# Patient Record
Sex: MALE | Race: BLACK OR AFRICAN AMERICAN | NOT HISPANIC OR LATINO | Employment: FULL TIME | URBAN - METROPOLITAN AREA
[De-identification: names, ages, dates, MRNs, and addresses within clinical notes are randomized per-mention and may not be internally consistent; named-entity substitution may affect disease eponyms.]

---

## 2017-01-05 ENCOUNTER — NURSE TRIAGE (OUTPATIENT)
Dept: ADMINISTRATIVE | Facility: CLINIC | Age: 52
End: 2017-01-05

## 2017-01-06 ENCOUNTER — TELEPHONE (OUTPATIENT)
Dept: FAMILY MEDICINE | Facility: CLINIC | Age: 52
End: 2017-01-06

## 2017-01-06 NOTE — TELEPHONE ENCOUNTER
"  Reason for Disposition   [1] Systolic BP  AND [2] taking blood pressure medications AND [3] NOT dizzy, lightheaded or weak    Answer Assessment - Initial Assessment Questions  1. BLOOD PRESSURE: "What is the blood pressure?" "Did you take at least two measurements 5 minutes apart?"      99/56 HR 64- wife reported his b/p has been low for past 3 days when he gets home and he has been feeling tired.   2. ONSET: "When did you take your blood pressure?"      About 10 min ago  3. HOW: "How did you obtain the blood pressure?" (e.g., visiting nurse, automatic home BP monitor)      Home monitor  4. HISTORY: "Do you have a history of low blood pressure?" "What is your blood pressure normally?"      Is treated for htn,   5. MEDICATIONS: "Are you taking any medications for blood pressure?" If yes: "Have they been changed recently?"      Is taking meds with no reported changes  6. PULSE RATE: "Do you know what your pulse rate is?"       As above  7. OTHER SYMPTOMS: "Have you been sick recently?" "Have you had a recent injury?"      denied  8. PREGNANCY: "Is there any chance you are pregnant?" "When was your last menstrual period?"      N/a  Pt has had 1 cup coffee today and 1 bottle of water, has just voided when he got home. Stated urine was dark.    Protocols used: ST LOW BLOOD PRESSURE-A-    Advised wife to have him elevate legs, drink at least 2 glasses of water in next her and recheck, if systolic still less than 100 and he is feeling weak/dizzy ER - f/u with  tomorrow. Advised on need to increase fluid intake.  "

## 2017-01-06 NOTE — TELEPHONE ENCOUNTER
Pt is doing very well after rehydration.     Pt did state that he has been dizzy int for the last 2 months. His bp has been dropping down to 90s/65 during these episodes. Advised him to hold lisinopril for now and monitor his BP.

## 2017-01-29 ENCOUNTER — PATIENT MESSAGE (OUTPATIENT)
Dept: FAMILY MEDICINE | Facility: CLINIC | Age: 52
End: 2017-01-29

## 2017-02-14 ENCOUNTER — OFFICE VISIT (OUTPATIENT)
Dept: CARDIOLOGY | Facility: CLINIC | Age: 52
End: 2017-02-14
Payer: MEDICAID

## 2017-02-14 VITALS
WEIGHT: 264 LBS | HEART RATE: 77 BPM | BODY MASS INDEX: 34.99 KG/M2 | HEIGHT: 73 IN | DIASTOLIC BLOOD PRESSURE: 82 MMHG | SYSTOLIC BLOOD PRESSURE: 132 MMHG | OXYGEN SATURATION: 98 %

## 2017-02-14 DIAGNOSIS — I50.41 ACUTE COMBINED SYSTOLIC AND DIASTOLIC HEART FAILURE: Primary | ICD-10-CM

## 2017-02-14 DIAGNOSIS — Z95.810 BIVENTRICULAR IMPLANTABLE CARDIOVERTER-DEFIBRILLATOR IN SITU: ICD-10-CM

## 2017-02-14 DIAGNOSIS — I10 ESSENTIAL HYPERTENSION: ICD-10-CM

## 2017-02-14 PROCEDURE — 93289 INTERROG DEVICE EVAL HEART: CPT | Mod: PBBFAC | Performed by: INTERNAL MEDICINE

## 2017-02-14 PROCEDURE — 99212 OFFICE O/P EST SF 10 MIN: CPT | Mod: S$PBB,25,, | Performed by: INTERNAL MEDICINE

## 2017-02-14 PROCEDURE — 99213 OFFICE O/P EST LOW 20 MIN: CPT | Mod: PBBFAC | Performed by: INTERNAL MEDICINE

## 2017-02-14 PROCEDURE — 99999 PR PBB SHADOW E&M-EST. PATIENT-LVL III: CPT | Mod: PBBFAC,,, | Performed by: INTERNAL MEDICINE

## 2017-02-14 PROCEDURE — 93289 INTERROG DEVICE EVAL HEART: CPT | Mod: 26,S$PBB,, | Performed by: INTERNAL MEDICINE

## 2017-02-14 RX ORDER — LISINOPRIL 5 MG/1
5 TABLET ORAL DAILY
Qty: 30 TABLET | Refills: 2 | Status: CANCELLED | OUTPATIENT
Start: 2017-02-14

## 2017-02-14 NOTE — MR AVS SNAPSHOT
[facility]  [address] [facility] [place] [address]  Phone: [phone]                  Tim Richards   2017 1:40 PM   Office Visit    Description:  Male : 1965   Provider:  Nader Chiu MD   Department:  [facility]           Reason for Visit     Follow-up     Pacemaker Check                To Do List           Future Appointments        Provider Department Dept Phone    2017 11:00 AM Ja Méndez MD [facility] [phone]      Goals (5 Years of Data)     None      [facility] On Call     [facility] On Call Nurse Care Line -  Assistance  Registered nurses in the [facility] On Call Center provide clinical advisement, health education, appointment booking, and other advisory services.  Call for this free service at [phone].             Medications           Message regarding Medications     Verify the changes and/or additions to your medication regime listed below are the same as discussed with your clinician today.  If any of these changes or additions are incorrect, please notify your healthcare provider.        STOP taking these medications     nicotine (NICODERM CQ) 21 mg/24 hr Place 1 patch onto the skin once daily. NAME BRAND ONLY           Verify that the below list of medications is an accurate representation of the medications you are currently taking.  If none reported, the list may be blank. If incorrect, please contact your healthcare provider. Carry this list with you in case of emergency.           Current Medications     allopurinol (ZYLOPRIM) 100 MG tablet Take 1 tablet (100 mg total) by mouth 3 (three) times daily.    amiodarone (PACERONE) 200 MG Tab TAKE 1 TABLET (200 MG TOTAL) BY MOUTH ONCE DAILY.    aspirin (ECOTRIN) 81 MG EC tablet Take 81 mg by mouth. 1 Tablet, Delayed Release (E.C.) Oral Every day    carvedilol (COREG) 6.25 MG tablet TAKE 1 TABLET (6.25 MG TOTAL) BY MOUTH 2 (TWO) TIMES DAILY.     "ERGOCALCIFEROL, VITAMIN D2, (VITAMIN D ORAL) Take by mouth once daily.    furosemide (LASIX) 40 MG tablet TAKE 1 TABLET BY MOUTH TWICE A DAY AS NEEDED INCREASE TO 2 TABLETS TWICE A DAY FOR ACUTE WEIGHT GAIN    hydrALAZINE (APRESOLINE) 25 MG tablet Take 1 tablet (25 mg total) by mouth 3 (three) times daily.    lisinopril (PRINIVIL,ZESTRIL) 5 MG tablet Take 1 tablet (5 mg total) by mouth once daily.    metOLazone (ZAROXOLYN) 2.5 MG tablet TAKE 1 TABLET BY MOUTH EVERY OTHER DAY    multivitamin (ONE DAILY MULTIVITAMIN) per tablet Take 1 tablet by mouth once daily.    potassium chloride SA (K-DUR,KLOR-CON) 20 MEQ tablet Take 1 tablet (20 mEq total) by mouth once daily.    VITAMIN E ACETATE (VITAMIN E ORAL) Take by mouth once daily.           Clinical Reference Information           Your Vitals Were     BP Pulse Height Weight SpO2 BMI    132/82 (BP Location: Left arm, Patient Position: Sitting, BP Method: Automatic) 77 6' 1" (1.854 m) 119.7 kg (264 lb) 98% 34.83 kg/m2      Blood Pressure          Most Recent Value    BP  132/82      Allergies as of 2/14/2017     No Known Allergies      Immunizations Administered on Date of Encounter - 2/14/2017     None      Smoking Cessation     If you would like to quit smoking:   You may be eligible for free services if you are a [place] resident and started smoking cigarettes before September 1, 1988.  Call the Smoking Cessation Trust ([facility]) toll free at ([phone] 027-8835 or ([phone] [phone].   Call 1-800-QUIT-NOW if you do not meet the above criteria.            Language Assistance Services     ATTENTION: Language assistance services are available, free of charge. Please call [phone].      ATENCIÓN: Si habla español, tiene a flowers disposición servicios gratuitos de asistencia lingüística. Llame al [phone].     CHÚ Ý: N?u b?n nói Ti?ng Vi?t, có các d?ch v? h? tr? ngôn ng? mi?n phí dành cho b?n. G?i s? [phone].         [facility] complies with applicable " Federal civil rights laws and does not discriminate on the basis of race, color, national origin, age, disability, or sex.

## 2017-02-16 ENCOUNTER — OFFICE VISIT (OUTPATIENT)
Dept: FAMILY MEDICINE | Facility: CLINIC | Age: 52
End: 2017-02-16
Payer: MEDICAID

## 2017-02-16 ENCOUNTER — LAB VISIT (OUTPATIENT)
Dept: LAB | Facility: HOSPITAL | Age: 52
End: 2017-02-16
Attending: FAMILY MEDICINE
Payer: MEDICAID

## 2017-02-16 VITALS
WEIGHT: 268.94 LBS | DIASTOLIC BLOOD PRESSURE: 70 MMHG | SYSTOLIC BLOOD PRESSURE: 104 MMHG | HEIGHT: 73 IN | BODY MASS INDEX: 35.64 KG/M2 | TEMPERATURE: 99 F | RESPIRATION RATE: 16 BRPM | HEART RATE: 89 BPM | OXYGEN SATURATION: 98 %

## 2017-02-16 DIAGNOSIS — I10 ESSENTIAL HYPERTENSION: ICD-10-CM

## 2017-02-16 DIAGNOSIS — E87.6 HYPOKALEMIA: ICD-10-CM

## 2017-02-16 DIAGNOSIS — R42 EPISODIC LIGHTHEADEDNESS: ICD-10-CM

## 2017-02-16 DIAGNOSIS — E87.6 HYPOKALEMIA: Primary | ICD-10-CM

## 2017-02-16 LAB — POTASSIUM SERPL-SCNC: 2.9 MMOL/L

## 2017-02-16 PROCEDURE — 99999 PR PBB SHADOW E&M-EST. PATIENT-LVL III: CPT | Mod: PBBFAC,,, | Performed by: FAMILY MEDICINE

## 2017-02-16 PROCEDURE — 36415 COLL VENOUS BLD VENIPUNCTURE: CPT | Mod: PO

## 2017-02-16 PROCEDURE — 99214 OFFICE O/P EST MOD 30 MIN: CPT | Mod: S$PBB,,, | Performed by: FAMILY MEDICINE

## 2017-02-16 PROCEDURE — 84132 ASSAY OF SERUM POTASSIUM: CPT

## 2017-02-16 RX ORDER — LISINOPRIL 5 MG/1
5 TABLET ORAL DAILY
Qty: 30 TABLET | Refills: 5 | Status: CANCELLED | OUTPATIENT
Start: 2017-02-16

## 2017-02-16 RX ORDER — LISINOPRIL 5 MG/1
5 TABLET ORAL DAILY
Qty: 30 TABLET | Refills: 2 | Status: CANCELLED | OUTPATIENT
Start: 2017-02-16

## 2017-02-16 RX ORDER — CARVEDILOL 3.12 MG/1
3.12 TABLET ORAL 2 TIMES DAILY WITH MEALS
Qty: 60 TABLET | Refills: 11 | Status: ON HOLD | OUTPATIENT
Start: 2017-02-16 | End: 2018-01-17 | Stop reason: HOSPADM

## 2017-02-16 RX ORDER — LISINOPRIL 5 MG/1
5 TABLET ORAL DAILY
Qty: 30 TABLET | Refills: 2 | Status: SHIPPED | OUTPATIENT
Start: 2017-02-16 | End: 2017-03-10

## 2017-02-16 NOTE — MR AVS SNAPSHOT
[facility]  [address] [place] [address]  Phone: [phone]  Fax: [phone]                  Tim Richards   2017 11:00 AM   Office Visit    Description:  Male : 1965   Provider:  Ja Méndez MD   Department:  [facility]           Reason for Visit     Hypokalemia           Diagnoses this Visit        Comments    Hypokalemia    -  Primary     Essential hypertension                To Do List           Goals (5 Years of Data)     None      Follow-Up and Disposition     Return in about 4 months (around 2017).       These Medications        Disp Refills Start End    carvedilol (COREG) 3.125 MG tablet 60 tablet 11 2017    Take 1 tablet (3.125 mg total) by mouth 2 (two) times daily with meals. - Oral    Pharmacy: [facility]/pharmacy #[id number] [facility] [place] [address] Ph #: [phone]       lisinopril (PRINIVIL,ZESTRIL) 5 MG tablet 30 tablet 2 2017     Take 1 tablet (5 mg total) by mouth once daily. - Oral    Pharmacy: [facility]/pharmacy #[id number] - [place], [place] - [address] Ph #: [phone]         [facility] On Call     [facility] On Call Nurse Care Line -  Assistance  Registered nurses in the [facility] On Call Center provide clinical advisement, health education, appointment booking, and other advisory services.  Call for this free service at [phone].             Medications           Message regarding Medications     Verify the changes and/or additions to your medication regime listed below are the same as discussed with your clinician today.  If any of these changes or additions are incorrect, please notify your healthcare provider.        START taking these NEW medications        Refills    carvedilol (COREG) 3.125 MG tablet 11    Sig: Take 1 tablet (3.125 mg total) by mouth 2 (two) times daily with meals.    Class: Normal    Route: Oral           Verify that the below list of medications is an  "accurate representation of the medications you are currently taking.  If none reported, the list may be blank. If incorrect, please contact your healthcare provider. Carry this list with you in case of emergency.           Current Medications     allopurinol (ZYLOPRIM) 100 MG tablet Take 1 tablet (100 mg total) by mouth 3 (three) times daily.    amiodarone (PACERONE) 200 MG Tab TAKE 1 TABLET (200 MG TOTAL) BY MOUTH ONCE DAILY.    aspirin (ECOTRIN) 81 MG EC tablet Take 81 mg by mouth. 1 Tablet, Delayed Release (E.C.) Oral Every day    ERGOCALCIFEROL, VITAMIN D2, (VITAMIN D ORAL) Take by mouth once daily.    furosemide (LASIX) 40 MG tablet TAKE 1 TABLET BY MOUTH TWICE A DAY AS NEEDED INCREASE TO 2 TABLETS TWICE A DAY FOR ACUTE WEIGHT GAIN    hydrALAZINE (APRESOLINE) 25 MG tablet Take 1 tablet (25 mg total) by mouth 3 (three) times daily.    lisinopril (PRINIVIL,ZESTRIL) 5 MG tablet Take 1 tablet (5 mg total) by mouth once daily.    metOLazone (ZAROXOLYN) 2.5 MG tablet TAKE 1 TABLET BY MOUTH EVERY OTHER DAY    multivitamin (ONE DAILY MULTIVITAMIN) per tablet Take 1 tablet by mouth once daily.    VITAMIN E ACETATE (VITAMIN E ORAL) Take by mouth once daily.    carvedilol (COREG) 3.125 MG tablet Take 1 tablet (3.125 mg total) by mouth 2 (two) times daily with meals.    potassium chloride SA (K-DUR,KLOR-CON) 20 MEQ tablet Take 1 tablet (20 mEq total) by mouth once daily.           Clinical Reference Information           Your Vitals Were     BP Pulse Temp Resp Height Weight    104/70 (BP Location: Left arm, Patient Position: Sitting, BP Method: Manual) 89 98.8 °F (37.1 °C) (Oral) 16 6' 1" (1.854 m) 122 kg (268 lb 15.4 oz)    SpO2 BMI             98% 35.49 kg/m2         Blood Pressure          Most Recent Value    BP  104/70      Allergies as of 2/16/2017     No Known Allergies      Immunizations Administered on Date of Encounter - 2/16/2017     None      Orders Placed During Today's Visit     Future Labs/Procedures " Expected by Expires    Potassium  2/16/2017 4/17/2018      Smoking Cessation     If you would like to quit smoking:   You may be eligible for free services if you are a [place] resident and started smoking cigarettes before September 1, 1988.  Call the Smoking Cessation Trust ([facility]) toll free at (930) 411-6604 or (952) 787-8776.   Call 8-304-QUIT-NOW if you do not meet the above criteria.            Language Assistance Services     ATTENTION: Language assistance services are available, free of charge. Please call [phone].      ATENCIÓN: Si habla español, tiene a flowers disposición servicios gratuitos de asistencia lingüística. Llame al [phone].     CHÚ Ý: N?u b?n nói Ti?ng Vi?t, có các d?ch v? h? tr? ngôn ng? mi?n phí dành cho b?n. G?i s? [phone].         [facility] complies with applicable Federal civil rights laws and does not discriminate on the basis of race, color, national origin, age, disability, or sex.

## 2017-02-16 NOTE — PROGRESS NOTES
Subjective:       Patient ID: Tim Richards is a 51 y.o. male.    Chief Complaint: Hypokalemia (follow up repeat lab)    HPI    Hypokalemia - Pt is here today for f/u hypokalemia. He has had issues with this in the past due to his lasix. No complaints of CP or palpitations.       Hypotension - pt did have some episodes of lower blood pressure a/w dizziness since he was started on lisinopril. He noted that this occurred more often when he takes the lisinopril and carvedilol together.     Overall, pt feels better and stronger than he has in a while.     Current Outpatient Prescriptions on File Prior to Visit   Medication Sig Dispense Refill    allopurinol (ZYLOPRIM) 100 MG tablet Take 1 tablet (100 mg total) by mouth 3 (three) times daily. 90 tablet 1    amiodarone (PACERONE) 200 MG Tab TAKE 1 TABLET (200 MG TOTAL) BY MOUTH ONCE DAILY. 30 tablet 7    aspirin (ECOTRIN) 81 MG EC tablet Take 81 mg by mouth. 1 Tablet, Delayed Release (E.C.) Oral Every day      ERGOCALCIFEROL, VITAMIN D2, (VITAMIN D ORAL) Take by mouth once daily.      furosemide (LASIX) 40 MG tablet TAKE 1 TABLET BY MOUTH TWICE A DAY AS NEEDED INCREASE TO 2 TABLETS TWICE A DAY FOR ACUTE WEIGHT GAIN 90 tablet 0    hydrALAZINE (APRESOLINE) 25 MG tablet Take 1 tablet (25 mg total) by mouth 3 (three) times daily. 90 tablet 11    metOLazone (ZAROXOLYN) 2.5 MG tablet TAKE 1 TABLET BY MOUTH EVERY OTHER DAY 15 tablet 3    multivitamin (ONE DAILY MULTIVITAMIN) per tablet Take 1 tablet by mouth once daily.      VITAMIN E ACETATE (VITAMIN E ORAL) Take by mouth once daily.      [DISCONTINUED] carvedilol (COREG) 6.25 MG tablet TAKE 1 TABLET (6.25 MG TOTAL) BY MOUTH 2 (TWO) TIMES DAILY. 180 tablet 1    [DISCONTINUED] lisinopril (PRINIVIL,ZESTRIL) 5 MG tablet Take 1 tablet (5 mg total) by mouth once daily. 30 tablet 2    potassium chloride SA (K-DUR,KLOR-CON) 20 MEQ tablet Take 1 tablet (20 mEq total) by mouth once daily. 90 tablet 1     No current  facility-administered medications on file prior to visit.        Review of Systems   Cardiovascular: Negative for chest pain and palpitations.   Neurological: Positive for light-headedness. Negative for seizures, syncope and weakness.       Objective:     Vitals:    02/16/17 1118   BP: 104/70   Pulse: 89   Resp: 16   Temp: 98.8 °F (37.1 °C)        Physical Exam   Constitutional: He appears well-developed. No distress.   HENT:   Head: Normocephalic and atraumatic.   Eyes: Conjunctivae and EOM are normal.   Cardiovascular: Normal rate and regular rhythm.  Exam reveals no gallop and no friction rub.    No murmur heard.  Heart sounds a bit distant, but louder than previous exam.    Pulmonary/Chest: Effort normal and breath sounds normal. No respiratory distress. He has no wheezes. He has no rales. He exhibits no tenderness.   Musculoskeletal: He exhibits no edema.   No gross extremity deformity   Neurological: He is alert.   Psychiatric: He has a normal mood and affect. His behavior is normal.   Nursing note and vitals reviewed.      Assessment:       1. Hypokalemia    2. Essential hypertension    3. Episodic lightheadedness        Plan:       Tim was seen today for hypokalemia.    Diagnoses and all orders for this visit:    Hypokalemia  -     Potassium; Future  Recurrent. Will check today. ACEi should be helping. Will supplement if still low.     Essential hypertension  -     carvedilol (COREG) 3.125 MG tablet; Take 1 tablet (3.125 mg total) by mouth 2 (two) times daily with meals.  -     lisinopril (PRINIVIL,ZESTRIL) 5 MG tablet; Take 1 tablet (5 mg total) by mouth once daily.  Will cut carvedilol dose down to 3.125 BID and move lisinopril to lunch time to hopefully avoid hypotension and lightheadedness. Pt states that overall he feel sbetter than he has in a long time, and attributes this to lisinopril.      Lightheadeness - New - as above - likely form hypotension        Return in about 4 months (around  6/16/2017).      Pt verbalized understanding and agreed with our plan.

## 2017-02-17 ENCOUNTER — TELEPHONE (OUTPATIENT)
Dept: FAMILY MEDICINE | Facility: CLINIC | Age: 52
End: 2017-02-17

## 2017-02-17 DIAGNOSIS — E87.5 HYPERKALEMIA: ICD-10-CM

## 2017-02-17 RX ORDER — POTASSIUM CHLORIDE 20 MEQ/1
20 TABLET, EXTENDED RELEASE ORAL 2 TIMES DAILY
Qty: 90 TABLET | Refills: 1 | Status: SHIPPED | OUTPATIENT
Start: 2017-02-17 | End: 2017-06-23 | Stop reason: SDUPTHER

## 2017-02-17 NOTE — TELEPHONE ENCOUNTER
----- Message from Ja Méndez MD sent at 2/17/2017  8:39 AM CST -----  Please notify patient results are abnormal. His Potassium fell to 2.9 from 3.6. I am starting (or restarting) him on a potassium supplement which has been sent to his pharm.       Thanks.

## 2017-02-20 NOTE — PROGRESS NOTES
BIVE ICD ([employer])      DDDR 60       apced <1%, V paced 100%      A: 2.9 V, 532 ohms, 1 V @ 0.4 ms  RV: 6.9 V, 475 ohms, 0.875 V @ 0.4 ms  LV: 551 ohms, 0.5 V @ 0.6 ms      No episodes  No changes      RTC 6 mos

## 2017-03-10 ENCOUNTER — HOSPITAL ENCOUNTER (EMERGENCY)
Facility: HOSPITAL | Age: 52
Discharge: HOME | End: 2017-03-10
Attending: EMERGENCY MEDICINE
Payer: MEDICAID

## 2017-03-10 VITALS
SYSTOLIC BLOOD PRESSURE: 95 MMHG | HEART RATE: 78 BPM | RESPIRATION RATE: 16 BRPM | HEIGHT: 73 IN | DIASTOLIC BLOOD PRESSURE: 62 MMHG | WEIGHT: 266 LBS | OXYGEN SATURATION: 99 % | BODY MASS INDEX: 35.25 KG/M2 | TEMPERATURE: 98 F

## 2017-03-10 DIAGNOSIS — T78.3XXA ANGIOEDEMA OF LIPS, INITIAL ENCOUNTER: Primary | ICD-10-CM

## 2017-03-10 PROCEDURE — 96374 THER/PROPH/DIAG INJ IV PUSH: CPT

## 2017-03-10 PROCEDURE — 25000003 PHARM REV CODE 250: Performed by: EMERGENCY MEDICINE

## 2017-03-10 PROCEDURE — 63600175 PHARM REV CODE 636 W HCPCS: Performed by: EMERGENCY MEDICINE

## 2017-03-10 PROCEDURE — 96372 THER/PROPH/DIAG INJ SC/IM: CPT

## 2017-03-10 PROCEDURE — 99284 EMERGENCY DEPT VISIT MOD MDM: CPT | Mod: 25

## 2017-03-10 PROCEDURE — 96375 TX/PRO/DX INJ NEW DRUG ADDON: CPT

## 2017-03-10 PROCEDURE — 96361 HYDRATE IV INFUSION ADD-ON: CPT

## 2017-03-10 PROCEDURE — 99283 EMERGENCY DEPT VISIT LOW MDM: CPT | Mod: 25

## 2017-03-10 RX ORDER — EPINEPHRINE 1 MG/ML
0.5 INJECTION, SOLUTION INTRACARDIAC; INTRAMUSCULAR; INTRAVENOUS; SUBCUTANEOUS
Status: COMPLETED | OUTPATIENT
Start: 2017-03-10 | End: 2017-03-10

## 2017-03-10 RX ORDER — FAMOTIDINE 10 MG/ML
20 INJECTION INTRAVENOUS
Status: COMPLETED | OUTPATIENT
Start: 2017-03-10 | End: 2017-03-10

## 2017-03-10 RX ORDER — DIPHENHYDRAMINE HYDROCHLORIDE 50 MG/ML
50 INJECTION INTRAMUSCULAR; INTRAVENOUS
Status: COMPLETED | OUTPATIENT
Start: 2017-03-10 | End: 2017-03-10

## 2017-03-10 RX ORDER — SODIUM CHLORIDE 9 MG/ML
1000 INJECTION, SOLUTION INTRAVENOUS
Status: COMPLETED | OUTPATIENT
Start: 2017-03-10 | End: 2017-03-10

## 2017-03-10 RX ORDER — DEXAMETHASONE SODIUM PHOSPHATE 4 MG/ML
8 INJECTION, SOLUTION INTRA-ARTICULAR; INTRALESIONAL; INTRAMUSCULAR; INTRAVENOUS; SOFT TISSUE
Status: COMPLETED | OUTPATIENT
Start: 2017-03-10 | End: 2017-03-10

## 2017-03-10 RX ADMIN — DIPHENHYDRAMINE HYDROCHLORIDE 50 MG: 50 INJECTION, SOLUTION INTRAMUSCULAR; INTRAVENOUS at 03:03

## 2017-03-10 RX ADMIN — EPINEPHRINE 0.5 MG: 1 INJECTION, SOLUTION INTRAMUSCULAR; SUBCUTANEOUS at 02:03

## 2017-03-10 RX ADMIN — DEXAMETHASONE SODIUM PHOSPHATE 8 MG: 4 INJECTION, SOLUTION INTRAMUSCULAR; INTRAVENOUS at 03:03

## 2017-03-10 RX ADMIN — SODIUM CHLORIDE 1000 ML: 0.9 INJECTION, SOLUTION INTRAVENOUS at 03:03

## 2017-03-10 RX ADMIN — FAMOTIDINE 20 MG: 10 INJECTION, SOLUTION INTRAVENOUS at 03:03

## 2017-03-10 NOTE — ED TRIAGE NOTES
"Pt arrived via personal transportation. States went to a restaurant last night and noticed swelling around mouth this morning. Right before he arrived to ED throat started to irritate him. "I feel like my tonsils are swollen".   "

## 2017-03-10 NOTE — ED PROVIDER NOTES
"Encounter Date: 3/10/2017    SCRIBE #1 NOTE: I, Kwame Pearson, am scribing for, and in the presence of, Chester Barton MD. Other sections scribed: HPI, ROS.       History     Chief Complaint   Patient presents with    Oral Swelling     " I woke up and noticed my lips were swelling." Pt reports he feels like his throat is starting to swell, and tounge feels funny. Pt is taking lisinopril.      Review of patient's allergies indicates:  No Known Allergies  HPI Comments: CC: Oral Swelling  HPI: This 51 y.o. male with HTN, CHF, renal insufficiency, AICD in place and Hx of tobacco use presents to the ED c/o swelling to lips he first noticed after he woke up this morning. Pt states swelling was initially confined to R lower lip, but it has since spread to R upper lip and then L lower lip; he states that he decided to come in after his throat started feeling scratchy this afternoon. Pt states that he was treated for a similar problem a few years ago, but he was not on any ACE inhibitors at the time. He denies SOB, chest pain, wheezing, abdominal pain, N/V.      The history is provided by the patient.     Past Medical History:   Diagnosis Date    CHF (congestive heart failure)     Elevated LFTs     HTN (hypertension)      Past Surgical History:   Procedure Laterality Date    CARDIAC CATHETERIZATION  Dec. 2012    CARDIAC DEFIBRILLATOR PLACEMENT Left      Family History   Problem Relation Age of Onset    Hypertension Father     Diabetes Father     Coronary artery disease Father     No Known Problems Mother      Social History   Substance Use Topics    Smoking status: Current Some Day Smoker     Packs/day: 1.00     Years: 31.00     Types: Cigarettes    Smokeless tobacco: Never Used      Comment: pt is quiting on his own - pt stated not qualified for program; 2/16 pt  quit on his own    Alcohol use 0.0 oz/week     0 Standard drinks or equivalent per week      Comment: 2 glasses wine daily     Review of Systems "   Constitutional: Negative for chills and fever.   HENT: Positive for facial swelling. Negative for congestion, rhinorrhea, trouble swallowing and voice change.    Eyes: Negative for pain and visual disturbance.   Respiratory: Negative for cough and shortness of breath.    Cardiovascular: Negative for chest pain.   Gastrointestinal: Negative for abdominal pain, nausea and vomiting.   Genitourinary: Negative for dysuria and frequency.   Musculoskeletal: Negative for arthralgias and myalgias.   Skin: Negative for rash and wound.   Neurological: Negative for headaches.       Physical Exam   Initial Vitals   BP Pulse Resp Temp SpO2   03/10/17 1430 03/10/17 1430 03/10/17 1430 03/10/17 1430 03/10/17 1430   116/70 90 20 99.9 °F (37.7 °C) 100 %     Physical Exam    Nursing note and vitals reviewed.  HENT:   Head: Atraumatic.   Moderate bilateral lower lip and right upper lip swelling.  Swelling of R buccal mucosa.  Posterior pharynx is visible without obvious swelling   Eyes: Conjunctivae and EOM are normal.   Neck: Normal range of motion.   Cardiovascular: Exam reveals no gallop and no friction rub.    No murmur heard.  Pulmonary/Chest: Breath sounds normal. No stridor. No respiratory distress. He has no wheezes. He has no rales.   Abdominal: Soft. Bowel sounds are normal. He exhibits no distension. There is no tenderness.   Musculoskeletal: Normal range of motion. He exhibits no edema.   Neurological: He is alert and oriented to person, place, and time.   Skin: Skin is warm and dry.   Psychiatric: He has a normal mood and affect.         ED Course   Critical Care  Date/Time: 3/10/2017 4:28 PM  Performed by: YUNI LIRA  Authorized by: YUNI LIRA   Direct patient critical care time: 28 minutes  Ordering / reviewing critical care time: 10 minutes  Documentation critical care time: 10 minutes  Total critical care time (exclusive of procedural time) : 48 minutes  Critical care was necessary to treat or prevent  imminent or life-threatening deterioration of the following conditions: circulatory failure, cardiac failure, respiratory failure and shock.  Critical care was time spent personally by me on the following activities: development of treatment plan with patient or surrogate, evaluation of patient's response to treatment, examination of patient, ordering and performing treatments and interventions, obtaining history from patient or surrogate, pulse oximetry, re-evaluation of patient's condition and review of old charts.        Labs Reviewed - No data to display          Medical Decision Making:   Initial Assessment:   51-year-old male with a history of CHF and hypertension presents with lip and throat swelling that started he woke up this morning.  Patient is on lisinopril.  Vital signs are unremarkable.  On exam he has lower lip and right upper lip swelling.  He has slightly slurred speech.  No stridor.  Posterior pharynx is easily visible.  No wheezing.  Patient emergently given Benadryl, steroids, Pepcid and IM epinephrine.  Frequently reevaluate.    4:28 PM  Lower lip swelling improving.  Voice more clear.  Will continue to monitor.    5:47 PM  Lip swelling continuing to improve.    6:48 PM  Swelling continues to improve.  No evidence of airway compromise arrest or decolorization.  I think he is safe and stable for discharge.  He is tolerating water.  Instructed patient to discontinue lisinopril use and follow-up with primary care.  Return if any symptoms recur or worsen at any time.  Patient verbalized understanding and agreement with the plan.            Scribe Attestation:   Scribe #1: I performed the above scribed service and the documentation accurately describes the services I performed. I attest to the accuracy of the note.    Attending Attestation:           Physician Attestation for Scribe:  Physician Attestation Statement for Scribe #1: I, Chester Barton MD, reviewed documentation, as scribed by Kwame  Vocke in my presence, and it is both accurate and complete.                 ED Course     Clinical Impression:   The encounter diagnosis was Angioedema of lips, initial encounter.          Chester Barton MD  03/10/17 3700

## 2017-03-10 NOTE — ED AVS SNAPSHOT
[facility]  [address] [place] [address]               Tim Richards   3/10/2017  2:31 PM   ED    Description:  Male : 1965   Department:  [facility]           Your Care was Coordinated By:     Provider Role From To    Chester Barton MD Attending Provider 03/10/17 1435 --      Reason for Visit     Oral Swelling           Diagnoses this Visit        Comments    Angioedema of lips, initial encounter    -  Primary       ED Disposition     ED Disposition Condition Comment    Discharge             To Do List           Follow-up Information     Schedule an appointment as soon as possible for a visit with Ja Méndez MD.    Specialty:  Family Medicine    Contact information:    [address]  [facility] [address]  [phone]        [facility] On [facility]     [facility] Care [facility] -  Assistance  Registered nurses in the [facility] Center provide clinical advisement, health education, appointment booking, and other advisory services.  Call for this free service at [phone].             Medications           Message regarding Medications     Verify the changes and/or additions to your medication regime listed below are the same as discussed with your clinician today.  If any of these changes or additions are incorrect, please notify your healthcare provider.        These medications were administered today        Dose Freq    dexamethasone injection 8 mg 8 mg ED 1 Time    Sig: Inject 2 mLs (8 mg total) into the vein ED 1 Time.    Class: Normal    Route: Intravenous    diphenhydrAMINE injection 50 mg 50 mg ED 1 Time    Sig: Inject 1 mL (50 mg total) into the vein ED 1 Time.    Class: Normal    Route: Intravenous    EPINEPHrine 1 mg/mL (1 mL) injection 0.5 mg 0.5 mg ED 1 Time    Sig: Inject 0.5 mLs (0.5 mg total) into the muscle ED 1 Time.    Class: Normal    Route: Intramuscular     famotidine (PF) 20 mg/2 mL injection 20 mg 20 mg ED 1 Time    Sig: Inject 2 mLs (20 mg total) into the vein ED 1 Time.    Class: Normal    Route: Intravenous    0.9%  NaCl infusion 1,000 mL ED 1 Time    Sig: Inject 1,000 mLs into the vein ED 1 Time.    Class: Normal    Route: Intravenous      STOP taking these medications     hydrALAZINE (APRESOLINE) 25 MG tablet Take 1 tablet (25 mg total) by mouth 3 (three) times daily.    multivitamin (ONE DAILY MULTIVITAMIN) per tablet Take 1 tablet by mouth once daily.    lisinopril (PRINIVIL,ZESTRIL) 5 MG tablet Take 1 tablet (5 mg total) by mouth once daily.           Verify that the below list of medications is an accurate representation of the medications you are currently taking.  If none reported, the list may be blank. If incorrect, please contact your healthcare provider. Carry this list with you in case of emergency.           Current Medications     allopurinol (ZYLOPRIM) 100 MG tablet Take 1 tablet (100 mg total) by mouth 3 (three) times daily.    amiodarone (PACERONE) 200 MG Tab TAKE 1 TABLET (200 MG TOTAL) BY MOUTH ONCE DAILY.    aspirin (ECOTRIN) 81 MG EC tablet Take 81 mg by mouth. 1 Tablet, Delayed Release (E.C.) Oral Every day    carvedilol (COREG) 3.125 MG tablet Take 1 tablet (3.125 mg total) by mouth 2 (two) times daily with meals.    cyanocobalamin, vitamin B-12, (VITAMIN B-12) 50 mcg tablet Take 50 mcg by mouth once daily.    ERGOCALCIFEROL, VITAMIN D2, (VITAMIN D ORAL) Take by mouth once daily.    furosemide (LASIX) 40 MG tablet TAKE 1 TABLET BY MOUTH TWICE A DAY AS NEEDED INCREASE TO 2 TABLETS TWICE A DAY FOR ACUTE WEIGHT GAIN    metOLazone (ZAROXOLYN) 2.5 MG tablet TAKE 1 TABLET BY MOUTH EVERY OTHER DAY    potassium chloride SA (K-DUR,KLOR-CON) 20 MEQ tablet Take 1 tablet (20 mEq total) by mouth 2 (two) times daily.    VITAMIN E ACETATE (VITAMIN E ORAL) Take by mouth once daily.           Clinical Reference Information           Your Vitals Were     BP  "Pulse Temp Resp Height Weight    112/66 78 99.9 °F (37.7 °C) (Oral) 20 6' 1" (1.854 m) 120.7 kg (266 lb)    SpO2 BMI             99% 35.09 kg/m2         Allergies as of 3/10/2017     No Known Allergies      Immunizations Administered on Date of Encounter - 3/10/2017     None      ED Micro, Lab, POCT     None      ED Imaging Orders     None        Discharge Instructions         Angioedema  Angioedema (BX-hag-pc-eh-INA-muh) is a sudden appearance of swollen patches (edema) on the skin or mucous membranes. It most often involves the face, lips, mouth, tongue, back of throat, or vocal cords. It may also occur in other places, such as the arms or legs. A rash may also appear during the first 4 days of this illness.  Symptoms  There are different types of angioedema. Your symptoms will depend on what type of angioedema you have. Swelling and redness may be the main symptoms. Like allergic reactions, angioedema may include:  · Rash, hives, redness, welts, blisters  · Itching, burning, stinging, pain  · Dry, flaky, cracking, or scaly skin  · Swelling of the face, lips, or other parts of the body  More severe symptoms may include:  · Trouble swallowing, or feeling like your throat is closing  · Trouble breathing or wheezing  · Hoarse voice or trouble speaking  · Nausea, vomiting, diarrhea, or stomach cramps  · Feeling faint or lightheaded, rapid heart rate, or low blood pressure  Causes  Angioedema can be triggered by exposure to certain substances. Medical conditions involving the immune systems and certain infections may cause it. In rare cases, angioedema can be hereditary. Sometimes the cause may be very clear. However, it is often hard to find a cause. The most common causes include:  · Foods, such as shrimp, shellfish, peanuts, milk products, gluten, and eggs; also colorings, flavorings, and additives  · Insect bites or stings, from bees, mosquitos, fleas, or ticks  · Medicines, such as ACE inhibitors, penicillin, " sulfa drugs, amoxicillin, aspirin, and ibuprofen  · Latex, which may be in gloves, clothes, toys, balloons, and some kinds of tape. People who are allergic to latex may have problems with foods such as bananas, avocados, kiwi, papaya, or chestnuts.  · Stress  · Heat, cold, or sunlight  The most common cause of angioedema is a reaction to a class of medicines called ACE inhibitors. These are used to treat high blood pressure. ACE inhibitors include captopril, enalapril, and lisinopril. Tell your doctor if you have angioedema symptoms and are taking any of these medicines.  Angioedema may recur. It is important to watch for the earliest signs of this condition (see the list below). Contact your healthcare provider right away if swelling involves the face, mouth, or throat.  Home care  Rest quietly today. Avoid vigorous physical activity.  Medicines: The healthcare provider may prescribe medicines for itching, swelling, or pain. Follow the healthcare providers instructions when taking these medicines.  · Oral diphenhydramine is an antihistamine available without a prescription. Unless a prescription antihistamine was given, diphenhydramine may be used to reduce widespread itching. It may make you sleepy, so be careful using it when going to school, working, or driving. (Note: Do not use diphenhydramine if you have glaucoma or if you are a man who has trouble urinating due to an enlarged prostate.) Loratadine is an antihistamine that may cause less drowsiness.  · Do not use diphenhydramine cream on your skin. Some people can have an allergic reaction to this.  · Calamine lotion or oatmeal baths sometimes help with itching.  · You may use acetaminophen or ibuprofen for pain, unless another pain medicine was prescribed.  · If you were told that your angioedema was caused by a medicine you are taking, you must stop taking it. Ask your healthcare provider for a different one. In the future, advise medical staff that you  are allergic to this medicine.  · If medicine was prescribed to treat angioedema (such as steroids or antihistamines), be sure you understand what the medicine is and how to take it. Then, take it as directed.  Follow-up care  Follow up with your healthcare provider, or as advised.  Call 911  Contact emergency services right away if any of these occur:  · Trouble breathing or swallowing, or wheezing  · Hoarse voice or trouble speaking  · Chest pain  · Confusion  · Extreme drowsiness or trouble awakening  · Fainting or loss of consciousness  · Rapid heart rate  · Vomiting blood, or large amounts of blood in stool  · Seizure  When to seek medical attention  Call your healthcare provider right away if any of the following occur:  · Increase in swelling of the lips, mouth, or tongue  · Severe abdominal pain  Date Last Reviewed: 7/20/2015 © 2000-2016 [employer]. [address] [place] [address]. All rights reserved. This information is not intended as a substitute for professional medical care. Always follow your healthcare professional's instructions.           [facility] complies with applicable Federal civil rights laws and does not discriminate on the basis of race, color, national origin, age, disability, or sex.        Language Assistance Services     ATTENTION: Language assistance services are available, free of charge. Please call [phone].      ATENCIÓN: Si habla tangela, tiene a flowers disposición servicios gratuitos de asistencia lingüística. Llame al [phone].     JANNIE Ý: N?u b?n nói Ti?ng Vi?t, có các d?ch v? h? tr? ngôn ng? mi?n phí dành cho b?n. G?i s? [phone].

## 2017-03-11 NOTE — DISCHARGE INSTRUCTIONS
Angioedema  Angioedema (NN-gub-mu-eh-INA-muh) is a sudden appearance of swollen patches (edema) on the skin or mucous membranes. It most often involves the face, lips, mouth, tongue, back of throat, or vocal cords. It may also occur in other places, such as the arms or legs. A rash may also appear during the first 4 days of this illness.  Symptoms  There are different types of angioedema. Your symptoms will depend on what type of angioedema you have. Swelling and redness may be the main symptoms. Like allergic reactions, angioedema may include:  · Rash, hives, redness, welts, blisters  · Itching, burning, stinging, pain  · Dry, flaky, cracking, or scaly skin  · Swelling of the face, lips, or other parts of the body  More severe symptoms may include:  · Trouble swallowing, or feeling like your throat is closing  · Trouble breathing or wheezing  · Hoarse voice or trouble speaking  · Nausea, vomiting, diarrhea, or stomach cramps  · Feeling faint or lightheaded, rapid heart rate, or low blood pressure  Causes  Angioedema can be triggered by exposure to certain substances. Medical conditions involving the immune systems and certain infections may cause it. In rare cases, angioedema can be hereditary. Sometimes the cause may be very clear. However, it is often hard to find a cause. The most common causes include:  · Foods, such as shrimp, shellfish, peanuts, milk products, gluten, and eggs; also colorings, flavorings, and additives  · Insect bites or stings, from bees, mosquitos, fleas, or ticks  · Medicines, such as ACE inhibitors, penicillin, sulfa drugs, amoxicillin, aspirin, and ibuprofen  · Latex, which may be in gloves, clothes, toys, balloons, and some kinds of tape. People who are allergic to latex may have problems with foods such as bananas, avocados, kiwi, papaya, or chestnuts.  · Stress  · Heat, cold, or sunlight  The most common cause of angioedema is a reaction to a class of medicines called ACE  inhibitors. These are used to treat high blood pressure. ACE inhibitors include captopril, enalapril, and lisinopril. Tell your doctor if you have angioedema symptoms and are taking any of these medicines.  Angioedema may recur. It is important to watch for the earliest signs of this condition (see the list below). Contact your healthcare provider right away if swelling involves the face, mouth, or throat.  Home care  Rest quietly today. Avoid vigorous physical activity.  Medicines: The healthcare provider may prescribe medicines for itching, swelling, or pain. Follow the healthcare providers instructions when taking these medicines.  · Oral diphenhydramine is an antihistamine available without a prescription. Unless a prescription antihistamine was given, diphenhydramine may be used to reduce widespread itching. It may make you sleepy, so be careful using it when going to school, working, or driving. (Note: Do not use diphenhydramine if you have glaucoma or if you are a man who has trouble urinating due to an enlarged prostate.) Loratadine is an antihistamine that may cause less drowsiness.  · Do not use diphenhydramine cream on your skin. Some people can have an allergic reaction to this.  · Calamine lotion or oatmeal baths sometimes help with itching.  · You may use acetaminophen or ibuprofen for pain, unless another pain medicine was prescribed.  · If you were told that your angioedema was caused by a medicine you are taking, you must stop taking it. Ask your healthcare provider for a different one. In the future, advise medical staff that you are allergic to this medicine.  · If medicine was prescribed to treat angioedema (such as steroids or antihistamines), be sure you understand what the medicine is and how to take it. Then, take it as directed.  Follow-up care  Follow up with your healthcare provider, or as advised.  Call 911  Contact emergency services right away if any of these occur:  · Trouble  breathing or swallowing, or wheezing  · Hoarse voice or trouble speaking  · Chest pain  · Confusion  · Extreme drowsiness or trouble awakening  · Fainting or loss of consciousness  · Rapid heart rate  · Vomiting blood, or large amounts of blood in stool  · Seizure  When to seek medical attention  Call your healthcare provider right away if any of the following occur:  · Increase in swelling of the lips, mouth, or tongue  · Severe abdominal pain  Date Last Reviewed: 7/20/2015 © 2000-2016 [employer]. [address] [place] [address]. All rights reserved. This information is not intended as a substitute for professional medical care. Always follow your healthcare professional's instructions.

## 2017-03-13 ENCOUNTER — TELEPHONE (OUTPATIENT)
Dept: FAMILY MEDICINE | Facility: CLINIC | Age: 52
End: 2017-03-13

## 2017-03-13 NOTE — TELEPHONE ENCOUNTER
Patient stated that he had an allergic reaction to the lisinopril. Was seen and treated in ER. Patient stated BP has been very good. Patient BP readings have been around the same(113 66) 78HR. Patient inquiring whether he should follow up or will an alternative medication be prescribed. Please advise

## 2017-03-13 NOTE — TELEPHONE ENCOUNTER
----- Message from Keyona Chapman sent at 3/11/2017 11:32 AM CST -----  Patient was in ER with an allergic reaction to lisinopril. They need to discuss what else he can take. Please call at [phone] or [phone] Thank you!

## 2017-03-15 RX ORDER — FUROSEMIDE 40 MG/1
TABLET ORAL
Qty: 90 TABLET | Refills: 0 | Status: SHIPPED | OUTPATIENT
Start: 2017-03-15 | End: 2017-04-09 | Stop reason: SDUPTHER

## 2017-03-17 ENCOUNTER — OFFICE VISIT (OUTPATIENT)
Dept: FAMILY MEDICINE | Facility: CLINIC | Age: 52
End: 2017-03-17
Payer: MEDICAID

## 2017-03-17 VITALS
DIASTOLIC BLOOD PRESSURE: 80 MMHG | WEIGHT: 264.56 LBS | SYSTOLIC BLOOD PRESSURE: 106 MMHG | TEMPERATURE: 99 F | BODY MASS INDEX: 35.06 KG/M2 | OXYGEN SATURATION: 97 % | RESPIRATION RATE: 16 BRPM | HEART RATE: 53 BPM | HEIGHT: 73 IN

## 2017-03-17 DIAGNOSIS — E79.0 HYPERURICEMIA: ICD-10-CM

## 2017-03-17 DIAGNOSIS — M10.272 ACUTE DRUG-INDUCED GOUT OF LEFT ANKLE: ICD-10-CM

## 2017-03-17 DIAGNOSIS — T78.3XXD ANGIOEDEMA, SUBSEQUENT ENCOUNTER: ICD-10-CM

## 2017-03-17 DIAGNOSIS — I50.42 CHRONIC COMBINED SYSTOLIC AND DIASTOLIC CONGESTIVE HEART FAILURE: Primary | ICD-10-CM

## 2017-03-17 PROCEDURE — 99999 PR PBB SHADOW E&M-EST. PATIENT-LVL III: CPT | Mod: PBBFAC,,, | Performed by: FAMILY MEDICINE

## 2017-03-17 PROCEDURE — 99213 OFFICE O/P EST LOW 20 MIN: CPT | Mod: PBBFAC,PO | Performed by: FAMILY MEDICINE

## 2017-03-17 PROCEDURE — 99215 OFFICE O/P EST HI 40 MIN: CPT | Mod: S$PBB,,, | Performed by: FAMILY MEDICINE

## 2017-03-17 RX ORDER — LOSARTAN POTASSIUM 25 MG/1
25 TABLET ORAL DAILY
Qty: 90 TABLET | Refills: 1 | Status: SHIPPED | OUTPATIENT
Start: 2017-03-17 | End: 2017-10-28 | Stop reason: SDUPTHER

## 2017-03-17 RX ORDER — HYDRALAZINE HYDROCHLORIDE 25 MG/1
25 TABLET, FILM COATED ORAL
COMMUNITY
Start: 2017-03-09 | End: 2017-03-17

## 2017-03-17 RX ORDER — ALLOPURINOL 100 MG/1
TABLET ORAL
Qty: 90 TABLET | Refills: 1 | Status: SHIPPED | OUTPATIENT
Start: 2017-03-17 | End: 2017-05-22 | Stop reason: SDUPTHER

## 2017-03-17 RX ORDER — INDOMETHACIN 50 MG/1
CAPSULE ORAL
Qty: 30 CAPSULE | Refills: 0 | Status: SHIPPED | OUTPATIENT
Start: 2017-03-17 | End: 2017-10-05

## 2017-03-17 NOTE — PROGRESS NOTES
Subjective:       Patient ID: Tim Richards is a 51 y.o. male.    Chief Complaint: Hospital Follow Up (medication reaction to lisinopril - lip swelling)    HPI    Angioedema - pt had a case of angioedema, and reported to the ED one week ago. This episode was blamed on lisinopril which was started for his severe CHF 3 months ago. Since discharge, he has been doing well, despite this side effect from therapy.     He is interested in discussing other medication options.       He is disappointed because he was feeling so much better on lisinopril.     Severe CHF - pt is adherent with the remainder of his medications without side effect. He denies CP, palpitations worsening edema or fatigue.     Hypokalemia - chronic - pt has resumed potassium supplementation and remains on lasix.     Current Outpatient Prescriptions on File Prior to Visit   Medication Sig Dispense Refill    allopurinol (ZYLOPRIM) 100 MG tablet Take 1 tablet (100 mg total) by mouth 3 (three) times daily. 90 tablet 1    amiodarone (PACERONE) 200 MG Tab TAKE 1 TABLET (200 MG TOTAL) BY MOUTH ONCE DAILY. 30 tablet 7    aspirin (ECOTRIN) 81 MG EC tablet Take 81 mg by mouth. 1 Tablet, Delayed Release (E.C.) Oral Every day      carvedilol (COREG) 3.125 MG tablet Take 1 tablet (3.125 mg total) by mouth 2 (two) times daily with meals. 60 tablet 11    cyanocobalamin, vitamin B-12, (VITAMIN B-12) 50 mcg tablet Take 50 mcg by mouth once daily.      ERGOCALCIFEROL, VITAMIN D2, (VITAMIN D ORAL) Take by mouth once daily.      furosemide (LASIX) 40 MG tablet TAKE 1 TABLET BY MOUTH TWICE A DAY AS NEEDED INCREASE TO 2 TABLETS TWICE A DAY FOR ACUTE WEIGHT GAIN 90 tablet 0    potassium chloride SA (K-DUR,KLOR-CON) 20 MEQ tablet Take 1 tablet (20 mEq total) by mouth 2 (two) times daily. 90 tablet 1    VITAMIN E ACETATE (VITAMIN E ORAL) Take by mouth once daily.      metOLazone (ZAROXOLYN) 2.5 MG tablet TAKE 1 TABLET BY MOUTH EVERY OTHER DAY 15 tablet 3     No  current facility-administered medications on file prior to visit.        Review of Systems   Respiratory: Negative for chest tightness and shortness of breath.    Cardiovascular: Negative for chest pain and palpitations.       Objective:     Vitals:    03/17/17 1105   BP: 106/80   Pulse: (!) 53   Resp: 16   Temp: 98.8 °F (37.1 °C)        Physical Exam   Constitutional: He appears well-developed. No distress.   Facial edema has resolved   HENT:   Head: Normocephalic and atraumatic.   Eyes: Conjunctivae are normal. No scleral icterus.   Pulmonary/Chest: Effort normal.   Neurological: He is alert.   Psychiatric: He has a normal mood and affect. His behavior is normal.   Nursing note and vitals reviewed.      Assessment:       1. Chronic combined systolic and diastolic congestive heart failure    2. Angioedema, subsequent encounter        Plan:       Tim was seen today for hospital follow up.    Diagnoses and all orders for this visit:    Angioedema, subsequent encounter  Side effect from therapeutic treatment of his severe systolic CHF. Resolved. Pt is asx at this time.    Chronic combined systolic and diastolic congestive heart failure  -     losartan (COZAAR) 25 MG tablet; Take 1 tablet (25 mg total) by mouth once daily.    Pt and I had a long discussion of pros and cons of trying losartan therapy. There are a significant number of potential benefits including improved cardiac functioning, normalization of his low potassium, renal protection from cardiorenal syndrome. The potential cross reactivity with ACEi angioedema ranges from 1.5-10% per uptodate article accessed today with the patient in the room.    Pt weighed pros and cons, and decided to try ARB therapy, that will be delayed for another 3 weeks incase he has a recurrence of angioedema after ACE continuation which is mentioned in the literature.      IF he has any similar sensation or side effects as he did with lisinopril, he is to stop the medication and  report to the ED immediately. Pt is aware and verbalized understanding that recurent angioedema could be fatal. Given his poor cardiac prognosis, this may be his best bet at restoring EF.                  Return in about 3 months (around 6/17/2017), or if symptoms worsen or fail to improve.        Pt verbalized understanding and agreed with our plan.

## 2017-03-17 NOTE — MR AVS SNAPSHOT
[facility]  [address]  [place] [address]  Phone: [phone]  Fax: [phone]                  Tim Richards   3/17/2017 10:40 AM   Office Visit    Description:  Male : 1965   Provider:  Ja Méndez MD   Department:  [facility]           Reason for Visit     Hospital Follow Up           Diagnoses this Visit        Comments    Chronic combined systolic and diastolic congestive heart failure    -  Primary     Angioedema, subsequent encounter                To Do List           Goals (5 Years of Data)     None      Follow-Up and Disposition     Return if symptoms worsen or fail to improve.       These Medications        Disp Refills Start End    losartan (COZAAR) 25 MG tablet 90 tablet 1 3/17/2017     Take 1 tablet (25 mg total) by mouth once daily. - Oral    Pharmacy: [facility]/pharmacy #[id number] - [place], [place] - [address]  #: [phone]         [facility] On [facility]     [facility] On Call Nurse Care Line -  Assistance  Registered nurses in the [facility] On Call Center provide clinical advisement, health education, appointment booking, and other advisory services.  Call for this free service at [phone].             Medications           Message regarding Medications     Verify the changes and/or additions to your medication regime listed below are the same as discussed with your clinician today.  If any of these changes or additions are incorrect, please notify your healthcare provider.        START taking these NEW medications        Refills    losartan (COZAAR) 25 MG tablet 1    Sig: Take 1 tablet (25 mg total) by mouth once daily.    Class: Print    Route: Oral      STOP taking these medications     hydrALAZINE (APRESOLINE) 25 MG tablet Take 25 mg by mouth.            Verify that the below list of medications is an accurate representation of the medications you are currently taking.  If none reported, the list may be blank. If  "incorrect, please contact your healthcare provider. Carry this list with you in case of emergency.           Current Medications     allopurinol (ZYLOPRIM) 100 MG tablet Take 1 tablet (100 mg total) by mouth 3 (three) times daily.    amiodarone (PACERONE) 200 MG Tab TAKE 1 TABLET (200 MG TOTAL) BY MOUTH ONCE DAILY.    aspirin (ECOTRIN) 81 MG EC tablet Take 81 mg by mouth. 1 Tablet, Delayed Release (E.C.) Oral Every day    carvedilol (COREG) 3.125 MG tablet Take 1 tablet (3.125 mg total) by mouth 2 (two) times daily with meals.    cyanocobalamin, vitamin B-12, (VITAMIN B-12) 50 mcg tablet Take 50 mcg by mouth once daily.    ERGOCALCIFEROL, VITAMIN D2, (VITAMIN D ORAL) Take by mouth once daily.    furosemide (LASIX) 40 MG tablet TAKE 1 TABLET BY MOUTH TWICE A DAY AS NEEDED INCREASE TO 2 TABLETS TWICE A DAY FOR ACUTE WEIGHT GAIN    potassium chloride SA (K-DUR,KLOR-CON) 20 MEQ tablet Take 1 tablet (20 mEq total) by mouth 2 (two) times daily.    VITAMIN E ACETATE (VITAMIN E ORAL) Take by mouth once daily.    losartan (COZAAR) 25 MG tablet Take 1 tablet (25 mg total) by mouth once daily.    metOLazone (ZAROXOLYN) 2.5 MG tablet TAKE 1 TABLET BY MOUTH EVERY OTHER DAY           Clinical Reference Information           Your Vitals Were     BP Pulse Temp Resp Height Weight    106/80 (BP Location: Left arm, Patient Position: Sitting, BP Method: Manual) 53 98.8 °F (37.1 °C) (Oral) 16 6' 1" (1.854 m) 120 kg (264 lb 8.8 oz)    SpO2 BMI             97% 34.9 kg/m2         Blood Pressure          Most Recent Value    BP  106/80      Allergies as of 3/17/2017     Lisinopril      Immunizations Administered on Date of Encounter - 3/17/2017     None      Smoking Cessation     If you would like to quit smoking:   You may be eligible for free services if you are a [place] resident and started smoking cigarettes before September 1, 1988.  Call the [employer]oking [employer]essation [employer] (S[employer]) toll free at (814) 426-4070 or (406) 753-8480.   Call " 1-800-QUIT-NOW if you do not meet the above criteria.            Language Assistance Services     ATTENTION: Language assistance services are available, free of charge. Please call [phone].      ATENCIÓN: Si habhunter honeycutt, tiene a flowers disposición servicios gratuitos de asistencia lingüística. Llame al [phone].     CHÚ Ý: N?u b?n nói Ti?ng Vi?t, có các d?ch v? h? tr? ngôn ng? mi?n phí dành cho b?n. G?i s? [phone].         [facility] complies with applicable Federal civil rights laws and does not discriminate on the basis of race, color, national origin, age, disability, or sex.

## 2017-04-09 DIAGNOSIS — E87.70 HYPERVOLEMIA, UNSPECIFIED HYPERVOLEMIA TYPE: ICD-10-CM

## 2017-04-09 RX ORDER — FUROSEMIDE 40 MG/1
TABLET ORAL
Qty: 90 TABLET | Refills: 0 | Status: SHIPPED | OUTPATIENT
Start: 2017-04-09 | End: 2017-05-08 | Stop reason: SDUPTHER

## 2017-04-10 RX ORDER — METOLAZONE 2.5 MG/1
TABLET ORAL
Qty: 15 TABLET | Refills: 3 | Status: ON HOLD | OUTPATIENT
Start: 2017-04-10 | End: 2018-01-12

## 2017-05-05 RX ORDER — HYDRALAZINE HYDROCHLORIDE 25 MG/1
TABLET, FILM COATED ORAL
Qty: 180 TABLET | Refills: 1 | Status: SHIPPED | OUTPATIENT
Start: 2017-05-05 | End: 2018-01-10

## 2017-05-08 RX ORDER — FUROSEMIDE 40 MG/1
TABLET ORAL
Qty: 90 TABLET | Refills: 0 | Status: SHIPPED | OUTPATIENT
Start: 2017-05-08 | End: 2017-06-07 | Stop reason: SDUPTHER

## 2017-05-22 DIAGNOSIS — E79.0 HYPERURICEMIA: ICD-10-CM

## 2017-05-22 RX ORDER — ALLOPURINOL 100 MG/1
TABLET ORAL
Qty: 90 TABLET | Refills: 1 | Status: SHIPPED | OUTPATIENT
Start: 2017-05-22 | End: 2017-07-23 | Stop reason: SDUPTHER

## 2017-06-07 RX ORDER — FUROSEMIDE 40 MG/1
TABLET ORAL
Qty: 90 TABLET | Refills: 2 | Status: SHIPPED | OUTPATIENT
Start: 2017-06-07 | End: 2017-09-15 | Stop reason: SDUPTHER

## 2017-06-12 RX ORDER — AMIODARONE HYDROCHLORIDE 200 MG/1
TABLET ORAL
Qty: 30 TABLET | Refills: 7 | Status: SHIPPED | OUTPATIENT
Start: 2017-06-12 | End: 2018-04-19 | Stop reason: SDUPTHER

## 2017-06-23 DIAGNOSIS — M10.272 ACUTE DRUG-INDUCED GOUT OF LEFT ANKLE: ICD-10-CM

## 2017-06-23 DIAGNOSIS — E87.5 HYPERKALEMIA: ICD-10-CM

## 2017-06-23 RX ORDER — POTASSIUM CHLORIDE 20 MEQ/1
20 TABLET, EXTENDED RELEASE ORAL 2 TIMES DAILY
Qty: 90 TABLET | Refills: 1 | Status: SHIPPED | OUTPATIENT
Start: 2017-06-23 | End: 2017-09-23 | Stop reason: SDUPTHER

## 2017-06-23 RX ORDER — INDOMETHACIN 50 MG/1
CAPSULE ORAL
Qty: 30 CAPSULE | Refills: 0 | OUTPATIENT
Start: 2017-06-23

## 2017-07-23 DIAGNOSIS — E79.0 HYPERURICEMIA: ICD-10-CM

## 2017-07-24 RX ORDER — ALLOPURINOL 100 MG/1
TABLET ORAL
Qty: 90 TABLET | Refills: 1 | Status: SHIPPED | OUTPATIENT
Start: 2017-07-24 | End: 2017-09-23 | Stop reason: SDUPTHER

## 2017-07-31 ENCOUNTER — HOSPITAL ENCOUNTER (EMERGENCY)
Facility: HOSPITAL | Age: 52
Discharge: LEFT BEFORE DISCHARGE | End: 2017-08-01
Attending: EMERGENCY MEDICINE
Payer: COMMERCIAL

## 2017-07-31 VITALS
OXYGEN SATURATION: 97 % | RESPIRATION RATE: 18 BRPM | DIASTOLIC BLOOD PRESSURE: 61 MMHG | HEART RATE: 76 BPM | TEMPERATURE: 99 F | SYSTOLIC BLOOD PRESSURE: 131 MMHG | HEIGHT: 73 IN | BODY MASS INDEX: 27.43 KG/M2 | WEIGHT: 207 LBS

## 2017-07-31 DIAGNOSIS — R79.89 ELEVATED TROPONIN: ICD-10-CM

## 2017-07-31 DIAGNOSIS — I50.9 HEART FAILURE, UNSPECIFIED HEART FAILURE CHRONICITY, UNSPECIFIED HEART FAILURE TYPE: ICD-10-CM

## 2017-07-31 DIAGNOSIS — E87.6 HYPOKALEMIA: ICD-10-CM

## 2017-07-31 DIAGNOSIS — R55 SYNCOPE: Primary | ICD-10-CM

## 2017-07-31 LAB
ALBUMIN SERPL BCP-MCNC: 4 G/DL
ALP SERPL-CCNC: 64 U/L
ALT SERPL W/O P-5'-P-CCNC: 22 U/L
AMPHET+METHAMPHET UR QL: NEGATIVE
ANION GAP SERPL CALC-SCNC: 12 MMOL/L
AST SERPL-CCNC: 29 U/L
BARBITURATES UR QL SCN>200 NG/ML: NEGATIVE
BASOPHILS # BLD AUTO: 0.01 K/UL
BASOPHILS NFR BLD: 0.2 %
BENZODIAZ UR QL SCN>200 NG/ML: NEGATIVE
BILIRUB SERPL-MCNC: 0.5 MG/DL
BILIRUB UR QL STRIP: NEGATIVE
BNP SERPL-MCNC: 691 PG/ML
BUN SERPL-MCNC: 35 MG/DL
BZE UR QL SCN: NEGATIVE
CALCIUM SERPL-MCNC: 10.3 MG/DL
CANNABINOIDS UR QL SCN: NEGATIVE
CHLORIDE SERPL-SCNC: 96 MMOL/L
CLARITY UR: CLEAR
CO2 SERPL-SCNC: 31 MMOL/L
COLOR UR: YELLOW
CREAT SERPL-MCNC: 2.3 MG/DL
CREAT UR-MCNC: 263.7 MG/DL
DIFFERENTIAL METHOD: NORMAL
EOSINOPHIL # BLD AUTO: 0.1 K/UL
EOSINOPHIL NFR BLD: 1.1 %
ERYTHROCYTE [DISTWIDTH] IN BLOOD BY AUTOMATED COUNT: 14.1 %
EST. GFR  (AFRICAN AMERICAN): 37 ML/MIN/1.73 M^2
EST. GFR  (NON AFRICAN AMERICAN): 32 ML/MIN/1.73 M^2
ETHANOL SERPL-MCNC: 31 MG/DL
GLUCOSE SERPL-MCNC: 151 MG/DL
GLUCOSE UR QL STRIP: NEGATIVE
HCT VFR BLD AUTO: 43.4 %
HGB BLD-MCNC: 14.3 G/DL
HGB UR QL STRIP: NEGATIVE
KETONES UR QL STRIP: NEGATIVE
LACTATE SERPL-SCNC: 1.9 MMOL/L
LEUKOCYTE ESTERASE UR QL STRIP: NEGATIVE
LYMPHOCYTES # BLD AUTO: 1.6 K/UL
LYMPHOCYTES NFR BLD: 30.3 %
MCH RBC QN AUTO: 29.2 PG
MCHC RBC AUTO-ENTMCNC: 32.9 G/DL
MCV RBC AUTO: 89 FL
METHADONE UR QL SCN>300 NG/ML: NEGATIVE
MONOCYTES # BLD AUTO: 0.5 K/UL
MONOCYTES NFR BLD: 9.5 %
NEUTROPHILS # BLD AUTO: 3.2 K/UL
NEUTROPHILS NFR BLD: 58.9 %
NITRITE UR QL STRIP: NEGATIVE
OPIATES UR QL SCN: NEGATIVE
PCP UR QL SCN>25 NG/ML: NEGATIVE
PH UR STRIP: 6 [PH] (ref 5–8)
PLATELET # BLD AUTO: 228 K/UL
PMV BLD AUTO: 10.5 FL
POTASSIUM SERPL-SCNC: 2.4 MMOL/L
PROT SERPL-MCNC: 8.4 G/DL
PROT UR QL STRIP: NEGATIVE
RBC # BLD AUTO: 4.9 M/UL
SODIUM SERPL-SCNC: 139 MMOL/L
SP GR UR STRIP: 1.02 (ref 1–1.03)
TOXICOLOGY INFORMATION: NORMAL
TROPONIN I SERPL DL<=0.01 NG/ML-MCNC: 0.11 NG/ML
URN SPEC COLLECT METH UR: NORMAL
UROBILINOGEN UR STRIP-ACNC: NEGATIVE EU/DL
WBC # BLD AUTO: 5.38 K/UL

## 2017-07-31 PROCEDURE — 83880 ASSAY OF NATRIURETIC PEPTIDE: CPT

## 2017-07-31 PROCEDURE — 85025 COMPLETE CBC W/AUTO DIFF WBC: CPT

## 2017-07-31 PROCEDURE — 84484 ASSAY OF TROPONIN QUANT: CPT

## 2017-07-31 PROCEDURE — 80320 DRUG SCREEN QUANTALCOHOLS: CPT

## 2017-07-31 PROCEDURE — 12000002 HC ACUTE/MED SURGE SEMI-PRIVATE ROOM

## 2017-07-31 PROCEDURE — 99285 EMERGENCY DEPT VISIT HI MDM: CPT

## 2017-07-31 PROCEDURE — 81003 URINALYSIS AUTO W/O SCOPE: CPT

## 2017-07-31 PROCEDURE — 83605 ASSAY OF LACTIC ACID: CPT

## 2017-07-31 PROCEDURE — 80053 COMPREHEN METABOLIC PANEL: CPT

## 2017-07-31 PROCEDURE — 25000003 PHARM REV CODE 250: Performed by: EMERGENCY MEDICINE

## 2017-07-31 PROCEDURE — 93010 ELECTROCARDIOGRAM REPORT: CPT | Mod: ,,, | Performed by: INTERNAL MEDICINE

## 2017-07-31 PROCEDURE — 80307 DRUG TEST PRSMV CHEM ANLYZR: CPT

## 2017-07-31 PROCEDURE — 93005 ELECTROCARDIOGRAM TRACING: CPT

## 2017-07-31 RX ORDER — ACETAMINOPHEN 325 MG/1
650 TABLET ORAL EVERY 8 HOURS PRN
Status: CANCELLED | OUTPATIENT
Start: 2017-08-01

## 2017-07-31 RX ORDER — ONDANSETRON 2 MG/ML
4 INJECTION INTRAMUSCULAR; INTRAVENOUS EVERY 4 HOURS PRN
Status: CANCELLED | OUTPATIENT
Start: 2017-08-01

## 2017-07-31 RX ORDER — SODIUM CHLORIDE 0.9 % (FLUSH) 0.9 %
3 SYRINGE (ML) INJECTION EVERY 8 HOURS
Status: CANCELLED | OUTPATIENT
Start: 2017-08-01

## 2017-07-31 RX ORDER — ASPIRIN 81 MG/1
81 TABLET ORAL ONCE
Status: CANCELLED | OUTPATIENT
Start: 2017-08-01

## 2017-07-31 RX ORDER — POTASSIUM CHLORIDE 20 MEQ/1
60 TABLET, EXTENDED RELEASE ORAL
Status: COMPLETED | OUTPATIENT
Start: 2017-07-31 | End: 2017-07-31

## 2017-07-31 RX ORDER — FAMOTIDINE 20 MG/1
20 TABLET, FILM COATED ORAL DAILY
Status: CANCELLED | OUTPATIENT
Start: 2017-08-01

## 2017-07-31 RX ORDER — POTASSIUM CHLORIDE 20 MEQ/1
60 TABLET, EXTENDED RELEASE ORAL DAILY
Status: CANCELLED | OUTPATIENT
Start: 2017-08-01

## 2017-07-31 RX ORDER — CARVEDILOL 3.12 MG/1
3.12 TABLET ORAL 2 TIMES DAILY WITH MEALS
Status: CANCELLED | OUTPATIENT
Start: 2017-08-01

## 2017-07-31 RX ORDER — AMIODARONE HYDROCHLORIDE 200 MG/1
200 TABLET ORAL DAILY
Status: CANCELLED | OUTPATIENT
Start: 2017-08-01

## 2017-07-31 RX ORDER — ENOXAPARIN SODIUM 100 MG/ML
30 INJECTION SUBCUTANEOUS EVERY 24 HOURS
Status: CANCELLED | OUTPATIENT
Start: 2017-08-01

## 2017-07-31 RX ORDER — LOSARTAN POTASSIUM 25 MG/1
25 TABLET ORAL DAILY
Status: CANCELLED | OUTPATIENT
Start: 2017-08-01

## 2017-07-31 RX ORDER — POTASSIUM CHLORIDE 20 MEQ/1
40 TABLET, EXTENDED RELEASE ORAL DAILY
Status: CANCELLED | OUTPATIENT
Start: 2017-08-01

## 2017-07-31 RX ADMIN — POTASSIUM CHLORIDE 60 MEQ: 1500 TABLET, EXTENDED RELEASE ORAL at 10:07

## 2017-08-01 NOTE — ED TRIAGE NOTES
Pts wife reports pt was sitting in the chair watching TV and talking on the phone with his mother and his eyes rolled to the back of his head and he passed out for like a minute.  Pt does not remember anything.  Does report tightness to both sides of face in his jaw area.  Happened about 8:10 pm.

## 2017-08-01 NOTE — ED NOTES
Attempted to call numbers on face sheet, no answer. Registration report they think they saw pt and wife leave ED, but can't be certain it was them.

## 2017-08-01 NOTE — ED PROVIDER NOTES
"Encounter Date: 7/31/2017    SCRIBE #1 NOTE: I, Yenny Jamel, am scribing for, and in the presence of,  Priya Cuba MD. I have scribed the following portions of the note - Other sections scribed: HPI, ROS, PE.       History     Chief Complaint   Patient presents with    Loss of Consciousness     "I was sitting in the chair watching TV and my wife said my eyes rolled to the back of my head and I passed out for like a minute. I don't remember anything." Happened approx 10 minutes ago.      CC: Syncope    HPI: 51 year old male with CHF and AICD presents to the ED for an evaluation of a syncopal episode 10 minutes PTA to the ED. Pt was sitting down leaning forward while talking to his mother on the phone. Wife reports pt then had his eyes rolled back and his extremities began to twitch and shake. Wife states pt's eyes were not closed during this episode. Pt responded after wife called out to pt multiple times. Pt states he does not remember what happened. Episode lasted for almost 1 minute. No urinary incontinence.    Pt reports he had a stressful day today. He had a court hearing this morning and was on his feet all day at work. 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 He ate normally.     Pt reports having episodes of dizziness that occur "once in a while" at work. However, pt states the dizziness has been occurring more frequently this past week. Pt states he had R hand cramping, primarily to the 4th and 5th digits that began 2 days ago. He attributes the cramping to soreness from working out.    Pt states he is "irregular" with taking his medications. He usually takes his morning pills and skips taking his evening pills.    Pt otherwise denies fever, chest pain, SOB, abdominal pain, N/V/D, changes to urinary habits, and any other associated symptoms.    PMHx: CHF; Elevated LFTs; and HTN    PSHx: Cardiac catheterization (Dec. 2012) and Cardiac defibrillator placement " "(2014)    Cardiologist: Dr. Chiu. The last time he had his defibrillator checked was approximately 5-6 months ago (February or March 2017). He has not been defibrillated. Last stress test was a "few years ago." Last angiogram was in 2013.    Medtronic: [phone]  Implant date: 10/31/2014    Serial #: [id number] // Model #: [id number]  Serial #: [id number] // Model #: [id number]  Serial #: [id number] // Model #: [id number]  Serial #: [id number] // Model #: [id number]      The history is provided by the patient. No  was used.     Review of patient's allergies indicates:   Allergen Reactions    Lisinopril Anaphylaxis     Past Medical History:   Diagnosis Date    CHF (congestive heart failure)     Elevated LFTs     HTN (hypertension)      Past Surgical History:   Procedure Laterality Date    CARDIAC CATHETERIZATION  Dec. 2012    CARDIAC DEFIBRILLATOR PLACEMENT Left      Family History   Problem Relation Age of Onset    Hypertension Father     Diabetes Father     Coronary artery disease Father     No Known Problems Mother      Social History   Substance Use Topics    Smoking status: Current Every Day Smoker     Packs/day: 1.00     Years: 31.00     Types: Cigarettes    Smokeless tobacco: Current User      Comment: pt is quiting on his own - pt stated not qualified for program; 2/16 pt  quit on his own    Alcohol use 0.0 oz/week      Comment: 2 glasses wine daily     Review of Systems   Constitutional: Negative for chills, diaphoresis and fever.   HENT: Negative for ear pain and sore throat.    Eyes: Negative for photophobia and visual disturbance.   Respiratory: Negative for cough and shortness of breath.    Cardiovascular: Negative for chest pain.   Gastrointestinal: Negative for abdominal pain, diarrhea, nausea and vomiting.   Genitourinary: Negative for dysuria and flank pain.        (-) urinary incontinence   Musculoskeletal: Positive for myalgias (R hand muscle cramping). Negative for back " pain and neck stiffness.   Skin: Negative for rash.   Neurological: Positive for syncope. Negative for headaches.       Physical Exam     Initial Vitals [07/31/17 2029]   BP Pulse Resp Temp SpO2   129/64 (!) 50 18 98.7 °F (37.1 °C) 98 %      MAP       85.67         Physical Exam    Nursing note and vitals reviewed.  Constitutional: Vital signs are normal. He appears well-developed and well-nourished. He is not diaphoretic. He is active. No distress.   Awake, alert, appears non-toxic.   HENT:   Head: Normocephalic and atraumatic.   Mouth/Throat: Oropharynx is clear and moist.   Eyes: Conjunctivae and EOM are normal. Pupils are equal, round, and reactive to light.   Neck: Trachea normal and normal range of motion. Neck supple.   Cardiovascular: Normal rate. An irregular rhythm present.   Murmur heard.  Pulmonary/Chest: Breath sounds normal. No respiratory distress. He has no wheezes. He has no rhonchi. He has no rales.   Abdominal: Soft. Normal appearance and bowel sounds are normal. He exhibits no distension. There is no tenderness.   Musculoskeletal: Normal range of motion. He exhibits edema. He exhibits no tenderness.   Trace BLE edema with venous stasis changes.   Neurological: He is alert and oriented to person, place, and time. He has normal strength.   Skin: Skin is warm, dry and intact.   Psychiatric: He has a normal mood and affect.         ED Course   Procedures  Labs Reviewed   COMPREHENSIVE METABOLIC PANEL - Abnormal; Notable for the following:        Result Value    Potassium 2.4 (*)     CO2 31 (*)     Glucose 151 (*)     BUN, Bld 35 (*)     Creatinine 2.3 (*)     eGFR if  37 (*)     eGFR if non  32 (*)     All other components within normal limits    Narrative:      Potassium  critical result(s) called and verbal readback obtained   from Luli Montanez, 07/31/2017 21:33   B-TYPE NATRIURETIC PEPTIDE - Abnormal; Notable for the following:      (*)     All other  components within normal limits   TROPONIN I - Abnormal; Notable for the following:     Troponin I 0.106 (*)     All other components within normal limits   ALCOHOL,MEDICAL (ETHANOL) - Abnormal; Notable for the following:     Alcohol, Medical, Serum 31 (*)     All other components within normal limits   CBC W/ AUTO DIFFERENTIAL   LACTIC ACID, PLASMA   DRUG SCREEN PANEL, URINE EMERGENCY   URINALYSIS     EKG Readings: (Independently Interpreted)   20:33: Paced, HR 88. Variable complexes. No STEMI.       X-Rays:   Independently Interpreted Readings:   Other Readings:  CXR cardiomegaly, mild pulmonary edema    Medical Decision Making:   History:   Old Medical Records: I decided to obtain old medical records.  Old Records Summarized: records from previous admission(s).  Initial Assessment:   This is an emergent evaluation of a 51 y.o. Male s/p syncopal episode today.  Differential Diagnosis:   Ddx includes dysrhythmia, CHF, ACS, ICH, CVA, WAGNER, symptomatic anemia, other.  Independently Interpreted Test(s):   I have ordered and independently interpreted X-rays - see prior notes.  I have ordered and independently interpreted EKG Reading(s) - see prior notes  Clinical Tests:   Lab Tests: Ordered and Reviewed  Radiological Study: Ordered and Reviewed  Medical Tests: Ordered and Reviewed  ED Management:  Labs with elevated troponin (0.106). Stable renal insufficiency (BUN 35/creatinine 2.3). CXR not significantly worse than prior. EKG shows paced rhythm, ?PVCs.     Plan had been to admit patient for syncope and new high troponin as he has significant CHF (EF 8/10/16 10-15%) and his syncope (rapid onset/recovery) sounds c/w cardiogenic and no other convincing etiology by ED workup.    Patient did not wish to stay for admission. His wife convinced him otherwise but then both patient and wife eloped from ED after admission.             Scribe Attestation:   Scribe #1: I performed the above scribed service and the documentation  accurately describes the services I performed. I attest to the accuracy of the note.    Attending Attestation:           Physician Attestation for Scribe:  Physician Attestation Statement for Scribe #1: I, Priya Cuba MD, reviewed documentation, as scribed by Yenny Mccullough in my presence, and it is both accurate and complete.                 ED Course     Clinical Impression:   The primary encounter diagnosis was Syncope. Diagnoses of Hypokalemia, Heart failure, unspecified heart failure chronicity, unspecified heart failure type, and Elevated troponin were also pertinent to this visit.                     Priya Cuba MD  08/04/17 1031

## 2017-08-01 NOTE — ED NOTES
Pt was ambulating in the hallway around the nurses station, nurse thought pt was ambulating to the restroom.

## 2017-09-15 RX ORDER — FUROSEMIDE 40 MG/1
TABLET ORAL
Qty: 90 TABLET | Refills: 2 | Status: SHIPPED | OUTPATIENT
Start: 2017-09-15 | End: 2017-11-18 | Stop reason: SDUPTHER

## 2017-09-23 DIAGNOSIS — E87.5 HYPERKALEMIA: ICD-10-CM

## 2017-09-23 DIAGNOSIS — E79.0 HYPERURICEMIA: ICD-10-CM

## 2017-09-25 RX ORDER — ALLOPURINOL 100 MG/1
TABLET ORAL
Qty: 90 TABLET | Refills: 1 | Status: SHIPPED | OUTPATIENT
Start: 2017-09-25 | End: 2017-10-05 | Stop reason: SDUPTHER

## 2017-09-25 RX ORDER — POTASSIUM CHLORIDE 20 MEQ/1
20 TABLET, EXTENDED RELEASE ORAL 2 TIMES DAILY
Qty: 90 TABLET | Refills: 1 | Status: SHIPPED | OUTPATIENT
Start: 2017-09-25 | End: 2017-12-13 | Stop reason: SDUPTHER

## 2017-10-05 ENCOUNTER — OFFICE VISIT (OUTPATIENT)
Dept: FAMILY MEDICINE | Facility: CLINIC | Age: 52
End: 2017-10-05
Payer: MEDICAID

## 2017-10-05 ENCOUNTER — TELEPHONE (OUTPATIENT)
Dept: FAMILY MEDICINE | Facility: CLINIC | Age: 52
End: 2017-10-05

## 2017-10-05 ENCOUNTER — LAB VISIT (OUTPATIENT)
Dept: LAB | Facility: HOSPITAL | Age: 52
End: 2017-10-05
Attending: FAMILY MEDICINE
Payer: MEDICAID

## 2017-10-05 ENCOUNTER — DOCUMENTATION ONLY (OUTPATIENT)
Dept: FAMILY MEDICINE | Facility: CLINIC | Age: 52
End: 2017-10-05

## 2017-10-05 VITALS
WEIGHT: 270.5 LBS | HEART RATE: 96 BPM | DIASTOLIC BLOOD PRESSURE: 80 MMHG | TEMPERATURE: 99 F | BODY MASS INDEX: 35.85 KG/M2 | HEIGHT: 73 IN | RESPIRATION RATE: 16 BRPM | OXYGEN SATURATION: 98 % | SYSTOLIC BLOOD PRESSURE: 120 MMHG

## 2017-10-05 DIAGNOSIS — Z23 FLU VACCINE NEED: ICD-10-CM

## 2017-10-05 DIAGNOSIS — I50.41 ACUTE COMBINED SYSTOLIC AND DIASTOLIC HEART FAILURE: ICD-10-CM

## 2017-10-05 DIAGNOSIS — M10.272 ACUTE DRUG-INDUCED GOUT OF LEFT ANKLE: ICD-10-CM

## 2017-10-05 DIAGNOSIS — E87.6 HYPOKALEMIA: ICD-10-CM

## 2017-10-05 DIAGNOSIS — F17.210 CIGARETTE NICOTINE DEPENDENCE WITHOUT COMPLICATION: ICD-10-CM

## 2017-10-05 DIAGNOSIS — E79.0 HYPERURICEMIA: ICD-10-CM

## 2017-10-05 DIAGNOSIS — Z12.11 COLON CANCER SCREENING: ICD-10-CM

## 2017-10-05 DIAGNOSIS — E87.6 HYPOKALEMIA: Primary | ICD-10-CM

## 2017-10-05 LAB
MAGNESIUM SERPL-MCNC: 1.7 MG/DL
POTASSIUM SERPL-SCNC: 3.2 MMOL/L

## 2017-10-05 PROCEDURE — 83735 ASSAY OF MAGNESIUM: CPT

## 2017-10-05 PROCEDURE — 90471 IMMUNIZATION ADMIN: CPT | Mod: PBBFAC,PO

## 2017-10-05 PROCEDURE — 99215 OFFICE O/P EST HI 40 MIN: CPT | Mod: PBBFAC,PO | Performed by: FAMILY MEDICINE

## 2017-10-05 PROCEDURE — 99214 OFFICE O/P EST MOD 30 MIN: CPT | Mod: S$PBB,,, | Performed by: FAMILY MEDICINE

## 2017-10-05 PROCEDURE — 84132 ASSAY OF SERUM POTASSIUM: CPT

## 2017-10-05 PROCEDURE — 99999 PR PBB SHADOW E&M-EST. PATIENT-LVL V: CPT | Mod: PBBFAC,,, | Performed by: FAMILY MEDICINE

## 2017-10-05 PROCEDURE — 36415 COLL VENOUS BLD VENIPUNCTURE: CPT | Mod: PO

## 2017-10-05 RX ORDER — PREDNISONE 20 MG/1
40 TABLET ORAL DAILY
Qty: 10 TABLET | Refills: 0 | Status: SHIPPED | OUTPATIENT
Start: 2017-10-05 | End: 2017-10-10

## 2017-10-05 RX ORDER — ALLOPURINOL 100 MG/1
100 TABLET ORAL 2 TIMES DAILY
Qty: 60 TABLET | Refills: 1 | Status: SHIPPED | OUTPATIENT
Start: 2017-10-05 | End: 2017-11-27

## 2017-10-05 RX ORDER — INDOMETHACIN 50 MG/1
CAPSULE ORAL
Qty: 30 CAPSULE | Refills: 0 | Status: CANCELLED | OUTPATIENT
Start: 2017-10-05

## 2017-10-05 NOTE — PROGRESS NOTES
Pt tolerated flu vaccine to left deltoid without difficulty; no adverse reaction noted; VIS given

## 2017-10-05 NOTE — PROGRESS NOTES
Subjective:       Patient ID: Tim Richards is a 51 y.o. male.    Chief Complaint: Hospital Follow Up (follow up )    HPI    ED f/u syncopal episode - Pt syncopized and presented to the ED and had troponin elevation and hypokalemia. He was recc'd to stay for eval during an admission, but he declined.     Hypokalemia - pt to have his potassium checked today. He has been adherent with oral K.     Nicotine dependence- Pt tried to quit 2 weeks ago, and signed up for smoking cessation class.     Gout - adherent with allopurinol BID. Pt is completely out of naproxen is requesting a refill today. His most recent Episode Was in His Left Wrist Which Occurred 6 Weeks Ago He Is Symptom Free Today.    BRBPR - patient has rare blood per rectum particularly after hard bowel movements. This last occurred 3 months ago.  Current Outpatient Prescriptions on File Prior to Visit   Medication Sig Dispense Refill    allopurinol (ZYLOPRIM) 100 MG tablet TAKE 1 TABLET (100 MG TOTAL) BY MOUTH 3 (THREE) TIMES DAILY. 90 tablet 1    amiodarone (PACERONE) 200 MG Tab TAKE 1 TABLET (200 MG TOTAL) BY MOUTH ONCE DAILY. 30 tablet 7    aspirin (ECOTRIN) 81 MG EC tablet Take 81 mg by mouth. 1 Tablet, Delayed Release (E.C.) Oral Every day      carvedilol (COREG) 3.125 MG tablet Take 1 tablet (3.125 mg total) by mouth 2 (two) times daily with meals. 60 tablet 11    ERGOCALCIFEROL, VITAMIN D2, (VITAMIN D ORAL) Take by mouth once daily.      furosemide (LASIX) 40 MG tablet TAKE 1 TABLET BY MOUTH TWICE A DAY AS NEEDED.INCREASE TO 2TABLETS TWICE A DAY FOR ACUTE WEIGHT GAIN 90 tablet 2    losartan (COZAAR) 25 MG tablet Take 1 tablet (25 mg total) by mouth once daily. 90 tablet 1    metOLazone (ZAROXOLYN) 2.5 MG tablet TAKE 1 TABLET BY MOUTH EVERY OTHER DAY 15 tablet 3    potassium chloride SA (K-DUR,KLOR-CON) 20 MEQ tablet TAKE 1 TABLET (20 MEQ TOTAL) BY MOUTH 2 (TWO) TIMES DAILY. 90 tablet 1    VITAMIN E ACETATE (VITAMIN E ORAL) Take by mouth  once daily.      cyanocobalamin, vitamin B-12, (VITAMIN B-12) 50 mcg tablet Take 50 mcg by mouth once daily.      hydrALAZINE (APRESOLINE) 25 MG tablet TAKE 1 TABLET BY MOUTH EVERY 12 HOURS. 180 tablet 1    [DISCONTINUED] indomethacin (INDOCIN) 50 MG capsule TAKE ONE CAPSULE BY MOUTH 3 TIMES A DAY WITH EACH MEAL 30 capsule 0     No current facility-administered medications on file prior to visit.        Past Medical History:   Diagnosis Date    CHF (congestive heart failure)     Elevated LFTs     HTN (hypertension)        Family History   Problem Relation Age of Onset    Hypertension Father     Diabetes Father     Coronary artery disease Father     No Known Problems Mother         reports that he has been smoking Cigarettes.  He has a 31.00 pack-year smoking history. He has never used smokeless tobacco. He reports that he drinks alcohol. He reports that he does not use drugs.    Review of Systems   Respiratory: Negative for shortness of breath and wheezing.    Cardiovascular: Positive for palpitations. Negative for chest pain.        COLEMAN       Objective:     Vitals:    10/05/17 1540   BP: 120/80   Pulse: 96   Resp: 16   Temp: 98.7 °F (37.1 °C)        Physical Exam   Constitutional: He appears well-developed. No distress.   HENT:   Head: Normocephalic and atraumatic.   Eyes: Conjunctivae are normal. No scleral icterus.   Pulmonary/Chest: Effort normal.   Neurological: He is alert.   Psychiatric: He has a normal mood and affect. His behavior is normal.   Nursing note and vitals reviewed.      Assessment:       1. Hypokalemia    2. Colon cancer screening    3. Flu vaccine need    4. Acute drug-induced gout of left ankle    5. Cigarette nicotine dependence without complication        Plan:       Tim was seen today for hospital follow up.    Diagnoses and all orders for this visit:    HFrEF 10% s/p AICD placement  Patient missed his appointment with cardiology on August 15. I asked patient to call cardiology  tomorrow and to get the next available appointment to see his cardiologist. He is due for repeat echo to establish what his ejection fraction is. His symptoms are near his baseline, but suboptimal with frequent dyspnea on exertion with light physical activity.    Hypokalemia  -     Potassium; Future  -     Magnesium; Future  Patient is adherent with ANN-MARIE twice a day. I'll check potassium and magnesium levels today.    Colon cancer screening  -     Case request GI: COLONOSCOPY  Although patient is high risk, since he has occasional bright red blood per rectum he is not a good candidate for the Thick-It. I asked him to discuss medical clearance for colonoscopy with his cardiologist.    Flu vaccine need  -     Influenza - Quadrivalent (3 years & older) (PF)    Acute drug-induced gout of left ankle  -     predniSONE (DELTASONE) 20 MG tablet; Take 2 tablets (40 mg total) by mouth once daily.  I discontinued the patient's indomethacin due to his chronic kidney disease. I gave him a course of steroids to take if he develops another flare. Her symptoms stay adherent with twice a day dosing of allopurinol    Cigarette nicotine dependence without complication  -     Ambulatory referral to Smoking Cessation Program  Strongly urged patient to quit smoking. He agreed to meet with Mrs. Childs during a smoking cessation visit. Patient and his wife are going to attempt to quit together            Return in about 3 months (around 1/5/2018) for hypokalemia.        Pt verbalized understanding and agreed with our plan.

## 2017-10-12 ENCOUNTER — HOSPITAL ENCOUNTER (OUTPATIENT)
Dept: RADIOLOGY | Facility: HOSPITAL | Age: 52
Discharge: HOME | End: 2017-10-12
Attending: INTERNAL MEDICINE
Payer: MEDICAID

## 2017-10-12 ENCOUNTER — OFFICE VISIT (OUTPATIENT)
Dept: CARDIOLOGY | Facility: CLINIC | Age: 52
End: 2017-10-12
Payer: MEDICAID

## 2017-10-12 VITALS
DIASTOLIC BLOOD PRESSURE: 74 MMHG | HEART RATE: 84 BPM | OXYGEN SATURATION: 99 % | WEIGHT: 266.75 LBS | BODY MASS INDEX: 35.19 KG/M2 | SYSTOLIC BLOOD PRESSURE: 119 MMHG

## 2017-10-12 DIAGNOSIS — Z95.810 BIVENTRICULAR IMPLANTABLE CARDIOVERTER-DEFIBRILLATOR IN SITU: ICD-10-CM

## 2017-10-12 DIAGNOSIS — I50.41 ACUTE COMBINED SYSTOLIC AND DIASTOLIC HEART FAILURE: ICD-10-CM

## 2017-10-12 DIAGNOSIS — Z72.0 TOBACCO ABUSE: ICD-10-CM

## 2017-10-12 DIAGNOSIS — I10 ESSENTIAL HYPERTENSION: ICD-10-CM

## 2017-10-12 DIAGNOSIS — F10.10 ALCOHOL ABUSE: ICD-10-CM

## 2017-10-12 DIAGNOSIS — I50.41 ACUTE COMBINED SYSTOLIC AND DIASTOLIC HEART FAILURE: Primary | ICD-10-CM

## 2017-10-12 PROCEDURE — 71020 XR CHEST PA AND LATERAL: CPT | Mod: 26,,, | Performed by: RADIOLOGY

## 2017-10-12 PROCEDURE — 99214 OFFICE O/P EST MOD 30 MIN: CPT | Mod: S$PBB,,, | Performed by: INTERNAL MEDICINE

## 2017-10-12 PROCEDURE — 71020 XR CHEST PA AND LATERAL: CPT | Mod: TC

## 2017-10-12 PROCEDURE — 99213 OFFICE O/P EST LOW 20 MIN: CPT | Mod: PBBFAC,25 | Performed by: INTERNAL MEDICINE

## 2017-10-12 PROCEDURE — 99999 PR PBB SHADOW E&M-EST. PATIENT-LVL III: CPT | Mod: PBBFAC,,, | Performed by: INTERNAL MEDICINE

## 2017-10-12 NOTE — PROGRESS NOTES
Subjective:    Patient ID:  Tim Richards is a 51 y.o. male who presents for follow-up of Hospital Follow Up      HPI   previous history:  Here for f/u of severe nonischemic cardiomyopathy.  Last seen in clinic in November 2015.  He's been doing very well.  He feels like he can do whatever he wants.  He has felt a lot better after biventricular ICD placement.  He denies any chest pain, shortness breath or palpitations.  He's expands no PND, orthopnea or lower edema.  He denies any dizziness, presyncope or syncope.  Not had any hospitalizations for volume overload.  He is not as device check in over a year.    Today:  Here for follow-up of chronic systolic heart failure.  He's not been seen in several months.  He has device last checked in February.  He was otherwise doing well until he stopped his medicines few weeks ago.  He presented the emergency room but left AMA because he didn't want to lose his job the next day.  He says his been feeling short of breath with heavier exertion but relieved with rest.  He denies any PND, orthopnea or lower edema.  He's not expressing dizziness, presyncope or syncope.     Review of Systems   Constitution: Negative.   HENT: Negative.    Eyes: Negative.    Cardiovascular: Positive for dyspnea on exertion. Negative for chest pain, irregular heartbeat, leg swelling, near-syncope, orthopnea, palpitations, paroxysmal nocturnal dyspnea and syncope.   Respiratory: Positive for shortness of breath.    Skin: Negative.    Musculoskeletal: Negative.    Gastrointestinal: Negative for abdominal pain, constipation and diarrhea.   Genitourinary: Negative for dysuria.   Neurological: Negative for dizziness.   Psychiatric/Behavioral: Negative.         Objective:    Physical Exam   Constitutional: He is oriented to person, place, and time. He appears well-developed and well-nourished. No distress.   HENT:   Head: Normocephalic and atraumatic.   Eyes: Conjunctivae and EOM are normal. Pupils are  equal, round, and reactive to light.   Neck: Normal range of motion. Neck supple. No thyromegaly present.   Cardiovascular: Normal rate, regular rhythm and normal heart sounds.    No murmur heard.  Pulmonary/Chest: Effort normal and breath sounds normal. No respiratory distress. He has no wheezes. He has no rales. He exhibits no tenderness.   Abdominal: Soft. Bowel sounds are normal.   Musculoskeletal: He exhibits no edema.   Neurological: He is alert and oriented to person, place, and time.   Skin: Skin is warm and dry.   Psychiatric: He has a normal mood and affect. His behavior is normal.           Assessment:       1. Acute combined systolic and diastolic heart failure    2. Biventricular implantable cardioverter-defibrillator in situ    3. Essential hypertension    4. Tobacco abuse    5. Alcohol abuse         Plan:       -schedule Medtronic AICD check next available  -Check labs and chest x-ray  -Check echocardiogram  -cont current med tx  -No Ace her arm secondary to renal insufficiency  -Previously refused BiDil secondary to taking Cialis    RTC 2 weeks with Medtronic check

## 2017-10-13 ENCOUNTER — HOSPITAL ENCOUNTER (OUTPATIENT)
Dept: CARDIOLOGY | Facility: HOSPITAL | Age: 52
Discharge: HOME | End: 2017-10-13
Attending: INTERNAL MEDICINE
Payer: MEDICAID

## 2017-10-13 DIAGNOSIS — I50.41 ACUTE COMBINED SYSTOLIC AND DIASTOLIC HEART FAILURE: ICD-10-CM

## 2017-10-13 LAB
DIASTOLIC DYSFUNCTION: YES
ESTIMATED PA SYSTOLIC PRESSURE: 34.89
MITRAL VALVE MOBILITY: NORMAL
MITRAL VALVE REGURGITATION: ABNORMAL
RETIRED EF AND QEF - SEE NOTES: 10 (ref 55–65)
TRICUSPID VALVE REGURGITATION: ABNORMAL

## 2017-10-13 PROCEDURE — 93306 TTE W/DOPPLER COMPLETE: CPT | Mod: 26,,, | Performed by: INTERNAL MEDICINE

## 2017-10-13 PROCEDURE — 93306 TTE W/DOPPLER COMPLETE: CPT

## 2017-10-26 ENCOUNTER — OFFICE VISIT (OUTPATIENT)
Dept: CARDIOLOGY | Facility: CLINIC | Age: 52
End: 2017-10-26
Payer: MEDICAID

## 2017-10-26 VITALS
WEIGHT: 266.75 LBS | OXYGEN SATURATION: 99 % | HEART RATE: 80 BPM | DIASTOLIC BLOOD PRESSURE: 85 MMHG | SYSTOLIC BLOOD PRESSURE: 138 MMHG | BODY MASS INDEX: 35.19 KG/M2

## 2017-10-26 DIAGNOSIS — I10 ESSENTIAL HYPERTENSION: ICD-10-CM

## 2017-10-26 DIAGNOSIS — F10.10 ALCOHOL ABUSE: ICD-10-CM

## 2017-10-26 DIAGNOSIS — Z95.810 BIVENTRICULAR IMPLANTABLE CARDIOVERTER-DEFIBRILLATOR IN SITU: ICD-10-CM

## 2017-10-26 DIAGNOSIS — I50.41 ACUTE COMBINED SYSTOLIC AND DIASTOLIC HEART FAILURE: Primary | ICD-10-CM

## 2017-10-26 DIAGNOSIS — Z72.0 TOBACCO ABUSE: ICD-10-CM

## 2017-10-26 PROCEDURE — 93289 INTERROG DEVICE EVAL HEART: CPT | Mod: PBBFAC | Performed by: INTERNAL MEDICINE

## 2017-10-26 PROCEDURE — 99214 OFFICE O/P EST MOD 30 MIN: CPT | Mod: S$PBB,,, | Performed by: INTERNAL MEDICINE

## 2017-10-26 PROCEDURE — 93289 INTERROG DEVICE EVAL HEART: CPT | Mod: 26,S$PBB,, | Performed by: INTERNAL MEDICINE

## 2017-10-26 PROCEDURE — 99213 OFFICE O/P EST LOW 20 MIN: CPT | Mod: PBBFAC | Performed by: INTERNAL MEDICINE

## 2017-10-26 PROCEDURE — 99999 PR PBB SHADOW E&M-EST. PATIENT-LVL III: CPT | Mod: PBBFAC,,, | Performed by: INTERNAL MEDICINE

## 2017-10-26 NOTE — PROGRESS NOTES
Subjective:    Patient ID:  Tim Richards is a 51 y.o. male who presents for follow-up of Follow-up (device ck )      HPI   previous history:  Here for follow-up of chronic systolic heart failure.  He's not been seen in several months.  He has device last checked in February.  He was otherwise doing well until he stopped his medicines few weeks ago.  He presented the emergency room but left AMA because he didn't want to lose his job the next day.  He says his been feeling short of breath with heavier exertion but relieved with rest.  He denies any PND, orthopnea or lower edema.  He's not expressing dizziness, presyncope or syncope.    Today:  Here for follow-up of chronic systolic heart failure.  He also had his device check today.  He is doing much better now that he's back on his medicines.  He started his new job and likes it without any issues.  He looks good and feels good when he takes his medicines.  He denies any chest pain, shortness of breath or palpitations.  He's express no PND, orthopnea or lower edema.  He denies any dizziness, presyncope or syncope.  Does get shortness of breath on heavier exertion but relieved with rest.     Review of Systems   Constitution: Negative.   HENT: Negative.    Eyes: Negative.    Cardiovascular: Positive for dyspnea on exertion. Negative for chest pain, irregular heartbeat, leg swelling, near-syncope, orthopnea, palpitations, paroxysmal nocturnal dyspnea and syncope.   Respiratory: Positive for shortness of breath.    Skin: Negative.    Musculoskeletal: Negative.    Gastrointestinal: Negative for abdominal pain, constipation and diarrhea.   Genitourinary: Negative for dysuria.   Neurological: Negative for dizziness.   Psychiatric/Behavioral: Negative.         Objective:    Physical Exam   Constitutional: He is oriented to person, place, and time. He appears well-developed and well-nourished. No distress.   HENT:   Head: Normocephalic and atraumatic.   Eyes:  Conjunctivae and EOM are normal. Pupils are equal, round, and reactive to light.   Neck: Normal range of motion. Neck supple. No thyromegaly present.   Cardiovascular: Normal rate, regular rhythm and normal heart sounds.    No murmur heard.  Pulmonary/Chest: Effort normal and breath sounds normal. No respiratory distress. He has no wheezes. He has no rales. He exhibits no tenderness.   Abdominal: Soft. Bowel sounds are normal.   Musculoskeletal: He exhibits no edema.   Neurological: He is alert and oriented to person, place, and time.   Skin: Skin is warm and dry.   Psychiatric: He has a normal mood and affect. His behavior is normal.         Echo:  CONCLUSIONS     1 - Severely depressed left ventricular systolic function (EF 10-15%).  Severe global hypokinesis, septum appears to contract best.     2 - Eccentric hypertrophy.     3 - Severe left ventricular enlargement.     4 - Restrictive LV filling pattern, indicating markedly elevated LAP (grade 3 diastolic dysfunction).     5 - Mildly depressed right ventricular systolic function .     6 - Moderate mitral regurgitation.  Eccentric jet may underestimate degree of MR.     7 - Trivial tricuspid regurgitation.     8 - The estimated PA systolic pressure is 35 mmHg.     LDL-89    Assessment:       1. Acute combined systolic and diastolic heart failure    2. Biventricular implantable cardioverter-defibrillator in situ    3. Essential hypertension    4. Tobacco abuse    5. Alcohol abuse         Plan:       -schedule Medtronic AICD check next available  -cont current med tx  -No Ace her arm secondary to renal insufficiency  -Previously refused BiDil secondary to taking Cialis    RTC 3 months with Medtronic check            Medtronic device check:  Model: Viva Quad  Mode DDD lower rate of 50 bpm    A paced 0%, ventricular paced 95%    A: 2.6 mV, 513 ohms, 1 V at 0.4 ms  RV: 4.9 mV, 418 ohms, 0.875 V at 0.4 ms  LV: 456 ohms, 0.625 V at 0.6 ms    Patient had one  defibrillation successfully converted to normal sinus rhythm on 7-30 1-17  No changes    Return to clinic in 3 months

## 2017-10-28 DIAGNOSIS — I50.42 CHRONIC COMBINED SYSTOLIC AND DIASTOLIC CONGESTIVE HEART FAILURE: ICD-10-CM

## 2017-10-31 RX ORDER — LOSARTAN POTASSIUM 25 MG/1
TABLET ORAL
Qty: 90 TABLET | Refills: 1 | Status: SHIPPED | OUTPATIENT
Start: 2017-10-31 | End: 2018-01-10

## 2017-11-20 RX ORDER — FUROSEMIDE 40 MG/1
TABLET ORAL
Qty: 90 TABLET | Refills: 2 | Status: ON HOLD | OUTPATIENT
Start: 2017-11-20 | End: 2017-12-23 | Stop reason: HOSPADM

## 2017-11-26 DIAGNOSIS — E79.0 HYPERURICEMIA: ICD-10-CM

## 2017-11-27 RX ORDER — ALLOPURINOL 100 MG/1
TABLET ORAL
Qty: 90 TABLET | Refills: 1 | Status: SHIPPED | OUTPATIENT
Start: 2017-11-27 | End: 2018-01-31 | Stop reason: SDUPTHER

## 2017-12-13 DIAGNOSIS — E87.5 HYPERKALEMIA: ICD-10-CM

## 2017-12-13 RX ORDER — POTASSIUM CHLORIDE 20 MEQ/1
20 TABLET, EXTENDED RELEASE ORAL 2 TIMES DAILY
Qty: 90 TABLET | Refills: 1 | Status: ON HOLD | OUTPATIENT
Start: 2017-12-13 | End: 2017-12-23

## 2017-12-19 ENCOUNTER — TELEPHONE (OUTPATIENT)
Dept: FAMILY MEDICINE | Facility: CLINIC | Age: 52
End: 2017-12-19

## 2017-12-19 NOTE — TELEPHONE ENCOUNTER
----- Message from Mirela robinjero sent at 12/19/2017  3:35 PM CST -----  Contact: self  Pt states he is having congestion and wheezing. He is asking for an appt tomorrow. Pt has Medicaid and no available appt through Feb. Pt call back [phone]

## 2017-12-19 NOTE — TELEPHONE ENCOUNTER
CHARLES, I scheduled pt in your urgent 11:20 spot tomorrow because he has hx of CHF and is c/o congestion and wheezing

## 2017-12-19 NOTE — TELEPHONE ENCOUNTER
----- Message from Xochitl Forde sent at 12/19/2017  3:45 PM CST -----  Contact: self  Patient was seen in urgent care a week ago for congestion, can't sleep because of congest. Patient is feeling worst patient has CHF. Need to see doctor sooner than 2/8/2018. (medicaid)     Patient can be reached at [phone].

## 2017-12-21 ENCOUNTER — HOSPITAL ENCOUNTER (OUTPATIENT)
Facility: HOSPITAL | Age: 52
LOS: 1 days | Discharge: HOME | End: 2017-12-23
Attending: EMERGENCY MEDICINE | Admitting: INTERNAL MEDICINE
Payer: MEDICAID

## 2017-12-21 DIAGNOSIS — E87.5 HYPERKALEMIA: ICD-10-CM

## 2017-12-21 DIAGNOSIS — N28.9 RENAL INSUFFICIENCY: ICD-10-CM

## 2017-12-21 DIAGNOSIS — R56.9 CONVULSIONS, UNSPECIFIED CONVULSION TYPE: ICD-10-CM

## 2017-12-21 DIAGNOSIS — R00.2 PALPITATIONS: ICD-10-CM

## 2017-12-21 DIAGNOSIS — I50.42 CHRONIC COMBINED SYSTOLIC AND DIASTOLIC CONGESTIVE HEART FAILURE: ICD-10-CM

## 2017-12-21 DIAGNOSIS — I50.41 ACUTE COMBINED SYSTOLIC AND DIASTOLIC HEART FAILURE: ICD-10-CM

## 2017-12-21 DIAGNOSIS — I95.1 ORTHOSTASIS: ICD-10-CM

## 2017-12-21 DIAGNOSIS — R55 SYNCOPE, UNSPECIFIED SYNCOPE TYPE: Primary | ICD-10-CM

## 2017-12-21 DIAGNOSIS — R53.1 WEAKNESS: ICD-10-CM

## 2017-12-21 LAB
ALBUMIN SERPL BCP-MCNC: 3.9 G/DL
ALP SERPL-CCNC: 74 U/L
ALT SERPL W/O P-5'-P-CCNC: 22 U/L
ANION GAP SERPL CALC-SCNC: 15 MMOL/L
AST SERPL-CCNC: 27 U/L
BASOPHILS # BLD AUTO: 0.01 K/UL
BASOPHILS NFR BLD: 0.2 %
BILIRUB SERPL-MCNC: 0.8 MG/DL
BILIRUB UR QL STRIP: NEGATIVE
BUN SERPL-MCNC: 41 MG/DL
CALCIUM SERPL-MCNC: 10.3 MG/DL
CHLORIDE SERPL-SCNC: 93 MMOL/L
CLARITY UR: CLEAR
CO2 SERPL-SCNC: 30 MMOL/L
COLOR UR: YELLOW
CREAT SERPL-MCNC: 2.1 MG/DL
DIFFERENTIAL METHOD: ABNORMAL
EOSINOPHIL # BLD AUTO: 0.1 K/UL
EOSINOPHIL NFR BLD: 1 %
ERYTHROCYTE [DISTWIDTH] IN BLOOD BY AUTOMATED COUNT: 15.7 %
EST. GFR  (AFRICAN AMERICAN): 41 ML/MIN/1.73 M^2
EST. GFR  (NON AFRICAN AMERICAN): 35 ML/MIN/1.73 M^2
GLUCOSE SERPL-MCNC: 89 MG/DL
GLUCOSE UR QL STRIP: NEGATIVE
HCT VFR BLD AUTO: 44.9 %
HGB BLD-MCNC: 14.5 G/DL
HGB UR QL STRIP: NEGATIVE
KETONES UR QL STRIP: NEGATIVE
LEUKOCYTE ESTERASE UR QL STRIP: NEGATIVE
LYMPHOCYTES # BLD AUTO: 1.3 K/UL
LYMPHOCYTES NFR BLD: 21.2 %
MAGNESIUM SERPL-MCNC: 2.4 MG/DL
MCH RBC QN AUTO: 28.4 PG
MCHC RBC AUTO-ENTMCNC: 32.3 G/DL
MCV RBC AUTO: 88 FL
MICROSCOPIC COMMENT: NORMAL
MONOCYTES # BLD AUTO: 0.8 K/UL
MONOCYTES NFR BLD: 12.2 %
NEUTROPHILS # BLD AUTO: 4.1 K/UL
NEUTROPHILS NFR BLD: 65.1 %
NITRITE UR QL STRIP: NEGATIVE
PH UR STRIP: 7 [PH] (ref 5–8)
PLATELET # BLD AUTO: 251 K/UL
PMV BLD AUTO: 10.9 FL
POTASSIUM SERPL-SCNC: 3.3 MMOL/L
PROT SERPL-MCNC: 7.8 G/DL
PROT UR QL STRIP: NEGATIVE
RBC # BLD AUTO: 5.1 M/UL
RBC #/AREA URNS HPF: 1 /HPF (ref 0–4)
SODIUM SERPL-SCNC: 138 MMOL/L
SP GR UR STRIP: 1.01 (ref 1–1.03)
TROPONIN I SERPL DL<=0.01 NG/ML-MCNC: 0.09 NG/ML
URN SPEC COLLECT METH UR: NORMAL
UROBILINOGEN UR STRIP-ACNC: NEGATIVE EU/DL
WBC # BLD AUTO: 6.24 K/UL

## 2017-12-21 PROCEDURE — G0378 HOSPITAL OBSERVATION PER HR: HCPCS

## 2017-12-21 PROCEDURE — 81000 URINALYSIS NONAUTO W/SCOPE: CPT

## 2017-12-21 PROCEDURE — 21400001 HC TELEMETRY ROOM

## 2017-12-21 PROCEDURE — 93010 ELECTROCARDIOGRAM REPORT: CPT | Mod: ,,, | Performed by: INTERNAL MEDICINE

## 2017-12-21 PROCEDURE — 25000003 PHARM REV CODE 250: Performed by: EMERGENCY MEDICINE

## 2017-12-21 PROCEDURE — 99285 EMERGENCY DEPT VISIT HI MDM: CPT | Mod: 25

## 2017-12-21 PROCEDURE — 80053 COMPREHEN METABOLIC PANEL: CPT

## 2017-12-21 PROCEDURE — 93005 ELECTROCARDIOGRAM TRACING: CPT

## 2017-12-21 PROCEDURE — 84484 ASSAY OF TROPONIN QUANT: CPT

## 2017-12-21 PROCEDURE — 63600175 PHARM REV CODE 636 W HCPCS: Performed by: EMERGENCY MEDICINE

## 2017-12-21 PROCEDURE — 96360 HYDRATION IV INFUSION INIT: CPT

## 2017-12-21 PROCEDURE — 85025 COMPLETE CBC W/AUTO DIFF WBC: CPT

## 2017-12-21 PROCEDURE — 83735 ASSAY OF MAGNESIUM: CPT

## 2017-12-21 RX ORDER — NAPROXEN SODIUM 220 MG/1
162 TABLET, FILM COATED ORAL
Status: COMPLETED | OUTPATIENT
Start: 2017-12-21 | End: 2017-12-21

## 2017-12-21 RX ORDER — ACETAMINOPHEN 325 MG/1
650 TABLET ORAL EVERY 8 HOURS PRN
Status: DISCONTINUED | OUTPATIENT
Start: 2017-12-21 | End: 2017-12-23 | Stop reason: HOSPADM

## 2017-12-21 RX ORDER — POTASSIUM CHLORIDE 20 MEQ/1
20 TABLET, EXTENDED RELEASE ORAL 2 TIMES DAILY
Status: DISCONTINUED | OUTPATIENT
Start: 2017-12-21 | End: 2017-12-23 | Stop reason: HOSPADM

## 2017-12-21 RX ORDER — ASPIRIN 81 MG/1
81 TABLET ORAL DAILY
Status: DISCONTINUED | OUTPATIENT
Start: 2017-12-22 | End: 2017-12-23 | Stop reason: HOSPADM

## 2017-12-21 RX ORDER — POTASSIUM CHLORIDE 20 MEQ/15ML
40 SOLUTION ORAL
Status: COMPLETED | OUTPATIENT
Start: 2017-12-21 | End: 2017-12-21

## 2017-12-21 RX ORDER — MORPHINE SULFATE 10 MG/ML
2 INJECTION INTRAMUSCULAR; INTRAVENOUS; SUBCUTANEOUS EVERY 4 HOURS PRN
Status: DISCONTINUED | OUTPATIENT
Start: 2017-12-21 | End: 2017-12-22

## 2017-12-21 RX ORDER — CARVEDILOL 3.12 MG/1
3.12 TABLET ORAL 2 TIMES DAILY WITH MEALS
Status: DISCONTINUED | OUTPATIENT
Start: 2017-12-22 | End: 2017-12-22

## 2017-12-21 RX ORDER — AMIODARONE HYDROCHLORIDE 200 MG/1
200 TABLET ORAL 2 TIMES DAILY
Status: DISCONTINUED | OUTPATIENT
Start: 2017-12-21 | End: 2017-12-23 | Stop reason: HOSPADM

## 2017-12-21 RX ADMIN — MORPHINE SULFATE 2 MG: 10 INJECTION INTRAVENOUS at 11:12

## 2017-12-21 RX ADMIN — AMIODARONE HYDROCHLORIDE 200 MG: 200 TABLET ORAL at 11:12

## 2017-12-21 RX ADMIN — POTASSIUM CHLORIDE 20 MEQ: 1500 TABLET, EXTENDED RELEASE ORAL at 11:12

## 2017-12-21 RX ADMIN — ASPIRIN 81 MG 162 MG: 81 TABLET ORAL at 07:12

## 2017-12-21 RX ADMIN — POTASSIUM CHLORIDE 40 MEQ: 20 SOLUTION ORAL at 08:12

## 2017-12-21 RX ADMIN — SODIUM CHLORIDE 500 ML: 900 INJECTION, SOLUTION INTRAVENOUS at 07:12

## 2017-12-21 NOTE — ED TRIAGE NOTES
51 yo male comes in with the cc of feeling drained. Pt states he had  sz last night witnessed by his wife. Pt states he has felt like in a fog since. No complaints of pain, dominique or n/v

## 2017-12-22 PROBLEM — R79.89 ELEVATED TROPONIN: Status: ACTIVE | Noted: 2017-12-22

## 2017-12-22 LAB
ANION GAP SERPL CALC-SCNC: 11 MMOL/L
BNP SERPL-MCNC: 1170 PG/ML
BUN SERPL-MCNC: 38 MG/DL
CALCIUM SERPL-MCNC: 9.8 MG/DL
CHLORIDE SERPL-SCNC: 96 MMOL/L
CO2 SERPL-SCNC: 29 MMOL/L
CREAT SERPL-MCNC: 2 MG/DL
EST. GFR  (AFRICAN AMERICAN): 43 ML/MIN/1.73 M^2
EST. GFR  (NON AFRICAN AMERICAN): 38 ML/MIN/1.73 M^2
GLUCOSE SERPL-MCNC: 124 MG/DL
MAGNESIUM SERPL-MCNC: 2.5 MG/DL
POTASSIUM SERPL-SCNC: 3.2 MMOL/L
SODIUM SERPL-SCNC: 136 MMOL/L
T3FREE SERPL-MCNC: 2.2 PG/ML
T4 FREE SERPL-MCNC: 1.19 NG/DL
TROPONIN I SERPL DL<=0.01 NG/ML-MCNC: 0.05 NG/ML
TROPONIN I SERPL DL<=0.01 NG/ML-MCNC: 0.06 NG/ML
TSH SERPL DL<=0.005 MIU/L-ACNC: 4.29 UIU/ML

## 2017-12-22 PROCEDURE — 36415 COLL VENOUS BLD VENIPUNCTURE: CPT

## 2017-12-22 PROCEDURE — 63600175 PHARM REV CODE 636 W HCPCS: Performed by: INTERNAL MEDICINE

## 2017-12-22 PROCEDURE — G0378 HOSPITAL OBSERVATION PER HR: HCPCS

## 2017-12-22 PROCEDURE — 83880 ASSAY OF NATRIURETIC PEPTIDE: CPT

## 2017-12-22 PROCEDURE — 84439 ASSAY OF FREE THYROXINE: CPT

## 2017-12-22 PROCEDURE — 25000003 PHARM REV CODE 250: Performed by: HOSPITALIST

## 2017-12-22 PROCEDURE — 25000003 PHARM REV CODE 250: Performed by: EMERGENCY MEDICINE

## 2017-12-22 PROCEDURE — 84443 ASSAY THYROID STIM HORMONE: CPT

## 2017-12-22 PROCEDURE — 83735 ASSAY OF MAGNESIUM: CPT

## 2017-12-22 PROCEDURE — 80048 BASIC METABOLIC PNL TOTAL CA: CPT

## 2017-12-22 PROCEDURE — 84484 ASSAY OF TROPONIN QUANT: CPT

## 2017-12-22 PROCEDURE — 99233 SBSQ HOSP IP/OBS HIGH 50: CPT | Mod: ,,, | Performed by: INTERNAL MEDICINE

## 2017-12-22 PROCEDURE — 84481 FREE ASSAY (FT-3): CPT

## 2017-12-22 RX ORDER — HYDRALAZINE HYDROCHLORIDE 20 MG/ML
10 INJECTION INTRAMUSCULAR; INTRAVENOUS EVERY 6 HOURS PRN
Status: DISCONTINUED | OUTPATIENT
Start: 2017-12-22 | End: 2017-12-23 | Stop reason: HOSPADM

## 2017-12-22 RX ORDER — FUROSEMIDE 10 MG/ML
80 INJECTION INTRAMUSCULAR; INTRAVENOUS 2 TIMES DAILY
Status: DISCONTINUED | OUTPATIENT
Start: 2017-12-22 | End: 2017-12-23

## 2017-12-22 RX ORDER — METOLAZONE 2.5 MG/1
2.5 TABLET ORAL EVERY OTHER DAY
Status: DISCONTINUED | OUTPATIENT
Start: 2017-12-22 | End: 2017-12-23

## 2017-12-22 RX ORDER — CARVEDILOL 3.12 MG/1
3.12 TABLET ORAL 2 TIMES DAILY WITH MEALS
Status: DISCONTINUED | OUTPATIENT
Start: 2017-12-22 | End: 2017-12-23 | Stop reason: HOSPADM

## 2017-12-22 RX ORDER — HYDRALAZINE HYDROCHLORIDE 25 MG/1
25 TABLET, FILM COATED ORAL EVERY 12 HOURS
Status: DISCONTINUED | OUTPATIENT
Start: 2017-12-22 | End: 2017-12-22

## 2017-12-22 RX ORDER — METOPROLOL TARTRATE 1 MG/ML
5 INJECTION, SOLUTION INTRAVENOUS EVERY 5 MIN PRN
Status: DISCONTINUED | OUTPATIENT
Start: 2017-12-22 | End: 2017-12-23 | Stop reason: HOSPADM

## 2017-12-22 RX ORDER — FUROSEMIDE 20 MG/1
20 TABLET ORAL 2 TIMES DAILY
Status: DISCONTINUED | OUTPATIENT
Start: 2017-12-22 | End: 2017-12-22

## 2017-12-22 RX ADMIN — AMIODARONE HYDROCHLORIDE 200 MG: 200 TABLET ORAL at 09:12

## 2017-12-22 RX ADMIN — FUROSEMIDE 80 MG: 10 INJECTION, SOLUTION INTRAMUSCULAR; INTRAVENOUS at 05:12

## 2017-12-22 RX ADMIN — CARVEDILOL 3.12 MG: 3.12 TABLET, FILM COATED ORAL at 09:12

## 2017-12-22 RX ADMIN — POTASSIUM CHLORIDE 20 MEQ: 1500 TABLET, EXTENDED RELEASE ORAL at 08:12

## 2017-12-22 RX ADMIN — AMIODARONE HYDROCHLORIDE 200 MG: 200 TABLET ORAL at 08:12

## 2017-12-22 RX ADMIN — CARVEDILOL 3.12 MG: 3.12 TABLET, FILM COATED ORAL at 05:12

## 2017-12-22 RX ADMIN — FUROSEMIDE 80 MG: 10 INJECTION, SOLUTION INTRAMUSCULAR; INTRAVENOUS at 09:12

## 2017-12-22 RX ADMIN — ASPIRIN 81 MG: 81 TABLET, COATED ORAL at 09:12

## 2017-12-22 RX ADMIN — POTASSIUM CHLORIDE 20 MEQ: 1500 TABLET, EXTENDED RELEASE ORAL at 09:12

## 2017-12-22 RX ADMIN — FUROSEMIDE 20 MG: 20 TABLET ORAL at 09:12

## 2017-12-22 RX ADMIN — METOLAZONE 2.5 MG: 2.5 TABLET ORAL at 09:12

## 2017-12-22 NOTE — ASSESSMENT & PLAN NOTE
· Goal while inpatient is a systolic blood pressure less than 160mmHg  · BP in acceptable range at this time  · Continue current home regimen with hold parameters  · PRN antihypertensives available

## 2017-12-22 NOTE — PROGRESS NOTES
Follow-up Information     Ja Méndez MD. Go on 1/11/2018.    Specialty:  Family Medicine  Why:  Outpatient Services, PCP Follow-up Appointment, Please arrive to clinic for 3:40PM  Contact information:  [address] [place] [facility]  [place] [address]  [phone]             Nader Chiu MD.    Specialties:  INTERVENTIONAL CARDIOLOGY, Cardiology  Why:  Outpatient Services, Cardiology Follow-up Appointment, Clinic will contact you directly with appointment date/time  Contact information:  [address]  [place] [address]  [phone]                   [facility]    WRITTEN HEALTHCARE AND DISCHARGE INFORMATION                            Help at Home           [phone]  After discharge for assistance [facility] On Call Nurse Care Line 24/7  Assistance    Things You are responsible For To Manage Your Care At Home:  1.    Getting your prescriptions filled   2.    Taking your medications as directed, DO NOT MISS ANY DOSES!  3.    Going to your follow-up doctor appointment. This is important because it  allow the doctor to monitor your progress and determine if  any changes need to made to your treatment plan.     Thank you for choosing [facility] for your care.  Please answer any calls you may receive from [facility] we want to continue to support you as you manage your healthcare needs. [facility] is happy to have the opportunity to serve you.     Sincerely,  Your [facility] Team,  Debbie Watts MARIA DEL CARMEN   II  [phone]

## 2017-12-22 NOTE — HOSPITAL COURSE
In the emergency department routine laboratory studies including EKG, chest x-ray, and cardiac enzymes were obtained.  There is evidence of mildly elevated troponin level which is consistent with previous findings.  Head CT was negative for acute findings.  There is concern the patient may be having a possible arrhythmia or seizures. He was admitted to telemetry and cardiology was consulted.   He was diuresed with IV lasix per cardiology. His AICD was found to have fired multiple times for VT. Syncope likely 2/2 low output state. Telemetry only notable for frequent PVCs. Continue BB and amiodarone. No ACEi/ARB 2/2 worsening renal function. He refuses BiDil because he continues to use Cialis. His home Lasix was increased to 80mg BID with increased potassium supplementation at discharge. He needs to follow up next week with BMP to check his renal function and potassium levels.   He needs to follow up with EP and heart failure/transplant at [facility]. He was counseled on alcohol and tobacco cessation. He is relatively young and in my opinion does not seem to fully grasp the gravity of his condition. Patient and significant other at bedside counseled.

## 2017-12-22 NOTE — SUBJECTIVE & OBJECTIVE
Past Medical History:   Diagnosis Date    CHF (congestive heart failure)     Elevated LFTs     HTN (hypertension)        Past Surgical History:   Procedure Laterality Date    CARDIAC CATHETERIZATION  Dec. 2012    CARDIAC DEFIBRILLATOR PLACEMENT Left        Review of patient's allergies indicates:   Allergen Reactions    Lisinopril Anaphylaxis       No current facility-administered medications on file prior to encounter.      Current Outpatient Prescriptions on File Prior to Encounter   Medication Sig    allopurinol (ZYLOPRIM) 100 MG tablet TAKE 1 TABLET (100 MG TOTAL) BY MOUTH 3 (THREE) TIMES DAILY.    amiodarone (PACERONE) 200 MG Tab TAKE 1 TABLET (200 MG TOTAL) BY MOUTH ONCE DAILY.    aspirin (ECOTRIN) 81 MG EC tablet Take 81 mg by mouth. 1 Tablet, Delayed Release (E.C.) Oral Every day    carvedilol (COREG) 3.125 MG tablet Take 1 tablet (3.125 mg total) by mouth 2 (two) times daily with meals.    cyanocobalamin, vitamin B-12, (VITAMIN B-12) 50 mcg tablet Take 50 mcg by mouth once daily.    ERGOCALCIFEROL, VITAMIN D2, (VITAMIN D ORAL) Take by mouth once daily.    furosemide (LASIX) 40 MG tablet TAKE 1 TABLET BY MOUTH TWICE A DAY AS NEEDED.INCREASE TO 2TABLETS TWICE A DAY FOR ACUTE WEIGHT GAIN    losartan (COZAAR) 25 MG tablet TAKE 1 TABLET BY MOUTH ONCE DAILY    metOLazone (ZAROXOLYN) 2.5 MG tablet TAKE 1 TABLET BY MOUTH EVERY OTHER DAY    potassium chloride SA (K-DUR,KLOR-CON) 20 MEQ tablet TAKE 1 TABLET (20 MEQ TOTAL) BY MOUTH 2 (TWO) TIMES DAILY.    VITAMIN E ACETATE (VITAMIN E ORAL) Take by mouth once daily.    hydrALAZINE (APRESOLINE) 25 MG tablet TAKE 1 TABLET BY MOUTH EVERY 12 HOURS.     Family History     Problem Relation (Age of Onset)    Coronary artery disease Father    Diabetes Father    Hypertension Father    No Known Problems Mother        Social History Main Topics    Smoking status: Current Every Day Smoker     Packs/day: 1.00     Years: 31.00     Types: Cigarettes    Smokeless  tobacco: Never Used      Comment: pt is quiting on his own - pt stated not qualified for program; 2/16 pt  quit on his own    Alcohol use 0.0 oz/week      Comment: 2 glasses wine daily    Drug use: No    Sexual activity: Yes     Partners: Female     Birth control/ protection: None      Comment: 10/5/17  with same partner 7 years      Review of Systems   Constitution: Negative.   HENT: Negative.    Eyes: Negative.    Cardiovascular: Positive for dyspnea on exertion and leg swelling. Negative for chest pain, irregular heartbeat, near-syncope, orthopnea, palpitations, paroxysmal nocturnal dyspnea and syncope.   Respiratory: Positive for shortness of breath.    Skin: Negative.    Musculoskeletal: Negative.    Gastrointestinal: Negative for abdominal pain, constipation and diarrhea.   Genitourinary: Negative for dysuria.   Neurological: Negative for dizziness.   Psychiatric/Behavioral: Negative.      Objective:     Vital Signs (Most Recent):  Temp: 97.3 °F (36.3 °C) (12/22/17 0521)  Pulse: 77 (12/22/17 0521)  Resp: 18 (12/22/17 0521)  BP: 110/88 (12/22/17 0911)  SpO2: 97 % (12/21/17 2226) Vital Signs (24h Range):  Temp:  [97.3 °F (36.3 °C)-98.7 °F (37.1 °C)] 97.3 °F (36.3 °C)  Pulse:  [68-83] 77  Resp:  [17-19] 18  SpO2:  [97 %-98 %] 97 %  BP: ()/(58-89) 110/88     Weight: 119.5 kg (263 lb 7.2 oz)  Body mass index is 34.76 kg/m².    SpO2: 97 %  O2 Device (Oxygen Therapy): room air      Intake/Output Summary (Last 24 hours) at 12/22/17 1026  Last data filed at 12/22/17 0446   Gross per 24 hour   Intake             1500 ml   Output                0 ml   Net             1500 ml       Lines/Drains/Airways     Peripheral Intravenous Line                 Peripheral IV - Single Lumen 12/21/17 1810 Left Antecubital less than 1 day                Physical Exam   Constitutional: He is oriented to person, place, and time. He appears well-developed and well-nourished. No distress.   HENT:   Head: Normocephalic and  atraumatic.   Eyes: Conjunctivae and EOM are normal. Pupils are equal, round, and reactive to light.   Neck: Normal range of motion. Neck supple. No thyromegaly present.   Cardiovascular: Normal rate, regular rhythm and normal heart sounds.    No murmur heard.  Pulmonary/Chest: Effort normal and breath sounds normal. No respiratory distress. He has no wheezes. He has no rales. He exhibits no tenderness.   Abdominal: Soft. Bowel sounds are normal.   Musculoskeletal: He exhibits no edema.   Neurological: He is alert and oriented to person, place, and time.   Skin: Skin is warm and dry.   Psychiatric: He has a normal mood and affect. His behavior is normal.       Significant Labs:   CMP   Recent Labs  Lab 12/21/17 1810 12/22/17  0648    136   K 3.3* 3.2*   CL 93* 96   CO2 30* 29   GLU 89 124*   BUN 41* 38*   CREATININE 2.1* 2.0*   CALCIUM 10.3 9.8   PROT 7.8  --    ALBUMIN 3.9  --    BILITOT 0.8  --    ALKPHOS 74  --    AST 27  --    ALT 22  --    ANIONGAP 15 11   ESTGFRAFRICA 41* 43*   EGFRNONAA 35* 38*   , CBC   Recent Labs  Lab 12/21/17 1810   WBC 6.24   HGB 14.5   HCT 44.9      , INR No results for input(s): INR, PROTIME in the last 48 hours., Lipid Panel No results for input(s): CHOL, HDL, LDLCALC, TRIG, CHOLHDL in the last 48 hours. and Troponin   Recent Labs  Lab 12/21/17 1810 12/22/17  0648   TROPONINI 0.090* 0.050*       Significant Imaging: Echocardiogram:   2D echo with color flow doppler:   Results for orders placed or performed during the hospital encounter of 10/13/17   2D echo with color flow doppler   Result Value Ref Range    EF 10 (A) 55 - 65    Mitral Valve Regurgitation MODERATE (A)     Diastolic Dysfunction Yes (A)     Est. PA Systolic Pressure 34.89     Mitral Valve Mobility NORMAL     Tricuspid Valve Regurgitation TRIVIAL

## 2017-12-22 NOTE — CONSULTS
[facility]  Cardiology  Consult Note    Patient Name: Tim Richards  MRN: [id number]  Admission Date: 12/21/2017  Hospital Length of Stay: 1 days  Code Status: Full Code   Attending Provider: Stephania Craig MD   Consulting Provider: Nader Vasquez MD  Primary Care Physician: Ja Méndez MD  Principal Problem:Syncope    Patient information was obtained from patient, past medical records and ER records.     Inpatient consult to Cardiology  Consult performed by: NADER VASQUEZ  Consult ordered by: MARLENA TOMAS  Reason for consult: CHF        Subjective:     Chief Complaint:  Dizzy     HPI:   52 y.o. Male with CHF, elevated LFTs and HTN presents to the ED because of concern for seizure last night, weakness, SOB, mild palpitations, and dizziness began last night. The patient reports he felt weak at work today. He states that he felt himself blacking out before his seizure occurred last night which concerns him the most. He has a cardiac disease and dysrhythmias. The patient recently switched from lisinopril to another BP medication because he has an allergy to it. He complies with BP and CHF medications. He denies chest pain. No other symptoms or alleviating factors present.    Patient is known to me from clinic.  He has a history of severe nonischemic cardiomyopathy with previous biventricular ICD placement.  He has a history of alcohol and tobacco abuse and noncompliance with medicines in the past.  He says been compliant with his medicines but not with his diet recently.  He developed some worsening symptoms over the past several days through the holidays.  He denies any current chest pain or shortness of breath.  He's had no PND, orthopnea or lower edema.  He denies any dizziness, presyncope or syncope.    Past Medical History:   Diagnosis Date    CHF (congestive heart failure)     Elevated LFTs     HTN (hypertension)        Past Surgical History:   Procedure  Laterality Date    CARDIAC CATHETERIZATION  Dec. 2012    CARDIAC DEFIBRILLATOR PLACEMENT Left        Review of patient's allergies indicates:   Allergen Reactions    Lisinopril Anaphylaxis       No current facility-administered medications on file prior to encounter.      Current Outpatient Prescriptions on File Prior to Encounter   Medication Sig    allopurinol (ZYLOPRIM) 100 MG tablet TAKE 1 TABLET (100 MG TOTAL) BY MOUTH 3 (THREE) TIMES DAILY.    amiodarone (PACERONE) 200 MG Tab TAKE 1 TABLET (200 MG TOTAL) BY MOUTH ONCE DAILY.    aspirin (ECOTRIN) 81 MG EC tablet Take 81 mg by mouth. 1 Tablet, Delayed Release (E.C.) Oral Every day    carvedilol (COREG) 3.125 MG tablet Take 1 tablet (3.125 mg total) by mouth 2 (two) times daily with meals.    cyanocobalamin, vitamin B-12, (VITAMIN B-12) 50 mcg tablet Take 50 mcg by mouth once daily.    ERGOCALCIFEROL, VITAMIN D2, (VITAMIN D ORAL) Take by mouth once daily.    furosemide (LASIX) 40 MG tablet TAKE 1 TABLET BY MOUTH TWICE A DAY AS NEEDED.INCREASE TO 2TABLETS TWICE A DAY FOR ACUTE WEIGHT GAIN    losartan (COZAAR) 25 MG tablet TAKE 1 TABLET BY MOUTH ONCE DAILY    metOLazone (ZAROXOLYN) 2.5 MG tablet TAKE 1 TABLET BY MOUTH EVERY OTHER DAY    potassium chloride SA (K-DUR,KLOR-CON) 20 MEQ tablet TAKE 1 TABLET (20 MEQ TOTAL) BY MOUTH 2 (TWO) TIMES DAILY.    VITAMIN E ACETATE (VITAMIN E ORAL) Take by mouth once daily.    hydrALAZINE (APRESOLINE) 25 MG tablet TAKE 1 TABLET BY MOUTH EVERY 12 HOURS.     Family History     Problem Relation (Age of Onset)    Coronary artery disease Father    Diabetes Father    Hypertension Father    No Known Problems Mother        Social History Main Topics    Smoking status: Current Every Day Smoker     Packs/day: 1.00     Years: 31.00     Types: Cigarettes    Smokeless tobacco: Never Used      Comment: pt is quiting on his own - pt stated not qualified for program; 2/16 pt  quit on his own    Alcohol use 0.0 oz/week       Comment: 2 glasses wine daily    Drug use: No    Sexual activity: Yes     Partners: Female     Birth control/ protection: None      Comment: 10/5/17  with same partner 7 years      Review of Systems   Constitution: Negative.   HENT: Negative.    Eyes: Negative.    Cardiovascular: Positive for dyspnea on exertion and leg swelling. Negative for chest pain, irregular heartbeat, near-syncope, orthopnea, palpitations, paroxysmal nocturnal dyspnea and syncope.   Respiratory: Positive for shortness of breath.    Skin: Negative.    Musculoskeletal: Negative.    Gastrointestinal: Negative for abdominal pain, constipation and diarrhea.   Genitourinary: Negative for dysuria.   Neurological: Negative for dizziness.   Psychiatric/Behavioral: Negative.      Objective:     Vital Signs (Most Recent):  Temp: 97.3 °F (36.3 °C) (12/22/17 0521)  Pulse: 77 (12/22/17 0521)  Resp: 18 (12/22/17 0521)  BP: 110/88 (12/22/17 0911)  SpO2: 97 % (12/21/17 2226) Vital Signs (24h Range):  Temp:  [97.3 °F (36.3 °C)-98.7 °F (37.1 °C)] 97.3 °F (36.3 °C)  Pulse:  [68-83] 77  Resp:  [17-19] 18  SpO2:  [97 %-98 %] 97 %  BP: ()/(58-89) 110/88     Weight: 119.5 kg (263 lb 7.2 oz)  Body mass index is 34.76 kg/m².    SpO2: 97 %  O2 Device (Oxygen Therapy): room air      Intake/Output Summary (Last 24 hours) at 12/22/17 1026  Last data filed at 12/22/17 0446   Gross per 24 hour   Intake             1500 ml   Output                0 ml   Net             1500 ml       Lines/Drains/Airways     Peripheral Intravenous Line                 Peripheral IV - Single Lumen 12/21/17 1810 Left Antecubital less than 1 day                Physical Exam   Constitutional: He is oriented to person, place, and time. He appears well-developed and well-nourished. No distress.   HENT:   Head: Normocephalic and atraumatic.   Eyes: Conjunctivae and EOM are normal. Pupils are equal, round, and reactive to light.   Neck: Normal range of motion. Neck supple. No  thyromegaly present.   Cardiovascular: Normal rate, regular rhythm and normal heart sounds.    No murmur heard.  Pulmonary/Chest: Effort normal and breath sounds normal. No respiratory distress. He has no wheezes. He has no rales. He exhibits no tenderness.   Abdominal: Soft. Bowel sounds are normal.   Musculoskeletal: He exhibits no edema.   Neurological: He is alert and oriented to person, place, and time.   Skin: Skin is warm and dry.   Psychiatric: He has a normal mood and affect. His behavior is normal.       Significant Labs:   CMP   Recent Labs  Lab 12/21/17 1810 12/22/17  0648    136   K 3.3* 3.2*   CL 93* 96   CO2 30* 29   GLU 89 124*   BUN 41* 38*   CREATININE 2.1* 2.0*   CALCIUM 10.3 9.8   PROT 7.8  --    ALBUMIN 3.9  --    BILITOT 0.8  --    ALKPHOS 74  --    AST 27  --    ALT 22  --    ANIONGAP 15 11   ESTGFRAFRICA 41* 43*   EGFRNONAA 35* 38*   , CBC   Recent Labs  Lab 12/21/17  1810   WBC 6.24   HGB 14.5   HCT 44.9      , INR No results for input(s): INR, PROTIME in the last 48 hours., Lipid Panel No results for input(s): CHOL, HDL, LDLCALC, TRIG, CHOLHDL in the last 48 hours. and Troponin   Recent Labs  Lab 12/21/17  1810 12/22/17  0648   TROPONINI 0.090* 0.050*       Significant Imaging: Echocardiogram:   2D echo with color flow doppler:   Results for orders placed or performed during the hospital encounter of 10/13/17   2D echo with color flow doppler   Result Value Ref Range    EF 10 (A) 55 - 65    Mitral Valve Regurgitation MODERATE (A)     Diastolic Dysfunction Yes (A)     Est. PA Systolic Pressure 34.89     Mitral Valve Mobility NORMAL     Tricuspid Valve Regurgitation TRIVIAL      Assessment and Plan:     * Syncope    Likely secondary to underlying low output state        Acute combined systolic and diastolic heart failure    Diuresis as tolerated  Check BNP  Continue beta blocker  Off ace/ARB secondary to serum creatinine  Previously refused BiDil secondary to Cialis usage  Will  need outpatient referral to heart failure/transplant        Hypertension, well controlled             Alcohol abuse    Counseled        Cigarette nicotine dependence without complication    Counseled        Biventricular implantable cardioverter-defibrillator in situ                 VTE Risk Mitigation         Ordered     Medium Risk of VTE  Once      12/21/17 2237     Place DURNA hose  Until discontinued      12/21/17 2237     Place sequential compression device  Until discontinued      12/21/17 2237          Thank you for your consult. I will follow-up with patient. Please contact us if you have any additional questions.    Nader Chiu MD  Cardiology   [facility]

## 2017-12-22 NOTE — HPI
52 y.o. Male with CHF, elevated LFTs and HTN presents to the ED because of concern for seizure last night, weakness, SOB, mild palpitations, and dizziness began last night. The patient reports he felt weak at work today. He states that he felt himself blacking out before his seizure occurred last night which concerns him the most. He has a cardiac disease and dysrhythmias. The patient recently switched from lisinopril to another BP medication because he has an allergy to it. He complies with BP and CHF medications. He denies chest pain. No other symptoms or alleviating factors present.    Patient is known to me from clinic.  He has a history of severe nonischemic cardiomyopathy with previous biventricular ICD placement.  He has a history of alcohol and tobacco abuse and noncompliance with medicines in the past.  He says been compliant with his medicines but not with his diet recently.  He developed some worsening symptoms over the past several days through the holidays.  He denies any current chest pain or shortness of breath.  He's had no PND, orthopnea or lower edema.  He denies any dizziness, presyncope or syncope.

## 2017-12-22 NOTE — NURSING
Spoke with [employer] representative and ETA is after 5pm today. EMILY Wagner charge nurse notified.

## 2017-12-22 NOTE — ASSESSMENT & PLAN NOTE
· Chronic tobacco use  · Tobacco cessation counseling  · Nicotine patch offered; consider Wellbutrin or Chantix through PCP as an outpatient (will require closer monitoring)  · I discussed with the patient regarding the hazardous effects of smoking on increasing risk of heart attack and stroke, worsening lung functions, and increasing cancer risk.   Patient was urged to stop smoking now.  I also offered nicotine taper (such as nicotine patch and gum) to help ease the craving to smoke.

## 2017-12-22 NOTE — HPI
Tim Richards is a 53 yo man with CHF (Echo 10/2017 EF 10%, G3DD), BiV [employer] AICD, essential HTN, tobacco abuse, obesity, and gout who presented for weakness and fatigue.  He reports  3 episodes in which he felt like he was going to pass out.  He thinks he may have had a seizure sometime during the night.  He has a history of cardiac disease and dysrhythmia as well as an ejection fraction of 10% with his most recent echocardiogram in October. He reports compliance with his home medication regimen and states he recently stopped lisinopril due to an allergy.  He does however admit that he has been less compliant with his diet due to the holidays and has increased his diuretic recently. Otherwise he denies chest pain. He has associated dyspnea with exertion as well as shortness of breath at rest.  He also complains of unintentional weight gain and trace peripheral edema.

## 2017-12-22 NOTE — PROVIDER TRANSFER
Tim Richards is a 53 yo man with CHF (Echo 10/2017 EF 10%, G3DD), BiV [employer] AICD, essential HTN, tobacco abuse, obesity, and gout who presented for weakness and fatigue.  He reports  3 episodes in which he felt like he was going to pass out.  He thinks he may have had a seizure sometime during the night.  He has a history of cardiac disease and dysrhythmia as well as an ejection fraction of 10% with his most recent echocardiogram in October. He reports compliance with his home medication regimen and states he recently stopped lisinopril due to an allergy.  He does however admit that he has been less compliant with his diet due to the holidays and has increased his diuretic recently. Otherwise he denies chest pain. He has associated dyspnea with exertion as well as shortness of breath at rest.  He also complains of unintentional weight gain and trace peripheral edema.     In the emergency department routine laboratory studies including EKG, chest x-ray, and cardiac enzymes were obtained.  There is evidence of mildly elevated troponin level which is consistent with previous findings.  Head CT was negative for acute findings.  There is concern the patient may be having a possible arrhythmia or seizures. He was admitted to telemetry and cardiology was consulted.

## 2017-12-22 NOTE — PLAN OF CARE
Problem: Fall Risk (Adult)  Intervention: Monitor/Assist with Self Care   17   Functional Level Current   Ambulation 0 - independent --    Transferring 0 - independent --    Toileting 0 - independent --    Bathing 0 - independent --    Dressing 0 - independent --    Eating 0 - independent --    Communication 0 - understands/communicates without difficulty --    Swallowing 0 - swallows foods/liquids without difficulty --    Daily Care Interventions   Self-Care Promotion independence encouraged;BADL personal objects within reach --    Activity   Activity Assistance Provided --  independent     Intervention: Reduce Risk/Promote Restraint Free Environment   17   Safety Interventions   Environmental Safety Modification assistive device/personal items within reach;clutter free environment maintained;lighting adjusted;room near unit station;room organization consistent   Prevent  Drop/Fall   Safety/Security Measures bed alarm set     Intervention: Review Medications/Identify Contributors to Fall Risk   17   Safety Interventions   Medication Review/Management medications reviewed;high risk medications identified     Intervention: Patient Rounds   17   Safety Interventions   Patient Rounds bed in low position;bed wheels locked;call light in reach;clutter free environment maintained;ID band on;placement of personal items at bedside;toileting offered;visualized patient     Intervention: Safety Promotion/Fall Prevention   17   Safety Interventions   Safety Promotion/Fall Prevention bed alarm set;commode/urinal/bedpan at bedside;nonskid shoes/socks when out of bed;room near unit station;side rails raised x 2;instructed to call staff for mobility       Goal: Identify Related Risk Factors and Signs and Symptoms  Related risk factors and signs and symptoms are identified upon initiation of Human Response Clinical Practice Guideline (CPG)   Outcome: Ongoing  (interventions implemented as appropriate)   12/22/17 0044   Fall Risk   Related Risk Factors (Fall Risk) history of falls;impaired vision;environment unfamiliar   Signs and Symptoms (Fall Risk) presence of risk factors     Goal: Absence of Falls  Patient will demonstrate the desired outcomes by discharge/transition of care.    12/22/17 0044   Fall Risk (Adult)   Absence of Falls making progress toward outcome

## 2017-12-22 NOTE — ASSESSMENT & PLAN NOTE
Syncope and collapse with possible seizure activity  · Unclear exact etiology of his condition  · Differential includes: Arrhythmia, carotid insufficiency, orthostatic hypotension, CVA/TIA,  Seizure, somatization disorder, anxiety, hypoglycemia, CNS disorder, medication side effect  · Monitor with telemetry  · CT of head negative. Obtain MRI of brain if cardiac workup negative  · Carotid ultrasound  · Monitor blood pressure closely including orthostatics  · Monitor Accu-Cheks as outlined below   · Neurology consult   · Consider EEG if the above is negative

## 2017-12-22 NOTE — ASSESSMENT & PLAN NOTE
Diuresis as tolerated  Check BNP  Continue beta blocker  Off ace/ARB secondary to serum creatinine  Previously refused BiDil secondary to Cialis usage  Will need outpatient referral to heart failure/transplant

## 2017-12-22 NOTE — ASSESSMENT & PLAN NOTE
· Evidenced by history,   · No evidence of pulmonary edema, peripheral edema  · BNP can be falsely low in morbidly obese  · Provide diuresis w/ by mouth medication  · Maintain w/ beta-blocker  · ALLERGIC to ACE inhibitor   · Daily Weights  · Strict I/O  · Low-sodium cardiac diet  · Chest X-ray shows evidence of pulmonary edema and cardiomegaly  · Check TSH, albumin, UA, and renal function  · Obtain 2D echo if <6 months     EF   Date Value Ref Range Status   10/13/2017 10 (A) 55 - 65    08/10/2016 10 (A) 55 - 65      · EKG and cardiac enzymes PRN  · DVT prophylaxis w/ pharmacological and/or mechanical measures  · Oxygen supplementation support PRN    Instructions given to patient/family:  Monitor daily weight.  Regular activity within patient's limitations.  Low salt, low fat and low choleterol diet and restrict fluid < 2L per day.  Call MD if SOB, chest pain, weight gain > 2-3 lbs per day and/or 5-6 lbs per week.   No smoking. Annual influenza vaccine required.

## 2017-12-22 NOTE — NURSING
Patient AAOx4 - calm, cooperative, wife at the bedside, patient placed on telemetry - no O2 required, saturation stable, Glasses on, 2G to L AC - no acute distress, no complaints or needs at this time.

## 2017-12-22 NOTE — PLAN OF CARE
"MARIA DEL CARMEN met with patient to provide and review discharge follow-up instructions. EDUCATION: Patient provided with educational information on Syncope.  Information reviewed and placed in "My Healthcare Packet" to be brought home for patient to use as resource after discharge.  Information included:  signs and symptoms to look for and when to call the doctor if experiencing any symptoms that may indicate a medical emergency: CALL 911.   Reminded patient things he will be responsible for to manage his healthcare at home: getting Rx filled, attending follow up appointments, and taking medication as prescribed were discussed.  Teach back method used.  All questions answered.  Patient verbalized understanding of all information.  MARIA DEL CARMEN informed nurse Susanne  completed all discharge planning for patient when officially discharged by attending MD.         12/22/17 2980   Final Note   Assessment Type Final Discharge Note   Discharge Disposition Home   Hospital Follow Up  Appt(s) scheduled? Yes   Discharge plans and expectations educations in teach back method with documentation complete? Yes   Right Care Referral Info   Post Acute Recommendation No Care         "

## 2017-12-22 NOTE — ASSESSMENT & PLAN NOTE
Patient appears to have an elevated troponin at baseline.  This is near his usual level and consistent with CAD and CHF.  There are no EKG findings consistent with acute ischemia.  However we will continue to trend troponin level and monitor on telemetry.

## 2017-12-22 NOTE — ED PROVIDER NOTES
"Encounter Date: 12/21/2017    SCRIBE #1 NOTE: I, Shamir Yuen, am scribing for, and in the presence of,  Lory Mcallister MD. I have scribed the following portions of the note - Other sections scribed: HPI, ROS.       History     Chief Complaint   Patient presents with    Fatigue     Weakness and 3 episodes of dizziness today.  "I think I had a seizure yesterday, and I feel like I may have one again today."     CC: Fatigue    HPI: This 52 y.o. Male with CHF, elevated LFTs and HTN presents to the ED because of concern for seizure last night, weakness, SOB, mild palpitations, and dizziness began last night. The patient reports he felt weak at work today. He states that he felt himself blacking out before his seizure occurred last night which concerns him the most. He has a cardiac disease and dysrhythmias. The patient recently switched from lisinopril to another BP medication because he has an allergy to it. He complies with BP and CHF medications. He denies chest pain. No other symptoms or alleviating factors present.    PCP: Dr. Ja Méndez      The history is provided by the patient. No  was used.     Review of patient's allergies indicates:   Allergen Reactions    Lisinopril Anaphylaxis     Past Medical History:   Diagnosis Date    CHF (congestive heart failure)     Elevated LFTs     HTN (hypertension)      Past Surgical History:   Procedure Laterality Date    CARDIAC CATHETERIZATION  Dec. 2012    CARDIAC DEFIBRILLATOR PLACEMENT Left      Family History   Problem Relation Age of Onset    Hypertension Father     Diabetes Father     Coronary artery disease Father     No Known Problems Mother      Social History   Substance Use Topics    Smoking status: Current Every Day Smoker     Packs/day: 1.00     Years: 31.00     Types: Cigarettes    Smokeless tobacco: Never Used      Comment: pt is quiting on his own - pt stated not qualified for program; 2/16 pt  quit on his own    " Alcohol use 0.0 oz/week      Comment: 2 glasses wine daily     Review of Systems   Constitutional: Negative for chills and fever.   HENT: Negative for rhinorrhea and sore throat.    Respiratory: Positive for shortness of breath and wheezing.    Cardiovascular: Positive for palpitations (mild). Negative for chest pain.   Gastrointestinal: Negative for nausea.   Genitourinary: Negative for dysuria.   Musculoskeletal: Negative for back pain.   Skin: Negative for rash.   Neurological: Positive for dizziness, seizures and weakness.   Hematological: Does not bruise/bleed easily.       Physical Exam     Initial Vitals [12/21/17 1603]   BP Pulse Resp Temp SpO2   133/89 83 17 98.6 °F (37 °C) 97 %      MAP       103.67         Physical Exam    Nursing note and vitals reviewed.  Constitutional: He appears well-developed and well-nourished.  Non-toxic appearance. He does not appear ill. No distress.   The patient is awake.   HENT:   Head: Normocephalic and atraumatic.   He has dry mucous membranes.   Eyes: Conjunctivae and EOM are normal. Pupils are equal, round, and reactive to light.   Neck: Normal range of motion. Neck supple. No JVD present.   Cardiovascular: Normal rate and normal heart sounds.   Pulmonary/Chest: Effort normal and breath sounds normal. No respiratory distress. He has no wheezes.   Abdominal: Soft. Normal appearance and bowel sounds are normal. There is no tenderness.   Musculoskeletal: Normal range of motion. He exhibits edema. He exhibits no tenderness.   Trace symmetric LE non pitting edema   Neurological: He is alert and oriented to person, place, and time. He has normal strength. No cranial nerve deficit or sensory deficit.   Skin: Skin is warm and dry.   Psychiatric: He has a normal mood and affect. His behavior is normal. Thought content normal.         ED Course   Procedures  Labs Reviewed   COMPREHENSIVE METABOLIC PANEL - Abnormal; Notable for the following:        Result Value    Potassium 3.3 (*)  "    Chloride 93 (*)     CO2 30 (*)     BUN, Bld 41 (*)     Creatinine 2.1 (*)     eGFR if  41 (*)     eGFR if non  35 (*)     All other components within normal limits   CBC W/ AUTO DIFFERENTIAL - Abnormal; Notable for the following:     RDW 15.7 (*)     All other components within normal limits   TROPONIN I - Abnormal; Notable for the following:     Troponin I 0.090 (*)     All other components within normal limits   URINALYSIS   MAGNESIUM   URINALYSIS MICROSCOPIC     EKG Readings: (Independently Interpreted)   Previous EKG: Compared with most recent EKG   Paced ventricular rhythm w PVC present       X-Rays:   Independently Interpreted Readings:   Chest X-Ray: Cardiomegaly present.  Increased vascular markings consistent with CHF are present.     Medical Decision Making:   Initial Assessment:       Urgent evaluation a 51-year-old gentleman with severe CHF last EF ~10% w AICD in place (follows fabi Chiu)  here with concern for possible syncopal v seizure episode last night.  Patient endorses recent increase in Lasix intake secondary to holiday diet and perceived fluid overload.  Patient is currently not complaining of shortness of breath at this time, the last night was seated with his wife, when he experienced tunnel vision and reportedly "passed out onto the floor with convulsions". Pt has no hx of seizure do though 1 similar event in past reportedly secondary to hypokalemia. Pt returned to his baseline last night, and went to work but has felt weak and agreed to seek care today. Pt denies recent head trauma, no other medication changes. Ddx electrolyte metabolic disturbance, cardiac syncope, dehydration. Will obtain labs and reassess.         Clinical Tests:   Lab Tests: Ordered and Reviewed  Radiological Study: Ordered and Reviewed  Medical Tests: Ordered and Reviewed  ED Management:  Labs notable for mild hypokalemia, Cr at baseline 2, BUN 21, trop 0.09 (consistent with " priors)  +Orthostatic VS    Given hx of now 2nd episode syncope w questionable seizure activity, I am concerned about cardiac etiology and plan for admission with interrogation and eval by Cards, repeat troponin and telemetry. Pt not desiring for Head CT at this time and given no hx of head trauma and non focal neuro exam, will defer at this time.             Scribe Attestation:   Scribe #1: I performed the above scribed service and the documentation accurately describes the services I performed. I attest to the accuracy of the note.    Attending Attestation:           Physician Attestation for Scribe:  Physician Attestation Statement for Scribe #1: I, Lory Mcallister MD, reviewed documentation, as scribed by Shamir Yuen in my presence, and it is both accurate and complete.                 ED Course      Clinical Impression:   The primary encounter diagnosis was Syncope, unspecified syncope type. Diagnoses of Weakness, Palpitations, Convulsions, unspecified convulsion type, Orthostasis, and Renal insufficiency were also pertinent to this visit.    Disposition:   Disposition: Admitted  Condition: Fair                        Lory Mcallister MD  12/21/17 8384

## 2017-12-22 NOTE — PLAN OF CARE
"SW met with patient at bedside to complete discharge needs assessment. SW provided and reviewed with patient "[facility] Packet", "Discharge Planning Begins on Admission" brochure and "Help At Home" handout. MARIA DEL CARMEN discussed with patient the things he will be responsible for to manage his health at home. Patient prefers afternoon appointments.         [id number] [place] - [address]  [address] [place] [address]  Phone: [phone] Fax: [phone]           12/22/17 1610   Discharge Assessment   Assessment Type Discharge Planning Assessment   Confirmed/corrected address and phone number on facesheet? Yes   Assessment information obtained from? Patient   Communicated expected length of stay with patient/caregiver no   Prior to hospitilization cognitive status: Alert/Oriented;No Deficits   Prior to hospitalization functional status: Independent   Current cognitive status: Alert/Oriented;No Deficits   Current Functional Status: Independent   Facility Arrived From: Home   Lives With significant other   Able to Return to Prior Arrangements yes   Is patient able to care for self after discharge? Yes   Who are your caregiver(s) and their phone number(s)? Kelly (spouse) [phone]   Patient's perception of discharge disposition home or selfcare   Readmission Within The Last 30 Days no previous admission in last 30 days   Patient currently being followed by outpatient case management? No   Patient currently receives any other outside agency services? No   Equipment Currently Used at Home none   Do you have any problems affording any of your prescribed medications? No   Is the patient taking medications as prescribed? yes   Does the patient have transportation home? Yes   Transportation Available car;family or friend will provide   Does the patient receive services at the [facility]? No   Discharge Plan A Home with family  (PCP F/U)   Discharge Plan B Home with family   Patient/Family " In Agreement With Plan yes

## 2017-12-22 NOTE — H&P
"[facility] Hospital Medicine  History & Physical    Patient Name: Tim Richards  MRN: [id number]  Admission Date: 12/22/2017  Attending Physician: Raz Pulliam MD, MPH      PCP:     Ja Méndez MD    CC:     Chief Complaint   Patient presents with    Fatigue     Weakness and 3 episodes of dizziness today.  "I think I had a seizure yesterday, and I feel like I may have one again today."       HISTORY OF PRESENT ILLNESS:     Tim Richards is a 52 y.o. male that (in part)  has a past medical history of CHF (congestive heart failure); Elevated LFTs; and HTN (hypertension). Presents to [facility] Emergency Department complaining of weakness and fatigue.  He reports  3 episodes in which she felt like she was going to pass out.  He thinks he may have had a seizure sometime during the night.  He has a history of cardiac disease and dysrhythmia as well as an ejection fraction of 10% with his most recent echocardiogram in October.  He has an AICD in place.  He reports compliance with his home medication regimen and states he recently switched to lisinopril due to an ALLERGY.  He does however admit that he has been less compliant with his diet due to the holidays and has increased his diuretic recently.  Otherwise he denies chest pain.  Complains of associated symptoms of dyspnea with exertion as well as shortness of breath at rest.  Also complains of unintentional weight gain and trace peripheral edema.  Constant duration.  Subacute onset with progressive worsening.  Moderate in severity.  Recurrent episodes.    In the emergency department routine laboratory studies including EKG, chest x-ray, and cardiac enzymes were obtained.  There is evidence of mildly elevated troponin level which is consistent with previous findings.  Head CT was negative for acute findings.  There is concern the patient may be having a possible arrhythmia or seizures.    Hospital " medicine has been asked to admit for further evaluation and treatment.       REVIEW OF SYSTEMS:     -- Constitutional:  weakness fatigue and collapse.  No fever or chills.  -- Eyes: No visual changes, diplopia, pain, tearing, blind spots, or discharge.   -- Ears, nose, mouth, throat, and face: No congestion, sore throat, epistaxis, d/c, bleeding gums, neck stiffness masses, or dental issues.  -- Respiratory :  Occasional cough.  Shortness of breath. No cough, hemoptysis, stridor, wheezing, or night sweats.  -- Cardiovascular: syncope.  Shortness of breath at rest and dyspnea with exertion.  Unintentional weight gain.  Edema.  No chest pain, syncope, PND, cyanosis, or palpitations.   -- Gastrointestinal: No vomiting, abdominal pain, hematemesis, melena, dyspepsia, or change in bowel habits.  -- Genitourinary: No hematuria, dysuria, frequency, urgency, nocturia, polyuria, stones, or incontinence.  -- Integument/breast: No rash, pruritis, pigmentation changes, dryness, or changes in hair  -- Hematologic/lymphatic: Positive for easy bruising.   No lymphadenopathy.   -- Musculoskeletal:  No acute arthralgias, acute myalgias, joint swelling, acute limitations of ROM, or acute muscular weakness.  -- Neurological: As above in history of present illness.  -- Behavioral/Psych: No auditory or visual hallucinations, depression, or suicidal/homicidal ideations.  -- Endocrine: Unintentional weight gain. No heat or cold intolerance, polydipsia  -- Allergy/Immunologic: No recurrent infections or adverse reaction to food, insects, or difficulty breathing.      PAST MEDICAL / SURGICAL HISTORY:     Past Medical History:   Diagnosis Date    CHF (congestive heart failure)     Elevated LFTs     HTN (hypertension)      Past Surgical History:   Procedure Laterality Date    CARDIAC CATHETERIZATION  Dec. 2012    CARDIAC DEFIBRILLATOR PLACEMENT Left          FAMILY HISTORY:     Family History   Problem Relation Age of Onset     Hypertension Father     Diabetes Father     Coronary artery disease Father     No Known Problems Mother          SOCIAL HISTORY:     Social History   Substance Use Topics    Smoking status: Current Every Day Smoker     Packs/day: 1.00     Years: 31.00     Types: Cigarettes    Smokeless tobacco: Never Used      Comment: pt is quiting on his own - pt stated not qualified for program; 2/16 pt  quit on his own    Alcohol use 0.0 oz/week      Comment: 2 glasses wine daily     Social History     Social History    Marital status:      Spouse name: N/A    Number of children: N/A    Years of education: N/A     Occupational History     [employer]      Social History Main Topics    Smoking status: Current Every Day Smoker     Packs/day: 1.00     Years: 31.00     Types: Cigarettes    Smokeless tobacco: Never Used      Comment: pt is quiting on his own - pt stated not qualified for program; 2/16 pt  quit on his own    Alcohol use 0.0 oz/week      Comment: 2 glasses wine daily    Drug use: No    Sexual activity: Yes     Partners: Female     Birth control/ protection: None      Comment: 10/5/17  with same partner 7 years      Other Topics Concern    None     Social History Narrative    None         ALLERGIES:       Review of patient's allergies indicates:   Allergen Reactions    Lisinopril Anaphylaxis         HEALTH SCREENING:     Influenza vaccine not up-to-date for this season.  Prevnar 13 pneumonia vaccine = no evidence of previous vaccination found in the medical record      HOME MEDICATIONS:     Prior to Admission medications    Medication Sig Start Date End Date Taking? Authorizing Provider   allopurinol (ZYLOPRIM) 100 MG tablet TAKE 1 TABLET (100 MG TOTAL) BY MOUTH 3 (THREE) TIMES DAILY. 11/27/17  Yes Carmencita Ng PA-C   amiodarone (PACERONE) 200 MG Tab TAKE 1 TABLET (200 MG TOTAL) BY MOUTH ONCE DAILY. 6/12/17  Yes Nader Chiu MD   aspirin (ECOTRIN) 81 MG EC tablet Take 81 mg by  mouth. 1 Tablet, Delayed Release (E.C.) Oral Every day   Yes Historical Provider, MD   carvedilol (COREG) 3.125 MG tablet Take 1 tablet (3.125 mg total) by mouth 2 (two) times daily with meals. 2/16/17 2/16/18 Yes Ja Méndez MD   cyanocobalamin, vitamin B-12, (VITAMIN B-12) 50 mcg tablet Take 50 mcg by mouth once daily.   Yes Historical Provider, MD   ERGOCALCIFEROL, VITAMIN D2, (VITAMIN D ORAL) Take by mouth once daily.   Yes Historical Provider, MD   furosemide (LASIX) 40 MG tablet TAKE 1 TABLET BY MOUTH TWICE A DAY AS NEEDED.INCREASE TO 2TABLETS TWICE A DAY FOR ACUTE WEIGHT GAIN 11/20/17  Yes Carmencita Ng PA-C   losartan (COZAAR) 25 MG tablet TAKE 1 TABLET BY MOUTH ONCE DAILY 10/31/17  Yes Carmencita Ng PA-C   metOLazone (ZAROXOLYN) 2.5 MG tablet TAKE 1 TABLET BY MOUTH EVERY OTHER DAY 4/10/17  Yes Carmencita Ng PA-C   potassium chloride SA (K-DUR,KLOR-CON) 20 MEQ tablet TAKE 1 TABLET (20 MEQ TOTAL) BY MOUTH 2 (TWO) TIMES DAILY. 12/13/17  Yes Carmencita Ng PA-C   VITAMIN E ACETATE (VITAMIN E ORAL) Take by mouth once daily.   Yes Historical Provider, MD   hydrALAZINE (APRESOLINE) 25 MG tablet TAKE 1 TABLET BY MOUTH EVERY 12 HOURS. 5/5/17   Ja Méndez MD          [facility] MEDICATIONS:     Scheduled Meds:    amiodarone  200 mg Oral BID    aspirin  81 mg Oral Daily    carvedilol  3.125 mg Oral BID WM    potassium chloride SA  20 mEq Oral BID     Continuous Infusions:   PRN Meds: acetaminophen, acetaminophen, morphine      PHYSICAL EXAM:     Wt Readings from Last 1 Encounters:   12/21/17 2226 119.5 kg (263 lb 7.2 oz)   12/21/17 1603 119.7 kg (264 lb)     Body mass index is 34.76 kg/m².  Vitals:    12/21/17 2212 12/21/17 2226 12/21/17 2239 12/21/17 2359   BP:  112/71  (!) 100/58   BP Location:       Patient Position:    Sitting   Pulse: 76 70 77 76   Resp:  19  18   Temp:  98.7 °F (37.1 °C)  98.6 °F (37 °C)   TempSrc:       SpO2:  97%     Weight:  119.5 kg (263 lb 7.2 oz)     Height:  6'  "1" (1.854 m)            -- General appearance: well developed. appears stated age   -- Head: normocephalic, atraumatic   -- Eyes: conjunctivae clear. Extraocular muscles intact  -- Nose: Nares normal. Septum midline.   -- Mouth/Throat: lips, mucosa, and tongue normal. no throat erythema.   -- Neck: supple, symmetrical, trachea midline, no JVD and thyroid not grossly enlarged, appears symmetric  -- Lungs: clear to auscultation bilaterally. normal respiratory effort. No use of accessory muscles.   -- Chest wall: AICD in anterior thorax.  no tenderness. equal bilateral chest rise   -- Heart: regular rate and regular rhythm. S1, S2 normal.  no click, rub or gallop   -- Abdomen: Obese,  soft, non-tender, non-distended, non-tympanic; bowel sounds normal; limited by body habitus  -- Extremities: no cyanosis, clubbing or edema.   -- Pulses: 2+ and symmetric   -- Skin: color normal, texture normal, turgor normal. No rashes or lesions.   -- Neurologic: Normal strength and tone. No focal numbness or weakness. CNII-XII intact. Deedee coma scale: eyes open spontaneously-4, oriented & converses-5, obeys commands-6.      LABORATORY STUDIES:     Recent Results (from the past 36 hour(s))   Urinalysis    Collection Time: 12/21/17  5:43 PM   Result Value Ref Range    Specimen UA Urine, Clean Catch     Color, UA Yellow Yellow, Straw, Carolin    Appearance, UA Clear Clear    pH, UA 7.0 5.0 - 8.0    Specific Gravity, UA 1.015 1.005 - 1.030    Protein, UA Negative Negative    Glucose, UA Negative Negative    Ketones, UA Negative Negative    Bilirubin (UA) Negative Negative    Occult Blood UA Negative Negative    Nitrite, UA Negative Negative    Urobilinogen, UA Negative <2.0 EU/dL    Leukocytes, UA Negative Negative   Urinalysis Microscopic    Collection Time: 12/21/17  5:43 PM   Result Value Ref Range    RBC, UA 1 0 - 4 /hpf    Microscopic Comment SEE COMMENT    Comprehensive metabolic panel    Collection Time: 12/21/17  6:10 PM   Result " Value Ref Range    Sodium 138 136 - 145 mmol/L    Potassium 3.3 (L) 3.5 - 5.1 mmol/L    Chloride 93 (L) 95 - 110 mmol/L    CO2 30 (H) 23 - 29 mmol/L    Glucose 89 70 - 110 mg/dL    BUN, Bld 41 (H) 6 - 20 mg/dL    Creatinine 2.1 (H) 0.5 - 1.4 mg/dL    Calcium 10.3 8.7 - 10.5 mg/dL    Total Protein 7.8 6.0 - 8.4 g/dL    Albumin 3.9 3.5 - 5.2 g/dL    Total Bilirubin 0.8 0.1 - 1.0 mg/dL    Alkaline Phosphatase 74 55 - 135 U/L    AST 27 10 - 40 U/L    ALT 22 10 - 44 U/L    Anion Gap 15 8 - 16 mmol/L    eGFR if African American 41 (A) >60 mL/min/1.73 m^2    eGFR if non African American 35 (A) >60 mL/min/1.73 m^2   CBC auto differential    Collection Time: 12/21/17  6:10 PM   Result Value Ref Range    WBC 6.24 3.90 - 12.70 K/uL    RBC 5.10 4.60 - 6.20 M/uL    Hemoglobin 14.5 14.0 - 18.0 g/dL    Hematocrit 44.9 40.0 - 54.0 %    MCV 88 82 - 98 fL    MCH 28.4 27.0 - 31.0 pg    MCHC 32.3 32.0 - 36.0 g/dL    RDW 15.7 (H) 11.5 - 14.5 %    Platelets 251 150 - 350 K/uL    MPV 10.9 9.2 - 12.9 fL    Gran # 4.1 1.8 - 7.7 K/uL    Lymph # 1.3 1.0 - 4.8 K/uL    Mono # 0.8 0.3 - 1.0 K/uL    Eos # 0.1 0.0 - 0.5 K/uL    Baso # 0.01 0.00 - 0.20 K/uL    Gran% 65.1 38.0 - 73.0 %    Lymph% 21.2 18.0 - 48.0 %    Mono% 12.2 4.0 - 15.0 %    Eosinophil% 1.0 0.0 - 8.0 %    Basophil% 0.2 0.0 - 1.9 %    Differential Method Automated    Magnesium    Collection Time: 12/21/17  6:10 PM   Result Value Ref Range    Magnesium 2.4 1.6 - 2.6 mg/dL   Troponin I    Collection Time: 12/21/17  6:10 PM   Result Value Ref Range    Troponin I 0.090 (H) 0.000 - 0.026 ng/mL       Lab Results   Component Value Date    INR 1.3 (H) 09/22/2015    INR 1.0 10/29/2014    INR 1.0 05/23/2014     Lab Results   Component Value Date    HGBA1C 5.6 08/04/2016     No results for input(s): POCTGLUCOSE in the last 72 hours.            IMAGING:     Imaging Results          X-Ray Chest 1 View (Final result)  Result time 12/21/17 19:11:35    Final result by Letty Ford MD  (12/21/17 19:11:35)                 Impression:      Cardiomegaly with central pulmonary vascular congestion and suspected mild edema.      Electronically signed by: DONATO CHOU MD  Date:     12/21/17  Time:    19:11              Narrative:    Chest AP single view.  Comparison: 10/12/17.    There is prominent stable cardiomegaly.  Left-sided pacer device is visualized.  Lungs are symmetrically expanded.  There is prominence of central pulmonary vasculature with mild perihilar opacity and increased interstitial attenuation.  Pattern is most suggestive for pulmonary vascular congestion with mild edema.  No evidence of confluent focal consolidation, pneumothorax, or significant effusion.  No acute osseous abnormality identified.                                CONSULTS:     IP CONSULT TO CARDIOLOGY       ASSESSMENT & PLAN:     Primary Diagnosis:  Syncope    Active Hospital Problems    Diagnosis  POA    *Syncope [R55]  Yes     Priority: 1 - High    Chronic combined systolic and diastolic congestive heart failure [I50.42]  Yes     Priority: 2     Elevated troponin [R74.8]  Yes    Biventricular implantable cardioverter-defibrillator in situ [Z95.810]  Yes    Cigarette nicotine dependence without complication [F17.210]  Yes    Hypertension, well controlled [I10]  Yes      Resolved Hospital Problems    Diagnosis Date Resolved POA   No resolved problems to display.         Syncope  Syncope and collapse with possible seizure activity  · Unclear exact etiology of his condition  · Differential includes: Arrhythmia, carotid insufficiency, orthostatic hypotension, CVA/TIA,  Seizure, somatization disorder, anxiety, hypoglycemia, CNS disorder, medication side effect  · Monitor with telemetry  · CT of head negative. Obtain MRI of brain if cardiac workup negative  · Carotid ultrasound  · Monitor blood pressure closely including orthostatics  · Monitor Accu-Cheks as outlined below   · Neurology consult   · Consider EEG if the  above is negative    Chronic combined systolic and diastolic congestive heart failure  · Evidenced by history,   · No evidence of pulmonary edema, peripheral edema  · BNP can be falsely low in morbidly obese  · Provide diuresis w/ by mouth medication  · Maintain w/ beta-blocker  · ALLERGIC to ACE inhibitor   · Daily Weights  · Strict I/O  · Low-sodium cardiac diet  · Chest X-ray shows evidence of pulmonary edema and cardiomegaly  · Check TSH, albumin, UA, and renal function  · Obtain 2D echo if <6 months     EF   Date Value Ref Range Status   10/13/2017 10 (A) 55 - 65    08/10/2016 10 (A) 55 - 65      · EKG and cardiac enzymes PRN  · DVT prophylaxis w/ pharmacological and/or mechanical measures  · Oxygen supplementation support PRN    Instructions given to patient/family:  Monitor daily weight.  Regular activity within patient's limitations.  Low salt, low fat and low choleterol diet and restrict fluid < 2L per day.  Call MD if SOB, chest pain, weight gain > 2-3 lbs per day and/or 5-6 lbs per week.   No smoking. Annual influenza vaccine required.      Biventricular implantable cardioverter-defibrillator in situ    Will interrogate pacemaker as arrhythmia is highly differential for etiology of syncope    Cigarette nicotine dependence without complication    · Chronic tobacco use  · Tobacco cessation counseling  · Nicotine patch offered; consider Wellbutrin or Chantix through PCP as an outpatient (will require closer monitoring)  · I discussed with the patient regarding the hazardous effects of smoking on increasing risk of heart attack and stroke, worsening lung functions, and increasing cancer risk.   Patient was urged to stop smoking now.  I also offered nicotine taper (such as nicotine patch and gum) to help ease the craving to smoke.        Hypertension, well controlled    · Goal while inpatient is a systolic blood pressure less than 160mmHg  · BP in acceptable range at this time  · Continue current home regimen  with hold parameters  · PRN antihypertensives available      Elevated troponin    Patient appears to have an elevated troponin at baseline.  This is near his usual level and consistent with CAD and CHF.  There are no EKG findings consistent with acute ischemia.  However we will continue to trend troponin level and monitor on telemetry.        VTE Risk Mitigation         Ordered     Medium Risk of VTE  Once      12/21/17 2237     Place DURAN hose  Until discontinued      12/21/17 2237     Place sequential compression device  Until discontinued      12/21/17 2237            Adult PRN medications available   DVT prophylaxis given       DISPOSITION:     Will admit to the Hospital Medicine service for further evaluation and treatment.    Chart reviewed and updated where applicable.    High Risk Conditions:  Patient has a condition that poses threat to life and bodily function: Syncope and collapse      ===============================================================    Raz Pulliam MD, MPH  Department of Hospital Medicine   [facility]  [phone]  (7pm - 6am)          This note is dictated using Dragon Medical 360 voice recognition software.  There are word recognition mistakes that are occasionally missed on review.

## 2017-12-22 NOTE — PROGRESS NOTES
MARIA DEL CARMEN contacted [facility] @ [phone] to schedule cardiology follow-up, MARIA DEL CARMEN spoke to Svetlana that reported there's no available appointments for Dr. Chiu until 1/23/18, she will send a message to nurse so they can schedule a sooner appointment for patient. Svetlana reported clinic will contact patient directly with appointment date/time.

## 2017-12-23 VITALS
OXYGEN SATURATION: 98 % | TEMPERATURE: 99 F | DIASTOLIC BLOOD PRESSURE: 84 MMHG | BODY MASS INDEX: 34.91 KG/M2 | WEIGHT: 263.44 LBS | RESPIRATION RATE: 18 BRPM | HEART RATE: 71 BPM | SYSTOLIC BLOOD PRESSURE: 110 MMHG | HEIGHT: 73 IN

## 2017-12-23 LAB
ANION GAP SERPL CALC-SCNC: 14 MMOL/L
ANION GAP SERPL CALC-SCNC: 15 MMOL/L
BASOPHILS # BLD AUTO: 0.01 K/UL
BASOPHILS NFR BLD: 0.2 %
BUN SERPL-MCNC: 40 MG/DL
BUN SERPL-MCNC: 40 MG/DL
CALCIUM SERPL-MCNC: 10 MG/DL
CALCIUM SERPL-MCNC: 10.2 MG/DL
CHLORIDE SERPL-SCNC: 94 MMOL/L
CHLORIDE SERPL-SCNC: 95 MMOL/L
CHOLEST SERPL-MCNC: 137 MG/DL
CHOLEST/HDLC SERPL: 3 {RATIO}
CO2 SERPL-SCNC: 27 MMOL/L
CO2 SERPL-SCNC: 29 MMOL/L
CREAT SERPL-MCNC: 2 MG/DL
CREAT SERPL-MCNC: 2.1 MG/DL
DIFFERENTIAL METHOD: ABNORMAL
EOSINOPHIL # BLD AUTO: 0.1 K/UL
EOSINOPHIL NFR BLD: 1.7 %
ERYTHROCYTE [DISTWIDTH] IN BLOOD BY AUTOMATED COUNT: 15.5 %
EST. GFR  (AFRICAN AMERICAN): 41 ML/MIN/1.73 M^2
EST. GFR  (AFRICAN AMERICAN): 43 ML/MIN/1.73 M^2
EST. GFR  (NON AFRICAN AMERICAN): 35 ML/MIN/1.73 M^2
EST. GFR  (NON AFRICAN AMERICAN): 38 ML/MIN/1.73 M^2
GLUCOSE SERPL-MCNC: 139 MG/DL
GLUCOSE SERPL-MCNC: 88 MG/DL
HCT VFR BLD AUTO: 45.9 %
HDLC SERPL-MCNC: 45 MG/DL
HDLC SERPL: 32.8 %
HGB BLD-MCNC: 15.1 G/DL
LDLC SERPL CALC-MCNC: 70.4 MG/DL
LYMPHOCYTES # BLD AUTO: 0.9 K/UL
LYMPHOCYTES NFR BLD: 16.6 %
MCH RBC QN AUTO: 29.2 PG
MCHC RBC AUTO-ENTMCNC: 32.9 G/DL
MCV RBC AUTO: 89 FL
MONOCYTES # BLD AUTO: 0.7 K/UL
MONOCYTES NFR BLD: 12.5 %
NEUTROPHILS # BLD AUTO: 3.7 K/UL
NEUTROPHILS NFR BLD: 69 %
NONHDLC SERPL-MCNC: 92 MG/DL
PHOSPHATE SERPL-MCNC: 3.2 MG/DL
PLATELET # BLD AUTO: 216 K/UL
PMV BLD AUTO: 11.4 FL
POTASSIUM SERPL-SCNC: 2.9 MMOL/L
POTASSIUM SERPL-SCNC: 3.6 MMOL/L
RBC # BLD AUTO: 5.17 M/UL
SODIUM SERPL-SCNC: 135 MMOL/L
SODIUM SERPL-SCNC: 139 MMOL/L
TRIGL SERPL-MCNC: 108 MG/DL
WBC # BLD AUTO: 5.42 K/UL

## 2017-12-23 PROCEDURE — 85025 COMPLETE CBC W/AUTO DIFF WBC: CPT

## 2017-12-23 PROCEDURE — 25000003 PHARM REV CODE 250: Performed by: INTERNAL MEDICINE

## 2017-12-23 PROCEDURE — 36415 COLL VENOUS BLD VENIPUNCTURE: CPT

## 2017-12-23 PROCEDURE — 80048 BASIC METABOLIC PNL TOTAL CA: CPT | Mod: 91

## 2017-12-23 PROCEDURE — 94761 N-INVAS EAR/PLS OXIMETRY MLT: CPT

## 2017-12-23 PROCEDURE — 25000003 PHARM REV CODE 250: Performed by: EMERGENCY MEDICINE

## 2017-12-23 PROCEDURE — 25000003 PHARM REV CODE 250: Performed by: HOSPITALIST

## 2017-12-23 PROCEDURE — 63600175 PHARM REV CODE 636 W HCPCS: Performed by: INTERNAL MEDICINE

## 2017-12-23 PROCEDURE — G0378 HOSPITAL OBSERVATION PER HR: HCPCS

## 2017-12-23 PROCEDURE — 80061 LIPID PANEL: CPT

## 2017-12-23 PROCEDURE — 80048 BASIC METABOLIC PNL TOTAL CA: CPT

## 2017-12-23 PROCEDURE — 99213 OFFICE O/P EST LOW 20 MIN: CPT | Mod: ,,, | Performed by: INTERNAL MEDICINE

## 2017-12-23 PROCEDURE — 84100 ASSAY OF PHOSPHORUS: CPT

## 2017-12-23 RX ORDER — FUROSEMIDE 80 MG/1
80 TABLET ORAL 2 TIMES DAILY
Qty: 60 TABLET | Refills: 11 | Status: ON HOLD | OUTPATIENT
Start: 2017-12-23 | End: 2018-01-17 | Stop reason: HOSPADM

## 2017-12-23 RX ORDER — POTASSIUM CHLORIDE 20 MEQ/15ML
40 SOLUTION ORAL ONCE
Status: COMPLETED | OUTPATIENT
Start: 2017-12-23 | End: 2017-12-23

## 2017-12-23 RX ORDER — FUROSEMIDE 40 MG/1
80 TABLET ORAL 2 TIMES DAILY
Status: DISCONTINUED | OUTPATIENT
Start: 2017-12-23 | End: 2017-12-23 | Stop reason: HOSPADM

## 2017-12-23 RX ORDER — POTASSIUM CHLORIDE 7.45 MG/ML
10 INJECTION INTRAVENOUS
Status: DISPENSED | OUTPATIENT
Start: 2017-12-23 | End: 2017-12-23

## 2017-12-23 RX ORDER — POTASSIUM CHLORIDE 20 MEQ/1
40 TABLET, EXTENDED RELEASE ORAL 2 TIMES DAILY
Qty: 90 TABLET | Refills: 1 | Status: ON HOLD | OUTPATIENT
Start: 2017-12-23 | End: 2018-01-17

## 2017-12-23 RX ADMIN — CARVEDILOL 3.12 MG: 3.12 TABLET, FILM COATED ORAL at 08:12

## 2017-12-23 RX ADMIN — ASPIRIN 81 MG: 81 TABLET, COATED ORAL at 08:12

## 2017-12-23 RX ADMIN — AMIODARONE HYDROCHLORIDE 200 MG: 200 TABLET ORAL at 08:12

## 2017-12-23 RX ADMIN — POTASSIUM CHLORIDE 10 MEQ: 10 INJECTION, SOLUTION INTRAVENOUS at 03:12

## 2017-12-23 RX ADMIN — FUROSEMIDE 80 MG: 40 TABLET ORAL at 05:12

## 2017-12-23 RX ADMIN — POTASSIUM CHLORIDE 40 MEQ: 20 SOLUTION ORAL at 03:12

## 2017-12-23 RX ADMIN — POTASSIUM CHLORIDE 20 MEQ: 1500 TABLET, EXTENDED RELEASE ORAL at 08:12

## 2017-12-23 RX ADMIN — POTASSIUM CHLORIDE 10 MEQ: 10 INJECTION, SOLUTION INTRAVENOUS at 02:12

## 2017-12-23 RX ADMIN — FUROSEMIDE 80 MG: 10 INJECTION, SOLUTION INTRAMUSCULAR; INTRAVENOUS at 08:12

## 2017-12-23 NOTE — PLAN OF CARE
Problem: Cardiac: Heart Failure (Adult)  Intervention: Promote Functional Ability   12/22/17 0900 12/22/17 1720   Daily Care Interventions   Self-Care Promotion independence encouraged --    Activity   Activity Assistance Provided --  independent     Intervention: Provide Oxygenation/Ventilation/Perfusion Support   12/22/17 1720   Activity   Activity Type activity adjusted per tolerance;bedrest   Positioning   Head of Bed (HOB) HOB at 45 degrees     Intervention: Support Psychosocial Response to Heart Failure   12/22/17 1801   Coping/Psychosocial Interventions   Supportive Measures active listening utilized;positive reinforcement provided;verbalization of feelings encouraged;self-care encouraged     Intervention: Gradually Correct Positive Fluid Balance   12/22/17 1720   Positioning   Body Position positioned/repositioned independently;supine, head elevated     Intervention: Prevent/Manage DVT/VTE Risk   12/22/17 1720   Minimize Embolism Risk   VTE Prevention/Management fluids promoted;ROM (active) performed   OTHER   VTE Required Core Measure Sequential compression device initiated/maintained

## 2017-12-23 NOTE — PROGRESS NOTES
[facility]  Cardiology  Progress Note    Patient Name: Tim Richards  MRN: [id number]  Admission Date: 12/21/2017  Hospital Length of Stay: 1 days  Code Status: Full Code   Attending Physician: Stephania Craig MD   Primary Care Physician: Ja Méndez MD  Expected Discharge Date:   Principal Problem:Syncope    Subjective:     Hospital Course:   12/22: diuresis, device interrogated showing multiple discharges secondary to VT    Interval History: No acute events.   Feels back to baseline    Tele: bive paced     Review of Systems   All other systems reviewed and are negative.    Objective:     Vital Signs (Most Recent):  Temp: 99.1 °F (37.3 °C) (12/23/17 0736)  Pulse: 70 (12/23/17 0758)  Resp: 18 (12/23/17 0736)  BP: 112/84 (12/23/17 0807)  SpO2: 95 % (12/23/17 0901) Vital Signs (24h Range):  Temp:  [98 °F (36.7 °C)-99.1 °F (37.3 °C)] 99.1 °F (37.3 °C)  Pulse:  [51-78] 70  Resp:  [16-18] 18  SpO2:  [94 %-97 %] 95 %  BP: ()/(63-85) 112/84     Weight: 119.5 kg (263 lb 7.2 oz)  Body mass index is 34.76 kg/m².     SpO2: 95 %  O2 Device (Oxygen Therapy): room air      Intake/Output Summary (Last 24 hours) at 12/23/17 1120  Last data filed at 12/23/17 0906   Gross per 24 hour   Intake             1080 ml   Output             3325 ml   Net            -2245 ml       Lines/Drains/Airways     Peripheral Intravenous Line                 Peripheral IV - Single Lumen 12/21/17 1810 Left Antecubital 1 day                Physical Exam   Constitutional: He is oriented to person, place, and time. No distress.   Cardiovascular: Normal rate and regular rhythm.    Pulmonary/Chest: Effort normal and breath sounds normal.   Musculoskeletal: He exhibits no edema.   Neurological: He is alert and oriented to person, place, and time.       Significant Labs:   BMP:   Recent Labs  Lab 12/21/17  1810 12/22/17  0648 12/22/17  1038   GLU 89 124*  --     136  --    K 3.3* 3.2*  --    CL 93* 96  --    CO2  30* 29  --    BUN 41* 38*  --    CREATININE 2.1* 2.0*  --    CALCIUM 10.3 9.8  --    MG 2.4  --  2.5    and CBC   Recent Labs  Lab 12/21/17  1810 12/23/17  0607   WBC 6.24 5.42   HGB 14.5 15.1   HCT 44.9 45.9    216       Significant Imaging: Echocardiogram:   2D echo with color flow doppler:   Results for orders placed or performed during the hospital encounter of 10/13/17   2D echo with color flow doppler   Result Value Ref Range    EF 10 (A) 55 - 65    Mitral Valve Regurgitation MODERATE (A)     Diastolic Dysfunction Yes (A)     Est. PA Systolic Pressure 34.89     Mitral Valve Mobility NORMAL     Tricuspid Valve Regurgitation TRIVIAL      Assessment and Plan:     Brief HPI:     * Syncope    Likely secondary to underlying low output state        Acute combined systolic and diastolic heart failure    Diuresis adequate  Continue beta blocker  Off ace/ARB secondary to serum creatinine  Previously refused BiDil secondary to Cialis usage  Will need outpatient referral to heart failure/transplant  Change back to maintenance Lasix 80 twice a day       Hypertension, well controlled             Alcohol abuse    Counseled        Cigarette nicotine dependence without complication    Counseled        Biventricular implantable cardioverter-defibrillator in situ     multiple AICD discharges secondary to VT  Possible outpatient EP referral  Continue amiodarone            VTE Risk Mitigation         Ordered     Medium Risk of VTE  Once      12/21/17 2237     Place DURAN hose  Until discontinued      12/21/17 2237     Place sequential compression device  Until discontinued      12/21/17 2237        Okay for DC from CV standpoint.  Follow-up with me in one week    Nader Chiu MD  Cardiology  [facility]

## 2017-12-23 NOTE — PLAN OF CARE
Problem: Cardiac: Heart Failure (Adult)  Intervention: Promote Functional Ability   12/23/17 1120 12/23/17 1337   Daily Care Interventions   Self-Care Promotion --  independence encouraged   Activity   Activity Assistance Provided independent --      Intervention: Provide Oxygenation/Ventilation/Perfusion Support   12/23/17 1120   Activity   Activity Type bedrest   Positioning   Head of Bed (HOB) HOB at 30 degrees     Intervention: Support Psychosocial Response to Heart Failure   12/23/17 1337   Coping/Psychosocial Interventions   Supportive Measures active listening utilized;verbalization of feelings encouraged;self-responsibility promoted;self-care encouraged     Intervention: Gradually Correct Positive Fluid Balance   12/23/17 1120   Positioning   Body Position positioned/repositioned independently     Intervention: Prevent/Manage DVT/VTE Risk   12/23/17 0807   Minimize Embolism Risk   VTE Prevention/Management remove, assess skin and reapply sequential compression device   OTHER   VTE Required Core Measure Sequential compression device initiated/maintained       Goal: Signs and Symptoms of Listed Potential Problems Will be Absent, Minimized or Managed (Cardiac: Heart Failure)  Signs and symptoms of listed potential problems will be absent, minimized or managed by discharge/transition of care (reference Cardiac: Heart Failure (Adult) CPG).    12/23/17 1337   Cardiac: Heart Failure   Problems Assessed (Heart Failure) all   Problems Present (Heart Failure) fluid/electrolyte imbalance

## 2017-12-23 NOTE — SUBJECTIVE & OBJECTIVE
Interval History: No acute events.   Feels back to baseline    Tele: bive paced     Review of Systems   All other systems reviewed and are negative.    Objective:     Vital Signs (Most Recent):  Temp: 99.1 °F (37.3 °C) (12/23/17 0736)  Pulse: 70 (12/23/17 0758)  Resp: 18 (12/23/17 0736)  BP: 112/84 (12/23/17 0807)  SpO2: 95 % (12/23/17 0901) Vital Signs (24h Range):  Temp:  [98 °F (36.7 °C)-99.1 °F (37.3 °C)] 99.1 °F (37.3 °C)  Pulse:  [51-78] 70  Resp:  [16-18] 18  SpO2:  [94 %-97 %] 95 %  BP: ()/(63-85) 112/84     Weight: 119.5 kg (263 lb 7.2 oz)  Body mass index is 34.76 kg/m².     SpO2: 95 %  O2 Device (Oxygen Therapy): room air      Intake/Output Summary (Last 24 hours) at 12/23/17 1120  Last data filed at 12/23/17 0906   Gross per 24 hour   Intake             1080 ml   Output             3325 ml   Net            -2245 ml       Lines/Drains/Airways     Peripheral Intravenous Line                 Peripheral IV - Single Lumen 12/21/17 1810 Left Antecubital 1 day                Physical Exam   Constitutional: He is oriented to person, place, and time. No distress.   Cardiovascular: Normal rate and regular rhythm.    Pulmonary/Chest: Effort normal and breath sounds normal.   Musculoskeletal: He exhibits no edema.   Neurological: He is alert and oriented to person, place, and time.       Significant Labs:   BMP:   Recent Labs  Lab 12/21/17 1810 12/22/17  0648 12/22/17  1038   GLU 89 124*  --     136  --    K 3.3* 3.2*  --    CL 93* 96  --    CO2 30* 29  --    BUN 41* 38*  --    CREATININE 2.1* 2.0*  --    CALCIUM 10.3 9.8  --    MG 2.4  --  2.5    and CBC   Recent Labs  Lab 12/21/17 1810 12/23/17  0607   WBC 6.24 5.42   HGB 14.5 15.1   HCT 44.9 45.9    216       Significant Imaging: Echocardiogram:   2D echo with color flow doppler:   Results for orders placed or performed during the hospital encounter of 10/13/17   2D echo with color flow doppler   Result Value Ref Range    EF 10 (A) 55 - 65     Mitral Valve Regurgitation MODERATE (A)     Diastolic Dysfunction Yes (A)     Est. PA Systolic Pressure 34.89     Mitral Valve Mobility NORMAL     Tricuspid Valve Regurgitation TRIVIAL

## 2017-12-23 NOTE — PROGRESS NOTES
Attempted to schedule [facility] Follow-up and   Cardiac Electrophysiology but Main [place] Appointment Desk was closed at 3pm. Provided patient with contact numbers to call on Tuesday to schedule appointments

## 2017-12-23 NOTE — NURSING
Resting in bed. AAOx4. Denies discomfort, SOB, or palpitations. SCD in use. Bed locked in lowest position, reinforced to call for assistance, call bell and urinal in reach.

## 2017-12-23 NOTE — NURSING
Report given to EMILY Dyer. Patient sitting up in bed with wife at bedside. Patient denies any complaints at this time. Bed in lowest position, call light within reach.

## 2017-12-23 NOTE — PLAN OF CARE
Problem: Arrhythmia/Dysrhythmia (Symptomatic) (Adult)  Intervention: Prevent/Manage Thrombi/Emboli   12/22/17 1720 12/22/17 2020   Safety Interventions   Bleeding Precautions --  monitored for signs of bleeding   Minimize Embolism Risk   VTE Prevention/Management fluids promoted;ROM (active) performed --    OTHER   VTE Required Core Measure --  Sequential compression device initiated/maintained     Intervention: Promote Hemodynamic Stability   12/23/17 0420 12/23/17 0534   Cognitive Interventions   Sensory Stimulation Regulation --  music/television provided for relaxation;visual stimulation provided   Positioning   Head of Bed (HOB) HOB elevated --        Goal: Signs and Symptoms of Listed Potential Problems Will be Absent, Minimized or Managed (Arrhythmia/Dysrhythmia)  Signs and symptoms of listed potential problems will be absent, minimized or managed by discharge/transition of care (reference Arrhythmia/Dysrhythmia (Symptomatic) (Adult) CPG).   Outcome: Ongoing (interventions implemented as appropriate)   12/23/17 0534   Arrhythmia/Dysrhythmia (Symptomatic)   Problems Assessed (Arrhythmia/Dysrhythmia) all   Problems Present (Arrhythmia/Dysrhythmia) none

## 2017-12-24 NOTE — DISCHARGE SUMMARY
[facility] Hospital Medicine  Discharge Summary      Patient Name: Tim Richards  MRN: [id number]  Admission Date: 12/21/2017  Hospital Length of Stay: 1 days  Discharge Date and Time: 12/23/2017  6:36 PM  Attending Physician: No att. providers found   Discharging Provider: Stephania Craig MD  Primary Care Provider: Ja Méndez MD      HPI:   Tim Richards is a 53 yo man with CHF (Echo 10/2017 EF 10%, G3DD), BiV Medtronic AICD, essential HTN, tobacco abuse, obesity, and gout who presented for weakness and fatigue.  He reports  3 episodes in which he felt like he was going to pass out.  He thinks he may have had a seizure sometime during the night.  He has a history of cardiac disease and dysrhythmia as well as an ejection fraction of 10% with his most recent echocardiogram in October. He reports compliance with his home medication regimen and states he recently stopped lisinopril due to an allergy.  He does however admit that he has been less compliant with his diet due to the holidays and has increased his diuretic recently. Otherwise he denies chest pain. He has associated dyspnea with exertion as well as shortness of breath at rest.  He also complains of unintentional weight gain and trace peripheral edema.     Hospital Course:   In the emergency department routine laboratory studies including EKG, chest x-ray, and cardiac enzymes were obtained.  There is evidence of mildly elevated troponin level which is consistent with previous findings.  Head CT was negative for acute findings.  There is concern the patient may be having a possible arrhythmia or seizures. He was admitted to telemetry and cardiology was consulted.   He was diuresed with IV lasix per cardiology. His AICD was found to have fired multiple times for VT. Syncope likely 2/2 low output state. Telemetry only notable for frequent PVCs. Continue BB and amiodarone. No ACEi/ARB 2/2 worsening renal function. He  refuses BiDil because he continues to use Cialis. His home Lasix was increased to 80mg BID with increased potassium supplementation at discharge. He needs to follow up next week with BMP to check his renal function and potassium levels.   He needs to follow up with EP and heart failure/transplant at [facility]. He was counseled on alcohol and tobacco cessation. He is relatively young and in my opinion does not seem to fully grasp the gravity of his condition. Patient and significant other at bedside counseled.     Consults:   Consults         Status Ordering Provider     Inpatient consult to Cardiology  Once     Provider:  Nader Chiu MD    Completed FREDDY TORRES          No new Assessment & Plan notes have been filed under this hospital service since the last note was generated.  Service: Hospital Medicine    Final Active Diagnoses:    Diagnosis Date Noted POA    PRINCIPAL PROBLEM:  Syncope [R55] 07/31/2017 Yes    Elevated troponin [R74.8] 12/22/2017 Yes    Acute combined systolic and diastolic heart failure [I50.41] 09/23/2015 Yes    Biventricular implantable cardioverter-defibrillator in situ [Z95.810] 11/20/2014 Yes    Cigarette nicotine dependence without complication [F17.210] 05/23/2014 Yes    Alcohol abuse [F10.10] 05/23/2014 Yes    Chronic combined systolic and diastolic congestive heart failure [I50.42]  Yes    Hypertension, well controlled [I10]  Yes      Problems Resolved During this Admission:    Diagnosis Date Noted Date Resolved POA       Discharged Condition: stable    Disposition: Home or Self Care    Follow Up:  Follow-up Information     Ja Méndez MD. Go on 1/11/2018.    Specialty:  Family Medicine  Why:  Outpatient Services, PCP Follow-up Appointment, Please arrive to clinic for 3:40PM  Contact information:  [address] [place] [facility] [address]  [phone]             Nader Chiu MD.    Specialties:  INTERVENTIONAL CARDIOLOGY,  Cardiology  Why:  Outpatient Services, Cardiology Follow-up Appointment, Clinic will contact you directly with appointment date/time  Contact information:  [address]  [address]  [place] [address]  [phone]             Ja Méndez MD In 3 days.    Specialty:  Family Medicine  Why:  check potassium and renal function in 3-4 days  Contact information:  [address] [place] [facility]  [place] [id number] [phone]                 Patient Instructions:     Activity as tolerated     Notify your health care provider if you experience any of the following:  increased confusion or weakness     Notify your health care provider if you experience any of the following:  persistent dizziness, light-headedness, or visual disturbances     Notify your health care provider if you experience any of the following:  worsening rash     Notify your health care provider if you experience any of the following:  severe persistent headache     Notify your health care provider if you experience any of the following:  difficulty breathing or increased cough     Notify your health care provider if you experience any of the following:  redness, tenderness, or signs of infection (pain, swelling, redness, odor or green/yellow discharge around incision site)     Notify your health care provider if you experience any of the following:  severe uncontrolled pain     Notify your health care provider if you experience any of the following:  persistent nausea and vomiting or diarrhea     Notify your health care provider if you experience any of the following:  temperature >100.4     Medications:  Reconciled Home Medications:   Discharge Medication List as of 12/23/2017  6:15 PM      CONTINUE these medications which have CHANGED    Details   furosemide (LASIX) 80 MG tablet Take 1 tablet (80 mg total) by mouth 2 (two) times daily., Starting Sat 12/23/2017, Until Sun 12/23/2018, Normal      potassium chloride SA  (K-DUR,KLOR-CON) 20 MEQ tablet Take 2 tablets (40 mEq total) by mouth 2 (two) times daily., Starting Sat 12/23/2017, Normal         CONTINUE these medications which have NOT CHANGED    Details   allopurinol (ZYLOPRIM) 100 MG tablet TAKE 1 TABLET (100 MG TOTAL) BY MOUTH 3 (THREE) TIMES DAILY., Normal      amiodarone (PACERONE) 200 MG Tab TAKE 1 TABLET (200 MG TOTAL) BY MOUTH ONCE DAILY., Normal      aspirin (ECOTRIN) 81 MG EC tablet Take 81 mg by mouth. 1 Tablet, Delayed Release (E.C.) Oral Every day, Until Discontinued, Historical Med      carvedilol (COREG) 3.125 MG tablet Take 1 tablet (3.125 mg total) by mouth 2 (two) times daily with meals., Starting 2/16/2017, Until Fri 2/16/18, Normal      cyanocobalamin, vitamin B-12, (VITAMIN B-12) 50 mcg tablet Take 50 mcg by mouth once daily., Until Discontinued, Historical Med      ERGOCALCIFEROL, VITAMIN D2, (VITAMIN D ORAL) Take by mouth once daily., Until Discontinued, Historical Med      hydrALAZINE (APRESOLINE) 25 MG tablet TAKE 1 TABLET BY MOUTH EVERY 12 HOURS., Normal      losartan (COZAAR) 25 MG tablet TAKE 1 TABLET BY MOUTH ONCE DAILY, Normal      metOLazone (ZAROXOLYN) 2.5 MG tablet TAKE 1 TABLET BY MOUTH EVERY OTHER DAY, Normal      VITAMIN E ACETATE (VITAMIN E ORAL) Take by mouth once daily., Until Discontinued, Historical Med             Indwelling Lines/Drains at time of discharge:   Lines/Drains/Airways          No matching active lines, drains, or airways          Time spent on the discharge of patient: 45 minutes  Patient was seen and examined on the date of discharge and determined to be suitable for discharge.         Stephania Craig MD  Department of Hospital Medicine  [facility]

## 2017-12-29 ENCOUNTER — TELEPHONE (OUTPATIENT)
Dept: TRANSPLANT | Facility: CLINIC | Age: 52
End: 2017-12-29

## 2017-12-29 DIAGNOSIS — I50.9 CONGESTIVE HEART FAILURE, UNSPECIFIED CONGESTIVE HEART FAILURE CHRONICITY, UNSPECIFIED CONGESTIVE HEART FAILURE TYPE: Primary | ICD-10-CM

## 2017-12-29 NOTE — TELEPHONE ENCOUNTER
REFERRAL NOTE:    Tim Richards has been referred to the pre-heart transplant office for consideration for orthotopic heart transplantation by Dr. Chiu.  Patient's appointments have been scheduled for 1/10/18.  Information was provided and questions were answered.  Appt slip was mailed to patient.  As patient was referred by an [facility] cardiologist, records are in [facility].

## 2018-01-10 ENCOUNTER — INITIAL CONSULT (OUTPATIENT)
Dept: TRANSPLANT | Facility: CLINIC | Age: 53
DRG: 286 | End: 2018-01-10
Attending: INTERNAL MEDICINE
Payer: COMMERCIAL

## 2018-01-10 VITALS
HEART RATE: 115 BPM | HEIGHT: 73 IN | DIASTOLIC BLOOD PRESSURE: 53 MMHG | WEIGHT: 268.06 LBS | SYSTOLIC BLOOD PRESSURE: 103 MMHG | BODY MASS INDEX: 35.53 KG/M2

## 2018-01-10 DIAGNOSIS — Z45.02 IMPLANTABLE CARDIOVERTER-DEFIBRILLATOR (ICD) DISCHARGE: ICD-10-CM

## 2018-01-10 DIAGNOSIS — Z79.899 HIGH RISK MEDICATIONS (NOT ANTICOAGULANTS) LONG-TERM USE: ICD-10-CM

## 2018-01-10 DIAGNOSIS — I50.42 CHRONIC COMBINED SYSTOLIC AND DIASTOLIC CONGESTIVE HEART FAILURE: Primary | ICD-10-CM

## 2018-01-10 DIAGNOSIS — I42.0 DILATED CARDIOMYOPATHY: ICD-10-CM

## 2018-01-10 DIAGNOSIS — I47.29 PVT (PAROXYSMAL VENTRICULAR TACHYCARDIA): ICD-10-CM

## 2018-01-10 PROBLEM — I47.20 PVT (PAROXYSMAL VENTRICULAR TACHYCARDIA): Status: ACTIVE | Noted: 2018-01-10

## 2018-01-10 PROCEDURE — 99213 OFFICE O/P EST LOW 20 MIN: CPT | Mod: PBBFAC,TXP | Performed by: INTERNAL MEDICINE

## 2018-01-10 PROCEDURE — 99999 PR PBB SHADOW E&M-EST. PATIENT-LVL III: CPT | Mod: PBBFAC,,, | Performed by: INTERNAL MEDICINE

## 2018-01-10 PROCEDURE — 99215 OFFICE O/P EST HI 40 MIN: CPT | Mod: S$GLB,,, | Performed by: INTERNAL MEDICINE

## 2018-01-10 RX ORDER — VALSARTAN 80 MG/1
40 TABLET ORAL 2 TIMES DAILY
Qty: 30 TABLET | Refills: 1 | Status: SHIPPED | OUTPATIENT
Start: 2018-01-10 | End: 2018-01-18

## 2018-01-10 NOTE — LETTER
January 14, 2018        Nader Chiu  [address]  [address]  [place] [address]  Phone: [phone]  Fax: [phone]             [facility] [address] [place] [address]  Phone: [phone]   Patient: Tim Richards   MR Number: [id number]   YOB: 1965   Date of Visit: 1/10/2018       Dear Dr. Nader Chiu    Thank you for referring Tim Richards to me for evaluation. Attached you will find relevant portions of my assessment and plan of care.    If you have questions, please do not hesitate to call me. I look forward to following Tim Richards along with you.    Sincerely,    Antonio Hadley Jr, MD    Enclosure    If you would like to receive this communication electronically, please contact [email or web address] or [phone] to request [employer] access.    [employer] is a tool which provides read-only access to select patient information with whom you have a relationship. Its easy to use and provides real time access to review your patients record including encounter summaries, notes, results, and demographic information.    If you feel you have received this communication in error or would no longer like to receive these types of communications, please e-mail [email or web address]

## 2018-01-10 NOTE — PROGRESS NOTES
Subjective:   Initial evaluation of heart transplant candidacy.    HPI:  Mr. Richards is a 52 y.o. year old Black or  male who has presents to be considered for advanced surgical options (LVAD/OHT).    This 53 yo BM has had CHF since 2011 at which time an angiogram did not demonstrate any CAD.  He is on amiodarone for VT.     He continues to smoke cigarettes though now down to 1/day, prior smoked 0.5 ppd since age 17.  Uses fifth of gin or rum per week.  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    Currently he reports good and bad days but on good day can walk on 2 blocks without SOB.  He is sleeping at least half way up and still experiencing PND.  No swelling since last hosp stay though just out since 12/23/17.  He has had 3 hosp since July and recurrent ICD discharges with wife saying last few nights ago and him saying 2 weeks ago.    Past Medical History:   Diagnosis Date    CHF (congestive heart failure)     Dilated cardiomyopathy--non-ischemic (cath 2011 no CAD) 1/10/2018    CKD     Gout     HTN (hypertension)     Ventricular tachycardia (paroxysmal)      Past Surgical History:   Procedure Laterality Date    CARDIAC CATHETERIZATION  Dec. 2012    CARDIAC DEFIBRILLATOR PLACEMENT Left     CRRT-D     Family History   Problem Relation Age of Onset    Hypertension Father     Diabetes Father     Coronary artery disease Father     Heart disease Father      CHF    No Known Problems Mother     Cancer Sister 54     breast CA    No Known Problems Brother      Social History    Marital status:      Occupational History     [employer]      Social History Main Topics    Smoking status: Current Every Day Smoker     Packs/day: 1.00     Years: 31.00     Types: Cigarettes    Smokeless tobacco: Never Used      Comment: pt is quiting on his own - pt stated not qualified for program; 2/16 pt  quit on his own    Alcohol use 0.0 oz/week      Comment: one fifth of gin  or rum/week    Drug use: No    Sexual activity: Yes     Partners: Female      Comment: 10/5/17  with same partner 7 years        Social History Narrative    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    amiodarone (PACERONE) 200 MG Tab TAKE 1 TABLET (200 MG TOTAL) BY MOUTH ONCE DAILY.    aspirin (ECOTRIN) 81 MG EC tablet Take 81 mg by mouth. 1 Tablet, Delayed Release (E.C.) Oral Every day    carvedilol (COREG) 3.125 MG tablet Take 1 tablet (3.125 mg total) by mouth 2 (two) times daily with meals.    ERGOCALCIFEROL, VITAMIN D2, (VITAMIN D ORAL) Take by mouth once daily.    furosemide (LASIX) 80 MG tablet Take 1 tablet (80 mg total) by mouth 2 (two) times daily.    metOLazone (ZAROXOLYN) 2.5 MG tablet TAKE 1 TABLET BY MOUTH EVERY OTHER DAY    potassium chloride SA (K-DUR,KLOR-CON) 20 MEQ tablet Take 2 tablets (40 mEq total) by mouth 2 (two) times daily.    VITAMIN E ACETATE (VITAMIN E ORAL) Take by mouth once daily.    Losartan 25 mg Take 1 tablet once daily     Review of patient's allergies indicates:   Allergen Reactions    Lisinopril Anaphylaxis       Review of Systems   Constitution: Negative for chills, decreased appetite, diaphoresis, fever, weakness, malaise/fatigue, night sweats, weight gain and weight loss.   HENT: Negative for ear discharge, ear pain, hearing loss and hoarse voice.    Eyes: Negative for photophobia and visual disturbance.   Cardiovascular: Positive for dyspnea on exertion, leg swelling (sometimes), orthopnea, paroxysmal nocturnal dyspnea and syncope. Negative for chest pain, irregular heartbeat and palpitations.   Respiratory: Negative for cough, hemoptysis, shortness of breath, sputum production and wheezing.    Endocrine: Negative for cold intolerance, heat intolerance, polydipsia and polyuria.   Hematologic/Lymphatic: Negative for bleeding problem. Does  "not bruise/bleed easily.   Musculoskeletal: Negative for arthritis, back pain and joint pain.   Gastrointestinal: Negative for abdominal pain, anorexia, change in bowel habit, dysphagia, heartburn, hematochezia and melena.   Genitourinary: Negative for dysuria, frequency and hematuria.   Neurological: Negative for brief paralysis, focal weakness, headaches and seizures.   Psychiatric/Behavioral: Positive for substance abuse (alcohol 1 fifth/week).     Objective:   Blood pressure (!) 103/53, pulse (!) 115, height 6' 1" (1.854 m), weight 121.6 kg (268 lb 1.3 oz).body mass index is 35.37 kg/m².    Physical Exam   Constitutional: He is oriented to person, place, and time. He appears well-developed and well-nourished. No distress.   BP (!) 103/53 (BP Location: Right arm, Patient Position: Sitting, BP Method: Large (Automatic))   Pulse (!) 115   Ht 6' 1" (1.854 m)   Wt 121.6 kg (268 lb 1.3 oz)   BMI 35.37 kg/m²    HENT:   Head: Normocephalic and atraumatic.   Eyes: Conjunctivae are normal. Right eye exhibits no discharge. Left eye exhibits no discharge. No scleral icterus.   Neck: JVD (18 cm sitting with HJR) present. No tracheal deviation present. No thyromegaly present.   Cardiovascular: Normal rate and regular rhythm.  Exam reveals gallop (S3).    Murmur (grade 1/6 systolic murmur LLSB and apex) heard.  Pulmonary/Chest: Effort normal and breath sounds normal.   Abdominal: Soft. Bowel sounds are normal. He exhibits no distension (liver span 16 cm). There is no tenderness. There is no rebound and no guarding.   Musculoskeletal: He exhibits edema (0.5+). He exhibits no tenderness.   Neurological: He is alert and oriented to person, place, and time.   Grossly intact   Skin: Skin is warm and dry. He is not diaphoretic.   Psychiatric: He has a normal mood and affect. His behavior is normal. Judgment and thought content normal.     Lab Results   Component Value Date    BNP 2,012 (H) 01/10/2018     01/10/2018    K " 3.7 01/10/2018    MG 2.5 12/22/2017    CL 94 (L) 01/10/2018    CO2 33 (H) 01/10/2018    BUN 28 (H) 01/10/2018    CREATININE 2.0 (H) 01/10/2018     01/10/2018    AST 23 01/10/2018    ALT 21 01/10/2018    ALBUMIN 3.6 01/10/2018    PROT 7.6 01/10/2018    BILITOT 1.5 (H) 01/10/2018    WBC 6.75 01/10/2018    HGB 13.9 (L) 01/10/2018    HCT 43.7 01/10/2018     01/10/2018    TSH 4.295 (H) 12/22/2017    CHOL 137 12/23/2017    HDL 45 12/23/2017    LDLCALC 70.4 12/23/2017    TRIG 108 12/23/2017    FREET4 1.19 12/22/2017     ECHO 10/13/2017 CONCLUSIONS     1 - Severely depressed left ventricular systolic function (EF 10-15%).  Severe global hypokinesis, septum appears to contract best.     2 - Eccentric hypertrophy.     3 - Severe left ventricular enlargement.     4 - Restrictive LV filling pattern, indicating markedly elevated LAP (grade 3 diastolic dysfunction).     5 - Mildly depressed right ventricular systolic function .     6 - Moderate mitral regurgitation.  Eccentric jet may underestimate degree of MR.     7 - Trivial tricuspid regurgitation.     8 - The estimated PA systolic pressure is 35 mmHg.     Assessment:      1. Chronic combined systolic and diastolic congestive heart failure    2. Implantable cardioverter-defibrillator (ICD) discharge    3. PVT (paroxysmal ventricular tachycardia)    4. High risk medications (amiodarone) long-term use    5. Dilated cardiomyopathy      Plan:   I feel that he should be admitted as CHF decompensated by both hx and exam.  I am also alarmed at VT which sounds uncontrolled and explained that repetitive ICD shocks can further hurt heart function.  He cannot come into the hospital today but will check with his supervisor at work to see if he can come into the hospital in the next few days.  This is acceptable to me as he really wants to keep working and nothing going on now represents an acute decompensation.  Instead sounds like CHF never resolved completely at last hosp  stay.  No medication changes today with this plan.    I offered help with alcohol abuse and that he should not stop acutely at this level of intake.  Instead I suggested reducing intake by 25% from current intake every week until off.    I have explained that he cannot be offered transplant if still smoking and has to wait 6 months  LVAD may not be a viable option with his VT though in his case, CHF may be provoking the VT so I would certainly explore this with CTS colleagues.    Best course is to admit for accelerated evaluation.  LVAD and tx would also both require cessation of alcohol and I also explained that ii is possible alcohol is contributing to or even causing his CM.  If so, cessation is critical to survival and effective treatment of his condition.    Patient is now NYHA IIIB ACC stage D    Recommend 2 gram sodium restriction and 1500cc fluid restriction.  Encourage physical activity with graded exercise program.  Requested patient to weigh themselves daily, and to notify us if their weight increases by more than 3 lbs in 1 day or 5 lbs in 1 week.     Transplant Candidacy: Patient is a 52 y.o. year old male with heart failure is being seen for possible LVAD and OHT. In my opinion, he is  unacceptable to transplant listing now due to his ongoing smoking, alcohol abuse and would like to see better renal function.  Regarding LVAD, alcohol abuse an issue though it does not interfere with his work thus perhaps can be worked through.  His VT is also a condern (see above) but in his case would ask CTS to consider as CHF is likely fueling the VT.     Patient did meet with MCS and/or pre-transplant coordinator at the end of this visit for workup. he will let us know about admitting for evaluation soon.    UNOS Patient Status  Functional Status: 70% - Cares for self: unable to carry on normal activity or active work--he is working but still 70% even with that hx    Physical Capacity: No Limitations  Working for  Income: yes  If yes, working activity level: Working Full Time    Antonio Haldey Jr, MD

## 2018-01-10 NOTE — PROGRESS NOTES
PRE-EDUCATION BOOKLET NOTE:    Met with Tim Richards and had a brief discussion regarding the advanced heart failure evaluation process including options for heart transplantation and/or left ventricular assist device (LVAD) implantation.     Heart Transplant Educational Booklet given to patient, which included the following handouts:  · Treatment options for Advanced Heart Failure: A Referral Guide for patients  · Pre Heart Transplant Coordinator Team Contact Information   · Recipient Informed Consent   · Wellness Contract  · Multiple Listing Protocol and [facility] toll free numbers   · VAD Education Packet   · Advanced Directives  · 8 Step Plan for Heart Failure Patients     Question and answer session was conducted.  Patient was advised to bring the educational packet to future appointments and/or admissions.  Patient was encouraged to contact the coordinator team with any questions or concerns.  Understanding verbalized.    Pt and wife will discuss the possibility of hospital admit overnight. Pt will check his work schedule in the AM and agrees to call coordinator tomorrow with his decision regarding admission.      Pt admits to both continued smoking and drinking alcohol on a daily basis.

## 2018-01-11 ENCOUNTER — DOCUMENTATION ONLY (OUTPATIENT)
Dept: TRANSPLANT | Facility: CLINIC | Age: 53
End: 2018-01-11

## 2018-01-12 ENCOUNTER — HOSPITAL ENCOUNTER (INPATIENT)
Facility: HOSPITAL | Age: 53
LOS: 5 days | Discharge: HOME | DRG: 286 | End: 2018-01-17
Attending: INTERNAL MEDICINE | Admitting: INTERNAL MEDICINE
Payer: COMMERCIAL

## 2018-01-12 DIAGNOSIS — E87.5 HYPERKALEMIA: ICD-10-CM

## 2018-01-12 DIAGNOSIS — I13.0 HYPERTENSIVE CARDIOVASCULAR-RENAL DISEASE, STAGE 1-4 OR UNSPECIFIED CHRONIC KIDNEY DISEASE, WITH HEART FAILURE: ICD-10-CM

## 2018-01-12 DIAGNOSIS — I50.43 ACUTE ON CHRONIC COMBINED SYSTOLIC AND DIASTOLIC HEART FAILURE: ICD-10-CM

## 2018-01-12 DIAGNOSIS — I50.9 ACUTE DECOMPENSATED HEART FAILURE: ICD-10-CM

## 2018-01-12 DIAGNOSIS — I50.42 CHRONIC COMBINED SYSTOLIC AND DIASTOLIC HEART FAILURE: ICD-10-CM

## 2018-01-12 DIAGNOSIS — Z95.810 BIVENTRICULAR IMPLANTABLE CARDIOVERTER-DEFIBRILLATOR IN SITU: ICD-10-CM

## 2018-01-12 DIAGNOSIS — I50.42 CHRONIC COMBINED SYSTOLIC AND DIASTOLIC CONGESTIVE HEART FAILURE: ICD-10-CM

## 2018-01-12 DIAGNOSIS — I42.0 DILATED CARDIOMYOPATHY: ICD-10-CM

## 2018-01-12 DIAGNOSIS — I10 HYPERTENSION, WELL CONTROLLED: ICD-10-CM

## 2018-01-12 DIAGNOSIS — I47.29 PVT (PAROXYSMAL VENTRICULAR TACHYCARDIA): ICD-10-CM

## 2018-01-12 DIAGNOSIS — N18.30 CKD (CHRONIC KIDNEY DISEASE), STAGE III: ICD-10-CM

## 2018-01-12 DIAGNOSIS — I50.9 CHF (CONGESTIVE HEART FAILURE): ICD-10-CM

## 2018-01-12 DIAGNOSIS — Z79.899 HIGH RISK MEDICATIONS (NOT ANTICOAGULANTS) LONG-TERM USE: ICD-10-CM

## 2018-01-12 DIAGNOSIS — I50.41 ACUTE COMBINED SYSTOLIC AND DIASTOLIC HEART FAILURE: Primary | ICD-10-CM

## 2018-01-12 LAB
ALBUMIN SERPL BCP-MCNC: 3.4 G/DL
ALP SERPL-CCNC: 90 U/L
ALT SERPL W/O P-5'-P-CCNC: 22 U/L
ANION GAP SERPL CALC-SCNC: 10 MMOL/L
AST SERPL-CCNC: 25 U/L
BASOPHILS # BLD AUTO: 0.01 K/UL
BASOPHILS NFR BLD: 0.2 %
BILIRUB SERPL-MCNC: 0.9 MG/DL
BNP SERPL-MCNC: 1742 PG/ML
BUN SERPL-MCNC: 30 MG/DL
CALCIUM SERPL-MCNC: 9.7 MG/DL
CHLORIDE SERPL-SCNC: 99 MMOL/L
CO2 SERPL-SCNC: 30 MMOL/L
CREAT SERPL-MCNC: 2 MG/DL
DIFFERENTIAL METHOD: ABNORMAL
EOSINOPHIL # BLD AUTO: 0.1 K/UL
EOSINOPHIL NFR BLD: 1.6 %
ERYTHROCYTE [DISTWIDTH] IN BLOOD BY AUTOMATED COUNT: 15.6 %
EST. GFR  (AFRICAN AMERICAN): 43.4 ML/MIN/1.73 M^2
EST. GFR  (NON AFRICAN AMERICAN): 37.5 ML/MIN/1.73 M^2
GLUCOSE SERPL-MCNC: 102 MG/DL
HCT VFR BLD AUTO: 42.8 %
HGB BLD-MCNC: 13.9 G/DL
IMM GRANULOCYTES # BLD AUTO: 0.02 K/UL
IMM GRANULOCYTES NFR BLD AUTO: 0.4 %
LYMPHOCYTES # BLD AUTO: 1.4 K/UL
LYMPHOCYTES NFR BLD: 25.2 %
MAGNESIUM SERPL-MCNC: 2 MG/DL
MCH RBC QN AUTO: 28.5 PG
MCHC RBC AUTO-ENTMCNC: 32.5 G/DL
MCV RBC AUTO: 88 FL
MONOCYTES # BLD AUTO: 0.6 K/UL
MONOCYTES NFR BLD: 10 %
NEUTROPHILS # BLD AUTO: 3.5 K/UL
NEUTROPHILS NFR BLD: 62.6 %
NRBC BLD-RTO: 0 /100 WBC
PHOSPHATE SERPL-MCNC: 3.4 MG/DL
PLATELET # BLD AUTO: 224 K/UL
PMV BLD AUTO: 10 FL
POTASSIUM SERPL-SCNC: 3.1 MMOL/L
PROT SERPL-MCNC: 7.3 G/DL
RBC # BLD AUTO: 4.87 M/UL
SODIUM SERPL-SCNC: 139 MMOL/L
WBC # BLD AUTO: 5.51 K/UL

## 2018-01-12 PROCEDURE — 83735 ASSAY OF MAGNESIUM: CPT | Mod: NTX

## 2018-01-12 PROCEDURE — 93010 ELECTROCARDIOGRAM REPORT: CPT | Mod: NTX,,, | Performed by: INTERNAL MEDICINE

## 2018-01-12 PROCEDURE — 80053 COMPREHEN METABOLIC PANEL: CPT | Mod: NTX

## 2018-01-12 PROCEDURE — 93005 ELECTROCARDIOGRAM TRACING: CPT | Mod: NTX

## 2018-01-12 PROCEDURE — A4216 STERILE WATER/SALINE, 10 ML: HCPCS | Mod: NTX | Performed by: INTERNAL MEDICINE

## 2018-01-12 PROCEDURE — 83880 ASSAY OF NATRIURETIC PEPTIDE: CPT | Mod: NTX

## 2018-01-12 PROCEDURE — 20600001 HC STEP DOWN PRIVATE ROOM: Mod: NTX

## 2018-01-12 PROCEDURE — 25000003 PHARM REV CODE 250: Mod: NTX | Performed by: INTERNAL MEDICINE

## 2018-01-12 PROCEDURE — 85025 COMPLETE CBC W/AUTO DIFF WBC: CPT | Mod: NTX

## 2018-01-12 PROCEDURE — 63600175 PHARM REV CODE 636 W HCPCS: Mod: NTX | Performed by: INTERNAL MEDICINE

## 2018-01-12 PROCEDURE — 84100 ASSAY OF PHOSPHORUS: CPT | Mod: NTX

## 2018-01-12 PROCEDURE — 36415 COLL VENOUS BLD VENIPUNCTURE: CPT | Mod: NTX

## 2018-01-12 RX ORDER — CARVEDILOL 3.12 MG/1
3.12 TABLET ORAL DAILY
Status: DISCONTINUED | OUTPATIENT
Start: 2018-01-12 | End: 2018-01-16

## 2018-01-12 RX ORDER — POTASSIUM CHLORIDE 20 MEQ/1
60 TABLET, EXTENDED RELEASE ORAL ONCE
Status: COMPLETED | OUTPATIENT
Start: 2018-01-12 | End: 2018-01-12

## 2018-01-12 RX ORDER — SODIUM CHLORIDE 0.9 % (FLUSH) 0.9 %
3 SYRINGE (ML) INJECTION EVERY 8 HOURS
Status: DISCONTINUED | OUTPATIENT
Start: 2018-01-12 | End: 2018-01-17 | Stop reason: HOSPADM

## 2018-01-12 RX ORDER — POTASSIUM CHLORIDE 20 MEQ/1
40 TABLET, EXTENDED RELEASE ORAL 2 TIMES DAILY
Status: DISCONTINUED | OUTPATIENT
Start: 2018-01-13 | End: 2018-01-17 | Stop reason: HOSPADM

## 2018-01-12 RX ORDER — POTASSIUM CHLORIDE 20 MEQ/1
40 TABLET, EXTENDED RELEASE ORAL 2 TIMES DAILY
Status: DISCONTINUED | OUTPATIENT
Start: 2018-01-12 | End: 2018-01-12

## 2018-01-12 RX ORDER — AMIODARONE HYDROCHLORIDE 200 MG/1
200 TABLET ORAL DAILY
Status: DISCONTINUED | OUTPATIENT
Start: 2018-01-13 | End: 2018-01-17 | Stop reason: HOSPADM

## 2018-01-12 RX ORDER — ALLOPURINOL 100 MG/1
100 TABLET ORAL 3 TIMES DAILY
Status: DISCONTINUED | OUTPATIENT
Start: 2018-01-12 | End: 2018-01-17 | Stop reason: HOSPADM

## 2018-01-12 RX ORDER — ASPIRIN 81 MG/1
81 TABLET ORAL DAILY
Status: DISCONTINUED | OUTPATIENT
Start: 2018-01-13 | End: 2018-01-17 | Stop reason: HOSPADM

## 2018-01-12 RX ORDER — FUROSEMIDE 10 MG/ML
80 INJECTION INTRAMUSCULAR; INTRAVENOUS ONCE
Status: COMPLETED | OUTPATIENT
Start: 2018-01-12 | End: 2018-01-12

## 2018-01-12 RX ORDER — ENOXAPARIN SODIUM 100 MG/ML
30 INJECTION SUBCUTANEOUS 2 TIMES DAILY
Status: DISCONTINUED | OUTPATIENT
Start: 2018-01-12 | End: 2018-01-13

## 2018-01-12 RX ORDER — FUROSEMIDE 10 MG/ML
20 INJECTION INTRAMUSCULAR; INTRAVENOUS ONCE
Status: DISCONTINUED | OUTPATIENT
Start: 2018-01-12 | End: 2018-01-12

## 2018-01-12 RX ORDER — LOSARTAN POTASSIUM 25 MG/1
25 TABLET ORAL DAILY
Status: DISCONTINUED | OUTPATIENT
Start: 2018-01-13 | End: 2018-01-16

## 2018-01-12 RX ORDER — VALSARTAN 40 MG/1
40 TABLET ORAL 2 TIMES DAILY
Status: DISCONTINUED | OUTPATIENT
Start: 2018-01-12 | End: 2018-01-12

## 2018-01-12 RX ADMIN — CARVEDILOL 3.12 MG: 3.12 TABLET, FILM COATED ORAL at 09:01

## 2018-01-12 RX ADMIN — ALLOPURINOL 100 MG: 100 TABLET ORAL at 09:01

## 2018-01-12 RX ADMIN — ENOXAPARIN SODIUM 30 MG: 100 INJECTION SUBCUTANEOUS at 09:01

## 2018-01-12 RX ADMIN — Medication 3 ML: at 10:01

## 2018-01-12 RX ADMIN — POTASSIUM CHLORIDE 60 MEQ: 1500 TABLET, EXTENDED RELEASE ORAL at 09:01

## 2018-01-12 RX ADMIN — FUROSEMIDE 80 MG: 10 INJECTION, SOLUTION INTRAMUSCULAR; INTRAVENOUS at 09:01

## 2018-01-12 RX ADMIN — FUROSEMIDE 10 MG/HR: 10 INJECTION, SOLUTION INTRAVENOUS at 09:01

## 2018-01-12 NOTE — PROGRESS NOTES
Received call from pt's wife stating pt agrees to be admitted this PM.  Admit called in.     Advised wife that prior auth determination from Medicaid has not yet been received.

## 2018-01-13 LAB
ABO + RH BLD: NORMAL
ALBUMIN SERPL BCP-MCNC: 3.6 G/DL
ALP SERPL-CCNC: 94 U/L
ALT SERPL W/O P-5'-P-CCNC: 21 U/L
ANION GAP SERPL CALC-SCNC: 11 MMOL/L
ANION GAP SERPL CALC-SCNC: 9 MMOL/L
AST SERPL-CCNC: 24 U/L
BILIRUB SERPL-MCNC: 1 MG/DL
BLD GP AB SCN CELLS X3 SERPL QL: NORMAL
BUN SERPL-MCNC: 32 MG/DL
BUN SERPL-MCNC: 33 MG/DL
CALCIUM SERPL-MCNC: 10.2 MG/DL
CALCIUM SERPL-MCNC: 9.9 MG/DL
CHLORIDE SERPL-SCNC: 97 MMOL/L
CHLORIDE SERPL-SCNC: 99 MMOL/L
CO2 SERPL-SCNC: 28 MMOL/L
CO2 SERPL-SCNC: 32 MMOL/L
CREAT SERPL-MCNC: 2 MG/DL
CREAT SERPL-MCNC: 2.1 MG/DL
EST. GFR  (AFRICAN AMERICAN): 40.9 ML/MIN/1.73 M^2
EST. GFR  (AFRICAN AMERICAN): 43.4 ML/MIN/1.73 M^2
EST. GFR  (NON AFRICAN AMERICAN): 35.4 ML/MIN/1.73 M^2
EST. GFR  (NON AFRICAN AMERICAN): 37.5 ML/MIN/1.73 M^2
GLUCOSE SERPL-MCNC: 108 MG/DL
GLUCOSE SERPL-MCNC: 87 MG/DL
POTASSIUM SERPL-SCNC: 3.5 MMOL/L
POTASSIUM SERPL-SCNC: 3.5 MMOL/L
PROT SERPL-MCNC: 7.8 G/DL
SODIUM SERPL-SCNC: 138 MMOL/L
SODIUM SERPL-SCNC: 138 MMOL/L

## 2018-01-13 PROCEDURE — 36415 COLL VENOUS BLD VENIPUNCTURE: CPT | Mod: NTX

## 2018-01-13 PROCEDURE — 20600001 HC STEP DOWN PRIVATE ROOM: Mod: NTX

## 2018-01-13 PROCEDURE — 93306 TTE W/DOPPLER COMPLETE: CPT | Mod: NTX

## 2018-01-13 PROCEDURE — 80048 BASIC METABOLIC PNL TOTAL CA: CPT | Mod: NTX

## 2018-01-13 PROCEDURE — 63600175 PHARM REV CODE 636 W HCPCS: Mod: NTX | Performed by: INTERNAL MEDICINE

## 2018-01-13 PROCEDURE — 86901 BLOOD TYPING SEROLOGIC RH(D): CPT | Mod: NTX

## 2018-01-13 PROCEDURE — 25000003 PHARM REV CODE 250: Mod: NTX | Performed by: INTERNAL MEDICINE

## 2018-01-13 PROCEDURE — 80053 COMPREHEN METABOLIC PANEL: CPT | Mod: NTX

## 2018-01-13 PROCEDURE — 99223 1ST HOSP IP/OBS HIGH 75: CPT | Mod: NTX,,, | Performed by: INTERNAL MEDICINE

## 2018-01-13 PROCEDURE — A4216 STERILE WATER/SALINE, 10 ML: HCPCS | Mod: NTX | Performed by: INTERNAL MEDICINE

## 2018-01-13 PROCEDURE — 93306 TTE W/DOPPLER COMPLETE: CPT | Mod: 26,NTX,, | Performed by: INTERNAL MEDICINE

## 2018-01-13 RX ADMIN — CARVEDILOL 3.12 MG: 3.12 TABLET, FILM COATED ORAL at 08:01

## 2018-01-13 RX ADMIN — POTASSIUM CHLORIDE 40 MEQ: 1500 TABLET, EXTENDED RELEASE ORAL at 09:01

## 2018-01-13 RX ADMIN — ALLOPURINOL 100 MG: 100 TABLET ORAL at 09:01

## 2018-01-13 RX ADMIN — POTASSIUM CHLORIDE 40 MEQ: 1500 TABLET, EXTENDED RELEASE ORAL at 08:01

## 2018-01-13 RX ADMIN — ALLOPURINOL 100 MG: 100 TABLET ORAL at 05:01

## 2018-01-13 RX ADMIN — Medication 3 ML: at 10:01

## 2018-01-13 RX ADMIN — Medication 3 ML: at 02:01

## 2018-01-13 RX ADMIN — ASPIRIN 81 MG: 81 TABLET, COATED ORAL at 08:01

## 2018-01-13 RX ADMIN — LOSARTAN POTASSIUM 25 MG: 25 TABLET, FILM COATED ORAL at 08:01

## 2018-01-13 RX ADMIN — ENOXAPARIN SODIUM 30 MG: 100 INJECTION SUBCUTANEOUS at 08:01

## 2018-01-13 RX ADMIN — AMIODARONE HYDROCHLORIDE 200 MG: 200 TABLET ORAL at 08:01

## 2018-01-13 RX ADMIN — Medication 3 ML: at 06:01

## 2018-01-13 RX ADMIN — ALLOPURINOL 100 MG: 100 TABLET ORAL at 02:01

## 2018-01-13 NOTE — ASSESSMENT & PLAN NOTE
- NICM with Stage C/D with NYHA 2-3 symptoms   - Wet / Warm on exam   - TTE repeated this AM. Await results.   - Continue lasix gtt (net negative 1.5 Ls in last 12 hours)   - Strict I/Os and daily weights.   - GDMT: Continue carvedilol and losartan   - Started pathway.   - Await CT surgery input.   - RHC once euvolemic

## 2018-01-13 NOTE — ASSESSMENT & PLAN NOTE
- ICD placed in 2014 and prior interrogation shows proper working  - continue amiodarone and BB as current  - electrolytes maintenance at range of K> 4 and Mg >2

## 2018-01-13 NOTE — H&P
[facility]  Heart Transplant  H&P    Patient Name: Tim Richards  MRN: [id number]  Admission Date: 1/12/2018  Attending Physician: Casper Gutierrez MD  Primary Care Provider: Ja Méndez MD  Principal Problem:<principal problem not specified>    Subjective:     History of Present Illness:  52 y.o. Male with CHF stage D, S/p, CRT and  ICD secondary to VT and multiple interrogation  There after showing Vt, hx of alcohol use and medical therapy noncompliance presenting from home after being seen in clinic 3 days ago for advanced option wallace. During that visit, his renal function was found to be at baseline of 2 while BNP was also significantly elevated at 2012.  His LFTs were also notable for  Mild elevation of AST and ALT which was slightly better from previous levels. He has been following with Dr. Doe at [facility] for CHF since 2011 when he was diagnosed with dialted cardiomyopathy and low EF of 25% after a cardiac cath that revealed non obstructive coronaries. He had an ICD placed on 10/31/2014 for episodic VT . Last  Echo on file was three months ago and his EF was 10% with moderate mitral valve insufficient. Patient was referred to [facility] for LVAD/OHT workup but preferred to come in today after taking care of some social issues. He report a lot of fatigue at home and at work although he is mostly seated at workplace. His appetite is also decreased without weight loss, leg edema, constipation, PND or orthopnea. He also denies ICD misfiring, chest pain or palpitation. There is a order for colonoscopy in file in 2017 but there is no evidence of it having been done.          Past Medical History:   Diagnosis Date    CHF (congestive heart failure)     Dilated cardiomyopathy 1/10/2018    Disorder of kidney and ureter     CKD    Gout     HTN (hypertension)     Ventricular tachycardia (paroxysmal)        Past Surgical History:   Procedure Laterality Date    CARDIAC CATHETERIZATION   Dec. 2012    CARDIAC DEFIBRILLATOR PLACEMENT Left     CRRT-D       Review of patient's allergies indicates:   Allergen Reactions    Lisinopril Anaphylaxis       Current Facility-Administered Medications   Medication    allopurinol tablet 100 mg    [START ON 1/13/2018] amiodarone tablet 200 mg    [START ON 1/13/2018] aspirin EC tablet 81 mg    carvedilol tablet 3.125 mg    enoxaparin injection 30 mg    furosemide (LASIX) 250 mg in sodium chloride 0.9 % 250 mL infusion (non-titrating)    [START ON 1/13/2018] losartan tablet 25 mg    [START ON 1/13/2018] potassium chloride SA CR tablet 40 mEq    sodium chloride 0.9% flush 3 mL     Family History     Problem Relation (Age of Onset)    Cancer Sister (54)    Coronary artery disease Father    Diabetes Father    Heart disease Father    Hypertension Father    No Known Problems Mother, Brother        Social History Main Topics    Smoking status: Current Every Day Smoker     Packs/day: 1.00     Years: 31.00     Types: Cigarettes    Smokeless tobacco: Never Used      Comment: pt is quiting on his own - pt stated not qualified for program; 2/16 pt  quit on his own    Alcohol use 0.0 oz/week      Comment: one fifth of gin or rum/week    Drug use: No    Sexual activity: Yes     Partners: Female     Birth control/ protection: None      Comment: 10/5/17  with same partner 7 years      Review of Systems   Constitutional: Positive for activity change and fatigue. Negative for unexpected weight change.   Respiratory: Negative.    Cardiovascular: Negative for chest pain, palpitations and leg swelling.   Gastrointestinal: Negative for abdominal distention.   Skin: Positive for color change (Discoloration to the shin bilaterally).   Neurological: Negative for dizziness, seizures and syncope.     Objective:     Vital Signs (Most Recent):  Temp: 98.5 °F (36.9 °C) (01/12/18 1932)  Pulse: 77 (01/12/18 2023)  Resp: 20 (01/12/18 1932)  BP: 125/76 (01/12/18 1932)  SpO2: 98  % (01/12/18 1932) Vital Signs (24h Range):  Temp:  [98.5 °F (36.9 °C)] 98.5 °F (36.9 °C)  Pulse:  [77-80] 77  Resp:  [20] 20  SpO2:  [98 %] 98 %  BP: (125)/(76) 125/76     Patient Vitals for the past 72 hrs (Last 3 readings):   Weight   01/12/18 1932 118.6 kg (261 lb 5.7 oz)     Body mass index is 34.48 kg/m².    No intake or output data in the 24 hours ending 01/12/18 2145    Physical Exam   Constitutional: He is oriented to person, place, and time. He appears well-developed.   Eyes: No scleral icterus.   Neck: JVD: +15mmHG.   Cardiovascular: Normal rate and regular rhythm.  Exam reveals gallop (+ s3).    Pulmonary/Chest: Effort normal and breath sounds normal. He has no rales (+ bibasilar rales).   Abdominal: Soft. Bowel sounds are normal.   + hepatojagular reflext   Musculoskeletal: He exhibits no edema or tenderness.   Neurological: He is alert and oriented to person, place, and time.   Skin: Skin is warm and dry.   Psychiatric: He has a normal mood and affect.   Nursing note and vitals reviewed.      Significant Labs:  CBC:    Recent Labs  Lab 01/10/18  1355 01/12/18 2133   WBC 6.75 5.51   RBC 4.94 4.87   HGB 13.9* 13.9*   HCT 43.7 42.8    224   MCV 89 88   MCH 28.1 28.5   MCHC 31.8* 32.5     BNP:    Recent Labs  Lab 01/10/18  1355   BNP 2,012*     CMP:    Recent Labs  Lab 01/10/18  1355      CALCIUM 9.6   ALBUMIN 3.6   PROT 7.6      K 3.7   CO2 33*   CL 94*   BUN 28*   CREATININE 2.0*   ALKPHOS 85   ALT 21   AST 23   BILITOT 1.5*      Coagulation:   No results for input(s): PT, INR, APTT in the last 168 hours.  LDH:  No results for input(s): LDH in the last 72 hours.  Microbiology:  Microbiology Results (last 7 days)     ** No results found for the last 168 hours. **          I have reviewed all pertinent labs within the past 24 hours.    Diagnostic Results:  CXR: No results found in the last 24 hours.    Assessment/Plan:     Acute combined systolic and diastolic heart failure    - Warm on  exam  - secondary to dilated cardiomyopathy. Prior cath negative for CAD  - last echo on file 10/2017, EF was 10%  - Fluid overload on exam and labs showing BNP of 2012  - Start lasix 80mg push and 10mg/hr gtt  - replace electrolytes and repeat BNP in am  - I/O, weight monitoring daily and diet  - Possible RHC during stay once more euvolemic  -Start Advanced option wallace including CT series  - Pathway to be initiated tomorrow          Hypertension, well controlled    - report being on losartan and coreg daily  - declined BIDIL earlier due to use of cialis          PVT (paroxysmal ventricular tachycardia)    - ICD placed in 2014 and prior interrogation shows proper working  - continue amiodarone and BB as current  - electrolytes maintenance at range of K> 4 and Mg >2        CKD (chronic kidney disease), stage III    - at baseline and possibly pre- renal  - monitor and avoid nephrotoxic agents unless necessary          Discussed plan of care with Dr. Gutierrez the attending on call    Fernando Tirado MD  Heart Transplant  [facility]

## 2018-01-13 NOTE — SUBJECTIVE & OBJECTIVE
Interval History: No acute issues overnight. Diuresing well.     Continuous Infusions:   furosemide (LASIX) 1 mg/mL infusion (non-titrating) 10 mg/hr (01/12/18 2145)     Scheduled Meds:   allopurinol  100 mg Oral TID    amiodarone  200 mg Oral Daily    aspirin  81 mg Oral Daily    carvedilol  3.125 mg Oral Daily    enoxaparin  30 mg Subcutaneous BID    losartan  25 mg Oral Daily    potassium chloride SA  40 mEq Oral BID    sodium chloride 0.9%  3 mL Intravenous Q8H     PRN Meds:    Review of patient's allergies indicates:   Allergen Reactions    Lisinopril Anaphylaxis     Objective:     Vital Signs (Most Recent):  Temp: 98.4 °F (36.9 °C) (01/13/18 1112)  Pulse: 74 (01/13/18 1115)  Resp: 18 (01/13/18 1112)  BP: 92/65 (01/13/18 1112)  SpO2: 98 % (01/13/18 1112) Vital Signs (24h Range):  Temp:  [96.3 °F (35.7 °C)-98.5 °F (36.9 °C)] 98.4 °F (36.9 °C)  Pulse:  [70-85] 74  Resp:  [17-20] 18  SpO2:  [90 %-98 %] 98 %  BP: ()/(58-76) 92/65     Patient Vitals for the past 72 hrs (Last 3 readings):   Weight   01/13/18 0700 117.1 kg (258 lb 2.5 oz)   01/12/18 1932 118.6 kg (261 lb 5.7 oz)     Body mass index is 34.06 kg/m².      Intake/Output Summary (Last 24 hours) at 01/13/18 1356  Last data filed at 01/13/18 1300   Gross per 24 hour   Intake              540 ml   Output             2850 ml   Net            -2310 ml     Physical Exam  Constitutional: AAOx2  Eyes: No scleral icterus. PERRL  Neck: Supple. Elevated JVD up to jaw. + hepatojagular reflex  Cardiovascular: Normal rate and regular rhythm.    Pulmonary/Chest: Effort normal. Scant bibasilar rales   Abdominal: Soft. Bowel sounds are normal.   Musculoskeletal: Equal B/L edema   Neurological: No focal deficits.   Skin: Skin is warm and dry.   Psychiatric: He has a normal mood and affect.   Nursing note and vitals reviewed.    Significant Labs:  CBC:    Recent Labs  Lab 01/10/18  1355 01/12/18  2133   WBC 6.75 5.51   RBC 4.94 4.87   HGB 13.9* 13.9*   HCT  43.7 42.8    224   MCV 89 88   MCH 28.1 28.5   MCHC 31.8* 32.5     BNP:    Recent Labs  Lab 01/10/18  1355 01/12/18 2133   BNP 2,012* 1,742*     CMP:    Recent Labs  Lab 01/10/18  1355 01/12/18  2133 01/13/18  0328 01/13/18  1237    102 87 108   CALCIUM 9.6 9.7 9.9 10.2   ALBUMIN 3.6 3.4*  --  3.6   PROT 7.6 7.3  --  7.8    139 138 138   K 3.7 3.1* 3.5 3.5   CO2 33* 30* 28 32*   CL 94* 99 99 97   BUN 28* 30* 33* 32*   CREATININE 2.0* 2.0* 2.0* 2.1*   ALKPHOS 85 90  --  94   ALT 21 22  --  21   AST 23 25  --  24   BILITOT 1.5* 0.9  --  1.0      I have reviewed all pertinent labs within the past 24 hours.    Estimated Creatinine Clearance: 55.8 mL/min (based on SCr of 2.1 mg/dL (H)).    Diagnostic Results:  I have reviewed all pertinent imaging results/findings within the past 24 hours.

## 2018-01-13 NOTE — PLAN OF CARE
Problem: Patient Care Overview  Goal: Plan of Care Review  I went over the plan of care with the patient. The patient is independent. Patient went for a cat scan today. Patient remained free of falls and skin breakdown.

## 2018-01-13 NOTE — HPI
52 y.o. Male with CHF stage D, S/p, CRT and  ICD secondary to VT and multiple interrogation  There after showing Vt, hx of alcohol use and medical therapy noncompliance presenting from home after being seen in clinic 3 days ago for advanced option wallace. During that visit, his renal function was found to be at baseline of 2 while BNP was also significantly elevated at 2012.  His LFTs were also notable for  Mild elevation of AST and ALT which was slightly better from previous levels. He has been following with Dr. Doe at [facility] for CHF since 2011 when he was diagnosed with dialted cardiomyopathy and low EF of 25% after a cardiac cath that revealed non obstructive coronaries. He had an ICD placed on 10/31/2014 for episodic VT . Last  Echo on file was three months ago and his EF was 10% with moderate mitral valve insufficient. Patient was referred to [facility] for LVAD/OHT workup but preferred to come in today after taking care of some social issues. He report a lot of fatigue at home and at work although he is mostly seated at workplace. His appetite is also decreased without weight loss, leg edema, constipation, PND or orthopnea. He also denies ICD misfiring, chest pain or palpitation. There is a order for colonoscopy in file in 2017 but there is no evidence of it having been done.

## 2018-01-13 NOTE — PROGRESS NOTES
[facility]  Heart Transplant  Progress Note    Patient Name: Tim Richards  MRN: [id number]  Admission Date: 1/12/2018  Hospital Length of Stay: 1 days  Attending Physician: Casper Gutierrez MD  Primary Care Provider: Ja Méndez MD  Principal Problem:Acute combined systolic and diastolic heart failure    Subjective:     Interval History: No acute issues overnight. Diuresing well.     Continuous Infusions:   furosemide (LASIX) 1 mg/mL infusion (non-titrating) 10 mg/hr (01/12/18 2145)     Scheduled Meds:   allopurinol  100 mg Oral TID    amiodarone  200 mg Oral Daily    aspirin  81 mg Oral Daily    carvedilol  3.125 mg Oral Daily    enoxaparin  30 mg Subcutaneous BID    losartan  25 mg Oral Daily    potassium chloride SA  40 mEq Oral BID    sodium chloride 0.9%  3 mL Intravenous Q8H     PRN Meds:    Review of patient's allergies indicates:   Allergen Reactions    Lisinopril Anaphylaxis     Objective:     Vital Signs (Most Recent):  Temp: 98.4 °F (36.9 °C) (01/13/18 1112)  Pulse: 74 (01/13/18 1115)  Resp: 18 (01/13/18 1112)  BP: 92/65 (01/13/18 1112)  SpO2: 98 % (01/13/18 1112) Vital Signs (24h Range):  Temp:  [96.3 °F (35.7 °C)-98.5 °F (36.9 °C)] 98.4 °F (36.9 °C)  Pulse:  [70-85] 74  Resp:  [17-20] 18  SpO2:  [90 %-98 %] 98 %  BP: ()/(58-76) 92/65     Patient Vitals for the past 72 hrs (Last 3 readings):   Weight   01/13/18 0700 117.1 kg (258 lb 2.5 oz)   01/12/18 1932 118.6 kg (261 lb 5.7 oz)     Body mass index is 34.06 kg/m².      Intake/Output Summary (Last 24 hours) at 01/13/18 1356  Last data filed at 01/13/18 1300   Gross per 24 hour   Intake              540 ml   Output             2850 ml   Net            -2310 ml     Physical Exam  Constitutional: AAOx2  Eyes: No scleral icterus. PERRL  Neck: Supple. Elevated JVD up to jaw. + hepatojagular reflex  Cardiovascular: Normal rate and regular rhythm.    Pulmonary/Chest: Effort normal. Scant bibasilar rales    Abdominal: Soft. Bowel sounds are normal.   Musculoskeletal: Equal B/L edema   Neurological: No focal deficits.   Skin: Skin is warm and dry.   Psychiatric: He has a normal mood and affect.   Nursing note and vitals reviewed.    Significant Labs:  CBC:    Recent Labs  Lab 01/10/18  1355 01/12/18 2133   WBC 6.75 5.51   RBC 4.94 4.87   HGB 13.9* 13.9*   HCT 43.7 42.8    224   MCV 89 88   MCH 28.1 28.5   MCHC 31.8* 32.5     BNP:    Recent Labs  Lab 01/10/18  1355 01/12/18 2133   BNP 2,012* 1,742*     CMP:    Recent Labs  Lab 01/10/18  1355 01/12/18 2133 01/13/18  0328 01/13/18  1237    102 87 108   CALCIUM 9.6 9.7 9.9 10.2   ALBUMIN 3.6 3.4*  --  3.6   PROT 7.6 7.3  --  7.8    139 138 138   K 3.7 3.1* 3.5 3.5   CO2 33* 30* 28 32*   CL 94* 99 99 97   BUN 28* 30* 33* 32*   CREATININE 2.0* 2.0* 2.0* 2.1*   ALKPHOS 85 90  --  94   ALT 21 22  --  21   AST 23 25  --  24   BILITOT 1.5* 0.9  --  1.0      I have reviewed all pertinent labs within the past 24 hours.    Estimated Creatinine Clearance: 55.8 mL/min (based on SCr of 2.1 mg/dL (H)).    Diagnostic Results:  I have reviewed all pertinent imaging results/findings within the past 24 hours.    Assessment and Plan:     52 y.o. Male with CHF stage D, S/p, CRT and  ICD secondary to VT and multiple interrogation  There after showing Vt, hx of alcohol use and medical therapy noncompliance presenting from home after being seen in clinic 3 days ago for advanced option wallace. During that visit, his renal function was found to be at baseline of 2 while BNP was also significantly elevated at 2012.  His LFTs were also notable for  Mild elevation of AST and ALT which was slightly better from previous levels. He has been following with [place]. Doe [place] [facility] [place] since 2011 when he was diagnosed with dialted cardiomyopathy and low EF of 25% after a cardiac cath that revealed non obstructive coronaries. He had an ICD placed on 10/31/2014 for episodic VT . Last   Echo on file was three months ago and his EF was 10% with moderate mitral valve insufficient. Patient was referred to [facility] for LVAD/OHT workup but preferred to come in today after taking care of some social issues. He report a lot of fatigue at home and at work although he is mostly seated at workplace. His appetite is also decreased without weight loss, leg edema, constipation, PND or orthopnea. He also denies ICD misfiring, chest pain or palpitation. There is a order for colonoscopy in file in 2017 but there is no evidence of it having been done.          * Acute combined systolic and diastolic heart failure    - NICM with Stage C/D with NYHA 2-3 symptoms   - Wet / Warm on exam   - TTE repeated this AM. Await results.   - Continue lasix gtt (net negative 1.5 Ls in last 12 hours)   - Strict I/Os and daily weights.   - GDMT: Continue carvedilol and losartan   - Started pathway.   - Await CT surgery input.   - RHC once euvolemic           CKD (chronic kidney disease), stage III    - Cr 2.0 at baseline   - Daily BMP and avoid nephrotoxic agents         PVT (paroxysmal ventricular tachycardia)    - ICD placed in 2014  - Continue amiodarone  - Goal of K> 4 and Mg >2            Hilario thomas, DO  Heart Transplant  [facility]

## 2018-01-13 NOTE — ASSESSMENT & PLAN NOTE
- Warm on exam  - secondary to dilated cardiomyopathy. Prior cath negative for CAD  - last echo on file 10/2017, EF was 10%  - Fluid overload on exam and labs showing BNP of 2012  - Start lasix 80mg push and 10mg/hr gtt  - replace electrolytes and repeat BNP in am  - I/O, weight monitoring daily and diet  - Possible RHC during stay once more euvolemic  -Start Advanced option wallace including CT series  - Pathway to be initiated tomorrow

## 2018-01-13 NOTE — PLAN OF CARE
Problem: Patient Care Overview  Goal: Plan of Care Review  Outcome: Ongoing (interventions implemented as appropriate)  Plan of care discussed with pt. Lasix gtt initated. Lasix IVP x1 given prior to gtt. Monitoring intake and output. Plan to begin pathway for advance options tomorrow and 2d echo. RHC once euvolemic.  Paced on tele. HR 70s. VSS & NADN. Pt denies CP, SOB, or pain/discomfort at this time.Pt free of injuries this shift.  All questions addressed.  Will continue to monitor.

## 2018-01-13 NOTE — SUBJECTIVE & OBJECTIVE
Past Medical History:   Diagnosis Date    CHF (congestive heart failure)     Dilated cardiomyopathy 1/10/2018    Disorder of kidney and ureter     CKD    Gout     HTN (hypertension)     Ventricular tachycardia (paroxysmal)        Past Surgical History:   Procedure Laterality Date    CARDIAC CATHETERIZATION  Dec. 2012    CARDIAC DEFIBRILLATOR PLACEMENT Left     CRRT-D       Review of patient's allergies indicates:   Allergen Reactions    Lisinopril Anaphylaxis       Current Facility-Administered Medications   Medication    allopurinol tablet 100 mg    [START ON 1/13/2018] amiodarone tablet 200 mg    [START ON 1/13/2018] aspirin EC tablet 81 mg    carvedilol tablet 3.125 mg    enoxaparin injection 30 mg    furosemide (LASIX) 250 mg in sodium chloride 0.9 % 250 mL infusion (non-titrating)    [START ON 1/13/2018] losartan tablet 25 mg    [START ON 1/13/2018] potassium chloride SA CR tablet 40 mEq    sodium chloride 0.9% flush 3 mL     Family History     Problem Relation (Age of Onset)    Cancer Sister (54)    Coronary artery disease Father    Diabetes Father    Heart disease Father    Hypertension Father    No Known Problems Mother, Brother        Social History Main Topics    Smoking status: Current Every Day Smoker     Packs/day: 1.00     Years: 31.00     Types: Cigarettes    Smokeless tobacco: Never Used      Comment: pt is quiting on his own - pt stated not qualified for program; 2/16 pt  quit on his own    Alcohol use 0.0 oz/week      Comment: one fifth of gin or rum/week    Drug use: No    Sexual activity: Yes     Partners: Female     Birth control/ protection: None      Comment: 10/5/17  with same partner 7 years      Review of Systems   Constitutional: Positive for activity change and fatigue. Negative for unexpected weight change.   Respiratory: Negative.    Cardiovascular: Negative for chest pain, palpitations and leg swelling.   Gastrointestinal: Negative for abdominal  distention.   Skin: Positive for color change (Discoloration to the shin bilaterally).   Neurological: Negative for dizziness, seizures and syncope.     Objective:     Vital Signs (Most Recent):  Temp: 98.5 °F (36.9 °C) (01/12/18 1932)  Pulse: 77 (01/12/18 2023)  Resp: 20 (01/12/18 1932)  BP: 125/76 (01/12/18 1932)  SpO2: 98 % (01/12/18 1932) Vital Signs (24h Range):  Temp:  [98.5 °F (36.9 °C)] 98.5 °F (36.9 °C)  Pulse:  [77-80] 77  Resp:  [20] 20  SpO2:  [98 %] 98 %  BP: (125)/(76) 125/76     Patient Vitals for the past 72 hrs (Last 3 readings):   Weight   01/12/18 1932 118.6 kg (261 lb 5.7 oz)     Body mass index is 34.48 kg/m².    No intake or output data in the 24 hours ending 01/12/18 2145    Physical Exam   Constitutional: He is oriented to person, place, and time. He appears well-developed.   Eyes: No scleral icterus.   Neck: JVD: +15mmHG.   Cardiovascular: Normal rate and regular rhythm.  Exam reveals gallop (+ s3).    Pulmonary/Chest: Effort normal and breath sounds normal. He has no rales (+ bibasilar rales).   Abdominal: Soft. Bowel sounds are normal.   + hepatojagular reflext   Musculoskeletal: He exhibits no edema or tenderness.   Neurological: He is alert and oriented to person, place, and time.   Skin: Skin is warm and dry.   Psychiatric: He has a normal mood and affect.   Nursing note and vitals reviewed.      Significant Labs:  CBC:    Recent Labs  Lab 01/10/18  1355 01/12/18 2133   WBC 6.75 5.51   RBC 4.94 4.87   HGB 13.9* 13.9*   HCT 43.7 42.8    224   MCV 89 88   MCH 28.1 28.5   MCHC 31.8* 32.5     BNP:    Recent Labs  Lab 01/10/18  1355   BNP 2,012*     CMP:    Recent Labs  Lab 01/10/18  1355      CALCIUM 9.6   ALBUMIN 3.6   PROT 7.6      K 3.7   CO2 33*   CL 94*   BUN 28*   CREATININE 2.0*   ALKPHOS 85   ALT 21   AST 23   BILITOT 1.5*      Coagulation:   No results for input(s): PT, INR, APTT in the last 168 hours.  LDH:  No results for input(s): LDH in the last 72  hours.  Microbiology:  Microbiology Results (last 7 days)     ** No results found for the last 168 hours. **          I have reviewed all pertinent labs within the past 24 hours.    Diagnostic Results:  CXR: No results found in the last 24 hours.

## 2018-01-13 NOTE — PROGRESS NOTES
Patient direct admit to CSU room 358. Patient arrived to floor with spouse, no evidence of distress. Patient placed on tele. Vital signs obtained. Patient voices no complaints at this time. Bed in lowest position, locked, SR up x2, call bell in reach. Will continue to monitor patient. Dr. Tirado notified of patient arrival.

## 2018-01-14 LAB
ANION GAP SERPL CALC-SCNC: 11 MMOL/L
AORTIC VALVE REGURGITATION: ABNORMAL
BUN SERPL-MCNC: 33 MG/DL
CALCIUM SERPL-MCNC: 9.7 MG/DL
CHLORIDE SERPL-SCNC: 98 MMOL/L
CO2 SERPL-SCNC: 31 MMOL/L
CREAT SERPL-MCNC: 1.8 MG/DL
EST. GFR  (AFRICAN AMERICAN): 49.3 ML/MIN/1.73 M^2
EST. GFR  (NON AFRICAN AMERICAN): 42.6 ML/MIN/1.73 M^2
ESTIMATED PA SYSTOLIC PRESSURE: 47.44
GLUCOSE SERPL-MCNC: 92 MG/DL
MAGNESIUM SERPL-MCNC: 2 MG/DL
MITRAL VALVE REGURGITATION: ABNORMAL
POTASSIUM SERPL-SCNC: 3.1 MMOL/L
RETIRED EF AND QEF - SEE NOTES: 10 (ref 55–65)
SODIUM SERPL-SCNC: 140 MMOL/L
TRICUSPID VALVE REGURGITATION: ABNORMAL

## 2018-01-14 PROCEDURE — 25000003 PHARM REV CODE 250: Mod: NTX | Performed by: INTERNAL MEDICINE

## 2018-01-14 PROCEDURE — 83735 ASSAY OF MAGNESIUM: CPT | Mod: NTX

## 2018-01-14 PROCEDURE — 80048 BASIC METABOLIC PNL TOTAL CA: CPT | Mod: NTX

## 2018-01-14 PROCEDURE — 99233 SBSQ HOSP IP/OBS HIGH 50: CPT | Mod: NTX,,, | Performed by: INTERNAL MEDICINE

## 2018-01-14 PROCEDURE — 20600001 HC STEP DOWN PRIVATE ROOM: Mod: NTX

## 2018-01-14 PROCEDURE — 63600175 PHARM REV CODE 636 W HCPCS: Mod: NTX | Performed by: INTERNAL MEDICINE

## 2018-01-14 PROCEDURE — A4216 STERILE WATER/SALINE, 10 ML: HCPCS | Mod: NTX | Performed by: INTERNAL MEDICINE

## 2018-01-14 RX ORDER — POTASSIUM CHLORIDE 20 MEQ/1
40 TABLET, EXTENDED RELEASE ORAL ONCE
Status: COMPLETED | OUTPATIENT
Start: 2018-01-14 | End: 2018-01-14

## 2018-01-14 RX ADMIN — Medication 3 ML: at 06:01

## 2018-01-14 RX ADMIN — CARVEDILOL 3.12 MG: 3.12 TABLET, FILM COATED ORAL at 08:01

## 2018-01-14 RX ADMIN — ALLOPURINOL 100 MG: 100 TABLET ORAL at 09:01

## 2018-01-14 RX ADMIN — Medication 3 ML: at 09:01

## 2018-01-14 RX ADMIN — LOSARTAN POTASSIUM 25 MG: 25 TABLET, FILM COATED ORAL at 08:01

## 2018-01-14 RX ADMIN — POTASSIUM CHLORIDE 40 MEQ: 1500 TABLET, EXTENDED RELEASE ORAL at 09:01

## 2018-01-14 RX ADMIN — AMIODARONE HYDROCHLORIDE 200 MG: 200 TABLET ORAL at 08:01

## 2018-01-14 RX ADMIN — FUROSEMIDE 10 MG/HR: 10 INJECTION, SOLUTION INTRAVENOUS at 05:01

## 2018-01-14 RX ADMIN — Medication 3 ML: at 02:01

## 2018-01-14 RX ADMIN — ALLOPURINOL 100 MG: 100 TABLET ORAL at 05:01

## 2018-01-14 RX ADMIN — ASPIRIN 81 MG: 81 TABLET, COATED ORAL at 08:01

## 2018-01-14 RX ADMIN — POTASSIUM CHLORIDE 40 MEQ: 1500 TABLET, EXTENDED RELEASE ORAL at 01:01

## 2018-01-14 RX ADMIN — ALLOPURINOL 100 MG: 100 TABLET ORAL at 01:01

## 2018-01-14 RX ADMIN — POTASSIUM CHLORIDE 40 MEQ: 1500 TABLET, EXTENDED RELEASE ORAL at 08:01

## 2018-01-14 NOTE — PROGRESS NOTES
[facility]  Heart Transplant  Progress Note    Patient Name: Tim Richards  MRN: [id number]  Admission Date: 1/12/2018  Hospital Length of Stay: 2 days  Attending Physician: Casper Gutierrez MD  Primary Care Provider: Ja Méndez MD  Principal Problem:Acute combined systolic and diastolic heart failure    Subjective:     Interval History: Continues to do well. NO acute issues. -2.4Ls in 24 hours.     Continuous Infusions:   furosemide (LASIX) 1 mg/mL infusion (non-titrating) 10 mg/hr (01/12/18 2145)     Scheduled Meds:   allopurinol  100 mg Oral TID    amiodarone  200 mg Oral Daily    aspirin  81 mg Oral Daily    carvedilol  3.125 mg Oral Daily    losartan  25 mg Oral Daily    potassium chloride SA  40 mEq Oral BID    sodium chloride 0.9%  3 mL Intravenous Q8H     PRN Meds:    Review of patient's allergies indicates:   Allergen Reactions    Lisinopril Anaphylaxis     Objective:     Vital Signs (Most Recent):  Temp: 98.5 °F (36.9 °C) (01/14/18 1145)  Pulse: 72 (01/14/18 1145)  Resp: 18 (01/14/18 1145)  BP: 101/65 (01/14/18 1145)  SpO2: 96 % (01/14/18 1145) Vital Signs (24h Range):  Temp:  [98.1 °F (36.7 °C)-98.5 °F (36.9 °C)] 98.5 °F (36.9 °C)  Pulse:  [67-77] 72  Resp:  [17-18] 18  SpO2:  [95 %-97 %] 96 %  BP: ()/(59-66) 101/65     Patient Vitals for the past 72 hrs (Last 3 readings):   Weight   01/14/18 0700 115.6 kg (254 lb 15.4 oz)   01/13/18 0700 117.1 kg (258 lb 2.5 oz)   01/12/18 1932 118.6 kg (261 lb 5.7 oz)     Body mass index is 33.64 kg/m².      Intake/Output Summary (Last 24 hours) at 01/14/18 1155  Last data filed at 01/14/18 0635   Gross per 24 hour   Intake           1627.5 ml   Output             3900 ml   Net          -2272.5 ml     Physical Exam    Constitutional: AAOx2  Eyes: No scleral icterus. PERRL  Neck: Supple. Elevated JVD up to jaw. + hepatojagular reflex  Cardiovascular: Normal rate and regular rhythm.    Pulmonary/Chest: Effort normal. Scant  bibasilar rales   Abdominal: Soft. Bowel sounds are normal.   Musculoskeletal: Equal B/L edema   Neurological: No focal deficits.   Skin: Skin is warm and dry.   Psychiatric: He has a normal mood and affect.   Nursing note and vitals reviewed.    Significant Labs:  CBC:    Recent Labs  Lab 01/10/18  1355 01/12/18 2133   WBC 6.75 5.51   RBC 4.94 4.87   HGB 13.9* 13.9*   HCT 43.7 42.8    224   MCV 89 88   MCH 28.1 28.5   MCHC 31.8* 32.5     BNP:    Recent Labs  Lab 01/10/18  1355 01/12/18  2133   BNP 2,012* 1,742*     CMP:    Recent Labs  Lab 01/10/18  1355 01/12/18  2133 01/13/18  0328 01/13/18  1237 01/14/18  0639    102 87 108 92   CALCIUM 9.6 9.7 9.9 10.2 9.7   ALBUMIN 3.6 3.4*  --  3.6  --    PROT 7.6 7.3  --  7.8  --     139 138 138 140   K 3.7 3.1* 3.5 3.5 3.1*   CO2 33* 30* 28 32* 31*   CL 94* 99 99 97 98   BUN 28* 30* 33* 32* 33*   CREATININE 2.0* 2.0* 2.0* 2.1* 1.8*   ALKPHOS 85 90  --  94  --    ALT 21 22  --  21  --    AST 23 25  --  24  --    BILITOT 1.5* 0.9  --  1.0  --       I have reviewed all pertinent labs within the past 24 hours.    Estimated Creatinine Clearance: 64.7 mL/min (based on SCr of 1.8 mg/dL (H)).    Diagnostic Results:  I have reviewed all pertinent imaging results/findings within the past 24 hours.    Assessment and Plan:     * Acute combined systolic and diastolic heart failure    - NICM with Stage C/D with NYHA 2-3 symptoms   - Wet / Warm on exam   - Continue lasix gtt at 10/hr  - Strict I/Os and daily weights.   - GDMT: Continue carvedilol and losartan   - Step 1 of pathway pending clearance by CTS   - Planning for RHC in next 24-48 hours pending euvolemia           CKD (chronic kidney disease), stage III    - Cr around 2.0 at baseline   - Cr 1.8 this AM  - Daily BMP and avoid nephrotoxic agents         PVT (paroxysmal ventricular tachycardia)    - ICD (2014)  - Continue amiodarone  - Goal of K> 4 and Mg >2            Hilarioariella thomas, DO  Heart  Transplant  [facility]

## 2018-01-14 NOTE — ASSESSMENT & PLAN NOTE
- NICM with Stage C/D with NYHA 2-3 symptoms   - Wet / Warm on exam   - Continue lasix gtt at 10/hr  - Strict I/Os and daily weights.   - GDMT: Continue carvedilol and losartan   - Step 1 of pathway pending clearance by CTS   - Planning for RHC in next 24-48 hours pending euvolemia

## 2018-01-14 NOTE — SUBJECTIVE & OBJECTIVE
Interval History: Continues to do well. NO acute issues. -2.4Ls in 24 hours.     Continuous Infusions:   furosemide (LASIX) 1 mg/mL infusion (non-titrating) 10 mg/hr (01/12/18 2145)     Scheduled Meds:   allopurinol  100 mg Oral TID    amiodarone  200 mg Oral Daily    aspirin  81 mg Oral Daily    carvedilol  3.125 mg Oral Daily    losartan  25 mg Oral Daily    potassium chloride SA  40 mEq Oral BID    sodium chloride 0.9%  3 mL Intravenous Q8H     PRN Meds:    Review of patient's allergies indicates:   Allergen Reactions    Lisinopril Anaphylaxis     Objective:     Vital Signs (Most Recent):  Temp: 98.5 °F (36.9 °C) (01/14/18 1145)  Pulse: 72 (01/14/18 1145)  Resp: 18 (01/14/18 1145)  BP: 101/65 (01/14/18 1145)  SpO2: 96 % (01/14/18 1145) Vital Signs (24h Range):  Temp:  [98.1 °F (36.7 °C)-98.5 °F (36.9 °C)] 98.5 °F (36.9 °C)  Pulse:  [67-77] 72  Resp:  [17-18] 18  SpO2:  [95 %-97 %] 96 %  BP: ()/(59-66) 101/65     Patient Vitals for the past 72 hrs (Last 3 readings):   Weight   01/14/18 0700 115.6 kg (254 lb 15.4 oz)   01/13/18 0700 117.1 kg (258 lb 2.5 oz)   01/12/18 1932 118.6 kg (261 lb 5.7 oz)     Body mass index is 33.64 kg/m².      Intake/Output Summary (Last 24 hours) at 01/14/18 1155  Last data filed at 01/14/18 0635   Gross per 24 hour   Intake           1627.5 ml   Output             3900 ml   Net          -2272.5 ml     Physical Exam    Constitutional: AAOx2  Eyes: No scleral icterus. PERRL  Neck: Supple. Elevated JVD up to jaw. + hepatojagular reflex  Cardiovascular: Normal rate and regular rhythm.    Pulmonary/Chest: Effort normal. Scant bibasilar rales   Abdominal: Soft. Bowel sounds are normal.   Musculoskeletal: Equal B/L edema   Neurological: No focal deficits.   Skin: Skin is warm and dry.   Psychiatric: He has a normal mood and affect.   Nursing note and vitals reviewed.    Significant Labs:  CBC:    Recent Labs  Lab 01/10/18  1355 01/12/18  2133   WBC 6.75 5.51   RBC 4.94 4.87    HGB 13.9* 13.9*   HCT 43.7 42.8    224   MCV 89 88   MCH 28.1 28.5   MCHC 31.8* 32.5     BNP:    Recent Labs  Lab 01/10/18  1355 01/12/18 2133   BNP 2,012* 1,742*     CMP:    Recent Labs  Lab 01/10/18  1355 01/12/18  2133 01/13/18  0328 01/13/18  1237 01/14/18  0639    102 87 108 92   CALCIUM 9.6 9.7 9.9 10.2 9.7   ALBUMIN 3.6 3.4*  --  3.6  --    PROT 7.6 7.3  --  7.8  --     139 138 138 140   K 3.7 3.1* 3.5 3.5 3.1*   CO2 33* 30* 28 32* 31*   CL 94* 99 99 97 98   BUN 28* 30* 33* 32* 33*   CREATININE 2.0* 2.0* 2.0* 2.1* 1.8*   ALKPHOS 85 90  --  94  --    ALT 21 22  --  21  --    AST 23 25  --  24  --    BILITOT 1.5* 0.9  --  1.0  --       I have reviewed all pertinent labs within the past 24 hours.    Estimated Creatinine Clearance: 64.7 mL/min (based on SCr of 1.8 mg/dL (H)).    Diagnostic Results:  I have reviewed all pertinent imaging results/findings within the past 24 hours.

## 2018-01-15 ENCOUNTER — TELEPHONE (OUTPATIENT)
Dept: TRANSPLANT | Facility: CLINIC | Age: 53
End: 2018-01-15

## 2018-01-15 DIAGNOSIS — R06.02 SHORTNESS OF BREATH: ICD-10-CM

## 2018-01-15 DIAGNOSIS — Z92.29 PERSONAL HISTORY OF LONG-TERM (CURRENT) USE OF ANTICOAGULANTS: ICD-10-CM

## 2018-01-15 DIAGNOSIS — Z72.0 NICOTINE ABUSE: ICD-10-CM

## 2018-01-15 DIAGNOSIS — I73.9 PERIPHERAL VASCULAR DISEASE: ICD-10-CM

## 2018-01-15 DIAGNOSIS — I50.22 CHRONIC SYSTOLIC HEART FAILURE: ICD-10-CM

## 2018-01-15 DIAGNOSIS — Z12.5 SCREENING FOR PROSTATE CANCER: ICD-10-CM

## 2018-01-15 DIAGNOSIS — E03.9 HYPOTHYROIDISM, UNSPECIFIED TYPE: ICD-10-CM

## 2018-01-15 DIAGNOSIS — Z76.82 ORGAN TRANSPLANT CANDIDATE: ICD-10-CM

## 2018-01-15 LAB
ALBUMIN SERPL BCP-MCNC: 3.5 G/DL
ALP SERPL-CCNC: 85 U/L
ALT SERPL W/O P-5'-P-CCNC: 22 U/L
ANION GAP SERPL CALC-SCNC: 10 MMOL/L
AST SERPL-CCNC: 38 U/L
BASOPHILS # BLD AUTO: 0.01 K/UL
BASOPHILS NFR BLD: 0.2 %
BILIRUB SERPL-MCNC: 0.8 MG/DL
BUN SERPL-MCNC: 34 MG/DL
CALCIUM SERPL-MCNC: 9.6 MG/DL
CHLORIDE SERPL-SCNC: 97 MMOL/L
CO2 SERPL-SCNC: 31 MMOL/L
CREAT SERPL-MCNC: 1.9 MG/DL
DIFFERENTIAL METHOD: ABNORMAL
EOSINOPHIL # BLD AUTO: 0.1 K/UL
EOSINOPHIL NFR BLD: 1.4 %
ERYTHROCYTE [DISTWIDTH] IN BLOOD BY AUTOMATED COUNT: 15.7 %
EST. GFR  (AFRICAN AMERICAN): 46.2 ML/MIN/1.73 M^2
EST. GFR  (NON AFRICAN AMERICAN): 39.9 ML/MIN/1.73 M^2
GLUCOSE SERPL-MCNC: 84 MG/DL
HCT VFR BLD AUTO: 43.4 %
HGB BLD-MCNC: 13.7 G/DL
IMM GRANULOCYTES # BLD AUTO: 0.02 K/UL
IMM GRANULOCYTES NFR BLD AUTO: 0.4 %
LYMPHOCYTES # BLD AUTO: 1.1 K/UL
LYMPHOCYTES NFR BLD: 18.7 %
MAGNESIUM SERPL-MCNC: 2.5 MG/DL
MCH RBC QN AUTO: 28.1 PG
MCHC RBC AUTO-ENTMCNC: 31.6 G/DL
MCV RBC AUTO: 89 FL
MONOCYTES # BLD AUTO: 0.7 K/UL
MONOCYTES NFR BLD: 12.3 %
NEUTROPHILS # BLD AUTO: 3.8 K/UL
NEUTROPHILS NFR BLD: 67 %
NRBC BLD-RTO: 0 /100 WBC
PLATELET # BLD AUTO: 216 K/UL
PMV BLD AUTO: 10.7 FL
POTASSIUM SERPL-SCNC: 4.1 MMOL/L
PROT SERPL-MCNC: 8 G/DL
RBC # BLD AUTO: 4.87 M/UL
SODIUM SERPL-SCNC: 138 MMOL/L
WBC # BLD AUTO: 5.67 K/UL

## 2018-01-15 PROCEDURE — A4216 STERILE WATER/SALINE, 10 ML: HCPCS | Mod: NTX | Performed by: INTERNAL MEDICINE

## 2018-01-15 PROCEDURE — 99233 SBSQ HOSP IP/OBS HIGH 50: CPT | Mod: NTX,,, | Performed by: NURSE PRACTITIONER

## 2018-01-15 PROCEDURE — 99233 SBSQ HOSP IP/OBS HIGH 50: CPT | Mod: NTX,,, | Performed by: INTERNAL MEDICINE

## 2018-01-15 PROCEDURE — 63600175 PHARM REV CODE 636 W HCPCS: Mod: NTX | Performed by: INTERNAL MEDICINE

## 2018-01-15 PROCEDURE — 36415 COLL VENOUS BLD VENIPUNCTURE: CPT | Mod: NTX

## 2018-01-15 PROCEDURE — 20600001 HC STEP DOWN PRIVATE ROOM: Mod: NTX

## 2018-01-15 PROCEDURE — 80053 COMPREHEN METABOLIC PANEL: CPT | Mod: NTX

## 2018-01-15 PROCEDURE — 85025 COMPLETE CBC W/AUTO DIFF WBC: CPT | Mod: NTX

## 2018-01-15 PROCEDURE — 97165 OT EVAL LOW COMPLEX 30 MIN: CPT | Mod: NTX

## 2018-01-15 PROCEDURE — 83735 ASSAY OF MAGNESIUM: CPT | Mod: NTX

## 2018-01-15 PROCEDURE — 25000003 PHARM REV CODE 250: Mod: NTX | Performed by: INTERNAL MEDICINE

## 2018-01-15 RX ADMIN — FUROSEMIDE 10 MG/HR: 10 INJECTION, SOLUTION INTRAVENOUS at 06:01

## 2018-01-15 RX ADMIN — Medication 3 ML: at 02:01

## 2018-01-15 RX ADMIN — LOSARTAN POTASSIUM 25 MG: 25 TABLET, FILM COATED ORAL at 09:01

## 2018-01-15 RX ADMIN — Medication 3 ML: at 05:01

## 2018-01-15 RX ADMIN — CARVEDILOL 3.12 MG: 3.12 TABLET, FILM COATED ORAL at 09:01

## 2018-01-15 RX ADMIN — AMIODARONE HYDROCHLORIDE 200 MG: 200 TABLET ORAL at 09:01

## 2018-01-15 RX ADMIN — ALLOPURINOL 100 MG: 100 TABLET ORAL at 08:01

## 2018-01-15 RX ADMIN — ALLOPURINOL 100 MG: 100 TABLET ORAL at 02:01

## 2018-01-15 RX ADMIN — ASPIRIN 81 MG: 81 TABLET, COATED ORAL at 09:01

## 2018-01-15 RX ADMIN — ALLOPURINOL 100 MG: 100 TABLET ORAL at 05:01

## 2018-01-15 RX ADMIN — POTASSIUM CHLORIDE 40 MEQ: 1500 TABLET, EXTENDED RELEASE ORAL at 08:01

## 2018-01-15 RX ADMIN — POTASSIUM CHLORIDE 40 MEQ: 1500 TABLET, EXTENDED RELEASE ORAL at 09:01

## 2018-01-15 NOTE — LETTER
January 15, 2018    RE:  Tim Richards           MRN  [id number]             67             To Whom It May Concern:    Mr. Tim Richards is a 52 y.o. year-old male patient at [facility] and was seen for consideration for cardiac transplantation.  He has a diagnosis of Non-Ischemic Cardiomyopathy and recurrent VT.  He is impaired in his exercise tolerance with a NYHA Functional Class III, and an ejection fraction of 10-15%.    We feel that his functional impairment and lack of advanced options make his quality of life poor.  We feel that his only option at this time, after thorough review of all of his findings, is cardiac transplantation.  Therefore, we request you review of this case and approval for a cardiac transplant evaluation.    If we can be of any further assistance, please do not hesitate to contact us at the above address.    Sincerely,        Antonio Hadley Jr, MD, [facility]  Medical Director, Cardiomyopathy and Heart Failure Program

## 2018-01-15 NOTE — PLAN OF CARE
Problem: Patient Care Overview  Goal: Plan of Care Review  Outcome: Ongoing (interventions implemented as appropriate)  Pt free of falls/traumas/injuries. Skin remains clean, dry, and intact. Pt continued on lasix gtt. Pt ambulating in room. [facility] pending today 1/15/2017.  Pt re-educated on importance of measuring accurate intake and out put; pt verbalized and demonstrates understanding. Reviewed plan of care with pt; and pt verbalized understanding.  Pt VSS in no distress will continue to monitor.

## 2018-01-15 NOTE — SUBJECTIVE & OBJECTIVE
No current facility-administered medications on file prior to encounter.      Current Outpatient Prescriptions on File Prior to Encounter   Medication Sig    allopurinol (ZYLOPRIM) 100 MG tablet TAKE 1 TABLET (100 MG TOTAL) BY MOUTH 3 (THREE) TIMES DAILY.    amiodarone (PACERONE) 200 MG Tab TAKE 1 TABLET (200 MG TOTAL) BY MOUTH ONCE DAILY.    aspirin (ECOTRIN) 81 MG EC tablet Take 81 mg by mouth. 1 Tablet, Delayed Release (E.C.) Oral Every day    carvedilol (COREG) 3.125 MG tablet Take 1 tablet (3.125 mg total) by mouth 2 (two) times daily with meals.    furosemide (LASIX) 80 MG tablet Take 1 tablet (80 mg total) by mouth 2 (two) times daily.    potassium chloride SA (K-DUR,KLOR-CON) 20 MEQ tablet Take 2 tablets (40 mEq total) by mouth 2 (two) times daily.    valsartan (DIOVAN) 80 MG tablet Take 0.5 tablets (40 mg total) by mouth 2 (two) times daily.    VITAMIN E ACETATE (VITAMIN E ORAL) Take by mouth once daily.    ERGOCALCIFEROL, VITAMIN D2, (VITAMIN D ORAL) Take by mouth once daily.       Review of patient's allergies indicates:   Allergen Reactions    Lisinopril Anaphylaxis       Past Medical History:   Diagnosis Date    CHF (congestive heart failure)     Dilated cardiomyopathy 1/10/2018    Disorder of kidney and ureter     CKD    Gout     HTN (hypertension)     Ventricular tachycardia (paroxysmal)      Past Surgical History:   Procedure Laterality Date    CARDIAC CATHETERIZATION  Dec. 2012    CARDIAC DEFIBRILLATOR PLACEMENT Left     CRRT-D     Family History     Problem Relation (Age of Onset)    Cancer Sister (54)    Coronary artery disease Father    Diabetes Father    Heart disease Father    Hypertension Father    No Known Problems Mother, Brother        Social History Main Topics    Smoking status: Current Every Day Smoker     Packs/day: 1.00     Years: 31.00     Types: Cigarettes    Smokeless tobacco: Never Used      Comment: pt is quiting on his own - pt stated not qualified for  program; 2/16 pt  quit on his own    Alcohol use 0.0 oz/week      Comment: one fifth of gin or rum/week    Drug use: No    Sexual activity: Yes     Partners: Female     Birth control/ protection: None      Comment: 10/5/17  with same partner 7 years      Review of Systems   Constitutional: Positive for activity change, fatigue and unexpected weight change.   HENT: Negative.    Eyes: Negative.    Respiratory: Positive for cough and shortness of breath.    Cardiovascular: Positive for palpitations and leg swelling.   Gastrointestinal: Positive for abdominal distention and nausea.   Endocrine: Negative.    Genitourinary: Negative.    Musculoskeletal: Negative.    Neurological: Positive for dizziness, weakness and light-headedness.   Hematological: Negative.    Psychiatric/Behavioral: Negative.      Objective:     Vital Signs (Most Recent):  Temp: 97.8 °F (36.6 °C) (01/15/18 1221)  Pulse: 75 (01/15/18 1500)  Resp: 18 (01/15/18 1221)  BP: 99/62 (01/15/18 1221)  SpO2: (!) 94 % (01/15/18 1221) Vital Signs (24h Range):  Temp:  [97 °F (36.1 °C)-98.5 °F (36.9 °C)] 97.8 °F (36.6 °C)  Pulse:  [72-82] 75  Resp:  [16-18] 18  SpO2:  [93 %-99 %] 94 %  BP: ()/(61-72) 99/62     Weight: 116.6 kg (257 lb 0.9 oz)  Body mass index is 33.91 kg/m².    SpO2: (!) 94 %  O2 Device (Oxygen Therapy): room air     Intake/Output - Last 3 Shifts       01/13 0700 - 01/14 0659 01/14 0700 - 01/15 0659 01/15 0700 - 01/16 0659    P.O. 1675 1380     I.V. (mL/kg) 312.5 (2.7) 110 (1)     Total Intake(mL/kg) 1987.5 (17) 1490 (12.9)     Urine (mL/kg/hr) 4350 (1.5) 4000 (1.4) 1125 (1.1)    Stool 0 (0) 0 (0)     Total Output 4350 4000 1125    Net -2362.5 -2510 -1125           Stool Occurrence 0 x             Lines/Drains/Airways     Peripheral Intravenous Line                 Peripheral IV - Single Lumen 12/21/17 1810 Left Antecubital 24 days         Peripheral IV - Single Lumen 01/13/18 1530 Right Hand 1 day                     Physical Exam    Constitutional: He is oriented to person, place, and time. He appears well-developed and well-nourished.   HENT:   Head: Normocephalic and atraumatic.   Eyes: EOM are normal. Pupils are equal, round, and reactive to light.   Neck: Normal range of motion. Neck supple.   Cardiovascular: Normal rate.    Pulmonary/Chest: Effort normal and breath sounds normal.   Abdominal: Soft. Bowel sounds are normal.   Musculoskeletal: Normal range of motion.   Neurological: He is alert and oriented to person, place, and time.   Skin: Skin is warm.   Nursing note and vitals reviewed.      Significant Labs:  BMP:   Recent Labs  Lab 01/15/18  0545   GLU 84      K 4.1   CL 97   CO2 31*   BUN 34*   CREATININE 1.9*   CALCIUM 9.6   MG 2.5     CBC:   Recent Labs  Lab 01/15/18  0545   WBC 5.67   RBC 4.87   HGB 13.7*   HCT 43.4      MCV 89   MCH 28.1   MCHC 31.6*     LFTs:   Recent Labs  Lab 01/15/18  0545   ALT 22   AST 38   ALKPHOS 85   BILITOT 0.8   PROT 8.0   ALBUMIN 3.5       Significant Diagnostics:  LVEDD 9.5 cm  1 - Severe left ventricular enlargement.     2 - Severely depressed left ventricular systolic function (EF 10-15%).     3 - Right ventricular enlargement with low normal systolic function.     4 - Biatrial enlargement.     5 - Severe functional mitral regurgitation.     6 - Mild tricuspid regurgitation.     7 - Pulmonary hypertension. The estimated PA systolic pressure is 47 mmHg.     8 - Intermediate central venous pressure.   TAPSE 2.2

## 2018-01-15 NOTE — ASSESSMENT & PLAN NOTE
-NICMP with Stage D with NYHA 2 symptomatology  -Warm/wet on exam.  -Continue Lasix at current dose at 10 mg/hr.   -RHC tomorrow.   -Pending CTS evaluation following which to be advanced to pathway step 2.

## 2018-01-15 NOTE — NURSING
"Met with pt and wife at bedside. Pt reports he has looked at "some" of his education packet that was given to him in clinic but has not reviewed consent form. Advisded pt that the consent form must be read and signed prior to initiation of formal evaluation. Pt will read this today while awaiting CTS consult.     Pt is aware that recurrent VT may preclude him of LVAD and that current alcohol and tobacco use may preclude OHT. He is aware that he may need to be maintained with medical management while he achieves 6 mths of alcohol and tobacco cessation.   "

## 2018-01-15 NOTE — ASSESSMENT & PLAN NOTE
CT surgery asked to evaluate this patient for advanced therapies.  He is currently admitted with a lasix infusion.  He is dilated at 9.5 cm.  His creatinine is 1.9 with currently a normal T-bili.  CT scan without findings that would preclude continuation of his work up.    Will follow with you.  Present at selection when  Work up has been completed.

## 2018-01-15 NOTE — PROGRESS NOTES
At the request of Dr. Lopez, I have been asked to meet patient and provide VAD education. Introduced self and reason for visit. Pt and Caregiver AAAO.  Provided written VAD education (red folder). Included in folder is the following: VAD education, wellness contract, acknowledgement of being evaluated for VAD, support group flier, ICU visitation hours, VAD newsletter, Intermacs information, picture of VAD  In body, [employer] pamphlet, Living with HM II DVD, There is hope pamphlet, Tomorrow depends on the decision we make today patient education pack (HM II), [employer] information, and business cards for all VAD coordinators. Explained that we use 3 different types of pumps here and information on both pumps is in the red folder. I explained the work up process as well.     Explained to look over the entire contents and read the wellness contract, caregiver agreement and acknowledgement form.  Also explained they should bring this folder with them to all clinic visits and if they are admitted to the hospital so we can continue education as needed. Should there be any questions, please write them down and bring with you or feel free to call and we can talk on the phone. All questions answered to satisfaction as evidence by verbal acknowledgement.

## 2018-01-15 NOTE — PLAN OF CARE
Problem: Occupational Therapy Goal  Goal: Occupational Therapy Goal  Outcome: Ongoing (interventions implemented as appropriate)  OT evaluation completed and pt d/c'ed 1/15/2017 as pt with no further acute OT needs at this time.

## 2018-01-15 NOTE — ASSESSMENT & PLAN NOTE
-ICD in place, without new firing.   -Continue Amiodarone at current dose  -Goal K > 4 and Mag > 2

## 2018-01-15 NOTE — PLAN OF CARE
Problem: Patient Care Overview  Goal: Plan of Care Review  Outcome: Ongoing (interventions implemented as appropriate)  No changes to the plan of care at this time. Patient appears stable without complaints. Will continue to monitor. IV Lasix continues, patient knows he needs RHC once euvolemic.

## 2018-01-15 NOTE — PROGRESS NOTES
[facility]  Cardiology  Progress Note    Patient Name: Tim Richards  MRN: [id number]  Admission Date: 1/12/2018  Hospital Length of Stay: 3 days  Code Status: Full Code   Attending Physician: Antonio Hadley Jr.,*   Primary Care Physician: Ja Méndez MD  Expected Discharge Date: 1/18/2018  Principal Problem:Acute combined systolic and diastolic heart failure    Subjective:     Hospital Course:   No notes on file    Interval History: pt remained stable overnight. Denied CP, SOB or palpitation    Review of Systems   Constitution: Negative.   Cardiovascular: Negative.    Respiratory: Negative.    Musculoskeletal: Negative.    Gastrointestinal: Negative.    Genitourinary: Negative.    Neurological: Negative.    Psychiatric/Behavioral: Negative.      Objective:     Vital Signs (Most Recent):  Temp: 97.8 °F (36.6 °C) (01/15/18 1221)  Pulse: 75 (01/15/18 1500)  Resp: 18 (01/15/18 1221)  BP: 99/62 (01/15/18 1221)  SpO2: (!) 94 % (01/15/18 1221) Vital Signs (24h Range):  Temp:  [97 °F (36.1 °C)-98.5 °F (36.9 °C)] 97.8 °F (36.6 °C)  Pulse:  [72-82] 75  Resp:  [16-18] 18  SpO2:  [93 %-99 %] 94 %  BP: ()/(61-72) 99/62     Weight: 116.6 kg (257 lb 0.9 oz)  Body mass index is 33.91 kg/m².     SpO2: (!) 94 %  O2 Device (Oxygen Therapy): room air      Intake/Output Summary (Last 24 hours) at 01/15/18 1557  Last data filed at 01/15/18 1000   Gross per 24 hour   Intake              570 ml   Output             3225 ml   Net            -2655 ml       Lines/Drains/Airways     Peripheral Intravenous Line                 Peripheral IV - Single Lumen 12/21/17 1810 Left Antecubital 24 days         Peripheral IV - Single Lumen 01/13/18 1530 Right Hand 2 days                Physical Exam   Constitutional: He is oriented to person, place, and time. He appears well-developed and well-nourished.   HENT:   Head: Normocephalic and atraumatic.   Mouth/Throat: Oropharynx is clear and moist.   Neck: JVD  (LOWER THIRD OF MIDNECK AREA) present.   Cardiovascular: Normal rate, regular rhythm and intact distal pulses.    Pulmonary/Chest: Effort normal and breath sounds normal. No respiratory distress. He has no rales. He exhibits no tenderness.   Abdominal: Soft. Bowel sounds are normal. He exhibits no distension and no mass. There is no rebound.   Musculoskeletal: Normal range of motion. He exhibits no edema, tenderness or deformity.   Neurological: He is alert and oriented to person, place, and time. He has normal reflexes. He displays normal reflexes. No cranial nerve deficit. He exhibits normal muscle tone. Coordination normal.   Skin: Skin is dry. No erythema. No pallor.   Psychiatric: He has a normal mood and affect. Judgment normal.   Nursing note and vitals reviewed.      Significant Labs:   CMP   Recent Labs  Lab 01/14/18  0639 01/15/18  0545    138   K 3.1* 4.1   CL 98 97   CO2 31* 31*   GLU 92 84   BUN 33* 34*   CREATININE 1.8* 1.9*   CALCIUM 9.7 9.6   PROT  --  8.0   ALBUMIN  --  3.5   BILITOT  --  0.8   ALKPHOS  --  85   AST  --  38   ALT  --  22   ANIONGAP 11 10   ESTGFRAFRICA 49.3* 46.2*   EGFRNONAA 42.6* 39.9*   , CBC   Recent Labs  Lab 01/15/18  0545   WBC 5.67   HGB 13.7*   HCT 43.4       and INR No results for input(s): INR, PROTIME in the last 48 hours.    Significant Imaging: X-Ray: CXR: X-Ray Chest 1 View (CXR): No results found for this visit on 01/12/18.    Assessment and Plan:     Brief HPI: 52 YOAAM with NICMP admitted for ADHF with volume overload without significant drop in blood pressure, plan to diurese and continue to work up for advance therapies.     * Acute combined systolic and diastolic heart failure    -NICMP with Stage D with NYHA 2 symptomatology  -Warm/wet on exam.  -Continue Lasix at current dose at 10 mg/hr.   -RHC tomorrow.   -Pending CTS evaluation following which to be advanced to pathway step 2.           CKD (chronic kidney disease), stage III    -Improving  creatinine (1.9 < 2.0)   -Continue to follow renal functions.         PVT (paroxysmal ventricular tachycardia)    -ICD in place, without new firing.   -Continue Amiodarone at current dose  -Goal K > 4 and Mag > 2             VTE Risk Mitigation         Ordered     Medium Risk of VTE  Once      01/12/18 2045          Vimal Bernal MD  Cardiology  [facility]

## 2018-01-15 NOTE — CONSULTS
[facility]  Cardiothoracic Surgery  Consult Note    Patient Name: Tim Richards  MRN: [id number]  Admission Date: 1/12/2018  Attending Physician: Antonio Hadley Jr.,*  Referring Provider: Antonio Hadley Jr*    Patient information was obtained from EMR/patient    Inpatient consult to Cardiothoracic Surgery  Consult performed by: JOSE F JUDGE  Consult ordered by: CLAY BAUMAN        Subjective:     Principal Problem: Acute combined systolic and diastolic heart failure    History of Present Illness: 52 y.o. Male with CHF stage D, S/p, CRT and  ICD secondary to VT and multiple interrogation  There after showing Vt, hx of alcohol use and medical therapy noncompliance presenting from home after being seen in clinic 3 days ago for advanced option wallace. During that visit, his renal function was found to be at baseline of 2 while BNP was also significantly elevated at 2012.  His LFTs were also notable for  Mild elevation of AST and ALT which was slightly better from previous levels. He has been following with Dr. Doe at [facility] for CHF since 2011 when he was diagnosed with dialted cardiomyopathy and low EF of 25% after a cardiac cath that revealed non obstructive coronaries. He had an ICD placed on 10/31/2014 for episodic VT . Last  Echo on file was three months ago and his EF was 10% with moderate mitral valve insufficient. Patient was referred to [facility] for LVAD/OHT workup but preferred to come in today after taking care of some social issues. He report a lot of fatigue at home and at work although he is mostly seated at workplace. His appetite is also decreased without weight loss, leg edema, constipation, PND or orthopnea. He also denies ICD misfiring, chest pain or palpitation. There is a order for colonoscopy in file in 2017 but there is no evidence of it having been done.     No current facility-administered medications on file prior to encounter.      Current Outpatient  Prescriptions on File Prior to Encounter   Medication Sig    allopurinol (ZYLOPRIM) 100 MG tablet TAKE 1 TABLET (100 MG TOTAL) BY MOUTH 3 (THREE) TIMES DAILY.    amiodarone (PACERONE) 200 MG Tab TAKE 1 TABLET (200 MG TOTAL) BY MOUTH ONCE DAILY.    aspirin (ECOTRIN) 81 MG EC tablet Take 81 mg by mouth. 1 Tablet, Delayed Release (E.C.) Oral Every day    carvedilol (COREG) 3.125 MG tablet Take 1 tablet (3.125 mg total) by mouth 2 (two) times daily with meals.    furosemide (LASIX) 80 MG tablet Take 1 tablet (80 mg total) by mouth 2 (two) times daily.    potassium chloride SA (K-DUR,KLOR-CON) 20 MEQ tablet Take 2 tablets (40 mEq total) by mouth 2 (two) times daily.    valsartan (DIOVAN) 80 MG tablet Take 0.5 tablets (40 mg total) by mouth 2 (two) times daily.    VITAMIN E ACETATE (VITAMIN E ORAL) Take by mouth once daily.    ERGOCALCIFEROL, VITAMIN D2, (VITAMIN D ORAL) Take by mouth once daily.       Review of patient's allergies indicates:   Allergen Reactions    Lisinopril Anaphylaxis       Past Medical History:   Diagnosis Date    CHF (congestive heart failure)     Dilated cardiomyopathy 1/10/2018    Disorder of kidney and ureter     CKD    Gout     HTN (hypertension)     Ventricular tachycardia (paroxysmal)      Past Surgical History:   Procedure Laterality Date    CARDIAC CATHETERIZATION  Dec. 2012    CARDIAC DEFIBRILLATOR PLACEMENT Left     CRRT-D     Family History     Problem Relation (Age of Onset)    Cancer Sister (54)    Coronary artery disease Father    Diabetes Father    Heart disease Father    Hypertension Father    No Known Problems Mother, Brother        Social History Main Topics    Smoking status: Current Every Day Smoker     Packs/day: 1.00     Years: 31.00     Types: Cigarettes    Smokeless tobacco: Never Used      Comment: pt is quiting on his own - pt stated not qualified for program; 2/16 pt  quit on his own    Alcohol use 0.0 oz/week      Comment: one fifth of gin or rum/week     Drug use: No    Sexual activity: Yes     Partners: Female     Birth control/ protection: None      Comment: 10/5/17  with same partner 7 years      Review of Systems   Constitutional: Positive for activity change, fatigue and unexpected weight change.   HENT: Negative.    Eyes: Negative.    Respiratory: Positive for cough and shortness of breath.    Cardiovascular: Positive for palpitations and leg swelling.   Gastrointestinal: Positive for abdominal distention and nausea.   Endocrine: Negative.    Genitourinary: Negative.    Musculoskeletal: Negative.    Neurological: Positive for dizziness, weakness and light-headedness.   Hematological: Negative.    Psychiatric/Behavioral: Negative.      Objective:     Vital Signs (Most Recent):  Temp: 97.8 °F (36.6 °C) (01/15/18 1221)  Pulse: 75 (01/15/18 1500)  Resp: 18 (01/15/18 1221)  BP: 99/62 (01/15/18 1221)  SpO2: (!) 94 % (01/15/18 1221) Vital Signs (24h Range):  Temp:  [97 °F (36.1 °C)-98.5 °F (36.9 °C)] 97.8 °F (36.6 °C)  Pulse:  [72-82] 75  Resp:  [16-18] 18  SpO2:  [93 %-99 %] 94 %  BP: ()/(61-72) 99/62     Weight: 116.6 kg (257 lb 0.9 oz)  Body mass index is 33.91 kg/m².    SpO2: (!) 94 %  O2 Device (Oxygen Therapy): room air     Intake/Output - Last 3 Shifts       01/13 0700 - 01/14 0659 01/14 0700 - 01/15 0659 01/15 0700 - 01/16 0659    P.O. 1675 1380     I.V. (mL/kg) 312.5 (2.7) 110 (1)     Total Intake(mL/kg) 1987.5 (17) 1490 (12.9)     Urine (mL/kg/hr) 4350 (1.5) 4000 (1.4) 1125 (1.1)    Stool 0 (0) 0 (0)     Total Output 4350 4000 1125    Net -2362.5 -2510 -1125           Stool Occurrence 0 x             Lines/Drains/Airways     Peripheral Intravenous Line                 Peripheral IV - Single Lumen 12/21/17 1810 Left Antecubital 24 days         Peripheral IV - Single Lumen 01/13/18 1530 Right Hand 1 day                     Physical Exam   Constitutional: He is oriented to person, place, and time. He appears well-developed and well-nourished.    HENT:   Head: Normocephalic and atraumatic.   Eyes: EOM are normal. Pupils are equal, round, and reactive to light.   Neck: Normal range of motion. Neck supple.   Cardiovascular: Normal rate.    Pulmonary/Chest: Effort normal and breath sounds normal.   Abdominal: Soft. Bowel sounds are normal.   Musculoskeletal: Normal range of motion.   Neurological: He is alert and oriented to person, place, and time.   Skin: Skin is warm.   Nursing note and vitals reviewed.      Significant Labs:  BMP:   Recent Labs  Lab 01/15/18  0545   GLU 84      K 4.1   CL 97   CO2 31*   BUN 34*   CREATININE 1.9*   CALCIUM 9.6   MG 2.5     CBC:   Recent Labs  Lab 01/15/18  0545   WBC 5.67   RBC 4.87   HGB 13.7*   HCT 43.4      MCV 89   MCH 28.1   MCHC 31.6*     LFTs:   Recent Labs  Lab 01/15/18  0545   ALT 22   AST 38   ALKPHOS 85   BILITOT 0.8   PROT 8.0   ALBUMIN 3.5       Significant Diagnostics:  LVEDD 9.5 cm  1 - Severe left ventricular enlargement.     2 - Severely depressed left ventricular systolic function (EF 10-15%).     3 - Right ventricular enlargement with low normal systolic function.     4 - Biatrial enlargement.     5 - Severe functional mitral regurgitation.     6 - Mild tricuspid regurgitation.     7 - Pulmonary hypertension. The estimated PA systolic pressure is 47 mmHg.     8 - Intermediate central venous pressure.   TAPSE 2.2    Assessment/Plan:     NYHA Score: III    * Acute combined systolic and diastolic heart failure    CT surgery asked to evaluate this patient for advanced therapies.  He is currently admitted with a lasix infusion.  He is dilated at 9.5 cm.  His creatinine is 1.9 with currently a normal T-bili.  CT scan without findings that would preclude continuation of his work up.    Will follow with you.  Present at selection when  Work up has been completed.                Michell Salazar NP  Cardiothoracic Surgery  [facility]

## 2018-01-15 NOTE — HPI
52 y.o. Male with CHF stage D, S/p, CRT and  ICD secondary to VT and multiple interrogation  There after showing Vt, hx of alcohol use and medical therapy noncompliance presenting from home after being seen in clinic 3 days ago for advanced option wallace. During that visit, his renal function was found to be at baseline of 2 while BNP was also significantly elevated at 2012.  His LFTs were also notable for  Mild elevation of AST and ALT which was slightly better from previous levels. He has been following with Dr. Doe at [facility] for CHF since 2011 when he was diagnosed with dialted cardiomyopathy and low EF of 25% after a cardiac cath that revealed non obstructive coronaries. He had an ICD placed on 10/31/2014 for episodic VT . Last  Echo on file was three months ago and his EF was 10% with moderate mitral valve insufficient. Patient was referred to [facility] for LVAD/OHT workup but preferred to come in today after taking care of some social issues. He report a lot of fatigue at home and at work although he is mostly seated at workplace. His appetite is also decreased without weight loss, leg edema, constipation, PND or orthopnea. He also denies ICD misfiring, chest pain or palpitation. There is a order for colonoscopy in file in 2017 but there is no evidence of it having been done.

## 2018-01-15 NOTE — PT/OT/SLP EVAL
Occupational Therapy   Evaluation and Discharge Note    Name: Tim Richards  MRN: [id number]  Admitting Diagnosis:  Acute combined systolic and diastolic heart failure      Recommendations:     Discharge Recommendations: home  Discharge Equipment Recommendations:  none  Barriers to discharge:  None    History:     Occupational Profile:  Living Environment: Pt lives with spouse and three grandchildren in a 2SH with 4STE (no railings) and 13 stairs to 2nd floor (with L railings going up). Bathroom has a separate tub and walk in shower. Pt's bedroom and bathroom are upstairs.   Previous level of function: PTA, pt was independent with self-care, was utilizing a SPC for functional mobility only with gout flare-ups, was working full time in purchasing, and was driving.   Roles and Routines: Pt is a  and grandfather.   Equipment Owned:  cane, straight, cane, quad, rollator (pts owns, however does not utilize any equiptment)  Assistance upon Discharge: Pt's wife will be able to assist as needed.     Past Medical History:   Diagnosis Date    CHF (congestive heart failure)     Dilated cardiomyopathy 1/10/2018    Disorder of kidney and ureter     CKD    Gout     HTN (hypertension)     Ventricular tachycardia (paroxysmal)        Past Surgical History:   Procedure Laterality Date    CARDIAC CATHETERIZATION  Dec. 2012    CARDIAC DEFIBRILLATOR PLACEMENT Left     CRRT-D       Subjective     Chief Complaint: none reported   Patient/Family stated goals: return home   Communicated with: RN (layla) prior to session.  Pain/Comfort:  · Pain Rating 1: 0/10  · Pain Rating Post-Intervention 1: 0/10    Objective:     Patient found with: peripheral IV    General Precautions: Standard, fall   Orthopedic Precautions:N/A   Braces: N/A     Occupational Performance:    Bed Mobility:    · Patient completed Scooting/Bridging with independence  · Patient completed Supine to Sit with independence    Functional  Mobility/Transfers:  · Patient completed Sit <> Stand Transfer with independence  with  no assistive device   · Patient completed Bed <> Chair Transfer using functional mobility technique with independence with no assistive device     Pt engaging in functional mobility to simulate functional household distances approx 200 ft without any AD and independently without fatigue or LOB.     Activities of Daily Living:  · Feeding:  independence (simulated)  · Grooming: activity did not occur    · Bathing: activity did not occur, however pt reporting he had bathed himself earlier in the AM    · UB Dressing: activity did not occur    · LB Dressing: independence (to don pants-pt seated EOB to thread BLE's through pant legs and standing to clear over hips in standing with good balance)  · Toileting: activity did not occur      Cognitive/Visual Perceptual:  Cognitive/Psychosocial Skills:     -       Oriented to: Person, Place, Time and Situation   -       Follows Commands/attention:Follows multistep  commands  -       Communication: clear/fluent  -       Memory: No Deficits noted  -       Safety awareness/insight to disability: intact   -       Mood/Affect/Coping skills/emotional control: Appropriate to situation  Visual/Perceptual:      -Intact    Physical Exam:  Balance:    -       pt independent with sitting and standing   Postural examination/scapula alignment:    -       No postural abnormalities identified  Skin integrity: Visible skin intact  Edema:  None noted  Sensation:    -       Intact  Upper Extremity Range of Motion:     -       Right Upper Extremity: WFL  -       Left Upper Extremity: WFL  Upper Extremity Strength:    -       Right Upper Extremity: WFL  -       Left Upper Extremity: WFL   Strength:    -       Right Upper Extremity: WFL  -       Left Upper Extremity: WFL  Fine Motor Coordination:    -       Intact  Gross motor coordination:   WFL    Patient left up in chair with all lines intact, call button  "in reach, nursing\ notified and spouse present    [facility] 6 Click:  [facility] Total Score: 24    Treatment & Education:  Pt found supine in bed at start of session with spouse in the room. Pt independent with all mobility and ADLs assessed at this time.   Education:    Assessment:     Tim Richards is a 52 y.o. male with a medical diagnosis of Acute combined systolic and diastolic heart failure. At this time, patient is functioning at their prior level of function and does not require further acute OT services.     Clinical Decision Makin.  OT Low:  "Pt evaluation falls under low complexity for evaluation coding due to performance deficits noted in 1-3 areas as stated above and 0 co-morbities affecting current functional status. Data obtained from problem focused assessments. No modifications or assistance was required for completion of evaluation. Only brief occupational profile and history review completed."     Plan:     During this hospitalization, patient does not require further acute OT services.  Please re-consult if situation changes.    · Plan of Care Reviewed with: patient, spouse    This Plan of care has been discussed with the patient who was involved in its development and understands and is in agreement with the identified goals and treatment plan    GOALS:    Occupational Therapy Goals        Problem: Occupational Therapy Goal    Goal Priority Disciplines Outcome Interventions   Occupational Therapy Goal     OT, PT/OT Ongoing (interventions implemented as appropriate)                    Time Tracking:     OT Date of Treatment: 01/15/18  OT Start Time: 838  OT Stop Time: 851  OT Total Time (min): 13 min    Billable Minutes:Evaluation 13 minutes    Codi Perez OT  1/15/2018    "

## 2018-01-15 NOTE — SUBJECTIVE & OBJECTIVE
Interval History: pt remained stable overnight. Denied CP, SOB or palpitation    Review of Systems   Constitution: Negative.   Cardiovascular: Negative.    Respiratory: Negative.    Musculoskeletal: Negative.    Gastrointestinal: Negative.    Genitourinary: Negative.    Neurological: Negative.    Psychiatric/Behavioral: Negative.      Objective:     Vital Signs (Most Recent):  Temp: 97.8 °F (36.6 °C) (01/15/18 1221)  Pulse: 75 (01/15/18 1500)  Resp: 18 (01/15/18 1221)  BP: 99/62 (01/15/18 1221)  SpO2: (!) 94 % (01/15/18 1221) Vital Signs (24h Range):  Temp:  [97 °F (36.1 °C)-98.5 °F (36.9 °C)] 97.8 °F (36.6 °C)  Pulse:  [72-82] 75  Resp:  [16-18] 18  SpO2:  [93 %-99 %] 94 %  BP: ()/(61-72) 99/62     Weight: 116.6 kg (257 lb 0.9 oz)  Body mass index is 33.91 kg/m².     SpO2: (!) 94 %  O2 Device (Oxygen Therapy): room air      Intake/Output Summary (Last 24 hours) at 01/15/18 1557  Last data filed at 01/15/18 1000   Gross per 24 hour   Intake              570 ml   Output             3225 ml   Net            -2655 ml       Lines/Drains/Airways     Peripheral Intravenous Line                 Peripheral IV - Single Lumen 12/21/17 1810 Left Antecubital 24 days         Peripheral IV - Single Lumen 01/13/18 1530 Right Hand 2 days                Physical Exam   Constitutional: He is oriented to person, place, and time. He appears well-developed and well-nourished.   HENT:   Head: Normocephalic and atraumatic.   Mouth/Throat: Oropharynx is clear and moist.   Neck: JVD (LOWER THIRD OF MIDNECK AREA) present.   Cardiovascular: Normal rate, regular rhythm and intact distal pulses.    Pulmonary/Chest: Effort normal and breath sounds normal. No respiratory distress. He has no rales. He exhibits no tenderness.   Abdominal: Soft. Bowel sounds are normal. He exhibits no distension and no mass. There is no rebound.   Musculoskeletal: Normal range of motion. He exhibits no edema, tenderness or deformity.   Neurological: He is  alert and oriented to person, place, and time. He has normal reflexes. He displays normal reflexes. No cranial nerve deficit. He exhibits normal muscle tone. Coordination normal.   Skin: Skin is dry. No erythema. No pallor.   Psychiatric: He has a normal mood and affect. Judgment normal.   Nursing note and vitals reviewed.      Significant Labs:   CMP   Recent Labs  Lab 01/14/18  0639 01/15/18  0545    138   K 3.1* 4.1   CL 98 97   CO2 31* 31*   GLU 92 84   BUN 33* 34*   CREATININE 1.8* 1.9*   CALCIUM 9.7 9.6   PROT  --  8.0   ALBUMIN  --  3.5   BILITOT  --  0.8   ALKPHOS  --  85   AST  --  38   ALT  --  22   ANIONGAP 11 10   ESTGFRAFRICA 49.3* 46.2*   EGFRNONAA 42.6* 39.9*   , CBC   Recent Labs  Lab 01/15/18  0545   WBC 5.67   HGB 13.7*   HCT 43.4       and INR No results for input(s): INR, PROTIME in the last 48 hours.    Significant Imaging: X-Ray: CXR: X-Ray Chest 1 View (CXR): No results found for this visit on 01/12/18.

## 2018-01-16 ENCOUNTER — EDUCATION (OUTPATIENT)
Dept: TRANSPLANT | Facility: CLINIC | Age: 53
End: 2018-01-16

## 2018-01-16 ENCOUNTER — ANESTHESIA EVENT (OUTPATIENT)
Dept: MEDSURG UNIT | Facility: HOSPITAL | Age: 53
End: 2018-01-16

## 2018-01-16 LAB
ALBUMIN SERPL BCP-MCNC: 3.3 G/DL
ALP SERPL-CCNC: 83 U/L
ALT SERPL W/O P-5'-P-CCNC: 22 U/L
ANION GAP SERPL CALC-SCNC: 10 MMOL/L
AST SERPL-CCNC: 23 U/L
BASOPHILS # BLD AUTO: 0 K/UL
BASOPHILS NFR BLD: 0 %
BILIRUB SERPL-MCNC: 1 MG/DL
BUN SERPL-MCNC: 34 MG/DL
CALCIUM SERPL-MCNC: 9.5 MG/DL
CHLORIDE SERPL-SCNC: 101 MMOL/L
CO2 SERPL-SCNC: 28 MMOL/L
CREAT SERPL-MCNC: 1.9 MG/DL
DIFFERENTIAL METHOD: ABNORMAL
EOSINOPHIL # BLD AUTO: 0.1 K/UL
EOSINOPHIL NFR BLD: 1.1 %
ERYTHROCYTE [DISTWIDTH] IN BLOOD BY AUTOMATED COUNT: 15.6 %
EST. GFR  (AFRICAN AMERICAN): 46.2 ML/MIN/1.73 M^2
EST. GFR  (NON AFRICAN AMERICAN): 39.9 ML/MIN/1.73 M^2
GLUCOSE SERPL-MCNC: 87 MG/DL
HCT VFR BLD AUTO: 42.9 %
HGB BLD-MCNC: 13.5 G/DL
IMM GRANULOCYTES # BLD AUTO: 0.01 K/UL
IMM GRANULOCYTES NFR BLD AUTO: 0.2 %
LYMPHOCYTES # BLD AUTO: 1 K/UL
LYMPHOCYTES NFR BLD: 19 %
MAGNESIUM SERPL-MCNC: 2 MG/DL
MCH RBC QN AUTO: 28.1 PG
MCHC RBC AUTO-ENTMCNC: 31.5 G/DL
MCV RBC AUTO: 89 FL
MONOCYTES # BLD AUTO: 0.7 K/UL
MONOCYTES NFR BLD: 13.3 %
NEUTROPHILS # BLD AUTO: 3.6 K/UL
NEUTROPHILS NFR BLD: 66.4 %
NRBC BLD-RTO: 0 /100 WBC
PLATELET # BLD AUTO: 196 K/UL
PMV BLD AUTO: 10.6 FL
POTASSIUM SERPL-SCNC: 3.5 MMOL/L
PROT SERPL-MCNC: 7 G/DL
RBC # BLD AUTO: 4.8 M/UL
SODIUM SERPL-SCNC: 139 MMOL/L
WBC # BLD AUTO: 5.43 K/UL

## 2018-01-16 PROCEDURE — 85025 COMPLETE CBC W/AUTO DIFF WBC: CPT | Mod: NTX

## 2018-01-16 PROCEDURE — 20600001 HC STEP DOWN PRIVATE ROOM: Mod: NTX

## 2018-01-16 PROCEDURE — 63600175 PHARM REV CODE 636 W HCPCS: Mod: NTX | Performed by: INTERNAL MEDICINE

## 2018-01-16 PROCEDURE — A4216 STERILE WATER/SALINE, 10 ML: HCPCS | Mod: NTX | Performed by: INTERNAL MEDICINE

## 2018-01-16 PROCEDURE — 25000003 PHARM REV CODE 250: Mod: NTX | Performed by: INTERNAL MEDICINE

## 2018-01-16 PROCEDURE — 83735 ASSAY OF MAGNESIUM: CPT | Mod: NTX

## 2018-01-16 PROCEDURE — 97161 PT EVAL LOW COMPLEX 20 MIN: CPT | Mod: NTX

## 2018-01-16 PROCEDURE — 36415 COLL VENOUS BLD VENIPUNCTURE: CPT | Mod: NTX

## 2018-01-16 PROCEDURE — 99232 SBSQ HOSP IP/OBS MODERATE 35: CPT | Mod: NTX,,, | Performed by: INTERNAL MEDICINE

## 2018-01-16 PROCEDURE — 80053 COMPREHEN METABOLIC PANEL: CPT | Mod: NTX

## 2018-01-16 RX ORDER — POTASSIUM CHLORIDE 20 MEQ/1
20 TABLET, EXTENDED RELEASE ORAL ONCE
Status: COMPLETED | OUTPATIENT
Start: 2018-01-16 | End: 2018-01-16

## 2018-01-16 RX ORDER — ENOXAPARIN SODIUM 100 MG/ML
40 INJECTION SUBCUTANEOUS EVERY 24 HOURS
Status: DISCONTINUED | OUTPATIENT
Start: 2018-01-16 | End: 2018-01-17 | Stop reason: HOSPADM

## 2018-01-16 RX ORDER — VALSARTAN 40 MG/1
40 TABLET ORAL 2 TIMES DAILY
Status: DISCONTINUED | OUTPATIENT
Start: 2018-01-16 | End: 2018-01-17 | Stop reason: HOSPADM

## 2018-01-16 RX ADMIN — ALLOPURINOL 100 MG: 100 TABLET ORAL at 05:01

## 2018-01-16 RX ADMIN — AMIODARONE HYDROCHLORIDE 200 MG: 200 TABLET ORAL at 08:01

## 2018-01-16 RX ADMIN — POTASSIUM CHLORIDE 40 MEQ: 1500 TABLET, EXTENDED RELEASE ORAL at 08:01

## 2018-01-16 RX ADMIN — CARVEDILOL 3.12 MG: 3.12 TABLET, FILM COATED ORAL at 08:01

## 2018-01-16 RX ADMIN — Medication 3 ML: at 01:01

## 2018-01-16 RX ADMIN — POTASSIUM CHLORIDE 20 MEQ: 1500 TABLET, EXTENDED RELEASE ORAL at 08:01

## 2018-01-16 RX ADMIN — LOSARTAN POTASSIUM 25 MG: 25 TABLET, FILM COATED ORAL at 08:01

## 2018-01-16 RX ADMIN — VALSARTAN 40 MG: 40 TABLET ORAL at 08:01

## 2018-01-16 RX ADMIN — FUROSEMIDE 20 MG/HR: 10 INJECTION, SOLUTION INTRAVENOUS at 04:01

## 2018-01-16 RX ADMIN — ALLOPURINOL 100 MG: 100 TABLET ORAL at 08:01

## 2018-01-16 RX ADMIN — ASPIRIN 81 MG: 81 TABLET, COATED ORAL at 09:01

## 2018-01-16 RX ADMIN — ENOXAPARIN SODIUM 40 MG: 100 INJECTION SUBCUTANEOUS at 05:01

## 2018-01-16 RX ADMIN — ALLOPURINOL 100 MG: 100 TABLET ORAL at 01:01

## 2018-01-16 NOTE — ASSESSMENT & PLAN NOTE
-Improving creatinine (1.9 < 2.0)   -Continue to follow renal functions.   -Follow up after increased the dose of Lasix drip

## 2018-01-16 NOTE — PROGRESS NOTES
[facility]  Cardiology  Progress Note    Patient Name: Tim Richards  MRN: [id number]  Admission Date: 1/12/2018  Hospital Length of Stay: 4 days  Code Status: Full Code   Attending Physician: Antonio Hadley Jr.,*   Primary Care Physician: Ja Méndez MD  Expected Discharge Date: 1/23/2018  Principal Problem:Acute combined systolic and diastolic heart failure    Subjective:     Hospital Course:   No notes on file    Interval History: Pt doing well. Denied CP, SOB or palpitation.     Review of Systems   Constitution: Negative.   HENT: Negative.    Cardiovascular: Negative.    Respiratory: Negative.      Objective:     Vital Signs (Most Recent):  Temp: 97.7 °F (36.5 °C) (01/16/18 1155)  Pulse: 80 (01/16/18 1155)  Resp: 16 (01/16/18 1155)  BP: 107/73 (01/16/18 1155)  SpO2: 97 % (01/16/18 1155) Vital Signs (24h Range):  Temp:  [97.2 °F (36.2 °C)-98.3 °F (36.8 °C)] 97.7 °F (36.5 °C)  Pulse:  [60-80] 80  Resp:  [16-18] 16  SpO2:  [93 %-97 %] 97 %  BP: ()/(56-73) 107/73     Weight: 116.4 kg (256 lb 9.9 oz)  Body mass index is 33.86 kg/m².     SpO2: 97 %  O2 Device (Oxygen Therapy): room air      Intake/Output Summary (Last 24 hours) at 01/16/18 1457  Last data filed at 01/16/18 0500   Gross per 24 hour   Intake              360 ml   Output              975 ml   Net             -615 ml       Lines/Drains/Airways     Peripheral Intravenous Line                 Peripheral IV - Single Lumen 12/21/17 1810 Left Antecubital 25 days         Peripheral IV - Single Lumen 01/13/18 1530 Right Hand 2 days         Peripheral IV - Single Lumen 01/15/18 1856 Right Forearm less than 1 day                Physical Exam   Constitutional: He is oriented to person, place, and time. He appears well-developed and well-nourished.   HENT:   Head: Normocephalic and atraumatic.   Mouth/Throat: Oropharynx is clear and moist.   Eyes: EOM are normal. Pupils are equal, round, and reactive to light.   Neck: Normal  range of motion. Neck supple. JVD (Upto mid neck ) present. No tracheal deviation present. No thyromegaly present.   Cardiovascular: Normal rate, regular rhythm and intact distal pulses.  Exam reveals no gallop and no friction rub.    No murmur heard.  Pulmonary/Chest: Effort normal and breath sounds normal. No respiratory distress. He has no wheezes. He has no rales. He exhibits no tenderness.   Abdominal: Soft. Bowel sounds are normal. He exhibits no distension and no mass. There is no rebound.   Musculoskeletal: Normal range of motion. He exhibits no edema or tenderness.   Neurological: He is alert and oriented to person, place, and time. He has normal reflexes. He displays normal reflexes. No cranial nerve deficit. He exhibits normal muscle tone. Coordination normal.   Skin: Skin is dry. No erythema. No pallor.   Psychiatric: He has a normal mood and affect. His behavior is normal. Judgment normal.   Nursing note and vitals reviewed.      Significant Labs:   CMP   Recent Labs  Lab 01/15/18  0545 01/16/18  0532    139   K 4.1 3.5   CL 97 101   CO2 31* 28   GLU 84 87   BUN 34* 34*   CREATININE 1.9* 1.9*   CALCIUM 9.6 9.5   PROT 8.0 7.0   ALBUMIN 3.5 3.3*   BILITOT 0.8 1.0   ALKPHOS 85 83   AST 38 23   ALT 22 22   ANIONGAP 10 10   ESTGFRAFRICA 46.2* 46.2*   EGFRNONAA 39.9* 39.9*   , CBC   Recent Labs  Lab 01/15/18  0545 01/16/18  0532   WBC 5.67 5.43   HGB 13.7* 13.5*   HCT 43.4 42.9    196    and INR No results for input(s): INR, PROTIME in the last 48 hours.    Significant Imaging: X-Ray: CXR: X-Ray Chest 1 View (CXR): No results found for this visit on 01/12/18.    Assessment and Plan:     Brief HPI: 52 YOAAM with NICMP admitted for ADHF and work up for advanced therapy work up.     * Acute combined systolic and diastolic heart failure    -NICMP with Stage D with NYHA 2 symptomatology  -Warm/wet on exam.  -Lasix dose increased to 20 mg/hr.   -Continue GDMT with Valsartan 40 mg BID and start on  Toprol at 12.5 mg daily  -Advanced to step-2 on advanced heart failure pathway           CKD (chronic kidney disease), stage III    -Improving creatinine (1.9 < 2.0)   -Continue to follow renal functions.   -Follow up after increased the dose of Lasix drip        PVT (paroxysmal ventricular tachycardia)    -ICD in place, without new event, will interrogate today  -Continue Amiodarone at current dose of 200 mg daily.   -Goal K > 4 and Mag > 2             VTE Risk Mitigation         Ordered     enoxaparin injection 40 mg  Daily     Route:  Subcutaneous        01/16/18 1125     Medium Risk of VTE  Once      01/12/18 2045          Vimal Bernal MD  Cardiology  [facility]

## 2018-01-16 NOTE — PROGRESS NOTES
"Admit Note     Met with patient and wife to assess needs. Patient is a 52 y.o.  male, admitted for for heart failure and advanced options workup per medical record.      Patient admitted from clinic visit on 1/12/2018.  At this time, patient presents as alert and oriented x 4, pleasant, calm, cooperative and asking and answering questions appropriately.  At this time, patients caregiver presents as alert and oriented x 4, pleasant, calm, cooperative and asking and answering questions appropriately.    Household/Family Systems     Patient resides with patient's wife, step-son (29 y/o), and three grandsons (ages 10, 11, and 12), at    [address] [place] [address] [address]  [place] [address]     Pt cell: [phone]  Kelly Richards (wife): [phone]    Support system includes wife and adult step-son.  Patient does not have dependents that are need of being cared for.  Pt reports his step-son and grandsons are staying with pt and wife temporarily.     Patients primary caregiver is self.  Patients wife Kelly will likely be caregiver for LVAD/transplant.  During admission, patient's caregiver plans to visit.  Confirmed patient and patients caregivers do have access to reliable transportation.    Cognitive Status/Learning     Patient reports reading ability as college and states patient does not have difficulty with reading, writing, hearing, comprehension and learning.  Patient reports he wears glasses.  Patient reports "small" memory issues.  Patient reports patient learns best by reading.   Needed: No.   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    Adherence     Patient reports a high level of adherence to patients health care regimen.  Adherence counseling and education provided. Patient verbalizes understanding.    Substance " Use    Patient reports the following substance usage.    Tobacco: Pt reports he started smoking at age 17.  Pt reports he used to smoke 1/2 ppd but was down to 1-2 cigarettes/day recently.  Pt reports quit smoking on 1/10/18.  Pt and wife report they think this hospitalization will aid pt in being done with smoking.  Alcohol: Patient reports social alcohol consumption.  Pt reports he usually drinks about 3x/wk.  Pt states he sometimes will have a drink with dinner.  Pt states when he drinks on weekends or socially (i.e. football game), he drinks up to 6 beers.  Illicit Drugs/Non-prescribed Medications: none, patient denies any use.  Patient states clear understanding of the potential impact of substance use.  Substance abstinence/cessation counseling, education and resources provided and reviewed.     Services Utilizing/ADLS    Infusion Service: Prior to admission, patient utilizing? no  Home Health: Prior to admission, patient utilizing? no  DME: Prior to admission, pt reports he sometimes uses a cane when he has a Gout flare  Pulmonary/Cardiac Rehab: Prior to admission, no  Dialysis:  Prior to admission, no  Transplant Specialty Pharmacy:  Prior to admission, n/a    Prior to admission, patient reports patient was independent with ADLS and was driving.  Patient reports patient is able to care for self at this time..  Patient indicates a willingness to care for self once medically cleared to do so.    Insurance/Medications    Insured by   Payor/Plan Subscr  Sex Relation Sub. Ins. ID Effective Group Num   1. AETNA - AETNA* JESUS PHELPS* 1965 Male  [id number]                                   [address]   2. MEDICAID - * JESUS PHELPS* 1965 Male  [id number] 12/1/15 [place] [facility]                                   [address] [address]      Primary Insurance (for UNOS reporting): Private Insurance  Secondary Insurance (for UNOS reporting): Public Insurance - Medicaid    Patient reports  patient is able to obtain and afford medications at this time and at time of discharge.    Living Will/Healthcare Power of     Patient states patient does not have a LW and/or HCPA.   provided education regarding LW and HCPA and the completion of forms.    Coping/Mental Health    Patient is coping adequately with the aid of  family members.  Patient denies mental health difficulties.     Discharge Planning    At time of discharge, patient plans to return to patient's home under the care of self.  Patients wife will transport patient.  Per rounds today, expected discharge date has not been medically determined at this time. Patient and patients caregiver verbalize understanding and are involved in treatment planning and discharge process.    Additional Concerns    Patient and caregiver verbalize understanding and agreement with information reviewed,  availability, and how to access available resources as needed.  Patient and caregiver deny additional needs or concerns at this time.  Patient is being followed for needs, education, resources, information, emotional support, supportive counseling, and for supportive and skilled discharge plan of care.  providing ongoing psychosocial support, education, resources and d/c planning as needed.  SW remains available.

## 2018-01-16 NOTE — PLAN OF CARE
Problem: Physical Therapy Goal  Goal: Physical Therapy Goal  Pt eval and D/C from PT due to being IND w/ all mobility.  Corrine Miller, PT  1/16/2018

## 2018-01-16 NOTE — PLAN OF CARE
Problem: Patient Care Overview  Goal: Plan of Care Review  Outcome: Ongoing (interventions implemented as appropriate)  Plan of care reviewed with pt. VS stable. No acute events at this time. No complaints. Lasix gtt maintained. Plan for RHC in AM. Pt remains free from falls or injury. Bed low and locked, call light and personal belongings within reach. Will continue to monitor. Kerri Franklin RN

## 2018-01-16 NOTE — PLAN OF CARE
Problem: Patient Care Overview  Goal: Individualization & Mutuality  Outcome: Ongoing (interventions implemented as appropriate)  Pt free of falls/traumas/injuries. Skin remains clean, dry, and intact. Pt continued on lasix gtt at 10 mg/hr. Pt continues to diurese.  Pt reminded on importance of measuring accurate intake and out put; pt verbalized and demonstrates understanding. Reviewed plan of care with pt; and pt verbalized understanding.  Pt VSS in no distress will continue to monitor.

## 2018-01-16 NOTE — SUBJECTIVE & OBJECTIVE
Interval History: Pt doing well. Denied CP, SOB or palpitation.     Review of Systems   Constitution: Negative.   HENT: Negative.    Cardiovascular: Negative.    Respiratory: Negative.      Objective:     Vital Signs (Most Recent):  Temp: 97.7 °F (36.5 °C) (01/16/18 1155)  Pulse: 80 (01/16/18 1155)  Resp: 16 (01/16/18 1155)  BP: 107/73 (01/16/18 1155)  SpO2: 97 % (01/16/18 1155) Vital Signs (24h Range):  Temp:  [97.2 °F (36.2 °C)-98.3 °F (36.8 °C)] 97.7 °F (36.5 °C)  Pulse:  [60-80] 80  Resp:  [16-18] 16  SpO2:  [93 %-97 %] 97 %  BP: ()/(56-73) 107/73     Weight: 116.4 kg (256 lb 9.9 oz)  Body mass index is 33.86 kg/m².     SpO2: 97 %  O2 Device (Oxygen Therapy): room air      Intake/Output Summary (Last 24 hours) at 01/16/18 1457  Last data filed at 01/16/18 0500   Gross per 24 hour   Intake              360 ml   Output              975 ml   Net             -615 ml       Lines/Drains/Airways     Peripheral Intravenous Line                 Peripheral IV - Single Lumen 12/21/17 1810 Left Antecubital 25 days         Peripheral IV - Single Lumen 01/13/18 1530 Right Hand 2 days         Peripheral IV - Single Lumen 01/15/18 1856 Right Forearm less than 1 day                Physical Exam   Constitutional: He is oriented to person, place, and time. He appears well-developed and well-nourished.   HENT:   Head: Normocephalic and atraumatic.   Mouth/Throat: Oropharynx is clear and moist.   Eyes: EOM are normal. Pupils are equal, round, and reactive to light.   Neck: Normal range of motion. Neck supple. JVD (Upto mid neck ) present. No tracheal deviation present. No thyromegaly present.   Cardiovascular: Normal rate, regular rhythm and intact distal pulses.  Exam reveals no gallop and no friction rub.    No murmur heard.  Pulmonary/Chest: Effort normal and breath sounds normal. No respiratory distress. He has no wheezes. He has no rales. He exhibits no tenderness.   Abdominal: Soft. Bowel sounds are normal. He exhibits  no distension and no mass. There is no rebound.   Musculoskeletal: Normal range of motion. He exhibits no edema or tenderness.   Neurological: He is alert and oriented to person, place, and time. He has normal reflexes. He displays normal reflexes. No cranial nerve deficit. He exhibits normal muscle tone. Coordination normal.   Skin: Skin is dry. No erythema. No pallor.   Psychiatric: He has a normal mood and affect. His behavior is normal. Judgment normal.   Nursing note and vitals reviewed.      Significant Labs:   CMP   Recent Labs  Lab 01/15/18  0545 01/16/18  0532    139   K 4.1 3.5   CL 97 101   CO2 31* 28   GLU 84 87   BUN 34* 34*   CREATININE 1.9* 1.9*   CALCIUM 9.6 9.5   PROT 8.0 7.0   ALBUMIN 3.5 3.3*   BILITOT 0.8 1.0   ALKPHOS 85 83   AST 38 23   ALT 22 22   ANIONGAP 10 10   ESTGFRAFRICA 46.2* 46.2*   EGFRNONAA 39.9* 39.9*   , CBC   Recent Labs  Lab 01/15/18  0545 01/16/18  0532   WBC 5.67 5.43   HGB 13.7* 13.5*   HCT 43.4 42.9    196    and INR No results for input(s): INR, PROTIME in the last 48 hours.    Significant Imaging: X-Ray: CXR: X-Ray Chest 1 View (CXR): No results found for this visit on 01/12/18.

## 2018-01-16 NOTE — PT/OT/SLP EVAL
Physical Therapy Evaluation and Discharge Note    Patient Name:  Tim Richards   MRN:  [id number]    Recommendations:     Discharge Recommendations:  home   Discharge Equipment Recommendations: none   Barriers to discharge: None    Assessment:     Tim Richards is a 52 y.o. male admitted with a medical diagnosis of Acute combined systolic and diastolic heart failure. .  At this time, patient is functioning at their prior level of function and does not require further acute PT services.     Recent Surgery: Procedure(s) (LRB):  Right heart cath (Right)      Plan:     During this hospitalization, patient does not require further acute PT services.  Please re-consult if situation changes.     Plan of Care Reviewed with: patient    Subjective     Communicated with nursing prior to session.  Patient found supine upon PT entry to room, agreeable to evaluation.      Chief Complaint: none  Patient comments/goals: to return home  Pain/Comfort:  · Pain Rating 1: 0/10    Patients cultural, spiritual, Presybeterian conflicts given the current situation: none reported    Living Environment:  Pt lives w/ his wife and 3 grandchildren in a 2SH w/ 4 SALVADOR w/o a rail. Pt's bed/bath are on the 2nd floor w/ 13 steps to get upstairs and (L) rail. Pt will use his walk-in shower upon D/C.   Prior to admission, patients level of function was IND w/o AD.  Patient has the following equipment: cane, straight (SPC only used occasionally if pt has gout flare up).  DME owned (not currently used): QC and rollator.  Upon discharge, patient will have assistance from his wife.    Objective:     Patient found with: peripheral IV     General Precautions: Standard, fall   Orthopedic Precautions:N/A   Braces: N/A     Exams:  · Cognitive Exam:  Patient is oriented to Person, Place, Time and Situation and follows 100% of verbal commands   · Fine Motor Coordination:    · -       Intact  · Gross Motor Coordination:  WFL  · Postural Exam:  Patient  presented with the following abnormalities:    · -       No postural abnormalities identified  · Sensation:    · -       Intact  · RLE ROM: WFL  · RLE Strength: WFL  · LLE ROM: WFL  · LLE Strength: WFL    Functional Mobility:  · Bed Mobility:     · Supine to Sit: independence  · Transfers:     · Sit to Stand:  independence with no AD  · Gait: 400ft IND w/o AD, no LOB    AM-PAC 6 CLICK MOBILITY  Total Score:24       Therapeutic Activities and Exercises:   Pt educated on importance of ambulating at least 4x/day while at hospital in order to maintain current level of functional mobility. Pt verb understanding.   Pt educated on PT D/C due to being IND. Pt in agreement and verb understanding.    Patient left sitting EOB with all lines intact and call button in reach.    GOALS:    Physical Therapy Goals        Problem: Physical Therapy Goal    Goal Priority Disciplines Outcome Goal Variances Interventions   Physical Therapy Goal     PT/OT, PT      Description:  Pt eval and D/C from PT due to being IND w/ all mobility.                    History:     Past Medical History:   Diagnosis Date    CHF (congestive heart failure)     Dilated cardiomyopathy 1/10/2018    Disorder of kidney and ureter     CKD    Gout     HTN (hypertension)     Ventricular tachycardia (paroxysmal)        Past Surgical History:   Procedure Laterality Date    CARDIAC CATHETERIZATION  Dec. 2012    CARDIAC DEFIBRILLATOR PLACEMENT Left     CRRT-D       Clinical Decision Making:     History  Co-morbidities and personal factors that may impact the plan of care Examination  Body Structures and Functions, activity limitations and participation restrictions that may impact the plan of care Clinical Presentation   Decision Making/ Complexity Score   Co-morbidities:   [] Time since onset of injury / illness / exacerbation  [] Status of current condition  []Patient's cognitive status and safety concerns    [] Multiple Medical Problems (see med  hx)  Personal Factors:   [] Patient's age  [] Prior Level of function   [] Patient's home situation (environment and family support)  [] Patient's level of motivation  [] Expected progression of patient      HISTORY:(criteria)    [x] 29985 - no personal factors/history    [] 22791 - has 1-2 personal factor/comorbidity     [] 22076 - has >3 personal factor/comorbidity     Body Regions:  [] Objective examination findings  [] Head     []  Neck  [] Trunk   [] Upper Extremity  [] Lower Extremity    Body Systems:  [] For communication ability, affect, cognition, language, and learning style: the assessment of the ability to make needs known, consciousness, orientation (person, place, and time), expected emotional /behavioral responses, and learning preferences (eg, learning barriers, education  needs)  [] For the neuromuscular system: a general assessment of gross coordinated movement (eg, balance, gait, locomotion, transfers, and transitions) and motor function  (motor control and motor learning)  [] For the musculoskeletal system: the assessment of gross symmetry, gross range of motion, gross strength, height, and weight  [] For the integumentary system: the assessment of pliability(texture), presence of scar formation, skin color, and skin integrity  [] For cardiovascular/pulmonary system: the assessment of heart rate, respiratory rate, blood pressure, and edema     Activity limitations:    [] Patient's cognitive status and saf ety concerns          [] Status of current condition      [] Weight bearing restriction  [] Cardiopulmunary Restriction    Participation Restrictions:   [] Goals and goal agreement with the patient     [] Rehab potential (prognosis) and probable outcome      Examination of Body System: (criteria)    [] 71083 - addressing 1-2 elements    [] 36797 - addressing a total of 3 or more elements     [] 38943 -  Addressing a total of 4 or more elements         Clinical Presentation: (criteria)  Stable -  66381     On examination of body system using standardized tests and measures patient presents with (CHOOSE ONE) elements from any of the following: body structures and functions, activity limitations, and/or participation restrictions.  Leading to a clinical presentation that is considered stable and/or uncomplicated                              Clinical Decision Making  (Eval Complexity):  Low- 04440     Time Tracking:     PT Received On: 01/16/18  PT Start Time: 1010     PT Stop Time: 1016  PT Total Time (min): 6 min     Billable Minutes: Evaluation 6 and Total Time 6      Corrine Miller, PT  01/16/2018

## 2018-01-16 NOTE — ASSESSMENT & PLAN NOTE
-NICMP with Stage D with NYHA 2 symptomatology  -Warm/wet on exam.  -Lasix dose increased to 20 mg/hr.   -Continue GDMT with Valsartan 40 mg BID and start on Toprol at 12.5 mg daily  -Advanced to step-2 on advanced heart failure pathway

## 2018-01-16 NOTE — ASSESSMENT & PLAN NOTE
-ICD in place, without new event, will interrogate today  -Continue Amiodarone at current dose of 200 mg daily.   -Goal K > 4 and Mag > 2

## 2018-01-16 NOTE — TELEPHONE ENCOUNTER
Pt reports additional insurance [employer].  Per Hanane (financial coordiantor), pt's Medicaid will back date with cancellation.  Clinical and LOMN given to Hanane to gain auth for inpatient eval from [employer]. Copy of pt's insurance card received via fax from pt's wife, copies given to Hanane

## 2018-01-16 NOTE — PROGRESS NOTES
EDUCATION NOTE:    Tim Richards was seen today for pre-heart transplant education.  Patient signed wellness contract and informed consent to undergo heart transplant evaluation work-up.  Thorough pre-transplant education conducted.      Information presented included:  · Evaluation process  · Members of the transplant team  · Selection committee members and role of the committee  · Listing process for transplant  · Different listing designations, including status 7  · 1-year graft survival statistics  · LVAD as bridge to transplant or DT  · Need to reach patient within 15 minutes of donor offer  · CDC high risk donors  · Blood transfusions  · Process for matching donor with recipient  · Need for weight loss and how it relates to the wait time  · Post-transplant immunosuppression for life with need to be able to afford post-transplant medications  · Need for a caregiver to be with them at all times beginning with discharge from ICU, through at least the first 6 weeks post-transplant  · Need to find local housing for the first 6 weeks post-transplant  · How to reach team members at any time  · UNOS website with written instructions regarding how to look up information specific to [facility]      Patient asked pertinent questions, which were answered to their satisfaction.  Patient was also given a copy of the wellness contract and informed consent to undergo evaluation work-up.    Pt reports never having a colonoscopy previously.      He is anxious to be discharged and get back to work.  Advised pt that a discharge date can not be set until after he's had RHC to determine additional medical interventions needed.  Pt voiced understanding.

## 2018-01-17 VITALS
HEART RATE: 75 BPM | WEIGHT: 256.63 LBS | BODY MASS INDEX: 34.01 KG/M2 | OXYGEN SATURATION: 97 % | HEIGHT: 73 IN | DIASTOLIC BLOOD PRESSURE: 63 MMHG | RESPIRATION RATE: 20 BRPM | SYSTOLIC BLOOD PRESSURE: 98 MMHG | TEMPERATURE: 98 F

## 2018-01-17 DIAGNOSIS — I50.9 CONGESTIVE HEART FAILURE, UNSPECIFIED CONGESTIVE HEART FAILURE CHRONICITY, UNSPECIFIED CONGESTIVE HEART FAILURE TYPE: Primary | ICD-10-CM

## 2018-01-17 LAB
ALBUMIN SERPL BCP-MCNC: 3.4 G/DL
ALP SERPL-CCNC: 88 U/L
ALT SERPL W/O P-5'-P-CCNC: 22 U/L
ANION GAP SERPL CALC-SCNC: 9 MMOL/L
AST SERPL-CCNC: 23 U/L
BASOPHILS # BLD AUTO: 0.01 K/UL
BASOPHILS NFR BLD: 0.2 %
BILIRUB SERPL-MCNC: 1 MG/DL
BUN SERPL-MCNC: 33 MG/DL
CALCIUM SERPL-MCNC: 9.5 MG/DL
CHLORIDE SERPL-SCNC: 102 MMOL/L
CO2 SERPL-SCNC: 30 MMOL/L
CREAT SERPL-MCNC: 1.9 MG/DL
DIFFERENTIAL METHOD: ABNORMAL
EOSINOPHIL # BLD AUTO: 0.1 K/UL
EOSINOPHIL NFR BLD: 1.4 %
ERYTHROCYTE [DISTWIDTH] IN BLOOD BY AUTOMATED COUNT: 15.5 %
EST. GFR  (AFRICAN AMERICAN): 46.2 ML/MIN/1.73 M^2
EST. GFR  (NON AFRICAN AMERICAN): 39.9 ML/MIN/1.73 M^2
GLUCOSE SERPL-MCNC: 71 MG/DL
HCT VFR BLD AUTO: 43.5 %
HGB BLD-MCNC: 13.4 G/DL
IMM GRANULOCYTES # BLD AUTO: 0.01 K/UL
IMM GRANULOCYTES NFR BLD AUTO: 0.2 %
LYMPHOCYTES # BLD AUTO: 1.1 K/UL
LYMPHOCYTES NFR BLD: 21.9 %
MAGNESIUM SERPL-MCNC: 2 MG/DL
MCH RBC QN AUTO: 27.8 PG
MCHC RBC AUTO-ENTMCNC: 30.8 G/DL
MCV RBC AUTO: 90 FL
MONOCYTES # BLD AUTO: 0.7 K/UL
MONOCYTES NFR BLD: 13.7 %
NEUTROPHILS # BLD AUTO: 3.1 K/UL
NEUTROPHILS NFR BLD: 62.6 %
NRBC BLD-RTO: 0 /100 WBC
PLATELET # BLD AUTO: 215 K/UL
PMV BLD AUTO: 11.4 FL
POTASSIUM SERPL-SCNC: 3.6 MMOL/L
PROT SERPL-MCNC: 7.3 G/DL
RBC # BLD AUTO: 4.82 M/UL
SODIUM SERPL-SCNC: 141 MMOL/L
WBC # BLD AUTO: 4.98 K/UL

## 2018-01-17 PROCEDURE — C1894 INTRO/SHEATH, NON-LASER: HCPCS | Mod: NTX

## 2018-01-17 PROCEDURE — 25000003 PHARM REV CODE 250: Mod: NTX | Performed by: INTERNAL MEDICINE

## 2018-01-17 PROCEDURE — 99239 HOSP IP/OBS DSCHRG MGMT >30: CPT | Mod: NTX,,, | Performed by: INTERNAL MEDICINE

## 2018-01-17 PROCEDURE — 93451 RIGHT HEART CATH: CPT | Mod: 26,NTX,, | Performed by: INTERNAL MEDICINE

## 2018-01-17 PROCEDURE — 25000003 PHARM REV CODE 250: Mod: NTX

## 2018-01-17 PROCEDURE — 80053 COMPREHEN METABOLIC PANEL: CPT | Mod: NTX

## 2018-01-17 PROCEDURE — 63600175 PHARM REV CODE 636 W HCPCS: Mod: NTX | Performed by: INTERNAL MEDICINE

## 2018-01-17 PROCEDURE — 4A023N6 MEASUREMENT OF CARDIAC SAMPLING AND PRESSURE, RIGHT HEART, PERCUTANEOUS APPROACH: ICD-10-PCS | Performed by: INTERNAL MEDICINE

## 2018-01-17 PROCEDURE — 36415 COLL VENOUS BLD VENIPUNCTURE: CPT | Mod: NTX

## 2018-01-17 PROCEDURE — 83735 ASSAY OF MAGNESIUM: CPT | Mod: NTX

## 2018-01-17 PROCEDURE — 85025 COMPLETE CBC W/AUTO DIFF WBC: CPT | Mod: NTX

## 2018-01-17 RX ORDER — SPIRONOLACTONE 25 MG/1
25 TABLET ORAL DAILY
Qty: 30 TABLET | Refills: 0 | Status: SHIPPED | OUTPATIENT
Start: 2018-01-17 | End: 2018-01-17

## 2018-01-17 RX ORDER — POTASSIUM CHLORIDE 1125 MG/1
30 TABLET, EXTENDED RELEASE ORAL 2 TIMES DAILY
Qty: 60 TABLET | Refills: 1 | Status: SHIPPED | OUTPATIENT
Start: 2018-01-17 | End: 2018-01-17

## 2018-01-17 RX ORDER — SPIRONOLACTONE 25 MG/1
25 TABLET ORAL DAILY
Qty: 30 TABLET | Refills: 0 | Status: ON HOLD | OUTPATIENT
Start: 2018-01-17 | End: 2018-03-26 | Stop reason: HOSPADM

## 2018-01-17 RX ORDER — BUMETANIDE 1 MG/1
2 TABLET ORAL 2 TIMES DAILY
Status: DISCONTINUED | OUTPATIENT
Start: 2018-01-17 | End: 2018-01-17 | Stop reason: HOSPADM

## 2018-01-17 RX ORDER — METOPROLOL SUCCINATE 25 MG/1
12.5 TABLET, EXTENDED RELEASE ORAL DAILY
Qty: 30 TABLET | Refills: 2 | Status: SHIPPED | OUTPATIENT
Start: 2018-01-18 | End: 2018-01-17

## 2018-01-17 RX ORDER — SPIRONOLACTONE 25 MG/1
25 TABLET ORAL DAILY
Status: DISCONTINUED | OUTPATIENT
Start: 2018-01-17 | End: 2018-01-17 | Stop reason: HOSPADM

## 2018-01-17 RX ORDER — METOPROLOL SUCCINATE 25 MG/1
12.5 TABLET, EXTENDED RELEASE ORAL DAILY
Qty: 30 TABLET | Refills: 2 | Status: ON HOLD | OUTPATIENT
Start: 2018-01-18 | End: 2018-03-26 | Stop reason: HOSPADM

## 2018-01-17 RX ORDER — POTASSIUM CHLORIDE 1125 MG/1
30 TABLET, EXTENDED RELEASE ORAL 2 TIMES DAILY
Qty: 60 TABLET | Refills: 1 | Status: SHIPPED | OUTPATIENT
Start: 2018-01-17 | End: 2018-01-18 | Stop reason: SDUPTHER

## 2018-01-17 RX ORDER — BUMETANIDE 2 MG/1
2 TABLET ORAL 2 TIMES DAILY
Qty: 60 TABLET | Refills: 11 | Status: SHIPPED | OUTPATIENT
Start: 2018-01-17 | End: 2018-01-19 | Stop reason: SDUPTHER

## 2018-01-17 RX ADMIN — POTASSIUM CHLORIDE 40 MEQ: 1500 TABLET, EXTENDED RELEASE ORAL at 08:01

## 2018-01-17 RX ADMIN — ALLOPURINOL 100 MG: 100 TABLET ORAL at 05:01

## 2018-01-17 RX ADMIN — METOPROLOL SUCCINATE 12.5 MG: 25 TABLET, EXTENDED RELEASE ORAL at 08:01

## 2018-01-17 RX ADMIN — AMIODARONE HYDROCHLORIDE 200 MG: 200 TABLET ORAL at 08:01

## 2018-01-17 RX ADMIN — VALSARTAN 40 MG: 40 TABLET ORAL at 08:01

## 2018-01-17 RX ADMIN — FUROSEMIDE 20 MG/HR: 10 INJECTION, SOLUTION INTRAVENOUS at 05:01

## 2018-01-17 RX ADMIN — ASPIRIN 81 MG: 81 TABLET, COATED ORAL at 08:01

## 2018-01-17 NOTE — PROGRESS NOTES
DISCHARGE    Pt discharged today unknown to SW.  Pt denied any d/c needs during SW admit assessment on 1/15, and no needs identified by medical team.  SW unable to assess pt's coping today.  Per pre-tx nurse anam Ybarra, auth will be requested for outpt eval for pt.  Once auth is obtained, SW will see pt in clinic for full psychosocial eval.  SW remains available.

## 2018-01-17 NOTE — PLAN OF CARE
Problem: Patient Care Overview  Goal: Plan of Care Review  Outcome: Ongoing (interventions implemented as appropriate)  Updated plan of care d/c pending.  Pt remains free from injury. Address all needs throughout shift.

## 2018-01-17 NOTE — NURSING
PT WAS GIVEN A COPY OF HOME CARE DISCHARGE INFORMATION- AVS SIGNED IN [facility].   PT WAS GIVEN A COPY OF HOME CARE MEDICATION SHEET WITH NEXT DOSE WRITTEN ON SHEET. IV AND TELE REMOVED. PT VERBALIZE COMPLETE UNDERSTANDING OF HOME CARE DISCHARGE INSTRUCTIONS AND IMPORTANCE OF FOLLOW UP CARE W/ HTS.  REINFORCE FLUID RESTRICITION- 1.5L IN 24 HRS. ESCORT NOTIFIED OF DISCHARGE.    PT VOICE NO COMPLAINTS UPON RECEIVING D./C HOME CARE INSTRUCTIONS.

## 2018-01-17 NOTE — INTERVAL H&P NOTE
Patient seen and examined. No interval change since last H&P. Here for a RHC to assess his hemodynamics.   Access via the right IJ  7 Fr venous sheath  Risks and benefits of the procedure were explained to the patient. All questions were answered.  Consents were signed and in the chart.    Guerda Bautista MD

## 2018-01-17 NOTE — PROGRESS NOTES
Per Dr Hadley, pt insisted on being discharged after RHC but will need labs on Friday (Kristina Esquivel per pt request) and clinic next week (430pm appt per pt request).    Spoke with pt to schedule labs Friday and clinic/labs 1/25/18.       notified to change request for eval to outpatient.

## 2018-01-17 NOTE — PLAN OF CARE
Problem: Patient Care Overview  Goal: Plan of Care Review  Outcome: Ongoing (interventions implemented as appropriate)  Plan of care reviewed with pt. VS stable. No acute events at this time. Lasix gtt maintained. Plan for possible RHC in AM. No complaints. Pt remains free from falls or injury. Bed low and locked, call light and personal belongings within reach. Will continue to monitor. Kerri Franklin RN

## 2018-01-17 NOTE — DISCHARGE SUMMARY
[facility]  Cardiology  Discharge Summary      Patient Name: Tim Richards  MRN: [id number]  Admission Date: 1/12/2018  Hospital Length of Stay: 5 days  Discharge Date and Time:  01/17/2018 11:44 AM  Attending Physician: Antonio Hadley Jr.,*  Discharging Provider: Vimal Bernal MD  Primary Care Physician: Ja Méndez MD    HPI:   Mr. Richards is a 52 YOAAM with CHF stage D, S/p, CRT and  ICD secondary to VT and multiple interrogation  There after showing Vt, hx of alcohol use and medical therapy noncompliance presenting from home after being seen in clinic 3 days prior to admission for advanced heart failure therapy work up. During that visit, his renal function was found to be at baseline of 2 while BNP was also significantly elevated at 2012.  His LFTs were also notable for  Mild elevation of AST and ALT which was slightly better from previous levels.     He has [place] [facility] [place] 2011 when he was diagnosed with dialted cardiomyopathy and low EF of 25% after a cardiac cath that revealed non obstructive coronaries. He had an ICD placed on 10/31/2014 for episodic VT . Last  Echo on file was three months ago and his EF was 10% with moderate mitral valve insufficient.    Patient was [place] to [facility] [place]AD/OHT workup . He reported a lot of fatigue at home and at work although he is mostly seated at workplace. His appetite was also decreased without weight loss, leg edema, constipation, PND or orthopnea. He also denied ICD misfiring, chest pain or palpitation. There is a order for colonoscopy in file in 2017 but there is no evidence of it having been done.         Procedure(s) (LRB):  Right heart cath (Right)     Indwelling Lines/Drains at time of discharge:  Lines/Drains/Airways          No matching active lines, drains, or airways          Hospital Course (synopsis of major diagnoses, care, treatment, and services provided during the course of the  hospital stay):   Pt was admitted on 01/12/2018 for acute decompensated heart failure with volume overload. He was started on Lasix drip at 10 mg/hr after giving a push of 80 mg.   His medication were reconciled and he was initiated on Lisinopril 25 mg daily and his Coreg was held  He started making urine and his creatinine trended down from 2.0 to 1.8 and then plateaued at 1.9 for the last three days.      He was started on [facility] for advanced therapy options. His financial clearance was still a pending issue.He was cleared by Cardiothoracic surgery for the advanced therapy related procedures.      As part of advanced therapy work up, he underwent right heart cath which showed a cardiac index of 1.5 and Wedge pressure of 35 with RA of 17. The management plan in the setting of advanced heart failure was outlined. We explained to him his renal functions needs to get better and could be improved by using an inotropic support.     During his four days stay, he was concerned about his job that he got after a long wait and he wants to join the job tomorrow to maintain his health insurance.   Although he is asymptomatic and is walking around daily, we explained the need for inotropic support and need for IV Lasix infusion.     However, due to his only job related insurance is also important. He does carry the risk of decompensation of his congestive heart failure and complications thereof, including fatal arrhythmias.However, given the nature of his social needs, we will optimize the medical therapy to mitigate the risk.   After initiation of Bumex 2 mg BID today, Aldactone 25 and KCl of 30 BID and that he will check his BNP on Firday and report to us on Saturday by calling the hospital  and to ask for Dr. Hadley.     He was also started on Valsartan 40 BID and Metoprolol succinate 12.5 daily as part of the medical therapy.         Consults:   Consults         Status Ordering Provider     Inpatient consult to  Cardiothoracic Surgery  Once     Provider:  (Not yet assigned)    Completed CLAY BAUMAN     Inpatient Consult to Pre-VAD Coordinator  Once     Provider:  (Not yet assigned)    Completed DIONICIO CANO     Inpatient Consult to Transplant Coordinator  Once     Provider:  (Not yet assigned)    Completed DIONICIO CANO          Significant Diagnostic Studies: Labs:   CMP   Recent Labs  Lab 01/16/18  0532 01/17/18  0502    141   K 3.5 3.6    102   CO2 28 30*   GLU 87 71   BUN 34* 33*   CREATININE 1.9* 1.9*   CALCIUM 9.5 9.5   PROT 7.0 7.3   ALBUMIN 3.3* 3.4*   BILITOT 1.0 1.0   ALKPHOS 83 88   AST 23 23   ALT 22 22   ANIONGAP 10 9   ESTGFRAFRICA 46.2* 46.2*   EGFRNONAA 39.9* 39.9*   , CBC   Recent Labs  Lab 01/16/18  0532 01/17/18  0502   WBC 5.43 4.98   HGB 13.5* 13.4*   HCT 42.9 43.5    215    and Lipid Panel   Lab Results   Component Value Date    CHOL 137 12/23/2017    HDL 45 12/23/2017    LDLCALC 70.4 12/23/2017    TRIG 108 12/23/2017    CHOLHDL 32.8 12/23/2017     Radiology: X-Ray: CXR: X-Ray Chest 1 View (CXR): No results found for this visit on 01/12/18.    Pending Diagnostic Studies:     None          Final Active Diagnoses:    Diagnosis Date Noted POA    PRINCIPAL PROBLEM:  Acute on chronic combined systolic and diastolic heart failure [I50.43] 09/23/2015 Yes    Hypertensive cardiovascular-renal disease, stage 1-4 or unspecified chronic kidney disease, with heart failure [I13.0]  Yes    CKD (chronic kidney disease), stage III [N18.3] 01/12/2018 Yes    PVT (paroxysmal ventricular tachycardia) [I47.2] 01/10/2018 Yes    Dilated cardiomyopathy [I42.0] 01/10/2018 Yes    High risk medications (amiodarone) long-term use [Z79.899] 01/10/2018 Not Applicable    Biventricular implantable cardioverter-defibrillator in situ [Z95.810] 11/20/2014 Yes      Problems Resolved During this Admission:    Diagnosis Date Noted Date Resolved POA    Hypertension, well controlled [I10]  01/13/2018  Yes       Discharged Condition: stable    Follow Up:  Follow-up Information     Antonio Hadley Jr, MD.    Specialties:  Transplant, Cardiology  Contact information:  [address] MOLINA  [facility] [id number] [phone]                 Patient Instructions:   No discharge procedures on file.  Medications:  Reconciled Home Medications:   Current Discharge Medication List      START taking these medications    Details   bumetanide (BUMEX) 2 MG tablet Take 1 tablet (2 mg total) by mouth 2 (two) times daily.  Qty: 60 tablet, Refills: 11      metoprolol succinate (TOPROL-XL) 25 MG 24 hr tablet Take 0.5 tablets (12.5 mg total) by mouth once daily.  Qty: 30 tablet, Refills: 2      spironolactone (ALDACTONE) 25 MG tablet Take 1 tablet (25 mg total) by mouth once daily.  Qty: 30 tablet, Refills: 0         CONTINUE these medications which have CHANGED    Details   potassium chloride SA (KLOR-CON M15) 15 MEQ tablet Take 2 tablets (30 mEq total) by mouth 2 (two) times daily.  Qty: 60 tablet, Refills: 1    Associated Diagnoses: Hyperkalemia         CONTINUE these medications which have NOT CHANGED    Details   allopurinol (ZYLOPRIM) 100 MG tablet TAKE 1 TABLET (100 MG TOTAL) BY MOUTH 3 (THREE) TIMES DAILY.  Qty: 90 tablet, Refills: 1    Associated Diagnoses: Hyperuricemia      amiodarone (PACERONE) 200 MG Tab TAKE 1 TABLET (200 MG TOTAL) BY MOUTH ONCE DAILY.  Qty: 30 tablet, Refills: 7      aspirin (ECOTRIN) 81 MG EC tablet Take 81 mg by mouth. 1 Tablet, Delayed Release (E.C.) Oral Every day      valsartan (DIOVAN) 80 MG tablet Take 0.5 tablets (40 mg total) by mouth 2 (two) times daily.  Qty: 30 tablet, Refills: 1    Comments: Eliminate refills on losartan  Associated Diagnoses: Chronic combined systolic and diastolic congestive heart failure      VITAMIN E ACETATE (VITAMIN E ORAL) Take by mouth once daily.      ERGOCALCIFEROL, VITAMIN D2, (VITAMIN D ORAL) Take by mouth once daily.         STOP taking these medications        carvedilol (COREG) 3.125 MG tablet Comments:   Reason for Stopping:         furosemide (LASIX) 80 MG tablet Comments:   Reason for Stopping:         metOLazone (ZAROXOLYN) 2.5 MG tablet Comments:   Reason for Stopping:               Time spent on the discharge of patient: 35 minutes    Vimal Bernal MD  Cardiology  [facility]

## 2018-01-17 NOTE — PHYSICIAN QUERY
PT Name: Tim Richards  MR #: [id number]     Physician Query Form - Documentation Clarification      CDS/: Ariana Jefferson RN, CCDS              Contact information:[email or web address]    This form is a permanent document in the medical record.     Query Date: January 17, 2018    By submitting this query, we are merely seeking further clarification of documentation. Please utilize your independent clinical judgment when addressing the question(s) below.    The Medical record reflects the following:    Supporting Clinical Findings Location in Medical Record     K+3.1    Potassium Chloride 40 meq po x1       1/14 lab    1/14 mar                                                                                      Doctor, Please specify diagnosis or diagnoses associated with above clinical findings.    Provider Use Only      (XXX    )  Hypokalemia    (   )  Other______________                                                                                                                         [  ] Clinically undetermined

## 2018-01-17 NOTE — BRIEF OP NOTE
[facility]  Brief Operative Note  Cardiology    SUMMARY     Surgery Date: 1/17/2018     Surgeon(s) and Role:     * Guerda Bautista MD - Primary    Assisting Surgeon: None    Pre-op Diagnosis:  CHF (congestive heart failure) [I50.9]    Post-op Diagnosis: ADHF    Procedure Performed: RHC    Description of the findings of the procedure: RHC performed via the right IJ. Patient tolerated the procedure well. Significantly elevated left and right side filling pressures (RA= 17 mm of Hg, PCWP=35 mm of Hg (wedge confirmed by fluoro). Severe pulmonary HTN  (PA=70/35 mm of Hg, PA mean=46) in the setting of elevated left sided filling pressures. Low cardiac index and output  (CI=1.55 L/min/m2, CO=3.71 L/min) not on inotropes.    Estimated Blood Loss: < 10 cc    Plan:   - Routine post-cath care  - Continue aggressive IV diuresis and consider inotrope therapy.  - VAD/Tx work-up as per HTS team    Guerda Bautista MD

## 2018-01-18 ENCOUNTER — PATIENT OUTREACH (OUTPATIENT)
Dept: ADMINISTRATIVE | Facility: CLINIC | Age: 53
End: 2018-01-18

## 2018-01-18 DIAGNOSIS — E87.5 HYPERKALEMIA: ICD-10-CM

## 2018-01-18 RX ORDER — POTASSIUM CHLORIDE 750 MG/1
30 TABLET, EXTENDED RELEASE ORAL 2 TIMES DAILY
Qty: 180 TABLET | Refills: 2 | Status: SHIPPED | OUTPATIENT
Start: 2018-01-18 | End: 2018-01-19 | Stop reason: SDUPTHER

## 2018-01-18 NOTE — PHYSICIAN QUERY
PT Name: Tim Richards  MR #: [id number]     Physician Query Form - Documentation Clarification      CDS/: Ariana Jefferson RN, CCDS             Contact information: [email or web address]    This form is a permanent document in the medical record.     Query Date: January 18, 2018    By submitting this query, we are merely seeking further clarification of documentation. Please utilize your independent clinical judgment when addressing the question(s) below.    The Medical record reflects the following:    Supporting Clinical Findings Location in Medical Record     Severe Pulmonary HTN   (PA=70/35 mm of Hg, PA mean=46) in the setting of elevated left sided filling pressures     Brief op note 1/17     Dilated cardiomyopathy  Acute on chronic combined systolic and diastolic HF       1/12 h/p                                                                            Doctor, Please specify diagnosis or diagnoses associated with above clinical findings.  Please further specify Pulm htn.    Provider Use Only        Please specify the type of Pulmonary Hypertension:      [  xxxxxxx   ] Pulmonary hypertension due to Left  heart disease (Group 2)  [     ] Pulmonary hypertension, unspecified   [     ] Other: ___________________                                                                                                                       [  ] Clinically undetermined

## 2018-01-18 NOTE — TELEPHONE ENCOUNTER
auth received for outpatient eval as pt was discharged prior to start of evaluation.    Orders entered.

## 2018-01-18 NOTE — PATIENT INSTRUCTIONS
Discharge Instructions for Heart Failure  The heart is a muscle that pumps oxygen-rich blood to all parts of the body. When you have heart failure, the heart is not able to pump as well as it should. Blood and fluid may back up into the lungs (congestive heart failure), and some parts of the body dont get enough oxygen-rich blood to work normally. These problems lead to the symptoms of heart failure. Heart failure can occur due to an injury to the heart or from natural processes.  You can control symptoms of heart failure with some lifestyle changes and by following your doctor's advice.  Home care  Activity  Ask your healthcare provider about an exercise program. You can benefit from simple activities such as walking or gardening. Exercising most days of the week can make you feel better. Don't be discouraged if your progress is slow at first. Rest as needed. Stop activity if you develop symptoms such as chest pain, lightheadedness, or significant shortness of breath. Find activities that you enjoy, such as brisk walking, dancing, swimming, or gardening. These will help you stay active and strengthen your heart.  Diet  Follow a heart healthy diet. And make sure to limit the salt (sodium) in your diet. Salt causes your body to hold water. This makes your heart work harder as there is more fluid for the heart to pump. Limit your salt by doing the following:  · Limit canned, dried, packaged, and fast foods.  · Don't add salt to your food.  · Season foods with herbs instead of salt.  · Watch how much liquids you drink. Drinking too much can make heart failure worse. Talk with your health care provider about how much you should drink each day.  · Limit the amount of alcohol you drink. It may harm your heart. Women should have no more than 1 drink a day and men should have no more than 2.  · When you eat out, request that your meals have no added salt.  Tobacco  If you smoke, it's very important to quit. Smoking  increases your chances of having a heart attack by harming the blood vessels that provide oxygen to your heart. This makes heart failure worse. Quitting smoking is the number one thing you can do to improve your health. Enroll in a stop-smoking program to improve your chances of success. Talk with your healthcare provider about medicines or nicotine replacement therapy to help you quit smoking. Ask your healthcare provider about smoking cessation support groups.  Medicine  Take your medicines exactly as prescribed. Learn the names and purpose of each of your medicines. Keep an accurate medicine list and current dosages with you at all times. Don't skip doses. If you miss a dose of your medicine, take it as soon as you remember. If you miss a dose and it's almost time for your next dose, just wait and take your next dose at the normal time. Don't take a double dose. If you are unsure, call your doctor's office. Make sure not to mix up your medicines or forget what you've taken the same day.  Weight monitoring  Weigh yourself every day. A sudden weight gain can mean your heart failure is getting worse. Weigh yourself at the same time of day and in the same kind of clothes. Ideally, weigh yourself first thing in the morning after you empty your bladder, but before you eat breakfast. Your healthcare provider will show you how to track your weight. He or she will also discuss with you when you should call if you have a sudden, unexpected increase in your weight.  In general, your healthcare provider may ask you to report if your weight goes up by more than 2 pounds in 1 day,  5 pounds in 1 week, or whatever weight gain you were told by your doctor. This is a sign that you are retaining more fluid than you should be. Clues to weight gain include checking your ankles for swelling, or noticing you are short of breath when you lie down.  Follow-up care  Make a follow-up appointment as directed. Depending on the type and  severity of heart failure you have, you may need follow-up as early as 7 days from hospital discharge. Keep appointments for checkups and lab tests that are needed to check your medicines and condition.  Recognize that your health and even survival depend on your following medical recommendations.  Symptoms  Heart failure can cause a variety of symptoms, including:  · Shortness of breath  · Trouble breathing at night, especially when you lie down  · Swelling in the legs and feet or in the belly (abdomen)  · Becoming easily fatigued  · Irregular or rapid heartbeat  · Weakness or lightheadedness  · Swelling of the neck veins  It is important to know what to do if symptoms get worse or if you develop signs of worsening heart failure.     When to see your healthcare provider  Call your doctor right away if you have any of these signs of worsening heart failure:  · Sudden weight gain (more than 2 pounds in 1 day or 5 pounds in 1 week, or whatever weight gain you were told to report by your doctor)  · Trouble breathing not related to being active  · New or increased swelling of your legs or ankles  · Swelling or pain in your abdomen  · Breathing trouble at night (waking up short of breath, needing more pillows to breathe)  · Frequent coughing that doesn't go away  · Feeling much more tired than usual  Call 911  Call 911 right away if you have:  · Severe shortness of breath, such that you can't catch your breath even while resting  · Severe chest pain that does not resolve with rest or nitroglycerin  · Pink, foamy mucus with cough and shortness of breath  · A continuous rapid or irregular heartbeat  · Passing out or fainting  · Stroke symptoms such as sudden numbness or weakness on one side of your face, arm, or leg or sudden confusion, trouble speaking or vision changes   Date Last Reviewed: 3/21/2016  © 3368-5930 [employer]. [address] [place] [address], [place] [address]. All rights reserved. This information  is not intended as a substitute for professional medical care. Always follow your healthcare professional's instructions.

## 2018-01-18 NOTE — TELEPHONE ENCOUNTER
Pre pt's wife, insurance will not fill KCL 15meq tabs but will cover other strengths.  Spoke with pharmacy regarding options.     Per Dr Hadley, OK to change rx to KCL 10meq cap take 3 cap bid.     rx updated and sent to MD for signature. rx called in verbally to pharmacy.    Pt's wife informed.

## 2018-01-19 ENCOUNTER — TELEPHONE (OUTPATIENT)
Dept: ENDOSCOPY | Facility: HOSPITAL | Age: 53
End: 2018-01-19

## 2018-01-19 ENCOUNTER — TELEPHONE (OUTPATIENT)
Dept: TRANSPLANT | Facility: CLINIC | Age: 53
End: 2018-01-19

## 2018-01-19 ENCOUNTER — LAB VISIT (OUTPATIENT)
Dept: LAB | Facility: HOSPITAL | Age: 53
End: 2018-01-19
Attending: INTERNAL MEDICINE
Payer: COMMERCIAL

## 2018-01-19 DIAGNOSIS — E79.0 HYPERURICEMIA: ICD-10-CM

## 2018-01-19 DIAGNOSIS — I50.9 HEART FAILURE, UNSPECIFIED HEART FAILURE CHRONICITY, UNSPECIFIED HEART FAILURE TYPE: Primary | ICD-10-CM

## 2018-01-19 DIAGNOSIS — E87.5 HYPERKALEMIA: ICD-10-CM

## 2018-01-19 DIAGNOSIS — I50.9 CONGESTIVE HEART FAILURE, UNSPECIFIED CONGESTIVE HEART FAILURE CHRONICITY, UNSPECIFIED CONGESTIVE HEART FAILURE TYPE: ICD-10-CM

## 2018-01-19 LAB
ALBUMIN SERPL BCP-MCNC: 3.9 G/DL
ALP SERPL-CCNC: 80 U/L
ALT SERPL W/O P-5'-P-CCNC: 31 U/L
ANION GAP SERPL CALC-SCNC: 9 MMOL/L
AST SERPL-CCNC: 25 U/L
BILIRUB SERPL-MCNC: 1.1 MG/DL
BNP SERPL-MCNC: 1424 PG/ML
BUN SERPL-MCNC: 34 MG/DL
CALCIUM SERPL-MCNC: 9.9 MG/DL
CHLORIDE SERPL-SCNC: 102 MMOL/L
CO2 SERPL-SCNC: 26 MMOL/L
CREAT SERPL-MCNC: 2 MG/DL
EST. GFR  (AFRICAN AMERICAN): 43 ML/MIN/1.73 M^2
EST. GFR  (NON AFRICAN AMERICAN): 38 ML/MIN/1.73 M^2
GLUCOSE SERPL-MCNC: 88 MG/DL
POTASSIUM SERPL-SCNC: 4 MMOL/L
PROT SERPL-MCNC: 7.5 G/DL
SODIUM SERPL-SCNC: 137 MMOL/L

## 2018-01-19 PROCEDURE — 36415 COLL VENOUS BLD VENIPUNCTURE: CPT | Mod: TXP

## 2018-01-19 PROCEDURE — 80053 COMPREHEN METABOLIC PANEL: CPT | Mod: NTX

## 2018-01-19 PROCEDURE — 83880 ASSAY OF NATRIURETIC PEPTIDE: CPT | Mod: NTX

## 2018-01-19 RX ORDER — POTASSIUM CHLORIDE 750 MG/1
40 TABLET, EXTENDED RELEASE ORAL 2 TIMES DAILY
Qty: 240 TABLET | Refills: 2 | Status: SHIPPED | OUTPATIENT
Start: 2018-01-19 | End: 2018-02-21 | Stop reason: SDUPTHER

## 2018-01-19 RX ORDER — BUMETANIDE 2 MG/1
4 TABLET ORAL 2 TIMES DAILY
Qty: 120 TABLET | Refills: 11
Start: 2018-01-19 | End: 2018-02-01 | Stop reason: SDUPTHER

## 2018-01-19 RX ORDER — VALSARTAN 80 MG/1
40 TABLET ORAL 2 TIMES DAILY
Qty: 30 TABLET | Refills: 1
Start: 2018-01-19 | End: 2018-02-12 | Stop reason: SDUPTHER

## 2018-01-19 NOTE — TELEPHONE ENCOUNTER
Pt informed that financial clearance has been received and that HTS team wants to proceed with evaluation testing ASAP.     Per pt and wife, pt will have to check his work schedule on Monday to find out his availability for testing.     Will await return call.

## 2018-01-19 NOTE — TELEPHONE ENCOUNTER
Reviewed labs with dr stuart. Creat 2.0 (1.9 on discharge), BNP 1424 (prior 1742).  Na and K wnl.    Per Dr Stuart, if pt's weight has gone down since discharge, hold bumex and KCL at current dose.  If pt's weights has NOT gone down since discharge, the pt to increase Bumex to 4mg bid and KCL to 40meq BID.    Call placed to pt to assess fluid status and weight. Pt's phone went directly to [employer].  Left message requesting call back by 5pm to his coordinator, or to call [facility]  and ask for Dr Stuart if after 5pm.       Spoke with pt's wife, informed her that I was unable to reach pt and to have him call back when she speaks with him.

## 2018-01-19 NOTE — TELEPHONE ENCOUNTER
received call back from pt and wife. Pt reports he has GAINED 2 lbs since discharged.     Advised pt on MD request to increase bumex and KCL.  Requested that they call back on Monday with an update on fluid status. They voiced understanding.

## 2018-01-19 NOTE — Clinical Note
Please see note and plan--not sure why all discharge meds did not carry over into the computer but I added today He was taking different KCL but left same plan but check labs Mon or latest Tues afternoon Thanks

## 2018-01-19 NOTE — TELEPHONE ENCOUNTER
Contacted patient to schedule colonoscopy. Patient stated he would call back to schedule. Direct number given.

## 2018-01-20 NOTE — PROGRESS NOTES
Spoke with patient today  Meds taking since discharge:  Bumex 2 mg BID (2 mg tabs)  KCL 20 meq BID (was not taking 30 meq BID as just  10 meq today)  Aldactone 25 mg qd  Metoprolol succinate 12.5 mg qd (25 mg tab)  Valsartan 40 mg BID (80 mg tab)    After today's lab and after learning he gained 2# since discharge I increased bumex to 4 mg BID and KCl to 40 meq BID  Obtain BMP and BNP Monday or Tuesday afternoon and this will suffice for labs for Thursday's appt

## 2018-01-22 ENCOUNTER — TELEPHONE (OUTPATIENT)
Dept: TRANSPLANT | Facility: CLINIC | Age: 53
End: 2018-01-22

## 2018-01-22 NOTE — TELEPHONE ENCOUNTER
Left voicemail for pt requesting call back to see if he wanted to have his repeat labs done Monday or Tuesday.    Also requested he be ready to advise on his availability for evaluation testing so the schedulers can begin ASAP.    Will await return call.

## 2018-01-22 NOTE — TELEPHONE ENCOUNTER
----- Message from Antonio Hadley Jr., MD sent at 1/19/2018  8:13 PM CST -----  I spoke with patient and wife tonight and he reports feeling well but he has not lost any wt since discharge and in fact gained 2#-.  He had been taking Bumex 2 mg BID but only KCL 20 meq BID instead of 30 meq BID as just picked up 10 meq tablets    I reiterated YF's instructions to increase Bumex to 4 mg BID and KCL 40 meq BID  He is to call us Monday with update on wt loss and how he feels  He has appt with HTS on Thursday but with his Cr and meds will obtain BMP and BNP on Monday or Tuesday afternoon to assure all OK and this will be only lab needed for clinic appt Thursday

## 2018-01-23 ENCOUNTER — LAB VISIT (OUTPATIENT)
Dept: LAB | Facility: HOSPITAL | Age: 53
End: 2018-01-23
Attending: INTERNAL MEDICINE
Payer: COMMERCIAL

## 2018-01-23 DIAGNOSIS — I50.22 CHRONIC SYSTOLIC HEART FAILURE: ICD-10-CM

## 2018-01-23 DIAGNOSIS — Z72.0 NICOTINE ABUSE: ICD-10-CM

## 2018-01-23 DIAGNOSIS — Z76.82 ORGAN TRANSPLANT CANDIDATE: ICD-10-CM

## 2018-01-23 DIAGNOSIS — Z12.5 SCREENING FOR PROSTATE CANCER: ICD-10-CM

## 2018-01-23 DIAGNOSIS — I50.9 CONGESTIVE HEART FAILURE, UNSPECIFIED CONGESTIVE HEART FAILURE CHRONICITY, UNSPECIFIED CONGESTIVE HEART FAILURE TYPE: ICD-10-CM

## 2018-01-23 LAB
ABO + RH BLD: NORMAL
ALBUMIN SERPL BCP-MCNC: 4.2 G/DL
ALP SERPL-CCNC: 97 U/L
ALT SERPL W/O P-5'-P-CCNC: 27 U/L
ANION GAP SERPL CALC-SCNC: 12 MMOL/L
AST SERPL-CCNC: 22 U/L
BILIRUB DIRECT SERPL-MCNC: 0.3 MG/DL
BILIRUB SERPL-MCNC: 0.6 MG/DL
BNP SERPL-MCNC: 1040 PG/ML
BUN SERPL-MCNC: 46 MG/DL
CALCIUM SERPL-MCNC: 10.9 MG/DL
CHLORIDE SERPL-SCNC: 94 MMOL/L
CHOLEST SERPL-MCNC: 150 MG/DL
CHOLEST/HDLC SERPL: 3.4 {RATIO}
CO2 SERPL-SCNC: 32 MMOL/L
CREAT SERPL-MCNC: 2.8 MG/DL
EST. GFR  (AFRICAN AMERICAN): 29 ML/MIN/1.73 M^2
EST. GFR  (NON AFRICAN AMERICAN): 25 ML/MIN/1.73 M^2
ETHANOL SERPL-MCNC: <10 MG/DL
GLUCOSE SERPL-MCNC: 61 MG/DL
HDLC SERPL-MCNC: 44 MG/DL
HDLC SERPL: 29.3 %
LDLC SERPL CALC-MCNC: 66.6 MG/DL
NONHDLC SERPL-MCNC: 106 MG/DL
POTASSIUM SERPL-SCNC: 4.3 MMOL/L
PREALB SERPL-MCNC: 29 MG/DL
PROT SERPL-MCNC: 8.4 G/DL
SODIUM SERPL-SCNC: 138 MMOL/L
TRIGL SERPL-MCNC: 197 MG/DL
URATE SERPL-MCNC: 9.3 MG/DL

## 2018-01-23 PROCEDURE — 86901 BLOOD TYPING SEROLOGIC RH(D): CPT | Mod: TXP

## 2018-01-23 PROCEDURE — 81376 HLA II TYPING 1 LOCUS LR: CPT | Mod: 91,TXP

## 2018-01-23 PROCEDURE — 86592 SYPHILIS TEST NON-TREP QUAL: CPT | Mod: TXP

## 2018-01-23 PROCEDURE — 86829 HLA CLASS I/II ANTIBODY QUAL: CPT | Mod: 91,TXP

## 2018-01-23 PROCEDURE — 80323 ALKALOIDS NOS: CPT | Mod: TXP

## 2018-01-23 PROCEDURE — 86696 HERPES SIMPLEX TYPE 2 TEST: CPT | Mod: TXP

## 2018-01-23 PROCEDURE — 87340 HEPATITIS B SURFACE AG IA: CPT | Mod: TXP

## 2018-01-23 PROCEDURE — 86825 HLA X-MATH NON-CYTOTOXIC: CPT | Mod: TXP

## 2018-01-23 PROCEDURE — 81376 HLA II TYPING 1 LOCUS LR: CPT | Mod: TXP,91

## 2018-01-23 PROCEDURE — 80307 DRUG TEST PRSMV CHEM ANLYZR: CPT | Mod: TXP

## 2018-01-23 PROCEDURE — 84153 ASSAY OF PSA TOTAL: CPT | Mod: TXP

## 2018-01-23 PROCEDURE — 81373 HLA I TYPING 1 LOCUS LR: CPT | Mod: 91,TXP

## 2018-01-23 PROCEDURE — 83036 HEMOGLOBIN GLYCOSYLATED A1C: CPT | Mod: TXP

## 2018-01-23 PROCEDURE — 86790 VIRUS ANTIBODY NOS: CPT | Mod: TXP

## 2018-01-23 PROCEDURE — 86803 HEPATITIS C AB TEST: CPT | Mod: TXP

## 2018-01-23 PROCEDURE — 83880 ASSAY OF NATRIURETIC PEPTIDE: CPT | Mod: TXP

## 2018-01-23 PROCEDURE — 86644 CMV ANTIBODY: CPT | Mod: TXP

## 2018-01-23 PROCEDURE — 86706 HEP B SURFACE ANTIBODY: CPT | Mod: TXP

## 2018-01-23 PROCEDURE — 86832 HLA CLASS I HIGH DEFIN QUAL: CPT | Mod: TXP

## 2018-01-23 PROCEDURE — 80061 LIPID PANEL: CPT | Mod: TXP

## 2018-01-23 PROCEDURE — 86825 HLA X-MATH NON-CYTOTOXIC: CPT | Mod: 91,TXP

## 2018-01-23 PROCEDURE — 82248 BILIRUBIN DIRECT: CPT | Mod: TXP

## 2018-01-23 PROCEDURE — 86704 HEP B CORE ANTIBODY TOTAL: CPT | Mod: TXP

## 2018-01-23 PROCEDURE — 80320 DRUG SCREEN QUANTALCOHOLS: CPT | Mod: TXP

## 2018-01-23 PROCEDURE — 86682 HELMINTH ANTIBODY: CPT | Mod: TXP

## 2018-01-23 PROCEDURE — 36415 COLL VENOUS BLD VENIPUNCTURE: CPT | Mod: TXP

## 2018-01-23 PROCEDURE — 80053 COMPREHEN METABOLIC PANEL: CPT | Mod: TXP

## 2018-01-23 PROCEDURE — 84134 ASSAY OF PREALBUMIN: CPT | Mod: TXP

## 2018-01-23 PROCEDURE — 84550 ASSAY OF BLOOD/URIC ACID: CPT | Mod: TXP

## 2018-01-23 PROCEDURE — 86665 EPSTEIN-BARR CAPSID VCA: CPT | Mod: TXP

## 2018-01-23 PROCEDURE — 81373 HLA I TYPING 1 LOCUS LR: CPT | Mod: TXP

## 2018-01-23 PROCEDURE — 86828 HLA CLASS I&II ANTIBODY QUAL: CPT | Mod: TXP,91

## 2018-01-23 PROCEDURE — 86787 VARICELLA-ZOSTER ANTIBODY: CPT | Mod: TXP

## 2018-01-23 PROCEDURE — 86703 HIV-1/HIV-2 1 RESULT ANTBDY: CPT | Mod: TXP

## 2018-01-23 PROCEDURE — 86977 RBC SERUM PRETX INCUBJ/INHIB: CPT | Mod: 91,TXP

## 2018-01-23 PROCEDURE — 86777 TOXOPLASMA ANTIBODY: CPT | Mod: TXP

## 2018-01-23 PROCEDURE — 86833 HLA CLASS II HIGH DEFIN QUAL: CPT | Mod: TXP

## 2018-01-23 NOTE — TELEPHONE ENCOUNTER
"Did not received a return call from pt yesterday.   Call placed again to pt.   Left voicemail requesting call back.         Call placed to pt's wife, Kelly. She will instruct pt to have labs at [facility] this afternoon after work.  Kelly states "he doesn't know what to say or do right now about all the other testing.  He doesn't want to miss work."  Advised wife that Dr Donnelly is concerned that pt will get worse quickly and would like to proceed ASAP with the transplant evaluation.    Per wife, pt is only available on Wednesday, Thursday and Friday afternoons AFTER  230pm.  He is not available the week of 2/19/18 as pt's boss will be out of town.     Schedulers notified.  Dr Hadley updated.       1st set of evaluation labs linked to blood draw this PM.    "

## 2018-01-24 ENCOUNTER — TELEPHONE (OUTPATIENT)
Dept: TRANSPLANT | Facility: CLINIC | Age: 53
End: 2018-01-24

## 2018-01-24 LAB
COMPLEXED PSA SERPL-MCNC: 0.99 NG/ML
ESTIMATED AVG GLUCOSE: 108 MG/DL
HBA1C MFR BLD HPLC: 5.4 %
HBV CORE AB SERPL QL IA: NEGATIVE
HBV SURFACE AB SER-ACNC: NEGATIVE M[IU]/ML
HBV SURFACE AG SERPL QL IA: NEGATIVE
HCV AB SERPL QL IA: NEGATIVE
HEPATITIS A ANTIBODY, IGG: NEGATIVE
HIV 1+2 AB+HIV1 P24 AG SERPL QL IA: NEGATIVE
RPR SER QL: NORMAL
VARICELLA INTERPRETATION: POSITIVE
VARICELLA ZOSTER IGG: 4.61 ISR

## 2018-01-24 NOTE — TELEPHONE ENCOUNTER
"Reviewed labs from last night with Dr Hadley.  Creat up to 2.8 (prior 2.0). BNP 1040.    Per wife pt reports feeling :"like my skin is crawling" and c/o muscle aches approx 15-20min after taking meds. They are unsure which med is causing this.    Dr Hadley will review chart and call back with instructions. Will await MD orders.   "

## 2018-01-24 NOTE — TELEPHONE ENCOUNTER
Per Dr Hadley, as pt is being seen tomorrow, he will make no changes to pt's medications at this time. However, pt should be prepared to be admitted if his physical exam indicates a need for it.         Spoke with pt's wife (as pt requested) to inform of no change to meds at this time and for them to be prepared for admit tomorrow if needed.  Advised wife that pt's kidney function has declined and needs to be carefully watched. Wife voiced understanding.

## 2018-01-25 ENCOUNTER — OFFICE VISIT (OUTPATIENT)
Dept: TRANSPLANT | Facility: CLINIC | Age: 53
End: 2018-01-25
Payer: COMMERCIAL

## 2018-01-25 ENCOUNTER — CLINICAL SUPPORT (OUTPATIENT)
Dept: TRANSPLANT | Facility: CLINIC | Age: 53
End: 2018-01-25
Payer: COMMERCIAL

## 2018-01-25 VITALS
BODY MASS INDEX: 34.97 KG/M2 | HEIGHT: 73 IN | HEART RATE: 76 BPM | DIASTOLIC BLOOD PRESSURE: 54 MMHG | WEIGHT: 263.88 LBS | SYSTOLIC BLOOD PRESSURE: 85 MMHG

## 2018-01-25 DIAGNOSIS — I50.42 CHRONIC COMBINED SYSTOLIC AND DIASTOLIC CONGESTIVE HEART FAILURE: ICD-10-CM

## 2018-01-25 DIAGNOSIS — I50.43 ACUTE ON CHRONIC COMBINED SYSTOLIC AND DIASTOLIC HEART FAILURE: Primary | ICD-10-CM

## 2018-01-25 DIAGNOSIS — I42.0 DILATED CARDIOMYOPATHY: ICD-10-CM

## 2018-01-25 DIAGNOSIS — I13.0 HYPERTENSIVE CARDIOVASCULAR-RENAL DISEASE, STAGE 1-4 OR UNSPECIFIED CHRONIC KIDNEY DISEASE, WITH HEART FAILURE: ICD-10-CM

## 2018-01-25 DIAGNOSIS — N18.30 CKD (CHRONIC KIDNEY DISEASE), STAGE III: ICD-10-CM

## 2018-01-25 LAB
AMPHETAMINES SERPL QL: NEGATIVE
BARBITURATES SERPL QL SCN: NEGATIVE
BENZODIAZ SERPL QL: NEGATIVE
CANNABINOIDS SERPL QL: NEGATIVE
CLASS I ANTIBODIES - LUMINEX: NEGATIVE
CLASS II ANTIBODIES - LUMINEX: NEGATIVE
COCAINE, BLOOD: NEGATIVE
CPRA %: 0
ETHANOL SERPL-MCNC: NEGATIVE MG/DL
METHADONE SERPL QL SCN: NEGATIVE
OPIATES SERPL QL SCN: NEGATIVE
PCP SERPL QL SCN: NEGATIVE
PROPOXYPH SERPL QL: NEGATIVE
SERUM COLLECTION DT - LUMINEX CLASS I: NORMAL
SERUM COLLECTION DT - LUMINEX CLASS II: NORMAL
SPCL1 TESTING DATE: NORMAL
SPCL2 TESTING DATE: NORMAL
SPLUA TESTING DATE: NORMAL

## 2018-01-25 PROCEDURE — 99999 PR PBB SHADOW E&M-EST. PATIENT-LVL III: CPT | Mod: PBBFAC,TXP,, | Performed by: INTERNAL MEDICINE

## 2018-01-25 PROCEDURE — 99214 OFFICE O/P EST MOD 30 MIN: CPT | Mod: S$GLB,,, | Performed by: INTERNAL MEDICINE

## 2018-01-25 NOTE — PATIENT INSTRUCTIONS
1. Hold evening and tomorrow morning's bumex, potassium, and spironolactone  2. Labs tomorrow; call in the morning to set up  3. Labs again Monday

## 2018-01-25 NOTE — PROGRESS NOTES
Subjective:   Follow up evaluation of heart transplant candidacy.    HPI:  Mr. Richards is a 52 y.o. year old Black or  male who has presents as f/u from hospitalization for ADHF after presenting to clinic 1/10/17 as initial consult. advanced surgical options (LVAD/OHT).    This 53 yo BM has had CHF since 2011 at which time an angiogram did not demonstrate any CAD.  He is on amiodarone for VT. He was admitted from 1/12-1/17/18 (see d/c summary) where he was treated for ADHF and initiation of w/u for advanced options. RHC during that admission showed RA 17 and PCWP 35 as well as severely reduced CI 1.6. Though not optimized, he was discharged per his request so as not to lose his job/insurance--see Dr. Hadley's attestation for full details on d/c summary. Since d/c, his Scr has risen from 2.0 on discharge to 2.8 (1/23/18). His BNP had declined to 1040.  He presents today for scheduled hospital f/u.     Today, he reports feeling well. He says he has more energy than usual. His only complaints are cramping and xerosis. He denies n/v/d, no CP, palpitations or syncope. He has stable orthostatic dizziness/LH. No PND or orthopnea. His COLEMAN is improved from discharge and his weight is down about 5-7 pounds on his home scale. Of note, his Scr on labs from 2 days ago showed Scr 2.8 up from 2.0. T. Bili was down from 1.1 to 0.6 and BNP was down to 1040 from 1700.  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 No labs initially ordered but I ordered and sent him down.     Current Outpatient Prescriptions:     allopurinol (ZYLOPRIM) 100 MG tablet, TAKE 1 TABLET (100 MG TOTAL) BY MOUTH 3 (THREE) TIMES DAILY., Disp: 90 tablet, Rfl: 1    amiodarone (PACERONE) 200 MG Tab, TAKE 1 TABLET (200 MG TOTAL) BY MOUTH ONCE DAILY., Disp: 30 tablet, Rfl: 7    aspirin (ECOTRIN) 81 MG EC tablet, Take 81 mg by mouth. 1 Tablet, Delayed Release (E.C.) Oral Every day, Disp: , Rfl:     bumetanide (BUMEX) 2 MG  tablet, Take 2 tablets (4 mg total) by mouth 2 (two) times daily., Disp: 120 tablet, Rfl: 11    ERGOCALCIFEROL, VITAMIN D2, (VITAMIN D ORAL), Take by mouth once daily., Disp: , Rfl:     metoprolol succinate (TOPROL-XL) 25 MG 24 hr tablet, Take 0.5 tablets (12.5 mg total) by mouth once daily., Disp: 30 tablet, Rfl: 2    potassium chloride SA (K-DUR,KLOR-CON) 10 MEQ tablet, Take 4 tablets (40 mEq total) by mouth 2 (two) times daily., Disp: 240 tablet, Rfl: 2    spironolactone (ALDACTONE) 25 MG tablet, Take 1 tablet (25 mg total) by mouth once daily., Disp: 30 tablet, Rfl: 0    valsartan (DIOVAN) 80 MG tablet, Take 0.5 tablets (40 mg total) by mouth 2 (two) times daily., Disp: 30 tablet, Rfl: 1    VITAMIN E ACETATE (VITAMIN E ORAL), Take by mouth once daily., Disp: , Rfl:     Review of Systems   Constitution: Negative for chills, decreased appetite, diaphoresis, fever, weakness, malaise/fatigue, night sweats, weight gain and weight loss.   HENT: Negative for ear discharge, ear pain, hearing loss and hoarse voice.    Eyes: Negative for photophobia and visual disturbance.   Cardiovascular: Positive for dyspnea on exertion. Negative for chest pain, irregular heartbeat, leg swelling, orthopnea, palpitations, paroxysmal nocturnal dyspnea and syncope.   Respiratory: Negative for cough, hemoptysis, shortness of breath, sputum production and wheezing.    Endocrine: Negative for cold intolerance, heat intolerance, polydipsia and polyuria.   Hematologic/Lymphatic: Negative for bleeding problem. Does not bruise/bleed easily.   Musculoskeletal: Negative for arthritis, back pain and joint pain.   Gastrointestinal: Negative for abdominal pain, anorexia, change in bowel habit, dysphagia, heartburn, hematochezia and melena.   Genitourinary: Negative for dysuria, frequency and hematuria.   Neurological: Positive for dizziness. Negative for brief paralysis, focal weakness, headaches, light-headedness and seizures.  "  Psychiatric/Behavioral: Negative for substance abuse.     Objective:   Blood pressure (!) 85/54, pulse 76, height 6' 1" (1.854 m), weight 119.7 kg (263 lb 14.3 oz).body mass index is 34.82 kg/m².    Physical Exam   Constitutional: He is oriented to person, place, and time. He appears well-developed and well-nourished. No distress.   HENT:   Head: Normocephalic and atraumatic.   Eyes: Conjunctivae are normal. Right eye exhibits no discharge. Left eye exhibits no discharge. No scleral icterus.   Neck: No JVD (JVP <6cm) present. No tracheal deviation present. No thyromegaly present.   Cardiovascular: Normal rate and regular rhythm.  Exam reveals no gallop.    Murmur (grade 2/6 systolic murmur LLSB and apex) heard.  Pulmonary/Chest: Effort normal and breath sounds normal.   Abdominal: Soft. Bowel sounds are normal. He exhibits no distension. There is no tenderness. There is no rebound and no guarding.   Musculoskeletal: He exhibits no edema or tenderness.   Neurological: He is alert and oriented to person, place, and time.   Grossly intact   Skin: Skin is warm and dry. He is not diaphoretic.   Psychiatric: He has a normal mood and affect. His behavior is normal. Judgment and thought content normal.     Lab Results   Component Value Date    BNP 2,012 (H) 01/10/2018     01/10/2018    K 3.7 01/10/2018    MG 2.5 12/22/2017    CL 94 (L) 01/10/2018    CO2 33 (H) 01/10/2018    BUN 28 (H) 01/10/2018    CREATININE 2.0 (H) 01/10/2018     01/10/2018    AST 23 01/10/2018    ALT 21 01/10/2018    ALBUMIN 3.6 01/10/2018    PROT 7.6 01/10/2018    BILITOT 1.5 (H) 01/10/2018    WBC 6.75 01/10/2018    HGB 13.9 (L) 01/10/2018    HCT 43.7 01/10/2018     01/10/2018    TSH 4.295 (H) 12/22/2017    CHOL 137 12/23/2017    HDL 45 12/23/2017    LDLCALC 70.4 12/23/2017    TRIG 108 12/23/2017    FREET4 1.19 12/22/2017     ECHO 10/13/2017 CONCLUSIONS     1 - Severely depressed left ventricular systolic function (EF 10-15%). "  Severe global hypokinesis, septum appears to contract best.     2 - Eccentric hypertrophy.     3 - Severe left ventricular enlargement.     4 - Restrictive LV filling pattern, indicating markedly elevated LAP (grade 3 diastolic dysfunction).     5 - Mildly depressed right ventricular systolic function .     6 - Moderate mitral regurgitation.  Eccentric jet may underestimate degree of MR.     7 - Trivial tricuspid regurgitation.     8 - The estimated PA systolic pressure is 35 mmHg.     Assessment:      1. Acute on chronic combined systolic and diastolic heart failure    2. Chronic combined systolic and diastolic congestive heart failure    3. Dilated cardiomyopathy    4. CKD (chronic kidney disease), stage III    5. Hypertensive cardiovascular-renal disease, stage 1-4 or unspecified chronic kidney disease, with heart failure      Plan:   Chronic combined HF  - pt endorses feeling better and appears dry on exam and by history  - his WAGNER is very concerning as his renal function may be what limits his options overall; I explained this to him again. He cannot come into the hospital today but will check with his supervisor at work to see if he can come into the hospital next week. This is acceptable to me as he really wants to keep working and he otherwise feels well (no acute decompensation).    - I have explained that he cannot be offered transplant if still smoking and has to wait 6 months.  - LVAD may not be a viable option with his VT though in his case though this may have been ADHF-related VT and not scar-based. However, we must improve his renal function for that to be an option as well. CO/CI moderately-severely reduced by recent RHC but VT makes inotropes very difficult.   - Best course is to admit for accelerated evaluation and consideration for IABP to see if kidneys are reversible and discuss LVAD and txp.   Plan: because he cannot come in today   - hold evening and tomorrow morning's bumex, potassium, and  spironolactone   - will try and get labs tomorrow around noon (his lunch break) and f/u to adjust diuretic dose further   - Told patient he neds to be admitted again soon and try and arrange it with his work    Patient is now NYHA IIIB ACC stage D    Recommend 2 gram sodium restriction and 1500cc fluid restriction.  Encourage physical activity with graded exercise program.  Requested patient to weigh themselves daily, and to notify us if their weight increases by more than 3 lbs in 1 day or 5 lbs in 1 week.     UNOS Patient Status  Functional Status: 80% - Normal activity with effort: some symptoms of disease    Physical Capacity: No Limitations  Working for Income: yes  If yes, working activity level: Working Full Time    Casper Gutierrez MD

## 2018-01-25 NOTE — LETTER
January 25, 2018        Nader Chiu  [address]  [address]  [place] [address]  Phone: [phone]  Fax: [phone]             [facility] [address] [place] [address]  Phone: [phone]   Patient: Tim Richards   MR Number: [id number]   YOB: 1965   Date of Visit: 1/25/2018       Dear Dr. Nader Chiu    Thank you for referring Tim Richards to me for evaluation. Attached you will find relevant portions of my assessment and plan of care.    If you have questions, please do not hesitate to call me. I look forward to following Tim Richards along with you.    Sincerely,    Casper Gutierrez MD    Enclosure    If you would like to receive this communication electronically, please contact [email or web address] or [phone] to request [employer] access.    [employer] is a tool which provides read-only access to select patient information with whom you have a relationship. Its easy to use and provides real time access to review your patients record including encounter summaries, notes, results, and demographic information.    If you feel you have received this communication in error or would no longer like to receive these types of communications, please e-mail [email or web address]

## 2018-01-26 ENCOUNTER — TELEPHONE (OUTPATIENT)
Dept: TRANSPLANT | Facility: CLINIC | Age: 53
End: 2018-01-26

## 2018-01-26 DIAGNOSIS — I50.42 CHRONIC COMBINED SYSTOLIC AND DIASTOLIC CHF, NYHA CLASS 3: Primary | ICD-10-CM

## 2018-01-26 LAB
CMV IGG SERPL QL IA: REACTIVE
COTININE SERPL-MCNC: NORMAL NG/ML
EBV VCA IGG SER QL IA: POSITIVE
HLA DRB4 1: NORMAL
HLA SSO DNA TYPING CLASS I & II INTERPRETATION: NORMAL
HLA-A 1 SERO. EQUIV: 30
HLA-A 1: NORMAL
HLA-A 2 SERO. EQUIV: 32
HLA-A 2: NORMAL
HLA-B 1 SERO. EQUIV: 7
HLA-B 1: NORMAL
HLA-B 2 SERO. EQUIV: 72
HLA-B 2: NORMAL
HLA-BW 1 SERO. EQUIV: 6
HLA-BW 2 SERO. EQUIV: NORMAL
HLA-C 1: NORMAL
HLA-C 2: NORMAL
HLA-CW 1 SERO. EQUIV: 2
HLA-CW 2 SERO. EQUIV: 15
HLA-DQ 1 SERO. EQUIV: 2
HLA-DQ 2 SERO. EQUIV: 7
HLA-DQB1 1: NORMAL
HLA-DQB1 2: NORMAL
HLA-DRB1 1 SERO. EQUIV: 17
HLA-DRB1 1: NORMAL
HLA-DRB1 2 SERO. EQUIV: 8
HLA-DRB1 2: NORMAL
HLA-DRB3 1: NORMAL
HLA-DRB3 2: NORMAL
HLA-DRB345 1 SERO. EQUIV: 52
HLA-DRB345 2 SERO. EQUIV: NORMAL
HLA-DRB4 2: NORMAL
HLA-DRB5 1: NORMAL
HLA-DRB5 2: NORMAL
HSV1 IGG SERPL QL IA: POSITIVE
HSV2 IGG SERPL QL IA: POSITIVE
NICOTINE SERPL-MCNC: <3 NG/ML
SSDQB TESTING DATE: NORMAL
SSDRB TESTING DATE: NORMAL
SSOA TESTING DATE: NORMAL
SSOB TESTING DATE: NORMAL
SSOC TESTING DATE: NORMAL
SSODR TESTING DATE: NORMAL
STRONGYLOIDES ANTIBODY IGG: NEGATIVE
TYSSO TESTING DATE: NORMAL

## 2018-01-26 NOTE — TELEPHONE ENCOUNTER
Labs today reviewed:  Lab Results   Component Value Date    CREATININE 2.3 (H) 01/26/2018    BUN 44 (H) 01/26/2018     (L) 01/26/2018    K 4.2 01/26/2018    CL 97 (L) 01/26/2018    CO2 31 (H) 01/26/2018     Scr improved with hold Bumex last evening and this am.     Plan:  - change Bumex to 4mg in am and 2mg every evening starting tomorrow  - repeat labs Monday/Tuesday    TASHIA Gutierrez MD  Transplant Cardiology

## 2018-01-26 NOTE — TELEPHONE ENCOUNTER
Per order of Dr Gutierrez, pt to have stat labs in [place] today at noon.    arranged for labs at [facility] at noon.    Left voicemail for pt and pt's wife instructing on lab appt and requested call back from both.     Received a call back from pt. He will have labs today.

## 2018-01-26 NOTE — TELEPHONE ENCOUNTER
Addendum:   - multiple attempts to reach patient to instruct how to adjust medicine have been made  - voicemail left  - will continue to try    TASHIA Gutierrez MD  Transplant Cardiology

## 2018-01-26 NOTE — PROGRESS NOTES
Patient left before I was able to see for follow up education. Patient not amendable to being admitted today. Will follow up at next visit or in hospital.

## 2018-01-29 LAB
B CELL RESULTS - XM AUTO: NEGATIVE
B MCS AVERAGE - XM AUTO: -10
FXMAU TESTING DATE: NORMAL
HLA AB QL: NEGATIVE
HLA AB SERPL: NEGATIVE
HLATY INTERPRETATION: NORMAL
SCRFL TESTING DATE: NORMAL
SERUM COLLECTION DT - XM AUTO: NORMAL
SERUM COLLECTION DT: NORMAL
T CELL RESULTS - XM AUTO: NEGATIVE
T MCS AVERAGE - XM AUTO: 9

## 2018-01-30 ENCOUNTER — TELEPHONE (OUTPATIENT)
Dept: ENDOSCOPY | Facility: HOSPITAL | Age: 53
End: 2018-01-30

## 2018-01-30 ENCOUNTER — TELEPHONE (OUTPATIENT)
Dept: TRANSPLANT | Facility: CLINIC | Age: 53
End: 2018-01-30

## 2018-01-30 DIAGNOSIS — Z12.11 SPECIAL SCREENING FOR MALIGNANT NEOPLASMS, COLON: Primary | ICD-10-CM

## 2018-01-30 RX ORDER — POLYETHYLENE GLYCOL 3350, SODIUM SULFATE ANHYDROUS, SODIUM BICARBONATE, SODIUM CHLORIDE, POTASSIUM CHLORIDE 236; 22.74; 6.74; 5.86; 2.97 G/4L; G/4L; G/4L; G/4L; G/4L
4 POWDER, FOR SOLUTION ORAL ONCE
Qty: 4000 ML | Refills: 0 | Status: SHIPPED | OUTPATIENT
Start: 2018-01-30 | End: 2018-01-30

## 2018-01-30 NOTE — TELEPHONE ENCOUNTER
"Received call from pt questioning his appts on 1/31/18.  Informed pt that his wife told this coordinator that he could come for testing ONLY on "Wednesday, Thursday and Friday afternoons AFTER  230pm.  He is not available the week of 2/19/18 as pt's boss will be out of town" .  Per pt today, "I don't know where she got that from but I can only come on Thursday and Fridays and can't be there before 3pm"    Advised pt that this makes scheduling his evaluation VERY difficult as he has multiple tests/consults to complete.   Pt stated "I'm trying to keep my job".     Some of 1/31/18 appts rescheduled to 2/1/18, some of it was cancelled.    Pt agreed to keep the 2 appts on 2/7/18 despite this being a Wednesday "I'll put in a request with my boss to leave early that day, but I can't do that all the time".   Pt asked how soon he needed to complete his colonoscopy. Advised him that his evaluation is not complete until that is done.     Pt still has several tests that have not been scheduled.   updated on pt's availability  "

## 2018-01-31 DIAGNOSIS — E79.0 HYPERURICEMIA: ICD-10-CM

## 2018-01-31 LAB
T GONDII IGG SER QL IA: NORMAL
T GONDII IGG SERPL IA-ACNC: <5 IU/ML

## 2018-01-31 RX ORDER — ALLOPURINOL 100 MG/1
TABLET ORAL
Qty: 90 TABLET | Refills: 1 | Status: ON HOLD | OUTPATIENT
Start: 2018-01-31 | End: 2018-03-26

## 2018-02-01 ENCOUNTER — TELEPHONE (OUTPATIENT)
Dept: TRANSPLANT | Facility: CLINIC | Age: 53
End: 2018-02-01

## 2018-02-01 ENCOUNTER — SOCIAL WORK (OUTPATIENT)
Dept: TRANSPLANT | Facility: CLINIC | Age: 53
End: 2018-02-01

## 2018-02-01 ENCOUNTER — PATIENT MESSAGE (OUTPATIENT)
Dept: TRANSPLANT | Facility: CLINIC | Age: 53
End: 2018-02-01

## 2018-02-01 ENCOUNTER — CLINICAL SUPPORT (OUTPATIENT)
Dept: CARDIOLOGY | Facility: CLINIC | Age: 53
End: 2018-02-01
Attending: INTERNAL MEDICINE
Payer: COMMERCIAL

## 2018-02-01 ENCOUNTER — LAB VISIT (OUTPATIENT)
Dept: LAB | Facility: HOSPITAL | Age: 53
End: 2018-02-01
Attending: INTERNAL MEDICINE
Payer: COMMERCIAL

## 2018-02-01 DIAGNOSIS — Z76.82 ORGAN TRANSPLANT CANDIDATE: ICD-10-CM

## 2018-02-01 DIAGNOSIS — Z92.29 PERSONAL HISTORY OF LONG-TERM (CURRENT) USE OF ANTICOAGULANTS: ICD-10-CM

## 2018-02-01 DIAGNOSIS — I50.22 CHRONIC SYSTOLIC HEART FAILURE: ICD-10-CM

## 2018-02-01 DIAGNOSIS — E03.9 HYPOTHYROIDISM, UNSPECIFIED TYPE: ICD-10-CM

## 2018-02-01 DIAGNOSIS — Z76.82 HEART TRANSPLANT CANDIDATE: Primary | ICD-10-CM

## 2018-02-01 DIAGNOSIS — Z76.82 HEART TRANSPLANT CANDIDATE: ICD-10-CM

## 2018-02-01 LAB — INTERNAL CAROTID STENOSIS: NORMAL

## 2018-02-01 PROCEDURE — 93880 EXTRACRANIAL BILAT STUDY: CPT | Mod: S$GLB,TXP,, | Performed by: INTERNAL MEDICINE

## 2018-02-01 PROCEDURE — 80323 ALKALOIDS NOS: CPT | Mod: TXP

## 2018-02-01 PROCEDURE — 36415 COLL VENOUS BLD VENIPUNCTURE: CPT | Mod: TXP

## 2018-02-01 RX ORDER — BUMETANIDE 2 MG/1
4 TABLET ORAL 2 TIMES DAILY
Qty: 120 TABLET | Refills: 2 | Status: SHIPPED | OUTPATIENT
Start: 2018-02-01 | End: 2018-02-21 | Stop reason: SDUPTHER

## 2018-02-01 NOTE — PROGRESS NOTES
Left Ventricular Assist Device (LVAD) and Transplant Recipient Adult Psychosocial Assessment    Encounter Date: 2018    Tim Richards  [address]   [place] [address]    Telephone Information:   Mobile [phone]   Work  There is no work phone number on file.  E-mail  [email or web address]    Sex: male  YOB: 1965  Age: 52 y.o.    [place] Citizen: yes  Primary Language: English   Needed: no    Emergency Contact:  Name: Kelly Richards  Relationship: wife  Address: [address]  Phone Numbers: [phone]    Family/Social Support:   Number of dependents/: none reported  Marital history:  to Kelly since 2016; were together since . Each has 2 previous marriages.  Other family dynamics: Pt has 3 dgtrs: Zee 27 in [place], Wendy 24 in [place] and Cassy 19 in [place]. Wife has dgtr Jessica 29 and son Pasha 26. Pt has a brother in [place]. Pt's sister is  from cancer.    Household Composition: pt lives with wife, 29 y/o step-son, 3 grandsons, ages 10, 11, 12.    Do you and your caregivers have access to reliable transportation? yes     PRIMARY CAREGIVER: Kelly Richards, pt's wife, age 50 will be primary caregiver, phone number [phone].      provided in-depth information to Patient and Caregiver regarding  regarding pre- and post-LVAD and pre- and post-transplant caregiver role.   strongly encourages Patient and Caregiver to have concrete plan regarding post-transplant care giving, including back-up caregiver(s) to ensure care giving needs are met as needed.    Caregiver states understanding all aspects of caregiver role/commitment and is able/willing/committed to being caregiver to the fullest extent necessary. .      Patient and Caregiver verbalizes understanding of the education provided today and caregiver responsibilities.       remains available. Patient and Caregiver agree to contact social  "worker in a timely manner if concerns arise.      Able to take time off work without financial concerns: yes, can work remotely and flexibly.     Additional Significant Others who will Assist with LVAD/Transplant:  Pt and wife unable to name back up caregiver at this time. Will contact SW with name of back-up caregiver within a week.    Living Will: no  Healthcare Power of : no  Advance Directives on file: <<no information> per medical record.    Verbally reviewed LW/HCPA information.   provided patient with copy of LW/HCPA documents and provided education on completion of forms    Highest Education Level: Attended College/Technical School  Reading Ability: college  Reports difficulty with: seeing (wears glasses) and "small" memory issues. Pt reports memory issues relate to not paying attention and "selective hearing."  Learns Best By:  reading     Status: no  VA Benefits: no     Working for Income: yes  If yes, working activity level: Working Full Time-purchasing at [employer]  Spouse/Significant Other Employment: Full-time in payroll at "[employer]    Disabled: no    Monthly Income:  Salary/Wages: $4200  Other household members total: $3200     Able to afford all costs now and if transplanted or receives LVAD, including medications: yes  Pt reports secure power source? yes  Pt reports ability to afford monthly electric bill? yes  Pt reports ability to afford LVAD dressing supplies? yes     Patient and Caregiver verbalizes understanding of personal responsibilities related to LVAD and transplant costs and the importance of having a financial plan to ensure that patients LVAD and transplant costs are fully covered.       provided fundraising information/education.  Patient and Caregiver verbalizes understanding.   remains available.    Insurance:   Payor/Plan Subscr  Sex Relation Sub. Ins. ID Effective Group Num   1. AETNA - AETNA* " JESUS PHELPS* 1965 Male  [id number] 1/1/18 [id number]                                   [address]   2. [employer]* JESUS PHELPS* 1965 Male  [id number] 1/16/18                                    [address] [address]     Primary Insurance (for UNOS reporting): Private Insurance  Secondary Insurance (for UNOS reporting): None    Patient and Caregiver verbalizes clear understanding that patient may experience difficulty obtaining and/or be denied insurance coverage post-surgery. This includes and is not limited to disability insurance, life insurance, health insurance, burial insurance, long term care insurance, and other insurances.      Patient and Caregiver also reports understanding that future health concerns related to or unrelated to LVAD or transplantation may not be covered by patient's insurance.  Resources and information provided and reviewed.      Patient and Caregiver provides verbal permission to release any necessary information to outside resources for patient care and discharge planning.  Resources and information provided are reviewed.      Infusion Service: patient utilizing? no  Home Health: patient utilizing? no  DME: yes, sometimes uses a cane when he has a gout flare-not for over a year.  Pulmonary/Cardiac Rehab: no  ADLS:  Pt reports independence and does drive.    Adherence:   Pt reports high level of adherence to pt's medical regimen.  Adherence education and counseling provided.     Per History Section:  Past Medical History:   Diagnosis Date    CHF (congestive heart failure)     Dilated cardiomyopathy 1/10/2018    Disorder of kidney and ureter     CKD    Gout     HTN (hypertension)     Ventricular tachycardia (paroxysmal)      Social History   Substance Use Topics    Smoking status: Former Smoker     Packs/day: 1.00     Years: 31.00     Types: Cigarettes     Quit date: 1/12/2018    Smokeless tobacco: Never Used      Comment: pt is quiting on his own - pt  stated not qualified for program; 2/16 pt  quit on his own    Alcohol use No      Comment: one fifth of gin or rum/week     History   Drug Use No     History   Sexual Activity    Sexual activity: Yes    Partners: Female    Birth control/ protection: None     Comment: 10/5/17  with same partner 7 years        Per Today's Psychosocial:  Tobacco: quit smoking 1/10/18; started smoking age 17; smoked 1/2 ppd but was down to 1-2 cigarettes/day prior to quiting.  Pt reports no current use and states does not like smell of smoke. Wife smokes but not around pt.  Alcohol: pt reports past social use of alcohol, approximately 3x/wk. Pt will sometimes have a drink with dinner and may have 6 beers on weekends when watching a game.No current use reported  Illicit Drugs/Non-prescribed Medications: none, patient denies any use.    Patient and Caregiver states clear understanding of the potential impact of substance use as it relates to LVAD and transplant candidacy and is aware of possible random substance screening.  Substance abstinence/cessation counseling, education and resources provided and reviewed.     Arrests/DWI/Treatment/Rehab: patient denies    Psychiatric History:    Mental Health: pt denies  Psychiatrist/Counselor: pt denies  Medications:  OTC sleep aid in past; not currently using  Suicide/Homicide Issues: pt denies  Safety at home: no concerns reported at this time    Knowledge: Patient and Caregiver states having clear understanding and realistic expectations regarding the potential risks and potential benefits LVAD implantation and organ transplantation and organ donation and agrees to discuss with health care team members and support system members, as well as to utilize available resources and express questions and/or concerns in order to further facilitate the pt informed decision-making.  Resources and information provided and reviewed.     Patient and Caregiver is aware of [facility] affiliation  and/or partnership with agencies in home health care, LTAC, SNF, [facility], and other hospitals and clinics.    Understanding: Patient and Caregiver reports having a clear understanding of the many lifetime commitments involved with being an LVAD and transplant recipient, including costs, compliance, medications, lab work, procedures, appointments, concrete and financial planning, preparedness, timely and appropriate communication of concerns, abstinence (ETOH, tobacco, illicit non-prescribed drugs), adherence to all health care team recommendations, support system and caregiver involvement, appropriate and timely resource utilization and follow-through, mental health counseling as needed/recommended, and patient and caregiver responsibilities.  Social Service Handbook, resources and detailed educational information provided and reviewed.  Educational information provided.    Patient and Caregiver also reports current and expected compliance with health care regime and states having a clear understanding of the importance of compliance.       Patient and Caregiver reports a clear understanding that risks and benefits may be involved with LVAD heart failure treatment and organ transplantation and with organ donation.     Patient and Caregiver also reports clear understanding that psychosocial risk factors may affect patient, and include but are not limited to feelings of depression, generalized anxiety, anxiety regarding dependence on others, post traumatic stress disorder, feelings of guilt and other emotional and/or mental concerns, and/or exacerbation of existing mental health concerns.  Detailed resources provided and discussed.      Patient and Caregiver agrees to access appropriate resources in a timely manner as needed and/or as recommended, and to communicate concerns appropriately.     Patient and Caregiver also reports a clear understanding of treatment options available.      Feelings or Concerns: Pt reports  "concerns about the LVAD: that he will not be able to swim and that he will have obvious equipment on his body. Support and education provided. Also educated pt on why some pts have to get LVAD while waiting for transplant.    Coping: Pt reports likes to decompress in a quiet environment. Pt reports he scruggs not dwell on things. Pt also watches TV and family plays "[employer] of Duty: Zombies" together.    Goals: Pt reports would like to see a reversal in the things taken away from him due to worsening health such as having more energy and change in personality due to not being able to do what he used to do.     Interview Behavior: Patient and Caregiver presents as alert and oriented x 4, pleasant, good eye contact, calm, communicative, cooperative and asking and answering questions appropriately.  Wife was present via speaker phone for caregiver education and at end of assessment. Wife states ability to attend future appts with pt.       Transplant Social Work - Candidacy  Assessment/Plan:     Psychosocial Suitability: Patient presents as a suitable candidate for LVAD or transplant at this time pending pt comes up with a back up caregiver. Based on psychosocial risk factors, patient presents as low risk, due to coping, insight, family support. Pt's job is eligible for STD, LTD and FMLA at this time.Pt does not have outstanding legal issues. .    Recommendations/Additional Comments: Pt needs to name back up caregiver and wife needs to accompany pt to clinic appointments.    Maddy Abraham LCSW       "

## 2018-02-02 DIAGNOSIS — Z92.29 PERSONAL HISTORY OF LONG-TERM (CURRENT) USE OF ANTICOAGULANTS: ICD-10-CM

## 2018-02-02 DIAGNOSIS — Z76.82 ORGAN TRANSPLANT CANDIDATE: ICD-10-CM

## 2018-02-02 DIAGNOSIS — I50.9 CONGESTIVE HEART FAILURE, UNSPECIFIED CONGESTIVE HEART FAILURE CHRONICITY, UNSPECIFIED CONGESTIVE HEART FAILURE TYPE: Primary | ICD-10-CM

## 2018-02-02 DIAGNOSIS — E03.9 HYPOTHYROIDISM, UNSPECIFIED TYPE: ICD-10-CM

## 2018-02-02 DIAGNOSIS — I50.22 CHRONIC SYSTOLIC HEART FAILURE: ICD-10-CM

## 2018-02-02 NOTE — TELEPHONE ENCOUNTER
----- Message from Magi Ybarra sent at 2/1/2018 11:52 AM CST -----  He's back tracked even more and told me he is ONLY available Thursdays and Fridays after 3pm.    Some of his testing isnt available that late in the day at all.       ----- Message -----  From: Antonio Hadley Jr., MD  Sent: 2/1/2018  11:47 AM  To: Magi Ybarra    Happy to talk with him at upcoming clinic visit  I feel that hs financial situation is the force behind this, not his compliance or personality  Misael  ----- Message -----  From: Magi Ybarra  Sent: 1/23/2018   8:32 AM  To: MD CHUCKY Ayon Jr.I    He's not making eval easy to accomplish.

## 2018-02-02 NOTE — TELEPHONE ENCOUNTER
I spoke with wife over the phone today and she is aware of his condition and our goal to complete his evaluation so that we can determine his eligibility for advanced options.  She reports that he has been feeling well but did gain 5# past few days.  I asked her to have him take bumetanide 4 mg BID until wt down and then return to 4 mg AM and 2 mg PM.  For now will ask that he obtain BMP every 1-2 weeks--can have drawn on inEarth or here when he comes for other tests--start with BMP next week.

## 2018-02-03 LAB
COTININE SERPL-MCNC: <3 NG/ML
NICOTINE SERPL-MCNC: <3 NG/ML

## 2018-02-07 ENCOUNTER — CLINICAL SUPPORT (OUTPATIENT)
Dept: CARDIOLOGY | Facility: CLINIC | Age: 53
End: 2018-02-07
Attending: INTERNAL MEDICINE
Payer: COMMERCIAL

## 2018-02-07 ENCOUNTER — HOSPITAL ENCOUNTER (OUTPATIENT)
Dept: PULMONOLOGY | Facility: CLINIC | Age: 53
Discharge: HOME | End: 2018-02-07
Attending: INTERNAL MEDICINE
Payer: COMMERCIAL

## 2018-02-07 DIAGNOSIS — R06.02 SHORTNESS OF BREATH: ICD-10-CM

## 2018-02-07 DIAGNOSIS — I73.9 PERIPHERAL VASCULAR DISEASE: ICD-10-CM

## 2018-02-07 DIAGNOSIS — Z76.82 ORGAN TRANSPLANT CANDIDATE: ICD-10-CM

## 2018-02-07 LAB — VASCULAR ANKLE BRACHIAL INDEX (ABI) LEFT: 1.05 (ref 0.9–1.2)

## 2018-02-07 PROCEDURE — 93922 UPR/L XTREMITY ART 2 LEVELS: CPT | Mod: S$GLB,TXP,, | Performed by: INTERNAL MEDICINE

## 2018-02-08 ENCOUNTER — DOCUMENTATION ONLY (OUTPATIENT)
Dept: TRANSPLANT | Facility: CLINIC | Age: 53
End: 2018-02-08

## 2018-02-08 NOTE — PROGRESS NOTES
Met with pt in the lobby after his SADAF.  He was unable to do 6mwt b/c he was late arriving for SADAF then it was too late for the the 6mwt.    Reviewed with pt that several evaluation tests are not available late in the day which is pt's ONLY availability for testing.  Discussed that he can not be considered for OHT or VAD until all of his evaluation is complete.   Pt reports he doesn't want to miss work but understands he needs to have testing done.     Advised pt to discuss with his boss about coming to work late for 1-2 days in order to have testing done in the AM, or taking 1 vacation day and have all of his testing done on the same day.   Pt will call back with dates/times once he is able to do that.

## 2018-02-12 ENCOUNTER — TELEPHONE (OUTPATIENT)
Dept: FAMILY MEDICINE | Facility: CLINIC | Age: 53
End: 2018-02-12

## 2018-02-12 DIAGNOSIS — E87.70 HYPERVOLEMIA, UNSPECIFIED HYPERVOLEMIA TYPE: ICD-10-CM

## 2018-02-12 RX ORDER — METOLAZONE 2.5 MG/1
TABLET ORAL
Qty: 15 TABLET | Refills: 3 | OUTPATIENT
Start: 2018-02-12

## 2018-02-12 RX ORDER — VALSARTAN 80 MG/1
40 TABLET ORAL 2 TIMES DAILY
Qty: 30 TABLET | Refills: 1 | Status: ON HOLD | OUTPATIENT
Start: 2018-02-12 | End: 2018-03-26 | Stop reason: HOSPADM

## 2018-02-12 NOTE — TELEPHONE ENCOUNTER
----- Message from Xochitl Forde sent at 2/12/2018  2:41 PM CST -----  Contact: self  Patient would like a refill on predniSONE (DELTASONE) 20 MG tablet . Patient can be reached at [phone].      [facility] [address]

## 2018-02-15 ENCOUNTER — TELEPHONE (OUTPATIENT)
Dept: TRANSPLANT | Facility: CLINIC | Age: 53
End: 2018-02-15

## 2018-02-15 DIAGNOSIS — Z76.82 HEART TRANSPLANT CANDIDATE: Primary | ICD-10-CM

## 2018-02-15 NOTE — TELEPHONE ENCOUNTER
Left voicemail for pt requesting call back to confirm meds per Dr Hadley's request.     When pt returns call, also need his availability for the remainder of his eval testing as the remaining items can not be done on Thurs/Fri after 230pm.  Pt was supposed to discuss with his boss and alert coordinator when he could come for testing.

## 2018-02-15 NOTE — TELEPHONE ENCOUNTER
----- Message from Kristy Levy RN sent at 2/15/2018 11:46 AM CST -----  FYI.....  ----- Message -----  From: Antonio Hadley Jr., MD  Sent: 2/14/2018  10:28 PM  To: Kristy Levy RN    I will ask coordinator to call tomorrow and confirm loop diuretic, KCL and spironolactone dosing that he is taking at this time as he will need adjustment made tomorrow--Thursday

## 2018-02-15 NOTE — TELEPHONE ENCOUNTER
"Received call back from pt's wife, Kelly, stating "pt told me you called and asked me to call you back".  Advised wife that I called pt directly because I needed to confirm his med dosages. Per wife "he doesn't know his meds"   Advised wife that if pt is to move forward with LVAD or transplant consideration that HE must know his own medications as it is a matter of life & death if he does not take them correctly.   Wife voiced understanding.       Wife reports that pt is currently taking Bumex 4mg qam, 2mg qpm; KCL 20meq TID and is holding aldactone at this time.  Pt's weight has only fluctuated up and down by 1 lb recently.       Reviewed this with Dr Donnelly.  Order received for pt to increase KCL to 40meq BID and restart aldactone 25mg daily and recheck bmp next week for further med titration.     Instructed wife on med changes and need for bmp next week.  Bmp scheduled for Wednesday 2/21/18 at [facility].  Wife will instruct pt on above.     Also informed wife that I am still awaiting pt to give his availability for remaining eval items that can not be done in the late PM.  Wife will discuss with pt.       "

## 2018-02-16 ENCOUNTER — TELEPHONE (OUTPATIENT)
Dept: TRANSPLANT | Facility: CLINIC | Age: 53
End: 2018-02-16

## 2018-02-16 NOTE — TELEPHONE ENCOUNTER
Received a call from pt requesting to have the remaining eval items scheduled on 3/1/18.   Schedulers notified.

## 2018-02-21 DIAGNOSIS — I50.9 CONGESTIVE HEART FAILURE, UNSPECIFIED CONGESTIVE HEART FAILURE CHRONICITY, UNSPECIFIED CONGESTIVE HEART FAILURE TYPE: Primary | ICD-10-CM

## 2018-02-21 DIAGNOSIS — E87.5 HYPERKALEMIA: ICD-10-CM

## 2018-02-21 NOTE — TELEPHONE ENCOUNTER
Reviewed today's labs with Dr Hadley.  Creat up to 2.6.    Per Dr Hadley, pt to hold KCL and bumex tonight then change KCL to 20meq BID and Bumex to 2mg BID. He will continue on the aldactone for now.  He needs BMP stat on Friday then labs as scheduled on 3/1/18.      Orders entered & scheduled.      Instructed wife on med changes and lab orders. Pt did not answer his phone.  Wife voiced understanding by repeating directions.

## 2018-02-23 ENCOUNTER — OFFICE VISIT (OUTPATIENT)
Dept: INFECTIOUS DISEASES | Facility: CLINIC | Age: 53
End: 2018-02-23
Payer: COMMERCIAL

## 2018-02-23 VITALS — HEIGHT: 73 IN | WEIGHT: 268.94 LBS | BODY MASS INDEX: 35.64 KG/M2 | TEMPERATURE: 98 F

## 2018-02-23 DIAGNOSIS — Z01.818 ENCOUNTER FOR PRE-TRANSPLANT EVALUATION FOR HEART TRANSPLANT: ICD-10-CM

## 2018-02-23 DIAGNOSIS — I50.9 ACUTE DECOMPENSATED HEART FAILURE: Primary | ICD-10-CM

## 2018-02-23 DIAGNOSIS — Z76.82 ORGAN TRANSPLANT CANDIDATE: ICD-10-CM

## 2018-02-23 PROCEDURE — 90472 IMMUNIZATION ADMIN EACH ADD: CPT | Mod: S$GLB,TXP,, | Performed by: INTERNAL MEDICINE

## 2018-02-23 PROCEDURE — 90471 IMMUNIZATION ADMIN: CPT | Mod: S$GLB,TXP,, | Performed by: INTERNAL MEDICINE

## 2018-02-23 PROCEDURE — 90715 TDAP VACCINE 7 YRS/> IM: CPT | Mod: S$GLB,TXP,, | Performed by: INTERNAL MEDICINE

## 2018-02-23 PROCEDURE — 3008F BODY MASS INDEX DOCD: CPT | Mod: S$GLB,,, | Performed by: PHYSICIAN ASSISTANT

## 2018-02-23 PROCEDURE — 99999 PR PBB SHADOW E&M-EST. PATIENT-LVL III: CPT | Mod: PBBFAC,TXP,, | Performed by: PHYSICIAN ASSISTANT

## 2018-02-23 PROCEDURE — 90636 HEP A/HEP B VACC ADULT IM: CPT | Mod: S$GLB,TXP,, | Performed by: INTERNAL MEDICINE

## 2018-02-23 PROCEDURE — 99204 OFFICE O/P NEW MOD 45 MIN: CPT | Mod: 25,S$GLB,, | Performed by: PHYSICIAN ASSISTANT

## 2018-02-23 RX ORDER — FUROSEMIDE 40 MG/1
TABLET ORAL
Refills: 2 | COMMUNITY
Start: 2017-11-20 | End: 2018-02-27

## 2018-02-23 RX ORDER — FUROSEMIDE 80 MG/1
80 TABLET ORAL 2 TIMES DAILY
Refills: 11 | COMMUNITY
Start: 2017-12-23 | End: 2018-02-27

## 2018-02-23 RX ORDER — LOSARTAN POTASSIUM 25 MG/1
25 TABLET ORAL DAILY
Refills: 1 | Status: ON HOLD | COMMUNITY
Start: 2017-12-07 | End: 2018-03-26 | Stop reason: HOSPADM

## 2018-02-23 RX ORDER — CARVEDILOL 3.12 MG/1
TABLET ORAL
Refills: 11 | COMMUNITY
Start: 2017-12-27 | End: 2018-03-01

## 2018-02-23 RX ORDER — METOLAZONE 2.5 MG/1
2.5 TABLET ORAL EVERY OTHER DAY
Refills: 3 | Status: ON HOLD | COMMUNITY
Start: 2018-01-15 | End: 2018-03-26 | Stop reason: HOSPADM

## 2018-02-23 NOTE — LETTER
February 23, 2018      Antonio Hadley Jr., MD  [address]  [facility] [address]           [facility]  [address]  [facility] [address]  Phone: [phone]  Fax: [phone]          Patient: Tim Richards   MR Number: [id number]   YOB: 1965   Date of Visit: 2/23/2018       Dear Dr. Antonio Hadley Jr.:    Thank you for referring Tim Richards to me for evaluation. Attached you will find relevant portions of my assessment and plan of care.    If you have questions, please do not hesitate to call me. I look forward to following Tim Richards along with you.    Sincerely,    Fitz Michel Jr., PA    Enclosure  CC:  No Recipients    If you would like to receive this communication electronically, please contact [email or web address] or [phone] to request more information on [employer] Link access.    For providers and/or their staff who would like to refer a patient to [facility], please contact us through our one-stop-shop provider referral line, [facility], at [phone].    If you feel you have received this communication in error or would no longer like to receive these types of communications, please e-mail [email or web address]

## 2018-02-23 NOTE — PROGRESS NOTES
Pt received his Tdap and Hepatitis A/B Vaccinations. Pt tolerated injections well. Pt did not want to make a return appt at this time. He hopes to gets his future vaccinations at the [place] location. Department phone number provided. Pt updated that next Hepatitis A/B due in 1 month. Pt left the unit in NAD.

## 2018-02-23 NOTE — PROGRESS NOTES
Pre Transplant Infectious Diseases Consult  Heart Transplant Recipient Evaluation    Requesting Physician: Dr. Antonio Hadley    Reason for Visit:    Chief Complaint   Patient presents with    Heart Transplant Pre-evaluation     History of Present Illness  Tim Richards is a 52 y.o. year old Black or  male with advanced Heart disease currently being evaluated forLVAD/ Heart transplant.  Patient denies any recent fever, chills, or infective illnesses.      1) Do you have a history of:   YES NO   Diabetes      [] [x]     Wound/ Foot Infection/Bone Infection   []        [x]     Surgical Removal of Spleen   []  [x]                  2) Have you had recurrent infections involving:             YES NO  Sinus infections  []         [x]   Sore Throat   []         [x]                 Prostate Infections  []         [x]              Bladder Infections  []         [x]                     Kidney Infections  []         [x]                               Intestinal Infections  []         [x]      Skin Infections   []         [x]       Reproductive Infections          []  [x]   Periodontal Disease  []         [x]        3)Have you ever had: YES     NO UNKNOWN      Chicken Pox   [x]         []  []   Shingles   []         [x]  []   Orolabial Herpes             []  [x]  []   Genital Herpes  [x]         []  []   Cytomegalovirus  []         [x]  []   Maribel-Barr Virus  []         [x]  []   Genital Warts   []         [x]  []   Hepatitis A   []         [x]  []   Hepatitis B   []         [x]  []   Hepatitis C   []         [x]  []   Syphilis   []         [x]  []   Gonorrhea   [x]         []  [] Teens treated without recurrence  Pelvic Inflammatory   Disease   []         []  []   Chlamydia Infection  []         [x]  []   Intestinal parasites   or worms   []         [x]  []   Fungal Infections  []         [x]  []   Blood Infections  []         [x]  []       Comment:       4) Have you ever been exposed   YES NO  To someone  with tuberculosis?  []   [x]   If yes, what treatment did you receive:     5) What states have you lived in? [place], [place]    6) What countries have you visited for more than 2 weeks? [place] 1-2 weeks                     YES NO  7) Did you have any associated infections?  []  [x]       8) Are you planning to travel outside the    [x]  []   [place] after your transplant?    9) Household                   YES NO  Do you have pets living in your house?    []         [x]   If yes, describe:     Do you spend time or live on a farm or     []         [x]   have livestock or other farm animals?  If yes, which ones:    Do you have a fish tank? Wife cleans        [x]  []       Do you have a litter box?      []         [x]     Do you fish or hunt?  Fish      [x]         []     Do you clean or skin fish or animals?    [x]         []     Do you consume raw or undercooked    [x]         []   meat, fish, or shellfish? All      10) What occupations have you had?     11) Patient reports hobby to be boating.          12)Do you garden or otherwise  YES NO   work in the soil?    []         [x]   13)Do you hike, camp, or spend     time in wooded areas?   []         [x]        14) The patient's immunization history was reviewed.    Have you ever received:  YES NO UNKNOWN DATES   Routine Childhood vaccines  [x]         []  []      Influenza vaccine   [x]  []  []    Pneumovax/Prevnar   [x]  []  []     Tetanus-diptheria   []         [x]  []    Hepatitis A vaccine series       []  [x]  []    Hepatitis B vaccine series         []  [x]  []     Meningitis vaccine   []         [x]  []    Varicella vaccine   []         [x]  []   Immunizations                Influenza 11/1/2014          Influenza - Quadrivalent - PF 10/5/2017, 9/21/2016, 9/23/2015          Influenza - Trivalent - PF (PED) 11/1/2014          Pneumococcal Conjugate - 13 Valent 11/1/2014          Pneumococcal Polysaccharide - 23 Valent 3/24/2016, 10/1/2015              Based on the patients immunization history and serologies, immunizations were ordered:         Ordered  Not Ordered  Influenza Vaccine     []    [x]   Hepatitis A series at 0,  6 months   [x]    []   Hepatitis B seriesat 0, 1, and 6 months  [x]    []   Hepatitis B High Dose 0,1, and 6 months  []    [x]   Prevnar      []    [x]   Pneumovax      []    [x]    TDap       [x]    []    Zoster       []    [x]   Menactra      []    [x]            The patient was encouraged to contact us about any problems that may develop after immunization and possible side effects were reviewed.      Previous Transplant: no    Etiology of Liver Disease: CHF    Allergies: Lisinopril  Immunization History   Administered Date(s) Administered    Hepatitis A / Hepatitis B 02/23/2018    Influenza 11/01/2014    Influenza - Quadrivalent - PF 09/23/2015, 09/21/2016, 10/05/2017    Influenza - Trivalent - PF (PED) 11/01/2014    Pneumococcal Conjugate - 13 Valent 11/01/2014    Pneumococcal Polysaccharide - 23 Valent 10/01/2015, 03/24/2016    Tdap 02/23/2018     Past Medical History:   Diagnosis Date    CHF (congestive heart failure)     Dilated cardiomyopathy 1/10/2018    Disorder of kidney and ureter     CKD    Gout     HTN (hypertension)     Ventricular tachycardia (paroxysmal)      Past Surgical History:   Procedure Laterality Date    CARDIAC CATHETERIZATION  Dec. 2012    CARDIAC DEFIBRILLATOR PLACEMENT Left     CRRT-D      Social History     Social History    Marital status:      Spouse name: N/A    Number of children: N/A    Years of education: N/A     Occupational History     [employer]      Social History Main Topics    Smoking status: Former Smoker     Packs/day: 1.00     Years: 31.00     Types: Cigarettes     Quit date: 1/12/2018    Smokeless tobacco: Never Used      Comment: pt is quiting on his own - pt stated not qualified for program; 2/16 pt  quit on his own    Alcohol use No      Comment: one  fifth of gin or rum/week    Drug use: No    Sexual activity: Yes     Partners: Female     Birth control/ protection: None      Comment: 10/5/17  with same partner 7 years      Other Topics Concern    Not on file     Social History Narrative    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   HENT: Negative for congestion, ear pain, hearing loss, hoarse voice, sore throat and tinnitus.    Eyes: Negative for blurred vision, redness and visual disturbance.   Cardiovascular: Negative for chest pain, leg swelling and palpitations.   Respiratory: Negative for cough, hemoptysis, shortness of breath, sputum production and wheezing.    Endocrine: Negative for cold intolerance and heat intolerance.   Hematologic/Lymphatic: Negative for adenopathy. Does not bruise/bleed easily.   Skin: Negative for dry skin, itching, rash and suspicious lesions.   Musculoskeletal: Negative for back pain, joint pain, myalgias and neck pain.   Gastrointestinal: Negative for abdominal pain, constipation, diarrhea, heartburn, nausea and vomiting.   Genitourinary: Negative for dysuria, flank pain, frequency, hematuria, hesitancy and urgency.   Neurological: Negative for dizziness, headaches, numbness and paresthesias.   Psychiatric/Behavioral: Negative for depression and memory loss. The patient does not have insomnia and is not nervous/anxious.    Allergic/Immunologic: Negative for environmental allergies, HIV exposure, hives and persistent infections.     Physical Exam   Constitutional: He is oriented to person, place, and time. He appears well-developed and well-nourished.       HENT:   Head: Normocephalic and atraumatic.   Mouth/Throat: Uvula is midline, oropharynx is clear and moist and mucous membranes are normal. He does not have dentures. No oral lesions. Abnormal dentition (some  broken teeth). Dental caries present. No dental abscesses or lacerations.       Eyes: Conjunctivae and lids are normal. Pupils are equal, round, and reactive to light. No scleral icterus.   Neck: Neck supple.   Cardiovascular: Normal rate and regular rhythm.  Exam reveals no gallop and no friction rub.    No murmur heard.  Pulmonary/Chest: Effort normal and breath sounds normal. No respiratory distress. He has no decreased breath sounds. He has no wheezes. He has no rhonchi. He has no rales.   Abdominal: Soft. Normal appearance, normal aorta and bowel sounds are normal. He exhibits no distension and no mass. There is no hepatosplenomegaly. There is no tenderness. There is no rebound and no guarding.   Musculoskeletal: He exhibits edema (mild LE).   Lymphadenopathy:        Head (right side): No submental, no submandibular, no tonsillar, no preauricular, no posterior auricular and no occipital adenopathy present.        Head (left side): No submental, no submandibular, no tonsillar, no preauricular, no posterior auricular and no occipital adenopathy present.     He has no cervical adenopathy.     He has no axillary adenopathy.        Right: No inguinal, no supraclavicular and no epitrochlear adenopathy present.        Left: No inguinal, no supraclavicular and no epitrochlear adenopathy present.   Neurological: He is alert and oriented to person, place, and time. No cranial nerve deficit.   Skin: Skin is warm, dry and intact. No lesion and no rash noted. He is not diaphoretic. No erythema. No pallor.   Psychiatric: He has a normal mood and affect. His behavior is normal.     Diagnostics:   RPR   Date Value Ref Range Status   01/23/2018 Non-reactive Non-reactive Final     No results found for: CMVANTIBODIE  No results found for: HIV1X2  No results found for: HTLVIIIANTIB  Hepatitis B Surface Ag   Date Value Ref Range Status   01/23/2018 Negative  Final     Hep B Core Total Ab   Date Value Ref Range Status   01/23/2018  Negative  Final     Hepatitis C Ab   Date Value Ref Range Status   01/23/2018 Negative  Final     Toxoplasma gondii IGG   Date Value Ref Range Status   01/23/2018 <5.0 0.0 - 6.4 IU/mL Final     No components found for: TOXOIGGINTER  HSV 1 IgG   Date Value Ref Range Status   01/23/2018 Positive (A) Negative Final     HSV 2 IgG   Date Value Ref Range Status   01/23/2018 Positive (A) Negative Final     Varicella IgG   Date Value Ref Range Status   01/23/2018 4.61 (H) 0.00 - 0.90 ISR Final     Varicella Interpretation   Date Value Ref Range Status   01/23/2018 Positive (A) Negative Final     Comment:     <or=0.90     Negative        No detectable IgG antibody to Varicella zoster  by the [facility] test. Such individuals are presumed to be   uninfected with Varicella zoster and to be susceptible to   primary infection.  0.91-1.09    Equivocal  >or=1.10     Positive        Indicates presence of detectable IgG antibody to Varicella   zoster by the [facility] test. Indicative of previous or current   infection.       Strongyloides Ab IgG   Date Value Ref Range Status   01/23/2018 Negative Negative Final     Comment:     No detectable levels of IgG antibodies to Strongyloides.  Repeat testing in 1-2 weeks if clinically indicated.  Test Performed by:  [facility]  [address] [place] [address]       Maribel-Barr Virus IgG (VCA)   Date Value Ref Range Status   01/23/2018 Positive (A) Negative Final     Hep B S Ab   Date Value Ref Range Status   01/23/2018 Negative  Final     No results found for: QUANTIFERON  No results found for: HEPAIGM  No results found for: PPD  No results found for this or any previous visit.     Ref. Range 10/7/2011 13:09 9/23/2015 08:30 1/23/2018 18:28 1/23/2018 18:29   Hep B Core Total Ab Unknown Negative   Negative   Hep B S Ab Unknown Negative   Negative   Hepatitis B Surface Ag Unknown Negative   Negative   Hepatitis C Ab Unknown Negative   Negative    Influenza A Ag, EIA Latest Ref Range: Negative   Negative     Influenza B Ag, EIA Latest Ref Range: Negative   Negative     HIV 1/2 Ag/Ab Latest Ref Range: Negative     Negative   RPR Latest Ref Range: Non-reactive     Non-reactive   CMV IgG Interpretation Unknown   Reactive (A)    Maribel-Barr Virus IgG (VCA) Latest Ref Range: Negative     Positive (A)   HSV 1 IgG Latest Ref Range: Negative     Positive (A)   HSV 2 IgG Latest Ref Range: Negative     Positive (A)   Strongyloides Ab IgG Latest Ref Range: Negative    Negative    Varicella IgG Latest Ref Range: 0.00 - 0.90 ISR   4.61 (H)    Varicella Interpretation Latest Ref Range: Negative    Positive (A)       Ref. Range 1/23/2018 18:29   Hepatitis A Antibody IgG Unknown Negative      Ref. Range 1/23/2018 18:29   Toxoplasma gondii IGG Latest Ref Range: 0.0 - 6.4 IU/mL <5.0        Ref. Range 1/23/2018 18:29   Toxoplasma IGG Interpretation Unknown Non-reactive       Radiology:  2D echo with color flow doppler   Order: [id number]   Status:  Final result   Visible to patient:  Yes (Patient Portal) Next appt:  03/01/2018 at 08:00 AM in Lab (LAB, APPOINTMENT [place]) Dx:  CHF (congestive heart failure); Acute...   Details     Narrative     Date of Procedure: 01/13/2018        TEST DESCRIPTION   Technical Quality: This is a portable study performed at the patient's bedside. This is a technically adequate study.     General: A catheter is present in the right-sided cardiac chambers.     Aorta: The aortic root is normal in size, measuring 2.5 cm at sinotubular junction and 3.0 cm at Sinuses of Valsalva. The proximal ascending aorta is normal in size, measuring 2.7 cm across.     Left Atrium: The left atrial volume index is severely enlarged, measuring 57.56 cc/m2.     Left Ventricle: The left ventricle is severely enlarged, with an end-diastolic diameter of 9.5 cm, and an end-systolic diameter of 9.4 cm. LV wall thickness is normal, with the septum measuring 0.8 cm and  the posterior wall measuring 1.1 cm across.   Relative wall thickness was normal at 0.23, and the LV mass index was increased at 268.2 g/m2 consistent with eccentric left ventricular hypertrophy. There is global hypokinesis. Left ventricular systolic function appears severely depressed. Visually   estimated ejection fraction is 10-15%. The LV Doppler derived stroke volume equals 25.0 ccs.         Right Atrium: The right atrium is enlarged, measuring 6.1 cm in length and 5.0 cm in width in the apical view.     Right Ventricle: The right ventricle is mildly enlarged measuring 4.6 cm at the base in the apical right ventricle-focused view. Global right ventricular systolic function appears low normal. Tricuspid annular plane systolic excursion (TAPSE) is 2.2 cm.   Tissue Doppler-derived tricuspid annular peak systolic velocity (S prime) is 9.4 cm/s. The estimated PA systolic pressure is 47 mmHg.     Aortic Valve:  Additionally, there is trivial aortic regurgitation. Aortic valve is normal in structure with normal leaflet mobility.     Mitral Valve:  There is severe mitral regurgitation. Mitral valve is normal in structure with normal leaflet mobility.     Tricuspid Valve:  There is mild tricuspid regurgitation. Tricuspid valve is normal in structure with normal leaflet mobility.     Pulmonary Valve:  Pulmonary valve is normal in structure with normal leaflet mobility.     IVC: IVC is enlarged but collapses > 50% with a sniff, suggesting intermediate right atrial pressure of 8 mmHg.     Intracavitary: There is no evidence of pericardial effusion, intracavity mass, thrombi, or vegetation.         CONCLUSIONS     1 - Severe left ventricular enlargement.     2 - Severely depressed left ventricular systolic function (EF 10-15%).     3 - Right ventricular enlargement with low normal systolic function.     4 - Biatrial enlargement.     5 - Severe functional mitral regurgitation.     6 - Mild tricuspid regurgitation.     7 -  Pulmonary hypertension. The estimated PA systolic pressure is 47 mmHg.     8 - Intermediate central venous pressure.              CT Chest Without Contrast   Status: Final result   MyChart Results Release     MyChart Status: Active Results Release   PACS Images     Show images for CT Chest Without Contrast   External Result Report     External Result Report   Narrative     Routine CT of the Chest, Abdomen, and Pelvis without IV contrast      Technique: Axial CT images of the chest, abdomen, and pelvis were acquired without IV contrast.  Reformatted coronal and sagittal images were acquired.         HISTORY:  52 year old M with heart failure.  Referred  for LVAD workup.     COMPARISON: Chest radiograph 12/21/2017.  CT chest 09/12/2014.     FINDINGS:    Thoracic soft tissues: Left-sided cardiac pacer device with 3 transvenous leads extending to the heart.     Aorta: Normal in course and caliber, without significant atherosclerotic plaque. There are three branching vessels at the arch.       Heart: Enlarged in size. No pericardial effusion.     Lizzie/Mediastinum: No significant lymphadenopathy     Lungs: Well aerated, without consolidation or pleural fluid. Bandlike opacity within the LEFT upper lobe and RIGHT middle lobe, likely atelectasis.  Bibasilar groundglass opacity, likely representing pulmonary edema.    Liver: Normal in size and attenuation, with no focal hepatic lesions.       Gallbladder: No calcified gallstones.     Bile Ducts: No evidence of dilated ducts.     Pancreas: No mass or peripancreatic fat stranding.      Spleen: Unremarkable.     Adrenals: Unremarkable.     Kidneys/ Ureters: Normal in size and location.  No hydronephrosis or nephrolithiasis. No ureteral dilatation.     Bladder: No evidence of wall thickening.     Reproductive organs: Unremarkable.     GI Tract/Mesentery: No evidence of bowel obstruction or inflammation.     Peritoneal Space: No ascites. No free air.     Retroperitoneum:  No  significant adenopathy.      Abdominal wall:  Unremarkable.      Vasculature: Mild dilatation of the pulmonary trunk.  No atherosclerotic calcification or aneurysm.     Bones: No acute fracture. Degenerative joint changes of the spine and hip.   Impression        Cardiomegaly. Left-sided pacer device with leads extending to the heart.    No significant abnormality in abdomen and pelvis.    Additional findings as above.    ______________________________________     Electronically signed by resident: CHAYA GUERRERO          CT Head Without Contrast   Status: Final result   MyChart Results Release     [email or web address] Status: Active Results Release   PACS Images     Show images for CT Head Without Contrast   External Result Report     External Result Report   Narrative     Head CT without contrast.    Comparison: 7/31/17    Results: The brain is normally formed.  The ventricular system is normal in size, midline.  No areas of hypoattenuation seen to suggest an              Transplant Infectious Diseases - Candidacy    Assessment/Plan:     Transplant Candidacy: Based on available information, there are no identified significant barriers to transplantation from an infectious disease standpoint pending a negative Coccidoides IgG.  Patient is HSV-2 positive and may be at risk for increased lesions post transplant and would recommend suppressive medication at that time.  Final determination of transplant candidacy will be made once evaluation is complete and reviewed by the Transplant Selection Committee.    YANET Rueda  Vaccine and Serology needs:  1. Twinrix series  2. Tdap       Counseling:  Patient is HSV-2 positive and may be at risk for increased lesions post transplant and would recommend suppressive medication at that time.  Transmission risk was discussed. I discussed with Tim the risk for increased susceptibility to infections following transplantation including increased risk for infection right after  transplant and if rejection should occur.  The patients has been counseled on the importance of vaccinations including but not limited to a yearly flu vaccine.  Specific guidance has been provided to the patient regarding the patients occupation, hobbies and activities to avoid future infectious complications including but not limited to avoiding undercooked meats and seafood, proper hygiene, and contact with animals.

## 2018-02-26 ENCOUNTER — DOCUMENTATION ONLY (OUTPATIENT)
Dept: TRANSPLANT | Facility: CLINIC | Age: 53
End: 2018-02-26

## 2018-02-26 NOTE — PROGRESS NOTES
Reviewed labs dated 2/23/18 with DR Hadley.   Creat still 2.6.   Pt is scheduled for eval testing and repeat labs on 3/1.  Per MD, no med changes at this time.   Pt notified.

## 2018-02-27 RX ORDER — BUMETANIDE 2 MG/1
2 TABLET ORAL 2 TIMES DAILY
Qty: 120 TABLET | Refills: 2 | Status: ON HOLD
Start: 2018-02-27 | End: 2018-03-26 | Stop reason: HOSPADM

## 2018-02-27 RX ORDER — POTASSIUM CHLORIDE 750 MG/1
20 TABLET, EXTENDED RELEASE ORAL 2 TIMES DAILY
Qty: 240 TABLET | Refills: 2 | Status: ON HOLD
Start: 2018-02-27 | End: 2018-03-26

## 2018-03-01 ENCOUNTER — TELEPHONE (OUTPATIENT)
Dept: ADMINISTRATIVE | Facility: HOSPITAL | Age: 53
End: 2018-03-01

## 2018-03-01 ENCOUNTER — HOSPITAL ENCOUNTER (INPATIENT)
Facility: HOSPITAL | Age: 53
LOS: 25 days | Discharge: HOME | DRG: 001 | End: 2018-03-26
Attending: INTERNAL MEDICINE | Admitting: INTERNAL MEDICINE
Payer: COMMERCIAL

## 2018-03-01 ENCOUNTER — NUTRITION (OUTPATIENT)
Dept: TRANSPLANT | Facility: CLINIC | Age: 53
DRG: 001 | End: 2018-03-01
Payer: COMMERCIAL

## 2018-03-01 ENCOUNTER — OFFICE VISIT (OUTPATIENT)
Dept: TRANSPLANT | Facility: CLINIC | Age: 53
DRG: 001 | End: 2018-03-01
Payer: COMMERCIAL

## 2018-03-01 ENCOUNTER — HOSPITAL ENCOUNTER (OUTPATIENT)
Dept: PULMONOLOGY | Facility: CLINIC | Age: 53
Discharge: HOME | DRG: 001 | End: 2018-03-01
Attending: INTERNAL MEDICINE
Payer: COMMERCIAL

## 2018-03-01 VITALS
DIASTOLIC BLOOD PRESSURE: 57 MMHG | WEIGHT: 265.88 LBS | OXYGEN SATURATION: 97 % | WEIGHT: 264.31 LBS | BODY MASS INDEX: 34.87 KG/M2 | SYSTOLIC BLOOD PRESSURE: 82 MMHG | HEART RATE: 86 BPM | BODY MASS INDEX: 35.24 KG/M2 | HEIGHT: 73 IN

## 2018-03-01 DIAGNOSIS — N18.30 CKD (CHRONIC KIDNEY DISEASE), STAGE III: ICD-10-CM

## 2018-03-01 DIAGNOSIS — I42.0 DILATED CARDIOMYOPATHY: ICD-10-CM

## 2018-03-01 DIAGNOSIS — I50.22 CHRONIC SYSTOLIC HEART FAILURE: ICD-10-CM

## 2018-03-01 DIAGNOSIS — I50.9 CONGESTIVE HEART FAILURE, UNSPECIFIED CONGESTIVE HEART FAILURE CHRONICITY, UNSPECIFIED CONGESTIVE HEART FAILURE TYPE: ICD-10-CM

## 2018-03-01 DIAGNOSIS — I13.0 HYPERTENSIVE CARDIOVASCULAR-RENAL DISEASE, STAGE 1-4 OR UNSPECIFIED CHRONIC KIDNEY DISEASE, WITH HEART FAILURE: ICD-10-CM

## 2018-03-01 DIAGNOSIS — Z95.810 BIVENTRICULAR IMPLANTABLE CARDIOVERTER-DEFIBRILLATOR IN SITU: ICD-10-CM

## 2018-03-01 DIAGNOSIS — Z71.89 GOALS OF CARE, COUNSELING/DISCUSSION: ICD-10-CM

## 2018-03-01 DIAGNOSIS — I42.9 CARDIOMYOPATHY: ICD-10-CM

## 2018-03-01 DIAGNOSIS — Z51.5 PALLIATIVE CARE ENCOUNTER: ICD-10-CM

## 2018-03-01 DIAGNOSIS — I51.7 CARDIOMEGALY: ICD-10-CM

## 2018-03-01 DIAGNOSIS — Z00.6 EXAMINATION OF PARTICIPANT IN CLINICAL TRIAL: ICD-10-CM

## 2018-03-01 DIAGNOSIS — Z71.3 NUTRITIONAL COUNSELING: ICD-10-CM

## 2018-03-01 DIAGNOSIS — I50.43 ACUTE ON CHRONIC COMBINED SYSTOLIC AND DIASTOLIC HEART FAILURE: ICD-10-CM

## 2018-03-01 DIAGNOSIS — Z95.811 LVAD (LEFT VENTRICULAR ASSIST DEVICE) PRESENT: ICD-10-CM

## 2018-03-01 DIAGNOSIS — E79.0 HYPERURICEMIA: ICD-10-CM

## 2018-03-01 DIAGNOSIS — Z01.818 PRE-OP EVALUATION: ICD-10-CM

## 2018-03-01 DIAGNOSIS — I50.9 HEART FAILURE: ICD-10-CM

## 2018-03-01 DIAGNOSIS — E87.5 HYPERKALEMIA: ICD-10-CM

## 2018-03-01 DIAGNOSIS — I50.43 ACUTE ON CHRONIC COMBINED SYSTOLIC AND DIASTOLIC HEART FAILURE: Primary | ICD-10-CM

## 2018-03-01 DIAGNOSIS — F17.210 CIGARETTE NICOTINE DEPENDENCE, UNCOMPLICATED: ICD-10-CM

## 2018-03-01 DIAGNOSIS — Z76.82 HEART TRANSPLANT CANDIDATE: Primary | ICD-10-CM

## 2018-03-01 DIAGNOSIS — R73.9 HYPERGLYCEMIA: ICD-10-CM

## 2018-03-01 DIAGNOSIS — I50.9 ACUTE DECOMPENSATED HEART FAILURE: ICD-10-CM

## 2018-03-01 DIAGNOSIS — Z76.82 ORGAN TRANSPLANT CANDIDATE: ICD-10-CM

## 2018-03-01 LAB
ALLENS TEST: ABNORMAL
BASOPHILS # BLD AUTO: 0.01 K/UL
BASOPHILS NFR BLD: 0.2 %
DELSYS: ABNORMAL
DIFFERENTIAL METHOD: ABNORMAL
EOSINOPHIL # BLD AUTO: 0.2 K/UL
EOSINOPHIL NFR BLD: 2.8 %
ERYTHROCYTE [DISTWIDTH] IN BLOOD BY AUTOMATED COUNT: 15.8 %
HCO3 UR-SCNC: 28.2 MMOL/L (ref 24–28)
HCT VFR BLD AUTO: 37.9 %
HGB BLD-MCNC: 12.2 G/DL
IMM GRANULOCYTES # BLD AUTO: 0.02 K/UL
IMM GRANULOCYTES NFR BLD AUTO: 0.3 %
INR PPP: 1.1
LYMPHOCYTES # BLD AUTO: 1.3 K/UL
LYMPHOCYTES NFR BLD: 22.9 %
MAGNESIUM SERPL-MCNC: 2.1 MG/DL
MAGNESIUM SERPL-MCNC: 2.1 MG/DL
MCH RBC QN AUTO: 27.4 PG
MCHC RBC AUTO-ENTMCNC: 32.2 G/DL
MCV RBC AUTO: 85 FL
MONOCYTES # BLD AUTO: 0.6 K/UL
MONOCYTES NFR BLD: 10.3 %
NEUTROPHILS # BLD AUTO: 3.6 K/UL
NEUTROPHILS NFR BLD: 63.5 %
NRBC BLD-RTO: 0 /100 WBC
PCO2 BLDA: 47.9 MMHG (ref 35–45)
PH SMN: 7.38 [PH] (ref 7.35–7.45)
PLATELET # BLD AUTO: 196 K/UL
PMV BLD AUTO: 11.1 FL
PO2 BLDA: 31 MMHG (ref 40–60)
POC BE: 3 MMOL/L
POC SATURATED O2: 56 % (ref 95–100)
POC TCO2: 30 MMOL/L (ref 24–29)
POTASSIUM SERPL-SCNC: 3.4 MMOL/L
PRE FEV1 FVC: 73
PRE FEV1: 2.19
PRE FVC: 3
PREDICTED FEV1 FVC: 80
PREDICTED FEV1: 4.34
PREDICTED FVC: 5.34
PROTHROMBIN TIME: 11.6 SEC
RBC # BLD AUTO: 4.45 M/UL
SAMPLE: ABNORMAL
SITE: ABNORMAL
WBC # BLD AUTO: 5.72 K/UL

## 2018-03-01 PROCEDURE — 82803 BLOOD GASES ANY COMBINATION: CPT | Mod: NTX

## 2018-03-01 PROCEDURE — 99999 PR PBB SHADOW E&M-EST. PATIENT-LVL III: CPT | Mod: PBBFAC,TXP,, | Performed by: INTERNAL MEDICINE

## 2018-03-01 PROCEDURE — 83735 ASSAY OF MAGNESIUM: CPT | Mod: NTX

## 2018-03-01 PROCEDURE — 63600175 PHARM REV CODE 636 W HCPCS: Mod: NTX | Performed by: PHYSICIAN ASSISTANT

## 2018-03-01 PROCEDURE — 93750 INTERROGATION VAD IN PERSON: CPT | Performed by: THORACIC SURGERY (CARDIOTHORACIC VASCULAR SURGERY)

## 2018-03-01 PROCEDURE — 02HV33Z INSERTION OF INFUSION DEVICE INTO SUPERIOR VENA CAVA, PERCUTANEOUS APPROACH: ICD-10-PCS | Performed by: INTERNAL MEDICINE

## 2018-03-01 PROCEDURE — 94729 DIFFUSING CAPACITY: CPT | Mod: S$GLB,,, | Performed by: INTERNAL MEDICINE

## 2018-03-01 PROCEDURE — A4216 STERILE WATER/SALINE, 10 ML: HCPCS | Mod: NTX | Performed by: PHYSICIAN ASSISTANT

## 2018-03-01 PROCEDURE — 36600 WITHDRAWAL OF ARTERIAL BLOOD: CPT | Mod: S$GLB,,, | Performed by: INTERNAL MEDICINE

## 2018-03-01 PROCEDURE — 25000003 PHARM REV CODE 250: Mod: NTX | Performed by: STUDENT IN AN ORGANIZED HEALTH CARE EDUCATION/TRAINING PROGRAM

## 2018-03-01 PROCEDURE — 93750 INTERROGATION VAD IN PERSON: CPT | Performed by: STUDENT IN AN ORGANIZED HEALTH CARE EDUCATION/TRAINING PROGRAM

## 2018-03-01 PROCEDURE — 85610 PROTHROMBIN TIME: CPT | Mod: NTX

## 2018-03-01 PROCEDURE — 25000003 PHARM REV CODE 250: Mod: NTX | Performed by: PHYSICIAN ASSISTANT

## 2018-03-01 PROCEDURE — 20000000 HC ICU ROOM: Mod: NTX

## 2018-03-01 PROCEDURE — 63600175 PHARM REV CODE 636 W HCPCS: Mod: NTX | Performed by: STUDENT IN AN ORGANIZED HEALTH CARE EDUCATION/TRAINING PROGRAM

## 2018-03-01 PROCEDURE — 94010 BREATHING CAPACITY TEST: CPT | Mod: S$GLB,,, | Performed by: INTERNAL MEDICINE

## 2018-03-01 PROCEDURE — 82803 BLOOD GASES ANY COMBINATION: CPT | Mod: S$GLB,,, | Performed by: INTERNAL MEDICINE

## 2018-03-01 PROCEDURE — 99214 OFFICE O/P EST MOD 30 MIN: CPT | Mod: S$GLB,,, | Performed by: INTERNAL MEDICINE

## 2018-03-01 PROCEDURE — 97802 MEDICAL NUTRITION INDIV IN: CPT | Mod: S$GLB,TXP,, | Performed by: DIETITIAN, REGISTERED

## 2018-03-01 PROCEDURE — 85025 COMPLETE CBC W/AUTO DIFF WBC: CPT | Mod: NTX

## 2018-03-01 PROCEDURE — 84132 ASSAY OF SERUM POTASSIUM: CPT | Mod: NTX

## 2018-03-01 PROCEDURE — 94727 GAS DIL/WSHOT DETER LNG VOL: CPT | Mod: S$GLB,,, | Performed by: INTERNAL MEDICINE

## 2018-03-01 PROCEDURE — 99999 PR PBB SHADOW E&M-EST. PATIENT-LVL I: CPT | Mod: PBBFAC,TXP,, | Performed by: DIETITIAN, REGISTERED

## 2018-03-01 PROCEDURE — 99900035 HC TECH TIME PER 15 MIN (STAT): Mod: NTX

## 2018-03-01 PROCEDURE — 83735 ASSAY OF MAGNESIUM: CPT | Mod: 91,NTX

## 2018-03-01 RX ORDER — DOBUTAMINE HYDROCHLORIDE 400 MG/100ML
5 INJECTION, SOLUTION INTRAVENOUS CONTINUOUS
Status: DISCONTINUED | OUTPATIENT
Start: 2018-03-01 | End: 2018-03-08

## 2018-03-01 RX ORDER — AMIODARONE HYDROCHLORIDE 200 MG/1
200 TABLET ORAL DAILY
Status: DISCONTINUED | OUTPATIENT
Start: 2018-03-02 | End: 2018-03-08

## 2018-03-01 RX ORDER — ACETAMINOPHEN 325 MG/1
650 TABLET ORAL ONCE
Status: COMPLETED | OUTPATIENT
Start: 2018-03-01 | End: 2018-03-01

## 2018-03-01 RX ORDER — SODIUM CHLORIDE 0.9 % (FLUSH) 0.9 %
3 SYRINGE (ML) INJECTION EVERY 8 HOURS
Status: DISCONTINUED | OUTPATIENT
Start: 2018-03-01 | End: 2018-03-08

## 2018-03-01 RX ORDER — FUROSEMIDE 10 MG/ML
80 INJECTION INTRAMUSCULAR; INTRAVENOUS ONCE
Status: DISCONTINUED | OUTPATIENT
Start: 2018-03-01 | End: 2018-03-01

## 2018-03-01 RX ORDER — FUROSEMIDE 10 MG/ML
80 INJECTION INTRAMUSCULAR; INTRAVENOUS ONCE
Status: COMPLETED | OUTPATIENT
Start: 2018-03-01 | End: 2018-03-01

## 2018-03-01 RX ORDER — ALLOPURINOL 100 MG/1
100 TABLET ORAL 3 TIMES DAILY
Status: DISCONTINUED | OUTPATIENT
Start: 2018-03-01 | End: 2018-03-08

## 2018-03-01 RX ORDER — TRAMADOL HYDROCHLORIDE 50 MG/1
50 TABLET ORAL ONCE
Status: COMPLETED | OUTPATIENT
Start: 2018-03-01 | End: 2018-03-01

## 2018-03-01 RX ORDER — ASPIRIN 81 MG/1
81 TABLET ORAL DAILY
Status: DISCONTINUED | OUTPATIENT
Start: 2018-03-02 | End: 2018-03-08

## 2018-03-01 RX ADMIN — FUROSEMIDE 80 MG: 10 INJECTION, SOLUTION INTRAMUSCULAR; INTRAVENOUS at 06:03

## 2018-03-01 RX ADMIN — Medication 3 ML: at 10:03

## 2018-03-01 RX ADMIN — ALLOPURINOL 100 MG: 100 TABLET ORAL at 08:03

## 2018-03-01 RX ADMIN — FUROSEMIDE 10 MG/HR: 10 INJECTION, SOLUTION INTRAVENOUS at 08:03

## 2018-03-01 RX ADMIN — ACETAMINOPHEN 650 MG: 325 TABLET, FILM COATED ORAL at 06:03

## 2018-03-01 RX ADMIN — TRAMADOL HYDROCHLORIDE 50 MG: 50 TABLET, FILM COATED ORAL at 10:03

## 2018-03-01 RX ADMIN — DOBUTAMINE IN DEXTROSE 2.5 MCG/KG/MIN: 200 INJECTION, SOLUTION INTRAVENOUS at 06:03

## 2018-03-01 NOTE — LETTER
March 2, 2018        Nader Chiu  [address]  [address]  [place] [address]  Phone: [phone]  Fax: [phone]             [facility] [address] [place] [address]  Phone: [phone]   Patient: Tim Richards   MR Number: [id number]   YOB: 1965   Date of Visit: 3/1/2018       Dear Dr. Nader Chiu    Thank you for referring Tim Richards to me for evaluation. Attached you will find relevant portions of my assessment and plan of care.    If you have questions, please do not hesitate to call me. I look forward to following Tim Richards along with you.    Sincerely,    Amy Rhodes MD    Enclosure    If you would like to receive this communication electronically, please contact [email or web address] or [phone] to request [employer] Link access.    [employer] Link is a tool which provides read-only access to select patient information with whom you have a relationship. Its easy to use and provides real time access to review your patients record including encounter summaries, notes, results, and demographic information.    If you feel you have received this communication in error or would no longer like to receive these types of communications, please e-mail [email or web address]

## 2018-03-01 NOTE — SUBJECTIVE & OBJECTIVE
Past Medical History:   Diagnosis Date    CHF (congestive heart failure)     Dilated cardiomyopathy 1/10/2018    Disorder of kidney and ureter     CKD    Gout     HTN (hypertension)     Ventricular tachycardia (paroxysmal)        Past Surgical History:   Procedure Laterality Date    CARDIAC CATHETERIZATION  Dec. 2012    CARDIAC DEFIBRILLATOR PLACEMENT Left     CRRT-D       Review of patient's allergies indicates:   Allergen Reactions    Lisinopril Anaphylaxis       Current Facility-Administered Medications   Medication    allopurinol tablet 100 mg    [START ON 3/2/2018] amiodarone tablet 200 mg    [START ON 3/2/2018] aspirin EC tablet 81 mg    sodium chloride 0.9% flush 3 mL     Family History     Problem Relation (Age of Onset)    Cancer Sister (54)    Coronary artery disease Father    Diabetes Father    Heart disease Father    Hypertension Father    No Known Problems Mother, Brother        Social History Main Topics    Smoking status: Former Smoker     Packs/day: 1.00     Years: 31.00     Types: Cigarettes     Quit date: 1/12/2018    Smokeless tobacco: Never Used      Comment: pt is quiting on his own - pt stated not qualified for program; 2/16 pt  quit on his own    Alcohol use No      Comment: one fifth of gin or rum/week    Drug use: No    Sexual activity: Yes     Partners: Female     Birth control/ protection: None      Comment: 10/5/17  with same partner 7 years      Review of Systems   Constitutional: Negative for activity change, appetite change, chills, diaphoresis, fever and unexpected weight change.   HENT: Negative for congestion.    Respiratory: Positive for shortness of breath. Negative for cough, choking, chest tightness, wheezing and stridor.    Cardiovascular: Negative for chest pain and leg swelling.   Gastrointestinal: Negative for abdominal distention.   Genitourinary: Negative for urgency.   Musculoskeletal: Negative for back pain.   Neurological: Negative for dizziness  and numbness.     Objective:     Vital Signs (Most Recent):  BP: 98/60 (03/01/18 1545) Vital Signs (24h Range):  Pulse:  [86] 86  SpO2:  [97 %] 97 %  BP: (82-98)/(57-60) 98/60     Patient Vitals for the past 72 hrs (Last 3 readings):   Weight   03/01/18 1545 120 kg (264 lb 8.8 oz)     Body mass index is 34.9 kg/m².    No intake or output data in the 24 hours ending 03/01/18 1556    Physical Exam   Constitutional: He appears well-developed and well-nourished. No distress.   HENT:   Head: Normocephalic.   Eyes: Pupils are equal, round, and reactive to light.   Neck: Normal range of motion. No JVD present.   Cardiovascular: Normal rate.  Exam reveals no friction rub.    No murmur heard.  Pulmonary/Chest: Effort normal. No respiratory distress. He has no wheezes.   Abdominal: Soft. He exhibits no distension.   Musculoskeletal: Normal range of motion. He exhibits no edema.   Neurological: He is alert.   Skin: Skin is warm. He is not diaphoretic.       Significant Labs:  CBC:  No results for input(s): WBC, RBC, HGB, HCT, PLT, MCV, MCH, MCHC in the last 168 hours.  BNP:    Recent Labs  Lab 03/01/18  0816   *     CMP:    Recent Labs  Lab 02/23/18  1711 03/01/18  0816   GLU 90 94   CALCIUM 9.8 9.8   ALBUMIN  --  3.7   PROT  --  7.8    137   K 4.4 4.5   CO2 26 27    100   BUN 53* 59*   CREATININE 2.6* 2.7*   ALKPHOS  --  104   ALT  --  18   AST  --  22   BILITOT  --  0.5      Coagulation:   No results for input(s): PT, INR, APTT in the last 168 hours.  LDH:  No results for input(s): LDH in the last 72 hours.  Microbiology:  Microbiology Results (last 7 days)     ** No results found for the last 168 hours. **          I have reviewed all pertinent labs within the past 24 hours.    Diagnostic Results:  I have reviewed and interpreted all pertinent imaging results/findings within the past 24 hours.

## 2018-03-01 NOTE — ASSESSMENT & PLAN NOTE
-Cr 2.8 in clinic   - will place central line now and check SVO2 and CVP  - LVAD may not be a viable option with his VT though in his case though this may have been ADHF-related VT and not scar-based. However, we must improve his renal function for that to be an option as well. CO/CI moderately-severely reduced by recent RHC but VT makes inotropes very difficult.   - Will get hemodynamics once central line in and consider IABP to see if kidneys are reversible and discuss LVAD and txp

## 2018-03-01 NOTE — ASSESSMENT & PLAN NOTE
- cr 2.8  - Best course is to admit for accelerated evaluation and consideration for IABP to see if kidneys are reversible and discuss LVAD and txp

## 2018-03-01 NOTE — PROGRESS NOTES
Per order of Dr Rhodes, CPX cancelled for today.  C-scopy on WB cancelled for tomorrow.   Per Dr Rhodes, pt to be admitted for IABP placement and completion of evaluation with likely LVAD implant this admit.  Spoke with sterling Barrera supervisor and admit office to arrange admit to CCU.

## 2018-03-01 NOTE — HPI
Mr. Richards is a 52 y.o. year old Black or  male who has presents as f/u from hospitalization for ADHF after presenting to clinic 1/10/17 as initial consult. advanced surgical options (LVAD/OHT).    This 51 yo BM has had CHF since 2011 at which time an angiogram did not demonstrate any CAD.  He is on amiodarone for VT. He was admitted from 1/12-1/17/18 (see d/c summary) where he was treated for ADHF and initiation of w/u for advanced options. RHC during that admission showed RA 17 and PCWP 35 as well as severely reduced CI 1.6. Though not optimized, he was discharged per his request so as not to lose his job/insurance--see Dr. Hadley's attestation for full details on d/c summary. Since d/c, his Scr has risen from 2.0 on discharge to 2.8 (1/23/18). His BNP had declined to 1040.  He presents today for scheduled hospital f/u.      Today, he reports feeling well. He says he has more energy than usual. His only complaints are cramping and xerosis. He denies n/v/d, no CP, palpitations or syncope. He has stable orthostatic dizziness/LH. No PND or orthopnea. His COLEMAN is improved from discharge and his weight is down about 5-7 pounds on his home scale. Of note, his Scr on labs from 2 days ago showed Scr 2.8 up from 2.0. T. Bili was down from 1.1 to 0.6 and BNP was down to 1040 from 1700.  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

## 2018-03-01 NOTE — PROGRESS NOTES
"Referring Physician:Antonio Hadley Jr*     Reason for visit:  Chief Complaint   Patient presents with    Heart Transplant Evaluation    Nutrition Counseling        :1965     Allergies Reviewed  Meds Reviewed    Anthropometrics  Weight:119.9 kg (264 lb 5.3 oz)  Height: 73"  BMI:Body mass index is 34.87 kg/m².   IBW: 184lbs    Meds:  Outpatient Medications Prior to Visit   Medication Sig Dispense Refill    allopurinol (ZYLOPRIM) 100 MG tablet TAKE 1 TABLET (100 MG TOTAL) BY MOUTH 3 (THREE) TIMES DAILY. 90 tablet 1    amiodarone (PACERONE) 200 MG Tab TAKE 1 TABLET (200 MG TOTAL) BY MOUTH ONCE DAILY. 30 tablet 7    aspirin (ECOTRIN) 81 MG EC tablet Take 81 mg by mouth. 1 Tablet, Delayed Release (E.C.) Oral Every day      bumetanide (BUMEX) 2 MG tablet Take 1 tablet (2 mg total) by mouth 2 (two) times daily. 120 tablet 2    carvedilol (COREG) 3.125 MG tablet TAKE 1 TABLET (3.125 MG TOTAL) BY MOUTH 2 (TWO) TIMES DAILY WITH MEALS.  11    ERGOCALCIFEROL, VITAMIN D2, (VITAMIN D ORAL) Take by mouth once daily.      losartan (COZAAR) 25 MG tablet Take 25 mg by mouth once daily.  1    metOLazone (ZAROXOLYN) 2.5 MG tablet Take 2.5 mg by mouth every other day.  3    metoprolol succinate (TOPROL-XL) 25 MG 24 hr tablet Take 0.5 tablets (12.5 mg total) by mouth once daily. 30 tablet 2    potassium chloride SA (K-DUR,KLOR-CON) 10 MEQ tablet Take 2 tablets (20 mEq total) by mouth 2 (two) times daily. 240 tablet 2    spironolactone (ALDACTONE) 25 MG tablet Take 1 tablet (25 mg total) by mouth once daily. 30 tablet 0    valsartan (DIOVAN) 80 MG tablet Take 0.5 tablets (40 mg total) by mouth 2 (two) times daily. 30 tablet 1    VITAMIN E ACETATE (VITAMIN E ORAL) Take by mouth once daily.       No facility-administered medications prior to visit.          Labs: 3/1/18 K 4.5, alb 3.7, gluc 94, 18 total chol 150, triglycerides 197     Estimated Nutrition Needs: -2490Kcals/day( 25-30kcal/kg IBW),  95g " "protein( 0.8g/kg)     Diet Hx: Pt reports he has been previously educated on low Na diet guidelines and was following closely in the past.  States once he stopped smoking, he began craving and consuming fast foods more.  Pt does not adhere to 1500cal fluid restriction stating "I am always thirsty".   Pt's wife typically prepares meals and grocery shops but pt states she has not been cooking much lately.  Dining out/fast food: approx 3-4 times per week typically [employer], sushi, Mexican, Chinese, or orders from a variety of restaurants via [employer].    Breakfast: banana, 2 halo oranges, 10-20 ounces coffee with sugar and cream or tea with sugar  Mid-morning snack: canned mixed fruit. 16 ounces water  Lunch: [employer] quarter pounder or big mac meal OR fast food pizza OR leftovers from the night before 12 ounces Coke  Afternoon beverages: 24 ounces [place] tea  Dinner: baked chicken OR 2 pieces Popeyes chicken, red beans and rice, biscuit, OR meal from local restaurant, 2-12 ounce Cokes  Evening Snack: my occasionally have Skittles candy or a big bowl of ice cream    Assessment: Pt not adhering to his sodium restriction-dining out often and consuming fast food regularly, daily fluid consumption approx 90 ounces which exceeds 1500ml daily fluid restriction    Nutrition Diagnosis: limited adherence to nutrition recommendations related to pt has been counseled on 2000g Na diet and 1500 fluid restriction on multiple occasions and continues to exceed restrictions as evidenced by pt report and diet recall.    Recommendations:   -Educated pt on Nutrition therapy with heart failure and provided written material on same.  Discussed low Na diet (guidelines, flavoring, label reading etc) and fluid restriction and encouraged adherence.  -Encouraged pt to limit intake of fast food and decrease consumption of liquids.      Consultation Time:30 minutes.          "

## 2018-03-01 NOTE — PROGRESS NOTES
Subjective:   Follow up evaluation of heart transplant candidacy.    HPI:  Mr. Richards is a 52 y.o. year old Black or  male who has presents as f/u from hospitalization for ADHF after presenting to clinic 1/10/17 as initial consult. advanced surgical options (LVAD/OHT).    This 51 yo BM has had CHF since 2011 at which time an angiogram did not demonstrate any CAD.  He is on amiodarone for VT. He was admitted from 1/12-1/17/18 (see d/c summary) where he was treated for ADHF and initiation of w/u for advanced options. RHC during that admission showed RA 17 and PCWP 35 as well as severely reduced CI 1.55. Though not optimized, he was discharged per his request so as not to lose his job/insurance--see Dr. Hadley's attestation for full details on d/c summary.     Patient since his last clinic visit admits to feeling worse, doing worse, less energy and additionally may or may not have had a couple of ICD shocks. He states his quality of life is very poor, gets short of breath walking extremely short distances, and feels fatigued most of the time. NYHA FC IV.     Current Outpatient Prescriptions:     allopurinol (ZYLOPRIM) 100 MG tablet, TAKE 1 TABLET (100 MG TOTAL) BY MOUTH 3 (THREE) TIMES DAILY., Disp: 90 tablet, Rfl: 1    amiodarone (PACERONE) 200 MG Tab, TAKE 1 TABLET (200 MG TOTAL) BY MOUTH ONCE DAILY., Disp: 30 tablet, Rfl: 7    aspirin (ECOTRIN) 81 MG EC tablet, Take 81 mg by mouth. 1 Tablet, Delayed Release (E.C.) Oral Every day, Disp: , Rfl:     bumetanide (BUMEX) 2 MG tablet, Take 1 tablet (2 mg total) by mouth 2 (two) times daily., Disp: 120 tablet, Rfl: 2    ERGOCALCIFEROL, VITAMIN D2, (VITAMIN D ORAL), Take by mouth once daily., Disp: , Rfl:     losartan (COZAAR) 25 MG tablet, Take 25 mg by mouth once daily., Disp: , Rfl: 1    metOLazone (ZAROXOLYN) 2.5 MG tablet, Take 2.5 mg by mouth every other day., Disp: , Rfl: 3    metoprolol succinate (TOPROL-XL) 25 MG 24 hr tablet, Take 0.5  "tablets (12.5 mg total) by mouth once daily., Disp: 30 tablet, Rfl: 2    potassium chloride SA (K-DUR,KLOR-CON) 10 MEQ tablet, Take 2 tablets (20 mEq total) by mouth 2 (two) times daily., Disp: 240 tablet, Rfl: 2    spironolactone (ALDACTONE) 25 MG tablet, Take 1 tablet (25 mg total) by mouth once daily., Disp: 30 tablet, Rfl: 0    valsartan (DIOVAN) 80 MG tablet, Take 0.5 tablets (40 mg total) by mouth 2 (two) times daily., Disp: 30 tablet, Rfl: 1    VITAMIN E ACETATE (VITAMIN E ORAL), Take by mouth once daily., Disp: , Rfl:     Review of Systems   Constitution: Negative for chills, decreased appetite, diaphoresis, fever, weakness, malaise/fatigue, night sweats, weight gain and weight loss.   HENT: Negative for ear discharge, ear pain, hearing loss and hoarse voice.    Eyes: Negative for photophobia and visual disturbance.   Cardiovascular: Positive for dyspnea on exertion. Negative for chest pain, irregular heartbeat, leg swelling, orthopnea, palpitations, paroxysmal nocturnal dyspnea and syncope.   Respiratory: Negative for cough, hemoptysis, shortness of breath, sputum production and wheezing.    Endocrine: Negative for cold intolerance, heat intolerance, polydipsia and polyuria.   Hematologic/Lymphatic: Negative for bleeding problem. Does not bruise/bleed easily.   Musculoskeletal: Negative for arthritis, back pain and joint pain.   Gastrointestinal: Negative for abdominal pain, anorexia, change in bowel habit, dysphagia, heartburn, hematochezia and melena.   Genitourinary: Negative for dysuria, frequency and hematuria.   Neurological: Positive for dizziness. Negative for brief paralysis, focal weakness, headaches, light-headedness and seizures.   Psychiatric/Behavioral: Negative for substance abuse.     Objective:   Blood pressure (!) 82/57, pulse 86, height 6' 1" (1.854 m), weight 120.6 kg (265 lb 14 oz), SpO2 97 %.body mass index is 35.08 kg/m².    Physical Exam   Constitutional: He is oriented to " person, place, and time. He appears well-developed and well-nourished. No distress.   HENT:   Head: Normocephalic and atraumatic.   Eyes: Conjunctivae are normal. Right eye exhibits no discharge. Left eye exhibits no discharge. No scleral icterus.   Neck: No JVD (JVP <6cm) present. No tracheal deviation present. No thyromegaly present.   Cardiovascular: Normal rate and regular rhythm.  Exam reveals no gallop.    Murmur (grade 2/6 systolic murmur LLSB and apex) heard.  Pulmonary/Chest: Effort normal and breath sounds normal.   Abdominal: Soft. Bowel sounds are normal. He exhibits no distension. There is no tenderness. There is no rebound and no guarding.   Musculoskeletal: He exhibits no edema or tenderness.   Neurological: He is alert and oriented to person, place, and time.   Grossly intact   Skin: Skin is warm and dry. He is not diaphoretic.   Psychiatric: He has a normal mood and affect. His behavior is normal. Judgment and thought content normal.     Lab Results   Component Value Date    BNP 2,012 (H) 01/10/2018     01/10/2018    K 3.7 01/10/2018    MG 2.5 12/22/2017    CL 94 (L) 01/10/2018    CO2 33 (H) 01/10/2018    BUN 28 (H) 01/10/2018    CREATININE 2.0 (H) 01/10/2018     01/10/2018    AST 23 01/10/2018    ALT 21 01/10/2018    ALBUMIN 3.6 01/10/2018    PROT 7.6 01/10/2018    BILITOT 1.5 (H) 01/10/2018    WBC 6.75 01/10/2018    HGB 13.9 (L) 01/10/2018    HCT 43.7 01/10/2018     01/10/2018    TSH 4.295 (H) 12/22/2017    CHOL 137 12/23/2017    HDL 45 12/23/2017    LDLCALC 70.4 12/23/2017    TRIG 108 12/23/2017    FREET4 1.19 12/22/2017     ECHO 10/13/2017 CONCLUSIONS     1 - Severely depressed left ventricular systolic function (EF 10-15%).  Severe global hypokinesis, septum appears to contract best.     2 - Eccentric hypertrophy.     3 - Severe left ventricular enlargement.     4 - Restrictive LV filling pattern, indicating markedly elevated LAP (grade 3 diastolic dysfunction).     5 -  Mildly depressed right ventricular systolic function .     6 - Moderate mitral regurgitation.  Eccentric jet may underestimate degree of MR.     7 - Trivial tricuspid regurgitation.     8 - The estimated PA systolic pressure is 35 mmHg.     Assessment:      1. Acute on chronic combined systolic and diastolic heart failure    2. Dilated cardiomyopathy    3. CKD (chronic kidney disease), stage III    4. Hypertensive cardiovascular-renal disease, stage 1-4 or unspecified chronic kidney disease, with heart failure    5. Biventricular implantable cardioverter-defibrillator in situ      Plan:   Acute on chronic systolic heart failure  Patient is finally agreeable after lots of discussion, spent time with him during his clinic visit and after his clinic visit around 1:00-1:30pm, wife came up from work. They are all in agreement it is time to be admitted. Patient will go straight to CCU, see if we can optimize his renal function, complete evaluation, and plan on likely MCS therapy this admission. Dr. Kaufman happened to be around as well, he saw the patient again, had met him in the hospital.     Patient is now NYHA IV ACC stage D    UNOS Patient Status  Functional Status: 50% - Requires considerable assistance and frequent medical care    Physical Capacity: No Limitations  Working for Income: yes  If yes, working activity level: Working Full Time    Amy Rhodes MD

## 2018-03-01 NOTE — H&P
[facility]  Heart Transplant  H&P    Patient Name: Tim Richards  MRN: [id number]  Admission Date: 3/1/2018  Attending Physician: Amparo Lopez MD  Primary Care Provider: Ja Méndez MD  Principal Problem:<principal problem not specified>    Subjective:     History of Present Illness:  Mr. Richards is a 52 y.o. year old Black or  male who has presents as f/u from hospitalization for ADHF after presenting to clinic 1/10/17 as initial consult. advanced surgical options (LVAD/OHT).    This 53 yo BM has had CHF since 2011 at which time an angiogram did not demonstrate any CAD.  He is on amiodarone for VT. He was admitted from 1/12-1/17/18 (see d/c summary) where he was treated for ADHF and initiation of w/u for advanced options. RHC during that admission showed RA 17 and PCWP 35 as well as severely reduced CI 1.6. Though not optimized, he was discharged per his request so as not to lose his job/insurance--see Dr. Hadley's attestation for full details on d/c summary. Since d/c, his Scr has risen from 2.0 on discharge to 2.8 (1/23/18). His BNP had declined to 1040.  He presents today for scheduled hospital f/u.      Today, he reports feeling well. He says he has more energy than usual. His only complaints are cramping and xerosis. He denies n/v/d, no CP, palpitations or syncope. He has stable orthostatic dizziness/LH. No PND or orthopnea. His COLEMAN is improved from discharge and his weight is down about 5-7 pounds on his home scale. Of note, his Scr on labs from 2 days ago showed Scr 2.8 up from 2.0. T. Bili was down from 1.1 to 0.6 and BNP was down to 1040 from 1700.  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 No labs initially ordered but I ordered and sent him down.     Past Medical History:   Diagnosis Date    CHF (congestive heart failure)     Dilated cardiomyopathy 1/10/2018    Disorder of kidney and ureter     CKD    Gout     HTN  (hypertension)     Ventricular tachycardia (paroxysmal)        Past Surgical History:   Procedure Laterality Date    CARDIAC CATHETERIZATION  Dec. 2012    CARDIAC DEFIBRILLATOR PLACEMENT Left     CRRT-D       Review of patient's allergies indicates:   Allergen Reactions    Lisinopril Anaphylaxis       Current Facility-Administered Medications   Medication    allopurinol tablet 100 mg    [START ON 3/2/2018] amiodarone tablet 200 mg    [START ON 3/2/2018] aspirin EC tablet 81 mg    sodium chloride 0.9% flush 3 mL     Family History     Problem Relation (Age of Onset)    Cancer Sister (54)    Coronary artery disease Father    Diabetes Father    Heart disease Father    Hypertension Father    No Known Problems Mother, Brother        Social History Main Topics    Smoking status: Former Smoker     Packs/day: 1.00     Years: 31.00     Types: Cigarettes     Quit date: 1/12/2018    Smokeless tobacco: Never Used      Comment: pt is quiting on his own - pt stated not qualified for program; 2/16 pt  quit on his own    Alcohol use No      Comment: one fifth of gin or rum/week    Drug use: No    Sexual activity: Yes     Partners: Female     Birth control/ protection: None      Comment: 10/5/17  with same partner 7 years      Review of Systems   Constitutional: Negative for activity change, appetite change, chills, diaphoresis, fever and unexpected weight change.   HENT: Negative for congestion.    Respiratory: Positive for shortness of breath. Negative for cough, choking, chest tightness, wheezing and stridor.    Cardiovascular: Negative for chest pain and leg swelling.   Gastrointestinal: Negative for abdominal distention.   Genitourinary: Negative for urgency.   Musculoskeletal: Negative for back pain.   Neurological: Negative for dizziness and numbness.     Objective:     Vital Signs (Most Recent):  BP: 98/60 (03/01/18 1545) Vital Signs (24h Range):  Pulse:  [86] 86  SpO2:  [97 %] 97 %  BP: (82-98)/(57-60)  98/60     Patient Vitals for the past 72 hrs (Last 3 readings):   Weight   03/01/18 1545 120 kg (264 lb 8.8 oz)     Body mass index is 34.9 kg/m².    No intake or output data in the 24 hours ending 03/01/18 1556    Physical Exam   Constitutional: He appears well-developed and well-nourished. No distress.   HENT:   Head: Normocephalic.   Eyes: Pupils are equal, round, and reactive to light.   Neck: Normal range of motion. No JVD present.   Cardiovascular: Normal rate.  Exam reveals no friction rub.    No murmur heard.  Pulmonary/Chest: Effort normal. No respiratory distress. He has no wheezes.   Abdominal: Soft. He exhibits no distension.   Musculoskeletal: Normal range of motion. He exhibits no edema.   Neurological: He is alert.   Skin: Skin is warm. He is not diaphoretic.       Significant Labs:  CBC:  No results for input(s): WBC, RBC, HGB, HCT, PLT, MCV, MCH, MCHC in the last 168 hours.  BNP:    Recent Labs  Lab 03/01/18  0816   *     CMP:    Recent Labs  Lab 02/23/18  1711 03/01/18  0816   GLU 90 94   CALCIUM 9.8 9.8   ALBUMIN  --  3.7   PROT  --  7.8    137   K 4.4 4.5   CO2 26 27    100   BUN 53* 59*   CREATININE 2.6* 2.7*   ALKPHOS  --  104   ALT  --  18   AST  --  22   BILITOT  --  0.5      Coagulation:   No results for input(s): PT, INR, APTT in the last 168 hours.  LDH:  No results for input(s): LDH in the last 72 hours.  Microbiology:  Microbiology Results (last 7 days)     ** No results found for the last 168 hours. **          I have reviewed all pertinent labs within the past 24 hours.    Diagnostic Results:  I have reviewed and interpreted all pertinent imaging results/findings within the past 24 hours.    Assessment/Plan:     CKD (chronic kidney disease), stage III    - cr 2.8  - Best course is to admit for accelerated evaluation and consideration for IABP to see if kidneys are reversible and discuss LVAD and txp        PVT (paroxysmal ventricular tachycardia)    -will hold beta  blocker for now given hypotension and possible need for  gtt        Acute on chronic combined systolic and diastolic heart failure    -Cr 2.8 in clinic   - will place central line now and check SVO2 and CVP  - LVAD may not be a viable option with his VT though in his case though this may have been ADHF-related VT and not scar-based. However, we must improve his renal function for that to be an option as well. CO/CI moderately-severely reduced by recent RHC but VT makes inotropes very difficult.   - Will get hemodynamics once central line in and consider IABP to see if kidneys are reversible and discuss LVAD and txp        Biventricular implantable cardioverter-defibrillator in situ    - in place            YANET Harris  Heart Transplant  [facility]

## 2018-03-01 NOTE — PROCEDURES
"Tim Richards is a 52 y.o. male patient.    Temp: 98.1 °F (36.7 °C) (03/01/18 1600)  Pulse: 71 (03/01/18 1700)  Resp: (!) 24 (03/01/18 1700)  BP: (!) 85/60 (03/01/18 1700)  SpO2: 96 % (03/01/18 1700)  Weight: 120 kg (264 lb 8.8 oz) (03/01/18 1545)  Height: 6' 1" (185.4 cm) (03/01/18 1545)    Central Line  Date/Time: 3/1/2018 5:51 PM  Location procedure was performed: [facility] CARDIAC MEDICAL ICU (CMICU)  Performed by: JAZMINE VASQUEZ  Pre-operative Diagnosis: CHF  Post-operative diagnosis: CHF  Consent Done: Yes  Time out: Immediately prior to procedure a "time out" was called to verify the correct patient, procedure, equipment, support staff and site/side marked as required.  Indications: med administration  Anesthesia: local infiltration    Anesthesia:  Local Anesthetic: lidocaine 1% without epinephrine  Anesthetic total: 5 mL  Preparation: skin prepped with ChloraPrep  Skin prep agent dried: skin prep agent completely dried prior to procedure  Sterile barriers: all five maximum sterile barriers used - cap, mask, sterile gown, sterile gloves, and large sterile sheet  Hand hygiene: hand hygiene performed prior to central venous catheter insertion  Location details: right internal jugular  Catheter type: triple lumen  Catheter size: 6 Fr  Ultrasound guidance: yes  Vessel Caliber: large, patent, compressibility normal  Needle advanced into vessel with real time Ultrasound guidance.  Guidewire confirmed in vessel.  Sterile sheath used.  Manometry: Yes  Number of attempts: 1  Assessment: placement verified by x-ray  Complications: none  Estimated blood loss (mL): 5  Specimens: No  Implants: No  Post-procedure: line sutured,  chlorhexidine patch,  sterile dressing applied and blood return through all ports  Complications: No          No flowsheet data found.    Jazmine Vasquez  3/1/2018  "

## 2018-03-02 LAB
ALBUMIN SERPL BCP-MCNC: 3.8 G/DL
ALBUMIN SERPL BCP-MCNC: 3.9 G/DL
ALLENS TEST: ABNORMAL
ALLENS TEST: ABNORMAL
ALP SERPL-CCNC: 95 U/L
ALP SERPL-CCNC: 98 U/L
ALT SERPL W/O P-5'-P-CCNC: 19 U/L
ALT SERPL W/O P-5'-P-CCNC: 19 U/L
ANION GAP SERPL CALC-SCNC: 11 MMOL/L
ANION GAP SERPL CALC-SCNC: 12 MMOL/L
AST SERPL-CCNC: 23 U/L
AST SERPL-CCNC: 24 U/L
BASOPHILS # BLD AUTO: 0.01 K/UL
BASOPHILS # BLD AUTO: 0.01 K/UL
BASOPHILS NFR BLD: 0.2 %
BASOPHILS NFR BLD: 0.2 %
BILIRUB SERPL-MCNC: 0.7 MG/DL
BILIRUB SERPL-MCNC: 0.8 MG/DL
BUN SERPL-MCNC: 48 MG/DL
BUN SERPL-MCNC: 53 MG/DL
CALCIUM SERPL-MCNC: 10 MG/DL
CALCIUM SERPL-MCNC: 9.7 MG/DL
CHLORIDE SERPL-SCNC: 98 MMOL/L
CHLORIDE SERPL-SCNC: 99 MMOL/L
CO2 SERPL-SCNC: 27 MMOL/L
CO2 SERPL-SCNC: 28 MMOL/L
CREAT SERPL-MCNC: 2.2 MG/DL
CREAT SERPL-MCNC: 2.3 MG/DL
DELSYS: ABNORMAL
DELSYS: ABNORMAL
DIFFERENTIAL METHOD: ABNORMAL
DIFFERENTIAL METHOD: ABNORMAL
EOSINOPHIL # BLD AUTO: 0.1 K/UL
EOSINOPHIL # BLD AUTO: 0.1 K/UL
EOSINOPHIL NFR BLD: 1.9 %
EOSINOPHIL NFR BLD: 1.9 %
ERYTHROCYTE [DISTWIDTH] IN BLOOD BY AUTOMATED COUNT: 15.3 %
ERYTHROCYTE [DISTWIDTH] IN BLOOD BY AUTOMATED COUNT: 15.3 %
ERYTHROCYTE [SEDIMENTATION RATE] IN BLOOD BY WESTERGREN METHOD: 14 MM/H
ERYTHROCYTE [SEDIMENTATION RATE] IN BLOOD BY WESTERGREN METHOD: 16 MM/H
EST. GFR  (AFRICAN AMERICAN): 36.6 ML/MIN/1.73 M^2
EST. GFR  (AFRICAN AMERICAN): 38.7 ML/MIN/1.73 M^2
EST. GFR  (NON AFRICAN AMERICAN): 31.7 ML/MIN/1.73 M^2
EST. GFR  (NON AFRICAN AMERICAN): 33.4 ML/MIN/1.73 M^2
FIO2: 21
FIO2: 21
GLUCOSE SERPL-MCNC: 76 MG/DL
GLUCOSE SERPL-MCNC: 90 MG/DL
HCO3 UR-SCNC: 28 MMOL/L (ref 24–28)
HCO3 UR-SCNC: 30.4 MMOL/L (ref 24–28)
HCT VFR BLD AUTO: 39.6 %
HCT VFR BLD AUTO: 39.6 %
HGB BLD-MCNC: 12.6 G/DL
HGB BLD-MCNC: 12.6 G/DL
IMM GRANULOCYTES # BLD AUTO: 0.01 K/UL
IMM GRANULOCYTES # BLD AUTO: 0.01 K/UL
IMM GRANULOCYTES NFR BLD AUTO: 0.2 %
IMM GRANULOCYTES NFR BLD AUTO: 0.2 %
LYMPHOCYTES # BLD AUTO: 1.3 K/UL
LYMPHOCYTES # BLD AUTO: 1.3 K/UL
LYMPHOCYTES NFR BLD: 24.6 %
LYMPHOCYTES NFR BLD: 24.6 %
MAGNESIUM SERPL-MCNC: 2.1 MG/DL
MCH RBC QN AUTO: 26.7 PG
MCH RBC QN AUTO: 26.7 PG
MCHC RBC AUTO-ENTMCNC: 31.8 G/DL
MCHC RBC AUTO-ENTMCNC: 31.8 G/DL
MCV RBC AUTO: 84 FL
MCV RBC AUTO: 84 FL
MODE: ABNORMAL
MODE: ABNORMAL
MONOCYTES # BLD AUTO: 0.6 K/UL
MONOCYTES # BLD AUTO: 0.6 K/UL
MONOCYTES NFR BLD: 11.3 %
MONOCYTES NFR BLD: 11.3 %
NEUTROPHILS # BLD AUTO: 3.2 K/UL
NEUTROPHILS # BLD AUTO: 3.2 K/UL
NEUTROPHILS NFR BLD: 61.8 %
NEUTROPHILS NFR BLD: 61.8 %
NRBC BLD-RTO: 0 /100 WBC
NRBC BLD-RTO: 0 /100 WBC
PCO2 BLDA: 47.7 MMHG (ref 35–45)
PCO2 BLDA: 48.2 MMHG (ref 35–45)
PH SMN: 7.38 [PH] (ref 7.35–7.45)
PH SMN: 7.41 [PH] (ref 7.35–7.45)
PLATELET # BLD AUTO: 209 K/UL
PLATELET # BLD AUTO: 209 K/UL
PMV BLD AUTO: 11.2 FL
PMV BLD AUTO: 11.2 FL
PO2 BLDA: 29 MMHG (ref 40–60)
PO2 BLDA: 31 MMHG (ref 40–60)
POC BE: 3 MMOL/L
POC BE: 6 MMOL/L
POC SATURATED O2: 52 % (ref 95–100)
POC SATURATED O2: 60 % (ref 95–100)
POC TCO2: 29 MMOL/L (ref 24–29)
POC TCO2: 32 MMOL/L (ref 24–29)
POTASSIUM SERPL-SCNC: 3.1 MMOL/L
POTASSIUM SERPL-SCNC: 3.9 MMOL/L
PROT SERPL-MCNC: 7.8 G/DL
PROT SERPL-MCNC: 7.8 G/DL
RBC # BLD AUTO: 4.72 M/UL
RBC # BLD AUTO: 4.72 M/UL
SAMPLE: ABNORMAL
SAMPLE: ABNORMAL
SITE: ABNORMAL
SITE: ABNORMAL
SODIUM SERPL-SCNC: 137 MMOL/L
SODIUM SERPL-SCNC: 138 MMOL/L
SP02: 96
SP02: 99
WBC # BLD AUTO: 5.21 K/UL
WBC # BLD AUTO: 5.21 K/UL

## 2018-03-02 PROCEDURE — 83735 ASSAY OF MAGNESIUM: CPT | Mod: NTX

## 2018-03-02 PROCEDURE — A4216 STERILE WATER/SALINE, 10 ML: HCPCS | Mod: NTX | Performed by: PHYSICIAN ASSISTANT

## 2018-03-02 PROCEDURE — 25000003 PHARM REV CODE 250: Mod: NTX | Performed by: INTERNAL MEDICINE

## 2018-03-02 PROCEDURE — 80053 COMPREHEN METABOLIC PANEL: CPT | Mod: NTX

## 2018-03-02 PROCEDURE — 20000000 HC ICU ROOM: Mod: NTX

## 2018-03-02 PROCEDURE — 25000003 PHARM REV CODE 250: Mod: NTX | Performed by: STUDENT IN AN ORGANIZED HEALTH CARE EDUCATION/TRAINING PROGRAM

## 2018-03-02 PROCEDURE — 99223 1ST HOSP IP/OBS HIGH 75: CPT | Mod: AI,NTX,, | Performed by: INTERNAL MEDICINE

## 2018-03-02 PROCEDURE — 80053 COMPREHEN METABOLIC PANEL: CPT | Mod: 91,NTX

## 2018-03-02 PROCEDURE — 63600175 PHARM REV CODE 636 W HCPCS: Mod: NTX | Performed by: STUDENT IN AN ORGANIZED HEALTH CARE EDUCATION/TRAINING PROGRAM

## 2018-03-02 PROCEDURE — 25000003 PHARM REV CODE 250: Mod: NTX | Performed by: PHYSICIAN ASSISTANT

## 2018-03-02 PROCEDURE — 63600175 PHARM REV CODE 636 W HCPCS: Mod: NTX | Performed by: PHYSICIAN ASSISTANT

## 2018-03-02 PROCEDURE — 85025 COMPLETE CBC W/AUTO DIFF WBC: CPT | Mod: NTX

## 2018-03-02 RX ORDER — TRAMADOL HYDROCHLORIDE 50 MG/1
50 TABLET ORAL EVERY 6 HOURS PRN
Status: DISCONTINUED | OUTPATIENT
Start: 2018-03-02 | End: 2018-03-08

## 2018-03-02 RX ORDER — POTASSIUM CHLORIDE 20 MEQ/1
20 TABLET, EXTENDED RELEASE ORAL ONCE
Status: COMPLETED | OUTPATIENT
Start: 2018-03-02 | End: 2018-03-02

## 2018-03-02 RX ORDER — POTASSIUM CHLORIDE 20 MEQ/1
40 TABLET, EXTENDED RELEASE ORAL ONCE
Status: COMPLETED | OUTPATIENT
Start: 2018-03-02 | End: 2018-03-02

## 2018-03-02 RX ORDER — POTASSIUM CHLORIDE 20 MEQ/1
60 TABLET, EXTENDED RELEASE ORAL ONCE
Status: COMPLETED | OUTPATIENT
Start: 2018-03-02 | End: 2018-03-02

## 2018-03-02 RX ADMIN — ALLOPURINOL 100 MG: 100 TABLET ORAL at 08:03

## 2018-03-02 RX ADMIN — ALLOPURINOL 100 MG: 100 TABLET ORAL at 02:03

## 2018-03-02 RX ADMIN — POTASSIUM CHLORIDE 60 MEQ: 1500 TABLET, EXTENDED RELEASE ORAL at 04:03

## 2018-03-02 RX ADMIN — DOBUTAMINE IN DEXTROSE 5 MCG/KG/MIN: 200 INJECTION, SOLUTION INTRAVENOUS at 06:03

## 2018-03-02 RX ADMIN — ASPIRIN 81 MG: 81 TABLET, COATED ORAL at 08:03

## 2018-03-02 RX ADMIN — POTASSIUM CHLORIDE 20 MEQ: 1500 TABLET, EXTENDED RELEASE ORAL at 05:03

## 2018-03-02 RX ADMIN — Medication 3 ML: at 05:03

## 2018-03-02 RX ADMIN — DOBUTAMINE IN DEXTROSE 5 MCG/KG/MIN: 200 INJECTION, SOLUTION INTRAVENOUS at 08:03

## 2018-03-02 RX ADMIN — FUROSEMIDE 5 MG/HR: 10 INJECTION, SOLUTION INTRAVENOUS at 09:03

## 2018-03-02 RX ADMIN — TRAMADOL HYDROCHLORIDE 50 MG: 50 TABLET, FILM COATED ORAL at 08:03

## 2018-03-02 RX ADMIN — AMIODARONE HYDROCHLORIDE 200 MG: 200 TABLET ORAL at 08:03

## 2018-03-02 RX ADMIN — Medication 3 ML: at 10:03

## 2018-03-02 RX ADMIN — POTASSIUM CHLORIDE 40 MEQ: 1500 TABLET, EXTENDED RELEASE ORAL at 06:03

## 2018-03-02 NOTE — PLAN OF CARE
Problem: Patient Care Overview  Goal: Plan of Care Review  Outcome: Ongoing (interventions implemented as appropriate)  Vitals stable, no acute issues this shift.  Paced rhythm on tele.   @ 5, lasix @ 5.  Diuresing well.  CVP 4-6.  Cr trended down today.  Monitoring lytes.  Up ad bibiana in room. Wife at bedside, plan of care reviewed.

## 2018-03-02 NOTE — PROGRESS NOTES
Fellow Dr Cuadra notified of SvO2 56, CVP 16, and SBP 80s-90s, MAP 60s.  At bedside reviewing chart now.

## 2018-03-02 NOTE — PROGRESS NOTES
"Admit Note     Met with patient to assess needs. Patient is a 52 y.o.  male, admitted for Acute decompensated heart failure [I50.9] per medical record.      Patient admitted from clinic visit on 3/1/2018.  At this time, patient presents as alert and oriented x 4, pleasant, calm, cooperative and asking and answering questions appropriately.  At this time, patients caregiver is not present.    Household/Family Systems     Patient resides with patient's wife, step-son (29 y/o), and three grandsons (ages 10, 11, and 12), at    [address]  [place] [address]     Pt cell: [phone]  Kelly Richards (wife): [phone]    Support system includes wife and adult step-son.  Patient does not have dependents that are need of being cared for.  Pt reports his step-son and grandsons are staying with pt and wife temporarily.     Patients primary caregiver is wife and self.  During admission, patient's caregiver plans to visit.  Confirmed patient and patients caregivers do have access to reliable transportation.    Cognitive Status/Learning     Patient reports reading ability as college and states patient does not have difficulty with reading, writing, hearing, comprehension and learning.  Patient reports he wears glasses.  Patient reports "small" memory issues.  Patient reports patient learns best by reading.   Needed: No.   Highest education level: Attended College/Technical School    Vocation/Disability     Working for Income: yes  If yes, working activity level: Working Full Time-Pt reports just started STD this week.  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    Adherence     Patient reports a high level of adherence to patients health care regimen.  Adherence counseling and education provided. Patient verbalizes understanding.    Substance Use    Patient reports the following substance usage.    Tobacco: Pt reports he started smoking at age 17.  Pt reports he used to smoke 1/2 " ppd but was down to 1-2 cigarettes/day recently.  Pt reports quit smoking on 1/10/18.  Pt reports has not smoked since that date.  Alcohol: Patient reports social alcohol consumption in the past. Pt reports he usually drank about 3x/wk.  Pt states he sometimes would have a drink with dinner.  Pt states when he drank on weekends or socially (i.e. football game), he drank up to 6 beers. No use reported since 2018.  Illicit Drugs/Non-prescribed Medications: none, patient denies any use.    Patient states clear understanding of the potential impact of substance use.  Substance abstinence/cessation counseling, education and resources provided and reviewed.     Services Utilizing/ADLS    Infusion Service: Prior to admission, patient utilizing? no  Home Health: Prior to admission, patient utilizing? no  DME: Prior to admission, pt reports he sometimes uses a cane when he has a Gout flare  Pulmonary/Cardiac Rehab: Prior to admission, no  Dialysis:  Prior to admission, no  Transplant Specialty Pharmacy:  Prior to admission, n/a    Prior to admission, patient reports patient was independent with ADLS and was driving.  Patient reports patient is able to care for self at this time.  Patient indicates a willingness to care for self once medically cleared to do so.    Insurance/Medications    Insured by   Payor/Plan Subscr  Sex Relation Sub. Ins. ID Effective Group Num   1. AETNA - AETNA* JESUS PHELPS* 1965 Male  [id number]                                   [address]   2. [place] - [place]* JESUS PHELPS* 1965 Male  [id number] 12/1/15 [facility] [address] [address]      Primary Insurance (for UNOS reporting): Private Insurance  Secondary Insurance (for UNOS reporting): Public Insurance - Medicaid    Patient reports patient is able to obtain and afford medications at this time and at time of discharge.    Living Will/Healthcare Power of     Patient  states patient does not have a LW and/or HCPA.   provided education regarding LW and HCPA and the completion of forms.    Coping/Mental Health    Patient is coping adequately with the aid of  family members.  Patient denies mental health difficulties.     Discharge Planning    At time of discharge, patient plans to return to patient's home under the care of self and wife. Patients wife will transport patient. Per rounds today, expected discharge date has not been medically determined at this time. Pt states he expects to be here at least one month. Patient verbalizes understanding and is involved in treatment planning and discharge process.    Additional Concerns    Patient verbalizes understanding and agreement with information reviewed,  availability, and how to access available resources as needed. Patient denies additional needs or concerns at this time. Patient is being followed for needs, education, resources, information, emotional support, supportive counseling, and for supportive and skilled discharge plan of care.  providing ongoing psychosocial support, education, resources and d/c planning as needed.  SW remains available.

## 2018-03-02 NOTE — PROGRESS NOTES
Repeat SVO2 increased from 52 to 60 and CVP decreased from 16 to 7. On Dobutamine 5mcg/kg/min and lasix gtt 10mg/hr.    Will continue current plan of care

## 2018-03-02 NOTE — PLAN OF CARE
Problem: Patient Care Overview  Goal: Plan of Care Review  Outcome: Ongoing (interventions implemented as appropriate)  VSS within the shift. Patient is conscious and coherent. On paced rhythm. No hypotension noted. Dobutamine titrated up from 2.5 to 5. Order given after SVO2 was relayed. Recheck was done and 60 was obtained. Dr Cuadra was made aware. Lasix drip established and continued at 10. CVP's were 6-7. Patient tolerates room air. No desaturations noted. No signs of aspiration observed during meals. Patient voids freely with significant amount of output per void. Electrolyte levels assessed upon verbalization of lower extremity discomfort. Replacement facilitated as ordered. POC discussed with patient. Plan to insert VAD still needs to be finalized. Will continue to monitor.

## 2018-03-02 NOTE — PROGRESS NOTES
Met with patient today. Patient given copies of VAD paperwork.  All question answered with verbalization of understanding. Will continue to monitor patients education status.

## 2018-03-03 ENCOUNTER — DOCUMENTATION ONLY (OUTPATIENT)
Dept: INFECTIOUS DISEASES | Facility: HOSPITAL | Age: 53
End: 2018-03-03

## 2018-03-03 LAB
ALBUMIN SERPL BCP-MCNC: 3.6 G/DL
ALBUMIN SERPL BCP-MCNC: 3.6 G/DL
ALLENS TEST: ABNORMAL
ALP SERPL-CCNC: 100 U/L
ALP SERPL-CCNC: 102 U/L
ALT SERPL W/O P-5'-P-CCNC: 19 U/L
ALT SERPL W/O P-5'-P-CCNC: 20 U/L
ANION GAP SERPL CALC-SCNC: 11 MMOL/L
ANION GAP SERPL CALC-SCNC: 13 MMOL/L
AST SERPL-CCNC: 23 U/L
AST SERPL-CCNC: 24 U/L
BASOPHILS # BLD AUTO: 0.02 K/UL
BASOPHILS # BLD AUTO: 0.02 K/UL
BASOPHILS NFR BLD: 0.3 %
BASOPHILS NFR BLD: 0.3 %
BILIRUB SERPL-MCNC: 0.7 MG/DL
BILIRUB SERPL-MCNC: 0.8 MG/DL
BUN SERPL-MCNC: 32 MG/DL
BUN SERPL-MCNC: 40 MG/DL
CALCIUM SERPL-MCNC: 10 MG/DL
CALCIUM SERPL-MCNC: 9.9 MG/DL
CHLORIDE SERPL-SCNC: 97 MMOL/L
CHLORIDE SERPL-SCNC: 98 MMOL/L
CO2 SERPL-SCNC: 26 MMOL/L
CO2 SERPL-SCNC: 28 MMOL/L
CREAT SERPL-MCNC: 1.9 MG/DL
CREAT SERPL-MCNC: 2 MG/DL
DIFFERENTIAL METHOD: ABNORMAL
DIFFERENTIAL METHOD: ABNORMAL
EOSINOPHIL # BLD AUTO: 0.1 K/UL
EOSINOPHIL # BLD AUTO: 0.1 K/UL
EOSINOPHIL NFR BLD: 2 %
EOSINOPHIL NFR BLD: 2 %
ERYTHROCYTE [DISTWIDTH] IN BLOOD BY AUTOMATED COUNT: 15.6 %
ERYTHROCYTE [DISTWIDTH] IN BLOOD BY AUTOMATED COUNT: 15.6 %
EST. GFR  (AFRICAN AMERICAN): 43.4 ML/MIN/1.73 M^2
EST. GFR  (AFRICAN AMERICAN): 46.2 ML/MIN/1.73 M^2
EST. GFR  (NON AFRICAN AMERICAN): 37.5 ML/MIN/1.73 M^2
EST. GFR  (NON AFRICAN AMERICAN): 39.9 ML/MIN/1.73 M^2
GLUCOSE SERPL-MCNC: 101 MG/DL
GLUCOSE SERPL-MCNC: 99 MG/DL
HCO3 UR-SCNC: 31.2 MMOL/L (ref 24–28)
HCT VFR BLD AUTO: 40.3 %
HCT VFR BLD AUTO: 40.3 %
HGB BLD-MCNC: 13.1 G/DL
HGB BLD-MCNC: 13.1 G/DL
IMM GRANULOCYTES # BLD AUTO: 0.03 K/UL
IMM GRANULOCYTES # BLD AUTO: 0.03 K/UL
IMM GRANULOCYTES NFR BLD AUTO: 0.5 %
IMM GRANULOCYTES NFR BLD AUTO: 0.5 %
LYMPHOCYTES # BLD AUTO: 1.2 K/UL
LYMPHOCYTES # BLD AUTO: 1.2 K/UL
LYMPHOCYTES NFR BLD: 20.7 %
LYMPHOCYTES NFR BLD: 20.7 %
MCH RBC QN AUTO: 27.3 PG
MCH RBC QN AUTO: 27.3 PG
MCHC RBC AUTO-ENTMCNC: 32.5 G/DL
MCHC RBC AUTO-ENTMCNC: 32.5 G/DL
MCV RBC AUTO: 84 FL
MCV RBC AUTO: 84 FL
MONOCYTES # BLD AUTO: 0.6 K/UL
MONOCYTES # BLD AUTO: 0.6 K/UL
MONOCYTES NFR BLD: 10.5 %
MONOCYTES NFR BLD: 10.5 %
NEUTROPHILS # BLD AUTO: 4 K/UL
NEUTROPHILS # BLD AUTO: 4 K/UL
NEUTROPHILS NFR BLD: 66 %
NEUTROPHILS NFR BLD: 66 %
NRBC BLD-RTO: 0 /100 WBC
NRBC BLD-RTO: 0 /100 WBC
PCO2 BLDA: 53 MMHG (ref 35–45)
PH SMN: 7.38 [PH] (ref 7.35–7.45)
PLATELET # BLD AUTO: 212 K/UL
PLATELET # BLD AUTO: 212 K/UL
PMV BLD AUTO: 11 FL
PMV BLD AUTO: 11 FL
PO2 BLDA: 28 MMHG (ref 40–60)
POC BE: 6 MMOL/L
POC SATURATED O2: 51 % (ref 95–100)
POC TCO2: 33 MMOL/L (ref 24–29)
POTASSIUM SERPL-SCNC: 3.5 MMOL/L
POTASSIUM SERPL-SCNC: 4.2 MMOL/L
PROT SERPL-MCNC: 7.8 G/DL
PROT SERPL-MCNC: 7.8 G/DL
RBC # BLD AUTO: 4.8 M/UL
RBC # BLD AUTO: 4.8 M/UL
SAMPLE: ABNORMAL
SITE: ABNORMAL
SODIUM SERPL-SCNC: 136 MMOL/L
SODIUM SERPL-SCNC: 137 MMOL/L
WBC # BLD AUTO: 6 K/UL
WBC # BLD AUTO: 6 K/UL

## 2018-03-03 PROCEDURE — 20600001 HC STEP DOWN PRIVATE ROOM: Mod: NTX

## 2018-03-03 PROCEDURE — A4216 STERILE WATER/SALINE, 10 ML: HCPCS | Mod: NTX | Performed by: PHYSICIAN ASSISTANT

## 2018-03-03 PROCEDURE — 25000003 PHARM REV CODE 250: Mod: NTX | Performed by: STUDENT IN AN ORGANIZED HEALTH CARE EDUCATION/TRAINING PROGRAM

## 2018-03-03 PROCEDURE — 25000003 PHARM REV CODE 250: Mod: NTX | Performed by: PHYSICIAN ASSISTANT

## 2018-03-03 PROCEDURE — 99900035 HC TECH TIME PER 15 MIN (STAT): Mod: NTX

## 2018-03-03 PROCEDURE — 99232 SBSQ HOSP IP/OBS MODERATE 35: CPT | Mod: NTX,,, | Performed by: INTERNAL MEDICINE

## 2018-03-03 PROCEDURE — 94761 N-INVAS EAR/PLS OXIMETRY MLT: CPT | Mod: NTX

## 2018-03-03 PROCEDURE — 80053 COMPREHEN METABOLIC PANEL: CPT | Mod: 91,NTX

## 2018-03-03 PROCEDURE — 25000003 PHARM REV CODE 250: Mod: NTX | Performed by: INTERNAL MEDICINE

## 2018-03-03 PROCEDURE — 85025 COMPLETE CBC W/AUTO DIFF WBC: CPT | Mod: NTX

## 2018-03-03 PROCEDURE — 82803 BLOOD GASES ANY COMBINATION: CPT | Mod: NTX

## 2018-03-03 RX ORDER — POTASSIUM CHLORIDE 20 MEQ/1
40 TABLET, EXTENDED RELEASE ORAL ONCE
Status: COMPLETED | OUTPATIENT
Start: 2018-03-03 | End: 2018-03-03

## 2018-03-03 RX ORDER — DOCUSATE SODIUM 100 MG/1
100 CAPSULE, LIQUID FILLED ORAL 3 TIMES DAILY
Status: DISCONTINUED | OUTPATIENT
Start: 2018-03-03 | End: 2018-03-08

## 2018-03-03 RX ADMIN — DOCUSATE SODIUM 100 MG: 100 CAPSULE, LIQUID FILLED ORAL at 03:03

## 2018-03-03 RX ADMIN — ASPIRIN 81 MG: 81 TABLET, COATED ORAL at 08:03

## 2018-03-03 RX ADMIN — Medication 3 ML: at 05:03

## 2018-03-03 RX ADMIN — AMIODARONE HYDROCHLORIDE 200 MG: 200 TABLET ORAL at 08:03

## 2018-03-03 RX ADMIN — ALLOPURINOL 100 MG: 100 TABLET ORAL at 08:03

## 2018-03-03 RX ADMIN — ALLOPURINOL 100 MG: 100 TABLET ORAL at 02:03

## 2018-03-03 RX ADMIN — DOCUSATE SODIUM 100 MG: 100 CAPSULE, LIQUID FILLED ORAL at 08:03

## 2018-03-03 RX ADMIN — POTASSIUM CHLORIDE 40 MEQ: 1500 TABLET, EXTENDED RELEASE ORAL at 08:03

## 2018-03-03 RX ADMIN — Medication 3 ML: at 02:03

## 2018-03-03 NOTE — PLAN OF CARE
Problem: Patient Care Overview  Goal: Plan of Care Review  Outcome: Ongoing (interventions implemented as appropriate)  VSS within the shift. Patient is conscious and coherent. No pain concerns. On paced rhythm. Dobutamine and lasix drip continued. CVP noted 5. Patient tolerates room air. No desaturations noted. No BM. Voiding freely with significant amount of output per void. POC discussed with patient. Will continue to monitor.

## 2018-03-03 NOTE — NURSING TRANSFER
[facility] Pt transported via w/c. Tele monitor resumed. Dobutamine and Lasix gtts noted. VSS. No acute distress noted. Pt w/o any complaints. Pt oriented to room and environment. Bed low and locked. Call light within reach. Will continue to monitor.

## 2018-03-03 NOTE — SUBJECTIVE & OBJECTIVE
Interval History: cvp 4, breathing improved    Continuous Infusions:   DOBUTamine 5 mcg/kg/min (03/03/18 1000)    furosemide (LASIX) 1 mg/mL infusion (non-titrating) 5 mg/hr (03/03/18 1000)     Scheduled Meds:   allopurinol  100 mg Oral TID    amiodarone  200 mg Oral Daily    aspirin  81 mg Oral Daily    sodium chloride 0.9%  3 mL Intravenous Q8H     PRN Meds:traMADol    Review of patient's allergies indicates:   Allergen Reactions    Lisinopril Anaphylaxis     Objective:     Vital Signs (Most Recent):  Temp: 98.1 °F (36.7 °C) (03/03/18 0700)  Pulse: 86 (03/03/18 1000)  Resp: 17 (03/03/18 1000)  BP: 106/63 (03/03/18 1000)  SpO2: 96 % (03/03/18 1000) Vital Signs (24h Range):  Temp:  [97.8 °F (36.6 °C)-98.7 °F (37.1 °C)] 98.1 °F (36.7 °C)  Pulse:  [74-88] 86  Resp:  [16-28] 17  SpO2:  [95 %-100 %] 96 %  BP: ()/(56-73) 106/63     Patient Vitals for the past 72 hrs (Last 3 readings):   Weight   03/02/18 1000 116.5 kg (256 lb 13.4 oz)   03/01/18 1545 120 kg (264 lb 8.8 oz)     Body mass index is 33.89 kg/m².      Intake/Output Summary (Last 24 hours) at 03/03/18 1234  Last data filed at 03/03/18 1000   Gross per 24 hour   Intake             1186 ml   Output             3820 ml   Net            -2634 ml       Hemodynamic Parameters:         Physical Exam   Constitutional: He appears well-developed and well-nourished. No distress.   HENT:   Head: Normocephalic.   Eyes: Pupils are equal, round, and reactive to light.   Neck: Normal range of motion. No JVD present.   Cardiovascular: Normal rate.  Exam reveals no friction rub.    No murmur heard.  Pulmonary/Chest: Effort normal. No respiratory distress. He has no wheezes.   Abdominal: Soft. He exhibits no distension.   Musculoskeletal: Normal range of motion. He exhibits no edema.   Neurological: He is alert.   Skin: Skin is warm. He is not diaphoretic.       Significant Labs:  CBC:    Recent Labs  Lab 03/01/18  1721 03/02/18  0349 03/03/18  0248   WBC 5.72 5.21   5.21 6.00  6.00   RBC 4.45* 4.72  4.72 4.80  4.80   HGB 12.2* 12.6*  12.6* 13.1*  13.1*   HCT 37.9* 39.6*  39.6* 40.3  40.3    209  209 212  212   MCV 85 84  84 84  84   MCH 27.4 26.7*  26.7* 27.3  27.3   MCHC 32.2 31.8*  31.8* 32.5  32.5     BNP:    Recent Labs  Lab 03/01/18  0816   *     CMP:    Recent Labs  Lab 03/02/18  0349 03/02/18  1537 03/03/18  0248   GLU 90 76 99   CALCIUM 9.7 10.0 10.0   ALBUMIN 3.8 3.9 3.6   PROT 7.8 7.8 7.8    137 137   K 3.1* 3.9 3.5   CO2 27 28 26   CL 99 98 98   BUN 53* 48* 40*   CREATININE 2.2* 2.3* 2.0*   ALKPHOS 95 98 102   ALT 19 19 19   AST 23 24 24   BILITOT 0.8 0.7 0.7      Coagulation:     Recent Labs  Lab 03/01/18  1721   INR 1.1     LDH:  No results for input(s): LDH in the last 72 hours.  Microbiology:  Microbiology Results (last 7 days)     ** No results found for the last 168 hours. **          I have reviewed all pertinent labs within the past 24 hours.    Estimated Creatinine Clearance: 58.4 mL/min (A) (based on SCr of 2 mg/dL (H)).    Diagnostic Results:  I have reviewed and interpreted all pertinent imaging results/findings within the past 24 hours.

## 2018-03-03 NOTE — PROGRESS NOTES
[facility]  Heart Transplant  Progress Note    Patient Name: Tim Richards  MRN: [id number]  Admission Date: 3/1/2018  Hospital Length of Stay: 2 days  Attending Physician: Amparo Lopez MD  Primary Care Provider: Ja Méndez MD  Principal Problem:Acute decompensated heart failure    Subjective:     Interval History: cvp 4, breathing improved    Continuous Infusions:   DOBUTamine 5 mcg/kg/min (03/03/18 1000)    furosemide (LASIX) 1 mg/mL infusion (non-titrating) 5 mg/hr (03/03/18 1000)     Scheduled Meds:   allopurinol  100 mg Oral TID    amiodarone  200 mg Oral Daily    aspirin  81 mg Oral Daily    sodium chloride 0.9%  3 mL Intravenous Q8H     PRN Meds:traMADol    Review of patient's allergies indicates:   Allergen Reactions    Lisinopril Anaphylaxis     Objective:     Vital Signs (Most Recent):  Temp: 98.1 °F (36.7 °C) (03/03/18 0700)  Pulse: 86 (03/03/18 1000)  Resp: 17 (03/03/18 1000)  BP: 106/63 (03/03/18 1000)  SpO2: 96 % (03/03/18 1000) Vital Signs (24h Range):  Temp:  [97.8 °F (36.6 °C)-98.7 °F (37.1 °C)] 98.1 °F (36.7 °C)  Pulse:  [74-88] 86  Resp:  [16-28] 17  SpO2:  [95 %-100 %] 96 %  BP: ()/(56-73) 106/63     Patient Vitals for the past 72 hrs (Last 3 readings):   Weight   03/02/18 1000 116.5 kg (256 lb 13.4 oz)   03/01/18 1545 120 kg (264 lb 8.8 oz)     Body mass index is 33.89 kg/m².      Intake/Output Summary (Last 24 hours) at 03/03/18 1234  Last data filed at 03/03/18 1000   Gross per 24 hour   Intake             1186 ml   Output             3820 ml   Net            -2634 ml       Hemodynamic Parameters:         Physical Exam   Constitutional: He appears well-developed and well-nourished. No distress.   HENT:   Head: Normocephalic.   Eyes: Pupils are equal, round, and reactive to light.   Neck: Normal range of motion. No JVD present.   Cardiovascular: Normal rate.  Exam reveals no friction rub.    No murmur heard.  Pulmonary/Chest: Effort normal. No  respiratory distress. He has no wheezes.   Abdominal: Soft. He exhibits no distension.   Musculoskeletal: Normal range of motion. He exhibits no edema.   Neurological: He is alert.   Skin: Skin is warm. He is not diaphoretic.       Significant Labs:  CBC:    Recent Labs  Lab 03/01/18  1721 03/02/18  0349 03/03/18  0248   WBC 5.72 5.21  5.21 6.00  6.00   RBC 4.45* 4.72  4.72 4.80  4.80   HGB 12.2* 12.6*  12.6* 13.1*  13.1*   HCT 37.9* 39.6*  39.6* 40.3  40.3    209  209 212  212   MCV 85 84  84 84  84   MCH 27.4 26.7*  26.7* 27.3  27.3   MCHC 32.2 31.8*  31.8* 32.5  32.5     BNP:    Recent Labs  Lab 03/01/18  0816   *     CMP:    Recent Labs  Lab 03/02/18  0349 03/02/18  1537 03/03/18  0248   GLU 90 76 99   CALCIUM 9.7 10.0 10.0   ALBUMIN 3.8 3.9 3.6   PROT 7.8 7.8 7.8    137 137   K 3.1* 3.9 3.5   CO2 27 28 26   CL 99 98 98   BUN 53* 48* 40*   CREATININE 2.2* 2.3* 2.0*   ALKPHOS 95 98 102   ALT 19 19 19   AST 23 24 24   BILITOT 0.8 0.7 0.7      Coagulation:     Recent Labs  Lab 03/01/18  1721   INR 1.1     LDH:  No results for input(s): LDH in the last 72 hours.  Microbiology:  Microbiology Results (last 7 days)     ** No results found for the last 168 hours. **          I have reviewed all pertinent labs within the past 24 hours.    Estimated Creatinine Clearance: 58.4 mL/min (A) (based on SCr of 2 mg/dL (H)).    Diagnostic Results:  I have reviewed and interpreted all pertinent imaging results/findings within the past 24 hours.    Assessment and Plan:     Mr. Richards is a 52 y.o. year old Black or  male who has presents as f/u from hospitalization for ADHF after presenting to clinic 1/10/17 as initial consult. advanced surgical options (LVAD/OHT).    This 53 yo BM has had CHF since 2011 at which time an angiogram did not demonstrate any CAD.  He is on amiodarone for VT. He was admitted from 1/12-1/17/18 (see d/c summary) where he was treated for ADHF and initiation  of w/u for advanced options. RHC during that admission showed RA 17 and PCWP 35 as well as severely reduced CI 1.6. Though not optimized, he was discharged per his request so as not to lose his job/insurance--see Dr. Hadley's attestation for full details on d/c summary. Since d/c, his Scr has risen from 2.0 on discharge to 2.8 (1/23/18). His BNP had declined to 1040.  He presents today for scheduled hospital f/u.      Today, he reports feeling well. He says he has more energy than usual. His only complaints are cramping and xerosis. He denies n/v/d, no CP, palpitations or syncope. He has stable orthostatic dizziness/LH. No PND or orthopnea. His COLEMAN is improved from discharge and his weight is down about 5-7 pounds on his home scale. Of note, his Scr on labs from 2 days ago showed Scr 2.8 up from 2.0. T. Bili was down from 1.1 to 0.6 and BNP was down to 1040 from 1700.  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 No labs initially ordered but I ordered and sent him down.     CKD (chronic kidney disease), stage III    - cr 2.8 now improved to 2 with diuresis and inotrope        PVT (paroxysmal ventricular tachycardia)    -will hold beta blocker for now given hypotension and       Acute on chronic combined systolic and diastolic heart failure    -Cr 2.8 in clinic, now 2, continue diuresis and inotropes   - will place central line now and check SVO2 and CVP  - finish workup for OHT/LVAD and present next week  - continue , transfer to floor        Biventricular implantable cardioverter-defibrillator in situ    - in place            YANET Harris  Heart Transplant  [facility]

## 2018-03-03 NOTE — PROGRESS NOTES
Pt requesting stool softener. MD paged. New orders noted. Will carry out and continue to monitor.

## 2018-03-03 NOTE — PROGRESS NOTES
Coccidioides Serum Ab is negative.   Please see other recs from ID Pre-LVAD/Transplant Evaluation from  2/23.    Fitz Michel PA-C  Pager [phone]

## 2018-03-04 LAB
ALBUMIN SERPL BCP-MCNC: 3.9 G/DL
ALBUMIN SERPL BCP-MCNC: 3.9 G/DL
ALP SERPL-CCNC: 104 U/L
ALP SERPL-CCNC: 96 U/L
ALT SERPL W/O P-5'-P-CCNC: 18 U/L
ALT SERPL W/O P-5'-P-CCNC: 19 U/L
ANION GAP SERPL CALC-SCNC: 10 MMOL/L
ANION GAP SERPL CALC-SCNC: 11 MMOL/L
AST SERPL-CCNC: 22 U/L
AST SERPL-CCNC: 23 U/L
BASOPHILS # BLD AUTO: 0.02 K/UL
BASOPHILS NFR BLD: 0.4 %
BILIRUB SERPL-MCNC: 0.8 MG/DL
BILIRUB SERPL-MCNC: 1 MG/DL
BUN SERPL-MCNC: 28 MG/DL
BUN SERPL-MCNC: 29 MG/DL
CALCIUM SERPL-MCNC: 10 MG/DL
CALCIUM SERPL-MCNC: 9.8 MG/DL
CHLORIDE SERPL-SCNC: 97 MMOL/L
CHLORIDE SERPL-SCNC: 98 MMOL/L
CO2 SERPL-SCNC: 27 MMOL/L
CO2 SERPL-SCNC: 28 MMOL/L
CREAT SERPL-MCNC: 1.8 MG/DL
CREAT SERPL-MCNC: 1.9 MG/DL
DIFFERENTIAL METHOD: ABNORMAL
EOSINOPHIL # BLD AUTO: 0.1 K/UL
EOSINOPHIL NFR BLD: 2.8 %
ERYTHROCYTE [DISTWIDTH] IN BLOOD BY AUTOMATED COUNT: 15.5 %
EST. GFR  (AFRICAN AMERICAN): 46.2 ML/MIN/1.73 M^2
EST. GFR  (AFRICAN AMERICAN): 49.3 ML/MIN/1.73 M^2
EST. GFR  (NON AFRICAN AMERICAN): 39.9 ML/MIN/1.73 M^2
EST. GFR  (NON AFRICAN AMERICAN): 42.6 ML/MIN/1.73 M^2
GLUCOSE SERPL-MCNC: 70 MG/DL
GLUCOSE SERPL-MCNC: 86 MG/DL
HCT VFR BLD AUTO: 41.5 %
HGB BLD-MCNC: 13.4 G/DL
IMM GRANULOCYTES # BLD AUTO: 0.02 K/UL
IMM GRANULOCYTES NFR BLD AUTO: 0.4 %
LYMPHOCYTES # BLD AUTO: 1.1 K/UL
LYMPHOCYTES NFR BLD: 21.2 %
MCH RBC QN AUTO: 27.9 PG
MCHC RBC AUTO-ENTMCNC: 32.3 G/DL
MCV RBC AUTO: 86 FL
MONOCYTES # BLD AUTO: 0.6 K/UL
MONOCYTES NFR BLD: 11.9 %
NEUTROPHILS # BLD AUTO: 3.1 K/UL
NEUTROPHILS NFR BLD: 63.3 %
NRBC BLD-RTO: 0 /100 WBC
PLATELET # BLD AUTO: 211 K/UL
PMV BLD AUTO: 11.4 FL
POTASSIUM SERPL-SCNC: 3.6 MMOL/L
POTASSIUM SERPL-SCNC: 4.2 MMOL/L
PROT SERPL-MCNC: 7.7 G/DL
PROT SERPL-MCNC: 7.9 G/DL
RBC # BLD AUTO: 4.81 M/UL
SODIUM SERPL-SCNC: 135 MMOL/L
SODIUM SERPL-SCNC: 136 MMOL/L
WBC # BLD AUTO: 4.95 K/UL

## 2018-03-04 PROCEDURE — 20600001 HC STEP DOWN PRIVATE ROOM: Mod: NTX

## 2018-03-04 PROCEDURE — 25000003 PHARM REV CODE 250: Mod: NTX | Performed by: INTERNAL MEDICINE

## 2018-03-04 PROCEDURE — 85025 COMPLETE CBC W/AUTO DIFF WBC: CPT | Mod: NTX

## 2018-03-04 PROCEDURE — A4216 STERILE WATER/SALINE, 10 ML: HCPCS | Mod: NTX | Performed by: PHYSICIAN ASSISTANT

## 2018-03-04 PROCEDURE — 63600175 PHARM REV CODE 636 W HCPCS: Mod: NTX | Performed by: STUDENT IN AN ORGANIZED HEALTH CARE EDUCATION/TRAINING PROGRAM

## 2018-03-04 PROCEDURE — 25000003 PHARM REV CODE 250: Mod: NTX | Performed by: PHYSICIAN ASSISTANT

## 2018-03-04 PROCEDURE — 99232 SBSQ HOSP IP/OBS MODERATE 35: CPT | Mod: NTX,,, | Performed by: INTERNAL MEDICINE

## 2018-03-04 PROCEDURE — 80053 COMPREHEN METABOLIC PANEL: CPT | Mod: NTX

## 2018-03-04 RX ORDER — POTASSIUM CHLORIDE 20 MEQ/1
40 TABLET, EXTENDED RELEASE ORAL ONCE
Status: COMPLETED | OUTPATIENT
Start: 2018-03-04 | End: 2018-03-04

## 2018-03-04 RX ADMIN — DOBUTAMINE IN DEXTROSE 5 MCG/KG/MIN: 200 INJECTION, SOLUTION INTRAVENOUS at 06:03

## 2018-03-04 RX ADMIN — ALLOPURINOL 100 MG: 100 TABLET ORAL at 09:03

## 2018-03-04 RX ADMIN — ASPIRIN 81 MG: 81 TABLET, COATED ORAL at 09:03

## 2018-03-04 RX ADMIN — POTASSIUM CHLORIDE 40 MEQ: 1500 TABLET, EXTENDED RELEASE ORAL at 09:03

## 2018-03-04 RX ADMIN — ALLOPURINOL 100 MG: 100 TABLET ORAL at 03:03

## 2018-03-04 RX ADMIN — DOBUTAMINE IN DEXTROSE 5 MCG/KG/MIN: 200 INJECTION, SOLUTION INTRAVENOUS at 05:03

## 2018-03-04 RX ADMIN — DOCUSATE SODIUM 100 MG: 100 CAPSULE, LIQUID FILLED ORAL at 08:03

## 2018-03-04 RX ADMIN — ALLOPURINOL 100 MG: 100 TABLET ORAL at 08:03

## 2018-03-04 RX ADMIN — AMIODARONE HYDROCHLORIDE 200 MG: 200 TABLET ORAL at 09:03

## 2018-03-04 RX ADMIN — DOCUSATE SODIUM 100 MG: 100 CAPSULE, LIQUID FILLED ORAL at 09:03

## 2018-03-04 RX ADMIN — DOCUSATE SODIUM 100 MG: 100 CAPSULE, LIQUID FILLED ORAL at 03:03

## 2018-03-04 RX ADMIN — Medication 3 ML: at 04:03

## 2018-03-04 NOTE — PLAN OF CARE
Problem: Patient Care Overview  Goal: Plan of Care Review  Outcome: Ongoing (interventions implemented as appropriate)  Patient AAOx4, VSS, afebrile, and without injury. Fall precautions maintained. Patient instructed on how to contact the nurse. No complaints of pain or nausea. Patient without distress on room air. Family at bedside. Patient on cardiac diet with good appetite. Patient up ad bibiana; voiding spontaneously clear yellow. Dobutamine and lasix continued IV. Continuous tele continued; HR 60s-70s. Afternoon CMP to be collected. Questions and concerns have been addressed; will continue to monitor.

## 2018-03-04 NOTE — PLAN OF CARE
Problem: Patient Care Overview  Goal: Plan of Care Review  Outcome: Ongoing (interventions implemented as appropriate)  Plan of care reviewed. Lasix and Dobutamine gtts continue. LVAD workup in progress. Plan for c scope on Monday.

## 2018-03-04 NOTE — PROGRESS NOTES
[facility]  Heart Transplant  Progress Note    Patient Name: Tim Richards  MRN: [id number]  Admission Date: 3/1/2018  Hospital Length of Stay: 3 days  Attending Physician: Amparo Lopez MD  Primary Care Provider: Ja Méndez MD  Principal Problem:Acute decompensated heart failure    Subjective:     Interval History: patient moved to floor yesterday, CVP 6 diuresing well     Continuous Infusions:   DOBUTamine 5 mcg/kg/min (03/04/18 0517)    furosemide (LASIX) 1 mg/mL infusion (non-titrating) 5 mg/hr (03/03/18 1200)     Scheduled Meds:   allopurinol  100 mg Oral TID    amiodarone  200 mg Oral Daily    aspirin  81 mg Oral Daily    docusate sodium  100 mg Oral TID    potassium chloride  40 mEq Oral Once    sodium chloride 0.9%  3 mL Intravenous Q8H     PRN Meds:[email or web address]    Review of patient's allergies indicates:   Allergen Reactions    Lisinopril Anaphylaxis     Objective:     Vital Signs (Most Recent):  Temp: 98.3 °F (36.8 °C) (03/04/18 0621)  Pulse: 85 (03/04/18 0621)  Resp: 16 (03/03/18 1924)  BP: 111/61 (03/04/18 0621)  SpO2: 97 % (03/04/18 0621) Vital Signs (24h Range):  Temp:  [96.6 °F (35.9 °C)-98.7 °F (37.1 °C)] 98.3 °F (36.8 °C)  Pulse:  [65-95] 85  Resp:  [16-18] 16  SpO2:  [94 %-100 %] 97 %  BP: (102-114)/(57-73) 111/61     Patient Vitals for the past 72 hrs (Last 3 readings):   Weight   03/04/18 0700 117.7 kg (259 lb 7.7 oz)   03/02/18 1000 116.5 kg (256 lb 13.4 oz)   03/01/18 1545 120 kg (264 lb 8.8 oz)     Body mass index is 34.23 kg/m².      Intake/Output Summary (Last 24 hours) at 03/04/18 0752  Last data filed at 03/04/18 0600   Gross per 24 hour   Intake              797 ml   Output             1600 ml   Net             -803 ml       Hemodynamic Parameters:         Physical Exam   Constitutional: He appears well-developed and well-nourished. No distress.   HENT:   Head: Normocephalic.   Eyes: Pupils are equal, round, and reactive to light.   Neck: Normal  range of motion. No JVD present.   Cardiovascular: Normal rate.  Exam reveals no friction rub.    No murmur heard.  Pulmonary/Chest: Effort normal. No respiratory distress. He has no wheezes.   Abdominal: Soft. He exhibits no distension.   Musculoskeletal: Normal range of motion. He exhibits no edema.   Neurological: He is alert.   Skin: Skin is warm. He is not diaphoretic.       Significant Labs:  CBC:    Recent Labs  Lab 03/02/18  0349 03/03/18  0248 03/04/18  0530   WBC 5.21  5.21 6.00  6.00 4.95   RBC 4.72  4.72 4.80  4.80 4.81   HGB 12.6*  12.6* 13.1*  13.1* 13.4*   HCT 39.6*  39.6* 40.3  40.3 41.5     209 212  212 211   MCV 84  84 84  84 86   MCH 26.7*  26.7* 27.3  27.3 27.9   MCHC 31.8*  31.8* 32.5  32.5 32.3     BNP:    Recent Labs  Lab 03/01/18  0816   *     CMP:    Recent Labs  Lab 03/03/18  0248 03/03/18  1555 03/04/18  0530   GLU 99 101 86   CALCIUM 10.0 9.9 9.8   ALBUMIN 3.6 3.6 3.9   PROT 7.8 7.8 7.7    136 135*   K 3.5 4.2 3.6   CO2 26 28 27   CL 98 97 97   BUN 40* 32* 29*   CREATININE 2.0* 1.9* 1.8*   ALKPHOS 102 100 96   ALT 19 20 18   AST 24 23 22   BILITOT 0.7 0.8 1.0      Coagulation:     Recent Labs  Lab 03/01/18  1721   INR 1.1     LDH:  No results for input(s): LDH in the last 72 hours.  Microbiology:  Microbiology Results (last 7 days)     ** No results found for the last 168 hours. **          I have reviewed all pertinent labs within the past 24 hours.    Estimated Creatinine Clearance: 65.2 mL/min (A) (based on SCr of 1.8 mg/dL (H)).    Diagnostic Results:  I have reviewed and interpreted all pertinent imaging results/findings within the past 24 hours.    Assessment and Plan:     Mr. Richards is a 52 y.o. year old Black or  male who has presents as f/u from hospitalization for ADHF after presenting to clinic 1/10/17 as initial consult. advanced surgical options (LVAD/OHT).    This 51 yo BM has had CHF since 2011 at which time an angiogram  did not demonstrate any CAD.  He is on amiodarone for VT. He was admitted from 1/12-1/17/18 (see d/c summary) where he was treated for ADHF and initiation of w/u for advanced options. RHC during that admission showed RA 17 and PCWP 35 as well as severely reduced CI 1.6. Though not optimized, he was discharged per his request so as not to lose his job/insurance--see Dr. Hadley's attestation for full details on d/c summary. Since d/c, his Scr has risen from 2.0 on discharge to 2.8 (1/23/18). His BNP had declined to 1040.  He presents today for scheduled hospital f/u.      Today, he reports feeling well. He says he has more energy than usual. His only complaints are cramping and xerosis. He denies n/v/d, no CP, palpitations or syncope. He has stable orthostatic dizziness/LH. No PND or orthopnea. His COLEMAN is improved from discharge and his weight is down about 5-7 pounds on his home scale. Of note, his Scr on labs from 2 days ago showed Scr 2.8 up from 2.0. T. Bili was down from 1.1 to 0.6 and BNP was down to 1040 from 1700.  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 No labs initially ordered but I ordered and sent him down.     CKD (chronic kidney disease), stage III    - cr improving         PVT (paroxysmal ventricular tachycardia)    -off beta blocker now due to inotrope         Acute on chronic combined systolic and diastolic heart failure    -Cr 2.8 in clinic now 1.8, continue lasix gtt   - CVP 6  - LVAD/OHT evaluation complete after colonoscopy, will speak with CRS today about timing   - off GDMT due to hypotension on admit and renal function         Biventricular implantable cardioverter-defibrillator in situ    - in place            YANET Harris  Heart Transplant  [facility]

## 2018-03-04 NOTE — SUBJECTIVE & OBJECTIVE
Interval History: patient moved to floor yesterday, CVP 6 diuresing well     Continuous Infusions:   DOBUTamine 5 mcg/kg/min (03/04/18 0517)    furosemide (LASIX) 1 mg/mL infusion (non-titrating) 5 mg/hr (03/03/18 1200)     Scheduled Meds:   allopurinol  100 mg Oral TID    amiodarone  200 mg Oral Daily    aspirin  81 mg Oral Daily    docusate sodium  100 mg Oral TID    potassium chloride  40 mEq Oral Once    sodium chloride 0.9%  3 mL Intravenous Q8H     PRN Meds:[email or web address]    Review of patient's allergies indicates:   Allergen Reactions    Lisinopril Anaphylaxis     Objective:     Vital Signs (Most Recent):  Temp: 98.3 °F (36.8 °C) (03/04/18 0621)  Pulse: 85 (03/04/18 0621)  Resp: 16 (03/03/18 1924)  BP: 111/61 (03/04/18 0621)  SpO2: 97 % (03/04/18 0621) Vital Signs (24h Range):  Temp:  [96.6 °F (35.9 °C)-98.7 °F (37.1 °C)] 98.3 °F (36.8 °C)  Pulse:  [65-95] 85  Resp:  [16-18] 16  SpO2:  [94 %-100 %] 97 %  BP: (102-114)/(57-73) 111/61     Patient Vitals for the past 72 hrs (Last 3 readings):   Weight   03/04/18 0700 117.7 kg (259 lb 7.7 oz)   03/02/18 1000 116.5 kg (256 lb 13.4 oz)   03/01/18 1545 120 kg (264 lb 8.8 oz)     Body mass index is 34.23 kg/m².      Intake/Output Summary (Last 24 hours) at 03/04/18 0752  Last data filed at 03/04/18 0600   Gross per 24 hour   Intake              797 ml   Output             1600 ml   Net             -803 ml       Hemodynamic Parameters:         Physical Exam   Constitutional: He appears well-developed and well-nourished. No distress.   HENT:   Head: Normocephalic.   Eyes: Pupils are equal, round, and reactive to light.   Neck: Normal range of motion. No JVD present.   Cardiovascular: Normal rate.  Exam reveals no friction rub.    No murmur heard.  Pulmonary/Chest: Effort normal. No respiratory distress. He has no wheezes.   Abdominal: Soft. He exhibits no distension.   Musculoskeletal: Normal range of motion. He exhibits no edema.   Neurological: He is alert.    Skin: Skin is warm. He is not diaphoretic.       Significant Labs:  CBC:    Recent Labs  Lab 03/02/18  0349 03/03/18  0248 03/04/18  0530   WBC 5.21  5.21 6.00  6.00 4.95   RBC 4.72  4.72 4.80  4.80 4.81   HGB 12.6*  12.6* 13.1*  13.1* 13.4*   HCT 39.6*  39.6* 40.3  40.3 41.5     209 212  212 211   MCV 84  84 84  84 86   MCH 26.7*  26.7* 27.3  27.3 27.9   MCHC 31.8*  31.8* 32.5  32.5 32.3     BNP:    Recent Labs  Lab 03/01/18  0816   *     CMP:    Recent Labs  Lab 03/03/18  0248 03/03/18  1555 03/04/18  0530   GLU 99 101 86   CALCIUM 10.0 9.9 9.8   ALBUMIN 3.6 3.6 3.9   PROT 7.8 7.8 7.7    136 135*   K 3.5 4.2 3.6   CO2 26 28 27   CL 98 97 97   BUN 40* 32* 29*   CREATININE 2.0* 1.9* 1.8*   ALKPHOS 102 100 96   ALT 19 20 18   AST 24 23 22   BILITOT 0.7 0.8 1.0      Coagulation:     Recent Labs  Lab 03/01/18  1721   INR 1.1     LDH:  No results for input(s): LDH in the last 72 hours.  Microbiology:  Microbiology Results (last 7 days)     ** No results found for the last 168 hours. **          I have reviewed all pertinent labs within the past 24 hours.    Estimated Creatinine Clearance: 65.2 mL/min (A) (based on SCr of 1.8 mg/dL (H)).    Diagnostic Results:  I have reviewed and interpreted all pertinent imaging results/findings within the past 24 hours.

## 2018-03-05 ENCOUNTER — ANESTHESIA EVENT (OUTPATIENT)
Dept: ENDOSCOPY | Facility: HOSPITAL | Age: 53
DRG: 001 | End: 2018-03-05
Payer: COMMERCIAL

## 2018-03-05 DIAGNOSIS — I50.9 HEART FAILURE, UNSPECIFIED HEART FAILURE CHRONICITY, UNSPECIFIED HEART FAILURE TYPE: Primary | ICD-10-CM

## 2018-03-05 PROBLEM — Z12.11 SCREENING FOR COLON CANCER: Status: ACTIVE | Noted: 2018-03-05

## 2018-03-05 LAB
ALBUMIN SERPL BCP-MCNC: 3.7 G/DL
ALBUMIN SERPL BCP-MCNC: 3.8 G/DL
ALP SERPL-CCNC: 110 U/L
ALP SERPL-CCNC: 95 U/L
ALT SERPL W/O P-5'-P-CCNC: 19 U/L
ALT SERPL W/O P-5'-P-CCNC: 19 U/L
ANION GAP SERPL CALC-SCNC: 10 MMOL/L
ANION GAP SERPL CALC-SCNC: 8 MMOL/L
AST SERPL-CCNC: 23 U/L
AST SERPL-CCNC: 24 U/L
BASOPHILS # BLD AUTO: 0.01 K/UL
BASOPHILS NFR BLD: 0.2 %
BILIRUB SERPL-MCNC: 0.7 MG/DL
BILIRUB SERPL-MCNC: 0.9 MG/DL
BNP SERPL-MCNC: 1270 PG/ML
BUN SERPL-MCNC: 24 MG/DL
BUN SERPL-MCNC: 28 MG/DL
CALCIUM SERPL-MCNC: 9.8 MG/DL
CALCIUM SERPL-MCNC: 9.9 MG/DL
CHLORIDE SERPL-SCNC: 100 MMOL/L
CHLORIDE SERPL-SCNC: 102 MMOL/L
CO2 SERPL-SCNC: 28 MMOL/L
CO2 SERPL-SCNC: 28 MMOL/L
CREAT SERPL-MCNC: 1.8 MG/DL
CREAT SERPL-MCNC: 1.9 MG/DL
DIFFERENTIAL METHOD: ABNORMAL
EOSINOPHIL # BLD AUTO: 0.2 K/UL
EOSINOPHIL NFR BLD: 3.4 %
ERYTHROCYTE [DISTWIDTH] IN BLOOD BY AUTOMATED COUNT: 15.6 %
EST. GFR  (AFRICAN AMERICAN): 46.2 ML/MIN/1.73 M^2
EST. GFR  (AFRICAN AMERICAN): 49.3 ML/MIN/1.73 M^2
EST. GFR  (NON AFRICAN AMERICAN): 39.9 ML/MIN/1.73 M^2
EST. GFR  (NON AFRICAN AMERICAN): 42.6 ML/MIN/1.73 M^2
ESTIMATED PA SYSTOLIC PRESSURE: 38.28
GLUCOSE SERPL-MCNC: 87 MG/DL
GLUCOSE SERPL-MCNC: 95 MG/DL
HCT VFR BLD AUTO: 40 %
HGB BLD-MCNC: 13.2 G/DL
IMM GRANULOCYTES # BLD AUTO: 0.01 K/UL
IMM GRANULOCYTES NFR BLD AUTO: 0.2 %
LYMPHOCYTES # BLD AUTO: 1.1 K/UL
LYMPHOCYTES NFR BLD: 20.7 %
MAGNESIUM SERPL-MCNC: 2 MG/DL
MCH RBC QN AUTO: 28.2 PG
MCHC RBC AUTO-ENTMCNC: 33 G/DL
MCV RBC AUTO: 86 FL
MITRAL VALVE REGURGITATION: ABNORMAL
MONOCYTES # BLD AUTO: 0.7 K/UL
MONOCYTES NFR BLD: 13.2 %
NEUTROPHILS # BLD AUTO: 3.2 K/UL
NEUTROPHILS NFR BLD: 62.3 %
NRBC BLD-RTO: 0 /100 WBC
PLATELET # BLD AUTO: 199 K/UL
PMV BLD AUTO: 11.4 FL
POTASSIUM SERPL-SCNC: 3.7 MMOL/L
POTASSIUM SERPL-SCNC: 4.1 MMOL/L
PROT SERPL-MCNC: 7.4 G/DL
PROT SERPL-MCNC: 7.5 G/DL
RBC # BLD AUTO: 4.68 M/UL
RETIRED EF AND QEF - SEE NOTES: 8 (ref 55–65)
SODIUM SERPL-SCNC: 138 MMOL/L
SODIUM SERPL-SCNC: 138 MMOL/L
TRICUSPID VALVE REGURGITATION: ABNORMAL
WBC # BLD AUTO: 5.07 K/UL

## 2018-03-05 PROCEDURE — 99232 SBSQ HOSP IP/OBS MODERATE 35: CPT | Mod: ,,, | Performed by: INTERNAL MEDICINE

## 2018-03-05 PROCEDURE — 20600001 HC STEP DOWN PRIVATE ROOM: Mod: NTX

## 2018-03-05 PROCEDURE — A4216 STERILE WATER/SALINE, 10 ML: HCPCS | Mod: NTX | Performed by: PHYSICIAN ASSISTANT

## 2018-03-05 PROCEDURE — 83880 ASSAY OF NATRIURETIC PEPTIDE: CPT | Mod: NTX

## 2018-03-05 PROCEDURE — 63600175 PHARM REV CODE 636 W HCPCS: Mod: NTX | Performed by: PHYSICIAN ASSISTANT

## 2018-03-05 PROCEDURE — 85025 COMPLETE CBC W/AUTO DIFF WBC: CPT | Mod: NTX

## 2018-03-05 PROCEDURE — 80053 COMPREHEN METABOLIC PANEL: CPT | Mod: NTX

## 2018-03-05 PROCEDURE — 63600175 PHARM REV CODE 636 W HCPCS: Mod: NTX | Performed by: INTERNAL MEDICINE

## 2018-03-05 PROCEDURE — 25000003 PHARM REV CODE 250: Mod: NTX | Performed by: INTERNAL MEDICINE

## 2018-03-05 PROCEDURE — 93306 TTE W/DOPPLER COMPLETE: CPT | Mod: 26,,, | Performed by: INTERNAL MEDICINE

## 2018-03-05 PROCEDURE — 25000003 PHARM REV CODE 250: Mod: NTX | Performed by: PHYSICIAN ASSISTANT

## 2018-03-05 PROCEDURE — 63600175 PHARM REV CODE 636 W HCPCS: Mod: NTX | Performed by: STUDENT IN AN ORGANIZED HEALTH CARE EDUCATION/TRAINING PROGRAM

## 2018-03-05 PROCEDURE — 83735 ASSAY OF MAGNESIUM: CPT | Mod: NTX

## 2018-03-05 PROCEDURE — 25000003 PHARM REV CODE 250: Mod: NTX | Performed by: NURSE PRACTITIONER

## 2018-03-05 PROCEDURE — 99254 IP/OBS CNSLTJ NEW/EST MOD 60: CPT | Mod: NTX,,, | Performed by: INTERNAL MEDICINE

## 2018-03-05 PROCEDURE — C8929 TTE W OR WO FOL WCON,DOPPLER: HCPCS

## 2018-03-05 PROCEDURE — 27100094 HC IABP, SET-UP: Mod: NTX

## 2018-03-05 RX ORDER — ENOXAPARIN SODIUM 100 MG/ML
40 INJECTION SUBCUTANEOUS EVERY 24 HOURS
Status: DISCONTINUED | OUTPATIENT
Start: 2018-03-05 | End: 2018-03-06

## 2018-03-05 RX ORDER — POLYETHYLENE GLYCOL 3350, SODIUM SULFATE ANHYDROUS, SODIUM BICARBONATE, SODIUM CHLORIDE, POTASSIUM CHLORIDE 236; 22.74; 6.74; 5.86; 2.97 G/4L; G/4L; G/4L; G/4L; G/4L
4000 POWDER, FOR SOLUTION ORAL ONCE
Status: COMPLETED | OUTPATIENT
Start: 2018-03-05 | End: 2018-03-05

## 2018-03-05 RX ORDER — FUROSEMIDE 10 MG/ML
40 INJECTION INTRAMUSCULAR; INTRAVENOUS 2 TIMES DAILY
Status: DISCONTINUED | OUTPATIENT
Start: 2018-03-05 | End: 2018-03-07

## 2018-03-05 RX ORDER — POTASSIUM CHLORIDE 20 MEQ/1
40 TABLET, EXTENDED RELEASE ORAL ONCE
Status: COMPLETED | OUTPATIENT
Start: 2018-03-05 | End: 2018-03-05

## 2018-03-05 RX ADMIN — ENOXAPARIN SODIUM 40 MG: 100 INJECTION SUBCUTANEOUS at 05:03

## 2018-03-05 RX ADMIN — ALLOPURINOL 100 MG: 100 TABLET ORAL at 03:03

## 2018-03-05 RX ADMIN — POLYETHYLENE GLYCOL 3350, SODIUM SULFATE ANHYDROUS, SODIUM BICARBONATE, SODIUM CHLORIDE, POTASSIUM CHLORIDE 4000 ML: 236; 22.74; 6.74; 5.86; 2.97 POWDER, FOR SOLUTION ORAL at 04:03

## 2018-03-05 RX ADMIN — ALLOPURINOL 100 MG: 100 TABLET ORAL at 08:03

## 2018-03-05 RX ADMIN — FUROSEMIDE 5 MG/HR: 10 INJECTION, SOLUTION INTRAVENOUS at 02:03

## 2018-03-05 RX ADMIN — DOBUTAMINE IN DEXTROSE 5 MCG/KG/MIN: 200 INJECTION, SOLUTION INTRAVENOUS at 12:03

## 2018-03-05 RX ADMIN — AMIODARONE HYDROCHLORIDE 200 MG: 200 TABLET ORAL at 08:03

## 2018-03-05 RX ADMIN — DOCUSATE SODIUM 100 MG: 100 CAPSULE, LIQUID FILLED ORAL at 08:03

## 2018-03-05 RX ADMIN — Medication 3 ML: at 10:03

## 2018-03-05 RX ADMIN — ASPIRIN 81 MG: 81 TABLET, COATED ORAL at 08:03

## 2018-03-05 RX ADMIN — TRAMADOL HYDROCHLORIDE 50 MG: 50 TABLET, FILM COATED ORAL at 09:03

## 2018-03-05 RX ADMIN — POTASSIUM CHLORIDE 40 MEQ: 1500 TABLET, EXTENDED RELEASE ORAL at 08:03

## 2018-03-05 RX ADMIN — FUROSEMIDE 40 MG: 10 INJECTION, SOLUTION INTRAMUSCULAR; INTRAVENOUS at 05:03

## 2018-03-05 RX ADMIN — DOCUSATE SODIUM 100 MG: 100 CAPSULE, LIQUID FILLED ORAL at 03:03

## 2018-03-05 NOTE — HPI
Mr. Richards is a 52 y.o. year old Black or  male who has presents as f/u March 1from hospitalization for ADHF after presenting to clinic 1/10/17 as initial consult. advanced surgical options (LVAD/OHT).    This 51 yo BM has had CHF since 2011 at which time an angiogram did not demonstrate any CAD.  He is on amiodarone for VT. He was admitted from 1/12-1/17/18 (see d/c summary) where he was treated for ADHF and initiation of w/u for advanced options. RHC during that admission showed RA 17 and PCWP 35 as well as severely reduced CI 1.6. Though not optimized, he was discharged per his request so as not to lose his job/insurance--see Dr. Hadley's attestation for full details on d/c summary. Since d/c, his Scr has risen from 2.0 on discharge to 2.8 (1/23/18). His BNP had declined to 1040.  He presents today for scheduled hospital f/u. He was admitted for dobitamine gtt,   CRS consulted for colonoscopy as part of workup for heart transplant, pt denies ever having colonoscopy  No hx of colon or rectal surgery  No family hx of CRC  Has bm daily, denies abd or rectal pain, no blood per rectum

## 2018-03-05 NOTE — CONSULTS
Informed EMILY Steele that [facility] unable to see patient tonight due to high consult volume.  Notify anesthesia on call for IV access over night if needed.  Please reconsult if unable to obtain PIV overnight.

## 2018-03-05 NOTE — PROGRESS NOTES
[facility]  Heart Transplant  Progress Note    Patient Name: Tim Richards  MRN: [id number]  Admission Date: 3/1/2018  Hospital Length of Stay: 4 days  Attending Physician: Amparo Lopez MD  Primary Care Provider: Ja Méndez MD  Principal Problem:Acute decompensated heart failure    Subjective:     Interval History: No complaints overnight. Plan for c-scope tomorrow, CRS consulted, will see patient today and place orders for prep.    Continuous Infusions:   DOBUTamine 5 mcg/kg/min (03/04/18 1824)     Scheduled Meds:   allopurinol  100 mg Oral TID    amiodarone  200 mg Oral Daily    aspirin  81 mg Oral Daily    docusate sodium  100 mg Oral TID    furosemide  40 mg Intravenous BID    polyethylene glycol  4,000 mL Oral Once    sodium chloride 0.9%  3 mL Intravenous Q8H     PRN Meds:[email or web address]    Review of patient's allergies indicates:   Allergen Reactions    Lisinopril Anaphylaxis     Objective:     Vital Signs (Most Recent):  Temp: 98.5 °F (36.9 °C) (03/05/18 0727)  Pulse: 89 (03/05/18 0727)  Resp: 16 (03/05/18 0727)  BP: 125/84 (03/05/18 0727)  SpO2: 97 % (03/05/18 0727) Vital Signs (24h Range):  Temp:  [97.8 °F (36.6 °C)-99.2 °F (37.3 °C)] 98.5 °F (36.9 °C)  Pulse:  [72-99] 89  Resp:  [16-18] 16  SpO2:  [94 %-98 %] 97 %  BP: (106-131)/(68-84) 125/84     Patient Vitals for the past 72 hrs (Last 3 readings):   Weight   03/05/18 0700 118.1 kg (260 lb 5.8 oz)   03/04/18 0700 117.7 kg (259 lb 7.7 oz)     Body mass index is 34.35 kg/m².      Intake/Output Summary (Last 24 hours) at 03/05/18 1009  Last data filed at 03/05/18 0600   Gross per 24 hour   Intake             1564 ml   Output             1775 ml   Net             -211 ml       Hemodynamic Parameters:         Physical Exam   Constitutional: He appears well-developed and well-nourished. No distress.   HENT:   Head: Normocephalic and atraumatic.   Eyes: Pupils are equal, round, and reactive to light.   Neck: Normal range  of motion. No JVD (at clavicle) present.   Cardiovascular: Normal rate.  Exam reveals no friction rub.    No murmur heard.  Pulmonary/Chest: Effort normal. No respiratory distress. He has no wheezes.   Abdominal: Soft. He exhibits no distension.   Musculoskeletal: Normal range of motion. He exhibits no edema.   Neurological: He is alert.   Skin: Skin is warm. Capillary refill takes 2 to 3 seconds. He is not diaphoretic. No erythema.   Psychiatric: He has a normal mood and affect. His behavior is normal. Judgment and thought content normal.     Significant Labs:  CBC:    Recent Labs  Lab 03/03/18  0248 03/04/18  0530 03/05/18  0520   WBC 6.00  6.00 4.95 5.07   RBC 4.80  4.80 4.81 4.68   HGB 13.1*  13.1* 13.4* 13.2*   HCT 40.3  40.3 41.5 40.0     212 211 199   MCV 84  84 86 86   MCH 27.3  27.3 27.9 28.2   MCHC 32.5  32.5 32.3 33.0     BNP:    Recent Labs  Lab 03/01/18  0816 03/05/18  0820   * 1,270*     CMP:    Recent Labs  Lab 03/04/18  0530 03/04/18  1644 03/05/18  0520   GLU 86 70 95   CALCIUM 9.8 10.0 9.8   ALBUMIN 3.9 3.9 3.8   PROT 7.7 7.9 7.5   * 136 138   K 3.6 4.2 3.7   CO2 27 28 28   CL 97 98 100   BUN 29* 28* 28*   CREATININE 1.8* 1.9* 1.9*   ALKPHOS 96 104 110   ALT 18 19 19   AST 22 23 23   BILITOT 1.0 0.8 0.7      Coagulation:     Recent Labs  Lab 03/01/18  1721   INR 1.1     LDH:  No results for input(s): LDH in the last 72 hours.  Microbiology:  Microbiology Results (last 7 days)     ** No results found for the last 168 hours. **          I have reviewed all pertinent labs within the past 24 hours.    Estimated Creatinine Clearance: 61.9 mL/min (A) (based on SCr of 1.9 mg/dL (H)).    Diagnostic Results:  I have reviewed and interpreted all pertinent imaging results/findings within the past 24 hours.    Assessment and Plan:     Mr. Richards is a 52 y.o. year old Black or  male who has presents as f/u from hospitalization for ADHF after presenting to clinic  1/10/17 as initial consult. advanced surgical options (LVAD/OHT).    This 51 yo BM has had CHF since 2011 at which time an angiogram did not demonstrate any CAD.  He is on amiodarone for VT. He was admitted from 1/12-1/17/18 (see d/c summary) where he was treated for ADHF and initiation of w/u for advanced options. RHC during that admission showed RA 17 and PCWP 35 as well as severely reduced CI 1.6. Though not optimized, he was discharged per his request so as not to lose his job/insurance--see Dr. Hadley's attestation for full details on d/c summary. Since d/c, his Scr has risen from 2.0 on discharge to 2.8 (1/23/18). His BNP had declined to 1040.  He presents today for scheduled hospital f/u.      Today, he reports feeling well. He says he has more energy than usual. His only complaints are cramping and xerosis. He denies n/v/d, no CP, palpitations or syncope. He has stable orthostatic dizziness/LH. No PND or orthopnea. His COLEMAN is improved from discharge and his weight is down about 5-7 pounds on his home scale. Of note, his Scr on labs from 2 days ago showed Scr 2.8 up from 2.0. T. Bili was down from 1.1 to 0.6 and BNP was down to 1040 from 1700.  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 No labs initially ordered but I ordered and sent him down.     CKD (chronic kidney disease), stage III    - cr 1.9 today  - Best course is to admit for accelerated evaluation and consideration for IABP to see if kidneys are reversible and discuss LVAD and txp (CTS to see patient today, discussed with CTS midlevel)        PVT (paroxysmal ventricular tachycardia)    -will hold beta blocker for now given hypotension, now on         Acute on chronic combined systolic and diastolic heart failure    - Cr 2.8 in clinic, down to 1.9 now s/p diuresis and initiation of inotropes  - Consider removing central line due to infection risk  - JVD at clavicle lying flat today, will d/c lasix ggt and start IV push  lasix (CVP 6 yesterday)  - LVAD may not be a viable option with his VT though in his case though this may have been ADHF-related VT and not scar-based. However, we must improve his renal function for that to be an option as well. CO/CI moderately-severely reduced by recent RHC but VT makes inotropes very difficult. CTS to see patient today to weigh in.  - will hold off on GDMT for now as patient has potential for VAD this Admit        Biventricular implantable cardioverter-defibrillator in situ    - in place  - thinks he was shocked last week sometime, will interrogate device            Ivette Gustafson PA-C  Heart Transplant  [facility]

## 2018-03-05 NOTE — SUBJECTIVE & OBJECTIVE
PTA Medications   Medication Sig    allopurinol (ZYLOPRIM) 100 MG tablet TAKE 1 TABLET (100 MG TOTAL) BY MOUTH 3 (THREE) TIMES DAILY.    amiodarone (PACERONE) 200 MG Tab TAKE 1 TABLET (200 MG TOTAL) BY MOUTH ONCE DAILY.    aspirin (ECOTRIN) 81 MG EC tablet Take 81 mg by mouth. 1 Tablet, Delayed Release (E.C.) Oral Every day    bumetanide (BUMEX) 2 MG tablet Take 1 tablet (2 mg total) by mouth 2 (two) times daily.    losartan (COZAAR) 25 MG tablet Take 25 mg by mouth once daily.    potassium chloride SA (K-DUR,KLOR-CON) 10 MEQ tablet Take 2 tablets (20 mEq total) by mouth 2 (two) times daily.    spironolactone (ALDACTONE) 25 MG tablet Take 1 tablet (25 mg total) by mouth once daily.    ERGOCALCIFEROL, VITAMIN D2, (VITAMIN D ORAL) Take by mouth once daily.    metOLazone (ZAROXOLYN) 2.5 MG tablet Take 2.5 mg by mouth every other day.    metoprolol succinate (TOPROL-XL) 25 MG 24 hr tablet Take 0.5 tablets (12.5 mg total) by mouth once daily.    valsartan (DIOVAN) 80 MG tablet Take 0.5 tablets (40 mg total) by mouth 2 (two) times daily.    VITAMIN E ACETATE (VITAMIN E ORAL) Take by mouth once daily.       Review of patient's allergies indicates:   Allergen Reactions    Lisinopril Anaphylaxis       Past Medical History:   Diagnosis Date    CHF (congestive heart failure)     Dilated cardiomyopathy 1/10/2018    Disorder of kidney and ureter     CKD    Gout     HTN (hypertension)     Ventricular tachycardia (paroxysmal)      Past Surgical History:   Procedure Laterality Date    CARDIAC CATHETERIZATION  Dec. 2012    CARDIAC DEFIBRILLATOR PLACEMENT Left     CRRT-D     Family History     Problem Relation (Age of Onset)    Cancer Sister (54)    Coronary artery disease Father    Diabetes Father    Heart disease Father    Hypertension Father    No Known Problems Mother, Brother        Social History Main Topics    Smoking status: Former Smoker     Packs/day: 1.00     Years: 31.00     Types: Cigarettes      Quit date: 1/12/2018    Smokeless tobacco: Never Used      Comment: pt is quiting on his own - pt stated not qualified for program; 2/16 pt  quit on his own    Alcohol use No      Comment: one fifth of gin or rum/week    Drug use: No    Sexual activity: Yes     Partners: Female     Birth control/ protection: None      Comment: 10/5/17  with same partner 7 years      Review of Systems   Constitutional: Negative for activity change, appetite change, chills, fatigue and fever.   Respiratory: Negative for cough, chest tightness, shortness of breath and wheezing.    Cardiovascular: Positive for leg swelling. Negative for chest pain.   Gastrointestinal: Negative for abdominal distention, abdominal pain, anal bleeding, blood in stool, constipation, diarrhea, nausea, rectal pain and vomiting.   Genitourinary: Negative for difficulty urinating, enuresis, flank pain, frequency, genital sores and hematuria.   Musculoskeletal: Negative for back pain, gait problem and joint swelling.   Skin: Negative.  Negative for color change.   Neurological: Negative for dizziness, syncope and numbness.   Psychiatric/Behavioral: Negative for agitation, confusion, decreased concentration, dysphoric mood and hallucinations. The patient is not nervous/anxious and is not hyperactive.      Objective:     Vital Signs (Most Recent):  Temp: 98.5 °F (36.9 °C) (03/05/18 0727)  Pulse: 89 (03/05/18 0727)  Resp: 16 (03/05/18 0727)  BP: 125/84 (03/05/18 0727)  SpO2: 97 % (03/05/18 0727) Vital Signs (24h Range):  Temp:  [97.8 °F (36.6 °C)-99.2 °F (37.3 °C)] 98.5 °F (36.9 °C)  Pulse:  [72-99] 89  Resp:  [16-18] 16  SpO2:  [94 %-98 %] 97 %  BP: (106-131)/(68-84) 125/84     Weight: 118.1 kg (260 lb 5.8 oz)  Body mass index is 34.35 kg/m².    Physical Exam   Constitutional: He is oriented to person, place, and time. He appears well-developed and well-nourished. No distress.   Cardiovascular: Normal rate, regular rhythm and normal heart sounds.     Pulmonary/Chest: Effort normal and breath sounds normal. No respiratory distress. He has no wheezes. He has no rales.   Abdominal: Soft. He exhibits no distension and no mass. There is no tenderness. There is no guarding.   Musculoskeletal: Normal range of motion.   Neurological: He is alert and oriented to person, place, and time.   Skin: Skin is warm and dry.   Psychiatric: He has a normal mood and affect. His behavior is normal. Judgment and thought content normal.   Nursing note and vitals reviewed.        Significant Labs:  BMP (Last 3 Results):   Recent Labs  Lab 03/01/18  2243  03/02/18  1537  03/04/18  0530 03/04/18  1644 03/05/18  0520 03/05/18  0820   GLU  --   < > 76  < > 86 70 95  --    NA  --   < > 137  < > 135* 136 138  --    K 3.4*  < > 3.9  < > 3.6 4.2 3.7  --    CL  --   < > 98  < > 97 98 100  --    CO2  --   < > 28  < > 27 28 28  --    BUN  --   < > 48*  < > 29* 28* 28*  --    CREATININE  --   < > 2.3*  < > 1.8* 1.9* 1.9*  --    CALCIUM  --   < > 10.0  < > 9.8 10.0 9.8  --    MG 2.1  --  2.1  --   --   --   --  2.0   < > = values in this interval not displayed.  CBC (Last 3 Results):   Recent Labs  Lab 03/03/18  0248 03/04/18  0530 03/05/18  0520   WBC 6.00  6.00 4.95 5.07   RBC 4.80  4.80 4.81 4.68   HGB 13.1*  13.1* 13.4* 13.2*   HCT 40.3  40.3 41.5 40.0     212 211 199   MCV 84  84 86 86   MCH 27.3  27.3 27.9 28.2   MCHC 32.5  32.5 32.3 33.0       Significant Diagnostics:  None

## 2018-03-05 NOTE — CONSULTS
[facility]  Colorectal Surgery  Consult Note    Patient Name: Tim Richards  MRN: [id number]  Admission Date: 3/1/2018  Hospital Length of Stay: 4 days  Attending Physician: Amparo Lopez MD  Primary Care Provider: Ja Méndez MD    Inpatient consult to Colorectal Surgery  Consult performed by: MCKINLEY ARRIOLA  Consult ordered by: JOSE FAN        Subjective:     Chief Complaint/Reason for Admission: CHF    History of Present Illness:  Mr. Richards is a 52 y.o. year old Black or  male who has presents as f/u March 1from hospitalization for ADHF after presenting to clinic 1/10/17 as initial consult. advanced surgical options (LVAD/OHT).    This 51 yo BM has had CHF since 2011 at which time an angiogram did not demonstrate any CAD.  He is on amiodarone for VT. He was admitted from 1/12-1/17/18 (see d/c summary) where he was treated for ADHF and initiation of w/u for advanced options. RHC during that admission showed RA 17 and PCWP 35 as well as severely reduced CI 1.6. Though not optimized, he was discharged per his request so as not to lose his job/insurance--see Dr. Hadley's attestation for full details on d/c summary. Since d/c, his Scr has risen from 2.0 on discharge to 2.8 (1/23/18). His BNP had declined to 1040.  He presents today for scheduled hospital f/u. He was admitted for dobitamine gtt,   CRS consulted for colonoscopy as part of workup for heart transplant, pt denies ever having colonoscopy  No hx of colon or rectal surgery  No family hx of CRC  Has bm daily, denies abd or rectal pain, no blood per rectum         PTA Medications   Medication Sig    allopurinol (ZYLOPRIM) 100 MG tablet TAKE 1 TABLET (100 MG TOTAL) BY MOUTH 3 (THREE) TIMES DAILY.    amiodarone (PACERONE) 200 MG Tab TAKE 1 TABLET (200 MG TOTAL) BY MOUTH ONCE DAILY.    aspirin (ECOTRIN) 81 MG EC tablet Take 81 mg by mouth. 1 Tablet, Delayed Release (E.C.) Oral Every day     bumetanide (BUMEX) 2 MG tablet Take 1 tablet (2 mg total) by mouth 2 (two) times daily.    losartan (COZAAR) 25 MG tablet Take 25 mg by mouth once daily.    potassium chloride SA (K-DUR,KLOR-CON) 10 MEQ tablet Take 2 tablets (20 mEq total) by mouth 2 (two) times daily.    spironolactone (ALDACTONE) 25 MG tablet Take 1 tablet (25 mg total) by mouth once daily.    ERGOCALCIFEROL, VITAMIN D2, (VITAMIN D ORAL) Take by mouth once daily.    metOLazone (ZAROXOLYN) 2.5 MG tablet Take 2.5 mg by mouth every other day.    metoprolol succinate (TOPROL-XL) 25 MG 24 hr tablet Take 0.5 tablets (12.5 mg total) by mouth once daily.    valsartan (DIOVAN) 80 MG tablet Take 0.5 tablets (40 mg total) by mouth 2 (two) times daily.    VITAMIN E ACETATE (VITAMIN E ORAL) Take by mouth once daily.       Review of patient's allergies indicates:   Allergen Reactions    Lisinopril Anaphylaxis       Past Medical History:   Diagnosis Date    CHF (congestive heart failure)     Dilated cardiomyopathy 1/10/2018    Disorder of kidney and ureter     CKD    Gout     HTN (hypertension)     Ventricular tachycardia (paroxysmal)      Past Surgical History:   Procedure Laterality Date    CARDIAC CATHETERIZATION  Dec. 2012    CARDIAC DEFIBRILLATOR PLACEMENT Left     CRRT-D     Family History     Problem Relation (Age of Onset)    Cancer Sister (54)    Coronary artery disease Father    Diabetes Father    Heart disease Father    Hypertension Father    No Known Problems Mother, Brother        Social History Main Topics    Smoking status: Former Smoker     Packs/day: 1.00     Years: 31.00     Types: Cigarettes     Quit date: 1/12/2018    Smokeless tobacco: Never Used      Comment: pt is quiting on his own - pt stated not qualified for program; 2/16 pt  quit on his own    Alcohol use No      Comment: one fifth of gin or rum/week    Drug use: No    Sexual activity: Yes     Partners: Female     Birth control/ protection: None      Comment:  10/5/17  with same partner 7 years      Review of Systems   Constitutional: Negative for activity change, appetite change, chills, fatigue and fever.   Respiratory: Negative for cough, chest tightness, shortness of breath and wheezing.    Cardiovascular: Positive for leg swelling. Negative for chest pain.   Gastrointestinal: Negative for abdominal distention, abdominal pain, anal bleeding, blood in stool, constipation, diarrhea, nausea, rectal pain and vomiting.   Genitourinary: Negative for difficulty urinating, enuresis, flank pain, frequency, genital sores and hematuria.   Musculoskeletal: Negative for back pain, gait problem and joint swelling.   Skin: Negative.  Negative for color change.   Neurological: Negative for dizziness, syncope and numbness.   Psychiatric/Behavioral: Negative for agitation, confusion, decreased concentration, dysphoric mood and hallucinations. The patient is not nervous/anxious and is not hyperactive.      Objective:     Vital Signs (Most Recent):  Temp: 98.5 °F (36.9 °C) (03/05/18 0727)  Pulse: 89 (03/05/18 0727)  Resp: 16 (03/05/18 0727)  BP: 125/84 (03/05/18 0727)  SpO2: 97 % (03/05/18 0727) Vital Signs (24h Range):  Temp:  [97.8 °F (36.6 °C)-99.2 °F (37.3 °C)] 98.5 °F (36.9 °C)  Pulse:  [72-99] 89  Resp:  [16-18] 16  SpO2:  [94 %-98 %] 97 %  BP: (106-131)/(68-84) 125/84     Weight: 118.1 kg (260 lb 5.8 oz)  Body mass index is 34.35 kg/m².    Physical Exam   Constitutional: He is oriented to person, place, and time. He appears well-developed and well-nourished. No distress.   Cardiovascular: Normal rate, regular rhythm and normal heart sounds.    Pulmonary/Chest: Effort normal and breath sounds normal. No respiratory distress. He has no wheezes. He has no rales.   Abdominal: Soft. He exhibits no distension and no mass. There is no tenderness. There is no guarding.   Musculoskeletal: Normal range of motion.   Neurological: He is alert and oriented to person, place, and time.    Skin: Skin is warm and dry.   Psychiatric: He has a normal mood and affect. His behavior is normal. Judgment and thought content normal.   Nursing note and vitals reviewed.        Significant Labs:  BMP (Last 3 Results):   Recent Labs  Lab 03/01/18  2243  03/02/18  1537  03/04/18  0530 03/04/18  1644 03/05/18  0520 03/05/18  0820   GLU  --   < > 76  < > 86 70 95  --    NA  --   < > 137  < > 135* 136 138  --    K 3.4*  < > 3.9  < > 3.6 4.2 3.7  --    CL  --   < > 98  < > 97 98 100  --    CO2  --   < > 28  < > 27 28 28  --    BUN  --   < > 48*  < > 29* 28* 28*  --    CREATININE  --   < > 2.3*  < > 1.8* 1.9* 1.9*  --    CALCIUM  --   < > 10.0  < > 9.8 10.0 9.8  --    MG 2.1  --  2.1  --   --   --   --  2.0   < > = values in this interval not displayed.  CBC (Last 3 Results):   Recent Labs  Lab 03/03/18  0248 03/04/18  0530 03/05/18  0520   WBC 6.00  6.00 4.95 5.07   RBC 4.80  4.80 4.81 4.68   HGB 13.1*  13.1* 13.4* 13.2*   HCT 40.3  40.3 41.5 40.0     212 211 199   MCV 84  84 86 86   MCH 27.3  27.3 27.9 28.2   MCHC 32.5  32.5 32.3 33.0       Significant Diagnostics:  None    Assessment/Plan:     Screening for colon cancer    52 year old male with CHF being worked up for a heart transplant  Schedule colonoscopy Tuesday  Prep Monday  Cld, NPO after MN on Monday nite  Above discussed with Dr. Bone             Thank you for your consult.     Patrizia Amos NP  Colorectal Surgery  [facility]

## 2018-03-05 NOTE — PROGRESS NOTES
Spoke with Jack Gustafson about Devendra timing. Ordered to give at 4pm instead of 2pm. Will continue to monitor.

## 2018-03-05 NOTE — PLAN OF CARE
Problem: Patient Care Overview  Goal: Plan of Care Review  Outcome: Ongoing (interventions implemented as appropriate)  Plan of care discussed with patient. Patient is free of fall/trauma/injury.  running at prescribed rate. Lasix gtt stopped :lasix push started. Central line to be removed once peripheral IVS started. Denies CP, SOB, or pain/discomfort. All questions addressed. Will continue to monitor

## 2018-03-05 NOTE — ASSESSMENT & PLAN NOTE
52 year old male with CHF being worked up for a heart transplant  Schedule colonoscopy Tuesday  Prep Monday  Cld, NPO after MN on Monday nite  Above discussed with Dr. Bone

## 2018-03-05 NOTE — SUBJECTIVE & OBJECTIVE
Interval History: No complaints overnight. Plan for c-scope tomorrow, CRS consulted, will see patient today and place orders for prep.    Continuous Infusions:   DOBUTamine 5 mcg/kg/min (03/04/18 1824)     Scheduled Meds:   allopurinol  100 mg Oral TID    amiodarone  200 mg Oral Daily    aspirin  81 mg Oral Daily    docusate sodium  100 mg Oral TID    furosemide  40 mg Intravenous BID    polyethylene glycol  4,000 mL Oral Once    sodium chloride 0.9%  3 mL Intravenous Q8H     PRN Meds:traMADol    Review of patient's allergies indicates:   Allergen Reactions    Lisinopril Anaphylaxis     Objective:     Vital Signs (Most Recent):  Temp: 98.5 °F (36.9 °C) (03/05/18 0727)  Pulse: 89 (03/05/18 0727)  Resp: 16 (03/05/18 0727)  BP: 125/84 (03/05/18 0727)  SpO2: 97 % (03/05/18 0727) Vital Signs (24h Range):  Temp:  [97.8 °F (36.6 °C)-99.2 °F (37.3 °C)] 98.5 °F (36.9 °C)  Pulse:  [72-99] 89  Resp:  [16-18] 16  SpO2:  [94 %-98 %] 97 %  BP: (106-131)/(68-84) 125/84     Patient Vitals for the past 72 hrs (Last 3 readings):   Weight   03/05/18 0700 118.1 kg (260 lb 5.8 oz)   03/04/18 0700 117.7 kg (259 lb 7.7 oz)     Body mass index is 34.35 kg/m².      Intake/Output Summary (Last 24 hours) at 03/05/18 1009  Last data filed at 03/05/18 0600   Gross per 24 hour   Intake             1564 ml   Output             1775 ml   Net             -211 ml       Hemodynamic Parameters:         Physical Exam   Constitutional: He appears well-developed and well-nourished. No distress.   HENT:   Head: Normocephalic and atraumatic.   Eyes: Pupils are equal, round, and reactive to light.   Neck: Normal range of motion. No JVD (at clavicle) present.   Cardiovascular: Normal rate.  Exam reveals no friction rub.    No murmur heard.  Pulmonary/Chest: Effort normal. No respiratory distress. He has no wheezes.   Abdominal: Soft. He exhibits no distension.   Musculoskeletal: Normal range of motion. He exhibits no edema.   Neurological: He is  alert.   Skin: Skin is warm. Capillary refill takes 2 to 3 seconds. He is not diaphoretic. No erythema.   Psychiatric: He has a normal mood and affect. His behavior is normal. Judgment and thought content normal.     Significant Labs:  CBC:    Recent Labs  Lab 03/03/18  0248 03/04/18  0530 03/05/18  0520   WBC 6.00  6.00 4.95 5.07   RBC 4.80  4.80 4.81 4.68   HGB 13.1*  13.1* 13.4* 13.2*   HCT 40.3  40.3 41.5 40.0     212 211 199   MCV 84  84 86 86   MCH 27.3  27.3 27.9 28.2   MCHC 32.5  32.5 32.3 33.0     BNP:    Recent Labs  Lab 03/01/18  0816 03/05/18  0820   * 1,270*     CMP:    Recent Labs  Lab 03/04/18  0530 03/04/18  1644 03/05/18  0520   GLU 86 70 95   CALCIUM 9.8 10.0 9.8   ALBUMIN 3.9 3.9 3.8   PROT 7.7 7.9 7.5   * 136 138   K 3.6 4.2 3.7   CO2 27 28 28   CL 97 98 100   BUN 29* 28* 28*   CREATININE 1.8* 1.9* 1.9*   ALKPHOS 96 104 110   ALT 18 19 19   AST 22 23 23   BILITOT 1.0 0.8 0.7      Coagulation:     Recent Labs  Lab 03/01/18  1721   INR 1.1     LDH:  No results for input(s): LDH in the last 72 hours.  Microbiology:  Microbiology Results (last 7 days)     ** No results found for the last 168 hours. **          I have reviewed all pertinent labs within the past 24 hours.    Estimated Creatinine Clearance: 61.9 mL/min (A) (based on SCr of 1.9 mg/dL (H)).    Diagnostic Results:  I have reviewed and interpreted all pertinent imaging results/findings within the past 24 hours.

## 2018-03-05 NOTE — NURSING TRANSFER
Nursing Transfer Note      3/5/2018     Transfer From: Echo    Transfer via wheelchair    Transfer with cardiac monitoring    Transported by transport    Medicines sent:     Chart send with patient: No    Notified: spouse    Patient reassessed at: 3/5/2018 1545     Upon arrival to floor: cardiac monitor applied, patient oriented to room, call bell in reach and bed in lowest position

## 2018-03-05 NOTE — PROGRESS NOTES
ICD interrogation completed, device fxn WNL.  Appropriate therapy for monomorphic VT noted 2/25/18 (ATP accelerated CL, then shock).  No changes.

## 2018-03-05 NOTE — NURSING TRANSFER
Nursing Transfer Note      3/5/2018     Transfer To: Echo    Transfer via wheelchair    Transfer with cardiac monitoring    Transported by transport    Medicines sent: Dobutamine    Chart send with patient: No

## 2018-03-05 NOTE — CONSULTS
[facility]  Hematology/Oncology  Consult Note    Patient Name: Tim Richards  MRN: [id number]  Admission Date: 3/1/2018  Hospital Length of Stay: 4 days  Code Status: Full Code   Attending Provider: Amparo Lopez MD  Consulting Provider: Jeannette Robles MD  Primary Care Physician: aJ Méndez MD  Principal Problem:Acute decompensated heart failure    Inpatient consult to Hematology/Oncology  Consult performed by: JEANNETTE ROBLES  Consult ordered by: JOSE FAN.        Subjective:     HPI: 52 year old phoebe with history of non ischemic cardiomyopathy since 2011 who has been admitted for acute on chronic renal failure and further workup for potential LVD/OHT. Patient had coagulation blood work drawn on 2/7/18 which revealed a negative DRRVT but positive hex phospholipid neutralizing test. The rest of his hypercaog studies ordered were negative.   Has no personal history of blood clots.     Oncology Treatment Plan:   [No treatment plan]    Medications:  Continuous Infusions:   DOBUTamine 5 mcg/kg/min (03/05/18 1238)     Scheduled Meds:   allopurinol  100 mg Oral TID    amiodarone  200 mg Oral Daily    aspirin  81 mg Oral Daily    docusate sodium  100 mg Oral TID    enoxaparin  40 mg Subcutaneous Daily    furosemide  40 mg Intravenous BID    polyethylene glycol  4,000 mL Oral Once    sodium chloride 0.9%  3 mL Intravenous Q8H     PRN Meds:traMADol     Review of patient's allergies indicates:   Allergen Reactions    Lisinopril Anaphylaxis        Past Medical History:   Diagnosis Date    CHF (congestive heart failure)     Dilated cardiomyopathy 1/10/2018    Disorder of kidney and ureter     CKD    Gout     HTN (hypertension)     Ventricular tachycardia (paroxysmal)      Past Surgical History:   Procedure Laterality Date    CARDIAC CATHETERIZATION  Dec. 2012    CARDIAC DEFIBRILLATOR PLACEMENT Left     CRRT-D     Family History     Problem Relation  (Age of Onset)    Cancer Sister (54)    Coronary artery disease Father    Diabetes Father    Heart disease Father    Hypertension Father    No Known Problems Mother, Brother        Social History Main Topics    Smoking status: Former Smoker     Packs/day: 1.00     Years: 31.00     Types: Cigarettes     Quit date: 1/12/2018    Smokeless tobacco: Never Used      Comment: pt is quiting on his own - pt stated not qualified for program; 2/16 pt  quit on his own    Alcohol use No      Comment: one fifth of gin or rum/week    Drug use: No    Sexual activity: Yes     Partners: Female     Birth control/ protection: None      Comment: 10/5/17  with same partner 7 years        Review of Systems   Constitutional: Negative for diaphoresis and fever.   HENT: Negative for congestion and trouble swallowing.    Eyes: Negative for visual disturbance.   Respiratory: Positive for shortness of breath. Negative for cough.    Cardiovascular: Negative for chest pain.   Gastrointestinal: Negative for abdominal distention and abdominal pain.   Genitourinary: Negative for flank pain.   Musculoskeletal: Negative for gait problem.   Skin: Negative for color change.   Neurological: Negative for light-headedness and numbness.   Hematological: Negative for adenopathy. Does not bruise/bleed easily.   Psychiatric/Behavioral: Negative for agitation and behavioral problems.     Objective:     Vital Signs (Most Recent):  Temp: 98.3 °F (36.8 °C) (03/05/18 1218)  Pulse: 95 (03/05/18 1400)  Resp: 18 (03/05/18 1218)  BP: 106/69 (03/05/18 1218)  SpO2: 97 % (03/05/18 1218) Vital Signs (24h Range):  Temp:  [97.8 °F (36.6 °C)-98.9 °F (37.2 °C)] 98.3 °F (36.8 °C)  Pulse:  [72-98] 95  Resp:  [16-18] 18  SpO2:  [94 %-98 %] 97 %  BP: (106-131)/(68-84) 106/69     Weight: 118.1 kg (260 lb 5.8 oz)  Body mass index is 34.35 kg/m².  Body surface area is 2.47 meters squared.      Intake/Output Summary (Last 24 hours) at 03/05/18 1506  Last data filed at  03/05/18 1238   Gross per 24 hour   Intake              724 ml   Output             2195 ml   Net            -1471 ml       Physical Exam   Constitutional: He is oriented to person, place, and time. He appears well-developed.   HENT:   Mouth/Throat: Oropharynx is clear and moist.   Eyes: Conjunctivae are normal. Pupils are equal, round, and reactive to light.   Cardiovascular: Normal rate.    Pulmonary/Chest: Effort normal and breath sounds normal.   Abdominal: Soft. Bowel sounds are normal.   Lymphadenopathy:     He has no cervical adenopathy.   Neurological: He is alert and oriented to person, place, and time.   Skin: Skin is warm and dry.       Significant Labs:   CBC:   Recent Labs  Lab 03/04/18  0530 03/05/18  0520   WBC 4.95 5.07   HGB 13.4* 13.2*   HCT 41.5 40.0    199   , CMP:   Recent Labs  Lab 03/04/18  0530 03/04/18  1644 03/05/18  0520   * 136 138   K 3.6 4.2 3.7   CL 97 98 100   CO2 27 28 28   GLU 86 70 95   BUN 29* 28* 28*   CREATININE 1.8* 1.9* 1.9*   CALCIUM 9.8 10.0 9.8   PROT 7.7 7.9 7.5   ALBUMIN 3.9 3.9 3.8   BILITOT 1.0 0.8 0.7   ALKPHOS 96 104 110   AST 22 23 23   ALT 18 19 19   ANIONGAP 11 10 10   EGFRNONAA 42.6* 39.9* 39.9*    and Coagulation: No results for input(s): PT, INR, APTT in the last 48 hours.    Diagnostic Results:  I have reviewed all pertinent imaging results/findings within the past 24 hours.    Assessment/Plan:     Active Diagnoses:    Diagnosis Date Noted POA    PRINCIPAL PROBLEM:  Acute decompensated heart failure [I50.9] 01/12/2018 Yes    Screening for colon cancer [Z12.11] 03/05/2018 Not Applicable    CKD (chronic kidney disease), stage III [N18.3] 01/12/2018 Yes    PVT (paroxysmal ventricular tachycardia) [I47.2] 01/10/2018 Yes    Acute on chronic combined systolic and diastolic heart failure [I50.43] 09/23/2015 Yes    Biventricular implantable cardioverter-defibrillator in situ [Z95.810] 11/20/2014 Yes    Pulmonary nodule seen on imaging study [R91.1]  05/24/2014 Yes    Chronic systolic heart failure [I50.22]  Yes      Problems Resolved During this Admission:    Diagnosis Date Noted Date Resolved POA       A/P 53 y/o with nonischemic cardiomyopathy has no history of clotting. + hex phos test with negative DRRVT in this setting is not of clinical significance.  Will check beta 2 glycoprotein for completion and if negative, no further work up warranted at this time.     Thank you for your consult.     Caitlyn Strong MD  Hematology/Oncology  [facility]

## 2018-03-05 NOTE — ASSESSMENT & PLAN NOTE
- cr 1.9 today  - Best course is to admit for accelerated evaluation and consideration for IABP to see if kidneys are reversible and discuss LVAD and txp (CTS to see patient today, discussed with CTS midlevel)

## 2018-03-05 NOTE — ASSESSMENT & PLAN NOTE
- Cr 2.8 in clinic, down to 1.9 now s/p diuresis and initiation of inotropes  - Consider removing central line due to infection risk  - JVD at clavicle lying flat today, will d/c lasix ggt and start IV push lasix (CVP 6 yesterday)  - LVAD may not be a viable option with his VT though in his case though this may have been ADHF-related VT and not scar-based. However, we must improve his renal function for that to be an option as well. CO/CI moderately-severely reduced by recent RHC but VT makes inotropes very difficult. CTS to see patient today to weigh in.  - will hold off on GDMT for now as patient has potential for VAD this Admit

## 2018-03-05 NOTE — ANESTHESIA PREPROCEDURE EVALUATION
[facility]  Anesthesia Pre-Operative Evaluation         Patient Name: Tim Richards  YOB: 1965  MRN: [id number]    SUBJECTIVE:     Pre-operative evaluation for Procedure(s) (LRB):  COLONOSCOPY (N/A)     03/05/2018    Tim Richards is a 52 y.o. male w/ a significant PMHx of difficult intubation (see prev airway documentation), CHF (EF 10%, PASP of 47), MI, HTN, CKD, s/p CRT and ICD for VT, and EtOH use. He was recently admitted for acute decompensated heart failure and paroxysmal ventricular tachycardia. Patient now presents as a follow up from his hospitalization and to for evaluation of LVAD/OHT. Patient now presents for the above procedure(s) as part of the evaluation process.         LDA:        Percutaneous Central Line Insertion/Assessment - triple lumen  03/01/18 1800 right internal jugular (Active)   Dressing biopatch in place 3/4/2018  8:00 PM   Securement catheter securement device utilized 3/4/2018  7:49 AM   Additional Site Signs no pain;no edema 3/4/2018  7:49 AM   Distal Patency/Care infusing 3/4/2018  7:49 AM   Medial Patency/Care normal saline locked 3/4/2018  7:49 AM   Proximal Patency/Care infusing 3/4/2018  7:49 AM   Dressing Change Due 03/08/18 3/4/2018  7:49 AM   Daily Line Review Performed 3/4/2018  8:00 PM   Number of days: 3       Prev airway:   Placement Date: 10/31/14; Placement Time: 1205; Method of Intubation: Direct laryngoscopy, Glidescope; Inserted by: Anesthesia MD; Airway Device: Endotracheal Tube; Mask Ventilation: Mod Diff - oral; Intubated: Postinduction; Blade: Barrios #2; Airway Device Size: 7.5; Style: Cuffed; Cuff Inflation: Minimal occlusive pressure; Inflation Amount: 5.5; Placement Verified By: Auscultation, Capnometry, Fiberoptic bronchoscopic inspection; Grade: Grade IV; Complicating Factors: Large/Floppy epiglottis, Anterior larynx;    Drips:    DOBUTamine 5 mcg/kg/min (03/04/18 3767)       Patient Active Problem List   Diagnosis     Chronic systolic heart failure    Cigarette nicotine dependence without complication    Alcohol abuse    Pulmonary nodule seen on imaging study    Biventricular implantable cardioverter-defibrillator in situ    Respiratory distress    Acute on chronic combined systolic and diastolic heart failure    Fluid overload    Syncope    Elevated troponin    PVT (paroxysmal ventricular tachycardia)    High risk medications (amiodarone) long-term use    Dilated cardiomyopathy    Acute decompensated heart failure    CKD (chronic kidney disease), stage III    Hypertensive cardiovascular-renal disease, stage 1-4 or unspecified chronic kidney disease, with heart failure    Organ transplant candidate    Screening for colon cancer       Review of patient's allergies indicates:   Allergen Reactions    Lisinopril Anaphylaxis       Current Inpatient Medications:   allopurinol  100 mg Oral TID    amiodarone  200 mg Oral Daily    aspirin  81 mg Oral Daily    docusate sodium  100 mg Oral TID    furosemide  40 mg Intravenous BID    polyethylene glycol  4,000 mL Oral Once    sodium chloride 0.9%  3 mL Intravenous Q8H       No current facility-administered medications on file prior to encounter.      Current Outpatient Prescriptions on File Prior to Encounter   Medication Sig Dispense Refill    allopurinol (ZYLOPRIM) 100 MG tablet TAKE 1 TABLET (100 MG TOTAL) BY MOUTH 3 (THREE) TIMES DAILY. 90 tablet 1    amiodarone (PACERONE) 200 MG Tab TAKE 1 TABLET (200 MG TOTAL) BY MOUTH ONCE DAILY. 30 tablet 7    aspirin (ECOTRIN) 81 MG EC tablet Take 81 mg by mouth. 1 Tablet, Delayed Release (E.C.) Oral Every day      bumetanide (BUMEX) 2 MG tablet Take 1 tablet (2 mg total) by mouth 2 (two) times daily. 120 tablet 2    losartan (COZAAR) 25 MG tablet Take 25 mg by mouth once daily.  1    potassium chloride SA (K-DUR,KLOR-CON) 10 MEQ tablet Take 2 tablets (20 mEq total) by mouth 2 (two) times daily. 240 tablet 2     spironolactone (ALDACTONE) 25 MG tablet Take 1 tablet (25 mg total) by mouth once daily. 30 tablet 0    ERGOCALCIFEROL, VITAMIN D2, (VITAMIN D ORAL) Take by mouth once daily.      metOLazone (ZAROXOLYN) 2.5 MG tablet Take 2.5 mg by mouth every other day.  3    metoprolol succinate (TOPROL-XL) 25 MG 24 hr tablet Take 0.5 tablets (12.5 mg total) by mouth once daily. 30 tablet 2    valsartan (DIOVAN) 80 MG tablet Take 0.5 tablets (40 mg total) by mouth 2 (two) times daily. 30 tablet 1    VITAMIN E ACETATE (VITAMIN E ORAL) Take by mouth once daily.         Past Surgical History:   Procedure Laterality Date    CARDIAC CATHETERIZATION  Dec. 2012    CARDIAC DEFIBRILLATOR PLACEMENT Left     CRRT-D       Social History     Social History    Marital status:      Spouse name: N/A    Number of children: N/A    Years of education: N/A     Occupational History     [employer]      Social History Main Topics    Smoking status: Former Smoker     Packs/day: 1.00     Years: 31.00     Types: Cigarettes     Quit date: 1/12/2018    Smokeless tobacco: Never Used      Comment: pt is quiting on his own - pt stated not qualified for program; 2/16 pt  quit on his own    Alcohol use No      Comment: one fifth of gin or rum/week    Drug use: No    Sexual activity: Yes     Partners: Female     Birth control/ protection: None      Comment: 10/5/17  with same partner 7 years      Other Topics Concern    Not on file     Social History Narrative    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       OBJECTIVE:     Vital Signs Range (Last 24H):  Temp:  [36.6 °C (97.8 °F)-37.3 °C (99.2 °F)]   Pulse:  [72-99]   Resp:  [16-18]   BP: (106-131)/(68-84)   SpO2:  [94 %-98 %]       CBC:   Recent Labs      03/04/18   0530  03/05/18   0520   WBC  4.95  5.07   RBC  4.81  4.68   HGB  13.4*  13.2*   HCT  41.5  40.0   PLT  211  199   MCV  86  86   MCH  27.9  28.2   MCHC  32.3  33.0       CMP:   Recent Labs      03/02/18   1537    03/04/18   1644  03/05/18   0520  03/05/18   0820   NA  137   < >  136  138   --    K  3.9   < >  4.2  3.7   --    CL  98   < >  98  100   --    CO2  28   < >  28  28   --    BUN  48*   < >  28*  28*   --    CREATININE  2.3*   < >  1.9*  1.9*   --    GLU  76   < >  70  95   --    MG  2.1   --    --    --   2.0   CALCIUM  10.0   < >  10.0  9.8   --    ALBUMIN  3.9   < >  3.9  3.8   --    PROT  7.8   < >  7.9  7.5   --    ALKPHOS  98   < >  104  110   --    ALT  19   < >  19  19   --    AST  24   < >  23  23   --    BILITOT  0.7   < >  0.8  0.7   --     < > = values in this interval not displayed.       INR:  No results for input(s): PT, INR, PROTIME, APTT in the last 72 hours.    Diagnostic Studies:     EKG (01/12/18):  Test Reason : I50.9  Vent. Rate : 075 BPM     Atrial Rate : 074 BPM  P-R Int : 176 ms          QRS Dur : 164 ms  QT Int : 480 ms       P-R-T Axes : 050 193 017 degrees  QTc Int : 536 ms    Atrial-sensed ventricular-paced rhythm with frequent Premature ventricular  complexes  Abnormal ECG  When compared with ECG of 21-DEC-2017 17:42,  Electronic ventricular pacemaker has replaced Sinus rhythm  Confirmed by Jaquelin CHAMBERLAIN, Duc Tapia ([id number]) on 1/13/2018 5:51:35 PM    2D ECHO:  Results for orders placed or performed during the hospital encounter of 01/12/18   2D echo with color flow doppler   Result Value Ref Range    EF 10 (A) 55 - 65    Mitral Valve Regurgitation SEVERE (A)     Aortic Valve Regurgitation TRIVIAL     Est. PA Systolic Pressure 47.44 (A)     Tricuspid Valve Regurgitation MILD          ASSESSMENT/PLAN:     Anesthesia Evaluation    I have reviewed the Patient Summary Reports.    I have reviewed the Nursing Notes.   I have reviewed the Medications.     Review of Systems  Anesthesia Hx:  Hx of Anesthetic complications  History of prior surgery of interest to airway management or planning: Previous anesthesia: General Airway issues documented on chart review include mask, difficult,  difficult direct laryngoscopy, required fiberoptic intubation  Denies Family Hx of Anesthesia complications.  Personal Hx of Anesthesia complications  Difficult Intubation   Social:  Smoker    Hematology/Oncology:        Denies Current/Recent Cancer   EENT/Dental:   denies chronic allergic rhinitis   Cardiovascular:   Exercise tolerance: poor Pacemaker Hypertension Valvular problems/Murmurs (mild MR), MR Past MI Denies CAD.    Denies CABG/stent.  Denies Dysrhythmias.  CHF ECG has been reviewed.    Pulmonary:   Denies Pneumonia Denies Asthma.  Denies Shortness of breath.  Denies Recent URI. Pulmonary nodule   Renal/:   Chronic Renal Disease (creatinine=1.3 (baseline)), CRI    Hepatic/GI:  Hepatic/GI Normal Denies PUD.  Denies Hiatal Hernia.  Denies GERD.    Neurological:  Neurology Normal Denies TIA.  Denies CVA. Denies Seizures.    Endocrine:  Endocrine Normal Denies Diabetes.    Psych:   Denies Psychiatric History.          Physical Exam  General:  Well nourished    Airway/Jaw/Neck:  Airway Findings: Mouth Opening: Normal Tongue: Normal  General Airway Assessment: Adult  Mallampati: I  TM Distance: Normal, at least 6 cm  Jaw/Neck Findings:  Neck ROM: Normal ROM  Neck Findings: Normal M2-3, good airway opening, no loose dentition per pt, FROM, short thick neck    Dental:  Dental Findings: In tact, Periodontal disease, Mild   Chest/Lungs:  Chest/Lungs Findings: Clear to auscultation, Normal Respiratory Rate     Heart/Vascular:  Heart Findings: Rate: Normal  Rhythm: Regular Rhythm  Sounds: Normal  Vascular Findings: Normal    Abdomen:  Abdomen Findings: Normal    Musculoskeletal:  Musculoskeletal Findings: Normal   Skin:  Skin Findings: Normal    Mental Status:  Mental Status Findings:  Cooperative, Alert and Oriented         Anesthesia Plan  Type of Anesthesia, risks & benefits discussed:  Anesthesia Type:  general, MAC  Patient's Preference:   Intra-op Monitoring Plan: standard ASA monitors and arterial  line  Intra-op Monitoring Plan Comments:   Post Op Pain Control Plan: per primary service following discharge from PACU  Post Op Pain Control Plan Comments:   Induction:   IV  Beta Blocker:  Patient is not currently on a Beta-Blocker (No further documentation required).       Informed Consent: Patient understands risks and agrees with Anesthesia plan.  Questions answered. Anesthesia consent signed with patient.  ASA Score: 4     Day of Surgery Review of History & Physical:    H&P update referred to the provider.         Ready For Surgery From Anesthesia Perspective.

## 2018-03-05 NOTE — PROGRESS NOTES
Pt AAOX3. No family at bedside. Forms signed but awaiting wife to arrive to go over further education.

## 2018-03-05 NOTE — PROGRESS NOTES
Clarified with Jack Gustafson about patient's Diet. He is NPO for potential C- scope tomorrow but it is ok to give medications. Also notified her that patient had told night shift nurse. He was having gout but the allopurinol was not working. Stated she will assess with the patient when she sees him. Will continue to monitor.

## 2018-03-06 ENCOUNTER — DOCUMENTATION ONLY (OUTPATIENT)
Dept: TRANSFUSION MEDICINE | Facility: HOSPITAL | Age: 53
End: 2018-03-06

## 2018-03-06 ENCOUNTER — ANESTHESIA (OUTPATIENT)
Dept: ENDOSCOPY | Facility: HOSPITAL | Age: 53
DRG: 001 | End: 2018-03-06
Payer: COMMERCIAL

## 2018-03-06 ENCOUNTER — SOCIAL WORK (OUTPATIENT)
Dept: TRANSPLANT | Facility: CLINIC | Age: 53
End: 2018-03-06

## 2018-03-06 LAB
ALBUMIN SERPL BCP-MCNC: 3.5 G/DL
ALBUMIN SERPL BCP-MCNC: 3.6 G/DL
ALLENS TEST: ABNORMAL
ALP SERPL-CCNC: 82 U/L
ALP SERPL-CCNC: 83 U/L
ALT SERPL W/O P-5'-P-CCNC: 18 U/L
ALT SERPL W/O P-5'-P-CCNC: 20 U/L
ANION GAP SERPL CALC-SCNC: 14 MMOL/L
ANION GAP SERPL CALC-SCNC: 7 MMOL/L
AST SERPL-CCNC: 23 U/L
AST SERPL-CCNC: 25 U/L
BASOPHILS # BLD AUTO: 0.02 K/UL
BASOPHILS NFR BLD: 0.3 %
BILIRUB SERPL-MCNC: 1 MG/DL
BILIRUB SERPL-MCNC: 1.2 MG/DL
BILIRUB UR QL STRIP: NEGATIVE
BUN SERPL-MCNC: 18 MG/DL
BUN SERPL-MCNC: 19 MG/DL
CALCIUM SERPL-MCNC: 9.6 MG/DL
CALCIUM SERPL-MCNC: 9.8 MG/DL
CHLORIDE SERPL-SCNC: 100 MMOL/L
CHLORIDE SERPL-SCNC: 101 MMOL/L
CLARITY UR REFRACT.AUTO: CLEAR
CO2 SERPL-SCNC: 26 MMOL/L
CO2 SERPL-SCNC: 28 MMOL/L
COLOR UR AUTO: YELLOW
CREAT SERPL-MCNC: 1.7 MG/DL
CREAT SERPL-MCNC: 1.7 MG/DL
DIFFERENTIAL METHOD: ABNORMAL
EOSINOPHIL # BLD AUTO: 0.1 K/UL
EOSINOPHIL NFR BLD: 1.9 %
ERYTHROCYTE [DISTWIDTH] IN BLOOD BY AUTOMATED COUNT: 15.6 %
EST. GFR  (AFRICAN AMERICAN): 52.8 ML/MIN/1.73 M^2
EST. GFR  (AFRICAN AMERICAN): 52.8 ML/MIN/1.73 M^2
EST. GFR  (NON AFRICAN AMERICAN): 45.7 ML/MIN/1.73 M^2
EST. GFR  (NON AFRICAN AMERICAN): 45.7 ML/MIN/1.73 M^2
GLUCOSE SERPL-MCNC: 82 MG/DL
GLUCOSE SERPL-MCNC: 87 MG/DL
GLUCOSE UR QL STRIP: NEGATIVE
HCO3 UR-SCNC: 28.7 MMOL/L (ref 24–28)
HCT VFR BLD AUTO: 37.2 %
HGB BLD-MCNC: 11.9 G/DL
HGB UR QL STRIP: NEGATIVE
IMM GRANULOCYTES # BLD AUTO: 0.01 K/UL
IMM GRANULOCYTES NFR BLD AUTO: 0.2 %
KETONES UR QL STRIP: NEGATIVE
LEUKOCYTE ESTERASE UR QL STRIP: NEGATIVE
LYMPHOCYTES # BLD AUTO: 1.2 K/UL
LYMPHOCYTES NFR BLD: 21.3 %
MAGNESIUM SERPL-MCNC: 1.7 MG/DL
MCH RBC QN AUTO: 27.2 PG
MCHC RBC AUTO-ENTMCNC: 32 G/DL
MCV RBC AUTO: 85 FL
MONOCYTES # BLD AUTO: 0.7 K/UL
MONOCYTES NFR BLD: 11.5 %
NEUTROPHILS # BLD AUTO: 3.7 K/UL
NEUTROPHILS NFR BLD: 64.8 %
NITRITE UR QL STRIP: NEGATIVE
NRBC BLD-RTO: 0 /100 WBC
PCO2 BLDA: 47.7 MMHG (ref 35–45)
PH SMN: 7.39 [PH] (ref 7.35–7.45)
PH UR STRIP: 8 [PH] (ref 5–8)
PLATELET # BLD AUTO: 196 K/UL
PMV BLD AUTO: 11.5 FL
PO2 BLDA: 24 MMHG (ref 40–60)
POC BE: 4 MMOL/L
POC SATURATED O2: 42 % (ref 95–100)
POC TCO2: 30 MMOL/L (ref 24–29)
POTASSIUM SERPL-SCNC: 3.6 MMOL/L
POTASSIUM SERPL-SCNC: 3.9 MMOL/L
PROT SERPL-MCNC: 6.9 G/DL
PROT SERPL-MCNC: 7 G/DL
PROT UR QL STRIP: NEGATIVE
RBC # BLD AUTO: 4.37 M/UL
SAMPLE: ABNORMAL
SITE: ABNORMAL
SODIUM SERPL-SCNC: 136 MMOL/L
SODIUM SERPL-SCNC: 140 MMOL/L
SP GR UR STRIP: 1.01 (ref 1–1.03)
URN SPEC COLLECT METH UR: NORMAL
UROBILINOGEN UR STRIP-ACNC: NEGATIVE EU/DL
WBC # BLD AUTO: 5.73 K/UL

## 2018-03-06 PROCEDURE — 80053 COMPREHEN METABOLIC PANEL: CPT | Mod: NTX

## 2018-03-06 PROCEDURE — 87040 BLOOD CULTURE FOR BACTERIA: CPT | Mod: 59,NTX

## 2018-03-06 PROCEDURE — 45385 COLONOSCOPY W/LESION REMOVAL: CPT | Performed by: COLON & RECTAL SURGERY

## 2018-03-06 PROCEDURE — 25000003 PHARM REV CODE 250: Mod: NTX | Performed by: STUDENT IN AN ORGANIZED HEALTH CARE EDUCATION/TRAINING PROGRAM

## 2018-03-06 PROCEDURE — 99152 MOD SED SAME PHYS/QHP 5/>YRS: CPT | Mod: ,,, | Performed by: INTERNAL MEDICINE

## 2018-03-06 PROCEDURE — 5A02210 ASSISTANCE WITH CARDIAC OUTPUT USING BALLOON PUMP, CONTINUOUS: ICD-10-PCS | Performed by: INTERNAL MEDICINE

## 2018-03-06 PROCEDURE — 63600175 PHARM REV CODE 636 W HCPCS: Mod: NTX

## 2018-03-06 PROCEDURE — 88305 TISSUE EXAM BY PATHOLOGIST: CPT

## 2018-03-06 PROCEDURE — 63600175 PHARM REV CODE 636 W HCPCS: Mod: NTX | Performed by: PHYSICIAN ASSISTANT

## 2018-03-06 PROCEDURE — 63600175 PHARM REV CODE 636 W HCPCS: Mod: NTX | Performed by: STUDENT IN AN ORGANIZED HEALTH CARE EDUCATION/TRAINING PROGRAM

## 2018-03-06 PROCEDURE — 37000009 HC ANESTHESIA EA ADD 15 MINS: Performed by: COLON & RECTAL SURGERY

## 2018-03-06 PROCEDURE — 36415 COLL VENOUS BLD VENIPUNCTURE: CPT | Mod: NTX

## 2018-03-06 PROCEDURE — D9220A PRA ANESTHESIA: Mod: ANES,NTX,, | Performed by: ANESTHESIOLOGY

## 2018-03-06 PROCEDURE — 99152 MOD SED SAME PHYS/QHP 5/>YRS: CPT

## 2018-03-06 PROCEDURE — 25000003 PHARM REV CODE 250: Mod: NTX | Performed by: INTERNAL MEDICINE

## 2018-03-06 PROCEDURE — 25000003 PHARM REV CODE 250: Mod: NTX

## 2018-03-06 PROCEDURE — 25000003 PHARM REV CODE 250: Mod: NTX | Performed by: PHYSICIAN ASSISTANT

## 2018-03-06 PROCEDURE — 20600001 HC STEP DOWN PRIVATE ROOM: Mod: NTX

## 2018-03-06 PROCEDURE — 37000008 HC ANESTHESIA 1ST 15 MINUTES: Performed by: COLON & RECTAL SURGERY

## 2018-03-06 PROCEDURE — 83735 ASSAY OF MAGNESIUM: CPT | Mod: NTX

## 2018-03-06 PROCEDURE — D9220A PRA ANESTHESIA: Mod: CRNA,NTX,, | Performed by: NURSE ANESTHETIST, CERTIFIED REGISTERED

## 2018-03-06 PROCEDURE — 0DBM8ZX EXCISION OF DESCENDING COLON, VIA NATURAL OR ARTIFICIAL OPENING ENDOSCOPIC, DIAGNOSTIC: ICD-10-PCS | Performed by: COLON & RECTAL SURGERY

## 2018-03-06 PROCEDURE — A4216 STERILE WATER/SALINE, 10 ML: HCPCS | Mod: NTX | Performed by: PHYSICIAN ASSISTANT

## 2018-03-06 PROCEDURE — 25000003 PHARM REV CODE 250: Mod: NTX | Performed by: NURSE ANESTHETIST, CERTIFIED REGISTERED

## 2018-03-06 PROCEDURE — 27000202 HC IABP, ADD'L DAY: Mod: NTX

## 2018-03-06 PROCEDURE — 88305 TISSUE EXAM BY PATHOLOGIST: CPT | Mod: 26,,,

## 2018-03-06 PROCEDURE — 27100094 HC IABP, SET-UP: Mod: NTX

## 2018-03-06 PROCEDURE — 45385 COLONOSCOPY W/LESION REMOVAL: CPT | Mod: 33,,, | Performed by: COLON & RECTAL SURGERY

## 2018-03-06 PROCEDURE — 33967 INSERT I-AORT PERCUT DEVICE: CPT | Mod: ,,, | Performed by: INTERNAL MEDICINE

## 2018-03-06 PROCEDURE — 27201089 HC SNARE, DISP (ANY): Performed by: COLON & RECTAL SURGERY

## 2018-03-06 PROCEDURE — 86146 BETA-2 GLYCOPROTEIN ANTIBODY: CPT | Mod: NTX

## 2018-03-06 PROCEDURE — 99233 SBSQ HOSP IP/OBS HIGH 50: CPT | Mod: ,,, | Performed by: INTERNAL MEDICINE

## 2018-03-06 PROCEDURE — 85025 COMPLETE CBC W/AUTO DIFF WBC: CPT | Mod: NTX

## 2018-03-06 PROCEDURE — C1894 INTRO/SHEATH, NON-LASER: HCPCS

## 2018-03-06 PROCEDURE — 81003 URINALYSIS AUTO W/O SCOPE: CPT | Mod: NTX

## 2018-03-06 RX ORDER — ONDANSETRON 2 MG/ML
4 INJECTION INTRAMUSCULAR; INTRAVENOUS ONCE AS NEEDED
Status: CANCELLED | OUTPATIENT
Start: 2018-03-06 | End: 2018-03-06

## 2018-03-06 RX ORDER — SODIUM CHLORIDE 0.9 % (FLUSH) 0.9 %
3 SYRINGE (ML) INJECTION
Status: CANCELLED | OUTPATIENT
Start: 2018-03-06

## 2018-03-06 RX ORDER — LANOLIN ALCOHOL/MO/W.PET/CERES
800 CREAM (GRAM) TOPICAL 2 TIMES DAILY
Status: DISCONTINUED | OUTPATIENT
Start: 2018-03-06 | End: 2018-03-08

## 2018-03-06 RX ORDER — FUROSEMIDE 10 MG/ML
40 INJECTION INTRAMUSCULAR; INTRAVENOUS ONCE
Status: COMPLETED | OUTPATIENT
Start: 2018-03-06 | End: 2018-03-06

## 2018-03-06 RX ORDER — ETOMIDATE 2 MG/ML
INJECTION INTRAVENOUS
Status: DISCONTINUED | OUTPATIENT
Start: 2018-03-06 | End: 2018-03-06

## 2018-03-06 RX ORDER — DIPHENHYDRAMINE HCL 25 MG
50 CAPSULE ORAL ONCE
Status: DISCONTINUED | OUTPATIENT
Start: 2018-03-06 | End: 2018-03-06 | Stop reason: HOSPADM

## 2018-03-06 RX ORDER — POTASSIUM CHLORIDE 20 MEQ/1
40 TABLET, EXTENDED RELEASE ORAL ONCE
Status: COMPLETED | OUTPATIENT
Start: 2018-03-06 | End: 2018-03-06

## 2018-03-06 RX ORDER — COLCHICINE 0.6 MG/1
0.6 TABLET, FILM COATED ORAL ONCE
Status: COMPLETED | OUTPATIENT
Start: 2018-03-06 | End: 2018-03-06

## 2018-03-06 RX ORDER — SODIUM CHLORIDE 9 MG/ML
INJECTION, SOLUTION INTRAVENOUS CONTINUOUS PRN
Status: DISCONTINUED | OUTPATIENT
Start: 2018-03-06 | End: 2018-03-06

## 2018-03-06 RX ORDER — VANCOMYCIN/0.9 % SOD CHLORIDE 1 G/100 ML
1000 PLASTIC BAG, INJECTION (ML) INTRAVENOUS ONCE
Status: COMPLETED | OUTPATIENT
Start: 2018-03-06 | End: 2018-03-06

## 2018-03-06 RX ORDER — COLCHICINE 0.6 MG/1
0.6 TABLET, FILM COATED ORAL
Status: COMPLETED | OUTPATIENT
Start: 2018-03-06 | End: 2018-03-06

## 2018-03-06 RX ADMIN — COLCHICINE 0.6 MG: 0.6 TABLET, FILM COATED ORAL at 07:03

## 2018-03-06 RX ADMIN — DOBUTAMINE IN DEXTROSE 5 MCG/KG/MIN: 200 INJECTION, SOLUTION INTRAVENOUS at 06:03

## 2018-03-06 RX ADMIN — ETOMIDATE 4 MG: 2 INJECTION, SOLUTION INTRAVENOUS at 02:03

## 2018-03-06 RX ADMIN — ALLOPURINOL 100 MG: 100 TABLET ORAL at 09:03

## 2018-03-06 RX ADMIN — Medication 3 ML: at 04:03

## 2018-03-06 RX ADMIN — COLCHICINE 0.6 MG: 0.6 TABLET, FILM COATED ORAL at 11:03

## 2018-03-06 RX ADMIN — DOCUSATE SODIUM 100 MG: 100 CAPSULE, LIQUID FILLED ORAL at 09:03

## 2018-03-06 RX ADMIN — Medication 800 MG: at 08:03

## 2018-03-06 RX ADMIN — FUROSEMIDE 40 MG: 10 INJECTION, SOLUTION INTRAMUSCULAR; INTRAVENOUS at 05:03

## 2018-03-06 RX ADMIN — ETOMIDATE 2 MG: 2 INJECTION, SOLUTION INTRAVENOUS at 02:03

## 2018-03-06 RX ADMIN — DOCUSATE SODIUM 100 MG: 100 CAPSULE, LIQUID FILLED ORAL at 04:03

## 2018-03-06 RX ADMIN — DOCUSATE SODIUM 100 MG: 100 CAPSULE, LIQUID FILLED ORAL at 08:03

## 2018-03-06 RX ADMIN — DOBUTAMINE IN DEXTROSE 5 MCG/KG/MIN: 200 INJECTION, SOLUTION INTRAVENOUS at 05:03

## 2018-03-06 RX ADMIN — POTASSIUM CHLORIDE 40 MEQ: 1500 TABLET, EXTENDED RELEASE ORAL at 08:03

## 2018-03-06 RX ADMIN — ETOMIDATE 4 MG: 2 INJECTION, SOLUTION INTRAVENOUS at 03:03

## 2018-03-06 RX ADMIN — TRAMADOL HYDROCHLORIDE 50 MG: 50 TABLET, FILM COATED ORAL at 05:03

## 2018-03-06 RX ADMIN — Medication 1 G: at 03:03

## 2018-03-06 RX ADMIN — Medication 800 MG: at 09:03

## 2018-03-06 RX ADMIN — SODIUM CHLORIDE: 0.9 INJECTION, SOLUTION INTRAVENOUS at 01:03

## 2018-03-06 RX ADMIN — COLCHICINE 0.6 MG: 0.6 TABLET, FILM COATED ORAL at 10:03

## 2018-03-06 RX ADMIN — AMIODARONE HYDROCHLORIDE 200 MG: 200 TABLET ORAL at 08:03

## 2018-03-06 RX ADMIN — FUROSEMIDE 40 MG: 10 INJECTION, SOLUTION INTRAMUSCULAR; INTRAVENOUS at 11:03

## 2018-03-06 RX ADMIN — ALLOPURINOL 100 MG: 100 TABLET ORAL at 08:03

## 2018-03-06 RX ADMIN — FUROSEMIDE 40 MG: 10 INJECTION, SOLUTION INTRAMUSCULAR; INTRAVENOUS at 08:03

## 2018-03-06 RX ADMIN — ETOMIDATE 6 MG: 2 INJECTION, SOLUTION INTRAVENOUS at 02:03

## 2018-03-06 RX ADMIN — TRAMADOL HYDROCHLORIDE 50 MG: 50 TABLET, FILM COATED ORAL at 04:03

## 2018-03-06 RX ADMIN — ALLOPURINOL 100 MG: 100 TABLET ORAL at 04:03

## 2018-03-06 RX ADMIN — ASPIRIN 81 MG: 81 TABLET, COATED ORAL at 08:03

## 2018-03-06 RX ADMIN — Medication 3 ML: at 10:03

## 2018-03-06 RX ADMIN — Medication 3 ML: at 06:03

## 2018-03-06 NOTE — ASSESSMENT & PLAN NOTE
Plan for IABP pre-VAD  Access via LEFT CFA  ICU bed requested per primary team    -The risks, benefits & alternatives of the procedure were explained to the patient.    -The risks of moderate sedation include hypotension, respiratory depression, arrhythmias, bronchospasm, & death.    -Informed consent was obtained & the patient is agreeable to proceed with the procedure.  -This patient was discussed with the attending interventional cardiologist who agrees with the above assessment & plan.

## 2018-03-06 NOTE — PLAN OF CARE
Problem: Patient Care Overview  Goal: Plan of Care Review  Outcome: Ongoing (interventions implemented as appropriate)  Plan of care discussed with patient. Patient is free of fall/trauma/injury. Colchicine given to combat gout. Patient went to endoscopy for c- scope. Will go to ICU after and receive balloon pump. Plan for LVAD surgery Thursday.  Denies CP, SOB, or pain/discomfort. All questions addressed. Will continue to monitor

## 2018-03-06 NOTE — ASSESSMENT & PLAN NOTE
- cr 1.7 today  - Best course is to admit for accelerated evaluation and consideration for IABP to see if kidneys are reversible and discuss LVAD (tentative date 3/8)

## 2018-03-06 NOTE — NURSING TRANSFER
Nursing Transfer Note      3/6/2018     Transfer To: Endoscopy     Transfer via stretcher    Transfer with cardiac monitoring    Transported by transport    Medicines sent: Dobutamine    Chart send with patient: Yes    Notified: spouse

## 2018-03-06 NOTE — SUBJECTIVE & OBJECTIVE
Subjective:     Interval History: Completed bowel prep overnight, having clear BMs, inpatient scope today.     Post-Op Info:  Procedure(s) (LRB):  COLONOSCOPY (N/A)          Medications:  Continuous Infusions:   DOBUTamine 5 mcg/kg/min (03/06/18 0502)     Scheduled Meds:   allopurinol  100 mg Oral TID    amiodarone  200 mg Oral Daily    aspirin  81 mg Oral Daily    docusate sodium  100 mg Oral TID    enoxaparin  40 mg Subcutaneous Daily    furosemide  40 mg Intravenous BID    magnesium oxide  800 mg Oral BID    potassium chloride  40 mEq Oral Once    sodium chloride 0.9%  3 mL Intravenous Q8H     PRN Meds:   traMADol        Objective:     Vital Signs (Most Recent):  Temp: 98.2 °F (36.8 °C) (03/06/18 0719)  Pulse: 95 (03/06/18 0719)  Resp: 16 (03/06/18 0719)  BP: 132/63 (03/06/18 0719)  SpO2: (!) 94 % (03/06/18 0719) Vital Signs (24h Range):  Temp:  [97.6 °F (36.4 °C)-98.8 °F (37.1 °C)] 98.2 °F (36.8 °C)  Pulse:  [] 95  Resp:  [16-18] 16  SpO2:  [94 %-97 %] 94 %  BP: (103-132)/(62-73) 132/63     Intake/Output - Last 3 Shifts       03/04 0700 - 03/05 0659 03/05 0700 - 03/06 0659 03/06 0700 - 03/07 0659    P.O. 1620 4720     I.V. (mL/kg) 184 (1.6)      Total Intake(mL/kg) 1804 (15.3) 4720 (40)     Urine (mL/kg/hr) 2025 (0.7) 620 (0.2)     Stool 0 (0) 0 (0)     Total Output 2025 620      Net -221 +4100             Urine Occurrence  1 x     Stool Occurrence 0 x 3 x           Physical Exam   Constitutional: He is oriented to person, place, and time. He appears well-developed and well-nourished.   HENT:   Head: Normocephalic and atraumatic.   Eyes: Conjunctivae are normal. Pupils are equal, round, and reactive to light.   Neck: Normal range of motion. Neck supple.   Cardiovascular: Intact distal pulses.    Pulmonary/Chest: Effort normal. No respiratory distress.   Abdominal: Soft. He exhibits no distension and no mass. There is no tenderness. There is no rebound and no guarding. No hernia.    Musculoskeletal: Normal range of motion. He exhibits no edema or deformity.   Neurological: He is alert and oriented to person, place, and time.   Skin: Skin is warm and dry. No erythema.   Psychiatric: He has a normal mood and affect. His behavior is normal. Judgment and thought content normal.   Nursing note and vitals reviewed.          Significant Labs:  BMP (Last 3 Results):   Recent Labs  Lab 03/02/18  1537  03/05/18  0520 03/05/18  0820 03/05/18  1659 03/06/18  0507   GLU 76  < > 95  --  87 82     < > 138  --  138 140   K 3.9  < > 3.7  --  4.1 3.6   CL 98  < > 100  --  102 100   CO2 28  < > 28  --  28 26   BUN 48*  < > 28*  --  24* 19   CREATININE 2.3*  < > 1.9*  --  1.8* 1.7*   CALCIUM 10.0  < > 9.8  --  9.9 9.8   MG 2.1  --   --  2.0  --  1.7   < > = values in this interval not displayed.  CBC (Last 3 Results):   Recent Labs  Lab 03/04/18  0530 03/05/18  0520 03/06/18  0507   WBC 4.95 5.07 5.73   RBC 4.81 4.68 4.37*   HGB 13.4* 13.2* 11.9*   HCT 41.5 40.0 37.2*    199 196   MCV 86 86 85   MCH 27.9 28.2 27.2   MCHC 32.3 33.0 32.0       Significant Diagnostics:  I have reviewed all pertinent imaging results/findings within the past 24 hours.

## 2018-03-06 NOTE — PROGRESS NOTES
Notified Tanya in Endoscopy that patient should be transferred to ICU after his procedure. Informed her that he is on a CSU telemetry box and the box should be coming back to the unit once he is transferred. Verbalized understanding.

## 2018-03-06 NOTE — ASSESSMENT & PLAN NOTE
- Cr 2.8 in clinic, down to 1.7 now s/p diuresis and initiation of inotropes  - Leave central line as IABP will be placed today, get numbers tomorrow, then D/C (culture tip).  C-scope today, IABP this afternoon (CMICU transfer orders in place). Vancomycin ordered for on call to cath lab.   - UA, Blood cultures x2 today, not on any afterload reduction  - JVD at clavicle lying flat today. Continue IV push lasix   - Will present patient tomorrow, tentative VAD date 3/8 with Dr. Kaufman  - Will hold off on GDMT for now as VAD this admit

## 2018-03-06 NOTE — H&P
Procedure : Colonoscopy    Indication(s):  asymptomatic screening exam    Review of patient's allergies indicates:   Allergen Reactions    Lisinopril Anaphylaxis       Past Medical History:   Diagnosis Date    CHF (congestive heart failure)     Dilated cardiomyopathy 1/10/2018    Disorder of kidney and ureter     CKD    Gout     HTN (hypertension)     Ventricular tachycardia (paroxysmal)        Prior to Admission medications    Medication Sig Start Date End Date Taking? Authorizing Provider   allopurinol (ZYLOPRIM) 100 MG tablet TAKE 1 TABLET (100 MG TOTAL) BY MOUTH 3 (THREE) TIMES DAILY. 1/31/18  Yes Carmencita Ng PA-C   amiodarone (PACERONE) 200 MG Tab TAKE 1 TABLET (200 MG TOTAL) BY MOUTH ONCE DAILY. 6/12/17  Yes Nader Chiu MD   aspirin (ECOTRIN) 81 MG EC tablet Take 81 mg by mouth. 1 Tablet, Delayed Release (E.C.) Oral Every day   Yes Historical Provider, MD   bumetanide (BUMEX) 2 MG tablet Take 1 tablet (2 mg total) by mouth 2 (two) times daily. 2/27/18 2/27/19 Yes Antonio Hadley Jr., MD   losartan (COZAAR) 25 MG tablet Take 25 mg by mouth once daily. 12/7/17  Yes Historical Provider, MD   potassium chloride SA (K-DUR,KLOR-CON) 10 MEQ tablet Take 2 tablets (20 mEq total) by mouth 2 (two) times daily. 2/27/18  Yes Antonio Hadley Jr., MD   spironolactone (ALDACTONE) 25 MG tablet Take 1 tablet (25 mg total) by mouth once daily. 1/17/18 1/17/19 Yes Vimal Bernal MD   ERGOCALCIFEROL, VITAMIN D2, (VITAMIN D ORAL) Take by mouth once daily.    Historical Provider, MD   metOLazone (ZAROXOLYN) 2.5 MG tablet Take 2.5 mg by mouth every other day. 1/15/18   Historical Provider, MD   metoprolol succinate (TOPROL-XL) 25 MG 24 hr tablet Take 0.5 tablets (12.5 mg total) by mouth once daily. 1/18/18 1/18/19  Vimal Bernal MD   valsartan (DIOVAN) 80 MG tablet Take 0.5 tablets (40 mg total) by mouth 2 (two) times daily. 2/12/18   Antonio Hadley Jr., MD   VITAMIN E ACETATE (VITAMIN E ORAL) Take by mouth  once daily.    Historical Provider, MD       Sedation Problems: NO    Family History   Problem Relation Age of Onset    Hypertension Father     Diabetes Father     Coronary artery disease Father     Heart disease Father      CHF    No Known Problems Mother     Cancer Sister 54     breast CA    No Known Problems Brother        Fam Hx of Sedation Problems: NO    Social History     Social History    Marital status:      Spouse name: N/A    Number of children: N/A    Years of education: N/A     Occupational History     [employer]      Social History Main Topics    Smoking status: Former Smoker     Packs/day: 1.00     Years: 31.00     Types: Cigarettes     Quit date: 1/12/2018    Smokeless tobacco: Never Used      Comment: pt is quiting on his own - pt stated not qualified for program; 2/16 pt  quit on his own    Alcohol use No      Comment: one fifth of gin or rum/week    Drug use: No    Sexual activity: Yes     Partners: Female     Birth control/ protection: None      Comment: 10/5/17  with same partner 7 years      Other Topics Concern    Not on file     Social History Narrative    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       Review of Systems -     Respiratory ROS: no cough, shortness of breath, or wheezing  Cardiovascular ROS: positive for - dyspnea on exertion  Gastrointestinal ROS: no abdominal pain, change in bowel habits, or black or bloody stools  Musculoskeletal ROS: negative  Neurological ROS: no TIA or stroke symptoms        Physical Exam:  General: no distress  Head: normocephalic  Airway:  normal oropharynx, airway normal  Neck: supple, symmetrical, trachea midline  Lungs:  normal respiratory effort  Heart: regular rate and rhythm  Abdomen: soft, non-tender non-distented; bowel sounds normal; no masses,  no organomegaly  Extremities: no cyanosis or edema, or clubbing       Deep Sedation: Mallampati Score per anesthesia     SedationPlan :Moderate     ASA : III    Patient  is medically cleared for anesthesia.    Anesthesia/Surgery risks, benefits and alternative options discussed and understood by patient/family.

## 2018-03-06 NOTE — PROGRESS NOTES
Patient's Telemetry read Vtach and heart rate sustained in the 120s. Definitive rhythm change. Pacer spikes noted on screen. Notified Endoscopy, stated patient was en route to Cath Lab. Notified Chester in Cath Lab. Stated he would assess the patient immediately when he arrived. Notified Jack Gustafson stated that patient has runs of Vtach in history.

## 2018-03-06 NOTE — PROGRESS NOTES
Pt AAOX3 with flat affect, seems more anxious than yesterday. Wife not at bedside. Pt aware of POC for IABP today and VAD Thursday, 3/8/18. Will revisit later today.

## 2018-03-06 NOTE — PROGRESS NOTES
[facility]  Heart Transplant  Progress Note    Patient Name: Tim Richards  MRN: [id number]  Admission Date: 3/1/2018  Hospital Length of Stay: 5 days  Attending Physician: Amparo Lopez MD  Primary Care Provider: Ja Méndez MD  Principal Problem:Acute decompensated heart failure    Subjective:     Interval History: No complaints today, will leave central line in today as patient is going to IABP this afternoon after c-scope with planned LVAD implantation on 3/8 w/ Dr Kaufman. Patient was in good spirits this AM, states he is here, he wants to get this over with and feel better.    Continuous Infusions:   DOBUTamine 5 mcg/kg/min (03/06/18 0502)     Scheduled Meds:   allopurinol  100 mg Oral TID    amiodarone  200 mg Oral Daily    aspirin  81 mg Oral Daily    docusate sodium  100 mg Oral TID    enoxaparin  40 mg Subcutaneous Daily    furosemide  40 mg Intravenous BID    magnesium oxide  800 mg Oral BID    sodium chloride 0.9%  3 mL Intravenous Q8H    vancomycin (VANCOCIN) IVPB  1,000 mg Intravenous Once     PRN Meds:traMADol    Review of patient's allergies indicates:   Allergen Reactions    Lisinopril Anaphylaxis     Objective:     Vital Signs (Most Recent):  Temp: 98.2 °F (36.8 °C) (03/06/18 0719)  Pulse: 95 (03/06/18 0719)  Resp: 16 (03/06/18 0719)  BP: 132/63 (03/06/18 0719)  SpO2: (!) 94 % (03/06/18 0719) Vital Signs (24h Range):  Temp:  [97.6 °F (36.4 °C)-98.8 °F (37.1 °C)] 98.2 °F (36.8 °C)  Pulse:  [] 95  Resp:  [16-18] 16  SpO2:  [94 %-97 %] 94 %  BP: (103-132)/(62-73) 132/63     Patient Vitals for the past 72 hrs (Last 3 readings):   Weight   03/06/18 0708 118.7 kg (261 lb 11 oz)   03/05/18 0700 118.1 kg (260 lb 5.8 oz)   03/04/18 0700 117.7 kg (259 lb 7.7 oz)     Body mass index is 34.53 kg/m².      Intake/Output Summary (Last 24 hours) at 03/06/18 1002  Last data filed at 03/06/18 0500   Gross per 24 hour   Intake             4720 ml   Output               620 ml   Net             4100 ml     Hemodynamic Parameters:    Physical Exam   Constitutional: He appears well-developed and well-nourished. No distress.   HENT:   Head: Normocephalic and atraumatic.   Eyes: Pupils are equal, round, and reactive to light.   Neck: Normal range of motion. No JVD (at clavicle) present.   Cardiovascular: Normal rate.  Exam reveals no friction rub.    No murmur heard.  Pulmonary/Chest: Effort normal. No respiratory distress. He has no wheezes.   Abdominal: Soft. He exhibits no distension.   Musculoskeletal: Normal range of motion. He exhibits no edema.   Neurological: He is alert.   Skin: Skin is warm. Capillary refill takes 2 to 3 seconds. He is not diaphoretic. No erythema.   Psychiatric: He has a normal mood and affect. His behavior is normal. Judgment and thought content normal.     Significant Labs:  CBC:    Recent Labs  Lab 03/04/18  0530 03/05/18  0520 03/06/18  0507   WBC 4.95 5.07 5.73   RBC 4.81 4.68 4.37*   HGB 13.4* 13.2* 11.9*   HCT 41.5 40.0 37.2*    199 196   MCV 86 86 85   MCH 27.9 28.2 27.2   MCHC 32.3 33.0 32.0     BNP:    Recent Labs  Lab 03/01/18  0816 03/05/18  0820   * 1,270*     CMP:    Recent Labs  Lab 03/05/18  0520 03/05/18  1659 03/06/18  0507   GLU 95 87 82   CALCIUM 9.8 9.9 9.8   ALBUMIN 3.8 3.7 3.5   PROT 7.5 7.4 6.9    138 140   K 3.7 4.1 3.6   CO2 28 28 26    102 100   BUN 28* 24* 19   CREATININE 1.9* 1.8* 1.7*   ALKPHOS 110 95 83   ALT 19 19 18   AST 23 24 25   BILITOT 0.7 0.9 1.0      Coagulation:     Recent Labs  Lab 03/01/18  1721   INR 1.1     LDH:  No results for input(s): LDH in the last 72 hours.  Microbiology:  Microbiology Results (last 7 days)     ** No results found for the last 168 hours. **          I have reviewed all pertinent labs within the past 24 hours.    Estimated Creatinine Clearance: 69.4 mL/min (A) (based on SCr of 1.7 mg/dL (H)).    Diagnostic Results:  I have reviewed and interpreted all pertinent  imaging results/findings within the past 24 hours.    Assessment and Plan:     Mr. Richards is a 52 y.o. year old Black or  male who has presents as f/u from hospitalization for ADHF after presenting to clinic 1/10/17 as initial consult. advanced surgical options (LVAD/OHT).    This 53 yo BM has had CHF since 2011 at which time an angiogram did not demonstrate any CAD.  He is on amiodarone for VT. He was admitted from 1/12-1/17/18 (see d/c summary) where he was treated for ADHF and initiation of w/u for advanced options. RHC during that admission showed RA 17 and PCWP 35 as well as severely reduced CI 1.6. Though not optimized, he was discharged per his request so as not to lose his job/insurance--see Dr. Hadley's attestation for full details on d/c summary. Since d/c, his Scr has risen from 2.0 on discharge to 2.8 (1/23/18). His BNP had declined to 1040.  He presents today for scheduled hospital f/u.      Today, he reports feeling well. He says he has more energy than usual. His only complaints are cramping and xerosis. He denies n/v/d, no CP, palpitations or syncope. He has stable orthostatic dizziness/LH. No PND or orthopnea. His COLEMAN is improved from discharge and his weight is down about 5-7 pounds on his home scale. Of note, his Scr on labs from 2 days ago showed Scr 2.8 up from 2.0. T. Bili was down from 1.1 to 0.6 and BNP was down to 1040 from 1700.  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 No labs initially ordered but I ordered and sent him down.     CKD (chronic kidney disease), stage III    - cr 1.7 today  - Best course is to admit for accelerated evaluation and consideration for IABP to see if kidneys are reversible and discuss LVAD (tentative date 3/8)        PVT (paroxysmal ventricular tachycardia)    -will hold beta blocker for now given hypotension, now on         Acute on chronic combined systolic and diastolic heart failure    - Cr 2.8 in clinic, down to 1.7  now s/p diuresis and initiation of inotropes  - Leave central line as IABP will be placed today, get numbers tomorrow, then D/C (culture tip).  C-scope today, IABP this afternoon (CM[facility] transfer orders in place). Vancomycin ordered for on call to cath lab.   - UA, Blood cultures x2 today, not on any afterload reduction  - JVD at clavicle lying flat today. Continue IV push lasix   - Will present patient tomorrow, tentative VAD date 3/8 with Dr. Kaufman  - Will hold off on GDMT for now as VAD this admit        Biventricular implantable cardioverter-defibrillator in situ    - in place  - ICD interrogated, event noted on 2/25/18 with appropriate shock for VT            MARILYN KitchenC  Heart Transplant  [facility]

## 2018-03-06 NOTE — NURSING TRANSFER
Nursing Transfer Note      3/6/2018     Transfer From: Endoscopy     Transfer via stretcher    Transfer with cardiac monitoring    Transported by transport    Medicines sent:     Chart send with patient: Yes    Notified: spouse    Patient reassessed at: 1200 3/6/2018    Upon arrival to floor: cardiac monitor applied, patient oriented to room, call bell in reach and bed in lowest position

## 2018-03-06 NOTE — PROGRESS NOTES
Notified Jack HOLT that patient's is requesting medication, takes prednisone at home. Also notified her that we atttempted to get 2 PIVs yesterday but PICC team was unable to see him and night shift was unable to get them either. Spoke with PICC team this morning about patient getting 2 Ivs workings. Stated that its ok because he is going to cath lab today anyway.

## 2018-03-06 NOTE — ASSESSMENT & PLAN NOTE
52 year old male with CHF being worked up for a heart transplant  Plan for inpatient C-scope today this afternoon  Prepped overnight w/ golytely  CLD this AM NPO after 9am  Above discussed with Dr. Bone

## 2018-03-06 NOTE — CONSULTS
Palliative Care Acknowledgement of Consult - .3/6/18    Consult received.  Full consult to follow for LVAD preparedness. .    Thank you for allowing us to be a part of the care of this patient.    Savanna NIETO, APRN, AGCNS

## 2018-03-06 NOTE — PROGRESS NOTES
UPDATE to Adult Psychosocial Assessment for LVAD and Transplant    At initial assessment pt did not name a back-up caregiver. SW phoned pt's wife due to pt in Cath Lab. Wife reports her son will serve as back-up.    Name: Pasha Pratt  Age: 27  Relationship: Step-son  City/State: [place], [place]; lives with pt and pt's wife  Drives: Yes  Works: Full-time but able to get time off if needed.  Ph: [phone]    Pt's wife reports she remains the primary caregiver.    Pt now presents as suitable for LVAD. Pt will be suitable for transplant pending pt remains tobacco and alcohol free until May 2018. Pt presents as low-risk due to appropriate caregiver plan, good coping, insight and family support.

## 2018-03-06 NOTE — PLAN OF CARE
Problem: Patient Care Overview  Goal: Plan of Care Review  Outcome: Ongoing (interventions implemented as appropriate)  Pt NPO after midnight for cscope.  Pt drain 4L of the golytely, tolerated well.  Lasix drip changed to ivp today.  Will cont to monitor.

## 2018-03-06 NOTE — PROVATION PATIENT INSTRUCTIONS
Discharge Summary/Instructions after an Endoscopic Procedure  Patient Name: Tim Richards  Patient MRN: [id number]  Patient YOB: 1965 Tuesday, March 06, 2018  Alonso Bone MD  RESTRICTIONS:  During your procedure today, you received medications for sedation.  These   medications may affect your judgment, balance and coordination.  Therefore,   for 24 hours, you have the following restrictions:   - DO NOT drive a car, operate machinery, make legal/financial decisions,   sign important papers or drink alcohol.    ACTIVITY:  The following day: return to full activity including work, except no heavy   lifting, straining or running for 3 days if polyps were removed.  DIET:  Eat and drink normally unless instructed otherwise.     TREATMENT FOR COMMON SIDE EFFECTS:  - Mild abdominal pain, nausea, belching, bloating or excessive gas:  rest,   eat lightly and use a heating pad.  - Sore Throat: treat with throat lozenges and/or gargle with warm salt   water.  - Because air was used during the procedure, expelling large amounts of air   from your rectum or belching is normal.  - If a bowel prep was taken, you may not have a bowel movement for 1-3 days.    This is normal.  SYMPTOMS TO WATCH FOR AND REPORT TO YOUR PHYSICIAN:  1. Abdominal pain or bloating, other than gas cramps.  2. Chest pain.  3. Back pain.  4. Signs of infection such as: chills or fever occurring within 24 hours   after the procedure.  5. Rectal bleeding, which would show as bright red, maroon, or black stools.   (A tablespoon of blood from the rectum is not serious, especially if   hemorrhoids are present.)  6. Vomiting.  7. Weakness or dizziness.  GO DIRECTLY TO THE NEAREST EMERGENCY ROOM IF YOU HAVE ANY OF THE FOLLOWING:      Difficulty breathing  Chills and/or fever over 101 F   Persistent vomiting and/or vomiting blood   Severe abdominal pain   Severe chest pain   Black, tarry stools   Bleeding- more than one tablespoon   Any other symptom  or condition that you feel may need urgent attention  Your doctor recommends these additional instructions:  If any biopsies were taken, your doctors clinic will contact you in 1 to 2   weeks with any results.  Advance your diet as tolerated.   Continue your present medications.   We are waiting for your pathology results.   Your physician has recommended a repeat colonoscopy (date to be determined   after pending pathology results are reviewed) for surveillance.  For questions, problems or results please call your physician - Alonso Bone MD at Work:  [phone], Work:  [phone].  [facility], EMERGENCY ROOM PHONE NUMBER: [phone]  IF A COMPLICATION OR EMERGENCY SITUATION ARISES AND YOU ARE UNABLE TO REACH   YOUR PHYSICIAN - GO DIRECTLY TO THE EMERGENCY ROOM.  Alonso Bone MD  3/6/2018 3:04:28 PM  This report has been verified and signed electronically.

## 2018-03-06 NOTE — HPI
52 year old male with NICM s/p CRT-D, HTN, CKD, VT on amidarone and decompensated heart failure who was admitted from clinic yesterday for accelerated LVAD/OHTx workup. He is scheduled for LVAD on 3/8 and CTS requesting IABP pre-vad.

## 2018-03-06 NOTE — NURSING TRANSFER
Nursing Transfer Note      3/6/2018     Transfer To: Endoscopy    Transfer via stretcher    Transfer with cardiac monitoring    Transported by transport and RN    Medicines sent:  and Vancomycin    Chart send with patient: Yes    Notified: spouse

## 2018-03-06 NOTE — PROGRESS NOTES
[facility]  Colorectal Surgery  Progress Note    Patient Name: Tim Richards  MRN: [id number]  Admission Date: 3/1/2018  Hospital Length of Stay: 5 days  Attending Physician: Amparo Lopez MD    Subjective:     Interval History: Completed bowel prep overnight, having clear BMs, inpatient scope today.     Post-Op Info:  Procedure(s) (LRB):  COLONOSCOPY (N/A)          Medications:  Continuous Infusions:   DOBUTamine 5 mcg/kg/min (03/06/18 0502)     Scheduled Meds:   allopurinol  100 mg Oral TID    amiodarone  200 mg Oral Daily    aspirin  81 mg Oral Daily    docusate sodium  100 mg Oral TID    enoxaparin  40 mg Subcutaneous Daily    furosemide  40 mg Intravenous BID    magnesium oxide  800 mg Oral BID    potassium chloride  40 mEq Oral Once    sodium chloride 0.9%  3 mL Intravenous Q8H     PRN Meds:   traMADol        Objective:     Vital Signs (Most Recent):  Temp: 98.2 °F (36.8 °C) (03/06/18 0719)  Pulse: 95 (03/06/18 0719)  Resp: 16 (03/06/18 0719)  BP: 132/63 (03/06/18 0719)  SpO2: (!) 94 % (03/06/18 0719) Vital Signs (24h Range):  Temp:  [97.6 °F (36.4 °C)-98.8 °F (37.1 °C)] 98.2 °F (36.8 °C)  Pulse:  [] 95  Resp:  [16-18] 16  SpO2:  [94 %-97 %] 94 %  BP: (103-132)/(62-73) 132/63     Intake/Output - Last 3 Shifts       03/04 0700 - 03/05 0659 03/05 0700 - 03/06 0659 03/06 0700 - 03/07 0659    P.O. 1620 4720     I.V. (mL/kg) 184 (1.6)      Total Intake(mL/kg) 1804 (15.3) 4720 (40)     Urine (mL/kg/hr) 2025 (0.7) 620 (0.2)     Stool 0 (0) 0 (0)     Total Output 2025 620      Net -221 +4100             Urine Occurrence  1 x     Stool Occurrence 0 x 3 x           Physical Exam   Constitutional: He is oriented to person, place, and time. He appears well-developed and well-nourished.   HENT:   Head: Normocephalic and atraumatic.   Eyes: Conjunctivae are normal. Pupils are equal, round, and reactive to light.   Neck: Normal range of motion. Neck supple.   Cardiovascular: Intact  distal pulses.    Pulmonary/Chest: Effort normal. No respiratory distress.   Abdominal: Soft. He exhibits no distension and no mass. There is no tenderness. There is no rebound and no guarding. No hernia.   Musculoskeletal: Normal range of motion. He exhibits no edema or deformity.   Neurological: He is alert and oriented to person, place, and time.   Skin: Skin is warm and dry. No erythema.   Psychiatric: He has a normal mood and affect. His behavior is normal. Judgment and thought content normal.   Nursing note and vitals reviewed.          Significant Labs:  BMP (Last 3 Results):   Recent Labs  Lab 03/02/18  1537  03/05/18  0520 03/05/18  0820 03/05/18  1659 03/06/18  0507   GLU 76  < > 95  --  87 82     < > 138  --  138 140   K 3.9  < > 3.7  --  4.1 3.6   CL 98  < > 100  --  102 100   CO2 28  < > 28  --  28 26   BUN 48*  < > 28*  --  24* 19   CREATININE 2.3*  < > 1.9*  --  1.8* 1.7*   CALCIUM 10.0  < > 9.8  --  9.9 9.8   MG 2.1  --   --  2.0  --  1.7   < > = values in this interval not displayed.  CBC (Last 3 Results):   Recent Labs  Lab 03/04/18  0530 03/05/18  0520 03/06/18  0507   WBC 4.95 5.07 5.73   RBC 4.81 4.68 4.37*   HGB 13.4* 13.2* 11.9*   HCT 41.5 40.0 37.2*    199 196   MCV 86 86 85   MCH 27.9 28.2 27.2   MCHC 32.3 33.0 32.0       Significant Diagnostics:  I have reviewed all pertinent imaging results/findings within the past 24 hours.    Assessment/Plan:     Screening for colon cancer    52 year old male with CHF being worked up for a heart transplant  Plan for inpatient C-scope today this afternoon  Prepped overnight w/ maia  CLD this AM NPO after 9am  Above discussed with Dr. Lizandro Kemp MD  Colorectal Surgery  [facility]

## 2018-03-06 NOTE — OP NOTE
"    Post Cath Note  Referring Physician: Amparo Lopez MD  Procedure: IABP       Access: Left CFA    See full report for further details    Intervention:   S/p IABP placement via left groin      Post Cath Exam:   /70 (BP Location: Left arm, Patient Position: Lying)   Pulse 96   Temp 98.2 °F (36.8 °C) (Temporal)   Resp 18   Ht 6' 1" (1.854 m)   Wt 118.7 kg (261 lb 11 oz)   SpO2 100%   BMI 34.53 kg/m²   No unusual pain, hematoma, thrill or bruit at vascular access site.  Distal pulse present without signs of ischemia.    Recommendations:   - s/p succesful IABP via LEFT CFA  - Daily CXR for position  - Routine post-cath care    Signed:  Randell Shultz DO  Cardiology Fellow  Pager [phone]          "

## 2018-03-06 NOTE — CONSULTS
[facility]  Interventional Cardiology  Consult Note    Patient Name: Tim Richards  MRN: [id number]  Admission Date: 3/1/2018  Hospital Length of Stay: 5 days  Code Status: Full Code   Attending Provider: Amparo Lopez MD  Consulting Provider: Randell Shultz MD  Primary Care Physician: Ja Méndez MD  Principal Problem:Acute decompensated heart failure    Patient information was obtained from patient and ER records.     Inpatient consult to Interventional Cardiology  Consult performed by: RANDELL SHULTZ  Consult ordered by: JOSE FAN        Subjective:     Chief Complaint:  Heart Failure     HPI:  52 year old male with NICM s/p CRT-D, HTN, CKD, VT on amidarone and decompensated heart failure who was admitted from clinic yesterday for accelerated LVAD/OHTx workup. He is scheduled for LVAD on 3/8 and CTS requesting IABP pre-vad.    Past Medical History:   Diagnosis Date    CHF (congestive heart failure)     Dilated cardiomyopathy 1/10/2018    Disorder of kidney and ureter     CKD    Gout     HTN (hypertension)     Ventricular tachycardia (paroxysmal)        Past Surgical History:   Procedure Laterality Date    CARDIAC CATHETERIZATION  Dec. 2012    CARDIAC DEFIBRILLATOR PLACEMENT Left     CRRT-D       Review of patient's allergies indicates:   Allergen Reactions    Lisinopril Anaphylaxis       PTA Medications   Medication Sig    allopurinol (ZYLOPRIM) 100 MG tablet TAKE 1 TABLET (100 MG TOTAL) BY MOUTH 3 (THREE) TIMES DAILY.    amiodarone (PACERONE) 200 MG Tab TAKE 1 TABLET (200 MG TOTAL) BY MOUTH ONCE DAILY.    aspirin (ECOTRIN) 81 MG EC tablet Take 81 mg by mouth. 1 Tablet, Delayed Release (E.C.) Oral Every day    bumetanide (BUMEX) 2 MG tablet Take 1 tablet (2 mg total) by mouth 2 (two) times daily.    losartan (COZAAR) 25 MG tablet Take 25 mg by mouth once daily.    potassium chloride SA (K-DUR,KLOR-CON) 10 MEQ tablet Take 2 tablets (20 mEq total) by  mouth 2 (two) times daily.    spironolactone (ALDACTONE) 25 MG tablet Take 1 tablet (25 mg total) by mouth once daily.    ERGOCALCIFEROL, VITAMIN D2, (VITAMIN D ORAL) Take by mouth once daily.    metOLazone (ZAROXOLYN) 2.5 MG tablet Take 2.5 mg by mouth every other day.    metoprolol succinate (TOPROL-XL) 25 MG 24 hr tablet Take 0.5 tablets (12.5 mg total) by mouth once daily.    valsartan (DIOVAN) 80 MG tablet Take 0.5 tablets (40 mg total) by mouth 2 (two) times daily.    VITAMIN E ACETATE (VITAMIN E ORAL) Take by mouth once daily.     Family History     Problem Relation (Age of Onset)    Cancer Sister (54)    Coronary artery disease Father    Diabetes Father    Heart disease Father    Hypertension Father    No Known Problems Mother, Brother        Social History Main Topics    Smoking status: Former Smoker     Packs/day: 1.00     Years: 31.00     Types: Cigarettes     Quit date: 1/12/2018    Smokeless tobacco: Never Used      Comment: pt is quiting on his own - pt stated not qualified for program; 2/16 pt  quit on his own    Alcohol use No      Comment: one fifth of gin or rum/week    Drug use: No    Sexual activity: Yes     Partners: Female     Birth control/ protection: None      Comment: 10/5/17  with same partner 7 years      Review of Systems   Constitution: Negative for chills, diaphoresis, fever and weight loss.   HENT: Negative for congestion, hoarse voice and sore throat.    Eyes: Negative for blurred vision and double vision.   Respiratory: Negative for shortness of breath.    Endocrine: Negative for cold intolerance and heat intolerance.   Hematologic/Lymphatic: Does not bruise/bleed easily.   Musculoskeletal: Positive for gout.   Gastrointestinal: Negative for abdominal pain, constipation, diarrhea, nausea and vomiting.   Genitourinary: Negative for dysuria.   Neurological: Negative for focal weakness and sensory change.     Objective:     Vital Signs (Most Recent):  Temp: 98.2 °F  (36.8 °C) (03/06/18 0719)  Pulse: 95 (03/06/18 0719)  Resp: 16 (03/06/18 0719)  BP: 132/63 (03/06/18 0719)  SpO2: (!) 94 % (03/06/18 0719) Vital Signs (24h Range):  Temp:  [97.6 °F (36.4 °C)-98.8 °F (37.1 °C)] 98.2 °F (36.8 °C)  Pulse:  [] 95  Resp:  [16-18] 16  SpO2:  [94 %-97 %] 94 %  BP: (103-132)/(62-73) 132/63     Weight: 118.7 kg (261 lb 11 oz)  Body mass index is 34.53 kg/m².    SpO2: (!) 94 %  O2 Device (Oxygen Therapy): room air      Intake/Output Summary (Last 24 hours) at 03/06/18 0951  Last data filed at 03/06/18 0500   Gross per 24 hour   Intake             4720 ml   Output              620 ml   Net             4100 ml       Lines/Drains/Airways     Central Venous Catheter Line                 Percutaneous Central Line Insertion/Assessment - triple lumen  03/01/18 1800 right internal jugular 4 days                Physical Exam   Constitutional: He is oriented to person, place, and time. He appears well-developed and well-nourished.   HENT:   Head: Normocephalic and atraumatic.   Eyes: EOM are normal. Pupils are equal, round, and reactive to light.   Neck: No JVD present.   Cardiovascular: Regular rhythm.  Tachycardia present.  Exam reveals gallop and S3. Exam reveals no friction rub.    No murmur heard.  Pulses:       Radial pulses are 2+ on the right side, and 2+ on the left side.        Femoral pulses are 2+ on the right side, and 2+ on the left side.       Popliteal pulses are 2+ on the right side, and 2+ on the left side.        Dorsalis pedis pulses are 2+ on the right side, and 2+ on the left side.        Posterior tibial pulses are 1+ on the right side, and 1+ on the left side.   Pulmonary/Chest: Effort normal and breath sounds normal. No respiratory distress. He has no wheezes.   Abdominal: Soft. Bowel sounds are normal. He exhibits no distension.   Musculoskeletal: He exhibits no edema.   Neurological: He is alert and oriented to person, place, and time.   Skin: Skin is warm and dry.    Psychiatric: He has a normal mood and affect.       Significant Labs:   Recent Lab Results       03/06/18  0507 03/05/18  1659      Immature Granulocytes 0.2      Immature Grans (Abs) 0.01  Comment:  Mild elevation in immature granulocytes is non specific and   can be seen in a variety of conditions including stress response,   acute inflammation, trauma and pregnancy. Correlation with other   laboratory and clinical findings is essential.        Albumin 3.5 3.7     Alkaline Phosphatase 83 95     ALT 18 19     Anion Gap 14 8     AST 25 24     Baso # 0.02      Basophil% 0.3      Total Bilirubin 1.0  Comment:  For infants and newborns, interpretation of results should be based  on gestational age, weight and in agreement with clinical  observations.  Premature Infant recommended reference ranges:  Up to 24 hours.............<8.0 mg/dL  Up to 48 hours............<12.0 mg/dL  3-5 days..................<15.0 mg/dL  6-29 days.................<15.0 mg/dL   0.9  Comment:  For infants and newborns, interpretation of results should be based  on gestational age, weight and in agreement with clinical  observations.  Premature Infant recommended reference ranges:  Up to 24 hours.............<8.0 mg/dL  Up to 48 hours............<12.0 mg/dL  3-5 days..................<15.0 mg/dL  6-29 days.................<15.0 mg/dL       BUN, Bld 19 24(H)     Calcium 9.8 9.9     Chloride 100 102     CO2 26 28     Creatinine 1.7(H) 1.8(H)     Differential Method Automated      eGFR if  52.8(A) 49.3(A)     eGFR if non  45.7  Comment:  Calculation used to obtain the estimated glomerular filtration  rate (eGFR) is the CKD-EPI equation.   (A) 42.6  Comment:  Calculation used to obtain the estimated glomerular filtration  rate (eGFR) is the CKD-EPI equation.   (A)     Eos # 0.1      Eosinophil% 1.9      Glucose 82 87     Gran # (ANC) 3.7      Gran% 64.8      Hematocrit 37.2(L)      Hemoglobin 11.9(L)      Lymph # 1.2       Lymph% 21.3      Magnesium 1.7      MCH 27.2      MCHC 32.0      MCV 85      Mono # 0.7      Mono% 11.5      MPV 11.5      nRBC 0      Platelets 196      Potassium 3.6 4.1     Total Protein 6.9 7.4     RBC 4.37(L)      RDW 15.6(H)      Sodium 140 138     WBC 5.73            Significant Imaging: Echocardiogram:   2D echo with color flow doppler:   Results for orders placed or performed during the hospital encounter of 03/01/18   2D echo with color flow doppler   Result Value Ref Range    EF 8 (A) 55 - 65    Mitral Valve Regurgitation MODERATE TO SEVERE (A)     Est. PA Systolic Pressure 38.28     Tricuspid Valve Regurgitation MILD      Assessment and Plan:     * Acute decompensated heart failure    Plan for IABP pre-VAD  Access via LEFT CFA  ICU bed requested per primary team    -The risks, benefits & alternatives of the procedure were explained to the patient.    -The risks of moderate sedation include hypotension, respiratory depression, arrhythmias, bronchospasm, & death.    -Informed consent was obtained & the patient is agreeable to proceed with the procedure.  -This patient was discussed with the attending interventional cardiologist who agrees with the above assessment & plan.               VTE Risk Mitigation         Ordered     enoxaparin injection 40 mg  Daily     Route:  Subcutaneous        03/05/18 1121     Low Risk of VTE  Once      03/01/18 1527          Thank you for your consult.    Randell Shultz MD  Interventional Cardiology   [facility]

## 2018-03-06 NOTE — SUBJECTIVE & OBJECTIVE
Interval History: No complaints today, will leave central line in today as patient is going to IABP this afternoon after c-scope with planned LVAD implantation on 3/8 w/ Dr Kaufman. Patient was in good spirits this AM, states he is here, he wants to get this over with and feel better.    Continuous Infusions:   DOBUTamine 5 mcg/kg/min (03/06/18 0502)     Scheduled Meds:   allopurinol  100 mg Oral TID    amiodarone  200 mg Oral Daily    aspirin  81 mg Oral Daily    docusate sodium  100 mg Oral TID    enoxaparin  40 mg Subcutaneous Daily    furosemide  40 mg Intravenous BID    magnesium oxide  800 mg Oral BID    sodium chloride 0.9%  3 mL Intravenous Q8H    vancomycin (VANCOCIN) IVPB  1,000 mg Intravenous Once     PRN Meds:traMADol    Review of patient's allergies indicates:   Allergen Reactions    Lisinopril Anaphylaxis     Objective:     Vital Signs (Most Recent):  Temp: 98.2 °F (36.8 °C) (03/06/18 0719)  Pulse: 95 (03/06/18 0719)  Resp: 16 (03/06/18 0719)  BP: 132/63 (03/06/18 0719)  SpO2: (!) 94 % (03/06/18 0719) Vital Signs (24h Range):  Temp:  [97.6 °F (36.4 °C)-98.8 °F (37.1 °C)] 98.2 °F (36.8 °C)  Pulse:  [] 95  Resp:  [16-18] 16  SpO2:  [94 %-97 %] 94 %  BP: (103-132)/(62-73) 132/63     Patient Vitals for the past 72 hrs (Last 3 readings):   Weight   03/06/18 0708 118.7 kg (261 lb 11 oz)   03/05/18 0700 118.1 kg (260 lb 5.8 oz)   03/04/18 0700 117.7 kg (259 lb 7.7 oz)     Body mass index is 34.53 kg/m².      Intake/Output Summary (Last 24 hours) at 03/06/18 1002  Last data filed at 03/06/18 0500   Gross per 24 hour   Intake             4720 ml   Output              620 ml   Net             4100 ml     Hemodynamic Parameters:    Physical Exam   Constitutional: He appears well-developed and well-nourished. No distress.   HENT:   Head: Normocephalic and atraumatic.   Eyes: Pupils are equal, round, and reactive to light.   Neck: Normal range of motion. No JVD (at clavicle) present.    Cardiovascular: Normal rate.  Exam reveals no friction rub.    No murmur heard.  Pulmonary/Chest: Effort normal. No respiratory distress. He has no wheezes.   Abdominal: Soft. He exhibits no distension.   Musculoskeletal: Normal range of motion. He exhibits no edema.   Neurological: He is alert.   Skin: Skin is warm. Capillary refill takes 2 to 3 seconds. He is not diaphoretic. No erythema.   Psychiatric: He has a normal mood and affect. His behavior is normal. Judgment and thought content normal.     Significant Labs:  CBC:    Recent Labs  Lab 03/04/18  0530 03/05/18  0520 03/06/18  0507   WBC 4.95 5.07 5.73   RBC 4.81 4.68 4.37*   HGB 13.4* 13.2* 11.9*   HCT 41.5 40.0 37.2*    199 196   MCV 86 86 85   MCH 27.9 28.2 27.2   MCHC 32.3 33.0 32.0     BNP:    Recent Labs  Lab 03/01/18  0816 03/05/18  0820   * 1,270*     CMP:    Recent Labs  Lab 03/05/18  0520 03/05/18  1659 03/06/18  0507   GLU 95 87 82   CALCIUM 9.8 9.9 9.8   ALBUMIN 3.8 3.7 3.5   PROT 7.5 7.4 6.9    138 140   K 3.7 4.1 3.6   CO2 28 28 26    102 100   BUN 28* 24* 19   CREATININE 1.9* 1.8* 1.7*   ALKPHOS 110 95 83   ALT 19 19 18   AST 23 24 25   BILITOT 0.7 0.9 1.0      Coagulation:     Recent Labs  Lab 03/01/18  1721   INR 1.1     LDH:  No results for input(s): LDH in the last 72 hours.  Microbiology:  Microbiology Results (last 7 days)     ** No results found for the last 168 hours. **          I have reviewed all pertinent labs within the past 24 hours.    Estimated Creatinine Clearance: 69.4 mL/min (A) (based on SCr of 1.7 mg/dL (H)).    Diagnostic Results:  I have reviewed and interpreted all pertinent imaging results/findings within the past 24 hours.

## 2018-03-06 NOTE — PROGRESS NOTES
Pt and caregiver AAOX3. Spent 45 minutes with pt and caregiver reviewing LVAD education.     Why MCSD is Needed  Your heart failure is defined as a condition in which your heart is unable to pump enough blood to support the basic needs of your body. This can make you feel tired, have abnormal rhythms and shortness of breath, in addition to causing your other organs to fail (e.g. liver or kidneys). You are being offered this treatment option because you have a marked increase risk of irreversible end-organ damage or death. For this reason, you are being considered for placement of a Mechanical Circulatory Support Device (MCSD) at [facility]. The heart pump is designed to take over the pumping action of your heart but before you undergo this procedure, it is important that you and your family understand the options, benefits, risks, and expectations associated with having a MCSD. It is required that you and your proposed caregiver(s) understand and agree with the treatment plan and are willing to participate in the guidelines outlined in the following pages.      ______At this time, you are being considered for a MCSD or more commonly called a Ventricular Assist Device (VAD) for Bridge to Transplantation. Bridge to transplant (BTT) is when a VAD is used to help extend the life of someone waiting for a heart transplant. This is subject to change pending the results from your evaluation and your Physicians decision. This consent pertains only to VAD therapy; you will receive information regarding heart transplantation allocation, procedures, and risks from the transplant program at a different time. Although you are being considered for MCSD implantation for Bridge to Transplantation, it is possible that you will not be a transplant candidate after you receive the MCSD if your medical condition worsens.     OR    _____At this time, you are being considered for a Ventricular Assist Device (VAD) for  Destination Therapy. Destination therapy is when a VAD is used as a long-term treatment for patients who are not candidates for transplant, such as those with end-stage congestive heart failure. In these patients, the pumps are placed permanently to help the heart work better. This is subject to change pending the results from your evaluation and your Physicians decision.      Types of Mechanical Circulatory Support Devices:  A MCSD is a pump that assists or replaces a weakened heart and carries blood to the rest of your body. There are several different types of mechanical circulatory support devices:    -Left ventricular assist devices (LVAD) help the left side of the heart pump blood to the largest artery of the body, the aorta.   -Right ventricular assist devices (RVAD) help the right side of the heart pump blood to the lungs. -Bi-ventricular assist devices (BVAD) help both sides of the heart pump.  -Heartmate II and Heartware are two types of pumps we may consider.  -Total Artificial Heart (DANIELLE) that replaces the heart and pumps blood to the body.    MCSDs can also be categorized into short and long-term therapies. Short-term devices are considered when patients are in cardiogenic shock and need help to pump blood for a few short hours to a few days/ weeks. Long-term devices are used for patients for months to years. Some patients may receive a short-term device prior to implantation of a long-term device.            When Is A VAD Used?  A VAD is used to assist the pumping action of a severely weakened heart. It works with your heart to improve and  to increase blood flow; it does not replace your own heart. When medications can no longer help, and other surgical options have been exhausted, a physician may recommend a VAD. VADs are most often used for patients experiencing New York Heart Association (NYHA) Class III-IV heart failure symptoms.    Alternative Options:  If you are not found to be a candidate for  VAD implantation or if you decide that a VAD is not the best option for you, you will continue to receive customary standard of care. You may also continue optimal medical management alone including the use of inotrope therapy. An inotrope is an IV medication that helps the strength of the hearts contraction. However, the reason that you are being considered for VAD implantation is because optimal medical management has not been adequate and without a VAD, your condition is likely to deteriorate over time. While going straight to transplant may be a possibilition, death is a possibility as well.    You May Not Be Eligible To Receive A VAD If You Are Found To Have Any Of The Following:   Any irreversible non-cardiac disease state with less than 2 year survival rate   Inadequate social support to be successful at home after surgery   Irreversible pulmonary disease or fixed pulmonary hypertension   Irreversible renal disease   Irreversible liver disease   Unresolved stroke or uncorrected cerebral vascular disease   A history of chronic noncompliance   Using illicit drugs or alcohol   Uncorrected thyroid disease   Significant right ventricular failure   Obstructive or restrictive cardiomyopathy   Amyloidosis   Active pericardial disease   Untreated aortic aneurysm   Irreversible cognitive dysfunction   History of psychiatric disease, uncontrolled affective disorder, or any cognitive dysfunction that may prevent you     from managing self-care   Diabetes with severe retinopathy or peripheral neuropathy   Obesity with BMI (body mass index) greater than 35   Severe chronic malnutrition   Uncorrected blood disorders   Active uncontrolled infection   Pregnancy (positive pregnancy test)   HIV positive or immunosuppression that could result in device infection   Muscular dystrophy, MS or similar disease states    Active systemic infection   Cancer   Insufficient funding      Possible Benefits:  The  overall goal is improved health and quality of life. In most cases, because circulation has been restored as a result of the VAD, you can expect to have more energy and also experience less heart failure symptoms. Since VADs help deliver more oxygen rich blood, you may feel well enough to resume many of the usual activities and hobbies that you enjoy. In fact, many patients return to work and are no longer disabled, depending on the type of work they do. Be aware that you may lose your disability post VAD based on review of your records and disability benefits. It is important that you speak with a  so that you may plan for this should it occur.   The improved circulation may prolong life and may improve some organ damage caused by your heart failure. This is supported by some studies that have shown that VAD patients have a longer survival than patients treated with medications alone. Although the VAD can improve your chances for survival, the type and severity of your heart disease may outweigh any benefits from the device and you may still die.    Possible Risks:  As with any surgery or procedure, there are risks and the possibility of complications. There are also risks related to the operation itself and undergoing anesthesia, and risks related to the device itself. You will discuss the risks in detail with the Cardiac Surgeon who intends to perform your surgery. Below is a list of risks related to the surgery and the VAD.                                           [facility]                                  INTERMACS   Bleeding                              13.2%             13.5%   Device Malfunction               4.3 3.7%   Infection                               11.1% 15.2%   Neurological Dysfunction     2.3% 3.7%   Rehospitalization                  49.3% 32.2%   Renal Dysfunction                2.9% 2.4%   Right Heart Failure                3.1% 3.0%       Anesthesia Risks:   During  the surgery you will be put under general anesthesia, which means you will be given medications to put you to sleep, block pain, and paralyze parts of your body. You will also be placed on a machine to help you breathe. The anesthesiologist will talk with you in more detail about the risks of anesthesia. At the time of surgery you will be required to sign a consent form for anesthesia.    Operative Procedure Risks:  There are many risks with this operation including but not limited to: death, heart attack, stroke, nerve injury, blood clots, damage to arteries needing limb amputation, bleeding and hemorrhage, hemolysis, infection, development of new antibodies in your blood, mediastinitis, arrhythmia, right heart failure, heart block, or the need for pacemaker or ICD implantation. Pump exchange due to complications with the pump is a possibility as well. In addition, the need for re-operation for any cause, renal, hepatic or pulmonary failure resulting in death or long-term need of ventilation or dialysis, and blood transfusion with its risk of HIV, and hepatitis. Studies have also shown that patients may have problems with memory, attention, and speed of processing thoughts after a cardiac surgical procedure. Any of these complications will be explained to you in more detail if you desire. In addition to these potential complications, there may be other risks that are currently unknown. The longer you are on the device, the greater the chances that complications will develop. It is also possible that you may reach a point where your quality of life is so impaired, that the decision to terminate your VAD-support will need to be addressed.     VAD Therapy Risks (After The Surgery):  Include but not limited to: death, need for re-operation, device malfunction or device infection, renal failure requiring dialysis, blood clots, stroke, pain, or bleeding. Pump exchange due to complications is a possibility. Patients may  also experience a potential decrease in their quality of life including limitations of their normal activities. In addition, 24-hour caregivers may experience increased stress in their day-to-day life as a result of caring for a loved one with a VAD.            Evaluation Process:  There will be many people involved in the evaluation process to assure that this is the best choice for you. You will receive a number of tests and consultations. Some of the people that may help evaluate you include Heart Failure Physicians, Cardiac Surgeons, Social Workers and VAD Coordinators. During this process, you will be given education about the VAD and the care that you would require. After the evaluation, the group will decide if you meet the criteria to have a VAD implanted. You may require or have already been implanted with a short-term VAD prior to surgery for a long-term VAD. Additional people you well meet include a financial counselor to discuss insurance and dressing supplies, research coordinators, anesthesiologist, psychiatrist/psychologist, physical therapist or occupational therapist to discuss rehabilitation after VAD, and dietician to improve nutrition. Some patients may be referred to other services for consultation. These may include, but are not limited to: infectious disease doctor, gastroenterologist, nephrologist (kidney doctor), pulmonologist (lung doctor), hepatologist (liver doctor), or an ophthalmologist (eye doctor). It is recommended that you see your dentist to ensure no infection is present and all needed dental work is completed before surgery. Cavities and rotten teeth can cause an infection which can lead to death.    Testing is required to determine VAD candidacy. These include but are not limited to the following:   Laboratory studies of blood and urine, chest x-ray, abdominal ultrasound, CT scan, echocardiogram, cardiopulmonary treadmill, right heart catheterization, electrocardiogram,  pulmonary function tests, colonoscopy or endoscopy, vascular studies, and pulmonary studies. Additionally, women will need to have an up-to-date mammogram and PAP test.     Device Choice:  VADs are currently approved by the Food and Drug Administration (FDA) to be used as Bridge to Transplantation (BTT) or Destination Therapy (DT). A full list will be provided for you and your family to review if desired. [facility] also participates in clinical trials with devices that are considered investigational, and are not yet FDA approved for BTT/DT. Your Surgeon and Cardiologist will discuss with you which device is the best option for you. VADs have four main parts: the implantable heart pump, a tube that passes through the skin of your abdomen (driveline), a controller (small computer) that controls the pump operation, and an external power source (batteries or power device). In addition, there are other VADs that are used temporarily when patients are in cardiogenic shock.    You have the right to refuse surgery at any time. This consent will help you to make an informed decision. If you choose that this is not the best option for you at this time, you may choose to be re-evaluated at a later time and you may choose to receive the implant at a later time if you are still a candidate.    Pre-operative Care expectations:   Informed surgical and anesthesia consents will be completed.   You will be admitted to the hospital a few days before the day of surgery for optimization before surgery (unless      already hospitalized).   Intra-aortic balloon pump or impella will be placed before surgery,    Status change if waiting for a transplant and what this means.   Your ICD will be turned off prior to surgery, and then turned back on after surgery. It will NOT be removed.   ICU (intensive care unit) visiting hours FAMILY CAN NOT STAY AT NIGHT (pamphlet given to them)    Names, addresses and phone numbers of local  MD, EMS with [place] [employer], Electrical company and pharmacy           will be required to be provided prior to the LVAD surgery.     Need for Coumadin and frequent blood work    Importance of medication compliance    Go over booklet/websites available    Learning the material for the device        Surgical Procedure:  The surgical procedure to implant the VAD will require open-heart surgery and can take on average between 6-12 hours. The surgeon will need to make an incision down the front of your chest to reach your heart. You will have a breathing tube (endotracheal or ET tube) and be under general anesthesia. The VAD is placed below the heart and the surgeon will connect the pump to your heart and secure it in place with sutures. Once the pump is in place, the VAD along with your natural heart will resume pumping blood through your body. After the surgery is completed, you will return to the ICU.    Post-Operative Care Expectations:  Upon arrival to the ICU, you will receive close monitoring and support from the following medical mechanisms:   Heart monitor (telemetry) to monitor heart rate and rhythm.   A breathing tube (endotracheal tube) to assist with breathing and maintain and open airway.   An oral-gastric tube will be utilized to keep the stomach empty when connected to suction, as well as to give the    nursing staff the capability to administer oral medications directly into the stomach.   A Lagunas catheter to measure urinary output.   A Yuma-Tito Catheter to measure pressures within the heart and lungs.   An arterial line catheter in order to measure arterial blood pressure.   Chest tubes to collect and measure drainage from surgery.   A VAD driveline that exits the skin in the abdominal area and is connected to the VAD power source.   Temporary pacemaker wires which may aid in the event of an arrhythmia associated with heart surgery.    Your chest may be left open for 24 to 72 hours  after implantation, after which you will be taken back to the operating room to close your chest.    You will receive medications for sedation and to control your pain in order to achieve a tolerable level of comfort. You will also be on IV medications until your blood pressure and fluid status are stable. Your home medications will be resumed as soon as possible if still medically relevant. In addition to your previous taken medications, patients with VADs are commonly prescribed medications for anticoagulation/anti-platelet, antibiotics, blood pressure, and vitamin/mineral supplements. Your length of stay in the ICU will depend on how fast you recover. Once you are more stable, breathing on your own with your lines and tubes out, you will be transferred to a general care unit where you can expect to stay for another 1-3 weeks. On average, your total length of stay will be about three or four weeks after your surgery. During this time, it is expected that you and your family will begin to learn to manage the device and learn how to manage your care at home. Most patients are able to return home after VAD implantation, but this cannot be guaranteed. Complications may require a prolonged period of hospitalization, long term acute care facility, or inpatient rehabilitation stay locally.  If you are unattended and the device fails, you may not be able to perform the emergency procedures yourself, which could result in death and/or blood clots in the device.    Education:  Verbal, written, and visual educational materials are provided throughout your hospitalization and are available to anyone involved in your care at home. You and your caregiver(s) will be trained by a VAD Coordinator on how to manage your care and device. Other staff such as your bedside Nurse, the Occupational and Physical Therapists will also provide training to you. You and your caregiver(s) must show ability to manage the device, understand how it  operates, troubleshoot problems, and care for your driveline exit site. It is expected that a caregiver(s) will be present and available while you are in the hospital to learn how to manage the device and how to care for you when you are at home. The education will be an ongoing process while you are here at the hospital. Near the time of your expected discharge, your family and/or caregivers will be required to show competency in the care and management of you and your VAD. Once it has been determined that you and your caregiver(s) are competent in your VAD care, you will participate in an excursion around the hospital with your caregiver for 6 hours. In addition, a VAD Coordinator will ensure that your local fire department, emergency personnel, and any other community members will be given education materials and training as necessary. Your home must have consistent electricity and phone services; the outlets must be three pronged and grounded. Any additional safety needs are arranged during this time. You will need to have your glasses and hearing aids at the hospital in order to be educated, as soon as possible.   Caregiver Requirements:  Both before and after VAD implantation, a caregiver/support person MUST be able to attend comprehensive education sessions conducted by the VAD coordinators. You MUST come at a specified time that is agreed upon by the caregiver/support person and the VAD coordinator. We respectfully request this to be between 9am and 4pm on Monday to Friday. These are not scheduled sessions, but rather constant education to the caregiver and patient. If there are multiple caregiver/support people that need to learn the device, they need to all be present at the SAME TIME for education sessions. There will be only ONE designated caregiver to learn the dressing changes. This caregiver MUST perform at least FIVE dressing changes before they are considered competent to change a dressing by  themselves. If patient unable to be checked off before discharge, they may need 24 hour care.    Discharge Process:  Your daily progress will be followed by a team of people involved in your care including your Surgeon, Cardiologist, VAD Coordinators, Staff Nurses, Nurse Practitioners, Physician Assistants, Physical/Occupational Therapy, Social Workers, and a Discharge Planner. They will monitor your recovery and help you to adjust to life with a VAD. You must have a thermometer, weight scale, flashlight, and a blood pressure cuff at your home. Soon after your surgery, it will be very important to begin preparing for your discharge. You will have to be both physically recovered and show competence in the management of your VAD to be discharged. Most patients return home after successful outings; however, some patients choose to live with a caregiver or need a rehabilitation facility for a short period before returning home. If insurance allows, a Home Health nurse will be recommended to come to your home for two weeks and assist you in your care when you return home. The length of time that the Visiting Nurse will come to your home will depend on your overall recovery. It is recommended after you return home that you enroll in a Cardiac Rehab program to continue to improve your physical health.    Follow up care:  After you are discharged, you will follow up with your Surgeon, your Heart Failure Cardiologist and your VAD coordinator. They will collaboratively care for you and make decisions about your treatment. Typically, your first visit will be 1 week after discharge. We will see you every week for 3-4 weeks, followed by every 2 weeks for 1 month, then monthly thereafter while you have the VAD. You may be required to stay close to our medical center depending on how things are going. Once you are considered a stable established patient, your Physicians may decide that you can follow-up every 2-3 months. Along  with seeing your Cardiologist and Surgeon, you will have laboratory testing, and other physiological testing done on a regular basis in order to monitor and maintain your progress and health. The types of testing that you may need and the frequency will be decided by your Physicians but can include blood tests, EKG, Echocardiogram, Right Heart Catheterization, V02 Treadmill Stress Test, and Implantable Cardioverter Defibrillator (ICD) device check. If you have received an investigational VAD, you will have other testing that will be required for the research study. Many VADs require patients to take anticoagulation medications, also known as blood thinning medications (coumadin, warfarin). You will be required to have frequent blood draws, up to 2-3 times a week, in order to monitor your blood count and blood thinning agents. You will also be in frequent contact with a VAD Coordinator who will make phone calls to assess how you are doing at home and assist you with any problems that may arise. A VAD Coordinator and a Heart Failure Cardiologist are also available 24 hours a day in the event that you have an emergency. On average, you can expect that within 12 weeks after surgery, you will be able to return to most activities, with the permission of your VAD Team.    Lifestyle Changes:  You will have few limitations and can resume most usual activities. Certain activities are hazardous or fatal after implant. Persons with implantable VADs must not allow their controller/computer and electrical equipment to submerge in water. Showering is possible with proper protective equipment. You may only resume showering once your driveline has healed and your VAD coordinator gives their permission. Swimming and baths are prohibited. You cannot sleep on your stomach or the side the driveline is exiting your abdomen. Contact sports, repetitive jumping, or impact with an airbag are examples of activities that may cause trauma to  the pump attachments and must be avoided.         You may be sexually active but must care for your driveline. Female patients cannot get pregnant. Medical care after implant includes lifetime follow up to monitor device function and health status. You may not have a magnetic resonance imaging (MRI) test because of the magnetic fields. You may not vacuum, touch television or computer screens, or take hot clothes out of the dryer due to the static electricity. VAD therapy requires significant self-care responsibility and a willingness to participate with you VAD team. Driveline exit site dressings must be performed daily using sterile technique or as directed by the VAD team. Maintenance care of the device components, batteries, and driveline is necessary to prevent pump failure, infections, or other serious complications. You will continue to take medications for your heart failure although the dose and or medication name may change. You may continue to take your diuretic such as lasix or demedex. You may also continue to be on a fluid restriction. You may drive once approved by your VAD team. You may be able to travel with your VAD, depending on the situation.      VAD Equipment:  Along with the device that is implanted inside your body, you will have a number of other external pieces of equipment that will require care and maintenance. You will have a driveline that exits your body through your abdomen that will power a controller, which is the computer component that tells the heart pump how to perform. The controller will also tell you about alarms, sounds, and words, on how your pump is operating and if there are any problems. In order to power the device and the controller, you will have batteries and a battery charger and/or power device. The batteries allow you to be mobile and move freely without being attached to outlet power. The  or power device allows you to be connected to power for long  periods of time such as when you are sleeping. Different [facility]SDs have similar pieces of equipment, but will vary depending on the device you receive. You will receive education and teaching before you leave the hospital to make sure you understand clearly how to operate the equipment and troubleshoot problems that may occur.    Confidentiality:  Hospital personnel who are involved in the course of your care may review your medical record. Without your authorization communication between [facility] and [facility] remains confidential. If you do become a candidate for transplant, data about your case, which will include your identity, will be sent to [employer] ([employer]) and may be sent to other places involved in the transplant process as permitted by law. Data about your VAD, which will include your identity, will be shared with the  of the device. Your information may also be entered into a registry for pump implants, known as INTERMACS. Your participation in this is voluntary, and will be discussed at greater length prior to implant.    End of Life:  If you are approaching the end of life, the VAD can be turned off. You may be in a hospital, home, or hospice setting. If you become very sick and do not have a chance to survive, we may talk about stopping the pump. The doctor would talk to you or your family about what is right for you. It is helpful to talk to your family about your goals before surgery so they know what your wishes are. A member of our Goals of Care team will visit you before surgery to help you learn your goals. You have the right to have the device turned off or to decline pump exchange if your pump fails or malfunctions.     Device Return:  At the time of either transplant or death, the surgeon may need to remove the device to return to the . You or a family member may need to sign a separate consent for removal of the device.

## 2018-03-06 NOTE — TRANSFER OF CARE
"Anesthesia Transfer of Care Note    Patient: Tim Richards    Procedure(s) Performed: Procedure(s) (LRB):  COLONOSCOPY (N/A)    Patient location: Cath Lab    Anesthesia Type: general    Transport from OR: Transported from OR on room air with adequate spontaneous ventilation    Post pain: adequate analgesia    Post assessment: no apparent anesthetic complications    Post vital signs: stable    Level of consciousness: awake    Nausea/Vomiting: no nausea/vomiting    Complications: none    Transfer of care protocol was followed      Last vitals:   Visit Vitals  /70 (BP Location: Left arm, Patient Position: Lying)   Pulse 96   Temp 36.8 °C (98.2 °F) (Temporal)   Resp 18   Ht 6' 1" (1.854 m)   Wt 118.7 kg (261 lb 11 oz)   SpO2 100%   BMI 34.53 kg/m²     "

## 2018-03-06 NOTE — ANESTHESIA POSTPROCEDURE EVALUATION
"Anesthesia Post Evaluation    Patient: Tim Richards    Procedure(s) Performed: Procedure(s) (LRB):  COLONOSCOPY (N/A)    Final Anesthesia Type: general  Patient location during evaluation: GI PACU  Patient participation: Yes- Able to Participate  Level of consciousness: awake and alert and oriented  Post-procedure vital signs: reviewed and stable  Pain management: adequate  Airway patency: patent  PONV status at discharge: No PONV  Anesthetic complications: no      Cardiovascular status: blood pressure returned to baseline and hemodynamically stable  Respiratory status: unassisted, spontaneous ventilation and room air  Hydration status: euvolemic  Follow-up not needed.        Visit Vitals  /70 (BP Location: Left arm, Patient Position: Lying)   Pulse 96   Temp 36.8 °C (98.2 °F) (Temporal)   Resp 18   Ht 6' 1" (1.854 m)   Wt 118.7 kg (261 lb 11 oz)   SpO2 100%   BMI 34.53 kg/m²       Pain/Iker Score: Pain Assessment Performed: Yes (3/6/2018  1:42 PM)  Presence of Pain: complains of pain/discomfort (3/6/2018  1:42 PM)  Pain Rating Prior to Med Admin: 7 (3/6/2018  5:12 AM)  Pain Rating Post Med Admin: 2 (3/5/2018 10:52 PM)      "

## 2018-03-06 NOTE — SUBJECTIVE & OBJECTIVE
Past Medical History:   Diagnosis Date    CHF (congestive heart failure)     Dilated cardiomyopathy 1/10/2018    Disorder of kidney and ureter     CKD    Gout     HTN (hypertension)     Ventricular tachycardia (paroxysmal)        Past Surgical History:   Procedure Laterality Date    CARDIAC CATHETERIZATION  Dec. 2012    CARDIAC DEFIBRILLATOR PLACEMENT Left     CRRT-D       Review of patient's allergies indicates:   Allergen Reactions    Lisinopril Anaphylaxis       PTA Medications   Medication Sig    allopurinol (ZYLOPRIM) 100 MG tablet TAKE 1 TABLET (100 MG TOTAL) BY MOUTH 3 (THREE) TIMES DAILY.    amiodarone (PACERONE) 200 MG Tab TAKE 1 TABLET (200 MG TOTAL) BY MOUTH ONCE DAILY.    aspirin (ECOTRIN) 81 MG EC tablet Take 81 mg by mouth. 1 Tablet, Delayed Release (E.C.) Oral Every day    bumetanide (BUMEX) 2 MG tablet Take 1 tablet (2 mg total) by mouth 2 (two) times daily.    losartan (COZAAR) 25 MG tablet Take 25 mg by mouth once daily.    potassium chloride SA (K-DUR,KLOR-CON) 10 MEQ tablet Take 2 tablets (20 mEq total) by mouth 2 (two) times daily.    spironolactone (ALDACTONE) 25 MG tablet Take 1 tablet (25 mg total) by mouth once daily.    ERGOCALCIFEROL, VITAMIN D2, (VITAMIN D ORAL) Take by mouth once daily.    metOLazone (ZAROXOLYN) 2.5 MG tablet Take 2.5 mg by mouth every other day.    metoprolol succinate (TOPROL-XL) 25 MG 24 hr tablet Take 0.5 tablets (12.5 mg total) by mouth once daily.    valsartan (DIOVAN) 80 MG tablet Take 0.5 tablets (40 mg total) by mouth 2 (two) times daily.    VITAMIN E ACETATE (VITAMIN E ORAL) Take by mouth once daily.     Family History     Problem Relation (Age of Onset)    Cancer Sister (54)    Coronary artery disease Father    Diabetes Father    Heart disease Father    Hypertension Father    No Known Problems Mother, Brother        Social History Main Topics    Smoking status: Former Smoker     Packs/day: 1.00     Years: 31.00     Types: Cigarettes      Quit date: 1/12/2018    Smokeless tobacco: Never Used      Comment: pt is quiting on his own - pt stated not qualified for program; 2/16 pt  quit on his own    Alcohol use No      Comment: one fifth of gin or rum/week    Drug use: No    Sexual activity: Yes     Partners: Female     Birth control/ protection: None      Comment: 10/5/17  with same partner 7 years      Review of Systems   Constitution: Negative for chills, diaphoresis, fever and weight loss.   HENT: Negative for congestion, hoarse voice and sore throat.    Eyes: Negative for blurred vision and double vision.   Respiratory: Negative for shortness of breath.    Endocrine: Negative for cold intolerance and heat intolerance.   Hematologic/Lymphatic: Does not bruise/bleed easily.   Musculoskeletal: Positive for gout.   Gastrointestinal: Negative for abdominal pain, constipation, diarrhea, nausea and vomiting.   Genitourinary: Negative for dysuria.   Neurological: Negative for focal weakness and sensory change.     Objective:     Vital Signs (Most Recent):  Temp: 98.2 °F (36.8 °C) (03/06/18 0719)  Pulse: 95 (03/06/18 0719)  Resp: 16 (03/06/18 0719)  BP: 132/63 (03/06/18 0719)  SpO2: (!) 94 % (03/06/18 0719) Vital Signs (24h Range):  Temp:  [97.6 °F (36.4 °C)-98.8 °F (37.1 °C)] 98.2 °F (36.8 °C)  Pulse:  [] 95  Resp:  [16-18] 16  SpO2:  [94 %-97 %] 94 %  BP: (103-132)/(62-73) 132/63     Weight: 118.7 kg (261 lb 11 oz)  Body mass index is 34.53 kg/m².    SpO2: (!) 94 %  O2 Device (Oxygen Therapy): room air      Intake/Output Summary (Last 24 hours) at 03/06/18 0951  Last data filed at 03/06/18 0500   Gross per 24 hour   Intake             4720 ml   Output              620 ml   Net             4100 ml       Lines/Drains/Airways     Central Venous Catheter Line                 Percutaneous Central Line Insertion/Assessment - triple lumen  03/01/18 1800 right internal jugular 4 days                Physical Exam   Constitutional: He is oriented to  person, place, and time. He appears well-developed and well-nourished.   HENT:   Head: Normocephalic and atraumatic.   Eyes: EOM are normal. Pupils are equal, round, and reactive to light.   Neck: No JVD present.   Cardiovascular: Regular rhythm.  Tachycardia present.  Exam reveals gallop and S3. Exam reveals no friction rub.    No murmur heard.  Pulses:       Radial pulses are 2+ on the right side, and 2+ on the left side.        Femoral pulses are 2+ on the right side, and 2+ on the left side.       Popliteal pulses are 2+ on the right side, and 2+ on the left side.        Dorsalis pedis pulses are 2+ on the right side, and 2+ on the left side.        Posterior tibial pulses are 1+ on the right side, and 1+ on the left side.   Pulmonary/Chest: Effort normal and breath sounds normal. No respiratory distress. He has no wheezes.   Abdominal: Soft. Bowel sounds are normal. He exhibits no distension.   Musculoskeletal: He exhibits no edema.   Neurological: He is alert and oriented to person, place, and time.   Skin: Skin is warm and dry.   Psychiatric: He has a normal mood and affect.       Significant Labs:   Recent Lab Results       03/06/18  0507 03/05/18  1659      Immature Granulocytes 0.2      Immature Grans (Abs) 0.01  Comment:  Mild elevation in immature granulocytes is non specific and   can be seen in a variety of conditions including stress response,   acute inflammation, trauma and pregnancy. Correlation with other   laboratory and clinical findings is essential.        Albumin 3.5 3.7     Alkaline Phosphatase 83 95     ALT 18 19     Anion Gap 14 8     AST 25 24     Baso # 0.02      Basophil% 0.3      Total Bilirubin 1.0  Comment:  For infants and newborns, interpretation of results should be based  on gestational age, weight and in agreement with clinical  observations.  Premature Infant recommended reference ranges:  Up to 24 hours.............<8.0 mg/dL  Up to 48 hours............<12.0 mg/dL  3-5  days..................<15.0 mg/dL  6-29 days.................<15.0 mg/dL   0.9  Comment:  For infants and newborns, interpretation of results should be based  on gestational age, weight and in agreement with clinical  observations.  Premature Infant recommended reference ranges:  Up to 24 hours.............<8.0 mg/dL  Up to 48 hours............<12.0 mg/dL  3-5 days..................<15.0 mg/dL  6-29 days.................<15.0 mg/dL       BUN, Bld 19 24(H)     Calcium 9.8 9.9     Chloride 100 102     CO2 26 28     Creatinine 1.7(H) 1.8(H)     Differential Method Automated      eGFR if  52.8(A) 49.3(A)     eGFR if non  45.7  Comment:  Calculation used to obtain the estimated glomerular filtration  rate (eGFR) is the CKD-EPI equation.   (A) 42.6  Comment:  Calculation used to obtain the estimated glomerular filtration  rate (eGFR) is the CKD-EPI equation.   (A)     Eos # 0.1      Eosinophil% 1.9      Glucose 82 87     Gran # (ANC) 3.7      Gran% 64.8      Hematocrit 37.2(L)      Hemoglobin 11.9(L)      Lymph # 1.2      Lymph% 21.3      Magnesium 1.7      MCH 27.2      MCHC 32.0      MCV 85      Mono # 0.7      Mono% 11.5      MPV 11.5      nRBC 0      Platelets 196      Potassium 3.6 4.1     Total Protein 6.9 7.4     RBC 4.37(L)      RDW 15.6(H)      Sodium 140 138     WBC 5.73            Significant Imaging: Echocardiogram:   2D echo with color flow doppler:   Results for orders placed or performed during the hospital encounter of 03/01/18   2D echo with color flow doppler   Result Value Ref Range    EF 8 (A) 55 - 65    Mitral Valve Regurgitation MODERATE TO SEVERE (A)     Est. PA Systolic Pressure 38.28     Tricuspid Valve Regurgitation MILD

## 2018-03-06 NOTE — NURSING TRANSFER
Nursing Transfer Note      3/6/2018     Transfer To: [id number]    Transfer via bed    Transfer with 2 L NC to O2, cardiac monitoring    Transported by Cath lab RNs and perfusionist    Medicines sent: n/a    Chart send with patient: Yes    Notified: family of pt's arrival to [facility]    Patient reassessed at: Allyson RN present at bedside for arrival. Assessment and handoff completed. All questions answered. VSS.     Upon arrival to floor: cardiac monitor applied, patient oriented to room, call bell in reach and bed in lowest position

## 2018-03-06 NOTE — PROGRESS NOTES
[facility] TRANSFUSION MEDICINE  Section of Transfusion Medicine and Histocompatibility  HLA Note    Case Details   Diagnosis:  No primary diagnosis found.  Blood Type: O POS  HLA Type:   Class I:  Lab Results   Component Value Date    XHYB4SP 30 01/23/2018    CBIR2ZO 32 01/23/2018    GDVJ1OM 7 01/23/2018    DVGF4EX 72 01/23/2018    QYFRH4IZ 6 01/23/2018    YGSOE5DE XX 01/23/2018    OHPAJ9EX 2 01/23/2018    AJKTZ5MV 15 01/23/2018     Class II:  Lab Results   Component Value Date    JJZFHG95JH 17 01/23/2018    QUGDNG55UT 8 01/23/2018    OOFOCE799LE 52 01/23/2018    SOZAHJ9744 XX 01/23/2018    GIKID4XZ 2 01/23/2018    VSFEO9ZX 7 01/23/2018     Recent Antibody Screen/ID Results:   Lab Results   Component Value Date    WW6YHFT Negative 01/23/2018    CIIAB Negative 01/23/2018     Auto T Cell Crossmatch Results:  Lab Results   Component Value Date    XMTCELLRES Negative 01/23/2018     Auto B Cell Crossmatch Results:  Lab Results   Component Value Date    BCELLRES Negative 01/23/2018     Assessment     Interpretation: The HLA antibody studies are negative, and, therefore, no unacceptable antigens need be listed. A virtual crossmatch is recommended.    Strongly Recommended Unacceptable Antigens: None    Crossmatch Expectations: A flow cytometric crossmatch is expected to be negative.  Please call the HLA Lab [phone] with any concerns or questions.  MELANIE Das MD, TEOFILO  Section of Transfusion Medicine & Histocompatibility  Department of Pathology and Laboratory Medicine  [facility]  03/06/2018

## 2018-03-07 ENCOUNTER — COMMITTEE REVIEW (OUTPATIENT)
Dept: TRANSPLANT | Facility: CLINIC | Age: 53
End: 2018-03-07

## 2018-03-07 ENCOUNTER — ANESTHESIA EVENT (OUTPATIENT)
Dept: SURGERY | Facility: HOSPITAL | Age: 53
DRG: 001 | End: 2018-03-07
Payer: COMMERCIAL

## 2018-03-07 ENCOUNTER — RESEARCH ENCOUNTER (OUTPATIENT)
Dept: RESEARCH | Facility: HOSPITAL | Age: 53
End: 2018-03-07

## 2018-03-07 PROBLEM — R57.0 CARDIOGENIC SHOCK: Status: ACTIVE | Noted: 2018-03-07

## 2018-03-07 PROBLEM — Z51.5 PALLIATIVE CARE ENCOUNTER: Status: ACTIVE | Noted: 2018-03-07

## 2018-03-07 LAB
ABO + RH BLD: NORMAL
ALBUMIN SERPL BCP-MCNC: 3.4 G/DL
ALBUMIN SERPL BCP-MCNC: 3.4 G/DL
ALLENS TEST: ABNORMAL
ALP SERPL-CCNC: 81 U/L
ALP SERPL-CCNC: 85 U/L
ALT SERPL W/O P-5'-P-CCNC: 18 U/L
ALT SERPL W/O P-5'-P-CCNC: 19 U/L
ANION GAP SERPL CALC-SCNC: 11 MMOL/L
ANION GAP SERPL CALC-SCNC: 11 MMOL/L
AST SERPL-CCNC: 27 U/L
AST SERPL-CCNC: 28 U/L
BASOPHILS # BLD AUTO: 0.01 K/UL
BASOPHILS NFR BLD: 0.2 %
BILIRUB SERPL-MCNC: 1.2 MG/DL
BILIRUB SERPL-MCNC: 1.4 MG/DL
BLD GP AB SCN CELLS X3 SERPL QL: NORMAL
BNP SERPL-MCNC: 1110 PG/ML
BUN SERPL-MCNC: 16 MG/DL
BUN SERPL-MCNC: 16 MG/DL
CALCIUM SERPL-MCNC: 9.5 MG/DL
CALCIUM SERPL-MCNC: 9.7 MG/DL
CHLORIDE SERPL-SCNC: 101 MMOL/L
CHLORIDE SERPL-SCNC: 99 MMOL/L
CO2 SERPL-SCNC: 24 MMOL/L
CO2 SERPL-SCNC: 26 MMOL/L
CREAT SERPL-MCNC: 1.6 MG/DL
CREAT SERPL-MCNC: 1.6 MG/DL
DELSYS: ABNORMAL
DIFFERENTIAL METHOD: ABNORMAL
EOSINOPHIL # BLD AUTO: 0.1 K/UL
EOSINOPHIL NFR BLD: 1.1 %
ERYTHROCYTE [DISTWIDTH] IN BLOOD BY AUTOMATED COUNT: 15.6 %
EST. GFR  (AFRICAN AMERICAN): 56.8 ML/MIN/1.73 M^2
EST. GFR  (AFRICAN AMERICAN): 56.8 ML/MIN/1.73 M^2
EST. GFR  (NON AFRICAN AMERICAN): 49.2 ML/MIN/1.73 M^2
EST. GFR  (NON AFRICAN AMERICAN): 49.2 ML/MIN/1.73 M^2
FIO2: 21
GLUCOSE SERPL-MCNC: 95 MG/DL
GLUCOSE SERPL-MCNC: 96 MG/DL
HCO3 UR-SCNC: 30.8 MMOL/L (ref 24–28)
HCT VFR BLD AUTO: 35.8 %
HGB BLD-MCNC: 11.2 G/DL
IMM GRANULOCYTES # BLD AUTO: 0.02 K/UL
IMM GRANULOCYTES NFR BLD AUTO: 0.3 %
LYMPHOCYTES # BLD AUTO: 0.7 K/UL
LYMPHOCYTES NFR BLD: 12 %
MAGNESIUM SERPL-MCNC: 1.8 MG/DL
MCH RBC QN AUTO: 27.3 PG
MCHC RBC AUTO-ENTMCNC: 31.3 G/DL
MCV RBC AUTO: 87 FL
MODE: ABNORMAL
MONOCYTES # BLD AUTO: 0.9 K/UL
MONOCYTES NFR BLD: 14.4 %
NEUTROPHILS # BLD AUTO: 4.5 K/UL
NEUTROPHILS NFR BLD: 72 %
NRBC BLD-RTO: 0 /100 WBC
PCO2 BLDA: 50.9 MMHG (ref 35–45)
PH SMN: 7.39 [PH] (ref 7.35–7.45)
PLATELET # BLD AUTO: 182 K/UL
PMV BLD AUTO: 11.4 FL
PO2 BLDA: 29 MMHG (ref 40–60)
POC BE: 6 MMOL/L
POC SATURATED O2: 53 % (ref 95–100)
POC TCO2: 32 MMOL/L (ref 24–29)
POTASSIUM SERPL-SCNC: 3.6 MMOL/L
POTASSIUM SERPL-SCNC: 3.8 MMOL/L
PROT SERPL-MCNC: 7 G/DL
PROT SERPL-MCNC: 7 G/DL
RBC # BLD AUTO: 4.1 M/UL
SAMPLE: ABNORMAL
SITE: ABNORMAL
SODIUM SERPL-SCNC: 136 MMOL/L
SODIUM SERPL-SCNC: 136 MMOL/L
WBC # BLD AUTO: 6.19 K/UL

## 2018-03-07 PROCEDURE — 25000003 PHARM REV CODE 250: Mod: NTX | Performed by: PHYSICIAN ASSISTANT

## 2018-03-07 PROCEDURE — 25000003 PHARM REV CODE 250: Performed by: STUDENT IN AN ORGANIZED HEALTH CARE EDUCATION/TRAINING PROGRAM

## 2018-03-07 PROCEDURE — 83880 ASSAY OF NATRIURETIC PEPTIDE: CPT

## 2018-03-07 PROCEDURE — 63600175 PHARM REV CODE 636 W HCPCS: Mod: NTX | Performed by: STUDENT IN AN ORGANIZED HEALTH CARE EDUCATION/TRAINING PROGRAM

## 2018-03-07 PROCEDURE — 86901 BLOOD TYPING SEROLOGIC RH(D): CPT

## 2018-03-07 PROCEDURE — 86920 COMPATIBILITY TEST SPIN: CPT

## 2018-03-07 PROCEDURE — A4216 STERILE WATER/SALINE, 10 ML: HCPCS | Mod: NTX | Performed by: PHYSICIAN ASSISTANT

## 2018-03-07 PROCEDURE — 87070 CULTURE OTHR SPECIMN AEROBIC: CPT

## 2018-03-07 PROCEDURE — 99233 SBSQ HOSP IP/OBS HIGH 50: CPT | Mod: ,,, | Performed by: INTERNAL MEDICINE

## 2018-03-07 PROCEDURE — 20600001 HC STEP DOWN PRIVATE ROOM

## 2018-03-07 PROCEDURE — 85025 COMPLETE CBC W/AUTO DIFF WBC: CPT

## 2018-03-07 PROCEDURE — 94761 N-INVAS EAR/PLS OXIMETRY MLT: CPT | Mod: NTX

## 2018-03-07 PROCEDURE — 63600175 PHARM REV CODE 636 W HCPCS: Performed by: INTERNAL MEDICINE

## 2018-03-07 PROCEDURE — C1751 CATH, INF, PER/CENT/MIDLINE: HCPCS

## 2018-03-07 PROCEDURE — 99222 1ST HOSP IP/OBS MODERATE 55: CPT | Mod: ,,, | Performed by: CLINICAL NURSE SPECIALIST

## 2018-03-07 PROCEDURE — 63600175 PHARM REV CODE 636 W HCPCS: Performed by: PHYSICIAN ASSISTANT

## 2018-03-07 PROCEDURE — 36569 INSJ PICC 5 YR+ W/O IMAGING: CPT

## 2018-03-07 PROCEDURE — 99900035 HC TECH TIME PER 15 MIN (STAT): Mod: NTX

## 2018-03-07 PROCEDURE — 83735 ASSAY OF MAGNESIUM: CPT

## 2018-03-07 PROCEDURE — 25000003 PHARM REV CODE 250: Performed by: INTERNAL MEDICINE

## 2018-03-07 PROCEDURE — 82803 BLOOD GASES ANY COMBINATION: CPT | Mod: NTX

## 2018-03-07 PROCEDURE — 80053 COMPREHEN METABOLIC PANEL: CPT | Mod: 91

## 2018-03-07 PROCEDURE — 76937 US GUIDE VASCULAR ACCESS: CPT

## 2018-03-07 PROCEDURE — 63600175 PHARM REV CODE 636 W HCPCS: Performed by: STUDENT IN AN ORGANIZED HEALTH CARE EDUCATION/TRAINING PROGRAM

## 2018-03-07 PROCEDURE — 27000202 HC IABP, ADD'L DAY: Mod: NTX

## 2018-03-07 RX ORDER — POTASSIUM CHLORIDE 20 MEQ/1
40 TABLET, EXTENDED RELEASE ORAL ONCE
Status: COMPLETED | OUTPATIENT
Start: 2018-03-07 | End: 2018-03-07

## 2018-03-07 RX ORDER — HEPARIN SODIUM 5000 [USP'U]/ML
5000 INJECTION, SOLUTION INTRAVENOUS; SUBCUTANEOUS EVERY 12 HOURS
Status: DISCONTINUED | OUTPATIENT
Start: 2018-03-07 | End: 2018-03-08

## 2018-03-07 RX ORDER — POTASSIUM CHLORIDE 20 MEQ/15ML
20 SOLUTION ORAL ONCE
Status: COMPLETED | OUTPATIENT
Start: 2018-03-07 | End: 2018-03-07

## 2018-03-07 RX ORDER — CEFEPIME HYDROCHLORIDE 1 G/1
1 INJECTION, POWDER, FOR SOLUTION INTRAMUSCULAR; INTRAVENOUS
Status: DISCONTINUED | OUTPATIENT
Start: 2018-03-08 | End: 2018-03-08

## 2018-03-07 RX ORDER — MUPIROCIN 20 MG/G
1 OINTMENT TOPICAL
Status: CANCELLED | OUTPATIENT
Start: 2018-03-07

## 2018-03-07 RX ORDER — RAMELTEON 8 MG/1
8 TABLET ORAL ONCE
Status: COMPLETED | OUTPATIENT
Start: 2018-03-07 | End: 2018-03-07

## 2018-03-07 RX ORDER — MUPIROCIN 20 MG/G
OINTMENT TOPICAL
Status: CANCELLED | OUTPATIENT
Start: 2018-03-07

## 2018-03-07 RX ORDER — PREDNISONE 20 MG/1
20 TABLET ORAL ONCE
Status: COMPLETED | OUTPATIENT
Start: 2018-03-07 | End: 2018-03-07

## 2018-03-07 RX ORDER — CYCLOBENZAPRINE HCL 5 MG
5 TABLET ORAL ONCE
Status: DISCONTINUED | OUTPATIENT
Start: 2018-03-07 | End: 2018-03-08

## 2018-03-07 RX ORDER — POTASSIUM CHLORIDE 20 MEQ/15ML
40 SOLUTION ORAL ONCE
Status: DISCONTINUED | OUTPATIENT
Start: 2018-03-07 | End: 2018-03-07

## 2018-03-07 RX ORDER — FLUCONAZOLE 2 MG/ML
400 INJECTION, SOLUTION INTRAVENOUS
Status: DISCONTINUED | OUTPATIENT
Start: 2018-03-08 | End: 2018-03-08

## 2018-03-07 RX ORDER — METOLAZONE 2.5 MG/1
5 TABLET ORAL ONCE
Status: COMPLETED | OUTPATIENT
Start: 2018-03-07 | End: 2018-03-07

## 2018-03-07 RX ORDER — FUROSEMIDE 10 MG/ML
80 INJECTION INTRAMUSCULAR; INTRAVENOUS ONCE
Status: COMPLETED | OUTPATIENT
Start: 2018-03-07 | End: 2018-03-07

## 2018-03-07 RX ORDER — POTASSIUM CHLORIDE 20 MEQ/15ML
20 SOLUTION ORAL ONCE
Status: DISCONTINUED | OUTPATIENT
Start: 2018-03-07 | End: 2018-03-07

## 2018-03-07 RX ADMIN — ASPIRIN 81 MG: 81 TABLET, COATED ORAL at 08:03

## 2018-03-07 RX ADMIN — PHYTONADIONE 10 MG: 10 INJECTION, EMULSION INTRAMUSCULAR; INTRAVENOUS; SUBCUTANEOUS at 05:03

## 2018-03-07 RX ADMIN — POTASSIUM CHLORIDE 20 MEQ: 20 SOLUTION ORAL at 06:03

## 2018-03-07 RX ADMIN — ALLOPURINOL 100 MG: 100 TABLET ORAL at 08:03

## 2018-03-07 RX ADMIN — DOBUTAMINE IN DEXTROSE 5 MCG/KG/MIN: 200 INJECTION, SOLUTION INTRAVENOUS at 08:03

## 2018-03-07 RX ADMIN — AMIODARONE HYDROCHLORIDE 200 MG: 200 TABLET ORAL at 08:03

## 2018-03-07 RX ADMIN — Medication 3 ML: at 05:03

## 2018-03-07 RX ADMIN — DOCUSATE SODIUM 100 MG: 100 CAPSULE, LIQUID FILLED ORAL at 08:03

## 2018-03-07 RX ADMIN — Medication 800 MG: at 08:03

## 2018-03-07 RX ADMIN — HEPARIN SODIUM 5000 UNITS: 5000 INJECTION, SOLUTION INTRAVENOUS; SUBCUTANEOUS at 09:03

## 2018-03-07 RX ADMIN — FUROSEMIDE 15 MG/HR: 10 INJECTION, SOLUTION INTRAVENOUS at 09:03

## 2018-03-07 RX ADMIN — HEPARIN SODIUM 5000 UNITS: 5000 INJECTION, SOLUTION INTRAVENOUS; SUBCUTANEOUS at 11:03

## 2018-03-07 RX ADMIN — Medication 3 ML: at 01:03

## 2018-03-07 RX ADMIN — POTASSIUM CHLORIDE 40 MEQ: 1500 TABLET, EXTENDED RELEASE ORAL at 06:03

## 2018-03-07 RX ADMIN — Medication 800 MG: at 09:03

## 2018-03-07 RX ADMIN — PREDNISONE 20 MG: 20 TABLET ORAL at 01:03

## 2018-03-07 RX ADMIN — TRAMADOL HYDROCHLORIDE 50 MG: 50 TABLET, FILM COATED ORAL at 01:03

## 2018-03-07 RX ADMIN — METOLAZONE 5 MG: 2.5 TABLET ORAL at 04:03

## 2018-03-07 RX ADMIN — TRAMADOL HYDROCHLORIDE 50 MG: 50 TABLET, FILM COATED ORAL at 08:03

## 2018-03-07 RX ADMIN — DOCUSATE SODIUM 100 MG: 100 CAPSULE, LIQUID FILLED ORAL at 09:03

## 2018-03-07 RX ADMIN — DOCUSATE SODIUM 100 MG: 100 CAPSULE, LIQUID FILLED ORAL at 02:03

## 2018-03-07 RX ADMIN — ALLOPURINOL 100 MG: 100 TABLET ORAL at 02:03

## 2018-03-07 RX ADMIN — ALLOPURINOL 100 MG: 100 TABLET ORAL at 09:03

## 2018-03-07 RX ADMIN — RAMELTEON 8 MG: 8 TABLET, FILM COATED ORAL at 09:03

## 2018-03-07 RX ADMIN — Medication 3 ML: at 09:03

## 2018-03-07 RX ADMIN — FUROSEMIDE 80 MG: 10 INJECTION, SOLUTION INTRAMUSCULAR; INTRAVENOUS at 07:03

## 2018-03-07 NOTE — SUBJECTIVE & OBJECTIVE
Past Medical History:   Diagnosis Date    CHF (congestive heart failure)     Dilated cardiomyopathy 1/10/2018    Disorder of kidney and ureter     CKD    Gout     HTN (hypertension)     Ventricular tachycardia (paroxysmal)        Past Surgical History:   Procedure Laterality Date    CARDIAC CATHETERIZATION  Dec. 2012    CARDIAC DEFIBRILLATOR PLACEMENT Left     CRRT-D       Review of patient's allergies indicates:   Allergen Reactions    Lisinopril Anaphylaxis       Medications:  Continuous Infusions:   DOBUTamine 5 mcg/kg/min (03/07/18 0811)    furosemide (LASIX) 5 mg/mL infusion (non-titrating) 15 mg/hr (03/07/18 0916)     Scheduled Meds:   allopurinol  100 mg Oral TID    amiodarone  200 mg Oral Daily    aspirin  81 mg Oral Daily    docusate sodium  100 mg Oral TID    heparin (porcine)  5,000 Units Subcutaneous Q12H    magnesium oxide  800 mg Oral BID    sodium chloride 0.9%  3 mL Intravenous Q8H     PRN Meds:traMADol    Family History     Problem Relation (Age of Onset)    Cancer Sister (54)    Coronary artery disease Father    Diabetes Father    Heart disease Father    Hypertension Father    No Known Problems Mother, Brother        Social History Main Topics    Smoking status: Former Smoker     Packs/day: 1.00     Years: 31.00     Types: Cigarettes     Quit date: 1/12/2018    Smokeless tobacco: Never Used      Comment: pt is quiting on his own - pt stated not qualified for program; 2/16 pt  quit on his own    Alcohol use No      Comment: one fifth of gin or rum/week    Drug use: No    Sexual activity: Yes     Partners: Female     Birth control/ protection: None      Comment: 10/5/17  with same partner 7 years        Review of Systems   Constitutional: Positive for activity change, appetite change and fatigue.   Neurological: Positive for weakness.     Objective:     Vital Signs (Most Recent):  Temp: 98 °F (36.7 °C) (03/07/18 0300)  Pulse: 96 (03/07/18 0800)  Resp: 17 (03/07/18  0800)  BP: 136/79 (03/07/18 0701)  SpO2: 99 % (03/07/18 0800) Vital Signs (24h Range):  Temp:  [98 °F (36.7 °C)-98.4 °F (36.9 °C)] 98 °F (36.7 °C)  Pulse:  [] 96  Resp:  [13-24] 17  SpO2:  [90 %-100 %] 99 %  BP: ()/(58-82) 136/79     Weight: 124 kg (273 lb 5.9 oz)  Body mass index is 36.07 kg/m².    Review of Symptoms  Symptom Assessment (ESAS 0-10 scale)   ESAS 0 1 2 3 4 5 6 7 8 9 10   Pain X             Dyspnea X             Anxiety X             Nausea X             Depression  X             Anorexia X             Fatigue        X      Insomnia X             Restlessness  x             Agitation X             CAM / Delirium __ --  ___+   Constipation     __ --  ___+   Diarrhea           __ --  ___+  Bowel Management Plan (BMP): Yes      Pain Assessment: No pain noted    OME in 24 hours: 0        ECOG Performance Status Grade: 2 - Ambulates, capable of self care only    Physical Exam   Constitutional: He is oriented to person, place, and time. He appears well-developed. He is cooperative.   Cardiovascular:   IABP IN place to R groin   Pulmonary/Chest: Effort normal.   Neurological: He is alert and oriented to person, place, and time.   Psychiatric: He has a normal mood and affect. His speech is normal and behavior is normal. Thought content normal.       Significant Labs: All pertinent labs within the past 24 hours have been reviewed.  CBC:     Recent Labs  Lab 03/07/18  0258   WBC 6.19   HGB 11.2*   HCT 35.8*   MCV 87        BMP:    Recent Labs  Lab 03/07/18  0258   GLU 95      K 3.8      CO2 24   BUN 16   CREATININE 1.6*   CALCIUM 9.7   MG 1.8     LFT:  Lab Results   Component Value Date    AST 27 03/07/2018    ALKPHOS 81 03/07/2018    BILITOT 1.4 (H) 03/07/2018     Albumin:   Albumin   Date Value Ref Range Status   03/07/2018 3.4 (L) 3.5 - 5.2 g/dL Final     Protein:   Total Protein   Date Value Ref Range Status   03/07/2018 7.0 6.0 - 8.4 g/dL Final     Lactic acid:   Lab Results    Component Value Date    LACTATE 1.9 07/31/2017       Significant Imaging: I have reviewed all pertinent imaging results/findings within the past 24 hours.    Advanced Directives::  Living Will: No  LaPOST: No  Do Not Resuscitate Status: No  Medical Power of : Wife next of kin.     Decision-Making Capacity: Patient answered questions, Family answered questions    Living Arrangements: Lives with spouse, Lives with family    Psychosocial/Cultural:  Pt works at [employer] in [employer].     Spiritual: yes    F- [place] and Belief: Orthodox    I - Importance: somewhat  .  C - Community:     A - Address in Care:  Will have  visit.

## 2018-03-07 NOTE — ASSESSMENT & PLAN NOTE
- cr 1.6 today  - Best course is to admit for accelerated evaluation and consideration for IABP to see if kidneys are reversible and discuss LVAD (tentative date 3/8)

## 2018-03-07 NOTE — PLAN OF CARE
See vital signs and assessments in flowsheets. See below for updates on today's progress.     Pulmonary: on room air, sats 91-92, no SOB or dyspnea reported    Cardiovascular: Paced Rhythm, no ectopies, Cuff SBP 90s-110s, IABP at Left groin, 1:1, Aug 85-100s, SBP 85s-90s, MAP>65, no chest pain reported, CVP 13-18     Neurological: AAOx4, Afebrile    Gastrointestinal: on cardiac diet, good appetite    Genitourinary: uses a urinal, good UOP    Integumentary: Intact    Infusions: DBS / KVO    Plan of care communicated and reviewed with Tim Richards and family. All concerns addressed. Will continue to monitor.

## 2018-03-07 NOTE — PROGRESS NOTES
Date Consent signed: [id number]    Sponsor: [employer]    Study Title/IRB Number: Prevent II 2016.270.A    Principle Investigator: Dr. Yg Kaufman    Present for Discussion: Yenny Alejandra, Tim Richards, Mrs. Richards     Is LAR Consenting for Subject: No    Prior to the Informed Consent (IC) being signed, or any study protocol required data collection, testing, procedure, or intervention being performed, the following was done and/or discussed:   Patient was given a copy of the IC for review    Purpose of the study and qualifications to participate    Study design, Follow up schedule, and tests or procedures done at each visit   Confidentiality and HIPAA Authorization for Release of Medical Records for the research trial/ subject's rights/research related injury   Risk, Benefits, Alternative Treatments, Compensation and Costs   Participation in the research trial is voluntary and patient may withdraw at anytime   Contact information for study related questions    Patient verbalizes understanding of the above: Yes  Contact information for CRC and PI given to patient: Yes  Patient able to adequately summarize: the purpose of the study, the risks associated with the study, and all procedures, testing, and follow-ups associated with the study: Yes    Mr. Richards signed the informed consent form for the Prevent II research study with an IRB approval date of 12/5/2017.  Each page of the consent form was reviewed with the patient (and pts family) and all questions answered satisfactorily. Mr. Richards signed the consent form and received a copy of same. The original consent was scanned into electronic medical records (EPIC) and filed into the subject's research study binder.

## 2018-03-07 NOTE — PROGRESS NOTES
[facility]  Heart Transplant  Progress Note    Patient Name: Tim Richards  MRN: [id number]  Admission Date: 3/1/2018  Hospital Length of Stay: 6 days  Attending Physician: Amparo Lopez MD  Primary Care Provider: Ja Méndez MD  Principal Problem:Acute decompensated heart failure    Subjective:     Interval History: No complaints today, approved for DT VAD. S/P IABP placement in L CFA yesterday.    Continuous Infusions:   DOBUTamine 5 mcg/kg/min (03/07/18 0811)    furosemide (LASIX) 5 mg/mL infusion (non-titrating) 15 mg/hr (03/07/18 0916)     Scheduled Meds:   allopurinol  100 mg Oral TID    amiodarone  200 mg Oral Daily    aspirin  81 mg Oral Daily    docusate sodium  100 mg Oral TID    heparin (porcine)  5,000 Units Subcutaneous Q12H    magnesium oxide  800 mg Oral BID    sodium chloride 0.9%  3 mL Intravenous Q8H     PRN Meds:traMADol    Review of patient's allergies indicates:   Allergen Reactions    Lisinopril Anaphylaxis     Objective:     Vital Signs (Most Recent):  Temp: 98 °F (36.7 °C) (03/07/18 0300)  Pulse: 96 (03/07/18 0800)  Resp: 17 (03/07/18 0800)  BP: 136/79 (03/07/18 0701)  SpO2: 99 % (03/07/18 0800) Vital Signs (24h Range):  Temp:  [98 °F (36.7 °C)-98.4 °F (36.9 °C)] 98 °F (36.7 °C)  Pulse:  [] 96  Resp:  [13-24] 17  SpO2:  [90 %-100 %] 99 %  BP: ()/(58-82) 136/79     Patient Vitals for the past 72 hrs (Last 3 readings):   Weight   03/06/18 1901 124 kg (273 lb 5.9 oz)   03/06/18 1630 124.1 kg (273 lb 9.5 oz)   03/06/18 0708 118.7 kg (261 lb 11 oz)     Body mass index is 36.07 kg/m².      Intake/Output Summary (Last 24 hours) at 03/07/18 1055  Last data filed at 03/07/18 0811   Gross per 24 hour   Intake           1523.3 ml   Output             1895 ml   Net           -371.7 ml     Hemodynamic Parameters:    Physical Exam   Constitutional: He appears well-developed and well-nourished. No distress.   HENT:   Head: Normocephalic and  atraumatic.   Eyes: Pupils are equal, round, and reactive to light.   Neck: Normal range of motion. JVD (mid neck) present.   Cardiovascular: Normal rate.  Exam reveals no friction rub.    No murmur heard.  Pulmonary/Chest: Effort normal. No respiratory distress. He has no wheezes.   Abdominal: Soft. He exhibits no distension.   Musculoskeletal: Normal range of motion. He exhibits no edema.   Neurological: He is alert.   Skin: Skin is warm. Capillary refill takes 2 to 3 seconds. He is not diaphoretic. No erythema.   L CFA site clean and dry, no hematoma noted.   Psychiatric: He has a normal mood and affect. His behavior is normal. Judgment and thought content normal.     Significant Labs:  CBC:    Recent Labs  Lab 03/05/18  0520 03/06/18  0507 03/07/18  0258   WBC 5.07 5.73 6.19   RBC 4.68 4.37* 4.10*   HGB 13.2* 11.9* 11.2*   HCT 40.0 37.2* 35.8*    196 182   MCV 86 85 87   MCH 28.2 27.2 27.3   MCHC 33.0 32.0 31.3*     BNP:    Recent Labs  Lab 03/01/18  0816 03/05/18  0820 03/07/18  0258   * 1,270* 1,110*     CMP:    Recent Labs  Lab 03/06/18  0507 03/06/18  1645 03/07/18  0258   GLU 82 87 95   CALCIUM 9.8 9.6 9.7   ALBUMIN 3.5 3.6 3.4*   PROT 6.9 7.0 7.0    136 136   K 3.6 3.9 3.8   CO2 26 28 24    101 101   BUN 19 18 16   CREATININE 1.7* 1.7* 1.6*   ALKPHOS 83 82 81   ALT 18 20 18   AST 25 23 27   BILITOT 1.0 1.2* 1.4*      Coagulation:     Recent Labs  Lab 03/01/18  1721   INR 1.1     LDH:  No results for input(s): LDH in the last 72 hours.  Microbiology:  Microbiology Results (last 7 days)     Procedure Component Value Units Date/Time    IV catheter culture [[id number]]     Order Status:  No result Specimen:  Catheter Tip from Catheter Tip, Intrajugular     Blood culture [[id number]] Collected:  03/06/18 1107    Order Status:  Completed Specimen:  Blood Updated:  03/06/18 2115     Blood Culture, Routine No Growth to date    Blood culture [[id number]] Collected:  03/06/18 1107    Order  Status:  Completed Specimen:  Blood Updated:  03/06/18 2115     Blood Culture, Routine No Growth to date    Blood culture [[id number]]     Order Status:  Canceled Specimen:  Blood     Blood culture [[id number]]     Order Status:  Canceled Specimen:  Blood           I have reviewed all pertinent labs within the past 24 hours.    Estimated Creatinine Clearance: 75.3 mL/min (A) (based on SCr of 1.6 mg/dL (H)).    Diagnostic Results:  I have reviewed and interpreted all pertinent imaging results/findings within the past 24 hours.    Assessment and Plan:     Mr. Richards is a 52 y.o. year old Black or  male who has presents as f/u from hospitalization for ADHF after presenting to clinic 1/10/17 as initial consult. advanced surgical options (LVAD/OHT).    This 53 yo BM has had CHF since 2011 at which time an angiogram did not demonstrate any CAD.  He is on amiodarone for VT. He was admitted from 1/12-1/17/18 (see d/c summary) where he was treated for ADHF and initiation of w/u for advanced options. RHC during that admission showed RA 17 and PCWP 35 as well as severely reduced CI 1.6. Though not optimized, he was discharged per his request so as not to lose his job/insurance--see Dr. Hadley's attestation for full details on d/c summary. Since d/c, his Scr has risen from 2.0 on discharge to 2.8 (1/23/18). His BNP had declined to 1040.  He presents today for scheduled hospital f/u.      Today, he reports feeling well. He says he has more energy than usual. His only complaints are cramping and xerosis. He denies n/v/d, no CP, palpitations or syncope. He has stable orthostatic dizziness/LH. No PND or orthopnea. His COLEMAN is improved from discharge and his weight is down about 5-7 pounds on his home scale. Of note, his Scr on labs from 2 days ago showed Scr 2.8 up from 2.0. T. Bili was down from 1.1 to 0.6 and BNP was down to 1040 from 1700.  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 No labs  initially ordered but I ordered and sent him down.     CKD (chronic kidney disease), stage III    - cr 1.6 today  - Best course is to admit for accelerated evaluation and consideration for IABP to see if kidneys are reversible and discuss LVAD (tentative date 3/8)        PVT (paroxysmal ventricular tachycardia)    - will hold beta blocker for now given hypotension, now on         Acute on chronic combined systolic and diastolic heart failure    - Cr 2.8 in clinic, down to 1.6 now s/p diuresis and initiation of inotropes and IABP  - D/C central line today and culture tip after midline is placed  - UA, Blood cultures x2 complete, not on any afterload reduction  - CVP elevated s/p golytely prep for c-scope, will bolus and restart IV Lasix ggt  - Approved for DT VAD today at selection, VAD date 3/8/18  - Will hold off on GDMT for now as VAD this admit  - IABP in place augmenting in 90's at 1:1, SvO2 53 this AM, CVP 13  - numerous BM's yesterday due to c-scope        Biventricular implantable cardioverter-defibrillator in situ    - in place  - ICD interrogated, event noted on 2/25/18 with appropriate shock for VT     Cardiogenic shock  - now s/p IABP on 3/6/18  - continue        Critical Care Time: 50 minutes     Critical care was time spent personally by me on the following activities: development of treatment plan with patient or surrogate and bedside caregivers, discussions with consultants, evaluation of patient's response to treatment, examination of patient, ordering and performing treatments and interventions, ordering and review of laboratory studies, ordering and review of radiographic studies, pulse oximetry, re-evaluation of patient's condition. This critical care time did not overlap with that of any other provider or involve time for any procedures.      Ivette Gustafson PA-C  Heart Transplant  [facility]

## 2018-03-07 NOTE — PHYSICIAN QUERY
PT Name: Tim Richards  MR #: [id number]    Physician Query Form -Present on Admission (POA) Diagnosis Clarification     CDS/: Ariana Jefferson RN, CCDS              Contact information: [email or web address]    This form is a permanent document in the medical record.     Query Date: March 7, 2018    By submitting this query, we are merely seeking further clarification of documentation. Please utilize your independent clinical judgment when addressing the question(s) below.       The Medical record contains the following:    Diagnosis      Supporting Clinical Information   Location in Medical Record     Cardiogenic Shock         Diuresing well with improved filling pressures and less volume on exam- s/p IABP yest with further improvement in Cr today (1.6)    Repeat SVO2 increased from 52 to 60 and CVP decreased from 16 to 7. On Dobutamine 5mcg/kg/min and lasix gtt 10mg/hr.     CVP 4-6.    3/7 prog notes          3/1 nurse note          3/2 nurse plan of care           Doctor, Please specify Present On Admission (POA) status of __Cardiogenic Shock_____.    [ x ] Present on Admission   [  ] Not Present on Admission  [  ] Clinically undetermined

## 2018-03-07 NOTE — PLAN OF CARE
Problem: Patient Care Overview  Goal: Plan of Care Review  Outcome: Ongoing (interventions implemented as appropriate)  Dobutamine and lasix gtts. CVP 13 and 15. Central line pulled and tip cultured. Midline and PIV placed. IABP 1:1, ECG, auto. Pt consented for procedure tomorrow. VS and assessment per flow sheet, patient progressing towards goals as tolerated. Plan is to prepare for LVAD procedure tomorrow. Plan of care reviewed with Tim Richards and family, all concerns addressed, will continue to monitor.      Problem: Fall Risk (Adult)  Intervention: Patient Rounds  Past Medical History:   Diagnosis Date    CHF (congestive heart failure)     Dilated cardiomyopathy 1/10/2018    Disorder of kidney and ureter     CKD    Gout     HTN (hypertension)     Ventricular tachycardia (paroxysmal)

## 2018-03-07 NOTE — COMMITTEE REVIEW
Native Organ Dx: Idiopathic CM     Denied     Pt's case presented to selection committee 3/7/18.   The committee decision was to deny the patient to transplant at this time due to elevated PA pressures and recent smoking.       He is approved for LVAD as DT.      Note was written by Magi Ybarra.    ==========================================================    I was present at the meeting and agree to the decision of the committee.

## 2018-03-07 NOTE — CONSULTS
[facility]  Palliative Medicine  Consult Note    Patient Name: Tim Richards  MRN: [id number]  Admission Date: 3/1/2018  Hospital Length of Stay: 6 days  Code Status: Full Code   Attending Provider: Amparo Lopez MD  Consulting Provider: LEATHA Hastings  Primary Care Physician: Ja Méndez MD  Principal Problem:Acute decompensated heart failure    Patient information was obtained from patient, spouse/SO and ER records.      Consults  Assessment/Plan:     Palliative care encounter    Discussion conducted with the pt who reported his goals for health care for getting an LVAD is to not feel so lethargic. Per pt, he misses being able to go on outings with wife and family. Per pt he tires so quickly and has is SOB with exertion. Pt wants to spend more time with his wife and and grandchildren.   The pts chief concern/fear reported pertaining to receiving an LVAD and the impact on his/her life was getting used to LVAD and care of LVAD. Pt reports he met an LVAD recipient yesterday and it made him feel better.     The need for preparedness planning was discussed with special attention and in reference to:  a) device failure b) suboptimal QOL post LVAD procedure, c) impact on co- morbid conditions, and d) catastrophic complications such as stroke, renal failure/hemodialysis or other chronic critical illness. In particular, the following points should be noted in the event of:  1) Device failure: Pt verbalized this is a possibility that can lead to death  2) a) Post LVAD QOL goals are to be more active in his life. Per pt, he gets very lethargic at work. Per pt, he has not been able to go on outings with family. Per pt, he is hopeful getting an LVAD will make him feel better.      b) Chronic poor QOL is defined as: being tired all the time and not being able to enjoy an active life.         3)   Catastrophic Complications:  Discussed with pt. Pt verbalized teaching from LVAD HTS  Nurse . Complications can include infection, stroke, gi bleed, MSOF, device failure.   Talked to pt about the importance of talking to his wife about his wishes if a catastophic compliction occurs and pt unable to make his own medical decisions.        During the discussion the pt/(caregiver) demonstrated __excellent _X__good   __marginal__poor insight/understanding concerning the risk vs benefits of obtaining an LVAD                Thank you for your consult. I will follow-up with patient. Please contact us if you have any additional questions.    Subjective:     HPI:   Pt is a 53 y/o Male with CHF stage D, S/p, CRT and  ICD secondary to VT and multiple interrogation   Per chart review, pt has hx of alcohol use and medical therapy noncompliance presenting from home after being seen in clinic 3 days ago for advanced option work-up  Pt has been following with Dr. Doe at  for CHF since 2011 when he was diagnosed with dialted cardiomyopathy and low EF of 25% after a cardiac cath that revealed non obstructive coronaries. He had an ICD placed on 10/31/2014 for episodic VT . Last  Echo on file was three months ago and his EF was 10% with moderate mitral valve insufficient. Pt reports a lot of fatigue at home and at work although he is mostly seated at workplace. His appetite is also decreased without weight loss, leg edema, constipation, PND or orthopnea. He also denies ICD misfiring, chest pain or palpitation.    Currently, pt has IABP in place. Pt scheduled for LVAD surgery tomorrow.     Hospital Course:  No notes on file        Past Medical History:   Diagnosis Date    CHF (congestive heart failure)     Dilated cardiomyopathy 1/10/2018    Disorder of kidney and ureter     CKD    Gout     HTN (hypertension)     Ventricular tachycardia (paroxysmal)        Past Surgical History:   Procedure Laterality Date    CARDIAC CATHETERIZATION  Dec. 2012    CARDIAC DEFIBRILLATOR PLACEMENT Left     CRRT-D       Review of  patient's allergies indicates:   Allergen Reactions    Lisinopril Anaphylaxis       Medications:  Continuous Infusions:   DOBUTamine 5 mcg/kg/min (03/07/18 0811)    furosemide (LASIX) 5 mg/mL infusion (non-titrating) 15 mg/hr (03/07/18 0916)     Scheduled Meds:   allopurinol  100 mg Oral TID    amiodarone  200 mg Oral Daily    aspirin  81 mg Oral Daily    docusate sodium  100 mg Oral TID    heparin (porcine)  5,000 Units Subcutaneous Q12H    magnesium oxide  800 mg Oral BID    sodium chloride 0.9%  3 mL Intravenous Q8H     PRN Meds:traMADol    Family History     Problem Relation (Age of Onset)    Cancer Sister (54)    Coronary artery disease Father    Diabetes Father    Heart disease Father    Hypertension Father    No Known Problems Mother, Brother        Social History Main Topics    Smoking status: Former Smoker     Packs/day: 1.00     Years: 31.00     Types: Cigarettes     Quit date: 1/12/2018    Smokeless tobacco: Never Used      Comment: pt is quiting on his own - pt stated not qualified for program; 2/16 pt  quit on his own    Alcohol use No      Comment: one fifth of gin or rum/week    Drug use: No    Sexual activity: Yes     Partners: Female     Birth control/ protection: None      Comment: 10/5/17  with same partner 7 years        Review of Systems   Constitutional: Positive for activity change, appetite change and fatigue.   Neurological: Positive for weakness.     Objective:     Vital Signs (Most Recent):  Temp: 98 °F (36.7 °C) (03/07/18 0300)  Pulse: 96 (03/07/18 0800)  Resp: 17 (03/07/18 0800)  BP: 136/79 (03/07/18 0701)  SpO2: 99 % (03/07/18 0800) Vital Signs (24h Range):  Temp:  [98 °F (36.7 °C)-98.4 °F (36.9 °C)] 98 °F (36.7 °C)  Pulse:  [] 96  Resp:  [13-24] 17  SpO2:  [90 %-100 %] 99 %  BP: ()/(58-82) 136/79     Weight: 124 kg (273 lb 5.9 oz)  Body mass index is 36.07 kg/m².    Review of Symptoms  Symptom Assessment (ESAS 0-10 scale)   ESAS 0 1 2 3 4 5 6 7 8 9 10    Pain X             Dyspnea X             Anxiety X             Nausea X             Depression  X             Anorexia X             Fatigue        X      Insomnia X             Restlessness  x             Agitation X             CAM / Delirium __ --  ___+   Constipation     __ --  ___+   Diarrhea           __ --  ___+  Bowel Management Plan (BMP): Yes      Pain Assessment: No pain noted    OME in 24 hours: 0        ECOG Performance Status Grade: 2 - Ambulates, capable of self care only    Physical Exam   Constitutional: He is oriented to person, place, and time. He appears well-developed. He is cooperative.   Cardiovascular:   IABP IN place to R groin   Pulmonary/Chest: Effort normal.   Neurological: He is alert and oriented to person, place, and time.   Psychiatric: He has a normal mood and affect. His speech is normal and behavior is normal. Thought content normal.       Significant Labs: All pertinent labs within the past 24 hours have been reviewed.  CBC:     Recent Labs  Lab 03/07/18  0258   WBC 6.19   HGB 11.2*   HCT 35.8*   MCV 87        BMP:    Recent Labs  Lab 03/07/18  0258   GLU 95      K 3.8      CO2 24   BUN 16   CREATININE 1.6*   CALCIUM 9.7   MG 1.8     LFT:  Lab Results   Component Value Date    AST 27 03/07/2018    ALKPHOS 81 03/07/2018    BILITOT 1.4 (H) 03/07/2018     Albumin:   Albumin   Date Value Ref Range Status   03/07/2018 3.4 (L) 3.5 - 5.2 g/dL Final     Protein:   Total Protein   Date Value Ref Range Status   03/07/2018 7.0 6.0 - 8.4 g/dL Final     Lactic acid:   Lab Results   Component Value Date    LACTATE 1.9 07/31/2017       Significant Imaging: I have reviewed all pertinent imaging results/findings within the past 24 hours.    Advanced Directives::  Living Will: No  LaPOST: No  Do Not Resuscitate Status: No  Medical Power of : Wife next of kin.     Decision-Making Capacity: Patient answered questions, Family answered questions    Living Arrangements:  Lives with spouse, Lives with family    Psychosocial/Cultural:  Pt works at [employer] in [place] [employer].     Spiritual: yes    F- Temitope and Belief: Hindu    I - Importance: somewhat  .  C - Community:     A - Address in Care:  Will have  visit.       > 50% of 55 min visit spent in chart review, face to face discussion of goals of care,  symptom assessment, coordination of care and emotional support.    Savanna Dowd, CNS  Palliative Medicine  [facility]

## 2018-03-07 NOTE — ASSESSMENT & PLAN NOTE
Discussion conducted with the pt who reported his goals for health care for getting an LVAD is to not feel so lethargic. Per pt, he misses being able to go on outings with wife and family. Per pt he tires so quickly and has is SOB with exertion. Pt wants to spend more time with his wife and and grandchildren.   The pts chief concern/fear reported pertaining to receiving an LVAD and the impact on his/her life was getting used to LVAD and care of LVAD. Pt reports he met an LVAD recipient yesterday and it made him feel better.     The need for preparedness planning was discussed with special attention and in reference to:  a) device failure b) suboptimal QOL post LVAD procedure, c) impact on co- morbid conditions, and d) catastrophic complications such as stroke, renal failure/hemodialysis or other chronic critical illness. In particular, the following points should be noted in the event of:  1) Device failure: Pt verbalized this is a possibility that can lead to death  2) a) Post LVAD QOL goals are to be more active in his life. Per pt, he gets very lethargic at work. Per pt, he has not been able to go on outings with family. Per pt, he is hopeful getting an LVAD will make him feel better.      b) Chronic poor QOL is defined as: being tired all the time and not being able to enjoy an active life.         3)   Catastrophic Complications:  Discussed with pt. Pt verbalized teaching from LVAD HTS Nurse . Complications can include infection, stroke, gi bleed, MSOF, device failure.   Talked to pt about the importance of talking to his wife about his wishes if a catastophic compliction occurs and pt unable to make his own medical decisions.        During the discussion the pt/(caregiver) demonstrated __excellent _X__good   __marginal__poor insight/understanding concerning the risk vs benefits of obtaining an LVAD

## 2018-03-07 NOTE — SUBJECTIVE & OBJECTIVE
Interval History: No complaints today, approved for DT VAD. S/P IABP placement in L CFA yesterday.    Continuous Infusions:   DOBUTamine 5 mcg/kg/min (03/07/18 0811)    furosemide (LASIX) 5 mg/mL infusion (non-titrating) 15 mg/hr (03/07/18 0916)     Scheduled Meds:   allopurinol  100 mg Oral TID    amiodarone  200 mg Oral Daily    aspirin  81 mg Oral Daily    docusate sodium  100 mg Oral TID    heparin (porcine)  5,000 Units Subcutaneous Q12H    magnesium oxide  800 mg Oral BID    sodium chloride 0.9%  3 mL Intravenous Q8H     PRN Meds:traMADol    Review of patient's allergies indicates:   Allergen Reactions    Lisinopril Anaphylaxis     Objective:     Vital Signs (Most Recent):  Temp: 98 °F (36.7 °C) (03/07/18 0300)  Pulse: 96 (03/07/18 0800)  Resp: 17 (03/07/18 0800)  BP: 136/79 (03/07/18 0701)  SpO2: 99 % (03/07/18 0800) Vital Signs (24h Range):  Temp:  [98 °F (36.7 °C)-98.4 °F (36.9 °C)] 98 °F (36.7 °C)  Pulse:  [] 96  Resp:  [13-24] 17  SpO2:  [90 %-100 %] 99 %  BP: ()/(58-82) 136/79     Patient Vitals for the past 72 hrs (Last 3 readings):   Weight   03/06/18 1901 124 kg (273 lb 5.9 oz)   03/06/18 1630 124.1 kg (273 lb 9.5 oz)   03/06/18 0708 118.7 kg (261 lb 11 oz)     Body mass index is 36.07 kg/m².      Intake/Output Summary (Last 24 hours) at 03/07/18 1055  Last data filed at 03/07/18 0811   Gross per 24 hour   Intake           1523.3 ml   Output             1895 ml   Net           -371.7 ml     Hemodynamic Parameters:    Physical Exam   Constitutional: He appears well-developed and well-nourished. No distress.   HENT:   Head: Normocephalic and atraumatic.   Eyes: Pupils are equal, round, and reactive to light.   Neck: Normal range of motion. JVD (mid neck) present.   Cardiovascular: Normal rate.  Exam reveals no friction rub.    No murmur heard.  Pulmonary/Chest: Effort normal. No respiratory distress. He has no wheezes.   Abdominal: Soft. He exhibits no distension.    Musculoskeletal: Normal range of motion. He exhibits no edema.   Neurological: He is alert.   Skin: Skin is warm. Capillary refill takes 2 to 3 seconds. He is not diaphoretic. No erythema.   L CFA site clean and dry, no hematoma noted.   Psychiatric: He has a normal mood and affect. His behavior is normal. Judgment and thought content normal.     Significant Labs:  CBC:    Recent Labs  Lab 03/05/18  0520 03/06/18  0507 03/07/18  0258   WBC 5.07 5.73 6.19   RBC 4.68 4.37* 4.10*   HGB 13.2* 11.9* 11.2*   HCT 40.0 37.2* 35.8*    196 182   MCV 86 85 87   MCH 28.2 27.2 27.3   MCHC 33.0 32.0 31.3*     BNP:    Recent Labs  Lab 03/01/18  0816 03/05/18  0820 03/07/18  0258   * 1,270* 1,110*     CMP:    Recent Labs  Lab 03/06/18  0507 03/06/18  1645 03/07/18  0258   GLU 82 87 95   CALCIUM 9.8 9.6 9.7   ALBUMIN 3.5 3.6 3.4*   PROT 6.9 7.0 7.0    136 136   K 3.6 3.9 3.8   CO2 26 28 24    101 101   BUN 19 18 16   CREATININE 1.7* 1.7* 1.6*   ALKPHOS 83 82 81   ALT 18 20 18   AST 25 23 27   BILITOT 1.0 1.2* 1.4*      Coagulation:     Recent Labs  Lab 03/01/18  1721   INR 1.1     LDH:  No results for input(s): LDH in the last 72 hours.  Microbiology:  Microbiology Results (last 7 days)     Procedure Component Value Units Date/Time    IV catheter culture [[id number]]     Order Status:  No result Specimen:  Catheter Tip from Catheter Tip, Intrajugular     Blood culture [[id number]] Collected:  03/06/18 1107    Order Status:  Completed Specimen:  Blood Updated:  03/06/18 2115     Blood Culture, Routine No Growth to date    Blood culture [[id number]] Collected:  03/06/18 1107    Order Status:  Completed Specimen:  Blood Updated:  03/06/18 2115     Blood Culture, Routine No Growth to date    Blood culture [[id number]]     Order Status:  Canceled Specimen:  Blood     Blood culture [[id number]]     Order Status:  Canceled Specimen:  Blood           I have reviewed all pertinent labs within the past 24  hours.    Estimated Creatinine Clearance: 75.3 mL/min (A) (based on SCr of 1.6 mg/dL (H)).    Diagnostic Results:  I have reviewed and interpreted all pertinent imaging results/findings within the past 24 hours.

## 2018-03-07 NOTE — NURSING
Met with pt and wife at bedside to discuss Selection Committee decision to decline OHT at this time due to recent smoking and elevated PA pressures.  Pt is aware that he was approved for LVAD scheduled for tomorrow AM.     Informed pt that once he has recovered from LVAD and remains tobacco and alcohol free for 6mths, he can be re-considered for transplant.  Pt voiced understanding.

## 2018-03-07 NOTE — HPI
Pt is a 53 y/o Male with CHF stage D, S/p, CRT and  ICD secondary to VT and multiple interrogation  Per chart review, pt has hx of alcohol use and medical therapy noncompliance presenting from home after being seen in clinic 3 days ago for advanced option work-up  Pt has been following with Dr. Doe at  for CHF since 2011 when he was diagnosed with dialted cardiomyopathy and low EF of 25% after a cardiac cath that revealed non obstructive coronaries. He had an ICD placed on 10/31/2014 for episodic VT . Last  Echo on file was three months ago and his EF was 10% with moderate mitral valve insufficient. Pt reports a lot of fatigue at home and at work although he is mostly seated at workplace. His appetite is also decreased without weight loss, leg edema, constipation, PND or orthopnea. He also denies ICD misfiring, chest pain or palpitation.    Currently, pt has IABP in place. Pt scheduled for LVAD surgery tomorrow.

## 2018-03-07 NOTE — ASSESSMENT & PLAN NOTE
- Cr 2.8 in clinic, down to 1.6 now s/p diuresis and initiation of inotropes and IABP  - D/C central line today and culture tip after midline is placed  - UA, Blood cultures x2 complete, not on any afterload reduction  - CVP elevated s/p golytely prep for c-scope, will bolus and restart IV Lasix ggt  - Approved for DT VAD today at [facility], VAD date 3/8/18  - Will hold off on GDMT for now as VAD this admit  - IABP in place augmenting in 90's at 1:1, SvO2 53 this AM, CVP 13  - numerous BM's yesterday due to c-scope

## 2018-03-07 NOTE — PROGRESS NOTES
"Update:     ELIAN intern and supervisor CRISTIANE Abraham to pt's room. Pt presents aaxo4 with pleasant affect. Pt's wife with patient in room. Pt states feeling "good" about getting an LVAD this week. Pt's wife will stay in [facility] night of surgery. Pt states understanding of team's plan. Pt reports coping well and denies further needs, questions, concerns at this time and none indicated. Providing ongoing psychosocial and counseling support, education, resources, assistance and discharge planning as indicated. Following and available.      "

## 2018-03-07 NOTE — PROGRESS NOTES
Met with patient and wife today. Showed the HVAD components as well as the HMII components. Will have Yenny with [employer] go speak with patient regarding the Prevent II trial. Patient seems more interested in the HM II then the HVAD. Will continue to wait for final decision from patient. All VAD questions answered with verbalization of understanding. All VAD education complete.

## 2018-03-07 NOTE — CONSULTS
"Single lumen 18G x 8 cm midline placed right cephalic vein. Max dwell date 4/5/18, Lot#[id number] . 18G 1-3/4" placed to RFA.  Needle advanced into the vessel under real time ultrasound guidance.  Image recorded and saved.  "

## 2018-03-07 NOTE — ANESTHESIA PREPROCEDURE EVALUATION
[facility]  Anesthesia Pre-Operative Evaluation         Patient Name: Tim Richards  YOB: 1965  MRN: [id number]    SUBJECTIVE:     Pre-operative evaluation for Procedure(s) (LRB):  Insertion-Left Ventricular Assist Device (N/A)     03/07/2018    Tim Richards is a 52 y.o. male w/ a significant PMHx of difficult intubation (see prev airway documentation), CHF (EF 10%, PASP of 47), MI, HTN, CKD, s/p CRT and ICD for VT, and EtOH use. He was admitted from clinic on 3/1 with decompensated heart failure with plan for accelerated workup for LVAD/ heart transplant.   He had IABP placed on 3/6/18.    Patient now presents for the above procedure(s).    Patient was also consented for sternal wound closure and RVAD.      LDA: Rt IJ TLC      Prev airway: Placement Date: 10/31/14; Placement Time: 1205; Method of Intubation: Direct laryngoscopy, Glidescope; Inserted by: Anesthesia MD; Airway Device: Endotracheal Tube; Mask Ventilation: Mod Diff - oral; Intubated: Postinduction; Blade: Barrios #2; Airway Device Size: 7.5; Style: Cuffed; Cuff Inflation: Minimal occlusive pressure; Inflation Amount: 5.5; Placement Verified By: Auscultation, Capnometry, Fiberoptic bronchoscopic inspection; Grade: Grade IV; Complicating Factors: Large/Floppy epiglottis, Anterior larynx;    Drips:    DOBUTamine 5 mcg/kg/min (03/07/18 0701)    furosemide (LASIX) 5 mg/mL infusion (non-titrating)         Patient Active Problem List   Diagnosis    Chronic systolic heart failure    Cigarette nicotine dependence without complication    Alcohol abuse    Pulmonary nodule seen on imaging study    Biventricular implantable cardioverter-defibrillator in situ    Respiratory distress    Acute on chronic combined systolic and diastolic heart failure    Fluid overload    Syncope    Elevated troponin    PVT (paroxysmal ventricular tachycardia)    High risk medications (amiodarone) long-term use    Dilated  cardiomyopathy    Acute decompensated heart failure    CKD (chronic kidney disease), stage III    Hypertensive cardiovascular-renal disease, stage 1-4 or unspecified chronic kidney disease, with heart failure    Organ transplant candidate    Screening for colon cancer       Review of patient's allergies indicates:   Allergen Reactions    Lisinopril Anaphylaxis       Current Inpatient Medications:   allopurinol  100 mg Oral TID    amiodarone  200 mg Oral Daily    aspirin  81 mg Oral Daily    docusate sodium  100 mg Oral TID    furosemide  80 mg Intravenous Once    magnesium oxide  800 mg Oral BID    sodium chloride 0.9%  3 mL Intravenous Q8H       No current facility-administered medications on file prior to encounter.      Current Outpatient Prescriptions on File Prior to Encounter   Medication Sig Dispense Refill    allopurinol (ZYLOPRIM) 100 MG tablet TAKE 1 TABLET (100 MG TOTAL) BY MOUTH 3 (THREE) TIMES DAILY. 90 tablet 1    amiodarone (PACERONE) 200 MG Tab TAKE 1 TABLET (200 MG TOTAL) BY MOUTH ONCE DAILY. 30 tablet 7    aspirin (ECOTRIN) 81 MG EC tablet Take 81 mg by mouth. 1 Tablet, Delayed Release (E.C.) Oral Every day      bumetanide (BUMEX) 2 MG tablet Take 1 tablet (2 mg total) by mouth 2 (two) times daily. 120 tablet 2    losartan (COZAAR) 25 MG tablet Take 25 mg by mouth once daily.  1    potassium chloride SA (K-DUR,KLOR-CON) 10 MEQ tablet Take 2 tablets (20 mEq total) by mouth 2 (two) times daily. 240 tablet 2    spironolactone (ALDACTONE) 25 MG tablet Take 1 tablet (25 mg total) by mouth once daily. 30 tablet 0    ERGOCALCIFEROL, VITAMIN D2, (VITAMIN D ORAL) Take by mouth once daily.      metOLazone (ZAROXOLYN) 2.5 MG tablet Take 2.5 mg by mouth every other day.  3    metoprolol succinate (TOPROL-XL) 25 MG 24 hr tablet Take 0.5 tablets (12.5 mg total) by mouth once daily. 30 tablet 2    valsartan (DIOVAN) 80 MG tablet Take 0.5 tablets (40 mg total) by mouth 2 (two) times daily.  30 tablet 1    VITAMIN E ACETATE (VITAMIN E ORAL) Take by mouth once daily.         Past Surgical History:   Procedure Laterality Date    CARDIAC CATHETERIZATION  Dec. 2012    CARDIAC DEFIBRILLATOR PLACEMENT Left     CRRT-D       Social History     Social History    Marital status:      Spouse name: N/A    Number of children: N/A    Years of education: N/A     Occupational History     [employer]      Social History Main Topics    Smoking status: Former Smoker     Packs/day: 1.00     Years: 31.00     Types: Cigarettes     Quit date: 1/12/2018    Smokeless tobacco: Never Used      Comment: pt is quiting on his own - pt stated not qualified for program; 2/16 pt  quit on his own    Alcohol use No      Comment: one fifth of gin or rum/week    Drug use: No    Sexual activity: Yes     Partners: Female     Birth control/ protection: None      Comment: 10/5/17  with same partner 7 years      Other Topics Concern    Not on file     Social History Narrative    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       OBJECTIVE:     Vital Signs Range (Last 24H):  Temp:  [36.7 °C (98 °F)-36.9 °C (98.4 °F)]   Pulse:  []   Resp:  [13-24]   BP: ()/(58-82)   SpO2:  [90 %-100 %]       CBC:   Recent Labs      03/06/18   0507  03/07/18   0258   WBC  5.73  6.19   RBC  4.37*  4.10*   HGB  11.9*  11.2*   HCT  37.2*  35.8*   PLT  196  182   MCV  85  87   MCH  27.2  27.3   MCHC  32.0  31.3*       CMP:   Recent Labs      03/06/18   0507  03/06/18   1645  03/07/18   0258   NA  140  136  136   K  3.6  3.9  3.8   CL  100  101  101   CO2  26  28  24   BUN  19  18  16   CREATININE  1.7*  1.7*  1.6*   GLU  82  87  95   MG  1.7   --   1.8   CALCIUM  9.8  9.6  9.7   ALBUMIN  3.5  3.6  3.4*   PROT  6.9  7.0  7.0   ALKPHOS  83  82  81   ALT  18  20  18   AST  25  23  27   BILITOT  1.0  1.2*  1.4*       INR:  No results for input(s): PT, INR, PROTIME, APTT in the last 72 hours.    Diagnostic Studies: No relevant  studies.    EKG: No recent studies available.    2D ECHO:  Results for orders placed or performed during the hospital encounter of 03/01/18   2D echo with color flow doppler   Result Value Ref Range    EF 8 (A) 55 - 65    Mitral Valve Regurgitation MODERATE TO SEVERE (A)     Est. PA Systolic Pressure 38.28     Tricuspid Valve Regurgitation MILD          ASSESSMENT/PLAN:         Anesthesia Evaluation    I have reviewed the Patient Summary Reports.    I have reviewed the Nursing Notes.   I have reviewed the Medications.     Review of Systems  Anesthesia Hx:  Hx of Anesthetic complications  History of prior surgery of interest to airway management or planning: Previous anesthesia: General Airway issues documented on chart review include mask, difficult, difficult direct laryngoscopy, required fiberoptic intubation  Denies Family Hx of Anesthesia complications.  Personal Hx of Anesthesia complications  Difficult Intubation   Social:  Smoker    Hematology/Oncology:        Denies Current/Recent Cancer   EENT/Dental:   denies chronic allergic rhinitis   Cardiovascular:   Exercise tolerance: poor Pacemaker Hypertension Valvular problems/Murmurs (mild MR), MR Past MI Denies CAD.    Denies CABG/stent.  Denies Dysrhythmias.  CHF ECG has been reviewed.    Pulmonary:   Denies Pneumonia Denies Asthma.  Denies Shortness of breath.  Denies Recent URI. Pulmonary nodule   Renal/:   Chronic Renal Disease (creatinine=1.3 (baseline)), CRI    Hepatic/GI:  Hepatic/GI Normal Denies PUD.  Denies Hiatal Hernia.  Denies GERD.    Neurological:  Neurology Normal Denies TIA.  Denies CVA. Denies Seizures.    Endocrine:  Endocrine Normal Denies Diabetes.    Psych:   Denies Psychiatric History.          Physical Exam  General:  Well nourished    Airway/Jaw/Neck:  Airway Findings: Mouth Opening: Normal Tongue: Normal  General Airway Assessment: Adult  Mallampati: II  Jaw/Neck Findings:  Neck ROM: Normal ROM  Neck Findings: Normal M2-3, good airway  opening, no loose dentition per pt, FROM, short thick neck    Dental:  Dental Findings: In tact, Periodontal disease, Mild   Chest/Lungs:  Chest/Lungs Findings: Clear to auscultation, Normal Respiratory Rate     Heart/Vascular:  Heart Findings: Rate: Normal  Rhythm: Regular Rhythm  Sounds: Normal  Vascular Findings: Normal    Abdomen:  Abdomen Findings: Normal    Musculoskeletal:  Musculoskeletal Findings: Normal   Skin:  Skin Findings: Normal    Mental Status:  Mental Status Findings:  Cooperative, Alert and Oriented         Anesthesia Plan  Type of Anesthesia, risks & benefits discussed:  Anesthesia Type:  general  Patient's Preference:   Intra-op Monitoring Plan: standard ASA monitors, arterial line and central line  Intra-op Monitoring Plan Comments:   Post Op Pain Control Plan: per primary service following discharge from PACU  Post Op Pain Control Plan Comments:   Induction:   IV  Beta Blocker:  Patient is not currently on a Beta-Blocker (No further documentation required).       Informed Consent: Patient understands risks and agrees with Anesthesia plan.  Questions answered. Anesthesia consent signed with patient.  ASA Score: 4     Day of Surgery Review of History & Physical:    H&P update referred to the surgeon.         Ready For Surgery From Anesthesia Perspective.

## 2018-03-08 ENCOUNTER — ANESTHESIA (OUTPATIENT)
Dept: SURGERY | Facility: HOSPITAL | Age: 53
DRG: 001 | End: 2018-03-08
Payer: COMMERCIAL

## 2018-03-08 ENCOUNTER — ANESTHESIA EVENT (OUTPATIENT)
Dept: SURGERY | Facility: HOSPITAL | Age: 53
DRG: 001 | End: 2018-03-08
Payer: COMMERCIAL

## 2018-03-08 DIAGNOSIS — I50.9 HEART FAILURE, UNSPECIFIED HEART FAILURE CHRONICITY, UNSPECIFIED HEART FAILURE TYPE: Primary | ICD-10-CM

## 2018-03-08 PROBLEM — Z95.811 LVAD (LEFT VENTRICULAR ASSIST DEVICE) PRESENT: Status: ACTIVE | Noted: 2018-03-08

## 2018-03-08 PROBLEM — R73.9 HYPERGLYCEMIA: Status: ACTIVE | Noted: 2018-03-08

## 2018-03-08 LAB
ALBUMIN SERPL BCP-MCNC: 3.6 G/DL
ALLENS TEST: ABNORMAL
ALP SERPL-CCNC: 87 U/L
ALT SERPL W/O P-5'-P-CCNC: 27 U/L
ANION GAP SERPL CALC-SCNC: 12 MMOL/L
ANION GAP SERPL CALC-SCNC: 13 MMOL/L
ANION GAP SERPL CALC-SCNC: 14 MMOL/L
ANION GAP SERPL CALC-SCNC: 14 MMOL/L
APTT BLDCRRT: 23.3 SEC
APTT BLDCRRT: 25.7 SEC
APTT BLDCRRT: 29 SEC
AST SERPL-CCNC: 33 U/L
B2 GLYCOPROT1 IGA SER QL: <9 SAU
B2 GLYCOPROT1 IGG SER QL: <9 SGU
B2 GLYCOPROT1 IGM SER QL: <9 SMU
BASOPHILS # BLD AUTO: 0.01 K/UL
BASOPHILS # BLD AUTO: 0.02 K/UL
BASOPHILS # BLD AUTO: 0.02 K/UL
BASOPHILS # BLD AUTO: 0.04 K/UL
BASOPHILS NFR BLD: 0.1 %
BASOPHILS NFR BLD: 0.1 %
BASOPHILS NFR BLD: 0.2 %
BASOPHILS NFR BLD: 0.2 %
BILIRUB SERPL-MCNC: 1.2 MG/DL
BUN SERPL-MCNC: 18 MG/DL
BUN SERPL-MCNC: 21 MG/DL
BUN SERPL-MCNC: 23 MG/DL
BUN SERPL-MCNC: 23 MG/DL
CALCIUM SERPL-MCNC: 10 MG/DL
CALCIUM SERPL-MCNC: 9 MG/DL
CALCIUM SERPL-MCNC: 9.3 MG/DL
CALCIUM SERPL-MCNC: 9.4 MG/DL
CHLORIDE SERPL-SCNC: 92 MMOL/L
CHLORIDE SERPL-SCNC: 98 MMOL/L
CO2 SERPL-SCNC: 22 MMOL/L
CO2 SERPL-SCNC: 23 MMOL/L
CO2 SERPL-SCNC: 25 MMOL/L
CO2 SERPL-SCNC: 29 MMOL/L
CREAT SERPL-MCNC: 1.8 MG/DL
CREAT SERPL-MCNC: 1.9 MG/DL
CREAT SERPL-MCNC: 2.4 MG/DL
CREAT SERPL-MCNC: 2.4 MG/DL
DELSYS: ABNORMAL
DIFFERENTIAL METHOD: ABNORMAL
EOSINOPHIL # BLD AUTO: 0 K/UL
EOSINOPHIL # BLD AUTO: 0.1 K/UL
EOSINOPHIL NFR BLD: 0 %
EOSINOPHIL NFR BLD: 0 %
EOSINOPHIL NFR BLD: 0.2 %
EOSINOPHIL NFR BLD: 0.2 %
ERYTHROCYTE [DISTWIDTH] IN BLOOD BY AUTOMATED COUNT: 15.1 %
ERYTHROCYTE [DISTWIDTH] IN BLOOD BY AUTOMATED COUNT: 15.1 %
ERYTHROCYTE [DISTWIDTH] IN BLOOD BY AUTOMATED COUNT: 15.2 %
ERYTHROCYTE [DISTWIDTH] IN BLOOD BY AUTOMATED COUNT: 15.2 %
ERYTHROCYTE [SEDIMENTATION RATE] IN BLOOD BY WESTERGREN METHOD: 16 MM/H
ERYTHROCYTE [SEDIMENTATION RATE] IN BLOOD BY WESTERGREN METHOD: 20 MM/H
EST. GFR  (AFRICAN AMERICAN): 34.8 ML/MIN/1.73 M^2
EST. GFR  (AFRICAN AMERICAN): 34.8 ML/MIN/1.73 M^2
EST. GFR  (AFRICAN AMERICAN): 46.2 ML/MIN/1.73 M^2
EST. GFR  (AFRICAN AMERICAN): 49.3 ML/MIN/1.73 M^2
EST. GFR  (NON AFRICAN AMERICAN): 30.1 ML/MIN/1.73 M^2
EST. GFR  (NON AFRICAN AMERICAN): 30.1 ML/MIN/1.73 M^2
EST. GFR  (NON AFRICAN AMERICAN): 39.9 ML/MIN/1.73 M^2
EST. GFR  (NON AFRICAN AMERICAN): 42.6 ML/MIN/1.73 M^2
ESTIMATED AVG GLUCOSE: 111 MG/DL
FIO2: 0.8
FIO2: 0.9
FIO2: 1
FIO2: 1
FIO2: 100
FIO2: 100
FIO2: 50
FIO2: 80
FLOW: 60
GLUCOSE SERPL-MCNC: 143 MG/DL
GLUCOSE SERPL-MCNC: 156 MG/DL (ref 70–110)
GLUCOSE SERPL-MCNC: 169 MG/DL (ref 70–110)
GLUCOSE SERPL-MCNC: 184 MG/DL (ref 70–110)
GLUCOSE SERPL-MCNC: 189 MG/DL (ref 70–110)
GLUCOSE SERPL-MCNC: 190 MG/DL
GLUCOSE SERPL-MCNC: 192 MG/DL (ref 70–110)
GLUCOSE SERPL-MCNC: 211 MG/DL
GLUCOSE SERPL-MCNC: 214 MG/DL
HBA1C MFR BLD HPLC: 5.5 %
HCO3 UR-SCNC: 26.9 MMOL/L (ref 24–28)
HCO3 UR-SCNC: 27 MMOL/L (ref 24–28)
HCO3 UR-SCNC: 27 MMOL/L (ref 24–28)
HCO3 UR-SCNC: 27.1 MMOL/L (ref 24–28)
HCO3 UR-SCNC: 27.7 MMOL/L (ref 24–28)
HCO3 UR-SCNC: 28 MMOL/L (ref 24–28)
HCO3 UR-SCNC: 28.1 MMOL/L (ref 24–28)
HCO3 UR-SCNC: 28.2 MMOL/L (ref 24–28)
HCO3 UR-SCNC: 29.3 MMOL/L (ref 24–28)
HCO3 UR-SCNC: 29.7 MMOL/L (ref 24–28)
HCO3 UR-SCNC: 30.2 MMOL/L (ref 24–28)
HCO3 UR-SCNC: 30.5 MMOL/L (ref 24–28)
HCO3 UR-SCNC: 30.6 MMOL/L (ref 24–28)
HCO3 UR-SCNC: 31.5 MMOL/L (ref 24–28)
HCO3 UR-SCNC: 32.5 MMOL/L (ref 24–28)
HCO3 UR-SCNC: 32.9 MMOL/L (ref 24–28)
HCO3 UR-SCNC: 33.1 MMOL/L (ref 24–28)
HCO3 UR-SCNC: 34.3 MMOL/L (ref 24–28)
HCO3 UR-SCNC: 34.6 MMOL/L (ref 24–28)
HCT VFR BLD AUTO: 31.1 %
HCT VFR BLD AUTO: 33.2 %
HCT VFR BLD AUTO: 33.7 %
HCT VFR BLD AUTO: 35.9 %
HCT VFR BLD CALC: 33 %PCV (ref 36–54)
HCT VFR BLD CALC: 33 %PCV (ref 36–54)
HCT VFR BLD CALC: 34 %PCV (ref 36–54)
HCT VFR BLD CALC: 35 %PCV (ref 36–54)
HCT VFR BLD CALC: 37 %PCV (ref 36–54)
HGB BLD-MCNC: 10.3 G/DL
HGB BLD-MCNC: 10.6 G/DL
HGB BLD-MCNC: 11 G/DL
HGB BLD-MCNC: 11.4 G/DL
IMM GRANULOCYTES # BLD AUTO: 0.01 K/UL
IMM GRANULOCYTES # BLD AUTO: 0.08 K/UL
IMM GRANULOCYTES # BLD AUTO: 0.16 K/UL
IMM GRANULOCYTES # BLD AUTO: 0.23 K/UL
IMM GRANULOCYTES NFR BLD AUTO: 0.2 %
IMM GRANULOCYTES NFR BLD AUTO: 0.5 %
IMM GRANULOCYTES NFR BLD AUTO: 0.8 %
IMM GRANULOCYTES NFR BLD AUTO: 0.9 %
INR PPP: 1
INR PPP: 1.1
INR PPP: 1.1
INR PPP: 1.2
LDH SERPL L TO P-CCNC: 228 U/L
LDH SERPL L TO P-CCNC: 3.12 MMOL/L (ref 0.36–1.25)
LDH SERPL L TO P-CCNC: 3.7 MMOL/L (ref 0.36–1.25)
LDH SERPL L TO P-CCNC: 4.18 MMOL/L (ref 0.36–1.25)
LDH SERPL L TO P-CCNC: 4.34 MMOL/L (ref 0.36–1.25)
LDH SERPL L TO P-CCNC: 4.57 MMOL/L (ref 0.36–1.25)
LDH SERPL L TO P-CCNC: 4.6 MMOL/L (ref 0.36–1.25)
LDH SERPL L TO P-CCNC: 5.15 MMOL/L (ref 0.36–1.25)
LDH SERPL L TO P-CCNC: 5.23 MMOL/L (ref 0.36–1.25)
LDH SERPL L TO P-CCNC: 6.21 MMOL/L (ref 0.36–1.25)
LDH SERPL L TO P-CCNC: 6.22 MMOL/L (ref 0.36–1.25)
LYMPHOCYTES # BLD AUTO: 0.3 K/UL
LYMPHOCYTES # BLD AUTO: 0.4 K/UL
LYMPHOCYTES # BLD AUTO: 0.6 K/UL
LYMPHOCYTES # BLD AUTO: 1.2 K/UL
LYMPHOCYTES NFR BLD: 1.3 %
LYMPHOCYTES NFR BLD: 2.6 %
LYMPHOCYTES NFR BLD: 4.6 %
LYMPHOCYTES NFR BLD: 8.9 %
MAGNESIUM SERPL-MCNC: 1.7 MG/DL
MAGNESIUM SERPL-MCNC: 2 MG/DL
MAGNESIUM SERPL-MCNC: 2.1 MG/DL
MAGNESIUM SERPL-MCNC: 2.2 MG/DL
MCH RBC QN AUTO: 27 PG
MCH RBC QN AUTO: 27.1 PG
MCH RBC QN AUTO: 27.7 PG
MCH RBC QN AUTO: 27.9 PG
MCHC RBC AUTO-ENTMCNC: 31.8 G/DL
MCHC RBC AUTO-ENTMCNC: 31.9 G/DL
MCHC RBC AUTO-ENTMCNC: 32.6 G/DL
MCHC RBC AUTO-ENTMCNC: 33.1 G/DL
MCV RBC AUTO: 84 FL
MCV RBC AUTO: 85 FL
MODE: ABNORMAL
MONOCYTES # BLD AUTO: 0.6 K/UL
MONOCYTES # BLD AUTO: 1.3 K/UL
MONOCYTES # BLD AUTO: 1.5 K/UL
MONOCYTES # BLD AUTO: 1.7 K/UL
MONOCYTES NFR BLD: 6.1 %
MONOCYTES NFR BLD: 8 %
MONOCYTES NFR BLD: 8.2 %
MONOCYTES NFR BLD: 9.2 %
NEUTROPHILS # BLD AUTO: 14.6 K/UL
NEUTROPHILS # BLD AUTO: 18.1 K/UL
NEUTROPHILS # BLD AUTO: 22.4 K/UL
NEUTROPHILS # BLD AUTO: 5.4 K/UL
NEUTROPHILS NFR BLD: 81.3 %
NEUTROPHILS NFR BLD: 88 %
NEUTROPHILS NFR BLD: 88.8 %
NEUTROPHILS NFR BLD: 89.6 %
NRBC BLD-RTO: 0 /100 WBC
PCO2 BLDA: 35.4 MMHG (ref 35–45)
PCO2 BLDA: 36.2 MMHG (ref 35–45)
PCO2 BLDA: 37.1 MMHG (ref 35–45)
PCO2 BLDA: 37.3 MMHG (ref 35–45)
PCO2 BLDA: 37.8 MMHG (ref 35–45)
PCO2 BLDA: 38.1 MMHG (ref 35–45)
PCO2 BLDA: 38.5 MMHG (ref 35–45)
PCO2 BLDA: 41.1 MMHG (ref 35–45)
PCO2 BLDA: 43.9 MMHG (ref 35–45)
PCO2 BLDA: 44.7 MMHG (ref 35–45)
PCO2 BLDA: 45 MMHG (ref 35–45)
PCO2 BLDA: 45 MMHG (ref 35–45)
PCO2 BLDA: 46.4 MMHG (ref 35–45)
PCO2 BLDA: 48 MMHG (ref 35–45)
PCO2 BLDA: 48.1 MMHG (ref 35–45)
PCO2 BLDA: 48.3 MMHG (ref 35–45)
PCO2 BLDA: 49.6 MMHG (ref 35–45)
PCO2 BLDA: 50.4 MMHG (ref 35–45)
PCO2 BLDA: 51.3 MMHG (ref 35–45)
PEEP: 5
PH SMN: 7.36 [PH] (ref 7.35–7.45)
PH SMN: 7.38 [PH] (ref 7.35–7.45)
PH SMN: 7.39 [PH] (ref 7.35–7.45)
PH SMN: 7.39 [PH] (ref 7.35–7.45)
PH SMN: 7.41 [PH] (ref 7.35–7.45)
PH SMN: 7.41 [PH] (ref 7.35–7.45)
PH SMN: 7.42 [PH] (ref 7.35–7.45)
PH SMN: 7.44 [PH] (ref 7.35–7.45)
PH SMN: 7.46 [PH] (ref 7.35–7.45)
PH SMN: 7.47 [PH] (ref 7.35–7.45)
PH SMN: 7.48 [PH] (ref 7.35–7.45)
PH SMN: 7.48 [PH] (ref 7.35–7.45)
PH SMN: 7.5 [PH] (ref 7.35–7.45)
PH SMN: 7.51 [PH] (ref 7.35–7.45)
PH SMN: 7.59 [PH] (ref 7.35–7.45)
PHOSPHATE SERPL-MCNC: 1.9 MG/DL
PHOSPHATE SERPL-MCNC: 2.1 MG/DL
PHOSPHATE SERPL-MCNC: 3.4 MG/DL
PIP: 22
PIP: 24
PIP: 27
PIP: 27
PIP: 28
PIP: 29
PIP: 29
PIP: 30
PIP: 31
PLATELET # BLD AUTO: 120 K/UL
PLATELET # BLD AUTO: 142 K/UL
PLATELET # BLD AUTO: 155 K/UL
PLATELET # BLD AUTO: 174 K/UL
PMV BLD AUTO: 10.8 FL
PMV BLD AUTO: 11.5 FL
PMV BLD AUTO: 11.6 FL
PMV BLD AUTO: 11.6 FL
PO2 BLDA: 173 MMHG (ref 80–100)
PO2 BLDA: 174 MMHG (ref 80–100)
PO2 BLDA: 177 MMHG (ref 80–100)
PO2 BLDA: 188 MMHG (ref 80–100)
PO2 BLDA: 189 MMHG (ref 80–100)
PO2 BLDA: 197 MMHG (ref 80–100)
PO2 BLDA: 205 MMHG (ref 80–100)
PO2 BLDA: 205 MMHG (ref 80–100)
PO2 BLDA: 211 MMHG (ref 80–100)
PO2 BLDA: 212 MMHG (ref 80–100)
PO2 BLDA: 214 MMHG (ref 80–100)
PO2 BLDA: 34 MMHG (ref 40–60)
PO2 BLDA: 35 MMHG (ref 40–60)
PO2 BLDA: 36 MMHG (ref 40–60)
PO2 BLDA: 36 MMHG (ref 40–60)
PO2 BLDA: 400 MMHG (ref 80–100)
PO2 BLDA: 54 MMHG (ref 40–60)
PO2 BLDA: 68 MMHG (ref 80–100)
PO2 BLDA: 72 MMHG (ref 80–100)
POC BE: 11 MMOL/L
POC BE: 13 MMOL/L
POC BE: 2 MMOL/L
POC BE: 3 MMOL/L
POC BE: 4 MMOL/L
POC BE: 4 MMOL/L
POC BE: 5 MMOL/L
POC BE: 5 MMOL/L
POC BE: 6 MMOL/L
POC BE: 6 MMOL/L
POC BE: 7 MMOL/L
POC BE: 7 MMOL/L
POC BE: 9 MMOL/L
POC IONIZED CALCIUM: 1.03 MMOL/L (ref 1.06–1.42)
POC IONIZED CALCIUM: 1.09 MMOL/L (ref 1.06–1.42)
POC IONIZED CALCIUM: 1.12 MMOL/L (ref 1.06–1.42)
POC IONIZED CALCIUM: 1.12 MMOL/L (ref 1.06–1.42)
POC IONIZED CALCIUM: 1.13 MMOL/L (ref 1.06–1.42)
POC IONIZED CALCIUM: 1.15 MMOL/L (ref 1.06–1.42)
POC IONIZED CALCIUM: 1.17 MMOL/L (ref 1.06–1.42)
POC SATURATED O2: 100 % (ref 95–100)
POC SATURATED O2: 67 % (ref 95–100)
POC SATURATED O2: 68 % (ref 95–100)
POC SATURATED O2: 71 % (ref 95–100)
POC SATURATED O2: 73 % (ref 95–100)
POC SATURATED O2: 89 % (ref 95–100)
POC SATURATED O2: 93 % (ref 95–100)
POC SATURATED O2: 94 % (ref 95–100)
POC TCO2: 28 MMOL/L (ref 23–27)
POC TCO2: 29 MMOL/L (ref 23–27)
POC TCO2: 30 MMOL/L (ref 23–27)
POC TCO2: 30 MMOL/L (ref 23–27)
POC TCO2: 31 MMOL/L (ref 23–27)
POC TCO2: 32 MMOL/L (ref 23–27)
POC TCO2: 32 MMOL/L (ref 23–27)
POC TCO2: 32 MMOL/L (ref 24–29)
POC TCO2: 33 MMOL/L (ref 23–27)
POC TCO2: 34 MMOL/L (ref 24–29)
POC TCO2: 35 MMOL/L (ref 23–27)
POC TCO2: 36 MMOL/L (ref 24–29)
POCT GLUCOSE: 163 MG/DL (ref 70–110)
POCT GLUCOSE: 172 MG/DL (ref 70–110)
POCT GLUCOSE: 173 MG/DL (ref 70–110)
POCT GLUCOSE: 178 MG/DL (ref 70–110)
POCT GLUCOSE: 186 MG/DL (ref 70–110)
POCT GLUCOSE: 188 MG/DL (ref 70–110)
POCT GLUCOSE: 192 MG/DL (ref 70–110)
POCT GLUCOSE: 195 MG/DL (ref 70–110)
POCT GLUCOSE: 202 MG/DL (ref 70–110)
POCT GLUCOSE: 209 MG/DL (ref 70–110)
POTASSIUM BLD-SCNC: 2.8 MMOL/L (ref 3.5–5.1)
POTASSIUM BLD-SCNC: 2.8 MMOL/L (ref 3.5–5.1)
POTASSIUM BLD-SCNC: 3 MMOL/L (ref 3.5–5.1)
POTASSIUM BLD-SCNC: 3 MMOL/L (ref 3.5–5.1)
POTASSIUM BLD-SCNC: 3.4 MMOL/L (ref 3.5–5.1)
POTASSIUM BLD-SCNC: 3.5 MMOL/L (ref 3.5–5.1)
POTASSIUM BLD-SCNC: 3.7 MMOL/L (ref 3.5–5.1)
POTASSIUM SERPL-SCNC: 3 MMOL/L
POTASSIUM SERPL-SCNC: 3.3 MMOL/L
POTASSIUM SERPL-SCNC: 3.3 MMOL/L
POTASSIUM SERPL-SCNC: 3.4 MMOL/L
PROT SERPL-MCNC: 7.7 G/DL
PROTHROMBIN TIME: 10.8 SEC
PROTHROMBIN TIME: 11.5 SEC
PROTHROMBIN TIME: 11.7 SEC
PROTHROMBIN TIME: 12.1 SEC
PS: 10
RBC # BLD AUTO: 3.69 M/UL
RBC # BLD AUTO: 3.91 M/UL
RBC # BLD AUTO: 3.97 M/UL
RBC # BLD AUTO: 4.23 M/UL
SAMPLE: ABNORMAL
SITE: ABNORMAL
SODIUM BLD-SCNC: 133 MMOL/L (ref 136–145)
SODIUM BLD-SCNC: 134 MMOL/L (ref 136–145)
SODIUM BLD-SCNC: 134 MMOL/L (ref 136–145)
SODIUM BLD-SCNC: 135 MMOL/L (ref 136–145)
SODIUM BLD-SCNC: 137 MMOL/L (ref 136–145)
SODIUM SERPL-SCNC: 133 MMOL/L
SODIUM SERPL-SCNC: 134 MMOL/L
SODIUM SERPL-SCNC: 134 MMOL/L
SODIUM SERPL-SCNC: 137 MMOL/L
SP02: 100
SP02: 98
SP02: 99
VT: 500
VT: 550
WBC # BLD AUTO: 16.47 K/UL
WBC # BLD AUTO: 20.16 K/UL
WBC # BLD AUTO: 25.45 K/UL
WBC # BLD AUTO: 6.66 K/UL

## 2018-03-08 PROCEDURE — P9045 ALBUMIN (HUMAN), 5%, 250 ML: HCPCS | Mod: JG | Performed by: THORACIC SURGERY (CARDIOTHORACIC VASCULAR SURGERY)

## 2018-03-08 PROCEDURE — 93503 INSERT/PLACE HEART CATHETER: CPT | Performed by: ANESTHESIOLOGY

## 2018-03-08 PROCEDURE — 25000003 PHARM REV CODE 250: Performed by: PHYSICIAN ASSISTANT

## 2018-03-08 PROCEDURE — 63600367 HC NITRIC OXIDE PER HOUR

## 2018-03-08 PROCEDURE — 85610 PROTHROMBIN TIME: CPT

## 2018-03-08 PROCEDURE — 83036 HEMOGLOBIN GLYCOSYLATED A1C: CPT

## 2018-03-08 PROCEDURE — 02HA0QZ INSERTION OF IMPLANTABLE HEART ASSIST SYSTEM INTO HEART, OPEN APPROACH: ICD-10-PCS | Performed by: THORACIC SURGERY (CARDIOTHORACIC VASCULAR SURGERY)

## 2018-03-08 PROCEDURE — 36000713 HC OR TIME LEV V EA ADD 15 MIN: Performed by: THORACIC SURGERY (CARDIOTHORACIC VASCULAR SURGERY)

## 2018-03-08 PROCEDURE — 82330 ASSAY OF CALCIUM: CPT

## 2018-03-08 PROCEDURE — 93010 ELECTROCARDIOGRAM REPORT: CPT | Mod: ,,, | Performed by: INTERNAL MEDICINE

## 2018-03-08 PROCEDURE — 99255 IP/OBS CONSLTJ NEW/EST HI 80: CPT | Mod: ,,, | Performed by: THORACIC SURGERY (CARDIOTHORACIC VASCULAR SURGERY)

## 2018-03-08 PROCEDURE — 27100025 HC TUBING, SET FLUID WARMER: Performed by: STUDENT IN AN ORGANIZED HEALTH CARE EDUCATION/TRAINING PROGRAM

## 2018-03-08 PROCEDURE — 99233 SBSQ HOSP IP/OBS HIGH 50: CPT | Mod: ,,, | Performed by: INTERNAL MEDICINE

## 2018-03-08 PROCEDURE — 27000175 HC ADULT BYPASS PUMP

## 2018-03-08 PROCEDURE — 63600175 PHARM REV CODE 636 W HCPCS: Performed by: STUDENT IN AN ORGANIZED HEALTH CARE EDUCATION/TRAINING PROGRAM

## 2018-03-08 PROCEDURE — A4216 STERILE WATER/SALINE, 10 ML: HCPCS | Performed by: THORACIC SURGERY (CARDIOTHORACIC VASCULAR SURGERY)

## 2018-03-08 PROCEDURE — 25000003 PHARM REV CODE 250: Performed by: THORACIC SURGERY (CARDIOTHORACIC VASCULAR SURGERY)

## 2018-03-08 PROCEDURE — 25000003 PHARM REV CODE 250: Performed by: STUDENT IN AN ORGANIZED HEALTH CARE EDUCATION/TRAINING PROGRAM

## 2018-03-08 PROCEDURE — 27200651 HC AIRWAY, LMA: Performed by: STUDENT IN AN ORGANIZED HEALTH CARE EDUCATION/TRAINING PROGRAM

## 2018-03-08 PROCEDURE — 37000008 HC ANESTHESIA 1ST 15 MINUTES: Performed by: THORACIC SURGERY (CARDIOTHORACIC VASCULAR SURGERY)

## 2018-03-08 PROCEDURE — 27200678 HC TRANSDUCER MONITOR KIT TRIPLE: Performed by: STUDENT IN AN ORGANIZED HEALTH CARE EDUCATION/TRAINING PROGRAM

## 2018-03-08 PROCEDURE — 36592 COLLECT BLOOD FROM PICC: CPT

## 2018-03-08 PROCEDURE — 27000221 HC OXYGEN, UP TO 24 HOURS

## 2018-03-08 PROCEDURE — 99223 1ST HOSP IP/OBS HIGH 75: CPT | Mod: ,,, | Performed by: INTERNAL MEDICINE

## 2018-03-08 PROCEDURE — 85610 PROTHROMBIN TIME: CPT | Mod: 91

## 2018-03-08 PROCEDURE — 36620 INSERTION CATHETER ARTERY: CPT | Mod: 59,,, | Performed by: ANESTHESIOLOGY

## 2018-03-08 PROCEDURE — 99900035 HC TECH TIME PER 15 MIN (STAT)

## 2018-03-08 PROCEDURE — 84134 ASSAY OF PREALBUMIN: CPT

## 2018-03-08 PROCEDURE — 85014 HEMATOCRIT: CPT

## 2018-03-08 PROCEDURE — 85002 BLEEDING TIME TEST: CPT

## 2018-03-08 PROCEDURE — 93503 INSERT/PLACE HEART CATHETER: CPT | Mod: 59,,, | Performed by: ANESTHESIOLOGY

## 2018-03-08 PROCEDURE — 37799 UNLISTED PX VASCULAR SURGERY: CPT

## 2018-03-08 PROCEDURE — C1729 CATH, DRAINAGE: HCPCS | Performed by: THORACIC SURGERY (CARDIOTHORACIC VASCULAR SURGERY)

## 2018-03-08 PROCEDURE — 93005 ELECTROCARDIOGRAM TRACING: CPT

## 2018-03-08 PROCEDURE — 80048 BASIC METABOLIC PNL TOTAL CA: CPT | Mod: 91

## 2018-03-08 PROCEDURE — A4216 STERILE WATER/SALINE, 10 ML: HCPCS | Performed by: PHYSICIAN ASSISTANT

## 2018-03-08 PROCEDURE — 84100 ASSAY OF PHOSPHORUS: CPT | Mod: 91

## 2018-03-08 PROCEDURE — 99900026 HC AIRWAY MAINTENANCE (STAT)

## 2018-03-08 PROCEDURE — 84295 ASSAY OF SERUM SODIUM: CPT

## 2018-03-08 PROCEDURE — D9220A PRA ANESTHESIA: Mod: ,,, | Performed by: ANESTHESIOLOGY

## 2018-03-08 PROCEDURE — C1751 CATH, INF, PER/CENT/MIDLINE: HCPCS | Performed by: STUDENT IN AN ORGANIZED HEALTH CARE EDUCATION/TRAINING PROGRAM

## 2018-03-08 PROCEDURE — 63600175 PHARM REV CODE 636 W HCPCS: Performed by: THORACIC SURGERY (CARDIOTHORACIC VASCULAR SURGERY)

## 2018-03-08 PROCEDURE — 27100088 HC CELL SAVER

## 2018-03-08 PROCEDURE — 25000003 PHARM REV CODE 250: Performed by: ANESTHESIOLOGY

## 2018-03-08 PROCEDURE — 33979 INSERT INTRACORPOREAL DEVICE: CPT | Mod: ,,, | Performed by: THORACIC SURGERY (CARDIOTHORACIC VASCULAR SURGERY)

## 2018-03-08 PROCEDURE — 85520 HEPARIN ASSAY: CPT

## 2018-03-08 PROCEDURE — 5A1221Z PERFORMANCE OF CARDIAC OUTPUT, CONTINUOUS: ICD-10-PCS | Performed by: THORACIC SURGERY (CARDIOTHORACIC VASCULAR SURGERY)

## 2018-03-08 PROCEDURE — 83615 LACTATE (LD) (LDH) ENZYME: CPT

## 2018-03-08 PROCEDURE — 27201015 HC HEMO-CONCENTRATOR

## 2018-03-08 PROCEDURE — 27000191 HC C-V MONITORING

## 2018-03-08 PROCEDURE — 88305 TISSUE EXAM BY PATHOLOGIST: CPT | Performed by: PATHOLOGY

## 2018-03-08 PROCEDURE — 37000009 HC ANESTHESIA EA ADD 15 MINS: Performed by: THORACIC SURGERY (CARDIOTHORACIC VASCULAR SURGERY)

## 2018-03-08 PROCEDURE — 27201037 HC PRESSURE MONITORING SET UP

## 2018-03-08 PROCEDURE — 27400018 *HC HEARTMATE II PT SUPPORT KIT

## 2018-03-08 PROCEDURE — 85025 COMPLETE CBC W/AUTO DIFF WBC: CPT | Mod: 91

## 2018-03-08 PROCEDURE — C1768 GRAFT, VASCULAR: HCPCS | Performed by: THORACIC SURGERY (CARDIOTHORACIC VASCULAR SURGERY)

## 2018-03-08 PROCEDURE — 83735 ASSAY OF MAGNESIUM: CPT | Mod: 91

## 2018-03-08 PROCEDURE — 82803 BLOOD GASES ANY COMBINATION: CPT

## 2018-03-08 PROCEDURE — 83735 ASSAY OF MAGNESIUM: CPT

## 2018-03-08 PROCEDURE — 94002 VENT MGMT INPAT INIT DAY: CPT

## 2018-03-08 PROCEDURE — 93463 DRUG ADMIN & HEMODYNMIC MEAS: CPT

## 2018-03-08 PROCEDURE — 20000000 HC ICU ROOM

## 2018-03-08 PROCEDURE — 27800596 HC HEARTMATE II - IMPLANT KIT

## 2018-03-08 PROCEDURE — 80053 COMPREHEN METABOLIC PANEL: CPT

## 2018-03-08 PROCEDURE — 85730 THROMBOPLASTIN TIME PARTIAL: CPT | Mod: 91

## 2018-03-08 PROCEDURE — 36000712 HC OR TIME LEV V 1ST 15 MIN: Performed by: THORACIC SURGERY (CARDIOTHORACIC VASCULAR SURGERY)

## 2018-03-08 PROCEDURE — 83605 ASSAY OF LACTIC ACID: CPT

## 2018-03-08 PROCEDURE — 83050 HGB METHEMOGLOBIN QUAN: CPT

## 2018-03-08 PROCEDURE — 27201423 OPTIME MED/SURG SUP & DEVICES STERILE SUPPLY: Performed by: THORACIC SURGERY (CARDIOTHORACIC VASCULAR SURGERY)

## 2018-03-08 PROCEDURE — 84132 ASSAY OF SERUM POTASSIUM: CPT

## 2018-03-08 PROCEDURE — 99223 1ST HOSP IP/OBS HIGH 75: CPT | Mod: ,,, | Performed by: ANESTHESIOLOGY

## 2018-03-08 DEVICE — FELT TEFLON 1INX6IN: Type: IMPLANTABLE DEVICE | Site: HEART | Status: FUNCTIONAL

## 2018-03-08 RX ORDER — ALBUMIN HUMAN 50 G/1000ML
25 SOLUTION INTRAVENOUS ONCE
Status: COMPLETED | OUTPATIENT
Start: 2018-03-08 | End: 2018-03-08

## 2018-03-08 RX ORDER — SUCCINYLCHOLINE CHLORIDE 20 MG/ML
INJECTION INTRAMUSCULAR; INTRAVENOUS
Status: DISCONTINUED | OUTPATIENT
Start: 2018-03-08 | End: 2018-03-08

## 2018-03-08 RX ORDER — GLYCOPYRROLATE 0.2 MG/ML
INJECTION INTRAMUSCULAR; INTRAVENOUS
Status: DISCONTINUED | OUTPATIENT
Start: 2018-03-08 | End: 2018-03-08

## 2018-03-08 RX ORDER — FLUCONAZOLE 2 MG/ML
400 INJECTION, SOLUTION INTRAVENOUS
Status: DISCONTINUED | OUTPATIENT
Start: 2018-03-08 | End: 2018-03-08 | Stop reason: HOSPADM

## 2018-03-08 RX ORDER — SODIUM CHLORIDE 0.9 % (FLUSH) 0.9 %
3 SYRINGE (ML) INJECTION EVERY 8 HOURS
Status: DISCONTINUED | OUTPATIENT
Start: 2018-03-08 | End: 2018-03-21

## 2018-03-08 RX ORDER — CEFEPIME HYDROCHLORIDE 1 G/1
1 INJECTION, POWDER, FOR SOLUTION INTRAMUSCULAR; INTRAVENOUS
Status: DISCONTINUED | OUTPATIENT
Start: 2018-03-09 | End: 2018-03-12

## 2018-03-08 RX ORDER — SODIUM CHLORIDE 9 MG/ML
INJECTION, SOLUTION INTRAVENOUS CONTINUOUS
Status: DISCONTINUED | OUTPATIENT
Start: 2018-03-08 | End: 2018-03-12

## 2018-03-08 RX ORDER — ACETAMINOPHEN 10 MG/ML
INJECTION, SOLUTION INTRAVENOUS
Status: DISCONTINUED | OUTPATIENT
Start: 2018-03-08 | End: 2018-03-08

## 2018-03-08 RX ORDER — MAGNESIUM SULFATE/D5W 2 G/50 ML
2 INTRAVENOUS SOLUTION, PIGGYBACK (ML) INTRAVENOUS ONCE
Status: COMPLETED | OUTPATIENT
Start: 2018-03-08 | End: 2018-03-08

## 2018-03-08 RX ORDER — ADHESIVE BANDAGE
30 BANDAGE TOPICAL DAILY PRN
Status: DISCONTINUED | OUTPATIENT
Start: 2018-03-08 | End: 2018-03-26 | Stop reason: HOSPADM

## 2018-03-08 RX ORDER — ALBUTEROL SULFATE 0.83 MG/ML
2.5 SOLUTION RESPIRATORY (INHALATION) EVERY 4 HOURS PRN
Status: DISCONTINUED | OUTPATIENT
Start: 2018-03-08 | End: 2018-03-08 | Stop reason: SDUPTHER

## 2018-03-08 RX ORDER — NOREPINEPHRINE BITARTRATE 1 MG/ML
INJECTION, SOLUTION INTRAVENOUS
Status: DISCONTINUED | OUTPATIENT
Start: 2018-03-08 | End: 2018-03-08

## 2018-03-08 RX ORDER — ALBUTEROL SULFATE 0.83 MG/ML
2.5 SOLUTION RESPIRATORY (INHALATION) EVERY 4 HOURS PRN
Status: DISCONTINUED | OUTPATIENT
Start: 2018-03-08 | End: 2018-03-26 | Stop reason: HOSPADM

## 2018-03-08 RX ORDER — FUROSEMIDE 10 MG/ML
10 INJECTION INTRAMUSCULAR; INTRAVENOUS ONCE
Status: COMPLETED | OUTPATIENT
Start: 2018-03-08 | End: 2018-03-08

## 2018-03-08 RX ORDER — BACITRACIN 50000 [IU]/1
INJECTION, POWDER, FOR SOLUTION INTRAMUSCULAR
Status: DISCONTINUED | OUTPATIENT
Start: 2018-03-08 | End: 2018-03-08 | Stop reason: HOSPADM

## 2018-03-08 RX ORDER — LIDOCAINE HCL/PF 100 MG/5ML
SYRINGE (ML) INTRAVENOUS
Status: DISCONTINUED | OUTPATIENT
Start: 2018-03-08 | End: 2018-03-08

## 2018-03-08 RX ORDER — DEXTROSE MONOHYDRATE, SODIUM CHLORIDE, AND POTASSIUM CHLORIDE 50; 2.98; 4.5 G/1000ML; G/1000ML; G/1000ML
INJECTION, SOLUTION INTRAVENOUS CONTINUOUS
Status: DISCONTINUED | OUTPATIENT
Start: 2018-03-08 | End: 2018-03-10

## 2018-03-08 RX ORDER — FLUCONAZOLE 2 MG/ML
INJECTION, SOLUTION INTRAVENOUS CONTINUOUS PRN
Status: DISCONTINUED | OUTPATIENT
Start: 2018-03-08 | End: 2018-03-08 | Stop reason: HOSPADM

## 2018-03-08 RX ORDER — EPINEPHRINE 1 MG/ML
INJECTION, SOLUTION INTRACARDIAC; INTRAMUSCULAR; INTRAVENOUS; SUBCUTANEOUS
Status: DISCONTINUED | OUTPATIENT
Start: 2018-03-08 | End: 2018-03-08

## 2018-03-08 RX ORDER — PROPOFOL 10 MG/ML
VIAL (ML) INTRAVENOUS CONTINUOUS PRN
Status: DISCONTINUED | OUTPATIENT
Start: 2018-03-08 | End: 2018-03-08

## 2018-03-08 RX ORDER — NOREPINEPHRINE BITARTRATE/D5W 4MG/250ML
0.02 PLASTIC BAG, INJECTION (ML) INTRAVENOUS CONTINUOUS
Status: DISCONTINUED | OUTPATIENT
Start: 2018-03-08 | End: 2018-03-11

## 2018-03-08 RX ORDER — NICARDIPINE HYDROCHLORIDE 0.2 MG/ML
INJECTION INTRAVENOUS CONTINUOUS PRN
Status: DISCONTINUED | OUTPATIENT
Start: 2018-03-08 | End: 2018-03-08

## 2018-03-08 RX ORDER — FENTANYL CITRATE 50 UG/ML
25 INJECTION, SOLUTION INTRAMUSCULAR; INTRAVENOUS
Status: DISCONTINUED | OUTPATIENT
Start: 2018-03-08 | End: 2018-03-12

## 2018-03-08 RX ORDER — MIDAZOLAM HYDROCHLORIDE 1 MG/ML
INJECTION, SOLUTION INTRAMUSCULAR; INTRAVENOUS
Status: DISCONTINUED | OUTPATIENT
Start: 2018-03-08 | End: 2018-03-08

## 2018-03-08 RX ORDER — TRANEXAMIC ACID 100 MG/ML
INJECTION, SOLUTION INTRAVENOUS
Status: DISCONTINUED | OUTPATIENT
Start: 2018-03-08 | End: 2018-03-08

## 2018-03-08 RX ORDER — OXYCODONE HYDROCHLORIDE 5 MG/1
5 TABLET ORAL EVERY 4 HOURS PRN
Status: DISCONTINUED | OUTPATIENT
Start: 2018-03-08 | End: 2018-03-26 | Stop reason: HOSPADM

## 2018-03-08 RX ORDER — MUPIROCIN 20 MG/G
OINTMENT TOPICAL 2 TIMES DAILY
Status: COMPLETED | OUTPATIENT
Start: 2018-03-08 | End: 2018-03-13

## 2018-03-08 RX ORDER — HEPARIN SODIUM 1000 [USP'U]/ML
INJECTION, SOLUTION INTRAVENOUS; SUBCUTANEOUS
Status: DISCONTINUED | OUTPATIENT
Start: 2018-03-08 | End: 2018-03-08

## 2018-03-08 RX ORDER — NEOSTIGMINE METHYLSULFATE 1 MG/ML
INJECTION, SOLUTION INTRAVENOUS
Status: DISCONTINUED | OUTPATIENT
Start: 2018-03-08 | End: 2018-03-08

## 2018-03-08 RX ORDER — POTASSIUM CHLORIDE 14.9 MG/ML
INJECTION INTRAVENOUS CONTINUOUS PRN
Status: DISCONTINUED | OUTPATIENT
Start: 2018-03-08 | End: 2018-03-08

## 2018-03-08 RX ORDER — PROTAMINE SULFATE 10 MG/ML
INJECTION, SOLUTION INTRAVENOUS
Status: DISCONTINUED | OUTPATIENT
Start: 2018-03-08 | End: 2018-03-08

## 2018-03-08 RX ORDER — FERROUS GLUCONATE 324(38)MG
324 TABLET ORAL
Status: DISCONTINUED | OUTPATIENT
Start: 2018-03-09 | End: 2018-03-26 | Stop reason: HOSPADM

## 2018-03-08 RX ORDER — DOCUSATE SODIUM 100 MG/1
200 CAPSULE, LIQUID FILLED ORAL NIGHTLY
Status: DISCONTINUED | OUTPATIENT
Start: 2018-03-08 | End: 2018-03-26 | Stop reason: HOSPADM

## 2018-03-08 RX ORDER — PROPOFOL 10 MG/ML
10 INJECTION, EMULSION INTRAVENOUS CONTINUOUS
Status: DISCONTINUED | OUTPATIENT
Start: 2018-03-08 | End: 2018-03-11

## 2018-03-08 RX ORDER — ETOMIDATE 2 MG/ML
INJECTION INTRAVENOUS
Status: DISCONTINUED | OUTPATIENT
Start: 2018-03-08 | End: 2018-03-08

## 2018-03-08 RX ORDER — DOBUTAMINE HYDROCHLORIDE 400 MG/100ML
4 INJECTION, SOLUTION INTRAVENOUS CONTINUOUS
Status: DISCONTINUED | OUTPATIENT
Start: 2018-03-08 | End: 2018-03-20

## 2018-03-08 RX ORDER — BISACODYL 10 MG
10 SUPPOSITORY, RECTAL RECTAL DAILY PRN
Status: DISCONTINUED | OUTPATIENT
Start: 2018-03-08 | End: 2018-03-26 | Stop reason: HOSPADM

## 2018-03-08 RX ORDER — ONDANSETRON 2 MG/ML
INJECTION INTRAMUSCULAR; INTRAVENOUS
Status: DISCONTINUED | OUTPATIENT
Start: 2018-03-08 | End: 2018-03-08

## 2018-03-08 RX ORDER — PANTOPRAZOLE SODIUM 40 MG/1
40 TABLET, DELAYED RELEASE ORAL DAILY
Status: DISCONTINUED | OUTPATIENT
Start: 2018-03-08 | End: 2018-03-26 | Stop reason: HOSPADM

## 2018-03-08 RX ORDER — TRANEXAMIC ACID 100 MG/ML
INJECTION, SOLUTION INTRAVENOUS CONTINUOUS PRN
Status: DISCONTINUED | OUTPATIENT
Start: 2018-03-08 | End: 2018-03-08

## 2018-03-08 RX ORDER — FENTANYL CITRATE 50 UG/ML
INJECTION, SOLUTION INTRAMUSCULAR; INTRAVENOUS
Status: DISCONTINUED | OUTPATIENT
Start: 2018-03-08 | End: 2018-03-08

## 2018-03-08 RX ORDER — POTASSIUM CHLORIDE 14.9 MG/ML
40 INJECTION INTRAVENOUS ONCE
Status: COMPLETED | OUTPATIENT
Start: 2018-03-08 | End: 2018-03-08

## 2018-03-08 RX ORDER — PANTOPRAZOLE SODIUM 40 MG/10ML
40 INJECTION, POWDER, LYOPHILIZED, FOR SOLUTION INTRAVENOUS DAILY
Status: DISCONTINUED | OUTPATIENT
Start: 2018-03-08 | End: 2018-03-12

## 2018-03-08 RX ORDER — OXYCODONE HYDROCHLORIDE 5 MG/1
10 TABLET ORAL EVERY 4 HOURS PRN
Status: DISCONTINUED | OUTPATIENT
Start: 2018-03-08 | End: 2018-03-26 | Stop reason: HOSPADM

## 2018-03-08 RX ORDER — ROCURONIUM BROMIDE 10 MG/ML
INJECTION, SOLUTION INTRAVENOUS
Status: DISCONTINUED | OUTPATIENT
Start: 2018-03-08 | End: 2018-03-08

## 2018-03-08 RX ORDER — POTASSIUM CHLORIDE 29.8 MG/ML
40 INJECTION INTRAVENOUS ONCE
Status: COMPLETED | OUTPATIENT
Start: 2018-03-08 | End: 2018-03-08

## 2018-03-08 RX ORDER — POTASSIUM CHLORIDE 14.9 MG/ML
INJECTION INTRAVENOUS
Status: DISPENSED
Start: 2018-03-08 | End: 2018-03-08

## 2018-03-08 RX ORDER — POTASSIUM CHLORIDE 14.9 MG/ML
60 INJECTION INTRAVENOUS ONCE
Status: COMPLETED | OUTPATIENT
Start: 2018-03-08 | End: 2018-03-08

## 2018-03-08 RX ORDER — FUROSEMIDE 10 MG/ML
10 INJECTION INTRAMUSCULAR; INTRAVENOUS ONCE
Status: DISCONTINUED | OUTPATIENT
Start: 2018-03-09 | End: 2018-03-08

## 2018-03-08 RX ORDER — DOXYCYCLINE 100 MG/10ML
INJECTION, POWDER, LYOPHILIZED, FOR SOLUTION INTRAVENOUS
Status: DISCONTINUED | OUTPATIENT
Start: 2018-03-08 | End: 2018-03-08 | Stop reason: HOSPADM

## 2018-03-08 RX ORDER — ALBUMIN HUMAN 50 G/1000ML
500 SOLUTION INTRAVENOUS
Status: DISCONTINUED | OUTPATIENT
Start: 2018-03-08 | End: 2018-03-17

## 2018-03-08 RX ORDER — NITROGLYCERIN 5 MG/ML
INJECTION, SOLUTION INTRAVENOUS
Status: DISCONTINUED | OUTPATIENT
Start: 2018-03-08 | End: 2018-03-08

## 2018-03-08 RX ORDER — NICARDIPINE HYDROCHLORIDE 0.2 MG/ML
1 INJECTION INTRAVENOUS CONTINUOUS
Status: DISCONTINUED | OUTPATIENT
Start: 2018-03-08 | End: 2018-03-12

## 2018-03-08 RX ORDER — FLUCONAZOLE 2 MG/ML
400 INJECTION, SOLUTION INTRAVENOUS
Status: DISCONTINUED | OUTPATIENT
Start: 2018-03-08 | End: 2018-03-11

## 2018-03-08 RX ADMIN — TRANEXAMIC ACID 1 MG/KG/HR: 100 INJECTION, SOLUTION INTRAVENOUS at 07:03

## 2018-03-08 RX ADMIN — EPINEPHRINE 0.04 MCG/KG/MIN: 1 INJECTION INTRAMUSCULAR; INTRAVENOUS; SUBCUTANEOUS at 07:03

## 2018-03-08 RX ADMIN — VANCOMYCIN HYDROCHLORIDE 750 MG: 750 INJECTION, POWDER, LYOPHILIZED, FOR SOLUTION INTRAVENOUS at 08:03

## 2018-03-08 RX ADMIN — ROCURONIUM BROMIDE 30 MG: 10 INJECTION, SOLUTION INTRAVENOUS at 11:03

## 2018-03-08 RX ADMIN — SODIUM CHLORIDE, SODIUM GLUCONATE, SODIUM ACETATE, POTASSIUM CHLORIDE, MAGNESIUM CHLORIDE, SODIUM PHOSPHATE, DIBASIC, AND POTASSIUM PHOSPHATE: .53; .5; .37; .037; .03; .012; .00082 INJECTION, SOLUTION INTRAVENOUS at 07:03

## 2018-03-08 RX ADMIN — POTASSIUM CHLORIDE 60 MEQ: 200 INJECTION, SOLUTION INTRAVENOUS at 02:03

## 2018-03-08 RX ADMIN — Medication 3 ML: at 02:03

## 2018-03-08 RX ADMIN — POTASSIUM CHLORIDE 40 MEQ: 29.8 INJECTION, SOLUTION INTRAVENOUS at 02:03

## 2018-03-08 RX ADMIN — GLYCOPYRROLATE 0.6 MG: 0.2 INJECTION, SOLUTION INTRAMUSCULAR; INTRAVENOUS at 01:03

## 2018-03-08 RX ADMIN — MIDAZOLAM HYDROCHLORIDE 2 MG: 1 INJECTION, SOLUTION INTRAMUSCULAR; INTRAVENOUS at 07:03

## 2018-03-08 RX ADMIN — CALCIUM CHLORIDE 500 MG: 100 INJECTION, SOLUTION INTRAVENOUS at 12:03

## 2018-03-08 RX ADMIN — NITROGLYCERIN 25 MCG: 5 INJECTION, SOLUTION INTRAVENOUS at 10:03

## 2018-03-08 RX ADMIN — PROTAMINE SULFATE 260 MG: 10 INJECTION, SOLUTION INTRAVENOUS at 01:03

## 2018-03-08 RX ADMIN — TRANEXAMIC ACID 1250 MG: 100 INJECTION, SOLUTION INTRAVENOUS at 07:03

## 2018-03-08 RX ADMIN — ALBUMIN (HUMAN) 25 G: 12.5 SOLUTION INTRAVENOUS at 06:03

## 2018-03-08 RX ADMIN — FLUCONAZOLE 400 MG: 2 INJECTION INTRAVENOUS at 08:03

## 2018-03-08 RX ADMIN — PROPOFOL 40 MCG/KG/MIN: 10 INJECTION, EMULSION INTRAVENOUS at 01:03

## 2018-03-08 RX ADMIN — LIDOCAINE HYDROCHLORIDE 100 MG: 20 INJECTION, SOLUTION INTRAVENOUS at 12:03

## 2018-03-08 RX ADMIN — NEOSTIGMINE METHYLSULFATE 5 MG: 1 INJECTION INTRAVENOUS at 01:03

## 2018-03-08 RX ADMIN — CEFEPIME 1 G: 1 INJECTION, POWDER, FOR SOLUTION INTRAMUSCULAR; INTRAVENOUS at 08:03

## 2018-03-08 RX ADMIN — Medication 3 ML: at 10:03

## 2018-03-08 RX ADMIN — PHYTONADIONE 10 MG: 10 INJECTION, EMULSION INTRAMUSCULAR; INTRAVENOUS; SUBCUTANEOUS at 12:03

## 2018-03-08 RX ADMIN — NICARDIPINE HYDROCHLORIDE 5 MG/HR: 0.2 INJECTION, SOLUTION INTRAVENOUS at 12:03

## 2018-03-08 RX ADMIN — FENTANYL CITRATE 150 MCG: 50 INJECTION, SOLUTION INTRAMUSCULAR; INTRAVENOUS at 08:03

## 2018-03-08 RX ADMIN — NOREPINEPHRINE BITARTRATE 0.01 MG: 1 INJECTION, SOLUTION, CONCENTRATE INTRAVENOUS at 12:03

## 2018-03-08 RX ADMIN — FUROSEMIDE 10 MG: 10 INJECTION, SOLUTION INTRAMUSCULAR; INTRAVENOUS at 11:03

## 2018-03-08 RX ADMIN — ETOMIDATE 12 MG: 2 INJECTION, SOLUTION INTRAVENOUS at 07:03

## 2018-03-08 RX ADMIN — FENTANYL CITRATE 250 MCG: 50 INJECTION, SOLUTION INTRAMUSCULAR; INTRAVENOUS at 01:03

## 2018-03-08 RX ADMIN — CEFEPIME 1 G: 1 INJECTION, POWDER, FOR SOLUTION INTRAMUSCULAR; INTRAVENOUS at 11:03

## 2018-03-08 RX ADMIN — POTASSIUM CHLORIDE 40 MEQ: 200 INJECTION, SOLUTION INTRAVENOUS at 03:03

## 2018-03-08 RX ADMIN — ALBUMIN (HUMAN) 500 ML: 12.5 SOLUTION INTRAVENOUS at 10:03

## 2018-03-08 RX ADMIN — LIDOCAINE HYDROCHLORIDE 100 MG: 20 INJECTION, SOLUTION INTRAVENOUS at 07:03

## 2018-03-08 RX ADMIN — FENTANYL CITRATE 100 MCG: 50 INJECTION, SOLUTION INTRAMUSCULAR; INTRAVENOUS at 08:03

## 2018-03-08 RX ADMIN — EPINEPHRINE 8 MCG: 1 INJECTION, SOLUTION INTRAMUSCULAR; SUBCUTANEOUS at 07:03

## 2018-03-08 RX ADMIN — SODIUM CHLORIDE 1 UNITS/HR: 9 INJECTION, SOLUTION INTRAVENOUS at 02:03

## 2018-03-08 RX ADMIN — ROCURONIUM BROMIDE 30 MG: 10 INJECTION, SOLUTION INTRAVENOUS at 09:03

## 2018-03-08 RX ADMIN — ROCURONIUM BROMIDE 20 MG: 10 INJECTION, SOLUTION INTRAVENOUS at 12:03

## 2018-03-08 RX ADMIN — NOREPINEPHRINE BITARTRATE 0.02 MG: 1 INJECTION, SOLUTION, CONCENTRATE INTRAVENOUS at 12:03

## 2018-03-08 RX ADMIN — ROCURONIUM BROMIDE 100 MG: 10 INJECTION, SOLUTION INTRAVENOUS at 07:03

## 2018-03-08 RX ADMIN — CEFEPIME 1 G: 1 INJECTION, POWDER, FOR SOLUTION INTRAMUSCULAR; INTRAVENOUS at 12:03

## 2018-03-08 RX ADMIN — SODIUM PHOSPHATE, MONOBASIC, MONOHYDRATE 39.99 MMOL: 276; 142 INJECTION, SOLUTION INTRAVENOUS at 10:03

## 2018-03-08 RX ADMIN — ALBUMIN (HUMAN) 25 G: 12.5 SOLUTION INTRAVENOUS at 10:03

## 2018-03-08 RX ADMIN — HEPARIN SODIUM 33000 UNITS: 1000 INJECTION, SOLUTION INTRAVENOUS; SUBCUTANEOUS at 09:03

## 2018-03-08 RX ADMIN — ROCURONIUM BROMIDE 20 MG: 10 INJECTION, SOLUTION INTRAVENOUS at 09:03

## 2018-03-08 RX ADMIN — ACETAMINOPHEN 1000 MG: 10 INJECTION, SOLUTION INTRAVENOUS at 07:03

## 2018-03-08 RX ADMIN — FENTANYL CITRATE 250 MCG: 50 INJECTION, SOLUTION INTRAMUSCULAR; INTRAVENOUS at 12:03

## 2018-03-08 RX ADMIN — MUPIROCIN: 20 OINTMENT TOPICAL at 08:03

## 2018-03-08 RX ADMIN — ALBUMIN (HUMAN) 25 G: 12.5 SOLUTION INTRAVENOUS at 04:03

## 2018-03-08 RX ADMIN — EPINEPHRINE 0.07 MCG/KG/MIN: 1 INJECTION PARENTERAL at 08:03

## 2018-03-08 RX ADMIN — SODIUM CHLORIDE, SODIUM GLUCONATE, SODIUM ACETATE, POTASSIUM CHLORIDE, MAGNESIUM CHLORIDE, SODIUM PHOSPHATE, DIBASIC, AND POTASSIUM PHOSPHATE: .53; .5; .37; .037; .03; .012; .00082 INJECTION, SOLUTION INTRAVENOUS at 11:03

## 2018-03-08 RX ADMIN — Medication 3 ML: at 06:03

## 2018-03-08 RX ADMIN — FUROSEMIDE 10 MG: 10 INJECTION, SOLUTION INTRAMUSCULAR; INTRAVENOUS at 05:03

## 2018-03-08 RX ADMIN — Medication 2 G: at 03:03

## 2018-03-08 RX ADMIN — PROPOFOL 30 MCG/KG/MIN: 10 INJECTION, EMULSION INTRAVENOUS at 05:03

## 2018-03-08 RX ADMIN — PROTAMINE SULFATE 20 MG: 10 INJECTION, SOLUTION INTRAVENOUS at 12:03

## 2018-03-08 RX ADMIN — NOREPINEPHRINE BITARTRATE 0.04 MCG/KG/MIN: 1 INJECTION, SOLUTION, CONCENTRATE INTRAVENOUS at 12:03

## 2018-03-08 RX ADMIN — FENTANYL CITRATE 250 MCG: 50 INJECTION, SOLUTION INTRAMUSCULAR; INTRAVENOUS at 07:03

## 2018-03-08 RX ADMIN — SUCCINYLCHOLINE CHLORIDE 120 MG: 20 INJECTION, SOLUTION INTRAMUSCULAR; INTRAVENOUS at 07:03

## 2018-03-08 NOTE — CONSULTS
[facility]  Endocrinology  Diabetes Consult Note    Consult Requested by: Yg Kaufman MD   Reason for admit: Acute decompensated heart failure    HISTORY OF PRESENT ILLNESS:  Reason for Consult: Management of Hyperglycemia     Surgical Procedure and Date: 3/8/18 - LVAD placement    Diabetes diagnosis year: none    Home Diabetes Medications:  none      HPI:   Patient is a 52 y.o. male with a diagnosis of CHF (ICD placement with multiple interrogations) s/p LVAD 3/8/18.  Patient is intubated and sedated, but per chart review he does not have history of DM nor were any antihyperglycemic medications on his MAR.       Endocrinology has been consulted to assist in post op hyperglycemia.        Interval HPI:   Overnight events:  AF - on epi, norepi, and nicardipine drip.    Remains intubated and sedated, with open chest.  On intensive insulin protocol with rate of 1u/h.  Most recent .    PMH, PSH, FH, SH updated and reviewed       Review of Systems  Unable to obtain due to: Sedation,Intubation,Critical Illness    Current Medications and/or Treatments Impacting Glycemic Control  Immunotherapy:    Immunosuppressants     None        Steroids:   Hormones     None        Pressors:    Autonomic Drugs     Start     Stop Route Frequency Ordered    03/08/18 1445  EPINEPHrine (ADRENALIN) 4 mg in sodium chloride 0.9% 250 mL infusion     Question Answer Comment   Titrate by: (in mcg/kg/min) 0.01    Titrate interval: (in minutes) 5    Titrate to maintain: (SBP or MAP or Cardiac Index) MAP    Greater than: (in mmHg) 60    Maximum dose: (in mcg/kg/min) 2        -- IV Continuous 03/08/18 1347    03/08/18 1445  norepinephrine 4 mg in dextrose 5% 250 mL infusion (premix) (titrating)     Question Answer Comment   Titrate by: (in mcg/kg/min) 0.01    Titrate interval: (in minutes) 5    Titrate to maintain: (MAP or SBP) MAP    Greater than: (in mmHg) 60    Maximum dose: (in mcg/kg/min) 3        -- IV Continuous  03/08/18 1347        Hyperglycemia/Diabetes Medications:   Antihyperglycemics     Start     Stop Route Frequency Ordered    03/08/18 1445  insulin regular (Humulin R) 100 Units in sodium chloride 0.9% 100 mL infusion      -- IV Continuous 03/08/18 1347             PHYSICAL EXAMINATION:  Vitals:    03/08/18 1505   BP:    Pulse: 107   Resp:    Temp:      Body mass index is 36.07 kg/m².    Physical Exam   Constitutional: He appears well-developed.   Critical    HENT:   Right Ear: External ear normal.   Left Ear: External ear normal.   Nose: Nose normal.   Intubated   Neck: No tracheal deviation present.   Unable to perform thyroid exam   Cardiovascular: Normal rate and intact distal pulses.    Pulmonary/Chest: Effort normal. He has no wheezes.   Ventilatory breath sounds   Abdominal: Soft. There is no tenderness. No hernia.   Musculoskeletal: He exhibits no edema.   Feet no cuts or  scratches  Shoes appropriate   Neurological: He has normal reflexes. No cranial nerve deficit.   Sedated   Skin: No rash noted.   No nodules   Psychiatric: He has a normal mood and affect. Judgment normal.   Vitals reviewed.        Labs Reviewed and Include     Recent Labs  Lab 03/08/18  0212 03/08/18  1349   * 190*   CALCIUM 10.0 9.4   ALBUMIN 3.6  --    PROT 7.7  --    * 137   K 3.3* 3.0*   CO2 29 25   CL 92* 98   BUN 18 21*   CREATININE 1.8* 1.9*   ALKPHOS 87  --    ALT 27  --    AST 33  --    BILITOT 1.2*  --      Lab Results   Component Value Date    WBC 25.45 (H) 03/08/2018    HGB 10.6 (L) 03/08/2018    HCT 35 (L) 03/08/2018    MCV 85 03/08/2018     03/08/2018     No results for input(s): TSH, FREET4 in the last 168 hours.  Lab Results   Component Value Date    HGBA1C 5.4 01/23/2018       Nutritional status:   Body mass index is 36.07 kg/m².  Lab Results   Component Value Date    ALBUMIN 3.6 03/08/2018    ALBUMIN 3.4 (L) 03/07/2018    ALBUMIN 3.4 (L) 03/07/2018     Lab Results   Component Value Date    PREALBUMIN  29 01/23/2018       Estimated Creatinine Clearance: 63.4 mL/min (A) (based on SCr of 1.9 mg/dL (H)).    Accu-Checks  Recent Labs      03/08/18   1357  03/08/18   1454   POCTGLUCOSE  178*  163*        ASSESSMENT and PLAN    Hyperglycemia    BG goal 140-180 while inpatient  Last a1c 5.4% (1/2018)    Patient critically ill.  Remains on intensive insulin at this time.  Patient chest is still open.    Will continue intensive protocol until patient more stable.    Patient does not have history of DM.  Will likely step down quickly once clinically more stable.          LVAD (left ventricular assist device) present      SP LVAD placement secondary to progressive CHF  Monitoring post op hyperglycemia.        Chronic systolic heart failure    S/p LVAD placement  Managed per primary    Avoid hypoglycemia              Plan discussed with patient, family, and RN at bedside.     Magalie Verma MD  Endocrinology  [facility], Carmencita Jimenes MD,  have personally taken the history and examined the patient and agree with the resident's note as stated above.

## 2018-03-08 NOTE — ASSESSMENT & PLAN NOTE
IABP and inotrope dependent acute decompensated heart failure.  INTERMACS 2.  HTS and CTS teams agree with LVAD placement.  Tbili is decreased today.  SCr is holding stable.  Consents signed.

## 2018-03-08 NOTE — ANESTHESIA PREPROCEDURE EVALUATION
[facility]  Anesthesia Pre-Operative Evaluation         Patient Name: Tim Richards  YOB: 1965  MRN: [id number]    SUBJECTIVE:     Pre-operative evaluation for Procedure(s) (LRB):  CLOSURE-STERNAL WOUND (N/A)  Insertion-Right Ventricular Assist Device (N/A)     03/08/2018    Tim Richards is a 52 y.o. male w/ a significant PMHx of difficult intubation (see prev airway documentation), CHF (EF 10%, PASP of 47), MI, HTN, CKD, s/p CRT and ICD for VT, and EtOH use. He was admitted from clinic on 3/1 with decompensated heart failure with plan for accelerated workup for LVAD/ heart transplant. He had IABP placed on 03/06/18 and an LVAD placed on 03/08/18.    Patient now presents for the above procedure(s).      LDA:       Pulmonary Artery Catheter Assessment  03/08/18 0726 (Active)   Number of days: 0            Peripheral IV - Single Lumen 03/07/18 1130 Right Forearm (Active)   Site Assessment Clean;Dry;Intact 3/8/2018  3:00 AM   Line Status Flushed 3/8/2018  3:00 AM   Dressing Status Clean;Dry;Intact 3/8/2018  3:00 AM   Dressing Intervention New dressing 3/7/2018 11:26 AM   Dressing Change Due 03/11/18 3/8/2018  3:00 AM   Site Change Due 03/11/18 3/7/2018  7:01 PM   Reason Not Rotated Not due 3/8/2018  3:00 AM   Number of days: 0            Peripheral IV - Single Lumen 03/08/18 0739 Left Forearm (Active)   Number of days: 0            Midline Catheter Insertion/Assessment  - Single Lumen 03/07/18 1130 Right cephalic vein (lateral side of arm) 18g x 8cm (Active)   Site Assessment Clean;Dry;Intact;No redness;No swelling 3/8/2018  3:00 AM   IV Device Securement catheter securement device 3/8/2018  3:00 AM   Line Status Flushed;Infusing 3/8/2018  3:00 AM   Dressing Status Biopatch in place 3/8/2018  3:00 AM   Dressing Intervention New dressing 3/7/2018 11:26 AM   Dressing Change Due 03/14/18 3/8/2018  3:00 AM   Site Change Due 04/05/18 3/7/2018  7:01 PM   Reason Not Rotated Not due  3/8/2018  3:00 AM   Number of days: 0            Arterial Line 03/08/18 0721 Left Radial (Active)   Number of days: 0            Urethral Catheter 03/08/18 0736 Temperature probe;Straight-tip;Non-latex 16 Fr. (Active)   Number of days: 0       Prev airway:   Present Prior to Hospital Arrival?: Yes; Placement Date: 03/08/18; Placement Time: 0824 (created via procedure documentation); Method of Intubation: Fiberoptic Intubation (Placed Airq LMA prior to fiberoptic intubation. Confirmed ventilation. Then placed 8.0 ETT w/ fiberoptic scope through AirQ without difficulty. ); Inserted by: Anesthesia Resident; Airway Device: Endotracheal Tube; Mask Ventilation: Other; Intubated: Postinduction; Airway Device Size: 8.0; Style: Cuffed; Cuff Inflation: Minimal occlusive pressure; Placement Verified By: Fiberoptic bronchoscopic inspection, Capnometry, Auscultation, ETT Condensation; Grade: Grade I; Complicating Factors: Anterior larynx; Intubation Findings: Positive EtCO2, Bilateral breath sounds, Atraumatic/Condition of teeth unchanged;  Depth of Insertion: 25.5; Securment: Lips; Complications: None; Breath Sounds: Equal Bilateral; Insertion Attempts: 1.    Placement Date: 10/31/14; Placement Time: 1205; Method of Intubation: Direct laryngoscopy, Glidescope; Inserted by: Anesthesia MD; Airway Device: Endotracheal Tube; Mask Ventilation: Mod Diff - oral; Intubated: Postinduction; Blade: Barrios #2; Airway Device Size: 7.5; Style: Cuffed; Cuff Inflation: Minimal occlusive pressure; Inflation Amount: 5.5; Placement Verified By: Auscultation, Capnometry, Fiberoptic bronchoscopic inspection; Grade: Grade IV; Complicating Factors: Large/Floppy epiglottis, Anterior larynx; Intubation Findings: Positive EtCO2, Bilateral breath sounds, Atraumatic/Condition of teeth unchanged;  Depth of Insertion: 23; Securment: Lips; Complications: Desaturation, Other (difficult intubation); Breath Sounds: Equal Bilateral; Insertion Attempts: 3 (enter  comment) (2nd 3rd attempts with glidescope poor views with 2nd attempt with glidescope); Removal Date: 10/31/14;  Removal Time: 1401    Drips: None documented.      Patient Active Problem List   Diagnosis    Chronic systolic heart failure    Cigarette nicotine dependence without complication    Alcohol abuse    Pulmonary nodule seen on imaging study    Biventricular implantable cardioverter-defibrillator in situ    Respiratory distress    Acute on chronic combined systolic and diastolic heart failure    Fluid overload    Syncope    Elevated troponin    PVT (paroxysmal ventricular tachycardia)    High risk medications (amiodarone) long-term use    Dilated cardiomyopathy    Acute decompensated heart failure    CKD (chronic kidney disease), stage III    Hypertensive cardiovascular-renal disease, stage 1-4 or unspecified chronic kidney disease, with heart failure    Organ transplant candidate    Screening for colon cancer    Palliative care encounter    Goals of care, counseling/discussion    Cardiogenic shock       Review of patient's allergies indicates:   Allergen Reactions    Lisinopril Anaphylaxis       Current Inpatient Medications:      Current Facility-Administered Medications on File Prior to Encounter   Medication Dose Route Frequency Provider Last Rate Last Dose    allopurinol tablet 100 mg  100 mg Oral TID YANET Aguilar   100 mg at 03/07/18 2127    amiodarone tablet 200 mg  200 mg Oral Daily YANET Aguilar   200 mg at 03/07/18 0800    aspirin EC tablet 81 mg  81 mg Oral Daily YANET Aguilar   81 mg at 03/07/18 0800    ceFEPIme injection 1 g  1 g Intravenous On Call Procedure Vidhi Nicolas MD   1 g at 03/08/18 0830    cyclobenzaprine tablet 5 mg  5 mg Oral Once Ivette Gustafson PA-C        DOBUTamine 500mg in D5W 250mL infusion (premix) (NON-TITRATING)  5 mcg/kg/min Intravenous Continuous Zaid Cuadra MD 18 mL/hr at 03/08/18 0708 5  mcg/kg/min at 03/08/18 0708    docusate sodium capsule 100 mg  100 mg Oral TID Amparo Lopez MD   100 mg at 03/07/18 2127    electrolyte-S (ISOLYTE)    Continuous PRN Shamir Celis MD        EPINEPHrine (ADRENALIN) 4 mg infusion   Intravenous Continuous PRN Shamir Celis MD   0.04 mcg/kg/min at 03/08/18 0745    EPINEPHrine injection   Intravenous PRN Shamir Celis MD   8 mcg at 03/08/18 0729    etomidate injection   Intravenous PRN Shamir Celis MD   12 mg at 03/08/18 0728    fentaNYL injection    PRN Shamir Celis MD   100 mcg at 03/08/18 0844    fluconazole (DIFLUCAN) IVPB 400 mg 200 mL  400 mg Intravenous On Call Procedure Vidhi Nicolas MD        fluconazole (DIFLUCAN) IVPB 400 mg 200 mL  400 mg Intravenous Q24H Yg Kaufman MD        fluconazole (DIFLUCAN) IVPB 400 mg 200 mL  400 mg Intravenous Q24H Yg Kaufman MD   400 mg at 03/08/18 0830    furosemide (LASIX) 500 mg in sodium chloride 0.9% 100 mL infusion  15 mg/hr Intravenous Continuous Ivette Gustafson PA-C 3 mL/hr at 03/08/18 0500 15 mg/hr at 03/08/18 0500    heparin (porcine) injection 5,000 Units  5,000 Units Subcutaneous Q12H Amparo Lopez MD   5,000 Units at 03/07/18 2127    lidocaine (cardiac) injection   Intravenous PRN Shamir Celis MD   100 mg at 03/08/18 0728    magnesium oxide tablet 800 mg  800 mg Oral BID Ivette Gustafson PA-C   800 mg at 03/07/18 2127    midazolam injection   Intravenous PRN Shamir Celis MD   2 mg at 03/08/18 0725    rocuronium injection    PRN Shamir Celis MD   20 mg at 03/08/18 0947    sodium chloride 0.9% flush 3 mL  3 mL Intravenous Q8H YANET Aguilar   3 mL at 03/08/18 0603    succinylcholine injection   Intravenous PRN Shamir Celis MD   120 mg at 03/08/18 0730    traMADol tablet 50 mg  50 mg Oral Q6H PRN YANET Aguilar   50 mg at 03/07/18  1354    tranexamic acid infusion   Intravenous Continuous PRN Shamir Celis MD 62 mL/hr at 03/08/18 0708 1 mg/kg/hr at 03/08/18 0708    tranexamic acid injection Soln    PRN Ed Marroquin Jr., MD   1,250 mg at 03/08/18 0755    vancomycin 750 mg in normal saline 250 mL IVPB (ready to mix system)  750 mg Intravenous On Call Procedure Vidhi Nicolas MD   750 mg at 03/08/18 0830     Current Outpatient Prescriptions on File Prior to Encounter   Medication Sig Dispense Refill    allopurinol (ZYLOPRIM) 100 MG tablet TAKE 1 TABLET (100 MG TOTAL) BY MOUTH 3 (THREE) TIMES DAILY. 90 tablet 1    amiodarone (PACERONE) 200 MG Tab TAKE 1 TABLET (200 MG TOTAL) BY MOUTH ONCE DAILY. 30 tablet 7    aspirin (ECOTRIN) 81 MG EC tablet Take 81 mg by mouth. 1 Tablet, Delayed Release (E.C.) Oral Every day      bumetanide (BUMEX) 2 MG tablet Take 1 tablet (2 mg total) by mouth 2 (two) times daily. 120 tablet 2    ERGOCALCIFEROL, VITAMIN D2, (VITAMIN D ORAL) Take by mouth once daily.      losartan (COZAAR) 25 MG tablet Take 25 mg by mouth once daily.  1    metOLazone (ZAROXOLYN) 2.5 MG tablet Take 2.5 mg by mouth every other day.  3    metoprolol succinate (TOPROL-XL) 25 MG 24 hr tablet Take 0.5 tablets (12.5 mg total) by mouth once daily. 30 tablet 2    potassium chloride SA (K-DUR,KLOR-CON) 10 MEQ tablet Take 2 tablets (20 mEq total) by mouth 2 (two) times daily. 240 tablet 2    spironolactone (ALDACTONE) 25 MG tablet Take 1 tablet (25 mg total) by mouth once daily. 30 tablet 0    valsartan (DIOVAN) 80 MG tablet Take 0.5 tablets (40 mg total) by mouth 2 (two) times daily. 30 tablet 1    VITAMIN E ACETATE (VITAMIN E ORAL) Take by mouth once daily.         Past Surgical History:   Procedure Laterality Date    CARDIAC CATHETERIZATION  Dec. 2012    CARDIAC DEFIBRILLATOR PLACEMENT Left     CRRT-D    COLONOSCOPY N/A 3/6/2018    Procedure: COLONOSCOPY;  Surgeon: Alonso Bone MD;  Location: [address]  [facility]);  Service: Endoscopy;  Laterality: N/A;       Social History     Social History    Marital status:      Spouse name: N/A    Number of children: N/A    Years of education: N/A     Occupational History     [employer]      Social History Main Topics    Smoking status: Former Smoker     Packs/day: 1.00     Years: 31.00     Types: Cigarettes     Quit date: 1/12/2018    Smokeless tobacco: Never Used      Comment: pt is quiting on his own - pt stated not qualified for program; 2/16 pt  quit on his own    Alcohol use No      Comment: one fifth of gin or rum/week    Drug use: No    Sexual activity: Yes     Partners: Female     Birth control/ protection: None      Comment: 10/5/17  with same partner 7 years      Other Topics Concern    Not on file     Social History Narrative    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8   --   2.1   CALCIUM  9.7  9.5  10.0   ALBUMIN  3.4*  3.4*  3.6   PROT  7.0  7.0  7.7   ALKPHOS  81  85  87   ALT  18  19  27   AST  27  28  33   BILITOT  1.4*  1.2*  1.2*       INR:  Recent Labs      03/08/18 0212   INR  1.0       Diagnostic Studies: No relevant studies.    EKG: No recent studies available.    2D ECHO:  Results for orders placed or performed during the hospital encounter of 03/01/18   2D echo with color flow doppler   Result Value Ref Range    EF 8 (A) 55 - 65    Mitral  Valve Regurgitation MODERATE TO SEVERE (A)     Est. PA Systolic Pressure 38.28     Tricuspid Valve Regurgitation MILD      CONCLUSIONS     1 - Eccentric LVH with severely depressed left ventricular systolic function (EF <10%).     2 - Severe left atrial enlargement.     3 - Mildly to moderately depressed right ventricular systolic function .     4 - Moderate to severe mitral regurgitation.     5 - Mild tricuspid regurgitation.     6 - The estimated PA systolic pressure is 38 mmHg.     7 - Increased LAP and increased LVEDP.     8 - There is pulsus noted in the LVOT Doppler Stroke volume .     ASSESSMENT/PLAN:     Anesthesia Evaluation    I have reviewed the Patient Summary Reports.    I have reviewed the Nursing Notes.   I have reviewed the Medications.     Review of Systems  Anesthesia Hx:  Hx of Anesthetic complications  History of prior surgery of interest to airway management or planning: Previous anesthesia: General Airway issues documented on chart review include mask, difficult, difficult direct laryngoscopy, required fiberoptic intubation  Denies Family Hx of Anesthesia complications.  Personal Hx of Anesthesia complications  Difficult Intubation   Social:  Smoker    Hematology/Oncology:        Denies Current/Recent Cancer   EENT/Dental:   denies chronic allergic rhinitis   Cardiovascular:   Exercise tolerance: poor Pacemaker Hypertension Valvular problems/Murmurs (mild MR), MR Past MI Denies CAD.    Denies CABG/stent.  Denies Dysrhythmias.  CHF ECG has been reviewed.    Pulmonary:   Denies Pneumonia Denies Asthma.  Denies Shortness of breath.  Denies Recent URI. Pulmonary nodule   Renal/:   Chronic Renal Disease (creatinine=1.3 (baseline)), CRI    Hepatic/GI:  Hepatic/GI Normal Denies PUD.  Denies Hiatal Hernia.  Denies GERD.    Neurological:  Neurology Normal Denies TIA.  Denies CVA. Denies Seizures.    Endocrine:  Endocrine Normal Denies Diabetes.    Psych:   Denies Psychiatric History.          Physical  Exam  General:  Well nourished    Airway/Jaw/Neck:  Airway Findings: Mouth Opening: Normal Tongue: Normal  Pre-Existing Airway Tube(s): Oral Endotracheal tube  General Airway Assessment: Adult  Jaw/Neck Findings:  Neck ROM: Normal ROM  Neck Findings: Normal M2-3, good airway opening, no loose dentition per pt, FROM, short thick neck    Dental:  Dental Findings: In tact, Periodontal disease, Mild   Chest/Lungs:  Chest/Lungs Findings: Clear to auscultation, Normal Respiratory Rate     Heart/Vascular:  Heart Findings: Rate: Normal  Rhythm: Regular Rhythm  Vascular Findings: Normal    Abdomen:  Abdomen Findings: Normal    Musculoskeletal:  Musculoskeletal Findings: Normal   Skin:  Skin Findings: Normal    Mental Status:  Mental Status Findings:  Unconscious         Anesthesia Plan  Type of Anesthesia, risks & benefits discussed:  Anesthesia Type:  general  Patient's Preference:   Intra-op Monitoring Plan: standard ASA monitors, arterial line and central line  Intra-op Monitoring Plan Comments:   Post Op Pain Control Plan: per primary service following discharge from PACU  Post Op Pain Control Plan Comments:   Induction:   IV  Beta Blocker:  Patient is not currently on a Beta-Blocker (No further documentation required).       Informed Consent: Patient understands risks and agrees with Anesthesia plan.  Questions answered. Anesthesia consent signed with patient.  ASA Score: 4     Day of Surgery Review of History & Physical:    H&P update referred to the surgeon.         Ready For Surgery From Anesthesia Perspective.

## 2018-03-08 NOTE — H&P
[facility]  Critical Care - Surgery  History & Physical    Patient Name: Tim Richards  MRN: [id number]  Admission Date: 3/1/2018  Code Status: Full Code  Attending Physician: Yg Kaufman MD   Primary Care Provider: Ja Méndez MD   Principal Problem: Acute decompensated heart failure    Subjective:     HPI:  This is a 52 year-old gentleman with history of systolic and diastolic heart failure, CKD 3, VT s/p ICD placement, who is now admitted for acute decompensated heart failure.  He has a balloon pump in place.  He has gone through the advanced options workup.   Patient is s/p Heartmate 2 LVAD placement on 3/8/18. Arrived to SICU post-op intubated and sedated.     Hospital/ICU Course:  No notes on file    Follow-up For: Procedure(s) (LRB):  Insertion-Left Ventricular Assist Device (N/A)    Post-Operative Day: Day of Surgery     Past Medical History:   Diagnosis Date    CHF (congestive heart failure)     Dilated cardiomyopathy 1/10/2018    Disorder of kidney and ureter     CKD    Gout     HTN (hypertension)     Ventricular tachycardia (paroxysmal)        Past Surgical History:   Procedure Laterality Date    CARDIAC CATHETERIZATION  Dec. 2012    CARDIAC DEFIBRILLATOR PLACEMENT Left     CRRT-D    COLONOSCOPY N/A 3/6/2018    Procedure: COLONOSCOPY;  Surgeon: Alonso Bone MD;  Location: [facility] ([address] [place] [address]);  Service: Endoscopy;  Laterality: N/A;       Review of patient's allergies indicates:   Allergen Reactions    Aspirin Other (See Comments)     Prevent II patient- Do NOT order aspirin. Please call Yenny [phone] if patient is admitted to hospital    Lisinopril Anaphylaxis       Family History     Problem Relation (Age of Onset)    Cancer Sister (54)    Coronary artery disease Father    Diabetes Father    Heart disease Father    Hypertension Father    No Known Problems Mother, Brother        Social History Main Topics    Smoking status: Former Smoker     Packs/day: 1.00      Years: 31.00     Types: Cigarettes     Quit date: 1/12/2018    Smokeless tobacco: Never Used      Comment: pt is quiting on his own - pt stated not qualified for program; 2/16 pt  quit on his own    Alcohol use No      Comment: one fifth of gin or rum/week    Drug use: No    Sexual activity: Yes     Partners: Female     Birth control/ protection: None      Comment: 10/5/17  with same partner 7 years       Review of Systems   Unable to perform ROS: Intubated     Objective:     Vital Signs (Most Recent):  Temp: 99 °F (37.2 °C) (03/08/18 1600)  Pulse: 98 (03/08/18 1600)  Resp: 16 (03/08/18 1600)  BP: 107/68 (03/08/18 1515)  SpO2: 100 % (03/08/18 1600) Vital Signs (24h Range):  Temp:  [97.9 °F (36.6 °C)-99.5 °F (37.5 °C)] 99 °F (37.2 °C)  Pulse:  [] 98  Resp:  [14-24] 16  SpO2:  [95 %-100 %] 100 %  BP: (107)/(68) 107/68  Arterial Line BP: ()/(33-55) 103/51     Weight: 124 kg (273 lb 5.9 oz)  Body mass index is 36.07 kg/m².      Intake/Output Summary (Last 24 hours) at 03/08/18 1644  Last data filed at 03/08/18 1610   Gross per 24 hour   Intake             4059 ml   Output             5060 ml   Net            -1001 ml       Physical Exam   Constitutional: He appears well-developed and well-nourished.   HENT:   Head: Normocephalic and atraumatic.   ETT, OGT in place   Eyes: EOM are normal. Pupils are equal, round, and reactive to light.   Neck: Neck supple.   Cardiovascular:   Mechanical hum   Pulmonary/Chest: Breath sounds normal. No respiratory distress. He has no wheezes.   Intubated. Mediastinal chest tubes x2 in place. SS output   Abdominal: Soft. He exhibits no distension. There is no tenderness.   Genitourinary:   Genitourinary Comments: Lagunas in place   Neurological:   Intubated, sedated     Vents:  Vent Mode: SIMV (03/08/18 1505)  Ventilator Initiated: Yes (03/08/18 1400)  Set Rate: 16 bmp (03/08/18 1505)  Vt Set: 500 mL (03/08/18 1505)  Pressure Support: 10 cmH20 (03/08/18  1505)  PEEP/CPAP: 5 cmH20 (03/08/18 1505)  Oxygen Concentration (%): 50 (03/08/18 1600)  Peak Airway Pressure: 29 cmH2O (03/08/18 1505)  Plateau Pressure: 0 cmH20 (03/08/18 1505)  Total Ve: 8.05 mL (03/08/18 1505)  F/VT Ratio<105 (RSBI): (!) 36.48 (03/08/18 1400)    Lines/Drains/Airways     Central Venous Catheter Line                 Percutaneous Central Line Insertion/Assessment - triple lumen  03/08/18 0726 right internal jugular less than 1 day         Pulmonary Artery Catheter Assessment  03/08/18 0726 less than 1 day          Drain                 Chest Tube 03/08/18 1125 Mediastinal 32 Fr. less than 1 day         Chest Tube 03/08/18 1125 Mediastinal 32 Fr. less than 1 day         NG/OG Tube 03/08/18 0731 [place] sump 14 Fr. Right mouth less than 1 day         Urethral Catheter 03/08/18 0736 Temperature probe;Straight-tip;Non-latex 16 Fr. less than 1 day          Airway                 Airway - Non-Surgical 03/08/18 0824 Endotracheal Tube less than 1 day          Arterial Line                 Arterial Line 03/08/18 0721 Left Radial less than 1 day          Line                 VAD 03/08/18 1124 Left ventricular assist device HeartMate II less than 1 day          Peripheral Intravenous Line                 Midline Catheter Insertion/Assessment  - Single Lumen 03/07/18 1130 Right cephalic vein (lateral side of arm) 18g x 8cm 1 day         Peripheral IV - Single Lumen 03/07/18 1130 Right Forearm 1 day         Peripheral IV - Single Lumen 03/08/18 0739 Left Forearm less than 1 day                Significant Labs:    CBC/Anemia Profile:    Recent Labs  Lab 03/07/18  0258 03/08/18  0212  03/08/18  1349 03/08/18  1359 03/08/18  1459   WBC 6.19 6.66  --  25.45*  --   --    HGB 11.2* 11.4*  --  10.6*  --   --    HCT 35.8* 35.9*  < > 33.2* 34* 35*    174  --  155  --   --    MCV 87 85  --  85  --   --    RDW 15.6* 15.2*  --  15.1*  --   --    < > = values in this interval not displayed.     Chemistries:    Recent  Labs  Lab 03/07/18  0258 03/07/18  1524 03/08/18  0212 03/08/18  1349    136 133* 137   K 3.8 3.6 3.3* 3.0*    99 92* 98   CO2 24 26 29 25   BUN 16 16 18 21*   CREATININE 1.6* 1.6* 1.8* 1.9*   CALCIUM 9.7 9.5 10.0 9.4   ALBUMIN 3.4* 3.4* 3.6  --    PROT 7.0 7.0 7.7  --    BILITOT 1.4* 1.2* 1.2*  --    ALKPHOS 81 85 87  --    ALT 18 19 27  --    AST 27 28 33  --    MG 1.8  --  2.1 1.7   PHOS  --   --   --  3.4       Recent Lab Results       03/08/18  1606 03/08/18  1602 03/08/18  1459 03/08/18  1459 03/08/18  1454      Immature Granulocytes          Immature Grans (Abs)          Albumin          Alkaline Phosphatase          Allens Test  N/A N/A N/A      ALT          Anion Gap          aPTT          AST          Baso #          Basophil%          Total Bilirubin          Site  Nataly/UAC Nataly/UAC Nataly/UAC      BUN, Bld          Calcium          Chloride          CO2          Creatinine          DelSys  Adult Vent Adult Vent Adult Vent      Differential Method          eGFR if           eGFR if non           Eos #          Eosinophil%          FiO2  50 50 50      Flow  60 60 60      Glucose          Gran # (ANC)          Gran%          Hematocrit          Hemoglobin          Coumadin Monitoring INR          LD          Lymph #          Lymph%          Magnesium          MCH          MCHC          MCV          Mode  SIMV SIMV SIMV      Mono #          Mono%          MPV          nRBC          PEEP  5 5 5      Phosphorus          PiP  27 28 30      Platelets          POC BE  5 3 2      POC Glucose          POC HCO3  30.5(H) 28.2(H) 27.0      POC Hematocrit    35(L)      POC Ionized Calcium    1.15      POC Lactate  4.60(HH) 4.57(HH)       POC PCO2  50.4(H) 45.0 44.7      POC PH  7.389 7.405 7.389      POC PO2  173(H) 72(L) 68(L)      POC Potassium    3.0(L)      POC SATURATED O2  100 94(L) 93(L)      POC Sodium    137      POC TCO2  32(H) 30(H) 28(H)      POCT Glucose 173(H)     163(H)     Potassium          Total Protein          Protime          PS  10 10 10      Rate  16 16 16      RBC          RDW          Sample  ARTERIAL ARTERIAL ARTERIAL      Sodium          Sp02  100 100 99      Vt  500 500 500      WBC                      03/08/18  1414 03/08/18  1400 03/08/18  1359 03/08/18  1357 03/08/18  1349      Immature Granulocytes     0.9(H)     Immature Grans (Abs)     0.23  Comment:  Mild elevation in immature granulocytes is non specific and   can be seen in a variety of conditions including stress response,   acute inflammation, trauma and pregnancy. Correlation with other   laboratory and clinical findings is essential.  (H)     Albumin          Alkaline Phosphatase          Allens Test N/A N/A N/A       ALT          Anion Gap     14     aPTT     23.3  Comment:  aPTT therapeutic range = 39-69 seconds     AST          Baso #     0.04     Basophil%     0.2     Total Bilirubin          Site Other Nataly/UAC Nataly/UAC       BUN, Bld     21(H)     Calcium     9.4     Chloride     98     CO2     25     Creatinine     1.9(H)     DelSys  Adult Vent Adult Vent       Differential Method     Automated     eGFR if      46.2(A)     eGFR if non      39.9  Comment:  Calculation used to obtain the estimated glomerular filtration  rate (eGFR) is the CKD-EPI equation.   (A)     Eos #     0.1     Eosinophil%     0.2     FiO2  100 100       Flow  60 60       Glucose     190(H)     Gran # (ANC)     22.4(H)     Gran%     88.0(H)     Hematocrit     33.2(L)     Hemoglobin     10.6(L)     Coumadin Monitoring INR     1.2  Comment:  Coumadin Therapy:  2.0 - 3.0 for INR for all indicators except mechanical heart valves  and antiphospholipid syndromes which should use 2.5 - 3.5.       LD          Lymph #     1.2     Lymph%     4.6(L)     Magnesium     1.7     MCH     27.1     MCHC     31.9(L)     MCV     85     Mode  SIMV SIMV       Mono #     1.5(H)     Mono%     6.1     MPV      11.5     nRBC     0     PEEP  5 5       Phosphorus     3.4     PiP  31 28       Platelets     155     POC BE 5 7 6       POC Glucose          POC HCO3 30.2(H) 31.5(H) 30.6(H)       POC Hematocrit   34(L)       POC Ionized Calcium   1.17       POC Lactate  4.18(HH)        POC PCO2 51.3(H) 48.1(H) 48.0(H)       POC PH 7.378 7.424 7.412       POC PO2 36(LL) 211(H) 205(H)       POC Potassium   2.8(LL)       POC SATURATED O2 67(L) 100 100       POC Sodium   135(L)       POC TCO2 32(H) 33(H) 32(H)       POCT Glucose    178(H)      Potassium     3.0(L)     Total Protein          Protime     12.1     PS  10 10       Rate  16 16       RBC     3.91(L)     RDW     15.1(H)     Sample VENOUS ARTERIAL ARTERIAL       Sodium     137     Sp02  100 100       Vt  500 500       WBC     25.45(H)                 03/08/18  1220 03/08/18  1156 03/08/18  1128 03/08/18  1100 03/08/18  1034      Immature Granulocytes          Immature Grans (Abs)          Albumin          Alkaline Phosphatase          Allens Test          ALT          Anion Gap          aPTT          AST          Baso #          Basophil%          Total Bilirubin          Site          BUN, Bld          Calcium          Chloride          CO2          Creatinine          DelSys          Differential Method          eGFR if           eGFR if non           Eos #          Eosinophil%          FiO2 1 1 0.8 80 0.9     Flow          Glucose          Gran # (ANC)          Gran%          Hematocrit          Hemoglobin          Coumadin Monitoring INR          LD          Lymph #          Lymph%          Magnesium          MCH          MCHC          MCV          Mode          Mono #          Mono%          MPV          nRBC          PEEP          Phosphorus          PiP          Platelets          POC BE 9 9 11 9 13     POC Glucose 156(H) 169(H) 184(H) 189(H) 192(H)     POC HCO3 33.1(H) 32.5(H) 34.6(H) 32.9(H) 34.3(H)     POC Hematocrit 33(L) 34(L) 37  34(L) 33(L)     POC Ionized Calcium 1.12 1.13 1.12 1.09 1.03(L)     POC Lactate          POC PCO2 45.0 43.9 48.3(H) 46.4(H) 35.4     POC PH 7.474(H) 7.477(H) 7.463(H) 7.458(H) 7.595(HH)     POC PO2 35(LL) 36(LL) 34(LL) 54 400(H)     POC Potassium 3.4(L) 3.5 3.7 3.0(L) 2.8(LL)     POC SATURATED O2 71(L) 73(L) 68(L) 89(L) 100     POC Sodium 134(L) 134(L) 133(L) 133(L) 133(L)     POC TCO2 34(H) 34(H) 36(H) 34(H) 35(H)     POCT Glucose          Potassium          Total Protein          Protime          PS          Rate          RBC          RDW          Sample VENOUS VENOUS VENOUS VENOUS ARTERIAL     Sodium          Sp02          Vt          WBC                      03/08/18  0212      Immature Granulocytes 0.2     Immature Grans (Abs) 0.01  Comment:  Mild elevation in immature granulocytes is non specific and   can be seen in a variety of conditions including stress response,   acute inflammation, trauma and pregnancy. Correlation with other   laboratory and clinical findings is essential.       Albumin 3.6     Alkaline Phosphatase 87     Allens Test      ALT 27     Anion Gap 12     aPTT      AST 33     Baso # 0.01     Basophil% 0.2     Total Bilirubin 1.2  Comment:  For infants and newborns, interpretation of results should be based  on gestational age, weight and in agreement with clinical  observations.  Premature Infant recommended reference ranges:  Up to 24 hours.............<8.0 mg/dL  Up to 48 hours............<12.0 mg/dL  3-5 days..................<15.0 mg/dL  6-29 days.................<15.0 mg/dL  (H)     Site      BUN, Bld 18     Calcium 10.0     Chloride 92(L)     CO2 29     Creatinine 1.8(H)     [employer]      Differential Method Automated     eGFR if  49.3(A)     eGFR if non  42.6  Comment:  Calculation used to obtain the estimated glomerular filtration  rate (eGFR) is the CKD-EPI equation.   (A)     Eos # 0.0     Eosinophil% 0.2     FiO2      Flow      Glucose 143(H)     Gran  # (ANC) 5.4     Gran% 81.3(H)     Hematocrit 35.9(L)     Hemoglobin 11.4(L)     Coumadin Monitoring INR 1.0  Comment:  Coumadin Therapy:  2.0 - 3.0 for INR for all indicators except mechanical heart valves  and antiphospholipid syndromes which should use 2.5 - 3.5.         Comment:  Results are increased in hemolyzed samples.     Lymph # 0.6(L)     Lymph% 8.9(L)     Magnesium 2.1     MCH 27.0     MCHC 31.8(L)     MCV 85     Mode      Mono # 0.6     Mono% 9.2     MPV 10.8     nRBC 0     PEEP      Phosphorus      PiP      Platelets 174     POC BE      POC Glucose      POC HCO3      POC Hematocrit      POC Ionized Calcium      POC Lactate      POC PCO2      POC PH      POC PO2      POC Potassium      POC SATURATED O2      POC Sodium      POC TCO2      POCT Glucose      Potassium 3.3(L)     Total Protein 7.7     Protime 10.8     PS      Rate      RBC 4.23(L)     RDW 15.2(H)     Sample      Sodium 133(L)     Sp02      Vt      WBC 6.66           Significant Imaging: I have reviewed all pertinent imaging results/findings within the past 24 hours.    Assessment/Plan:     Chronic systolic heart failure    Neuro:   - Intubated  - Sedated - Propofol     Pulmonary:   - Intubated  Vent Mode: SIMV  Oxygen Concentration (%):  [] 50  Resp Rate Total:  [15 br/min-24 br/min] 18 br/min  Vt Set:  [500 mL] 500 mL  PEEP/CPAP:  [5 cmH20] 5 cmH20  Pressure Support:  [10 cmH20] 10 cmH20  Mean Airway Pressure:  [9.6 ooC86-10 cmH20] 11 cmH20  - Scheduled duo-nebs  - Nitric. Wean per CTS     Cardiac:  - s/p LVAD 3/8/18  - Drips: Epi, Levo, Cardene  - Wean pressors per CTS     Renal:   - CKD; baseline Cr. 1.7  - Lagunas in place  - Monitor UOP     Infectious Disease:   - Afebrile, WBC normal  - Routine emilie-op abx  - Trend WBC     Hematology/Oncology:  - Post-op H/H stable  - Trend CBC     Endocrine:  - Insuline gtt  - Endocrine on board     Fluids/Electrolytes/Nutrition/GI:   - NPO, NGT  - Trend CMP  - Replace Lytes PRN     Pain  Management::   - PRN Fentanyl     Dispo:  Continue SICU care. CTS primary.              Critical care was time spent personally by me on the following activities: development of treatment plan with patient or surrogate and bedside caregivers, discussions with consultants, evaluation of patient's response to treatment, examination of patient, ordering and performing treatments and interventions, ordering and review of laboratory studies, ordering and review of radiographic studies, pulse oximetry, re-evaluation of patient's condition.  This critical care time did not overlap with that of any other provider or involve time for any procedures.     Rogers Tejada MD  Critical Care - Surgery  [facility]

## 2018-03-08 NOTE — BRIEF OP NOTE
[facility]  Brief Operative Note    SUMMARY     Surgery Date: 3/8/2018     Surgeon(s) and Role:     * Yg Kaufman MD - Primary     * Hossein Segal MD - Fellow    Pre-op Diagnosis:  Heart failure, unspecified heart failure chronicity, unspecified heart failure type [I50.9]    Post-op Diagnosis:  Post-Op Diagnosis Codes:     * Heart failure, unspecified heart failure chronicity, unspecified heart failure type [I50.9]    Procedure(s) (LRB):  Insertion-Left Ventricular Assist Device (N/A)    Anesthesia: General    Description of Procedure: Heartmate 2 LVAD.    Description of the findings of the procedure: Two mediastinal chest tubes.    Estimated Blood Loss: 100mL         Specimens:   Specimen (12h ago through future)    Start     Ordered    03/08/18 1035  Specimen to Pathology - Surgery  Once     Comments:  1) apex of the heart - perm              R/O non-compaction              ATTN:  Dr. Kaufman      03/08/18 1034        Hossein Segal MD  Thoracic Surgery Resident, PGY6  Cardiothoracic Surgery  [facility]

## 2018-03-08 NOTE — PROGRESS NOTES
Tim Richards was discussed at selection committee on 03/07/18. Patient presented for OHT and VAD. Declined for OHT due to:  Recent tobacco and alcohol     Patient has an anticipated survival benefit. Patient has NYHA IV symptoms which have failed to respond to optimal medical management.     Decision made to proceed with DT VAD implantation. This has been discussed with the patient and family, including the reasons for why he is not a transplant candidate at this time.     Patient has a continued need for IV inotropic therapy, and is on dobutamine at 5 mcg/kg/min. Patient has not had a CPX due to being on inotropes. Additionally patient is on IABP support.

## 2018-03-08 NOTE — ASSESSMENT & PLAN NOTE
BG goal 140-180 while inpatient  Last a1c 5.4% (1/2018)    Patient critically ill.  Remains on intensive insulin at this time.  Patient chest is still open.    Will continue intensive protocol until patient more stable.    Patient does not have history of DM.  Will likely step down quickly once clinically more stable.

## 2018-03-08 NOTE — ANESTHESIA PROCEDURE NOTES
Arterial    Diagnosis: CHF    Patient location during procedure: done in OR  Procedure start time: 3/8/2018 7:21 AM  Timeout: 3/8/2018 7:20 AM  Procedure end time: 3/8/2018 7:25 AM  Staffing  Anesthesiologist: KAITLIN ISAAC JR  Resident/CRNA: ORALIA KAPOOR  Performed: resident/CRNA   Anesthesiologist was present at the time of the procedure.  Preanesthetic Checklist  Completed: patient identified, site marked, surgical consent, pre-op evaluation, timeout performed, IV checked, risks and benefits discussed, monitors and equipment checked and anesthesia consent givenArterial  Skin Prep: chlorhexidine gluconate  Local Infiltration: none and lidocaine  Orientation: left  Location: radial  Catheter Size: 18 G  Catheter placement by Ultrasound guidance. Heme positive aspiration all ports.  Vessel Caliber: small, patent, compressibility normal  Vascular Doppler:  not done  Needle advanced into vessel with real time Ultrasound guidance.  Guidewire confirmed in vessel.  Sterile sheath used.Insertion Attempts: 2  Assessment  Dressing: secured with tape and tegaderm  Patient: Tolerated well

## 2018-03-08 NOTE — TRANSFER OF CARE
"Anesthesia Transfer of Care Note    Patient: Tim Richards    Procedure(s) Performed: Procedure(s) (LRB):  Insertion-Left Ventricular Assist Device (N/A)    Patient location: ICU    Anesthesia Type: general    Transport from OR: Upon arrival to PACU/ICU, patient attached to ventilator and auscultated to confirm bilateral breath sounds and adequate TV. Transported from OR intubated on 100% O2 by AMBU with adequate controlled ventilation. Continuous ECG monitoring in transport. Continuous SpO2 monitoring in transport. Continuos invasive BP monitoring in transport    Post pain: adequate analgesia    Post assessment: no apparent anesthetic complications    Post vital signs: stable    Level of consciousness: awake    Nausea/Vomiting: no nausea/vomiting    Complications: none    Transfer of care protocol was followed      Last vitals:   Visit Vitals  /79 (BP Location: Left arm, Patient Position: Lying)   Pulse 103   Temp 36.6 °C (97.9 °F) (Axillary)   Resp 17   Ht 6' 1" (1.854 m)   Wt 124 kg (273 lb 5.9 oz)   SpO2 100%   BMI 36.07 kg/m²     "

## 2018-03-08 NOTE — SUBJECTIVE & OBJECTIVE
Interval HPI:   Overnight events:  AF - on epi, norepi, and nicardipine drip.    Remains intubated and sedated, with open chest.  On intensive insulin protocol with rate of 1u/h.  Most recent .    PMH, PSH, FH, SH updated and reviewed       Review of Systems  Unable to obtain due to: Sedation,Intubation,Critical Illness    Current Medications and/or Treatments Impacting Glycemic Control  Immunotherapy:    Immunosuppressants     None        Steroids:   Hormones     None        Pressors:    Autonomic Drugs     Start     Stop Route Frequency Ordered    03/08/18 1445  EPINEPHrine (ADRENALIN) 4 mg in sodium chloride 0.9% 250 mL infusion     Question Answer Comment   Titrate by: (in mcg/kg/min) 0.01    Titrate interval: (in minutes) 5    Titrate to maintain: (SBP or MAP or Cardiac Index) MAP    Greater than: (in mmHg) 60    Maximum dose: (in mcg/kg/min) 2        -- IV Continuous 03/08/18 1347    03/08/18 1445  norepinephrine 4 mg in dextrose 5% 250 mL infusion (premix) (titrating)     Question Answer Comment   Titrate by: (in mcg/kg/min) 0.01    Titrate interval: (in minutes) 5    Titrate to maintain: (MAP or SBP) MAP    Greater than: (in mmHg) 60    Maximum dose: (in mcg/kg/min) 3        -- IV Continuous 03/08/18 1347        Hyperglycemia/Diabetes Medications:   Antihyperglycemics     Start     Stop Route Frequency Ordered    03/08/18 1445  insulin regular (Humulin R) 100 Units in sodium chloride 0.9% 100 mL infusion      -- IV Continuous 03/08/18 1347             PHYSICAL EXAMINATION:  Vitals:    03/08/18 1505   BP:    Pulse: 107   Resp:    Temp:      Body mass index is 36.07 kg/m².    Physical Exam   Constitutional: He appears well-developed.   Critical    HENT:   Right Ear: External ear normal.   Left Ear: External ear normal.   Nose: Nose normal.   Intubated   Neck: No tracheal deviation present.   Unable to perform thyroid exam   Cardiovascular: Normal rate and intact distal pulses.    Pulmonary/Chest: Effort  normal. He has no wheezes.   Ventilatory breath sounds   Abdominal: Soft. There is no tenderness. No hernia.   Musculoskeletal: He exhibits no edema.   Feet no cuts or  scratches  Shoes appropriate   Neurological: He has normal reflexes. No cranial nerve deficit.   Sedated   Skin: No rash noted.   No nodules   Psychiatric: He has a normal mood and affect. Judgment normal.   Vitals reviewed.

## 2018-03-08 NOTE — SUBJECTIVE & OBJECTIVE
No current facility-administered medications on file prior to encounter.      Current Outpatient Prescriptions on File Prior to Encounter   Medication Sig    allopurinol (ZYLOPRIM) 100 MG tablet TAKE 1 TABLET (100 MG TOTAL) BY MOUTH 3 (THREE) TIMES DAILY.    amiodarone (PACERONE) 200 MG Tab TAKE 1 TABLET (200 MG TOTAL) BY MOUTH ONCE DAILY.    aspirin (ECOTRIN) 81 MG EC tablet Take 81 mg by mouth. 1 Tablet, Delayed Release (E.C.) Oral Every day    bumetanide (BUMEX) 2 MG tablet Take 1 tablet (2 mg total) by mouth 2 (two) times daily.    losartan (COZAAR) 25 MG tablet Take 25 mg by mouth once daily.    potassium chloride SA (K-DUR,KLOR-CON) 10 MEQ tablet Take 2 tablets (20 mEq total) by mouth 2 (two) times daily.    spironolactone (ALDACTONE) 25 MG tablet Take 1 tablet (25 mg total) by mouth once daily.    ERGOCALCIFEROL, VITAMIN D2, (VITAMIN D ORAL) Take by mouth once daily.    metOLazone (ZAROXOLYN) 2.5 MG tablet Take 2.5 mg by mouth every other day.    metoprolol succinate (TOPROL-XL) 25 MG 24 hr tablet Take 0.5 tablets (12.5 mg total) by mouth once daily.    valsartan (DIOVAN) 80 MG tablet Take 0.5 tablets (40 mg total) by mouth 2 (two) times daily.    VITAMIN E ACETATE (VITAMIN E ORAL) Take by mouth once daily.       Review of patient's allergies indicates:   Allergen Reactions    Lisinopril Anaphylaxis       Past Medical History:   Diagnosis Date    CHF (congestive heart failure)     Dilated cardiomyopathy 1/10/2018    Disorder of kidney and ureter     CKD    Gout     HTN (hypertension)     Ventricular tachycardia (paroxysmal)      Past Surgical History:   Procedure Laterality Date    CARDIAC CATHETERIZATION  Dec. 2012    CARDIAC DEFIBRILLATOR PLACEMENT Left     CRRT-D    COLONOSCOPY N/A 3/6/2018    Procedure: COLONOSCOPY;  Surgeon: Alonso Bone MD;  Location: [facility] ([address] [place] [address]);  Service: Endoscopy;  Laterality: N/A;     Family History     Problem Relation (Age of Onset)    Cancer  Sister (54)    Coronary artery disease Father    Diabetes Father    Heart disease Father    Hypertension Father    No Known Problems Mother, Brother        Social History Main Topics    Smoking status: Former Smoker     Packs/day: 1.00     Years: 31.00     Types: Cigarettes     Quit date: 1/12/2018    Smokeless tobacco: Never Used      Comment: pt is quiting on his own - pt stated not qualified for program; 2/16 pt  quit on his own    Alcohol use No      Comment: one fifth of gin or rum/week    Drug use: No    Sexual activity: Yes     Partners: Female     Birth control/ protection: None      Comment: 10/5/17  with same partner 7 years      Review of Systems   Constitutional: Negative.    HENT: Negative.    Eyes: Negative.    Respiratory: Negative.    Cardiovascular: Negative.    Gastrointestinal: Negative.    Genitourinary: Negative.    Musculoskeletal: Negative.    Neurological: Negative.    Hematological: Negative.    Psychiatric/Behavioral: Negative.      Objective:     Vital Signs (Most Recent):  Temp: 97.9 °F (36.6 °C) (03/08/18 0300)  Pulse: 86 (03/08/18 0600)  Resp: 16 (03/08/18 0600)  BP: 136/79 (03/07/18 0701)  SpO2: 100 % (03/08/18 0600) Vital Signs (24h Range):  Temp:  [97.9 °F (36.6 °C)-98.5 °F (36.9 °C)] 97.9 °F (36.6 °C)  Pulse:  [] 86  Resp:  [14-24] 16  SpO2:  [94 %-100 %] 100 %     Weight: 124 kg (273 lb 5.9 oz)  Body mass index is 36.07 kg/m².    SpO2: 100 %  O2 Device (Oxygen Therapy): room air     Intake/Output - Last 3 Shifts       03/06 0700 - 03/07 0659 03/07 0700 - 03/08 0659 03/08 0700 - 03/09 0659    P.O. 425 250     I.V. (mL/kg) 674 (5.4) 483.2 (3.9)     IV Piggyback 250 300     Total Intake(mL/kg) 1349 (10.9) 1033.2 (8.3)     Urine (mL/kg/hr) 1680 (0.6) 4855 (1.6)     Stool 0 (0) 0 (0)     Total Output 1680 4855      Net -331 -0630.8             Stool Occurrence 0 x 0 x            Lines/Drains/Airways     Airway                 Airway - Non-Surgical 03/08/18 0708  Endotracheal Tube less than 1 day          Peripheral Intravenous Line                 Midline Catheter Insertion/Assessment  - Single Lumen 03/07/18 1130 Right cephalic vein (lateral side of arm) 18g x 8cm less than 1 day         Peripheral IV - Single Lumen 03/07/18 1130 Right Forearm less than 1 day                 INTERMACS Score: 2    Physical Exam   Constitutional: He is oriented to person, place, and time. He appears well-developed and well-nourished. No distress.   HENT:   Head: Normocephalic and atraumatic.   Neck: Normal range of motion.   Cardiovascular: Normal rate.    Pulmonary/Chest: Effort normal. No respiratory distress.   Abdominal: He exhibits no distension.   Neurological: He is alert and oriented to person, place, and time.   Skin: He is not diaphoretic.   Psychiatric: He has a normal mood and affect. His behavior is normal. Thought content normal.       Significant Labs:  CBC:   Recent Labs  Lab 03/08/18 0212   WBC 6.66   RBC 4.23*   HGB 11.4*   HCT 35.9*      MCV 85   MCH 27.0   MCHC 31.8*     CMP:   Recent Labs  Lab 03/08/18 0212   *   CALCIUM 10.0   ALBUMIN 3.6   PROT 7.7   *   K 3.3*   CO2 29   CL 92*   BUN 18   CREATININE 1.8*   ALKPHOS 87   ALT 27   AST 33   BILITOT 1.2*     Coagulation:   Recent Labs  Lab 03/08/18 0212   INR 1.0     LFTs:   Recent Labs  Lab 03/08/18 0212   ALT 27   AST 33   ALKPHOS 87   BILITOT 1.2*   PROT 7.7   ALBUMIN 3.6       Significant Diagnostics:  Echo with LV with 9cm BENTON.  Moderate to severe MR with normal appearing leaflets.   ECHO CONCLUSIONS     1 - Eccentric LVH with severely depressed left ventricular systolic function (EF <10%).     2 - Severe left atrial enlargement.     3 - Mildly to moderately depressed right ventricular systolic function .     4 - Moderate to severe mitral regurgitation.     5 - Mild tricuspid regurgitation.     6 - The estimated PA systolic pressure is 38 mmHg.     7 - Increased LAP and increased LVEDP.

## 2018-03-08 NOTE — PROGRESS NOTES
[facility]  Heart Transplant  Progress Note    Patient Name: Tim Richards  MRN: [id number]  Admission Date: 3/1/2018  Hospital Length of Stay: 7 days  Attending Physician: Yg Kaufman MD  Primary Care Provider: Ja Méndez MD  Principal Problem:Acute decompensated heart failure    Subjective:     Interval History: Intubated, sedated, chest open. S/P HM II today @ 9200.     Continuous Infusions:   sodium chloride 0.9%      dextrose 5 % and 0.45 % NaCl with KCl 40 mEq      epinephrine infusion 0.08 mcg/kg/min (03/08/18 1500)    insulin (HUMAN R) infusion (adults) 1 Units/hr (03/08/18 1500)    nicardipine      nitric oxide gas      norepinephrine bitartrate-D5W 0.04 mcg/kg/min (03/08/18 1500)     Scheduled Meds:   [START ON 3/9/2018] ceFEPime (MAXIPIME) IVPB  1 g Intravenous Q12H    docusate sodium  200 mg Oral QHS    [START ON 3/9/2018] ferrous gluconate  324 mg Oral Daily with breakfast    fluconazole (DIFLUCAN) IVPB  400 mg Intravenous Q24H    magnesium sulfate IVPB  2 g Intravenous Once    mupirocin   Nasal BID    pantoprazole  40 mg Oral Daily    pantoprazole  40 mg Intravenous Daily    potassium chloride  60 mEq Intravenous Once    potassium chloride  40 mEq Intravenous Once    sodium chloride 0.9%  3 mL Intravenous Q8H    [START ON 3/9/2018] vancomycin 750 mg in normal saline 250 mL IVPB (ready to mix system)  750 mg Intravenous Q24H     PRN Meds:albumin human 5%, albuterol sulfate, bisacodyl, dextrose 50%, dextrose 50%, fentaNYL, magnesium hydroxide 400 mg/5 ml, oxyCODONE, oxyCODONE    Review of patient's allergies indicates:   Allergen Reactions    Lisinopril Anaphylaxis     Objective:     Vital Signs (Most Recent):  Temp: 99.5 °F (37.5 °C) (03/08/18 1500)  Pulse: 107 (03/08/18 1505)  Resp: 16 (03/08/18 1500)  BP: 136/79 (03/07/18 0701)  SpO2: 100 % (03/08/18 1505) Vital Signs (24h Range):  Temp:  [97.9 °F (36.6 °C)-99.5 °F (37.5 °C)] 99.5 °F (37.5  °C)  Pulse:  [] 107  Resp:  [14-24] 16  SpO2:  [95 %-100 %] 100 %  Arterial Line BP: (46-99)/(33-54) 93/47     Patient Vitals for the past 72 hrs (Last 3 readings):   Weight   03/07/18 0701 124 kg (273 lb 5.9 oz)   03/06/18 1901 124 kg (273 lb 5.9 oz)   03/06/18 1630 124.1 kg (273 lb 9.5 oz)     Body mass index is 36.07 kg/m².      Intake/Output Summary (Last 24 hours) at 03/08/18 1545  Last data filed at 03/08/18 1500   Gross per 24 hour   Intake             3580 ml   Output             4935 ml   Net            -1355 ml     Hemodynamic Parameters:    Physical Exam   Constitutional: He appears well-developed and well-nourished. No distress.   Intubated and sedated   HENT:   Head: Normocephalic and atraumatic.   Neck: Normal range of motion. No JVD present.   Cardiovascular: Normal rate.  Exam reveals no friction rub.    No murmur heard.  Chest open   Pulmonary/Chest: Effort normal. No respiratory distress. He has no wheezes.   Abdominal: Soft. He exhibits no distension.   Musculoskeletal: Normal range of motion. He exhibits no edema.   Neurological: He is alert.   Skin: Skin is warm. Capillary refill takes 2 to 3 seconds. He is not diaphoretic. No erythema.   L CFA site clean and dry, no hematoma noted.   Psychiatric: He has a normal mood and affect. His behavior is normal. Judgment and thought content normal.     Significant Labs:  CBC:    Recent Labs  Lab 03/07/18  0258 03/08/18  0212  03/08/18  1349 03/08/18  1359 03/08/18  1459   WBC 6.19 6.66  --  25.45*  --   --    RBC 4.10* 4.23*  --  3.91*  --   --    HGB 11.2* 11.4*  --  10.6*  --   --    HCT 35.8* 35.9*  < > 33.2* 34* 35*    174  --  155  --   --    MCV 87 85  --  85  --   --    MCH 27.3 27.0  --  27.1  --   --    MCHC 31.3* 31.8*  --  31.9*  --   --    < > = values in this interval not displayed.  BNP:    Recent Labs  Lab 03/05/18  0820 03/07/18  0258   BNP 1,270* 1,110*     CMP:    Recent Labs  Lab 03/07/18  0258 03/07/18  9894  03/08/18  0212 03/08/18  1349   GLU 95 96 143* 190*   CALCIUM 9.7 9.5 10.0 9.4   ALBUMIN 3.4* 3.4* 3.6  --    PROT 7.0 7.0 7.7  --     136 133* 137   K 3.8 3.6 3.3* 3.0*   CO2 24 26 29 25    99 92* 98   BUN 16 16 18 21*   CREATININE 1.6* 1.6* 1.8* 1.9*   ALKPHOS 81 85 87  --    ALT 18 19 27  --    AST 27 28 33  --    BILITOT 1.4* 1.2* 1.2*  --       Coagulation:     Recent Labs  Lab 03/01/18  1721 03/08/18  0212 03/08/18  1349   INR 1.1 1.0 1.2   APTT  --   --  23.3     LDH:    Recent Labs  Lab 03/08/18  0212        Microbiology:  Microbiology Results (last 7 days)     Procedure Component Value Units Date/Time    IV catheter culture [[id number]] Collected:  03/07/18 1151    Order Status:  Sent Specimen:  Catheter Tip from Catheter Tip, Intrajugular Updated:  03/08/18 0727    Blood culture [[id number]] Collected:  03/06/18 1107    Order Status:  Completed Specimen:  Blood Updated:  03/07/18 1612     Blood Culture, Routine No Growth to date     Blood Culture, Routine No Growth to date    Blood culture [[id number]] Collected:  03/06/18 1107    Order Status:  Completed Specimen:  Blood Updated:  03/07/18 1612     Blood Culture, Routine No Growth to date     Blood Culture, Routine No Growth to date    Blood culture [[id number]]     Order Status:  Canceled Specimen:  Blood     Blood culture [[id number]]     Order Status:  Canceled Specimen:  Blood           I have reviewed all pertinent labs within the past 24 hours.    Estimated Creatinine Clearance: 63.4 mL/min (A) (based on SCr of 1.9 mg/dL (H)).    Diagnostic Results:  I have reviewed and interpreted all pertinent imaging results/findings within the past 24 hours.    Assessment and Plan:     Mr. Richards is a 52 y.o. year old Black or  male who has presents as f/u from hospitalization for ADHF after presenting to clinic 1/10/17 as initial consult. advanced surgical options (LVAD/OHT).    This 53 yo BM has had CHF since 2011 at which time an  angiogram did not demonstrate any CAD.  He is on amiodarone for VT. He was admitted from 1/12-1/17/18 (see d/c summary) where he was treated for ADHF and initiation of w/u for advanced options. RHC during that admission showed RA 17 and PCWP 35 as well as severely reduced CI 1.6. Though not optimized, he was discharged per his request so as not to lose his job/insurance--see Dr. Hadley's attestation for full details on d/c summary. Since d/c, his Scr has risen from 2.0 on discharge to 2.8 (1/23/18). His BNP had declined to 1040.  He presents today for scheduled hospital f/u.      Today, he reports feeling well. He says he has more energy than usual. His only complaints are cramping and xerosis. He denies n/v/d, no CP, palpitations or syncope. He has stable orthostatic dizziness/LH. No PND or orthopnea. His COLEMAN is improved from discharge and his weight is down about 5-7 pounds on his home scale. Of note, his Scr on labs from 2 days ago showed Scr 2.8 up from 2.0. T. Bili was down from 1.1 to 0.6 and BNP was down to 1040 from 1700.  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 No labs initially ordered but I ordered and sent him down.     LVAD (left ventricular assist device) present    -HeartMate II Implanted 3/8/18 as DT  -CTS Primary  -Implanted by Dr. Kaufman  -Anticoagulation per Primary Coumadin  -Speed set at 9200, LSL 8800 rpm  -Interrogation notable for no events  -Not listed for OHTx  - On Levo, Epi, Cardene and NO         Ivette Gustafson PA-C  Heart Transplant  [facility]

## 2018-03-08 NOTE — PLAN OF CARE
No acute events throughout day, VS and assessment per flow sheet, Patient is on Lasix and Dobutamine drip, with IABP @ 1:1/ECG/Auto/ Aug , SBP 80s-90s, MAP> 65. 2x CHD bath was given, Patient is scheduled for an LVAD @ 0700. Patient progressing towards goals as tolerated, plan of care reviewed with Tim Richards and family, all concerns addressed, will continue to monitor.

## 2018-03-08 NOTE — ASSESSMENT & PLAN NOTE
-HeartMate II Implanted 3/8/18 as DT  -CTS Primary  -Implanted by Dr. Kaufman  -Anticoagulation per Primary Coumadin  -Speed set at 9200, LSL 8800 rpm  -Interrogation notable for no events  -Not listed for OHTx  - On Levo, Epi, Cardene and NO

## 2018-03-08 NOTE — PROGRESS NOTES
Patient intubated and sedated with chest open and IABP intact. No family at bedside, spoke to wife walking in the hallway. VAD interrogation completed in the event changes needed to be made. Will continue to monitor for further issues.     Pulsatile: Yes, intermittent   VAD Sounds: Smooth  Problems / Issues / Alarms with VAD if any: None noted      VAD Interrogation:  TXP LVAD INTERROGATIONS 3/8/2018 3/8/2018 3/8/2018   Type HeartMate II HeartMate II HeartMate3;HeartMate II   Flow 4.4 4.5 4.3   Speed 9200 9200 9200   PI 5.4 6.1 6.2   Power ([place]) 5.1 5.1 5.1   LSL 8800 8800 9000   Pulsatility - Pulse -   }

## 2018-03-08 NOTE — HPI
This is a 52 year-old gentleman with history of systolic and diastolic heart failure, CKD 3, VT s/p ICD placement, who is now admitted for acute decompensated heart failure.  He has a balloon pump in place.  He has gone through the advanced options workup.   Patient is s/p Heartmate 2 LVAD placement on 3/8/18. Arrived to SICU post-op intubated and sedated.

## 2018-03-08 NOTE — CONSULTS
[facility]  Cardiothoracic Surgery  Consult Note    Patient Name: Tim Richards  MRN: [id number]  Admission Date: 3/1/2018  Attending Physician: Amparo Lopez MD  Referring Provider: Amy Rhodes MD    Patient information was obtained from patient, spouse/SO and past medical records.     Consults  Subjective:     Principal Problem: Acute decompensated heart failure    History of Present Illness: This is a 52 year-old gentleman with history of systolic and diastolic heart failure, CKD 3, VT s/p ICD placement, who is now admitted for acute decompensated heart failure.  He has a balloon pump in place.  He has gone through the advanced options workup.  Plan for LVAD today.    No current facility-administered medications on file prior to encounter.      Current Outpatient Prescriptions on File Prior to Encounter   Medication Sig    allopurinol (ZYLOPRIM) 100 MG tablet TAKE 1 TABLET (100 MG TOTAL) BY MOUTH 3 (THREE) TIMES DAILY.    amiodarone (PACERONE) 200 MG Tab TAKE 1 TABLET (200 MG TOTAL) BY MOUTH ONCE DAILY.    aspirin (ECOTRIN) 81 MG EC tablet Take 81 mg by mouth. 1 Tablet, Delayed Release (E.C.) Oral Every day    bumetanide (BUMEX) 2 MG tablet Take 1 tablet (2 mg total) by mouth 2 (two) times daily.    losartan (COZAAR) 25 MG tablet Take 25 mg by mouth once daily.    potassium chloride SA (K-DUR,KLOR-CON) 10 MEQ tablet Take 2 tablets (20 mEq total) by mouth 2 (two) times daily.    spironolactone (ALDACTONE) 25 MG tablet Take 1 tablet (25 mg total) by mouth once daily.    ERGOCALCIFEROL, VITAMIN D2, (VITAMIN D ORAL) Take by mouth once daily.    metOLazone (ZAROXOLYN) 2.5 MG tablet Take 2.5 mg by mouth every other day.    metoprolol succinate (TOPROL-XL) 25 MG 24 hr tablet Take 0.5 tablets (12.5 mg total) by mouth once daily.    valsartan (DIOVAN) 80 MG tablet Take 0.5 tablets (40 mg total) by mouth 2 (two) times daily.    VITAMIN E ACETATE (VITAMIN E ORAL) Take by mouth once  daily.       Review of patient's allergies indicates:   Allergen Reactions    Lisinopril Anaphylaxis       Past Medical History:   Diagnosis Date    CHF (congestive heart failure)     Dilated cardiomyopathy 1/10/2018    Disorder of kidney and ureter     CKD    Gout     HTN (hypertension)     Ventricular tachycardia (paroxysmal)      Past Surgical History:   Procedure Laterality Date    CARDIAC CATHETERIZATION  Dec. 2012    CARDIAC DEFIBRILLATOR PLACEMENT Left     CRRT-D    COLONOSCOPY N/A 3/6/2018    Procedure: COLONOSCOPY;  Surgeon: Alonso Bone MD;  Location: [address]);  Service: Endoscopy;  Laterality: N/A;     Family History     Problem Relation (Age of Onset)    Cancer Sister (54)    Coronary artery disease Father    Diabetes Father    Heart disease Father    Hypertension Father    No Known Problems Mother, Brother        Social History Main Topics    Smoking status: Former Smoker     Packs/day: 1.00     Years: 31.00     Types: Cigarettes     Quit date: 1/12/2018    Smokeless tobacco: Never Used      Comment: pt is quiting on his own - pt stated not qualified for program; 2/16 pt  quit on his own    Alcohol use No      Comment: one fifth of gin or rum/week    Drug use: No    Sexual activity: Yes     Partners: Female     Birth control/ protection: None      Comment: 10/5/17  with same partner 7 years      Review of Systems   Constitutional: Negative.    HENT: Negative.    Eyes: Negative.    Respiratory: Negative.    Cardiovascular: Negative.    Gastrointestinal: Negative.    Genitourinary: Negative.    Musculoskeletal: Negative.    Neurological: Negative.    Hematological: Negative.    Psychiatric/Behavioral: Negative.      Objective:     Vital Signs (Most Recent):  Temp: 97.9 °F (36.6 °C) (03/08/18 0300)  Pulse: 86 (03/08/18 0600)  Resp: 16 (03/08/18 0600)  BP: 136/79 (03/07/18 0701)  SpO2: 100 % (03/08/18 0600) Vital Signs (24h Range):  Temp:  [97.9 °F (36.6 °C)-98.5 °F (36.9  °C)] 97.9 °F (36.6 °C)  Pulse:  [] 86  Resp:  [14-24] 16  SpO2:  [94 %-100 %] 100 %     Weight: 124 kg (273 lb 5.9 oz)  Body mass index is 36.07 kg/m².    SpO2: 100 %  O2 Device (Oxygen Therapy): room air     Intake/Output - Last 3 Shifts       03/06 0700 - 03/07 0659 03/07 0700 - 03/08 0659 03/08 0700 - 03/09 0659    P.O. 425 250     I.V. (mL/kg) 674 (5.4) 483.2 (3.9)     IV Piggyback 250 300     Total Intake(mL/kg) 1349 (10.9) 1033.2 (8.3)     Urine (mL/kg/hr) 1680 (0.6) 4855 (1.6)     Stool 0 (0) 0 (0)     Total Output 1680 4855      Net -331 -3821.8             Stool Occurrence 0 x 0 x            Lines/Drains/Airways     Airway                 Airway - Non-Surgical 03/08/18 0708 Endotracheal Tube less than 1 day          Peripheral Intravenous Line                 Midline Catheter Insertion/Assessment  - Single Lumen 03/07/18 1130 Right cephalic vein (lateral side of arm) 18g x 8cm less than 1 day         Peripheral IV - Single Lumen 03/07/18 1130 Right Forearm less than 1 day                 INTERMACS Score: 2    Physical Exam   Constitutional: He is oriented to person, place, and time. He appears well-developed and well-nourished. No distress.   HENT:   Head: Normocephalic and atraumatic.   Neck: Normal range of motion.   Cardiovascular: Normal rate.    Pulmonary/Chest: Effort normal. No respiratory distress.   Abdominal: He exhibits no distension.   Neurological: He is alert and oriented to person, place, and time.   Skin: He is not diaphoretic.   Psychiatric: He has a normal mood and affect. His behavior is normal. Thought content normal.       Significant Labs:  CBC:   Recent Labs  Lab 03/08/18 0212   WBC 6.66   RBC 4.23*   HGB 11.4*   HCT 35.9*      MCV 85   MCH 27.0   MCHC 31.8*     CMP:   Recent Labs  Lab 03/08/18 0212   *   CALCIUM 10.0   ALBUMIN 3.6   PROT 7.7   *   K 3.3*   CO2 29   CL 92*   BUN 18   CREATININE 1.8*   ALKPHOS 87   ALT 27   AST 33   BILITOT 1.2*      Coagulation:   Recent Labs  Lab 03/08/18  0212   INR 1.0     LFTs:   Recent Labs  Lab 03/08/18 0212   ALT 27   AST 33   ALKPHOS 87   BILITOT 1.2*   PROT 7.7   ALBUMIN 3.6       Significant Diagnostics:  Echo with LV with 9cm BENTON.  Moderate to severe MR with normal appearing leaflets.   ECHO CONCLUSIONS     1 - Eccentric LVH with severely depressed left ventricular systolic function (EF <10%).     2 - Severe left atrial enlargement.     3 - Mildly to moderately depressed right ventricular systolic function .     4 - Moderate to severe mitral regurgitation.     5 - Mild tricuspid regurgitation.     6 - The estimated PA systolic pressure is 38 mmHg.     7 - Increased LAP and increased LVEDP.    Assessment/Plan:     NYHA Score: NYHA IV: inability to carry on any physical activity without discomfort    * Acute decompensated heart failure    IABP and inotrope dependent acute decompensated heart failure.  INTERMACS 2.  HTS and CTS teams agree with LVAD placement.  Tbili is decreased today.  SCr is holding stable.  Consents signed.          Hossein Segal MD  Thoracic Surgery Resident, PGY6  Cardiothoracic Surgery  [facility]    Agree with the above plan.

## 2018-03-08 NOTE — ANESTHESIA POSTPROCEDURE EVALUATION
"Anesthesia Post Evaluation    Patient: Tim Richards    Procedure(s) Performed: Procedure(s) (LRB):  Insertion-Left Ventricular Assist Device (N/A)    Final Anesthesia Type: general  Patient location during evaluation: ICU  Patient participation: No - Unable to Participate, Intubation  Level of consciousness: responds to stimulation  Post-procedure vital signs: reviewed and stable  Pain management: adequate  Airway patency: patent  PONV status at discharge: No PONV  Anesthetic complications: no      Cardiovascular status: hemodynamically stable  Respiratory status: ventilator and ETT  Hydration status: euvolemic  Follow-up not needed.        Visit Vitals  /68   Pulse 105   Temp 37.5 °C (99.5 °F) (Core ([place]))   Resp 18   Ht 6' 1" (1.854 m)   Wt 124 kg (273 lb 5.9 oz)   SpO2 100%   BMI 36.07 kg/m²       Pain/Iker Score: Pain Assessment Performed: Yes (3/8/2018  2:00 PM)  Presence of Pain: non-verbal indicators absent (3/8/2018  2:00 PM)  Pain Rating Prior to Med Admin: 10 (3/7/2018  1:54 PM)  Pain Rating Post Med Admin: 2 (3/7/2018  1:54 PM)      "

## 2018-03-08 NOTE — CARE UPDATE
Result of beta 2 microglobulin was negative. Essentially unremarkable hypercoagulable work up. Will sign off. Please call with any questions.    Caitlyn Esparza MD   Pager [phone]

## 2018-03-08 NOTE — TRANSFER OF CARE
"Anesthesia Transfer of Care Note    Patient: Tim Richards    Procedure(s) Performed: Procedure(s) (LRB):  Insertion-Left Ventricular Assist Device (N/A)    Patient location: ICU    Anesthesia Type: general    Transport from OR: Upon arrival to PACU/ICU, patient attached to ventilator and auscultated to confirm bilateral breath sounds and adequate TV. Continuous ECG monitoring in transport. Continuous SpO2 monitoring in transport. Continuos invasive BP monitoring in transport. Continuous CVP monitoring in transport. Transported from OR intubated on 100% O2 by AMBU with adequate controlled ventilation    Post pain: adequate analgesia    Post assessment: no apparent anesthetic complications    Post vital signs: stable    Level of consciousness: sedated    Nausea/Vomiting: no nausea/vomiting    Complications: none    Transfer of care protocol was followed      Last vitals:   Visit Vitals  /79 (BP Location: Left arm, Patient Position: Lying)   Pulse 86   Temp 36.6 °C (97.9 °F) (Axillary)   Resp 16   Ht 6' 1" (1.854 m)   Wt 124 kg (273 lb 5.9 oz)   SpO2 100%   BMI 36.07 kg/m²     "

## 2018-03-08 NOTE — HPI
Reason for Consult: Management of Hyperglycemia     Surgical Procedure and Date: 3/8/18 - LVAD placement; 3/9/18 s/p exp lap  Lab Results   Component Value Date    HGBA1C 5.5 03/08/2018       HPI: Patient is a 52 y.o. male with a diagnosis of CHF since 2011 (ICD placement with multiple interrogations) admitted in January for advanced heart failure options. Admitted with decompensated acute heart failure, elevated creatinine. Underwent LVAD 3/8/18.    Endocrinology has been consulted to assist in post op hyperglycemia.

## 2018-03-08 NOTE — ANESTHESIA PROCEDURE NOTES
[facility]    Diagnosis: CHF  Patient location during procedure: done in OR  Procedure start time: 3/8/2018 7:26 AM  Timeout: 3/8/2018 7:25 AM  Procedure end time: 3/8/2018 7:50 AM  Staffing  Anesthesiologist: KAITLIN ISAAC JR  Resident/CRNA: ORALIA KAPOOR  Performed: resident/CRNA   Anesthesiologist was present at the time of the procedure.  Preanesthetic Checklist  Completed: patient identified, site marked, surgical consent, pre-op evaluation, timeout performed, IV checked, risks and benefits discussed, monitors and equipment checked and anesthesia consent given  [facility]  Skin Prep: chlorhexidine gluconate  Local Infiltration: none  Location: right,  internal jugular vein  Vessel Caliber: large, patent, compressibility normal  Vascular Doppler:  not done  Introducer: 9 Fr single lumen, manometry used.  Device: CCO/Oximetric Catheter  Catheter Size: 9 Fr  Catheter placement by yes. Heme positive aspiration all ports. PAC floated with balloon up not wedgedSterile sheath usedInsertion Attempts: 1  Indication: hemodynamic monitoring, intravenous therapy  Ultrasound Guidance  Needle advanced into vessel with real time Ultrasound guidance.  Guidewire confirmed in vessel.  Sterile sheath used.  Assessment  Central Line Bundle Protocol followed. Hand hygiene before procedure, surgical cap worn, surgical mask worn, sterile surgical gloves worn, large sterile drape used.  Verification: blood return  Dressing: sutured in place and taped  Patient: Tolerated Well

## 2018-03-08 NOTE — HPI
This is a 52 year-old gentleman with history of systolic and diastolic heart failure, CKD 3, VT s/p ICD placement, who is now admitted for acute decompensated heart failure.  He has a balloon pump in place.  He has gone through the advanced options workup.  Plan for LVAD today.

## 2018-03-08 NOTE — ASSESSMENT & PLAN NOTE
Neuro:   - Intubated  - Sedated - Propofol     Pulmonary:   - Intubated  Vent Mode: SIMV  Oxygen Concentration (%):  [] 50  Resp Rate Total:  [15 br/min-24 br/min] 18 br/min  Vt Set:  [500 mL] 500 mL  PEEP/CPAP:  [5 cmH20] 5 cmH20  Pressure Support:  [10 cmH20] 10 cmH20  Mean Airway Pressure:  [9.6 roB60-31 cmH20] 11 cmH20  - Scheduled duo-nebs  - Nitric. Wean per CTS     Cardiac:  - s/p LVAD 3/8/18  - Drips: Epi, Levo, Cardene  - Wean pressors per CTS     Renal:   - CKD; baseline Cr. 1.7  - Lagunas in place  - Monitor UOP     Infectious Disease:   - Afebrile, WBC normal  - Routine emilie-op abx  - Trend WBC     Hematology/Oncology:  - Post-op H/H stable  - Trend CBC     Endocrine:  - Insuline gtt  - Endocrine on board     Fluids/Electrolytes/Nutrition/GI:   - NPO, NGT  - Trend CMP  - Replace Lytes PRN     Pain Management::   - PRN Fentanyl     Dispo:  Continue SICU care. CTS primary.

## 2018-03-08 NOTE — PROCEDURES
"Tim Richards is a 52 y.o. male patient.      [employer] CRT-P  Request to turn off Tachytherapies prior to LVAD placement today.  Underlying rhythm is sinus rhythm at 80bpm, not pacemaker dependent.    Tachy-therapy deactivated.          Temp: 97.9 °F (36.6 °C) (03/08/18 0300)  Pulse: 86 (03/08/18 0600)  Resp: 16 (03/08/18 0600)  BP: 136/79 (03/07/18 0701)  SpO2: 100 % (03/08/18 0600)  Weight: 124 kg (273 lb 5.9 oz) (03/07/18 0701)  Height: 6' 1" (185.4 cm) (03/07/18 0701)    ICD programming  Date/Time: 3/8/2018 6:51 AM  Performed by: ZAID SORTO  Authorized by: ZAID SORTO           No flowsheet data found.    Zaid Sorto  3/8/2018    "

## 2018-03-08 NOTE — PROGRESS NOTES
Pt admitted to to 6081 from the OR. Pt transported by anesthesia team connected to portable monitor, monitoring HR, BP, and SpO2. Pt receiving 100% FiO2 via ambu bag by anestheologist. with 20ppm of NO. Levo, epi, propofol, dobutamine. IABP and LVAD in place. LVAD connected to battery. Upon arrival to unit, ICU RN and RT at bedside to receive pt. CTS fellow, Dr. Kaufman at bedside. Pt connected to ICU monitor, all VSS. Pt connected to Ventilator NO still infusing at 20ppm. Levo, epi, dobutamine, and propofol remain infusing. IABP in place. VAD connected to monitor. See flowsheet for numbers. Orders received. Labs sent. See flowsheets     Skin note:    Unable to assess at this time. Pt unable to be turned s/t to chest open.

## 2018-03-08 NOTE — SUBJECTIVE & OBJECTIVE
Follow-up For: Procedure(s) (LRB):  Insertion-Left Ventricular Assist Device (N/A)    Post-Operative Day: Day of Surgery     Past Medical History:   Diagnosis Date    CHF (congestive heart failure)     Dilated cardiomyopathy 1/10/2018    Disorder of kidney and ureter     CKD    Gout     HTN (hypertension)     Ventricular tachycardia (paroxysmal)        Past Surgical History:   Procedure Laterality Date    CARDIAC CATHETERIZATION  Dec. 2012    CARDIAC DEFIBRILLATOR PLACEMENT Left     CRRT-D    COLONOSCOPY N/A 3/6/2018    Procedure: COLONOSCOPY;  Surgeon: Alonso Bone MD;  Location: [facility] ([address] [place] [address]);  Service: Endoscopy;  Laterality: N/A;       Review of patient's allergies indicates:   Allergen Reactions    Aspirin Other (See Comments)     Prevent II patient- Do NOT order aspirin. Please call Yenny [phone] if patient is admitted to hospital    Lisinopril Anaphylaxis       Family History     Problem Relation (Age of Onset)    Cancer Sister (54)    Coronary artery disease Father    Diabetes Father    Heart disease Father    Hypertension Father    No Known Problems Mother, Brother        Social History Main Topics    Smoking status: Former Smoker     Packs/day: 1.00     Years: 31.00     Types: Cigarettes     Quit date: 1/12/2018    Smokeless tobacco: Never Used      Comment: pt is quiting on his own - pt stated not qualified for program; 2/16 pt  quit on his own    Alcohol use No      Comment: one fifth of gin or rum/week    Drug use: No    Sexual activity: Yes     Partners: Female     Birth control/ protection: None      Comment: 10/5/17  with same partner 7 years       Review of Systems   Unable to perform ROS: Intubated     Objective:     Vital Signs (Most Recent):  Temp: 99 °F (37.2 °C) (03/08/18 1600)  Pulse: 98 (03/08/18 1600)  Resp: 16 (03/08/18 1600)  BP: 107/68 (03/08/18 1515)  SpO2: 100 % (03/08/18 1600) Vital Signs (24h Range):  Temp:  [97.9 °F (36.6 °C)-99.5 °F (37.5 °C)] 99 °F  (37.2 °C)  Pulse:  [] 98  Resp:  [14-24] 16  SpO2:  [95 %-100 %] 100 %  BP: (107)/(68) 107/68  Arterial Line BP: ()/(33-55) 103/51     Weight: 124 kg (273 lb 5.9 oz)  Body mass index is 36.07 kg/m².      Intake/Output Summary (Last 24 hours) at 03/08/18 1644  Last data filed at 03/08/18 1610   Gross per 24 hour   Intake             4059 ml   Output             5060 ml   Net            -1001 ml       Physical Exam   Constitutional: He appears well-developed and well-nourished.   HENT:   Head: Normocephalic and atraumatic.   ETT, OGT in place   Eyes: EOM are normal. Pupils are equal, round, and reactive to light.   Neck: Neck supple.   Cardiovascular:   Mechanical hum   Pulmonary/Chest: Breath sounds normal. No respiratory distress. He has no wheezes.   Intubated. Mediastinal chest tubes x2 in place. SS output   Abdominal: Soft. He exhibits no distension. There is no tenderness.   Genitourinary:   Genitourinary Comments: Lagunas in place   Neurological:   Intubated, sedated     Vents:  Vent Mode: SIMV (03/08/18 1505)  Ventilator Initiated: Yes (03/08/18 1400)  Set Rate: 16 bmp (03/08/18 1505)  Vt Set: 500 mL (03/08/18 1505)  Pressure Support: 10 cmH20 (03/08/18 1505)  PEEP/CPAP: 5 cmH20 (03/08/18 1505)  Oxygen Concentration (%): 50 (03/08/18 1600)  Peak Airway Pressure: 29 cmH2O (03/08/18 1505)  Plateau Pressure: 0 cmH20 (03/08/18 1505)  Total Ve: 8.05 mL (03/08/18 1505)  F/VT Ratio<105 (RSBI): (!) 36.48 (03/08/18 1400)    Lines/Drains/Airways     Central Venous Catheter Line                 Percutaneous Central Line Insertion/Assessment - triple lumen  03/08/18 0726 right internal jugular less than 1 day         Pulmonary Artery Catheter Assessment  03/08/18 0726 less than 1 day          Drain                 Chest Tube 03/08/18 1125 Mediastinal 32 Fr. less than 1 day         Chest Tube 03/08/18 1125 Mediastinal 32 Fr. less than 1 day         NG/OG Tube 03/08/18 0731 Casey palacio 14 Fr. Right mouth less than  1 day         Urethral Catheter 03/08/18 0736 Temperature probe;Straight-tip;Non-latex 16 Fr. less than 1 day          Airway                 Airway - Non-Surgical 03/08/18 0824 Endotracheal Tube less than 1 day          Arterial Line                 Arterial Line 03/08/18 0721 Left Radial less than 1 day          Line                 VAD 03/08/18 1124 Left ventricular assist device HeartMate II less than 1 day          Peripheral Intravenous Line                 Midline Catheter Insertion/Assessment  - Single Lumen 03/07/18 1130 Right cephalic vein (lateral side of arm) 18g x 8cm 1 day         Peripheral IV - Single Lumen 03/07/18 1130 Right Forearm 1 day         Peripheral IV - Single Lumen 03/08/18 0739 Left Forearm less than 1 day                Significant Labs:    CBC/Anemia Profile:    Recent Labs  Lab 03/07/18  0258 03/08/18  0212 03/08/18  1349 03/08/18  1359 03/08/18  1459   WBC 6.19 6.66  --  25.45*  --   --    HGB 11.2* 11.4*  --  10.6*  --   --    HCT 35.8* 35.9*  < > 33.2* 34* 35*    174  --  155  --   --    MCV 87 85  --  85  --   --    RDW 15.6* 15.2*  --  15.1*  --   --    < > = values in this interval not displayed.     Chemistries:    Recent Labs  Lab 03/07/18  0258 03/07/18  1524 03/08/18  0212 03/08/18  1349    136 133* 137   K 3.8 3.6 3.3* 3.0*    99 92* 98   CO2 24 26 29 25   BUN 16 16 18 21*   CREATININE 1.6* 1.6* 1.8* 1.9*   CALCIUM 9.7 9.5 10.0 9.4   ALBUMIN 3.4* 3.4* 3.6  --    PROT 7.0 7.0 7.7  --    BILITOT 1.4* 1.2* 1.2*  --    ALKPHOS 81 85 87  --    ALT 18 19 27  --    AST 27 28 33  --    MG 1.8  --  2.1 1.7   PHOS  --   --   --  3.4       Recent Lab Results       03/08/18  1606 03/08/18  1602 03/08/18  1459 03/08/18  1459 03/08/18  1454      Immature Granulocytes          Immature Grans (Abs)          Albumin          Alkaline Phosphatase          Allens Test  N/A N/A N/A      ALT          Anion Gap          aPTT          AST          Baso #          Basophil%           Total Bilirubin          Site  Nataly/UAC Nataly/UAC Nataly/UAC      BUN, Bld          Calcium          Chloride          CO2          Creatinine          DelSys  Adult Vent Adult Vent Adult Vent      Differential Method          eGFR if           eGFR if non           Eos #          Eosinophil%          FiO2  50 50 50      Flow  60 60 60      Glucose          Gran # (ANC)          Gran%          Hematocrit          Hemoglobin          Coumadin Monitoring INR          LD          Lymph #          Lymph%          Magnesium          MCH          MCHC          MCV          Mode  SIMV SIMV SIMV      Mono #          Mono%          MPV          nRBC          PEEP  5 5 5      Phosphorus          PiP  27 28 30      Platelets          POC BE  5 3 2      POC Glucose          POC HCO3  30.5(H) 28.2(H) 27.0      POC Hematocrit    35(L)      POC Ionized Calcium    1.15      POC Lactate  4.60(HH) 4.57(HH)       POC PCO2  50.4(H) 45.0 44.7      POC PH  7.389 7.405 7.389      POC PO2  173(H) 72(L) 68(L)      POC Potassium    3.0(L)      POC SATURATED O2  100 94(L) 93(L)      POC Sodium    137      POC TCO2  32(H) 30(H) 28(H)      POCT Glucose 173(H)    163(H)     Potassium          Total Protein          Protime          PS  10 10 10      Rate  16 16 16      RBC          RDW          Sample  ARTERIAL ARTERIAL ARTERIAL      Sodium          Sp02  100 100 99      Vt  500 500 500      WBC                      03/08/18  1414 03/08/18  1400 03/08/18  1359 03/08/18  1357 03/08/18  1349      Immature Granulocytes     0.9(H)     Immature Grans (Abs)     0.23  Comment:  Mild elevation in immature granulocytes is non specific and   can be seen in a variety of conditions including stress response,   acute inflammation, trauma and pregnancy. Correlation with other   laboratory and clinical findings is essential.  (H)     Albumin          Alkaline Phosphatase          Allens Test N/A N/A N/A       ALT           Anion Gap     14     aPTT     23.3  Comment:  aPTT therapeutic range = 39-69 seconds     AST          Baso #     0.04     Basophil%     0.2     Total Bilirubin          Site Other Nataly/UAC Nataly/UAC       BUN, Bld     21(H)     Calcium     9.4     Chloride     98     CO2     25     Creatinine     1.9(H)     DelSys  Adult Vent Adult Vent       Differential Method     Automated     eGFR if      46.2(A)     eGFR if non      39.9  Comment:  Calculation used to obtain the estimated glomerular filtration  rate (eGFR) is the CKD-EPI equation.   (A)     Eos #     0.1     Eosinophil%     0.2     FiO2  100 100       Flow  60 60       Glucose     190(H)     Gran # (ANC)     22.4(H)     Gran%     88.0(H)     Hematocrit     33.2(L)     Hemoglobin     10.6(L)     Coumadin Monitoring INR     1.2  Comment:  Coumadin Therapy:  2.0 - 3.0 for INR for all indicators except mechanical heart valves  and antiphospholipid syndromes which should use 2.5 - 3.5.       LD          Lymph #     1.2     Lymph%     4.6(L)     Magnesium     1.7     MCH     27.1     MCHC     31.9(L)     MCV     85     Mode  SIMV SIMV       Mono #     1.5(H)     Mono%     6.1     MPV     11.5     nRBC     0     PEEP  5 5       Phosphorus     3.4     PiP  31 28       Platelets     155     POC BE 5 7 6       POC Glucose          POC HCO3 30.2(H) 31.5(H) 30.6(H)       POC Hematocrit   34(L)       POC Ionized Calcium   1.17       POC Lactate  4.18(HH)        POC PCO2 51.3(H) 48.1(H) 48.0(H)       POC PH 7.378 7.424 7.412       POC PO2 36(LL) 211(H) 205(H)       POC Potassium   2.8(LL)       POC SATURATED O2 67(L) 100 100       POC Sodium   135(L)       POC TCO2 32(H) 33(H) 32(H)       POCT Glucose    178(H)      Potassium     3.0(L)     Total Protein          Protime     12.1     PS  10 10       Rate  16 16       RBC     3.91(L)     RDW     15.1(H)     Sample VENOUS ARTERIAL ARTERIAL       Sodium     137     Sp02  100 100       Vt  500  500       WBC     25.45(H)                 03/08/18  1220 03/08/18  1156 03/08/18  1128 03/08/18  1100 03/08/18  1034      Immature Granulocytes          Immature Grans (Abs)          Albumin          Alkaline Phosphatase          Allens Test          ALT          Anion Gap          aPTT          AST          Baso #          Basophil%          Total Bilirubin          Site          BUN, Bld          Calcium          Chloride          CO2          Creatinine          DelSys          Differential Method          eGFR if           eGFR if non           Eos #          Eosinophil%          FiO2 1 1 0.8 80 0.9     Flow          Glucose          Gran # (ANC)          Gran%          Hematocrit          Hemoglobin          Coumadin Monitoring INR          LD          Lymph #          Lymph%          Magnesium          MCH          MCHC          MCV          Mode          Mono #          Mono%          MPV          nRBC          PEEP          Phosphorus          PiP          Platelets          POC BE 9 9 11 9 13     POC Glucose 156(H) 169(H) 184(H) 189(H) 192(H)     POC HCO3 33.1(H) 32.5(H) 34.6(H) 32.9(H) 34.3(H)     POC Hematocrit 33(L) 34(L) 37 34(L) 33(L)     POC Ionized Calcium 1.12 1.13 1.12 1.09 1.03(L)     POC Lactate          POC PCO2 45.0 43.9 48.3(H) 46.4(H) 35.4     POC PH 7.474(H) 7.477(H) 7.463(H) 7.458(H) 7.595(HH)     POC PO2 35(LL) 36(LL) 34(LL) 54 400(H)     POC Potassium 3.4(L) 3.5 3.7 3.0(L) 2.8(LL)     POC SATURATED O2 71(L) 73(L) 68(L) 89(L) 100     POC Sodium 134(L) 134(L) 133(L) 133(L) 133(L)     POC TCO2 34(H) 34(H) 36(H) 34(H) 35(H)     POCT Glucose          Potassium          Total Protein          Protime          PS          Rate          RBC          RDW          Sample VENOUS VENOUS VENOUS VENOUS ARTERIAL     Sodium          Sp02          Vt          WBC                      03/08/18  0212      Immature Granulocytes 0.2     Immature Grans (Abs)  0.01  Comment:  Mild elevation in immature granulocytes is non specific and   can be seen in a variety of conditions including stress response,   acute inflammation, trauma and pregnancy. Correlation with other   laboratory and clinical findings is essential.       Albumin 3.6     Alkaline Phosphatase 87     Allens Test      ALT 27     Anion Gap 12     aPTT      AST 33     Baso # 0.01     Basophil% 0.2     Total Bilirubin 1.2  Comment:  For infants and newborns, interpretation of results should be based  on gestational age, weight and in agreement with clinical  observations.  Premature Infant recommended reference ranges:  Up to 24 hours.............<8.0 mg/dL  Up to 48 hours............<12.0 mg/dL  3-5 days..................<15.0 mg/dL  6-29 days.................<15.0 mg/dL  (H)     Site      BUN, Bld 18     Calcium 10.0     Chloride 92(L)     CO2 29     Creatinine 1.8(H)     DelSys      Differential Method Automated     eGFR if  49.3(A)     eGFR if non  42.6  Comment:  Calculation used to obtain the estimated glomerular filtration  rate (eGFR) is the CKD-EPI equation.   (A)     Eos # 0.0     Eosinophil% 0.2     FiO2      Flow      Glucose 143(H)     Gran # (ANC) 5.4     Gran% 81.3(H)     Hematocrit 35.9(L)     Hemoglobin 11.4(L)     Coumadin Monitoring INR 1.0  Comment:  Coumadin Therapy:  2.0 - 3.0 for INR for all indicators except mechanical heart valves  and antiphospholipid syndromes which should use 2.5 - 3.5.         Comment:  Results are increased in hemolyzed samples.     Lymph # 0.6(L)     Lymph% 8.9(L)     Magnesium 2.1     MCH 27.0     MCHC 31.8(L)     MCV 85     Mode      Mono # 0.6     Mono% 9.2     MPV 10.8     nRBC 0     PEEP      Phosphorus      PiP      Platelets 174     POC BE      POC Glucose      POC HCO3      POC Hematocrit      POC Ionized Calcium      POC Lactate      POC PCO2      POC PH      POC PO2      POC Potassium      POC SATURATED O2      POC  Sodium      POC TCO2      POCT Glucose      Potassium 3.3(L)     Total Protein 7.7     Protime 10.8     PS      Rate      RBC 4.23(L)     RDW 15.2(H)     Sample      Sodium 133(L)     Sp02      Vt      WBC 6.66           Significant Imaging: I have reviewed all pertinent imaging results/findings within the past 24 hours.

## 2018-03-08 NOTE — SUBJECTIVE & OBJECTIVE
Interval History: Intubated, sedated, chest open. S/P HM II today @ 9200.     Continuous Infusions:   sodium chloride 0.9%      dextrose 5 % and 0.45 % NaCl with KCl 40 mEq      epinephrine infusion 0.08 mcg/kg/min (03/08/18 1500)    insulin (HUMAN R) infusion (adults) 1 Units/hr (03/08/18 1500)    nicardipine      nitric oxide gas      norepinephrine bitartrate-D5W 0.04 mcg/kg/min (03/08/18 1500)     Scheduled Meds:   [START ON 3/9/2018] ceFEPime (MAXIPIME) IVPB  1 g Intravenous Q12H    docusate sodium  200 mg Oral QHS    [START ON 3/9/2018] ferrous gluconate  324 mg Oral Daily with breakfast    fluconazole (DIFLUCAN) IVPB  400 mg Intravenous Q24H    magnesium sulfate IVPB  2 g Intravenous Once    mupirocin   Nasal BID    pantoprazole  40 mg Oral Daily    pantoprazole  40 mg Intravenous Daily    potassium chloride  60 mEq Intravenous Once    potassium chloride  40 mEq Intravenous Once    sodium chloride 0.9%  3 mL Intravenous Q8H    [START ON 3/9/2018] vancomycin 750 mg in normal saline 250 mL IVPB (ready to mix system)  750 mg Intravenous Q24H     PRN Meds:albumin human 5%, albuterol sulfate, bisacodyl, dextrose 50%, dextrose 50%, fentaNYL, magnesium hydroxide 400 mg/5 ml, oxyCODONE, oxyCODONE    Review of patient's allergies indicates:   Allergen Reactions    Lisinopril Anaphylaxis     Objective:     Vital Signs (Most Recent):  Temp: 99.5 °F (37.5 °C) (03/08/18 1500)  Pulse: 107 (03/08/18 1505)  Resp: 16 (03/08/18 1500)  BP: 136/79 (03/07/18 0701)  SpO2: 100 % (03/08/18 1505) Vital Signs (24h Range):  Temp:  [97.9 °F (36.6 °C)-99.5 °F (37.5 °C)] 99.5 °F (37.5 °C)  Pulse:  [] 107  Resp:  [14-24] 16  SpO2:  [95 %-100 %] 100 %  Arterial Line BP: (46-99)/(33-54) 93/47     Patient Vitals for the past 72 hrs (Last 3 readings):   Weight   03/07/18 0701 124 kg (273 lb 5.9 oz)   03/06/18 1901 124 kg (273 lb 5.9 oz)   03/06/18 1630 124.1 kg (273 lb 9.5 oz)     Body mass index is 36.07  kg/m².      Intake/Output Summary (Last 24 hours) at 03/08/18 1545  Last data filed at 03/08/18 1500   Gross per 24 hour   Intake             3580 ml   Output             4935 ml   Net            -1355 ml     Hemodynamic Parameters:    Physical Exam   Constitutional: He appears well-developed and well-nourished. No distress.   Intubated and sedated   HENT:   Head: Normocephalic and atraumatic.   Neck: Normal range of motion. No JVD present.   Cardiovascular: Normal rate.  Exam reveals no friction rub.    No murmur heard.  Chest open   Pulmonary/Chest: Effort normal. No respiratory distress. He has no wheezes.   Abdominal: Soft. He exhibits no distension.   Musculoskeletal: Normal range of motion. He exhibits no edema.   Neurological: He is alert.   Skin: Skin is warm. Capillary refill takes 2 to 3 seconds. He is not diaphoretic. No erythema.   L CFA site clean and dry, no hematoma noted.   Psychiatric: He has a normal mood and affect. His behavior is normal. Judgment and thought content normal.     Significant Labs:  CBC:    Recent Labs  Lab 03/07/18 0258 03/08/18 0212 03/08/18  1349 03/08/18  1359 03/08/18  1459   WBC 6.19 6.66  --  25.45*  --   --    RBC 4.10* 4.23*  --  3.91*  --   --    HGB 11.2* 11.4*  --  10.6*  --   --    HCT 35.8* 35.9*  < > 33.2* 34* 35*    174  --  155  --   --    MCV 87 85  --  85  --   --    MCH 27.3 27.0  --  27.1  --   --    MCHC 31.3* 31.8*  --  31.9*  --   --    < > = values in this interval not displayed.  BNP:    Recent Labs  Lab 03/05/18  0820 03/07/18 0258   BNP 1,270* 1,110*     CMP:    Recent Labs  Lab 03/07/18  0258 03/07/18  1524 03/08/18  0212 03/08/18  1349   GLU 95 96 143* 190*   CALCIUM 9.7 9.5 10.0 9.4   ALBUMIN 3.4* 3.4* 3.6  --    PROT 7.0 7.0 7.7  --     136 133* 137   K 3.8 3.6 3.3* 3.0*   CO2 24 26 29 25    99 92* 98   BUN 16 16 18 21*   CREATININE 1.6* 1.6* 1.8* 1.9*   ALKPHOS 81 85 87  --    ALT 18 19 27  --    AST 27 28 33  --    BILITOT  1.4* 1.2* 1.2*  --       Coagulation:     Recent Labs  Lab 03/01/18  1721 03/08/18  0212 03/08/18  1349   INR 1.1 1.0 1.2   APTT  --   --  23.3     LDH:    Recent Labs  Lab 03/08/18  0212        Microbiology:  Microbiology Results (last 7 days)     Procedure Component Value Units Date/Time    IV catheter culture [[id number]] Collected:  03/07/18 1151    Order Status:  Sent Specimen:  Catheter Tip from Catheter Tip, Intrajugular Updated:  03/08/18 0727    Blood culture [[id number]] Collected:  03/06/18 1107    Order Status:  Completed Specimen:  Blood Updated:  03/07/18 1612     Blood Culture, Routine No Growth to date     Blood Culture, Routine No Growth to date    Blood culture [[id number]] Collected:  03/06/18 1107    Order Status:  Completed Specimen:  Blood Updated:  03/07/18 1612     Blood Culture, Routine No Growth to date     Blood Culture, Routine No Growth to date    Blood culture [[id number]]     Order Status:  Canceled Specimen:  Blood     Blood culture [[id number]]     Order Status:  Canceled Specimen:  Blood           I have reviewed all pertinent labs within the past 24 hours.    Estimated Creatinine Clearance: 63.4 mL/min (A) (based on SCr of 1.9 mg/dL (H)).    Diagnostic Results:  I have reviewed and interpreted all pertinent imaging results/findings within the past 24 hours.

## 2018-03-08 NOTE — ANESTHESIA PROCEDURE NOTES
Intubation    Diagnosis: CHF  Patient location during procedure: done in OR  Staffing  Anesthesiologist: KAITLIN ISAAC JR  Resident/CRNA: ORALIA KAPOOR  Performed: resident/CRNA   Anesthesiologist was present at the time of the procedure.  Preanesthetic Checklist  Completed: patient identified, site marked, surgical consent, pre-op evaluation, timeout performed, IV checked, risks and benefits discussed, monitors and equipment checked and anesthesia consent given  Intubation  Indication: surgery  Position Confirmation: fiberoptic bronchoscopic inspection  Eye Care: lubricated and taped closed after induction / before airway management  Additional Notes  4.5 Air-Q LMA placed. Easy Ventilation. Fiberoptic placed through LMA. Grade I view with Air-Q. Easily passed scope through cords to level above the tyson. 8.0 ETT threaded off scope through LMA. Position confirmed with FOB. LMA removed. ETT left in place. No complications noted.

## 2018-03-08 NOTE — PROGRESS NOTES
Received pt from OR and placed on  and Inovent on documented settings. ETT is patent and secured. BBS noted. ABG and EKG done.  Ambu bag set up to inovent.  Will continue to monitor.

## 2018-03-09 ENCOUNTER — ANESTHESIA EVENT (OUTPATIENT)
Dept: SURGERY | Facility: HOSPITAL | Age: 53
DRG: 001 | End: 2018-03-09
Payer: COMMERCIAL

## 2018-03-09 ENCOUNTER — DOCUMENTATION ONLY (OUTPATIENT)
Dept: ELECTROPHYSIOLOGY | Facility: CLINIC | Age: 53
End: 2018-03-09

## 2018-03-09 ENCOUNTER — ANESTHESIA (OUTPATIENT)
Dept: SURGERY | Facility: HOSPITAL | Age: 53
DRG: 001 | End: 2018-03-09
Payer: COMMERCIAL

## 2018-03-09 PROBLEM — R79.89 ELEVATED TROPONIN: Status: RESOLVED | Noted: 2017-12-22 | Resolved: 2018-03-09

## 2018-03-09 PROBLEM — R55 SYNCOPE: Status: RESOLVED | Noted: 2017-07-31 | Resolved: 2018-03-09

## 2018-03-09 PROBLEM — I50.9 HEART FAILURE: Status: ACTIVE | Noted: 2018-03-01

## 2018-03-09 PROBLEM — Z12.11 SCREENING FOR COLON CANCER: Status: RESOLVED | Noted: 2018-03-05 | Resolved: 2018-03-09

## 2018-03-09 PROBLEM — Z51.5 PALLIATIVE CARE ENCOUNTER: Status: RESOLVED | Noted: 2018-03-07 | Resolved: 2018-03-09

## 2018-03-09 LAB
ALBUMIN SERPL BCP-MCNC: 3.6 G/DL
ALLENS TEST: ABNORMAL
ALP SERPL-CCNC: 55 U/L
ALT SERPL W/O P-5'-P-CCNC: 27 U/L
ANION GAP SERPL CALC-SCNC: 10 MMOL/L
ANION GAP SERPL CALC-SCNC: 11 MMOL/L
ANION GAP SERPL CALC-SCNC: 11 MMOL/L
ANION GAP SERPL CALC-SCNC: 12 MMOL/L
ANION GAP SERPL CALC-SCNC: 12 MMOL/L
ANION GAP SERPL CALC-SCNC: 13 MMOL/L
APTT BLDCRRT: 24.3 SEC
APTT BLDCRRT: 25 SEC
APTT BLDCRRT: 25.9 SEC
APTT BLDCRRT: 26.7 SEC
APTT BLDCRRT: 36.3 SEC
AST SERPL-CCNC: 89 U/L
BASOPHILS # BLD AUTO: 0.01 K/UL
BASOPHILS # BLD AUTO: 0.01 K/UL
BASOPHILS # BLD AUTO: 0.02 K/UL
BASOPHILS # BLD AUTO: 0.04 K/UL
BASOPHILS NFR BLD: 0.1 %
BASOPHILS NFR BLD: 0.3 %
BILIRUB DIRECT SERPL-MCNC: 1 MG/DL
BILIRUB SERPL-MCNC: 1.4 MG/DL
BLD PROD TYP BPU: NORMAL
BLOOD UNIT EXPIRATION DATE: NORMAL
BLOOD UNIT TYPE CODE: 5100
BLOOD UNIT TYPE: NORMAL
BNP SERPL-MCNC: 1114 PG/ML
BUN SERPL-MCNC: 23 MG/DL
BUN SERPL-MCNC: 23 MG/DL
BUN SERPL-MCNC: 24 MG/DL
BUN SERPL-MCNC: 24 MG/DL
BUN SERPL-MCNC: 25 MG/DL
BUN SERPL-MCNC: 25 MG/DL
CALCIUM SERPL-MCNC: 8.7 MG/DL
CALCIUM SERPL-MCNC: 9 MG/DL
CALCIUM SERPL-MCNC: 9.1 MG/DL
CALCIUM SERPL-MCNC: 9.1 MG/DL
CALCIUM SERPL-MCNC: 9.2 MG/DL
CALCIUM SERPL-MCNC: 9.5 MG/DL
CHLORIDE SERPL-SCNC: 100 MMOL/L
CHLORIDE SERPL-SCNC: 100 MMOL/L
CHLORIDE SERPL-SCNC: 99 MMOL/L
CO2 SERPL-SCNC: 23 MMOL/L
CO2 SERPL-SCNC: 24 MMOL/L
CO2 SERPL-SCNC: 25 MMOL/L
CO2 SERPL-SCNC: 25 MMOL/L
CO2 SERPL-SCNC: 26 MMOL/L
CO2 SERPL-SCNC: 26 MMOL/L
CODING SYSTEM: NORMAL
CREAT SERPL-MCNC: 2.1 MG/DL
CREAT SERPL-MCNC: 2.1 MG/DL
CREAT SERPL-MCNC: 2.2 MG/DL
CREAT SERPL-MCNC: 2.3 MG/DL
CRP SERPL-MCNC: 193.1 MG/L
DELSYS: ABNORMAL
DIFFERENTIAL METHOD: ABNORMAL
DISPENSE STATUS: NORMAL
EOSINOPHIL # BLD AUTO: 0 K/UL
EOSINOPHIL NFR BLD: 0.1 %
EOSINOPHIL NFR BLD: 0.2 %
ERYTHROCYTE [DISTWIDTH] IN BLOOD BY AUTOMATED COUNT: 15.2 %
ERYTHROCYTE [DISTWIDTH] IN BLOOD BY AUTOMATED COUNT: 15.2 %
ERYTHROCYTE [DISTWIDTH] IN BLOOD BY AUTOMATED COUNT: 15.3 %
ERYTHROCYTE [SEDIMENTATION RATE] IN BLOOD BY WESTERGREN METHOD: 18 MM/H
ERYTHROCYTE [SEDIMENTATION RATE] IN BLOOD BY WESTERGREN METHOD: 19 MM/H
ERYTHROCYTE [SEDIMENTATION RATE] IN BLOOD BY WESTERGREN METHOD: 20 MM/H
EST. GFR  (AFRICAN AMERICAN): 36.6 ML/MIN/1.73 M^2
EST. GFR  (AFRICAN AMERICAN): 38.7 ML/MIN/1.73 M^2
EST. GFR  (AFRICAN AMERICAN): 40.9 ML/MIN/1.73 M^2
EST. GFR  (AFRICAN AMERICAN): 40.9 ML/MIN/1.73 M^2
EST. GFR  (NON AFRICAN AMERICAN): 31.7 ML/MIN/1.73 M^2
EST. GFR  (NON AFRICAN AMERICAN): 33.4 ML/MIN/1.73 M^2
EST. GFR  (NON AFRICAN AMERICAN): 35.4 ML/MIN/1.73 M^2
EST. GFR  (NON AFRICAN AMERICAN): 35.4 ML/MIN/1.73 M^2
FIO2: 50
FLOW: 60
GLUCOSE SERPL-MCNC: 126 MG/DL
GLUCOSE SERPL-MCNC: 130 MG/DL (ref 70–110)
GLUCOSE SERPL-MCNC: 149 MG/DL
GLUCOSE SERPL-MCNC: 150 MG/DL
GLUCOSE SERPL-MCNC: 153 MG/DL
GLUCOSE SERPL-MCNC: 160 MG/DL
GLUCOSE SERPL-MCNC: 173 MG/DL (ref 70–110)
GLUCOSE SERPL-MCNC: 180 MG/DL (ref 70–110)
GLUCOSE SERPL-MCNC: 188 MG/DL
HCO3 UR-SCNC: 27 MMOL/L (ref 24–28)
HCO3 UR-SCNC: 27.8 MMOL/L (ref 24–28)
HCO3 UR-SCNC: 28 MMOL/L (ref 24–28)
HCO3 UR-SCNC: 28 MMOL/L (ref 24–28)
HCO3 UR-SCNC: 28.1 MMOL/L (ref 24–28)
HCO3 UR-SCNC: 29.2 MMOL/L (ref 24–28)
HCO3 UR-SCNC: 29.7 MMOL/L (ref 24–28)
HCO3 UR-SCNC: 30.2 MMOL/L (ref 24–28)
HCO3 UR-SCNC: 30.2 MMOL/L (ref 24–28)
HCO3 UR-SCNC: 30.6 MMOL/L (ref 24–28)
HCO3 UR-SCNC: 30.8 MMOL/L (ref 24–28)
HCO3 UR-SCNC: 31.2 MMOL/L (ref 24–28)
HCO3 UR-SCNC: 32.3 MMOL/L (ref 24–28)
HCO3 UR-SCNC: 33.6 MMOL/L (ref 24–28)
HCO3 UR-SCNC: 36.9 MMOL/L (ref 24–28)
HCT VFR BLD AUTO: 25.6 %
HCT VFR BLD AUTO: 26.3 %
HCT VFR BLD AUTO: 28 %
HCT VFR BLD AUTO: 28.1 %
HCT VFR BLD AUTO: 30.4 %
HCT VFR BLD AUTO: 30.9 %
HCT VFR BLD CALC: 31 %PCV (ref 36–54)
HCT VFR BLD CALC: 38 %PCV (ref 36–54)
HCT VFR BLD CALC: 39 %PCV (ref 36–54)
HGB BLD-MCNC: 10 G/DL
HGB BLD-MCNC: 10.3 G/DL
HGB BLD-MCNC: 8.6 G/DL
HGB BLD-MCNC: 8.7 G/DL
HGB BLD-MCNC: 9.2 G/DL
HGB BLD-MCNC: 9.3 G/DL
IMM GRANULOCYTES # BLD AUTO: 0.06 K/UL
IMM GRANULOCYTES # BLD AUTO: 0.07 K/UL
IMM GRANULOCYTES # BLD AUTO: 0.07 K/UL
IMM GRANULOCYTES # BLD AUTO: 0.08 K/UL
IMM GRANULOCYTES # BLD AUTO: 0.09 K/UL
IMM GRANULOCYTES # BLD AUTO: 0.11 K/UL
IMM GRANULOCYTES NFR BLD AUTO: 0.4 %
IMM GRANULOCYTES NFR BLD AUTO: 0.5 %
IMM GRANULOCYTES NFR BLD AUTO: 0.6 %
IMM GRANULOCYTES NFR BLD AUTO: 0.6 %
IMM GRANULOCYTES NFR BLD AUTO: 0.7 %
IMM GRANULOCYTES NFR BLD AUTO: 0.8 %
INR PPP: 1.1
INR PPP: 1.1
INR PPP: 1.2
LDH SERPL L TO P-CCNC: 1.06 MMOL/L (ref 0.36–1.25)
LDH SERPL L TO P-CCNC: 1.22 MMOL/L (ref 0.36–1.25)
LDH SERPL L TO P-CCNC: 1.3 MMOL/L (ref 0.36–1.25)
LDH SERPL L TO P-CCNC: 1.45 MMOL/L (ref 0.36–1.25)
LDH SERPL L TO P-CCNC: 1.46 MMOL/L (ref 0.5–2.2)
LDH SERPL L TO P-CCNC: 1.53 MMOL/L (ref 0.36–1.25)
LDH SERPL L TO P-CCNC: 1.66 MMOL/L (ref 0.36–1.25)
LDH SERPL L TO P-CCNC: 2.07 MMOL/L (ref 0.36–1.25)
LDH SERPL L TO P-CCNC: 2.08 MMOL/L (ref 0.36–1.25)
LDH SERPL L TO P-CCNC: 2.43 MMOL/L (ref 0.36–1.25)
LDH SERPL L TO P-CCNC: 2.88 MMOL/L (ref 0.36–1.25)
LDH SERPL L TO P-CCNC: 3.07 MMOL/L (ref 0.36–1.25)
LYMPHOCYTES # BLD AUTO: 0.4 K/UL
LYMPHOCYTES # BLD AUTO: 0.6 K/UL
LYMPHOCYTES # BLD AUTO: 0.7 K/UL
LYMPHOCYTES # BLD AUTO: 0.8 K/UL
LYMPHOCYTES # BLD AUTO: 0.8 K/UL
LYMPHOCYTES # BLD AUTO: 1 K/UL
LYMPHOCYTES NFR BLD: 3.2 %
LYMPHOCYTES NFR BLD: 4.5 %
LYMPHOCYTES NFR BLD: 4.9 %
LYMPHOCYTES NFR BLD: 5.4 %
LYMPHOCYTES NFR BLD: 5.9 %
LYMPHOCYTES NFR BLD: 7 %
MAGNESIUM SERPL-MCNC: 2 MG/DL
MAGNESIUM SERPL-MCNC: 2.1 MG/DL
MAGNESIUM SERPL-MCNC: 2.1 MG/DL
MAGNESIUM SERPL-MCNC: 2.2 MG/DL
MAGNESIUM SERPL-MCNC: 2.4 MG/DL
MAGNESIUM SERPL-MCNC: 2.4 MG/DL
MCH RBC QN AUTO: 27.2 PG
MCH RBC QN AUTO: 27.8 PG
MCH RBC QN AUTO: 27.8 PG
MCH RBC QN AUTO: 28.2 PG
MCH RBC QN AUTO: 28.3 PG
MCH RBC QN AUTO: 28.5 PG
MCHC RBC AUTO-ENTMCNC: 32.7 G/DL
MCHC RBC AUTO-ENTMCNC: 32.7 G/DL
MCHC RBC AUTO-ENTMCNC: 32.9 G/DL
MCHC RBC AUTO-ENTMCNC: 33.2 G/DL
MCHC RBC AUTO-ENTMCNC: 33.3 G/DL
MCHC RBC AUTO-ENTMCNC: 34 G/DL
MCV RBC AUTO: 83 FL
MCV RBC AUTO: 84 FL
MCV RBC AUTO: 84 FL
MCV RBC AUTO: 85 FL
METHEMOGLOBIN: 0.8 % (ref 0–3)
MODE: ABNORMAL
MONOCYTES # BLD AUTO: 1.5 K/UL
MONOCYTES # BLD AUTO: 1.6 K/UL
MONOCYTES # BLD AUTO: 1.7 K/UL
MONOCYTES # BLD AUTO: 1.7 K/UL
MONOCYTES # BLD AUTO: 1.8 K/UL
MONOCYTES # BLD AUTO: 2 K/UL
MONOCYTES NFR BLD: 10.5 %
MONOCYTES NFR BLD: 11.7 %
MONOCYTES NFR BLD: 12.5 %
MONOCYTES NFR BLD: 13.4 %
MONOCYTES NFR BLD: 13.5 %
MONOCYTES NFR BLD: 14 %
NEUTROPHILS # BLD AUTO: 10.2 K/UL
NEUTROPHILS # BLD AUTO: 10.5 K/UL
NEUTROPHILS # BLD AUTO: 11.1 K/UL
NEUTROPHILS # BLD AUTO: 11.2 K/UL
NEUTROPHILS # BLD AUTO: 11.4 K/UL
NEUTROPHILS # BLD AUTO: 12 K/UL
NEUTROPHILS NFR BLD: 78.1 %
NEUTROPHILS NFR BLD: 79.9 %
NEUTROPHILS NFR BLD: 81.2 %
NEUTROPHILS NFR BLD: 81.7 %
NEUTROPHILS NFR BLD: 83 %
NEUTROPHILS NFR BLD: 84.5 %
NRBC BLD-RTO: 0 /100 WBC
NUM UNITS TRANS FFP: NORMAL
PCO2 BLDA: 34.8 MMHG (ref 35–45)
PCO2 BLDA: 35.1 MMHG (ref 35–45)
PCO2 BLDA: 35.3 MMHG (ref 35–45)
PCO2 BLDA: 35.4 MMHG (ref 35–45)
PCO2 BLDA: 36.2 MMHG (ref 35–45)
PCO2 BLDA: 36.8 MMHG (ref 35–45)
PCO2 BLDA: 37.2 MMHG (ref 35–45)
PCO2 BLDA: 37.3 MMHG (ref 35–45)
PCO2 BLDA: 37.5 MMHG (ref 35–45)
PCO2 BLDA: 37.7 MMHG (ref 35–45)
PCO2 BLDA: 37.8 MMHG (ref 35–45)
PCO2 BLDA: 37.9 MMHG (ref 35–45)
PCO2 BLDA: 40.1 MMHG (ref 35–45)
PCO2 BLDA: 42.1 MMHG (ref 35–45)
PCO2 BLDA: 43.3 MMHG (ref 35–45)
PEEP: 5
PH SMN: 7.46 [PH] (ref 7.35–7.45)
PH SMN: 7.48 [PH] (ref 7.35–7.45)
PH SMN: 7.49 [PH] (ref 7.35–7.45)
PH SMN: 7.5 [PH] (ref 7.35–7.45)
PH SMN: 7.5 [PH] (ref 7.35–7.45)
PH SMN: 7.51 [PH] (ref 7.35–7.45)
PH SMN: 7.52 [PH] (ref 7.35–7.45)
PH SMN: 7.54 [PH] (ref 7.35–7.45)
PH SMN: 7.54 [PH] (ref 7.35–7.45)
PH SMN: 7.56 [PH] (ref 7.35–7.45)
PH SMN: 7.56 [PH] (ref 7.35–7.45)
PH SMN: 7.57 [PH] (ref 7.35–7.45)
PHOSPHATE SERPL-MCNC: 3.2 MG/DL
PHOSPHATE SERPL-MCNC: 3.6 MG/DL
PHOSPHATE SERPL-MCNC: 4.6 MG/DL
PHOSPHATE SERPL-MCNC: 4.9 MG/DL
PHOSPHATE SERPL-MCNC: 5 MG/DL
PHOSPHATE SERPL-MCNC: 5.3 MG/DL
PIP: 16
PIP: 22
PIP: 23
PIP: 23
PIP: 24
PIP: 25
PLATELET # BLD AUTO: 107 K/UL
PLATELET # BLD AUTO: 109 K/UL
PLATELET # BLD AUTO: 111 K/UL
PLATELET # BLD AUTO: 120 K/UL
PLATELET # BLD AUTO: 122 K/UL
PLATELET # BLD AUTO: 151 K/UL
PMV BLD AUTO: 10.6 FL
PMV BLD AUTO: 11 FL
PMV BLD AUTO: 11.1 FL
PMV BLD AUTO: 11.7 FL
PMV BLD AUTO: 11.9 FL
PMV BLD AUTO: 12.2 FL
PO2 BLDA: 102 MMHG (ref 80–100)
PO2 BLDA: 120 MMHG (ref 80–100)
PO2 BLDA: 136 MMHG (ref 80–100)
PO2 BLDA: 156 MMHG (ref 80–100)
PO2 BLDA: 158 MMHG (ref 80–100)
PO2 BLDA: 159 MMHG (ref 80–100)
PO2 BLDA: 171 MMHG (ref 80–100)
PO2 BLDA: 176 MMHG (ref 80–100)
PO2 BLDA: 181 MMHG (ref 80–100)
PO2 BLDA: 181 MMHG (ref 80–100)
PO2 BLDA: 186 MMHG (ref 80–100)
PO2 BLDA: 189 MMHG (ref 80–100)
PO2 BLDA: 29 MMHG (ref 40–60)
PO2 BLDA: 325 MMHG (ref 80–100)
PO2 BLDA: 89 MMHG (ref 80–100)
POC BE: 10 MMOL/L
POC BE: 11 MMOL/L
POC BE: 14 MMOL/L
POC BE: 4 MMOL/L
POC BE: 4 MMOL/L
POC BE: 5 MMOL/L
POC BE: 6 MMOL/L
POC BE: 6 MMOL/L
POC BE: 7 MMOL/L
POC BE: 7 MMOL/L
POC BE: 8 MMOL/L
POC BE: 8 MMOL/L
POC BE: 9 MMOL/L
POC IONIZED CALCIUM: 1.09 MMOL/L (ref 1.06–1.42)
POC IONIZED CALCIUM: 1.12 MMOL/L (ref 1.06–1.42)
POC IONIZED CALCIUM: 1.2 MMOL/L (ref 1.06–1.42)
POC SATURATED O2: 100 % (ref 95–100)
POC SATURATED O2: 60 % (ref 95–100)
POC SATURATED O2: 97 % (ref 95–100)
POC SATURATED O2: 98 % (ref 95–100)
POC SATURATED O2: 99 % (ref 95–100)
POC SATURATED O2: 99 % (ref 95–100)
POC TCO2: 28 MMOL/L (ref 23–27)
POC TCO2: 29 MMOL/L (ref 23–27)
POC TCO2: 29 MMOL/L (ref 24–29)
POC TCO2: 30 MMOL/L (ref 23–27)
POC TCO2: 31 MMOL/L (ref 23–27)
POC TCO2: 32 MMOL/L (ref 23–27)
POC TCO2: 33 MMOL/L (ref 23–27)
POC TCO2: 35 MMOL/L (ref 23–27)
POC TCO2: 38 MMOL/L (ref 23–27)
POCT GLUCOSE: 116 MG/DL (ref 70–110)
POCT GLUCOSE: 120 MG/DL (ref 70–110)
POCT GLUCOSE: 126 MG/DL (ref 70–110)
POCT GLUCOSE: 126 MG/DL (ref 70–110)
POCT GLUCOSE: 131 MG/DL (ref 70–110)
POCT GLUCOSE: 137 MG/DL (ref 70–110)
POCT GLUCOSE: 138 MG/DL (ref 70–110)
POCT GLUCOSE: 138 MG/DL (ref 70–110)
POCT GLUCOSE: 140 MG/DL (ref 70–110)
POCT GLUCOSE: 142 MG/DL (ref 70–110)
POCT GLUCOSE: 142 MG/DL (ref 70–110)
POCT GLUCOSE: 143 MG/DL (ref 70–110)
POCT GLUCOSE: 144 MG/DL (ref 70–110)
POCT GLUCOSE: 150 MG/DL (ref 70–110)
POCT GLUCOSE: 150 MG/DL (ref 70–110)
POCT GLUCOSE: 153 MG/DL (ref 70–110)
POCT GLUCOSE: 155 MG/DL (ref 70–110)
POCT GLUCOSE: 158 MG/DL (ref 70–110)
POCT GLUCOSE: 166 MG/DL (ref 70–110)
POCT GLUCOSE: 178 MG/DL (ref 70–110)
POCT GLUCOSE: 181 MG/DL (ref 70–110)
POTASSIUM BLD-SCNC: 2.9 MMOL/L (ref 3.5–5.1)
POTASSIUM BLD-SCNC: 2.9 MMOL/L (ref 3.5–5.1)
POTASSIUM BLD-SCNC: 3.2 MMOL/L (ref 3.5–5.1)
POTASSIUM SERPL-SCNC: 3.4 MMOL/L
POTASSIUM SERPL-SCNC: 3.4 MMOL/L
POTASSIUM SERPL-SCNC: 3.5 MMOL/L
POTASSIUM SERPL-SCNC: 3.9 MMOL/L
POTASSIUM SERPL-SCNC: 3.9 MMOL/L
POTASSIUM SERPL-SCNC: 4.1 MMOL/L
PREALB SERPL-MCNC: 14 MG/DL
PROT SERPL-MCNC: 6.2 G/DL
PROTHROMBIN TIME: 11.5 SEC
PROTHROMBIN TIME: 11.6 SEC
PROTHROMBIN TIME: 11.8 SEC
PROTHROMBIN TIME: 12.3 SEC
PROTHROMBIN TIME: 12.5 SEC
PS: 10
RBC # BLD AUTO: 3.05 M/UL
RBC # BLD AUTO: 3.16 M/UL
RBC # BLD AUTO: 3.3 M/UL
RBC # BLD AUTO: 3.31 M/UL
RBC # BLD AUTO: 3.6 M/UL
RBC # BLD AUTO: 3.64 M/UL
SAMPLE: ABNORMAL
SITE: ABNORMAL
SODIUM BLD-SCNC: 134 MMOL/L (ref 136–145)
SODIUM BLD-SCNC: 134 MMOL/L (ref 136–145)
SODIUM BLD-SCNC: 135 MMOL/L (ref 136–145)
SODIUM SERPL-SCNC: 134 MMOL/L
SODIUM SERPL-SCNC: 136 MMOL/L
SP02: 100
VT: 550
WBC # BLD AUTO: 12.6 K/UL
WBC # BLD AUTO: 13.09 K/UL
WBC # BLD AUTO: 13.44 K/UL
WBC # BLD AUTO: 13.71 K/UL
WBC # BLD AUTO: 14.22 K/UL
WBC # BLD AUTO: 14.49 K/UL

## 2018-03-09 PROCEDURE — 25000003 PHARM REV CODE 250: Performed by: STUDENT IN AN ORGANIZED HEALTH CARE EDUCATION/TRAINING PROGRAM

## 2018-03-09 PROCEDURE — 80048 BASIC METABOLIC PNL TOTAL CA: CPT | Mod: 91

## 2018-03-09 PROCEDURE — 83880 ASSAY OF NATRIURETIC PEPTIDE: CPT

## 2018-03-09 PROCEDURE — 36000709 HC OR TIME LEV III EA ADD 15 MIN: Performed by: THORACIC SURGERY (CARDIOTHORACIC VASCULAR SURGERY)

## 2018-03-09 PROCEDURE — 63600175 PHARM REV CODE 636 W HCPCS: Performed by: THORACIC SURGERY (CARDIOTHORACIC VASCULAR SURGERY)

## 2018-03-09 PROCEDURE — 63600175 PHARM REV CODE 636 W HCPCS: Performed by: STUDENT IN AN ORGANIZED HEALTH CARE EDUCATION/TRAINING PROGRAM

## 2018-03-09 PROCEDURE — D9220A PRA ANESTHESIA: Mod: ,,, | Performed by: ANESTHESIOLOGY

## 2018-03-09 PROCEDURE — 27000248 HC VAD-ADDITIONAL DAY

## 2018-03-09 PROCEDURE — 3E1Y38Z IRRIGATION OF PERICARDIAL CAVITY USING IRRIGATING SUBSTANCE, PERCUTANEOUS APPROACH: ICD-10-PCS | Performed by: THORACIC SURGERY (CARDIOTHORACIC VASCULAR SURGERY)

## 2018-03-09 PROCEDURE — 83735 ASSAY OF MAGNESIUM: CPT

## 2018-03-09 PROCEDURE — 27000202 HC IABP, ADD'L DAY

## 2018-03-09 PROCEDURE — 85520 HEPARIN ASSAY: CPT

## 2018-03-09 PROCEDURE — 63600367 HC NITRIC OXIDE PER HOUR

## 2018-03-09 PROCEDURE — 86140 C-REACTIVE PROTEIN: CPT

## 2018-03-09 PROCEDURE — 84100 ASSAY OF PHOSPHORUS: CPT | Mod: 91

## 2018-03-09 PROCEDURE — 80076 HEPATIC FUNCTION PANEL: CPT

## 2018-03-09 PROCEDURE — 99233 SBSQ HOSP IP/OBS HIGH 50: CPT | Mod: ,,, | Performed by: INTERNAL MEDICINE

## 2018-03-09 PROCEDURE — 37799 UNLISTED PX VASCULAR SURGERY: CPT

## 2018-03-09 PROCEDURE — 93750 INTERROGATION VAD IN PERSON: CPT | Mod: ,,, | Performed by: INTERNAL MEDICINE

## 2018-03-09 PROCEDURE — 99233 SBSQ HOSP IP/OBS HIGH 50: CPT | Mod: ,,, | Performed by: ANESTHESIOLOGY

## 2018-03-09 PROCEDURE — 85027 COMPLETE CBC AUTOMATED: CPT

## 2018-03-09 PROCEDURE — A4216 STERILE WATER/SALINE, 10 ML: HCPCS | Performed by: THORACIC SURGERY (CARDIOTHORACIC VASCULAR SURGERY)

## 2018-03-09 PROCEDURE — C1729 CATH, DRAINAGE: HCPCS | Performed by: THORACIC SURGERY (CARDIOTHORACIC VASCULAR SURGERY)

## 2018-03-09 PROCEDURE — 20000000 HC ICU ROOM

## 2018-03-09 PROCEDURE — 37000008 HC ANESTHESIA 1ST 15 MINUTES: Performed by: THORACIC SURGERY (CARDIOTHORACIC VASCULAR SURGERY)

## 2018-03-09 PROCEDURE — 27201423 OPTIME MED/SURG SUP & DEVICES STERILE SUPPLY: Performed by: THORACIC SURGERY (CARDIOTHORACIC VASCULAR SURGERY)

## 2018-03-09 PROCEDURE — 33999 UNLISTED PX CARDIAC SURGERY: CPT | Mod: ,,, | Performed by: THORACIC SURGERY (CARDIOTHORACIC VASCULAR SURGERY)

## 2018-03-09 PROCEDURE — 93284 PRGRMG EVAL IMPLANTABLE DFB: CPT

## 2018-03-09 PROCEDURE — 93005 ELECTROCARDIOGRAM TRACING: CPT

## 2018-03-09 PROCEDURE — 25000003 PHARM REV CODE 250: Performed by: THORACIC SURGERY (CARDIOTHORACIC VASCULAR SURGERY)

## 2018-03-09 PROCEDURE — 82803 BLOOD GASES ANY COMBINATION: CPT

## 2018-03-09 PROCEDURE — 94003 VENT MGMT INPAT SUBQ DAY: CPT

## 2018-03-09 PROCEDURE — 93284 PRGRMG EVAL IMPLANTABLE DFB: CPT | Mod: 26,,, | Performed by: INTERNAL MEDICINE

## 2018-03-09 PROCEDURE — 83605 ASSAY OF LACTIC ACID: CPT

## 2018-03-09 PROCEDURE — 27000239 HC STAND-BY BYPASS PUMP

## 2018-03-09 PROCEDURE — P9045 ALBUMIN (HUMAN), 5%, 250 ML: HCPCS | Mod: JG | Performed by: STUDENT IN AN ORGANIZED HEALTH CARE EDUCATION/TRAINING PROGRAM

## 2018-03-09 PROCEDURE — 99233 SBSQ HOSP IP/OBS HIGH 50: CPT | Mod: ,,, | Performed by: NURSE PRACTITIONER

## 2018-03-09 PROCEDURE — 36000708 HC OR TIME LEV III 1ST 15 MIN: Performed by: THORACIC SURGERY (CARDIOTHORACIC VASCULAR SURGERY)

## 2018-03-09 PROCEDURE — 02WA0QZ REVISION OF IMPLANTABLE HEART ASSIST SYSTEM IN HEART, OPEN APPROACH: ICD-10-PCS | Performed by: THORACIC SURGERY (CARDIOTHORACIC VASCULAR SURGERY)

## 2018-03-09 PROCEDURE — 88300 SURGICAL PATH GROSS: CPT | Mod: 26,,, | Performed by: PATHOLOGY

## 2018-03-09 PROCEDURE — 97802 MEDICAL NUTRITION INDIV IN: CPT

## 2018-03-09 PROCEDURE — C9113 INJ PANTOPRAZOLE SODIUM, VIA: HCPCS | Performed by: THORACIC SURGERY (CARDIOTHORACIC VASCULAR SURGERY)

## 2018-03-09 PROCEDURE — 27000221 HC OXYGEN, UP TO 24 HOURS

## 2018-03-09 PROCEDURE — 99900026 HC AIRWAY MAINTENANCE (STAT)

## 2018-03-09 PROCEDURE — 94761 N-INVAS EAR/PLS OXIMETRY MLT: CPT

## 2018-03-09 PROCEDURE — 99900035 HC TECH TIME PER 15 MIN (STAT)

## 2018-03-09 PROCEDURE — 63600175 PHARM REV CODE 636 W HCPCS: Performed by: ANESTHESIOLOGY

## 2018-03-09 PROCEDURE — 85007 BL SMEAR W/DIFF WBC COUNT: CPT

## 2018-03-09 PROCEDURE — 85730 THROMBOPLASTIN TIME PARTIAL: CPT | Mod: 91

## 2018-03-09 PROCEDURE — 88300 SURGICAL PATH GROSS: CPT | Performed by: PATHOLOGY

## 2018-03-09 PROCEDURE — 37000009 HC ANESTHESIA EA ADD 15 MINS: Performed by: THORACIC SURGERY (CARDIOTHORACIC VASCULAR SURGERY)

## 2018-03-09 PROCEDURE — 85025 COMPLETE CBC W/AUTO DIFF WBC: CPT | Mod: 91

## 2018-03-09 PROCEDURE — 85610 PROTHROMBIN TIME: CPT

## 2018-03-09 RX ORDER — ALBUMIN HUMAN 50 G/1000ML
25 SOLUTION INTRAVENOUS ONCE
Status: COMPLETED | OUTPATIENT
Start: 2018-03-09 | End: 2018-03-09

## 2018-03-09 RX ORDER — FENTANYL CITRATE 50 UG/ML
INJECTION, SOLUTION INTRAMUSCULAR; INTRAVENOUS
Status: DISCONTINUED | OUTPATIENT
Start: 2018-03-09 | End: 2018-03-09

## 2018-03-09 RX ORDER — ACETAMINOPHEN 10 MG/ML
1000 INJECTION, SOLUTION INTRAVENOUS ONCE
Status: COMPLETED | OUTPATIENT
Start: 2018-03-09 | End: 2018-03-09

## 2018-03-09 RX ORDER — CEFAZOLIN SODIUM 1 G/3ML
INJECTION, POWDER, FOR SOLUTION INTRAMUSCULAR; INTRAVENOUS
Status: DISCONTINUED | OUTPATIENT
Start: 2018-03-09 | End: 2018-03-09

## 2018-03-09 RX ORDER — PROTAMINE SULFATE 10 MG/ML
INJECTION, SOLUTION INTRAVENOUS
Status: DISCONTINUED | OUTPATIENT
Start: 2018-03-09 | End: 2018-03-09

## 2018-03-09 RX ORDER — HYDROCODONE BITARTRATE AND ACETAMINOPHEN 500; 5 MG/1; MG/1
TABLET ORAL
Status: DISCONTINUED | OUTPATIENT
Start: 2018-03-09 | End: 2018-03-12

## 2018-03-09 RX ORDER — FUROSEMIDE 10 MG/ML
10 INJECTION INTRAMUSCULAR; INTRAVENOUS ONCE
Status: COMPLETED | OUTPATIENT
Start: 2018-03-09 | End: 2018-03-09

## 2018-03-09 RX ORDER — MEPERIDINE HYDROCHLORIDE 50 MG/ML
INJECTION INTRAMUSCULAR; INTRAVENOUS; SUBCUTANEOUS
Status: DISPENSED
Start: 2018-03-09 | End: 2018-03-10

## 2018-03-09 RX ORDER — POTASSIUM CHLORIDE 29.8 MG/ML
40 INJECTION INTRAVENOUS ONCE
Status: COMPLETED | OUTPATIENT
Start: 2018-03-09 | End: 2018-03-09

## 2018-03-09 RX ORDER — ROCURONIUM BROMIDE 10 MG/ML
INJECTION, SOLUTION INTRAVENOUS
Status: DISCONTINUED | OUTPATIENT
Start: 2018-03-09 | End: 2018-03-09

## 2018-03-09 RX ORDER — BACITRACIN 50000 [IU]/1
INJECTION, POWDER, FOR SOLUTION INTRAMUSCULAR
Status: DISCONTINUED | OUTPATIENT
Start: 2018-03-09 | End: 2018-03-09 | Stop reason: HOSPADM

## 2018-03-09 RX ORDER — POTASSIUM CHLORIDE 14.9 MG/ML
20 INJECTION INTRAVENOUS ONCE
Status: COMPLETED | OUTPATIENT
Start: 2018-03-09 | End: 2018-03-09

## 2018-03-09 RX ORDER — MEPERIDINE HYDROCHLORIDE 50 MG/ML
50 INJECTION INTRAMUSCULAR; INTRAVENOUS; SUBCUTANEOUS ONCE
Status: COMPLETED | OUTPATIENT
Start: 2018-03-09 | End: 2018-03-09

## 2018-03-09 RX ORDER — MIDAZOLAM HYDROCHLORIDE 1 MG/ML
INJECTION, SOLUTION INTRAMUSCULAR; INTRAVENOUS
Status: DISCONTINUED | OUTPATIENT
Start: 2018-03-09 | End: 2018-03-09

## 2018-03-09 RX ADMIN — ROCURONIUM BROMIDE 50 MG: 10 INJECTION, SOLUTION INTRAVENOUS at 08:03

## 2018-03-09 RX ADMIN — Medication 3 ML: at 02:03

## 2018-03-09 RX ADMIN — PANTOPRAZOLE SODIUM 40 MG: 40 INJECTION, POWDER, FOR SOLUTION INTRAVENOUS at 10:03

## 2018-03-09 RX ADMIN — PROPOFOL 20 MCG/KG/MIN: 10 INJECTION, EMULSION INTRAVENOUS at 04:03

## 2018-03-09 RX ADMIN — NICARDIPINE HYDROCHLORIDE 1 MG/HR: 0.2 INJECTION, SOLUTION INTRAVENOUS at 03:03

## 2018-03-09 RX ADMIN — SUGAMMADEX 500 MG: 100 INJECTION, SOLUTION INTRAVENOUS at 09:03

## 2018-03-09 RX ADMIN — POTASSIUM CHLORIDE 20 MEQ: 200 INJECTION, SOLUTION INTRAVENOUS at 09:03

## 2018-03-09 RX ADMIN — FENTANYL CITRATE 25 MCG: 50 INJECTION, SOLUTION INTRAMUSCULAR; INTRAVENOUS at 10:03

## 2018-03-09 RX ADMIN — CEFEPIME 1 G: 1 INJECTION, POWDER, FOR SOLUTION INTRAMUSCULAR; INTRAVENOUS at 12:03

## 2018-03-09 RX ADMIN — MAGNESIUM SULFATE HEPTAHYDRATE 1 G: 500 INJECTION, SOLUTION INTRAMUSCULAR; INTRAVENOUS at 03:03

## 2018-03-09 RX ADMIN — PROTAMINE SULFATE 55 MG: 10 INJECTION, SOLUTION INTRAVENOUS at 08:03

## 2018-03-09 RX ADMIN — PROPOFOL 20 MCG/KG/MIN: 10 INJECTION, EMULSION INTRAVENOUS at 08:03

## 2018-03-09 RX ADMIN — CEFEPIME 1 G: 1 INJECTION, POWDER, FOR SOLUTION INTRAMUSCULAR; INTRAVENOUS at 11:03

## 2018-03-09 RX ADMIN — FENTANYL CITRATE 25 MCG: 50 INJECTION, SOLUTION INTRAMUSCULAR; INTRAVENOUS at 08:03

## 2018-03-09 RX ADMIN — EPINEPHRINE 0.08 MCG/KG/MIN: 1 INJECTION INTRAMUSCULAR; INTRAVENOUS; SUBCUTANEOUS at 06:03

## 2018-03-09 RX ADMIN — MAGNESIUM SULFATE HEPTAHYDRATE 1 G: 500 INJECTION, SOLUTION INTRAMUSCULAR; INTRAVENOUS at 12:03

## 2018-03-09 RX ADMIN — FUROSEMIDE 5 MG/HR: 10 INJECTION, SOLUTION INTRAMUSCULAR; INTRAVENOUS at 01:03

## 2018-03-09 RX ADMIN — NICARDIPINE HYDROCHLORIDE 1 MG/HR: 0.2 INJECTION, SOLUTION INTRAVENOUS at 02:03

## 2018-03-09 RX ADMIN — FENTANYL CITRATE 300 MCG: 50 INJECTION, SOLUTION INTRAMUSCULAR; INTRAVENOUS at 07:03

## 2018-03-09 RX ADMIN — MUPIROCIN: 20 OINTMENT TOPICAL at 08:03

## 2018-03-09 RX ADMIN — PROPOFOL 10 MCG/KG/MIN: 10 INJECTION, EMULSION INTRAVENOUS at 12:03

## 2018-03-09 RX ADMIN — DOBUTAMINE IN DEXTROSE 4 MCG/KG/MIN: 200 INJECTION, SOLUTION INTRAVENOUS at 08:03

## 2018-03-09 RX ADMIN — CEFAZOLIN 2 G: 330 INJECTION, POWDER, FOR SOLUTION INTRAMUSCULAR; INTRAVENOUS at 08:03

## 2018-03-09 RX ADMIN — FUROSEMIDE 10 MG: 10 INJECTION, SOLUTION INTRAMUSCULAR; INTRAVENOUS at 01:03

## 2018-03-09 RX ADMIN — NICARDIPINE HYDROCHLORIDE 2.5 MG/HR: 0.2 INJECTION, SOLUTION INTRAVENOUS at 01:03

## 2018-03-09 RX ADMIN — MIDAZOLAM HYDROCHLORIDE 2 MG: 1 INJECTION, SOLUTION INTRAMUSCULAR; INTRAVENOUS at 07:03

## 2018-03-09 RX ADMIN — FENTANYL CITRATE 200 MCG: 50 INJECTION, SOLUTION INTRAMUSCULAR; INTRAVENOUS at 09:03

## 2018-03-09 RX ADMIN — PROPOFOL 35 MCG/KG/MIN: 10 INJECTION, EMULSION INTRAVENOUS at 11:03

## 2018-03-09 RX ADMIN — PROTAMINE SULFATE 5 MG: 10 INJECTION, SOLUTION INTRAVENOUS at 08:03

## 2018-03-09 RX ADMIN — EPINEPHRINE 0.06 MCG/KG/MIN: 1 INJECTION PARENTERAL at 07:03

## 2018-03-09 RX ADMIN — AMIODARONE HYDROCHLORIDE 1 MG/MIN: 1.8 INJECTION, SOLUTION INTRAVENOUS at 02:03

## 2018-03-09 RX ADMIN — AMIODARONE HYDROCHLORIDE 1 MG/MIN: 1.8 INJECTION, SOLUTION INTRAVENOUS at 04:03

## 2018-03-09 RX ADMIN — POTASSIUM CHLORIDE 40 MEQ: 400 INJECTION, SOLUTION INTRAVENOUS at 04:03

## 2018-03-09 RX ADMIN — ALBUMIN (HUMAN) 25 G: 12.5 SOLUTION INTRAVENOUS at 02:03

## 2018-03-09 RX ADMIN — ACETAZOLAMIDE 500 MG: 500 INJECTION, POWDER, LYOPHILIZED, FOR SOLUTION INTRAVENOUS at 04:03

## 2018-03-09 RX ADMIN — MEPERIDINE HYDROCHLORIDE 50 MG: 50 INJECTION, SOLUTION INTRAMUSCULAR; INTRAVENOUS; SUBCUTANEOUS at 02:03

## 2018-03-09 RX ADMIN — Medication 0.02 MCG/KG/MIN: at 12:03

## 2018-03-09 RX ADMIN — MUPIROCIN: 20 OINTMENT TOPICAL at 09:03

## 2018-03-09 RX ADMIN — Medication 3 ML: at 10:03

## 2018-03-09 RX ADMIN — ACETAMINOPHEN 1000 MG: 10 INJECTION, SOLUTION INTRAVENOUS at 04:03

## 2018-03-09 RX ADMIN — SODIUM CHLORIDE, SODIUM GLUCONATE, SODIUM ACETATE, POTASSIUM CHLORIDE, MAGNESIUM CHLORIDE, SODIUM PHOSPHATE, DIBASIC, AND POTASSIUM PHOSPHATE: .53; .5; .37; .037; .03; .012; .00082 INJECTION, SOLUTION INTRAVENOUS at 06:03

## 2018-03-09 RX ADMIN — Medication 3 ML: at 05:03

## 2018-03-09 RX ADMIN — AMIODARONE HYDROCHLORIDE 0.5 MG/MIN: 1.8 INJECTION, SOLUTION INTRAVENOUS at 08:03

## 2018-03-09 RX ADMIN — AMIODARONE HYDROCHLORIDE 0.5 MG/MIN: 1.8 INJECTION, SOLUTION INTRAVENOUS at 11:03

## 2018-03-09 RX ADMIN — ROCURONIUM BROMIDE 50 MG: 10 INJECTION, SOLUTION INTRAVENOUS at 07:03

## 2018-03-09 RX ADMIN — PROPOFOL 20 MCG/KG/MIN: 10 INJECTION, EMULSION INTRAVENOUS at 01:03

## 2018-03-09 RX ADMIN — EPINEPHRINE 0.08 MCG/KG/MIN: 1 INJECTION PARENTERAL at 03:03

## 2018-03-09 RX ADMIN — FLUCONAZOLE 400 MG: 2 INJECTION INTRAVENOUS at 09:03

## 2018-03-09 RX ADMIN — NOREPINEPHRINE BITARTRATE 0.06 MCG/KG/MIN: 1 INJECTION, SOLUTION, CONCENTRATE INTRAVENOUS at 06:03

## 2018-03-09 RX ADMIN — FENTANYL CITRATE 25 MCG: 50 INJECTION, SOLUTION INTRAMUSCULAR; INTRAVENOUS at 04:03

## 2018-03-09 NOTE — PROGRESS NOTES
Dr. Kaufman notified of uop, cvp, gtts, rate, lab results, vad profile, chest tube output, changed vad dsg due dsg saturated with blood.

## 2018-03-09 NOTE — PROGRESS NOTES
Cardene turned off and levo restarted. Pt. MAP's with cardene we 74-75 but CI and CO were lower. Levo restarted at .02 and then increased to .04 which was his original dose. CI and CO increased back to original numbers about 2.7/6.7. MAPs ranging from 77-79. Will continue to monitor

## 2018-03-09 NOTE — PROGRESS NOTES
Patient intubated and sedated with no family at bedside. VAD interrogation completed this afternoon in the event changes needed to be made. Will continue to monitor for further issues.     IABP d/c'd today. Chest open, hopeful for closure tomorrow. Levo OFF. Epi weaned to 0.06. Cardene and Amio started today.   Hopeful to see wife on Monday to review documenting in binder.    Pulsatile: Yes   VAD Sounds: Smooth  Problems / Issues / Alarms with VAD if any: None noted      VAD Interrogation:  TXP LVAD INTERROGATIONS 3/9/2018 3/9/2018 3/9/2018 3/9/2018 3/9/2018 3/9/2018 3/9/2018   Type HeartMate II HeartMate II HeartMate II HeartMate II HeartMate II HeartMate II HeartMate II   Flow 5.2 5.1 5.0 5.5 5.0 5.0 5.5   Speed 9000 9000 9000 9400 9400 9400 9400   PI 5.4 5.6 5.6 5.1 5.5 5.8 5.3   Power ([place]) 5.3 5.3 5.1 5.1 5.1 5.7 6.0   LSL 8800 8800 8800 9000 9000 9000 9000   Pulsatility Pulse Pulse Pulse Pulse Pulse Pulse -   }

## 2018-03-09 NOTE — PROGRESS NOTES
"[facility]  Endocrinology  Progress Note    Admit Date: 3/1/2018     Reason for Consult: Management of Hyperglycemia     Surgical Procedure and Date: 3/8/18 - LVAD placement; 3/9/18 s/p exp lap  Lab Results   Component Value Date    HGBA1C 5.5 03/08/2018       HPI: Patient is a 52 y.o. male with a diagnosis of CHF since 2011 (ICD placement with multiple interrogations) admitted in January for advanced heart failure options. Admitted with decompensated acute heart failure, elevated creatinine. Underwent LVAD 3/8/18.    Endocrinology has been consulted to assist in post op hyperglycemia.        Interval HPI:   Overnight events: remains in ICU, intubated, IABP in place, to OR this AM for chest closure but underwent exp lap, possible chest closure tomorrow. BG well cotnrolled overnight on IV insulin infusion protocol.   Eating:   NPO  Hypoglycemia and intervention: No  Fever: No  TPN and/or TF: No    BP (!) 84/0 (BP Location: Left arm, Patient Position: Lying)   Pulse (!) 112   Temp 100 °F (37.8 °C) (Core ([place]))   Resp 20   Ht 6' 1" (1.854 m)   Wt 115.9 kg (255 lb 8.2 oz)   SpO2 100%   BMI 33.71 kg/m²       Labs Reviewed and Include      Recent Labs  Lab 03/09/18  0308   *   CALCIUM 9.5   ALBUMIN 3.6   PROT 6.2      K 3.4*   CO2 25   CL 99   BUN 23*   CREATININE 2.2*   ALKPHOS 55   ALT 27   AST 89*   BILITOT 1.4*     Lab Results   Component Value Date    WBC 12.60 03/09/2018    HGB 10.0 (L) 03/09/2018    HCT 30.4 (L) 03/09/2018    MCV 84 03/09/2018     (L) 03/09/2018     No results for input(s): TSH, FREET4 in the last 168 hours.  Lab Results   Component Value Date    HGBA1C 5.5 03/08/2018     Nutritional status:   Body mass index is 33.71 kg/m².  Lab Results   Component Value Date    ALBUMIN 3.6 03/09/2018    ALBUMIN 3.6 03/08/2018    ALBUMIN 3.4 (L) 03/07/2018     Lab Results   Component Value Date    PREALBUMIN 14 (L) 03/08/2018    PREALBUMIN 29 01/23/2018 "       Estimated Creatinine Clearance: 53 mL/min (A) (based on SCr of 2.2 mg/dL (H)).    Accu-Checks  Recent Labs      03/08/18   2102  03/08/18   2205  03/08/18   2304  03/09/18   0003  03/09/18   0103  03/09/18   0203  03/09/18   0312  03/09/18   0400  03/09/18   0502  03/09/18   0613   POCTGLUCOSE  192*  188*  172*  178*  181*  166*  138*  143*  155*  143*       Current Medications and/or Treatments Impacting Glycemic Control  Pressors:    Autonomic Drugs     Start     Stop Route Frequency Ordered    03/08/18 1445  EPINEPHrine (ADRENALIN) 4 mg in sodium chloride 0.9% 250 mL infusion     Question Answer Comment   Titrate by: (in mcg/kg/min) 0.01    Titrate interval: (in minutes) 5    Titrate to maintain: (SBP or MAP or Cardiac Index) MAP    Greater than: (in mmHg) 60    Maximum dose: (in mcg/kg/min) 2        -- IV Continuous 03/08/18 1347    03/08/18 1445  norepinephrine 4 mg in dextrose 5% 250 mL infusion (premix) (titrating)     Question Answer Comment   Titrate by: (in mcg/kg/min) 0.01    Titrate interval: (in minutes) 5    Titrate to maintain: (MAP or SBP) MAP    Greater than: (in mmHg) 60    Maximum dose: (in mcg/kg/min) 3        -- IV Continuous 03/08/18 1347        Hyperglycemia/Diabetes Medications: Antihyperglycemics     Start     Stop Route Frequency Ordered    03/08/18 1445  insulin regular (Humulin R) 100 Units in sodium chloride 0.9% 100 mL infusion      -- IV Continuous 03/08/18 1347          ASSESSMENT and PLAN    * Acute decompensated heart failure    S/p LVAD this admission  Per HTS; avoid hypoglycemia          Hyperglycemia    BG goal 140-180 while inpatient    Will continue IV intensive protocol requiring intensive BG monitoring q1hr.   Patient does not have history of DM.  Will likely step down quickly once clinically more stable.          Acute on chronic combined systolic and diastolic heart failure    S/p LVAD this admission  Per HTS; avoid hypoglycemia          CKD (chronic kidney disease),  stage III    Estimated Creatinine Clearance: 53 mL/min (A) (based on SCr of 2.2 mg/dL (H)).  avoid hypoglycemia  Caution with insulin stacking        LVAD (left ventricular assist device) present    Per HTS; avoid hypoglycemia            JEM Mcgovern,ANP-C  Endocrinology  [facility]

## 2018-03-09 NOTE — ASSESSMENT & PLAN NOTE
-NICM  -Last 2D Echo 3/5/18: LVEF 5-10%, LVEDD 9.7 cm   - Inotropes and IABP pre VAD   -Heart failure pathway has been completed He was approved for VAD as DT on 3/8/18. Denied  the patient to transplant at this time due to elevated PA pressures and recent smoking.     -2g Na dietary restriction, 1500 mL fluid restriction, strict I/Os

## 2018-03-09 NOTE — ASSESSMENT & PLAN NOTE
-HeartMate II Implanted 3/8/18 as DT with repositioning of outflow graft 3/9/18.  Anticipate chest closure tomorrow  -CTS Primary  -Implanted by Dr. Kaufman  -Anticoagulation per Primary Coumadin  -Speed set at 9200, LSL 8800 rpm  -Interrogation notable for no events  -Not listed for OHTx  - On Levo, Epi,  and NO

## 2018-03-09 NOTE — ASSESSMENT & PLAN NOTE
Contributing Nutrition Diagnosis  Increased nutrient needs    Related to (etiology):   Physiological causes    Signs and Symptoms (as evidenced by):   S/p LVAD    Nutrition Diagnosis Status:   New

## 2018-03-09 NOTE — SUBJECTIVE & OBJECTIVE
Interval History: Intubated, sedated, chest open. S/P HM II; went back to OR this morning for Repositioning of outflow graft.     Continuous Infusions:   sodium chloride 0.9%      dextrose 5 % and 0.45 % NaCl with KCl 40 mEq      DOBUTamine 4 mcg/kg/min (03/09/18 1100)    epinephrine infusion 0.07 mcg/kg/min (03/09/18 1100)    furosemide (LASIX) 1 mg/mL infusion (non-titrating)      insulin (HUMAN R) infusion (adults) 1.7 Units/hr (03/09/18 1100)    nicardipine Stopped (03/09/18 0240)    nitric oxide gas      norepinephrine bitartrate-D5W 0.04 mcg/kg/min (03/09/18 1100)    propofol 20 mcg/kg/min (03/09/18 1100)     Scheduled Meds:   ceFEPime (MAXIPIME) IVPB  1 g Intravenous Q12H    docusate sodium  200 mg Oral QHS    ferrous gluconate  324 mg Oral Daily with breakfast    fluconazole (DIFLUCAN) IVPB  400 mg Intravenous Q24H    furosemide  10 mg Intravenous Once    magnesium sulfate IVPB  1 g Intravenous Once    mupirocin   Nasal BID    pantoprazole  40 mg Oral Daily    pantoprazole  40 mg Intravenous Daily    potassium chloride  40 mEq Intravenous Once    sodium chloride 0.9%  3 mL Intravenous Q8H    sugammadex         PRN Meds:sodium chloride, albumin human 5%, albuterol sulfate, bisacodyl, dextrose 50%, dextrose 50%, fentaNYL, magnesium hydroxide 400 mg/5 ml, oxyCODONE, oxyCODONE    Review of patient's allergies indicates:   Allergen Reactions    Lisinopril Anaphylaxis     Objective:     Vital Signs (Most Recent):  Temp: 99.4 °F (37.4 °C) (03/09/18 1100)  Pulse: 103 (03/09/18 1145)  Resp: 18 (03/09/18 1145)  BP: 108/65 (03/09/18 1015)  SpO2: 100 % (03/09/18 0613) Vital Signs (24h Range):  Temp:  [98.2 °F (36.8 °C)-100.5 °F (38.1 °C)] 99.4 °F (37.4 °C)  Pulse:  [] 103  Resp:  [6-21] 18  SpO2:  [95 %-100 %] 100 %  BP: ()/(0-73) 108/65  Arterial Line BP: ()/(33-70) 112/54     Patient Vitals for the past 72 hrs (Last 3 readings):   Weight   03/09/18 0333 115.9 kg (255 lb 8.2 oz)    03/07/18 0701 124 kg (273 lb 5.9 oz)   03/06/18 1901 124 kg (273 lb 5.9 oz)     Body mass index is 33.71 kg/m².      Intake/Output Summary (Last 24 hours) at 03/09/18 1246  Last data filed at 03/09/18 1100   Gross per 24 hour   Intake             5958 ml   Output             4106 ml   Net             1852 ml     Hemodynamic Parameters:    Physical Exam   Constitutional: He appears well-developed and well-nourished. No distress.   Intubated and sedated   HENT:   Head: Normocephalic and atraumatic.   Neck: Normal range of motion. No JVD present.   Cardiovascular: Normal rate.  Exam reveals no friction rub.    No murmur heard.  Chest open   Pulmonary/Chest: Effort normal. No respiratory distress. He has no wheezes.   Abdominal: Soft. He exhibits no distension.   Musculoskeletal: Normal range of motion. He exhibits no edema.   Neurological: He is alert.   Skin: Skin is warm. Capillary refill takes 2 to 3 seconds. He is not diaphoretic. No erythema.   L CFA site clean and dry, no hematoma noted.   Psychiatric: He has a normal mood and affect. His behavior is normal. Judgment and thought content normal.     Significant Labs:  CBC:    Recent Labs  Lab 03/08/18 2359 03/09/18  0308 03/09/18  0947   WBC 13.44* 12.60 14.49*   RBC 3.64* 3.60* 3.31*   HGB 10.3* 10.0* 9.2*   HCT 30.9* 30.4* 28.1*   * 120* 107*   MCV 85 84 85   MCH 28.3 27.8 27.8   MCHC 33.3 32.9 32.7     BNP:    Recent Labs  Lab 03/05/18  0820 03/07/18  0258 03/09/18  0308   BNP 1,270* 1,110* 1,114*     CMP:    Recent Labs  Lab 03/07/18  1524 03/08/18  0212  03/08/18  2359 03/09/18  0308 03/09/18  0947   GLU 96 143*  < > 188* 149* 153*   CALCIUM 9.5 10.0  < > 9.2 9.5 8.7   ALBUMIN 3.4* 3.6  --   --  3.6  --    PROT 7.0 7.7  --   --  6.2  --     133*  < > 134* 136 136   K 3.6 3.3*  < > 3.4* 3.4* 3.5   CO2 26 29  < > 23 25 24   CL 99 92*  < > 99 99 99   BUN 16 18  < > 25* 23* 23*   CREATININE 1.6* 1.8*  < > 2.3* 2.2* 2.2*   ALKPHOS 85 87  --   --   55  --    ALT 19 27  --   --  27  --    AST 28 33  --   --  89*  --    BILITOT 1.2* 1.2*  --   --  1.4*  --    < > = values in this interval not displayed.   Coagulation:     Recent Labs  Lab 03/08/18  2359 03/09/18  0308 03/09/18  0947   INR 1.2 1.2 1.2   APTT 24.3 25.0 36.3*     LDH:    Recent Labs  Lab 03/08/18  0212        Microbiology:  Microbiology Results (last 7 days)     Procedure Component Value Units Date/Time    IV catheter culture [[id number]] Collected:  03/07/18 1151    Order Status:  Completed Specimen:  Catheter Tip from Catheter Tip, Intrajugular Updated:  03/09/18 0723     Aerobic Culture - Cath tip No growth    Blood culture [[id number]] Collected:  03/06/18 1107    Order Status:  Completed Specimen:  Blood Updated:  03/08/18 1612     Blood Culture, Routine No Growth to date     Blood Culture, Routine No Growth to date     Blood Culture, Routine No Growth to date    Blood culture [[id number]] Collected:  03/06/18 1107    Order Status:  Completed Specimen:  Blood Updated:  03/08/18 1612     Blood Culture, Routine No Growth to date     Blood Culture, Routine No Growth to date     Blood Culture, Routine No Growth to date    Blood culture [[id number]]     Order Status:  Canceled Specimen:  Blood     Blood culture [[id number]]     Order Status:  Canceled Specimen:  Blood           I have reviewed all pertinent labs within the past 24 hours.    Estimated Creatinine Clearance: 53 mL/min (A) (based on SCr of 2.2 mg/dL (H)).    Diagnostic Results:  I have reviewed and interpreted all pertinent imaging results/findings within the past 24 hours.

## 2018-03-09 NOTE — PLAN OF CARE
Problem: Patient Care Overview  Goal: Plan of Care Review  Recommendations  1. When able to extubate, ADAT to Cardiac with texture per SLP.   2. If unable to extubate, RD to provide TF recommendations.   3. CSU RD to follow-up with cardiac and coumadin diet education when appropriate.   RD to monitor.    Goals: Patient to receive nutrition by RD follow-up  Nutrition Goal Status: new    Full assessment completed, see RD Consult Note 3/9/18.

## 2018-03-09 NOTE — PROCEDURES
Patient intubated and sedated on vent.  VAD interrogation completed this AM in the event changes needed to be made. Will continue to monitor for further issues.     Pulsatile: Yes   VAD Sounds: Smooth  Problems / Issues / Alarms with VAD if any: PI events      VAD Interrogation:  TXP LVAD INTERROGATIONS 3/9/2018 3/9/2018 3/9/2018 3/9/2018 3/9/2018 3/9/2018 3/9/2018   Type HeartMate II HeartMate II HeartMate II (No Data) HeartMate II HeartMate II HeartMate II   Flow 5.0 5.5 5.5 - 5.0 4.8 4.9   Speed 9400 9400 9400 - 9400 9400 9400   PI 5.8 5.3 5.3 - 6.5 6.8 6.7   Power ([place]) 5.7 6.0 6.0 - 5.7 5.6 5.6   LSL 9000 9000 9000 - 9000 9000 9000   Pulsatility Pulse - - - Pulse Pulse Pulse   }

## 2018-03-09 NOTE — HOSPITAL COURSE
3/8 - LVAD placement  3/9 - revision of outflow graft, chest remains open  3/10 - chest closure  3/11 - pt extubated, PTX noted  3/12 - 1U PRBC transfused for hgb 6.7. Pt went to IR for chest tube for PTX, canceled due to improving PTX.  3/13 - epi down to 0.02  3/14 - epi off

## 2018-03-09 NOTE — BRIEF OP NOTE
[facility]  Brief Operative Note    SUMMARY     Surgery Date: 3/9/2018     Surgeon(s) and Role:     * Yg Kaufman MD - Primary     * Hossein Segal MD - Resident - Assisting    Pre-op Diagnosis:  Heart failure, unspecified heart failure chronicity, unspecified heart failure type [I50.9]    Post-op Diagnosis:  Post-Op Diagnosis Codes:     * Heart failure, unspecified heart failure chronicity, unspecified heart failure type [I50.9]    Procedure(s):  REVISION    Anesthesia: General    Description of Procedure: Replacement of outflow graft.  This allowed Heartmate 2 pump to sit better in pocket and for outflow graft to no longer be routed over RV.    Description of the findings of the procedure: Temporary chest closure again.  Two chest tubes remain.    Estimated Blood Loss: 200mL         Specimens:   Specimen (12h ago through future)    None        Hossein Segal MD  Thoracic Surgery Resident, PGY6  Cardiothoracic Surgery  [facility]

## 2018-03-09 NOTE — PLAN OF CARE
Problem: Patient Care Overview  Goal: Plan of Care Review  Outcome: Ongoing (interventions implemented as appropriate)  Pt. Remains intubated and sedated. Pt. Awakens and follows commands appropriately with all 4 extremities. Pt. Vitals stable during the night. Tmax was 100.5 for which he was given 1g of tylenol IV. 500 of 5% albumin given during the night for low u/o and elevated lactic acid. 10mg of lasix also given during the night. Lactic acid down to 1.53 this morning. Pt. LVAD numbers consistent and stable within desired range. Minimal sanguinous chest tube output during the night. U/0 ranged from 75 cc/hr to 250 cc/hr. No problems during the night with IABP. Plan is to close pt.  Chest this morning.

## 2018-03-09 NOTE — SUBJECTIVE & OBJECTIVE
Subjective:     Post-Op Info:  Procedure(s):  REVISION   Day of Surgery       Interval History:   Taken to OR today for revision of outflow graft. Still with open chest. Balloon pump pulled. Multiple episodes of vtach, amiodarone started and ICD interrogated.     Medications:  Continuous Infusions:   sodium chloride 0.9%      amiodarone in dextrose 5% 1 mg/min (03/09/18 1410)    amiodarone in dextrose 5%      dextrose 5 % and 0.45 % NaCl with KCl 40 mEq      DOBUTamine 4 mcg/kg/min (03/09/18 1300)    epinephrine infusion 0.07 mcg/kg/min (03/09/18 1300)    furosemide (LASIX) 1 mg/mL infusion (non-titrating) 5 mg/hr (03/09/18 1330)    insulin (HUMAN R) infusion (adults) 1.1 Units/hr (03/09/18 1200)    nicardipine 2.5 mg/hr (03/09/18 1300)    nitric oxide gas      norepinephrine bitartrate-D5W Stopped (03/09/18 1230)    propofol 20 mcg/kg/min (03/09/18 1300)     Scheduled Meds:   amiodarone in dextrose  150 mg Intravenous Once    ceFEPime (MAXIPIME) IVPB  1 g Intravenous Q12H    docusate sodium  200 mg Oral QHS    ferrous gluconate  324 mg Oral Daily with breakfast    fluconazole (DIFLUCAN) IVPB  400 mg Intravenous Q24H    magnesium sulfate IVPB  1 g Intravenous Once    meperidine  50 mg Intravenous Once    mupirocin   Nasal BID    pantoprazole  40 mg Oral Daily    pantoprazole  40 mg Intravenous Daily    potassium chloride  40 mEq Intravenous Once    sodium chloride 0.9%  3 mL Intravenous Q8H    sugammadex         PRN Meds:sodium chloride, albumin human 5%, albuterol sulfate, bisacodyl, dextrose 50%, dextrose 50%, fentaNYL, magnesium hydroxide 400 mg/5 ml, oxyCODONE, oxyCODONE     Objective:     Vital Signs (Most Recent):  Temp: 99.4 °F (37.4 °C) (03/09/18 1100)  Pulse: (!) 119 (03/09/18 1430)  Resp: (!) 8 (03/09/18 1400)  BP: 108/65 (03/09/18 1015)  SpO2: 100 % (03/09/18 0613) Vital Signs (24h Range):  Temp:  [98.2 °F (36.8 °C)-100.5 °F (38.1 °C)] 99.4 °F (37.4 °C)  Pulse:  []  119  Resp:  [6-21] 8  SpO2:  [96 %-100 %] 100 %  BP: ()/(0-73) 108/65  Arterial Line BP: ()/(40-86) 81/74       Intake/Output Summary (Last 24 hours) at 03/09/18 1439  Last data filed at 03/09/18 1300   Gross per 24 hour   Intake             4072 ml   Output             3881 ml   Net              191 ml       Physical Exam   Constitutional: He appears well-developed and well-nourished.   Cardiovascular:   Tachycardic, mechanical hum   Pulmonary/Chest: Effort normal.   ETT in place  Nitric   Chest tubes with serosang output   Abdominal: Soft. He exhibits no distension.   Neurological:   Follows commands when off sedation       Significant Labs:  CBC:   Recent Labs  Lab 03/09/18  1339   WBC 14.22*   RBC 3.30*   HGB 9.3*   HCT 28.0*   *   MCV 85   MCH 28.2   MCHC 33.2     CMP:   Recent Labs  Lab 03/09/18  0308  03/09/18  1339   *  < > 126*   CALCIUM 9.5  < > 9.0   ALBUMIN 3.6  --   --    PROT 6.2  --   --      < > 136   K 3.4*  < > 3.9   CO2 25  < > 25   CL 99  < > 100   BUN 23*  < > 25*   CREATININE 2.2*  < > 2.2*   ALKPHOS 55  --   --    ALT 27  --   --    AST 89*  --   --    BILITOT 1.4*  --   --    < > = values in this interval not displayed.    Significant Diagnostics:  CXR: I have reviewed all pertinent results/findings within the past 24 hours    Procedure: Device Interrogation Including analysis of device parameters  Current Settings: Ventricular Assist Device  Review of device function is stable  TXP LVAD INTERROGATIONS 3/9/2018 3/9/2018 3/9/2018 3/9/2018 3/9/2018 3/9/2018 3/9/2018   Type HeartMate II HeartMate II HeartMate II (No Data) HeartMate II HeartMate II HeartMate II   Flow 5.0 5.5 5.5 - 5.0 4.8 4.9   Speed 9400 9400 9400 - 9400 9400 9400   PI 5.8 5.3 5.3 - 6.5 6.8 6.7   Power ([place]) 5.7 6.0 6.0 - 5.7 5.6 5.6   LSL 9000 9000 9000 - 9000 9000 9000   Pulsatility Pulse - - - Pulse Pulse Pulse

## 2018-03-09 NOTE — TRANSFER OF CARE
"Anesthesia Transfer of Care Note    Patient: Tim Richards    Procedure(s) Performed: Procedure(s):  REVISION    Patient location: ICU    Anesthesia Type: general    Transport from OR: Transported from OR intubated on 100% O2 by AMBU with adequate controlled ventilation. Continuous ECG monitoring in transport. Continuous SpO2 monitoring in transport. Continuos invasive BP monitoring in transport    Post pain: adequate analgesia    Post assessment: no apparent anesthetic complications and tolerated procedure well    Post vital signs: stable    Level of consciousness: sedated    Nausea/Vomiting: no nausea/vomiting    Complications: none    Transfer of care protocol was followed      Last vitals:   Visit Vitals  BP (!) 84/0 (BP Location: Left arm, Patient Position: Lying)   Pulse 110   Temp 37.8 °C (100.1 °F) (Core ([place]))   Resp 20   Ht 6' 1" (1.854 m)   Wt 115.9 kg (255 lb 8.2 oz)   SpO2 100%   BMI 33.71 kg/m²     "

## 2018-03-09 NOTE — PROGRESS NOTES
[facility]  Heart Transplant  Progress Note    Patient Name: Tim Richards  MRN: [id number]  Admission Date: 3/1/2018  Hospital Length of Stay: 8 days  Attending Physician: Yg Kaufman MD  Primary Care Provider: Ja Méndez MD  Principal Problem:Acute decompensated heart failure    Subjective:     Interval History: Intubated, sedated, chest open. S/P HM II; went back to OR this morning for Repositioning of outflow graft.     Continuous Infusions:   sodium chloride 0.9%      dextrose 5 % and 0.45 % NaCl with KCl 40 mEq      DOBUTamine 4 mcg/kg/min (03/09/18 1100)    epinephrine infusion 0.07 mcg/kg/min (03/09/18 1100)    furosemide (LASIX) 1 mg/mL infusion (non-titrating)      insulin (HUMAN R) infusion (adults) 1.7 Units/hr (03/09/18 1100)    nicardipine Stopped (03/09/18 0240)    nitric oxide gas      norepinephrine bitartrate-D5W 0.04 mcg/kg/min (03/09/18 1100)    propofol 20 mcg/kg/min (03/09/18 1100)     Scheduled Meds:   ceFEPime (MAXIPIME) IVPB  1 g Intravenous Q12H    docusate sodium  200 mg Oral QHS    ferrous gluconate  324 mg Oral Daily with breakfast    fluconazole (DIFLUCAN) IVPB  400 mg Intravenous Q24H    furosemide  10 mg Intravenous Once    magnesium sulfate IVPB  1 g Intravenous Once    mupirocin   Nasal BID    pantoprazole  40 mg Oral Daily    pantoprazole  40 mg Intravenous Daily    potassium chloride  40 mEq Intravenous Once    sodium chloride 0.9%  3 mL Intravenous Q8H    sugammadex         PRN Meds:sodium chloride, albumin human 5%, albuterol sulfate, bisacodyl, dextrose 50%, dextrose 50%, fentaNYL, magnesium hydroxide 400 mg/5 ml, oxyCODONE, oxyCODONE    Review of patient's allergies indicates:   Allergen Reactions    Lisinopril Anaphylaxis     Objective:     Vital Signs (Most Recent):  Temp: 99.4 °F (37.4 °C) (03/09/18 1100)  Pulse: 103 (03/09/18 1145)  Resp: 18 (03/09/18 1145)  BP: 108/65 (03/09/18 1015)  SpO2: 100 % (03/09/18 0613)  Vital Signs (24h Range):  Temp:  [98.2 °F (36.8 °C)-100.5 °F (38.1 °C)] 99.4 °F (37.4 °C)  Pulse:  [] 103  Resp:  [6-21] 18  SpO2:  [95 %-100 %] 100 %  BP: ()/(0-73) 108/65  Arterial Line BP: ()/(33-70) 112/54     Patient Vitals for the past 72 hrs (Last 3 readings):   Weight   03/09/18 0333 115.9 kg (255 lb 8.2 oz)   03/07/18 0701 124 kg (273 lb 5.9 oz)   03/06/18 1901 124 kg (273 lb 5.9 oz)     Body mass index is 33.71 kg/m².      Intake/Output Summary (Last 24 hours) at 03/09/18 1246  Last data filed at 03/09/18 1100   Gross per 24 hour   Intake             5958 ml   Output             4106 ml   Net             1852 ml     Hemodynamic Parameters:    Physical Exam   Constitutional: He appears well-developed and well-nourished. No distress.   Intubated and sedated   HENT:   Head: Normocephalic and atraumatic.   Neck: Normal range of motion. No JVD present.   Cardiovascular: Normal rate.  Exam reveals no friction rub.    No murmur heard.  Chest open   Pulmonary/Chest: Effort normal. No respiratory distress. He has no wheezes.   Abdominal: Soft. He exhibits no distension.   Musculoskeletal: Normal range of motion. He exhibits no edema.   Neurological: He is alert.   Skin: Skin is warm. Capillary refill takes 2 to 3 seconds. He is not diaphoretic. No erythema.   L CFA site clean and dry, no hematoma noted.   Psychiatric: He has a normal mood and affect. His behavior is normal. Judgment and thought content normal.     Significant Labs:  CBC:    Recent Labs  Lab 03/08/18  2359 03/09/18  0308 03/09/18  0947   WBC 13.44* 12.60 14.49*   RBC 3.64* 3.60* 3.31*   HGB 10.3* 10.0* 9.2*   HCT 30.9* 30.4* 28.1*   * 120* 107*   MCV 85 84 85   MCH 28.3 27.8 27.8   MCHC 33.3 32.9 32.7     BNP:    Recent Labs  Lab 03/05/18  0820 03/07/18  0258 03/09/18  0308   BNP 1,270* 1,110* 1,114*     CMP:    Recent Labs  Lab 03/07/18  1524 03/08/18  0212  03/08/18  2359 03/09/18  0308 03/09/18  0947   GLU 96 143*  < >  188* 149* 153*   CALCIUM 9.5 10.0  < > 9.2 9.5 8.7   ALBUMIN 3.4* 3.6  --   --  3.6  --    PROT 7.0 7.7  --   --  6.2  --     133*  < > 134* 136 136   K 3.6 3.3*  < > 3.4* 3.4* 3.5   CO2 26 29  < > 23 25 24   CL 99 92*  < > 99 99 99   BUN 16 18  < > 25* 23* 23*   CREATININE 1.6* 1.8*  < > 2.3* 2.2* 2.2*   ALKPHOS 85 87  --   --  55  --    ALT 19 27  --   --  27  --    AST 28 33  --   --  89*  --    BILITOT 1.2* 1.2*  --   --  1.4*  --    < > = values in this interval not displayed.   Coagulation:     Recent Labs  Lab 03/08/18  2359 03/09/18  0308 03/09/18  0947   INR 1.2 1.2 1.2   APTT 24.3 25.0 36.3*     LDH:    Recent Labs  Lab 03/08/18  0212        Microbiology:  Microbiology Results (last 7 days)     Procedure Component Value Units Date/Time    IV catheter culture [[id number]] Collected:  03/07/18 1151    Order Status:  Completed Specimen:  Catheter Tip from Catheter Tip, Intrajugular Updated:  03/09/18 0723     Aerobic Culture - Cath tip No growth    Blood culture [[id number]] Collected:  03/06/18 1107    Order Status:  Completed Specimen:  Blood Updated:  03/08/18 1612     Blood Culture, Routine No Growth to date     Blood Culture, Routine No Growth to date     Blood Culture, Routine No Growth to date    Blood culture [[id number]] Collected:  03/06/18 1107    Order Status:  Completed Specimen:  Blood Updated:  03/08/18 1612     Blood Culture, Routine No Growth to date     Blood Culture, Routine No Growth to date     Blood Culture, Routine No Growth to date    Blood culture [[id number]]     Order Status:  Canceled Specimen:  Blood     Blood culture [[id number]]     Order Status:  Canceled Specimen:  Blood           I have reviewed all pertinent labs within the past 24 hours.    Estimated Creatinine Clearance: 53 mL/min (A) (based on SCr of 2.2 mg/dL (H)).    Diagnostic Results:  I have reviewed and interpreted all pertinent imaging results/findings within the past 24 hours.    Assessment and Plan:      Mr. Richards is a 52 y.o. year old Black or  male who has presents as f/u from hospitalization for ADHF after presenting to clinic 1/10/17 as initial consult. advanced surgical options (LVAD/OHT).    This 51 yo BM has had CHF since 2011 at which time an angiogram did not demonstrate any CAD.  He is on amiodarone for VT. He was admitted from 1/12-1/17/18 (see d/c summary) where he was treated for ADHF and initiation of w/u for advanced options. RHC during that admission showed RA 17 and PCWP 35 as well as severely reduced CI 1.6. Though not optimized, he was discharged per his request so as not to lose his job/insurance--see Dr. Hadley's attestation for full details on d/c summary. Since d/c, his Scr has risen from 2.0 on discharge to 2.8 (1/23/18). His BNP had declined to 1040.  He presents today for scheduled hospital f/u.      Today, he reports feeling well. He says he has more energy than usual. His only complaints are cramping and xerosis. He denies n/v/d, no CP, palpitations or syncope. He has stable orthostatic dizziness/LH. No PND or orthopnea. His COLEMAN is improved from discharge and his weight is down about 5-7 pounds on his home scale. Of note, his Scr on labs from 2 days ago showed Scr 2.8 up from 2.0. T. Bili was down from 1.1 to 0.6 and BNP was down to 1040 from 1700.  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 No labs initially ordered but I ordered and sent him down.     LVAD (left ventricular assist device) present    -HeartMate II Implanted 3/8/18 as DT with repositioning of outflow graft 3/9/18.  Anticipate chest closure tomorrow  -CTS Primary  -Implanted by Dr. Kaufman  -Anticoagulation per Primary Coumadin  -Speed set at 9200, LSL 8800 rpm  -Interrogation notable for no events  -Not listed for OHTx  - On Levo, Epi,  and NO        Acute on chronic combined systolic and diastolic heart failure    -NICM  -Last 2D Echo 3/5/18: LVEF 5-10%, LVEDD 9.7 cm   -  Inotropes and IABP pre VAD   -Heart failure pathway has been completed He was approved for VAD as DT on 3/8/18. Denied  the patient to transplant at this time due to elevated PA pressures and recent smoking.     -2g Na dietary restriction, 1500 mL fluid restriction, strict I/Os                CKD (chronic kidney disease), stage III    - cr 2.2 today  -Baseline creat 1.6-2.0        PVT (paroxysmal ventricular tachycardia)    - held beta blocker for hypotension, and on         Biventricular implantable cardioverter-defibrillator in situ    - in place  - ICD interrogated, event noted on 2/25/18 with appropriate shock for VT        Cardiogenic shock    - now S/P IABP on 3/6/18  - VAD tomorrow            YANET Coleman  Heart Transplant  [facility]

## 2018-03-09 NOTE — PROGRESS NOTES
Dr. Kaufman notified of uop, cvp, gtts, lab results, vad numbers and CO/CI. Orders given to administer 500 cc of 5% albumin over 5 hours. Orders also given to administer 20 mEq of Potassium and to replace phos. Dr. Kaufman also gave orders to administer 10mg of Lasix if u/o dropped below 100 cc/hr. Will continue to monitor

## 2018-03-09 NOTE — PROGRESS NOTES
Arrhythmia clinic  Called to bedside. Dr. Kaufman states pt had sudden onset atrial arrhythmia, synchronized cardioverted pt. Rates slowed to 120s.  Underlying rhythm assessed, appears to be ST or AT 1:1 conduction at 122bpm.  Dr. Kaufman wanted to consult EP to discuss over drive pacing termination.  Spoke with Dr. Bell and reviewed DCCV strip and underlying rhythm.  Dr. Bell agrees rhythm is prob AT/ST and does not recommend any pacing. Dr. Kaufman aware.

## 2018-03-09 NOTE — PROGRESS NOTES
Arrhythmia clinic  Pt back from OR.  ICD tachy detection and therapies reprogrammed to ON at bedside.

## 2018-03-09 NOTE — SUBJECTIVE & OBJECTIVE
"Interval HPI:   Overnight events: remains in ICU, intubated, IABP in place, to OR this AM for chest closure but underwent exp lap, possible chest closure tomorrow. BG well cotnrolled overnight on IV insulin infusion protocol.   Eating:   NPO  Hypoglycemia and intervention: No  Fever: No  TPN and/or TF: No    BP (!) 84/0 (BP Location: Left arm, Patient Position: Lying)   Pulse (!) 112   Temp 100 °F (37.8 °C) (Core ([place]))   Resp 20   Ht 6' 1" (1.854 m)   Wt 115.9 kg (255 lb 8.2 oz)   SpO2 100%   BMI 33.71 kg/m²     Labs Reviewed and Include      Recent Labs  Lab 03/09/18  0308   *   CALCIUM 9.5   ALBUMIN 3.6   PROT 6.2      K 3.4*   CO2 25   CL 99   BUN 23*   CREATININE 2.2*   ALKPHOS 55   ALT 27   AST 89*   BILITOT 1.4*     Lab Results   Component Value Date    WBC 12.60 03/09/2018    HGB 10.0 (L) 03/09/2018    HCT 30.4 (L) 03/09/2018    MCV 84 03/09/2018     (L) 03/09/2018     No results for input(s): TSH, FREET4 in the last 168 hours.  Lab Results   Component Value Date    HGBA1C 5.5 03/08/2018     Nutritional status:   Body mass index is 33.71 kg/m².  Lab Results   Component Value Date    ALBUMIN 3.6 03/09/2018    ALBUMIN 3.6 03/08/2018    ALBUMIN 3.4 (L) 03/07/2018     Lab Results   Component Value Date    PREALBUMIN 14 (L) 03/08/2018    PREALBUMIN 29 01/23/2018       Estimated Creatinine Clearance: 53 mL/min (A) (based on SCr of 2.2 mg/dL (H)).    Accu-Checks  Recent Labs      03/08/18   2102  03/08/18   2205  03/08/18   2304  03/09/18   0003  03/09/18   0103  03/09/18   0203  03/09/18   0312  03/09/18   0400  03/09/18   0502  03/09/18   0613   POCTGLUCOSE  192*  188*  172*  178*  181*  166*  138*  143*  155*  143*       Current Medications and/or Treatments Impacting Glycemic Control  Pressors:    Autonomic Drugs     Start     Stop Route Frequency Ordered    03/08/18 1445  EPINEPHrine (ADRENALIN) 4 mg in sodium chloride 0.9% 250 mL infusion     Question Answer Comment   Titrate " by: (in mcg/kg/min) 0.01    Titrate interval: (in minutes) 5    Titrate to maintain: (SBP or MAP or Cardiac Index) MAP    Greater than: (in mmHg) 60    Maximum dose: (in mcg/kg/min) 2        -- IV Continuous 03/08/18 1347    03/08/18 1445  norepinephrine 4 mg in dextrose 5% 250 mL infusion (premix) (titrating)     Question Answer Comment   Titrate by: (in mcg/kg/min) 0.01    Titrate interval: (in minutes) 5    Titrate to maintain: (MAP or SBP) MAP    Greater than: (in mmHg) 60    Maximum dose: (in mcg/kg/min) 3        -- IV Continuous 03/08/18 1347        Hyperglycemia/Diabetes Medications: Antihyperglycemics     Start     Stop Route Frequency Ordered    03/08/18 1445  insulin regular (Humulin R) 100 Units in sodium chloride 0.9% 100 mL infusion      -- IV Continuous 03/08/18 1340

## 2018-03-09 NOTE — ASSESSMENT & PLAN NOTE
Estimated Creatinine Clearance: 53 mL/min (A) (based on SCr of 2.2 mg/dL (H)).  avoid hypoglycemia  Caution with insulin stacking

## 2018-03-09 NOTE — PROGRESS NOTES
Medtronic CRT-D  Request to turn off Tachytherapies prior to chest closure today.  Underlying rhythm is sinus rhythm at 105 bpm, not pacemaker dependent.     Tachy-therapy deactivated. Please call device clinic after procedure to turn tachy therapy on or on call cardiology if after hours.    Yohan Rizvi MD  Cardiology Fellow PGY-4  Pager: [phone]

## 2018-03-09 NOTE — PROGRESS NOTES
Pt arrived from surgery with dr. Segal, anesthesia and perfusion team at bedside.   Pt connected to monitor, vent.  Labs and abg done.  Propofol gtt turned off for assessment.  Pt not responding to any stimuli, not following commands.

## 2018-03-09 NOTE — PROGRESS NOTES
Pt returned from OR to room 6081.  Pt returned without incident.  Pt placed back on vent at documented settings.  Will continue to monitor.

## 2018-03-09 NOTE — PROGRESS NOTES
[facility]  Cardiothoracic Surgery  Progress Note    Patient Name: Tim Richards  MRN: [id number]  Admission Date: 3/1/2018  Hospital Length of Stay: 8 days  Code Status: Full Code   Attending Physician: Yg Kaufman MD   Referring Provider: Amy Rhodes MD  Principal Problem:Acute decompensated heart failure    Subjective:     Post-Op Info:  Procedure(s):  REVISION   Day of Surgery     Subjective:     Post-Op Info:  Procedure(s):  REVISION   Day of Surgery       Interval History:   Taken to OR today for revision of outflow graft. Still with open chest. Balloon pump pulled. Multiple episodes of vtach, amiodarone started and ICD interrogated.     Medications:  Continuous Infusions:   sodium chloride 0.9%      amiodarone in dextrose 5% 1 mg/min (03/09/18 1410)    amiodarone in dextrose 5%      dextrose 5 % and 0.45 % NaCl with KCl 40 mEq      DOBUTamine 4 mcg/kg/min (03/09/18 1300)    epinephrine infusion 0.07 mcg/kg/min (03/09/18 1300)    furosemide (LASIX) 1 mg/mL infusion (non-titrating) 5 mg/hr (03/09/18 1330)    insulin (HUMAN R) infusion (adults) 1.1 Units/hr (03/09/18 1200)    nicardipine 2.5 mg/hr (03/09/18 1300)    nitric oxide gas      norepinephrine bitartrate-D5W Stopped (03/09/18 1230)    propofol 20 mcg/kg/min (03/09/18 1300)     Scheduled Meds:   amiodarone in dextrose  150 mg Intravenous Once    ceFEPime (MAXIPIME) IVPB  1 g Intravenous Q12H    docusate sodium  200 mg Oral QHS    ferrous gluconate  324 mg Oral Daily with breakfast    fluconazole (DIFLUCAN) IVPB  400 mg Intravenous Q24H    magnesium sulfate IVPB  1 g Intravenous Once    meperidine  50 mg Intravenous Once    mupirocin   Nasal BID    pantoprazole  40 mg Oral Daily    pantoprazole  40 mg Intravenous Daily    potassium chloride  40 mEq Intravenous Once    sodium chloride 0.9%  3 mL Intravenous Q8H    sugammadex         PRN Meds:sodium chloride, albumin human 5%, albuterol sulfate,  bisacodyl, dextrose 50%, dextrose 50%, fentaNYL, magnesium hydroxide 400 mg/5 ml, oxyCODONE, oxyCODONE     Objective:     Vital Signs (Most Recent):  Temp: 99.4 °F (37.4 °C) (03/09/18 1100)  Pulse: (!) 119 (03/09/18 1430)  Resp: (!) 8 (03/09/18 1400)  BP: 108/65 (03/09/18 1015)  SpO2: 100 % (03/09/18 0613) Vital Signs (24h Range):  Temp:  [98.2 °F (36.8 °C)-100.5 °F (38.1 °C)] 99.4 °F (37.4 °C)  Pulse:  [] 119  Resp:  [6-21] 8  SpO2:  [96 %-100 %] 100 %  BP: ()/(0-73) 108/65  Arterial Line BP: ()/(40-86) 81/74       Intake/Output Summary (Last 24 hours) at 03/09/18 1439  Last data filed at 03/09/18 1300   Gross per 24 hour   Intake             4072 ml   Output             3881 ml   Net              191 ml       Physical Exam   Constitutional: He appears well-developed and well-nourished.   Cardiovascular:   Tachycardic, mechanical hum   Pulmonary/Chest: Effort normal.   ETT in place  Nitric   Chest tubes with serosang output   Abdominal: Soft. He exhibits no distension.   Neurological:   Follows commands when off sedation       Significant Labs:  CBC:   Recent Labs  Lab 03/09/18  1339   WBC 14.22*   RBC 3.30*   HGB 9.3*   HCT 28.0*   *   MCV 85   MCH 28.2   MCHC 33.2     CMP:   Recent Labs  Lab 03/09/18  0308  03/09/18  1339   *  < > 126*   CALCIUM 9.5  < > 9.0   ALBUMIN 3.6  --   --    PROT 6.2  --   --      < > 136   K 3.4*  < > 3.9   CO2 25  < > 25   CL 99  < > 100   BUN 23*  < > 25*   CREATININE 2.2*  < > 2.2*   ALKPHOS 55  --   --    ALT 27  --   --    AST 89*  --   --    BILITOT 1.4*  --   --    < > = values in this interval not displayed.    Significant Diagnostics:  CXR: I have reviewed all pertinent results/findings within the past 24 hours    Procedure: Device Interrogation Including analysis of device parameters  Current Settings: Ventricular Assist Device  Review of device function is stable  TXP LVAD INTERROGATIONS 3/9/2018 3/9/2018 3/9/2018 3/9/2018 3/9/2018  3/9/2018 3/9/2018   Type HeartMate II HeartMate II HeartMate II (No Data) HeartMate II HeartMate II HeartMate II   Flow 5.0 5.5 5.5 - 5.0 4.8 4.9   Speed 9400 9400 9400 - 9400 9400 9400   PI 5.8 5.3 5.3 - 6.5 6.8 6.7   Power ([place]) 5.7 6.0 6.0 - 5.7 5.6 5.6   LSL 9000 9000 9000 - 9000 9000 9000   Pulsatility Pulse - - - Pulse Pulse Pulse     Assessment/Plan:     * Acute decompensated heart failure    S/p LVAD         LVAD (left ventricular assist device) present    Neuro:   - Pain mgmt: fentanyl PRN  - Sedation: propofol     Pulmonary:   - Intubated  - On nitric  - Maintain chest tubes     Cardiac:   - Drips: dobutamine 5, epi 0.08, levo 0.04  - No aspirin- Prevent II trial  - Plan for chest closure tomorrow     Renal:    - /hr  - BUN/Cr stable  - Given 10 lasix, 5/hr lasix gtt     Hepatic:  LFTs MWF     ID:   - cefepime, fluconazole while chest open     Hem/Onc:   - Monitor hgb; stable     Endocrine:    - Insulin gtt  - Endocrine following      Fluids/Electrolytes/Nutrition/GI:   - Replace electrolytes PRN per protocol     DISPO:  - Continue SICU care              Vidhi Nicolas MD  Cardiothoracic Surgery  [facility]

## 2018-03-09 NOTE — CONSULTS
"  [facility]  Adult Nutrition  Consult Note    SUMMARY     Recommendations  Recommendation/Intervention:   1. When able to extubate, ADAT to Cardiac with texture per SLP.   2. If unable to extubate, RD to provide TF recommendations.   3. CSU RD to follow-up with cardiac and coumadin diet education when appropriate.   RD to monitor.    Goals: Patient to receive nutrition by RD follow-up  Nutrition Goal Status: new  Communication of RD Recs:  (POC)    Reason for Assessment  Reason for Assessment: consult  Diagnosis: cardiac disease (s/p LVAD)  Relevant Medical History: CHF, CKD3, HTN  General Information Comments: Patient intubated, sedated. S/p LVAD 3/8. OR this morning for repositioning of outflow graft. Chest open, possible closure tomorrow.  Nutrition Discharge Planning: Unable to determine at this time.    Nutrition Risk Screen  Nutrition Risk Screen: no indicators present    Nutrition/Diet History  Do you have any cultural, spiritual, Roman Catholic conflicts, given your current situation?: none  Factors Affecting Nutritional Intake: NPO, on mechanical ventilation    Anthropometrics  Temp: 99.4 °F (37.4 °C)  Height Method: Stated  Height: 6' 1" (185.4 cm)  Height (inches): 73 in  Weight Method: Bed Scale  Weight: 115.9 kg (255 lb 8.2 oz)  Weight (lb): 255.52 lb  Ideal Body Weight (IBW), Male: 184 lb  % Ideal Body Weight, Male (lb): 148.57 lb  BMI (Calculated): 36.1  BMI Grade: 35 - 39.9 - obesity - grade II    Lab/Procedures/Meds  Pertinent Labs Reviewed: reviewed  Pertinent Labs Comments: BUN 23, Creat 2.2, Glu 149, T. bili 1.4, AST 89,   Pertinent Medications Reviewed: reviewed  Pertinent Medications Comments: docusate, pantoprazole, dobutamine, epinephrine, insulin, cardene, levophed, propofol    Physical Findings/Assessment  Overall Physical Appearance: on ventilator support  Tubes: orogastric tube, other (see comments) (chest tubes x2)  Oral/Mouth Cavity: WDL  Skin: edema, " "incision(s)    Estimated/Assessed Needs  Weight Used For Calorie Calculations: 115.9 kg (255 lb 8.2 oz)  Height: 6' 1" (185.4 cm)  Energy Calorie Requirements (kcal): 2445 kcal/day  Energy Need Method: [place]  Vtot (L/Min) for [place] Equation Calculation: 10.2  RMR ([place]-St[place] Jeor Equation): 2067.88  RMR ([place] Equation): 2444.64  RMR (Modified [place] Equation): 2270.71  Protein Requirements: 126-168 g/day (1.5-2.0 g/kg IBW)  Weight Used For Protein Calculations: 83.6 kg (184 lb 4.9 oz) (IBW)  Fluid Requirements (mL): per MD  RDA Method (mL): 2445     Nutrition Prescription Ordered  Current Diet Order: NPO    Evaluation of Received Nutrient/Fluid Intake  Other Calories (kcal): 393 (propofol)  I/O: -4.9L since admit, good UOP  Comments: LBM 3/6  % Intake of Estimated Energy Needs: 0 - 25 %  % Meal Intake: NPO    Nutrition Risk  Level of Risk/Frequency of Follow-up: high (2x/week)     Assessment and Plan  LVAD (left ventricular assist device) present    Contributing Nutrition Diagnosis  Increased nutrient needs    Related to (etiology):   Physiological causes    Signs and Symptoms (as evidenced by):   S/p LVAD    Nutrition Diagnosis Status:   New          Monitor and Evaluation  Food and Nutrient Intake: energy intake  Food and Nutrient Adminstration: diet order  Knowledge/Beliefs/Attitudes: food and nutrition knowledge/skill  Physical Activity and Function: nutrition-related ADLs and IADLs  Anthropometric Measurements: weight, weight change  Biochemical Data, Medical Tests and Procedures: electrolyte and renal panel, gastrointestinal profile, inflammatory profile  Nutrition-Focused Physical Findings: overall appearance     Nutrition Follow-Up  RD Follow-up?: Yes  "

## 2018-03-09 NOTE — ANESTHESIA POSTPROCEDURE EVALUATION
"Anesthesia Post Evaluation    Patient: Tim Richards    Procedure(s) Performed: Procedure(s):  REVISION    Final Anesthesia Type: general  Patient location during evaluation: ICU  Patient participation: No - Unable to Participate, Intubation  Level of consciousness: responds to stimulation  Post-procedure vital signs: reviewed and stable  Pain management: adequate  Airway patency: patent  PONV status at discharge: No PONV  Anesthetic complications: no      Cardiovascular status: hemodynamically stable  Respiratory status: ventilator and ETT  Hydration status: euvolemic  Follow-up not needed.        Visit Vitals  BP (!) 84/0 (BP Location: Left arm, Patient Position: Lying)   Pulse 109   Temp 37.7 °C (99.8 °F) (Core ([place]))   Resp 18   Ht 6' 1" (1.854 m)   Wt 115.9 kg (255 lb 8.2 oz)   SpO2 100%   BMI 33.71 kg/m²       Pain/Iker Score: Pain Assessment Performed: Yes (3/9/2018  3:00 AM)  Presence of Pain: denies (3/9/2018  3:00 AM)  Pain Rating Prior to Med Admin: 0 (3/9/2018  4:28 AM)      "

## 2018-03-09 NOTE — PROGRESS NOTES
Notified Dr. Kaufman of franchesca profile, uop, cvp, chest tube output, increased vad speed to 9400, admin 500cc of 5% albumin, abg and lactic results.

## 2018-03-09 NOTE — PROGRESS NOTES
10 mg of lasix given at 2315 due to u/o only being 75 cc at 2300 per order of Dr. Kaufman.     Pt. At 0045 became hypertensive with systolics in 110's and  MAPs ranging from 86-89. Dr. Iyer notified. Orders given to wean down levo. Levo weaned down and eventually turned off. Pts. BP remained elevated so Epi was weaned to 0.07. Pt. CI, CO, and LVAD flows remain unchanged. MAP's remain in low to mid 80's with systolic's in 100's. Cardene started. Will continue to monitor

## 2018-03-09 NOTE — ASSESSMENT & PLAN NOTE
Neuro:   - Intubated  - Sedated - Propofol     Pulmonary:   - Intubated  Vent Mode: SIMV  Oxygen Concentration (%):  [] 50  Resp Rate Total:  [15 br/min-24 br/min] 20 br/min  Vt Set:  [500 mL-550 mL] 550 mL  PEEP/CPAP:  [5 cmH20] 5 cmH20  Pressure Support:  [10 cmH20] 10 cmH20  Mean Airway Pressure:  [9.6 giS10-31 cmH20] 12 cmH20  - Scheduled duo-nebs  - Nitric. Wean per CTS     Cardiac:  - s/p LVAD 3/8/18  - sternal wound closure on 3/9  - Drips: Epi, Levo, Cardene  - Wean pressors per CTS     Renal:   - CKD; baseline Cr. 1.7  - Lagunas in place  - Monitor UOP     Infectious Disease:   - Afebrile, WBC normal  - Routine emilie-op abx  - Trend WBC     Hematology/Oncology:  - Post-op H/H stable  - Trend CBC     Endocrine:  - Insuline gtt  - Endocrine on board     Fluids/Electrolytes/Nutrition/GI:   - NPO, NGT  - Trend CMP  - Replace Lytes PRN     Pain Management::   - PRN Fentanyl     Dispo:  Continue SICU care. CTS primary.

## 2018-03-09 NOTE — SUBJECTIVE & OBJECTIVE
Follow-up For: Procedure(s) (LRB):  CLOSURE-STERNAL WOUND (N/A)  Insertion-Right Ventricular Assist Device (N/A)    Post-Operative Day: Day of Surgery    Objective:     Vital Signs (Most Recent):  Temp: 100 °F (37.8 °C) (03/09/18 0600)  Pulse: (!) 112 (03/09/18 0613)  Resp: 20 (03/09/18 0613)  BP: (!) 84/0 (03/08/18 2300)  SpO2: 100 % (03/09/18 0613) Vital Signs (24h Range):  Temp:  [98.2 °F (36.8 °C)-100.5 °F (38.1 °C)] 100 °F (37.8 °C)  Pulse:  [] 112  Resp:  [6-21] 20  SpO2:  [95 %-100 %] 100 %  BP: ()/(0-73) 84/0  Arterial Line BP: ()/(33-70) 105/62     Weight: 115.9 kg (255 lb 8.2 oz)  Body mass index is 33.71 kg/m².      Intake/Output Summary (Last 24 hours) at 03/09/18 0738  Last data filed at 03/09/18 0600   Gross per 24 hour   Intake             6258 ml   Output             4391 ml   Net             1867 ml       Physical Exam   Constitutional: He appears well-developed and well-nourished.   HENT:   Head: Normocephalic and atraumatic.   ETT, OGT in place   Eyes: EOM are normal. Pupils are equal, round, and reactive to light.   Neck: Neck supple.   Cardiovascular:   Mechanical hum   Pulmonary/Chest: Breath sounds normal. No respiratory distress. He has no wheezes.   Intubated. Mediastinal chest tubes x2 in place. SS output   Abdominal: Soft. He exhibits no distension. There is no tenderness.   Genitourinary:   Genitourinary Comments: Lagunas in place   Neurological:   Intubated, sedated       Vents:  Vent Mode: SIMV (03/09/18 0613)  Ventilator Initiated: Yes (03/08/18 1400)  Set Rate: 20 bmp (03/09/18 0613)  Vt Set: 550 mL (03/09/18 0613)  Pressure Support: 10 cmH20 (03/09/18 0613)  PEEP/CPAP: 5 cmH20 (03/09/18 0613)  Oxygen Concentration (%): 50 (03/09/18 0613)  Peak Airway Pressure: 23 cmH2O (03/09/18 0613)  Plateau Pressure: 0 cmH20 (03/09/18 0613)  Total Ve: 11.2 mL (03/09/18 0613)  F/VT Ratio<105 (RSBI): (!) 35.52 (03/09/18 0613)    Lines/Drains/Airways     Central Venous Catheter  Line                 Percutaneous Central Line Insertion/Assessment - triple lumen  03/08/18 0726 right internal jugular 1 day         Pulmonary Artery Catheter Assessment  03/08/18 0726 1 day          Drain                 NG/OG Tube 03/08/18 0731 [place] sump 14 Fr. Right mouth 1 day         Urethral Catheter 03/08/18 0736 Temperature probe;Straight-tip;Non-latex 16 Fr. 1 day         Chest Tube 03/08/18 1125 Mediastinal 32 Fr. less than 1 day         Chest Tube 03/08/18 1125 Mediastinal 32 Fr. less than 1 day          Airway                 Airway - Non-Surgical 03/08/18 0824 Endotracheal Tube less than 1 day          Arterial Line                 Arterial Line 03/08/18 0721 Left Radial 1 day          Line                 VAD 03/08/18 1124 Left ventricular assist device HeartMate II less than 1 day          Peripheral Intravenous Line                 Midline Catheter Insertion/Assessment  - Single Lumen 03/07/18 1130 Right cephalic vein (lateral side of arm) 18g x 8cm 1 day         Peripheral IV - Single Lumen 03/07/18 1130 Right Forearm 1 day         Peripheral IV - Single Lumen 03/08/18 0739 Left Forearm less than 1 day                Significant Labs:    CBC/Anemia Profile:    Recent Labs  Lab 03/08/18 1959 03/08/18 2359 03/09/18  0308   WBC 16.47* 13.44* 12.60   HGB 10.3* 10.3* 10.0*   HCT 31.1* 30.9* 30.4*   * 122* 120*   MCV 84 85 84   RDW 15.2* 15.2* 15.3*        Chemistries:    Recent Labs  Lab 03/07/18  1524 03/08/18 0212  03/08/18 1959 03/08/18  2359 03/09/18  0308    133*  < > 134* 134* 136   K 3.6 3.3*  < > 3.3* 3.4* 3.4*   CL 99 92*  < > 98 99 99   CO2 26 29  < > 23 23 25   BUN 16 18  < > 23* 25* 23*   CREATININE 1.6* 1.8*  < > 2.4* 2.3* 2.2*   CALCIUM 9.5 10.0  < > 9.3 9.2 9.5   ALBUMIN 3.4* 3.6  --   --   --  3.6   PROT 7.0 7.7  --   --   --  6.2   BILITOT 1.2* 1.2*  --   --   --  1.4*   ALKPHOS 85 87  --   --   --  55   ALT 19 27  --   --   --  27   AST 28 33  --   --   --  89*   MG   --  2.1  < > 2.0 2.2 2.1   PHOS  --   --   < > 1.9* 3.2 3.6   < > = values in this interval not displayed.    All pertinent labs within the past 24 hours have been reviewed.    Significant Imaging:  I have reviewed and interpreted all pertinent imaging results/findings within the past 24 hours.

## 2018-03-09 NOTE — PROGRESS NOTES
UPDATE    Attempted to see wife for update due to pt intubated and sedated following LVAD placement Thursday 3/8/18. Wife not in room at time of SW visit. Will follow-up next week. Providing ongoing psychosocial and counseling support, education, resources, assistance and discharge planning as indicated. Following and available.

## 2018-03-09 NOTE — PLAN OF CARE
Problem: Patient Care Overview  Goal: Plan of Care Review  Outcome: Ongoing (interventions implemented as appropriate)  S/p heartmate II, open chest  waking up following commands   iapb 1:2 ekg  Lasix 10mg iv x1, albumin 1L iv   OR tomorrow for chest closure    Goal: Individualization & Mutuality  Outcome: Ongoing (interventions implemented as appropriate)  Pt juves dwight joyner

## 2018-03-09 NOTE — PROGRESS NOTES
[facility]  Critical Care - Surgery  Progress Note    Patient Name: Tim Richards  MRN: [id number]  Admission Date: 3/1/2018  Hospital Length of Stay: 8 days  Code Status: Full Code  Attending Provider: Yg Kaufman MD  Primary Care Provider: Ja Méndez MD   Principal Problem: Acute decompensated heart failure    Subjective:     Hospital/ICU Course:  3/8 - LVAD placement  3/9 - sternal wound closure        Follow-up For: Procedure(s) (LRB):  CLOSURE-STERNAL WOUND (N/A)  Insertion-Right Ventricular Assist Device (N/A)    Post-Operative Day: Day of Surgery    Objective:     Vital Signs (Most Recent):  Temp: 100 °F (37.8 °C) (03/09/18 0600)  Pulse: (!) 112 (03/09/18 0613)  Resp: 20 (03/09/18 0613)  BP: (!) 84/0 (03/08/18 2300)  SpO2: 100 % (03/09/18 0613) Vital Signs (24h Range):  Temp:  [98.2 °F (36.8 °C)-100.5 °F (38.1 °C)] 100 °F (37.8 °C)  Pulse:  [] 112  Resp:  [6-21] 20  SpO2:  [95 %-100 %] 100 %  BP: ()/(0-73) 84/0  Arterial Line BP: ()/(33-70) 105/62     Weight: 115.9 kg (255 lb 8.2 oz)  Body mass index is 33.71 kg/m².      Intake/Output Summary (Last 24 hours) at 03/09/18 0738  Last data filed at 03/09/18 0600   Gross per 24 hour   Intake             6258 ml   Output             4391 ml   Net             1867 ml       Physical Exam   Constitutional: He appears well-developed and well-nourished.   HENT:   Head: Normocephalic and atraumatic.   ETT, OGT in place   Eyes: EOM are normal. Pupils are equal, round, and reactive to light.   Neck: Neck supple.   Cardiovascular:   Mechanical hum   Pulmonary/Chest: Breath sounds normal. No respiratory distress. He has no wheezes.   Intubated. Mediastinal chest tubes x2 in place. SS output   Abdominal: Soft. He exhibits no distension. There is no tenderness.   Genitourinary:   Genitourinary Comments: Lagunas in place   Neurological:   Intubated, sedated       Vents:  Vent Mode: SIMV (03/09/18 0613)  Ventilator Initiated:  Yes (03/08/18 1400)  Set Rate: 20 bmp (03/09/18 0613)  Vt Set: 550 mL (03/09/18 0613)  Pressure Support: 10 cmH20 (03/09/18 0613)  PEEP/CPAP: 5 cmH20 (03/09/18 0613)  Oxygen Concentration (%): 50 (03/09/18 0613)  Peak Airway Pressure: 23 cmH2O (03/09/18 0613)  Plateau Pressure: 0 cmH20 (03/09/18 0613)  Total Ve: 11.2 mL (03/09/18 0613)  F/VT Ratio<105 (RSBI): (!) 35.52 (03/09/18 0613)    Lines/Drains/Airways     Central Venous Catheter Line                 Percutaneous Central Line Insertion/Assessment - triple lumen  03/08/18 0726 right internal jugular 1 day         Pulmonary Artery Catheter Assessment  03/08/18 0726 1 day          Drain                 NG/OG Tube 03/08/18 0731 [place] sump 14 Fr. Right mouth 1 day         Urethral Catheter 03/08/18 0736 Temperature probe;Straight-tip;Non-latex 16 Fr. 1 day         Chest Tube 03/08/18 1125 Mediastinal 32 Fr. less than 1 day         Chest Tube 03/08/18 1125 Mediastinal 32 Fr. less than 1 day          Airway                 Airway - Non-Surgical 03/08/18 0824 Endotracheal Tube less than 1 day          Arterial Line                 Arterial Line 03/08/18 0721 Left Radial 1 day          Line                 VAD 03/08/18 1124 Left ventricular assist device HeartMate II less than 1 day          Peripheral Intravenous Line                 Midline Catheter Insertion/Assessment  - Single Lumen 03/07/18 1130 Right cephalic vein (lateral side of arm) 18g x 8cm 1 day         Peripheral IV - Single Lumen 03/07/18 1130 Right Forearm 1 day         Peripheral IV - Single Lumen 03/08/18 0739 Left Forearm less than 1 day                Significant Labs:    CBC/Anemia Profile:    Recent Labs  Lab 03/08/18  1959 03/08/18  2359 03/09/18  0308   WBC 16.47* 13.44* 12.60   HGB 10.3* 10.3* 10.0*   HCT 31.1* 30.9* 30.4*   * 122* 120*   MCV 84 85 84   RDW 15.2* 15.2* 15.3*        Chemistries:    Recent Labs  Lab 03/07/18  1524 03/08/18  0212  03/08/18  1959 03/08/18  4865  03/09/18  0308    133*  < > 134* 134* 136   K 3.6 3.3*  < > 3.3* 3.4* 3.4*   CL 99 92*  < > 98 99 99   CO2 26 29  < > 23 23 25   BUN 16 18  < > 23* 25* 23*   CREATININE 1.6* 1.8*  < > 2.4* 2.3* 2.2*   CALCIUM 9.5 10.0  < > 9.3 9.2 9.5   ALBUMIN 3.4* 3.6  --   --   --  3.6   PROT 7.0 7.7  --   --   --  6.2   BILITOT 1.2* 1.2*  --   --   --  1.4*   ALKPHOS 85 87  --   --   --  55   ALT 19 27  --   --   --  27   AST 28 33  --   --   --  89*   MG  --  2.1  < > 2.0 2.2 2.1   PHOS  --   --   < > 1.9* 3.2 3.6   < > = values in this interval not displayed.    All pertinent labs within the past 24 hours have been reviewed.    Significant Imaging:  I have reviewed and interpreted all pertinent imaging results/findings within the past 24 hours.    Assessment/Plan:     Chronic systolic heart failure    Neuro:   - Intubated  - Sedated - Propofol     Pulmonary:   - Intubated  Vent Mode: SIMV  Oxygen Concentration (%):  [] 50  Resp Rate Total:  [15 br/min-24 br/min] 20 br/min  Vt Set:  [500 mL-550 mL] 550 mL  PEEP/CPAP:  [5 cmH20] 5 cmH20  Pressure Support:  [10 cmH20] 10 cmH20  Mean Airway Pressure:  [9.6 jiD20-24 cmH20] 12 cmH20  - Scheduled duo-nebs  - Nitric. Wean per CTS     Cardiac:  - s/p LVAD 3/8/18  - sternal wound closure on 3/9  - Drips: Epi, Levo, Cardene  - Wean pressors per CTS     Renal:   - CKD; baseline Cr. 1.7  - Lagunas in place  - Monitor UOP     Infectious Disease:   - Afebrile, WBC normal  - Routine emilie-op abx  - Trend WBC     Hematology/Oncology:  - Post-op H/H stable  - Trend CBC     Endocrine:  - Insuline gtt  - Endocrine on board     Fluids/Electrolytes/Nutrition/GI:   - NPO, NGT  - Trend CMP  - Replace Lytes PRN     Pain Management::   - PRN Fentanyl     Dispo:  Continue SICU care. CTS primary.                    Critical care was time spent personally by me on the following activities: development of treatment plan with patient or surrogate and bedside caregivers, discussions with  consultants, evaluation of patient's response to treatment, examination of patient, ordering and performing treatments and interventions, ordering and review of laboratory studies, ordering and review of radiographic studies, pulse oximetry, re-evaluation of patient's condition.  This critical care time did not overlap with that of any other provider or involve time for any procedures.     Ross Cunningham MD  Critical Care - Surgery  [facility]

## 2018-03-09 NOTE — ASSESSMENT & PLAN NOTE
Neuro:   - Pain mgmt: fentanyl PRN  - Sedation: propofol     Pulmonary:   - Intubated  - On nitric  - Maintain chest tubes     Cardiac:   - Drips: dobutamine 5, epi 0.08, levo 0.04  - No aspirin- Prevent II trial  - Plan for chest closure tomorrow     Renal:    - /hr  - BUN/Cr stable  - Given 10 lasix, 5/hr lasix gtt     Hepatic:  LFTs MWF     ID:   - cefepime, fluconazole while chest open     Hem/Onc:   - Monitor hgb; stable     Endocrine:    - Insulin gtt  - Endocrine following      Fluids/Electrolytes/Nutrition/GI:   - Replace electrolytes PRN per protocol     DISPO:  - Continue SICU care

## 2018-03-10 ENCOUNTER — ANESTHESIA (OUTPATIENT)
Dept: SURGERY | Facility: HOSPITAL | Age: 53
DRG: 001 | End: 2018-03-10
Payer: COMMERCIAL

## 2018-03-10 PROBLEM — R57.0 CARDIOGENIC SHOCK: Status: RESOLVED | Noted: 2018-03-07 | Resolved: 2018-03-10

## 2018-03-10 LAB
ABO + RH BLD: NORMAL
ALBUMIN SERPL BCP-MCNC: 3.1 G/DL
ALLENS TEST: ABNORMAL
ALP SERPL-CCNC: 60 U/L
ALT SERPL W/O P-5'-P-CCNC: 21 U/L
ANION GAP SERPL CALC-SCNC: 11 MMOL/L
ANION GAP SERPL CALC-SCNC: 12 MMOL/L
ANION GAP SERPL CALC-SCNC: 13 MMOL/L
ANION GAP SERPL CALC-SCNC: 13 MMOL/L
ANISOCYTOSIS BLD QL SMEAR: ABNORMAL
ANISOCYTOSIS BLD QL SMEAR: SLIGHT
APTT BLDCRRT: 23.5 SEC
APTT BLDCRRT: 23.7 SEC
APTT BLDCRRT: 24.9 SEC
APTT BLDCRRT: 26.2 SEC
APTT BLDCRRT: 26.3 SEC
APTT BLDCRRT: 26.7 SEC
AST SERPL-CCNC: 56 U/L
BACTERIA CATH TIP CULT: NO GROWTH
BASO STIPL BLD QL SMEAR: ABNORMAL
BASOPHILS # BLD AUTO: 0.01 K/UL
BASOPHILS # BLD AUTO: 0.02 K/UL
BASOPHILS # BLD AUTO: 0.03 K/UL
BASOPHILS # BLD AUTO: ABNORMAL K/UL
BASOPHILS NFR BLD: 0 %
BASOPHILS NFR BLD: 0.1 %
BASOPHILS NFR BLD: 0.2 %
BILIRUB SERPL-MCNC: 2 MG/DL
BLD GP AB SCN CELLS X3 SERPL QL: NORMAL
BUN SERPL-MCNC: 25 MG/DL
BUN SERPL-MCNC: 25 MG/DL
BUN SERPL-MCNC: 27 MG/DL
BUN SERPL-MCNC: 27 MG/DL
BUN SERPL-MCNC: 29 MG/DL
BUN SERPL-MCNC: 31 MG/DL
CALCIUM SERPL-MCNC: 9.2 MG/DL
CALCIUM SERPL-MCNC: 9.2 MG/DL
CALCIUM SERPL-MCNC: 9.3 MG/DL
CALCIUM SERPL-MCNC: 9.4 MG/DL
CALCIUM SERPL-MCNC: 9.4 MG/DL
CALCIUM SERPL-MCNC: 9.5 MG/DL
CHLORIDE SERPL-SCNC: 97 MMOL/L
CHLORIDE SERPL-SCNC: 98 MMOL/L
CHLORIDE SERPL-SCNC: 99 MMOL/L
CO2 SERPL-SCNC: 25 MMOL/L
CO2 SERPL-SCNC: 26 MMOL/L
CO2 SERPL-SCNC: 27 MMOL/L
CO2 SERPL-SCNC: 27 MMOL/L
CREAT SERPL-MCNC: 2.1 MG/DL
CREAT SERPL-MCNC: 2.1 MG/DL
CREAT SERPL-MCNC: 2.2 MG/DL
CREAT SERPL-MCNC: 2.3 MG/DL
DACRYOCYTES BLD QL SMEAR: ABNORMAL
DELSYS: ABNORMAL
DIFFERENTIAL METHOD: ABNORMAL
EOSINOPHIL # BLD AUTO: 0 K/UL
EOSINOPHIL # BLD AUTO: ABNORMAL K/UL
EOSINOPHIL NFR BLD: 0 %
EOSINOPHIL NFR BLD: 0.1 %
EOSINOPHIL NFR BLD: 1 %
ERYTHROCYTE [DISTWIDTH] IN BLOOD BY AUTOMATED COUNT: 15.1 %
ERYTHROCYTE [DISTWIDTH] IN BLOOD BY AUTOMATED COUNT: 15.3 %
ERYTHROCYTE [DISTWIDTH] IN BLOOD BY AUTOMATED COUNT: 15.4 %
ERYTHROCYTE [DISTWIDTH] IN BLOOD BY AUTOMATED COUNT: 15.6 %
ERYTHROCYTE [SEDIMENTATION RATE] IN BLOOD BY WESTERGREN METHOD: 18 MM/H
EST. GFR  (AFRICAN AMERICAN): 36.6 ML/MIN/1.73 M^2
EST. GFR  (AFRICAN AMERICAN): 38.7 ML/MIN/1.73 M^2
EST. GFR  (AFRICAN AMERICAN): 40.9 ML/MIN/1.73 M^2
EST. GFR  (AFRICAN AMERICAN): 40.9 ML/MIN/1.73 M^2
EST. GFR  (NON AFRICAN AMERICAN): 31.7 ML/MIN/1.73 M^2
EST. GFR  (NON AFRICAN AMERICAN): 33.4 ML/MIN/1.73 M^2
EST. GFR  (NON AFRICAN AMERICAN): 35.4 ML/MIN/1.73 M^2
EST. GFR  (NON AFRICAN AMERICAN): 35.4 ML/MIN/1.73 M^2
FIO2: 50
FLOW: 60
FLOW: 60
GIANT PLATELETS BLD QL SMEAR: PRESENT
GLUCOSE SERPL-MCNC: 144 MG/DL (ref 70–110)
GLUCOSE SERPL-MCNC: 147 MG/DL
GLUCOSE SERPL-MCNC: 149 MG/DL
GLUCOSE SERPL-MCNC: 149 MG/DL
GLUCOSE SERPL-MCNC: 154 MG/DL
GLUCOSE SERPL-MCNC: 157 MG/DL
GLUCOSE SERPL-MCNC: 161 MG/DL
HCO3 UR-SCNC: 27.7 MMOL/L (ref 24–28)
HCO3 UR-SCNC: 27.9 MMOL/L (ref 24–28)
HCO3 UR-SCNC: 27.9 MMOL/L (ref 24–28)
HCO3 UR-SCNC: 28.4 MMOL/L (ref 24–28)
HCO3 UR-SCNC: 28.7 MMOL/L (ref 24–28)
HCO3 UR-SCNC: 29.1 MMOL/L (ref 24–28)
HCO3 UR-SCNC: 29.1 MMOL/L (ref 24–28)
HCO3 UR-SCNC: 29.4 MMOL/L (ref 24–28)
HCO3 UR-SCNC: 31.2 MMOL/L (ref 24–28)
HCT VFR BLD AUTO: 24.4 %
HCT VFR BLD AUTO: 24.7 %
HCT VFR BLD AUTO: 25.5 %
HCT VFR BLD AUTO: 25.8 %
HCT VFR BLD AUTO: 25.9 %
HCT VFR BLD AUTO: 26.1 %
HCT VFR BLD CALC: 26 %PCV (ref 36–54)
HGB BLD-MCNC: 8.1 G/DL
HGB BLD-MCNC: 8.2 G/DL
HGB BLD-MCNC: 8.6 G/DL
HGB BLD-MCNC: 8.6 G/DL
HGB BLD-MCNC: 8.7 G/DL
HGB BLD-MCNC: 8.8 G/DL
HYPOCHROMIA BLD QL SMEAR: ABNORMAL
HYPOCHROMIA BLD QL SMEAR: ABNORMAL
IMM GRANULOCYTES # BLD AUTO: 0.08 K/UL
IMM GRANULOCYTES # BLD AUTO: 0.09 K/UL
IMM GRANULOCYTES # BLD AUTO: 0.09 K/UL
IMM GRANULOCYTES # BLD AUTO: 0.1 K/UL
IMM GRANULOCYTES # BLD AUTO: 0.16 K/UL
IMM GRANULOCYTES # BLD AUTO: ABNORMAL K/UL
IMM GRANULOCYTES NFR BLD AUTO: 0.6 %
IMM GRANULOCYTES NFR BLD AUTO: 0.8 %
IMM GRANULOCYTES NFR BLD AUTO: 1.2 %
IMM GRANULOCYTES NFR BLD AUTO: ABNORMAL %
INR PPP: 1
INR PPP: 1.1
LDH SERPL L TO P-CCNC: 1.02 MMOL/L (ref 0.36–1.25)
LDH SERPL L TO P-CCNC: 1.04 MMOL/L (ref 0.36–1.25)
LDH SERPL L TO P-CCNC: 1.14 MMOL/L (ref 0.36–1.25)
LDH SERPL L TO P-CCNC: 1.14 MMOL/L (ref 0.36–1.25)
LDH SERPL L TO P-CCNC: 1.17 MMOL/L (ref 0.36–1.25)
LDH SERPL L TO P-CCNC: 1.2 MMOL/L (ref 0.36–1.25)
LDH SERPL L TO P-CCNC: 1.46 MMOL/L (ref 0.36–1.25)
LYMPHOCYTES # BLD AUTO: 0.5 K/UL
LYMPHOCYTES # BLD AUTO: 0.6 K/UL
LYMPHOCYTES # BLD AUTO: 0.6 K/UL
LYMPHOCYTES # BLD AUTO: 0.7 K/UL
LYMPHOCYTES # BLD AUTO: 0.7 K/UL
LYMPHOCYTES # BLD AUTO: ABNORMAL K/UL
LYMPHOCYTES NFR BLD: 3.5 %
LYMPHOCYTES NFR BLD: 4.3 %
LYMPHOCYTES NFR BLD: 4.5 %
LYMPHOCYTES NFR BLD: 4.7 %
LYMPHOCYTES NFR BLD: 5 %
LYMPHOCYTES NFR BLD: 5.3 %
MAGNESIUM SERPL-MCNC: 2.1 MG/DL
MAGNESIUM SERPL-MCNC: 2.1 MG/DL
MAGNESIUM SERPL-MCNC: 2.2 MG/DL
MAGNESIUM SERPL-MCNC: 2.2 MG/DL
MAGNESIUM SERPL-MCNC: 2.3 MG/DL
MAGNESIUM SERPL-MCNC: 2.6 MG/DL
MCH RBC QN AUTO: 27.3 PG
MCH RBC QN AUTO: 27.7 PG
MCH RBC QN AUTO: 28.1 PG
MCH RBC QN AUTO: 28.2 PG
MCHC RBC AUTO-ENTMCNC: 33.2 G/DL
MCHC RBC AUTO-ENTMCNC: 33.2 G/DL
MCHC RBC AUTO-ENTMCNC: 33.3 G/DL
MCHC RBC AUTO-ENTMCNC: 33.6 G/DL
MCHC RBC AUTO-ENTMCNC: 33.7 G/DL
MCHC RBC AUTO-ENTMCNC: 33.7 G/DL
MCV RBC AUTO: 82 FL
MCV RBC AUTO: 84 FL
MCV RBC AUTO: 85 FL
METHEMOGLOBIN: 0.7 % (ref 0–3)
MODE: ABNORMAL
MONOCYTES # BLD AUTO: 1.6 K/UL
MONOCYTES # BLD AUTO: 1.8 K/UL
MONOCYTES # BLD AUTO: 1.9 K/UL
MONOCYTES # BLD AUTO: 2 K/UL
MONOCYTES # BLD AUTO: 2.2 K/UL
MONOCYTES # BLD AUTO: ABNORMAL K/UL
MONOCYTES NFR BLD: 10 %
MONOCYTES NFR BLD: 12.1 %
MONOCYTES NFR BLD: 12.6 %
MONOCYTES NFR BLD: 13.5 %
MONOCYTES NFR BLD: 14.1 %
MONOCYTES NFR BLD: 15.9 %
NEUTROPHILS # BLD AUTO: 10.7 K/UL
NEUTROPHILS # BLD AUTO: 10.9 K/UL
NEUTROPHILS # BLD AUTO: 11 K/UL
NEUTROPHILS # BLD AUTO: 11.6 K/UL
NEUTROPHILS # BLD AUTO: 11.7 K/UL
NEUTROPHILS NFR BLD: 77.9 %
NEUTROPHILS NFR BLD: 80 %
NEUTROPHILS NFR BLD: 81.1 %
NEUTROPHILS NFR BLD: 82 %
NEUTROPHILS NFR BLD: 82.6 %
NEUTROPHILS NFR BLD: 83.1 %
NEUTS BAND NFR BLD MANUAL: 2 %
NRBC BLD-RTO: 0 /100 WBC
OVALOCYTES BLD QL SMEAR: ABNORMAL
OVALOCYTES BLD QL SMEAR: ABNORMAL
PCO2 BLDA: 36.9 MMHG (ref 35–45)
PCO2 BLDA: 37 MMHG (ref 35–45)
PCO2 BLDA: 38 MMHG (ref 35–45)
PCO2 BLDA: 39.2 MMHG (ref 35–45)
PCO2 BLDA: 39.5 MMHG (ref 35–45)
PCO2 BLDA: 39.9 MMHG (ref 35–45)
PCO2 BLDA: 39.9 MMHG (ref 35–45)
PCO2 BLDA: 40.4 MMHG (ref 35–45)
PCO2 BLDA: 44.4 MMHG (ref 35–45)
PEEP: 5
PH SMN: 7.42 [PH] (ref 7.35–7.45)
PH SMN: 7.45 [PH] (ref 7.35–7.45)
PH SMN: 7.46 [PH] (ref 7.35–7.45)
PH SMN: 7.47 [PH] (ref 7.35–7.45)
PH SMN: 7.48 [PH] (ref 7.35–7.45)
PH SMN: 7.49 [PH] (ref 7.35–7.45)
PH SMN: 7.53 [PH] (ref 7.35–7.45)
PHOSPHATE SERPL-MCNC: 4.5 MG/DL
PHOSPHATE SERPL-MCNC: 4.8 MG/DL
PHOSPHATE SERPL-MCNC: 5 MG/DL
PHOSPHATE SERPL-MCNC: 5.2 MG/DL
PHOSPHATE SERPL-MCNC: 5.5 MG/DL
PHOSPHATE SERPL-MCNC: 6.1 MG/DL
PIP: 24
PIP: 31
PLATELET # BLD AUTO: 117 K/UL
PLATELET # BLD AUTO: 117 K/UL
PLATELET # BLD AUTO: 119 K/UL
PLATELET # BLD AUTO: 120 K/UL
PLATELET # BLD AUTO: 121 K/UL
PLATELET # BLD AUTO: 163 K/UL
PLATELET BLD QL SMEAR: ABNORMAL
PLATELET BLD QL SMEAR: ABNORMAL
PMV BLD AUTO: 11.3 FL
PMV BLD AUTO: 11.4 FL
PMV BLD AUTO: 11.4 FL
PMV BLD AUTO: 11.6 FL
PMV BLD AUTO: 11.7 FL
PMV BLD AUTO: 11.8 FL
PO2 BLDA: 138 MMHG (ref 80–100)
PO2 BLDA: 138 MMHG (ref 80–100)
PO2 BLDA: 141 MMHG (ref 80–100)
PO2 BLDA: 148 MMHG (ref 80–100)
PO2 BLDA: 156 MMHG (ref 80–100)
PO2 BLDA: 211 MMHG (ref 80–100)
PO2 BLDA: 37 MMHG (ref 40–60)
PO2 BLDA: 62 MMHG (ref 80–100)
PO2 BLDA: 94 MMHG (ref 80–100)
POC BE: 4 MMOL/L
POC BE: 5 MMOL/L
POC BE: 6 MMOL/L
POC BE: 9 MMOL/L
POC IONIZED CALCIUM: 1.11 MMOL/L (ref 1.06–1.42)
POC SATURATED O2: 100 % (ref 95–100)
POC SATURATED O2: 100 % (ref 95–100)
POC SATURATED O2: 73 % (ref 95–100)
POC SATURATED O2: 92 % (ref 95–100)
POC SATURATED O2: 98 % (ref 95–100)
POC SATURATED O2: 99 % (ref 95–100)
POC TCO2: 29 MMOL/L (ref 23–27)
POC TCO2: 30 MMOL/L (ref 23–27)
POC TCO2: 30 MMOL/L (ref 24–29)
POC TCO2: 31 MMOL/L (ref 23–27)
POC TCO2: 32 MMOL/L (ref 23–27)
POCT GLUCOSE: 124 MG/DL (ref 70–110)
POCT GLUCOSE: 126 MG/DL (ref 70–110)
POCT GLUCOSE: 130 MG/DL (ref 70–110)
POCT GLUCOSE: 130 MG/DL (ref 70–110)
POCT GLUCOSE: 139 MG/DL (ref 70–110)
POCT GLUCOSE: 140 MG/DL (ref 70–110)
POCT GLUCOSE: 140 MG/DL (ref 70–110)
POCT GLUCOSE: 141 MG/DL (ref 70–110)
POCT GLUCOSE: 142 MG/DL (ref 70–110)
POCT GLUCOSE: 144 MG/DL (ref 70–110)
POCT GLUCOSE: 147 MG/DL (ref 70–110)
POCT GLUCOSE: 149 MG/DL (ref 70–110)
POCT GLUCOSE: 154 MG/DL (ref 70–110)
POCT GLUCOSE: 163 MG/DL (ref 70–110)
POCT GLUCOSE: 166 MG/DL (ref 70–110)
POCT GLUCOSE: 169 MG/DL (ref 70–110)
POCT GLUCOSE: 170 MG/DL (ref 70–110)
POCT GLUCOSE: 179 MG/DL (ref 70–110)
POIKILOCYTOSIS BLD QL SMEAR: SLIGHT
POIKILOCYTOSIS BLD QL SMEAR: SLIGHT
POLYCHROMASIA BLD QL SMEAR: ABNORMAL
POLYCHROMASIA BLD QL SMEAR: ABNORMAL
POTASSIUM BLD-SCNC: 4.1 MMOL/L (ref 3.5–5.1)
POTASSIUM SERPL-SCNC: 3.6 MMOL/L
POTASSIUM SERPL-SCNC: 3.7 MMOL/L
POTASSIUM SERPL-SCNC: 3.8 MMOL/L
POTASSIUM SERPL-SCNC: 4 MMOL/L
POTASSIUM SERPL-SCNC: 4.1 MMOL/L
POTASSIUM SERPL-SCNC: 4.2 MMOL/L
PROT SERPL-MCNC: 6.1 G/DL
PROTHROMBIN TIME: 10.3 SEC
PROTHROMBIN TIME: 10.4 SEC
PROTHROMBIN TIME: 10.5 SEC
PROTHROMBIN TIME: 10.6 SEC
PROTHROMBIN TIME: 10.8 SEC
PROTHROMBIN TIME: 11 SEC
PS: 10
RBC # BLD AUTO: 2.92 M/UL
RBC # BLD AUTO: 3 M/UL
RBC # BLD AUTO: 3.05 M/UL
RBC # BLD AUTO: 3.05 M/UL
RBC # BLD AUTO: 3.1 M/UL
RBC # BLD AUTO: 3.12 M/UL
SAMPLE: ABNORMAL
SITE: ABNORMAL
SODIUM BLD-SCNC: 136 MMOL/L (ref 136–145)
SODIUM SERPL-SCNC: 134 MMOL/L
SODIUM SERPL-SCNC: 136 MMOL/L
SODIUM SERPL-SCNC: 137 MMOL/L
SODIUM SERPL-SCNC: 137 MMOL/L
SP02: 100
SP02: 100
TARGETS BLD QL SMEAR: ABNORMAL
TOXIC GRANULES BLD QL SMEAR: PRESENT
VT: 550
WBC # BLD AUTO: 13.27 K/UL
WBC # BLD AUTO: 13.58 K/UL
WBC # BLD AUTO: 13.77 K/UL
WBC # BLD AUTO: 13.94 K/UL
WBC # BLD AUTO: 14.41 K/UL
WBC # BLD AUTO: 14.48 K/UL

## 2018-03-10 PROCEDURE — 85025 COMPLETE CBC W/AUTO DIFF WBC: CPT

## 2018-03-10 PROCEDURE — 83735 ASSAY OF MAGNESIUM: CPT | Mod: 91

## 2018-03-10 PROCEDURE — 25000003 PHARM REV CODE 250: Performed by: ANESTHESIOLOGY

## 2018-03-10 PROCEDURE — 99900035 HC TECH TIME PER 15 MIN (STAT)

## 2018-03-10 PROCEDURE — 3E1Y38Z IRRIGATION OF PERICARDIAL CAVITY USING IRRIGATING SUBSTANCE, PERCUTANEOUS APPROACH: ICD-10-PCS | Performed by: THORACIC SURGERY (CARDIOTHORACIC VASCULAR SURGERY)

## 2018-03-10 PROCEDURE — 86920 COMPATIBILITY TEST SPIN: CPT

## 2018-03-10 PROCEDURE — 83050 HGB METHEMOGLOBIN QUAN: CPT

## 2018-03-10 PROCEDURE — 36000713 HC OR TIME LEV V EA ADD 15 MIN: Performed by: THORACIC SURGERY (CARDIOTHORACIC VASCULAR SURGERY)

## 2018-03-10 PROCEDURE — 37000009 HC ANESTHESIA EA ADD 15 MINS: Performed by: THORACIC SURGERY (CARDIOTHORACIC VASCULAR SURGERY)

## 2018-03-10 PROCEDURE — 27000248 HC VAD-ADDITIONAL DAY

## 2018-03-10 PROCEDURE — A4216 STERILE WATER/SALINE, 10 ML: HCPCS | Performed by: THORACIC SURGERY (CARDIOTHORACIC VASCULAR SURGERY)

## 2018-03-10 PROCEDURE — 85610 PROTHROMBIN TIME: CPT | Mod: 91

## 2018-03-10 PROCEDURE — 63600175 PHARM REV CODE 636 W HCPCS: Performed by: STUDENT IN AN ORGANIZED HEALTH CARE EDUCATION/TRAINING PROGRAM

## 2018-03-10 PROCEDURE — 0PQ00ZZ REPAIR STERNUM, OPEN APPROACH: ICD-10-PCS | Performed by: THORACIC SURGERY (CARDIOTHORACIC VASCULAR SURGERY)

## 2018-03-10 PROCEDURE — 63600175 PHARM REV CODE 636 W HCPCS: Performed by: THORACIC SURGERY (CARDIOTHORACIC VASCULAR SURGERY)

## 2018-03-10 PROCEDURE — 63600175 PHARM REV CODE 636 W HCPCS

## 2018-03-10 PROCEDURE — 94003 VENT MGMT INPAT SUBQ DAY: CPT

## 2018-03-10 PROCEDURE — 20000000 HC ICU ROOM

## 2018-03-10 PROCEDURE — 99233 SBSQ HOSP IP/OBS HIGH 50: CPT | Mod: ,,, | Performed by: ANESTHESIOLOGY

## 2018-03-10 PROCEDURE — 86901 BLOOD TYPING SEROLOGIC RH(D): CPT

## 2018-03-10 PROCEDURE — 25000003 PHARM REV CODE 250: Performed by: THORACIC SURGERY (CARDIOTHORACIC VASCULAR SURGERY)

## 2018-03-10 PROCEDURE — 99232 SBSQ HOSP IP/OBS MODERATE 35: CPT | Mod: ,,, | Performed by: INTERNAL MEDICINE

## 2018-03-10 PROCEDURE — 25000003 PHARM REV CODE 250: Performed by: STUDENT IN AN ORGANIZED HEALTH CARE EDUCATION/TRAINING PROGRAM

## 2018-03-10 PROCEDURE — 02UP0JZ SUPPLEMENT PULMONARY TRUNK WITH SYNTHETIC SUBSTITUTE, OPEN APPROACH: ICD-10-PCS | Performed by: THORACIC SURGERY (CARDIOTHORACIC VASCULAR SURGERY)

## 2018-03-10 PROCEDURE — 37799 UNLISTED PX VASCULAR SURGERY: CPT

## 2018-03-10 PROCEDURE — 27800903 OPTIME MED/SURG SUP & DEVICES OTHER IMPLANTS: Performed by: THORACIC SURGERY (CARDIOTHORACIC VASCULAR SURGERY)

## 2018-03-10 PROCEDURE — P9045 ALBUMIN (HUMAN), 5%, 250 ML: HCPCS | Mod: JG | Performed by: STUDENT IN AN ORGANIZED HEALTH CARE EDUCATION/TRAINING PROGRAM

## 2018-03-10 PROCEDURE — 63600175 PHARM REV CODE 636 W HCPCS: Performed by: ANESTHESIOLOGY

## 2018-03-10 PROCEDURE — 21750 REPAIR OF STERNUM SEPARATION: CPT | Mod: ,,, | Performed by: THORACIC SURGERY (CARDIOTHORACIC VASCULAR SURGERY)

## 2018-03-10 PROCEDURE — C1729 CATH, DRAINAGE: HCPCS | Performed by: THORACIC SURGERY (CARDIOTHORACIC VASCULAR SURGERY)

## 2018-03-10 PROCEDURE — 93312 ECHO TRANSESOPHAGEAL: CPT | Mod: 26,59,, | Performed by: ANESTHESIOLOGY

## 2018-03-10 PROCEDURE — 80053 COMPREHEN METABOLIC PANEL: CPT

## 2018-03-10 PROCEDURE — 37000008 HC ANESTHESIA 1ST 15 MINUTES: Performed by: THORACIC SURGERY (CARDIOTHORACIC VASCULAR SURGERY)

## 2018-03-10 PROCEDURE — 85730 THROMBOPLASTIN TIME PARTIAL: CPT

## 2018-03-10 PROCEDURE — 36600 WITHDRAWAL OF ARTERIAL BLOOD: CPT

## 2018-03-10 PROCEDURE — 27100025 HC TUBING, SET FLUID WARMER: Performed by: ANESTHESIOLOGY

## 2018-03-10 PROCEDURE — 27000221 HC OXYGEN, UP TO 24 HOURS

## 2018-03-10 PROCEDURE — C9113 INJ PANTOPRAZOLE SODIUM, VIA: HCPCS | Performed by: THORACIC SURGERY (CARDIOTHORACIC VASCULAR SURGERY)

## 2018-03-10 PROCEDURE — 80048 BASIC METABOLIC PNL TOTAL CA: CPT

## 2018-03-10 PROCEDURE — 83605 ASSAY OF LACTIC ACID: CPT

## 2018-03-10 PROCEDURE — 99233 SBSQ HOSP IP/OBS HIGH 50: CPT | Mod: ,,, | Performed by: INTERNAL MEDICINE

## 2018-03-10 PROCEDURE — 63600367 HC NITRIC OXIDE PER HOUR

## 2018-03-10 PROCEDURE — 84100 ASSAY OF PHOSPHORUS: CPT | Mod: 91

## 2018-03-10 PROCEDURE — 36000712 HC OR TIME LEV V 1ST 15 MIN: Performed by: THORACIC SURGERY (CARDIOTHORACIC VASCULAR SURGERY)

## 2018-03-10 PROCEDURE — D9220A PRA ANESTHESIA: Mod: ,,, | Performed by: ANESTHESIOLOGY

## 2018-03-10 PROCEDURE — 82803 BLOOD GASES ANY COMBINATION: CPT

## 2018-03-10 PROCEDURE — 99900026 HC AIRWAY MAINTENANCE (STAT)

## 2018-03-10 DEVICE — MEMBRANE PERICARDIAL 15X20CM: Type: IMPLANTABLE DEVICE | Site: CHEST | Status: FUNCTIONAL

## 2018-03-10 RX ORDER — MIDAZOLAM HYDROCHLORIDE 5 MG/ML
INJECTION INTRAMUSCULAR; INTRAVENOUS
Status: DISCONTINUED | OUTPATIENT
Start: 2018-03-10 | End: 2018-03-10

## 2018-03-10 RX ORDER — FENTANYL CITRATE 50 UG/ML
INJECTION, SOLUTION INTRAMUSCULAR; INTRAVENOUS
Status: DISCONTINUED | OUTPATIENT
Start: 2018-03-10 | End: 2018-03-10

## 2018-03-10 RX ORDER — ACETAMINOPHEN 650 MG/20.3ML
650 LIQUID ORAL EVERY 6 HOURS PRN
Status: DISCONTINUED | OUTPATIENT
Start: 2018-03-10 | End: 2018-03-22

## 2018-03-10 RX ORDER — CEFAZOLIN SODIUM 1 G/3ML
INJECTION, POWDER, FOR SOLUTION INTRAMUSCULAR; INTRAVENOUS
Status: DISCONTINUED | OUTPATIENT
Start: 2018-03-10 | End: 2018-03-10

## 2018-03-10 RX ORDER — ALBUMIN HUMAN 50 G/1000ML
12.5 SOLUTION INTRAVENOUS ONCE
Status: COMPLETED | OUTPATIENT
Start: 2018-03-10 | End: 2018-03-10

## 2018-03-10 RX ORDER — ROCURONIUM BROMIDE 10 MG/ML
INJECTION, SOLUTION INTRAVENOUS
Status: DISCONTINUED | OUTPATIENT
Start: 2018-03-10 | End: 2018-03-10

## 2018-03-10 RX ORDER — POTASSIUM CHLORIDE 29.8 MG/ML
40 INJECTION INTRAVENOUS ONCE
Status: COMPLETED | OUTPATIENT
Start: 2018-03-10 | End: 2018-03-10

## 2018-03-10 RX ORDER — ONDANSETRON 2 MG/ML
INJECTION INTRAMUSCULAR; INTRAVENOUS
Status: DISCONTINUED | OUTPATIENT
Start: 2018-03-10 | End: 2018-03-10

## 2018-03-10 RX ORDER — BACITRACIN 50000 [IU]/1
INJECTION, POWDER, FOR SOLUTION INTRAMUSCULAR
Status: DISCONTINUED | OUTPATIENT
Start: 2018-03-10 | End: 2018-03-10 | Stop reason: HOSPADM

## 2018-03-10 RX ORDER — LIDOCAINE HCL/PF 100 MG/5ML
SYRINGE (ML) INTRAVENOUS
Status: DISCONTINUED | OUTPATIENT
Start: 2018-03-10 | End: 2018-03-10

## 2018-03-10 RX ORDER — DOBUTAMINE HYDROCHLORIDE 200 MG/100ML
INJECTION INTRAVENOUS CONTINUOUS PRN
Status: DISCONTINUED | OUTPATIENT
Start: 2018-03-10 | End: 2018-03-10

## 2018-03-10 RX ORDER — POTASSIUM CHLORIDE 29.8 MG/ML
40 INJECTION INTRAVENOUS ONCE
Status: DISCONTINUED | OUTPATIENT
Start: 2018-03-10 | End: 2018-03-11

## 2018-03-10 RX ORDER — POTASSIUM CHLORIDE 29.8 MG/ML
40 INJECTION INTRAVENOUS ONCE
Status: DISCONTINUED | OUTPATIENT
Start: 2018-03-10 | End: 2018-03-10

## 2018-03-10 RX ORDER — HEPARIN SODIUM 10000 [USP'U]/100ML
1800 INJECTION, SOLUTION INTRAVENOUS CONTINUOUS
Status: DISCONTINUED | OUTPATIENT
Start: 2018-03-10 | End: 2018-03-21

## 2018-03-10 RX ADMIN — POTASSIUM CHLORIDE 40 MEQ: 400 INJECTION, SOLUTION INTRAVENOUS at 01:03

## 2018-03-10 RX ADMIN — PROPOFOL 30 MCG/KG/MIN: 10 INJECTION, EMULSION INTRAVENOUS at 04:03

## 2018-03-10 RX ADMIN — FENTANYL CITRATE 25 MCG: 50 INJECTION, SOLUTION INTRAMUSCULAR; INTRAVENOUS at 11:03

## 2018-03-10 RX ADMIN — Medication 3 ML: at 06:03

## 2018-03-10 RX ADMIN — DOBUTAMINE IN DEXTROSE 7.5 MCG/KG/MIN: 200 INJECTION, SOLUTION INTRAVENOUS at 07:03

## 2018-03-10 RX ADMIN — LIDOCAINE HYDROCHLORIDE 100 MG: 20 INJECTION, SOLUTION INTRAVENOUS at 09:03

## 2018-03-10 RX ADMIN — ROCURONIUM BROMIDE 50 MG: 10 INJECTION, SOLUTION INTRAVENOUS at 09:03

## 2018-03-10 RX ADMIN — PANTOPRAZOLE SODIUM 40 MG: 40 INJECTION, POWDER, FOR SOLUTION INTRAVENOUS at 08:03

## 2018-03-10 RX ADMIN — NICARDIPINE HYDROCHLORIDE 2.5 MG/HR: 0.2 INJECTION, SOLUTION INTRAVENOUS at 11:03

## 2018-03-10 RX ADMIN — HEPARIN SODIUM 400 UNITS/HR: 10000 INJECTION, SOLUTION INTRAVENOUS at 08:03

## 2018-03-10 RX ADMIN — ROCURONIUM BROMIDE 20 MG: 10 INJECTION, SOLUTION INTRAVENOUS at 10:03

## 2018-03-10 RX ADMIN — AMIODARONE HYDROCHLORIDE 0.5 MG/MIN: 1.8 INJECTION, SOLUTION INTRAVENOUS at 09:03

## 2018-03-10 RX ADMIN — ALBUMIN (HUMAN) 12.5 G: 12.5 SOLUTION INTRAVENOUS at 03:03

## 2018-03-10 RX ADMIN — CEFEPIME 1 G: 1 INJECTION, POWDER, FOR SOLUTION INTRAMUSCULAR; INTRAVENOUS at 11:03

## 2018-03-10 RX ADMIN — CALCIUM CHLORIDE 300 MG: 100 INJECTION, SOLUTION INTRAVENOUS at 09:03

## 2018-03-10 RX ADMIN — FLUCONAZOLE 400 MG: 2 INJECTION INTRAVENOUS at 11:03

## 2018-03-10 RX ADMIN — EPINEPHRINE 0.06 MCG/KG/MIN: 1 INJECTION PARENTERAL at 08:03

## 2018-03-10 RX ADMIN — POTASSIUM CHLORIDE 40 MEQ: 400 INJECTION, SOLUTION INTRAVENOUS at 05:03

## 2018-03-10 RX ADMIN — MUPIROCIN: 20 OINTMENT TOPICAL at 09:03

## 2018-03-10 RX ADMIN — EPINEPHRINE 0.06 MCG/KG/MIN: 1 INJECTION PARENTERAL at 04:03

## 2018-03-10 RX ADMIN — PROPOFOL 30 MCG/KG/MIN: 10 INJECTION, EMULSION INTRAVENOUS at 10:03

## 2018-03-10 RX ADMIN — AMIODARONE HYDROCHLORIDE 0.5 MG/MIN: 1.8 INJECTION, SOLUTION INTRAVENOUS at 11:03

## 2018-03-10 RX ADMIN — SODIUM CHLORIDE, SODIUM GLUCONATE, SODIUM ACETATE, POTASSIUM CHLORIDE, MAGNESIUM CHLORIDE, SODIUM PHOSPHATE, DIBASIC, AND POTASSIUM PHOSPHATE: .53; .5; .37; .037; .03; .012; .00082 INJECTION, SOLUTION INTRAVENOUS at 09:03

## 2018-03-10 RX ADMIN — PROPOFOL 30 MCG/KG/MIN: 10 INJECTION, EMULSION INTRAVENOUS at 07:03

## 2018-03-10 RX ADMIN — PROPOFOL 30 MCG/KG/MIN: 10 INJECTION, EMULSION INTRAVENOUS at 05:03

## 2018-03-10 RX ADMIN — AMIODARONE HYDROCHLORIDE 0.5 MG/MIN: 50 INJECTION, SOLUTION INTRAVENOUS at 09:03

## 2018-03-10 RX ADMIN — ACETAMINOPHEN 650 MG: 650 SOLUTION ORAL at 09:03

## 2018-03-10 RX ADMIN — FENTANYL CITRATE 100 MCG: 50 INJECTION, SOLUTION INTRAMUSCULAR; INTRAVENOUS at 09:03

## 2018-03-10 RX ADMIN — CEFAZOLIN 2 G: 330 INJECTION, POWDER, FOR SOLUTION INTRAMUSCULAR; INTRAVENOUS at 09:03

## 2018-03-10 RX ADMIN — DOBUTAMINE HYDROCHLORIDE 4 MCG/KG/MIN: 200 INJECTION INTRAVENOUS at 09:03

## 2018-03-10 RX ADMIN — AMIODARONE HYDROCHLORIDE 0.5 MG/MIN: 1.8 INJECTION, SOLUTION INTRAVENOUS at 03:03

## 2018-03-10 RX ADMIN — MUPIROCIN: 20 OINTMENT TOPICAL at 08:03

## 2018-03-10 RX ADMIN — MIDAZOLAM 2 MG: 5 INJECTION INTRAMUSCULAR; INTRAVENOUS at 09:03

## 2018-03-10 RX ADMIN — FENTANYL CITRATE 50 MCG: 50 INJECTION, SOLUTION INTRAMUSCULAR; INTRAVENOUS at 10:03

## 2018-03-10 NOTE — TRANSFER OF CARE
"Anesthesia Transfer of Care Note    Patient: Tim Richards    Procedure(s) Performed: Procedure(s) (LRB):  CLOSURE-STERNAL WOUND (N/A)  PLACEMENT-NEOPERICARDIUM    Patient location: ICU    Anesthesia Type: general    Transport from OR: Transported from OR intubated on 100% O2 by AMBU with assisted ventilation. Continuous ECG monitoring in transport. Continuous SpO2 monitoring in transport. Continuos invasive BP monitoring in transport    Post pain: adequate analgesia    Post assessment: no apparent anesthetic complications and tolerated procedure well    Post vital signs: stable    Level of consciousness: sedated    Nausea/Vomiting: no nausea/vomiting    Complications: none    Transfer of care protocol was followed      Last vitals:   Visit Vitals  BP 92/63 (BP Location: Left arm, Patient Position: Lying)   Pulse 101   Temp 37.8 °C (100.1 °F) (Core ([place]))   Resp 19   Ht 6' 1" (1.854 m)   Wt 119 kg (262 lb 5.6 oz)   SpO2 100%   BMI 34.61 kg/m²     "

## 2018-03-10 NOTE — ANESTHESIA PREPROCEDURE EVALUATION
[facility]  Anesthesia Pre-Operative Evaluation         Patient Name: Tim Richards  YOB: 1965  MRN: [id number]    SUBJECTIVE:     Pre-operative evaluation for Procedure(s) (LRB):  CLOSURE-STERNAL WOUND (N/A)  Insertion-Right Ventricular Assist Device (N/A)     03/09/2018    Tim Richards is a 52 y.o. male w/ a significant PMHx of difficult intubation (see prev airway documentation), CHF (EF 10%, PASP of 47), MI, HTN, CKD, s/p CRT and ICD for VT, and EtOH use. He was admitted from clinic on 3/1 with decompensated heart failure with plan for accelerated workup for LVAD/ heart transplant. He had IABP placed on 03/06/18 and an LVAD placed on 03/08/18.    Patient now presents for the above procedure(s).      LDA:       Pulmonary Artery Catheter Assessment  03/08/18 0726 (Active)   Number of days: 0            Peripheral IV - Single Lumen 03/07/18 1130 Right Forearm (Active)   Site Assessment Clean;Dry;Intact 3/8/2018  3:00 AM   Line Status Flushed 3/8/2018  3:00 AM   Dressing Status Clean;Dry;Intact 3/8/2018  3:00 AM   Dressing Intervention New dressing 3/7/2018 11:26 AM   Dressing Change Due 03/11/18 3/8/2018  3:00 AM   Site Change Due 03/11/18 3/7/2018  7:01 PM   Reason Not Rotated Not due 3/8/2018  3:00 AM   Number of days: 0            Peripheral IV - Single Lumen 03/08/18 0739 Left Forearm (Active)   Number of days: 0            Midline Catheter Insertion/Assessment  - Single Lumen 03/07/18 1130 Right cephalic vein (lateral side of arm) 18g x 8cm (Active)   Site Assessment Clean;Dry;Intact;No redness;No swelling 3/8/2018  3:00 AM   IV Device Securement catheter securement device 3/8/2018  3:00 AM   Line Status Flushed;Infusing 3/8/2018  3:00 AM   Dressing Status Biopatch in place 3/8/2018  3:00 AM   Dressing Intervention New dressing 3/7/2018 11:26 AM   Dressing Change Due 03/14/18 3/8/2018  3:00 AM   Site Change Due 04/05/18 3/7/2018  7:01 PM   Reason Not Rotated Not due  3/8/2018  3:00 AM   Number of days: 0            Arterial Line 03/08/18 0721 Left Radial (Active)   Number of days: 0            Urethral Catheter 03/08/18 0736 Temperature probe;Straight-tip;Non-latex 16 Fr. (Active)   Number of days: 0       Prev airway:   Present Prior to Hospital Arrival?: Yes; Placement Date: 03/08/18; Placement Time: 0824 (created via procedure documentation); Method of Intubation: Fiberoptic Intubation (Placed Airq LMA prior to fiberoptic intubation. Confirmed ventilation. Then placed 8.0 ETT w/ fiberoptic scope through AirQ without difficulty. ); Inserted by: Anesthesia Resident; Airway Device: Endotracheal Tube; Mask Ventilation: Other; Intubated: Postinduction; Airway Device Size: 8.0; Style: Cuffed; Cuff Inflation: Minimal occlusive pressure; Placement Verified By: Fiberoptic bronchoscopic inspection, Capnometry, Auscultation, ETT Condensation; Grade: Grade I; Complicating Factors: Anterior larynx; Intubation Findings: Positive EtCO2, Bilateral breath sounds, Atraumatic/Condition of teeth unchanged;  Depth of Insertion: 25.5; Securment: Lips; Complications: None; Breath Sounds: Equal Bilateral; Insertion Attempts: 1.    Placement Date: 10/31/14; Placement Time: 1205; Method of Intubation: Direct laryngoscopy, Glidescope; Inserted by: Anesthesia MD; Airway Device: Endotracheal Tube; Mask Ventilation: Mod Diff - oral; Intubated: Postinduction; Blade: Barrios #2; Airway Device Size: 7.5; Style: Cuffed; Cuff Inflation: Minimal occlusive pressure; Inflation Amount: 5.5; Placement Verified By: Auscultation, Capnometry, Fiberoptic bronchoscopic inspection; Grade: Grade IV; Complicating Factors: Large/Floppy epiglottis, Anterior larynx; Intubation Findings: Positive EtCO2, Bilateral breath sounds, Atraumatic/Condition of teeth unchanged;  Depth of Insertion: 23; Securment: Lips; Complications: Desaturation, Other (difficult intubation); Breath Sounds: Equal Bilateral; Insertion Attempts: 3 (enter  comment) (2nd 3rd attempts with glidescope poor views with 2nd attempt with glidescope); Removal Date: 10/31/14;  Removal Time: 1401    Drips:   Amiodarone  Epinephrine  Cardene   Lasix  Dobutamine    Patient Active Problem List   Diagnosis    Chronic systolic heart failure    Cigarette nicotine dependence without complication    Alcohol abuse    Pulmonary nodule seen on imaging study    Biventricular implantable cardioverter-defibrillator in situ    Acute on chronic combined systolic and diastolic heart failure    PVT (paroxysmal ventricular tachycardia)    High risk medications (amiodarone) long-term use    Dilated cardiomyopathy    Acute decompensated heart failure    CKD (chronic kidney disease), stage III    Hypertensive cardiovascular-renal disease, stage 1-4 or unspecified chronic kidney disease, with heart failure    Organ transplant candidate    Cardiogenic shock    LVAD (left ventricular assist device) present    Hyperglycemia       Review of patient's allergies indicates:   Allergen Reactions    Lisinopril Anaphylaxis       Current Inpatient Medications:      Current Facility-Administered Medications on File Prior to Visit   Medication Dose Route Frequency Provider Last Rate Last Dose    0.9%  NaCl infusion (for blood administration)   Intravenous Q24H PRN Ed Marroquin Jr., MD        0.9%  NaCl infusion   Intravenous Continuous Hossein Segal MD        albumin human 5% bottle 500 mL  500 mL Intravenous PRN Hossein Segal MD   500 mL at 03/08/18 2213    albuterol nebulizer solution 2.5 mg  2.5 mg Nebulization Q4H PRN Hossein Segal MD        amiodarone (CORDARONE) 360 mg/200 mL (1.8 mg/mL) infusion             amiodarone 360 mg/200 mL (1.8 mg/mL) infusion  0.5 mg/min Intravenous Continuous Vidhi Nicolas MD 16.7 mL/hr at 03/09/18 2100 0.5 mg/min at 03/09/18 2100    amiodarone in dextrose 150 mg/100 mL (1.5 mg/mL) loading dose 150 mg  150 mg Intravenous  Once Vidhi Nicolas MD        bisacodyl suppository 10 mg  10 mg Rectal Daily PRN Hossein Segal MD        ceFEPIme injection 1 g  1 g Intravenous Q12H Hossein Segal MD   1 g at 03/09/18 1230    dextrose 5 % and 0.45 % NaCl with KCl 40 mEq infusion   Intravenous Continuous Hossein Segal MD        dextrose 50% injection 12.5 g  12.5 g Intravenous PRN Hossein Segal MD        dextrose 50% injection 25 g  25 g Intravenous PRN Hosesin Segal MD        DOBUTamine 500mg in D5W 250mL infusion (premix) (NON-TITRATING)  5 mcg/kg/min Intravenous Continuous Vidhi Nicolas MD 14.9 mL/hr at 03/09/18 2100 4 mcg/kg/min at 03/09/18 2100    docusate sodium capsule 200 mg  200 mg Oral QHS Hossein Segal MD   Stopped at 03/09/18 2100    EPINEPHrine (ADRENALIN) 4 mg in sodium chloride 0.9% 250 mL infusion  0.08 mcg/kg/min Intravenous Continuous Hossein Segal MD 27.9 mL/hr at 03/09/18 2100 0.06 mcg/kg/min at 03/09/18 2100    fentaNYL injection 25 mcg  25 mcg Intravenous Q1H PRN Hossein Segal MD   25 mcg at 03/09/18 2020    ferrous gluconate tablet 324 mg  324 mg Oral Daily with breakfast Hossein Segal MD        fluconazole (DIFLUCAN) IVPB 400 mg 200 mL  400 mg Intravenous Q24H Hossein Segal  mL/hr at 03/09/18 0959 400 mg at 03/09/18 0959    furosemide (LASIX) 250 mg in sodium chloride 0.9 % 250 mL infusion (non-titrating)  5 mg/hr Intravenous Continuous Vidhi Nicolas MD 5 mL/hr at 03/09/18 2100 5 mg/hr at 03/09/18 2100    insulin regular (Humulin R) 100 Units in sodium chloride 0.9% 100 mL infusion  1 Units/hr Intravenous Continuous Hossein Segal MD 0.3 mL/hr at 03/09/18 2100 0.3 Units/hr at 03/09/18 2100    magnesium hydroxide 400 mg/5 ml suspension 2,400 mg  30 mL Oral Daily PRN Hossein Segal MD        meperidine (DEMEROL) 50 mg/mL injection             mupirocin 2 % ointment   Nasal BID Hossein Segal MD         niCARdipine 40 mg/200 mL infusion  1 mg/hr Intravenous Continuous Hossein Segal MD 12.5 mL/hr at 03/09/18 2100 2.5 mg/hr at 03/09/18 2100    nitric oxide gas Gas 10 ppm  10 ppm Inhalation Continuous Yg Kaufman MD   10 ppm at 03/09/18 1622    norepinephrine 4 mg in dextrose 5% 250 mL infusion (premix) (titrating)  0.02 mcg/kg/min Intravenous Continuous Yg Kaufman MD   Stopped at 03/09/18 1230    oxyCODONE immediate release tablet 10 mg  10 mg Oral Q4H PRN Hossein Segal MD        oxyCODONE immediate release tablet 5 mg  5 mg Oral Q4H PRN Hossein Segal MD        pantoprazole EC tablet 40 mg  40 mg Oral Daily Hossein Segal MD        pantoprazole injection 40 mg  40 mg Intravenous Daily Hossein Segal MD   40 mg at 03/09/18 1014    potassium chloride 20 mEq in 100 mL IVPB (FOR CENTRAL LINE ADMINISTRATION ONLY)  20 mEq Intravenous Once Fernando Bray MD        propofol (DIPRIVAN) 10 mg/mL infusion  10 mcg/kg/min Intravenous Continuous Vidhi Nicolas MD 18.6 mL/hr at 03/09/18 2100 25 mcg/kg/min at 03/09/18 2100    sodium chloride 0.9% flush 3 mL  3 mL Intravenous Q8H Hossein Segal MD   3 mL at 03/09/18 1400     Current Outpatient Prescriptions on File Prior to Visit   Medication Sig Dispense Refill    allopurinol (ZYLOPRIM) 100 MG tablet TAKE 1 TABLET (100 MG TOTAL) BY MOUTH 3 (THREE) TIMES DAILY. 90 tablet 1    amiodarone (PACERONE) 200 MG Tab TAKE 1 TABLET (200 MG TOTAL) BY MOUTH ONCE DAILY. 30 tablet 7    aspirin (ECOTRIN) 81 MG EC tablet Take 81 mg by mouth. 1 Tablet, Delayed Release (E.C.) Oral Every day      bumetanide (BUMEX) 2 MG tablet Take 1 tablet (2 mg total) by mouth 2 (two) times daily. 120 tablet 2    ERGOCALCIFEROL, VITAMIN D2, (VITAMIN D ORAL) Take by mouth once daily.      losartan (COZAAR) 25 MG tablet Take 25 mg by mouth once daily.  1    metOLazone (ZAROXOLYN) 2.5 MG tablet Take 2.5 mg by mouth every other day.  3     metoprolol succinate (TOPROL-XL) 25 MG 24 hr tablet Take 0.5 tablets (12.5 mg total) by mouth once daily. 30 tablet 2    potassium chloride SA (K-DUR,KLOR-CON) 10 MEQ tablet Take 2 tablets (20 mEq total) by mouth 2 (two) times daily. 240 tablet 2    spironolactone (ALDACTONE) 25 MG tablet Take 1 tablet (25 mg total) by mouth once daily. 30 tablet 0    valsartan (DIOVAN) 80 MG tablet Take 0.5 tablets (40 mg total) by mouth 2 (two) times daily. 30 tablet 1    VITAMIN E ACETATE (VITAMIN E ORAL) Take by mouth once daily.         Past Surgical History:   Procedure Laterality Date    CARDIAC CATHETERIZATION  Dec. 2012    CARDIAC DEFIBRILLATOR PLACEMENT Left     CRRT-D    COLONOSCOPY N/A 3/6/2018    Procedure: COLONOSCOPY;  Surgeon: Alonso Bone MD;  Location: [facility] ([address] [place] [address]);  Service: Endoscopy;  Laterality: N/A;       Social History     Social History    Marital status:      Spouse name: N/A    Number of children: N/A    Years of education: N/A     Occupational History     [employer]      Social History Main Topics    Smoking status: Former Smoker     Packs/day: 1.00     Years: 31.00     Types: Cigarettes     Quit date: 1/12/2018    Smokeless tobacco: Never Used      Comment: pt is quiting on his own - pt stated not qualified for program; 2/16 pt  quit on his own    Alcohol use No      Comment: one fifth of gin or rum/week    Drug use: No    Sexual activity: Yes     Partners: Female     Birth control/ protection: None      Comment: 10/5/17  with same partner 7 years      Other Topics Concern    Not on file     Social History Narrative    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       OBJECTIVE:     Vital Signs Range (Last 24H):  Temp:  [37.4 °C (99.4 °F)-38.1 °C (100.5 °F)]   Pulse:  []   Resp:  [6-21]   BP: ()/(0-65)   SpO2:  [100 %]   Arterial Line BP: ()/(40-86)       CBC:   Recent Labs      03/09/18   1724  03/09/18 2000   WBC  13.71*  13.09*   RBC  3.16*   3.05*   HGB  8.6*  8.7*   HCT  26.3*  25.6*   PLT  109*  151   MCV  83  84   MCH  27.2  28.5   MCHC  32.7  34.0       CMP:   Recent Labs      03/08/18   0212   03/09/18   0308   03/09/18   1724  03/09/18 2000   NA  133*   < >  136   < >  136  136   K  3.3*   < >  3.4*   < >  3.9  4.1   CL  92*   < >  99   < >  100  99   CO2  29   < >  25   < >  26  26   BUN  18   < >  23*   < >  24*  24*   CREATININE  1.8*   < >  2.2*   < >  2.1*  2.1*   GLU  143*   < >  149*   < >  160*  150*   MG  2.1   < >  2.1   < >  2.4  2.4   PHOS   --    < >  3.6   < >  4.9*  5.3*   CALCIUM  10.0   < >  9.5   < >  9.1  9.1   ALBUMIN  3.6   --   3.6   --    --    --    PROT  7.7   --   6.2   --    --    --    ALKPHOS  87   --   55   --    --    --    ALT  27   --   27   --    --    --    AST  33   --   89*   --    --    --    BILITOT  1.2*   --   1.4*   --    --    --     < > = values in this interval not displayed.       INR:  Recent Labs      03/09/18   0947  03/09/18   1339  03/09/18   1724   INR  1.2  1.1  1.1   APTT  36.3*  25.9  26.7       Diagnostic Studies: No relevant studies.    EKG: No recent studies available.    2D ECHO:  Results for orders placed or performed during the hospital encounter of 03/01/18   2D echo with color flow doppler   Result Value Ref Range    EF 8 (A) 55 - 65    Mitral Valve Regurgitation MODERATE TO SEVERE (A)     Est. PA Systolic Pressure 38.28     Tricuspid Valve Regurgitation MILD      CONCLUSIONS     1 - Eccentric LVH with severely depressed left ventricular systolic function (EF <10%).     2 - Severe left atrial enlargement.     3 - Mildly to moderately depressed right ventricular systolic function .     4 - Moderate to severe mitral regurgitation.     5 - Mild tricuspid regurgitation.     6 - The estimated PA systolic pressure is 38 mmHg.     7 - Increased LAP and increased LVEDP.     8 - There is pulsus noted in the LVOT Doppler Stroke volume .     ASSESSMENT/PLAN:     Pre-op Assessment    I have  reviewed the Patient Summary Reports.     I have reviewed the Nursing Notes.   I have reviewed the Medications.     Review of Systems  Anesthesia Hx:  Hx of Anesthetic complications  History of prior surgery of interest to airway management or planning: Previous anesthesia: General Airway issues documented on chart review include mask, difficult, difficult direct laryngoscopy, required fiberoptic intubation  Denies Family Hx of Anesthesia complications.  Personal Hx of Anesthesia complications  Difficult Intubation   Social:  Smoker    Hematology/Oncology:        Denies Current/Recent Cancer   EENT/Dental:   denies chronic allergic rhinitis   Cardiovascular:   Exercise tolerance: poor Pacemaker Hypertension Valvular problems/Murmurs (mild MR), MR Past MI Denies CAD.    Denies CABG/stent.  Denies Dysrhythmias.  CHF ECG has been reviewed.    Pulmonary:   Denies Pneumonia Denies Asthma.  Denies Shortness of breath.  Denies Recent URI. Pulmonary nodule   Renal/:   Chronic Renal Disease (creatinine=1.3 (baseline)), CRI    Hepatic/GI:  Hepatic/GI Normal Denies PUD.  Denies Hiatal Hernia.  Denies GERD.    Neurological:  Neurology Normal Denies TIA.  Denies CVA. Denies Seizures.    Endocrine:  Endocrine Normal Denies Diabetes.    Psych:   Denies Psychiatric History.        Physical Exam  General:  Well nourished    Airway/Jaw/Neck:  Airway Findings: Mouth Opening: Normal Tongue: Normal  Pre-Existing Airway Tube(s): Oral Endotracheal tube  General Airway Assessment: Adult  Jaw/Neck Findings:  Neck ROM: Normal ROM  Neck Findings: Normal M2-3, good airway opening, no loose dentition per pt, FROM, short thick neck    Dental:  Dental Findings: In tact, Periodontal disease, Mild   Chest/Lungs:  Chest/Lungs Findings: Clear to auscultation, Normal Respiratory Rate     Heart/Vascular:  Heart Findings: Rate: Normal  Rhythm: Regular Rhythm  Vascular Findings: Normal    Abdomen:  Abdomen Findings: Normal     Musculoskeletal:  Musculoskeletal Findings: Normal   Skin:  Skin Findings: Normal    Mental Status:  Mental Status Findings:  Unconscious         Anesthesia Plan  Type of Anesthesia, risks & benefits discussed:  Anesthesia Type:  general  Patient's Preference:   Intra-op Monitoring Plan: standard ASA monitors, arterial line and central line  Intra-op Monitoring Plan Comments:   Post Op Pain Control Plan: per primary service following discharge from PACU  Post Op Pain Control Plan Comments:   Induction:   IV  Beta Blocker:  Patient is not currently on a Beta-Blocker (No further documentation required).       Informed Consent: Patient understands risks and agrees with Anesthesia plan.  Questions answered. Anesthesia consent signed with patient.  ASA Score: 4     Day of Surgery Review of History & Physical:    H&P update referred to the surgeon.         Ready For Surgery From Anesthesia Perspective.

## 2018-03-10 NOTE — ANESTHESIA PROCEDURE NOTES
BRITTANY    Diagnosis: CHF  Patient location during procedure: OR  Procedure start time: 3/10/2018 9:50 AM  Timeout: 3/10/2018 9:30 AM  Procedure end time: 3/10/2018 11:00 AM  Surgery related to: chest closure  Exam type: Baseline  Staffing  Anesthesiologist: SHANI BAEZ  Resident/CRNA: ROOSEVELT PARR  Performed: resident/CRNA   Preanesthetic Checklist  Completed: patient identified, surgical consent, pre-op evaluation, timeout performed, risks and benefits discussed, monitors and equipment checked, anesthesia consent given, oxygen available, suction available, hand hygiene performed and patient being monitored  Setup & Induction  Patient preparation: bite block inserted  Probe Insertion: easy  Exam: complete  Exam         LVAD:yes  Estimated Ejection Fraction: < 25% severe            Right Heart  Right Ventricle Function: moderately decreased    Intra Atrial Septum          Right Ventricle    Aortic Valve:  Morphology: trileaflet  Regurgitation: no aortic valve regurgitation         Tricuspid Valve:  Morphology: normal  Regurgitation: mild              Effusions    Summary  Findings discussed with surgeon.    Other Findings

## 2018-03-10 NOTE — SIGNIFICANT EVENT
Patient for chest closure today  tachytherapies turned off at bedside (medtronic)  Underlying rhythm is sinus, not PPM dependant  Will turn on post-surgery

## 2018-03-10 NOTE — ASSESSMENT & PLAN NOTE
Neuro:   - Pain mgmt: fentanyl PRN  - Sedation: propofol     Pulmonary:   - Intubated  - On nitric  - Maintain chest tubes  - Wean to extubate tomorrow AM with anesthesia staff available     Cardiac:   - Drips: dobutamine 4, epi 0.06  - No aspirin- Prevent II trial     Renal:    - /hr  - BUN/Cr stable  - Lasix gtt at 5     Hepatic:  LFTs MWF     ID:   - cefepime, fluconazole for 48 hours post chest closure     Hem/Onc:   - Monitor hgb; stable  - May start heparin gtt this PM     Endocrine:    - Insulin gtt  - Endocrine following      Fluids/Electrolytes/Nutrition/GI:   - Replace electrolytes PRN per protocol     DISPO:  - Continue SICU care

## 2018-03-10 NOTE — PLAN OF CARE
Problem: Patient Care Overview  Goal: Plan of Care Review  Outcome: Ongoing (interventions implemented as appropriate)  S/p replacement of outflow graft  Chest remains open, following commands  Moving all extremities equally  aicd on  iabp removed today  Possible chest closure today

## 2018-03-10 NOTE — ASSESSMENT & PLAN NOTE
BG goal 140-180 while inpatient    Patient is still in critical condition.  Will continue IV intensive protocol requiring intensive BG monitoring q1hr.   Patient does not have history of DM.  Will likely step down quickly once clinically more stable.

## 2018-03-10 NOTE — PROGRESS NOTES
[facility]  Heart Transplant  Progress Note    Patient Name: Tim Richards  MRN: [id number]  Admission Date: 3/1/2018  Hospital Length of Stay: 9 days  Attending Physician: Yg Kaufman MD  Primary Care Provider: Ja Méndez MD  Principal Problem:Acute decompensated heart failure    Subjective:     Interval History: Intubated, sedated    Continuous Infusions:   sodium chloride 0.9%      amiodarone in dextrose 5% 0.5 mg/min (03/10/18 1300)    DOBUTamine 10 mcg/kg/min (03/10/18 1300)    epinephrine infusion 0.6 mcg/kg/min (03/10/18 1300)    furosemide (LASIX) 1 mg/mL infusion (non-titrating) 5 mg/hr (03/10/18 1300)    insulin (HUMAN R) infusion (adults) Stopped (03/09/18 2200)    nicardipine Stopped (03/10/18 1132)    nitric oxide gas      norepinephrine bitartrate-D5W Stopped (03/09/18 1230)    propofol 30 mcg/kg/min (03/10/18 1257)     Scheduled Meds:   amiodarone in dextrose  150 mg Intravenous Once    ceFEPime (MAXIPIME) IVPB  1 g Intravenous Q12H    docusate sodium  200 mg Oral QHS    ferrous gluconate  324 mg Oral Daily with breakfast    fluconazole (DIFLUCAN) IVPB  400 mg Intravenous Q24H    mupirocin   Nasal BID    pantoprazole  40 mg Oral Daily    pantoprazole  40 mg Intravenous Daily    potassium chloride  40 mEq Intravenous Once    sodium chloride 0.9%  3 mL Intravenous Q8H     PRN Meds:sodium chloride, albumin human 5%, albuterol sulfate, bisacodyl, dextrose 50%, dextrose 50%, fentaNYL, magnesium hydroxide 400 mg/5 ml, oxyCODONE, oxyCODONE    Review of patient's allergies indicates:   Allergen Reactions    Lisinopril Anaphylaxis     Objective:     Vital Signs (Most Recent):  Temp: 99.8 °F (37.7 °C) (03/10/18 1115)  Pulse: 106 (03/10/18 1345)  Resp: 19 (03/10/18 0315)  BP: (!) 84/0 (03/10/18 1115)  SpO2: 99 % (03/10/18 1345) Vital Signs (24h Range):  Temp:  [99.7 °F (37.6 °C)-100.2 °F (37.9 °C)] 99.8 °F (37.7 °C)  Pulse:  [100-122] 106  Resp:  [8-19]  19  SpO2:  [94 %-100 %] 99 %  BP: (80-95)/(0-71) 84/0  Arterial Line BP: (75-88)/(49-79) 76/68     Patient Vitals for the past 72 hrs (Last 3 readings):   Weight   03/10/18 0318 119 kg (262 lb 5.6 oz)   03/09/18 0333 115.9 kg (255 lb 8.2 oz)     Body mass index is 34.61 kg/m².      Intake/Output Summary (Last 24 hours) at 03/10/18 1354  Last data filed at 03/10/18 1300   Gross per 24 hour   Intake             3973 ml   Output             5742 ml   Net            -1769 ml     Hemodynamic Parameters:    Physical Exam   Constitutional: He appears well-developed and well-nourished. No distress.   Intubated and sedated   HENT:   Head: Normocephalic and atraumatic.   Neck: Normal range of motion. No JVD present.   Cardiovascular: Normal rate.  Exam reveals no friction rub.    No murmur heard.  Chest open   Pulmonary/Chest: Effort normal. No respiratory distress. He has no wheezes.   Abdominal: Soft. He exhibits no distension.   Musculoskeletal: Normal range of motion. He exhibits no edema.   Neurological: He is alert.   Skin: Skin is warm. Capillary refill takes 2 to 3 seconds. He is not diaphoretic. No erythema.   L CFA site clean and dry, no hematoma noted.   Psychiatric: He has a normal mood and affect. His behavior is normal. Judgment and thought content normal.     Significant Labs:  CBC:    Recent Labs  Lab 03/10/18  0400 03/10/18  0739 03/10/18  1059   WBC 13.77* 13.58* 13.27*   RBC 3.12* 3.05* 3.05*   HGB 8.8* 8.6* 8.6*   HCT 26.1* 25.5* 25.8*   * 163 117*   MCV 84 84 85   MCH 28.2 28.2 28.2   MCHC 33.7 33.7 33.3     BNP:    Recent Labs  Lab 03/05/18  0820 03/07/18  0258 03/09/18  0308   BNP 1,270* 1,110* 1,114*     CMP:    Recent Labs  Lab 03/08/18  0212  03/09/18  0308  03/10/18  0400 03/10/18  0739 03/10/18  1059   *  < > 149*  < > 149* 149* 161*   CALCIUM 10.0  < > 9.5  < > 9.3 9.4 9.5   ALBUMIN 3.6  --  3.6  --  3.1*  --   --    PROT 7.7  --  6.2  --  6.1  --   --    *  < > 136  < > 137  136 136   K 3.3*  < > 3.4*  < > 3.8 4.1 4.2   CO2 29  < > 25  < > 27 25 25   CL 92*  < > 99  < > 98 98 98   BUN 18  < > 23*  < > 25* 27* 27*   CREATININE 1.8*  < > 2.2*  < > 2.2* 2.2* 2.2*   ALKPHOS 87  --  55  --  60  --   --    ALT 27  --  27  --  21  --   --    AST 33  --  89*  --  56*  --   --    BILITOT 1.2*  --  1.4*  --  2.0*  --   --    < > = values in this interval not displayed.   Coagulation:     Recent Labs  Lab 03/10/18  0400 03/10/18  0739 03/10/18  1059   INR 1.0 1.0 1.0   APTT 26.7 23.5 24.9     LDH:    Recent Labs  Lab 03/08/18  0212        Microbiology:  Microbiology Results (last 7 days)     Procedure Component Value Units Date/Time    IV catheter culture [[id number]] Collected:  03/07/18 1151    Order Status:  Completed Specimen:  Catheter Tip from Catheter Tip, Intrajugular Updated:  03/10/18 0659     Aerobic Culture - Cath tip No growth    Blood culture [[id number]] Collected:  03/06/18 1107    Order Status:  Completed Specimen:  Blood Updated:  03/09/18 1612     Blood Culture, Routine No Growth to date     Blood Culture, Routine No Growth to date     Blood Culture, Routine No Growth to date     Blood Culture, Routine No Growth to date    Blood culture [[id number]] Collected:  03/06/18 1107    Order Status:  Completed Specimen:  Blood Updated:  03/09/18 1612     Blood Culture, Routine No Growth to date     Blood Culture, Routine No Growth to date     Blood Culture, Routine No Growth to date     Blood Culture, Routine No Growth to date    Blood culture [[id number]]     Order Status:  Canceled Specimen:  Blood     Blood culture [[id number]]     Order Status:  Canceled Specimen:  Blood           I have reviewed all pertinent labs within the past 24 hours.    Estimated Creatinine Clearance: 53.7 mL/min (A) (based on SCr of 2.2 mg/dL (H)).    Diagnostic Results:  I have reviewed and interpreted all pertinent imaging results/findings within the past 24 hours.    Assessment and Plan:     Mr. Richards  is a 52 y.o. year old Black or  male who has presents as f/u from hospitalization for ADHF after presenting to clinic 1/10/17 as initial consult. advanced surgical options (LVAD/OHT).    This 51 yo BM has had CHF since 2011 at which time an angiogram did not demonstrate any CAD.  He is on amiodarone for VT. He was admitted from 1/12-1/17/18 (see d/c summary) where he was treated for ADHF and initiation of w/u for advanced options. RHC during that admission showed RA 17 and PCWP 35 as well as severely reduced CI 1.6. Though not optimized, he was discharged per his request so as not to lose his job/insurance--see Dr. Hadley's attestation for full details on d/c summary. Since d/c, his Scr has risen from 2.0 on discharge to 2.8 (1/23/18). His BNP had declined to 1040.  He presents today for scheduled hospital f/u.      Today, he reports feeling well. He says he has more energy than usual. His only complaints are cramping and xerosis. He denies n/v/d, no CP, palpitations or syncope. He has stable orthostatic dizziness/LH. No PND or orthopnea. His COLEMAN is improved from discharge and his weight is down about 5-7 pounds on his home scale. Of note, his Scr on labs from 2 days ago showed Scr 2.8 up from 2.0. T. Bili was down from 1.1 to 0.6 and BNP was down to 1040 from 1700.  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 No labs initially ordered but I ordered and sent him down.     LVAD (left ventricular assist device) present    -HeartMate II Implanted 3/8/18 as DT with repositioning of outflow graft 3/9/18.  Anticipate chest closure today  -CTS Primary  -Implanted by Dr. Kaufman  -Anticoagulation per Primary Coumadin  -Speed set at 9200, LSL 8800 rpm  -Interrogation notable for no events  -Not listed for OHTx  - On Epi,  and NO  -Amio drip for A Fibi per CTS        Acute on chronic combined systolic and diastolic heart failure    -NICM  -now s/p HM II LVAD                CKD (chronic  kidney disease), stage III    - cr 2.2 today  -Baseline creat 1.6-2.0        PVT (paroxysmal ventricular tachycardia)    -has ICD        Biventricular implantable cardioverter-defibrillator in situ    - in place  - ICD interrogated, event noted on 2/25/18 with appropriate shock for VT            Nely Wilkinson PA-C  Heart Transplant  [facility]

## 2018-03-10 NOTE — SUBJECTIVE & OBJECTIVE
Interval History: Intubated, sedated    Continuous Infusions:   sodium chloride 0.9%      amiodarone in dextrose 5% 0.5 mg/min (03/10/18 1300)    DOBUTamine 10 mcg/kg/min (03/10/18 1300)    epinephrine infusion 0.6 mcg/kg/min (03/10/18 1300)    furosemide (LASIX) 1 mg/mL infusion (non-titrating) 5 mg/hr (03/10/18 1300)    insulin (HUMAN R) infusion (adults) Stopped (03/09/18 2200)    nicardipine Stopped (03/10/18 1132)    nitric oxide gas      norepinephrine bitartrate-D5W Stopped (03/09/18 1230)    propofol 30 mcg/kg/min (03/10/18 1257)     Scheduled Meds:   amiodarone in dextrose  150 mg Intravenous Once    ceFEPime (MAXIPIME) IVPB  1 g Intravenous Q12H    docusate sodium  200 mg Oral QHS    ferrous gluconate  324 mg Oral Daily with breakfast    fluconazole (DIFLUCAN) IVPB  400 mg Intravenous Q24H    mupirocin   Nasal BID    pantoprazole  40 mg Oral Daily    pantoprazole  40 mg Intravenous Daily    potassium chloride  40 mEq Intravenous Once    sodium chloride 0.9%  3 mL Intravenous Q8H     PRN Meds:sodium chloride, albumin human 5%, albuterol sulfate, bisacodyl, dextrose 50%, dextrose 50%, fentaNYL, magnesium hydroxide 400 mg/5 ml, oxyCODONE, oxyCODONE    Review of patient's allergies indicates:   Allergen Reactions    Lisinopril Anaphylaxis     Objective:     Vital Signs (Most Recent):  Temp: 99.8 °F (37.7 °C) (03/10/18 1115)  Pulse: 106 (03/10/18 1345)  Resp: 19 (03/10/18 0315)  BP: (!) 84/0 (03/10/18 1115)  SpO2: 99 % (03/10/18 1345) Vital Signs (24h Range):  Temp:  [99.7 °F (37.6 °C)-100.2 °F (37.9 °C)] 99.8 °F (37.7 °C)  Pulse:  [100-122] 106  Resp:  [8-19] 19  SpO2:  [94 %-100 %] 99 %  BP: (80-95)/(0-71) 84/0  Arterial Line BP: (75-88)/(49-79) 76/68     Patient Vitals for the past 72 hrs (Last 3 readings):   Weight   03/10/18 0318 119 kg (262 lb 5.6 oz)   03/09/18 0333 115.9 kg (255 lb 8.2 oz)     Body mass index is 34.61 kg/m².      Intake/Output Summary (Last 24 hours) at 03/10/18  1354  Last data filed at 03/10/18 1300   Gross per 24 hour   Intake             3973 ml   Output             5742 ml   Net            -1769 ml     Hemodynamic Parameters:    Physical Exam   Constitutional: He appears well-developed and well-nourished. No distress.   Intubated and sedated   HENT:   Head: Normocephalic and atraumatic.   Neck: Normal range of motion. No JVD present.   Cardiovascular: Normal rate.  Exam reveals no friction rub.    No murmur heard.  Chest open   Pulmonary/Chest: Effort normal. No respiratory distress. He has no wheezes.   Abdominal: Soft. He exhibits no distension.   Musculoskeletal: Normal range of motion. He exhibits no edema.   Neurological: He is alert.   Skin: Skin is warm. Capillary refill takes 2 to 3 seconds. He is not diaphoretic. No erythema.   L CFA site clean and dry, no hematoma noted.   Psychiatric: He has a normal mood and affect. His behavior is normal. Judgment and thought content normal.     Significant Labs:  CBC:    Recent Labs  Lab 03/10/18  0400 03/10/18  0739 03/10/18  1059   WBC 13.77* 13.58* 13.27*   RBC 3.12* 3.05* 3.05*   HGB 8.8* 8.6* 8.6*   HCT 26.1* 25.5* 25.8*   * 163 117*   MCV 84 84 85   MCH 28.2 28.2 28.2   MCHC 33.7 33.7 33.3     BNP:    Recent Labs  Lab 03/05/18  0820 03/07/18  0258 03/09/18  0308   BNP 1,270* 1,110* 1,114*     CMP:    Recent Labs  Lab 03/08/18  0212  03/09/18  0308  03/10/18  0400 03/10/18  0739 03/10/18  1059   *  < > 149*  < > 149* 149* 161*   CALCIUM 10.0  < > 9.5  < > 9.3 9.4 9.5   ALBUMIN 3.6  --  3.6  --  3.1*  --   --    PROT 7.7  --  6.2  --  6.1  --   --    *  < > 136  < > 137 136 136   K 3.3*  < > 3.4*  < > 3.8 4.1 4.2   CO2 29  < > 25  < > 27 25 25   CL 92*  < > 99  < > 98 98 98   BUN 18  < > 23*  < > 25* 27* 27*   CREATININE 1.8*  < > 2.2*  < > 2.2* 2.2* 2.2*   ALKPHOS 87  --  55  --  60  --   --    ALT 27  --  27  --  21  --   --    AST 33  --  89*  --  56*  --   --    BILITOT 1.2*  --  1.4*  --  2.0*   --   --    < > = values in this interval not displayed.   Coagulation:     Recent Labs  Lab 03/10/18  0400 03/10/18  0739 03/10/18  1059   INR 1.0 1.0 1.0   APTT 26.7 23.5 24.9     LDH:    Recent Labs  Lab 03/08/18  0212        Microbiology:  Microbiology Results (last 7 days)     Procedure Component Value Units Date/Time    IV catheter culture [[id number]] Collected:  03/07/18 1151    Order Status:  Completed Specimen:  Catheter Tip from Catheter Tip, Intrajugular Updated:  03/10/18 0659     Aerobic Culture - Cath tip No growth    Blood culture [[id number]] Collected:  03/06/18 1107    Order Status:  Completed Specimen:  Blood Updated:  03/09/18 1612     Blood Culture, Routine No Growth to date     Blood Culture, Routine No Growth to date     Blood Culture, Routine No Growth to date     Blood Culture, Routine No Growth to date    Blood culture [[id number]] Collected:  03/06/18 1107    Order Status:  Completed Specimen:  Blood Updated:  03/09/18 1612     Blood Culture, Routine No Growth to date     Blood Culture, Routine No Growth to date     Blood Culture, Routine No Growth to date     Blood Culture, Routine No Growth to date    Blood culture [[id number]]     Order Status:  Canceled Specimen:  Blood     Blood culture [[id number]]     Order Status:  Canceled Specimen:  Blood           I have reviewed all pertinent labs within the past 24 hours.    Estimated Creatinine Clearance: 53.7 mL/min (A) (based on SCr of 2.2 mg/dL (H)).    Diagnostic Results:  I have reviewed and interpreted all pertinent imaging results/findings within the past 24 hours.

## 2018-03-10 NOTE — PLAN OF CARE
Problem: Patient Care Overview  Goal: Plan of Care Review  Outcome: Ongoing (interventions implemented as appropriate)  Pt remains free from falls/injuries this shift and skin remains intact to back of heels/elbows.  Intubated SIMV 50%5 peep with NO at 5 ppm.  Chest still open; using propofol for sedation and pt following commands.  Epi, Amio, , Lasix, and Propofol infusing.  Vital signs stable throughout shift, VAD numbers WNL.  Plan to close chest this morning; consents obtained and in chart.  Wife updated on plan of care, will continue to monitor closely.  See flow sheets for VS/assessment details.

## 2018-03-10 NOTE — ASSESSMENT & PLAN NOTE
Neuro:   - Intubated  - Sedated - Propofol     Pulmonary:   - Intubated  Vent Mode: SIMV  Oxygen Concentration (%):  [50] 50  Resp Rate Total:  [17 br/min-21 br/min] 19 br/min  Vt Set:  [550 mL] 550 mL  PEEP/CPAP:  [5 cmH20] 5 cmH20  Pressure Support:  [10 cmH20] 10 cmH20  Mean Airway Pressure:  [11 znH86-47 cmH20] 11 cmH20  - Scheduled duo-nebs  - Nitric. Wean per CTS     Cardiac:  - s/p LVAD 3/8/18  - outflow tract revision on 3/9, chest remains open  - Drips: Epi, Levo, Cardene  - Wean pressors per CTS     Renal:   - CKD; baseline Cr. 1.7  - Lagunas in place  - Monitor UOP     Infectious Disease:   - Afebrile, WBC normal  - Routine emilie-op abx  - Trend WBC     Hematology/Oncology:  - Post-op H/H stable  - Trend CBC     Endocrine:  - Insuline gtt  - Endocrine on board     Fluids/Electrolytes/Nutrition/GI:   - NPO, NGT  - Trend CMP  - Replace Lytes PRN     Pain Management::   - PRN Fentanyl     Dispo:  Continue SICU care. CTS primary.

## 2018-03-10 NOTE — SUBJECTIVE & OBJECTIVE
"Interval HPI:   Overnight events:  Afebrile  Critical patient.  Chest remains open.  To close today?    Patietn remains on IIP with q1h POC  Still NPO, no hypoglycemia.    Supported by pressors and ionotropes.    BP 92/63 (BP Location: Left arm, Patient Position: Lying)   Pulse 101   Temp 100.1 °F (37.8 °C) (Core ([place]))   Resp 19   Ht 6' 1" (1.854 m)   Wt 119 kg (262 lb 5.6 oz)   SpO2 100%   BMI 34.61 kg/m²     Labs Reviewed and Include      Recent Labs  Lab 03/10/18  0400 03/10/18  0739   * 149*   CALCIUM 9.3 9.4   ALBUMIN 3.1*  --    PROT 6.1  --     136   K 3.8 4.1   CO2 27 25   CL 98 98   BUN 25* 27*   CREATININE 2.2* 2.2*   ALKPHOS 60  --    ALT 21  --    AST 56*  --    BILITOT 2.0*  --      Lab Results   Component Value Date    WBC 13.58 (H) 03/10/2018    HGB 8.6 (L) 03/10/2018    HCT 25.5 (L) 03/10/2018    MCV 84 03/10/2018     03/10/2018     No results for input(s): TSH, FREET4 in the last 168 hours.  Lab Results   Component Value Date    HGBA1C 5.5 03/08/2018       Nutritional status:   Body mass index is 34.61 kg/m².  Lab Results   Component Value Date    ALBUMIN 3.1 (L) 03/10/2018    ALBUMIN 3.6 03/09/2018    ALBUMIN 3.6 03/08/2018     Lab Results   Component Value Date    PREALBUMIN 14 (L) 03/08/2018    PREALBUMIN 29 01/23/2018       Estimated Creatinine Clearance: 53.7 mL/min (A) (based on SCr of 2.2 mg/dL (H)).    Accu-Checks  Recent Labs      03/09/18   2109  03/09/18   2206  03/09/18   2308  03/09/18   2353  03/10/18   0058  03/10/18   0202  03/10/18   0313  03/10/18   0356  03/10/18   0508  03/10/18   0601   POCTGLUCOSE  144*  138*  142*  131*  144*  141*  139*  140*  147*  140*       Current Medications and/or Treatments Impacting Glycemic Control  Immunotherapy:  Immunosuppressants     None        Steroids:   Hormones     None        Pressors:    Autonomic Drugs     Start     Stop Route Frequency Ordered    03/08/18 1445  EPINEPHrine (ADRENALIN) 4 mg in sodium " chloride 0.9% 250 mL infusion     Question Answer Comment   Titrate by: (in mcg/kg/min) 0.01    Titrate interval: (in minutes) 5    Titrate to maintain: (SBP or MAP or Cardiac Index) MAP    Greater than: (in mmHg) 60    Maximum dose: (in mcg/kg/min) 2        -- IV Continuous 03/08/18 1347    03/08/18 1445  norepinephrine 4 mg in dextrose 5% 250 mL infusion (premix) (titrating)     Question Answer Comment   Titrate by: (in mcg/kg/min) 0.01    Titrate interval: (in minutes) 5    Titrate to maintain: (MAP or SBP) MAP    Greater than: (in mmHg) 60    Maximum dose: (in mcg/kg/min) 3        -- IV Continuous 03/08/18 1347        Hyperglycemia/Diabetes Medications: Antihyperglycemics     Start     Stop Route Frequency Ordered    03/08/18 1445  insulin regular (Humulin R) 100 Units in sodium chloride 0.9% 100 mL infusion      -- IV Continuous 03/08/18 134

## 2018-03-10 NOTE — PROGRESS NOTES
[facility]  Critical Care - Surgery  Progress Note    Patient Name: Tim Richards  MRN: [id number]  Admission Date: 3/1/2018  Hospital Length of Stay: 9 days  Code Status: Full Code  Attending Provider: Yg Kaufman MD  Primary Care Provider: Ja Méndez MD   Principal Problem: Acute decompensated heart failure    Subjective:     Hospital/ICU Course:  3/8 - LVAD placement  3/9 - revision of outflow graft, chest remains open        Follow-up For: Procedure(s) (LRB):  CLOSURE-STERNAL WOUND (N/A)  Insertion-Right Ventricular Assist Device (N/A)    Post-Operative Day: Day of Surgery    Objective:     Vital Signs (Most Recent):  Temp: 100.2 °F (37.9 °C) (03/10/18 0500)  Pulse: 102 (03/10/18 0615)  Resp: 19 (03/10/18 0315)  BP: 95/64 (03/10/18 0315)  SpO2: 100 % (03/10/18 0615) Vital Signs (24h Range):  Temp:  [99.4 °F (37.4 °C)-100.2 °F (37.9 °C)] 100.2 °F (37.9 °C)  Pulse:  [101-128] 102  Resp:  [8-20] 19  SpO2:  [100 %] 100 %  BP: ()/(0-71) 95/64  Arterial Line BP: ()/(40-86) 82/73     Weight: 119 kg (262 lb 5.6 oz)  Body mass index is 34.61 kg/m².      Intake/Output Summary (Last 24 hours) at 03/10/18 0624  Last data filed at 03/10/18 0600   Gross per 24 hour   Intake             4773 ml   Output             5205 ml   Net             -432 ml       Physical Exam   Constitutional: He appears well-developed and well-nourished.   HENT:   Head: Normocephalic and atraumatic.   ETT, OGT in place   Eyes: EOM are normal. Pupils are equal, round, and reactive to light.   Neck: Neck supple.   Cardiovascular:   Mechanical hum   Pulmonary/Chest: Breath sounds normal. No respiratory distress. He has no wheezes.   Intubated. Mediastinal chest tubes x2 in place. SS output   Abdominal: Soft. He exhibits no distension. There is no tenderness.   Genitourinary:   Genitourinary Comments: Lagunas in place   Neurological:   Intubated, sedated       Vents:  Vent Mode: SIMV (03/10/18  0545)  Ventilator Initiated: Yes (03/08/18 1400)  Set Rate: 18 bmp (03/10/18 0545)  Vt Set: 550 mL (03/10/18 0545)  Pressure Support: 10 cmH20 (03/10/18 0545)  PEEP/CPAP: 5 cmH20 (03/10/18 0545)  Oxygen Concentration (%): 50 (03/10/18 0615)  Peak Airway Pressure: 22 cmH2O (03/10/18 0545)  Plateau Pressure: 20 cmH20 (03/10/18 0545)  Total Ve: 10.3 mL (03/10/18 0545)  F/VT Ratio<105 (RSBI): (!) 34.11 (03/10/18 0315)    Lines/Drains/Airways     Central Venous Catheter Line                 Introducer 03/08/18 0800 1 day         Percutaneous Central Line Insertion/Assessment - triple lumen  03/08/18 0726 right internal jugular 1 day         Pulmonary Artery Catheter Assessment  03/08/18 0726 1 day          Drain                 Chest Tube 03/08/18 1125 Mediastinal 32 Fr. 1 day         Chest Tube 03/08/18 1125 Mediastinal 32 Fr. 1 day         NG/OG Tube 03/08/18 0731 [place] sump 14 Fr. Right mouth 1 day         Urethral Catheter 03/08/18 0736 Temperature probe;Straight-tip;Non-latex 16 Fr. 1 day          Airway                 Airway - Non-Surgical 03/08/18 0824 Endotracheal Tube 1 day          Arterial Line                 Arterial Line 03/08/18 0721 Left Radial 1 day          Line                 VAD 03/08/18 1124 Left ventricular assist device HeartMate II 1 day          Peripheral Intravenous Line                 Midline Catheter Insertion/Assessment  - Single Lumen 03/07/18 1130 Right cephalic vein (lateral side of arm) 18g x 8cm 2 days         Peripheral IV - Single Lumen 03/07/18 1130 Right Forearm 2 days         Peripheral IV - Single Lumen 03/08/18 0739 Left Forearm 1 day                Significant Labs:    CBC/Anemia Profile:    Recent Labs  Lab 03/09/18 2000 03/09/18  2330 03/10/18  0400   WBC 13.09* 14.48* 13.77*   HGB 8.7* 8.7* 8.8*   HCT 25.6* 25.9* 26.1*    117* 121*   MCV 84 84 84   RDW 15.3* 15.4* 15.3*        Chemistries:    Recent Labs  Lab 03/09/18  0308  03/09/18 2000 03/09/18  4285  03/10/18  0400     < > 136 134* 137   K 3.4*  < > 4.1 3.7 3.8   CL 99  < > 99 97 98   CO2 25  < > 26 25 27   BUN 23*  < > 24* 25* 25*   CREATININE 2.2*  < > 2.1* 2.1* 2.2*   CALCIUM 9.5  < > 9.1 9.2 9.3   ALBUMIN 3.6  --   --   --  3.1*   PROT 6.2  --   --   --  6.1   BILITOT 1.4*  --   --   --  2.0*   ALKPHOS 55  --   --   --  60   ALT 27  --   --   --  21   AST 89*  --   --   --  56*   MG 2.1  < > 2.4 2.3 2.1   PHOS 3.6  < > 5.3* 5.5* 5.0*   < > = values in this interval not displayed.    All pertinent labs within the past 24 hours have been reviewed.    Significant Imaging:  I have reviewed and interpreted all pertinent imaging results/findings within the past 24 hours.    Assessment/Plan:     Chronic systolic heart failure    Neuro:   - Intubated  - Sedated - Propofol     Pulmonary:   - Intubated  Vent Mode: SIMV  Oxygen Concentration (%):  [50] 50  Resp Rate Total:  [17 br/min-21 br/min] 19 br/min  Vt Set:  [550 mL] 550 mL  PEEP/CPAP:  [5 cmH20] 5 cmH20  Pressure Support:  [10 cmH20] 10 cmH20  Mean Airway Pressure:  [11 ssF08-29 cmH20] 11 cmH20  - Scheduled duo-nebs  - Nitric. Wean per CTS     Cardiac:  - s/p LVAD 3/8/18  - outflow tract revision on 3/9, chest remains open  - Drips: Epi, Levo, Cardene  - Wean pressors per CTS     Renal:   - CKD; baseline Cr. 1.7  - Lagunas in place  - Monitor UOP     Infectious Disease:   - Afebrile, WBC normal  - Routine emilie-op abx  - Trend WBC     Hematology/Oncology:  - Post-op H/H stable  - Trend CBC     Endocrine:  - Insuline gtt  - Endocrine on board     Fluids/Electrolytes/Nutrition/GI:   - NPO, NGT  - Trend CMP  - Replace Lytes PRN     Pain Management::   - PRN Fentanyl     Dispo:  Continue SICU care. CTS primary.                 Critical care was time spent personally by me on the following activities: development of treatment plan with patient or surrogate and bedside caregivers, discussions with consultants, evaluation of patient's response to treatment,  examination of patient, ordering and performing treatments and interventions, ordering and review of laboratory studies, ordering and review of radiographic studies, pulse oximetry, re-evaluation of patient's condition.  This critical care time did not overlap with that of any other provider or involve time for any procedures.     Ross Cunningham MD  Critical Care - Surgery  [facility]

## 2018-03-10 NOTE — PROGRESS NOTES
Report given to anesthesia. Pt escorted to OR by anesthesia team. Pt connected to portable cardiac monitor. Receiving 100% FIO2 via ambu bag anesthesia MD. All VS stable. Continuous medications being infused as ordered. Family directed to the waiting room.

## 2018-03-10 NOTE — OP NOTE
DATE OF PROCEDURE:  03/09/2018    PREOPERATIVE DIAGNOSES:  1.  Status post implantation of left ventricular assist device.  2.  Diffuse coagulopathy.  3.  Temporary closure of the chest.  4.  RV dysfunction.    POSTOPERATIVE DIAGNOSES:  1.  Status post implantation of left ventricular assist device.  2.  Diffuse coagulopathy.  3.  Temporary closure of the chest.  4.  RV dysfunction.    OPERATIONS:  1.  Washout of the mediastinum.  2.  Re-revision of the outflow graft of the HeartMate II.  3.  Temporary closure of the chest.    STAFF SURGEON:  Yg Kaufman M.D.    FIRST ASSISTANT:  Hossein Segal.     SECOND ASSISTANT:  Celia Morales.    ANESTHESIA:  GETA.    ESTIMATED BLOOD LOSS:  50 mL.    KEY FINDINGS OF THE OPERATION:  1.  The outflow graft was seen to be compressing on to the RV due to being   stretched onto the right ventricle with every respiratory movement.  2.  Revision of the outflow graft was done and that resulted in significant   improvement in RV function.    INDICATION OF OPERATION:  This is a 52-year-old gentleman who underwent left   ventricular assist device placement.  Postoperatively, the chest was left open   because of RV dysfunction and diffuse coagulopathy.  Overnight, the patient did   very well and was deemed to be a good candidate for possible closure.  An   informed consent was obtained for possible closure.    DESCRIPTION OF OPERATION:  The patient was brought to the Operating Room and   placed in a supine position.  After induction of anesthesia, the area was   prepped and draped in the usual sterile fashion.  Previously placed temporary   dressing for chest closure was removed.  A chest retractor was noted.  It was   noted on BRITTANY examination that the RV function appeared significantly depressed   and on visualization of the chest, it appeared that the outflow graft was   completely going over the RV and compressing it.  Each ventilatory support was   pushing the outflow graft on  to the RV.  As a result, we decided to do the   division of the outflow graft ensuring it had more length to it thereby   preventing it to be pushed on towards the left side.  Once that was decided, we   gave 15,000 units of heparin with an ACT between 300 and 350.  Side-biting clamp   on the ascending aorta was done, which was tolerated very well.  Clamp on the   outflow graft was placed and pump was stopped.  The previously done anastomosis   was excised.  A new outflow graft was brought to the field.  It was measured to   appropriate length, beveled at 45 degrees angle and a continuous running   anastomosis was performed using 5-0 Prolene stitch, which was done in a   hemostatic manner.  Side-biting clamp was removed from the aorta.  De-airing of   the outflow graft was done.  The old graft was then disengaged from the outflow   portion of the LVAD.  Wet-to-wet connection of the outflow graft and the outflow   portion of the LVAD was carried out.  The nuts were tightened and collar was   placed to further aid in prevention of the graft flow protectors to be   dislodged.  The patient tolerated it very well.  The pump was restarted at 6000   RPMs and gradually increased.  Throughout the case, the patient was extremely   stable hemodynamically.  A test dose of protamine was given followed by half   dose of protamine to reverse the effect of systemic heparin.  Once that was   achieved, a decision was made to leave the chest open for another day and a   temporary closure of the chest was obtained using an Esmarch and Ioban to obtain   an airtight seal terminal.  Count of needles, instruments and sponges were   found to be correct.    MEDICARE ATTESTATION:  I performed all parts of the operation.  Dr. Hossein Segal acted as my first assistant.      LAUREL  dd: 03/09/2018 15:22:23 (CST)  td: 03/09/2018 22:13:35 (CST)  Doc ID   #[id number]  Job ID #[id number]    CC:

## 2018-03-10 NOTE — OP NOTE
DATE OF PROCEDURE:  03/08/2018     PREOPERATIVE DIAGNOSES:  1.  Acute-on-chronic systolic and diastolic heart failure, NYHA class IV,   INTERMACS 2.   2.  Status post intraaortic balloon pump.    3.  Paroxysmal ventricular tachyarrhythmias.  4.  Acute decompensated heart failure.    5.  Chronic kidney disease, stage III.  6.  Biventricular implantable cardioverter defibrillator in situ.  7.  Chronic systolic and diastolic heart failure.  8.  Moderate mitral regurgitation.      POSTOPERATIVE DIAGNOSES:  1.  Acute-on-chronic systolic and diastolic heart failure, NYHA class IV,   INTERMACS 2.   2.  Status post intraaortic balloon pump.    3.  Paroxysmal ventricular tachyarrhythmias.  4.  Acute decompensated heart failure.    5.  Chronic kidney disease, stage III.  6.  Biventricular implantable cardioverter defibrillator in situ.  7.  Chronic systolic and diastolic heart failure.  8.  Moderate mitral regurgitation.      OPERATION:  Implantation of intracorporeal left ventricular assist device,   HeartMate II under prevent II protocol.    STAFF SURGEON:  Yg Kaufman MD      FIRST ASSISTANT:  Hossein Segal.     SECOND ASSISTANT:  Celia Morales.    ANESTHESIA:  GETA.     ESTIMATED BLOOD LOSS:  200 mL.    KEY FINDINGS OF THE OPERATION:  1.  Extremely dilated 2.5 cm LVEDD.  2.  Moderate mitral regurgitation prior to implantation and mild after the   implantation of the left ventricular assist device.  3.  Diffuse coagulopathy.   4.  RV dysfunction.    INDICATION OF OPERATION:  This is a 52-year-old gentleman who presented with   acute decompensated heart failure.  The patient was admitted and diuresis was   initiated.  The patient was placed on intraaortic balloon pump and an emergent   selection meeting was carried out.  The patient was found to be a reasonable   candidate for destination therapy LVAD under the prevent II protocol.  Risks and   benefits were discussed at length and the patient wanted to proceed with    implantation of HeartMate II.  ____ protocol results were discussed and the   patient understood the risks, benefits and alternatives and consented to be a   participant of prevent II protocol.    DESCRIPTION OF OPERATION:  The patient was brought to the Operating Room and   placed in a supine position.  After induction of anesthesia, area was prepped   and draped in the usual sterile fashion.  An upper midline incision was made,   which was carried all the way down to the sternum.  A median sternotomy was then   performed.  The sternal edges were cauterized.  Chest retractor was placed.    Pericardium was opened up.  A pericardial well was created.  The left   hemidiaphragm was then taken down.  Preperitoneal space was dissected out for   creating an LVAD pocket.  This was tested for adequacy of size with the help of   a dummy pump.  Once that was done, systemic heparinization was carried out.  ACT   greater than 450 was obtained.  Cannulation stitches were placed.  Arterial   cannula was placed in the ascending aorta.  Venous cannula placed in the right   atrial appendage.  CO2 was used to flood the operative field to decrease the   chances of air embolism.  Once that was done, the patient was placed on full   cardiopulmonary bypass.  The heart was brought to the field by placing multiple   lap sponges behind the heart.  LV apex was identified under multiple views of   BRITTANY.  The true LV apex was cored out and submitted for pathology.  Multiple 2-0   Ethibond pledgeted stitches were placed circumferentially.  Once all the   pledgeted stitches were placed circumferentially, we ensured that there were no   major trabeculae were left inside the left ventricle.  The sewing ring was   brought to the field and the needles were passed through the sewing ring and cut   off.  The sutures were then tied down.  The high LVAD was then brought to the   field and lowered into the sewing ring and secured with 2 zip ties  followed by a   heavy tie in between.  The driveline exit site was marked at the level of the   umbilicus in the right mid clavicular line.  Using that exit site, the driveline   was brought out.  The heart was then lowered into the chest cavity and the LVAD   was placed into the LVAD pocket.  All the lap sponges behind the heart was then   removed.  The outflow graft was then brought to the field and was measured to   appropriate length.  It was beveled at 45 degrees.  Once that was achieved, a   side-biting clamp was placed on the ascending aorta and an aortotomy was made.    A continuous running anastomosis of a 5-0 Prolene was used to obtain a good   anastomosis.  Good hemostasis was ensured.  Clamp on the ascending aorta was   removed and the graft was de-aired.  Clamp was placed on the outflow graft.  In   the meantime, we had connected the outflow portion of the LVAD to   cardiopulmonary bypass machine at 150 mmHg for de-airing purposes.  Full   ventilation with nitric oxide was resumed during this time to help in further   aiding in de-airing of the heart and pulmonary circuit.  Once this was done, a   root vent was placed in the ascending aorta for further aiding in de-airing of   the heart.  Wet-to-wet connection of the outflow graft to the outflow portion of   the LVAD was carried out with Valsalva maneuver was being done to further   ensure flushing out of any intracardiac air.  Once all of that was achieved, the   connection was obtained with the still clamp present on the outflow graft.    De-airing hole was made into the outflow graft to further ensure removal of all   intracardiac air.  The driveline was connected to the console and started at   6000 RPMs to further aid in de-airing purposes.  Once we were satisfied with the   de-airing as visualized on BRITTANY, the patient was weaned off from cardiopulmonary   bypass, on inotropic support.  The patient  without any concerns.  The   de-airing  hole was then suture ligated with a pledgeted 5-0 Prolene stitch.    Once that was done, the clamp on the outflow graft was removed.  No air was   noted on the BRITTANY.  The patient was then transitioned to the LVAD with volume   loading.  The speed of the LVAD was increased all the way up to 9200 RPMs.  At   that point, the septum appeared to be midline.  Excellent filling of the pump   was noted with excellent hemodynamics.  Test dose of protamine was given   followed by full dose of protamine to reverse the effects of systemic heparin.    Midway dose, all the various catheters and cannulas were removed.  BRITTANY   examination also showed trivial to mild mitral regurgitation.  Due to diffuse   coagulopathy and RV dysfunction, decision was made to leave the chest open as a   standard operating procedure for a practice.  Two mediastinal drains were placed   and brought through separate skin incision and connected to Pleur-evac.  The   temporary closure of the chest was achieved by Esmarch and Ioban to obtain an   airtight seal.  Terminal count of needles, sponges, and instrument was found to   be correct.  The driveline exit site was sutured with the help of a 2-0 Ethibond   stitch circumferentially.  Once that was achieved, the patient was taken back   to the Intensive Care Unit in a stable condition.    MEDICARE ATTESTATION:  I performed all parts of the operation.  Dr. Hossein Segal acted as my first assistant.      AB/IN  dd: 03/09/2018 15:17:29 (CST)  td: 03/09/2018 21:10:23 (CST)  Doc ID   #[id number]  Job ID #[id number]    CC:

## 2018-03-10 NOTE — PROGRESS NOTES
Dr. Nicolas at bedside holding pressure to Lt groin, pt having irregular heart rhythm.  Labs, abg done.  Dr. Kaufman at bedside.  Pt shocked x1 synchronized 120j with external defibrillator .  EP nurse at bedside interrogating internal AICD.  Amiodarone bolus and gtt started, adjusted LVAD speed.  See chart for orders.

## 2018-03-10 NOTE — PROGRESS NOTES
Dr. Kaufman at bedside speaking with pts wife.  Md notified of uop, swan profile, vad profile, gtts, rate, and Lt calf muscle continues to twitch.

## 2018-03-10 NOTE — PROGRESS NOTES
Dr. Nicolas notified that CVP 6. MD aware of all gtts, UOP, and VS. Orders received and carried out. Will continue to monitor pt closely

## 2018-03-10 NOTE — PROGRESS NOTES
Dr. Kaufman remains at bedside.  Notified Md pts. Lt calf muscle twitching.  50mg demerol iv admin per order.

## 2018-03-10 NOTE — ASSESSMENT & PLAN NOTE
Estimated Creatinine Clearance: 53.7 mL/min (A) (based on SCr of 2.2 mg/dL (H)).  avoid hypoglycemia  Caution with insulin stacking

## 2018-03-10 NOTE — SUBJECTIVE & OBJECTIVE
Follow-up For: Procedure(s) (LRB):  CLOSURE-STERNAL WOUND (N/A)  Insertion-Right Ventricular Assist Device (N/A)    Post-Operative Day: Day of Surgery    Objective:     Vital Signs (Most Recent):  Temp: 100.2 °F (37.9 °C) (03/10/18 0500)  Pulse: 102 (03/10/18 0615)  Resp: 19 (03/10/18 0315)  BP: 95/64 (03/10/18 0315)  SpO2: 100 % (03/10/18 0615) Vital Signs (24h Range):  Temp:  [99.4 °F (37.4 °C)-100.2 °F (37.9 °C)] 100.2 °F (37.9 °C)  Pulse:  [101-128] 102  Resp:  [8-20] 19  SpO2:  [100 %] 100 %  BP: ()/(0-71) 95/64  Arterial Line BP: ()/(40-86) 82/73     Weight: 119 kg (262 lb 5.6 oz)  Body mass index is 34.61 kg/m².      Intake/Output Summary (Last 24 hours) at 03/10/18 0624  Last data filed at 03/10/18 0600   Gross per 24 hour   Intake             4773 ml   Output             5205 ml   Net             -432 ml       Physical Exam   Constitutional: He appears well-developed and well-nourished.   HENT:   Head: Normocephalic and atraumatic.   ETT, OGT in place   Eyes: EOM are normal. Pupils are equal, round, and reactive to light.   Neck: Neck supple.   Cardiovascular:   Mechanical hum   Pulmonary/Chest: Breath sounds normal. No respiratory distress. He has no wheezes.   Intubated. Mediastinal chest tubes x2 in place. SS output   Abdominal: Soft. He exhibits no distension. There is no tenderness.   Genitourinary:   Genitourinary Comments: Lagunas in place   Neurological:   Intubated, sedated       Vents:  Vent Mode: SIMV (03/10/18 0545)  Ventilator Initiated: Yes (03/08/18 1400)  Set Rate: 18 bmp (03/10/18 0545)  Vt Set: 550 mL (03/10/18 0545)  Pressure Support: 10 cmH20 (03/10/18 0545)  PEEP/CPAP: 5 cmH20 (03/10/18 0545)  Oxygen Concentration (%): 50 (03/10/18 0615)  Peak Airway Pressure: 22 cmH2O (03/10/18 0545)  Plateau Pressure: 20 cmH20 (03/10/18 0545)  Total Ve: 10.3 mL (03/10/18 0545)  F/VT Ratio<105 (RSBI): (!) 34.11 (03/10/18 0315)    Lines/Drains/Airways     Central Venous Catheter Line                  Introducer 03/08/18 0800 1 day         Percutaneous Central Line Insertion/Assessment - triple lumen  03/08/18 0726 right internal jugular 1 day         Pulmonary Artery Catheter Assessment  03/08/18 0726 1 day          Drain                 Chest Tube 03/08/18 1125 Mediastinal 32 Fr. 1 day         Chest Tube 03/08/18 1125 Mediastinal 32 Fr. 1 day         NG/OG Tube 03/08/18 0731 [place] sump 14 Fr. Right mouth 1 day         Urethral Catheter 03/08/18 0736 Temperature probe;Straight-tip;Non-latex 16 Fr. 1 day          Airway                 Airway - Non-Surgical 03/08/18 0824 Endotracheal Tube 1 day          Arterial Line                 Arterial Line 03/08/18 0721 Left Radial 1 day          Line                 VAD 03/08/18 1124 Left ventricular assist device HeartMate II 1 day          Peripheral Intravenous Line                 Midline Catheter Insertion/Assessment  - Single Lumen 03/07/18 1130 Right cephalic vein (lateral side of arm) 18g x 8cm 2 days         Peripheral IV - Single Lumen 03/07/18 1130 Right Forearm 2 days         Peripheral IV - Single Lumen 03/08/18 0739 Left Forearm 1 day                Significant Labs:    CBC/Anemia Profile:    Recent Labs  Lab 03/09/18 2000 03/09/18  2330 03/10/18  0400   WBC 13.09* 14.48* 13.77*   HGB 8.7* 8.7* 8.8*   HCT 25.6* 25.9* 26.1*    117* 121*   MCV 84 84 84   RDW 15.3* 15.4* 15.3*        Chemistries:    Recent Labs  Lab 03/09/18  0308  03/09/18 2000 03/09/18  2330 03/10/18  0400     < > 136 134* 137   K 3.4*  < > 4.1 3.7 3.8   CL 99  < > 99 97 98   CO2 25  < > 26 25 27   BUN 23*  < > 24* 25* 25*   CREATININE 2.2*  < > 2.1* 2.1* 2.2*   CALCIUM 9.5  < > 9.1 9.2 9.3   ALBUMIN 3.6  --   --   --  3.1*   PROT 6.2  --   --   --  6.1   BILITOT 1.4*  --   --   --  2.0*   ALKPHOS 55  --   --   --  60   ALT 27  --   --   --  21   AST 89*  --   --   --  56*   MG 2.1  < > 2.4 2.3 2.1   PHOS 3.6  < > 5.3* 5.5* 5.0*   < > = values in this interval not  displayed.    All pertinent labs within the past 24 hours have been reviewed.    Significant Imaging:  I have reviewed and interpreted all pertinent imaging results/findings within the past 24 hours.

## 2018-03-10 NOTE — SUBJECTIVE & OBJECTIVE
Subjective:     Post-Op Info:  Procedure(s) (LRB):  CLOSURE-STERNAL WOUND (N/A)  PLACEMENT-NEOPERICARDIUM   Day of Surgery       Interval History:     Chest closed this AM. Arrhythmias controlled on amiodarone.     Medications:  Continuous Infusions:   sodium chloride 0.9%      amiodarone in dextrose 5% 0.5 mg/min (03/10/18 1300)    DOBUTamine 10 mcg/kg/min (03/10/18 1300)    epinephrine infusion 0.6 mcg/kg/min (03/10/18 1300)    furosemide (LASIX) 1 mg/mL infusion (non-titrating) 5 mg/hr (03/10/18 1300)    insulin (HUMAN R) infusion (adults) Stopped (03/09/18 2200)    nicardipine Stopped (03/10/18 1132)    nitric oxide gas      norepinephrine bitartrate-D5W Stopped (03/09/18 1230)    propofol 30 mcg/kg/min (03/10/18 1257)     Scheduled Meds:   amiodarone in dextrose  150 mg Intravenous Once    ceFEPime (MAXIPIME) IVPB  1 g Intravenous Q12H    docusate sodium  200 mg Oral QHS    ferrous gluconate  324 mg Oral Daily with breakfast    fluconazole (DIFLUCAN) IVPB  400 mg Intravenous Q24H    mupirocin   Nasal BID    pantoprazole  40 mg Oral Daily    pantoprazole  40 mg Intravenous Daily    potassium chloride  40 mEq Intravenous Once    sodium chloride 0.9%  3 mL Intravenous Q8H     PRN Meds:sodium chloride, albumin human 5%, albuterol sulfate, bisacodyl, dextrose 50%, dextrose 50%, fentaNYL, magnesium hydroxide 400 mg/5 ml, oxyCODONE, oxyCODONE     Objective:     Vital Signs (Most Recent):  Temp: 99.8 °F (37.7 °C) (03/10/18 1115)  Pulse: 107 (03/10/18 1315)  Resp: 19 (03/10/18 0315)  BP: (!) 84/0 (03/10/18 1115)  SpO2: 99 % (03/10/18 1315) Vital Signs (24h Range):  Temp:  [99.7 °F (37.6 °C)-100.2 °F (37.9 °C)] 99.8 °F (37.7 °C)  Pulse:  [100-128] 107  Resp:  [8-19] 19  SpO2:  [94 %-100 %] 99 %  BP: (80-95)/(0-71) 84/0  Arterial Line BP: (75-88)/(49-79) 78/69       Intake/Output Summary (Last 24 hours) at 03/10/18 1341  Last data filed at 03/10/18 1300   Gross per 24 hour   Intake             3973 ml    Output             5742 ml   Net            -1769 ml       Physical Exam   Constitutional: He appears well-developed and well-nourished.   Cardiovascular: Normal rate.    Mechanical hum  Chest closed   Pulmonary/Chest: Effort normal. No respiratory distress.   ETT in place  Chest tubes with serosang output   Abdominal: Soft. He exhibits no distension.       Significant Labs:  BMP:   Recent Labs  Lab 03/10/18  1059   *      K 4.2   CL 98   CO2 25   BUN 27*   CREATININE 2.2*   CALCIUM 9.5   MG 2.2     CBC:   Recent Labs  Lab 03/10/18  1059   WBC 13.27*   RBC 3.05*   HGB 8.6*   HCT 25.8*   *   MCV 85   MCH 28.2   MCHC 33.3     Coagulation:   Recent Labs  Lab 03/10/18  1059   INR 1.0   APTT 24.9       Significant Diagnostics:  CXR: I have reviewed all pertinent results/findings within the past 24 hours    Procedure: Device Interrogation Including analysis of device parameters  Current Settings: Ventricular Assist Device  Review of device function is stable  TXP LVAD INTERROGATIONS 3/10/2018 3/10/2018 3/10/2018 3/10/2018 3/10/2018 3/10/2018 3/10/2018   Type HeartMate II HeartMate II HeartMate II HeartMate II HeartMate II HeartMate II HeartMate II   Flow 5.2 5.5 5.6 5.5 5.0 5.1 5.5   Speed 9000 9000 9000 9000 9000 9000 8990   PI 5.6 5 4.8 5.8 5.6 5.4 5.6   Power ([place]) 5.2 5.4 5.5 5.5 5.4 5.2 5.5   LSL - - 8600 8600 8600 8600 8600   Pulsatility Pulse Pulse - Pulse Pulse Pulse Pulse

## 2018-03-10 NOTE — ANESTHESIA POSTPROCEDURE EVALUATION
"Anesthesia Post Evaluation    Patient: Tim Richards    Procedure(s) Performed: Procedure(s) (LRB):  CLOSURE-STERNAL WOUND (N/A)  PLACEMENT-NEOPERICARDIUM    Final Anesthesia Type: general  Patient location during evaluation: ICU  Patient participation: No - Unable to Participate, Sedation  Level of consciousness: sedated  Post-procedure vital signs: reviewed and stable  Pain management: adequate  Airway patency: patent  PONV status at discharge: No PONV  Anesthetic complications: no      Cardiovascular status: hemodynamically stable  Respiratory status: ETT  Hydration status: euvolemic  Follow-up not needed.        Visit Vitals  BP 92/63 (BP Location: Left arm, Patient Position: Lying)   Pulse 101   Temp 37.8 °C (100.1 °F) (Core ([place]))   Resp 19   Ht 6' 1" (1.854 m)   Wt 119 kg (262 lb 5.6 oz)   SpO2 100%   BMI 34.61 kg/m²       Pain/Iker Score: Pain Assessment Performed: Yes (3/10/2018  7:00 AM)  Presence of Pain: denies (3/10/2018  7:00 AM)  Pain Rating Prior to Med Admin: 10 (3/9/2018  4:00 PM)  Pain Rating Post Med Admin: 0 (3/9/2018  4:30 PM)      "

## 2018-03-10 NOTE — ASSESSMENT & PLAN NOTE
-HeartMate II Implanted 3/8/18 as DT with repositioning of outflow graft 3/9/18.  Anticipate chest closure today  -CTS Primary  -Implanted by Dr. Kaufman  -Anticoagulation per Primary Coumadin  -Speed set at 9200, LSL 8800 rpm  -Interrogation notable for no events  -Not listed for OHTx  - On Epi,  and NO  -Amio drip for A Fibi per CTS

## 2018-03-10 NOTE — PROGRESS NOTES
Pt admitted to 6081 from OR. Pt trabnsported by anesthesia team to room. Pt connected to portable monitor for cardiac rhythm, BP, and SpO2. All VSS. Pt reciving 100% FiO2 via ambu bag by anesthesia. %PPM of NO also infusing via ETT. Upon arrival to unit, ICU RN and Rt at bedside to receive pt. Dr Nicolas notified of pts return. Pt connected to ICU monitor, all VSS Pt connected to ventilator using previous settings. Epi,  dobutamine, lasix, propofol, amio, and cardene infuising. EP paged and return call made to turn on AICD. Labs sent. See flowsheets

## 2018-03-10 NOTE — PLAN OF CARE
Problem: Patient Care Overview  Goal: Plan of Care Review  Outcome: Ongoing (interventions implemented as appropriate)  Pt is progressing with plan of care. Frequent rounds made to assess pain and safety. Pt's pain controlled with pain medication ordered at this time. Pt intubated on SMIV 5 peep, 50% FIO2, rate 18 O2 sats above 95% this shift. Continuous fluids infusing lasix @ 5, amiodarone @ 0.5, Dobutamine @ 10, propofol @ 20, Epi @ 0.06. Pt received 250cc of albumin x1. Map remains > 70. Bed locked and at lowest position. Side rails up x 2. Call light within reach. Will continue to monitor.

## 2018-03-10 NOTE — PROGRESS NOTES
[facility]  Cardiothoracic Surgery  Progress Note    Patient Name: Tim Richards  MRN: [id number]  Admission Date: 3/1/2018  Hospital Length of Stay: 9 days  Code Status: Full Code   Attending Physician: Yg Kaufman MD   Referring Provider: Amy Rhodes MD  Principal Problem:Acute decompensated heart failure    Subjective:     Post-Op Info:  Procedure(s) (LRB):  CLOSURE-STERNAL WOUND (N/A)  PLACEMENT-NEOPERICARDIUM   Day of Surgery     Subjective:     Post-Op Info:  Procedure(s) (LRB):  CLOSURE-STERNAL WOUND (N/A)  PLACEMENT-NEOPERICARDIUM   Day of Surgery       Interval History:     Chest closed this AM. Arrhythmias controlled on amiodarone.     Medications:  Continuous Infusions:   sodium chloride 0.9%      amiodarone in dextrose 5% 0.5 mg/min (03/10/18 1300)    DOBUTamine 10 mcg/kg/min (03/10/18 1300)    epinephrine infusion 0.6 mcg/kg/min (03/10/18 1300)    furosemide (LASIX) 1 mg/mL infusion (non-titrating) 5 mg/hr (03/10/18 1300)    insulin (HUMAN R) infusion (adults) Stopped (03/09/18 2200)    nicardipine Stopped (03/10/18 1132)    nitric oxide gas      norepinephrine bitartrate-D5W Stopped (03/09/18 1230)    propofol 30 mcg/kg/min (03/10/18 1257)     Scheduled Meds:   amiodarone in dextrose  150 mg Intravenous Once    ceFEPime (MAXIPIME) IVPB  1 g Intravenous Q12H    docusate sodium  200 mg Oral QHS    ferrous gluconate  324 mg Oral Daily with breakfast    fluconazole (DIFLUCAN) IVPB  400 mg Intravenous Q24H    mupirocin   Nasal BID    pantoprazole  40 mg Oral Daily    pantoprazole  40 mg Intravenous Daily    potassium chloride  40 mEq Intravenous Once    sodium chloride 0.9%  3 mL Intravenous Q8H     PRN Meds:sodium chloride, albumin human 5%, albuterol sulfate, bisacodyl, dextrose 50%, dextrose 50%, fentaNYL, magnesium hydroxide 400 mg/5 ml, oxyCODONE, oxyCODONE     Objective:     Vital Signs (Most Recent):  Temp: 99.8 °F (37.7 °C) (03/10/18 1115)  Pulse:  107 (03/10/18 1315)  Resp: 19 (03/10/18 0315)  BP: (!) 84/0 (03/10/18 1115)  SpO2: 99 % (03/10/18 1315) Vital Signs (24h Range):  Temp:  [99.7 °F (37.6 °C)-100.2 °F (37.9 °C)] 99.8 °F (37.7 °C)  Pulse:  [100-128] 107  Resp:  [8-19] 19  SpO2:  [94 %-100 %] 99 %  BP: (80-95)/(0-71) 84/0  Arterial Line BP: (75-88)/(49-79) 78/69       Intake/Output Summary (Last 24 hours) at 03/10/18 1341  Last data filed at 03/10/18 1300   Gross per 24 hour   Intake             3973 ml   Output             5742 ml   Net            -1769 ml       Physical Exam   Constitutional: He appears well-developed and well-nourished.   Cardiovascular: Normal rate.    Mechanical hum  Chest closed   Pulmonary/Chest: Effort normal. No respiratory distress.   ETT in place  Chest tubes with serosang output   Abdominal: Soft. He exhibits no distension.       Significant Labs:  BMP:   Recent Labs  Lab 03/10/18  1059   *      K 4.2   CL 98   CO2 25   BUN 27*   CREATININE 2.2*   CALCIUM 9.5   MG 2.2     CBC:   Recent Labs  Lab 03/10/18  1059   WBC 13.27*   RBC 3.05*   HGB 8.6*   HCT 25.8*   *   MCV 85   MCH 28.2   MCHC 33.3     Coagulation:   Recent Labs  Lab 03/10/18  1059   INR 1.0   APTT 24.9       Significant Diagnostics:  CXR: I have reviewed all pertinent results/findings within the past 24 hours    Procedure: Device Interrogation Including analysis of device parameters  Current Settings: Ventricular Assist Device  Review of device function is stable  TXP LVAD INTERROGATIONS 3/10/2018 3/10/2018 3/10/2018 3/10/2018 3/10/2018 3/10/2018 3/10/2018   Type HeartMate II HeartMate II HeartMate II HeartMate II HeartMate II HeartMate II HeartMate II   Flow 5.2 5.5 5.6 5.5 5.0 5.1 5.5   Speed 9000 9000 9000 9000 9000 9000 8990   PI 5.6 5 4.8 5.8 5.6 5.4 5.6   Power ([place]) 5.2 5.4 5.5 5.5 5.4 5.2 5.5   LSL - - 8600 8600 8600 8600 8600   Pulsatility Pulse Pulse - Pulse Pulse Pulse Pulse     Assessment/Plan:     * Acute decompensated heart  failure    S/p LVAD         LVAD (left ventricular assist device) present    Neuro:   - Pain mgmt: fentanyl PRN  - Sedation: propofol     Pulmonary:   - Intubated  - On nitric  - Maintain chest tubes  - Wean to extubate tomorrow AM with anesthesia staff available     Cardiac:   - Drips: dobutamine 4, epi 0.06  - No aspirin- Prevent II trial     Renal:    - /hr  - BUN/Cr stable  - Lasix gtt at 5     Hepatic:  LFTs MWF     ID:   - cefepime, fluconazole for 48 hours post chest closure     Hem/Onc:   - Monitor hgb; stable  - May start heparin gtt this PM     Endocrine:    - Insulin gtt  - Endocrine following      Fluids/Electrolytes/Nutrition/GI:   - Replace electrolytes PRN per protocol     DISPO:  - Continue SICU care              Vidhi Nicolas MD  Cardiothoracic Surgery  [facility]

## 2018-03-11 LAB
ALBUMIN SERPL BCP-MCNC: 3 G/DL
ALLENS TEST: ABNORMAL
ALP SERPL-CCNC: 63 U/L
ALT SERPL W/O P-5'-P-CCNC: 16 U/L
ANION GAP SERPL CALC-SCNC: 12 MMOL/L
ANION GAP SERPL CALC-SCNC: 13 MMOL/L
APTT BLDCRRT: 23.9 SEC
APTT BLDCRRT: 24.1 SEC
APTT BLDCRRT: 25.5 SEC
APTT BLDCRRT: 27.1 SEC
APTT BLDCRRT: 27.4 SEC
APTT BLDCRRT: 27.4 SEC
AST SERPL-CCNC: 44 U/L
BACTERIA #/AREA URNS AUTO: NORMAL /HPF
BACTERIA BLD CULT: NORMAL
BACTERIA BLD CULT: NORMAL
BASOPHILS # BLD AUTO: 0.01 K/UL
BASOPHILS # BLD AUTO: 0.01 K/UL
BASOPHILS # BLD AUTO: 0.02 K/UL
BASOPHILS # BLD AUTO: 0.02 K/UL
BASOPHILS NFR BLD: 0.1 %
BILIRUB SERPL-MCNC: 2.4 MG/DL
BILIRUB UR QL STRIP: NEGATIVE
BLD PROD TYP BPU: NORMAL
BLOOD UNIT EXPIRATION DATE: NORMAL
BLOOD UNIT TYPE CODE: 5100
BLOOD UNIT TYPE: NORMAL
BUN SERPL-MCNC: 34 MG/DL
BUN SERPL-MCNC: 37 MG/DL
BUN SERPL-MCNC: 39 MG/DL
CALCIUM SERPL-MCNC: 9.4 MG/DL
CALCIUM SERPL-MCNC: 9.5 MG/DL
CHLORIDE SERPL-SCNC: 99 MMOL/L
CLARITY UR REFRACT.AUTO: CLEAR
CO2 SERPL-SCNC: 26 MMOL/L
CO2 SERPL-SCNC: 26 MMOL/L
CO2 SERPL-SCNC: 27 MMOL/L
CODING SYSTEM: NORMAL
COLOR UR AUTO: YELLOW
CREAT SERPL-MCNC: 2.5 MG/DL
CREAT SERPL-MCNC: 2.6 MG/DL
CREAT SERPL-MCNC: 2.7 MG/DL
DELSYS: ABNORMAL
DIFFERENTIAL METHOD: ABNORMAL
DISPENSE STATUS: NORMAL
EOSINOPHIL # BLD AUTO: 0 K/UL
EOSINOPHIL NFR BLD: 0 %
EOSINOPHIL NFR BLD: 0.1 %
ERYTHROCYTE [DISTWIDTH] IN BLOOD BY AUTOMATED COUNT: 15.2 %
ERYTHROCYTE [DISTWIDTH] IN BLOOD BY AUTOMATED COUNT: 15.3 %
ERYTHROCYTE [SEDIMENTATION RATE] IN BLOOD BY WESTERGREN METHOD: 18 MM/H
EST. GFR  (AFRICAN AMERICAN): 30.2 ML/MIN/1.73 M^2
EST. GFR  (AFRICAN AMERICAN): 31.6 ML/MIN/1.73 M^2
EST. GFR  (AFRICAN AMERICAN): 33.1 ML/MIN/1.73 M^2
EST. GFR  (NON AFRICAN AMERICAN): 26.1 ML/MIN/1.73 M^2
EST. GFR  (NON AFRICAN AMERICAN): 27.3 ML/MIN/1.73 M^2
EST. GFR  (NON AFRICAN AMERICAN): 28.7 ML/MIN/1.73 M^2
FIO2: 50
FLOW: 60
FLOW: 60
GLUCOSE SERPL-MCNC: 137 MG/DL
GLUCOSE SERPL-MCNC: 145 MG/DL
GLUCOSE SERPL-MCNC: 148 MG/DL
GLUCOSE UR QL STRIP: NEGATIVE
HCO3 UR-SCNC: 26.6 MMOL/L (ref 24–28)
HCO3 UR-SCNC: 28 MMOL/L (ref 24–28)
HCO3 UR-SCNC: 28.5 MMOL/L (ref 24–28)
HCO3 UR-SCNC: 28.6 MMOL/L (ref 24–28)
HCO3 UR-SCNC: 30.5 MMOL/L (ref 24–28)
HCO3 UR-SCNC: 31.9 MMOL/L (ref 24–28)
HCT VFR BLD AUTO: 22.5 %
HCT VFR BLD AUTO: 22.5 %
HCT VFR BLD AUTO: 22.6 %
HCT VFR BLD AUTO: 23.8 %
HGB BLD-MCNC: 7.3 G/DL
HGB BLD-MCNC: 7.7 G/DL
HGB BLD-MCNC: 7.7 G/DL
HGB BLD-MCNC: 7.9 G/DL
HGB UR QL STRIP: ABNORMAL
HYALINE CASTS UR QL AUTO: 1 /LPF
IMM GRANULOCYTES # BLD AUTO: 0.1 K/UL
IMM GRANULOCYTES # BLD AUTO: 0.1 K/UL
IMM GRANULOCYTES # BLD AUTO: 0.11 K/UL
IMM GRANULOCYTES # BLD AUTO: 0.11 K/UL
IMM GRANULOCYTES NFR BLD AUTO: 0.7 %
IMM GRANULOCYTES NFR BLD AUTO: 0.8 %
INR PPP: 0.9
INR PPP: 1
KETONES UR QL STRIP: NEGATIVE
LDH SERPL L TO P-CCNC: 0.71 MMOL/L (ref 0.36–1.25)
LDH SERPL L TO P-CCNC: 0.77 MMOL/L (ref 0.36–1.25)
LDH SERPL L TO P-CCNC: 0.81 MMOL/L (ref 0.36–1.25)
LDH SERPL L TO P-CCNC: 0.94 MMOL/L (ref 0.36–1.25)
LDH SERPL L TO P-CCNC: 0.94 MMOL/L (ref 0.36–1.25)
LDH SERPL L TO P-CCNC: 380 U/L
LEUKOCYTE ESTERASE UR QL STRIP: NEGATIVE
LYMPHOCYTES # BLD AUTO: 0.5 K/UL
LYMPHOCYTES # BLD AUTO: 0.7 K/UL
LYMPHOCYTES NFR BLD: 3.6 %
LYMPHOCYTES NFR BLD: 4.8 %
LYMPHOCYTES NFR BLD: 4.8 %
LYMPHOCYTES NFR BLD: 5.1 %
MAGNESIUM SERPL-MCNC: 2.2 MG/DL
MAGNESIUM SERPL-MCNC: 2.2 MG/DL
MAGNESIUM SERPL-MCNC: 2.3 MG/DL
MAGNESIUM SERPL-MCNC: 2.4 MG/DL
MCH RBC QN AUTO: 26.4 PG
MCH RBC QN AUTO: 27.4 PG
MCH RBC QN AUTO: 28.3 PG
MCH RBC QN AUTO: 28.3 PG
MCHC RBC AUTO-ENTMCNC: 32.3 G/DL
MCHC RBC AUTO-ENTMCNC: 33.2 G/DL
MCHC RBC AUTO-ENTMCNC: 34.2 G/DL
MCHC RBC AUTO-ENTMCNC: 34.2 G/DL
MCV RBC AUTO: 82 FL
MCV RBC AUTO: 83 FL
METHEMOGLOBIN: 0.7 % (ref 0–3)
MICROSCOPIC COMMENT: NORMAL
MODE: ABNORMAL
MONOCYTES # BLD AUTO: 1.9 K/UL
MONOCYTES # BLD AUTO: 1.9 K/UL
MONOCYTES # BLD AUTO: 2 K/UL
MONOCYTES # BLD AUTO: 2 K/UL
MONOCYTES NFR BLD: 13.1 %
MONOCYTES NFR BLD: 13.2 %
NEUTROPHILS # BLD AUTO: 11.4 K/UL
NEUTROPHILS # BLD AUTO: 12 K/UL
NEUTROPHILS # BLD AUTO: 12 K/UL
NEUTROPHILS # BLD AUTO: 12.3 K/UL
NEUTROPHILS NFR BLD: 80.8 %
NEUTROPHILS NFR BLD: 81.1 %
NEUTROPHILS NFR BLD: 81.1 %
NEUTROPHILS NFR BLD: 82.4 %
NITRITE UR QL STRIP: NEGATIVE
NRBC BLD-RTO: 0 /100 WBC
NUM UNITS TRANS PACKED RBC: NORMAL
PCO2 BLDA: 35.7 MMHG (ref 35–45)
PCO2 BLDA: 38.1 MMHG (ref 35–45)
PCO2 BLDA: 39.2 MMHG (ref 35–45)
PCO2 BLDA: 40.7 MMHG (ref 35–45)
PCO2 BLDA: 41.4 MMHG (ref 35–45)
PCO2 BLDA: 45.4 MMHG (ref 35–45)
PEEP: 5
PH SMN: 7.44 [PH] (ref 7.35–7.45)
PH SMN: 7.45 [PH] (ref 7.35–7.45)
PH SMN: 7.46 [PH] (ref 7.35–7.45)
PH SMN: 7.46 [PH] (ref 7.35–7.45)
PH SMN: 7.5 [PH] (ref 7.35–7.45)
PH SMN: 7.51 [PH] (ref 7.35–7.45)
PH UR STRIP: 5 [PH] (ref 5–8)
PHOSPHATE SERPL-MCNC: 4.3 MG/DL
PHOSPHATE SERPL-MCNC: 4.5 MG/DL
PHOSPHATE SERPL-MCNC: 4.7 MG/DL
PHOSPHATE SERPL-MCNC: 4.7 MG/DL
PIP: 25
PIP: ABNORMAL
PLATELET # BLD AUTO: 127 K/UL
PLATELET # BLD AUTO: 130 K/UL
PLATELET # BLD AUTO: 133 K/UL
PLATELET # BLD AUTO: 133 K/UL
PMV BLD AUTO: 11.3 FL
PMV BLD AUTO: 11.6 FL
PMV BLD AUTO: 11.7 FL
PMV BLD AUTO: 11.7 FL
PO2 BLDA: 103 MMHG (ref 80–100)
PO2 BLDA: 114 MMHG (ref 80–100)
PO2 BLDA: 149 MMHG (ref 80–100)
PO2 BLDA: 161 MMHG (ref 80–100)
PO2 BLDA: 27 MMHG (ref 40–60)
PO2 BLDA: 96 MMHG (ref 80–100)
POC BE: 3 MMOL/L
POC BE: 4 MMOL/L
POC BE: 5 MMOL/L
POC BE: 6 MMOL/L
POC BE: 6 MMOL/L
POC BE: 9 MMOL/L
POC SATURATED O2: 100 % (ref 95–100)
POC SATURATED O2: 53 % (ref 95–100)
POC SATURATED O2: 98 % (ref 95–100)
POC SATURATED O2: 98 % (ref 95–100)
POC SATURATED O2: 99 % (ref 95–100)
POC SATURATED O2: 99 % (ref 95–100)
POC TCO2: 28 MMOL/L (ref 24–29)
POC TCO2: 29 MMOL/L (ref 23–27)
POC TCO2: 30 MMOL/L (ref 23–27)
POC TCO2: 30 MMOL/L (ref 23–27)
POC TCO2: 32 MMOL/L (ref 23–27)
POC TCO2: 33 MMOL/L (ref 23–27)
POCT GLUCOSE: 124 MG/DL (ref 70–110)
POCT GLUCOSE: 128 MG/DL (ref 70–110)
POCT GLUCOSE: 132 MG/DL (ref 70–110)
POCT GLUCOSE: 132 MG/DL (ref 70–110)
POCT GLUCOSE: 142 MG/DL (ref 70–110)
POCT GLUCOSE: 144 MG/DL (ref 70–110)
POCT GLUCOSE: 97 MG/DL (ref 70–110)
POTASSIUM SERPL-SCNC: 3.4 MMOL/L
POTASSIUM SERPL-SCNC: 3.6 MMOL/L
POTASSIUM SERPL-SCNC: 3.8 MMOL/L
PROT SERPL-MCNC: 6.4 G/DL
PROT UR QL STRIP: NEGATIVE
PROTHROMBIN TIME: 10 SEC
PROTHROMBIN TIME: 10.1 SEC
PROTHROMBIN TIME: 10.1 SEC
PROTHROMBIN TIME: 10.4 SEC
PS: 10
RBC # BLD AUTO: 2.72 M/UL
RBC # BLD AUTO: 2.72 M/UL
RBC # BLD AUTO: 2.76 M/UL
RBC # BLD AUTO: 2.88 M/UL
RBC #/AREA URNS AUTO: 1 /HPF (ref 0–4)
SAMPLE: ABNORMAL
SITE: ABNORMAL
SODIUM SERPL-SCNC: 137 MMOL/L
SODIUM SERPL-SCNC: 138 MMOL/L
SP GR UR STRIP: 1.01 (ref 1–1.03)
SP02: 100
SP02: 100
URN SPEC COLLECT METH UR: ABNORMAL
UROBILINOGEN UR STRIP-ACNC: NEGATIVE EU/DL
VT: 550
WBC # BLD AUTO: 14.09 K/UL
WBC # BLD AUTO: 14.82 K/UL
WBC # BLD AUTO: 14.82 K/UL
WBC # BLD AUTO: 14.85 K/UL
WBC #/AREA URNS AUTO: 1 /HPF (ref 0–5)

## 2018-03-11 PROCEDURE — 80048 BASIC METABOLIC PNL TOTAL CA: CPT | Mod: 91

## 2018-03-11 PROCEDURE — 85610 PROTHROMBIN TIME: CPT | Mod: 91

## 2018-03-11 PROCEDURE — 87205 SMEAR GRAM STAIN: CPT

## 2018-03-11 PROCEDURE — 99232 SBSQ HOSP IP/OBS MODERATE 35: CPT | Mod: ,,, | Performed by: INTERNAL MEDICINE

## 2018-03-11 PROCEDURE — 85610 PROTHROMBIN TIME: CPT

## 2018-03-11 PROCEDURE — 25000003 PHARM REV CODE 250: Performed by: THORACIC SURGERY (CARDIOTHORACIC VASCULAR SURGERY)

## 2018-03-11 PROCEDURE — 63600175 PHARM REV CODE 636 W HCPCS: Performed by: GENERAL PRACTICE

## 2018-03-11 PROCEDURE — 63600175 PHARM REV CODE 636 W HCPCS: Performed by: STUDENT IN AN ORGANIZED HEALTH CARE EDUCATION/TRAINING PROGRAM

## 2018-03-11 PROCEDURE — 99233 SBSQ HOSP IP/OBS HIGH 50: CPT | Mod: ,,, | Performed by: ANESTHESIOLOGY

## 2018-03-11 PROCEDURE — 92610 EVALUATE SWALLOWING FUNCTION: CPT

## 2018-03-11 PROCEDURE — 63600175 PHARM REV CODE 636 W HCPCS: Performed by: THORACIC SURGERY (CARDIOTHORACIC VASCULAR SURGERY)

## 2018-03-11 PROCEDURE — 85730 THROMBOPLASTIN TIME PARTIAL: CPT | Mod: 91

## 2018-03-11 PROCEDURE — 99231 SBSQ HOSP IP/OBS SF/LOW 25: CPT | Mod: ,,, | Performed by: INTERNAL MEDICINE

## 2018-03-11 PROCEDURE — A4216 STERILE WATER/SALINE, 10 ML: HCPCS | Performed by: THORACIC SURGERY (CARDIOTHORACIC VASCULAR SURGERY)

## 2018-03-11 PROCEDURE — 83735 ASSAY OF MAGNESIUM: CPT

## 2018-03-11 PROCEDURE — 81001 URINALYSIS AUTO W/SCOPE: CPT

## 2018-03-11 PROCEDURE — 85025 COMPLETE CBC W/AUTO DIFF WBC: CPT | Mod: 91

## 2018-03-11 PROCEDURE — 20000000 HC ICU ROOM

## 2018-03-11 PROCEDURE — 87070 CULTURE OTHR SPECIMN AEROBIC: CPT

## 2018-03-11 PROCEDURE — C9113 INJ PANTOPRAZOLE SODIUM, VIA: HCPCS | Performed by: THORACIC SURGERY (CARDIOTHORACIC VASCULAR SURGERY)

## 2018-03-11 PROCEDURE — 27000221 HC OXYGEN, UP TO 24 HOURS

## 2018-03-11 PROCEDURE — 83605 ASSAY OF LACTIC ACID: CPT

## 2018-03-11 PROCEDURE — 25000003 PHARM REV CODE 250: Performed by: STUDENT IN AN ORGANIZED HEALTH CARE EDUCATION/TRAINING PROGRAM

## 2018-03-11 PROCEDURE — 37799 UNLISTED PX VASCULAR SURGERY: CPT

## 2018-03-11 PROCEDURE — 80053 COMPREHEN METABOLIC PANEL: CPT

## 2018-03-11 PROCEDURE — 25000003 PHARM REV CODE 250: Performed by: GENERAL PRACTICE

## 2018-03-11 PROCEDURE — 84100 ASSAY OF PHOSPHORUS: CPT | Mod: 91

## 2018-03-11 PROCEDURE — 63600367 HC NITRIC OXIDE PER HOUR

## 2018-03-11 PROCEDURE — 84132 ASSAY OF SERUM POTASSIUM: CPT

## 2018-03-11 PROCEDURE — 83735 ASSAY OF MAGNESIUM: CPT | Mod: 91

## 2018-03-11 PROCEDURE — 84100 ASSAY OF PHOSPHORUS: CPT

## 2018-03-11 PROCEDURE — 87040 BLOOD CULTURE FOR BACTERIA: CPT

## 2018-03-11 PROCEDURE — 82803 BLOOD GASES ANY COMBINATION: CPT

## 2018-03-11 PROCEDURE — 83615 LACTATE (LD) (LDH) ENZYME: CPT

## 2018-03-11 PROCEDURE — 99900035 HC TECH TIME PER 15 MIN (STAT)

## 2018-03-11 PROCEDURE — 27000248 HC VAD-ADDITIONAL DAY

## 2018-03-11 PROCEDURE — 80048 BASIC METABOLIC PNL TOTAL CA: CPT

## 2018-03-11 PROCEDURE — 99900026 HC AIRWAY MAINTENANCE (STAT)

## 2018-03-11 RX ORDER — POTASSIUM CHLORIDE 20 MEQ/15ML
20 SOLUTION ORAL ONCE
Status: COMPLETED | OUTPATIENT
Start: 2018-03-11 | End: 2018-03-11

## 2018-03-11 RX ORDER — ETOMIDATE 2 MG/ML
INJECTION INTRAVENOUS
Status: COMPLETED
Start: 2018-03-11 | End: 2018-03-11

## 2018-03-11 RX ORDER — WARFARIN 1 MG/1
1 TABLET ORAL ONCE
Status: DISCONTINUED | OUTPATIENT
Start: 2018-03-11 | End: 2018-03-11

## 2018-03-11 RX ORDER — WARFARIN 1 MG/1
1 TABLET ORAL ONCE
Status: COMPLETED | OUTPATIENT
Start: 2018-03-11 | End: 2018-03-11

## 2018-03-11 RX ORDER — POTASSIUM CHLORIDE 29.8 MG/ML
40 INJECTION INTRAVENOUS ONCE
Status: COMPLETED | OUTPATIENT
Start: 2018-03-11 | End: 2018-03-11

## 2018-03-11 RX ORDER — POTASSIUM CHLORIDE 20 MEQ/1
60 TABLET, EXTENDED RELEASE ORAL ONCE
Status: COMPLETED | OUTPATIENT
Start: 2018-03-11 | End: 2018-03-11

## 2018-03-11 RX ORDER — ACETAMINOPHEN 10 MG/ML
1000 INJECTION, SOLUTION INTRAVENOUS ONCE
Status: COMPLETED | OUTPATIENT
Start: 2018-03-11 | End: 2018-03-11

## 2018-03-11 RX ORDER — SUCCINYLCHOLINE CHLORIDE 20 MG/ML
INJECTION INTRAMUSCULAR; INTRAVENOUS
Status: COMPLETED
Start: 2018-03-11 | End: 2018-03-11

## 2018-03-11 RX ADMIN — POTASSIUM CHLORIDE 60 MEQ: 1500 TABLET, EXTENDED RELEASE ORAL at 08:03

## 2018-03-11 RX ADMIN — DOCUSATE SODIUM 200 MG: 100 CAPSULE, LIQUID FILLED ORAL at 08:03

## 2018-03-11 RX ADMIN — EPINEPHRINE 0.06 MCG/KG/MIN: 1 INJECTION PARENTERAL at 11:03

## 2018-03-11 RX ADMIN — EPINEPHRINE 0.06 MCG/KG/MIN: 1 INJECTION PARENTERAL at 05:03

## 2018-03-11 RX ADMIN — PROPOFOL 30 MCG/KG/MIN: 10 INJECTION, EMULSION INTRAVENOUS at 03:03

## 2018-03-11 RX ADMIN — CEFEPIME 1 G: 1 INJECTION, POWDER, FOR SOLUTION INTRAMUSCULAR; INTRAVENOUS at 12:03

## 2018-03-11 RX ADMIN — EPINEPHRINE 0.06 MCG/KG/MIN: 1 INJECTION PARENTERAL at 02:03

## 2018-03-11 RX ADMIN — FENTANYL CITRATE 25 MCG: 50 INJECTION, SOLUTION INTRAMUSCULAR; INTRAVENOUS at 01:03

## 2018-03-11 RX ADMIN — ACETAMINOPHEN 650 MG: 650 SOLUTION ORAL at 08:03

## 2018-03-11 RX ADMIN — MUPIROCIN: 20 OINTMENT TOPICAL at 09:03

## 2018-03-11 RX ADMIN — DOBUTAMINE IN DEXTROSE 5 MCG/KG/MIN: 200 INJECTION, SOLUTION INTRAVENOUS at 05:03

## 2018-03-11 RX ADMIN — FENTANYL CITRATE 25 MCG: 50 INJECTION, SOLUTION INTRAMUSCULAR; INTRAVENOUS at 04:03

## 2018-03-11 RX ADMIN — MUPIROCIN: 20 OINTMENT TOPICAL at 08:03

## 2018-03-11 RX ADMIN — FENTANYL CITRATE 25 MCG: 50 INJECTION, SOLUTION INTRAMUSCULAR; INTRAVENOUS at 07:03

## 2018-03-11 RX ADMIN — Medication 3 ML: at 02:03

## 2018-03-11 RX ADMIN — POTASSIUM CHLORIDE 20 MEQ: 20 SOLUTION ORAL at 05:03

## 2018-03-11 RX ADMIN — OXYCODONE HYDROCHLORIDE 10 MG: 5 TABLET ORAL at 07:03

## 2018-03-11 RX ADMIN — OXYCODONE HYDROCHLORIDE 10 MG: 5 TABLET ORAL at 09:03

## 2018-03-11 RX ADMIN — ACETAMINOPHEN 1000 MG: 10 INJECTION, SOLUTION INTRAVENOUS at 11:03

## 2018-03-11 RX ADMIN — Medication 3 ML: at 06:03

## 2018-03-11 RX ADMIN — AMIODARONE HYDROCHLORIDE 0.5 MG/MIN: 1.8 INJECTION, SOLUTION INTRAVENOUS at 08:03

## 2018-03-11 RX ADMIN — PANTOPRAZOLE SODIUM 40 MG: 40 INJECTION, POWDER, FOR SOLUTION INTRAVENOUS at 08:03

## 2018-03-11 RX ADMIN — FUROSEMIDE 5 MG/HR: 10 INJECTION, SOLUTION INTRAMUSCULAR; INTRAVENOUS at 05:03

## 2018-03-11 RX ADMIN — POTASSIUM CHLORIDE 40 MEQ: 400 INJECTION, SOLUTION INTRAVENOUS at 09:03

## 2018-03-11 RX ADMIN — WARFARIN SODIUM 1 MG: 1 TABLET ORAL at 05:03

## 2018-03-11 RX ADMIN — AMIODARONE HYDROCHLORIDE 0.5 MG/MIN: 1.8 INJECTION, SOLUTION INTRAVENOUS at 10:03

## 2018-03-11 RX ADMIN — POTASSIUM CHLORIDE 40 MEQ: 400 INJECTION, SOLUTION INTRAVENOUS at 02:03

## 2018-03-11 NOTE — PROGRESS NOTES
[facility]  Cardiothoracic Surgery  Progress Note    Patient Name: Tim Richards  MRN: [id number]  Admission Date: 3/1/2018  Hospital Length of Stay: 10 days  Code Status: Full Code   Attending Physician: Yg Kaufman MD   Referring Provider: Amy Rhodes MD  Principal Problem:Acute decompensated heart failure    Subjective:     Post-Op Info:  Procedure(s) (LRB):  CLOSURE-STERNAL WOUND (N/A)  PLACEMENT-NEOPERICARDIUM   1 Day Post-Op     Subjective:     Post-Op Info:  Procedure(s) (LRB):  CLOSURE-STERNAL WOUND (N/A)  PLACEMENT-NEOPERICARDIUM   1 Day Post-Op        Interval History:   Extubated this AM. Given fluids overnight for low CVP and low UOP. Febrile overnight.       Medications:  Continuous Infusions:   sodium chloride 0.9%      amiodarone in dextrose 5% 0.5 mg/min (03/11/18 1007)    DOBUTamine 5 mcg/kg/min (03/11/18 1000)    epinephrine infusion 0.06 mcg/kg/min (03/11/18 1000)    furosemide (LASIX) 1 mg/mL infusion (non-titrating) 5 mg/hr (03/11/18 1000)    heparin (porcine) in 5 % dex 400 Units/hr (03/11/18 1000)    insulin (HUMAN R) infusion (adults) Stopped (03/09/18 2200)    nicardipine Stopped (03/10/18 1132)    nitric oxide gas       Scheduled Meds:   acetaminophen  1,000 mg Intravenous Once    ceFEPime (MAXIPIME) IVPB  1 g Intravenous Q12H    docusate sodium  200 mg Oral QHS    etomidate        ferrous gluconate  324 mg Oral Daily with breakfast    mupirocin   Nasal BID    pantoprazole  40 mg Oral Daily    pantoprazole  40 mg Intravenous Daily    potassium chloride  40 mEq Intravenous Once    sodium chloride 0.9%  3 mL Intravenous Q8H    succinylcholine        warfarin  1 mg Oral Once     PRN Meds:sodium chloride, acetaminophen, albumin human 5%, albuterol sulfate, bisacodyl, dextrose 50%, dextrose 50%, fentaNYL, magnesium hydroxide 400 mg/5 ml, oxyCODONE, oxyCODONE     Objective:     Vital Signs (Most Recent):  Temp: (!) 101.3 °F (38.5 °C) (03/11/18  0900)  Pulse: 99 (03/11/18 1000)  Resp: (!) 40 (03/11/18 0615)  BP: (!) 82/0 (03/11/18 0700)  SpO2: 99 % (03/11/18 1000) Vital Signs (24h Range):  Temp:  [99.4 °F (37.4 °C)-101.3 °F (38.5 °C)] 101.3 °F (38.5 °C)  Pulse:  [] 99  Resp:  [21-40] 40  SpO2:  [94 %-100 %] 99 %  BP: (82-91)/(0-66) 82/0  Arterial Line BP: (66-86)/(49-72) 75/61       Intake/Output Summary (Last 24 hours) at 03/11/18 1058  Last data filed at 03/11/18 1000   Gross per 24 hour   Intake          2386.32 ml   Output             3639 ml   Net         -1252.68 ml       Physical Exam   Constitutional: He appears well-developed and well-nourished. No distress.   Cardiovascular: Normal rate.    Mechanical hum   Pulmonary/Chest: Effort normal. No respiratory distress.   Chest tubes with serosang output   Abdominal: Soft. He exhibits no distension.   Neurological: He is alert.   Skin: Skin is warm and dry.       Significant Labs:  BMP:   Recent Labs  Lab 03/11/18  0733   *      K 3.4*   CL 99   CO2 26   BUN 39*   CREATININE 2.7*   CALCIUM 9.4   MG 2.3     CBC:   Recent Labs  Lab 03/11/18  0733   WBC 14.09*   RBC 2.76*   HGB 7.3*   HCT 22.6*   *   MCV 82   MCH 26.4*   MCHC 32.3     Coagulation:   Recent Labs  Lab 03/11/18  0733   INR 0.9   APTT 27.1       Significant Diagnostics:  CXR: I have reviewed all pertinent results/findings within the past 24 hours    Procedure: Device Interrogation Including analysis of device parameters  Current Settings: Ventricular Assist Device  Review of device function is stable  TXP LVAD INTERROGATIONS 3/11/2018 3/11/2018 3/11/2018 3/11/2018 3/11/2018 3/11/2018 3/11/2018   Type HeartMate II HeartMate II HeartMate II HeartMate II - - -   Flow 6.8 5.8 6.3 6.4 6.4 6.4 6.3   Speed 9000 9000 9000 9000 9000 9000 9000   PI 4.7 5.3 4.9 4.9 5.0 4.9 5.1   Power ([place]) 5.9 5.5 5.7 5.7 5.9 5.9 6.0   LSL 8600 8600 8600 8600 8600 8600 -   Pulsatility Pulse Pulse Pulse Pulse Pulse Pulse -     Assessment/Plan:      * Acute decompensated heart failure    S/p LVAD         LVAD (left ventricular assist device) present    Neuro:   - Pain mgmt: oxycodone PRN     Pulmonary:   - Extubated   - Wean supplemental oxygen and nitric as tolerated  - Difficult airway, anesthesia aware  - Frequent IS     Cardiac:   - Drips: dobutamine 4, epi 0.06  - No aspirin- Prevent II trial      Renal:    - /hr  - BUN/Cr up to 2.6  - Lasix gtt at 5  - Maintain sun     Hepatic:  LFTs MWF     ID:   - Discontinue fluconazole  - Cultures pending     Hem/Onc:   - Monitor hgb; stable  - Heparin gtt 600, PTT goal 35-45     Endocrine:    - Insulin gtt  - Endocrine following      Fluids/Electrolytes/Nutrition/GI:   - Replace electrolytes PRN per protocol  - SLP eval prior to advancing diet     DISPO:  - Continue SICU care              Vidhi Nicolas MD  Cardiothoracic Surgery  [facility]

## 2018-03-11 NOTE — PROGRESS NOTES
"[facility]  Endocrinology  Progress Note    Admit Date: 3/1/2018     Reason for Consult: Management of Hyperglycemia     Surgical Procedure and Date: 3/8/18 - LVAD placement; 3/9/18 s/p exp lap  Lab Results   Component Value Date    HGBA1C 5.5 03/08/2018       HPI: Patient is a 52 y.o. male with a diagnosis of CHF since 2011 (ICD placement with multiple interrogations) admitted in January for advanced heart failure options. Admitted with decompensated acute heart failure, elevated creatinine. Underwent LVAD 3/8/18.    Endocrinology has been consulted to assist in post op hyperglycemia.        Interval HPI:   Overnight events:  AFVSS.  Chest closed yesterday.  Patient laying in bed. Extubated today.  Notes that he feels relatively, well.  Appropriate chest discomfort.    IIP discontinued last night. BG at goal.  Has not required any insulin since gtt stopped.    Patient confirmed no history of DM.    BP (!) 82/0 (BP Location: Left arm, Patient Position: Lying)   Pulse 97   Temp (!) 100.8 °F (38.2 °C) (Core ([place]-Tito))   Resp (!) 40   Ht 6' 1" (1.854 m)   Wt 119.7 kg (263 lb 14.3 oz)   SpO2 99%   BMI 34.82 kg/m²       Labs Reviewed and Include      Recent Labs  Lab 03/11/18  0422 03/11/18  0733   *  145*  145* 137*   CALCIUM 9.5  9.5  9.5 9.4   ALBUMIN 3.0*  --    PROT 6.4  --      138  138 138   K 3.6  3.6  3.6 3.4*   CO2 27  27  27 26   CL 99  99  99 99   BUN 37*  37*  37* 39*   CREATININE 2.6*  2.6*  2.6* 2.7*   ALKPHOS 63  --    ALT 16  --    AST 44*  --    BILITOT 2.4*  --      Lab Results   Component Value Date    WBC 14.09 (H) 03/11/2018    HGB 7.3 (L) 03/11/2018    HCT 22.6 (L) 03/11/2018    MCV 82 03/11/2018     (L) 03/11/2018     No results for input(s): TSH, FREET4 in the last 168 hours.  Lab Results   Component Value Date    HGBA1C 5.5 03/08/2018       Nutritional status:   Body mass index is 34.82 kg/m².  Lab Results   Component Value Date    " ALBUMIN 3.0 (L) 03/11/2018    ALBUMIN 3.1 (L) 03/10/2018    ALBUMIN 3.6 03/09/2018     Lab Results   Component Value Date    PREALBUMIN 14 (L) 03/08/2018    PREALBUMIN 29 01/23/2018       Estimated Creatinine Clearance: 43.9 mL/min (A) (based on SCr of 2.7 mg/dL (H)).    Accu-Checks  Recent Labs      03/10/18   1617  03/10/18   1713  03/10/18   1803  03/10/18   1918  03/10/18   2025  03/11/18   0018  03/11/18   0425  03/11/18   0739  03/11/18   0913  03/11/18   1119   POCTGLUCOSE  124*  142*  149*  130*  130*  128*  144*  142*  132*  124*       Current Medications and/or Treatments Impacting Glycemic Control  Immunotherapy:  Immunosuppressants     None        Steroids:   Hormones     None        Pressors:    Autonomic Drugs     Start     Stop Route Frequency Ordered    03/11/18 0932  succinylcholine (ANECTINE) 20 mg/mL injection     Comments:  Created by cabinet override    03/11 2144   03/11/18 0932    03/08/18 1445  EPINEPHrine (ADRENALIN) 4 mg in sodium chloride 0.9% 250 mL infusion     Question Answer Comment   Titrate by: (in mcg/kg/min) 0.01    Titrate interval: (in minutes) 5    Titrate to maintain: (SBP or MAP or Cardiac Index) MAP    Greater than: (in mmHg) 60    Maximum dose: (in mcg/kg/min) 2        -- IV Continuous 03/08/18 1347        Hyperglycemia/Diabetes Medications: Antihyperglycemics     Start     Stop Route Frequency Ordered    03/08/18 1445  insulin regular (Humulin R) 100 Units in sodium chloride 0.9% 100 mL infusion      -- IV Continuous 03/08/18 1347          ASSESSMENT and PLAN    * Acute decompensated heart failure    S/p LVAD this admission  Per HTS; avoid hypoglycemia          Hyperglycemia    BG goal 140-180 while inpatient    Patient improving.  Will discontinue IV intensive protocol   BG monitoring q4hr with low correction  Patient does not have history of DM.          Acute on chronic combined systolic and diastolic heart failure    S/p LVAD this admission  Per HTS; avoid  hypoglycemia          Chronic systolic heart failure    S/p LVAD placement  Managed per primary    Avoid hypoglycemia              Magalie Verma MD  Endocrinology  [facility] BENDER, Carmencita Jimenes MD,  have personally taken the history and examined the patient and agree with the resident's note as stated above.

## 2018-03-11 NOTE — ASSESSMENT & PLAN NOTE
-HeartMate II Implanted 3/8/18 as DT with repositioning of outflow graft 3/9/18.  S/p chest closure 3/10/18  -CTS Primary  -Implanted by Dr. Kaufman  -Anticoagulation per Primary Coumadin  -Speed set at 9000, LSL 8600 rpm  -Interrogation notable for no events  -Not listed for OHTx  - On Epi,  and NO  -Amio drip for A Fib per CTS  -Remains intubated but CTS plans to extubate today

## 2018-03-11 NOTE — ASSESSMENT & PLAN NOTE
Neuro:   - Pain mgmt: oxycodone PRN     Pulmonary:   - Extubated   - Wean supplemental oxygen and nitric as tolerated  - Difficult airway, anesthesia aware  - Frequent IS     Cardiac:   - Drips: dobutamine 4, epi 0.06  - No aspirin- Prevent II trial      Renal:    - /hr  - BUN/Cr up to 2.6  - Lasix gtt at 5  - Maintain sun     Hepatic:  LFTs MWF     ID:   - Discontinue fluconazole  - Cultures pending     Hem/Onc:   - Monitor hgb; stable  - Heparin gtt 600, PTT goal 35-45     Endocrine:    - Insulin gtt  - Endocrine following      Fluids/Electrolytes/Nutrition/GI:   - Replace electrolytes PRN per protocol  - SLP eval prior to advancing diet     DISPO:  - Continue SICU care

## 2018-03-11 NOTE — SUBJECTIVE & OBJECTIVE
Interval History/Significant Events: Patient spiked temps to 101.9 overnight.  Blood cultures taken.  Decreasing dobutamine.    Follow-up For: Procedure(s) (LRB):  CLOSURE-STERNAL WOUND (N/A)  PLACEMENT-NEOPERICARDIUM    Post-Operative Day: 1 Day Post-Op    Objective:     Vital Signs (Most Recent):  Temp: (!) 101.1 °F (38.4 °C) (03/11/18 0700)  Pulse: 96 (03/11/18 0740)  Resp: (!) 40 (03/11/18 0615)  BP: (!) 82/0 (03/11/18 0700)  SpO2: 100 % (03/11/18 0740) Vital Signs (24h Range):  Temp:  [99.4 °F (37.4 °C)-101.2 °F (38.4 °C)] 101.1 °F (38.4 °C)  Pulse:  [] 96  Resp:  [21-40] 40  SpO2:  [94 %-100 %] 100 %  BP: (82-91)/(0-66) 82/0  Arterial Line BP: (66-86)/(49-74) 83/70     Weight: 119.7 kg (263 lb 14.3 oz)  Body mass index is 34.82 kg/m².      Intake/Output Summary (Last 24 hours) at 03/11/18 0801  Last data filed at 03/11/18 0700   Gross per 24 hour   Intake          2386.32 ml   Output             3159 ml   Net          -772.68 ml       Physical Exam    Constitutional: He appears well-developed and well-nourished.   HENT:   Head: Normocephalic and atraumatic.   ETT, OGT in place   Eyes: EOM are normal. Pupils are equal, round, and reactive to light.   Neck: Neck supple.   Cardiovascular:   Mechanical hum   Pulmonary/Chest: Breath sounds normal. No respiratory distress. He has no wheezes.   Intubated. Mediastinal chest tubes x2 in place. SS output   Abdominal: Soft. He exhibits no distension. There is no tenderness.   Genitourinary:   Genitourinary Comments: Lagunas in place   Neurological:   Intubated, sedated     Vents:  Vent Mode: SIMV (03/11/18 0740)  Ventilator Initiated: Yes (03/08/18 1400)  Set Rate: 18 bmp (03/11/18 0740)  Vt Set: 550 mL (03/11/18 0740)  Pressure Support: 10 cmH20 (03/11/18 0740)  PEEP/CPAP: 5 cmH20 (03/11/18 0740)  Oxygen Concentration (%): 50 (03/11/18 0740)  Peak Airway Pressure: 26 cmH2O (03/11/18 0740)  Plateau Pressure: 0 cmH20 (03/11/18 0740)  Total Ve: 10.6 mL (03/11/18  0740)  F/VT Ratio<105 (RSBI): (!) 36.52 (03/11/18 0523)    Lines/Drains/Airways     Central Venous Catheter Line                 Introducer 03/08/18 0800 2 days         Percutaneous Central Line Insertion/Assessment - triple lumen  03/08/18 0726 right internal jugular 2 days         Pulmonary Artery Catheter Assessment  03/08/18 0726 2 days          Drain                 Chest Tube 03/08/18 1125 1 Right Mediastinal 32 Fr. 2 days         Chest Tube 03/08/18 1125 2 Left Mediastinal 32 Fr. 2 days         NG/OG Tube 03/08/18 0731 Riley sump 14 Fr. Right mouth 2 days         Urethral Catheter 03/08/18 0736 Temperature probe;Straight-tip;Non-latex 16 Fr. 2 days          Airway                 Airway - Non-Surgical 03/08/18 0824 Endotracheal Tube 2 days          Arterial Line                 Arterial Line 03/08/18 0721 Left Radial 2 days          Line                 VAD 03/08/18 1124 Left ventricular assist device HeartMate II 2 days          Peripheral Intravenous Line                 Midline Catheter Insertion/Assessment  - Single Lumen 03/07/18 1130 Right cephalic vein (lateral side of arm) 18g x 8cm 3 days         Peripheral IV - Single Lumen 03/08/18 0739 Left Forearm 2 days                Significant Labs:    CBC/Anemia Profile:    Recent Labs  Lab 03/11/18  0014 03/11/18  0422 03/11/18  0733   WBC 14.85* 14.82*  14.82* 14.09*   HGB 7.9* 7.7*  7.7* 7.3*   HCT 23.8* 22.5*  22.5* 22.6*   * 133*  133* 127*   MCV 83 83  83 82   RDW 15.3* 15.2*  15.2* 15.2*        Chemistries:    Recent Labs  Lab 03/10/18  0400  03/10/18  2012 03/11/18  0014 03/11/18  0422     < > 137 137 138  138  138   K 3.8  < > 4.0 3.8 3.6  3.6  3.6   CL 98  < > 99 99 99  99  99   CO2 27  < > 27 26 27  27  27   BUN 25*  < > 31* 34* 37*  37*  37*   CREATININE 2.2*  < > 2.3* 2.5* 2.6*  2.6*  2.6*   CALCIUM 9.3  < > 9.4 9.5 9.5  9.5  9.5   ALBUMIN 3.1*  --   --   --  3.0*   PROT 6.1  --   --   --  6.4   BILITOT 2.0*  --    --   --  2.4*   ALKPHOS 60  --   --   --  63   ALT 21  --   --   --  16   AST 56*  --   --   --  44*   MG 2.1  < > 2.1 2.2  2.2 2.3   PHOS 5.0*  < > 4.5 4.7*  4.7* 4.5   < > = values in this interval not displayed.    Blood Culture: No results for input(s): LABBLOO in the last 48 hours.  CMP:   Recent Labs  Lab 03/10/18  0400  03/10/18  2012 03/11/18  0014 03/11/18  0422     < > 137 137 138  138  138   K 3.8  < > 4.0 3.8 3.6  3.6  3.6   CL 98  < > 99 99 99  99  99   CO2 27  < > 27 26 27  27  27   *  < > 147* 148* 145*  145*  145*   BUN 25*  < > 31* 34* 37*  37*  37*   CREATININE 2.2*  < > 2.3* 2.5* 2.6*  2.6*  2.6*   CALCIUM 9.3  < > 9.4 9.5 9.5  9.5  9.5   PROT 6.1  --   --   --  6.4   ALBUMIN 3.1*  --   --   --  3.0*   BILITOT 2.0*  --   --   --  2.4*   ALKPHOS 60  --   --   --  63   AST 56*  --   --   --  44*   ALT 21  --   --   --  16   ANIONGAP 12  < > 11 12 12  12  12   EGFRNONAA 33.4*  < > 31.7* 28.7* 27.3*  27.3*  27.3*   < > = values in this interval not displayed.  Coagulation:   Recent Labs  Lab 03/11/18  0733   INR 0.9   APTT 27.1       Significant Imaging:  I have reviewed and interpreted all pertinent imaging results/findings within the past 24 hours.

## 2018-03-11 NOTE — PLAN OF CARE
Problem: Patient Care Overview  Goal: Plan of Care Review  Outcome: Ongoing (interventions implemented as appropriate)  Pt is progressing with plan of care. Frequent rounds made to assess pain and safety. Pt's pain controlled with pain medication ordered at this time.pt extubated today, NAD noted. O2 sats remain above 95% on 5L NC with 10 of nitric. Labs drawn and orders carried out as ordered. Family is at the bedside. Bed locked and at lowest position. Side rails up x 2. Call light within reach. Will continue to monitor.

## 2018-03-11 NOTE — PROGRESS NOTES
[facility]  Critical Care - Surgery  Progress Note    Patient Name: Tim Richards  MRN: [id number]  Admission Date: 3/1/2018  Hospital Length of Stay: 10 days  Code Status: Full Code  Attending Provider: Yg Kaufman MD  Primary Care Provider: Ja Méndez MD   Principal Problem: Acute decompensated heart failure    Subjective:     Hospital/ICU Course:  3/8 - LVAD placement  3/9 - revision of outflow graft, chest remains open    Interval History/Significant Events: Patient spiked temps to 101.9 overnight.  Blood cultures taken.  Decreasing dobutamine.    Follow-up For: Procedure(s) (LRB):  CLOSURE-STERNAL WOUND (N/A)  PLACEMENT-NEOPERICARDIUM    Post-Operative Day: 1 Day Post-Op    Objective:     Vital Signs (Most Recent):  Temp: (!) 101.1 °F (38.4 °C) (03/11/18 0700)  Pulse: 96 (03/11/18 0740)  Resp: (!) 40 (03/11/18 0615)  BP: (!) 82/0 (03/11/18 0700)  SpO2: 100 % (03/11/18 0740) Vital Signs (24h Range):  Temp:  [99.4 °F (37.4 °C)-101.2 °F (38.4 °C)] 101.1 °F (38.4 °C)  Pulse:  [] 96  Resp:  [21-40] 40  SpO2:  [94 %-100 %] 100 %  BP: (82-91)/(0-66) 82/0  Arterial Line BP: (66-86)/(49-74) 83/70     Weight: 119.7 kg (263 lb 14.3 oz)  Body mass index is 34.82 kg/m².      Intake/Output Summary (Last 24 hours) at 03/11/18 0801  Last data filed at 03/11/18 0700   Gross per 24 hour   Intake          2386.32 ml   Output             3159 ml   Net          -772.68 ml       Physical Exam    Constitutional: He appears well-developed and well-nourished.   HENT:   Head: Normocephalic and atraumatic.   ETT, OGT in place   Eyes: EOM are normal. Pupils are equal, round, and reactive to light.   Neck: Neck supple.   Cardiovascular:   Mechanical hum   Pulmonary/Chest: Breath sounds normal. No respiratory distress. He has no wheezes.   Intubated. Mediastinal chest tubes x2 in place. SS output   Abdominal: Soft. He exhibits no distension. There is no tenderness.   Genitourinary:   Genitourinary  Comments: Lagunas in place   Neurological:   Intubated, sedated     Vents:  Vent Mode: SIMV (03/11/18 0740)  Ventilator Initiated: Yes (03/08/18 1400)  Set Rate: 18 bmp (03/11/18 0740)  Vt Set: 550 mL (03/11/18 0740)  Pressure Support: 10 cmH20 (03/11/18 0740)  PEEP/CPAP: 5 cmH20 (03/11/18 0740)  Oxygen Concentration (%): 50 (03/11/18 0740)  Peak Airway Pressure: 26 cmH2O (03/11/18 0740)  Plateau Pressure: 0 cmH20 (03/11/18 0740)  Total Ve: 10.6 mL (03/11/18 0740)  F/VT Ratio<105 (RSBI): (!) 36.52 (03/11/18 0523)    Lines/Drains/Airways     Central Venous Catheter Line                 Introducer 03/08/18 0800 2 days         Percutaneous Central Line Insertion/Assessment - triple lumen  03/08/18 0726 right internal jugular 2 days         Pulmonary Artery Catheter Assessment  03/08/18 0726 2 days          Drain                 Chest Tube 03/08/18 1125 1 Right Mediastinal 32 Fr. 2 days         Chest Tube 03/08/18 1125 2 Left Mediastinal 32 Fr. 2 days         NG/OG Tube 03/08/18 0731 [place] sump 14 Fr. Right mouth 2 days         Urethral Catheter 03/08/18 0736 Temperature probe;Straight-tip;Non-latex 16 Fr. 2 days          Airway                 Airway - Non-Surgical 03/08/18 0824 Endotracheal Tube 2 days          Arterial Line                 Arterial Line 03/08/18 0721 Left Radial 2 days          Line                 VAD 03/08/18 1124 Left ventricular assist device HeartMate II 2 days          Peripheral Intravenous Line                 Midline Catheter Insertion/Assessment  - Single Lumen 03/07/18 1130 Right cephalic vein (lateral side of arm) 18g x 8cm 3 days         Peripheral IV - Single Lumen 03/08/18 0739 Left Forearm 2 days                Significant Labs:    CBC/Anemia Profile:    Recent Labs  Lab 03/11/18  0014 03/11/18  0422 03/11/18  0733   WBC 14.85* 14.82*  14.82* 14.09*   HGB 7.9* 7.7*  7.7* 7.3*   HCT 23.8* 22.5*  22.5* 22.6*   * 133*  133* 127*   MCV 83 83  83 82   RDW 15.3* 15.2*  15.2*  15.2*        Chemistries:    Recent Labs  Lab 03/10/18  0400  03/10/18  2012 03/11/18  0014 03/11/18  0422     < > 137 137 138  138  138   K 3.8  < > 4.0 3.8 3.6  3.6  3.6   CL 98  < > 99 99 99  99  99   CO2 27  < > 27 26 27  27  27   BUN 25*  < > 31* 34* 37*  37*  37*   CREATININE 2.2*  < > 2.3* 2.5* 2.6*  2.6*  2.6*   CALCIUM 9.3  < > 9.4 9.5 9.5  9.5  9.5   ALBUMIN 3.1*  --   --   --  3.0*   PROT 6.1  --   --   --  6.4   BILITOT 2.0*  --   --   --  2.4*   ALKPHOS 60  --   --   --  63   ALT 21  --   --   --  16   AST 56*  --   --   --  44*   MG 2.1  < > 2.1 2.2  2.2 2.3   PHOS 5.0*  < > 4.5 4.7*  4.7* 4.5   < > = values in this interval not displayed.    Blood Culture: No results for input(s): LABBLOO in the last 48 hours.  CMP:   Recent Labs  Lab 03/10/18  0400  03/10/18  2012 03/11/18  0014 03/11/18  0422     < > 137 137 138  138  138   K 3.8  < > 4.0 3.8 3.6  3.6  3.6   CL 98  < > 99 99 99  99  99   CO2 27  < > 27 26 27  27  27   *  < > 147* 148* 145*  145*  145*   BUN 25*  < > 31* 34* 37*  37*  37*   CREATININE 2.2*  < > 2.3* 2.5* 2.6*  2.6*  2.6*   CALCIUM 9.3  < > 9.4 9.5 9.5  9.5  9.5   PROT 6.1  --   --   --  6.4   ALBUMIN 3.1*  --   --   --  3.0*   BILITOT 2.0*  --   --   --  2.4*   ALKPHOS 60  --   --   --  63   AST 56*  --   --   --  44*   ALT 21  --   --   --  16   ANIONGAP 12  < > 11 12 12  12  12   EGFRNONAA 33.4*  < > 31.7* 28.7* 27.3*  27.3*  27.3*   < > = values in this interval not displayed.  Coagulation:   Recent Labs  Lab 03/11/18  0733   INR 0.9   APTT 27.1       Significant Imaging:  I have reviewed and interpreted all pertinent imaging results/findings within the past 24 hours.    Assessment/Plan:     Chronic systolic heart failure    Neuro:   - Intubated  - Sedated - Propofol     Pulmonary:   - Intubated  Vent Mode: SIMV  Oxygen Concentration (%):  [50] 50  Resp Rate Total:  [17 br/min-30 br/min] 18 br/min  Vt Set:  [550 mL] 550  mL  PEEP/CPAP:  [5 cmH20] 5 cmH20  Pressure Support:  [10 cmH20] 10 cmH20  Mean Airway Pressure:  [11 knH90-89 cmH20] 12 cmH20  - Scheduled duo-nebs  - Nitric. Wean per CTS     Cardiac:  - s/p LVAD 3/8/18  - outflow tract revision on 3/9, chest remains open  - Drips: Epi, Levo, dobutamine  - Wean pressors per CTS     Renal:   - CKD; baseline Cr. 1.7   - Lagunas in place  - Monitor UOP     Infectious Disease:   - Fevers overnight , WBC elevated to 14   -Blood cultures taken   - Routine emilie-op abx  - Trend WBC     Hematology/Oncology:  - Post-op H/H stable  - Trend CBC     Endocrine:  - Insuline gtt  - Endocrine on board     Fluids/Electrolytes/Nutrition/GI:   - NPO, NGT  - Trend CMP  - Replace Lytes PRN     Pain Management::   - PRN Fentanyl     Dispo:  Continue SICU care. CTS primary.                   Critical care was time spent personally by me on the following activities: development of treatment plan with patient or surrogate and bedside caregivers, discussions with consultants, evaluation of patient's response to treatment, examination of patient, ordering and performing treatments and interventions, ordering and review of laboratory studies, ordering and review of radiographic studies, pulse oximetry, re-evaluation of patient's condition.  This critical care time did not overlap with that of any other provider or involve time for any procedures.     Raz Omer MD  Critical Care - Surgery  [facility]

## 2018-03-11 NOTE — ASSESSMENT & PLAN NOTE
Neuro:   - Intubated  - Sedated - Propofol     Pulmonary:   - Intubated  Vent Mode: SIMV  Oxygen Concentration (%):  [50] 50  Resp Rate Total:  [17 br/min-30 br/min] 18 br/min  Vt Set:  [550 mL] 550 mL  PEEP/CPAP:  [5 cmH20] 5 cmH20  Pressure Support:  [10 cmH20] 10 cmH20  Mean Airway Pressure:  [11 mpD61-27 cmH20] 12 cmH20  - Scheduled duo-nebs  - Nitric. Wean per CTS     Cardiac:  - s/p LVAD 3/8/18  - outflow tract revision on 3/9, chest remains open  - Drips: Epi, Levo, dobutamine  - Wean pressors per CTS     Renal:   - CKD; baseline Cr. 1.7   - Lagunas in place  - Monitor UOP     Infectious Disease:   - Fevers overnight , WBC elevated to 14   -Blood cultures taken   - Routine emilie-op abx  - Trend WBC     Hematology/Oncology:  - Post-op H/H stable  - Trend CBC     Endocrine:  - Insuline gtt  - Endocrine on board     Fluids/Electrolytes/Nutrition/GI:   - NPO, NGT  - Trend CMP  - Replace Lytes PRN     Pain Management::   - PRN Fentanyl     Dispo:  Continue SICU care. CTS primary.

## 2018-03-11 NOTE — PT/OT/SLP EVAL
"Speech Language Pathology Evaluation  Bedside Swallow    Patient Name:  Tim Richards   MRN:  [id number]  Admitting Diagnosis: Acute decompensated heart failure s/p LVAD     Recommendations:                 General Recommendations:  Dysphagia therapy  Diet recommendations:  Dental Soft, Thin   Aspiration Precautions: 1 bite/sip at a time, Eliminate distractions, HOB to 90 degrees, Meds whole 1 at a time, Small bites/sips and Standard aspiration precautions   General Precautions: Standard, fall, aspiration  Communication strategies:  none    History:     Past Medical History:   Diagnosis Date    CHF (congestive heart failure)     Dilated cardiomyopathy 1/10/2018    Disorder of kidney and ureter     CKD    Gout     HTN (hypertension)     Ventricular tachycardia (paroxysmal)        Past Surgical History:   Procedure Laterality Date    CARDIAC CATHETERIZATION  Dec. 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    Subjective     "Oh I could  again if you let me eat"   Spouse at bedside     Pain/Comfort:  · Pain Rating 1: 0/10  · Pain Rating Post-Intervention 1: 0/10    Objective:     Oral Musculature Evaluation  · Oral Musculature: WFL, general weakness  · Dentition: present and adequate  · Secretion Management: adequate  · Mucosal Quality: good  · Oral Labial Strength and Mobility: WFL  · Lingual Strength and Mobility: WFL  · Volitional " "Cough: strong and productive   · Volitional Swallow: prompt; adeqaute ROM to SLP digital palpation   · Voice Prior to PO Intake: strong and clear s/p extubation 4 hours prior     Bedside Swallow Eval:   Consistencies Assessed:  · Thin liquids 7oz via small single sips from a straw initially and then consumed consecutive sips  · Puree 2oz via full tsp   · Solids x2- Pt reporting texture "very dry" and that a liquid was needed to "wash it all down"      Oral Phase:   · WFL    Pharyngeal Phase:   · no overt clinical signs/symptoms of aspiration  · no overt clinical signs/symptoms of pharyngeal dysphagia   · Pt remains high risk for aspiration within first 24 hours of extubation and should be closely monitored     Compensatory Strategies  · None    Treatment: education provided to Pt rE: role of SLP within acute care, diet recommendations, swallow anatomy and function and potential aspiration risk s/p extubation. Both spouse, Pt and RN demonstrated understanding and agreement with plan    Assessment:     Tim Richards is a 52 y.o. male with an SLP diagnosis of  Essentially normal oral and pharyngeal swallow function. However, Pt s/p extubation 4 hours prior to assessment yet with strong clear vocal quality. Pt appearing safe to cautiously advance diet to thin liquids small single sips at a time and soft solids. Meds whole 1 at a time.  Standard aspiration precautions to be in place. Speech to follow .           Goals:    SLP Goals        Problem: SLP Goal    Goal Priority Disciplines Outcome   SLP Goal     SLP    Description:  Speech Language Pathology Goals  Goals expected to be met by 3/18    1. Pt will tolerate diet of thin liquids and soft solids without overt clinical signs of aspiration   2. Pt will tolerate trials of regular solids within speech therapy sessions to help determine least restrictive diet                       Plan:     · Patient to be seen:      · Plan of Care expires:  04/09/18  · Plan of " Care reviewed with:      · SLP Follow-Up:  Yes       Discharge recommendations:   (TBD)   Barriers to Discharge:  None per ST discipline     Time Tracking:     SLP Treatment Date:   03/11/18  Speech Start Time:  1411  Speech Stop Time:  1423     Speech Total Time (min):  12 min    Billable Minutes: Eval Swallow and Oral Function 12    Ayesha Vora CCC-SLP  03/11/2018

## 2018-03-11 NOTE — SUBJECTIVE & OBJECTIVE
"Interval HPI:   Overnight events:  AFVSS.  Chest closed yesterday.  Patient laying in bed. Extubated today.  Notes that he feels relatively, well.  Appropriate chest discomfort.    IIP discontinued last night. BG at goal.  Has not required any insulin since gtt stopped.    Patient confirmed no history of DM.    BP (!) 82/0 (BP Location: Left arm, Patient Position: Lying)   Pulse 97   Temp (!) 100.8 °F (38.2 °C) (Core ([place]))   Resp (!) 40   Ht 6' 1" (1.854 m)   Wt 119.7 kg (263 lb 14.3 oz)   SpO2 99%   BMI 34.82 kg/m²     Labs Reviewed and Include      Recent Labs  Lab 03/11/18  0422 03/11/18  0733   *  145*  145* 137*   CALCIUM 9.5  9.5  9.5 9.4   ALBUMIN 3.0*  --    PROT 6.4  --      138  138 138   K 3.6  3.6  3.6 3.4*   CO2 27  27  27 26   CL 99  99  99 99   BUN 37*  37*  37* 39*   CREATININE 2.6*  2.6*  2.6* 2.7*   ALKPHOS 63  --    ALT 16  --    AST 44*  --    BILITOT 2.4*  --      Lab Results   Component Value Date    WBC 14.09 (H) 03/11/2018    HGB 7.3 (L) 03/11/2018    HCT 22.6 (L) 03/11/2018    MCV 82 03/11/2018     (L) 03/11/2018     No results for input(s): TSH, FREET4 in the last 168 hours.  Lab Results   Component Value Date    HGBA1C 5.5 03/08/2018       Nutritional status:   Body mass index is 34.82 kg/m².  Lab Results   Component Value Date    ALBUMIN 3.0 (L) 03/11/2018    ALBUMIN 3.1 (L) 03/10/2018    ALBUMIN 3.6 03/09/2018     Lab Results   Component Value Date    PREALBUMIN 14 (L) 03/08/2018    PREALBUMIN 29 01/23/2018       Estimated Creatinine Clearance: 43.9 mL/min (A) (based on SCr of 2.7 mg/dL (H)).    Accu-Checks  Recent Labs      03/10/18   1617  03/10/18   1713  03/10/18   1803  03/10/18   1918  03/10/18   2025  03/11/18   0018  03/11/18   0425  03/11/18   0739  03/11/18   0913  03/11/18   1119   POCTGLUCOSE  124*  142*  149*  130*  130*  128*  144*  142*  132*  124*       Current Medications and/or Treatments Impacting Glycemic " Control  Immunotherapy:  Immunosuppressants     None        Steroids:   Hormones     None        Pressors:    Autonomic Drugs     Start     Stop Route Frequency Ordered    03/11/18 0932  succinylcholine (ANECTINE) 20 mg/mL injection     Comments:  Created by cabinet override    03/11 2144 03/11/18 0932 03/08/18 1445  EPINEPHrine (ADRENALIN) 4 mg in sodium chloride 0.9% 250 mL infusion     Question Answer Comment   Titrate by: (in mcg/kg/min) 0.01    Titrate interval: (in minutes) 5    Titrate to maintain: (SBP or MAP or Cardiac Index) MAP    Greater than: (in mmHg) 60    Maximum dose: (in mcg/kg/min) 2        -- IV Continuous 03/08/18 1347        Hyperglycemia/Diabetes Medications: Antihyperglycemics     Start     Stop Route Frequency Ordered    03/08/18 1445  insulin regular (Humulin R) 100 Units in sodium chloride 0.9% 100 mL infusion      -- IV Continuous 03/08/18 134

## 2018-03-11 NOTE — SUBJECTIVE & OBJECTIVE
Interval History: Intubated. Off sedation- following commands    Continuous Infusions:   sodium chloride 0.9%      amiodarone in dextrose 5% 0.5 mg/min (03/11/18 1100)    DOBUTamine 5 mcg/kg/min (03/11/18 1100)    epinephrine infusion 0.06 mcg/kg/min (03/11/18 1100)    furosemide (LASIX) 1 mg/mL infusion (non-titrating) 5 mg/hr (03/11/18 1100)    heparin (porcine) in 5 % dex 600 Units/hr (03/11/18 1114)    insulin (HUMAN R) infusion (adults) Stopped (03/09/18 2200)    nicardipine Stopped (03/10/18 1132)    nitric oxide gas       Scheduled Meds:   ceFEPime (MAXIPIME) IVPB  1 g Intravenous Q12H    docusate sodium  200 mg Oral QHS    etomidate        ferrous gluconate  324 mg Oral Daily with breakfast    mupirocin   Nasal BID    pantoprazole  40 mg Oral Daily    pantoprazole  40 mg Intravenous Daily    potassium chloride  40 mEq Intravenous Once    sodium chloride 0.9%  3 mL Intravenous Q8H    succinylcholine        warfarin  1 mg Oral Once     PRN Meds:sodium chloride, acetaminophen, albumin human 5%, albuterol sulfate, bisacodyl, dextrose 50%, dextrose 50%, fentaNYL, magnesium hydroxide 400 mg/5 ml, oxyCODONE, oxyCODONE    Review of patient's allergies indicates:   Allergen Reactions    Lisinopril Anaphylaxis     Objective:     Vital Signs (Most Recent):  Temp: (!) 100.8 °F (38.2 °C) (03/11/18 1100)  Pulse: 97 (03/11/18 1100)  Resp: (!) 40 (03/11/18 0615)  BP: (!) 82/0 (03/11/18 0700)  SpO2: 99 % (03/11/18 1100) Vital Signs (24h Range):  Temp:  [99.4 °F (37.4 °C)-101.3 °F (38.5 °C)] 100.8 °F (38.2 °C)  Pulse:  [] 97  Resp:  [21-40] 40  SpO2:  [97 %-100 %] 99 %  BP: (82-91)/(0-66) 82/0  Arterial Line BP: (66-86)/(58-72) 80/64     Patient Vitals for the past 72 hrs (Last 3 readings):   Weight   03/11/18 0500 119.7 kg (263 lb 14.3 oz)   03/10/18 0318 119 kg (262 lb 5.6 oz)   03/09/18 0333 115.9 kg (255 lb 8.2 oz)     Body mass index is 34.82 kg/m².      Intake/Output Summary (Last 24 hours) at  03/11/18 1207  Last data filed at 03/11/18 1100   Gross per 24 hour   Intake          2386.32 ml   Output             3267 ml   Net          -880.68 ml     Hemodynamic Parameters:    Physical Exam   Constitutional: He appears well-developed and well-nourished. No distress.   Intubated    HENT:   Head: Normocephalic and atraumatic.   Neck: Normal range of motion. No JVD present.   Cardiovascular: Normal rate.  Exam reveals no friction rub.    No murmur heard.  Normal VAD hum   Pulmonary/Chest: Effort normal. No respiratory distress. He has no wheezes.   Abdominal: Soft. He exhibits no distension.   Musculoskeletal: Normal range of motion. He exhibits no edema.   Neurological: He is alert.   Following commands this am, off sedation   Skin: Skin is warm. Capillary refill takes 2 to 3 seconds. He is not diaphoretic. No erythema.     Significant Labs:  CBC:    Recent Labs  Lab 03/11/18  0014 03/11/18  0422 03/11/18  0733   WBC 14.85* 14.82*  14.82* 14.09*   RBC 2.88* 2.72*  2.72* 2.76*   HGB 7.9* 7.7*  7.7* 7.3*   HCT 23.8* 22.5*  22.5* 22.6*   * 133*  133* 127*   MCV 83 83  83 82   MCH 27.4 28.3  28.3 26.4*   MCHC 33.2 34.2  34.2 32.3     BNP:    Recent Labs  Lab 03/05/18  0820 03/07/18  0258 03/09/18  0308   BNP 1,270* 1,110* 1,114*     CMP:    Recent Labs  Lab 03/09/18  0308  03/10/18  0400  03/11/18  0014 03/11/18  0422 03/11/18  0733   *  < > 149*  < > 148* 145*  145*  145* 137*   CALCIUM 9.5  < > 9.3  < > 9.5 9.5  9.5  9.5 9.4   ALBUMIN 3.6  --  3.1*  --   --  3.0*  --    PROT 6.2  --  6.1  --   --  6.4  --      < > 137  < > 137 138  138  138 138   K 3.4*  < > 3.8  < > 3.8 3.6  3.6  3.6 3.4*   CO2 25  < > 27  < > 26 27  27  27 26   CL 99  < > 98  < > 99 99  99  99 99   BUN 23*  < > 25*  < > 34* 37*  37*  37* 39*   CREATININE 2.2*  < > 2.2*  < > 2.5* 2.6*  2.6*  2.6* 2.7*   ALKPHOS 55  --  60  --   --  63  --    ALT 27  --  21  --   --  16  --    AST 89*  --  56*  --   --   44*  --    BILITOT 1.4*  --  2.0*  --   --  2.4*  --    < > = values in this interval not displayed.   Coagulation:     Recent Labs  Lab 03/11/18  0014 03/11/18  0422 03/11/18  0733   INR 1.0 1.0  1.0 0.9   APTT 27.4  27.4 25.5 27.1     LDH:    Recent Labs  Lab 03/11/18  0422   *     Microbiology:  Microbiology Results (last 7 days)     Procedure Component Value Units Date/Time    Culture, Respiratory with Gram Stain [[id number]] Collected:  03/11/18 0800    Order Status:  Sent Specimen:  Respiratory from Endotracheal Aspirate Updated:  03/11/18 0900    Blood culture [[id number]] Collected:  03/11/18 0651    Order Status:  Sent Specimen:  Blood from Peripheral, Wrist, Right Updated:  03/11/18 0800    Blood culture [[id number]] Collected:  03/11/18 0650    Order Status:  Sent Specimen:  Blood from Peripheral, Hand, Right Updated:  03/11/18 0757    Blood culture [[id number]] Collected:  03/06/18 1107    Order Status:  Completed Specimen:  Blood Updated:  03/10/18 1628     Blood Culture, Routine No Growth to date     Blood Culture, Routine No Growth to date     Blood Culture, Routine No Growth to date     Blood Culture, Routine No Growth to date     Blood Culture, Routine No Growth to date    Blood culture [[id number]] Collected:  03/06/18 1107    Order Status:  Completed Specimen:  Blood Updated:  03/10/18 1628     Blood Culture, Routine No Growth to date     Blood Culture, Routine No Growth to date     Blood Culture, Routine No Growth to date     Blood Culture, Routine No Growth to date     Blood Culture, Routine No Growth to date    IV catheter culture [[id number]] Collected:  03/07/18 1151    Order Status:  Completed Specimen:  Catheter Tip from Catheter Tip, Intrajugular Updated:  03/10/18 0659     Aerobic Culture - Cath tip No growth    Blood culture [[id number]]     Order Status:  Canceled Specimen:  Blood     Blood culture [[id number]]     Order Status:  Canceled Specimen:  Blood           I have reviewed all  pertinent labs within the past 24 hours.    Estimated Creatinine Clearance: 43.9 mL/min (A) (based on SCr of 2.7 mg/dL (H)).    Diagnostic Results:  I have reviewed and interpreted all pertinent imaging results/findings within the past 24 hours.

## 2018-03-11 NOTE — PLAN OF CARE
Problem: SLP Goal  Goal: SLP Goal    Pt seen for clinical swallow evaluation at the bedside. Pt s/p extubation 4 hours prior to assessment. Pt with strong clear vocal quality. Pt appearing safe to cautiously advance diet to thin liquids small single sips at a time and soft solids. Meds whole 1 at a time.  Standard aspiration precautions to be in place. Speech to follow .    Ayesha Vora MS, CCC-SLP  Speech Language Pathologist  Pager: [phone]  Date 3/11/2018

## 2018-03-11 NOTE — ASSESSMENT & PLAN NOTE
BG goal 140-180 while inpatient    Patient improving.  Will discontinue IV intensive protocol   BG monitoring q4hr with low correction  Patient does not have history of DM.

## 2018-03-11 NOTE — PROGRESS NOTES
Pt extubated with MDs and RNs at the bedside. Pt tolerated extubation well with no distress noted. Pt placed on 5L NC and 5ppm NO. Will continue to monitor.

## 2018-03-11 NOTE — SUBJECTIVE & OBJECTIVE
Subjective:     Post-Op Info:  Procedure(s) (LRB):  CLOSURE-STERNAL WOUND (N/A)  PLACEMENT-NEOPERICARDIUM   1 Day Post-Op        Interval History:   Extubated this AM. Given fluids overnight for low CVP and low UOP. Febrile overnight.       Medications:  Continuous Infusions:   sodium chloride 0.9%      amiodarone in dextrose 5% 0.5 mg/min (03/11/18 1007)    DOBUTamine 5 mcg/kg/min (03/11/18 1000)    epinephrine infusion 0.06 mcg/kg/min (03/11/18 1000)    furosemide (LASIX) 1 mg/mL infusion (non-titrating) 5 mg/hr (03/11/18 1000)    heparin (porcine) in 5 % dex 400 Units/hr (03/11/18 1000)    insulin (HUMAN R) infusion (adults) Stopped (03/09/18 2200)    nicardipine Stopped (03/10/18 1132)    nitric oxide gas       Scheduled Meds:   acetaminophen  1,000 mg Intravenous Once    ceFEPime (MAXIPIME) IVPB  1 g Intravenous Q12H    docusate sodium  200 mg Oral QHS    etomidate        ferrous gluconate  324 mg Oral Daily with breakfast    mupirocin   Nasal BID    pantoprazole  40 mg Oral Daily    pantoprazole  40 mg Intravenous Daily    potassium chloride  40 mEq Intravenous Once    sodium chloride 0.9%  3 mL Intravenous Q8H    succinylcholine        warfarin  1 mg Oral Once     PRN Meds:sodium chloride, acetaminophen, albumin human 5%, albuterol sulfate, bisacodyl, dextrose 50%, dextrose 50%, fentaNYL, magnesium hydroxide 400 mg/5 ml, oxyCODONE, oxyCODONE     Objective:     Vital Signs (Most Recent):  Temp: (!) 101.3 °F (38.5 °C) (03/11/18 0900)  Pulse: 99 (03/11/18 1000)  Resp: (!) 40 (03/11/18 0615)  BP: (!) 82/0 (03/11/18 0700)  SpO2: 99 % (03/11/18 1000) Vital Signs (24h Range):  Temp:  [99.4 °F (37.4 °C)-101.3 °F (38.5 °C)] 101.3 °F (38.5 °C)  Pulse:  [] 99  Resp:  [21-40] 40  SpO2:  [94 %-100 %] 99 %  BP: (82-91)/(0-66) 82/0  Arterial Line BP: (66-86)/(49-72) 75/61       Intake/Output Summary (Last 24 hours) at 03/11/18 1058  Last data filed at 03/11/18 1000   Gross per 24 hour   Intake           2386.32 ml   Output             3639 ml   Net         -1252.68 ml       Physical Exam   Constitutional: He appears well-developed and well-nourished. No distress.   Cardiovascular: Normal rate.    Mechanical hum   Pulmonary/Chest: Effort normal. No respiratory distress.   Chest tubes with serosang output   Abdominal: Soft. He exhibits no distension.   Neurological: He is alert.   Skin: Skin is warm and dry.       Significant Labs:  BMP:   Recent Labs  Lab 03/11/18  0733   *      K 3.4*   CL 99   CO2 26   BUN 39*   CREATININE 2.7*   CALCIUM 9.4   MG 2.3     CBC:   Recent Labs  Lab 03/11/18  0733   WBC 14.09*   RBC 2.76*   HGB 7.3*   HCT 22.6*   *   MCV 82   MCH 26.4*   MCHC 32.3     Coagulation:   Recent Labs  Lab 03/11/18  0733   INR 0.9   APTT 27.1       Significant Diagnostics:  CXR: I have reviewed all pertinent results/findings within the past 24 hours    Procedure: Device Interrogation Including analysis of device parameters  Current Settings: Ventricular Assist Device  Review of device function is stable  TXP LVAD INTERROGATIONS 3/11/2018 3/11/2018 3/11/2018 3/11/2018 3/11/2018 3/11/2018 3/11/2018   Type HeartMate II HeartMate II HeartMate II HeartMate II - - -   Flow 6.8 5.8 6.3 6.4 6.4 6.4 6.3   Speed 9000 9000 9000 9000 9000 9000 9000   PI 4.7 5.3 4.9 4.9 5.0 4.9 5.1   Power ([place]) 5.9 5.5 5.7 5.7 5.9 5.9 6.0   LSL 8600 8600 8600 8600 8600 8600 -   Pulsatility Pulse Pulse Pulse Pulse Pulse Pulse -

## 2018-03-12 ENCOUNTER — ANESTHESIA (OUTPATIENT)
Dept: INTENSIVE CARE | Facility: HOSPITAL | Age: 53
End: 2018-03-12

## 2018-03-12 ENCOUNTER — ANESTHESIA EVENT (OUTPATIENT)
Dept: INTENSIVE CARE | Facility: HOSPITAL | Age: 53
End: 2018-03-12

## 2018-03-12 DIAGNOSIS — Z95.811 LVAD (LEFT VENTRICULAR ASSIST DEVICE) PRESENT: Primary | ICD-10-CM

## 2018-03-12 DIAGNOSIS — Z00.6 EXAMINATION OF PARTICIPANT IN CLINICAL TRIAL: ICD-10-CM

## 2018-03-12 PROBLEM — D64.9 ANEMIA: Status: ACTIVE | Noted: 2018-03-12

## 2018-03-12 PROBLEM — J93.9 PNEUMOTHORAX: Status: ACTIVE | Noted: 2018-03-12

## 2018-03-12 PROBLEM — E80.6 HYPERBILIRUBINEMIA: Status: ACTIVE | Noted: 2018-03-12

## 2018-03-12 LAB
ALBUMIN SERPL BCP-MCNC: 2.7 G/DL
ALLENS TEST: ABNORMAL
ALP SERPL-CCNC: 79 U/L
ALT SERPL W/O P-5'-P-CCNC: 21 U/L
ANION GAP SERPL CALC-SCNC: 12 MMOL/L
ANISOCYTOSIS BLD QL SMEAR: SLIGHT
APTT BLDCRRT: 25 SEC
APTT BLDCRRT: 25.6 SEC
APTT BLDCRRT: 28.2 SEC
APTT BLDCRRT: <21 SEC
AST SERPL-CCNC: 55 U/L
BASOPHILS # BLD AUTO: 0.01 K/UL
BASOPHILS # BLD AUTO: 0.03 K/UL
BASOPHILS NFR BLD: 0.1 %
BASOPHILS NFR BLD: 0.2 %
BILIRUB DIRECT SERPL-MCNC: 2.8 MG/DL
BILIRUB SERPL-MCNC: 3.3 MG/DL
BLD PROD TYP BPU: NORMAL
BLOOD UNIT EXPIRATION DATE: NORMAL
BLOOD UNIT TYPE CODE: 5100
BLOOD UNIT TYPE: NORMAL
BNP SERPL-MCNC: 1419 PG/ML
BUN SERPL-MCNC: 42 MG/DL
CALCIUM SERPL-MCNC: 9.4 MG/DL
CHLORIDE SERPL-SCNC: 100 MMOL/L
CO2 SERPL-SCNC: 27 MMOL/L
CODING SYSTEM: NORMAL
CREAT SERPL-MCNC: 2.2 MG/DL
CRP SERPL-MCNC: 410.1 MG/L
DELSYS: ABNORMAL
DIFFERENTIAL METHOD: ABNORMAL
DIFFERENTIAL METHOD: ABNORMAL
DISPENSE STATUS: NORMAL
EOSINOPHIL # BLD AUTO: 0 K/UL
EOSINOPHIL # BLD AUTO: 0.1 K/UL
EOSINOPHIL NFR BLD: 0.2 %
EOSINOPHIL NFR BLD: 0.6 %
ERYTHROCYTE [DISTWIDTH] IN BLOOD BY AUTOMATED COUNT: 15.4 %
ERYTHROCYTE [DISTWIDTH] IN BLOOD BY AUTOMATED COUNT: 15.6 %
ERYTHROCYTE [SEDIMENTATION RATE] IN BLOOD BY WESTERGREN METHOD: 20 MM/H
ERYTHROCYTE [SEDIMENTATION RATE] IN BLOOD BY WESTERGREN METHOD: 22 MM/H
EST. GFR  (AFRICAN AMERICAN): 38.7 ML/MIN/1.73 M^2
EST. GFR  (NON AFRICAN AMERICAN): 33.4 ML/MIN/1.73 M^2
FLOW: 5
FLOW: 5
GLUCOSE SERPL-MCNC: 131 MG/DL
HCO3 UR-SCNC: 26.9 MMOL/L (ref 24–28)
HCO3 UR-SCNC: 29.5 MMOL/L (ref 24–28)
HCO3 UR-SCNC: 30 MMOL/L (ref 24–28)
HCO3 UR-SCNC: 30.9 MMOL/L (ref 24–28)
HCT VFR BLD AUTO: 20.5 %
HCT VFR BLD AUTO: 23.1 %
HGB BLD-MCNC: 6.7 G/DL
HGB BLD-MCNC: 7.4 G/DL
HYPOCHROMIA BLD QL SMEAR: ABNORMAL
IMM GRANULOCYTES # BLD AUTO: 0.06 K/UL
IMM GRANULOCYTES # BLD AUTO: 0.14 K/UL
IMM GRANULOCYTES NFR BLD AUTO: 0.4 %
IMM GRANULOCYTES NFR BLD AUTO: 1 %
INR PPP: 1
LDH SERPL L TO P-CCNC: 0.56 MMOL/L (ref 0.36–1.25)
LDH SERPL L TO P-CCNC: 0.62 MMOL/L (ref 0.36–1.25)
LDH SERPL L TO P-CCNC: 373 U/L
LYMPHOCYTES # BLD AUTO: 0.7 K/UL
LYMPHOCYTES # BLD AUTO: 1.1 K/UL
LYMPHOCYTES NFR BLD: 5.2 %
LYMPHOCYTES NFR BLD: 8.3 %
MAGNESIUM SERPL-MCNC: 2 MG/DL
MAGNESIUM SERPL-MCNC: 2.2 MG/DL
MAGNESIUM SERPL-MCNC: 2.3 MG/DL
MAGNESIUM SERPL-MCNC: 2.4 MG/DL
MAGNESIUM SERPL-MCNC: 2.5 MG/DL
MCH RBC QN AUTO: 26.9 PG
MCH RBC QN AUTO: 27.7 PG
MCHC RBC AUTO-ENTMCNC: 32 G/DL
MCHC RBC AUTO-ENTMCNC: 32.7 G/DL
MCV RBC AUTO: 82 FL
MCV RBC AUTO: 87 FL
METHEMOGLOBIN: 0.6 % (ref 0–3)
MODE: ABNORMAL
MODE: ABNORMAL
MONOCYTES # BLD AUTO: 1.7 K/UL
MONOCYTES # BLD AUTO: 2.1 K/UL
MONOCYTES NFR BLD: 12.8 %
MONOCYTES NFR BLD: 15.7 %
NEUTROPHILS # BLD AUTO: 10 K/UL
NEUTROPHILS # BLD AUTO: 11 K/UL
NEUTROPHILS NFR BLD: 74.2 %
NEUTROPHILS NFR BLD: 81.3 %
NRBC BLD-RTO: 0 /100 WBC
NRBC BLD-RTO: 0 /100 WBC
NUM UNITS TRANS PACKED RBC: NORMAL
OVALOCYTES BLD QL SMEAR: ABNORMAL
PCO2 BLDA: 41.1 MMHG (ref 35–45)
PCO2 BLDA: 42.9 MMHG (ref 35–45)
PCO2 BLDA: 46.4 MMHG (ref 35–45)
PCO2 BLDA: 48.2 MMHG (ref 35–45)
PH SMN: 7.4 [PH] (ref 7.35–7.45)
PH SMN: 7.42 [PH] (ref 7.35–7.45)
PH SMN: 7.43 [PH] (ref 7.35–7.45)
PH SMN: 7.45 [PH] (ref 7.35–7.45)
PHOSPHATE SERPL-MCNC: 3.2 MG/DL
PLATELET # BLD AUTO: 140 K/UL
PLATELET # BLD AUTO: 151 K/UL
PLATELET BLD QL SMEAR: ABNORMAL
PMV BLD AUTO: 10.8 FL
PMV BLD AUTO: 10.9 FL
PO2 BLDA: 106 MMHG (ref 80–100)
PO2 BLDA: 111 MMHG (ref 80–100)
PO2 BLDA: 127 MMHG (ref 80–100)
PO2 BLDA: 25 MMHG (ref 40–60)
POC BE: 3 MMOL/L
POC BE: 5 MMOL/L
POC BE: 5 MMOL/L
POC BE: 7 MMOL/L
POC SATURATED O2: 45 % (ref 95–100)
POC SATURATED O2: 98 % (ref 95–100)
POC SATURATED O2: 99 % (ref 95–100)
POC SATURATED O2: 99 % (ref 95–100)
POC TCO2: 28 MMOL/L (ref 23–27)
POC TCO2: 31 MMOL/L (ref 23–27)
POC TCO2: 31 MMOL/L (ref 24–29)
POC TCO2: 32 MMOL/L (ref 23–27)
POCT GLUCOSE: 108 MG/DL (ref 70–110)
POCT GLUCOSE: 121 MG/DL (ref 70–110)
POCT GLUCOSE: 133 MG/DL (ref 70–110)
POCT GLUCOSE: 202 MG/DL (ref 70–110)
POIKILOCYTOSIS BLD QL SMEAR: SLIGHT
POTASSIUM SERPL-SCNC: 3.4 MMOL/L
POTASSIUM SERPL-SCNC: 3.7 MMOL/L
POTASSIUM SERPL-SCNC: 3.7 MMOL/L
POTASSIUM SERPL-SCNC: 3.9 MMOL/L
POTASSIUM SERPL-SCNC: 3.9 MMOL/L
PROT SERPL-MCNC: 6.3 G/DL
PROTHROMBIN TIME: 10.3 SEC
RBC # BLD AUTO: 2.49 M/UL
RBC # BLD AUTO: 2.67 M/UL
SAMPLE: ABNORMAL
SITE: ABNORMAL
SODIUM SERPL-SCNC: 139 MMOL/L
SP02: 100
SP02: 100
TARGETS BLD QL SMEAR: ABNORMAL
VANCOMYCIN TROUGH SERPL-MCNC: <1.1 UG/ML
WBC # BLD AUTO: 13.53 K/UL
WBC # BLD AUTO: 13.55 K/UL

## 2018-03-12 PROCEDURE — 63600367 HC NITRIC OXIDE PER HOUR

## 2018-03-12 PROCEDURE — 25000003 PHARM REV CODE 250: Performed by: STUDENT IN AN ORGANIZED HEALTH CARE EDUCATION/TRAINING PROGRAM

## 2018-03-12 PROCEDURE — 63600175 PHARM REV CODE 636 W HCPCS: Performed by: THORACIC SURGERY (CARDIOTHORACIC VASCULAR SURGERY)

## 2018-03-12 PROCEDURE — 99900035 HC TECH TIME PER 15 MIN (STAT)

## 2018-03-12 PROCEDURE — 27000248 HC VAD-ADDITIONAL DAY

## 2018-03-12 PROCEDURE — 99232 SBSQ HOSP IP/OBS MODERATE 35: CPT | Mod: ,,, | Performed by: INTERNAL MEDICINE

## 2018-03-12 PROCEDURE — 80076 HEPATIC FUNCTION PANEL: CPT

## 2018-03-12 PROCEDURE — 99291 CRITICAL CARE FIRST HOUR: CPT | Mod: ,,, | Performed by: PHYSICIAN ASSISTANT

## 2018-03-12 PROCEDURE — 37799 UNLISTED PX VASCULAR SURGERY: CPT

## 2018-03-12 PROCEDURE — 63600175 PHARM REV CODE 636 W HCPCS: Mod: JG | Performed by: STUDENT IN AN ORGANIZED HEALTH CARE EDUCATION/TRAINING PROGRAM

## 2018-03-12 PROCEDURE — 84100 ASSAY OF PHOSPHORUS: CPT

## 2018-03-12 PROCEDURE — 25000003 PHARM REV CODE 250: Performed by: THORACIC SURGERY (CARDIOTHORACIC VASCULAR SURGERY)

## 2018-03-12 PROCEDURE — 86140 C-REACTIVE PROTEIN: CPT

## 2018-03-12 PROCEDURE — A4216 STERILE WATER/SALINE, 10 ML: HCPCS | Performed by: THORACIC SURGERY (CARDIOTHORACIC VASCULAR SURGERY)

## 2018-03-12 PROCEDURE — 83735 ASSAY OF MAGNESIUM: CPT | Mod: 91

## 2018-03-12 PROCEDURE — 80048 BASIC METABOLIC PNL TOTAL CA: CPT

## 2018-03-12 PROCEDURE — 84132 ASSAY OF SERUM POTASSIUM: CPT

## 2018-03-12 PROCEDURE — 63600175 PHARM REV CODE 636 W HCPCS: Performed by: STUDENT IN AN ORGANIZED HEALTH CARE EDUCATION/TRAINING PROGRAM

## 2018-03-12 PROCEDURE — 92526 ORAL FUNCTION THERAPY: CPT

## 2018-03-12 PROCEDURE — 85730 THROMBOPLASTIN TIME PARTIAL: CPT

## 2018-03-12 PROCEDURE — 93750 INTERROGATION VAD IN PERSON: CPT | Mod: ,,, | Performed by: INTERNAL MEDICINE

## 2018-03-12 PROCEDURE — 27000221 HC OXYGEN, UP TO 24 HOURS

## 2018-03-12 PROCEDURE — 63600175 PHARM REV CODE 636 W HCPCS: Performed by: GENERAL PRACTICE

## 2018-03-12 PROCEDURE — 83880 ASSAY OF NATRIURETIC PEPTIDE: CPT

## 2018-03-12 PROCEDURE — 83735 ASSAY OF MAGNESIUM: CPT

## 2018-03-12 PROCEDURE — 85610 PROTHROMBIN TIME: CPT

## 2018-03-12 PROCEDURE — 85025 COMPLETE CBC W/AUTO DIFF WBC: CPT | Mod: 91

## 2018-03-12 PROCEDURE — 84132 ASSAY OF SERUM POTASSIUM: CPT | Mod: 91

## 2018-03-12 PROCEDURE — 99232 SBSQ HOSP IP/OBS MODERATE 35: CPT | Mod: ,,, | Performed by: NURSE PRACTITIONER

## 2018-03-12 PROCEDURE — 97161 PT EVAL LOW COMPLEX 20 MIN: CPT

## 2018-03-12 PROCEDURE — 83615 LACTATE (LD) (LDH) ENZYME: CPT

## 2018-03-12 PROCEDURE — P9016 RBC LEUKOCYTES REDUCED: HCPCS

## 2018-03-12 PROCEDURE — 80202 ASSAY OF VANCOMYCIN: CPT

## 2018-03-12 PROCEDURE — 82803 BLOOD GASES ANY COMBINATION: CPT

## 2018-03-12 PROCEDURE — 83605 ASSAY OF LACTIC ACID: CPT

## 2018-03-12 PROCEDURE — 85730 THROMBOPLASTIN TIME PARTIAL: CPT | Mod: 91

## 2018-03-12 PROCEDURE — 36430 TRANSFUSION BLD/BLD COMPNT: CPT

## 2018-03-12 PROCEDURE — 20000000 HC ICU ROOM

## 2018-03-12 PROCEDURE — 99233 SBSQ HOSP IP/OBS HIGH 50: CPT | Mod: ,,, | Performed by: SURGERY

## 2018-03-12 RX ORDER — WARFARIN 2 MG/1
2 TABLET ORAL ONCE
Status: DISCONTINUED | OUTPATIENT
Start: 2018-03-12 | End: 2018-03-12

## 2018-03-12 RX ORDER — FUROSEMIDE 10 MG/ML
10 INJECTION INTRAMUSCULAR; INTRAVENOUS ONCE
Status: DISCONTINUED | OUTPATIENT
Start: 2018-03-12 | End: 2018-03-12

## 2018-03-12 RX ORDER — WARFARIN 2 MG/1
2 TABLET ORAL ONCE
Status: COMPLETED | OUTPATIENT
Start: 2018-03-12 | End: 2018-03-12

## 2018-03-12 RX ORDER — POTASSIUM CHLORIDE 14.9 MG/ML
20 INJECTION INTRAVENOUS ONCE
Status: COMPLETED | OUTPATIENT
Start: 2018-03-12 | End: 2018-03-12

## 2018-03-12 RX ORDER — IBUPROFEN 200 MG
16 TABLET ORAL
Status: DISCONTINUED | OUTPATIENT
Start: 2018-03-12 | End: 2018-03-26 | Stop reason: HOSPADM

## 2018-03-12 RX ORDER — POTASSIUM CHLORIDE 20 MEQ/1
40 TABLET, EXTENDED RELEASE ORAL ONCE
Status: COMPLETED | OUTPATIENT
Start: 2018-03-12 | End: 2018-03-12

## 2018-03-12 RX ORDER — FUROSEMIDE 10 MG/ML
10 INJECTION INTRAMUSCULAR; INTRAVENOUS ONCE
Status: COMPLETED | OUTPATIENT
Start: 2018-03-12 | End: 2018-03-12

## 2018-03-12 RX ORDER — GLUCAGON 1 MG
1 KIT INJECTION
Status: DISCONTINUED | OUTPATIENT
Start: 2018-03-12 | End: 2018-03-26 | Stop reason: HOSPADM

## 2018-03-12 RX ORDER — IBUPROFEN 200 MG
24 TABLET ORAL
Status: DISCONTINUED | OUTPATIENT
Start: 2018-03-12 | End: 2018-03-26 | Stop reason: HOSPADM

## 2018-03-12 RX ORDER — INSULIN ASPART 100 [IU]/ML
0-5 INJECTION, SOLUTION INTRAVENOUS; SUBCUTANEOUS
Status: DISCONTINUED | OUTPATIENT
Start: 2018-03-12 | End: 2018-03-26 | Stop reason: HOSPADM

## 2018-03-12 RX ORDER — AMIODARONE HYDROCHLORIDE 200 MG/1
400 TABLET ORAL 2 TIMES DAILY
Status: DISCONTINUED | OUTPATIENT
Start: 2018-03-12 | End: 2018-03-15

## 2018-03-12 RX ORDER — POLYETHYLENE GLYCOL 3350 17 G/17G
17 POWDER, FOR SOLUTION ORAL DAILY
Status: DISCONTINUED | OUTPATIENT
Start: 2018-03-12 | End: 2018-03-26 | Stop reason: HOSPADM

## 2018-03-12 RX ORDER — POTASSIUM CHLORIDE 29.8 MG/ML
40 INJECTION INTRAVENOUS ONCE
Status: COMPLETED | OUTPATIENT
Start: 2018-03-12 | End: 2018-03-12

## 2018-03-12 RX ADMIN — Medication 3 ML: at 02:03

## 2018-03-12 RX ADMIN — Medication 81 MG: at 10:03

## 2018-03-12 RX ADMIN — POLYETHYLENE GLYCOL 3350 17 G: 17 POWDER, FOR SOLUTION ORAL at 09:03

## 2018-03-12 RX ADMIN — ALTEPLASE 2 MG: 2.2 INJECTION, POWDER, LYOPHILIZED, FOR SOLUTION INTRAVENOUS at 10:03

## 2018-03-12 RX ADMIN — POTASSIUM CHLORIDE 20 MEQ: 200 INJECTION, SOLUTION INTRAVENOUS at 03:03

## 2018-03-12 RX ADMIN — POTASSIUM CHLORIDE 20 MEQ: 200 INJECTION, SOLUTION INTRAVENOUS at 06:03

## 2018-03-12 RX ADMIN — AMIODARONE HYDROCHLORIDE 400 MG: 200 TABLET ORAL at 09:03

## 2018-03-12 RX ADMIN — OXYCODONE HYDROCHLORIDE 10 MG: 5 TABLET ORAL at 04:03

## 2018-03-12 RX ADMIN — DOBUTAMINE IN DEXTROSE 5 MCG/KG/MIN: 200 INJECTION, SOLUTION INTRAVENOUS at 11:03

## 2018-03-12 RX ADMIN — MUPIROCIN: 20 OINTMENT TOPICAL at 09:03

## 2018-03-12 RX ADMIN — FUROSEMIDE 10 MG/HR: 10 INJECTION, SOLUTION INTRAMUSCULAR; INTRAVENOUS at 07:03

## 2018-03-12 RX ADMIN — POTASSIUM CHLORIDE 40 MEQ: 400 INJECTION, SOLUTION INTRAVENOUS at 01:03

## 2018-03-12 RX ADMIN — OXYCODONE HYDROCHLORIDE 10 MG: 5 TABLET ORAL at 12:03

## 2018-03-12 RX ADMIN — DOBUTAMINE IN DEXTROSE 5 MCG/KG/MIN: 200 INJECTION, SOLUTION INTRAVENOUS at 09:03

## 2018-03-12 RX ADMIN — EPINEPHRINE 0.06 MCG/KG/MIN: 1 INJECTION PARENTERAL at 06:03

## 2018-03-12 RX ADMIN — OXYCODONE HYDROCHLORIDE 10 MG: 5 TABLET ORAL at 10:03

## 2018-03-12 RX ADMIN — CEFEPIME 1 G: 1 INJECTION, POWDER, FOR SOLUTION INTRAMUSCULAR; INTRAVENOUS at 12:03

## 2018-03-12 RX ADMIN — WARFARIN SODIUM 2 MG: 2 TABLET ORAL at 06:03

## 2018-03-12 RX ADMIN — DOCUSATE SODIUM 200 MG: 100 CAPSULE, LIQUID FILLED ORAL at 09:03

## 2018-03-12 RX ADMIN — Medication 3 ML: at 09:03

## 2018-03-12 RX ADMIN — PANTOPRAZOLE SODIUM 40 MG: 40 TABLET, DELAYED RELEASE ORAL at 09:03

## 2018-03-12 RX ADMIN — POTASSIUM CHLORIDE 40 MEQ: 1500 TABLET, EXTENDED RELEASE ORAL at 07:03

## 2018-03-12 RX ADMIN — FUROSEMIDE 10 MG: 10 INJECTION, SOLUTION INTRAMUSCULAR; INTRAVENOUS at 07:03

## 2018-03-12 NOTE — SUBJECTIVE & OBJECTIVE
Interval History/Significant Events: Overnight 18 beat run VT.  Remains extubated.  Pain controlled with PO oxycodone.  On 5L NC satting well.  Nitric oxide set on 10ppm.  Lasix @5mg/hr and diuresing, amiodarone @ 0.5mg/min.  Dobutamine @5.  Epi @0.06.  Heparin @1100U/hr.  Chest tube output 50mL overnight.    Follow-up For: Procedure(s) (LRB):  CLOSURE-STERNAL WOUND (N/A)  PLACEMENT-NEOPERICARDIUM    Post-Operative Day: 2 Days Post-Op    Objective:     Vital Signs (Most Recent):  Temp: 100 °F (37.8 °C) (03/12/18 0300)  Pulse: 86 (03/12/18 0500)  Resp: (!) 25 (03/12/18 0500)  BP: (!) 90/0 (03/12/18 0300)  SpO2: 100 % (03/12/18 0500) Vital Signs (24h Range):  Temp:  [99.8 °F (37.7 °C)-101.3 °F (38.5 °C)] 100 °F (37.8 °C)  Pulse:  [] 86  Resp:  [3-40] 25  SpO2:  [94 %-100 %] 100 %  BP: (82-90)/(0-54) 90/0  Arterial Line BP: (75-93)/(60-72) 90/69     Weight: 119.7 kg (263 lb 14.3 oz)  Body mass index is 34.82 kg/m².      Intake/Output Summary (Last 24 hours) at 03/12/18 0612  Last data filed at 03/12/18 0530   Gross per 24 hour   Intake           3352.1 ml   Output             4307 ml   Net           -954.9 ml       Physical Exam   Constitutional: He is oriented to person, place, and time. He appears well-developed and well-nourished.   HENT:   Head: Normocephalic and atraumatic.   Eyes: EOM are normal. Pupils are equal, round, and reactive to light.   Neck: Neck supple.   Cardiovascular:   Mechanical hum   Pulmonary/Chest: Breath sounds normal. No respiratory distress. He has no wheezes.   Mediastinal chest tubes x2 in place. SS output   Abdominal: Soft. He exhibits no distension. There is no tenderness.   Genitourinary:   Genitourinary Comments: [place] in place   Neurological: He is alert and oriented to person, place, and time.   Skin: Skin is warm and dry.       Vents:  Vent Mode: SIMV (03/11/18 0740)  Ventilator Initiated: Yes (03/08/18 1400)  Set Rate: 18 bmp (03/11/18 0740)  Vt Set: 550 mL (03/11/18  0740)  Pressure Support: 10 cmH20 (03/11/18 0740)  PEEP/CPAP: 5 cmH20 (03/11/18 0740)  Oxygen Concentration (%): 50 (03/11/18 0930)  Peak Airway Pressure: 26 cmH2O (03/11/18 0740)  Plateau Pressure: 0 cmH20 (03/11/18 0740)  Total Ve: 10.6 mL (03/11/18 0740)  F/VT Ratio<105 (RSBI): (!) 36.52 (03/11/18 0523)    Lines/Drains/Airways     Central Venous Catheter Line                 Introducer 03/08/18 0800 3 days         Percutaneous Central Line Insertion/Assessment - triple lumen  03/08/18 0726 right internal jugular 3 days         Pulmonary Artery Catheter Assessment  03/08/18 0726 3 days          Drain                 Chest Tube 03/08/18 1125 1 Right Mediastinal 32 Fr. 3 days         Chest Tube 03/08/18 1125 2 Left Mediastinal 32 Fr. 3 days         Urethral Catheter 03/08/18 0736 Temperature probe;Straight-tip;Non-latex 16 Fr. 3 days          Arterial Line                 Arterial Line 03/08/18 0721 Left Radial 3 days          Line                 VAD 03/08/18 1124 Left ventricular assist device HeartMate II 3 days          Peripheral Intravenous Line                 Midline Catheter Insertion/Assessment  - Single Lumen 03/07/18 1130 Right cephalic vein (lateral side of arm) 18g x 8cm 4 days         Peripheral IV - Single Lumen 03/08/18 0739 Left Forearm 3 days                Significant Labs:    CBC/Anemia Profile:    Recent Labs  Lab 03/11/18  0422 03/11/18  0733 03/12/18  0506   WBC 14.82*  14.82* 14.09* 13.55*   HGB 7.7*  7.7* 7.3* 6.7*   HCT 22.5*  22.5* 22.6* 20.5*   *  133* 127* 140*   MCV 83  83 82 82   RDW 15.2*  15.2* 15.2* 15.4*        Chemistries:    Recent Labs  Lab 03/11/18  0422 03/11/18  0733  03/11/18  1720 03/12/18  0001 03/12/18  0506     138  138 138  --   --   --  139   K 3.6  3.6  3.6 3.4*  < > 3.6 3.4* 3.9  3.9   CL 99  99  99 99  --   --   --  100   CO2 27  27  27 26  --   --   --  27   BUN 37*  37*  37* 39*  --   --   --  42*   CREATININE 2.6*  2.6*  2.6* 2.7*   --   --   --  2.2*   CALCIUM 9.5  9.5  9.5 9.4  --   --   --  9.4   ALBUMIN 3.0*  --   --   --   --  2.7*   PROT 6.4  --   --   --   --  6.3   BILITOT 2.4*  --   --   --   --  3.3*   ALKPHOS 63  --   --   --   --  79   ALT 16  --   --   --   --  21   AST 44*  --   --   --   --  55*   MG 2.3 2.3  < > 2.4 2.4 2.3   PHOS 4.5 4.3  --   --   --  3.2   < > = values in this interval not displayed.    All pertinent labs within the past 24 hours have been reviewed.    Significant Imaging:  I have reviewed and interpreted all pertinent imaging results/findings within the past 24 hours.

## 2018-03-12 NOTE — PLAN OF CARE
Problem: Occupational Therapy Goal  Goal: Occupational Therapy Goal  Goals to be met by: 3/26/18     Patient will increase functional independence with ADLs by performing:    Feeding with [place].  UE Dressing with Supervision.  LE Dressing with Supervision.  Grooming while standing at sink with Supervision.  Toileting from toilet with Supervision for hygiene and clothing management.   Toilet transfer to toilet with Supervision.  Pt will be (I) with VAD management during ADL.    Outcome: Ongoing (interventions implemented as appropriate)  OT eval completed, and above goals established. CLIFF Garcia;  3/12/2018

## 2018-03-12 NOTE — PROGRESS NOTES
[facility]  Cardiothoracic Surgery  Progress Note    Patient Name: Tim Richards  MRN: [id number]  Admission Date: 3/1/2018  Hospital Length of Stay: 11 days  Code Status: Full Code   Attending Physician: Yg Kaufman MD   Referring Provider: Amy Rhodes MD  Principal Problem:Acute decompensated heart failure    Subjective:     Post-Op Info:  Procedure(s) (LRB):  CLOSURE-STERNAL WOUND (N/A)  PLACEMENT-NEOPERICARDIUM   2 Days Post-Op     Subjective:     Post-Op Info:  Procedure(s) (LRB):  CLOSURE-STERNAL WOUND (N/A)  PLACEMENT-NEOPERICARDIUM   2 Days Post-Op        Interval History:     NAEON. Interval enlargement of right apical pneumo. Extubated yesterday. Creatinine improving.    Medications:  Continuous Infusions:   DOBUTamine 5 mcg/kg/min (03/12/18 1000)    epinephrine infusion 0.05 mcg/kg/min (03/12/18 1000)    furosemide (LASIX) 1 mg/mL infusion (non-titrating) 5 mg/hr (03/12/18 1000)    heparin (porcine) in 5 % dex 1,100 Units/hr (03/12/18 1000)    nitric oxide gas       Scheduled Meds:   amiodarone  400 mg Oral BID    docusate sodium  200 mg Oral QHS    ferrous gluconate  324 mg Oral Daily with breakfast    INV aspirin/placebo  81 mg Oral Daily    mupirocin   Nasal BID    pantoprazole  40 mg Oral Daily    polyethylene glycol  17 g Oral Daily    sodium chloride 0.9%  3 mL Intravenous Q8H    warfarin  2 mg Oral Once     PRN Meds:acetaminophen, albumin human 5%, albuterol sulfate, bisacodyl, dextrose 50%, dextrose 50%, fentaNYL, magnesium hydroxide 400 mg/5 ml, oxyCODONE, oxyCODONE     Objective:     Vital Signs (Most Recent):  Temp: 100 °F (37.8 °C) (03/12/18 1015)  Pulse: 98 (03/12/18 1015)  Resp: (!) 31 (03/12/18 1015)  BP: (!) 90/0 (03/12/18 0300)  SpO2: 97 % (03/12/18 1015) Vital Signs (24h Range):  Temp:  [99.8 °F (37.7 °C)-100.8 °F (38.2 °C)] 100 °F (37.8 °C)  Pulse:  [85-98] 98  Resp:  [3-32] 31  SpO2:  [94 %-100 %] 97 %  BP: (82-90)/(0-54) 90/0  Arterial Line  BP: (75-93)/(53-81) 87/81       Intake/Output Summary (Last 24 hours) at 03/12/18 1036  Last data filed at 03/12/18 1000   Gross per 24 hour   Intake          3525.55 ml   Output             4655 ml   Net         -1129.45 ml       Physical Exam   Constitutional: He is oriented to person, place, and time. He appears well-developed and well-nourished. No distress.   Cardiovascular: Normal rate and regular rhythm.    Mechanical hum   Pulmonary/Chest: Effort normal. No respiratory distress.   Chest tube with serosang output   Abdominal: Soft. He exhibits no distension.   Neurological: He is alert and oriented to person, place, and time.   Skin: Skin is warm and dry.   Psychiatric: He has a normal mood and affect. His behavior is normal.       Significant Labs:  BMP:   Recent Labs  Lab 03/12/18  0506 03/12/18  0809   *  --      --    K 3.9  3.9  --      --    CO2 27  --    BUN 42*  --    CREATININE 2.2*  --    CALCIUM 9.4  --    MG 2.3 2.0     CBC:   Recent Labs  Lab 03/12/18  0506   WBC 13.55*   RBC 2.49*   HGB 6.7*   HCT 20.5*   *   MCV 82   MCH 26.9*   MCHC 32.7     Coagulation:   Recent Labs  Lab 03/12/18  0506   INR 1.0   APTT 25.0       Significant Diagnostics:  CXR: I have reviewed all pertinent results/findings within the past 24 hours    Procedure: Device Interrogation Including analysis of device parameters  Current Settings: Ventricular Assist Device  Review of device function is stable  TXP LVAD INTERROGATIONS 3/12/2018 3/12/2018 3/12/2018 3/12/2018 3/12/2018 3/12/2018 3/12/2018   Type HeartMate II HeartMate II HeartMate II HeartMate II HeartMate II HeartMate II HeartMate II   Flow 5.5 5.6 5.0 5.4 5.6 5.7 5.9   Speed 9000 9000 9000 9000 9000 9000 9000   PI 5.0 5.7 6.5 6.3 5.5 5.9 5.9   Power ([place]) 5.7 5.5 5.3 5.3 5.6 5.6 5.1   LSL 8600 8600 8600 8600 8600 8600 8600   Pulsatility Pulse Pulse Pulse Pulse Pulse Pulse Pulse     Assessment/Plan:     * Acute decompensated heart failure     S/p LVAD         LVAD (left ventricular assist device) present    Neuro:   - Pain mgmt: oxycodone PRN     Pulmonary:   - Wean supplemental oxygen and nitric   - Frequent IS  - Interventional Radiology to place pigtail in pneumo     Cardiac:   - Drips: dobutamine 4, epi 0.06  - Wean epi 0.01 q12h  - No aspirin- Prevent II trial   - PO amiodarone, d/c IV amiodarone     Renal:    - -200/hr  - BUN/Cr down to 2.2  - Lasix gtt at 5  - Maintain sun     Hepatic:  LFTs MWF     ID:   - Discontinue cefepime  - Cultures NGTD     Hem/Onc:   - Monitor hgb; stable  - Heparin gtt 600, PTT goal 40-50     Endocrine:    - Insulin gtt  - Endocrine following      Fluids/Electrolytes/Nutrition/GI:   - Replace electrolytes PRN per protocol  - Clear liquids until return of bowel function, then advance    DISPO:  - Continue SICU care              Vidhi Nicolas MD  Cardiothoracic Surgery  [facility]

## 2018-03-12 NOTE — PROGRESS NOTES
IR Note    Preliminary CT showed small amount of right pneumothorax. Seems improved compared to CXR. Procedure aborted. Recommend continued CXR f/u. If pneumo becomes large enough for intervention, tube can be placed.    Isaiah Mohan MD  Department of Radiology  Pager: [phone]

## 2018-03-12 NOTE — ASSESSMENT & PLAN NOTE
Neuro:   - Pain mgmt: oxycodone PRN     Pulmonary:   - Wean supplemental oxygen and nitric   - Frequent IS  - Interventional Radiology to place pigtail in pneumo     Cardiac:   - Drips: dobutamine 4, epi 0.06  - Wean epi 0.01 q12h  - No aspirin- Prevent II trial   - PO amiodarone, d/c IV amiodarone     Renal:    - -200/hr  - BUN/Cr down to 2.2  - Lasix gtt at 5  - Maintain sun     Hepatic:  LFTs MWF     ID:   - Discontinue cefepime  - Cultures NGTD     Hem/Onc:   - Monitor hgb; stable  - Heparin gtt 600, PTT goal 40-50     Endocrine:    - Insulin gtt  - Endocrine following      Fluids/Electrolytes/Nutrition/GI:   - Replace electrolytes PRN per protocol  - Clear liquids until return of bowel function, then advance    DISPO:  - Continue SICU care

## 2018-03-12 NOTE — PT/OT/SLP EVAL
"Occupational Therapy   Evaluation    Name: Tim Richards  MRN: [id number]  Admitting Diagnosis:  Acute decompensated heart failure s/p LVAD 2 Days Post-Op    Recommendations:     Discharge Recommendations: home with home health  Discharge Equipment Recommendations:  none  Barriers to discharge:  None    History:     Occupational Profile:  Living Environment: lives with spouse 2 SH with 4 SALVADOR; bed/bath and living area upstairs  Previous level of function: (I) with ADL and ambulation  Roles and Routines: retired; drives  Equipment Owned:  cane, straight (used as needed for gout flare-ups)  Assistance upon Discharge: wife can assist    Past Medical History:   Diagnosis Date    CHF (congestive heart failure)     Dilated cardiomyopathy 1/10/2018    Disorder of kidney and ureter     CKD    Gout     HTN (hypertension)     Ventricular tachycardia (paroxysmal)        Past Surgical History:   Procedure Laterality Date    CARDIAC CATHETERIZATION  Dec. 2012    CARDIAC DEFIBRILLATOR PLACEMENT Left     CRRT-D    COLONOSCOPY N/A 3/6/2018    Procedure: COLONOSCOPY;  Surgeon: Alonso Bone MD;  Location: [facility] ([address] [place] [address]);  Service: Endoscopy;  Laterality: N/A;       Subjective     Chief Complaint: dizziness; "I feel like I'm going to pass out."  Patient/Family stated goals: to get better  Communicated with: RN prior to session.  Pain/Comfort:  · Pain Rating 1: 3/10  · Location - Side 1: Bilateral  · Location - Orientation 1: midline  · Location 1: sternal  · Pain Addressed 1: Reposition, Distraction  · Pain Rating Post-Intervention 1: 3/10    Patients cultural, spiritual, Confucianist conflicts given the current situation: none    Objective:     Patient found with: blood pressure cuff, arterial line, pulse ox (continuous), telemetry, LVAD, central line, sun catheter, chest tube ([place] catheter, NO)    General Precautions: Standard, fall, [place] Tito catheter (PA cordis), LVAD, sternal   Orthopedic Precautions:  "   Braces:       Occupational Performance:    Bed Mobility:    · NT    Functional Mobility/Transfers:  · Patient completed Sit <> Stand Transfer with minimum assistance  with  no assistive device   · Functional Mobility: minimal A x 3 steps forward/backwards x 2 trials    Activities of Daily Living:  · Feeding:  contact guard assistance    · Grooming: moderate assistance    · UB Dressing: maximal assistance     · LB Dressing: total assistance    · Toileting: total assistance      Cognitive/Visual Perceptual:  Oriented to: Person, Place, Time and Situation  Follows Commands/attention: Follows multistep  commands  Communication: clear/fluent  Memory:  No Deficits noted  Safety awareness/insight to disability: intact  Coping skills/emotional control: Appropriate to situation    Physical Exam:  Postural examination/scapula alignment:    -       No postural abnormalities identified  Sensation:    -       Intact  Upper Extremity Range of Motion:     -       Right Upper Extremity: WFL elbow and below; AAROM WFL proximally  -       Left Upper Extremity: WFL elbow and below; AAROM WFL proximally  Upper Extremity Strength:    -       Right Upper Extremity: WFL  -       Left Upper Extremity: WFL   Strength:    -       Right Upper Extremity: WFL  -       Left Upper Extremity: WFL  Fine Motor Coordination:    -       Intact  Gross motor coordination:   WFL      Patient left up in chair with all lines intact, call button in reach, rn notified and spouse present    AMPA 6 Click:  [facility] Total Score: 10    Treatment & Education:  Pt ed on OT POC  Pt ed on VAD management including LVAD sternal precautions; proper anchor placement and driveline securement for DLES integrity; reason for and performance of Self Test;   Education:    Assessment:     Tim Richards is a 52 y.o. male with a medical diagnosis of Acute decompensated heart failure.  He presents s/p LVAD.  Performance deficits affecting function are impaired balance,  "weakness, impaired self care skills, impaired endurance, impaired functional mobilty, gait instability, decreased upper extremity function, pain, impaired cardiopulmonary response to activity.      Rehab Prognosis:  Good; patient would benefit from acute skilled OT services to address these deficits and reach maximum level of function.         Clinical Decision Makin.  OT Mod:  "Pt evaluation falls under moderate complexity for evaluation coding due to identification of 3-5 performance deficits noted as stated above. Eval required Min/Mod assistance to complete on this date and detailed assessment(s) were utilized. Moreover, an expanded review of history and occupational profile obtained with additional review of cognitive, physical and psychosocial hx."     Plan:     Patient to be seen 6 x/week to address the above listed problems via self-care/home management, therapeutic activities, therapeutic exercises  · Plan of Care Expires: 18  · Plan of Care Reviewed with: patient, spouse    This Plan of care has been discussed with the patient who was involved in its development and understands and is in agreement with the identified goals and treatment plan    GOALS:    Occupational Therapy Goals        Problem: Occupational Therapy Goal    Goal Priority Disciplines Outcome Interventions   Occupational Therapy Goal     OT, PT/OT Ongoing (interventions implemented as appropriate)    Description:  Goals to be met by: 3/26/18     Patient will increase functional independence with ADLs by performing:    Feeding with [place].  UE Dressing with Supervision.  LE Dressing with Supervision.  Grooming while standing at sink with Supervision.  Toileting from toilet with Supervision for hygiene and clothing management.   Toilet transfer to toilet with Supervision.  Pt will be (I) with VAD management during ADL.                      Time Tracking:     OT Date of Treatment: 18  OT Start Time: 954  OT Stop " Time: 1017  OT Total Time (min): 23 min    Billable Minutes:Evaluation 10  Therapeutic Activity 13    CLIFF Garcia  3/12/2018

## 2018-03-12 NOTE — PROCEDURES
Patient sitting in chair with family at [facility]. VAD interrogation completed this AM in the event changes needed to be made. Will continue to monitor for further issues.     Pulsatile: Yes   VAD Sounds: Smooth  Problems / Issues / Alarms with VAD if any: PI event      VAD Interrogation:  TXP LVAD INTERROGATIONS 3/12/2018 3/12/2018 3/12/2018 3/12/2018 3/12/2018 3/12/2018 3/12/2018   Type HeartMate II HeartMate II HeartMate II HeartMate II HeartMate II HeartMate II HeartMate II   Flow 5.5 5.6 5.0 5.4 5.6 5.7 5.9   Speed 9000 9000 9000 9000 9000 9000 9000   PI 5.0 5.7 6.5 6.3 5.5 5.9 5.9   Power ([place]) 5.7 5.5 5.3 5.3 5.6 5.6 5.1   LSL 8600 8600 8600 8600 8600 8600 8600   Pulsatility Pulse Pulse Pulse Pulse Pulse Pulse Pulse   }

## 2018-03-12 NOTE — PT/OT/SLP PROGRESS
Speech Language Pathology Treatment    Patient Name:  Tim Richards   MRN:  [id number]  Admitting Diagnosis: Acute decompensated heart failure    Recommendations:                 General Recommendations:  Dysphagia therapy  Diet recommendations:  Dental Soft, Thin   Aspiration Precautions: 1 bite/sip at a time, Eliminate distractions, HOB to 90 degrees, Meds whole 1 at a time, Small bites/sips and Standard aspiration precautions   General Precautions: Standard, fall, aspiration  Communication strategies:  none    Subjective     Pt awake and upright in chair upon arrival     Pain/Comfort:  · Pain Rating 1: 0/10  · Pain Rating Post-Intervention 1: 0/10    Objective:     Has the patient been evaluated by SLP for swallowing?   Yes  Keep patient NPO? No   Current Respiratory Status:        Pt more lethargic this date and with mildly delayed responses compared to initial assessment.  Pt diet not yet advanced by medical team and remains on clears.  SLP observed Pt consume thin liquids  Via single and consecutive sips from a straw. Pt remained with strong clear vocal quality post trials. SLP observed Pt consume meds whole one at a time without difficulty. Speech to continue to follow to monitor Pt progress with complete meals.     Assessment:     Tim Richards is a 52 y.o. male with an SLP diagnosis of Dysphagia gradually improving.     Goals:    SLP Goals        Problem: SLP Goal    Goal Priority Disciplines Outcome   SLP Goal     SLP    Description:  Speech Language Pathology Goals  Goals expected to be met by 3/18    1. Pt will tolerate diet of thin liquids and soft solids without overt clinical signs of aspiration   2. Pt will tolerate trials of regular solids within speech therapy sessions to help determine least restrictive diet                       Plan:     · Patient to be seen:  3 x/week   · Plan of Care expires:  04/09/18  · SLP Follow-Up:  Yes       Discharge recommendations:  home   Barriers to  Discharge:  None per ST discipline     Time Tracking:     SLP Treatment Date:   03/12/18  Speech Start Time:  1030  Speech Stop Time:  1042     Speech Total Time (min):  12 min    Billable Minutes: Treatment Swallowing Dysfunction 12    Ayesha Vora CCC-SLP  03/12/2018

## 2018-03-12 NOTE — PROGRESS NOTES
"[facility]  Endocrinology  Progress Note    Admit Date: 3/1/2018     Reason for Consult: Management of Hyperglycemia     Surgical Procedure and Date: 3/8/18 - LVAD placement; 3/9/18 s/p exp lap  Lab Results   Component Value Date    HGBA1C 5.5 03/08/2018       HPI: Patient is a 52 y.o. male with a diagnosis of CHF since 2011 (ICD placement with multiple interrogations) admitted in January for advanced heart failure options. Admitted with decompensated acute heart failure, elevated creatinine. Underwent LVAD 3/8/18.    Endocrinology has been consulted to assist in post op hyperglycemia.        Interval HPI:   Prandial excursion at lunch time, bg 202.  OOB, low grade temp.  No hypoglycemia.  FBG at goal.  BP (!) 86/0 (BP Location: Left arm, Patient Position: Sitting)   Pulse 85   Temp 99.9 °F (37.7 °C) (Core ([place]))   Resp 20   Ht 6' 1" (1.854 m)   Wt 119.7 kg (263 lb 14.3 oz)   SpO2 100%   BMI 34.82 kg/m²       Labs Reviewed and Include      Recent Labs  Lab 03/12/18  0506 03/12/18  1253   *  --    CALCIUM 9.4  --    ALBUMIN 2.7*  --    PROT 6.3  --      --    K 3.9  3.9 3.7   CO2 27  --      --    BUN 42*  --    CREATININE 2.2*  --    ALKPHOS 79  --    ALT 21  --    AST 55*  --    BILITOT 3.3*  --      Lab Results   Component Value Date    WBC 13.55 (H) 03/12/2018    HGB 6.7 (L) 03/12/2018    HCT 20.5 (L) 03/12/2018    MCV 82 03/12/2018     (L) 03/12/2018     No results for input(s): TSH, FREET4 in the last 168 hours.  Lab Results   Component Value Date    HGBA1C 5.5 03/08/2018       Nutritional status:   Body mass index is 34.82 kg/m².  Lab Results   Component Value Date    ALBUMIN 2.7 (L) 03/12/2018    ALBUMIN 3.0 (L) 03/11/2018    ALBUMIN 3.1 (L) 03/10/2018     Lab Results   Component Value Date    PREALBUMIN 14 (L) 03/08/2018    PREALBUMIN 29 01/23/2018       Estimated Creatinine Clearance: 53.8 mL/min (A) (based on SCr of 2.2 mg/dL " (H)).    Accu-Checks  Recent Labs      03/10/18   2025  03/11/18   0018  03/11/18   0425  03/11/18   0739  03/11/18   0913  03/11/18   1119  03/11/18   1638  03/11/18   2251  03/12/18   0820  03/12/18   1257   POCTGLUCOSE  130*  128*  144*  142*  132*  124*  97  132*  121*  202*       Current Medications and/or Treatments Impacting Glycemic Control  Immunotherapy:  Immunosuppressants     None        Steroids:   Hormones     None        Pressors:    Autonomic Drugs     Start     Stop Route Frequency Ordered    03/12/18 0815  EPINEPHrine (ADRENALIN) 4 mg in sodium chloride 0.9% 250 mL infusion     Question Answer Comment   Titrate by: (in mcg/kg/min) 0.01    Titrate interval: (in minutes) 5    Titrate to maintain: (SBP or MAP or Cardiac Index) MAP    Greater than: (in mmHg) 60    Maximum dose: (in mcg/kg/min) 2        -- IV Continuous 03/12/18 0807        Hyperglycemia/Diabetes Medications: Antihyperglycemics     Start     Stop Route Frequency Ordered    03/12/18 1302  insulin aspart U-100 pen 0-5 Units      -- SubQ Before meals & nightly PRN 03/12/18 1302          ASSESSMENT and PLAN    Hyperglycemia    BG goal 140-180 while inpatient    BG monitoring ac/hs with low correction scale  Patient does not have history of DM.  Will continue to monitor for one more day.  Diet advanced.          LVAD (left ventricular assist device) present    Per HTS; avoid hypoglycemia        CKD (chronic kidney disease), stage III    Estimated Creatinine Clearance: 53.8 mL/min (A) (based on SCr of 2.2 mg/dL (H)).  avoid hypoglycemia  Caution with insulin stacking        Acute on chronic combined systolic and diastolic heart failure    S/p LVAD this admission  Per HTS; avoid hypoglycemia              Roberta Loyola, APRN, FNP  Endocrinology  [facility]

## 2018-03-12 NOTE — ASSESSMENT & PLAN NOTE
BG goal 140-180 while inpatient    BG monitoring ac/hs with low correction scale  Patient does not have history of DM.  Will continue to monitor for one more day.  Diet advanced.

## 2018-03-12 NOTE — SUBJECTIVE & OBJECTIVE
Subjective:     Post-Op Info:  Procedure(s) (LRB):  CLOSURE-STERNAL WOUND (N/A)  PLACEMENT-NEOPERICARDIUM   2 Days Post-Op        Interval History:     NAEON. Interval enlargement of right apical pneumo. Extubated yesterday. Creatinine improving.    Medications:  Continuous Infusions:   DOBUTamine 5 mcg/kg/min (03/12/18 1000)    epinephrine infusion 0.05 mcg/kg/min (03/12/18 1000)    furosemide (LASIX) 1 mg/mL infusion (non-titrating) 5 mg/hr (03/12/18 1000)    heparin (porcine) in 5 % dex 1,100 Units/hr (03/12/18 1000)    nitric oxide gas       Scheduled Meds:   amiodarone  400 mg Oral BID    docusate sodium  200 mg Oral QHS    ferrous gluconate  324 mg Oral Daily with breakfast    INV aspirin/placebo  81 mg Oral Daily    mupirocin   Nasal BID    pantoprazole  40 mg Oral Daily    polyethylene glycol  17 g Oral Daily    sodium chloride 0.9%  3 mL Intravenous Q8H    warfarin  2 mg Oral Once     PRN Meds:acetaminophen, albumin human 5%, albuterol sulfate, bisacodyl, dextrose 50%, dextrose 50%, fentaNYL, magnesium hydroxide 400 mg/5 ml, oxyCODONE, oxyCODONE     Objective:     Vital Signs (Most Recent):  Temp: 100 °F (37.8 °C) (03/12/18 1015)  Pulse: 98 (03/12/18 1015)  Resp: (!) 31 (03/12/18 1015)  BP: (!) 90/0 (03/12/18 0300)  SpO2: 97 % (03/12/18 1015) Vital Signs (24h Range):  Temp:  [99.8 °F (37.7 °C)-100.8 °F (38.2 °C)] 100 °F (37.8 °C)  Pulse:  [85-98] 98  Resp:  [3-32] 31  SpO2:  [94 %-100 %] 97 %  BP: (82-90)/(0-54) 90/0  Arterial Line BP: (75-93)/(53-81) 87/81       Intake/Output Summary (Last 24 hours) at 03/12/18 1036  Last data filed at 03/12/18 1000   Gross per 24 hour   Intake          3525.55 ml   Output             4655 ml   Net         -1129.45 ml       Physical Exam   Constitutional: He is oriented to person, place, and time. He appears well-developed and well-nourished. No distress.   Cardiovascular: Normal rate and regular rhythm.    Mechanical hum   Pulmonary/Chest: Effort normal. No  respiratory distress.   Chest tube with serosang output   Abdominal: Soft. He exhibits no distension.   Neurological: He is alert and oriented to person, place, and time.   Skin: Skin is warm and dry.   Psychiatric: He has a normal mood and affect. His behavior is normal.       Significant Labs:  BMP:   Recent Labs  Lab 03/12/18  0506 03/12/18  0809   *  --      --    K 3.9  3.9  --      --    CO2 27  --    BUN 42*  --    CREATININE 2.2*  --    CALCIUM 9.4  --    MG 2.3 2.0     CBC:   Recent Labs  Lab 03/12/18  0506   WBC 13.55*   RBC 2.49*   HGB 6.7*   HCT 20.5*   *   MCV 82   MCH 26.9*   MCHC 32.7     Coagulation:   Recent Labs  Lab 03/12/18  0506   INR 1.0   APTT 25.0       Significant Diagnostics:  CXR: I have reviewed all pertinent results/findings within the past 24 hours    Procedure: Device Interrogation Including analysis of device parameters  Current Settings: Ventricular Assist Device  Review of device function is stable  TXP LVAD INTERROGATIONS 3/12/2018 3/12/2018 3/12/2018 3/12/2018 3/12/2018 3/12/2018 3/12/2018   Type HeartMate II HeartMate II HeartMate II HeartMate II HeartMate II HeartMate II HeartMate II   Flow 5.5 5.6 5.0 5.4 5.6 5.7 5.9   Speed 9000 9000 9000 9000 9000 9000 9000   PI 5.0 5.7 6.5 6.3 5.5 5.9 5.9   Power ([place]) 5.7 5.5 5.3 5.3 5.6 5.6 5.1   LSL 8600 8600 8600 8600 8600 8600 8600   Pulsatility Pulse Pulse Pulse Pulse Pulse Pulse Pulse

## 2018-03-12 NOTE — PROGRESS NOTES
POC reviewed with pt and family at 1400. Pt's pain controlled with Oxycodone 10 mg q4 hr. Pt on 5L NC with 100% O2 sat.  Nitric around 10. Continuous fluids infusing lasix @ 5 mg/hr, amiodarone @ 0.5mg/min, Dobutamine @ 5, Epi @ 0.06. Heparin @ 1100 u/hr.  UO increased requiring 60 mEq of K+.  Spouse and pt. verbalized understanding. Questions and concerns addressed. No acute events today. Pt progressing toward goals. Will continue to monitor. See flowsheets for full assessment and VS info.

## 2018-03-12 NOTE — PHYSICIAN QUERY
"PT Name: Tim Richards  MR #: [id number]    Physician Query Form - Hematology Clarification      CDS/: Ariana Jefferson RN, CCDS             Contact information: [email or web address]    This form is a permanent document in the medical record.      Query Date: March 12, 2018    By submitting this query, we are merely seeking further clarification of documentation. Please utilize your independent clinical judgment when addressing the question(s) below.    The Medical record contains the following:   Indicators  Supporting Clinical Findings Location in Medical Record   X "Anemia" documented anemia 3/12 prog note   X H & H = 12.2/37.9->13.2/40->11/33.7->8.6/25.5-->  7.3/22.6->6.7/20.5 3/1, 3/5, 3/8, 3/10, 3/11, 3/12 lab    BP =                     HR=      "GI bleeding" documented      Acute bleeding (Non GI site)     X Transfusion(s) getting a unit PRBC's today pre primary 3/12 prog note    Treatment:     X Other:  Implantation of intracorporeal left ventricular assist device,    ebl 100 ml    1.  Washout of the mediastinum.  2.  Re-revision of the outflow graft of the HeartMate II.  3.  Temporary closure of the chest.   ml    Diffuse coagulopathy. 3/8 op note      3/9 op note     Provider, please specify diagnosis or diagnoses associated with above clinical findings.    [x  ] Acute blood loss anemia expected post-operatively  [  ] Acute blood loss anemia  [  ] Other Hematological Diagnosis (please specify): _________________________________    [  ] Clinically Undetermined       Please document in your progress notes daily for the duration of treatment, until resolved, and include in your discharge summary.                                                                                                      "

## 2018-03-12 NOTE — ASSESSMENT & PLAN NOTE
-HeartMate II Implanted 3/8/18 as DT with repositioning of outflow graft 3/9/18.  S/p chest closure 3/10/18  -CTS Primary  -Implanted by Dr. Kaufman  -Anticoagulation per Primary Coumadin  -Antiplatelets; Patient is enrolled in PREVENT 2  -Speed set at 9000, LSL 8600 rpm  -Interrogation notable for no events  -Not listed for OHTx  - On Epi,  and NO  -Amio drip for A Fib per CTS

## 2018-03-12 NOTE — PHYSICIAN QUERY
PT Name: Tim Richards  MR #: [id number]  Physician Query Form - Respiratory Condition Clarification      CDS/: Ariana Jefferson RN, CCDS             Contact information: [email or web address]  This form is a permanent document in the medical record.     Query Date: March 12, 2018    By submitting this query, we are merely seeking further clarification of documentation. Please utilize your independent clinical judgment when addressing the question(s) below.    The medical record contains the following:  Indicators Supporting Clinical Findings Location in Medical Record    Transfer from outside facility:      X Surgery or Procedure performed: Implantation of intracorporeal left ventricular assist device    1.  Washout of the mediastinum.  2.  Re-revision of the outflow graft of the HeartMate II.  3.  Temporary closure of the chest. 3/8 op note    3/9 op note       X Pneumothorax documented R side PTX first noted after extubation 3/11, increasing in size, plan for chest tube 3/12    Interval enlargement of right apical pneumo.   Pneumothorax- planning CT today 3/12 cc note        3/12 cts prog note    3/12 hts note   X Chest Tube placement Will proceed with CT-guided placement of a right pigtail chest tube for an apical pneumothorax.     Chest tube not placed due IR seeing improved PTX.  CXR today appears improved in size compared to yesterday. 3/12 IR note        3/13 cc note            Sudden onset of dyspnea      Complication or Complicated by documented      Difficult intubation documented     X Radiology findings: Status post LVAD. Continued left lung base consolidation and pleural effusion. Small pneumothorax on the right. 3/11 cxr    Treatments:      Other:       Please clarify if the ___Pneumothorax____ is                 [   ]   Present on Admission               [   ]   Iatrogenic or Complication of procedure during this admission               [ X  ]   Inherent to the procedure during this  admission               [   ]   Routinely expected                [   ]   Other: ___________________               [   ]   Clinically insignificant                                          Please document in your progress notes daily for the duration of treatment, until resolved, and include in your discharge summary.

## 2018-03-12 NOTE — PT/OT/SLP EVAL
Physical Therapy Evaluation    Patient Name:  Tim Richards   MRN:  [id number]    Recommendations:     Discharge Recommendations:  home   Discharge Equipment Recommendations: none   Barriers to discharge: None    Assessment:     Tim Richards is a 52 y.o. male admitted with a medical diagnosis of Acute decompensated heart failure.  He presents with the following impairments/functional limitations:  impaired endurance, impaired self care skills, impaired balance, gait instability, impaired functional mobilty .    Rehab Prognosis:  good; patient would benefit from acute skilled PT services to address these deficits and reach maximum level of function.      Recent Surgery: Procedure(s) (LRB):  CLOSURE-STERNAL WOUND (N/A)  PLACEMENT-NEOPERICARDIUM 2 Days Post-Op    Plan:     During this hospitalization, patient to be seen 6 x/week to address the above listed problems via gait training, therapeutic activities, therapeutic exercises  · Plan of Care Expires:      Plan of Care Reviewed with: patient    Subjective     Communicated with RN prior to session.  Patient found seated upon PT entry to room, agreeable to evaluation.      Chief Complaint: Pt agreeable to mobility.  C/o dizziness after ambulation.  Pain/Comfort:  · Pain Rating 1: 0/10    Patients cultural, spiritual, Scientologist conflicts given the current situation:      Living Environment:  Lives with wife in home with 5 SALVADOR.  Prior to admission, patients level of function was independent.  Patient has the following equipment: none.  DME owned (not currently used): none.  Upon discharge, patient will have assistance from wife.    Objective:     Patient found with:  (seated with [place], LVAD, tele, pulse ox, Bp cuff, Lagunas, CT intact)     General Precautions: Standard,  (fall, sternal, LVAD)   Orthopedic Precautions:N/A   Braces: N/A     Functional Mobility:  · Sit>stand with min assist and HHA  · Pt ambulated 5 steps forward/back x 2 trials with HHA and min  assist.  Distance limited due to [place].  Pt c/o dizziness and returned to sitting.  Legs elevated and pt's complaints subsided.    AM-PAC 6 CLICK MOBILITY  Total Score:15   Began LVAD education (driveline anchor location, self-test, etc).    Patient left up in chair with all lines intact and call button in reach.    GOALS:    Physical Therapy Goals        Problem: Physical Therapy Goal    Goal Priority Disciplines Outcome Goal Variances Interventions   Physical Therapy Goal     PT/OT, PT Ongoing (interventions implemented as appropriate)     Description:  Goals to be met by: 3/26     Patient will increase functional independence with mobility by performin. Supine to sit with Stand-by Assistance  2. Sit to supine with Stand-by Assistance  3. Sit to stand transfer with Stand-by Assistance  4. Gait  x 100 feet with Stand-by Assistance                     History:     Past Medical History:   Diagnosis Date    CHF (congestive heart failure)     Dilated cardiomyopathy 1/10/2018    Disorder of kidney and ureter     CKD    Gout     HTN (hypertension)     Ventricular tachycardia (paroxysmal)        Past Surgical History:   Procedure Laterality Date    CARDIAC CATHETERIZATION  Dec. 2012    CARDIAC DEFIBRILLATOR PLACEMENT Left     CRRT-D    COLONOSCOPY N/A 3/6/2018    Procedure: COLONOSCOPY;  Surgeon: Alonso Bone MD;  Location: [facility] ([address] [place] [address]);  Service: Endoscopy;  Laterality: N/A;       Clinical Decision Making:     History  Co-morbidities and personal factors that may impact the plan of care Examination  Body Structures and Functions, activity limitations and participation restrictions that may impact the plan of care Clinical Presentation   Decision Making/ Complexity Score   Co-morbidities:   [] Time since onset of injury / illness / exacerbation  [] Status of current condition  []Patient's cognitive status and safety concerns    [] Multiple Medical Problems (see med hx)  Personal Factors:   []  Patient's age  [] Prior Level of function   [] Patient's home situation (environment and family support)  [] Patient's level of motivation  [] Expected progression of patient      HISTORY:(criteria)    [] 49663 - no personal factors/history    [] 56217 - has 1-2 personal factor/comorbidity     [] 07838 - has >3 personal factor/comorbidity     Body Regions:  [] Objective examination findings  [] Head     []  Neck  [] Trunk   [] Upper Extremity  [] Lower Extremity    Body Systems:  [] For communication ability, affect, cognition, language, and learning style: the assessment of the ability to make needs known, consciousness, orientation (person, place, and time), expected emotional /behavioral responses, and learning preferences (eg, learning barriers, education  needs)  [] For the neuromuscular system: a general assessment of gross coordinated movement (eg, balance, gait, locomotion, transfers, and transitions) and motor function  (motor control and motor learning)  [] For the musculoskeletal system: the assessment of gross symmetry, gross range of motion, gross strength, height, and weight  [] For the integumentary system: the assessment of pliability(texture), presence of scar formation, skin color, and skin integrity  [] For cardiovascular/pulmonary system: the assessment of heart rate, respiratory rate, blood pressure, and edema     Activity limitations:    [] Patient's cognitive status and saf ety concerns          [] Status of current condition      [] Weight bearing restriction  [] Cardiopulmunary Restriction    Participation Restrictions:   [] Goals and goal agreement with the patient     [] Rehab potential (prognosis) and probable outcome      Examination of Body System: (criteria)    [] 38735 - addressing 1-2 elements    [] 92650 - addressing a total of 3 or more elements     [] 64142 -  Addressing a total of 4 or more elements         Clinical Presentation: (criteria)  Choose one     On examination of body  system using standardized tests and measures patient presents with (CHOOSE ONE) elements from any of the following: body structures and functions, activity limitations, and/or participation restrictions.  Leading to a clinical presentation that is considered (CHOOSE ONE)                              Clinical Decision Making  (Eval Complexity):  Choose One     Time Tracking:     PT Received On: 03/12/18  PT Start Time: 0915     PT Stop Time: 0935  PT Total Time (min): 20 min     Billable Minutes: Evaluation 15      Teresa Barton, PT  03/12/2018

## 2018-03-12 NOTE — PROGRESS NOTES
[facility]  Heart Transplant  Progress Note    Patient Name: Tim Richards  MRN: [id number]  Admission Date: 3/1/2018  Hospital Length of Stay: 11 days  Attending Physician: Yg Kaufman MD  Primary Care Provider: Ja Méndez MD  Principal Problem:Acute decompensated heart failure    Subjective:     Interval History: Patient sitting in chair with family at bedside.  He reports feeling well today.  Has some chest soreness.  CXR with pneumo; planning for chest tube placement.  Has orders for 1 unit of blood today.  He is not sure when his last bowel movement was.  He is anticipating his diet being advanced to soft foods.    Continuous Infusions:   DOBUTamine 5 mcg/kg/min (03/12/18 0900)    epinephrine infusion 0.05 mcg/kg/min (03/12/18 0900)    furosemide (LASIX) 1 mg/mL infusion (non-titrating) 5 mg/hr (03/12/18 0900)    heparin (porcine) in 5 % dex 1,100 Units/hr (03/12/18 0900)    nitric oxide gas       Scheduled Meds:   amiodarone  400 mg Oral BID    docusate sodium  200 mg Oral QHS    ferrous gluconate  324 mg Oral Daily with breakfast    INV aspirin/placebo  81 mg Oral Daily    mupirocin   Nasal BID    pantoprazole  40 mg Oral Daily    polyethylene glycol  17 g Oral Daily    sodium chloride 0.9%  3 mL Intravenous Q8H    warfarin  2 mg Oral Once     PRN Meds:acetaminophen, albumin human 5%, albuterol sulfate, bisacodyl, dextrose 50%, dextrose 50%, fentaNYL, magnesium hydroxide 400 mg/5 ml, oxyCODONE, oxyCODONE    Review of patient's allergies indicates:   Allergen Reactions    Lisinopril Anaphylaxis     Objective:     Vital Signs (Most Recent):  Temp: 99.8 °F (37.7 °C) (03/12/18 0700)  Pulse: 86 (03/12/18 0945)  Resp: (!) 22 (03/12/18 0945)  BP: (!) 90/0 (03/12/18 0300)  SpO2: 100 % (03/12/18 0945) Vital Signs (24h Range):  Temp:  [99.8 °F (37.7 °C)-100.8 °F (38.2 °C)] 99.8 °F (37.7 °C)  Pulse:  [85-99] 86  Resp:  [3-32] 22  SpO2:  [94 %-100 %] 100 %  BP:  (82-90)/(0-54) 90/0  Arterial Line BP: (75-93)/(53-72) 80/60     Patient Vitals for the past 72 hrs (Last 3 readings):   Weight   03/11/18 0500 119.7 kg (263 lb 14.3 oz)   03/10/18 0318 119 kg (262 lb 5.6 oz)     Body mass index is 34.82 kg/m².      Intake/Output Summary (Last 24 hours) at 03/12/18 0958  Last data filed at 03/12/18 0900   Gross per 24 hour   Intake          3499.04 ml   Output             4655 ml   Net         -1155.96 ml     Hemodynamic Parameters:    Physical Exam   Constitutional: He appears well-developed and well-nourished. No distress.   HENT:   Head: Normocephalic and atraumatic.   Neck: Normal range of motion. No JVD present.   Cardiovascular: Normal rate.  Exam reveals no friction rub.    No murmur heard.  Normal VAD hum   Pulmonary/Chest: Effort normal. No respiratory distress. He has no wheezes. He has rales.   Faint left lower lobe   Abdominal: Soft. He exhibits no distension.   Musculoskeletal: Normal range of motion. He exhibits no edema.   Neurological: He is alert.   Skin: Skin is warm. Capillary refill takes 2 to 3 seconds. He is not diaphoretic. No erythema.     Significant Labs:  CBC:    Recent Labs  Lab 03/11/18  0422 03/11/18  0733 03/12/18  0506   WBC 14.82*  14.82* 14.09* 13.55*   RBC 2.72*  2.72* 2.76* 2.49*   HGB 7.7*  7.7* 7.3* 6.7*   HCT 22.5*  22.5* 22.6* 20.5*   *  133* 127* 140*   MCV 83  83 82 82   MCH 28.3  28.3 26.4* 26.9*   MCHC 34.2  34.2 32.3 32.7     BNP:    Recent Labs  Lab 03/07/18  0258 03/09/18  0308 03/12/18  0506   BNP 1,110* 1,114* 1,419*     CMP:    Recent Labs  Lab 03/10/18  0400  03/11/18  0422 03/11/18  0733  03/11/18  1720 03/12/18  0001 03/12/18  0506   *  < > 145*  145*  145* 137*  --   --   --  131*   CALCIUM 9.3  < > 9.5  9.5  9.5 9.4  --   --   --  9.4   ALBUMIN 3.1*  --  3.0*  --   --   --   --  2.7*   PROT 6.1  --  6.4  --   --   --   --  6.3     < > 138  138  138 138  --   --   --  139   K 3.8  < > 3.6  3.6   3.6 3.4*  < > 3.6 3.4* 3.9  3.9   CO2 27  < > 27  27  27 26  --   --   --  27   CL 98  < > 99  99  99 99  --   --   --  100   BUN 25*  < > 37*  37*  37* 39*  --   --   --  42*   CREATININE 2.2*  < > 2.6*  2.6*  2.6* 2.7*  --   --   --  2.2*   ALKPHOS 60  --  63  --   --   --   --  79   ALT 21  --  16  --   --   --   --  21   AST 56*  --  44*  --   --   --   --  55*   BILITOT 2.0*  --  2.4*  --   --   --   --  3.3*   < > = values in this interval not displayed.   Coagulation:     Recent Labs  Lab 03/11/18  0422 03/11/18  0733  03/11/18  1720 03/12/18  0001 03/12/18  0506   INR 1.0  1.0 0.9  --   --   --  1.0   APTT 25.5 27.1  < > 23.9 25.6 25.0   < > = values in this interval not displayed.  LDH:    Recent Labs  Lab 03/11/18  0422 03/12/18  0506   * 373*     Microbiology:  Microbiology Results (last 7 days)     Procedure Component Value Units Date/Time    Culture, Respiratory with Gram Stain [[id number]] Collected:  03/11/18 0800    Order Status:  Completed Specimen:  Respiratory from Endotracheal Aspirate Updated:  03/12/18 0931     Respiratory Culture No Growth     Gram Stain (Respiratory) <10 epithelial cells per low power field.     Gram Stain (Respiratory) Few WBC's     Gram Stain (Respiratory) Rare Gram positive cocci     Gram Stain (Respiratory) Rare Gram positive rods    Blood culture [[id number]] Collected:  03/06/18 1107    Order Status:  Completed Specimen:  Blood Updated:  03/11/18 1612     Blood Culture, Routine No growth after 5 days.    Blood culture [[id number]] Collected:  03/06/18 1107    Order Status:  Completed Specimen:  Blood Updated:  03/11/18 1612     Blood Culture, Routine No growth after 5 days.    Blood culture [[id number]] Collected:  03/11/18 0650    Order Status:  Completed Specimen:  Blood from Peripheral, Hand, Right Updated:  03/11/18 1515     Blood Culture, Routine No Growth to date    Blood culture [[id number]] Collected:  03/11/18 0651    Order Status:  Completed  Specimen:  Blood from Peripheral, Wrist, Right Updated:  03/11/18 1515     Blood Culture, Routine No Growth to date    IV catheter culture [[id number]] Collected:  03/07/18 1151    Order Status:  Completed Specimen:  Catheter Tip from Catheter Tip, Intrajugular Updated:  03/10/18 0659     Aerobic Culture - Cath tip No growth    Blood culture [[id number]]     Order Status:  Canceled Specimen:  Blood     Blood culture [[id number]]     Order Status:  Canceled Specimen:  Blood           I have reviewed all pertinent labs within the past 24 hours.    Estimated Creatinine Clearance: 53.8 mL/min (A) (based on SCr of 2.2 mg/dL (H)).    Diagnostic Results:  I have reviewed and interpreted all pertinent imaging results/findings within the past 24 hours.    Assessment and Plan:     Mr. Richards is a 52 y.o. year old Black or  male who has presents as f/u from hospitalization for ADHF after presenting to clinic 1/10/17 as initial consult. advanced surgical options (LVAD/OHT).    This 51 yo BM has had CHF since 2011 at which time an angiogram did not demonstrate any CAD.  He is on amiodarone for VT. He was admitted from 1/12-1/17/18 (see d/c summary) where he was treated for ADHF and initiation of w/u for advanced options. RHC during that admission showed RA 17 and PCWP 35 as well as severely reduced CI 1.6. Though not optimized, he was discharged per his request so as not to lose his job/insurance--see Dr. Hadley's attestation for full details on d/c summary. Since d/c, his Scr has risen from 2.0 on discharge to 2.8 (1/23/18). His BNP had declined to 1040.  He presents today for scheduled hospital f/u.      Today, he reports feeling well. He says he has more energy than usual. His only complaints are cramping and xerosis. He denies n/v/d, no CP, palpitations or syncope. He has stable orthostatic dizziness/LH. No PND or orthopnea. His COLEMAN is improved from discharge and his weight is down about 5-7 pounds on his home  scale. Of note, his Scr on labs from 2 days ago showed Scr 2.8 up from 2.0. T. Bili was down from 1.1 to 0.6 and BNP was down to 1040 from 1700.  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 No labs initially ordered but I ordered and sent him down.     LVAD (left ventricular assist device) present    -HeartMate II Implanted 3/8/18 as DT with repositioning of outflow graft 3/9/18.  S/p chest closure 3/10/18  -CTS Primary  -Implanted by Dr. Kaufman  -Anticoagulation per Primary Coumadin  -Antiplatelets; Patient is enrolled in PREVENT 2  -Speed set at 9000, LSL 8600 rpm  -Interrogation notable for no events  -Not listed for OHTx  - On Epi,  and NO  -Amio drip for A Fib per CTS          Acute on chronic combined systolic and diastolic heart failure    -NICM   -EF: <10%; LVEDD: 9.7 cm  -now s/p HM II LVAD  -diuresing with Lasix drip              CKD (chronic kidney disease), stage III    - cr 2.2 today  -Baseline creat 1.6-2.0.        PVT (paroxysmal ventricular tachycardia)    -has ICD  -On IV amiodarone- transitioning to po today        Hyperbilirubinemia    likely secondary to above  -however, up 3.3 from 2.4 yesterday  -given low grade temperature over the weekend; and has not had bowel movement in over a week; may consider RUQ ultrasound        Biventricular implantable cardioverter-defibrillator in situ    - in place  - ICD interrogated, event noted on 2/25/18 with appropriate shock for VT  -Continue Amiodarone         Anemia    -getting a unit PRBC's today pre primary        Pneumothorax    -Planning chest tube today          Uninterrupted Critical Care/Counseling Time (not including procedures): 60 minutes      YANET Coleman  Heart Transplant  [facility]

## 2018-03-12 NOTE — SUBJECTIVE & OBJECTIVE
Interval History: Patient sitting in chair with family at bedside.  He reports feeling well today.  Has some chest soreness.  CXR with pneumo; planning for chest tube placement.  Has orders for 1 unit of blood today.  He is not sure when his last bowel movement was.  He is anticipating his diet being advanced to soft foods.    Continuous Infusions:   DOBUTamine 5 mcg/kg/min (03/12/18 0900)    epinephrine infusion 0.05 mcg/kg/min (03/12/18 0900)    furosemide (LASIX) 1 mg/mL infusion (non-titrating) 5 mg/hr (03/12/18 0900)    heparin (porcine) in 5 % dex 1,100 Units/hr (03/12/18 0900)    nitric oxide gas       Scheduled Meds:   amiodarone  400 mg Oral BID    docusate sodium  200 mg Oral QHS    ferrous gluconate  324 mg Oral Daily with breakfast    INV aspirin/placebo  81 mg Oral Daily    mupirocin   Nasal BID    pantoprazole  40 mg Oral Daily    polyethylene glycol  17 g Oral Daily    sodium chloride 0.9%  3 mL Intravenous Q8H    warfarin  2 mg Oral Once     PRN Meds:acetaminophen, albumin human 5%, albuterol sulfate, bisacodyl, dextrose 50%, dextrose 50%, fentaNYL, magnesium hydroxide 400 mg/5 ml, oxyCODONE, oxyCODONE    Review of patient's allergies indicates:   Allergen Reactions    Lisinopril Anaphylaxis     Objective:     Vital Signs (Most Recent):  Temp: 99.8 °F (37.7 °C) (03/12/18 0700)  Pulse: 86 (03/12/18 0945)  Resp: (!) 22 (03/12/18 0945)  BP: (!) 90/0 (03/12/18 0300)  SpO2: 100 % (03/12/18 0945) Vital Signs (24h Range):  Temp:  [99.8 °F (37.7 °C)-100.8 °F (38.2 °C)] 99.8 °F (37.7 °C)  Pulse:  [85-99] 86  Resp:  [3-32] 22  SpO2:  [94 %-100 %] 100 %  BP: (82-90)/(0-54) 90/0  Arterial Line BP: (75-93)/(53-72) 80/60     Patient Vitals for the past 72 hrs (Last 3 readings):   Weight   03/11/18 0500 119.7 kg (263 lb 14.3 oz)   03/10/18 0318 119 kg (262 lb 5.6 oz)     Body mass index is 34.82 kg/m².      Intake/Output Summary (Last 24 hours) at 03/12/18 0958  Last data filed at 03/12/18 0900    Gross per 24 hour   Intake          3499.04 ml   Output             4655 ml   Net         -1155.96 ml     Hemodynamic Parameters:    Physical Exam   Constitutional: He appears well-developed and well-nourished. No distress.   HENT:   Head: Normocephalic and atraumatic.   Neck: Normal range of motion. No JVD present.   Cardiovascular: Normal rate.  Exam reveals no friction rub.    No murmur heard.  Normal VAD hum   Pulmonary/Chest: Effort normal. No respiratory distress. He has no wheezes. He has rales.   Faint left lower lobe   Abdominal: Soft. He exhibits no distension.   Musculoskeletal: Normal range of motion. He exhibits no edema.   Neurological: He is alert.   Skin: Skin is warm. Capillary refill takes 2 to 3 seconds. He is not diaphoretic. No erythema.     Significant Labs:  CBC:    Recent Labs  Lab 03/11/18  0422 03/11/18  0733 03/12/18  0506   WBC 14.82*  14.82* 14.09* 13.55*   RBC 2.72*  2.72* 2.76* 2.49*   HGB 7.7*  7.7* 7.3* 6.7*   HCT 22.5*  22.5* 22.6* 20.5*   *  133* 127* 140*   MCV 83  83 82 82   MCH 28.3  28.3 26.4* 26.9*   MCHC 34.2  34.2 32.3 32.7     BNP:    Recent Labs  Lab 03/07/18  0258 03/09/18  0308 03/12/18  0506   BNP 1,110* 1,114* 1,419*     CMP:    Recent Labs  Lab 03/10/18  0400  03/11/18  0422 03/11/18  0733  03/11/18  1720 03/12/18  0001 03/12/18  0506   *  < > 145*  145*  145* 137*  --   --   --  131*   CALCIUM 9.3  < > 9.5  9.5  9.5 9.4  --   --   --  9.4   ALBUMIN 3.1*  --  3.0*  --   --   --   --  2.7*   PROT 6.1  --  6.4  --   --   --   --  6.3     < > 138  138  138 138  --   --   --  139   K 3.8  < > 3.6  3.6  3.6 3.4*  < > 3.6 3.4* 3.9  3.9   CO2 27  < > 27  27  27 26  --   --   --  27   CL 98  < > 99  99  99 99  --   --   --  100   BUN 25*  < > 37*  37*  37* 39*  --   --   --  42*   CREATININE 2.2*  < > 2.6*  2.6*  2.6* 2.7*  --   --   --  2.2*   ALKPHOS 60  --  63  --   --   --   --  79   ALT 21  --  16  --   --   --   --  21   AST  56*  --  44*  --   --   --   --  55*   BILITOT 2.0*  --  2.4*  --   --   --   --  3.3*   < > = values in this interval not displayed.   Coagulation:     Recent Labs  Lab 03/11/18  0422 03/11/18  0733  03/11/18  1720 03/12/18  0001 03/12/18  0506   INR 1.0  1.0 0.9  --   --   --  1.0   APTT 25.5 27.1  < > 23.9 25.6 25.0   < > = values in this interval not displayed.  LDH:    Recent Labs  Lab 03/11/18  0422 03/12/18  0506   * 373*     Microbiology:  Microbiology Results (last 7 days)     Procedure Component Value Units Date/Time    Culture, Respiratory with Gram Stain [[id number]] Collected:  03/11/18 0800    Order Status:  Completed Specimen:  Respiratory from Endotracheal Aspirate Updated:  03/12/18 0931     Respiratory Culture No Growth     Gram Stain (Respiratory) <10 epithelial cells per low power field.     Gram Stain (Respiratory) Few WBC's     Gram Stain (Respiratory) Rare Gram positive cocci     Gram Stain (Respiratory) Rare Gram positive rods    Blood culture [[id number]] Collected:  03/06/18 1107    Order Status:  Completed Specimen:  Blood Updated:  03/11/18 1612     Blood Culture, Routine No growth after 5 days.    Blood culture [[id number]] Collected:  03/06/18 1107    Order Status:  Completed Specimen:  Blood Updated:  03/11/18 1612     Blood Culture, Routine No growth after 5 days.    Blood culture [[id number]] Collected:  03/11/18 0650    Order Status:  Completed Specimen:  Blood from Peripheral, Hand, Right Updated:  03/11/18 1515     Blood Culture, Routine No Growth to date    Blood culture [[id number]] Collected:  03/11/18 0651    Order Status:  Completed Specimen:  Blood from Peripheral, Wrist, Right Updated:  03/11/18 1515     Blood Culture, Routine No Growth to date    IV catheter culture [[id number]] Collected:  03/07/18 1151    Order Status:  Completed Specimen:  Catheter Tip from Catheter Tip, Intrajugular Updated:  03/10/18 0659     Aerobic Culture - Cath tip No growth    Blood culture  [[id number]]     Order Status:  Canceled Specimen:  Blood     Blood culture [[id number]]     Order Status:  Canceled Specimen:  Blood           I have reviewed all pertinent labs within the past 24 hours.    Estimated Creatinine Clearance: 53.8 mL/min (A) (based on SCr of 2.2 mg/dL (H)).    Diagnostic Results:  I have reviewed and interpreted all pertinent imaging results/findings within the past 24 hours.

## 2018-03-12 NOTE — ASSESSMENT & PLAN NOTE
Neuro:   -off sedation  -pain controlled with oxycodone     Pulmonary:   - extubated 3/11  - Scheduled duo-nebs  - Nitric. Wean per CTS  - R side PTX first noted after extubation 3/11, increasing in size, plan for chest tube 3/12     Cardiac:  - s/p LVAD 3/8/18, outflow tract revision on 3/9, and chest closure 3/10  - Pressors: Epi, dobutamine  - Wean pressors per CTS  - Amiodarone drip for arrhythmias     Renal:   - CKD; baseline Cr. 1.7  - WAGNER on 3/8 and 3/11, improving  - Lagunas in place  - Lasix drip  - Monitor UOP     Infectious Disease:   -Leukocytosis on 3/8 after initial surgery, improved, stable at 14, trend WBC  -Low grade fevers on 3/8 after initial surgery, torsten to >38C on 3/10  -Blood cultures 3/6 NG, 3/11 NGTD  -vancomycin and doxycycline on 3/8  -cefazolin for surgery 3/9 and 3/10  -fluconazole, cefepime, bacitracin IM started 3/8, continuing     Hematology/Oncology:  - Trend CBC, H/H slowly trending down, transfuse per CTS  - iron supplmentation  - On heparin drip, started warfarin 3/12     Endocrine:  - Insuline gtt  - Endocrine on board     Fluids/Electrolytes/Nutrition/GI:   - tolerating PO intake, advance as tolerated  - Trend CMP  - Replace Lytes PRN      Dispo:  Continue SICU care. CTS primary.

## 2018-03-12 NOTE — PLAN OF CARE
Problem: SLP Goal  Goal: SLP Goal  Speech Language Pathology Goals  Goals expected to be met by 3/18    1. Pt will tolerate diet of thin liquids and soft solids without overt clinical signs of aspiration   2. Pt will tolerate trials of regular solids within speech therapy sessions to help determine least restrictive diet         Pt with slow progress towards goals     Ayesha Vora MS, CCC-SLP  Speech Language Pathologist  Pager: [phone]  Date 3/12/2018

## 2018-03-12 NOTE — PROGRESS NOTES
UPDATE    Pt presents in room with wife. Pt aaox4, seated in chair. Pt is s/p LVAD this admission. Pt reports coping well and denies further needs, questions, concerns at this time and none indicated. Wife reports coping well and denies needs, questions, concerns at this time. Providing ongoing psychosocial and counseling support, education, resources, assistance and discharge planning as indicated. Following and available.

## 2018-03-12 NOTE — ASSESSMENT & PLAN NOTE
likely secondary to above  -however, up 3.3 from 2.4 yesterday  -given low grade temperature over the weekend; and has not had bowel movement in over a week; may consider RUQ ultrasound

## 2018-03-12 NOTE — PLAN OF CARE
Problem: Patient Care Overview  Goal: Plan of Care Review  Outcome: Ongoing (interventions implemented as appropriate)  Dx: POD 4 s/p LVAD placement (HM2)    Shift Events: 1 unit PRBC given, NO weaned off, Amio gtt D/C    Neuro: AAO x4, follows commands, moves all extremities    Vital Signs: MAP goal 60-80. CVP 18 while flat. Pt on 3L NC, sats 96%.    Intake: Pt tolerated clear liquid diet. Gtts: Epi, Dobutamine, Lasix, Heparin    Output: Chest tube x2 to wall suction, total output 40-60 cc/4hr    VAD: HM 2 - speed 9000 (lsl 8600), flows > 5, PI > 5-5.5, [place] > 5.3-5.6     Labs: Accuchecks q6hr, pt not requiring SSI throughout shift. APTT q6hr, heparin gtt titrated for goal PTT of 40-50. Mag & Potassium drawn q6hr, lytes replaced as ordered.    Skin: CDI - heels, elbows, and sacrum w/o redness or breakdown. Sternotomy covered with foam dsg - CDI. Chest tube insertion sites covered with gauze - dsg CDI. VAD dsg changed per hospital policy - driveline site CDI. Wife present for change.

## 2018-03-12 NOTE — ASSESSMENT & PLAN NOTE
- in place  - ICD interrogated, event noted on 2/25/18 with appropriate shock for VT  -Continue Amiodarone

## 2018-03-12 NOTE — PROGRESS NOTES
Notified MD Julienne of pt. 18 beat run VT.  Assessments/ vitals per flowsheet. Will continue to monitor.

## 2018-03-12 NOTE — H&P
Inpatient Radiology Pre-procedure Note    History of Present Illness:  Tim Richards is a 52 y.o. male who presents for ct guided chest tube placement. Has right apical pneumothorax.    Admission H&P reviewed.  Past Medical History:   Diagnosis Date    CHF (congestive heart failure)     Dilated cardiomyopathy 1/10/2018    Disorder of kidney and ureter     CKD    Gout     HTN (hypertension)     Ventricular tachycardia (paroxysmal)      Past Surgical History:   Procedure Laterality Date    CARDIAC CATHETERIZATION  Dec. 2012    CARDIAC DEFIBRILLATOR PLACEMENT Left     CRRT-D    COLONOSCOPY N/A 3/6/2018    Procedure: COLONOSCOPY;  Surgeon: Alonso Bone MD;  Location: [facility] ([address] [place] [address]);  Service: Endoscopy;  Laterality: N/A;       Review of Systems:   As documented in primary team H&P    Home Meds:   Prior to Admission medications    Medication Sig Start Date End Date Taking? Authorizing Provider   allopurinol (ZYLOPRIM) 100 MG tablet TAKE 1 TABLET (100 MG TOTAL) BY MOUTH 3 (THREE) TIMES DAILY. 1/31/18  Yes Carmencita Ng PA-C   amiodarone (PACERONE) 200 MG Tab TAKE 1 TABLET (200 MG TOTAL) BY MOUTH ONCE DAILY. 6/12/17  Yes Nader Chiu MD   bumetanide (BUMEX) 2 MG tablet Take 1 tablet (2 mg total) by mouth 2 (two) times daily. 2/27/18 2/27/19 Yes Antonio Hadley Jr., MD   losartan (COZAAR) 25 MG tablet Take 25 mg by mouth once daily. 12/7/17  Yes Historical Provider, MD   potassium chloride SA (K-DUR,KLOR-CON) 10 MEQ tablet Take 2 tablets (20 mEq total) by mouth 2 (two) times daily. 2/27/18  Yes Antonio Hadley Jr., MD   spironolactone (ALDACTONE) 25 MG tablet Take 1 tablet (25 mg total) by mouth once daily. 1/17/18 1/17/19 Yes Vimal Bernal MD   aspirin (ECOTRIN) 81 MG EC tablet Take 81 mg by mouth. 1 Tablet, Delayed Release (E.C.) Oral Every day  3/12/18 Yes Historical Provider, MD   ERGOCALCIFEROL, VITAMIN D2, (VITAMIN D ORAL) Take by mouth once daily.    Historical Provider, MD   INV  aspirin/placebo tablet Take 1 tablet (81 mg total) by mouth once daily. Investigational Medication. Patient Study #: [id number]. Take one tablet by mouth once daily. 3/12/18   Amy Rhodes MD   metOLazone (ZAROXOLYN) 2.5 MG tablet Take 2.5 mg by mouth every other day. 1/15/18   Historical Provider, MD   metoprolol succinate (TOPROL-XL) 25 MG 24 hr tablet Take 0.5 tablets (12.5 mg total) by mouth once daily. 1/18/18 1/18/19  Vimal Bernal MD   valsartan (DIOVAN) 80 MG tablet Take 0.5 tablets (40 mg total) by mouth 2 (two) times daily. 2/12/18   Antonio Hadley Jr., MD   VITAMIN E ACETATE (VITAMIN E ORAL) Take by mouth once daily.    Historical Provider, MD     Scheduled Meds:    amiodarone  400 mg Oral BID    docusate sodium  200 mg Oral QHS    ferrous gluconate  324 mg Oral Daily with breakfast    INV aspirin/placebo  81 mg Oral Daily    mupirocin   Nasal BID    pantoprazole  40 mg Oral Daily    polyethylene glycol  17 g Oral Daily    potassium chloride SA  40 mEq Oral Once    sodium chloride 0.9%  3 mL Intravenous Q8H    warfarin  2 mg Oral Once     Continuous Infusions:    DOBUTamine 5 mcg/kg/min (03/12/18 1700)    epinephrine infusion 0.05 mcg/kg/min (03/12/18 1700)    furosemide (LASIX) 1 mg/mL infusion (non-titrating) 5 mg/hr (03/12/18 1700)    heparin (porcine) in 5 % dex 1,100 Units/hr (03/12/18 1700)    nitric oxide gas       PRN Meds:acetaminophen, albumin human 5%, albuterol sulfate, bisacodyl, dextrose 50%, dextrose 50%, glucagon (human recombinant), glucose, glucose, insulin aspart U-100, magnesium hydroxide 400 mg/5 ml, oxyCODONE, oxyCODONE  Anticoagulants/Antiplatelets: Heparin    Allergies:   Review of patient's allergies indicates:   Allergen Reactions    Aspirin Other (See Comments)     Prevent II patient- Do NOT order aspirin. Please call Yenny [phone] if patient is admitted to hospital    Lisinopril Anaphylaxis     Sedation Hx: have not been any systemic  reactions    Labs:    Recent Labs  Lab 03/12/18  0506   INR 1.0       Recent Labs  Lab 03/12/18  0506   WBC 13.55*   HGB 6.7*   HCT 20.5*   MCV 82   *      Recent Labs  Lab 03/12/18  0506  03/12/18  1253   *  --   --      --   --    K 3.9  3.9  --  3.7     --   --    CO2 27  --   --    BUN 42*  --   --    CREATININE 2.2*  --   --    CALCIUM 9.4  --   --    MG 2.3  < > 2.2   ALT 21  --   --    AST 55*  --   --    ALBUMIN 2.7*  --   --    BILITOT 3.3*  --   --    BILIDIR 2.8*  --   --    < > = values in this interval not displayed.      Vitals:  Temp: (!) 100.4 °F (38 °C) (03/12/18 1500)  Pulse: 91 (03/12/18 1715)  Resp: (!) 21 (03/12/18 1715)  BP: (!) 86/0 (03/12/18 1100)  SpO2: 99 % (03/12/18 1715)     Physical Exam:  ASA: 3  Mallampati: 1         Plan: ct guided right chest tube placement  Sedation Plan: moderate    Isaiah Mohan MD  Department of Radiology  Pager: [phone]

## 2018-03-12 NOTE — ASSESSMENT & PLAN NOTE
Estimated Creatinine Clearance: 53.8 mL/min (A) (based on SCr of 2.2 mg/dL (H)).  avoid hypoglycemia  Caution with insulin stacking

## 2018-03-12 NOTE — CONSULTS
Spoke to team about PICC placement, will need 48 hours negative cultures and clearance from nephrology with GFR <45. Please re consult when appropriate.

## 2018-03-12 NOTE — CONSULTS
Case and imaging reviewed with staff. Will proceed with CT-guided placement of a right pigtail chest tube for an apical pneumothorax.     1. Please hold anticoagulation.     Ross Nolasco M.D.  Radiology, PGY-V  [phone]

## 2018-03-12 NOTE — SUBJECTIVE & OBJECTIVE
"Interval HPI:   Prandial excursion at lunch time, bg 202.  OOB, low grade temp.  No hypoglycemia.  FBG at goal.  BP (!) 86/0 (BP Location: Left arm, Patient Position: Sitting)   Pulse 85   Temp 99.9 °F (37.7 °C) (Core ([place]))   Resp 20   Ht 6' 1" (1.854 m)   Wt 119.7 kg (263 lb 14.3 oz)   SpO2 100%   BMI 34.82 kg/m²     Labs Reviewed and Include      Recent Labs  Lab 03/12/18  0506 03/12/18  1253   *  --    CALCIUM 9.4  --    ALBUMIN 2.7*  --    PROT 6.3  --      --    K 3.9  3.9 3.7   CO2 27  --      --    BUN 42*  --    CREATININE 2.2*  --    ALKPHOS 79  --    ALT 21  --    AST 55*  --    BILITOT 3.3*  --      Lab Results   Component Value Date    WBC 13.55 (H) 03/12/2018    HGB 6.7 (L) 03/12/2018    HCT 20.5 (L) 03/12/2018    MCV 82 03/12/2018     (L) 03/12/2018     No results for input(s): TSH, FREET4 in the last 168 hours.  Lab Results   Component Value Date    HGBA1C 5.5 03/08/2018       Nutritional status:   Body mass index is 34.82 kg/m².  Lab Results   Component Value Date    ALBUMIN 2.7 (L) 03/12/2018    ALBUMIN 3.0 (L) 03/11/2018    ALBUMIN 3.1 (L) 03/10/2018     Lab Results   Component Value Date    PREALBUMIN 14 (L) 03/08/2018    PREALBUMIN 29 01/23/2018       Estimated Creatinine Clearance: 53.8 mL/min (A) (based on SCr of 2.2 mg/dL (H)).    Accu-Checks  Recent Labs      03/10/18   2025  03/11/18   0018  03/11/18   0425  03/11/18   0739  03/11/18   0913  03/11/18   1119  03/11/18   1638  03/11/18   2251  03/12/18   0820  03/12/18   1257   POCTGLUCOSE  130*  128*  144*  142*  132*  124*  97  132*  121*  202*       Current Medications and/or Treatments Impacting Glycemic Control  Immunotherapy:  Immunosuppressants     None        Steroids:   Hormones     None        Pressors:    Autonomic Drugs     Start     Stop Route Frequency Ordered    03/12/18 0815  EPINEPHrine (ADRENALIN) 4 mg in sodium chloride 0.9% 250 mL infusion     Question Answer Comment   Titrate by: " (in mcg/kg/min) 0.01    Titrate interval: (in minutes) 5    Titrate to maintain: (SBP or MAP or Cardiac Index) MAP    Greater than: (in mmHg) 60    Maximum dose: (in mcg/kg/min) 2        -- IV Continuous 03/12/18 0807        Hyperglycemia/Diabetes Medications: Antihyperglycemics     Start     Stop Route Frequency Ordered    03/12/18 1302  insulin aspart U-100 pen 0-5 Units      -- SubQ Before meals & nightly PRN 03/12/18 1302

## 2018-03-12 NOTE — PROGRESS NOTES
Patient AAOX3, sitting up in chair with wife at bedside. VAD interrogation completed this AM in the event changes needed to be made. Will continue to monitor for further issues.    Reviewed VAD manual, workbook and binder with pt and wife. Wife has already begun watching the DVDs included in packet.     Pulsatile: Yes   VAD Sounds: Smooth  Problems / Issues / Alarms with VAD if any: None noted      VAD Interrogation:  TXP LVAD INTERROGATIONS 3/12/2018 3/12/2018 3/12/2018 3/12/2018 3/12/2018 3/12/2018 3/12/2018   Type HeartMate II HeartMate II HeartMate II HeartMate II HeartMate II HeartMate II HeartMate II   Flow 5.6 5.5 5.6 5.0 5.4 5.6 5.7   Speed 9000 9000 9000 9000 9000 9000 9000   PI 5.8 5.0 5.7 6.5 6.3 5.5 5.9   Power ([place]) 5.5 5.7 5.5 5.3 5.3 5.6 5.6   LSL 8600 8600 8600 8600 8600 8600 8600   Pulsatility Pulse Pulse Pulse Pulse Pulse Pulse Pulse   }

## 2018-03-13 LAB
ANION GAP SERPL CALC-SCNC: 9 MMOL/L
APTT BLDCRRT: 24.6 SEC
APTT BLDCRRT: 27.4 SEC
APTT BLDCRRT: 27.4 SEC
APTT BLDCRRT: <21 SEC
BASOPHILS # BLD AUTO: 0.03 K/UL
BASOPHILS NFR BLD: 0.2 %
BUN SERPL-MCNC: 39 MG/DL
CALCIUM SERPL-MCNC: 9.5 MG/DL
CHLORIDE SERPL-SCNC: 97 MMOL/L
CO2 SERPL-SCNC: 30 MMOL/L
CREAT SERPL-MCNC: 1.9 MG/DL
DIFFERENTIAL METHOD: ABNORMAL
EOSINOPHIL # BLD AUTO: 0.1 K/UL
EOSINOPHIL NFR BLD: 0.8 %
ERYTHROCYTE [DISTWIDTH] IN BLOOD BY AUTOMATED COUNT: 15.5 %
EST. GFR  (AFRICAN AMERICAN): 46.2 ML/MIN/1.73 M^2
EST. GFR  (NON AFRICAN AMERICAN): 39.9 ML/MIN/1.73 M^2
FACT X PPP CHRO-ACNC: 0.1 IU/ML
FACT X PPP CHRO-ACNC: <0.1 IU/ML
GLUCOSE SERPL-MCNC: 124 MG/DL
HCT VFR BLD AUTO: 22.5 %
HGB BLD-MCNC: 7.2 G/DL
IMM GRANULOCYTES # BLD AUTO: 0.16 K/UL
IMM GRANULOCYTES NFR BLD AUTO: 1.2 %
INR PPP: 1
LDH SERPL L TO P-CCNC: 393 U/L
LYMPHOCYTES # BLD AUTO: 1 K/UL
LYMPHOCYTES NFR BLD: 7.5 %
MAGNESIUM SERPL-MCNC: 2.1 MG/DL
MAGNESIUM SERPL-MCNC: 2.1 MG/DL
MAGNESIUM SERPL-MCNC: 2.2 MG/DL
MAGNESIUM SERPL-MCNC: 2.2 MG/DL
MCH RBC QN AUTO: 27.6 PG
MCHC RBC AUTO-ENTMCNC: 32 G/DL
MCV RBC AUTO: 86 FL
MONOCYTES # BLD AUTO: 1.9 K/UL
MONOCYTES NFR BLD: 14.3 %
NEUTROPHILS # BLD AUTO: 10.3 K/UL
NEUTROPHILS NFR BLD: 76 %
NRBC BLD-RTO: 1 /100 WBC
PHOSPHATE SERPL-MCNC: 2.7 MG/DL
PLATELET # BLD AUTO: 159 K/UL
PMV BLD AUTO: 10.6 FL
POCT GLUCOSE: 120 MG/DL (ref 70–110)
POCT GLUCOSE: 145 MG/DL (ref 70–110)
POTASSIUM SERPL-SCNC: 3.2 MMOL/L
POTASSIUM SERPL-SCNC: 3.4 MMOL/L
POTASSIUM SERPL-SCNC: 3.5 MMOL/L
POTASSIUM SERPL-SCNC: 3.6 MMOL/L
POTASSIUM SERPL-SCNC: 4.5 MMOL/L
PROTHROMBIN TIME: 10.1 SEC
RBC # BLD AUTO: 2.61 M/UL
SODIUM SERPL-SCNC: 136 MMOL/L
WBC # BLD AUTO: 13.49 K/UL

## 2018-03-13 PROCEDURE — 84100 ASSAY OF PHOSPHORUS: CPT

## 2018-03-13 PROCEDURE — 63600175 PHARM REV CODE 636 W HCPCS

## 2018-03-13 PROCEDURE — 84132 ASSAY OF SERUM POTASSIUM: CPT | Mod: 91

## 2018-03-13 PROCEDURE — 76937 US GUIDE VASCULAR ACCESS: CPT

## 2018-03-13 PROCEDURE — 99232 SBSQ HOSP IP/OBS MODERATE 35: CPT | Mod: ,,, | Performed by: INTERNAL MEDICINE

## 2018-03-13 PROCEDURE — 20000000 HC ICU ROOM

## 2018-03-13 PROCEDURE — 83735 ASSAY OF MAGNESIUM: CPT | Mod: 91

## 2018-03-13 PROCEDURE — A4216 STERILE WATER/SALINE, 10 ML: HCPCS | Performed by: THORACIC SURGERY (CARDIOTHORACIC VASCULAR SURGERY)

## 2018-03-13 PROCEDURE — 02HV33Z INSERTION OF INFUSION DEVICE INTO SUPERIOR VENA CAVA, PERCUTANEOUS APPROACH: ICD-10-PCS | Performed by: INTERNAL MEDICINE

## 2018-03-13 PROCEDURE — 94761 N-INVAS EAR/PLS OXIMETRY MLT: CPT

## 2018-03-13 PROCEDURE — 85610 PROTHROMBIN TIME: CPT

## 2018-03-13 PROCEDURE — 97535 SELF CARE MNGMENT TRAINING: CPT

## 2018-03-13 PROCEDURE — 83615 LACTATE (LD) (LDH) ENZYME: CPT

## 2018-03-13 PROCEDURE — 93750 INTERROGATION VAD IN PERSON: CPT | Mod: ,,, | Performed by: INTERNAL MEDICINE

## 2018-03-13 PROCEDURE — 85730 THROMBOPLASTIN TIME PARTIAL: CPT

## 2018-03-13 PROCEDURE — 97116 GAIT TRAINING THERAPY: CPT

## 2018-03-13 PROCEDURE — 99233 SBSQ HOSP IP/OBS HIGH 50: CPT | Mod: ,,, | Performed by: SURGERY

## 2018-03-13 PROCEDURE — 25000003 PHARM REV CODE 250: Performed by: STUDENT IN AN ORGANIZED HEALTH CARE EDUCATION/TRAINING PROGRAM

## 2018-03-13 PROCEDURE — 97110 THERAPEUTIC EXERCISES: CPT

## 2018-03-13 PROCEDURE — 84132 ASSAY OF SERUM POTASSIUM: CPT

## 2018-03-13 PROCEDURE — 36569 INSJ PICC 5 YR+ W/O IMAGING: CPT

## 2018-03-13 PROCEDURE — 85730 THROMBOPLASTIN TIME PARTIAL: CPT | Mod: 91

## 2018-03-13 PROCEDURE — 25000003 PHARM REV CODE 250

## 2018-03-13 PROCEDURE — 85025 COMPLETE CBC W/AUTO DIFF WBC: CPT

## 2018-03-13 PROCEDURE — 25000003 PHARM REV CODE 250: Performed by: THORACIC SURGERY (CARDIOTHORACIC VASCULAR SURGERY)

## 2018-03-13 PROCEDURE — 27000248 HC VAD-ADDITIONAL DAY

## 2018-03-13 PROCEDURE — 85520 HEPARIN ASSAY: CPT | Mod: 91

## 2018-03-13 PROCEDURE — 97803 MED NUTRITION INDIV SUBSEQ: CPT

## 2018-03-13 PROCEDURE — 83735 ASSAY OF MAGNESIUM: CPT

## 2018-03-13 PROCEDURE — 63600175 PHARM REV CODE 636 W HCPCS: Performed by: STUDENT IN AN ORGANIZED HEALTH CARE EDUCATION/TRAINING PROGRAM

## 2018-03-13 PROCEDURE — 80048 BASIC METABOLIC PNL TOTAL CA: CPT

## 2018-03-13 PROCEDURE — C1751 CATH, INF, PER/CENT/MIDLINE: HCPCS

## 2018-03-13 PROCEDURE — 63600175 PHARM REV CODE 636 W HCPCS: Performed by: THORACIC SURGERY (CARDIOTHORACIC VASCULAR SURGERY)

## 2018-03-13 RX ORDER — POTASSIUM CHLORIDE 29.8 MG/ML
40 INJECTION INTRAVENOUS ONCE
Status: COMPLETED | OUTPATIENT
Start: 2018-03-13 | End: 2018-03-13

## 2018-03-13 RX ORDER — SODIUM CHLORIDE 0.9 % (FLUSH) 0.9 %
10 SYRINGE (ML) INJECTION
Status: DISCONTINUED | OUTPATIENT
Start: 2018-03-13 | End: 2018-03-21

## 2018-03-13 RX ORDER — POTASSIUM CHLORIDE 20 MEQ/1
40 TABLET, EXTENDED RELEASE ORAL ONCE
Status: COMPLETED | OUTPATIENT
Start: 2018-03-13 | End: 2018-03-13

## 2018-03-13 RX ORDER — WARFARIN 2 MG/1
4 TABLET ORAL DAILY
Status: DISCONTINUED | OUTPATIENT
Start: 2018-03-13 | End: 2018-03-14

## 2018-03-13 RX ORDER — SODIUM CHLORIDE 0.9 % (FLUSH) 0.9 %
10 SYRINGE (ML) INJECTION EVERY 6 HOURS
Status: DISCONTINUED | OUTPATIENT
Start: 2018-03-13 | End: 2018-03-21

## 2018-03-13 RX ORDER — POTASSIUM CHLORIDE 20 MEQ/1
40 TABLET, EXTENDED RELEASE ORAL 2 TIMES DAILY
Status: DISCONTINUED | OUTPATIENT
Start: 2018-03-13 | End: 2018-03-21

## 2018-03-13 RX ORDER — POTASSIUM CHLORIDE 20 MEQ/1
60 TABLET, EXTENDED RELEASE ORAL ONCE
Status: COMPLETED | OUTPATIENT
Start: 2018-03-13 | End: 2018-03-13

## 2018-03-13 RX ADMIN — Medication 3 ML: at 10:03

## 2018-03-13 RX ADMIN — EPINEPHRINE 0.02 MCG/KG/MIN: 1 INJECTION INTRAMUSCULAR; INTRAVENOUS; SUBCUTANEOUS at 11:03

## 2018-03-13 RX ADMIN — PANTOPRAZOLE SODIUM 40 MG: 40 TABLET, DELAYED RELEASE ORAL at 08:03

## 2018-03-13 RX ADMIN — EPINEPHRINE 0.04 MCG/KG/MIN: 1 INJECTION INTRAMUSCULAR; INTRAVENOUS; SUBCUTANEOUS at 04:03

## 2018-03-13 RX ADMIN — OXYCODONE HYDROCHLORIDE 10 MG: 5 TABLET ORAL at 05:03

## 2018-03-13 RX ADMIN — OXYCODONE HYDROCHLORIDE 10 MG: 5 TABLET ORAL at 03:03

## 2018-03-13 RX ADMIN — AMIODARONE HYDROCHLORIDE 400 MG: 200 TABLET ORAL at 08:03

## 2018-03-13 RX ADMIN — Medication 3 ML: at 02:03

## 2018-03-13 RX ADMIN — POTASSIUM CHLORIDE 40 MEQ: 1500 TABLET, EXTENDED RELEASE ORAL at 08:03

## 2018-03-13 RX ADMIN — POTASSIUM CHLORIDE 40 MEQ: 1500 TABLET, EXTENDED RELEASE ORAL at 07:03

## 2018-03-13 RX ADMIN — POLYETHYLENE GLYCOL 3350 17 G: 17 POWDER, FOR SOLUTION ORAL at 08:03

## 2018-03-13 RX ADMIN — DOBUTAMINE IN DEXTROSE 4 MCG/KG/MIN: 200 INJECTION, SOLUTION INTRAVENOUS at 04:03

## 2018-03-13 RX ADMIN — Medication 3 ML: at 05:03

## 2018-03-13 RX ADMIN — Medication 81 MG: at 08:03

## 2018-03-13 RX ADMIN — POTASSIUM CHLORIDE 60 MEQ: 1500 TABLET, EXTENDED RELEASE ORAL at 02:03

## 2018-03-13 RX ADMIN — MUPIROCIN: 20 OINTMENT TOPICAL at 08:03

## 2018-03-13 RX ADMIN — DOCUSATE SODIUM 200 MG: 100 CAPSULE, LIQUID FILLED ORAL at 08:03

## 2018-03-13 RX ADMIN — OXYCODONE HYDROCHLORIDE 10 MG: 5 TABLET ORAL at 10:03

## 2018-03-13 RX ADMIN — POTASSIUM CHLORIDE 40 MEQ: 1500 TABLET, EXTENDED RELEASE ORAL at 10:03

## 2018-03-13 RX ADMIN — HEPARIN SODIUM 1400 UNITS/HR: 10000 INJECTION, SOLUTION INTRAVENOUS at 01:03

## 2018-03-13 RX ADMIN — POTASSIUM CHLORIDE 40 MEQ: 400 INJECTION, SOLUTION INTRAVENOUS at 01:03

## 2018-03-13 RX ADMIN — WARFARIN SODIUM 4 MG: 2 TABLET ORAL at 06:03

## 2018-03-13 RX ADMIN — OXYCODONE HYDROCHLORIDE 5 MG: 5 TABLET ORAL at 07:03

## 2018-03-13 RX ADMIN — Medication 10 ML: at 06:03

## 2018-03-13 NOTE — ASSESSMENT & PLAN NOTE
-NICM   -EF: <10%; LVEDD: 9.7 cm  -now s/p HM II LVAD  -diuresing with Lasix drip: net negative 2.2 L over last 24 hours and CVP is 8

## 2018-03-13 NOTE — PROGRESS NOTES
" [facility]  Adult Nutrition  Progress Note    SUMMARY       Recommendations  Recommendation/Intervention:   1. When medically able, ADAT to Cardiac with texture per SLP.   2. CSU RD to follow-up with cardiac and coumadin diet education when appropriate.   RD to monitor.    Goals: Patient to receive nutrition by RD follow-up  Nutrition Goal Status: goal not met  Communication of RD Recs:  (POC)    Reason for Assessment  Reason for Assessment: consult  Diagnosis: cardiac disease (s/p LVAD)  Relevant Medical History: CHF, CKD3, HTN  General Information Comments: S/p LVAD 3/8. Chest closure 3/10. Extubated 3/11. On CL diet.  Nutrition Discharge Planning: Adequate nutrition via PO intake.    Nutrition Risk Screen  Nutrition Risk Screen: no indicators present    Nutrition/Diet History  Do you have any cultural, spiritual, Hoahaoism conflicts, given your current situation?: none  Factors Affecting Nutritional Intake: clear liquid diet    Anthropometrics  Temp: 96.7 °F (35.9 °C)  Height Method: Stated  Height: 6' 1" (185.4 cm)  Height (inches): 73 in  Weight Method: Bed Scale  Weight: 124.6 kg (274 lb 11.1 oz)  Weight (lb): 274.7 lb  Ideal Body Weight (IBW), Male: 184 lb  % Ideal Body Weight, Male (lb): 148.57 lb  BMI (Calculated): 36.1  BMI Grade: 35 - 39.9 - obesity - grade II  Usual Body Weight (UBW), k kg  % Usual Body Weight: 95.99    Lab/Procedures/Meds  Pertinent Labs Reviewed: reviewed  Pertinent Labs Comments: BUN 39, Creat 1.9, Glu 124, POCT Glu 108-202  Pertinent Medications Reviewed: reviewed  Pertinent Medications Comments: docusate, pantoprazole, coumadin, dobutamine, epinephrine, lasix    Physical Findings/Assessment  Overall Physical Appearance: on oxygen therapy  Tubes:  (chest tubes x2)  Oral/Mouth Cavity: WDL  Skin: edema, incision(s)    Estimated/Assessed Needs  Weight Used For Calorie Calculations: 124.6 kg (274 lb 11.1 oz)  Height: 6' 1" (185.4 cm)  Energy Calorie " Requirements (kcal): 2371 kcal/day  Energy Need Method: Kings-St Jeor (x 1.1)  Vtot (L/Min) for [place] Equation Calculation: 10.2  RMR (Kings-St. Jeor Equation): 2154.88  RMR ([place] State Equation): 2444.64  RMR (Modified [place] Equation): 2270.71  Protein Requirements: 150 g/day (1.2 g/kg)  Weight Used For Protein Calculations: 124.6 kg (274 lb 11.1 oz)  Fluid Requirements (mL): per MD  RDA Method (mL): 2371     Nutrition Prescription Ordered  Current Diet Order: CL    Evaluation of Received Nutrient/Fluid Intake  I/O: -2.2L x 24hrs, -10.2L since admit, good UOP  Comments: LBM 3/6  % Intake of Estimated Energy Needs: 0 - 25 %  % Meal Intake: Other: CL diet    Nutrition Risk  Level of Risk/Frequency of Follow-up: high (2x/week)     Assessment and Plan  LVAD (left ventricular assist device) present    Contributing Nutrition Diagnosis  Increased nutrient needs    Related to (etiology):   Physiological causes    Signs and Symptoms (as evidenced by):   S/p LVAD    Nutrition Diagnosis Status:   Continues          Monitor and Evaluation  Food and Nutrient Intake: energy intake  Food and Nutrient Adminstration: diet order  Knowledge/Beliefs/Attitudes: food and nutrition knowledge/skill  Physical Activity and Function: nutrition-related ADLs and IADLs  Anthropometric Measurements: weight, weight change  Biochemical Data, Medical Tests and Procedures: electrolyte and renal panel, gastrointestinal profile, inflammatory profile  Nutrition-Focused Physical Findings: overall appearance     Nutrition Follow-Up  RD Follow-up?: Yes

## 2018-03-13 NOTE — SUBJECTIVE & OBJECTIVE
Interval History: IR unable to place chest tube yesterday.  CXR looks stable.  Weaning Epi.  Was given 1 unit PRBC's yesterday; NO weaned off and Amiodarone infusion stopped.  He reports passing a lot of gas but no Bowel movements despite receiving Miralax.  He has some SOB but other wise no complaints.     Continuous Infusions:   DOBUTamine 4 mcg/kg/min (03/13/18 1000)    epinephrine infusion 0.03 mcg/kg/min (03/13/18 1000)    furosemide (LASIX) 1 mg/mL infusion (non-titrating) 10 mg/hr (03/13/18 1000)    heparin (porcine) in 5 % dex 1,400 Units/hr (03/13/18 1000)     Scheduled Meds:   amiodarone  400 mg Oral BID    docusate sodium  200 mg Oral QHS    ferrous gluconate  324 mg Oral Daily with breakfast    INV aspirin/placebo  81 mg Oral Daily    pantoprazole  40 mg Oral Daily    polyethylene glycol  17 g Oral Daily    potassium chloride SA  40 mEq Oral BID    sodium chloride 0.9%  3 mL Intravenous Q8H    warfarin  4 mg Oral Daily     PRN Meds:acetaminophen, albumin human 5%, albuterol sulfate, bisacodyl, dextrose 50%, dextrose 50%, glucagon (human recombinant), glucose, glucose, insulin aspart U-100, magnesium hydroxide 400 mg/5 ml, oxyCODONE, oxyCODONE    Review of patient's allergies indicates:   Allergen Reactions    Lisinopril Anaphylaxis     Objective:     Vital Signs (Most Recent):  Temp: 96.7 °F (35.9 °C) (03/13/18 0700)  Pulse: 83 (03/13/18 1030)  Resp: 20 (03/13/18 1030)  BP: (!) 92/0 (03/13/18 0415)  SpO2: 100 % (03/13/18 1030) Vital Signs (24h Range):  Temp:  [96.7 °F (35.9 °C)-100.4 °F (38 °C)] 96.7 °F (35.9 °C)  Pulse:  [] 83  Resp:  [11-27] 20  SpO2:  [86 %-100 %] 100 %  BP: (84-92)/(0-70) 92/0  Arterial Line BP: ()/() 88/84     Patient Vitals for the past 72 hrs (Last 3 readings):   Weight   03/12/18 1721 124.6 kg (274 lb 11.1 oz)   03/11/18 0500 119.7 kg (263 lb 14.3 oz)     Body mass index is 36.24 kg/m².      Intake/Output Summary (Last 24 hours) at 03/13/18  1106  Last data filed at 03/13/18 1000   Gross per 24 hour   Intake          1988.73 ml   Output             3855 ml   Net         -1866.27 ml     Hemodynamic Parameters:    Physical Exam   Constitutional: He appears well-developed and well-nourished. No distress.   HENT:   Head: Normocephalic and atraumatic.   Neck: Normal range of motion. No JVD present.   Cardiovascular: Normal rate.  Exam reveals no friction rub.    No murmur heard.  Normal VAD hum   Pulmonary/Chest: Effort normal. No respiratory distress. He has no wheezes. He has rales.   Faint left lower lobe   Abdominal: Soft. He exhibits no distension.   Musculoskeletal: Normal range of motion. He exhibits no edema.   Neurological: He is alert.   Skin: Skin is warm. Capillary refill takes 2 to 3 seconds. He is not diaphoretic. No erythema.     Significant Labs:  CBC:    Recent Labs  Lab 03/12/18  0506 03/12/18  1919 03/13/18  0253   WBC 13.55* 13.53* 13.49*   RBC 2.49* 2.67* 2.61*   HGB 6.7* 7.4* 7.2*   HCT 20.5* 23.1* 22.5*   * 151 159   MCV 82 87 86   MCH 26.9* 27.7 27.6   MCHC 32.7 32.0 32.0     BNP:    Recent Labs  Lab 03/07/18  0258 03/09/18  0308 03/12/18  0506   BNP 1,110* 1,114* 1,419*     CMP:    Recent Labs  Lab 03/10/18  0400  03/11/18  0422 03/11/18  0733  03/12/18  0506  03/13/18  0011 03/13/18  0253 03/13/18  0604   *  < > 145*  145*  145* 137*  --  131*  --   --  124*  --    CALCIUM 9.3  < > 9.5  9.5  9.5 9.4  --  9.4  --   --  9.5  --    ALBUMIN 3.1*  --  3.0*  --   --  2.7*  --   --   --   --    PROT 6.1  --  6.4  --   --  6.3  --   --   --   --      < > 138  138  138 138  --  139  --   --  136  --    K 3.8  < > 3.6  3.6  3.6 3.4*  < > 3.9  3.9  < > 3.2* 4.5 3.6   CO2 27  < > 27  27  27 26  --  27  --   --  30*  --    CL 98  < > 99  99  99 99  --  100  --   --  97  --    BUN 25*  < > 37*  37*  37* 39*  --  42*  --   --  39*  --    CREATININE 2.2*  < > 2.6*  2.6*  2.6* 2.7*  --  2.2*  --   --  1.9*  --     ALKPHOS 60  --  63  --   --  79  --   --   --   --    ALT 21  --  16  --   --  21  --   --   --   --    AST 56*  --  44*  --   --  55*  --   --   --   --    BILITOT 2.0*  --  2.4*  --   --  3.3*  --   --   --   --    < > = values in this interval not displayed.   Coagulation:     Recent Labs  Lab 03/11/18  0733  03/12/18  0506 03/12/18  1253 03/12/18  1918 03/13/18  0253   INR 0.9  --  1.0  --   --  1.0   APTT 27.1  < > 25.0 28.2 <21.0 <21.0   < > = values in this interval not displayed.  LDH:    Recent Labs  Lab 03/11/18  0422 03/12/18  0506 03/13/18  0253   * 373* 393*     Microbiology:  Microbiology Results (last 7 days)     Procedure Component Value Units Date/Time    Blood culture [[id number]] Collected:  03/11/18 0650    Order Status:  Completed Specimen:  Blood from Peripheral, Hand, Right Updated:  03/13/18 1012     Blood Culture, Routine No Growth to date     Blood Culture, Routine No Growth to date     Blood Culture, Routine No Growth to date    Blood culture [[id number]] Collected:  03/11/18 0651    Order Status:  Completed Specimen:  Blood from Peripheral, Wrist, Right Updated:  03/13/18 1012     Blood Culture, Routine No Growth to date     Blood Culture, Routine No Growth to date     Blood Culture, Routine No Growth to date    Culture, Respiratory with Gram Stain [[id number]] Collected:  03/11/18 0800    Order Status:  Completed Specimen:  Respiratory from Endotracheal Aspirate Updated:  03/13/18 0856     Respiratory Culture Further report to follow     Gram Stain (Respiratory) <10 epithelial cells per low power field.     Gram Stain (Respiratory) Few WBC's     Gram Stain (Respiratory) Rare Gram positive cocci     Gram Stain (Respiratory) Rare Gram positive rods    Blood culture [[id number]] Collected:  03/06/18 1107    Order Status:  Completed Specimen:  Blood Updated:  03/11/18 1612     Blood Culture, Routine No growth after 5 days.    Blood culture [[id number]] Collected:  03/06/18 1107    Order  Status:  Completed Specimen:  Blood Updated:  03/11/18 1612     Blood Culture, Routine No growth after 5 days.    IV catheter culture [[id number]] Collected:  03/07/18 1151    Order Status:  Completed Specimen:  Catheter Tip from Catheter Tip, Intrajugular Updated:  03/10/18 0659     Aerobic Culture - Cath tip No growth    Blood culture [[id number]]     Order Status:  Canceled Specimen:  Blood     Blood culture [[id number]]     Order Status:  Canceled Specimen:  Blood           I have reviewed all pertinent labs within the past 24 hours.    Estimated Creatinine Clearance: 63.6 mL/min (A) (based on SCr of 1.9 mg/dL (H)).    Diagnostic Results:  I have reviewed and interpreted all pertinent imaging results/findings within the past 24 hours.

## 2018-03-13 NOTE — PT/OT/SLP PROGRESS
Physical Therapy Co-Treatment with OT    Patient Name:  Tim Richards   MRN:  [id number]    Recommendations:     Discharge Recommendations:  home health PT   Discharge Equipment Recommendations: none   Barriers to discharge: None    Assessment:     Tim Richards is a 52 y.o. male admitted with a medical diagnosis of Acute decompensated heart failure.  He presents with the following impairments/functional limitations:  weakness, impaired endurance, impaired functional mobilty, gait instability, impaired balance, decreased lower extremity function pt meredith treatment better being able to gait train farther. Pt will benefit from skilled PT 6x/wk to progress physically and return pt to Independent status. Pt will be able to discharge home when medically stable and may need HHPT. Pt will not need DME. Pt is s/p LVAD HM2 placement 3/8, revision of out flow graft 3/9 chest closure 3/10/18..    Rehab Prognosis:  good; patient would benefit from acute skilled PT services to address these deficits and reach maximum level of function.      Recent Surgery: Procedure(s) (LRB):  CLOSURE-STERNAL WOUND (N/A)  PLACEMENT-NEOPERICARDIUM 3 Days Post-Op    Plan:     During this hospitalization, patient to be seen 6 x/week to address the above listed problems via gait training, therapeutic activities, therapeutic exercises  · Plan of Care Expires:  04/11/18   Plan of Care Reviewed with: patient, spouse    Subjective     Communicated with nurse prior to session.  Patient found sitting in chair  upon PT entry to room, agreeable to treatment.      Chief Complaint: pt c/o pain during treatment and dizziness with standing.   Patient comments/goals:  To get better and go home.   Pain/Comfort:  · Pain Rating 1: 7/10  · Location 1:  (chest and stomach)  · Pain Rating Post-Intervention 1: 7/10    Patients cultural, spiritual, Yazidi conflicts given the current situation: none    Objective:     Patient found with: telemetry, arterial  line, pulse ox (continuous), central line, oxygen, chest tube, PICC line (CVP)     General Precautions: Standard, fall, LVAD, sternal   Orthopedic Precautions:N/A   Braces:       Functional Mobility:  Pt needed verbal cues for functional mobility with sternal precautions.   · Bed Mobility:  Not tested. Pt was sitting in chair for treatment.   ·   · Transfers:  Sit to Stand:  minimum assistance with no AD   ·   · Gait: 28 ft with min assist. Pt stood at sink with CGA and performed ADLS with OT.       AM-PAC 6 CLICK MOBILITY  Turning over in bed (including adjusting bedclothes, sheets and blankets)?: 3  Sitting down on and standing up from a chair with arms (e.g., wheelchair, bedside commode, etc.): 3  Moving from lying on back to sitting on the side of the bed?: 3  Moving to and from a bed to a chair (including a wheelchair)?: 3  Need to walk in hospital room?: 3  Climbing 3-5 steps with a railing?: 2  Total Score: 17         Patient left up in chair with all lines intact, call button in reach and wife present..    GOALS:    Physical Therapy Goals        Problem: Physical Therapy Goal    Goal Priority Disciplines Outcome Goal Variances Interventions   Physical Therapy Goal     PT/OT, PT Ongoing (interventions implemented as appropriate)     Description:  Goals to be met by: 3/26     Patient will increase functional independence with mobility by performin. Supine to sit with Stand-by Assistance-not met  2. Sit to supine with Stand-by Assistance-not met  3. Sit to stand transfer with Stand-by Assistance -not met  4. Gait  x 100 feet with Stand-by Assistance -not met                      Time Tracking:     PT Received On: 18  PT Start Time: 956     PT Stop Time:   PT Total Time (min): 16 min     Billable Minutes: Gait Training 16 min    Treatment Type: Other (see comments) (co-treatment with OT)  PT/PTA: PT     PTA Visit Number: 0     Nayana Alvarez, PT  2018

## 2018-03-13 NOTE — PROGRESS NOTES
Pt disconnected from wall monitor and wall O2, and connected to portable monitor. Pt transferred to IR - portable telemetry and ambu bag present. Unable to place chest tube drain, please see MD note. Pt transferred back to SICU [id number] and reconnected to wall monitor and wall O2. Pt reassessed, findings concur with prior assessments. Vital signs remained appropriate for this particular patient during transport and during of procedure.

## 2018-03-13 NOTE — PT/OT/SLP PROGRESS
Occupational Therapy   Treatment    Name: Tim Richards  MRN: [id number]  Admitting Diagnosis:  Acute decompensated heart failure s/p LVAD  3 Days Post-Op    Recommendations:     Discharge Recommendations: home  Discharge Equipment Recommendations:  none  Barriers to discharge:  None    Subjective     Communicated with: RN prior to session.  Pain/Comfort:  · Pain Rating 1: 0/10  · Pain Rating Post-Intervention 1: 0/10    Patients cultural, spiritual, Confucianism conflicts given the current situation: none reported    Objective:     Patient found with: telemetry, pulse ox (continuous), PICC line, central line, chest tube, sun catheter, blood pressure cuff    General Precautions: Standard, fall, sternal, LVAD   Orthopedic Precautions:    Braces:       Occupational Performance:    Bed Mobility:    · NT     Functional Mobility/Transfers:  · Patient completed Sit <> Stand Transfer with minimum assistance  with  no assistive device   · Functional Mobility: Minimal A with B HHA to/from sink    Activities of Daily Living:  · Grooming: contact guard assistance standing at sink  · UB Dressing: maximal assistance    · LB Dressing: maximal assistance      Patient left up in chair with all lines intact, call button in reach, RN notified and spouse present    [facility] 6 Click:  [facility] Total Score: 13    Treatment & Education:  Pt ed on OT POC  Pt re-ed on sternal precautions during ADL  [place] in proper place and DL secured  Pt required cueing to secure controller to self prior to sit-to-stand transfer  Pt instructed in ROM ex's to be performed 10x 3-4x daily; pt required assistance to complete.   Education:    Assessment:     Tim Richards is a 52 y.o. male with a medical diagnosis of Acute decompensated heart failure.  He presents s/p LVAD.  Performance deficits affecting function are impaired self care skills, impaired balance, weakness, impaired endurance, impaired functional mobilty, gait instability, decreased upper  extremity function, impaired cardiopulmonary response to activity.      Rehab Prognosis:  Good; patient would benefit from acute skilled OT services to address these deficits and reach maximum level of function.       Plan:     Patient to be seen 6 x/week to address the above listed problems via self-care/home management, therapeutic activities, therapeutic exercises  · Plan of Care Expires: 04/11/18  · Plan of Care Reviewed with: spouse, patient    This Plan of care has been discussed with the patient who was involved in its development and understands and is in agreement with the identified goals and treatment plan    GOALS:    Occupational Therapy Goals        Problem: Occupational Therapy Goal    Goal Priority Disciplines Outcome Interventions   Occupational Therapy Goal     OT, PT/OT Ongoing (interventions implemented as appropriate)    Description:  Goals to be met by: 3/26/18     Patient will increase functional independence with ADLs by performing:    Feeding with [place].  UE Dressing with Supervision.  LE Dressing with Supervision.  Grooming while standing at sink with Supervision.  Toileting from toilet with Supervision for hygiene and clothing management.   Toilet transfer to toilet with Supervision.  Pt will be (I) with VAD management during ADL.                      Time Tracking:     OT Date of Treatment: 03/13/18  OT Start Time: 0946  OT Stop Time: 1009  OT Total Time (min): 23 min    Billable Minutes:Self Care/Home Management 13  Therapeutic Exercise 10    CLIFF Garcia  3/13/2018

## 2018-03-13 NOTE — CARE UPDATE
Computer glance, bg below goal. Will sign off today.  Re-consult if needed.   ** Please call Endocrine for any BG related issues **  Roberta Loyola, NATANAEL  [phone]

## 2018-03-13 NOTE — PLAN OF CARE
Problem: Physical Therapy Goal  Goal: Physical Therapy Goal  Goals to be met by: 3/26     Patient will increase functional independence with mobility by performin. Supine to sit with Stand-by Assistance-not met  2. Sit to supine with Stand-by Assistance-not met  3. Sit to stand transfer with Stand-by Assistance -not met  4. Gait  x 100 feet with Stand-by Assistance -not met    Goals remain appropriate. Nayana Alvarez, PT 3/13/2018

## 2018-03-13 NOTE — SUBJECTIVE & OBJECTIVE
Subjective:     Post-Op Info:  Procedure(s) (LRB):  CLOSURE-STERNAL WOUND (N/A)  PLACEMENT-NEOPERICARDIUM   3 Days Post-Op        Interval History:   NAEON. No chest tube placed by IR, pneumo too small for safe placement. Up in chair.       Medications:  Continuous Infusions:   DOBUTamine 4 mcg/kg/min (03/13/18 1400)    epinephrine infusion 0.03 mcg/kg/min (03/13/18 1400)    furosemide (LASIX) 1 mg/mL infusion (non-titrating) 10 mg/hr (03/13/18 1400)    heparin (porcine) in 5 % dex Stopped (03/13/18 1407)     Scheduled Meds:   amiodarone  400 mg Oral BID    docusate sodium  200 mg Oral QHS    ferrous gluconate  324 mg Oral Daily with breakfast    INV aspirin/placebo  81 mg Oral Daily    pantoprazole  40 mg Oral Daily    polyethylene glycol  17 g Oral Daily    potassium chloride SA  40 mEq Oral BID    sodium chloride 0.9%  3 mL Intravenous Q8H    warfarin  4 mg Oral Daily     PRN Meds:acetaminophen, albumin human 5%, albuterol sulfate, bisacodyl, dextrose 50%, dextrose 50%, glucagon (human recombinant), glucose, glucose, insulin aspart U-100, magnesium hydroxide 400 mg/5 ml, oxyCODONE, oxyCODONE     Objective:     Vital Signs (Most Recent):  Temp: 97.6 °F (36.4 °C) (03/13/18 1100)  Pulse: 89 (03/13/18 1400)  Resp: 20 (03/13/18 1400)  BP: 100/73 (03/13/18 1400)  SpO2: 96 % (03/13/18 1400) Vital Signs (24h Range):  Temp:  [96.7 °F (35.9 °C)-100.4 °F (38 °C)] 97.6 °F (36.4 °C)  Pulse:  [] 89  Resp:  [10-28] 20  SpO2:  [86 %-100 %] 96 %  BP: ()/(0-77) 100/73  Arterial Line BP: ()/() 78/71       Intake/Output Summary (Last 24 hours) at 03/13/18 1445  Last data filed at 03/13/18 1300   Gross per 24 hour   Intake          1538.73 ml   Output             3930 ml   Net         -2391.27 ml       Physical Exam   Constitutional: He is oriented to person, place, and time. He appears well-developed and well-nourished. No distress.   Cardiovascular: Normal rate.    Mechanical hum    Pulmonary/Chest: Effort normal. No respiratory distress.   Chest tube with serosang output   Abdominal: Soft. He exhibits no distension.   Neurological: He is alert and oriented to person, place, and time.   Skin: Skin is warm and dry.   Psychiatric: He has a normal mood and affect. His behavior is normal.       Significant Labs:  BMP:   Recent Labs  Lab 03/13/18 0253 03/13/18  1322   *  --   --      --   --    K 4.5  < > 3.4*   CL 97  --   --    CO2 30*  --   --    BUN 39*  --   --    CREATININE 1.9*  --   --    CALCIUM 9.5  --   --    MG  --   < > 2.1   < > = values in this interval not displayed.  CBC:   Recent Labs  Lab 03/13/18 0253   WBC 13.49*   RBC 2.61*   HGB 7.2*   HCT 22.5*      MCV 86   MCH 27.6   MCHC 32.0     Coagulation:   Recent Labs  Lab 03/13/18 0253 03/13/18  1322   INR 1.0  --    APTT <21.0 27.4  27.4       Significant Diagnostics:  CXR: I have reviewed all pertinent results/findings within the past 24 hours    Procedure: Device Interrogation Including analysis of device parameters  Current Settings: Ventricular Assist Device  Review of device function is stable  TXP LVAD INTERROGATIONS 3/13/2018 3/13/2018 3/13/2018 3/13/2018 3/13/2018 3/13/2018 3/13/2018   Type HeartMate II HeartMate II HeartMate II HeartMate II HeartMate II HeartMate II HeartMate II   Flow 5.7 5.1 5.4 5.1 5.3 5.2 5.3   Speed 9000 9000 9000 9000 9000 9000 9000   PI 5.4 4.7 5.9 6.7 6.6 6.6 6.2   Power ([place]) 5.8 5.7 5.5 5.2 5.3 5.3 5.3   LSL 8600 8600 8600 8600 8600 8600 8600   Pulsatility Pulse Pulse Pulse Pulse Pulse Pulse Pulse

## 2018-03-13 NOTE — ASSESSMENT & PLAN NOTE
-HeartMate II Implanted 3/8/18 as DT with repositioning of outflow graft 3/9/18.  S/p chest closure 3/10/18  -CTS Primary  -Implanted by Dr. Kaufman  -Anticoagulation per Primary Coumadin  -Antiplatelets; Patient is enrolled in PREVENT II  -Speed set at 9000, LSL 8600 rpm  -Interrogation notable for no events  -Not listed for OHTx  - On Epi, and ; wean as tolerated.  NO off

## 2018-03-13 NOTE — PLAN OF CARE
Problem: Patient Care Overview  Goal: Plan of Care Review  Outcome: Ongoing (interventions implemented as appropriate)  Patient is A/A/Ox4, MAXWELL and FC.  VSS, patient is afebrile. O2 sats 100% on 2 L NC.  Midline dressing intact.  2 CT remain in place with serosanguinous output.  Abdomen is rounded, BS hypoactive, no BM this shift. Appetite fair. Denies nausea.  LVAD dressing intact, speeds and flows and charted. No acute events.  Lasix infusing @ 10 mg/hr. Urine output 125-350 ml/hr.  Dobutamine is @ 5 mcg/kg/min. Epinephrine currentlt @ 0.04 mcg/kg/min.  Heparin gtt infusing @ 1200 u/hr, titrating to goal aPTT of 40-50.  Monitor vital signs. Monitor drains and dressings. Protect skin and prevent breakdown. Monitor labs and renal function. Replace as appropriate. Wean O2 as tolerated. Titrate drips per MD. Control pain. Increase activity as tolerated. Up to chair this AM.   Plan of care reviewed with patient and spouse.

## 2018-03-13 NOTE — ASSESSMENT & PLAN NOTE
Neuro:   -off sedation  -pain controlled with oxycodone     Pulmonary:   - extubated 3/11  - Scheduled duo-nebs  - Nitric. Wean per CTS  - R side PTX first noted after extubation 3/11, CXR 3/12 showed increased size, went to IR, IR saw smaller PTX, chest tube canceled, 3/13 PTX smaller in size on CXR.      Cardiac:  - s/p LVAD 3/8/18, outflow tract revision on 3/9, and chest closure 3/10  - Pressors: Epi, dobutamine  - Wean pressors per CTS  - Amiodarone drip for arrhythmias     Renal:   - CKD; baseline Cr. 1.7  - WAGNER on 3/8 and 3/11, improving  - [place] in place  - Lasix drip  - Monitor UOP     Infectious Disease:   -Leukocytosis on 3/8 after initial surgery, improved, stable at 14, trend WBC  -Low grade fevers on 3/8 after initial surgery, torsten to >38C on 3/10  -Blood cultures 3/6 NG, 3/11 NGTD  -vancomycin and doxycycline on 3/8  -cefazolin for surgery 3/9 and 3/10  -fluconazole, cefepime, bacitracin IM started 3/8, continuing     Hematology/Oncology:  - Trend CBC, H/H slowly trending down, transfuse per CTS  - iron supplmentation  - On heparin drip, started warfarin 3/12     Endocrine:  - Insuline gtt  - Endocrine on board     Fluids/Electrolytes/Nutrition/GI:   - tolerating PO intake, advance as tolerated  - Trend CMP  - Replace Lytes PRN      Dispo:  Continue SICU care. CTS primary.

## 2018-03-13 NOTE — ASSESSMENT & PLAN NOTE
- in place  - ICD interrogated, event noted on 2/25/18 with appropriate shock for VT  -Continue Amiodarone po

## 2018-03-13 NOTE — ASSESSMENT & PLAN NOTE
Neuro:   - Pain mgmt: oxycodone PRN     Pulmonary:   - Wean supplemental oxygen   - Frequent IS  - Daily chest xray, pneumo stable     Cardiac:   - Drips: dobutamine 4, epi 0.03  - Wean epi 0.01 q12h  - No aspirin- Prevent II trial   - PO amiodarone     Renal:    - -200/hr  - BUN/Cr down to 1.9  - Lasix gtt at 10  - Discontinue sun     Hepatic:  LFTs MWF     ID:   - Cultures NGTD     Hem/Onc:   - Monitor hgb; stable  - Heparin gtt, PTT goal 40-50     Endocrine:    - Insulin gtt  - Endocrine following      Fluids/Electrolytes/Nutrition/GI:   - Replace electrolytes PRN per protocol  - Clear liquids until return of bowel function, then advance    DISPO:  - Continue SICU care

## 2018-03-13 NOTE — PROCEDURES
Patient sitting in chair; NAD.  Family is at bedside  VAD interrogation completed this AM in the event changes needed to be made. Will continue to monitor for further issues.     Pulsatile: Yes   VAD Sounds: Smooth  Problems / Issues / Alarms with VAD if any: None noted      VAD Interrogation:  TXP LVAD INTERROGATIONS 3/13/2018 3/13/2018 3/13/2018 3/13/2018 3/13/2018 3/13/2018 3/13/2018   Type HeartMate II HeartMate II HeartMate II HeartMate II HeartMate II HeartMate II HeartMate II   Flow 5.1 5.3 5.2 5.3 5.3 5.4 5.4   Speed 9000 9000 9000 9000 9000 9000 9000   PI 6.7 6.6 6.6 6.2 6.2 6.1 5.8   Power ([place]) 5.2 5.3 5.3 5.3 5.4 5.3 5.4   LSL 8600 8600 8600 8600 - - -   Pulsatility Pulse Pulse Pulse Pulse - - -   }

## 2018-03-13 NOTE — PROGRESS NOTES
[facility]  Cardiothoracic Surgery  Progress Note    Patient Name: Tim Richards  MRN: [id number]  Admission Date: 3/1/2018  Hospital Length of Stay: 12 days  Code Status: Full Code   Attending Physician: Yg Kaufman MD   Referring Provider: Amy Rhodes MD  Principal Problem:Acute decompensated heart failure    Subjective:     Post-Op Info:  Procedure(s) (LRB):  CLOSURE-STERNAL WOUND (N/A)  PLACEMENT-NEOPERICARDIUM   3 Days Post-Op     Subjective:     Post-Op Info:  Procedure(s) (LRB):  CLOSURE-STERNAL WOUND (N/A)  PLACEMENT-NEOPERICARDIUM   3 Days Post-Op        Interval History:   NAEON. No chest tube placed by IR, pneumo too small for safe placement. Up in chair.       Medications:  Continuous Infusions:   DOBUTamine 4 mcg/kg/min (03/13/18 1400)    epinephrine infusion 0.03 mcg/kg/min (03/13/18 1400)    furosemide (LASIX) 1 mg/mL infusion (non-titrating) 10 mg/hr (03/13/18 1400)    heparin (porcine) in 5 % dex Stopped (03/13/18 1407)     Scheduled Meds:   amiodarone  400 mg Oral BID    docusate sodium  200 mg Oral QHS    ferrous gluconate  324 mg Oral Daily with breakfast    INV aspirin/placebo  81 mg Oral Daily    pantoprazole  40 mg Oral Daily    polyethylene glycol  17 g Oral Daily    potassium chloride SA  40 mEq Oral BID    sodium chloride 0.9%  3 mL Intravenous Q8H    warfarin  4 mg Oral Daily     PRN Meds:acetaminophen, albumin human 5%, albuterol sulfate, bisacodyl, dextrose 50%, dextrose 50%, glucagon (human recombinant), glucose, glucose, insulin aspart U-100, magnesium hydroxide 400 mg/5 ml, oxyCODONE, oxyCODONE     Objective:     Vital Signs (Most Recent):  Temp: 97.6 °F (36.4 °C) (03/13/18 1100)  Pulse: 89 (03/13/18 1400)  Resp: 20 (03/13/18 1400)  BP: 100/73 (03/13/18 1400)  SpO2: 96 % (03/13/18 1400) Vital Signs (24h Range):  Temp:  [96.7 °F (35.9 °C)-100.4 °F (38 °C)] 97.6 °F (36.4 °C)  Pulse:  [] 89  Resp:  [10-28] 20  SpO2:  [86 %-100 %] 96 %  BP:  ()/(0-77) 100/73  Arterial Line BP: ()/() 78/71       Intake/Output Summary (Last 24 hours) at 03/13/18 1445  Last data filed at 03/13/18 1300   Gross per 24 hour   Intake          1538.73 ml   Output             3930 ml   Net         -2391.27 ml       Physical Exam   Constitutional: He is oriented to person, place, and time. He appears well-developed and well-nourished. No distress.   Cardiovascular: Normal rate.    Mechanical hum   Pulmonary/Chest: Effort normal. No respiratory distress.   Chest tube with serosang output   Abdominal: Soft. He exhibits no distension.   Neurological: He is alert and oriented to person, place, and time.   Skin: Skin is warm and dry.   Psychiatric: He has a normal mood and affect. His behavior is normal.       Significant Labs:  BMP:   Recent Labs  Lab 03/13/18 0253 03/13/18  1322   *  --   --      --   --    K 4.5  < > 3.4*   CL 97  --   --    CO2 30*  --   --    BUN 39*  --   --    CREATININE 1.9*  --   --    CALCIUM 9.5  --   --    MG  --   < > 2.1   < > = values in this interval not displayed.  CBC:   Recent Labs  Lab 03/13/18 0253   WBC 13.49*   RBC 2.61*   HGB 7.2*   HCT 22.5*      MCV 86   MCH 27.6   MCHC 32.0     Coagulation:   Recent Labs  Lab 03/13/18 0253 03/13/18  1322   INR 1.0  --    APTT <21.0 27.4  27.4       Significant Diagnostics:  CXR: I have reviewed all pertinent results/findings within the past 24 hours    Procedure: Device Interrogation Including analysis of device parameters  Current Settings: Ventricular Assist Device  Review of device function is stable  TXP LVAD INTERROGATIONS 3/13/2018 3/13/2018 3/13/2018 3/13/2018 3/13/2018 3/13/2018 3/13/2018   Type HeartMate II HeartMate II HeartMate II HeartMate II HeartMate II HeartMate II HeartMate II   Flow 5.7 5.1 5.4 5.1 5.3 5.2 5.3   Speed 9000 9000 9000 9000 9000 9000 9000   PI 5.4 4.7 5.9 6.7 6.6 6.6 6.2   Power ([place]) 5.8 5.7 5.5 5.2 5.3 5.3 5.3   [facility] 8600 8600 8600  8600 8600 8600 8600   Pulsatility Pulse Pulse Pulse Pulse Pulse Pulse Pulse     Assessment/Plan:     * Acute decompensated heart failure    S/p LVAD         LVAD (left ventricular assist device) present    Neuro:   - Pain mgmt: oxycodone PRN     Pulmonary:   - Wean supplemental oxygen   - Frequent IS  - Daily chest xray, pneumo stable     Cardiac:   - Drips: dobutamine 4, epi 0.03  - Wean epi 0.01 q12h  - No aspirin- Prevent II trial   - PO amiodarone     Renal:    - -200/hr  - BUN/Cr down to 1.9  - Lasix gtt at 10  - Discontinue sun     Hepatic:  LFTs MWF     ID:   - Cultures NGTD     Hem/Onc:   - Monitor hgb; stable  - Heparin gtt, PTT goal 40-50     Endocrine:    - Insulin gtt  - Endocrine following      Fluids/Electrolytes/Nutrition/GI:   - Replace electrolytes PRN per protocol  - Clear liquids until return of bowel function, then advance    DISPO:  - Continue SICU care              Vidhi Nicolas MD  Cardiothoracic Surgery  [facility]

## 2018-03-13 NOTE — PROGRESS NOTES
Met with patient and wife today. Wife working with RN on watching dressing change as well as writing in binder. Patient will begin on workbook soon. Voiced some concerns about not being able to sleep. Explained that will get better with time and also when he moves out of the ICU.  All question answered with verbalization of understanding. Will continue to monitor patients education status.

## 2018-03-13 NOTE — PLAN OF CARE
Problem: Occupational Therapy Goal  Goal: Occupational Therapy Goal  Goals to be met by: 3/26/18     Patient will increase functional independence with ADLs by performing:    Feeding with [place].  UE Dressing with Supervision.  LE Dressing with Supervision.  Grooming while standing at sink with Supervision.  Toileting from toilet with Supervision for hygiene and clothing management.   Toilet transfer to toilet with Supervision.  Pt will be (I) with VAD management during ADL.     Outcome: Ongoing (interventions implemented as appropriate)  The above goals remain appropriate. CLIFF Garcia  3/13/2018

## 2018-03-13 NOTE — PROCEDURES
"Tim Richards is a 52 y.o. male patient.    Temp: 97.6 °F (36.4 °C) (03/13/18 1100)  Pulse: 91 (03/13/18 1500)  Resp: (!) 25 (03/13/18 1500)  BP: 97/71 (03/13/18 1430)  SpO2: (!) 90 % (03/13/18 1500)  Weight: 124.6 kg (274 lb 11.1 oz) (03/12/18 1721)  Height: 6' 1" (185.4 cm) (03/07/18 0701)    PICC  Date/Time: 3/13/2018 3:20 PM  Performed by: KARINA MEAD  Consent Done: Yes  Time out: Immediately prior to procedure a time out was called to verify the correct patient, procedure, equipment, support staff and site/side marked as required  Indications: med administration and vascular access  Anesthesia: local infiltration  Local anesthetic: lidocaine 1% without epinephrine  Anesthetic Total (mL): 3  Preparation: skin prepped with ChloraPrep  Skin prep agent dried: skin prep agent completely dried prior to procedure  Sterile barriers: all five maximum sterile barriers used - cap, mask, sterile gown, sterile gloves, and large sterile sheet  Hand hygiene: hand hygiene performed prior to central venous catheter insertion  Location details: right basilic  Catheter type: double lumen  Catheter size: 5 Fr  Catheter Length: 43cm    Ultrasound guidance: yes  Vessel Caliber: medium and patent, compressibility normal  Needle advanced into vessel with real time Ultrasound guidance.  Guidewire confirmed in vessel.  Image recorded and saved.  Sterile sheath used.  Number of attempts: 1  Post-procedure: blood return through all ports  Specimens: No  Implants: No  Assessment: placement verified by x-ray  Complications: none        Eliseo Holt  3/13/2018  "

## 2018-03-13 NOTE — PLAN OF CARE
Problem: Patient Care Overview  Goal: Plan of Care Review  Outcome: Ongoing (interventions implemented as appropriate)  Dx: POD 5 s/p LVAD placement (HM2)     Shift Events: Up in chair x5 hours, PICC line placed, 1 chest tube removed     Neuro: AAO x4, follows commands, moves all extremities     Vital Signs: MAP goal 60-80. CVP 9 while flat. Sats 97%, pt not requiring supplemental O2.     Intake: Pt tolerated clear liquid diet. Gtts: Epi, Dobutamine, Lasix, Heparin     Output: Chest tube to wall suction, total output 5-25 cc/4hr     VAD: HM 2 - speed 9000 (lsl 8600), flows > 5, PI > 5-5.5, Beasley > 5.3-5.6     Labs: Accuchecks q6hr, pt not requiring SSI throughout shift. APTT q6hr, heparin gtt titrated for goal PTT of 50-60. Mag & Potassium drawn q6hr, lytes replaced as ordered.     Skin: CDI - heels, elbows, and sacrum w/o redness or breakdown. Sternotomy covered with foam dsg - CDI. Chest tube insertion site CDI - gauze dsg changed. VAD dsg changed per hospital policy - driveline site CDI. Wife and step-son present for change.

## 2018-03-13 NOTE — PROGRESS NOTES
[facility]  Critical Care - Surgery  Progress Note    Patient Name: Tim Richards  MRN: [id number]  Admission Date: 3/1/2018  Hospital Length of Stay: 12 days  Code Status: Full Code  Attending Provider: Yg Kaufman MD  Primary Care Provider: Ja Méndez MD   Principal Problem: Acute decompensated heart failure    Subjective:     Hospital/ICU Course:  3/8 - LVAD placement  3/9 - revision of outflow graft, chest remains open  3/10 - chest closure  3/11 - pt extubated, PTX noted  3/12 - 1U PRBC transfused for hgb 6.7. Pt went to IR for chest tube for PTX, canceled due to improving PTX.    Interval History/Significant Events: 1U PRBC transfused yesterday for Hgb 6.7.  Chest tube not placed due IR seeing improved PTX.  CXR today appears improved in size compared to yesterday.    Follow-up For: Procedure(s) (LRB):  CLOSURE-STERNAL WOUND (N/A)  PLACEMENT-NEOPERICARDIUM    Post-Operative Day: 3 Days Post-Op    Objective:     Vital Signs (Most Recent):  Temp: 98.5 °F (36.9 °C) (03/13/18 0300)  Pulse: 86 (03/13/18 0415)  Resp: 20 (03/13/18 0415)  BP: (!) 92/0 (03/13/18 0415)  SpO2: 96 % (03/13/18 0415) Vital Signs (24h Range):  Temp:  [98.5 °F (36.9 °C)-100.4 °F (38 °C)] 98.5 °F (36.9 °C)  Pulse:  [] 86  Resp:  [11-31] 20  SpO2:  [93 %-100 %] 96 %  BP: (82-92)/(0-70) 92/0  Arterial Line BP: ()/(57-85) 87/70     Weight: 124.6 kg (274 lb 11.1 oz)  Body mass index is 36.24 kg/m².      Intake/Output Summary (Last 24 hours) at 03/13/18 0629  Last data filed at 03/13/18 0500   Gross per 24 hour   Intake          2162.18 ml   Output             4140 ml   Net         -1977.82 ml       Physical Exam   Constitutional: He is oriented to person, place, and time. He appears well-developed and well-nourished.   HENT:   Head: Normocephalic and atraumatic.   Eyes: EOM are normal. Pupils are equal, round, and reactive to light.   Neck: Neck supple.   Cardiovascular:   Mechanical hum    Pulmonary/Chest: Breath sounds normal. No respiratory distress. He has no wheezes.   Mediastinal chest tubes x2 in place. SS output   Abdominal: Soft. He exhibits no distension. There is no tenderness.   Genitourinary:   Genitourinary Comments: Lagunas in place   Neurological: He is alert and oriented to person, place, and time.   Skin: Skin is warm and dry.       Vents:  Vent Mode: SIMV (03/11/18 0740)  Ventilator Initiated: Yes (03/08/18 1400)  Set Rate: 18 bmp (03/11/18 0740)  Vt Set: 550 mL (03/11/18 0740)  Pressure Support: 10 cmH20 (03/11/18 0740)  PEEP/CPAP: 5 cmH20 (03/11/18 0740)  Oxygen Concentration (%): 50 (03/11/18 0930)  Peak Airway Pressure: 26 cmH2O (03/11/18 0740)  Plateau Pressure: 0 cmH20 (03/11/18 0740)  Total Ve: 10.6 mL (03/11/18 0740)  F/VT Ratio<105 (RSBI): (!) 36.52 (03/11/18 0523)    Lines/Drains/Airways     Central Venous Catheter Line                 Introducer 03/08/18 0800 4 days         Percutaneous Central Line Insertion/Assessment - double lumen  03/08/18 0700 right internal jugular 4 days          Drain                 Chest Tube 03/08/18 1125 1 Right Mediastinal 32 Fr. 4 days         Chest Tube 03/08/18 1125 2 Left Mediastinal 32 Fr. 4 days         Urethral Catheter 03/08/18 0736 Temperature probe;Straight-tip;Non-latex 16 Fr. 4 days          Arterial Line                 Arterial Line 03/08/18 0721 Left Radial 4 days          Line                 VAD 03/08/18 1124 Left ventricular assist device HeartMate II 4 days          Peripheral Intravenous Line                 Midline Catheter Insertion/Assessment  - Single Lumen 03/07/18 1130 Right cephalic vein (lateral side of arm) 18g x 8cm 5 days         Peripheral IV - Single Lumen 03/08/18 0739 Left Forearm 4 days                Significant Labs:    CBC/Anemia Profile:    Recent Labs  Lab 03/12/18  0506 03/12/18  1919 03/13/18  0253   WBC 13.55* 13.53* 13.49*   HGB 6.7* 7.4* 7.2*   HCT 20.5* 23.1* 22.5*   * 151 159   MCV 82 87  86   RDW 15.4* 15.6* 15.5*        Chemistries:    Recent Labs  Lab 03/11/18  0733  03/12/18  0506  03/12/18  1253 03/12/18  1918 03/13/18  0011 03/13/18  0253     --  139  --   --   --   --  136   K 3.4*  < > 3.9  3.9  --  3.7 3.7 3.2* 4.5   CL 99  --  100  --   --   --   --  97   CO2 26  --  27  --   --   --   --  30*   BUN 39*  --  42*  --   --   --   --  39*   CREATININE 2.7*  --  2.2*  --   --   --   --  1.9*   CALCIUM 9.4  --  9.4  --   --   --   --  9.5   ALBUMIN  --   --  2.7*  --   --   --   --   --    PROT  --   --  6.3  --   --   --   --   --    BILITOT  --   --  3.3*  --   --   --   --   --    ALKPHOS  --   --  79  --   --   --   --   --    ALT  --   --  21  --   --   --   --   --    AST  --   --  55*  --   --   --   --   --    MG 2.3  < > 2.3  < > 2.2 2.5 2.2  --    PHOS 4.3  --  3.2  --   --   --   --  2.7   < > = values in this interval not displayed.    All pertinent labs within the past 24 hours have been reviewed.    Significant Imaging:  I have reviewed and interpreted all pertinent imaging results/findings within the past 24 hours.    Assessment/Plan:     Chronic systolic heart failure    Neuro:   -off sedation  -pain controlled with oxycodone     Pulmonary:   - extubated 3/11  - Scheduled duo-nebs  - Nitric. Wean per CTS  - R side PTX first noted after extubation 3/11, CXR 3/12 showed increased size, went to IR, IR saw smaller PTX, chest tube canceled, 3/13 PTX smaller in size on CXR.      Cardiac:  - s/p LVAD 3/8/18, outflow tract revision on 3/9, and chest closure 3/10  - Pressors: Epi, dobutamine  - Wean pressors per CTS  - Amiodarone drip for arrhythmias     Renal:   - CKD; baseline Cr. 1.7  - WAGNER on 3/8 and 3/11, improving  - Lagunas in place  - Lasix drip  - Monitor UOP     Infectious Disease:   -Leukocytosis on 3/8 after initial surgery, improved, stable at 14, trend WBC  -Low grade fevers on 3/8 after initial surgery, torsten to >38C on 3/10  -Blood cultures 3/6 NG, 3/11  NGTD  -vancomycin and doxycycline on 3/8  -cefazolin for surgery 3/9 and 3/10  -fluconazole, cefepime, bacitracin IM started 3/8, continuing     Hematology/Oncology:  - Trend CBC, H/H slowly trending down, transfuse per CTS  - iron supplmentation  - On heparin drip, started warfarin 3/12     Endocrine:  - Insuline gtt  - Endocrine on board     Fluids/Electrolytes/Nutrition/GI:   - tolerating PO intake, advance as tolerated  - Trend CMP  - Replace Lytes PRN      Dispo:  Continue SICU care. CTS primary.            Critical care was time spent personally by me on the following activities: development of treatment plan with patient or surrogate and bedside caregivers, discussions with consultants, evaluation of patient's response to treatment, examination of patient, ordering and performing treatments and interventions, ordering and review of laboratory studies, ordering and review of radiographic studies, pulse oximetry, re-evaluation of patient's condition.  This critical care time did not overlap with that of any other provider or involve time for any procedures.     Fam Patel MD  Critical Care - Surgery  [facility]

## 2018-03-13 NOTE — CONSULTS
Triple lumen PICC place to right basilic vein.  43 cm in length, 0cm exposed. Initial arm circumference 37cm. Lot # [id number].

## 2018-03-13 NOTE — SUBJECTIVE & OBJECTIVE
Interval History/Significant Events: 1U PRBC transfused yesterday for Hgb 6.7.  Chest tube not placed due IR seeing improved PTX.  CXR today appears improved in size compared to yesterday.    Follow-up For: Procedure(s) (LRB):  CLOSURE-STERNAL WOUND (N/A)  PLACEMENT-NEOPERICARDIUM    Post-Operative Day: 3 Days Post-Op    Objective:     Vital Signs (Most Recent):  Temp: 98.5 °F (36.9 °C) (03/13/18 0300)  Pulse: 86 (03/13/18 0415)  Resp: 20 (03/13/18 0415)  BP: (!) 92/0 (03/13/18 0415)  SpO2: 96 % (03/13/18 0415) Vital Signs (24h Range):  Temp:  [98.5 °F (36.9 °C)-100.4 °F (38 °C)] 98.5 °F (36.9 °C)  Pulse:  [] 86  Resp:  [11-31] 20  SpO2:  [93 %-100 %] 96 %  BP: (82-92)/(0-70) 92/0  Arterial Line BP: ()/(57-85) 87/70     Weight: 124.6 kg (274 lb 11.1 oz)  Body mass index is 36.24 kg/m².      Intake/Output Summary (Last 24 hours) at 03/13/18 0629  Last data filed at 03/13/18 0500   Gross per 24 hour   Intake          2162.18 ml   Output             4140 ml   Net         -1977.82 ml       Physical Exam   Constitutional: He is oriented to person, place, and time. He appears well-developed and well-nourished.   HENT:   Head: Normocephalic and atraumatic.   Eyes: EOM are normal. Pupils are equal, round, and reactive to light.   Neck: Neck supple.   Cardiovascular:   Mechanical hum   Pulmonary/Chest: Breath sounds normal. No respiratory distress. He has no wheezes.   Mediastinal chest tubes x2 in place. SS output   Abdominal: Soft. He exhibits no distension. There is no tenderness.   Genitourinary:   Genitourinary Comments: Lagunas in place   Neurological: He is alert and oriented to person, place, and time.   Skin: Skin is warm and dry.       Vents:  Vent Mode: SIMV (03/11/18 0740)  Ventilator Initiated: Yes (03/08/18 1400)  Set Rate: 18 bmp (03/11/18 0740)  Vt Set: 550 mL (03/11/18 0740)  Pressure Support: 10 cmH20 (03/11/18 0740)  PEEP/CPAP: 5 cmH20 (03/11/18 0740)  Oxygen Concentration (%): 50 (03/11/18  0930)  Peak Airway Pressure: 26 cmH2O (03/11/18 0740)  Plateau Pressure: 0 cmH20 (03/11/18 0740)  Total Ve: 10.6 mL (03/11/18 0740)  F/VT Ratio<105 (RSBI): (!) 36.52 (03/11/18 0523)    Lines/Drains/Airways     Central Venous Catheter Line                 Introducer 03/08/18 0800 4 days         Percutaneous Central Line Insertion/Assessment - double lumen  03/08/18 0700 right internal jugular 4 days          Drain                 Chest Tube 03/08/18 1125 1 Right Mediastinal 32 Fr. 4 days         Chest Tube 03/08/18 1125 2 Left Mediastinal 32 Fr. 4 days         Urethral Catheter 03/08/18 0736 Temperature probe;Straight-tip;Non-latex 16 Fr. 4 days          Arterial Line                 Arterial Line 03/08/18 0721 Left Radial 4 days          Line                 VAD 03/08/18 1124 Left ventricular assist device HeartMate II 4 days          Peripheral Intravenous Line                 Midline Catheter Insertion/Assessment  - Single Lumen 03/07/18 1130 Right cephalic vein (lateral side of arm) 18g x 8cm 5 days         Peripheral IV - Single Lumen 03/08/18 0739 Left Forearm 4 days                Significant Labs:    CBC/Anemia Profile:    Recent Labs  Lab 03/12/18  0506 03/12/18 1919 03/13/18  0253   WBC 13.55* 13.53* 13.49*   HGB 6.7* 7.4* 7.2*   HCT 20.5* 23.1* 22.5*   * 151 159   MCV 82 87 86   RDW 15.4* 15.6* 15.5*        Chemistries:    Recent Labs  Lab 03/11/18  0733  03/12/18  0506  03/12/18  1253 03/12/18 1918 03/13/18  0011 03/13/18  0253     --  139  --   --   --   --  136   K 3.4*  < > 3.9  3.9  --  3.7 3.7 3.2* 4.5   CL 99  --  100  --   --   --   --  97   CO2 26  --  27  --   --   --   --  30*   BUN 39*  --  42*  --   --   --   --  39*   CREATININE 2.7*  --  2.2*  --   --   --   --  1.9*   CALCIUM 9.4  --  9.4  --   --   --   --  9.5   ALBUMIN  --   --  2.7*  --   --   --   --   --    PROT  --   --  6.3  --   --   --   --   --    BILITOT  --   --  3.3*  --   --   --   --   --    ALKPHOS  --   --   79  --   --   --   --   --    ALT  --   --  21  --   --   --   --   --    AST  --   --  55*  --   --   --   --   --    MG 2.3  < > 2.3  < > 2.2 2.5 2.2  --    PHOS 4.3  --  3.2  --   --   --   --  2.7   < > = values in this interval not displayed.    All pertinent labs within the past 24 hours have been reviewed.    Significant Imaging:  I have reviewed and interpreted all pertinent imaging results/findings within the past 24 hours.

## 2018-03-13 NOTE — PROGRESS NOTES
[facility]  Heart Transplant  Progress Note    Patient Name: Tim Richards  MRN: [id number]  Admission Date: 3/1/2018  Hospital Length of Stay: 12 days  Attending Physician: Yg Kaufman MD  Primary Care Provider: Ja Méndez MD  Principal Problem:Acute decompensated heart failure    Subjective:     Interval History: IR unable to place chest tube yesterday.  CXR looks stable.  Weaning Epi.  Was given 1 unit PRBC's yesterday; NO weaned off and Amiodarone infusion stopped.  He reports passing a lot of gas but no Bowel movements despite receiving Miralax.  He has some SOB but other wise no complaints.     Continuous Infusions:   DOBUTamine 4 mcg/kg/min (03/13/18 1000)    epinephrine infusion 0.03 mcg/kg/min (03/13/18 1000)    furosemide (LASIX) 1 mg/mL infusion (non-titrating) 10 mg/hr (03/13/18 1000)    heparin (porcine) in 5 % dex 1,400 Units/hr (03/13/18 1000)     Scheduled Meds:   amiodarone  400 mg Oral BID    docusate sodium  200 mg Oral QHS    ferrous gluconate  324 mg Oral Daily with breakfast    INV aspirin/placebo  81 mg Oral Daily    pantoprazole  40 mg Oral Daily    polyethylene glycol  17 g Oral Daily    potassium chloride SA  40 mEq Oral BID    sodium chloride 0.9%  3 mL Intravenous Q8H    warfarin  4 mg Oral Daily     PRN Meds:acetaminophen, albumin human 5%, albuterol sulfate, bisacodyl, dextrose 50%, dextrose 50%, glucagon (human recombinant), glucose, glucose, insulin aspart U-100, magnesium hydroxide 400 mg/5 ml, oxyCODONE, oxyCODONE    Review of patient's allergies indicates:   Allergen Reactions    Lisinopril Anaphylaxis     Objective:     Vital Signs (Most Recent):  Temp: 96.7 °F (35.9 °C) (03/13/18 0700)  Pulse: 83 (03/13/18 1030)  Resp: 20 (03/13/18 1030)  BP: (!) 92/0 (03/13/18 0415)  SpO2: 100 % (03/13/18 1030) Vital Signs (24h Range):  Temp:  [96.7 °F (35.9 °C)-100.4 °F (38 °C)] 96.7 °F (35.9 °C)  Pulse:  [] 83  Resp:  [11-27] 20  SpO2:   [86 %-100 %] 100 %  BP: (84-92)/(0-70) 92/0  Arterial Line BP: ()/() 88/84     Patient Vitals for the past 72 hrs (Last 3 readings):   Weight   03/12/18 1721 124.6 kg (274 lb 11.1 oz)   03/11/18 0500 119.7 kg (263 lb 14.3 oz)     Body mass index is 36.24 kg/m².      Intake/Output Summary (Last 24 hours) at 03/13/18 1106  Last data filed at 03/13/18 1000   Gross per 24 hour   Intake          1988.73 ml   Output             3855 ml   Net         -1866.27 ml     Hemodynamic Parameters:    Physical Exam   Constitutional: He appears well-developed and well-nourished. No distress.   HENT:   Head: Normocephalic and atraumatic.   Neck: Normal range of motion. No JVD present.   Cardiovascular: Normal rate.  Exam reveals no friction rub.    No murmur heard.  Normal VAD hum   Pulmonary/Chest: Effort normal. No respiratory distress. He has no wheezes. He has rales.   Faint left lower lobe   Abdominal: Soft. He exhibits no distension.   Musculoskeletal: Normal range of motion. He exhibits no edema.   Neurological: He is alert.   Skin: Skin is warm. Capillary refill takes 2 to 3 seconds. He is not diaphoretic. No erythema.     Significant Labs:  CBC:    Recent Labs  Lab 03/12/18  0506 03/12/18  1919 03/13/18  0253   WBC 13.55* 13.53* 13.49*   RBC 2.49* 2.67* 2.61*   HGB 6.7* 7.4* 7.2*   HCT 20.5* 23.1* 22.5*   * 151 159   MCV 82 87 86   MCH 26.9* 27.7 27.6   MCHC 32.7 32.0 32.0     BNP:    Recent Labs  Lab 03/07/18  0258 03/09/18  0308 03/12/18  0506   BNP 1,110* 1,114* 1,419*     CMP:    Recent Labs  Lab 03/10/18  0400  03/11/18  0422 03/11/18  0733  03/12/18  0506  03/13/18  0011 03/13/18  0253 03/13/18  0604   *  < > 145*  145*  145* 137*  --  131*  --   --  124*  --    CALCIUM 9.3  < > 9.5  9.5  9.5 9.4  --  9.4  --   --  9.5  --    ALBUMIN 3.1*  --  3.0*  --   --  2.7*  --   --   --   --    PROT 6.1  --  6.4  --   --  6.3  --   --   --   --      < > 138  138  138 138  --  139  --   --   136  --    K 3.8  < > 3.6  3.6  3.6 3.4*  < > 3.9  3.9  < > 3.2* 4.5 3.6   CO2 27  < > 27  27  27 26  --  27  --   --  30*  --    CL 98  < > 99  99  99 99  --  100  --   --  97  --    BUN 25*  < > 37*  37*  37* 39*  --  42*  --   --  39*  --    CREATININE 2.2*  < > 2.6*  2.6*  2.6* 2.7*  --  2.2*  --   --  1.9*  --    ALKPHOS 60  --  63  --   --  79  --   --   --   --    ALT 21  --  16  --   --  21  --   --   --   --    AST 56*  --  44*  --   --  55*  --   --   --   --    BILITOT 2.0*  --  2.4*  --   --  3.3*  --   --   --   --    < > = values in this interval not displayed.   Coagulation:     Recent Labs  Lab 03/11/18  0733  03/12/18  0506 03/12/18  1253 03/12/18  1918 03/13/18  0253   INR 0.9  --  1.0  --   --  1.0   APTT 27.1  < > 25.0 28.2 <21.0 <21.0   < > = values in this interval not displayed.  LDH:    Recent Labs  Lab 03/11/18  0422 03/12/18  0506 03/13/18  0253   * 373* 393*     Microbiology:  Microbiology Results (last 7 days)     Procedure Component Value Units Date/Time    Blood culture [[id number]] Collected:  03/11/18 0650    Order Status:  Completed Specimen:  Blood from Peripheral, Hand, Right Updated:  03/13/18 1012     Blood Culture, Routine No Growth to date     Blood Culture, Routine No Growth to date     Blood Culture, Routine No Growth to date    Blood culture [[id number]] Collected:  03/11/18 0651    Order Status:  Completed Specimen:  Blood from Peripheral, Wrist, Right Updated:  03/13/18 1012     Blood Culture, Routine No Growth to date     Blood Culture, Routine No Growth to date     Blood Culture, Routine No Growth to date    Culture, Respiratory with Gram Stain [[id number]] Collected:  03/11/18 0800    Order Status:  Completed Specimen:  Respiratory from Endotracheal Aspirate Updated:  03/13/18 0856     Respiratory Culture Further report to follow     Gram Stain (Respiratory) <10 epithelial cells per low power field.     Gram Stain (Respiratory) Few WBC's     Gram Stain  (Respiratory) Rare Gram positive cocci     Gram Stain (Respiratory) Rare Gram positive rods    Blood culture [[id number]] Collected:  03/06/18 1107    Order Status:  Completed Specimen:  Blood Updated:  03/11/18 1612     Blood Culture, Routine No growth after 5 days.    Blood culture [[id number]] Collected:  03/06/18 1107    Order Status:  Completed Specimen:  Blood Updated:  03/11/18 1612     Blood Culture, Routine No growth after 5 days.    IV catheter culture [[id number]] Collected:  03/07/18 1151    Order Status:  Completed Specimen:  Catheter Tip from Catheter Tip, Intrajugular Updated:  03/10/18 0659     Aerobic Culture - Cath tip No growth    Blood culture [[id number]]     Order Status:  Canceled Specimen:  Blood     Blood culture [[id number]]     Order Status:  Canceled Specimen:  Blood           I have reviewed all pertinent labs within the past 24 hours.    Estimated Creatinine Clearance: 63.6 mL/min (A) (based on SCr of 1.9 mg/dL (H)).    Diagnostic Results:  I have reviewed and interpreted all pertinent imaging results/findings within the past 24 hours.    Assessment and Plan:     Mr. Richards is a 52 y.o. year old Black or  male who has presents as f/u from hospitalization for ADHF after presenting to clinic 1/10/17 as initial consult. advanced surgical options (LVAD/OHT).    This 53 yo BM has had CHF since 2011 at which time an angiogram did not demonstrate any CAD.  He is on amiodarone for VT. He was admitted from 1/12-1/17/18 (see d/c summary) where he was treated for ADHF and initiation of w/u for advanced options. RHC during that admission showed RA 17 and PCWP 35 as well as severely reduced CI 1.6. Though not optimized, he was discharged per his request so as not to lose his job/insurance--see Dr. Hadley's attestation for full details on d/c summary. Since d/c, his Scr has risen from 2.0 on discharge to 2.8 (1/23/18). His BNP had declined to 1040.  He presents today for scheduled  hospital f/u.      Today, he reports feeling well. He says he has more energy than usual. His only complaints are cramping and xerosis. He denies n/v/d, no CP, palpitations or syncope. He has stable orthostatic dizziness/LH. No PND or orthopnea. His COLEMAN is improved from discharge and his weight is down about 5-7 pounds on his home scale. Of note, his Scr on labs from 2 days ago showed Scr 2.8 up from 2.0. T. Bili was down from 1.1 to 0.6 and BNP was down to 1040 from 1700.  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 No labs initially ordered but I ordered and sent him down.     LVAD (left ventricular assist device) present    -HeartMate II Implanted 3/8/18 as DT with repositioning of outflow graft 3/9/18.  S/p chest closure 3/10/18  -CTS Primary  -Implanted by Dr. Kaufman  -Anticoagulation per Primary Coumadin  -Antiplatelets; Patient is enrolled in PREVENT II  -Speed set at 9000, LSL 8600 rpm  -Interrogation notable for no events  -Not listed for OHTx  - On Epi, and ; wean as tolerated.  NO off            Acute on chronic combined systolic and diastolic heart failure    -NICM   -EF: <10%; LVEDD: 9.7 cm  -now s/p HM II LVAD  -diuresing with Lasix drip: net negative 2.2 L over last 24 hours and CVP is 8            CKD (chronic kidney disease), stage III    - cr 1.9 today  -Baseline creat 1.6-2.0.        PVT (paroxysmal ventricular tachycardia)    -has ICD  -Transitioned from IV Amiodarone infusion to po         Hyperbilirubinemia    -likely secondary to above  -however, up to 3.3 yesterday from 2.4   -given low grade temperature over the weekend; and has not had bowel movement in over a week; may consider RUQ ultrasound        Biventricular implantable cardioverter-defibrillator in situ    - in place  - ICD interrogated, event noted on 2/25/18 with appropriate shock for VT  -Continue Amiodarone po        Anemia    Transfused a unit PRBC's 3/12        Pneumothorax    -IR unable to place chest  tube or pig tail; monitor daily CXR            YANET Coleman  Heart Transplant  [facility]

## 2018-03-14 LAB
ABO + RH BLD: NORMAL
ALBUMIN SERPL BCP-MCNC: 2.6 G/DL
ALP SERPL-CCNC: 120 U/L
ALT SERPL W/O P-5'-P-CCNC: 29 U/L
ANION GAP SERPL CALC-SCNC: 10 MMOL/L
APTT BLDCRRT: 28.2 SEC
APTT BLDCRRT: 28.4 SEC
APTT BLDCRRT: 30.6 SEC
APTT BLDCRRT: 43.1 SEC
APTT BLDCRRT: 59.3 SEC
AST SERPL-CCNC: 53 U/L
BASOPHILS # BLD AUTO: 0.02 K/UL
BASOPHILS NFR BLD: 0.1 %
BILIRUB DIRECT SERPL-MCNC: 3 MG/DL
BILIRUB SERPL-MCNC: 3.7 MG/DL
BLD GP AB SCN CELLS X3 SERPL QL: NORMAL
BNP SERPL-MCNC: 1324 PG/ML
BUN SERPL-MCNC: 37 MG/DL
CALCIUM SERPL-MCNC: 9.7 MG/DL
CHLORIDE SERPL-SCNC: 96 MMOL/L
CO2 SERPL-SCNC: 31 MMOL/L
CREAT SERPL-MCNC: 1.8 MG/DL
CRP SERPL-MCNC: 277 MG/L
DIASTOLIC DYSFUNCTION: YES
DIFFERENTIAL METHOD: ABNORMAL
EOSINOPHIL # BLD AUTO: 0.2 K/UL
EOSINOPHIL NFR BLD: 1.4 %
ERYTHROCYTE [DISTWIDTH] IN BLOOD BY AUTOMATED COUNT: 15.6 %
EST. GFR  (AFRICAN AMERICAN): 49.3 ML/MIN/1.73 M^2
EST. GFR  (NON AFRICAN AMERICAN): 42.6 ML/MIN/1.73 M^2
ESTIMATED PA SYSTOLIC PRESSURE: 18.66
FACT X PPP CHRO-ACNC: 0.12 IU/ML
FACT X PPP CHRO-ACNC: 0.14 IU/ML
FACT X PPP CHRO-ACNC: 0.44 IU/ML
FACT X PPP CHRO-ACNC: 0.78 IU/ML
GLUCOSE SERPL-MCNC: 104 MG/DL
HCT VFR BLD AUTO: 21.8 %
HGB BLD-MCNC: 7 G/DL
IMM GRANULOCYTES # BLD AUTO: 0.53 K/UL
IMM GRANULOCYTES NFR BLD AUTO: 3.5 %
INR PPP: 0.9
LDH SERPL L TO P-CCNC: 411 U/L
LYMPHOCYTES # BLD AUTO: 1.3 K/UL
LYMPHOCYTES NFR BLD: 8.6 %
MAGNESIUM SERPL-MCNC: 1.9 MG/DL
MAGNESIUM SERPL-MCNC: 2.1 MG/DL
MAGNESIUM SERPL-MCNC: 2.1 MG/DL
MAGNESIUM SERPL-MCNC: 2.2 MG/DL
MCH RBC QN AUTO: 26.8 PG
MCHC RBC AUTO-ENTMCNC: 32.1 G/DL
MCV RBC AUTO: 84 FL
MITRAL VALVE REGURGITATION: ABNORMAL
MONOCYTES # BLD AUTO: 1.8 K/UL
MONOCYTES NFR BLD: 11.9 %
NEUTROPHILS # BLD AUTO: 11.4 K/UL
NEUTROPHILS NFR BLD: 74.5 %
NRBC BLD-RTO: 1 /100 WBC
PHOSPHATE SERPL-MCNC: 2.2 MG/DL
PLATELET # BLD AUTO: 219 K/UL
PMV BLD AUTO: 11 FL
POCT GLUCOSE: 110 MG/DL (ref 70–110)
POTASSIUM SERPL-SCNC: 3.6 MMOL/L
POTASSIUM SERPL-SCNC: 3.8 MMOL/L
POTASSIUM SERPL-SCNC: 4.2 MMOL/L
POTASSIUM SERPL-SCNC: 4.4 MMOL/L
PROT SERPL-MCNC: 6.5 G/DL
PROTHROMBIN TIME: 9.9 SEC
RBC # BLD AUTO: 2.61 M/UL
RETIRED EF AND QEF - SEE NOTES: 8 (ref 55–65)
SODIUM SERPL-SCNC: 137 MMOL/L
T4 FREE SERPL-MCNC: 1.09 NG/DL
T4 SERPL-MCNC: 6.5 UG/DL
TRICUSPID VALVE REGURGITATION: ABNORMAL
TSH SERPL DL<=0.005 MIU/L-ACNC: 4.91 UIU/ML
WBC # BLD AUTO: 15.32 K/UL

## 2018-03-14 PROCEDURE — 93306 TTE W/DOPPLER COMPLETE: CPT | Mod: 26,,, | Performed by: INTERNAL MEDICINE

## 2018-03-14 PROCEDURE — 85610 PROTHROMBIN TIME: CPT

## 2018-03-14 PROCEDURE — 86140 C-REACTIVE PROTEIN: CPT

## 2018-03-14 PROCEDURE — 63600175 PHARM REV CODE 636 W HCPCS: Performed by: THORACIC SURGERY (CARDIOTHORACIC VASCULAR SURGERY)

## 2018-03-14 PROCEDURE — 83615 LACTATE (LD) (LDH) ENZYME: CPT

## 2018-03-14 PROCEDURE — 99232 SBSQ HOSP IP/OBS MODERATE 35: CPT | Mod: ,,, | Performed by: INTERNAL MEDICINE

## 2018-03-14 PROCEDURE — 83735 ASSAY OF MAGNESIUM: CPT

## 2018-03-14 PROCEDURE — 85025 COMPLETE CBC W/AUTO DIFF WBC: CPT

## 2018-03-14 PROCEDURE — 84436 ASSAY OF TOTAL THYROXINE: CPT

## 2018-03-14 PROCEDURE — 84132 ASSAY OF SERUM POTASSIUM: CPT

## 2018-03-14 PROCEDURE — 85520 HEPARIN ASSAY: CPT | Mod: 91

## 2018-03-14 PROCEDURE — 99233 SBSQ HOSP IP/OBS HIGH 50: CPT | Mod: ,,, | Performed by: SURGERY

## 2018-03-14 PROCEDURE — A4216 STERILE WATER/SALINE, 10 ML: HCPCS | Performed by: THORACIC SURGERY (CARDIOTHORACIC VASCULAR SURGERY)

## 2018-03-14 PROCEDURE — 85520 HEPARIN ASSAY: CPT

## 2018-03-14 PROCEDURE — 97530 THERAPEUTIC ACTIVITIES: CPT

## 2018-03-14 PROCEDURE — 25000003 PHARM REV CODE 250: Performed by: THORACIC SURGERY (CARDIOTHORACIC VASCULAR SURGERY)

## 2018-03-14 PROCEDURE — 80076 HEPATIC FUNCTION PANEL: CPT

## 2018-03-14 PROCEDURE — 86850 RBC ANTIBODY SCREEN: CPT

## 2018-03-14 PROCEDURE — 84439 ASSAY OF FREE THYROXINE: CPT

## 2018-03-14 PROCEDURE — 97535 SELF CARE MNGMENT TRAINING: CPT

## 2018-03-14 PROCEDURE — 83880 ASSAY OF NATRIURETIC PEPTIDE: CPT

## 2018-03-14 PROCEDURE — 20000000 HC ICU ROOM

## 2018-03-14 PROCEDURE — 93750 INTERROGATION VAD IN PERSON: CPT | Mod: ,,, | Performed by: INTERNAL MEDICINE

## 2018-03-14 PROCEDURE — 25000003 PHARM REV CODE 250: Performed by: STUDENT IN AN ORGANIZED HEALTH CARE EDUCATION/TRAINING PROGRAM

## 2018-03-14 PROCEDURE — 85730 THROMBOPLASTIN TIME PARTIAL: CPT

## 2018-03-14 PROCEDURE — 97116 GAIT TRAINING THERAPY: CPT

## 2018-03-14 PROCEDURE — 63600175 PHARM REV CODE 636 W HCPCS: Performed by: STUDENT IN AN ORGANIZED HEALTH CARE EDUCATION/TRAINING PROGRAM

## 2018-03-14 PROCEDURE — 63600175 PHARM REV CODE 636 W HCPCS

## 2018-03-14 PROCEDURE — 84443 ASSAY THYROID STIM HORMONE: CPT

## 2018-03-14 PROCEDURE — 85730 THROMBOPLASTIN TIME PARTIAL: CPT | Mod: 91

## 2018-03-14 PROCEDURE — 80048 BASIC METABOLIC PNL TOTAL CA: CPT

## 2018-03-14 PROCEDURE — 27000248 HC VAD-ADDITIONAL DAY

## 2018-03-14 PROCEDURE — 84100 ASSAY OF PHOSPHORUS: CPT

## 2018-03-14 PROCEDURE — 93306 TTE W/DOPPLER COMPLETE: CPT

## 2018-03-14 PROCEDURE — 83735 ASSAY OF MAGNESIUM: CPT | Mod: 91

## 2018-03-14 RX ORDER — POTASSIUM CHLORIDE 29.8 MG/ML
40 INJECTION INTRAVENOUS ONCE
Status: COMPLETED | OUTPATIENT
Start: 2018-03-14 | End: 2018-03-14

## 2018-03-14 RX ORDER — LANOLIN ALCOHOL/MO/W.PET/CERES
400 CREAM (GRAM) TOPICAL ONCE
Status: COMPLETED | OUTPATIENT
Start: 2018-03-14 | End: 2018-03-14

## 2018-03-14 RX ORDER — POTASSIUM CHLORIDE 20 MEQ/1
40 TABLET, EXTENDED RELEASE ORAL ONCE
Status: COMPLETED | OUTPATIENT
Start: 2018-03-14 | End: 2018-03-14

## 2018-03-14 RX ADMIN — POTASSIUM CHLORIDE 40 MEQ: 1500 TABLET, EXTENDED RELEASE ORAL at 08:03

## 2018-03-14 RX ADMIN — DIBASIC SODIUM PHOSPHATE, MONOBASIC POTASSIUM PHOSPHATE AND MONOBASIC SODIUM PHOSPHATE 2 TABLET: 852; 155; 130 TABLET ORAL at 11:03

## 2018-03-14 RX ADMIN — WARFARIN SODIUM 6 MG: 1 TABLET ORAL at 04:03

## 2018-03-14 RX ADMIN — POTASSIUM CHLORIDE 40 MEQ: 400 INJECTION, SOLUTION INTRAVENOUS at 02:03

## 2018-03-14 RX ADMIN — FUROSEMIDE 10 MG/HR: 10 INJECTION, SOLUTION INTRAMUSCULAR; INTRAVENOUS at 12:03

## 2018-03-14 RX ADMIN — FUROSEMIDE: 10 INJECTION, SOLUTION INTRAMUSCULAR; INTRAVENOUS at 11:03

## 2018-03-14 RX ADMIN — DOBUTAMINE IN DEXTROSE 4 MCG/KG/MIN: 200 INJECTION, SOLUTION INTRAVENOUS at 09:03

## 2018-03-14 RX ADMIN — Medication 10 ML: at 06:03

## 2018-03-14 RX ADMIN — Medication 81 MG: at 09:03

## 2018-03-14 RX ADMIN — POTASSIUM CHLORIDE 40 MEQ: 1500 TABLET, EXTENDED RELEASE ORAL at 07:03

## 2018-03-14 RX ADMIN — POLYETHYLENE GLYCOL 3350 17 G: 17 POWDER, FOR SOLUTION ORAL at 09:03

## 2018-03-14 RX ADMIN — Medication 10 ML: at 12:03

## 2018-03-14 RX ADMIN — AMIODARONE HYDROCHLORIDE 400 MG: 200 TABLET ORAL at 08:03

## 2018-03-14 RX ADMIN — DOBUTAMINE IN DEXTROSE 4 MCG/KG/MIN: 200 INJECTION, SOLUTION INTRAVENOUS at 08:03

## 2018-03-14 RX ADMIN — Medication 3 ML: at 06:03

## 2018-03-14 RX ADMIN — POTASSIUM CHLORIDE 40 MEQ: 1500 TABLET, EXTENDED RELEASE ORAL at 09:03

## 2018-03-14 RX ADMIN — OXYCODONE HYDROCHLORIDE 10 MG: 5 TABLET ORAL at 07:03

## 2018-03-14 RX ADMIN — MAGNESIUM OXIDE TAB 400 MG (241.3 MG ELEMENTAL MG) 400 MG: 400 (241.3 MG) TAB at 07:03

## 2018-03-14 RX ADMIN — AMIODARONE HYDROCHLORIDE 400 MG: 200 TABLET ORAL at 09:03

## 2018-03-14 RX ADMIN — FERROUS GLUCONATE TAB 324 MG (37.5 MG ELEMENTAL IRON) 324 MG: 324 (37.5 FE) TAB at 07:03

## 2018-03-14 RX ADMIN — DIBASIC SODIUM PHOSPHATE, MONOBASIC POTASSIUM PHOSPHATE AND MONOBASIC SODIUM PHOSPHATE 2 TABLET: 852; 155; 130 TABLET ORAL at 04:03

## 2018-03-14 RX ADMIN — PANTOPRAZOLE SODIUM 40 MG: 40 TABLET, DELAYED RELEASE ORAL at 09:03

## 2018-03-14 RX ADMIN — Medication 10 ML: at 11:03

## 2018-03-14 RX ADMIN — HEPARIN SODIUM 2000 UNITS/HR: 10000 INJECTION, SOLUTION INTRAVENOUS at 08:03

## 2018-03-14 RX ADMIN — DIBASIC SODIUM PHOSPHATE, MONOBASIC POTASSIUM PHOSPHATE AND MONOBASIC SODIUM PHOSPHATE 2 TABLET: 852; 155; 130 TABLET ORAL at 07:03

## 2018-03-14 NOTE — SUBJECTIVE & OBJECTIVE
Subjective:     Post-Op Info:  Procedure(s) (LRB):  CLOSURE-STERNAL WOUND (N/A)  PLACEMENT-NEOPERICARDIUM   4 Days Post-Op        Interval History:   NAEON. Tbili increased to 3.7 from 3.3. Tolerating diet.    Medications:  Continuous Infusions:   DOBUTamine 4 mcg/kg/min (03/14/18 1000)    epinephrine infusion 0.01 mcg/kg/min (03/14/18 1000)    custom IV infusion builder (for pharmacist use only)      heparin (porcine) in 5 % dex 2,000 Units/hr (03/14/18 1000)     Scheduled Meds:   amiodarone  400 mg Oral BID    docusate sodium  200 mg Oral QHS    ferrous gluconate  324 mg Oral Daily with breakfast    INV aspirin/placebo  81 mg Oral Daily    k phos di & mono-sod phos mono  2 tablet Oral Q4H    pantoprazole  40 mg Oral Daily    polyethylene glycol  17 g Oral Daily    potassium chloride SA  40 mEq Oral BID    sodium chloride 0.9%  10 mL Intravenous Q6H    sodium chloride 0.9%  3 mL Intravenous Q8H    warfarin  6 mg Oral Daily     PRN Meds:acetaminophen, albumin human 5%, albuterol sulfate, bisacodyl, dextrose 50%, dextrose 50%, glucagon (human recombinant), glucose, glucose, insulin aspart U-100, magnesium hydroxide 400 mg/5 ml, oxyCODONE, oxyCODONE, Flushing PICC Protocol **AND** sodium chloride 0.9% **AND** sodium chloride 0.9%     Objective:     Vital Signs (Most Recent):  Temp: 98.5 °F (36.9 °C) (03/14/18 0700)  Pulse: 86 (03/14/18 1045)  Resp: (!) 23 (03/14/18 1045)  BP: 94/61 (03/14/18 0830)  SpO2: 99 % (03/14/18 0900) Vital Signs (24h Range):  Temp:  [97.6 °F (36.4 °C)-98.6 °F (37 °C)] 98.5 °F (36.9 °C)  Pulse:  [] 86  Resp:  [10-32] 23  SpO2:  [88 %-100 %] 99 %  BP: ()/(0-77) 94/61  Arterial Line BP: ()/() 92/69       Intake/Output Summary (Last 24 hours) at 03/14/18 1056  Last data filed at 03/14/18 0800   Gross per 24 hour   Intake          2637.94 ml   Output             2995 ml   Net          -357.06 ml       Physical Exam   Constitutional: He is oriented to person,  place, and time. He appears well-developed and well-nourished. No distress.   Cardiovascular: Normal rate.    Mechanical hum   Pulmonary/Chest: Effort normal. No respiratory distress.   Chest tube with serosang output   Abdominal: Soft.   Neurological: He is alert and oriented to person, place, and time.   Skin: Skin is warm and dry.   Psychiatric: He has a normal mood and affect. His behavior is normal.       Significant Labs:  CBC:   Recent Labs  Lab 03/14/18  0440   WBC 15.32*   RBC 2.61*   HGB 7.0*   HCT 21.8*      MCV 84   MCH 26.8*   MCHC 32.1     CMP:   Recent Labs  Lab 03/14/18  0440      CALCIUM 9.7   ALBUMIN 2.6*   PROT 6.5      K 4.2   CO2 31*   CL 96   BUN 37*   CREATININE 1.8*   ALKPHOS 120   ALT 29   AST 53*   BILITOT 3.7*     Coagulation:   Recent Labs  Lab 03/14/18  0604   INR 0.9   APTT 30.6       Significant Diagnostics:  CXR: I have reviewed all pertinent results/findings within the past 24 hours    Procedure: Device Interrogation Including analysis of device parameters  Current Settings: Ventricular Assist Device  Review of device function is stable  TXP LVAD INTERROGATIONS 3/14/2018 3/14/2018 3/14/2018 3/14/2018 3/14/2018 3/14/2018 3/14/2018   Type HeartMate II HeartMate II HeartMate II HeartMate II HeartMate II HeartMate II HeartMate II   Flow 6 5.7 5.2 5.6 6.0 5.7 6.0   Speed 9000 9000 9000 9000 9000 9000 9000   PI 5.7 5.8 6.5 5.4 5.4 4.8 4.8   Power ([place]) 5.4 5.6 5.2 5.7 5.7 5.6 5.6   LSL - - 8600 8600 8600 8600 8600   Pulsatility - - - Pulse Pulse Pulse Pulse

## 2018-03-14 NOTE — PLAN OF CARE
Problem: Patient Care Overview  Goal: Plan of Care Review  Outcome: Ongoing (interventions implemented as appropriate)  Pt remains AAOx4.  States he slept well last night.  Arterial line positional.  MAP 65-85.  O2 sats 92-96% on RA.  2L NC supplemental initiated for a couple hours while sleeping.  No LVAD alarms overnight.  Speed 9000.  Epi titrated to 0.02 @ 2100.  Dobutamine @ 4 and Lasix @ 10.  Heparin currently at 1800 U/hr. APTT drawn q6h and gtt titrated per MD orders.  Chest tube remains to wall suction with about 60 cc serosanguinous output during shift.  Voids spontaneously per bedside urinal.  Output adequate.  Accuchecks ACHS.  Supplemental insulin not required. Electrolytes replaced as ordered.  Right IJ central line pulled overnight.  Petroleum dressing with gauze currently in place with small amount of sanguinous drainage noted.  Plan of care reviewed with patient and spouse.  Questions and concerns addressed.

## 2018-03-14 NOTE — ASSESSMENT & PLAN NOTE
Neuro:   - Pain mgmt: oxycodone PRN     Pulmonary:   - Wean supplemental oxygen   - Frequent IS  - Daily chest xray, pneumo resolved     Cardiac:   - Drips: dobutamine 4, epi 0.02  - Wean epi 0.01 q12h  - No aspirin- Prevent II trial   - PO amiodarone  - TTE today     Renal:    - UOP 2.1L  - BUN/Cr down to 1.8  - Lasix gtt at 10     Hepatic:  LFTs MWF     ID:   - Cultures NGTD     Hem/Onc:   - Monitor hgb; stable  - Heparin gtt, PTT goal 40-50  - Coumadin 6mg tonight     Endocrine:    - Insulin subq  - Endocrine following      Fluids/Electrolytes/Nutrition/GI:   - Replace electrolytes PRN per protocol  - Clear liquids until return of bowel function, then advance    DISPO:  - Continue SICU care

## 2018-03-14 NOTE — SUBJECTIVE & OBJECTIVE
Interval History/Significant Events: NAEON, AFVSS.  1 chest tube removed, PICC line placed.  On 0.02 of epi.    Follow-up For: Procedure(s) (LRB):  CLOSURE-STERNAL WOUND (N/A)  PLACEMENT-NEOPERICARDIUM    Post-Operative Day: 4 Days Post-Op    Objective:     Vital Signs (Most Recent):  Temp: 98.5 °F (36.9 °C) (03/14/18 0300)  Pulse: 83 (03/14/18 0315)  Resp: 18 (03/14/18 0315)  BP: (!) 86/0 (03/14/18 0315)  SpO2: 98 % (03/14/18 0315) Vital Signs (24h Range):  Temp:  [96.7 °F (35.9 °C)-98.6 °F (37 °C)] 98.5 °F (36.9 °C)  Pulse:  [83-98] 83  Resp:  [10-30] 18  SpO2:  [86 %-100 %] 98 %  BP: ()/(0-77) 86/0  Arterial Line BP: ()/() 91/72     Weight: 124.6 kg (274 lb 11.1 oz)  Body mass index is 36.24 kg/m².      Intake/Output Summary (Last 24 hours) at 03/14/18 0619  Last data filed at 03/14/18 0500   Gross per 24 hour   Intake          2637.94 ml   Output             2940 ml   Net          -302.06 ml       Physical Exam   Constitutional: He is oriented to person, place, and time. He appears well-developed and well-nourished.   HENT:   Head: Normocephalic and atraumatic.   Eyes: EOM are normal. Pupils are equal, round, and reactive to light.   Neck: Neck supple.   Cardiovascular:   Mechanical hum   Pulmonary/Chest: Breath sounds normal. No respiratory distress. He has no wheezes.   Mediastinal chest tube x1 in place. SS output   Abdominal: Soft. He exhibits no distension. There is no tenderness.   Neurological: He is alert and oriented to person, place, and time.   Skin: Skin is warm and dry.       Vents:  Vent Mode: SIMV (03/11/18 0740)  Ventilator Initiated: Yes (03/08/18 1400)  Set Rate: 18 bmp (03/11/18 0740)  Vt Set: 550 mL (03/11/18 0740)  Pressure Support: 10 cmH20 (03/11/18 0740)  PEEP/CPAP: 5 cmH20 (03/11/18 0740)  Oxygen Concentration (%): 50 (03/11/18 0930)  Peak Airway Pressure: 26 cmH2O (03/11/18 0740)  Plateau Pressure: 0 cmH20 (03/11/18 0740)  Total Ve: 10.6 mL (03/11/18 0740)  F/VT  Ratio<105 (RSBI): (!) 36.52 (03/11/18 0523)    Lines/Drains/Airways     Peripherally Inserted Central Catheter Line                 PICC Triple Lumen 03/13/18 1521 right basilic less than 1 day          Drain                 Chest Tube 03/08/18 1125 1 Right Mediastinal 32 Fr. 5 days          Arterial Line                 Arterial Line 03/08/18 0721 Left Radial 5 days          Line                 VAD 03/08/18 1124 Left ventricular assist device HeartMate II 5 days          Peripheral Intravenous Line                 Midline Catheter Insertion/Assessment  - Single Lumen 03/07/18 1130 Right cephalic vein (lateral side of arm) 18g x 8cm 6 days                Significant Labs:    CBC/Anemia Profile:    Recent Labs  Lab 03/12/18  1919 03/13/18  0253 03/14/18  0440   WBC 13.53* 13.49* 15.32*   HGB 7.4* 7.2* 7.0*   HCT 23.1* 22.5* 21.8*    159 219   MCV 87 86 84   RDW 15.6* 15.5* 15.6*        Chemistries:    Recent Labs  Lab 03/13/18  0253  03/13/18  1806 03/13/18  2340 03/14/18  0440     --   --   --  137   K 4.5  < > 3.5 3.6 4.2   CL 97  --   --   --  96   CO2 30*  --   --   --  31*   BUN 39*  --   --   --  37*   CREATININE 1.9*  --   --   --  1.8*   CALCIUM 9.5  --   --   --  9.7   ALBUMIN  --   --   --   --  2.6*   PROT  --   --   --   --  6.5   BILITOT  --   --   --   --  3.7*   ALKPHOS  --   --   --   --  120   ALT  --   --   --   --  29   AST  --   --   --   --  53*   MG  --   < > 2.1 2.1 2.1   PHOS 2.7  --   --   --  2.2*   < > = values in this interval not displayed.    All pertinent labs within the past 24 hours have been reviewed.    Significant Imaging:  I have reviewed and interpreted all pertinent imaging results/findings within the past 24 hours.

## 2018-03-14 NOTE — PROGRESS NOTES
UPDATE    Attempted to see pt for update. Pt asleep and no family present. SW will make further attempts. Providing ongoing psychosocial and counseling support, education, resources, assistance and discharge planning as indicated. Following and available.

## 2018-03-14 NOTE — PROGRESS NOTES
[facility]  Critical Care - Surgery  Progress Note    Patient Name: Tim Richards  MRN: [id number]  Admission Date: 3/1/2018  Hospital Length of Stay: 13 days  Code Status: Full Code  Attending Provider: Yg Kaufman MD  Primary Care Provider: Ja Méndez MD   Principal Problem: Acute decompensated heart failure    Subjective:     Hospital/ICU Course:  3/8 - LVAD placement  3/9 - revision of outflow graft, chest remains open  3/10 - chest closure  3/11 - pt extubated, PTX noted  3/12 - 1U PRBC transfused for hgb 6.7. Pt went to IR for chest tube for PTX, canceled due to improving PTX.  3/13 - epi down to 0.02    Interval History/Significant Events: NAEON, AFVSS.  1 chest tube removed, PICC line placed.  On 0.02 of epi.    Follow-up For: Procedure(s) (LRB):  CLOSURE-STERNAL WOUND (N/A)  PLACEMENT-NEOPERICARDIUM    Post-Operative Day: 4 Days Post-Op    Objective:     Vital Signs (Most Recent):  Temp: 98.5 °F (36.9 °C) (03/14/18 0300)  Pulse: 83 (03/14/18 0315)  Resp: 18 (03/14/18 0315)  BP: (!) 86/0 (03/14/18 0315)  SpO2: 98 % (03/14/18 0315) Vital Signs (24h Range):  Temp:  [96.7 °F (35.9 °C)-98.6 °F (37 °C)] 98.5 °F (36.9 °C)  Pulse:  [83-98] 83  Resp:  [10-30] 18  SpO2:  [86 %-100 %] 98 %  BP: ()/(0-77) 86/0  Arterial Line BP: ()/() 91/72     Weight: 124.6 kg (274 lb 11.1 oz)  Body mass index is 36.24 kg/m².      Intake/Output Summary (Last 24 hours) at 03/14/18 0619  Last data filed at 03/14/18 0500   Gross per 24 hour   Intake          2637.94 ml   Output             2940 ml   Net          -302.06 ml       Physical Exam   Constitutional: He is oriented to person, place, and time. He appears well-developed and well-nourished.   HENT:   Head: Normocephalic and atraumatic.   Eyes: EOM are normal. Pupils are equal, round, and reactive to light.   Neck: Neck supple.   Cardiovascular:   Mechanical hum   Pulmonary/Chest: Breath sounds normal. No respiratory distress. He  has no wheezes.   Mediastinal chest tube x1 in place. SS output   Abdominal: Soft. He exhibits no distension. There is no tenderness.   Neurological: He is alert and oriented to person, place, and time.   Skin: Skin is warm and dry.       Vents:  Vent Mode: SIMV (03/11/18 0740)  Ventilator Initiated: Yes (03/08/18 1400)  Set Rate: 18 bmp (03/11/18 0740)  Vt Set: 550 mL (03/11/18 0740)  Pressure Support: 10 cmH20 (03/11/18 0740)  PEEP/CPAP: 5 cmH20 (03/11/18 0740)  Oxygen Concentration (%): 50 (03/11/18 0930)  Peak Airway Pressure: 26 cmH2O (03/11/18 0740)  Plateau Pressure: 0 cmH20 (03/11/18 0740)  Total Ve: 10.6 mL (03/11/18 0740)  F/VT Ratio<105 (RSBI): (!) 36.52 (03/11/18 0523)    Lines/Drains/Airways     Peripherally Inserted Central Catheter Line                 PICC Triple Lumen 03/13/18 1521 right basilic less than 1 day          Drain                 Chest Tube 03/08/18 1125 1 Right Mediastinal 32 Fr. 5 days          Arterial Line                 Arterial Line 03/08/18 0721 Left Radial 5 days          Line                 VAD 03/08/18 1124 Left ventricular assist device HeartMate II 5 days          Peripheral Intravenous Line                 Midline Catheter Insertion/Assessment  - Single Lumen 03/07/18 1130 Right cephalic vein (lateral side of arm) 18g x 8cm 6 days                Significant Labs:    CBC/Anemia Profile:    Recent Labs  Lab 03/12/18  1919 03/13/18  0253 03/14/18  0440   WBC 13.53* 13.49* 15.32*   HGB 7.4* 7.2* 7.0*   HCT 23.1* 22.5* 21.8*    159 219   MCV 87 86 84   RDW 15.6* 15.5* 15.6*        Chemistries:    Recent Labs  Lab 03/13/18  0253  03/13/18  1806 03/13/18  2340 03/14/18  0440     --   --   --  137   K 4.5  < > 3.5 3.6 4.2   CL 97  --   --   --  96   CO2 30*  --   --   --  31*   BUN 39*  --   --   --  37*   CREATININE 1.9*  --   --   --  1.8*   CALCIUM 9.5  --   --   --  9.7   ALBUMIN  --   --   --   --  2.6*   PROT  --   --   --   --  6.5   BILITOT  --   --   --   --   3.7*   ALKPHOS  --   --   --   --  120   ALT  --   --   --   --  29   AST  --   --   --   --  53*   MG  --   < > 2.1 2.1 2.1   PHOS 2.7  --   --   --  2.2*   < > = values in this interval not displayed.    All pertinent labs within the past 24 hours have been reviewed.    Significant Imaging:  I have reviewed and interpreted all pertinent imaging results/findings within the past 24 hours.    Assessment/Plan:     Chronic systolic heart failure    Neuro:   -off sedation  -pain controlled with oxycodone     Pulmonary:   - extubated 3/11  - Scheduled duo-nebs  - Nitric. Wean per CTS  - R side PTX first noted after extubation 3/11, CXR 3/12 showed increased size, went to IR, IR saw smaller PTX, chest tube canceled, 3/13 PTX smaller in size on CXR.      Cardiac:  - s/p LVAD 3/8/18, outflow tract revision on 3/9, and chest closure 3/10  - Pressors: Epi, dobutamine  - Wean pressors per CTS  - Amiodarone transitioned to PO     Renal:   - CKD; baseline Cr. 1.7  - WAGNER on 3/8 and 3/11, improving  - Lasix drip  - Monitor UOP     Infectious Disease:   -Leukocytosis on 3/8 after initial surgery, improved, stable at 14, trend WBC  -Low grade fevers on 3/8 after initial surgery, torsten to >38C on 3/10  -Blood cultures 3/6 NG, 3/11 NGTD  -vancomycin and doxycycline on 3/8  -cefazolin for surgery 3/9 and 3/10  -fluconazole, cefepime, bacitracin IM started 3/8, continuing     Hematology/Oncology:  - Trend CBC, H/H slowly trending down, transfuse per CTS  - iron supplmentation  - On heparin drip, started warfarin 3/12     Endocrine:  - Insuline gtt  - Endocrine on board     Fluids/Electrolytes/Nutrition/GI:   - tolerating PO intake, advance as tolerated  - Trend CMP  - Replace Lytes PRN      Dispo:  Continue SICU care. CTS primary.           Critical care was time spent personally by me on the following activities: development of treatment plan with patient or surrogate and bedside caregivers, discussions with consultants, evaluation of  patient's response to treatment, examination of patient, ordering and performing treatments and interventions, ordering and review of laboratory studies, ordering and review of radiographic studies, pulse oximetry, re-evaluation of patient's condition.  This critical care time did not overlap with that of any other provider or involve time for any procedures.     Fam Patel MD  Critical Care - Surgery  [facility]

## 2018-03-14 NOTE — PLAN OF CARE
Problem: Occupational Therapy Goal  Goal: Occupational Therapy Goal  Goals to be met by: 3/26/18     Patient will increase functional independence with ADLs by performing:    Feeding with [place].  UE Dressing with Supervision.  LE Dressing with Supervision.  Grooming while standing at sink with Supervision.  Toileting from toilet with Supervision for hygiene and clothing management.   Toilet transfer to toilet with Supervision.  Pt will be (I) with VAD management during ADL.     Goals remain appropriate.

## 2018-03-14 NOTE — PT/OT/SLP PROGRESS
Occupational Therapy   Treatment    Name: Tim Richards  MRN: [id number]  Admitting Diagnosis:  Acute decompensated heart failure  4 Days Post-Op    Recommendations:     Discharge Recommendations: home with home health      Subjective     Communicated with: sterling prior to session.  Pain/Comfort:  · Pain Rating 1: 3/10  · Location 1: abdomen  · Pain Addressed 1: Distraction, Reposition, Nurse notified, Pre-medicate for activity    Patients cultural, spiritual, Mandaeism conflicts given the current situation: none reported    Objective:     Patient found seated in b/s chair with LVAD to wall power.       General Precautions: Standard, fall, LVAD, sternal     Occupational Performance:    Functional Mobility/Transfers:  · Patient completed Sit <> Stand Transfer with minimum assistance  with  no assistive device   · Patient completed Toilet Transfer Stand Pivot technique with minimum assistance with  bedside commode      Activities of Daily Living:  · Feeding:  independence    · UB Dressing: maximal assistance    · Toileting: maximal assistance      Patient left up in chair with all lines intact, call button in reach, LVAD to battery power and PT arriving to room to assist with ambulation.     [facility] 6 Click:  [facility] Total Score: 13    Treatment & Education:  Pt able to identify power cables and drive line upon OT arrival. Further education provided re: LVAD item identification with good recall as session continued. With cues for sequencing and increased time, pt able to transition LVAD to battery power. Assistance provided to place items in shoulder consolidation bag.  Pt needing cues to adhere to sternal precautions and recquired cues to recal these precautions. Further education provided re: sternal precautions and implications on functional activity.  Education also provided re: OT POC and safety with functional mobility/ADL skills.   Education:    Assessment:     Tim Richards is a 52 y.o. male with a medical  diagnosis of Acute decompensated heart failure.    Performance deficits affecting function are gait instability, impaired balance, impaired endurance, weakness, impaired sensation, impaired self care skills, impaired functional mobilty, pain.  Pt tolerated session fairly well. VSS throughout session.     Rehab Prognosis:  Good ; patient would benefit from acute skilled OT services to address these deficits and reach maximum level of function.       Plan:     Patient to be seen 6 x/week to address the above listed problems via self-care/home management, therapeutic exercises, therapeutic activities  · Plan of Care Expires: 04/11/18  · Plan of Care Reviewed with: patient, spouse    This Plan of care has been discussed with the patient who was involved in its development and understands and is in agreement with the identified goals and treatment plan    GOALS:    Occupational Therapy Goals        Problem: Occupational Therapy Goal    Goal Priority Disciplines Outcome Interventions   Occupational Therapy Goal     OT, PT/OT Ongoing (interventions implemented as appropriate)    Description:  Goals to be met by: 3/26/18     Patient will increase functional independence with ADLs by performing:    Feeding with [place].  UE Dressing with Supervision.  LE Dressing with Supervision.  Grooming while standing at sink with Supervision.  Toileting from toilet with Supervision for hygiene and clothing management.   Toilet transfer to toilet with Supervision.  Pt will be (I) with VAD management during ADL.                      Time Tracking:     OT Date of Treatment: 03/14/18  OT Start Time: 0930  OT Stop Time: 1000  OT Total Time (min): 30 min    Billable Minutes:all lines intact, call button in reach, PT arriving to room for ambulation present and      CLIFF Ritchie  3/14/2018

## 2018-03-14 NOTE — ADDENDUM NOTE
Addendum  created 03/14/18 1241 by Shamir Celis MD    Anesthesia Intra Blocks edited, Sign clinical note

## 2018-03-14 NOTE — PROCEDURES
Patient sitting in chair NAD; family at bedside VAD interrogation completed this AM in the event changes needed to be made. Will continue to monitor for further issues.     Pulsatile: Yes   VAD Sounds: Smooth  Problems / Issues / Alarms with VAD if any: None noted      VAD Interrogation:  TXP LVAD INTERROGATIONS 3/14/2018 3/14/2018 3/14/2018 3/14/2018 3/14/2018 3/14/2018 3/14/2018   Type HeartMate II HeartMate II HeartMate II HeartMate II HeartMate II HeartMate II HeartMate II   Flow 6 5.7 5.2 5.6 6.0 5.7 6.0   Speed 9000 9000 9000 9000 9000 9000 9000   PI 5.7 5.8 6.5 5.4 5.4 4.8 4.8   Power ([place]) 5.4 5.6 5.2 5.7 5.7 5.6 5.6   LSL - - 8600 8600 8600 8600 8600   Pulsatility - - - Pulse Pulse Pulse Pulse   }

## 2018-03-14 NOTE — PROGRESS NOTES
[facility]  Cardiothoracic Surgery  Progress Note    Patient Name: Tim Richards  MRN: [id number]  Admission Date: 3/1/2018  Hospital Length of Stay: 13 days  Code Status: Full Code   Attending Physician: Yg Kaufman MD   Referring Provider: Amy Rhodes MD  Principal Problem:Acute decompensated heart failure    Subjective:     Post-Op Info:  Procedure(s) (LRB):  CLOSURE-STERNAL WOUND (N/A)  PLACEMENT-NEOPERICARDIUM   4 Days Post-Op     Subjective:     Post-Op Info:  Procedure(s) (LRB):  CLOSURE-STERNAL WOUND (N/A)  PLACEMENT-NEOPERICARDIUM   4 Days Post-Op        Interval History:   NAEON. Tbili increased to 3.7 from 3.3. Tolerating diet.    Medications:  Continuous Infusions:   DOBUTamine 4 mcg/kg/min (03/14/18 1000)    epinephrine infusion 0.01 mcg/kg/min (03/14/18 1000)    custom IV infusion builder (for pharmacist use only)      heparin (porcine) in 5 % dex 2,000 Units/hr (03/14/18 1000)     Scheduled Meds:   amiodarone  400 mg Oral BID    docusate sodium  200 mg Oral QHS    ferrous gluconate  324 mg Oral Daily with breakfast    INV aspirin/placebo  81 mg Oral Daily    k phos di & mono-sod phos mono  2 tablet Oral Q4H    pantoprazole  40 mg Oral Daily    polyethylene glycol  17 g Oral Daily    potassium chloride SA  40 mEq Oral BID    sodium chloride 0.9%  10 mL Intravenous Q6H    sodium chloride 0.9%  3 mL Intravenous Q8H    warfarin  6 mg Oral Daily     PRN Meds:acetaminophen, albumin human 5%, albuterol sulfate, bisacodyl, dextrose 50%, dextrose 50%, glucagon (human recombinant), glucose, glucose, insulin aspart U-100, magnesium hydroxide 400 mg/5 ml, oxyCODONE, oxyCODONE, Flushing PICC Protocol **AND** sodium chloride 0.9% **AND** sodium chloride 0.9%     Objective:     Vital Signs (Most Recent):  Temp: 98.5 °F (36.9 °C) (03/14/18 0700)  Pulse: 86 (03/14/18 1045)  Resp: (!) 23 (03/14/18 1045)  BP: 94/61 (03/14/18 0830)  SpO2: 99 % (03/14/18 0900) Vital Signs (24h  Range):  Temp:  [97.6 °F (36.4 °C)-98.6 °F (37 °C)] 98.5 °F (36.9 °C)  Pulse:  [] 86  Resp:  [10-32] 23  SpO2:  [88 %-100 %] 99 %  BP: ()/(0-77) 94/61  Arterial Line BP: ()/() 92/69       Intake/Output Summary (Last 24 hours) at 03/14/18 1056  Last data filed at 03/14/18 0800   Gross per 24 hour   Intake          2637.94 ml   Output             2995 ml   Net          -357.06 ml       Physical Exam   Constitutional: He is oriented to person, place, and time. He appears well-developed and well-nourished. No distress.   Cardiovascular: Normal rate.    Mechanical hum   Pulmonary/Chest: Effort normal. No respiratory distress.   Chest tube with serosang output   Abdominal: Soft.   Neurological: He is alert and oriented to person, place, and time.   Skin: Skin is warm and dry.   Psychiatric: He has a normal mood and affect. His behavior is normal.       Significant Labs:  CBC:   Recent Labs  Lab 03/14/18  0440   WBC 15.32*   RBC 2.61*   HGB 7.0*   HCT 21.8*      MCV 84   MCH 26.8*   MCHC 32.1     CMP:   Recent Labs  Lab 03/14/18  0440      CALCIUM 9.7   ALBUMIN 2.6*   PROT 6.5      K 4.2   CO2 31*   CL 96   BUN 37*   CREATININE 1.8*   ALKPHOS 120   ALT 29   AST 53*   BILITOT 3.7*     Coagulation:   Recent Labs  Lab 03/14/18  0604   INR 0.9   APTT 30.6       Significant Diagnostics:  CXR: I have reviewed all pertinent results/findings within the past 24 hours    Procedure: Device Interrogation Including analysis of device parameters  Current Settings: Ventricular Assist Device  Review of device function is stable  TXP LVAD INTERROGATIONS 3/14/2018 3/14/2018 3/14/2018 3/14/2018 3/14/2018 3/14/2018 3/14/2018   Type HeartMate II HeartMate II HeartMate II HeartMate II HeartMate II HeartMate II HeartMate II   Flow 6 5.7 5.2 5.6 6.0 5.7 6.0   Speed 9000 9000 9000 9000 9000 9000 9000   PI 5.7 5.8 6.5 5.4 5.4 4.8 4.8   Power ([place]) 5.4 5.6 5.2 5.7 5.7 5.6 5.6   [facility] - - 8600 8600 8600  8600 8600   Pulsatility - - - Pulse Pulse Pulse Pulse     Assessment/Plan:     * Acute decompensated heart failure    S/p LVAD         LVAD (left ventricular assist device) present    Neuro:   - Pain mgmt: oxycodone PRN     Pulmonary:   - Wean supplemental oxygen   - Frequent IS  - Daily chest xray, pneumo resolved     Cardiac:   - Drips: dobutamine 4, epi 0.02  - Wean epi 0.01 q12h  - No aspirin- Prevent II trial   - PO amiodarone  - TTE today     Renal:    - UOP 2.1L  - BUN/Cr down to 1.8  - Lasix gtt at 10     Hepatic:  LFTs MWF     ID:   - Cultures NGTD     Hem/Onc:   - Monitor hgb; stable  - Heparin gtt, PTT goal 40-50  - Coumadin 6mg tonight     Endocrine:    - Insulin subq  - Endocrine following      Fluids/Electrolytes/Nutrition/GI:   - Replace electrolytes PRN per protocol  - Clear liquids until return of bowel function, then advance    DISPO:  - Continue SICU care              Vidhi Nicolas MD  Cardiothoracic Surgery  [facility]

## 2018-03-14 NOTE — PLAN OF CARE
Problem: SLP Goal  Goal: SLP Goal  Speech Language Pathology Goals  Goals expected to be met by 3/18    1. Pt will tolerate diet of thin liquids and soft solids without overt clinical signs of aspiration   2. Pt will tolerate trials of regular solids within speech therapy sessions to help determine least restrictive diet        Pt with good progress towards goals     Aeysha Vora MS, CCC-SLP  Speech Language Pathologist  Pager: [phone]  Date 3/14/2018

## 2018-03-14 NOTE — ADDENDUM NOTE
Addendum  created 03/14/18 1253 by Shamir Celis MD    Anesthesia Intra Blocks edited, Sign clinical note

## 2018-03-14 NOTE — PLAN OF CARE
Problem: Physical Therapy Goal  Goal: Physical Therapy Goal  Goals to be met by: 3/26     Patient will increase functional independence with mobility by performin. Supine to sit with Stand-by Assistance-not met  2. Sit to supine with Stand-by Assistance-not met  3. Sit to stand transfer with Stand-by Assistance -not met  4. Gait  x 100 feet with Stand-by Assistance -not met     Goals remain appropriate. Nayana Alvarez, PT 3/14/2018

## 2018-03-14 NOTE — SUBJECTIVE & OBJECTIVE
Interval History: PICC line placed yesterday.  He has some upper abdominal discomfort that feels musculoskeletal in nature.  He still has not had a bowel movement.  He is passing gas.  CVP is 9 this morning     Continuous Infusions:   DOBUTamine 4 mcg/kg/min (03/14/18 1000)    epinephrine infusion 0.01 mcg/kg/min (03/14/18 1000)    custom IV infusion builder (for pharmacist use only)      heparin (porcine) in 5 % dex 2,000 Units/hr (03/14/18 1000)     Scheduled Meds:   amiodarone  400 mg Oral BID    docusate sodium  200 mg Oral QHS    ferrous gluconate  324 mg Oral Daily with breakfast    INV aspirin/placebo  81 mg Oral Daily    k phos di & mono-sod phos mono  2 tablet Oral Q4H    pantoprazole  40 mg Oral Daily    polyethylene glycol  17 g Oral Daily    potassium chloride SA  40 mEq Oral BID    sodium chloride 0.9%  10 mL Intravenous Q6H    sodium chloride 0.9%  3 mL Intravenous Q8H    warfarin  6 mg Oral Daily     PRN Meds:acetaminophen, albumin human 5%, albuterol sulfate, bisacodyl, dextrose 50%, dextrose 50%, glucagon (human recombinant), glucose, glucose, insulin aspart U-100, magnesium hydroxide 400 mg/5 ml, oxyCODONE, oxyCODONE, Flushing PICC Protocol **AND** sodium chloride 0.9% **AND** sodium chloride 0.9%    Review of patient's allergies indicates:   Allergen Reactions    Lisinopril Anaphylaxis     Objective:     Vital Signs (Most Recent):  Temp: 98.5 °F (36.9 °C) (03/14/18 0700)  Pulse: 91 (03/14/18 0915)  Resp: 17 (03/14/18 0915)  BP: 94/61 (03/14/18 0830)  SpO2: 99 % (03/14/18 0900) Vital Signs (24h Range):  Temp:  [97.6 °F (36.4 °C)-98.6 °F (37 °C)] 98.5 °F (36.9 °C)  Pulse:  [] 91  Resp:  [10-31] 17  SpO2:  [88 %-100 %] 99 %  BP: ()/(0-77) 94/61  Arterial Line BP: ()/() 88/70     Patient Vitals for the past 72 hrs (Last 3 readings):   Weight   03/14/18 0500 124.6 kg (274 lb 11.1 oz)   03/12/18 1721 124.6 kg (274 lb 11.1 oz)     Body mass index is 36.24  kg/m².      Intake/Output Summary (Last 24 hours) at 03/14/18 1046  Last data filed at 03/14/18 0800   Gross per 24 hour   Intake          2637.94 ml   Output             2995 ml   Net          -357.06 ml     Hemodynamic Parameters:    Physical Exam   Constitutional: He appears well-developed and well-nourished. No distress.   HENT:   Head: Normocephalic and atraumatic.   Neck: Normal range of motion. No JVD present.   Cardiovascular: Normal rate.  Exam reveals no friction rub.    No murmur heard.  Normal VAD hum   Pulmonary/Chest: Effort normal. No respiratory distress. He has no wheezes. He has rales.   Faint left lower lobe   Abdominal: Soft. He exhibits no distension.   Musculoskeletal: Normal range of motion. He exhibits no edema.   Neurological: He is alert.   Skin: Skin is warm. Capillary refill takes 2 to 3 seconds. He is not diaphoretic. No erythema.     Significant Labs:  CBC:    Recent Labs  Lab 03/12/18  1919 03/13/18  0253 03/14/18  0440   WBC 13.53* 13.49* 15.32*   RBC 2.67* 2.61* 2.61*   HGB 7.4* 7.2* 7.0*   HCT 23.1* 22.5* 21.8*    159 219   MCV 87 86 84   MCH 27.7 27.6 26.8*   MCHC 32.0 32.0 32.1     BNP:    Recent Labs  Lab 03/09/18  0308 03/12/18  0506 03/14/18  0440   BNP 1,114* 1,419* 1,324*     CMP:    Recent Labs  Lab 03/11/18  0422  03/12/18  0506  03/13/18  0253  03/13/18  1806 03/13/18  2340 03/14/18  0440   *  145*  145*  < > 131*  --  124*  --   --   --  104   CALCIUM 9.5  9.5  9.5  < > 9.4  --  9.5  --   --   --  9.7   ALBUMIN 3.0*  --  2.7*  --   --   --   --   --  2.6*   PROT 6.4  --  6.3  --   --   --   --   --  6.5     138  138  < > 139  --  136  --   --   --  137   K 3.6  3.6  3.6  < > 3.9  3.9  < > 4.5  < > 3.5 3.6 4.2   CO2 27  27  27  < > 27  --  30*  --   --   --  31*   CL 99  99  99  < > 100  --  97  --   --   --  96   BUN 37*  37*  37*  < > 42*  --  39*  --   --   --  37*   CREATININE 2.6*  2.6*  2.6*  < > 2.2*  --  1.9*  --   --   --  1.8*    ALKPHOS 63  --  79  --   --   --   --   --  120   ALT 16  --  21  --   --   --   --   --  29   AST 44*  --  55*  --   --   --   --   --  53*   BILITOT 2.4*  --  3.3*  --   --   --   --   --  3.7*   < > = values in this interval not displayed.   Coagulation:     Recent Labs  Lab 03/12/18  0506  03/13/18  0253  03/13/18  1806 03/13/18  2340 03/14/18  0604   INR 1.0  --  1.0  --   --   --  0.9   APTT 25.0  < > <21.0  < > 24.6 28.4 30.6   < > = values in this interval not displayed.  LDH:    Recent Labs  Lab 03/12/18  0506 03/13/18  0253 03/14/18  0440   * 393* 411*     Microbiology:  Microbiology Results (last 7 days)     Procedure Component Value Units Date/Time    Blood culture [[id number]] Collected:  03/11/18 0651    Order Status:  Completed Specimen:  Blood from Peripheral, Wrist, Right Updated:  03/14/18 1012     Blood Culture, Routine No Growth to date     Blood Culture, Routine No Growth to date     Blood Culture, Routine No Growth to date     Blood Culture, Routine No Growth to date    Blood culture [[id number]] Collected:  03/11/18 0650    Order Status:  Completed Specimen:  Blood from Peripheral, Hand, Right Updated:  03/14/18 1012     Blood Culture, Routine No Growth to date     Blood Culture, Routine No Growth to date     Blood Culture, Routine No Growth to date     Blood Culture, Routine No Growth to date    Culture, Respiratory with Gram Stain [[id number]] Collected:  03/11/18 0800    Order Status:  Completed Specimen:  Respiratory from Endotracheal Aspirate Updated:  03/13/18 0856     Respiratory Culture Further report to follow     Gram Stain (Respiratory) <10 epithelial cells per low power field.     Gram Stain (Respiratory) Few WBC's     Gram Stain (Respiratory) Rare Gram positive cocci     Gram Stain (Respiratory) Rare Gram positive rods    Blood culture [[id number]] Collected:  03/06/18 1107    Order Status:  Completed Specimen:  Blood Updated:  03/11/18 1612     Blood Culture, Routine No  growth after 5 days.    Blood culture [[id number]] Collected:  03/06/18 1107    Order Status:  Completed Specimen:  Blood Updated:  03/11/18 1612     Blood Culture, Routine No growth after 5 days.    IV catheter culture [[id number]] Collected:  03/07/18 1151    Order Status:  Completed Specimen:  Catheter Tip from Catheter Tip, Intrajugular Updated:  03/10/18 0659     Aerobic Culture - Cath tip No growth          I have reviewed all pertinent labs within the past 24 hours.    Estimated Creatinine Clearance: 67.2 mL/min (A) (based on SCr of 1.8 mg/dL (H)).    Diagnostic Results:  I have reviewed and interpreted all pertinent imaging results/findings within the past 24 hours.

## 2018-03-14 NOTE — ASSESSMENT & PLAN NOTE
-HeartMate II Implanted 3/8/18 as DT with repositioning of outflow graft 3/9/18.  S/p chest closure 3/10/18  -CTS Primary  -Implanted by Dr. Kaufman  -Anticoagulation per Primary Coumadin  -Antiplatelets; Patient is enrolled in PREVENT II  -Speed set at 9000, LSL 8600 rpm  -Interrogation notable for no events  -Not listed for OHTx  - On Epi (with plans to have it weaned off by this evening), and ; NO off

## 2018-03-14 NOTE — PT/OT/SLP PROGRESS
"Speech Language Pathology Treatment    Patient Name:  Tim Richards   MRN:  [id number]  Admitting Diagnosis: Acute decompensated heart failure    Recommendations:                 General Recommendations:  Dysphagia therapy  Diet recommendations:  Dental Soft, Liquid Diet Level: Thin   Aspiration Precautions: Standard aspiration precautions   General Precautions: Standard, LVAD, fall, sternal  Communication strategies:  none    Subjective     "I'm just doing what they tell me"    Pain/Comfort:  · Pain Rating 1: 0/10  · Pain Rating Post-Intervention 1: 0/10    Objective:     Has the patient been evaluated by SLP for swallowing?   Yes  Keep patient NPO? No (diet to be advanced per medical team )   Current Respiratory Status:        Pt with improved alertness this date. Remains on clears only per medical team and awaiting abdominal x-ray prior to being advanced by medical team. Pt strictly NPO this date but was tolerating clears this AM. Pt continues with strong clear vocal quality.  Speech to continue to follow to monitor Pt progress with complete meals.  Spouse at the bedside and demonstrated understanding and agreement with plan.     Assessment:     Tim Richards is a 52 y.o. male with an SLP diagnosis of gradually improving dysphagia .      Goals:    SLP Goals        Problem: SLP Goal    Goal Priority Disciplines Outcome   SLP Goal     SLP    Description:  Speech Language Pathology Goals  Goals expected to be met by 3/18    1. Pt will tolerate diet of thin liquids and soft solids without overt clinical signs of aspiration   2. Pt will tolerate trials of regular solids within speech therapy sessions to help determine least restrictive diet                       Plan:     · Patient to be seen:  3 x/week   · Plan of Care expires:  04/09/18  · Plan of Care reviewed with:      · SLP Follow-Up:  Yes       Discharge recommendations:  home (likely no ST follow up )   Barriers to Discharge:  None per ST discipline "     Time Tracking:     SLP Treatment Date:   03/14/18  Speech Start Time:  1120  Speech Stop Time:  1129     Speech Total Time (min):  9 min    Billable Minutes: [facility]/Home Management Training 9    Ayesha Vora CCC-ISIS  03/14/2018

## 2018-03-14 NOTE — ASSESSMENT & PLAN NOTE
-NICM   -EF: <10%; LVEDD: 9.7 cm  -now s/p HM II LVAD  -diuresing with Lasix drip: net negative over last 24 hours and CVP is 9

## 2018-03-14 NOTE — PROGRESS NOTES
[facility]  Heart Transplant  Progress Note    Patient Name: Tim Richards  MRN: [id number]  Admission Date: 3/1/2018  Hospital Length of Stay: 13 days  Attending Physician: Yg Kaufman MD  Primary Care Provider: Ja Méndez MD  Principal Problem:Acute decompensated heart failure    Subjective:     Interval History: PICC line placed yesterday.  He has some upper abdominal discomfort that feels musculoskeletal in nature.  He still has not had a bowel movement.  He is passing gas.  CVP is 9 this morning     Continuous Infusions:   DOBUTamine 4 mcg/kg/min (03/14/18 1000)    epinephrine infusion 0.01 mcg/kg/min (03/14/18 1000)    custom IV infusion builder (for pharmacist use only)      heparin (porcine) in 5 % dex 2,000 Units/hr (03/14/18 1000)     Scheduled Meds:   amiodarone  400 mg Oral BID    docusate sodium  200 mg Oral QHS    ferrous gluconate  324 mg Oral Daily with breakfast    INV aspirin/placebo  81 mg Oral Daily    k phos di & mono-sod phos mono  2 tablet Oral Q4H    pantoprazole  40 mg Oral Daily    polyethylene glycol  17 g Oral Daily    potassium chloride SA  40 mEq Oral BID    sodium chloride 0.9%  10 mL Intravenous Q6H    sodium chloride 0.9%  3 mL Intravenous Q8H    warfarin  6 mg Oral Daily     PRN Meds:acetaminophen, albumin human 5%, albuterol sulfate, bisacodyl, dextrose 50%, dextrose 50%, glucagon (human recombinant), glucose, glucose, insulin aspart U-100, magnesium hydroxide 400 mg/5 ml, oxyCODONE, oxyCODONE, Flushing PICC Protocol **AND** sodium chloride 0.9% **AND** sodium chloride 0.9%    Review of patient's allergies indicates:   Allergen Reactions    Lisinopril Anaphylaxis     Objective:     Vital Signs (Most Recent):  Temp: 98.5 °F (36.9 °C) (03/14/18 0700)  Pulse: 91 (03/14/18 0915)  Resp: 17 (03/14/18 0915)  BP: 94/61 (03/14/18 0830)  SpO2: 99 % (03/14/18 0900) Vital Signs (24h Range):  Temp:  [97.6 °F (36.4 °C)-98.6 °F (37 °C)] 98.5 °F  (36.9 °C)  Pulse:  [] 91  Resp:  [10-31] 17  SpO2:  [88 %-100 %] 99 %  BP: ()/(0-77) 94/61  Arterial Line BP: ()/() 88/70     Patient Vitals for the past 72 hrs (Last 3 readings):   Weight   03/14/18 0500 124.6 kg (274 lb 11.1 oz)   03/12/18 1721 124.6 kg (274 lb 11.1 oz)     Body mass index is 36.24 kg/m².      Intake/Output Summary (Last 24 hours) at 03/14/18 1046  Last data filed at 03/14/18 0800   Gross per 24 hour   Intake          2637.94 ml   Output             2995 ml   Net          -357.06 ml     Hemodynamic Parameters:    Physical Exam   Constitutional: He appears well-developed and well-nourished. No distress.   HENT:   Head: Normocephalic and atraumatic.   Neck: Normal range of motion. No JVD present.   Cardiovascular: Normal rate.  Exam reveals no friction rub.    No murmur heard.  Normal VAD hum   Pulmonary/Chest: Effort normal. No respiratory distress. He has no wheezes. He has rales.   Faint left lower lobe   Abdominal: Soft. He exhibits no distension.   Musculoskeletal: Normal range of motion. He exhibits no edema.   Neurological: He is alert.   Skin: Skin is warm. Capillary refill takes 2 to 3 seconds. He is not diaphoretic. No erythema.     Significant Labs:  CBC:    Recent Labs  Lab 03/12/18  1919 03/13/18  0253 03/14/18  0440   WBC 13.53* 13.49* 15.32*   RBC 2.67* 2.61* 2.61*   HGB 7.4* 7.2* 7.0*   HCT 23.1* 22.5* 21.8*    159 219   MCV 87 86 84   MCH 27.7 27.6 26.8*   MCHC 32.0 32.0 32.1     BNP:    Recent Labs  Lab 03/09/18  0308 03/12/18  0506 03/14/18  0440   BNP 1,114* 1,419* 1,324*     CMP:    Recent Labs  Lab 03/11/18  0422  03/12/18  0506  03/13/18  0253  03/13/18  1806 03/13/18  2340 03/14/18  0440   *  145*  145*  < > 131*  --  124*  --   --   --  104   CALCIUM 9.5  9.5  9.5  < > 9.4  --  9.5  --   --   --  9.7   ALBUMIN 3.0*  --  2.7*  --   --   --   --   --  2.6*   PROT 6.4  --  6.3  --   --   --   --   --  6.5     138  138  < > 139   --  136  --   --   --  137   K 3.6  3.6  3.6  < > 3.9  3.9  < > 4.5  < > 3.5 3.6 4.2   CO2 27  27  27  < > 27  --  30*  --   --   --  31*   CL 99  99  99  < > 100  --  97  --   --   --  96   BUN 37*  37*  37*  < > 42*  --  39*  --   --   --  37*   CREATININE 2.6*  2.6*  2.6*  < > 2.2*  --  1.9*  --   --   --  1.8*   ALKPHOS 63  --  79  --   --   --   --   --  120   ALT 16  --  21  --   --   --   --   --  29   AST 44*  --  55*  --   --   --   --   --  53*   BILITOT 2.4*  --  3.3*  --   --   --   --   --  3.7*   < > = values in this interval not displayed.   Coagulation:     Recent Labs  Lab 03/12/18  0506  03/13/18  0253  03/13/18  1806 03/13/18  2340 03/14/18  0604   INR 1.0  --  1.0  --   --   --  0.9   APTT 25.0  < > <21.0  < > 24.6 28.4 30.6   < > = values in this interval not displayed.  LDH:    Recent Labs  Lab 03/12/18  0506 03/13/18  0253 03/14/18  0440   * 393* 411*     Microbiology:  Microbiology Results (last 7 days)     Procedure Component Value Units Date/Time    Blood culture [[id number]] Collected:  03/11/18 0651    Order Status:  Completed Specimen:  Blood from Peripheral, Wrist, Right Updated:  03/14/18 1012     Blood Culture, Routine No Growth to date     Blood Culture, Routine No Growth to date     Blood Culture, Routine No Growth to date     Blood Culture, Routine No Growth to date    Blood culture [[id number]] Collected:  03/11/18 0650    Order Status:  Completed Specimen:  Blood from Peripheral, Hand, Right Updated:  03/14/18 1012     Blood Culture, Routine No Growth to date     Blood Culture, Routine No Growth to date     Blood Culture, Routine No Growth to date     Blood Culture, Routine No Growth to date    Culture, Respiratory with Gram Stain [[id number]] Collected:  03/11/18 0800    Order Status:  Completed Specimen:  Respiratory from Endotracheal Aspirate Updated:  03/13/18 0856     Respiratory Culture Further report to follow     Gram Stain (Respiratory) <10 epithelial  cells per low power field.     Gram Stain (Respiratory) Few WBC's     Gram Stain (Respiratory) Rare Gram positive cocci     Gram Stain (Respiratory) Rare Gram positive rods    Blood culture [[id number]] Collected:  03/06/18 1107    Order Status:  Completed Specimen:  Blood Updated:  03/11/18 1612     Blood Culture, Routine No growth after 5 days.    Blood culture [[id number]] Collected:  03/06/18 1107    Order Status:  Completed Specimen:  Blood Updated:  03/11/18 1612     Blood Culture, Routine No growth after 5 days.    IV catheter culture [[id number]] Collected:  03/07/18 1151    Order Status:  Completed Specimen:  Catheter Tip from Catheter Tip, Intrajugular Updated:  03/10/18 0659     Aerobic Culture - Cath tip No growth          I have reviewed all pertinent labs within the past 24 hours.    Estimated Creatinine Clearance: 67.2 mL/min (A) (based on SCr of 1.8 mg/dL (H)).    Diagnostic Results:  I have reviewed and interpreted all pertinent imaging results/findings within the past 24 hours.    Assessment and Plan:     LVAD (left ventricular assist device) present    -HeartMate II Implanted 3/8/18 as DT with repositioning of outflow graft 3/9/18.  S/p chest closure 3/10/18  -CTS Primary  -Implanted by Dr. Kaufman  -Anticoagulation per Primary Coumadin  -Antiplatelets; Patient is enrolled in PREVENT II  -Speed set at 9000, LSL 8600 rpm  -Interrogation notable for no events  -Not listed for OHTx  - On Epi (with plans to have it weaned off by this evening), and ; NO off            Acute on chronic combined systolic and diastolic heart failure    -NICM   -EF: <10%; LVEDD: 9.7 cm  -now s/p HM II LVAD  -diuresing with Lasix drip: net negative over last 24 hours and CVP is 9            CKD (chronic kidney disease), stage III    - cr 1.8 today  -Baseline creat 1.6-2.0.        PVT (paroxysmal ventricular tachycardia)    -has ICD  -Transitioned from IV Amiodarone infusion to po         Hyperbilirubinemia    -likely  secondary to above  -however, up to 3.7  -RUQ ultrasound ordered for today.         Biventricular implantable cardioverter-defibrillator in situ    - in place  - ICD interrogated, event noted on 2/25/18 with appropriate shock for VT  -Continue Amiodarone po        Anemia    -Transfused a unit PRBC's 3/12  -H/H stable        Pneumothorax    -IR unable to place chest tube or pig tail; monitor daily CXR            YANET Coleman  Heart Transplant  [facility]

## 2018-03-14 NOTE — PT/OT/SLP PROGRESS
Physical Therapy Treatment    Patient Name:  Tim Richards   MRN:  [id number]    Recommendations:     Discharge Recommendations:  home health PT   Discharge Equipment Recommendations: none   Barriers to discharge: None    Assessment:     Tim Richards is a 52 y.o. male admitted with a medical diagnosis of Acute decompensated heart failure.  He presents with the following impairments/functional limitations:  weakness, impaired endurance, impaired functional mobilty, gait instability, impaired balance, decreased lower extremity function pt meredith treatment better being able to gait train farther. Pt will benefit from cont skilled PT to address deficits and will be able to discharge home when medically stable. Pt may need HHPT but no DME with discharge. Pt is s/p LVAD HM3 3/8, revision of outflow graft 3/9 chest closure 3/10/18.    Rehab Prognosis:  good; patient would benefit from acute skilled PT services to address these deficits and reach maximum level of function.      Recent Surgery: Procedure(s) (LRB):  CLOSURE-STERNAL WOUND (N/A)  PLACEMENT-NEOPERICARDIUM 4 Days Post-Op    Plan:     During this hospitalization, patient to be seen 6 x/week to address the above listed problems via gait training, therapeutic activities, therapeutic exercises  · Plan of Care Expires:  04/11/18   Plan of Care Reviewed with: patient, spouse    Subjective     Communicated with nurse prior to session.  Patient found sitting in bedside chair upon PT entry to room, agreeable to treatment.      Chief Complaint: pt c/o pain in abdomen and of being tired.   Patient comments/goals:  To get better and go home.   Pain/Comfort:  · Pain Rating 1: 3/10  · Location 1:  (abdomen)  · Pain Rating Post-Intervention 1: 3/10    Patients cultural, spiritual, Lutheran conflicts given the current situation: none    Objective:     Patient found with: telemetry, arterial line, blood pressure cuff, chest tube, PICC line, LVAD (CVP)     General  Precautions: Standard, fall, LVAD, sternal   Orthopedic Precautions:N/A   Braces:       Functional Mobility:  Bed mobility: pt was min assist and verbal cues for sit to supine in bed.     · Transfers:     · Sit to Stand:  contact guard assistance with no AD pt stood x 3 reps.   ·   · Gait: 32 ft x 2 reps with CGA. pt had O2 intact and RN was present with portable monitor. Emergency bag was present. Pt had sitting rest period between distance ambulated and was additional assist of 1 to follow with chair. Pt had B shuffling steps with gait training.     Pt was min verbal cues and SBA switching from LVAD battery to power base.       AM-PAC 6 CLICK MOBILITY  Turning over in bed (including adjusting bedclothes, sheets and blankets)?: 3  Sitting down on and standing up from a chair with arms (e.g., wheelchair, bedside commode, etc.): 3  Moving from lying on back to sitting on the side of the bed?: 3  Moving to and from a bed to a chair (including a wheelchair)?: 3  Need to walk in hospital room?: 2  Climbing 3-5 steps with a railing?: 1  Total Score: 15         Patient left supine with all lines intact, call button in reach and wife present..    GOALS:    Physical Therapy Goals        Problem: Physical Therapy Goal    Goal Priority Disciplines Outcome Goal Variances Interventions   Physical Therapy Goal     PT/OT, PT Ongoing (interventions implemented as appropriate)     Description:  Goals to be met by: 3/26     Patient will increase functional independence with mobility by performin. Supine to sit with Stand-by Assistance-not met  2. Sit to supine with Stand-by Assistance-not met  3. Sit to stand transfer with Stand-by Assistance -not met  4. Gait  x 100 feet with Stand-by Assistance -not met                      Time Tracking:     PT Received On: 18  PT Start Time: 1008     PT Stop Time: 1034  PT Total Time (min): 26 min     Billable Minutes: Gait Training 15 min and Therapeutic Activity 11  min    Treatment Type: Treatment  PT/PTA: PT     PTA Visit Number: 0     Nayana Alvarez, PT  03/14/2018

## 2018-03-14 NOTE — ASSESSMENT & PLAN NOTE
Neuro:   -off sedation  -pain controlled with oxycodone     Pulmonary:   - extubated 3/11  - Scheduled duo-nebs  - Nitric. Wean per CTS  - R side PTX first noted after extubation 3/11, CXR 3/12 showed increased size, went to IR, IR saw smaller PTX, chest tube canceled, 3/13 PTX smaller in size on CXR.      Cardiac:  - s/p LVAD 3/8/18, outflow tract revision on 3/9, and chest closure 3/10  - Pressors: Epi, dobutamine  - Wean pressors per CTS  - Amiodarone transitioned to PO     Renal:   - CKD; baseline Cr. 1.7  - WAGNER on 3/8 and 3/11, improving  - Lasix drip  - Monitor UOP     Infectious Disease:   -Leukocytosis on 3/8 after initial surgery, improved, stable at 14, trend WBC  -Low grade fevers on 3/8 after initial surgery, torsten to >38C on 3/10  -Blood cultures 3/6 NG, 3/11 NGTD  -vancomycin and doxycycline on 3/8  -cefazolin for surgery 3/9 and 3/10  -fluconazole, cefepime, bacitracin IM started 3/8, continuing     Hematology/Oncology:  - Trend CBC, H/H slowly trending down, transfuse per CTS  - iron supplmentation  - On heparin drip, started warfarin 3/12     Endocrine:  - Insuline gtt  - Endocrine on board     Fluids/Electrolytes/Nutrition/GI:   - tolerating PO intake, advance as tolerated  - Trend CMP  - Replace Lytes PRN      Dispo:  Continue SICU care. CTS primary.

## 2018-03-15 LAB
ALBUMIN SERPL BCP-MCNC: 2.9 G/DL
ALP SERPL-CCNC: 174 U/L
ALT SERPL W/O P-5'-P-CCNC: 38 U/L
ANION GAP SERPL CALC-SCNC: 10 MMOL/L
ANION GAP SERPL CALC-SCNC: 12 MMOL/L
APTT BLDCRRT: 23.2 SEC
APTT BLDCRRT: 31.4 SEC
APTT BLDCRRT: 54.8 SEC
AST SERPL-CCNC: 58 U/L
BASOPHILS # BLD AUTO: 0.03 K/UL
BASOPHILS NFR BLD: 0.2 %
BILIRUB SERPL-MCNC: 3.8 MG/DL
BUN SERPL-MCNC: 37 MG/DL
BUN SERPL-MCNC: 37 MG/DL
CALCIUM SERPL-MCNC: 10.2 MG/DL
CALCIUM SERPL-MCNC: 9.9 MG/DL
CHLORIDE SERPL-SCNC: 91 MMOL/L
CHLORIDE SERPL-SCNC: 93 MMOL/L
CO2 SERPL-SCNC: 32 MMOL/L
CO2 SERPL-SCNC: 34 MMOL/L
CREAT SERPL-MCNC: 1.8 MG/DL
CREAT SERPL-MCNC: 1.9 MG/DL
DIFFERENTIAL METHOD: ABNORMAL
EOSINOPHIL # BLD AUTO: 0.2 K/UL
EOSINOPHIL NFR BLD: 1 %
ERYTHROCYTE [DISTWIDTH] IN BLOOD BY AUTOMATED COUNT: 16.2 %
EST. GFR  (AFRICAN AMERICAN): 46.2 ML/MIN/1.73 M^2
EST. GFR  (AFRICAN AMERICAN): 49.3 ML/MIN/1.73 M^2
EST. GFR  (NON AFRICAN AMERICAN): 39.9 ML/MIN/1.73 M^2
EST. GFR  (NON AFRICAN AMERICAN): 42.6 ML/MIN/1.73 M^2
FACT X PPP CHRO-ACNC: 0.19 IU/ML
FACT X PPP CHRO-ACNC: 0.2 IU/ML
FACT X PPP CHRO-ACNC: 0.5 IU/ML
FACT X PPP CHRO-ACNC: <0.1 IU/ML
GLUCOSE SERPL-MCNC: 166 MG/DL
GLUCOSE SERPL-MCNC: 97 MG/DL
HCT VFR BLD AUTO: 23.3 %
HGB BLD-MCNC: 7.6 G/DL
IMM GRANULOCYTES # BLD AUTO: 0.76 K/UL
IMM GRANULOCYTES NFR BLD AUTO: 4.4 %
INR PPP: 1.1
LDH SERPL L TO P-CCNC: 401 U/L
LYMPHOCYTES # BLD AUTO: 1.2 K/UL
LYMPHOCYTES NFR BLD: 6.7 %
MAGNESIUM SERPL-MCNC: 2.1 MG/DL
MAGNESIUM SERPL-MCNC: 2.2 MG/DL
MCH RBC QN AUTO: 27.7 PG
MCHC RBC AUTO-ENTMCNC: 32.6 G/DL
MCV RBC AUTO: 85 FL
MONOCYTES # BLD AUTO: 1.6 K/UL
MONOCYTES NFR BLD: 9.3 %
NEUTROPHILS # BLD AUTO: 13.6 K/UL
NEUTROPHILS NFR BLD: 78.4 %
NRBC BLD-RTO: 1 /100 WBC
PHOSPHATE SERPL-MCNC: 3.3 MG/DL
PLATELET # BLD AUTO: 278 K/UL
PMV BLD AUTO: 11.2 FL
POCT GLUCOSE: 107 MG/DL (ref 70–110)
POCT GLUCOSE: 113 MG/DL (ref 70–110)
POCT GLUCOSE: 120 MG/DL (ref 70–110)
POCT GLUCOSE: 127 MG/DL (ref 70–110)
POTASSIUM SERPL-SCNC: 3.7 MMOL/L
POTASSIUM SERPL-SCNC: 3.8 MMOL/L
POTASSIUM SERPL-SCNC: 4 MMOL/L
POTASSIUM SERPL-SCNC: 4.2 MMOL/L
PROT SERPL-MCNC: 7.3 G/DL
PROTHROMBIN TIME: 11 SEC
RBC # BLD AUTO: 2.74 M/UL
SODIUM SERPL-SCNC: 135 MMOL/L
SODIUM SERPL-SCNC: 137 MMOL/L
WBC # BLD AUTO: 17.28 K/UL

## 2018-03-15 PROCEDURE — 63600175 PHARM REV CODE 636 W HCPCS: Performed by: THORACIC SURGERY (CARDIOTHORACIC VASCULAR SURGERY)

## 2018-03-15 PROCEDURE — 83735 ASSAY OF MAGNESIUM: CPT | Mod: 91

## 2018-03-15 PROCEDURE — 97116 GAIT TRAINING THERAPY: CPT

## 2018-03-15 PROCEDURE — 83615 LACTATE (LD) (LDH) ENZYME: CPT

## 2018-03-15 PROCEDURE — A4216 STERILE WATER/SALINE, 10 ML: HCPCS | Performed by: THORACIC SURGERY (CARDIOTHORACIC VASCULAR SURGERY)

## 2018-03-15 PROCEDURE — 94761 N-INVAS EAR/PLS OXIMETRY MLT: CPT

## 2018-03-15 PROCEDURE — 85520 HEPARIN ASSAY: CPT | Mod: 91

## 2018-03-15 PROCEDURE — 20000000 HC ICU ROOM

## 2018-03-15 PROCEDURE — 97530 THERAPEUTIC ACTIVITIES: CPT

## 2018-03-15 PROCEDURE — 99233 SBSQ HOSP IP/OBS HIGH 50: CPT | Mod: ,,, | Performed by: SURGERY

## 2018-03-15 PROCEDURE — 99232 SBSQ HOSP IP/OBS MODERATE 35: CPT | Mod: ,,, | Performed by: INTERNAL MEDICINE

## 2018-03-15 PROCEDURE — 87040 BLOOD CULTURE FOR BACTERIA: CPT

## 2018-03-15 PROCEDURE — 84132 ASSAY OF SERUM POTASSIUM: CPT | Mod: 91

## 2018-03-15 PROCEDURE — 63600175 PHARM REV CODE 636 W HCPCS: Performed by: STUDENT IN AN ORGANIZED HEALTH CARE EDUCATION/TRAINING PROGRAM

## 2018-03-15 PROCEDURE — 85730 THROMBOPLASTIN TIME PARTIAL: CPT | Mod: 91

## 2018-03-15 PROCEDURE — 85730 THROMBOPLASTIN TIME PARTIAL: CPT

## 2018-03-15 PROCEDURE — 80053 COMPREHEN METABOLIC PANEL: CPT

## 2018-03-15 PROCEDURE — 80048 BASIC METABOLIC PNL TOTAL CA: CPT

## 2018-03-15 PROCEDURE — 27000248 HC VAD-ADDITIONAL DAY

## 2018-03-15 PROCEDURE — 93750 INTERROGATION VAD IN PERSON: CPT | Mod: ,,, | Performed by: INTERNAL MEDICINE

## 2018-03-15 PROCEDURE — 25000003 PHARM REV CODE 250: Performed by: THORACIC SURGERY (CARDIOTHORACIC VASCULAR SURGERY)

## 2018-03-15 PROCEDURE — 84100 ASSAY OF PHOSPHORUS: CPT

## 2018-03-15 PROCEDURE — 84132 ASSAY OF SERUM POTASSIUM: CPT

## 2018-03-15 PROCEDURE — 87086 URINE CULTURE/COLONY COUNT: CPT

## 2018-03-15 PROCEDURE — 84134 ASSAY OF PREALBUMIN: CPT

## 2018-03-15 PROCEDURE — 97535 SELF CARE MNGMENT TRAINING: CPT

## 2018-03-15 PROCEDURE — 85025 COMPLETE CBC W/AUTO DIFF WBC: CPT

## 2018-03-15 PROCEDURE — 85610 PROTHROMBIN TIME: CPT

## 2018-03-15 PROCEDURE — 25000003 PHARM REV CODE 250: Performed by: STUDENT IN AN ORGANIZED HEALTH CARE EDUCATION/TRAINING PROGRAM

## 2018-03-15 RX ORDER — AMIODARONE HYDROCHLORIDE 200 MG/1
200 TABLET ORAL DAILY
Status: DISCONTINUED | OUTPATIENT
Start: 2018-03-16 | End: 2018-03-26 | Stop reason: HOSPADM

## 2018-03-15 RX ORDER — POTASSIUM CHLORIDE 20 MEQ/1
40 TABLET, EXTENDED RELEASE ORAL ONCE
Status: COMPLETED | OUTPATIENT
Start: 2018-03-15 | End: 2018-03-15

## 2018-03-15 RX ORDER — WARFARIN 7.5 MG/1
7.5 TABLET ORAL DAILY
Status: DISCONTINUED | OUTPATIENT
Start: 2018-03-15 | End: 2018-03-23

## 2018-03-15 RX ADMIN — OXYCODONE HYDROCHLORIDE 10 MG: 5 TABLET ORAL at 10:03

## 2018-03-15 RX ADMIN — Medication 3 ML: at 02:03

## 2018-03-15 RX ADMIN — OXYCODONE HYDROCHLORIDE 10 MG: 5 TABLET ORAL at 09:03

## 2018-03-15 RX ADMIN — Medication 10 ML: at 12:03

## 2018-03-15 RX ADMIN — POTASSIUM CHLORIDE 40 MEQ: 1500 TABLET, EXTENDED RELEASE ORAL at 08:03

## 2018-03-15 RX ADMIN — FUROSEMIDE: 10 INJECTION, SOLUTION INTRAMUSCULAR; INTRAVENOUS at 10:03

## 2018-03-15 RX ADMIN — PANTOPRAZOLE SODIUM 40 MG: 40 TABLET, DELAYED RELEASE ORAL at 08:03

## 2018-03-15 RX ADMIN — Medication 10 ML: at 06:03

## 2018-03-15 RX ADMIN — DOCUSATE SODIUM 200 MG: 100 CAPSULE, LIQUID FILLED ORAL at 09:03

## 2018-03-15 RX ADMIN — AMIODARONE HYDROCHLORIDE 400 MG: 200 TABLET ORAL at 08:03

## 2018-03-15 RX ADMIN — Medication 3 ML: at 06:03

## 2018-03-15 RX ADMIN — HEPARIN SODIUM 2100 UNITS/HR: 10000 INJECTION, SOLUTION INTRAVENOUS at 08:03

## 2018-03-15 RX ADMIN — WARFARIN SODIUM 7.5 MG: 2.5 TABLET ORAL at 05:03

## 2018-03-15 RX ADMIN — DOBUTAMINE IN DEXTROSE 4 MCG/KG/MIN: 200 INJECTION, SOLUTION INTRAVENOUS at 01:03

## 2018-03-15 RX ADMIN — Medication 81 MG: at 08:03

## 2018-03-15 RX ADMIN — POTASSIUM CHLORIDE 40 MEQ: 1500 TABLET, EXTENDED RELEASE ORAL at 02:03

## 2018-03-15 RX ADMIN — FERROUS GLUCONATE TAB 324 MG (37.5 MG ELEMENTAL IRON) 324 MG: 324 (37.5 FE) TAB at 08:03

## 2018-03-15 RX ADMIN — POTASSIUM CHLORIDE 40 MEQ: 1500 TABLET, EXTENDED RELEASE ORAL at 09:03

## 2018-03-15 RX ADMIN — HEPARIN SODIUM 2300 UNITS/HR: 10000 INJECTION, SOLUTION INTRAVENOUS at 06:03

## 2018-03-15 NOTE — PROGRESS NOTES
[facility]  Cardiothoracic Surgery  Progress Note    Patient Name: Tim Richards  MRN: [id number]  Admission Date: 3/1/2018  Hospital Length of Stay: 14 days  Code Status: Full Code   Attending Physician: Yg Kaufman MD   Referring Provider: Amy Rhodes MD  Principal Problem:LVAD (left ventricular assist device) present    Subjective:     Post-Op Info:  Procedure(s) (LRB):  CLOSURE-STERNAL WOUND (N/A)  PLACEMENT-NEOPERICARDIUM   5 Days Post-Op     Subjective:     Post-Op Info:  Procedure(s) (LRB):  CLOSURE-STERNAL WOUND (N/A)  PLACEMENT-NEOPERICARDIUM   5 Days Post-Op       Interval History:   Echo and liver US for elevated tbili. Weaned off epi.     Medications:  Continuous Infusions:   DOBUTamine 4 mcg/kg/min (03/15/18 0600)    epinephrine infusion Stopped (03/14/18 2100)    custom IV infusion builder (for pharmacist use only) 15 mL/hr at 03/15/18 0600    heparin (porcine) in 5 % dex 2,300 Units/hr (03/15/18 0649)     Scheduled Meds:   amiodarone  400 mg Oral BID    docusate sodium  200 mg Oral QHS    ferrous gluconate  324 mg Oral Daily with breakfast    INV aspirin/placebo  81 mg Oral Daily    pantoprazole  40 mg Oral Daily    polyethylene glycol  17 g Oral Daily    potassium chloride SA  40 mEq Oral BID    sodium chloride 0.9%  10 mL Intravenous Q6H    sodium chloride 0.9%  3 mL Intravenous Q8H    warfarin  6 mg Oral Daily     PRN Meds:acetaminophen, albumin human 5%, albuterol sulfate, bisacodyl, dextrose 50%, dextrose 50%, glucagon (human recombinant), glucose, glucose, insulin aspart U-100, magnesium hydroxide 400 mg/5 ml, oxyCODONE, oxyCODONE, Flushing PICC Protocol **AND** sodium chloride 0.9% **AND** sodium chloride 0.9%     Objective:     Vital Signs (Most Recent):  Temp: 99.1 °F (37.3 °C) (03/15/18 0300)  Pulse: 92 (03/15/18 0800)  Resp: (!) 22 (03/15/18 0800)  BP: 108/76 (03/15/18 0400)  SpO2: 100 % (03/15/18 0800) Vital Signs (24h Range):  Temp:  [98.5 °F  (36.9 °C)-99.1 °F (37.3 °C)] 99.1 °F (37.3 °C)  Pulse:  [80-96] 92  Resp:  [16-32] 22  SpO2:  [92 %-100 %] 100 %  BP: ()/(56-76) 108/76  Arterial Line BP: ()/(55-92) 80/55       Intake/Output Summary (Last 24 hours) at 03/15/18 0823  Last data filed at 03/15/18 0500   Gross per 24 hour   Intake             2022 ml   Output             3415 ml   Net            -1393 ml       Physical Exam   Constitutional: He is oriented to person, place, and time. He appears well-developed and well-nourished. No distress.   Cardiovascular: Normal rate.    Mechanical hum   Pulmonary/Chest: Effort normal. No respiratory distress.   Abdominal: Soft. He exhibits no distension.   Neurological: He is alert and oriented to person, place, and time.   Skin: Skin is warm and dry.   Psychiatric: He has a normal mood and affect. His behavior is normal.       Significant Labs:  CBC:   Recent Labs  Lab 03/15/18  0430   WBC 17.28*   RBC 2.74*   HGB 7.6*   HCT 23.3*      MCV 85   MCH 27.7   MCHC 32.6     CMP:   Recent Labs  Lab 03/14/18  0440  03/15/18  0430     --  97   CALCIUM 9.7  --  9.9   ALBUMIN 2.6*  --   --    PROT 6.5  --   --      --  137   K 4.2  < > 4.0  4.0   CO2 31*  --  34*   CL 96  --  93*   BUN 37*  --  37*   CREATININE 1.8*  --  1.8*   ALKPHOS 120  --   --    ALT 29  --   --    AST 53*  --   --    BILITOT 3.7*  --   --    < > = values in this interval not displayed.  Coagulation:   Recent Labs  Lab 03/15/18  0210 03/15/18  0430   INR  --  1.1   APTT 31.4  --        Significant Diagnostics:  CXR: I have reviewed all pertinent results/findings within the past 24 hours    Procedure: Device Interrogation Including analysis of device parameters  Current Settings: Ventricular Assist Device  Review of device function is stable  TXP LVAD INTERROGATIONS 3/15/2018 3/15/2018 3/15/2018 3/15/2018 3/15/2018 3/15/2018 3/15/2018   Type HeartMate II HeartMate II HeartMate II HeartMate II HeartMate II HeartMate II  HeartMate II   Flow 6.2 6 6.2 6.3 6.3 6.3 6.3   Speed 9000 9000 9000 9000 9000 9000 9000   PI 5.5 5.2 5 5.2 4.9 5.1 5   Power (Jackson) 5.8 5.8 5.8 5.8 5.8 5.9 5.8   LSL - - - 8600 - - -   Pulsatility - - - Pulse - - -     Assessment/Plan:     * LVAD (left ventricular assist device) present    Neuro:   - Pain mgmt: oxycodone PRN     Pulmonary:   - Wean supplemental oxygen   - Frequent IS  - Daily chest xray     Cardiac:   - Drips: dobutamine 4, epi off  - No aspirin- Prevent II trial   - PO amiodarone  - TTE showed moderately decreased RV function     Renal:    - UOP 3.4L  - BUN/Cr stable  - Lasix gtt at 7.5     Hepatic:  Liver US showed hepatomegaly but no acute process to explain rise in tbili     ID:   - Cultures NGTD     Hem/Onc:   - Monitor hgb; stable  - Heparin gtt, PTT goal 40-50  - Coumadin 7mg tonight     Endocrine:    - Insulin subq  - Endocrine following      Fluids/Electrolytes/Nutrition/GI:   - Replace electrolytes PRN per protocol  - Cardiac diet, 1500ml fluid restriction    DISPO:  - Continue SICU care          Acute decompensated heart failure    S/p LVAD             Vidhi Nicolas MD  Cardiothoracic Surgery  [facility]

## 2018-03-15 NOTE — PROCEDURES
Patient awake with family at bedside. VAD interrogation completed this AM in the event changes needed to be made. Will continue to monitor for further issues.     Pulsatile: Yes, intermittent   VAD Sounds: Smooth  Problems / Issues / Alarms with VAD if any: None noted      VAD Interrogation:  TXP LVAD INTERROGATIONS 3/15/2018 3/15/2018 3/15/2018 3/15/2018 3/15/2018 3/15/2018 3/15/2018   Type HeartMate II HeartMate II HeartMate II HeartMate II HeartMate II HeartMate II HeartMate II   Flow 6.2 6.1 6.1 6.1 5.8 6.2 6.3   Speed 9000 9000 9000 9000 9000 9000 9000   PI 5.1 5.4 5.7 5.6 5.8 5.4 5.4   Power (Jackson) 6 5.8 5.7 5.8 5.6 5.7 5.9   LSL 8600 8600 8600 8600 8600 8600 8600   Pulsatility Pulse Pulse Pulse Pulse Pulse Pulse Pulse   }

## 2018-03-15 NOTE — PLAN OF CARE
Problem: Occupational Therapy Goal  Goal: Occupational Therapy Goal  Goals to be met by: 3/26/18     Patient will increase functional independence with ADLs by performing:    Feeding with [place].  UE Dressing with Supervision.  LE Dressing with Supervision.  Grooming while standing at sink with Supervision.  Toileting from toilet with Supervision for hygiene and clothing management.   Toilet transfer to toilet with Supervision.  Pt will be (I) with VAD management during ADL.     Outcome: Ongoing (interventions implemented as appropriate)  The above goals remain appropriate. CLIFF Garcia  3/15/2018

## 2018-03-15 NOTE — PLAN OF CARE
Problem: Patient Care Overview  Goal: Plan of Care Review  Outcome: Ongoing (interventions implemented as appropriate)  Pt AAOx4, on 2L NC, O2 sats 100%. Epi turned off @ 21:00. Dobutamine @ 4, lasix @ 15, heparin @ 2300. Tmax 99.1F, VSS. Ptts drawn Q6H, heparin titrated according to MD order. Electrolytes monitored and replaced. Pt and wife updated on plan of care throughout shift. See flow sheet for assessment and details.

## 2018-03-15 NOTE — PROGRESS NOTES
Patient AAOX3, sitting up in chair with wife at bedside. VAD interrogation completed this AM in the event changes needed to be made. Will continue to monitor for further issues.   Wife is documenting numbers in binder daily, planning on trying dressing change for the first time today. Pt is reading VAD book. Reinforced to begin answering questions in workbook and practice changing power sources.     Pulsatile: Yes   VAD Sounds: Smooth  Problems / Issues / Alarms with VAD if any: None noted      VAD Interrogation:  TXP LVAD INTERROGATIONS 3/15/2018 3/15/2018 3/15/2018 3/15/2018 3/15/2018 3/15/2018 3/15/2018   Type HeartMate II HeartMate II HeartMate II HeartMate II HeartMate II HeartMate II HeartMate II   Flow 5.8 6.2 6.3 6.3 6.4 6.2 6   Speed 9000 9000 9000 9000 9000 9000 9000   PI 5.8 5.4 5.4 5.2 4.7 5.5 5.2   Power (Jackson) 5.6 5.7 5.9 5.8 6 5.8 5.8   LSL 8600 8600 8600 8600 8600 - -   Pulsatility Pulse Pulse Pulse Pulse Pulse - -   }

## 2018-03-15 NOTE — PROGRESS NOTES
RN performed sterile VAD dressing change with spouse at bedside. Spouse practiced donning sterile gloves and verbally told RN how to change dressing correctly. Incision site CDI with no drainage or other s/s of infection.

## 2018-03-15 NOTE — ASSESSMENT & PLAN NOTE
Neuro:   - Pain mgmt: oxycodone PRN     Pulmonary:   - Wean supplemental oxygen   - Frequent IS  - Daily chest xray     Cardiac:   - Drips: dobutamine 4, epi off  - No aspirin- Prevent II trial   - PO amiodarone  - TTE showed moderately decreased RV function     Renal:    - UOP 3.4L  - BUN/Cr stable  - Lasix gtt at 7.5     Hepatic:  Liver US showed hepatomegaly but no acute process to explain rise in tbili     ID:   - Cultures NGTD     Hem/Onc:   - Monitor hgb; stable  - Heparin gtt, PTT goal 40-50  - Coumadin 7mg tonight     Endocrine:    - Insulin subq  - Endocrine following      Fluids/Electrolytes/Nutrition/GI:   - Replace electrolytes PRN per protocol  - Cardiac diet, 1500ml fluid restriction    DISPO:  - Continue SICU care

## 2018-03-15 NOTE — PT/OT/SLP PROGRESS
"Physical Therapy Treatment    Patient Name:  Tim Richards   MRN:  [id number]    Recommendations:     Discharge Recommendations:  home   Discharge Equipment Recommendations: none   Barriers to discharge: None    Assessment:     Tim Richards is a 52 y.o. male admitted with a medical diagnosis of LVAD (left ventricular assist device) present.  He presents with the following impairments/functional limitations:  weakness, impaired endurance, gait instability, impaired functional mobilty, impaired balance .    Rehab Prognosis:  good; patient would benefit from acute skilled PT services to address these deficits and reach maximum level of function.      Recent Surgery: Procedure(s) (LRB):  CLOSURE-STERNAL WOUND (N/A)  PLACEMENT-NEOPERICARDIUM 5 Days Post-Op    Plan:     During this hospitalization, patient to be seen 6 x/week to address the above listed problems via gait training, therapeutic exercises  · Plan of Care Expires:  04/11/18   Plan of Care Reviewed with: patient    Subjective     Communicated with RN prior to session.  Patient found seated upon PT entry to room, agreeable to treatment.      Chief Complaint: "Give me a minute."  Pain/Comfort:  · Pain Rating 1: 0/10    Patients cultural, spiritual, Rastafarian conflicts given the current situation: none    Objective:     Patient found with:  (tele, pulse ox, Bp cuff, LVAD, PICC line)     General Precautions: Standard,  (fall, sternal, LVAD)   Orthopedic Precautions:N/A   Braces: N/A     Functional Mobility:  · Transfers:     · Sit to Stand:  minimum assistance with no AD  · Gait: 110 feet x 2 trials with CGA and HHA with seated rest break in between trials; monitor and chair in tow  · Balance: good      AM-PAC 6 CLICK MOBILITY  Turning over in bed (including adjusting bedclothes, sheets and blankets)?: 3  Sitting down on and standing up from a chair with arms (e.g., wheelchair, bedside commode, etc.): 3  Moving from lying on back to sitting on the side " of the bed?: 3  Moving to and from a bed to a chair (including a wheelchair)?: 3  Need to walk in hospital room?: 2  Climbing 3-5 steps with a railing?: 1  Total Score: 15     Therapeutic Activities and Exercises:  Pt transferred LVAD from PBU to [facility] with SBA and min cueing for identification of parts and equipment.    Patient left up in chair with all lines intact and call button in reach..    GOALS:    Physical Therapy Goals        Problem: Physical Therapy Goal    Goal Priority Disciplines Outcome Goal Variances Interventions   Physical Therapy Goal     PT/OT, PT Ongoing (interventions implemented as appropriate)     Description:  Goals to be met by: 3/26     Patient will increase functional independence with mobility by performin. Supine to sit with Stand-by Assistance-not met  2. Sit to supine with Stand-by Assistance-not met  3. Sit to stand transfer with Stand-by Assistance -not met  4. Gait  x 100 feet with Stand-by Assistance -not met                      Time Tracking:     PT Received On: 03/15/18  PT Start Time: 0930     PT Stop Time: 1000  PT Total Time (min): 30 min     Billable Minutes: Gait Training 15 and Therapeutic Activity 10    Treatment Type: Treatment  PT/PTA: PT     PTA Visit Number: 0     Teresa Barton, PT  03/15/2018

## 2018-03-15 NOTE — SUBJECTIVE & OBJECTIVE
Interval History/Significant Events: NAEON, AFVSS, epi off since 3/14 2100.  Cr stable at 1.8.    Follow-up For: Procedure(s) (LRB):  CLOSURE-STERNAL WOUND (N/A)  PLACEMENT-NEOPERICARDIUM    Post-Operative Day: 5 Days Post-Op    Objective:     Vital Signs (Most Recent):  Temp: 99.1 °F (37.3 °C) (03/15/18 0300)  Pulse: 82 (03/15/18 0500)  Resp: (!) 22 (03/15/18 0500)  BP: 108/76 (03/15/18 0400)  SpO2: 100 % (03/15/18 0500) Vital Signs (24h Range):  Temp:  [98.5 °F (36.9 °C)-99.1 °F (37.3 °C)] 99.1 °F (37.3 °C)  Pulse:  [] 82  Resp:  [11-32] 22  SpO2:  [92 %-100 %] 100 %  BP: ()/(56-76) 108/76  Arterial Line BP: ()/(57-92) 79/60     Weight: 124.6 kg (274 lb 11.1 oz)  Body mass index is 36.24 kg/m².      Intake/Output Summary (Last 24 hours) at 03/15/18 0533  Last data filed at 03/15/18 0300   Gross per 24 hour   Intake             1454 ml   Output             3455 ml   Net            -2001 ml       Physical Exam   Constitutional: He is oriented to person, place, and time. He appears well-developed and well-nourished.   HENT:   Head: Normocephalic and atraumatic.   Eyes: EOM are normal. Pupils are equal, round, and reactive to light.   Neck: Neck supple.   Cardiovascular:   Mechanical hum   Pulmonary/Chest: Breath sounds normal. No respiratory distress. He has no wheezes.   Mediastinal chest tube x1 in place. SS output   Abdominal: Soft. He exhibits no distension. There is no tenderness.   Neurological: He is alert and oriented to person, place, and time.   Skin: Skin is warm and dry.       Vents:  Vent Mode: SIMV (03/11/18 0740)  Ventilator Initiated: Yes (03/08/18 1400)  Set Rate: 18 bmp (03/11/18 0740)  Vt Set: 550 mL (03/11/18 0740)  Pressure Support: 10 cmH20 (03/11/18 0740)  PEEP/CPAP: 5 cmH20 (03/11/18 0740)  Oxygen Concentration (%): 28 (03/14/18 2059)  Peak Airway Pressure: 26 cmH2O (03/11/18 0740)  Plateau Pressure: 0 cmH20 (03/11/18 0740)  Total Ve: 10.6 mL (03/11/18 0740)  F/VT Ratio<105  (RSBI): (!) 36.52 (03/11/18 0523)    Lines/Drains/Airways     Peripherally Inserted Central Catheter Line                 PICC Triple Lumen 03/13/18 1521 right basilic 1 day          Drain                 Chest Tube 03/08/18 1125 1 Right Mediastinal 32 Fr. 6 days          Arterial Line                 Arterial Line 03/08/18 0721 Left Radial 6 days          Line                 VAD 03/08/18 1124 Left ventricular assist device HeartMate II 6 days          Peripheral Intravenous Line                 Midline Catheter Insertion/Assessment  - Single Lumen 03/07/18 1130 Right cephalic vein (lateral side of arm) 18g x 8cm 7 days                Significant Labs:    CBC/Anemia Profile:    Recent Labs  Lab 03/14/18  0440 03/15/18  0430   WBC 15.32* 17.28*   HGB 7.0* 7.6*   HCT 21.8* 23.3*    278   MCV 84 85   RDW 15.6* 16.2*        Chemistries:    Recent Labs  Lab 03/14/18  0440  03/14/18  1800 03/15/18  0012 03/15/18  0430     --   --   --  137   K 4.2  < > 3.8 3.7 4.0  4.0   CL 96  --   --   --  93*   CO2 31*  --   --   --  34*   BUN 37*  --   --   --  37*   CREATININE 1.8*  --   --   --  1.8*   CALCIUM 9.7  --   --   --  9.9   ALBUMIN 2.6*  --   --   --   --    PROT 6.5  --   --   --   --    BILITOT 3.7*  --   --   --   --    ALKPHOS 120  --   --   --   --    ALT 29  --   --   --   --    AST 53*  --   --   --   --    MG 2.1  < > 1.9 2.2 2.1   PHOS 2.2*  --   --   --  3.3   < > = values in this interval not displayed.    All pertinent labs within the past 24 hours have been reviewed.    Significant Imaging:  I have reviewed and interpreted all pertinent imaging results/findings within the past 24 hours.

## 2018-03-15 NOTE — PLAN OF CARE
Problem: Physical Therapy Goal  Goal: Physical Therapy Goal  Goals to be met by: 3/26     Patient will increase functional independence with mobility by performin. Supine to sit with Stand-by Assistance-not met  2. Sit to supine with Stand-by Assistance-not met  3. Sit to stand transfer with Stand-by Assistance -not met  4. Gait  x 100 feet with Stand-by Assistance -not met     Outcome: Ongoing (interventions implemented as appropriate)  Goals remain appropriate.  Teresa Barton, PT  3/15/2018

## 2018-03-15 NOTE — OP NOTE
DATE OF PROCEDURE:  03/10/2018.    PREOPERATIVE DIAGNOSES:  1.  Status post left ventricular assist device placement.  2.  Open chest.    POSTOPERATIVE DIAGNOSES:  1.  Status post left ventricular assist device placement.  2.  Open chest.    OPERATIONS PERFORMED:  1.  Washout of the mediastinum.  2.  Creation of moises-pericardium using [place] membrane.  3.  Sternal closure.    STAFF SURGEON:  Yg Kaufman M.D.    FIRST ASSISTANT:  Celia Morales.    ANESTHESIA:  GETA.    ESTIMATED BLOOD LOSS:  50 mL.    KEY FINDINGS OF THE OPERATION:  1.  Significant improvement in RV function.  2.  Significant improvement in diffuse coagulopathy.    INDICATIONS OF OPERATION:  This is a 52-year-old gentleman who underwent left   ventricular assist device placement on 03/08/2018.  Postoperatively, the   patient's chest was left open because of diffuse coagulopathy and RV dysfunction   and a decision was made to close the patient today.  Risks and benefits were   discussed with the family and the patient and an informed consent was obtained.    DESCRIPTION OF OPERATION:  The patient was brought to the Operating Room and   placed in a supine position.  After induction of anesthesia, area was prepped   and draped in the usual sterile fashion.  The previously placed temporary   sternal closure dressing was removed.  A chest retractor was placed.  Copious   amount of irrigation was used to irrigate the chest cavity.  Chest tubes were   irrigated and re-positioned.  The remnant portion of the outflow graft was split   open and was placed on the outflow graft as another layer of protection for the   time of transplant.  [place] membrane was used to create a moises-pericardium and   was attached to the cut edges of the pericardium to help in re-entry at the   time of transplant.  The sternum was then approximated by #6 stainless steel   wires.  Skin and subcutaneous tissues were closed in multiple layers.  Sterile   dressing was applied.   Terminal count of needles, sponges, and instrument was   found to be correct.    MEDICARE ATTESTATION:  Due to unavailability of an adequately trained   Cardiothoracic Surgery resident, I performed all parts of the operation.      LAUREL  dd: 03/15/2018 12:08:04 (CDT)  td: 03/15/2018 12:29:22 (CDT)  Doc ID   #[id number]  Job ID #[id number]    CC:

## 2018-03-15 NOTE — PLAN OF CARE
Problem: Patient Care Overview  Goal: Individualization & Mutuality  PMHx:CHF, DCM, CKD, gout, HTN, V. Tach, AICD, colonoscopy   1/12-1/17: txtmt for ADHF. RHC      3/1: Admitted for LVAD placement   3/8: LVAD placement  3/9: washout chest closure  3/12: 1 u PRBC  314: abdominal ultrasound, echo, oob to chair    APTT goal 40-50       Outcome: Ongoing (interventions implemented as appropriate)  Pt remained free from falls and injuries this shift. OOB to chair. Abdominal ultrasound and echo completed. VAD dressing changed. Room air, VSS. VAD profile stable. No acute events. First bowel movement since surgery. Plan to turn Epi off at 2100 tonight.     Skin note: skin remains intact to sacrum and heels.

## 2018-03-15 NOTE — PROGRESS NOTES
[facility]  Critical Care - Surgery  Progress Note    Patient Name: Tim Richards  MRN: [id number]  Admission Date: 3/1/2018  Hospital Length of Stay: 14 days  Code Status: Full Code  Attending Provider: Yg Kaufman MD  Primary Care Provider: Ja Méndez MD   Principal Problem: LVAD (left ventricular assist device) present    Subjective:     Hospital/ICU Course:  3/8 - LVAD placement  3/9 - revision of outflow graft, chest remains open  3/10 - chest closure  3/11 - pt extubated, PTX noted  3/12 - 1U PRBC transfused for hgb 6.7. Pt went to IR for chest tube for PTX, canceled due to improving PTX.  3/13 - epi down to 0.02    Interval History/Significant Events: NAEON, AFVSS, epi off since 3/14 2100.  Cr stable at 1.8.    Follow-up For: Procedure(s) (LRB):  CLOSURE-STERNAL WOUND (N/A)  PLACEMENT-NEOPERICARDIUM    Post-Operative Day: 5 Days Post-Op    Objective:     Vital Signs (Most Recent):  Temp: 99.1 °F (37.3 °C) (03/15/18 0300)  Pulse: 82 (03/15/18 0500)  Resp: (!) 22 (03/15/18 0500)  BP: 108/76 (03/15/18 0400)  SpO2: 100 % (03/15/18 0500) Vital Signs (24h Range):  Temp:  [98.5 °F (36.9 °C)-99.1 °F (37.3 °C)] 99.1 °F (37.3 °C)  Pulse:  [] 82  Resp:  [11-32] 22  SpO2:  [92 %-100 %] 100 %  BP: ()/(56-76) 108/76  Arterial Line BP: ()/(57-92) 79/60     Weight: 124.6 kg (274 lb 11.1 oz)  Body mass index is 36.24 kg/m².      Intake/Output Summary (Last 24 hours) at 03/15/18 0533  Last data filed at 03/15/18 0300   Gross per 24 hour   Intake             1454 ml   Output             3455 ml   Net            -2001 ml       Physical Exam   Constitutional: He is oriented to person, place, and time. He appears well-developed and well-nourished.   HENT:   Head: Normocephalic and atraumatic.   Eyes: EOM are normal. Pupils are equal, round, and reactive to light.   Neck: Neck supple.   Cardiovascular:   Mechanical hum   Pulmonary/Chest: Breath sounds normal. No respiratory  distress. He has no wheezes.   Mediastinal chest tube x1 in place. SS output   Abdominal: Soft. He exhibits no distension. There is no tenderness.   Neurological: He is alert and oriented to person, place, and time.   Skin: Skin is warm and dry.       Vents:  Vent Mode: SIMV (03/11/18 0740)  Ventilator Initiated: Yes (03/08/18 1400)  Set Rate: 18 bmp (03/11/18 0740)  Vt Set: 550 mL (03/11/18 0740)  Pressure Support: 10 cmH20 (03/11/18 0740)  PEEP/CPAP: 5 cmH20 (03/11/18 0740)  Oxygen Concentration (%): 28 (03/14/18 2059)  Peak Airway Pressure: 26 cmH2O (03/11/18 0740)  Plateau Pressure: 0 cmH20 (03/11/18 0740)  Total Ve: 10.6 mL (03/11/18 0740)  F/VT Ratio<105 (RSBI): (!) 36.52 (03/11/18 0523)    Lines/Drains/Airways     Peripherally Inserted Central Catheter Line                 PICC Triple Lumen 03/13/18 1521 right basilic 1 day          Drain                 Chest Tube 03/08/18 1125 1 Right Mediastinal 32 Fr. 6 days          Arterial Line                 Arterial Line 03/08/18 0721 Left Radial 6 days          Line                 VAD 03/08/18 1124 Left ventricular assist device HeartMate II 6 days          Peripheral Intravenous Line                 Midline Catheter Insertion/Assessment  - Single Lumen 03/07/18 1130 Right cephalic vein (lateral side of arm) 18g x 8cm 7 days                Significant Labs:    CBC/Anemia Profile:    Recent Labs  Lab 03/14/18  0440 03/15/18  0430   WBC 15.32* 17.28*   HGB 7.0* 7.6*   HCT 21.8* 23.3*    278   MCV 84 85   RDW 15.6* 16.2*        Chemistries:    Recent Labs  Lab 03/14/18  0440  03/14/18  1800 03/15/18  0012 03/15/18  0430     --   --   --  137   K 4.2  < > 3.8 3.7 4.0  4.0   CL 96  --   --   --  93*   CO2 31*  --   --   --  34*   BUN 37*  --   --   --  37*   CREATININE 1.8*  --   --   --  1.8*   CALCIUM 9.7  --   --   --  9.9   ALBUMIN 2.6*  --   --   --   --    PROT 6.5  --   --   --   --    BILITOT 3.7*  --   --   --   --    ALKPHOS 120  --   --   --    --    ALT 29  --   --   --   --    AST 53*  --   --   --   --    MG 2.1  < > 1.9 2.2 2.1   PHOS 2.2*  --   --   --  3.3   < > = values in this interval not displayed.    All pertinent labs within the past 24 hours have been reviewed.    Significant Imaging:  I have reviewed and interpreted all pertinent imaging results/findings within the past 24 hours.    Assessment/Plan:     Chronic systolic heart failure    Neuro:   -off sedation  -pain controlled with oxycodone     Pulmonary:   - extubated 3/11  - Scheduled duo-nebs  - Nitric weaned  - R side PTX first noted after extubation 3/11, CXR 3/12 showed increased size, went to IR, IR saw smaller PTX, chest tube canceled, 3/13 PTX smaller in size on CXR.      Cardiac:  - s/p LVAD 3/8/18, outflow tract revision on 3/9, and chest closure 3/10  - Pressors: dobutamine  - Epi weaned off, wean pressors per CTS  - Amiodarone transitioned to PO     Renal:   - CKD; baseline Cr. 1.7  - WAGNER on 3/8 and 3/11, improving  - Lasix drip  - Monitor UOP     Infectious Disease:   -Leukocytosis on 3/8 after initial surgery, improved, stable at 14, trend WBC  -Low grade fevers on 3/8 after initial surgery, torsten to >38C on 3/10  -Blood cultures 3/6 NG, 3/11 NGTD  -vancomycin and doxycycline on 3/8  -cefazolin for surgery 3/9 and 3/10  -fluconazole, cefepime, bacitracin IM started 3/8, continuing     Hematology/Oncology:  - Trend CBC, H/H slowly trending down, transfuse per CTS  - iron supplmentation  - On heparin drip, started warfarin 3/12     Endocrine:  - Insuline gtt  - Endocrine on board     Fluids/Electrolytes/Nutrition/GI:   - tolerating PO intake, advance as tolerated  - Trend CMP  - Replace Lytes PRN      Dispo:  Possible stepdown today. CTS primary.            Critical care was time spent personally by me on the following activities: development of treatment plan with patient or surrogate and bedside caregivers, discussions with consultants, evaluation of patient's response to  treatment, examination of patient, ordering and performing treatments and interventions, ordering and review of laboratory studies, ordering and review of radiographic studies, pulse oximetry, re-evaluation of patient's condition.  This critical care time did not overlap with that of any other provider or involve time for any procedures.     Fam Patel MD  Critical Care - Surgery  [facility]

## 2018-03-15 NOTE — PROGRESS NOTES
UPDATE    Pt presents in room with wife. Pt seated in chair, aaox4 with pleasant affect. Pt reports has been walking with PT and will move to floor soon. Pt and wife report coping well and deny further needs, questions, concerns at this time and none indicated. Providing ongoing psychosocial and counseling support, education, resources, assistance and discharge planning as indicated. Following and available.

## 2018-03-15 NOTE — SUBJECTIVE & OBJECTIVE
Interval History: patient with multiple bowel movements yesterday, now with resolution in abdominal pain     Continuous Infusions:   DOBUTamine 4 mcg/kg/min (03/15/18 0900)    epinephrine infusion Stopped (03/14/18 2100)    custom IV infusion builder (for pharmacist use only) 15 mL/hr at 03/15/18 1045    heparin (porcine) in 5 % dex 2,300 Units/hr (03/15/18 0900)     Scheduled Meds:   [START ON 3/16/2018] amiodarone  200 mg Oral Daily    docusate sodium  200 mg Oral QHS    ferrous gluconate  324 mg Oral Daily with breakfast    INV aspirin/placebo  81 mg Oral Daily    pantoprazole  40 mg Oral Daily    polyethylene glycol  17 g Oral Daily    potassium chloride SA  40 mEq Oral BID    sodium chloride 0.9%  10 mL Intravenous Q6H    sodium chloride 0.9%  3 mL Intravenous Q8H    warfarin  7.5 mg Oral Daily     PRN Meds:acetaminophen, albumin human 5%, albuterol sulfate, bisacodyl, dextrose 50%, dextrose 50%, glucagon (human recombinant), glucose, glucose, insulin aspart U-100, magnesium hydroxide 400 mg/5 ml, oxyCODONE, oxyCODONE, Flushing PICC Protocol **AND** sodium chloride 0.9% **AND** sodium chloride 0.9%    Review of patient's allergies indicates:   Allergen Reactions    Aspirin Other (See Comments)     Prevent II patient- Do NOT order aspirin. Please call Yenny [phone] if patient is admitted to hospital    Lisinopril Anaphylaxis     Objective:     Vital Signs (Most Recent):  Temp: 98.4 °F (36.9 °C) (03/15/18 1100)  Pulse: 93 (03/15/18 1100)  Resp: (!) 26 (03/15/18 1100)  BP: (!) 98/0 (03/15/18 0800)  SpO2: (!) 93 % (03/15/18 1100) Vital Signs (24h Range):  Temp:  [98.2 °F (36.8 °C)-99.1 °F (37.3 °C)] 98.4 °F (36.9 °C)  Pulse:  [80-93] 93  Resp:  [16-27] 26  SpO2:  [92 %-100 %] 93 %  BP: ()/(0-76) 98/0  Arterial Line BP: (69-94)/(48-75) 85/48     Patient Vitals for the past 72 hrs (Last 3 readings):   Weight   03/14/18 0500 124.6 kg (274 lb 11.1 oz)   03/12/18 1721 124.6 kg (274 lb 11.1 oz)      Body mass index is 36.24 kg/m².      Intake/Output Summary (Last 24 hours) at 03/15/18 1126  Last data filed at 03/15/18 1045   Gross per 24 hour   Intake             2142 ml   Output             3745 ml   Net            -1603 ml       Hemodynamic Parameters:         Physical Exam   Constitutional: He is oriented to person, place, and time. He appears well-developed and well-nourished. No distress.   Cardiovascular: Normal rate.    Mechanical hum   Pulmonary/Chest: Effort normal. No respiratory distress.   Abdominal: Soft. He exhibits no distension.   Neurological: He is alert and oriented to person, place, and time.   Skin: Skin is warm and dry.   Psychiatric: He has a normal mood and affect. His behavior is normal.       Significant Labs:  CBC:    Recent Labs  Lab 03/13/18  0253 03/14/18  0440 03/15/18  0430   WBC 13.49* 15.32* 17.28*   RBC 2.61* 2.61* 2.74*   HGB 7.2* 7.0* 7.6*   HCT 22.5* 21.8* 23.3*    219 278   MCV 86 84 85   MCH 27.6 26.8* 27.7   MCHC 32.0 32.1 32.6     BNP:    Recent Labs  Lab 03/09/18  0308 03/12/18  0506 03/14/18  0440   BNP 1,114* 1,419* 1,324*     CMP:    Recent Labs  Lab 03/12/18  0506  03/14/18  0440  03/15/18  0012 03/15/18  0430 03/15/18  0852   *  < > 104  --   --  97 166*   CALCIUM 9.4  < > 9.7  --   --  9.9 10.2   ALBUMIN 2.7*  --  2.6*  --   --   --  2.9*   PROT 6.3  --  6.5  --   --   --  7.3     < > 137  --   --  137 135*   K 3.9  3.9  < > 4.2  < > 3.7 4.0  4.0 3.8   CO2 27  < > 31*  --   --  34* 32*     < > 96  --   --  93* 91*   BUN 42*  < > 37*  --   --  37* 37*   CREATININE 2.2*  < > 1.8*  --   --  1.8* 1.9*   ALKPHOS 79  --  120  --   --   --  174*   ALT 21  --  29  --   --   --  38   AST 55*  --  53*  --   --   --  58*   BILITOT 3.3*  --  3.7*  --   --   --  3.8*   < > = values in this interval not displayed.   Coagulation:     Recent Labs  Lab 03/13/18  0253  03/14/18  0604  03/14/18  1400 03/14/18  1958 03/15/18  0210 03/15/18  0430   INR  1.0  --  0.9  --   --   --   --  1.1   APTT <21.0  < > 30.6  < > 43.1* 28.2 31.4  --    < > = values in this interval not displayed.  LDH:    Recent Labs  Lab 03/13/18  0253 03/14/18  0440 03/15/18  0430   * 411* 401*     Microbiology:  Microbiology Results (last 7 days)     Procedure Component Value Units Date/Time    Blood culture [[id number]] Collected:  03/11/18 0650    Order Status:  Completed Specimen:  Blood from Peripheral, Hand, Right Updated:  03/15/18 1012     Blood Culture, Routine No Growth to date     Blood Culture, Routine No Growth to date     Blood Culture, Routine No Growth to date     Blood Culture, Routine No Growth to date     Blood Culture, Routine No Growth to date    Blood culture [[id number]] Collected:  03/11/18 0651    Order Status:  Completed Specimen:  Blood from Peripheral, Wrist, Right Updated:  03/15/18 1012     Blood Culture, Routine No Growth to date     Blood Culture, Routine No Growth to date     Blood Culture, Routine No Growth to date     Blood Culture, Routine No Growth to date     Blood Culture, Routine No Growth to date    Culture, Respiratory with Gram Stain [[id number]] Collected:  03/11/18 0800    Order Status:  Completed Specimen:  Respiratory from Endotracheal Aspirate Updated:  03/14/18 1058     Respiratory Culture Further report to follow     Gram Stain (Respiratory) <10 epithelial cells per low power field.     Gram Stain (Respiratory) Few WBC's     Gram Stain (Respiratory) Rare Gram positive cocci     Gram Stain (Respiratory) Rare Gram positive rods    Blood culture [[id number]] Collected:  03/06/18 1107    Order Status:  Completed Specimen:  Blood Updated:  03/11/18 1612     Blood Culture, Routine No growth after 5 days.    Blood culture [[id number]] Collected:  03/06/18 1107    Order Status:  Completed Specimen:  Blood Updated:  03/11/18 1612     Blood Culture, Routine No growth after 5 days.    IV catheter culture [[id number]] Collected:  03/07/18 1151    Order  Status:  Completed Specimen:  Catheter Tip from Catheter Tip, Intrajugular Updated:  03/10/18 0659     Aerobic Culture - Cath tip No growth          I have reviewed all pertinent labs within the past 24 hours.    Estimated Creatinine Clearance: 63.6 mL/min (A) (based on SCr of 1.9 mg/dL (H)).    Diagnostic Results:  I have reviewed and interpreted all pertinent imaging results/findings within the past 24 hours.

## 2018-03-15 NOTE — SUBJECTIVE & OBJECTIVE
Subjective:     Post-Op Info:  Procedure(s) (LRB):  CLOSURE-STERNAL WOUND (N/A)  PLACEMENT-NEOPERICARDIUM   5 Days Post-Op       Interval History:       Medications:  Continuous Infusions:   DOBUTamine 4 mcg/kg/min (03/15/18 0600)    epinephrine infusion Stopped (03/14/18 2100)    custom IV infusion builder (for pharmacist use only) 15 mL/hr at 03/15/18 0600    heparin (porcine) in 5 % dex 2,300 Units/hr (03/15/18 0649)     Scheduled Meds:   amiodarone  400 mg Oral BID    docusate sodium  200 mg Oral QHS    ferrous gluconate  324 mg Oral Daily with breakfast    INV aspirin/placebo  81 mg Oral Daily    pantoprazole  40 mg Oral Daily    polyethylene glycol  17 g Oral Daily    potassium chloride SA  40 mEq Oral BID    sodium chloride 0.9%  10 mL Intravenous Q6H    sodium chloride 0.9%  3 mL Intravenous Q8H    warfarin  6 mg Oral Daily     PRN Meds:acetaminophen, albumin human 5%, albuterol sulfate, bisacodyl, dextrose 50%, dextrose 50%, glucagon (human recombinant), glucose, glucose, insulin aspart U-100, magnesium hydroxide 400 mg/5 ml, oxyCODONE, oxyCODONE, Flushing PICC Protocol **AND** sodium chloride 0.9% **AND** sodium chloride 0.9%     Objective:     Vital Signs (Most Recent):  Temp: 99.1 °F (37.3 °C) (03/15/18 0300)  Pulse: 92 (03/15/18 0800)  Resp: (!) 22 (03/15/18 0800)  BP: 108/76 (03/15/18 0400)  SpO2: 100 % (03/15/18 0800) Vital Signs (24h Range):  Temp:  [98.5 °F (36.9 °C)-99.1 °F (37.3 °C)] 99.1 °F (37.3 °C)  Pulse:  [80-96] 92  Resp:  [16-32] 22  SpO2:  [92 %-100 %] 100 %  BP: ()/(56-76) 108/76  Arterial Line BP: ()/(55-92) 80/55       Intake/Output Summary (Last 24 hours) at 03/15/18 0823  Last data filed at 03/15/18 0500   Gross per 24 hour   Intake             2022 ml   Output             3415 ml   Net            -1393 ml       Physical Exam   Constitutional: He is oriented to person, place, and time. He appears well-developed and well-nourished. No distress.    Cardiovascular: Normal rate.    Mechanical hum   Pulmonary/Chest: Effort normal. No respiratory distress.   Abdominal: Soft. He exhibits no distension.   Neurological: He is alert and oriented to person, place, and time.   Skin: Skin is warm and dry.   Psychiatric: He has a normal mood and affect. His behavior is normal.       Significant Labs:  CBC:   Recent Labs  Lab 03/15/18  0430   WBC 17.28*   RBC 2.74*   HGB 7.6*   HCT 23.3*      MCV 85   MCH 27.7   MCHC 32.6     CMP:   Recent Labs  Lab 03/14/18  0440  03/15/18  0430     --  97   CALCIUM 9.7  --  9.9   ALBUMIN 2.6*  --   --    PROT 6.5  --   --      --  137   K 4.2  < > 4.0  4.0   CO2 31*  --  34*   CL 96  --  93*   BUN 37*  --  37*   CREATININE 1.8*  --  1.8*   ALKPHOS 120  --   --    ALT 29  --   --    AST 53*  --   --    BILITOT 3.7*  --   --    < > = values in this interval not displayed.  Coagulation:   Recent Labs  Lab 03/15/18  0210 03/15/18  0430   INR  --  1.1   APTT 31.4  --        Significant Diagnostics:  CXR: I have reviewed all pertinent results/findings within the past 24 hours    Procedure: Device Interrogation Including analysis of device parameters  Current Settings: Ventricular Assist Device  Review of device function is stable  TXP LVAD INTERROGATIONS 3/15/2018 3/15/2018 3/15/2018 3/15/2018 3/15/2018 3/15/2018 3/15/2018   Type HeartMate II HeartMate II HeartMate II HeartMate II HeartMate II HeartMate II HeartMate II   Flow 6.2 6 6.2 6.3 6.3 6.3 6.3   Speed 9000 9000 9000 9000 9000 9000 9000   PI 5.5 5.2 5 5.2 4.9 5.1 5   Power ([place]) 5.8 5.8 5.8 5.8 5.8 5.9 5.8   LSL - - - 8600 - - -   Pulsatility - - - Pulse - - -

## 2018-03-15 NOTE — ASSESSMENT & PLAN NOTE
Neuro:   -off sedation  -pain controlled with oxycodone     Pulmonary:   - extubated 3/11  - Scheduled duo-nebs  - Nitric weaned  - R side PTX first noted after extubation 3/11, CXR 3/12 showed increased size, went to IR, IR saw smaller PTX, chest tube canceled, 3/13 PTX smaller in size on CXR.      Cardiac:  - s/p LVAD 3/8/18, outflow tract revision on 3/9, and chest closure 3/10  - Pressors: dobutamine  - Epi weaned off, wean pressors per CTS  - Amiodarone transitioned to PO     Renal:   - CKD; baseline Cr. 1.7  - WAGNER on 3/8 and 3/11, improving  - Lasix drip  - Monitor UOP     Infectious Disease:   -Leukocytosis on 3/8 after initial surgery, improved, stable at 14, trend WBC  -Low grade fevers on 3/8 after initial surgery, torsten to >38C on 3/10  -Blood cultures 3/6 NG, 3/11 NGTD  -vancomycin and doxycycline on 3/8  -cefazolin for surgery 3/9 and 3/10  -fluconazole, cefepime, bacitracin IM started 3/8, continuing     Hematology/Oncology:  - Trend CBC, H/H slowly trending down, transfuse per CTS  - iron supplmentation  - On heparin drip, started warfarin 3/12     Endocrine:  - Insuline gtt  - Endocrine on board     Fluids/Electrolytes/Nutrition/GI:   - tolerating PO intake, advance as tolerated  - Trend CMP  - Replace Lytes PRN      Dispo:  Possible stepdown today. CTS primary.

## 2018-03-15 NOTE — PROGRESS NOTES
[facility]  Heart Transplant  Progress Note    Patient Name: Tim Richards  MRN: [id number]  Admission Date: 3/1/2018  Hospital Length of Stay: 14 days  Attending Physician: Yg Kaufman MD  Primary Care Provider: Ja Méndez MD  Principal Problem:LVAD (left ventricular assist device) present    Subjective:     Interval History: patient with multiple bowel movements yesterday, now with resolution in abdominal pain     Continuous Infusions:   DOBUTamine 4 mcg/kg/min (03/15/18 0900)    epinephrine infusion Stopped (03/14/18 2100)    custom IV infusion builder (for pharmacist use only) 15 mL/hr at 03/15/18 1045    heparin (porcine) in 5 % dex 2,300 Units/hr (03/15/18 0900)     Scheduled Meds:   [START ON 3/16/2018] amiodarone  200 mg Oral Daily    docusate sodium  200 mg Oral QHS    ferrous gluconate  324 mg Oral Daily with breakfast    INV aspirin/placebo  81 mg Oral Daily    pantoprazole  40 mg Oral Daily    polyethylene glycol  17 g Oral Daily    potassium chloride SA  40 mEq Oral BID    sodium chloride 0.9%  10 mL Intravenous Q6H    sodium chloride 0.9%  3 mL Intravenous Q8H    warfarin  7.5 mg Oral Daily     PRN Meds:acetaminophen, albumin human 5%, albuterol sulfate, bisacodyl, dextrose 50%, dextrose 50%, glucagon (human recombinant), glucose, glucose, insulin aspart U-100, magnesium hydroxide 400 mg/5 ml, oxyCODONE, oxyCODONE, Flushing PICC Protocol **AND** sodium chloride 0.9% **AND** sodium chloride 0.9%    Review of patient's allergies indicates:   Allergen Reactions    Aspirin Other (See Comments)     Prevent II patient- Do NOT order aspirin. Please call Yenny [phone] if patient is admitted to hospital    Lisinopril Anaphylaxis     Objective:     Vital Signs (Most Recent):  Temp: 98.4 °F (36.9 °C) (03/15/18 1100)  Pulse: 93 (03/15/18 1100)  Resp: (!) 26 (03/15/18 1100)  BP: (!) 98/0 (03/15/18 0800)  SpO2: (!) 93 % (03/15/18 1100) Vital Signs (24h  Range):  Temp:  [98.2 °F (36.8 °C)-99.1 °F (37.3 °C)] 98.4 °F (36.9 °C)  Pulse:  [80-93] 93  Resp:  [16-27] 26  SpO2:  [92 %-100 %] 93 %  BP: ()/(0-76) 98/0  Arterial Line BP: (69-94)/(48-75) 85/48     Patient Vitals for the past 72 hrs (Last 3 readings):   Weight   03/14/18 0500 124.6 kg (274 lb 11.1 oz)   03/12/18 1721 124.6 kg (274 lb 11.1 oz)     Body mass index is 36.24 kg/m².      Intake/Output Summary (Last 24 hours) at 03/15/18 1126  Last data filed at 03/15/18 1045   Gross per 24 hour   Intake             2142 ml   Output             3745 ml   Net            -1603 ml       Hemodynamic Parameters:         Physical Exam   Constitutional: He is oriented to person, place, and time. He appears well-developed and well-nourished. No distress.   Cardiovascular: Normal rate.    Mechanical hum   Pulmonary/Chest: Effort normal. No respiratory distress.   Abdominal: Soft. He exhibits no distension.   Neurological: He is alert and oriented to person, place, and time.   Skin: Skin is warm and dry.   Psychiatric: He has a normal mood and affect. His behavior is normal.       Significant Labs:  CBC:    Recent Labs  Lab 03/13/18  0253 03/14/18  0440 03/15/18  0430   WBC 13.49* 15.32* 17.28*   RBC 2.61* 2.61* 2.74*   HGB 7.2* 7.0* 7.6*   HCT 22.5* 21.8* 23.3*    219 278   MCV 86 84 85   MCH 27.6 26.8* 27.7   MCHC 32.0 32.1 32.6     BNP:    Recent Labs  Lab 03/09/18  0308 03/12/18  0506 03/14/18  0440   BNP 1,114* 1,419* 1,324*     CMP:    Recent Labs  Lab 03/12/18  0506  03/14/18  0440  03/15/18  0012 03/15/18  0430 03/15/18  0852   *  < > 104  --   --  97 166*   CALCIUM 9.4  < > 9.7  --   --  9.9 10.2   ALBUMIN 2.7*  --  2.6*  --   --   --  2.9*   PROT 6.3  --  6.5  --   --   --  7.3     < > 137  --   --  137 135*   K 3.9  3.9  < > 4.2  < > 3.7 4.0  4.0 3.8   CO2 27  < > 31*  --   --  34* 32*     < > 96  --   --  93* 91*   BUN 42*  < > 37*  --   --  37* 37*   CREATININE 2.2*  < > 1.8*  --    --  1.8* 1.9*   ALKPHOS 79  --  120  --   --   --  174*   ALT 21  --  29  --   --   --  38   AST 55*  --  53*  --   --   --  58*   BILITOT 3.3*  --  3.7*  --   --   --  3.8*   < > = values in this interval not displayed.   Coagulation:     Recent Labs  Lab 03/13/18  0253  03/14/18  0604  03/14/18  1400 03/14/18  1958 03/15/18  0210 03/15/18  0430   INR 1.0  --  0.9  --   --   --   --  1.1   APTT <21.0  < > 30.6  < > 43.1* 28.2 31.4  --    < > = values in this interval not displayed.  LDH:    Recent Labs  Lab 03/13/18  0253 03/14/18  0440 03/15/18  0430   * 411* 401*     Microbiology:  Microbiology Results (last 7 days)     Procedure Component Value Units Date/Time    Blood culture [[id number]] Collected:  03/11/18 0650    Order Status:  Completed Specimen:  Blood from Peripheral, Hand, Right Updated:  03/15/18 1012     Blood Culture, Routine No Growth to date     Blood Culture, Routine No Growth to date     Blood Culture, Routine No Growth to date     Blood Culture, Routine No Growth to date     Blood Culture, Routine No Growth to date    Blood culture [[id number]] Collected:  03/11/18 0651    Order Status:  Completed Specimen:  Blood from Peripheral, Wrist, Right Updated:  03/15/18 1012     Blood Culture, Routine No Growth to date     Blood Culture, Routine No Growth to date     Blood Culture, Routine No Growth to date     Blood Culture, Routine No Growth to date     Blood Culture, Routine No Growth to date    Culture, Respiratory with Gram Stain [[id number]] Collected:  03/11/18 0800    Order Status:  Completed Specimen:  Respiratory from Endotracheal Aspirate Updated:  03/14/18 1058     Respiratory Culture Further report to follow     Gram Stain (Respiratory) <10 epithelial cells per low power field.     Gram Stain (Respiratory) Few WBC's     Gram Stain (Respiratory) Rare Gram positive cocci     Gram Stain (Respiratory) Rare Gram positive rods    Blood culture [[id number]] Collected:  03/06/18 1107    Order  Status:  Completed Specimen:  Blood Updated:  03/11/18 1612     Blood Culture, Routine No growth after 5 days.    Blood culture [[id number]] Collected:  03/06/18 1107    Order Status:  Completed Specimen:  Blood Updated:  03/11/18 1612     Blood Culture, Routine No growth after 5 days.    IV catheter culture [[id number]] Collected:  03/07/18 1151    Order Status:  Completed Specimen:  Catheter Tip from Catheter Tip, Intrajugular Updated:  03/10/18 0659     Aerobic Culture - Cath tip No growth          I have reviewed all pertinent labs within the past 24 hours.    Estimated Creatinine Clearance: 63.6 mL/min (A) (based on SCr of 1.9 mg/dL (H)).    Diagnostic Results:  I have reviewed and interpreted all pertinent imaging results/findings within the past 24 hours.    Assessment and Plan:     Mr. Richadrs is a 52 y.o. year old Black or  male who has presents as f/u from hospitalization for ADHF after presenting to clinic 1/10/17 as initial consult. advanced surgical options (LVAD/OHT).    This 53 yo BM has had CHF since 2011 at which time an angiogram did not demonstrate any CAD.  He is on amiodarone for VT. He was admitted from 1/12-1/17/18 (see d/c summary) where he was treated for ADHF and initiation of w/u for advanced options. RHC during that admission showed RA 17 and PCWP 35 as well as severely reduced CI 1.6. Though not optimized, he was discharged per his request so as not to lose his job/insurance--see Dr. Hadley's attestation for full details on d/c summary. Since d/c, his Scr has risen from 2.0 on discharge to 2.8 (1/23/18). His BNP had declined to 1040.  He presents today for scheduled hospital f/u.      Today, he reports feeling well. He says he has more energy than usual. His only complaints are cramping and xerosis. He denies n/v/d, no CP, palpitations or syncope. He has stable orthostatic dizziness/LH. No PND or orthopnea. His COLEMAN is improved from discharge and his weight is down about 5-7  pounds on his home scale. Of note, his Scr on labs from 2 days ago showed Scr 2.8 up from 2.0. T. Bili was down from 1.1 to 0.6 and BNP was down to 1040 from 1700.  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 No labs initially ordered but I ordered and sent him down.     * LVAD (left ventricular assist device) present    -HeartMate II Implanted 3/8/18 as DT with repositioning of outflow graft 3/9/18.  S/p chest closure 3/10/18  -CTS Primary  -Implanted by Dr. Kaufman  -Anticoagulation per Primary Coumadin  -Antiplatelets; Patient is enrolled in PREVENT II  -Speed set at 9000, LSL 8600 rpm  -Interrogation notable for no events  -Not listed for OHTx  - On           Pneumothorax    -IR unable to place chest tube or pig tail; monitor daily CXR        Anemia    -Transfused a unit PRBC's 3/12  -H/H stable        Hyperbilirubinemia    -likely secondary to above  -RUQ ultrasound done        CKD (chronic kidney disease), stage III    - cr stable  -Baseline creat 1.6-2.0.        PVT (paroxysmal ventricular tachycardia)    -has ICD  -Transitioned from IV Amiodarone infusion to po         Acute on chronic combined systolic and diastolic heart failure    -NICM   -EF: <10%; LVEDD: 9.7 cm  -now s/p HM II LVAD  -diuresing with Lasix drip: net negative over last 24 hours             Biventricular implantable cardioverter-defibrillator in situ    - in place  - ICD interrogated, event noted on 2/25/18 with appropriate shock for VT  -Continue Amiodarone po            YANET Harris  Heart Transplant  [facility]

## 2018-03-16 LAB
ALBUMIN SERPL BCP-MCNC: 2.7 G/DL
ALP SERPL-CCNC: 167 U/L
ALT SERPL W/O P-5'-P-CCNC: 40 U/L
ANION GAP SERPL CALC-SCNC: 12 MMOL/L
APTT BLDCRRT: 34.4 SEC
APTT BLDCRRT: 38.4 SEC
APTT BLDCRRT: 38.9 SEC
APTT BLDCRRT: 45.7 SEC
APTT BLDCRRT: 92.7 SEC
AST SERPL-CCNC: 54 U/L
BACTERIA BLD CULT: NORMAL
BACTERIA BLD CULT: NORMAL
BACTERIA UR CULT: NO GROWTH
BASOPHILS # BLD AUTO: 0.02 K/UL
BASOPHILS NFR BLD: 0.1 %
BILIRUB DIRECT SERPL-MCNC: 2.3 MG/DL
BILIRUB SERPL-MCNC: 2.9 MG/DL
BNP SERPL-MCNC: 531 PG/ML
BUN SERPL-MCNC: 36 MG/DL
CALCIUM SERPL-MCNC: 10.1 MG/DL
CHLORIDE SERPL-SCNC: 90 MMOL/L
CO2 SERPL-SCNC: 32 MMOL/L
CREAT SERPL-MCNC: 1.8 MG/DL
CRP SERPL-MCNC: 205 MG/L
DIFFERENTIAL METHOD: ABNORMAL
EOSINOPHIL # BLD AUTO: 0.1 K/UL
EOSINOPHIL NFR BLD: 0.5 %
ERYTHROCYTE [DISTWIDTH] IN BLOOD BY AUTOMATED COUNT: 16.6 %
EST. GFR  (AFRICAN AMERICAN): 49.3 ML/MIN/1.73 M^2
EST. GFR  (NON AFRICAN AMERICAN): 42.6 ML/MIN/1.73 M^2
FACT X PPP CHRO-ACNC: 0.26 IU/ML
FACT X PPP CHRO-ACNC: 0.27 IU/ML
FACT X PPP CHRO-ACNC: 0.31 IU/ML
FACT X PPP CHRO-ACNC: 0.36 IU/ML
FACT X PPP CHRO-ACNC: 0.68 IU/ML
FACT X PPP CHRO-ACNC: 0.7 IU/ML
GLUCOSE SERPL-MCNC: 135 MG/DL
HCT VFR BLD AUTO: 24 %
HGB BLD-MCNC: 7.6 G/DL
IMM GRANULOCYTES # BLD AUTO: 0.51 K/UL
IMM GRANULOCYTES NFR BLD AUTO: 2.9 %
INR PPP: 1.3
LDH SERPL L TO P-CCNC: 418 U/L
LYMPHOCYTES # BLD AUTO: 1.3 K/UL
LYMPHOCYTES NFR BLD: 7.4 %
MAGNESIUM SERPL-MCNC: 2 MG/DL
MAGNESIUM SERPL-MCNC: 2.2 MG/DL
MCH RBC QN AUTO: 27.1 PG
MCHC RBC AUTO-ENTMCNC: 31.7 G/DL
MCV RBC AUTO: 86 FL
MONOCYTES # BLD AUTO: 1.8 K/UL
MONOCYTES NFR BLD: 10 %
NEUTROPHILS # BLD AUTO: 14.1 K/UL
NEUTROPHILS NFR BLD: 79.1 %
NRBC BLD-RTO: 1 /100 WBC
PHOSPHATE SERPL-MCNC: 3.7 MG/DL
PLATELET # BLD AUTO: 300 K/UL
PMV BLD AUTO: 11.7 FL
POCT GLUCOSE: 108 MG/DL (ref 70–110)
POCT GLUCOSE: 117 MG/DL (ref 70–110)
POCT GLUCOSE: 124 MG/DL (ref 70–110)
POTASSIUM SERPL-SCNC: 3.6 MMOL/L
POTASSIUM SERPL-SCNC: 3.8 MMOL/L
POTASSIUM SERPL-SCNC: 3.8 MMOL/L
POTASSIUM SERPL-SCNC: 3.9 MMOL/L
POTASSIUM SERPL-SCNC: 3.9 MMOL/L
POTASSIUM SERPL-SCNC: 4.1 MMOL/L
PREALB SERPL-MCNC: 13 MG/DL
PROT SERPL-MCNC: 6.9 G/DL
PROTHROMBIN TIME: 12.8 SEC
RBC # BLD AUTO: 2.8 M/UL
SODIUM SERPL-SCNC: 134 MMOL/L
WBC # BLD AUTO: 17.76 K/UL

## 2018-03-16 PROCEDURE — 85025 COMPLETE CBC W/AUTO DIFF WBC: CPT

## 2018-03-16 PROCEDURE — 80076 HEPATIC FUNCTION PANEL: CPT

## 2018-03-16 PROCEDURE — 83615 LACTATE (LD) (LDH) ENZYME: CPT

## 2018-03-16 PROCEDURE — 97530 THERAPEUTIC ACTIVITIES: CPT

## 2018-03-16 PROCEDURE — 85520 HEPARIN ASSAY: CPT | Mod: 91

## 2018-03-16 PROCEDURE — A4216 STERILE WATER/SALINE, 10 ML: HCPCS | Performed by: THORACIC SURGERY (CARDIOTHORACIC VASCULAR SURGERY)

## 2018-03-16 PROCEDURE — 84100 ASSAY OF PHOSPHORUS: CPT

## 2018-03-16 PROCEDURE — 86140 C-REACTIVE PROTEIN: CPT

## 2018-03-16 PROCEDURE — 85610 PROTHROMBIN TIME: CPT

## 2018-03-16 PROCEDURE — 63600175 PHARM REV CODE 636 W HCPCS: Performed by: THORACIC SURGERY (CARDIOTHORACIC VASCULAR SURGERY)

## 2018-03-16 PROCEDURE — 80048 BASIC METABOLIC PNL TOTAL CA: CPT

## 2018-03-16 PROCEDURE — 99233 SBSQ HOSP IP/OBS HIGH 50: CPT | Mod: ,,, | Performed by: SURGERY

## 2018-03-16 PROCEDURE — 99232 SBSQ HOSP IP/OBS MODERATE 35: CPT | Mod: ,,, | Performed by: INTERNAL MEDICINE

## 2018-03-16 PROCEDURE — 92526 ORAL FUNCTION THERAPY: CPT

## 2018-03-16 PROCEDURE — 93750 INTERROGATION VAD IN PERSON: CPT | Mod: ,,, | Performed by: INTERNAL MEDICINE

## 2018-03-16 PROCEDURE — 20000000 HC ICU ROOM

## 2018-03-16 PROCEDURE — 83735 ASSAY OF MAGNESIUM: CPT

## 2018-03-16 PROCEDURE — 25000003 PHARM REV CODE 250: Performed by: THORACIC SURGERY (CARDIOTHORACIC VASCULAR SURGERY)

## 2018-03-16 PROCEDURE — 25000003 PHARM REV CODE 250: Performed by: STUDENT IN AN ORGANIZED HEALTH CARE EDUCATION/TRAINING PROGRAM

## 2018-03-16 PROCEDURE — 85730 THROMBOPLASTIN TIME PARTIAL: CPT | Mod: 91

## 2018-03-16 PROCEDURE — 94761 N-INVAS EAR/PLS OXIMETRY MLT: CPT

## 2018-03-16 PROCEDURE — 27000248 HC VAD-ADDITIONAL DAY

## 2018-03-16 PROCEDURE — 97803 MED NUTRITION INDIV SUBSEQ: CPT

## 2018-03-16 PROCEDURE — 97535 SELF CARE MNGMENT TRAINING: CPT

## 2018-03-16 PROCEDURE — 97116 GAIT TRAINING THERAPY: CPT

## 2018-03-16 PROCEDURE — 83880 ASSAY OF NATRIURETIC PEPTIDE: CPT

## 2018-03-16 PROCEDURE — 84132 ASSAY OF SERUM POTASSIUM: CPT | Mod: 91

## 2018-03-16 PROCEDURE — 63600175 PHARM REV CODE 636 W HCPCS: Performed by: STUDENT IN AN ORGANIZED HEALTH CARE EDUCATION/TRAINING PROGRAM

## 2018-03-16 RX ORDER — MUPIROCIN 20 MG/G
1 OINTMENT TOPICAL 2 TIMES DAILY
Status: CANCELLED | OUTPATIENT
Start: 2018-03-16 | End: 2018-03-21

## 2018-03-16 RX ADMIN — OXYCODONE HYDROCHLORIDE 10 MG: 5 TABLET ORAL at 03:03

## 2018-03-16 RX ADMIN — OXYCODONE HYDROCHLORIDE 10 MG: 5 TABLET ORAL at 09:03

## 2018-03-16 RX ADMIN — POTASSIUM CHLORIDE 40 MEQ: 1500 TABLET, EXTENDED RELEASE ORAL at 08:03

## 2018-03-16 RX ADMIN — HEPARIN SODIUM 2200 UNITS/HR: 10000 INJECTION, SOLUTION INTRAVENOUS at 08:03

## 2018-03-16 RX ADMIN — Medication 3 ML: at 02:03

## 2018-03-16 RX ADMIN — FUROSEMIDE: 10 INJECTION, SOLUTION INTRAMUSCULAR; INTRAVENOUS at 04:03

## 2018-03-16 RX ADMIN — FERROUS GLUCONATE TAB 324 MG (37.5 MG ELEMENTAL IRON) 324 MG: 324 (37.5 FE) TAB at 08:03

## 2018-03-16 RX ADMIN — Medication 10 ML: at 05:03

## 2018-03-16 RX ADMIN — Medication 10 ML: at 12:03

## 2018-03-16 RX ADMIN — DOBUTAMINE IN DEXTROSE 4 MCG/KG/MIN: 200 INJECTION, SOLUTION INTRAVENOUS at 07:03

## 2018-03-16 RX ADMIN — FUROSEMIDE: 10 INJECTION, SOLUTION INTRAMUSCULAR; INTRAVENOUS at 09:03

## 2018-03-16 RX ADMIN — HEPARIN SODIUM 2300 UNITS/HR: 10000 INJECTION, SOLUTION INTRAVENOUS at 06:03

## 2018-03-16 RX ADMIN — Medication 81 MG: at 08:03

## 2018-03-16 RX ADMIN — PANTOPRAZOLE SODIUM 40 MG: 40 TABLET, DELAYED RELEASE ORAL at 08:03

## 2018-03-16 RX ADMIN — DOBUTAMINE IN DEXTROSE 4 MCG/KG/MIN: 200 INJECTION, SOLUTION INTRAVENOUS at 11:03

## 2018-03-16 RX ADMIN — AMIODARONE HYDROCHLORIDE 200 MG: 200 TABLET ORAL at 08:03

## 2018-03-16 RX ADMIN — WARFARIN SODIUM 7.5 MG: 2.5 TABLET ORAL at 05:03

## 2018-03-16 NOTE — ASSESSMENT & PLAN NOTE
Neuro:   -off sedation  -pain controlled with oxycodone     Pulmonary:   - extubated 3/11  - Scheduled duo-nebs  - Nitric weaned  - R side PTX first noted after extubation 3/11, CXR 3/12 showed increased size, went to IR, IR saw smaller PTX, chest tube canceled, 3/13 PTX smaller in size on CXR.      Cardiac:  - s/p LVAD 3/8/18, outflow tract revision on 3/9, and chest closure 3/10  - Pressors: dobutamine  - Epi weaned off, wean pressors per CTS  - Amiodarone transitioned to PO     Renal:   - CKD; baseline Cr. 1.7  - WAGNER on 3/8 and 3/11, improving  - Lasix drip  - Monitor UOP     Infectious Disease:   -Leukocytosis trending up but with all cell lines, likely hemoconcentration from lasix.  -Low grade fevers on 3/8 after initial surgery, torsten to >38C on 3/10, afebrile since  -Blood cultures 3/6 NG, 3/11 NGTD  -vancomycin and doxycycline on 3/8   -cefazolin for surgery 3/9 and 3/10  -fluconazole, cefepime, bacitracin IM started 3/8, continuing     Hematology/Oncology:  - Trend CBC, transfuse per CTS  - iron supplementation  - On heparin drip, started warfarin 3/12     Endocrine:  - Insuline gtt  - Endocrine on board     Fluids/Electrolytes/Nutrition/GI:   - tolerating PO intake, advance as tolerated  - Trend CMP  - Replace Lytes PRN      Dispo:  Possible stepdown today. CTS primary.

## 2018-03-16 NOTE — PROGRESS NOTES
[facility]  Heart Transplant  Progress Note    Patient Name: Tim Richards  MRN: [id number]  Admission Date: 3/1/2018  Hospital Length of Stay: 15 days  Attending Physician: Yg Kaufman MD  Primary Care Provider: Ja Méndez MD  Principal Problem:LVAD (left ventricular assist device) present    Subjective:     Interval History: Up in the chair this am. No complaints    Continuous Infusions:   DOBUTamine 4 mcg/kg/min (03/16/18 1400)    custom IV infusion builder (for pharmacist use only) 15 mL/hr at 03/16/18 1400    heparin (porcine) in 5 % dex 2,300 Units/hr (03/16/18 1400)     Scheduled Meds:   amiodarone  200 mg Oral Daily    docusate sodium  200 mg Oral QHS    ferrous gluconate  324 mg Oral Daily with breakfast    INV aspirin/placebo  81 mg Oral Daily    pantoprazole  40 mg Oral Daily    polyethylene glycol  17 g Oral Daily    potassium chloride SA  40 mEq Oral BID    sodium chloride 0.9%  10 mL Intravenous Q6H    sodium chloride 0.9%  3 mL Intravenous Q8H    warfarin  7.5 mg Oral Daily     PRN Meds:acetaminophen, albumin human 5%, albuterol sulfate, bisacodyl, dextrose 50%, dextrose 50%, glucagon (human recombinant), glucose, glucose, insulin aspart U-100, magnesium hydroxide 400 mg/5 ml, oxyCODONE, oxyCODONE, Flushing PICC Protocol **AND** sodium chloride 0.9% **AND** sodium chloride 0.9%    Review of patient's allergies indicates:   Allergen Reactions    Aspirin Other (See Comments)     Prevent II patient- Do NOT order aspirin. Please call Yenny [phone] if patient is admitted to hospital    Lisinopril Anaphylaxis     Objective:     Vital Signs (Most Recent):  Temp: 98.5 °F (36.9 °C) (03/16/18 1100)  Pulse: 88 (03/16/18 1400)  Resp: 19 (03/16/18 1400)  BP: (!) 82/0 (03/16/18 1100)  SpO2: 99 % (03/16/18 0300) Vital Signs (24h Range):  Temp:  [97.7 °F (36.5 °C)-98.9 °F (37.2 °C)] 98.5 °F (36.9 °C)  Pulse:  [81-95] 88  Resp:  [8-28] 19  SpO2:  [88 %-99 %] 99 %  BP:  (80-86)/(0) 82/0     Patient Vitals for the past 72 hrs (Last 3 readings):   Weight   03/14/18 0500 124.6 kg (274 lb 11.1 oz)     Body mass index is 36.24 kg/m².      Intake/Output Summary (Last 24 hours) at 03/16/18 1425  Last data filed at 03/16/18 1400   Gross per 24 hour   Intake             2300 ml   Output             1950 ml   Net              350 ml       Hemodynamic Parameters:         Physical Exam   Constitutional: He is oriented to person, place, and time. He appears well-developed and well-nourished. No distress.   Neck: No JVD present.   Cardiovascular: Normal rate.    Normal VAD hum   Pulmonary/Chest: Effort normal. No respiratory distress.   Abdominal: Soft. He exhibits no distension.   Neurological: He is alert and oriented to person, place, and time.   Skin: Skin is warm and dry.   Psychiatric: He has a normal mood and affect. His behavior is normal.       Significant Labs:  CBC:    Recent Labs  Lab 03/14/18  0440 03/15/18  0430 03/16/18  0455   WBC 15.32* 17.28* 17.76*   RBC 2.61* 2.74* 2.80*   HGB 7.0* 7.6* 7.6*   HCT 21.8* 23.3* 24.0*    278 300   MCV 84 85 86   MCH 26.8* 27.7 27.1   MCHC 32.1 32.6 31.7*     BNP:    Recent Labs  Lab 03/12/18  0506 03/14/18  0440 03/16/18  0455   BNP 1,419* 1,324* 531*     CMP:    Recent Labs  Lab 03/14/18  0440  03/15/18  0430 03/15/18  0852  03/15/18  2356 03/16/18  0455 03/16/18  1204     --  97 166*  --   --  135*  --    CALCIUM 9.7  --  9.9 10.2  --   --  10.1  --    ALBUMIN 2.6*  --   --  2.9*  --   --  2.7*  --    PROT 6.5  --   --  7.3  --   --  6.9  --      --  137 135*  --   --  134*  --    K 4.2  < > 4.0  4.0 3.8  < > 3.9 3.8  3.8 4.1   CO2 31*  --  34* 32*  --   --  32*  --    CL 96  --  93* 91*  --   --  90*  --    BUN 37*  --  37* 37*  --   --  36*  --    CREATININE 1.8*  --  1.8* 1.9*  --   --  1.8*  --    ALKPHOS 120  --   --  174*  --   --  167*  --    ALT 29  --   --  38  --   --  40  --    AST 53*  --   --  58*  --   --   54*  --    BILITOT 3.7*  --   --  3.8*  --   --  2.9*  --    < > = values in this interval not displayed.   Coagulation:     Recent Labs  Lab 03/14/18  0604  03/15/18  0430  03/15/18  2356 03/16/18  0455 03/16/18  0732 03/16/18  1204   INR 0.9  --  1.1  --   --  1.3*  --   --    APTT 30.6  < >  --   < > 34.4*  --  38.4* 38.9*   < > = values in this interval not displayed.  LDH:    Recent Labs  Lab 03/14/18  0440 03/15/18  0430 03/16/18  0455   * 401* 418*     Microbiology:  Microbiology Results (last 7 days)     Procedure Component Value Units Date/Time    Culture, Respiratory with Gram Stain [[id number]] Collected:  03/11/18 0800    Order Status:  Completed Specimen:  Respiratory from Endotracheal Aspirate Updated:  03/16/18 1108     Respiratory Culture Further report to follow     Gram Stain (Respiratory) <10 epithelial cells per low power field.     Gram Stain (Respiratory) Few WBC's     Gram Stain (Respiratory) Rare Gram positive cocci     Gram Stain (Respiratory) Rare Gram positive rods    Blood culture [[id number]] Collected:  03/11/18 0650    Order Status:  Completed Specimen:  Blood from Peripheral, Hand, Right Updated:  03/16/18 1012     Blood Culture, Routine No growth after 5 days.    Blood culture [[id number]] Collected:  03/11/18 0651    Order Status:  Completed Specimen:  Blood from Peripheral, Wrist, Right Updated:  03/16/18 1012     Blood Culture, Routine No growth after 5 days.    Blood culture [[id number]] Collected:  03/15/18 1407    Order Status:  Completed Specimen:  Blood from Peripheral, Antecubital, Left Updated:  03/16/18 0115     Blood Culture, Routine No Growth to date    Blood culture [[id number]] Collected:  03/15/18 1358    Order Status:  Completed Specimen:  Blood from Peripheral, Hand, Left Updated:  03/16/18 0115     Blood Culture, Routine No Growth to date    Urine culture [[id number]] Collected:  03/15/18 1431    Order Status:  Sent Specimen:  Urine from Urine, Clean Catch  Updated:  03/15/18 1601    Blood culture [[id number]] Collected:  03/06/18 1107    Order Status:  Completed Specimen:  Blood Updated:  03/11/18 1612     Blood Culture, Routine No growth after 5 days.    Blood culture [[id number]] Collected:  03/06/18 1107    Order Status:  Completed Specimen:  Blood Updated:  03/11/18 1612     Blood Culture, Routine No growth after 5 days.    IV catheter culture [[id number]] Collected:  03/07/18 1151    Order Status:  Completed Specimen:  Catheter Tip from Catheter Tip, Intrajugular Updated:  03/10/18 0659     Aerobic Culture - Cath tip No growth          I have reviewed all pertinent labs within the past 24 hours.    Estimated Creatinine Clearance: 67.2 mL/min (A) (based on SCr of 1.8 mg/dL (H)).    Diagnostic Results:  I have reviewed and interpreted all pertinent imaging results/findings within the past 24 hours.    Assessment and Plan:     Mr. Richards is a 52 y.o. year old Black or  male who has presents as f/u from hospitalization for ADHF after presenting to clinic 1/10/17 as initial consult. advanced surgical options (LVAD/OHT).    This 51 yo BM has had CHF since 2011 at which time an angiogram did not demonstrate any CAD.  He is on amiodarone for VT. He was admitted from 1/12-1/17/18 (see d/c summary) where he was treated for ADHF and initiation of w/u for advanced options. RHC during that admission showed RA 17 and PCWP 35 as well as severely reduced CI 1.6. Though not optimized, he was discharged per his request so as not to lose his job/insurance--see Dr. Hadley's attestation for full details on d/c summary. Since d/c, his Scr has risen from 2.0 on discharge to 2.8 (1/23/18). His BNP had declined to 1040.  He presents today for scheduled hospital f/u.      Today, he reports feeling well. He says he has more energy than usual. His only complaints are cramping and xerosis. He denies n/v/d, no CP, palpitations or syncope. He has stable orthostatic dizziness/LH.  No PND or orthopnea. His COLEMAN is improved from discharge and his weight is down about 5-7 pounds on his home scale. Of note, his Scr on labs from 2 days ago showed Scr 2.8 up from 2.0. T. Bili was down from 1.1 to 0.6 and BNP was down to 1040 from 1700.  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 No labs initially ordered but I ordered and sent him down.     * LVAD (left ventricular assist device) present    -HeartMate II Implanted 3/8/18 as DT with repositioning of outflow graft 3/9/18.  S/p chest closure 3/10/18  -CTS Primary  -Implanted by Dr. Kaufman  -Anticoagulation per Primary Coumadin  -Antiplatelets; Patient is enrolled in PREVENT II  -Speed set at 9000, LSL 8600 rpm  -Not listed for OHTx  - On ; Now off Marilia and EPi  -On Lasix drip. CVP 7 this am. Recommend decreasing Lasix drip            Acute on chronic combined systolic and diastolic heart failure    -NICM   -Echo prior to VAD- EF: <10%; LVEDD: 9.7 cm  -now s/p HM II LVAD- most recent Echo 3/14/18- LVEDD 8.5  -diuresing with Lasix drip, CVP 7. Recommend decreasing Lasix drip          Pneumothorax    -IR unable to place chest tube or pig tail; monitor daily CXR        Anemia    -Transfused a unit PRBC's 3/12  -H/H stable        Hyperbilirubinemia    -likely secondary to above, trending down  -RUQ ultrasound neg        CKD (chronic kidney disease), stage III    - cr 1.8 today  -Baseline creat 1.6-2.0.        PVT (paroxysmal ventricular tachycardia)    -has ICD  -Transitioned from IV Amiodarone infusion to po         Biventricular implantable cardioverter-defibrillator in situ    - in place  - ICD interrogated, event noted on 2/25/18 with appropriate shock for VT  -Continue Amiodarone po            Nely Wilkinson PA-C  Heart Transplant  [facility]

## 2018-03-16 NOTE — PT/OT/SLP PROGRESS
Occupational Therapy   Treatment    Name: Tim Richards  MRN: [id number]  Admitting Diagnosis:  LVAD (left ventricular assist device) present  6 Days Post-Op    Recommendations:   Discharge Recommendations: home  Discharge Equipment Recommendations:  none  Barriers to discharge:  None     Subjective      Communicated with: RN prior to session.  Pain/Comfort:  · Pain Rating 1: 5/10  · Midline at CT site  Pain Rating Post-Intervention 1: 5/10   Patients cultural, spiritual, Anabaptist conflicts given the current situation: none reported     Objective:      Patient found with: telemetry, pulse ox (continuous), PICC line, arterial line, chest tube,  blood pressure cuff     General Precautions: Standard, fall, sternal, LVAD   Orthopedic Precautions:    Braces:        Occupational Performance:     Bed Mobility:    · NT      Functional Mobility/Transfers:  · Patient completed Sit <> Stand Transfer with minimum assistance  with  no assistive device from b/s chair  · Functional Mobility: CGA with B HHA to/from sink     Activities of Daily Living:  · Grooming: contact guard assistance standing at sink  · UB Dressing: maximal assistance    · LB Dressing: maximal assistance       Patient left up in chair with all lines intact, call button in reach, RN notified and spouse present     [facility] 6 Click:  [facility] Total Score: 13     Treatment & Education:  Pt ed on OT POC  Pt re-ed on sternal precautions during ADL  [place] in proper place and DL secured  Pt t/f'd to/from battery with minimal A; pt needed cueing to recall items needed, cueing to perform Self Test and cueing to bring emergency bag as well as recalling contents of emergency bag  Assessment:      Tim Richards is a 52 y.o. male with a medical diagnosis of Acute decompensated heart failure.  He presents s/p LVAD.  Performance deficits affecting function are impaired self care skills, impaired balance, weakness, impaired endurance, impaired functional mobilty, gait  instability, decreased upper extremity function, impaired cardiopulmonary response to activity.       Rehab Prognosis:  Good; patient would benefit from acute skilled OT services to address these deficits and reach maximum level of function.        Plan:      Patient to be seen 6 x/week to address the above listed problems via self-care/home management, therapeutic activities, therapeutic exercises  ? Plan of Care Expires: 04/11/18  ? Plan of Care Reviewed with: spouse, patient     This Plan of care has been discussed with the patient who was involved in its development and understands and is in agreement with the identified goals and treatment plan     GOALS:          Occupational Therapy Goals                 Problem: Occupational Therapy Goal      Goal Priority Disciplines Outcome Interventions   Occupational Therapy Goal      OT, PT/OT Ongoing (interventions implemented as appropriate)     Description:  Goals to be met by: 3/26/18      Patient will increase functional independence with ADLs by performing:     Feeding with [place].  UE Dressing with Supervision.  LE Dressing with Supervision.  Grooming while standing at sink with Supervision.  Toileting from toilet with Supervision for hygiene and clothing management.   Toilet transfer to toilet with Supervision.  Pt will be (I) with VAD management during ADL.                  Time Tracking:     OT Date of Treatment: 03/15/18  OT Start Time: 0957  OT Stop Time: 1027  OT Total Time (min): 30min    Billable Minutes:Self Care/Home Management 10  Therapeutic Activity 15    CLIFF Garcia  3/16/2018

## 2018-03-16 NOTE — PROGRESS NOTES
Pt found walking in the egan with PT, RN and wife. Pt states that he hopes to transfer to the floor today. Plan for patient to complete workbook over the weekend with checkoff on Monday. Wife states that she has completed the workbook and did the dressing change yesterday (hopeful to be checked off over the weekend).

## 2018-03-16 NOTE — PLAN OF CARE
Patient resting comfortably on room air. VSS - see flowsheets for specific values. VAD numbers stable - speed: 9000, flows: 5.8-6.5, PI  4.7-5.3, Power: 5.2-6. Lasix, heparin and dobutamine infusing. Urine output > 1L overnight. Patient refused to get OOB to chair this AM and reports that he wants to be 'left alone and sleep' and doesn't understand 'why we keep bothering him.' Patient educated regarding the importance of tracking VS and VAD numbers throughout the shift and early mobility. Plan to encourage patient to work with PT/OT and continue to track labs/VS. Patient and wife updated regarding plan of care - questions answered. Will continue to monitor.

## 2018-03-16 NOTE — PROGRESS NOTES
Patient refused to get out of bed to chair numerous times this AM - had float nurse, EMILY Suh, come and try to encourage patient to sit in chair and patient again refused. Says he will 'get up during the next shift' and wants to be 'left alone.' Patient reminded of the importance of obtaining vital signs and early ambulation.

## 2018-03-16 NOTE — PLAN OF CARE
Problem: Patient Care Overview  Goal: Plan of Care Review  Outcome: Ongoing (interventions implemented as appropriate)  All VSS, afebrile, SpO2 >96 on RA  VAD numbers stable- Speed: 9000, Flows: 5.9-6.3, PI: 4.9-5.8 , Power: 5.5-6.   Lasix gtt, heparin gtt, and dobutamine gtt continued. PTT monitored and Heparin gtt titrated per MD orders  UO monitored (see flowsheet)  Pt walked with PT and RN around unit; was up in chair for 8hrs.  VAD dressing change done via pt's wife while RN monitored.   Pt studying VAD booklet throughout shift  Patient and wife updated on plan of care, all questions/concerns answered/addressed. Transfer pending. Will continue to monitor.

## 2018-03-16 NOTE — ASSESSMENT & PLAN NOTE
Contributing Nutrition Diagnosis  Increased nutrient needs    Related to (etiology):   Physiological causes    Signs and Symptoms (as evidenced by):   S/p LVAD    Nutrition Diagnosis Status:   Continues

## 2018-03-16 NOTE — PLAN OF CARE
Problem: Occupational Therapy Goal  Goal: Occupational Therapy Goal  Goals to be met by: 3/26/18     Patient will increase functional independence with ADLs by performing:    Feeding with [place].  UE Dressing with Supervision.  LE Dressing with Supervision.  Grooming while standing at sink with Supervision.  Toileting from toilet with Supervision for hygiene and clothing management.   Toilet transfer to toilet with Supervision.  Pt will be (I) with VAD management during ADL.     Outcome: Ongoing (interventions implemented as appropriate)  The above goals remain appropriate. CLIFF Garcia  3/16/2018

## 2018-03-16 NOTE — SUBJECTIVE & OBJECTIVE
Interval History: Up in the chair this am. No complaints    Continuous Infusions:   DOBUTamine 4 mcg/kg/min (03/16/18 1400)    custom IV infusion builder (for pharmacist use only) 15 mL/hr at 03/16/18 1400    heparin (porcine) in 5 % dex 2,300 Units/hr (03/16/18 1400)     Scheduled Meds:   amiodarone  200 mg Oral Daily    docusate sodium  200 mg Oral QHS    ferrous gluconate  324 mg Oral Daily with breakfast    INV aspirin/placebo  81 mg Oral Daily    pantoprazole  40 mg Oral Daily    polyethylene glycol  17 g Oral Daily    potassium chloride SA  40 mEq Oral BID    sodium chloride 0.9%  10 mL Intravenous Q6H    sodium chloride 0.9%  3 mL Intravenous Q8H    warfarin  7.5 mg Oral Daily     PRN Meds:acetaminophen, albumin human 5%, albuterol sulfate, bisacodyl, dextrose 50%, dextrose 50%, glucagon (human recombinant), glucose, glucose, insulin aspart U-100, magnesium hydroxide 400 mg/5 ml, oxyCODONE, oxyCODONE, Flushing PICC Protocol **AND** sodium chloride 0.9% **AND** sodium chloride 0.9%    Review of patient's allergies indicates:   Allergen Reactions    Aspirin Other (See Comments)     Prevent II patient- Do NOT order aspirin. Please call Yenny [phone] if patient is admitted to hospital    Lisinopril Anaphylaxis     Objective:     Vital Signs (Most Recent):  Temp: 98.5 °F (36.9 °C) (03/16/18 1100)  Pulse: 88 (03/16/18 1400)  Resp: 19 (03/16/18 1400)  BP: (!) 82/0 (03/16/18 1100)  SpO2: 99 % (03/16/18 0300) Vital Signs (24h Range):  Temp:  [97.7 °F (36.5 °C)-98.9 °F (37.2 °C)] 98.5 °F (36.9 °C)  Pulse:  [81-95] 88  Resp:  [8-28] 19  SpO2:  [88 %-99 %] 99 %  BP: (80-86)/(0) 82/0     Patient Vitals for the past 72 hrs (Last 3 readings):   Weight   03/14/18 0500 124.6 kg (274 lb 11.1 oz)     Body mass index is 36.24 kg/m².      Intake/Output Summary (Last 24 hours) at 03/16/18 1425  Last data filed at 03/16/18 1400   Gross per 24 hour   Intake             2300 ml   Output             1950 ml   Net               350 ml       Hemodynamic Parameters:         Physical Exam   Constitutional: He is oriented to person, place, and time. He appears well-developed and well-nourished. No distress.   Neck: No JVD present.   Cardiovascular: Normal rate.    Normal VAD hum   Pulmonary/Chest: Effort normal. No respiratory distress.   Abdominal: Soft. He exhibits no distension.   Neurological: He is alert and oriented to person, place, and time.   Skin: Skin is warm and dry.   Psychiatric: He has a normal mood and affect. His behavior is normal.       Significant Labs:  CBC:    Recent Labs  Lab 03/14/18  0440 03/15/18  0430 03/16/18  0455   WBC 15.32* 17.28* 17.76*   RBC 2.61* 2.74* 2.80*   HGB 7.0* 7.6* 7.6*   HCT 21.8* 23.3* 24.0*    278 300   MCV 84 85 86   MCH 26.8* 27.7 27.1   MCHC 32.1 32.6 31.7*     BNP:    Recent Labs  Lab 03/12/18  0506 03/14/18  0440 03/16/18  0455   BNP 1,419* 1,324* 531*     CMP:    Recent Labs  Lab 03/14/18  0440  03/15/18  0430 03/15/18  0852  03/15/18  2356 03/16/18  0455 03/16/18  1204     --  97 166*  --   --  135*  --    CALCIUM 9.7  --  9.9 10.2  --   --  10.1  --    ALBUMIN 2.6*  --   --  2.9*  --   --  2.7*  --    PROT 6.5  --   --  7.3  --   --  6.9  --      --  137 135*  --   --  134*  --    K 4.2  < > 4.0  4.0 3.8  < > 3.9 3.8  3.8 4.1   CO2 31*  --  34* 32*  --   --  32*  --    CL 96  --  93* 91*  --   --  90*  --    BUN 37*  --  37* 37*  --   --  36*  --    CREATININE 1.8*  --  1.8* 1.9*  --   --  1.8*  --    ALKPHOS 120  --   --  174*  --   --  167*  --    ALT 29  --   --  38  --   --  40  --    AST 53*  --   --  58*  --   --  54*  --    BILITOT 3.7*  --   --  3.8*  --   --  2.9*  --    < > = values in this interval not displayed.   Coagulation:     Recent Labs  Lab 03/14/18  0604  03/15/18  0430  03/15/18  2356 03/16/18  0455 03/16/18  0732 03/16/18  1204   INR 0.9  --  1.1  --   --  1.3*  --   --    APTT 30.6  < >  --   < > 34.4*  --  38.4* 38.9*   < > = values in  this interval not displayed.  LDH:    Recent Labs  Lab 03/14/18  0440 03/15/18  0430 03/16/18  0455   * 401* 418*     Microbiology:  Microbiology Results (last 7 days)     Procedure Component Value Units Date/Time    Culture, Respiratory with Gram Stain [[id number]] Collected:  03/11/18 0800    Order Status:  Completed Specimen:  Respiratory from Endotracheal Aspirate Updated:  03/16/18 1108     Respiratory Culture Further report to follow     Gram Stain (Respiratory) <10 epithelial cells per low power field.     Gram Stain (Respiratory) Few WBC's     Gram Stain (Respiratory) Rare Gram positive cocci     Gram Stain (Respiratory) Rare Gram positive rods    Blood culture [[id number]] Collected:  03/11/18 0650    Order Status:  Completed Specimen:  Blood from Peripheral, Hand, Right Updated:  03/16/18 1012     Blood Culture, Routine No growth after 5 days.    Blood culture [[id number]] Collected:  03/11/18 0651    Order Status:  Completed Specimen:  Blood from Peripheral, Wrist, Right Updated:  03/16/18 1012     Blood Culture, Routine No growth after 5 days.    Blood culture [[id number]] Collected:  03/15/18 1407    Order Status:  Completed Specimen:  Blood from Peripheral, Antecubital, Left Updated:  03/16/18 0115     Blood Culture, Routine No Growth to date    Blood culture [[id number]] Collected:  03/15/18 1358    Order Status:  Completed Specimen:  Blood from Peripheral, Hand, Left Updated:  03/16/18 0115     Blood Culture, Routine No Growth to date    Urine culture [[id number]] Collected:  03/15/18 1431    Order Status:  Sent Specimen:  Urine from Urine, Clean Catch Updated:  03/15/18 1601    Blood culture [[id number]] Collected:  03/06/18 1107    Order Status:  Completed Specimen:  Blood Updated:  03/11/18 1612     Blood Culture, Routine No growth after 5 days.    Blood culture [[id number]] Collected:  03/06/18 1107    Order Status:  Completed Specimen:  Blood Updated:  03/11/18 1612     Blood Culture, Routine No  growth after 5 days.    IV catheter culture [[id number]] Collected:  03/07/18 1151    Order Status:  Completed Specimen:  Catheter Tip from Catheter Tip, Intrajugular Updated:  03/10/18 0659     Aerobic Culture - Cath tip No growth          I have reviewed all pertinent labs within the past 24 hours.    Estimated Creatinine Clearance: 67.2 mL/min (A) (based on SCr of 1.8 mg/dL (H)).    Diagnostic Results:  I have reviewed and interpreted all pertinent imaging results/findings within the past 24 hours.

## 2018-03-16 NOTE — PLAN OF CARE
Problem: Physical Therapy Goal  Goal: Physical Therapy Goal  Goals to be met by: 3/26     Patient will increase functional independence with mobility by performin. Supine to sit with Stand-by Assistance-not met  2. Sit to supine with Stand-by Assistance-not met  3. Sit to stand transfer with Stand-by Assistance - met 3/16  4. Gait  x 100 feet with Stand-by Assistance -not met     Goals remain appropriate. Nayana Alvarez, PT 3/16/2018

## 2018-03-16 NOTE — PT/OT/SLP PROGRESS
Physical Therapy Treatment    Patient Name:  Tim Richards   MRN:  [id number]    Recommendations:     Discharge Recommendations:   (home no needs.)   Discharge Equipment Recommendations: none   Barriers to discharge: None    Assessment:     Tim Richards is a 52 y.o. male admitted with a medical diagnosis of LVAD (left ventricular assist device) present.  He presents with the following impairments/functional limitations:  impaired functional mobilty, gait instability, impaired balance, decreased lower extremity function pt meredith treatment better being able to gait train farther. Pt will benefit from skilled PT 6x/wk to progress physically and return pt to Independent status. Pt is s/p LVAD HM 3 placement 3/8, revision of outflow graft 3/9, chest closure 3/10/18. .    Rehab Prognosis:  good; patient would benefit from acute skilled PT services to address these deficits and reach maximum level of function.      Recent Surgery: Procedure(s) (LRB):  CLOSURE-STERNAL WOUND (N/A)  PLACEMENT-NEOPERICARDIUM 6 Days Post-Op    Plan:     During this hospitalization, patient to be seen 6 x/week to address the above listed problems via gait training, therapeutic activities, therapeutic exercises  · Plan of Care Expires:  04/11/18   Plan of Care Reviewed with: patient, spouse    Subjective     Communicated with nurse prior to session.  Patient found sitting in chair  upon PT entry to room, agreeable to treatment.      Chief Complaint: pt c/o pain during treatment.   Patient comments/goals: to get better and go home.   Pain/Comfort:  · Pain Rating 1: 6/10  · Location 1:  (chest)  · Pain Rating Post-Intervention 1: 6/10    Patients cultural, spiritual, Yarsanism conflicts given the current situation: none    Objective:     Patient found with: telemetry, blood pressure cuff, pulse ox (continuous), LVAD, PICC line     General Precautions: Standard, LVAD, fall, sternal   Orthopedic Precautions:N/A   Braces:       Functional  Mobility:  · Transfers:     · Sit to Stand:  Stand by assistance with no AD  ·   · Gait: 134 ft x 2 reps. pt was SBA with gait training and had emergency bag present and RN with portable monitor. pt had sitting rest period between distance ambulated. Pt had B shuffling steps with gait training.       AM-PAC 6 CLICK MOBILITY  Turning over in bed (including adjusting bedclothes, sheets and blankets)?: 3  Sitting down on and standing up from a chair with arms (e.g., wheelchair, bedside commode, etc.): 4  Moving from lying on back to sitting on the side of the bed?: 3  Moving to and from a bed to a chair (including a wheelchair)?: 4  Need to walk in hospital room?: 3  Climbing 3-5 steps with a railing?: 3  Total Score: 20       Therapeutic Activities and Exercises:   pt SBA supervision for transfer from LVAD battery to power base.     Patient left up in chair with all lines intact, call button in reach and wife. present..    GOALS:    Physical Therapy Goals        Problem: Physical Therapy Goal    Goal Priority Disciplines Outcome Goal Variances Interventions   Physical Therapy Goal     PT/OT, PT Ongoing (interventions implemented as appropriate)     Description:  Goals to be met by: 3/26     Patient will increase functional independence with mobility by performin. Supine to sit with Stand-by Assistance-not met  2. Sit to supine with Stand-by Assistance-not met  3. Sit to stand transfer with Stand-by Assistance - met 3/16  4. Gait  x 100 feet with Stand-by Assistance -not met                       Time Tracking:     PT Received On: 18  PT Start Time: 928     PT Stop Time: 951  PT Total Time (min): 23 min     Billable Minutes: Gait Training 23 min    Treatment Type: Treatment  PT/PTA: PT     PTA Visit Number: 0     Nayana Alvarez, PT  2018

## 2018-03-16 NOTE — PROGRESS NOTES
[facility]  Cardiothoracic Surgery  Progress Note    Patient Name: Tim Richards  MRN: [id number]  Admission Date: 3/1/2018  Hospital Length of Stay: 15 days  Code Status: Full Code   Attending Physician: Yg Kaufman MD   Referring Provider: Amy Rhodes MD  Principal Problem:LVAD (left ventricular assist device) present    Subjective:     Post-Op Info:  Procedure(s) (LRB):  CLOSURE-STERNAL WOUND (N/A)  PLACEMENT-NEOPERICARDIUM   6 Days Post-Op     Subjective:     Post-Op Info:  Procedure(s) (LRB):  CLOSURE-STERNAL WOUND (N/A)  PLACEMENT-NEOPERICARDIUM   6 Days Post-Op        Interval History:   NAEON. Tbili decreasing. WBC high but stable, cultures negative.    Medications:  Continuous Infusions:   DOBUTamine 4 mcg/kg/min (03/16/18 0900)    epinephrine infusion Stopped (03/14/18 2100)    custom IV infusion builder (for pharmacist use only) 15 mL/hr at 03/16/18 0900    heparin (porcine) in 5 % dex 2,300 Units/hr (03/16/18 0913)     Scheduled Meds:   amiodarone  200 mg Oral Daily    docusate sodium  200 mg Oral QHS    ferrous gluconate  324 mg Oral Daily with breakfast    INV aspirin/placebo  81 mg Oral Daily    pantoprazole  40 mg Oral Daily    polyethylene glycol  17 g Oral Daily    potassium chloride SA  40 mEq Oral BID    sodium chloride 0.9%  10 mL Intravenous Q6H    sodium chloride 0.9%  3 mL Intravenous Q8H    warfarin  7.5 mg Oral Daily     PRN Meds:acetaminophen, albumin human 5%, albuterol sulfate, bisacodyl, dextrose 50%, dextrose 50%, glucagon (human recombinant), glucose, glucose, insulin aspart U-100, magnesium hydroxide 400 mg/5 ml, oxyCODONE, oxyCODONE, Flushing PICC Protocol **AND** sodium chloride 0.9% **AND** sodium chloride 0.9%     Objective:     Vital Signs (Most Recent):  Temp: 98.3 °F (36.8 °C) (03/16/18 0700)  Pulse: 95 (03/16/18 0945)  Resp: (!) 24 (03/16/18 0945)  BP: (!) 86/0 (03/16/18 0700)  SpO2: 99 % (03/16/18 0300) Vital Signs (24h  Range):  Temp:  [97.7 °F (36.5 °C)-98.9 °F (37.2 °C)] 98.3 °F (36.8 °C)  Pulse:  [81-95] 95  Resp:  [8-27] 24  SpO2:  [88 %-99 %] 99 %  BP: ()/(0-72) 86/0       Intake/Output Summary (Last 24 hours) at 03/16/18 1003  Last data filed at 03/16/18 0700   Gross per 24 hour   Intake             2000 ml   Output             2375 ml   Net             -375 ml       Physical Exam   Constitutional: He is oriented to person, place, and time. He appears well-developed and well-nourished. No distress.   Cardiovascular: Normal rate.    Mechanical hum   Pulmonary/Chest: Effort normal. No respiratory distress.   Abdominal: Soft. He exhibits no distension. There is no tenderness.   Neurological: He is alert and oriented to person, place, and time.   Skin: Skin is warm and dry.   Psychiatric: He has a normal mood and affect. His behavior is normal.       Significant Labs:  CBC:   Recent Labs  Lab 03/16/18 0455   WBC 17.76*   RBC 2.80*   HGB 7.6*   HCT 24.0*      MCV 86   MCH 27.1   MCHC 31.7*     CMP:   Recent Labs  Lab 03/16/18 0455   *   CALCIUM 10.1   ALBUMIN 2.7*   PROT 6.9   *   K 3.8  3.8   CO2 32*   CL 90*   BUN 36*   CREATININE 1.8*   ALKPHOS 167*   ALT 40   AST 54*   BILITOT 2.9*     Coagulation:   Recent Labs  Lab 03/16/18 0455 03/16/18  0732   INR 1.3*  --    APTT  --  38.4*       Significant Diagnostics:  CXR: I have reviewed all pertinent results/findings within the past 24 hours    Procedure: Device Interrogation Including analysis of device parameters  Current Settings: Ventricular Assist Device  Review of device function is stable  TXP LVAD INTERROGATIONS 3/16/2018 3/16/2018 3/16/2018 3/16/2018 3/16/2018 3/16/2018 3/16/2018   Type HeartMate II HeartMate II HeartMate II HeartMate II HeartMate II HeartMate II HeartMate II   Flow 5.9 5.9 5.9 5.9 5.8 5.8 6.0   Speed 9000 9000 9000 9000 9000 9000 9000   PI 5.4 5.8 4.9 5.3 5.1 5.2 5.2   Power ([place]) 5.6 5.6 5.5 5.6 5.2 5.8 5.5   [facility] [id number]  8600 8600 8600 8600 8600   Pulsatility Intermittent pulse Intermittent pulse Intermittent pulse Pulse Pulse Pulse Pulse     Assessment/Plan:     * LVAD (left ventricular assist device) present    Neuro:   - Pain mgmt: oxycodone PRN     Pulmonary:   - Frequent IS  - Daily chest xray     Cardiac:   - Drips: dobutamine 4  - No aspirin- Prevent II trial   - PO amiodarone     Renal:    - UOP 2L  - BUN/Cr stable  - Lasix gtt at 15     Hepatic:  Liver US showed hepatomegaly but no acute process to explain rise in tbili     ID:   - Cultures NGTD     Hem/Onc:   - Monitor hgb; stable  - Heparin gtt, PTT goal 40-50  - Coumadin 7.5mg tonight     Endocrine:    - Insulin subq  - Endocrine following      Fluids/Electrolytes/Nutrition/GI:   - Replace electrolytes PRN per protocol  - Cardiac diet, 1500ml fluid restriction    DISPO:  - Transfer to CTSU          Acute decompensated heart failure    S/p LVAD             Vidhi Nicolas MD  Cardiothoracic Surgery  [facility]

## 2018-03-16 NOTE — PT/OT/SLP PROGRESS
"Occupational Therapy   Treatment    Name: Tim Richards  MRN: [id number]  Admitting Diagnosis:  LVAD (left ventricular assist device) present  6 Days Post-Op    Recommendations:     Discharge Recommendations: home  Discharge Equipment Recommendations:  none  Barriers to discharge:  None    Subjective     Communicated with: RN prior to session.  Pain/Comfort:  · Pain Rating 1: 5/10  · Location - Side 1: Bilateral  · Location - Orientation 1: midline  · Location 1: sternal  · Pain Addressed 1: Distraction  · Pain Rating Post-Intervention 1: 5/10    Patients cultural, spiritual, Zoroastrian conflicts given the current situation: none reported    Objective:     Patient found with: telemetry, PICC line, LVAD    General Precautions: Standard, fall, LVAD, sternal   Orthopedic Precautions:    Braces:       Occupational Performance:    Bed Mobility:    · NT     Functional Mobility/Transfers:  · Patient completed Sit <> Stand Transfer with contact guard assistance  with  no assistive device from b/s chair  · Functional Mobility: CGA to/from sink; pt with no LOB and good pace noted this date    Activities of Daily Living:  · Feeding:  independence    · Grooming: stand by assistance standing at sink  · UB Dressing: minimum assistance    · LB Dressing: moderate assistance    · Toileting: minimum assistance using urinal    Patient left up in chair with all lines intact, call button in reach, rn notified and spouse present    [facility] 6 Click:  [facility] Total Score: 16    Treatment & Education:  Pt ed on OT POC  Pt independently performed Self Test and recorded numbers in binder  Pt able to switch to battery without A and verbal cueing only needed for set-up of consolidation bag  Pt did require cueing for identification of "clips"  Pt able to recall contents of emergency bag, but cueing needed to bring bag for mobility out of room  Education:    Assessment:     Tim Richards is a 52 y.o. male with a medical diagnosis of LVAD (left " ventricular assist device) present.    Performance deficits affecting function are gait instability, impaired endurance, weakness, impaired functional mobilty, decreased safety awareness, impaired balance, impaired self care skills, decreased upper extremity function, impaired cardiopulmonary response to activity, pain.      Rehab Prognosis:  Good; patient would benefit from acute skilled OT services to address these deficits and reach maximum level of function.       Plan:     Patient to be seen 6 x/week to address the above listed problems via self-care/home management, therapeutic activities, therapeutic exercises  · Plan of Care Expires: 04/11/18  · Plan of Care Reviewed with: spouse    This Plan of care has been discussed with the patient who was involved in its development and understands and is in agreement with the identified goals and treatment plan    GOALS:    Occupational Therapy Goals        Problem: Occupational Therapy Goal    Goal Priority Disciplines Outcome Interventions   Occupational Therapy Goal     OT, PT/OT Ongoing (interventions implemented as appropriate)    Description:  Goals to be met by: 3/26/18     Patient will increase functional independence with ADLs by performing:    Feeding with [place].  UE Dressing with Supervision.  LE Dressing with Supervision.  Grooming while standing at sink with Supervision.  Toileting from toilet with Supervision for hygiene and clothing management.   Toilet transfer to toilet with Supervision.  Pt will be (I) with VAD management during ADL.                      Time Tracking:     OT Date of Treatment: 03/16/18  OT Start Time: 0857  OT Stop Time: 0920  OT Total Time (min): 23 min    Billable Minutes:Self Care/Home Management 10  Therapeutic Activity 13    CLIFF Garcia  3/16/2018

## 2018-03-16 NOTE — ASSESSMENT & PLAN NOTE
Neuro:   - Pain mgmt: oxycodone PRN     Pulmonary:   - Frequent IS  - Daily chest xray     Cardiac:   - Drips: dobutamine 4  - No aspirin- Prevent II trial   - PO amiodarone     Renal:    - UOP 2L  - BUN/Cr stable  - Lasix gtt at 15     Hepatic:  Liver US showed hepatomegaly but no acute process to explain rise in tbili     ID:   - Cultures NGTD     Hem/Onc:   - Monitor hgb; stable  - Heparin gtt, PTT goal 40-50  - Coumadin 7.5mg tonight     Endocrine:    - Insulin subq  - Endocrine following      Fluids/Electrolytes/Nutrition/GI:   - Replace electrolytes PRN per protocol  - Cardiac diet, 1500ml fluid restriction    DISPO:  - Transfer to CTSU

## 2018-03-16 NOTE — PROGRESS NOTES
" [facility]  Adult Nutrition  Progress Note    SUMMARY       Recommendations  Recommendation/Intervention:   1. Encourage increased PO intake as tolerated.  2. Recommend adding Boost Glucose Control OS (patient would like strawberry) to all meals until appetite increases.    -If vitamin K content a concern, recommend Optisource VHP OS instead.  3. CSU RD to follow-up with cardiac and coumadin diet education when appropriate.   RD to monitor.    Goals: Patient to receive nutrition by RD follow-up  Nutrition Goal Status: goal met  Communication of RD Recs:  (POC)    Reason for Assessment  Reason for Assessment: RD follow-up  Diagnosis: cardiac disease (s/p LVAD)  Relevant Medical History: CHF, CKD3, HTN  General Information Comments: S/p LVAD 3/8. Chest closure 3/10. Extubated 3/11. Patient reports fair appetite.  Nutrition Discharge Planning: Adequate nutrition via PO intake.    Nutrition Risk Screen  Nutrition Risk Screen: no indicators present    Nutrition/Diet History  Do you have any cultural, spiritual, Faith conflicts, given your current situation?: none  Factors Affecting Nutritional Intake: None identified at this time    Anthropometrics  Temp: 98.5 °F (36.9 °C)  Height Method: Stated  Height: 6' 1" (185.4 cm)  Height (inches): 73 in  Weight Method: Bed Scale  Weight: 124.6 kg (274 lb 11.1 oz)  Weight (lb): 274.7 lb  Ideal Body Weight (IBW), Male: 184 lb  % Ideal Body Weight, Male (lb): 148.57 lb  BMI (Calculated): 36.1  BMI Grade: 35 - 39.9 - obesity - grade II  Usual Body Weight (UBW), k kg  % Usual Body Weight: 95.99    Lab/Procedures/Meds  Pertinent Labs Reviewed: reviewed  Pertinent Labs Comments: Na 134, Cl 90, BUN 36, Creat 1.8, Glu 135, Alk Phos 167, Alb 2.7, T. bili 2.9, AST 54,   Pertinent Medications Reviewed: reviewed  Pertinent Medications Comments: docusate, pantoprazole, coumadin, dobutamine, epinephrine, lasix    Physical Findings/Assessment  Overall " "Physical Appearance: nourished  Tubes:  (none)  Oral/Mouth Cavity: WDL  Skin: edema, incision(s)    Estimated/Assessed Needs  Weight Used For Calorie Calculations: 124.6 kg (274 lb 11.1 oz)  Height: 6' 1" (185.4 cm)  Energy Calorie Requirements (kcal): 2371 kcal/day  Energy Need Method: [place]-St Jeor (x 1.1)  Vtot (L/Min) for [place] Equation Calculation: 10.2  RMR ([place]-St. Jeor Equation): 2154.88  RMR ([place] Equation): 2444.64  RMR (Modified [place] Equation): 2270.71  Protein Requirements: 150 g/day (1.2 g/kg)  Weight Used For Protein Calculations: 124.6 kg (274 lb 11.1 oz)  Fluid Requirements (mL): per MD  RDA Method (mL): 2371     Nutrition Prescription Ordered  Current Diet Order: Mechanical Soft    Evaluation of Received Nutrient/Fluid Intake  I/O: -8.5L since admit, good UOP  Comments: LBM 3/15  % Intake of Estimated Energy Needs: 50 - 75 %  % Meal Intake: 50 - 75 %    Nutrition Risk  Level of Risk/Frequency of Follow-up: moderate (1x/week)     Assessment and Plan  * LVAD (left ventricular assist device) present    Contributing Nutrition Diagnosis  Increased nutrient needs    Related to (etiology):   Physiological causes    Signs and Symptoms (as evidenced by):   S/p LVAD    Nutrition Diagnosis Status:   Continues          Monitor and Evaluation  Food and Nutrient Intake: energy intake, food and beverage intake  Food and Nutrient Adminstration: diet order  Knowledge/Beliefs/Attitudes: food and nutrition knowledge/skill  Physical Activity and Function: nutrition-related ADLs and IADLs  Anthropometric Measurements: weight, weight change  Biochemical Data, Medical Tests and Procedures: electrolyte and renal panel, gastrointestinal profile, inflammatory profile  Nutrition-Focused Physical Findings: overall appearance     Nutrition Follow-Up  RD Follow-up?: Yes    "

## 2018-03-16 NOTE — ASSESSMENT & PLAN NOTE
-NICM   -Echo prior to VAD- EF: <10%; LVEDD: 9.7 cm  -now s/p HM II LVAD- most recent Echo 3/14/18- LVEDD 8.5  -diuresing with Lasix drip, CVP 7. Recommend decreasing Lasix drip

## 2018-03-16 NOTE — ASSESSMENT & PLAN NOTE
-HeartMate II Implanted 3/8/18 as DT with repositioning of outflow graft 3/9/18.  S/p chest closure 3/10/18  -CTS Primary  -Implanted by Dr. Kaufman  -Anticoagulation per Primary Coumadin  -Antiplatelets; Patient is enrolled in PREVENT II  -Speed set at 9000, LSL 8600 rpm  -Not listed for OHTx  - On ; Now off Marilia and EPi  -On Lasix drip. CVP 7 this am. Recommend decreasing Lasix drip

## 2018-03-16 NOTE — PROGRESS NOTES
[facility]  Critical Care - Surgery  Progress Note    Patient Name: Tim Richards  MRN: [id number]  Admission Date: 3/1/2018  Hospital Length of Stay: 15 days  Code Status: Full Code  Attending Provider: Yg Kaufman MD  Primary Care Provider: Ja Méndez MD   Principal Problem: LVAD (left ventricular assist device) present    Subjective:     Hospital/ICU Course:  3/8 - LVAD placement  3/9 - revision of outflow graft, chest remains open  3/10 - chest closure  3/11 - pt extubated, PTX noted  3/12 - 1U PRBC transfused for hgb 6.7. Pt went to IR for chest tube for PTX, canceled due to improving PTX.  3/13 - epi down to 0.02  3/14 - epi off    Interval History/Significant Events: Epi off since 3/14.  Cr elevated at 1.9 on lasix 15mL/hr.  On dobutamine and heparin drip.    Follow-up For: Procedure(s) (LRB):  CLOSURE-STERNAL WOUND (N/A)  PLACEMENT-NEOPERICARDIUM    Post-Operative Day: 6 Days Post-Op    Objective:     Vital Signs (Most Recent):  Temp: 97.7 °F (36.5 °C) (03/16/18 0300)  Pulse: 82 (03/16/18 0515)  Resp: 17 (03/16/18 0515)  BP: (!) 80/0 (03/16/18 0300)  SpO2: 99 % (03/16/18 0300) Vital Signs (24h Range):  Temp:  [97.7 °F (36.5 °C)-98.9 °F (37.2 °C)] 97.7 °F (36.5 °C)  Pulse:  [81-93] 82  Resp:  [8-27] 17  SpO2:  [88 %-100 %] 99 %  BP: ()/(0-72) 80/0  Arterial Line BP: (75-85)/(48-71) 85/48     Weight: 124.6 kg (274 lb 11.1 oz)  Body mass index is 36.24 kg/m².      Intake/Output Summary (Last 24 hours) at 03/16/18 0554  Last data filed at 03/16/18 0400   Gross per 24 hour   Intake             1414 ml   Output             2055 ml   Net             -641 ml       Physical Exam   Constitutional: He is oriented to person, place, and time. He appears well-developed and well-nourished.   HENT:   Head: Normocephalic and atraumatic.   Eyes: EOM are normal. Pupils are equal, round, and reactive to light.   Neck: Neck supple.   Cardiovascular:   Mechanical hum   Pulmonary/Chest:  Breath sounds normal. No respiratory distress. He has no wheezes.   Abdominal: Soft. He exhibits no distension. There is no tenderness.   Neurological: He is alert and oriented to person, place, and time.   Skin: Skin is warm and dry.       Vents:  Vent Mode: SIMV (03/11/18 0740)  Ventilator Initiated: Yes (03/08/18 1400)  Set Rate: 18 bmp (03/11/18 0740)  Vt Set: 550 mL (03/11/18 0740)  Pressure Support: 10 cmH20 (03/11/18 0740)  PEEP/CPAP: 5 cmH20 (03/11/18 0740)  Oxygen Concentration (%): 28 (03/14/18 2059)  Peak Airway Pressure: 26 cmH2O (03/11/18 0740)  Plateau Pressure: 0 cmH20 (03/11/18 0740)  Total Ve: 10.6 mL (03/11/18 0740)  F/VT Ratio<105 (RSBI): (!) 36.52 (03/11/18 0523)    Lines/Drains/Airways     Peripherally Inserted Central Catheter Line                 PICC Triple Lumen 03/13/18 1521 right basilic 2 days          Line                 VAD 03/08/18 1124 Left ventricular assist device HeartMate II 7 days          Peripheral Intravenous Line                 Midline Catheter Insertion/Assessment  - Single Lumen 03/07/18 1130 Right cephalic vein (lateral side of arm) 18g x 8cm 8 days                Significant Labs:    CBC/Anemia Profile:    Recent Labs  Lab 03/15/18  0430   WBC 17.28*   HGB 7.6*   HCT 23.3*      MCV 85   RDW 16.2*        Chemistries:    Recent Labs  Lab 03/15/18  0430 03/15/18  0852 03/15/18  1140 03/15/18  1733 03/15/18  2356    135*  --   --   --    K 4.0  4.0 3.8 4.2 4.0 3.9   CL 93* 91*  --   --   --    CO2 34* 32*  --   --   --    BUN 37* 37*  --   --   --    CREATININE 1.8* 1.9*  --   --   --    CALCIUM 9.9 10.2  --   --   --    ALBUMIN  --  2.9*  --   --   --    PROT  --  7.3  --   --   --    BILITOT  --  3.8*  --   --   --    ALKPHOS  --  174*  --   --   --    ALT  --  38  --   --   --    AST  --  58*  --   --   --    MG 2.1  --  2.1 2.1 2.0   PHOS 3.3  --   --   --   --        All pertinent labs within the past 24 hours have been reviewed.    Significant  Imaging:  I have reviewed and interpreted all pertinent imaging results/findings within the past 24 hours.    Assessment/Plan:     Chronic systolic heart failure    Neuro:   -off sedation  -pain controlled with oxycodone     Pulmonary:   - extubated 3/11  - Scheduled duo-nebs  - Nitric weaned  - R side PTX first noted after extubation 3/11, CXR 3/12 showed increased size, went to IR, IR saw smaller PTX, chest tube canceled, 3/13 PTX smaller in size on CXR.      Cardiac:  - s/p LVAD 3/8/18, outflow tract revision on 3/9, and chest closure 3/10  - Pressors: dobutamine  - Epi weaned off, wean pressors per CTS  - Amiodarone transitioned to PO     Renal:   - CKD; baseline Cr. 1.7  - WAGNER on 3/8 and 3/11, improving  - Lasix drip  - Monitor UOP     Infectious Disease:   -Leukocytosis trending up but with all cell lines, likely hemoconcentration from lasix.  -Low grade fevers on 3/8 after initial surgery, torsten to >38C on 3/10, afebrile since  -Blood cultures 3/6 NG, 3/11 NGTD  -vancomycin and doxycycline on 3/8   -cefazolin for surgery 3/9 and 3/10  -fluconazole, cefepime, bacitracin IM started 3/8, continuing     Hematology/Oncology:  - Trend CBC, transfuse per CTS  - iron supplementation  - On heparin drip, started warfarin 3/12     Endocrine:  - Insuline gtt  - Endocrine on board     Fluids/Electrolytes/Nutrition/GI:   - tolerating PO intake, advance as tolerated  - Trend CMP  - Replace Lytes PRN      Dispo:  Possible stepdown today. CTS primary.            Critical care was time spent personally by me on the following activities: development of treatment plan with patient or surrogate and bedside caregivers, discussions with consultants, evaluation of patient's response to treatment, examination of patient, ordering and performing treatments and interventions, ordering and review of laboratory studies, ordering and review of radiographic studies, pulse oximetry, re-evaluation of patient's condition.  This critical care  time did not overlap with that of any other provider or involve time for any procedures.     Fam Patel MD  Critical Care - Surgery  [facility]     I have personally performed a detailed history and physical examination on this patient. My findings are summarized in the resident's note included in the record.

## 2018-03-16 NOTE — PROCEDURES
Patient in chair. VAD interrogation completed this AM in the event changes needed to be made. Will continue to monitor for further issues.     Pulsatile: Yes, intermittent   VAD Sounds: Smooth  Problems / Issues / Alarms with VAD if any: PI event x 1      VAD Interrogation:  TXP LVAD INTERROGATIONS 3/16/2018 3/16/2018 3/16/2018 3/16/2018 3/16/2018 3/16/2018 3/16/2018   Type HeartMate II HeartMate II HeartMate II HeartMate II HeartMate II HeartMate II HeartMate II   Flow 6.1 5.8 6.1 - 5.8 6.2 5.9   Speed 9000 9000 9000 9000 9000 9000 9000   PI 5.1 5.8 5.4 5.6 5.7 5.5 5.4   Power ([place]) 6 5.7 5.8 5.7 5.6 5.7 5.6   LSL 8600 8600 8600 8600 8600 8600 8600   Pulsatility Pulse Pulse Intermittent pulse Intermittent pulse Intermittent pulse Intermittent pulse Intermittent pulse   }

## 2018-03-16 NOTE — SUBJECTIVE & OBJECTIVE
Subjective:     Post-Op Info:  Procedure(s) (LRB):  CLOSURE-STERNAL WOUND (N/A)  PLACEMENT-NEOPERICARDIUM   6 Days Post-Op        Interval History:   NAEON. Tbili decreasing. WBC high but stable, cultures negative.    Medications:  Continuous Infusions:   DOBUTamine 4 mcg/kg/min (03/16/18 0900)    epinephrine infusion Stopped (03/14/18 2100)    custom IV infusion builder (for pharmacist use only) 15 mL/hr at 03/16/18 0900    heparin (porcine) in 5 % dex 2,300 Units/hr (03/16/18 0913)     Scheduled Meds:   amiodarone  200 mg Oral Daily    docusate sodium  200 mg Oral QHS    ferrous gluconate  324 mg Oral Daily with breakfast    INV aspirin/placebo  81 mg Oral Daily    pantoprazole  40 mg Oral Daily    polyethylene glycol  17 g Oral Daily    potassium chloride SA  40 mEq Oral BID    sodium chloride 0.9%  10 mL Intravenous Q6H    sodium chloride 0.9%  3 mL Intravenous Q8H    warfarin  7.5 mg Oral Daily     PRN Meds:acetaminophen, albumin human 5%, albuterol sulfate, bisacodyl, dextrose 50%, dextrose 50%, glucagon (human recombinant), glucose, glucose, insulin aspart U-100, magnesium hydroxide 400 mg/5 ml, oxyCODONE, oxyCODONE, Flushing PICC Protocol **AND** sodium chloride 0.9% **AND** sodium chloride 0.9%     Objective:     Vital Signs (Most Recent):  Temp: 98.3 °F (36.8 °C) (03/16/18 0700)  Pulse: 95 (03/16/18 0945)  Resp: (!) 24 (03/16/18 0945)  BP: (!) 86/0 (03/16/18 0700)  SpO2: 99 % (03/16/18 0300) Vital Signs (24h Range):  Temp:  [97.7 °F (36.5 °C)-98.9 °F (37.2 °C)] 98.3 °F (36.8 °C)  Pulse:  [81-95] 95  Resp:  [8-27] 24  SpO2:  [88 %-99 %] 99 %  BP: ()/(0-72) 86/0       Intake/Output Summary (Last 24 hours) at 03/16/18 1003  Last data filed at 03/16/18 0700   Gross per 24 hour   Intake             2000 ml   Output             2375 ml   Net             -375 ml       Physical Exam   Constitutional: He is oriented to person, place, and time. He appears well-developed and well-nourished. No  distress.   Cardiovascular: Normal rate.    Mechanical hum   Pulmonary/Chest: Effort normal. No respiratory distress.   Abdominal: Soft. He exhibits no distension. There is no tenderness.   Neurological: He is alert and oriented to person, place, and time.   Skin: Skin is warm and dry.   Psychiatric: He has a normal mood and affect. His behavior is normal.       Significant Labs:  CBC:   Recent Labs  Lab 03/16/18  0455   WBC 17.76*   RBC 2.80*   HGB 7.6*   HCT 24.0*      MCV 86   MCH 27.1   MCHC 31.7*     CMP:   Recent Labs  Lab 03/16/18  0455   *   CALCIUM 10.1   ALBUMIN 2.7*   PROT 6.9   *   K 3.8  3.8   CO2 32*   CL 90*   BUN 36*   CREATININE 1.8*   ALKPHOS 167*   ALT 40   AST 54*   BILITOT 2.9*     Coagulation:   Recent Labs  Lab 03/16/18 0455 03/16/18  0732   INR 1.3*  --    APTT  --  38.4*       Significant Diagnostics:  CXR: I have reviewed all pertinent results/findings within the past 24 hours    Procedure: Device Interrogation Including analysis of device parameters  Current Settings: Ventricular Assist Device  Review of device function is stable  TXP LVAD INTERROGATIONS 3/16/2018 3/16/2018 3/16/2018 3/16/2018 3/16/2018 3/16/2018 3/16/2018   Type HeartMate II HeartMate II HeartMate II HeartMate II HeartMate II HeartMate II HeartMate II   Flow 5.9 5.9 5.9 5.9 5.8 5.8 6.0   Speed 9000 9000 9000 9000 9000 9000 9000   PI 5.4 5.8 4.9 5.3 5.1 5.2 5.2   Power (Jackson) 5.6 5.6 5.5 5.6 5.2 5.8 5.5   LSL 8600 8600 8600 8600 8600 8600 8600   Pulsatility Intermittent pulse Intermittent pulse Intermittent pulse Pulse Pulse Pulse Pulse

## 2018-03-16 NOTE — PT/OT/SLP PROGRESS
Speech Language Pathology Treatment  discharge    Patient Name:  Tim Richards   MRN:  [id number]  Admitting Diagnosis: LVAD (left ventricular assist device) present    Recommendations:                 General Recommendations:  Follow-up not indicated  Diet recommendations:  Regular, Liquid Diet Level: Thin   Aspiration Precautions: Feed only when awake/alert, HOB to 90 degrees and Standard aspiration precautions   General Precautions: Standard, fall, sternal, LVAD  Communication strategies:  none    Subjective     Awake/alert  Pain/Comfort:  · Pain Rating 1: 0/10  · Pain Rating Post-Intervention 1: 0/10    Objective:     Has the patient been evaluated by SLP for swallowing?   Yes  Keep patient NPO? No   Current Respiratory Status:    Room air    Pt upright in bedside chair upon entry. Pt tolerated suhail cracker x5 and thin liquids via straw x8 oz with timely swallow initiation and adequate oral clearance. No overt s/s of aspiration noted across PO trials. Pt denies any difficulty with PO intake and states he has been drinking lots of liquids and eating a little. SLP reviewed swallow precautions and diet recs with pt. Recommend regular diet/thin liquids at this time. No further ST recommended.     Assessment:     Tim Richards is a 52 y.o. male with an SLP diagnosis of Dysphagia.      Goals:    SLP Goals        Problem: SLP Goal    Goal Priority Disciplines Outcome   SLP Goal     SLP    Description:  Speech Language Pathology Goals  Goals expected to be met by 3/18    1. Pt will tolerate diet of thin liquids and soft solids without overt clinical signs of aspiration MET  2. Pt will tolerate trials of regular solids within speech therapy sessions to help determine least restrictive diet  MET                     Plan:     · Plan of Care reviewed with:  patient   · SLP Follow-Up:  No       Discharge recommendations:  home (likely no ST follow up )   Barriers to Discharge:  None    Time Tracking:     SLP  Treatment Date:   03/16/18  Speech Start Time:  1012  Speech Stop Time:  1020     Speech Total Time (min):  8 min    Billable Minutes: Treatment Swallowing Dysfunction 8    María Delgado CCC-SLP  03/16/2018

## 2018-03-17 LAB
ANION GAP SERPL CALC-SCNC: 13 MMOL/L
APTT BLDCRRT: 47.2 SEC
APTT BLDCRRT: 48 SEC
APTT BLDCRRT: 50 SEC
APTT BLDCRRT: 50 SEC
APTT BLDCRRT: 51.8 SEC
BACTERIA SPEC AEROBE CULT: NO GROWTH
BASOPHILS # BLD AUTO: 0.02 K/UL
BASOPHILS NFR BLD: 0.1 %
BUN SERPL-MCNC: 40 MG/DL
CALCIUM SERPL-MCNC: 9.9 MG/DL
CHLORIDE SERPL-SCNC: 89 MMOL/L
CO2 SERPL-SCNC: 31 MMOL/L
CREAT SERPL-MCNC: 1.9 MG/DL
DIFFERENTIAL METHOD: ABNORMAL
EOSINOPHIL # BLD AUTO: 0.1 K/UL
EOSINOPHIL NFR BLD: 0.6 %
ERYTHROCYTE [DISTWIDTH] IN BLOOD BY AUTOMATED COUNT: 16.3 %
EST. GFR  (AFRICAN AMERICAN): 46.2 ML/MIN/1.73 M^2
EST. GFR  (NON AFRICAN AMERICAN): 39.9 ML/MIN/1.73 M^2
FACT X PPP CHRO-ACNC: 0.34 IU/ML
FACT X PPP CHRO-ACNC: 0.34 IU/ML
FACT X PPP CHRO-ACNC: 0.35 IU/ML
GLUCOSE SERPL-MCNC: 120 MG/DL
GRAM STN SPEC: NORMAL
HCT VFR BLD AUTO: 23.4 %
HGB BLD-MCNC: 7.5 G/DL
IMM GRANULOCYTES # BLD AUTO: 0.38 K/UL
IMM GRANULOCYTES NFR BLD AUTO: 2.3 %
INR PPP: 1.4
LDH SERPL L TO P-CCNC: 400 U/L
LYMPHOCYTES # BLD AUTO: 1.4 K/UL
LYMPHOCYTES NFR BLD: 8 %
MAGNESIUM SERPL-MCNC: 1.9 MG/DL
MAGNESIUM SERPL-MCNC: 1.9 MG/DL
MAGNESIUM SERPL-MCNC: 2.1 MG/DL
MAGNESIUM SERPL-MCNC: 2.1 MG/DL
MCH RBC QN AUTO: 27.2 PG
MCHC RBC AUTO-ENTMCNC: 32.1 G/DL
MCV RBC AUTO: 85 FL
MONOCYTES # BLD AUTO: 1.5 K/UL
MONOCYTES NFR BLD: 8.6 %
NEUTROPHILS # BLD AUTO: 13.6 K/UL
NEUTROPHILS NFR BLD: 80.4 %
NRBC BLD-RTO: 0 /100 WBC
PHOSPHATE SERPL-MCNC: 4.1 MG/DL
PLATELET # BLD AUTO: 279 K/UL
PMV BLD AUTO: 10.8 FL
POCT GLUCOSE: 100 MG/DL (ref 70–110)
POCT GLUCOSE: 104 MG/DL (ref 70–110)
POCT GLUCOSE: 109 MG/DL (ref 70–110)
POCT GLUCOSE: 143 MG/DL (ref 70–110)
POCT GLUCOSE: 146 MG/DL (ref 70–110)
POTASSIUM SERPL-SCNC: 3.5 MMOL/L
POTASSIUM SERPL-SCNC: 3.6 MMOL/L
POTASSIUM SERPL-SCNC: 3.9 MMOL/L
PROTHROMBIN TIME: 14.3 SEC
RBC # BLD AUTO: 2.76 M/UL
SODIUM SERPL-SCNC: 133 MMOL/L
WBC # BLD AUTO: 16.88 K/UL

## 2018-03-17 PROCEDURE — 99232 SBSQ HOSP IP/OBS MODERATE 35: CPT | Mod: ,,, | Performed by: INTERNAL MEDICINE

## 2018-03-17 PROCEDURE — 63600175 PHARM REV CODE 636 W HCPCS: Performed by: THORACIC SURGERY (CARDIOTHORACIC VASCULAR SURGERY)

## 2018-03-17 PROCEDURE — 20600001 HC STEP DOWN PRIVATE ROOM

## 2018-03-17 PROCEDURE — 63600175 PHARM REV CODE 636 W HCPCS: Performed by: STUDENT IN AN ORGANIZED HEALTH CARE EDUCATION/TRAINING PROGRAM

## 2018-03-17 PROCEDURE — 84132 ASSAY OF SERUM POTASSIUM: CPT

## 2018-03-17 PROCEDURE — 27000248 HC VAD-ADDITIONAL DAY

## 2018-03-17 PROCEDURE — A4216 STERILE WATER/SALINE, 10 ML: HCPCS | Performed by: THORACIC SURGERY (CARDIOTHORACIC VASCULAR SURGERY)

## 2018-03-17 PROCEDURE — 25000003 PHARM REV CODE 250: Performed by: THORACIC SURGERY (CARDIOTHORACIC VASCULAR SURGERY)

## 2018-03-17 PROCEDURE — 80048 BASIC METABOLIC PNL TOTAL CA: CPT

## 2018-03-17 PROCEDURE — 97116 GAIT TRAINING THERAPY: CPT

## 2018-03-17 PROCEDURE — 97530 THERAPEUTIC ACTIVITIES: CPT

## 2018-03-17 PROCEDURE — 83615 LACTATE (LD) (LDH) ENZYME: CPT

## 2018-03-17 PROCEDURE — 84100 ASSAY OF PHOSPHORUS: CPT

## 2018-03-17 PROCEDURE — 85730 THROMBOPLASTIN TIME PARTIAL: CPT | Mod: 91

## 2018-03-17 PROCEDURE — 85025 COMPLETE CBC W/AUTO DIFF WBC: CPT

## 2018-03-17 PROCEDURE — 83735 ASSAY OF MAGNESIUM: CPT

## 2018-03-17 PROCEDURE — 25000003 PHARM REV CODE 250: Performed by: STUDENT IN AN ORGANIZED HEALTH CARE EDUCATION/TRAINING PROGRAM

## 2018-03-17 PROCEDURE — 85520 HEPARIN ASSAY: CPT | Mod: 91

## 2018-03-17 PROCEDURE — 85610 PROTHROMBIN TIME: CPT

## 2018-03-17 RX ADMIN — Medication 10 ML: at 12:03

## 2018-03-17 RX ADMIN — HEPARIN SODIUM 2300 UNITS/HR: 10000 INJECTION, SOLUTION INTRAVENOUS at 05:03

## 2018-03-17 RX ADMIN — Medication 10 ML: at 05:03

## 2018-03-17 RX ADMIN — HEPARIN SODIUM 2300 UNITS/HR: 10000 INJECTION, SOLUTION INTRAVENOUS at 06:03

## 2018-03-17 RX ADMIN — FERROUS GLUCONATE TAB 324 MG (37.5 MG ELEMENTAL IRON) 324 MG: 324 (37.5 FE) TAB at 08:03

## 2018-03-17 RX ADMIN — POTASSIUM CHLORIDE 40 MEQ: 1500 TABLET, EXTENDED RELEASE ORAL at 08:03

## 2018-03-17 RX ADMIN — Medication 3 ML: at 02:03

## 2018-03-17 RX ADMIN — DOCUSATE SODIUM 200 MG: 100 CAPSULE, LIQUID FILLED ORAL at 09:03

## 2018-03-17 RX ADMIN — DOBUTAMINE IN DEXTROSE 4 MCG/KG/MIN: 200 INJECTION, SOLUTION INTRAVENOUS at 05:03

## 2018-03-17 RX ADMIN — Medication 81 MG: at 08:03

## 2018-03-17 RX ADMIN — FUROSEMIDE: 10 INJECTION, SOLUTION INTRAMUSCULAR; INTRAVENOUS at 12:03

## 2018-03-17 RX ADMIN — PANTOPRAZOLE SODIUM 40 MG: 40 TABLET, DELAYED RELEASE ORAL at 08:03

## 2018-03-17 RX ADMIN — AMIODARONE HYDROCHLORIDE 200 MG: 200 TABLET ORAL at 08:03

## 2018-03-17 RX ADMIN — OXYCODONE HYDROCHLORIDE 5 MG: 5 TABLET ORAL at 08:03

## 2018-03-17 RX ADMIN — WARFARIN SODIUM 7.5 MG: 2.5 TABLET ORAL at 05:03

## 2018-03-17 RX ADMIN — POTASSIUM CHLORIDE 40 MEQ: 1500 TABLET, EXTENDED RELEASE ORAL at 09:03

## 2018-03-17 NOTE — PROGRESS NOTES
Pt transferred from ICU via wheelchair per ICU nurse. Pt placed on telemetry, oriented to room, VSS, NAD noted, bed in lowest locked position, side rails up x2, call system in reach.

## 2018-03-17 NOTE — PROGRESS NOTES
[facility]  Cardiothoracic Surgery  Progress Note    Patient Name: Tim Richards  MRN: [id number]  Admission Date: 3/1/2018  Hospital Length of Stay: 16 days  Code Status: Full Code   Attending Physician: Yg Kaufman MD   Referring Provider: Amy Rhodes MD  Principal Problem:LVAD (left ventricular assist device) present    Subjective:     Post-Op Info:  Procedure(s) (LRB):  CLOSURE-STERNAL WOUND (N/A)  PLACEMENT-NEOPERICARDIUM   7 Days Post-Op     Subjective:     Post-Op Info:  Procedure(s) (LRB):  CLOSURE-STERNAL WOUND (N/A)  PLACEMENT-NEOPERICARDIUM   7 Days Post-Op     Interval History: No acute events. Patient tolerating diet. +BM, ambulating.    Medications:  Continuous Infusions:   DOBUTamine 4 mcg/kg/min (03/17/18 0000)    custom IV infusion builder (for pharmacist use only) 15 mL/hr at 03/17/18 1223    heparin (porcine) in 5 % dex 2,300 Units/hr (03/17/18 0614)     Scheduled Meds:   amiodarone  200 mg Oral Daily    docusate sodium  200 mg Oral QHS    ferrous gluconate  324 mg Oral Daily with breakfast    INV aspirin/placebo  81 mg Oral Daily    pantoprazole  40 mg Oral Daily    polyethylene glycol  17 g Oral Daily    potassium chloride SA  40 mEq Oral BID    sodium chloride 0.9%  10 mL Intravenous Q6H    sodium chloride 0.9%  3 mL Intravenous Q8H    warfarin  7.5 mg Oral Daily     PRN Meds:acetaminophen, albuterol sulfate, bisacodyl, dextrose 50%, dextrose 50%, glucagon (human recombinant), glucose, glucose, insulin aspart U-100, magnesium hydroxide 400 mg/5 ml, oxyCODONE, oxyCODONE, Flushing PICC Protocol **AND** sodium chloride 0.9% **AND** sodium chloride 0.9%     Objective:     Vital Signs (Most Recent):  Temp: 98.7 °F (37.1 °C) (03/17/18 1209)  Pulse: 84 (03/17/18 1410)  Resp: 17 (03/17/18 1209)  BP: (!) 80/0 (03/17/18 1218)  SpO2: (!) 93 % (03/17/18 1209) Vital Signs (24h Range):  Temp:  [98.1 °F (36.7 °C)-99.3 °F (37.4 °C)] 98.7 °F (37.1 °C)  Pulse:  [80-91]  84  Resp:  [10-24] 17  SpO2:  [93 %-96 %] 93 %  BP: ()/(0-76) 80/0       Intake/Output Summary (Last 24 hours) at 03/17/18 1528  Last data filed at 03/17/18 1223   Gross per 24 hour   Intake             1339 ml   Output             3525 ml   Net            -2186 ml       Physical Exam   Constitutional: He is oriented to person, place, and time. He appears well-developed and well-nourished. No distress.   Cardiovascular: Normal rate.    Mechanical hum   Pulmonary/Chest: Effort normal. No respiratory distress.   Abdominal: Soft. He exhibits no distension. There is no tenderness.   Neurological: He is alert and oriented to person, place, and time.   Skin: Skin is warm and dry.   Psychiatric: He has a normal mood and affect. His behavior is normal.       Significant Labs:  BMP:   Recent Labs  Lab 03/17/18  0432 03/17/18  1237   *  --    *  --    K 3.5  3.5  3.5 3.9   CL 89*  --    CO2 31*  --    BUN 40*  --    CREATININE 1.9*  --    CALCIUM 9.9  --    MG 1.9  1.9 2.1     CBC:   Recent Labs  Lab 03/17/18  0432   WBC 16.88*   RBC 2.76*   HGB 7.5*   HCT 23.4*      MCV 85   MCH 27.2   MCHC 32.1     Coagulation:   Recent Labs  Lab 03/17/18  0432 03/17/18  1053   INR 1.4*  --    APTT 50.0*  50.0* 47.2*       Significant Diagnostics:  I have reviewed all pertinent imaging results/findings within the past 24 hours.    Procedure: Device Interrogation Including analysis of device parameters  Current Settings: Ventricular Assist Device  Review of device function is stable  TXP LVAD INTERROGATIONS 3/17/2018 3/17/2018 3/17/2018 3/17/2018 3/17/2018 3/17/2018 3/16/2018   Type HeartMate II HeartMate II HeartMate II HeartMate II HeartMate II HeartMate II HeartMate II   Flow 6.4 - - 5.7 6.5 7.3 6.   Speed 9000 - - 9000 9000 9000 9000   PI 5.1 - - `5.0 4.9 4.5 4.5   Power (Jackson) 5.9 - - 5.6 5.9 6.2 6.8   LSL - - - - 8600 8600 8600   Pulsatility - - - - No Pulse No Pulse Pulse     Assessment/Plan:     * LVAD  (left ventricular assist device) present    - Oxycodone PRN pain  - Cardiac diet  - Encourage IS, deep breathing, cough  - Daily chest xray  - Dobutamine at 4, po amio, no ASA (Prevent II trial)  - Heparin gtt, PTT goal 40-50 (currently at 2300 units/h); stop when INR is therapeutic  - Coumadin 7.5mg tonight, INR 1.4        Acute decompensated heart failure    S/p LVAD             Homa Bradley MD  Cardiothoracic Surgery  [facility]

## 2018-03-17 NOTE — ASSESSMENT & PLAN NOTE
-NICM   -Echo prior to VAD- EF: <10%; LVEDD: 9.7 cm  -now s/p HM II LVAD- most recent Echo 3/14/18- LVEDD 8.5  -diuresing with Lasix drip, evamines euvolemic, can probably transition to IVP

## 2018-03-17 NOTE — PLAN OF CARE
Problem: Physical Therapy Goal  Goal: Physical Therapy Goal  Goals to be met by: 3/26     Patient will increase functional independence with mobility by performin. Supine to sit with Stand-by Assistance-not met  2. Sit to supine with Stand-by Assistance-not met  3. Sit to stand transfer with Stand-by Assistance - met 3/16  4. Gait  x 100 feet with Stand-by Assistance - met    3/17  5. Pt sit to/from stand with supervision- not met  6. Pt receive gait training ~ 500 ft with supervision and no rest periods- not met  Goals updated for content and are appropriate. Nayana Alvarez, PT 3/17/2018

## 2018-03-17 NOTE — PROGRESS NOTES
[facility]  Heart Transplant  Progress Note    Patient Name: Tim Richards  MRN: [id number]  Admission Date: 3/1/2018  Hospital Length of Stay: 16 days  Attending Physician: Yg Kaufman MD  Primary Care Provider: Ja Méndez MD  Principal Problem:LVAD (left ventricular assist device) present    Subjective:     Interval History:    Seen and examined in bed  Ambulating egan   Feels well       Continuous Infusions:   DOBUTamine 4 mcg/kg/min (03/17/18 0000)    custom IV infusion builder (for pharmacist use only) 15 mL/hr at 03/17/18 1223    heparin (porcine) in 5 % dex 2,300 Units/hr (03/17/18 0614)     Scheduled Meds:   amiodarone  200 mg Oral Daily    docusate sodium  200 mg Oral QHS    ferrous gluconate  324 mg Oral Daily with breakfast    INV aspirin/placebo  81 mg Oral Daily    pantoprazole  40 mg Oral Daily    polyethylene glycol  17 g Oral Daily    potassium chloride SA  40 mEq Oral BID    sodium chloride 0.9%  10 mL Intravenous Q6H    sodium chloride 0.9%  3 mL Intravenous Q8H    warfarin  7.5 mg Oral Daily     PRN Meds:acetaminophen, albuterol sulfate, bisacodyl, dextrose 50%, dextrose 50%, glucagon (human recombinant), glucose, glucose, insulin aspart U-100, magnesium hydroxide 400 mg/5 ml, oxyCODONE, oxyCODONE, Flushing PICC Protocol **AND** sodium chloride 0.9% **AND** sodium chloride 0.9%    Review of patient's allergies indicates:   Allergen Reactions    Aspirin Other (See Comments)     Prevent II patient- Do NOT order aspirin. Please call Yenny [phone] if patient is admitted to hospital    Lisinopril Anaphylaxis     Objective:     Vital Signs (Most Recent):  Temp: 98.7 °F (37.1 °C) (03/17/18 1209)  Pulse: 84 (03/17/18 1500)  Resp: 17 (03/17/18 1209)  BP: (!) 80/0 (03/17/18 1218)  SpO2: (!) 93 % (03/17/18 1209) Vital Signs (24h Range):  Temp:  [98.1 °F (36.7 °C)-99.3 °F (37.4 °C)] 98.7 °F (37.1 °C)  Pulse:  [80-91] 84  Resp:  [13-24] 17  SpO2:  [93 %-96 %]  93 %  BP: ()/(0-76) 80/0     Patient Vitals for the past 72 hrs (Last 3 readings):   Weight   03/17/18 0700 110.3 kg (243 lb 2.7 oz)     Body mass index is 32.08 kg/m².      Intake/Output Summary (Last 24 hours) at 03/17/18 1657  Last data filed at 03/17/18 1600   Gross per 24 hour   Intake             1339 ml   Output             4225 ml   Net            -2886 ml       Hemodynamic Parameters:       Telemetry:     Physical Exam   Constitutional: He is oriented to person, place, and time. He appears well-developed and well-nourished.   HENT:   Head: Normocephalic and atraumatic.   Eyes: EOM are normal.   Neck: Normal range of motion.   Cardiovascular: Normal rate, regular rhythm and normal heart sounds.  Exam reveals no gallop and no friction rub.    No murmur heard.  Smooth vad hum    Pulmonary/Chest: Effort normal and breath sounds normal. No respiratory distress. He has no wheezes. He has no rales.   Abdominal: Soft. Bowel sounds are normal.   Musculoskeletal: Normal range of motion. He exhibits no edema.   Neurological: He is alert and oriented to person, place, and time. He has normal reflexes.       Significant Labs:  CBC:    Recent Labs  Lab 03/15/18  0430 03/16/18  0455 03/17/18  0432   WBC 17.28* 17.76* 16.88*   RBC 2.74* 2.80* 2.76*   HGB 7.6* 7.6* 7.5*   HCT 23.3* 24.0* 23.4*    300 279   MCV 85 86 85   MCH 27.7 27.1 27.2   MCHC 32.6 31.7* 32.1     BNP:    Recent Labs  Lab 03/12/18  0506 03/14/18  0440 03/16/18  0455   BNP 1,419* 1,324* 531*     CMP:    Recent Labs  Lab 03/14/18  0440  03/15/18  0852  03/16/18  0455  03/16/18  1756 03/17/18  0432 03/17/18  1237     < > 166*  --  135*  --   --  120*  --    CALCIUM 9.7  < > 10.2  --  10.1  --   --  9.9  --    ALBUMIN 2.6*  --  2.9*  --  2.7*  --   --   --   --    PROT 6.5  --  7.3  --  6.9  --   --   --   --      < > 135*  --  134*  --   --  133*  --    K 4.2  < > 3.8  < > 3.8  3.8  < > 3.9 3.5  3.5  3.5 3.9   CO2 31*  < > 32*   --  32*  --   --  31*  --    CL 96  < > 91*  --  90*  --   --  89*  --    BUN 37*  < > 37*  --  36*  --   --  40*  --    CREATININE 1.8*  < > 1.9*  --  1.8*  --   --  1.9*  --    ALKPHOS 120  --  174*  --  167*  --   --   --   --    ALT 29  --  38  --  40  --   --   --   --    AST 53*  --  58*  --  54*  --   --   --   --    BILITOT 3.7*  --  3.8*  --  2.9*  --   --   --   --    < > = values in this interval not displayed.   Coagulation:     Recent Labs  Lab 03/15/18  0430  03/16/18  0455  03/17/18  0051 03/17/18  0432 03/17/18  1053   INR 1.1  --  1.3*  --   --  1.4*  --    APTT  --   < >  --   < > 48.0* 50.0*  50.0* 47.2*   < > = values in this interval not displayed.  LDH:    Recent Labs  Lab 03/15/18  0430 03/16/18  0455 03/17/18  0432   * 418* 400*     Microbiology:  Microbiology Results (last 7 days)     Procedure Component Value Units Date/Time    Culture, Respiratory with Gram Stain [[id number]] Collected:  03/11/18 0800    Order Status:  Completed Specimen:  Respiratory from Endotracheal Aspirate Updated:  03/17/18 0931     Respiratory Culture No growth     Gram Stain (Respiratory) <10 epithelial cells per low power field.     Gram Stain (Respiratory) Few WBC's     Gram Stain (Respiratory) Rare Gram positive cocci     Gram Stain (Respiratory) Rare Gram positive rods    Urine culture [[id number]] Collected:  03/15/18 1431    Order Status:  Completed Specimen:  Urine from Urine, Clean Catch Updated:  03/16/18 2056     Urine Culture, Routine No growth    Blood culture [[id number]] Collected:  03/15/18 1358    Order Status:  Completed Specimen:  Blood from Peripheral, Hand, Left Updated:  03/16/18 1812     Blood Culture, Routine No Growth to date     Blood Culture, Routine No Growth to date    Blood culture [[id number]] Collected:  03/15/18 1407    Order Status:  Completed Specimen:  Blood from Peripheral, Antecubital, Left Updated:  03/16/18 1812     Blood Culture, Routine No Growth to date     Blood  Culture, Routine No Growth to date    Blood culture [[id number]] Collected:  03/11/18 0650    Order Status:  Completed Specimen:  Blood from Peripheral, Hand, Right Updated:  03/16/18 1012     Blood Culture, Routine No growth after 5 days.    Blood culture [[id number]] Collected:  03/11/18 0651    Order Status:  Completed Specimen:  Blood from Peripheral, Wrist, Right Updated:  03/16/18 1012     Blood Culture, Routine No growth after 5 days.    Blood culture [[id number]] Collected:  03/06/18 1107    Order Status:  Completed Specimen:  Blood Updated:  03/11/18 1612     Blood Culture, Routine No growth after 5 days.    Blood culture [[id number]] Collected:  03/06/18 1107    Order Status:  Completed Specimen:  Blood Updated:  03/11/18 1612     Blood Culture, Routine No growth after 5 days.          I have reviewed all pertinent labs within the past 24 hours.    Estimated Creatinine Clearance: 59.9 mL/min (A) (based on SCr of 1.9 mg/dL (H)).    Diagnostic Results:  I have reviewed and interpreted all pertinent imaging results/findings within the past 24 hours.    Assessment and Plan:     Mr. Richards is a 52 y.o. year old Black or  male who has presents as f/u from hospitalization for ADHF after presenting to clinic 1/10/17 as initial consult. advanced surgical options (LVAD/OHT).    This 51 yo BM has had CHF since 2011 at which time an angiogram did not demonstrate any CAD.  He is on amiodarone for VT. He was admitted from 1/12-1/17/18 (see d/c summary) where he was treated for ADHF and initiation of w/u for advanced options. RHC during that admission showed RA 17 and PCWP 35 as well as severely reduced CI 1.6. Though not optimized, he was discharged per his request so as not to lose his job/insurance--see Dr. Hadley's attestation for full details on d/c summary. Since d/c, his Scr has risen from 2.0 on discharge to 2.8 (1/23/18). His BNP had declined to 1040.  He presents today for scheduled hospital f/u.       Today, he reports feeling well. He says he has more energy than usual. His only complaints are cramping and xerosis. He denies n/v/d, no CP, palpitations or syncope. He has stable orthostatic dizziness/LH. No PND or orthopnea. His COLEMAN is improved from discharge and his weight is down about 5-7 pounds on his home scale. Of note, his Scr on labs from 2 days ago showed Scr 2.8 up from 2.0. T. Bili was down from 1.1 to 0.6 and BNP was down to 1040 from 1700.  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 No labs initially ordered but I ordered and sent him down.     * LVAD (left ventricular assist device) present    -HeartMate II Implanted 3/8/18 as DT with repositioning of outflow graft 3/9/18.  S/p chest closure 3/10/18  -CTS Primary  -Implanted by Dr. Kaufman  -Anticoagulation per Primary Coumadin  -Antiplatelets; Patient is enrolled in PREVENT II  -Speed set at 9000, LSL 8600 rpm  -Not listed for OHTx  - On ; Now off Marilia and EPi  -On Lasix drip. CVP 7 this am. Recommend decreasing Lasix drip            Pneumothorax    -IR unable to place chest tube or pig tail; monitor daily CXR        Anemia    -Transfused a unit PRBC's 3/12  -H/H stable        Hyperbilirubinemia    -likely secondary to above, trending down  -RUQ ultrasound neg        CKD (chronic kidney disease), stage III    - cr 1.8 today  -Baseline creat 1.6-2.0.        PVT (paroxysmal ventricular tachycardia)    -has ICD  -Transitioned from IV Amiodarone infusion to po         Acute on chronic combined systolic and diastolic heart failure    -NICM   -Echo prior to VAD- EF: <10%; LVEDD: 9.7 cm  -now s/p HM II LVAD- most recent Echo 3/14/18- LVEDD 8.5  -diuresing with Lasix drip, evamines euvolemic, can probably transition to IVP           Biventricular implantable cardioverter-defibrillator in situ    - in place  - ICD interrogated, event noted on 2/25/18 with appropriate shock for VT  -Continue Amiodarone po            Mike Douglas  DO  Heart Transplant  [facility]

## 2018-03-17 NOTE — NURSING TRANSFER
Nursing Transfer Note      3/17/2018     Transfer To: 302    Transfer via wheelchair    Transfer with cardiac monitor.     Transported by EMILY Best and LAKEISHA moran    Medicines sent: Lasix, heparin and dobutamine infusing. PO medications sent in bag with patient.   Chart send with patient: yes    Notified: patient notified wife    Patient reassessed at: full assessment performed 3/16 and 2305 (date, time)    Upon arrival to floor: cardiac monitor applied, patient oriented to room, call bell in reach and bed in lowest position. Report called - updates given at bedside.

## 2018-03-17 NOTE — ASSESSMENT & PLAN NOTE
- Oxycodone PRN pain  - Cardiac diet  - Encourage IS, deep breathing, cough  - Daily chest xray  - Dobutamine at 4, po amio, no ASA (Prevent II trial)  - Heparin gtt, PTT goal 40-50 (currently at 2300 units/h); stop when INR is therapeutic  - Coumadin 7.5mg tonight, INR 1.4

## 2018-03-17 NOTE — PLAN OF CARE
Problem: Patient Care Overview  Goal: Plan of Care Review  Outcome: Ongoing (interventions implemented as appropriate)  Patient remains free of falls or injury. Patient denies pain. Patient stepped down from SICU this shift. MS incision with aquacel dressing CDI. Continued on heparin, lasix, and dobutamine drips. Wife being checked on LVAD DLES dressing change. Patient and wife to be checked off on alarms. Patient working with PT/OT. CVPs daily; current 8. Plan of care reviewed with patient.

## 2018-03-17 NOTE — PROGRESS NOTES
LVAD dressing change done by patient's wife with RN observing. Moderate amount of drainage noted to old drain sponge. No active bleeding. Sutures intact. Patient wife performed supply setup correctly, maintained sterile technique and performed steps in correct order. Wife not yet checked off. Will continue to monitor.

## 2018-03-17 NOTE — PT/OT/SLP PROGRESS
Physical Therapy Treatment    Patient Name:  Tim Richards   MRN:  [id number]    Recommendations:     Discharge Recommendations:   (no further PT will be needed.)   Discharge Equipment Recommendations: none   Barriers to discharge: None    Assessment:     Tim Richards is a 52 y.o. male admitted with a medical diagnosis of LVAD (left ventricular assist device) present.  He presents with the following impairments/functional limitations:  impaired functional mobilty, gait instability, impaired endurance pt meredith treatment better being able to gait train farther. Pt will benefit from cont skilled PT 6x/wk to progress physically and return pt to Independent status. Pt will be able to discharge home when medically stable and will not need further PT or DME. Pt is s/p HM 3 placement 3/8, revision of outflow graft 3/9, chest closure 3/10/18. .    Rehab Prognosis:  good; patient would benefit from acute skilled PT services to address these deficits and reach maximum level of function.      Recent Surgery: Procedure(s) (LRB):  CLOSURE-STERNAL WOUND (N/A)  PLACEMENT-NEOPERICARDIUM 7 Days Post-Op    Plan:     During this hospitalization, patient to be seen 6 x/week to address the above listed problems via gait training, therapeutic activities, therapeutic exercises  · Plan of Care Expires:  04/11/18   Plan of Care Reviewed with: patient    Subjective     Communicated with nurse prior to session.  Patient found  Supine in bed upon PT entry to room, agreeable to treatment.      Chief Complaint: pt c/o pain in chest during treatment.   Patient comments/goals: to get better and go home.   Pain/Comfort:  · Pain Rating 1: 4/10  · Location 1:  (chest)  · Pain Rating Post-Intervention 1: 4/10    Patients cultural, spiritual, Religion conflicts given the current situation: none    Objective:     Patient found with: telemetry, PICC line, LVAD     General Precautions: Standard, fall, LVAD, sternal   Orthopedic Precautions:N/A    Braces:       Functional Mobility:  · Bed Mobility:     · Rolling Left:  supervision  · Supine to Sit: supervision  ·   · Transfers:     · Sit to Stand:  supervision with no AD pt stood x 2 reps.   ·   · Gait: 500 ft with 1 sitting rest period. pt was SBA with gait training and had emergency bag present.       AM-PAC 6 CLICK MOBILITY  Turning over in bed (including adjusting bedclothes, sheets and blankets)?: 4  Sitting down on and standing up from a chair with arms (e.g., wheelchair, bedside commode, etc.): 3  Moving from lying on back to sitting on the side of the bed?: 4  Moving to and from a bed to a chair (including a wheelchair)?: 4  Need to walk in hospital room?: 3  Climbing 3-5 steps with a railing?: 3  Total Score: 21       Therapeutic Activities and Exercises:   pt was min assist verbal cues and min assist for switching from LVAD power base to battery.     Patient left up in chair with all lines intact and call button in reach..    GOALS:    Physical Therapy Goals        Problem: Physical Therapy Goal    Goal Priority Disciplines Outcome Goal Variances Interventions   Physical Therapy Goal     PT/OT, PT Ongoing (interventions implemented as appropriate)     Description:  Goals to be met by: 3/26     Patient will increase functional independence with mobility by performin. Supine to sit with Stand-by Assistance-not met  2. Sit to supine with Stand-by Assistance-not met  3. Sit to stand transfer with Stand-by Assistance - met 3/16  4. Gait  x 100 feet with Stand-by Assistance - met    3/17  5. Pt sit to/from stand with supervision- not met  6. Pt receive gait training ~ 500 ft with supervision and no rest periods- not met                    Time Tracking:     PT Received On: 18  PT Start Time: 1053     PT Stop Time: 1119  PT Total Time (min): 26 min     Billable Minutes: Gait Training 16 min and Therapeutic Activity 10 min    Treatment Type: Treatment  PT/PTA: PT     PTA Visit Number: 0      Nayana Alvarez, PT  03/17/2018

## 2018-03-17 NOTE — CONSULTS
Consult received re: nutritional assessment. RD is following pt, last assessed 3/16. Will post recs below. Will continue to follow-up as previously scheduled.     Recommendations  Recommendation/Intervention:   1. Encourage increased PO intake as tolerated.  2. Recommend adding Boost Glucose Control OS (patient would like strawberry) to all meals until appetite increases.               -If vitamin K content a concern, recommend Optisource VHP OS instead.  3. CSU RD to follow-up with cardiac and coumadin diet education when appropriate.   RD to monitor.

## 2018-03-17 NOTE — SUBJECTIVE & OBJECTIVE
Interval History:    Seen and examined in bed  Ambulating egan   Feels well       Continuous Infusions:   DOBUTamine 4 mcg/kg/min (03/17/18 0000)    custom IV infusion builder (for pharmacist use only) 15 mL/hr at 03/17/18 1223    heparin (porcine) in 5 % dex 2,300 Units/hr (03/17/18 0614)     Scheduled Meds:   amiodarone  200 mg Oral Daily    docusate sodium  200 mg Oral QHS    ferrous gluconate  324 mg Oral Daily with breakfast    INV aspirin/placebo  81 mg Oral Daily    pantoprazole  40 mg Oral Daily    polyethylene glycol  17 g Oral Daily    potassium chloride SA  40 mEq Oral BID    sodium chloride 0.9%  10 mL Intravenous Q6H    sodium chloride 0.9%  3 mL Intravenous Q8H    warfarin  7.5 mg Oral Daily     PRN Meds:acetaminophen, albuterol sulfate, bisacodyl, dextrose 50%, dextrose 50%, glucagon (human recombinant), glucose, glucose, insulin aspart U-100, magnesium hydroxide 400 mg/5 ml, oxyCODONE, oxyCODONE, Flushing PICC Protocol **AND** sodium chloride 0.9% **AND** sodium chloride 0.9%    Review of patient's allergies indicates:   Allergen Reactions    Aspirin Other (See Comments)     Prevent II patient- Do NOT order aspirin. Please call Yenny [phone] if patient is admitted to hospital    Lisinopril Anaphylaxis     Objective:     Vital Signs (Most Recent):  Temp: 98.7 °F (37.1 °C) (03/17/18 1209)  Pulse: 84 (03/17/18 1500)  Resp: 17 (03/17/18 1209)  BP: (!) 80/0 (03/17/18 1218)  SpO2: (!) 93 % (03/17/18 1209) Vital Signs (24h Range):  Temp:  [98.1 °F (36.7 °C)-99.3 °F (37.4 °C)] 98.7 °F (37.1 °C)  Pulse:  [80-91] 84  Resp:  [13-24] 17  SpO2:  [93 %-96 %] 93 %  BP: ()/(0-76) 80/0     Patient Vitals for the past 72 hrs (Last 3 readings):   Weight   03/17/18 0700 110.3 kg (243 lb 2.7 oz)     Body mass index is 32.08 kg/m².      Intake/Output Summary (Last 24 hours) at 03/17/18 1657  Last data filed at 03/17/18 1600   Gross per 24 hour   Intake             1339 ml   Output             4225 ml    Net            -2886 ml       Hemodynamic Parameters:       Telemetry:     Physical Exam   Constitutional: He is oriented to person, place, and time. He appears well-developed and well-nourished.   HENT:   Head: Normocephalic and atraumatic.   Eyes: EOM are normal.   Neck: Normal range of motion.   Cardiovascular: Normal rate, regular rhythm and normal heart sounds.  Exam reveals no gallop and no friction rub.    No murmur heard.  Smooth vad hum    Pulmonary/Chest: Effort normal and breath sounds normal. No respiratory distress. He has no wheezes. He has no rales.   Abdominal: Soft. Bowel sounds are normal.   Musculoskeletal: Normal range of motion. He exhibits no edema.   Neurological: He is alert and oriented to person, place, and time. He has normal reflexes.       Significant Labs:  CBC:    Recent Labs  Lab 03/15/18  0430 03/16/18  0455 03/17/18  0432   WBC 17.28* 17.76* 16.88*   RBC 2.74* 2.80* 2.76*   HGB 7.6* 7.6* 7.5*   HCT 23.3* 24.0* 23.4*    300 279   MCV 85 86 85   MCH 27.7 27.1 27.2   MCHC 32.6 31.7* 32.1     BNP:    Recent Labs  Lab 03/12/18  0506 03/14/18  0440 03/16/18  0455   BNP 1,419* 1,324* 531*     CMP:    Recent Labs  Lab 03/14/18  0440  03/15/18  0852  03/16/18  0455  03/16/18  1756 03/17/18  0432 03/17/18  1237     < > 166*  --  135*  --   --  120*  --    CALCIUM 9.7  < > 10.2  --  10.1  --   --  9.9  --    ALBUMIN 2.6*  --  2.9*  --  2.7*  --   --   --   --    PROT 6.5  --  7.3  --  6.9  --   --   --   --      < > 135*  --  134*  --   --  133*  --    K 4.2  < > 3.8  < > 3.8  3.8  < > 3.9 3.5  3.5  3.5 3.9   CO2 31*  < > 32*  --  32*  --   --  31*  --    CL 96  < > 91*  --  90*  --   --  89*  --    BUN 37*  < > 37*  --  36*  --   --  40*  --    CREATININE 1.8*  < > 1.9*  --  1.8*  --   --  1.9*  --    ALKPHOS 120  --  174*  --  167*  --   --   --   --    ALT 29  --  38  --  40  --   --   --   --    AST 53*  --  58*  --  54*  --   --   --   --    BILITOT 3.7*  --  3.8*   --  2.9*  --   --   --   --    < > = values in this interval not displayed.   Coagulation:     Recent Labs  Lab 03/15/18  0430  03/16/18  0455  03/17/18  0051 03/17/18  0432 03/17/18  1053   INR 1.1  --  1.3*  --   --  1.4*  --    APTT  --   < >  --   < > 48.0* 50.0*  50.0* 47.2*   < > = values in this interval not displayed.  LDH:    Recent Labs  Lab 03/15/18  0430 03/16/18  0455 03/17/18  0432   * 418* 400*     Microbiology:  Microbiology Results (last 7 days)     Procedure Component Value Units Date/Time    Culture, Respiratory with Gram Stain [[id number]] Collected:  03/11/18 0800    Order Status:  Completed Specimen:  Respiratory from Endotracheal Aspirate Updated:  03/17/18 0931     Respiratory Culture No growth     Gram Stain (Respiratory) <10 epithelial cells per low power field.     Gram Stain (Respiratory) Few WBC's     Gram Stain (Respiratory) Rare Gram positive cocci     Gram Stain (Respiratory) Rare Gram positive rods    Urine culture [[id number]] Collected:  03/15/18 1431    Order Status:  Completed Specimen:  Urine from Urine, Clean Catch Updated:  03/16/18 2056     Urine Culture, Routine No growth    Blood culture [[id number]] Collected:  03/15/18 1358    Order Status:  Completed Specimen:  Blood from Peripheral, Hand, Left Updated:  03/16/18 1812     Blood Culture, Routine No Growth to date     Blood Culture, Routine No Growth to date    Blood culture [[id number]] Collected:  03/15/18 1407    Order Status:  Completed Specimen:  Blood from Peripheral, Antecubital, Left Updated:  03/16/18 1812     Blood Culture, Routine No Growth to date     Blood Culture, Routine No Growth to date    Blood culture [[id number]] Collected:  03/11/18 0650    Order Status:  Completed Specimen:  Blood from Peripheral, Hand, Right Updated:  03/16/18 1012     Blood Culture, Routine No growth after 5 days.    Blood culture [[id number]] Collected:  03/11/18 0651    Order Status:  Completed Specimen:  Blood from Peripheral,  Wrist, Right Updated:  03/16/18 1012     Blood Culture, Routine No growth after 5 days.    Blood culture [[id number]] Collected:  03/06/18 1107    Order Status:  Completed Specimen:  Blood Updated:  03/11/18 1612     Blood Culture, Routine No growth after 5 days.    Blood culture [[id number]] Collected:  03/06/18 1107    Order Status:  Completed Specimen:  Blood Updated:  03/11/18 1612     Blood Culture, Routine No growth after 5 days.          I have reviewed all pertinent labs within the past 24 hours.    Estimated Creatinine Clearance: 59.9 mL/min (A) (based on SCr of 1.9 mg/dL (H)).    Diagnostic Results:  I have reviewed and interpreted all pertinent imaging results/findings within the past 24 hours.

## 2018-03-18 LAB
ALLENS TEST: ABNORMAL
ANION GAP SERPL CALC-SCNC: 11 MMOL/L
APTT BLDCRRT: 49.2 SEC
APTT BLDCRRT: 50.3 SEC
APTT BLDCRRT: 51.7 SEC
APTT BLDCRRT: 51.7 SEC
APTT BLDCRRT: >150 SEC
BASOPHILS # BLD AUTO: 0.02 K/UL
BASOPHILS NFR BLD: 0.1 %
BUN SERPL-MCNC: 40 MG/DL
CALCIUM SERPL-MCNC: 9.6 MG/DL
CHLORIDE SERPL-SCNC: 91 MMOL/L
CO2 SERPL-SCNC: 31 MMOL/L
CREAT SERPL-MCNC: 2.1 MG/DL
DIFFERENTIAL METHOD: ABNORMAL
EOSINOPHIL # BLD AUTO: 0.1 K/UL
EOSINOPHIL NFR BLD: 0.6 %
ERYTHROCYTE [DISTWIDTH] IN BLOOD BY AUTOMATED COUNT: 16.1 %
EST. GFR  (AFRICAN AMERICAN): 40.9 ML/MIN/1.73 M^2
EST. GFR  (NON AFRICAN AMERICAN): 35.4 ML/MIN/1.73 M^2
FACT X PPP CHRO-ACNC: 0.27 IU/ML
FACT X PPP CHRO-ACNC: 0.31 IU/ML
FACT X PPP CHRO-ACNC: 0.78 IU/ML
GLUCOSE SERPL-MCNC: 105 MG/DL
HCO3 UR-SCNC: 31.1 MMOL/L (ref 24–28)
HCT VFR BLD AUTO: 25.1 %
HGB BLD-MCNC: 7.9 G/DL
IMM GRANULOCYTES # BLD AUTO: 0.31 K/UL
IMM GRANULOCYTES NFR BLD AUTO: 1.7 %
INR PPP: 1.6
LDH SERPL L TO P-CCNC: 443 U/L
LYMPHOCYTES # BLD AUTO: 1.5 K/UL
LYMPHOCYTES NFR BLD: 8.5 %
MAGNESIUM SERPL-MCNC: 2 MG/DL
MAGNESIUM SERPL-MCNC: 2.1 MG/DL
MAGNESIUM SERPL-MCNC: 2.2 MG/DL
MAGNESIUM SERPL-MCNC: 2.2 MG/DL
MCH RBC QN AUTO: 26.9 PG
MCHC RBC AUTO-ENTMCNC: 31.5 G/DL
MCV RBC AUTO: 85 FL
MONOCYTES # BLD AUTO: 1.8 K/UL
MONOCYTES NFR BLD: 9.9 %
NEUTROPHILS # BLD AUTO: 14.1 K/UL
NEUTROPHILS NFR BLD: 79.2 %
NRBC BLD-RTO: 0 /100 WBC
PCO2 BLDA: 45.4 MMHG (ref 35–45)
PH SMN: 7.44 [PH] (ref 7.35–7.45)
PHOSPHATE SERPL-MCNC: 4.1 MG/DL
PLATELET # BLD AUTO: 291 K/UL
PMV BLD AUTO: 11.2 FL
PO2 BLDA: 27 MMHG (ref 40–60)
POC BE: 7 MMOL/L
POC SATURATED O2: 52 % (ref 95–100)
POC TCO2: 32 MMOL/L (ref 24–29)
POCT GLUCOSE: 100 MG/DL (ref 70–110)
POCT GLUCOSE: 117 MG/DL (ref 70–110)
POCT GLUCOSE: 122 MG/DL (ref 70–110)
POCT GLUCOSE: 142 MG/DL (ref 70–110)
POTASSIUM SERPL-SCNC: 3.7 MMOL/L
POTASSIUM SERPL-SCNC: 3.8 MMOL/L
PROTHROMBIN TIME: 15.6 SEC
RBC # BLD AUTO: 2.94 M/UL
SAMPLE: ABNORMAL
SITE: ABNORMAL
SODIUM SERPL-SCNC: 133 MMOL/L
WBC # BLD AUTO: 17.76 K/UL

## 2018-03-18 PROCEDURE — 85025 COMPLETE CBC W/AUTO DIFF WBC: CPT

## 2018-03-18 PROCEDURE — 85730 THROMBOPLASTIN TIME PARTIAL: CPT | Mod: 91

## 2018-03-18 PROCEDURE — 27000248 HC VAD-ADDITIONAL DAY

## 2018-03-18 PROCEDURE — 82803 BLOOD GASES ANY COMBINATION: CPT

## 2018-03-18 PROCEDURE — 84100 ASSAY OF PHOSPHORUS: CPT

## 2018-03-18 PROCEDURE — 20600001 HC STEP DOWN PRIVATE ROOM

## 2018-03-18 PROCEDURE — 25000003 PHARM REV CODE 250: Performed by: STUDENT IN AN ORGANIZED HEALTH CARE EDUCATION/TRAINING PROGRAM

## 2018-03-18 PROCEDURE — 85730 THROMBOPLASTIN TIME PARTIAL: CPT

## 2018-03-18 PROCEDURE — 83735 ASSAY OF MAGNESIUM: CPT | Mod: 91

## 2018-03-18 PROCEDURE — 85610 PROTHROMBIN TIME: CPT

## 2018-03-18 PROCEDURE — 97535 SELF CARE MNGMENT TRAINING: CPT

## 2018-03-18 PROCEDURE — 63600175 PHARM REV CODE 636 W HCPCS: Performed by: THORACIC SURGERY (CARDIOTHORACIC VASCULAR SURGERY)

## 2018-03-18 PROCEDURE — 99900035 HC TECH TIME PER 15 MIN (STAT)

## 2018-03-18 PROCEDURE — 83735 ASSAY OF MAGNESIUM: CPT

## 2018-03-18 PROCEDURE — 25000003 PHARM REV CODE 250: Performed by: THORACIC SURGERY (CARDIOTHORACIC VASCULAR SURGERY)

## 2018-03-18 PROCEDURE — 63600175 PHARM REV CODE 636 W HCPCS: Performed by: STUDENT IN AN ORGANIZED HEALTH CARE EDUCATION/TRAINING PROGRAM

## 2018-03-18 PROCEDURE — 85520 HEPARIN ASSAY: CPT | Mod: 91

## 2018-03-18 PROCEDURE — A4216 STERILE WATER/SALINE, 10 ML: HCPCS | Performed by: THORACIC SURGERY (CARDIOTHORACIC VASCULAR SURGERY)

## 2018-03-18 PROCEDURE — 99232 SBSQ HOSP IP/OBS MODERATE 35: CPT | Mod: ,,, | Performed by: INTERNAL MEDICINE

## 2018-03-18 PROCEDURE — 83615 LACTATE (LD) (LDH) ENZYME: CPT

## 2018-03-18 PROCEDURE — 84132 ASSAY OF SERUM POTASSIUM: CPT | Mod: 91

## 2018-03-18 PROCEDURE — 80048 BASIC METABOLIC PNL TOTAL CA: CPT

## 2018-03-18 RX ADMIN — Medication 81 MG: at 09:03

## 2018-03-18 RX ADMIN — HEPARIN SODIUM 2000 UNITS/HR: 10000 INJECTION, SOLUTION INTRAVENOUS at 07:03

## 2018-03-18 RX ADMIN — FUROSEMIDE: 10 INJECTION, SOLUTION INTRAMUSCULAR; INTRAVENOUS at 09:03

## 2018-03-18 RX ADMIN — FUROSEMIDE: 10 INJECTION, SOLUTION INTRAMUSCULAR; INTRAVENOUS at 05:03

## 2018-03-18 RX ADMIN — POTASSIUM CHLORIDE 40 MEQ: 1500 TABLET, EXTENDED RELEASE ORAL at 09:03

## 2018-03-18 RX ADMIN — WARFARIN SODIUM 7.5 MG: 2.5 TABLET ORAL at 05:03

## 2018-03-18 RX ADMIN — HEPARIN SODIUM 2200 UNITS/HR: 10000 INJECTION, SOLUTION INTRAVENOUS at 05:03

## 2018-03-18 RX ADMIN — POTASSIUM CHLORIDE 40 MEQ: 1500 TABLET, EXTENDED RELEASE ORAL at 08:03

## 2018-03-18 RX ADMIN — Medication 10 ML: at 06:03

## 2018-03-18 RX ADMIN — Medication 10 ML: at 11:03

## 2018-03-18 RX ADMIN — DOCUSATE SODIUM 200 MG: 100 CAPSULE, LIQUID FILLED ORAL at 09:03

## 2018-03-18 RX ADMIN — DOBUTAMINE IN DEXTROSE 4 MCG/KG/MIN: 200 INJECTION, SOLUTION INTRAVENOUS at 11:03

## 2018-03-18 RX ADMIN — Medication 3 ML: at 02:03

## 2018-03-18 RX ADMIN — PANTOPRAZOLE SODIUM 40 MG: 40 TABLET, DELAYED RELEASE ORAL at 08:03

## 2018-03-18 RX ADMIN — AMIODARONE HYDROCHLORIDE 200 MG: 200 TABLET ORAL at 08:03

## 2018-03-18 RX ADMIN — FERROUS GLUCONATE TAB 324 MG (37.5 MG ELEMENTAL IRON) 324 MG: 324 (37.5 FE) TAB at 08:03

## 2018-03-18 NOTE — PROGRESS NOTES
Dr. Bradley notified of patient's aPTT 51.7, goal 40-50. Orders to decrease heparin drip to 2,000 units/hr. No further orders given. Will continue to monitor.

## 2018-03-18 NOTE — PLAN OF CARE
Problem: Patient Care Overview  Goal: Individualization & Mutuality  PMHx:CHF, DCM, CKD, gout, HTN, V. Tach, AICD, colonoscopy   1/12-1/17: txtmt for ADHF. RHC      3/1: Admitted for LVAD placement   3/8: LVAD placement  3/9: washout chest closure  3/12: 1 u PRBC  314: abdominal ultrasound, echo, oob to chair    APTT goal 40-50        Outcome: Ongoing (interventions implemented as appropriate)  Pt remains on Heprin reduced to  2200 22mL/hr due to aPTT 51.8. Watched wife do dressing change.  Pt remains free of falls and injury.

## 2018-03-18 NOTE — SUBJECTIVE & OBJECTIVE
Interval History:   Seen and examined in bed   No complaints   No events overnight     Continuous Infusions:   DOBUTamine 4 mcg/kg/min (03/17/18 1716)    custom IV infusion builder (for pharmacist use only) 15 mL/hr at 03/18/18 0542    heparin (porcine) in 5 % dex 2,000 Units/hr (03/18/18 0659)     Scheduled Meds:   amiodarone  200 mg Oral Daily    docusate sodium  200 mg Oral QHS    ferrous gluconate  324 mg Oral Daily with breakfast    INV aspirin/placebo  81 mg Oral Daily    pantoprazole  40 mg Oral Daily    polyethylene glycol  17 g Oral Daily    potassium chloride SA  40 mEq Oral BID    sodium chloride 0.9%  10 mL Intravenous Q6H    sodium chloride 0.9%  3 mL Intravenous Q8H    warfarin  7.5 mg Oral Daily     PRN Meds:acetaminophen, albuterol sulfate, bisacodyl, dextrose 50%, dextrose 50%, glucagon (human recombinant), glucose, glucose, insulin aspart U-100, magnesium hydroxide 400 mg/5 ml, oxyCODONE, oxyCODONE, Flushing PICC Protocol **AND** sodium chloride 0.9% **AND** sodium chloride 0.9%    Review of patient's allergies indicates:   Allergen Reactions    Aspirin Other (See Comments)     Prevent II patient- Do NOT order aspirin. Please call Yenny [phone] if patient is admitted to hospital    Lisinopril Anaphylaxis     Objective:     Vital Signs (Most Recent):  Temp: 98.6 °F (37 °C) (03/18/18 0810)  Pulse: 86 (03/18/18 1000)  Resp: 18 (03/18/18 0810)  BP: (!) 72/0 (03/18/18 0811)  SpO2: (!) 93 % (03/18/18 0810) Vital Signs (24h Range):  Temp:  [98.6 °F (37 °C)-98.9 °F (37.2 °C)] 98.6 °F (37 °C)  Pulse:  [73-96] 86  Resp:  [16-18] 18  SpO2:  [93 %-98 %] 93 %  BP: (72-93)/(0-65) 72/0     Patient Vitals for the past 72 hrs (Last 3 readings):   Weight   03/18/18 0700 109.6 kg (241 lb 10 oz)   03/17/18 0700 110.3 kg (243 lb 2.7 oz)     Body mass index is 31.88 kg/m².      Intake/Output Summary (Last 24 hours) at 03/18/18 1054  Last data filed at 03/18/18 0800   Gross per 24 hour   Intake               240 ml   Output             3125 ml   Net            -2885 ml       Hemodynamic Parameters:       Telemetry:     Physical Exam   Constitutional: He is oriented to person, place, and time. He appears well-developed and well-nourished.   HENT:   Head: Normocephalic and atraumatic.   Eyes: EOM are normal. Pupils are equal, round, and reactive to light.   Neck: Normal range of motion.   Cardiovascular: Normal rate, regular rhythm and normal heart sounds.  Exam reveals no gallop and no friction rub.    No murmur heard.  Pulmonary/Chest: Effort normal and breath sounds normal. No respiratory distress. He has no wheezes. He has no rales.   Abdominal: Soft. Bowel sounds are normal.   Musculoskeletal: Normal range of motion. He exhibits no edema.   Neurological: He is alert and oriented to person, place, and time. He has normal reflexes.   Nursing note and vitals reviewed.      Significant Labs:  CBC:    Recent Labs  Lab 03/16/18  0455 03/17/18  0432 03/18/18  0326   WBC 17.76* 16.88* 17.76*   RBC 2.80* 2.76* 2.94*   HGB 7.6* 7.5* 7.9*   HCT 24.0* 23.4* 25.1*    279 291   MCV 86 85 85   MCH 27.1 27.2 26.9*   MCHC 31.7* 32.1 31.5*     BNP:    Recent Labs  Lab 03/12/18  0506 03/14/18  0440 03/16/18  0455   BNP 1,419* 1,324* 531*     CMP:    Recent Labs  Lab 03/14/18  0440  03/15/18  0852  03/16/18  0455  03/17/18  0432 03/17/18  1237 03/17/18  1727 03/18/18  0326     < > 166*  --  135*  --  120*  --   --  105   CALCIUM 9.7  < > 10.2  --  10.1  --  9.9  --   --  9.6   ALBUMIN 2.6*  --  2.9*  --  2.7*  --   --   --   --   --    PROT 6.5  --  7.3  --  6.9  --   --   --   --   --      < > 135*  --  134*  --  133*  --   --  133*   K 4.2  < > 3.8  < > 3.8  3.8  < > 3.5  3.5  3.5 3.9 3.6 3.7  3.7   CO2 31*  < > 32*  --  32*  --  31*  --   --  31*   CL 96  < > 91*  --  90*  --  89*  --   --  91*   BUN 37*  < > 37*  --  36*  --  40*  --   --  40*   CREATININE 1.8*  < > 1.9*  --  1.8*  --  1.9*  --   --  2.1*    ALKPHOS 120  --  174*  --  167*  --   --   --   --   --    ALT 29  --  38  --  40  --   --   --   --   --    AST 53*  --  58*  --  54*  --   --   --   --   --    BILITOT 3.7*  --  3.8*  --  2.9*  --   --   --   --   --    < > = values in this interval not displayed.   Coagulation:     Recent Labs  Lab 03/16/18  0455  03/17/18  0432  03/17/18  1727 03/17/18  2300 03/18/18  0326   INR 1.3*  --  1.4*  --   --   --  1.6*   APTT  --   < > 50.0*  50.0*  < > 51.8* >150.0* 51.7*  51.7*   < > = values in this interval not displayed.  LDH:    Recent Labs  Lab 03/16/18  0455 03/17/18  0432 03/18/18  0326   * 400* 443*     Microbiology:  Microbiology Results (last 7 days)     Procedure Component Value Units Date/Time    Blood culture [[id number]] Collected:  03/15/18 1358    Order Status:  Completed Specimen:  Blood from Peripheral, Hand, Left Updated:  03/17/18 1812     Blood Culture, Routine No Growth to date     Blood Culture, Routine No Growth to date     Blood Culture, Routine No Growth to date    Blood culture [[id number]] Collected:  03/15/18 1407    Order Status:  Completed Specimen:  Blood from Peripheral, Antecubital, Left Updated:  03/17/18 1812     Blood Culture, Routine No Growth to date     Blood Culture, Routine No Growth to date     Blood Culture, Routine No Growth to date    Culture, Respiratory with Gram Stain [[id number]] Collected:  03/11/18 0800    Order Status:  Completed Specimen:  Respiratory from Endotracheal Aspirate Updated:  03/17/18 0931     Respiratory Culture No growth     Gram Stain (Respiratory) <10 epithelial cells per low power field.     Gram Stain (Respiratory) Few WBC's     Gram Stain (Respiratory) Rare Gram positive cocci     Gram Stain (Respiratory) Rare Gram positive rods    Urine culture [[id number]] Collected:  03/15/18 1431    Order Status:  Completed Specimen:  Urine from Urine, Clean Catch Updated:  03/16/18 2056     Urine Culture, Routine No growth    Blood culture  [[id number]] Collected:  03/11/18 0650    Order Status:  Completed Specimen:  Blood from Peripheral, Hand, Right Updated:  03/16/18 1012     Blood Culture, Routine No growth after 5 days.    Blood culture [[id number]] Collected:  03/11/18 0651    Order Status:  Completed Specimen:  Blood from Peripheral, Wrist, Right Updated:  03/16/18 1012     Blood Culture, Routine No growth after 5 days.    Blood culture [[id number]] Collected:  03/06/18 1107    Order Status:  Completed Specimen:  Blood Updated:  03/11/18 1612     Blood Culture, Routine No growth after 5 days.    Blood culture [[id number]] Collected:  03/06/18 1107    Order Status:  Completed Specimen:  Blood Updated:  03/11/18 1612     Blood Culture, Routine No growth after 5 days.          I have reviewed all pertinent labs within the past 24 hours.    Estimated Creatinine Clearance: 54 mL/min (A) (based on SCr of 2.1 mg/dL (H)).    Diagnostic Results:  I have reviewed and interpreted all pertinent imaging results/findings within the past 24 hours.

## 2018-03-18 NOTE — PROGRESS NOTES
[facility]  Heart Transplant  Progress Note    Patient Name: Tim Richards  MRN: [id number]  Admission Date: 3/1/2018  Hospital Length of Stay: 17 days  Attending Physician: Yg Kaufman MD  Primary Care Provider: Ja Méndez MD  Principal Problem:LVAD (left ventricular assist device) present    Subjective:     Interval History:   Seen and examined in bed   No complaints   No events overnight     Continuous Infusions:   DOBUTamine 4 mcg/kg/min (03/17/18 1716)    custom IV infusion builder (for pharmacist use only) 15 mL/hr at 03/18/18 0542    heparin (porcine) in 5 % dex 2,000 Units/hr (03/18/18 0659)     Scheduled Meds:   amiodarone  200 mg Oral Daily    docusate sodium  200 mg Oral QHS    ferrous gluconate  324 mg Oral Daily with breakfast    INV aspirin/placebo  81 mg Oral Daily    pantoprazole  40 mg Oral Daily    polyethylene glycol  17 g Oral Daily    potassium chloride SA  40 mEq Oral BID    sodium chloride 0.9%  10 mL Intravenous Q6H    sodium chloride 0.9%  3 mL Intravenous Q8H    warfarin  7.5 mg Oral Daily     PRN Meds:acetaminophen, albuterol sulfate, bisacodyl, dextrose 50%, dextrose 50%, glucagon (human recombinant), glucose, glucose, insulin aspart U-100, magnesium hydroxide 400 mg/5 ml, oxyCODONE, oxyCODONE, Flushing PICC Protocol **AND** sodium chloride 0.9% **AND** sodium chloride 0.9%    Review of patient's allergies indicates:   Allergen Reactions    Aspirin Other (See Comments)     Prevent II patient- Do NOT order aspirin. Please call Yenny [phone] if patient is admitted to hospital    Lisinopril Anaphylaxis     Objective:     Vital Signs (Most Recent):  Temp: 98.6 °F (37 °C) (03/18/18 0810)  Pulse: 86 (03/18/18 1000)  Resp: 18 (03/18/18 0810)  BP: (!) 72/0 (03/18/18 0811)  SpO2: (!) 93 % (03/18/18 0810) Vital Signs (24h Range):  Temp:  [98.6 °F (37 °C)-98.9 °F (37.2 °C)] 98.6 °F (37 °C)  Pulse:  [73-96] 86  Resp:  [16-18] 18  SpO2:  [93 %-98 %]  93 %  BP: (72-93)/(0-65) 72/0     Patient Vitals for the past 72 hrs (Last 3 readings):   Weight   03/18/18 0700 109.6 kg (241 lb 10 oz)   03/17/18 0700 110.3 kg (243 lb 2.7 oz)     Body mass index is 31.88 kg/m².      Intake/Output Summary (Last 24 hours) at 03/18/18 1054  Last data filed at 03/18/18 0800   Gross per 24 hour   Intake              240 ml   Output             3125 ml   Net            -2885 ml       Hemodynamic Parameters:       Telemetry:     Physical Exam   Constitutional: He is oriented to person, place, and time. He appears well-developed and well-nourished.   HENT:   Head: Normocephalic and atraumatic.   Eyes: EOM are normal. Pupils are equal, round, and reactive to light.   Neck: Normal range of motion.   Cardiovascular: Normal rate, regular rhythm and normal heart sounds.  Exam reveals no gallop and no friction rub.    No murmur heard.  Pulmonary/Chest: Effort normal and breath sounds normal. No respiratory distress. He has no wheezes. He has no rales.   Abdominal: Soft. Bowel sounds are normal.   Musculoskeletal: Normal range of motion. He exhibits no edema.   Neurological: He is alert and oriented to person, place, and time. He has normal reflexes.   Nursing note and vitals reviewed.      Significant Labs:  CBC:    Recent Labs  Lab 03/16/18  0455 03/17/18  0432 03/18/18  0326   WBC 17.76* 16.88* 17.76*   RBC 2.80* 2.76* 2.94*   HGB 7.6* 7.5* 7.9*   HCT 24.0* 23.4* 25.1*    279 291   MCV 86 85 85   MCH 27.1 27.2 26.9*   MCHC 31.7* 32.1 31.5*     BNP:    Recent Labs  Lab 03/12/18  0506 03/14/18  0440 03/16/18  0455   BNP 1,419* 1,324* 531*     CMP:    Recent Labs  Lab 03/14/18  0440  03/15/18  0852  03/16/18  0455  03/17/18  0432 03/17/18  1237 03/17/18  1727 03/18/18  0326     < > 166*  --  135*  --  120*  --   --  105   CALCIUM 9.7  < > 10.2  --  10.1  --  9.9  --   --  9.6   ALBUMIN 2.6*  --  2.9*  --  2.7*  --   --   --   --   --    PROT 6.5  --  7.3  --  6.9  --   --   --    --   --      < > 135*  --  134*  --  133*  --   --  133*   K 4.2  < > 3.8  < > 3.8  3.8  < > 3.5  3.5  3.5 3.9 3.6 3.7  3.7   CO2 31*  < > 32*  --  32*  --  31*  --   --  31*   CL 96  < > 91*  --  90*  --  89*  --   --  91*   BUN 37*  < > 37*  --  36*  --  40*  --   --  40*   CREATININE 1.8*  < > 1.9*  --  1.8*  --  1.9*  --   --  2.1*   ALKPHOS 120  --  174*  --  167*  --   --   --   --   --    ALT 29  --  38  --  40  --   --   --   --   --    AST 53*  --  58*  --  54*  --   --   --   --   --    BILITOT 3.7*  --  3.8*  --  2.9*  --   --   --   --   --    < > = values in this interval not displayed.   Coagulation:     Recent Labs  Lab 03/16/18  0455  03/17/18  0432  03/17/18  1727 03/17/18  2300 03/18/18  0326   INR 1.3*  --  1.4*  --   --   --  1.6*   APTT  --   < > 50.0*  50.0*  < > 51.8* >150.0* 51.7*  51.7*   < > = values in this interval not displayed.  LDH:    Recent Labs  Lab 03/16/18 0455 03/17/18  0432 03/18/18  0326   * 400* 443*     Microbiology:  Microbiology Results (last 7 days)     Procedure Component Value Units Date/Time    Blood culture [[id number]] Collected:  03/15/18 1358    Order Status:  Completed Specimen:  Blood from Peripheral, Hand, Left Updated:  03/17/18 1812     Blood Culture, Routine No Growth to date     Blood Culture, Routine No Growth to date     Blood Culture, Routine No Growth to date    Blood culture [[id number]] Collected:  03/15/18 1407    Order Status:  Completed Specimen:  Blood from Peripheral, Antecubital, Left Updated:  03/17/18 1812     Blood Culture, Routine No Growth to date     Blood Culture, Routine No Growth to date     Blood Culture, Routine No Growth to date    Culture, Respiratory with Gram Stain [[id number]] Collected:  03/11/18 0800    Order Status:  Completed Specimen:  Respiratory from Endotracheal Aspirate Updated:  03/17/18 0931     Respiratory Culture No growth     Gram Stain (Respiratory) <10 epithelial cells per low power field.     Gram  Stain (Respiratory) Few WBC's     Gram Stain (Respiratory) Rare Gram positive cocci     Gram Stain (Respiratory) Rare Gram positive rods    Urine culture [[id number]] Collected:  03/15/18 1431    Order Status:  Completed Specimen:  Urine from Urine, Clean Catch Updated:  03/16/18 2056     Urine Culture, Routine No growth    Blood culture [[id number]] Collected:  03/11/18 0650    Order Status:  Completed Specimen:  Blood from Peripheral, Hand, Right Updated:  03/16/18 1012     Blood Culture, Routine No growth after 5 days.    Blood culture [[id number]] Collected:  03/11/18 0651    Order Status:  Completed Specimen:  Blood from Peripheral, Wrist, Right Updated:  03/16/18 1012     Blood Culture, Routine No growth after 5 days.    Blood culture [[id number]] Collected:  03/06/18 1107    Order Status:  Completed Specimen:  Blood Updated:  03/11/18 1612     Blood Culture, Routine No growth after 5 days.    Blood culture [[id number]] Collected:  03/06/18 1107    Order Status:  Completed Specimen:  Blood Updated:  03/11/18 1612     Blood Culture, Routine No growth after 5 days.          I have reviewed all pertinent labs within the past 24 hours.    Estimated Creatinine Clearance: 54 mL/min (A) (based on SCr of 2.1 mg/dL (H)).    Diagnostic Results:  I have reviewed and interpreted all pertinent imaging results/findings within the past 24 hours.    Assessment and Plan:     Mr. Richards is a 52 y.o. year old Black or  male who has presents as f/u from hospitalization for ADHF after presenting to clinic 1/10/17 as initial consult. advanced surgical options (LVAD/OHT).    This 53 yo BM has had CHF since 2011 at which time an angiogram did not demonstrate any CAD.  He is on amiodarone for VT. He was admitted from 1/12-1/17/18 (see d/c summary) where he was treated for ADHF and initiation of w/u for advanced options. RHC during that admission showed RA 17 and PCWP 35 as well as severely reduced CI 1.6. Though not  optimized, he was discharged per his request so as not to lose his job/insurance--see Dr. Hadley's attestation for full details on d/c summary. Since d/c, his Scr has risen from 2.0 on discharge to 2.8 (1/23/18). His BNP had declined to 1040.  He presents today for scheduled hospital f/u.      Today, he reports feeling well. He says he has more energy than usual. His only complaints are cramping and xerosis. He denies n/v/d, no CP, palpitations or syncope. He has stable orthostatic dizziness/LH. No PND or orthopnea. His COLEMAN is improved from discharge and his weight is down about 5-7 pounds on his home scale. Of note, his Scr on labs from 2 days ago showed Scr 2.8 up from 2.0. T. Bili was down from 1.1 to 0.6 and BNP was down to 1040 from 1700.  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 No labs initially ordered but I ordered and sent him down.     * LVAD (left ventricular assist device) present    -HeartMate II Implanted 3/8/18 as DT with repositioning of outflow graft 3/9/18.  S/p chest closure 3/10/18  -CTS Primary  -Implanted by Dr. Kaufman  -Anticoagulation per Primary Coumadin  -Antiplatelets; Patient is enrolled in PREVENT II  -Speed set at 9000, LSL 8600 rpm  -Not listed for OHTx  - On ; Now off Marilia and EPi  -On Lasix drip. CVP 7 this am. Recommend decreasing Lasix drip            Pneumothorax    -IR unable to place chest tube or pig tail; monitor daily CXR        Anemia    -Transfused a unit PRBC's 3/12  -H/H stable        Hyperbilirubinemia    -likely secondary to above, trending down  -RUQ ultrasound neg        CKD (chronic kidney disease), stage III    -creatinine has increased slightly to above baseline   -Baseline creat 1.6-2.0.  -appears euvolemic , would consider cutting back diuretic dose         PVT (paroxysmal ventricular tachycardia)    -has ICD  -Transitioned from IV Amiodarone infusion to po         Acute on chronic combined systolic and diastolic heart failure    -NICM    -Echo prior to VAD- EF: <10%; LVEDD: 9.7 cm  -now s/p HM II LVAD- most recent Echo 3/14/18- LVEDD 8.5  -diuresing with Lasix drip, evamines euvolemic, can probably transition to IVP           Biventricular implantable cardioverter-defibrillator in situ    - in place  - ICD interrogated, event noted on 2/25/18 with appropriate shock for VT  -Continue Amiodarone po            Mike Douglas DO  Heart Transplant  [facility]

## 2018-03-18 NOTE — PROGRESS NOTES
[facility]  Cardiothoracic Surgery  Progress Note    Patient Name: Tim Richards  MRN: [id number]  Admission Date: 3/1/2018  Hospital Length of Stay: 17 days  Code Status: Full Code   Attending Physician: Yg Kaufman MD   Referring Provider: Amy Rhodes MD  Principal Problem:LVAD (left ventricular assist device) present    Subjective:     Post-Op Info:  Procedure(s) (LRB):  CLOSURE-STERNAL WOUND (N/A)  PLACEMENT-NEOPERICARDIUM   8 Days Post-Op     Subjective:     Post-Op Info:  Procedure(s) (LRB):  CLOSURE-STERNAL WOUND (N/A)  PLACEMENT-NEOPERICARDIUM   8 Days Post-Op      Interval History: No acute events    Medications:  Continuous Infusions:   DOBUTamine 4 mcg/kg/min (03/18/18 1138)    custom IV infusion builder (for pharmacist use only) 15 mL/hr at 03/18/18 0542    heparin (porcine) in 5 % dex 2,000 Units/hr (03/18/18 0659)     Scheduled Meds:   amiodarone  200 mg Oral Daily    docusate sodium  200 mg Oral QHS    ferrous gluconate  324 mg Oral Daily with breakfast    INV aspirin/placebo  81 mg Oral Daily    pantoprazole  40 mg Oral Daily    polyethylene glycol  17 g Oral Daily    potassium chloride SA  40 mEq Oral BID    sodium chloride 0.9%  10 mL Intravenous Q6H    sodium chloride 0.9%  3 mL Intravenous Q8H    warfarin  7.5 mg Oral Daily     PRN Meds:acetaminophen, albuterol sulfate, bisacodyl, dextrose 50%, dextrose 50%, glucagon (human recombinant), glucose, glucose, insulin aspart U-100, magnesium hydroxide 400 mg/5 ml, oxyCODONE, oxyCODONE, Flushing PICC Protocol **AND** sodium chloride 0.9% **AND** sodium chloride 0.9%     Objective:     Vital Signs (Most Recent):  Temp: 98.6 °F (37 °C) (03/18/18 0810)  Pulse: 86 (03/18/18 1000)  Resp: 18 (03/18/18 0810)  BP: (!) 72/0 (03/18/18 0811)  SpO2: (!) 93 % (03/18/18 0810) Vital Signs (24h Range):  Temp:  [98.6 °F (37 °C)-98.9 °F (37.2 °C)] 98.6 °F (37 °C)  Pulse:  [73-96] 86  Resp:  [16-18] 18  SpO2:  [93 %-98 %] 93  %  BP: (72-93)/(0-65) 72/0       Intake/Output Summary (Last 24 hours) at 03/18/18 1145  Last data filed at 03/18/18 0800   Gross per 24 hour   Intake              240 ml   Output             3125 ml   Net            -2885 ml       Physical Exam   Constitutional: He is oriented to person, place, and time. He appears well-developed and well-nourished. No distress.   Cardiovascular: Normal rate.    Mechanical hum   Pulmonary/Chest: Effort normal. No respiratory distress.   Abdominal: Soft. He exhibits no distension. There is no tenderness.   Neurological: He is alert and oriented to person, place, and time.   Skin: Skin is warm and dry.   Psychiatric: He has a normal mood and affect. His behavior is normal.       Significant Labs:  BMP:   Recent Labs  Lab 03/18/18  0326      *   K 3.7  3.7   CL 91*   CO2 31*   BUN 40*   CREATININE 2.1*   CALCIUM 9.6   MG 2.2  2.2     CBC:   Recent Labs  Lab 03/18/18  0326   WBC 17.76*   RBC 2.94*   HGB 7.9*   HCT 25.1*      MCV 85   MCH 26.9*   MCHC 31.5*     Coagulation:   Recent Labs  Lab 03/18/18  0326   INR 1.6*   APTT 51.7*  51.7*       Significant Diagnostics:  I have reviewed all pertinent imaging results/findings within the past 24 hours.    Procedure: Device Interrogation Including analysis of device parameters  Current Settings: Ventricular Assist Device  Review of device function is stable  TXP LVAD INTERROGATIONS 3/18/2018 3/18/2018 3/17/2018 3/17/2018 3/17/2018 3/17/2018 3/17/2018   Type HeartMate II HeartMate II HeartMate II HeartMate II HeartMate II HeartMate II HeartMate II   Flow 6.5 6.1 6.6 6.3 7.3 6.4 -   Speed 9000 9000 9000 9000 9000 9000 -   PI 5.4 5.6 4.9 4.9 4.3 5.1 -   Power ([place]) 5.9 5.8 5.9 5.7 6.5 5.9 -   LSL - 8600 8600 8600 - - -   Pulsatility - No Pulse No Pulse No Pulse - - -     Assessment/Plan:     * LVAD (left ventricular assist device) present    - Oxycodone PRN pain  - Cardiac diet  - Encourage IS, deep breathing, cough  -  Daily chest xray  - Dobutamine at 4, po amio, no ASA (Prevent II trial)  - Heparin gtt, PTT goal 40-50; stop when INR is therapeutic  - Keep Coumadin dose at 7.5mg tonight, INR 1.6 today        Acute decompensated heart failure    S/p LVAD             Homa Bradley MD  Cardiothoracic Surgery  [facility]

## 2018-03-18 NOTE — PLAN OF CARE
Problem: Occupational Therapy Goal  Goal: Occupational Therapy Goal  Goals to be met by: 3/26/18     Patient will increase functional independence with ADLs by performing:    Feeding with [place]. MET  UE Dressing with Supervision.   LE Dressing with Supervision.   Grooming while standing at sink with Supervision. MET  Toileting from toilet with Supervision for hygiene and clothing management.   Toilet transfer to toilet with Supervision.  Pt will be (I) with VAD management during ADL.     Outcome: Ongoing (interventions implemented as appropriate)  Goals reviewed and continue to remain appropriate.

## 2018-03-18 NOTE — PROGRESS NOTES
Patient wife performed LVAD dressing change with no issues. Wife checked off on dressing change. Drive line site with large amount of drainage. No active drainage noted. Sutures intact. Will continue to monitor.

## 2018-03-18 NOTE — PLAN OF CARE
Problem: Patient Care Overview  Goal: Plan of Care Review  Outcome: Ongoing (interventions implemented as appropriate)  Patient remains free of falls or injury. Patient denies pain. MS incision with aquacel dressing CDI. Continued on heparin, lasix, and dobutamine drips. Wife being checked off on LVAD DLES dressing change. Patient and wife to be checked off on alarms. Patient working with PT/OT. CVPs daily. Plan of care reviewed with patient.

## 2018-03-18 NOTE — ASSESSMENT & PLAN NOTE
-creatinine has increased slightly to above baseline   -Baseline creat 1.6-2.0.  -appears euvolemic , would consider cutting back diuretic dose

## 2018-03-18 NOTE — PROGRESS NOTES
Dr. Douglas notified of CVP of 9 done this morning and SVO2 just drawn of 52. Will continue to monitor.

## 2018-03-18 NOTE — SUBJECTIVE & OBJECTIVE
Subjective:     Post-Op Info:  Procedure(s) (LRB):  CLOSURE-STERNAL WOUND (N/A)  PLACEMENT-NEOPERICARDIUM   8 Days Post-Op      Interval History: No acute events    Medications:  Continuous Infusions:   DOBUTamine 4 mcg/kg/min (03/18/18 1138)    custom IV infusion builder (for pharmacist use only) 15 mL/hr at 03/18/18 0542    heparin (porcine) in 5 % dex 2,000 Units/hr (03/18/18 0659)     Scheduled Meds:   amiodarone  200 mg Oral Daily    docusate sodium  200 mg Oral QHS    ferrous gluconate  324 mg Oral Daily with breakfast    INV aspirin/placebo  81 mg Oral Daily    pantoprazole  40 mg Oral Daily    polyethylene glycol  17 g Oral Daily    potassium chloride SA  40 mEq Oral BID    sodium chloride 0.9%  10 mL Intravenous Q6H    sodium chloride 0.9%  3 mL Intravenous Q8H    warfarin  7.5 mg Oral Daily     PRN Meds:acetaminophen, albuterol sulfate, bisacodyl, dextrose 50%, dextrose 50%, glucagon (human recombinant), glucose, glucose, insulin aspart U-100, magnesium hydroxide 400 mg/5 ml, oxyCODONE, oxyCODONE, Flushing PICC Protocol **AND** sodium chloride 0.9% **AND** sodium chloride 0.9%     Objective:     Vital Signs (Most Recent):  Temp: 98.6 °F (37 °C) (03/18/18 0810)  Pulse: 86 (03/18/18 1000)  Resp: 18 (03/18/18 0810)  BP: (!) 72/0 (03/18/18 0811)  SpO2: (!) 93 % (03/18/18 0810) Vital Signs (24h Range):  Temp:  [98.6 °F (37 °C)-98.9 °F (37.2 °C)] 98.6 °F (37 °C)  Pulse:  [73-96] 86  Resp:  [16-18] 18  SpO2:  [93 %-98 %] 93 %  BP: (72-93)/(0-65) 72/0       Intake/Output Summary (Last 24 hours) at 03/18/18 1145  Last data filed at 03/18/18 0800   Gross per 24 hour   Intake              240 ml   Output             3125 ml   Net            -2885 ml       Physical Exam   Constitutional: He is oriented to person, place, and time. He appears well-developed and well-nourished. No distress.   Cardiovascular: Normal rate.    Mechanical hum   Pulmonary/Chest: Effort normal. No respiratory distress.    Abdominal: Soft. He exhibits no distension. There is no tenderness.   Neurological: He is alert and oriented to person, place, and time.   Skin: Skin is warm and dry.   Psychiatric: He has a normal mood and affect. His behavior is normal.       Significant Labs:  BMP:   Recent Labs  Lab 03/18/18  0326      *   K 3.7  3.7   CL 91*   CO2 31*   BUN 40*   CREATININE 2.1*   CALCIUM 9.6   MG 2.2  2.2     CBC:   Recent Labs  Lab 03/18/18 0326   WBC 17.76*   RBC 2.94*   HGB 7.9*   HCT 25.1*      MCV 85   MCH 26.9*   MCHC 31.5*     Coagulation:   Recent Labs  Lab 03/18/18 0326   INR 1.6*   APTT 51.7*  51.7*       Significant Diagnostics:  I have reviewed all pertinent imaging results/findings within the past 24 hours.    Procedure: Device Interrogation Including analysis of device parameters  Current Settings: Ventricular Assist Device  Review of device function is stable  TXP LVAD INTERROGATIONS 3/18/2018 3/18/2018 3/17/2018 3/17/2018 3/17/2018 3/17/2018 3/17/2018   Type HeartMate II HeartMate II HeartMate II HeartMate II HeartMate II HeartMate II HeartMate II   Flow 6.5 6.1 6.6 6.3 7.3 6.4 -   Speed 9000 9000 9000 9000 9000 9000 -   PI 5.4 5.6 4.9 4.9 4.3 5.1 -   Power ([place]) 5.9 5.8 5.9 5.7 6.5 5.9 -   LSL - 8600 8600 8600 - - -   Pulsatility - No Pulse No Pulse No Pulse - - -

## 2018-03-18 NOTE — PROGRESS NOTES
Dr. Dowd notified of patient's aPTT 51.7, goal 40-50. No change to heparin drip at this time. Will continue to monitor.

## 2018-03-18 NOTE — ASSESSMENT & PLAN NOTE
- Oxycodone PRN pain  - Cardiac diet  - Encourage IS, deep breathing, cough  - Daily chest xray  - Dobutamine at 4, po amio, no ASA (Prevent II trial)  - Heparin gtt, PTT goal 40-50; stop when INR is therapeutic  - Keep Coumadin dose at 7.5mg tonight, INR 1.6 today

## 2018-03-18 NOTE — PT/OT/SLP PROGRESS
Occupational Therapy   Treatment    Name: Tim Richards  MRN: [id number]  Admitting Diagnosis:  LVAD (left ventricular assist device) present  8 Days Post-Op    Recommendations:     Discharge Recommendations: home  Discharge Equipment Recommendations:  none  Barriers to discharge:  None    Subjective     Communicated with: RN (marlon) prior to session. Pt agreeable to OT treatment session.  Pain/Comfort:  · Pain Rating 1: 0/10  · Pain Rating Post-Intervention 1: 0/10    Patients cultural, spiritual, Rastafari conflicts given the current situation: none reported     Objective:     Patient found with: telemetry, PICC line, LVAD (on wall power)    General Precautions: Standard, fall, LVAD, sternal   Orthopedic Precautions:N/A   Braces: N/A     Occupational Performance:    Bed Mobility:    · Patient completed Scooting/Bridging with supervision  · Patient completed Supine to Sit with supervision     Functional Mobility/Transfers:  · Patient completed Sit <> Stand Transfer with supervision  with  no assistive device   · From EOB with bed in lowest position  · Patient completed Bed <> Chair Transfer using Step Transfer technique with stand by assistance with no assistive device  · Functional Mobility: Pt engaging in functional mobility to simulate household distances from bed to bathroom and back approx 20 ft with SBA, increased time required, and without any noted LOB. Pt politely declining further mobility 2/2 increased fatigue.     Activities of Daily Living:  · Toileting: supervision  · Standing to urinate in the toilet for approx 30 seconds without support   · Grooming: supervision (to stand at sink to wash face)  · Pt in standing unsupported for approx 1 minute and declining brushing teeth at that time as he reported increased fatigue and need to take a seated rest break, with return to bedside chair  · UB Dressing: moderate assistance (to don LVAD holster)  · Seated EOB to barbara with assist in placing batteries in  properly and standing to adjust waist strap     Patient left up in chair with all lines intact, call button in reach, RN notified and LVAD on battery power with waist strap housing controller and margater donned    [facility] 6 Click:  [facility] Total Score: 22    Treatment & Education:  -Pt found supine in bed with LVAD on wall power reporting he was exceptionally fatigued after his walk yesterday with Physical Therapy  -Pt seated at EOB with to switch from wall to battery power with supervision and min vc's for completion of transfer safely  -Trial use of holster this session with mod A to place items in correctly, barbara, and apply waist strap for controller; pt reporting he prefers use over consolidation bag; will coordinate ordering a vest for use  -Recorded numbers in binder and completed self-test   -Pt requiring increased time and rest breaks throughout session d/t impaired endurance and reports of SOB and fatigue with RN notified  -PCT in room to obtain standing weight with pt able to take a step onto the scale and off with SBA and while maintaining safety   -Emergency bag checked by OT and contents included 2 batteries and additional controller.  -LVAD education provided including battery rotation, equipment, emergency bag, importance of driveline protection, and keeping controller secured to self  -Pt requesting to shave with standard razor-education provided within scope on inability to shave at this time without an electric razor as pt is on blood thinners    -Education provided on importance of maintaining sternal precautions with transfers and bed mobility     Additional:  Communicated role of OT/POC  Updated communication board  RN notified post-OT session    Assessment:     Tim Richards is a 52 y.o. male with a medical diagnosis of LVAD (left ventricular assist device) present. LVAD implanted on 3/8/2018, revision of outflow graft 3/9, chest closure on 3/10, and t/f from ICU to floor on 3/17.  Performance  deficits affecting function are weakness, impaired endurance, impaired functional mobilty, impaired self care skills, impaired balance, impaired cardiopulmonary response to activity.  Pt with good motivation to engage in therapy session and with good insight into condition. Pt with increased fatigue and impaired activity tolerance in comparison to say prior with inability to stand for more than 4 minutes without requiring a seated rest break and declining further functional mobility outside the room 2/2 feeling fatigued. Pt donning LVAD holster and stating he prefers it over the shoulder consolidation bag. Pt with good and safe management of LVAD during session. Pt continuing to have difficulty maintaining sternal precautions with mod vc's required to maintain with transfers.     Rehab Prognosis:  good; patient would benefit from acute skilled OT services to address these deficits and reach maximum level of function.       Plan:     Patient to be seen 6 x/week to address the above listed problems via self-care/home management, therapeutic activities, therapeutic exercises  · Plan of Care Expires: 04/17/18  · Plan of Care Reviewed with: patient    This Plan of care has been discussed with the patient who was involved in its development and understands and is in agreement with the identified goals and treatment plan    GOALS:    Occupational Therapy Goals        Problem: Occupational Therapy Goal    Goal Priority Disciplines Outcome Interventions   Occupational Therapy Goal     OT, PT/OT Ongoing (interventions implemented as appropriate)    Description:  Goals to be met by: 3/26/18     Patient will increase functional independence with ADLs by performing:    Feeding with [place]. MET  UE Dressing with Supervision.   LE Dressing with Supervision.   Grooming while standing at sink with Supervision. MET  Toileting from toilet with Supervision for hygiene and clothing management.   Toilet transfer to toilet with  Supervision.  Pt will be (I) with VAD management during ADL.                       Time Tracking:     OT Date of Treatment: 03/18/18  OT Start Time: 0830  OT Stop Time: 0914  OT Total Time (min): 44 min    Billable Minutes:Self Care/Home Management 44 minutes    Codi Perez, OT  3/18/2018

## 2018-03-19 ENCOUNTER — ANESTHESIA EVENT (OUTPATIENT)
Dept: CARDIOLOGY | Facility: HOSPITAL | Age: 53
End: 2018-03-19

## 2018-03-19 ENCOUNTER — ANESTHESIA (OUTPATIENT)
Dept: CARDIOLOGY | Facility: HOSPITAL | Age: 53
End: 2018-03-19

## 2018-03-19 LAB
ALBUMIN SERPL BCP-MCNC: 2.9 G/DL
ALP SERPL-CCNC: 176 U/L
ALT SERPL W/O P-5'-P-CCNC: 57 U/L
ANION GAP SERPL CALC-SCNC: 12 MMOL/L
APTT BLDCRRT: 49.2 SEC
APTT BLDCRRT: 54.6 SEC
APTT BLDCRRT: 54.6 SEC
APTT BLDCRRT: 72.3 SEC
AST SERPL-CCNC: 63 U/L
BASOPHILS # BLD AUTO: 0.03 K/UL
BASOPHILS NFR BLD: 0.2 %
BILIRUB DIRECT SERPL-MCNC: 1.7 MG/DL
BILIRUB SERPL-MCNC: 2.1 MG/DL
BNP SERPL-MCNC: 398 PG/ML
BUN SERPL-MCNC: 38 MG/DL
CALCIUM SERPL-MCNC: 10 MG/DL
CHLORIDE SERPL-SCNC: 92 MMOL/L
CO2 SERPL-SCNC: 29 MMOL/L
CREAT SERPL-MCNC: 2 MG/DL
CRP SERPL-MCNC: 132.4 MG/L
DIFFERENTIAL METHOD: ABNORMAL
EOSINOPHIL # BLD AUTO: 0.1 K/UL
EOSINOPHIL NFR BLD: 0.4 %
ERYTHROCYTE [DISTWIDTH] IN BLOOD BY AUTOMATED COUNT: 16.1 %
EST. GFR  (AFRICAN AMERICAN): 43.4 ML/MIN/1.73 M^2
EST. GFR  (NON AFRICAN AMERICAN): 37.5 ML/MIN/1.73 M^2
FACT X PPP CHRO-ACNC: 0.22 IU/ML
FACT X PPP CHRO-ACNC: 0.29 IU/ML
FACT X PPP CHRO-ACNC: 0.35 IU/ML
GLUCOSE SERPL-MCNC: 101 MG/DL
HCT VFR BLD AUTO: 25.7 %
HGB BLD-MCNC: 8.1 G/DL
IMM GRANULOCYTES # BLD AUTO: 0.24 K/UL
IMM GRANULOCYTES NFR BLD AUTO: 1.5 %
INR PPP: 1.7
LDH SERPL L TO P-CCNC: 396 U/L
LYMPHOCYTES # BLD AUTO: 1.2 K/UL
LYMPHOCYTES NFR BLD: 7.7 %
MAGNESIUM SERPL-MCNC: 1.9 MG/DL
MAGNESIUM SERPL-MCNC: 2 MG/DL
MAGNESIUM SERPL-MCNC: 2.1 MG/DL
MAGNESIUM SERPL-MCNC: 2.1 MG/DL
MCH RBC QN AUTO: 26.6 PG
MCHC RBC AUTO-ENTMCNC: 31.5 G/DL
MCV RBC AUTO: 84 FL
MONOCYTES # BLD AUTO: 1.4 K/UL
MONOCYTES NFR BLD: 9.1 %
NEUTROPHILS # BLD AUTO: 12.8 K/UL
NEUTROPHILS NFR BLD: 81.1 %
NRBC BLD-RTO: 0 /100 WBC
PHOSPHATE SERPL-MCNC: 3.7 MG/DL
PLATELET # BLD AUTO: 299 K/UL
PMV BLD AUTO: 10.8 FL
POTASSIUM SERPL-SCNC: 3.7 MMOL/L
POTASSIUM SERPL-SCNC: 3.7 MMOL/L
POTASSIUM SERPL-SCNC: 3.8 MMOL/L
POTASSIUM SERPL-SCNC: 3.8 MMOL/L
POTASSIUM SERPL-SCNC: 3.9 MMOL/L
POTASSIUM SERPL-SCNC: 3.9 MMOL/L
PREALB SERPL-MCNC: 18 MG/DL
PROT SERPL-MCNC: 7.4 G/DL
PROTHROMBIN TIME: 17 SEC
RBC # BLD AUTO: 3.05 M/UL
SODIUM SERPL-SCNC: 133 MMOL/L
WBC # BLD AUTO: 15.75 K/UL

## 2018-03-19 PROCEDURE — 83615 LACTATE (LD) (LDH) ENZYME: CPT

## 2018-03-19 PROCEDURE — 99232 SBSQ HOSP IP/OBS MODERATE 35: CPT | Mod: ,,, | Performed by: INTERNAL MEDICINE

## 2018-03-19 PROCEDURE — 63600175 PHARM REV CODE 636 W HCPCS: Performed by: THORACIC SURGERY (CARDIOTHORACIC VASCULAR SURGERY)

## 2018-03-19 PROCEDURE — 84100 ASSAY OF PHOSPHORUS: CPT

## 2018-03-19 PROCEDURE — 80076 HEPATIC FUNCTION PANEL: CPT

## 2018-03-19 PROCEDURE — 85520 HEPARIN ASSAY: CPT | Mod: 91

## 2018-03-19 PROCEDURE — A4216 STERILE WATER/SALINE, 10 ML: HCPCS | Performed by: THORACIC SURGERY (CARDIOTHORACIC VASCULAR SURGERY)

## 2018-03-19 PROCEDURE — 85025 COMPLETE CBC W/AUTO DIFF WBC: CPT

## 2018-03-19 PROCEDURE — 27000248 HC VAD-ADDITIONAL DAY

## 2018-03-19 PROCEDURE — 25000003 PHARM REV CODE 250: Performed by: THORACIC SURGERY (CARDIOTHORACIC VASCULAR SURGERY)

## 2018-03-19 PROCEDURE — 25000003 PHARM REV CODE 250: Performed by: STUDENT IN AN ORGANIZED HEALTH CARE EDUCATION/TRAINING PROGRAM

## 2018-03-19 PROCEDURE — 94761 N-INVAS EAR/PLS OXIMETRY MLT: CPT

## 2018-03-19 PROCEDURE — 83880 ASSAY OF NATRIURETIC PEPTIDE: CPT

## 2018-03-19 PROCEDURE — 80048 BASIC METABOLIC PNL TOTAL CA: CPT

## 2018-03-19 PROCEDURE — 63600175 PHARM REV CODE 636 W HCPCS: Performed by: NURSE PRACTITIONER

## 2018-03-19 PROCEDURE — 83735 ASSAY OF MAGNESIUM: CPT | Mod: 91

## 2018-03-19 PROCEDURE — 84132 ASSAY OF SERUM POTASSIUM: CPT | Mod: 91

## 2018-03-19 PROCEDURE — 85730 THROMBOPLASTIN TIME PARTIAL: CPT

## 2018-03-19 PROCEDURE — 86140 C-REACTIVE PROTEIN: CPT

## 2018-03-19 PROCEDURE — 85730 THROMBOPLASTIN TIME PARTIAL: CPT | Mod: 91

## 2018-03-19 PROCEDURE — 84134 ASSAY OF PREALBUMIN: CPT

## 2018-03-19 PROCEDURE — 85610 PROTHROMBIN TIME: CPT

## 2018-03-19 PROCEDURE — 93750 INTERROGATION VAD IN PERSON: CPT | Mod: ,,, | Performed by: INTERNAL MEDICINE

## 2018-03-19 PROCEDURE — 20600001 HC STEP DOWN PRIVATE ROOM

## 2018-03-19 RX ADMIN — Medication 10 ML: at 12:03

## 2018-03-19 RX ADMIN — WARFARIN SODIUM 7.5 MG: 2.5 TABLET ORAL at 04:03

## 2018-03-19 RX ADMIN — FERROUS GLUCONATE TAB 324 MG (37.5 MG ELEMENTAL IRON) 324 MG: 324 (37.5 FE) TAB at 09:03

## 2018-03-19 RX ADMIN — HEPARIN SODIUM 2000 UNITS/HR: 10000 INJECTION, SOLUTION INTRAVENOUS at 06:03

## 2018-03-19 RX ADMIN — POTASSIUM CHLORIDE 40 MEQ: 1500 TABLET, EXTENDED RELEASE ORAL at 08:03

## 2018-03-19 RX ADMIN — AMIODARONE HYDROCHLORIDE 200 MG: 200 TABLET ORAL at 09:03

## 2018-03-19 RX ADMIN — HEPARIN SODIUM 1700 UNITS/HR: 10000 INJECTION, SOLUTION INTRAVENOUS at 08:03

## 2018-03-19 RX ADMIN — DOBUTAMINE IN DEXTROSE 4 MCG/KG/MIN: 200 INJECTION, SOLUTION INTRAVENOUS at 06:03

## 2018-03-19 RX ADMIN — FUROSEMIDE: 10 INJECTION, SOLUTION INTRAVENOUS at 05:03

## 2018-03-19 RX ADMIN — POTASSIUM CHLORIDE 40 MEQ: 1500 TABLET, EXTENDED RELEASE ORAL at 09:03

## 2018-03-19 RX ADMIN — FUROSEMIDE 10 MG/HR: 10 INJECTION, SOLUTION INTRAVENOUS at 12:03

## 2018-03-19 RX ADMIN — Medication 81 MG: at 09:03

## 2018-03-19 RX ADMIN — PANTOPRAZOLE SODIUM 40 MG: 40 TABLET, DELAYED RELEASE ORAL at 09:03

## 2018-03-19 RX ADMIN — Medication 10 ML: at 06:03

## 2018-03-19 NOTE — ASSESSMENT & PLAN NOTE
-creatinine has increased slightly to above baseline   -Baseline creat 1.6-2.0.  -appears euvolemic , will monitor creatinine trends off Lasix

## 2018-03-19 NOTE — PROGRESS NOTES
[facility]  Heart Transplant  Progress Note    Patient Name: Tim Richards  MRN: [id number]  Admission Date: 3/1/2018  Hospital Length of Stay: 18 days  Attending Physician: Yg Kaufman MD  Primary Care Provider: Ja Méndez MD  Principal Problem:LVAD (left ventricular assist device) present    Subjective:     Interval History: Patient report working on [employer] and journal. He does not like the food on his dietary tray and reports he is not really eating well.  Otherwise; states weakness has improved    Continuous Infusions:   DOBUTamine 4 mcg/kg/min (03/19/18 0619)    furosemide (LASIX) 10 mg/mL infusion (non-titrating)      heparin (porcine) in 5 % dex 1,900 Units/hr (03/19/18 0922)     Scheduled Meds:   amiodarone  200 mg Oral Daily    docusate sodium  200 mg Oral QHS    ferrous gluconate  324 mg Oral Daily with breakfast    INV aspirin/placebo  81 mg Oral Daily    pantoprazole  40 mg Oral Daily    polyethylene glycol  17 g Oral Daily    potassium chloride SA  40 mEq Oral BID    sodium chloride 0.9%  10 mL Intravenous Q6H    sodium chloride 0.9%  3 mL Intravenous Q8H    warfarin  7.5 mg Oral Daily     PRN Meds:acetaminophen, albuterol sulfate, bisacodyl, dextrose 50%, dextrose 50%, glucagon (human recombinant), glucose, glucose, insulin aspart U-100, magnesium hydroxide 400 mg/5 ml, oxyCODONE, oxyCODONE, Flushing PICC Protocol **AND** sodium chloride 0.9% **AND** sodium chloride 0.9%    Review of patient's allergies indicates:   Allergen Reactions    Lisinopril Anaphylaxis     Objective:     Vital Signs (Most Recent):  Temp: 98.3 °F (36.8 °C) (03/19/18 0400)  Pulse: 79 (03/19/18 0700)  Resp: 14 (03/19/18 0400)  BP: (!) 80/0 (03/19/18 0405)  SpO2: 96 % (03/19/18 0400) Vital Signs (24h Range):  Temp:  [98.3 °F (36.8 °C)-99.1 °F (37.3 °C)] 98.3 °F (36.8 °C)  Pulse:  [69-87] 79  Resp:  [14-18] 14  SpO2:  [93 %-97 %] 96 %  BP: ()/(0-67) 80/0     Patient Vitals  for the past 72 hrs (Last 3 readings):   Weight   03/19/18 0700 108.8 kg (239 lb 13.8 oz)   03/18/18 0700 109.6 kg (241 lb 10 oz)   03/17/18 0700 110.3 kg (243 lb 2.7 oz)     Body mass index is 31.65 kg/m².      Intake/Output Summary (Last 24 hours) at 03/19/18 1055  Last data filed at 03/19/18 0700   Gross per 24 hour   Intake              450 ml   Output             2725 ml   Net            -2275 ml     Hemodynamic Parameters:    Physical Exam   Constitutional: He appears well-developed and well-nourished. No distress.   HENT:   Head: Normocephalic and atraumatic.   Neck: Normal range of motion. No JVD present.   Cardiovascular: Normal rate.  Exam reveals no friction rub.    No murmur heard.  Normal VAD hum   Pulmonary/Chest: Effort normal. No respiratory distress. He has no wheezes. He has rales.   Faint left lower lobe   Abdominal: Soft. He exhibits no distension.   Musculoskeletal: Normal range of motion. He exhibits no edema.   Neurological: He is alert.   Skin: Skin is warm. Capillary refill takes 2 to 3 seconds. He is not diaphoretic. No erythema.     Significant Labs:  CBC:    Recent Labs  Lab 03/17/18  0432 03/18/18  0326 03/19/18  0634   WBC 16.88* 17.76* 15.75*   RBC 2.76* 2.94* 3.05*   HGB 7.5* 7.9* 8.1*   HCT 23.4* 25.1* 25.7*    291 299   MCV 85 85 84   MCH 27.2 26.9* 26.6*   MCHC 32.1 31.5* 31.5*     BNP:    Recent Labs  Lab 03/14/18  0440 03/16/18  0455 03/19/18  0633   BNP 1,324* 531* 398*     CMP:    Recent Labs  Lab 03/15/18  0852  03/16/18  0455  03/17/18  0432  03/18/18  0326  03/18/18  1836 03/18/18  2333 03/19/18  0634   *  --  135*  --  120*  --  105  --   --   --  101   CALCIUM 10.2  --  10.1  --  9.9  --  9.6  --   --   --  10.0   ALBUMIN 2.9*  --  2.7*  --   --   --   --   --   --   --  2.9*   PROT 7.3  --  6.9  --   --   --   --   --   --   --  7.4   *  --  134*  --  133*  --  133*  --   --   --  133*   K 3.8  < > 3.8  3.8  < > 3.5  3.5  3.5  < > 3.7  3.7  < > 3.8  3.8  3.8 3.7  3.7   CO2 32*  --  32*  --  31*  --  31*  --   --   --  29   CL 91*  --  90*  --  89*  --  91*  --   --   --  92*   BUN 37*  --  36*  --  40*  --  40*  --   --   --  38*   CREATININE 1.9*  --  1.8*  --  1.9*  --  2.1*  --   --   --  2.0*   ALKPHOS 174*  --  167*  --   --   --   --   --   --   --  176*   ALT 38  --  40  --   --   --   --   --   --   --  57*   AST 58*  --  54*  --   --   --   --   --   --   --  63*   BILITOT 3.8*  --  2.9*  --   --   --   --   --   --   --  2.1*   < > = values in this interval not displayed.   Coagulation:     Recent Labs  Lab 03/17/18  0432  03/18/18  0326  03/18/18  1836 03/18/18  2333 03/19/18  0634   INR 1.4*  --  1.6*  --   --   --  1.7*   APTT 50.0*  50.0*  < > 51.7*  51.7*  < > 50.3* 49.2* 54.6*  54.6*   < > = values in this interval not displayed.  LDH:    Recent Labs  Lab 03/17/18  0432 03/18/18  0326 03/19/18  0634   * 443* 396*     Microbiology:  Microbiology Results (last 7 days)     Procedure Component Value Units Date/Time    Blood culture [[id number]] Collected:  03/15/18 1358    Order Status:  Completed Specimen:  Blood from Peripheral, Hand, Left Updated:  03/18/18 1812     Blood Culture, Routine No Growth to date     Blood Culture, Routine No Growth to date     Blood Culture, Routine No Growth to date     Blood Culture, Routine No Growth to date    Blood culture [[id number]] Collected:  03/15/18 1407    Order Status:  Completed Specimen:  Blood from Peripheral, Antecubital, Left Updated:  03/18/18 1812     Blood Culture, Routine No Growth to date     Blood Culture, Routine No Growth to date     Blood Culture, Routine No Growth to date     Blood Culture, Routine No Growth to date    Culture, Respiratory with Gram Stain [[id number]] Collected:  03/11/18 0800    Order Status:  Completed Specimen:  Respiratory from Endotracheal Aspirate Updated:  03/17/18 0931     Respiratory Culture No growth     Gram Stain (Respiratory) <10 epithelial cells per  low power field.     Gram Stain (Respiratory) Few WBC's     Gram Stain (Respiratory) Rare Gram positive cocci     Gram Stain (Respiratory) Rare Gram positive rods    Urine culture [[id number]] Collected:  03/15/18 1431    Order Status:  Completed Specimen:  Urine from Urine, Clean Catch Updated:  03/16/18 2056     Urine Culture, Routine No growth    Blood culture [[id number]] Collected:  03/11/18 0650    Order Status:  Completed Specimen:  Blood from Peripheral, Hand, Right Updated:  03/16/18 1012     Blood Culture, Routine No growth after 5 days.    Blood culture [[id number]] Collected:  03/11/18 0651    Order Status:  Completed Specimen:  Blood from Peripheral, Wrist, Right Updated:  03/16/18 1012     Blood Culture, Routine No growth after 5 days.          I have reviewed all pertinent labs within the past 24 hours.    Estimated Creatinine Clearance: 56.6 mL/min (A) (based on SCr of 2 mg/dL (H)).    Diagnostic Results:  I have reviewed and interpreted all pertinent imaging results/findings within the past 24 hours.    Assessment and Plan:         * LVAD (left ventricular assist device) present    -HeartMate II Implanted 3/8/18 as DT with repositioning of outflow graft 3/9/18.  S/p chest closure 3/10/18  -CTS Primary  -Implanted by Dr. Kaufman  -Anticoagulation per Primary Coumadin  -Antiplatelets; Patient is enrolled in PREVENT II  -Speed set at 9000, LSL 8600 rpm  -Not listed for OHTx  - On ; Now off Marilia and EPi  - Lasix now off: CVP was 6 this morning          Acute on chronic combined systolic and diastolic heart failure    -NICM   -Echo prior to VAD- EF: <10%; LVEDD: 9.7 cm  -now s/p HM II LVAD- most recent Echo 3/14/18- LVEDD 8.5             CKD (chronic kidney disease), stage III    -creatinine has increased slightly to above baseline   -Baseline creat 1.6-2.0.  -appears euvolemic , will monitor creatinine trends off Lasix        PVT (paroxysmal ventricular tachycardia)    -has ICD  -Transitioned from IV  Amiodarone infusion to po         Hyperbilirubinemia    -likely secondary to above, trending down  -RUQ ultrasound neg        Biventricular implantable cardioverter-defibrillator in situ    - in place  - ICD interrogated, event noted on 2/25/18 with appropriate shock for VT  -Continue Amiodarone po        Anemia    -Transfused a unit PRBC's 3/12  -H/H stable        Pneumothorax    -IR unable to place chest tube or pig tail; monitor daily CXR            YANET Coleman  Heart Transplant  [facility]

## 2018-03-19 NOTE — ASSESSMENT & PLAN NOTE
-NICM   -Echo prior to VAD- EF: <10%; LVEDD: 9.7 cm  -now s/p HM II LVAD- most recent Echo 3/14/18- LVEDD 8.5

## 2018-03-19 NOTE — ASSESSMENT & PLAN NOTE
- Sternal precautions   - PT/OT  - Ambulate x4   - Oxycodone PRN pain  - Cardiac diet  - Encourage IS, deep breathing, cough  - Daily chest xray  - Dobutamine at 4, po amio, no ASA (Prevent II trial)  - Heparin gtt, PTT goal 40-50; stop when INR is therapeutic  - Keep Coumadin dose at 7.5mg tonight 7.5 yesterday, INR 1.7 today, 1.6 today   - Lasix gtt decreased to 10mg/hr   - pt continues VAD education daily   - plan to transition to hospitals soon for medical management for anticipated DC

## 2018-03-19 NOTE — SUBJECTIVE & OBJECTIVE
Interval History: Patient report working on [employer] and journal. He does not like the food on his dietary tray and reports he is not really eating well.  Otherwise; states weakness has improved    Continuous Infusions:   DOBUTamine 4 mcg/kg/min (03/19/18 0619)    furosemide (LASIX) 10 mg/mL infusion (non-titrating)      heparin (porcine) in 5 % dex 1,900 Units/hr (03/19/18 0922)     Scheduled Meds:   amiodarone  200 mg Oral Daily    docusate sodium  200 mg Oral QHS    ferrous gluconate  324 mg Oral Daily with breakfast    INV aspirin/placebo  81 mg Oral Daily    pantoprazole  40 mg Oral Daily    polyethylene glycol  17 g Oral Daily    potassium chloride SA  40 mEq Oral BID    sodium chloride 0.9%  10 mL Intravenous Q6H    sodium chloride 0.9%  3 mL Intravenous Q8H    warfarin  7.5 mg Oral Daily     PRN Meds:acetaminophen, albuterol sulfate, bisacodyl, dextrose 50%, dextrose 50%, glucagon (human recombinant), glucose, glucose, insulin aspart U-100, magnesium hydroxide 400 mg/5 ml, oxyCODONE, oxyCODONE, Flushing PICC Protocol **AND** sodium chloride 0.9% **AND** sodium chloride 0.9%    Review of patient's allergies indicates:   Allergen Reactions    Lisinopril Anaphylaxis     Objective:     Vital Signs (Most Recent):  Temp: 98.3 °F (36.8 °C) (03/19/18 0400)  Pulse: 79 (03/19/18 0700)  Resp: 14 (03/19/18 0400)  BP: (!) 80/0 (03/19/18 0405)  SpO2: 96 % (03/19/18 0400) Vital Signs (24h Range):  Temp:  [98.3 °F (36.8 °C)-99.1 °F (37.3 °C)] 98.3 °F (36.8 °C)  Pulse:  [69-87] 79  Resp:  [14-18] 14  SpO2:  [93 %-97 %] 96 %  BP: ()/(0-67) 80/0     Patient Vitals for the past 72 hrs (Last 3 readings):   Weight   03/19/18 0700 108.8 kg (239 lb 13.8 oz)   03/18/18 0700 109.6 kg (241 lb 10 oz)   03/17/18 0700 110.3 kg (243 lb 2.7 oz)     Body mass index is 31.65 kg/m².      Intake/Output Summary (Last 24 hours) at 03/19/18 1055  Last data filed at 03/19/18 0700   Gross per 24 hour   Intake              450  ml   Output             2725 ml   Net            -2275 ml     Hemodynamic Parameters:    Physical Exam   Constitutional: He appears well-developed and well-nourished. No distress.   HENT:   Head: Normocephalic and atraumatic.   Neck: Normal range of motion. No JVD present.   Cardiovascular: Normal rate.  Exam reveals no friction rub.    No murmur heard.  Normal VAD hum   Pulmonary/Chest: Effort normal. No respiratory distress. He has no wheezes. He has rales.   Faint left lower lobe   Abdominal: Soft. He exhibits no distension.   Musculoskeletal: Normal range of motion. He exhibits no edema.   Neurological: He is alert.   Skin: Skin is warm. Capillary refill takes 2 to 3 seconds. He is not diaphoretic. No erythema.     Significant Labs:  CBC:    Recent Labs  Lab 03/17/18  0432 03/18/18  0326 03/19/18  0634   WBC 16.88* 17.76* 15.75*   RBC 2.76* 2.94* 3.05*   HGB 7.5* 7.9* 8.1*   HCT 23.4* 25.1* 25.7*    291 299   MCV 85 85 84   MCH 27.2 26.9* 26.6*   MCHC 32.1 31.5* 31.5*     BNP:    Recent Labs  Lab 03/14/18  0440 03/16/18  0455 03/19/18  0633   BNP 1,324* 531* 398*     CMP:    Recent Labs  Lab 03/15/18  0852  03/16/18  0455  03/17/18  0432  03/18/18  0326  03/18/18  1836 03/18/18  2333 03/19/18  0634   *  --  135*  --  120*  --  105  --   --   --  101   CALCIUM 10.2  --  10.1  --  9.9  --  9.6  --   --   --  10.0   ALBUMIN 2.9*  --  2.7*  --   --   --   --   --   --   --  2.9*   PROT 7.3  --  6.9  --   --   --   --   --   --   --  7.4   *  --  134*  --  133*  --  133*  --   --   --  133*   K 3.8  < > 3.8  3.8  < > 3.5  3.5  3.5  < > 3.7  3.7  < > 3.8 3.8  3.8 3.7  3.7   CO2 32*  --  32*  --  31*  --  31*  --   --   --  29   CL 91*  --  90*  --  89*  --  91*  --   --   --  92*   BUN 37*  --  36*  --  40*  --  40*  --   --   --  38*   CREATININE 1.9*  --  1.8*  --  1.9*  --  2.1*  --   --   --  2.0*   ALKPHOS 174*  --  167*  --   --   --   --   --   --   --  176*   ALT 38  --  40  --   --    --   --   --   --   --  57*   AST 58*  --  54*  --   --   --   --   --   --   --  63*   BILITOT 3.8*  --  2.9*  --   --   --   --   --   --   --  2.1*   < > = values in this interval not displayed.   Coagulation:     Recent Labs  Lab 03/17/18  0432  03/18/18  0326  03/18/18  1836 03/18/18  2333 03/19/18  0634   INR 1.4*  --  1.6*  --   --   --  1.7*   APTT 50.0*  50.0*  < > 51.7*  51.7*  < > 50.3* 49.2* 54.6*  54.6*   < > = values in this interval not displayed.  LDH:    Recent Labs  Lab 03/17/18  0432 03/18/18  0326 03/19/18  0634   * 443* 396*     Microbiology:  Microbiology Results (last 7 days)     Procedure Component Value Units Date/Time    Blood culture [[id number]] Collected:  03/15/18 1358    Order Status:  Completed Specimen:  Blood from Peripheral, Hand, Left Updated:  03/18/18 1812     Blood Culture, Routine No Growth to date     Blood Culture, Routine No Growth to date     Blood Culture, Routine No Growth to date     Blood Culture, Routine No Growth to date    Blood culture [[id number]] Collected:  03/15/18 1407    Order Status:  Completed Specimen:  Blood from Peripheral, Antecubital, Left Updated:  03/18/18 1812     Blood Culture, Routine No Growth to date     Blood Culture, Routine No Growth to date     Blood Culture, Routine No Growth to date     Blood Culture, Routine No Growth to date    Culture, Respiratory with Gram Stain [[id number]] Collected:  03/11/18 0800    Order Status:  Completed Specimen:  Respiratory from Endotracheal Aspirate Updated:  03/17/18 0931     Respiratory Culture No growth     Gram Stain (Respiratory) <10 epithelial cells per low power field.     Gram Stain (Respiratory) Few WBC's     Gram Stain (Respiratory) Rare Gram positive cocci     Gram Stain (Respiratory) Rare Gram positive rods    Urine culture [[id number]] Collected:  03/15/18 1431    Order Status:  Completed Specimen:  Urine from Urine, Clean Catch Updated:  03/16/18 2056     Urine Culture, Routine No growth     Blood culture [[id number]] Collected:  03/11/18 0650    Order Status:  Completed Specimen:  Blood from Peripheral, Hand, Right Updated:  03/16/18 1012     Blood Culture, Routine No growth after 5 days.    Blood culture [[id number]] Collected:  03/11/18 0651    Order Status:  Completed Specimen:  Blood from Peripheral, Wrist, Right Updated:  03/16/18 1012     Blood Culture, Routine No growth after 5 days.          I have reviewed all pertinent labs within the past 24 hours.    Estimated Creatinine Clearance: 56.6 mL/min (A) (based on SCr of 2 mg/dL (H)).    Diagnostic Results:  I have reviewed and interpreted all pertinent imaging results/findings within the past 24 hours.

## 2018-03-19 NOTE — PLAN OF CARE
Problem: Patient Care Overview  Goal: Individualization & Mutuality  PMHx:CHF, DCM, CKD, gout, HTN, V. Tach, AICD, colonoscopy   1/12-1/17: txtmt for ADHF. RHC      3/1: Admitted for LVAD placement   3/8: LVAD placement  3/9: washout chest closure  3/12: 1 u PRBC  314: abdominal ultrasound, echo, oob to chair    APTT goal 40-50        Plan of care discussed with patient.  Patient ambulating with stand-by assistance, fall precautions in place.Continuing to encourage sternal precautions, and ambulation. LVAD DP and numbers WNL, smooth LVAD hum. Patient has no complaints of pain. Discussed medications and care. Encouraged workbook, reviewed education, filled in binder.VSS. AAOx4. Heparin, lasix, and dobutamine gtts continued. Patient has no questions at this time. Will continue to monitor.

## 2018-03-19 NOTE — PROGRESS NOTES
Notified NATANAEL Teran of pt's PTT=72.3, Goal 40-50. NP ordered to go down to 1700 U/hr on heparin gtt. Will continue to monitor pt.

## 2018-03-19 NOTE — SUBJECTIVE & OBJECTIVE
Subjective:     Post-Op Info:  Procedure(s) (LRB):  CLOSURE-STERNAL WOUND (N/A)  PLACEMENT-NEOPERICARDIUM   9 Days Post-Op      Reason for Visit:  Patient is seen in follow up management of HMII     Interval History: No acute events overnight. Paced in the 80-90s on monitor.    Implant 3/8/18 closed 3/10/18    Medications:  Continuous Infusions:   DOBUTamine 4 mcg/kg/min (03/19/18 0619)    furosemide (LASIX) 10 mg/mL infusion (non-titrating) 10 mg/hr (03/19/18 1220)    heparin (porcine) in 5 % dex 1,900 Units/hr (03/19/18 0922)     Scheduled Meds:   amiodarone  200 mg Oral Daily    docusate sodium  200 mg Oral QHS    ferrous gluconate  324 mg Oral Daily with breakfast    INV aspirin/placebo  81 mg Oral Daily    pantoprazole  40 mg Oral Daily    polyethylene glycol  17 g Oral Daily    potassium chloride SA  40 mEq Oral BID    sodium chloride 0.9%  10 mL Intravenous Q6H    sodium chloride 0.9%  3 mL Intravenous Q8H    warfarin  7.5 mg Oral Daily     PRN Meds:acetaminophen, albuterol sulfate, bisacodyl, dextrose 50%, dextrose 50%, glucagon (human recombinant), glucose, glucose, insulin aspart U-100, magnesium hydroxide 400 mg/5 ml, oxyCODONE, oxyCODONE, Flushing PICC Protocol **AND** sodium chloride 0.9% **AND** sodium chloride 0.9%     Objective:     Vital Signs (Most Recent):  Temp: 98.5 °F (36.9 °C) (03/19/18 1215)  Pulse: 82 (03/19/18 1215)  Resp: 16 (03/19/18 1215)  BP: (!) 80/0 (03/19/18 1215)  SpO2: 97 % (03/19/18 1215) Vital Signs (24h Range):  Temp:  [98.3 °F (36.8 °C)-99.1 °F (37.3 °C)] 98.5 °F (36.9 °C)  Pulse:  [69-89] 82  Resp:  [14-18] 16  SpO2:  [95 %-97 %] 97 %  BP: ()/(0-67) 80/0       Intake/Output Summary (Last 24 hours) at 03/19/18 1251  Last data filed at 03/19/18 0700   Gross per 24 hour   Intake              450 ml   Output             2725 ml   Net            -2275 ml       Physical Exam    Significant Labs:  CBC:   Recent Labs  Lab 03/19/18  0634   WBC 15.75*   RBC 3.05*    HGB 8.1*   HCT 25.7*      MCV 84   MCH 26.6*   MCHC 31.5*     CMP:   Recent Labs  Lab 03/19/18  0634      CALCIUM 10.0   ALBUMIN 2.9*   PROT 7.4   *   K 3.7  3.7   CO2 29   CL 92*   BUN 38*   CREATININE 2.0*   ALKPHOS 176*   ALT 57*   AST 63*   BILITOT 2.1*     Coagulation:   Recent Labs  Lab 03/19/18  0634   INR 1.7*   APTT 54.6*  54.6*       Significant Diagnostics:  CXR: I have reviewed all pertinent results/findings within the past 24 hours    Procedure: Device Interrogation Including analysis of device parameters  Current Settings: Ventricular Assist Device  Review of device function is stable  TXP LVAD INTERROGATIONS 3/19/2018 3/19/2018 3/19/2018 3/18/2018 3/18/2018 3/18/2018 3/18/2018   Type HeartMate II HeartMate II HeartMate II HeartMate II HeartMate II HeartMate II HeartMate II   Flow 6.4 5.6 6.2 5.8 5.9 6.7 6.1   Speed 9000 9000 9000 9000 9000 9000 9000   PI 5.5 5.6 5.8 5.4 5.3 5.4 5.4   Power ([place]) 5.7 5.6 5.7 5.7 5.6 5.9 5.7   [facility] - 8600 8600 8600 8600 - -   Pulsatility - Intermittent pulse Intermittent pulse Pulse No Pulse - -

## 2018-03-19 NOTE — PROGRESS NOTES
Spent 60 mins educating patient. Educational HM 2 DVD provided by company has been reviewed by patient.  VAD binder reviewed with patient including what to document and the meanings of all the parameters. Additionally, pointed out to patient where to find the proper phone numbers to call each individual VAD coordinator. We spoke about when to page the coordinator vs when to call during normal business hours. Possible medication the patient may encounter with a VAD were discussed including not changing prescriptions without talking to their VAD team. Explained that the pharmacist will further discuss before discharge and create them a purple card. Patient was not able to successfully page the VAD coordinator and point out the proper places to find the Coordinator on calls phone number/emergency number. Patient was able to explain what the VAD is and how it works. Patient was able to successfully explained that if putnam are greater then 10 they will page the VAD coordinator. Proper severe weather plans were discussed including proper generator use and his evacuation plan. Patient explains the  is not to use the module power unit if it is plugged into a generator. Patient was able to successfully change controller and verbalize to page VAD coordinator prior to initiating controller change. Patient understands the locking mechanism for the driveline, sleep mode, and how to jump start the controller as needed. Patient was a successfully explained that items he will have at his bedside at all times including flashlight, batteries, and clips. Patient was able to verbalize understanding that he must sleep on the module power unit. Patient was able to verbalize the alarms and all their meanings for the power module. Patient verbalized backup battery length in the power module. Patient explained that they will not leave the power module unplugged for greater then 18 hours. We discussed the battery charger. Patient was able  to explain that battery lifespan, charge length, and how to rotate batteries. Patient was able to explain how to calibrate batteries and when the proper time to do so.Patient was able to explain how to tell when the pump is running and what to do if it is not. Patient was not able to verbalize  alarms hazard vs advisory alarms.    Patient is not checked off on alarms per VAD coordinator but feel he will be able to be checked off Tomorrow.

## 2018-03-19 NOTE — PROGRESS NOTES
Received call about PA and Lat CXR ordered this AM because portable CXR had already been completed. Asked NATANAEL Teran. NP stated they didn't need the PA and Lat CXR.  Also, asked NP if accuchecks were still needed since pt hasn't needed any insulin coverage in many days. NP ordered to D/C order.   Will continue to monitor pt.

## 2018-03-19 NOTE — PLAN OF CARE
Problem: Patient Care Overview  Goal: Individualization & Mutuality  PMHx:CHF, DCM, CKD, gout, HTN, V. Tach, AICD, colonoscopy   1/12-1/17: txtmt for ADHF. RHC      3/1: Admitted for LVAD placement   3/8: LVAD placement  3/9: washout chest closure  3/12: 1 u PRBC  314: abdominal ultrasound, echo, oob to chair    APTT goal 40-50        Outcome: Ongoing (interventions implemented as appropriate)  Pt aPTT 49.2 Heprin 2000 units/hr, Lasix 15mL/hr, Dobutamine 4mcg/kg,hr. Pt remains free of falls and injury.

## 2018-03-19 NOTE — PROGRESS NOTES
[facility]  Cardiothoracic Surgery  Progress Note    Patient Name: Tim Richards  MRN: [id number]  Admission Date: 3/1/2018  Hospital Length of Stay: 18 days  Code Status: Full Code   Attending Physician: Yg Kaufman MD   Referring Provider: Amy Rhodes MD  Principal Problem:LVAD (left ventricular assist device) present    Subjective:     Post-Op Info:  Procedure(s) (LRB):  CLOSURE-STERNAL WOUND (N/A)  PLACEMENT-NEOPERICARDIUM   9 Days Post-Op     Subjective:     Post-Op Info:  Procedure(s) (LRB):  CLOSURE-STERNAL WOUND (N/A)  PLACEMENT-NEOPERICARDIUM   9 Days Post-Op      Reason for Visit:  Patient is seen in follow up management of II     Interval History: No acute events overnight. Paced in the 80-90s on monitor.    Implant 3/8/18 closed 3/10/18    Medications:  Continuous Infusions:   DOBUTamine 4 mcg/kg/min (03/19/18 0619)    furosemide (LASIX) 10 mg/mL infusion (non-titrating) 10 mg/hr (03/19/18 1220)    heparin (porcine) in 5 % dex 1,900 Units/hr (03/19/18 0922)     Scheduled Meds:   amiodarone  200 mg Oral Daily    docusate sodium  200 mg Oral QHS    ferrous gluconate  324 mg Oral Daily with breakfast    INV aspirin/placebo  81 mg Oral Daily    pantoprazole  40 mg Oral Daily    polyethylene glycol  17 g Oral Daily    potassium chloride SA  40 mEq Oral BID    sodium chloride 0.9%  10 mL Intravenous Q6H    sodium chloride 0.9%  3 mL Intravenous Q8H    warfarin  7.5 mg Oral Daily     PRN Meds:acetaminophen, albuterol sulfate, bisacodyl, dextrose 50%, dextrose 50%, glucagon (human recombinant), glucose, glucose, insulin aspart U-100, magnesium hydroxide 400 mg/5 ml, oxyCODONE, oxyCODONE, Flushing PICC Protocol **AND** sodium chloride 0.9% **AND** sodium chloride 0.9%     Objective:     Vital Signs (Most Recent):  Temp: 98.5 °F (36.9 °C) (03/19/18 1215)  Pulse: 82 (03/19/18 1215)  Resp: 16 (03/19/18 1215)  BP: (!) 80/0 (03/19/18 1215)  SpO2: 97 % (03/19/18 1215) Vital  Signs (24h Range):  Temp:  [98.3 °F (36.8 °C)-99.1 °F (37.3 °C)] 98.5 °F (36.9 °C)  Pulse:  [69-89] 82  Resp:  [14-18] 16  SpO2:  [95 %-97 %] 97 %  BP: ()/(0-67) 80/0       Intake/Output Summary (Last 24 hours) at 03/19/18 1251  Last data filed at 03/19/18 0700   Gross per 24 hour   Intake              450 ml   Output             2725 ml   Net            -2275 ml       Physical Exam    Significant Labs:  CBC:   Recent Labs  Lab 03/19/18  0634   WBC 15.75*   RBC 3.05*   HGB 8.1*   HCT 25.7*      MCV 84   MCH 26.6*   MCHC 31.5*     CMP:   Recent Labs  Lab 03/19/18  0634      CALCIUM 10.0   ALBUMIN 2.9*   PROT 7.4   *   K 3.7  3.7   CO2 29   CL 92*   BUN 38*   CREATININE 2.0*   ALKPHOS 176*   ALT 57*   AST 63*   BILITOT 2.1*     Coagulation:   Recent Labs  Lab 03/19/18  0634   INR 1.7*   APTT 54.6*  54.6*       Significant Diagnostics:  CXR: I have reviewed all pertinent results/findings within the past 24 hours    Procedure: Device Interrogation Including analysis of device parameters  Current Settings: Ventricular Assist Device  Review of device function is stable  TXP LVAD INTERROGATIONS 3/19/2018 3/19/2018 3/19/2018 3/18/2018 3/18/2018 3/18/2018 3/18/2018   Type HeartMate II HeartMate II HeartMate II HeartMate II HeartMate II HeartMate II HeartMate II   Flow 6.4 5.6 6.2 5.8 5.9 6.7 6.1   Speed 9000 9000 9000 9000 9000 9000 9000   PI 5.5 5.6 5.8 5.4 5.3 5.4 5.4   Power ([place]) 5.7 5.6 5.7 5.7 5.6 5.9 5.7   LSL - 8600 8600 8600 8600 - -   Pulsatility - Intermittent pulse Intermittent pulse Pulse No Pulse - -     Assessment/Plan:     * LVAD (left ventricular assist device) present    - Sternal precautions   - PT/OT  - Ambulate x4   - Oxycodone PRN pain  - Cardiac diet  - Encourage IS, deep breathing, cough  - Daily chest xray  - Dobutamine at 4, po amio, no ASA (Prevent II trial)  - Heparin gtt, PTT goal 40-50; stop when INR is therapeutic  - Keep Coumadin dose at 7.5mg tonight 7.5  yesterday, INR 1.7 today, 1.6 today   - Lasix gtt decreased to 10mg/hr   - pt continues VAD education daily   - plan to transition to [facility] soon for medical management for anticipated DC         CKD (chronic kidney disease), stage III    --Cr is 2.0 today, 2.1 yesterday         Acute decompensated heart failure    S/p LVAD         Biventricular implantable cardioverter-defibrillator in situ    --rhythm paced at 80-90s             Cassy Teran NP  Cardiothoracic Surgery  [facility]

## 2018-03-19 NOTE — PT/OT/SLP PROGRESS
"Physical Therapy      Patient Name:  Tim Richards   MRN:  [id number]    Patient not seen today secondary to unwilling to participate. PT entered room at 3:00PM which was the time pt had discussed w/ OT that he would work w/ therapy. At 3:00PM pt declined PT due to fatigue stating "not today, I need to rest." Pt educated on the importance of daily ambulation trials in order to improve strength and endurance. He verb understanding but continued to decline  . Will follow-up per PT POC.    Corrine Miller, PT   3/19/2018      "

## 2018-03-19 NOTE — PROCEDURES
Patient laying in bed NAD. VAD interrogation completed this AM in the event changes needed to be made. Will continue to monitor for further issues.     Pulsatile: Yes, intermittent   VAD Sounds: Smooth  Problems / Issues / Alarms with VAD if any: None noted      VAD Interrogation:  TXP LVAD INTERROGATIONS 3/19/2018 3/18/2018 3/18/2018 3/18/2018 3/18/2018 3/18/2018 3/18/2018   Type HeartMate II HeartMate II HeartMate II HeartMate II HeartMate II HeartMate II HeartMate II   Flow 6.2 5.8 5.9 6.7 6.1 6.5 6.1   Speed 9000 9000 9000 9000 9000 9000 9000   PI 5.8 5.4 5.3 5.4 5.4 5.4 5.6   Power ([place]) 5.7 5.7 5.6 5.9 5.7 5.9 5.8   LSL 8600 8600 8600 - - - 8600   Pulsatility Intermittent pulse Pulse No Pulse - - - No Pulse   }

## 2018-03-19 NOTE — PT/OT/SLP PROGRESS
Occupational Therapy      Patient Name:  Tim Richards   MRN:  [id number]    Patient not seen today secondary to Other (in AM attempt, pt with LVAD coordinator for education; in PM, pt requested OT/PT return at 3pm; unable to return for third attempt but PT planned to see pt). Will follow-up tomorrow.    CLIFF Salgado  3/19/2018

## 2018-03-19 NOTE — PLAN OF CARE
Problem: Patient Care Overview  Goal: Plan of Care Review  Outcome: Ongoing (interventions implemented as appropriate)  Patient remains free of falls or injury. Patient denies pain. MS incision with aquacel dressing CDI. Continued on heparin, lasix, and dobutamine drips. Wife checked off on LVAD DLES dressing change per day shift RN. Patient and wife to be checked off on alarms. Patient working with PT/OT. CVPs daily. Plan of care reviewed with patient.

## 2018-03-19 NOTE — ASSESSMENT & PLAN NOTE
-HeartMate II Implanted 3/8/18 as DT with repositioning of outflow graft 3/9/18.  S/p chest closure 3/10/18  -CTS Primary  -Implanted by Dr. Kaufman  -Anticoagulation per Primary Coumadin  -Antiplatelets; Patient is enrolled in PREVENT II  -Speed set at 9000, LSL 8600 rpm  -Not listed for OHTx  - On ; Now off Marilia and EPi  - Lasix now off: CVP was 6 this morning

## 2018-03-20 DIAGNOSIS — Z95.811 HEART REPLACED BY HEART ASSIST DEVICE: Primary | ICD-10-CM

## 2018-03-20 DIAGNOSIS — I50.9 HEART FAILURE, UNSPECIFIED HEART FAILURE CHRONICITY, UNSPECIFIED HEART FAILURE TYPE: ICD-10-CM

## 2018-03-20 LAB
ANION GAP SERPL CALC-SCNC: 13 MMOL/L
APTT BLDCRRT: 38.2 SEC
APTT BLDCRRT: 41.2 SEC
APTT BLDCRRT: 42 SEC
APTT BLDCRRT: 42.6 SEC
APTT BLDCRRT: 44.5 SEC
APTT BLDCRRT: 44.5 SEC
BACTERIA BLD CULT: NORMAL
BACTERIA BLD CULT: NORMAL
BASOPHILS # BLD AUTO: 0.01 K/UL
BASOPHILS NFR BLD: 0.1 %
BUN SERPL-MCNC: 37 MG/DL
CALCIUM SERPL-MCNC: 9.8 MG/DL
CHLORIDE SERPL-SCNC: 92 MMOL/L
CO2 SERPL-SCNC: 27 MMOL/L
CREAT SERPL-MCNC: 1.8 MG/DL
DIFFERENTIAL METHOD: ABNORMAL
EOSINOPHIL # BLD AUTO: 0.1 K/UL
EOSINOPHIL NFR BLD: 0.8 %
ERYTHROCYTE [DISTWIDTH] IN BLOOD BY AUTOMATED COUNT: 16.2 %
EST. GFR  (AFRICAN AMERICAN): 49.3 ML/MIN/1.73 M^2
EST. GFR  (NON AFRICAN AMERICAN): 42.6 ML/MIN/1.73 M^2
FACT X PPP CHRO-ACNC: 0.16 IU/ML
FACT X PPP CHRO-ACNC: 0.16 IU/ML
FACT X PPP CHRO-ACNC: 0.17 IU/ML
FACT X PPP CHRO-ACNC: <0.1 IU/ML
GLUCOSE SERPL-MCNC: 95 MG/DL
HCT VFR BLD AUTO: 24.8 %
HGB BLD-MCNC: 7.7 G/DL
IMM GRANULOCYTES # BLD AUTO: 0.19 K/UL
IMM GRANULOCYTES NFR BLD AUTO: 1.3 %
INR PPP: 1.8
LDH SERPL L TO P-CCNC: 354 U/L
LYMPHOCYTES # BLD AUTO: 1.2 K/UL
LYMPHOCYTES NFR BLD: 8.3 %
MAGNESIUM SERPL-MCNC: 1.8 MG/DL
MAGNESIUM SERPL-MCNC: 1.8 MG/DL
MAGNESIUM SERPL-MCNC: 2 MG/DL
MAGNESIUM SERPL-MCNC: 2.1 MG/DL
MCH RBC QN AUTO: 25.8 PG
MCHC RBC AUTO-ENTMCNC: 31 G/DL
MCV RBC AUTO: 83 FL
MONOCYTES # BLD AUTO: 1.4 K/UL
MONOCYTES NFR BLD: 9.4 %
NEUTROPHILS # BLD AUTO: 11.5 K/UL
NEUTROPHILS NFR BLD: 80.1 %
NRBC BLD-RTO: 0 /100 WBC
PHOSPHATE SERPL-MCNC: 3.9 MG/DL
PLATELET # BLD AUTO: 323 K/UL
PMV BLD AUTO: 10.8 FL
POTASSIUM SERPL-SCNC: 3.8 MMOL/L
POTASSIUM SERPL-SCNC: 3.8 MMOL/L
POTASSIUM SERPL-SCNC: 4 MMOL/L
POTASSIUM SERPL-SCNC: 4 MMOL/L
POTASSIUM SERPL-SCNC: 4.1 MMOL/L
POTASSIUM SERPL-SCNC: 4.1 MMOL/L
PROTHROMBIN TIME: 17.5 SEC
RBC # BLD AUTO: 2.98 M/UL
SODIUM SERPL-SCNC: 132 MMOL/L
WBC # BLD AUTO: 14.32 K/UL

## 2018-03-20 PROCEDURE — 84132 ASSAY OF SERUM POTASSIUM: CPT | Mod: 91

## 2018-03-20 PROCEDURE — 80048 BASIC METABOLIC PNL TOTAL CA: CPT

## 2018-03-20 PROCEDURE — 63600175 PHARM REV CODE 636 W HCPCS: Performed by: PHYSICIAN ASSISTANT

## 2018-03-20 PROCEDURE — 27000248 HC VAD-ADDITIONAL DAY

## 2018-03-20 PROCEDURE — 63600175 PHARM REV CODE 636 W HCPCS: Performed by: THORACIC SURGERY (CARDIOTHORACIC VASCULAR SURGERY)

## 2018-03-20 PROCEDURE — 85730 THROMBOPLASTIN TIME PARTIAL: CPT | Mod: 91

## 2018-03-20 PROCEDURE — 85025 COMPLETE CBC W/AUTO DIFF WBC: CPT

## 2018-03-20 PROCEDURE — 97530 THERAPEUTIC ACTIVITIES: CPT

## 2018-03-20 PROCEDURE — 83735 ASSAY OF MAGNESIUM: CPT | Mod: 91

## 2018-03-20 PROCEDURE — A4216 STERILE WATER/SALINE, 10 ML: HCPCS | Performed by: THORACIC SURGERY (CARDIOTHORACIC VASCULAR SURGERY)

## 2018-03-20 PROCEDURE — 20600001 HC STEP DOWN PRIVATE ROOM

## 2018-03-20 PROCEDURE — 99232 SBSQ HOSP IP/OBS MODERATE 35: CPT | Mod: ,,, | Performed by: INTERNAL MEDICINE

## 2018-03-20 PROCEDURE — 83615 LACTATE (LD) (LDH) ENZYME: CPT

## 2018-03-20 PROCEDURE — 83735 ASSAY OF MAGNESIUM: CPT

## 2018-03-20 PROCEDURE — 25000003 PHARM REV CODE 250: Performed by: STUDENT IN AN ORGANIZED HEALTH CARE EDUCATION/TRAINING PROGRAM

## 2018-03-20 PROCEDURE — 97116 GAIT TRAINING THERAPY: CPT

## 2018-03-20 PROCEDURE — 84132 ASSAY OF SERUM POTASSIUM: CPT

## 2018-03-20 PROCEDURE — 85610 PROTHROMBIN TIME: CPT

## 2018-03-20 PROCEDURE — 93750 INTERROGATION VAD IN PERSON: CPT | Mod: ,,, | Performed by: INTERNAL MEDICINE

## 2018-03-20 PROCEDURE — 25000003 PHARM REV CODE 250: Performed by: NURSE PRACTITIONER

## 2018-03-20 PROCEDURE — 25000003 PHARM REV CODE 250: Performed by: THORACIC SURGERY (CARDIOTHORACIC VASCULAR SURGERY)

## 2018-03-20 PROCEDURE — 85520 HEPARIN ASSAY: CPT | Mod: 91

## 2018-03-20 PROCEDURE — 84100 ASSAY OF PHOSPHORUS: CPT

## 2018-03-20 RX ORDER — DOBUTAMINE HYDROCHLORIDE 400 MG/100ML
2.5 INJECTION, SOLUTION INTRAVENOUS CONTINUOUS
Status: DISCONTINUED | OUTPATIENT
Start: 2018-03-20 | End: 2018-03-21

## 2018-03-20 RX ORDER — LANOLIN ALCOHOL/MO/W.PET/CERES
400 CREAM (GRAM) TOPICAL 3 TIMES DAILY
Status: DISCONTINUED | OUTPATIENT
Start: 2018-03-20 | End: 2018-03-26 | Stop reason: HOSPADM

## 2018-03-20 RX ORDER — OXYCODONE HYDROCHLORIDE 10 MG/1
10 TABLET ORAL EVERY 6 HOURS PRN
Qty: 28 TABLET | Refills: 0 | Status: SHIPPED | OUTPATIENT
Start: 2018-03-20 | End: 2018-05-17

## 2018-03-20 RX ADMIN — MAGNESIUM OXIDE TAB 400 MG (241.3 MG ELEMENTAL MG) 400 MG: 400 (241.3 MG) TAB at 09:03

## 2018-03-20 RX ADMIN — OXYCODONE HYDROCHLORIDE 10 MG: 5 TABLET ORAL at 06:03

## 2018-03-20 RX ADMIN — DOBUTAMINE HYDROCHLORIDE 2.5 MCG/KG/MIN: 200 INJECTION INTRAVENOUS at 09:03

## 2018-03-20 RX ADMIN — DOBUTAMINE HYDROCHLORIDE 2.5 MCG/KG/MIN: 200 INJECTION INTRAVENOUS at 12:03

## 2018-03-20 RX ADMIN — Medication 3 ML: at 02:03

## 2018-03-20 RX ADMIN — Medication 81 MG: at 09:03

## 2018-03-20 RX ADMIN — MAGNESIUM OXIDE TAB 400 MG (241.3 MG ELEMENTAL MG) 400 MG: 400 (241.3 MG) TAB at 04:03

## 2018-03-20 RX ADMIN — FERROUS GLUCONATE TAB 324 MG (37.5 MG ELEMENTAL IRON) 324 MG: 324 (37.5 FE) TAB at 09:03

## 2018-03-20 RX ADMIN — OXYCODONE HYDROCHLORIDE 10 MG: 5 TABLET ORAL at 12:03

## 2018-03-20 RX ADMIN — AMIODARONE HYDROCHLORIDE 200 MG: 200 TABLET ORAL at 09:03

## 2018-03-20 RX ADMIN — POTASSIUM CHLORIDE 40 MEQ: 1500 TABLET, EXTENDED RELEASE ORAL at 09:03

## 2018-03-20 RX ADMIN — PANTOPRAZOLE SODIUM 40 MG: 40 TABLET, DELAYED RELEASE ORAL at 09:03

## 2018-03-20 RX ADMIN — WARFARIN SODIUM 7.5 MG: 2.5 TABLET ORAL at 04:03

## 2018-03-20 RX ADMIN — DOBUTAMINE IN DEXTROSE 4 MCG/KG/MIN: 200 INJECTION, SOLUTION INTRAVENOUS at 01:03

## 2018-03-20 RX ADMIN — FUROSEMIDE: 10 INJECTION, SOLUTION INTRAVENOUS at 04:03

## 2018-03-20 NOTE — SUBJECTIVE & OBJECTIVE
Interval History: Patient reports appetite has improved from yesterday.  He ate all his meals and he was hungry this morning.  He is hoping to be checked off on alarms today.  Otherwise he has no complaints     Continuous Infusions:   DOBUTamine 4 mcg/kg/min (03/20/18 0105)    custom IV infusion builder (for pharmacist use only) 10 mL/hr at 03/19/18 1703    heparin (porcine) in 5 % dex 1,700 Units/hr (03/19/18 2048)     Scheduled Meds:   amiodarone  200 mg Oral Daily    docusate sodium  200 mg Oral QHS    ferrous gluconate  324 mg Oral Daily with breakfast    INV aspirin/placebo  81 mg Oral Daily    magnesium oxide  400 mg Oral TID    pantoprazole  40 mg Oral Daily    polyethylene glycol  17 g Oral Daily    potassium chloride SA  40 mEq Oral BID    sodium chloride 0.9%  10 mL Intravenous Q6H    sodium chloride 0.9%  3 mL Intravenous Q8H    warfarin  7.5 mg Oral Daily     PRN Meds:acetaminophen, albuterol sulfate, bisacodyl, dextrose 50%, dextrose 50%, glucagon (human recombinant), glucose, glucose, insulin aspart U-100, magnesium hydroxide 400 mg/5 ml, oxyCODONE, oxyCODONE, Flushing PICC Protocol **AND** sodium chloride 0.9% **AND** sodium chloride 0.9%    Review of patient's allergies indicates:   Allergen Reactions    Lisinopril Anaphylaxis     Objective:     Vital Signs (Most Recent):  Temp: 98.3 °F (36.8 °C) (03/20/18 0745)  Pulse: 98 (03/20/18 0900)  Resp: 17 (03/20/18 0745)  BP: (!) 82/0 (03/20/18 0745)  SpO2: 97 % (03/20/18 0745) Vital Signs (24h Range):  Temp:  [98.3 °F (36.8 °C)-99.3 °F (37.4 °C)] 98.3 °F (36.8 °C)  Pulse:  [78-98] 98  Resp:  [16-18] 17  SpO2:  [95 %-98 %] 97 %  BP: (70-98)/(0-74) 82/0     Patient Vitals for the past 72 hrs (Last 3 readings):   Weight   03/20/18 0700 108 kg (238 lb 1.6 oz)   03/19/18 0700 108.8 kg (239 lb 13.8 oz)   03/18/18 0700 109.6 kg (241 lb 10 oz)     Body mass index is 31.41 kg/m².      Intake/Output Summary (Last 24 hours) at 03/20/18 0956  Last data  filed at 03/20/18 0700   Gross per 24 hour   Intake              900 ml   Output             2401 ml   Net            -1501 ml     Hemodynamic Parameters:    Physical Exam   Constitutional: He appears well-developed and well-nourished. No distress.   HENT:   Head: Normocephalic and atraumatic.   Neck: Normal range of motion. No JVD present.   Cardiovascular: Normal rate.  Exam reveals no friction rub.    No murmur heard.  Normal VAD hum   Pulmonary/Chest: Effort normal. No respiratory distress. He has no wheezes. He has no rales.   Abdominal: Soft. He exhibits no distension.   Musculoskeletal: Normal range of motion. He exhibits no edema.   Neurological: He is alert.   Skin: Skin is warm. Capillary refill takes 2 to 3 seconds. He is not diaphoretic. No erythema.     Significant Labs:  CBC:    Recent Labs  Lab 03/18/18  0326 03/19/18  0634 03/20/18  0617   WBC 17.76* 15.75* 14.32*   RBC 2.94* 3.05* 2.98*   HGB 7.9* 8.1* 7.7*   HCT 25.1* 25.7* 24.8*    299 323   MCV 85 84 83   MCH 26.9* 26.6* 25.8*   MCHC 31.5* 31.5* 31.0*     BNP:    Recent Labs  Lab 03/14/18  0440 03/16/18  0455 03/19/18  0633   BNP 1,324* 531* 398*     CMP:    Recent Labs  Lab 03/15/18  0852  03/16/18  0455  03/18/18  0326  03/19/18  0634  03/19/18  1820 03/20/18  0106 03/20/18  0617   *  --  135*  < > 105  --  101  --   --   --  95   CALCIUM 10.2  --  10.1  < > 9.6  --  10.0  --   --   --  9.8   ALBUMIN 2.9*  --  2.7*  --   --   --  2.9*  --   --   --   --    PROT 7.3  --  6.9  --   --   --  7.4  --   --   --   --    *  --  134*  < > 133*  --  133*  --   --   --  132*   K 3.8  < > 3.8  3.8  < > 3.7  3.7  < > 3.7  3.7  < > 3.9 4.0 3.8  3.8   CO2 32*  --  32*  < > 31*  --  29  --   --   --  27   CL 91*  --  90*  < > 91*  --  92*  --   --   --  92*   BUN 37*  --  36*  < > 40*  --  38*  --   --   --  37*   CREATININE 1.9*  --  1.8*  < > 2.1*  --  2.0*  --   --   --  1.8*   ALKPHOS 174*  --  167*  --   --   --  176*  --   --    --   --    ALT 38  --  40  --   --   --  57*  --   --   --   --    AST 58*  --  54*  --   --   --  63*  --   --   --   --    BILITOT 3.8*  --  2.9*  --   --   --  2.1*  --   --   --   --    < > = values in this interval not displayed.   Coagulation:     Recent Labs  Lab 03/18/18  0326  03/19/18  0634  03/19/18  2312 03/20/18  0106 03/20/18  0617   INR 1.6*  --  1.7*  --   --   --  1.8*   APTT 51.7*  51.7*  < > 54.6*  54.6*  < > 42.6* 42.0* 44.5*  44.5*   < > = values in this interval not displayed.  LDH:    Recent Labs  Lab 03/18/18  0326 03/19/18  0634 03/20/18  0617   * 396* 354*     Microbiology:  Microbiology Results (last 7 days)     Procedure Component Value Units Date/Time    Blood culture [[id number]] Collected:  03/15/18 1358    Order Status:  Completed Specimen:  Blood from Peripheral, Hand, Left Updated:  03/19/18 1812     Blood Culture, Routine No Growth to date     Blood Culture, Routine No Growth to date     Blood Culture, Routine No Growth to date     Blood Culture, Routine No Growth to date     Blood Culture, Routine No Growth to date    Blood culture [[id number]] Collected:  03/15/18 1407    Order Status:  Completed Specimen:  Blood from Peripheral, Antecubital, Left Updated:  03/19/18 1812     Blood Culture, Routine No Growth to date     Blood Culture, Routine No Growth to date     Blood Culture, Routine No Growth to date     Blood Culture, Routine No Growth to date     Blood Culture, Routine No Growth to date    Culture, Respiratory with Gram Stain [[id number]] Collected:  03/11/18 0800    Order Status:  Completed Specimen:  Respiratory from Endotracheal Aspirate Updated:  03/17/18 0931     Respiratory Culture No growth     Gram Stain (Respiratory) <10 epithelial cells per low power field.     Gram Stain (Respiratory) Few WBC's     Gram Stain (Respiratory) Rare Gram positive cocci     Gram Stain (Respiratory) Rare Gram positive rods    Urine culture [[id number]] Collected:  03/15/18 1431     Order Status:  Completed Specimen:  Urine from Urine, Clean Catch Updated:  03/16/18 2056     Urine Culture, Routine No growth    Blood culture [[id number]] Collected:  03/11/18 0650    Order Status:  Completed Specimen:  Blood from Peripheral, Hand, Right Updated:  03/16/18 1012     Blood Culture, Routine No growth after 5 days.    Blood culture [[id number]] Collected:  03/11/18 0651    Order Status:  Completed Specimen:  Blood from Peripheral, Wrist, Right Updated:  03/16/18 1012     Blood Culture, Routine No growth after 5 days.          I have reviewed all pertinent labs within the past 24 hours.    Estimated Creatinine Clearance: 62.6 mL/min (A) (based on SCr of 1.8 mg/dL (H)).    Diagnostic Results:  I have reviewed and interpreted all pertinent imaging results/findings within the past 24 hours.

## 2018-03-20 NOTE — PLAN OF CARE
[facility]   Heart Transplant/VAD Clinic   [address] [place] [address]   [phone] after hours          [phone] fax     VAD HOME  HEALTH ORDERS      Admit to Home Health    Diagnosis:   Patient Active Problem List   Diagnosis    Chronic systolic heart failure    Cigarette nicotine dependence without complication    Alcohol abuse    Pulmonary nodule seen on imaging study    Biventricular implantable cardioverter-defibrillator in situ    Acute on chronic combined systolic and diastolic heart failure    PVT (paroxysmal ventricular tachycardia)    High risk medications (amiodarone) long-term use    Dilated cardiomyopathy    Acute decompensated heart failure    CKD (chronic kidney disease), stage III    Hypertensive cardiovascular-renal disease, stage 1-4 or unspecified chronic kidney disease, with heart failure    Organ transplant candidate    LVAD (left ventricular assist device) present    Hyperglycemia    Hyperbilirubinemia    Anemia    Pneumothorax       Patient is homebound due to:      Diet: Low Fat, Low cholesterol, 2Gm Na, Coumadin restrictions.    Acitivities: No Swimming, bathing, vacuuming, contact sports.    Fresh implants= Sternal Precautions    Nursing:   SN to complete comprehensive assessment including routine vital signs. Instruct on disease process and s/s of complications to report to MD. Review/verify medication list sent home with the patient at time of discharge  and instruct patient/caregiver as needed. Frequency may be adjusted depending on start of care date.    **LVAD driveline exit site dressing change is to be completed per LVAD patient/caregiver only**.    Notify MD if SBP > 160 or < 90; DBP > 90 or < 50; HR > 120 or < 50; Temp > 101; Weight gain >3lbs in 1 day or 5lbs in 1 week.  Other:         LABS:  SN to perform labs: PT/INR per Coumadin clinic [phone].   Follow up INR date: Monday, March 26  No  Finger Sticks        CONSULTS:    Physical Therapy to evaluate and treat. Evaluate for home safety and equipment needs; Establish/upgrade home exercise program. Perform / instruct on therapeutic exercises, gait training, transfer training, and Range of Motion.    Occupational Therapy to evaluate and treat. Evaluate home environment for safety and equipment needs. Perform/Instruct on transfers, ADL training, ROM, and therapeutic exercises.      Aide to provide assistance with personal care, ADLs, and vital signs    Send initial Home Health orders to [facility] attending physician on call.  Send follow up questions to VAD clinic MD [phone] or [phone].

## 2018-03-20 NOTE — PLAN OF CARE
Problem: Occupational Therapy Goal  Goal: Occupational Therapy Goal  Goals to be met by: 3/26/18     Patient will increase functional independence with ADLs by performing:    Feeding with [place]. MET  UE Dressing with Supervision.   LE Dressing with Supervision.   Grooming while standing at sink with Supervision. MET  Toileting from toilet with Supervision for hygiene and clothing management.   Toilet transfer to toilet with Supervision.  Pt will be (I) with VAD management during ADL.      Outcome: Ongoing (interventions implemented as appropriate)  Continue OT POC; frequency decreased

## 2018-03-20 NOTE — PT/OT/SLP PROGRESS
Occupational Therapy   Treatment    Name: Tim Richards  MRN: [id number]  Admitting Diagnosis:  LVAD (left ventricular assist device) present  10 Days Post-Op    Recommendations:     Discharge Recommendations: home  Discharge Equipment Recommendations:  none  Barriers to discharge:  None    Subjective     Communicated with: RN prior to session. Pt agreeable on third attempt this date.  Pain/Comfort:  · Pain Rating 1:  (Did not rate)  · Location - Side 1: Left  · Location 1:  (pump site)  · Pain Addressed 1: Pre-medicate for activity, Reposition, Distraction    Patients cultural, spiritual, Advent conflicts given the current situation: None    Objective:     Patient found with: telemetry, PICC line, LVAD    General Precautions: Standard, fall, LVAD, sternal   Orthopedic Precautions:N/A   Braces: N/A     Occupational Performance:    Bed Mobility:     NT as pt sitting up in chair    Functional Mobility/Transfers:  · Patient completed Sit <> Stand Transfer with supervision with no assistive device from bedside chair   · Functional Mobility: Within room and hallway ~450 ft with supervision no AD; one seated rest break    Activities of Daily Living:  · Grooming: supervision to wash hands at sink  · Toileting: supervision for standing toileting    Patient left up in chair with all lines intact, call button in reach and wife present    [facility] 6 Click:  [facility] Total Score: 22    Treatment & Education:  Pt participated in functional mobility and ADLs as described above; progressing well with LVAD education (checked off) and functional mobility and POC frequency decreased accordingly  Education:    Assessment:     Tmi Richards is a 52 y.o. male with a medical diagnosis of LVAD (left ventricular assist device) present.  He presents with performance deficits including impaired endurance, impaired functional mobilty, impaired self care skills, impaired cardiopulmonary response to activity. Pt would continue to benefit  from OT to maximize independence before discharging home.     Rehab Prognosis:  Good; patient would benefit from acute skilled OT services to address these deficits and reach maximum level of function.       Plan:     Patient to be seen 3 x/week to address the above listed problems via self-care/home management, therapeutic activities, therapeutic exercises  · Plan of Care Expires: 04/17/18  · Plan of Care Reviewed with: patient, spouse    This Plan of care has been discussed with the patient who was involved in its development and understands and is in agreement with the identified goals and treatment plan    GOALS:    Occupational Therapy Goals        Problem: Occupational Therapy Goal    Goal Priority Disciplines Outcome Interventions   Occupational Therapy Goal     OT, PT/OT Ongoing (interventions implemented as appropriate)    Description:  Goals to be met by: 3/26/18     Patient will increase functional independence with ADLs by performing:    Feeding with [place]. MET  UE Dressing with Supervision.   LE Dressing with Supervision.   Grooming while standing at sink with Supervision. MET  Toileting from toilet with Supervision for hygiene and clothing management.   Toilet transfer to toilet with Supervision.  Pt will be (I) with VAD management during ADL.                       Time Tracking:     OT Date of Treatment: 03/20/18  OT Start Time: 1352  OT Stop Time: 1403  OT Total Time (min): 11 min    Billable Minutes:Therapeutic Activity 11 minutes    CLIFF Salgado  3/20/2018

## 2018-03-20 NOTE — PROGRESS NOTES
Spent 30 mins educating patient. Educational HM 2 DVD provided by company has been reviewed by patient.  VAD binder reviewed with patient including what to document and the meanings of all the parameters. Additionally, pointed out to patient where to find the proper phone numbers to call each individual VAD coordinator. We spoke about when to page the coordinator vs when to call during normal business hours. Possible medication the patient may encounter with a VAD were discussed including not changing prescriptions without talking to their VAD team. Explained that the pharmacist will further discuss before discharge and create them a purple card. Patient was able to successfully page the VAD coordinator and point out the proper places to find the Coordinator on calls phone number/emergency number. Patient was able to explain what the VAD is and how it works. Patient was able to successfully explained that if putnam are greater then 10 they will page the VAD coordinator. Proper severe weather plans were discussed including proper generator use and his evacuation plan. Patient explains they are not to use power module if it is plugged into a generator. Patient was able to successfully change controller and verbalize to page VAD coordinator prior to initiating controller change. Patient understands the locking mechanism for the driveline, sleep mode, and how to jump start the controller as needed. Patient was  Successfully able to explain the items he will have at his bedside at all times including flashlight, batteries, and clips. Patient was able to verbalize understanding that he must sleep on the power module. Patient was able to verbalize the alarms and all their meanings for the power module. Patient verbalized backup battery length in the power module. Patient explained that they will not leave the power module unplugged for greater then 18 hours. We discussed the battery charger. Patient was able to explain  that battery lifespan, charge length, and how to rotate batteries. Patient was able to explain how to calibrate batteries and when the proper time to do so.Patient was able to explain how to tell when the pump is running and what to do if it is not. Patient was able to verbalize  alarms hazard vs advisory alarms.    Patient is checked off on alarms per VAD coordinator.

## 2018-03-20 NOTE — ASSESSMENT & PLAN NOTE
-HeartMate II Implanted 3/8/18 as DT with repositioning of outflow graft 3/9/18.  S/p chest closure 3/10/18  -HTS Primary as of today  -Implanted by Dr. Kaufman  -Anticoagulation per Primary Coumadin  -Antiplatelets; Patient is enrolled in PREVENT II  -Speed set at 9000, LSL 8600 rpm  -Not listed for OHTx  - On ; repeat echo ordered for today.  Will go town to 2.5 today and try to wean prior to discharge  - Lasix now off: CVP was 6 this morning

## 2018-03-20 NOTE — PROGRESS NOTES
[facility]  Heart Transplant  Progress Note    Patient Name: Tim Richards  MRN: [id number]  Admission Date: 3/1/2018  Hospital Length of Stay: 19 days  Attending Physician: Yg Kaufman MD  Primary Care Provider: Ja Méndez MD  Principal Problem:LVAD (left ventricular assist device) present    Subjective:     Interval History: Patient reports appetite has improved from yesterday.  He ate all his meals and he was hungry this morning.  He is hoping to be checked off on alarms today.  Otherwise he has no complaints     Continuous Infusions:   DOBUTamine 4 mcg/kg/min (03/20/18 0105)    custom IV infusion builder (for pharmacist use only) 10 mL/hr at 03/19/18 1703    heparin (porcine) in 5 % dex 1,700 Units/hr (03/19/18 2048)     Scheduled Meds:   amiodarone  200 mg Oral Daily    docusate sodium  200 mg Oral QHS    ferrous gluconate  324 mg Oral Daily with breakfast    INV aspirin/placebo  81 mg Oral Daily    magnesium oxide  400 mg Oral TID    pantoprazole  40 mg Oral Daily    polyethylene glycol  17 g Oral Daily    potassium chloride SA  40 mEq Oral BID    sodium chloride 0.9%  10 mL Intravenous Q6H    sodium chloride 0.9%  3 mL Intravenous Q8H    warfarin  7.5 mg Oral Daily     PRN Meds:acetaminophen, albuterol sulfate, bisacodyl, dextrose 50%, dextrose 50%, glucagon (human recombinant), glucose, glucose, insulin aspart U-100, magnesium hydroxide 400 mg/5 ml, oxyCODONE, oxyCODONE, [place] PICC Protocol **AND** sodium chloride 0.9% **AND** sodium chloride 0.9%    Review of patient's allergies indicates:   Allergen Reactions    Lisinopril Anaphylaxis     Objective:     Vital Signs (Most Recent):  Temp: 98.3 °F (36.8 °C) (03/20/18 0745)  Pulse: 98 (03/20/18 0900)  Resp: 17 (03/20/18 0745)  BP: (!) 82/0 (03/20/18 0745)  SpO2: 97 % (03/20/18 0745) Vital Signs (24h Range):  Temp:  [98.3 °F (36.8 °C)-99.3 °F (37.4 °C)] 98.3 °F (36.8 °C)  Pulse:  [78-98] 98  Resp:  [16-18]  17  SpO2:  [95 %-98 %] 97 %  BP: (70-98)/(0-74) 82/0     Patient Vitals for the past 72 hrs (Last 3 readings):   Weight   03/20/18 0700 108 kg (238 lb 1.6 oz)   03/19/18 0700 108.8 kg (239 lb 13.8 oz)   03/18/18 0700 109.6 kg (241 lb 10 oz)     Body mass index is 31.41 kg/m².      Intake/Output Summary (Last 24 hours) at 03/20/18 0956  Last data filed at 03/20/18 0700   Gross per 24 hour   Intake              900 ml   Output             2401 ml   Net            -1501 ml     Hemodynamic Parameters:    Physical Exam   Constitutional: He appears well-developed and well-nourished. No distress.   HENT:   Head: Normocephalic and atraumatic.   Neck: Normal range of motion. No JVD present.   Cardiovascular: Normal rate.  Exam reveals no friction rub.    No murmur heard.  Normal VAD hum   Pulmonary/Chest: Effort normal. No respiratory distress. He has no wheezes. He has no rales.   Abdominal: Soft. He exhibits no distension.   Musculoskeletal: Normal range of motion. He exhibits no edema.   Neurological: He is alert.   Skin: Skin is warm. Capillary refill takes 2 to 3 seconds. He is not diaphoretic. No erythema.     Significant Labs:  CBC:    Recent Labs  Lab 03/18/18  0326 03/19/18  0634 03/20/18  0617   WBC 17.76* 15.75* 14.32*   RBC 2.94* 3.05* 2.98*   HGB 7.9* 8.1* 7.7*   HCT 25.1* 25.7* 24.8*    299 323   MCV 85 84 83   MCH 26.9* 26.6* 25.8*   MCHC 31.5* 31.5* 31.0*     BNP:    Recent Labs  Lab 03/14/18  0440 03/16/18  0455 03/19/18  0633   BNP 1,324* 531* 398*     CMP:    Recent Labs  Lab 03/15/18  0852  03/16/18  0455  03/18/18  0326  03/19/18  0634  03/19/18  1820 03/20/18  0106 03/20/18  0617   *  --  135*  < > 105  --  101  --   --   --  95   CALCIUM 10.2  --  10.1  < > 9.6  --  10.0  --   --   --  9.8   ALBUMIN 2.9*  --  2.7*  --   --   --  2.9*  --   --   --   --    PROT 7.3  --  6.9  --   --   --  7.4  --   --   --   --    *  --  134*  < > 133*  --  133*  --   --   --  132*   K 3.8  < > 3.8   3.8  < > 3.7  3.7  < > 3.7  3.7  < > 3.9 4.0 3.8  3.8   CO2 32*  --  32*  < > 31*  --  29  --   --   --  27   CL 91*  --  90*  < > 91*  --  92*  --   --   --  92*   BUN 37*  --  36*  < > 40*  --  38*  --   --   --  37*   CREATININE 1.9*  --  1.8*  < > 2.1*  --  2.0*  --   --   --  1.8*   ALKPHOS 174*  --  167*  --   --   --  176*  --   --   --   --    ALT 38  --  40  --   --   --  57*  --   --   --   --    AST 58*  --  54*  --   --   --  63*  --   --   --   --    BILITOT 3.8*  --  2.9*  --   --   --  2.1*  --   --   --   --    < > = values in this interval not displayed.   Coagulation:     Recent Labs  Lab 03/18/18  0326  03/19/18  0634  03/19/18  2312 03/20/18  0106 03/20/18  0617   INR 1.6*  --  1.7*  --   --   --  1.8*   APTT 51.7*  51.7*  < > 54.6*  54.6*  < > 42.6* 42.0* 44.5*  44.5*   < > = values in this interval not displayed.  LDH:    Recent Labs  Lab 03/18/18  0326 03/19/18  0634 03/20/18  0617   * 396* 354*     Microbiology:  Microbiology Results (last 7 days)     Procedure Component Value Units Date/Time    Blood culture [[id number]] Collected:  03/15/18 1358    Order Status:  Completed Specimen:  Blood from Peripheral, Hand, Left Updated:  03/19/18 1812     Blood Culture, Routine No Growth to date     Blood Culture, Routine No Growth to date     Blood Culture, Routine No Growth to date     Blood Culture, Routine No Growth to date     Blood Culture, Routine No Growth to date    Blood culture [[id number]] Collected:  03/15/18 1407    Order Status:  Completed Specimen:  Blood from Peripheral, Antecubital, Left Updated:  03/19/18 1812     Blood Culture, Routine No Growth to date     Blood Culture, Routine No Growth to date     Blood Culture, Routine No Growth to date     Blood Culture, Routine No Growth to date     Blood Culture, Routine No Growth to date    Culture, Respiratory with Gram Stain [[id number]] Collected:  03/11/18 0800    Order Status:  Completed Specimen:  Respiratory from  Endotracheal Aspirate Updated:  03/17/18 0931     Respiratory Culture No growth     Gram Stain (Respiratory) <10 epithelial cells per low power field.     Gram Stain (Respiratory) Few WBC's     Gram Stain (Respiratory) Rare Gram positive cocci     Gram Stain (Respiratory) Rare Gram positive rods    Urine culture [[id number]] Collected:  03/15/18 1431    Order Status:  Completed Specimen:  Urine from Urine, Clean Catch Updated:  03/16/18 2056     Urine Culture, Routine No growth    Blood culture [[id number]] Collected:  03/11/18 0650    Order Status:  Completed Specimen:  Blood from Peripheral, Hand, Right Updated:  03/16/18 1012     Blood Culture, Routine No growth after 5 days.    Blood culture [[id number]] Collected:  03/11/18 0651    Order Status:  Completed Specimen:  Blood from Peripheral, Wrist, Right Updated:  03/16/18 1012     Blood Culture, Routine No growth after 5 days.          I have reviewed all pertinent labs within the past 24 hours.    Estimated Creatinine Clearance: 62.6 mL/min (A) (based on SCr of 1.8 mg/dL (H)).    Diagnostic Results:  I have reviewed and interpreted all pertinent imaging results/findings within the past 24 hours.    Assessment and Plan:     * LVAD (left ventricular assist device) present    -HeartMate II Implanted 3/8/18 as DT with repositioning of outflow graft 3/9/18.  S/p chest closure 3/10/18  -HTS Primary as of today  -Implanted by Dr. Kaufman  -Anticoagulation per Primary Coumadin  -Antiplatelets; Patient is enrolled in PREVENT II  -Speed set at 9000, LSL 8600 rpm  -Not listed for OHTx  - On ; repeat echo ordered for today.  Will go town to 2.5 today and try to wean prior to discharge  - Lasix now off: CVP was 6 this morning          Acute on chronic combined systolic and diastolic heart failure    -NICM   -Echo prior to VAD- EF: <10%; LVEDD: 9.7 cm  -now s/p HM II LVAD- most recent Echo 3/14/18- LVEDD 8.5             CKD (chronic kidney disease), stage III    -creatinine  stable and at baseline  -Baseline creat 1.6-2.0.  -appears euvolemic , will monitor creatinine trends off Lasix        PVT (paroxysmal ventricular tachycardia)    -has ICD  -Transitioned from IV Amiodarone infusion to po         Hyperbilirubinemia    -likely secondary to above, trending down  -RUQ ultrasound neg        Biventricular implantable cardioverter-defibrillator in situ    - in place  - ICD interrogated, event noted on 2/25/18 with appropriate shock for VT  -Continue Amiodarone po        Anemia    -Transfused a unit PRBC's 3/12  -H/H stable  - Will get iron studies for am tomorrow         Pneumothorax    -IR unable to place chest tube or pig tail; Pneumothorax has resolved on CXR            YANET Coleman  Heart Transplant  [facility]

## 2018-03-20 NOTE — PLAN OF CARE
Problem: Physical Therapy Goal  Goal: Physical Therapy Goal  Goals to be met by: 3/26     Patient will increase functional independence with mobility by performin. Supine to sit with Stand-by Assistance-not met  2. Sit to supine with Stand-by Assistance-not met  3. Sit to stand transfer with Stand-by Assistance - MET 3/16  4. Gait  x 100 feet with Stand-by Assistance - MET 3/17  5. Pt sit to/from stand with supervision- MET 3/20/2018  6. Pt receive gait training ~ 500 ft with supervision and no rest periods- not met   7. Pt will ascend/descend 4 steps w/ (L) rail for balance, CGA for safety- MET 3/20/2018  Outcome: Ongoing (interventions implemented as appropriate)  LTGs remain appropriate. Pt will continue PT POC.    Corrine Miller, PT  3/20/2018

## 2018-03-20 NOTE — PT/OT/SLP PROGRESS
Physical Therapy Treatment    Patient Name:  Tim Richards   MRN:  [id number]    Recommendations:     Discharge Recommendations:  home   Discharge Equipment Recommendations: none   Barriers to discharge: Inaccessible home (4 steps to enter home)    Assessment:     Tim Richards is a 52 y.o. male admitted with a medical diagnosis of LVAD (left ventricular assist device) present.  He presents with the following impairments/functional limitations:  weakness, impaired endurance, impaired functional mobilty, gait instability, impaired balance . Pt meredith session well w/ good participation. He completed stair training today for the first time and was successful. Pt is progressing well but remains limited by decreased endurance and continues to require seated rest breaks during gait trials. Pt will continue PT POC.    Rehab Prognosis:  Good; patient would benefit from acute skilled PT services to address these deficits and reach maximum level of function.      Recent Surgery: Procedure(s) (LRB):  CLOSURE-STERNAL WOUND (N/A)  PLACEMENT-NEOPERICARDIUM 10 Days Post-Op    Plan:     During this hospitalization, patient to be seen 3 x/week to address the above listed problems via gait training, therapeutic activities  · Plan of Care Expires:  04/11/18   Plan of Care Reviewed with: patient    Subjective     Communicated with nursing prior to session.  Patient found UIC upon PT entry to room, agreeable to treatment.      Chief Complaint: decreased endurance  Patient comments/goals: to get stronger and return home  Pain/Comfort:  · Pain Rating 1: 0/10    Patients cultural, spiritual, Mosque conflicts given the current situation: none reported    Objective:     Patient found with: telemetry, peripheral IV, LVAD (LVAD to battery power and in \[facility]\"")     General Precautions: Standard, fall, LVAD, sternal   Orthopedic Precautions:N/A   Braces: N/A     Functional Mobility:  · Transfers:     · Sit to Stand:  supervision with no  AD, from bedside chair  · Pt maintains sternal precautions  · Gait:   · 400ft (w/ 2 seated rest breaks due to fatigue), w/o AD w/ SPV for safety  · Balance:   · Static stand w/o AD w/ SPV  · Stairs:    · Pt ascended/descended 4 stair(s) with No Assistive Device with left handrail with Contact Guard Assistance.   · Hand rail used for balance only      AM-PAC 6 CLICK MOBILITY  Turning over in bed (including adjusting bedclothes, sheets and blankets)?: 4  Sitting down on and standing up from a chair with arms (e.g., wheelchair, bedside commode, etc.): 4  Moving from lying on back to sitting on the side of the bed?: 4  Moving to and from a bed to a chair (including a wheelchair)?: 4  Need to walk in hospital room?: 3  Climbing 3-5 steps with a railing?: 3  Total Score: 22       Therapeutic Activities and Exercises:   Pt educated on the importance of walking 3-4X/day in order to improve endurance. Pt verb understanding. White board was updated.    Patient left up in chair with all lines intact and call button in reach..    GOALS:    Physical Therapy Goals        Problem: Physical Therapy Goal    Goal Priority Disciplines Outcome Goal Variances Interventions   Physical Therapy Goal     PT/OT, PT Ongoing (interventions implemented as appropriate)     Description:  Goals to be met by: 3/26     Patient will increase functional independence with mobility by performin. Supine to sit with Stand-by Assistance-not met  2. Sit to supine with Stand-by Assistance-not met  3. Sit to stand transfer with Stand-by Assistance - MET 3/16  4. Gait  x 100 feet with Stand-by Assistance - MET 3/17  5. Pt sit to/from stand with supervision- MET 3/20/2018  6. Pt receive gait training ~ 500 ft with supervision and no rest periods- not met   7. Pt will ascend/descend 4 steps w/ (L) rail for balance, CGA for safety- MET 3/20/2018                    Time Tracking:     PT Received On: 18  PT Start Time: 929     PT Stop Time: 956  PT  Total Time (min): 27 min     Billable Minutes: Gait Training 13, Therapeutic Activity 14 and Total Time 27    Treatment Type: Treatment  PT/PTA: PT     PTA Visit Number: 0     Corrine Miller, PT  03/20/2018

## 2018-03-20 NOTE — PLAN OF CARE
Problem: Patient Care Overview  Goal: Plan of Care Review  Outcome: Ongoing (interventions implemented as appropriate)  Patient remains free of falls or injury. Patient denies pain. Continued on heparin, lasix, and dobutamine drips. Wife checked off on LVAD DLES dressing change per day shift RN. Patient and wife to be checked off on alarms. Patient working with PT/OT. CVPs daily. Plan of care reviewed with patient.

## 2018-03-20 NOTE — PLAN OF CARE
Problem: Patient Care Overview  Goal: Individualization & Mutuality  PMHx:CHF, DCM, CKD, gout, HTN, V. Tach, AICD, colonoscopy   1/12-1/17: txtmt for ADHF. RHC      3/1: Admitted for LVAD placement   3/8: LVAD placement  3/9: washout chest closure  3/12: 1 u PRBC  314: abdominal ultrasound, echo, oob to chair    APTT goal 40-50        Plan of care discussed with patient.  Patient ambulating with stand-by assistance, fall precautions in place.Continuing to encourage sternal precautions, and ambulation. LVAD DP and numbers WNL, smooth LVAD hum. Patient has no complaints of pain. Discussed medications and care. Encouraged workbook, reviewed education, filled in binder. VSS. AAOx4. Heparin, lasix, and dobutamine gtts continued. Patient has no questions at this time. Will continue to monitor.

## 2018-03-20 NOTE — PROGRESS NOTES
Notified Cassy,NP that pt's PTT=38.2, Goal 40-50. Heparin currently at 1700U. NP ordered to go up to 1800U. Will continue to monitor pt.

## 2018-03-20 NOTE — PROCEDURES
Patient sitting in chair NAD. VAD interrogation completed this AM in the event changes needed to be made. Will continue to monitor for further issues.     Pulsatile: Yes, intermittent   VAD Sounds: Smooth  Problems / Issues / Alarms with VAD if any: None noted      VAD Interrogation:  TXP LVAD INTERROGATIONS 3/20/2018 3/20/2018 3/20/2018 3/19/2018 3/19/2018 3/19/2018 3/19/2018   Type - (No Data) HeartMate II HeartMate II HeartMate II - HeartMate II   Flow 5.5 - 6.4 5.6 6.1 5.9 6.4   Speed 9000 - 9000 9000 9000 9000 9000   PI 6.0 - 5.5 5.9 5.3 5.5 5.5   Power ([place]) 5.4 - 6.1 5.5 5.7 5.6 5.7   LSL 8600 - 8600 8600 8600 - -   Pulsatility Intermittent pulse - Intermittent pulse Intermittent pulse Intermittent pulse - -   }

## 2018-03-20 NOTE — PROGRESS NOTES
UPDATE    Pt presents in room with LVAD coordinators receiving education. Pt aaox4 with pleasant affect. Pt denies preference for HH and gives SW permission for release of information for discharge planning and pt care needs. Pt reports coping well and denies further needs, questions, concerns at this time and none indicated. Providing ongoing psychosocial and counseling support, education, resources, assistance and discharge planning as indicated. Following and available.    SW made verbal referral to [facility].    Addendum: [facility] unable to accept pt due to pt's insurance. MARIA DEL CARMEN faxed referral and orders\ along with demographic sheet, orders, H&P, progress notes and MAR to [facility] ph [phone]. Fax [phone].

## 2018-03-20 NOTE — ASSESSMENT & PLAN NOTE
-creatinine stable and at baseline  -Baseline creat 1.6-2.0.  -appears euvolemic , will monitor creatinine trends off Lasix

## 2018-03-21 LAB
ALBUMIN SERPL BCP-MCNC: 2.8 G/DL
ALP SERPL-CCNC: 157 U/L
ALT SERPL W/O P-5'-P-CCNC: 50 U/L
ANION GAP SERPL CALC-SCNC: 10 MMOL/L
APTT BLDCRRT: 43.1 SEC
APTT BLDCRRT: 43.9 SEC
APTT BLDCRRT: 43.9 SEC
AST SERPL-CCNC: 47 U/L
BASOPHILS # BLD AUTO: 0.01 K/UL
BASOPHILS NFR BLD: 0.1 %
BILIRUB DIRECT SERPL-MCNC: 1.2 MG/DL
BILIRUB SERPL-MCNC: 1.5 MG/DL
BNP SERPL-MCNC: 565 PG/ML
BUN SERPL-MCNC: 39 MG/DL
CALCIUM SERPL-MCNC: 9.8 MG/DL
CHLORIDE SERPL-SCNC: 92 MMOL/L
CO2 SERPL-SCNC: 28 MMOL/L
CREAT SERPL-MCNC: 2.2 MG/DL
CRP SERPL-MCNC: 82.9 MG/L
DIFFERENTIAL METHOD: ABNORMAL
EOSINOPHIL # BLD AUTO: 0.1 K/UL
EOSINOPHIL NFR BLD: 0.7 %
ERYTHROCYTE [DISTWIDTH] IN BLOOD BY AUTOMATED COUNT: 16.2 %
EST. GFR  (AFRICAN AMERICAN): 38.7 ML/MIN/1.73 M^2
EST. GFR  (NON AFRICAN AMERICAN): 33.4 ML/MIN/1.73 M^2
FACT X PPP CHRO-ACNC: 0.18 IU/ML
FACT X PPP CHRO-ACNC: 0.19 IU/ML
FERRITIN SERPL-MCNC: 466 NG/ML
GLUCOSE SERPL-MCNC: 103 MG/DL
HCT VFR BLD AUTO: 25.5 %
HGB BLD-MCNC: 7.7 G/DL
IMM GRANULOCYTES # BLD AUTO: 0.2 K/UL
IMM GRANULOCYTES NFR BLD AUTO: 1.4 %
INR PPP: 2
IRON SERPL-MCNC: 21 UG/DL
LDH SERPL L TO P-CCNC: 340 U/L
LYMPHOCYTES # BLD AUTO: 1.2 K/UL
LYMPHOCYTES NFR BLD: 8.4 %
MAGNESIUM SERPL-MCNC: 2.1 MG/DL
MAGNESIUM SERPL-MCNC: 2.1 MG/DL
MCH RBC QN AUTO: 25.7 PG
MCHC RBC AUTO-ENTMCNC: 30.2 G/DL
MCV RBC AUTO: 85 FL
MONOCYTES # BLD AUTO: 1.6 K/UL
MONOCYTES NFR BLD: 10.8 %
NEUTROPHILS # BLD AUTO: 11.5 K/UL
NEUTROPHILS NFR BLD: 78.6 %
NRBC BLD-RTO: 0 /100 WBC
PHOSPHATE SERPL-MCNC: 4 MG/DL
PLATELET # BLD AUTO: 369 K/UL
PMV BLD AUTO: 10.4 FL
POTASSIUM SERPL-SCNC: 4.3 MMOL/L
POTASSIUM SERPL-SCNC: 4.3 MMOL/L
PREALB SERPL-MCNC: 19 MG/DL
PROT SERPL-MCNC: 7.2 G/DL
PROTHROMBIN TIME: 19.2 SEC
RBC # BLD AUTO: 3 M/UL
SATURATED IRON: 7 %
SODIUM SERPL-SCNC: 130 MMOL/L
TOTAL IRON BINDING CAPACITY: 317 UG/DL
TRANSFERRIN SERPL-MCNC: 214 MG/DL
WBC # BLD AUTO: 14.56 K/UL

## 2018-03-21 PROCEDURE — 93750 INTERROGATION VAD IN PERSON: CPT | Mod: ,,, | Performed by: INTERNAL MEDICINE

## 2018-03-21 PROCEDURE — 97803 MED NUTRITION INDIV SUBSEQ: CPT | Performed by: DIETITIAN, REGISTERED

## 2018-03-21 PROCEDURE — 84100 ASSAY OF PHOSPHORUS: CPT

## 2018-03-21 PROCEDURE — 85025 COMPLETE CBC W/AUTO DIFF WBC: CPT

## 2018-03-21 PROCEDURE — 25000003 PHARM REV CODE 250: Performed by: THORACIC SURGERY (CARDIOTHORACIC VASCULAR SURGERY)

## 2018-03-21 PROCEDURE — 63600175 PHARM REV CODE 636 W HCPCS: Performed by: PHYSICIAN ASSISTANT

## 2018-03-21 PROCEDURE — 83540 ASSAY OF IRON: CPT

## 2018-03-21 PROCEDURE — 99232 SBSQ HOSP IP/OBS MODERATE 35: CPT | Mod: ,,, | Performed by: INTERNAL MEDICINE

## 2018-03-21 PROCEDURE — 82728 ASSAY OF FERRITIN: CPT

## 2018-03-21 PROCEDURE — 25000003 PHARM REV CODE 250: Performed by: NURSE PRACTITIONER

## 2018-03-21 PROCEDURE — 86140 C-REACTIVE PROTEIN: CPT

## 2018-03-21 PROCEDURE — 83735 ASSAY OF MAGNESIUM: CPT

## 2018-03-21 PROCEDURE — 27000248 HC VAD-ADDITIONAL DAY

## 2018-03-21 PROCEDURE — 83615 LACTATE (LD) (LDH) ENZYME: CPT

## 2018-03-21 PROCEDURE — 94761 N-INVAS EAR/PLS OXIMETRY MLT: CPT

## 2018-03-21 PROCEDURE — 20600001 HC STEP DOWN PRIVATE ROOM

## 2018-03-21 PROCEDURE — 85520 HEPARIN ASSAY: CPT

## 2018-03-21 PROCEDURE — 84134 ASSAY OF PREALBUMIN: CPT

## 2018-03-21 PROCEDURE — 25000003 PHARM REV CODE 250: Performed by: STUDENT IN AN ORGANIZED HEALTH CARE EDUCATION/TRAINING PROGRAM

## 2018-03-21 PROCEDURE — 85610 PROTHROMBIN TIME: CPT

## 2018-03-21 PROCEDURE — 83880 ASSAY OF NATRIURETIC PEPTIDE: CPT

## 2018-03-21 PROCEDURE — 80048 BASIC METABOLIC PNL TOTAL CA: CPT

## 2018-03-21 PROCEDURE — 85730 THROMBOPLASTIN TIME PARTIAL: CPT

## 2018-03-21 PROCEDURE — 63600175 PHARM REV CODE 636 W HCPCS: Performed by: NURSE PRACTITIONER

## 2018-03-21 PROCEDURE — 97530 THERAPEUTIC ACTIVITIES: CPT

## 2018-03-21 PROCEDURE — 80076 HEPATIC FUNCTION PANEL: CPT

## 2018-03-21 RX ORDER — FUROSEMIDE 10 MG/ML
80 INJECTION INTRAMUSCULAR; INTRAVENOUS 2 TIMES DAILY
Status: DISCONTINUED | OUTPATIENT
Start: 2018-03-21 | End: 2018-03-22

## 2018-03-21 RX ADMIN — Medication 81 MG: at 08:03

## 2018-03-21 RX ADMIN — WARFARIN SODIUM 7.5 MG: 2.5 TABLET ORAL at 04:03

## 2018-03-21 RX ADMIN — MAGNESIUM OXIDE TAB 400 MG (241.3 MG ELEMENTAL MG) 400 MG: 400 (241.3 MG) TAB at 04:03

## 2018-03-21 RX ADMIN — OXYCODONE HYDROCHLORIDE 10 MG: 5 TABLET ORAL at 12:03

## 2018-03-21 RX ADMIN — PANTOPRAZOLE SODIUM 40 MG: 40 TABLET, DELAYED RELEASE ORAL at 08:03

## 2018-03-21 RX ADMIN — DOCUSATE SODIUM 200 MG: 100 CAPSULE, LIQUID FILLED ORAL at 09:03

## 2018-03-21 RX ADMIN — OXYCODONE HYDROCHLORIDE 10 MG: 5 TABLET ORAL at 08:03

## 2018-03-21 RX ADMIN — FUROSEMIDE 80 MG: 10 INJECTION, SOLUTION INTRAMUSCULAR; INTRAVENOUS at 05:03

## 2018-03-21 RX ADMIN — OXYCODONE HYDROCHLORIDE 10 MG: 5 TABLET ORAL at 04:03

## 2018-03-21 RX ADMIN — AMIODARONE HYDROCHLORIDE 200 MG: 200 TABLET ORAL at 08:03

## 2018-03-21 RX ADMIN — MAGNESIUM OXIDE TAB 400 MG (241.3 MG ELEMENTAL MG) 400 MG: 400 (241.3 MG) TAB at 08:03

## 2018-03-21 RX ADMIN — MAGNESIUM OXIDE TAB 400 MG (241.3 MG ELEMENTAL MG) 400 MG: 400 (241.3 MG) TAB at 09:03

## 2018-03-21 RX ADMIN — FERROUS GLUCONATE TAB 324 MG (37.5 MG ELEMENTAL IRON) 324 MG: 324 (37.5 FE) TAB at 08:03

## 2018-03-21 RX ADMIN — HEPARIN SODIUM 1800 UNITS/HR: 10000 INJECTION, SOLUTION INTRAVENOUS at 06:03

## 2018-03-21 NOTE — PLAN OF CARE
Problem: Patient Care Overview  Goal: Plan of Care Review    Recommendations     Recommendation/Intervention:   1. Continue current cardiac diet. Pt with good appetite/intake at this time.   2. Coumadin education provided with emphasis on low Na, generally healthy diet.   3. RD following.     Goals: Intake >/=85% EEN/EPN  Nutrition Goal Status: goal met

## 2018-03-21 NOTE — PT/OT/SLP PROGRESS
Occupational Therapy   Treatment    Name: Tim Richards  MRN: [id number]  Admitting Diagnosis:  LVAD (left ventricular assist device) present  11 Days Post-Op    Recommendations:     Discharge Recommendations: home  Discharge Equipment Recommendations:  none  Barriers to discharge:  None     Subjective     Communicated with: RN prior to session.  Pain/Comfort:  · Pain Rating 1: 0/10  · Pain Rating Post-Intervention 1: 0/10    Patients cultural, spiritual, Oriental orthodox conflicts given the current situation: None    Objective:     Patient found with: telemetry, LVAD    General Precautions: Standard, fall, LVAD, sternal   Orthopedic Precautions:N/A   Braces: N/A     Occupational Performance:    Bed Mobility:    · Patient completed Supine to Sit with modified independence     Functional Mobility/Transfers:  · Patient completed Sit <> Stand Transfer with modified independence with no assistive device from EOB  · Functional Mobility: around unit ~450 ft with supervision no AD, one seated rest break needed; pt managing emergency bag    Activities of Daily Living:  · UB Dressing: modified independence to don LVAD holster; assist to tighten waist strap  · LB Dressing: supervision to don shoes   · Pt able to get to and from bathroom for toileting and grooming without assistance    Patient left seated EOB with all lines intact and call button in reach    AMPA 6 Click:  AMPA Total Score: 22    Treatment & Education:  Pt rotated batteries and switched to LVAD to battery power (I); completed functional mobility around unit with supervision and one seated rest break;   Education:    Assessment:     Tim Richards is a 52 y.o. male with a medical diagnosis of LVAD (left ventricular assist device) present.  He presents with performance deficits incluidng impaired endurance, impaired functional mobilty, impaired cardiopulmonary response to activity. Pt would benefit from 1-2 more OT sessions to maximize independence before  returning home.    Rehab Prognosis:  Good; patient would benefit from acute skilled OT services to address these deficits and reach maximum level of function.       Plan:     Patient to be seen 3 x/week to address the above listed problems via self-care/home management, therapeutic activities, therapeutic exercises  · Plan of Care Expires: 04/17/18  · Plan of Care Reviewed with: patient    This Plan of care has been discussed with the patient who was involved in its development and understands and is in agreement with the identified goals and treatment plan    GOALS:    Occupational Therapy Goals        Problem: Occupational Therapy Goal    Goal Priority Disciplines Outcome Interventions   Occupational Therapy Goal     OT, PT/OT Ongoing (interventions implemented as appropriate)    Description:  Goals to be met by: 3/26/18     Patient will increase functional independence with ADLs by performing:    Feeding with [place]. MET  UE Dressing with Supervision.   LE Dressing with Supervision.   Grooming while standing at sink with Supervision. MET  Toileting from toilet with Supervision for hygiene and clothing management.   Toilet transfer to toilet with Supervision.  Pt will be (I) with VAD management during ADL.                       Time Tracking:     OT Date of Treatment: 03/21/18  OT Start Time: 1047  OT Stop Time: 1104  OT Total Time (min): 17 min    Billable Minutes:Therapeutic Activity 17 minutes    CLIFF Salgado  3/21/2018

## 2018-03-21 NOTE — PROGRESS NOTES
2 week dressing supplies given to pt; counts verified by wife Kelly; Supply list initial by myself and Kelly; copy place in chart and also given to patient

## 2018-03-21 NOTE — PROGRESS NOTES
Spoke with patient today. No family at bedside. Educated on Vitamin K; asked dietician for coumadin education. Jose F to see for equipment education.  All question answered with verbalization of understanding. Will continue to monitor patients status.

## 2018-03-21 NOTE — PROCEDURES
Patient awake with no family at bedside. VAD interrogation completed this AM in the event changes needed to be made. Will continue to monitor for further issues.     Pulsatile: Yes, intermittent   VAD Sounds: HM3  Smooth  Problems / Issues / Alarms with VAD if any: None noted      VAD Interrogation:  TXP LVAD INTERROGATIONS 3/21/2018 3/21/2018 3/21/2018 3/20/2018 3/20/2018 3/20/2018 3/20/2018   Type (No Data) HeartMate II HeartMate II HeartMate II - - HeartMate II   Flow - 6.1 7.0 6.0 5.7 5.6 5.5   Speed - 9000 9000 9000 9000 9000 9000   PI - 5.6 4.8 6.1 6.0 5.9 6.0   Power ([place]) - 5.7 6.3 5.7 5.6 5.5 5.4   LSL - 8600 8600 8600 - - 8600   Pulsatility - Intermittent pulse Intermittent pulse Intermittent pulse - - Intermittent pulse   }

## 2018-03-21 NOTE — PLAN OF CARE
Problem: Patient Care Overview  Goal: Plan of Care Review  Outcome: Ongoing (interventions implemented as appropriate)  Patient remains free of falls or injury. Patient denies pain. Continued on heparin, lasix, and dobutamine drips. Wife checked off on LVAD DLES dressing change per day shift RN. Patient checked off on alarms per VAD coordinator. LVAD dressing change supplies ordered and need to be checked off with patient. Patient working with PT/OT. CVPs daily. Plan of care reviewed with patient.

## 2018-03-21 NOTE — PLAN OF CARE
Problem: Occupational Therapy Goal  Goal: Occupational Therapy Goal  Goals to be met by: 3/26/18     Patient will increase functional independence with ADLs by performing:    Feeding with [place]. MET  UE Dressing with Supervision.   LE Dressing with Supervision.   Grooming while standing at sink with Supervision. MET  Toileting from toilet with Supervision for hygiene and clothing management.   Toilet transfer to toilet with Supervision.  Pt will be (I) with VAD management during ADL.      Outcome: Ongoing (interventions implemented as appropriate)  Continue OT plan of care.

## 2018-03-21 NOTE — PROGRESS NOTES
" [facility]  Adult Nutrition  Progress Note    SUMMARY       Recommendations    Recommendation/Intervention:   1. Continue current cardiac diet. Pt with good appetite/intake at this time.   2. Coumadin education provided with emphasis on low Na, generally healthy diet.   3. RD following.    Goals: Intake >/=85% EEN/EPN  Nutrition Goal Status: goal met  Communication of RD Recs:  (POC)    Reason for Assessment    Reason for Assessment: RD follow-up  Diagnosis: cardiac disease (s/p LVAD)  Relevant Medical History: CHF, CKD3, HTN  Interdisciplinary Rounds: attended    General Information Comments: Pt reports good appetite at this time. Reports eating ~75% of breakfast this am and dinner last night. Denies N/V/D/C. Provided coumadin education with emphasis on low Na, generally healthy diet for discharge. Pt voiced understanding. Will provide pt more extensive list of vit K foods per pt request.    Nutrition Discharge Planning: Adequate nutrition via PO intake.    Nutrition Risk Screen    Nutrition Risk Screen: no indicators present    Nutrition/Diet History    Patient Reported Diet/Restrictions/Preferences: low salt  Do you have any cultural, spiritual, Jainism conflicts, given your current situation?: None  Factors Affecting Nutritional Intake: None identified at this time    Anthropometrics    Temp: 98.9 °F (37.2 °C)  Height Method: Stated  Height: 6' 1" (185.4 cm)  Height (inches): 73 in  Weight Method: Standard Scale  Weight: 108.5 kg (239 lb 3.2 oz)  Weight (lb): 239.2 lb  Ideal Body Weight (IBW), Male: 184 lb  % Ideal Body Weight, Male (lb): 130 lb  BMI (Calculated): 31.6  BMI Grade: 35 - 39.9 - obesity - grade II  Usual Body Weight (UBW), k kg  % Usual Body Weight: 95.99    Lab/Procedures/Meds    Pertinent Labs Reviewed: reviewed  Pertinent Labs Comments: Na 130, BUN 39, Crt 2.2, GFR 38.7, Alb 2.8, PAB 19, T.Bili 1.5, CRP 82.9  Pertinent Medications Reviewed: reviewed  Pertinent " "Medications Comments: docusate, Fe, Mg, pantoprazole, polyethylene glycol, warfarin, dobutamine, lasix    Physical Findings/Assessment    Overall Physical Appearance: nourished  Tubes:  (none)  Oral/Mouth Cavity: WDL  Skin: edema, incision(s)    Estimated/Assessed Needs    Weight Used For Calorie Calculations: 108.5 kg (239 lb 3.2 oz)  Height: 6' 1" (185.4 cm)  Energy Calorie Requirements (kcal): 2393  Energy Need Method: [place]-St Jeor (x 1.2 (PAL))  RMR ([place]-St. Jeor Equation): 1993.88  Protein Requirements: 131-152 gm (1.2-1.4 gm/kg)  Weight Used For Protein Calculations: 108.5 kg (239 lb 3.2 oz)  Fluid Requirements (mL): per MD  RDA Method (mL): 2393     Nutrition Prescription Ordered    Current Diet Order: Cardiac  Nutrition Order Comments: 1500ml FR    Evaluation of Received Nutrient/Fluid Intake    I/O: -965ml x 24 hrs (-17.5L since admit)  Comments: LBM 3/19  % Intake of Estimated Energy Needs: 75 - 100 %  % Meal Intake: 75 - 100 %    Nutrition Risk    Level of Risk/Frequency of Follow-up: moderate (1x/week)     Assessment and Plan    * LVAD (left ventricular assist device) present    Contributing Nutrition Diagnosis  Increased nutrient needs    Related to (etiology):   Physiological causes    Signs and Symptoms (as evidenced by):   S/p LVAD    Nutrition Diagnosis Status:   Continues             Monitor and Evaluation    Food and Nutrient Intake: energy intake, food and beverage intake  Food and Nutrient Adminstration: diet order  Knowledge/Beliefs/Attitudes: food and nutrition knowledge/skill  Physical Activity and Function: nutrition-related ADLs and IADLs  Anthropometric Measurements: weight, weight change  Biochemical Data, Medical Tests and Procedures: electrolyte and renal panel, gastrointestinal profile, inflammatory profile  Nutrition-Focused Physical Findings: overall appearance     Nutrition Follow-Up    RD Follow-up?: Yes    "

## 2018-03-21 NOTE — PLAN OF CARE
Problem: Patient Care Overview  Goal: Plan of Care Review  Outcome: Ongoing (interventions implemented as appropriate)  Patient progressing toward all goals. Plan of care discussed with patient, no questions at this time. Patient ambulating independently, fall precautions in place. Heparin, Lasix, and Dobutamine d/c; Home supplies given;  VS & LVAD numbers WNL. Will monitor.

## 2018-03-22 LAB
ANION GAP SERPL CALC-SCNC: 10 MMOL/L
BASOPHILS # BLD AUTO: 0.03 K/UL
BASOPHILS NFR BLD: 0.3 %
BUN SERPL-MCNC: 35 MG/DL
CALCIUM SERPL-MCNC: 9.4 MG/DL
CHLORIDE SERPL-SCNC: 95 MMOL/L
CO2 SERPL-SCNC: 27 MMOL/L
CREAT SERPL-MCNC: 2 MG/DL
DIASTOLIC DYSFUNCTION: YES
DIFFERENTIAL METHOD: ABNORMAL
EOSINOPHIL # BLD AUTO: 0.1 K/UL
EOSINOPHIL NFR BLD: 1.1 %
ERYTHROCYTE [DISTWIDTH] IN BLOOD BY AUTOMATED COUNT: 16.3 %
EST. GFR  (AFRICAN AMERICAN): 43.4 ML/MIN/1.73 M^2
EST. GFR  (NON AFRICAN AMERICAN): 37.5 ML/MIN/1.73 M^2
GLUCOSE SERPL-MCNC: 96 MG/DL
HCT VFR BLD AUTO: 26.1 %
HGB BLD-MCNC: 8 G/DL
IMM GRANULOCYTES # BLD AUTO: 0.13 K/UL
IMM GRANULOCYTES NFR BLD AUTO: 1.2 %
INR PPP: 2
LDH SERPL L TO P-CCNC: 330 U/L
LYMPHOCYTES # BLD AUTO: 1.1 K/UL
LYMPHOCYTES NFR BLD: 9.9 %
MAGNESIUM SERPL-MCNC: 2.3 MG/DL
MCH RBC QN AUTO: 26.3 PG
MCHC RBC AUTO-ENTMCNC: 30.7 G/DL
MCV RBC AUTO: 86 FL
MITRAL VALVE REGURGITATION: ABNORMAL
MONOCYTES # BLD AUTO: 1.3 K/UL
MONOCYTES NFR BLD: 11.4 %
NEUTROPHILS # BLD AUTO: 8.5 K/UL
NEUTROPHILS NFR BLD: 76.1 %
NRBC BLD-RTO: 0 /100 WBC
PHOSPHATE SERPL-MCNC: 3.8 MG/DL
PLATELET # BLD AUTO: 405 K/UL
PMV BLD AUTO: 11.2 FL
POTASSIUM SERPL-SCNC: 3.8 MMOL/L
PROTHROMBIN TIME: 19.6 SEC
RBC # BLD AUTO: 3.04 M/UL
RETIRED EF AND QEF - SEE NOTES: 10 (ref 55–65)
SODIUM SERPL-SCNC: 132 MMOL/L
WBC # BLD AUTO: 11.11 K/UL

## 2018-03-22 PROCEDURE — 83735 ASSAY OF MAGNESIUM: CPT

## 2018-03-22 PROCEDURE — 25000003 PHARM REV CODE 250: Performed by: THORACIC SURGERY (CARDIOTHORACIC VASCULAR SURGERY)

## 2018-03-22 PROCEDURE — 83615 LACTATE (LD) (LDH) ENZYME: CPT

## 2018-03-22 PROCEDURE — 25000003 PHARM REV CODE 250: Performed by: NURSE PRACTITIONER

## 2018-03-22 PROCEDURE — 99232 SBSQ HOSP IP/OBS MODERATE 35: CPT | Mod: ,,, | Performed by: INTERNAL MEDICINE

## 2018-03-22 PROCEDURE — 84100 ASSAY OF PHOSPHORUS: CPT

## 2018-03-22 PROCEDURE — 85025 COMPLETE CBC W/AUTO DIFF WBC: CPT

## 2018-03-22 PROCEDURE — 97110 THERAPEUTIC EXERCISES: CPT

## 2018-03-22 PROCEDURE — 25000003 PHARM REV CODE 250: Performed by: PHYSICIAN ASSISTANT

## 2018-03-22 PROCEDURE — 25000003 PHARM REV CODE 250: Performed by: INTERNAL MEDICINE

## 2018-03-22 PROCEDURE — 27000248 HC VAD-ADDITIONAL DAY

## 2018-03-22 PROCEDURE — 63600175 PHARM REV CODE 636 W HCPCS: Performed by: PHYSICIAN ASSISTANT

## 2018-03-22 PROCEDURE — 80048 BASIC METABOLIC PNL TOTAL CA: CPT

## 2018-03-22 PROCEDURE — 85610 PROTHROMBIN TIME: CPT

## 2018-03-22 PROCEDURE — 25000003 PHARM REV CODE 250: Performed by: STUDENT IN AN ORGANIZED HEALTH CARE EDUCATION/TRAINING PROGRAM

## 2018-03-22 PROCEDURE — 20600001 HC STEP DOWN PRIVATE ROOM

## 2018-03-22 PROCEDURE — 93306 TTE W/DOPPLER COMPLETE: CPT

## 2018-03-22 PROCEDURE — 93306 TTE W/DOPPLER COMPLETE: CPT | Mod: 26,,, | Performed by: INTERNAL MEDICINE

## 2018-03-22 RX ORDER — ACETAMINOPHEN 325 MG/1
650 TABLET ORAL EVERY 6 HOURS PRN
Status: DISCONTINUED | OUTPATIENT
Start: 2018-03-22 | End: 2018-03-26 | Stop reason: HOSPADM

## 2018-03-22 RX ORDER — FUROSEMIDE 40 MG/1
40 TABLET ORAL 2 TIMES DAILY
Status: DISCONTINUED | OUTPATIENT
Start: 2018-03-22 | End: 2018-03-26 | Stop reason: HOSPADM

## 2018-03-22 RX ORDER — POTASSIUM CHLORIDE 20 MEQ/1
40 TABLET, EXTENDED RELEASE ORAL DAILY
Status: DISCONTINUED | OUTPATIENT
Start: 2018-03-22 | End: 2018-03-26 | Stop reason: HOSPADM

## 2018-03-22 RX ADMIN — MAGNESIUM OXIDE TAB 400 MG (241.3 MG ELEMENTAL MG) 400 MG: 400 (241.3 MG) TAB at 09:03

## 2018-03-22 RX ADMIN — FUROSEMIDE 80 MG: 10 INJECTION, SOLUTION INTRAMUSCULAR; INTRAVENOUS at 09:03

## 2018-03-22 RX ADMIN — OXYCODONE HYDROCHLORIDE 10 MG: 5 TABLET ORAL at 02:03

## 2018-03-22 RX ADMIN — MAGNESIUM OXIDE TAB 400 MG (241.3 MG ELEMENTAL MG) 400 MG: 400 (241.3 MG) TAB at 02:03

## 2018-03-22 RX ADMIN — ACETAMINOPHEN 650 MG: 325 TABLET ORAL at 09:03

## 2018-03-22 RX ADMIN — FERROUS GLUCONATE TAB 324 MG (37.5 MG ELEMENTAL IRON) 324 MG: 324 (37.5 FE) TAB at 09:03

## 2018-03-22 RX ADMIN — WARFARIN SODIUM 7.5 MG: 2.5 TABLET ORAL at 04:03

## 2018-03-22 RX ADMIN — DOCUSATE SODIUM 200 MG: 100 CAPSULE, LIQUID FILLED ORAL at 09:03

## 2018-03-22 RX ADMIN — AMIODARONE HYDROCHLORIDE 200 MG: 200 TABLET ORAL at 09:03

## 2018-03-22 RX ADMIN — PANTOPRAZOLE SODIUM 40 MG: 40 TABLET, DELAYED RELEASE ORAL at 09:03

## 2018-03-22 RX ADMIN — OXYCODONE HYDROCHLORIDE 10 MG: 5 TABLET ORAL at 09:03

## 2018-03-22 RX ADMIN — FUROSEMIDE 40 MG: 40 TABLET ORAL at 05:03

## 2018-03-22 RX ADMIN — Medication 81 MG: at 09:03

## 2018-03-22 RX ADMIN — POTASSIUM CHLORIDE 40 MEQ: 1500 TABLET, EXTENDED RELEASE ORAL at 02:03

## 2018-03-22 NOTE — SUBJECTIVE & OBJECTIVE
Interval History: Patient reports he feels great this AM, states he actually has more energy OFF . Denies chest pain, SOB, NVD.    Continuous Infusions:    Scheduled Meds:   amiodarone  200 mg Oral Daily    docusate sodium  200 mg Oral QHS    ferrous gluconate  324 mg Oral Daily with breakfast    furosemide  80 mg Intravenous BID    INV aspirin/placebo  81 mg Oral Daily    magnesium oxide  400 mg Oral TID    pantoprazole  40 mg Oral Daily    polyethylene glycol  17 g Oral Daily    warfarin  7.5 mg Oral Daily     PRN Meds:acetaminophen, albuterol sulfate, bisacodyl, dextrose 50%, dextrose 50%, glucagon (human recombinant), glucose, glucose, insulin aspart U-100, magnesium hydroxide 400 mg/5 ml, oxyCODONE, oxyCODONE    Review of patient's allergies indicates:   Allergen Reactions    Lisinopril Anaphylaxis     Objective:     Vital Signs (Most Recent):  Temp: 98.8 °F (37.1 °C) (03/22/18 1134)  Pulse: 89 (03/22/18 1134)  Resp: 18 (03/22/18 1134)  BP: 91/65 (03/22/18 1134)  SpO2: (!) 94 % (03/22/18 1134) Vital Signs (24h Range):  Temp:  [98.6 °F (37 °C)-99.1 °F (37.3 °C)] 98.8 °F (37.1 °C)  Pulse:  [77-94] 89  Resp:  [16-18] 18  SpO2:  [94 %-99 %] 94 %  BP: ()/(0-65) 91/65     Patient Vitals for the past 72 hrs (Last 3 readings):   Weight   03/22/18 0700 108.1 kg (238 lb 5.1 oz)   03/21/18 0700 108.5 kg (239 lb 3.2 oz)   03/20/18 0700 108 kg (238 lb 1.6 oz)     Body mass index is 31.44 kg/m².      Intake/Output Summary (Last 24 hours) at 03/22/18 1152  Last data filed at 03/22/18 0600   Gross per 24 hour   Intake              360 ml   Output             2350 ml   Net            -1990 ml     Hemodynamic Parameters:    Physical Exam   Constitutional: He appears well-developed and well-nourished. No distress.   HENT:   Head: Normocephalic and atraumatic.   Neck: Normal range of motion. JVD: barely above clavicle sitting up.   Cardiovascular: Normal rate.  Exam reveals no friction rub.    No murmur  heard.  Normal VAD hum   Pulmonary/Chest: Effort normal. No respiratory distress. He has no wheezes. He has no rales.   Abdominal: Soft. He exhibits no distension.   Musculoskeletal: Normal range of motion. He exhibits no edema.   Neurological: He is alert.   Skin: Skin is warm. Capillary refill takes 2 to 3 seconds. He is not diaphoretic. No erythema.     Significant Labs:  CBC:    Recent Labs  Lab 03/20/18  0617 03/21/18  0624 03/22/18  0625   WBC 14.32* 14.56* 11.11   RBC 2.98* 3.00* 3.04*   HGB 7.7* 7.7* 8.0*   HCT 24.8* 25.5* 26.1*    369* 405*   MCV 83 85 86   MCH 25.8* 25.7* 26.3*   MCHC 31.0* 30.2* 30.7*     BNP:    Recent Labs  Lab 03/16/18  0455 03/19/18  0633 03/21/18  0624   * 398* 565*     CMP:    Recent Labs  Lab 03/16/18  0455  03/19/18  0634  03/20/18  0617  03/20/18  2249 03/21/18  0624 03/22/18  0625   *  < > 101  --  95  --   --  103 96   CALCIUM 10.1  < > 10.0  --  9.8  --   --  9.8 9.4   ALBUMIN 2.7*  --  2.9*  --   --   --   --  2.8*  --    PROT 6.9  --  7.4  --   --   --   --  7.2  --    *  < > 133*  --  132*  --   --  130* 132*   K 3.8  3.8  < > 3.7  3.7  < > 3.8  3.8  < > 4.1 4.3  4.3 3.8   CO2 32*  < > 29  --  27  --   --  28 27   CL 90*  < > 92*  --  92*  --   --  92* 95   BUN 36*  < > 38*  --  37*  --   --  39* 35*   CREATININE 1.8*  < > 2.0*  --  1.8*  --   --  2.2* 2.0*   ALKPHOS 167*  --  176*  --   --   --   --  157*  --    ALT 40  --  57*  --   --   --   --  50*  --    AST 54*  --  63*  --   --   --   --  47*  --    BILITOT 2.9*  --  2.1*  --   --   --   --  1.5*  --    < > = values in this interval not displayed.   Coagulation:     Recent Labs  Lab 03/20/18  0617 03/20/18  1756 03/20/18 2249 03/21/18 0624 03/22/18 0625   INR 1.8*  --   --   --  2.0* 2.0*   APTT 44.5*  44.5*  < > 41.2* 43.1* 43.9*  43.9*  --    < > = values in this interval not displayed.  LDH:    Recent Labs  Lab 03/20/18 0617 03/21/18  0624 03/22/18  0625   * 340* 330*      Microbiology:  Microbiology Results (last 7 days)     Procedure Component Value Units Date/Time    Blood culture [[id number]] Collected:  03/15/18 1358    Order Status:  Completed Specimen:  Blood from Peripheral, Hand, Left Updated:  03/20/18 1812     Blood Culture, Routine No growth after 5 days.    Blood culture [[id number]] Collected:  03/15/18 1407    Order Status:  Completed Specimen:  Blood from Peripheral, Antecubital, Left Updated:  03/20/18 1812     Blood Culture, Routine No growth after 5 days.    Culture, Respiratory with Gram Stain [[id number]] Collected:  03/11/18 0800    Order Status:  Completed Specimen:  Respiratory from Endotracheal Aspirate Updated:  03/17/18 0931     Respiratory Culture No growth     Gram Stain (Respiratory) <10 epithelial cells per low power field.     Gram Stain (Respiratory) Few WBC's     Gram Stain (Respiratory) Rare Gram positive cocci     Gram Stain (Respiratory) Rare Gram positive rods    Urine culture [[id number]] Collected:  03/15/18 1431    Order Status:  Completed Specimen:  Urine from Urine, Clean Catch Updated:  03/16/18 2056     Urine Culture, Routine No growth    Blood culture [[id number]] Collected:  03/11/18 0650    Order Status:  Completed Specimen:  Blood from Peripheral, Hand, Right Updated:  03/16/18 1012     Blood Culture, Routine No growth after 5 days.    Blood culture [[id number]] Collected:  03/11/18 0651    Order Status:  Completed Specimen:  Blood from Peripheral, Wrist, Right Updated:  03/16/18 1012     Blood Culture, Routine No growth after 5 days.          I have reviewed all pertinent labs within the past 24 hours.    Estimated Creatinine Clearance: 56.4 mL/min (A) (based on SCr of 2 mg/dL (H)).    Diagnostic Results:  I have reviewed and interpreted all pertinent imaging results/findings within the past 24 hours.

## 2018-03-22 NOTE — ASSESSMENT & PLAN NOTE
-NICM   -Echo prior to VAD- EF: <10%; LVEDD: 9.7 cm  -now s/p HM II LVAD- most recent Echo 3/14/18- LVEDD 8.5.  -Echo 3/22 pending (off )  -Euvolemic on exam, will transition to PO lasix, maybe discharge tomorrow?

## 2018-03-22 NOTE — ASSESSMENT & PLAN NOTE
-creatinine stable and at baseline  -Baseline creat 1.6-2.0. (2 today)  -appears euvolemic, will monitor creatinine trends. Switching to PO lasix today and will monitor response

## 2018-03-22 NOTE — PROGRESS NOTES
[facility]  Heart Transplant  Progress Note    Patient Name: Tim Richards  MRN: [id number]  Admission Date: 3/1/2018  Hospital Length of Stay: 21 days  Attending Physician: Antonio Hadley Jr.,*  Primary Care Provider: Ja Méndez MD  Principal Problem:LVAD (left ventricular assist device) present    Subjective:     Interval History: Patient reports he feels great this AM, states he actually has more energy OFF . Denies chest pain, SOB, NVD.    Continuous Infusions:    Scheduled Meds:   amiodarone  200 mg Oral Daily    docusate sodium  200 mg Oral QHS    ferrous gluconate  324 mg Oral Daily with breakfast    furosemide  80 mg Intravenous BID    INV aspirin/placebo  81 mg Oral Daily    magnesium oxide  400 mg Oral TID    pantoprazole  40 mg Oral Daily    polyethylene glycol  17 g Oral Daily    warfarin  7.5 mg Oral Daily     PRN Meds:acetaminophen, albuterol sulfate, bisacodyl, dextrose 50%, dextrose 50%, glucagon (human recombinant), glucose, glucose, insulin aspart U-100, magnesium hydroxide 400 mg/5 ml, oxyCODONE, oxyCODONE    Review of patient's allergies indicates:   Allergen Reactions    Lisinopril Anaphylaxis     Objective:     Vital Signs (Most Recent):  Temp: 98.8 °F (37.1 °C) (03/22/18 1134)  Pulse: 89 (03/22/18 1134)  Resp: 18 (03/22/18 1134)  BP: 91/65 (03/22/18 1134)  SpO2: (!) 94 % (03/22/18 1134) Vital Signs (24h Range):  Temp:  [98.6 °F (37 °C)-99.1 °F (37.3 °C)] 98.8 °F (37.1 °C)  Pulse:  [77-94] 89  Resp:  [16-18] 18  SpO2:  [94 %-99 %] 94 %  BP: ()/(0-65) 91/65     Patient Vitals for the past 72 hrs (Last 3 readings):   Weight   03/22/18 0700 108.1 kg (238 lb 5.1 oz)   03/21/18 0700 108.5 kg (239 lb 3.2 oz)   03/20/18 0700 108 kg (238 lb 1.6 oz)     Body mass index is 31.44 kg/m².      Intake/Output Summary (Last 24 hours) at 03/22/18 1152  Last data filed at 03/22/18 0600   Gross per 24 hour   Intake              360 ml   Output              2350 ml   Net            -1990 ml     Hemodynamic Parameters:    Physical Exam   Constitutional: He appears well-developed and well-nourished. No distress.   HENT:   Head: Normocephalic and atraumatic.   Neck: Normal range of motion. JVD: barely above clavicle sitting up.   Cardiovascular: Normal rate.  Exam reveals no friction rub.    No murmur heard.  Normal VAD hum   Pulmonary/Chest: Effort normal. No respiratory distress. He has no wheezes. He has no rales.   Abdominal: Soft. He exhibits no distension.   Musculoskeletal: Normal range of motion. He exhibits no edema.   Neurological: He is alert.   Skin: Skin is warm. Capillary refill takes 2 to 3 seconds. He is not diaphoretic. No erythema.     Significant Labs:  CBC:    Recent Labs  Lab 03/20/18  0617 03/21/18  0624 03/22/18  0625   WBC 14.32* 14.56* 11.11   RBC 2.98* 3.00* 3.04*   HGB 7.7* 7.7* 8.0*   HCT 24.8* 25.5* 26.1*    369* 405*   MCV 83 85 86   MCH 25.8* 25.7* 26.3*   MCHC 31.0* 30.2* 30.7*     BNP:    Recent Labs  Lab 03/16/18  0455 03/19/18  0633 03/21/18  0624   * 398* 565*     CMP:    Recent Labs  Lab 03/16/18  0455  03/19/18  0634  03/20/18  0617  03/20/18  2249 03/21/18  0624 03/22/18  0625   *  < > 101  --  95  --   --  103 96   CALCIUM 10.1  < > 10.0  --  9.8  --   --  9.8 9.4   ALBUMIN 2.7*  --  2.9*  --   --   --   --  2.8*  --    PROT 6.9  --  7.4  --   --   --   --  7.2  --    *  < > 133*  --  132*  --   --  130* 132*   K 3.8  3.8  < > 3.7  3.7  < > 3.8  3.8  < > 4.1 4.3  4.3 3.8   CO2 32*  < > 29  --  27  --   --  28 27   CL 90*  < > 92*  --  92*  --   --  92* 95   BUN 36*  < > 38*  --  37*  --   --  39* 35*   CREATININE 1.8*  < > 2.0*  --  1.8*  --   --  2.2* 2.0*   ALKPHOS 167*  --  176*  --   --   --   --  157*  --    ALT 40  --  57*  --   --   --   --  50*  --    AST 54*  --  63*  --   --   --   --  47*  --    BILITOT 2.9*  --  2.1*  --   --   --   --  1.5*  --    < > = values in this interval not  displayed.   Coagulation:     Recent Labs  Lab 03/20/18  0617  03/20/18  1756 03/20/18  2249 03/21/18  0624 03/22/18  0625   INR 1.8*  --   --   --  2.0* 2.0*   APTT 44.5*  44.5*  < > 41.2* 43.1* 43.9*  43.9*  --    < > = values in this interval not displayed.  LDH:    Recent Labs  Lab 03/20/18  0617 03/21/18  0624 03/22/18  0625   * 340* 330*     Microbiology:  Microbiology Results (last 7 days)     Procedure Component Value Units Date/Time    Blood culture [[id number]] Collected:  03/15/18 1358    Order Status:  Completed Specimen:  Blood from Peripheral, Hand, Left Updated:  03/20/18 1812     Blood Culture, Routine No growth after 5 days.    Blood culture [[id number]] Collected:  03/15/18 1407    Order Status:  Completed Specimen:  Blood from Peripheral, Antecubital, Left Updated:  03/20/18 1812     Blood Culture, Routine No growth after 5 days.    Culture, Respiratory with Gram Stain [[id number]] Collected:  03/11/18 0800    Order Status:  Completed Specimen:  Respiratory from Endotracheal Aspirate Updated:  03/17/18 0931     Respiratory Culture No growth     Gram Stain (Respiratory) <10 epithelial cells per low power field.     Gram Stain (Respiratory) Few WBC's     Gram Stain (Respiratory) Rare Gram positive cocci     Gram Stain (Respiratory) Rare Gram positive rods    Urine culture [[id number]] Collected:  03/15/18 1431    Order Status:  Completed Specimen:  Urine from Urine, Clean Catch Updated:  03/16/18 2056     Urine Culture, Routine No growth    Blood culture [[id number]] Collected:  03/11/18 0650    Order Status:  Completed Specimen:  Blood from Peripheral, Hand, Right Updated:  03/16/18 1012     Blood Culture, Routine No growth after 5 days.    Blood culture [[id number]] Collected:  03/11/18 0651    Order Status:  Completed Specimen:  Blood from Peripheral, Wrist, Right Updated:  03/16/18 1012     Blood Culture, Routine No growth after 5 days.          I have reviewed all pertinent labs within the  past 24 hours.    Estimated Creatinine Clearance: 56.4 mL/min (A) (based on SCr of 2 mg/dL (H)).    Diagnostic Results:  I have reviewed and interpreted all pertinent imaging results/findings within the past 24 hours.    Assessment and Plan:     Mr. Richards is a 52 y.o. year old Black or  male who has presents as f/u from hospitalization for ADHF after presenting to clinic 1/10/17 as initial consult. advanced surgical options (LVAD/OHT).    This 51 yo BM has had CHF since 2011 at which time an angiogram did not demonstrate any CAD.  He is on amiodarone for VT. He was admitted from 1/12-1/17/18 (see d/c summary) where he was treated for ADHF and initiation of w/u for advanced options. RHC during that admission showed RA 17 and PCWP 35 as well as severely reduced CI 1.6. Though not optimized, he was discharged per his request so as not to lose his job/insurance--see Dr. Hadley's attestation for full details on d/c summary. Since d/c, his Scr has risen from 2.0 on discharge to 2.8 (1/23/18). His BNP had declined to 1040.  He presents today for scheduled hospital f/u.      Today, he reports feeling well. He says he has more energy than usual. His only complaints are cramping and xerosis. He denies n/v/d, no CP, palpitations or syncope. He has stable orthostatic dizziness/LH. No PND or orthopnea. His COLEMAN is improved from discharge and his weight is down about 5-7 pounds on his home scale. Of note, his Scr on labs from 2 days ago showed Scr 2.8 up from 2.0. T. Bili was down from 1.1 to 0.6 and BNP was down to 1040 from 1700.  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 No labs initially ordered but I ordered and sent him down.     * LVAD (left ventricular assist device) present    -HeartMate II Implanted 3/8/18 as DT with repositioning of outflow graft 3/9/18.  S/p chest closure 3/10/18  -HTS Primary as of today  -Implanted by Dr. Kaufman  -Anticoagulation per Primary  Coumadin  -Antiplatelets; Patient is enrolled in PREVENT II  -Speed set at 9000, LSL 8600 rpm  -Not listed for OHTx  - On ; repeat echo ordered for today.  off.  - Lasix now off: CVP was 6 yesterday via PICC line, will remove PICC line if RV looks OK on echo.          Pneumothorax    -IR unable to place chest tube or pig tail; Pneumothorax has resolved on CXR        Anemia    -Transfused a unit PRBC's 3/12  -H/H stable  -Iron studies reviewed, in EPIC        Hyperbilirubinemia    -likely secondary to above, trending down  -RUQ ultrasound neg        CKD (chronic kidney disease), stage III    -creatinine stable and at baseline  -Baseline creat 1.6-2.0. (2 today)  -appears euvolemic, will monitor creatinine trends. Switching to PO lasix today and will monitor response        PVT (paroxysmal ventricular tachycardia)    -has ICD  -Transitioned from IV Amiodarone infusion to po         Acute on chronic combined systolic and diastolic heart failure    -NICM   -Echo prior to VAD- EF: <10%; LVEDD: 9.7 cm  -now s/p HM II LVAD- most recent Echo 3/14/18- LVEDD 8.5.  -Echo 3/22 pending (off )  -Euvolemic on exam, will transition to PO lasix, maybe discharge tomorrow?             Biventricular implantable cardioverter-defibrillator in situ    - in place  - ICD interrogated, event noted on 2/25/18 with appropriate shock for VT  - Continue Amiodarone PO            Ivette Gustafson PA-C  Heart Transplant  [facility]

## 2018-03-22 NOTE — ASSESSMENT & PLAN NOTE
-HeartMate II Implanted 3/8/18 as DT with repositioning of outflow graft 3/9/18.  S/p chest closure 3/10/18  -HTS Primary as of today  -Implanted by Dr. Kaufman  -Anticoagulation per Primary Coumadin  -Antiplatelets; Patient is enrolled in PREVENT II  -Speed set at 9000, LSL 8600 rpm  -Not listed for OHTx  - On ; repeat echo ordered for today.  off.  - Lasix now off: CVP was 6 yesterday via PICC line, will remove PICC line if RV looks OK on echo.

## 2018-03-22 NOTE — PLAN OF CARE
Problem: Physical Therapy Goal  Goal: Physical Therapy Goal  Goals to be met by: 3/26     Patient will increase functional independence with mobility by performin. Supine to sit with Stand-by Assistance-not met  2. Sit to supine with Stand-by Assistance-not met  3. Sit to stand transfer with Stand-by Assistance - MET 3/16  4. Gait  x 100 feet with Stand-by Assistance - MET 3/17  5. Pt sit to/from stand with supervision- MET 3/20/2018  6. Pt receive gait training ~ 500 ft with supervision and no rest periods- not met   7. Pt will ascend/descend 4 steps w/ (L) rail for balance, CGA for safety- MET 3/20/2018   Outcome: Ongoing (interventions implemented as appropriate)  LTGs remain appropriate. Pt will continue PT POC.    Corrine Miller, PT  3/22/2018

## 2018-03-22 NOTE — PLAN OF CARE
Problem: Patient Care Overview  Goal: Plan of Care Review  Outcome: Ongoing (interventions implemented as appropriate)  Patient progressing toward all goals. Plan of care discussed with patient, no questions at this time. Patient ambulating independently, fall precautions in place. Echo complete; Pt changed ot oral Lasix; He is anticipating going home tomorrow; VS & LVAD numbers WNL. Will monitor.

## 2018-03-22 NOTE — PLAN OF CARE
Problem: Patient Care Overview  Goal: Plan of Care Review  Outcome: Ongoing (interventions implemented as appropriate)  Patient remains free from falls and injuries through out shift. Patient AAO and VSS. Patient denies chest pain and SOB. Patient LVAD hum is WNL. Transitioning to Lasix IVP. Patient's wife has been checked off on dressing changes and pt is checked off on alarms. Pt has supplies ready for home. Plan of care reviewed with patient. Patient verbalizes understanding of plan.  Will continue to monitor.

## 2018-03-22 NOTE — ASSESSMENT & PLAN NOTE
- in place  - ICD interrogated, event noted on 2/25/18 with appropriate shock for VT  - Continue Amiodarone PO

## 2018-03-22 NOTE — PROGRESS NOTES
Rounds with HTS today   VSS  JVP not elevated today   BNP up however off   Observe closely renal function and volume status off

## 2018-03-22 NOTE — PT/OT/SLP PROGRESS
Physical Therapy Treatment     Patient Name:  Tim Richards   MRN:  [id number]    Recommendations:     Discharge Recommendations:  home   Discharge Equipment Recommendations: none   Barriers to discharge: Inaccessible home (4 steps to enter home)    Assessment:     Tim Richards is a 52 y.o. male admitted with a medical diagnosis of LVAD (left ventricular assist device) present.  He presents with the following impairments/functional limitations:  impaired endurance, impaired cardiopulmonary response to activity . Pt meredith session well w/ good participation. He continues to be most limited by decreased endurance and requires seated rest breaks during amb trials. Pt will continue PT POC.    Rehab Prognosis:  Good; patient would benefit from acute skilled PT services to address these deficits and reach maximum level of function.      Recent Surgery: Procedure(s) (LRB):  CLOSURE-STERNAL WOUND (N/A)  PLACEMENT-NEOPERICARDIUM 12 Days Post-Op    Plan:     During this hospitalization, patient to be seen 3 x/week to address the above listed problems via gait training, therapeutic activities  · Plan of Care Expires:  04/11/18   Plan of Care Reviewed with: patient    Subjective     Communicated with nursing prior to session.  Patient found supine upon PT entry to room, agreeable to treatment.      Chief Complaint: weakness/fatigue  Patient comments/goals: to return home  Pain/Comfort:  · Pain Rating 1: 0/10    Patients cultural, spiritual, Jewish conflicts given the current situation: none reported    Objective:     Patient found with: telemetry, LVAD (LVAD to battery power in [facility])     General Precautions: Standard, fall, LVAD, sternal   Orthopedic Precautions:N/A   Braces: N/A     Functional Mobility:  · Bed Mobility:     · Supine to Sit: independence  · Sit to Supine: independence  · Transfers:     · Sit to Stand:  independence with no AD  · Gait:   · 450ft w/o AD w/ SPV for safety, 1 seated rest break required  due to fatigue/weakness  · Balance:   · Static stand w/o AD w/ SPV      AM-PAC 6 CLICK MOBILITY  Turning over in bed (including adjusting bedclothes, sheets and blankets)?: 4  Sitting down on and standing up from a chair with arms (e.g., wheelchair, bedside commode, etc.): 4  Moving from lying on back to sitting on the side of the bed?: 4  Moving to and from a bed to a chair (including a wheelchair)?: 4  Need to walk in hospital room?: 4  Climbing 3-5 steps with a railing?: 3  Total Score: 23       Therapeutic Activities and Exercises:   Pt educated on the importance of ambulating 3-4X/day in order to improve endurance and mobility. Pt verb understanding.    Patient left supine in bed with all lines intact and call button in reach..    GOALS:    Physical Therapy Goals        Problem: Physical Therapy Goal    Goal Priority Disciplines Outcome Goal Variances Interventions   Physical Therapy Goal     PT/OT, PT Ongoing (interventions implemented as appropriate)     Description:  Goals to be met by: 3/26     Patient will increase functional independence with mobility by performin. Supine to sit with Stand-by Assistance-MET 3/22/2018  2. Sit to supine with Stand-by Assistance- MET 3/22/2018  3. Sit to stand transfer with Stand-by Assistance - MET 3/16  4. Gait  x 100 feet with Stand-by Assistance - MET 3/17  5. Pt sit to/from stand with supervision- MET 3/20/2018  6. Pt receive gait training ~ 500 ft with supervision and no rest periods- not met   7. Pt will ascend/descend 4 steps w/ (L) rail for balance, CGA for safety- MET 3/20/2018                    Time Tracking:     PT Received On: 18  PT Start Time: 1359     PT Stop Time: 1411  PT Total Time (min): 12 min     Billable Minutes: Therapeutic Exercise 12 and Total Time 12    Treatment Type: Treatment  PT/PTA: PT     PTA Visit Number: 0     Corrine Miller, TERE  2018

## 2018-03-23 LAB
ALBUMIN SERPL BCP-MCNC: 2.9 G/DL
ALP SERPL-CCNC: 153 U/L
ALT SERPL W/O P-5'-P-CCNC: 46 U/L
ANION GAP SERPL CALC-SCNC: 8 MMOL/L
AST SERPL-CCNC: 44 U/L
BASOPHILS # BLD AUTO: 0.01 K/UL
BASOPHILS NFR BLD: 0.1 %
BILIRUB DIRECT SERPL-MCNC: 1 MG/DL
BILIRUB SERPL-MCNC: 1.3 MG/DL
BNP SERPL-MCNC: 744 PG/ML
BUN SERPL-MCNC: 33 MG/DL
CALCIUM SERPL-MCNC: 9.6 MG/DL
CHLORIDE SERPL-SCNC: 95 MMOL/L
CO2 SERPL-SCNC: 28 MMOL/L
CREAT SERPL-MCNC: 1.8 MG/DL
CRP SERPL-MCNC: 64.1 MG/L
DIFFERENTIAL METHOD: ABNORMAL
EOSINOPHIL # BLD AUTO: 0.1 K/UL
EOSINOPHIL NFR BLD: 1.1 %
ERYTHROCYTE [DISTWIDTH] IN BLOOD BY AUTOMATED COUNT: 16.2 %
EST. GFR  (AFRICAN AMERICAN): 49.3 ML/MIN/1.73 M^2
EST. GFR  (NON AFRICAN AMERICAN): 42.6 ML/MIN/1.73 M^2
GLUCOSE SERPL-MCNC: 109 MG/DL
HCT VFR BLD AUTO: 26.1 %
HGB BLD-MCNC: 8.1 G/DL
IMM GRANULOCYTES # BLD AUTO: 0.11 K/UL
IMM GRANULOCYTES NFR BLD AUTO: 1.1 %
INR PPP: 2.3
LDH SERPL L TO P-CCNC: 341 U/L
LYMPHOCYTES # BLD AUTO: 1 K/UL
LYMPHOCYTES NFR BLD: 10.4 %
MAGNESIUM SERPL-MCNC: 2.4 MG/DL
MCH RBC QN AUTO: 26.5 PG
MCHC RBC AUTO-ENTMCNC: 31 G/DL
MCV RBC AUTO: 85 FL
MONOCYTES # BLD AUTO: 0.9 K/UL
MONOCYTES NFR BLD: 9.3 %
NEUTROPHILS # BLD AUTO: 7.8 K/UL
NEUTROPHILS NFR BLD: 78 %
NRBC BLD-RTO: 0 /100 WBC
PHOSPHATE SERPL-MCNC: 3.3 MG/DL
PLATELET # BLD AUTO: 407 K/UL
PMV BLD AUTO: 9.7 FL
POTASSIUM SERPL-SCNC: 4 MMOL/L
PREALB SERPL-MCNC: 21 MG/DL
PROT SERPL-MCNC: 7.3 G/DL
PROTHROMBIN TIME: 22 SEC
RBC # BLD AUTO: 3.06 M/UL
SODIUM SERPL-SCNC: 131 MMOL/L
WBC # BLD AUTO: 10.03 K/UL

## 2018-03-23 PROCEDURE — 84100 ASSAY OF PHOSPHORUS: CPT

## 2018-03-23 PROCEDURE — 25000003 PHARM REV CODE 250: Performed by: NURSE PRACTITIONER

## 2018-03-23 PROCEDURE — 25000003 PHARM REV CODE 250: Performed by: INTERNAL MEDICINE

## 2018-03-23 PROCEDURE — 27000248 HC VAD-ADDITIONAL DAY

## 2018-03-23 PROCEDURE — 87086 URINE CULTURE/COLONY COUNT: CPT

## 2018-03-23 PROCEDURE — 83735 ASSAY OF MAGNESIUM: CPT

## 2018-03-23 PROCEDURE — 80076 HEPATIC FUNCTION PANEL: CPT

## 2018-03-23 PROCEDURE — 84134 ASSAY OF PREALBUMIN: CPT

## 2018-03-23 PROCEDURE — 83880 ASSAY OF NATRIURETIC PEPTIDE: CPT

## 2018-03-23 PROCEDURE — 20600001 HC STEP DOWN PRIVATE ROOM

## 2018-03-23 PROCEDURE — 99232 SBSQ HOSP IP/OBS MODERATE 35: CPT | Mod: ,,, | Performed by: INTERNAL MEDICINE

## 2018-03-23 PROCEDURE — 36415 COLL VENOUS BLD VENIPUNCTURE: CPT

## 2018-03-23 PROCEDURE — 85025 COMPLETE CBC W/AUTO DIFF WBC: CPT

## 2018-03-23 PROCEDURE — 83615 LACTATE (LD) (LDH) ENZYME: CPT

## 2018-03-23 PROCEDURE — 87040 BLOOD CULTURE FOR BACTERIA: CPT

## 2018-03-23 PROCEDURE — 86140 C-REACTIVE PROTEIN: CPT

## 2018-03-23 PROCEDURE — 85610 PROTHROMBIN TIME: CPT

## 2018-03-23 PROCEDURE — 25000003 PHARM REV CODE 250: Performed by: PHYSICIAN ASSISTANT

## 2018-03-23 PROCEDURE — 25000003 PHARM REV CODE 250: Performed by: THORACIC SURGERY (CARDIOTHORACIC VASCULAR SURGERY)

## 2018-03-23 PROCEDURE — 80048 BASIC METABOLIC PNL TOTAL CA: CPT

## 2018-03-23 RX ORDER — WARFARIN SODIUM 5 MG/1
5 TABLET ORAL ONCE
Status: COMPLETED | OUTPATIENT
Start: 2018-03-23 | End: 2018-03-23

## 2018-03-23 RX ORDER — WARFARIN SODIUM 5 MG/1
TABLET ORAL
Qty: 90 TABLET | Refills: 5 | Status: CANCELLED | OUTPATIENT
Start: 2018-03-23

## 2018-03-23 RX ORDER — WARFARIN 7.5 MG/1
7.5 TABLET ORAL DAILY
Status: DISCONTINUED | OUTPATIENT
Start: 2018-03-24 | End: 2018-03-26 | Stop reason: HOSPADM

## 2018-03-23 RX ADMIN — PANTOPRAZOLE SODIUM 40 MG: 40 TABLET, DELAYED RELEASE ORAL at 09:03

## 2018-03-23 RX ADMIN — DOCUSATE SODIUM 200 MG: 100 CAPSULE, LIQUID FILLED ORAL at 09:03

## 2018-03-23 RX ADMIN — FUROSEMIDE 40 MG: 40 TABLET ORAL at 09:03

## 2018-03-23 RX ADMIN — Medication 81 MG: at 09:03

## 2018-03-23 RX ADMIN — AMIODARONE HYDROCHLORIDE 200 MG: 200 TABLET ORAL at 09:03

## 2018-03-23 RX ADMIN — MAGNESIUM OXIDE TAB 400 MG (241.3 MG ELEMENTAL MG) 400 MG: 400 (241.3 MG) TAB at 09:03

## 2018-03-23 RX ADMIN — POTASSIUM CHLORIDE 40 MEQ: 1500 TABLET, EXTENDED RELEASE ORAL at 09:03

## 2018-03-23 RX ADMIN — OXYCODONE HYDROCHLORIDE 10 MG: 5 TABLET ORAL at 03:03

## 2018-03-23 RX ADMIN — MAGNESIUM OXIDE TAB 400 MG (241.3 MG ELEMENTAL MG) 400 MG: 400 (241.3 MG) TAB at 03:03

## 2018-03-23 RX ADMIN — WARFARIN SODIUM 5 MG: 5 TABLET ORAL at 05:03

## 2018-03-23 RX ADMIN — FERROUS GLUCONATE TAB 324 MG (37.5 MG ELEMENTAL IRON) 324 MG: 324 (37.5 FE) TAB at 09:03

## 2018-03-23 RX ADMIN — FUROSEMIDE 40 MG: 40 TABLET ORAL at 05:03

## 2018-03-23 NOTE — ASSESSMENT & PLAN NOTE
-creatinine stable and at baseline  -Baseline creat 1.6-2.0. (1.8 today)  -appears euvolemic, will monitor creatinine trends.

## 2018-03-23 NOTE — PROGRESS NOTES
Pt aaox3 with wife at bedside. Presented pt with first 3 follow-up appts in clinic. Pt and wife verbalized understanding of f/u appts.

## 2018-03-23 NOTE — PROGRESS NOTES
Seen pt in hospital. Conducted inventory of equipment, all equipment is accounted for. Pt and caregiver verbalized understanding of LVAD system, will follow up with pt in clinic.

## 2018-03-23 NOTE — SUBJECTIVE & OBJECTIVE
Interval History: Patient reports he is ready to go home.  Had a documented temperature of 100.4 last night.  Repeat temp 2 hours later 99. WBC is normal and no signs of infection.  He denies chills, nausea, dysuria or any other complaints.     Continuous Infusions:    Scheduled Meds:   amiodarone  200 mg Oral Daily    docusate sodium  200 mg Oral QHS    ferrous gluconate  324 mg Oral Daily with breakfast    furosemide  40 mg Oral BID    INV aspirin/placebo  81 mg Oral Daily    magnesium oxide  400 mg Oral TID    pantoprazole  40 mg Oral Daily    polyethylene glycol  17 g Oral Daily    potassium chloride  40 mEq Oral Daily    warfarin  5 mg Oral Once    [START ON 3/24/2018] warfarin  7.5 mg Oral Daily     PRN Meds:acetaminophen, albuterol sulfate, bisacodyl, dextrose 50%, dextrose 50%, glucagon (human recombinant), glucose, glucose, insulin aspart U-100, magnesium hydroxide 400 mg/5 ml, oxyCODONE, oxyCODONE    Review of patient's allergies indicates:   Allergen Reactions    Lisinopril Anaphylaxis     Objective:     Vital Signs (Most Recent):  Temp: 99.5 °F (37.5 °C) (03/23/18 1145)  Pulse: 84 (03/23/18 1145)  Resp: 16 (03/23/18 1145)  BP: (!) 80/0 (03/23/18 1145)  SpO2: 95 % (03/23/18 1145) Vital Signs (24h Range):  Temp:  [99.2 °F (37.3 °C)-100.4 °F (38 °C)] 99.5 °F (37.5 °C)  Pulse:  [74-91] 84  Resp:  [1-18] 16  SpO2:  [92 %-98 %] 95 %  BP: ()/(0-75) 80/0     Patient Vitals for the past 72 hrs (Last 3 readings):   Weight   03/22/18 0700 108.1 kg (238 lb 5.1 oz)   03/21/18 0700 108.5 kg (239 lb 3.2 oz)     Body mass index is 31.44 kg/m².      Intake/Output Summary (Last 24 hours) at 03/23/18 1158  Last data filed at 03/23/18 0800   Gross per 24 hour   Intake             1495 ml   Output             1550 ml   Net              -55 ml     Hemodynamic Parameters:    Physical Exam   Constitutional: He appears well-developed and well-nourished. No distress.   HENT:   Head: Normocephalic and atraumatic.    Neck: Normal range of motion. No JVD present.   Cardiovascular: Normal rate.  Exam reveals no friction rub.    No murmur heard.  Normal VAD hum   Pulmonary/Chest: Effort normal. No respiratory distress. He has no wheezes. He has no rales.   Abdominal: Soft. He exhibits no distension.   Musculoskeletal: Normal range of motion. He exhibits no edema.   Neurological: He is alert.   Skin: Skin is warm. Capillary refill takes 2 to 3 seconds. He is not diaphoretic. No erythema.     Significant Labs:  CBC:    Recent Labs  Lab 03/21/18  0624 03/22/18  0625 03/23/18  0828   WBC 14.56* 11.11 10.03   RBC 3.00* 3.04* 3.06*   HGB 7.7* 8.0* 8.1*   HCT 25.5* 26.1* 26.1*   * 405* 407*   MCV 85 86 85   MCH 25.7* 26.3* 26.5*   MCHC 30.2* 30.7* 31.0*     BNP:    Recent Labs  Lab 03/19/18  0633 03/21/18  0624 03/23/18  0633   * 565* 744*     CMP:    Recent Labs  Lab 03/19/18  0634  03/21/18  0624 03/22/18  0625 03/23/18  0633     < > 103 96 109   CALCIUM 10.0  < > 9.8 9.4 9.6   ALBUMIN 2.9*  --  2.8*  --  2.9*   PROT 7.4  --  7.2  --  7.3   *  < > 130* 132* 131*   K 3.7  3.7  < > 4.3  4.3 3.8 4.0   CO2 29  < > 28 27 28   CL 92*  < > 92* 95 95   BUN 38*  < > 39* 35* 33*   CREATININE 2.0*  < > 2.2* 2.0* 1.8*   ALKPHOS 176*  --  157*  --  153*   ALT 57*  --  50*  --  46*   AST 63*  --  47*  --  44*   BILITOT 2.1*  --  1.5*  --  1.3*   < > = values in this interval not displayed.   Coagulation:     Recent Labs  Lab 03/20/18  1756 03/20/18  2249 03/21/18 0624 03/22/18  0625 03/23/18  0633   INR  --   --  2.0* 2.0* 2.3*   APTT 41.2* 43.1* 43.9*  43.9*  --   --      LDH:    Recent Labs  Lab 03/21/18 0624 03/22/18  0625 03/23/18  0633   * 330* 341*     Microbiology:  Microbiology Results (last 7 days)     Procedure Component Value Units Date/Time    Blood culture [[id number]]     Order Status:  Canceled Specimen:  Blood     Blood culture [[id number]] Collected:  03/15/18 1358    Order Status:  Completed  Specimen:  Blood from Peripheral, Hand, Left Updated:  03/20/18 1812     Blood Culture, Routine No growth after 5 days.    Blood culture [[id number]] Collected:  03/15/18 1407    Order Status:  Completed Specimen:  Blood from Peripheral, Antecubital, Left Updated:  03/20/18 1812     Blood Culture, Routine No growth after 5 days.    Culture, Respiratory with Gram Stain [[id number]] Collected:  03/11/18 0800    Order Status:  Completed Specimen:  Respiratory from Endotracheal Aspirate Updated:  03/17/18 0931     Respiratory Culture No growth     Gram Stain (Respiratory) <10 epithelial cells per low power field.     Gram Stain (Respiratory) Few WBC's     Gram Stain (Respiratory) Rare Gram positive cocci     Gram Stain (Respiratory) Rare Gram positive rods    Urine culture [[id number]] Collected:  03/15/18 1431    Order Status:  Completed Specimen:  Urine from Urine, Clean Catch Updated:  03/16/18 2056     Urine Culture, Routine No growth          I have reviewed all pertinent labs within the past 24 hours.    Estimated Creatinine Clearance: 62.6 mL/min (A) (based on SCr of 1.8 mg/dL (H)).    Diagnostic Results:  I have reviewed and interpreted all pertinent imaging results/findings within the past 24 hours.

## 2018-03-23 NOTE — PROGRESS NOTES
[facility]  Heart Transplant  Progress Note    Patient Name: Tim Richards  MRN: [id number]  Admission Date: 3/1/2018  Hospital Length of Stay: 22 days  Attending Physician: Antonio Hadley Jr.,*  Primary Care Provider: Ja Méndez MD  Principal Problem:LVAD (left ventricular assist device) present    Subjective:     Interval History: Patient reports he is ready to go home.  Had a documented temperature of 100.4 last night.  Repeat temp 2 hours later 99. WBC is normal and no signs of infection.  He denies chills, nausea, dysuria or any other complaints.     Continuous Infusions:    Scheduled Meds:   amiodarone  200 mg Oral Daily    docusate sodium  200 mg Oral QHS    ferrous gluconate  324 mg Oral Daily with breakfast    furosemide  40 mg Oral BID    INV aspirin/placebo  81 mg Oral Daily    magnesium oxide  400 mg Oral TID    pantoprazole  40 mg Oral Daily    polyethylene glycol  17 g Oral Daily    potassium chloride  40 mEq Oral Daily    warfarin  5 mg Oral Once    [START ON 3/24/2018] warfarin  7.5 mg Oral Daily     PRN Meds:acetaminophen, albuterol sulfate, bisacodyl, dextrose 50%, dextrose 50%, glucagon (human recombinant), glucose, glucose, insulin aspart U-100, magnesium hydroxide 400 mg/5 ml, oxyCODONE, oxyCODONE    Review of patient's allergies indicates:   Allergen Reactions    Lisinopril Anaphylaxis     Objective:     Vital Signs (Most Recent):  Temp: 99.5 °F (37.5 °C) (03/23/18 1145)  Pulse: 84 (03/23/18 1145)  Resp: 16 (03/23/18 1145)  BP: (!) 80/0 (03/23/18 1145)  SpO2: 95 % (03/23/18 1145) Vital Signs (24h Range):  Temp:  [99.2 °F (37.3 °C)-100.4 °F (38 °C)] 99.5 °F (37.5 °C)  Pulse:  [74-91] 84  Resp:  [1-18] 16  SpO2:  [92 %-98 %] 95 %  BP: ()/(0-75) 80/0     Patient Vitals for the past 72 hrs (Last 3 readings):   Weight   03/22/18 0700 108.1 kg (238 lb 5.1 oz)   03/21/18 0700 108.5 kg (239 lb 3.2 oz)     Body mass index is 31.44  kg/m².      Intake/Output Summary (Last 24 hours) at 03/23/18 1158  Last data filed at 03/23/18 0800   Gross per 24 hour   Intake             1495 ml   Output             1550 ml   Net              -55 ml     Hemodynamic Parameters:    Physical Exam   Constitutional: He appears well-developed and well-nourished. No distress.   HENT:   Head: Normocephalic and atraumatic.   Neck: Normal range of motion. No JVD present.   Cardiovascular: Normal rate.  Exam reveals no friction rub.    No murmur heard.  Normal VAD hum   Pulmonary/Chest: Effort normal. No respiratory distress. He has no wheezes. He has no rales.   Abdominal: Soft. He exhibits no distension.   Musculoskeletal: Normal range of motion. He exhibits no edema.   Neurological: He is alert.   Skin: Skin is warm. Capillary refill takes 2 to 3 seconds. He is not diaphoretic. No erythema.     Significant Labs:  CBC:    Recent Labs  Lab 03/21/18  0624 03/22/18  0625 03/23/18  0828   WBC 14.56* 11.11 10.03   RBC 3.00* 3.04* 3.06*   HGB 7.7* 8.0* 8.1*   HCT 25.5* 26.1* 26.1*   * 405* 407*   MCV 85 86 85   MCH 25.7* 26.3* 26.5*   MCHC 30.2* 30.7* 31.0*     BNP:    Recent Labs  Lab 03/19/18  0633 03/21/18  0624 03/23/18  0633   * 565* 744*     CMP:    Recent Labs  Lab 03/19/18  0634  03/21/18  0624 03/22/18  0625 03/23/18  0633     < > 103 96 109   CALCIUM 10.0  < > 9.8 9.4 9.6   ALBUMIN 2.9*  --  2.8*  --  2.9*   PROT 7.4  --  7.2  --  7.3   *  < > 130* 132* 131*   K 3.7  3.7  < > 4.3  4.3 3.8 4.0   CO2 29  < > 28 27 28   CL 92*  < > 92* 95 95   BUN 38*  < > 39* 35* 33*   CREATININE 2.0*  < > 2.2* 2.0* 1.8*   ALKPHOS 176*  --  157*  --  153*   ALT 57*  --  50*  --  46*   AST 63*  --  47*  --  44*   BILITOT 2.1*  --  1.5*  --  1.3*   < > = values in this interval not displayed.   Coagulation:     Recent Labs  Lab 03/20/18  1756 03/20/18  2249 03/21/18  0624 03/22/18  0625 03/23/18  0633   INR  --   --  2.0* 2.0* 2.3*   APTT 41.2* 43.1* 43.9*   43.9*  --   --      LDH:    Recent Labs  Lab 03/21/18  0624 03/22/18  0625 03/23/18  0633   * 330* 341*     Microbiology:  Microbiology Results (last 7 days)     Procedure Component Value Units Date/Time    Blood culture [[id number]]     Order Status:  Canceled Specimen:  Blood     Blood culture [[id number]] Collected:  03/15/18 1358    Order Status:  Completed Specimen:  Blood from Peripheral, Hand, Left Updated:  03/20/18 1812     Blood Culture, Routine No growth after 5 days.    Blood culture [[id number]] Collected:  03/15/18 1407    Order Status:  Completed Specimen:  Blood from Peripheral, Antecubital, Left Updated:  03/20/18 1812     Blood Culture, Routine No growth after 5 days.    Culture, Respiratory with Gram Stain [[id number]] Collected:  03/11/18 0800    Order Status:  Completed Specimen:  Respiratory from Endotracheal Aspirate Updated:  03/17/18 0931     Respiratory Culture No growth     Gram Stain (Respiratory) <10 epithelial cells per low power field.     Gram Stain (Respiratory) Few WBC's     Gram Stain (Respiratory) Rare Gram positive cocci     Gram Stain (Respiratory) Rare Gram positive rods    Urine culture [[id number]] Collected:  03/15/18 1431    Order Status:  Completed Specimen:  Urine from Urine, Clean Catch Updated:  03/16/18 2056     Urine Culture, Routine No growth          I have reviewed all pertinent labs within the past 24 hours.    Estimated Creatinine Clearance: 62.6 mL/min (A) (based on SCr of 1.8 mg/dL (H)).    Diagnostic Results:  I have reviewed and interpreted all pertinent imaging results/findings within the past 24 hours.    Assessment and Plan:     Mr. Richards is a 52 y.o. year old Black or  male who has presents as f/u from hospitalization for ADHF after presenting to clinic 1/10/17 as initial consult. advanced surgical options (LVAD/OHT).    This 51 yo BM has had CHF since 2011 at which time an angiogram did not demonstrate any CAD.  He is on amiodarone  for VT. He was admitted from 1/12-1/17/18 (see d/c summary) where he was treated for ADHF and initiation of w/u for advanced options. RHC during that admission showed RA 17 and PCWP 35 as well as severely reduced CI 1.6. Though not optimized, he was discharged per his request so as not to lose his job/insurance--see Dr. Hadley's attestation for full details on d/c summary. Since d/c, his Scr has risen from 2.0 on discharge to 2.8 (1/23/18). His BNP had declined to 1040.  He presents today for scheduled hospital f/u.      Today, he reports feeling well. He says he has more energy than usual. His only complaints are cramping and xerosis. He denies n/v/d, no CP, palpitations or syncope. He has stable orthostatic dizziness/LH. No PND or orthopnea. His COLEMAN is improved from discharge and his weight is down about 5-7 pounds on his home scale. Of note, his Scr on labs from 2 days ago showed Scr 2.8 up from 2.0. T. Bili was down from 1.1 to 0.6 and BNP was down to 1040 from 1700.  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 No labs initially ordered but I ordered and sent him down.     * LVAD (left ventricular assist device) present    -HeartMate II Implanted 3/8/18 as DT with repositioning of outflow graft 3/9/18.  S/p chest closure 3/10/18  -HTS Primary as of today  -Implanted by Dr. Kaufman  -Anticoagulation per Primary Coumadin  -Antiplatelets; Patient is enrolled in PREVENT II  -Speed set at 9000, LSL 8600 rpm  -Not listed for OHTx  - Weaned off Dobutamine and patient looks clinically compensated   -Patient doing well on Lasix po BID         Acute on chronic combined systolic and diastolic heart failure    -NICM   -Echo prior to VAD- EF: <10%; LVEDD: 9.7 cm  -now s/p HM II LVAD- most recent Echo 3/14/18- LVEDD 8.5.  -Echo stable off Dobutamine   -Euvolemic on exam, will continue po Lasix at current dose BID              CKD (chronic kidney disease), stage III    -creatinine stable and at  baseline  -Baseline creat 1.6-2.0. (1.8 today)  -appears euvolemic, will monitor creatinine trends.         PVT (paroxysmal ventricular tachycardia)    -has ICD  -Transitioned from IV Amiodarone infusion to po         Hyperbilirubinemia    -likely secondary to above, trending down  -RUQ ultrasound neg        Biventricular implantable cardioverter-defibrillator in situ    - in place  - ICD interrogated, event noted on 2/25/18 with appropriate shock for VT  - Continue Amiodarone PO        Anemia    -Transfused a unit PRBC's 3/12  -H/H stable  -Iron studies reviewed, in EPIC        Pneumothorax    -IR unable to place chest tube or pig tail; Pneumothorax has resolved on CXR            YANET Coleman  Heart Transplant  [facility]

## 2018-03-23 NOTE — ASSESSMENT & PLAN NOTE
-HeartMate II Implanted 3/8/18 as DT with repositioning of outflow graft 3/9/18.  S/p chest closure 3/10/18  -HTS Primary as of today  -Implanted by Dr. Kaufman  -Anticoagulation per Primary Coumadin  -Antiplatelets; Patient is enrolled in PREVENT II  -Speed set at 9000, LSL 8600 rpm  -Not listed for OHTx  - Weaned off Dobutamine and patient looks clinically compensated   -Patient doing well on Lasix po BID

## 2018-03-23 NOTE — PLAN OF CARE
Problem: Patient Care Overview  Goal: Individualization & Mutuality          Plan of care discussed with patient.  Patient ambulating, fall precautions in place.Continuing to encourage sternal precautions and ambulation. LVAD DP and numbers WNL, smooth LVAD hum. Patient has no complaints of pain. Discussed medications and care. Encouraged workbook, reviewed education, filled in binder. VSS. AAOx4. Patient has no questions at this time. Will continue to monitor.

## 2018-03-23 NOTE — PROGRESS NOTES
"Patient had a 7 beat run of V Tach at 0439.  On assessment patient is alert with no complaints of palpations. Patient doppler 86/0. Pedro CHAMBERLAIN notified orders to draw labs. Patient refused labs stating "arrhythmia caused by telemetry  battery change."    "

## 2018-03-23 NOTE — PT/OT/SLP PROGRESS
Occupational Therapy      Patient Name:  Tim Richards   MRN:  [id number]    Attempted to see patient for OT treatment, however pt politely refusing engagement in therapy at this time. OT educated patient on importance of continued OOB Mobility to prevent blood clots and maintain strength with pt verbalizing understanding, however still refusing engagement in therapy. Will follow tomorrow if patient still inpatient.     Codi Perez, OT  3/23/2018

## 2018-03-23 NOTE — PROGRESS NOTES
"DISCHARGE    Pt presents in room alone, aaox4, seated on couch. Pt reports feels "very " about going home today with [facility]  ph [phone]. Fax [phone]. Pt's wife will transport. Pt reports coping well and denies further needs, questions, concerns at this time and none indicated. Providing psychosocial and counseling support, education, resources, assistance and discharge planning as indicated. SW remains available.    "

## 2018-03-23 NOTE — ASSESSMENT & PLAN NOTE
-NICM   -Echo prior to VAD- EF: <10%; LVEDD: 9.7 cm  -now s/p HM II LVAD- most recent Echo 3/14/18- LVEDD 8.5.  -Echo stable off Dobutamine   -Euvolemic on exam, will continue po Lasix at current dose BID

## 2018-03-23 NOTE — PLAN OF CARE
Problem: Patient Care Overview  Goal: Plan of Care Review  Outcome: Ongoing (interventions implemented as appropriate)  Patient remains free from falls and injuries through out shift. Patient AAO and VSS. Patient denies chest pain and SOB.  VAD functioning WNL. Transition to Lasix 40mg PO BID. Administered Tylenol PO to patient for temp of 100.4F Notified Pedro CHAMBERLAIN about patient temp. Blood cultures times 1 were ordered. Patient refused blood cultures(see note). Temp 99.3F on reassessment at 2232. Pt planned discharge on 3/23/2018. Plan of care reviewed with patient. Patient verbalizes understanding of plan.  Will continue to monitor.

## 2018-03-23 NOTE — PROGRESS NOTES
Patient temp 100.4; temp steadily increasing throughout day. Pedro CHAMBERLAIN notified orders for blood cultures and tylenol 650mg PO prn q6hr. Patient took tylenol and refused blood cultures. Pedro CHAMBERLAIN notified of refusal. Will continue to monitor.

## 2018-03-23 NOTE — PROGRESS NOTES
Notified YANET Rosario that lab called with H/H 5.7/18.9, lab stated that they weren't sure if maybe it was a bad sample or not. YANET Rosario ordered a STAT CBC redraw. Pt reports no blood loss in stool or urine, VSS and pt denies any weakness or dizziness. Will continue to monitor pt.

## 2018-03-24 LAB
ANION GAP SERPL CALC-SCNC: 9 MMOL/L
BASOPHILS # BLD AUTO: 0.02 K/UL
BASOPHILS NFR BLD: 0.2 %
BUN SERPL-MCNC: 26 MG/DL
CALCIUM SERPL-MCNC: 9.1 MG/DL
CHLORIDE SERPL-SCNC: 98 MMOL/L
CO2 SERPL-SCNC: 26 MMOL/L
CREAT SERPL-MCNC: 1.7 MG/DL
DIFFERENTIAL METHOD: ABNORMAL
EOSINOPHIL # BLD AUTO: 0.1 K/UL
EOSINOPHIL NFR BLD: 0.8 %
ERYTHROCYTE [DISTWIDTH] IN BLOOD BY AUTOMATED COUNT: 16.2 %
EST. GFR  (AFRICAN AMERICAN): 52.8 ML/MIN/1.73 M^2
EST. GFR  (NON AFRICAN AMERICAN): 45.7 ML/MIN/1.73 M^2
GLUCOSE SERPL-MCNC: 84 MG/DL
HCT VFR BLD AUTO: 25.4 %
HGB BLD-MCNC: 7.6 G/DL
IMM GRANULOCYTES # BLD AUTO: 0.08 K/UL
IMM GRANULOCYTES NFR BLD AUTO: 0.7 %
INR PPP: 2.6
LDH SERPL L TO P-CCNC: 316 U/L
LYMPHOCYTES # BLD AUTO: 1.2 K/UL
LYMPHOCYTES NFR BLD: 10.9 %
MAGNESIUM SERPL-MCNC: 2 MG/DL
MCH RBC QN AUTO: 25.4 PG
MCHC RBC AUTO-ENTMCNC: 29.9 G/DL
MCV RBC AUTO: 85 FL
MONOCYTES # BLD AUTO: 1.1 K/UL
MONOCYTES NFR BLD: 10.3 %
NEUTROPHILS # BLD AUTO: 8.5 K/UL
NEUTROPHILS NFR BLD: 77.1 %
NRBC BLD-RTO: 0 /100 WBC
PHOSPHATE SERPL-MCNC: 3.3 MG/DL
PLATELET # BLD AUTO: 464 K/UL
PMV BLD AUTO: 10.1 FL
POTASSIUM SERPL-SCNC: 4.3 MMOL/L
PROTHROMBIN TIME: 24.7 SEC
RBC # BLD AUTO: 2.99 M/UL
SODIUM SERPL-SCNC: 133 MMOL/L
WBC # BLD AUTO: 11.02 K/UL

## 2018-03-24 PROCEDURE — 25000003 PHARM REV CODE 250: Performed by: INTERNAL MEDICINE

## 2018-03-24 PROCEDURE — 83735 ASSAY OF MAGNESIUM: CPT

## 2018-03-24 PROCEDURE — 25000003 PHARM REV CODE 250: Performed by: PHYSICIAN ASSISTANT

## 2018-03-24 PROCEDURE — 84100 ASSAY OF PHOSPHORUS: CPT

## 2018-03-24 PROCEDURE — 27000248 HC VAD-ADDITIONAL DAY

## 2018-03-24 PROCEDURE — 85025 COMPLETE CBC W/AUTO DIFF WBC: CPT

## 2018-03-24 PROCEDURE — 85610 PROTHROMBIN TIME: CPT

## 2018-03-24 PROCEDURE — 25000003 PHARM REV CODE 250: Performed by: THORACIC SURGERY (CARDIOTHORACIC VASCULAR SURGERY)

## 2018-03-24 PROCEDURE — 87070 CULTURE OTHR SPECIMN AEROBIC: CPT

## 2018-03-24 PROCEDURE — 25000003 PHARM REV CODE 250: Performed by: NURSE PRACTITIONER

## 2018-03-24 PROCEDURE — 83615 LACTATE (LD) (LDH) ENZYME: CPT

## 2018-03-24 PROCEDURE — 80048 BASIC METABOLIC PNL TOTAL CA: CPT

## 2018-03-24 PROCEDURE — 99232 SBSQ HOSP IP/OBS MODERATE 35: CPT | Mod: ,,, | Performed by: INTERNAL MEDICINE

## 2018-03-24 PROCEDURE — 20600001 HC STEP DOWN PRIVATE ROOM

## 2018-03-24 RX ORDER — AMLODIPINE BESYLATE 5 MG/1
5 TABLET ORAL DAILY
Status: DISCONTINUED | OUTPATIENT
Start: 2018-03-24 | End: 2018-03-25

## 2018-03-24 RX ORDER — HYDRALAZINE HYDROCHLORIDE 10 MG/1
10 TABLET, FILM COATED ORAL ONCE
Status: DISCONTINUED | OUTPATIENT
Start: 2018-03-24 | End: 2018-03-26 | Stop reason: HOSPADM

## 2018-03-24 RX ADMIN — Medication 81 MG: at 10:03

## 2018-03-24 RX ADMIN — FUROSEMIDE 40 MG: 40 TABLET ORAL at 05:03

## 2018-03-24 RX ADMIN — FERROUS GLUCONATE TAB 324 MG (37.5 MG ELEMENTAL IRON) 324 MG: 324 (37.5 FE) TAB at 10:03

## 2018-03-24 RX ADMIN — DOCUSATE SODIUM 200 MG: 100 CAPSULE, LIQUID FILLED ORAL at 09:03

## 2018-03-24 RX ADMIN — FUROSEMIDE 40 MG: 40 TABLET ORAL at 10:03

## 2018-03-24 RX ADMIN — AMIODARONE HYDROCHLORIDE 200 MG: 200 TABLET ORAL at 10:03

## 2018-03-24 RX ADMIN — OXYCODONE HYDROCHLORIDE 10 MG: 5 TABLET ORAL at 02:03

## 2018-03-24 RX ADMIN — OXYCODONE HYDROCHLORIDE 10 MG: 5 TABLET ORAL at 09:03

## 2018-03-24 RX ADMIN — MAGNESIUM OXIDE TAB 400 MG (241.3 MG ELEMENTAL MG) 400 MG: 400 (241.3 MG) TAB at 02:03

## 2018-03-24 RX ADMIN — MAGNESIUM OXIDE TAB 400 MG (241.3 MG ELEMENTAL MG) 400 MG: 400 (241.3 MG) TAB at 10:03

## 2018-03-24 RX ADMIN — WARFARIN SODIUM 7.5 MG: 7.5 TABLET ORAL at 05:03

## 2018-03-24 RX ADMIN — MAGNESIUM OXIDE TAB 400 MG (241.3 MG ELEMENTAL MG) 400 MG: 400 (241.3 MG) TAB at 09:03

## 2018-03-24 RX ADMIN — PANTOPRAZOLE SODIUM 40 MG: 40 TABLET, DELAYED RELEASE ORAL at 10:03

## 2018-03-24 RX ADMIN — AMLODIPINE BESYLATE 5 MG: 5 TABLET ORAL at 10:03

## 2018-03-24 NOTE — PROGRESS NOTES
[facility]  Heart Transplant  Progress Note    Patient Name: Tim Richards  MRN: [id number]  Admission Date: 3/1/2018  Hospital Length of Stay: 23 days  Attending Physician: Antonio Hadley Jr.,*  Primary Care Provider: Ja Méndez MD  Principal Problem:LVAD (left ventricular assist device) present    Subjective:     Interval History: Patient had some serosanguinous drainage from bottom of sternal incicion.  It was sent for culture.  Blood and urine cultures done yesterday and negative thus far.  He has been afebrile over the last 24 hours.  Dopplers are elevated.     Continuous Infusions:    Scheduled Meds:   amiodarone  200 mg Oral Daily    amLODIPine  5 mg Oral Daily    docusate sodium  200 mg Oral QHS    ferrous gluconate  324 mg Oral Daily with breakfast    furosemide  40 mg Oral BID    INV aspirin/placebo  81 mg Oral Daily    magnesium oxide  400 mg Oral TID    pantoprazole  40 mg Oral Daily    polyethylene glycol  17 g Oral Daily    potassium chloride  40 mEq Oral Daily    warfarin  7.5 mg Oral Daily     PRN Meds:acetaminophen, albuterol sulfate, bisacodyl, dextrose 50%, dextrose 50%, glucagon (human recombinant), glucose, glucose, insulin aspart U-100, magnesium hydroxide 400 mg/5 ml, oxyCODONE, oxyCODONE    Review of patient's allergies indicates:   Allergen Reactions    Lisinopril Anaphylaxis     Objective:     Vital Signs (Most Recent):  Temp: 98.4 °F (36.9 °C) (03/24/18 0815)  Pulse: 93 (03/24/18 0819)  Resp: 16 (03/24/18 0815)  BP: (!) 86/0 (03/24/18 0815)  SpO2: 98 % (03/24/18 0815) Vital Signs (24h Range):  Temp:  [98.3 °F (36.8 °C)-99.5 °F (37.5 °C)] 98.4 °F (36.9 °C)  Pulse:  [73-93] 93  Resp:  [16-18] 16  SpO2:  [94 %-99 %] 98 %  BP: ()/(0-74) 86/0     Patient Vitals for the past 72 hrs (Last 3 readings):   Weight   03/24/18 0700 109.9 kg (242 lb 4.6 oz)   03/22/18 0700 108.1 kg (238 lb 5.1 oz)     Body mass index is 31.97  kg/m².      Intake/Output Summary (Last 24 hours) at 03/24/18 1038  Last data filed at 03/24/18 0100   Gross per 24 hour   Intake             1020 ml   Output                0 ml   Net             1020 ml     Hemodynamic Parameters:    Physical Exam   Constitutional: He appears well-developed and well-nourished. No distress.   HENT:   Head: Normocephalic and atraumatic.   Neck: Normal range of motion. No JVD present.   Cardiovascular: Normal rate.  Exam reveals no friction rub.    No murmur heard.  Normal VAD hum   Pulmonary/Chest: Effort normal. No respiratory distress. He has no wheezes. He has no rales.   Abdominal: Soft. He exhibits no distension.   Musculoskeletal: Normal range of motion. He exhibits no edema.   Neurological: He is alert.   Skin: Skin is warm. Capillary refill takes 2 to 3 seconds. He is not diaphoretic. No erythema.     Significant Labs:  CBC:    Recent Labs  Lab 03/22/18  0625 03/23/18  0828 03/24/18  0701   WBC 11.11 10.03 11.02   RBC 3.04* 3.06* 2.99*   HGB 8.0* 8.1* 7.6*   HCT 26.1* 26.1* 25.4*   * 407* 464*   MCV 86 85 85   MCH 26.3* 26.5* 25.4*   MCHC 30.7* 31.0* 29.9*     BNP:    Recent Labs  Lab 03/19/18  0633 03/21/18  0624 03/23/18  0633   * 565* 744*     CMP:    Recent Labs  Lab 03/19/18  0634  03/21/18  0624 03/22/18  0625 03/23/18  0633 03/24/18  0701     < > 103 96 109 84   CALCIUM 10.0  < > 9.8 9.4 9.6 9.1   ALBUMIN 2.9*  --  2.8*  --  2.9*  --    PROT 7.4  --  7.2  --  7.3  --    *  < > 130* 132* 131* 133*   K 3.7  3.7  < > 4.3  4.3 3.8 4.0 4.3   CO2 29  < > 28 27 28 26   CL 92*  < > 92* 95 95 98   BUN 38*  < > 39* 35* 33* 26*   CREATININE 2.0*  < > 2.2* 2.0* 1.8* 1.7*   ALKPHOS 176*  --  157*  --  153*  --    ALT 57*  --  50*  --  46*  --    AST 63*  --  47*  --  44*  --    BILITOT 2.1*  --  1.5*  --  1.3*  --    < > = values in this interval not displayed.   Coagulation:     Recent Labs  Lab 03/20/18  1756 03/20/18  2249 03/21/18  0624  03/22/18  0625 03/23/18  0633 03/24/18  0701   INR  --   --  2.0* 2.0* 2.3* 2.6*   APTT 41.2* 43.1* 43.9*  43.9*  --   --   --      LDH:    Recent Labs  Lab 03/22/18  0625 03/23/18  0633 03/24/18  0701   * 341* 316*     Microbiology:  Microbiology Results (last 7 days)     Procedure Component Value Units Date/Time    Blood culture [[id number]] Collected:  03/23/18 2220    Order Status:  Completed Specimen:  Blood Updated:  03/24/18 0515     Blood Culture, Routine No Growth to date    Narrative:       Collection has been rescheduled by CAB2 at 3/23/2018 21:47 Reason:   Nurse Shasha is going rescheduled lab for  am collected  Collection has been rescheduled by CAB2 at 3/23/2018 21:47 Reason:   Nurse Shasha is going rescheduled lab for  am collected    Blood culture [[id number]] Collected:  03/23/18 2220    Order Status:  Completed Specimen:  Blood Updated:  03/24/18 0515     Blood Culture, Routine No Growth to date    Narrative:       Collection has been rescheduled by CAB2 at 3/23/2018 21:47 Reason:   Nurse Shasha is going rescheduled lab for  am collected  Collection has been rescheduled by CAB2 at 3/23/2018 21:47 Reason:   Nurse Shasha is going rescheduled lab for  am collected    Aerobic culture [[id number]] Collected:  03/24/18 0226    Order Status:  No result Specimen:  Chest Updated:  03/24/18 0330    Urine culture [[id number]] Collected:  03/23/18 2233    Order Status:  Sent Specimen:  Urine from Urine, Clean Catch Updated:  03/23/18 2332    Blood culture [[id number]]     Order Status:  Canceled Specimen:  Blood     Blood culture [[id number]]     Order Status:  Canceled Specimen:  Blood     Blood culture [[id number]]     Order Status:  Canceled Specimen:  Blood     Blood culture [[id number]] Collected:  03/15/18 1358    Order Status:  Completed Specimen:  Blood from Peripheral, Hand, Left Updated:  03/20/18 1812     Blood Culture, Routine No growth after 5 days.    Blood culture [[id number]] Collected:   03/15/18 1407    Order Status:  Completed Specimen:  Blood from Peripheral, Antecubital, Left Updated:  03/20/18 1812     Blood Culture, Routine No growth after 5 days.          I have reviewed all pertinent labs within the past 24 hours.    Estimated Creatinine Clearance: 66.8 mL/min (A) (based on SCr of 1.7 mg/dL (H)).    Diagnostic Results:  I have reviewed and interpreted all pertinent imaging results/findings within the past 24 hours.    Assessment and Plan:       * LVAD (left ventricular assist device) present    -HeartMate II Implanted 3/8/18 as DT with repositioning of outflow graft 3/9/18.  S/p chest closure 3/10/18  -HTS Primary as of today  -Implanted by Dr. Kaufman  -Anticoagulation per Primary Coumadin  -Antiplatelets; Patient is enrolled in PREVENT II  -Speed set at 9000, LSL 8600 rpm  -Not listed for OHTx  - Weaned off Dobutamine and patient looks clinically compensated   -Patient doing well on Lasix po BID         Acute on chronic combined systolic and diastolic heart failure    -NICM   -Echo prior to VAD- EF: <10%; LVEDD: 9.7 cm  -now s/p HM II LVAD- most recent Echo 3/14/18- LVEDD 8.5.  -Echo stable off Dobutamine   -Euvolemic on exam, will continue po Lasix at current dose BID              CKD (chronic kidney disease), stage III    -creatinine stable and at baseline  -Baseline creat 1.6-2.0. (1.8 today)  -appears euvolemic, will monitor creatinine trends.         PVT (paroxysmal ventricular tachycardia)    -has ICD  -Transitioned from IV Amiodarone infusion to po         Hyperbilirubinemia    -likely secondary to above, trending down  -RUQ ultrasound neg        Biventricular implantable cardioverter-defibrillator in situ    - in place  - ICD interrogated, event noted on 2/25/18 with appropriate shock for VT  - Continue Amiodarone PO        Anemia    -Transfused a unit PRBC's 3/12  -H/H stable  -Iron studies reviewed, in EPIC        Pneumothorax    -IR unable to place chest tube or pig tail;  Pneumothorax has resolved on CXR            YANET Coleman  Heart Transplant  [facility]

## 2018-03-24 NOTE — PLAN OF CARE
Problem: Patient Care Overview  Goal: Individualization & Mutuality  PMHx:CHF, DCM, CKD, gout, HTN, V. Tach, AICD, colonoscopy   1/12-1/17: txtmt for ADHF. RHC      3/1: Admitted for LVAD placement   3/8: LVAD placement  3/9: washout chest closure  3/12: 1 u PRBC  314: abdominal ultrasound, echo, oob to chair    APTT goal 40-50        Plan of care discussed with patient.  Patient ambulating independently, fall precautions in place.Continuing to encourage sternal precautions, IS, and ambulation. LVAD DP and numbers WNL, smooth LVAD hum. Patient has no complaints of pain. Discussed medications and care. VSS. AAOx4. Encouraged workbook, reviewed education, filled in binder. Patient has no questions at this time. Will continue to monitor.

## 2018-03-24 NOTE — PROGRESS NOTES
Notified YANET Rosario that K=4.3 this AM. Pt has scheduled 40mEq K. PA ordered to hold this AM's dose. Will continue to monitor pt.

## 2018-03-24 NOTE — SUBJECTIVE & OBJECTIVE
Interval History: Patient had some serosanguinous drainage from bottom of sternal incicion.  It was sent for culture.  Blood and urine cultures done yesterday and negative thus far.  He has been afebrile over the last 24 hours.  Dopplers are elevated.     Continuous Infusions:    Scheduled Meds:   amiodarone  200 mg Oral Daily    amLODIPine  5 mg Oral Daily    docusate sodium  200 mg Oral QHS    ferrous gluconate  324 mg Oral Daily with breakfast    furosemide  40 mg Oral BID    INV aspirin/placebo  81 mg Oral Daily    magnesium oxide  400 mg Oral TID    pantoprazole  40 mg Oral Daily    polyethylene glycol  17 g Oral Daily    potassium chloride  40 mEq Oral Daily    warfarin  7.5 mg Oral Daily     PRN Meds:acetaminophen, albuterol sulfate, bisacodyl, dextrose 50%, dextrose 50%, glucagon (human recombinant), glucose, glucose, insulin aspart U-100, magnesium hydroxide 400 mg/5 ml, oxyCODONE, oxyCODONE    Review of patient's allergies indicates:   Allergen Reactions    Lisinopril Anaphylaxis     Objective:     Vital Signs (Most Recent):  Temp: 98.4 °F (36.9 °C) (03/24/18 0815)  Pulse: 93 (03/24/18 0819)  Resp: 16 (03/24/18 0815)  BP: (!) 86/0 (03/24/18 0815)  SpO2: 98 % (03/24/18 0815) Vital Signs (24h Range):  Temp:  [98.3 °F (36.8 °C)-99.5 °F (37.5 °C)] 98.4 °F (36.9 °C)  Pulse:  [73-93] 93  Resp:  [16-18] 16  SpO2:  [94 %-99 %] 98 %  BP: ()/(0-74) 86/0     Patient Vitals for the past 72 hrs (Last 3 readings):   Weight   03/24/18 0700 109.9 kg (242 lb 4.6 oz)   03/22/18 0700 108.1 kg (238 lb 5.1 oz)     Body mass index is 31.97 kg/m².      Intake/Output Summary (Last 24 hours) at 03/24/18 1038  Last data filed at 03/24/18 0100   Gross per 24 hour   Intake             1020 ml   Output                0 ml   Net             1020 ml     Hemodynamic Parameters:    Physical Exam   Constitutional: He appears well-developed and well-nourished. No distress.   HENT:   Head: Normocephalic and atraumatic.    Neck: Normal range of motion. No JVD present.   Cardiovascular: Normal rate.  Exam reveals no friction rub.    No murmur heard.  Normal VAD hum   Pulmonary/Chest: Effort normal. No respiratory distress. He has no wheezes. He has no rales.   Abdominal: Soft. He exhibits no distension.   Musculoskeletal: Normal range of motion. He exhibits no edema.   Neurological: He is alert.   Skin: Skin is warm. Capillary refill takes 2 to 3 seconds. He is not diaphoretic. No erythema.     Significant Labs:  CBC:    Recent Labs  Lab 03/22/18  0625 03/23/18  0828 03/24/18  0701   WBC 11.11 10.03 11.02   RBC 3.04* 3.06* 2.99*   HGB 8.0* 8.1* 7.6*   HCT 26.1* 26.1* 25.4*   * 407* 464*   MCV 86 85 85   MCH 26.3* 26.5* 25.4*   MCHC 30.7* 31.0* 29.9*     BNP:    Recent Labs  Lab 03/19/18  0633 03/21/18  0624 03/23/18  0633   * 565* 744*     CMP:    Recent Labs  Lab 03/19/18  0634  03/21/18  0624 03/22/18  0625 03/23/18  0633 03/24/18  0701     < > 103 96 109 84   CALCIUM 10.0  < > 9.8 9.4 9.6 9.1   ALBUMIN 2.9*  --  2.8*  --  2.9*  --    PROT 7.4  --  7.2  --  7.3  --    *  < > 130* 132* 131* 133*   K 3.7  3.7  < > 4.3  4.3 3.8 4.0 4.3   CO2 29  < > 28 27 28 26   CL 92*  < > 92* 95 95 98   BUN 38*  < > 39* 35* 33* 26*   CREATININE 2.0*  < > 2.2* 2.0* 1.8* 1.7*   ALKPHOS 176*  --  157*  --  153*  --    ALT 57*  --  50*  --  46*  --    AST 63*  --  47*  --  44*  --    BILITOT 2.1*  --  1.5*  --  1.3*  --    < > = values in this interval not displayed.   Coagulation:     Recent Labs  Lab 03/20/18  1756 03/20/18  2249 03/21/18 0624 03/22/18  0625 03/23/18  0633 03/24/18  0701   INR  --   --  2.0* 2.0* 2.3* 2.6*   APTT 41.2* 43.1* 43.9*  43.9*  --   --   --      LDH:    Recent Labs  Lab 03/22/18  0625 03/23/18  0633 03/24/18  0701   * 341* 316*     Microbiology:  Microbiology Results (last 7 days)     Procedure Component Value Units Date/Time    Blood culture [[id number]] Collected:  03/23/18 2220     Order Status:  Completed Specimen:  Blood Updated:  03/24/18 0515     Blood Culture, Routine No Growth to date    Narrative:       Collection has been rescheduled by CAB2 at 3/23/2018 21:47 Reason:   Nurse Shasha is going rescheduled lab for  am collected  Collection has been rescheduled by CAB2 at 3/23/2018 21:47 Reason:   Nurse Shasha is going rescheduled lab for  am collected    Blood culture [[id number]] Collected:  03/23/18 2220    Order Status:  Completed Specimen:  Blood Updated:  03/24/18 0515     Blood Culture, Routine No Growth to date    Narrative:       Collection has been rescheduled by CAB2 at 3/23/2018 21:47 Reason:   Nurse Shasha is going rescheduled lab for  am collected  Collection has been rescheduled by CAB2 at 3/23/2018 21:47 Reason:   Nurse Shasha is going rescheduled lab for  am collected    Aerobic culture [[id number]] Collected:  03/24/18 0226    Order Status:  No result Specimen:  Chest Updated:  03/24/18 0330    Urine culture [[id number]] Collected:  03/23/18 2233    Order Status:  Sent Specimen:  Urine from Urine, Clean Catch Updated:  03/23/18 2332    Blood culture [[id number]]     Order Status:  Canceled Specimen:  Blood     Blood culture [[id number]]     Order Status:  Canceled Specimen:  Blood     Blood culture [[id number]]     Order Status:  Canceled Specimen:  Blood     Blood culture [[id number]] Collected:  03/15/18 1358    Order Status:  Completed Specimen:  Blood from Peripheral, Hand, Left Updated:  03/20/18 1812     Blood Culture, Routine No growth after 5 days.    Blood culture [[id number]] Collected:  03/15/18 1407    Order Status:  Completed Specimen:  Blood from Peripheral, Antecubital, Left Updated:  03/20/18 1812     Blood Culture, Routine No growth after 5 days.          I have reviewed all pertinent labs within the past 24 hours.    Estimated Creatinine Clearance: 66.8 mL/min (A) (based on SCr of 1.7 mg/dL (H)).    Diagnostic Results:  I have reviewed and interpreted all  pertinent imaging results/findings within the past 24 hours.

## 2018-03-24 NOTE — PROGRESS NOTES
Pt midsternal incision draining serosanguinous fluid. Cleaned incision with chlorhexidine swab. Dressed with guaze and tape. Pt requesting MD at bedside. Notified MD Pedro. No further orders were given. Will continue to monitor.

## 2018-03-24 NOTE — PLAN OF CARE
Problem: Patient Care Overview  Goal: Plan of Care Review  Plan of care discussed with patient.  Patient ambulating independently, fall precautions in place. Patient has no complaints of pain. LVAD numbers and dopplers WNL. Discussed medications and care. Patient has no questions at this time.White board updated. Bed locked in lowest position. Side rails up x2. Will continue to monitor.

## 2018-03-24 NOTE — PT/OT/SLP PROGRESS
Occupational Therapy      Patient Name:  Tim Richards   MRN:  [id number]    Attempted to see patient for OT treatment session. Upon entry to room pt reporting frustration about not being d/c'ed on Friday. Pt refusing therapy at this time stating he will engage in ADLs and functional mobility with his wife on this date. Spoke with RN regarding patient's concerns. Will follow up according to POC and as pt is willing.     Codi Perez, OT  3/24/2018

## 2018-03-25 PROBLEM — I10 HYPERTENSION: Status: ACTIVE | Noted: 2018-03-25

## 2018-03-25 PROBLEM — R50.9 ELEVATED TEMPERATURE: Status: ACTIVE | Noted: 2018-03-25

## 2018-03-25 LAB
ANION GAP SERPL CALC-SCNC: 10 MMOL/L
BACTERIA UR CULT: NO GROWTH
BASOPHILS # BLD AUTO: 0.03 K/UL
BASOPHILS NFR BLD: 0.3 %
BUN SERPL-MCNC: 28 MG/DL
CALCIUM SERPL-MCNC: 9.1 MG/DL
CHLORIDE SERPL-SCNC: 100 MMOL/L
CO2 SERPL-SCNC: 24 MMOL/L
CREAT SERPL-MCNC: 1.8 MG/DL
DIFFERENTIAL METHOD: ABNORMAL
EOSINOPHIL # BLD AUTO: 0.1 K/UL
EOSINOPHIL NFR BLD: 1.6 %
ERYTHROCYTE [DISTWIDTH] IN BLOOD BY AUTOMATED COUNT: 16.2 %
EST. GFR  (AFRICAN AMERICAN): 49.3 ML/MIN/1.73 M^2
EST. GFR  (NON AFRICAN AMERICAN): 42.6 ML/MIN/1.73 M^2
GLUCOSE SERPL-MCNC: 127 MG/DL
HCT VFR BLD AUTO: 25.4 %
HGB BLD-MCNC: 7.6 G/DL
IMM GRANULOCYTES # BLD AUTO: 0.05 K/UL
IMM GRANULOCYTES NFR BLD AUTO: 0.6 %
INR PPP: 2.3
LDH SERPL L TO P-CCNC: 343 U/L
LYMPHOCYTES # BLD AUTO: 1.3 K/UL
LYMPHOCYTES NFR BLD: 14.5 %
MAGNESIUM SERPL-MCNC: 2.2 MG/DL
MCH RBC QN AUTO: 26 PG
MCHC RBC AUTO-ENTMCNC: 29.9 G/DL
MCV RBC AUTO: 87 FL
MONOCYTES # BLD AUTO: 0.9 K/UL
MONOCYTES NFR BLD: 10.4 %
NEUTROPHILS # BLD AUTO: 6.3 K/UL
NEUTROPHILS NFR BLD: 72.6 %
NRBC BLD-RTO: 0 /100 WBC
PHOSPHATE SERPL-MCNC: 3.6 MG/DL
PLATELET # BLD AUTO: 414 K/UL
PMV BLD AUTO: 9.5 FL
POTASSIUM SERPL-SCNC: 4 MMOL/L
PROTHROMBIN TIME: 22.6 SEC
RBC # BLD AUTO: 2.92 M/UL
SODIUM SERPL-SCNC: 134 MMOL/L
WBC # BLD AUTO: 8.71 K/UL

## 2018-03-25 PROCEDURE — 25000003 PHARM REV CODE 250: Performed by: PHYSICIAN ASSISTANT

## 2018-03-25 PROCEDURE — 83615 LACTATE (LD) (LDH) ENZYME: CPT

## 2018-03-25 PROCEDURE — 80048 BASIC METABOLIC PNL TOTAL CA: CPT

## 2018-03-25 PROCEDURE — 85025 COMPLETE CBC W/AUTO DIFF WBC: CPT

## 2018-03-25 PROCEDURE — 20600001 HC STEP DOWN PRIVATE ROOM

## 2018-03-25 PROCEDURE — 25000003 PHARM REV CODE 250: Performed by: THORACIC SURGERY (CARDIOTHORACIC VASCULAR SURGERY)

## 2018-03-25 PROCEDURE — 25000003 PHARM REV CODE 250: Performed by: NURSE PRACTITIONER

## 2018-03-25 PROCEDURE — 83735 ASSAY OF MAGNESIUM: CPT

## 2018-03-25 PROCEDURE — 84100 ASSAY OF PHOSPHORUS: CPT

## 2018-03-25 PROCEDURE — 99232 SBSQ HOSP IP/OBS MODERATE 35: CPT | Mod: ,,, | Performed by: INTERNAL MEDICINE

## 2018-03-25 PROCEDURE — 27000248 HC VAD-ADDITIONAL DAY

## 2018-03-25 PROCEDURE — 85610 PROTHROMBIN TIME: CPT

## 2018-03-25 PROCEDURE — 25000003 PHARM REV CODE 250: Performed by: INTERNAL MEDICINE

## 2018-03-25 RX ORDER — AMLODIPINE BESYLATE 5 MG/1
5 TABLET ORAL DAILY
Status: DISCONTINUED | OUTPATIENT
Start: 2018-03-25 | End: 2018-03-26 | Stop reason: HOSPADM

## 2018-03-25 RX ORDER — AMLODIPINE BESYLATE 10 MG/1
10 TABLET ORAL DAILY
Status: DISCONTINUED | OUTPATIENT
Start: 2018-03-25 | End: 2018-03-25

## 2018-03-25 RX ADMIN — AMIODARONE HYDROCHLORIDE 200 MG: 200 TABLET ORAL at 09:03

## 2018-03-25 RX ADMIN — OXYCODONE HYDROCHLORIDE 10 MG: 5 TABLET ORAL at 05:03

## 2018-03-25 RX ADMIN — OXYCODONE HYDROCHLORIDE 10 MG: 5 TABLET ORAL at 09:03

## 2018-03-25 RX ADMIN — DOCUSATE SODIUM 200 MG: 100 CAPSULE, LIQUID FILLED ORAL at 09:03

## 2018-03-25 RX ADMIN — PANTOPRAZOLE SODIUM 40 MG: 40 TABLET, DELAYED RELEASE ORAL at 09:03

## 2018-03-25 RX ADMIN — Medication 81 MG: at 09:03

## 2018-03-25 RX ADMIN — FUROSEMIDE 40 MG: 40 TABLET ORAL at 05:03

## 2018-03-25 RX ADMIN — AMLODIPINE BESYLATE 5 MG: 5 TABLET ORAL at 09:03

## 2018-03-25 RX ADMIN — FUROSEMIDE 40 MG: 40 TABLET ORAL at 09:03

## 2018-03-25 RX ADMIN — MAGNESIUM OXIDE TAB 400 MG (241.3 MG ELEMENTAL MG) 400 MG: 400 (241.3 MG) TAB at 09:03

## 2018-03-25 RX ADMIN — MAGNESIUM OXIDE TAB 400 MG (241.3 MG ELEMENTAL MG) 400 MG: 400 (241.3 MG) TAB at 03:03

## 2018-03-25 RX ADMIN — FERROUS GLUCONATE TAB 324 MG (37.5 MG ELEMENTAL IRON) 324 MG: 324 (37.5 FE) TAB at 09:03

## 2018-03-25 RX ADMIN — WARFARIN SODIUM 7.5 MG: 7.5 TABLET ORAL at 05:03

## 2018-03-25 NOTE — PLAN OF CARE
Problem: Patient Care Overview  Goal: Plan of Care Review  Outcome: Ongoing (interventions implemented as appropriate)  Patient remained free from falls/injury/trauma throughout shift. No complaints of chest pain or SOB. PRN pain medication provided for incisional pain. Dopplers, LVAD numbers, and MAP's WDL. LVAD dressing change completed by wife. Dressing is CDI with no signs of active bleeding or drainage. Midsternal incision is CDI with no signs of active bleeding or drainage. CVP this morning was 10. Still pending BC results. Plan of care reviewed with patient. Patient verbalized understanding. All questions encouraged and answered. Will continue to monitor.

## 2018-03-25 NOTE — PROGRESS NOTES
"Ordered was placed this AM to D/C PICC and place PIV. I have spoke with pt 2-3 times about importance of removing PICC because of infection risk, etc. Pt states "why take out what's working when I'm going home tomorrow? That just doesn't make sense". Pt keeps saying "we'll revisit the idea later" Notified Dr Tirado. Will continue to monitor pt.  "

## 2018-03-25 NOTE — PROGRESS NOTES
Notified YANET Rosario that MAP this AM was 66. PA ordered to decrease amlodipine back to 5mg for this AM. Will continue to monitor pt.

## 2018-03-25 NOTE — SUBJECTIVE & OBJECTIVE
Interval History: drainage from sternal incision has resolved.  Cultures taken yesterday no growth.  Started Amlodipine yesterday for BP control.  He has no complaints today.  Still has PICC line; will remove today     Continuous Infusions:    Scheduled Meds:   amiodarone  200 mg Oral Daily    amLODIPine  5 mg Oral Daily    docusate sodium  200 mg Oral QHS    ferrous gluconate  324 mg Oral Daily with breakfast    furosemide  40 mg Oral BID    hydrALAZINE  10 mg Oral Once    INV aspirin/placebo  81 mg Oral Daily    magnesium oxide  400 mg Oral TID    pantoprazole  40 mg Oral Daily    polyethylene glycol  17 g Oral Daily    potassium chloride  40 mEq Oral Daily    warfarin  7.5 mg Oral Daily     PRN Meds:acetaminophen, albuterol sulfate, bisacodyl, dextrose 50%, dextrose 50%, glucagon (human recombinant), glucose, glucose, insulin aspart U-100, magnesium hydroxide 400 mg/5 ml, oxyCODONE, oxyCODONE    Review of patient's allergies indicates:   Allergen Reactions    Lisinopril Anaphylaxis     Objective:     Vital Signs (Most Recent):  Temp: 98 °F (36.7 °C) (03/25/18 0500)  Pulse: 81 (03/25/18 0718)  Resp: 18 (03/24/18 2356)  BP: (!) 80/0 (03/25/18 0500)  SpO2: 98 % (03/24/18 2356) Vital Signs (24h Range):  Temp:  [98 °F (36.7 °C)-99 °F (37.2 °C)] 98 °F (36.7 °C)  Pulse:  [80-90] 81  Resp:  [18] 18  SpO2:  [93 %-98 %] 98 %  BP: ()/(0-62) 80/0     Patient Vitals for the past 72 hrs (Last 3 readings):   Weight   03/25/18 0718 110.8 kg (244 lb 4.3 oz)   03/24/18 0700 109.9 kg (242 lb 4.6 oz)     Body mass index is 32.23 kg/m².      Intake/Output Summary (Last 24 hours) at 03/25/18 1135  Last data filed at 03/25/18 0100   Gross per 24 hour   Intake              180 ml   Output                0 ml   Net              180 ml     Hemodynamic Parameters:    Physical Exam   Constitutional: He appears well-developed and well-nourished. No distress.   HENT:   Head: Normocephalic and atraumatic.   Neck: Normal  range of motion. No JVD present.   Cardiovascular: Normal rate.  Exam reveals no friction rub.    No murmur heard.  Normal VAD hum   Pulmonary/Chest: Effort normal. No respiratory distress. He has no wheezes. He has no rales.   Abdominal: Soft. He exhibits no distension.   Musculoskeletal: Normal range of motion. He exhibits no edema.   Neurological: He is alert.   Skin: Skin is warm. Capillary refill takes 2 to 3 seconds. He is not diaphoretic. No erythema.     Significant Labs:  CBC:    Recent Labs  Lab 03/23/18  0828 03/24/18  0701 03/25/18  0413   WBC 10.03 11.02 8.71   RBC 3.06* 2.99* 2.92*   HGB 8.1* 7.6* 7.6*   HCT 26.1* 25.4* 25.4*   * 464* 414*   MCV 85 85 87   MCH 26.5* 25.4* 26.0*   MCHC 31.0* 29.9* 29.9*     BNP:    Recent Labs  Lab 03/19/18  0633 03/21/18  0624 03/23/18  0633   * 565* 744*     CMP:    Recent Labs  Lab 03/19/18  0634  03/21/18  0624  03/23/18  0633 03/24/18  0701 03/25/18  0413     < > 103  < > 109 84 127*   CALCIUM 10.0  < > 9.8  < > 9.6 9.1 9.1   ALBUMIN 2.9*  --  2.8*  --  2.9*  --   --    PROT 7.4  --  7.2  --  7.3  --   --    *  < > 130*  < > 131* 133* 134*   K 3.7  3.7  < > 4.3  4.3  < > 4.0 4.3 4.0   CO2 29  < > 28  < > 28 26 24   CL 92*  < > 92*  < > 95 98 100   BUN 38*  < > 39*  < > 33* 26* 28*   CREATININE 2.0*  < > 2.2*  < > 1.8* 1.7* 1.8*   ALKPHOS 176*  --  157*  --  153*  --   --    ALT 57*  --  50*  --  46*  --   --    AST 63*  --  47*  --  44*  --   --    BILITOT 2.1*  --  1.5*  --  1.3*  --   --    < > = values in this interval not displayed.   Coagulation:     Recent Labs  Lab 03/20/18  1756 03/20/18  2249 03/21/18  0624  03/23/18  0633 03/24/18  0701 03/25/18  0413   INR  --   --  2.0*  < > 2.3* 2.6* 2.3*   APTT 41.2* 43.1* 43.9*  43.9*  --   --   --   --    < > = values in this interval not displayed.  LDH:    Recent Labs  Lab 03/23/18  0633 03/24/18  0701 03/25/18  0413   * 316* 343*     Microbiology:  Microbiology Results (last 7  days)     Procedure Component Value Units Date/Time    Aerobic culture [[id number]] Collected:  03/24/18 0226    Order Status:  Completed Specimen:  Chest Updated:  03/25/18 0738     Aerobic Bacterial Culture No growth    Urine culture [[id number]] Collected:  03/23/18 2233    Order Status:  Completed Specimen:  Urine from Urine, Clean Catch Updated:  03/25/18 0347     Urine Culture, Routine No growth    Blood culture [[id number]] Collected:  03/23/18 2220    Order Status:  Completed Specimen:  Blood Updated:  03/24/18 2312     Blood Culture, Routine No Growth to date     Blood Culture, Routine No Growth to date    Narrative:       Collection has been rescheduled by CAB2 at 3/23/2018 21:47 Reason:   Nurse Shasha is going rescheduled lab for  am collected  Collection has been rescheduled by CAB2 at 3/23/2018 21:47 Reason:   Nurse Shasha is going rescheduled lab for  am collected    Blood culture [[id number]] Collected:  03/23/18 2220    Order Status:  Completed Specimen:  Blood Updated:  03/24/18 2312     Blood Culture, Routine No Growth to date     Blood Culture, Routine No Growth to date    Narrative:       Collection has been rescheduled by CAB2 at 3/23/2018 21:47 Reason:   Nurse Shasha is going rescheduled lab for  am collected  Collection has been rescheduled by CAB2 at 3/23/2018 21:47 Reason:   Nurse Shasah is going rescheduled lab for  am collected    Blood culture [[id number]]     Order Status:  Canceled Specimen:  Blood     Blood culture [[id number]]     Order Status:  Canceled Specimen:  Blood     Blood culture [[id number]]     Order Status:  Canceled Specimen:  Blood     Blood culture [[id number]] Collected:  03/15/18 1358    Order Status:  Completed Specimen:  Blood from Peripheral, Hand, Left Updated:  03/20/18 1812     Blood Culture, Routine No growth after 5 days.    Blood culture [[id number]] Collected:  03/15/18 1407    Order Status:  Completed Specimen:  Blood from Peripheral, Antecubital, Left Updated:   03/20/18 1812     Blood Culture, Routine No growth after 5 days.          I have reviewed all pertinent labs within the past 24 hours.    Estimated Creatinine Clearance: 63.4 mL/min (A) (based on SCr of 1.8 mg/dL (H)).    Diagnostic Results:  I have reviewed and interpreted all pertinent imaging results/findings within the past 24 hours.

## 2018-03-25 NOTE — PROGRESS NOTES
03/24/18 2034 03/24/18 2035   Vital Signs   /60 (!) 82/0   MAP (mmHg) 83 --    BP Location Left arm Left arm   BP Method --  Doppler   Patient Position Lying Lying     Notified Dr. RODOLFO Aguirre MD of patient's above vitals. Ordered to give 10 mg hydralazine Po. Will administer and continue to monitor.

## 2018-03-25 NOTE — PROGRESS NOTES
"Pt finally agreed to allow an "experienced RN" to try for PIV in order to remove PICC. Charge Rn was unsuccessful. Will pass onto night RN  "

## 2018-03-25 NOTE — PROGRESS NOTES
[facility]  Heart Transplant  Progress Note    Patient Name: Tim Richards  MRN: [id number]  Admission Date: 3/1/2018  Hospital Length of Stay: 24 days  Attending Physician: Antonio Hadley Jr.,*  Primary Care Provider: Ja Méndez MD  Principal Problem:LVAD (left ventricular assist device) present    Subjective:     Interval History: drainage from sternal incision has resolved.  Cultures taken yesterday no growth.  Started Amlodipine yesterday for BP control.  He has no complaints today.  Still has PICC line; will remove today     Continuous Infusions:    Scheduled Meds:   amiodarone  200 mg Oral Daily    amLODIPine  5 mg Oral Daily    docusate sodium  200 mg Oral QHS    ferrous gluconate  324 mg Oral Daily with breakfast    furosemide  40 mg Oral BID    hydrALAZINE  10 mg Oral Once    INV aspirin/placebo  81 mg Oral Daily    magnesium oxide  400 mg Oral TID    pantoprazole  40 mg Oral Daily    polyethylene glycol  17 g Oral Daily    potassium chloride  40 mEq Oral Daily    warfarin  7.5 mg Oral Daily     PRN Meds:acetaminophen, albuterol sulfate, bisacodyl, dextrose 50%, dextrose 50%, glucagon (human recombinant), glucose, glucose, insulin aspart U-100, magnesium hydroxide 400 mg/5 ml, oxyCODONE, oxyCODONE    Review of patient's allergies indicates:   Allergen Reactions    Lisinopril Anaphylaxis     Objective:     Vital Signs (Most Recent):  Temp: 98 °F (36.7 °C) (03/25/18 0500)  Pulse: 81 (03/25/18 0718)  Resp: 18 (03/24/18 2356)  BP: (!) 80/0 (03/25/18 0500)  SpO2: 98 % (03/24/18 2356) Vital Signs (24h Range):  Temp:  [98 °F (36.7 °C)-99 °F (37.2 °C)] 98 °F (36.7 °C)  Pulse:  [80-90] 81  Resp:  [18] 18  SpO2:  [93 %-98 %] 98 %  BP: ()/(0-62) 80/0     Patient Vitals for the past 72 hrs (Last 3 readings):   Weight   03/25/18 0718 110.8 kg (244 lb 4.3 oz)   03/24/18 0700 109.9 kg (242 lb 4.6 oz)     Body mass index is 32.23 kg/m².      Intake/Output Summary  (Last 24 hours) at 03/25/18 1135  Last data filed at 03/25/18 0100   Gross per 24 hour   Intake              180 ml   Output                0 ml   Net              180 ml     Hemodynamic Parameters:    Physical Exam   Constitutional: He appears well-developed and well-nourished. No distress.   HENT:   Head: Normocephalic and atraumatic.   Neck: Normal range of motion. No JVD present.   Cardiovascular: Normal rate.  Exam reveals no friction rub.    No murmur heard.  Normal VAD hum   Pulmonary/Chest: Effort normal. No respiratory distress. He has no wheezes. He has no rales.   Abdominal: Soft. He exhibits no distension.   Musculoskeletal: Normal range of motion. He exhibits no edema.   Neurological: He is alert.   Skin: Skin is warm. Capillary refill takes 2 to 3 seconds. He is not diaphoretic. No erythema.     Significant Labs:  CBC:    Recent Labs  Lab 03/23/18  0828 03/24/18  0701 03/25/18  0413   WBC 10.03 11.02 8.71   RBC 3.06* 2.99* 2.92*   HGB 8.1* 7.6* 7.6*   HCT 26.1* 25.4* 25.4*   * 464* 414*   MCV 85 85 87   MCH 26.5* 25.4* 26.0*   MCHC 31.0* 29.9* 29.9*     BNP:    Recent Labs  Lab 03/19/18  0633 03/21/18  0624 03/23/18  0633   * 565* 744*     CMP:    Recent Labs  Lab 03/19/18  0634  03/21/18  0624  03/23/18  0633 03/24/18  0701 03/25/18  0413     < > 103  < > 109 84 127*   CALCIUM 10.0  < > 9.8  < > 9.6 9.1 9.1   ALBUMIN 2.9*  --  2.8*  --  2.9*  --   --    PROT 7.4  --  7.2  --  7.3  --   --    *  < > 130*  < > 131* 133* 134*   K 3.7  3.7  < > 4.3  4.3  < > 4.0 4.3 4.0   CO2 29  < > 28  < > 28 26 24   CL 92*  < > 92*  < > 95 98 100   BUN 38*  < > 39*  < > 33* 26* 28*   CREATININE 2.0*  < > 2.2*  < > 1.8* 1.7* 1.8*   ALKPHOS 176*  --  157*  --  153*  --   --    ALT 57*  --  50*  --  46*  --   --    AST 63*  --  47*  --  44*  --   --    BILITOT 2.1*  --  1.5*  --  1.3*  --   --    < > = values in this interval not displayed.   Coagulation:     Recent Labs  Lab 03/20/18  1874  03/20/18  2249 03/21/18  0624  03/23/18  0633 03/24/18  0701 03/25/18  0413   INR  --   --  2.0*  < > 2.3* 2.6* 2.3*   APTT 41.2* 43.1* 43.9*  43.9*  --   --   --   --    < > = values in this interval not displayed.  LDH:    Recent Labs  Lab 03/23/18  0633 03/24/18  0701 03/25/18  0413   * 316* 343*     Microbiology:  Microbiology Results (last 7 days)     Procedure Component Value Units Date/Time    Aerobic culture [[id number]] Collected:  03/24/18 0226    Order Status:  Completed Specimen:  Chest Updated:  03/25/18 0738     Aerobic Bacterial Culture No growth    Urine culture [[id number]] Collected:  03/23/18 2233    Order Status:  Completed Specimen:  Urine from Urine, Clean Catch Updated:  03/25/18 0347     Urine Culture, Routine No growth    Blood culture [[id number]] Collected:  03/23/18 2220    Order Status:  Completed Specimen:  Blood Updated:  03/24/18 2312     Blood Culture, Routine No Growth to date     Blood Culture, Routine No Growth to date    Narrative:       Collection has been rescheduled by CAB2 at 3/23/2018 21:47 Reason:   Nurse Shasha is going rescheduled lab for  am collected  Collection has been rescheduled by CAB2 at 3/23/2018 21:47 Reason:   Nurse Shasha is going rescheduled lab for  am collected    Blood culture [[id number]] Collected:  03/23/18 2220    Order Status:  Completed Specimen:  Blood Updated:  03/24/18 2312     Blood Culture, Routine No Growth to date     Blood Culture, Routine No Growth to date    Narrative:       Collection has been rescheduled by CAB2 at 3/23/2018 21:47 Reason:   Nurse Shasha is going rescheduled lab for  am collected  Collection has been rescheduled by CAB2 at 3/23/2018 21:47 Reason:   Nurse Shasha is going rescheduled lab for  am collected    Blood culture [[id number]]     Order Status:  Canceled Specimen:  Blood     Blood culture [[id number]]     Order Status:  Canceled Specimen:  Blood     Blood culture [[id number]]     Order Status:  Canceled Specimen:   Blood     Blood culture [[id number]] Collected:  03/15/18 1358    Order Status:  Completed Specimen:  Blood from Peripheral, Hand, Left Updated:  03/20/18 1812     Blood Culture, Routine No growth after 5 days.    Blood culture [[id number]] Collected:  03/15/18 1407    Order Status:  Completed Specimen:  Blood from Peripheral, Antecubital, Left Updated:  03/20/18 1812     Blood Culture, Routine No growth after 5 days.          I have reviewed all pertinent labs within the past 24 hours.    Estimated Creatinine Clearance: 63.4 mL/min (A) (based on SCr of 1.8 mg/dL (H)).    Diagnostic Results:  I have reviewed and interpreted all pertinent imaging results/findings within the past 24 hours.    Assessment and Plan:     Mr. Richards is a 52 y.o. year old Black or  male who has presents as f/u from hospitalization for ADHF after presenting to clinic 1/10/17 as initial consult. advanced surgical options (LVAD/OHT).    This 51 yo BM has had CHF since 2011 at which time an angiogram did not demonstrate any CAD.  He is on amiodarone for VT. He was admitted from 1/12-1/17/18 (see d/c summary) where he was treated for ADHF and initiation of w/u for advanced options. RHC during that admission showed RA 17 and PCWP 35 as well as severely reduced CI 1.6. Though not optimized, he was discharged per his request so as not to lose his job/insurance--see Dr. Hadley's attestation for full details on d/c summary. Since d/c, his Scr has risen from 2.0 on discharge to 2.8 (1/23/18). His BNP had declined to 1040.  He presents today for scheduled hospital f/u.      Today, he reports feeling well. He says he has more energy than usual. His only complaints are cramping and xerosis. He denies n/v/d, no CP, palpitations or syncope. He has stable orthostatic dizziness/LH. No PND or orthopnea. His COLEMAN is improved from discharge and his weight is down about 5-7 pounds on his home scale. Of note, his Scr on labs from 2 days ago showed  Scr 2.8 up from 2.0. T. Bili was down from 1.1 to 0.6 and BNP was down to 1040 from 1700.  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 No labs initially ordered but I ordered and sent him down.     * LVAD (left ventricular assist device) present    -HeartMate II Implanted 3/8/18 as DT with repositioning of outflow graft 3/9/18.  S/p chest closure 3/10/18  -HTS Primary as of today  -Implanted by Dr. Kaufman  -Anticoagulation per Primary Coumadin  -Antiplatelets; Patient is enrolled in PREVENT II  -Speed set at 9000, LSL 8600 rpm  -Not listed for OHTx  - Weaned off Dobutamine and patient looks clinically compensated   -Patient doing well on Lasix po BID         Acute on chronic combined systolic and diastolic heart failure    -NICM   -Echo prior to VAD- EF: <10%; LVEDD: 9.7 cm  -now s/p HM II LVAD- most recent Echo 3/14/18- LVEDD 8.5.  -Echo stable off Dobutamine   -Euvolemic on exam, will continue po Lasix at current dose BID              Hypertension    -started Norvasc yesterday  -BP better but still with some elevated readings.  Will give Norvasc another day and if not improved tomorrow increase to 10mg         Elevated temperature    -cultures negative  -patient has been afebrile  -no signs of infection        CKD (chronic kidney disease), stage III    -creatinine stable and at baseline  -Baseline creat 1.6-2.0. (1.8 today)  -appears euvolemic, will monitor creatinine trends.         PVT (paroxysmal ventricular tachycardia)    -has ICD  -Transitioned from IV Amiodarone infusion to po         Hyperbilirubinemia    -likely secondary to above, trending down  -RUQ ultrasound neg        Biventricular implantable cardioverter-defibrillator in situ    - in place  - ICD interrogated, event noted on 2/25/18 with appropriate shock for VT  - Continue Amiodarone PO        Anemia    -Transfused a unit PRBC's 3/12  -H/H stable  -Iron studies reviewed, in EPIC        Pneumothorax    -IR unable to place chest  tube or pig tail; Pneumothorax has resolved on CXR            YANET Coleman  Heart Transplant  [facility]

## 2018-03-25 NOTE — ASSESSMENT & PLAN NOTE
-started Norvasc yesterday  -BP better but still with some elevated readings.  Will give Norvasc another day and if not improved tomorrow increase to 10mg

## 2018-03-26 VITALS
HEIGHT: 73 IN | TEMPERATURE: 98 F | OXYGEN SATURATION: 96 % | BODY MASS INDEX: 32.37 KG/M2 | RESPIRATION RATE: 18 BRPM | HEART RATE: 86 BPM | SYSTOLIC BLOOD PRESSURE: 82 MMHG | WEIGHT: 244.25 LBS

## 2018-03-26 LAB
ALBUMIN SERPL BCP-MCNC: 2.6 G/DL
ALP SERPL-CCNC: 129 U/L
ALT SERPL W/O P-5'-P-CCNC: 33 U/L
ANION GAP SERPL CALC-SCNC: 11 MMOL/L
AST SERPL-CCNC: 32 U/L
BASOPHILS # BLD AUTO: 0.01 K/UL
BASOPHILS NFR BLD: 0.1 %
BILIRUB DIRECT SERPL-MCNC: 0.7 MG/DL
BILIRUB SERPL-MCNC: 1 MG/DL
BNP SERPL-MCNC: 817 PG/ML
BUN SERPL-MCNC: 23 MG/DL
CALCIUM SERPL-MCNC: 8.9 MG/DL
CHLORIDE SERPL-SCNC: 99 MMOL/L
CO2 SERPL-SCNC: 26 MMOL/L
CREAT SERPL-MCNC: 1.6 MG/DL
CRP SERPL-MCNC: 54.4 MG/L
DIFFERENTIAL METHOD: ABNORMAL
EOSINOPHIL # BLD AUTO: 0.2 K/UL
EOSINOPHIL NFR BLD: 2.1 %
ERYTHROCYTE [DISTWIDTH] IN BLOOD BY AUTOMATED COUNT: 16.6 %
EST. GFR  (AFRICAN AMERICAN): 56.8 ML/MIN/1.73 M^2
EST. GFR  (NON AFRICAN AMERICAN): 49.2 ML/MIN/1.73 M^2
GLUCOSE SERPL-MCNC: 165 MG/DL
HCT VFR BLD AUTO: 25.3 %
HGB BLD-MCNC: 7.5 G/DL
IMM GRANULOCYTES # BLD AUTO: 0.04 K/UL
IMM GRANULOCYTES NFR BLD AUTO: 0.5 %
INR PPP: 2.4
LDH SERPL L TO P-CCNC: 303 U/L
LYMPHOCYTES # BLD AUTO: 1.1 K/UL
LYMPHOCYTES NFR BLD: 14.9 %
MAGNESIUM SERPL-MCNC: 2.1 MG/DL
MCH RBC QN AUTO: 25.5 PG
MCHC RBC AUTO-ENTMCNC: 29.6 G/DL
MCV RBC AUTO: 86 FL
MONOCYTES # BLD AUTO: 0.6 K/UL
MONOCYTES NFR BLD: 8.4 %
NEUTROPHILS # BLD AUTO: 5.6 K/UL
NEUTROPHILS NFR BLD: 74 %
NRBC BLD-RTO: 0 /100 WBC
PHOSPHATE SERPL-MCNC: 3.6 MG/DL
PLATELET # BLD AUTO: 412 K/UL
PMV BLD AUTO: 9.2 FL
POTASSIUM SERPL-SCNC: 3.9 MMOL/L
PREALB SERPL-MCNC: 21 MG/DL
PROT SERPL-MCNC: 6.6 G/DL
PROTHROMBIN TIME: 22.9 SEC
RBC # BLD AUTO: 2.94 M/UL
SODIUM SERPL-SCNC: 136 MMOL/L
WBC # BLD AUTO: 7.63 K/UL

## 2018-03-26 PROCEDURE — 83735 ASSAY OF MAGNESIUM: CPT

## 2018-03-26 PROCEDURE — 84100 ASSAY OF PHOSPHORUS: CPT

## 2018-03-26 PROCEDURE — 97110 THERAPEUTIC EXERCISES: CPT

## 2018-03-26 PROCEDURE — 25000003 PHARM REV CODE 250: Performed by: PHYSICIAN ASSISTANT

## 2018-03-26 PROCEDURE — 93750 INTERROGATION VAD IN PERSON: CPT | Mod: ,,, | Performed by: INTERNAL MEDICINE

## 2018-03-26 PROCEDURE — 80048 BASIC METABOLIC PNL TOTAL CA: CPT

## 2018-03-26 PROCEDURE — 84134 ASSAY OF PREALBUMIN: CPT

## 2018-03-26 PROCEDURE — 83615 LACTATE (LD) (LDH) ENZYME: CPT

## 2018-03-26 PROCEDURE — 85610 PROTHROMBIN TIME: CPT

## 2018-03-26 PROCEDURE — 36415 COLL VENOUS BLD VENIPUNCTURE: CPT

## 2018-03-26 PROCEDURE — 25000003 PHARM REV CODE 250: Performed by: NURSE PRACTITIONER

## 2018-03-26 PROCEDURE — 86140 C-REACTIVE PROTEIN: CPT

## 2018-03-26 PROCEDURE — 27000248 HC VAD-ADDITIONAL DAY

## 2018-03-26 PROCEDURE — 25000003 PHARM REV CODE 250: Performed by: THORACIC SURGERY (CARDIOTHORACIC VASCULAR SURGERY)

## 2018-03-26 PROCEDURE — 99233 SBSQ HOSP IP/OBS HIGH 50: CPT | Mod: ,,, | Performed by: INTERNAL MEDICINE

## 2018-03-26 PROCEDURE — 85025 COMPLETE CBC W/AUTO DIFF WBC: CPT

## 2018-03-26 PROCEDURE — 80076 HEPATIC FUNCTION PANEL: CPT

## 2018-03-26 PROCEDURE — 83880 ASSAY OF NATRIURETIC PEPTIDE: CPT

## 2018-03-26 RX ORDER — PANTOPRAZOLE SODIUM 40 MG/1
40 TABLET, DELAYED RELEASE ORAL DAILY
Qty: 30 TABLET | Refills: 11 | Status: SHIPPED | OUTPATIENT
Start: 2018-03-26 | End: 2019-03-20 | Stop reason: SDUPTHER

## 2018-03-26 RX ORDER — DOCUSATE SODIUM 100 MG/1
200 CAPSULE, LIQUID FILLED ORAL NIGHTLY
Refills: 0 | COMMUNITY
Start: 2018-03-26 | End: 2018-05-17

## 2018-03-26 RX ORDER — ALLOPURINOL 100 MG/1
100 TABLET ORAL DAILY
Qty: 30 TABLET | Refills: 2 | Status: SHIPPED | OUTPATIENT
Start: 2018-03-26 | End: 2018-06-16 | Stop reason: SDUPTHER

## 2018-03-26 RX ORDER — FUROSEMIDE 40 MG/1
40 TABLET ORAL 2 TIMES DAILY
Qty: 60 TABLET | Refills: 11 | Status: SHIPPED | OUTPATIENT
Start: 2018-03-26 | End: 2018-04-19 | Stop reason: SDUPTHER

## 2018-03-26 RX ORDER — LANOLIN ALCOHOL/MO/W.PET/CERES
400 CREAM (GRAM) TOPICAL 3 TIMES DAILY
Qty: 90 TABLET | Refills: 5 | Status: SHIPPED | OUTPATIENT
Start: 2018-03-26 | End: 2018-10-03 | Stop reason: SDUPTHER

## 2018-03-26 RX ORDER — AMLODIPINE BESYLATE 5 MG/1
5 TABLET ORAL DAILY
Qty: 30 TABLET | Refills: 11 | Status: SHIPPED | OUTPATIENT
Start: 2018-03-27 | End: 2018-03-29 | Stop reason: SDUPTHER

## 2018-03-26 RX ORDER — POTASSIUM CHLORIDE 750 MG/1
20 TABLET, EXTENDED RELEASE ORAL DAILY
Qty: 60 TABLET | Refills: 3 | Status: SHIPPED | OUTPATIENT
Start: 2018-03-26 | End: 2018-04-19

## 2018-03-26 RX ORDER — FERROUS GLUCONATE 324(38)MG
324 TABLET ORAL
Qty: 30 TABLET | Refills: 11 | Status: SHIPPED | OUTPATIENT
Start: 2018-03-26 | End: 2019-02-21 | Stop reason: SDUPTHER

## 2018-03-26 RX ORDER — WARFARIN SODIUM 5 MG/1
TABLET ORAL
Qty: 45 TABLET | Refills: 11 | Status: SHIPPED | OUTPATIENT
Start: 2018-03-26 | End: 2019-03-21 | Stop reason: SDUPTHER

## 2018-03-26 RX ADMIN — AMLODIPINE BESYLATE 5 MG: 5 TABLET ORAL at 08:03

## 2018-03-26 RX ADMIN — OXYCODONE HYDROCHLORIDE 10 MG: 5 TABLET ORAL at 05:03

## 2018-03-26 RX ADMIN — FUROSEMIDE 40 MG: 40 TABLET ORAL at 08:03

## 2018-03-26 RX ADMIN — PANTOPRAZOLE SODIUM 40 MG: 40 TABLET, DELAYED RELEASE ORAL at 08:03

## 2018-03-26 RX ADMIN — Medication 81 MG: at 08:03

## 2018-03-26 RX ADMIN — POTASSIUM CHLORIDE 40 MEQ: 1500 TABLET, EXTENDED RELEASE ORAL at 08:03

## 2018-03-26 RX ADMIN — FERROUS GLUCONATE TAB 324 MG (37.5 MG ELEMENTAL IRON) 324 MG: 324 (37.5 FE) TAB at 08:03

## 2018-03-26 RX ADMIN — MAGNESIUM OXIDE TAB 400 MG (241.3 MG ELEMENTAL MG) 400 MG: 400 (241.3 MG) TAB at 08:03

## 2018-03-26 RX ADMIN — AMIODARONE HYDROCHLORIDE 200 MG: 200 TABLET ORAL at 08:03

## 2018-03-26 NOTE — PLAN OF CARE
Problem: Patient Care Overview  Goal: Plan of Care Review  Outcome: Ongoing (interventions implemented as appropriate)  Patient remained free from falls/injury/trauma throughout shift. No complaints of chest pain, SOB, or discomfort. VSS. Midsternal incision CDI with no signs of active bleeding or drainage. LVAD dressing CDI and completed by wife. Possible discharge today. Plan of care reviewed with patient. Patient verbalized understanding. All questions encouraged and answered. Will continue to monitor.

## 2018-03-26 NOTE — PLAN OF CARE
Problem: Patient Care Overview  Goal: Plan of Care Review  Outcome: Ongoing (interventions implemented as appropriate)  Discussed Plan of Care with patient. VS stable. Ambulates on own without difficulty. Fall precautions in place. Sternal and bleeding precautions reviewed and maintained. Pain denied. AVS reviewed with patient and spouse. Follow-up appointments discussed, as well as important phone numbers. Home medication dose, frequency, indication, and side effects discussed. Pain prescription given to patient by pharmacst. LVAD equipment inventoried, LVAD supplies brought to vehicle and Med card given to patient by pharmacist. Noted which medications were given and which still need to be taken this evening.  All questions and concerns were addressed. Will continue to monitor patient.

## 2018-03-26 NOTE — HOSPITAL COURSE
Patient admitted to [facility]. He was placed on  gtt and had decrease in Cr. Workup was finished and plan was made to move to LVAD. HeartMate II Implanted 3/8/18 as DT with repositioning of outflow graft 3/9/18.  S/p chest closure 3/10/18.  Patient is enrolled in PREVENT II and his speed was set at 9000 rpm. He was weaned off  once on the floor and lasix was transitioned to po BID. He was started on norvasc 5 mg daily with BP controlled between 60-80 Doppler. Cr trended down to 1.6 on discharge and hgb remained stable on oral iron. He will follow up in 3 days in VAD clinic with labs

## 2018-03-26 NOTE — PLAN OF CARE
Problem: Patient Care Overview  Goal: Individualization & Mutuality  PMHx:CHF, DCM, CKD, gout, HTN, V. Tach, AICD, colonoscopy   1/12-1/17: txtmt for ADHF. RHC      3/1: Admitted for LVAD placement   3/8: LVAD placement  3/9: washout chest closure  3/12: 1 u PRBC  314: abdominal ultrasound, echo, oob to chair    APTT goal 40-50        Outcome: Ongoing (interventions implemented as appropriate)  Plan of care discussed with patient.  Patient ambulating independently, fall precautions in place.Continuing to encourage sternal precautions, IS, and ambulation. LVAD DP and numbers WNL, smooth LVAD hum. Patient has no complaints of pain. Discussed medications and care. VSS. AAOx4. Encouraged workbook, reviewed education, filled in binder. Patient has no questions at this time. Will continue to monitor.

## 2018-03-26 NOTE — PROCEDURES
Patient awake with no family at bedside. VAD interrogation completed this AM in the event changes needed to be made. Will continue to monitor for further issues.     Pulsatile: Yes, intermittent   VAD Sounds: Smooth  Problems / Issues / Alarms with VAD if any: None noted      VAD Interrogation:  TXP LVAD INTERROGATIONS 3/26/2018 3/26/2018 3/26/2018 3/26/2018 3/26/2018 3/25/2018 3/25/2018   Type HeartMate II (No Data) HeartMate II HeartMate3 HeartMate3 HeartMate3 -   Flow 7.3 - 7.9 6.4 6.6 7.5 5.8   Speed 9000 - 9000 9000 9000 9000 9000   PI 4.5 - 4.9 4.7 4.7 4.0. 5.5   Power ([place]) 6.4 - 5.5 6.0 6.1 6.5 5.8   LSL - - - - - - -   Pulsatility - - - - - - -   }

## 2018-03-26 NOTE — PROGRESS NOTES
[facility]  Heart Transplant  Progress Note    Patient Name: Tim Richards  MRN: [id number]  Admission Date: 3/1/2018  Hospital Length of Stay: 25 days  Attending Physician: Antonio Hadley Jr.,*  Primary Care Provider: Ja Méndez MD  Principal Problem:LVAD (left ventricular assist device) present    Subjective:     Interval History: patient feels great today, ready to discharge     Continuous Infusions:  Scheduled Meds:   amiodarone  200 mg Oral Daily    amLODIPine  5 mg Oral Daily    docusate sodium  200 mg Oral QHS    ferrous gluconate  324 mg Oral Daily with breakfast    furosemide  40 mg Oral BID    hydrALAZINE  10 mg Oral Once    INV aspirin/placebo  81 mg Oral Daily    magnesium oxide  400 mg Oral TID    pantoprazole  40 mg Oral Daily    polyethylene glycol  17 g Oral Daily    potassium chloride  40 mEq Oral Daily    warfarin  7.5 mg Oral Daily     PRN Meds:acetaminophen, albuterol sulfate, bisacodyl, dextrose 50%, dextrose 50%, glucagon (human recombinant), glucose, glucose, insulin aspart U-100, magnesium hydroxide 400 mg/5 ml, oxyCODONE, oxyCODONE    Review of patient's allergies indicates:   Allergen Reactions    Aspirin Other (See Comments)     Prevent II patient- Do NOT order aspirin. Please call Yenny [phone] if patient is admitted to hospital    Lisinopril Anaphylaxis     Objective:     Vital Signs (Most Recent):  Temp: 98.6 °F (37 °C) (03/26/18 0815)  Pulse: 60 (03/26/18 0815)  Resp: 18 (03/26/18 0815)  BP: (!) 82/0 (03/26/18 0815)  SpO2: (!) 94 % (03/26/18 0815) Vital Signs (24h Range):  Temp:  [97.9 °F (36.6 °C)-98.7 °F (37.1 °C)] 98.6 °F (37 °C)  Pulse:  [60-94] 60  Resp:  [16-18] 18  SpO2:  [94 %-95 %] 94 %  BP: ()/(0-73) 82/0     Patient Vitals for the past 72 hrs (Last 3 readings):   Weight   03/26/18 0700 110.8 kg (244 lb 4.3 oz)   03/25/18 0718 110.8 kg (244 lb 4.3 oz)   03/24/18 0700 109.9 kg (242 lb 4.6 oz)     Body mass index is  32.23 kg/m².      Intake/Output Summary (Last 24 hours) at 03/26/18 0958  Last data filed at 03/26/18 0600   Gross per 24 hour   Intake              240 ml   Output              675 ml   Net             -435 ml       Hemodynamic Parameters:           Physical Exam   Constitutional: He appears well-developed and well-nourished. No distress.   HENT:   Head: Normocephalic and atraumatic.   Neck: Normal range of motion. No JVD present.   Cardiovascular: Normal rate.  Exam reveals no friction rub.    No murmur heard.  Normal VAD hum   Pulmonary/Chest: Effort normal. No respiratory distress. He has no wheezes. He has no rales.   Abdominal: Soft. He exhibits no distension.   Musculoskeletal: Normal range of motion. He exhibits no edema.   Neurological: He is alert.   Skin: Skin is warm. Capillary refill takes 2 to 3 seconds. He is not diaphoretic. No erythema.       Significant Labs:  CBC:    Recent Labs  Lab 03/24/18  0701 03/25/18 0413 03/26/18 0459   WBC 11.02 8.71 7.63   RBC 2.99* 2.92* 2.94*   HGB 7.6* 7.6* 7.5*   HCT 25.4* 25.4* 25.3*   * 414* 412*   MCV 85 87 86   MCH 25.4* 26.0* 25.5*   MCHC 29.9* 29.9* 29.6*     BNP:    Recent Labs  Lab 03/21/18  0624 03/23/18  0633 03/26/18  0459   * 744* 817*     CMP:    Recent Labs  Lab 03/21/18  0624  03/23/18  0633 03/24/18  0701 03/25/18 0413 03/26/18  0459     < > 109 84 127* 165*   CALCIUM 9.8  < > 9.6 9.1 9.1 8.9   ALBUMIN 2.8*  --  2.9*  --   --  2.6*   PROT 7.2  --  7.3  --   --  6.6   *  < > 131* 133* 134* 136   K 4.3  4.3  < > 4.0 4.3 4.0 3.9   CO2 28  < > 28 26 24 26   CL 92*  < > 95 98 100 99   BUN 39*  < > 33* 26* 28* 23*   CREATININE 2.2*  < > 1.8* 1.7* 1.8* 1.6*   ALKPHOS 157*  --  153*  --   --  129   ALT 50*  --  46*  --   --  33   AST 47*  --  44*  --   --  32   BILITOT 1.5*  --  1.3*  --   --  1.0   < > = values in this interval not displayed.   Coagulation:     Recent Labs  Lab 03/20/18  1756 03/20/18  2249 03/21/18  0624   03/24/18  0701 03/25/18  0413 03/26/18  0459   INR  --   --  2.0*  < > 2.6* 2.3* 2.4*   APTT 41.2* 43.1* 43.9*  43.9*  --   --   --   --    < > = values in this interval not displayed.  LDH:    Recent Labs  Lab 03/24/18  0701 03/25/18 0413 03/26/18  0459   * 343* 303*     Microbiology:  Microbiology Results (last 7 days)     Procedure Component Value Units Date/Time    Blood culture [[id number]] Collected:  03/23/18 2220    Order Status:  Completed Specimen:  Blood Updated:  03/25/18 2312     Blood Culture, Routine No Growth to date     Blood Culture, Routine No Growth to date     Blood Culture, Routine No Growth to date    Narrative:       Collection has been rescheduled by CAB2 at 3/23/2018 21:47 Reason:   Nurse Shasha is going rescheduled lab for  am collected  Collection has been rescheduled by CAB2 at 3/23/2018 21:47 Reason:   Nurse Shasha is going rescheduled lab for  am collected    Blood culture [[id number]] Collected:  03/23/18 2220    Order Status:  Completed Specimen:  Blood Updated:  03/25/18 2312     Blood Culture, Routine No Growth to date     Blood Culture, Routine No Growth to date     Blood Culture, Routine No Growth to date    Narrative:       Collection has been rescheduled by CAB2 at 3/23/2018 21:47 Reason:   Nurse Shasha is going rescheduled lab for  am collected  Collection has been rescheduled by CAB2 at 3/23/2018 21:47 Reason:   Nurse Shasha is going rescheduled lab for  am collected    Aerobic culture [[id number]] Collected:  03/24/18 0226    Order Status:  Completed Specimen:  Chest Updated:  03/25/18 0738     Aerobic Bacterial Culture No growth    Urine culture [[id number]] Collected:  03/23/18 2233    Order Status:  Completed Specimen:  Urine from Urine, Clean Catch Updated:  03/25/18 0347     Urine Culture, Routine No growth    Blood culture [[id number]]     Order Status:  Canceled Specimen:  Blood     Blood culture [[id number]]     Order Status:  Canceled Specimen:  Blood     Blood  culture [[id number]]     Order Status:  Canceled Specimen:  Blood     Blood culture [[id number]] Collected:  03/15/18 1358    Order Status:  Completed Specimen:  Blood from Peripheral, Hand, Left Updated:  03/20/18 1812     Blood Culture, Routine No growth after 5 days.    Blood culture [[id number]] Collected:  03/15/18 1407    Order Status:  Completed Specimen:  Blood from Peripheral, Antecubital, Left Updated:  03/20/18 1812     Blood Culture, Routine No growth after 5 days.          I have reviewed all pertinent labs within the past 24 hours.    Estimated Creatinine Clearance: 71.3 mL/min (A) (based on SCr of 1.6 mg/dL (H)).    Diagnostic Results:  I have reviewed and interpreted all pertinent imaging results/findings within the past 24 hours.    Assessment and Plan:     Mr. Richards is a 52 y.o. year old Black or  male who has presents as f/u from hospitalization for ADHF after presenting to clinic 1/10/17 as initial consult. advanced surgical options (LVAD/OHT).    This 53 yo BM has had CHF since 2011 at which time an angiogram did not demonstrate any CAD.  He is on amiodarone for VT. He was admitted from 1/12-1/17/18 (see d/c summary) where he was treated for ADHF and initiation of w/u for advanced options. RHC during that admission showed RA 17 and PCWP 35 as well as severely reduced CI 1.6. Though not optimized, he was discharged per his request so as not to lose his job/insurance--see Dr. Hadley's attestation for full details on d/c summary. Since d/c, his Scr has risen from 2.0 on discharge to 2.8 (1/23/18). His BNP had declined to 1040.  He presents today for scheduled hospital f/u.      Today, he reports feeling well. He says he has more energy than usual. His only complaints are cramping and xerosis. He denies n/v/d, no CP, palpitations or syncope. He has stable orthostatic dizziness/LH. No PND or orthopnea. His COLEMAN is improved from discharge and his weight is down about 5-7 pounds on his  home scale. Of note, his Scr on labs from 2 days ago showed Scr 2.8 up from 2.0. T. Bili was down from 1.1 to 0.6 and BNP was down to 1040 from 1700.  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 No labs initially ordered but I ordered and sent him down.     * LVAD (left ventricular assist device) present    -HeartMate II Implanted 3/8/18 as DT with repositioning of outflow graft 3/9/18.  S/p chest closure 3/10/18  -HTS Primary as of today  -Implanted by Dr. Kaufman  -Anticoagulation per Primary Coumadin  -Antiplatelets; Patient is enrolled in PREVENT II  -Speed set at 9000, LSL 8600 rpm  -Not listed for OHTx  - Weaned off Dobutamine and patient looks clinically compensated   -Patient doing well on Lasix po BID         Elevated temperature    -cultures negative  -patient has been afebrile  -no signs of infection        Hypertension    -started Norvasc yesterday  -BP better but still with some elevated readings.  Will give Norvasc another day and if not improved tomorrow increase to 10mg         Pneumothorax    -IR unable to place chest tube or pig tail; Pneumothorax has resolved on CXR        Anemia    -Transfused a unit PRBC's 3/12  -H/H stable  -Iron studies reviewed, in EPIC        Hyperbilirubinemia    -likely secondary to above, trending down  -RUQ ultrasound neg        CKD (chronic kidney disease), stage III    -creatinine stable and at baseline  -Baseline creat 1.6-2.0. (1.6 today)  -appears euvolemic, will monitor creatinine trends.         PVT (paroxysmal ventricular tachycardia)    -has ICD  -Transitioned from IV Amiodarone infusion to po         Acute on chronic combined systolic and diastolic heart failure    -NICM   -Echo prior to VAD- EF: <10%; LVEDD: 9.7 cm  -now s/p HM II LVAD- most recent Echo 3/14/18- LVEDD 8.5.  -Echo stable off Dobutamine   -Euvolemic on exam, will continue po Lasix at current dose BID              Biventricular implantable cardioverter-defibrillator in situ     - in place  - ICD interrogated, event noted on 2/25/18 with appropriate shock for VT  - Continue Amiodarone PO            YANET Harris  Heart Transplant  [facility]

## 2018-03-26 NOTE — PT/OT/SLP PROGRESS
Physical Therapy Treatment and Discharge Summary    Patient Name:  Tim Richards   MRN:  [id number]    Recommendations:     Discharge Recommendations:  home   Discharge Equipment Recommendations: none   Barriers to discharge: Inaccessible home (4 steps to enter house)    Assessment:     Tim Richards is a 52 y.o. male admitted with a medical diagnosis of LVAD (left ventricular assist device) present.  He presents with the following impairments/functional limitations:  impaired endurance, impaired cardiopulmonary response to activity. Pt has met all LTGs and is IND w/ mobility. He will be D/C'd from acute PT at this time..    Rehab Prognosis:  Good; patient would benefit from acute skilled PT services to address these deficits and reach maximum level of function.      Recent Surgery: Procedure(s) (LRB):  CLOSURE-STERNAL WOUND (N/A)  PLACEMENT-NEOPERICARDIUM 16 Days Post-Op    Plan:     During this hospitalization, patient to be seen  (none- D/C) to address the above listed problems via gait training, therapeutic activities  · Plan of Care Expires:  04/11/18   Plan of Care Reviewed with: patient    Subjective     Communicated with nursing prior to session.  Patient found supine upon PT entry to room, agreeable to treatment.      Chief Complaint: none  Patient comments/goals: to return home  Pain/Comfort:  · Pain Rating 1: 0/10    Patients cultural, spiritual, Scientologist conflicts given the current situation: none reported    Objective:     Patient found with: telemetry, LVAD (LVAD to wall power)     General Precautions: Standard, LVAD, fall, sternal   Orthopedic Precautions:N/A   Braces: N/A     Functional Mobility:  · Bed Mobility:     · Supine to Sit: independence  · Transfers:     · Sit to Stand:  independence with no AD  · Gait:   · 500ft w/o AD IND, no LOB  · Balance:   · Static and dynamic standing balance IND  · Stairs:    · Pt declined attempting stairs on this date      AM-PAC 6 CLICK  MOBILITY  Turning over in bed (including adjusting bedclothes, sheets and blankets)?: 4  Sitting down on and standing up from a chair with arms (e.g., wheelchair, bedside commode, etc.): 4  Moving from lying on back to sitting on the side of the bed?: 4  Moving to and from a bed to a chair (including a wheelchair)?: 4  Need to walk in hospital room?: 4  Climbing 3-5 steps with a railing?: 3  Total Score: 23       Therapeutic Activities and Exercises:  Pt managed LVAD w/ overall IND'ce. Pt switched LVAD from wall to battery power w/ IND'ce. Pt donned holster w/ IND'ce. Emergency bag present t/o gait trial. Pt educated on completing self-test and binder paper work at start of session.     Pt educated on the importance of safety and frequent ambulation trials at home. Pt verb understanding.    Patient left walking in egan w/ wife present with all lines intact..    GOALS:    Physical Therapy Goals     Not on file          Multidisciplinary Problems (Resolved)        Problem: Physical Therapy Goal    Goal Priority Disciplines Outcome Goal Variances Interventions   Physical Therapy Goal   (Resolved)     PT/OT, PT Outcome(s) achieved     Description:  Goals to be met by: 3/26     Patient will increase functional independence with mobility by performin. Supine to sit with Stand-by Assistance-MET 3/26/2018  2. Sit to supine with Stand-by Assistance-MET 3/26/2018  3. Sit to stand transfer with Stand-by Assistance - MET 3/16  4. Gait  x 100 feet with Stand-by Assistance - MET 3/17  5. Pt sit to/from stand with supervision- MET 3/20/2018  6. Pt receive gait training ~ 500 ft with supervision and no rest periods- MET 3/26/2018  7. Pt will ascend/descend 4 steps w/ (L) rail for balance, CGA for safety- MET 3/20/2018                     Time Tracking:     PT Received On: 18  PT Start Time: 1000     PT Stop Time: 1008  PT Total Time (min): 8 min     Billable Minutes: Therapeutic Exercise 8 and Total Time 8    Treatment  Type:  (Treatment and D/C)  PT/PTA: PT     PTA Visit Number: 0     Corrine Miller, PT  03/26/2018

## 2018-03-26 NOTE — PLAN OF CARE
Problem: Physical Therapy Goal  Goal: Physical Therapy Goal  Goals to be met by: 3/26     Patient will increase functional independence with mobility by performin. Supine to sit with Stand-by Assistance-MET 3/26/2018  2. Sit to supine with Stand-by Assistance-MET 3/26/2018  3. Sit to stand transfer with Stand-by Assistance - MET 3/16  4. Gait  x 100 feet with Stand-by Assistance - MET 3/17  5. Pt sit to/from stand with supervision- MET 3/20/2018  6. Pt receive gait training ~ 500 ft with supervision and no rest periods- MET 3/26/2018  7. Pt will ascend/descend 4 steps w/ (L) rail for balance, CGA for safety- MET 3/20/2018   Outcome: Outcome(s) achieved Date Met: 18  Pt has met all LTGs. He will be D/C'd from PT.

## 2018-03-26 NOTE — PROGRESS NOTES
Discharge:     SWI and supervisor Spencer to pt room. Pt's wife in pt's room with pt. Pt presents aoox4 with pleasant affect. Pt states understanding of discharge with [facility] Ph: [phone] , fax: [phone]. Pt's wife will transport pt home. Pt reports coping well and denies further needs, questions, concerns at this time and none indicated. Providing ongoing psychosocial and counseling support, education, resources, assistance and discharge planning as indicated. Following and available.

## 2018-03-26 NOTE — SUBJECTIVE & OBJECTIVE
Interval History: patient feels great today, ready to discharge     Continuous Infusions:  Scheduled Meds:   amiodarone  200 mg Oral Daily    amLODIPine  5 mg Oral Daily    docusate sodium  200 mg Oral QHS    ferrous gluconate  324 mg Oral Daily with breakfast    furosemide  40 mg Oral BID    hydrALAZINE  10 mg Oral Once    INV aspirin/placebo  81 mg Oral Daily    magnesium oxide  400 mg Oral TID    pantoprazole  40 mg Oral Daily    polyethylene glycol  17 g Oral Daily    potassium chloride  40 mEq Oral Daily    warfarin  7.5 mg Oral Daily     PRN Meds:acetaminophen, albuterol sulfate, bisacodyl, dextrose 50%, dextrose 50%, glucagon (human recombinant), glucose, glucose, insulin aspart U-100, magnesium hydroxide 400 mg/5 ml, oxyCODONE, oxyCODONE    Review of patient's allergies indicates:   Allergen Reactions    Aspirin Other (See Comments)     Prevent II patient- Do NOT order aspirin. Please call Yenny [phone] if patient is admitted to hospital    Lisinopril Anaphylaxis     Objective:     Vital Signs (Most Recent):  Temp: 98.6 °F (37 °C) (03/26/18 0815)  Pulse: 60 (03/26/18 0815)  Resp: 18 (03/26/18 0815)  BP: (!) 82/0 (03/26/18 0815)  SpO2: (!) 94 % (03/26/18 0815) Vital Signs (24h Range):  Temp:  [97.9 °F (36.6 °C)-98.7 °F (37.1 °C)] 98.6 °F (37 °C)  Pulse:  [60-94] 60  Resp:  [16-18] 18  SpO2:  [94 %-95 %] 94 %  BP: ()/(0-73) 82/0     Patient Vitals for the past 72 hrs (Last 3 readings):   Weight   03/26/18 0700 110.8 kg (244 lb 4.3 oz)   03/25/18 0718 110.8 kg (244 lb 4.3 oz)   03/24/18 0700 109.9 kg (242 lb 4.6 oz)     Body mass index is 32.23 kg/m².      Intake/Output Summary (Last 24 hours) at 03/26/18 0958  Last data filed at 03/26/18 0600   Gross per 24 hour   Intake              240 ml   Output              675 ml   Net             -435 ml       Hemodynamic Parameters:           Physical Exam   Constitutional: He appears well-developed and well-nourished. No distress.   HENT:   Head:  Normocephalic and atraumatic.   Neck: Normal range of motion. No JVD present.   Cardiovascular: Normal rate.  Exam reveals no friction rub.    No murmur heard.  Normal VAD hum   Pulmonary/Chest: Effort normal. No respiratory distress. He has no wheezes. He has no rales.   Abdominal: Soft. He exhibits no distension.   Musculoskeletal: Normal range of motion. He exhibits no edema.   Neurological: He is alert.   Skin: Skin is warm. Capillary refill takes 2 to 3 seconds. He is not diaphoretic. No erythema.       Significant Labs:  CBC:    Recent Labs  Lab 03/24/18  0701 03/25/18 0413 03/26/18  0459   WBC 11.02 8.71 7.63   RBC 2.99* 2.92* 2.94*   HGB 7.6* 7.6* 7.5*   HCT 25.4* 25.4* 25.3*   * 414* 412*   MCV 85 87 86   MCH 25.4* 26.0* 25.5*   MCHC 29.9* 29.9* 29.6*     BNP:    Recent Labs  Lab 03/21/18  0624 03/23/18  0633 03/26/18  0459   * 744* 817*     CMP:    Recent Labs  Lab 03/21/18  0624 03/23/18  0633 03/24/18 0701 03/25/18 0413 03/26/18  0459     < > 109 84 127* 165*   CALCIUM 9.8  < > 9.6 9.1 9.1 8.9   ALBUMIN 2.8*  --  2.9*  --   --  2.6*   PROT 7.2  --  7.3  --   --  6.6   *  < > 131* 133* 134* 136   K 4.3  4.3  < > 4.0 4.3 4.0 3.9   CO2 28  < > 28 26 24 26   CL 92*  < > 95 98 100 99   BUN 39*  < > 33* 26* 28* 23*   CREATININE 2.2*  < > 1.8* 1.7* 1.8* 1.6*   ALKPHOS 157*  --  153*  --   --  129   ALT 50*  --  46*  --   --  33   AST 47*  --  44*  --   --  32   BILITOT 1.5*  --  1.3*  --   --  1.0   < > = values in this interval not displayed.   Coagulation:     Recent Labs  Lab 03/20/18  1756 03/20/18  2249 03/21/18  0624  03/24/18  0701 03/25/18 0413 03/26/18 0459   INR  --   --  2.0*  < > 2.6* 2.3* 2.4*   APTT 41.2* 43.1* 43.9*  43.9*  --   --   --   --    < > = values in this interval not displayed.  LDH:    Recent Labs  Lab 03/24/18  0701 03/25/18 0413 03/26/18 0459   * 343* 303*     Microbiology:  Microbiology Results (last 7 days)     Procedure Component Value  Units Date/Time    Blood culture [[id number]] Collected:  03/23/18 2220    Order Status:  Completed Specimen:  Blood Updated:  03/25/18 2312     Blood Culture, Routine No Growth to date     Blood Culture, Routine No Growth to date     Blood Culture, Routine No Growth to date    Narrative:       Collection has been rescheduled by CAB2 at 3/23/2018 21:47 Reason:   Nurse Shasha is going rescheduled lab for  am collected  Collection has been rescheduled by CAB2 at 3/23/2018 21:47 Reason:   Nurse Shasha is going rescheduled lab for  am collected    Blood culture [[id number]] Collected:  03/23/18 2220    Order Status:  Completed Specimen:  Blood Updated:  03/25/18 2312     Blood Culture, Routine No Growth to date     Blood Culture, Routine No Growth to date     Blood Culture, Routine No Growth to date    Narrative:       Collection has been rescheduled by CAB2 at 3/23/2018 21:47 Reason:   Nurse Shahsa is going rescheduled lab for  am collected  Collection has been rescheduled by CAB2 at 3/23/2018 21:47 Reason:   Nurse Shasha is going rescheduled lab for  am collected    Aerobic culture [[id number]] Collected:  03/24/18 0226    Order Status:  Completed Specimen:  Chest Updated:  03/25/18 0738     Aerobic Bacterial Culture No growth    Urine culture [[id number]] Collected:  03/23/18 2233    Order Status:  Completed Specimen:  Urine from Urine, Clean Catch Updated:  03/25/18 0347     Urine Culture, Routine No growth    Blood culture [[id number]]     Order Status:  Canceled Specimen:  Blood     Blood culture [[id number]]     Order Status:  Canceled Specimen:  Blood     Blood culture [[id number]]     Order Status:  Canceled Specimen:  Blood     Blood culture [[id number]] Collected:  03/15/18 1358    Order Status:  Completed Specimen:  Blood from Peripheral, Hand, Left Updated:  03/20/18 1812     Blood Culture, Routine No growth after 5 days.    Blood culture [[id number]] Collected:  03/15/18 1407    Order Status:  Completed Specimen:   Blood from Peripheral, Antecubital, Left Updated:  03/20/18 1812     Blood Culture, Routine No growth after 5 days.          I have reviewed all pertinent labs within the past 24 hours.    Estimated Creatinine Clearance: 71.3 mL/min (A) (based on SCr of 1.6 mg/dL (H)).    Diagnostic Results:  I have reviewed and interpreted all pertinent imaging results/findings within the past 24 hours.

## 2018-03-26 NOTE — DISCHARGE SUMMARY
[facility]  Heart Transplant  Discharge Summary      Patient Name: Tim Richards  MRN: [id number]  Admission Date: 3/1/2018  Hospital Length of Stay: 25 days  Discharge Date and Time: 03/26/2018 3:07 PM  Attending Physician: Antonio Hadley Jr.,*   Discharging Provider: YANET Harris  Primary Care Provider: Ja Méndez MD     HPI: Mr. Richards is a 52 y.o. year old Black or  male who has presents as f/u from hospitalization for ADHF after presenting to clinic 1/10/17 as initial consult. advanced surgical options (LVAD/OHT).    This 53 yo BM has had CHF since 2011 at which time an angiogram did not demonstrate any CAD.  He is on amiodarone for VT. He was admitted from 1/12-1/17/18 (see d/c summary) where he was treated for ADHF and initiation of w/u for advanced options. RHC during that admission showed RA 17 and PCWP 35 as well as severely reduced CI 1.6. Though not optimized, he was discharged per his request so as not to lose his job/insurance--see Dr. Hadley's attestation for full details on d/c summary. Since d/c, his Scr has risen from 2.0 on discharge to 2.8 (1/23/18). His BNP had declined to 1040.  He presents today for scheduled hospital f/u.      Today, he reports feeling well. He says he has more energy than usual. His only complaints are cramping and xerosis. He denies n/v/d, no CP, palpitations or syncope. He has stable orthostatic dizziness/LH. No PND or orthopnea. His COLEMAN is improved from discharge and his weight is down about 5-7 pounds on his home scale. Of note, his Scr on labs from 2 days ago showed Scr 2.8 up from 2.0. T. Bili was down from 1.1 to 0.6 and BNP was down to 1040 from 1700.  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     Procedure(s) (LRB):  CLOSURE-STERNAL WOUND (N/A)  PLACEMENT-NEOPERICARDIUM     Hospital Course: [facility] [place]. He was placed on  gtt and had decrease in Cr. Workup was finished  and plan was made to move to LVAD. HeartMate II Implanted 3/8/18 as DT with repositioning of outflow graft 3/9/18.  S/p chest closure 3/10/18.  Patient is enrolled in PREVENT II and his speed was set at 9000 rpm. He was weaned off  once on the floor and lasix was transitioned to po BID. He was started on norvasc 5 mg daily with BP controlled between 60-80 Doppler. Cr trended down to 1.6 on discharge and hgb remained stable on oral iron. He will follow up in 3 days in VAD clinic with labs                                                    Consults         Status Ordering Provider     Inpatient consult to Colorectal Surgery  Once     Provider:  (Not yet assigned)    Completed JOSE FAN     Inpatient consult to Endocrinology  Once     Provider:  (Not yet assigned)    Completed ARELY HUGHES     Inpatient consult to Endocrinology  Once     Provider:  (Not yet assigned)    Completed HAJA GREENE     Inpatient consult to Endocrinology  Once     Provider:  (Not yet assigned)    Completed HAJA GREENE     Inpatient consult to Hematology/Oncology  Once     Provider:  (Not yet assigned)    Completed JOSE FAN     Inpatient consult to Interventional Cardiology  Once     Provider:  (Not yet assigned)    Completed JOSE FAN     Inpatient consult to Interventional Radiology  Once     Provider:  (Not yet assigned)    Completed HAJA GREENE     Inpatient consult to Midline team  Once     Provider:  (Not yet assigned)    Completed SHABBIR OJEDA     Inpatient consult to Palliative Care  Once     Provider:  (Not yet assigned)    Completed SHABBIR OJEDA AOrestes     Inpatient consult [facility] team [facility]  Once     Provider:  (Not yet assigned)    Completed MANDCORNELIUS SHABBIR A.     Inpatient consult to PICC team ([facility])  Once     Provider:  (Not yet assigned)    Completed NICO WESTON     Inpatient consult to PICC team ([facility])  Once     Provider:  (Not yet assigned)     Completed HAJA GREENE     Inpatient consult to Registered Dietitian/Nutritionist  Once     Provider:  (Not yet assigned)    Completed ARELY HUGHES     Inpatient consult to Registered Dietitian/Nutritionist  Once     Provider:  (Not yet assigned)    Completed HAJA GREENE          Significant Diagnostic Studies: Labs:   BMP:   Recent Labs  Lab 03/25/18  0413 03/26/18  0459   * 165*   * 136   K 4.0 3.9    99   CO2 24 26   BUN 28* 23*   CREATININE 1.8* 1.6*   CALCIUM 9.1 8.9   MG 2.2 2.1       Pending Diagnostic Studies:     Procedure Component Value Units Date/Time    Basic metabolic panel [[id number]] Collected:  03/07/18 1654    Order Status:  Sent Lab Status:  In process Updated:  03/07/18 1655    Specimen:  Blood from Blood         Final Active Diagnoses:    Diagnosis Date Noted POA    PRINCIPAL PROBLEM:  LVAD (left ventricular assist device) present [Z95.811] 03/08/2018 Not Applicable    Hypertension [I10] 03/25/2018 Unknown    Elevated temperature [R50.9] 03/25/2018 Unknown    Hyperbilirubinemia [E80.6] 03/12/2018 Unknown    Anemia [D64.9] 03/12/2018 Unknown    Pneumothorax [J93.9] 03/12/2018 Unknown    Hyperglycemia [R73.9] 03/08/2018 Clinically Undetermined    CKD (chronic kidney disease), stage III [N18.3] 01/12/2018 Yes    Acute decompensated heart failure [I50.9] 01/12/2018 Yes    PVT (paroxysmal ventricular tachycardia) [I47.2] 01/10/2018 Yes    Acute on chronic combined systolic and diastolic heart failure [I50.43] 09/23/2015 Yes    Biventricular implantable cardioverter-defibrillator in situ [Z95.810] 11/20/2014 Yes    Pulmonary nodule seen on imaging study [R91.1] 05/24/2014 Yes    Chronic systolic heart failure [I50.22]  Yes      Problems Resolved During this Admission:    Diagnosis Date Noted Date Resolved POA    Palliative care encounter [Z51.5] 03/07/2018 03/09/2018 Not Applicable    Goals of care, counseling/discussion [Z71.89]   03/09/2018 Not Applicable    Screening for colon cancer [Z12.11] 03/05/2018 03/09/2018 Not Applicable      Discharged Condition: good    Disposition:   [facility] clinic   Follow Up:  Thursday 3/28/2018  diet normal activity normal     Patient Instructions:   No discharge procedures on file.  Medications:  Reconciled Home Medications:   Current Discharge Medication List      START taking these medications    Details   amLODIPine (NORVASC) 5 MG tablet Take 1 tablet (5 mg total) by mouth once daily.  Qty: 30 tablet, Refills: 11      docusate sodium (COLACE) 100 MG capsule Take 2 capsules (200 mg total) by mouth every evening.  Refills: 0      ferrous gluconate (FERGON) 324 MG tablet Take 1 tablet (324 mg total) by mouth daily with breakfast.  Qty: 30 tablet, Refills: 11      furosemide (LASIX) 40 MG tablet Take 1 tablet (40 mg total) by mouth 2 (two) times daily.  Qty: 60 tablet, Refills: 11      magnesium oxide (MAG-OX) 400 mg tablet Take 1 tablet (400 mg total) by mouth 3 (three) times daily.  Qty: 90 tablet, Refills: 5      oxyCODONE (ROXICODONE) 10 mg Tab immediate release tablet Take 1 tablet (10 mg total) by mouth every 6 (six) hours as needed for Pain.  Qty: 28 tablet, Refills: 0      pantoprazole (PROTONIX) 40 MG tablet Take 1 tablet (40 mg total) by mouth once daily.  Qty: 30 tablet, Refills: 11      warfarin (COUMADIN) 5 MG tablet Take 1 tablets (5mg) by mouth on Tues, Thurs, Sat. Take 1.5 tablets (7.5mg) all other days.  Qty: 45 tablet, Refills: 11         CONTINUE these medications which have CHANGED    Details   allopurinol (ZYLOPRIM) 100 MG tablet Take 1 tablet (100 mg total) by mouth once daily.  Qty: 30 tablet, Refills: 2    Associated Diagnoses: Hyperuricemia      potassium chloride SA (K-DUR,KLOR-CON) 10 MEQ tablet Take 2 tablets (20 mEq total) by mouth once daily.  Qty: 60 tablet, Refills: 3    Associated Diagnoses: Hyperkalemia         CONTINUE these medications which have NOT CHANGED    Details    amiodarone (PACERONE) 200 MG Tab TAKE 1 TABLET (200 MG TOTAL) BY MOUTH ONCE DAILY.  Qty: 30 tablet, Refills: 7      INV aspirin/placebo tablet Take 1 tablet (81 mg total) by mouth once daily. Investigational Medication. Patient Study #: [id number]. Take one tablet by mouth once daily.  Qty: 110 tablet, Refills: 0    Associated Diagnoses: LVAD (left ventricular assist device) present; Examination of participant in clinical trial         STOP taking these medications       bumetanide (BUMEX) 2 MG tablet Comments:   Reason for Stopping:         losartan (COZAAR) 25 MG tablet Comments:   Reason for Stopping:         spironolactone (ALDACTONE) 25 MG tablet Comments:   Reason for Stopping:         ERGOCALCIFEROL, VITAMIN D2, (VITAMIN D ORAL) Comments:   Reason for Stopping:         metOLazone (ZAROXOLYN) 2.5 MG tablet Comments:   Reason for Stopping:         metoprolol succinate (TOPROL-XL) 25 MG 24 hr tablet Comments:   Reason for Stopping:         valsartan (DIOVAN) 80 MG tablet Comments:   Reason for Stopping:         VITAMIN E ACETATE (VITAMIN E ORAL) Comments:   Reason for Stopping:               YANET Harris  Heart Transplant  [facility]

## 2018-03-26 NOTE — PROGRESS NOTES
Pt aaox3 with wife at bed. Plan for discharge today. Pt has no questions at this time. VAD f/u appts and VAD equipment reconciliation, per Jose F Stark, completed on Friday, 3/23.

## 2018-03-27 ENCOUNTER — ANTI-COAG VISIT (OUTPATIENT)
Dept: CARDIOLOGY | Facility: CLINIC | Age: 53
End: 2018-03-27

## 2018-03-27 DIAGNOSIS — Z79.01 LONG TERM (CURRENT) USE OF ANTICOAGULANTS: ICD-10-CM

## 2018-03-27 DIAGNOSIS — Z95.811 LVAD (LEFT VENTRICULAR ASSIST DEVICE) PRESENT: ICD-10-CM

## 2018-03-27 PROBLEM — I10 HYPERTENSION: Status: ACTIVE | Noted: 2018-03-27

## 2018-03-27 PROBLEM — R50.9 ELEVATED TEMPERATURE: Status: ACTIVE | Noted: 2018-03-27

## 2018-03-27 LAB — BACTERIA SPEC AEROBE CULT: NO GROWTH

## 2018-03-27 NOTE — PROGRESS NOTES
Mr. Richards 51 yo male admitted 3/1 and discharged 3/26 after admission for HM II implant on 3/8/18. His PMH is significant for CHF, CKD3, VT s/p ICD placement (on amiodarone prior to vad), gout, HTN. Of note, the patient is on PREVENT II trial - Do NOT order aspirin.       Warfarin management: INR goal 2-3, bridging required. He is on INV aspirin/placebo tablet per PREVENT II trial. Discharged on 5mg Tu/Th/Sa, 7.5mg all other days    S/w wife who is managing medications for patient. She confirmed dose plan. We discussed diet and patient will be avoiding high vit K foods. Calendar updated with recent doses in [facility] for the past week. We will follow up with INR on Thursday with vad clinic

## 2018-03-28 LAB
BACTERIA BLD CULT: NORMAL
BACTERIA BLD CULT: NORMAL

## 2018-03-29 ENCOUNTER — CLINICAL SUPPORT (OUTPATIENT)
Dept: TRANSPLANT | Facility: CLINIC | Age: 53
End: 2018-03-29
Payer: COMMERCIAL

## 2018-03-29 ENCOUNTER — OFFICE VISIT (OUTPATIENT)
Dept: TRANSPLANT | Facility: CLINIC | Age: 53
End: 2018-03-29
Attending: INTERNAL MEDICINE
Payer: COMMERCIAL

## 2018-03-29 ENCOUNTER — ANTI-COAG VISIT (OUTPATIENT)
Dept: CARDIOLOGY | Facility: CLINIC | Age: 53
End: 2018-03-29

## 2018-03-29 ENCOUNTER — LAB VISIT (OUTPATIENT)
Dept: LAB | Facility: HOSPITAL | Age: 53
End: 2018-03-29
Attending: INTERNAL MEDICINE
Payer: COMMERCIAL

## 2018-03-29 VITALS
HEIGHT: 73 IN | TEMPERATURE: 99 F | HEART RATE: 80 BPM | BODY MASS INDEX: 33.13 KG/M2 | WEIGHT: 250 LBS | SYSTOLIC BLOOD PRESSURE: 86 MMHG

## 2018-03-29 DIAGNOSIS — I50.9 HEART FAILURE, UNSPECIFIED HEART FAILURE CHRONICITY, UNSPECIFIED HEART FAILURE TYPE: ICD-10-CM

## 2018-03-29 DIAGNOSIS — Z95.811 HEART REPLACED BY HEART ASSIST DEVICE: ICD-10-CM

## 2018-03-29 DIAGNOSIS — Z95.811 LVAD (LEFT VENTRICULAR ASSIST DEVICE) PRESENT: ICD-10-CM

## 2018-03-29 DIAGNOSIS — Z79.01 LONG TERM (CURRENT) USE OF ANTICOAGULANTS: ICD-10-CM

## 2018-03-29 LAB
ALBUMIN SERPL BCP-MCNC: 3.2 G/DL
ALP SERPL-CCNC: 131 U/L
ALT SERPL W/O P-5'-P-CCNC: 33 U/L
ANION GAP SERPL CALC-SCNC: 8 MMOL/L
AST SERPL-CCNC: 34 U/L
BASOPHILS # BLD AUTO: 0.01 K/UL
BASOPHILS NFR BLD: 0.1 %
BILIRUB DIRECT SERPL-MCNC: 0.8 MG/DL
BILIRUB SERPL-MCNC: 1.1 MG/DL
BNP SERPL-MCNC: 1018 PG/ML
BUN SERPL-MCNC: 21 MG/DL
CALCIUM SERPL-MCNC: 9.5 MG/DL
CHLORIDE SERPL-SCNC: 102 MMOL/L
CO2 SERPL-SCNC: 26 MMOL/L
CREAT SERPL-MCNC: 1.9 MG/DL
CRP SERPL-MCNC: 47.1 MG/L
DIFFERENTIAL METHOD: ABNORMAL
EOSINOPHIL # BLD AUTO: 0.2 K/UL
EOSINOPHIL NFR BLD: 2 %
ERYTHROCYTE [DISTWIDTH] IN BLOOD BY AUTOMATED COUNT: 16.3 %
EST. GFR  (AFRICAN AMERICAN): 46.2 ML/MIN/1.73 M^2
EST. GFR  (NON AFRICAN AMERICAN): 39.9 ML/MIN/1.73 M^2
GLUCOSE SERPL-MCNC: 90 MG/DL
HCT VFR BLD AUTO: 28.9 %
HGB BLD-MCNC: 8.3 G/DL
IMM GRANULOCYTES # BLD AUTO: 0.03 K/UL
IMM GRANULOCYTES NFR BLD AUTO: 0.4 %
INR PPP: 2.1
LDH SERPL L TO P-CCNC: 367 U/L
LYMPHOCYTES # BLD AUTO: 1 K/UL
LYMPHOCYTES NFR BLD: 13.6 %
MAGNESIUM SERPL-MCNC: 2.4 MG/DL
MCH RBC QN AUTO: 25.2 PG
MCHC RBC AUTO-ENTMCNC: 28.7 G/DL
MCV RBC AUTO: 88 FL
MONOCYTES # BLD AUTO: 0.7 K/UL
MONOCYTES NFR BLD: 9.3 %
NEUTROPHILS # BLD AUTO: 5.6 K/UL
NEUTROPHILS NFR BLD: 74.6 %
NRBC BLD-RTO: 0 /100 WBC
PHOSPHATE SERPL-MCNC: 3.1 MG/DL
PLATELET # BLD AUTO: 450 K/UL
PMV BLD AUTO: 9.1 FL
POTASSIUM SERPL-SCNC: 4.1 MMOL/L
PREALB SERPL-MCNC: 22 MG/DL
PROT SERPL-MCNC: 7.6 G/DL
PROTHROMBIN TIME: 20.2 SEC
RBC # BLD AUTO: 3.29 M/UL
SODIUM SERPL-SCNC: 136 MMOL/L
WBC # BLD AUTO: 7.49 K/UL

## 2018-03-29 PROCEDURE — 36415 COLL VENOUS BLD VENIPUNCTURE: CPT

## 2018-03-29 PROCEDURE — 84134 ASSAY OF PREALBUMIN: CPT

## 2018-03-29 PROCEDURE — 83615 LACTATE (LD) (LDH) ENZYME: CPT

## 2018-03-29 PROCEDURE — 83880 ASSAY OF NATRIURETIC PEPTIDE: CPT

## 2018-03-29 PROCEDURE — 99214 OFFICE O/P EST MOD 30 MIN: CPT | Mod: S$GLB,,, | Performed by: INTERNAL MEDICINE

## 2018-03-29 PROCEDURE — 82248 BILIRUBIN DIRECT: CPT

## 2018-03-29 PROCEDURE — 99999 PR PBB SHADOW E&M-EST. PATIENT-LVL III: CPT | Mod: PBBFAC,,, | Performed by: INTERNAL MEDICINE

## 2018-03-29 PROCEDURE — 80053 COMPREHEN METABOLIC PANEL: CPT

## 2018-03-29 PROCEDURE — 85025 COMPLETE CBC W/AUTO DIFF WBC: CPT

## 2018-03-29 PROCEDURE — 83735 ASSAY OF MAGNESIUM: CPT

## 2018-03-29 PROCEDURE — 93750 INTERROGATION VAD IN PERSON: CPT | Mod: S$GLB,,, | Performed by: INTERNAL MEDICINE

## 2018-03-29 PROCEDURE — 85610 PROTHROMBIN TIME: CPT

## 2018-03-29 PROCEDURE — 84100 ASSAY OF PHOSPHORUS: CPT

## 2018-03-29 PROCEDURE — 86140 C-REACTIVE PROTEIN: CPT

## 2018-03-29 RX ORDER — AMLODIPINE BESYLATE 10 MG/1
10 TABLET ORAL DAILY
Qty: 30 TABLET | Refills: 1 | Status: SHIPPED | OUTPATIENT
Start: 2018-03-29 | End: 2018-06-05 | Stop reason: SDUPTHER

## 2018-03-29 NOTE — PROGRESS NOTES
Subjective:   Patient ID:  Tim Richards is a 52 y.o. male who presents for LVAD followup visit.    Implant date:  3/8/18     TXP SACHI INTERROGATIONS 3/29/2018   Type HeartMate II   Flow 6.4   Speed 9000   PI 6.1   Power (Jackson) 6.0   LSL 8600   Pulsatility Intermittent pulse       HPI  Probably nonischemic CHF s/p HeartMate II Implanted 3/8/18 as DT with repositioning of outflow graft 3/9/18.  S/p chest closure 3/10/18.  Patient is enrolled in PREVENT II and his speed was set at 9000 rpm. He was weaned off  once on the floor and lasix was transitioned to po BID . He was started on norvasc 5 mg daily with BP controlled between 60-80 Doppler. Cr trended down to 1.6 on discharge and hgb remained stable on oral iron.   He comes for his first VAD clinic three days after discharge  He is doing well with LVAD. DLES number one with scab / sutures.  Has chest tube sites with sutures.       Echo 3/22/18    1 - HeartMate II LVAD; speed 9000 RPM - the aortic valve opens intermittently.     2 - Severe left ventricular enlargement.     3 - Severely depressed left ventricular systolic function (EF 10-15%).     4 - Right ventricular enlargement with moderately depressed systolic function.     5 - Impaired LV relaxation, normal LAP (grade 1 diastolic dysfunction).     6 - Mild left atrial enlargement.     7 - Mild mitral regurgitation.     Review of Systems   Constitution: Negative for decreased appetite, weight gain and weight loss.   Cardiovascular: Negative for chest pain, dyspnea on exertion, leg swelling, near-syncope, orthopnea and palpitations.   Respiratory: Negative for cough and shortness of breath.    Musculoskeletal: Negative for myalgias.   Gastrointestinal: Negative for jaundice.       Objective:     Doppler: 86 intermittent pulse     Physical Exam   Constitutional: He is oriented to person, place, and time. He appears well-developed and well-nourished. He is active. He is not intubated.   BP (!) 86/0 (BP  "Location: Left arm, Patient Position: Sitting)   Pulse 80   Temp 98.9 °F (37.2 °C) (Oral)   Ht 6' 1" (1.854 m)   Wt 113.4 kg (250 lb)   BMI 32.98 kg/m²      HENT:   Head: Normocephalic and atraumatic. Hair is normal.   Right Ear: External ear normal.   Left Ear: External ear normal.   Nose: Nose normal. No nasal deformity. No epistaxis.  No foreign bodies.   Mouth/Throat: Mucous membranes are normal. Mucous membranes are not cyanotic. No oropharyngeal exudate.   Eyes: Conjunctivae and EOM are normal. Pupils are equal, round, and reactive to light.   Neck: Neck supple. No hepatojugular reflux and no JVD (JVP 8 ) present.   Cardiovascular: Normal rate, regular rhythm, normal heart sounds and normal pulses.  Exam reveals no gallop.    Pulmonary/Chest: Effort normal and breath sounds normal. No apnea and no tachypnea. He is not intubated. No respiratory distress. He exhibits no tenderness.   Abdominal: Soft. Normal appearance and bowel sounds are normal. There is no tenderness. No hernia.   Musculoskeletal: Normal range of motion.   Neurological: He is alert and oriented to person, place, and time. He displays no seizure activity.   Skin: Skin is warm, dry and intact. No rash noted. No pallor.   Psychiatric: He has a normal mood and affect. His speech is normal and behavior is normal. Thought content normal. Cognition and memory are normal.       Lab Results   Component Value Date    WBC 7.49 03/29/2018    HGB 8.3 (L) 03/29/2018    HCT 28.9 (L) 03/29/2018    MCV 88 03/29/2018     (H) 03/29/2018    CO2 26 03/29/2018    CREATININE 1.9 (H) 03/29/2018    CALCIUM 9.5 03/29/2018    ALBUMIN 3.2 (L) 03/29/2018    AST 34 03/29/2018    BNP 1,018 (H) 03/29/2018    ALT 33 03/29/2018     (H) 03/29/2018       Lab Results   Component Value Date    INR 2.1 (H) 03/29/2018    INR 2.4 (H) 03/26/2018    INR 2.3 (H) 03/25/2018       BNP   Date Value Ref Range Status   03/29/2018 1,018 (H) 0 - 99 pg/mL Final     Comment: "     Values of less than 100 pg/ml are consistent with non-CHF populations.   03/26/2018 817 (H) 0 - 99 pg/mL Final     Comment:     Values of less than 100 pg/ml are consistent with non-CHF populations.   03/23/2018 744 (H) 0 - 99 pg/mL Final     Comment:     Values of less than 100 pg/ml are consistent with non-CHF populations.       LD   Date Value Ref Range Status   03/29/2018 367 (H) 110 - 260 U/L Final     Comment:     Results are increased in hemolyzed samples.   03/26/2018 303 (H) 110 - 260 U/L Final     Comment:     Results are increased in hemolyzed samples.   03/25/2018 343 (H) 110 - 260 U/L Final     Comment:     Results are increased in hemolyzed samples.       Labs were reviewed with the patient.    Assessment:      1. Heart replaced by heart assist device    2. Heart failure, unspecified heart failure chronicity, unspecified heart failure type        Plan:   Creatinine a little elevated but similar to discharge This will keep me from starting ACE  Elevated doppler will increase norvasc to 10 qd  Patient is now NYHA class II. BP is controlled.  INR is therapeutic.  VAD interrogation was performed in clinic  Most recent echo report noted in HPI  UPDATED plan /followup noted in patient instruction / followup section in addition to being note below  Any VAD management kits dispensed today medically necessary  Fasting lipid profile, HGB A1C and 2D echo with CFD ordered  Recommend 2 gram sodium restriction and 1500cc fluid restriction.  Encourage physical activity with graded exercise program.  Requested patient to weigh themselves daily, and to notify us if their weight increases by more than 3 lbs in 1 day or 5 lbs in 1 week.       Listed for transplant: No  Needs to be smoke free for six months

## 2018-03-29 NOTE — PROCEDURES
TXP SACHI INTERROGATIONS 3/29/2018 3/25/2018 3/24/2018 3/24/2018 3/23/2018 3/21/2018 3/21/2018   Type HeartMate II - - - - - -   Flow 6.4 - - - - - -   Speed 9000 - - - - - -   PI 6.1 - - - - - -   Power (Jackson) 6.0 - - - - - -   LSL 8600 - - - - - -   Pulsatility Intermittent pulse Intermittent pulse Intermittent pulse Intermittent pulse Intermittent pulse Intermittent pulse Intermittent pulse   }

## 2018-03-29 NOTE — PROGRESS NOTES
Date of Implant with Heartmate II LVAD: 3/8/18      PATIENT ARRIVED IN CLINIC:  Ambulatory   Accompanied by: wife    Vitals  Doppler: 86  PAIN: 4 on 0-10 pain scale, location of pain: ribs, description of pain: soreness  Is patient currently on medications for pain? yes What kind? Oxycodone, relieves pain    VAD Interrogation:  TXP [facility] INTERROGATIONS 3/29/2018   Type HeartMate II   Flow 6.4   Speed 9000   PI 6.1   Power (Jackson) 6.0   LSL 8600   Pulsatility Intermittent pulse       Flow in history: 6-8s  Waveforms: [id number].log    Problems / Issues / Alarms with VAD if any: None noted  Any Equipment Issues: None noted (Refer to  note for complete details)   Emergency Equipment With Patient: Yes    VAD Binder With Patient: yes   Reviewed VAD Numbers In Binder: yes  VAD Sounds: Smooth  Manual & Visual Inspection Of Driveline: No kinks or tears noted     LVAD Dressing/DLES:  Appearance Of Driveline: 1 with scab and sutures  Antibiotics: NO  Frequency of Dressing Changes: daily & soap and water dressing  Stabilization Device In Use: yes, sun securement device     Pt In Need Of Management Kits? Yes - 2 months  It is medically necessary to have VAD management kits in order to prevent infection or to assist in the healing of an infected DLES.    Assessment:   Complaints/reason for visit today:routine  Complaints Of Nausea / Vomiting: None noted  Appearance and Frequency Of Stools: normal and formed without blood & every other day  Color Of Urine: clear/yellow, concentrated  Coping/Depression/Anxiety: coping okay  Sleep Habits: 7 hrs /night  Sleep Aids: None noted  Showering: No     Activity/Exercise: walking 3 times daily   Driving: No.    Labs:    Chemistry        Component Value Date/Time     03/29/2018 1111    K 4.1 03/29/2018 1111     03/29/2018 1111    CO2 26 03/29/2018 1111    BUN 21 (H) 03/29/2018 1111    CREATININE 1.9 (H) 03/29/2018 1111    GLU 90 03/29/2018 1111        Component  Value Date/Time    CALCIUM 9.5 03/29/2018 1111    ALKPHOS 131 03/29/2018 1111    AST 34 03/29/2018 1111    ALT 33 03/29/2018 1111    BILITOT 1.1 (H) 03/29/2018 1111    ESTGFRAFRICA 46.2 (A) 03/29/2018 1111    EGFRNONAA 39.9 (A) 03/29/2018 1111            Magnesium   Date Value Ref Range Status   03/29/2018 2.4 1.6 - 2.6 mg/dL Final       Lab Results   Component Value Date    WBC 7.49 03/29/2018    HGB 8.3 (L) 03/29/2018    HCT 28.9 (L) 03/29/2018    MCV 88 03/29/2018     (H) 03/29/2018       Lab Results   Component Value Date    INR 2.1 (H) 03/29/2018    INR 2.4 (H) 03/26/2018    INR 2.3 (H) 03/25/2018       BNP   Date Value Ref Range Status   03/29/2018 1,018 (H) 0 - 99 pg/mL Final     Comment:     Values of less than 100 pg/ml are consistent with non-CHF populations.   03/26/2018 817 (H) 0 - 99 pg/mL Final     Comment:     Values of less than 100 pg/ml are consistent with non-CHF populations.   03/23/2018 744 (H) 0 - 99 pg/mL Final     Comment:     Values of less than 100 pg/ml are consistent with non-CHF populations.       LD   Date Value Ref Range Status   03/29/2018 367 (H) 110 - 260 U/L Final     Comment:     Results are increased in hemolyzed samples.   03/26/2018 303 (H) 110 - 260 U/L Final     Comment:     Results are increased in hemolyzed samples.   03/25/2018 343 (H) 110 - 260 U/L Final     Comment:     Results are increased in hemolyzed samples.       Labs reviewed with patient: YES      Patient Satisfaction Survey Completed by Patient: no  (explained about signature and box to check)  Medication reconciliation: per MA.  Purple card updated today: yes  Coumadin Managed by: [facility]    Education: Reviewed driveline care, emergency procedures, how to change the controller, alarms with patient, as well as discussed how to page the VAD coordinator in case of an emergency.      Plans/Needs: Pt doing well since discharge on Monday. Pt reports that urine is concentrated, BNP 1018, Cr 1.9.  Pt reports not taking in much water- will monitor intake better. Kelly, RN with [facility], saw pt to eval for chest tube suture removal. Will leave in until f/u next week, 4/5/18. Doppler 86 with MAPs in the 80s at home. Plan to increase Norvasc to 10mg daily. See MD note.    Hurricane Season: No

## 2018-03-29 NOTE — LETTER
March 29, 2018        Nader Chiu  [address]  [address]  [place] [address]  Phone: [phone]  Fax: [phone]             [facility] [address] [place] [address]  Phone: [phone]   Patient: Tim Richards   MR Number: [id number]   YOB: 1965   Date of Visit: 3/29/2018       Dear Dr. Nader Chiu    Thank you for referring Tim Richards to me for evaluation. Attached you will find relevant portions of my assessment and plan of care.    If you have questions, please do not hesitate to call me. I look forward to following Tim Richards along with you.    Sincerely,    Bettye Connors MD    Enclosure    If you would like to receive this communication electronically, please contact [email or web address] or [phone] to request [employer] access.    [employer] is a tool which provides read-only access to select patient information with whom you have a relationship. Its easy to use and provides real time access to review your patients record including encounter summaries, notes, results, and demographic information.    If you feel you have received this communication in error or would no longer like to receive these types of communications, please e-mail [email or web address]

## 2018-03-29 NOTE — PROGRESS NOTES
SW followed up with pt and wife today in LVAD clinic. Both were alert/oriented x4 and pleasant.    This is pt's first clinic visit following admission for LVAD placement.     Pt has HH, but will be discontinuing it as he no longer needs it. Pt states he already gets his labs outpatient.    Pt states it is a big adjustment, and that he now has to think about everything before he does it, but feels he is getting more used to it.    No concerns voiced, pt coping adequately and is feeling well physically and emotionally.    SW provided support, resources and education. SW continues to follow and remains available.

## 2018-04-03 ENCOUNTER — TELEPHONE (OUTPATIENT)
Dept: TRANSPLANT | Facility: CLINIC | Age: 53
End: 2018-04-03

## 2018-04-03 NOTE — TELEPHONE ENCOUNTER
"Received call from pt requesting to know if he can be given a release to work from home "just computer stuff, no lifting".   Informed Robyn VAD coordinator, of pt request.   This will be addressed at clinic visit this week.     Pt informed.   "

## 2018-04-03 NOTE — PROGRESS NOTES
Pt missed labs 4/2 stated his wife was at work and unable to bring him. Pt r/s to 4/5 with other vad appts.  Pt stated Mon & Tues his wife is unable to bring him

## 2018-04-05 ENCOUNTER — LAB VISIT (OUTPATIENT)
Dept: LAB | Facility: HOSPITAL | Age: 53
End: 2018-04-05
Attending: INTERNAL MEDICINE
Payer: COMMERCIAL

## 2018-04-05 ENCOUNTER — ANTI-COAG VISIT (OUTPATIENT)
Dept: CARDIOLOGY | Facility: CLINIC | Age: 53
End: 2018-04-05

## 2018-04-05 ENCOUNTER — CLINICAL SUPPORT (OUTPATIENT)
Dept: TRANSPLANT | Facility: CLINIC | Age: 53
End: 2018-04-05
Payer: COMMERCIAL

## 2018-04-05 ENCOUNTER — OFFICE VISIT (OUTPATIENT)
Dept: TRANSPLANT | Facility: CLINIC | Age: 53
End: 2018-04-05
Payer: COMMERCIAL

## 2018-04-05 ENCOUNTER — RESEARCH ENCOUNTER (OUTPATIENT)
Dept: RESEARCH | Facility: HOSPITAL | Age: 53
End: 2018-04-05

## 2018-04-05 VITALS — BODY MASS INDEX: 33.78 KG/M2 | HEART RATE: 88 BPM | SYSTOLIC BLOOD PRESSURE: 82 MMHG | WEIGHT: 256 LBS

## 2018-04-05 DIAGNOSIS — F17.210 CIGARETTE NICOTINE DEPENDENCE WITHOUT COMPLICATION: ICD-10-CM

## 2018-04-05 DIAGNOSIS — N18.30 CKD (CHRONIC KIDNEY DISEASE), STAGE III: ICD-10-CM

## 2018-04-05 DIAGNOSIS — Z95.811 LVAD (LEFT VENTRICULAR ASSIST DEVICE) PRESENT: Primary | ICD-10-CM

## 2018-04-05 DIAGNOSIS — Z95.811 LVAD (LEFT VENTRICULAR ASSIST DEVICE) PRESENT: ICD-10-CM

## 2018-04-05 DIAGNOSIS — Z79.01 LONG TERM (CURRENT) USE OF ANTICOAGULANTS: ICD-10-CM

## 2018-04-05 DIAGNOSIS — D50.8 IRON DEFICIENCY ANEMIA SECONDARY TO INADEQUATE DIETARY IRON INTAKE: ICD-10-CM

## 2018-04-05 DIAGNOSIS — I42.0 DILATED CARDIOMYOPATHY: ICD-10-CM

## 2018-04-05 DIAGNOSIS — I10 HYPERTENSION, UNSPECIFIED TYPE: ICD-10-CM

## 2018-04-05 DIAGNOSIS — I50.9 HEART FAILURE, UNSPECIFIED HEART FAILURE CHRONICITY, UNSPECIFIED HEART FAILURE TYPE: ICD-10-CM

## 2018-04-05 DIAGNOSIS — Z95.811 HEART REPLACED BY HEART ASSIST DEVICE: ICD-10-CM

## 2018-04-05 LAB
ALBUMIN SERPL BCP-MCNC: 3.1 G/DL
ALP SERPL-CCNC: 130 U/L
ALT SERPL W/O P-5'-P-CCNC: 24 U/L
ANION GAP SERPL CALC-SCNC: 8 MMOL/L
AST SERPL-CCNC: 27 U/L
BASOPHILS # BLD AUTO: 0.02 K/UL
BASOPHILS NFR BLD: 0.4 %
BILIRUB DIRECT SERPL-MCNC: 0.7 MG/DL
BILIRUB SERPL-MCNC: 1 MG/DL
BNP SERPL-MCNC: 1310 PG/ML
BUN SERPL-MCNC: 15 MG/DL
CALCIUM SERPL-MCNC: 9.4 MG/DL
CHLORIDE SERPL-SCNC: 105 MMOL/L
CO2 SERPL-SCNC: 28 MMOL/L
CREAT SERPL-MCNC: 1.6 MG/DL
CRP SERPL-MCNC: 35 MG/L
DIFFERENTIAL METHOD: ABNORMAL
EOSINOPHIL # BLD AUTO: 0.2 K/UL
EOSINOPHIL NFR BLD: 3.1 %
ERYTHROCYTE [DISTWIDTH] IN BLOOD BY AUTOMATED COUNT: 16.9 %
EST. GFR  (AFRICAN AMERICAN): 56.8 ML/MIN/1.73 M^2
EST. GFR  (NON AFRICAN AMERICAN): 49.2 ML/MIN/1.73 M^2
GLUCOSE SERPL-MCNC: 100 MG/DL
HCT VFR BLD AUTO: 29.2 %
HGB BLD-MCNC: 8.2 G/DL
IMM GRANULOCYTES # BLD AUTO: 0.02 K/UL
IMM GRANULOCYTES NFR BLD AUTO: 0.4 %
INR PPP: 1.9
LDH SERPL L TO P-CCNC: 314 U/L
LYMPHOCYTES # BLD AUTO: 1 K/UL
LYMPHOCYTES NFR BLD: 18.7 %
MAGNESIUM SERPL-MCNC: 2.3 MG/DL
MCH RBC QN AUTO: 25.1 PG
MCHC RBC AUTO-ENTMCNC: 28.1 G/DL
MCV RBC AUTO: 89 FL
MONOCYTES # BLD AUTO: 0.6 K/UL
MONOCYTES NFR BLD: 10.3 %
NEUTROPHILS # BLD AUTO: 3.7 K/UL
NEUTROPHILS NFR BLD: 67.1 %
NRBC BLD-RTO: 0 /100 WBC
PHOSPHATE SERPL-MCNC: 3.7 MG/DL
PLATELET # BLD AUTO: 308 K/UL
PMV BLD AUTO: 9.3 FL
POTASSIUM SERPL-SCNC: 4 MMOL/L
PREALB SERPL-MCNC: 21 MG/DL
PROT SERPL-MCNC: 7.2 G/DL
PROTHROMBIN TIME: 18.9 SEC
RBC # BLD AUTO: 3.27 M/UL
SODIUM SERPL-SCNC: 141 MMOL/L
WBC # BLD AUTO: 5.55 K/UL

## 2018-04-05 PROCEDURE — 93750 INTERROGATION VAD IN PERSON: CPT | Mod: S$GLB,,, | Performed by: INTERNAL MEDICINE

## 2018-04-05 PROCEDURE — 83735 ASSAY OF MAGNESIUM: CPT

## 2018-04-05 PROCEDURE — 83880 ASSAY OF NATRIURETIC PEPTIDE: CPT

## 2018-04-05 PROCEDURE — 80053 COMPREHEN METABOLIC PANEL: CPT

## 2018-04-05 PROCEDURE — 85025 COMPLETE CBC W/AUTO DIFF WBC: CPT

## 2018-04-05 PROCEDURE — 85610 PROTHROMBIN TIME: CPT

## 2018-04-05 PROCEDURE — 36415 COLL VENOUS BLD VENIPUNCTURE: CPT

## 2018-04-05 PROCEDURE — 84100 ASSAY OF PHOSPHORUS: CPT

## 2018-04-05 PROCEDURE — 86140 C-REACTIVE PROTEIN: CPT

## 2018-04-05 PROCEDURE — 99999 PR PBB SHADOW E&M-EST. PATIENT-LVL III: CPT | Mod: PBBFAC,,, | Performed by: INTERNAL MEDICINE

## 2018-04-05 PROCEDURE — 83615 LACTATE (LD) (LDH) ENZYME: CPT

## 2018-04-05 PROCEDURE — 82248 BILIRUBIN DIRECT: CPT

## 2018-04-05 PROCEDURE — 99214 OFFICE O/P EST MOD 30 MIN: CPT | Mod: S$GLB,,, | Performed by: INTERNAL MEDICINE

## 2018-04-05 PROCEDURE — 84134 ASSAY OF PREALBUMIN: CPT

## 2018-04-05 NOTE — LETTER
April 5, 2018        Nader Chiu  [address]  [address]  [place] [address]  Phone: [phone]  Fax: [phone]             [facility] [address] [place] [address]  Phone: [phone]   Patient: Tim Richards   MR Number: [id number]   YOB: 1965   Date of Visit: 4/5/2018       Dear Dr. Nader Chiu    Thank you for referring Tim Richards to me for evaluation. Attached you will find relevant portions of my assessment and plan of care.    If you have questions, please do not hesitate to call me. I look forward to following Tim Richards along with you.    Sincerely,    Amy Rhodes MD    Enclosure    If you would like to receive this communication electronically, please contact [email or web address] or [phone] to request [employer] access.    [employer] Link is a tool which provides read-only access to select patient information with whom you have a relationship. Its easy to use and provides real time access to review your patients record including encounter summaries, notes, results, and demographic information.    If you feel you have received this communication in error or would no longer like to receive these types of communications, please e-mail [email or web address]

## 2018-04-05 NOTE — PROCEDURES
TXP SACHI INTERROGATIONS 4/5/2018 3/29/2018 3/25/2018 3/24/2018 3/24/2018 3/23/2018 3/21/2018   Type HeartMate II HeartMate II - - - - -   Flow 6.85 6.4 - - - - -   Speed 9000 9000 - - - - -   PI 5.4 6.1 - - - - -   Power (Jackson) 6.0 6.0 - - - - -   LSL 8600 8600 - - - - -   Pulsatility Pulse Intermittent pulse Intermittent pulse Intermittent pulse Intermittent pulse Intermittent pulse Intermittent pulse   }

## 2018-04-05 NOTE — PROGRESS NOTES
Patient and wife questioned. Confirmed incorrect dose 5 mg sun,tue, and 7.5 mg all other days.  Pt missed 4/4 dose.  Denies any greens intake , changes in medication or etc

## 2018-04-05 NOTE — PROGRESS NOTES
"Date of Implant with Heartmate II LVAD: 3/8/16    PATIENT ARRIVED IN CLINIC:  Ambulatory  Accompanied by: Wife    Vitals  Doppler: 82/0  PAIN: 0 on 0-10 pain scale, location of pain: 0, description of pain: 0  Is patient currently on medications for pain? no What kind?    VAD Interrogation:  TXP [facility] INTERROGATIONS 4/5/2018   Type HeartMate II   Flow 6.85   Speed 9000   PI 5.4   Power (Jackson) 6.0   LSL 8600   Pulsatility Pulse       Flow in history:  Waveforms: [id number].log    Problems / Issues / Alarms with VAD if any: None noted  Any Equipment Issues: None noted (Refer to  note for complete details)   Emergency Equipment With Patient: Yes    VAD Binder With Patient: yes   Reviewed VAD Numbers In Binder: yes  VAD Sounds: Smooth  Manual & Visual Inspection Of Driveline: No kinks or tears noted     LVAD Dressing/DLES:  Appearance Of Driveline:  "1" sutures removed and chest tube sutures removed  Antibiotics: NO  Frequency of Dressing Changes: daily & soap and water dressing  Stabilization Device In Use: yes, sun securement device     Pt In Need Of Management Kits? Yes- 7.5 gloves fall off wifes hands. Will order smaller sterile gloves.   It is medically necessary to have VAD management kits in order to prevent infection or to assist in the healing of an infected DLES.    Assessment:   Complaints/reason for visit today:routine  Complaints Of Nausea / Vomiting: None noted  Appearance and Frequency Of Stools: normal and formed without blood & daily  Color Of Urine: clear/yellow  Coping/Depression/Anxiety: coping okay  Sleep Habits: 8 hrs /night  Sleep Aids: None noted  Showering: No     Activity/Exercise: Walking   Driving: No.    Labs:    Chemistry        Component Value Date/Time     04/05/2018 0727    K 4.0 04/05/2018 0727     04/05/2018 0727    CO2 28 04/05/2018 0727    BUN 15 04/05/2018 0727    CREATININE 1.6 (H) 04/05/2018 0727     04/05/2018 0727        Component Value " Date/Time    CALCIUM 9.4 04/05/2018 0727    ALKPHOS 130 04/05/2018 0727    AST 27 04/05/2018 0727    ALT 24 04/05/2018 0727    BILITOT 1.0 04/05/2018 0727    ESTGFRAFRICA 56.8 (A) 04/05/2018 0727    EGFRNONAA 49.2 (A) 04/05/2018 0727            Magnesium   Date Value Ref Range Status   04/05/2018 2.3 1.6 - 2.6 mg/dL Final       Lab Results   Component Value Date    WBC 5.55 04/05/2018    HGB 8.2 (L) 04/05/2018    HCT 29.2 (L) 04/05/2018    MCV 89 04/05/2018     04/05/2018       Lab Results   Component Value Date    INR 1.9 (H) 04/05/2018    INR 2.1 (H) 03/29/2018    INR 2.4 (H) 03/26/2018       BNP   Date Value Ref Range Status   04/05/2018 1,310 (H) 0 - 99 pg/mL Final     Comment:     Values of less than 100 pg/ml are consistent with non-CHF populations.   03/29/2018 1,018 (H) 0 - 99 pg/mL Final     Comment:     Values of less than 100 pg/ml are consistent with non-CHF populations.   03/26/2018 817 (H) 0 - 99 pg/mL Final     Comment:     Values of less than 100 pg/ml are consistent with non-CHF populations.       LD   Date Value Ref Range Status   04/05/2018 314 (H) 110 - 260 U/L Final     Comment:     Results are increased in hemolyzed samples.   03/29/2018 367 (H) 110 - 260 U/L Final     Comment:     Results are increased in hemolyzed samples.   03/26/2018 303 (H) 110 - 260 U/L Final     Comment:     Results are increased in hemolyzed samples.       Labs reviewed with patient: YES     Patient Satisfaction Survey Completed by Patient: no  (explained about signature and box to check)  Medication reconciliation: per MA.  Purple card updated today: yes  [facility]    Education: Reviewed driveline care, emergency procedures, how to change the controller, alarms with patient, as well as discussed how to page the VAD coordinator in case of an emergency.      Plans/Needs:  RTC in one week with echo. Will increase Lasix 80mg in AM and 40mg for three days. Will do repeat labs Monday  morning.     Hurricane Season: No

## 2018-04-09 ENCOUNTER — TELEPHONE (OUTPATIENT)
Dept: TRANSPLANT | Facility: CLINIC | Age: 53
End: 2018-04-09

## 2018-04-11 ENCOUNTER — NURSE TRIAGE (OUTPATIENT)
Dept: ADMINISTRATIVE | Facility: CLINIC | Age: 53
End: 2018-04-11

## 2018-04-11 NOTE — TELEPHONE ENCOUNTER
Scheduled for an echo tomorrow. Has gout really bad and is unable to get out of bed. Wanting to send a message to Dr. Connors to order a new medication for the gout so that he can make the appt. Please contact wife about the medication.    Reason for Disposition   Caller requesting a NON-URGENT new prescription or refill and triager unable to refill per unit policy    Protocols used: ST MEDICATION QUESTION CALL-A-AH

## 2018-04-12 ENCOUNTER — LAB VISIT (OUTPATIENT)
Dept: LAB | Facility: HOSPITAL | Age: 53
End: 2018-04-12
Attending: INTERNAL MEDICINE
Payer: COMMERCIAL

## 2018-04-12 ENCOUNTER — OFFICE VISIT (OUTPATIENT)
Dept: TRANSPLANT | Facility: CLINIC | Age: 53
End: 2018-04-12
Attending: INTERNAL MEDICINE
Payer: COMMERCIAL

## 2018-04-12 ENCOUNTER — ANTI-COAG VISIT (OUTPATIENT)
Dept: CARDIOLOGY | Facility: CLINIC | Age: 53
End: 2018-04-12

## 2018-04-12 ENCOUNTER — HOSPITAL ENCOUNTER (OUTPATIENT)
Dept: CARDIOLOGY | Facility: CLINIC | Age: 53
Discharge: HOME | End: 2018-04-12
Attending: INTERNAL MEDICINE
Payer: COMMERCIAL

## 2018-04-12 VITALS — BODY MASS INDEX: 32.74 KG/M2 | WEIGHT: 247 LBS | HEIGHT: 73 IN | SYSTOLIC BLOOD PRESSURE: 86 MMHG | TEMPERATURE: 98 F

## 2018-04-12 DIAGNOSIS — I50.9 HEART FAILURE, UNSPECIFIED HEART FAILURE CHRONICITY, UNSPECIFIED HEART FAILURE TYPE: ICD-10-CM

## 2018-04-12 DIAGNOSIS — Z95.811 LVAD (LEFT VENTRICULAR ASSIST DEVICE) PRESENT: ICD-10-CM

## 2018-04-12 DIAGNOSIS — Z95.811 HEART REPLACED BY HEART ASSIST DEVICE: ICD-10-CM

## 2018-04-12 DIAGNOSIS — Z79.01 LONG TERM (CURRENT) USE OF ANTICOAGULANTS: ICD-10-CM

## 2018-04-12 LAB
ALBUMIN SERPL BCP-MCNC: 3.1 G/DL
ALP SERPL-CCNC: 123 U/L
ALT SERPL W/O P-5'-P-CCNC: 15 U/L
ANION GAP SERPL CALC-SCNC: 12 MMOL/L
AST SERPL-CCNC: 20 U/L
BILIRUB SERPL-MCNC: 0.8 MG/DL
BNP SERPL-MCNC: 1454 PG/ML
BUN SERPL-MCNC: 15 MG/DL
CALCIUM SERPL-MCNC: 9.9 MG/DL
CHLORIDE SERPL-SCNC: 100 MMOL/L
CO2 SERPL-SCNC: 28 MMOL/L
CREAT SERPL-MCNC: 1.8 MG/DL
EST. GFR  (AFRICAN AMERICAN): 49.3 ML/MIN/1.73 M^2
EST. GFR  (NON AFRICAN AMERICAN): 42.6 ML/MIN/1.73 M^2
ESTIMATED PA SYSTOLIC PRESSURE: 46.58
GLUCOSE SERPL-MCNC: 121 MG/DL
INR PPP: 4
MAGNESIUM SERPL-MCNC: 2.3 MG/DL
MITRAL VALVE MOBILITY: ABNORMAL
MITRAL VALVE REGURGITATION: ABNORMAL
POTASSIUM SERPL-SCNC: 4.3 MMOL/L
PROT SERPL-MCNC: 7.9 G/DL
PROTHROMBIN TIME: 39.3 SEC
RETIRED EF AND QEF - SEE NOTES: 8 (ref 55–65)
SODIUM SERPL-SCNC: 140 MMOL/L
TRICUSPID VALVE REGURGITATION: ABNORMAL

## 2018-04-12 PROCEDURE — 99214 OFFICE O/P EST MOD 30 MIN: CPT | Mod: S$GLB,,, | Performed by: INTERNAL MEDICINE

## 2018-04-12 PROCEDURE — 93306 TTE W/DOPPLER COMPLETE: CPT | Mod: S$GLB,,, | Performed by: INTERNAL MEDICINE

## 2018-04-12 PROCEDURE — 85610 PROTHROMBIN TIME: CPT

## 2018-04-12 PROCEDURE — 83735 ASSAY OF MAGNESIUM: CPT

## 2018-04-12 PROCEDURE — 80053 COMPREHEN METABOLIC PANEL: CPT

## 2018-04-12 PROCEDURE — 99999 PR PBB SHADOW E&M-EST. PATIENT-LVL III: CPT | Mod: PBBFAC,,, | Performed by: INTERNAL MEDICINE

## 2018-04-12 PROCEDURE — 93750 INTERROGATION VAD IN PERSON: CPT | Mod: S$GLB,,, | Performed by: INTERNAL MEDICINE

## 2018-04-12 PROCEDURE — 83880 ASSAY OF NATRIURETIC PEPTIDE: CPT

## 2018-04-12 PROCEDURE — 36415 COLL VENOUS BLD VENIPUNCTURE: CPT

## 2018-04-12 NOTE — LETTER
April 12, 2018        Nader Chiu  [address]  [address]  [place] [address]  Phone: [phone]  Fax: [phone]             [facility] [address] [place] [address]  Phone: [phone]   Patient: Tim Richards   MR Number: [id number]   YOB: 1965   Date of Visit: 4/12/2018       Dear Dr. Nader Chiu    Thank you for referring Tim Richards to me for evaluation. Attached you will find relevant portions of my assessment and plan of care.    If you have questions, please do not hesitate to call me. I look forward to following Tim Richards along with you.    Sincerely,    Bettye Connors MD    Enclosure    If you would like to receive this communication electronically, please contact [email or web address] or [phone] to request [employer] Link access.    [employer] Link is a tool which provides read-only access to select patient information with whom you have a relationship. Its easy to use and provides real time access to review your patients record including encounter summaries, notes, results, and demographic information.    If you feel you have received this communication in error or would no longer like to receive these types of communications, please e-mail [email or web address]

## 2018-04-12 NOTE — PROCEDURES
TXP SACHI INTERROGATIONS 4/12/2018 4/5/2018 3/29/2018 3/25/2018 3/24/2018 3/24/2018 3/23/2018   Type HeartMate II HeartMate II HeartMate II - - - -   Flow 6.0 6.85 6.4 - - - -   Speed 9000 9000 9000 - - - -   PI 5.9 5.4 6.1 - - - -   Power (Jackson) 5.7 6.0 6.0 - - - -   LSL 8600 8600 8600 - - - -   Pulsatility Intermittent pulse Pulse Intermittent pulse Intermittent pulse Intermittent pulse Intermittent pulse Intermittent pulse   }

## 2018-04-12 NOTE — PROGRESS NOTES
Subjective:   Patient ID:  Tim Richards is a 52 y.o. male who presents for LVAD followup visit.    Implant Date: 3/8/2018  VAD Type : HM II   VAD speed is at  9000 rpm.   Implanted for : DT , Declined for OHT due to:  Recent tobacco and alcohol      No VAD alarms despite occasion highish flows of 7 to 9 (power was in normal range)  Doppler 86 did not take amolidipine 10 this am prior to visit  Map is 68   DLES is a 2    TXP SACHI INTERROGATIONS 4/12/2018   Type HeartMate II   Flow 6.0   Speed 9000   PI 5.9   Power (Jackson) 5.7   LSL 8600   Pulsatility Intermittent pulse       HPI Probably nonischemic CHF s/p HeartMate II Implanted 3/8/18 as DT with repositioning of outflow graft 3/9/18.  S/p chest closure 3/10/18.  Patient is enrolled in PREVENT II and his speed was set at 9000 rpm.  At last visit, he was volumed overloaded so his lasix was increased to 80 / 40.  Few days later he got gout in his knee for which he started prednisone  Since that time his gout is better  Today his edema has resolved and his weight is four kg lower.  Comes in wheelchair to clinic due to gout    Echo 4/12/18    1 - Moderate left ventricular enlargement.     2 - Severely depressed left ventricular systolic function (EF <10%). LVEDD 8.6    3 - Biatrial enlargement.     4 - Eccentric hypertrophy.     5 - Moderately depressed right ventricular systolic function .     6 - Moderate to severe mitral regurgitation.     7 - Mild tricuspid regurgitation.     8 - Increased central venous pressure.     9 - Pulmonary hypertension. The estimated PA systolic pressure is 47 mmHg.     10 - HeartMate II LVAD; speed 9000.     Review of Systems   Constitution: Negative for decreased appetite, weight gain and weight loss.   Cardiovascular: Negative for chest pain, dyspnea on exertion, leg swelling, near-syncope, orthopnea and palpitations.   Respiratory: Negative for cough and shortness of breath.    Musculoskeletal: Negative for myalgias.  "  Gastrointestinal: Negative for jaundice.       Objective:       Physical Exam   Constitutional: He is oriented to person, place, and time. He appears well-developed and well-nourished. He is active. He is not intubated.   BP (!) 86/0 (BP Location: Right arm, Patient Position: Sitting, BP Method: Medium (Manual)) Comment: doppler  Temp 98.4 °F (36.9 °C) (Oral)   Ht 6' 1" (1.854 m)   Wt 112 kg (247 lb)   BMI 32.59 kg/m²      HENT:   Head: Normocephalic and atraumatic. Hair is normal.   Right Ear: External ear normal.   Left Ear: External ear normal.   Nose: Nose normal. No nasal deformity. No epistaxis.  No foreign bodies.   Mouth/Throat: Mucous membranes are normal. Mucous membranes are not cyanotic. No oropharyngeal exudate.   Eyes: Conjunctivae and EOM are normal. Pupils are equal, round, and reactive to light.   Neck: Neck supple. No hepatojugular reflux and no JVD present.   Cardiovascular: Normal rate, regular rhythm, normal heart sounds and normal pulses.  Exam reveals no gallop.    Pulmonary/Chest: Effort normal and breath sounds normal. No apnea and no tachypnea. He is not intubated. No respiratory distress. He exhibits no tenderness.   Abdominal: Soft. Normal appearance and bowel sounds are normal. There is no tenderness. No hernia.   Musculoskeletal: Normal range of motion.   Neurological: He is alert and oriented to person, place, and time. He displays no seizure activity.   Skin: Skin is warm, dry and intact. No rash noted. No pallor.   Psychiatric: He has a normal mood and affect. His speech is normal and behavior is normal. Thought content normal. Cognition and memory are normal.       Lab Results   Component Value Date    WBC 5.55 04/05/2018    HGB 8.2 (L) 04/05/2018    HCT 29.2 (L) 04/05/2018    MCV 89 04/05/2018     04/05/2018    CO2 28 04/12/2018    CREATININE 1.8 (H) 04/12/2018    CALCIUM 9.9 04/12/2018    ALBUMIN 3.1 (L) 04/12/2018    AST 20 04/12/2018    BNP 1,454 (H) 04/12/2018    " ALT 15 04/12/2018     (H) 04/05/2018       Lab Results   Component Value Date    INR 4.0 (H) 04/12/2018    INR 1.9 (H) 04/05/2018    INR 2.1 (H) 03/29/2018       BNP   Date Value Ref Range Status   04/12/2018 1,454 (H) 0 - 99 pg/mL Final     Comment:     Values of less than 100 pg/ml are consistent with non-CHF populations.   04/05/2018 1,310 (H) 0 - 99 pg/mL Final     Comment:     Values of less than 100 pg/ml are consistent with non-CHF populations.   03/29/2018 1,018 (H) 0 - 99 pg/mL Final     Comment:     Values of less than 100 pg/ml are consistent with non-CHF populations.       LD   Date Value Ref Range Status   04/05/2018 314 (H) 110 - 260 U/L Final     Comment:     Results are increased in hemolyzed samples.   03/29/2018 367 (H) 110 - 260 U/L Final     Comment:     Results are increased in hemolyzed samples.   03/26/2018 303 (H) 110 - 260 U/L Final     Comment:     Results are increased in hemolyzed samples.         Assessment:      1. Heart replaced by heart assist device    2. Heart failure, unspecified heart failure chronicity, unspecified heart failure type        Plan:   With edema improving will decrease lasix to 40 BID   . BP is controlled.  INR is upper range - coumadin to adjust.  Will return next week  after he takes BP meds / off prednisone by them.  Suspect we will be able to increase speed to 9200   VAD interrogation was performed in clinic  Most recent echo report noted in HPI  UPDATED plan /followup noted in patient instruction / followup section in addition to being note below  Any VAD management kits dispensed today medically necessary  Recommend 2 gram sodium restriction and 1500cc fluid restriction.  Encourage physical activity with graded exercise program.  Requested patient to weigh themselves daily, and to notify us if their weight increases by more than 3 lbs in 1 day or 5 lbs in 1 week.     Listed for transplant: No

## 2018-04-12 NOTE — PROGRESS NOTES
Wife questioned and confirmed correct dose.   Mild bruising on right side, per wife stated lvad was informed . Gout in left knee and ankle. Started  Prednisone 20mg  Take 1.5 tablets (30 mg total) by mouth once daily 4/11 - 4/21.

## 2018-04-19 ENCOUNTER — CLINICAL SUPPORT (OUTPATIENT)
Dept: TRANSPLANT | Facility: CLINIC | Age: 53
End: 2018-04-19
Payer: COMMERCIAL

## 2018-04-19 ENCOUNTER — OFFICE VISIT (OUTPATIENT)
Dept: TRANSPLANT | Facility: CLINIC | Age: 53
End: 2018-04-19
Payer: COMMERCIAL

## 2018-04-19 ENCOUNTER — LAB VISIT (OUTPATIENT)
Dept: LAB | Facility: HOSPITAL | Age: 53
End: 2018-04-19
Attending: INTERNAL MEDICINE
Payer: COMMERCIAL

## 2018-04-19 ENCOUNTER — ANTI-COAG VISIT (OUTPATIENT)
Dept: CARDIOLOGY | Facility: CLINIC | Age: 53
End: 2018-04-19

## 2018-04-19 VITALS — HEIGHT: 73 IN | TEMPERATURE: 99 F | WEIGHT: 257 LBS | SYSTOLIC BLOOD PRESSURE: 88 MMHG | BODY MASS INDEX: 34.06 KG/M2

## 2018-04-19 DIAGNOSIS — I50.9 HEART FAILURE, UNSPECIFIED HEART FAILURE CHRONICITY, UNSPECIFIED HEART FAILURE TYPE: ICD-10-CM

## 2018-04-19 DIAGNOSIS — Z95.811 HEART REPLACED BY HEART ASSIST DEVICE: ICD-10-CM

## 2018-04-19 DIAGNOSIS — Z95.811 LVAD (LEFT VENTRICULAR ASSIST DEVICE) PRESENT: ICD-10-CM

## 2018-04-19 DIAGNOSIS — Z79.01 LONG TERM (CURRENT) USE OF ANTICOAGULANTS: ICD-10-CM

## 2018-04-19 LAB
ALBUMIN SERPL BCP-MCNC: 3.5 G/DL
ALP SERPL-CCNC: 116 U/L
ALT SERPL W/O P-5'-P-CCNC: 23 U/L
ANION GAP SERPL CALC-SCNC: 11 MMOL/L
AST SERPL-CCNC: 27 U/L
BASOPHILS # BLD AUTO: 0.01 K/UL
BASOPHILS NFR BLD: 0.1 %
BILIRUB DIRECT SERPL-MCNC: 0.6 MG/DL
BILIRUB SERPL-MCNC: 0.8 MG/DL
BNP SERPL-MCNC: 950 PG/ML
BUN SERPL-MCNC: 20 MG/DL
CALCIUM SERPL-MCNC: 9.7 MG/DL
CHLORIDE SERPL-SCNC: 102 MMOL/L
CHOLEST SERPL-MCNC: 147 MG/DL
CHOLEST/HDLC SERPL: 2 {RATIO}
CO2 SERPL-SCNC: 26 MMOL/L
CREAT SERPL-MCNC: 1.6 MG/DL
CRP SERPL-MCNC: 64.6 MG/L
DIFFERENTIAL METHOD: ABNORMAL
EOSINOPHIL # BLD AUTO: 0.1 K/UL
EOSINOPHIL NFR BLD: 1.2 %
ERYTHROCYTE [DISTWIDTH] IN BLOOD BY AUTOMATED COUNT: 16.9 %
EST. GFR  (AFRICAN AMERICAN): 56.8 ML/MIN/1.73 M^2
EST. GFR  (NON AFRICAN AMERICAN): 49.2 ML/MIN/1.73 M^2
GLUCOSE SERPL-MCNC: 127 MG/DL
HCT VFR BLD AUTO: 34 %
HDLC SERPL-MCNC: 73 MG/DL
HDLC SERPL: 49.7 %
HGB BLD-MCNC: 9.6 G/DL
IMM GRANULOCYTES # BLD AUTO: 0.02 K/UL
IMM GRANULOCYTES NFR BLD AUTO: 0.3 %
INR PPP: 2.4
LDH SERPL L TO P-CCNC: 345 U/L
LDLC SERPL CALC-MCNC: 60.8 MG/DL
LYMPHOCYTES # BLD AUTO: 0.5 K/UL
LYMPHOCYTES NFR BLD: 6.8 %
MAGNESIUM SERPL-MCNC: 2.3 MG/DL
MCH RBC QN AUTO: 24.7 PG
MCHC RBC AUTO-ENTMCNC: 28.2 G/DL
MCV RBC AUTO: 88 FL
MONOCYTES # BLD AUTO: 0.4 K/UL
MONOCYTES NFR BLD: 6 %
NEUTROPHILS # BLD AUTO: 6.3 K/UL
NEUTROPHILS NFR BLD: 85.6 %
NONHDLC SERPL-MCNC: 74 MG/DL
NRBC BLD-RTO: 0 /100 WBC
PHOSPHATE SERPL-MCNC: 3.5 MG/DL
PLATELET # BLD AUTO: 355 K/UL
PMV BLD AUTO: 10.4 FL
POTASSIUM SERPL-SCNC: 3.8 MMOL/L
PREALB SERPL-MCNC: 27 MG/DL
PROT SERPL-MCNC: 8.1 G/DL
PROTHROMBIN TIME: 23.3 SEC
RBC # BLD AUTO: 3.88 M/UL
SODIUM SERPL-SCNC: 139 MMOL/L
T4 FREE SERPL-MCNC: 1.38 NG/DL
T4 SERPL-MCNC: 8 UG/DL
TRIGL SERPL-MCNC: 66 MG/DL
TSH SERPL DL<=0.005 MIU/L-ACNC: 4.48 UIU/ML
WBC # BLD AUTO: 7.31 K/UL

## 2018-04-19 PROCEDURE — 36415 COLL VENOUS BLD VENIPUNCTURE: CPT

## 2018-04-19 PROCEDURE — 99999 PR PBB SHADOW E&M-EST. PATIENT-LVL III: CPT | Mod: PBBFAC,,, | Performed by: INTERNAL MEDICINE

## 2018-04-19 PROCEDURE — 82248 BILIRUBIN DIRECT: CPT

## 2018-04-19 PROCEDURE — 84439 ASSAY OF FREE THYROXINE: CPT

## 2018-04-19 PROCEDURE — 84436 ASSAY OF TOTAL THYROXINE: CPT

## 2018-04-19 PROCEDURE — 80053 COMPREHEN METABOLIC PANEL: CPT

## 2018-04-19 PROCEDURE — 80061 LIPID PANEL: CPT

## 2018-04-19 PROCEDURE — 83615 LACTATE (LD) (LDH) ENZYME: CPT

## 2018-04-19 PROCEDURE — 85025 COMPLETE CBC W/AUTO DIFF WBC: CPT

## 2018-04-19 PROCEDURE — 99214 OFFICE O/P EST MOD 30 MIN: CPT | Mod: S$GLB,,, | Performed by: INTERNAL MEDICINE

## 2018-04-19 PROCEDURE — 84134 ASSAY OF PREALBUMIN: CPT

## 2018-04-19 PROCEDURE — 83735 ASSAY OF MAGNESIUM: CPT

## 2018-04-19 PROCEDURE — 86140 C-REACTIVE PROTEIN: CPT

## 2018-04-19 PROCEDURE — 84443 ASSAY THYROID STIM HORMONE: CPT

## 2018-04-19 PROCEDURE — 80323 ALKALOIDS NOS: CPT

## 2018-04-19 PROCEDURE — 85610 PROTHROMBIN TIME: CPT

## 2018-04-19 PROCEDURE — 83880 ASSAY OF NATRIURETIC PEPTIDE: CPT

## 2018-04-19 PROCEDURE — 84100 ASSAY OF PHOSPHORUS: CPT

## 2018-04-19 RX ORDER — SPIRONOLACTONE 25 MG/1
25 TABLET ORAL DAILY
Qty: 30 TABLET | Refills: 11 | Status: SHIPPED | OUTPATIENT
Start: 2018-04-19 | End: 2018-06-06 | Stop reason: SDUPTHER

## 2018-04-19 RX ORDER — FUROSEMIDE 40 MG/1
40 TABLET ORAL 2 TIMES DAILY
Qty: 60 TABLET | Refills: 11 | Status: SHIPPED | OUTPATIENT
Start: 2018-04-19 | End: 2018-05-17 | Stop reason: SDUPTHER

## 2018-04-19 NOTE — LETTER
April 19, 2018        Nader Chiu  [address]  [address]  [place] [address]  Phone: [phone]  Fax: [phone]             [facility] [address] [place] [address]  Phone: [phone]   Patient: Tim Richards   MR Number: [id number]   YOB: 1965   Date of Visit: 4/19/2018       Dear Dr. Nader Chiu    Thank you for referring Tim Richards to me for evaluation. Attached you will find relevant portions of my assessment and plan of care.    If you have questions, please do not hesitate to call me. I look forward to following Tim Richards along with you.    Sincerely,    Bettye Connors MD    Enclosure    If you would like to receive this communication electronically, please contact [email or web address] or [phone] to request [employer] access.    [employer] is a tool which provides read-only access to select patient information with whom you have a relationship. Its easy to use and provides real time access to review your patients record including encounter summaries, notes, results, and demographic information.    If you feel you have received this communication in error or would no longer like to receive these types of communications, please e-mail [email or web address]

## 2018-04-19 NOTE — PROGRESS NOTES
Subjective:   Patient ID:  Tim Richards is a 52 y.o. male who presents for LVAD followup visit.    Implant Date: 3/8/2018  VAD Type : HM II   VAD speed is at  9000 rpm.   Implanted for : DT , Declined for OHT due to:  Recent tobacco and alcohol      No VAD alarms  Lower flows today 5.5 to 7.6 compared to last week (7 to 9 then - gout causing this?)  Doppler 88 despite taking amolidipine 10 this am prior to visit  Map is 68   DLES is a 2    TXP SACHI INTERROGATIONS 4/12/2018   Type HeartMate II   Flow 6.0   Speed 9000   PI 5.9   Power ([place]) 5.7   LSL 8600   Pulsatility Intermittent pulse       HPI Probably nonischemic CHF s/p HeartMate II Implanted 3/8/18 as DT with repositioning of outflow graft 3/9/18.  S/p chest closure 3/10/18.  Patient is enrolled in PREVENT II and his speed was set at 9000 rpm.  At last visit, he was mildly volumed overloaded  so I kept him on 40 / 40 lasix.     Since that time his gout is better as he is able to walk and is off prednisone. His edema is mild (unchanged compared to prior)  Despite the fact that clinic weight is four kg increase in weight.  Believes weight gain is related to increased appetite post LVAD    Echo 4/12/18    1 - Moderate left ventricular enlargement.     2 - Severely depressed left ventricular systolic function (EF <10%). LVEDD 8.6    3 - Biatrial enlargement.     4 - Eccentric hypertrophy.     5 - Moderately depressed right ventricular systolic function .     6 - Moderate to severe mitral regurgitation.     7 - Mild tricuspid regurgitation.     8 - Increased central venous pressure.     9 - Pulmonary hypertension. The estimated PA systolic pressure is 47 mmHg.     10 - HeartMate II LVAD; speed 9000.     Review of Systems   Constitution: Negative for decreased appetite, weight gain and weight loss.   Cardiovascular: Negative for chest pain, dyspnea on exertion, leg swelling, near-syncope, orthopnea and palpitations.   Respiratory: Negative for cough and  "shortness of breath.    Musculoskeletal: Negative for myalgias.   Gastrointestinal: Negative for jaundice.       Objective:       Physical Exam   Constitutional: He is oriented to person, place, and time. He appears well-developed and well-nourished. He is active. He is not intubated.   BP (!) 88/0 (BP Location: Right arm, Patient Position: Sitting, BP Method: Large (Manual)) Comment: doppler  Temp 99.3 °F (37.4 °C) (Oral)   Ht 6' 1" (1.854 m)   Wt 116.6 kg (257 lb)   BMI 33.91 kg/m²      HENT:   Head: Normocephalic and atraumatic. Hair is normal.   Right Ear: External ear normal.   Left Ear: External ear normal.   Nose: Nose normal. No nasal deformity. No epistaxis.  No foreign bodies.   Mouth/Throat: Mucous membranes are normal. Mucous membranes are not cyanotic. No oropharyngeal exudate.   Eyes: Conjunctivae and EOM are normal. Pupils are equal, round, and reactive to light.   Neck: Neck supple. No hepatojugular reflux and no JVD present.   Cardiovascular: Normal rate, regular rhythm, normal heart sounds and normal pulses.  Exam reveals no gallop.    Pulmonary/Chest: Effort normal and breath sounds normal. No apnea and no tachypnea. He is not intubated. No respiratory distress. He exhibits no tenderness.   Abdominal: Soft. Normal appearance and bowel sounds are normal. There is no tenderness. No hernia.   Musculoskeletal: Normal range of motion.   Neurological: He is alert and oriented to person, place, and time. He displays no seizure activity.   Skin: Skin is warm, dry and intact. No rash noted. No pallor.   Psychiatric: He has a normal mood and affect. His speech is normal and behavior is normal. Thought content normal. Cognition and memory are normal.       Lab Results   Component Value Date    WBC 7.31 04/19/2018    HGB 9.6 (L) 04/19/2018    HCT 34.0 (L) 04/19/2018    MCV 88 04/19/2018     (H) 04/19/2018    CO2 26 04/19/2018    CREATININE 1.6 (H) 04/19/2018    CALCIUM 9.7 04/19/2018    ALBUMIN 3.5 " 04/19/2018    AST 27 04/19/2018     (H) 04/19/2018    ALT 23 04/19/2018     (H) 04/19/2018       Lab Results   Component Value Date    INR 2.4 (H) 04/19/2018    INR 4.0 (H) 04/12/2018    INR 1.9 (H) 04/05/2018       BNP   Date Value Ref Range Status   04/19/2018 950 (H) 0 - 99 pg/mL Final     Comment:     Values of less than 100 pg/ml are consistent with non-CHF populations.   04/12/2018 1,454 (H) 0 - 99 pg/mL Final     Comment:     Values of less than 100 pg/ml are consistent with non-CHF populations.   04/05/2018 1,310 (H) 0 - 99 pg/mL Final     Comment:     Values of less than 100 pg/ml are consistent with non-CHF populations.       LD   Date Value Ref Range Status   04/19/2018 345 (H) 110 - 260 U/L Final     Comment:     Results are increased in hemolyzed samples.  *Result may be interfered by visible hemolysis     04/12/2018 290 (H) 110 - 260 U/L Final     Comment:     Results are increased in hemolyzed samples.   04/05/2018 314 (H) 110 - 260 U/L Final     Comment:     Results are increased in hemolyzed samples.         Assessment:      1. Heart replaced by heart assist device    2. Heart failure, unspecified heart failure chronicity, unspecified heart failure type        Plan:   Six weeks post LVAD with intact sternum ok to drive   Despite mild edema - edema improving will decrease lasix to 40 BID   BP could be better controlled Will add aldactone today / stop potassium with BMP next week with INR   Will wait for better BP and volume control prior to increase speed to 9200   VAD interrogation was performed in clinic  Most recent echo report noted in HPI  UPDATED plan /followup noted in patient instruction / followup section in addition to being note below  Any VAD management kits dispensed today medically necessary  Recommend 2 gram sodium restriction and 1500cc fluid restriction.  Encourage physical activity with graded exercise program.  Requested patient to weigh themselves daily, and to  notify us if their weight increases by more than 3 lbs in 1 day or 5 lbs in 1 week.     Listed for transplant: No

## 2018-04-19 NOTE — PROGRESS NOTES
"Date of Implant with Heartmate II LVAD: 3/18/18    PATIENT ARRIVED IN CLINIC:  Ambulatory  Accompanied by:Wife    Vitals  Doppler:88  Pulsatile:no   PAIN:7 on 0-10 pain scale,  location of pain: knee,   description of pain:back of knee  Is patient currently on medications for pain?yes What kind? tylenol    VAD Interrogation:  TXP SACHI INTERROGATIONS 2018   Type HeartMate II   Flow 6.3   Speed 9000   PI 5.1   Power ([place]) 5.9   LSL 8600   Pulsatility No Pulse       Flow in history: 5.5-7.6  History Lo.log    Problems / Issues / Alarms with VAD if any:none   Any Equipment Issues? (Refer to  note for details):none  Emergency Equipment With Patient:yes   VAD Binder With Patient: yes   Reviewed VAD Numbers In Binder:yes  VAD Sounds: SMOOTH   Manual & Visual Inspection Of Driveline:  NO KINKS OR TEARS NOTED     LVAD Dressing/DLES:  Appearance Of Driveline:"1"  Antibiotics:NO  Frequency of Dressing Changes:Daily with soap and water   Stabilization Device In Use: YES [place]    Pt In Need Of Management Kits?: no  It is medically necessary to have VAD management kits in order to prevent infection or to assist in the healing of an infected DLES.    Assessment:   Complaints/reason for visit today: Routine follow up    Complaints Of Nausea / Vomiting:none   Appearance and Frequency Of Stools:Normal and formed without blood every other day   Patient reports clear, yellow urine:YES   Coping/Depression/Anxiety:patient coping ok  Sleep Habits:8-10 hrs /night  Sleep Aids:none  Showering :NO, Patient reminded to change dressing immediately after showering and drying off.    Activity/Exercise:yes, 2 hours daily   Driving:no, Reminded to pull over should there be an alarm before looking down at controller.     Labs:    Chemistry        Component Value Date/Time     2018 0940    K 3.8 2018 0940     2018 0940    CO2 26 2018 0940    BUN 20 2018 0940    " CREATININE 1.6 (H) 04/19/2018 0940     (H) 04/19/2018 0940        Component Value Date/Time    CALCIUM 9.7 04/19/2018 0940    ALKPHOS 116 04/19/2018 0940    AST 27 04/19/2018 0940    ALT 23 04/19/2018 0940    BILITOT 0.8 04/19/2018 0940    ESTGFRAFRICA 56.8 (A) 04/19/2018 0940    EGFRNONAA 49.2 (A) 04/19/2018 0940            Magnesium   Date Value Ref Range Status   04/19/2018 2.3 1.6 - 2.6 mg/dL Final       Lab Results   Component Value Date    WBC 7.31 04/19/2018    HGB 9.6 (L) 04/19/2018    HCT 34.0 (L) 04/19/2018    MCV 88 04/19/2018     (H) 04/19/2018       Lab Results   Component Value Date    INR 2.4 (H) 04/19/2018    INR 4.0 (H) 04/12/2018    INR 1.9 (H) 04/05/2018       BNP   Date Value Ref Range Status   04/19/2018 950 (H) 0 - 99 pg/mL Final     Comment:     Values of less than 100 pg/ml are consistent with non-CHF populations.   04/12/2018 1,454 (H) 0 - 99 pg/mL Final     Comment:     Values of less than 100 pg/ml are consistent with non-CHF populations.   04/05/2018 1,310 (H) 0 - 99 pg/mL Final     Comment:     Values of less than 100 pg/ml are consistent with non-CHF populations.       LD   Date Value Ref Range Status   04/19/2018 345 (H) 110 - 260 U/L Final     Comment:     Results are increased in hemolyzed samples.  *Result may be interfered by visible hemolysis     04/12/2018 290 (H) 110 - 260 U/L Final     Comment:     Results are increased in hemolyzed samples.   04/05/2018 314 (H) 110 - 260 U/L Final     Comment:     Results are increased in hemolyzed samples.       Labs reviewed with patient: YES      Patient Satisfaction Survey Completed by Patient: no  (explained about signature and box to check)  Medication reconciliation: per MA.  Purple card updated today:YES   [facility]     Education: Reviewed driveline care, emergency procedures, alarms with patient.  Reminded patient never to change the controller without paging a VAD coordinator first.   Also reviewed how to get in touch with a VAD coordinator in the event of an emergency.       Plans/Needs:Patient presents today for BP followup. Patient states he is doing well. Doppler 88 today. . Creatinine 1.6. Per Dr. Connors, no speed change today. Patient to discontinue potassium and start aldactone 25 mg daily with BMP in one week. Patient to RTC 2 weeks. Refer to MD note.

## 2018-04-19 NOTE — PROGRESS NOTES
Wife is confirming incorrect dose reports that patient has been taking 7.5 mg M,W,F and 5 mg all other days . Wife stated she is following a dose on '' purple card '' she received from 3/29 when patient was discharged. Wife has not been following instructions that was  giving over the phone .  Prednisone completed 4/15

## 2018-04-20 RX ORDER — AMIODARONE HYDROCHLORIDE 200 MG/1
TABLET ORAL
Qty: 30 TABLET | Refills: 7 | Status: SHIPPED | OUTPATIENT
Start: 2018-04-20 | End: 2018-06-06 | Stop reason: SDUPTHER

## 2018-04-21 LAB
COTININE SERPL-MCNC: <3 NG/ML
NICOTINE SERPL-MCNC: <3 NG/ML

## 2018-04-23 ENCOUNTER — LAB VISIT (OUTPATIENT)
Dept: LAB | Facility: HOSPITAL | Age: 53
End: 2018-04-23
Attending: INTERNAL MEDICINE
Payer: COMMERCIAL

## 2018-04-23 ENCOUNTER — ANTI-COAG VISIT (OUTPATIENT)
Dept: CARDIOLOGY | Facility: CLINIC | Age: 53
End: 2018-04-23

## 2018-04-23 DIAGNOSIS — Z95.811 HEART REPLACED BY HEART ASSIST DEVICE: ICD-10-CM

## 2018-04-23 DIAGNOSIS — Z79.01 LONG TERM (CURRENT) USE OF ANTICOAGULANTS: ICD-10-CM

## 2018-04-23 DIAGNOSIS — Z95.811 LVAD (LEFT VENTRICULAR ASSIST DEVICE) PRESENT: ICD-10-CM

## 2018-04-23 DIAGNOSIS — I50.9 HEART FAILURE: ICD-10-CM

## 2018-04-23 LAB
INR PPP: 3.9
PROTHROMBIN TIME: 41 SEC

## 2018-04-23 PROCEDURE — 36415 COLL VENOUS BLD VENIPUNCTURE: CPT

## 2018-04-23 PROCEDURE — 85610 PROTHROMBIN TIME: CPT

## 2018-04-25 ENCOUNTER — TELEPHONE (OUTPATIENT)
Dept: TRANSPLANT | Facility: CLINIC | Age: 53
End: 2018-04-25

## 2018-04-26 ENCOUNTER — ANTI-COAG VISIT (OUTPATIENT)
Dept: CARDIOLOGY | Facility: CLINIC | Age: 53
End: 2018-04-26

## 2018-04-26 ENCOUNTER — NURSE TRIAGE (OUTPATIENT)
Dept: ADMINISTRATIVE | Facility: CLINIC | Age: 53
End: 2018-04-26

## 2018-04-26 ENCOUNTER — LAB VISIT (OUTPATIENT)
Dept: LAB | Facility: HOSPITAL | Age: 53
End: 2018-04-26
Attending: NURSE PRACTITIONER
Payer: COMMERCIAL

## 2018-04-26 DIAGNOSIS — Z79.01 LONG TERM (CURRENT) USE OF ANTICOAGULANTS: ICD-10-CM

## 2018-04-26 DIAGNOSIS — I50.9 HEART FAILURE: ICD-10-CM

## 2018-04-26 DIAGNOSIS — Z95.811 HEART REPLACED BY HEART ASSIST DEVICE: ICD-10-CM

## 2018-04-26 DIAGNOSIS — I50.9 CONGESTIVE HEART FAILURE: ICD-10-CM

## 2018-04-26 DIAGNOSIS — Z95.811 LVAD (LEFT VENTRICULAR ASSIST DEVICE) PRESENT: ICD-10-CM

## 2018-04-26 LAB
ANION GAP SERPL CALC-SCNC: 10 MMOL/L
BUN SERPL-MCNC: 17 MG/DL
CALCIUM SERPL-MCNC: 9.7 MG/DL
CHLORIDE SERPL-SCNC: 104 MMOL/L
CO2 SERPL-SCNC: 23 MMOL/L
CREAT SERPL-MCNC: 1.7 MG/DL
EST. GFR  (AFRICAN AMERICAN): 53 ML/MIN/1.73 M^2
EST. GFR  (NON AFRICAN AMERICAN): 46 ML/MIN/1.73 M^2
GLUCOSE SERPL-MCNC: 102 MG/DL
INR PPP: 2.1
POTASSIUM SERPL-SCNC: 3.8 MMOL/L
PROTHROMBIN TIME: 22.3 SEC
SODIUM SERPL-SCNC: 137 MMOL/L

## 2018-04-26 PROCEDURE — 85610 PROTHROMBIN TIME: CPT

## 2018-04-26 PROCEDURE — 80048 BASIC METABOLIC PNL TOTAL CA: CPT

## 2018-04-26 PROCEDURE — 36415 COLL VENOUS BLD VENIPUNCTURE: CPT

## 2018-04-26 NOTE — TELEPHONE ENCOUNTER
Reason for Disposition   [1] Follow-up call from patient regarding patient's clinical status AND [2] information urgent     Kelly, Tim's wife, called OOC triage line in error.  She forgot to identify her  as LVAD patient when she called for INR results and new orders.  I called hospital , who will assist Kelly in reaching the appropriate on-call team for LVAD.  Message to LVAD team.  Please contact caller directly with any additional care advice.    Protocols used: ST PCP CALL - NO TRIAGE-A-

## 2018-05-03 ENCOUNTER — OFFICE VISIT (OUTPATIENT)
Dept: TRANSPLANT | Facility: CLINIC | Age: 53
End: 2018-05-03
Attending: INTERNAL MEDICINE
Payer: COMMERCIAL

## 2018-05-03 ENCOUNTER — ANTI-COAG VISIT (OUTPATIENT)
Dept: CARDIOLOGY | Facility: CLINIC | Age: 53
End: 2018-05-03

## 2018-05-03 ENCOUNTER — LAB VISIT (OUTPATIENT)
Dept: LAB | Facility: HOSPITAL | Age: 53
End: 2018-05-03
Attending: INTERNAL MEDICINE
Payer: COMMERCIAL

## 2018-05-03 ENCOUNTER — CLINICAL SUPPORT (OUTPATIENT)
Dept: TRANSPLANT | Facility: CLINIC | Age: 53
End: 2018-05-03
Payer: COMMERCIAL

## 2018-05-03 VITALS — TEMPERATURE: 99 F | SYSTOLIC BLOOD PRESSURE: 88 MMHG | HEIGHT: 73 IN | BODY MASS INDEX: 33.27 KG/M2 | WEIGHT: 251 LBS

## 2018-05-03 DIAGNOSIS — I50.9 HEART FAILURE: ICD-10-CM

## 2018-05-03 DIAGNOSIS — I50.22 CHRONIC SYSTOLIC HEART FAILURE: ICD-10-CM

## 2018-05-03 DIAGNOSIS — Z95.811 HEART REPLACED BY HEART ASSIST DEVICE: ICD-10-CM

## 2018-05-03 DIAGNOSIS — N18.30 CKD (CHRONIC KIDNEY DISEASE), STAGE III: ICD-10-CM

## 2018-05-03 DIAGNOSIS — Z79.01 LONG TERM (CURRENT) USE OF ANTICOAGULANTS: ICD-10-CM

## 2018-05-03 DIAGNOSIS — I47.29 PVT (PAROXYSMAL VENTRICULAR TACHYCARDIA): ICD-10-CM

## 2018-05-03 DIAGNOSIS — F17.210 CIGARETTE NICOTINE DEPENDENCE WITHOUT COMPLICATION: ICD-10-CM

## 2018-05-03 DIAGNOSIS — Z95.811 LVAD (LEFT VENTRICULAR ASSIST DEVICE) PRESENT: Primary | ICD-10-CM

## 2018-05-03 DIAGNOSIS — Z76.82 ORGAN TRANSPLANT CANDIDATE: ICD-10-CM

## 2018-05-03 DIAGNOSIS — Z95.810 BIVENTRICULAR IMPLANTABLE CARDIOVERTER-DEFIBRILLATOR IN SITU: ICD-10-CM

## 2018-05-03 DIAGNOSIS — I10 HYPERTENSION, UNSPECIFIED TYPE: ICD-10-CM

## 2018-05-03 DIAGNOSIS — Z95.811 LVAD (LEFT VENTRICULAR ASSIST DEVICE) PRESENT: ICD-10-CM

## 2018-05-03 DIAGNOSIS — F10.10 ALCOHOL ABUSE: ICD-10-CM

## 2018-05-03 DIAGNOSIS — I42.0 DILATED CARDIOMYOPATHY: ICD-10-CM

## 2018-05-03 PROBLEM — J93.9 PNEUMOTHORAX: Status: RESOLVED | Noted: 2018-03-12 | Resolved: 2018-05-03

## 2018-05-03 PROBLEM — R50.9 ELEVATED TEMPERATURE: Status: RESOLVED | Noted: 2018-03-27 | Resolved: 2018-05-03

## 2018-05-03 LAB
ALBUMIN SERPL BCP-MCNC: 3.7 G/DL
ALP SERPL-CCNC: 112 U/L
ALT SERPL W/O P-5'-P-CCNC: 14 U/L
ANION GAP SERPL CALC-SCNC: 12 MMOL/L
AST SERPL-CCNC: 19 U/L
BASOPHILS # BLD AUTO: 0.01 K/UL
BASOPHILS NFR BLD: 0.2 %
BILIRUB DIRECT SERPL-MCNC: 0.5 MG/DL
BILIRUB SERPL-MCNC: 0.7 MG/DL
BNP SERPL-MCNC: 1079 PG/ML
BUN SERPL-MCNC: 15 MG/DL
CALCIUM SERPL-MCNC: 9.9 MG/DL
CHLORIDE SERPL-SCNC: 102 MMOL/L
CO2 SERPL-SCNC: 23 MMOL/L
CREAT SERPL-MCNC: 1.7 MG/DL
CRP SERPL-MCNC: 15.3 MG/L
DIFFERENTIAL METHOD: ABNORMAL
EOSINOPHIL # BLD AUTO: 0.1 K/UL
EOSINOPHIL NFR BLD: 2.2 %
ERYTHROCYTE [DISTWIDTH] IN BLOOD BY AUTOMATED COUNT: 16.2 %
EST. GFR  (AFRICAN AMERICAN): 52.8 ML/MIN/1.73 M^2
EST. GFR  (NON AFRICAN AMERICAN): 45.7 ML/MIN/1.73 M^2
GLUCOSE SERPL-MCNC: 105 MG/DL
HCT VFR BLD AUTO: 34.7 %
HGB BLD-MCNC: 9.5 G/DL
IMM GRANULOCYTES # BLD AUTO: 0.03 K/UL
IMM GRANULOCYTES NFR BLD AUTO: 0.7 %
INR PPP: 2.5
LDH SERPL L TO P-CCNC: 298 U/L
LYMPHOCYTES # BLD AUTO: 0.9 K/UL
LYMPHOCYTES NFR BLD: 19.1 %
MAGNESIUM SERPL-MCNC: 2.3 MG/DL
MCH RBC QN AUTO: 23.8 PG
MCHC RBC AUTO-ENTMCNC: 27.4 G/DL
MCV RBC AUTO: 87 FL
MONOCYTES # BLD AUTO: 0.4 K/UL
MONOCYTES NFR BLD: 9.3 %
NEUTROPHILS # BLD AUTO: 3.2 K/UL
NEUTROPHILS NFR BLD: 68.5 %
NRBC BLD-RTO: 0 /100 WBC
PHOSPHATE SERPL-MCNC: 3 MG/DL
PLATELET # BLD AUTO: 237 K/UL
PMV BLD AUTO: 10 FL
POTASSIUM SERPL-SCNC: 3.3 MMOL/L
PREALB SERPL-MCNC: 21 MG/DL
PROT SERPL-MCNC: 8 G/DL
PROTHROMBIN TIME: 23.8 SEC
RBC # BLD AUTO: 4 M/UL
SODIUM SERPL-SCNC: 137 MMOL/L
WBC # BLD AUTO: 4.6 K/UL

## 2018-05-03 PROCEDURE — 36415 COLL VENOUS BLD VENIPUNCTURE: CPT

## 2018-05-03 PROCEDURE — 83880 ASSAY OF NATRIURETIC PEPTIDE: CPT

## 2018-05-03 PROCEDURE — 99214 OFFICE O/P EST MOD 30 MIN: CPT | Mod: S$GLB,,, | Performed by: INTERNAL MEDICINE

## 2018-05-03 PROCEDURE — 99999 PR PBB SHADOW E&M-EST. PATIENT-LVL III: CPT | Mod: PBBFAC,,, | Performed by: INTERNAL MEDICINE

## 2018-05-03 PROCEDURE — 83615 LACTATE (LD) (LDH) ENZYME: CPT

## 2018-05-03 PROCEDURE — 84100 ASSAY OF PHOSPHORUS: CPT

## 2018-05-03 PROCEDURE — 85025 COMPLETE CBC W/AUTO DIFF WBC: CPT

## 2018-05-03 PROCEDURE — 82248 BILIRUBIN DIRECT: CPT

## 2018-05-03 PROCEDURE — 86140 C-REACTIVE PROTEIN: CPT

## 2018-05-03 PROCEDURE — 85610 PROTHROMBIN TIME: CPT

## 2018-05-03 PROCEDURE — 80053 COMPREHEN METABOLIC PANEL: CPT

## 2018-05-03 PROCEDURE — 83735 ASSAY OF MAGNESIUM: CPT

## 2018-05-03 PROCEDURE — 93750 INTERROGATION VAD IN PERSON: CPT | Mod: S$GLB,,, | Performed by: INTERNAL MEDICINE

## 2018-05-03 PROCEDURE — 84134 ASSAY OF PREALBUMIN: CPT

## 2018-05-03 RX ORDER — TRAMADOL HYDROCHLORIDE 50 MG/1
50 TABLET ORAL EVERY 6 HOURS PRN
Qty: 30 TABLET | Refills: 1 | Status: SHIPPED | OUTPATIENT
Start: 2018-05-03 | End: 2018-05-13

## 2018-05-03 RX ORDER — HYDRALAZINE HYDROCHLORIDE 25 MG/1
25 TABLET, FILM COATED ORAL EVERY 8 HOURS
Qty: 90 TABLET | Refills: 11 | Status: SHIPPED | OUTPATIENT
Start: 2018-05-03 | End: 2018-05-17 | Stop reason: SDUPTHER

## 2018-05-03 NOTE — PROGRESS NOTES
Date of Implant with Heartmate II LVAD: 3/8/18    PATIENT ARRIVED IN CLINIC:  Ambulatory   Accompanied by: wife    Vitals  Doppler: 88  PAIN: 8 on 0-10 pain scale, location of pain: right shoulder/clavicle, description of pain: stabbing  Is patient currently on medications for pain? yes What kind? Oxy, 3x weekly    VAD Interrogation:  TXP SACHI INTERROGATIONS 5/3/2018   Type HeartMate II   Flow 6.3   Speed 9000   PI 5.6   Power ([place]) 5.6   LSL 8600   Pulsatility Intermittent pulse       Flow in history: 6s  Waveforms: [id number]  History Log:     Problems / Issues / Alarms with VAD if any: None noted  Any Equipment Issues: None noted (Refer to  note for complete details)   Emergency Equipment With Patient: Yes    VAD Binder With Patient: yes   Reviewed VAD Numbers In Binder: yes  VAD Sounds: Smooth  Manual & Visual Inspection Of Driveline: No kinks or tears noted     LVAD Dressing/DLES:  Appearance Of Driveline: 1 with large scab  Antibiotics: NO  Frequency of Dressing Changes: daily & soap and water dressing  Stabilization Device In Use: yes, sun securement device     Pt In Need Of Management Kits? No  It is medically necessary to have VAD management kits in order to prevent infection or to assist in the healing of an infected DLES.    Assessment:   Complaints/reason for visit today:routine  Complaints Of Nausea / Vomiting: None noted  Appearance and Frequency Of Stools: normal and formed without blood & every other day  Color Of Urine: clear/yellow  Coping/Depression/Anxiety: coping okay  Sleep Habits: 8 hrs /night  Sleep Aids: None noted  Showering: No; informed pt that we will revisit showering at next visit     Activity/Exercise: 30 minutes daily; housework   Driving: Yes. Reminded to pull over should there be an alarm before looking down at controller.    Labs:    Chemistry        Component Value Date/Time     05/03/2018 0906    K 3.3 (L) 05/03/2018 0906     05/03/2018 0906     CO2 23 05/03/2018 0906    BUN 15 05/03/2018 0906    CREATININE 1.7 (H) 05/03/2018 0906     05/03/2018 0906        Component Value Date/Time    CALCIUM 9.9 05/03/2018 0906    ALKPHOS 112 05/03/2018 0906    AST 19 05/03/2018 0906    ALT 14 05/03/2018 0906    BILITOT 0.7 05/03/2018 0906    ESTGFRAFRICA 52.8 (A) 05/03/2018 0906    EGFRNONAA 45.7 (A) 05/03/2018 0906            Magnesium   Date Value Ref Range Status   05/03/2018 2.3 1.6 - 2.6 mg/dL Final       Lab Results   Component Value Date    WBC 4.60 05/03/2018    HGB 9.5 (L) 05/03/2018    HCT 34.7 (L) 05/03/2018    MCV 87 05/03/2018     05/03/2018       Lab Results   Component Value Date    INR 2.5 (H) 05/03/2018    INR 2.1 (H) 04/26/2018    INR 3.9 (H) 04/23/2018       BNP   Date Value Ref Range Status   05/03/2018 1,079 (H) 0 - 99 pg/mL Final     Comment:     Values of less than 100 pg/ml are consistent with non-CHF populations.   04/19/2018 950 (H) 0 - 99 pg/mL Final     Comment:     Values of less than 100 pg/ml are consistent with non-CHF populations.   04/12/2018 1,454 (H) 0 - 99 pg/mL Final     Comment:     Values of less than 100 pg/ml are consistent with non-CHF populations.       LD   Date Value Ref Range Status   05/03/2018 298 (H) 110 - 260 U/L Final     Comment:     Results are increased in hemolyzed samples.   04/19/2018 345 (H) 110 - 260 U/L Final     Comment:     Results are increased in hemolyzed samples.  *Result may be interfered by visible hemolysis     04/12/2018 290 (H) 110 - 260 U/L Final     Comment:     Results are increased in hemolyzed samples.       Labs reviewed with patient: YES, BNP 1079, K 3.3     Patient Satisfaction Survey Completed by Patient: no  (explained about signature and box to check)  Medication reconciliation: per MA.  Purple card updated today: yes  Coumadin Managed by: [facility]    Education: Reviewed driveline care, emergency procedures, how to change the controller, alarms with patient,  as well as discussed how to page the VAD coordinator in case of an emergency.      Plans/Needs: Pt doing well. BLE edema, 2+. Pt admits to eating out 5x last week, not adhering to low sodium diet. Pt does not like taking Oxycodone for pain due to bad dreams and constipation. Doppler 88- add Hydralazine 25mg TID. Pt asking to be released to return to work- will discuss with Dr. Lopez. Refer to MD note.      Hurricane Season: No

## 2018-05-03 NOTE — PATIENT INSTRUCTIONS
Restart hydralazine 25mg three times a day    Keep salt intake to under 2000 mg sodium, fluids to under 2 L (64 oz)    Try tramadol at night if you need it for the clavicle pain    Call us if you find yourself getting more short of breath, have more swelling or unexpected weight changes, fever, chills, alarms, bloody or black bowel movements, or drainage from your driveline

## 2018-05-03 NOTE — PROCEDURES
TXP SACHI INTERROGATIONS 5/3/2018 4/19/2018 4/12/2018 4/5/2018 3/29/2018 3/25/2018 3/24/2018   Type HeartMate II HeartMate II HeartMate II HeartMate II HeartMate II - -   Flow 6.3 6.3 6.0 6.85 6.4 - -   Speed 9000 9000 9000 9000 9000 - -   PI 5.6 5.1 5.9 5.4 6.1 - -   Power ([place]) 5.6 5.9 5.7 6.0 6.0 - -   LSL 8600 8600 8600 8600 8600 - -   Pulsatility Intermittent pulse No Pulse Intermittent pulse Pulse Intermittent pulse Intermittent pulse Intermittent pulse   }

## 2018-05-03 NOTE — LETTER
May 3, 2018        Nader Chiu  [address]  [address]  [place] [address]  Phone: [phone]  Fax: [phone]             [facility] [address] [place] [address]  Phone: [phone]   Patient: Tim Richards   MR Number: [id number]   YOB: 1965   Date of Visit: 5/3/2018       Dear Dr. Nader Chiu    Thank you for referring Tim Richards to me for evaluation. Attached you will find relevant portions of my assessment and plan of care.    If you have questions, please do not hesitate to call me. I look forward to following Tim Richards along with you.    Sincerely,    Amparo Lopez MD    Enclosure    If you would like to receive this communication electronically, please contact [email or web address] or [phone] to request [employer] access.    [employer] is a tool which provides read-only access to select patient information with whom you have a relationship. Its easy to use and provides real time access to review your patients record including encounter summaries, notes, results, and demographic information.    If you feel you have received this communication in error or would no longer like to receive these types of communications, please e-mail [email or web address]

## 2018-05-03 NOTE — PROGRESS NOTES
Subjective:   Patient ID:  Tim Richards is a 52 y.o. male who presents for LVAD followup visit.    Implant Date: 3/8/2018  VAD Type : HM II   VAD speed is at  9000 rpm.   Implanted for : DT , Declined for OHT due to:  Recent tobacco and alcohol        TXP SACHI INTERROGATIONS 5/3/2018   Type HeartMate II   Flow 6.3   Speed 9000   PI 5.6   Power (Jackson) 5.6   LSL 8600   Pulsatility Intermittent pulse       HPI 53 yo man with nonischemic CHF s/p HeartMate II Implanted 3/8/18 as DT with repositioning of outflow graft 3/9/18.  S/p chest closure 3/10/18.  Patient is enrolled in PREVENT II and his speed was set at 9000 rpm. Here for routine VAD f/u    Since last visit, pt says he's been feling pretty good, esxcept for stabbing pain in his right shoulder- he attributes it to the RHC- occurs in the evening- says its throbbing- took one of the pain pills he had when he left the hospital does not like taking the oxycodone because it makes him constipated and gives him bad dreams- does not think its arthritis- feels it below his collarbone    Pt ate out 5 times last week, has some swelling in his legs. Does not think he has full capacity in his lungs- if he eats, with a full stomach, can't take a deep enough breath, and says when he walks feels like he cant get enough oxygen to continue. He does try to go out and cut grass, and walks all day long. Would like to go back to work- works in purchasing in a warehouse- is not exposed to heavy equipt or chemicals.    Lasix is 40mg bid with aldactone    DLES is grade 1 w scab    Interrogation of device data reveals no alarms, normal flows and power (see VAD interrogation report for full details.)              Echo 4/12/18    1 - Moderate left ventricular enlargement.     2 - Severely depressed left ventricular systolic function (EF <10%). LVEDD 8.6    3 - Biatrial enlargement.     4 - Eccentric hypertrophy.     5 - Moderately depressed right ventricular systolic function .     6 -  "Moderate to severe mitral regurgitation.     7 - Mild tricuspid regurgitation.     8 - Increased central venous pressure.     9 - Pulmonary hypertension. The estimated PA systolic pressure is 47 mmHg.     10 - HeartMate II LVAD; speed 9000.     Review of Systems   Constitution: Negative for decreased appetite, weight gain and weight loss.   Cardiovascular: Negative for chest pain, dyspnea on exertion, leg swelling, near-syncope, orthopnea and palpitations.   Respiratory: Negative for cough and shortness of breath.    Musculoskeletal: Negative for myalgias.   Gastrointestinal: Negative for jaundice.       Objective: BP (!) 88/0 (BP Location: Left arm)   Temp 98.9 °F (37.2 °C)   Ht 6' 1" (1.854 m)   Wt 113.9 kg (251 lb)   BMI 33.12 kg/m²      Doppler 88 I do not feel a pulse    Physical Exam   Constitutional: He is oriented to person, place, and time. He appears well-developed and well-nourished. He is active. He is not intubated.   HENT:   Head: Normocephalic and atraumatic. Hair is normal.   Right Ear: External ear normal.   Left Ear: External ear normal.   Nose: Nose normal. No nasal deformity. No epistaxis.  No foreign bodies.   Mouth/Throat: Mucous membranes are normal. Mucous membranes are not cyanotic. No oropharyngeal exudate.   Eyes: Conjunctivae and EOM are normal. Pupils are equal, round, and reactive to light.   Neck: Neck supple. No hepatojugular reflux and no JVD present.   Cardiovascular: Normal rate, regular rhythm, normal heart sounds and normal pulses.  Exam reveals no gallop.    Normal VAD hum   Pulmonary/Chest: Effort normal and breath sounds normal. No apnea and no tachypnea. He is not intubated. No respiratory distress. He exhibits no tenderness.   Abdominal: Soft. Normal appearance and bowel sounds are normal. There is no tenderness. No hernia.   Musculoskeletal: Normal range of motion. He exhibits edema (1+ to ankles).   Neurological: He is alert and oriented to person, place, and time. He " displays no seizure activity.   Skin: Skin is warm, dry and intact. No rash noted. No pallor.   Psychiatric: He has a normal mood and affect. His speech is normal and behavior is normal. Thought content normal. Cognition and memory are normal.       Lab Results   Component Value Date    WBC 4.60 05/03/2018    HGB 9.5 (L) 05/03/2018    HCT 34.7 (L) 05/03/2018    MCV 87 05/03/2018     05/03/2018    CO2 23 05/03/2018    CREATININE 1.7 (H) 05/03/2018    CALCIUM 9.9 05/03/2018    ALBUMIN 3.7 05/03/2018    AST 19 05/03/2018    BNP 1,079 (H) 05/03/2018    ALT 14 05/03/2018     (H) 05/03/2018       Lab Results   Component Value Date    INR 2.5 (H) 05/03/2018    INR 2.1 (H) 04/26/2018    INR 3.9 (H) 04/23/2018       BNP   Date Value Ref Range Status   05/03/2018 1,079 (H) 0 - 99 pg/mL Final     Comment:     Values of less than 100 pg/ml are consistent with non-CHF populations.   04/19/2018 950 (H) 0 - 99 pg/mL Final     Comment:     Values of less than 100 pg/ml are consistent with non-CHF populations.   04/12/2018 1,454 (H) 0 - 99 pg/mL Final     Comment:     Values of less than 100 pg/ml are consistent with non-CHF populations.       LD   Date Value Ref Range Status   05/03/2018 298 (H) 110 - 260 U/L Final     Comment:     Results are increased in hemolyzed samples.   04/19/2018 345 (H) 110 - 260 U/L Final     Comment:     Results are increased in hemolyzed samples.  *Result may be interfered by visible hemolysis     04/12/2018 290 (H) 110 - 260 U/L Final     Comment:     Results are increased in hemolyzed samples.         Assessment:      1. LVAD (left ventricular assist device) present- now 2 months post op- not yet fully unloaded with BENTON 8.6 cm, BNP>1000-  That says has also been noncompliant with low Na diet and BP is not yet optimized- INR therapeutic   2. Heart replaced by heart assist device    3. Heart failure    4. Chronic systolic heart failure    5. Biventricular implantable  cardioverter-defibrillator in situ    6. CKD (chronic kidney disease), stage III    7. Dilated cardiomyopathy    8. Hypertension, unspecified type    9. Long term (current) use of anticoagulants    10. Organ transplant candidate    11. PVT (paroxysmal ventricular tachycardia)    12. Cigarette nicotine dependence without complication    13. Alcohol abuse        Plan:     Increase speed 9200 rpm today for better unloading, will also start hydralazine 25mg tid for uncontrolled HTN  May ultimately need to increase speed again but will need to exercise caution as his RV is still mod depressed based on last echo    Ok to go back to work- given paperwork for this today    Will use K based salt substitute  Instead of salt daily, we will see next visit if he needs additional K supplementation- reminded to keep Na<2000 mg and ask for no salt in his food when he eats out    Will Try tramadol at night if needed for the clavicle pain    VAD interrogation was performed in clinic    Any VAD management kits dispensed today medically necessary  Recommend 2 gram sodium restriction and 1500cc fluid restriction.  Encourage physical activity with graded exercise program.  Requested patient to weigh themselves daily, and to notify us if their weight increases by more than 3 lbs in 1 day or 5 lbs in 1 week.     Listed for transplant: No    F/u 2 wk with labs, repeat echo post speed change,  visit, and interrogation, will then go to monthly visits,

## 2018-05-17 ENCOUNTER — HOSPITAL ENCOUNTER (OUTPATIENT)
Dept: CARDIOLOGY | Facility: CLINIC | Age: 53
Discharge: HOME | End: 2018-05-17
Attending: INTERNAL MEDICINE
Payer: COMMERCIAL

## 2018-05-17 ENCOUNTER — LAB VISIT (OUTPATIENT)
Dept: LAB | Facility: HOSPITAL | Age: 53
End: 2018-05-17
Attending: INTERNAL MEDICINE
Payer: COMMERCIAL

## 2018-05-17 ENCOUNTER — OFFICE VISIT (OUTPATIENT)
Dept: TRANSPLANT | Facility: CLINIC | Age: 53
End: 2018-05-17
Payer: COMMERCIAL

## 2018-05-17 ENCOUNTER — CLINICAL SUPPORT (OUTPATIENT)
Dept: TRANSPLANT | Facility: CLINIC | Age: 53
End: 2018-05-17
Payer: COMMERCIAL

## 2018-05-17 VITALS — TEMPERATURE: 99 F | HEIGHT: 73 IN | BODY MASS INDEX: 32.87 KG/M2 | SYSTOLIC BLOOD PRESSURE: 86 MMHG | WEIGHT: 248 LBS

## 2018-05-17 DIAGNOSIS — Z95.811 HEART REPLACED BY HEART ASSIST DEVICE: ICD-10-CM

## 2018-05-17 DIAGNOSIS — Z95.811 LVAD (LEFT VENTRICULAR ASSIST DEVICE) PRESENT: Primary | ICD-10-CM

## 2018-05-17 DIAGNOSIS — Z79.01 LONG TERM (CURRENT) USE OF ANTICOAGULANTS: ICD-10-CM

## 2018-05-17 DIAGNOSIS — I13.0 HYPERTENSIVE CARDIOVASCULAR-RENAL DISEASE, STAGE 1-4 OR UNSPECIFIED CHRONIC KIDNEY DISEASE, WITH HEART FAILURE: ICD-10-CM

## 2018-05-17 DIAGNOSIS — I42.0 DILATED CARDIOMYOPATHY: ICD-10-CM

## 2018-05-17 DIAGNOSIS — I10 HYPERTENSION, UNSPECIFIED TYPE: ICD-10-CM

## 2018-05-17 DIAGNOSIS — I50.9 HEART FAILURE: ICD-10-CM

## 2018-05-17 DIAGNOSIS — Z95.811 LVAD (LEFT VENTRICULAR ASSIST DEVICE) PRESENT: ICD-10-CM

## 2018-05-17 DIAGNOSIS — I42.9 CARDIOMYOPATHY: ICD-10-CM

## 2018-05-17 DIAGNOSIS — Z95.810 BIVENTRICULAR IMPLANTABLE CARDIOVERTER-DEFIBRILLATOR IN SITU: ICD-10-CM

## 2018-05-17 LAB
ALBUMIN SERPL BCP-MCNC: 3.8 G/DL
ALP SERPL-CCNC: 106 U/L
ALT SERPL W/O P-5'-P-CCNC: 12 U/L
ANION GAP SERPL CALC-SCNC: 14 MMOL/L
AST SERPL-CCNC: 22 U/L
BASOPHILS # BLD AUTO: 0.01 K/UL
BASOPHILS NFR BLD: 0.2 %
BILIRUB DIRECT SERPL-MCNC: 0.5 MG/DL
BILIRUB SERPL-MCNC: 0.8 MG/DL
BNP SERPL-MCNC: 1082 PG/ML
BUN SERPL-MCNC: 15 MG/DL
CALCIUM SERPL-MCNC: 10 MG/DL
CHLORIDE SERPL-SCNC: 101 MMOL/L
CO2 SERPL-SCNC: 24 MMOL/L
CREAT SERPL-MCNC: 1.8 MG/DL
CRP SERPL-MCNC: 32.3 MG/L
DIFFERENTIAL METHOD: ABNORMAL
EOSINOPHIL # BLD AUTO: 0.2 K/UL
EOSINOPHIL NFR BLD: 4 %
ERYTHROCYTE [DISTWIDTH] IN BLOOD BY AUTOMATED COUNT: 16.8 %
EST. GFR  (AFRICAN AMERICAN): 49.3 ML/MIN/1.73 M^2
EST. GFR  (NON AFRICAN AMERICAN): 42.6 ML/MIN/1.73 M^2
ESTIMATED PA SYSTOLIC PRESSURE: 55.45
GLOBAL PERICARDIAL EFFUSION: ABNORMAL
GLUCOSE SERPL-MCNC: 87 MG/DL
HCT VFR BLD AUTO: 36.6 %
HGB BLD-MCNC: 10.5 G/DL
IMM GRANULOCYTES # BLD AUTO: 0.02 K/UL
IMM GRANULOCYTES NFR BLD AUTO: 0.4 %
INR PPP: 2.5
LDH SERPL L TO P-CCNC: 393 U/L
LYMPHOCYTES # BLD AUTO: 1.1 K/UL
LYMPHOCYTES NFR BLD: 22.2 %
MAGNESIUM SERPL-MCNC: 2.3 MG/DL
MCH RBC QN AUTO: 24 PG
MCHC RBC AUTO-ENTMCNC: 28.7 G/DL
MCV RBC AUTO: 84 FL
MITRAL VALVE REGURGITATION: ABNORMAL
MONOCYTES # BLD AUTO: 0.6 K/UL
MONOCYTES NFR BLD: 11.7 %
NEUTROPHILS # BLD AUTO: 3 K/UL
NEUTROPHILS NFR BLD: 61.5 %
NRBC BLD-RTO: 0 /100 WBC
PHOSPHATE SERPL-MCNC: 3 MG/DL
PLATELET # BLD AUTO: 288 K/UL
PMV BLD AUTO: 10 FL
POTASSIUM SERPL-SCNC: 3.4 MMOL/L
PREALB SERPL-MCNC: 19 MG/DL
PROT SERPL-MCNC: 8.2 G/DL
PROTHROMBIN TIME: 23.9 SEC
RBC # BLD AUTO: 4.38 M/UL
RETIRED EF AND QEF - SEE NOTES: 8 (ref 55–65)
SODIUM SERPL-SCNC: 139 MMOL/L
TRICUSPID VALVE REGURGITATION: ABNORMAL
WBC # BLD AUTO: 4.95 K/UL

## 2018-05-17 PROCEDURE — 83880 ASSAY OF NATRIURETIC PEPTIDE: CPT

## 2018-05-17 PROCEDURE — 99214 OFFICE O/P EST MOD 30 MIN: CPT | Mod: S$GLB,,, | Performed by: INTERNAL MEDICINE

## 2018-05-17 PROCEDURE — 36415 COLL VENOUS BLD VENIPUNCTURE: CPT

## 2018-05-17 PROCEDURE — 84100 ASSAY OF PHOSPHORUS: CPT

## 2018-05-17 PROCEDURE — 85610 PROTHROMBIN TIME: CPT

## 2018-05-17 PROCEDURE — 83615 LACTATE (LD) (LDH) ENZYME: CPT

## 2018-05-17 PROCEDURE — 83735 ASSAY OF MAGNESIUM: CPT

## 2018-05-17 PROCEDURE — 86140 C-REACTIVE PROTEIN: CPT

## 2018-05-17 PROCEDURE — 82248 BILIRUBIN DIRECT: CPT

## 2018-05-17 PROCEDURE — 84134 ASSAY OF PREALBUMIN: CPT

## 2018-05-17 PROCEDURE — 93306 TTE W/DOPPLER COMPLETE: CPT | Mod: S$GLB,,, | Performed by: INTERNAL MEDICINE

## 2018-05-17 PROCEDURE — 80053 COMPREHEN METABOLIC PANEL: CPT

## 2018-05-17 PROCEDURE — 99999 PR PBB SHADOW E&M-EST. PATIENT-LVL III: CPT | Mod: PBBFAC,,, | Performed by: INTERNAL MEDICINE

## 2018-05-17 PROCEDURE — 85025 COMPLETE CBC W/AUTO DIFF WBC: CPT

## 2018-05-17 RX ORDER — POTASSIUM CHLORIDE 20 MEQ/1
20 TABLET, EXTENDED RELEASE ORAL DAILY
Qty: 30 TABLET | Refills: 6 | Status: ON HOLD | OUTPATIENT
Start: 2018-05-17 | End: 2018-06-16

## 2018-05-17 RX ORDER — FUROSEMIDE 40 MG/1
80 TABLET ORAL 2 TIMES DAILY
Qty: 120 TABLET | Refills: 11 | Status: ON HOLD | OUTPATIENT
Start: 2018-05-17 | End: 2018-06-16 | Stop reason: HOSPADM

## 2018-05-17 RX ORDER — HYDRALAZINE HYDROCHLORIDE 25 MG/1
50 TABLET, FILM COATED ORAL EVERY 8 HOURS
Qty: 180 TABLET | Refills: 11 | Status: SHIPPED | OUTPATIENT
Start: 2018-05-17 | End: 2018-06-06 | Stop reason: SDUPTHER

## 2018-05-17 NOTE — PATIENT INSTRUCTIONS
Today:     Take extra 60 mEq potassium (use your extra potassium at home).    Starting tomorrow:    Start potassium 20 MeQ daily- I sent a new script for this.    Increase hydralazine to 2 tablets three times a day (50mg 3xday).    Increase lasix to 80mg in AM and 40mg in PM.

## 2018-05-17 NOTE — LETTER
May 25, 2018        Nader Chiu  [address] [facility]  [address]  [place] [address]  Phone: [phone]  Fax: [phone]             [facility] [address] [place] [address]  Phone: [phone]   Patient: Tim Richards   MR Number: [id number]   YOB: 1965   Date of Visit: 5/17/2018       Dear Dr. Nader Chiu    Thank you for referring Tim Richards to me for evaluation. Attached you will find relevant portions of my assessment and plan of care.    If you have questions, please do not hesitate to call me. I look forward to following Tim Richards along with you.    Sincerely,    Amy Rhodes MD    Enclosure    If you would like to receive this communication electronically, please contact [email or web address] or [phone] to request [employer] access.    [employer] is a tool which provides read-only access to select patient information with whom you have a relationship. Its easy to use and provides real time access to review your patients record including encounter summaries, notes, results, and demographic information.    If you feel you have received this communication in error or would no longer like to receive these types of communications, please e-mail [email or web address]

## 2018-05-18 ENCOUNTER — ANTI-COAG VISIT (OUTPATIENT)
Dept: CARDIOLOGY | Facility: CLINIC | Age: 53
End: 2018-05-18

## 2018-05-18 DIAGNOSIS — Z95.811 LVAD (LEFT VENTRICULAR ASSIST DEVICE) PRESENT: ICD-10-CM

## 2018-05-18 DIAGNOSIS — Z79.01 LONG TERM (CURRENT) USE OF ANTICOAGULANTS: ICD-10-CM

## 2018-05-18 NOTE — PROGRESS NOTES
"Date of Implant with Heartmate II LVAD: 3/8/18    PATIENT ARRIVED IN CLINIC:  Ambulatory   Accompanied by: Self    Vitals  Doppler: 86/0  PAIN: 7 on 0-10 pain scale, location of pain: Neck and Right Shoulder, description of pain:   Is patient currently on medications for pain? yes What kind?    VAD Interrogation:  TXP SACHI INTERROGATIONS 5/17/2018   Type HeartMate II   Flow 6.1   Speed 9200   PI 5.2   Power (Jackson) 6.3   LSL 8800   Pulsatility No Pulse       Flow in history:   Waveforms: [id number]  History Log:     Problems / Issues / Alarms with VAD if any: None noted- increased putnam noted  Any Equipment Issues: None noted (Refer to  note for complete details)   Emergency Equipment With Patient: Yes    VAD Binder With Patient: yes   Reviewed VAD Numbers In Binder: yes  VAD Sounds: Smooth  Manual & Visual Inspection Of Driveline: No kinks or tears noted     LVAD Dressing/DLES:  Appearance Of Driveline: "1"   Antibiotics: NO  Frequency of Dressing Changes: daily & soap and water dressing  Stabilization Device In Use: yes, sun securement device     Pt In Need Of Management Kits? Yes - 1 box soap and water  It is medically necessary to have VAD management kits in order to prevent infection or to assist in the healing of an infected DLES.    Assessment:   Complaints/reason for visit today:routine  Complaints Of Nausea / Vomiting: None noted  Appearance and Frequency Of Stools: normal and formed without blood & daily  Color Of Urine: clear/yellow  Coping/Depression/Anxiety: coping okay  Sleep Habits: 7 hrs /night  Sleep Aids: None noted  Showering: No     Activity/Exercise: Yes- Walking and working   Driving: Yes. Reminded to pull over should there be an alarm before looking down at controller.    Labs:    Chemistry        Component Value Date/Time     05/17/2018 1230    K 3.4 (L) 05/17/2018 1230     05/17/2018 1230    CO2 24 05/17/2018 1230    BUN 15 05/17/2018 1230    CREATININE " 1.8 (H) 05/17/2018 1230    GLU 87 05/17/2018 1230        Component Value Date/Time    CALCIUM 10.0 05/17/2018 1230    ALKPHOS 106 05/17/2018 1230    AST 22 05/17/2018 1230    ALT 12 05/17/2018 1230    BILITOT 0.8 05/17/2018 1230    ESTGFRAFRICA 49.3 (A) 05/17/2018 1230    EGFRNONAA 42.6 (A) 05/17/2018 1230            Magnesium   Date Value Ref Range Status   05/17/2018 2.3 1.6 - 2.6 mg/dL Final       Lab Results   Component Value Date    WBC 4.95 05/17/2018    HGB 10.5 (L) 05/17/2018    HCT 36.6 (L) 05/17/2018    MCV 84 05/17/2018     05/17/2018       Lab Results   Component Value Date    INR 2.5 (H) 05/17/2018    INR 2.5 (H) 05/03/2018    INR 2.1 (H) 04/26/2018       BNP   Date Value Ref Range Status   05/17/2018 1,082 (H) 0 - 99 pg/mL Final     Comment:     Values of less than 100 pg/ml are consistent with non-CHF populations.   05/03/2018 1,079 (H) 0 - 99 pg/mL Final     Comment:     Values of less than 100 pg/ml are consistent with non-CHF populations.   04/19/2018 950 (H) 0 - 99 pg/mL Final     Comment:     Values of less than 100 pg/ml are consistent with non-CHF populations.       LD   Date Value Ref Range Status   05/17/2018 393 (H) 110 - 260 U/L Final     Comment:     Results are increased in hemolyzed samples.   05/03/2018 298 (H) 110 - 260 U/L Final     Comment:     Results are increased in hemolyzed samples.   04/19/2018 345 (H) 110 - 260 U/L Final     Comment:     Results are increased in hemolyzed samples.  *Result may be interfered by visible hemolysis         Labs reviewed with patient: YES     Patient Satisfaction Survey Completed by Patient: no  (explained about signature and box to check)  Medication reconciliation: per MA.  Purple card updated today: yes  Coumadin Managed by: [facility]    Education: Reviewed driveline care, emergency procedures, how to change the controller, alarms with patient, as well as discussed how to page the VAD coordinator in case of an emergency.       Plans/Needs: Pt noted to have some fluid overload symptoms. Patient's backup controller was set this clinic visit.     Per MD note: Today:Take extra 60 mEq potassium.  Tomorrow: Start potassium 20 MeQ daily.  Increase hydralazine to 50mg po TID.   Increase lasix to 80mg in AM and 40mg in PM.      Hurricane Season: Yes, discussed with patient: With hurricane season approaching, we want to make sure you are fully prepared for any emergency.  Should the National Weather Service or your local authorities recommend a voluntary or mandatory evacuation of your area, The VAD team requires you to evacuate to a safe place.  Remember, when it is a mandatory evacuation, traffic will become an issue for your limited battery power.  Therefore, we strongly urge you to evacuate early.    The VAD team advises you to have the following in place before hurricane season:  Have an evacuation plan in place including places to evacuate, names and phone numbers.  This information is required to be given to the VAD coordinator.   1. Have your VAD emergency contact numbers with you.   2. Make sure your prescriptions will not run out by the end of September.   3. Make sure you have enough medications, including pills, inhalers, patches, etc. to take, should you be gone for more than 2 weeks.   4. Make sure ALL of your batteries are fully charged.   5. Bring enough dressing change supplies to last for at least 2 weeks.   6. Bring your VAD binder with you.  Make sure your binder is updated and complete with alarms reference card, patient hand book, emergency contact numbers, daily log sheets, etc.  If you do not have family or friends as an evacuation destination, we recommend evacuating to a safe area.   Do NOT evacuate to [facility].    The VAD team wants to stress the importance of planning for your evacuation in the event of a hurricane.  If you have any LVAD questions or issues, please contact the LVAD coordinator.

## 2018-05-22 ENCOUNTER — ANTI-COAG VISIT (OUTPATIENT)
Dept: CARDIOLOGY | Facility: CLINIC | Age: 53
End: 2018-05-22

## 2018-05-22 DIAGNOSIS — Z95.811 LVAD (LEFT VENTRICULAR ASSIST DEVICE) PRESENT: ICD-10-CM

## 2018-05-22 DIAGNOSIS — Z79.01 LONG TERM (CURRENT) USE OF ANTICOAGULANTS: ICD-10-CM

## 2018-05-26 NOTE — PROGRESS NOTES
Subjective:   Patient ID:  Tim Richards is a 52 y.o. male who presents for LVAD followup visit.    Implant Date: 3/8/2018  VAD Type : HM II   VAD speed is at  9000 rpm.   Implanted for : DT , Declined for OHT due to:  Recent tobacco and alcohol        TXP SACHI INTERROGATIONS 5/17/2018   Type HeartMate II   Flow 6.1   Speed 9200   PI 5.2   Power (Jackson) 6.3   LSL 8800   Pulsatility No Pulse       HPI 51 yo man with nonischemic CHF s/p HeartMate II Implanted 3/8/18 as DT with repositioning of outflow graft 3/9/18.  S/p chest closure 3/10/18.  Patient is enrolled in PREVENT II and his speed was set at 9000 rpm. Here for routine VAD f/u.     Since his last visit, patient was seen 05/03 at which time he was having some swelling in his legs, speed was increased to 9200rpm, but there was consideration of further increasing his speed on his pump, therefore told to return today with repeat echocardiogram. Patient since last time seen continues to have increased fluid on board, he admits to not being compliant with diet and exercise regimen. Additionally blood pressure remains elevated, patient had been started on hydralazine last visit, some improvement but not significant in his blood pressure. That being said states he feels good, gout has been a complicating factor for him however. He is not interested in staying today, but admits his fluid level is still quite up. Patient did not have diuretic dose adjusted or increased last visit. Feels same amount of fluid as then, slightly increased even.    Echo today demonstrates similar findings to echo 3 weeks ago, patient with quite enlarged LV, with mod-severe Mr and elevated PA pressures however patient's volume status is nearly the same as last time.     Interrogation of device data reveals no alarms, normal flows and power (see VAD interrogation report for full details.)    Echo 05/17/18:    1 - HeartMate II LVAD; speed 9500 RPM - the aortic valve opens every 3rd to 4th  "beat.     2 - Severe left ventricular enlargement. LVEDD 8.8cm    3 - Severely depressed left ventricular systolic function (EF <10%).     4 - Right ventricular enlargement with moderately depressed systolic function.     5 - Biatrial enlargement.     6 - Moderate to severe mitral regurgitation.     7 - Pulmonary hypertension. The estimated PA systolic pressure is 55 mmHg.     8 - Small pericardial effusion.     9 - Increased central venous pressure.    Echo 4/12/18    1 - Moderate left ventricular enlargement.     2 - Severely depressed left ventricular systolic function (EF <10%). LVEDD 8.6    3 - Biatrial enlargement.     4 - Eccentric hypertrophy.     5 - Moderately depressed right ventricular systolic function .     6 - Moderate to severe mitral regurgitation.     7 - Mild tricuspid regurgitation.     8 - Increased central venous pressure.     9 - Pulmonary hypertension. The estimated PA systolic pressure is 47 mmHg.     10 - HeartMate II LVAD; speed 9000.     Review of Systems   Constitution: Negative for decreased appetite, weight gain and weight loss.   Cardiovascular: Negative for chest pain, dyspnea on exertion, leg swelling, near-syncope, orthopnea and palpitations.   Respiratory: Negative for cough and shortness of breath.    Musculoskeletal: Negative for myalgias.   Gastrointestinal: Negative for jaundice.       Objective: BP (!) 86/0 (BP Location: Left arm, Patient Position: Sitting, BP Method: Large (Manual))   Temp 98.6 °F (37 °C)   Ht 6' 1" (1.854 m)   Wt 112.5 kg (248 lb)   BMI 32.72 kg/m²          Physical Exam   Constitutional: He is oriented to person, place, and time. He appears well-developed and well-nourished. He is active. He is not intubated.   HENT:   Head: Normocephalic and atraumatic. Hair is normal.   Right Ear: External ear normal.   Left Ear: External ear normal.   Nose: Nose normal. No nasal deformity. No epistaxis.  No foreign bodies.   Mouth/Throat: Mucous membranes are " normal. Mucous membranes are not cyanotic. No oropharyngeal exudate.   Eyes: Conjunctivae and EOM are normal. Pupils are equal, round, and reactive to light.   Neck: Neck supple. JVD (12 cm H2O) present. No hepatojugular reflux present.   Cardiovascular: Normal rate, regular rhythm, normal heart sounds and normal pulses.  Exam reveals no gallop.    Normal VAD hum   Pulmonary/Chest: Effort normal and breath sounds normal. No apnea and no tachypnea. He is not intubated. No respiratory distress. He exhibits no tenderness.   Abdominal: Soft. Normal appearance and bowel sounds are normal. There is no tenderness. No hernia.   Musculoskeletal: Normal range of motion. He exhibits edema (1+ to ankles).   Neurological: He is alert and oriented to person, place, and time. He displays no seizure activity.   Skin: Skin is warm, dry and intact. No rash noted. No pallor.   Psychiatric: He has a normal mood and affect. His speech is normal and behavior is normal. Thought content normal. Cognition and memory are normal.   Nursing note and vitals reviewed.      Lab Results   Component Value Date    WBC 4.70 05/22/2018    HGB 10.6 (L) 05/22/2018    HCT 34.4 (L) 05/22/2018    MCV 78 (L) 05/22/2018     05/22/2018    CO2 28 05/22/2018    CO2 28 05/22/2018    CREATININE 1.7 (H) 05/22/2018    CREATININE 1.7 (H) 05/22/2018    CALCIUM 9.4 05/22/2018    CALCIUM 9.4 05/22/2018    ALBUMIN 4.2 05/22/2018    AST 25 05/22/2018    BNP 1,082 (H) 05/17/2018    ALT 16 (L) 05/22/2018     (H) 05/17/2018       Lab Results   Component Value Date    INR 2.9 (H) 05/22/2018    INR 2.5 (H) 05/17/2018    INR 2.5 (H) 05/03/2018       BNP   Date Value Ref Range Status   05/17/2018 1,082 (H) 0 - 99 pg/mL Final     Comment:     Values of less than 100 pg/ml are consistent with non-CHF populations.   05/03/2018 1,079 (H) 0 - 99 pg/mL Final     Comment:     Values of less than 100 pg/ml are consistent with non-CHF populations.   04/19/2018 950 (H) 0 -  99 pg/mL Final     Comment:     Values of less than 100 pg/ml are consistent with non-CHF populations.       LD   Date Value Ref Range Status   05/17/2018 393 (H) 110 - 260 U/L Final     Comment:     Results are increased in hemolyzed samples.   05/03/2018 298 (H) 110 - 260 U/L Final     Comment:     Results are increased in hemolyzed samples.   04/19/2018 345 (H) 110 - 260 U/L Final     Comment:     Results are increased in hemolyzed samples.  *Result may be interfered by visible hemolysis           Assessment:      1. LVAD (left ventricular assist device) present- hypervolemic on exam today   2. Heart replaced by heart assist device    3. Heart failure    4. Chronic systolic heart failure    5. Biventricular implantable cardioverter-defibrillator in situ    6. CKD (chronic kidney disease), stage III    7. Dilated cardiomyopathy    8. Hypertension, unspecified type    9. Long term (current) use of anticoagulants    10. Organ transplant candidate    11. PVT (paroxysmal ventricular tachycardia)    12. Cigarette nicotine dependence without complication    13. Alcohol abuse        Plan:   Complicating factor that his potassium is so low today and he needs diuresis. Therefore will do the following:  Today:Take extra 60 mEq potassium.  Tomorrow: Start potassium 20 MeQ daily.  Increase hydralazine to 50mg po TID.   Increase lasix to 80mg in AM and 40mg in PM.   Will get repeat labs at [facility] near his work on Monday, including repeat INR.  RTC 2 weeks with labs, would like to see BP and volume status significant improved before making any further changes in speed.   Any VAD management kits dispensed today medically necessary  Recommend 2 gram sodium restriction and 1500cc fluid restriction.  Encourage physical activity with graded exercise program.  Requested patient to weigh themselves daily, and to notify us if their weight increases by more than 3 lbs in 1 day or 5 lbs in 1 week.     Listed for transplant:  No    F/u 2 wk with labs, repeat echo post speed change,  visit, and interrogation, will then go to monthly visits,

## 2018-05-26 NOTE — PROCEDURES
TXP SACHI INTERROGATIONS 5/17/2018 5/3/2018 4/19/2018 4/12/2018 4/5/2018 3/29/2018 3/25/2018   Type HeartMate II HeartMate II HeartMate II HeartMate II HeartMate II HeartMate II -   Flow 6.1 6.3 6.3 6.0 6.85 6.4 -   Speed 9200 9000 9000 9000 9000 9000 -   PI 5.2 5.6 5.1 5.9 5.4 6.1 -   Power (Jackson) 6.3 5.6 5.9 5.7 6.0 6.0 -   LSL 8800 8600 8600 8600 8600 8600 -   Pulsatility No Pulse Intermittent pulse No Pulse Intermittent pulse Pulse Intermittent pulse Intermittent pulse   }

## 2018-05-30 ENCOUNTER — TELEPHONE (OUTPATIENT)
Dept: TRANSPLANT | Facility: CLINIC | Age: 53
End: 2018-05-30

## 2018-05-30 NOTE — TELEPHONE ENCOUNTER
Pt was to have labs drawn 5/28/18 per pt's wife.  Pt did not go, called pt to inquire about the appt.  No answer, left a message.      Called again 5/30/18, no answer, left another message for pt to call me back.

## 2018-06-01 NOTE — PROGRESS NOTES
LVAD also trying to reach patient. Unable to get INR week of 5/28. Will continue to try to reach patient

## 2018-06-05 ENCOUNTER — TELEPHONE (OUTPATIENT)
Dept: RESEARCH | Facility: HOSPITAL | Age: 53
End: 2018-06-05

## 2018-06-05 NOTE — TELEPHONE ENCOUNTER
Called patient to remind him of Prevent II 3 month follow up tomorrow 6/5/18. Patient did not answer. Left message to bring pill bottle with study drug to visit and will replace with new bottle at the visit.

## 2018-06-06 ENCOUNTER — ANTI-COAG VISIT (OUTPATIENT)
Dept: CARDIOLOGY | Facility: CLINIC | Age: 53
End: 2018-06-06

## 2018-06-06 ENCOUNTER — RESEARCH ENCOUNTER (OUTPATIENT)
Dept: RESEARCH | Facility: HOSPITAL | Age: 53
End: 2018-06-06

## 2018-06-06 ENCOUNTER — LAB VISIT (OUTPATIENT)
Dept: LAB | Facility: HOSPITAL | Age: 53
End: 2018-06-06
Attending: INTERNAL MEDICINE
Payer: COMMERCIAL

## 2018-06-06 ENCOUNTER — CLINICAL SUPPORT (OUTPATIENT)
Dept: TRANSPLANT | Facility: CLINIC | Age: 53
End: 2018-06-06
Payer: COMMERCIAL

## 2018-06-06 ENCOUNTER — OFFICE VISIT (OUTPATIENT)
Dept: TRANSPLANT | Facility: CLINIC | Age: 53
End: 2018-06-06
Payer: COMMERCIAL

## 2018-06-06 VITALS — TEMPERATURE: 99 F | SYSTOLIC BLOOD PRESSURE: 89 MMHG | HEIGHT: 73 IN | WEIGHT: 243 LBS | BODY MASS INDEX: 32.2 KG/M2

## 2018-06-06 DIAGNOSIS — Z95.811 LVAD (LEFT VENTRICULAR ASSIST DEVICE) PRESENT: ICD-10-CM

## 2018-06-06 DIAGNOSIS — I50.43 ACUTE ON CHRONIC COMBINED SYSTOLIC AND DIASTOLIC HEART FAILURE: ICD-10-CM

## 2018-06-06 DIAGNOSIS — I50.9 HEART FAILURE: ICD-10-CM

## 2018-06-06 DIAGNOSIS — Z95.811 HEART REPLACED BY HEART ASSIST DEVICE: ICD-10-CM

## 2018-06-06 DIAGNOSIS — Z95.811 HEART REPLACED BY HEART ASSIST DEVICE: Primary | ICD-10-CM

## 2018-06-06 DIAGNOSIS — Z79.01 LONG TERM (CURRENT) USE OF ANTICOAGULANTS: ICD-10-CM

## 2018-06-06 DIAGNOSIS — I13.0 HYPERTENSIVE CARDIOVASCULAR-RENAL DISEASE, STAGE 1-4 OR UNSPECIFIED CHRONIC KIDNEY DISEASE, WITH HEART FAILURE: ICD-10-CM

## 2018-06-06 DIAGNOSIS — I10 HYPERTENSION, UNSPECIFIED TYPE: ICD-10-CM

## 2018-06-06 DIAGNOSIS — I47.29 PVT (PAROXYSMAL VENTRICULAR TACHYCARDIA): ICD-10-CM

## 2018-06-06 DIAGNOSIS — Z00.6 EXAMINATION OF PARTICIPANT IN CLINICAL TRIAL: ICD-10-CM

## 2018-06-06 LAB
ALBUMIN SERPL BCP-MCNC: 4 G/DL
ALP SERPL-CCNC: 102 U/L
ALT SERPL W/O P-5'-P-CCNC: 21 U/L
ANION GAP SERPL CALC-SCNC: 12 MMOL/L
AST SERPL-CCNC: 23 U/L
BASOPHILS # BLD AUTO: 0.02 K/UL
BASOPHILS NFR BLD: 0.4 %
BILIRUB DIRECT SERPL-MCNC: 0.4 MG/DL
BILIRUB SERPL-MCNC: 0.6 MG/DL
BNP SERPL-MCNC: 987 PG/ML
BUN SERPL-MCNC: 19 MG/DL
CALCIUM SERPL-MCNC: 9.8 MG/DL
CHLORIDE SERPL-SCNC: 101 MMOL/L
CO2 SERPL-SCNC: 25 MMOL/L
CREAT SERPL-MCNC: 2 MG/DL
CRP SERPL-MCNC: 8.7 MG/L
DIFFERENTIAL METHOD: ABNORMAL
EOSINOPHIL # BLD AUTO: 0.1 K/UL
EOSINOPHIL NFR BLD: 1 %
ERYTHROCYTE [DISTWIDTH] IN BLOOD BY AUTOMATED COUNT: 16.3 %
EST. GFR  (AFRICAN AMERICAN): 43.4 ML/MIN/1.73 M^2
EST. GFR  (NON AFRICAN AMERICAN): 37.5 ML/MIN/1.73 M^2
GLUCOSE SERPL-MCNC: 97 MG/DL
HCT VFR BLD AUTO: 37.6 %
HGB BLD-MCNC: 10.4 G/DL
IMM GRANULOCYTES # BLD AUTO: 0.02 K/UL
IMM GRANULOCYTES NFR BLD AUTO: 0.4 %
INR PPP: 1.9
LDH SERPL L TO P-CCNC: 264 U/L
LYMPHOCYTES # BLD AUTO: 1 K/UL
LYMPHOCYTES NFR BLD: 19.7 %
MAGNESIUM SERPL-MCNC: 2.1 MG/DL
MCH RBC QN AUTO: 23.2 PG
MCHC RBC AUTO-ENTMCNC: 27.7 G/DL
MCV RBC AUTO: 84 FL
MONOCYTES # BLD AUTO: 0.6 K/UL
MONOCYTES NFR BLD: 12.4 %
NEUTROPHILS # BLD AUTO: 3.3 K/UL
NEUTROPHILS NFR BLD: 66.1 %
NRBC BLD-RTO: 0 /100 WBC
PHOSPHATE SERPL-MCNC: 3.8 MG/DL
PLATELET # BLD AUTO: 295 K/UL
PMV BLD AUTO: 9.6 FL
POTASSIUM SERPL-SCNC: 4 MMOL/L
PREALB SERPL-MCNC: 25 MG/DL
PROT SERPL-MCNC: 8.3 G/DL
PROTHROMBIN TIME: 18.4 SEC
RBC # BLD AUTO: 4.49 M/UL
SODIUM SERPL-SCNC: 138 MMOL/L
WBC # BLD AUTO: 4.93 K/UL

## 2018-06-06 PROCEDURE — 84134 ASSAY OF PREALBUMIN: CPT

## 2018-06-06 PROCEDURE — 84100 ASSAY OF PHOSPHORUS: CPT

## 2018-06-06 PROCEDURE — 93750 INTERROGATION VAD IN PERSON: CPT | Mod: S$GLB,,, | Performed by: INTERNAL MEDICINE

## 2018-06-06 PROCEDURE — 83880 ASSAY OF NATRIURETIC PEPTIDE: CPT

## 2018-06-06 PROCEDURE — 85025 COMPLETE CBC W/AUTO DIFF WBC: CPT

## 2018-06-06 PROCEDURE — 36415 COLL VENOUS BLD VENIPUNCTURE: CPT

## 2018-06-06 PROCEDURE — 99999 PR PBB SHADOW E&M-EST. PATIENT-LVL I: CPT | Mod: PBBFAC,,,

## 2018-06-06 PROCEDURE — 86140 C-REACTIVE PROTEIN: CPT

## 2018-06-06 PROCEDURE — 80053 COMPREHEN METABOLIC PANEL: CPT

## 2018-06-06 PROCEDURE — 83735 ASSAY OF MAGNESIUM: CPT

## 2018-06-06 PROCEDURE — 82248 BILIRUBIN DIRECT: CPT

## 2018-06-06 PROCEDURE — 99214 OFFICE O/P EST MOD 30 MIN: CPT | Mod: S$GLB,,, | Performed by: INTERNAL MEDICINE

## 2018-06-06 PROCEDURE — 99999 PR PBB SHADOW E&M-EST. PATIENT-LVL III: CPT | Mod: PBBFAC,,, | Performed by: INTERNAL MEDICINE

## 2018-06-06 PROCEDURE — 85610 PROTHROMBIN TIME: CPT

## 2018-06-06 PROCEDURE — 83615 LACTATE (LD) (LDH) ENZYME: CPT

## 2018-06-06 RX ORDER — SPIRONOLACTONE 25 MG/1
25 TABLET ORAL DAILY
Qty: 90 TABLET | Refills: 3 | Status: SHIPPED | OUTPATIENT
Start: 2018-06-06 | End: 2018-08-09 | Stop reason: SDUPTHER

## 2018-06-06 RX ORDER — AMIODARONE HYDROCHLORIDE 200 MG/1
TABLET ORAL
Qty: 30 TABLET | Refills: 7 | Status: SHIPPED | OUTPATIENT
Start: 2018-06-06 | End: 2019-06-20 | Stop reason: SDUPTHER

## 2018-06-06 RX ORDER — SPIRONOLACTONE 25 MG/1
25 TABLET ORAL DAILY
Qty: 30 TABLET | Refills: 11 | Status: SHIPPED | OUTPATIENT
Start: 2018-06-06 | End: 2018-06-06 | Stop reason: SDUPTHER

## 2018-06-06 RX ORDER — HYDRALAZINE HYDROCHLORIDE 25 MG/1
50 TABLET, FILM COATED ORAL EVERY 8 HOURS
Qty: 180 TABLET | Refills: 11 | Status: SHIPPED | OUTPATIENT
Start: 2018-06-06 | End: 2018-06-08 | Stop reason: SDUPTHER

## 2018-06-06 RX ORDER — AMLODIPINE BESYLATE 10 MG/1
10 TABLET ORAL DAILY
Qty: 90 TABLET | Refills: 3 | Status: SHIPPED | OUTPATIENT
Start: 2018-06-06 | End: 2018-11-07 | Stop reason: SDUPTHER

## 2018-06-06 NOTE — PROCEDURES
TXP SACHI INTERROGATIONS 6/6/2018 5/17/2018 5/3/2018 4/19/2018 4/12/2018 4/5/2018 3/29/2018   Type HeartMate II HeartMate II HeartMate II HeartMate II HeartMate II HeartMate II HeartMate II   Flow 7.0 6.1 6.3 6.3 6.0 6.85 6.4   Speed 9200 9200 9000 9000 9000 9000 9000   PI 3.7 5.2 5.6 5.1 5.9 5.4 6.1   Power (Jackson) 6.6 6.3 5.6 5.9 5.7 6.0 6.0   LSL 8800 8800 8600 8600 8600 8600 8600   Pulsatility Pulse No Pulse Intermittent pulse No Pulse Intermittent pulse Pulse Intermittent pulse   }

## 2018-06-06 NOTE — PROGRESS NOTES
Kelly questioned and confirmed dose.  Reports patient missed 6/1 and 6/4 doses.  Wife stated she is unsure if patient had any greens. No other changes reported

## 2018-06-06 NOTE — PROGRESS NOTES
"Subjective:   Patient ID:  Tim Richards is a 52 y.o. male who presents for LVAD followup visit.      Implant Date:  3/8/2018  VAD Type : HM II   VAD speed is at 9200  rpm. (GHad been increased 5/3)   Implanted for : DT , declined transplant for alcohol and tobacco   Low voltage , has 1 battery that's bad, replacing battery next visit , knows not to use     No VAD alarms noted on interrogation occasional PI events .   BP is .  Doppler: 89   Map is   DLES is a "1" small scab .      INR is therapeutic at 1.9.   LDh is at baseline.264     TXP SACHI INTERROGATIONS 6/6/2018   Type HeartMate II   Flow 7.0   Speed 9200   PI 3.7   Power (Jackson) 6.6   LSL 8800   Pulsatility Pulse       HPI   l       HPI 51 yo man with nonischemic CHF s/p HeartMate II Implanted 3/8/18 as DT with repositioning of outflow graft 3/9/18.  S/p chest closure 3/10/18.  Patient is enrolled in PREVENT II and his speed was set at 9000 rpm. Here for routine VAD f/u.          He had been seen 5/3 /18 and wa volume overloaded, speed was increased to 9200 rpm. At that time there was consideration for further speed increase but he was overloaded and hypertensive at te time and was thus deferred for speed increase at that time. On last visit hydralazine and lasix were increased.     Today he returns for follow up . He states that he feels ok , some mild shortness of breath , can walk about 1/4 a mile before becoming dyspnic. He states that this is stable. Concerningly , he states that he has missed coumadin ojn Friday and Sunday night , he states that he has been out of his medications for 2 days. Echo today demonstrates similar findings to echo 3 weeks ago, patient with quite enlarged LV, with mod-severe Mr and elevated PA pressures however patient's volume status is nearly the same as last time.      Interrogation of device data reveals no alarms, normal flows and power (see VAD interrogation report for full details.)     Echo 05/17/18:    1 - HeartMate " "II LVAD; speed 9500 RPM - the aortic valve opens every 3rd to 4th beat.     2 - Severe left ventricular enlargement. LVEDD 8.8cm    3 - Severely depressed left ventricular systolic function (EF <10%).     4 - Right ventricular enlargement with moderately depressed systolic function.     5 - Biatrial enlargement.     6 - Moderate to severe mitral regurgitation.     7 - Pulmonary hypertension. The estimated PA systolic pressure is 55 mmHg.     8 - Small pericardial effusion.     9 - Increased central venous pressure.     Echo 4/12/18    1 - Moderate left ventricular enlargement.     2 - Severely depressed left ventricular systolic function (EF <10%). LVEDD 8.6    3 - Biatrial enlargement.     4 - Eccentric hypertrophy.     5 - Moderately depressed right ventricular systolic function .     6 - Moderate to severe mitral regurgitation.     7 - Mild tricuspid regurgitation.     8 - Increased central venous pressure.     9 - Pulmonary hypertension. The estimated PA systolic pressure is 47 mmHg.     10 - HeartMate II LVAD; speed 9000.       Review of Systems   Constitution: Negative for chills, decreased appetite, fever and weakness.   Cardiovascular: Positive for dyspnea on exertion. Negative for chest pain, irregular heartbeat, leg swelling, orthopnea, palpitations, paroxysmal nocturnal dyspnea and syncope.   Respiratory: Positive for shortness of breath. Negative for cough, sputum production and wheezing.    Skin: Negative for poor wound healing.   Gastrointestinal: Negative for bloating, abdominal pain, constipation, diarrhea, nausea and vomiting.   Psychiatric/Behavioral: Negative for altered mental status.       Objective:   Blood pressure (!) 89/0, temperature 98.8 °F (37.1 °C), temperature source Oral, height 6' 1" (1.854 m), weight 110.2 kg (243 lb).body mass index is 32.06 kg/m².    Doppler: see abobve    Physical Exam   Constitutional: He is oriented to person, place, and time. He appears well-developed and " well-nourished.   HENT:   Head: Normocephalic and atraumatic.   Eyes: Pupils are equal, round, and reactive to light.   Neck: Neck supple. No JVD present. No tracheal deviation present.   Cardiovascular: Normal rate and regular rhythm.  Exam reveals no gallop and no friction rub.    No murmur heard.  LVAD hum    Pulmonary/Chest: Effort normal and breath sounds normal. No respiratory distress. He has no wheezes. He exhibits no tenderness.   Abdominal: Soft. Bowel sounds are normal. He exhibits no distension. There is no tenderness. There is no rebound and no guarding.   Musculoskeletal: He exhibits no edema.   Neurological: He is alert and oriented to person, place, and time.   Skin: Skin is warm and dry. No erythema.   Psychiatric: He has a normal mood and affect.       Lab Results   Component Value Date    WBC 4.93 06/06/2018    HGB 10.4 (L) 06/06/2018    HCT 37.6 (L) 06/06/2018    MCV 84 06/06/2018     06/06/2018    CO2 25 06/06/2018    CREATININE 2.0 (H) 06/06/2018    CALCIUM 9.8 06/06/2018    ALBUMIN 4.0 06/06/2018    AST 23 06/06/2018     (H) 06/06/2018    ALT 21 06/06/2018     (H) 06/06/2018       Lab Results   Component Value Date    INR 1.9 (H) 06/06/2018    INR 2.9 (H) 05/22/2018    INR 2.5 (H) 05/17/2018       BNP   Date Value Ref Range Status   06/06/2018 987 (H) 0 - 99 pg/mL Final     Comment:     Values of less than 100 pg/ml are consistent with non-CHF populations.   05/17/2018 1,082 (H) 0 - 99 pg/mL Final     Comment:     Values of less than 100 pg/ml are consistent with non-CHF populations.   05/03/2018 1,079 (H) 0 - 99 pg/mL Final     Comment:     Values of less than 100 pg/ml are consistent with non-CHF populations.       LD   Date Value Ref Range Status   06/06/2018 264 (H) 110 - 260 U/L Final     Comment:     Results are increased in hemolyzed samples.   05/17/2018 393 (H) 110 - 260 U/L Final     Comment:     Results are increased in hemolyzed samples.   05/03/2018 298 (H)  110 - 260 U/L Final     Comment:     Results are increased in hemolyzed samples.       Labs were reviewed with the patient.          Assessment:      1. Heart replaced by heart assist device    2. Acute on chronic combined systolic and diastolic heart failure    3. LVAD (left ventricular assist device) present    4. Examination of participant in clinical trial    5. Hypertension, unspecified type    6. Hypertensive cardiovascular-renal disease, stage 1-4 or unspecified chronic kidney disease, with heart failure    7. Long term (current) use of anticoagulants    8. PVT (paroxysmal ventricular tachycardia)        Plan:     Missed antihypertensives as well as coumadin. I had a very simin discussion with him regarding his compliance. I explained that the risk of pump thrombosis/stroke is highest in patients with subtherapeutic INR and hypertension , and that he risks stroke, pump thrombosis , emboli, etc. Explained this again to him with wife on the phone    Will defer speed change at this time giving volume overload and hypertension. His volume is up, although down compared to last time. Will continue current medication regiment , if plan for RN visit and labs on Friday. If he is euvolemic and normotensive will consider at that time going up on speed.     Patient is now NYHA II  Recommend 2 gram sodium restriction and 1500cc fluid restriction.  Encourage physical activity with graded exercise program.  Requested patient to weigh themselves daily, and to notify us if their weight increases by more than 3 lbs in 1 day or 5 lbs in 1 week.     Listed for transplant: No    UNOS Patient Status  Functional Status: 80% - Normal activity with effort: some symptoms of disease  Physical Capacity: No Limitations  Working for Income: yes  If yes, working activity level: Working Full Time    I have personally taken the history and examined this patient and agree with the fellow's note as stated above. Additionally patient's  creatinine is slightly up further, with Cr at 2.0, baseline around 1.7/1.8.     Amy Rhodes MD

## 2018-06-06 NOTE — LETTER
June 6, 2018        Nader Chiu  [address]  [address]  [place] [address]  Phone: [phone]  Fax: [phone]             [facility] [address] [place] [address]  Phone: [phone]   Patient: Tim Richards   MR Number: [id number]   YOB: 1965   Date of Visit: 6/6/2018       Dear Dr. Nader Chiu    Thank you for referring Tim Richards to me for evaluation. Attached you will find relevant portions of my assessment and plan of care.    If you have questions, please do not hesitate to call me. I look forward to following Tim Richards along with you.    Sincerely,    Amy Rhodes MD    Enclosure    If you would like to receive this communication electronically, please contact [email or web address] or [phone] to request [employer] access.    [employer] is a tool which provides read-only access to select patient information with whom you have a relationship. Its easy to use and provides real time access to review your patients record including encounter summaries, notes, results, and demographic information.    If you feel you have received this communication in error or would no longer like to receive these types of communications, please e-mail [email or web address]

## 2018-06-06 NOTE — PROGRESS NOTES
Seen pt in clinic. Pt states he has a battery that is showing red on UBC. Pt did not bring in battery today, will bring it in at next visit. No other equipment issues at this time, will follow up at next visit.

## 2018-06-06 NOTE — PROGRESS NOTES
Study: Prevent II  Sponsor: [employer]  Follow-up Visit: 3 month  Date of Visit: [id number]    Patient wishes to continue in study: Yes  All study protocol required CRFs completed: Yes    Mr. Richards is here for the 3 month follow up visit. Current list of medications obtained and verified; he denies any hospitalizations, ED visits, or any other adverse events since previous study follow-up. All questions answered to his satisfaction and he will contact research staff if he has any questions or concerns. Next follow up visit is in 3 months. Patient returned study drug bottle and was dispensed a new bottle.     The following tests and procedures are related to research study:  VAD clinic visit, labs

## 2018-06-07 NOTE — PROGRESS NOTES
"Date of Implant with Heartmate II LVAD: 3/8/18    PATIENT ARRIVED IN CLINIC:  Ambulatory  Accompanied by: Self    Vitals  Doppler: 89/0  PAIN: 0 on 0-10 pain scale, location of pain: 0, description of pain: 0  Is patient currently on medications for pain? no What kind?    VAD Interrogation:  TXP SACHI INTERROGATIONS 6/6/2018   Type HeartMate II   Flow 7.0   Speed 9200   PI 3.7   Power (Jackson) 6.6   LSL 8800   Pulsatility Pulse       Flow in history:  Waveforms: [id number]  History Log:     Problems / Issues / Alarms with VAD if any: None noted  Any Equipment Issues: None noted (Refer to  note for complete details)   Emergency Equipment With Patient: Yes    VAD Binder With Patient: yes   Reviewed VAD Numbers In Binder: yes  VAD Sounds: Smooth  Manual & Visual Inspection Of Driveline: No kinks or tears noted     LVAD Dressing/DLES:  Appearance Of Driveline: "1" with Scab  Antibiotics: NO  Frequency of Dressing Changes: daily & soap and water dressing  Stabilization Device In Use: yes, sun securement device     Pt In Need Of Management Kits? Yes - 1 box soap and water  It is medically necessary to have VAD management kits in order to prevent infection or to assist in the healing of an infected DLES.    Assessment:   Complaints/reason for visit today:routine  Complaints Of Nausea / Vomiting: None noted  Appearance and Frequency Of Stools: normal and formed without blood & daily  Color Of Urine: clear/yellow  Coping/Depression/Anxiety: coping okay  Sleep Habits: 8 hrs /night  Sleep Aids: None noted  Showering: No     Activity/Exercise: walking 1 mile   Driving: Yes. Reminded to pull over should there be an alarm before looking down at controller.    Labs:    Chemistry        Component Value Date/Time     06/06/2018 0658    K 4.0 06/06/2018 0658     06/06/2018 0658    CO2 25 06/06/2018 0658    BUN 19 06/06/2018 0658    CREATININE 2.0 (H) 06/06/2018 0658    GLU 97 06/06/2018 0658      "   Component Value Date/Time    CALCIUM 9.8 06/06/2018 0658    ALKPHOS 102 06/06/2018 0658    AST 23 06/06/2018 0658    ALT 21 06/06/2018 0658    BILITOT 0.6 06/06/2018 0658    ESTGFRAFRICA 43.4 (A) 06/06/2018 0658    EGFRNONAA 37.5 (A) 06/06/2018 0658            Magnesium   Date Value Ref Range Status   06/06/2018 2.1 1.6 - 2.6 mg/dL Final       Lab Results   Component Value Date    WBC 4.93 06/06/2018    HGB 10.4 (L) 06/06/2018    HCT 37.6 (L) 06/06/2018    MCV 84 06/06/2018     06/06/2018       Lab Results   Component Value Date    INR 1.9 (H) 06/06/2018    INR 2.9 (H) 05/22/2018    INR 2.5 (H) 05/17/2018       BNP   Date Value Ref Range Status   06/06/2018 987 (H) 0 - 99 pg/mL Final     Comment:     Values of less than 100 pg/ml are consistent with non-CHF populations.   05/17/2018 1,082 (H) 0 - 99 pg/mL Final     Comment:     Values of less than 100 pg/ml are consistent with non-CHF populations.   05/03/2018 1,079 (H) 0 - 99 pg/mL Final     Comment:     Values of less than 100 pg/ml are consistent with non-CHF populations.       LD   Date Value Ref Range Status   06/06/2018 264 (H) 110 - 260 U/L Final     Comment:     Results are increased in hemolyzed samples.   05/17/2018 393 (H) 110 - 260 U/L Final     Comment:     Results are increased in hemolyzed samples.   05/03/2018 298 (H) 110 - 260 U/L Final     Comment:     Results are increased in hemolyzed samples.       Labs reviewed with patient: YES     Patient Satisfaction Survey Completed by Patient: no  (explained about signature and box to check)  Medication reconciliation: per MA.  Purple card updated today: yes  Coumadin Managed by: [facility]    Education: Reviewed driveline care, emergency procedures, how to change the controller, alarms with patient, as well as discussed how to page the VAD coordinator in case of an emergency.      Plans/Needs: RTC on Friday. Patient was out of medication therefore could not accurately assess fluid  "and hypertensive status. Per MD note, "Will defer speed change at this time giving volume overload and hypertension. His volume is up, although down compared to last time. Will continue current medication regiment , if plan for RN visit and labs on Friday. If he is euvolemic and normotensive will consider at that time going up on speed."         Hurricane Season: Yes, discussed with patient: With hurricane season approaching, we want to make sure you are fully prepared for any emergency.  Should the National Weather Service or your local authorities recommend a voluntary or mandatory evacuation of your area, The VAD team requires you to evacuate to a safe place.  Remember, when it is a mandatory evacuation, traffic will become an issue for your limited battery power.  Therefore, we strongly urge you to evacuate early.    The VAD team advises you to have the following in place before hurricane season:  Have an evacuation plan in place including places to evacuate, names and phone numbers.  This information is required to be given to the VAD coordinator.   1. Have your VAD emergency contact numbers with you.   2. Make sure your prescriptions will not run out by the end of September.   3. Make sure you have enough medications, including pills, inhalers, patches,   etc. to take, should you be gone for more than 2 weeks.   4. Make sure ALL of your batteries are fully charged.   5. Bring enough dressing change supplies to last for at least 2 weeks.   6. Bring your VAD binder with you.  Make sure your binder is updated and complete with alarms reference card, patient hand book, emergency contact numbers, daily log sheets, etc.  If you do not have family or friends as an evacuation destination, we recommend evacuating to a safe area.   Do NOT evacuate to [facility].    The VAD team wants to stress the importance of planning for your evacuation in the event of a hurricane.  If you have any LVAD questions or issues, " please contact the LVAD coordinator.

## 2018-06-08 ENCOUNTER — CLINICAL SUPPORT (OUTPATIENT)
Dept: TRANSPLANT | Facility: CLINIC | Age: 53
End: 2018-06-08
Payer: COMMERCIAL

## 2018-06-08 ENCOUNTER — TELEPHONE (OUTPATIENT)
Dept: TRANSPLANT | Facility: CLINIC | Age: 53
End: 2018-06-08

## 2018-06-08 ENCOUNTER — ANTI-COAG VISIT (OUTPATIENT)
Dept: CARDIOLOGY | Facility: CLINIC | Age: 53
End: 2018-06-08

## 2018-06-08 VITALS — SYSTOLIC BLOOD PRESSURE: 90 MMHG

## 2018-06-08 DIAGNOSIS — Z95.811 HEART REPLACED BY HEART ASSIST DEVICE: ICD-10-CM

## 2018-06-08 DIAGNOSIS — Z95.811 LVAD (LEFT VENTRICULAR ASSIST DEVICE) PRESENT: Primary | ICD-10-CM

## 2018-06-08 DIAGNOSIS — Z79.01 LONG TERM (CURRENT) USE OF ANTICOAGULANTS: ICD-10-CM

## 2018-06-08 DIAGNOSIS — I50.9 HEART FAILURE: ICD-10-CM

## 2018-06-08 DIAGNOSIS — Z95.811 LVAD (LEFT VENTRICULAR ASSIST DEVICE) PRESENT: ICD-10-CM

## 2018-06-08 PROCEDURE — 99999 PR PBB SHADOW E&M-EST. PATIENT-LVL I: CPT | Mod: PBBFAC,,,

## 2018-06-08 RX ORDER — HYDRALAZINE HYDROCHLORIDE 100 MG/1
100 TABLET, FILM COATED ORAL EVERY 8 HOURS
Qty: 90 TABLET | Refills: 6 | Status: SHIPPED | OUTPATIENT
Start: 2018-06-08 | End: 2019-01-04 | Stop reason: SDUPTHER

## 2018-06-08 NOTE — PROGRESS NOTES
Second low INR. INR low due to missed doses last week. S/w Dr. Bautista who advised increased dose and Repeat INR Monday. He advised 10mg today and tomorrow, then 7.5mg Sunday. INR stat on Monday

## 2018-06-08 NOTE — TELEPHONE ENCOUNTER
Blood pressure elevated to 90 doppler again today in clinic. We will increase hydralazine to 100mg three times a day and see how things go. He will return to see us in clinic next week for repeat doppler check. Will get labs on Monday to follow up on renal function as well.

## 2018-06-08 NOTE — PROGRESS NOTES
Pt had 2 low INRs.  Reviewed with Dr. Bautista, pt in need of IV heparin and admission.  Pt instructed and refuses admission.  PT verbalizes he has something important over the weekend he cannot miss.  Pt verbalizes understanding of risks.  Coumadin clinic to boost.

## 2018-06-08 NOTE — PROGRESS NOTES
Wife ricardo questioned an confirmed  correct dose,  Reports pt is eating less than usual. Bilateral leg swelling.  Potassium and lasix increased .  Pt has been eating less than usual and when he does eat he will have watermelon or honey dew melon .

## 2018-06-11 ENCOUNTER — ANTI-COAG VISIT (OUTPATIENT)
Dept: CARDIOLOGY | Facility: CLINIC | Age: 53
End: 2018-06-11

## 2018-06-11 DIAGNOSIS — Z79.01 LONG TERM (CURRENT) USE OF ANTICOAGULANTS: ICD-10-CM

## 2018-06-11 DIAGNOSIS — Z95.811 LVAD (LEFT VENTRICULAR ASSIST DEVICE) PRESENT: ICD-10-CM

## 2018-06-15 ENCOUNTER — HOSPITAL ENCOUNTER (OUTPATIENT)
Facility: HOSPITAL | Age: 53
Discharge: HOME | End: 2018-06-16
Attending: INTERNAL MEDICINE | Admitting: INTERNAL MEDICINE
Payer: COMMERCIAL

## 2018-06-15 DIAGNOSIS — T82.9XXA LEFT VENTRICULAR ASSIST DEVICE (LVAD) COMPLICATION: ICD-10-CM

## 2018-06-15 LAB
ANION GAP SERPL CALC-SCNC: 10 MMOL/L
BNP SERPL-MCNC: 762 PG/ML
BUN SERPL-MCNC: 21 MG/DL
CALCIUM SERPL-MCNC: 9.6 MG/DL
CHLORIDE SERPL-SCNC: 103 MMOL/L
CO2 SERPL-SCNC: 22 MMOL/L
CREAT SERPL-MCNC: 2 MG/DL
EST. GFR  (AFRICAN AMERICAN): 43.4 ML/MIN/1.73 M^2
EST. GFR  (NON AFRICAN AMERICAN): 37.5 ML/MIN/1.73 M^2
GLUCOSE SERPL-MCNC: 74 MG/DL
INR PPP: 2.6
POTASSIUM SERPL-SCNC: 3.8 MMOL/L
PROTHROMBIN TIME: 25.1 SEC
SODIUM SERPL-SCNC: 135 MMOL/L

## 2018-06-15 PROCEDURE — 83880 ASSAY OF NATRIURETIC PEPTIDE: CPT

## 2018-06-15 PROCEDURE — G0379 DIRECT REFER HOSPITAL OBSERV: HCPCS

## 2018-06-15 PROCEDURE — 80048 BASIC METABOLIC PNL TOTAL CA: CPT

## 2018-06-15 PROCEDURE — 85610 PROTHROMBIN TIME: CPT

## 2018-06-15 PROCEDURE — G0378 HOSPITAL OBSERVATION PER HR: HCPCS

## 2018-06-15 PROCEDURE — 36415 COLL VENOUS BLD VENIPUNCTURE: CPT

## 2018-06-15 PROCEDURE — 25000003 PHARM REV CODE 250: Performed by: STUDENT IN AN ORGANIZED HEALTH CARE EDUCATION/TRAINING PROGRAM

## 2018-06-15 RX ORDER — PANTOPRAZOLE SODIUM 40 MG/1
40 TABLET, DELAYED RELEASE ORAL DAILY
Status: DISCONTINUED | OUTPATIENT
Start: 2018-06-16 | End: 2018-06-16 | Stop reason: HOSPADM

## 2018-06-15 RX ORDER — FERROUS GLUCONATE 324(38)MG
324 TABLET ORAL
Status: DISCONTINUED | OUTPATIENT
Start: 2018-06-16 | End: 2018-06-16 | Stop reason: HOSPADM

## 2018-06-15 RX ORDER — BUMETANIDE 1 MG/1
2 TABLET ORAL ONCE
Status: COMPLETED | OUTPATIENT
Start: 2018-06-15 | End: 2018-06-15

## 2018-06-15 RX ORDER — LANOLIN ALCOHOL/MO/W.PET/CERES
400 CREAM (GRAM) TOPICAL 2 TIMES DAILY
Status: DISCONTINUED | OUTPATIENT
Start: 2018-06-15 | End: 2018-06-16 | Stop reason: HOSPADM

## 2018-06-15 RX ORDER — BUMETANIDE 1 MG/1
1 TABLET ORAL 2 TIMES DAILY
Status: DISCONTINUED | OUTPATIENT
Start: 2018-06-16 | End: 2018-06-16

## 2018-06-15 RX ORDER — ALLOPURINOL 100 MG/1
100 TABLET ORAL DAILY
Status: DISCONTINUED | OUTPATIENT
Start: 2018-06-16 | End: 2018-06-16 | Stop reason: HOSPADM

## 2018-06-15 RX ORDER — AMLODIPINE BESYLATE 10 MG/1
10 TABLET ORAL DAILY
Status: DISCONTINUED | OUTPATIENT
Start: 2018-06-16 | End: 2018-06-16 | Stop reason: HOSPADM

## 2018-06-15 RX ORDER — FUROSEMIDE 80 MG/1
80 TABLET ORAL 2 TIMES DAILY
Status: DISCONTINUED | OUTPATIENT
Start: 2018-06-15 | End: 2018-06-15

## 2018-06-15 RX ORDER — WARFARIN SODIUM 5 MG/1
5 TABLET ORAL DAILY
Status: DISCONTINUED | OUTPATIENT
Start: 2018-06-16 | End: 2018-06-16 | Stop reason: HOSPADM

## 2018-06-15 RX ORDER — AMIODARONE HYDROCHLORIDE 200 MG/1
200 TABLET ORAL DAILY
Status: DISCONTINUED | OUTPATIENT
Start: 2018-06-16 | End: 2018-06-16 | Stop reason: HOSPADM

## 2018-06-15 RX ORDER — SPIRONOLACTONE 25 MG/1
25 TABLET ORAL DAILY
Status: DISCONTINUED | OUTPATIENT
Start: 2018-06-16 | End: 2018-06-16 | Stop reason: HOSPADM

## 2018-06-15 RX ORDER — POTASSIUM CHLORIDE 20 MEQ/1
20 TABLET, EXTENDED RELEASE ORAL DAILY
Status: DISCONTINUED | OUTPATIENT
Start: 2018-06-16 | End: 2018-06-16

## 2018-06-15 RX ORDER — HYDRALAZINE HYDROCHLORIDE 50 MG/1
100 TABLET, FILM COATED ORAL EVERY 8 HOURS
Status: DISCONTINUED | OUTPATIENT
Start: 2018-06-15 | End: 2018-06-16 | Stop reason: HOSPADM

## 2018-06-15 RX ADMIN — Medication 400 MG: at 10:06

## 2018-06-15 RX ADMIN — HYDRALAZINE HYDROCHLORIDE 100 MG: 50 TABLET ORAL at 10:06

## 2018-06-15 RX ADMIN — BUMETANIDE 2 MG: 1 TABLET ORAL at 10:06

## 2018-06-16 VITALS
BODY MASS INDEX: 31.53 KG/M2 | TEMPERATURE: 99 F | WEIGHT: 237.88 LBS | RESPIRATION RATE: 16 BRPM | HEIGHT: 73 IN | HEART RATE: 87 BPM | SYSTOLIC BLOOD PRESSURE: 78 MMHG | OXYGEN SATURATION: 95 %

## 2018-06-16 DIAGNOSIS — E79.0 HYPERURICEMIA: ICD-10-CM

## 2018-06-16 LAB
ANION GAP SERPL CALC-SCNC: 9 MMOL/L
APTT BLDCRRT: 35.5 SEC
BASOPHILS # BLD AUTO: 0.01 K/UL
BASOPHILS NFR BLD: 0.2 %
BUN SERPL-MCNC: 21 MG/DL
CALCIUM SERPL-MCNC: 9.3 MG/DL
CHLORIDE SERPL-SCNC: 102 MMOL/L
CO2 SERPL-SCNC: 26 MMOL/L
CREAT SERPL-MCNC: 1.7 MG/DL
DIFFERENTIAL METHOD: ABNORMAL
EOSINOPHIL # BLD AUTO: 0 K/UL
EOSINOPHIL NFR BLD: 0.7 %
ERYTHROCYTE [DISTWIDTH] IN BLOOD BY AUTOMATED COUNT: 16.1 %
EST. GFR  (AFRICAN AMERICAN): 52.8 ML/MIN/1.73 M^2
EST. GFR  (NON AFRICAN AMERICAN): 45.7 ML/MIN/1.73 M^2
GLUCOSE SERPL-MCNC: 96 MG/DL
HCT VFR BLD AUTO: 35.6 %
HGB BLD-MCNC: 10.5 G/DL
IMM GRANULOCYTES # BLD AUTO: 0.01 K/UL
IMM GRANULOCYTES NFR BLD AUTO: 0.2 %
INR PPP: 2.4
LDH SERPL L TO P-CCNC: 224 U/L
LYMPHOCYTES # BLD AUTO: 0.9 K/UL
LYMPHOCYTES NFR BLD: 17 %
MAGNESIUM SERPL-MCNC: 2.6 MG/DL
MCH RBC QN AUTO: 23.9 PG
MCHC RBC AUTO-ENTMCNC: 29.5 G/DL
MCV RBC AUTO: 81 FL
MONOCYTES # BLD AUTO: 0.8 K/UL
MONOCYTES NFR BLD: 14.3 %
NEUTROPHILS # BLD AUTO: 3.8 K/UL
NEUTROPHILS NFR BLD: 67.6 %
NRBC BLD-RTO: 0 /100 WBC
PHOSPHATE SERPL-MCNC: 3.6 MG/DL
PLATELET # BLD AUTO: 252 K/UL
PMV BLD AUTO: 10.2 FL
POTASSIUM SERPL-SCNC: 3.6 MMOL/L
PROTHROMBIN TIME: 22.9 SEC
RBC # BLD AUTO: 4.39 M/UL
SODIUM SERPL-SCNC: 137 MMOL/L
WBC # BLD AUTO: 5.54 K/UL

## 2018-06-16 PROCEDURE — G0378 HOSPITAL OBSERVATION PER HR: HCPCS

## 2018-06-16 PROCEDURE — 36415 COLL VENOUS BLD VENIPUNCTURE: CPT

## 2018-06-16 PROCEDURE — 83615 LACTATE (LD) (LDH) ENZYME: CPT

## 2018-06-16 PROCEDURE — 80048 BASIC METABOLIC PNL TOTAL CA: CPT

## 2018-06-16 PROCEDURE — 83735 ASSAY OF MAGNESIUM: CPT

## 2018-06-16 PROCEDURE — 85610 PROTHROMBIN TIME: CPT

## 2018-06-16 PROCEDURE — 84100 ASSAY OF PHOSPHORUS: CPT

## 2018-06-16 PROCEDURE — 25000003 PHARM REV CODE 250: Performed by: STUDENT IN AN ORGANIZED HEALTH CARE EDUCATION/TRAINING PROGRAM

## 2018-06-16 PROCEDURE — 99217 PR OBSERVATION CARE DISCHARGE: CPT | Mod: ,,, | Performed by: INTERNAL MEDICINE

## 2018-06-16 PROCEDURE — 85730 THROMBOPLASTIN TIME PARTIAL: CPT

## 2018-06-16 PROCEDURE — 85025 COMPLETE CBC W/AUTO DIFF WBC: CPT

## 2018-06-16 RX ORDER — BUMETANIDE 1 MG/1
2 TABLET ORAL DAILY
Status: DISCONTINUED | OUTPATIENT
Start: 2018-06-16 | End: 2018-06-16

## 2018-06-16 RX ORDER — BUMETANIDE 1 MG/1
2 TABLET ORAL 2 TIMES DAILY
Status: DISCONTINUED | OUTPATIENT
Start: 2018-06-16 | End: 2018-06-16

## 2018-06-16 RX ORDER — POTASSIUM CHLORIDE 20 MEQ/1
20 TABLET, EXTENDED RELEASE ORAL 2 TIMES DAILY
Qty: 30 TABLET | Refills: 6 | Status: SHIPPED | OUTPATIENT
Start: 2018-06-16 | End: 2019-05-23 | Stop reason: SDUPTHER

## 2018-06-16 RX ORDER — POTASSIUM CHLORIDE 750 MG/1
TABLET, FILM COATED, EXTENDED RELEASE ORAL
Qty: 180 TABLET | Refills: 3 | Status: SHIPPED | OUTPATIENT
Start: 2018-06-16 | End: 2018-07-13

## 2018-06-16 RX ORDER — BUMETANIDE 1 MG/1
2 TABLET ORAL DAILY
Status: DISCONTINUED | OUTPATIENT
Start: 2018-06-17 | End: 2018-06-16 | Stop reason: HOSPADM

## 2018-06-16 RX ORDER — POTASSIUM CHLORIDE 20 MEQ/1
20 TABLET, EXTENDED RELEASE ORAL 2 TIMES DAILY
Status: DISCONTINUED | OUTPATIENT
Start: 2018-06-16 | End: 2018-06-16 | Stop reason: HOSPADM

## 2018-06-16 RX ORDER — BUMETANIDE 1 MG/1
1 TABLET ORAL ONCE
Status: COMPLETED | OUTPATIENT
Start: 2018-06-16 | End: 2018-06-16

## 2018-06-16 RX ORDER — BUMETANIDE 2 MG/1
2 TABLET ORAL DAILY
Qty: 30 TABLET | Refills: 11 | Status: SHIPPED | OUTPATIENT
Start: 2018-06-17 | End: 2019-05-23 | Stop reason: SDUPTHER

## 2018-06-16 RX ADMIN — AMLODIPINE BESYLATE 10 MG: 10 TABLET ORAL at 09:06

## 2018-06-16 RX ADMIN — AMIODARONE HYDROCHLORIDE 200 MG: 200 TABLET ORAL at 09:06

## 2018-06-16 RX ADMIN — SPIRONOLACTONE 25 MG: 25 TABLET, FILM COATED ORAL at 09:06

## 2018-06-16 RX ADMIN — BUMETANIDE 1 MG: 1 TABLET ORAL at 09:06

## 2018-06-16 RX ADMIN — HYDRALAZINE HYDROCHLORIDE 100 MG: 50 TABLET ORAL at 05:06

## 2018-06-16 RX ADMIN — PANTOPRAZOLE SODIUM 40 MG: 40 TABLET, DELAYED RELEASE ORAL at 09:06

## 2018-06-16 RX ADMIN — BUMETANIDE 1 MG: 1 TABLET ORAL at 10:06

## 2018-06-16 RX ADMIN — POTASSIUM CHLORIDE 20 MEQ: 1500 TABLET, EXTENDED RELEASE ORAL at 09:06

## 2018-06-16 RX ADMIN — FERROUS GLUCONATE TAB 324 MG (37.5 MG ELEMENTAL IRON) 324 MG: 324 (37.5 FE) TAB at 09:06

## 2018-06-16 RX ADMIN — ALLOPURINOL 100 MG: 100 TABLET ORAL at 09:06

## 2018-06-16 NOTE — PLAN OF CARE
Problem: Patient Care Overview  Goal: Individualization & Mutuality  Plan of care discussed with patient.  Patient ambulating independently, fall precautions in place.LVAD DP and numbers WNL, smooth LVAD hum. Patient has no complaints of pain. Discussed medications and care. VSS. AAOx4. Pt transitioned to 2md Bumex daily. Patient has no questions at this time. Will continue to monitor.

## 2018-06-16 NOTE — H&P
[facility]  Heart Transplant  H&P    Patient Name: Tim Richards  MRN: [id number]  Admission Date: 6/15/2018  Attending Physician: Antonio Hadley Jr.,*  Primary Care Provider: Ja Méndez MD  Principal Problem:<principal problem not specified>    Subjective:     History of Present Illness:  Mr. Richards is a 52 year old with NICM/chronic systolic heart failure s/p HMII (3/8/18, DT) presenting as direct admit with uncontrolled BP. He was recently seen in clinic on 6/6 and BP noted to be elevated at that time (doppler 89), also volume overloaded on his echo at that time. A repeat doppler on 6/8 was 90 and his hydralazine was increased to 100 tid. He returned for doppler check today and reportedly 98 and he was admitted for further management.   He reports feeling slightly run down but otherwise doing well. Denies headaches, vision changes, CP, palpitations, SOB/COLEMAN, PND, orthopnea, PILI. Denies any VAD alarms.    Initial doppler here is 82.    Past Medical History:   Diagnosis Date    CHF (congestive heart failure)     Dilated cardiomyopathy 1/10/2018    Disorder of kidney and ureter     CKD    Gout     HTN (hypertension)     Ventricular tachycardia (paroxysmal)        Past Surgical History:   Procedure Laterality Date    CARDIAC CATHETERIZATION  Dec. 2012    CARDIAC DEFIBRILLATOR PLACEMENT Left     CRRT-D    COLONOSCOPY N/A 3/6/2018    Procedure: COLONOSCOPY;  Surgeon: Alonso Bone MD;  Location: [address];  Service: Endoscopy;  Laterality: N/A;       Review of patient's allergies indicates:   Allergen Reactions    Aspirin Other (See Comments)     Prevent II patient- Do NOT order aspirin. Please call Yenny [phone] if patient is admitted to hospital    Lisinopril Anaphylaxis       Current Facility-Administered Medications   Medication    [START ON 6/16/2018] allopurinol tablet 100 mg    [START ON 6/16/2018] amiodarone tablet 200 mg    [START ON 6/16/2018]  amLODIPine tablet 10 mg    [START ON 6/16/2018] bumetanide tablet 1 mg    bumetanide tablet 2 mg    [START ON 6/16/2018] ferrous gluconate tablet 324 mg    hydrALAZINE tablet 100 mg    magnesium oxide tablet 400 mg    [START ON 6/16/2018] pantoprazole EC tablet 40 mg    [START ON 6/16/2018] potassium chloride SA CR tablet 20 mEq    [START ON 6/16/2018] spironolactone tablet 25 mg    [START ON 6/16/2018] warfarin (COUMADIN) tablet 5 mg     Family History     Problem Relation (Age of Onset)    Cancer Sister (54)    Coronary artery disease Father    Diabetes Father    Heart disease Father    Hypertension Father    No Known Problems Mother, Brother        Social History Main Topics    Smoking status: Former Smoker     Packs/day: 1.00     Years: 31.00     Types: Cigarettes     Quit date: 1/12/2018    Smokeless tobacco: Never Used      Comment: pt is quiting on his own - pt stated not qualified for program; 2/16 pt  quit on his own    Alcohol use No      Comment: one fifth of gin or rum/week    Drug use: No    Sexual activity: Yes     Partners: Female     Birth control/ protection: None      Comment: 10/5/17  with same partner 7 years      Review of Systems   All other systems reviewed and are negative.    Objective:     Vital Signs (Most Recent):  Temp: 98.2 °F (36.8 °C) (06/15/18 1900)  Pulse: 88 (06/15/18 2000)  Resp: 18 (06/15/18 1900)  BP: (!) 84/0 (06/15/18 2043)  SpO2: 97 % (06/15/18 1900) Vital Signs (24h Range):  Temp:  [98.2 °F (36.8 °C)] 98.2 °F (36.8 °C)  Pulse:  [71-88] 88  Resp:  [18] 18  SpO2:  [97 %] 97 %  BP: (84-98)/(0-74) 84/0     No data found.    There is no height or weight on file to calculate BMI.    No intake or output data in the 24 hours ending 06/15/18 2146    Physical Exam   Constitutional: He is oriented to person, place, and time. He appears well-nourished. No distress.   HENT:   Head: Atraumatic.   Eyes: EOM are normal.   Neck: Neck supple. JVD (midneck) present.    Cardiovascular:   VAD hum   Pulmonary/Chest: Effort normal and breath sounds normal. No respiratory distress. He has no wheezes. He has no rales.   Abdominal: Soft. Bowel sounds are normal. He exhibits no distension. There is no tenderness.   Musculoskeletal: He exhibits no edema.   Neurological: He is alert and oriented to person, place, and time. No cranial nerve deficit.   Skin: Skin is warm. Capillary refill takes less than 2 seconds. No erythema.   Psychiatric: He has a normal mood and affect.       Significant Labs:  CBC:  No results for input(s): WBC, RBC, HGB, HCT, PLT, MCV, MCH, MCHC in the last 168 hours.  BNP:    Recent Labs  Lab 06/11/18  1206   *     CMP:    Recent Labs  Lab 06/11/18  1206      CALCIUM 9.7   ALBUMIN 4.6   PROT 8.8*   *   K 3.4*   CO2 28   CL 96*   BUN 23*   CREATININE 2.1*   ALKPHOS 88   ALT 26   AST 32   BILITOT 0.8      Coagulation:     Recent Labs  Lab 06/11/18  1206   INR 2.5*     LDH:  No results for input(s): LDH in the last 72 hours.  Microbiology:  Microbiology Results (last 7 days)     ** No results found for the last 168 hours. **          I have reviewed all pertinent labs within the past 24 hours.    Assessment/Plan:     Left ventricular assist device (LVAD) complication    Procedure: Device Interrogation Including analysis of device parameters  Current Settings: Ventricular Assist Device  Review of device function is stable/unstable stable    TXP LVAD INTERROGATIONS 6/15/2018 6/6/2018 5/17/2018 5/3/2018 4/19/2018 4/12/2018 4/5/2018   Type HeartMate II - - - - - -   Flow 5.8 - - - - - -   Speed 9200 - - - - - -   PI 4.2 - - - - - -   Power (Jackson) 5.8 - - - - - -   LSL 8800 - - - - - -   Pulsatility - Pulse No Pulse Intermittent pulse No Pulse Intermittent pulse Pulse     HMII 3/8/18 as DT  INR goal 2-3, last therapeutic, continue coumadin  Off antiplatelets  MAP goal 60-80, uncontrolled, mildly elevated currently, will trend   Currently amlodipine 10,  hydralazine 100 tid, spirono 25  GDMT: no ACE/ARB due to CKD; on spironolactone  Appears mildly volume overloaded on exam; also overloaded last clinic visit  Will switch to bumex 2 tonight, monitor for response. Plan for discharge on 1-2 mg bid.              Yohan Rizvi MD  Heart Transplant  [facility]

## 2018-06-16 NOTE — PROGRESS NOTES
Patient is ready for discharge. Patient stable alert and oriented. VSS. IV removed. No complaints of pain or signs of distress. Discussed discharge plan. Reviewed medications and side effects, appointments, and answered questions with patient. Left per walking by himself.

## 2018-06-16 NOTE — PLAN OF CARE
Problem: Patient Care Overview  Goal: Individualization & Mutuality  Outcome: Ongoing (interventions implemented as appropriate)  POC reviewed with pt. VS and VAD numbers stable. Pt direct admit for uncontrolled BP and diuresis. Pt started on bumex tonight. Pt oriented to room and resting in bed with no complaints. Pt remains free from falls, trauma, injury; pt tolerating POC well. Will continue to monitor.

## 2018-06-16 NOTE — ASSESSMENT & PLAN NOTE
Procedure: Device Interrogation Including analysis of device parameters  Current Settings: Ventricular Assist Device  Review of device function is stable/unstable stable    TXP LVAD INTERROGATIONS 6/15/2018 6/6/2018 5/17/2018 5/3/2018 4/19/2018 4/12/2018 4/5/2018   Type HeartMate II - - - - - -   Flow 5.8 - - - - - -   Speed 9200 - - - - - -   PI 4.2 - - - - - -   Power (Jackson) 5.8 - - - - - -   LSL 8800 - - - - - -   Pulsatility - Pulse No Pulse Intermittent pulse No Pulse Intermittent pulse Pulse     HMII 3/8/18 as DT  INR goal 2-3, last therapeutic, continue coumadin  Off antiplatelets  MAP goal 60-80, uncontrolled, mildly elevated currently, will trend   Currently amlodipine 10, hydralazine 100 tid, spirono 25  GDMT: no ACE/ARB due to CKD; on spironolactone  Appears mildly volume overloaded on exam; also overloaded last clinic visit  Will switch to bumex 2 tonight, monitor for response. Plan for discharge on 1-2 mg bid.

## 2018-06-16 NOTE — HPI
Mr. Richards is a 52 year old with NICM/chronic systolic heart failure s/p HMII (3/8/18, DT) presenting as direct admit with uncontrolled BP. He was recently seen in clinic on 6/6 and BP noted to be elevated at that time (doppler 89), also volume overloaded on his echo at that time. A repeat doppler on 6/8 was 90 and his hydralazine was increased to 100 tid. He returned for doppler check today and reportedly 98 and he was admitted for further management.   He reports feeling slightly run down but otherwise doing well. Denies headaches, vision changes, CP, palpitations, SOB/COLEMAN, PND, orthopnea, PILI. Denies any VAD alarms.    Initial doppler here is 82.

## 2018-06-16 NOTE — PROGRESS NOTES
Pt direct admit with family at side.  Telemetry placed. LVAD inventory complete upon arrival. MD Belkys notified of arrival to 304. Pt on RA; VSS.  Pt and family oriented to room.  Pt resting in bed.  Bed in lowest position; siderails up x2; call bell in reach.  Admit orders implemented as ordered.  Plan of care reviewed.  Will continue to monitor.

## 2018-06-16 NOTE — ASSESSMENT & PLAN NOTE
Procedure: Device Interrogation Including analysis of device parameters  Current Settings: Ventricular Assist Device  Review of device function is stable/unstable stable    TXP LVAD INTERROGATIONS 6/16/2018 6/16/2018 6/16/2018 6/15/2018 6/6/2018 5/17/2018 5/3/2018   Type HeartMate II HeartMate II HeartMate II HeartMate II - - -   Flow 5.8 6 5.8 5.8 - - -   Speed 9200 9200 9200 9200 - - -   PI 3.8 3.8 4.1 4.2 - - -   Power (Jackson) 5.8 5.9 6.0 5.8 - - -   LSL 8800 - - 8800 - - -   Pulsatility Intermittent pulse - - - Pulse No Pulse Intermittent pulse     HMII 3/8/18 as DT  INR goal 2-3, last therapeutic, continue coumadin  Off antiplatelets  MAP goal 60-80, uncontrolled, mildly elevated currently, will trend   Currently amlodipine 10, hydralazine 100 tid, spirono 25  GDMT: no ACE/ARB due to CKD; on spironolactone  Appears mildly volume overloaded on exam; also overloaded last clinic visit  Will switch to bumex 2 tonight, responded well. Plan for discharge on 2 mg daily (from lasix 80 bid)  Encouraged compliance with home medications

## 2018-06-16 NOTE — HOSPITAL COURSE
He was monitored overnight. MAP 70-82, doing well. Noted mildly elevated JVP, given bumex 2 mg (swiched from lasix 80 bid) with good UOP overnight. Plan to discharge home on current BP regimen and bumex 2 mg daily. Continue K 20 bid.

## 2018-06-16 NOTE — DISCHARGE SUMMARY
"[facility]  Heart Transplant  Discharge Summary      Patient Name: Tim Richards  MRN: [id number]  Admission Date: 6/15/2018  Hospital Length of Stay: 0 days  Discharge Date and Time: 06/16/2018 11:07 AM  Attending Physician: Antonio Hadley Jr.,*   Discharging Provider: Yohan Rizvi MD  Primary Care Provider: Ja Méndez MD     HPI: Mr. Richards is a 52 year old with NICM/chronic systolic heart failure s/p HMII (3/8/18, DT) presenting as direct admit with uncontrolled BP. He was recently seen in clinic on 6/6 and BP noted to be elevated at that time (doppler 89), also volume overloaded on his echo at that time. A repeat doppler on 6/8 was 90 and his hydralazine was increased to 100 tid. He returned for doppler check today and reportedly 98 and he was admitted for further management.   He reports feeling slightly run down but otherwise doing well. Denies headaches, vision changes, CP, palpitations, SOB/COLEMAN, PND, orthopnea, PILI. Denies any VAD alarms.    Initial doppler here is 82.    * No surgery found *     Hospital Course: He was monitored overnight. MAP 70-82, doing well. Noted mildly elevated JVP, given bumex 2 mg (swiched from lasix 80 bid) with good UOP overnight. Plan to discharge home on current BP regimen and bumex 2 mg daily. Continue K 20 bid.     Physical Exam:  BP (!) 82/0 (BP Location: Left arm, Patient Position: Lying)   Pulse 85   Temp 98.9 °F (37.2 °C) (Oral)   Resp 16   Ht 6' 1" (1.854 m)   Wt 107.9 kg (237 lb 14 oz)   SpO2 95%   BMI 31.38 kg/m²   Constitutional: He is oriented to person, place, and time. He appears well-nourished. No distress.   HENT:   Head: Atraumatic.   Eyes: EOM are normal.   Neck: Neck supple. Mildly elevated JVP.   Cardiovascular:   VAD hum   Pulmonary/Chest: Effort normal and breath sounds normal. No respiratory distress. He has no wheezes. He has no rales.   Abdominal: Soft. Bowel sounds are normal. He exhibits no distension. " There is no tenderness.   Musculoskeletal: He exhibits no edema.   Neurological: He is alert and oriented to person, place, and time. No cranial nerve deficit.   Skin: Skin is warm. Capillary refill takes less than 2 seconds. No erythema.   Psychiatric: He has a normal mood and affect.     Significant Diagnostic Studies: Labs:   BMP:   Recent Labs  Lab 06/15/18  2201 06/16/18  0408   GLU 74 96   * 137   K 3.8 3.6    102   CO2 22* 26   BUN 21* 21*   CREATININE 2.0* 1.7*   CALCIUM 9.6 9.3   MG  --  2.6   , CMP   Recent Labs  Lab 06/15/18  2201 06/16/18  0408   * 137   K 3.8 3.6    102   CO2 22* 26   GLU 74 96   BUN 21* 21*   CREATININE 2.0* 1.7*   CALCIUM 9.6 9.3   ANIONGAP 10 9   ESTGFRAFRICA 43.4* 52.8*   EGFRNONAA 37.5* 45.7*   , CBC   Recent Labs  Lab 06/16/18  0409   WBC 5.54   HGB 10.5*   HCT 35.6*       and INR   Lab Results   Component Value Date    INR 2.4 (H) 06/16/2018    INR 2.6 (H) 06/15/2018    INR 2.5 (H) 06/11/2018       Pending Diagnostic Studies:     None        Final Active Diagnoses:    Diagnosis Date Noted POA    PRINCIPAL PROBLEM:  Left ventricular assist device (LVAD) complication [T82.9XXA] 06/15/2018 Yes      Problems Resolved During this Admission:    Diagnosis Date Noted Date Resolved POA      Discharged Condition: good    Disposition: Home or Self Care    Follow Up:    Patient Instructions:     Activity as tolerated       Medications:  Reconciled Home Medications:      Medication List      START taking these medications    bumetanide 2 MG tablet  Commonly known as:  BUMEX  Take 1 tablet (2 mg total) by mouth once daily.  Start taking on:  6/17/2018        CHANGE how you take these medications    potassium chloride SA 20 MEQ tablet  Commonly known as:  K-DUR,KLOR-CON  Take 1 tablet (20 mEq total) by mouth 2 (two) times daily.  What changed:  when to take this        CONTINUE taking these medications    allopurinol 100 MG tablet  Commonly known as:   ZYLOPRIM  Take 1 tablet (100 mg total) by mouth once daily.     amiodarone 200 MG Tab  Commonly known as:  PACERONE  TAKE 1 TABLET (200 MG TOTAL) BY MOUTH ONCE DAILY.     amLODIPine 10 MG tablet  Commonly known as:  NORVASC  TAKE 1 TABLET (10 MG TOTAL) BY MOUTH ONCE DAILY.     ferrous gluconate 324 MG tablet  Commonly known as:  FERGON  Take 1 tablet (324 mg total) by mouth daily with breakfast.     hydrALAZINE 100 MG tablet  Commonly known as:  APRESOLINE  Take 1 tablet (100 mg total) by mouth every 8 (eight) hours.     INV aspirin/placebo tablet  Take 1 tablet (81 mg total) by mouth once daily.  For Investigational Use Only. Protocol # Prevent II     magnesium oxide 400 mg tablet  Commonly known as:  MAG-OX  Take 1 tablet (400 mg total) by mouth 3 (three) times daily.     pantoprazole 40 MG tablet  Commonly known as:  PROTONIX  Take 1 tablet (40 mg total) by mouth once daily.     spironolactone 25 MG tablet  Commonly known as:  ALDACTONE  Take 1 tablet (25 mg total) by mouth once daily.     warfarin 5 MG tablet  Commonly known as:  COUMADIN  Take 1 tablets (5mg) by mouth on Tues, Thurs, Sat. Take 1.5 tablets (7.5mg) all other days.        STOP taking these medications    furosemide 40 MG tablet  Commonly known as:  LASIX            Yohan Rizvi MD  Heart Transplant  [facility]

## 2018-06-16 NOTE — PROGRESS NOTES
Verified with Dr Khan that it was OK for pt to be D/C since Dr Hadley had seen pt. MD stated it was OK to D/C pt.

## 2018-06-18 RX ORDER — ALLOPURINOL 100 MG/1
100 TABLET ORAL DAILY
Qty: 30 TABLET | Refills: 2 | Status: SHIPPED | OUTPATIENT
Start: 2018-06-18 | End: 2018-09-19 | Stop reason: SDUPTHER

## 2018-07-07 ENCOUNTER — HOSPITAL ENCOUNTER (EMERGENCY)
Facility: HOSPITAL | Age: 53
Discharge: HOME | End: 2018-07-07
Attending: EMERGENCY MEDICINE
Payer: COMMERCIAL

## 2018-07-07 VITALS
HEIGHT: 73 IN | RESPIRATION RATE: 18 BRPM | BODY MASS INDEX: 32.07 KG/M2 | OXYGEN SATURATION: 100 % | TEMPERATURE: 98 F | WEIGHT: 242 LBS

## 2018-07-07 DIAGNOSIS — R05.9 COUGH: ICD-10-CM

## 2018-07-07 DIAGNOSIS — B34.9 VIRAL INFECTION: Primary | ICD-10-CM

## 2018-07-07 PROCEDURE — 96372 THER/PROPH/DIAG INJ SC/IM: CPT

## 2018-07-07 PROCEDURE — 99283 EMERGENCY DEPT VISIT LOW MDM: CPT | Mod: 25

## 2018-07-07 PROCEDURE — 63600175 PHARM REV CODE 636 W HCPCS: Performed by: EMERGENCY MEDICINE

## 2018-07-07 RX ORDER — BETAMETHASONE SODIUM PHOSPHATE AND BETAMETHASONE ACETATE 3; 3 MG/ML; MG/ML
12 INJECTION, SUSPENSION INTRA-ARTICULAR; INTRALESIONAL; INTRAMUSCULAR; SOFT TISSUE
Status: COMPLETED | OUTPATIENT
Start: 2018-07-07 | End: 2018-07-07

## 2018-07-07 RX ADMIN — BETAMETHASONE SODIUM PHOSPHATE AND BETAMETHASONE ACETATE 12 MG: 3; 3 INJECTION, SUSPENSION INTRA-ARTICULAR; INTRALESIONAL; INTRAMUSCULAR at 12:07

## 2018-07-07 NOTE — ED PROVIDER NOTES
Encounter Date: 7/7/2018     This is a 52 y.o. male complaining of productive cough that began about three weeks ago. He states that symptoms began as a  Patient with history of heart failure. LVAD present - in use since 3/8/18.    I have evaluated and conducted a medical screening exam with initial orders entered, if indicated, to expedite care. The patient will be placed in a treatment area when one is available. Care will be transferred to an alternate provider for a full assessment including but not limited to: history, physical exam, additional orders, and final disposition.    Hubert Barrera NP      SCRIBE #1 NOTE: I, Lebron Montanez, am scribing for, and in the presence of,  Kelly Bauer MD. I have scribed the following portions of the note - Other sections scribed: HPI, ROS.       History     Chief Complaint   Patient presents with    Cough     pt says he had a sore throat about 3 weeks ago and now hes coughing up phlegm, denies fever or chest pain     CC: Cough    HPI: This 52 y.o male with PMHx dilated cardiomyopathy now with LVAD, paroxysmal ventricular tachycardia,  in place presents to the ED c/o acute onset, constant productive cough that began x3 weeks ago. Pt reports sx are worse at night, and denies previous hx. He notes taking Mucinex as cough medication with no relief of sx. No modifying factors. Otherwise, pt denies fever, chills, SOB, CP and any other associated sx.       The history is provided by the patient and the spouse. No  was used.     Review of patient's allergies indicates:   Allergen Reactions    Aspirin Other (See Comments)     Prevent II patient- Do NOT order aspirin. Please call Yenny [phone] if patient is admitted to hospital    Lisinopril Anaphylaxis     Past Medical History:   Diagnosis Date    CHF (congestive heart failure)     Dilated cardiomyopathy 1/10/2018    Disorder of kidney and ureter     CKD    Gout     HTN (hypertension)      Ventricular tachycardia (paroxysmal)      Past Surgical History:   Procedure Laterality Date    CARDIAC CATHETERIZATION  Dec. 2012    CARDIAC DEFIBRILLATOR PLACEMENT Left     CRRT-D     Family History   Problem Relation Age of Onset    Hypertension Father     Diabetes Father     Coronary artery disease Father     Heart disease Father         CHF    No Known Problems Mother     Cancer Sister 54        breast CA    No Known Problems Brother      Social History     Tobacco Use    Smoking status: Former Smoker     Packs/day: 1.00     Years: 31.00     Pack years: 31.00     Types: Cigarettes     Last attempt to quit: 2018     Years since quittin.6    Smokeless tobacco: Never Used    Tobacco comment: pt is quiting on his own - pt stated not qualified for program;  pt  quit on his own   Substance Use Topics    Alcohol use: No     Alcohol/week: 0.0 oz     Comment: one fifth of gin or rum/week    Drug use: No     Review of Systems   Constitutional: Negative for chills and fever.   HENT: Negative for congestion, ear pain, rhinorrhea and sore throat.    Eyes: Negative for pain and visual disturbance.   Respiratory: Positive for cough (+) productive. Negative for shortness of breath.    Cardiovascular: Negative for chest pain.   Gastrointestinal: Negative for abdominal pain, diarrhea, nausea and vomiting.   Genitourinary: Negative for dysuria.   Musculoskeletal: Negative for back pain and neck pain.   Skin: Negative for rash.   Neurological: Negative for headaches.   All other systems reviewed and are negative.      Physical Exam     Initial Vitals [18 1016]   BP Pulse Resp Temp SpO2   -- -- 18 98.5 °F (36.9 °C) 98 %      MAP       --         Physical Exam  Constitutional: Well-developed, Well-nourished, No acute distressed, Alert  HENT: Normocephalic, Atraumatic, Moist mucous membranes, + congested, no sinus tenderness, Bilateral TM WNL  Eyes: Conjunctiva normal, PERRL, EOMI  Neck: Supple, ROM  normal, no meningismus  Cardiac: Loud LVAD whir  Pulmonary/Chest wall: Difficult exam given LVAD sound, no distress, no retractions, + cough, no obvious adventitial sounds  Lymph: No lower extremity edema  Neuro: oriented x 3, no focal neurologic deficit  Skin: Pink, warm, dry.  No rashes  Psych: Behavior normal, Mood and affect normal    Previous medical record and nursing documentation reviewed where available.            ED Course   Procedures  Labs Reviewed - No data to display       Imaging Results          X-Ray Chest PA And Lateral (Final result)  Result time 07/07/18 11:20:16    Final result by Krystina Oleary MD (07/07/18 11:20:16)                 Impression:      As above.      Electronically signed by: Krystina Oleary MD  Date:    07/07/2018  Time:    11:20             Narrative:    EXAMINATION:  XR CHEST PA AND LATERAL    CLINICAL HISTORY:  Cough    TECHNIQUE:  PA and lateral views of the chest were performed.    COMPARISON:  03/20/2018    FINDINGS:  Left-sided pacemaker device remains in place.  Left ventricular assist device is also seen.  There is cardiomegaly with pulmonary vascular congestion and mild interstitial edema.  No consolidation to suggest a pneumonia.  Probable small left-sided pleural effusion.                              X-Rays:   Independently Interpreted Readings:   Chest X-Ray: No infiltrates.  No acute abnormalities. LVAD device noted     Medical Decision Making:   History:   Old Medical Records: I decided to obtain old medical records.  Independently Interpreted Test(s):   I have ordered and independently interpreted X-rays - see prior notes.  Clinical Tests:   Radiological Study: Ordered and Reviewed  ED Management:  52 year old male with history of dilated cardiomyopathy and resultant LVAD presents to the ED with cough and congestion x 3 weeks.  No fevers.  Eating well.  No SOB.  Normal UOP.  No chest pain.  No improvement with OTC mucinex.  Lungs demonstrate no clear signs of  pneumonia but difficult to ascertain over LVAD sounds.  XRay of the chest demonstrates no consolidation.  I suspect viral illnes.  Given that patient is very limited to which OTC medications are used, I will provide a dose of steroids here to accelerate symptomatic improvement and recommend discharge home with supportive care.  I have encouraged patient to follow up closely with LVAD team and to return to the ED at any time if symptoms return.             Scribe Attestation:   Scribe #1: I performed the above scribed service and the documentation accurately describes the services I performed. I attest to the accuracy of the note.    Attending Attestation:           Physician Attestation for Scribe:  Physician Attestation Statement for Scribe #1: I, Kelly Bauer MD, reviewed documentation, as scribed by Lebron Montanez in my presence, and it is both accurate and complete.                    Clinical Impression:   The primary encounter diagnosis was Viral infection. A diagnosis of Cough was also pertinent to this visit.                             Kelly Bauer MD  08/24/18 [id number]

## 2018-07-07 NOTE — ED TRIAGE NOTES
52 y.o. Male presents to the ED with productive cough. Patient states cough and cold x3 weeks and has been persistent since. Patient has an LVAD placed in March. Patient states sputum is greenish-yellow. Patient denies any other symptoms such as CP, NVD, or recent injury. Patient resting in bed in [facility].

## 2018-07-09 ENCOUNTER — ANTI-COAG VISIT (OUTPATIENT)
Dept: CARDIOLOGY | Facility: CLINIC | Age: 53
End: 2018-07-09

## 2018-07-09 DIAGNOSIS — Z79.01 LONG TERM (CURRENT) USE OF ANTICOAGULANTS: ICD-10-CM

## 2018-07-09 DIAGNOSIS — Z95.811 LVAD (LEFT VENTRICULAR ASSIST DEVICE) PRESENT: ICD-10-CM

## 2018-07-13 ENCOUNTER — OFFICE VISIT (OUTPATIENT)
Dept: TRANSPLANT | Facility: CLINIC | Age: 53
End: 2018-07-13
Payer: COMMERCIAL

## 2018-07-13 ENCOUNTER — ANTI-COAG VISIT (OUTPATIENT)
Dept: CARDIOLOGY | Facility: CLINIC | Age: 53
End: 2018-07-13

## 2018-07-13 ENCOUNTER — LAB VISIT (OUTPATIENT)
Dept: LAB | Facility: HOSPITAL | Age: 53
End: 2018-07-13
Attending: INTERNAL MEDICINE
Payer: COMMERCIAL

## 2018-07-13 ENCOUNTER — CLINICAL SUPPORT (OUTPATIENT)
Dept: TRANSPLANT | Facility: CLINIC | Age: 53
End: 2018-07-13
Payer: COMMERCIAL

## 2018-07-13 VITALS
HEIGHT: 73 IN | TEMPERATURE: 99 F | BODY MASS INDEX: 31.94 KG/M2 | SYSTOLIC BLOOD PRESSURE: 88 MMHG | WEIGHT: 241 LBS | HEART RATE: 43 BPM

## 2018-07-13 DIAGNOSIS — Z95.811 HEART REPLACED BY HEART ASSIST DEVICE: ICD-10-CM

## 2018-07-13 DIAGNOSIS — I10 HYPERTENSION, UNSPECIFIED TYPE: ICD-10-CM

## 2018-07-13 DIAGNOSIS — I50.9 HEART FAILURE: ICD-10-CM

## 2018-07-13 DIAGNOSIS — Z79.01 LONG TERM (CURRENT) USE OF ANTICOAGULANTS: ICD-10-CM

## 2018-07-13 DIAGNOSIS — N18.30 CKD (CHRONIC KIDNEY DISEASE), STAGE III: ICD-10-CM

## 2018-07-13 DIAGNOSIS — I42.0 DILATED CARDIOMYOPATHY: ICD-10-CM

## 2018-07-13 DIAGNOSIS — Z95.811 LVAD (LEFT VENTRICULAR ASSIST DEVICE) PRESENT: ICD-10-CM

## 2018-07-13 DIAGNOSIS — I13.0 HYPERTENSIVE CARDIOVASCULAR-RENAL DISEASE, STAGE 1-4 OR UNSPECIFIED CHRONIC KIDNEY DISEASE, WITH HEART FAILURE: ICD-10-CM

## 2018-07-13 DIAGNOSIS — Z95.811 LVAD (LEFT VENTRICULAR ASSIST DEVICE) PRESENT: Primary | ICD-10-CM

## 2018-07-13 DIAGNOSIS — Z95.810 ICD (IMPLANTABLE CARDIOVERTER-DEFIBRILLATOR) IN PLACE: ICD-10-CM

## 2018-07-13 LAB
ALBUMIN SERPL BCP-MCNC: 4.2 G/DL
ALP SERPL-CCNC: 114 U/L
ALT SERPL W/O P-5'-P-CCNC: 31 U/L
ANION GAP SERPL CALC-SCNC: 10 MMOL/L
AST SERPL-CCNC: 29 U/L
BASOPHILS # BLD AUTO: 0.01 K/UL
BASOPHILS NFR BLD: 0.2 %
BILIRUB DIRECT SERPL-MCNC: 0.4 MG/DL
BILIRUB SERPL-MCNC: 0.6 MG/DL
BNP SERPL-MCNC: 790 PG/ML
BUN SERPL-MCNC: 29 MG/DL
CALCIUM SERPL-MCNC: 10 MG/DL
CHLORIDE SERPL-SCNC: 97 MMOL/L
CO2 SERPL-SCNC: 30 MMOL/L
CREAT SERPL-MCNC: 1.8 MG/DL
CRP SERPL-MCNC: 9.6 MG/L
DIFFERENTIAL METHOD: ABNORMAL
EOSINOPHIL # BLD AUTO: 0.1 K/UL
EOSINOPHIL NFR BLD: 1.1 %
ERYTHROCYTE [DISTWIDTH] IN BLOOD BY AUTOMATED COUNT: 16.9 %
EST. GFR  (AFRICAN AMERICAN): 49.3 ML/MIN/1.73 M^2
EST. GFR  (NON AFRICAN AMERICAN): 42.6 ML/MIN/1.73 M^2
GLUCOSE SERPL-MCNC: 88 MG/DL
HCT VFR BLD AUTO: 37.2 %
HGB BLD-MCNC: 10.8 G/DL
IMM GRANULOCYTES # BLD AUTO: 0.03 K/UL
IMM GRANULOCYTES NFR BLD AUTO: 0.5 %
INR PPP: 2.6
LDH SERPL L TO P-CCNC: 276 U/L
LYMPHOCYTES # BLD AUTO: 1.1 K/UL
LYMPHOCYTES NFR BLD: 17.2 %
MAGNESIUM SERPL-MCNC: 2.5 MG/DL
MCH RBC QN AUTO: 23.6 PG
MCHC RBC AUTO-ENTMCNC: 29 G/DL
MCV RBC AUTO: 81 FL
MONOCYTES # BLD AUTO: 0.6 K/UL
MONOCYTES NFR BLD: 9.4 %
NEUTROPHILS # BLD AUTO: 4.7 K/UL
NEUTROPHILS NFR BLD: 71.6 %
NRBC BLD-RTO: 0 /100 WBC
PHOSPHATE SERPL-MCNC: 2.8 MG/DL
PLATELET # BLD AUTO: 300 K/UL
PMV BLD AUTO: 9.9 FL
POTASSIUM SERPL-SCNC: 3.5 MMOL/L
PREALB SERPL-MCNC: 32 MG/DL
PROT SERPL-MCNC: 8.5 G/DL
PROTHROMBIN TIME: 25.6 SEC
RBC # BLD AUTO: 4.58 M/UL
SODIUM SERPL-SCNC: 137 MMOL/L
WBC # BLD AUTO: 6.61 K/UL

## 2018-07-13 PROCEDURE — 83735 ASSAY OF MAGNESIUM: CPT

## 2018-07-13 PROCEDURE — 83615 LACTATE (LD) (LDH) ENZYME: CPT

## 2018-07-13 PROCEDURE — 85025 COMPLETE CBC W/AUTO DIFF WBC: CPT

## 2018-07-13 PROCEDURE — 99214 OFFICE O/P EST MOD 30 MIN: CPT | Mod: S$GLB,,, | Performed by: INTERNAL MEDICINE

## 2018-07-13 PROCEDURE — 80053 COMPREHEN METABOLIC PANEL: CPT

## 2018-07-13 PROCEDURE — 93750 INTERROGATION VAD IN PERSON: CPT | Mod: S$GLB,,, | Performed by: INTERNAL MEDICINE

## 2018-07-13 PROCEDURE — 36415 COLL VENOUS BLD VENIPUNCTURE: CPT

## 2018-07-13 PROCEDURE — 82248 BILIRUBIN DIRECT: CPT

## 2018-07-13 PROCEDURE — 83880 ASSAY OF NATRIURETIC PEPTIDE: CPT

## 2018-07-13 PROCEDURE — 85610 PROTHROMBIN TIME: CPT

## 2018-07-13 PROCEDURE — 84100 ASSAY OF PHOSPHORUS: CPT

## 2018-07-13 PROCEDURE — 84134 ASSAY OF PREALBUMIN: CPT

## 2018-07-13 PROCEDURE — 86140 C-REACTIVE PROTEIN: CPT

## 2018-07-13 PROCEDURE — 99999 PR PBB SHADOW E&M-EST. PATIENT-LVL III: CPT | Mod: PBBFAC,,, | Performed by: INTERNAL MEDICINE
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
## 2018-07-13 NOTE — LETTER
July 23, 2018        Nader Chiu  [address]  [address]  [place] [address]  Phone: [phone]  Fax: [phone]             [facility] [address] [place] [address]  Phone: [phone]   Patient: Tim Richards   MR Number: [id number]   YOB: 1965   Date of Visit: 7/13/2018       Dear Dr. Nader Chiu    Thank you for referring Tim Richards to me for evaluation. Attached you will find relevant portions of my assessment and plan of care.    If you have questions, please do not hesitate to call me. I look forward to following Tim Richards along with you.    Sincerely,    Amy Rhodes MD    Enclosure    If you would like to receive this communication electronically, please contact [email or web address] or [phone] to request [employer] Link access.    [employer] Link is a tool which provides read-only access to select patient information with whom you have a relationship. Its easy to use and provides real time access to review your patients record including encounter summaries, notes, results, and demographic information.    If you feel you have received this communication in error or would no longer like to receive these types of communications, please e-mail [email or web address]

## 2018-07-18 ENCOUNTER — HOSPITAL ENCOUNTER (OUTPATIENT)
Dept: CARDIOLOGY | Facility: CLINIC | Age: 53
Discharge: HOME | End: 2018-07-18
Attending: INTERNAL MEDICINE
Payer: COMMERCIAL

## 2018-07-18 ENCOUNTER — CLINICAL SUPPORT (OUTPATIENT)
Dept: TRANSPLANT | Facility: CLINIC | Age: 53
End: 2018-07-18
Payer: COMMERCIAL

## 2018-07-18 ENCOUNTER — LAB VISIT (OUTPATIENT)
Dept: LAB | Facility: HOSPITAL | Age: 53
End: 2018-07-18
Attending: FAMILY MEDICINE
Payer: COMMERCIAL

## 2018-07-18 VITALS — SYSTOLIC BLOOD PRESSURE: 89 MMHG

## 2018-07-18 DIAGNOSIS — Z95.811 HEART REPLACED BY HEART ASSIST DEVICE: ICD-10-CM

## 2018-07-18 DIAGNOSIS — I50.9 HEART FAILURE: ICD-10-CM

## 2018-07-18 DIAGNOSIS — Z95.811 LVAD (LEFT VENTRICULAR ASSIST DEVICE) PRESENT: ICD-10-CM

## 2018-07-18 DIAGNOSIS — I42.9 CARDIOMYOPATHY: ICD-10-CM

## 2018-07-18 LAB
ALBUMIN SERPL BCP-MCNC: 3.7 G/DL
ALP SERPL-CCNC: 109 U/L
ALT SERPL W/O P-5'-P-CCNC: 23 U/L
ANION GAP SERPL CALC-SCNC: 11 MMOL/L
AST SERPL-CCNC: 26 U/L
BASOPHILS # BLD AUTO: 0.02 K/UL
BASOPHILS NFR BLD: 0.3 %
BILIRUB DIRECT SERPL-MCNC: 0.3 MG/DL
BILIRUB SERPL-MCNC: 0.8 MG/DL
BNP SERPL-MCNC: 1009 PG/ML
BUN SERPL-MCNC: 26 MG/DL
CALCIUM SERPL-MCNC: 9.7 MG/DL
CHLORIDE SERPL-SCNC: 101 MMOL/L
CO2 SERPL-SCNC: 27 MMOL/L
CREAT SERPL-MCNC: 1.7 MG/DL
CRP SERPL-MCNC: 11.4 MG/L
DIFFERENTIAL METHOD: ABNORMAL
EOSINOPHIL # BLD AUTO: 0 K/UL
EOSINOPHIL NFR BLD: 0.5 %
ERYTHROCYTE [DISTWIDTH] IN BLOOD BY AUTOMATED COUNT: 17.6 %
EST. GFR  (AFRICAN AMERICAN): 52.8 ML/MIN/1.73 M^2
EST. GFR  (NON AFRICAN AMERICAN): 45.7 ML/MIN/1.73 M^2
ESTIMATED PA SYSTOLIC PRESSURE: 33.6
GLUCOSE SERPL-MCNC: 87 MG/DL
HCT VFR BLD AUTO: 36.9 %
HGB BLD-MCNC: 10.9 G/DL
IMM GRANULOCYTES # BLD AUTO: 0.02 K/UL
IMM GRANULOCYTES NFR BLD AUTO: 0.3 %
INR PPP: 2.1
LDH SERPL L TO P-CCNC: 271 U/L
LYMPHOCYTES # BLD AUTO: 0.9 K/UL
LYMPHOCYTES NFR BLD: 14.7 %
MAGNESIUM SERPL-MCNC: 2.5 MG/DL
MCH RBC QN AUTO: 23.7 PG
MCHC RBC AUTO-ENTMCNC: 29.5 G/DL
MCV RBC AUTO: 80 FL
MITRAL VALVE MOBILITY: NORMAL
MITRAL VALVE REGURGITATION: ABNORMAL
MONOCYTES # BLD AUTO: 0.8 K/UL
MONOCYTES NFR BLD: 12.6 %
NEUTROPHILS # BLD AUTO: 4.4 K/UL
NEUTROPHILS NFR BLD: 71.6 %
NRBC BLD-RTO: 0 /100 WBC
PHOSPHATE SERPL-MCNC: 3.2 MG/DL
PLATELET # BLD AUTO: 266 K/UL
PMV BLD AUTO: 9.5 FL
POTASSIUM SERPL-SCNC: 3.5 MMOL/L
PREALB SERPL-MCNC: 29 MG/DL
PROT SERPL-MCNC: 7.7 G/DL
PROTHROMBIN TIME: 20.7 SEC
RBC # BLD AUTO: 4.59 M/UL
RETIRED EF AND QEF - SEE NOTES: 15 (ref 55–65)
SODIUM SERPL-SCNC: 139 MMOL/L
TRICUSPID VALVE REGURGITATION: ABNORMAL
WBC # BLD AUTO: 6.11 K/UL

## 2018-07-18 PROCEDURE — 93306 TTE W/DOPPLER COMPLETE: CPT | Mod: S$GLB,,, | Performed by: INTERNAL MEDICINE

## 2018-07-18 PROCEDURE — 99999 PR PBB SHADOW E&M-EST. PATIENT-LVL II: CPT | Mod: PBBFAC,,,

## 2018-07-18 PROCEDURE — 83735 ASSAY OF MAGNESIUM: CPT

## 2018-07-18 PROCEDURE — 84134 ASSAY OF PREALBUMIN: CPT

## 2018-07-18 PROCEDURE — 83615 LACTATE (LD) (LDH) ENZYME: CPT

## 2018-07-18 PROCEDURE — 36415 COLL VENOUS BLD VENIPUNCTURE: CPT

## 2018-07-18 PROCEDURE — 83880 ASSAY OF NATRIURETIC PEPTIDE: CPT

## 2018-07-18 PROCEDURE — 82248 BILIRUBIN DIRECT: CPT

## 2018-07-18 PROCEDURE — 85025 COMPLETE CBC W/AUTO DIFF WBC: CPT

## 2018-07-18 PROCEDURE — 80053 COMPREHEN METABOLIC PANEL: CPT

## 2018-07-18 PROCEDURE — 84100 ASSAY OF PHOSPHORUS: CPT

## 2018-07-18 PROCEDURE — 86140 C-REACTIVE PROTEIN: CPT

## 2018-07-18 PROCEDURE — 85610 PROTHROMBIN TIME: CPT

## 2018-07-19 ENCOUNTER — ANTI-COAG VISIT (OUTPATIENT)
Dept: CARDIOLOGY | Facility: CLINIC | Age: 53
End: 2018-07-19

## 2018-07-19 DIAGNOSIS — Z79.01 LONG TERM (CURRENT) USE OF ANTICOAGULANTS: ICD-10-CM

## 2018-07-19 DIAGNOSIS — Z95.811 LVAD (LEFT VENTRICULAR ASSIST DEVICE) PRESENT: ICD-10-CM

## 2018-07-20 ENCOUNTER — TELEPHONE (OUTPATIENT)
Dept: TRANSPLANT | Facility: CLINIC | Age: 53
End: 2018-07-20

## 2018-07-20 RX ORDER — DOXAZOSIN 2 MG/1
2 TABLET ORAL NIGHTLY
Qty: 30 TABLET | Refills: 11 | Status: SHIPPED | OUTPATIENT
Start: 2018-07-20 | End: 2018-08-09 | Stop reason: SDUPTHER

## 2018-07-20 NOTE — TELEPHONE ENCOUNTER
The patient's labs was reviewed by . I spoke with 's wife and told her of the patients' medication change. Doxazosin 2mg @ night was added to the patients' medication list. already called prescription in to the patients' preferred pharmacy to pick it up. Patient wife also was advised of appointment for a blood pressure check along with more lab work before hand. Patients' wife made his BP check for next Thursday at 3PM with labs at 2PM.

## 2018-07-23 NOTE — PROGRESS NOTES
Pt showed up for clinic visit for BP at 1630.  He states he didn't know he needed labs, but we corrected him and reminded him of conversation from last week.  Sent him to his echo and labs after.  Explained will have MD look at echo tomorrow and get back with him.    7/19/18: Sent message to Dr. Rhodes regarding BP and to review echo from today.  Not answered by the end of the day.  Wendy Collazo will follow up with Dr. Rhodes tomorrow as I am off.

## 2018-07-24 NOTE — PROGRESS NOTES
"Subjective:   Patient ID:  Tim Richards is a 52 y.o. male who presents for LVAD followup visit.      Implant Date:  3/8/2018  VAD Type : HM II   VAD speed is at 9200  rpm. (GHad been increased 5/3)   Implanted for : DT , declined transplant for alcohol and tobacco   Low voltage , has 1 battery that's bad, replacing battery next visit , knows not to use     No VAD alarms noted on interrogation occasional PI events .   BP is .  Doppler: 89   Map is   DLES is a "1" small scab .      INR is therapeutic at 1.9.   LDh is at baseline.264     TXP SACHI INTERROGATIONS 7/13/2018   Type HeartMate II   Flow 6.9   Speed 9200   PI 4.4   Power (Jackson) 6.5   LSL 8800   Pulsatility Pulse       HPI   l       HPI 53 yo man with nonischemic CHF s/p HeartMate II Implanted 3/8/18 as DT with repositioning of outflow graft 3/9/18.  S/p chest closure 3/10/18.  Patient is enrolled in PREVENT II and his speed was set at 9000 rpm. Here for routine VAD f/u.          He had been seen 5/3 /18 and wa volume overloaded, speed was increased to 9200 rpm. At that time there was consideration for further speed increase but he was overloaded and hypertensive at te time and was thus deferred for speed increase at that time. Today patient presents after admission for hypertension and some volume overload. We had tried to increase his blood pressure medications however were unsuccessful in getting it down, and he was admitted 06/15 for this. Stayed one day, had meds adjusted, diuresed slightly and discharged the next day.      Today he is is here for follow up. Having some shortness of breath, which is unchanged. Does feel some fatigue. Echo reviewed from before hospital stay, LVEDD quite enlarged, and we had discussed increasing speed in the past.     Interrogation of device data reveals no alarms, normal flows and power (see VAD interrogation report for full details.)     Echo 05/17/18:    1 - HeartMate II LVAD; speed 9500 RPM - the aortic valve " opens every 3rd to 4th beat.     2 - Severe left ventricular enlargement. LVEDD 8.8cm    3 - Severely depressed left ventricular systolic function (EF <10%).     4 - Right ventricular enlargement with moderately depressed systolic function.     5 - Biatrial enlargement.     6 - Moderate to severe mitral regurgitation.     7 - Pulmonary hypertension. The estimated PA systolic pressure is 55 mmHg.     8 - Small pericardial effusion.     9 - Increased central venous pressure.     Echo 4/12/18    1 - Moderate left ventricular enlargement.     2 - Severely depressed left ventricular systolic function (EF <10%). LVEDD 8.6    3 - Biatrial enlargement.     4 - Eccentric hypertrophy.     5 - Moderately depressed right ventricular systolic function .     6 - Moderate to severe mitral regurgitation.     7 - Mild tricuspid regurgitation.     8 - Increased central venous pressure.     9 - Pulmonary hypertension. The estimated PA systolic pressure is 47 mmHg.     10 - HeartMate II LVAD; speed 9000.       Review of Systems   Constitution: Positive for weight gain. Negative for chills, decreased appetite, diaphoresis, fever, weakness, malaise/fatigue and weight loss.   HENT: Negative for congestion.    Eyes: Negative for blurred vision and visual disturbance.   Cardiovascular: Positive for dyspnea on exertion. Negative for chest pain, irregular heartbeat, leg swelling, near-syncope, orthopnea, palpitations, paroxysmal nocturnal dyspnea and syncope.   Respiratory: Positive for shortness of breath. Negative for cough, sleep disturbances due to breathing, snoring, sputum production and wheezing.    Hematologic/Lymphatic: Negative for bleeding problem. Does not bruise/bleed easily.   Skin: Negative for poor wound healing and rash.   Musculoskeletal: Negative for arthritis, joint pain and muscle weakness.   Gastrointestinal: Negative for bloating, abdominal pain, anorexia, constipation, diarrhea, hematemesis, hematochezia, melena,  "nausea and vomiting.   Genitourinary: Negative for frequency and urgency.   Neurological: Negative for difficulty with concentration, excessive daytime sleepiness, dizziness, headaches and light-headedness.   Psychiatric/Behavioral: Negative for altered mental status and depression. The patient does not have insomnia and is not nervous/anxious.        Objective:   Blood pressure (!) 88/0, pulse (!) 43, temperature 98.6 °F (37 °C), temperature source Oral, height 6' 1" (1.854 m), weight 109.3 kg (241 lb).body mass index is 31.8 kg/m².    Doppler: see abobve    Physical Exam   Constitutional: He is oriented to person, place, and time. He appears well-developed and well-nourished. No distress.   HENT:   Head: Normocephalic and atraumatic.   Nose: Nose normal.   Mouth/Throat: Oropharynx is clear and moist. No oropharyngeal exudate.   Eyes: Conjunctivae and EOM are normal. Pupils are equal, round, and reactive to light. No scleral icterus.   Neck: Normal range of motion. Neck supple. JVD (10 cm h2O) present. No tracheal deviation present. No thyromegaly present.   Cardiovascular: Normal rate, regular rhythm, S1 normal, S2 normal and normal heart sounds.  Exam reveals no gallop and no friction rub.    No murmur heard.  LVAD hum    Pulmonary/Chest: Effort normal and breath sounds normal. No respiratory distress. He has no wheezes. He has no rales. He exhibits no tenderness.   Abdominal: Soft. Bowel sounds are normal. He exhibits no distension and no mass. There is no tenderness. There is no rebound and no guarding.   Musculoskeletal: Normal range of motion. He exhibits no edema or tenderness.   Neurological: He is alert and oriented to person, place, and time. Coordination normal.   Skin: Skin is warm and dry. No rash noted. He is not diaphoretic. No erythema. No pallor.   Psychiatric: He has a normal mood and affect. His behavior is normal. Judgment and thought content normal.   Nursing note and vitals reviewed.      Lab " Results   Component Value Date    WBC 6.11 07/18/2018    HGB 10.9 (L) 07/18/2018    HCT 36.9 (L) 07/18/2018    MCV 80 (L) 07/18/2018     07/18/2018    CO2 27 07/18/2018    CREATININE 1.7 (H) 07/18/2018    CALCIUM 9.7 07/18/2018    ALBUMIN 3.7 07/18/2018    AST 26 07/18/2018    BNP 1,009 (H) 07/18/2018    ALT 23 07/18/2018     (H) 07/18/2018       Lab Results   Component Value Date    INR 2.1 (H) 07/18/2018    INR 2.6 (H) 07/13/2018    INR 2.4 (H) 07/09/2018       BNP   Date Value Ref Range Status   07/18/2018 1,009 (H) 0 - 99 pg/mL Final     Comment:     Values of less than 100 pg/ml are consistent with non-CHF populations.   07/13/2018 790 (H) 0 - 99 pg/mL Final     Comment:     Values of less than 100 pg/ml are consistent with non-CHF populations.   06/15/2018 762 (H) 0 - 99 pg/mL Final     Comment:     Values of less than 100 pg/ml are consistent with non-CHF populations.       LD   Date Value Ref Range Status   07/18/2018 271 (H) 110 - 260 U/L Final     Comment:     Results are increased in hemolyzed samples.   07/13/2018 276 (H) 110 - 260 U/L Final     Comment:     Results are increased in hemolyzed samples.   06/16/2018 224 110 - 260 U/L Final     Comment:     Results are increased in hemolyzed samples.       Labs were reviewed with the patient.    Assessment:      1. LVAD (left ventricular assist device) present    2. Heart replaced by heart assist device    3. Heart failure    4. Hypertensive cardiovascular-renal disease, stage 1-4 or unspecified chronic kidney disease, with heart failure    5. Dilated cardiomyopathy    6. Hypertension, unspecified type    7. CKD (chronic kidney disease), stage III    8. Long term (current) use of anticoagulants    9. ICD (implantable cardioverter-defibrillator) in place        Plan:     Decided after discussion with him and looking at his fatigue, will go ahead and increase speed to 9400 rpm. Repeat echo and labs, clinic, blood pressure check in one week.  Patient will see how he does for 30 minutes and walk around and repeat blood pressure.   Blood pressure on repeat was stable, patient returned stating he felt great, and noticed an improvement in how he did walking around the hospital.   Will return to see us next week with echo, labs, blood pressure, clinic.     Patient is now NYHA II  Recommend 2 gram sodium restriction and 1500cc fluid restriction.  Encourage physical activity with graded exercise program.  Requested patient to weigh themselves daily, and to notify us if their weight increases by more than 3 lbs in 1 day or 5 lbs in 1 week.     Listed for transplant: No    UNOS Patient Status  Functional Status: 80% - Normal activity with effort: some symptoms of disease  Physical Capacity: No Limitations  Working for Income: yes  If yes, working activity level: Working Full Time

## 2018-07-24 NOTE — PROCEDURES
TXP SACHI INTERROGATIONS 7/13/2018 6/16/2018 6/6/2018 5/17/2018 5/3/2018 4/19/2018 4/12/2018   Type HeartMate II - HeartMate II HeartMate II HeartMate II HeartMate II HeartMate II   Flow 6.9 - 7.0 6.1 6.3 6.3 6.0   Speed 9200 - 9200 9200 9000 9000 9000   PI 4.4 - 3.7 5.2 5.6 5.1 5.9   Power (Jackson) 6.5 - 6.6 6.3 5.6 5.9 5.7   LSL 8800 - 8800 8800 8600 8600 8600   Pulsatility Pulse Intermittent pulse Pulse No Pulse Intermittent pulse No Pulse Intermittent pulse   }

## 2018-07-26 ENCOUNTER — CLINICAL SUPPORT (OUTPATIENT)
Dept: TRANSPLANT | Facility: CLINIC | Age: 53
End: 2018-07-26
Payer: COMMERCIAL

## 2018-07-26 ENCOUNTER — ANTI-COAG VISIT (OUTPATIENT)
Dept: CARDIOLOGY | Facility: CLINIC | Age: 53
End: 2018-07-26

## 2018-07-26 VITALS — SYSTOLIC BLOOD PRESSURE: 88 MMHG

## 2018-07-26 DIAGNOSIS — Z95.811 LVAD (LEFT VENTRICULAR ASSIST DEVICE) PRESENT: ICD-10-CM

## 2018-07-26 DIAGNOSIS — Z79.01 LONG TERM (CURRENT) USE OF ANTICOAGULANTS: ICD-10-CM

## 2018-07-26 PROCEDURE — 99999 PR PBB SHADOW E&M-EST. PATIENT-LVL I: CPT | Mod: PBBFAC,,,

## 2018-07-26 RX ORDER — DOXAZOSIN 2 MG/1
2 TABLET ORAL EVERY 12 HOURS
Qty: 60 TABLET | Refills: 11 | Status: CANCELLED | OUTPATIENT
Start: 2018-07-26 | End: 2019-07-26

## 2018-07-26 NOTE — PROGRESS NOTES
Wife confirmed dose. Reports no missed doses. But has been eating green leafy salads.  Started doxazosin (CARDURA) 2 MG tablet.  No other changes

## 2018-07-26 NOTE — PROGRESS NOTES
Pt seen for b/p check and labs. Doppler 88. Per Dr. Rhodes: increase Cardura to 2mg BID. 1 wk nurse visit with b/p check and labs (pt has appt 8/9). K 3.2- pt only taking KCl daily and sometimes misses doses. Instructed to take KCl twice daily. Educated on the importance of potassium. Pt verbalizes understanding.

## 2018-08-03 NOTE — PROGRESS NOTES
Patient reports he did not know about appointment on 8/2 and will not be able to go sooner than 8/9.

## 2018-08-09 ENCOUNTER — OFFICE VISIT (OUTPATIENT)
Dept: TRANSPLANT | Facility: CLINIC | Age: 53
End: 2018-08-09
Payer: COMMERCIAL

## 2018-08-09 ENCOUNTER — ANTI-COAG VISIT (OUTPATIENT)
Dept: CARDIOLOGY | Facility: CLINIC | Age: 53
End: 2018-08-09

## 2018-08-09 ENCOUNTER — CLINICAL SUPPORT (OUTPATIENT)
Dept: TRANSPLANT | Facility: CLINIC | Age: 53
End: 2018-08-09
Payer: COMMERCIAL

## 2018-08-09 VITALS — TEMPERATURE: 99 F | HEIGHT: 73 IN | SYSTOLIC BLOOD PRESSURE: 86 MMHG | BODY MASS INDEX: 31.81 KG/M2 | WEIGHT: 240 LBS

## 2018-08-09 DIAGNOSIS — Z79.01 LONG TERM (CURRENT) USE OF ANTICOAGULANTS: ICD-10-CM

## 2018-08-09 DIAGNOSIS — Z95.811 LVAD (LEFT VENTRICULAR ASSIST DEVICE) PRESENT: ICD-10-CM

## 2018-08-09 DIAGNOSIS — I50.9 HEART FAILURE: ICD-10-CM

## 2018-08-09 DIAGNOSIS — I13.0 HYPERTENSIVE CARDIOVASCULAR-RENAL DISEASE, STAGE 1-4 OR UNSPECIFIED CHRONIC KIDNEY DISEASE, WITH HEART FAILURE: Primary | ICD-10-CM

## 2018-08-09 DIAGNOSIS — Z95.811 HEART REPLACED BY HEART ASSIST DEVICE: ICD-10-CM

## 2018-08-09 DIAGNOSIS — N18.30 CKD (CHRONIC KIDNEY DISEASE), STAGE III: ICD-10-CM

## 2018-08-09 PROCEDURE — 99999 PR PBB SHADOW E&M-EST. PATIENT-LVL III: CPT | Mod: PBBFAC,,, | Performed by: INTERNAL MEDICINE

## 2018-08-09 PROCEDURE — 99214 OFFICE O/P EST MOD 30 MIN: CPT | Mod: S$GLB,,, | Performed by: INTERNAL MEDICINE

## 2018-08-09 RX ORDER — SPIRONOLACTONE 50 MG/1
50 TABLET, FILM COATED ORAL DAILY
Qty: 90 TABLET | Refills: 3 | Status: ON HOLD | OUTPATIENT
Start: 2018-08-09 | End: 2019-07-23 | Stop reason: HOSPADM

## 2018-08-09 RX ORDER — DOXAZOSIN 4 MG/1
4 TABLET ORAL NIGHTLY
Qty: 30 TABLET | Refills: 11 | Status: SHIPPED | OUTPATIENT
Start: 2018-08-09 | End: 2018-08-23

## 2018-08-09 NOTE — PATIENT INSTRUCTIONS
Take extra 20 meq of potassium today and tomorrow    Take aldactone 25 mg twice a day til you run out

## 2018-08-09 NOTE — LETTER
August 9, 2018        Nader Chiu  [address]  [address]  [place] [address]  Phone: [phone]  Fax: [phone]             [facility] [address] [place] [address]  Phone: [phone]   Patient: Tim Richards   MR Number: [id number]   YOB: 1965   Date of Visit: 8/9/2018       Dear Dr. Nader Chiu    Thank you for referring Tim Richards to me for evaluation. Attached you will find relevant portions of my assessment and plan of care.    If you have questions, please do not hesitate to call me. I look forward to following Tim Richards along with you.    Sincerely,    Bettye Connors MD    Enclosure    If you would like to receive this communication electronically, please contact [email or web address] or [phone] to request [employer] access.    [employer] Link is a tool which provides read-only access to select patient information with whom you have a relationship. Its easy to use and provides real time access to review your patients record including encounter summaries, notes, results, and demographic information.    If you feel you have received this communication in error or would no longer like to receive these types of communications, please e-mail [email or web address]

## 2018-08-09 NOTE — PROGRESS NOTES
"Subjective:   Patient ID:  Tim Richards is a 52 y.o. male who presents for LVAD followup visit.    Implant Date: 3/8/2018  Initials:RBB     Heartmate II RPM 9400  3/9/18 outflow graft changed     INR goal: 2-3   Bridge with Heparin   Antiplatelets: , prevent trial    TXP [facility] INTERROGATIONS 8/9/2018   Type HeartMate II   Flow 6.3   Speed 9400   PI 3.5   Power (Jackson) 6.4   LSL 9000   Pulsatility No Pulse       HPI  51 yo man with nonischemic CHF s/p HeartMate II Implanted 3/8/18 as DT with repositioning of outflow graft 3/9/18.  S/p chest closure 3/10/18.  Here for two week BP check after doppler was 88 in addition to taking his potassium consistently.    Today no alarms except for one external power source alarm when he discontinued from wall unit transiently. Otherwise he is doing very well as he works full time at  for a [employer].  No edema    Review of Systems   Constitution: Negative for decreased appetite, weight gain and weight loss.   Cardiovascular: Negative for chest pain, dyspnea on exertion, leg swelling, near-syncope, orthopnea and palpitations.   Respiratory: Negative for cough and shortness of breath.    Musculoskeletal: Negative for myalgias.   Gastrointestinal: Negative for jaundice.       Objective:     Doppler: 86     Physical Exam   Constitutional: He is oriented to person, place, and time. He appears well-developed and well-nourished. He is active. He is not intubated.   BP (!) 86/0 (BP Location: Right arm, Patient Position: Sitting, BP Method: Medium (Manual)) Comment: doppler  Temp 98.9 °F (37.2 °C) (Oral)   Ht 6' 1" (1.854 m)   Wt 108.9 kg (240 lb)   BMI 31.66 kg/m²      HENT:   Head: Normocephalic and atraumatic. Hair is normal.   Right Ear: External ear normal.   Left Ear: External ear normal.   Nose: Nose normal. No nasal deformity. No epistaxis.  No foreign bodies.   Mouth/Throat: Mucous membranes are normal. Mucous membranes are not cyanotic. No " oropharyngeal exudate.   Eyes: Conjunctivae and EOM are normal. Pupils are equal, round, and reactive to light.   Neck: Neck supple. No hepatojugular reflux and no JVD present.   Cardiovascular: Normal rate, regular rhythm, normal heart sounds and normal pulses.  Exam reveals no gallop.    Pulmonary/Chest: Effort normal and breath sounds normal. No apnea and no tachypnea. He is not intubated. No respiratory distress. He exhibits no tenderness.   Abdominal: Soft. Normal appearance and bowel sounds are normal. There is no tenderness. No hernia.   Musculoskeletal: Normal range of motion.   Neurological: He is alert and oriented to person, place, and time. He displays no seizure activity.   Skin: Skin is warm, dry and intact. No rash noted. No pallor.   Psychiatric: He has a normal mood and affect. His speech is normal and behavior is normal. Thought content normal. Cognition and memory are normal.       Lab Results   Component Value Date    WBC 4.20 08/09/2018    HGB 11.5 (L) 08/09/2018    HCT 36.9 (L) 08/09/2018    MCV 78 (L) 08/09/2018     08/09/2018    CO2 26 08/09/2018    CREATININE 1.8 (H) 08/09/2018    CALCIUM 8.9 08/09/2018    ALBUMIN 4.2 08/09/2018    AST 28 08/09/2018     (H) 08/09/2018    ALT 21 08/09/2018     (H) 08/09/2018       Lab Results   Component Value Date    INR 2.2 (H) 08/09/2018    INR 1.8 (H) 07/26/2018    INR 2.1 (H) 07/18/2018       BNP   Date Value Ref Range Status   08/09/2018 409 (H) 0 - 99 pg/mL Final     Comment:     Values of less than 100 pg/ml are consistent with non-CHF populations.   07/26/2018 411 (H) 0 - 99 pg/mL Final     Comment:     Values of less than 100 pg/ml are consistent with non-CHF populations.   07/18/2018 1,009 (H) 0 - 99 pg/mL Final     Comment:     Values of less than 100 pg/ml are consistent with non-CHF populations.       LD   Date Value Ref Range Status   08/09/2018 204 (H) 84 - 195 U/L Final     Comment:     Results are increased in hemolyzed  samples.   07/26/2018 202 (H) 84 - 195 U/L Final     Comment:     Results are increased in hemolyzed samples.   07/18/2018 271 (H) 110 - 260 U/L Final     Comment:     Results are increased in hemolyzed samples.       Labs were reviewed with the patient.          Assessment:      1. Hypertensive cardiovascular-renal disease, stage 1-4 or unspecified chronic kidney disease, with heart failure    2. Heart replaced by heart assist device    3. Heart failure    4. CKD (chronic kidney disease), stage III        Plan:   1. Hypokalemia will increase aldactone to 50 with hope of getting better doppler pressure     2. Will check BMP weekly to see if decrease supplemental k Will give extra 20 meq for KCL today and tomorrow for K of 3.3   3.  Pt reports that doppler pressure is always over 80 as he has a history of HTN   Would like to control BP before we increase speed any further  4.  Nurse visit in two weeks for doppler BP check   Patient is now NYHA II  Recommend 2 gram sodium restriction and 1500cc fluid restriction.  Encourage physical activity with graded exercise program.  Requested patient to weigh themselves daily, and to notify us if their weight increases by more than 3 lbs in 1 day or 5 lbs in 1 week.     Listed for transplant: No needs to be smoke free for six months starting in March 2018 Will check at next visit in two week

## 2018-08-09 NOTE — PROGRESS NOTES
"Date of Implant with Heartmate II LVAD: 3/8/18    PATIENT ARRIVED IN CLINIC:  Ambulatory  Accompanied by:Self    Vitals  Doppler:86  Pulsatile:no   PAIN:0 on 0-10 pain scale,  location of pain: na,   description of pain:na  Is patient currently on medications for pain?no What kind? na    VAD Interrogation:  TXP [facility] INTERROGATIONS 8/9/2018   Type HeartMate II   Flow 6.3   Speed 9400   PI 3.5   Power (Jackson) 6.4   LSL 9000   Pulsatility No Pulse       Flow in history: 6.0-7.5  Waveforms:[id number]    Problems / Issues / Alarms with VAD if any:one no external power alarm, patient states wife accidentally unplugged power module and immediately plugged it back in    Any Equipment Issues? (Refer to  note for details):none  Emergency Equipment With Patient:yes   VAD Binder With Patient: yes   Reviewed VAD Numbers In Binder:no  VAD Sounds: SMOOTH   Manual & Visual Inspection Of Driveline:  NO KINKS OR TEARS NOTED     LVAD Dressing/DLES:  Appearance Of Driveline:"1"  Antibiotics:NO  Frequency of Dressing Changes:twice per week with soap and water   Stabilization Device In Use: YES [place]    Pt In Need Of Management Kits?:yes, 1 box of soap and water  It is medically necessary to have VAD management kits in order to prevent infection or to assist in the healing of an infected DLES.    Assessment:   Complaints/reason for visit today: Routine follow up    Complaints Of Nausea / Vomiting:none   Appearance and Frequency Of Stools:normal and formed without blood every other day    Patient reports clear, yellow urine:YES   Coping/Depression/Anxiety:patient coping ok  Sleep Habits:7 hrs /night  Sleep Aids:na  Showering :YES , Patient reminded to change dressing immediately after showering and drying off.    Activity/Exercise:yes 1 hour 6 days per week    Driving:yes, Reminded to pull over should there be an alarm before looking down at controller.     Labs:    Chemistry        Component Value Date/Time "     (L) 08/09/2018 1255    K 3.3 (L) 08/09/2018 1255     (L) 08/09/2018 1255    CO2 26 08/09/2018 1255    BUN 16 08/09/2018 1255    CREATININE 1.8 (H) 08/09/2018 1255     (H) 08/09/2018 1255        Component Value Date/Time    CALCIUM 8.9 08/09/2018 1255    ALKPHOS 85 08/09/2018 1255    AST 28 08/09/2018 1255    ALT 21 08/09/2018 1255    BILITOT 0.8 08/09/2018 1255    ESTGFRAFRICA 49.3 (A) 08/09/2018 1255    EGFRNONAA 42.6 (A) 08/09/2018 1255            Magnesium   Date Value Ref Range Status   08/09/2018 2.2 1.6 - 2.6 mg/dL Final       Lab Results   Component Value Date    WBC 4.20 08/09/2018    HGB 11.5 (L) 08/09/2018    HCT 36.9 (L) 08/09/2018    MCV 78 (L) 08/09/2018     08/09/2018       Lab Results   Component Value Date    INR 2.2 (H) 08/09/2018    INR 1.8 (H) 07/26/2018    INR 2.1 (H) 07/18/2018       BNP   Date Value Ref Range Status   08/09/2018 409 (H) 0 - 99 pg/mL Final     Comment:     Values of less than 100 pg/ml are consistent with non-CHF populations.   07/26/2018 411 (H) 0 - 99 pg/mL Final     Comment:     Values of less than 100 pg/ml are consistent with non-CHF populations.   07/18/2018 1,009 (H) 0 - 99 pg/mL Final     Comment:     Values of less than 100 pg/ml are consistent with non-CHF populations.       LD   Date Value Ref Range Status   08/09/2018 204 (H) 84 - 195 U/L Final     Comment:     Results are increased in hemolyzed samples.   07/26/2018 202 (H) 84 - 195 U/L Final     Comment:     Results are increased in hemolyzed samples.   07/18/2018 271 (H) 110 - 260 U/L Final     Comment:     Results are increased in hemolyzed samples.       Labs reviewed with patient: YES      Patient Satisfaction Survey Completed by Patient: no  (explained about signature and box to check)  Medication reconciliation: per MA.  Purple card updated today:YES   Coumadin Managed by: [facility],     Education: Reviewed driveline care, emergency procedures, alarms with patient.   Reminded patient never to change the controller without paging a VAD coordinator first.  Also reviewed how to get in touch with a VAD coordinator in the event of an emergency.       Plans/Needs:Patient presents today for routine followup. Patient states he is doing well. Doppler 86. Patient confirms that he is taking cardura 2 mg BID and potassium 20 mEq BID as instructed at last office visit. Potassium 3.3 today. Per Dr. Connors, patient to increase aldactone to 50 mg with BMP weekly (x 2 weeks) and come for BP check and labs in two weeks with  Visit in one month. No other changes today. Refer to MD note.

## 2018-08-16 ENCOUNTER — PATIENT MESSAGE (OUTPATIENT)
Dept: TRANSPLANT | Facility: CLINIC | Age: 53
End: 2018-08-16

## 2018-08-16 ENCOUNTER — DOCUMENTATION ONLY (OUTPATIENT)
Dept: TRANSPLANT | Facility: CLINIC | Age: 53
End: 2018-08-16

## 2018-08-16 ENCOUNTER — TELEPHONE (OUTPATIENT)
Dept: TRANSPLANT | Facility: CLINIC | Age: 53
End: 2018-08-16

## 2018-08-16 ENCOUNTER — ANTI-COAG VISIT (OUTPATIENT)
Dept: CARDIOLOGY | Facility: CLINIC | Age: 53
End: 2018-08-16

## 2018-08-16 DIAGNOSIS — Z79.01 LONG TERM (CURRENT) USE OF ANTICOAGULANTS: ICD-10-CM

## 2018-08-16 DIAGNOSIS — Z95.811 LVAD (LEFT VENTRICULAR ASSIST DEVICE) PRESENT: ICD-10-CM

## 2018-08-16 NOTE — TELEPHONE ENCOUNTER
Called pt to review labs with him.  No answer, left a message to call me back.    1650:  Pt called back, results reviewed with him. Also reviewed result note from Dr. Connors with him.   He asked if he could take vitamin D, I said yes.

## 2018-08-16 NOTE — PROGRESS NOTES
Received inbasket message from pt asking about timing of getting on the OHT list.   Pt has an appt with LVAD for 8/23/18.  Added an appt for nurse visit with preht coordinator to discuss.     Pt will need repeat RHC showing improvement in PA pressures, negative nicotine screen and updated labs/CTs.   Will review all items with pt at clinic visit.

## 2018-08-16 NOTE — PROGRESS NOTES
Questioned and confirmed dose.  Reports having Chinese food ( egg rolls , Broccoli w/ beer, and cabbage intake ) .

## 2018-08-23 ENCOUNTER — CLINICAL SUPPORT (OUTPATIENT)
Dept: TRANSPLANT | Facility: CLINIC | Age: 53
End: 2018-08-23
Payer: COMMERCIAL

## 2018-08-23 ENCOUNTER — DOCUMENTATION ONLY (OUTPATIENT)
Dept: TRANSPLANT | Facility: CLINIC | Age: 53
End: 2018-08-23

## 2018-08-23 ENCOUNTER — ANTI-COAG VISIT (OUTPATIENT)
Dept: CARDIOLOGY | Facility: CLINIC | Age: 53
End: 2018-08-23

## 2018-08-23 VITALS — SYSTOLIC BLOOD PRESSURE: 86 MMHG

## 2018-08-23 DIAGNOSIS — Z79.01 LONG TERM (CURRENT) USE OF ANTICOAGULANTS: ICD-10-CM

## 2018-08-23 DIAGNOSIS — Z01.818 PRE-TRANSPLANT EVALUATION FOR HEART TRANSPLANT: Primary | ICD-10-CM

## 2018-08-23 DIAGNOSIS — Z95.811 LVAD (LEFT VENTRICULAR ASSIST DEVICE) PRESENT: ICD-10-CM

## 2018-08-23 PROCEDURE — 99999 PR PBB SHADOW E&M-EST. PATIENT-LVL I: CPT | Mod: PBBFAC,,,

## 2018-08-23 RX ORDER — DOXAZOSIN 4 MG/1
TABLET ORAL
Qty: 60 TABLET | Refills: 11 | Status: SHIPPED | OUTPATIENT
Start: 2018-08-23 | End: 2018-09-27 | Stop reason: SDUPTHER

## 2018-08-23 NOTE — PROGRESS NOTES
Met with pt in clinic to discuss the possibility of re-opening his OHT eval.   Discussed with Dr Rhodes as well.   Pt was previously declined for OHT due to elevated PA pressures and recent smoking.     Pt reports he has not smoked since January, and he currently consumes no alcohol/recreational drugs.     Due to concerns with inconsistent compliance, Dr Rhodes requests that pt have RHC to check PA pressures and update with SW prior to initiating evaluation.  Pt informed that RHC and SW appt will be added to his next VAD clinic date.

## 2018-08-23 NOTE — LETTER
September 17, 2018    Tim Somers Maurice  [address]  [place] [address]             [facility]  [address] [place] [address]  Phone: [phone]  Fax: [phone] Dear Mr Orestes Richards:    Our records indicate an appointment was missed to have a PT/INR checked.  We have made multiple attempts to contact you by phone and were unsuccessful.  It is extremely important that our clinic is contacted immediately in order to reschedule the missed appointment, as it is important that you are monitored regularly while taking Coumadin (warfarin).  If monitoring by our clinic is no longer required, please call and inform our clinic so that we may update our records and discontinue further attempts to reach you.          Contact information for the Coumadin Clinic:    Phone: [phone]    Fax: [phone]      Thank You,    [facility] Staff

## 2018-08-23 NOTE — PROGRESS NOTES
Patient seen in clinic for blood pressure check with Robyn Phillips RN VAD coordinator.     Patient had blood pressure of 86 doppler, nonpulsatile.     Labs reviewed.    We will increase cardura to 2mg in AM and 4mg in PM.    Lab Results   Component Value Date     (H) 08/23/2018     08/23/2018    K 3.9 08/23/2018    MG 2.3 08/23/2018    CL 99 (L) 08/23/2018    CO2 28 08/23/2018    BUN 20 08/23/2018    CREATININE 1.7 (H) 08/23/2018    GLU 98 08/23/2018    HGBA1C 5.5 03/08/2018    AST 29 08/23/2018    ALT 20 08/23/2018    ALBUMIN 4.6 08/23/2018    PROT 8.3 08/23/2018    BILITOT 0.6 08/23/2018    CHOL 147 04/19/2018    HDL 73 04/19/2018    LDLCALC 60.8 (L) 04/19/2018    TRIG 66 04/19/2018       Magnesium   Date Value Ref Range Status   08/23/2018 2.3 1.6 - 2.6 mg/dL Final       Lab Results   Component Value Date    WBC 4.00 08/23/2018    HGB 12.2 (L) 08/23/2018    HCT 38.8 (L) 08/23/2018    MCV 79 (L) 08/23/2018     08/23/2018       Lab Results   Component Value Date    INR 2.1 (H) 08/23/2018    INR 1.8 (H) 08/16/2018    INR 2.2 (H) 08/09/2018       BNP   Date Value Ref Range Status   08/23/2018 409 (H) 0 - 99 pg/mL Final     Comment:     Values of less than 100 pg/ml are consistent with non-CHF populations.   08/09/2018 409 (H) 0 - 99 pg/mL Final     Comment:     Values of less than 100 pg/ml are consistent with non-CHF populations.   07/26/2018 411 (H) 0 - 99 pg/mL Final     Comment:     Values of less than 100 pg/ml are consistent with non-CHF populations.       LD   Date Value Ref Range Status   08/23/2018 219 (H) 84 - 195 U/L Final     Comment:     Results are increased in hemolyzed samples.   08/09/2018 204 (H) 84 - 195 U/L Final     Comment:     Results are increased in hemolyzed samples.   07/26/2018 202 (H) 84 - 195 U/L Final     Comment:     Results are increased in hemolyzed samples.

## 2018-09-15 DIAGNOSIS — E79.0 HYPERURICEMIA: ICD-10-CM

## 2018-09-15 RX ORDER — ALLOPURINOL 100 MG/1
100 TABLET ORAL DAILY
Qty: 30 TABLET | Refills: 2 | Status: CANCELLED | OUTPATIENT
Start: 2018-09-15

## 2018-09-19 DIAGNOSIS — E79.0 HYPERURICEMIA: ICD-10-CM

## 2018-09-19 RX ORDER — ALLOPURINOL 100 MG/1
100 TABLET ORAL DAILY
Qty: 30 TABLET | Refills: 5 | Status: SHIPPED | OUTPATIENT
Start: 2018-09-19 | End: 2019-03-20 | Stop reason: SDUPTHER

## 2018-09-19 NOTE — TELEPHONE ENCOUNTER
----- Message from Rekha Hoang sent at 9/19/2018 11:48 AM CDT -----  Contact: [facility]  Pharmacist doing a f/u on the refill for the pt Allopurinol 100 mg    Thanks

## 2018-09-27 ENCOUNTER — RESEARCH ENCOUNTER (OUTPATIENT)
Dept: RESEARCH | Facility: HOSPITAL | Age: 53
End: 2018-09-27

## 2018-09-27 ENCOUNTER — CLINICAL SUPPORT (OUTPATIENT)
Dept: TRANSPLANT | Facility: CLINIC | Age: 53
End: 2018-09-27
Payer: COMMERCIAL

## 2018-09-27 ENCOUNTER — OFFICE VISIT (OUTPATIENT)
Dept: TRANSPLANT | Facility: CLINIC | Age: 53
End: 2018-09-27
Attending: INTERNAL MEDICINE
Payer: COMMERCIAL

## 2018-09-27 ENCOUNTER — ANTI-COAG VISIT (OUTPATIENT)
Dept: CARDIOLOGY | Facility: CLINIC | Age: 53
End: 2018-09-27

## 2018-09-27 ENCOUNTER — HOSPITAL ENCOUNTER (OUTPATIENT)
Facility: HOSPITAL | Age: 53
Discharge: HOME | End: 2018-09-27
Attending: INTERNAL MEDICINE | Admitting: INTERNAL MEDICINE
Payer: COMMERCIAL

## 2018-09-27 ENCOUNTER — SOCIAL WORK (OUTPATIENT)
Dept: TRANSPLANT | Facility: CLINIC | Age: 53
End: 2018-09-27
Payer: COMMERCIAL

## 2018-09-27 VITALS — WEIGHT: 236 LBS | TEMPERATURE: 98 F | SYSTOLIC BLOOD PRESSURE: 89 MMHG | HEIGHT: 73 IN | BODY MASS INDEX: 31.28 KG/M2

## 2018-09-27 DIAGNOSIS — I47.29 PVT (PAROXYSMAL VENTRICULAR TACHYCARDIA): ICD-10-CM

## 2018-09-27 DIAGNOSIS — I42.0 DILATED CARDIOMYOPATHY: ICD-10-CM

## 2018-09-27 DIAGNOSIS — Z01.818 PRE-TRANSPLANT EVALUATION FOR HEART TRANSPLANT: ICD-10-CM

## 2018-09-27 DIAGNOSIS — N18.30 CKD (CHRONIC KIDNEY DISEASE), STAGE III: ICD-10-CM

## 2018-09-27 DIAGNOSIS — Z00.6 EXAMINATION OF PARTICIPANT IN CLINICAL TRIAL: ICD-10-CM

## 2018-09-27 DIAGNOSIS — Z95.810 BIVENTRICULAR IMPLANTABLE CARDIOVERTER-DEFIBRILLATOR IN SITU: ICD-10-CM

## 2018-09-27 DIAGNOSIS — Z79.01 LONG TERM (CURRENT) USE OF ANTICOAGULANTS: ICD-10-CM

## 2018-09-27 DIAGNOSIS — I50.22 CHRONIC SYSTOLIC CONGESTIVE HEART FAILURE: ICD-10-CM

## 2018-09-27 DIAGNOSIS — I13.0 HYPERTENSIVE CARDIOVASCULAR-RENAL DISEASE, STAGE 1-4 OR UNSPECIFIED CHRONIC KIDNEY DISEASE, WITH HEART FAILURE: ICD-10-CM

## 2018-09-27 DIAGNOSIS — Z95.811 LVAD (LEFT VENTRICULAR ASSIST DEVICE) PRESENT: Primary | ICD-10-CM

## 2018-09-27 DIAGNOSIS — Z79.899 HIGH RISK MEDICATIONS (NOT ANTICOAGULANTS) LONG-TERM USE: ICD-10-CM

## 2018-09-27 DIAGNOSIS — Z95.811 LVAD (LEFT VENTRICULAR ASSIST DEVICE) PRESENT: ICD-10-CM

## 2018-09-27 DIAGNOSIS — I50.42 CHRONIC COMBINED SYSTOLIC AND DIASTOLIC HEART FAILURE: ICD-10-CM

## 2018-09-27 PROCEDURE — 99214 OFFICE O/P EST MOD 30 MIN: CPT | Mod: 25,S$GLB,TXP, | Performed by: INTERNAL MEDICINE

## 2018-09-27 PROCEDURE — 99999 PR PBB SHADOW E&M-EST. PATIENT-LVL III: CPT | Mod: PBBFAC,TXP,, | Performed by: INTERNAL MEDICINE

## 2018-09-27 PROCEDURE — 93750 INTERROGATION VAD IN PERSON: CPT | Mod: S$GLB,TXP,, | Performed by: INTERNAL MEDICINE

## 2018-09-27 PROCEDURE — 25000003 PHARM REV CODE 250: Mod: TXP

## 2018-09-27 PROCEDURE — C1894 INTRO/SHEATH, NON-LASER: HCPCS

## 2018-09-27 PROCEDURE — 93451 RIGHT HEART CATH: CPT | Mod: 26,TXP,, | Performed by: INTERNAL MEDICINE

## 2018-09-27 RX ORDER — DOXAZOSIN 4 MG/1
4 TABLET ORAL EVERY 12 HOURS
Qty: 60 TABLET | Refills: 1 | Status: SHIPPED | OUTPATIENT
Start: 2018-09-27 | End: 2018-09-28

## 2018-09-27 NOTE — PROGRESS NOTES
Study: Prevent II  Sponsor: [employer]  Follow-up Visit: 6 month  Date of Visit: [id number]    Patient wishes to continue in study: Yes  All study protocol required CRFs completed: Yes    Mr. Richards is here for the 6 month follow up visit. Current list of medications obtained and verified; he reports that he has been admitted to the hospital once since previous study follow-up. All questions answered to his satisfaction and he will contact research staff if he has any questions or concerns. Next follow up visit is in 3 months. Patient returned bottle #[id number] and was dispensed a new bottle #[id number].     The following tests and procedures are related to research study:  Labs, VAD clinic visit

## 2018-09-27 NOTE — DISCHARGE INSTRUCTIONS
RESUME coumadin (dosing per coumadin clinic)    AFTER THE PROCEDURE:  -You may remove the bandage in 24 hours and wash with soap and water.  -You may shower, but do not soak in a tub for three days.   PRECAUTIONS FOR THE NEXT 24 HOURS:  -If you need to cough, sneeze, have a bowel movement, or bear down, hold pressure over your bandage.  -Do not  anything heavier than a gallon of milk(about 5 pounds)  -Avoid excessive bending over.  SYMPTOMS TO WATCH FOR AND REPORT TO YOUR DOCTOR:  -BLEEDING: hold pressure over the site until bleeding stops. Proceed to Emergency Room by ambulance (do not drive yourself) if unable to stop bleeding. Notify your doctor.  -HEMATOMA(hard bruise under the skin): [place] around the bruise if one develops. Call your doctor if it increases in size or if you have difficulty talking, swallowing, breathing or anything unusual.  SIGNS OF INFECTION:Fever (temperature over 100.5 F), pus or redness  -RASH  -CHEST PAIN OR SHORTNESS OF BREATH    You may call you coordinator in the Heart Failure/Heart Transplant/Pulmonary Hypertension Clinic at [phone] during normal business hours(Monday through Friday from 8 A.M. to 5 P.M.) After hours, call the Heart Transplant Service doctor on call at [phone].

## 2018-09-27 NOTE — PROCEDURES
TXP [facility] INTERROGATIONS 9/27/2018 8/9/2018 7/13/2018 6/16/2018 6/6/2018 5/17/2018 5/3/2018   Type HeartMate II HeartMate II HeartMate II - HeartMate II HeartMate II HeartMate II   Flow 6.9 6.3 6.9 - 7.0 6.1 6.3   Speed 9400 9400 9200 - 9200 9200 9000   PI 4.0 3.5 4.4 - 3.7 5.2 5.6   Power (Jackson) 6.7 6.4 6.5 - 6.6 6.3 5.6   LSL 9000 9000 8800 - 8800 8800 8600   Pulsatility No Pulse No Pulse Pulse Intermittent pulse Pulse No Pulse Intermittent pulse   }Interrogation of Ventricular assist device was performed with physician analysis of device parameters and review of device function. I have personally reviewed the interrogation findings and agree with findings as stated.

## 2018-09-27 NOTE — LETTER
October 24, 2018    Tim Somers Richards  [address]  [place] [address]             [facility]  [address] [place] [address]  Phone: [phone]  Fax: [phone] Dear Mr. Richards:    Our records indicate an appointment was missed to have a PT/INR checked.  We have made multiple attempts to contact you by phone and were unsuccessful.  It is extremely important that our clinic is contacted immediately in order to reschedule the missed appointment, as it is important that you are monitored regularly while taking Coumadin (warfarin).  If monitoring by our clinic is no longer required, please call and inform our clinic so that we may update our records and discontinue further attempts to reach you.      Please call the Coumadin Clinic immediately upon receiving this letter, failure to do so may result in discharge from the clinic.  No response to this letter within 7 days will result in discharge from our clinic.      Contact information for the Coumadin Clinic:    Phone: [phone]    Fax: [phone]      Thank You,    [facility] Staff

## 2018-09-27 NOTE — PROGRESS NOTES
Date of Implant with Heartmate II LVAD: 3/8/18    PATIENT ARRIVED IN CLINIC:  Ambulatory   Accompanied by: wife    Vitals  Doppler: 89- did not take mid-day meds  PAIN: 0 on 0-10 pain scale, location of pain: n/a, description of pain: n/a  Is patient currently on medications for pain? no What kind?     VAD Interrogation:  TXP [facility] INTERROGATIONS 9/27/2018   Type HeartMate II   Flow 6.9   Speed 9400   PI 4.0   Power (Jackson) 6.7   LSL 9000   Pulsatility No Pulse       Flow in history: 6-7s  Waveforms: [id number]  History Log:     Problems / Issues / Alarms with VAD if any: None noted  Any Equipment Issues: None noted (Refer to  note for complete details)   Emergency Equipment With Patient: Yes    VAD Binder With Patient: no   Reviewed VAD Numbers In Binder: no  VAD Sounds: Smooth  Manual & Visual Inspection Of Driveline: No kinks or tears noted     LVAD Dressing/DLES:  Appearance Of Driveline: 1  Antibiotics: NO  Frequency of Dressing Changes: every other day & soap and water dressing  Stabilization Device In Use: yes, sun securement device     Pt In Need Of Management Kits? No  It is medically necessary to have VAD management kits in order to prevent infection or to assist in the healing of an infected DLES.    Assessment:   Complaints/reason for visit today:routine  Complaints Of Nausea / Vomiting: None noted  Appearance and Frequency Of Stools: normal and formed without blood & every other day  Color Of Urine: clear/yellow  Coping/Depression/Anxiety: coping okay  Sleep Habits: 7 hrs /night  Sleep Aids: None noted  Showering: Yes, reminded to change dressing immediately after drying off     Activity/Exercise: working daily   Driving: Yes. Reminded to pull over should there be an alarm before looking down at controller.    Labs:    Chemistry        Component Value Date/Time     09/27/2018 1225    K 3.8 09/27/2018 1225    CL 99 09/27/2018 1225    CO2 28 09/27/2018 1225    BUN 27 (H)  09/27/2018 1225    CREATININE 2.1 (H) 09/27/2018 1225    GLU 76 09/27/2018 1225        Component Value Date/Time    CALCIUM 10.4 09/27/2018 1225    ALKPHOS 112 09/27/2018 1225    AST 25 09/27/2018 1225    ALT 22 09/27/2018 1225    BILITOT 0.7 09/27/2018 1225    ESTGFRAFRICA 40.9 (A) 09/27/2018 1225    EGFRNONAA 35.4 (A) 09/27/2018 1225            Magnesium   Date Value Ref Range Status   09/27/2018 2.6 1.6 - 2.6 mg/dL Final       Lab Results   Component Value Date    WBC 5.35 09/27/2018    HGB 12.3 (L) 09/27/2018    HCT 41.4 09/27/2018    MCV 84 09/27/2018     09/27/2018       Lab Results   Component Value Date    INR 1.9 (H) 09/27/2018    INR 2.1 (H) 08/23/2018    INR 1.8 (H) 08/16/2018       BNP   Date Value Ref Range Status   09/27/2018 424 (H) 0 - 99 pg/mL Final     Comment:     Values of less than 100 pg/ml are consistent with non-CHF populations.   08/23/2018 409 (H) 0 - 99 pg/mL Final     Comment:     Values of less than 100 pg/ml are consistent with non-CHF populations.   08/09/2018 409 (H) 0 - 99 pg/mL Final     Comment:     Values of less than 100 pg/ml are consistent with non-CHF populations.       LD   Date Value Ref Range Status   09/27/2018 275 (H) 110 - 260 U/L Final     Comment:     Results are increased in hemolyzed samples.   08/23/2018 219 (H) 84 - 195 U/L Final     Comment:     Results are increased in hemolyzed samples.   08/09/2018 204 (H) 84 - 195 U/L Final     Comment:     Results are increased in hemolyzed samples.       Labs reviewed with patient: YES Creatinine up to 2.1     Patient Satisfaction Survey Completed by Patient: no  (explained about signature and box to check)  Medication reconciliation: per MA.  Purple card updated today: yes  Coumadin Managed by: [facility]    Education: Reviewed driveline care, emergency procedures, how to change the controller, alarms with patient, as well as discussed how to page the VAD coordinator in case of an emergency.       Plans/Needs: Routine f/u. Cr 2.1-[facility] today. Doppler 89; pt has not taken his morning aldactone or mid-day hydralazine- pt took while in clinic. Increase Cardura to 4mg BID. Will also get 2DE today to assess if speed can be increased. Refer to MD note.    Addendum: Pt refused echo today. Pt will return next Friday for echo, b/p check and possible increase in speed. Refer to MD note.     Hurricane Season: No

## 2018-09-27 NOTE — DISCHARGE SUMMARY
Admit 09/27/2018  Discharge  09/27/2018  Discharge / Principle diagnosis CHF  Discharge attending Bettye Connors MD  Hospital course.  Came in for RHC . Tolerated procedure well (see full results in cardiac procedure section).  Results discussed with patient / caregiver.   Discharge condition / disposition Good / home   discharge activity activity as tolerated    Discharge diet diet as tolerated / unchanged from admission  Discharge medication   No current facility-administered medications on file prior to encounter.      Current Outpatient Medications on File Prior to Encounter   Medication Sig    amiodarone (PACERONE) 200 MG Tab TAKE 1 TABLET (200 MG TOTAL) BY MOUTH ONCE DAILY.    amLODIPine (NORVASC) 10 MG tablet TAKE 1 TABLET (10 MG TOTAL) BY MOUTH ONCE DAILY.    bumetanide (BUMEX) 2 MG tablet Take 1 tablet (2 mg total) by mouth once daily.    doxazosin (CARDURA) 4 MG tablet 2mg in AM and 4mg at night    ferrous gluconate (FERGON) 324 MG tablet Take 1 tablet (324 mg total) by mouth daily with breakfast.    hydrALAZINE (APRESOLINE) 100 MG tablet Take 1 tablet (100 mg total) by mouth every 8 (eight) hours.    magnesium oxide (MAG-OX) 400 mg tablet Take 1 tablet (400 mg total) by mouth 3 (three) times daily.    pantoprazole (PROTONIX) 40 MG tablet Take 1 tablet (40 mg total) by mouth once daily.    potassium chloride SA (K-DUR,KLOR-CON) 20 MEQ tablet Take 1 tablet (20 mEq total) by mouth 2 (two) times daily. (Patient taking differently: Take 20 mEq by mouth once daily. )    spironolactone (ALDACTONE) 50 MG tablet Take 1 tablet (50 mg total) by mouth once daily.    warfarin (COUMADIN) 5 MG tablet Take 1 tablets (5mg) by mouth on Tues, Thurs, Sat. Take 1.5 tablets (7.5mg) all other days.    [DISCONTINUED] INV aspirin/placebo tablet Take 1 tablet (81 mg total) by mouth once daily.  For Investigational Use Only. Protocol # Prevent II     Followup in clinic as scheduled

## 2018-09-27 NOTE — LETTER
September 27, 2018        Nader Chiu  [address]  [address]  [facility], [place] [address]  Phone: [phone]  Fax: [phone]             [facility] [address] [place] [address]  Phone: [phone]   Patient: Tim Richards   MR Number: [id number]   YOB: 1965   Date of Visit: 9/27/2018       Dear Dr. Nader Chiu    Thank you for referring Tim Richards to me for evaluation. Attached you will find relevant portions of my assessment and plan of care.    If you have questions, please do not hesitate to call me. I look forward to following Tim Richards along with you.    Sincerely,    Antonio Hadley Jr, MD    Enclosure    If you would like to receive this communication electronically, please contact [email or web address] or [phone] to request [employer] access.    [employer] is a tool which provides read-only access to select patient information with whom you have a relationship. Its easy to use and provides real time access to review your patients record including encounter summaries, notes, results, and demographic information.    If you feel you have received this communication in error or would no longer like to receive these types of communications, please e-mail [email or web address]

## 2018-09-27 NOTE — PROGRESS NOTES
Date of Implant with Heartmate II LVAD: 3/8/18    PATIENT ARRIVED IN CLINIC:  Ambulatory   Accompanied by: wife    Vitals  Doppler: 89- did not take mid-day meds  PAIN: 0 on 0-10 pain scale, location of pain: n/a, description of pain: n/a  Is patient currently on medications for pain? no What kind?     VAD Interrogation:  TXP [facility] INTERROGATIONS 9/27/2018   Type HeartMate II   Flow 6.9   Speed 9400   PI 4.0   Power (Jackson) 6.7   LSL 9000   Pulsatility No Pulse       Flow in history: 6-7s  Waveforms: [id number]  History Log:     Problems / Issues / Alarms with VAD if any: None noted  Any Equipment Issues: None noted (Refer to  note for complete details)   Emergency Equipment With Patient: Yes    VAD Binder With Patient: no   Reviewed VAD Numbers In Binder: no  VAD Sounds: Smooth  Manual & Visual Inspection Of Driveline: No kinks or tears noted     LVAD Dressing/DLES:  Appearance Of Driveline: 1  Antibiotics: NO  Frequency of Dressing Changes: every other day & soap and water dressing  Stabilization Device In Use: yes, sun securement device     Pt In Need Of Management Kits? No  It is medically necessary to have VAD management kits in order to prevent infection or to assist in the healing of an infected DLES.    Assessment:   Complaints/reason for visit today:routine  Complaints Of Nausea / Vomiting: None noted  Appearance and Frequency Of Stools: normal and formed without blood & every other day  Color Of Urine: clear/yellow  Coping/Depression/Anxiety: coping okay  Sleep Habits: 7 hrs /night  Sleep Aids: None noted  Showering: Yes, reminded to change dressing immediately after drying off     Activity/Exercise: working daily   Driving: Yes. Reminded to pull over should there be an alarm before looking down at controller.    Labs:    Chemistry        Component Value Date/Time     09/27/2018 1225    K 3.8 09/27/2018 1225    CL 99 09/27/2018 1225    CO2 28 09/27/2018 1225    BUN 27 (H)  09/27/2018 1225    CREATININE 2.1 (H) 09/27/2018 1225    GLU 76 09/27/2018 1225        Component Value Date/Time    CALCIUM 10.4 09/27/2018 1225    ALKPHOS 112 09/27/2018 1225    AST 25 09/27/2018 1225    ALT 22 09/27/2018 1225    BILITOT 0.7 09/27/2018 1225    ESTGFRAFRICA 40.9 (A) 09/27/2018 1225    EGFRNONAA 35.4 (A) 09/27/2018 1225            Magnesium   Date Value Ref Range Status   09/27/2018 2.6 1.6 - 2.6 mg/dL Final       Lab Results   Component Value Date    WBC 5.35 09/27/2018    HGB 12.3 (L) 09/27/2018    HCT 41.4 09/27/2018    MCV 84 09/27/2018     09/27/2018       Lab Results   Component Value Date    INR 1.9 (H) 09/27/2018    INR 2.1 (H) 08/23/2018    INR 1.8 (H) 08/16/2018       BNP   Date Value Ref Range Status   09/27/2018 424 (H) 0 - 99 pg/mL Final     Comment:     Values of less than 100 pg/ml are consistent with non-CHF populations.   08/23/2018 409 (H) 0 - 99 pg/mL Final     Comment:     Values of less than 100 pg/ml are consistent with non-CHF populations.   08/09/2018 409 (H) 0 - 99 pg/mL Final     Comment:     Values of less than 100 pg/ml are consistent with non-CHF populations.       LD   Date Value Ref Range Status   09/27/2018 275 (H) 110 - 260 U/L Final     Comment:     Results are increased in hemolyzed samples.   08/23/2018 219 (H) 84 - 195 U/L Final     Comment:     Results are increased in hemolyzed samples.   08/09/2018 204 (H) 84 - 195 U/L Final     Comment:     Results are increased in hemolyzed samples.       Labs reviewed with patient: YES Creatinine up to 2.1     Patient Satisfaction Survey Completed by Patient: no  (explained about signature and box to check)  Medication reconciliation: per MA.  Purple card updated today: yes  Coumadin Managed by: [facility]    Education: Reviewed driveline care, emergency procedures, how to change the controller, alarms with patient, as well as discussed how to page the VAD coordinator in case of an emergency.       Plans/Needs: Routine f/u. RHC today. Doppler 89; pt has not taken his daily aldactone or mid-day hydralazine- pt took while in clinic.      Hurricane Season: No

## 2018-09-27 NOTE — PROGRESS NOTES
Subjective:   Patient ID:  Tim Richards is a 52 y.o. male who presents for LVAD followup visit.    Implant Date: 3/8/2018  Initials:RBB     Heartmate II RPM 9400  3/9/18 outflow graft changed     INR goal: 2-3   Bridge with Heparin   Antiplatelets: , prevent trial    TXP [facility] INTERROGATIONS 9/27/2018   Type HeartMate II   Flow 6.9   Speed 9400   PI 4.0   Power (Jackson) 6.7   LSL 9000   Pulsatility No Pulse   Interrogation of Ventricular assist device was performed with physician analysis of device parameters and review of device function. I have personally reviewed the interrogation findings and agree with findings as stated.     HPI  51 yo BM with DCM, HBP, CHF stage D s/p HM 2 comes for routine visit.  At visit in May 2018 LVAD speed increased from 9200 to 9400.  He did not have f/u echo.  At visit 8/9/18 aldactone increased to 50 mg qd for low K and BP control.  Speed not increased further with plan to control BP first.  He comes today for f/u visit.  He is active with mild COLEMAN. No orthopnea or PND.      Review of Systems   Constitution: Positive for malaise/fatigue (some fatigue) and weight loss (couple of pounds). Negative for chills, fever, weakness, night sweats and weight gain.   HENT: Negative for congestion, hearing loss, hoarse voice, nosebleeds and sore throat.    Eyes: Negative for double vision, pain, vision loss in left eye and vision loss in right eye.   Cardiovascular: Positive for dyspnea on exertion (mild). Negative for chest pain, claudication, irregular heartbeat, leg swelling, near-syncope, orthopnea, palpitations, paroxysmal nocturnal dyspnea and syncope.   Respiratory: Negative for cough, hemoptysis, shortness of breath, sleep disturbances due to breathing, snoring, sputum production and wheezing.    Endocrine: Negative for cold intolerance, heat intolerance, polydipsia and polyuria.   Hematologic/Lymphatic: Negative for bleeding problem. Does not bruise/bleed easily.  "  Musculoskeletal: Negative for falls, muscle cramps, myalgias and neck pain.   Gastrointestinal: Negative for bloating, abdominal pain, change in bowel habit, constipation, diarrhea, hematochezia, jaundice, melena and nausea.   Genitourinary: Negative for bladder incontinence, dysuria, frequency, hematuria, hesitancy, incomplete emptying and urgency.   Neurological: Negative for brief paralysis, dizziness, focal weakness, headaches, numbness, paresthesias and seizures.   Psychiatric/Behavioral: Negative for depression and memory loss. The patient is not nervous/anxious.      Objective:   Blood pressure (!) 89/0, temperature 97.9 °F (36.6 °C), temperature source Oral, height 6' 1" (1.854 m), weight 107 kg (236 lb).body mass index is 31.14 kg/m².  HE HAD NOT TAKEN MID-DAY HYDRALAZINE OR MORNING ALDACTONE SO TOOK BOTH IN CLINIC  Doppler: 89 mm Hg  Physical Exam   Constitutional: He appears well-developed and well-nourished. No distress.   BP (!) 89/0 (BP Location: Left arm, Patient Position: Sitting, BP Method: Medium (Manual)) Comment: doppler  Temp 97.9 °F (36.6 °C) (Oral)   Ht 6' 1" (1.854 m)   Wt 107 kg (236 lb)   BMI 31.14 kg/m²   BP doppler 89 upon arrival but had not yet taken mid day dose of hydralazine   HENT:   Head: Normocephalic and atraumatic.   Eyes: Conjunctivae are normal. No scleral icterus.   Neck: No JVD (large neck felt around 10 cm water ) present. No thyromegaly present.   Cardiovascular:   Normal VAD sounds; DL 1   Pulmonary/Chest: Effort normal and breath sounds normal. No respiratory distress. He has no wheezes. He has no rales.   Abdominal: Soft. Bowel sounds are normal. He exhibits no distension and no mass. There is no tenderness. There is no rebound and no guarding.   Musculoskeletal: He exhibits no edema or tenderness.   Neurological: He is alert.   Skin: Skin is warm and dry. He is not diaphoretic.   Psychiatric: He has a normal mood and affect. His behavior is normal. Judgment and " thought content normal.     Lab Results   Component Value Date     (H) 09/27/2018     09/27/2018    K 3.8 09/27/2018    MG 2.6 09/27/2018    CL 99 09/27/2018    CO2 28 09/27/2018    BUN 27 (H) 09/27/2018    CREATININE 2.1 (H) 09/27/2018    GLU 76 09/27/2018    HGBA1C 5.5 03/08/2018    AST 25 09/27/2018    ALT 22 09/27/2018    ALBUMIN 4.2 09/27/2018    PROT 8.6 (H) 09/27/2018    BILITOT 0.7 09/27/2018    WBC 5.35 09/27/2018    HGB 12.3 (L) 09/27/2018    HCT 41.4 09/27/2018    HCT 26 (L) 03/10/2018     09/27/2018    INR 1.9 (H) 09/27/2018     (H) 09/27/2018    TSH 4.477 (H) 04/19/2018    CHOL 147 04/19/2018    HDL 73 04/19/2018    LDLCALC 60.8 (L) 04/19/2018    TRIG 66 04/19/2018    I0RHXKE 8.0 04/19/2018    FREET4 1.38 04/19/2018     POST RHC TODAY PER REPORT DR. WELLS:  RA 10  PCWP 20  CI 2.2  Assessment:      1. LVAD (left ventricular assist device) present    2. Examination of participant in clinical trial    3. Chronic systolic heart failure    4. High risk medications (amiodarone) long-term use    5. PVT (paroxysmal ventricular tachycardia)    6. Biventricular implantable cardioverter-defibrillator in situ    7. Dilated cardiomyopathy    8. CKD (chronic kidney disease), stage III    9. Hypertensive cardiovascular-renal disease, stage 1-4 or unspecified chronic kidney disease, with heart failure      Plan:   Diuresis, wt loss, low sodium diet and fluid restriction reviewed along with daily weights and how to respond to 3# weight gain with prn diuretics.  If this fails to restore dry weight call us within 2-3 days.     Increase doxazosin from 4 mg hs to 4 mg BID--script in computer said he was on 2 mg AM and 4 mg PM but he reiterated only taking 4 mg hs    I want to increase speed but first need to confirm control of BP, obtain ECHO since not yet done after last speed change    Nurse check of BP next Friday   If BP mean > 80 mm Hg increase doxazosin to 8 mg BID   If BP mean < 80 mm Hg  and ECHO allows then increase speed to 9600 and have him walk around and observe for 30 min after speed change and then recheck BP; ECHO should be repeated 2 weeks later at which time he would be returning for LVAD check

## 2018-09-27 NOTE — H&P
Subjective:      Tim Richards is a 52 y.o. male with a who needs RHC for cardiac hemodynamics with LVAD at 9400 (no inotropes)   Currently able to lay flat.    Past Medical History:   Diagnosis Date    CHF (congestive heart failure)     Dilated cardiomyopathy 1/10/2018    Disorder of kidney and ureter     CKD    Gout     HTN (hypertension)     Ventricular tachycardia (paroxysmal)      Past Surgical History:   Procedure Laterality Date    CARDIAC CATHETERIZATION  Dec. 2012    CARDIAC DEFIBRILLATOR PLACEMENT Left     CRRT-D    CLOSURE-STERNAL WOUND N/A 3/10/2018    Performed by Yg Kaufman MD at [facility] OR 2ND FLR    COLONOSCOPY N/A 3/6/2018    Procedure: COLONOSCOPY;  Surgeon: Alonso Bone MD;  Location: [facility] ENDO (2ND FLR);  Service: Endoscopy;  Laterality: N/A;    COLONOSCOPY N/A 3/6/2018    Performed by Alonso Bone MD at [facility] ENDO (2ND FLR)    INSERTION-ICD-BIVENTRICULAR N/A 10/31/2014    [facility] LAB    Insertion-Left Ventricular Assist Device N/A 3/8/2018    Performed by Yg [facility]man MD at [facility] OR 2ND FLR    PLACEMENT-NEOPERICARDIUM  3/10/2018    Performed by Yg Kaufman MD at [facility] OR 2ND FLR    REVISION  3/9/2018    Performed by [facility]man MD at [facility] OR 2ND FLR    WASHOUT- MEDIASTINUM  3/9/2018    Performed by Yg [facility]aufman MD at [facility] OR 2ND FLR     Social History     Socioeconomic History    Marital status:      Spouse name: Not on file    Number of children: Not on file    Years of education: Not on file    Highest education level: Not on file   Social Needs    Financial resource strain: Not on file    Food insecurity - worry: Not on file    Food insecurity - inability: Not on file    Transportation needs - medical: Not on file    Transportation needs - non-medical: Not on file   Occupational History     Employer: [employer]    Tobacco Use    Smoking status: Former Smoker     Packs/day: 1.00     Years: 31.00      Pack years: 31.00     Types: Cigarettes     Last attempt to quit: 2018     Years since quittin.7    Smokeless tobacco: Never Used    Tobacco comment: pt is quiting on his own - pt stated not qualified for program;  pt  quit on his own   Substance and Sexual Activity    Alcohol use: No     Alcohol/week: 0.0 oz     Comment: one fifth of gin or rum/week    Drug use: No    Sexual activity: Yes     Partners: Female     Birth control/protection: None     Comment: 10/5/17  with same partner 7 years    Other Topics Concern    Not on file   Social History Narrative    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 Please call Yenny [phone] if patient is admitted to hospital    Lisinopril Anaphylaxis     No current facility-administered medications on file prior to encounter.      Current Outpatient Medications on File Prior to Encounter   Medication Sig    amiodarone (PACERONE) 200 MG Tab TAKE 1 TABLET (200 MG TOTAL) BY MOUTH ONCE DAILY.    amLODIPine (NORVASC) 10 MG tablet TAKE 1 TABLET (10 MG TOTAL) BY MOUTH ONCE DAILY.    bumetanide (BUMEX) 2 MG tablet Take 1 tablet (2 mg total) by mouth once daily.    doxazosin (CARDURA) 4 MG tablet 2mg in AM and 4mg at night    ferrous gluconate (FERGON) 324 MG tablet Take 1 tablet (324 mg total) by mouth daily with breakfast.    hydrALAZINE (APRESOLINE) 100 MG tablet Take 1 tablet (100 mg total) by mouth every 8 (eight) hours.    magnesium oxide (MAG-OX) 400 mg tablet Take 1 tablet (400 mg total) by mouth 3  (three) times daily.    pantoprazole (PROTONIX) 40 MG tablet Take 1 tablet (40 mg total) by mouth once daily.    potassium chloride SA (K-DUR,KLOR-CON) 20 MEQ tablet Take 1 tablet (20 mEq total) by mouth 2 (two) times daily. (Patient taking differently: Take 20 mEq by mouth once daily. )    spironolactone (ALDACTONE) 50 MG tablet Take 1 tablet (50 mg total) by mouth once daily.    warfarin (COUMADIN) 5 MG tablet Take 1 tablets (5mg) by mouth on Tues, Thurs, Sat. Take 1.5 tablets (7.5mg) all other days.    [DISCONTINUED] INV aspirin/placebo tablet Take 1 tablet (81 mg total) by mouth once daily.  For Investigational Use Only. Protocol # Prevent II            Objective:      Physical Exam  Doppler 89    General Appearance:    Alert, cooperative, no distress, appears stated age   Head:    Normocephalic, without obvious abnormality, atraumatic   Eyes:    PERRL, conjunctiva/corneas clear, EOM's intact, fundi     benign, both eyes        Ears:    Normal TM's and external ear canals, both ears   Nose:   Nares normal, septum midline, mucosa normal, no drainage    or sinus tenderness   Throat:   Lips, mucosa, and tongue normal; teeth and gums normal   Neck:   Supple, symmetrical, trachea midline, no adenopathy;        thyroid:  No enlargement/tenderness/nodules; no carotid    bruit or JVD   Back:     Symmetric, no curvature, ROM normal, no CVA tenderness   Lungs:     Clear to auscultation bilaterally, respirations unlabored   Chest wall:    No tenderness or deformity   Heart:    Regular rate and rhythm, S1 and S2 normal, no murmur, rub   or gallop   Abdomen:     Soft, non-tender, bowel sounds active all four quadrants,     no masses, no organomegaly   Genitalia:    Normal male without lesion, discharge or tenderness   Rectal:    Normal tone, normal prostate, no masses or tenderness;    guaiac negative stool   Extremities:   Extremities normal, atraumatic, no cyanosis or edema   Pulses:   2+ and symmetric all extremities    Skin:   Skin color, texture, turgor normal, no rashes or lesions   Lymph nodes:   Cervical, supraclavicular, and axillary nodes normal   Neurologic:   CNII-XII intact. Normal strength, sensation and reflexes       throughout         Lab Review   Lab Results   Component Value Date    WBC 5.35 09/27/2018    HGB 12.3 (L) 09/27/2018    HCT 41.4 09/27/2018    MCV 84 09/27/2018     09/27/2018     Lab Results   Component Value Date    INR 1.9 (H) 09/27/2018    INR 2.1 (H) 08/23/2018    INR 1.8 (H) 08/16/2018           Assessment:       Plan:     I have explained the risks, benefits, and alternatives of the procedure in detail.  The patient expresses understanding and all questions have been answered.  The patient agrees to the proceed as planned.  RHC  via RIJ   Micropuncture access needle will be used to minimize bleeding risk.

## 2018-09-28 NOTE — PROGRESS NOTES
Left Ventricular Assist Device (LVAD) and Transplant Recipient Adult Psychosocial Assessment Update    Pt and wife present for psychosocial update, SW requesting pt bring step son to next clinic appointment.     Encounter Date: 2018    Tim Richards  [address]  [place] [address]    Telephone Information:   Mobile [phone]   Work  There is no work phone number on file.  E-mail  [email or web address]    Sex: male  YOB: 1965  Age: 52 y.o.    [place] Citizen: yes  Primary Language: English   Needed: no    Emergency Contact:  Name: Kelly Richards  Relationship: wife  Address: same as pt  Phone Numbers: [phone]    Family/Social Support:   Number of dependents/: none reported  Marital history:  to Kelly since 2016; were together since . Each has 2 previous marriages.  Other family dynamics: Pt has 3 dgtrs: Zee 27 in [place], Wendy 24 in [place] and Cassy 19 in [place]. Wife has dgtr Jessica 29 and son Pasha 28. Pt has a brother in [place]. Pt's sister is  from cancer.    Household Composition: pt lives with wife, and 27 y/o step-son.    Do you and your caregivers have access to reliable transportation? yes     PRIMARY CAREGIVER: Kelly Richards, pt's wife, age 50 will be primary caregiver, phone number [phone].      provided in-depth information to Patient and Caregiver regarding  regarding pre- and post-LVAD and pre- and post-transplant caregiver role.   strongly encourages Patient and Caregiver to have concrete plan regarding post-transplant care giving, including back-up caregiver(s) to ensure care giving needs are met as needed.    Caregiver states understanding all aspects of caregiver role/commitment and is able/willing/committed to being caregiver to the fullest extent necessary.     Patient and Caregiver verbalizes understanding of the education provided today and caregiver responsibilities.   "     remains available. Patient and Caregiver agree to contact  in a timely manner if concerns arise.      Able to take time off work without financial concerns: yes, Pt's wife reports she can work remotely.     Additional Significant Others who will Assist with LVAD/Transplant:    Name: Pasha Pratt  Relationship: step son  Address: same as pt  Phone Numbers: [phone]  Age: 29  Does [address]: yes    Pt's wife reports her son Pasha can be back up caregiver. Pt's wife reports Pasha does work, and can take time off of work if necessary. SW requesting pt bring Pasha to next clinic visit for in person caregiver education.     Living Will: no  Healthcare Power of : no  Advance Directives on file: <<no information> per medical record.    Verbally reviewed LW/HCPA information.   provided patient with copy of LW/HCPA documents and provided education on completion of forms    Highest Education Level: Attended College/Technical School  Reading Ability: college  Reports difficulty with: seeing (wears glasses) and "small" memory issues. Pt reports memory issues relate to not paying attention and "selective hearing."  Learns Best By:  reading     Status: no  VA Benefits: no     Working for Income: yes  If yes, working activity level: Working Full Time-purchasing at [employer]  Spouse/Significant Other Employment: Full-time in payroll at [employer]    Pt returned to work shortly after having LVAD surgery, and reports desire to return to work as quickly as possible after transplant. Pt reports he does have Long and Short Term Disability through his employer, which will pay %65 of pt's salary. Pt's wife reports their house if paid off, and they will be able to live off of a decreased salary if necessary.      Disabled: no    Monthly Income:  Salary/Wages: $4200  Other household members total: $3200     Able to afford all costs now and if " transplanted or receives LVAD, including medications: yes  Pt reports secure power source? yes  Pt reports ability to afford monthly electric bill? yes  Pt reports ability to afford LVAD dressing supplies? yes     Patient and Caregiver verbalizes understanding of personal responsibilities related to transplant costs and the importance of having a financial plan to ensure that patients transplant costs are fully covered.       provided fundraising information/education.  Patient and Caregiver verbalizes understanding.   remains available.    Insurance:   Payor/Plan Subscr  Sex Relation Sub. Ins. ID Effective Group Num   1. AETNA - AETNA* JESUS PHELPS* 1965 Male  [id number]                                   [address]   2. [place] - [place]* JESUS PHELPS* 1965 Male  [id number] 12/1/15 [facility]                                   [address] [address]       Primary Insurance (for UNOS reporting): Private Insurance  Secondary Insurance (for UNOS reporting): None    Patient and Caregiver verbalizes clear understanding that patient may experience difficulty obtaining and/or be denied insurance coverage post-surgery. This includes and is not limited to disability insurance, life insurance, health insurance, burial insurance, long term care insurance, and other insurances.      Patient and Caregiver also reports understanding that future health concerns related to or unrelated to LVAD or transplantation may not be covered by patient's insurance.  Resources and information provided and reviewed.      Patient and Caregiver provides verbal permission to release any necessary information to outside resources for patient care and discharge planning.  Resources and information provided are reviewed.      Infusion Service: patient utilizing? no  Home Health: patient utilizing? no  DME: yes, sometimes uses a cane when he has a gout flare-not for over a  year.  Pulmonary/Cardiac Rehab: no  ADLS:  Pt reports independence and does drive.    Adherence:   Pt reports high level of adherence to pt's medical regimen. However, upon reviewing pt's chart, there were multiple instances of missed lab appointments documented. SW to address at next clinic visit.  Adherence education and counseling provided.     Per History Section:  Past Medical History:   Diagnosis Date    CHF (congestive heart failure)     Dilated cardiomyopathy 1/10/2018    Disorder of kidney and ureter     CKD    Gout     HTN (hypertension)     Ventricular tachycardia (paroxysmal)      Social History     Tobacco Use    Smoking status: Former Smoker     Packs/day: 1.00     Years: 31.00     Pack years: 31.00     Types: Cigarettes     Last attempt to quit: 2018     Years since quittin.7    Smokeless tobacco: Never Used    Tobacco comment: pt is quiting on his own - pt stated not qualified for program;  pt  quit on his own   Substance Use Topics    Alcohol use: No     Alcohol/week: 0.0 oz     Comment: one fifth of gin or rum/week     Social History     Substance and Sexual Activity   Drug Use No     Social History     Substance and Sexual Activity   Sexual Activity Yes    Partners: Female    Birth control/protection: None    Comment: 10/5/17  with same partner 7 years        Per Today's Psychosocial:  Tobacco: quit smoking 1/10/18; started smoking age 17; smoked 1/2 ppd but was down to 1-2 cigarettes/day prior to quiting.  Pt reports no current use and states does not like smell of smoke. Wife smokes but not around pt.  Alcohol: No current use reported, pt reports he last drank alcohol prior to LVAD surgery.  pt reports past social use of alcohol, approximately 3x/wk. Pt would sometimes have a drink with dinner and may have had 6 beers on weekends when watching a game.   Illicit Drugs/Non-prescribed Medications: none, patient denies any use.    Patient and Caregiver states clear  understanding of the potential impact of substance use as it relates to LVAD and transplant candidacy and is aware of possible random substance screening.  Substance abstinence/cessation counseling, education and resources provided and reviewed.     Arrests/DWI/Treatment/Rehab: patient denies    Psychiatric History:    Mental Health: pt denies  Psychiatrist/Counselor: pt denies  Medications:  OTC sleep aid in past; not currently using  Suicide/Homicide Issues: pt denies  Safety at home: no concerns reported at this time    Knowledge: Patient and Caregiver states having clear understanding and realistic expectations regarding the potential risks and potential benefits LVAD implantation and organ transplantation and organ donation and agrees to discuss with health care team members and support system members, as well as to utilize available resources and express questions and/or concerns in order to further facilitate the pt informed decision-making.  Resources and information provided and reviewed.     Patient and Caregiver is aware of [facility]'s affiliation and/or partnership with agencies in home health care, LTAC, SNF, [facility], and other hospitals and clinics.    Understanding: Patient and Caregiver reports having a clear understanding of the many lifetime commitments involved with being an LVAD and transplant recipient, including costs, compliance, medications, lab work, procedures, appointments, concrete and financial planning, preparedness, timely and appropriate communication of concerns, abstinence (ETOH, tobacco, illicit non-prescribed drugs), adherence to all health care team recommendations, support system and caregiver involvement, appropriate and timely resource utilization and follow-through, mental health counseling as needed/recommended, and patient and caregiver responsibilities.  Social Service Handbook, resources and detailed educational information provided and reviewed.  Educational information  "provided.    Patient and Caregiver also reports current and expected compliance with health care regime and states having a clear understanding of the importance of compliance.       Patient and Caregiver reports a clear understanding that risks and benefits may be involved with LVAD heart failure treatment and organ transplantation and with organ donation.     Patient and Caregiver also reports clear understanding that psychosocial risk factors may affect patient, and include but are not limited to feelings of depression, generalized anxiety, anxiety regarding dependence on others, post traumatic stress disorder, feelings of guilt and other emotional and/or mental concerns, and/or exacerbation of existing mental health concerns.  Detailed resources provided and discussed.      Patient and Caregiver agrees to access appropriate resources in a timely manner as needed and/or as recommended, and to communicate concerns appropriately.     Patient and Caregiver also reports a clear understanding of treatment options available.      Feelings or Concerns: Pt reports concerns about the LVAD: that he will not be able to swim and that he will have obvious equipment on his body. Support and education provided. Also educated pt on why some pts have to get LVAD while waiting for transplant.    Coping: Pt reports likes to decompress in a quiet environment. Pt reports he scruggs not dwell on things. Pt also watches TV and family plays "Call of Duty: Zombies" together.    Goals: Pt reports would like to see a reversal in the things taken away from him due to worsening health such as having more energy and change in personality due to not being able to do what he used to do.     Interview Behavior: Patient and Caregiver presents as alert and oriented x 4, pleasant, good eye contact, calm, communicative, cooperative and asking and answering questions appropriately.  Wife was present via speaker phone for caregiver education and at end of " assessment. Wife states ability to attend future appts with pt.       Transplant Social Work - Candidacy  Assessment/Plan:     Psychosocial Suitability: Patient does not present as a suitable candidate for transplant at this time. Pt will need to brings back up caregiver to clinic for in person caregiver education and pt will need to demonstrate adherence to all aspects of medical plan in order to be deemed psychosocially suitable for transplant. Pt does have a supportive caregiver, stable financial plan, and adequate insurance coverage.    Recommendations/Additional Comments: Pt needs to bring back up caregiver to clinic for in person education and demonstrate adherence to all aspects of medical plan.    SW requested pt have SW appointment added to pt's next clinic visit on 10/5.     Rani Dillard LCSW

## 2018-09-28 NOTE — TELEPHONE ENCOUNTER
Received notice from the Pharmacy, that Pt insurance would not cover 4 mg tablets bid and asked for new Rx for 8 mg QD

## 2018-10-01 ENCOUNTER — PATIENT MESSAGE (OUTPATIENT)
Dept: TRANSPLANT | Facility: CLINIC | Age: 53
End: 2018-10-01

## 2018-10-03 RX ORDER — LANOLIN ALCOHOL/MO/W.PET/CERES
400 CREAM (GRAM) TOPICAL 3 TIMES DAILY
Qty: 90 TABLET | Refills: 5 | Status: SHIPPED | OUTPATIENT
Start: 2018-10-03 | End: 2019-01-03 | Stop reason: SDUPTHER

## 2018-10-05 ENCOUNTER — TELEPHONE (OUTPATIENT)
Dept: TRANSPLANT | Facility: CLINIC | Age: 53
End: 2018-10-05

## 2018-10-05 ENCOUNTER — DOCUMENTATION ONLY (OUTPATIENT)
Dept: TRANSPLANT | Facility: CLINIC | Age: 53
End: 2018-10-05

## 2018-10-05 NOTE — TELEPHONE ENCOUNTER
I spoke with patient after he called trying to reschedule his appointment to find out what was going on and why was he rescheduling.Patient stated that he could not make it here until 3:15 which was well over the time his echo was scheduled for. Patient had had this appointment since last week and I had spoken to patients' wife as well to make sure he could make it here. I asked Mr. Richards what was the next day he was available to come in for his appointments and he stated that he was unsure at this time and would have to call me back after he looked at his work schedule because he had already used all  his vacation time for the year already. I told Mr. Richards when he was ready to schedule to give me a call.

## 2018-11-01 ENCOUNTER — ANTI-COAG VISIT (OUTPATIENT)
Dept: CARDIOLOGY | Facility: CLINIC | Age: 53
End: 2018-11-01

## 2018-11-01 DIAGNOSIS — Z95.811 LVAD (LEFT VENTRICULAR ASSIST DEVICE) PRESENT: ICD-10-CM

## 2018-11-01 DIAGNOSIS — Z79.01 LONG TERM (CURRENT) USE OF ANTICOAGULANTS: ICD-10-CM

## 2018-11-01 NOTE — PROGRESS NOTES
Wife ricardo advised 7.5mg We/Fr & 5mg all other days -- 7.5mg 11/1,  .  Wife rescheduled from 11/5 to 11/7

## 2018-11-01 NOTE — PROGRESS NOTES
Wife called to report that Patient showed up at [facility] but no order was in the computer, appointment for today 11/01 was booked by another department, I scheduled an appointment and linked an INR order

## 2018-11-01 NOTE — PROGRESS NOTES
Spoke with patient and wife Kelly Alegria  Who confirmed correct dose. Reports missed 10/30 dose, john salad intake, and z pack 10/28-10/29.

## 2018-11-07 ENCOUNTER — SOCIAL WORK (OUTPATIENT)
Dept: TRANSPLANT | Facility: CLINIC | Age: 53
End: 2018-11-07

## 2018-11-07 ENCOUNTER — HOSPITAL ENCOUNTER (OUTPATIENT)
Dept: CARDIOLOGY | Facility: CLINIC | Age: 53
Discharge: HOME | End: 2018-11-07
Attending: INTERNAL MEDICINE
Payer: COMMERCIAL

## 2018-11-07 ENCOUNTER — CLINICAL SUPPORT (OUTPATIENT)
Dept: TRANSPLANT | Facility: CLINIC | Age: 53
End: 2018-11-07
Payer: COMMERCIAL

## 2018-11-07 ENCOUNTER — ANTI-COAG VISIT (OUTPATIENT)
Dept: CARDIOLOGY | Facility: CLINIC | Age: 53
End: 2018-11-07

## 2018-11-07 ENCOUNTER — OFFICE VISIT (OUTPATIENT)
Dept: TRANSPLANT | Facility: CLINIC | Age: 53
End: 2018-11-07
Attending: INTERNAL MEDICINE
Payer: COMMERCIAL

## 2018-11-07 VITALS — TEMPERATURE: 99 F | WEIGHT: 240 LBS | HEIGHT: 73 IN | SYSTOLIC BLOOD PRESSURE: 78 MMHG | BODY MASS INDEX: 31.81 KG/M2

## 2018-11-07 VITALS — HEIGHT: 73 IN | WEIGHT: 236 LBS | BODY MASS INDEX: 31.28 KG/M2 | HEART RATE: 86 BPM

## 2018-11-07 DIAGNOSIS — I50.9 HEART FAILURE: ICD-10-CM

## 2018-11-07 DIAGNOSIS — I50.42 CHRONIC COMBINED SYSTOLIC AND DIASTOLIC HEART FAILURE: ICD-10-CM

## 2018-11-07 DIAGNOSIS — Z95.811 LVAD (LEFT VENTRICULAR ASSIST DEVICE) PRESENT: ICD-10-CM

## 2018-11-07 DIAGNOSIS — Z95.811 HEART REPLACED BY HEART ASSIST DEVICE: ICD-10-CM

## 2018-11-07 DIAGNOSIS — Z79.01 LONG TERM (CURRENT) USE OF ANTICOAGULANTS: ICD-10-CM

## 2018-11-07 LAB
ASCENDING AORTA: 3.06 CM
BSA FOR ECHO PROCEDURE: 2.35 M2
CV ECHO LV RWT: 0.18 CM
DOP CALC LVOT AREA: 2.92 CM2
DOP CALC LVOT DIAMETER: 1.93 CM
E WAVE DECELERATION TIME: 134.65 MSEC
E/A RATIO: 1.44
E/E' RATIO: 19.56
ECHO LV POSTERIOR WALL: 0.86 CM (ref 0.6–1.1)
FRACTIONAL SHORTENING: 1 % (ref 28–44)
INTERVENTRICULAR SEPTUM: 0.9 CM (ref 0.6–1.1)
IVRT: 0.08 MSEC
LA MAJOR: 7.69 CM
LA MINOR: 6.96 CM
LA WIDTH: 6.04 CM
LEFT ATRIUM SIZE: 5.42 CM
LEFT ATRIUM VOLUME INDEX: 86.5 ML/M2
LEFT ATRIUM VOLUME: 203.32 CM3
LEFT INTERNAL DIMENSION IN SYSTOLE: 9.2 CM (ref 2.1–4)
LEFT VENTRICLE DIASTOLIC VOLUME INDEX: 281.7 ML/M2
LEFT VENTRICLE DIASTOLIC VOLUME: 662 ML
LEFT VENTRICLE MASS INDEX: 194.5 G/M2
LEFT VENTRICLE SYSTOLIC VOLUME INDEX: 265.5 ML/M2
LEFT VENTRICLE SYSTOLIC VOLUME: 623.86 ML
LEFT VENTRICULAR INTERNAL DIMENSION IN DIASTOLE: 9.3 CM (ref 3.5–6)
LEFT VENTRICULAR MASS: 456.99 G
LV LATERAL E/E' RATIO: 22
LV SEPTAL E/E' RATIO: 17.6
MV PEAK A VEL: 0.61 M/S
MV PEAK E VEL: 0.88 M/S
MV STENOSIS PRESSURE HALF TIME: 39.05 MS
MV VALVE AREA P 1/2 METHOD: 5.63 CM2
PISA TR MAX VEL: 2.25 M/S
PULM VEIN S/D RATIO: 0.84
PV PEAK D VEL: 0.58 M/S
PV PEAK S VEL: 0.49 M/S
RA MAJOR: 6.56 CM
RA WIDTH: 4.14 CM
RIGHT VENTRICULAR END-DIASTOLIC DIMENSION: 4.29 CM
SINUS: 3.11 CM
STJ: 2.62 CM
TDI LATERAL: 0.04
TDI SEPTAL: 0.05
TDI: 0.05
TR MAX PG: 20.25 MMHG
TRICUSPID ANNULAR PLANE SYSTOLIC EXCURSION: 1.64 CM

## 2018-11-07 PROCEDURE — 93306 TTE W/DOPPLER COMPLETE: CPT | Mod: S$GLB,,, | Performed by: INTERNAL MEDICINE

## 2018-11-07 PROCEDURE — 93750 INTERROGATION VAD IN PERSON: CPT | Mod: S$GLB,,, | Performed by: INTERNAL MEDICINE

## 2018-11-07 PROCEDURE — 99214 OFFICE O/P EST MOD 30 MIN: CPT | Mod: 25,S$GLB,, | Performed by: INTERNAL MEDICINE

## 2018-11-07 PROCEDURE — 99999 PR PBB SHADOW E&M-EST. PATIENT-LVL III: CPT | Mod: PBBFAC,,, | Performed by: INTERNAL MEDICINE

## 2018-11-07 RX ORDER — AMLODIPINE BESYLATE 10 MG/1
10 TABLET ORAL DAILY
Qty: 90 TABLET | Refills: 6 | Status: ON HOLD | OUTPATIENT
Start: 2018-11-07 | End: 2019-07-23 | Stop reason: HOSPADM

## 2018-11-07 NOTE — PROGRESS NOTES
SW followed up with pt regarding psychosocial suitability for transplant.    Pt did not bring back-up caregiver as instructed. Pt was alone today. Pt has also continued to miss coumadin clinic lab draws.    SW provided education to pt regarding the need for SW to meet with pt primary and back-up caregivers. Pt stated his stepson is his going to be his back-up, but is offshore working, and that is why he didn't bring him.    Pt also educated that he needs to show compliance with all aspects of his plan of care to be considered for transplant.    Pt educated to call his coordinator when he is ready to meet with SW again with primary and back-up caregivers and stated he will exhibit compliance.    Pt also educated that this is just the psychosocial portion of his eval for transplant, and does not include any additional medical concerns.    Pt verbalized understanding of the above. Pt aware it is up to him at this point.    SW provided much support, education and resources. SW continues to follow and remains available.    Pt remains unsuitable for transplant at this time.

## 2018-11-07 NOTE — PROGRESS NOTES
"Date of Implant with Heartmate II LVAD: 3/8/18    PATIENT ARRIVED IN CLINIC:  Ambulatory  Accompanied by: Self    Vitals  Doppler: 78/0  PAIN: 0 on 0-10 pain scale, location of pain: 0, description of pain: 0  Is patient currently on medications for pain? no What kind?    VAD Interrogation:  TXP SACHI INTERROGATIONS 11/7/2018   Type HeartMate II   Flow .6.5   Speed 9400   PI 3.5   Power (Jackson) 6.5   LSL 9000   Pulsatility No Pulse       Flow in history:   Waveforms: [id number]  History Log:     Problems / Issues / Alarms with VAD if any: None noted  Any Equipment Issues: None noted (Refer to  note for complete details)   Emergency Equipment With Patient: Yes    VAD Binder With Patient: yes   Reviewed VAD Numbers In Binder: yes  VAD Sounds: Smooth  Manual & Visual Inspection Of Driveline: No kinks or tears noted     LVAD Dressing/DLES:  Appearance Of Driveline: "1"  Antibiotics: NO  Frequency of Dressing Changes: daily & soap and water dressing  Stabilization Device In Use: yes, sun securement device     Pt In Need Of Management Kits? Yes - 1 box soap and water  It is medically necessary to have VAD management kits in order to prevent infection or to assist in the healing of an infected DLES.    Assessment:   Complaints/reason for visit today:routine  Complaints Of Nausea / Vomiting: None noted  Appearance and Frequency Of Stools: normal and formed without blood & daily  Color Of Urine: clear/yellow  Coping/Depression/Anxiety: coping okay  Sleep Habits: 8 hrs /night  Sleep Aids: None noted  Showering: Yes, reminded to change dressing immediately after drying off     Activity/Exercise: Walking   Driving: Yes. Reminded to pull over should there be an alarm before looking down at controller.    Labs:    Chemistry        Component Value Date/Time     (L) 11/07/2018 1208    K 4.1 11/07/2018 1208     11/07/2018 1208    CO2 24 11/07/2018 1208    BUN 17 11/07/2018 1208    CREATININE 1.8 " (H) 11/07/2018 1208    GLU 82 11/07/2018 1208        Component Value Date/Time    CALCIUM 10.0 11/07/2018 1208    ALKPHOS 94 11/07/2018 1208    AST 22 11/07/2018 1208    ALT 15 11/07/2018 1208    BILITOT 0.8 11/07/2018 1208    ESTGFRAFRICA 49.3 (A) 11/07/2018 1208    EGFRNONAA 42.6 (A) 11/07/2018 1208            Magnesium   Date Value Ref Range Status   11/07/2018 2.3 1.6 - 2.6 mg/dL Final       Lab Results   Component Value Date    WBC 7.55 11/07/2018    HGB 12.5 (L) 11/07/2018    HCT 41.2 11/07/2018    MCV 84 11/07/2018     11/07/2018       Lab Results   Component Value Date    INR 2.3 (H) 11/07/2018    INR 1.6 (H) 11/01/2018    INR 1.9 (H) 09/27/2018       BNP   Date Value Ref Range Status   11/07/2018 389 (H) 0 - 99 pg/mL Final     Comment:     Values of less than 100 pg/ml are consistent with non-CHF populations.   09/27/2018 424 (H) 0 - 99 pg/mL Final     Comment:     Values of less than 100 pg/ml are consistent with non-CHF populations.   08/23/2018 409 (H) 0 - 99 pg/mL Final     Comment:     Values of less than 100 pg/ml are consistent with non-CHF populations.       LD   Date Value Ref Range Status   11/07/2018 243 110 - 260 U/L Final     Comment:     Results are increased in hemolyzed samples.   09/27/2018 275 (H) 110 - 260 U/L Final     Comment:     Results are increased in hemolyzed samples.   08/23/2018 219 (H) 84 - 195 U/L Final     Comment:     Results are increased in hemolyzed samples.       Labs reviewed with patient: YES     Patient Satisfaction Survey Completed by Patient: yes  (explained about signature and box to check)  Medication reconciliation: per MA.  Purple card updated today: yes  Coumadin Managed by: [facility]    Education: Reviewed driveline care, emergency procedures, how to change the controller, alarms with patient, as well as discussed how to page the VAD coordinator in case of an emergency.      Plans/Needs: RTC in one month with Echo. Increased speed to 9600  today after Dr. Connors reviewed Echo. Patient overall doing well, met with Social work and pre OHT coordinators.     Hurricane Season: No

## 2018-11-07 NOTE — LETTER
November 7, 2018        Nader Chiu  [address]  [address]  [place] [address]  Phone: [phone]  Fax: [phone]             [facility] [address] [place] [address]  Phone: [phone]   Patient: Tim Richards   MR Number: [id number]   YOB: 1965   Date of Visit: 11/7/2018       Dear Dr. Nader Cihu    Thank you for referring Tim Richards to me for evaluation. Attached you will find relevant portions of my assessment and plan of care.    If you have questions, please do not hesitate to call me. I look forward to following Tim Richards along with you.    Sincerely,    Bettye Connors MD    Enclosure    If you would like to receive this communication electronically, please contact [email or web address] or [phone] to request Let's Gift It Link access.    Let's Gift It Link is a tool which provides read-only access to select patient information with whom you have a relationship. Its easy to use and provides real time access to review your patients record including encounter summaries, notes, results, and demographic information.    If you feel you have received this communication in error or would no longer like to receive these types of communications, please e-mail [email or web address]

## 2018-11-07 NOTE — PROGRESS NOTES
Subjective:   Patient ID:  Tim Richards is a 52 y.o. male who presents for LVAD followup visit.    Implant Date: 3/8/2018  Initials:RBB     Heartmate II RPM 9400  3/9/18 outflow graft changed     INR goal: 2-3   Bridge with Heparin   Antiplatelets: , prevent trial    TXP SACHI INTERROGATIONS 11/7/2018   Type HeartMate II   Flow .6.5   Speed 9400   PI 3.5   Power (Jackson) 6.5   LSL 9000   Pulsatility No Pulse       HPI  51 yo BM with DCM, HBP, CHF stage D s/p HM 2 comes for routine visit.  At visit in May 2018 LVAD speed increased from 9200 to 9400. At his last visit Dr Hadley increased his cardura to 4 BID with plans to increase speed today if BP controlled to help with fatigue.     Today feels that his mild fatigue is unchanged  DLES one with no alarms No edema with no real change in clinic weight since last visit  Has lost close to 20 lbs since April 2018 through diet / exercise     Echo Nov 2018  · LVAD present. Base speed is 9400. The pump type is a Heartmate II.  · The left ventricle cavity is severely dilated (LVEDD 9.6)   · Left ventricle ejection fraction is severely decreased at 15%  · RV systolic function is mildly to moderately reduced.  · Moderate-to-severe mitral regurgitation.  ·   Review of Systems   Constitution: Positive for malaise/fatigue (some fatigue) and weight loss. Negative for chills, fever, weakness, night sweats and weight gain.   HENT: Negative for congestion, hearing loss, hoarse voice, nosebleeds and sore throat.    Eyes: Negative for double vision, pain, vision loss in left eye and vision loss in right eye.   Cardiovascular: Positive for dyspnea on exertion (not significant per pt). Negative for chest pain, claudication, irregular heartbeat, leg swelling, near-syncope, orthopnea, palpitations, paroxysmal nocturnal dyspnea and syncope.   Respiratory: Negative for cough, hemoptysis, shortness of breath, sleep disturbances due to breathing, snoring, sputum production and  "wheezing.    Endocrine: Negative for cold intolerance, heat intolerance, polydipsia and polyuria.   Hematologic/Lymphatic: Negative for bleeding problem. Does not bruise/bleed easily.   Musculoskeletal: Negative for falls, muscle cramps, myalgias and neck pain.   Gastrointestinal: Negative for bloating, abdominal pain, change in bowel habit, constipation, diarrhea, hematochezia, jaundice, melena and nausea.   Genitourinary: Negative for bladder incontinence, dysuria, frequency, hematuria, hesitancy, incomplete emptying and urgency.   Neurological: Negative for brief paralysis, dizziness, focal weakness, headaches, numbness, paresthesias and seizures.   Psychiatric/Behavioral: Negative for depression and memory loss. The patient is not nervous/anxious.      Objective:     Doppler: 78 mm Hg  Physical Exam   Constitutional: He appears well-developed and well-nourished. No distress.   BP (!) 78/0 (BP Location: Left arm, Patient Position: Sitting, BP Method: Medium (Manual)) Comment: doppler  Temp 99 °F (37.2 °C) (Oral)   Ht 6' 1" (1.854 m)   Wt 108.9 kg (240 lb)   BMI 31.66 kg/m²      HENT:   Head: Normocephalic and atraumatic.   Eyes: Conjunctivae are normal. No scleral icterus.   Neck: No JVD (better than last visit 8 but has large neck ) present. No thyromegaly present.   Cardiovascular:   Normal VAD sounds; DL 1   Pulmonary/Chest: Effort normal and breath sounds normal. No respiratory distress. He has no wheezes. He has no rales.   Abdominal: Soft. Bowel sounds are normal. He exhibits no distension and no mass. There is no tenderness. There is no rebound and no guarding.   Musculoskeletal: He exhibits no edema or tenderness.   Neurological: He is alert.   Skin: Skin is warm and dry. He is not diaphoretic.   Psychiatric: He has a normal mood and affect. His behavior is normal. Judgment and thought content normal.     Lab Results   Component Value Date     (H) 11/07/2018     (L) 11/07/2018    K 4.1 " 11/07/2018    MG 2.3 11/07/2018     11/07/2018    CO2 24 11/07/2018    BUN 17 11/07/2018    CREATININE 1.8 (H) 11/07/2018    GLU 82 11/07/2018    HGBA1C 5.5 03/08/2018    AST 22 11/07/2018    ALT 15 11/07/2018    ALBUMIN 3.7 11/07/2018    PROT 8.0 11/07/2018    BILITOT 0.8 11/07/2018    CHOL 150 11/07/2018    HDL 85 (H) 11/07/2018    LDLCALC 53.0 (L) 11/07/2018    TRIG 60 11/07/2018       Magnesium   Date Value Ref Range Status   11/07/2018 2.3 1.6 - 2.6 mg/dL Final       Lab Results   Component Value Date    WBC 7.55 11/07/2018    HGB 12.5 (L) 11/07/2018    HCT 41.2 11/07/2018    MCV 84 11/07/2018     11/07/2018       Lab Results   Component Value Date    INR 2.3 (H) 11/07/2018    INR 1.6 (H) 11/01/2018    INR 1.9 (H) 09/27/2018       BNP   Date Value Ref Range Status   11/07/2018 389 (H) 0 - 99 pg/mL Final     Comment:     Values of less than 100 pg/ml are consistent with non-CHF populations.   09/27/2018 424 (H) 0 - 99 pg/mL Final     Comment:     Values of less than 100 pg/ml are consistent with non-CHF populations.   08/23/2018 409 (H) 0 - 99 pg/mL Final     Comment:     Values of less than 100 pg/ml are consistent with non-CHF populations.       LD   Date Value Ref Range Status   11/07/2018 243 110 - 260 U/L Final     Comment:     Results are increased in hemolyzed samples.   09/27/2018 275 (H) 110 - 260 U/L Final     Comment:     Results are increased in hemolyzed samples.   08/23/2018 219 (H) 84 - 195 U/L Final     Comment:     Results are increased in hemolyzed samples.           Assessment:      1. LVAD (left ventricular assist device) present    2. Examination of participant in clinical trial    3. Chronic systolic heart failure    4. High risk medications (amiodarone) long-term use    5. PVT (paroxysmal ventricular tachycardia)    6. Biventricular implantable cardioverter-defibrillator in situ    7. Dilated cardiomyopathy    8. CKD (chronic kidney disease), stage III    9. Hypertensive  cardiovascular-renal disease, stage 1-4 or unspecified chronic kidney disease, with heart failure      Plan:     HTN - controlled for the first time in a while   LVAD will increase speed to 9600 based on fact that he reports he felt much more energy after increase from 9200 to 9400 last time Suspect that any increase this time could be due to better BP control but time will tell  Repeat echo next time to see if we can see any improvement in mitral regurg  We may not see much change due to size of LV  (I reviewed prior echo with dr Cortez  As Far back as May 2018 (speed then 9200) his echo have had severe MR)    UNOS Patient Status  Functional Status: 80% - Normal activity with effort: some symptoms of disease  Physical Capacity: No Limitations  Working for Income: yes  If yes, working activity level: Working Full Time    Listed for transplant: No needs to be smoke free for six months starting in March 2018 Will check at next visit in two week   Meet with peter from pre today to gather info / answer questions

## 2018-11-07 NOTE — PROGRESS NOTES
Patient wife ricardo  advised of dose instructions 7.5mg We/Fr & 5mg all other days --   resume and verbalized understanding.  Will recheck  11 / 14 / 18 wife stated she will give clinic a call if unable to make appointment   .

## 2018-11-07 NOTE — PROCEDURES
TXP SACHI INTERROGATIONS 11/7/2018 9/27/2018 8/9/2018 7/13/2018 6/16/2018 6/6/2018 5/17/2018   Type HeartMate II HeartMate II HeartMate II HeartMate II - HeartMate II HeartMate II   Flow .6.5 6.9 6.3 6.9 - 7.0 6.1   Speed 9400 9400 9400 9200 - 9200 9200   PI 3.5 4.0 3.5 4.4 - 3.7 5.2   Power (Jackson) 6.5 6.7 6.4 6.5 - 6.6 6.3   LSL 9000 9000 9000 8800 - 8800 8800   Pulsatility No Pulse No Pulse No Pulse Pulse Intermittent pulse Pulse No Pulse   }

## 2018-11-14 DIAGNOSIS — F17.210 CIGARETTE NICOTINE DEPENDENCE WITHOUT COMPLICATION: Primary | ICD-10-CM

## 2018-11-14 NOTE — PROGRESS NOTES
Prior nicotine order that was linked to lab appt for today no longer showing as available.  Unable to relink prior order.      Nicotine order re-entered and linked to appt.

## 2018-11-15 ENCOUNTER — DOCUMENTATION ONLY (OUTPATIENT)
Dept: TRANSPLANT | Facility: CLINIC | Age: 53
End: 2018-11-15

## 2018-11-15 ENCOUNTER — ANTI-COAG VISIT (OUTPATIENT)
Dept: CARDIOLOGY | Facility: CLINIC | Age: 53
End: 2018-11-15

## 2018-11-15 DIAGNOSIS — Z95.811 LVAD (LEFT VENTRICULAR ASSIST DEVICE) PRESENT: ICD-10-CM

## 2018-11-15 DIAGNOSIS — Z79.01 LONG TERM (CURRENT) USE OF ANTICOAGULANTS: ICD-10-CM

## 2018-11-15 NOTE — PROGRESS NOTES
"Noted that pt's nicotine level that was re-linked to pt's lab draw yesterday was not drawn.   Spoke with  Pankaj Wilhelm who reports pt asked which labs were being drawn and then refused to let the nicotine level be drawn stating,  "I'm only here for my INR to be drawn, the other is ONLY supposed to be drawn at the beginning of the month"   Pankaj reports pt was " a bit smart-mouthed about it".     "

## 2018-11-15 NOTE — PROGRESS NOTES
Wife ricardo questioned and confirmed dose .  She reports no missed doses, and then stated she doesn't know if patient ate any greens or etc.  Stated pt test once a week   Reported no other changes .

## 2018-11-21 ENCOUNTER — ANTI-COAG VISIT (OUTPATIENT)
Dept: CARDIOLOGY | Facility: CLINIC | Age: 53
End: 2018-11-21

## 2018-11-21 ENCOUNTER — DOCUMENTATION ONLY (OUTPATIENT)
Dept: TRANSPLANT | Facility: CLINIC | Age: 53
End: 2018-11-21

## 2018-11-21 DIAGNOSIS — Z95.811 LVAD (LEFT VENTRICULAR ASSIST DEVICE) PRESENT: ICD-10-CM

## 2018-11-21 DIAGNOSIS — Z79.01 LONG TERM (CURRENT) USE OF ANTICOAGULANTS: ICD-10-CM

## 2018-11-21 NOTE — PROGRESS NOTES
Received call from  Pankaj at [facility] stating pt was there today for INR check and again VERY rude and yelled at her.     She reports the extra tube for nicotine check was drawn today.

## 2018-12-03 ENCOUNTER — EPISODE CHANGES (OUTPATIENT)
Dept: TRANSPLANT | Facility: CLINIC | Age: 53
End: 2018-12-03

## 2018-12-05 ENCOUNTER — TELEPHONE (OUTPATIENT)
Dept: RESEARCH | Facility: HOSPITAL | Age: 53
End: 2018-12-05

## 2018-12-05 NOTE — TELEPHONE ENCOUNTER
Spoke with patient's wife and reminded her that the patient is scheduled for his 9 month visit for the Prevent II trial tomorrow 12/6/18. Instructed wife to have patient bring in all study drug medications and he will receive a refill tomorrow. Wife verbalized understanding and confirmed appts.

## 2018-12-06 ENCOUNTER — RESEARCH ENCOUNTER (OUTPATIENT)
Dept: RESEARCH | Facility: HOSPITAL | Age: 53
End: 2018-12-06

## 2018-12-06 ENCOUNTER — ANTI-COAG VISIT (OUTPATIENT)
Dept: CARDIOLOGY | Facility: CLINIC | Age: 53
End: 2018-12-06

## 2018-12-06 ENCOUNTER — HOSPITAL ENCOUNTER (OUTPATIENT)
Dept: CARDIOLOGY | Facility: CLINIC | Age: 53
Discharge: HOME | End: 2018-12-06
Attending: INTERNAL MEDICINE
Payer: COMMERCIAL

## 2018-12-06 ENCOUNTER — CLINICAL SUPPORT (OUTPATIENT)
Dept: TRANSPLANT | Facility: CLINIC | Age: 53
End: 2018-12-06
Payer: COMMERCIAL

## 2018-12-06 ENCOUNTER — OFFICE VISIT (OUTPATIENT)
Dept: TRANSPLANT | Facility: CLINIC | Age: 53
End: 2018-12-06
Payer: COMMERCIAL

## 2018-12-06 VITALS
BODY MASS INDEX: 31.81 KG/M2 | HEART RATE: 80 BPM | BODY MASS INDEX: 32.34 KG/M2 | HEART RATE: 68 BPM | TEMPERATURE: 98 F | HEIGHT: 73 IN | WEIGHT: 244 LBS | SYSTOLIC BLOOD PRESSURE: 90 MMHG | HEIGHT: 73 IN | WEIGHT: 240 LBS | SYSTOLIC BLOOD PRESSURE: 90 MMHG

## 2018-12-06 DIAGNOSIS — Z95.811 LVAD (LEFT VENTRICULAR ASSIST DEVICE) PRESENT: ICD-10-CM

## 2018-12-06 DIAGNOSIS — N18.30 CKD (CHRONIC KIDNEY DISEASE), STAGE III: ICD-10-CM

## 2018-12-06 DIAGNOSIS — Z00.6 EXAMINATION OF PARTICIPANT IN CLINICAL TRIAL: ICD-10-CM

## 2018-12-06 DIAGNOSIS — I10 HYPERTENSION, UNSPECIFIED TYPE: ICD-10-CM

## 2018-12-06 DIAGNOSIS — Z79.01 LONG TERM (CURRENT) USE OF ANTICOAGULANTS: ICD-10-CM

## 2018-12-06 DIAGNOSIS — I50.42 CHRONIC COMBINED SYSTOLIC AND DIASTOLIC HEART FAILURE: ICD-10-CM

## 2018-12-06 DIAGNOSIS — I42.0 DILATED CARDIOMYOPATHY: Primary | ICD-10-CM

## 2018-12-06 LAB
ASCENDING AORTA: 3.08 CM
BSA FOR ECHO PROCEDURE: 2.37 M2
CV ECHO LV RWT: 0.23 CM
DOP CALC LVOT AREA: 3.8 CM2
DOP CALC LVOT DIAMETER: 2.2 CM
E WAVE DECELERATION TIME: 165.9 MSEC
E/A RATIO: 0.96
E/E' RATIO: 15.64
ECHO LV POSTERIOR WALL: 0.94 CM (ref 0.6–1.1)
FRACTIONAL SHORTENING: 4 % (ref 28–44)
INTERVENTRICULAR SEPTUM: 0.93 CM (ref 0.6–1.1)
LA MAJOR: 6.75 CM
LA MINOR: 6.53 CM
LA WIDTH: 5.1 CM
LEFT ATRIUM SIZE: 4.68 CM
LEFT ATRIUM VOLUME INDEX: 57.9 ML/M2
LEFT ATRIUM VOLUME: 134.67 CM3
LEFT INTERNAL DIMENSION IN SYSTOLE: 7.76 CM (ref 2.1–4)
LEFT VENTRICLE DIASTOLIC VOLUME INDEX: 150.67 ML/M2
LEFT VENTRICLE DIASTOLIC VOLUME: 350.32 ML
LEFT VENTRICLE MASS INDEX: 163.3 G/M2
LEFT VENTRICLE SYSTOLIC VOLUME INDEX: 138.4 ML/M2
LEFT VENTRICLE SYSTOLIC VOLUME: 321.7 ML
LEFT VENTRICULAR INTERNAL DIMENSION IN DIASTOLE: 8.06 CM (ref 3.5–6)
LEFT VENTRICULAR MASS: 379.61 G
LV LATERAL E/E' RATIO: 17.2
LV SEPTAL E/E' RATIO: 14.33
MV PEAK A VEL: 0.9 M/S
MV PEAK E VEL: 0.86 M/S
PISA TR MAX VEL: 2.52 M/S
RA MAJOR: 5.31 CM
RA PRESSURE: 8 MMHG
RA WIDTH: 4.1 CM
RIGHT VENTRICULAR END-DIASTOLIC DIMENSION: 4.85 CM
SINUS: 3.1 CM
STJ: 2.78 CM
TDI LATERAL: 0.05
TDI SEPTAL: 0.06
TDI: 0.06
TR MAX PG: 25.4 MMHG
TRICUSPID ANNULAR PLANE SYSTOLIC EXCURSION: 1.6 CM
TV REST PULMONARY ARTERY PRESSURE: 33.4 MMHG

## 2018-12-06 PROCEDURE — 93306 TTE W/DOPPLER COMPLETE: CPT | Mod: S$GLB,,, | Performed by: INTERNAL MEDICINE

## 2018-12-06 PROCEDURE — 99214 OFFICE O/P EST MOD 30 MIN: CPT | Mod: S$GLB,,, | Performed by: INTERNAL MEDICINE

## 2018-12-06 PROCEDURE — 99999 PR PBB SHADOW E&M-EST. PATIENT-LVL III: CPT | Mod: PBBFAC,,, | Performed by: INTERNAL MEDICINE

## 2018-12-06 RX ORDER — ENOXAPARIN SODIUM 100 MG/ML
60 INJECTION SUBCUTANEOUS 2 TIMES DAILY
Qty: 10 SYRINGE | Refills: 1 | Status: SHIPPED | OUTPATIENT
Start: 2018-12-06 | End: 2018-12-06 | Stop reason: SDUPTHER

## 2018-12-06 RX ORDER — ENOXAPARIN SODIUM 100 MG/ML
60 INJECTION SUBCUTANEOUS 2 TIMES DAILY
Qty: 6 ML | Refills: 1 | Status: SHIPPED | OUTPATIENT
Start: 2018-12-06 | End: 2019-03-21

## 2018-12-06 NOTE — PROGRESS NOTES
Consent obtained. 22 g sl started in right forearm for optison use. optison given ivp via sl for imaging. Denies transfusion rxn. Tolerated well. Sl d/haseeb after. Pressure applied.

## 2018-12-06 NOTE — PROGRESS NOTES
"Date of Implant with Heartmate II LVAD: 3/8/18    PATIENT ARRIVED IN CLINIC:  Ambulatory   Accompanied by:     Vitals  Doppler: 90/0  PAIN: 0 on 0-10 pain scale, location of pain: 0, description of pain: 0  Is patient currently on medications for pain? no What kind?    VAD Interrogation:  TXP SACHI INTERROGATIONS 12/6/2018   Type HeartMate II   Flow 6.5   Speed 9600   PI 4.5   Power (Jackson) 6.7   LSL 9200   Pulsatility Pulse       Flow in history:   Waveforms: [id number]  History Log:     Problems / Issues / Alarms with VAD if any: None noted  Any Equipment Issues: None noted (Refer to  note for complete details)   Emergency Equipment With Patient: Yes    VAD Binder With Patient: yes   Reviewed VAD Numbers In Binder: yes  VAD Sounds: Smooth  Manual & Visual Inspection Of Driveline: No kinks or tears noted     LVAD Dressing/DLES:  Appearance Of Driveline: "1"  Antibiotics: NO  Frequency of Dressing Changes: twice per week & soap and water dressing  Stabilization Device In Use: yes, sun securement device     Pt In Need Of Management Kits? Yes - 1 soap and water  It is medically necessary to have VAD management kits in order to prevent infection or to assist in the healing of an infected DLES.    Assessment:   Complaints/reason for visit today:routine  Complaints Of Nausea / Vomiting: None noted  Appearance and Frequency Of Stools: normal and formed without blood & daily  Color Of Urine: clear/yellow  Coping/Depression/Anxiety: coping okay  Sleep Habits: 8 hrs /night  Sleep Aids: None noted  Showering: Yes, reminded to change dressing immediately after drying off     Activity/Exercise: walking   Driving: Yes. Reminded to pull over should there be an alarm before looking down at controller.    Labs:    Chemistry        Component Value Date/Time     12/06/2018 1234    K 4.0 12/06/2018 1234     12/06/2018 1234    CO2 24 12/06/2018 1234    BUN 27 (H) 12/06/2018 1234    CREATININE 1.9 (H) " 12/06/2018 1234     12/06/2018 1234        Component Value Date/Time    CALCIUM 9.7 12/06/2018 1234    ALKPHOS 98 12/06/2018 1234    AST 26 12/06/2018 1234    ALT 19 12/06/2018 1234    BILITOT 0.9 12/06/2018 1234    ESTGFRAFRICA 45.8 (A) 12/06/2018 1234    EGFRNONAA 39.7 (A) 12/06/2018 1234            Magnesium   Date Value Ref Range Status   12/06/2018 2.5 1.6 - 2.6 mg/dL Final       Lab Results   Component Value Date    WBC 4.76 12/06/2018    HGB 13.3 (L) 12/06/2018    HCT 42.7 12/06/2018    MCV 85 12/06/2018     12/06/2018       Lab Results   Component Value Date    INR 1.5 (H) 12/06/2018    INR 2.6 (H) 11/21/2018    INR 1.6 (H) 11/14/2018       BNP   Date Value Ref Range Status   12/06/2018 292 (H) 0 - 99 pg/mL Final     Comment:     Values of less than 100 pg/ml are consistent with non-CHF populations.   11/21/2018 335 (H) 0 - 99 pg/mL Final     Comment:     Values of less than 100 pg/ml are consistent with non-CHF populations.   11/07/2018 389 (H) 0 - 99 pg/mL Final     Comment:     Values of less than 100 pg/ml are consistent with non-CHF populations.       LD   Date Value Ref Range Status   12/06/2018 279 (H) 110 - 260 U/L Final     Comment:     Results are increased in hemolyzed samples.   11/07/2018 243 110 - 260 U/L Final     Comment:     Results are increased in hemolyzed samples.   09/27/2018 275 (H) 110 - 260 U/L Final     Comment:     Results are increased in hemolyzed samples.       Labs reviewed with patient: YES     Patient Satisfaction Survey Completed by Patient: yes  (explained about signature and box to check)  Medication reconciliation: per MA.  Purple card updated today: no  Coumadin Managed by: [facility]    Education: Reviewed driveline care, emergency procedures, how to change the controller, alarms with patient, as well as discussed how to page the VAD coordinator in case of an emergency.      Plans/Needs: Patient seen by Kerri Chapman RN in clinic. RTC in 1  month. Patient to start lovenox today.  Magi and Dr. Rhodes spoke about nicotine screening.     Hurricane Season: No

## 2018-12-06 NOTE — PROGRESS NOTES
Subjective:   Patient ID:  Tim Richards is a 53 y.o. male who presents for LVAD followup visit.    Implant Date: 3/8/2018  Initials:RBB     Heartmate II RPM 9400  3/9/18 outflow graft changed     INR goal: 2-3   Bridge with Heparin   Antiplatelets: , prevent trial    TXP SACHI INTERROGATIONS 12/6/2018   Type HeartMate II   Flow 6.5   Speed 9600   PI 4.5   Power (Jackson) 6.7   LSL 9200   Pulsatility Pulse       HPI  51 yo BM with DCM, HBP, CHF stage D s/p HM 2 comes for routine visit.  Patient has not been compliant with diet, states he does not remember what is high K and what is low K, also has cancelled his nicotine test twice, states his wife still smokes and that he himself is not smoking, seems a little strange that he has cancelled so often. Discussed his compliance with diet and management, he states he is going to improve on this.     12/06/18 TTE:  Speed 9600rpm  AV not opening, septum midline, LVEF 10%, mod to severe RV systolic reduced function, PASP 33.4mm Hg, mild MR, trace TR, intermediate CVP.    Review of Systems   Constitution: Positive for weight loss (intentional per patient). Negative for chills, decreased appetite, diaphoresis, fever, weakness, malaise/fatigue (improved with increased speed), night sweats and weight gain.   HENT: Negative for congestion, hearing loss, hoarse voice, nosebleeds and sore throat.    Eyes: Negative for blurred vision, double vision, pain, vision loss in left eye, vision loss in right eye and visual disturbance.   Cardiovascular: Positive for dyspnea on exertion (improved/stable). Negative for chest pain, claudication, irregular heartbeat, leg swelling, near-syncope, orthopnea, palpitations, paroxysmal nocturnal dyspnea and syncope.   Respiratory: Negative for cough, hemoptysis, shortness of breath, sleep disturbances due to breathing, snoring, sputum production and wheezing.    Endocrine: Negative for cold intolerance, heat intolerance, polydipsia and  "polyuria.   Hematologic/Lymphatic: Negative for bleeding problem. Does not bruise/bleed easily.   Skin: Negative for poor wound healing and rash.   Musculoskeletal: Negative for arthritis, falls, joint pain, muscle cramps, muscle weakness, myalgias and neck pain.   Gastrointestinal: Negative for bloating, abdominal pain, anorexia, change in bowel habit, constipation, diarrhea, hematemesis, hematochezia, jaundice, melena, nausea and vomiting.   Genitourinary: Negative for bladder incontinence, dysuria, frequency, hematuria, hesitancy, incomplete emptying and urgency.   Neurological: Negative for brief paralysis, difficulty with concentration, excessive daytime sleepiness, dizziness, focal weakness, headaches, light-headedness, numbness, paresthesias and seizures.   Psychiatric/Behavioral: Negative for depression and memory loss. The patient does not have insomnia and is not nervous/anxious.      Objective:   Cuff 95/65 MAP 74, pulsatile  Physical Exam   Constitutional: He appears well-developed and well-nourished. No distress.   BP (!) 90/0 (BP Location: Right arm, Patient Position: Sitting, BP Method: Medium (Manual)) Comment: doppler  Pulse 80   Temp 97.9 °F (36.6 °C) (Oral)   Ht 6' 1" (1.854 m)   Wt 110.7 kg (244 lb)   BMI 32.19 kg/m²      HENT:   Head: Normocephalic and atraumatic.   Eyes: Conjunctivae are normal. No scleral icterus.   Neck: No JVD (at clavicle at 90 degrees) present. No thyromegaly present.   Cardiovascular:   Normal VAD sounds; DL 1   Pulmonary/Chest: Effort normal and breath sounds normal. No respiratory distress. He has no wheezes. He has no rales.   Abdominal: Soft. Bowel sounds are normal. He exhibits no distension and no mass. There is no tenderness. There is no rebound and no guarding.   Musculoskeletal: He exhibits no edema or tenderness.   Neurological: He is alert.   Skin: Skin is warm and dry. He is not diaphoretic.   Psychiatric: He has a normal mood and affect. His behavior is " normal. Judgment and thought content normal.   Nursing note and vitals reviewed.    Lab Results   Component Value Date     (H) 12/06/2018     12/06/2018    K 4.0 12/06/2018    MG 2.5 12/06/2018     12/06/2018    CO2 24 12/06/2018    BUN 27 (H) 12/06/2018    CREATININE 1.9 (H) 12/06/2018     12/06/2018    HGBA1C 5.5 03/08/2018    AST 26 12/06/2018    ALT 19 12/06/2018    ALBUMIN 4.0 12/06/2018    PROT 8.2 12/06/2018    BILITOT 0.9 12/06/2018    CHOL 150 11/07/2018    HDL 85 (H) 11/07/2018    LDLCALC 53.0 (L) 11/07/2018    TRIG 60 11/07/2018       Magnesium   Date Value Ref Range Status   12/06/2018 2.5 1.6 - 2.6 mg/dL Final       Lab Results   Component Value Date    WBC 4.76 12/06/2018    HGB 13.3 (L) 12/06/2018    HCT 42.7 12/06/2018    MCV 85 12/06/2018     12/06/2018       Lab Results   Component Value Date    INR 1.5 (H) 12/06/2018    INR 2.6 (H) 11/21/2018    INR 1.6 (H) 11/14/2018       BNP   Date Value Ref Range Status   12/06/2018 292 (H) 0 - 99 pg/mL Final     Comment:     Values of less than 100 pg/ml are consistent with non-CHF populations.   11/21/2018 335 (H) 0 - 99 pg/mL Final     Comment:     Values of less than 100 pg/ml are consistent with non-CHF populations.   11/07/2018 389 (H) 0 - 99 pg/mL Final     Comment:     Values of less than 100 pg/ml are consistent with non-CHF populations.       LD   Date Value Ref Range Status   12/06/2018 279 (H) 110 - 260 U/L Final     Comment:     Results are increased in hemolyzed samples.   11/07/2018 243 110 - 260 U/L Final     Comment:     Results are increased in hemolyzed samples.   09/27/2018 275 (H) 110 - 260 U/L Final     Comment:     Results are increased in hemolyzed samples.       Assessment:      1. LVAD (left ventricular assist device) present    2. Examination of participant in clinical trial    3. Chronic systolic heart failure    4. High risk medications (amiodarone) long-term use    5. PVT (paroxysmal ventricular  tachycardia)    6. Biventricular implantable cardioverter-defibrillator in situ    7. Dilated cardiomyopathy    8. CKD (chronic kidney disease), stage III    9. Hypertensive cardiovascular-renal disease, stage 1-4 or unspecified chronic kidney disease, with heart failure      Plan:     MR does appear improved on repeat echo today, and patient is feeling well, although LV size remains increased. Patient seems to be doing well from heart failure standpoint.  Recommend 2 gram sodium restriction and 1500cc fluid restriction.  Encourage physical activity with graded exercise program.  Requested patient to weigh themselves daily, and to notify us if their weight increases by more than 3 lbs in 1 day or 5 lbs in 1 week. Patient is not ready to be listed again as he did not follow nicotine testing request and most recent one is positive for cotenine. Will retest again, discussed at length with patient the need to be smoke free.   NYHA FC II.   Additionally creatinine has really been running elevated consistently, this will need to be evaluated further with nephrology.     UNOS Patient Status  Functional Status: 80% - Normal activity with effort: some symptoms of disease  Physical Capacity: No Limitations  Working for Income: yes  If yes, working activity level: Working Full Time    Listed for transplant: No needs to be smoke free for six months starting in March 2018 Will check at next visit in two week   Meet with peter from pre today to gather info / answer questions

## 2018-12-06 NOTE — PROGRESS NOTES
Patient wife ricardo questioned and confirmed dose .  She  denies any changes too his diet , medications , missed doses  or etc . Informed wife to continue regular dose if she doesn't hear from coumadin clinic. 12/6/18

## 2018-12-07 NOTE — PROGRESS NOTES
Study: Prevent II  Sponsor: [employer]  Follow-up Visit: 9 month  Date of Visit: 6DEC2018    Patient wishes to continue in study: Yes  All study protocol required CRFs completed: Yes    Mr. Richards is here for the 9 month follow up visit. Current list of medications obtained and verified; he denies any hospitalizations, ED visits, or any other adverse events since previous study follow-up. All questions answered to his satisfaction and he will contact research staff if he has any questions or concerns. Next follow up visit is in 3 months.     The following tests and procedures are related to research study:  Physician exam, labs, Echo

## 2018-12-10 ENCOUNTER — ANTI-COAG VISIT (OUTPATIENT)
Dept: CARDIOLOGY | Facility: CLINIC | Age: 53
End: 2018-12-10

## 2018-12-10 DIAGNOSIS — Z79.01 LONG TERM (CURRENT) USE OF ANTICOAGULANTS: ICD-10-CM

## 2018-12-10 DIAGNOSIS — Z95.811 LVAD (LEFT VENTRICULAR ASSIST DEVICE) PRESENT: ICD-10-CM

## 2018-12-13 ENCOUNTER — ANTI-COAG VISIT (OUTPATIENT)
Dept: CARDIOLOGY | Facility: CLINIC | Age: 53
End: 2018-12-13

## 2018-12-13 DIAGNOSIS — Z95.811 LVAD (LEFT VENTRICULAR ASSIST DEVICE) PRESENT: ICD-10-CM

## 2018-12-13 DIAGNOSIS — Z79.01 LONG TERM (CURRENT) USE OF ANTICOAGULANTS: ICD-10-CM

## 2018-12-13 NOTE — PROGRESS NOTES
Patient wife ricardo advised of dose instructions stop lovenox 12/13 ,7.5mg We/Fr & 5mg all other days --   resume -  and verbalized understanding.  Will recheck  12 /17 /18   .

## 2018-12-17 ENCOUNTER — ANTI-COAG VISIT (OUTPATIENT)
Dept: CARDIOLOGY | Facility: CLINIC | Age: 53
End: 2018-12-17

## 2018-12-17 DIAGNOSIS — Z79.01 LONG TERM (CURRENT) USE OF ANTICOAGULANTS: ICD-10-CM

## 2018-12-17 DIAGNOSIS — Z95.811 LVAD (LEFT VENTRICULAR ASSIST DEVICE) PRESENT: ICD-10-CM

## 2018-12-17 NOTE — PROGRESS NOTES
Patient wife ricardo advised of dose instructions and verbalized understanding.  Will recheck  12/ 20 / 18 .

## 2018-12-19 NOTE — ASSESSMENT & PLAN NOTE
S/p LVAD    Bilobed Transposition Flap Text: The defect edges were debeveled with a #15 scalpel blade.  Given the location of the defect and the proximity to free margins a bilobed transposition flap was deemed most appropriate.  Using a sterile surgical marker, an appropriate bilobe flap drawn around the defect.    The area thus outlined was incised deep to adipose tissue with a #15 scalpel blade.  The skin margins were undermined to an appropriate distance in all directions utilizing iris scissors.

## 2018-12-19 NOTE — PROCEDURES
TXP SACHI INTERROGATIONS 12/6/2018 11/7/2018 9/27/2018 8/9/2018 7/13/2018 6/16/2018 6/6/2018   Type HeartMate II HeartMate II HeartMate II HeartMate II HeartMate II - HeartMate II   Flow 6.5 .6.5 6.9 6.3 6.9 - 7.0   Speed 9600 9400 9400 9400 9200 - 9200   PI 4.5 3.5 4.0 3.5 4.4 - 3.7   Power (Jackson) 6.7 6.5 6.7 6.4 6.5 - 6.6   LSL 9200 9000 9000 9000 8800 - 8800   Pulsatility Pulse No Pulse No Pulse No Pulse Pulse Intermittent pulse Pulse   }

## 2018-12-20 NOTE — PROGRESS NOTES
"[facility]  Endocrinology  Progress Note    Admit Date: 3/1/2018     Reason for Consult: Management of Hyperglycemia     Surgical Procedure and Date: 3/8/18 - LVAD placement; 3/9/18 s/p exp lap  Lab Results   Component Value Date    HGBA1C 5.5 03/08/2018       HPI: Patient is a 52 y.o. male with a diagnosis of CHF since 2011 (ICD placement with multiple interrogations) admitted in January for advanced heart failure options. Admitted with decompensated acute heart failure, elevated creatinine. Underwent LVAD 3/8/18.    Endocrinology has been consulted to assist in post op hyperglycemia.        Interval HPI:   Overnight events:  Afebrile  Critical patient.  Chest remains open.  To close today?    Patietn remains on IIP with q1h POC  Still NPO, no hypoglycemia.    Supported by pressors and ionotropes.    BP 92/63 (BP Location: Left arm, Patient Position: Lying)   Pulse 101   Temp 100.1 °F (37.8 °C) (Core (Rosie-Tito))   Resp 19   Ht 6' 1" (1.854 m)   Wt 119 kg (262 lb 5.6 oz)   SpO2 100%   BMI 34.61 kg/m²       Labs Reviewed and Include      Recent Labs  Lab 03/10/18  0400 03/10/18  0739   * 149*   CALCIUM 9.3 9.4   ALBUMIN 3.1*  --    PROT 6.1  --     136   K 3.8 4.1   CO2 27 25   CL 98 98   BUN 25* 27*   CREATININE 2.2* 2.2*   ALKPHOS 60  --    ALT 21  --    AST 56*  --    BILITOT 2.0*  --      Lab Results   Component Value Date    WBC 13.58 (H) 03/10/2018    HGB 8.6 (L) 03/10/2018    HCT 25.5 (L) 03/10/2018    MCV 84 03/10/2018     03/10/2018     No results for input(s): TSH, FREET4 in the last 168 hours.  Lab Results   Component Value Date    HGBA1C 5.5 03/08/2018       Nutritional status:   Body mass index is 34.61 kg/m².  Lab Results   Component Value Date    ALBUMIN 3.1 (L) 03/10/2018    ALBUMIN 3.6 03/09/2018    ALBUMIN 3.6 03/08/2018     Lab Results   Component Value Date    PREALBUMIN 14 (L) 03/08/2018    PREALBUMIN 29 01/23/2018       Estimated Creatinine " Clearance: 53.7 mL/min (A) (based on SCr of 2.2 mg/dL (H)).    Accu-Checks  Recent Labs      03/09/18   2109  03/09/18   2206  03/09/18   2308  03/09/18   2353  03/10/18   0058  03/10/18   0202  03/10/18   0313  03/10/18   0356  03/10/18   0508  03/10/18   0601   POCTGLUCOSE  144*  138*  142*  131*  144*  141*  139*  140*  147*  140*       Current Medications and/or Treatments Impacting Glycemic Control  Immunotherapy:  Immunosuppressants     None        Steroids:   Hormones     None        Pressors:    Autonomic Drugs     Start     Stop Route Frequency Ordered    03/08/18 1445  EPINEPHrine (ADRENALIN) 4 mg in sodium chloride 0.9% 250 mL infusion     Question Answer Comment   Titrate by: (in mcg/kg/min) 0.01    Titrate interval: (in minutes) 5    Titrate to maintain: (SBP or MAP or Cardiac Index) MAP    Greater than: (in mmHg) 60    Maximum dose: (in mcg/kg/min) 2        -- IV Continuous 03/08/18 1347    03/08/18 1445  norepinephrine 4 mg in dextrose 5% 250 mL infusion (premix) (titrating)     Question Answer Comment   Titrate by: (in mcg/kg/min) 0.01    Titrate interval: (in minutes) 5    Titrate to maintain: (MAP or SBP) MAP    Greater than: (in mmHg) 60    Maximum dose: (in mcg/kg/min) 3        -- IV Continuous 03/08/18 1347        Hyperglycemia/Diabetes Medications: Antihyperglycemics     Start     Stop Route Frequency Ordered    03/08/18 1445  insulin regular (Humulin R) 100 Units in sodium chloride 0.9% 100 mL infusion      -- IV Continuous 03/08/18 1347          ASSESSMENT and PLAN    * Acute decompensated heart failure    S/p LVAD this admission  Per HTS; avoid hypoglycemia          Hyperglycemia    BG goal 140-180 while inpatient    Patient is still in critical condition.  Will continue IV intensive protocol requiring intensive BG monitoring q1hr.   Patient does not have history of DM.  Will likely step down quickly once clinically more stable.          LVAD (left ventricular assist device) present    Per  HTS; avoid hypoglycemia        CKD (chronic kidney disease), stage III    Estimated Creatinine Clearance: 53.7 mL/min (A) (based on SCr of 2.2 mg/dL (H)).  avoid hypoglycemia  Caution with insulin stacking        Acute on chronic combined systolic and diastolic heart failure    S/p LVAD this admission  Per HTS; avoid hypoglycemia              Magalie Verma MD  Endocrinology  [facility] BENDER, Carmencita Jimenes MD,  have personally taken the history and examined the patient and agree with the resident's note as stated above.       The patient is a 28y Female complaining of dizziness.

## 2019-01-03 ENCOUNTER — HOSPITAL ENCOUNTER (OUTPATIENT)
Dept: CARDIOLOGY | Facility: CLINIC | Age: 54
Discharge: HOME | End: 2019-01-03
Attending: INTERNAL MEDICINE
Payer: COMMERCIAL

## 2019-01-03 ENCOUNTER — OFFICE VISIT (OUTPATIENT)
Dept: TRANSPLANT | Facility: CLINIC | Age: 54
End: 2019-01-03
Payer: COMMERCIAL

## 2019-01-03 ENCOUNTER — CLINICAL SUPPORT (OUTPATIENT)
Dept: TRANSPLANT | Facility: CLINIC | Age: 54
End: 2019-01-03
Payer: COMMERCIAL

## 2019-01-03 VITALS — SYSTOLIC BLOOD PRESSURE: 102 MMHG | TEMPERATURE: 99 F | BODY MASS INDEX: 32.87 KG/M2 | HEIGHT: 73 IN | WEIGHT: 248 LBS

## 2019-01-03 VITALS — BODY MASS INDEX: 31.81 KG/M2 | HEART RATE: 86 BPM | WEIGHT: 240 LBS | HEIGHT: 73 IN

## 2019-01-03 DIAGNOSIS — I50.42 CHRONIC COMBINED SYSTOLIC AND DIASTOLIC HEART FAILURE: ICD-10-CM

## 2019-01-03 DIAGNOSIS — I42.0 DILATED CARDIOMYOPATHY: ICD-10-CM

## 2019-01-03 DIAGNOSIS — I10 HYPERTENSION, UNSPECIFIED TYPE: ICD-10-CM

## 2019-01-03 DIAGNOSIS — Z95.811 LVAD (LEFT VENTRICULAR ASSIST DEVICE) PRESENT: ICD-10-CM

## 2019-01-03 DIAGNOSIS — Z95.811 LVAD (LEFT VENTRICULAR ASSIST DEVICE) PRESENT: Primary | ICD-10-CM

## 2019-01-03 LAB
ASCENDING AORTA: 3.2 CM
BSA FOR ECHO PROCEDURE: 2.37 M2
CV ECHO LV RWT: 0.18 CM
DOP CALC LVOT AREA: 4.01 CM2
DOP CALC LVOT DIAMETER: 2.26 CM
E WAVE DECELERATION TIME: 168.11 MSEC
E/A RATIO: 1.25
E/E' RATIO: 29
ECHO LV POSTERIOR WALL: 0.84 CM (ref 0.6–1.1)
FRACTIONAL SHORTENING: 5 % (ref 28–44)
INTERVENTRICULAR SEPTUM: 0.71 CM (ref 0.6–1.1)
LA MAJOR: 8.28 CM
LA MINOR: 7.62 CM
LA WIDTH: 4.45 CM
LEFT ATRIUM SIZE: 5.27 CM
LEFT ATRIUM VOLUME INDEX: 68 ML/M2
LEFT ATRIUM VOLUME: 158.2 CM3
LEFT INTERNAL DIMENSION IN SYSTOLE: 8.77 CM (ref 2.1–4)
LEFT VENTRICLE DIASTOLIC VOLUME INDEX: 201.88 ML/M2
LEFT VENTRICLE DIASTOLIC VOLUME: 469.41 ML
LEFT VENTRICLE MASS INDEX: 166.1 G/M2
LEFT VENTRICLE SYSTOLIC VOLUME INDEX: 181.8 ML/M2
LEFT VENTRICLE SYSTOLIC VOLUME: 422.74 ML
LEFT VENTRICULAR INTERNAL DIMENSION IN DIASTOLE: 9.2 CM (ref 3.5–6)
LEFT VENTRICULAR MASS: 386.32 G
LV LATERAL E/E' RATIO: 29
LV SEPTAL E/E' RATIO: 29
MV PEAK A VEL: 0.93 M/S
MV PEAK E VEL: 1.16 M/S
RA MAJOR: 5.93 CM
RA PRESSURE: 15 MMHG
RA WIDTH: 4.63 CM
RIGHT VENTRICULAR END-DIASTOLIC DIMENSION: 3.82 CM
SINUS: 3.26 CM
STJ: 2.9 CM
TDI LATERAL: 0.04
TDI SEPTAL: 0.04
TDI: 0.04
TRICUSPID ANNULAR PLANE SYSTOLIC EXCURSION: 1.1 CM

## 2019-01-03 PROCEDURE — 93306 TTE W/DOPPLER COMPLETE: CPT | Mod: S$GLB,,, | Performed by: INTERNAL MEDICINE

## 2019-01-03 PROCEDURE — 93306 TRANSTHORACIC ECHO (TTE) COMPLETE (CUPID ONLY): ICD-10-PCS | Mod: S$GLB,,, | Performed by: INTERNAL MEDICINE

## 2019-01-03 PROCEDURE — 99214 OFFICE O/P EST MOD 30 MIN: CPT | Mod: S$GLB,,, | Performed by: INTERNAL MEDICINE

## 2019-01-03 PROCEDURE — 99999 PR PBB SHADOW E&M-EST. PATIENT-LVL III: ICD-10-PCS | Mod: PBBFAC,,, | Performed by: INTERNAL MEDICINE

## 2019-01-03 PROCEDURE — 99999 PR PBB SHADOW E&M-EST. PATIENT-LVL III: CPT | Mod: PBBFAC,,, | Performed by: INTERNAL MEDICINE

## 2019-01-03 PROCEDURE — 99214 PR OFFICE/OUTPT VISIT, EST, LEVL IV, 30-39 MIN: ICD-10-PCS | Mod: S$GLB,,, | Performed by: INTERNAL MEDICINE

## 2019-01-03 RX ORDER — LANOLIN ALCOHOL/MO/W.PET/CERES
400 CREAM (GRAM) TOPICAL 3 TIMES DAILY
Qty: 90 TABLET | Refills: 5 | Status: SHIPPED | OUTPATIENT
Start: 2019-01-03 | End: 2019-02-21 | Stop reason: SDUPTHER

## 2019-01-03 NOTE — LETTER
January 3, 2019        Nader Chiu  [address]  [address]  [place] [address]  Phone: [phone]  Fax: [phone]             [facility] [address] [place] [address]  Phone: [phone]   Patient: Tim Richards   MR Number: [id number]   YOB: 1965   Date of Visit: 1/3/2019       Dear Dr. Nader Chiu    Thank you for referring Tim Richards to me for evaluation. Attached you will find relevant portions of my assessment and plan of care.    If you have questions, please do not hesitate to call me. I look forward to following Tim Richards along with you.    Sincerely,    Amparo Lopez MD    Enclosure    If you would like to receive this communication electronically, please contact [email or web address] or [phone] to request [employer] access.    [employer] is a tool which provides read-only access to select patient information with whom you have a relationship. Its easy to use and provides real time access to review your patients record including encounter summaries, notes, results, and demographic information.    If you feel you have received this communication in error or would no longer like to receive these types of communications, please e-mail [email or web address]

## 2019-01-03 NOTE — PROGRESS NOTES
I have personally taken the history and examined this patient and agree with Dr Marcial's note as stated above.    Reviewed echo today and LV remains severely enlarged, RV is small. Would like to increase speed again however BP uncontrolled today after pt ran out of cardura.  Asked him to pick it up today from our pharmacy and come back for doppler check next week- if better increase to 9800 rpm    Interrogation of Ventricular assist device was performed with physician analysis of device parameters and review of device function. I have personally reviewed the interrogation findings and agree with findings as stated.

## 2019-01-03 NOTE — PROGRESS NOTES
"Date of Implant with Heartmate II LVAD: 3/8/18    PATIENT ARRIVED IN CLINIC:  Ambulatory   Accompanied by:     Vitals  Doppler: 90/0  PAIN: 0 on 0-10 pain scale, location of pain: 0, description of pain: 0  Is patient currently on medications for pain? no What kind?     VAD Interrogation:  TXP SACHI INTERROGATIONS 1/3/2019   Type HeartMate3   Flow 6.7   Speed 9600   PI 4.2   Power (Jackson) 6.9   LSL 9200   Pulsatility -       Flow in history:   Waveforms: [id number].log  History Log:     Problems / Issues / Alarms with VAD if any: None noted  Any Equipment Issues: None noted (Refer to  note for complete details)   Emergency Equipment With Patient: Yes    VAD Binder With Patient: yes   Reviewed VAD Numbers In Binder: yes  VAD Sounds: Smooth  Manual & Visual Inspection Of Driveline: No kinks or tears noted     LVAD Dressing/DLES:  Appearance Of Driveline: "1"  Antibiotics: NO  Frequency of Dressing Changes: daily & soap and water dressing  Stabilization Device In Use: yes, sun securement device     Pt In Need Of Management Kits? No  It is medically necessary to have VAD management kits in order to prevent infection or to assist in the healing of an infected DLES.    Assessment:   Complaints/reason for visit today:routine  Complaints Of Nausea / Vomiting: None noted  Appearance and Frequency Of Stools: normal and formed without blood & daily  Color Of Urine: clear/yellow  Coping/Depression/Anxiety: coping okay  Sleep Habits: 5 hrs /night  Sleep Aids: None noted  Showering: No     Activity/Exercise: Walking   Driving: Yes. Reminded to pull over should there be an alarm before looking down at controller.    Labs:    Chemistry        Component Value Date/Time     01/03/2019 1205    K 3.9 01/03/2019 1205    CL 97 (L) 01/03/2019 1205    CO2 29 01/03/2019 1205    BUN 24 (H) 01/03/2019 1205    CREATININE 1.9 (H) 01/03/2019 1205    GLU 92 01/03/2019 1205        Component Value Date/Time    CALCIUM 9.6 " "01/03/2019 1205    ALKPHOS 89 01/03/2019 1205    AST 31 01/03/2019 1205    ALT 18 01/03/2019 1205    BILITOT 0.6 01/03/2019 1205    ESTGFRAFRICA 45.8 (A) 01/03/2019 1205    EGFRNONAA 39.7 (A) 01/03/2019 1205            Magnesium   Date Value Ref Range Status   01/03/2019 2.4 1.6 - 2.6 mg/dL Final       Lab Results   Component Value Date    WBC 4.80 01/03/2019    HGB 14.0 01/03/2019    HCT 43.8 01/03/2019    MCV 85 01/03/2019     01/03/2019       Lab Results   Component Value Date    INR 2.3 (H) 01/03/2019    INR 2.5 (H) 12/17/2018    INR 2.2 (H) 12/13/2018       BNP   Date Value Ref Range Status   01/03/2019 334 (H) 0 - 99 pg/mL Final     Comment:     Values of less than 100 pg/ml are consistent with non-CHF populations.   12/06/2018 292 (H) 0 - 99 pg/mL Final     Comment:     Values of less than 100 pg/ml are consistent with non-CHF populations.   11/21/2018 335 (H) 0 - 99 pg/mL Final     Comment:     Values of less than 100 pg/ml are consistent with non-CHF populations.       LD   Date Value Ref Range Status   01/03/2019 234 (H) 84 - 195 U/L Final     Comment:     Results are increased in hemolyzed samples.   12/06/2018 279 (H) 110 - 260 U/L Final     Comment:     Results are increased in hemolyzed samples.   11/07/2018 243 110 - 260 U/L Final     Comment:     Results are increased in hemolyzed samples.       Labs reviewed with patient: YES     Patient Satisfaction Survey Completed by Patient: no  (explained about signature and box to check)  Medication reconciliation: per MA.  Purple card updated today: yes  Coumadin Managed by: [facility]    Education: Reviewed driveline care, emergency procedures, how to change the controller, alarms with patient, as well as discussed how to page the VAD coordinator in case of an emergency.      Plans/Needs:  RTC 1 month; patient doing well. Cardura is out of stock, sent a message to pharm assistance program. Per Dr. Lopez note, " Reviewed echo today and " "LV remains severely enlarged, RV is small. Would like to increase speed again however BP uncontrolled today after pt ran out of cardura.  Asked him to pick it up today from our pharmacy and come back for doppler check next week- if better increase to 9800 rpm"    Hurricane Season: No    "

## 2019-01-03 NOTE — PATIENT INSTRUCTIONS
Go directly to the pharmacy in the lobby and  your bp medicine- start doxazosin tonight!!!      Blood pressure check next week with you taking all your medicines and if your pressure is better we will increase your speed that day    Keep salt intake to under 2000 mg sodium, fluids to under 2 L (64 oz)    Call us if you find yourself getting more short of breath, have more swelling or unexpected weight changes, fever, chills, alarms, bloody or black bowel movements, or drainage from your driveline

## 2019-01-03 NOTE — PROCEDURES
TXP SACHI INTERROGATIONS 1/3/2019 12/6/2018 11/7/2018 9/27/2018 8/9/2018 7/13/2018 6/16/2018   Type HeartMate3 HeartMate II HeartMate II HeartMate II HeartMate II HeartMate II -   Flow 6.7 6.5 .6.5 6.9 6.3 6.9 -   Speed 9600 9600 9400 9400 9400 9200 -   PI 4.2 4.5 3.5 4.0 3.5 4.4 -   Power (Jackson) 6.9 6.7 6.5 6.7 6.4 6.5 -   LSL 9200 9200 9000 9000 9000 8800 -   Pulsatility - Pulse No Pulse No Pulse No Pulse Pulse Intermittent pulse     Interrogation of Ventricular assist device was performed with physician analysis of device parameters and review of device function. I have personally reviewed the interrogation findings and agree with findings as stated.

## 2019-01-03 NOTE — PROGRESS NOTES
"Subjective:   Patient ID:  Tim Richards is a 53 y.o. male who presents for LVAD followup visit.    Implant Date: 3/8/2018  Initials:RBB     Heartmate II RPM 9400  3/9/18 outflow graft changed     INR goal: 2-3   Bridge with Heparin   Antiplatelets: , prevent trial      TXP SACHI INTERROGATIONS 12/6/2018   Type HeartMate II   Flow 6.5   Speed 9600   PI 4.5   Power (Jackson) 6.7   LSL 9200   Pulsatility Pulse       HPI  52 yo BM with DCM, HBP, CHF stage D s/p HM 2 comes for routine visit.  Patient denied any major symptoms. Patient with stable exertional symptoms meeting NYHA class II qualities. Patient is been compliant with medical therapy but his Cardura prescription was not filled by his pharmacy because of not availability on the pharmacy for which the patient was not using the med. DLES is a "1".No major VAD. Alarms. INR is therapeutic and cell count, LDH and renal function are within patient baseline. Patient is nonpulsatile today, Doppler: 102 mmHg, rechecked later at 90 mmHg. Patient was not taking Cardura, prescription sent to [facility] for immediate  today.    Echocardiogram (01/03/2018):    · LVAD present. Base speed is 9600. The pump type is a Heartmate II.  · The interventricular septum appears midline. The aortic valve does not open.  · Decreased left ventricular systolic function. The estimated ejection fraction is 10%  · Eccentric left ventricular hypertrophy.  · Biatrial enlargement.  · Grade II (moderate) left ventricular diastolic dysfunction consistent with pseudonormalization.  · Elevated left atrial pressure.  · Moderate mitral regurgitation.  · Elevated central venous pressure (15 mm Hg).      Review of Systems   Constitution: Negative for chills, decreased appetite, diaphoresis, fever and weakness.   HENT: Negative for congestion.    Cardiovascular: Positive for dyspnea on exertion (Stable NYHA class II). Negative for chest pain, claudication, cyanosis, irregular " "heartbeat, leg swelling and orthopnea.   Respiratory: Negative for hemoptysis and shortness of breath.    Gastrointestinal: Negative for bloating, abdominal pain, hematemesis and hematochezia.       Objective:   Blood pressure (!) 102/0, temperature 98.6 °F (37 °C), temperature source Oral, height 6' 1" (1.854 m), weight 112.5 kg (248 lb).body mass index is 32.72 kg/m².    Doppler: 102 mmHg, rechecked later at 90 mmHg.    Physical Exam   Constitutional: He is oriented to person, place, and time. He appears well-developed.   HENT:   Head: Normocephalic.   Eyes: Pupils are equal, round, and reactive to light.   Neck: Normal range of motion. No JVD present.   Cardiovascular:   Adequate VAD smooth sound.   Pulmonary/Chest: Effort normal. No respiratory distress. He has no wheezes. He has no rales.   Abdominal: Soft. Bowel sounds are normal. He exhibits no distension. There is no tenderness. There is no rebound and no guarding.   Musculoskeletal: Normal range of motion. He exhibits no edema.   Neurological: He is alert and oriented to person, place, and time. He has normal reflexes. No cranial nerve deficit.   Skin: Skin is warm and dry. No erythema.       Lab Results   Component Value Date    WBC 4.80 01/03/2019    HGB 14.0 01/03/2019    HCT 43.8 01/03/2019    MCV 85 01/03/2019     01/03/2019    CO2 29 01/03/2019    CREATININE 1.9 (H) 01/03/2019    CALCIUM 9.6 01/03/2019    ALBUMIN 4.8 01/03/2019    AST 31 01/03/2019     (H) 01/03/2019    ALT 18 01/03/2019     (H) 01/03/2019       Lab Results   Component Value Date    INR 2.3 (H) 01/03/2019    INR 2.5 (H) 12/17/2018    INR 2.2 (H) 12/13/2018       BNP   Date Value Ref Range Status   01/03/2019 334 (H) 0 - 99 pg/mL Final     Comment:     Values of less than 100 pg/ml are consistent with non-CHF populations.   12/06/2018 292 (H) 0 - 99 pg/mL Final     Comment:     Values of less than 100 pg/ml are consistent with non-CHF populations.   11/21/2018 335 " (H) 0 - 99 pg/mL Final     Comment:     Values of less than 100 pg/ml are consistent with non-CHF populations.       LD   Date Value Ref Range Status   01/03/2019 234 (H) 84 - 195 U/L Final     Comment:     Results are increased in hemolyzed samples.   12/06/2018 279 (H) 110 - 260 U/L Final     Comment:     Results are increased in hemolyzed samples.   11/07/2018 243 110 - 260 U/L Final     Comment:     Results are increased in hemolyzed samples.       Labs were reviewed with the patient.    No results found for this or any previous visit.  No results found for this or any previous visit.    Assessment:      1. LVAD (left ventricular assist device) present    2. Hypertension, unspecified type    3. Dilated cardiomyopathy        Plan:   -Elevated BP noted today but patient was not using Cardura, new prescription ordered for today and to be picked up by patient at [facility].  -Will schedule for BP recheck in 1 week, if adequate will increase VAD speed.  -Recommend 2 gram sodium restriction and 1500cc fluid restriction.  -Encourage physical activity with graded exercise program.  -Requested patient to weigh themselves daily, and to notify us if their weight increases by more than 3 lbs in 1 day or 5 lbs in 1 week.  -Patient currently with stable exertional symptoms falling on NYHA class II.  -Case discussed with attending physician Dr.Mandras. MOYA Patient Status  Functional Status: 80% - Normal activity with effort: some symptoms of disease  Physical Capacity: No Limitations  Working for Income: yes  If yes, working activity level: Working Full Time    Listed for transplant: No needs to be smoke free for six months starting in March 2018 Will check at next visit in two week   Meet with peter from pre today to gather info / answer questions

## 2019-01-04 ENCOUNTER — TELEPHONE (OUTPATIENT)
Dept: PHARMACY | Facility: CLINIC | Age: 54
End: 2019-01-04

## 2019-01-04 ENCOUNTER — ANTI-COAG VISIT (OUTPATIENT)
Dept: CARDIOLOGY | Facility: CLINIC | Age: 54
End: 2019-01-04

## 2019-01-04 DIAGNOSIS — Z79.01 LONG TERM (CURRENT) USE OF ANTICOAGULANTS: ICD-10-CM

## 2019-01-04 DIAGNOSIS — Z95.811 LVAD (LEFT VENTRICULAR ASSIST DEVICE) PRESENT: ICD-10-CM

## 2019-01-04 RX ORDER — HYDRALAZINE HYDROCHLORIDE 100 MG/1
100 TABLET, FILM COATED ORAL EVERY 8 HOURS
Qty: 90 TABLET | Refills: 6 | Status: SHIPPED | OUTPATIENT
Start: 2019-01-04 | End: 2019-07-13 | Stop reason: SDUPTHER

## 2019-01-04 NOTE — PROGRESS NOTES
Patient wife ricardo advised of dose instructions and verbalized understanding.  Will recheck  1/  10/19   .

## 2019-01-10 ENCOUNTER — TELEPHONE (OUTPATIENT)
Dept: TRANSPLANT | Facility: CLINIC | Age: 54
End: 2019-01-10

## 2019-01-10 RX ORDER — DOXAZOSIN 4 MG/1
4 TABLET ORAL 2 TIMES DAILY
Qty: 60 TABLET | Refills: 11 | Status: SHIPPED | OUTPATIENT
Start: 2019-01-10 | End: 2019-01-18 | Stop reason: DRUGHIGH

## 2019-01-10 NOTE — TELEPHONE ENCOUNTER
Received phone call from caregiver, Kelly. Pt was prescribed Cardura 8mg XL on 1/3/19. Pt did not  due to cost ($60) and did not call anyone in clinic. Pt scheduled for a BP check today and is not coming due to the fact that he never picked up his Cardura. Wife is asking what is the alternative?    Reviewed with Dr. Rhodes- change to Cardura 4mg BID. Pt to return for BP check, labs and SW visit in 1 week.   Reviewed with Kelly via p/c.

## 2019-01-18 ENCOUNTER — ANTI-COAG VISIT (OUTPATIENT)
Dept: CARDIOLOGY | Facility: CLINIC | Age: 54
End: 2019-01-18

## 2019-01-18 ENCOUNTER — CLINICAL SUPPORT (OUTPATIENT)
Dept: TRANSPLANT | Facility: CLINIC | Age: 54
End: 2019-01-18
Payer: COMMERCIAL

## 2019-01-18 VITALS — SYSTOLIC BLOOD PRESSURE: 88 MMHG | HEART RATE: 80 BPM

## 2019-01-18 DIAGNOSIS — Z79.01 LONG TERM (CURRENT) USE OF ANTICOAGULANTS: ICD-10-CM

## 2019-01-18 DIAGNOSIS — Z95.811 LVAD (LEFT VENTRICULAR ASSIST DEVICE) PRESENT: ICD-10-CM

## 2019-01-18 PROCEDURE — 99999 PR PBB SHADOW E&M-EST. PATIENT-LVL II: ICD-10-PCS | Mod: PBBFAC,,,

## 2019-01-18 PROCEDURE — 99999 PR PBB SHADOW E&M-EST. PATIENT-LVL II: CPT | Mod: PBBFAC,,,

## 2019-01-18 NOTE — PROGRESS NOTES
Will need to start lovenox 60mg every 12hr. Spoke with wife, she reports lovenox at home.  Possibly missed more than 1 dose. Will boost dose. INR 1/21.

## 2019-01-18 NOTE — PROGRESS NOTES
1/17/18 1500:  Pt has not shown up for his 1430 lab appt yet.  Called his phone, no answer.  Called his wife.  She said she will try to get in touch with him.    Pt called back to change his appt to tomorrow.

## 2019-01-18 NOTE — PROGRESS NOTES
Pt reports eating broccoli stir merino last night 1/17/19..  Spoke with patient wife who reports he has missed one dose she is currently not home to verify which day .  Also confirmed correct dose and denies any changes

## 2019-01-18 NOTE — PROGRESS NOTES
Reviewed B/P with Dr. Rhodes: increase Cardura to 8mg BID. F/U 1 month with full clinic visit. Educated pt to take B/P at home. Call/page with symptoms of dizziness, lightheadedness, etc in the event of HoTN. Pt verbalizes understanding.

## 2019-01-19 RX ORDER — DOXAZOSIN 8 MG/1
8 TABLET ORAL EVERY 12 HOURS
Qty: 60 TABLET | Refills: 11 | Status: ON HOLD | OUTPATIENT
Start: 2019-01-19 | End: 2019-07-23 | Stop reason: HOSPADM

## 2019-01-21 ENCOUNTER — ANTI-COAG VISIT (OUTPATIENT)
Dept: CARDIOLOGY | Facility: CLINIC | Age: 54
End: 2019-01-21

## 2019-01-21 ENCOUNTER — LAB VISIT (OUTPATIENT)
Dept: LAB | Facility: HOSPITAL | Age: 54
End: 2019-01-21
Attending: INTERNAL MEDICINE
Payer: COMMERCIAL

## 2019-01-21 DIAGNOSIS — Z95.811 HEART REPLACED BY HEART ASSIST DEVICE: ICD-10-CM

## 2019-01-21 DIAGNOSIS — Z95.811 LVAD (LEFT VENTRICULAR ASSIST DEVICE) PRESENT: ICD-10-CM

## 2019-01-21 DIAGNOSIS — Z79.01 LONG TERM (CURRENT) USE OF ANTICOAGULANTS: ICD-10-CM

## 2019-01-21 DIAGNOSIS — I50.9 HEART FAILURE: ICD-10-CM

## 2019-01-21 LAB
INR PPP: 2.1
PROTHROMBIN TIME: 22.4 SEC

## 2019-01-21 PROCEDURE — 85610 PROTHROMBIN TIME: CPT

## 2019-01-21 PROCEDURE — 36415 COLL VENOUS BLD VENIPUNCTURE: CPT

## 2019-01-21 NOTE — PROGRESS NOTES
Patient wife advised of dose instructions d/c lovenox and verbalized understanding. Recheck 1/24/19

## 2019-01-25 ENCOUNTER — PATIENT MESSAGE (OUTPATIENT)
Dept: CARDIOLOGY | Facility: CLINIC | Age: 54
End: 2019-01-25

## 2019-02-19 ENCOUNTER — TELEPHONE (OUTPATIENT)
Dept: RESEARCH | Facility: HOSPITAL | Age: 54
End: 2019-02-19

## 2019-02-19 NOTE — TELEPHONE ENCOUNTER
Called patient to remind him of appts tomorrow 2/20/19. Spoke with patient's wife, Kelly, and informed her that this would be the last visit for the Prevent II trial. Asked to have patient bring in study medication to appt tomorrow. Wife verbalized understanding.

## 2019-02-20 ENCOUNTER — PATIENT MESSAGE (OUTPATIENT)
Dept: TRANSPLANT | Facility: CLINIC | Age: 54
End: 2019-02-20

## 2019-02-20 ENCOUNTER — ANTI-COAG VISIT (OUTPATIENT)
Dept: CARDIOLOGY | Facility: CLINIC | Age: 54
End: 2019-02-20

## 2019-02-20 DIAGNOSIS — Z79.01 LONG TERM (CURRENT) USE OF ANTICOAGULANTS: ICD-10-CM

## 2019-02-20 DIAGNOSIS — Z95.811 LVAD (LEFT VENTRICULAR ASSIST DEVICE) PRESENT: ICD-10-CM

## 2019-02-20 NOTE — PROGRESS NOTES
Patient wife ricardo advised of dose instructions 7.5mg Mo/We/Fr & 5mg all other days -- continue and verbalized understanding. Reported no changes   Will recheck   2/ 27 /19   .

## 2019-02-21 ENCOUNTER — RESEARCH ENCOUNTER (OUTPATIENT)
Dept: RESEARCH | Facility: HOSPITAL | Age: 54
End: 2019-02-21

## 2019-02-21 ENCOUNTER — CLINICAL SUPPORT (OUTPATIENT)
Dept: TRANSPLANT | Facility: CLINIC | Age: 54
End: 2019-02-21
Payer: COMMERCIAL

## 2019-02-21 ENCOUNTER — OFFICE VISIT (OUTPATIENT)
Dept: TRANSPLANT | Facility: CLINIC | Age: 54
End: 2019-02-21
Attending: INTERNAL MEDICINE
Payer: COMMERCIAL

## 2019-02-21 VITALS — SYSTOLIC BLOOD PRESSURE: 80 MMHG | TEMPERATURE: 99 F | BODY MASS INDEX: 34.59 KG/M2 | WEIGHT: 261 LBS | HEIGHT: 73 IN

## 2019-02-21 DIAGNOSIS — I50.42 CHRONIC COMBINED SYSTOLIC AND DIASTOLIC HEART FAILURE: ICD-10-CM

## 2019-02-21 PROCEDURE — 99214 PR OFFICE/OUTPT VISIT, EST, LEVL IV, 30-39 MIN: ICD-10-PCS | Mod: S$GLB,,, | Performed by: INTERNAL MEDICINE

## 2019-02-21 PROCEDURE — 99214 OFFICE O/P EST MOD 30 MIN: CPT | Mod: S$GLB,,, | Performed by: INTERNAL MEDICINE

## 2019-02-21 PROCEDURE — 99999 PR PBB SHADOW E&M-EST. PATIENT-LVL III: CPT | Mod: PBBFAC,,, | Performed by: INTERNAL MEDICINE

## 2019-02-21 PROCEDURE — 99999 PR PBB SHADOW E&M-EST. PATIENT-LVL III: ICD-10-PCS | Mod: PBBFAC,,, | Performed by: INTERNAL MEDICINE

## 2019-02-21 RX ORDER — ASPIRIN 81 MG/1
81 TABLET ORAL DAILY
Refills: 0 | Status: ON HOLD | COMMUNITY
Start: 2019-02-21 | End: 2019-07-23 | Stop reason: HOSPADM

## 2019-02-21 RX ORDER — SILDENAFIL 50 MG/1
50 TABLET, FILM COATED ORAL DAILY PRN
Qty: 10 TABLET | Refills: 1 | Status: SHIPPED | OUTPATIENT
Start: 2019-02-21 | End: 2019-03-21

## 2019-02-21 NOTE — PROGRESS NOTES
Study: Prevent II 2016.270.A  Sponsor: [employer]  Follow-up Visit: 1 year  Date of Visit: [id number]    Patient wishes to continue in study: Yes  All study protocol required CRFs completed: Yes    Mr. Richards is here for the 1 year follow up visit. Current list of medications obtained and verified; he denies any hospitalizations, ED visits, or any other adverse events since previous study follow-up. All questions answered to his satisfaction and he will contact research staff if he has any questions or concerns. Study completed. Patient returned study bottle #[id number] with 35 pills remaining. Study drug will be returned under [employer] tracking # [id number].     The following tests and procedures are related to research study:  Labs, VAD clinic visit

## 2019-02-21 NOTE — LETTER
March 4, 2019        Nader Chiu  [address]  [address]  [place] [address]  Phone: [phone]  Fax: [phone]             [facility] [address] [place] [address]  Phone: [phone]   Patient: Tim Richards   MR Number: [id number]   YOB: 1965   Date of Visit: 2/21/2019       Dear Dr. Nader Chiu    Thank you for referring Tim Richards to me for evaluation. Attached you will find relevant portions of my assessment and plan of care.    If you have questions, please do not hesitate to call me. I look forward to following Tim Richards along with you.    Sincerely,    Amy Rhodes MD    Enclosure    If you would like to receive this communication electronically, please contact [email or web address] or [phone] to request [employer] access.    [employer] is a tool which provides read-only access to select patient information with whom you have a relationship. Its easy to use and provides real time access to review your patients record including encounter summaries, notes, results, and demographic information.    If you feel you have received this communication in error or would no longer like to receive these types of communications, please e-mail [email or web address]

## 2019-02-22 RX ORDER — FERROUS GLUCONATE 324(38)MG
324 TABLET ORAL
Qty: 90 TABLET | Refills: 6 | Status: SHIPPED | OUTPATIENT
Start: 2019-02-22 | End: 2020-04-22

## 2019-02-22 RX ORDER — LANOLIN ALCOHOL/MO/W.PET/CERES
400 CREAM (GRAM) TOPICAL 3 TIMES DAILY
Qty: 90 TABLET | Refills: 6 | Status: SHIPPED | OUTPATIENT
Start: 2019-02-22 | End: 2019-07-13 | Stop reason: SDUPTHER

## 2019-02-22 NOTE — PROGRESS NOTES
"Date of Implant with Heartmate II LVAD: 3/8/18    PATIENT ARRIVED IN CLINIC:  Ambulatory   Accompanied by:     Vitals  Doppler: 80/0  PAIN: 0 on 0-10 pain scale, location of pain: 0, description of pain:  Is patient currently on medications for pain? no What kind?    VAD Interrogation:  TXP [facility] INTERROGATIONS 2/21/2019   Type HeartMate II   Flow 6.8   Speed 9600   PI 3.6   Power ([place]) 7.0   LSL 9200   Pulsatility No Pulse       Flow in history:   Waveforms: [id number]  History Log:     Problems / Issues / Alarms with VAD if any: None noted  Any Equipment Issues: None noted (Refer to  note for complete details)   Emergency Equipment With Patient: Yes    VAD Binder With Patient: yes   Reviewed VAD Numbers In Binder: yes  VAD Sounds: Smooth  Manual & Visual Inspection Of Driveline: No kinks or tears noted     LVAD Dressing/DLES:  Appearance Of Driveline: "1"  Antibiotics: NO  Frequency of Dressing Changes: every other day & soap and water dressing  Stabilization Device In Use: yes, sun securement device     Pt In Need Of Management Kits? Yes - 1 box of soap and water  It is medically necessary to have VAD management kits in order to prevent infection or to assist in the healing of an infected DLES.    Assessment:   Complaints/reason for visit today:routine  Complaints Of Nausea / Vomiting: None noted  Appearance and Frequency Of Stools: normal and formed without blood & daily  Color Of Urine: clear/yellow  Coping/Depression/Anxiety: coping okay  Sleep Habits: 8 hrs /night  Sleep Aids: None noted  Showering: Yes, reminded to change dressing immediately after drying off     Activity/Exercise: Walking   Driving: Yes. Reminded to pull over should there be an alarm before looking down at controller.    Labs:    Chemistry        Component Value Date/Time     (L) 02/20/2019 1207    K 3.9 02/20/2019 1207    CL 97 (L) 02/20/2019 1207    CO2 26 02/20/2019 1207    BUN 25 (H) 02/20/2019 1207    " CREATININE 2.0 (H) 02/20/2019 1207    GLU 84 02/20/2019 1207        Component Value Date/Time    CALCIUM 9.5 02/20/2019 1207    ALKPHOS 83 02/20/2019 1207    AST 31 02/20/2019 1207    ALT 22 02/20/2019 1207    BILITOT 1.0 02/20/2019 1207    ESTGFRAFRICA 43.1 (A) 02/20/2019 1207    EGFRNONAA 37.3 (A) 02/20/2019 1207            Magnesium   Date Value Ref Range Status   02/20/2019 2.3 1.6 - 2.6 mg/dL Final       Lab Results   Component Value Date    WBC 4.60 02/20/2019    HGB 14.2 02/20/2019    HCT 44.0 02/20/2019    MCV 86 02/20/2019     02/20/2019       Lab Results   Component Value Date    INR 2.6 (H) 02/20/2019    INR 2.1 (H) 01/21/2019    INR 1.4 (H) 01/18/2019       BNP   Date Value Ref Range Status   02/20/2019 433 (H) 0 - 99 pg/mL Final     Comment:     Values of less than 100 pg/ml are consistent with non-CHF populations.   01/18/2019 430 (H) 0 - 99 pg/mL Final     Comment:     Values of less than 100 pg/ml are consistent with non-CHF populations.   01/18/2019 430 (H) 0 - 99 pg/mL Final     Comment:     Values of less than 100 pg/ml are consistent with non-CHF populations.       LD   Date Value Ref Range Status   02/20/2019 249 (H) 84 - 195 U/L Final     Comment:     Results are increased in hemolyzed samples.   01/18/2019 209 (H) 84 - 195 U/L Final     Comment:     Results are increased in hemolyzed samples.   01/03/2019 234 (H) 84 - 195 U/L Final     Comment:     Results are increased in hemolyzed samples.       Labs reviewed with patient: YES     Patient Satisfaction Survey Completed by Patient: yes  (explained about signature and box to check)  Medication reconciliation: per MA.  Purple card updated today: yes  Coumadin Managed by: [facility]    Education: Reviewed driveline care, emergency procedures, how to change the controller, alarms with patient, as well as discussed how to page the VAD coordinator in case of an emergency.      Plans/Needs: RTC in 1 month. Speed increased per   Meagan silva. Started on Viagra.      Hurricane Season: No

## 2019-02-27 ENCOUNTER — ANTI-COAG VISIT (OUTPATIENT)
Dept: CARDIOLOGY | Facility: CLINIC | Age: 54
End: 2019-02-27

## 2019-02-27 DIAGNOSIS — Z95.811 LVAD (LEFT VENTRICULAR ASSIST DEVICE) PRESENT: ICD-10-CM

## 2019-02-27 DIAGNOSIS — Z79.01 LONG TERM (CURRENT) USE OF ANTICOAGULANTS: ICD-10-CM

## 2019-03-01 ENCOUNTER — ANTI-COAG VISIT (OUTPATIENT)
Dept: CARDIOLOGY | Facility: CLINIC | Age: 54
End: 2019-03-01

## 2019-03-01 DIAGNOSIS — Z79.01 LONG TERM (CURRENT) USE OF ANTICOAGULANTS: ICD-10-CM

## 2019-03-01 DIAGNOSIS — Z95.811 LVAD (LEFT VENTRICULAR ASSIST DEVICE) PRESENT: ICD-10-CM

## 2019-03-05 NOTE — PROGRESS NOTES
Subjective:   Patient ID:  Tim Richards is a 53 y.o. male who presents for LVAD followup visit.    Implant Date: 3/8/2018  Initials:RBB     Heartmate II RPM 9400  3/9/18 outflow graft changed     INR goal: 2-3   Bridge with Heparin   Antiplatelets: , prevent trial    TXP [facility] INTERROGATIONS 2/21/2019   Type HeartMate II   Flow 6.8   Speed 9600   PI 3.6   Power (Jackson) 7.0   LSL 9200   Pulsatility No Pulse       HPI  52 yo BM with DCM, HBP, CHF stage D s/p HM 2 comes for routine visit.  Patient is here for routine clinic visit including blood presure management. He has gained signifciant weight, additionally is looking for medication for erectile dysfunction. Feels well overall, denies cardiopulmonary complaints, is working quite a bit. Was late yesterday and we could not see him, counseled him on coming to appointments on time and following instructions. Patient denies N/V/F/C, lightheadedness, dizziness, PND, orthopnea, LE edema, abdominal pain, abdominal pressure, chest pain, chest pressure, syncope, pre-syncope. Additionally patient has no bleeding, no bright red blood per rectum, melena, no GI symptoms at all. NYHA FC II.     12/06/18 TTE:  Speed 9600rpm  AV not opening, septum midline, LVEF 10%, mod to severe RV systolic reduced function, PASP 33.4mm Hg, mild MR, trace TR, intermediate CVP.    Review of Systems   Constitution: Positive for weight gain (gained weight from last time again). Negative for chills, decreased appetite, diaphoresis, fever, weakness, malaise/fatigue (improved with increased speed), night sweats and weight loss.   HENT: Negative for congestion, hearing loss, hoarse voice, nosebleeds and sore throat.    Eyes: Negative for blurred vision, double vision, pain, vision loss in left eye, vision loss in right eye and visual disturbance.   Cardiovascular: Positive for dyspnea on exertion (improved/stable). Negative for chest pain, claudication, irregular heartbeat, leg swelling,  "near-syncope, orthopnea, palpitations, paroxysmal nocturnal dyspnea and syncope.   Respiratory: Negative for cough, hemoptysis, shortness of breath, sleep disturbances due to breathing, snoring, sputum production and wheezing.    Endocrine: Negative for cold intolerance, heat intolerance, polydipsia and polyuria.   Hematologic/Lymphatic: Negative for bleeding problem. Does not bruise/bleed easily.   Skin: Negative for poor wound healing and rash.   Musculoskeletal: Negative for arthritis, falls, joint pain, muscle cramps, muscle weakness, myalgias and neck pain.   Gastrointestinal: Negative for bloating, abdominal pain, anorexia, change in bowel habit, constipation, diarrhea, hematemesis, hematochezia, jaundice, melena, nausea and vomiting.   Genitourinary: Negative for bladder incontinence, dysuria, frequency, hematuria, hesitancy, incomplete emptying and urgency.   Neurological: Negative for brief paralysis, difficulty with concentration, excessive daytime sleepiness, dizziness, focal weakness, headaches, light-headedness, numbness, paresthesias and seizures.   Psychiatric/Behavioral: Negative for depression and memory loss. The patient does not have insomnia and is not nervous/anxious.      Objective:     Physical Exam   Constitutional: He appears well-developed and well-nourished. No distress.   BP (!) 80/0 (BP Location: Left arm, Patient Position: Sitting, BP Method: Large (Manual)) Comment: doppler  Temp 99 °F (37.2 °C) (Oral)   Ht 6' 1" (1.854 m)   Wt 118.4 kg (261 lb)   BMI 34.43 kg/m²      HENT:   Head: Normocephalic and atraumatic.   Eyes: Conjunctivae are normal. No scleral icterus.   Neck: No JVD (at clavicle at 90 degrees) present. No thyromegaly present.   Cardiovascular:   Normal VAD sounds; DL 1   Pulmonary/Chest: Effort normal and breath sounds normal. No respiratory distress. He has no wheezes. He has no rales.   Abdominal: Soft. Bowel sounds are normal. He exhibits no distension and no mass. " There is no tenderness. There is no rebound and no guarding.   Musculoskeletal: He exhibits no edema or tenderness.   Neurological: He is alert.   Skin: Skin is warm and dry. He is not diaphoretic.   Psychiatric: He has a normal mood and affect. His behavior is normal. Judgment and thought content normal.   Nursing note and vitals reviewed.    Lab Results   Component Value Date     (H) 02/27/2019     (L) 02/27/2019    K 4.0 02/27/2019    MG 2.2 02/27/2019     (L) 02/27/2019    CO2 26 02/27/2019    BUN 25 (H) 02/27/2019    CREATININE 1.9 (H) 02/27/2019    GLU 89 02/27/2019    HGBA1C 5.5 03/08/2018    AST 31 02/27/2019    ALT 19 02/27/2019    ALBUMIN 4.7 02/27/2019    PROT 8.5 (H) 02/27/2019    BILITOT 0.6 02/27/2019    CHOL 150 11/07/2018    HDL 85 (H) 11/07/2018    LDLCALC 53.0 (L) 11/07/2018    TRIG 60 11/07/2018       Magnesium   Date Value Ref Range Status   02/27/2019 2.2 1.6 - 2.6 mg/dL Final       Lab Results   Component Value Date    WBC 4.60 02/27/2019    HGB 13.4 (L) 02/27/2019    HCT 41.5 02/27/2019    MCV 85 02/27/2019     02/27/2019       Lab Results   Component Value Date    INR 3.0 (H) 03/01/2019    INR 3.1 (H) 02/27/2019    INR 2.6 (H) 02/20/2019       BNP   Date Value Ref Range Status   02/27/2019 344 (H) 0 - 99 pg/mL Final     Comment:     Values of less than 100 pg/ml are consistent with non-CHF populations.   02/20/2019 433 (H) 0 - 99 pg/mL Final     Comment:     Values of less than 100 pg/ml are consistent with non-CHF populations.   01/18/2019 430 (H) 0 - 99 pg/mL Final     Comment:     Values of less than 100 pg/ml are consistent with non-CHF populations.   01/18/2019 430 (H) 0 - 99 pg/mL Final     Comment:     Values of less than 100 pg/ml are consistent with non-CHF populations.       LD   Date Value Ref Range Status   02/27/2019 252 (H) 84 - 195 U/L Final     Comment:     Results are increased in hemolyzed samples.   02/20/2019 249 (H) 84 - 195 U/L Final      Comment:     Results are increased in hemolyzed samples.   01/18/2019 209 (H) 84 - 195 U/L Final     Comment:     Results are increased in hemolyzed samples.       Assessment:      1. LVAD (left ventricular assist device) present    2. Examination of participant in clinical trial    3. Chronic systolic heart failure    4. High risk medications (amiodarone) long-term use    5. PVT (paroxysmal ventricular tachycardia)    6. Biventricular implantable cardioverter-defibrillator in situ    7. Dilated cardiomyopathy    8. CKD (chronic kidney disease), stage III    9. Hypertensive cardiovascular-renal disease, stage 1-4 or unspecified chronic kidney disease, with heart failure      Plan:     counselled heavily on weight loss, patient gained weight again. Not exercising as much. Recommend against discussion of OHT again at this time,not sure if we checked nicotine/coteniene again since last visit a couple of months ago. Went ahead and gave prescription for sildenafil.   Additionally patient is no longer getting study drug for aspirin, will have him start taking aspirine 81mg po daily. Blood pressure is stable today, which is a good change to see.      Recommend 2 gram sodium restriction and 1500cc fluid restriction.  Encourage physical activity with graded exercise program.  Requested patient to weigh themselves daily, and to notify us if their weight increases by more than 3 lbs in 1 day or 5 lbs in 1 week.   NYHA FC II.       UNOS Patient Status  Functional Status: 90% - Normal activity with effort minimal symptoms of disease  Physical Capacity: No Limitations  Working for Income: yes  If yes, working activity level: Working Full Time    Listed for transplant: RUPALI

## 2019-03-05 NOTE — PROCEDURES
TXP [facility] INTERROGATIONS 2/21/2019 1/3/2019 12/6/2018 11/7/2018 9/27/2018 8/9/2018 7/13/2018   Type HeartMate II HeartMate3 HeartMate II HeartMate II HeartMate II HeartMate II HeartMate II   Flow 6.8 6.7 6.5 .6.5 6.9 6.3 6.9   Speed 9600 9600 9600 9400 9400 9400 9200   PI 3.6 4.2 4.5 3.5 4.0 3.5 4.4   Power (Jackson) 7.0 6.9 6.7 6.5 6.7 6.4 6.5   LSL 9200 9200 9200 9000 9000 9000 8800   Pulsatility No Pulse - Pulse No Pulse No Pulse No Pulse Pulse   }

## 2019-03-13 NOTE — PROGRESS NOTES
Study: Prevent II  Sponsor: [employer]  Follow-up Visit: 1 month  Date of Visit: [id number]    Patient wishes to continue in study: Yes  All study protocol required CRFs completed: Yes    Mr. Richards is here for the 1 month follow up visit. Current list of medications obtained and verified; he denies any hospitalizations, ED visits, or any other adverse events since previous study follow-up. All questions answered to his satisfaction and he will contact research staff if he has any questions or concerns. Next follow up visit is in 2 months.    The following tests and procedures are related to research study:  VAD clinic visit, labs    [FreeTextEntry1] : Doing well overall.  each day stronger.   Saw urology recently who is pleased with progress - feels that it will take months to fully recover but slowly tapering meds and not cath.  denies other active s/s of lupus. on prednisone 55 mg and does not enlarge face and weight gain.  adherent to cellcept at 3 pills BID.  doesn’t feel the gabapentin makes a difference in symptoms and was wondering if could taper\par \par \par

## 2019-03-20 DIAGNOSIS — E79.0 HYPERURICEMIA: ICD-10-CM

## 2019-03-20 RX ORDER — ALLOPURINOL 100 MG/1
TABLET ORAL
Qty: 30 TABLET | Refills: 5 | Status: SHIPPED | OUTPATIENT
Start: 2019-03-20 | End: 2019-07-13 | Stop reason: SDUPTHER

## 2019-03-20 RX ORDER — PANTOPRAZOLE SODIUM 40 MG/1
40 TABLET, DELAYED RELEASE ORAL DAILY
Qty: 30 TABLET | Refills: 11 | Status: SHIPPED | OUTPATIENT
Start: 2019-03-20 | End: 2020-01-20

## 2019-03-21 ENCOUNTER — CLINICAL SUPPORT (OUTPATIENT)
Dept: TRANSPLANT | Facility: CLINIC | Age: 54
End: 2019-03-21
Payer: COMMERCIAL

## 2019-03-21 ENCOUNTER — ANTI-COAG VISIT (OUTPATIENT)
Dept: CARDIOLOGY | Facility: CLINIC | Age: 54
End: 2019-03-21

## 2019-03-21 ENCOUNTER — OFFICE VISIT (OUTPATIENT)
Dept: TRANSPLANT | Facility: CLINIC | Age: 54
End: 2019-03-21
Attending: INTERNAL MEDICINE
Payer: COMMERCIAL

## 2019-03-21 VITALS — HEIGHT: 73 IN | TEMPERATURE: 99 F | BODY MASS INDEX: 34.85 KG/M2 | SYSTOLIC BLOOD PRESSURE: 90 MMHG | WEIGHT: 263 LBS

## 2019-03-21 DIAGNOSIS — I50.42 CHRONIC COMBINED SYSTOLIC AND DIASTOLIC HEART FAILURE: ICD-10-CM

## 2019-03-21 DIAGNOSIS — Z01.818 PRE-TRANSPLANT EVALUATION FOR HEART TRANSPLANT: ICD-10-CM

## 2019-03-21 DIAGNOSIS — F17.210 CIGARETTE NICOTINE DEPENDENCE WITHOUT COMPLICATION: Primary | ICD-10-CM

## 2019-03-21 DIAGNOSIS — N18.30 CKD (CHRONIC KIDNEY DISEASE), STAGE III: ICD-10-CM

## 2019-03-21 DIAGNOSIS — Z79.01 LONG TERM (CURRENT) USE OF ANTICOAGULANTS: ICD-10-CM

## 2019-03-21 DIAGNOSIS — Z95.811 LVAD (LEFT VENTRICULAR ASSIST DEVICE) PRESENT: ICD-10-CM

## 2019-03-21 PROCEDURE — 99215 PR OFFICE/OUTPT VISIT, EST, LEVL V, 40-54 MIN: ICD-10-PCS | Mod: S$GLB,,, | Performed by: INTERNAL MEDICINE

## 2019-03-21 PROCEDURE — 99999 PR PBB SHADOW E&M-EST. PATIENT-LVL III: CPT | Mod: PBBFAC,,, | Performed by: INTERNAL MEDICINE

## 2019-03-21 PROCEDURE — 99215 OFFICE O/P EST HI 40 MIN: CPT | Mod: S$GLB,,, | Performed by: INTERNAL MEDICINE

## 2019-03-21 PROCEDURE — 99999 PR PBB SHADOW E&M-EST. PATIENT-LVL III: ICD-10-PCS | Mod: PBBFAC,,, | Performed by: INTERNAL MEDICINE

## 2019-03-21 RX ORDER — SILDENAFIL 100 MG/1
100 TABLET, FILM COATED ORAL DAILY PRN
Qty: 10 TABLET | Refills: 1 | Status: SHIPPED | OUTPATIENT
Start: 2019-03-21 | End: 2020-04-03 | Stop reason: SDUPTHER

## 2019-03-21 RX ORDER — WARFARIN SODIUM 5 MG/1
TABLET ORAL
Qty: 45 TABLET | Refills: 11 | Status: ON HOLD | OUTPATIENT
Start: 2019-03-21 | End: 2019-07-23 | Stop reason: SDUPTHER

## 2019-03-21 NOTE — PROGRESS NOTES
Pt questioned reports he missed Sunday dose and had coleslaw intake on Monday . No other changes reported .

## 2019-03-21 NOTE — PROGRESS NOTES
Subjective:   Patient ID:  Tim Richards is a 53 y.o. male who presents for LVAD followup visit.    Implant Date: 3/8/2018  Initials:RBB     Heartmate II RPM 9400  3/9/18 outflow graft changed     INR goal: 2-3   Bridge with Heparin   Antiplatelets: , prevent trial    TXP [facility] INTERROGATIONS 3/21/2019   Type HeartMate II   Flow 7.1   Speed 9800   PI 3.3   Power (Jackson) 7.2   LSL 9400   Pulsatility No Pulse       HPI  52 yo BM with DCM, HBP, CHF stage D s/p HM 2 comes for routine visit.  Patient is here for routine clinic visit including blood presure management. At his last visit we increase his speed to 9800 to help make his LV smaller as his BP finally controlled.        Feels well overall, denies cardiopulmonary complaints, is working quite a bit.  Meet a friend recently whom he started working out with.  No alarms / DLES is number one.  NYHA FC II.    12/06/18 TTE:  Speed 9600rpm  AV not opening, septum midline, LVEF 10%, mod to severe RV systolic reduced function, PASP 33.4mm Hg, mild MR, trace TR, intermediate CVP.    Sept 2018 [facility]  FICKCI: 2.2100  FICKCO: 5.1700  RA: 14/12 (10)  RV: 46/1  RVEDP: 16     PW: 25/29 (19)  PA: 47/24 (33)  PW_SAT: 94    Review of Systems   Constitution: Positive for weight gain (gained weight from last time again). Negative for chills, decreased appetite, diaphoresis, fever, weakness, malaise/fatigue (did not improved with increased speed this time ), night sweats and weight loss.   HENT: Negative for congestion, hearing loss, hoarse voice, nosebleeds and sore throat.    Eyes: Negative for blurred vision, double vision, pain, vision loss in left eye, vision loss in right eye and visual disturbance.   Cardiovascular: Positive for dyspnea on exertion (improved/stable). Negative for chest pain, claudication, irregular heartbeat, leg swelling, near-syncope, orthopnea, palpitations, paroxysmal nocturnal dyspnea and syncope.   Respiratory: Negative for cough, hemoptysis,  "shortness of breath, sleep disturbances due to breathing, snoring, sputum production and wheezing.    Endocrine: Negative for cold intolerance, heat intolerance, polydipsia and polyuria.   Hematologic/Lymphatic: Negative for bleeding problem. Does not bruise/bleed easily.   Skin: Negative for poor wound healing and rash.   Musculoskeletal: Negative for arthritis, falls, joint pain, muscle cramps, muscle weakness, myalgias and neck pain.   Gastrointestinal: Negative for bloating, abdominal pain, anorexia, change in bowel habit, constipation, diarrhea, hematemesis, hematochezia, jaundice, melena, nausea and vomiting.   Genitourinary: Negative for bladder incontinence, dysuria, frequency, hematuria, hesitancy, incomplete emptying and urgency.   Neurological: Negative for brief paralysis, difficulty with concentration, excessive daytime sleepiness, dizziness, focal weakness, headaches, light-headedness, numbness, paresthesias and seizures.   Psychiatric/Behavioral: Negative for depression and memory loss. The patient does not have insomnia and is not nervous/anxious.      Objective:     Doppler 90 with no pulse initially -  82 on repeat   Physical Exam   Constitutional: He appears well-developed and well-nourished. No distress.   BP (!) 90/0 (BP Location: Left arm, Patient Position: Sitting, BP Method: Medium (Manual)) Comment: doppler  Temp 98.8 °F (37.1 °C) (Oral)   Ht 6' 1" (1.854 m)   Wt 119.3 kg (263 lb)   BMI 34.70 kg/m²          HENT:   Head: Normocephalic and atraumatic.   Eyes: Conjunctivae are normal. No scleral icterus.   Neck: No JVD (at clavicle at 90 degrees) present. No thyromegaly present.   Cardiovascular:   Normal VAD sounds; DL 1   Pulmonary/Chest: Effort normal and breath sounds normal. No respiratory distress. He has no wheezes. He has no rales.   Abdominal: Soft. Bowel sounds are normal. He exhibits no distension and no mass. There is no tenderness. There is no rebound and no guarding. "   Musculoskeletal: He exhibits no edema or tenderness.   Neurological: He is alert.   Skin: Skin is warm and dry. He is not diaphoretic.   Psychiatric: He has a normal mood and affect. His behavior is normal. Judgment and thought content normal.   Nursing note and vitals reviewed.    Lab Results   Component Value Date     (H) 03/21/2019     (L) 03/21/2019    K 4.6 03/21/2019    MG 2.5 03/21/2019    CL 97 (L) 03/21/2019    CO2 24 03/21/2019    BUN 23 (H) 03/21/2019    CREATININE 2.1 (H) 03/21/2019    GLU 97 03/21/2019    HGBA1C 5.5 03/08/2018    AST 47 (H) 03/21/2019    ALT 25 03/21/2019    ALBUMIN 4.6 03/21/2019    PROT 8.3 03/21/2019    BILITOT 0.7 03/21/2019    CHOL 150 11/07/2018    HDL 85 (H) 11/07/2018    LDLCALC 53.0 (L) 11/07/2018    TRIG 60 11/07/2018       Magnesium   Date Value Ref Range Status   03/21/2019 2.5 1.6 - 2.6 mg/dL Final       Lab Results   Component Value Date    WBC 4.50 03/21/2019    HGB 13.6 (L) 03/21/2019    HCT 42.5 03/21/2019    MCV 86 03/21/2019     03/21/2019       Lab Results   Component Value Date    INR 1.7 (H) 03/21/2019    INR 3.0 (H) 03/01/2019    INR 3.1 (H) 02/27/2019       BNP   Date Value Ref Range Status   03/21/2019 369 (H) 0 - 99 pg/mL Final     Comment:     Values of less than 100 pg/ml are consistent with non-CHF populations.   02/27/2019 344 (H) 0 - 99 pg/mL Final     Comment:     Values of less than 100 pg/ml are consistent with non-CHF populations.   02/20/2019 433 (H) 0 - 99 pg/mL Final     Comment:     Values of less than 100 pg/ml are consistent with non-CHF populations.       LD   Date Value Ref Range Status   03/21/2019 257 (H) 84 - 195 U/L Final     Comment:     Results are increased in hemolyzed samples.   02/27/2019 252 (H) 84 - 195 U/L Final     Comment:     Results are increased in hemolyzed samples.   02/20/2019 249 (H) 84 - 195 U/L Final     Comment:     Results are increased in hemolyzed samples.       Assessment:      1. LVAD (left  ventricular assist device) present    2. Examination of participant in clinical trial    3. Chronic systolic heart failure    4. High risk medications (amiodarone) long-term use    5. PVT (paroxysmal ventricular tachycardia)    6. Biventricular implantable cardioverter-defibrillator in situ    7. Dilated cardiomyopathy    8. CKD (chronic kidney disease), stage III    9. 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 .   Recommend against discussion of OHT again at this time,not sure if we checked nicotine/coteniene again since last visit a couple of months ago.   Went ahead increased sildenafil to 100 daily.   Continue aspirin 81mg po daily.   Blood pressure is stable today at higher speed Would not increase further    creatinine remains elevated with CKD 3 RHC in sept 2018 showed acceptable hemo dynamics with CRT of 2.1 so I suspect this is his baseline  (had CRT of 2.3 more than six month prior to LVAD)  Recommend 2 gram sodium restriction and 1500cc fluid restriction.  Encourage physical activity with graded exercise program.  Requested patient to weigh themselves daily, and to notify us if their weight increases by more than 3 lbs in 1 day or 5 lbs in 1 week.   NYHA FC II.       UNOS Patient Status  Functional Status: 90% - Normal activity with effort minimal symptoms of disease  Physical Capacity: No Limitations  Working for Income: yes  If yes, working activity level: Working Full Time    Listed for transplant: RUPALI

## 2019-03-21 NOTE — PROGRESS NOTES
"Date of Implant with Heartmate II LVAD: 3/8/2018    PATIENT ARRIVED IN CLINIC:  Ambulatory  Accompanied by:Self     Vitals  Doppler:90  Pulsatile:no   PAIN:0 on 0-10 pain scale,  location of pain: na,   description of pain:na  Is patient currently on medications for pain?no What kind? na    VAD Interrogation:  TXP SACHI INTERROGATIONS 3/21/2019   Type HeartMate II   Flow 7.1   Speed 9800   PI 3.3   Power ([place]) 7.2   LSL 9400   Pulsatility No Pulse       History Lo.log     Problems / Issues / Alarms with VAD if any:no external power alarm, patient states he accidentally unplugged power module from wall   Any Equipment Issues? (Refer to  note for details):none  Emergency Equipment With Patient:yes   VAD Binder With Patient: no   Reviewed VAD Numbers In Binder:no  VAD Sounds: SMOOTH   Manual & Visual Inspection Of Driveline:  No kinks or tears noted     LVAD Dressing/DLES:  Appearance Of Driveline:"1"  Antibiotics:NO  Frequency of Dressing Changes:Every other day with soap and water   Stabilization Device In Use: YES [place] anchor    Pt In Need Of Management Kits?:no,   It is medically necessary to have VAD management kits in order to prevent infection or to assist in the healing of an infected DLES.    Assessment:   Complaints/reason for visit today: Routine follow up    Complaints Of Nausea / Vomiting:none   Appearance and Frequency Of Stools:Normal and formed without blood every other day   Patient reports clear, yellow urine:YES   Coping/Depression/Anxiety:Patient coping well   Sleep Habits:8 hrs /night  Sleep Aids:no  Showering :YES , Patient reminded to change dressing immediately after showering and drying off.    Activity/Exercise:gym 1 hr every other day   Driving:yes, Reminded to pull over should there be an alarm before looking down at controller.     Labs:    Chemistry        Component Value Date/Time     (L) 2019 1204    K 4.6 2019 1204    CL 97 (L) " 03/21/2019 1204    CO2 24 03/21/2019 1204    BUN 23 (H) 03/21/2019 1204    CREATININE 2.1 (H) 03/21/2019 1204    GLU 97 03/21/2019 1204        Component Value Date/Time    CALCIUM 8.9 03/21/2019 1204    ALKPHOS 76 03/21/2019 1204    AST 47 (H) 03/21/2019 1204    ALT 25 03/21/2019 1204    BILITOT 0.7 03/21/2019 1204    ESTGFRAFRICA 40.6 (A) 03/21/2019 1204    EGFRNONAA 35.1 (A) 03/21/2019 1204            Magnesium   Date Value Ref Range Status   03/21/2019 2.5 1.6 - 2.6 mg/dL Final       Lab Results   Component Value Date    WBC 4.50 03/21/2019    HGB 13.6 (L) 03/21/2019    HCT 42.5 03/21/2019    MCV 86 03/21/2019     03/21/2019       Lab Results   Component Value Date    INR 1.7 (H) 03/21/2019    INR 3.0 (H) 03/01/2019    INR 3.1 (H) 02/27/2019       BNP   Date Value Ref Range Status   03/21/2019 369 (H) 0 - 99 pg/mL Final     Comment:     Values of less than 100 pg/ml are consistent with non-CHF populations.   02/27/2019 344 (H) 0 - 99 pg/mL Final     Comment:     Values of less than 100 pg/ml are consistent with non-CHF populations.   02/20/2019 433 (H) 0 - 99 pg/mL Final     Comment:     Values of less than 100 pg/ml are consistent with non-CHF populations.       LD   Date Value Ref Range Status   03/21/2019 257 (H) 84 - 195 U/L Final     Comment:     Results are increased in hemolyzed samples.   02/27/2019 252 (H) 84 - 195 U/L Final     Comment:     Results are increased in hemolyzed samples.   02/20/2019 249 (H) 84 - 195 U/L Final     Comment:     Results are increased in hemolyzed samples.       Labs reviewed with patient: YES      Patient Satisfaction Survey Completed by Patient: no  (explained about signature and box to check)  Medication reconciliation: per MA.  Purple card updated today:YES   Coumadin Managed by: [facility].    Education: Reviewed driveline care, emergency procedures, alarms with patient.  Reminded patient never to change the controller without paging a VAD coordinator  first.  Also reviewed how to get in touch with a VAD coordinator in the event of an emergency.       Plans/Needs:Patient presents for routine followup. Patient states he is doing well overall with no complaints. He is requesting higher dose of sildenafil stating that 100 mg works for him and he is only taking 50 mg at this time. Doppler elevated at 90 today, repeat is 82. Dr. Connors ok with increased dose of sildenafil. Repeat doppler was 82. No other changes made. Patient to RTC 2 months. Refer to Md note.

## 2019-03-21 NOTE — PROGRESS NOTES
Patient wife ricardo  advised of dose instructions 7.5mg 3/21 and verbalized understanding.  Recheck Monday 3/25/19

## 2019-03-21 NOTE — LETTER
March 21, 2019        Nader Chiu  [address]  [address]  [place] [address]  Phone: [phone]  Fax: [phone]             [facility] [address] [place] [address]  Phone: [phone]   Patient: Tim Richards   MR Number: [id number]   YOB: 1965   Date of Visit: 3/21/2019       Dear Dr. Nader Chiu    Thank you for referring Tim Richards to me for evaluation. Attached you will find relevant portions of my assessment and plan of care.    If you have questions, please do not hesitate to call me. I look forward to following Tim Richards along with you.    Sincerely,    Bettye Connors MD    Enclosure    If you would like to receive this communication electronically, please contact [email or web address] or [phone] to request [employer] Link access.    [employer] Link is a tool which provides read-only access to select patient information with whom you have a relationship. Its easy to use and provides real time access to review your patients record including encounter summaries, notes, results, and demographic information.    If you feel you have received this communication in error or would no longer like to receive these types of communications, please e-mail [email or web address]

## 2019-03-26 ENCOUNTER — PATIENT MESSAGE (OUTPATIENT)
Dept: TRANSPLANT | Facility: CLINIC | Age: 54
End: 2019-03-26

## 2019-03-29 ENCOUNTER — PATIENT MESSAGE (OUTPATIENT)
Dept: TRANSPLANT | Facility: CLINIC | Age: 54
End: 2019-03-29

## 2019-04-04 RX ORDER — POTASSIUM CHLORIDE 20 MEQ/1
20 TABLET, EXTENDED RELEASE ORAL 2 TIMES DAILY
Qty: 30 TABLET | Refills: 6 | OUTPATIENT
Start: 2019-04-04

## 2019-04-09 ENCOUNTER — PATIENT MESSAGE (OUTPATIENT)
Dept: TRANSPLANT | Facility: CLINIC | Age: 54
End: 2019-04-09

## 2019-04-18 RX ORDER — POTASSIUM CHLORIDE 20 MEQ/1
20 TABLET, EXTENDED RELEASE ORAL 2 TIMES DAILY
Qty: 30 TABLET | Refills: 6 | OUTPATIENT
Start: 2019-04-18

## 2019-04-30 ENCOUNTER — ANTI-COAG VISIT (OUTPATIENT)
Dept: CARDIOLOGY | Facility: CLINIC | Age: 54
End: 2019-04-30
Payer: COMMERCIAL

## 2019-04-30 DIAGNOSIS — Z95.811 LVAD (LEFT VENTRICULAR ASSIST DEVICE) PRESENT: ICD-10-CM

## 2019-04-30 DIAGNOSIS — Z79.01 LONG TERM (CURRENT) USE OF ANTICOAGULANTS: ICD-10-CM

## 2019-04-30 PROCEDURE — 93793 PR ANTICOAGULANT MGMT FOR PT TAKING WARFARIN: ICD-10-PCS | Mod: S$GLB,,,

## 2019-04-30 PROCEDURE — 93793 ANTICOAG MGMT PT WARFARIN: CPT | Mod: S$GLB,,,

## 2019-05-08 ENCOUNTER — TELEPHONE (OUTPATIENT)
Dept: FAMILY MEDICINE | Facility: CLINIC | Age: 54
End: 2019-05-08

## 2019-05-08 NOTE — TELEPHONE ENCOUNTER
Spoke to patient. Offered appointment with a different provider. Agreed. Appointment scheduled at this time.

## 2019-05-08 NOTE — TELEPHONE ENCOUNTER
----- Message from Mirela Jacobo sent at 5/8/2019  4:24 PM CDT -----  ..Type:  Patient Returning Call    Who Called: pt     Who Left Message for Patient: Dana    Does the patient know what this is regarding?: Sooner appt.     Would the patient rather a call back or a response via [facility]? Call back     Best Call Back Number:[phone]    Additional Information:  I checked the scheduled. First available appt is 05/14.

## 2019-05-08 NOTE — TELEPHONE ENCOUNTER
----- Message from María Roberson sent at 5/8/2019 12:12 PM CDT -----  Contact: PT Portal Request  Appointment Request From: Tim Richards    With Provider: [id number]    Preferred Date Range: 5/10/2019 - 5/10/2019    Preferred Times: Any time    Reason for visit: Existing Patient    Comments:  small nodule growing in my upper thigh area under the skin./

## 2019-05-10 ENCOUNTER — OFFICE VISIT (OUTPATIENT)
Dept: FAMILY MEDICINE | Facility: CLINIC | Age: 54
End: 2019-05-10
Payer: COMMERCIAL

## 2019-05-10 VITALS — WEIGHT: 265.19 LBS | HEIGHT: 73 IN | BODY MASS INDEX: 35.15 KG/M2 | RESPIRATION RATE: 16 BRPM | TEMPERATURE: 99 F

## 2019-05-10 DIAGNOSIS — R22.42 MASS OF LEFT THIGH: Primary | ICD-10-CM

## 2019-05-10 PROCEDURE — 99213 PR OFFICE/OUTPT VISIT, EST, LEVL III, 20-29 MIN: ICD-10-PCS | Mod: S$GLB,,, | Performed by: PHYSICIAN ASSISTANT

## 2019-05-10 PROCEDURE — 99213 OFFICE O/P EST LOW 20 MIN: CPT | Mod: S$GLB,,, | Performed by: PHYSICIAN ASSISTANT

## 2019-05-10 PROCEDURE — 99999 PR PBB SHADOW E&M-EST. PATIENT-LVL IV: ICD-10-PCS | Mod: PBBFAC,,, | Performed by: PHYSICIAN ASSISTANT

## 2019-05-10 PROCEDURE — 99999 PR PBB SHADOW E&M-EST. PATIENT-LVL IV: CPT | Mod: PBBFAC,,, | Performed by: PHYSICIAN ASSISTANT

## 2019-05-10 RX ORDER — POTASSIUM CHLORIDE 1500 MG/1
20 TABLET, EXTENDED RELEASE ORAL 2 TIMES DAILY
Refills: 6 | COMMUNITY
Start: 2019-04-16 | End: 2019-05-23 | Stop reason: SDUPTHER

## 2019-05-10 NOTE — PROGRESS NOTES
Subjective:       Patient ID: Tim Richards is a 53 y.o. male.    Chief Complaint: Lump (on inner left leg thigh)    HPI: 52 yo male presents for mass of thigh. Left inner thigh. Noticed about 1 month ago. No change. Non tender. Denies fever, dysuria, testicular pain.    Review of Systems   Constitutional: Negative for fever.   Genitourinary: Negative for discharge, dysuria, penile pain, scrotal swelling and testicular pain.       Objective:      Physical Exam   Constitutional: He is oriented to person, place, and time. He appears well-developed and well-nourished.   HENT:   Head: Normocephalic and atraumatic.   Genitourinary:         Neurological: He is alert and oriented to person, place, and time.   Psychiatric: He has a normal mood and affect. His behavior is normal.   Vitals reviewed.      Assessment:       1. Mass of left thigh        Plan:         Tim was seen today for lump.    Diagnoses and all orders for this visit:    Mass of left thigh  -     US Soft Tissue Misc; Future  -     Will obtain US and refer to gen surg as needed

## 2019-05-14 RX ORDER — POTASSIUM CHLORIDE 1500 MG/1
TABLET, EXTENDED RELEASE ORAL
Qty: 30 TABLET | Refills: 6 | OUTPATIENT
Start: 2019-05-14

## 2019-05-23 ENCOUNTER — ANTI-COAG VISIT (OUTPATIENT)
Dept: CARDIOLOGY | Facility: CLINIC | Age: 54
End: 2019-05-23
Payer: COMMERCIAL

## 2019-05-23 ENCOUNTER — OFFICE VISIT (OUTPATIENT)
Dept: TRANSPLANT | Facility: CLINIC | Age: 54
End: 2019-05-23
Attending: INTERNAL MEDICINE
Payer: COMMERCIAL

## 2019-05-23 ENCOUNTER — CLINICAL SUPPORT (OUTPATIENT)
Dept: TRANSPLANT | Facility: CLINIC | Age: 54
End: 2019-05-23
Payer: COMMERCIAL

## 2019-05-23 VITALS
BODY MASS INDEX: 34.72 KG/M2 | WEIGHT: 262 LBS | HEIGHT: 73 IN | SYSTOLIC BLOOD PRESSURE: 80 MMHG | HEART RATE: 83 BPM | TEMPERATURE: 99 F

## 2019-05-23 DIAGNOSIS — N18.30 CKD (CHRONIC KIDNEY DISEASE), STAGE III: ICD-10-CM

## 2019-05-23 DIAGNOSIS — Z95.810 BIVENTRICULAR IMPLANTABLE CARDIOVERTER-DEFIBRILLATOR IN SITU: ICD-10-CM

## 2019-05-23 DIAGNOSIS — I50.42 CHRONIC COMBINED SYSTOLIC AND DIASTOLIC HEART FAILURE: ICD-10-CM

## 2019-05-23 DIAGNOSIS — T82.9XXD COMPLICATION INVOLVING LEFT VENTRICULAR ASSIST DEVICE (LVAD), SUBSEQUENT ENCOUNTER: Primary | ICD-10-CM

## 2019-05-23 DIAGNOSIS — Z79.899 HIGH RISK MEDICATIONS (NOT ANTICOAGULANTS) LONG-TERM USE: ICD-10-CM

## 2019-05-23 DIAGNOSIS — Z79.01 LONG TERM (CURRENT) USE OF ANTICOAGULANTS: ICD-10-CM

## 2019-05-23 DIAGNOSIS — I47.29 PVT (PAROXYSMAL VENTRICULAR TACHYCARDIA): ICD-10-CM

## 2019-05-23 DIAGNOSIS — Z95.811 LVAD (LEFT VENTRICULAR ASSIST DEVICE) PRESENT: ICD-10-CM

## 2019-05-23 DIAGNOSIS — I42.0 DILATED CARDIOMYOPATHY: ICD-10-CM

## 2019-05-23 DIAGNOSIS — I10 ESSENTIAL HYPERTENSION: ICD-10-CM

## 2019-05-23 DIAGNOSIS — F17.210 CIGARETTE NICOTINE DEPENDENCE WITHOUT COMPLICATION: ICD-10-CM

## 2019-05-23 DIAGNOSIS — F10.10 ALCOHOL ABUSE: ICD-10-CM

## 2019-05-23 PROCEDURE — 99214 OFFICE O/P EST MOD 30 MIN: CPT | Mod: S$GLB,,, | Performed by: INTERNAL MEDICINE

## 2019-05-23 PROCEDURE — 99214 PR OFFICE/OUTPT VISIT, EST, LEVL IV, 30-39 MIN: ICD-10-PCS | Mod: S$GLB,,, | Performed by: INTERNAL MEDICINE

## 2019-05-23 PROCEDURE — 99999 PR PBB SHADOW E&M-EST. PATIENT-LVL IV: ICD-10-PCS | Mod: PBBFAC,,, | Performed by: INTERNAL MEDICINE

## 2019-05-23 PROCEDURE — 99999 PR PBB SHADOW E&M-EST. PATIENT-LVL IV: CPT | Mod: PBBFAC,,, | Performed by: INTERNAL MEDICINE

## 2019-05-23 RX ORDER — POTASSIUM CHLORIDE 1500 MG/1
20 TABLET, EXTENDED RELEASE ORAL 2 TIMES DAILY
Qty: 60 TABLET | Refills: 11 | Status: SHIPPED | OUTPATIENT
Start: 2019-05-23 | End: 2019-08-28 | Stop reason: SDUPTHER

## 2019-05-23 RX ORDER — BUMETANIDE 2 MG/1
2 TABLET ORAL DAILY
Qty: 90 TABLET | Refills: 6 | Status: SHIPPED | OUTPATIENT
Start: 2019-05-23 | End: 2019-10-10 | Stop reason: ALTCHOICE

## 2019-05-23 NOTE — LETTER
May 23, 2019        Nader Chiu  [address]  [address]  [place] [address]  Phone: [phone]  Fax: [phone]             [facility] [address] [place] [address]  Phone: [phone]   Patient: Tim Richards   MR Number: [id number]   YOB: 1965   Date of Visit: 5/23/2019       Dear Dr. Nader Chiu    Thank you for referring Tim Richards to me for evaluation. Attached you will find relevant portions of my assessment and plan of care.    If you have questions, please do not hesitate to call me. I look forward to following Tim Richards along with you.    Sincerely,    Amparo Lopez MD    Enclosure    If you would like to receive this communication electronically, please contact [email or web address] or [phone] to request [employer] access.    [employer] is a tool which provides read-only access to select patient information with whom you have a relationship. Its easy to use and provides real time access to review your patients record including encounter summaries, notes, results, and demographic information.    If you feel you have received this communication in error or would no longer like to receive these types of communications, please e-mail [email or web address]

## 2019-05-23 NOTE — PROCEDURES
TXP SACHI INTERROGATIONS 5/23/2019 3/21/2019 2/21/2019 1/3/2019 12/6/2018 11/7/2018 9/27/2018   Type HeartMate II HeartMate II HeartMate II HeartMate3 HeartMate II HeartMate II HeartMate II   Flow 6.1 7.1 6.8 6.7 6.5 .6.5 6.9   Speed 9800 9800 9600 9600 9600 9400 9400   PI 3.3 3.3 3.6 4.2 4.5 3.5 4.0   Power ([place]) 6.9 7.2 7.0 6.9 6.7 6.5 6.7   LSL 9400 9400 9200 9200 9200 9000 9000   Pulsatility Pulse No Pulse No Pulse - Pulse No Pulse No Pulse   }Interrogation of Ventricular assist device was performed with physician analysis of device parameters and review of device function. I have personally reviewed the interrogation findings and agree with findings as stated.
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
## 2019-05-23 NOTE — PATIENT INSTRUCTIONS
No changes today    Think about trying to at least walk every day after work and maybe go the gym with your santos the other days    Keep salt intake to under 2000 mg sodium, fluids to under 2 L (64 oz)    Call us if you find yourself getting more short of breath, have more swelling or unexpected weight changes, fever, chills, alarms, bloody or black bowel movements, or drainage from your driveline

## 2019-05-23 NOTE — PROGRESS NOTES
Subjective:   Patient ID:  Tim Richards is a 53 y.o. male who presents for LVAD followup visit.    Implant Date: 3/8/2018  Initials:RBB     Heartmate II RPM 9400  3/9/18 outflow graft changed     INR goal: 2-3   Bridge with Heparin   Antiplatelets: , prevent trial    TXP SACHI INTERROGATIONS 5/23/2019   Type HeartMate II   Flow 6.1   Speed 9800   PI 3.3   Power ([place]) 6.9   LSL 9400   Pulsatility Pulse       HPI  52 yo BM with DCM, HBP, CHF stage D s/p HM 2 comes for routine visit.  Patient is here for routine clinic visit including blood presure management. He has gained signifciant weight, additionally is looking for medication for erectile dysfunction. Feels well overall, denies cardiopulmonary complaints, is working quite a bit. Was late last visit and we could not see him, counseled him on coming to appointments on time and following instructions. Late again today.    Since last visit, pt has been doing well. Says he has gained about 20 # since xmas. Has swelling in his legs today but says he went to [place] this weekend and they ate badly. Has some COLEMAN but blames being out of shape. No other HF sx. BM normal, no blood, no alarms    DLES is grade 1    Interrogation of device data reveals no alarms, normal flows and power (see VAD interrogation report for full details.)          TTE 1/3/19  · LVAD present. Base speed is 9600. The pump type is a Heartmate II.  · The interventricular septum appears midline. The aortic valve does not open.  · Decreased left ventricular systolic function. The estimated ejection fraction is 10%  · Eccentric left ventricular hypertrophy.  · Biatrial enlargement.  · Grade II (moderate) left ventricular diastolic dysfunction consistent with pseudonormalization.  · Elevated left atrial pressure.  · Moderate mitral regurgitation.  · Elevated central venous pressure (15 mm Hg).      12/06/18 TTE:  Speed 9600rpm  AV not opening, septum midline, LVEF 10%, mod to severe RV systolic  reduced function, PASP 33.4mm Hg, mild MR, trace TR, intermediate CVP.    Review of Systems   Constitution: Positive for weight gain (gained weight from last time again). Negative for chills, decreased appetite, diaphoresis, fever, malaise/fatigue (improved with increased speed), night sweats and weight loss.   HENT: Negative for congestion, hearing loss, hoarse voice, nosebleeds and sore throat.    Eyes: Negative for blurred vision, double vision, pain, vision loss in left eye, vision loss in right eye and visual disturbance.   Cardiovascular: Positive for dyspnea on exertion (improved/stable). Negative for chest pain, claudication, irregular heartbeat, leg swelling, near-syncope, orthopnea, palpitations, paroxysmal nocturnal dyspnea and syncope.   Respiratory: Negative for cough, hemoptysis, shortness of breath, sleep disturbances due to breathing, snoring, sputum production and wheezing.    Endocrine: Negative for cold intolerance, heat intolerance, polydipsia and polyuria.   Hematologic/Lymphatic: Negative for bleeding problem. Does not bruise/bleed easily.   Skin: Negative for poor wound healing and rash.   Musculoskeletal: Negative for arthritis, falls, joint pain, muscle cramps, muscle weakness, myalgias and neck pain.   Gastrointestinal: Negative for bloating, abdominal pain, anorexia, change in bowel habit, constipation, diarrhea, hematemesis, hematochezia, jaundice, melena, nausea and vomiting.   Genitourinary: Negative for bladder incontinence, dysuria, frequency, hematuria, hesitancy, incomplete emptying and urgency.   Neurological: Negative for brief paralysis, difficulty with concentration, excessive daytime sleepiness, dizziness, focal weakness, headaches, light-headedness, numbness, paresthesias, seizures and weakness.   Psychiatric/Behavioral: Negative for depression and memory loss. The patient does not have insomnia and is not nervous/anxious.      Objective:BP (!) 80/0 (BP Location: Right arm,  "Patient Position: Sitting, BP Method: Large (Manual)) Comment: doppler  Pulse 83   Temp 99.2 °F (37.3 °C) (Oral)   Ht 6' 1" (1.854 m)   Wt 118.8 kg (262 lb)   BMI 34.57 kg/m²      Doppler 80  MAP 76 pulsatile    Physical Exam   Constitutional: He appears well-developed and well-nourished. No distress.        HENT:   Head: Normocephalic and atraumatic.   Eyes: Conjunctivae are normal. No scleral icterus.   Neck: No JVD (at clavicle at 90 degrees) present. No thyromegaly present.   Cardiovascular:   Normal VAD sounds; DL 1   Pulmonary/Chest: Effort normal and breath sounds normal. No respiratory distress. He has no wheezes. He has no rales.   Abdominal: Soft. Bowel sounds are normal. He exhibits no distension and no mass. There is no tenderness. There is no rebound and no guarding.   Musculoskeletal: He exhibits no edema or tenderness.   Neurological: He is alert.   Skin: Skin is warm and dry. He is not diaphoretic.   Psychiatric: He has a normal mood and affect. His behavior is normal. Judgment and thought content normal.   Nursing note and vitals reviewed.    Lab Results   Component Value Date     (H) 05/23/2019     (L) 05/23/2019    K 4.0 05/23/2019    MG 2.4 05/23/2019    CL 97 (L) 05/23/2019    CO2 27 05/23/2019    BUN 20 05/23/2019    CREATININE 1.8 (H) 05/23/2019    GLU 90 05/23/2019    HGBA1C 5.5 03/08/2018    AST 34 05/23/2019    ALT 25 05/23/2019    ALBUMIN 4.7 05/23/2019    PROT 8.6 (H) 05/23/2019    BILITOT 0.8 05/23/2019    CHOL 150 11/07/2018    HDL 85 (H) 11/07/2018    LDLCALC 53.0 (L) 11/07/2018    TRIG 60 11/07/2018       Magnesium   Date Value Ref Range Status   05/23/2019 2.4 1.6 - 2.6 mg/dL Final       Lab Results   Component Value Date    WBC 4.40 05/23/2019    HGB 14.0 05/23/2019    HCT 43.3 05/23/2019    MCV 86 05/23/2019     05/23/2019       Lab Results   Component Value Date    INR 2.0 (H) 05/23/2019    INR 2.2 (H) 04/30/2019    INR 1.7 (H) 03/21/2019       BNP   Date " Value Ref Range Status   05/23/2019 238 (H) 0 - 99 pg/mL Final     Comment:     Values of less than 100 pg/ml are consistent with non-CHF populations.   03/21/2019 369 (H) 0 - 99 pg/mL Final     Comment:     Values of less than 100 pg/ml are consistent with non-CHF populations.   02/27/2019 344 (H) 0 - 99 pg/mL Final     Comment:     Values of less than 100 pg/ml are consistent with non-CHF populations.       LD   Date Value Ref Range Status   05/23/2019 229 (H) 84 - 195 U/L Final     Comment:     Results are increased in hemolyzed samples.   03/21/2019 257 (H) 84 - 195 U/L Final     Comment:     Results are increased in hemolyzed samples.   02/27/2019 252 (H) 84 - 195 U/L Final     Comment:     Results are increased in hemolyzed samples.       Assessment:      1. LVAD (left ventricular assist device) present    2. Examination of participant in clinical trial    3. Chronic systolic heart failure    4. High risk medications (amiodarone) long-term use    5. PVT (paroxysmal ventricular tachycardia)    6. Biventricular implantable cardioverter-defibrillator in situ    7. Dilated cardiomyopathy    8. CKD (chronic kidney disease), stage III    9. Hypertensive cardiovascular-renal disease, stage 1-4 or unspecified chronic kidney disease, with heart failure      Plan:     counselled heavily on weight loss, patient gained weight again. Not exercising as much.   Asked him  to at least walk every day after work and maybe go the gym with his santos the other days    Recommend against discussion of OHT again at this time,not sure if we checked nicotine/coteniene again since last visit a couple of months ago.     Bp controlled, INR therapeutic, LD low- no changes today     Recommend 2 gram sodium restriction and 1500cc fluid restriction.  Encourage physical activity with graded exercise program.  Requested patient to weigh themselves daily, and to notify us if their weight increases by more than 3 lbs in 1 day or 5 lbs in 1 week.    NYHA FC II.     programatic change in which pts all going back to using daily kits- pt re-educated today.             UNOS Patient Status  Functional Status: 90% - Normal activity with effort minimal symptoms of disease  Physical Capacity: No Limitations  Working for Income: yes  If yes, working activity level: Working Full Time    Listed for transplant: DT    F/u 2 mo with lipids pfts and tsh

## 2019-05-28 ENCOUNTER — HOSPITAL ENCOUNTER (OUTPATIENT)
Dept: RADIOLOGY | Facility: HOSPITAL | Age: 54
Discharge: HOME | End: 2019-05-28
Attending: PHYSICIAN ASSISTANT
Payer: COMMERCIAL

## 2019-05-28 DIAGNOSIS — R22.42 MASS OF LEFT THIGH: ICD-10-CM

## 2019-05-28 PROCEDURE — 76882 US LMTD JT/FCL EVL NVASC XTR: CPT | Mod: 26,LT,, | Performed by: RADIOLOGY

## 2019-05-28 PROCEDURE — 76882 US LMTD JT/FCL EVL NVASC XTR: CPT | Mod: TC,LT

## 2019-05-28 PROCEDURE — 76882 US EXTREMITY NON VASCULAR LIMITED LEFT: ICD-10-PCS | Mod: 26,LT,, | Performed by: RADIOLOGY

## 2019-05-31 ENCOUNTER — HOSPITAL ENCOUNTER (EMERGENCY)
Facility: HOSPITAL | Age: 54
Discharge: HOME | End: 2019-05-31
Attending: EMERGENCY MEDICINE
Payer: COMMERCIAL

## 2019-05-31 VITALS
TEMPERATURE: 98 F | HEIGHT: 73 IN | HEART RATE: 88 BPM | RESPIRATION RATE: 16 BRPM | WEIGHT: 260 LBS | OXYGEN SATURATION: 97 % | SYSTOLIC BLOOD PRESSURE: 94 MMHG | BODY MASS INDEX: 34.46 KG/M2

## 2019-05-31 DIAGNOSIS — T82.9XXA COMPLICATION INVOLVING LEFT VENTRICULAR ASSIST DEVICE (LVAD), INITIAL ENCOUNTER: Primary | ICD-10-CM

## 2019-05-31 PROCEDURE — 99282 PR EMERGENCY DEPT VISIT,LEVEL II: ICD-10-PCS | Mod: ,,, | Performed by: EMERGENCY MEDICINE

## 2019-05-31 PROCEDURE — 99282 EMERGENCY DEPT VISIT SF MDM: CPT | Mod: ,,, | Performed by: EMERGENCY MEDICINE

## 2019-05-31 PROCEDURE — 99282 EMERGENCY DEPT VISIT SF MDM: CPT

## 2019-05-31 NOTE — NURSING
Paged by TASHIA York MD re: dispo. If patient being d/c'd from ED, family member MUST bring own emergency bag from home to ED prior to going home.     MD acknowledged.

## 2019-05-31 NOTE — ED NOTES
"LVAD patient checked in to ER with LVAD "chirping"-- alarming "low battery"-- patient states that he left his extra batteries and  at home, states has been alarming x 34 minutes, Bonnie/Charge aware-- she states take patient directly to room 10, patient in no distress, alert and oriented/anxious regarding batteries- made an SANJAY 1  "

## 2019-05-31 NOTE — ED NOTES
"Pt refusing IV access at this time. Pt placed on hospital supplied LVAD monitor and home batteries placed on charging dock. Pt states "I feel fine now".  "

## 2019-05-31 NOTE — ED NOTES
Pt only has two LVAD batteries charging. Says wife is bringing other two batteries from home to be charged.

## 2019-05-31 NOTE — ED PROVIDER NOTES
Encounter Date: 5/31/2019       History     Chief Complaint   Patient presents with    LVAD Battery Low     53 M w/ PMH of CHF, HTN, CKD3, gout, HTN and LVAD placement presents w/ low LVAD batteries. Pt reports that his device began dying around 30 minutes prior to presentation. He was recently seen by his cardiologist and had his device interrogated (5/23). He reports no additional complaints. Denies chest pain, shortness of breath, abdominal pain, urinary or bowel symptoms. No recent illnesses, fevers, chills or night sweats. Denies syncope, dizziness, headaches or visual disturbances.         Review of patient's allergies indicates:   Allergen Reactions    Lisinopril Anaphylaxis     Past Medical History:   Diagnosis Date    CHF (congestive heart failure)     Dilated cardiomyopathy 1/10/2018    Disorder of kidney and ureter     CKD    Gout     HTN (hypertension)     Ventricular tachycardia (paroxysmal)      Past Surgical History:   Procedure Laterality Date    CARDIAC CATHETERIZATION  Dec. 2012    CARDIAC DEFIBRILLATOR PLACEMENT Left     CRRT-D    CLOSURE-STERNAL WOUND N/A 3/10/2018    Performed by Yg Kaufman [facility] at [facility] OR 2ND FLR    COLONOSCOPY N/A 3/6/2018    Performed by Alonso Bone MD at [facility] ENDO (2ND FLR)    INSERTION-ICD-BIVENTRICULAR N/A 10/31/2014    Per[facility]iu [facility] at [facility] CATH [facility]    Insertion-Left Ventricular Assist Device N/A 3/8/2018    Per[facility] at [facility] OR 2ND FLR    PLACEMENT-NEOPERICARDIUM  3/10/2018    Per[facility] at [facility] OR 2ND FLR    REVISION  3/9/2018    Per[facility] at [facility] OR 2ND FLR    WASHOUT- MEDIASTINUM  3/9/2018    Performed by [facility]aufman MD at [facility] OR 2ND FLR     Family History   Problem Relation Age of Onset    Hypertension Father     Diabetes Father     Coronary artery disease Father     Heart disease Father         CHF    No Known Problems Mother     Cancer Sister 54         breast CA    No Known Problems Brother      Social History     Tobacco Use    Smoking status: Former Smoker     Packs/day: 1.00     Years: 31.00     Pack years: 31.00     Types: Cigarettes     Last attempt to quit: 2018     Years since quittin.4    Smokeless tobacco: Never Used    Tobacco comment: pt is quiting on his own - pt stated not qualified for program;  pt  quit on his own   Substance Use Topics    Alcohol use: No     Alcohol/week: 0.0 oz     Comment: one fifth of gin or rum/week    Drug use: No     Review of Systems   Constitutional: Negative for chills, fatigue and fever.   HENT: Negative for trouble swallowing.    Eyes: Negative for photophobia and visual disturbance.   Respiratory: Negative for cough, chest tightness and shortness of breath.    Cardiovascular: Negative for chest pain, palpitations and leg swelling.   Gastrointestinal: Negative for abdominal distention, abdominal pain, constipation, diarrhea, nausea and vomiting.   Genitourinary: Negative for dysuria and hematuria.   Musculoskeletal: Negative for arthralgias and gait problem.   Skin: Negative for color change.   Neurological: Negative for dizziness, syncope, weakness, light-headedness and headaches.   Psychiatric/Behavioral: Negative.        Physical Exam     Initial Vitals   BP Pulse Resp Temp SpO2   19 1658 19 1642 19 1642 19 1642 19 1642   (!) 94/0 88 16 98.3 °F (36.8 °C) 97 %      MAP       --                Physical Exam    Nursing note and vitals reviewed.  Constitutional: He appears well-developed and well-nourished.   HENT:   Head: Normocephalic and atraumatic.   Mouth/Throat: No oropharyngeal exudate.   Eyes: Conjunctivae and EOM are normal. Pupils are equal, round, and reactive to light. No scleral icterus.   Neck: Normal range of motion.   Cardiovascular: Intact distal pulses.   Pulmonary/Chest: Breath sounds normal. No respiratory distress. He has no wheezes. He exhibits no  tenderness.   Abdominal: Soft. Bowel sounds are normal.   Musculoskeletal: Normal range of motion. He exhibits edema (1+ pitting).   Neurological: He is alert and oriented to person, place, and time. GCS score is 15. GCS eye subscore is 4. GCS verbal subscore is 5. GCS motor subscore is 6.   Skin: Skin is warm.   Psychiatric: He has a normal mood and affect. His behavior is normal. Judgment and thought content normal.         ED Course   Procedures  Labs Reviewed - No data to display       Imaging Results    None          Medical Decision Making:   Initial Assessment:   On presentation pt is resting comfortably in bed in no acute distress. He is breathing room air and speaking in full sentences. Pt is alert and oriented to person, place and time. CN's grossly intact. His LVAD device is alarming and reporting low battery. Breath sounds clear to auscultation. He denies any chest pain, SOB, abdominal pain, headaches or dizziness. Pt is afebrile w/ stable vital signs.   Differential Diagnosis:   Low LVAD battery   ED Management:  LVAD was hooked up to   Cardiology consulted        APC / Resident Notes:   53 M w/ pmh of CHF, HTN, CKD 3 and LVAD placement presents w/ low batteries in his LVAD. Pt presents 30 minutes after his device indicated low battery. He denies any current symptoms. Reports no chest pain or shortness of breath. Pt's device was attached to a  upon presentation. Cardiology was consulted for further interrogation of device. While being evaluated pt's wife returned home to collect extra batteries. Pt's device remained hooked to  while she was gone. He was discharged home following her return w/ fully charged LVAD batteries, in stable condition.           Attending Attestation:   Physician Attestation Statement for Resident:  As the supervising MD   Physician Attestation Statement: I have personally seen and examined this patient.   I agree with the above history. -: 53-year-old male  with past medical history of LVAD (Heartmate II), CHF, HTN, CKD3, gout, presenting for failing LVAD batteries.  Per review of records patient had LVAD interrogated 5/23 with no abnormalities, however today he began hearing a signal that his batteries were dying.  He denies any chest pain, shortness of breath, weight gain, fatigue, lower extremity edema.   As the supervising MD I agree with the above PE.   -: NAD, NCAT, A&Ox3, PERRL and EOMI, neck supple/normal ROM, CTAB, LVAD hum with BP 80, normal extremity ROM/m/s, abd s/nt/nd, no LE edema, skin dry and warm     As the supervising MD I agree with the above treatment, course, plan, and disposition.   -: Plan consult with heart transplant team for LVAD evaluation.  I have reviewed the following: old records at this facility.                       Clinical Impression:       ICD-10-CM ICD-9-CM   1. Complication involving left ventricular assist device (LVAD), initial encounter T82.9XXA 996.72         Disposition:   Disposition: Discharged  Condition: Stable         Hardeep Diamond MD  Resident  05/31/19 1900       Naz Slaughter MD  06/10/19 0803       Naz Slaughter MD  06/10/19 0804

## 2019-05-31 NOTE — SIGNIFICANT EVENT
"Called to evaluate patient in the ED. [place] his LVAD battery alarm started alarming. He knew he would not have enough time to make it back home on his battery life and therefore presented to the ED. His battery was in the "yellow" range but never lost power. He denies feeling lightheaded, short of breath, or dizzy.     LVAD coordinator was contacted. He is currently connected to [employer]. His batteries are being charged. Wife is going home now to get his emergency bag. Patient declined admission to the hospital. Patient to be discharged home from the ED once his emergency bag arrives. Notified ED physician Dr. Slaughter of recommendations.    The above plan has been discussed with Dr. Asael Marcial.  "

## 2019-05-31 NOTE — HPI
" Mr. Richards is a 53 year old male with a PMH significant for DCM, HLD, HTN, CKD, CHF stage D s/p HM 2. Last seen in clinic 5/23 and was noted to have weight gain and dyspnea that was associated to poor dietary compliance and deconditioning. He presents to the ED today after he states his LVAD "chirping"-- alarming "low battery". He left his extra batteries and  at home, states had been alarming approximately 30 minutes prior to presentation. LVAD coordinator Paz, provided  from CSU.     "

## 2019-05-31 NOTE — ED NOTES
"Pt identifiers checked and accurate with Tim Richards    Pt reports to ED with complaints of LVAD alarming low battery and no back up batteries available. Pt reports "being out and about without batteries", pt reports "feeling flushed, like anxiety because of the alarm". Pt denies CP, SOB, N/V/D, fever, chills.     LOC: The patient is awake, alert and aware of environment with an appropriate affect, the patient is oriented x 3 and speaking appropriately.  APPEARANCE: Patient resting comfortably and in no acute distress, patient is clean and well groomed.  SKIN: The skin is warm and dry, color consistent with ethnicity, patient has normal skin turgor and moist mucus membranes, skin intact.  MUSCULOSKELETAL: Patient moving all extremities well, no obvious swelling or deformities noted. Pt ambulates unassisted with steady gait.  RESPIRATORY: Airway is open and patent; respirations are spontaneous, patient has a normal effort and rate, no accessory muscle use noted.   CARDIAC: No complaints of chest pain at this time.   NEUROLOGIC: Eyes open spontaneously, behavior appropriate to situation, follows commands, purposeful motor response noted    "

## 2019-06-01 NOTE — ED NOTES
Physician gave pt discharge instructions. Pt denies any medical concerns upon discharge. Pt wife at bedside and will leave with patient and lvad equipment

## 2019-06-14 NOTE — PROGRESS NOTES
Spoke with pt wife who r/s missed inr to 6/14. She stated today is the only day the pt is off - he will not be able to get any labs 6/17.

## 2019-06-21 RX ORDER — AMIODARONE HYDROCHLORIDE 200 MG/1
TABLET ORAL
Qty: 30 TABLET | Refills: 4 | Status: ON HOLD | OUTPATIENT
Start: 2019-06-21 | End: 2019-10-18 | Stop reason: SDUPTHER

## 2019-06-26 ENCOUNTER — ANTI-COAG VISIT (OUTPATIENT)
Dept: CARDIOLOGY | Facility: CLINIC | Age: 54
End: 2019-06-26
Payer: COMMERCIAL

## 2019-06-26 DIAGNOSIS — Z79.01 LONG TERM (CURRENT) USE OF ANTICOAGULANTS: ICD-10-CM

## 2019-06-26 DIAGNOSIS — Z95.811 LVAD (LEFT VENTRICULAR ASSIST DEVICE) PRESENT: ICD-10-CM

## 2019-06-26 PROCEDURE — 93793 ANTICOAG MGMT PT WARFARIN: CPT | Mod: S$GLB,,,

## 2019-06-26 PROCEDURE — 93793 PR ANTICOAGULANT MGMT FOR PT TAKING WARFARIN: ICD-10-PCS | Mod: S$GLB,,,

## 2019-06-26 NOTE — PROGRESS NOTES
Reports appetite  is off and on - eating one meal per day . May have missed a dose last week no other changes reported.

## 2019-06-26 NOTE — PROGRESS NOTES
INR not at goal. Medications, chart, and patient findings reviewed. See calendar for adjustments to dose and follow up plan.

## 2019-07-02 ENCOUNTER — ANTI-COAG VISIT (OUTPATIENT)
Dept: CARDIOLOGY | Facility: CLINIC | Age: 54
End: 2019-07-02
Payer: COMMERCIAL

## 2019-07-02 DIAGNOSIS — Z95.811 LVAD (LEFT VENTRICULAR ASSIST DEVICE) PRESENT: ICD-10-CM

## 2019-07-02 DIAGNOSIS — Z79.01 LONG TERM (CURRENT) USE OF ANTICOAGULANTS: ICD-10-CM

## 2019-07-02 PROCEDURE — 93793 ANTICOAG MGMT PT WARFARIN: CPT | Mod: S$GLB,,,

## 2019-07-02 PROCEDURE — 93793 PR ANTICOAGULANT MGMT FOR PT TAKING WARFARIN: ICD-10-PCS | Mod: S$GLB,,,

## 2019-07-13 DIAGNOSIS — E79.0 HYPERURICEMIA: ICD-10-CM

## 2019-07-13 RX ORDER — ALLOPURINOL 100 MG/1
TABLET ORAL
Qty: 30 TABLET | Refills: 5 | Status: SHIPPED | OUTPATIENT
Start: 2019-07-13 | End: 2021-07-23 | Stop reason: SDUPTHER

## 2019-07-14 RX ORDER — LANOLIN ALCOHOL/MO/W.PET/CERES
400 CREAM (GRAM) TOPICAL 3 TIMES DAILY
Qty: 90 TABLET | Refills: 6 | Status: SHIPPED | OUTPATIENT
Start: 2019-07-14 | End: 2020-01-21

## 2019-07-14 RX ORDER — HYDRALAZINE HYDROCHLORIDE 100 MG/1
100 TABLET, FILM COATED ORAL EVERY 8 HOURS
Qty: 90 TABLET | Refills: 6 | Status: ON HOLD | OUTPATIENT
Start: 2019-07-14 | End: 2019-07-23 | Stop reason: HOSPADM

## 2019-07-16 ENCOUNTER — HOSPITAL ENCOUNTER (INPATIENT)
Facility: HOSPITAL | Age: 54
LOS: 7 days | Discharge: HOME | DRG: 394 | End: 2019-07-23
Attending: EMERGENCY MEDICINE | Admitting: INTERNAL MEDICINE
Payer: COMMERCIAL

## 2019-07-16 DIAGNOSIS — I47.29 PVT (PAROXYSMAL VENTRICULAR TACHYCARDIA): ICD-10-CM

## 2019-07-16 DIAGNOSIS — D50.0 IRON DEFICIENCY ANEMIA DUE TO CHRONIC BLOOD LOSS: Primary | ICD-10-CM

## 2019-07-16 DIAGNOSIS — K92.2 GASTROINTESTINAL HEMORRHAGE, UNSPECIFIED GASTROINTESTINAL HEMORRHAGE TYPE: ICD-10-CM

## 2019-07-16 DIAGNOSIS — D69.6 THROMBOCYTOPENIA, UNSPECIFIED: ICD-10-CM

## 2019-07-16 DIAGNOSIS — Z95.810 BIVENTRICULAR IMPLANTABLE CARDIOVERTER-DEFIBRILLATOR IN SITU: ICD-10-CM

## 2019-07-16 DIAGNOSIS — Z95.811 LVAD (LEFT VENTRICULAR ASSIST DEVICE) PRESENT: ICD-10-CM

## 2019-07-16 DIAGNOSIS — R19.7 BLOODY DIARRHEA: ICD-10-CM

## 2019-07-16 DIAGNOSIS — I42.0 DILATED CARDIOMYOPATHY: ICD-10-CM

## 2019-07-16 DIAGNOSIS — K92.2 GI BLEEDING: ICD-10-CM

## 2019-07-16 DIAGNOSIS — I10 ESSENTIAL HYPERTENSION: ICD-10-CM

## 2019-07-16 DIAGNOSIS — R06.02 SOB (SHORTNESS OF BREATH): ICD-10-CM

## 2019-07-16 DIAGNOSIS — I13.0 HYPERTENSIVE CARDIOVASCULAR-RENAL DISEASE, STAGE 1-4 OR UNSPECIFIED CHRONIC KIDNEY DISEASE, WITH HEART FAILURE: ICD-10-CM

## 2019-07-16 DIAGNOSIS — K92.2 GIB (GASTROINTESTINAL BLEEDING): ICD-10-CM

## 2019-07-16 DIAGNOSIS — K92.1 GASTROINTESTINAL HEMORRHAGE WITH MELENA: ICD-10-CM

## 2019-07-16 DIAGNOSIS — R73.9 HYPERGLYCEMIA: ICD-10-CM

## 2019-07-16 DIAGNOSIS — Z87.11 HISTORY OF BLEEDING ULCERS: ICD-10-CM

## 2019-07-16 DIAGNOSIS — Z79.899 HIGH RISK MEDICATIONS (NOT ANTICOAGULANTS) LONG-TERM USE: ICD-10-CM

## 2019-07-16 DIAGNOSIS — F10.10 ALCOHOL ABUSE: ICD-10-CM

## 2019-07-16 DIAGNOSIS — R91.1 PULMONARY NODULE SEEN ON IMAGING STUDY: ICD-10-CM

## 2019-07-16 DIAGNOSIS — R79.89 ELEVATED TROPONIN: ICD-10-CM

## 2019-07-16 DIAGNOSIS — T82.9XXA COMPLICATION INVOLVING LEFT VENTRICULAR ASSIST DEVICE (LVAD), INITIAL ENCOUNTER: ICD-10-CM

## 2019-07-16 DIAGNOSIS — N18.30 CKD (CHRONIC KIDNEY DISEASE), STAGE III: ICD-10-CM

## 2019-07-16 DIAGNOSIS — F17.210 CIGARETTE NICOTINE DEPENDENCE WITHOUT COMPLICATION: ICD-10-CM

## 2019-07-16 DIAGNOSIS — D64.9 ANEMIA, UNSPECIFIED TYPE: ICD-10-CM

## 2019-07-16 DIAGNOSIS — D72.829 LEUKOCYTOSIS, UNSPECIFIED TYPE: ICD-10-CM

## 2019-07-16 DIAGNOSIS — F17.200 SMOKER: ICD-10-CM

## 2019-07-16 DIAGNOSIS — D50.8 IRON DEFICIENCY ANEMIA SECONDARY TO INADEQUATE DIETARY IRON INTAKE: ICD-10-CM

## 2019-07-16 DIAGNOSIS — I50.42 CHRONIC COMBINED SYSTOLIC AND DIASTOLIC HEART FAILURE: ICD-10-CM

## 2019-07-16 DIAGNOSIS — Z79.01 LONG TERM (CURRENT) USE OF ANTICOAGULANTS: ICD-10-CM

## 2019-07-16 DIAGNOSIS — E80.6 HYPERBILIRUBINEMIA: ICD-10-CM

## 2019-07-16 DIAGNOSIS — Z01.818 PRE-TRANSPLANT EVALUATION FOR HEART TRANSPLANT: ICD-10-CM

## 2019-07-16 DIAGNOSIS — R06.02 SHORTNESS OF BREATH: ICD-10-CM

## 2019-07-16 LAB
ABO + RH BLD: NORMAL
ALBUMIN SERPL BCP-MCNC: 3.2 G/DL (ref 3.5–5.2)
ALLENS TEST: ABNORMAL
ALP SERPL-CCNC: 60 U/L (ref 55–135)
ALT SERPL W/O P-5'-P-CCNC: 15 U/L (ref 10–44)
ANION GAP SERPL CALC-SCNC: 8 MMOL/L (ref 8–16)
AST SERPL-CCNC: 22 U/L (ref 10–40)
BASOPHILS # BLD AUTO: 0.03 K/UL (ref 0–0.2)
BASOPHILS # BLD AUTO: 0.03 K/UL (ref 0–0.2)
BASOPHILS NFR BLD: 0.2 % (ref 0–1.9)
BASOPHILS NFR BLD: 0.5 % (ref 0–1.9)
BILIRUB SERPL-MCNC: 0.5 MG/DL (ref 0.1–1)
BLD GP AB SCN CELLS X3 SERPL QL: NORMAL
BLD PROD TYP BPU: NORMAL
BLD PROD TYP BPU: NORMAL
BLOOD UNIT EXPIRATION DATE: NORMAL
BLOOD UNIT EXPIRATION DATE: NORMAL
BLOOD UNIT TYPE CODE: 5100
BLOOD UNIT TYPE CODE: 5100
BLOOD UNIT TYPE: NORMAL
BLOOD UNIT TYPE: NORMAL
BNP SERPL-MCNC: 47 PG/ML (ref 0–99)
BUN SERPL-MCNC: 28 MG/DL (ref 6–20)
CALCIUM SERPL-MCNC: 8.9 MG/DL (ref 8.7–10.5)
CHLORIDE SERPL-SCNC: 104 MMOL/L (ref 95–110)
CK SERPL-CCNC: 82 U/L (ref 20–200)
CO2 SERPL-SCNC: 24 MMOL/L (ref 23–29)
CODING SYSTEM: NORMAL
CODING SYSTEM: NORMAL
CREAT SERPL-MCNC: 2 MG/DL (ref 0.5–1.4)
DIFFERENTIAL METHOD: ABNORMAL
DIFFERENTIAL METHOD: ABNORMAL
DISPENSE STATUS: NORMAL
DISPENSE STATUS: NORMAL
EOSINOPHIL # BLD AUTO: 0 K/UL (ref 0–0.5)
EOSINOPHIL # BLD AUTO: 0 K/UL (ref 0–0.5)
EOSINOPHIL NFR BLD: 0.1 % (ref 0–8)
EOSINOPHIL NFR BLD: 0.6 % (ref 0–8)
ERYTHROCYTE [DISTWIDTH] IN BLOOD BY AUTOMATED COUNT: 15 % (ref 11.5–14.5)
ERYTHROCYTE [DISTWIDTH] IN BLOOD BY AUTOMATED COUNT: 15.2 % (ref 11.5–14.5)
EST. GFR  (AFRICAN AMERICAN): 43.1 ML/MIN/1.73 M^2
EST. GFR  (NON AFRICAN AMERICAN): 37.3 ML/MIN/1.73 M^2
GLUCOSE SERPL-MCNC: 112 MG/DL (ref 70–110)
HCO3 UR-SCNC: 22.3 MMOL/L (ref 24–28)
HCT VFR BLD AUTO: 25 % (ref 40–54)
HCT VFR BLD AUTO: 25.4 % (ref 40–54)
HGB BLD-MCNC: 7.9 G/DL (ref 14–18)
HGB BLD-MCNC: 7.9 G/DL (ref 14–18)
IMM GRANULOCYTES # BLD AUTO: 0.02 K/UL (ref 0–0.04)
IMM GRANULOCYTES # BLD AUTO: 0.1 K/UL (ref 0–0.04)
IMM GRANULOCYTES NFR BLD AUTO: 0.3 % (ref 0–0.5)
IMM GRANULOCYTES NFR BLD AUTO: 0.8 % (ref 0–0.5)
INR PPP: 2.7 (ref 0.8–1.2)
LDH SERPL L TO P-CCNC: 190 U/L (ref 110–260)
LYMPHOCYTES # BLD AUTO: 0.9 K/UL (ref 1–4.8)
LYMPHOCYTES # BLD AUTO: 1 K/UL (ref 1–4.8)
LYMPHOCYTES NFR BLD: 13.5 % (ref 18–48)
LYMPHOCYTES NFR BLD: 8.3 % (ref 18–48)
MCH RBC QN AUTO: 28.3 PG (ref 27–31)
MCH RBC QN AUTO: 28.6 PG (ref 27–31)
MCHC RBC AUTO-ENTMCNC: 31.1 G/DL (ref 32–36)
MCHC RBC AUTO-ENTMCNC: 31.6 G/DL (ref 32–36)
MCV RBC AUTO: 90 FL (ref 82–98)
MCV RBC AUTO: 92 FL (ref 82–98)
MONOCYTES # BLD AUTO: 0.7 K/UL (ref 0.3–1)
MONOCYTES # BLD AUTO: 0.9 K/UL (ref 0.3–1)
MONOCYTES NFR BLD: 11.2 % (ref 4–15)
MONOCYTES NFR BLD: 7.4 % (ref 4–15)
NEUTROPHILS # BLD AUTO: 10.1 K/UL (ref 1.8–7.7)
NEUTROPHILS # BLD AUTO: 4.9 K/UL (ref 1.8–7.7)
NEUTROPHILS NFR BLD: 73.9 % (ref 38–73)
NEUTROPHILS NFR BLD: 83.2 % (ref 38–73)
NRBC BLD-RTO: 0 /100 WBC
NRBC BLD-RTO: 0 /100 WBC
PCO2 BLDA: 37.2 MMHG (ref 35–45)
PH SMN: 7.39 [PH] (ref 7.35–7.45)
PLATELET # BLD AUTO: 169 K/UL (ref 150–350)
PLATELET # BLD AUTO: 202 K/UL (ref 150–350)
PMV BLD AUTO: 10.4 FL (ref 9.2–12.9)
PMV BLD AUTO: 10.4 FL (ref 9.2–12.9)
PO2 BLDA: 34 MMHG (ref 40–60)
POC BE: -3 MMOL/L
POC SATURATED O2: 65 % (ref 95–100)
POC TCO2: 23 MMOL/L (ref 24–29)
POTASSIUM SERPL-SCNC: 4.5 MMOL/L (ref 3.5–5.1)
PROT SERPL-MCNC: 6 G/DL (ref 6–8.4)
PROTHROMBIN TIME: 26.1 SEC (ref 9–12.5)
RBC # BLD AUTO: 2.76 M/UL (ref 4.6–6.2)
RBC # BLD AUTO: 2.79 M/UL (ref 4.6–6.2)
SAMPLE: ABNORMAL
SITE: ABNORMAL
SODIUM SERPL-SCNC: 136 MMOL/L (ref 136–145)
TRANS ERYTHROCYTES VOL PATIENT: NORMAL ML
TRANS ERYTHROCYTES VOL PATIENT: NORMAL ML
TROPONIN I SERPL DL<=0.01 NG/ML-MCNC: 0.17 NG/ML (ref 0–0.03)
WBC # BLD AUTO: 12.09 K/UL (ref 3.9–12.7)
WBC # BLD AUTO: 6.61 K/UL (ref 3.9–12.7)

## 2019-07-16 PROCEDURE — 93010 ELECTROCARDIOGRAM REPORT: CPT | Mod: ,,, | Performed by: INTERNAL MEDICINE

## 2019-07-16 PROCEDURE — 83615 LACTATE (LD) (LDH) ENZYME: CPT

## 2019-07-16 PROCEDURE — 25000003 PHARM REV CODE 250: Performed by: PHYSICIAN ASSISTANT

## 2019-07-16 PROCEDURE — C9113 INJ PANTOPRAZOLE SODIUM, VIA: HCPCS | Performed by: PHYSICIAN ASSISTANT

## 2019-07-16 PROCEDURE — 93750 PR INTERROGATE VENT ASSIST DEV, IN PERSON, W PHYSICIAN ANALYSIS: ICD-10-PCS | Mod: ,,, | Performed by: INTERNAL MEDICINE

## 2019-07-16 PROCEDURE — 93750 INTERROGATION VAD IN PERSON: CPT | Mod: ,,, | Performed by: INTERNAL MEDICINE

## 2019-07-16 PROCEDURE — 63600175 PHARM REV CODE 636 W HCPCS: Performed by: PHYSICIAN ASSISTANT

## 2019-07-16 PROCEDURE — P9021 RED BLOOD CELLS UNIT: HCPCS

## 2019-07-16 PROCEDURE — 25000003 PHARM REV CODE 250: Performed by: STUDENT IN AN ORGANIZED HEALTH CARE EDUCATION/TRAINING PROGRAM

## 2019-07-16 PROCEDURE — 99291 PR CRITICAL CARE, E/M 30-74 MINUTES: ICD-10-PCS | Mod: ,,, | Performed by: EMERGENCY MEDICINE

## 2019-07-16 PROCEDURE — 82803 BLOOD GASES ANY COMBINATION: CPT

## 2019-07-16 PROCEDURE — 99291 CRITICAL CARE FIRST HOUR: CPT | Mod: 25

## 2019-07-16 PROCEDURE — 25000003 PHARM REV CODE 250

## 2019-07-16 PROCEDURE — 99900035 HC TECH TIME PER 15 MIN (STAT)

## 2019-07-16 PROCEDURE — 87209 SMEAR COMPLEX STAIN: CPT

## 2019-07-16 PROCEDURE — 96360 HYDRATION IV INFUSION INIT: CPT

## 2019-07-16 PROCEDURE — 82550 ASSAY OF CK (CPK): CPT

## 2019-07-16 PROCEDURE — 87427 SHIGA-LIKE TOXIN AG IA: CPT | Mod: 59

## 2019-07-16 PROCEDURE — 93005 ELECTROCARDIOGRAM TRACING: CPT

## 2019-07-16 PROCEDURE — 93010 EKG 12-LEAD: ICD-10-PCS | Mod: ,,, | Performed by: INTERNAL MEDICINE

## 2019-07-16 PROCEDURE — 86920 COMPATIBILITY TEST SPIN: CPT

## 2019-07-16 PROCEDURE — 36430 TRANSFUSION BLD/BLD COMPNT: CPT

## 2019-07-16 PROCEDURE — 82272 OCCULT BLD FECES 1-3 TESTS: CPT

## 2019-07-16 PROCEDURE — 85610 PROTHROMBIN TIME: CPT

## 2019-07-16 PROCEDURE — 27000221 HC OXYGEN, UP TO 24 HOURS

## 2019-07-16 PROCEDURE — 99291 CRITICAL CARE FIRST HOUR: CPT | Mod: ,,, | Performed by: EMERGENCY MEDICINE

## 2019-07-16 PROCEDURE — 87045 FECES CULTURE AEROBIC BACT: CPT

## 2019-07-16 PROCEDURE — 84484 ASSAY OF TROPONIN QUANT: CPT

## 2019-07-16 PROCEDURE — 20000000 HC ICU ROOM

## 2019-07-16 PROCEDURE — 27201040 HC RC 50 FILTER: Mod: 91

## 2019-07-16 PROCEDURE — 86850 RBC ANTIBODY SCREEN: CPT

## 2019-07-16 PROCEDURE — 89055 LEUKOCYTE ASSESSMENT FECAL: CPT

## 2019-07-16 PROCEDURE — 85025 COMPLETE CBC W/AUTO DIFF WBC: CPT

## 2019-07-16 PROCEDURE — 83880 ASSAY OF NATRIURETIC PEPTIDE: CPT

## 2019-07-16 PROCEDURE — 27000248 HC VAD-ADDITIONAL DAY

## 2019-07-16 PROCEDURE — 80053 COMPREHEN METABOLIC PANEL: CPT

## 2019-07-16 PROCEDURE — 87046 STOOL CULTR AEROBIC BACT EA: CPT

## 2019-07-16 PROCEDURE — 94761 N-INVAS EAR/PLS OXIMETRY MLT: CPT

## 2019-07-16 RX ORDER — SPIRONOLACTONE 25 MG/1
50 TABLET ORAL DAILY
Status: DISCONTINUED | OUTPATIENT
Start: 2019-07-17 | End: 2019-07-16

## 2019-07-16 RX ORDER — HYDROCODONE BITARTRATE AND ACETAMINOPHEN 500; 5 MG/1; MG/1
TABLET ORAL
Status: DISCONTINUED | OUTPATIENT
Start: 2019-07-16 | End: 2019-07-22

## 2019-07-16 RX ORDER — POLYETHYLENE GLYCOL 3350, SODIUM SULFATE ANHYDROUS, SODIUM BICARBONATE, SODIUM CHLORIDE, POTASSIUM CHLORIDE 236; 22.74; 6.74; 5.86; 2.97 G/4L; G/4L; G/4L; G/4L; G/4L
4000 POWDER, FOR SOLUTION ORAL ONCE
Status: COMPLETED | OUTPATIENT
Start: 2019-07-16 | End: 2019-07-16

## 2019-07-16 RX ORDER — PANTOPRAZOLE SODIUM 40 MG/10ML
80 INJECTION, POWDER, LYOPHILIZED, FOR SOLUTION INTRAVENOUS ONCE
Status: COMPLETED | OUTPATIENT
Start: 2019-07-16 | End: 2019-07-16

## 2019-07-16 RX ORDER — FERROUS GLUCONATE 324(37.5)
324 TABLET ORAL
Status: DISCONTINUED | OUTPATIENT
Start: 2019-07-17 | End: 2019-07-23 | Stop reason: HOSPADM

## 2019-07-16 RX ORDER — POTASSIUM CHLORIDE 20 MEQ/1
20 TABLET, EXTENDED RELEASE ORAL 2 TIMES DAILY
Status: DISCONTINUED | OUTPATIENT
Start: 2019-07-16 | End: 2019-07-17

## 2019-07-16 RX ORDER — LANOLIN ALCOHOL/MO/W.PET/CERES
400 CREAM (GRAM) TOPICAL 3 TIMES DAILY
Status: DISCONTINUED | OUTPATIENT
Start: 2019-07-16 | End: 2019-07-23 | Stop reason: HOSPADM

## 2019-07-16 RX ORDER — PANTOPRAZOLE SODIUM 40 MG/10ML
40 INJECTION, POWDER, LYOPHILIZED, FOR SOLUTION INTRAVENOUS 2 TIMES DAILY
Status: DISCONTINUED | OUTPATIENT
Start: 2019-07-17 | End: 2019-07-22

## 2019-07-16 RX ORDER — PANTOPRAZOLE SODIUM 40 MG/10ML
40 INJECTION, POWDER, LYOPHILIZED, FOR SOLUTION INTRAVENOUS 2 TIMES DAILY
Status: DISCONTINUED | OUTPATIENT
Start: 2019-07-16 | End: 2019-07-16

## 2019-07-16 RX ORDER — ALLOPURINOL 100 MG/1
100 TABLET ORAL DAILY
Status: DISCONTINUED | OUTPATIENT
Start: 2019-07-16 | End: 2019-07-23 | Stop reason: HOSPADM

## 2019-07-16 RX ORDER — AMIODARONE HYDROCHLORIDE 200 MG/1
200 TABLET ORAL DAILY
Status: DISCONTINUED | OUTPATIENT
Start: 2019-07-16 | End: 2019-07-23 | Stop reason: HOSPADM

## 2019-07-16 RX ORDER — HYDROCODONE BITARTRATE AND ACETAMINOPHEN 500; 5 MG/1; MG/1
TABLET ORAL
Status: DISCONTINUED | OUTPATIENT
Start: 2019-07-17 | End: 2019-07-22

## 2019-07-16 RX ADMIN — ALLOPURINOL 100 MG: 100 TABLET ORAL at 03:07

## 2019-07-16 RX ADMIN — MAGNESIUM OXIDE TAB 400 MG (241.3 MG ELEMENTAL MG) 400 MG: 400 (241.3 MG) TAB at 03:07

## 2019-07-16 RX ADMIN — POTASSIUM CHLORIDE 20 MEQ: 1500 TABLET, EXTENDED RELEASE ORAL at 08:07

## 2019-07-16 RX ADMIN — PANTOPRAZOLE SODIUM 80 MG: 40 INJECTION, POWDER, LYOPHILIZED, FOR SOLUTION INTRAVENOUS at 05:07

## 2019-07-16 RX ADMIN — SODIUM CHLORIDE 500 ML: 0.9 INJECTION, SOLUTION INTRAVENOUS at 01:07

## 2019-07-16 RX ADMIN — POLYETHYLENE GLYCOL 3350, SODIUM SULFATE ANHYDROUS, SODIUM BICARBONATE, SODIUM CHLORIDE, POTASSIUM CHLORIDE 4000 ML: 236; 22.74; 6.74; 5.86; 2.97 POWDER, FOR SOLUTION ORAL at 09:07

## 2019-07-16 RX ADMIN — MAGNESIUM OXIDE TAB 400 MG (241.3 MG ELEMENTAL MG) 400 MG: 400 (241.3 MG) TAB at 08:07

## 2019-07-16 RX ADMIN — AMIODARONE HYDROCHLORIDE 200 MG: 200 TABLET ORAL at 03:07

## 2019-07-16 NOTE — HPI
53M with hx of DCM, HBG, CHF stage D s/p HM2 presenting with bloody bowel movements since yesterday evening. He had been doing well at home until yesterday. About 2 hours after eating some leftover beef, he began having abdominal cramping/throbbing/gurgling and subsequently had several large bowel movements. Reports dark brown stool mixed with BRB. No prior hx of GIB. Wife reports he noted some abdominal burning about 1 week ago but pt does not recall. Denies N/V. Reports abdominal cramping pain all over abdomen, worse in lower quadrants L>R.     Here Hgb 14.5 two weeks ago to 7.9 today. Normotensive. Feeling general malaise and mildly short of breath. INR 2.7.

## 2019-07-16 NOTE — H&P (VIEW-ONLY)
"[facility]  Gastroenterology  Consult Note    Patient Name: Tim Richards  MRN: [id number]  Admission Date: 7/16/2019  Hospital Length of Stay: 0 days  Code Status: Full Code   Attending Provider: Antonio Hadley Jr.,*   Consulting Provider: Kirit Saldaña MD  Primary Care Physician: Ja Méndez MD  Principal Problem:<principal problem not specified>    Inpatient consult to Gastroenterology  Consult performed by: Kirit Saldaña MD  Consult ordered by: Yohan Rizvi MD        Subjective:     HPI: Tim Richards is a 53 y.o. male with history of HTN, gout, CHF, dilated CMP, has LVAD in place comes in with symptoms of BRBPR.     Yesterday afternoon he ate, around 6pm he felt "grumbling" in lower abdomen, and felt dizzy, sat on the toilet, had bloody bright red diarrhea mixed with stool, had numerous amounts, ~14 since yesterday 6pm, also felt dizzy when standing up from seated position. He did not loose consciousness. He takes coumadin, INR is 2.7, hb is 7.9 down from 14 2 weeks ago, BUN 28, cr 2, denies epigastric pain. Last BM was at 12 PM still bloody, last time he ate was around 7:30 PM yesterday. He denies NSAID use, uses asa 81 daily, smokes 31 pack years, no etoh use, no liver disease or history of varicies, denies severe retching, hx of GI bleed, past colonoscopy was done on 3/6/19 one 15 mm polyp in descending colon removed with hot snare, never had EGD done.         Past Medical History:   Diagnosis Date    CHF (congestive heart failure)     Dilated cardiomyopathy 1/10/2018    Disorder of kidney and ureter     CKD    Gout     HTN (hypertension)     Ventricular tachycardia (paroxysmal)        Past Surgical History:   Procedure Laterality Date    CARDIAC CATHETERIZATION  Dec. 2012    CARDIAC DEFIBRILLATOR PLACEMENT Left     CRRT-D    CLOSURE-STERNAL WOUND N/A 3/10/2018    Performed [facility] at [facility] OR [address] [place] [address]    COLONOSCOPY N/A " 3/6/2018    Performed by Alonso Bone MD at [facility] ENDO (2ND FLR)    INSERTION-ICD-BIVENTRICULAR N/A 10/31/2014    Performed by [facility] at [facility] CATH LAB    Insertion-Left Ventricular Assist Device N/A 3/8/2018    Performed by [facility] at [facility] OR 2ND FLR    PLACEMENT-NEOPERICARDIUM  3/10/2018    Performed by [facility] MD at [facility] OR 2ND FLR    REVISION  3/9/2018    Performed by [facility] MD at [facility] OR 2ND FLR    WASHOUT- MEDIASTINUM  3/9/2018    Performed by Yg Kaufman MD at [facility] OR 2ND FLR       Review of patient's allergies indicates:   Allergen Reactions    Lisinopril Anaphylaxis     Family History       Problem Relation (Age of Onset)    Brain Hemorrhage Father    No Known Problems Mother, Brother    Ovarian cancer Sister          Tobacco Use    Smoking status: Former Smoker     Packs/day: 1.00     Years: 31.00     Pack years: 31.00     Types: Cigarettes     Last attempt to quit: 2018     Years since quittin.5    Smokeless tobacco: Never Used    Tobacco comment: pt is quiting on his own - pt stated not qualified for program;  pt  quit on his own   Substance and Sexual Activity    Alcohol use: No     Alcohol/week: 0.0 oz     Comment: one fifth of gin or rum/week    Drug use: No    Sexual activity: Yes     Partners: Female     Birth control/protection: None     Comment: 10/5/17  with same partner 7 years      Review of Systems   Constitutional: Positive for appetite change.   HENT: Negative for congestion and trouble swallowing.    Eyes: Negative for visual disturbance.   Respiratory: Negative for chest tightness.    Cardiovascular: Negative for chest pain.   Gastrointestinal: Positive for abdominal pain, blood in stool and diarrhea. Negative for abdominal distention, nausea, rectal pain and vomiting.   Genitourinary: Negative for dysuria.   Neurological: Positive for light-headedness.     Objective:     Vital Signs (Most Recent):  Temp: 99.3 °F  (37.4 °C) (07/16/19 1324)  Pulse: 90 (07/16/19 1449)  Resp: (!) 26 (07/16/19 1449)  BP: (!) 84/0(LVAD patient  84/ palp) (07/16/19 1324)  SpO2: 96 % (07/16/19 1449) Vital Signs (24h Range):  Temp:  [98.6 °F (37 °C)-99.3 °F (37.4 °C)] 99.3 °F (37.4 °C)  Pulse:  [90-93] 90  Resp:  [18-26] 26  SpO2:  [90 %-100 %] 96 %  BP: (84)/(0) 84/0     Weight: 117.9 kg (260 lb) (07/16/19 1219)  Body mass index is 35.26 kg/m².      Physical Exam   Constitutional: No distress.   HENT:   Head: Normocephalic and atraumatic.   Eyes: Conjunctivae are normal. No scleral icterus.   Neck: Neck supple.   Cardiovascular: Normal rate and regular rhythm.   Pulmonary/Chest: Effort normal and breath sounds normal.   Abdominal: Soft. Normal appearance and bowel sounds are normal. He exhibits no distension and no mass. There is no tenderness. There is no rebound and no guarding.   LVAD in left abdominal area   Neurological: He is alert.   Skin: Skin is warm and dry. He is not diaphoretic.   Vitals reviewed.      Significant Labs:  All pertinent lab results from the last 24 hours have been reviewed.    Significant Imaging:  Imaging results within the past 24 hours have been reviewed.    Assessment/Plan:       Elevated INR, on coumadin for LVAD  Hematochezia   CMP on asa 81 daily  Orthostatic symptoms  Acute anemia  LLQ abdominal pain  -NPO from midnight  -volume resuscitation per primary  -transfuse to keep at least above 7, per primary  -PPI 80 once, then PPI ggt, or PPI 40 BID  -Goal hb atleast >7, plt >50, INR <1.5  -avoid NSAIDS, ASA if possible  -try to bring INR closer to 2 if possible  -CT abdomen w/o due to kidney function  -recommend stool studies  -go lytely tonight  -EGD/colon planned from 7/17  -discussed with primary team      Thank you for your consult.     Kirit Saldaña MD  Gastroenterology Fellow PGY IV   [facility]

## 2019-07-16 NOTE — SUBJECTIVE & OBJECTIVE
Past Medical History:   Diagnosis Date    CHF (congestive heart failure)     Dilated cardiomyopathy 1/10/2018    Disorder of kidney and ureter     CKD    Gout     HTN (hypertension)     Ventricular tachycardia (paroxysmal)        Past Surgical History:   Procedure Laterality Date    CARDIAC CATHETERIZATION  Dec. 2012    CARDIAC DEFIBRILLATOR PLACEMENT Left     CRRT-D    CLOSURE-STERNAL WOUND N/A 3/10/2018    Performed by [facility] at [facility] OR 2ND FLR    COLONOSCOPY N/A 3/6/2018    Performed by Alonso Bone MD at [facility] ENDO (2ND FLR)    INSERTION-ICD-BIVENTRICULAR N/A 10/31/2014    [facility] CATH LAB    Insertion-Left Ventricular Assist Device N/A 3/8/2018    Performed by Yg [facility]man MD at [facility] OR 2ND FLR    PLACEMENT-NEOPERICARDIUM  3/10/2018    Performed by [facility] MD at [facility] OR 2ND FLR    REVISION  3/9/2018    Performed by Yg [facility]man MD at [facility] OR 2ND FLR    WASHOUT- MEDIASTINUM  3/9/2018    Performed by Yg Kaufman MD at [facility] OR 2ND FLR       Review of patient's allergies indicates:   Allergen Reactions    Lisinopril Anaphylaxis       Current Facility-Administered Medications   Medication    0.9%  NaCl infusion (for blood administration)    allopurinol tablet 100 mg    amiodarone tablet 200 mg    [START ON 7/17/2019] ferrous gluconate tablet 324 mg    magnesium oxide tablet 400 mg    [START ON 7/17/2019] pantoprazole injection 40 mg    pantoprazole injection 80 mg    polyethylene glycol (GoLYTELY) solution    potassium chloride SA CR tablet 20 mEq     Current Outpatient Medications   Medication Sig    allopurinol (ZYLOPRIM) 100 MG tablet TAKE 1 TABLET BY MOUTH EVERY DAY    amiodarone (PACERONE) 200 MG Tab TAKE 1 TABLET (200 MG TOTAL) BY MOUTH ONCE DAILY.    amLODIPine (NORVASC) 10 MG tablet Take 1 tablet (10 mg total) by mouth once daily.    aspirin (ECOTRIN) 81 MG EC tablet Take 1 tablet (81 mg total) by mouth once daily.     bumetanide (BUMEX) 2 MG tablet Take 1 tablet (2 mg total) by mouth once daily.    doxazosin (CARDURA) 8 MG Tab Take 1 tablet (8 mg total) by mouth every 12 (twelve) hours.    ferrous gluconate (FERGON) 324 MG tablet Take 1 tablet (324 mg total) by mouth daily with breakfast.    hydrALAZINE (APRESOLINE) 100 MG tablet TAKE 1 TABLET (100 MG TOTAL) BY MOUTH EVERY 8 (EIGHT) HOURS.    magnesium oxide (MAG-OX) 400 mg (241.3 mg magnesium) tablet TAKE 1 TABLET (400 MG TOTAL) BY MOUTH 3 (THREE) TIMES DAILY.    pantoprazole (PROTONIX) 40 MG tablet TAKE 1 TABLET (40 MG TOTAL) BY MOUTH ONCE DAILY.    potassium chloride (K-TAB) 20 mEq Take 1 tablet (20 mEq total) by mouth 2 (two) times daily.    spironolactone (ALDACTONE) 50 MG tablet Take 1 tablet (50 mg total) by mouth once daily.    warfarin (COUMADIN) 5 MG tablet TAKE 1 TABLETS (5MG) BY MOUTH ON TUES, THURS, SAT. TAKE 1.5 TABLETS (7.5MG) ALL OTHER DAYS.    sildenafil (VIAGRA) 100 MG tablet Take 1 tablet (100 mg total) by mouth daily as needed for Erectile Dysfunction.     Family History     Problem Relation (Age of Onset)    Cancer Sister (54)    Coronary artery disease Father    Diabetes Father    Heart disease Father    Hypertension Father    No Known Problems Mother, Brother        Tobacco Use    Smoking status: Former Smoker     Packs/day: 1.00     Years: 31.00     Pack years: 31.00     Types: Cigarettes     Last attempt to quit: 2018     Years since quittin.5    Smokeless tobacco: Never Used    Tobacco comment: pt is quiting on his own - pt stated not qualified for program;  pt  quit on his own   Substance and Sexual Activity    Alcohol use: No     Alcohol/week: 0.0 oz     Comment: one fifth of gin or rum/week    Drug use: No    Sexual activity: Yes     Partners: Female     Birth control/protection: None     Comment: 10/5/17  with same partner 7 years      Review of Systems   Constitutional: Positive for fatigue. Negative for chills and  fever.   Respiratory: Positive for shortness of breath.    Cardiovascular: Negative for leg swelling.   Gastrointestinal: Positive for abdominal pain and blood in stool. Negative for nausea and vomiting.   Neurological: Positive for dizziness and light-headedness. Negative for syncope.     Objective:     Vital Signs (Most Recent):  Temp: 99.3 °F (37.4 °C) (07/16/19 1324)  Pulse: 90 (07/16/19 1449)  Resp: (!) 26 (07/16/19 1449)  BP: (!) 84/0(LVAD patient  84/ palp) (07/16/19 1324)  SpO2: 96 % (07/16/19 1449) Vital Signs (24h Range):  Temp:  [98.6 °F (37 °C)-99.3 °F (37.4 °C)] 99.3 °F (37.4 °C)  Pulse:  [90-93] 90  Resp:  [18-26] 26  SpO2:  [90 %-100 %] 96 %  BP: (84)/(0) 84/0     Patient Vitals for the past 72 hrs (Last 3 readings):   Weight   07/16/19 1219 117.9 kg (260 lb)     Body mass index is 35.26 kg/m².      Intake/Output Summary (Last 24 hours) at 7/16/2019 1553  Last data filed at 7/16/2019 1415  Gross per 24 hour   Intake 500 ml   Output --   Net 500 ml       Physical Exam   Constitutional: He is oriented to person, place, and time. He appears well-developed and well-nourished.   Neck: Normal range of motion. Neck supple. JVD (right above clavicle) present.   Cardiovascular: Normal rate and regular rhythm.   Normal VAD hum   Pulmonary/Chest: Effort normal and breath sounds normal. He has no wheezes. He has no rales.   Abdominal: Soft. Bowel sounds are normal. There is no tenderness.   Musculoskeletal: He exhibits no edema.   Neurological: He is alert and oriented to person, place, and time.   Skin: Skin is warm and dry.       Significant Labs:  CBC:  Recent Labs   Lab 07/16/19  1305   WBC 6.61   RBC 2.79*   HGB 7.9*   HCT 25.0*      MCV 90   MCH 28.3   MCHC 31.6*     BNP:  Recent Labs   Lab 07/16/19  1305   BNP 47     CMP:  Recent Labs   Lab 07/16/19  1305   *   CALCIUM 8.9   ALBUMIN 3.2*   PROT 6.0      K 4.5   CO2 24      BUN 28*   CREATININE 2.0*   ALKPHOS 60   ALT 15   AST 22    BILITOT 0.5      Coagulation:   Recent Labs   Lab 07/16/19  1305   INR 2.7*     LDH:  Recent Labs   Lab 07/16/19  1305        Microbiology:  Microbiology Results (last 7 days)     Procedure Component Value Units Date/Time    Stool culture [[id number]]     Order Status:  No result Specimen:  Stool           I have reviewed all pertinent labs within the past 24 hours.    Diagnostic Results:  I have reviewed all pertinent imaging results/findings within the past 24 hours.

## 2019-07-16 NOTE — H&P
[facility]  Heart Transplant  H&P    Patient Name: Tim Richards  MRN: [id number]  Admission Date: 7/16/2019  Attending Physician: Antonio Hadley Jr.,*  Primary Care Provider: Ja Méndez MD  Principal Problem:GIB (gastrointestinal bleeding)    Subjective:     History of Present Illness:  53 M with hx of DCM, s/p HM2 3/8/18 presents to ER with BRBPR and LH/dizziness. Pt says he was feeling fine until yesterday afternoon after lunch. He says his stomach started grumbling a lot and then he started feeling LH/dizzy with standing. He started having bloody diarrhea around 6 pm last night. Last episode 11:30 this am. Having numbness in lips and hands at times. C/o LLQ abd pain. No N/V.     In ER, Hgb was found to be 7.9, down from 14.5 2 weeks ago      Past Medical History:   Diagnosis Date    CHF (congestive heart failure)     Dilated cardiomyopathy 1/10/2018    Disorder of kidney and ureter     CKD    Gout     HTN (hypertension)     Ventricular tachycardia (paroxysmal)        Past Surgical History:   Procedure Laterality Date    CARDIAC CATHETERIZATION  Dec. 2012    CARDIAC DEFIBRILLATOR PLACEMENT Left     CRRT-D    CLOSURE-STERNAL WOUND N/A 3/10/2018    Performed by [facility] at [facility] OR 2ND FLR    COLONOSCOPY N/A 3/6/2018    Performed by Alonso Bone MD at [facility] ENDO (2ND FLR)    INSERTION-ICD-BIVENTRICULAR N/A 10/31/2014    [facility] CATH LAB    Insertion-Left Ventricular Assist Device N/A 3/8/2018    Performed by Yg [facility]aufman MD at [facility] OR 2ND FLR    PLACEMENT-NEOPERICARDIUM  3/10/2018    Performed by Yg [facility]man MD at [facility] OR 2ND FLR    REVISION  3/9/2018    Performed by Yg [facility]man MD at [facility] OR 2ND FLR    WASHOUT- MEDIASTINUM  3/9/2018    Performed by Yg Kaufman MD at [facility] OR 2ND FLR       Review of patient's allergies indicates:   Allergen Reactions    Lisinopril Anaphylaxis       Current Facility-Administered  Medications   Medication    0.9%  NaCl infusion (for blood administration)    allopurinol tablet 100 mg    amiodarone tablet 200 mg    [START ON 7/17/2019] ferrous gluconate tablet 324 mg    magnesium oxide tablet 400 mg    [START ON 7/17/2019] pantoprazole injection 40 mg    pantoprazole injection 80 mg    polyethylene glycol (GoLYTELY) solution    potassium chloride SA CR tablet 20 mEq     Current Outpatient Medications   Medication Sig    allopurinol (ZYLOPRIM) 100 MG tablet TAKE 1 TABLET BY MOUTH EVERY DAY    amiodarone (PACERONE) 200 MG Tab TAKE 1 TABLET (200 MG TOTAL) BY MOUTH ONCE DAILY.    amLODIPine (NORVASC) 10 MG tablet Take 1 tablet (10 mg total) by mouth once daily.    aspirin (ECOTRIN) 81 MG EC tablet Take 1 tablet (81 mg total) by mouth once daily.    bumetanide (BUMEX) 2 MG tablet Take 1 tablet (2 mg total) by mouth once daily.    doxazosin (CARDURA) 8 MG Tab Take 1 tablet (8 mg total) by mouth every 12 (twelve) hours.    ferrous gluconate (FERGON) 324 MG tablet Take 1 tablet (324 mg total) by mouth daily with breakfast.    hydrALAZINE (APRESOLINE) 100 MG tablet TAKE 1 TABLET (100 MG TOTAL) BY MOUTH EVERY 8 (EIGHT) HOURS.    magnesium oxide (MAG-OX) 400 mg (241.3 mg magnesium) tablet TAKE 1 TABLET (400 MG TOTAL) BY MOUTH 3 (THREE) TIMES DAILY.    pantoprazole (PROTONIX) 40 MG tablet TAKE 1 TABLET (40 MG TOTAL) BY MOUTH ONCE DAILY.    potassium chloride (K-TAB) 20 mEq Take 1 tablet (20 mEq total) by mouth 2 (two) times daily.    spironolactone (ALDACTONE) 50 MG tablet Take 1 tablet (50 mg total) by mouth once daily.    warfarin (COUMADIN) 5 MG tablet TAKE 1 TABLETS (5MG) BY MOUTH ON TUES, THURS, SAT. TAKE 1.5 TABLETS (7.5MG) ALL OTHER DAYS.    sildenafil (VIAGRA) 100 MG tablet Take 1 tablet (100 mg total) by mouth daily as needed for Erectile Dysfunction.     Family History     Problem Relation (Age of Onset)    Cancer Sister (54)    Coronary artery disease Father    Diabetes  Father    Heart disease Father    Hypertension Father    No Known Problems Mother, Brother        Tobacco Use    Smoking status: Former Smoker     Packs/day: 1.00     Years: 31.00     Pack years: 31.00     Types: Cigarettes     Last attempt to quit: 2018     Years since quittin.5    Smokeless tobacco: Never Used    Tobacco comment: pt is quiting on his own - pt stated not qualified for program;  pt  quit on his own   Substance and Sexual Activity    Alcohol use: No     Alcohol/week: 0.0 oz     Comment: one fifth of gin or rum/week    Drug use: No    Sexual activity: Yes     Partners: Female     Birth control/protection: None     Comment: 10/5/17  with same partner 7 years      Review of Systems   Constitutional: Positive for fatigue. Negative for chills and fever.   Respiratory: Positive for shortness of breath.    Cardiovascular: Negative for leg swelling.   Gastrointestinal: Positive for abdominal pain and blood in stool. Negative for nausea and vomiting.   Neurological: Positive for dizziness and light-headedness. Negative for syncope.     Objective:     Vital Signs (Most Recent):  Temp: 99.3 °F (37.4 °C) (19 1324)  Pulse: 90 (19 1449)  Resp: (!) 26 (19 1449)  BP: (!) 84/0(LVAD patient  84/ palp) (19 1324)  SpO2: 96 % (19 1449) Vital Signs (24h Range):  Temp:  [98.6 °F (37 °C)-99.3 °F (37.4 °C)] 99.3 °F (37.4 °C)  Pulse:  [90-93] 90  Resp:  [18-26] 26  SpO2:  [90 %-100 %] 96 %  BP: (84)/(0) 84/0     Patient Vitals for the past 72 hrs (Last 3 readings):   Weight   19 1219 117.9 kg (260 lb)     Body mass index is 35.26 kg/m².      Intake/Output Summary (Last 24 hours) at 2019 1553  Last data filed at 2019 1415  Gross per 24 hour   Intake 500 ml   Output --   Net 500 ml       Physical Exam   Constitutional: He is oriented to person, place, and time. He appears well-developed and well-nourished.   Neck: Normal range of motion. Neck supple. JVD  (right above clavicle) present.   Cardiovascular: Normal rate and regular rhythm.   Normal VAD hum   Pulmonary/Chest: Effort normal and breath sounds normal. He has no wheezes. He has no rales.   Abdominal: Soft. Bowel sounds are normal. There is no tenderness.   Musculoskeletal: He exhibits no edema.   Neurological: He is alert and oriented to person, place, and time.   Skin: Skin is warm and dry.       Significant Labs:  CBC:  Recent Labs   Lab 07/16/19  1305   WBC 6.61   RBC 2.79*   HGB 7.9*   HCT 25.0*      MCV 90   MCH 28.3   MCHC 31.6*     BNP:  Recent Labs   Lab 07/16/19  1305   BNP 47     CMP:  Recent Labs   Lab 07/16/19  1305   *   CALCIUM 8.9   ALBUMIN 3.2*   PROT 6.0      K 4.5   CO2 24      BUN 28*   CREATININE 2.0*   ALKPHOS 60   ALT 15   AST 22   BILITOT 0.5      Coagulation:   Recent Labs   Lab 07/16/19  1305   INR 2.7*     LDH:  Recent Labs   Lab 07/16/19  1305        Microbiology:  Microbiology Results (last 7 days)     Procedure Component Value Units Date/Time    Stool culture [[id number]]     Order Status:  No result Specimen:  Stool           I have reviewed all pertinent labs within the past 24 hours.    Diagnostic Results:  I have reviewed all pertinent imaging results/findings within the past 24 hours.    Assessment/Plan:     * GIB (gastrointestinal bleeding)  -Acute GIB with hematochezia x 1 day   -Hgb 7.9 on admit, down from 7.5 2 weeks ago  -Type and screen, transfuse 2 units  -Hold home coumadin and ASA for now; will not reverse at this time  -GI consulted- plan for EGD/Cscope tomorrow. Also recommned CT Abd and stool studies  -Protonix 80 mg IV x 1 now then Protonix 40 IV BID  -Hold antiHTNs and Bumex for now in setting of bleed        Anemia  -Acute anemia, due to GIB.  -Hgb 7.9 on admit, down from 14.5 2 weeks ago  -See GIB    LVAD (left ventricular assist device) present  -Heartmate 2, implanted 3/8/18 as DT  -INR therapeutic today, 2.7 with goal 2-3.  Hold coumadin due to GIB  -Hold ASA for now  -MAP goal 60-80,  will hold anti-HTNs for now given active GIB (spironolactone, doxazosin, amlodipine, hydralazine)  -Appears euvolemic, monitor with blood transfusion. On Bumex 4 at home  -Last Echo 1/2019: LVEDD 9.1, LVEF 10%     Procedure: Device Interrogation Including analysis of device parameters  Current Settings: Ventricular Assist Device  Review of device function is stable/unstable stable    TXP LVAD INTERROGATIONS 5/31/2019 5/23/2019 3/21/2019 2/21/2019 12/6/2018 11/7/2018 9/27/2018   Type HeartMate II;HeartMate3 - - - - - -   Flow 6.4 - - - - - -   Speed 9800 - - - - - -   PI 3.7 - - - - - -   Power ([place]) 6.9 - - - - - -   LSL - - - - - - -   Pulsatility No Pulse Pulse No Pulse No Pulse Pulse No Pulse No Pulse         Nely Wilkinson PA-C  Heart Transplant  [facility]

## 2019-07-16 NOTE — CONSULTS
[facility]  Heart Transplant  Consult Note    Patient Name: Tim Richards  MRN: [id number]  Admission Date: 7/16/2019  Hospital Length of Stay: 0 days  Attending Physician: Antonio Hadley Jr.,*  Primary Care Provider: Ja Méndez MD   Principal Problem:<principal problem not specified>    Inpatient consult to Cardiology  Consult performed by: Yohan Rizvi MD  Consult ordered by: Baljeet Barr PA-C        Subjective:     History of Present Illness:  53M with hx of DCM, HBG, CHF stage D s/p HM2 presenting with bloody bowel movements since yesterday evening. He had been doing well at home until yesterday. About 2 hours after eating some leftover beef, he began having abdominal cramping/throbbing/gurgling and subsequently had several large bowel movements. Reports dark brown stool mixed with BRB. No prior hx of GIB. Wife reports he noted some abdominal burning about 1 week ago but pt does not recall. Denies N/V. Reports abdominal cramping pain all over abdomen, worse in lower quadrants L>R.     Here Hgb 14.5 two weeks ago to 7.9 today. Normotensive. Feeling general malaise and mildly short of breath. INR 2.7.      Past Medical History:   Diagnosis Date    CHF (congestive heart failure)     Dilated cardiomyopathy 1/10/2018    Disorder of kidney and ureter     CKD    Gout     HTN (hypertension)     Ventricular tachycardia (paroxysmal)        Past Surgical History:   Procedure Laterality Date    CARDIAC CATHETERIZATION  Dec. 2012    CARDIAC DEFIBRILLATOR PLACEMENT Left     CRRT-D    CLOSURE-STERNAL WOUND N/A 3/10/2018    Performed by Yg Kaufman MD [facility] OR 2ND FLR    COLONOSCOPY N/A 3/6/2018    Performed by Alonso Bone MD at [facility] ENDO (2ND FLR)    INSERTION-ICD-BIVENTRICULAR N/A 10/31/2014    Performed by [facility]iu [facility] at [facility] CATH LAB    Insertion-Left Ventricular Assist Device N/A 3/8/2018    Performed by [facility] at [facility] OR 2ND FLR     PLACEMENT-NEOPERICARDIUM  3/10/2018    [facility] OR 2ND FLR    REVISION  3/9/2018    [facility]formed by [facility] OR 2ND FLR    WASHOUT- MEDIASTINUM  3/9/2018    Performed by [facility] at [facility] OR 2ND FLR       Review of patient's allergies indicates:   Allergen Reactions    Lisinopril Anaphylaxis       Current Facility-Administered Medications   Medication    0.9%  NaCl infusion (for blood administration)    allopurinol tablet 100 mg    amiodarone tablet 200 mg    [START ON 7/17/2019] ferrous gluconate tablet 324 mg    magnesium oxide tablet 400 mg    pantoprazole injection 40 mg    polyethylene glycol (GoLYTELY) solution    potassium chloride SA CR tablet 20 mEq    [START ON 7/17/2019] spironolactone tablet 50 mg     Current Outpatient Medications   Medication Sig    allopurinol (ZYLOPRIM) 100 MG tablet TAKE 1 TABLET BY MOUTH EVERY DAY    amiodarone (PACERONE) 200 MG Tab TAKE 1 TABLET (200 MG TOTAL) BY MOUTH ONCE DAILY.    amLODIPine (NORVASC) 10 MG tablet Take 1 tablet (10 mg total) by mouth once daily.    aspirin (ECOTRIN) 81 MG EC tablet Take 1 tablet (81 mg total) by mouth once daily.    bumetanide (BUMEX) 2 MG tablet Take 1 tablet (2 mg total) by mouth once daily.    doxazosin (CARDURA) 8 MG Tab Take 1 tablet (8 mg total) by mouth every 12 (twelve) hours.    ferrous gluconate (FERGON) 324 MG tablet Take 1 tablet (324 mg total) by mouth daily with breakfast.    hydrALAZINE (APRESOLINE) 100 MG tablet TAKE 1 TABLET (100 MG TOTAL) BY MOUTH EVERY 8 (EIGHT) HOURS.    magnesium oxide (MAG-OX) 400 mg (241.3 mg magnesium) tablet TAKE 1 TABLET (400 MG TOTAL) BY MOUTH 3 (THREE) TIMES DAILY.    pantoprazole (PROTONIX) 40 MG tablet TAKE 1 TABLET (40 MG TOTAL) BY MOUTH ONCE DAILY.    potassium chloride (K-TAB) 20 mEq Take 1 tablet (20 mEq total) by mouth 2 (two) times daily.    spironolactone (ALDACTONE) 50 MG tablet Take 1 tablet (50 mg total) by mouth  once daily.    warfarin (COUMADIN) 5 MG tablet TAKE 1 TABLETS (5MG) BY MOUTH ON TUES, TH, SAT. TAKE 1.5 TABLETS (7.5MG) ALL OTHER DAYS.    sildenafil (VIAGRA) 100 MG tablet Take 1 tablet (100 mg total) by mouth daily as needed for Erectile Dysfunction.     Family History     Problem Relation (Age of Onset)    Cancer Sister (54)    Coronary artery disease Father    Diabetes Father    Heart disease Father    Hypertension Father    No Known Problems Mother, Brother        Tobacco Use    Smoking status: Former Smoker     Packs/day: 1.00     Years: 31.00     Pack years: 31.00     Types: Cigarettes     Last attempt to quit: 2018     Years since quittin.5    Smokeless tobacco: Never Used    Tobacco comment: pt is quiting on his own - pt stated not qualified for program;  pt  quit on his own   Substance and Sexual Activity    Alcohol use: No     Alcohol/week: 0.0 oz     Comment: one fifth of gin or rum/week    Drug use: No    Sexual activity: Yes     Partners: Female     Birth control/protection: None     Comment: 10/5/17  with same partner 7 years      Review of Systems   All other systems reviewed and are negative.    Objective:     Vital Signs (Most Recent):  Temp: 99.3 °F (37.4 °C) (19 1324)  Pulse: 90 (19 1449)  Resp: (!) 26 (19 1449)  BP: (!) 84/0(LVAD patient  84/ palp) (19 1324)  SpO2: 96 % (19 1449) Vital Signs (24h Range):  Temp:  [98.6 °F (37 °C)-99.3 °F (37.4 °C)] 99.3 °F (37.4 °C)  Pulse:  [90-93] 90  Resp:  [18-26] 26  SpO2:  [90 %-100 %] 96 %  BP: (84)/(0) 84/0     Patient Vitals for the past 72 hrs (Last 3 readings):   Weight   19 1219 117.9 kg (260 lb)     Body mass index is 35.26 kg/m².      Intake/Output Summary (Last 24 hours) at 2019 1521  Last data filed at 2019 1415  Gross per 24 hour   Intake 500 ml   Output --   Net 500 ml       Physical Exam   Constitutional: He is oriented to person, place, and time. He appears  well-nourished. No distress.   HENT:   Head: Atraumatic.   Mouth/Throat: No oropharyngeal exudate.   Eyes: EOM are normal.   Neck: Neck supple. No JVD present.   Cardiovascular:   VAD hum   Pulmonary/Chest: Effort normal and breath sounds normal. No respiratory distress.   Abdominal: Soft. Bowel sounds are normal. He exhibits no distension. There is no tenderness. There is guarding. There is no rebound.   Musculoskeletal: He exhibits no edema.   Neurological: He is alert and oriented to person, place, and time. No cranial nerve deficit.   Skin: Skin is warm and dry. Capillary refill takes less than 2 seconds. No erythema.   Psychiatric: He has a normal mood and affect.       Significant Labs:  CBC:  Recent Labs   Lab 07/16/19  1305   WBC 6.61   RBC 2.79*   HGB 7.9*   HCT 25.0*      MCV 90   MCH 28.3   MCHC 31.6*     BNP:  Recent Labs   Lab 07/16/19  1305   BNP 47     CMP:  Recent Labs   Lab 07/16/19  1305   *   CALCIUM 8.9   ALBUMIN 3.2*   PROT 6.0      K 4.5   CO2 24      BUN 28*   CREATININE 2.0*   ALKPHOS 60   ALT 15   AST 22   BILITOT 0.5      Coagulation:   Recent Labs   Lab 07/16/19  1305   INR 2.7*     LDH:  Recent Labs   Lab 07/16/19  1305        Microbiology:  Microbiology Results (last 7 days)     ** No results found for the last 168 hours. **          I have reviewed all pertinent labs within the past 24 hours.      Assessment/Plan:   53M with hx of NICM, stage D CHF s/p HM II LVAD as DT presenting with acute GIB. Hgb drop from 14.5 to 7.9 over two weeks.     GIB (gastrointestinal bleeding)  -Acute GIB with hematochezia x 1 day, Hgb 14.5 to 7.9 over 2 weeks  -Type and screen, transfuse 2 units  -GI consulted, NPO  -Hold home coumadin, ASA for now; will not reverse at this time  -Admit to ICU with HTS service      Thank you for your consult. Admitted to ICU under HTS care. Handoff provided to provider assuming care. Discussed with staff Dr. Leonie Rizvi,  MD  Heart Transplant  [facility]

## 2019-07-16 NOTE — ED NOTES
Spoke with Robyn, VAD Coordinator about patient's wife's concerns - Robyn states she will send someone to evaluate shortly

## 2019-07-16 NOTE — SIGNIFICANT EVENT
Notified that patient was pale and diaphoretic on the toilet, having a large, bloody BM. Upon my arrival the patient is lying in bed, comfortable, reports no change in symptoms from admission. Vitals are stable, first unit of blood is nearly finished transfusing.    Vitals:    07/16/19 1625   BP: (!) 74/0   Pulse: 92   Resp: 20   Temp: 99.6 °F (37.6 °C)     Continue transfusion  Follow-up hemoglobin 2 hours after final unit    Hardeep Wallace MD  PGY-V

## 2019-07-16 NOTE — ED TRIAGE NOTES
Pt states at 02:00 am today he went to bathroom and became dizzy with numbness and tingling in lips and fingers  Pt complains of shortness of breath when he walked  Pt complains of pain over lower left ribs rating pain a 7/10

## 2019-07-16 NOTE — ED NOTES
Pt identifiers Tim Richards were checked and are  correct  LOC: The patient is awake, alert, aware of environment with an appropriate affect. Oriented x4, speaking appropriately  APPEARANCE: Pt rates left lower rib pain a 7/10 , in no acute distress, pt is clean and well groomed, clothing properly fastened  SKIN: Skin warm, dry and intact, normal skin turgor, moist mucus membranes  Right side abd dressing around drive line site intact   RESPIRATORY: Airway is open and patent, respirations are spontaneous, even and unlabored, normal effort and rate Breath sounds clear jaleel to all lung fields on auscultation  CARDIAC: Normal rate and rhythm, no peripheral edema noted,  ABDOMEN: Soft, nontender, nondistended. Bowel sounds present to all four quad on auscultation  NEUROLOGIC: PERRL, facial expression is symmetrical, patient moving all extremities spontaneously, normal sensation in all extremities when touched with a finger.  Follows all commands appropriately  MUSCULOSKELETAL: No obvious deformities.

## 2019-07-16 NOTE — ED PROVIDER NOTES
"Encounter Date: 7/16/2019       History     Chief Complaint   Patient presents with    Shortness of Breath     yesterday possibly ate bad meat. Pt has been having diahhrea and dizzines with bright red blood in the stool. Pt denies vomiting. LVAD pt.      The patient, who has a past medical history significant for cardiomyopathy and LVAD, presents to the ER for an emergent evaluation due to bloody diarrhea, generalized weakness, dizziness, and SOB. He states that shortly after eating "bad meat" yesterday, his stomach began "gurgling" and then he started having frequent bouts of bloody diarrhea. He states that the diarrhea was red blood streaked. He states that he has felt generally weak and lightheaded ever since. He denies any chest pain. He denies any nausea or vomiting. He denies any syncope.  The patient's wife states that she feels that his upper abdomen looks and feels more prominent that usual, and that she is worried that the LVAD may have shifted or migrated.         Review of patient's allergies indicates:   Allergen Reactions    Lisinopril Anaphylaxis     Past Medical History:   Diagnosis Date    CHF (congestive heart failure)     Dilated cardiomyopathy 1/10/2018    Disorder of kidney and ureter     CKD    Gout     HTN (hypertension)     Ventricular tachycardia (paroxysmal)      Past Surgical History:   Procedure Laterality Date    CARDIAC CATHETERIZATION  Dec. 2012    CARDIAC DEFIBRILLATOR PLACEMENT Left     CRRT-D    CLOSURE-STERNAL WOUND N/A 3/10/2018    Performed by [facility] at [facility] OR 2ND FLR    COLONOSCOPY N/A 3/6/2018    Performed by Alonso Bone MD at [facility] ENDO (2ND FLR)    INSERTION-ICD-BIVENTRICULAR N/A 10/31/2014    Performed by [facility]ader Chiu [facility] at [facility] CATH LAB    Insertion-Left Ventricular Assist Device N/A 3/8/2018    Performed by [facility] at [facility] OR 2ND FLR    PLACEMENT-NEOPERICARDIUM  3/10/2018    Performed by [facility] at [facility] OR 2ND FLR    " REVISION  3/9/2018    [facility] at [facility] 2ND FLR    WASHOUT- MEDIASTINUM  3/9/2018    Performed by [facility] at [facility] OR 2ND FLR     Family History   Problem Relation Age of Onset    Hypertension Father     Diabetes Father     Coronary artery disease Father     Heart disease Father         CHF    No Known Problems Mother     Cancer Sister 54        breast CA    No Known Problems Brother      Social History     Tobacco Use    Smoking status: Former Smoker     Packs/day: 1.00     Years: 31.00     Pack years: 31.00     Types: Cigarettes     Last attempt to quit: 2018     Years since quittin.5    Smokeless tobacco: Never Used    Tobacco comment: pt is quiting on his own - pt stated not qualified for program;  pt  quit on his own   Substance Use Topics    Alcohol use: No     Alcohol/week: 0.0 oz     Comment: one fifth of gin or rum/week    Drug use: No     Review of Systems   Constitutional: Negative for chills and fever.   HENT: Negative for nosebleeds.    Eyes: Negative for visual disturbance.   Respiratory: Positive for shortness of breath.    Cardiovascular: Negative for chest pain.   Gastrointestinal: Positive for blood in stool and diarrhea. Negative for abdominal distention, abdominal pain, nausea and vomiting.   Genitourinary: Negative for difficulty urinating, dysuria and hematuria.   Musculoskeletal: Negative for arthralgias, gait problem and neck pain.   Skin: Negative for color change and rash.   Neurological: Positive for dizziness and light-headedness. Negative for seizures, syncope, facial asymmetry, speech difficulty, numbness and headaches.   Psychiatric/Behavioral: Negative for confusion.       Physical Exam     Initial Vitals   BP Pulse Resp Temp SpO2   19 1324 19 1324 19 1219 19 1219 19 1219   (!) 84/0 93 18 98.6 °F (37 °C) (!) 90 %      MAP       --                Physical Exam    Nursing note and vitals  reviewed.  Constitutional: He appears well-developed and well-nourished. He is not diaphoretic.  Non-toxic appearance. He appears distressed.   HENT:   Head: Normocephalic and atraumatic.   Mouth/Throat: Oropharynx is clear and moist.   Eyes: Pupils are equal, round, and reactive to light.   Neck: Neck supple.   Cardiovascular:   LVAD   Pulmonary/Chest: Effort normal and breath sounds normal. No accessory muscle usage. No tachypnea. No respiratory distress. He exhibits no tenderness.   Abdominal: Soft. There is no tenderness. There is no rebound and no guarding.   Genitourinary: Rectal exam shows guaiac positive stool. Guaiac positive stool.   Genitourinary Comments: Hematochezia present.   Musculoskeletal:        Right lower leg: Normal. He exhibits no tenderness.        Left lower leg: Normal. He exhibits no tenderness.   Neurological: He is alert and oriented to person, place, and time.   Skin: Skin is warm and dry. No rash noted.   Psychiatric: He has a normal mood and affect.         ED Course   Critical Care  Date/Time: 7/16/2019 2:42 PM  Performed by: Liang Eaton DO  Authorized by: Liang Eaton DO   Direct patient critical care time: 10 minutes  Additional history critical care time: 5 minutes  Ordering / reviewing critical care time: 10 minutes  Documentation critical care time: 5 minutes  Consulting other physicians critical care time: 5 minutes  Consult with family critical care time: 5 minutes  Other critical care time: 0 minutes  Total critical care time (exclusive of procedural time) : 40 minutes  Critical care time was exclusive of separately billable procedures and treating other patients and teaching time.  Critical care was necessary to treat or prevent imminent or life-threatening deterioration of the following conditions: cardiac failure and circulatory failure.  Critical care was time spent personally by me on the following activities: blood draw for specimens, development of treatment  plan with patient or surrogate, discussions with consultants, interpretation of cardiac output measurements, obtaining history from patient or surrogate, ordering and review of radiographic studies, review of old charts, re-evaluation of patient's condition, ordering and review of laboratory studies, examination of patient, evaluation of patient's response to treatment and ordering and performing treatments and interventions.  Subsequent provider of critical care: I assumed direction of critical care for this patient from another provider of my specialty.  Comments: Critical care was performed directly on this patient by the ER attending physician, Dr Eaton.         Labs Reviewed   CBC W/ AUTO DIFFERENTIAL - Abnormal; Notable for the following components:       Result Value    RBC 2.79 (*)     Hemoglobin 7.9 (*)     Hematocrit 25.0 (*)     Mean Corpuscular Hemoglobin Conc 31.6 (*)     RDW 15.2 (*)     Lymph # 0.9 (*)     Gran% 73.9 (*)     Lymph% 13.5 (*)     All other components within normal limits   COMPREHENSIVE METABOLIC PANEL - Abnormal; Notable for the following components:    Glucose 112 (*)     BUN, Bld 28 (*)     Creatinine 2.0 (*)     Albumin 3.2 (*)     eGFR if  43.1 (*)     eGFR if non  37.3 (*)     All other components within normal limits   PROTIME-INR - Abnormal; Notable for the following components:    Prothrombin Time 26.1 (*)     INR 2.7 (*)     All other components within normal limits   TROPONIN I - Abnormal; Notable for the following components:    Troponin I 0.173 (*)     All other components within normal limits   ISTAT PROCEDURE - Abnormal; Notable for the following components:    POC PO2 34 (*)     POC HCO3 22.3 (*)     POC SATURATED O2 65 (*)     POC TCO2 23 (*)     All other components within normal limits   CK   LACTATE DEHYDROGENASE   B-TYPE NATRIURETIC PEPTIDE   TYPE & SCREEN   PREPARE RBC SOFT     Results for orders placed or performed during the  hospital encounter of 07/16/19   CBC auto differential   Result Value Ref Range    WBC 6.61 3.90 - 12.70 K/uL    RBC 2.79 (L) 4.60 - 6.20 M/uL    Hemoglobin 7.9 (L) 14.0 - 18.0 g/dL    Hematocrit 25.0 (L) 40.0 - 54.0 %    Mean Corpuscular Volume 90 82 - 98 fL    Mean Corpuscular Hemoglobin 28.3 27.0 - 31.0 pg    Mean Corpuscular Hemoglobin Conc 31.6 (L) 32.0 - 36.0 g/dL    RDW 15.2 (H) 11.5 - 14.5 %    Platelets 202 150 - 350 K/uL    MPV 10.4 9.2 - 12.9 fL    Immature Granulocytes 0.3 0.0 - 0.5 %    Gran # (ANC) 4.9 1.8 - 7.7 K/uL    Immature Grans (Abs) 0.02 0.00 - 0.04 K/uL    Lymph # 0.9 (L) 1.0 - 4.8 K/uL    Mono # 0.7 0.3 - 1.0 K/uL    Eos # 0.0 0.0 - 0.5 K/uL    Baso # 0.03 0.00 - 0.20 K/uL    nRBC 0 0 /100 WBC    Gran% 73.9 (H) 38.0 - 73.0 %    Lymph% 13.5 (L) 18.0 - 48.0 %    Mono% 11.2 4.0 - 15.0 %    Eosinophil% 0.6 0.0 - 8.0 %    Basophil% 0.5 0.0 - 1.9 %    Differential Method Automated    Comprehensive metabolic panel   Result Value Ref Range    Sodium 136 136 - 145 mmol/L    Potassium 4.5 3.5 - 5.1 mmol/L    Chloride 104 95 - 110 mmol/L    CO2 24 23 - 29 mmol/L    Glucose 112 (H) 70 - 110 mg/dL    BUN, Bld 28 (H) 6 - 20 mg/dL    Creatinine 2.0 (H) 0.5 - 1.4 mg/dL    Calcium 8.9 8.7 - 10.5 mg/dL    Total Protein 6.0 6.0 - 8.4 g/dL    Albumin 3.2 (L) 3.5 - 5.2 g/dL    Total Bilirubin 0.5 0.1 - 1.0 mg/dL    Alkaline Phosphatase 60 55 - 135 U/L    AST 22 10 - 40 U/L    ALT 15 10 - 44 U/L    Anion Gap 8 8 - 16 mmol/L    eGFR if African American 43.1 (A) >60 mL/min/1.73 m^2    eGFR if non  37.3 (A) >60 mL/min/1.73 m^2   Protime-INR   Result Value Ref Range    Prothrombin Time 26.1 (H) 9.0 - 12.5 sec    INR 2.7 (H) 0.8 - 1.2   CPK   Result Value Ref Range    CPK 82 20 - 200 U/L   Lactate dehydrogenase   Result Value Ref Range     110 - 260 U/L   Troponin I   Result Value Ref Range    Troponin I 0.173 (H) 0.000 - 0.026 ng/mL   Brain natriuretic peptide   Result Value Ref Range    BNP 47 0  - 99 pg/mL   ISTAT PROCEDURE   Result Value Ref Range    POC PH 7.386 7.35 - 7.45    POC PCO2 37.2 35 - 45 mmHg    POC PO2 34 (LL) 40 - 60 mmHg    POC HCO3 22.3 (L) 24 - 28 mmol/L    POC BE -3 -2 to 2 mmol/L    POC SATURATED O2 65 (L) 95 - 100 %    POC TCO2 23 (L) 24 - 29 mmol/L    Sample VENOUS     Site Other     Allens Test N/A        EKG Readings: (Independently Interpreted)   Initial Reading: No STEMI. Rhythm: Normal Sinus Rhythm. Heart Rate: 95.   ER physician reviewed        Imaging Results          X-Ray Chest PA And Lateral (Final result)  Result time 07/16/19 14:07:41    Final result by Casper Randle MD (07/16/19 14:07:41)                 Impression:      Continued demonstration of significant cardiomegaly.  No significant detrimental interval change in the appearance of the chest since 07/07/2018, with some improvement since that time as noted above.      Electronically signed by: Casper Randle MD  Date:    07/16/2019  Time:    14:07             Narrative:    EXAMINATION:  XR CHEST PA AND LATERAL    TECHNIQUE:  Two views of the chest were obtained, with PA and lateral projections submitted.    COMPARISON:  Comparison is made to 07/07/2018.    FINDINGS:  Postoperative changes are again noted in the thorax, as are a cardiac pacing device and a left ventricular assist device.  Cardiomediastinal silhouette is again noted to be markedly prominent, representing cardiomegaly and/or pericardial fluid, but the degree of cardiomegaly and the appearance of the cardiomediastinal silhouette have not changed appreciably since the examination referenced above.  Pulmonary vascularity is normal, and the lung zones appear essentially clear, free of significant airspace consolidation or volume loss.  The accentuated interstitial markings seen bilaterally on 07/07/2018 are not observed on the current exam.  No pleural fluid of any substantial volume is noted on either side.  No pneumothorax.                               X-Ray  Abdomen Flat And Erect (Final result)  Result time 07/16/19 14:07:40    Final result by Nando Osorio MD (07/16/19 14:07:40)                 Impression:      Normal bowel gas pattern.      Electronically signed by: Nando Osorio  Date:    07/16/2019  Time:    14:07             Narrative:    EXAMINATION:  XR ABDOMEN FLAT AND ERECT    CLINICAL HISTORY:  Gastrointestinal hemorrhage, unspecified    TECHNIQUE:  Flat and erect AP views of the abdomen were performed.    COMPARISON:  07/16/2019 at 13:37 chest film.    FINDINGS:  Four images presented with upright and supine positioning.    There is a left ventricular assist device.  There is cardiac pacing leads.  There is overlying leads.  There is electronic appliance overlying the right low abdominal margin.  Visualized heart appears enlarged unchanged.    Bowel gas pattern appears normal.  No pneumatosis or free air.  No calculus or fracture.                                 Medical Decision Making:   History:   Old Medical Records: I decided to obtain old medical records.  Initial Assessment:   LVAD pt here c/o bloody diarrhea after suspected food poisoning yesterday. C/o increasing generalized weakness and SOB.    Differential Diagnosis:   Anemia, GI Bleeding, Infection, Dysrhythmia, Electrolyte abnormality, Colitis, Coagulopathy, etc   Clinical Tests:   Lab Tests: Ordered and Reviewed  Radiological Study: Ordered and Reviewed  Medical Tests: Ordered and Reviewed  ED Management:  I discussed the case in detail with the ER attending physician, who also examined the patient.   Substantial drop in H & H noted. Type and screen ordered   Case discussed in detail with cardiologist, who examined the patient in the ER and recommended ICU admission.   GI consulted and case discussed in the ER   Pt aware of results, diagnosis, and plan        Additional MDM:   EKG: I have independently interpreted EKG(s) - see notes.   X-Rays: I have independently interpreted X-Ray(s) - see  notes.            Attending Attestation:     Physician Attestation Statement for NP/PA:   I have conducted a face to face encounter with this patient in addition to the NP/PA, due to Medical Complexity          I have reviewed and concur with the advanced practice clinician's history, physical, assessment, and plan.  I have personally interviewed and examined the patient at bedside.  See below addendum for my evaluation and additional findings.    Briefly,  this is a 53 y.o. male with a history of cardiomyopathy and LVAD presenting with bloody diarrhea, weakness, dizziness and shortness of breath. He is afebrile, hypoxemic with hypotension.  Physical exam findings with positive hematochezia and grossly positives stool.  He has a positive LVAD hum, with clear lung sounds and normal breathing.  He appears distressed.  Laboratory findings with significant anemia.  ECG with no ischemia, no STEMI on my read.  Chest x-ray with significant cardiomegaly, but no acute pneumothorax or acute findings on my read.  Hemoglobin dropped approximately 7-8 points, and patient is currently anticoagulated.  Discussed case with ICU cardiology team, who will admit to the CCU for ongoing treatment and management.  Discussed case with Gastroenterology, who will also follow along and discuss intervention.  Please see critical care note for critical care time above.  Patient agreeable with admission plan.    Complex:  Critical      Liang Eaton DO    Dept of Emergency Medicine   [facility]  Spectralink: [id number]             Clinical Impression:       ICD-10-CM ICD-9-CM   1. Anemia, unspecified type D64.9 285.9   2. Shortness of breath R06.02 786.05   3. SOB (shortness of breath) R06.02 786.05   4. GI bleeding K92.2 578.9   5. LVAD (left ventricular assist device) present Z95.811 V43.21   6. Elevated troponin R74.8 790.6   7. Bloody diarrhea R19.7 787.91   8. Complication involving left ventricular assist device  (LVAD), initial encounter T82.9XXA 996.72         Disposition:   Disposition: Admitted  Condition: Serious                        Baljeet Barr PA-C  07/16/19 [id number]       Liang Eaton DO  07/22/19 [id number]

## 2019-07-16 NOTE — TREATMENT PLAN
GI Treatment Plan  07/16/2019  6:01 PM    Called by HTS fellow that while 1st unit was infusing patient had an episode of hematochezia. VS remained stable and patient felt OK. Plan is to infuse 2nd unit and then get a repeat H/H. If ongoing/overt bleeding can notify GI fellow on-call.    Bhupendra Montague M.D.  Gastroenterology Fellow, PGY-VI  Pager: [phone]  [facility]

## 2019-07-16 NOTE — ASSESSMENT & PLAN NOTE
-Heartmate 2, implanted 3/8/18 as DT  -INR goal 2-3, hold coumadin due to GIB  -Hold ASA for now  -MAP goal 60-80, 84 currently, will hold for now given active GIB, spirono 25 (also on amlodipine 10, hydralazine 100 tid)  -GDMT: no ACE/ARB due to CKD; on spironolactone  -Appears euvolemic, monitor with blood transfusion     Procedure: Device Interrogation Including analysis of device parameters  Current Settings: Ventricular Assist Device  Review of device function is stable/unstable stable    TXP LVAD INTERROGATIONS 5/31/2019 5/23/2019 3/21/2019 2/21/2019 12/6/2018 11/7/2018 9/27/2018   Type HeartMate II;HeartMate3 - - - - - -   Flow 6.4 - - - - - -   Speed 9800 - - - - - -   PI 3.7 - - - - - -   Power (Jackson) 6.9 - - - - - -   LSL - - - - - - -   Pulsatility No Pulse Pulse No Pulse No Pulse Pulse No Pulse No Pulse

## 2019-07-16 NOTE — ASSESSMENT & PLAN NOTE
-Acute GIB with hematochezia x 1 day   -Hgb 7.9 on admit, down from 7.5 2 weeks ago  -Type and screen, transfuse 2 units  -Hold home coumadin and ASA for now; will not reverse at this time  -GI consulted- plan for EGD/Cscope tomorrow. Also recommned CT Abd and stool studies  -Protonix 80 mg IV x 1 now then Protonix 40 IV BID  -Hold antiHTNs and Bumex for now in setting of bleed

## 2019-07-16 NOTE — ASSESSMENT & PLAN NOTE
-Heartmate 2, implanted 3/8/18 as DT  -INR therapeutic today, 2.7 with goal 2-3. Hold coumadin due to GIB  -Hold ASA for now  -MAP goal 60-80,  will hold anti-HTNs for now given active GIB (spironolactone, doxazosin, amlodipine, hydralazine)  -Appears euvolemic, monitor with blood transfusion. On Bumex 4 at home  -Last Echo 1/2019: LVEDD 9.1, LVEF 10%     Procedure: Device Interrogation Including analysis of device parameters  Current Settings: Ventricular Assist Device  Review of device function is stable/unstable stable    TXP LVAD INTERROGATIONS 5/31/2019 5/23/2019 3/21/2019 2/21/2019 12/6/2018 11/7/2018 9/27/2018   Type HeartMate II;HeartMate3 - - - - - -   Flow 6.4 - - - - - -   Speed 9800 - - - - - -   PI 3.7 - - - - - -   Power (Jackson) 6.9 - - - - - -   LSL - - - - - - -   Pulsatility No Pulse Pulse No Pulse No Pulse Pulse No Pulse No Pulse

## 2019-07-16 NOTE — SUBJECTIVE & OBJECTIVE
Past Medical History:   Diagnosis Date    CHF (congestive heart failure)     Dilated cardiomyopathy 1/10/2018    Disorder of kidney and ureter     CKD    Gout     HTN (hypertension)     Ventricular tachycardia (paroxysmal)        Past Surgical History:   Procedure Laterality Date    CARDIAC CATHETERIZATION  Dec. 2012    CARDIAC DEFIBRILLATOR PLACEMENT Left     CRRT-D    CLOSURE-STERNAL WOUND N/A 3/10/2018    [facility] at [facility] OR 2ND FLR    COLONOSCOPY N/A 3/6/2018    Performed by Alonso Bone MD at [facility] ENDO (2ND FLR)    INSERTION-ICD-BIVENTRICULAR N/A 10/31/2014    [facility] at [facility] CATH LAB    Insertion-Left Ventricular Assist Device N/A 3/8/2018    [facility] at [facility] OR 2ND FLR    PLACEMENT-NEOPERICARDIUM  3/10/2018    [facility] at [facility] OR 2ND FLR    REVISION  3/9/2018    [facility] at [facility] OR 2ND FLR    WASHOUT- MEDIASTINUM  3/9/2018    Performed by [facility] at [facility] OR 2ND FLR       Review of patient's allergies indicates:   Allergen Reactions    Lisinopril Anaphylaxis       Current Facility-Administered Medications   Medication    0.9%  NaCl infusion (for blood administration)    allopurinol tablet 100 mg    amiodarone tablet 200 mg    [START ON 7/17/2019] ferrous gluconate tablet 324 mg    magnesium oxide tablet 400 mg    pantoprazole injection 40 mg    polyethylene glycol (GoLYTELY) solution    potassium chloride SA CR tablet 20 mEq    [START ON 7/17/2019] spironolactone tablet 50 mg     Current Outpatient Medications   Medication Sig    allopurinol (ZYLOPRIM) 100 MG tablet TAKE 1 TABLET BY MOUTH EVERY DAY    amiodarone (PACERONE) 200 MG Tab TAKE 1 TABLET (200 MG TOTAL) BY MOUTH ONCE DAILY.    amLODIPine (NORVASC) 10 MG tablet Take 1 tablet (10 mg total) by mouth once daily.    aspirin (ECOTRIN) 81 MG EC tablet Take 1 tablet (81 mg total) by mouth once daily.     bumetanide (BUMEX) 2 MG tablet Take 1 tablet (2 mg total) by mouth once daily.    doxazosin (CARDURA) 8 MG Tab Take 1 tablet (8 mg total) by mouth every 12 (twelve) hours.    ferrous gluconate (FERGON) 324 MG tablet Take 1 tablet (324 mg total) by mouth daily with breakfast.    hydrALAZINE (APRESOLINE) 100 MG tablet TAKE 1 TABLET (100 MG TOTAL) BY MOUTH EVERY 8 (EIGHT) HOURS.    magnesium oxide (MAG-OX) 400 mg (241.3 mg magnesium) tablet TAKE 1 TABLET (400 MG TOTAL) BY MOUTH 3 (THREE) TIMES DAILY.    pantoprazole (PROTONIX) 40 MG tablet TAKE 1 TABLET (40 MG TOTAL) BY MOUTH ONCE DAILY.    potassium chloride (K-TAB) 20 mEq Take 1 tablet (20 mEq total) by mouth 2 (two) times daily.    spironolactone (ALDACTONE) 50 MG tablet Take 1 tablet (50 mg total) by mouth once daily.    warfarin (COUMADIN) 5 MG tablet TAKE 1 TABLETS (5MG) BY MOUTH ON TUES, THURS, SAT. TAKE 1.5 TABLETS (7.5MG) ALL OTHER DAYS.    sildenafil (VIAGRA) 100 MG tablet Take 1 tablet (100 mg total) by mouth daily as needed for Erectile Dysfunction.     Family History     Problem Relation (Age of Onset)    Cancer Sister (54)    Coronary artery disease Father    Diabetes Father    Heart disease Father    Hypertension Father    No Known Problems Mother, Brother        Tobacco Use    Smoking status: Former Smoker     Packs/day: 1.00     Years: 31.00     Pack years: 31.00     Types: Cigarettes     Last attempt to quit: 2018     Years since quittin.5    Smokeless tobacco: Never Used    Tobacco comment: pt is quiting on his own - pt stated not qualified for program;  pt  quit on his own   Substance and Sexual Activity    Alcohol use: No     Alcohol/week: 0.0 oz     Comment: one fifth of gin or rum/week    Drug use: No    Sexual activity: Yes     Partners: Female     Birth control/protection: None     Comment: 10/5/17  with same partner 7 years      Review of Systems   All other systems reviewed and are negative.    Objective:      Vital Signs (Most Recent):  Temp: 99.3 °F (37.4 °C) (07/16/19 1324)  Pulse: 90 (07/16/19 1449)  Resp: (!) 26 (07/16/19 1449)  BP: (!) 84/0(LVAD patient  84/ palp) (07/16/19 1324)  SpO2: 96 % (07/16/19 1449) Vital Signs (24h Range):  Temp:  [98.6 °F (37 °C)-99.3 °F (37.4 °C)] 99.3 °F (37.4 °C)  Pulse:  [90-93] 90  Resp:  [18-26] 26  SpO2:  [90 %-100 %] 96 %  BP: (84)/(0) 84/0     Patient Vitals for the past 72 hrs (Last 3 readings):   Weight   07/16/19 1219 117.9 kg (260 lb)     Body mass index is 35.26 kg/m².      Intake/Output Summary (Last 24 hours) at 7/16/2019 1521  Last data filed at 7/16/2019 1415  Gross per 24 hour   Intake 500 ml   Output --   Net 500 ml       Physical Exam   Constitutional: He is oriented to person, place, and time. He appears well-nourished. No distress.   HENT:   Head: Atraumatic.   Mouth/Throat: No oropharyngeal exudate.   Eyes: EOM are normal.   Neck: Neck supple. No JVD present.   Cardiovascular:   VAD hum   Pulmonary/Chest: Effort normal and breath sounds normal. No respiratory distress.   Abdominal: Soft. Bowel sounds are normal. He exhibits no distension. There is no tenderness. There is guarding. There is no rebound.   Musculoskeletal: He exhibits no edema.   Neurological: He is alert and oriented to person, place, and time. No cranial nerve deficit.   Skin: Skin is warm and dry. Capillary refill takes less than 2 seconds. No erythema.   Psychiatric: He has a normal mood and affect.       Significant Labs:  CBC:  Recent Labs   Lab 07/16/19  1305   WBC 6.61   RBC 2.79*   HGB 7.9*   HCT 25.0*      MCV 90   MCH 28.3   MCHC 31.6*     BNP:  Recent Labs   Lab 07/16/19  1305   BNP 47     CMP:  Recent Labs   Lab 07/16/19  1305   *   CALCIUM 8.9   ALBUMIN 3.2*   PROT 6.0      K 4.5   CO2 24      BUN 28*   CREATININE 2.0*   ALKPHOS 60   ALT 15   AST 22   BILITOT 0.5      Coagulation:   Recent Labs   Lab 07/16/19  1305   INR 2.7*     LDH:  Recent Labs   Lab  07/16/19  1305        Microbiology:  Microbiology Results (last 7 days)     ** No results found for the last 168 hours. **          I have reviewed all pertinent labs within the past 24 hours.

## 2019-07-16 NOTE — SUBJECTIVE & OBJECTIVE
Past Medical History:   Diagnosis Date    CHF (congestive heart failure)     Dilated cardiomyopathy 1/10/2018    Disorder of kidney and ureter     CKD    Gout     HTN (hypertension)     Ventricular tachycardia (paroxysmal)        Past Surgical History:   Procedure Laterality Date    CARDIAC CATHETERIZATION  Dec. 2012    CARDIAC DEFIBRILLATOR PLACEMENT Left     CRRT-D    CLOSURE-STERNAL WOUND N/A 3/10/2018    [facility] OR 2ND FLR    COLONOSCOPY N/A 3/6/2018    Performed by Alonso Bone MD at [facility] ENDO (2ND FLR)    INSERTION-ICD-BIVENTRICULAR N/A 10/31/2014    Per[facility] by [facility] at [facility] CATH LAB    Insertion-Left Ventricular Assist Device N/A 3/8/2018    Performed by [facility] at [facility] OR 2ND FLR    PLACEMENT-NEOPERICARDIUM  3/10/2018    Performed by [facility] at [facility] OR 2ND FLR    REVISION  3/9/2018    Performed by [facility] at [facility] OR 2ND FLR    WASHOUT- MEDIASTINUM  3/9/2018    Performed by Yg [facility]man MD at [facility] OR 2ND FLR       Review of patient's allergies indicates:   Allergen Reactions    Lisinopril Anaphylaxis       No current facility-administered medications on file prior to encounter.      Current Outpatient Medications on File Prior to Encounter   Medication Sig    allopurinol (ZYLOPRIM) 100 MG tablet TAKE 1 TABLET BY MOUTH EVERY DAY    amiodarone (PACERONE) 200 MG Tab TAKE 1 TABLET (200 MG TOTAL) BY MOUTH ONCE DAILY.    amLODIPine (NORVASC) 10 MG tablet Take 1 tablet (10 mg total) by mouth once daily.    aspirin (ECOTRIN) 81 MG EC tablet Take 1 tablet (81 mg total) by mouth once daily.    bumetanide (BUMEX) 2 MG tablet Take 1 tablet (2 mg total) by mouth once daily.    doxazosin (CARDURA) 8 MG Tab Take 1 tablet (8 mg total) by mouth every 12 (twelve) hours.    ferrous gluconate (FERGON) 324 MG tablet Take 1 tablet (324 mg total) by mouth daily with breakfast.    hydrALAZINE (APRESOLINE) 100 MG tablet TAKE 1  TABLET (100 MG TOTAL) BY MOUTH EVERY 8 (EIGHT) HOURS.    magnesium oxide (MAG-OX) 400 mg (241.3 mg magnesium) tablet TAKE 1 TABLET (400 MG TOTAL) BY MOUTH 3 (THREE) TIMES DAILY.    pantoprazole (PROTONIX) 40 MG tablet TAKE 1 TABLET (40 MG TOTAL) BY MOUTH ONCE DAILY.    potassium chloride (K-TAB) 20 mEq Take 1 tablet (20 mEq total) by mouth 2 (two) times daily.    spironolactone (ALDACTONE) 50 MG tablet Take 1 tablet (50 mg total) by mouth once daily.    warfarin (COUMADIN) 5 MG tablet TAKE 1 TABLETS (5MG) BY MOUTH ON TUES, THURS, SAT. TAKE 1.5 TABLETS (7.5MG) ALL OTHER DAYS.    sildenafil (VIAGRA) 100 MG tablet Take 1 tablet (100 mg total) by mouth daily as needed for Erectile Dysfunction.     Family History     Problem Relation (Age of Onset)    Cancer Sister (54)    Coronary artery disease Father    Diabetes Father    Heart disease Father    Hypertension Father    No Known Problems Mother, Brother        Tobacco Use    Smoking status: Former Smoker     Packs/day: 1.00     Years: 31.00     Pack years: 31.00     Types: Cigarettes     Last attempt to quit: 2018     Years since quittin.5    Smokeless tobacco: Never Used    Tobacco comment: pt is quiting on his own - pt stated not qualified for program;  pt  quit on his own   Substance and Sexual Activity    Alcohol use: No     Alcohol/week: 0.0 oz     Comment: one fifth of gin or rum/week    Drug use: No    Sexual activity: Yes     Partners: Female     Birth control/protection: None     Comment: 10/5/17  with same partner 7 years      ROS  Objective:     Vital Signs (Most Recent):  Temp: 99.3 °F (37.4 °C) (19 1324)  Pulse: 90 (19 1449)  Resp: (!) 26 (19 1449)  BP: (!) 84/0(LVAD patient  84/ palp) (19 1324)  SpO2: 96 % (19 1449) Vital Signs (24h Range):  Temp:  [98.6 °F (37 °C)-99.3 °F (37.4 °C)] 99.3 °F (37.4 °C)  Pulse:  [90-93] 90  Resp:  [18-26] 26  SpO2:  [90 %-100 %] 96 %  BP: (84)/(0) 84/0     Weight:  117.9 kg (260 lb)  Body mass index is 35.26 kg/m².    SpO2: 96 %  O2 Device (Oxygen Therapy): room air      Intake/Output Summary (Last 24 hours) at 2019 1520  Last data filed at 2019 1415  Gross per 24 hour   Intake 500 ml   Output --   Net 500 ml       Lines/Drains/Airways     Line                 VAD 18 1124 Left ventricular assist device HeartMate  days          Peripheral Intravenous Line                 Peripheral IV - Single Lumen 19 1305 18 G;1  in Right Forearm less than 1 day                Physical Exam    Significant Labs: {Labs:80302}    Significant Imaging: {Imagin}

## 2019-07-16 NOTE — ASSESSMENT & PLAN NOTE
-Acute GIB with hematochezia x 1 day, Hgb 14.5 to 7.9 over 2 weeks  -Type and screen, transfuse 2 units  -GI consulted, NPO  -Hold home coumadin, ASA for now; will not reverse at this time  -Admit to ICU with HTS service
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
## 2019-07-16 NOTE — CONSULTS
"[facility]  Gastroenterology  Consult Note    Patient Name: Tim Richards  MRN: [id number]  Admission Date: 7/16/2019  Hospital Length of Stay: 0 days  Code Status: Full Code   Attending Provider: Antonio Hadley Jr.,*   Consulting Provider: Kirit Saldaña MD  Primary Care Physician: Ja Méndez MD  Principal Problem:<principal problem not specified>    Inpatient consult to Gastroenterology  Consult performed by: Kirit Saldaña MD  Consult ordered by: Yohan Rizvi MD        Subjective:     HPI: Tim Richards is a 53 y.o. male with history of HTN, gout, CHF, dilated CMP, has LVAD in place comes in with symptoms of BRBPR.     Yesterday afternoon he ate, around 6pm he felt "grumbling" in lower abdomen, and felt dizzy, sat on the toilet, had bloody bright red diarrhea mixed with stool, had numerous amounts, ~14 since yesterday 6pm, also felt dizzy when standing up from seated position. He did not loose consciousness. He takes coumadin, INR is 2.7, hb is 7.9 down from 14 2 weeks ago, BUN 28, cr 2, denies epigastric pain. Last BM was at 12 PM still bloody, last time he ate was around 7:30 PM yesterday. He denies NSAID use, uses asa 81 daily, smokes 31 pack years, no etoh use, no liver disease or history of varicies, denies severe retching, hx of GI bleed, past colonoscopy was done on 3/6/19 one 15 mm polyp in descending colon removed with hot snare, never had EGD done.         Past Medical History:   Diagnosis Date    CHF (congestive heart failure)     Dilated cardiomyopathy 1/10/2018    Disorder of kidney and ureter     CKD    Gout     HTN (hypertension)     Ventricular tachycardia (paroxysmal)        Past Surgical History:   Procedure Laterality Date    CARDIAC CATHETERIZATION  Dec. 2012    CARDIAC DEFIBRILLATOR PLACEMENT Left     CRRT-D    CLOSURE-STERNAL WOUND N/A 3/10/2018    Performed by Yg Kaufman MD at [facility] OR [address] [place] [address]    COLONOSCOPY N/A " 3/6/2018    Performed by Alonso [facility] at [facility] ENDO (2ND FLR)    INSERTION-ICD-BIVENTRICULAR N/A 10/31/2014    [facility] CATH LAB    Insertion-Left Ventricular Assist Device N/A 3/8/2018    Performed by [facility] at [facility] OR 2ND FLR    PLACEMENT-NEOPERICARDIUM  3/10/2018    Performed by [facility] at [facility] OR 2ND FLR    REVISION  3/9/2018    Performed by [facility] MD at [facility] OR 2ND FLR    WASHOUT- MEDIASTINUM  3/9/2018    Performed by Yg [facility]man MD at [facility] OR 2ND FLR       Review of patient's allergies indicates:   Allergen Reactions    Lisinopril Anaphylaxis     Family History       Problem Relation (Age of Onset)    Brain Hemorrhage Father    No Known Problems Mother, Brother    Ovarian cancer Sister          Tobacco Use    Smoking status: Former Smoker     Packs/day: 1.00     Years: 31.00     Pack years: 31.00     Types: Cigarettes     Last attempt to quit: 2018     Years since quittin.5    Smokeless tobacco: Never Used    Tobacco comment: pt is quiting on his own - pt stated not qualified for program;  pt  quit on his own   Substance and Sexual Activity    Alcohol use: No     Alcohol/week: 0.0 oz     Comment: one fifth of gin or rum/week    Drug use: No    Sexual activity: Yes     Partners: Female     Birth control/protection: None     Comment: 10/5/17  with same partner 7 years      Review of Systems   Constitutional: Positive for appetite change.   HENT: Negative for congestion and trouble swallowing.    Eyes: Negative for visual disturbance.   Respiratory: Negative for chest tightness.    Cardiovascular: Negative for chest pain.   Gastrointestinal: Positive for abdominal pain, blood in stool and diarrhea. Negative for abdominal distention, nausea, rectal pain and vomiting.   Genitourinary: Negative for dysuria.   Neurological: Positive for light-headedness.     Objective:     Vital Signs (Most Recent):  Temp: 99.3 °F  (37.4 °C) (07/16/19 1324)  Pulse: 90 (07/16/19 1449)  Resp: (!) 26 (07/16/19 1449)  BP: (!) 84/0(LVAD patient  84/ palp) (07/16/19 1324)  SpO2: 96 % (07/16/19 1449) Vital Signs (24h Range):  Temp:  [98.6 °F (37 °C)-99.3 °F (37.4 °C)] 99.3 °F (37.4 °C)  Pulse:  [90-93] 90  Resp:  [18-26] 26  SpO2:  [90 %-100 %] 96 %  BP: (84)/(0) 84/0     Weight: 117.9 kg (260 lb) (07/16/19 1219)  Body mass index is 35.26 kg/m².      Physical Exam   Constitutional: No distress.   HENT:   Head: Normocephalic and atraumatic.   Eyes: Conjunctivae are normal. No scleral icterus.   Neck: Neck supple.   Cardiovascular: Normal rate and regular rhythm.   Pulmonary/Chest: Effort normal and breath sounds normal.   Abdominal: Soft. Normal appearance and bowel sounds are normal. He exhibits no distension and no mass. There is no tenderness. There is no rebound and no guarding.   LVAD in left abdominal area   Neurological: He is alert.   Skin: Skin is warm and dry. He is not diaphoretic.   Vitals reviewed.      Significant Labs:  All pertinent lab results from the last 24 hours have been reviewed.    Significant Imaging:  Imaging results within the past 24 hours have been reviewed.    Assessment/Plan:       Elevated INR, on coumadin for LVAD  Hematochezia   CMP on asa 81 daily  Orthostatic symptoms  Acute anemia  LLQ abdominal pain  -NPO from midnight  -volume resuscitation per primary  -transfuse to keep at least above 7, per primary  -PPI 80 once, then PPI ggt, or PPI 40 BID  -Goal hb atleast >7, plt >50, INR <1.5  -avoid NSAIDS, ASA if possible  -try to bring INR closer to 2 if possible  -CT abdomen w/o due to kidney function  -recommend stool studies  -go lytely tonight  -EGD/colon planned from 7/17  -discussed with primary team      Thank you for your consult.     Kirit Saldaña MD  Gastroenterology Fellow PGY IV   [facility]

## 2019-07-16 NOTE — HPI
53 M with hx of DCM, s/p HM2 3/8/18 presents to ER with BRBPR and LH/dizziness. Pt says he was feeling fine until yesterday afternoon after lunch. He says his stomach started grumbling a lot and then he started feeling LH/dizzy with standing. He started having bloody diarrhea around 6 pm last night. Last episode 11:30 this am. Having numbness in lips and hands at times. C/o LLQ abd pain. No N/V.     In ER, Hgb was found to be 7.9, down from 14.5 2 weeks ago

## 2019-07-16 NOTE — ED NOTES
Pt transported to room 6096 on telemetry monitor with nurse  Pt condition stable on leaving the ED, pt belongings are with pt and pt's wife notified of room number  Pt has # 1 unit PRBC's infusing at 100 ml per hr per hour per right forearm saline lock

## 2019-07-17 ENCOUNTER — ANESTHESIA EVENT (OUTPATIENT)
Dept: ENDOSCOPY | Facility: HOSPITAL | Age: 54
DRG: 394 | End: 2019-07-17
Payer: COMMERCIAL

## 2019-07-17 PROBLEM — D72.829 LEUKOCYTOSIS: Status: ACTIVE | Noted: 2019-07-17

## 2019-07-17 LAB
ALBUMIN SERPL BCP-MCNC: 2.9 G/DL (ref 3.5–5.2)
ALP SERPL-CCNC: 52 U/L (ref 55–135)
ALT SERPL W/O P-5'-P-CCNC: 12 U/L (ref 10–44)
ANION GAP SERPL CALC-SCNC: 9 MMOL/L (ref 8–16)
ANISOCYTOSIS BLD QL SMEAR: SLIGHT
ANISOCYTOSIS BLD QL SMEAR: SLIGHT
APTT BLDCRRT: 25.9 SEC (ref 21–32)
ASCENDING AORTA: 3.36 CM
AST SERPL-CCNC: 24 U/L (ref 10–40)
BASOPHILS # BLD AUTO: 0.04 K/UL (ref 0–0.2)
BASOPHILS # BLD AUTO: 0.05 K/UL (ref 0–0.2)
BASOPHILS NFR BLD: 0.2 % (ref 0–1.9)
BASOPHILS NFR BLD: 0.2 % (ref 0–1.9)
BASOPHILS NFR BLD: 0.3 % (ref 0–1.9)
BASOPHILS NFR BLD: 0.3 % (ref 0–1.9)
BILIRUB DIRECT SERPL-MCNC: 0.6 MG/DL (ref 0.1–0.3)
BILIRUB SERPL-MCNC: 1.6 MG/DL (ref 0.1–1)
BLD PROD TYP BPU: NORMAL
BLOOD UNIT EXPIRATION DATE: NORMAL
BLOOD UNIT TYPE CODE: 9500
BLOOD UNIT TYPE: NORMAL
BNP SERPL-MCNC: 153 PG/ML (ref 0–99)
BSA FOR ECHO PROCEDURE: 2.45 M2
BUN SERPL-MCNC: 31 MG/DL (ref 6–20)
C DIFF GDH STL QL: NEGATIVE
C DIFF TOX A+B STL QL IA: NEGATIVE
CALCIUM SERPL-MCNC: 8.2 MG/DL (ref 8.7–10.5)
CHLORIDE SERPL-SCNC: 104 MMOL/L (ref 95–110)
CO2 SERPL-SCNC: 24 MMOL/L (ref 23–29)
CODING SYSTEM: NORMAL
CREAT SERPL-MCNC: 2.3 MG/DL (ref 0.5–1.4)
CRP SERPL-MCNC: 12.2 MG/L (ref 0–8.2)
CV ECHO LV RWT: 0.32 CM
DIFFERENTIAL METHOD: ABNORMAL
DISPENSE STATUS: NORMAL
DOP CALC LVOT AREA: 3.3 CM2
DOP CALC LVOT DIAMETER: 2.06 CM
E COLI SXT1 STL QL IA: NEGATIVE
E COLI SXT2 STL QL IA: NEGATIVE
ECHO LV POSTERIOR WALL: 1.14 CM (ref 0.6–1.1)
EOSINOPHIL # BLD AUTO: 0 K/UL (ref 0–0.5)
EOSINOPHIL # BLD AUTO: 0 K/UL (ref 0–0.5)
EOSINOPHIL # BLD AUTO: 0.1 K/UL (ref 0–0.5)
EOSINOPHIL # BLD AUTO: 0.2 K/UL (ref 0–0.5)
EOSINOPHIL NFR BLD: 0 % (ref 0–8)
EOSINOPHIL NFR BLD: 0 % (ref 0–8)
EOSINOPHIL NFR BLD: 0.9 % (ref 0–8)
EOSINOPHIL NFR BLD: 1.3 % (ref 0–8)
ERYTHROCYTE [DISTWIDTH] IN BLOOD BY AUTOMATED COUNT: 14.6 % (ref 11.5–14.5)
ERYTHROCYTE [DISTWIDTH] IN BLOOD BY AUTOMATED COUNT: 14.6 % (ref 11.5–14.5)
ERYTHROCYTE [DISTWIDTH] IN BLOOD BY AUTOMATED COUNT: 15.6 % (ref 11.5–14.5)
ERYTHROCYTE [DISTWIDTH] IN BLOOD BY AUTOMATED COUNT: 15.7 % (ref 11.5–14.5)
EST. GFR  (AFRICAN AMERICAN): 36.4 ML/MIN/1.73 M^2
EST. GFR  (NON AFRICAN AMERICAN): 31.5 ML/MIN/1.73 M^2
FRACTIONAL SHORTENING: 7 % (ref 28–44)
GLUCOSE SERPL-MCNC: 117 MG/DL (ref 70–110)
HCT VFR BLD AUTO: 23.7 % (ref 40–54)
HCT VFR BLD AUTO: 24.6 % (ref 40–54)
HCT VFR BLD AUTO: 25.9 % (ref 40–54)
HCT VFR BLD AUTO: 26 % (ref 40–54)
HGB BLD-MCNC: 7.8 G/DL (ref 14–18)
HGB BLD-MCNC: 7.9 G/DL (ref 14–18)
HGB BLD-MCNC: 8.3 G/DL (ref 14–18)
HGB BLD-MCNC: 8.6 G/DL (ref 14–18)
HYPOCHROMIA BLD QL SMEAR: ABNORMAL
IMM GRANULOCYTES # BLD AUTO: 0.13 K/UL (ref 0–0.04)
IMM GRANULOCYTES # BLD AUTO: 0.17 K/UL (ref 0–0.04)
IMM GRANULOCYTES # BLD AUTO: 0.24 K/UL (ref 0–0.04)
IMM GRANULOCYTES # BLD AUTO: 0.26 K/UL (ref 0–0.04)
IMM GRANULOCYTES NFR BLD AUTO: 0.8 % (ref 0–0.5)
IMM GRANULOCYTES NFR BLD AUTO: 0.9 % (ref 0–0.5)
IMM GRANULOCYTES NFR BLD AUTO: 1.1 % (ref 0–0.5)
IMM GRANULOCYTES NFR BLD AUTO: 1.1 % (ref 0–0.5)
INR PPP: 2.1 (ref 0.8–1.2)
INTERVENTRICULAR SEPTUM: 0.86 CM (ref 0.6–1.1)
LA MAJOR: 5.77 CM
LA MINOR: 5.89 CM
LA WIDTH: 3.92 CM
LACTATE SERPL-SCNC: 1 MMOL/L (ref 0.5–2.2)
LDH SERPL L TO P-CCNC: 288 U/L (ref 110–260)
LEFT ATRIUM SIZE: 4.05 CM
LEFT ATRIUM VOLUME INDEX: 33 ML/M2
LEFT ATRIUM VOLUME: 78.67 CM3
LEFT INTERNAL DIMENSION IN SYSTOLE: 6.7 CM (ref 2.1–4)
LEFT VENTRICLE DIASTOLIC VOLUME INDEX: 115.2 ML/M2
LEFT VENTRICLE DIASTOLIC VOLUME: 274.38 ML
LEFT VENTRICLE MASS INDEX: 143 G/M2
LEFT VENTRICLE SYSTOLIC VOLUME INDEX: 97.2 ML/M2
LEFT VENTRICLE SYSTOLIC VOLUME: 231.48 ML
LEFT VENTRICULAR INTERNAL DIMENSION IN DIASTOLE: 7.23 CM (ref 3.5–6)
LEFT VENTRICULAR MASS: 340.39 G
LYMPHOCYTES # BLD AUTO: 1 K/UL (ref 1–4.8)
LYMPHOCYTES # BLD AUTO: 1.3 K/UL (ref 1–4.8)
LYMPHOCYTES # BLD AUTO: 1.4 K/UL (ref 1–4.8)
LYMPHOCYTES # BLD AUTO: 1.4 K/UL (ref 1–4.8)
LYMPHOCYTES NFR BLD: 10.1 % (ref 18–48)
LYMPHOCYTES NFR BLD: 3.4 % (ref 18–48)
LYMPHOCYTES NFR BLD: 6.2 % (ref 18–48)
LYMPHOCYTES NFR BLD: 9 % (ref 18–48)
MAGNESIUM SERPL-MCNC: 2.2 MG/DL (ref 1.6–2.6)
MCH RBC QN AUTO: 29.2 PG (ref 27–31)
MCH RBC QN AUTO: 29.3 PG (ref 27–31)
MCH RBC QN AUTO: 29.4 PG (ref 27–31)
MCH RBC QN AUTO: 29.8 PG (ref 27–31)
MCHC RBC AUTO-ENTMCNC: 31.9 G/DL (ref 32–36)
MCHC RBC AUTO-ENTMCNC: 32.1 G/DL (ref 32–36)
MCHC RBC AUTO-ENTMCNC: 32.9 G/DL (ref 32–36)
MCHC RBC AUTO-ENTMCNC: 33.2 G/DL (ref 32–36)
MCV RBC AUTO: 89 FL (ref 82–98)
MCV RBC AUTO: 90 FL (ref 82–98)
MCV RBC AUTO: 91 FL (ref 82–98)
MCV RBC AUTO: 92 FL (ref 82–98)
MONOCYTES # BLD AUTO: 1.6 K/UL (ref 0.3–1)
MONOCYTES # BLD AUTO: 1.7 K/UL (ref 0.3–1)
MONOCYTES # BLD AUTO: 1.7 K/UL (ref 0.3–1)
MONOCYTES # BLD AUTO: 2.4 K/UL (ref 0.3–1)
MONOCYTES NFR BLD: 11.7 % (ref 4–15)
MONOCYTES NFR BLD: 12.7 % (ref 4–15)
MONOCYTES NFR BLD: 6.9 % (ref 4–15)
MONOCYTES NFR BLD: 8.4 % (ref 4–15)
NEUTROPHILS # BLD AUTO: 10.2 K/UL (ref 1.8–7.7)
NEUTROPHILS # BLD AUTO: 11.4 K/UL (ref 1.8–7.7)
NEUTROPHILS # BLD AUTO: 20 K/UL (ref 1.8–7.7)
NEUTROPHILS # BLD AUTO: 24.7 K/UL (ref 1.8–7.7)
NEUTROPHILS NFR BLD: 74.7 % (ref 38–73)
NEUTROPHILS NFR BLD: 77 % (ref 38–73)
NEUTROPHILS NFR BLD: 85.6 % (ref 38–73)
NEUTROPHILS NFR BLD: 87.2 % (ref 38–73)
NRBC BLD-RTO: 0 /100 WBC
O+P STL TRI STN: NORMAL
OVALOCYTES BLD QL SMEAR: ABNORMAL
PHOSPHATE SERPL-MCNC: 3.1 MG/DL (ref 2.7–4.5)
PLATELET # BLD AUTO: 112 K/UL (ref 150–350)
PLATELET # BLD AUTO: 115 K/UL (ref 150–350)
PLATELET # BLD AUTO: 140 K/UL (ref 150–350)
PLATELET # BLD AUTO: 145 K/UL (ref 150–350)
PLATELET BLD QL SMEAR: ABNORMAL
PLATELET BLD QL SMEAR: ABNORMAL
PMV BLD AUTO: 10.4 FL (ref 9.2–12.9)
PMV BLD AUTO: 10.4 FL (ref 9.2–12.9)
PMV BLD AUTO: 10.6 FL (ref 9.2–12.9)
PMV BLD AUTO: 10.9 FL (ref 9.2–12.9)
POIKILOCYTOSIS BLD QL SMEAR: SLIGHT
POLYCHROMASIA BLD QL SMEAR: ABNORMAL
POTASSIUM SERPL-SCNC: 5.1 MMOL/L (ref 3.5–5.1)
PREALB SERPL-MCNC: 19 MG/DL (ref 20–43)
PROT SERPL-MCNC: 5.3 G/DL (ref 6–8.4)
PROTHROMBIN TIME: 20.5 SEC (ref 9–12.5)
RA MAJOR: 4.53 CM
RA PRESSURE: 3 MMHG
RA WIDTH: 3.41 CM
RBC # BLD AUTO: 2.66 M/UL (ref 4.6–6.2)
RBC # BLD AUTO: 2.69 M/UL (ref 4.6–6.2)
RBC # BLD AUTO: 2.84 M/UL (ref 4.6–6.2)
RBC # BLD AUTO: 2.89 M/UL (ref 4.6–6.2)
RIGHT VENTRICULAR END-DIASTOLIC DIMENSION: 4.36 CM
SINUS: 3.4 CM
SODIUM SERPL-SCNC: 137 MMOL/L (ref 136–145)
STJ: 2.91 CM
TDI LATERAL: 0.04 M/S
TRANS ERYTHROCYTES VOL PATIENT: NORMAL ML
TRICUSPID ANNULAR PLANE SYSTOLIC EXCURSION: 2.26 CM
WBC # BLD AUTO: 13.69 K/UL (ref 3.9–12.7)
WBC # BLD AUTO: 14.83 K/UL (ref 3.9–12.7)
WBC # BLD AUTO: 23.38 K/UL (ref 3.9–12.7)
WBC # BLD AUTO: 28.36 K/UL (ref 3.9–12.7)
WBC #/AREA STL HPF: ABNORMAL /[HPF]

## 2019-07-17 PROCEDURE — 99291 CRITICAL CARE FIRST HOUR: CPT | Mod: ,,, | Performed by: PHYSICIAN ASSISTANT

## 2019-07-17 PROCEDURE — P9021 RED BLOOD CELLS UNIT: HCPCS

## 2019-07-17 PROCEDURE — 27000248 HC VAD-ADDITIONAL DAY

## 2019-07-17 PROCEDURE — 25000003 PHARM REV CODE 250: Performed by: INTERNAL MEDICINE

## 2019-07-17 PROCEDURE — 63600175 PHARM REV CODE 636 W HCPCS: Performed by: INTERNAL MEDICINE

## 2019-07-17 PROCEDURE — 86140 C-REACTIVE PROTEIN: CPT

## 2019-07-17 PROCEDURE — 94761 N-INVAS EAR/PLS OXIMETRY MLT: CPT

## 2019-07-17 PROCEDURE — 83615 LACTATE (LD) (LDH) ENZYME: CPT

## 2019-07-17 PROCEDURE — 85730 THROMBOPLASTIN TIME PARTIAL: CPT

## 2019-07-17 PROCEDURE — 99291 PR CRITICAL CARE, E/M 30-74 MINUTES: ICD-10-PCS | Mod: ,,, | Performed by: PHYSICIAN ASSISTANT

## 2019-07-17 PROCEDURE — 93750 PR INTERROGATE VENT ASSIST DEV, IN PERSON, W PHYSICIAN ANALYSIS: ICD-10-PCS | Mod: ,,, | Performed by: INTERNAL MEDICINE

## 2019-07-17 PROCEDURE — C9113 INJ PANTOPRAZOLE SODIUM, VIA: HCPCS | Performed by: PHYSICIAN ASSISTANT

## 2019-07-17 PROCEDURE — 83880 ASSAY OF NATRIURETIC PEPTIDE: CPT

## 2019-07-17 PROCEDURE — 87040 BLOOD CULTURE FOR BACTERIA: CPT | Mod: 59

## 2019-07-17 PROCEDURE — 99292 PR CRITICAL CARE, ADDL 30 MIN: ICD-10-PCS | Mod: ,,, | Performed by: INTERNAL MEDICINE

## 2019-07-17 PROCEDURE — 27201040 HC RC 50 FILTER: Mod: 91

## 2019-07-17 PROCEDURE — 84100 ASSAY OF PHOSPHORUS: CPT

## 2019-07-17 PROCEDURE — 85025 COMPLETE CBC W/AUTO DIFF WBC: CPT | Mod: 91

## 2019-07-17 PROCEDURE — 25000003 PHARM REV CODE 250: Performed by: STUDENT IN AN ORGANIZED HEALTH CARE EDUCATION/TRAINING PROGRAM

## 2019-07-17 PROCEDURE — 83735 ASSAY OF MAGNESIUM: CPT

## 2019-07-17 PROCEDURE — 27000221 HC OXYGEN, UP TO 24 HOURS

## 2019-07-17 PROCEDURE — 87449 NOS EACH ORGANISM AG IA: CPT

## 2019-07-17 PROCEDURE — 20000000 HC ICU ROOM

## 2019-07-17 PROCEDURE — 93750 INTERROGATION VAD IN PERSON: CPT | Mod: ,,, | Performed by: INTERNAL MEDICINE

## 2019-07-17 PROCEDURE — 84134 ASSAY OF PREALBUMIN: CPT

## 2019-07-17 PROCEDURE — 63600175 PHARM REV CODE 636 W HCPCS: Performed by: PHYSICIAN ASSISTANT

## 2019-07-17 PROCEDURE — 85610 PROTHROMBIN TIME: CPT

## 2019-07-17 PROCEDURE — 85060 BLOOD SMEAR INTERPRETATION: CPT | Mod: ,,, | Performed by: PATHOLOGY

## 2019-07-17 PROCEDURE — 85060 PATHOLOGIST REVIEW: ICD-10-PCS | Mod: ,,, | Performed by: PATHOLOGY

## 2019-07-17 PROCEDURE — 80048 BASIC METABOLIC PNL TOTAL CA: CPT

## 2019-07-17 PROCEDURE — 36430 TRANSFUSION BLD/BLD COMPNT: CPT

## 2019-07-17 PROCEDURE — 86644 CMV ANTIBODY: CPT

## 2019-07-17 PROCEDURE — 99292 CRITICAL CARE ADDL 30 MIN: CPT | Mod: ,,, | Performed by: INTERNAL MEDICINE

## 2019-07-17 PROCEDURE — 83605 ASSAY OF LACTIC ACID: CPT

## 2019-07-17 PROCEDURE — 80076 HEPATIC FUNCTION PANEL: CPT

## 2019-07-17 RX ORDER — FUROSEMIDE 10 MG/ML
40 INJECTION INTRAMUSCULAR; INTRAVENOUS ONCE
Status: COMPLETED | OUTPATIENT
Start: 2019-07-17 | End: 2019-07-17

## 2019-07-17 RX ORDER — HYDROCODONE BITARTRATE AND ACETAMINOPHEN 500; 5 MG/1; MG/1
TABLET ORAL
Status: DISCONTINUED | OUTPATIENT
Start: 2019-07-17 | End: 2019-07-22

## 2019-07-17 RX ORDER — ONDANSETRON 2 MG/ML
4 INJECTION INTRAMUSCULAR; INTRAVENOUS EVERY 6 HOURS PRN
Status: DISCONTINUED | OUTPATIENT
Start: 2019-07-17 | End: 2019-07-17

## 2019-07-17 RX ORDER — POLYETHYLENE GLYCOL 3350, SODIUM SULFATE ANHYDROUS, SODIUM BICARBONATE, SODIUM CHLORIDE, POTASSIUM CHLORIDE 236; 22.74; 6.74; 5.86; 2.97 G/4L; G/4L; G/4L; G/4L; G/4L
4000 POWDER, FOR SOLUTION ORAL ONCE
Status: COMPLETED | OUTPATIENT
Start: 2019-07-17 | End: 2019-07-17

## 2019-07-17 RX ADMIN — PROMETHAZINE HYDROCHLORIDE 6.25 MG: 25 INJECTION INTRAMUSCULAR; INTRAVENOUS at 01:07

## 2019-07-17 RX ADMIN — ALLOPURINOL 100 MG: 100 TABLET ORAL at 08:07

## 2019-07-17 RX ADMIN — AMIODARONE HYDROCHLORIDE 200 MG: 200 TABLET ORAL at 08:07

## 2019-07-17 RX ADMIN — PANTOPRAZOLE SODIUM 40 MG: 40 INJECTION, POWDER, FOR SOLUTION INTRAVENOUS at 09:07

## 2019-07-17 RX ADMIN — FUROSEMIDE 40 MG: 10 INJECTION, SOLUTION INTRAMUSCULAR; INTRAVENOUS at 01:07

## 2019-07-17 RX ADMIN — MAGNESIUM OXIDE TAB 400 MG (241.3 MG ELEMENTAL MG) 400 MG: 400 (241.3 MG) TAB at 08:07

## 2019-07-17 RX ADMIN — MAGNESIUM OXIDE TAB 400 MG (241.3 MG ELEMENTAL MG) 400 MG: 400 (241.3 MG) TAB at 09:07

## 2019-07-17 RX ADMIN — PANTOPRAZOLE SODIUM 40 MG: 40 INJECTION, POWDER, FOR SOLUTION INTRAVENOUS at 10:07

## 2019-07-17 RX ADMIN — MAGNESIUM OXIDE TAB 400 MG (241.3 MG ELEMENTAL MG) 400 MG: 400 (241.3 MG) TAB at 03:07

## 2019-07-17 RX ADMIN — POLYETHYLENE GLYCOL 3350, SODIUM SULFATE ANHYDROUS, SODIUM BICARBONATE, SODIUM CHLORIDE, POTASSIUM CHLORIDE 4000 ML: 236; 22.74; 6.74; 5.86; 2.97 POWDER, FOR SOLUTION ORAL at 06:07

## 2019-07-17 RX ADMIN — FERROUS GLUCONATE TAB 324 MG (37.5 MG ELEMENTAL IRON) 324 MG: 324 (37.5 FE) TAB at 08:07

## 2019-07-17 RX ADMIN — PROMETHAZINE HYDROCHLORIDE 6.25 MG: 25 INJECTION INTRAMUSCULAR; INTRAVENOUS at 06:07

## 2019-07-17 NOTE — PROGRESS NOTES
[facility]  Heart Transplant  Progress Note    Patient Name: Tim Richards  MRN: [id number]  Admission Date: 7/16/2019  Hospital Length of Stay: 1 days  Attending Physician: Antonio Hadley Jr.,*  Primary Care Provider: Ja Méndez MD  Principal Problem:GIB (gastrointestinal bleeding)    Subjective:     Interval History: Sitting up on side of bed this am. No longer getting LH or dizzy but is very fatigued. Last BM was 0130 this am, bloody diarrhea. Had difficulty drinking Golytley last night due to N/V. Tried to drink more after getting Phenergan last night but still caused nausea    Continuous Infusions:  Scheduled Meds:   allopurinol  100 mg Oral Daily    amiodarone  200 mg Oral Daily    ferrous gluconate  324 mg Oral Daily with breakfast    magnesium oxide  400 mg Oral TID    pantoprazole  40 mg Intravenous BID     PRN Meds:sodium chloride, sodium chloride, sodium chloride, sodium chloride, promethazine (PHENERGAN) IVPB    Review of patient's allergies indicates:   Allergen Reactions    Lisinopril Anaphylaxis     Objective:     Vital Signs (Most Recent):  Temp: 98.7 °F (37.1 °C) (07/17/19 0730)  Pulse: 75 (07/17/19 1200)  Resp: 20 (07/17/19 1200)  BP: 98/68 (07/17/19 1200)  SpO2: 100 % (07/17/19 1100) Vital Signs (24h Range):  Temp:  [98 °F (36.7 °C)-99.8 °F (37.7 °C)] 98.7 °F (37.1 °C)  Pulse:  [] 75  Resp:  [] 20  SpO2:  [92 %-100 %] 100 %  BP: ()/(0-72) 98/68     Patient Vitals for the past 72 hrs (Last 3 readings):   Weight   07/16/19 1915 117.9 kg (260 lb)   07/16/19 1857 117.9 kg (260 lb)   07/16/19 1700 117.9 kg (260 lb)     Body mass index is 34.3 kg/m².      Intake/Output Summary (Last 24 hours) at 7/17/2019 1235  Last data filed at 7/17/2019 1100  Gross per 24 hour   Intake 4065 ml   Output 2000 ml   Net 2065 ml       Hemodynamic Parameters:       Telemetry: Reviewed    Physical Exam   Constitutional: He is oriented to person, place, and time.  He appears well-developed and well-nourished.   Neck: Normal range of motion. Neck supple. No JVD present.   Cardiovascular: Normal rate and regular rhythm.   Normal VAD hum   Pulmonary/Chest: Effort normal and breath sounds normal. He has no wheezes. He has no rales.   Abdominal: Soft. Bowel sounds are normal. There is no tenderness.   Musculoskeletal: He exhibits no edema.   Neurological: He is alert and oriented to person, place, and time.   Skin: Skin is warm and dry.       Significant Labs:  CBC:  Recent Labs   Lab 07/16/19  2045 07/17/19  0414 07/17/19  0816   WBC 12.09 28.36* 23.38*   RBC 2.76* 2.84* 2.89*   HGB 7.9* 8.3* 8.6*   HCT 25.4* 26.0* 25.9*    145* 140*   MCV 92 92 90   MCH 28.6 29.2 29.8   MCHC 31.1* 31.9* 33.2     BNP:  Recent Labs   Lab 07/16/19  1305 07/17/19  0414   BNP 47 153*     CMP:  Recent Labs   Lab 07/16/19  1305 07/17/19  0414   * 117*   CALCIUM 8.9 8.2*   ALBUMIN 3.2* 2.9*   PROT 6.0 5.3*    137   K 4.5 5.1   CO2 24 24    104   BUN 28* 31*   CREATININE 2.0* 2.3*   ALKPHOS 60 52*   ALT 15 12   AST 22 24   BILITOT 0.5 1.6*      Coagulation:   Recent Labs   Lab 07/16/19  1305 07/17/19  0414   INR 2.7* 2.1*   APTT  --  25.9     LDH:  Recent Labs   Lab 07/16/19  1305 07/17/19  0414    288*     Microbiology:  Microbiology Results (last 7 days)     Procedure Component Value Units Date/Time    E. coli 0157 antigen [[id number]] Collected:  07/16/19 1731    Order Status:  Completed Specimen:  Stool Updated:  07/17/19 1046     Shiga Toxin 1 E.coli Negative     Shiga Toxin 2 E.coli Negative    Clostridium difficile EIA [[id number]] Collected:  07/17/19 0904    Order Status:  Sent Specimen:  Stool Updated:  07/17/19 0943    Stool culture [[id number]] Collected:  07/16/19 1731    Order Status:  Sent Specimen:  Stool Updated:  07/16/19 1751          I have reviewed all pertinent labs within the past 24 hours.    Estimated Creatinine Clearance: 50.5 mL/min (A) (based on  SCr of 2.3 mg/dL (H)).    Diagnostic Results:  I have reviewed all pertinent imaging results/findings within the past 24 hours.    Assessment and Plan:     53 M with hx of DCM, s/p HM2 3/8/18 presents to ER with BRBPR and LH/dizziness. Pt says he was feeling fine until yesterday afternoon after lunch. He says his stomach started grumbling a lot and then he started feeling LH/dizzy with standing. He started having bloody diarrhea around 6 pm last night. Last episode 11:30 this am. Having numbness in lips and hands at times. C/o LLQ abd pain. No N/V.     In ER, Hgb was found to be 7.9, down from 14.5 2 weeks ago      * GIB (gastrointestinal bleeding)  -Acute GIB with hematochezia  -Hgb 7.9 on admit, down from 14.5 ~ 2 weeks ago  -Transfused 5 units PRBCs total since admission without appropriate response in H&H. Hgb 8.6 today from 7.9. Will transfuse another 2 units PRBCs today  -Hold home coumadin and ASA for now; will not reverse at this time. INR 2.1 today  -GI consulted- planned for EGD/Cscope today but now want to r/o C Diff which is pending.   -CT abdomen 7/16/19- no colitis  -Protonix 80 mg IV x 1 on admit then Protonix 40 IV BID  -Hold antiHTNs and Bumex for now in setting of bleed        Anemia  -Acute anemia, due to GIB.  -Hgb 7.9 on admit, down from 14.5 ~ 2 weeks ago  -See GIB    Leukocytosis  -Check C diff, as above  -Stool culture pending  -Will also check blood cultures    LVAD (left ventricular assist device) present  -Heartmate 2, implanted 3/8/18 as DT  -INR therapeutic today, 2.1 with goal 2-3. Hold coumadin due to GIB  -Hold ASA for now  -MAP goal 60-80,  will hold anti-HTNs for now given active GIB (spironolactone, doxazosin, amlodipine, hydralazine)  -Appears euvolemic, monitor with blood transfusions. Lasix 40 IV x 1 given overnight between transfusions. On Bumex 4 at home  -Last Echo 1/2019: LVEDD 9.1, LVEF 10%. Will get Echo     Procedure: Device Interrogation Including analysis of device  parameters  Current Settings: Ventricular Assist Device  Review of device function is stable/unstable stable    TXP LVAD INTERROGATIONS 7/17/2019 7/17/2019 7/17/2019 7/17/2019 7/17/2019 7/17/2019 7/17/2019   Type HeartMate II HeartMate II HeartMate II HeartMate II HeartMate II HeartMate II HeartMate II   Flow 5.1 5.5 5.2 5 4.7 5 5   Speed 9800 9800 9800 9800 9800 9800 9800   PI 3.6 3.4 3.1 4 2.7 4.9 4.5   Power (Jackson) 6.2 6.4 6.4 6.2 5.9 6.1 6.2   LSL 9400 9400 9400 9400 9400 9400 9400   Pulsatility Intermittent pulse Intermittent pulse Intermittent pulse Intermittent pulse Intermittent pulse Intermittent pulse Intermittent pulse       PVT (paroxysmal ventricular tachycardia)  -Cont Amio  -ICD in place    Uninterrupted Critical Care/Counseling Time (not including procedures): 45 minutes    Nely Wilkinson PA-C  Heart Transplant  [facility]

## 2019-07-17 NOTE — TREATMENT PLAN
GI Treatment Plan    Tim Richards is a 53 y.o. male admitted to hospital 7/16/2019 (Hospital Day: 2) due to GIB (gastrointestinal bleeding).     Interval History  Had another episode of hematochezia overnight, VS stable, received 2 units PRBC on 7/16, and 3 units pRBC on 7/17, hb 8.6 this morning, INR 2.1, , HR wnl. Given sudden leukocytosis, C diff may be in differential. CT abdomen negative for colitis.  Plan    -order c diff stool studies, once result comes, further plans to be determined  -keep NPO from midnight  - Plan of care was discussed with primary team.  - We will continue to follow.    Thank you for involving us in the care of Tim Richards. Please call with any additional questions, concerns or changes in the patient's clinical status.    Kirit Saldaña MD  Gastroenterology Fellow  Spectralink: [id number]

## 2019-07-17 NOTE — ANESTHESIA PREPROCEDURE EVALUATION
[facility]  Anesthesia Pre-Operative Evaluation         Patient Name: Tim Richards  YOB: 1965  MRN: [id number]    SUBJECTIVE:     Pre-operative evaluation for Procedure(s) (LRB):  EGD (ESOPHAGOGASTRODUODENOSCOPY) (N/A)  COLONOSCOPY (N/A)     07/17/2019    Tim Richards is a 53 y.o. male w/ a significant PMHx of difficult intubation (see prev airway documentation DCM s/p HM2 on 3/8/18, HTN, and CKD who presented to the ER with BRBPR and LH/dizziness. He received 2 units PRBC on 7/16 and 3 units pRBC on 7/17. His hgb was 8.6 this morning, INR 2.1, and PLTs 140. There was concern for C diff 2/2 a sudden leukocytosis on labs. CT abdomen however was negative for colitis. In addition, cdiff stool studies were negative. Patient now presents for the above procedure(s).      LDA:        Peripheral IV - Single Lumen 07/16/19 1305 18 G;1 1/4 in Right Forearm (Active)   Site Assessment Clean;No redness;Intact;Dry 7/17/2019  3:00 PM   Line Status Saline locked 7/17/2019  3:00 PM   Dressing Status Clean;Dry;Intact 7/17/2019  3:00 PM   Dressing Intervention Dressing changed 7/17/2019  2:00 AM   Dressing Change Due 07/21/19 7/17/2019  3:00 PM   Site Change Due 07/20/19 7/17/2019  7:01 AM   Reason Not Rotated Not due 7/17/2019  3:00 PM   Number of days: 1            Peripheral IV - Single Lumen 07/16/19 1551 18 G;1 1/4 in Left Hand (Active)   Site Assessment Clean;Dry;Intact 7/17/2019  3:00 PM   Line Status Infusing 7/17/2019  3:00 PM   Dressing Status Clean;Intact;Dry 7/17/2019  3:00 PM   Dressing Intervention New dressing 7/16/2019  3:51 PM   Dressing Change Due 07/20/19 7/17/2019  3:00 PM   Site Change Due 07/20/19 7/17/2019  7:01 AM   Reason Not Rotated Not due 7/17/2019  3:00 PM   Number of days: 1            VAD 03/08/18 1124 Left ventricular assist device HeartMate II (Active)   Site Location Abdomen right 7/17/2019  3:00 PM   Site Assessment Clean;Dry;Intact 7/17/2019  3:00 PM    Driveline Exit Site 1 7/16/2019  5:00 PM   Dressing Status Clean;Dry;Intact 7/17/2019  3:00 PM   Dressing Intervention New dressing 7/17/2019  3:00 PM   Performed By Family 7/16/2019  5:00 PM   Dressing Change Schedule Every other day 7/17/2019  3:00 PM   Dressing Change Due 07/18/19 7/17/2019  3:00 PM   Driveline [place] in use [place] 7/17/2019  7:01 AM   Condition CDI 7/17/2019  7:01 AM   Date changed 07/16/19 7/17/2019  7:01 AM   Power Source External DC 7/17/2019  3:00 PM   Equipment inventory at  Admission 7/17/2019  7:01 AM   Pump type HeartMate II 7/17/2019  7:01 AM   Primary Controller [id number] 7/16/2019  5:00 PM   Extra Controller [id number] 7/16/2019  5:00 PM   Battery 1 [id number] 7/16/2019  5:00 PM   Battery 2 [id number] 7/16/2019  5:00 PM   Battery 3 [id number] 7/16/2019  5:00 PM   Battery 4 [id number] 7/16/2019  5:00 PM   Battery Clips 4 7/16/2019  5:00 PM   Number of days: 496       Prev airway:    Prev airway: Placement Date: 10/31/14; Placement Time: 1205; Method of Intubation: Direct laryngoscopy, Glidescope; Inserted by: Anesthesia MD; Airway Device: Endotracheal Tube; Mask Ventilation: Mod Diff - oral; Intubated: Postinduction; Blade: Barrios #2; Airway Device Size: 7.5; Style: Cuffed; Cuff Inflation: Minimal occlusive pressure; Inflation Amount: 5.5; Placement Verified By: Auscultation, Capnometry, Fiberoptic bronchoscopic inspection; Grade: Grade IV; Complicating Factors: Large/Floppy epiglottis, Anterior larynx;    Since then he was successfully intubated by initial placement of an Airq LMA followed by 8.0 ETT w/ fiberoptic scope guidance on the first attempt.       Drips: None documented.      Patient Active Problem List   Diagnosis    Chronic combined systolic and diastolic heart failure    Cigarette nicotine dependence without complication    Alcohol abuse    Pulmonary nodule seen on imaging study    Biventricular implantable cardioverter-defibrillator in situ    PVT (paroxysmal  ventricular tachycardia)    High risk medications (amiodarone) long-term use    Dilated cardiomyopathy    CKD (chronic kidney disease), stage III    Hypertensive cardiovascular-renal disease, stage 1-4 or unspecified chronic kidney disease, with heart failure    LVAD (left ventricular assist device) present    Hyperglycemia    Hyperbilirubinemia    Anemia    Hypertension    Long term (current) use of anticoagulants    Left ventricular assist device (LVAD) complication    Pre-transplant evaluation for heart transplant    SOB (shortness of breath)    GIB (gastrointestinal bleeding)    Leukocytosis    Bloody diarrhea       Review of patient's allergies indicates:   Allergen Reactions    Lisinopril Anaphylaxis       Current Inpatient Medications:   allopurinol  100 mg Oral Daily    amiodarone  200 mg Oral Daily    ferrous gluconate  324 mg Oral Daily with breakfast    magnesium oxide  400 mg Oral TID    pantoprazole  40 mg Intravenous BID    polyethylene glycol  4,000 mL Oral Once       No current facility-administered medications on file prior to encounter.      Current Outpatient Medications on File Prior to Encounter   Medication Sig Dispense Refill    allopurinol (ZYLOPRIM) 100 MG tablet TAKE 1 TABLET BY MOUTH EVERY DAY 30 tablet 5    amiodarone (PACERONE) 200 MG Tab TAKE 1 TABLET (200 MG TOTAL) BY MOUTH ONCE DAILY. 30 tablet 4    amLODIPine (NORVASC) 10 MG tablet Take 1 tablet (10 mg total) by mouth once daily. 90 tablet 6    aspirin (ECOTRIN) 81 MG EC tablet Take 1 tablet (81 mg total) by mouth once daily.  0    bumetanide (BUMEX) 2 MG tablet Take 1 tablet (2 mg total) by mouth once daily. 90 tablet 6    doxazosin (CARDURA) 8 MG Tab Take 1 tablet (8 mg total) by mouth every 12 (twelve) hours. 60 tablet 11    ferrous gluconate (FERGON) 324 MG tablet Take 1 tablet (324 mg total) by mouth daily with breakfast. 90 tablet 6    hydrALAZINE (APRESOLINE) 100 MG tablet TAKE 1 TABLET (100 MG  TOTAL) BY MOUTH EVERY 8 (EIGHT) HOURS. 90 tablet 6    magnesium oxide (MAG-OX) 400 mg (241.3 mg magnesium) tablet TAKE 1 TABLET (400 MG TOTAL) BY MOUTH 3 (THREE) TIMES DAILY. 90 tablet 6    pantoprazole (PROTONIX) 40 MG tablet TAKE 1 TABLET (40 MG TOTAL) BY MOUTH ONCE DAILY. 30 tablet 11    potassium chloride (K-TAB) 20 mEq Take 1 tablet (20 mEq total) by mouth 2 (two) times daily. 60 tablet 11    spironolactone (ALDACTONE) 50 MG tablet Take 1 tablet (50 mg total) by mouth once daily. 90 tablet 3    warfarin (COUMADIN) 5 MG tablet TAKE 1 TABLETS (5MG) BY MOUTH ON TUES, THURS, SAT. TAKE 1.5 TABLETS (7.5MG) ALL OTHER DAYS. 45 tablet 11    sildenafil (VIAGRA) 100 MG tablet Take 1 tablet (100 mg total) by mouth daily as needed for Erectile Dysfunction. 10 tablet 1       Past Surgical History:   Procedure Laterality Date    CARDIAC CATHETERIZATION  Dec. 2012    CARDIAC DEFIBRILLATOR PLACEMENT Left     CRRT-D    CLOSURE-STERNAL WOUND N/A 3/10/2018    Performed [facility] at [facility] OR 2ND FLR    COLONOSCOPY N/A 3/6/2018    Performed by Alonso Bone MD at [facility] ENDO (2ND FLR)    INSERTION-ICD-BIVENTRICULAR N/A 10/31/2014    Performed [facility] at [facility] CATH LAB    Insertion-Left Ventricular Assist Device N/A 3/8/2018    Performed by [facility] at [facility] OR 2ND FLR    PLACEMENT-NEOPERICARDIUM  3/10/2018    Performed by [facility] at [facility] OR 2ND FLR    REVISION  3/9/2018    Performed by [facility] at [facility] OR 2ND FLR    WASHOUT- MEDIASTINUM  3/9/2018    Performed by [facility] at [facility] OR 2ND FLR       Social History     Socioeconomic History    Marital status:      Spouse name: Not on file    Number of children: Not on file    Years of education: Not on file    Highest education level: Not on file   Occupational History     Employer: [employer]    Social Needs    Financial resource strain: Not on file    Food insecurity:     Worry: Not on file      Inability: Not on file    Transportation needs:     Medical: Not on file     Non-medical: Not on file   Tobacco Use    Smoking status: Former Smoker     Packs/day: 1.00     Years: 31.00     Pack years: 31.00     Types: Cigarettes     Last attempt to quit: 2018     Years since quittin.5    Smokeless tobacco: Never Used    Tobacco comment: pt is quiting on his own - pt stated not qualified for program;  pt  quit on his own   Substance and Sexual Activity    Alcohol use: No     Alcohol/week: 0.0 oz     Comment: one fifth of gin or rum/week    Drug use: No    Sexual activity: Yes     Partners: Female     Birth control/protection: None     Comment: 10/5/17  with same partner 7 years    Lifestyle    Physical activity:     Days per week: Not on file     Minutes per session: Not on file    Stress: Not on file   Relationships    Social connections:     Talks on phone: Not on file     Gets together: Not on file     Attends Sikhism service: Not on file     Active member of club or organization: Not on file     Attends meetings of clubs or organizations: Not on file     Relationship status: Not on file   Other Topics Concern    Not on file   Social History Narrative    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       OBJECTIVE:     Vital Signs Range (Last 24H):  Temp:  [36.7 °C (98 °F)-37.3 °C (99.2 °F)]   Pulse:  []   Resp:  []   BP: ()/(0-72)   SpO2:  [92 %-100 %]       Significant Labs:  Lab Results   Component Value Date    WBC 23.38 (H) 2019    HGB 8.6 (L) 2019    HCT 25.9 (L) 2019     (L) 2019    CHOL 150 2018    TRIG 60 2018    HDL 85 (H) 2018    ALT 12 2019    AST 24 2019     2019    K 5.1 2019     2019    CREATININE 2.3 (H) 2019    BUN 31 (H) 2019    CO2 24 2019    TSH 4.039 (H) 2018    PSA 0.99 2018    INR 2.1 (H) 2019    HGBA1C 5.5 2018        Diagnostic Studies:     EKG: No recent studies available.    2D ECHO:  Results for orders placed or performed during the hospital encounter of 07/18/18   2D Echo w/ Color Flow Doppler   Result Value Ref Range    QEF 15 (A) 55 - 65    Mitral Valve Regurgitation SEVERE (A)     Est. PA Systolic Pressure 33.6     Mitral Valve Mobility NORMAL     Tricuspid Valve Regurgitation MILD          ASSESSMENT/PLAN:       Anesthesia Evaluation    I have reviewed the Patient Summary Reports.    I have reviewed the Nursing Notes.   I have reviewed the Medications.     Review of Systems  Anesthesia Hx:  Hx of Anesthetic complications  History of prior surgery of interest to airway management or planning: Previous anesthesia: General Airway issues documented on chart review include mask, difficult, difficult direct laryngoscopy, required fiberoptic intubation  Denies Family Hx of Anesthesia complications.  Personal Hx of Anesthesia complications  Difficult Intubation   Social:  Smoker    Hematology/Oncology:         -- Anemia: Denies Current/Recent Cancer   EENT/Dental:   denies chronic allergic rhinitis   Cardiovascular:   Exercise tolerance: poor Pacemaker Hypertension Valvular problems/Murmurs (mild MR), MR Past MI Denies CAD.    Denies CABG/stent.  Denies Dysrhythmias.  CHF ECG has been reviewed.    Pulmonary:   Denies Pneumonia Denies Asthma.  Denies Shortness of breath.  Denies Recent URI. Pulmonary nodule   Renal/:   Chronic Renal Disease (creatinine=1.3 (baseline)), CRI    Hepatic/GI:  Hepatic/GI Normal Denies PUD.  Denies Hiatal Hernia.  Denies GERD.    Neurological:  Neurology Normal Denies TIA.  Denies CVA. Denies Seizures.    Endocrine:  Endocrine Normal Denies Diabetes.    Psych:   Denies Psychiatric History.          Physical Exam  General:  Well nourished    Airway/Jaw/Neck:  Airway Findings: Mouth Opening: Normal Tongue: Normal  General Airway Assessment: Adult, Possible difficult intubation, Possible difficult  mask airway  Mallampati: III  Improves to II with phonation.  TM Distance: Normal, at least 6 cm  Jaw/Neck Findings:  Neck ROM: Normal ROM  Neck Findings: Normal M2-3, good airway opening, no loose dentition per pt, FROM, short thick neck    Dental:  Dental Findings: In tact, Periodontal disease, Mild   Chest/Lungs:  Chest/Lungs Findings: Clear to auscultation, Normal Respiratory Rate     Heart/Vascular:  Heart Findings: Rate: Normal  Rhythm: Regular Rhythm  Sounds: Normal  Vascular Findings: Normal    Abdomen:  Abdomen Findings: Normal    Musculoskeletal:  Musculoskeletal Findings: Normal   Skin:  Skin Findings: Normal    Mental Status:  Mental Status Findings:  Cooperative, Alert and Oriented         Anesthesia Plan  Type of Anesthesia, risks & benefits discussed:  Anesthesia Type:  general, MAC  Patient's Preference:   Intra-op Monitoring Plan: standard ASA monitors and arterial line  Intra-op Monitoring Plan Comments:   Post Op Pain Control Plan: per primary service following discharge from PACU and multimodal analgesia  Post Op Pain Control Plan Comments:   Induction:   IV  Beta Blocker:  Patient is not currently on a Beta-Blocker (No further documentation required).       Informed Consent: Patient understands risks and agrees with Anesthesia plan.  Questions answered. Anesthesia consent signed with patient.  ASA Score: 4     Day of Surgery Review of History & Physical:    H&P update referred to the provider.         Ready For Surgery From Anesthesia Perspective.

## 2019-07-17 NOTE — PROGRESS NOTES
Pt aaox3 while sitting up in bed.  LVAD history interrogated with no abnormal events noted.  LVAD numbers WNL. Pt denies any needs at this time.  Encouraged pt to notify nurse if they have any questions, problems or concerns for LVAD coordinator.  PT re-educated on importance of paging VAD coordinator in the event of bleeding.  PT also re-educated to page VAD coordinator on call when going to an ER.  Pt verbalized understanding and in agreement of plan. Will follow up with pt soon.

## 2019-07-17 NOTE — NURSING
Hgb unchanged from 7.9 two hours after receiving two units RBC. No active signs of bleeding and pt hasn't had a BM since last reported large/bloody BM this afternoon. Reported this to Dr. Wallace. MD to ordering blood transfusion and ordered CBC to be drawn two hours after pt transfusions are completed. WCBRENDA.

## 2019-07-17 NOTE — ASSESSMENT & PLAN NOTE
-Heartmate 2, implanted 3/8/18 as DT  -INR therapeutic today, 2.1 with goal 2-3. Hold coumadin due to GIB  -Hold ASA for now  -MAP goal 60-80,  will hold anti-HTNs for now given active GIB (spironolactone, doxazosin, amlodipine, hydralazine)  -Appears euvolemic, monitor with blood transfusions. Lasix 40 IV x 1 given overnight between transfusions. On Bumex 4 at home  -Last Echo 1/2019: LVEDD 9.1, LVEF 10%. Will get Echo     Procedure: Device Interrogation Including analysis of device parameters  Current Settings: Ventricular Assist Device  Review of device function is stable/unstable stable    TXP LVAD INTERROGATIONS 7/17/2019 7/17/2019 7/17/2019 7/17/2019 7/17/2019 7/17/2019 7/17/2019   Type HeartMate II HeartMate II HeartMate II HeartMate II HeartMate II HeartMate II HeartMate II   Flow 5.1 5.5 5.2 5 4.7 5 5   Speed 9800 9800 9800 9800 9800 9800 9800   PI 3.6 3.4 3.1 4 2.7 4.9 4.5   Power (Jackson) 6.2 6.4 6.4 6.2 5.9 6.1 6.2   LSL 9400 9400 9400 9400 9400 9400 9400   Pulsatility Intermittent pulse Intermittent pulse Intermittent pulse Intermittent pulse Intermittent pulse Intermittent pulse Intermittent pulse

## 2019-07-17 NOTE — SUBJECTIVE & OBJECTIVE
Interval History: Sitting up on side of bed this am. No longer getting LH or dizzy but is very fatigued. Last BM was 0130 this am, bloody diarrhea. Had difficulty drinking Golytley last night due to N/V. Tried to drink more after getting Phenergan last night but still caused nausea    Continuous Infusions:  Scheduled Meds:   allopurinol  100 mg Oral Daily    amiodarone  200 mg Oral Daily    ferrous gluconate  324 mg Oral Daily with breakfast    magnesium oxide  400 mg Oral TID    pantoprazole  40 mg Intravenous BID     PRN Meds:sodium chloride, sodium chloride, sodium chloride, sodium chloride, promethazine (PHENERGAN) IVPB    Review of patient's allergies indicates:   Allergen Reactions    Lisinopril Anaphylaxis     Objective:     Vital Signs (Most Recent):  Temp: 98.7 °F (37.1 °C) (07/17/19 0730)  Pulse: 75 (07/17/19 1200)  Resp: 20 (07/17/19 1200)  BP: 98/68 (07/17/19 1200)  SpO2: 100 % (07/17/19 1100) Vital Signs (24h Range):  Temp:  [98 °F (36.7 °C)-99.8 °F (37.7 °C)] 98.7 °F (37.1 °C)  Pulse:  [] 75  Resp:  [] 20  SpO2:  [92 %-100 %] 100 %  BP: ()/(0-72) 98/68     Patient Vitals for the past 72 hrs (Last 3 readings):   Weight   07/16/19 1915 117.9 kg (260 lb)   07/16/19 1857 117.9 kg (260 lb)   07/16/19 1700 117.9 kg (260 lb)     Body mass index is 34.3 kg/m².      Intake/Output Summary (Last 24 hours) at 7/17/2019 1235  Last data filed at 7/17/2019 1100  Gross per 24 hour   Intake 4065 ml   Output 2000 ml   Net 2065 ml       Hemodynamic Parameters:       Telemetry: Reviewed    Physical Exam   Constitutional: He is oriented to person, place, and time. He appears well-developed and well-nourished.   Neck: Normal range of motion. Neck supple. No JVD present.   Cardiovascular: Normal rate and regular rhythm.   Normal VAD hum   Pulmonary/Chest: Effort normal and breath sounds normal. He has no wheezes. He has no rales.   Abdominal: Soft. Bowel sounds are normal. There is no tenderness.    Musculoskeletal: He exhibits no edema.   Neurological: He is alert and oriented to person, place, and time.   Skin: Skin is warm and dry.       Significant Labs:  CBC:  Recent Labs   Lab 07/16/19  2045 07/17/19  0414 07/17/19  0816   WBC 12.09 28.36* 23.38*   RBC 2.76* 2.84* 2.89*   HGB 7.9* 8.3* 8.6*   HCT 25.4* 26.0* 25.9*    145* 140*   MCV 92 92 90   MCH 28.6 29.2 29.8   MCHC 31.1* 31.9* 33.2     BNP:  Recent Labs   Lab 07/16/19  1305 07/17/19  0414   BNP 47 153*     CMP:  Recent Labs   Lab 07/16/19  1305 07/17/19  0414   * 117*   CALCIUM 8.9 8.2*   ALBUMIN 3.2* 2.9*   PROT 6.0 5.3*    137   K 4.5 5.1   CO2 24 24    104   BUN 28* 31*   CREATININE 2.0* 2.3*   ALKPHOS 60 52*   ALT 15 12   AST 22 24   BILITOT 0.5 1.6*      Coagulation:   Recent Labs   Lab 07/16/19  1305 07/17/19  0414   INR 2.7* 2.1*   APTT  --  25.9     LDH:  Recent Labs   Lab 07/16/19  1305 07/17/19  0414    288*     Microbiology:  Microbiology Results (last 7 days)     Procedure Component Value Units Date/Time    E. coli 0157 antigen [[id number]] Collected:  07/16/19 1731    Order Status:  Completed Specimen:  Stool Updated:  07/17/19 1046     Shiga Toxin 1 E.coli Negative     Shiga Toxin 2 E.coli Negative    Clostridium difficile EIA [[id number]] Collected:  07/17/19 0904    Order Status:  Sent Specimen:  Stool Updated:  07/17/19 0943    Stool culture [[id number]] Collected:  07/16/19 1731    Order Status:  Sent Specimen:  Stool Updated:  07/16/19 1751          I have reviewed all pertinent labs within the past 24 hours.    Estimated Creatinine Clearance: 50.5 mL/min (A) (based on SCr of 2.3 mg/dL (H)).    Diagnostic Results:  I have reviewed all pertinent imaging results/findings within the past 24 hours.

## 2019-07-17 NOTE — SIGNIFICANT EVENT
Pt had dark red/liquid BM x3. Estimating ~500 mL total. 200 mL emesis x1, but it was all PO go-lytely. First blood tx started at 2345. BP stable, but pt c/o SOB. Reported to Dr. Wallace. MD ordered 40 mg lasix IVP x1 to be given after 1/3 blood tx completed. MD also ordered zofran 4mg Q6 PRN for n/v. Orders placed. Will notifiy MD if pt has any more BMs per request. WCTM closely.

## 2019-07-17 NOTE — PLAN OF CARE
Problem: Adult Inpatient Plan of Care  Goal: Plan of Care Review  Outcome: Ongoing (interventions implemented as appropriate)  SEE PREVIOUS NOTE.  Pt transfused 3 units PRBCs (5 total since admit). CT abd done. Pt drank first half of bowel prep. States he can't tolerate drinking second half this AM. Giving PRN phenergan prior to pt attempting to drink AM portion of go-lytely. See flowsheets for vital signs and assessments. See below for updates on progress made.       Neuro: AAOx4, slept 3-4 hours    Cardiovascular: HR 80s-90s; dopplers 78-86; VAD: flows 4s, speed 9800, PI 3s; putnam ~6      Pulmonary: 2L NC    Gastrointestinal: NPO besides bowel prep; bloody/dark red/liquid BM x3 via bedside commode; 200 mL emesis (not bloody)    Genitourinary: voided 1600 mL via urinal, given 40 mg lasix x1 after 1/3 blood tx    Endocrine: AM labs drawn after 2/3 PRBC tx; CBC TO BE COLLECTED TWO HOURS AFTER 3/3 BLOOD TX @ 0800    Integumentary/other: HAPI prevention bundle in place; VAD dressing changed on day shift    Infusions: 3 PRBCs    POC: EGD and colonoscopy     Patient progressing towards goals as tolerated, plan of care communicated and reviewed with Tim Richards. All concerns addressed. Will continue to monitor.

## 2019-07-17 NOTE — TREATMENT PLAN
GI Treatment Plan    Patient's c. Diff negative. Will proceed with EGD and colonoscopy tomorrow.    - Clear liquid diet today  - Golytely split dose bowel prep this evening  - Zofran or Phenergan PRN  - NPO after MN  - EGD and colonoscopy tomorrow    Patel Cruz MD PGY-VI  Gastroenterology Fellow  [facility]  P [phone]

## 2019-07-17 NOTE — PLAN OF CARE
Problem: Adult Inpatient Plan of Care  Goal: Plan of Care Review  Outcome: Ongoing (interventions implemented as appropriate)  No acute events throughout shift. Pt AAOx4 and afebrile. All VSS this shift. LVAD without alarms, numbers WNL. Dopplers 80-72 Pt did not get EGD done today, scheduled for tomorrow. 2 dark red BMs today. As well as 2 units RBCs given. Pt been on clear liquid diet and will be NPO again tonight after midnight for scope tomorrow. Pt UO 700cc. Echo done this shift. POC reviewed with patient, all questions and concerns addressed. Will continue to monitor.

## 2019-07-17 NOTE — PROGRESS NOTES
07/17/2019  Casper Harris    Current provider:  Antonio Hadley Jr.,*      I, Casper Harris, rounded on Tim Richards to ensure all mechanical assist device settings (IABP or VAD) were appropriate and all parameters were within limits.  I was able to ensure all back up equipment was present, the staff had no issues, and the Perfusion Department daily rounding was complete.    6:56 AM

## 2019-07-17 NOTE — ASSESSMENT & PLAN NOTE
-Acute GIB with hematochezia  -Hgb 7.9 on admit, down from 14.5 ~ 2 weeks ago  -Transfused 5 units PRBCs total since admission without appropriate response in H&H. Hgb 8.6 today from 7.9. Will transfuse another 2 units PRBCs today  -Hold home coumadin and ASA for now; will not reverse at this time. INR 2.1 today  -GI consulted- planned for EGD/Cscope today but now want to r/o C Diff which is pending.   -CT abdomen 7/16/19- no colitis  -Protonix 80 mg IV x 1 on admit then Protonix 40 IV BID  -Hold antiHTNs and Bumex for now in setting of bleed

## 2019-07-17 NOTE — PROGRESS NOTES
Admit Note     Met with patient to assess needs. Patient is a 53 y.o.  male, admitted for gastrointestinal bleeding and anemia.  Pt received an LVAD on 3/8/18.  Pt does his own dressing changes.       Patient admitted from emergency on 7/16/2019 .  At this time, patient presents as alert and oriented x 4, good eye contact, calm and communicative.  At this time, patients caregiver is not currently in attendance.     Household/Family Systems     Patient resides with patient's wife, step son (age 28) and three grandsons every other weekend at     [address]  [place] [address].        Support system includes spouse and adult step son.    Patient does not have dependents that are need of being cared for.   When grandkids are visiting they are cared for by pt's spouse.      Patients primary caregiver is self with support from spouse as needed.    Pt's cell:  [phone]  Kelly Richards (spouse) [phone]  Pt did not want to list any additional emergency contacts.       During admission, patient's caregiver plans to stay at home.  Confirmed patient and patients caregivers do have access to reliable transportation.    Cognitive Status/Learning     Patient reports reading ability as college and states patient does not have difficulty with reading, writing, seeing, hearing, comprehension, learning and memory. Pt wears glasses.    Patient reports patient learns best by reading.     Needed: No.   Highest education level: Attended College/Technical School    Vocation/Disability   .  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   Pt reports he may need a work excuse.     Adherence     Patient reports a high level of adherence to patients health care regimen.  Adherence counseling and education provided. Patient verbalizes understanding.    Substance Use    Patient reports the following  substance usage.    Tobacco: none.  Pt started smoking at age 17 until 1/10/18.  Pt used to smoke 1/2 ppd.  Alcohol: none.  Pt reports no alcohol since 2018.   Illicit Drugs/Non-prescribed Medications: none, patient denies any use.  Patient states clear understanding of the potential impact of substance use.  Substance abstinence/cessation counseling, education and resources provided and reviewed.     Services Utilizing/ADLS    Infusion Service: Prior to admission, patient utilizing? no  Home Health: Prior to admission, patient utilizing? no  DME: Prior to admission, yes cane  Pulmonary/Cardiac Rehab: Prior to admission, no  Dialysis:  Prior to admission, no  Transplant Specialty Pharmacy:  Prior to admission, no.    Prior to admission, patient reports patient was independent with ADLS and was driving.Pt's spouse also drives.  Patient reports patient is not able to care for self at this time due to compromised medical condition (as documented in medical record) and physical weakness..  Patient indicates a willingness to care for self once medically cleared to do so.    Insurance/Medications    Insured by   Payor/Plan Subscr  Sex Relation Sub. Ins. ID Effective Group Num   1. [employer] - AET[employer]* JESUS PHELPS* 1965 Male  [id number]                                   [address]   2. [employer] - [employer]* JESUS PHELPS* 1965 Male  [id number]                                    [address]      Primary Insurance (for UNOS reporting): Private Insurance  Secondary Insurance (for UNOS reporting): None    Patient reports patient is able to obtain and afford medications at this time and at time of discharge.    Living Will/Healthcare Power of     Patient states patient does not have a LW and/or HCPA.   provided education regarding LW and HCPA and the completion of forms.    Coping/Mental Health    Patient is coping adequately with the aid of  family  members.   Patient denies mental health difficulties.  Pt reports he and his wife will fly to [place] on July 23 rd to spend the week with their new grandchild.  Pt reports once he returns from his trip he plans to talk to team about transplant listing.  Pt reports his spouse and his dion Pak will be his caregivers.    Worker provided general support.     Discharge Planning    At time of discharge, patient plans to return to patient's home under the care of self and spouse.  Patients spouse will transport patient.  Per rounds today, expected discharge date has not been medically determined at this time. Patient and patients caregiver  verbalize understanding and are involved in treatment planning and discharge process.    Additional Concerns    Patient is being followed for needs, education, resources, information, emotional support, supportive counseling, and for supportive and skilled discharge plan of care.  providing ongoing psychosocial support, education, resources and d/c planning as needed.  SW remains available. Patient denies additional needs and/or concerns at this time. Patient verbalizes understanding and agreement with information reviewed, social work availability, and how to access available resources as needed.

## 2019-07-18 ENCOUNTER — CLINICAL SUPPORT (OUTPATIENT)
Dept: CARDIOLOGY | Facility: CLINIC | Age: 54
DRG: 394 | End: 2019-07-18
Payer: COMMERCIAL

## 2019-07-18 ENCOUNTER — ANESTHESIA (OUTPATIENT)
Dept: ENDOSCOPY | Facility: HOSPITAL | Age: 54
DRG: 394 | End: 2019-07-18
Payer: COMMERCIAL

## 2019-07-18 PROBLEM — F17.200 SMOKER: Status: ACTIVE | Noted: 2019-07-18

## 2019-07-18 PROBLEM — D69.6 THROMBOCYTOPENIA, UNSPECIFIED: Status: ACTIVE | Noted: 2019-07-18

## 2019-07-18 LAB
ANION GAP SERPL CALC-SCNC: 7 MMOL/L (ref 8–16)
APTT BLDCRRT: 32.7 SEC (ref 21–32)
BASOPHILS # BLD AUTO: 0.01 K/UL (ref 0–0.2)
BASOPHILS # BLD AUTO: 0.02 K/UL (ref 0–0.2)
BASOPHILS NFR BLD: 0.1 % (ref 0–1.9)
BASOPHILS NFR BLD: 0.2 % (ref 0–1.9)
BLD PROD TYP BPU: NORMAL
BLOOD UNIT EXPIRATION DATE: NORMAL
BLOOD UNIT TYPE CODE: 5100
BLOOD UNIT TYPE: NORMAL
BUN SERPL-MCNC: 25 MG/DL (ref 6–20)
CALCIUM SERPL-MCNC: 8.2 MG/DL (ref 8.7–10.5)
CHLORIDE SERPL-SCNC: 104 MMOL/L (ref 95–110)
CO2 SERPL-SCNC: 24 MMOL/L (ref 23–29)
CODING SYSTEM: NORMAL
CREAT SERPL-MCNC: 1.7 MG/DL (ref 0.5–1.4)
DIFFERENTIAL METHOD: ABNORMAL
DIFFERENTIAL METHOD: ABNORMAL
DISPENSE STATUS: NORMAL
EOSINOPHIL # BLD AUTO: 0 K/UL (ref 0–0.5)
EOSINOPHIL # BLD AUTO: 0.1 K/UL (ref 0–0.5)
EOSINOPHIL NFR BLD: 0.1 % (ref 0–8)
EOSINOPHIL NFR BLD: 1.2 % (ref 0–8)
ERYTHROCYTE [DISTWIDTH] IN BLOOD BY AUTOMATED COUNT: 15.2 % (ref 11.5–14.5)
ERYTHROCYTE [DISTWIDTH] IN BLOOD BY AUTOMATED COUNT: 15.5 % (ref 11.5–14.5)
EST. GFR  (AFRICAN AMERICAN): 52.4 ML/MIN/1.73 M^2
EST. GFR  (NON AFRICAN AMERICAN): 45.4 ML/MIN/1.73 M^2
FERRITIN SERPL-MCNC: 231 NG/ML (ref 20–300)
FOLATE SERPL-MCNC: 5.4 NG/ML (ref 4–24)
GLUCOSE SERPL-MCNC: 84 MG/DL (ref 70–110)
HAPTOGLOB SERPL-MCNC: <10 MG/DL (ref 30–250)
HCT VFR BLD AUTO: 27.7 % (ref 40–54)
HCT VFR BLD AUTO: 29.6 % (ref 40–54)
HGB BLD-MCNC: 8.8 G/DL (ref 14–18)
HGB BLD-MCNC: 9.8 G/DL (ref 14–18)
HIV1+2 IGG SERPL QL IA.RAPID: NEGATIVE
IMM GRANULOCYTES # BLD AUTO: 0.1 K/UL (ref 0–0.04)
IMM GRANULOCYTES # BLD AUTO: 0.11 K/UL (ref 0–0.04)
IMM GRANULOCYTES NFR BLD AUTO: 0.9 % (ref 0–0.5)
IMM GRANULOCYTES NFR BLD AUTO: 1.2 % (ref 0–0.5)
INR PPP: 2.1 (ref 0.8–1.2)
LDH SERPL L TO P-CCNC: 256 U/L (ref 110–260)
LYMPHOCYTES # BLD AUTO: 0.6 K/UL (ref 1–4.8)
LYMPHOCYTES # BLD AUTO: 1 K/UL (ref 1–4.8)
LYMPHOCYTES NFR BLD: 6 % (ref 18–48)
LYMPHOCYTES NFR BLD: 8.4 % (ref 18–48)
MAGNESIUM SERPL-MCNC: 2.1 MG/DL (ref 1.6–2.6)
MCH RBC QN AUTO: 29.1 PG (ref 27–31)
MCH RBC QN AUTO: 29.5 PG (ref 27–31)
MCHC RBC AUTO-ENTMCNC: 31.8 G/DL (ref 32–36)
MCHC RBC AUTO-ENTMCNC: 33.1 G/DL (ref 32–36)
MCV RBC AUTO: 89 FL (ref 82–98)
MCV RBC AUTO: 92 FL (ref 82–98)
MONOCYTES # BLD AUTO: 0.8 K/UL (ref 0.3–1)
MONOCYTES # BLD AUTO: 1.4 K/UL (ref 0.3–1)
MONOCYTES NFR BLD: 11.8 % (ref 4–15)
MONOCYTES NFR BLD: 8.4 % (ref 4–15)
NEUTROPHILS # BLD AUTO: 7.8 K/UL (ref 1.8–7.7)
NEUTROPHILS # BLD AUTO: 8.9 K/UL (ref 1.8–7.7)
NEUTROPHILS NFR BLD: 77.6 % (ref 38–73)
NEUTROPHILS NFR BLD: 84.1 % (ref 38–73)
NRBC BLD-RTO: 0 /100 WBC
NRBC BLD-RTO: 0 /100 WBC
PATH REV BLD -IMP: NORMAL
PHOSPHATE SERPL-MCNC: 2.6 MG/DL (ref 2.7–4.5)
PLATELET # BLD AUTO: 104 K/UL (ref 150–350)
PLATELET # BLD AUTO: 97 K/UL (ref 150–350)
PMV BLD AUTO: 10.5 FL (ref 9.2–12.9)
PMV BLD AUTO: 10.6 FL (ref 9.2–12.9)
POTASSIUM SERPL-SCNC: 4.4 MMOL/L (ref 3.5–5.1)
PROTHROMBIN TIME: 19.9 SEC (ref 9–12.5)
RBC # BLD AUTO: 3.02 M/UL (ref 4.6–6.2)
RBC # BLD AUTO: 3.32 M/UL (ref 4.6–6.2)
RETICS/RBC NFR AUTO: 5.1 % (ref 0.4–2)
SODIUM SERPL-SCNC: 135 MMOL/L (ref 136–145)
T4 FREE SERPL-MCNC: 1.03 NG/DL (ref 0.71–1.51)
TRANS ERYTHROCYTES VOL PATIENT: NORMAL ML
TSH SERPL DL<=0.005 MIU/L-ACNC: 0.07 UIU/ML (ref 0.4–4)
VIT B12 SERPL-MCNC: 431 PG/ML (ref 210–950)
WBC # BLD AUTO: 11.47 K/UL (ref 3.9–12.7)
WBC # BLD AUTO: 9.21 K/UL (ref 3.9–12.7)

## 2019-07-18 PROCEDURE — 80048 BASIC METABOLIC PNL TOTAL CA: CPT

## 2019-07-18 PROCEDURE — 25000003 PHARM REV CODE 250: Performed by: STUDENT IN AN ORGANIZED HEALTH CARE EDUCATION/TRAINING PROGRAM

## 2019-07-18 PROCEDURE — 63600175 PHARM REV CODE 636 W HCPCS: Performed by: PHYSICIAN ASSISTANT

## 2019-07-18 PROCEDURE — 99291 CRITICAL CARE FIRST HOUR: CPT | Mod: ,,, | Performed by: NURSE PRACTITIONER

## 2019-07-18 PROCEDURE — 82728 ASSAY OF FERRITIN: CPT

## 2019-07-18 PROCEDURE — 84439 ASSAY OF FREE THYROXINE: CPT

## 2019-07-18 PROCEDURE — 93750 INTERROGATION VAD IN PERSON: CPT | Mod: ,,, | Performed by: INTERNAL MEDICINE

## 2019-07-18 PROCEDURE — 82746 ASSAY OF FOLIC ACID SERUM: CPT

## 2019-07-18 PROCEDURE — 84100 ASSAY OF PHOSPHORUS: CPT

## 2019-07-18 PROCEDURE — 85610 PROTHROMBIN TIME: CPT

## 2019-07-18 PROCEDURE — 83010 ASSAY OF HAPTOGLOBIN QUANT: CPT

## 2019-07-18 PROCEDURE — 93975 VASCULAR STUDY: CPT | Mod: 26,,, | Performed by: INTERNAL MEDICINE

## 2019-07-18 PROCEDURE — 86803 HEPATITIS C AB TEST: CPT

## 2019-07-18 PROCEDURE — C9113 INJ PANTOPRAZOLE SODIUM, VIA: HCPCS | Performed by: PHYSICIAN ASSISTANT

## 2019-07-18 PROCEDURE — 37000009 HC ANESTHESIA EA ADD 15 MINS: Performed by: INTERNAL MEDICINE

## 2019-07-18 PROCEDURE — 63600175 PHARM REV CODE 636 W HCPCS: Performed by: NURSE ANESTHETIST, CERTIFIED REGISTERED

## 2019-07-18 PROCEDURE — 85025 COMPLETE CBC W/AUTO DIFF WBC: CPT | Mod: 91

## 2019-07-18 PROCEDURE — 25000003 PHARM REV CODE 250: Performed by: NURSE PRACTITIONER

## 2019-07-18 PROCEDURE — 45378 PR COLONOSCOPY,DIAGNOSTIC: ICD-10-PCS | Mod: ,,, | Performed by: INTERNAL MEDICINE

## 2019-07-18 PROCEDURE — 25000003 PHARM REV CODE 250: Performed by: NURSE ANESTHETIST, CERTIFIED REGISTERED

## 2019-07-18 PROCEDURE — 83735 ASSAY OF MAGNESIUM: CPT

## 2019-07-18 PROCEDURE — 87340 HEPATITIS B SURFACE AG IA: CPT

## 2019-07-18 PROCEDURE — 27000248 HC VAD-ADDITIONAL DAY

## 2019-07-18 PROCEDURE — 86703 HIV-1/HIV-2 1 RESULT ANTBDY: CPT

## 2019-07-18 PROCEDURE — 45378 DIAGNOSTIC COLONOSCOPY: CPT | Performed by: INTERNAL MEDICINE

## 2019-07-18 PROCEDURE — D9220A PRA ANESTHESIA: Mod: ANES,,, | Performed by: ANESTHESIOLOGY

## 2019-07-18 PROCEDURE — D9220A PRA ANESTHESIA: ICD-10-PCS | Mod: CRNA,,, | Performed by: NURSE ANESTHETIST, CERTIFIED REGISTERED

## 2019-07-18 PROCEDURE — 43235 PR EGD, FLEX, DIAGNOSTIC: ICD-10-PCS | Mod: 51,,, | Performed by: INTERNAL MEDICINE

## 2019-07-18 PROCEDURE — 36430 TRANSFUSION BLD/BLD COMPNT: CPT

## 2019-07-18 PROCEDURE — 93975 CV US MESENTERIC ARTERIAL: ICD-10-PCS | Mod: 26,,, | Performed by: INTERNAL MEDICINE

## 2019-07-18 PROCEDURE — 82607 VITAMIN B-12: CPT

## 2019-07-18 PROCEDURE — 84443 ASSAY THYROID STIM HORMONE: CPT

## 2019-07-18 PROCEDURE — 85730 THROMBOPLASTIN TIME PARTIAL: CPT

## 2019-07-18 PROCEDURE — D9220A PRA ANESTHESIA: ICD-10-PCS | Mod: ANES,,, | Performed by: ANESTHESIOLOGY

## 2019-07-18 PROCEDURE — P9021 RED BLOOD CELLS UNIT: HCPCS

## 2019-07-18 PROCEDURE — 37000008 HC ANESTHESIA 1ST 15 MINUTES: Performed by: INTERNAL MEDICINE

## 2019-07-18 PROCEDURE — 43235 EGD DIAGNOSTIC BRUSH WASH: CPT | Performed by: INTERNAL MEDICINE

## 2019-07-18 PROCEDURE — 43246 EGD PLACE GASTROSTOMY TUBE: CPT

## 2019-07-18 PROCEDURE — 99291 PR CRITICAL CARE, E/M 30-74 MINUTES: ICD-10-PCS | Mod: ,,, | Performed by: NURSE PRACTITIONER

## 2019-07-18 PROCEDURE — 43235 EGD DIAGNOSTIC BRUSH WASH: CPT | Mod: 51,,, | Performed by: INTERNAL MEDICINE

## 2019-07-18 PROCEDURE — D9220A PRA ANESTHESIA: Mod: CRNA,,, | Performed by: NURSE ANESTHETIST, CERTIFIED REGISTERED

## 2019-07-18 PROCEDURE — 93308 TTE F-UP OR LMTD: CPT

## 2019-07-18 PROCEDURE — 94761 N-INVAS EAR/PLS OXIMETRY MLT: CPT

## 2019-07-18 PROCEDURE — 45378 DIAGNOSTIC COLONOSCOPY: CPT | Mod: ,,, | Performed by: INTERNAL MEDICINE

## 2019-07-18 PROCEDURE — 20000000 HC ICU ROOM

## 2019-07-18 PROCEDURE — 83615 LACTATE (LD) (LDH) ENZYME: CPT

## 2019-07-18 PROCEDURE — 93750 PR INTERROGATE VENT ASSIST DEV, IN PERSON, W PHYSICIAN ANALYSIS: ICD-10-PCS | Mod: ,,, | Performed by: INTERNAL MEDICINE

## 2019-07-18 PROCEDURE — 86704 HEP B CORE ANTIBODY TOTAL: CPT

## 2019-07-18 PROCEDURE — 85045 AUTOMATED RETICULOCYTE COUNT: CPT

## 2019-07-18 RX ORDER — KETAMINE HYDROCHLORIDE 10 MG/ML
INJECTION, SOLUTION INTRAMUSCULAR; INTRAVENOUS
Status: DISCONTINUED | OUTPATIENT
Start: 2019-07-18 | End: 2019-07-18

## 2019-07-18 RX ORDER — ETOMIDATE 2 MG/ML
INJECTION INTRAVENOUS
Status: DISCONTINUED | OUTPATIENT
Start: 2019-07-18 | End: 2019-07-18

## 2019-07-18 RX ORDER — PROPOFOL 10 MG/ML
VIAL (ML) INTRAVENOUS
Status: DISCONTINUED | OUTPATIENT
Start: 2019-07-18 | End: 2019-07-18

## 2019-07-18 RX ORDER — MIDAZOLAM HYDROCHLORIDE 1 MG/ML
INJECTION, SOLUTION INTRAMUSCULAR; INTRAVENOUS
Status: DISCONTINUED | OUTPATIENT
Start: 2019-07-18 | End: 2019-07-18

## 2019-07-18 RX ORDER — LIDOCAINE HYDROCHLORIDE 20 MG/ML
SOLUTION OROPHARYNGEAL
Status: DISCONTINUED | OUTPATIENT
Start: 2019-07-18 | End: 2019-07-18

## 2019-07-18 RX ORDER — SODIUM CHLORIDE 9 MG/ML
INJECTION, SOLUTION INTRAVENOUS CONTINUOUS PRN
Status: DISCONTINUED | OUTPATIENT
Start: 2019-07-18 | End: 2019-07-18

## 2019-07-18 RX ORDER — LIDOCAINE HCL/PF 100 MG/5ML
SYRINGE (ML) INTRAVENOUS
Status: DISCONTINUED | OUTPATIENT
Start: 2019-07-18 | End: 2019-07-18

## 2019-07-18 RX ORDER — PROPOFOL 10 MG/ML
INJECTION, EMULSION INTRAVENOUS
Status: COMPLETED
Start: 2019-07-18 | End: 2019-07-18

## 2019-07-18 RX ORDER — GLYCOPYRROLATE 0.2 MG/ML
INJECTION INTRAMUSCULAR; INTRAVENOUS
Status: DISCONTINUED | OUTPATIENT
Start: 2019-07-18 | End: 2019-07-18

## 2019-07-18 RX ORDER — WARFARIN 2 MG/1
2 TABLET ORAL DAILY
Status: DISCONTINUED | OUTPATIENT
Start: 2019-07-18 | End: 2019-07-19

## 2019-07-18 RX ORDER — PROPOFOL 10 MG/ML
VIAL (ML) INTRAVENOUS CONTINUOUS PRN
Status: DISCONTINUED | OUTPATIENT
Start: 2019-07-18 | End: 2019-07-18

## 2019-07-18 RX ADMIN — MAGNESIUM OXIDE TAB 400 MG (241.3 MG ELEMENTAL MG) 400 MG: 400 (241.3 MG) TAB at 08:07

## 2019-07-18 RX ADMIN — AMIODARONE HYDROCHLORIDE 200 MG: 200 TABLET ORAL at 08:07

## 2019-07-18 RX ADMIN — FERROUS GLUCONATE TAB 324 MG (37.5 MG ELEMENTAL IRON) 324 MG: 324 (37.5 FE) TAB at 08:07

## 2019-07-18 RX ADMIN — KETAMINE HYDROCHLORIDE 10 MG: 10 INJECTION, SOLUTION INTRAMUSCULAR; INTRAVENOUS at 12:07

## 2019-07-18 RX ADMIN — PANTOPRAZOLE SODIUM 40 MG: 40 INJECTION, POWDER, FOR SOLUTION INTRAVENOUS at 09:07

## 2019-07-18 RX ADMIN — LIDOCAINE HYDROCHLORIDE 15 ML: 20 SOLUTION ORAL; TOPICAL at 11:07

## 2019-07-18 RX ADMIN — MESALAMINE 1000 MG: 250 CAPSULE ORAL at 10:07

## 2019-07-18 RX ADMIN — GLYCOPYRROLATE 0.1 MG: 0.2 INJECTION, SOLUTION INTRAMUSCULAR; INTRAVENOUS at 11:07

## 2019-07-18 RX ADMIN — MAGNESIUM OXIDE TAB 400 MG (241.3 MG ELEMENTAL MG) 400 MG: 400 (241.3 MG) TAB at 03:07

## 2019-07-18 RX ADMIN — WARFARIN SODIUM 2 MG: 2 TABLET ORAL at 04:07

## 2019-07-18 RX ADMIN — PROPOFOL 40 MG: 10 INJECTION, EMULSION INTRAVENOUS at 12:07

## 2019-07-18 RX ADMIN — KETAMINE HYDROCHLORIDE 20 MG: 10 INJECTION, SOLUTION INTRAMUSCULAR; INTRAVENOUS at 11:07

## 2019-07-18 RX ADMIN — SODIUM CHLORIDE: 0.9 INJECTION, SOLUTION INTRAVENOUS at 11:07

## 2019-07-18 RX ADMIN — LIDOCAINE HYDROCHLORIDE 60 MG: 20 INJECTION, SOLUTION INTRAVENOUS at 12:07

## 2019-07-18 RX ADMIN — PROPOFOL 100 MCG/KG/MIN: 10 INJECTION, EMULSION INTRAVENOUS at 12:07

## 2019-07-18 RX ADMIN — ETOMIDATE 8 MG: 2 INJECTION, SOLUTION INTRAVENOUS at 12:07

## 2019-07-18 RX ADMIN — ALLOPURINOL 100 MG: 100 TABLET ORAL at 08:07

## 2019-07-18 RX ADMIN — PANTOPRAZOLE SODIUM 40 MG: 40 INJECTION, POWDER, FOR SOLUTION INTRAVENOUS at 08:07

## 2019-07-18 RX ADMIN — MIDAZOLAM HYDROCHLORIDE 2 MG: 1 INJECTION, SOLUTION INTRAMUSCULAR; INTRAVENOUS at 11:07

## 2019-07-18 RX ADMIN — MAGNESIUM OXIDE TAB 400 MG (241.3 MG ELEMENTAL MG) 400 MG: 400 (241.3 MG) TAB at 09:07

## 2019-07-18 RX ADMIN — MESALAMINE 1000 MG: 250 CAPSULE ORAL at 04:07

## 2019-07-18 NOTE — TRANSFER OF CARE
Anesthesia Transfer of Care Note    Patient: Tim Richards    Procedure(s) Performed: Procedure(s) (LRB):  EGD (ESOPHAGOGASTRODUODENOSCOPY) (N/A)  COLONOSCOPY (N/A)    Patient location: ICU    Anesthesia Type: general    Transport from OR: Transported from OR on 2-3 L/min O2 by NC with adequate spontaneous ventilation    Post pain: adequate analgesia    Post assessment: no apparent anesthetic complications and tolerated procedure well    Post vital signs: stable    Level of consciousness: sedated    Nausea/Vomiting: no nausea/vomiting    Complications: none    Transfer of care protocol was followed      Last vitals:   Visit Vitals  BP (!) (P) 109/58 (BP Location: Left arm, Patient Position: Lying)   Pulse 68   Temp 36.8 °C (98.2 °F) (Oral)   Resp (!) 23   Ht 6' (1.829 m)   Wt 119.9 kg (264 lb 5.3 oz)   SpO2 100%   BMI 35.85 kg/m²

## 2019-07-18 NOTE — SIGNIFICANT EVENT
Patient was feeling very unwell (weak, fatigued), also was becoming hypotensive. H/H had decreased from this morning 8.3 to 7.8 despite another 2 units PRBC transfusion. Has had another bloody BM during night shift. Transfusing 3 more units, so far 2 units transfused, BP has normalized, symptoms improving.    Hardeep Wallace MD  PGY-V

## 2019-07-18 NOTE — NURSING
Notified Dr Wallace that patient refuses to drink anymore bowel prep. Instructed to educate and retry. WCTM.

## 2019-07-18 NOTE — PROGRESS NOTES
Pt asleep with family at bedside. Pt family member denies any needs at this time. Reports upper endoscopy revealed an ulcer. PT family encouraged pt to notify nurse if they have any questions, problems or concerns for LVAD coordinator.  Pt family verbalized understanding and in agreement of plan. Will follow up with pt soon.

## 2019-07-18 NOTE — [employer]OVATION PATIENT INSTRUCTIONS
Discharge Summary/Instructions after an Endoscopic Procedure  Patient Name: Tim Richards  Patient MRN: [id number]  Patient YOB: 1965 Thursday, July 18, 2019  Blane Valdez MD  RESTRICTIONS:  During your procedure today, you received medications for sedation.  These   medications may affect your judgment, balance and coordination.  Therefore,   for 24 hours, you have the following restrictions:   - DO NOT drive a car, operate machinery, make legal/financial decisions,   sign important papers or drink alcohol.    ACTIVITY:  Today: no heavy lifting, straining or running due to procedural   sedation/anesthesia.  The following day: return to full activity including work.  DIET:  Eat and drink normally unless instructed otherwise.     TREATMENT FOR COMMON SIDE EFFECTS:  - Mild abdominal pain, nausea, belching, bloating or excessive gas:  rest,   eat lightly and use a heating pad.  - Sore Throat: treat with throat lozenges and/or gargle with warm salt   water.  - Because air was used during the procedure, expelling large amounts of air   from your rectum or belching is normal.  - If a bowel prep was taken, you may not have a bowel movement for 1-3 days.    This is normal.  SYMPTOMS TO WATCH FOR AND REPORT TO YOUR PHYSICIAN:  1. Abdominal pain or bloating, other than gas cramps.  2. Chest pain.  3. Back pain.  4. Signs of infection such as: chills or fever occurring within 24 hours   after the procedure.  5. Rectal bleeding, which would show as bright red, maroon, or black stools.   (A tablespoon of blood from the rectum is not serious, especially if   hemorrhoids are present.)  6. Vomiting.  7. Weakness or dizziness.  GO DIRECTLY TO THE NEAREST EMERGENCY ROOM IF YOU HAVE ANY OF THE FOLLOWING:      Difficulty breathing              Chills and/or fever over 101 F   Persistent vomiting and/or vomiting blood   Severe abdominal pain   Severe chest pain   Black, tarry stools   Bleeding- more than one tablespoon   Any  other symptom or condition that you feel may need urgent attention  Your doctor recommends these additional instructions:  If any biopsies were taken, your doctors clinic will contact you in 1 to 2   weeks with any results.  - Return patient to ICU for ongoing care.   - Repeat colonoscopy in 3 years for surveillance.   -Supportive care for now. Avoid hypotension  - Consider mesenteric doppler to rule out embolic event (impaired renal   function)  - Consider adding mesalamine product (Asacol 800mg) for ulcer healing  For questions, problems or results please call your physician - Blane Valdez MD at Work:  [phone].  [facility], EMERGENCY ROOM PHONE NUMBER: [phone]  IF A COMPLICATION OR EMERGENCY SITUATION ARISES AND YOU ARE UNABLE TO REACH   YOUR PHYSICIAN - GO DIRECTLY TO THE EMERGENCY ROOM.  Blane Valdez MD  7/18/2019 2:29:32 PM  This report has been verified and signed electronically.  [employer]

## 2019-07-18 NOTE — TREATMENT PLAN
GI Treatment Plan    Tim Richards is a 53 y.o. male admitted to hospital 7/16/2019 (Hospital Day: 3) due to GIB (gastrointestinal bleeding).     Interval History    EGD 7/18:  Impression:    - Normal esophagus.                        - Normal stomach.                        - Normal examined duodenum.                        - The examined portion of the jejunum was normal.                        - No specimens collected.  Recommendation:        Colonoscopy 7/18  Impression:    - Preparation of the colon was poor.                        - Blood in the entire examined colon.                        - A single (solitary) ulcer in the transverse colon.                        - A single (solitary) ulcer in the cecum.                        - The examination was otherwise normal on direct                         and retroflexion views.                        - No specimens collected.  Recommendation:                          - Repeat colonoscopy in 3 years for surveillance.                        -Supportive care for now. Avoid hypotension                        - Consider mesenteric doppler to rule out embolic                         event (impaired renal function)                        - Consider adding mesalamine product (Asacol 800mg)                         for ulcer healing  -trend hb      - Plan of care was discussed with primary team.  - We are following. Call if rebleed    Thank you for involving us in the care of Tim Richards. Please call with any additional questions, concerns or changes in the patient's clinical status.    Kirit Saldaña MD  Gastroenterology Fellow  Spectralink: [id number]

## 2019-07-18 NOTE — CONSULTS
Consult Note    Inpatient consult to Hematology  Consult performed by: Kristin Bowers MD  Consult ordered by: Maira Crum NP        SUBJECTIVE:     History of Present Illness:  Tim Richards is a 53-year-old male with dilated CM, CHF stage D s/p LVAD (March 2018) on Coumadin, presenting with bloody bowel movements since yesterday evening. Hematology consulted for thrombocytopenia.    Patient presented on 7/16/19 with bright red blood per rectum, and abdominal cramping. Hb 7.9 on admission, was 14.5 when last checked 2 weeks ago. INR 2.7. The patient is on Warfarin at home. Patient to have EGD and colonoscopy today. Plt count was 202 on admission, today it is 97.    Patient seen today, with wife at bedside, last episode of BRBPR was 1 hour ago. Denies any cramping. No fevers/chills/night sweats, no weight loss. No previous hematologic issues. No family history of malignancy of hematologic issues. Denies any other bleeding or bruising. No hematuria.    Review of patient's allergies indicates:   Allergen Reactions    Lisinopril Anaphylaxis     Past Medical History:   Diagnosis Date    CHF (congestive heart failure)     Dilated cardiomyopathy 1/10/2018    Disorder of kidney and ureter     CKD    Gout     HTN (hypertension)     Ventricular tachycardia (paroxysmal)      Past Surgical History:   Procedure Laterality Date    CARDIAC CATHETERIZATION  Dec. 2012    CARDIAC DEFIBRILLATOR PLACEMENT Left     CRRT-D    CLOSURE-STERNAL WOUND N/A 3/10/2018    Performed by Yg [facility]aufman MD at [facility] OR 2ND FLR    COLONOSCOPY N/A 7/17/2019    Performed by Blane Valdez MD at [facility] ENDO (2ND FLR)    COLONOSCOPY N/A 3/6/2018    Performed by Alonso Bone MD at [facility] ENDO (2ND FLR)    EGD (ESOPHAGOGASTRODUODENOSCOPY) N/A 7/17/2019    Performed by Blane Valdez MD at [facility] ENDO (2ND FLR)    INSERTION-ICD-BIVENTRICULAR N/A 10/31/2014    Per[facility] at [facility] CATH LAB    Insertion-Left Ventricular Assist Device  N/A 3/8/2018    [facility] at [facility] OR 2ND FLR    PLACEMENT-NEOPERICARDIUM  3/10/2018    Performed by [facility] MD at [facility] OR 2ND FLR    REVISION  3/9/2018    Performed by [facility] at [facility] OR 2ND FLR    WASHOUT- MEDIASTINUM  3/9/2018    Performed by [facility] MD at [facility] OR 2ND FLR     Family History   Problem Relation Age of Onset    Hypertension Father     Diabetes Father     Coronary artery disease Father     Heart disease Father         CHF    No Known Problems Mother     Cancer Sister 54        breast CA    No Known Problems Brother      Social History     Tobacco Use    Smoking status: Former Smoker     Packs/day: 1.00     Years: 31.00     Pack years: 31.00     Types: Cigarettes     Last attempt to quit: 2018     Years since quittin.5    Smokeless tobacco: Never Used    Tobacco comment: pt is quiting on his own - pt stated not qualified for program;  pt  quit on his own   Substance Use Topics    Alcohol use: No     Alcohol/week: 0.0 oz     Comment: one fifth of gin or rum/week    Drug use: No     Review of Systems   Constitutional: Negative for chills and fever.   HENT: Negative for nosebleeds and sore throat.    Eyes: Negative for blurred vision and double vision.   Respiratory: Negative for cough, hemoptysis and shortness of breath.    Cardiovascular: Negative for chest pain and leg swelling.   Gastrointestinal: Positive for blood in stool. Negative for abdominal pain, melena, nausea and vomiting.   Genitourinary: Negative for hematuria.   Musculoskeletal: Negative for joint pain and myalgias.   Skin: Negative for rash.   Neurological: Negative for dizziness, sensory change and headaches.   Endo/Heme/Allergies: Does not bruise/bleed easily.     OBJECTIVE:     Vital Signs:  Temp:  [98.2 °F (36.8 °C)-98.5 °F (36.9 °C)]   Pulse:  [65-75]   Resp:  [11-37]   BP: ()/(0-72)   SpO2:  [98 %-100 %]     Physical Exam   Constitutional: He appears  well-developed and well-nourished.   HENT:   Head: Normocephalic and atraumatic.   Eyes: Pupils are equal, round, and reactive to light. EOM are normal. No scleral icterus.   Neck: Neck supple.   Cardiovascular: Normal rate and regular rhythm.   Pulmonary/Chest: Effort normal and breath sounds normal. No respiratory distress.   Healed sternotomy scar   Abdominal: Soft. He exhibits no distension. There is no tenderness.   Musculoskeletal: Normal range of motion. He exhibits no edema.   Lymphadenopathy:     He has no cervical adenopathy.   Neurological: He is alert.   Skin: Skin is warm and dry.   Psychiatric: He has a normal mood and affect. His behavior is normal.     Laboratory:  CBC:   Recent Labs   Lab 07/18/19  0400   WBC 11.47   RBC 3.02*   HGB 8.8*   HCT 27.7*   PLT 97*   MCV 92   MCH 29.1   MCHC 31.8*     CMP:   Recent Labs   Lab 07/17/19  0414 07/18/19  0400   * 84   CALCIUM 8.2* 8.2*   ALBUMIN 2.9*  --    PROT 5.3*  --     135*   K 5.1 4.4   CO2 24 24    104   BUN 31* 25*   CREATININE 2.3* 1.7*   ALKPHOS 52*  --    ALT 12  --    AST 24  --    BILITOT 1.6*  --      Coagulation:   Recent Labs   Lab 07/18/19  0400   LABPROT 19.9*   INR 2.1*   APTT 32.7*       Diagnostic Results:  CLINICAL HISTORY:  Abd pain, gastroenteritis or colitis suspected;LVAD, BRBPR;    TECHNIQUE:  The patient was surveyed from the lung bases through the pelvis without the administration of contrast.  The data was reconstructed for coronal, sagittal, and axial images.    COMPARISON:  CT 01/13/2018; ultrasound 03/14/2018; radiograph 07/16/2019.    FINDINGS:  The lung bases demonstrate pulmonary micronodules within the right middle and right lower lobes, unchanged when compared to CT 01/13/2018.  No new nodules identified.  No pleural effusion is seen.    The heart is enlarged.  Cardiac pacing wires are partially visualized terminating within the heart.  There is a small pericardial effusion.  Left ventricular assist  device is in place.  No evidence of fluid collection or inflammation along the driveline.    The liver is normal in size and attenuation.  No focal hepatic abnormality is seen. The gallbladder contains high density material likely representing sludge in the absence of recent intravenous contrast administration.  No intrahepatic or extrahepatic biliary ductal dilatation is identified. The spleen is unremarkable.  A small splenule is present.    The stomach and pancreas are within normal limits.  Nonspecific nodular thickening of the left adrenal gland.  Right adrenal gland is within normal limits..    The kidneys are normal in size and location. No hydronephrosis is seen.  The proximal ureters appear normal in course and caliber.  No radiodense calculus seen within the kidneys or proximal ureters on either side.    The visualized loops of small and large bowel show no evidence of obstruction or inflammation.  The appendix is partially visualized and appears unremarkable.    No ascites, free fluid, or intraperitoneal free air is identified.  Few normal sized mesenteric and retroperitoneal lymph nodes without evidence of pathologic jose enlargement.  The abdominal aorta is normal in course and caliber without significant atherosclerotic calcifications.    The osseous structures demonstrate degenerative changes.  No acute fracture or osseous destructive process.  Transitional lumbosacral vertebral body incidentally noted.    There is a tiny fat containing umbilical hernia.      Impression       1. No CT findings to suggest gastroenteritis or colitis as clinically questioned.  2. Cardiomegaly.  Cardiac pacing wires and left ventricular assist device in place without evidence of complication.  Small pericardial effusion.  3. High-density material within the gallbladder likely representing sludge in the absence of recent IV contrast administration.  4. Additional findings as above.       ASSESSMENT/PLAN:   1)  Thrombocytopenia, likely consumptive coagulopathy related to acute GIB  -Plt count was normal on admission 7/16/19 at 202  -Today (7/18/19) plt count of 97  -Low probability HIT, has not received any heparin products since 2019 according to MAR  -LDH was 256  -Have added on other studies to evaluate additional causes: haptoglobin, retic, B12, folate, ferritin, HCV, HBV, HIV, TSH    2) Anemia likely d/t blood loss from GIB  -To have EGD/colonoscopy  -Hb today 8.4    3) CHF patient with LVAD  -On Coumadin at home    The following was staffed and discussed with supervising physician Dr. Bermudez. Please contact Hematology Fellow with any additional questions or concerns.    Kristin Bowers MD  Hematology/Oncology fellow

## 2019-07-18 NOTE — PLAN OF CARE
Problem: Adult Inpatient Plan of Care  Goal: Plan of Care Review   See vital signs and assessments in flowsheets. See below for updates on today's progress.     Pulmonary: 2L NC    Cardiovascular: NS paced 70s. Intermittent pulsatility with Heartmate 2. No alarms overnight. Numbers WNL. Dopplers 80s. Afebrile.    Neurological: aao x 4    Gastrointestinal: 1 bloody bm overnight. Bowel prep administered as ordered. NPO since midnight except bowel prep per md.    Genitourinary: void per urinal.    Endocrine: Labs per order.    Integumentary/Other: Skin intact. LVAD dressing site intact.    Infusions: 3 units prbc overnight.    Patient progressing towards goals as tolerated, plan of care communicated and reviewed with Tim Richards and family. All concerns addressed. Will continue to monitor.

## 2019-07-18 NOTE — [employer] PATIENT INSTRUCTIONS
Discharge Summary/Instructions after an Endoscopic Procedure  Patient Name: Tim Richards  Patient MRN: [id number]  Patient YOB: 1965 Thursday, July 18, 2019  Blane Valdez MD  RESTRICTIONS:  During your procedure today, you received medications for sedation.  These   medications may affect your judgment, balance and coordination.  Therefore,   for 24 hours, you have the following restrictions:   - DO NOT drive a car, operate machinery, make legal/financial decisions,   sign important papers or drink alcohol.    ACTIVITY:  Today: no heavy lifting, straining or running due to procedural   sedation/anesthesia.  The following day: return to full activity including work.  DIET:  Eat and drink normally unless instructed otherwise.     TREATMENT FOR COMMON SIDE EFFECTS:  - Mild abdominal pain, nausea, belching, bloating or excessive gas:  rest,   eat lightly and use a heating pad.  - Sore Throat: treat with throat lozenges and/or gargle with warm salt   water.  - Because air was used during the procedure, expelling large amounts of air   from your rectum or belching is normal.  - If a bowel prep was taken, you may not have a bowel movement for 1-3 days.    This is normal.  SYMPTOMS TO WATCH FOR AND REPORT TO YOUR PHYSICIAN:  1. Abdominal pain or bloating, other than gas cramps.  2. Chest pain.  3. Back pain.  4. Signs of infection such as: chills or fever occurring within 24 hours   after the procedure.  5. Rectal bleeding, which would show as bright red, maroon, or black stools.   (A tablespoon of blood from the rectum is not serious, especially if   hemorrhoids are present.)  6. Vomiting.  7. Weakness or dizziness.  GO DIRECTLY TO THE NEAREST EMERGENCY ROOM IF YOU HAVE ANY OF THE FOLLOWING:      Difficulty breathing              Chills and/or fever over 101 F   Persistent vomiting and/or vomiting blood   Severe abdominal pain   Severe chest pain   Black, tarry stools   Bleeding- more than one tablespoon   Any  other symptom or condition that you feel may need urgent attention  Your doctor recommends these additional instructions:  If any biopsies were taken, your doctors clinic will contact you in 1 to 2   weeks with any results.  - Return patient to ICU for ongoing care.   - Perform a colonoscopy today.   - The findings and recommendations were discussed with the designated   responsible adult.  For questions, problems or results please call your physician - Blane Valdez MD at Work:  [phone].  [facility], EMERGENCY ROOM PHONE NUMBER: [phone]  IF A COMPLICATION OR EMERGENCY SITUATION ARISES AND YOU ARE UNABLE TO REACH   YOUR PHYSICIAN - GO DIRECTLY TO THE EMERGENCY ROOM.  Blane Valdez MD  7/18/2019 2:20:50 PM  This report has been verified and signed electronically.  [employer]

## 2019-07-18 NOTE — ASSESSMENT & PLAN NOTE
-Heartmate 2, implanted 3/8/18 as DT (Dr. Kaufman)  -INR therapeutic today, 2.1 with goal 2-3. Hold coumadin due to GIB  -Hold ASA for now  -MAP goal 60-80,  will hold anti-HTNs for now given active GIB (spironolactone, doxazosin, amlodipine, hydralazine)  -Appears euvolemic, monitor with blood transfusions. On Bumex 4 at home  -TTE 7/17 at 9800 rpm: LVEDD 7.23, LVEF 13%, diastolic dysfunction, RV mildly dilated, normal RV function, mild MR, trace TR, IVC 3, small pericardial effusion, AV does not open, septum bows right    Procedure: Device Interrogation Including analysis of device parameters  Current Settings: Ventricular Assist Device  Review of device function is stable/unstable stable    TXP LVAD INTERROGATIONS 7/18/2019 7/18/2019 7/18/2019 7/18/2019 7/18/2019 7/18/2019 7/18/2019   Type HeartMate II HeartMate II HeartMate II HeartMate II HeartMate II HeartMate II HeartMate II   Flow 5.7 5.9 5.6 5.9 5.5 5.6 5.8   Speed 9800 9800 9800 9800 9800 9800 9800   PI 3.3 3.6 3.6 3.3 3.2 3.1 3.1   Power (Jackson) 6.6 6.7 6.5 6.8 6.4 6.5 6.6   LSL 9400 9400 9400 9400 9400 9400 9400   Pulsatility Intermittent pulse Intermittent pulse Intermittent pulse Intermittent pulse Intermittent pulse Intermittent pulse Intermittent pulse

## 2019-07-18 NOTE — INTERVAL H&P NOTE
The patient has been examined and the H&P has been reviewed:    I concur with the findings and no changes have occurred since H&P was written.     History and Exam unchanged from visit. Delayed procedure yesterday due to elevated wbc and r/o C diff (now negative)    Procedure - EGD/Colon  Neck - supple  Plan of anesthesia - General  ASA - per anesthesia  Mallampati - per anesthesia  Anesthesia problems - no  Family history of anesthesia problems - no      Anesthesia/Surgery risks, benefits and alternative options discussed and understood by patient/family.          Active Hospital Problems    Diagnosis  POA    *GIB (gastrointestinal bleeding) [K92.2]  Unknown    Thrombocytopenia, unspecified [D69.6]  Unknown    Smoker [F17.200]  Unknown    Leukocytosis [D72.829]  No    Bloody diarrhea [R19.7]  Yes    SOB (shortness of breath) [R06.02]  Yes    Hypertension [I10]  Yes    Anemia [D64.9]  Yes    LVAD (left ventricular assist device) present [Z95.811]  Not Applicable    CKD (chronic kidney disease), stage III [N18.3]  Yes    Dilated cardiomyopathy [I42.0]  Yes    PVT (paroxysmal ventricular tachycardia) [I47.2]  Yes      Resolved Hospital Problems   No resolved problems to display.

## 2019-07-18 NOTE — ANESTHESIA POSTPROCEDURE EVALUATION
Anesthesia Post Evaluation    Patient: Tim Richards    Procedure(s) Performed: Procedure(s) (LRB):  EGD (ESOPHAGOGASTRODUODENOSCOPY) (N/A)  COLONOSCOPY (N/A)    Final Anesthesia Type: general  Patient location during evaluation: ICU  Patient participation: Yes- Able to Participate  Level of consciousness: awake and alert  Post-procedure vital signs: reviewed and stable  Pain management: adequate  Airway patency: patent  PONV status at discharge: No PONV  Anesthetic complications: no      Cardiovascular status: blood pressure returned to baseline  Respiratory status: unassisted  Hydration status: euvolemic  Follow-up not needed.          Vitals Value Taken Time   BP 88/57 7/18/2019  2:23 PM   Temp 36.8 °C (98.2 °F) 7/18/2019  7:40 AM   Pulse 63 7/18/2019  3:42 PM   Resp 124 7/18/2019  3:42 PM   SpO2 93 % 7/18/2019  1:28 PM   Vitals shown include unvalidated device data.      No case tracking events are documented in the log.      Pain/Iker Score: No data recorded

## 2019-07-18 NOTE — ASSESSMENT & PLAN NOTE
-Acute GIB with hematochezia  -Hgb 7.9 on admit, down from 14.5 ~ 2 weeks ago  -Transfused 10 units PRBCs total since admission without appropriate response in H&H. Hgb 8.8 today from 7.9  -Hold home coumadin and ASA for now; will not reverse at this time. INR 2.1 today  -GI consulted - C diff -ve, so plan for EGD/C-scope today  -CT abdomen 7/16/19- no colitis  -Protonix 80 mg IV x 1 on admit then Protonix 40 IV BID  -Hold antiHTNs and Bumex for now in setting of bleed

## 2019-07-18 NOTE — SUBJECTIVE & OBJECTIVE
**Interval History: Completing prep, and stools are still bloody. Got 3 more units PC's overnight 2/2 Hgb still 7.8 along with weakness and fatigue (has had a total of 10 units since admit). Hgb this morning is 8.8. WCC has normalized, blood and stool cxs with NGTD. Platelets remain low at 92K (count was 202 on admit), peripheral smear has not been done, so will consult Hematology. Feeling much better since last night after transfusions    Continuous Infusions:  Scheduled Meds:   allopurinol  100 mg Oral Daily    amiodarone  200 mg Oral Daily    ferrous gluconate  324 mg Oral Daily with breakfast    magnesium oxide  400 mg Oral TID    pantoprazole  40 mg Intravenous BID     PRN Meds:sodium chloride, sodium chloride, sodium chloride, sodium chloride, sodium chloride, promethazine (PHENERGAN) IVPB    Review of patient's allergies indicates:   Allergen Reactions    Lisinopril Anaphylaxis     Objective:     Vital Signs (Most Recent):  Temp: 98.2 °F (36.8 °C) (07/18/19 0740)  Pulse: 65 (07/18/19 0900)  Resp: 11 (07/18/19 0900)  BP: (!) 91/52 (07/18/19 0800)  SpO2: 100 % (07/18/19 0857) Vital Signs (24h Range):  Temp:  [98.1 °F (36.7 °C)-98.6 °F (37 °C)] 98.2 °F (36.8 °C)  Pulse:  [65-85] 65  Resp:  [11-47] 11  SpO2:  [98 %-100 %] 100 %  BP: ()/(0-73) 91/52     Patient Vitals for the past 72 hrs (Last 3 readings):   Weight   07/18/19 0333 119.9 kg (264 lb 5.3 oz)   07/17/19 1415 117.9 kg (260 lb)   07/16/19 1915 117.9 kg (260 lb)     Body mass index is 35.85 kg/m².      Intake/Output Summary (Last 24 hours) at 7/18/2019 1022  Last data filed at 7/18/2019 0800  Gross per 24 hour   Intake 5118.75 ml   Output 1200 ml   Net 3918.75 ml       Hemodynamic Parameters:       Telemetry: BiV paced with PVC's    Physical Exam   Constitutional: He is oriented to person, place, and time. He appears well-developed and well-nourished.   HENT:   Head: Normocephalic and atraumatic.   Eyes: Pupils are equal, round, and reactive  to light. Conjunctivae and EOM are normal.   Neck: Normal range of motion. Neck supple. No JVD present. No thyromegaly present.   Cardiovascular: Normal rate and regular rhythm.   Smooth VAD hum, intermittently pulsatile   Pulmonary/Chest: Effort normal and breath sounds normal.   Abdominal: Soft. Bowel sounds are normal.   Musculoskeletal: Normal range of motion. He exhibits no edema.   Neurological: He is alert and oriented to person, place, and time.   Skin: Skin is warm and dry. Capillary refill takes 2 to 3 seconds.   Psychiatric: He has a normal mood and affect. His behavior is normal. Judgment and thought content normal.       Significant Labs:  CBC:  Recent Labs   Lab 07/17/19  1949 07/17/19 2258 07/18/19  0400   WBC 14.83* 13.69* 11.47   RBC 2.69* 2.66* 3.02*   HGB 7.9* 7.8* 8.8*   HCT 24.6* 23.7* 27.7*   * 115* 97*   MCV 91 89 92   MCH 29.4 29.3 29.1   MCHC 32.1 32.9 31.8*     BNP:  Recent Labs   Lab 07/16/19  1305 07/17/19  0414   BNP 47 153*     CMP:  Recent Labs   Lab 07/16/19  1305 07/17/19  0414 07/18/19  0400   * 117* 84   CALCIUM 8.9 8.2* 8.2*   ALBUMIN 3.2* 2.9*  --    PROT 6.0 5.3*  --     137 135*   K 4.5 5.1 4.4   CO2 24 24 24    104 104   BUN 28* 31* 25*   CREATININE 2.0* 2.3* 1.7*   ALKPHOS 60 52*  --    ALT 15 12  --    AST 22 24  --    BILITOT 0.5 1.6*  --       Coagulation:   Recent Labs   Lab 07/16/19  1305 07/17/19  0414 07/18/19  0400   INR 2.7* 2.1* 2.1*   APTT  --  25.9 32.7*     LDH:  Recent Labs   Lab 07/16/19  1305 07/17/19  0414 07/18/19  0400    288* 256     Microbiology:  Microbiology Results (last 7 days)     Procedure Component Value Units Date/Time    Blood culture [[id number]] Collected:  07/17/19 1420    Order Status:  Completed Specimen:  Blood from Peripheral, Antecubital, Left Updated:  07/17/19 2145     Blood Culture, Routine No Growth to date    Blood culture [[id number]] Collected:  07/17/19 1452    Order Status:  Completed Specimen:   Blood from Peripheral, Hand, Right Updated:  07/17/19 2145     Blood Culture, Routine No Growth to date    Clostridium difficile EIA [[id number]] Collected:  07/17/19 0904    Order Status:  Completed Specimen:  Stool Updated:  07/17/19 1409     C. diff Antigen Negative     C difficile Toxins A+B, EIA Negative     Comment: Testing not recommended for children <24 months old.       E. coli 0157 antigen [[id number]] Collected:  07/16/19 1731    Order Status:  Completed Specimen:  Stool Updated:  07/17/19 1046     Shiga Toxin 1 E.coli Negative     Shiga Toxin 2 E.coli Negative    Stool culture [[id number]] Collected:  07/16/19 1731    Order Status:  Sent Specimen:  Stool Updated:  07/16/19 4451          I have reviewed all pertinent labs within the past 24 hours.    Estimated Creatinine Clearance: 67.9 mL/min (A) (based on SCr of 1.7 mg/dL (H)).    Diagnostic Results:  I have reviewed all pertinent imaging results/findings within the past 24 hours.

## 2019-07-18 NOTE — PROGRESS NOTES
[facility]  Heart Transplant  Progress Note    Patient Name: Tim Richards  MRN: [id number]  Admission Date: 7/16/2019  Hospital Length of Stay: 2 days  Attending Physician: Antonio Hadley Jr.,*  Primary Care Provider: Ja Méndez MD  Principal Problem:GIB (gastrointestinal bleeding)    Subjective:     **Interval History: Completing prep, and stools are still bloody. Got 3 more units PC's overnight 2/2 Hgb still 7.8 along with weakness and fatigue (has had a total of 10 units since admit). Hgb this morning is 8.8. WCC has normalized, blood and stool cxs with NGTD. Platelets remain low at 92K (count was 202 on admit), peripheral smear has not been done, so will consult Hematology. Feeling much better since last night after transfusions    Continuous Infusions:  Scheduled Meds:   allopurinol  100 mg Oral Daily    amiodarone  200 mg Oral Daily    ferrous gluconate  324 mg Oral Daily with breakfast    magnesium oxide  400 mg Oral TID    pantoprazole  40 mg Intravenous BID     PRN Meds:sodium chloride, sodium chloride, sodium chloride, sodium chloride, sodium chloride, promethazine (PHENERGAN) IVPB    Review of patient's allergies indicates:   Allergen Reactions    Lisinopril Anaphylaxis     Objective:     Vital Signs (Most Recent):  Temp: 98.2 °F (36.8 °C) (07/18/19 0740)  Pulse: 65 (07/18/19 0900)  Resp: 11 (07/18/19 0900)  BP: (!) 91/52 (07/18/19 0800)  SpO2: 100 % (07/18/19 0857) Vital Signs (24h Range):  Temp:  [98.1 °F (36.7 °C)-98.6 °F (37 °C)] 98.2 °F (36.8 °C)  Pulse:  [65-85] 65  Resp:  [11-47] 11  SpO2:  [98 %-100 %] 100 %  BP: ()/(0-73) 91/52     Patient Vitals for the past 72 hrs (Last 3 readings):   Weight   07/18/19 0333 119.9 kg (264 lb 5.3 oz)   07/17/19 1415 117.9 kg (260 lb)   07/16/19 1915 117.9 kg (260 lb)     Body mass index is 35.85 kg/m².      Intake/Output Summary (Last 24 hours) at 7/18/2019 1022  Last data filed at 7/18/2019 0800  Gross per 24  hour   Intake 5118.75 ml   Output 1200 ml   Net 3918.75 ml       Hemodynamic Parameters:       Telemetry: BiV paced with PVC's    Physical Exam   Constitutional: He is oriented to person, place, and time. He appears well-developed and well-nourished.   HENT:   Head: Normocephalic and atraumatic.   Eyes: Pupils are equal, round, and reactive to light. Conjunctivae and EOM are normal.   Neck: Normal range of motion. Neck supple. No JVD present. No thyromegaly present.   Cardiovascular: Normal rate and regular rhythm.   Smooth VAD hum, intermittently pulsatile   Pulmonary/Chest: Effort normal and breath sounds normal.   Abdominal: Soft. Bowel sounds are normal.   Musculoskeletal: Normal range of motion. He exhibits no edema.   Neurological: He is alert and oriented to person, place, and time.   Skin: Skin is warm and dry. Capillary refill takes 2 to 3 seconds.   Psychiatric: He has a normal mood and affect. His behavior is normal. Judgment and thought content normal.       Significant Labs:  CBC:  Recent Labs   Lab 07/17/19  1949 07/17/19  2258 07/18/19  0400   WBC 14.83* 13.69* 11.47   RBC 2.69* 2.66* 3.02*   HGB 7.9* 7.8* 8.8*   HCT 24.6* 23.7* 27.7*   * 115* 97*   MCV 91 89 92   MCH 29.4 29.3 29.1   MCHC 32.1 32.9 31.8*     BNP:  Recent Labs   Lab 07/16/19  1305 07/17/19  0414   BNP 47 153*     CMP:  Recent Labs   Lab 07/16/19  1305 07/17/19  0414 07/18/19  0400   * 117* 84   CALCIUM 8.9 8.2* 8.2*   ALBUMIN 3.2* 2.9*  --    PROT 6.0 5.3*  --     137 135*   K 4.5 5.1 4.4   CO2 24 24 24    104 104   BUN 28* 31* 25*   CREATININE 2.0* 2.3* 1.7*   ALKPHOS 60 52*  --    ALT 15 12  --    AST 22 24  --    BILITOT 0.5 1.6*  --       Coagulation:   Recent Labs   Lab 07/16/19  1305 07/17/19  0414 07/18/19  0400   INR 2.7* 2.1* 2.1*   APTT  --  25.9 32.7*     LDH:  Recent Labs   Lab 07/16/19  1305 07/17/19  0414 07/18/19  0400    288* 256     Microbiology:  Microbiology Results (last 7 days)      Procedure Component Value Units Date/Time    Blood culture [[id number]] Collected:  07/17/19 1420    Order Status:  Completed Specimen:  Blood from Peripheral, Antecubital, Left Updated:  07/17/19 2145     Blood Culture, Routine No Growth to date    Blood culture [[id number]] Collected:  07/17/19 1452    Order Status:  Completed Specimen:  Blood from Peripheral, Hand, Right Updated:  07/17/19 2145     Blood Culture, Routine No Growth to date    Clostridium difficile EIA [[id number]] Collected:  07/17/19 0904    Order Status:  Completed Specimen:  Stool Updated:  07/17/19 1409     C. diff Antigen Negative     C difficile Toxins A+B, EIA Negative     Comment: Testing not recommended for children <24 months old.       E. coli 0157 antigen [[id number]] Collected:  07/16/19 1731    Order Status:  Completed Specimen:  Stool Updated:  07/17/19 1046     Shiga Toxin 1 E.coli Negative     Shiga Toxin 2 E.coli Negative    Stool culture [[id number]] Collected:  07/16/19 1731    Order Status:  Sent Specimen:  Stool Updated:  07/16/19 1751          I have reviewed all pertinent labs within the past 24 hours.    Estimated Creatinine Clearance: 67.9 mL/min (A) (based on SCr of 1.7 mg/dL (H)).    Diagnostic Results:  I have reviewed all pertinent imaging results/findings within the past 24 hours.    Assessment and Plan:     53 M with hx of DCM, s/p HM2 3/8/18 presents to ER with BRBPR and LH/dizziness. Pt says he was feeling fine until yesterday afternoon after lunch. He says his stomach started grumbling a lot and then he started feeling LH/dizzy with standing. He started having bloody diarrhea around 6 pm last night. Last episode 11:30 this am. Having numbness in lips and hands at times. C/o LLQ abd pain. No N/V.     In ER, Hgb was found to be 7.9, down from 14.5 2 weeks ago      * GIB (gastrointestinal bleeding)  -Acute GIB with hematochezia  -Hgb 7.9 on admit, down from 14.5 ~ 2 weeks ago  -Transfused 10 units PRBCs total since  admission without appropriate response in H&H. Hgb 8.8 today from 7.9  -Hold home coumadin and ASA for now; will not reverse at this time. INR 2.1 today  -GI consulted - C diff -ve, so plan for EGD/C-scope today  -CT abdomen 7/16/19- no colitis  -Protonix 80 mg IV x 1 on admit then Protonix 40 IV BID  -Hold antiHTNs and Bumex for now in setting of bleed        Smoker  - Continues to smoke occasionally    Thrombocytopenia, unspecified  - Platelet count on admit WNL, and has trended down to 80's - 90's. Will consult Hematology    Leukocytosis  -C diff -ve  -Stool culture in progress  -Blood cultures with NGTD  - WCC has now normalized. Leukocytosis likely 2/2 active GI bleed    Anemia  -Acute anemia, due to GIB.  -Hgb 7.9 on admit, down from 14.5 ~ 2 weeks ago  -See GIB    LVAD (left ventricular assist device) present  -Heartmate 2, implanted 3/8/18 as DT (Dr. Kaufman)  -INR therapeutic today, 2.1 with goal 2-3. Hold coumadin due to GIB  -Hold ASA for now  -MAP goal 60-80,  will hold anti-HTNs for now given active GIB (spironolactone, doxazosin, amlodipine, hydralazine)  -Appears euvolemic, monitor with blood transfusions. On Bumex 4 at home  -TTE 7/17 at 9800 rpm: LVEDD 7.23, LVEF 13%, diastolic dysfunction, RV mildly dilated, normal RV function, mild MR, trace TR, IVC 3, small pericardial effusion, AV does not open, septum bows right    Procedure: Device Interrogation Including analysis of device parameters  Current Settings: Ventricular Assist Device  Review of device function is stable/unstable stable    TXP LVAD INTERROGATIONS 7/18/2019 7/18/2019 7/18/2019 7/18/2019 7/18/2019 7/18/2019 7/18/2019   Type HeartMate II HeartMate II HeartMate II HeartMate II HeartMate II HeartMate II HeartMate II   Flow 5.7 5.9 5.6 5.9 5.5 5.6 5.8   Speed 9800 9800 9800 9800 9800 9800 9800   PI 3.3 3.6 3.6 3.3 3.2 3.1 3.1   Power (Jackson) 6.6 6.7 6.5 6.8 6.4 6.5 6.6   LSL 9400 9400 9400 9400 9400 9400 9400   Pulsatility Intermittent  pulse Intermittent pulse Intermittent pulse Intermittent pulse Intermittent pulse Intermittent pulse Intermittent pulse       CKD (chronic kidney disease), stage III  - Baseline creatinine ~ 1.7-2.1, 1.7 today    PVT (paroxysmal ventricular tachycardia)  -Cont Amio  -ICD in place      Uninterrupted Critical Care/Counseling Time (not including procedures): 30 minutes      Maira Crum NP [id number]  Heart Transplant  [facility]

## 2019-07-18 NOTE — PROGRESS NOTES
07/18/2019  Ayesha Haas    Current provider:  Antonio Hadley Jr.,*      I, Ayesha Haas, rounded on Tim Richards to ensure all mechanical assist device settings (IABP or VAD) were appropriate and all parameters were within limits.  I was able to ensure all back up equipment was present, the staff had no issues, and the Perfusion Department daily rounding was complete.    12:57 PM

## 2019-07-18 NOTE — ASSESSMENT & PLAN NOTE
-C diff -ve  -Stool culture in progress  -Blood cultures with NGTD  - WCC has now normalized. Leukocytosis likely 2/2 active GI bleed

## 2019-07-18 NOTE — NURSING
"Per Dr. Hahn, I re-educated patient on importance of completing the entire bowel prep and patient stated "I will finish it". Poured 500 cc into cups for patient.    WCTM.  "

## 2019-07-19 PROBLEM — K92.2 GI BLEED: Status: ACTIVE | Noted: 2019-07-19

## 2019-07-19 LAB
ALBUMIN SERPL BCP-MCNC: 2.6 G/DL (ref 3.5–5.2)
ALP SERPL-CCNC: 52 U/L (ref 55–135)
ALT SERPL W/O P-5'-P-CCNC: 10 U/L (ref 10–44)
ANION GAP SERPL CALC-SCNC: 6 MMOL/L (ref 8–16)
APTT BLDCRRT: 29.2 SEC (ref 21–32)
AST SERPL-CCNC: 24 U/L (ref 10–40)
BACTERIA STL CULT: NORMAL
BASOPHILS # BLD AUTO: 0.02 K/UL (ref 0–0.2)
BASOPHILS NFR BLD: 0.3 % (ref 0–1.9)
BILIRUB DIRECT SERPL-MCNC: 0.2 MG/DL (ref 0.1–0.3)
BILIRUB SERPL-MCNC: 0.4 MG/DL (ref 0.1–1)
BNP SERPL-MCNC: 1016 PG/ML (ref 0–99)
BUN SERPL-MCNC: 13 MG/DL (ref 6–20)
CALCIUM SERPL-MCNC: 8.3 MG/DL (ref 8.7–10.5)
CHLORIDE SERPL-SCNC: 103 MMOL/L (ref 95–110)
CO2 SERPL-SCNC: 25 MMOL/L (ref 23–29)
CREAT SERPL-MCNC: 1.5 MG/DL (ref 0.5–1.4)
CRP SERPL-MCNC: 9.4 MG/L (ref 0–8.2)
DIFFERENTIAL METHOD: ABNORMAL
EOSINOPHIL # BLD AUTO: 0 K/UL (ref 0–0.5)
EOSINOPHIL NFR BLD: 0 % (ref 0–8)
ERYTHROCYTE [DISTWIDTH] IN BLOOD BY AUTOMATED COUNT: 15.3 % (ref 11.5–14.5)
EST. GFR  (AFRICAN AMERICAN): >60 ML/MIN/1.73 M^2
EST. GFR  (NON AFRICAN AMERICAN): 52.8 ML/MIN/1.73 M^2
GLUCOSE SERPL-MCNC: 86 MG/DL (ref 70–110)
HBV CORE AB SERPL QL IA: NEGATIVE
HBV SURFACE AG SERPL QL IA: NEGATIVE
HCT VFR BLD AUTO: 28.3 % (ref 40–54)
HCV AB SERPL QL IA: NEGATIVE
HGB BLD-MCNC: 9.2 G/DL (ref 14–18)
IMM GRANULOCYTES # BLD AUTO: 0.05 K/UL (ref 0–0.04)
IMM GRANULOCYTES NFR BLD AUTO: 0.7 % (ref 0–0.5)
INR PPP: 1.7 (ref 0.8–1.2)
LDH SERPL L TO P-CCNC: 227 U/L (ref 110–260)
LYMPHOCYTES # BLD AUTO: 0.9 K/UL (ref 1–4.8)
LYMPHOCYTES NFR BLD: 12.6 % (ref 18–48)
MAGNESIUM SERPL-MCNC: 2 MG/DL (ref 1.6–2.6)
MCH RBC QN AUTO: 29.8 PG (ref 27–31)
MCHC RBC AUTO-ENTMCNC: 32.5 G/DL (ref 32–36)
MCV RBC AUTO: 92 FL (ref 82–98)
MONOCYTES # BLD AUTO: 0.9 K/UL (ref 0.3–1)
MONOCYTES NFR BLD: 13 % (ref 4–15)
NEUTROPHILS # BLD AUTO: 5.2 K/UL (ref 1.8–7.7)
NEUTROPHILS NFR BLD: 73.4 % (ref 38–73)
NRBC BLD-RTO: 0 /100 WBC
PHOSPHATE SERPL-MCNC: 3.1 MG/DL (ref 2.7–4.5)
PLATELET # BLD AUTO: 109 K/UL (ref 150–350)
PMV BLD AUTO: 10.1 FL (ref 9.2–12.9)
POTASSIUM SERPL-SCNC: 4.1 MMOL/L (ref 3.5–5.1)
PREALB SERPL-MCNC: 16 MG/DL (ref 20–43)
PROT SERPL-MCNC: 5 G/DL (ref 6–8.4)
PROTHROMBIN TIME: 16.5 SEC (ref 9–12.5)
RBC # BLD AUTO: 3.09 M/UL (ref 4.6–6.2)
SODIUM SERPL-SCNC: 134 MMOL/L (ref 136–145)
WBC # BLD AUTO: 7.05 K/UL (ref 3.9–12.7)

## 2019-07-19 PROCEDURE — 99233 PR SUBSEQUENT HOSPITAL CARE,LEVL III: ICD-10-PCS | Mod: ,,, | Performed by: INTERNAL MEDICINE

## 2019-07-19 PROCEDURE — 20600001 HC STEP DOWN PRIVATE ROOM

## 2019-07-19 PROCEDURE — 85730 THROMBOPLASTIN TIME PARTIAL: CPT

## 2019-07-19 PROCEDURE — 93750 PR INTERROGATE VENT ASSIST DEV, IN PERSON, W PHYSICIAN ANALYSIS: ICD-10-PCS | Mod: ,,, | Performed by: INTERNAL MEDICINE

## 2019-07-19 PROCEDURE — C9113 INJ PANTOPRAZOLE SODIUM, VIA: HCPCS | Performed by: PHYSICIAN ASSISTANT

## 2019-07-19 PROCEDURE — 80076 HEPATIC FUNCTION PANEL: CPT

## 2019-07-19 PROCEDURE — 63600175 PHARM REV CODE 636 W HCPCS: Performed by: PHYSICIAN ASSISTANT

## 2019-07-19 PROCEDURE — 83615 LACTATE (LD) (LDH) ENZYME: CPT

## 2019-07-19 PROCEDURE — 85025 COMPLETE CBC W/AUTO DIFF WBC: CPT

## 2019-07-19 PROCEDURE — 83735 ASSAY OF MAGNESIUM: CPT

## 2019-07-19 PROCEDURE — 86140 C-REACTIVE PROTEIN: CPT

## 2019-07-19 PROCEDURE — 80048 BASIC METABOLIC PNL TOTAL CA: CPT

## 2019-07-19 PROCEDURE — 25000003 PHARM REV CODE 250: Performed by: STUDENT IN AN ORGANIZED HEALTH CARE EDUCATION/TRAINING PROGRAM

## 2019-07-19 PROCEDURE — 25000003 PHARM REV CODE 250: Performed by: NURSE PRACTITIONER

## 2019-07-19 PROCEDURE — 84100 ASSAY OF PHOSPHORUS: CPT

## 2019-07-19 PROCEDURE — 93750 INTERROGATION VAD IN PERSON: CPT | Mod: ,,, | Performed by: INTERNAL MEDICINE

## 2019-07-19 PROCEDURE — 99233 SBSQ HOSP IP/OBS HIGH 50: CPT | Mod: ,,, | Performed by: INTERNAL MEDICINE

## 2019-07-19 PROCEDURE — 84134 ASSAY OF PREALBUMIN: CPT

## 2019-07-19 PROCEDURE — 94761 N-INVAS EAR/PLS OXIMETRY MLT: CPT

## 2019-07-19 PROCEDURE — 85610 PROTHROMBIN TIME: CPT

## 2019-07-19 PROCEDURE — 27000248 HC VAD-ADDITIONAL DAY

## 2019-07-19 PROCEDURE — 25000003 PHARM REV CODE 250: Performed by: INTERNAL MEDICINE

## 2019-07-19 PROCEDURE — 83880 ASSAY OF NATRIURETIC PEPTIDE: CPT

## 2019-07-19 RX ORDER — FERROUS GLUCONATE 324(37.5)
324 TABLET ORAL
Status: CANCELLED | OUTPATIENT
Start: 2019-07-19

## 2019-07-19 RX ORDER — WARFARIN SODIUM 5 MG/1
5 TABLET ORAL DAILY
Status: CANCELLED | OUTPATIENT
Start: 2019-07-19

## 2019-07-19 RX ORDER — LANOLIN ALCOHOL/MO/W.PET/CERES
400 CREAM (GRAM) TOPICAL 3 TIMES DAILY
Status: CANCELLED | OUTPATIENT
Start: 2019-07-19

## 2019-07-19 RX ORDER — BUMETANIDE 1 MG/1
2 TABLET ORAL DAILY
Status: DISCONTINUED | OUTPATIENT
Start: 2019-07-19 | End: 2019-07-23 | Stop reason: HOSPADM

## 2019-07-19 RX ORDER — AMIODARONE HYDROCHLORIDE 200 MG/1
200 TABLET ORAL DAILY
Status: CANCELLED | OUTPATIENT
Start: 2019-07-19

## 2019-07-19 RX ORDER — POTASSIUM CHLORIDE 20 MEQ/1
20 TABLET, EXTENDED RELEASE ORAL DAILY
Status: DISCONTINUED | OUTPATIENT
Start: 2019-07-19 | End: 2019-07-23 | Stop reason: HOSPADM

## 2019-07-19 RX ORDER — WARFARIN 7.5 MG/1
7.5 TABLET ORAL
Status: DISCONTINUED | OUTPATIENT
Start: 2019-07-20 | End: 2019-07-21

## 2019-07-19 RX ORDER — WARFARIN SODIUM 5 MG/1
5 TABLET ORAL
Status: DISCONTINUED | OUTPATIENT
Start: 2019-07-19 | End: 2019-07-21

## 2019-07-19 RX ORDER — ALLOPURINOL 100 MG/1
100 TABLET ORAL DAILY
Status: CANCELLED | OUTPATIENT
Start: 2019-07-19

## 2019-07-19 RX ADMIN — MESALAMINE 1000 MG: 250 CAPSULE ORAL at 09:07

## 2019-07-19 RX ADMIN — PANTOPRAZOLE SODIUM 40 MG: 40 INJECTION, POWDER, FOR SOLUTION INTRAVENOUS at 09:07

## 2019-07-19 RX ADMIN — MESALAMINE 1000 MG: 250 CAPSULE ORAL at 02:07

## 2019-07-19 RX ADMIN — MESALAMINE 1000 MG: 250 CAPSULE ORAL at 07:07

## 2019-07-19 RX ADMIN — POTASSIUM CHLORIDE 20 MEQ: 1500 TABLET, EXTENDED RELEASE ORAL at 08:07

## 2019-07-19 RX ADMIN — WARFARIN SODIUM 5 MG: 5 TABLET ORAL at 04:07

## 2019-07-19 RX ADMIN — BUMETANIDE 2 MG: 1 TABLET ORAL at 08:07

## 2019-07-19 RX ADMIN — PANTOPRAZOLE SODIUM 40 MG: 40 INJECTION, POWDER, FOR SOLUTION INTRAVENOUS at 08:07

## 2019-07-19 RX ADMIN — AMIODARONE HYDROCHLORIDE 200 MG: 200 TABLET ORAL at 08:07

## 2019-07-19 RX ADMIN — MAGNESIUM OXIDE TAB 400 MG (241.3 MG ELEMENTAL MG) 400 MG: 400 (241.3 MG) TAB at 08:07

## 2019-07-19 RX ADMIN — MAGNESIUM OXIDE TAB 400 MG (241.3 MG ELEMENTAL MG) 400 MG: 400 (241.3 MG) TAB at 02:07

## 2019-07-19 RX ADMIN — MESALAMINE 1000 MG: 250 CAPSULE ORAL at 08:07

## 2019-07-19 RX ADMIN — FERROUS GLUCONATE TAB 324 MG (37.5 MG ELEMENTAL IRON) 324 MG: 324 (37.5 FE) TAB at 07:07

## 2019-07-19 RX ADMIN — ALLOPURINOL 100 MG: 100 TABLET ORAL at 08:07

## 2019-07-19 RX ADMIN — MAGNESIUM OXIDE TAB 400 MG (241.3 MG ELEMENTAL MG) 400 MG: 400 (241.3 MG) TAB at 09:07

## 2019-07-19 NOTE — PLAN OF CARE
Problem: Adult Inpatient Plan of Care  Goal: Plan of Care Review  Outcome: Ongoing (interventions implemented as appropriate)  Patient free of falls/traumas/injuries. Patient diuresing with Bumex PO daily. K+ 4.1; Mg 2.1 today. H/H 9.2/28.3. LVAD dressing change due 7/20/2019. INR 1.7. Skin clean, dry, and intact. Patient educated on plan of care and verbalized understanding. Patients VS stable and no distress. Will continue to monitor.

## 2019-07-19 NOTE — PROGRESS NOTES
07/19/2019  Ayesha Haas    Current provider:  Amy Rhodes MD      I, Ayesha Haas, rounded on Tim Richards to ensure all mechanical assist device settings (IABP or VAD) were appropriate and all parameters were within limits.  I was able to ensure all back up equipment was present, the staff had no issues, and the Perfusion Department daily rounding was complete.    12:21 PM

## 2019-07-19 NOTE — NURSING TRANSFER
Nursing Transfer Note      7/19/2019     Transfer To:  A    Transfer via wheelchair    Transfer with cardiac monitoring    Transported by RN, PCT    Medicines sent: yes    Chart send with patient: Yes    Notified: spouse    Patient reassessed at: 7/19 1130     Upon arrival to floor: cardiac monitor applied, patient oriented to room, call bell in reach and bed in lowest position

## 2019-07-19 NOTE — NURSING TRANSFER
Nursing Transfer Note      7/19/2019     Transfer To: 303    Transfer via wheelchair    Transfer with cardiac monitoring    Transported by nurse    Medicines sent: no    Chart send with patient: Yes    Notified: nurse    Patient reassessed at:7/19/19 @ 1130    Upon arrival to floor: cardiac monitor applied, patient oriented to room, call bell in reach and bed in lowest position

## 2019-07-19 NOTE — PLAN OF CARE
Hematology Brief Follow-Up Note    ASSESSMENT/PLAN:   1) Thrombocytopenia, likely consumptive coagulopathy related to acute GIB  -Plt count was normal on admission 7/16/19 at 202  -Today (7/19/19) plt count of 109  -Low probability HIT, has not received any heparin products since 2019 according to MAR  -LDH was 256, haptoglobin <10, retic 5.1  -Folate/B12 wnl  -Hepatitis studies pending, HIV negative  -TSH low, T4 wnl  -CT abdomen with unremarkable spleen  -Transfuse for plt<10 or active bleeding     2) Anemia likely d/t blood loss from GIB  -S/p EGD/colonoscopy 7/18/19 with colon and cecum ulcers. Will need repeat colonoscopy in 3 years.  -Hb today 9.2     3) CHF patient with LVAD  -On Coumadin at home     To be discussed with Dr Nielsen. We will sign off. Please contact Hematology Fellow with any additional questions or concerns, or if worsening of counts.     Kristin Bowers MD  Hematology/Oncology fellow

## 2019-07-19 NOTE — ASSESSMENT & PLAN NOTE
-C diff -ve  -Stool culture in progress  -Blood cultures with NGTD  - WCC has now normalized. Leukocytosis likely 2/2 active GI bleed (see thrombocytopenia)

## 2019-07-19 NOTE — PROGRESS NOTES
[facility]  Heart Transplant  Progress Note    Patient Name: Tim Richards  MRN: [id number]  Admission Date: 7/16/2019  Hospital Length of Stay: 3 days  Attending Physician: Antonio Hadley Jr.,*  Primary Care Provider: Ja Méndez MD  Principal Problem:GIB (gastrointestinal bleeding)    Subjective:     **Interval History: No bowel movements since EGD/C-scope yesterday, and Hgb is stable at 9.2. Feels well. BNP has climbed to 1016 from 153, and weight is up 4#, so will resume home dose of Bumex 2 mg qd    Continuous Infusions:  Scheduled Meds:   allopurinol  100 mg Oral Daily    amiodarone  200 mg Oral Daily    bumetanide  2 mg Oral Daily    ferrous gluconate  324 mg Oral Daily with breakfast    magnesium oxide  400 mg Oral TID    mesalamine  1,000 mg Oral QID    pantoprazole  40 mg Intravenous BID    potassium chloride SA  20 mEq Oral Daily    warfarin  2 mg Oral Daily     PRN Meds:sodium chloride, sodium chloride, sodium chloride, sodium chloride, sodium chloride, promethazine (PHENERGAN) IVPB    Review of patient's allergies indicates:   Allergen Reactions    Lisinopril Anaphylaxis     Objective:     Vital Signs (Most Recent):  Temp: 98.2 °F (36.8 °C) (07/19/19 0705)  Pulse: 65 (07/19/19 1000)  Resp: (!) 25 (07/19/19 1000)  BP: 93/69 (07/19/19 1000)  SpO2: 99 % (07/19/19 0800) Vital Signs (24h Range):  Temp:  [98.2 °F (36.8 °C)-98.5 °F (36.9 °C)] 98.2 °F (36.8 °C)  Pulse:  [62-80] 65  Resp:  [] 25  SpO2:  [98 %-100 %] 99 %  BP: ()/(0-80) 93/69     Patient Vitals for the past 72 hrs (Last 3 readings):   Weight   07/18/19 0333 119.9 kg (264 lb 5.3 oz)   07/17/19 1415 117.9 kg (260 lb)   07/16/19 1915 117.9 kg (260 lb)     Body mass index is 35.85 kg/m².      Intake/Output Summary (Last 24 hours) at 7/19/2019 1045  Last data filed at 7/19/2019 1000  Gross per 24 hour   Intake 1220 ml   Output 2125 ml   Net -905 ml       Hemodynamic Parameters:       Telemetry:  BiV paced with PVC's    Physical Exam   Constitutional: He is oriented to person, place, and time. He appears well-developed and well-nourished.   HENT:   Head: Normocephalic and atraumatic.   Eyes: Pupils are equal, round, and reactive to light. Conjunctivae and EOM are normal.   Neck: Normal range of motion. Neck supple. No JVD present. No thyromegaly present.   Cardiovascular: Normal rate and regular rhythm.   Smooth VAD hum, intermittently pulsatile   Pulmonary/Chest: Effort normal and breath sounds normal.   Abdominal: Soft. Bowel sounds are normal.   Musculoskeletal: Normal range of motion. He exhibits no edema.   Neurological: He is alert and oriented to person, place, and time.   Skin: Skin is warm and dry. Capillary refill takes 2 to 3 seconds.   Psychiatric: He has a normal mood and affect. His behavior is normal. Judgment and thought content normal.       Significant Labs:  CBC:  Recent Labs   Lab 07/18/19  0400 07/18/19  1456 07/19/19  0400   WBC 11.47 9.21 7.05   RBC 3.02* 3.32* 3.09*   HGB 8.8* 9.8* 9.2*   HCT 27.7* 29.6* 28.3*   PLT 97* 104* 109*   MCV 92 89 92   MCH 29.1 29.5 29.8   MCHC 31.8* 33.1 32.5     BNP:  Recent Labs   Lab 07/16/19  1305 07/17/19  0414 07/19/19  0400   BNP 47 153* 1,016*     CMP:  Recent Labs   Lab 07/16/19  1305 07/17/19  0414 07/18/19  0400 07/19/19  0400   * 117* 84 86   CALCIUM 8.9 8.2* 8.2* 8.3*   ALBUMIN 3.2* 2.9*  --  2.6*   PROT 6.0 5.3*  --  5.0*    137 135* 134*   K 4.5 5.1 4.4 4.1   CO2 24 24 24 25    104 104 103   BUN 28* 31* 25* 13   CREATININE 2.0* 2.3* 1.7* 1.5*   ALKPHOS 60 52*  --  52*   ALT 15 12  --  10   AST 22 24  --  24   BILITOT 0.5 1.6*  --  0.4      Coagulation:   Recent Labs   Lab 07/17/19  0414 07/18/19  0400 07/19/19  0400   INR 2.1* 2.1* 1.7*   APTT 25.9 32.7* 29.2     LDH:  Recent Labs   Lab 07/16/19  1305 07/17/19  0414 07/18/19  0400 07/19/19  0400    288* 256 227     Microbiology:  Microbiology Results (last 7 days)      Procedure Component Value Units Date/Time    Stool culture [[id number]] Collected:  07/16/19 1731    Order Status:  Completed Specimen:  Stool Updated:  07/19/19 1038     Stool Culture Nothing significant to date    Blood culture [[id number]] Collected:  07/17/19 1420    Order Status:  Completed Specimen:  Blood from Peripheral, Antecubital, Left Updated:  07/18/19 1612     Blood Culture, Routine No Growth to date      No Growth to date    Blood culture [[id number]] Collected:  07/17/19 1452    Order Status:  Completed Specimen:  Blood from Peripheral, Hand, Right Updated:  07/18/19 1612     Blood Culture, Routine No Growth to date      No Growth to date    Clostridium difficile EIA [[id number]] Collected:  07/17/19 0904    Order Status:  Completed Specimen:  Stool Updated:  07/17/19 1409     C. diff Antigen Negative     C difficile Toxins A+B, EIA Negative     Comment: Testing not recommended for children <24 months old.       E. coli 0157 antigen [[id number]] Collected:  07/16/19 1731    Order Status:  Completed Specimen:  Stool Updated:  07/17/19 1046     Shiga Toxin 1 E.coli Negative     Shiga Toxin 2 E.coli Negative          I have reviewed all pertinent labs within the past 24 hours.    Estimated Creatinine Clearance: 77 mL/min (A) (based on SCr of 1.5 mg/dL (H)).    Diagnostic Results:  I have reviewed all pertinent imaging results/findings within the past 24 hours.    Assessment and Plan:     53 M with hx of DCM, s/p HM2 3/8/18 presents to ER with BRBPR and LH/dizziness. Pt says he was feeling fine until yesterday afternoon after lunch. He says his stomach started grumbling a lot and then he started feeling LH/dizzy with standing. He started having bloody diarrhea around 6 pm last night. Last episode 11:30 this am. Having numbness in lips and hands at times. C/o LLQ abd pain. No N/V.     In ER, Hgb was found to be 7.9, down from 14.5 2 weeks ago      * GIB (gastrointestinal bleeding)  -Acute GIB with  hematochezia  -Hgb 7.9 on admit, down from 14.5 ~ 2 weeks ago  -Transfused 10 units PRBCs total since admission without appropriate response in H&H. Hgb 9.2  -EGD yesterday unremarkable. C-scope showed non bleeding ulcer transverse colon and cecal ulcer with large clot on it  -Mesalamine started yesterday at GI's request  -GI OK'd to restart coumadin. It was resumed last night at lower dose of 2 mg qd. INR has dropped to 1.7 - will not bridge given recent massive GI bleed, but will boost Coumadin  -Mesenteric US attempted yesterday to R/O ischemia as cause for ulcers, but it was technically impossible d/t body habitus and presence of LVAD  -CT abdomen 7/16/19- no colitis  -C diff -ve  -Protonix 80 mg IV x 1 on admit then Protonix 40 IV BID  -Hold antiHTNs in the setting of recent massive GI bleed  -Transfer to step down        Smoker  - Continues to smoke occasionally    Thrombocytopenia, unspecified  - Platelet count on admit WNL, and was trending down, but is coming back up to 109K today  - Appreciate Hematology's help - anemia and thrombocytopenia due to GI bleed from colonic ulcer confirmed on endoscopy    Leukocytosis  -C diff -ve  -Stool culture in progress  -Blood cultures with NGTD  - WCC has now normalized. Leukocytosis likely 2/2 active GI bleed (see thrombocytopenia)    Anemia  -Acute anemia, due to GIB.  -Hgb 7.9 on admit, down from 14.5 ~ 2 weeks ago  -See GIB    LVAD (left ventricular assist device) present  -Heartmate 2, implanted 3/8/18 as DT (Dr. Kaufman)  -INR sub therapeutic today, 1.7 with goal 2-3. Coumadin resumed at lower dose of 2 mg after scopes yesterday, will boost  -Hold ASA for now  -MAP goal 60-80,  will hold anti-HTNs for now given recent massive GIB (spironolactone, doxazosin, amlodipine, hydralazine)  -Resume Bumex at 2 mg qd  -TTE 7/17 at 9800 rpm: LVEDD 7.23, LVEF 13%, diastolic dysfunction, RV mildly dilated, normal RV function, mild MR, trace TR, IVC 3, small pericardial effusion,  AV does not open, septum bows right    Procedure: Device Interrogation Including analysis of device parameters  Current Settings: Ventricular Assist Device  Review of device function is stable/unstable stable    TXP LVAD INTERROGATIONS 7/19/2019 7/19/2019 7/19/2019 7/19/2019 7/19/2019 7/19/2019 7/19/2019   Type HeartMate II HeartMate II HeartMate II HeartMate II HeartMate II HeartMate II HeartMate II   Flow 6.0 6.1 6.3 5.8 5.7 6.3 6.1   Speed 9800 9800 9800 9800 9800 9800 9800   PI 3.0 2.5 2.9 3.7 3.3 2.8 3.1   Power (Jackson) 6.8 6.8 7.1 6.7 6.6 6.9 6.8   LSL - - - - 9400 9400 9400   Pulsatility Intermittent pulse Intermittent pulse Intermittent pulse Intermittent pulse Intermittent pulse Intermittent pulse Intermittent pulse       CKD (chronic kidney disease), stage III  - Baseline creatinine ~ 1.7-2.1, 1.5 today    PVT (paroxysmal ventricular tachycardia)  -Cont Amio  -ICD in place        Maira Crum NP [id number]  Heart Transplant  [facility]

## 2019-07-19 NOTE — PHYSICIAN QUERY
"PT Name: Tim Richards  MR #: [id number]    Physician Query Form - Heart  Condition Clarification     CDS/: Ariana [facility] RN, CCDS             Contact information: [email or web address]  This form is a permanent document in the medical record.     Query Date: July 19, 2019    By submitting this query, we are merely seeking further clarification of documentation. Please utilize your independent clinical judgment when addressing the question(s) below.    The medical record contains the following   Indicators     Supporting Clinical Findings Location in Medical Record   X BNP 47-->153-->1,016 7/16, 7/17, 7/19 lab   X EF 13% 7/17 tte    Radiology findings     X Echo Results · LVAD present. Base speed is 9800. The pump type is a Heartmate II.  · Severe left ventricular enlargement.  · Severely decreased left ventricular systolic function. The estimated ejection fraction is 13%  · Eccentric left ventricular hypertrophy.  · The interventricular septum appears to bow into the right ventricle. The aortic valve does not open.  · Left ventricular diastolic dysfunction.  · Mild right ventricular enlargement.  · Normal right ventricular systolic function.  · Mild mitral regurgitation.  · Normal central venous pressure (3 mm Hg).  · Small pericardial effusion 7/17 tte    "Ascites" documented      "SOB" or "COLEMAN" documented      "Hypoxia" documented     X Heart Failure documented DCM, s/p HM2 3/8/18   CHF    CHF pt with LVAD 7/16 h/p      7/18 hem/onc note    "Edema" documented     X Diuretics/Meds Furosemide 40 mg IV x1    Bumetanide 2 mg  Po daily   7/17 mar    Mar-Start: 07/19/19 0645   X Treatment: Appears euvolemic, monitor with blood transfusions. Lasix 40 IV x 1 given overnight between transfusions.  7/17 prog note    Other:      Heart failure (HF) can be acute, chronic or both. It is generally further specificed as systolic, diastolic, or combined. Lastly, it is important to identify an underlying etiology if known " or suspected.     Common clues to acute exacerbation:  Rapidly progressive symptoms (w/in 2 weeks of presentation), using IV diuretics to treat, using supplemental O2, pulmonary edema on Xray, MI w/in 4 weeks, and/or BNP >500    Systolic Heart Failure: is defined as chart documentation of a left ventricular ejection fraction (LVEF) less than 40%     Diastolic Heart Failure: is defined as a left ventricular ejection fraction (LVEF) greater than 40%   +      Evidence of diastolic dysfunction on echocardiography OR    Right heart catheterization wedge pressure above 12 mm Hg OR    Left heart catheterization left ventricular end diastolic pressure 18 mm Hg or above.    References: *American Heart Association    The clinical guidelines noted below are only system guidelines, and do not replace the providers clinical judgment.     Provider, please specify the type and acuity of CHF.                  [  x ] Chronic Combined Systolic and Diastolic Heart Failure    [   ] Other Type of Heart Failure (please specify type): _________________________    [   ] Other (please specify): ___________________________________  [   ] Clinically Undetermined                          Please document in your progress notes daily for the duration of treatment until resolved and include in your discharge summary.

## 2019-07-19 NOTE — PHYSICIAN QUERY
"PT Name: Tim Richards  MR #: [id number]    Physician Query Form - Hematology Clarification      CDS/: Ariana Jefferson RN, CCDS             Contact information: [email or web address]    This form is a permanent document in the medical record.      Query Date: July 19, 2019    By submitting this query, we are merely seeking further clarification of documentation. Please utilize your independent clinical judgment when addressing the question(s) below.    The Medical record contains the following:   Indicators  Supporting Clinical Findings Location in Medical Record   x "Anemia" documented Acute anemia due to GIB 7/16 h/p, 7/17-7/19 prog notes   x H & H = 7.9/25.0-->7.8/23.7-->8.8/27.7 7/16- 7/18 lab    BP =                     HR=     x "GI bleeding" documented presents to the ER for an emergent evaluation due to bloody diarrhea    Acute GIB with hematochezia x 1 day  7/16 ed note      7/16 h/p    Acute bleeding (Non GI site)     x Transfusion(s) Got 3 more units PC's overnight 2/2 Hgb still 7.8 along with weakness and fatigue (has had a total of 10 units since admit 7/18 prog note    Treatment:     x Other:  Notified that patient was pale and diaphoretic on the toilet, having a large, bloody BM. 7/16 significant event     Provider, please specify type of anemia.    [x] Acute blood loss anemia       [  ] Other Hematological Diagnosis (please specify):     [  ] Clinically Undetermined       Please document in your progress notes daily for the duration of treatment, until resolved, and include in your discharge summary.                                                                                                      "

## 2019-07-19 NOTE — ASSESSMENT & PLAN NOTE
-Heartmate 2, implanted 3/8/18 as DT (Dr. Kaufman)  -INR sub therapeutic today, 1.7 with goal 2-3. Coumadin resumed at lower dose of 2 mg after scopes yesterday, will boost  -Hold ASA for now  -MAP goal 60-80,  will hold anti-HTNs for now given recent massive GIB (spironolactone, doxazosin, amlodipine, hydralazine)  -Resume Bumex at 2 mg qd  -TTE 7/17 at 9800 rpm: LVEDD 7.23, LVEF 13%, diastolic dysfunction, RV mildly dilated, normal RV function, mild MR, trace TR, IVC 3, small pericardial effusion, AV does not open, septum bows right    Procedure: Device Interrogation Including analysis of device parameters  Current Settings: Ventricular Assist Device  Review of device function is stable/unstable stable    TXP LVAD INTERROGATIONS 7/19/2019 7/19/2019 7/19/2019 7/19/2019 7/19/2019 7/19/2019 7/19/2019   Type HeartMate II HeartMate II HeartMate II HeartMate II HeartMate II HeartMate II HeartMate II   Flow 6.0 6.1 6.3 5.8 5.7 6.3 6.1   Speed 9800 9800 9800 9800 9800 9800 9800   PI 3.0 2.5 2.9 3.7 3.3 2.8 3.1   Power (Jackson) 6.8 6.8 7.1 6.7 6.6 6.9 6.8   LSL - - - - 9400 9400 9400   Pulsatility Intermittent pulse Intermittent pulse Intermittent pulse Intermittent pulse Intermittent pulse Intermittent pulse Intermittent pulse

## 2019-07-19 NOTE — SUBJECTIVE & OBJECTIVE
**Interval History: No bowel movements since EGD/C-scope yesterday, and Hgb is stable at 9.2. Feels well. BNP has climbed to 1016 from 153, and weight is up 4#, so will resume home dose of Bumex 2 mg qd    Continuous Infusions:  Scheduled Meds:   allopurinol  100 mg Oral Daily    amiodarone  200 mg Oral Daily    bumetanide  2 mg Oral Daily    ferrous gluconate  324 mg Oral Daily with breakfast    magnesium oxide  400 mg Oral TID    mesalamine  1,000 mg Oral QID    pantoprazole  40 mg Intravenous BID    potassium chloride SA  20 mEq Oral Daily    warfarin  2 mg Oral Daily     PRN Meds:sodium chloride, sodium chloride, sodium chloride, sodium chloride, sodium chloride, promethazine (PHENERGAN) IVPB    Review of patient's allergies indicates:   Allergen Reactions    Lisinopril Anaphylaxis     Objective:     Vital Signs (Most Recent):  Temp: 98.2 °F (36.8 °C) (07/19/19 0705)  Pulse: 65 (07/19/19 1000)  Resp: (!) 25 (07/19/19 1000)  BP: 93/69 (07/19/19 1000)  SpO2: 99 % (07/19/19 0800) Vital Signs (24h Range):  Temp:  [98.2 °F (36.8 °C)-98.5 °F (36.9 °C)] 98.2 °F (36.8 °C)  Pulse:  [62-80] 65  Resp:  [] 25  SpO2:  [98 %-100 %] 99 %  BP: ()/(0-80) 93/69     Patient Vitals for the past 72 hrs (Last 3 readings):   Weight   07/18/19 0333 119.9 kg (264 lb 5.3 oz)   07/17/19 1415 117.9 kg (260 lb)   07/16/19 1915 117.9 kg (260 lb)     Body mass index is 35.85 kg/m².      Intake/Output Summary (Last 24 hours) at 7/19/2019 1045  Last data filed at 7/19/2019 1000  Gross per 24 hour   Intake 1220 ml   Output 2125 ml   Net -905 ml       Hemodynamic Parameters:       Telemetry: BiV paced with PVC's    Physical Exam   Constitutional: He is oriented to person, place, and time. He appears well-developed and well-nourished.   HENT:   Head: Normocephalic and atraumatic.   Eyes: Pupils are equal, round, and reactive to light. Conjunctivae and EOM are normal.   Neck: Normal range of motion. Neck supple. No JVD  present. No thyromegaly present.   Cardiovascular: Normal rate and regular rhythm.   Smooth VAD hum, intermittently pulsatile   Pulmonary/Chest: Effort normal and breath sounds normal.   Abdominal: Soft. Bowel sounds are normal.   Musculoskeletal: Normal range of motion. He exhibits no edema.   Neurological: He is alert and oriented to person, place, and time.   Skin: Skin is warm and dry. Capillary refill takes 2 to 3 seconds.   Psychiatric: He has a normal mood and affect. His behavior is normal. Judgment and thought content normal.       Significant Labs:  CBC:  Recent Labs   Lab 07/18/19  0400 07/18/19  1456 07/19/19  0400   WBC 11.47 9.21 7.05   RBC 3.02* 3.32* 3.09*   HGB 8.8* 9.8* 9.2*   HCT 27.7* 29.6* 28.3*   PLT 97* 104* 109*   MCV 92 89 92   MCH 29.1 29.5 29.8   MCHC 31.8* 33.1 32.5     BNP:  Recent Labs   Lab 07/16/19  1305 07/17/19  0414 07/19/19  0400   BNP 47 153* 1,016*     CMP:  Recent Labs   Lab 07/16/19  1305 07/17/19  0414 07/18/19  0400 07/19/19  0400   * 117* 84 86   CALCIUM 8.9 8.2* 8.2* 8.3*   ALBUMIN 3.2* 2.9*  --  2.6*   PROT 6.0 5.3*  --  5.0*    137 135* 134*   K 4.5 5.1 4.4 4.1   CO2 24 24 24 25    104 104 103   BUN 28* 31* 25* 13   CREATININE 2.0* 2.3* 1.7* 1.5*   ALKPHOS 60 52*  --  52*   ALT 15 12  --  10   AST 22 24  --  24   BILITOT 0.5 1.6*  --  0.4      Coagulation:   Recent Labs   Lab 07/17/19  0414 07/18/19  0400 07/19/19  0400   INR 2.1* 2.1* 1.7*   APTT 25.9 32.7* 29.2     LDH:  Recent Labs   Lab 07/16/19  1305 07/17/19  0414 07/18/19  0400 07/19/19  0400    288* 256 227     Microbiology:  Microbiology Results (last 7 days)     Procedure Component Value Units Date/Time    Stool culture [[id number]] Collected:  07/16/19 1731    Order Status:  Completed Specimen:  Stool Updated:  07/19/19 1038     Stool Culture Nothing significant to date    Blood culture [[id number]] Collected:  07/17/19 1420    Order Status:  Completed Specimen:  Blood from Peripheral,  Antecubital, Left Updated:  07/18/19 1612     Blood Culture, Routine No Growth to date      No Growth to date    Blood culture [[id number]] Collected:  07/17/19 1452    Order Status:  Completed Specimen:  Blood from Peripheral, Hand, Right Updated:  07/18/19 1612     Blood Culture, Routine No Growth to date      No Growth to date    Clostridium difficile EIA [[id number]] Collected:  07/17/19 0904    Order Status:  Completed Specimen:  Stool Updated:  07/17/19 1409     C. diff Antigen Negative     C difficile Toxins A+B, EIA Negative     Comment: Testing not recommended for children <24 months old.       E. coli 0157 antigen [[id number]] Collected:  07/16/19 1731    Order Status:  Completed Specimen:  Stool Updated:  07/17/19 1046     Shiga Toxin 1 E.coli Negative     Shiga Toxin 2 E.coli Negative          I have reviewed all pertinent labs within the past 24 hours.    Estimated Creatinine Clearance: 77 mL/min (A) (based on SCr of 1.5 mg/dL (H)).    Diagnostic Results:  I have reviewed all pertinent imaging results/findings within the past 24 hours.

## 2019-07-19 NOTE — ASSESSMENT & PLAN NOTE
-Acute GIB with hematochezia  -Hgb 7.9 on admit, down from 14.5 ~ 2 weeks ago  -Transfused 10 units PRBCs total since admission without appropriate response in H&H. Hgb 9.2  -EGD yesterday unremarkable. C-scope showed non bleeding ulcer transverse colon and cecal ulcer with large clot on it  -Mesalamine started yesterday at GI's request  -GI OK'd to restart coumadin. It was resumed last night at lower dose of 2 mg qd. INR has dropped to 1.7 - will not bridge given recent massive GI bleed, but will boost Coumadin  -Mesenteric US attempted yesterday to R/O ischemia as cause for ulcers, but it was technically impossible d/t body habitus and presence of LVAD  -CT abdomen 7/16/19- no colitis  -C diff -ve  -Protonix 80 mg IV x 1 on admit then Protonix 40 IV BID  -Hold antiHTNs in the setting of recent massive GI bleed  -Transfer to step down

## 2019-07-19 NOTE — PROGRESS NOTES
DISCHARGE    SW to pt's room today for possible weekend d/c.  Pt presents as sitting up in bed, aao x4, calm, cooperative, and asking and answering questions appropriately.  Pt verbalizes understanding and agreement with plan for possible d/c home this weekend.  Pt reports family will transport him home on day of d/c.  Pt denies any d/c needs, and none identified by medical team. SW notified LVAD coords. in multidisciplinary rounds this week of pt's report to SW during admit assessment to travel to [place] soon to visit his new grandchild.  LVAD coords. will f/u with pt re: travel plans.  Pt reports coping adequately at this time, and denies any needs or concerns to SW.  SW providing ongoing psychosocial and counseling support, education, resources, assistance, and discharge planning as indicated.  SW remains available.

## 2019-07-19 NOTE — PLAN OF CARE
Problem: Adult Inpatient Plan of Care  Goal: Optimal Comfort and Wellbeing  Outcome: Ongoing (interventions implemented as appropriate)  Problem: Adult Inpatient Plan of Care  Goal: Plan of Care Review   See vital signs and assessments in flowsheets. See below for updates on today's progress.      Pulmonary: % on RA     Cardiovascular: NS paced 70s. Intermittent pulsatility with Heartmate 2. No alarms overnight. Numbers WNL. Dopplers 70s-80s. Afebrile.     Neurological: aao x 4     Gastrointestinal: No BM overnight.     Genitourinary: void per urinal.     Endocrine: Labs per order.     Integumentary/Other: Skin intact. LVAD dressing site intact.     Infusions: none       Patient progressing towards goals as tolerated, plan of care communicated and reviewed with Tim Richards and family. All concerns addressed. Will continue to monitor.

## 2019-07-19 NOTE — ASSESSMENT & PLAN NOTE
- Platelet count on admit WNL, and was trending down, but is coming back up to 109K today  - Appreciate Hematology's help - anemia and thrombocytopenia due to GI bleed from colonic ulcer confirmed on endoscopy

## 2019-07-20 LAB
ANION GAP SERPL CALC-SCNC: 7 MMOL/L (ref 8–16)
APTT BLDCRRT: 25.7 SEC (ref 21–32)
BASOPHILS # BLD AUTO: 0.02 K/UL (ref 0–0.2)
BASOPHILS NFR BLD: 0.3 % (ref 0–1.9)
BUN SERPL-MCNC: 12 MG/DL (ref 6–20)
CALCIUM SERPL-MCNC: 8.5 MG/DL (ref 8.7–10.5)
CHLORIDE SERPL-SCNC: 102 MMOL/L (ref 95–110)
CO2 SERPL-SCNC: 27 MMOL/L (ref 23–29)
CREAT SERPL-MCNC: 1.6 MG/DL (ref 0.5–1.4)
DIFFERENTIAL METHOD: ABNORMAL
EOSINOPHIL # BLD AUTO: 0 K/UL (ref 0–0.5)
EOSINOPHIL NFR BLD: 0 % (ref 0–8)
ERYTHROCYTE [DISTWIDTH] IN BLOOD BY AUTOMATED COUNT: 15.9 % (ref 11.5–14.5)
EST. GFR  (AFRICAN AMERICAN): 56.4 ML/MIN/1.73 M^2
EST. GFR  (NON AFRICAN AMERICAN): 48.8 ML/MIN/1.73 M^2
GLUCOSE SERPL-MCNC: 106 MG/DL (ref 70–110)
HCT VFR BLD AUTO: 30 % (ref 40–54)
HGB BLD-MCNC: 9.7 G/DL (ref 14–18)
IMM GRANULOCYTES # BLD AUTO: 0.03 K/UL (ref 0–0.04)
IMM GRANULOCYTES NFR BLD AUTO: 0.4 % (ref 0–0.5)
INR PPP: 1.2 (ref 0.8–1.2)
LDH SERPL L TO P-CCNC: 238 U/L (ref 110–260)
LYMPHOCYTES # BLD AUTO: 1.2 K/UL (ref 1–4.8)
LYMPHOCYTES NFR BLD: 17.6 % (ref 18–48)
MAGNESIUM SERPL-MCNC: 2.1 MG/DL (ref 1.6–2.6)
MCH RBC QN AUTO: 29.5 PG (ref 27–31)
MCHC RBC AUTO-ENTMCNC: 32.3 G/DL (ref 32–36)
MCV RBC AUTO: 91 FL (ref 82–98)
MONOCYTES # BLD AUTO: 1.1 K/UL (ref 0.3–1)
MONOCYTES NFR BLD: 15.8 % (ref 4–15)
NEUTROPHILS # BLD AUTO: 4.6 K/UL (ref 1.8–7.7)
NEUTROPHILS NFR BLD: 65.9 % (ref 38–73)
NRBC BLD-RTO: 0 /100 WBC
PHOSPHATE SERPL-MCNC: 3.3 MG/DL (ref 2.7–4.5)
PLATELET # BLD AUTO: 133 K/UL (ref 150–350)
PMV BLD AUTO: 10.7 FL (ref 9.2–12.9)
POTASSIUM SERPL-SCNC: 3.9 MMOL/L (ref 3.5–5.1)
PROTHROMBIN TIME: 11.9 SEC (ref 9–12.5)
RBC # BLD AUTO: 3.29 M/UL (ref 4.6–6.2)
SODIUM SERPL-SCNC: 136 MMOL/L (ref 136–145)
WBC # BLD AUTO: 6.92 K/UL (ref 3.9–12.7)

## 2019-07-20 PROCEDURE — 93750 INTERROGATION VAD IN PERSON: CPT | Mod: ,,, | Performed by: INTERNAL MEDICINE

## 2019-07-20 PROCEDURE — 84100 ASSAY OF PHOSPHORUS: CPT

## 2019-07-20 PROCEDURE — 99232 PR SUBSEQUENT HOSPITAL CARE,LEVL II: ICD-10-PCS | Mod: 25,,, | Performed by: INTERNAL MEDICINE

## 2019-07-20 PROCEDURE — 83615 LACTATE (LD) (LDH) ENZYME: CPT

## 2019-07-20 PROCEDURE — C9113 INJ PANTOPRAZOLE SODIUM, VIA: HCPCS | Performed by: PHYSICIAN ASSISTANT

## 2019-07-20 PROCEDURE — 25000003 PHARM REV CODE 250: Performed by: STUDENT IN AN ORGANIZED HEALTH CARE EDUCATION/TRAINING PROGRAM

## 2019-07-20 PROCEDURE — 20600001 HC STEP DOWN PRIVATE ROOM

## 2019-07-20 PROCEDURE — 99232 SBSQ HOSP IP/OBS MODERATE 35: CPT | Mod: 25,,, | Performed by: INTERNAL MEDICINE

## 2019-07-20 PROCEDURE — 85730 THROMBOPLASTIN TIME PARTIAL: CPT

## 2019-07-20 PROCEDURE — 85025 COMPLETE CBC W/AUTO DIFF WBC: CPT

## 2019-07-20 PROCEDURE — 25000003 PHARM REV CODE 250: Performed by: INTERNAL MEDICINE

## 2019-07-20 PROCEDURE — 63600175 PHARM REV CODE 636 W HCPCS: Performed by: PHYSICIAN ASSISTANT

## 2019-07-20 PROCEDURE — 25000003 PHARM REV CODE 250: Performed by: NURSE PRACTITIONER

## 2019-07-20 PROCEDURE — 85610 PROTHROMBIN TIME: CPT

## 2019-07-20 PROCEDURE — 93750 PR INTERROGATE VENT ASSIST DEV, IN PERSON, W PHYSICIAN ANALYSIS: ICD-10-PCS | Mod: ,,, | Performed by: INTERNAL MEDICINE

## 2019-07-20 PROCEDURE — 80048 BASIC METABOLIC PNL TOTAL CA: CPT

## 2019-07-20 PROCEDURE — 83735 ASSAY OF MAGNESIUM: CPT

## 2019-07-20 PROCEDURE — 27000248 HC VAD-ADDITIONAL DAY

## 2019-07-20 PROCEDURE — 94761 N-INVAS EAR/PLS OXIMETRY MLT: CPT

## 2019-07-20 RX ADMIN — MESALAMINE 1000 MG: 250 CAPSULE ORAL at 08:07

## 2019-07-20 RX ADMIN — MAGNESIUM OXIDE TAB 400 MG (241.3 MG ELEMENTAL MG) 400 MG: 400 (241.3 MG) TAB at 08:07

## 2019-07-20 RX ADMIN — ALLOPURINOL 100 MG: 100 TABLET ORAL at 08:07

## 2019-07-20 RX ADMIN — AMIODARONE HYDROCHLORIDE 200 MG: 200 TABLET ORAL at 08:07

## 2019-07-20 RX ADMIN — BUMETANIDE 2 MG: 1 TABLET ORAL at 08:07

## 2019-07-20 RX ADMIN — PANTOPRAZOLE SODIUM 40 MG: 40 INJECTION, POWDER, FOR SOLUTION INTRAVENOUS at 08:07

## 2019-07-20 RX ADMIN — MESALAMINE 1000 MG: 250 CAPSULE ORAL at 04:07

## 2019-07-20 RX ADMIN — MAGNESIUM OXIDE TAB 400 MG (241.3 MG ELEMENTAL MG) 400 MG: 400 (241.3 MG) TAB at 04:07

## 2019-07-20 RX ADMIN — WARFARIN SODIUM 7.5 MG: 7.5 TABLET ORAL at 04:07

## 2019-07-20 RX ADMIN — POTASSIUM CHLORIDE 20 MEQ: 1500 TABLET, EXTENDED RELEASE ORAL at 08:07

## 2019-07-20 RX ADMIN — MESALAMINE 1000 MG: 250 CAPSULE ORAL at 12:07

## 2019-07-20 RX ADMIN — FERROUS GLUCONATE TAB 324 MG (37.5 MG ELEMENTAL IRON) 324 MG: 324 (37.5 FE) TAB at 08:07

## 2019-07-20 NOTE — PLAN OF CARE
Problem: Adult Inpatient Plan of Care  Goal: Plan of Care Review  Outcome: Ongoing (interventions implemented as appropriate)  Plan of care discussed with patient.  Patient ambulating independently, fall precautions in place. Patient has no complaints of pain. Discussed medications and care. INR resulted at 1.2 this am. Warfarin adjusted per pharmacy. Patient has no questions at this time. Will continue to monitor.

## 2019-07-20 NOTE — PROGRESS NOTES
07/20/2019  Fitz Christianson    Current provider:  Amy Rhodes MD      I, Fitz Christianson, rounded on Tim Richards to ensure all mechanical assist device settings (IABP or VAD) were appropriate and all parameters were within limits.  I was able to ensure all back up equipment was present, the staff had no issues, and the Perfusion Department daily rounding was complete.    11:28 AM

## 2019-07-20 NOTE — TREATMENT PLAN
GI Treatment Plan    Tim Richards is a 53 y.o. male admitted to hospital 7/16/2019 (Hospital Day: 5) due to GIB (gastrointestinal bleeding).     Interval History  -hb stable, no further bleeding since procedure    EGD 7/18:  Impression:    - Normal esophagus.                        - Normal stomach.                        - Normal examined duodenum.                        - The examined portion of the jejunum was normal.                        - No specimens collected.  Recommendation:        Colonoscopy 7/18  Impression:    - Preparation of the colon was poor.                        - Blood in the entire examined colon.                        - A single (solitary) ulcer in the transverse colon.                        - A single (solitary) ulcer in the cecum.                        - The examination was otherwise normal on direct                         and retroflexion views.                        - No specimens collected.  Recommendation:                          - Repeat colonoscopy in 3 years for surveillance.                        -Supportive care for now. Avoid hypotension                        - Consider mesenteric doppler to rule out embolic                         event (impaired renal function)                        - Consider adding mesalamine product (Asacol 800mg)                         for ulcer healing  -trend hb      -will sign off    Thank you for involving us in the care of Tim Richards. Please call with any additional questions, concerns or changes in the patient's clinical status.    Kirit Saldaña MD  Gastroenterology Fellow  Spectralink: [id number]

## 2019-07-20 NOTE — PLAN OF CARE
Problem: Adult Inpatient Plan of Care  Goal: Plan of Care Review  Outcome: Ongoing (interventions implemented as appropriate)  Pt free from fall, trauma, and injury throughout shift. No c/o chest pain, SOB, or discomfort. VSS. LVAD numbers and dopplers within defined limits. No alarms noted in hx. Pt has had no bloody bowel movements since procedure. Fall precautions reviewed and maintained. Pt verbalized understanding. All questions encouraged and answered. Will continue to monitor.

## 2019-07-20 NOTE — SUBJECTIVE & OBJECTIVE
Interval History: Pt seen and examined today at bedside. BM in am with no blood in stools. INR 1.2, c/w coumadin 5 mg MWFSun, 7.5 mg TTS. Mesentric US TDS. GI recommended asacol. Hb stable at 9.7.    Continuous Infusions:  Scheduled Meds:   allopurinol  100 mg Oral Daily    amiodarone  200 mg Oral Daily    bumetanide  2 mg Oral Daily    ferrous gluconate  324 mg Oral Daily with breakfast    magnesium oxide  400 mg Oral TID    mesalamine  1,000 mg Oral QID    pantoprazole  40 mg Intravenous BID    potassium chloride SA  20 mEq Oral Daily    warfarin  5 mg Oral Every Mon, Wed, Fri, Sun    warfarin  7.5 mg Oral Every Tues, Thurs, Sat     PRN Meds:sodium chloride, sodium chloride, sodium chloride, sodium chloride, sodium chloride, promethazine (PHENERGAN) IVPB    Review of patient's allergies indicates:   Allergen Reactions    Lisinopril Anaphylaxis     Objective:     Vital Signs (Most Recent):  Temp: 97.8 °F (36.6 °C) (07/20/19 1210)  Pulse: 62 (07/20/19 1100)  Resp: 18 (07/20/19 1142)  BP: (!) 78/0 (07/20/19 1210)  SpO2: 97 % (07/20/19 0650) Vital Signs (24h Range):  Temp:  [97.3 °F (36.3 °C)-99.8 °F (37.7 °C)] 97.8 °F (36.6 °C)  Pulse:  [60-88] 62  Resp:  [14-18] 18  SpO2:  [97 %-99 %] 97 %  BP: (78-92)/(0-68) 78/0     Patient Vitals for the past 72 hrs (Last 3 readings):   Weight   07/20/19 0511 116.9 kg (257 lb 11.5 oz)   07/18/19 0333 119.9 kg (264 lb 5.3 oz)     Body mass index is 34.95 kg/m².      Intake/Output Summary (Last 24 hours) at 7/20/2019 1433  Last data filed at 7/20/2019 0511  Gross per 24 hour   Intake 270 ml   Output 775 ml   Net -505 ml       Hemodynamic Parameters:       Telemetry: Reviewed    Physical Exam   Constitutional: He appears well-developed and well-nourished.   HENT:   Mouth/Throat: Oropharynx is clear and moist.   Eyes: EOM are normal.   Neck: No JVD present.   Cardiovascular: Normal rate and regular rhythm.   Smooth VAD hum   Pulmonary/Chest: Effort normal and breath sounds  normal. No respiratory distress. He has no rales.   Abdominal: Soft. Bowel sounds are normal. He exhibits no distension. There is no tenderness. There is no guarding.   Musculoskeletal: He exhibits no edema.   Neurological: He is alert.   Skin: Skin is warm.   Psychiatric: He has a normal mood and affect.   Nursing note and vitals reviewed.      Significant Labs:  CBC:  Recent Labs   Lab 07/18/19  1456 07/19/19  0400 07/20/19  0442   WBC 9.21 7.05 6.92   RBC 3.32* 3.09* 3.29*   HGB 9.8* 9.2* 9.7*   HCT 29.6* 28.3* 30.0*   * 109* 133*   MCV 89 92 91   MCH 29.5 29.8 29.5   MCHC 33.1 32.5 32.3     BNP:  Recent Labs   Lab 07/16/19  1305 07/17/19  0414 07/19/19  0400   BNP 47 153* 1,016*     CMP:  Recent Labs   Lab 07/16/19  1305 07/17/19  0414 07/18/19  0400 07/19/19  0400 07/20/19  0442   * 117* 84 86 106   CALCIUM 8.9 8.2* 8.2* 8.3* 8.5*   ALBUMIN 3.2* 2.9*  --  2.6*  --    PROT 6.0 5.3*  --  5.0*  --     137 135* 134* 136   K 4.5 5.1 4.4 4.1 3.9   CO2 24 24 24 25 27    104 104 103 102   BUN 28* 31* 25* 13 12   CREATININE 2.0* 2.3* 1.7* 1.5* 1.6*   ALKPHOS 60 52*  --  52*  --    ALT 15 12  --  10  --    AST 22 24  --  24  --    BILITOT 0.5 1.6*  --  0.4  --       Coagulation:   Recent Labs   Lab 07/18/19  0400 07/19/19  0400 07/20/19  0442   INR 2.1* 1.7* 1.2   APTT 32.7* 29.2 25.7     LDH:  Recent Labs   Lab 07/18/19  0400 07/19/19  0400 07/20/19  0442    227 238     Microbiology:  Microbiology Results (last 7 days)     Procedure Component Value Units Date/Time    Blood culture [[id number]] Collected:  07/17/19 1420    Order Status:  Completed Specimen:  Blood from Peripheral, Antecubital, Left Updated:  07/19/19 1612     Blood Culture, Routine No Growth to date      No Growth to date      No Growth to date    Blood culture [[id number]] Collected:  07/17/19 1452    Order Status:  Completed Specimen:  Blood from Peripheral, Hand, Right Updated:  07/19/19 1612     Blood Culture, Routine  No Growth to date      No Growth to date      No Growth to date    Stool culture [[id number]] Collected:  07/16/19 1731    Order Status:  Completed Specimen:  Stool Updated:  07/19/19 1326     Stool Culture No Salmonella,Shigella,Vibrio,Campylobacter,Yersinia isolated.    Clostridium difficile EIA [[id number]] Collected:  07/17/19 0904    Order Status:  Completed Specimen:  Stool Updated:  07/17/19 1409     C. diff Antigen Negative     C difficile Toxins A+B, EIA Negative     Comment: Testing not recommended for children <24 months old.       E. coli 0157 antigen [[id number]] Collected:  07/16/19 1731    Order Status:  Completed Specimen:  Stool Updated:  07/17/19 1046     Shiga Toxin 1 E.coli Negative     Shiga Toxin 2 E.coli Negative          BMP:   Recent Labs   Lab 07/20/19  0442         K 3.9      CO2 27   BUN 12   CREATININE 1.6*   CALCIUM 8.5*   MG 2.1     Cardiac Markers: No results for input(s): CKMB, TROPONINT, MYOGLOBIN in the last 72 hours.  Coagulation:   Recent Labs   Lab 07/20/19  0442   INR 1.2   APTT 25.7     I have reviewed all pertinent labs within the past 24 hours.    Estimated Creatinine Clearance: 71.3 mL/min (A) (based on SCr of 1.6 mg/dL (H)).    Diagnostic Results:  I have reviewed all pertinent imaging results/findings within the past 24 hours.

## 2019-07-20 NOTE — ASSESSMENT & PLAN NOTE
-Acute GIB with hematochezia  -Hgb 7.9 on admit, down from 14.5 ~ 2 weeks ago  -Transfused 10 units PRBCs total since admission without appropriate response in H&H. Hgb 9.2 -> 9.3. INR 1.2, c/w coumadin 5 mg MWFSun, 7.5 mg TTS.  -EGD yesterday unremarkable. C-scope showed non bleeding ulcer transverse colon and cecal ulcer with large clot on it  -Mesalamine started yesterday at GI's request  -GI OK'd to restart coumadin. It was resumed at lower dose of 2 mg qd. INR has dropped to 1.7  - Will not bridge given recent massive GI bleed, but will boost Coumadin  -Mesenteric US attempted 7/18 to R/O ischemia as cause for ulcers, but it was technically impossible d/t body habitus and presence of LVAD  -CT abdomen 7/16/19- no colitis  -C diff -ve  -Protonix 80 mg IV x 1 on admit then Protonix 40 IV BID  -Hold antiHTNs in the setting of recent massive GI bleed  -Transfer to step down

## 2019-07-20 NOTE — ASSESSMENT & PLAN NOTE
-Heartmate 2, implanted 3/8/18 as DT (Dr. Kaufman)  -INR sub therapeutic today,with goal 2-3. INR 1.2, c/w coumadin 5 mg MWFSun, 7.5 mg TTS.  -Hold ASA for now  -MAP goal 60-80,  will hold anti-HTNs for now given recent massive GIB (spironolactone, doxazosin, amlodipine, hydralazine)  -Resume Bumex at 2 mg qd  -TTE 7/17 at 9800 rpm: LVEDD 7.23, LVEF 13%, diastolic dysfunction, RV mildly dilated, normal RV function, mild MR, trace TR, IVC 3, small pericardial effusion, AV does not open, septum bows right    Procedure: Device Interrogation Including analysis of device parameters  Current Settings: Ventricular Assist Device  Review of device function is stable/unstable stable    TXP LVAD INTERROGATIONS 7/20/2019 7/20/2019 7/20/2019 7/20/2019 7/19/2019 7/19/2019 7/19/2019   Type HeartMate II HeartMate II HeartMate II HeartMate II HeartMate II HeartMate II HeartMate II   Flow 5.5 5.2 6.2 6.4 5.9 5.5 5.4   Speed 9800 9800 9800 9790 9800 9800 9800   PI 3.2 4.4 2.6 2.9 2.8 3.2 3.0   Power ([place]) 6.6 6.3 6.9 6.4 6.8 6.5 6.5   [facility] - 9400 - - - - 9400   Pulsatility Intermittent pulse Intermittent pulse Intermittent pulse Intermittent pulse Intermittent pulse Intermittent pulse -

## 2019-07-20 NOTE — PROGRESS NOTES
[facility]  Heart Transplant  Progress Note    Patient Name: Tim Richards  MRN: [id number]  Admission Date: 7/16/2019  Hospital Length of Stay: 4 days  Attending Physician: Amy Rhodes MD  Primary Care Provider: Ja Méndez MD  Principal Problem:GIB (gastrointestinal bleeding)    Subjective:     Interval History: Pt seen and examined today at bedside. BM in am with no blood in stools. INR 1.2, c/w coumadin 5 mg MWFSun, 7.5 mg TTS. Mesentric US TDS. GI recommended asacol. Hb stable at 9.7.    Continuous Infusions:  Scheduled Meds:   allopurinol  100 mg Oral Daily    amiodarone  200 mg Oral Daily    bumetanide  2 mg Oral Daily    ferrous gluconate  324 mg Oral Daily with breakfast    magnesium oxide  400 mg Oral TID    mesalamine  1,000 mg Oral QID    pantoprazole  40 mg Intravenous BID    potassium chloride SA  20 mEq Oral Daily    warfarin  5 mg Oral Every Mon, Wed, Fri, Sun    warfarin  7.5 mg Oral Every Tues, Thurs, Sat     PRN Meds:sodium chloride, sodium chloride, sodium chloride, sodium chloride, sodium chloride, promethazine (PHENERGAN) IVPB    Review of patient's allergies indicates:   Allergen Reactions    Lisinopril Anaphylaxis     Objective:     Vital Signs (Most Recent):  Temp: 97.8 °F (36.6 °C) (07/20/19 1210)  Pulse: 62 (07/20/19 1100)  Resp: 18 (07/20/19 1142)  BP: (!) 78/0 (07/20/19 1210)  SpO2: 97 % (07/20/19 0650) Vital Signs (24h Range):  Temp:  [97.3 °F (36.3 °C)-99.8 °F (37.7 °C)] 97.8 °F (36.6 °C)  Pulse:  [60-88] 62  Resp:  [14-18] 18  SpO2:  [97 %-99 %] 97 %  BP: (78-92)/(0-68) 78/0     Patient Vitals for the past 72 hrs (Last 3 readings):   Weight   07/20/19 0511 116.9 kg (257 lb 11.5 oz)   07/18/19 0333 119.9 kg (264 lb 5.3 oz)     Body mass index is 34.95 kg/m².      Intake/Output Summary (Last 24 hours) at 7/20/2019 1433  Last data filed at 7/20/2019 0511  Gross per 24 hour   Intake 270 ml   Output 775 ml   Net -505 ml       Hemodynamic  Parameters:       Telemetry: Paced    Physical Exam   Constitutional: He appears well-developed and well-nourished.   HENT:   Mouth/Throat: Oropharynx is clear and moist.   Eyes: EOM are normal.   Neck: No JVD present.   Cardiovascular: Normal rate and regular rhythm.   Smooth VAD hum   Pulmonary/Chest: Effort normal and breath sounds normal. No respiratory distress. He has no rales.   Abdominal: Soft. Bowel sounds are normal. He exhibits no distension. There is no tenderness. There is no guarding.   Musculoskeletal: He exhibits no edema.   Neurological: He is alert.   Skin: Skin is warm.   Psychiatric: He has a normal mood and affect.   Nursing note and vitals reviewed.      Significant Labs:  CBC:  Recent Labs   Lab 07/18/19  1456 07/19/19  0400 07/20/19  0442   WBC 9.21 7.05 6.92   RBC 3.32* 3.09* 3.29*   HGB 9.8* 9.2* 9.7*   HCT 29.6* 28.3* 30.0*   * 109* 133*   MCV 89 92 91   MCH 29.5 29.8 29.5   MCHC 33.1 32.5 32.3     BNP:  Recent Labs   Lab 07/16/19  1305 07/17/19  0414 07/19/19  0400   BNP 47 153* 1,016*     CMP:  Recent Labs   Lab 07/16/19  1305 07/17/19  0414 07/18/19  0400 07/19/19  0400 07/20/19  0442   * 117* 84 86 106   CALCIUM 8.9 8.2* 8.2* 8.3* 8.5*   ALBUMIN 3.2* 2.9*  --  2.6*  --    PROT 6.0 5.3*  --  5.0*  --     137 135* 134* 136   K 4.5 5.1 4.4 4.1 3.9   CO2 24 24 24 25 27    104 104 103 102   BUN 28* 31* 25* 13 12   CREATININE 2.0* 2.3* 1.7* 1.5* 1.6*   ALKPHOS 60 52*  --  52*  --    ALT 15 12  --  10  --    AST 22 24  --  24  --    BILITOT 0.5 1.6*  --  0.4  --       Coagulation:   Recent Labs   Lab 07/18/19  0400 07/19/19  0400 07/20/19  0442   INR 2.1* 1.7* 1.2   APTT 32.7* 29.2 25.7     LDH:  Recent Labs   Lab 07/18/19  0400 07/19/19  0400 07/20/19  0442    227 238     Microbiology:  Microbiology Results (last 7 days)     Procedure Component Value Units Date/Time    Blood culture [[id number]] Collected:  07/17/19 1420    Order Status:  Completed Specimen:   Blood from Peripheral, Antecubital, Left Updated:  07/19/19 1612     Blood Culture, Routine No Growth to date      No Growth to date      No Growth to date    Blood culture [[id number]] Collected:  07/17/19 1452    Order Status:  Completed Specimen:  Blood from Peripheral, Hand, Right Updated:  07/19/19 1612     Blood Culture, Routine No Growth to date      No Growth to date      No Growth to date    Stool culture [[id number]] Collected:  07/16/19 1731    Order Status:  Completed Specimen:  Stool Updated:  07/19/19 1326     Stool Culture No Salmonella,Shigella,Vibrio,Campylobacter,Yersinia isolated.    Clostridium difficile EIA [[id number]] Collected:  07/17/19 0904    Order Status:  Completed Specimen:  Stool Updated:  07/17/19 1409     C. diff Antigen Negative     C difficile Toxins A+B, EIA Negative     Comment: Testing not recommended for children <24 months old.       E. coli 0157 antigen [[id number]] Collected:  07/16/19 1731    Order Status:  Completed Specimen:  Stool Updated:  07/17/19 1046     Shiga Toxin 1 E.coli Negative     Shiga Toxin 2 E.coli Negative          BMP:   Recent Labs   Lab 07/20/19  0442         K 3.9      CO2 27   BUN 12   CREATININE 1.6*   CALCIUM 8.5*   MG 2.1     Cardiac Markers: No results for input(s): CKMB, TROPONINT, MYOGLOBIN in the last 72 hours.  Coagulation:   Recent Labs   Lab 07/20/19  0442   INR 1.2   APTT 25.7     I have reviewed all pertinent labs within the past 24 hours.    Estimated Creatinine Clearance: 71.3 mL/min (A) (based on SCr of 1.6 mg/dL (H)).    Diagnostic Results:  I have reviewed all pertinent imaging results/findings within the past 24 hours.    Assessment and Plan:     53 M with hx of DCM, s/p HM2 3/8/18 presents to ER with BRBPR and LH/dizziness. Pt says he was feeling fine until yesterday afternoon after lunch. He says his stomach started grumbling a lot and then he started feeling LH/dizzy with standing. He started having bloody diarrhea  around 6 pm last night. Last episode 11:30 this am. Having numbness in lips and hands at times. C/o LLQ abd pain. No N/V.     In ER, Hgb was found to be 7.9, down from 14.5 2 weeks ago      * GIB (gastrointestinal bleeding)  -Acute GIB with hematochezia  -Hgb 7.9 on admit, down from 14.5 ~ 2 weeks ago  -Transfused 10 units PRBCs total since admission without appropriate response in H&H. Hgb 9.2 -> 9.3. INR 1.2, c/w coumadin 5 mg MWFSun, 7.5 mg TTS.  -EGD yesterday unremarkable. C-scope showed non bleeding ulcer transverse colon and cecal ulcer with large clot on it  -Mesalamine started yesterday at GI's request  -GI OK'd to restart coumadin. It was resumed at lower dose of 2 mg qd. INR has dropped to 1.7  - Will not bridge given recent massive GI bleed, but will boost Coumadin  -Mesenteric US attempted 7/18 to R/O ischemia as cause for ulcers, but it was technically impossible d/t body habitus and presence of LVAD  -CT abdomen 7/16/19- no colitis  -C diff -ve  -Protonix 80 mg IV x 1 on admit then Protonix 40 IV BID  -Hold antiHTNs in the setting of recent massive GI bleed  -Transfer to step down        Smoker  - Continues to smoke occasionally    Thrombocytopenia, unspecified  - Platelet count on admit WNL, and was trending down, but is coming back up to 109K today  - Appreciate Hematology's help - anemia and thrombocytopenia due to GI bleed from colonic ulcer confirmed on endoscopy    Leukocytosis  -C diff -ve  -Stool culture in progress  -Blood cultures with NGTD  - WCC has now normalized. Leukocytosis likely 2/2 active GI bleed (see thrombocytopenia)    Anemia  -Acute anemia, due to GIB.  -Hgb 7.9 on admit, down from 14.5 ~ 2 weeks ago  -See GIB    LVAD (left ventricular assist device) present  -Heartmate 2, implanted 3/8/18 as DT (Dr. Kaufman)  -INR sub therapeutic today,with goal 2-3. INR 1.2, c/w coumadin 5 mg MWFSun, 7.5 mg TTS.  -Hold ASA for now  -MAP goal 60-80,  will hold anti-HTNs for now given recent  massive GIB (spironolactone, doxazosin, amlodipine, hydralazine)  -Resume Bumex at 2 mg qd  -TTE 7/17 at 9800 rpm: LVEDD 7.23, LVEF 13%, diastolic dysfunction, RV mildly dilated, normal RV function, mild MR, trace TR, IVC 3, small pericardial effusion, AV does not open, septum bows right    Procedure: Device Interrogation Including analysis of device parameters  Current Settings: Ventricular Assist Device  Review of device function is stable/unstable stable    TXP LVAD INTERROGATIONS 7/20/2019 7/20/2019 7/20/2019 7/20/2019 7/19/2019 7/19/2019 7/19/2019   Type HeartMate II HeartMate II HeartMate II HeartMate II HeartMate II HeartMate II HeartMate II   Flow 5.5 5.2 6.2 6.4 5.9 5.5 5.4   Speed 9800 9800 9800 9790 9800 9800 9800   PI 3.2 4.4 2.6 2.9 2.8 3.2 3.0   Power (Jackson) 6.6 6.3 6.9 6.4 6.8 6.5 6.5   LSL - 9400 - - - - 9400   Pulsatility Intermittent pulse Intermittent pulse Intermittent pulse Intermittent pulse Intermittent pulse Intermittent pulse -       CKD (chronic kidney disease), stage III  - Baseline creatinine ~ 1.7-2.1, 1.5 today    PVT (paroxysmal ventricular tachycardia)  -Cont Amio  -ICD in place    D/W Dr. Leonie Hopkins MD  Heart Transplant  [facility]

## 2019-07-21 ENCOUNTER — PATIENT MESSAGE (OUTPATIENT)
Dept: TRANSPLANT | Facility: CLINIC | Age: 54
End: 2019-07-21

## 2019-07-21 LAB
ANION GAP SERPL CALC-SCNC: 8 MMOL/L (ref 8–16)
APTT BLDCRRT: 25.4 SEC (ref 21–32)
BASOPHILS # BLD AUTO: 0.03 K/UL (ref 0–0.2)
BASOPHILS NFR BLD: 0.5 % (ref 0–1.9)
BNP SERPL-MCNC: 362 PG/ML (ref 0–99)
BUN SERPL-MCNC: 10 MG/DL (ref 6–20)
CALCIUM SERPL-MCNC: 9.1 MG/DL (ref 8.7–10.5)
CHLORIDE SERPL-SCNC: 101 MMOL/L (ref 95–110)
CO2 SERPL-SCNC: 26 MMOL/L (ref 23–29)
CREAT SERPL-MCNC: 1.5 MG/DL (ref 0.5–1.4)
DIFFERENTIAL METHOD: ABNORMAL
EOSINOPHIL # BLD AUTO: 0 K/UL (ref 0–0.5)
EOSINOPHIL NFR BLD: 0 % (ref 0–8)
ERYTHROCYTE [DISTWIDTH] IN BLOOD BY AUTOMATED COUNT: 15.9 % (ref 11.5–14.5)
EST. GFR  (AFRICAN AMERICAN): >60 ML/MIN/1.73 M^2
EST. GFR  (NON AFRICAN AMERICAN): 52.8 ML/MIN/1.73 M^2
GLUCOSE SERPL-MCNC: 104 MG/DL (ref 70–110)
HCT VFR BLD AUTO: 31.4 % (ref 40–54)
HGB BLD-MCNC: 10.1 G/DL (ref 14–18)
IMM GRANULOCYTES # BLD AUTO: 0.01 K/UL (ref 0–0.04)
IMM GRANULOCYTES NFR BLD AUTO: 0.2 % (ref 0–0.5)
INR PPP: 1.1 (ref 0.8–1.2)
LDH SERPL L TO P-CCNC: 254 U/L (ref 110–260)
LYMPHOCYTES # BLD AUTO: 1 K/UL (ref 1–4.8)
LYMPHOCYTES NFR BLD: 15.3 % (ref 18–48)
MAGNESIUM SERPL-MCNC: 2 MG/DL (ref 1.6–2.6)
MCH RBC QN AUTO: 29.1 PG (ref 27–31)
MCHC RBC AUTO-ENTMCNC: 32.2 G/DL (ref 32–36)
MCV RBC AUTO: 91 FL (ref 82–98)
MONOCYTES # BLD AUTO: 0.8 K/UL (ref 0.3–1)
MONOCYTES NFR BLD: 12.9 % (ref 4–15)
NEUTROPHILS # BLD AUTO: 4.7 K/UL (ref 1.8–7.7)
NEUTROPHILS NFR BLD: 71.1 % (ref 38–73)
NRBC BLD-RTO: 0 /100 WBC
PHOSPHATE SERPL-MCNC: 3.6 MG/DL (ref 2.7–4.5)
PLATELET # BLD AUTO: 156 K/UL (ref 150–350)
PMV BLD AUTO: 10.5 FL (ref 9.2–12.9)
POTASSIUM SERPL-SCNC: 3.9 MMOL/L (ref 3.5–5.1)
PROTHROMBIN TIME: 10.9 SEC (ref 9–12.5)
RBC # BLD AUTO: 3.47 M/UL (ref 4.6–6.2)
SODIUM SERPL-SCNC: 135 MMOL/L (ref 136–145)
WBC # BLD AUTO: 6.53 K/UL (ref 3.9–12.7)

## 2019-07-21 PROCEDURE — 85730 THROMBOPLASTIN TIME PARTIAL: CPT

## 2019-07-21 PROCEDURE — 85025 COMPLETE CBC W/AUTO DIFF WBC: CPT

## 2019-07-21 PROCEDURE — 83880 ASSAY OF NATRIURETIC PEPTIDE: CPT

## 2019-07-21 PROCEDURE — 25000003 PHARM REV CODE 250: Performed by: INTERNAL MEDICINE

## 2019-07-21 PROCEDURE — 80048 BASIC METABOLIC PNL TOTAL CA: CPT

## 2019-07-21 PROCEDURE — 20600001 HC STEP DOWN PRIVATE ROOM

## 2019-07-21 PROCEDURE — 93750 PR INTERROGATE VENT ASSIST DEV, IN PERSON, W PHYSICIAN ANALYSIS: ICD-10-PCS | Mod: ,,, | Performed by: INTERNAL MEDICINE

## 2019-07-21 PROCEDURE — 99232 PR SUBSEQUENT HOSPITAL CARE,LEVL II: ICD-10-PCS | Mod: 25,,, | Performed by: INTERNAL MEDICINE

## 2019-07-21 PROCEDURE — 84100 ASSAY OF PHOSPHORUS: CPT

## 2019-07-21 PROCEDURE — 85610 PROTHROMBIN TIME: CPT

## 2019-07-21 PROCEDURE — 25000003 PHARM REV CODE 250: Performed by: NURSE PRACTITIONER

## 2019-07-21 PROCEDURE — 63600175 PHARM REV CODE 636 W HCPCS: Performed by: PHYSICIAN ASSISTANT

## 2019-07-21 PROCEDURE — 27000248 HC VAD-ADDITIONAL DAY

## 2019-07-21 PROCEDURE — 83735 ASSAY OF MAGNESIUM: CPT

## 2019-07-21 PROCEDURE — 83615 LACTATE (LD) (LDH) ENZYME: CPT

## 2019-07-21 PROCEDURE — 99232 SBSQ HOSP IP/OBS MODERATE 35: CPT | Mod: 25,,, | Performed by: INTERNAL MEDICINE

## 2019-07-21 PROCEDURE — 93750 INTERROGATION VAD IN PERSON: CPT | Mod: ,,, | Performed by: INTERNAL MEDICINE

## 2019-07-21 PROCEDURE — 36415 COLL VENOUS BLD VENIPUNCTURE: CPT

## 2019-07-21 PROCEDURE — 25000003 PHARM REV CODE 250: Performed by: STUDENT IN AN ORGANIZED HEALTH CARE EDUCATION/TRAINING PROGRAM

## 2019-07-21 PROCEDURE — C9113 INJ PANTOPRAZOLE SODIUM, VIA: HCPCS | Performed by: PHYSICIAN ASSISTANT

## 2019-07-21 RX ORDER — WARFARIN SODIUM 5 MG/1
10 TABLET ORAL DAILY
Status: DISCONTINUED | OUTPATIENT
Start: 2019-07-21 | End: 2019-07-23 | Stop reason: HOSPADM

## 2019-07-21 RX ADMIN — MESALAMINE 1000 MG: 250 CAPSULE ORAL at 12:07

## 2019-07-21 RX ADMIN — MAGNESIUM OXIDE TAB 400 MG (241.3 MG ELEMENTAL MG) 400 MG: 400 (241.3 MG) TAB at 04:07

## 2019-07-21 RX ADMIN — ALLOPURINOL 100 MG: 100 TABLET ORAL at 09:07

## 2019-07-21 RX ADMIN — PANTOPRAZOLE SODIUM 40 MG: 40 INJECTION, POWDER, FOR SOLUTION INTRAVENOUS at 08:07

## 2019-07-21 RX ADMIN — BUMETANIDE 2 MG: 1 TABLET ORAL at 08:07

## 2019-07-21 RX ADMIN — POTASSIUM CHLORIDE 20 MEQ: 1500 TABLET, EXTENDED RELEASE ORAL at 08:07

## 2019-07-21 RX ADMIN — FERROUS GLUCONATE TAB 324 MG (37.5 MG ELEMENTAL IRON) 324 MG: 324 (37.5 FE) TAB at 08:07

## 2019-07-21 RX ADMIN — MAGNESIUM OXIDE TAB 400 MG (241.3 MG ELEMENTAL MG) 400 MG: 400 (241.3 MG) TAB at 08:07

## 2019-07-21 RX ADMIN — AMIODARONE HYDROCHLORIDE 200 MG: 200 TABLET ORAL at 08:07

## 2019-07-21 RX ADMIN — WARFARIN SODIUM 10 MG: 5 TABLET ORAL at 04:07

## 2019-07-21 RX ADMIN — MESALAMINE 1000 MG: 250 CAPSULE ORAL at 08:07

## 2019-07-21 RX ADMIN — MESALAMINE 1000 MG: 250 CAPSULE ORAL at 04:07

## 2019-07-21 NOTE — PROGRESS NOTES
07/21/2019  Fitz Christianson    Current provider:  Amy Rhodes MD      I, Fitz Christianson, rounded on Tim Richards to ensure all mechanical assist device settings (IABP or VAD) were appropriate and all parameters were within limits.  I was able to ensure all back up equipment was present, the staff had no issues, and the Perfusion Department daily rounding was complete.    9:00 AM

## 2019-07-21 NOTE — ASSESSMENT & PLAN NOTE
-Heartmate 2, implanted 3/8/18 as DT (Dr. Kaufman)  -INR sub therapeutic today,with goal 2-3. INR 1.2, coumadin 10 mg today  -Hold ASA for now  -MAP goal 60-80,  will hold anti-HTNs for now given recent massive GIB (spironolactone, doxazosin, amlodipine, hydralazine)  -Resumed Bumex last week 2 mg qd, BNP 1000 -> 300, not much neck veins. Consider holding   -TTE 7/17 at 9800 rpm: LVEDD 7.23, LVEF 13%, diastolic dysfunction, RV mildly dilated, normal RV function, mild MR, trace TR, IVC 3, small pericardial effusion, AV does not open, septum bows right    Procedure: Device Interrogation Including analysis of device parameters  Current Settings: Ventricular Assist Device  Review of device function is stable/unstable stable    TXP LVAD INTERROGATIONS 7/21/2019 7/21/2019 7/20/2019 7/20/2019 7/20/2019 7/20/2019 7/20/2019   Type HeartMate II HeartMate II HeartMate II HeartMate II HeartMate II HeartMate II HeartMate II   Flow 4.8 5.3 5.2 5.2 4.5 5.5 5.2   Speed 9800 9800 9800 9800 9800 9800 9800   PI 3.3 3.5 3.8 3.2 2.5 3.2 4.4   Power ([place]) 6.1 6.4 6.3 6.3 5.8 6.6 6.3   LSL 9400 - - - - - 9400   Pulsatility Intermittent pulse - - Intermittent pulse Intermittent pulse Intermittent pulse Intermittent pulse

## 2019-07-21 NOTE — ASSESSMENT & PLAN NOTE
-Acute GIB with hematochezia  -Hgb 7.9 on admit, down from 14.5 ~ 2 weeks ago  -Transfused 10 units PRBCs total since admission without appropriate response in H&H. Hgb 9.2 -> 9.3. INR 1.2, coumadin 10 mg today.  -EGD yesterday unremarkable. C-scope showed non bleeding ulcer transverse colon and cecal ulcer with large clot on it  -Mesalamine started yesterday at GI's request  -GI OK'd to restart coumadin. It was resumed at lower dose of 2 mg qd.   - Will not bridge given recent massive GI bleed, but will boost Coumadin  -Mesenteric US attempted 7/18 to R/O ischemia as cause for ulcers, but it was technically impossible d/t body habitus and presence of LVAD  -CT abdomen 7/16/19- no colitis  -C diff -ve  -Protonix 80 mg IV x 1 on admit then Protonix 40 IV BID  -Hold antiHTNs in the setting of recent massive GI bleed  -Transfer to step down

## 2019-07-21 NOTE — PLAN OF CARE
Problem: Adult Inpatient Plan of Care  Goal: Plan of Care Review  Outcome: Ongoing (interventions implemented as appropriate)  Pt free from fall, injury, and trauma throughout shift. VSS. No c/o chest pain or SOB. LVAD dopplers and numbers WDL. Fall risk precautions reviewed and maintained. Pt verbalized understanding. All questions encouraged and answered. Will continue to monitor.

## 2019-07-21 NOTE — PROGRESS NOTES
[facility]  Heart Transplant  Progress Note    Patient Name: Tim Richards  MRN: [id number]  Admission Date: 7/16/2019  Hospital Length of Stay: 5 days  Attending Physician: Amy Rhodes MD  Primary Care Provider: Ja Méndez MD  Principal Problem:GIB (gastrointestinal bleeding)    Subjective:     Interval History: Pt seen and examined today at bedside. BM yesterday morning with no blood in stools. None today.  INR 1.2, Will give 10 mg coumadin today. Mesentric US TDS. GI recommended asacol. Hb stable at 9.7. No elevated neck veins    Continuous Infusions:  Scheduled Meds:   allopurinol  100 mg Oral Daily    amiodarone  200 mg Oral Daily    bumetanide  2 mg Oral Daily    ferrous gluconate  324 mg Oral Daily with breakfast    magnesium oxide  400 mg Oral TID    mesalamine  1,000 mg Oral QID    pantoprazole  40 mg Intravenous BID    potassium chloride SA  20 mEq Oral Daily    warfarin  5 mg Oral Every Mon, Wed, Fri, Sun    warfarin  7.5 mg Oral Every Tues, Thurs, Sat     PRN Meds:sodium chloride, sodium chloride, sodium chloride, sodium chloride, sodium chloride, promethazine (PHENERGAN) IVPB    Review of patient's allergies indicates:   Allergen Reactions    Lisinopril Anaphylaxis     Objective:     Vital Signs (Most Recent):  Temp: 98.1 °F (36.7 °C) (07/21/19 0715)  Pulse: 64 (07/21/19 0715)  Resp: 16 (07/21/19 0715)  BP: (!) 78/0 (07/21/19 0715)  SpO2: 95 % (07/21/19 0715) Vital Signs (24h Range):  Temp:  [97.8 °F (36.6 °C)-98.7 °F (37.1 °C)] 98.1 °F (36.7 °C)  Pulse:  [61-76] 64  Resp:  [15-18] 16  SpO2:  [95 %-99 %] 95 %  BP: (78-84)/(0) 78/0     Patient Vitals for the past 72 hrs (Last 3 readings):   Weight   07/21/19 0500 117.6 kg (259 lb 4.2 oz)   07/20/19 0511 116.9 kg (257 lb 11.5 oz)     Body mass index is 35.16 kg/m².      Intake/Output Summary (Last 24 hours) at 7/21/2019 1110  Last data filed at 7/21/2019 0500  Gross per 24 hour   Intake 420 ml   Output 1700  ml   Net -1280 ml       Hemodynamic Parameters:       Telemetry: Paced    Physical Exam   Constitutional: He is oriented to person, place, and time. He appears well-developed and well-nourished.   HENT:   Mouth/Throat: Oropharynx is clear and moist.   Neck: No JVD present.   Cardiovascular: Normal rate and regular rhythm.   Smooth VAD hum   Pulmonary/Chest: Effort normal and breath sounds normal. No respiratory distress. He has no rales.   Abdominal: Soft. Bowel sounds are normal. He exhibits no distension. There is no tenderness. There is no guarding.   Musculoskeletal: He exhibits edema (Trace).   Neurological: He is alert and oriented to person, place, and time.   Skin: Skin is warm.   Psychiatric: He has a normal mood and affect.   Nursing note and vitals reviewed.      Significant Labs:  CBC:  Recent Labs   Lab 07/19/19  0400 07/20/19  0442 07/21/19  0430   WBC 7.05 6.92 6.53   RBC 3.09* 3.29* 3.47*   HGB 9.2* 9.7* 10.1*   HCT 28.3* 30.0* 31.4*   * 133* 156   MCV 92 91 91   MCH 29.8 29.5 29.1   MCHC 32.5 32.3 32.2     BNP:  Recent Labs   Lab 07/17/19  0414 07/19/19  0400 07/21/19  0430   * 1,016* 362*     CMP:  Recent Labs   Lab 07/16/19  1305 07/17/19  0414  07/19/19  0400 07/20/19  0442 07/21/19  0430   * 117*   < > 86 106 104   CALCIUM 8.9 8.2*   < > 8.3* 8.5* 9.1   ALBUMIN 3.2* 2.9*  --  2.6*  --   --    PROT 6.0 5.3*  --  5.0*  --   --     137   < > 134* 136 135*   K 4.5 5.1   < > 4.1 3.9 3.9   CO2 24 24   < > 25 27 26    104   < > 103 102 101   BUN 28* 31*   < > 13 12 10   CREATININE 2.0* 2.3*   < > 1.5* 1.6* 1.5*   ALKPHOS 60 52*  --  52*  --   --    ALT 15 12  --  10  --   --    AST 22 24  --  24  --   --    BILITOT 0.5 1.6*  --  0.4  --   --     < > = values in this interval not displayed.      Coagulation:   Recent Labs   Lab 07/19/19 0400 07/20/19 0442 07/21/19  0430   INR 1.7* 1.2 1.1   APTT 29.2 25.7 25.4     LDH:  Recent Labs   Lab 07/19/19 0400 07/20/19 0442  07/21/19  0430    238 254     Microbiology:  Microbiology Results (last 7 days)     Procedure Component Value Units Date/Time    Blood culture [[id number]] Collected:  07/17/19 1452    Order Status:  Completed Specimen:  Blood from Peripheral, Hand, Right Updated:  07/20/19 1612     Blood Culture, Routine No Growth to date      No Growth to date      No Growth to date      No Growth to date    Blood culture [[id number]] Collected:  07/17/19 1420    Order Status:  Completed Specimen:  Blood from Peripheral, Antecubital, Left Updated:  07/20/19 1612     Blood Culture, Routine No Growth to date      No Growth to date      No Growth to date      No Growth to date    Stool culture [[id number]] Collected:  07/16/19 1731    Order Status:  Completed Specimen:  Stool Updated:  07/19/19 1326     Stool Culture No Salmonella,Shigella,Vibrio,Campylobacter,Yersinia isolated.    Clostridium difficile EIA [[id number]] Collected:  07/17/19 0904    Order Status:  Completed Specimen:  Stool Updated:  07/17/19 1409     C. diff Antigen Negative     C difficile Toxins A+B, EIA Negative     Comment: Testing not recommended for children <24 months old.       E. coli 0157 antigen [[id number]] Collected:  07/16/19 1731    Order Status:  Completed Specimen:  Stool Updated:  07/17/19 1046     Shiga Toxin 1 E.coli Negative     Shiga Toxin 2 E.coli Negative          BMP:   Recent Labs   Lab 07/21/19  0430      *   K 3.9      CO2 26   BUN 10   CREATININE 1.5*   CALCIUM 9.1   MG 2.0     Cardiac Markers: No results for input(s): CKMB, TROPONINT, MYOGLOBIN in the last 72 hours.  Coagulation:   Recent Labs   Lab 07/21/19  0430   INR 1.1   APTT 25.4     I have reviewed all pertinent labs within the past 24 hours.    Estimated Creatinine Clearance: 76.3 mL/min (A) (based on SCr of 1.5 mg/dL (H)).    Diagnostic Results:  I have reviewed all pertinent imaging results/findings within the past 24 hours.    Assessment and Plan:     53 M  with hx of DCM, s/p HM2 3/8/18 presents to ER with BRBPR and LH/dizziness. Pt says he was feeling fine until yesterday afternoon after lunch. He says his stomach started grumbling a lot and then he started feeling LH/dizzy with standing. He started having bloody diarrhea around 6 pm last night. Last episode 11:30 this am. Having numbness in lips and hands at times. C/o LLQ abd pain. No N/V.     In ER, Hgb was found to be 7.9, down from 14.5 2 weeks ago      * GIB (gastrointestinal bleeding)  -Acute GIB with hematochezia  -Hgb 7.9 on admit, down from 14.5 ~ 2 weeks ago  -Transfused 10 units PRBCs total since admission without appropriate response in H&H. Hgb 9.2 -> 9.3. INR 1.2, coumadin 10 mg today.  -EGD yesterday unremarkable. C-scope showed non bleeding ulcer transverse colon and cecal ulcer with large clot on it  -Mesalamine started yesterday at GI's request  -GI OK'd to restart coumadin. It was resumed at lower dose of 2 mg qd.   - Will not bridge given recent massive GI bleed, but will boost Coumadin  -Mesenteric US attempted 7/18 to R/O ischemia as cause for ulcers, but it was technically impossible d/t body habitus and presence of LVAD  -CT abdomen 7/16/19- no colitis  -C diff -ve  -Protonix 80 mg IV x 1 on admit then Protonix 40 IV BID  -Hold antiHTNs in the setting of recent massive GI bleed  -Transfer to step down        Smoker  - Continues to smoke occasionally    Thrombocytopenia, unspecified  - Platelet count on admit WNL, and was trending down, but is coming back up to 109K today  - Appreciate Hematology's help - anemia and thrombocytopenia due to GI bleed from colonic ulcer confirmed on endoscopy    Leukocytosis  -C diff -ve  -Stool culture in progress  -Blood cultures with NGTD  - WCC has now normalized. Leukocytosis likely 2/2 active GI bleed (see thrombocytopenia)    Anemia  -Acute anemia, due to GIB. 10.2 now  -Hgb 7.9 on admit, down from 14.5 ~ 2 weeks ago  -See GIB    LVAD (left ventricular  assist device) present  -Heartmate 2, implanted 3/8/18 as DT (Dr. Kaufman)  -INR sub therapeutic today,with goal 2-3. INR 1.2, coumadin 10 mg today  -Hold ASA for now  -MAP goal 60-80,  will hold anti-HTNs for now given recent massive GIB (spironolactone, doxazosin, amlodipine, hydralazine)  -Resumed Bumex last week 2 mg qd, BNP 1000 -> 300, not much neck veins. Consider holding   -TTE 7/17 at 9800 rpm: LVEDD 7.23, LVEF 13%, diastolic dysfunction, RV mildly dilated, normal RV function, mild MR, trace TR, IVC 3, small pericardial effusion, AV does not open, septum bows right    Procedure: Device Interrogation Including analysis of device parameters  Current Settings: Ventricular Assist Device  Review of device function is stable/unstable stable    TXP LVAD INTERROGATIONS 7/21/2019 7/21/2019 7/20/2019 7/20/2019 7/20/2019 7/20/2019 7/20/2019   Type HeartMate II HeartMate II HeartMate II HeartMate II HeartMate II HeartMate II HeartMate II   Flow 4.8 5.3 5.2 5.2 4.5 5.5 5.2   Speed 9800 9800 9800 9800 9800 9800 9800   PI 3.3 3.5 3.8 3.2 2.5 3.2 4.4   Power ([place]) 6.1 6.4 6.3 6.3 5.8 6.6 6.3   LSL 9400 - - - - - 9400   Pulsatility Intermittent pulse - - Intermittent pulse Intermittent pulse Intermittent pulse Intermittent pulse       CKD (chronic kidney disease), stage III  - Baseline creatinine ~ 1.7-2.1, 1.5 today    PVT (paroxysmal ventricular tachycardia)  -Cont Amio  -ICD in place      D/W Dr. Leonie Hopkins MD  Heart Transplant  [facility]

## 2019-07-21 NOTE — NURSING
"Pt LVAD dressing change due yesterday. Due every other day and done by pt. RN placed Kit it room along with additional supplies and pt stated he would do it later that night. Information passed on in report. Overnight, night RN continuously encouraged pt to do dressing change and offered to do it. Pt refused, stating he would get to it. Today, writer started shift by encouraging pt to do dressing change and offering to do it for him. Pt refused, stating he would do it after he got cleaned up. A few hours later pt still had not done dressing change, RN asked pt to change dressing and offered to do it herself again. Pt again refused stating "I sometimes go a week without changing my dressing, I don't know why you all are so worried about this." RN educated pt on risks of driveline infection if dressing not changed. Pt still refusing to change dressing at this time, stating "I'll get to it'. Will continue to monitor.   "

## 2019-07-21 NOTE — PLAN OF CARE
Problem: Adult Inpatient Plan of Care  Goal: Plan of Care Review  Outcome: Ongoing (interventions implemented as appropriate)  Plan of care discussed with patient.  Patient ambulating independently, fall precautions in place. LVAD DP and numbers WNL, smooth LVAD hum. Patient has no complaints of pain. Discussed medications and care. INR resulted at 1.1 this am. Warfarin to be adjusted accordingly. Patient has no questions at this time. Will continue to monitor.

## 2019-07-21 NOTE — SUBJECTIVE & OBJECTIVE
Interval History: Pt seen and examined today at bedside. BM yesterday morning with no blood in stools. None today.  INR 1.2, Will give 10 mg coumadin today. Mesentric US TDS. GI recommended asacol. Hb stable at 9.7. No elevated neck veins    Continuous Infusions:  Scheduled Meds:   allopurinol  100 mg Oral Daily    amiodarone  200 mg Oral Daily    bumetanide  2 mg Oral Daily    ferrous gluconate  324 mg Oral Daily with breakfast    magnesium oxide  400 mg Oral TID    mesalamine  1,000 mg Oral QID    pantoprazole  40 mg Intravenous BID    potassium chloride SA  20 mEq Oral Daily    warfarin  5 mg Oral Every Mon, Wed, Fri, Sun    warfarin  7.5 mg Oral Every Tues, Thurs, Sat     PRN Meds:sodium chloride, sodium chloride, sodium chloride, sodium chloride, sodium chloride, promethazine (PHENERGAN) IVPB    Review of patient's allergies indicates:   Allergen Reactions    Lisinopril Anaphylaxis     Objective:     Vital Signs (Most Recent):  Temp: 98.1 °F (36.7 °C) (07/21/19 0715)  Pulse: 64 (07/21/19 0715)  Resp: 16 (07/21/19 0715)  BP: (!) 78/0 (07/21/19 0715)  SpO2: 95 % (07/21/19 0715) Vital Signs (24h Range):  Temp:  [97.8 °F (36.6 °C)-98.7 °F (37.1 °C)] 98.1 °F (36.7 °C)  Pulse:  [61-76] 64  Resp:  [15-18] 16  SpO2:  [95 %-99 %] 95 %  BP: (78-84)/(0) 78/0     Patient Vitals for the past 72 hrs (Last 3 readings):   Weight   07/21/19 0500 117.6 kg (259 lb 4.2 oz)   07/20/19 0511 116.9 kg (257 lb 11.5 oz)     Body mass index is 35.16 kg/m².      Intake/Output Summary (Last 24 hours) at 7/21/2019 1110  Last data filed at 7/21/2019 0500  Gross per 24 hour   Intake 420 ml   Output 1700 ml   Net -1280 ml       Hemodynamic Parameters:       Telemetry: Paced    Physical Exam   Constitutional: He is oriented to person, place, and time. He appears well-developed and well-nourished.   HENT:   Mouth/Throat: Oropharynx is clear and moist.   Neck: No JVD present.   Cardiovascular: Normal rate and regular rhythm.   Smooth  VAD hum   Pulmonary/Chest: Effort normal and breath sounds normal. No respiratory distress. He has no rales.   Abdominal: Soft. Bowel sounds are normal. He exhibits no distension. There is no tenderness. There is no guarding.   Musculoskeletal: He exhibits edema (Trace).   Neurological: He is alert and oriented to person, place, and time.   Skin: Skin is warm.   Psychiatric: He has a normal mood and affect.   Nursing note and vitals reviewed.      Significant Labs:  CBC:  Recent Labs   Lab 07/19/19  0400 07/20/19 0442 07/21/19  0430   WBC 7.05 6.92 6.53   RBC 3.09* 3.29* 3.47*   HGB 9.2* 9.7* 10.1*   HCT 28.3* 30.0* 31.4*   * 133* 156   MCV 92 91 91   MCH 29.8 29.5 29.1   MCHC 32.5 32.3 32.2     BNP:  Recent Labs   Lab 07/17/19  0414 07/19/19  0400 07/21/19  0430   * 1,016* 362*     CMP:  Recent Labs   Lab 07/16/19  1305 07/17/19  0414  07/19/19  0400 07/20/19  0442 07/21/19  0430   * 117*   < > 86 106 104   CALCIUM 8.9 8.2*   < > 8.3* 8.5* 9.1   ALBUMIN 3.2* 2.9*  --  2.6*  --   --    PROT 6.0 5.3*  --  5.0*  --   --     137   < > 134* 136 135*   K 4.5 5.1   < > 4.1 3.9 3.9   CO2 24 24   < > 25 27 26    104   < > 103 102 101   BUN 28* 31*   < > 13 12 10   CREATININE 2.0* 2.3*   < > 1.5* 1.6* 1.5*   ALKPHOS 60 52*  --  52*  --   --    ALT 15 12  --  10  --   --    AST 22 24  --  24  --   --    BILITOT 0.5 1.6*  --  0.4  --   --     < > = values in this interval not displayed.      Coagulation:   Recent Labs   Lab 07/19/19  0400 07/20/19  0442 07/21/19  0430   INR 1.7* 1.2 1.1   APTT 29.2 25.7 25.4     LDH:  Recent Labs   Lab 07/19/19  0400 07/20/19  0442 07/21/19  0430    238 254     Microbiology:  Microbiology Results (last 7 days)     Procedure Component Value Units Date/Time    Blood culture [[id number]] Collected:  07/17/19 1452    Order Status:  Completed Specimen:  Blood from Peripheral, Hand, Right Updated:  07/20/19 1612     Blood Culture, Routine No Growth to date       No Growth to date      No Growth to date      No Growth to date    Blood culture [[id number]] Collected:  07/17/19 1420    Order Status:  Completed Specimen:  Blood from Peripheral, Antecubital, Left Updated:  07/20/19 1612     Blood Culture, Routine No Growth to date      No Growth to date      No Growth to date      No Growth to date    Stool culture [[id number]] Collected:  07/16/19 1731    Order Status:  Completed Specimen:  Stool Updated:  07/19/19 1326     Stool Culture No Salmonella,Shigella,Vibrio,Campylobacter,Yersinia isolated.    Clostridium difficile EIA [[id number]] Collected:  07/17/19 0904    Order Status:  Completed Specimen:  Stool Updated:  07/17/19 1409     C. diff Antigen Negative     C difficile Toxins A+B, EIA Negative     Comment: Testing not recommended for children <24 months old.       E. coli 0157 antigen [[id number]] Collected:  07/16/19 1731    Order Status:  Completed Specimen:  Stool Updated:  07/17/19 1046     Shiga Toxin 1 E.coli Negative     Shiga Toxin 2 E.coli Negative          BMP:   Recent Labs   Lab 07/21/19  0430      *   K 3.9      CO2 26   BUN 10   CREATININE 1.5*   CALCIUM 9.1   MG 2.0     Cardiac Markers: No results for input(s): CKMB, TROPONINT, MYOGLOBIN in the last 72 hours.  Coagulation:   Recent Labs   Lab 07/21/19  0430   INR 1.1   APTT 25.4     I have reviewed all pertinent labs within the past 24 hours.    Estimated Creatinine Clearance: 76.3 mL/min (A) (based on SCr of 1.5 mg/dL (H)).    Diagnostic Results:  I have reviewed all pertinent imaging results/findings within the past 24 hours.

## 2019-07-22 ENCOUNTER — PATIENT MESSAGE (OUTPATIENT)
Dept: TRANSPLANT | Facility: CLINIC | Age: 54
End: 2019-07-22

## 2019-07-22 LAB
ALBUMIN SERPL BCP-MCNC: 3.3 G/DL (ref 3.5–5.2)
ALP SERPL-CCNC: 84 U/L (ref 55–135)
ALT SERPL W/O P-5'-P-CCNC: 27 U/L (ref 10–44)
ANION GAP SERPL CALC-SCNC: 10 MMOL/L (ref 8–16)
APTT BLDCRRT: 26.7 SEC (ref 21–32)
APTT BLDCRRT: 26.9 SEC (ref 21–32)
APTT BLDCRRT: 31 SEC (ref 21–32)
AST SERPL-CCNC: 36 U/L (ref 10–40)
BACTERIA BLD CULT: NORMAL
BACTERIA BLD CULT: NORMAL
BASOPHILS # BLD AUTO: 0.02 K/UL (ref 0–0.2)
BASOPHILS NFR BLD: 0.3 % (ref 0–1.9)
BILIRUB DIRECT SERPL-MCNC: 0.1 MG/DL (ref 0.1–0.3)
BILIRUB SERPL-MCNC: 0.3 MG/DL (ref 0.1–1)
BNP SERPL-MCNC: 312 PG/ML (ref 0–99)
BUN SERPL-MCNC: 14 MG/DL (ref 6–20)
CALCIUM SERPL-MCNC: 9.4 MG/DL (ref 8.7–10.5)
CHLORIDE SERPL-SCNC: 103 MMOL/L (ref 95–110)
CO2 SERPL-SCNC: 24 MMOL/L (ref 23–29)
CREAT SERPL-MCNC: 1.6 MG/DL (ref 0.5–1.4)
CRP SERPL-MCNC: 3.51 MG/L (ref 0–3.19)
DIFFERENTIAL METHOD: ABNORMAL
EOSINOPHIL # BLD AUTO: 0 K/UL (ref 0–0.5)
EOSINOPHIL NFR BLD: 0 % (ref 0–8)
ERYTHROCYTE [DISTWIDTH] IN BLOOD BY AUTOMATED COUNT: 15.9 % (ref 11.5–14.5)
EST. GFR  (AFRICAN AMERICAN): 56.4 ML/MIN/1.73 M^2
EST. GFR  (NON AFRICAN AMERICAN): 48.8 ML/MIN/1.73 M^2
GLUCOSE SERPL-MCNC: 103 MG/DL (ref 70–110)
HCT VFR BLD AUTO: 31.9 % (ref 40–54)
HGB BLD-MCNC: 10.2 G/DL (ref 14–18)
IMM GRANULOCYTES # BLD AUTO: 0.02 K/UL (ref 0–0.04)
IMM GRANULOCYTES NFR BLD AUTO: 0.3 % (ref 0–0.5)
INR PPP: 1.1 (ref 0.8–1.2)
INR PPP: 1.2 (ref 0.8–1.2)
INR PPP: 1.3 (ref 0.8–1.2)
LDH SERPL L TO P-CCNC: 260 U/L (ref 110–260)
LYMPHOCYTES # BLD AUTO: 1 K/UL (ref 1–4.8)
LYMPHOCYTES NFR BLD: 15.2 % (ref 18–48)
MAGNESIUM SERPL-MCNC: 2.2 MG/DL (ref 1.6–2.6)
MCH RBC QN AUTO: 29.3 PG (ref 27–31)
MCHC RBC AUTO-ENTMCNC: 32 G/DL (ref 32–36)
MCV RBC AUTO: 92 FL (ref 82–98)
MONOCYTES # BLD AUTO: 0.8 K/UL (ref 0.3–1)
MONOCYTES NFR BLD: 12.6 % (ref 4–15)
NEUTROPHILS # BLD AUTO: 4.8 K/UL (ref 1.8–7.7)
NEUTROPHILS NFR BLD: 71.6 % (ref 38–73)
NRBC BLD-RTO: 0 /100 WBC
PHOSPHATE SERPL-MCNC: 3.1 MG/DL (ref 2.7–4.5)
PLATELET # BLD AUTO: 183 K/UL (ref 150–350)
PMV BLD AUTO: 10.8 FL (ref 9.2–12.9)
POTASSIUM SERPL-SCNC: 4.5 MMOL/L (ref 3.5–5.1)
PREALB SERPL-MCNC: 21 MG/DL (ref 20–43)
PROT SERPL-MCNC: 6.8 G/DL (ref 6–8.4)
PROTHROMBIN TIME: 11.6 SEC (ref 9–12.5)
PROTHROMBIN TIME: 12 SEC (ref 9–12.5)
PROTHROMBIN TIME: 12.9 SEC (ref 9–12.5)
RBC # BLD AUTO: 3.48 M/UL (ref 4.6–6.2)
SODIUM SERPL-SCNC: 137 MMOL/L (ref 136–145)
WBC # BLD AUTO: 6.65 K/UL (ref 3.9–12.7)

## 2019-07-22 PROCEDURE — 27000248 HC VAD-ADDITIONAL DAY

## 2019-07-22 PROCEDURE — 25000003 PHARM REV CODE 250: Performed by: STUDENT IN AN ORGANIZED HEALTH CARE EDUCATION/TRAINING PROGRAM

## 2019-07-22 PROCEDURE — 25000003 PHARM REV CODE 250: Performed by: INTERNAL MEDICINE

## 2019-07-22 PROCEDURE — 83735 ASSAY OF MAGNESIUM: CPT

## 2019-07-22 PROCEDURE — 36415 COLL VENOUS BLD VENIPUNCTURE: CPT

## 2019-07-22 PROCEDURE — 20600001 HC STEP DOWN PRIVATE ROOM

## 2019-07-22 PROCEDURE — 63600175 PHARM REV CODE 636 W HCPCS: Performed by: PHYSICIAN ASSISTANT

## 2019-07-22 PROCEDURE — 85610 PROTHROMBIN TIME: CPT | Mod: 91

## 2019-07-22 PROCEDURE — 86141 C-REACTIVE PROTEIN HS: CPT

## 2019-07-22 PROCEDURE — 85730 THROMBOPLASTIN TIME PARTIAL: CPT | Mod: 91

## 2019-07-22 PROCEDURE — 84134 ASSAY OF PREALBUMIN: CPT

## 2019-07-22 PROCEDURE — 85610 PROTHROMBIN TIME: CPT

## 2019-07-22 PROCEDURE — 99233 SBSQ HOSP IP/OBS HIGH 50: CPT | Mod: ,,, | Performed by: INTERNAL MEDICINE

## 2019-07-22 PROCEDURE — 85730 THROMBOPLASTIN TIME PARTIAL: CPT

## 2019-07-22 PROCEDURE — C9113 INJ PANTOPRAZOLE SODIUM, VIA: HCPCS | Performed by: PHYSICIAN ASSISTANT

## 2019-07-22 PROCEDURE — 80076 HEPATIC FUNCTION PANEL: CPT

## 2019-07-22 PROCEDURE — 83880 ASSAY OF NATRIURETIC PEPTIDE: CPT

## 2019-07-22 PROCEDURE — 93750 INTERROGATION VAD IN PERSON: CPT | Mod: ,,, | Performed by: INTERNAL MEDICINE

## 2019-07-22 PROCEDURE — 25000003 PHARM REV CODE 250: Performed by: NURSE PRACTITIONER

## 2019-07-22 PROCEDURE — 84100 ASSAY OF PHOSPHORUS: CPT

## 2019-07-22 PROCEDURE — 93750 PR INTERROGATE VENT ASSIST DEV, IN PERSON, W PHYSICIAN ANALYSIS: ICD-10-PCS | Mod: ,,, | Performed by: INTERNAL MEDICINE

## 2019-07-22 PROCEDURE — 25000003 PHARM REV CODE 250: Performed by: PHYSICIAN ASSISTANT

## 2019-07-22 PROCEDURE — 80048 BASIC METABOLIC PNL TOTAL CA: CPT

## 2019-07-22 PROCEDURE — 99233 PR SUBSEQUENT HOSPITAL CARE,LEVL III: ICD-10-PCS | Mod: ,,, | Performed by: INTERNAL MEDICINE

## 2019-07-22 PROCEDURE — 85025 COMPLETE CBC W/AUTO DIFF WBC: CPT

## 2019-07-22 PROCEDURE — 83615 LACTATE (LD) (LDH) ENZYME: CPT

## 2019-07-22 RX ORDER — PANTOPRAZOLE SODIUM 40 MG/1
40 TABLET, DELAYED RELEASE ORAL
Status: DISCONTINUED | OUTPATIENT
Start: 2019-07-22 | End: 2019-07-23 | Stop reason: HOSPADM

## 2019-07-22 RX ORDER — PANTOPRAZOLE SODIUM 40 MG/1
40 TABLET, DELAYED RELEASE ORAL DAILY
Status: DISCONTINUED | OUTPATIENT
Start: 2019-07-23 | End: 2019-07-22

## 2019-07-22 RX ORDER — HEPARIN SODIUM,PORCINE/D5W 25000/250
8 INTRAVENOUS SOLUTION INTRAVENOUS CONTINUOUS
Status: DISCONTINUED | OUTPATIENT
Start: 2019-07-22 | End: 2019-07-23 | Stop reason: HOSPADM

## 2019-07-22 RX ADMIN — PANTOPRAZOLE SODIUM 40 MG: 40 TABLET, DELAYED RELEASE ORAL at 05:07

## 2019-07-22 RX ADMIN — ALLOPURINOL 100 MG: 100 TABLET ORAL at 09:07

## 2019-07-22 RX ADMIN — MESALAMINE 1000 MG: 250 CAPSULE ORAL at 09:07

## 2019-07-22 RX ADMIN — BUMETANIDE 2 MG: 1 TABLET ORAL at 09:07

## 2019-07-22 RX ADMIN — POTASSIUM CHLORIDE 20 MEQ: 1500 TABLET, EXTENDED RELEASE ORAL at 09:07

## 2019-07-22 RX ADMIN — MESALAMINE 1000 MG: 250 CAPSULE ORAL at 08:07

## 2019-07-22 RX ADMIN — PANTOPRAZOLE SODIUM 40 MG: 40 INJECTION, POWDER, FOR SOLUTION INTRAVENOUS at 09:07

## 2019-07-22 RX ADMIN — MAGNESIUM OXIDE TAB 400 MG (241.3 MG ELEMENTAL MG) 400 MG: 400 (241.3 MG) TAB at 08:07

## 2019-07-22 RX ADMIN — MESALAMINE 1000 MG: 250 CAPSULE ORAL at 05:07

## 2019-07-22 RX ADMIN — MESALAMINE 1000 MG: 250 CAPSULE ORAL at 02:07

## 2019-07-22 RX ADMIN — AMIODARONE HYDROCHLORIDE 200 MG: 200 TABLET ORAL at 09:07

## 2019-07-22 RX ADMIN — WARFARIN SODIUM 10 MG: 5 TABLET ORAL at 05:07

## 2019-07-22 RX ADMIN — MAGNESIUM OXIDE TAB 400 MG (241.3 MG ELEMENTAL MG) 400 MG: 400 (241.3 MG) TAB at 02:07

## 2019-07-22 RX ADMIN — MAGNESIUM OXIDE TAB 400 MG (241.3 MG ELEMENTAL MG) 400 MG: 400 (241.3 MG) TAB at 09:07

## 2019-07-22 RX ADMIN — FERROUS GLUCONATE TAB 324 MG (37.5 MG ELEMENTAL IRON) 324 MG: 324 (37.5 FE) TAB at 09:07

## 2019-07-22 RX ADMIN — HEPARIN SODIUM AND DEXTROSE 8 UNITS/KG/HR: 10000; 5 INJECTION INTRAVENOUS at 03:07

## 2019-07-22 NOTE — PLAN OF CARE
Problem: Adult Inpatient Plan of Care  Goal: Plan of Care Review  Outcome: Ongoing (interventions implemented as appropriate)  Plan of care discussed with patient.  Patient ambulating independently, fall precautions in place. LVAD DP and numbers WNL, smooth LVAD hum. Patient has no complaints of pain. Discussed medications and care. INR resulted at 1.2 this am. Warfarin to be adjusted accordingly. Heparin drip started, currently running at 8 mg/kg/hr (7.5 mL/hr). Next PTT to be drawn per protocol. Patient has no questions at this time. Will continue to monitor.

## 2019-07-22 NOTE — ASSESSMENT & PLAN NOTE
-Heartmate 2, implanted 3/8/18 as DT (Dr. Kaufman)  -INR sub therapeutic today,with goal 2-3. INR 1.2, coumadin 10 mg today  -Hold ASA for now  -MAP goal 60-80,  will hold anti-HTNs for now given recent massive GIB (spironolactone, doxazosin, amlodipine, hydralazine)  -Resumed Bumex last week 2 mg qd,   -TTE 7/17 at 9800 rpm: LVEDD 7.23, LVEF 13%, diastolic dysfunction, RV mildly dilated, normal RV function, mild MR, trace TR, IVC 3, small pericardial effusion, AV does not open, septum bows right    Procedure: Device Interrogation Including analysis of device parameters  Current Settings: Ventricular Assist Device  Review of device function is stable    TXP LVAD INTERROGATIONS 7/22/2019 7/22/2019 7/22/2019 7/22/2019 7/21/2019 7/21/2019 7/21/2019   Type HeartMate II HeartMate II HeartMate II HeartMate II HeartMate II HeartMate II HeartMate II   Flow 4.8 5.5 6.1 5.1 5.4 5.5 5.4   Speed 9800 9800 9800 9800 9790 9800 9800   PI 2.9 3.8 3.4 4.1 3.3 3.1 4.8   Power ([place]) 6.0 6.4 6.7 6.2 6.4 6.5 6.2   [facility] - 9400 - - - - -   Pulsatility Intermittent pulse Intermittent pulse - - - Intermittent pulse Intermittent pulse

## 2019-07-22 NOTE — PROGRESS NOTES
07/22/2019  Casper Harris    Current provider:  Amy Rhodes MD      I, Casper Harris, rounded on Tim Richards to ensure all mechanical assist device settings (IABP or VAD) were appropriate and all parameters were within limits.  I was able to ensure all back up equipment was present, the staff had no issues, and the Perfusion Department daily rounding was complete.    9:07 AM

## 2019-07-22 NOTE — PROGRESS NOTES
[facility]  Heart Transplant  Progress Note    Patient Name: Tim Richards  MRN: [id number]  Admission Date: 7/16/2019  Hospital Length of Stay: 6 days  Attending Physician: Amy Rhodes MD  Primary Care Provider: Ja Méndez MD  Principal Problem:GIB (gastrointestinal bleeding)    Subjective:     Interval History: Patient reports he has not had BM since Saturday.  He is eager to go home.  Plan is to discharge once INR is therapeutic.  Plan to start low dose heparin today     Continuous Infusions:   heparin (porcine) in D5W       Scheduled Meds:   allopurinol  100 mg Oral Daily    amiodarone  200 mg Oral Daily    bumetanide  2 mg Oral Daily    ferrous gluconate  324 mg Oral Daily with breakfast    magnesium oxide  400 mg Oral TID    mesalamine  1,000 mg Oral QID    [START ON 7/23/2019] pantoprazole  40 mg Oral Daily    potassium chloride SA  20 mEq Oral Daily    warfarin  10 mg Oral Daily     PRN Meds:promethazine (PHENERGAN) IVPB    Review of patient's allergies indicates:   Allergen Reactions    Lisinopril Anaphylaxis     Objective:     Vital Signs (Most Recent):  Temp: 97.6 °F (36.4 °C) (07/22/19 1147)  Pulse: 72 (07/22/19 1147)  Resp: 16 (07/22/19 1147)  BP: (!) 84/69 (07/22/19 1347)  SpO2: 97 % (07/22/19 1147) Vital Signs (24h Range):  Temp:  [97.6 °F (36.4 °C)-98.5 °F (36.9 °C)] 97.6 °F (36.4 °C)  Pulse:  [] 72  Resp:  [15-18] 16  SpO2:  [97 %-99 %] 97 %  BP: ()/(0-80) 84/69     Patient Vitals for the past 72 hrs (Last 3 readings):   Weight   07/22/19 0500 116.9 kg (257 lb 11.5 oz)   07/21/19 0500 117.6 kg (259 lb 4.2 oz)   07/20/19 0511 116.9 kg (257 lb 11.5 oz)     Body mass index is 34.95 kg/m².      Intake/Output Summary (Last 24 hours) at 7/22/2019 1432  Last data filed at 7/22/2019 0800  Gross per 24 hour   Intake 330 ml   Output 800 ml   Net -470 ml       Hemodynamic Parameters:       Telemetry: Paced    Physical Exam   Constitutional: He is  oriented to person, place, and time. He appears well-developed and well-nourished.   HENT:   Mouth/Throat: Oropharynx is clear and moist.   Neck: No JVD present.   Cardiovascular: Normal rate and regular rhythm.   Smooth VAD hum   Pulmonary/Chest: Effort normal and breath sounds normal. No respiratory distress. He has no rales.   Abdominal: Soft. Bowel sounds are normal. He exhibits no distension. There is no tenderness. There is no guarding.   Musculoskeletal: He exhibits edema (Trace).   Neurological: He is alert and oriented to person, place, and time.   Skin: Skin is warm.   Psychiatric: He has a normal mood and affect.   Nursing note and vitals reviewed.      Significant Labs:  CBC:  Recent Labs   Lab 07/20/19  0442 07/21/19  0430 07/22/19  0512   WBC 6.92 6.53 6.65   RBC 3.29* 3.47* 3.48*   HGB 9.7* 10.1* 10.2*   HCT 30.0* 31.4* 31.9*   * 156 183   MCV 91 91 92   MCH 29.5 29.1 29.3   MCHC 32.3 32.2 32.0     BNP:  Recent Labs   Lab 07/19/19  0400 07/21/19  0430 07/22/19  0512   BNP 1,016* 362* 312*     CMP:  Recent Labs   Lab 07/17/19  0414  07/19/19  0400 07/20/19  0442 07/21/19  0430 07/22/19  0817   *   < > 86 106 104 103   CALCIUM 8.2*   < > 8.3* 8.5* 9.1 9.4   ALBUMIN 2.9*  --  2.6*  --   --  3.3*   PROT 5.3*  --  5.0*  --   --  6.8      < > 134* 136 135* 137   K 5.1   < > 4.1 3.9 3.9 4.5   CO2 24   < > 25 27 26 24      < > 103 102 101 103   BUN 31*   < > 13 12 10 14   CREATININE 2.3*   < > 1.5* 1.6* 1.5* 1.6*   ALKPHOS 52*  --  52*  --   --  84   ALT 12  --  10  --   --  27   AST 24  --  24  --   --  36   BILITOT 1.6*  --  0.4  --   --  0.3    < > = values in this interval not displayed.      Coagulation:   Recent Labs   Lab 07/20/19 0442 07/21/19  0430 07/22/19  0512 07/22/19  0817   INR 1.2 1.1 1.1 1.2   APTT 25.7 25.4 26.7  --      LDH:  Recent Labs   Lab 07/20/19 0442 07/21/19  0430 07/22/19  0817    254 260     Microbiology:  Microbiology Results (last 7 days)      Procedure Component Value Units Date/Time    Blood culture [[id number]] Collected:  07/17/19 1420    Order Status:  Completed Specimen:  Blood from Peripheral, Antecubital, Left Updated:  07/21/19 1612     Blood Culture, Routine No Growth to date      No Growth to date      No Growth to date      No Growth to date      No Growth to date    Blood culture [[id number]] Collected:  07/17/19 1452    Order Status:  Completed Specimen:  Blood from Peripheral, Hand, Right Updated:  07/21/19 1612     Blood Culture, Routine No Growth to date      No Growth to date      No Growth to date      No Growth to date      No Growth to date    Stool culture [[id number]] Collected:  07/16/19 1731    Order Status:  Completed Specimen:  Stool Updated:  07/19/19 1326     Stool Culture No Salmonella,Shigella,Vibrio,Campylobacter,Yersinia isolated.    Clostridium difficile EIA [[id number]] Collected:  07/17/19 0904    Order Status:  Completed Specimen:  Stool Updated:  07/17/19 1409     C. diff Antigen Negative     C difficile Toxins A+B, EIA Negative     Comment: Testing not recommended for children <24 months old.       E. coli 0157 antigen [[id number]] Collected:  07/16/19 1731    Order Status:  Completed Specimen:  Stool Updated:  07/17/19 1046     Shiga Toxin 1 E.coli Negative     Shiga Toxin 2 E.coli Negative          BMP:   Recent Labs   Lab 07/22/19  0817         K 4.5      CO2 24   BUN 14   CREATININE 1.6*   CALCIUM 9.4   MG 2.2     Cardiac Markers: No results for input(s): CKMB, TROPONINT, MYOGLOBIN in the last 72 hours.  Coagulation:   Recent Labs   Lab 07/22/19  0512 07/22/19  0817   INR 1.1 1.2   APTT 26.7  --      I have reviewed all pertinent labs within the past 24 hours.    Estimated Creatinine Clearance: 71.3 mL/min (A) (based on SCr of 1.6 mg/dL (H)).    Diagnostic Results:  I have reviewed all pertinent imaging results/findings within the past 24 hours.    Assessment and Plan:     53 M with hx of DCM, s/p  HM2 3/8/18 presents to ER with BRBPR and LH/dizziness. Pt says he was feeling fine until yesterday afternoon after lunch. He says his stomach started grumbling a lot and then he started feeling LH/dizzy with standing. He started having bloody diarrhea around 6 pm last night. Last episode 11:30 this am. Having numbness in lips and hands at times. C/o LLQ abd pain. No N/V.     In ER, Hgb was found to be 7.9, down from 14.5 2 weeks ago      * GIB (gastrointestinal bleeding)  -Acute GIB with hematochezia  -Hgb 7.9 on admit, down from 14.5 ~ 2 weeks ago  -Transfused 10 units PRBCs total since admission without appropriate response in H&H. Hgb 9.2 -> 9.3. INR 1.2, coumadin 10 mg today.  -EGD unremarkable. C-scope showed non bleeding ulcer transverse colon and cecal ulcer with large clot on it  -Mesalamine started  at GI's request  -GI OK'd to restart coumadin.  -Did not bridge secondary to GI bleed but will start low dose heparin today and monitor response.  -Mesenteric US attempted 7/18 to R/O ischemia as cause for ulcers, but it was technically impossible d/t body habitus and presence of LVAD  -CT abdomen 7/16/19- no colitis  -C diff -ve  -Was on Protonix 80 IV BID, changed to po today  -Hold antiHTNs in the setting of recent massive GI bleed  -Transfer to step down        LVAD (left ventricular assist device) present  -Heartmate 2, implanted 3/8/18 as DT (Dr. Kaufman)  -INR sub therapeutic today,with goal 2-3. INR 1.2, coumadin 10 mg today  -Hold ASA for now  -MAP goal 60-80,  will hold anti-HTNs for now given recent massive GIB (spironolactone, doxazosin, amlodipine, hydralazine)  -Resumed Bumex last week 2 mg qd,   -TTE 7/17 at 9800 rpm: LVEDD 7.23, LVEF 13%, diastolic dysfunction, RV mildly dilated, normal RV function, mild MR, trace TR, IVC 3, small pericardial effusion, AV does not open, septum bows right    Procedure: Device Interrogation Including analysis of device parameters  Current Settings: Ventricular  Assist Device  Review of device function is stable    TXP LVAD INTERROGATIONS 7/22/2019 7/22/2019 7/22/2019 7/22/2019 7/21/2019 7/21/2019 7/21/2019   Type HeartMate II HeartMate II HeartMate II HeartMate II HeartMate II HeartMate II HeartMate II   Flow 4.8 5.5 6.1 5.1 5.4 5.5 5.4   Speed 9800 9800 9800 9800 9790 9800 9800   PI 2.9 3.8 3.4 4.1 3.3 3.1 4.8   Power ([place]) 6.0 6.4 6.7 6.2 6.4 6.5 6.2   LSL - 9400 - - - - -   Pulsatility Intermittent pulse Intermittent pulse - - - Intermittent pulse Intermittent pulse       CKD (chronic kidney disease), stage III  - Baseline creatinine ~ 1.7-2.1,    PVT (paroxysmal ventricular tachycardia)  -Cont Amio  -ICD in place    Anemia  -Acute anemia, due to GIB. 10.2 now  -Hgb 7.9 on admit, down from 14.5 ~ 2 weeks ago  -See GIB    Smoker  - Continues to smoke occasionally    Thrombocytopenia, unspecified  - Platelet count on admit WNL, and was trending down, but is coming back up to 109K today  - Appreciate Hematology's help - anemia and thrombocytopenia due to GI bleed from colonic ulcer confirmed on endoscopy    Leukocytosis  -C diff -ve  -Stool culture in progress  -Blood cultures with NGTD  - WCC has now normalized. Leukocytosis likely 2/2 active GI bleed (see thrombocytopenia)      YANET Coleman  Heart Transplant  [facility]

## 2019-07-22 NOTE — PLAN OF CARE
Problem: Adult Inpatient Plan of Care  Goal: Plan of Care Review  Outcome: Ongoing (interventions implemented as appropriate)  Pt A&O x4. Pt free from fall, injury, and trauma throughout shift. VSS. No c/o chest pain or SOB. LVAD dopplers and numbers WDL. Fall risk precautions reviewed and maintained. Pt verbalized understanding. All questions encouraged and answered. Will continue to monitor.

## 2019-07-22 NOTE — ASSESSMENT & PLAN NOTE
-Acute GIB with hematochezia  -Hgb 7.9 on admit, down from 14.5 ~ 2 weeks ago  -Transfused 10 units PRBCs total since admission without appropriate response in H&H. Hgb 9.2 -> 9.3. INR 1.2, coumadin 10 mg today.  -EGD unremarkable. C-scope showed non bleeding ulcer transverse colon and cecal ulcer with large clot on it  -Mesalamine started  at GI's request  -GI OK'd to restart coumadin.  -Did not bridge secondary to GI bleed but will start low dose heparin today and monitor response.  -Mesenteric US attempted 7/18 to R/O ischemia as cause for ulcers, but it was technically impossible d/t body habitus and presence of LVAD  -CT abdomen 7/16/19- no colitis  -C diff -ve  -Was on Protonix 80 IV BID, changed to po today  -Hold antiHTNs in the setting of recent massive GI bleed  -Transfer to step down

## 2019-07-22 NOTE — SUBJECTIVE & OBJECTIVE
Interval History: Patient reports he has not had BM since Saturday.  He is eager to go home.  Plan is to discharge once INR is therapeutic.  Plan to start low dose heparin today     Continuous Infusions:   heparin (porcine) in D5W       Scheduled Meds:   allopurinol  100 mg Oral Daily    amiodarone  200 mg Oral Daily    bumetanide  2 mg Oral Daily    ferrous gluconate  324 mg Oral Daily with breakfast    magnesium oxide  400 mg Oral TID    mesalamine  1,000 mg Oral QID    [START ON 7/23/2019] pantoprazole  40 mg Oral Daily    potassium chloride SA  20 mEq Oral Daily    warfarin  10 mg Oral Daily     PRN Meds:promethazine (PHENERGAN) IVPB    Review of patient's allergies indicates:   Allergen Reactions    Lisinopril Anaphylaxis     Objective:     Vital Signs (Most Recent):  Temp: 97.6 °F (36.4 °C) (07/22/19 1147)  Pulse: 72 (07/22/19 1147)  Resp: 16 (07/22/19 1147)  BP: (!) 84/69 (07/22/19 1347)  SpO2: 97 % (07/22/19 1147) Vital Signs (24h Range):  Temp:  [97.6 °F (36.4 °C)-98.5 °F (36.9 °C)] 97.6 °F (36.4 °C)  Pulse:  [] 72  Resp:  [15-18] 16  SpO2:  [97 %-99 %] 97 %  BP: ()/(0-80) 84/69     Patient Vitals for the past 72 hrs (Last 3 readings):   Weight   07/22/19 0500 116.9 kg (257 lb 11.5 oz)   07/21/19 0500 117.6 kg (259 lb 4.2 oz)   07/20/19 0511 116.9 kg (257 lb 11.5 oz)     Body mass index is 34.95 kg/m².      Intake/Output Summary (Last 24 hours) at 7/22/2019 1432  Last data filed at 7/22/2019 0800  Gross per 24 hour   Intake 330 ml   Output 800 ml   Net -470 ml       Hemodynamic Parameters:       Telemetry: Paced    Physical Exam   Constitutional: He is oriented to person, place, and time. He appears well-developed and well-nourished.   HENT:   Mouth/Throat: Oropharynx is clear and moist.   Neck: No JVD present.   Cardiovascular: Normal rate and regular rhythm.   Smooth VAD hum   Pulmonary/Chest: Effort normal and breath sounds normal. No respiratory distress. He has no rales.    Abdominal: Soft. Bowel sounds are normal. He exhibits no distension. There is no tenderness. There is no guarding.   Musculoskeletal: He exhibits edema (Trace).   Neurological: He is alert and oriented to person, place, and time.   Skin: Skin is warm.   Psychiatric: He has a normal mood and affect.   Nursing note and vitals reviewed.      Significant Labs:  CBC:  Recent Labs   Lab 07/20/19 0442 07/21/19 0430 07/22/19  0512   WBC 6.92 6.53 6.65   RBC 3.29* 3.47* 3.48*   HGB 9.7* 10.1* 10.2*   HCT 30.0* 31.4* 31.9*   * 156 183   MCV 91 91 92   MCH 29.5 29.1 29.3   MCHC 32.3 32.2 32.0     BNP:  Recent Labs   Lab 07/19/19 0400 07/21/19 0430 07/22/19  0512   BNP 1,016* 362* 312*     CMP:  Recent Labs   Lab 07/17/19  0414  07/19/19 0400 07/20/19 0442 07/21/19 0430 07/22/19  0817   *   < > 86 106 104 103   CALCIUM 8.2*   < > 8.3* 8.5* 9.1 9.4   ALBUMIN 2.9*  --  2.6*  --   --  3.3*   PROT 5.3*  --  5.0*  --   --  6.8      < > 134* 136 135* 137   K 5.1   < > 4.1 3.9 3.9 4.5   CO2 24   < > 25 27 26 24      < > 103 102 101 103   BUN 31*   < > 13 12 10 14   CREATININE 2.3*   < > 1.5* 1.6* 1.5* 1.6*   ALKPHOS 52*  --  52*  --   --  84   ALT 12  --  10  --   --  27   AST 24  --  24  --   --  36   BILITOT 1.6*  --  0.4  --   --  0.3    < > = values in this interval not displayed.      Coagulation:   Recent Labs   Lab 07/20/19 0442 07/21/19  0430 07/22/19  0512 07/22/19  0817   INR 1.2 1.1 1.1 1.2   APTT 25.7 25.4 26.7  --      LDH:  Recent Labs   Lab 07/20/19  0442 07/21/19  0430 07/22/19  0817    254 260     Microbiology:  Microbiology Results (last 7 days)     Procedure Component Value Units Date/Time    Blood culture [[id number]] Collected:  07/17/19 1420    Order Status:  Completed Specimen:  Blood from Peripheral, Antecubital, Left Updated:  07/21/19 1612     Blood Culture, Routine No Growth to date      No Growth to date      No Growth to date      No Growth to date      No Growth  to date    Blood culture [[id number]] Collected:  07/17/19 1452    Order Status:  Completed Specimen:  Blood from Peripheral, Hand, Right Updated:  07/21/19 1612     Blood Culture, Routine No Growth to date      No Growth to date      No Growth to date      No Growth to date      No Growth to date    Stool culture [[id number]] Collected:  07/16/19 1731    Order Status:  Completed Specimen:  Stool Updated:  07/19/19 1326     Stool Culture No Salmonella,Shigella,Vibrio,Campylobacter,Yersinia isolated.    Clostridium difficile EIA [[id number]] Collected:  07/17/19 0904    Order Status:  Completed Specimen:  Stool Updated:  07/17/19 1409     C. diff Antigen Negative     C difficile Toxins A+B, EIA Negative     Comment: Testing not recommended for children <24 months old.       E. coli 0157 antigen [[id number]] Collected:  07/16/19 1731    Order Status:  Completed Specimen:  Stool Updated:  07/17/19 1046     Shiga Toxin 1 E.coli Negative     Shiga Toxin 2 E.coli Negative          BMP:   Recent Labs   Lab 07/22/19  0817         K 4.5      CO2 24   BUN 14   CREATININE 1.6*   CALCIUM 9.4   MG 2.2     Cardiac Markers: No results for input(s): CKMB, TROPONINT, MYOGLOBIN in the last 72 hours.  Coagulation:   Recent Labs   Lab 07/22/19  0512 07/22/19  0817   INR 1.1 1.2   APTT 26.7  --      I have reviewed all pertinent labs within the past 24 hours.    Estimated Creatinine Clearance: 71.3 mL/min (A) (based on SCr of 1.6 mg/dL (H)).    Diagnostic Results:  I have reviewed all pertinent imaging results/findings within the past 24 hours.

## 2019-07-23 ENCOUNTER — PATIENT MESSAGE (OUTPATIENT)
Dept: TRANSPLANT | Facility: CLINIC | Age: 54
End: 2019-07-23

## 2019-07-23 VITALS
HEART RATE: 72 BPM | OXYGEN SATURATION: 99 % | RESPIRATION RATE: 16 BRPM | HEIGHT: 72 IN | BODY MASS INDEX: 34.98 KG/M2 | TEMPERATURE: 97 F | SYSTOLIC BLOOD PRESSURE: 76 MMHG | WEIGHT: 258.25 LBS

## 2019-07-23 LAB
ANION GAP SERPL CALC-SCNC: 8 MMOL/L (ref 8–16)
APTT BLDCRRT: 33.3 SEC (ref 21–32)
APTT BLDCRRT: 33.3 SEC (ref 21–32)
APTT BLDCRRT: 35 SEC (ref 21–32)
BASOPHILS # BLD AUTO: 0.02 K/UL (ref 0–0.2)
BASOPHILS NFR BLD: 0.3 % (ref 0–1.9)
BUN SERPL-MCNC: 14 MG/DL (ref 6–20)
CALCIUM SERPL-MCNC: 9.2 MG/DL (ref 8.7–10.5)
CHLORIDE SERPL-SCNC: 103 MMOL/L (ref 95–110)
CO2 SERPL-SCNC: 27 MMOL/L (ref 23–29)
CREAT SERPL-MCNC: 1.4 MG/DL (ref 0.5–1.4)
DIFFERENTIAL METHOD: ABNORMAL
EOSINOPHIL # BLD AUTO: 0 K/UL (ref 0–0.5)
EOSINOPHIL NFR BLD: 0 % (ref 0–8)
ERYTHROCYTE [DISTWIDTH] IN BLOOD BY AUTOMATED COUNT: 15.8 % (ref 11.5–14.5)
EST. GFR  (AFRICAN AMERICAN): >60 ML/MIN/1.73 M^2
EST. GFR  (NON AFRICAN AMERICAN): 57.4 ML/MIN/1.73 M^2
GLUCOSE SERPL-MCNC: 89 MG/DL (ref 70–110)
HCT VFR BLD AUTO: 31.3 % (ref 40–54)
HGB BLD-MCNC: 9.9 G/DL (ref 14–18)
IMM GRANULOCYTES # BLD AUTO: 0.03 K/UL (ref 0–0.04)
IMM GRANULOCYTES NFR BLD AUTO: 0.5 % (ref 0–0.5)
INR PPP: 1.5 (ref 0.8–1.2)
LDH SERPL L TO P-CCNC: 246 U/L (ref 110–260)
LYMPHOCYTES # BLD AUTO: 1 K/UL (ref 1–4.8)
LYMPHOCYTES NFR BLD: 16.9 % (ref 18–48)
MAGNESIUM SERPL-MCNC: 2.2 MG/DL (ref 1.6–2.6)
MCH RBC QN AUTO: 29.6 PG (ref 27–31)
MCHC RBC AUTO-ENTMCNC: 31.6 G/DL (ref 32–36)
MCV RBC AUTO: 93 FL (ref 82–98)
MONOCYTES # BLD AUTO: 0.8 K/UL (ref 0.3–1)
MONOCYTES NFR BLD: 13.8 % (ref 4–15)
NEUTROPHILS # BLD AUTO: 3.9 K/UL (ref 1.8–7.7)
NEUTROPHILS NFR BLD: 68.5 % (ref 38–73)
NRBC BLD-RTO: 0 /100 WBC
PHOSPHATE SERPL-MCNC: 3.6 MG/DL (ref 2.7–4.5)
PLATELET # BLD AUTO: 184 K/UL (ref 150–350)
PMV BLD AUTO: 10.4 FL (ref 9.2–12.9)
POTASSIUM SERPL-SCNC: 4.2 MMOL/L (ref 3.5–5.1)
PROTHROMBIN TIME: 14.8 SEC (ref 9–12.5)
RBC # BLD AUTO: 3.35 M/UL (ref 4.6–6.2)
SODIUM SERPL-SCNC: 138 MMOL/L (ref 136–145)
WBC # BLD AUTO: 5.73 K/UL (ref 3.9–12.7)

## 2019-07-23 PROCEDURE — 99238 HOSP IP/OBS DSCHRG MGMT 30/<: CPT | Mod: ,,, | Performed by: INTERNAL MEDICINE

## 2019-07-23 PROCEDURE — 63600175 PHARM REV CODE 636 W HCPCS: Performed by: PHYSICIAN ASSISTANT

## 2019-07-23 PROCEDURE — 85610 PROTHROMBIN TIME: CPT

## 2019-07-23 PROCEDURE — 25000003 PHARM REV CODE 250: Performed by: STUDENT IN AN ORGANIZED HEALTH CARE EDUCATION/TRAINING PROGRAM

## 2019-07-23 PROCEDURE — 93750 PR INTERROGATE VENT ASSIST DEV, IN PERSON, W PHYSICIAN ANALYSIS: ICD-10-PCS | Mod: ,,, | Performed by: INTERNAL MEDICINE

## 2019-07-23 PROCEDURE — 36415 COLL VENOUS BLD VENIPUNCTURE: CPT

## 2019-07-23 PROCEDURE — 83735 ASSAY OF MAGNESIUM: CPT

## 2019-07-23 PROCEDURE — 25000003 PHARM REV CODE 250: Performed by: NURSE PRACTITIONER

## 2019-07-23 PROCEDURE — 85025 COMPLETE CBC W/AUTO DIFF WBC: CPT

## 2019-07-23 PROCEDURE — 25000003 PHARM REV CODE 250: Performed by: PHYSICIAN ASSISTANT

## 2019-07-23 PROCEDURE — 80048 BASIC METABOLIC PNL TOTAL CA: CPT

## 2019-07-23 PROCEDURE — 84100 ASSAY OF PHOSPHORUS: CPT

## 2019-07-23 PROCEDURE — 25000003 PHARM REV CODE 250: Performed by: INTERNAL MEDICINE

## 2019-07-23 PROCEDURE — 85730 THROMBOPLASTIN TIME PARTIAL: CPT

## 2019-07-23 PROCEDURE — 93750 INTERROGATION VAD IN PERSON: CPT | Mod: ,,, | Performed by: INTERNAL MEDICINE

## 2019-07-23 PROCEDURE — 99233 PR SUBSEQUENT HOSPITAL CARE,LEVL III: ICD-10-PCS | Mod: ,,, | Performed by: INTERNAL MEDICINE

## 2019-07-23 PROCEDURE — 99238 PR HOSPITAL DISCHARGE DAY,<30 MIN: ICD-10-PCS | Mod: ,,, | Performed by: INTERNAL MEDICINE

## 2019-07-23 PROCEDURE — 85730 THROMBOPLASTIN TIME PARTIAL: CPT | Mod: 91

## 2019-07-23 PROCEDURE — 27000248 HC VAD-ADDITIONAL DAY

## 2019-07-23 PROCEDURE — 83615 LACTATE (LD) (LDH) ENZYME: CPT

## 2019-07-23 PROCEDURE — 99233 SBSQ HOSP IP/OBS HIGH 50: CPT | Mod: ,,, | Performed by: INTERNAL MEDICINE

## 2019-07-23 RX ORDER — AMLODIPINE BESYLATE 5 MG/1
5 TABLET ORAL DAILY
Qty: 30 TABLET | Refills: 11 | Status: SHIPPED | OUTPATIENT
Start: 2019-07-23 | End: 2019-08-28 | Stop reason: SDUPTHER

## 2019-07-23 RX ORDER — WARFARIN SODIUM 5 MG/1
TABLET ORAL
Qty: 45 TABLET | Refills: 11 | Status: ON HOLD
Start: 2019-07-23 | End: 2020-01-20 | Stop reason: HOSPADM

## 2019-07-23 RX ORDER — AMLODIPINE BESYLATE 5 MG/1
5 TABLET ORAL DAILY
Status: DISCONTINUED | OUTPATIENT
Start: 2019-07-23 | End: 2019-07-23 | Stop reason: HOSPADM

## 2019-07-23 RX ADMIN — AMIODARONE HYDROCHLORIDE 200 MG: 200 TABLET ORAL at 08:07

## 2019-07-23 RX ADMIN — HEPARIN SODIUM AND DEXTROSE 11 UNITS/KG/HR: 10000; 5 INJECTION INTRAVENOUS at 05:07

## 2019-07-23 RX ADMIN — AMLODIPINE BESYLATE 5 MG: 5 TABLET ORAL at 08:07

## 2019-07-23 RX ADMIN — POTASSIUM CHLORIDE 20 MEQ: 1500 TABLET, EXTENDED RELEASE ORAL at 08:07

## 2019-07-23 RX ADMIN — PANTOPRAZOLE SODIUM 40 MG: 40 TABLET, DELAYED RELEASE ORAL at 05:07

## 2019-07-23 RX ADMIN — MAGNESIUM OXIDE TAB 400 MG (241.3 MG ELEMENTAL MG) 400 MG: 400 (241.3 MG) TAB at 08:07

## 2019-07-23 RX ADMIN — BUMETANIDE 2 MG: 1 TABLET ORAL at 08:07

## 2019-07-23 RX ADMIN — MESALAMINE 1000 MG: 250 CAPSULE ORAL at 12:07

## 2019-07-23 RX ADMIN — MESALAMINE 1000 MG: 250 CAPSULE ORAL at 08:07

## 2019-07-23 RX ADMIN — ALLOPURINOL 100 MG: 100 TABLET ORAL at 08:07

## 2019-07-23 RX ADMIN — FERROUS GLUCONATE TAB 324 MG (37.5 MG ELEMENTAL IRON) 324 MG: 324 (37.5 FE) TAB at 08:07

## 2019-07-23 NOTE — SUBJECTIVE & OBJECTIVE
**Interval History: INR is now 1.5, and Dr. Rhodes has OK'd patient to be discharged today with no Lovenox and with repeat labs this Thursday in [place]. He feels well, and LDH is stable at 246.    Continuous Infusions:   heparin (porcine) in D5W 11 Units/kg/hr (07/23/19 0554)     Scheduled Meds:   allopurinol  100 mg Oral Daily    amiodarone  200 mg Oral Daily    amLODIPine  5 mg Oral Daily    bumetanide  2 mg Oral Daily    ferrous gluconate  324 mg Oral Daily with breakfast    magnesium oxide  400 mg Oral TID    mesalamine  1,000 mg Oral QID    pantoprazole  40 mg Oral BID AC    potassium chloride SA  20 mEq Oral Daily    warfarin  10 mg Oral Daily     PRN Meds:promethazine (PHENERGAN) IVPB    Review of patient's allergies indicates:   Allergen Reactions    Lisinopril Anaphylaxis     Objective:     Vital Signs (Most Recent):  Temp: 98.7 °F (37.1 °C) (07/23/19 0720)  Pulse: 72 (07/23/19 1109)  Resp: 16 (07/23/19 0720)  BP: (!) 76/0 (07/23/19 0720)  SpO2: 97 % (07/23/19 0720) Vital Signs (24h Range):  Temp:  [97.8 °F (36.6 °C)-99 °F (37.2 °C)] 98.7 °F (37.1 °C)  Pulse:  [62-76] 72  Resp:  [16-17] 16  SpO2:  [94 %-99 %] 97 %  BP: (76-84)/(0-69) 76/0     Patient Vitals for the past 72 hrs (Last 3 readings):   Weight   07/23/19 0400 117.1 kg (258 lb 4.3 oz)   07/22/19 0500 116.9 kg (257 lb 11.5 oz)   07/21/19 0500 117.6 kg (259 lb 4.2 oz)     Body mass index is 35.03 kg/m².      Intake/Output Summary (Last 24 hours) at 7/23/2019 1249  Last data filed at 7/23/2019 0800  Gross per 24 hour   Intake 720 ml   Output 1580 ml   Net -860 ml       Hemodynamic Parameters:       Telemetry: BiV paced    Physical Exam   Constitutional: He is oriented to person, place, and time. He appears well-developed and well-nourished.   HENT:   Head: Normocephalic and atraumatic.   Eyes: Pupils are equal, round, and reactive to light. Conjunctivae and EOM are normal.   Neck: Normal range of motion. Neck supple. No JVD present. No  thyromegaly present.   Cardiovascular: Normal rate and regular rhythm.   Smooth VAD hum, intermittently pulsatile   Pulmonary/Chest: Effort normal and breath sounds normal.   Abdominal: Soft. Bowel sounds are normal.   Musculoskeletal: Normal range of motion. He exhibits no edema.   Neurological: He is alert and oriented to person, place, and time.   Skin: Skin is warm and dry. Capillary refill takes 2 to 3 seconds.   Psychiatric: He has a normal mood and affect. His behavior is normal. Judgment and thought content normal.       Significant Labs:  CBC:  Recent Labs   Lab 07/21/19  0430 07/22/19  0512 07/23/19  0429   WBC 6.53 6.65 5.73   RBC 3.47* 3.48* 3.35*   HGB 10.1* 10.2* 9.9*   HCT 31.4* 31.9* 31.3*    183 184   MCV 91 92 93   MCH 29.1 29.3 29.6   MCHC 32.2 32.0 31.6*     BNP:  Recent Labs   Lab 07/19/19  0400 07/21/19  0430 07/22/19  0512   BNP 1,016* 362* 312*     CMP:  Recent Labs   Lab 07/17/19  0414  07/19/19  0400  07/21/19  0430 07/22/19  0817 07/23/19  0429   *   < > 86   < > 104 103 89   CALCIUM 8.2*   < > 8.3*   < > 9.1 9.4 9.2   ALBUMIN 2.9*  --  2.6*  --   --  3.3*  --    PROT 5.3*  --  5.0*  --   --  6.8  --       < > 134*   < > 135* 137 138   K 5.1   < > 4.1   < > 3.9 4.5 4.2   CO2 24   < > 25   < > 26 24 27      < > 103   < > 101 103 103   BUN 31*   < > 13   < > 10 14 14   CREATININE 2.3*   < > 1.5*   < > 1.5* 1.6* 1.4   ALKPHOS 52*  --  52*  --   --  84  --    ALT 12  --  10  --   --  27  --    AST 24  --  24  --   --  36  --    BILITOT 1.6*  --  0.4  --   --  0.3  --     < > = values in this interval not displayed.      Coagulation:   Recent Labs   Lab 07/22/19  0817 07/22/19  1501 07/22/19  2120 07/23/19  0430 07/23/19  1157   INR 1.2 1.3*  --  1.5*  --    APTT  --  26.9 31.0 33.3*  33.3* 35.0*     LDH:  Recent Labs   Lab 07/21/19  0430 07/22/19  0817 07/23/19  0430    260 246     Microbiology:  Microbiology Results (last 7 days)     Procedure Component Value  Units Date/Time    Blood culture [[id number]] Collected:  07/17/19 1420    Order Status:  Completed Specimen:  Blood from Peripheral, Antecubital, Left Updated:  07/22/19 1612     Blood Culture, Routine No growth after 5 days.    Blood culture [[id number]] Collected:  07/17/19 1452    Order Status:  Completed Specimen:  Blood from Peripheral, Hand, Right Updated:  07/22/19 1612     Blood Culture, Routine No growth after 5 days.    Stool culture [[id number]] Collected:  07/16/19 1731    Order Status:  Completed Specimen:  Stool Updated:  07/19/19 1326     Stool Culture No Salmonella,Shigella,Vibrio,Campylobacter,Yersinia isolated.    Clostridium difficile EIA [[id number]] Collected:  07/17/19 0904    Order Status:  Completed Specimen:  Stool Updated:  07/17/19 1409     C. diff Antigen Negative     C difficile Toxins A+B, EIA Negative     Comment: Testing not recommended for children <24 months old.       E. coli 0157 antigen [[id number]] Collected:  07/16/19 1731    Order Status:  Completed Specimen:  Stool Updated:  07/17/19 1046     Shiga Toxin 1 E.coli Negative     Shiga Toxin 2 E.coli Negative          I have reviewed all pertinent labs within the past 24 hours.    Estimated Creatinine Clearance: 81.5 mL/min (based on SCr of 1.4 mg/dL).    Diagnostic Results:  I have reviewed all pertinent imaging results/findings within the past 24 hours.

## 2019-07-23 NOTE — NURSING
Patient is ready for discharge. Patient stable alert and oriented. IV removed. No complaints of pain. Discussed discharge plan. Reviewed medications and side effects, appointments, and answered questions with patient and family. RX given to patient. Two weeks of dressing kit supplies inventoried and given to pt. All VAD equipment inventoried and sent home with pt. AVS reviewed and given to pt.

## 2019-07-23 NOTE — PLAN OF CARE
Problem: Adult Inpatient Plan of Care  Goal: Plan of Care Review  Outcome: Ongoing (interventions implemented as appropriate)  Pt AAO and VSS. Pt LVAD smooth hum heard and numbers WNL. Pt on heparin gtt increased to 10units/kg/hr as heparin bridge for low INR. Will continue to monitor for signs of GI bleeding recurrence now that heparin gtt started. Pt educated on fall risk overnight,pt remained free from falls/trauma/injury. Denies chest pain, SOB, palpitations, dizziness, pain, or discomfort. Plan of care reviewed with pt, all questions answered. Bed locked in lowest position, call bell within reach, no acute distress noted, will continue to monitor.

## 2019-07-23 NOTE — ASSESSMENT & PLAN NOTE
-C diff -ve  -Stool cultures -ve  -Blood cultures with NGTD  - WCC has now normalized. Leukocytosis likely 2/2 active GI bleed (see thrombocytopenia)

## 2019-07-23 NOTE — PROGRESS NOTES
Pt aaox3 while standing up in bathroom.  LVAD history interrogated with no abnormal events noted.  LVAD numbers WNL. Pt denies any needs at this time. Pt to d/c today to go to [place] to see his new grandchild.  PT instructed to have labs done at local hospital in [place].  Also reminded pt it must be a hospital lab as [facility] take several days to result.  Pt given printed Lab Rx form to take with him for labs 7/25 with instructions on form to send labs to VAD office and coumadin clinic.  Pt also given letter of travel for flying and reminded he must elect a Pat down and avoid security scanners. Pt reminded to always page VAD coordinator on call for emergencies.  Pt also in need of soap and water dressing supplies.  Instructed nurse to please order for him.  She verbalized understanding and in agreement of plan.  Encouraged pt to notify nurse if they have any questions, problems or concerns for LVAD coordinator.  Pt verbalized understanding and in agreement of plan. Will follow up with pt soon.

## 2019-07-23 NOTE — PROGRESS NOTES
DISCHARGE NOTE:    Tim Richards is a 53 y.o. male s/p HM2 LVAD 3/2018 who will be discharged today following admission for anemia and hematochezia.     Past Medical History:   Diagnosis Date    CHF (congestive heart failure)     Dilated cardiomyopathy 1/10/2018    Disorder of kidney and ureter     CKD    Gout     HTN (hypertension)     Ventricular tachycardia (paroxysmal)        Hospital Course: C scope revealed ulcers in the transverse colon and cecum. Mesalamine was initiated and aspirin was discontinued. The patient was transfused 10 units of pRBCs.     Allergies:   Review of patient's allergies indicates:   Allergen Reactions    Lisinopril Anaphylaxis       Pharmacy Interventions/Recommendations:  1) INR Goal: 2-3    2) Antiplatelet Agents: none - stopped    3) Heparin Bridging:  Yes, but physician discretion given recent bleed.     4) Patient Counseling/Education:  Provided at bedside    5) INR Follow-Up/Discharge Needs:  Will gently boost patient with 7.5mg today, followed by his usual home dose. Repeat INR Thursday.     See list of discharge medication for dosing instructions.     Tim Richards and his caregiver verbalized their understanding and had the opportunity to ask questions.      Discharge Medications:   Tim Richards   Home Medication Instructions TRE:[id number]    Printed on:07/23/19 1054   Medication Information                      allopurinol (ZYLOPRIM) 100 MG tablet  TAKE 1 TABLET BY MOUTH EVERY DAY             amiodarone (PACERONE) 200 MG Tab  TAKE 1 TABLET (200 MG TOTAL) BY MOUTH ONCE DAILY.             amLODIPine (NORVASC) 5 MG tablet  Take 1 tablet (5 mg total) by mouth once daily.             bumetanide (BUMEX) 2 MG tablet  Take 1 tablet (2 mg total) by mouth once daily.             ferrous gluconate (FERGON) 324 MG tablet  Take 1 tablet (324 mg total) by mouth daily with breakfast.             magnesium oxide (MAG-OX) 400 mg (241.3 mg magnesium) tablet  TAKE 1  TABLET (400 MG TOTAL) BY MOUTH 3 (THREE) TIMES DAILY.             mesalamine (PENTASA) 250 mg CpSR  Take 4 capsules (1,000 mg total) by mouth 4 (four) times daily.             pantoprazole (PROTONIX) 40 MG tablet  TAKE 1 TABLET (40 MG TOTAL) BY MOUTH ONCE DAILY.             potassium chloride (K-TAB) 20 mEq  Take 1 tablet (20 mEq total) by mouth 2 (two) times daily.             sildenafil (VIAGRA) 100 MG tablet  Take 1 tablet (100 mg total) by mouth daily as needed for Erectile Dysfunction.             warfarin (COUMADIN) 5 MG tablet  Take 1.5 tabs by mouth on 7/23 only, followed by weekly dose of 1 tablet daily, except 1.5 tabs on Mon, Wed, Fri

## 2019-07-23 NOTE — ASSESSMENT & PLAN NOTE
- Platelet count on admit WNL, and was trending down, but is coming back up to 184K today  - Appreciate Hematology's help - anemia and thrombocytopenia due to GI bleed from colonic ulcer confirmed on endoscopy

## 2019-07-23 NOTE — ASSESSMENT & PLAN NOTE
-Acute GIB with hematochezia  -Hgb 7.9 on admit, down from 14.5 ~ 2 weeks ago  -Transfused 10 units PRBCs total since admission without appropriate response in H&H. Hgb stable at 9.9. INR 1.5, boost Coumadin  -EGD unremarkable. C-scope showed non bleeding ulcer transverse colon and cecal ulcer with large clot on it  -Mesalamine started  at GI's request  -GI OK'd to restart coumadin.  -Continue minimal intensity Heparin bridge until D/C'd today.   -Mesenteric US attempted 7/18 to R/O ischemia as cause for ulcers, but it was technically impossible d/t body habitus and presence of LVAD  -CT abdomen 7/16/19- no colitis  -C diff -ve  -Was on Protonix 80 IV BID, changed to po   -Held antiHTNs in the setting of recent massive GI bleed. Resumed Norvasc 5 mg qd today  -D/C home today on no Lovenox bridge, but will have full labs checked Thursday in [place], and have him f/u in VAD clinic in 3-4 weeks.

## 2019-07-23 NOTE — PROGRESS NOTES
Tim Richards is a 53 y.o. male s/p HM2 LVAD 3/2018 who will be discharged today following admission for anemia and hematochezia.C scope revealed ulcers in the transverse colon and cecum. Mesalamine was initiated and aspirin was discontinued. The patient was transfused 10 units of pRBCs. Will gently boost patient with 7.5mg today, followed by his usual home dose. Repeat INR Thursday.

## 2019-07-23 NOTE — PROGRESS NOTES
[facility]  Heart Transplant  Progress Note    Patient Name: Tim Richards  MRN: [id number]  Admission Date: 7/16/2019  Hospital Length of Stay: 7 days  Attending Physician: Amy Rhodes MD  Primary Care Provider: Ja Méndez MD  Principal Problem:GIB (gastrointestinal bleeding)    Subjective:     **Interval History: INR is now 1.5, and Dr. Rhodes has OK'd patient to be discharged today with no Lovenox and with repeat labs this Thursday in [place]. He feels well, and LDH is stable at 246.    Continuous Infusions:   heparin (porcine) in D5W 11 Units/kg/hr (07/23/19 0554)     Scheduled Meds:   allopurinol  100 mg Oral Daily    amiodarone  200 mg Oral Daily    amLODIPine  5 mg Oral Daily    bumetanide  2 mg Oral Daily    ferrous gluconate  324 mg Oral Daily with breakfast    magnesium oxide  400 mg Oral TID    mesalamine  1,000 mg Oral QID    pantoprazole  40 mg Oral BID AC    potassium chloride SA  20 mEq Oral Daily    warfarin  10 mg Oral Daily     PRN Meds:promethazine (PHENERGAN) IVPB    Review of patient's allergies indicates:   Allergen Reactions    Lisinopril Anaphylaxis     Objective:     Vital Signs (Most Recent):  Temp: 98.7 °F (37.1 °C) (07/23/19 0720)  Pulse: 72 (07/23/19 1109)  Resp: 16 (07/23/19 0720)  BP: (!) 76/0 (07/23/19 0720)  SpO2: 97 % (07/23/19 0720) Vital Signs (24h Range):  Temp:  [97.8 °F (36.6 °C)-99 °F (37.2 °C)] 98.7 °F (37.1 °C)  Pulse:  [62-76] 72  Resp:  [16-17] 16  SpO2:  [94 %-99 %] 97 %  BP: (76-84)/(0-69) 76/0     Patient Vitals for the past 72 hrs (Last 3 readings):   Weight   07/23/19 0400 117.1 kg (258 lb 4.3 oz)   07/22/19 0500 116.9 kg (257 lb 11.5 oz)   07/21/19 0500 117.6 kg (259 lb 4.2 oz)     Body mass index is 35.03 kg/m².      Intake/Output Summary (Last 24 hours) at 7/23/2019 1249  Last data filed at 7/23/2019 0800  Gross per 24 hour   Intake 720 ml   Output 1580 ml   Net -860 ml       Hemodynamic Parameters:       Telemetry:  BiV paced    Physical Exam   Constitutional: He is oriented to person, place, and time. He appears well-developed and well-nourished.   HENT:   Head: Normocephalic and atraumatic.   Eyes: Pupils are equal, round, and reactive to light. Conjunctivae and EOM are normal.   Neck: Normal range of motion. Neck supple. No JVD present. No thyromegaly present.   Cardiovascular: Normal rate and regular rhythm.   Smooth VAD hum, intermittently pulsatile   Pulmonary/Chest: Effort normal and breath sounds normal.   Abdominal: Soft. Bowel sounds are normal.   Musculoskeletal: Normal range of motion. He exhibits no edema.   Neurological: He is alert and oriented to person, place, and time.   Skin: Skin is warm and dry. Capillary refill takes 2 to 3 seconds.   Psychiatric: He has a normal mood and affect. His behavior is normal. Judgment and thought content normal.       Significant Labs:  CBC:  Recent Labs   Lab 07/21/19  0430 07/22/19  0512 07/23/19  0429   WBC 6.53 6.65 5.73   RBC 3.47* 3.48* 3.35*   HGB 10.1* 10.2* 9.9*   HCT 31.4* 31.9* 31.3*    183 184   MCV 91 92 93   MCH 29.1 29.3 29.6   MCHC 32.2 32.0 31.6*     BNP:  Recent Labs   Lab 07/19/19  0400 07/21/19  0430 07/22/19  0512   BNP 1,016* 362* 312*     CMP:  Recent Labs   Lab 07/17/19  0414  07/19/19  0400  07/21/19  0430 07/22/19  0817 07/23/19  0429   *   < > 86   < > 104 103 89   CALCIUM 8.2*   < > 8.3*   < > 9.1 9.4 9.2   ALBUMIN 2.9*  --  2.6*  --   --  3.3*  --    PROT 5.3*  --  5.0*  --   --  6.8  --       < > 134*   < > 135* 137 138   K 5.1   < > 4.1   < > 3.9 4.5 4.2   CO2 24   < > 25   < > 26 24 27      < > 103   < > 101 103 103   BUN 31*   < > 13   < > 10 14 14   CREATININE 2.3*   < > 1.5*   < > 1.5* 1.6* 1.4   ALKPHOS 52*  --  52*  --   --  84  --    ALT 12  --  10  --   --  27  --    AST 24  --  24  --   --  36  --    BILITOT 1.6*  --  0.4  --   --  0.3  --     < > = values in this interval not displayed.      Coagulation:   Recent  Labs   Lab 07/22/19  0817 07/22/19  1501 07/22/19  2120 07/23/19  0430 07/23/19  1157   INR 1.2 1.3*  --  1.5*  --    APTT  --  26.9 31.0 33.3*  33.3* 35.0*     LDH:  Recent Labs   Lab 07/21/19  0430 07/22/19  0817 07/23/19  0430    260 246     Microbiology:  Microbiology Results (last 7 days)     Procedure Component Value Units Date/Time    Blood culture [[id number]] Collected:  07/17/19 1420    Order Status:  Completed Specimen:  Blood from Peripheral, Antecubital, Left Updated:  07/22/19 1612     Blood Culture, Routine No growth after 5 days.    Blood culture [[id number]] Collected:  07/17/19 1452    Order Status:  Completed Specimen:  Blood from Peripheral, Hand, Right Updated:  07/22/19 1612     Blood Culture, Routine No growth after 5 days.    Stool culture [[id number]] Collected:  07/16/19 1731    Order Status:  Completed Specimen:  Stool Updated:  07/19/19 1326     Stool Culture No Salmonella,Shigella,Vibrio,Campylobacter,Yersinia isolated.    Clostridium difficile EIA [[id number]] Collected:  07/17/19 0904    Order Status:  Completed Specimen:  Stool Updated:  07/17/19 1409     C. diff Antigen Negative     C difficile Toxins A+B, EIA Negative     Comment: Testing not recommended for children <24 months old.       E. coli 0157 antigen [[id number]] Collected:  07/16/19 1731    Order Status:  Completed Specimen:  Stool Updated:  07/17/19 1046     Shiga Toxin 1 E.coli Negative     Shiga Toxin 2 E.coli Negative          I have reviewed all pertinent labs within the past 24 hours.    Estimated Creatinine Clearance: 81.5 mL/min (based on SCr of 1.4 mg/dL).    Diagnostic Results:  I have reviewed all pertinent imaging results/findings within the past 24 hours.    Assessment and Plan:     53 M with hx of DCM, s/p HM2 3/8/18 presents to ER with BRBPR and LH/dizziness. Pt says he was feeling fine until yesterday afternoon after lunch. He says his stomach started grumbling a lot and then he started feeling LH/dizzy  with standing. He started having bloody diarrhea around 6 pm last night. Last episode 11:30 this am. Having numbness in lips and hands at times. C/o LLQ abd pain. No N/V.     In ER, Hgb was found to be 7.9, down from 14.5 2 weeks ago      * GIB (gastrointestinal bleeding)  -Acute GIB with hematochezia  -Hgb 7.9 on admit, down from 14.5 ~ 2 weeks ago  -Transfused 10 units PRBCs total since admission without appropriate response in H&H. Hgb stable at 9.9. INR 1.5, boost Coumadin  -EGD unremarkable. C-scope showed non bleeding ulcer transverse colon and cecal ulcer with large clot on it  -Mesalamine started  at GI's request  -GI OK'd to restart coumadin.  -Continue minimal intensity Heparin bridge until D/C'd today.   -Mesenteric US attempted 7/18 to R/O ischemia as cause for ulcers, but it was technically impossible d/t body habitus and presence of LVAD  -CT abdomen 7/16/19- no colitis  -C diff -ve  -Was on Protonix 80 IV BID, changed to po   -Held antiHTNs in the setting of recent massive GI bleed. Resumed Norvasc 5 mg qd today  -D/C home today on no Lovenox bridge, but will have full labs checked Thursday in [place], and have him f/u in VAD clinic in 3-4 weeks.         Smoker  - Continues to smoke occasionally    Thrombocytopenia, unspecified  - Platelet count on admit WNL, and was trending down, but is coming back up to 184K today  - Appreciate Hematology's help - anemia and thrombocytopenia due to GI bleed from colonic ulcer confirmed on endoscopy    Leukocytosis  -C diff -ve  -Stool cultures -ve  -Blood cultures with NGTD  - WCC has now normalized. Leukocytosis likely 2/2 active GI bleed (see thrombocytopenia)    Anemia  -Acute anemia, due to GIB. Hgb 9.9 now  -Hgb 7.9 on admit, down from 14.5 ~ 2 weeks ago  -See GIB    LVAD (left ventricular assist device) present  -Heartmate 2, implanted 3/8/18 as DT (Dr. Kaufman)  -INR sub therapeutic today,with goal 2-3. INR 1.5, boost Coumadin today  -Hold ASA for now  -MAP  goal 60-80,  Have been holding anti-HTNs given recent massive GIB (spironolactone, doxazosin, amlodipine, hydralazine). Resume Norvasc 5 mg qd this morning  -Resumed Bumex last week 2 mg qd,   -TTE 7/17 at 9800 rpm: LVEDD 7.23, LVEF 13%, diastolic dysfunction, RV mildly dilated, normal RV function, mild MR, trace TR, IVC 3, small pericardial effusion, AV does not open, septum bows right    Procedure: Device Interrogation Including analysis of device parameters  Current Settings: Ventricular Assist Device  Review of device function is stable    TXP LVAD INTERROGATIONS 7/23/2019 7/23/2019 7/23/2019 7/22/2019 7/22/2019 7/22/2019 7/22/2019   Type HeartMate II HeartMate II HeartMate II HeartMate II HeartMate II HeartMate II HeartMate II   Flow 5.2 4.6 5.5 5.6 6 5.1 4.8   Speed 9800 9800 9800 9800 9800 9800 9800   PI 3.0 5.4 4.2 3.6 2.5 2.8 2.9   Power ([place]) 6.4 6.0 6.2 6.6 7 6.3 6.0   LSL - 9400 - - - - -   Pulsatility Intermittent pulse Intermittent pulse Intermittent pulse - Intermittent pulse Intermittent pulse Intermittent pulse       CKD (chronic kidney disease), stage III  - Baseline creatinine ~ 1.7-2.1, 1.4 today    PVT (paroxysmal ventricular tachycardia)  -Cont Amio  -ICD in [place] [id number]  Heart Transplant  [facility]

## 2019-07-23 NOTE — DISCHARGE SUMMARY
[facility]  Heart Transplant  Discharge Summary      Patient Name: Tim Richards  MRN: [id number]  Admission Date: 7/16/2019  Hospital Length of Stay: 7 days  Discharge Date and Time: 07/23/2019 3:21 PM  Attending Physician: No att. providers found   Discharging Provider: Maira Crum NP  Primary Care Provider: Ja Méndez MD     HPI: 53 M with hx of DCM, s/p HM2 3/8/18 presents to ER with BRBPR and LH/dizziness. Pt says he was feeling fine until yesterday afternoon after lunch. He says his stomach started grumbling a lot and then he started feeling LH/dizzy with standing. He started having bloody diarrhea around 6 pm last night. Last episode 11:30 this am. Having numbness in lips and hands at times. C/o LLQ abd pain. No N/V.      In ER, Hgb was found to be 7.9, down from 14.5 2 weeks ago     Procedure(s) (LRB):  EGD (ESOPHAGOGASTRODUODENOSCOPY) (N/A)  COLONOSCOPY (N/A)     Hospital Course: INR on admit was 2.7. Coumadin and ASA were held to allow INR to drift down. GI was consulted, and given presentation that sounded more like acute gastroenteritis/food poisoning (including an elevated WCC), a CT of abdomen was done that showed no colitis. C diff was -ve, stool cxs were -ve and blood cxs show NGTD. Hem/Onc was consulted for elevated WCC and new thrombocytopenia, both of which they felt were 2/2 GI bleeding. WCC and platelet counts normalized. He continued to have hematochezia, and ultimately required 10 units of PRBC's. An EGD done 7/18 was unremarkable, but C-scope on same day showed non bleeding ulcer in transverse colon and a cecal ulcer with an enormous clot firmly adhered to base of ulcer. Attempted to do a mesenteric US to evaluate for ischemia as cause of the ulcers, but the study was technically impossible. Low dose Coumadin was resumed after the scopes, and minimal dose Heparin bridge was started on 7/22. His INR was slow to climb, and was only 1.5 on the day of  discharge. LDH and Hgb were stable, however, and he was insistent on leaving to go to [place] to see his 1 year old granddaughter. Decision was made to allow patient to be discharged without a Lovenox bridge, and he will have full labs drawn on Thursday in [place]. All home antihypertensives were held on admit as was his Bumex. Bumex was eventually resumed, and Norvasc was resumed at 5 mg qd on the day of discharge. He will f/u in the VAD clinic in 3-4 weeks. Please see PharmD's plan regarding Coumadin dosing.    Consults (From admission, onward)        Status Ordering Provider     Inpatient consult to Cardiology  Once     Provider:  (Not yet assigned)    Completed SMILEY HARLEY     Inpatient consult to Gastroenterology  Once     Provider:  (Not yet assigned)    Completed CHAUNCEY HARPER     Inpatient consult to Hematology  Once     Provider:  (Not yet assigned)    Completed ROSS GRANGER     Inpatient consult to Social Work/Case Management  Once     Provider:  (Not yet assigned)    Acknowledged DEEPA WYATT JR     Nutrition Services Referral  Once     Provider:  (Not yet assigned)    Completed DEEPA WYATT JR          Significant Diagnostic Studies: See above    Pending Diagnostic Studies:     Procedure Component Value Units Date/Time    CBC auto differential [[id number]] Collected:  07/23/19 0430    Order Status:  Sent Lab Status:  In process Updated:  07/23/19 0430    Specimen:  Blood         Final Active Diagnoses:    Diagnosis Date Noted POA    PRINCIPAL PROBLEM:  GIB (gastrointestinal bleeding) [K92.2] 07/16/2019 Unknown    GI bleed [K92.2] 07/19/2019 Yes    History of bleeding ulcers [Z87.11]  Not Applicable    Thrombocytopenia, unspecified [D69.6] 07/18/2019 Unknown    Smoker [F17.200] 07/18/2019 Unknown    Leukocytosis [D72.829] 07/17/2019 No    Bloody diarrhea [R19.7]  Yes    SOB (shortness of breath) [R06.02] 07/16/2019 Yes    Hypertension [I10] 03/27/2018 Yes     Anemia [D64.9] 03/12/2018 Yes    LVAD (left ventricular assist device) present [Z95.811] 03/08/2018 Not Applicable    CKD (chronic kidney disease), stage III [N18.3] 01/12/2018 Yes    Dilated cardiomyopathy [I42.0] 01/10/2018 Yes    PVT (paroxysmal ventricular tachycardia) [I47.2] 01/10/2018 Yes      Problems Resolved During this Admission:      Discharged Condition: stable    Disposition: Home or Self Care    Follow Up:  Follow-up Information     [facility] CLINIC, CARDIOVASCULAR SURGERY In 1 month.               Patient Instructions:      Diet Cardiac     Notify your health care provider if you experience any of the following:  temperature >100.4     Notify your health care provider if you experience any of the following:  persistent nausea and vomiting or diarrhea     Notify your health care provider if you experience any of the following:  redness, tenderness, or signs of infection (pain, swelling, redness, odor or green/yellow discharge around incision site)     Notify your health care provider if you experience any of the following:  severe uncontrolled pain     Notify your health care provider if you experience any of the following:  difficulty breathing or increased cough     Notify your health care provider if you experience any of the following:  severe persistent headache     Notify your health care provider if you experience any of the following:  worsening rash     Notify your health care provider if you experience any of the following:  persistent dizziness, light-headedness, or visual disturbances     Notify your health care provider if you experience any of the following:  increased confusion or weakness     Activity as tolerated     Medications:  Reconciled Home Medications:      Medication List      START taking these medications    mesalamine 250 mg Cpsr  Commonly known as:  PENTASA  Take 4 capsules (1,000 mg total) by mouth 4 (four) times daily.        CHANGE how you take these medications     amLODIPine 5 MG tablet  Commonly known as:  NORVASC  Take 1 tablet (5 mg total) by mouth once daily.  What changed:    · medication strength  · how much to take     warfarin 5 MG tablet  Commonly known as:  COUMADIN  Take 1.5 tabs by mouth on 7/23 only, followed by weekly dose of 1 tablet daily, except 1.5 tabs on Mon, Wed, Fri  What changed:  See the new instructions.        CONTINUE taking these medications    allopurinol 100 MG tablet  Commonly known as:  ZYLOPRIM  TAKE 1 TABLET BY MOUTH EVERY DAY     amiodarone 200 MG Tab  Commonly known as:  PACERONE  TAKE 1 TABLET (200 MG TOTAL) BY MOUTH ONCE DAILY.     bumetanide 2 MG tablet  Commonly known as:  BUMEX  Take 1 tablet (2 mg total) by mouth once daily.     ferrous gluconate 324 MG tablet  Commonly known as:  FERGON  Take 1 tablet (324 mg total) by mouth daily with breakfast.     magnesium oxide 400 mg (241.3 mg magnesium) tablet  Commonly known as:  MAG-OX  TAKE 1 TABLET (400 MG TOTAL) BY MOUTH 3 (THREE) TIMES DAILY.     pantoprazole 40 MG tablet  Commonly known as:  PROTONIX  TAKE 1 TABLET (40 MG TOTAL) BY MOUTH ONCE DAILY.     potassium chloride 20 mEq  Commonly known as:  K-TAB  Take 1 tablet (20 mEq total) by mouth 2 (two) times daily.     sildenafil 100 MG tablet  Commonly known as:  VIAGRA  Take 1 tablet (100 mg total) by mouth daily as needed for Erectile Dysfunction.        STOP taking these medications    aspirin 81 MG EC tablet  Commonly known as:  ECOTRIN     doxazosin 8 MG Tab  Commonly known as:  CARDURA     hydrALAZINE 100 MG tablet  Commonly known as:  APRESOLINE     spironolactone 50 MG tablet  Commonly known as:  [place] [id number]  Heart Transplant  [facility]

## 2019-07-23 NOTE — PROGRESS NOTES
07/23/2019  Casper Harris    Current provider:  Amy Rhodes MD      I, Casper Harris, rounded on Tim Richards to ensure all mechanical assist device settings (IABP or VAD) were appropriate and all parameters were within limits.  I was able to ensure all back up equipment was present, the staff had no issues, and the Perfusion Department daily rounding was complete.    9:46 AM

## 2019-07-23 NOTE — ASSESSMENT & PLAN NOTE
-Heartmate 2, implanted 3/8/18 as DT (Dr. Kaufman)  -INR sub therapeutic today,with goal 2-3. INR 1.5, boost Coumadin today  -Hold ASA for now  -MAP goal 60-80,  Have been holding anti-HTNs given recent massive GIB (spironolactone, doxazosin, amlodipine, hydralazine). Resume Norvasc 5 mg qd this morning  -Resumed Bumex last week 2 mg qd,   -TTE 7/17 at 9800 rpm: LVEDD 7.23, LVEF 13%, diastolic dysfunction, RV mildly dilated, normal RV function, mild MR, trace TR, IVC 3, small pericardial effusion, AV does not open, septum bows right    Procedure: Device Interrogation Including analysis of device parameters  Current Settings: Ventricular Assist Device  Review of device function is stable    TXP LVAD INTERROGATIONS 7/23/2019 7/23/2019 7/23/2019 7/22/2019 7/22/2019 7/22/2019 7/22/2019   Type HeartMate II HeartMate II HeartMate II HeartMate II HeartMate II HeartMate II HeartMate II   Flow 5.2 4.6 5.5 5.6 6 5.1 4.8   Speed 9800 9800 9800 9800 9800 9800 9800   PI 3.0 5.4 4.2 3.6 2.5 2.8 2.9   Power (Jackson) 6.4 6.0 6.2 6.6 7 6.3 6.0   [facility] - 9400 - - - - -   Pulsatility Intermittent pulse Intermittent pulse Intermittent pulse - Intermittent pulse Intermittent pulse Intermittent pulse

## 2019-07-24 ENCOUNTER — TELEPHONE (OUTPATIENT)
Dept: TRANSPLANT | Facility: CLINIC | Age: 54
End: 2019-07-24

## 2019-07-24 NOTE — PROGRESS NOTES
D/C note:    SW to pt's room for D/C. Pt aaox4, calm, and pleasant. Pt reports in agreement with plan to D/C today with no home service needs. Pt requesting letter from MD clearing him to return to work on August 5. SW to speak with MD about letter, and then email it to pt. Pt reports coping well at this time. Pt reports wife to provide transport. SW providing psychosocial and counseling support, education, assistance, resources, and D/C planning as indicated. SW remains available.

## 2019-07-24 NOTE — TELEPHONE ENCOUNTER
"Called to inform pt and pt wife of travel claim letter completion and fax. On speakerphone with pt and pt wife.   Asked pt how trip to [place] was going, he reports they ended up "cancelling because we were discharged too late to make the flight".  Pt reports he will be traveling to [place] 7/30-8/5/19 instead.  Pt also reports he would like to do labs in [facility] tomorrow.  Labs rescheduled for local .   Pt encouraged to call VAD coordinator with any questions problems or concerns. Pt reminded to page VAD coordinator on call for emergencies.  Pt verbalized understanding and in agreement of plan.    Sigrid Hackett of coumadin notified.    Dr. Rhodes notified and in agreement of plan.   "

## 2019-07-24 NOTE — PROGRESS NOTES
Pt wife reports pt was informed to take 7.5 mg wed,thu and 5 mg Friday them resume is regular dose of 7.5 mg m,w,f and 5 all other days.  Reports pt has vad appts 7/25.

## 2019-07-25 ENCOUNTER — ANTI-COAG VISIT (OUTPATIENT)
Dept: CARDIOLOGY | Facility: CLINIC | Age: 54
End: 2019-07-25
Payer: COMMERCIAL

## 2019-07-25 ENCOUNTER — PATIENT OUTREACH (OUTPATIENT)
Dept: ADMINISTRATIVE | Facility: CLINIC | Age: 54
End: 2019-07-25

## 2019-07-25 ENCOUNTER — LAB VISIT (OUTPATIENT)
Dept: LAB | Facility: HOSPITAL | Age: 54
End: 2019-07-25
Attending: INTERNAL MEDICINE
Payer: COMMERCIAL

## 2019-07-25 DIAGNOSIS — I50.9 HEART FAILURE: ICD-10-CM

## 2019-07-25 DIAGNOSIS — Z79.01 LONG TERM (CURRENT) USE OF ANTICOAGULANTS: ICD-10-CM

## 2019-07-25 DIAGNOSIS — Z95.811 LVAD (LEFT VENTRICULAR ASSIST DEVICE) PRESENT: ICD-10-CM

## 2019-07-25 DIAGNOSIS — Z95.811 HEART REPLACED BY HEART ASSIST DEVICE: ICD-10-CM

## 2019-07-25 LAB
ALBUMIN SERPL BCP-MCNC: 3.5 G/DL (ref 3.5–5.2)
ALP SERPL-CCNC: 89 U/L (ref 55–135)
ALT SERPL W/O P-5'-P-CCNC: 45 U/L (ref 10–44)
ANION GAP SERPL CALC-SCNC: 9 MMOL/L (ref 8–16)
AST SERPL-CCNC: 51 U/L (ref 10–40)
BASOPHILS # BLD AUTO: 0.01 K/UL (ref 0–0.2)
BASOPHILS NFR BLD: 0.2 % (ref 0–1.9)
BILIRUB SERPL-MCNC: 0.3 MG/DL (ref 0.1–1)
BNP SERPL-MCNC: 627 PG/ML (ref 0–99)
BUN SERPL-MCNC: 12 MG/DL (ref 6–20)
CALCIUM SERPL-MCNC: 8.9 MG/DL (ref 8.7–10.5)
CHLORIDE SERPL-SCNC: 105 MMOL/L (ref 95–110)
CO2 SERPL-SCNC: 25 MMOL/L (ref 23–29)
CREAT SERPL-MCNC: 1.7 MG/DL (ref 0.5–1.4)
DIFFERENTIAL METHOD: ABNORMAL
EOSINOPHIL # BLD AUTO: 0 K/UL (ref 0–0.5)
EOSINOPHIL NFR BLD: 0 % (ref 0–8)
ERYTHROCYTE [DISTWIDTH] IN BLOOD BY AUTOMATED COUNT: 15.5 % (ref 11.5–14.5)
EST. GFR  (AFRICAN AMERICAN): 52 ML/MIN/1.73 M^2
EST. GFR  (NON AFRICAN AMERICAN): 45 ML/MIN/1.73 M^2
GLUCOSE SERPL-MCNC: 99 MG/DL (ref 70–110)
HCT VFR BLD AUTO: 32.6 % (ref 40–54)
HGB BLD-MCNC: 10.4 G/DL (ref 14–18)
INR PPP: 1.9 (ref 0.8–1.2)
LYMPHOCYTES # BLD AUTO: 1 K/UL (ref 1–4.8)
LYMPHOCYTES NFR BLD: 25.3 % (ref 18–48)
MCH RBC QN AUTO: 29.1 PG (ref 27–31)
MCHC RBC AUTO-ENTMCNC: 31.9 G/DL (ref 32–36)
MCV RBC AUTO: 91 FL (ref 82–98)
MONOCYTES # BLD AUTO: 0.4 K/UL (ref 0.3–1)
MONOCYTES NFR BLD: 9.4 % (ref 4–15)
NEUTROPHILS # BLD AUTO: 2.6 K/UL (ref 1.8–7.7)
NEUTROPHILS NFR BLD: 65.3 % (ref 38–73)
PLATELET # BLD AUTO: 271 K/UL (ref 150–350)
PMV BLD AUTO: 9.6 FL (ref 9.2–12.9)
POTASSIUM SERPL-SCNC: 3.9 MMOL/L (ref 3.5–5.1)
PROT SERPL-MCNC: 6.8 G/DL (ref 6–8.4)
PROTHROMBIN TIME: 19.4 SEC (ref 9–12.5)
RBC # BLD AUTO: 3.58 M/UL (ref 4.6–6.2)
SODIUM SERPL-SCNC: 139 MMOL/L (ref 136–145)
WBC # BLD AUTO: 4.03 K/UL (ref 3.9–12.7)

## 2019-07-25 PROCEDURE — 85025 COMPLETE CBC W/AUTO DIFF WBC: CPT

## 2019-07-25 PROCEDURE — 85610 PROTHROMBIN TIME: CPT

## 2019-07-25 PROCEDURE — 36415 COLL VENOUS BLD VENIPUNCTURE: CPT

## 2019-07-25 PROCEDURE — 93793 PR ANTICOAGULANT MGMT FOR PT TAKING WARFARIN: ICD-10-PCS | Mod: S$GLB,,,

## 2019-07-25 PROCEDURE — 83880 ASSAY OF NATRIURETIC PEPTIDE: CPT

## 2019-07-25 PROCEDURE — 80053 COMPREHEN METABOLIC PANEL: CPT

## 2019-07-25 PROCEDURE — 93793 ANTICOAG MGMT PT WARFARIN: CPT | Mod: S$GLB,,,

## 2019-07-25 NOTE — PROGRESS NOTES
Patient wife advised of dose instructions and verbalized understanding.  Will fax standing order to wife [phone] while pt will be in [place] next week .

## 2019-07-25 NOTE — PATIENT INSTRUCTIONS
Dizziness (Vertigo) and Balance Problems: Ensuring Your Safety  Falls or accidents can lead to pain, broken bones, and fear of future falls. Protect yourself and others by preparing for episodes. Simple steps can help increase your safety at home and wherever you go.  Lighting  Keep all areas well lit. This helps your eyes send the right signals to the brain. It also makes you less likely to trip and fall. If bright lights make symptoms worse, dim the lights or lie in a dark room until the dizziness passes. Then turn the lights back to their normal level. Tips:  Keep a flashlight by the bed.  Place nightlights in bathrooms and hallways.  Replace burned-out bulbs, or have someone replace them for you.  Fall Prevention  To reduce your risk of falling:  Rise out of a bed or chair slowly.  Wear low-heeled shoes that fit properly and have slip-resistant soles.  Remove throw rugs. Clear clutter from walkways.  Use handrails on stairs. Have handrails installed or adjusted if needed.  Install grab bars in the bathroom. Don't use towel racks for balance.  Use a shower stool. Also, apply adhesive strips to the shower or tub floor.  Going Out  With a little time and preparation, you can get around safely. Tips:  Bring a cane or walking aid if needed.  Give yourself plenty of time in case a dizziness episode begins.  Be patient. If an activity like walking through a crowded shop causes you stress, you may not be ready for it yet.  Driving  If you become dizzy or disoriented while driving, you could hurt yourself and others. That's why it's best to avoid driving until symptoms subside. In some cases, your license may be temporarily held until it's safe for you to drive again. For safety:  Ask a friend to drive for you.  Take public transportation.  Walk to stores and other places when you can.  Asking for Help  Don't be afraid to ask for help running errands, cooking meals, and doing exercise. Whether it's a friend, loved one,  neighbor, or stranger on the street, a little help can make a world of difference.                   © 0235-6402 [facility], [address] [place] [address]. All rights reserved. This information is not intended as a substitute for professional medical care. Always follow your healthcare professional's instructions.    Sepsis   Sepsis occurs when your body responds to bacteremia - the presence of bacteria in your bloodstream. Sepsis can be deadly. Blood pressure may drop and the lungs and kidneys may start to fail. Emergency care for sepsis is crucial   When to Go to the Emergency Department (ED)   Sepsis is a medical emergency. Go to the nearest emergency department if a fever is present with any of these symptoms:   Chills and shaking   Fever or low body temperature (hypothermia)   Rapid heartbeat and breathing; shortness of breath   Nausea or vomiting   Confusion, dizziness   Skin rash   Decreased urination  © 5602-5099 [employer], [address] [place] [address]. All rights reserved. This information is not intended as a substitute for professional medical care. Always follow your healthcare professional's instructions

## 2019-07-26 NOTE — PROGRESS NOTES
Spoke to pts spouse; she states they are going out of town and are unable to come in for hosp f/u; she also states he has f/u with specialist and does not need to see PCP; she is aware Dr christianson is leaving and does not want to schedule F/U; she states they will probably change PCP to Dr Bruner in [place]

## 2019-07-30 ENCOUNTER — TELEPHONE (OUTPATIENT)
Dept: TRANSPLANT | Facility: CLINIC | Age: 54
End: 2019-07-30

## 2019-07-30 ENCOUNTER — PATIENT MESSAGE (OUTPATIENT)
Dept: TRANSPLANT | Facility: CLINIC | Age: 54
End: 2019-07-30

## 2019-07-31 ENCOUNTER — PATIENT MESSAGE (OUTPATIENT)
Dept: TRANSPLANT | Facility: CLINIC | Age: 54
End: 2019-07-31

## 2019-07-31 ENCOUNTER — DOCUMENTATION ONLY (OUTPATIENT)
Dept: TRANSPLANT | Facility: CLINIC | Age: 54
End: 2019-07-31

## 2019-07-31 ENCOUNTER — ANTI-COAG VISIT (OUTPATIENT)
Dept: CARDIOLOGY | Facility: CLINIC | Age: 54
End: 2019-07-31
Payer: COMMERCIAL

## 2019-07-31 DIAGNOSIS — Z95.811 LVAD (LEFT VENTRICULAR ASSIST DEVICE) PRESENT: ICD-10-CM

## 2019-07-31 DIAGNOSIS — Z79.01 LONG TERM (CURRENT) USE OF ANTICOAGULANTS: ICD-10-CM

## 2019-07-31 LAB
BNP: 377
EXT ALBUMIN: 4.1
EXT ALT: 24
EXT AST: 31
EXT BUN: 18
EXT CALCIUM: 9.2
EXT CHLORIDE: 96
EXT CREATININE: 1.89 MG/DL
EXT GLUCOSE: 87
EXT HEMATOCRIT: 36.6
EXT HEMOGLOBIN: 11.7
EXT MAGNESIUM: 2.11
EXT PLATELETS: 283
EXT POTASSIUM: 4.1
EXT PROTEIN TOTAL: 6.8
EXT SODIUM: 136 MMOL/L
EXT WBC: 7.13
INR PPP: 2.1
LDH SERPL L TO P-CCNC: 300 U/L (ref 80–250)

## 2019-07-31 PROCEDURE — 93793 PR ANTICOAGULANT MGMT FOR PT TAKING WARFARIN: ICD-10-PCS | Mod: S$GLB,,,

## 2019-07-31 PROCEDURE — 93793 ANTICOAG MGMT PT WARFARIN: CPT | Mod: S$GLB,,,

## 2019-08-05 ENCOUNTER — TELEPHONE (OUTPATIENT)
Dept: TRANSPLANT | Facility: CLINIC | Age: 54
End: 2019-08-05

## 2019-08-05 NOTE — TELEPHONE ENCOUNTER
Patient's wife paged VAD coordinator on call stating that battery charger not working. They have tried 4 different plugs, turned  off and on, and unplugged cord from battery charger and confirmed no dust/debris.     Patient has 4 fully charged batteries and plans to stay in tonight and stay plugged into wall power overnight.     Wife states they can come tomorrow to  loaner battery charger.     VAD coordinator remains available.

## 2019-08-28 ENCOUNTER — OFFICE VISIT (OUTPATIENT)
Dept: TRANSPLANT | Facility: CLINIC | Age: 54
End: 2019-08-28
Payer: COMMERCIAL

## 2019-08-28 ENCOUNTER — ANTI-COAG VISIT (OUTPATIENT)
Dept: CARDIOLOGY | Facility: CLINIC | Age: 54
End: 2019-08-28
Payer: COMMERCIAL

## 2019-08-28 ENCOUNTER — CLINICAL SUPPORT (OUTPATIENT)
Dept: TRANSPLANT | Facility: CLINIC | Age: 54
End: 2019-08-28
Payer: COMMERCIAL

## 2019-08-28 ENCOUNTER — HOSPITAL ENCOUNTER (OUTPATIENT)
Dept: PULMONOLOGY | Facility: CLINIC | Age: 54
Discharge: HOME | End: 2019-08-28
Payer: COMMERCIAL

## 2019-08-28 VITALS
SYSTOLIC BLOOD PRESSURE: 110 MMHG | WEIGHT: 271 LBS | BODY MASS INDEX: 35.92 KG/M2 | HEIGHT: 73 IN | HEART RATE: 82 BPM | TEMPERATURE: 99 F

## 2019-08-28 DIAGNOSIS — Z79.01 LONG TERM (CURRENT) USE OF ANTICOAGULANTS: ICD-10-CM

## 2019-08-28 DIAGNOSIS — I10 ESSENTIAL HYPERTENSION: ICD-10-CM

## 2019-08-28 DIAGNOSIS — Z95.811 LVAD (LEFT VENTRICULAR ASSIST DEVICE) PRESENT: ICD-10-CM

## 2019-08-28 DIAGNOSIS — N18.30 CKD (CHRONIC KIDNEY DISEASE), STAGE III: ICD-10-CM

## 2019-08-28 DIAGNOSIS — I13.0 HYPERTENSIVE CARDIOVASCULAR-RENAL DISEASE, STAGE 1-4 OR UNSPECIFIED CHRONIC KIDNEY DISEASE, WITH HEART FAILURE: ICD-10-CM

## 2019-08-28 DIAGNOSIS — Z79.899 HIGH RISK MEDICATIONS (NOT ANTICOAGULANTS) LONG-TERM USE: ICD-10-CM

## 2019-08-28 DIAGNOSIS — I50.42 CHRONIC COMBINED SYSTOLIC AND DIASTOLIC HEART FAILURE: ICD-10-CM

## 2019-08-28 DIAGNOSIS — T82.9XXD COMPLICATION INVOLVING LEFT VENTRICULAR ASSIST DEVICE (LVAD), SUBSEQUENT ENCOUNTER: Primary | ICD-10-CM

## 2019-08-28 PROCEDURE — 99214 PR OFFICE/OUTPT VISIT, EST, LEVL IV, 30-39 MIN: ICD-10-PCS | Mod: S$GLB,,, | Performed by: INTERNAL MEDICINE

## 2019-08-28 PROCEDURE — 94010 BREATHING CAPACITY TEST: ICD-10-PCS | Mod: S$GLB,,, | Performed by: INTERNAL MEDICINE

## 2019-08-28 PROCEDURE — 99214 OFFICE O/P EST MOD 30 MIN: CPT | Mod: S$GLB,,, | Performed by: INTERNAL MEDICINE

## 2019-08-28 PROCEDURE — 93750 INTERROGATION VAD IN PERSON: CPT | Mod: S$GLB,,, | Performed by: INTERNAL MEDICINE

## 2019-08-28 PROCEDURE — 93750 OP LVAD INTERROGATION: ICD-10-PCS | Mod: S$GLB,,, | Performed by: INTERNAL MEDICINE

## 2019-08-28 PROCEDURE — 94727 PR PULM FUNCTION TEST BY GAS: ICD-10-PCS | Mod: S$GLB,,, | Performed by: INTERNAL MEDICINE

## 2019-08-28 PROCEDURE — 99999 PR PBB SHADOW E&M-EST. PATIENT-LVL IV: CPT | Mod: PBBFAC,,, | Performed by: INTERNAL MEDICINE

## 2019-08-28 PROCEDURE — 94010 BREATHING CAPACITY TEST: CPT | Mod: S$GLB,,, | Performed by: INTERNAL MEDICINE

## 2019-08-28 PROCEDURE — 93793 PR ANTICOAGULANT MGMT FOR PT TAKING WARFARIN: ICD-10-PCS | Mod: S$GLB,,,

## 2019-08-28 PROCEDURE — 99999 PR PBB SHADOW E&M-EST. PATIENT-LVL IV: ICD-10-PCS | Mod: PBBFAC,,, | Performed by: INTERNAL MEDICINE

## 2019-08-28 PROCEDURE — 94727 GAS DIL/WSHOT DETER LNG VOL: CPT | Mod: S$GLB,,, | Performed by: INTERNAL MEDICINE

## 2019-08-28 PROCEDURE — 94729 PR C02/MEMBANE DIFFUSE CAPACITY: ICD-10-PCS | Mod: S$GLB,,, | Performed by: INTERNAL MEDICINE

## 2019-08-28 PROCEDURE — 93793 ANTICOAG MGMT PT WARFARIN: CPT | Mod: S$GLB,,,

## 2019-08-28 PROCEDURE — 94729 DIFFUSING CAPACITY: CPT | Mod: S$GLB,,, | Performed by: INTERNAL MEDICINE

## 2019-08-28 RX ORDER — POTASSIUM CHLORIDE 1500 MG/1
20 TABLET, EXTENDED RELEASE ORAL 2 TIMES DAILY
Qty: 60 TABLET | Refills: 11 | Status: SHIPPED | OUTPATIENT
Start: 2019-08-28 | End: 2019-12-05 | Stop reason: SDUPTHER

## 2019-08-28 RX ORDER — HYDRALAZINE HYDROCHLORIDE 50 MG/1
100 TABLET, FILM COATED ORAL 3 TIMES DAILY
Qty: 180 TABLET | Refills: 11 | Status: SHIPPED | OUTPATIENT
Start: 2019-08-28 | End: 2019-09-06

## 2019-08-28 RX ORDER — AMLODIPINE BESYLATE 5 MG/1
10 TABLET ORAL DAILY
Qty: 60 TABLET | Refills: 11 | Status: SHIPPED | OUTPATIENT
Start: 2019-08-28 | End: 2019-08-30 | Stop reason: SDUPTHER

## 2019-08-28 NOTE — LETTER
September 3, 2019        Nader Chiu  [address]  [address]  [place] [address]  Phone: [phone]  Fax: [phone]             [facility] [address] [place] [address]  Phone: [phone]   Patient: Tim Richards   MR Number: [id number]   YOB: 1965   Date of Visit: 8/28/2019       Dear Dr. Nader Chiu    Thank you for referring Tim Richards to me for evaluation. Attached you will find relevant portions of my assessment and plan of care.    If you have questions, please do not hesitate to call me. I look forward to following Tim Richards along with you.    Sincerely,    Amy Rhodes MD    Enclosure    If you would like to receive this communication electronically, please contact [email or web address] or [phone] to request [employer] Link access.    [employer] Link is a tool which provides read-only access to select patient information with whom you have a relationship. Its easy to use and provides real time access to review your patients record including encounter summaries, notes, results, and demographic information.    If you feel you have received this communication in error or would no longer like to receive these types of communications, please e-mail [email or web address]

## 2019-08-28 NOTE — PROGRESS NOTES
Date of Implant with Heartmate II LVAD: 3/8/18    PATIENT ARRIVED IN CLINIC:  Ambulatory   Accompanied by: self    Vitals  Temperature, oral:   Temp Readings from Last 1 Encounters:   19 99.2 °F (37.3 °C) (Oral)     Blood Pressure:   BP Readings from Last 3 Encounters:   19 (!) 110/0   19 (!) 76/0   19 (!) 94/0        VAD Interrogation:  TXP SACHI INTERROGATIONS 2019   Type HeartMate II - -   Flow 5 - -   Speed 9800 - -   PI 5.1 - -   Power (Jackson) 6.2 - -   LSL 9400 - -   Pulsatility No Pulse Intermittent pulse Intermittent pulse       Flow in history: 6-7s  History Lo.log  Problems / Issues / Alarms with VAD if any: None noted  VAD Sounds: HM2 sound Smooth      HCT:   Lab Results   Component Value Date    HCT 42.9 2019    HCT 26 (L) 03/10/2018       Complaints/reason for visit today: routine  Emergency Equipment With Patient: yes   VAD Binder With Patient: no   Reviewed VAD Numbers In Binder: no    Any Equipment Issues: no (Refer to  note for complete details)    DLES Assessment:  Appearance Of Driveline: 1  Antibiotics: NO  Velour: no  Manual & Visual Inspection Of Driveline: Tear, rescue tape applied-  Stabilization Device In Use: yes, sun securement device      Patient MyChart Questionnaire: No flowsheet data found.     Assessment:   PAIN: NO  Complaints Of Nausea / Vomiting: None noted    Appearance and Frequency Of Stools: normal and formed without blood & every other day  Color Of Urine: clear/yellow  Coping/Depression/Anxiety: coping okay  Sleep Habits: 7 hrs /night  Sleep Aids: None noted  Showering: Yes, reminded to change dressing immediately after drying off  Activity/Exercise: walking and working full time   Driving: Yes. Reminded to pull over should there be an alarm before looking down at controller.    DLES Dressing Care:   Frequency of Dressing Changes: every other day & daily kit  Pt In Need Of Management  Kits?:yes -   2 Box of daily kit    Labs:    Chemistry        Component Value Date/Time     08/28/2019 1124    K 4.2 08/28/2019 1124     08/28/2019 1124    CO2 27 08/28/2019 1124    BUN 16 08/28/2019 1124    CREATININE 1.9 (H) 08/28/2019 1124    GLU 92 08/28/2019 1124        Component Value Date/Time    CALCIUM 9.2 08/28/2019 1124    ALKPHOS 94 08/28/2019 1124    AST 35 08/28/2019 1124    ALT 19 08/28/2019 1124    BILITOT 0.4 08/28/2019 1124    ESTGFRAFRICA 45.8 (A) 08/28/2019 1124    EGFRNONAA 39.7 (A) 08/28/2019 1124            Magnesium   Date Value Ref Range Status   08/28/2019 2.4 1.6 - 2.6 mg/dL Final       Lab Results   Component Value Date    WBC 6.40 08/28/2019    HGB 13.5 (L) 08/28/2019    HCT 42.9 08/28/2019    MCV 87 08/28/2019     08/28/2019       Lab Results   Component Value Date    INR 2.7 (H) 08/28/2019    INR 2.1 07/31/2019    INR 1.9 (H) 07/25/2019       BNP   Date Value Ref Range Status   08/28/2019 433 (H) 0 - 99 pg/mL Final     Comment:     Values of less than 100 pg/ml are consistent with non-CHF populations.   07/25/2019 627 (H) 0 - 99 pg/mL Final     Comment:     Values of less than 100 pg/ml are consistent with non-CHF populations.   07/22/2019 312 (H) 0 - 99 pg/mL Final     Comment:     Values of less than 100 pg/ml are consistent with non-CHF populations.       LD   Date Value Ref Range Status   08/28/2019 249 (H) 84 - 195 U/L Final     Comment:     Results are increased in hemolyzed samples.   07/31/2019 300 (A) 80 - 250 U/L Final   07/23/2019 246 110 - 260 U/L Final     Comment:     Results are increased in hemolyzed samples.       Labs reviewed with patient: YES      Patient Satisfaction Survey completed per patient: No  (explained about signature and box to check)  Medication reconciliation: per MA.  Medication Detail updated today: yes  Coumadin Managed by: [facility]    Education: Reviewed driveline care, emergency procedures, how to change the  controller, alarms with patient, as well as discussed how to page the VAD coordinator in case of an emergency. It is medically necessary to have VAD management kits in order to prevent infection or to assist in the healing of an infected DLES.     Plans/Needs: routine f/u, first f/u since hospital discharge. BP elevated, , doppler 110. Rechecked doppler, 110. Pt's BP meds were stopped while hospitalized. Pt to increase Norvasc to 10mg once daily and restart Hydralazine at 100mg TID. Plan for labs and BP Friday AM. RTC 6 weeks.      Hurricane Season: No

## 2019-08-29 ENCOUNTER — PATIENT MESSAGE (OUTPATIENT)
Dept: TRANSPLANT | Facility: CLINIC | Age: 54
End: 2019-08-29

## 2019-08-29 LAB
DLCO ADJ PRE: 21.45 ML/(MIN*MMHG) (ref 26.19–40.05)
DLCO SINGLE BREATH LLN: 26.19
DLCO SINGLE BREATH PRE REF: 62.7 %
DLCO SINGLE BREATH REF: 33.12
DLCOC SBVA LLN: 3.19
DLCOC SBVA PRE REF: 104.3 %
DLCOC SBVA REF: 4.3
DLCOC SINGLE BREATH LLN: 26.19
DLCOC SINGLE BREATH PRE REF: 64.8 %
DLCOC SINGLE BREATH REF: 33.12
DLCOCSBVAULN: 5.41
DLCOCSINGLEBREATHULN: 40.05
DLCOSINGLEBREATHULN: 40.05
DLCOVA LLN: 3.19
DLCOVA PRE REF: 100.9 %
DLCOVA PRE: 4.34 ML/(MIN*MMHG*L) (ref 3.19–5.41)
DLCOVA REF: 4.3
DLCOVAULN: 5.41
DLVAADJ PRE: 4.48 ML/(MIN*MMHG*L) (ref 3.19–5.41)
ERVN2 LLN: 1.37
ERVN2 PRE REF: 33.6 %
ERVN2 PRE: 0.46 L (ref 1.37–1.37)
ERVN2 REF: 1.37
ERVN2ULN: 1.37
FEF 25 75 LLN: 1.47
FEF 25 75 PRE REF: 38.6 %
FEF 25 75 REF: 3.19
FEV05 LLN: 2.08
FEV05 REF: 3.21
FEV1 FVC LLN: 68
FEV1 FVC PRE REF: 87.9 %
FEV1 FVC REF: 79
FEV1 LLN: 2.68
FEV1 PRE REF: 54 %
FEV1 REF: 3.58
FRCN2 LLN: 2.72
FRCN2 PRE REF: 61 %
FRCN2 REF: 3.71
FRCN2ULN: 4.69
FVC LLN: 3.46
FVC PRE REF: 61.3 %
FVC REF: 4.56
IVC PRE: 2.96 L (ref 3.46–5.66)
IVC SINGLE BREATH LLN: 3.46
IVC SINGLE BREATH PRE REF: 65 %
IVC SINGLE BREATH REF: 4.56
IVCSINGLEBREATHULN: 5.66
PEF LLN: 6.58
PEF PRE REF: 72.9 %
PEF REF: 9.46
PRE DLCO: 20.75 ML/(MIN*MMHG) (ref 26.19–40.05)
PRE FEF 25 75: 1.23 L/S (ref 1.47–4.91)
PRE FET 100: 7.09 SEC
PRE FEV05 REF: 43.5 %
PRE FEV1 FVC: 69.26 % (ref 68.06–89.51)
PRE FEV1: 1.94 L (ref 2.68–4.49)
PRE FEV5: 1.4 L (ref 2.08–4.35)
PRE FRC N2: 2.26 L
PRE FVC: 2.79 L (ref 3.46–5.66)
PRE PEF: 6.9 L/S (ref 6.58–12.33)
RVN2 LLN: 1.66
RVN2 PRE REF: 77 %
RVN2 PRE: 1.8 L (ref 1.66–3.01)
RVN2 REF: 2.34
RVN2TLCN2 LLN: 25.26
RVN2TLCN2 PRE REF: 111.6 %
RVN2TLCN2 PRE: 38.22 % (ref 25.26–43.22)
RVN2TLCN2 REF: 34.24
RVN2TLCN2ULN: 43.22
RVN2ULN: 3.01
TLCN2 LLN: 6.55
TLCN2 PRE REF: 61.2 %
TLCN2 PRE: 4.71 L (ref 6.55–8.85)
TLCN2 REF: 7.7
TLCN2ULN: 8.85
VA PRE: 4.79 L (ref 7.55–7.55)
VA SINGLE BREATH LLN: 7.55
VA SINGLE BREATH PRE REF: 63.4 %
VA SINGLE BREATH REF: 7.55
VASINGLEBREATHULN: 7.55
VCMAXN2 LLN: 3.46
VCMAXN2 PRE REF: 63.8 %
VCMAXN2 PRE: 2.91 L (ref 3.46–5.66)
VCMAXN2 REF: 4.56
VCMAXN2ULN: 5.66

## 2019-08-30 ENCOUNTER — CLINICAL SUPPORT (OUTPATIENT)
Dept: TRANSPLANT | Facility: CLINIC | Age: 54
End: 2019-08-30
Payer: COMMERCIAL

## 2019-08-30 VITALS — SYSTOLIC BLOOD PRESSURE: 100 MMHG

## 2019-08-30 PROCEDURE — 99999 PR PBB SHADOW E&M-EST. PATIENT-LVL II: ICD-10-PCS | Mod: PBBFAC,,,

## 2019-08-30 PROCEDURE — 99999 PR PBB SHADOW E&M-EST. PATIENT-LVL II: CPT | Mod: PBBFAC,,,

## 2019-08-30 RX ORDER — DOXAZOSIN 8 MG/1
8 TABLET ORAL DAILY
Qty: 30 TABLET | Refills: 11 | Status: CANCELLED | OUTPATIENT
Start: 2019-08-30 | End: 2020-08-29

## 2019-08-30 RX ORDER — AMLODIPINE BESYLATE 10 MG/1
10 TABLET ORAL DAILY
Qty: 30 TABLET | Refills: 11 | Status: SHIPPED | OUTPATIENT
Start: 2019-08-30 | End: 2020-09-09

## 2019-08-30 NOTE — PROGRESS NOTES
POC reviewed with pt and family at 1400. Pt's pain and agitation controlled with 25 mcg Fentanyl. Pt intubated on SMIV 5 peep, 50% FIO2, rate 18.  Nitric around 3 parts per million. O2 sats above 95%. Continuous fluids infusing lasix @ 5 mg/hr, amiodarone @ 0.5mg/min, Dobutamine @ 5, propofol @ 30, Epi @ 0.06. Heparin started @ 400 u/hr. Pt received 250cc of albumin x1. Map remains > 70. Pump flow has elevated to average 6.4. Cultures drawn and urinalysis collected. Spouse verbalized understanding. Questions and concerns addressed. No acute events today. Pt progressing toward goals. Will continue to monitor. See flowsheets for full assessment and VS info.      Tayler is calling in regards to the pt asking if Dr. Chang has received the certificate of medical necessity forms. Tayler can be reached at [phone].

## 2019-08-30 NOTE — PROGRESS NOTES
Patient seen today by MATEO MACK. Spoke with Dr. Rhodes. Patient started back on Cardura 8mg daily. Medication detail given to patient.

## 2019-09-04 NOTE — PROCEDURES
TXP SACHI INTERROGATIONS 8/28/2019 7/23/2019 7/23/2019 7/23/2019 7/22/2019 7/22/2019 7/22/2019   Type HeartMate II - - - - - -   Flow 5 - - - - - -   Speed 9800 - - - - - -   PI 5.1 - - - - - -   Power (Jackson) 6.2 - - - - - -   LSL 9400 - - - - - -   Pulsatility No Pulse Intermittent pulse Intermittent pulse Intermittent pulse Intermittent pulse Intermittent pulse Intermittent pulse   }

## 2019-09-04 NOTE — PROGRESS NOTES
"Subjective:   Patient ID:  Tim Richards is a 53 y.o. male who presents for LVAD followup visit.    Implant Date: 3/8/2018  Initials:RBB     Heartmate II RPM 9400  3/9/18 outflow graft changed     INR goal: 2-3   Bridge with Heparin   Antiplatelets: , prevent trial    TXP SACHI INTERROGATIONS 8/28/2019   Type HeartMate II   Flow 5   Speed 9800   PI 5.1   Power (Jackson) 6.2   LSL 9400   Pulsatility No Pulse       HPI  54 yo AAM with DCM, HBP, CHF stage D s/p HM 2 comes for routine visit. Patient was admitted with major GI bleed 07/16/19 through 07/23/19. Arrived with BRBPR to ED, hgb down 7 units prbcs, from 14 to 7, was admitted to ICU.  Patient had major hematochezia, underwent 10 units transfusion, with thrombocytopenia, etc. As well, got heme consult, etc. Had EGD and cscope, demonstrating non bleeding ulcer in tranverse colon and cecal ulcer with clot adhered to the base of the ulcer. There was concern raised for ischemic colitis, we attempted mesenteric u/s however was a poor study. Patient subsequently discharged so he could see his granddaughter in [place]. Discharged home, this is now his first visit back since his admission. He is quite hypertensive today, we had decreased/cut a lot of his medications due to his bleed and have not seen him since then, therefore believe likely his pressure has been running high for some time. Patient states he feels great, denies any IG issues, no other issues in general, kept saying he felt "fantastic." Patient denies N/V/F/C, lightheadedness, dizziness, PND, orthopnea, LE edema, abdominal pain, abdominal pressure, chest pain, chest pressure, syncope, pre-syncope. Additionally patient has no bleeding, no bright red blood per rectum, melena, no GI symptoms at all. NYHA FC I.     07/17/19 TTE:  · LVAD present. Base speed is 9800. The pump type is a Heartmate II.  · Severe left ventricular enlargement.  · Severely decreased left ventricular systolic function. The " estimated ejection fraction is 13%  · Eccentric left ventricular hypertrophy.  · The interventricular septum appears to bow into the right ventricle. The aortic valve does not open.  · Left ventricular diastolic dysfunction.  · Mild right ventricular enlargement.  · Normal right ventricular systolic function.  · Mild mitral regurgitation.  · Normal central venous pressure (3 mm Hg).  · Small pericardial effusion.    Review of Systems   Constitution: Positive for weight gain (gained weight from last time again). Negative for chills, decreased appetite, diaphoresis, fever, malaise/fatigue (improved with increased speed), night sweats and weight loss.   HENT: Negative for congestion, hearing loss, hoarse voice, nosebleeds and sore throat.    Eyes: Negative for blurred vision, double vision, pain, vision loss in left eye, vision loss in right eye and visual disturbance.   Cardiovascular: Negative for chest pain, claudication, dyspnea on exertion, irregular heartbeat, leg swelling, near-syncope, orthopnea, palpitations, paroxysmal nocturnal dyspnea and syncope.   Respiratory: Negative for cough, hemoptysis, shortness of breath, sleep disturbances due to breathing, snoring, sputum production and wheezing.    Endocrine: Negative for cold intolerance, heat intolerance, polydipsia and polyuria.   Hematologic/Lymphatic: Negative for bleeding problem. Does not bruise/bleed easily.   Skin: Negative for poor wound healing and rash.   Musculoskeletal: Negative for arthritis, falls, joint pain, muscle cramps, muscle weakness, myalgias and neck pain.   Gastrointestinal: Negative for bloating, abdominal pain, anorexia, change in bowel habit, constipation, diarrhea, hematemesis, hematochezia, jaundice, melena, nausea and vomiting.   Genitourinary: Negative for bladder incontinence, dysuria, frequency, hematuria, hesitancy, incomplete emptying and urgency.   Neurological: Negative for brief paralysis, difficulty with concentration,  "excessive daytime sleepiness, dizziness, focal weakness, headaches, light-headedness, numbness, paresthesias, seizures and weakness.   Psychiatric/Behavioral: Negative for depression and memory loss. The patient does not have insomnia and is not nervous/anxious.      Objective:BP (!) 110/0 (BP Location: Left arm, Patient Position: Sitting, BP Method: Large (Manual)) Comment: doppler  Pulse 82   Temp 99.2 °F (37.3 °C) (Oral)   Ht 6' 1" (1.854 m)   Wt 122.9 kg (271 lb)   BMI 35.75 kg/m²      Physical Exam   Constitutional: He is oriented to person, place, and time. He appears well-developed and well-nourished. No distress.        HENT:   Head: Normocephalic and atraumatic.   Nose: Nose normal.   Mouth/Throat: Oropharynx is clear and moist. No oropharyngeal exudate.   Eyes: Pupils are equal, round, and reactive to light. Conjunctivae and EOM are normal. No scleral icterus.   Neck: Normal range of motion. Neck supple. No JVD (at clavicle at 90 degrees) present. No tracheal deviation present. No thyromegaly present.   Cardiovascular: Normal rate, regular rhythm, S1 normal, S2 normal and normal heart sounds. Exam reveals no gallop and no friction rub.   No murmur heard.  Normal VAD sounds; DL 1   Pulmonary/Chest: Effort normal and breath sounds normal. No respiratory distress. He has no wheezes. He has no rales. He exhibits no tenderness.   Abdominal: Soft. Bowel sounds are normal. He exhibits no distension and no mass. There is no tenderness. There is no rebound and no guarding.   Musculoskeletal: Normal range of motion. He exhibits no edema or tenderness.   Neurological: He is alert and oriented to person, place, and time. Coordination normal.   Skin: Skin is warm and dry. No rash noted. He is not diaphoretic. No erythema. No pallor.   Psychiatric: He has a normal mood and affect. His behavior is normal. Judgment and thought content normal.   Nursing note and vitals reviewed.    Lab Results   Component Value Date "     (H) 08/28/2019     08/28/2019    K 4.2 08/28/2019    MG 2.4 08/28/2019     08/28/2019    CO2 27 08/28/2019    BUN 16 08/28/2019    CREATININE 1.9 (H) 08/28/2019    GLU 92 08/28/2019    HGBA1C 5.5 03/08/2018    AST 35 08/28/2019    ALT 19 08/28/2019    ALBUMIN 4.5 08/28/2019    PROT 8.1 08/28/2019    BILITOT 0.4 08/28/2019    CHOL 150 11/07/2018    HDL 85 (H) 11/07/2018    LDLCALC 53.0 (L) 11/07/2018    TRIG 60 11/07/2018       Magnesium   Date Value Ref Range Status   08/28/2019 2.4 1.6 - 2.6 mg/dL Final       Lab Results   Component Value Date    WBC 6.40 08/28/2019    HGB 13.5 (L) 08/28/2019    HCT 42.9 08/28/2019    MCV 87 08/28/2019     08/28/2019       Lab Results   Component Value Date    INR 2.7 (H) 08/28/2019    INR 2.1 07/31/2019    INR 1.9 (H) 07/25/2019       BNP   Date Value Ref Range Status   08/28/2019 433 (H) 0 - 99 pg/mL Final     Comment:     Values of less than 100 pg/ml are consistent with non-CHF populations.   07/25/2019 627 (H) 0 - 99 pg/mL Final     Comment:     Values of less than 100 pg/ml are consistent with non-CHF populations.   07/22/2019 312 (H) 0 - 99 pg/mL Final     Comment:     Values of less than 100 pg/ml are consistent with non-CHF populations.       LD   Date Value Ref Range Status   08/28/2019 249 (H) 84 - 195 U/L Final     Comment:     Results are increased in hemolyzed samples.   07/31/2019 300 (A) 80 - 250 U/L Final   07/23/2019 246 110 - 260 U/L Final     Comment:     Results are increased in hemolyzed samples.       Assessment:      1. LVAD (left ventricular assist device) present    2. Examination of participant in clinical trial    3. Chronic systolic heart failure    4. High risk medications (amiodarone) long-term use    5. PVT (paroxysmal ventricular tachycardia)    6. Biventricular implantable cardioverter-defibrillator in situ    7. Dilated cardiomyopathy    8. CKD (chronic kidney disease), stage III    9. Hypertensive  cardiovascular-renal disease, stage 1-4 or unspecified chronic kidney disease, with heart failure      Plan:   Patient doing very well however quite elevated BP today.   Will increase norvasc to 10mg daily from 5mg.  Restart hydralazine at 100mg po TID.   Return to see us for BP check again on Friday after taking his meds in AM.   Recommend 2 gram sodium restriction and 1500cc fluid restriction.  Encourage physical activity with graded exercise program.  Requested patient to weigh themselves daily, and to notify us if their weight increases by more than 3 lbs in 1 day or 5 lbs in 1 week.  Counseled on weight loss, exercise, etc, however not sure he took us seriously.

## 2019-09-05 ENCOUNTER — ANTI-COAG VISIT (OUTPATIENT)
Dept: CARDIOLOGY | Facility: CLINIC | Age: 54
End: 2019-09-05
Payer: COMMERCIAL

## 2019-09-05 DIAGNOSIS — Z79.01 LONG TERM (CURRENT) USE OF ANTICOAGULANTS: ICD-10-CM

## 2019-09-05 DIAGNOSIS — Z95.811 LVAD (LEFT VENTRICULAR ASSIST DEVICE) PRESENT: ICD-10-CM

## 2019-09-05 PROCEDURE — 93793 ANTICOAG MGMT PT WARFARIN: CPT | Mod: S$GLB,,,

## 2019-09-05 PROCEDURE — 93793 PR ANTICOAGULANT MGMT FOR PT TAKING WARFARIN: ICD-10-PCS | Mod: S$GLB,,,

## 2019-09-05 NOTE — PROGRESS NOTES
Pt wife questioned and reports no greens - she stated pt may have missed a dose but she is unsure when . Denies any other changes

## 2019-09-06 ENCOUNTER — CLINICAL SUPPORT (OUTPATIENT)
Dept: TRANSPLANT | Facility: CLINIC | Age: 54
End: 2019-09-06
Payer: COMMERCIAL

## 2019-09-06 VITALS — SYSTOLIC BLOOD PRESSURE: 100 MMHG

## 2019-09-06 PROCEDURE — 99999 PR PBB SHADOW E&M-EST. PATIENT-LVL II: CPT | Mod: PBBFAC,,,

## 2019-09-06 PROCEDURE — 99999 PR PBB SHADOW E&M-EST. PATIENT-LVL II: ICD-10-PCS | Mod: PBBFAC,,,

## 2019-09-06 RX ORDER — DOXAZOSIN 4 MG/1
8 TABLET ORAL DAILY
COMMUNITY
End: 2019-09-06 | Stop reason: DRUGHIGH

## 2019-09-06 RX ORDER — DOXAZOSIN 8 MG/1
8 TABLET ORAL EVERY 12 HOURS
Qty: 60 TABLET | Refills: 11 | Status: ON HOLD | OUTPATIENT
Start: 2019-09-06 | End: 2020-03-13 | Stop reason: SDUPTHER

## 2019-09-06 NOTE — PROGRESS NOTES
Pt presented for B/P check. Doppler 100, no cuff pressure, pt is not pulsatile. Pt explains that his wife took him off of Hydralazine due to a decrease in pt's sexual activity.   Pt confirms that he is taking Cardura 8mg once daily and Norvasc 10mg once daily.    Reviewed with Dr. Rhodes: order to increase Cardura to 8mg BID.

## 2019-09-09 ENCOUNTER — ANTI-COAG VISIT (OUTPATIENT)
Dept: CARDIOLOGY | Facility: CLINIC | Age: 54
End: 2019-09-09
Payer: COMMERCIAL

## 2019-09-09 DIAGNOSIS — Z95.811 LVAD (LEFT VENTRICULAR ASSIST DEVICE) PRESENT: ICD-10-CM

## 2019-09-09 DIAGNOSIS — Z79.01 LONG TERM (CURRENT) USE OF ANTICOAGULANTS: ICD-10-CM

## 2019-09-09 PROCEDURE — 93793 PR ANTICOAGULANT MGMT FOR PT TAKING WARFARIN: ICD-10-PCS | Mod: S$GLB,,,

## 2019-09-09 PROCEDURE — 93793 ANTICOAG MGMT PT WARFARIN: CPT | Mod: S$GLB,,,

## 2019-10-10 ENCOUNTER — ANTI-COAG VISIT (OUTPATIENT)
Dept: CARDIOLOGY | Facility: CLINIC | Age: 54
End: 2019-10-10
Payer: COMMERCIAL

## 2019-10-10 ENCOUNTER — CLINICAL SUPPORT (OUTPATIENT)
Dept: TRANSPLANT | Facility: CLINIC | Age: 54
End: 2019-10-10
Payer: COMMERCIAL

## 2019-10-10 ENCOUNTER — OFFICE VISIT (OUTPATIENT)
Dept: TRANSPLANT | Facility: CLINIC | Age: 54
End: 2019-10-10
Attending: INTERNAL MEDICINE
Payer: COMMERCIAL

## 2019-10-10 ENCOUNTER — DOCUMENTATION ONLY (OUTPATIENT)
Dept: CARDIOTHORACIC SURGERY | Facility: CLINIC | Age: 54
End: 2019-10-10

## 2019-10-10 VITALS — TEMPERATURE: 98 F | SYSTOLIC BLOOD PRESSURE: 86 MMHG | HEIGHT: 73 IN | WEIGHT: 279.88 LBS | BODY MASS INDEX: 37.09 KG/M2

## 2019-10-10 DIAGNOSIS — Z95.811 LVAD (LEFT VENTRICULAR ASSIST DEVICE) PRESENT: ICD-10-CM

## 2019-10-10 DIAGNOSIS — I42.0 DILATED CARDIOMYOPATHY: ICD-10-CM

## 2019-10-10 DIAGNOSIS — I50.42 CHRONIC COMBINED SYSTOLIC AND DIASTOLIC HEART FAILURE: ICD-10-CM

## 2019-10-10 DIAGNOSIS — Z79.01 LONG TERM (CURRENT) USE OF ANTICOAGULANTS: ICD-10-CM

## 2019-10-10 DIAGNOSIS — Z95.811 LVAD (LEFT VENTRICULAR ASSIST DEVICE) PRESENT: Primary | ICD-10-CM

## 2019-10-10 PROCEDURE — 99999 PR PBB SHADOW E&M-EST. PATIENT-LVL I: CPT | Mod: PBBFAC,,,

## 2019-10-10 PROCEDURE — 99214 OFFICE O/P EST MOD 30 MIN: CPT | Mod: S$GLB,,, | Performed by: INTERNAL MEDICINE

## 2019-10-10 PROCEDURE — 93750 INTERROGATION VAD IN PERSON: CPT | Mod: S$GLB,,, | Performed by: INTERNAL MEDICINE

## 2019-10-10 PROCEDURE — 99999 PR PBB SHADOW E&M-EST. PATIENT-LVL III: CPT | Mod: PBBFAC,,, | Performed by: INTERNAL MEDICINE

## 2019-10-10 PROCEDURE — 99999 PR PBB SHADOW E&M-EST. PATIENT-LVL I: ICD-10-PCS | Mod: PBBFAC,,,

## 2019-10-10 PROCEDURE — 93750 OP LVAD INTERROGATION: ICD-10-PCS | Mod: S$GLB,,, | Performed by: INTERNAL MEDICINE

## 2019-10-10 PROCEDURE — 99999 PR PBB SHADOW E&M-EST. PATIENT-LVL III: ICD-10-PCS | Mod: PBBFAC,,, | Performed by: INTERNAL MEDICINE

## 2019-10-10 PROCEDURE — 99214 PR OFFICE/OUTPT VISIT, EST, LEVL IV, 30-39 MIN: ICD-10-PCS | Mod: S$GLB,,, | Performed by: INTERNAL MEDICINE

## 2019-10-10 RX ORDER — TORSEMIDE 20 MG/1
TABLET ORAL
Qty: 30 TABLET | Refills: 11 | Status: SHIPPED | OUTPATIENT
Start: 2019-10-10 | End: 2019-12-05

## 2019-10-10 NOTE — PROGRESS NOTES
"Subjective:   Patient ID:  Tim Richards is a 53 y.o. male who presents for LVAD followup visit.       Implant Date: 3/8/2018     Heartmate II RPM 9800  3/9/18 outflow graft changed     INR goal: 2-3   NO Bridge with Heparin    TXP SACHI INTERROGATIONS 10/10/2019   Type HeartMate II   Flow 7.4   Speed 9800   PI 2.8   Power (Jackson) 7.6   LSL 9400   Pulsatility No Pulse       HPI   Mr. Richards is a very pleasant 54 yo AAM with DCM, HBP, CHF stage D s/p HM 2 comes for routine visit. Patient was admitted with major GI bleed 07/16/19 through 07/23/19. Arrived with BRBPR to ED, hgb down 7 units prbcs, from 14 to 7, was admitted to ICU.  Patient had major hematochezia, underwent 10 units transfusion, with thrombocytopenia, etc. As well, got heme consult, etc. Had EGD and cscope, demonstrating non bleeding ulcer in tranverse colon and cecal ulcer with clot adhered to the base of the ulcer. There was concern raised for ischemic colitis, we attempted mesenteric u/s however was a poor study. Today comes for his regular visit. Reports more COLEMAN and leg swelling. Has been taking extra doses of lasix in addition to his bumex without consulting with us. VAD speed is at 9800 rpm. No VAD alarms noted on interrogation occasional PI events. BP is 86 (Doppler) . DLES is a "1". INR is therapeutic at 2.6. LDH is 228 at baseline. Labs reviewed Creatinine up to 2.1.     Review of Systems   Constitution: Negative. Negative for chills, decreased appetite, diaphoresis, fever, malaise/fatigue, night sweats, weight gain and weight loss.   Eyes: Negative.    Cardiovascular: Negative for chest pain, claudication, cyanosis, dyspnea on exertion, irregular heartbeat, leg swelling, near-syncope, orthopnea, palpitations, paroxysmal nocturnal dyspnea and syncope.   Respiratory: Negative for cough, hemoptysis and shortness of breath.    Endocrine: Negative.    Hematologic/Lymphatic: Negative.    Skin: Negative for color change, dry skin and nail " "changes.   Musculoskeletal: Negative.    Gastrointestinal: Negative.    Genitourinary: Negative.    Neurological: Negative for weakness.       Objective:   Blood pressure (!) 86/0, temperature 98.4 °F (36.9 °C), temperature source Oral, height 6' 1" (1.854 m), weight 127 kg (279 lb 14.1 oz).body mass index is 36.93 kg/m².    Doppler: 86    Physical Exam   Constitutional: He appears well-developed.   BP (!) 86/0   Temp 98.4 °F (36.9 °C) (Oral)   Ht 6' 1" (1.854 m)   Wt 127 kg (279 lb 14.1 oz)   BMI 36.93 kg/m²      HENT:   Head: Normocephalic.   Neck: No JVD present. Carotid bruit is not present.   Cardiovascular: Regular rhythm and normal heart sounds.   No murmur heard.  Smooth VAD hum. DLES is "1"   Pulmonary/Chest: Effort normal and breath sounds normal. No respiratory distress. He has no wheezes. He has no rales.   Abdominal: Soft. Bowel sounds are normal. He exhibits no distension. There is no tenderness. There is no rebound.   Musculoskeletal: He exhibits no edema.   Neurological: He is alert.   Skin: Skin is warm.   Vitals reviewed.      Lab Results   Component Value Date    WBC 5.30 10/10/2019    HGB 12.5 (L) 10/10/2019    HCT 39.2 (L) 10/10/2019    MCV 85 10/10/2019     10/10/2019    CO2 28 10/10/2019    CREATININE 2.1 (H) 10/10/2019    CALCIUM 9.1 10/10/2019    ALBUMIN 4.6 10/10/2019    AST 27 10/10/2019     (H) 10/10/2019    ALT 23 10/10/2019     (H) 10/10/2019       Lab Results   Component Value Date    INR 2.6 (H) 10/10/2019    INR 2.6 (H) 09/09/2019    INR 1.6 (H) 09/05/2019       BNP   Date Value Ref Range Status   10/10/2019 578 (H) 0 - 99 pg/mL Final     Comment:     Values of less than 100 pg/ml are consistent with non-CHF populations.   08/28/2019 433 (H) 0 - 99 pg/mL Final     Comment:     Values of less than 100 pg/ml are consistent with non-CHF populations.   07/25/2019 627 (H) 0 - 99 pg/mL Final     Comment:     Values of less than 100 pg/ml are consistent with non-CHF " populations.       LD   Date Value Ref Range Status   10/10/2019 228 (H) 84 - 195 U/L Final     Comment:     Results are increased in hemolyzed samples.   08/28/2019 249 (H) 84 - 195 U/L Final     Comment:     Results are increased in hemolyzed samples.   07/31/2019 300 (A) 80 - 250 U/L Final         Assessment:      1. LVAD (left ventricular assist device) present    2. Chronic combined systolic and diastolic heart failure    3. Dilated cardiomyopathy        Plan:   Patient is now NYHA class III with evidence of volume of overload.   DC Bumex and start Torsemide 40 mg qam and 10 mg qpm x 3 days then Torsemide 40 mg daily  Echo and Labs next week with nurse visit.   BP is controlled.  INR is therapeutic.  VAD interrogation was performed in clinic  Any VAD management kits dispensed today medically necessary  Recommend 2 gram sodium restriction and 1500cc fluid restriction.  Encourage physical activity with graded exercise program.  Requested patient to weigh themselves daily, and to notify us if their weight increases by more than 3 lbs in 1 day or 5 lbs in 1 week.     Listed for transplant: VEENA MOYA Patient Status  Functional Status: 90% - Able to carry on normal activity: minor symptoms of disease  Physical Capacity: No Limitations  Working for Income: No  If no, reason not working: Demands of Treatment    Guerda Bautista MD

## 2019-10-10 NOTE — PROGRESS NOTES
Pt presented for 18 month follow-up and verbalized consent and willingness to continue to participate with the INTERMACS registry.      Patient completed the EQ-5D quality of life questionnaire, KCCQ, and the Trail Making neurocognitive test in 1 minute and 19 seconds.

## 2019-10-10 NOTE — PROCEDURES
TXP SACHI INTERROGATIONS 10/10/2019 8/28/2019 7/23/2019 7/23/2019 7/23/2019 7/22/2019 7/22/2019   Type HeartMate II HeartMate II - - - - -   Flow 7.4 5 - - - - -   Speed 9800 9800 - - - - -   PI 2.8 5.1 - - - - -   Power (Jackson) 7.6 6.2 - - - - -   LSL 9400 9400 - - - - -   Pulsatility No Pulse No Pulse Intermittent pulse Intermittent pulse Intermittent pulse Intermittent pulse Intermittent pulse   }

## 2019-10-10 NOTE — Clinical Note
October 10, 2019        Nader Chiu  [address]  [address]  [place] [address]  Phone: [phone]  Fax: [phone]             [facility] [address] [place] [address]  Phone: [phone]   Patient: Tim Richards   MR Number: [id number]   YOB: 1965   Date of Visit: 10/10/2019       Dear Dr. Nader Chiu    Thank you for referring Tim Richards to me for evaluation. Attached you will find relevant portions of my assessment and plan of care.    If you have questions, please do not hesitate to call me. I look forward to following Tim Richards along with you.    Sincerely,    Guerda Bautista MD    Enclosure    If you would like to receive this communication electronically, please contact [email or web address] or [phone] to request [employer] Link access.    [employer] Link is a tool which provides read-only access to select patient information with whom you have a relationship. Its easy to use and provides real time access to review your patients record including encounter summaries, notes, results, and demographic information.    If you feel you have received this communication in error or would no longer like to receive these types of communications, please e-mail [email or web address]

## 2019-10-11 NOTE — PROGRESS NOTES
Date of Implant with Heartmate 3 LVAD: 3/8/18    PATIENT ARRIVED IN CLINIC:  Ambulatory   Accompanied by: self    Vitals  Temperature, oral:   Temp Readings from Last 1 Encounters:   10/10/19 98.4 °F (36.9 °C) (Oral)     Blood Pressure:   BP Readings from Last 3 Encounters:   10/10/19 (!) 86/0   19 (!) 100/0   19 (!) 100/0        VAD Interrogation:  TXP SACHI INTERROGATIONS 10/10/2019 2019 2019   Type HeartMate II HeartMate II -   Flow 7.4 5 -   Speed 9800 9800 -   PI 2.8 5.1 -   Power (Jackson) 7.6 6.2 -   LSL 9400 9400 -   Pulsatility No Pulse No Pulse Intermittent pulse       Flow in history: 7s  History [id number]  Problems / Issues / Alarms with VAD if any: None noted  VAD Sounds: HM2 sound Smooth    HCT:   Lab Results   Component Value Date    HCT 39.2 (L) 10/10/2019    HCT 26 (L) 03/10/2018       Complaints/reason for visit today: routine  Emergency Equipment With Patient: yes   VAD Binder With Patient: no   Reviewed VAD Numbers In Binder: no    Any Equipment Issues: None noted (Refer to  note for complete details)    DLES Assessment:  Appearance Of Driveline: 1  Antibiotics: NO  Velour: no  Manual & Visual Inspection Of Driveline: No kinks or tears noted  Stabilization Device In Use: yes, sun securement device      Patient MyChart Questionnaire:   VAD QUESTIONNAIRE ANSWERS 10/8/2019   Have you had any LVAD related issues or alarms? No   Have you had any LVAD Equipment issues? No   How often do you do your LVAD dressing change? Twice per week   What type of dressing change do you do?  Biweekly kit   Do you need dressing change supplies? No   Do you need any new LVAD Accessories? (i.e Battery Holster Vest, Holster Vest, RT/LT Consolidation Bag) No   Have you been using your Monthly Equipment Checklist? Yes   Description of Stools: Normal and formed without blood   How Often: Every other day   Color Of Urine: Clear/Yellow   Are you experiencing: Coping OK?   How  many hours per night are you sleeping? 7   Do you take any medications to help you fall asleep? No   Are you Showering? Yes   Do you change your dressing immediately after you dry off?  Yes   Are you exercising? Yes   If so, what do you do and for how long per day? WALK   How often are you exercising? 6 DAYS A WEEK   Are you working or what do you do to stay active? WORK   Are you driving? Yes   Do you know that if you have an alarm while driving you need to pull over first before looking at your alarm to avoid an accident? Yes   Are you in pain? No   Do you take any medications for pain?  No   What can we do for you? BUMEX NOT WORKING AS WELL / is there another besides Furosemide?   Is your appointment today? No          Labs:    Chemistry        Component Value Date/Time     (L) 10/10/2019 1146    K 3.6 10/10/2019 1146    CL 98 (L) 10/10/2019 1146    CO2 28 10/10/2019 1146    BUN 22 (H) 10/10/2019 1146    CREATININE 2.1 (H) 10/10/2019 1146    GLU 96 10/10/2019 1146        Component Value Date/Time    CALCIUM 9.1 10/10/2019 1146    ALKPHOS 80 10/10/2019 1146    AST 27 10/10/2019 1146    ALT 23 10/10/2019 1146    BILITOT 0.6 10/10/2019 1146    ESTGFRAFRICA 40.6 (A) 10/10/2019 1146    EGFRNONAA 35.1 (A) 10/10/2019 1146            Magnesium   Date Value Ref Range Status   10/10/2019 2.3 1.6 - 2.6 mg/dL Final       Lab Results   Component Value Date    WBC 5.30 10/10/2019    HGB 12.5 (L) 10/10/2019    HCT 39.2 (L) 10/10/2019    MCV 85 10/10/2019     10/10/2019       Lab Results   Component Value Date    INR 2.6 (H) 10/10/2019    INR 2.6 (H) 09/09/2019    INR 1.6 (H) 09/05/2019       BNP   Date Value Ref Range Status   10/10/2019 578 (H) 0 - 99 pg/mL Final     Comment:     Values of less than 100 pg/ml are consistent with non-CHF populations.   08/28/2019 433 (H) 0 - 99 pg/mL Final     Comment:     Values of less than 100 pg/ml are consistent with non-CHF populations.   07/25/2019 627 (H) 0 - 99 pg/mL Final  "    Comment:     Values of less than 100 pg/ml are consistent with non-CHF populations.       LD   Date Value Ref Range Status   10/10/2019 228 (H) 84 - 195 U/L Final     Comment:     Results are increased in hemolyzed samples.   08/28/2019 249 (H) 84 - 195 U/L Final     Comment:     Results are increased in hemolyzed samples.   07/31/2019 300 (A) 80 - 250 U/L Final       Labs reviewed with patient: YES      Patient Satisfaction Survey completed per patient: No  (explained about signature and box to check)  Medication reconciliation: per MA.  Medication Detail updated today: yes  Coumadin Managed by: [facility]    Education: Reviewed driveline care, emergency procedures, how to change the controller, alarms with patient, as well as discussed how to page the VAD coordinator in case of an emergency. It is medically necessary to have VAD management kits in order to prevent infection or to assist in the healing of an infected DLES.     Plans/Needs: Routine f/u. Pt verbalizes that he has been retaining fluid over the past week, along with decreased urination. I reviewed medication with pt and wife (via phone). Pt is taking prescribed Bumex 2mg once daily. Wife admits to giving pt an extra tablet daily over the past few days. Pt admits to also taking "a few extra tablets on my own and taking some Lasix that I had lying around the house." Educated pt on NEVER taking extra medication without first consulting his VAD Coordinator and HTS provider. Creatinine is elevated at 2.1 today.  Reviewed with Dr. Bautista: d/c Bumex. Start Torsemide 40mg daily except for the next 3 days, take 40mg in AM and 20mg in PM. Pt to have labs, echo and nurse visit next week with a 1 month routine f/u.    Hurricane Season: No        "

## 2019-10-12 ENCOUNTER — HOSPITAL ENCOUNTER (INPATIENT)
Facility: HOSPITAL | Age: 54
LOS: 6 days | Discharge: HOME | DRG: 308 | End: 2019-10-18
Attending: EMERGENCY MEDICINE | Admitting: INTERNAL MEDICINE
Payer: COMMERCIAL

## 2019-10-12 DIAGNOSIS — I13.0 HYPERTENSIVE CARDIOVASCULAR-RENAL DISEASE, STAGE 1-4 OR UNSPECIFIED CHRONIC KIDNEY DISEASE, WITH HEART FAILURE: ICD-10-CM

## 2019-10-12 DIAGNOSIS — E66.01 SEVERE OBESITY (BMI 35.0-39.9) WITH COMORBIDITY: ICD-10-CM

## 2019-10-12 DIAGNOSIS — I47.20 VT (VENTRICULAR TACHYCARDIA): Primary | ICD-10-CM

## 2019-10-12 DIAGNOSIS — I50.9 HEART FAILURE: ICD-10-CM

## 2019-10-12 DIAGNOSIS — Z95.810 BIVENTRICULAR IMPLANTABLE CARDIOVERTER-DEFIBRILLATOR IN SITU: ICD-10-CM

## 2019-10-12 DIAGNOSIS — I10 ESSENTIAL HYPERTENSION: ICD-10-CM

## 2019-10-12 DIAGNOSIS — I50.43 ACUTE ON CHRONIC COMBINED SYSTOLIC AND DIASTOLIC CONGESTIVE HEART FAILURE: ICD-10-CM

## 2019-10-12 DIAGNOSIS — Z95.811 LVAD (LEFT VENTRICULAR ASSIST DEVICE) PRESENT: ICD-10-CM

## 2019-10-12 DIAGNOSIS — I42.0 DCM (DILATED CARDIOMYOPATHY): ICD-10-CM

## 2019-10-12 DIAGNOSIS — N18.30 CKD (CHRONIC KIDNEY DISEASE), STAGE III: ICD-10-CM

## 2019-10-12 DIAGNOSIS — Z45.02 DEFIBRILLATOR DISCHARGE: ICD-10-CM

## 2019-10-12 DIAGNOSIS — I49.01 VENTRICULAR FIBRILLATION: ICD-10-CM

## 2019-10-12 DIAGNOSIS — R06.02 SHORTNESS OF BREATH: ICD-10-CM

## 2019-10-12 DIAGNOSIS — I47.20 VENTRICULAR TACHYCARDIA: ICD-10-CM

## 2019-10-12 LAB
ALBUMIN SERPL BCP-MCNC: 4.2 G/DL (ref 3.5–5.2)
ALP SERPL-CCNC: 92 U/L (ref 55–135)
ALT SERPL W/O P-5'-P-CCNC: 21 U/L (ref 10–44)
ANION GAP SERPL CALC-SCNC: 13 MMOL/L (ref 8–16)
ANION GAP SERPL CALC-SCNC: 9 MMOL/L (ref 8–16)
ANION GAP SERPL CALC-SCNC: 9 MMOL/L (ref 8–16)
APTT BLDCRRT: 42.8 SEC (ref 21–32)
AST SERPL-CCNC: 26 U/L (ref 10–40)
BASOPHILS # BLD AUTO: 0.01 K/UL (ref 0–0.2)
BASOPHILS # BLD AUTO: 0.02 K/UL (ref 0–0.2)
BASOPHILS NFR BLD: 0.2 % (ref 0–1.9)
BASOPHILS NFR BLD: 0.2 % (ref 0–1.9)
BILIRUB SERPL-MCNC: 0.5 MG/DL (ref 0.1–1)
BNP SERPL-MCNC: 809 PG/ML (ref 0–99)
BUN SERPL-MCNC: 21 MG/DL (ref 6–20)
BUN SERPL-MCNC: 22 MG/DL (ref 6–20)
BUN SERPL-MCNC: 22 MG/DL (ref 6–20)
CALCIUM SERPL-MCNC: 9.3 MG/DL (ref 8.7–10.5)
CALCIUM SERPL-MCNC: 9.4 MG/DL (ref 8.7–10.5)
CALCIUM SERPL-MCNC: 9.7 MG/DL (ref 8.7–10.5)
CHLORIDE SERPL-SCNC: 100 MMOL/L (ref 95–110)
CHLORIDE SERPL-SCNC: 99 MMOL/L (ref 95–110)
CHLORIDE SERPL-SCNC: 99 MMOL/L (ref 95–110)
CO2 SERPL-SCNC: 24 MMOL/L (ref 23–29)
CO2 SERPL-SCNC: 25 MMOL/L (ref 23–29)
CO2 SERPL-SCNC: 28 MMOL/L (ref 23–29)
CREAT SERPL-MCNC: 2.4 MG/DL (ref 0.5–1.4)
CREAT SERPL-MCNC: 2.5 MG/DL (ref 0.5–1.4)
CREAT SERPL-MCNC: 2.5 MG/DL (ref 0.5–1.4)
DIFFERENTIAL METHOD: ABNORMAL
DIFFERENTIAL METHOD: ABNORMAL
EOSINOPHIL # BLD AUTO: 0 K/UL (ref 0–0.5)
EOSINOPHIL # BLD AUTO: 0 K/UL (ref 0–0.5)
EOSINOPHIL NFR BLD: 0 % (ref 0–8)
EOSINOPHIL NFR BLD: 0.2 % (ref 0–8)
ERYTHROCYTE [DISTWIDTH] IN BLOOD BY AUTOMATED COUNT: 15.1 % (ref 11.5–14.5)
ERYTHROCYTE [DISTWIDTH] IN BLOOD BY AUTOMATED COUNT: 15.2 % (ref 11.5–14.5)
EST. GFR  (AFRICAN AMERICAN): 32.9 ML/MIN/1.73 M^2
EST. GFR  (AFRICAN AMERICAN): 32.9 ML/MIN/1.73 M^2
EST. GFR  (AFRICAN AMERICAN): 34.6 ML/MIN/1.73 M^2
EST. GFR  (NON AFRICAN AMERICAN): 28.5 ML/MIN/1.73 M^2
EST. GFR  (NON AFRICAN AMERICAN): 28.5 ML/MIN/1.73 M^2
EST. GFR  (NON AFRICAN AMERICAN): 29.9 ML/MIN/1.73 M^2
ETHANOL SERPL-MCNC: <10 MG/DL
GLUCOSE SERPL-MCNC: 119 MG/DL (ref 70–110)
GLUCOSE SERPL-MCNC: 142 MG/DL (ref 70–110)
GLUCOSE SERPL-MCNC: 147 MG/DL (ref 70–110)
HCT VFR BLD AUTO: 40.1 % (ref 40–54)
HCT VFR BLD AUTO: 41.5 % (ref 40–54)
HGB BLD-MCNC: 12.4 G/DL (ref 14–18)
HGB BLD-MCNC: 12.7 G/DL (ref 14–18)
IMM GRANULOCYTES # BLD AUTO: 0.02 K/UL (ref 0–0.04)
IMM GRANULOCYTES # BLD AUTO: 0.03 K/UL (ref 0–0.04)
IMM GRANULOCYTES NFR BLD AUTO: 0.2 % (ref 0–0.5)
IMM GRANULOCYTES NFR BLD AUTO: 0.5 % (ref 0–0.5)
INR PPP: 3.7 (ref 0.8–1.2)
INR PPP: 3.8 (ref 0.8–1.2)
LACTATE SERPL-SCNC: 2.3 MMOL/L (ref 0.5–2.2)
LDH SERPL L TO P-CCNC: 302 U/L (ref 110–260)
LYMPHOCYTES # BLD AUTO: 0.3 K/UL (ref 1–4.8)
LYMPHOCYTES # BLD AUTO: 0.6 K/UL (ref 1–4.8)
LYMPHOCYTES NFR BLD: 3.4 % (ref 18–48)
LYMPHOCYTES NFR BLD: 9.3 % (ref 18–48)
MAGNESIUM SERPL-MCNC: 2.6 MG/DL (ref 1.6–2.6)
MAGNESIUM SERPL-MCNC: 3.4 MG/DL (ref 1.6–2.6)
MCH RBC QN AUTO: 26.2 PG (ref 27–31)
MCH RBC QN AUTO: 27.3 PG (ref 27–31)
MCHC RBC AUTO-ENTMCNC: 30.6 G/DL (ref 32–36)
MCHC RBC AUTO-ENTMCNC: 30.9 G/DL (ref 32–36)
MCV RBC AUTO: 86 FL (ref 82–98)
MCV RBC AUTO: 88 FL (ref 82–98)
MONOCYTES # BLD AUTO: 0.6 K/UL (ref 0.3–1)
MONOCYTES # BLD AUTO: 1 K/UL (ref 0.3–1)
MONOCYTES NFR BLD: 10.1 % (ref 4–15)
MONOCYTES NFR BLD: 12.5 % (ref 4–15)
NEUTROPHILS # BLD AUTO: 4.9 K/UL (ref 1.8–7.7)
NEUTROPHILS # BLD AUTO: 6.8 K/UL (ref 1.8–7.7)
NEUTROPHILS NFR BLD: 79.7 % (ref 38–73)
NEUTROPHILS NFR BLD: 83.7 % (ref 38–73)
NRBC BLD-RTO: 0 /100 WBC
NRBC BLD-RTO: 0 /100 WBC
PHOSPHATE SERPL-MCNC: 2.9 MG/DL (ref 2.7–4.5)
PLATELET # BLD AUTO: 200 K/UL (ref 150–350)
PLATELET # BLD AUTO: 211 K/UL (ref 150–350)
PMV BLD AUTO: 10.8 FL (ref 9.2–12.9)
PMV BLD AUTO: 10.9 FL (ref 9.2–12.9)
POTASSIUM SERPL-SCNC: 3.9 MMOL/L (ref 3.5–5.1)
POTASSIUM SERPL-SCNC: 4 MMOL/L (ref 3.5–5.1)
POTASSIUM SERPL-SCNC: 4.4 MMOL/L (ref 3.5–5.1)
PROT SERPL-MCNC: 7.9 G/DL (ref 6–8.4)
PROTHROMBIN TIME: 36.3 SEC (ref 9–12.5)
PROTHROMBIN TIME: 37.4 SEC (ref 9–12.5)
RBC # BLD AUTO: 4.54 M/UL (ref 4.6–6.2)
RBC # BLD AUTO: 4.85 M/UL (ref 4.6–6.2)
SODIUM SERPL-SCNC: 134 MMOL/L (ref 136–145)
SODIUM SERPL-SCNC: 136 MMOL/L (ref 136–145)
SODIUM SERPL-SCNC: 136 MMOL/L (ref 136–145)
TROPONIN I SERPL DL<=0.01 NG/ML-MCNC: 0.08 NG/ML (ref 0–0.03)
WBC # BLD AUTO: 6.14 K/UL (ref 3.9–12.7)
WBC # BLD AUTO: 8.15 K/UL (ref 3.9–12.7)

## 2019-10-12 PROCEDURE — 93640: ICD-10-PCS | Mod: 26,,, | Performed by: INTERNAL MEDICINE

## 2019-10-12 PROCEDURE — 93640 EP EVAL 1/2CHMBR PACG CVDFB: CPT | Mod: 26,,, | Performed by: INTERNAL MEDICINE

## 2019-10-12 PROCEDURE — 99223 PR INITIAL HOSPITAL CARE,LEVL III: ICD-10-PCS | Mod: 25,AI,, | Performed by: INTERNAL MEDICINE

## 2019-10-12 PROCEDURE — 93005 ELECTROCARDIOGRAM TRACING: CPT

## 2019-10-12 PROCEDURE — 25000003 PHARM REV CODE 250: Performed by: STUDENT IN AN ORGANIZED HEALTH CARE EDUCATION/TRAINING PROGRAM

## 2019-10-12 PROCEDURE — 99291 PR CRITICAL CARE, E/M 30-74 MINUTES: ICD-10-PCS | Mod: ,,, | Performed by: EMERGENCY MEDICINE

## 2019-10-12 PROCEDURE — 85025 COMPLETE CBC W/AUTO DIFF WBC: CPT | Mod: 91

## 2019-10-12 PROCEDURE — 83615 LACTATE (LD) (LDH) ENZYME: CPT

## 2019-10-12 PROCEDURE — 84484 ASSAY OF TROPONIN QUANT: CPT

## 2019-10-12 PROCEDURE — 80048 BASIC METABOLIC PNL TOTAL CA: CPT

## 2019-10-12 PROCEDURE — 63600175 PHARM REV CODE 636 W HCPCS: Performed by: EMERGENCY MEDICINE

## 2019-10-12 PROCEDURE — 85610 PROTHROMBIN TIME: CPT

## 2019-10-12 PROCEDURE — 83735 ASSAY OF MAGNESIUM: CPT

## 2019-10-12 PROCEDURE — 93010 ELECTROCARDIOGRAM REPORT: CPT | Mod: ,,, | Performed by: INTERNAL MEDICINE

## 2019-10-12 PROCEDURE — 83735 ASSAY OF MAGNESIUM: CPT | Mod: 91

## 2019-10-12 PROCEDURE — 99223 1ST HOSP IP/OBS HIGH 75: CPT | Mod: 25,AI,, | Performed by: INTERNAL MEDICINE

## 2019-10-12 PROCEDURE — 99291 CRITICAL CARE FIRST HOUR: CPT | Mod: 25

## 2019-10-12 PROCEDURE — 80053 COMPREHEN METABOLIC PANEL: CPT

## 2019-10-12 PROCEDURE — 99223 PR INITIAL HOSPITAL CARE,LEVL III: ICD-10-PCS | Mod: ,,, | Performed by: INTERNAL MEDICINE

## 2019-10-12 PROCEDURE — 27000248 HC VAD-ADDITIONAL DAY

## 2019-10-12 PROCEDURE — 85610 PROTHROMBIN TIME: CPT | Mod: 91

## 2019-10-12 PROCEDURE — 85730 THROMBOPLASTIN TIME PARTIAL: CPT

## 2019-10-12 PROCEDURE — 20000000 HC ICU ROOM

## 2019-10-12 PROCEDURE — 83605 ASSAY OF LACTIC ACID: CPT

## 2019-10-12 PROCEDURE — 84100 ASSAY OF PHOSPHORUS: CPT

## 2019-10-12 PROCEDURE — 96375 TX/PRO/DX INJ NEW DRUG ADDON: CPT | Mod: 59

## 2019-10-12 PROCEDURE — 63600175 PHARM REV CODE 636 W HCPCS

## 2019-10-12 PROCEDURE — 99223 1ST HOSP IP/OBS HIGH 75: CPT | Mod: ,,, | Performed by: INTERNAL MEDICINE

## 2019-10-12 PROCEDURE — 80048 BASIC METABOLIC PNL TOTAL CA: CPT | Mod: 91

## 2019-10-12 PROCEDURE — 99291 CRITICAL CARE FIRST HOUR: CPT | Mod: ,,, | Performed by: EMERGENCY MEDICINE

## 2019-10-12 PROCEDURE — 83880 ASSAY OF NATRIURETIC PEPTIDE: CPT

## 2019-10-12 PROCEDURE — 96365 THER/PROPH/DIAG IV INF INIT: CPT | Mod: 59

## 2019-10-12 PROCEDURE — 63600175 PHARM REV CODE 636 W HCPCS: Performed by: STUDENT IN AN ORGANIZED HEALTH CARE EDUCATION/TRAINING PROGRAM

## 2019-10-12 PROCEDURE — 93010 EKG 12-LEAD: ICD-10-PCS | Mod: ,,, | Performed by: INTERNAL MEDICINE

## 2019-10-12 PROCEDURE — 80320 DRUG SCREEN QUANTALCOHOLS: CPT

## 2019-10-12 RX ORDER — MIDAZOLAM HYDROCHLORIDE 1 MG/ML
2 INJECTION INTRAMUSCULAR; INTRAVENOUS
Status: COMPLETED | OUTPATIENT
Start: 2019-10-12 | End: 2019-10-12

## 2019-10-12 RX ORDER — SODIUM CHLORIDE 0.9 % (FLUSH) 0.9 %
10 SYRINGE (ML) INJECTION
Status: DISCONTINUED | OUTPATIENT
Start: 2019-10-12 | End: 2019-10-18 | Stop reason: HOSPADM

## 2019-10-12 RX ORDER — LANOLIN ALCOHOL/MO/W.PET/CERES
400 CREAM (GRAM) TOPICAL 3 TIMES DAILY
Status: DISCONTINUED | OUTPATIENT
Start: 2019-10-12 | End: 2019-10-18 | Stop reason: HOSPADM

## 2019-10-12 RX ORDER — PANTOPRAZOLE SODIUM 40 MG/1
40 TABLET, DELAYED RELEASE ORAL DAILY
Status: DISCONTINUED | OUTPATIENT
Start: 2019-10-13 | End: 2019-10-18 | Stop reason: HOSPADM

## 2019-10-12 RX ORDER — FUROSEMIDE 10 MG/ML
80 INJECTION INTRAMUSCULAR; INTRAVENOUS
Status: COMPLETED | OUTPATIENT
Start: 2019-10-12 | End: 2019-10-12

## 2019-10-12 RX ORDER — AMLODIPINE BESYLATE 10 MG/1
10 TABLET ORAL DAILY
Status: DISCONTINUED | OUTPATIENT
Start: 2019-10-13 | End: 2019-10-18 | Stop reason: HOSPADM

## 2019-10-12 RX ORDER — AMIODARONE HYDROCHLORIDE 150 MG/3ML
150 INJECTION, SOLUTION INTRAVENOUS
Status: COMPLETED | OUTPATIENT
Start: 2019-10-12 | End: 2019-10-12

## 2019-10-12 RX ORDER — FENTANYL CITRATE 50 UG/ML
50 INJECTION, SOLUTION INTRAMUSCULAR; INTRAVENOUS
Status: COMPLETED | OUTPATIENT
Start: 2019-10-12 | End: 2019-10-12

## 2019-10-12 RX ORDER — POTASSIUM CHLORIDE 20 MEQ/1
20 TABLET, EXTENDED RELEASE ORAL 2 TIMES DAILY
Status: DISCONTINUED | OUTPATIENT
Start: 2019-10-12 | End: 2019-10-14

## 2019-10-12 RX ORDER — LIDOCAINE HCL/PF 100 MG/5ML
100 SYRINGE (ML) INTRAVENOUS
Status: COMPLETED | OUTPATIENT
Start: 2019-10-12 | End: 2019-10-12

## 2019-10-12 RX ORDER — FENTANYL CITRATE 50 UG/ML
INJECTION, SOLUTION INTRAMUSCULAR; INTRAVENOUS
Status: COMPLETED
Start: 2019-10-12 | End: 2019-10-12

## 2019-10-12 RX ORDER — MIDAZOLAM HYDROCHLORIDE 1 MG/ML
INJECTION INTRAMUSCULAR; INTRAVENOUS
Status: COMPLETED
Start: 2019-10-12 | End: 2019-10-12

## 2019-10-12 RX ORDER — MAGNESIUM SULFATE HEPTAHYDRATE 40 MG/ML
2 INJECTION, SOLUTION INTRAVENOUS
Status: COMPLETED | OUTPATIENT
Start: 2019-10-12 | End: 2019-10-12

## 2019-10-12 RX ORDER — AMIODARONE HYDROCHLORIDE 150 MG/3ML
INJECTION, SOLUTION INTRAVENOUS
Status: COMPLETED
Start: 2019-10-12 | End: 2019-10-12

## 2019-10-12 RX ORDER — FENTANYL CITRATE 50 UG/ML
50 INJECTION, SOLUTION INTRAMUSCULAR; INTRAVENOUS
Status: DISCONTINUED | OUTPATIENT
Start: 2019-10-12 | End: 2019-10-12

## 2019-10-12 RX ORDER — FERROUS GLUCONATE 324(37.5)
324 TABLET ORAL
Status: DISCONTINUED | OUTPATIENT
Start: 2019-10-13 | End: 2019-10-18 | Stop reason: HOSPADM

## 2019-10-12 RX ORDER — FENTANYL CITRATE 50 UG/ML
INJECTION, SOLUTION INTRAMUSCULAR; INTRAVENOUS
Status: DISPENSED
Start: 2019-10-12 | End: 2019-10-13

## 2019-10-12 RX ORDER — MIDAZOLAM HYDROCHLORIDE 1 MG/ML
INJECTION INTRAMUSCULAR; INTRAVENOUS
Status: DISPENSED
Start: 2019-10-12 | End: 2019-10-13

## 2019-10-12 RX ADMIN — FENTANYL CITRATE 100 MCG: 50 INJECTION INTRAMUSCULAR; INTRAVENOUS at 02:10

## 2019-10-12 RX ADMIN — LIDOCAINE HYDROCHLORIDE 100 MG: 20 INJECTION INTRAVENOUS at 02:10

## 2019-10-12 RX ADMIN — MIDAZOLAM HYDROCHLORIDE 2 MG: 1 INJECTION, SOLUTION INTRAMUSCULAR; INTRAVENOUS at 02:10

## 2019-10-12 RX ADMIN — AMIODARONE HYDROCHLORIDE 360 MG: 1.8 INJECTION, SOLUTION INTRAVENOUS at 02:10

## 2019-10-12 RX ADMIN — AMIODARONE HYDROCHLORIDE 1 MG/MIN: 1.8 INJECTION, SOLUTION INTRAVENOUS at 08:10

## 2019-10-12 RX ADMIN — POTASSIUM CHLORIDE 20 MEQ: 20 TABLET, EXTENDED RELEASE ORAL at 10:10

## 2019-10-12 RX ADMIN — AMIODARONE HYDROCHLORIDE 150 MG: 50 INJECTION, SOLUTION INTRAVENOUS at 02:10

## 2019-10-12 RX ADMIN — MESALAMINE 1000 MG: 250 CAPSULE ORAL at 06:10

## 2019-10-12 RX ADMIN — MAGNESIUM OXIDE TAB 400 MG (241.3 MG ELEMENTAL MG) 400 MG: 400 (241.3 MG) TAB at 10:10

## 2019-10-12 RX ADMIN — FUROSEMIDE 40 MG/HR: 10 INJECTION, SOLUTION INTRAMUSCULAR; INTRAVENOUS at 08:10

## 2019-10-12 RX ADMIN — FUROSEMIDE 80 MG: 10 INJECTION, SOLUTION INTRAMUSCULAR; INTRAVENOUS at 03:10

## 2019-10-12 RX ADMIN — MIDAZOLAM HYDROCHLORIDE 2 MG: 1 INJECTION INTRAMUSCULAR; INTRAVENOUS at 02:10

## 2019-10-12 RX ADMIN — FUROSEMIDE 40 MG/HR: 10 INJECTION, SOLUTION INTRAMUSCULAR; INTRAVENOUS at 04:10

## 2019-10-12 RX ADMIN — AMIODARONE HYDROCHLORIDE 150 MG: 150 INJECTION, SOLUTION INTRAVENOUS at 02:10

## 2019-10-12 RX ADMIN — MAGNESIUM SULFATE IN WATER 2 G: 40 INJECTION, SOLUTION INTRAVENOUS at 02:10

## 2019-10-12 RX ADMIN — FENTANYL CITRATE 50 MCG: 50 INJECTION INTRAMUSCULAR; INTRAVENOUS at 02:10
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
## 2019-10-12 NOTE — ED NOTES
Echo complete; patient resting in bed. Wife sitting at the bedside. Food tray set up for patient. Continuous pulse oximetry, BP, and cardiac monitoring in place. Call bell within reach. Will continue to monitor.

## 2019-10-12 NOTE — H&P
"[facility]  Interventional Cardiology  H&P    Patient Name: Tim Richards  MRN: [id number]  Admission Date: 10/12/2019  Code Status: Full Code   Attending Provider: Brittny Mendieta, *   Primary Care Physician: Ja Méndez MD  Principal Problem:<principal problem not specified>    Patient information was obtained from patient and ER records.     Subjective:     Chief Complaint:       HPI:  Tim Richards is a 53 y.o. male who presents with ICD fire x5. Cardiology consulted for further evaluation. This is a 52 yo AAM with DCM/ combined HF stage D (EF 10%, grade III DD) s/p HM II (9800) with Outflow graft changed (03/9/18), BiV-IcD Medtronic, HtN, Tobacco use, obesity, Gout who was recently discharge (07/16/19 - 07/23/19) with acute blood loss anemia in the setting of GI bleed s/p 10u transfused, non bleeding ulcer Colon/Cecal ulcer. Presents with ICD fire x 5. Patient seen and examined spouse at bedside, state over the past couple of weeks had fluid built up, now with SOB / COLEMAN / Le edema NYHA class III symptoms due to dietary noncompliance. He started to take Lasix 20mg PRN in addition to Bumex 2mg qday and Aldacontone without much improvement. See by Dr. Bautista in clinic (see below), started on Torsemide which did not help with UOP and continued to have worsening SOB / le edema. This morning presented with ICD fire x 5 otherwise having right sided shoulder pain. Bedside TTE demonstrated EF >10%, AV does not open, IVF dilated 2.5cm with respiratory variation >50%, Dilated RV / LV. Medtronic ICD interrogated noted underlying rhythm VF, not able to interrogate most recent therapy as "episode still recording". Case discussed with EP team, recommended external defib. Dr. Hadley updated and seen patient at bedside. Code chart brought to ED, pads placed, given amiodarone 150mg x2, Lidocaine 100mg, Magnesium 2mg, Versed 2mg x2 and Fentanyl 50mg x2 s/p unsynchronized DCCV 120J, which " "successfully converted him into AS- rhythm. End tidal CO2 placed. Spouse updated.     Vitals stable.   Cr worsening 2.5 (BL 1.7)   (Bl <500)    Lactic 2.3   LFTs wnl  CBC wnl  INR 3.8    Seen by Dr. Bautista in clinic on 10/10/19, reported pre-syncopal symptoms, felt weak, lightheaded. Along with increasing COLEMAN, LE swelling, noncompliant with dietary intake due to holidays with 15 lbs weigh gain, consistent with NYHA class III symptoms, needing to increase diuretics Taking Lasix in addition to his Bumex. In clinic, Cr elevated 2.1 (BL ~1.7), Bumex dc'd started on Torsemide 40mg qday with 10mg qpm x 3 days followed by Torsemide 40mg qday, DLES is a "1".     Implant Date: 3/8/2018  Heartmate II RPM 9800  3/9/18 outflow graft changed  INR goal: 2-3   NO Bridge with Heparin             Past Medical History:   Diagnosis Date    CHF (congestive heart failure)     Dilated cardiomyopathy 1/10/2018    Disorder of kidney and ureter     CKD    Gout     HTN (hypertension)     Ventricular tachycardia (paroxysmal)        Past Surgical History:   Procedure Laterality Date    CARDIAC CATHETERIZATION  Dec. 2012    CARDIAC DEFIBRILLATOR PLACEMENT Left     CRRT-D    COLONOSCOPY N/A 3/6/2018    Procedure: COLONOSCOPY;  Surgeon: Alonso Bone MD;  Location: [address]);  Service: Endoscopy;  Laterality: N/A;    COLONOSCOPY N/A 7/17/2019    Procedure: COLONOSCOPY;  Surgeon: Blane Valdez MD;  Location: [address]);  Service: Endoscopy;  Laterality: N/A;    COLONOSCOPY N/A 7/18/2019    Procedure: COLONOSCOPY;  Surgeon: Blane Valdez MD;  Location: [address]);  Service: Endoscopy;  Laterality: N/A;    ESOPHAGOGASTRODUODENOSCOPY N/A 7/17/2019    Procedure: EGD (ESOPHAGOGASTRODUODENOSCOPY);  Surgeon: Blane Valdez MD;  Location: [address]);  Service: Endoscopy;  Laterality: N/A;    ESOPHAGOGASTRODUODENOSCOPY N/A 7/18/2019    Procedure: EGD (ESOPHAGOGASTRODUODENOSCOPY);  Surgeon: Blane Valdez" MD;  Location: [address]);  Service: Endoscopy;  Laterality: N/A;       Review of patient's allergies indicates:   Allergen Reactions    Lisinopril Anaphylaxis         (Not in a hospital admission)  Family History     Problem Relation (Age of Onset)    Cancer Sister (54)    Coronary artery disease Father    Diabetes Father    Heart disease Father    Hypertension Father    No Known Problems Mother, Brother        Tobacco Use    Smoking status: Former Smoker     Packs/day: 1.00     Years: 31.00     Pack years: 31.00     Types: Cigarettes     Last attempt to quit: 2018     Years since quittin.7    Smokeless tobacco: Never Used    Tobacco comment: pt is quiting on his own - pt stated not qualified for program;  pt  quit on his own   Substance and Sexual Activity    Alcohol use: No     Alcohol/week: 0.0 standard drinks     Comment: one fifth of gin or rum/week    Drug use: No    Sexual activity: Yes     Partners: Female     Birth control/protection: None     Comment: 10/5/17  with same partner 7 years      Review of Systems   Constitution: Positive for malaise/fatigue and weight gain. Negative for chills, decreased appetite, diaphoresis, fever and weight loss.   Eyes: Negative for blurred vision.   Cardiovascular: Positive for chest pain, leg swelling and palpitations. Negative for dyspnea on exertion, irregular heartbeat, near-syncope and orthopnea.   Respiratory: Positive for shortness of breath. Negative for cough, snoring and wheezing.    Gastrointestinal: Negative for abdominal pain, nausea and vomiting.   Genitourinary: Negative for bladder incontinence and urgency.     Objective:     Vital Signs (Most Recent):  Temp: 98.8 °F (37.1 °C) (10/12/19 1237)  Pulse: 76 (10/12/19 1609)  Resp: 18 (10/12/19 1511)  BP: (!) 76/0 (10/12/19 1346)  SpO2: 100 % (10/12/19 1609) Vital Signs (24h Range):  Temp:  [98.8 °F (37.1 °C)] 98.8 °F (37.1 °C)  Pulse:  [76-96] 76  Resp:  [16-20] 18  SpO2:   [91 %-100 %] 100 %  BP: (76)/(0) 76/0     Weight: 126.8 kg (279 lb 8.7 oz)  Body mass index is 36.88 kg/m².    SpO2: 100 %  O2 Device (Oxygen Therapy): room air      Intake/Output Summary (Last 24 hours) at 10/12/2019 1635  Last data filed at 10/12/2019 1615  Gross per 24 hour   Intake 50 ml   Output --   Net 50 ml       Lines/Drains/Airways     Line                 VAD 03/08/18 1124 Left ventricular assist device HeartMate  days          Peripheral Intravenous Line                 Peripheral IV - Single Lumen 10/12/19 1255 18 G Right Forearm less than 1 day         Peripheral IV - Single Lumen 10/12/19 1441 22 G Right Hand less than 1 day                Physical Exam   Constitutional: He is oriented to person, place, and time. He appears well-developed and well-nourished. He appears distressed.       Neck: JVD present.   Cardiovascular: Normal rate, regular rhythm, normal heart sounds and intact distal pulses. Exam reveals no gallop and no friction rub.   No murmur heard.  Pulmonary/Chest: He is in respiratory distress. He has no wheezes. He has rales. He exhibits no tenderness.   Abdominal: Soft. Bowel sounds are normal. He exhibits no distension and no mass. There is no tenderness. There is no rebound and no guarding.   Musculoskeletal: Normal range of motion. He exhibits edema.   Neurological: He is alert and oriented to person, place, and time.   Skin: Skin is warm. He is diaphoretic. No erythema.   Nursing note and vitals reviewed.      Significant Labs:   CMP   Recent Labs   Lab 10/12/19  1254 10/12/19  1539    134*   K 4.0 4.4   CL 99 100   CO2 24 25   * 119*   BUN 21* 22*   CREATININE 2.5* 2.4*   CALCIUM 9.7 9.3   PROT 7.9  --    ALBUMIN 4.2  --    BILITOT 0.5  --    ALKPHOS 92  --    AST 26  --    ALT 21  --    ANIONGAP 13 9   ESTGFRAFRICA 32.9* 34.6*   EGFRNONAA 28.5* 29.9*   , CBC   Recent Labs   Lab 10/12/19  1254 10/12/19  1539   WBC 6.14 8.15   HGB 12.7* 12.4*   HCT 41.5 40.1   PLT  200 211   , INR   Recent Labs   Lab 10/12/19  1254 10/12/19  1539   INR 3.7* 3.8*    and Lipid Panel No results for input(s): CHOL, HDL, LDLCALC, TRIG, CHOLHDL in the last 48 hours.    Significant Imaging: Echocardiogram:   Transthoracic echo (TTE) complete (Cupid Only):   Results for orders placed or performed during the hospital encounter of 07/16/19   Transthoracic echo (TTE) 2D with Color Flow   Result Value Ref Range    Ascending aorta 3.36 cm    STJ 2.91 cm    IVS 0.86 0.6 - 1.1 cm    LA size 4.05 cm    Left Atrium Major Axis 5.77 cm    Left Atrium Minor Axis 5.89 cm    LVIDD 7.23 (A) 3.5 - 6.0 cm    LVIDS 6.70 (A) 2.1 - 4.0 cm    LVOT diameter 2.06 cm    PW 1.14 (A) 0.6 - 1.1 cm    RA Major Axis 4.53 cm    RA Width 3.41 cm    RVDD 4.36 cm    Sinus 3.40 cm    TAPSE 2.26 cm    TDI LATERAL 0.04 m/s    LA WIDTH 3.92 cm    LV Diastolic Volume 274.38 mL    LV Systolic Volume 231.48 mL    FS 7 %    LA volume 78.67 cm3    LV mass 340.39 g    Left Ventricle Relative Wall Thickness 0.32 cm    LVOT area 3.3 cm2    LV Systolic Volume Index 97.2 mL/m2    LV Diastolic Volume Index 115.20 mL/m2    LA Volume Index 33.0 mL/m2    LV Mass Index 143 g/m2    BSA 2.45 m2    Right Atrial Pressure (from IVC) 3 mmHg    Narrative    · LVAD present. Base speed is 9800. The pump type is a Heartmate II.  · Severe left ventricular enlargement.  · Severely decreased left ventricular systolic function. The estimated   ejection fraction is 13%  · Eccentric left ventricular hypertrophy.  · The interventricular septum appears to bow into the right ventricle. The   aortic valve does not open.  · Left ventricular diastolic dysfunction.  · Mild right ventricular enlargement.  · Normal right ventricular systolic function.  · Mild mitral regurgitation.  · Normal central venous pressure (3 mm Hg).  · Small pericardial effusion.        Assessment and Plan:     LVAD (left ventricular assist device) present  Tim Richards is a 53 y.o. male who  presented with MMVT s/p ATP which degenerated into VF s/p ICD fire x6 all of them unsuccessful in the setting of ADHF. Hx of DCM/ combined HF stage D (EF 10%, grade III DD) s/p HM II (9800) with Outflow graft changed (03/9/18) as DT, BiV-IcD Medtronic, HtN, Tobacco use, obesity, Gout who was recently discharge (07/16/19 - 07/23/19) with acute blood loss anemia in the setting of GI bleed s/p 10u transfused, non bleeding ulcer Colon/Cecal ulcer.            - s/p unsynchronized DCCV 120J in ED           - Cr worsening 2.5 (BL 1.7),  (Bl <500), Lactic 2.3      VT / VF storm s/p unsuccessful ICD therapy   ADHF in the setting of dietary noncompliance  WAGNER due to cardiorenal syndrome  Lactic acidosis due to ADHF / Shock      - Admit to [facility], Discussed with Dr. Hadley, evaluated patient at bedside  - Electrophysiology consultation, discussed with EP fellow  - ICU admission   - VAD orderset  - Given amiodarone 150mg x2, Lidocaine 100mg, Start Amiodarone 1mg/min           - Low threshold for Lidocaine if having further VT or Ectopy since this is PMVT/VF  - Lasix 80mg IV bolus followed by 40mg/hr ggt  - BMP / Mag / CBC last this evening, replete electrolytes   - INR 3.8, holding Warfarin in case for procedure (NOT BRIDGE WITH HEPARIN)  - formal TTE to follow   - Fluid restriction at 1500 cc with strict I/Os and daily STANDING weights  - Check Electrolytes, keep Mag >2 & K+ >4  - SCDs, TEDs, Nursing communication to elevated LE   - Ambulate as tolerated        LVAD in place:   TXP SACHI INTERROGATIONS 10/10/2019 8/28/2019 7/23/2019 7/23/2019 7/23/2019 7/22/2019 7/22/2019   Type HeartMate II HeartMate II - - - - -   Flow 7.4 5 - - - - -   Speed 9800 9800 - - - - -   PI 2.8 5.1 - - - - -   Power (Jackson) 7.6 6.2 - - - - -   LSL 9400 9400 - - - - -   Pulsatility No Pulse No Pulse Intermittent pulse Intermittent pulse Intermittent pulse Intermittent pulse Intermittent pulse           VT (ventricular tachycardia)  Hx of VT,  BiV-ICD Medtronic:            - PTA Amiodarone 200mg qday            - Last VT 10/2018 due to ADHF      - EP consulted, appreciate assistance  - Continue Amiodarone IV 1mg/min  - Start Lidocaine if needed  - Keep Pads and Medtronic  at bedside     Shortness of breath  In the setting of ADHF with pulmonary edema;   - goal stats > 90  - aggressive IV Diuresis   - IS / flutter valve         CKD (chronic kidney disease), stage III  Carol Ann on CKD , likely d/t Cardiorenal   - Hold Nephrotoxic medications, no Contrast, Keep normotensive and euvolemic    - Closely monitor Cr, BUN  - Consider nephrology consultation in AM    - If does not improve please obtain Urinelytes, UACC, Renal U/s             VTE Risk Mitigation (From admission, onward)    None          Howie Kiser MD  Interventional Cardiology   [facility]

## 2019-10-12 NOTE — CONSULTS
"[facility]  Cardiac Electrophysiology  Consult Note    Admission Date: 10/12/2019  Code Status: Full Code   Attending Provider: Brittny Mendieta, *  Consulting Provider: Howie Kiser MD  Principal Problem:<principal problem not specified>    Inpatient consult to Electrophysiology  Consult performed by: Howie Lizama MD  Consult ordered by: Howie Lizama MD        Subjective:     Chief Complaint:  VT / VF     HPI:   Tim Richards is a 53 y.o. male who presents with ICD fire x5. EP consulted for VF/VT dilan s/p ICD shock. This is a 52 yo AAM with DCM/ combined HF stage D (EF 10%, grade III DD) s/p HM II (9800) with Outflow graft changed (03/9/18) as DT, BiV-IcD Medtronic (2014), HtN, Tobacco use, obesity, Gout who was recently discharge (07/16/19 - 07/23/19) who presented today with spouse s/p ICD fire x 6. Patient was seen by Dr. Bautista in clinic for ADHF on 10/10/19 made diuretic adjustments without any improvement in weight or UOP. Today feeling more SOB / COLEMAN / worsening LE edema received ICD therapy x 6. Upon my arrival patient was not on telemetry, Medtronic ICD interrogated, Mode DDD, underlying rhythm VF, not able to interrogate most recent therapy as "episode still recording". Case discussed with EP team / Dr. Hadley, Code chart brought to ED, pads placed, given amiodarone 150mg x2, Lidocaine 100mg, Magnesium 2mg, Versed 2mg x2 and Fentanyl 50mg x2 s/p unsynchronized DCCV 120J, which successfully converted him into AS- rhythm. ICD re-interrogated and demonstrated MMVT () on10/12/2019 for total of 11:52 ATP x 5 / DCCV x 6 S/p unsuccessful ATP x3 followed by unsuccessful DCCV x 1followed unsuccessful ATP burst x2 which degenerated into VF s/p unsuccessful DCCV x 5. Prior to this last event was 10/2018 received DCCV x1. Patient has been on Amiodarone for 1 year 200mg qday, complaint with medications, no recent changes.     TTE demonstrated EF >10%, AV " does not open, IVF dilated 2.5cm with respiratory variation >50%, Dilated RV / LV.   Cr worsening 2.5 (BL 1.7)   (Bl <500)  Lactic 2.3   LFTs wnl  CBC wnl  INR 3.8      Mode: DDD  Rate 50 - 120 BPM, Upper track 150BPM  V. Pacing LV -> RV  Total AS- 96.8%  AT/AF: Monitor >171 BPM, therapies off  VF: >231BPM ATP during charging, 35j x6    - 231 BPM, Burst (3), burst (3), 35J x 4  Elevated Optivol Fluid index and thoracic impedance  Battery: estimated  9 - 25 months.       Past Medical History:   Diagnosis Date    CHF (congestive heart failure)     Dilated cardiomyopathy 1/10/2018    Disorder of kidney and ureter     CKD    Gout     HTN (hypertension)     Ventricular tachycardia (paroxysmal)        Past Surgical History:   Procedure Laterality Date    CARDIAC CATHETERIZATION  Dec. 2012    CARDIAC DEFIBRILLATOR PLACEMENT Left     CRRT-D    COLONOSCOPY N/A 3/6/2018    Procedure: COLONOSCOPY;  Surgeon: Alonso Bone MD;  Location: [facility] ([address] [place] [address]);  Service: Endoscopy;  Laterality: N/A;    COLONOSCOPY N/A 7/17/2019    Procedure: COLONOSCOPY;  Surgeon: Blane Valdez MD;  Location: [facility] ([address] [place] [address]);  Service: Endoscopy;  Laterality: N/A;    COLONOSCOPY N/A 7/18/2019    Procedure: COLONOSCOPY;  Surgeon: Blane Valdez MD;  Location: [facility] ([address] [place] [address]);  Service: Endoscopy;  Laterality: N/A;    ESOPHAGOGASTRODUODENOSCOPY N/A 7/17/2019    Procedure: EGD (ESOPHAGOGASTRODUODENOSCOPY);  Surgeon: Blane Valdez MD;  Location: [facility] ([address] [place] [address]);  Service: Endoscopy;  Laterality: N/A;    ESOPHAGOGASTRODUODENOSCOPY N/A 7/18/2019    Procedure: EGD (ESOPHAGOGASTRODUODENOSCOPY);  Surgeon: Blane Valdez MD;  Location: [facility] ([address] [place] [address]);  Service: Endoscopy;  Laterality: N/A;       Review of patient's allergies indicates:   Allergen Reactions    Lisinopril Anaphylaxis       No current facility-administered medications on file prior to encounter.      Current Outpatient Medications on File Prior to Encounter    Medication Sig    allopurinol (ZYLOPRIM) 100 MG tablet TAKE 1 TABLET BY MOUTH EVERY DAY    amiodarone (PACERONE) 200 MG Tab TAKE 1 TABLET (200 MG TOTAL) BY MOUTH ONCE DAILY.    amLODIPine (NORVASC) 10 MG tablet Take 1 tablet (10 mg total) by mouth once daily.    doxazosin (CARDURA) 8 MG Tab Take 1 tablet (8 mg total) by mouth every 12 (twelve) hours.    ferrous gluconate (FERGON) 324 MG tablet Take 1 tablet (324 mg total) by mouth daily with breakfast.    magnesium oxide (MAG-OX) 400 mg (241.3 mg magnesium) tablet TAKE 1 TABLET (400 MG TOTAL) BY MOUTH 3 (THREE) TIMES DAILY.    mesalamine (PENTASA) 250 mg CpSR Take 4 capsules (1,000 mg total) by mouth 4 (four) times daily.    pantoprazole (PROTONIX) 40 MG tablet TAKE 1 TABLET (40 MG TOTAL) BY MOUTH ONCE DAILY.    potassium chloride (K-TAB) 20 mEq Take 1 tablet (20 mEq total) by mouth 2 (two) times daily.    torsemide (DEMADEX) 20 MG Tab Take 40 mg (2 tabs) qam and 20 mg (1 tab) qpm for 3 days then 40 mg (2 tabs) daily.    warfarin (COUMADIN) 5 MG tablet Take 1.5 tabs by mouth on  only, followed by weekly dose of 1 tablet daily, except 1.5 tabs on Mon, Wed, Fri    sildenafil (VIAGRA) 100 MG tablet Take 1 tablet (100 mg total) by mouth daily as needed for Erectile Dysfunction.     Family History     Problem Relation (Age of Onset)    Cancer Sister (54)    Coronary artery disease Father    Diabetes Father    Heart disease Father    Hypertension Father    No Known Problems Mother, Brother        Tobacco Use    Smoking status: Former Smoker     Packs/day: 1.00     Years: 31.00     Pack years: 31.00     Types: Cigarettes     Last attempt to quit: 2018     Years since quittin.7    Smokeless tobacco: Never Used    Tobacco comment: pt is quiting on his own - pt stated not qualified for program;  pt  quit on his own   Substance and Sexual Activity    Alcohol use: No     Alcohol/week: 0.0 standard drinks     Comment: one fifth of gin or  rum/week    Drug use: No    Sexual activity: Yes     Partners: Female     Birth control/protection: None     Comment: 10/5/17  with same partner 7 years      Review of Systems   Constitution: Positive for malaise/fatigue and weight gain. Negative for chills, decreased appetite, diaphoresis, fever and weight loss.   Eyes: Negative for blurred vision.   Cardiovascular: Positive for chest pain, leg swelling and palpitations. Negative for dyspnea on exertion, irregular heartbeat, near-syncope and orthopnea.   Respiratory: Positive for shortness of breath. Negative for cough, snoring and wheezing.    Gastrointestinal: Negative for abdominal pain, nausea and vomiting.   Genitourinary: Negative for bladder incontinence and urgency.     Objective:     Vital Signs (Most Recent):  Temp: 98.8 °F (37.1 °C) (10/12/19 1237)  Pulse: 79 (10/12/19 1658)  Resp: 18 (10/12/19 1511)  BP: (!) 76/0 (10/12/19 1346)  SpO2: 100 % (10/12/19 1658) Vital Signs (24h Range):  Temp:  [98.8 °F (37.1 °C)] 98.8 °F (37.1 °C)  Pulse:  [76-96] 79  Resp:  [16-20] 18  SpO2:  [91 %-100 %] 100 %  BP: (76)/(0) 76/0       Weight: 126.8 kg (279 lb 8.7 oz)  Body mass index is 36.88 kg/m².    SpO2: 100 %  O2 Device (Oxygen Therapy): room air    Physical Exam   Constitutional: He is oriented to person, place, and time. He appears well-developed and well-nourished. He appears distressed.       Neck: JVD present.   Cardiovascular: Normal rate, regular rhythm, normal heart sounds and intact distal pulses. Exam reveals no gallop and no friction rub.   No murmur heard.  Pulmonary/Chest: He is in respiratory distress. He has no wheezes. He has rales. He exhibits no tenderness.   Abdominal: Soft. Bowel sounds are normal. He exhibits no distension and no mass. There is no tenderness. There is no rebound and no guarding.   Musculoskeletal: Normal range of motion. He exhibits edema.   Neurological: He is alert and oriented to person, place, and time.   Skin: Skin  is warm. He is diaphoretic. No erythema.   Nursing note and vitals reviewed.      Significant Labs:   BMP:   Recent Labs   Lab 10/12/19  1254 10/12/19  1539   * 119*    134*   K 4.0 4.4   CL 99 100   CO2 24 25   BUN 21* 22*   CREATININE 2.5* 2.4*   CALCIUM 9.7 9.3   MG  --  3.4*   , CMP:   Recent Labs   Lab 10/12/19  1254 10/12/19  1539    134*   K 4.0 4.4   CL 99 100   CO2 24 25   * 119*   BUN 21* 22*   CREATININE 2.5* 2.4*   CALCIUM 9.7 9.3   PROT 7.9  --    ALBUMIN 4.2  --    BILITOT 0.5  --    ALKPHOS 92  --    AST 26  --    ALT 21  --    ANIONGAP 13 9   ESTGFRAFRICA 32.9* 34.6*   EGFRNONAA 28.5* 29.9*   , CBC:   Recent Labs   Lab 10/12/19  1254 10/12/19  1539   WBC 6.14 8.15   HGB 12.7* 12.4*   HCT 41.5 40.1    211    and INR:   Recent Labs   Lab 10/12/19  1254 10/12/19  1539   INR 3.7* 3.8*       Significant Imaging: Echocardiogram:   Transthoracic echo (TTE) complete (Cupid Only):   Results for orders placed or performed during the hospital encounter of 07/16/19   Transthoracic echo (TTE) 2D with Color Flow   Result Value Ref Range    Ascending aorta 3.36 cm    STJ 2.91 cm    IVS 0.86 0.6 - 1.1 cm    LA size 4.05 cm    Left Atrium Major Axis 5.77 cm    Left Atrium Minor Axis 5.89 cm    LVIDD 7.23 (A) 3.5 - 6.0 cm    LVIDS 6.70 (A) 2.1 - 4.0 cm    LVOT diameter 2.06 cm    PW 1.14 (A) 0.6 - 1.1 cm    RA Major Axis 4.53 cm    RA Width 3.41 cm    RVDD 4.36 cm    Sinus 3.40 cm    TAPSE 2.26 cm    TDI LATERAL 0.04 m/s    LA WIDTH 3.92 cm    LV Diastolic Volume 274.38 mL    LV Systolic Volume 231.48 mL    FS 7 %    LA volume 78.67 cm3    LV mass 340.39 g    Left Ventricle Relative Wall Thickness 0.32 cm    LVOT area 3.3 cm2    LV Systolic Volume Index 97.2 mL/m2    LV Diastolic Volume Index 115.20 mL/m2    LA Volume Index 33.0 mL/m2    LV Mass Index 143 g/m2    BSA 2.45 m2    Right Atrial Pressure (from IVC) 3 mmHg    Narrative    · LVAD present. Base speed is 9800. The pump type is  "a Heartmate II.  · Severe left ventricular enlargement.  · Severely decreased left ventricular systolic function. The estimated   ejection fraction is 13%  · Eccentric left ventricular hypertrophy.  · The interventricular septum appears to bow into the right ventricle. The   aortic valve does not open.  · Left ventricular diastolic dysfunction.  · Mild right ventricular enlargement.  · Normal right ventricular systolic function.  · Mild mitral regurgitation.  · Normal central venous pressure (3 mm Hg).  · Small pericardial effusion.                               Assessment and Plan:     VT (ventricular tachycardia)  Tim Richards is a 53 y.o. male who presents with ICD fire x5. EP consulted for VF/VT dilan s/p ICD shock.    - Hx of DCM/ combined HF stage D (EF 10%, grade III DD) s/p HM II (9800) with Outflow graft changed (03/9/18) as DT, BiV-IcD Medtronic (2014), HtN,    - Medtronic ICD interrogated, Mode DDD, underlying rhythm VF, not able to interrogate most recent therapy as "episode still recording". Elevated Optivol Fluid index and thoracic impedance   - ICD re-interrogated, event initiated with MMVT () on 10/12/2019 for total of 11:52 ATP x 5 / DCCV x 6 S/p unsuccessful ATP x3 followed by unsuccessful DCCV x 1followed unsuccessful ATP burst x2 which degenerated into VF s/p unsuccessful DCCV x 5.    - Prior to this last event was 10/2018 received DCCV x1. Patient has been on Amiodarone for 1 year 200mg qday,     VT / VF storm in the setting of ADHF s/p amiodarone 150mg x2, Lidocaine 100mg, Magnesium 2mg, Versed 2mg x2 and Fentanyl 50mg x2 s/p unsynchronized DCCV 120J, which successfully converted him into AS- rhythm.     - Case discussed with EP team Dr. Torres / Dr. Fay  - Am team to discuss with Medtronic rep investigate why patient did not receive therapy for sustained VF?   - Amiodarone 1mg/min, hold PTA PO Amiodarone   - Low threshold for Lidocaine ggt   - Diuresis per HTS   - Monitor " electrolytes Mag >2, K >4  - Consider Transplantation evaluation, Clinic notes HM II with DT   - Keep  and defib pads on board   - DFT on discharge with Amiodarone reload on board            Thank you for your consult. I will follow-up with patient. Please contact us if you have any additional questions.    Howie Kiser MD  Cardiac Electrophysiology  [facility]

## 2019-10-12 NOTE — SUBJECTIVE & OBJECTIVE
Past Medical History:   Diagnosis Date    CHF (congestive heart failure)     Dilated cardiomyopathy 1/10/2018    Disorder of kidney and ureter     CKD    Gout     HTN (hypertension)     Ventricular tachycardia (paroxysmal)        Past Surgical History:   Procedure Laterality Date    CARDIAC CATHETERIZATION  Dec. 2012    CARDIAC DEFIBRILLATOR PLACEMENT Left     CRRT-D    COLONOSCOPY N/A 3/6/2018    Procedure: COLONOSCOPY;  Surgeon: Alonso Bone MD;  Location: [facility] ENDO (2ND FLR);  Service: Endoscopy;  Laterality: N/A;    COLONOSCOPY N/A 7/17/2019    Procedure: COLONOSCOPY;  Surgeon: Blane Valdez MD;  Location: [facility] ENDO (2ND FLR);  Service: Endoscopy;  Laterality: N/A;    COLONOSCOPY N/A 7/18/2019    Procedure: COLONOSCOPY;  Surgeon: Blane Valdez MD;  Location: [facility] ENDO (2ND FLR);  Service: Endoscopy;  Laterality: N/A;    ESOPHAGOGASTRODUODENOSCOPY N/A 7/17/2019    Procedure: EGD (ESOPHAGOGASTRODUODENOSCOPY);  Surgeon: Blane Valdez MD;  Location: [facility] ENDO (2ND FLR);  Service: Endoscopy;  Laterality: N/A;    ESOPHAGOGASTRODUODENOSCOPY N/A 7/18/2019    Procedure: EGD (ESOPHAGOGASTRODUODENOSCOPY);  Surgeon: Blane Valdez MD;  Location: [facility] (2ND FLR);  Service: Endoscopy;  Laterality: N/A;       Review of patient's allergies indicates:   Allergen Reactions    Lisinopril Anaphylaxis       No current facility-administered medications on file prior to encounter.      Current Outpatient Medications on File Prior to Encounter   Medication Sig    allopurinol (ZYLOPRIM) 100 MG tablet TAKE 1 TABLET BY MOUTH EVERY DAY    amiodarone (PACERONE) 200 MG Tab TAKE 1 TABLET (200 MG TOTAL) BY MOUTH ONCE DAILY.    amLODIPine (NORVASC) 10 MG tablet Take 1 tablet (10 mg total) by mouth once daily.    doxazosin (CARDURA) 8 MG Tab Take 1 tablet (8 mg total) by mouth every 12 (twelve) hours.    ferrous gluconate (FERGON) 324 MG tablet Take 1 tablet (324 mg total) by mouth daily with breakfast.    magnesium oxide (MAG-OX)  400 mg (241.3 mg magnesium) tablet TAKE 1 TABLET (400 MG TOTAL) BY MOUTH 3 (THREE) TIMES DAILY.    mesalamine (PENTASA) 250 mg CpSR Take 4 capsules (1,000 mg total) by mouth 4 (four) times daily.    pantoprazole (PROTONIX) 40 MG tablet TAKE 1 TABLET (40 MG TOTAL) BY MOUTH ONCE DAILY.    potassium chloride (K-TAB) 20 mEq Take 1 tablet (20 mEq total) by mouth 2 (two) times daily.    torsemide (DEMADEX) 20 MG Tab Take 40 mg (2 tabs) qam and 20 mg (1 tab) qpm for 3 days then 40 mg (2 tabs) daily.    warfarin (COUMADIN) 5 MG tablet Take 1.5 tabs by mouth on  only, followed by weekly dose of 1 tablet daily, except 1.5 tabs on Mon, Wed, Fri    sildenafil (VIAGRA) 100 MG tablet Take 1 tablet (100 mg total) by mouth daily as needed for Erectile Dysfunction.     Family History     Problem Relation (Age of Onset)    Cancer Sister (54)    Coronary artery disease Father    Diabetes Father    Heart disease Father    Hypertension Father    No Known Problems Mother, Brother        Tobacco Use    Smoking status: Former Smoker     Packs/day: 1.00     Years: 31.00     Pack years: 31.00     Types: Cigarettes     Last attempt to quit: 2018     Years since quittin.7    Smokeless tobacco: Never Used    Tobacco comment: pt is quiting on his own - pt stated not qualified for program;  pt  quit on his own   Substance and Sexual Activity    Alcohol use: No     Alcohol/week: 0.0 standard drinks     Comment: one fifth of gin or rum/week    Drug use: No    Sexual activity: Yes     Partners: Female     Birth control/protection: None     Comment: 10/5/17  with same partner 7 years      Review of Systems   Constitution: Positive for malaise/fatigue and weight gain. Negative for chills, decreased appetite, diaphoresis, fever and weight loss.   Eyes: Negative for blurred vision.   Cardiovascular: Positive for chest pain, leg swelling and palpitations. Negative for dyspnea on exertion, irregular heartbeat,  near-syncope and orthopnea.   Respiratory: Positive for shortness of breath. Negative for cough, snoring and wheezing.    Gastrointestinal: Negative for abdominal pain, nausea and vomiting.   Genitourinary: Negative for bladder incontinence and urgency.     Objective:     Vital Signs (Most Recent):  Temp: 98.8 °F (37.1 °C) (10/12/19 1237)  Pulse: 79 (10/12/19 1658)  Resp: 18 (10/12/19 1511)  BP: (!) 76/0 (10/12/19 1346)  SpO2: 100 % (10/12/19 1658) Vital Signs (24h Range):  Temp:  [98.8 °F (37.1 °C)] 98.8 °F (37.1 °C)  Pulse:  [76-96] 79  Resp:  [16-20] 18  SpO2:  [91 %-100 %] 100 %  BP: (76)/(0) 76/0       Weight: 126.8 kg (279 lb 8.7 oz)  Body mass index is 36.88 kg/m².    SpO2: 100 %  O2 Device (Oxygen Therapy): room air    Physical Exam   Constitutional: He is oriented to person, place, and time. He appears well-developed and well-nourished. He appears distressed.       Neck: JVD present.   Cardiovascular: Normal rate, regular rhythm, normal heart sounds and intact distal pulses. Exam reveals no gallop and no friction rub.   No murmur heard.  Pulmonary/Chest: He is in respiratory distress. He has no wheezes. He has rales. He exhibits no tenderness.   Abdominal: Soft. Bowel sounds are normal. He exhibits no distension and no mass. There is no tenderness. There is no rebound and no guarding.   Musculoskeletal: Normal range of motion. He exhibits edema.   Neurological: He is alert and oriented to person, place, and time.   Skin: Skin is warm. He is diaphoretic. No erythema.   Nursing note and vitals reviewed.      Significant Labs:   BMP:   Recent Labs   Lab 10/12/19  1254 10/12/19  1539   * 119*    134*   K 4.0 4.4   CL 99 100   CO2 24 25   BUN 21* 22*   CREATININE 2.5* 2.4*   CALCIUM 9.7 9.3   MG  --  3.4*   , CMP:   Recent Labs   Lab 10/12/19  1254 10/12/19  1539    134*   K 4.0 4.4   CL 99 100   CO2 24 25   * 119*   BUN 21* 22*   CREATININE 2.5* 2.4*   CALCIUM 9.7 9.3   PROT 7.9  --     ALBUMIN 4.2  --    BILITOT 0.5  --    ALKPHOS 92  --    AST 26  --    ALT 21  --    ANIONGAP 13 9   ESTGFRAFRICA 32.9* 34.6*   EGFRNONAA 28.5* 29.9*   , CBC:   Recent Labs   Lab 10/12/19  1254 10/12/19  1539   WBC 6.14 8.15   HGB 12.7* 12.4*   HCT 41.5 40.1    211    and INR:   Recent Labs   Lab 10/12/19  1254 10/12/19  1539   INR 3.7* 3.8*       Significant Imaging: Echocardiogram:   Transthoracic echo (TTE) complete (Cupid Only):   Results for orders placed or performed during the hospital encounter of 07/16/19   Transthoracic echo (TTE) 2D with Color Flow   Result Value Ref Range    Ascending aorta 3.36 cm    STJ 2.91 cm    IVS 0.86 0.6 - 1.1 cm    LA size 4.05 cm    Left Atrium Major Axis 5.77 cm    Left Atrium Minor Axis 5.89 cm    LVIDD 7.23 (A) 3.5 - 6.0 cm    LVIDS 6.70 (A) 2.1 - 4.0 cm    LVOT diameter 2.06 cm    PW 1.14 (A) 0.6 - 1.1 cm    RA Major Axis 4.53 cm    RA Width 3.41 cm    RVDD 4.36 cm    Sinus 3.40 cm    TAPSE 2.26 cm    TDI LATERAL 0.04 m/s    LA WIDTH 3.92 cm    LV Diastolic Volume 274.38 mL    LV Systolic Volume 231.48 mL    FS 7 %    LA volume 78.67 cm3    LV mass 340.39 g    Left Ventricle Relative Wall Thickness 0.32 cm    LVOT area 3.3 cm2    LV Systolic Volume Index 97.2 mL/m2    LV Diastolic Volume Index 115.20 mL/m2    LA Volume Index 33.0 mL/m2    LV Mass Index 143 g/m2    BSA 2.45 m2    Right Atrial Pressure (from IVC) 3 mmHg    Narrative    · LVAD present. Base speed is 9800. The pump type is a Heartmate II.  · Severe left ventricular enlargement.  · Severely decreased left ventricular systolic function. The estimated   ejection fraction is 13%  · Eccentric left ventricular hypertrophy.  · The interventricular septum appears to bow into the right ventricle. The   aortic valve does not open.  · Left ventricular diastolic dysfunction.  · Mild right ventricular enlargement.  · Normal right ventricular systolic function.  · Mild mitral regurgitation.  · Normal central venous  pressure (3 mm Hg).  · Small pericardial effusion.

## 2019-10-12 NOTE — ASSESSMENT & PLAN NOTE
In the setting of ADHF with pulmonary edema;     - goal stats > 90  - aggressive IV Diuresis   - IS / flutter valve

## 2019-10-12 NOTE — ASSESSMENT & PLAN NOTE
"Tim Richards is a 53 y.o. male who presents with ICD fire x5. EP consulted for VF/VT dilan s/p ICD shock.    - Hx of DCM/ combined HF stage D (EF 10%, grade III DD) s/p HM II (9800) with Outflow graft changed (03/9/18) as DT, BiV-IcD Medtronic (2014), HtN,    - Medtronic ICD interrogated, Mode DDD, underlying rhythm VF, not able to interrogate most recent therapy as "episode still recording". Elevated Optivol Fluid index and thoracic impedance   - ICD re-interrogated, event initiated with MMVT () on 10/12/2019 for total of 11:52 ATP x 5 / DCCV x 6 S/p unsuccessful ATP x3 followed by unsuccessful DCCV x 1followed unsuccessful ATP burst x2 which degenerated into VF s/p unsuccessful DCCV x 5.    - Prior to this last event was 10/2018 received DCCV x1. Patient has been on Amiodarone for 1 year 200mg qday,     VT / VF storm in the setting of ADHF s/p amiodarone 150mg x2, Lidocaine 100mg, Magnesium 2mg, Versed 2mg x2 and Fentanyl 50mg x2 s/p unsynchronized DCCV 120J, which successfully converted him into AS- rhythm.     - Case discussed with EP team Dr. Torres / Dr. Valenzuela  - Amiodarone 1mg/min, hold PTA PO Amiodarone   - Low threshold for Lidocaine ggt   - Diuresis per HTS   - Monitor electrolytes Mag >2, K >4  - Consider Transplantation evaluation, Clinic notes HM II with DT   - Keep  and defib pads on board   - DFT on discharge with Amiodarone reload on board      "

## 2019-10-12 NOTE — PROCEDURES
BIV ICD Medtronic:   Implanted 10/31/14   Indication: cardiomyopathy EF 25%    Mode: DDD  Rate 50 - 120 BPM, Upper track 150BPM  V. Pacing LV -> RV  Total AS- 96.8%    AT/AF: Monitor >171 BPM, therapies off  VF: >231BPM ATP during charging, 35j x6    - 231 BPM, Burst (3), burst (3), 35J x 4    Elevated Optivol Fluid index and thoracic impedance    Battery: estimated  9 - 25 months.     10/12/2019: time 11:52: ATP x 5 / DCCV x 6    - MMVT ()     - S/p unsuccessful ATP x3     - followed by unsuccessful DCCV x 1    - followed unsuccessful ATP burst x2 which degenerated into VF s/p unsuccessful DCCV x 5    Underlying rhythm noted to be VF, defib pads placed, loaded Amiodarone 150mg x2, Lidocaine 100mg, Magnesium 2mg, Versed 4mg / Fentanyl 100mcg. Given Unsynchronized 120V DCCV, which was successful, current rhythm AS- 70.     Hwoie Lizama M.D.  Page # [phone]  Cardiovascular Fellow PGY-V  [facility]

## 2019-10-12 NOTE — CONSULTS
"[facility]  Interventional Cardiology  Consult Note    Patient Name: Tim Richards  MRN: [id number]  Admission Date: 10/12/2019  Hospital Length of Stay: 0 days  Code Status: Full Code   Attending Provider: Brittny Mendieta, *  Consulting Provider: Howie Kiser MD  Primary Care Physician: Ja Méndez MD  Principal Problem:<principal problem not specified>    Patient information was obtained from patient and ER records.     Inpatient consult to Cardiology  Consult performed by: Howie Lizama MD  Consult ordered by: Brittny Mendieta MD        Subjective:     Chief Complaint:  ICD fire     HPI:  Tim Richards is a 53 y.o. male who presents with ICD fire x5. Cardiology consulted for further evaluation. This is a 54 yo AAM with DCM/ combined HF stage D (EF 10%, grade III DD) s/p HM II (9800) with Outflow graft changed (03/9/18), BiV-IcD Medtronic, HtN, Tobacco use, obesity, Gout who was recently discharge (07/16/19 - 07/23/19) with acute blood loss anemia in the setting of GI bleed s/p 10u transfused, non bleeding ulcer Colon/Cecal ulcer. Presents with ICD fire x 5. Patient seen and examined spouse at bedside, state over the past couple of weeks had fluid built up, now with SOB / COLEMAN / Le edema NYHA class III symptoms due to dietary noncompliance. He started to take Lasix 20mg PRN in addition to Bumex 2mg qday and Aldacontone without much improvement. See by Dr. Bautista in clinic (see below), started on Torsemide which did not help with UOP and continued to have worsening SOB / le edema. This morning presented with ICD fire x 5 otherwise having right sided shoulder pain. Bedside TTE demonstrated EF >10%, AV does not open, IVF dilated 2.5cm with respiratory variation >50%, Dilated RV / LV. Medtronic ICD interrogated noted underlying rhythm VF, not able to interrogate most recent therapy as "episode still recording". Case discussed with EP team, recommended external " "defib. Dr. Hadley updated and seen patient at bedside. Code chart brought to ED, pads placed, given amiodarone 150mg x2, Lidocaine 100mg, Magnesium 2mg, Versed 2mg x2 and Fentanyl 50mg x2 s/p unsynchronized DCCV 120J, which successfully converted him into AS- rhythm. End tidal CO2 placed. Spouse updated.     Vitals stable.   Cr worsening 2.5 (BL 1.7)   (Bl <500)  Lactic 2.3   LFTs wnl  CBC wnl  INR 3.8    Seen by Dr. Bautista in clinic on 10/10/19, reported pre-syncopal symptoms, felt weak, lightheaded. Along with increasing COLEMAN, LE swelling, noncompliant with dietary intake due to holidays with 15 lbs weigh gain, consistent with NYHA class III symptoms, needing to increase diuretics Taking Lasix in addition to his Bumex. In clinic, Cr elevated 2.1 (BL ~1.7), Bumex dc'd started on Torsemide 40mg qday with 10mg qpm x 3 days followed by Torsemide 40mg qday,     DLES is a "1"  INR is therapeutic at 2.6.   LDH is 228 at baseline.     Implant Date: 3/8/2018  Heartmate II RPM 9800  3/9/18 outflow graft changed  INR goal: 2-3   NO Bridge with Heparin             Past Medical History:   Diagnosis Date    CHF (congestive heart failure)     Dilated cardiomyopathy 1/10/2018    Disorder of kidney and ureter     CKD    Gout     HTN (hypertension)     Ventricular tachycardia (paroxysmal)        Past Surgical History:   Procedure Laterality Date    CARDIAC CATHETERIZATION  Dec. 2012    CARDIAC DEFIBRILLATOR PLACEMENT Left     CRRT-D    COLONOSCOPY N/A 3/6/2018    Procedure: COLONOSCOPY;  Surgeon: Alonso Bone MD;  Location: [facility] ([address] [place] [address]);  Service: Endoscopy;  Laterality: N/A;    COLONOSCOPY N/A 7/17/2019    Procedure: COLONOSCOPY;  Surgeon: Blane Valdez MD;  Location: [facility] ([address] [place] [address]);  Service: Endoscopy;  Laterality: N/A;    COLONOSCOPY N/A 7/18/2019    Procedure: COLONOSCOPY;  Surgeon: Blane Valdez MD;  Location: [facility] ([address] [place] [address]);  Service: Endoscopy;  Laterality: N/A;    ESOPHAGOGASTRODUODENOSCOPY " N/A 2019    Procedure: EGD (ESOPHAGOGASTRODUODENOSCOPY);  Surgeon: Blane Valdez MD;  [facility]  Service: Endoscopy;  Laterality: N/A;    ESOPHAGOGASTRODUODENOSCOPY N/A 2019    Procedure: EGD (ESOPHAGOGASTRODUODENOSCOPY);  Surgeon: Blane Valdez MD;  [facility]  Service: Endoscopy;  Laterality: N/A;       Review of patient's allergies indicates:   Allergen Reactions    Lisinopril Anaphylaxis         (Not in a hospital admission)  Family History     Problem Relation (Age of Onset)    Cancer Sister (54)    Coronary artery disease Father    Diabetes Father    Heart disease Father    Hypertension Father    No Known Problems Mother, Brother        Tobacco Use    Smoking status: Former Smoker     Packs/day: 1.00     Years: 31.00     Pack years: 31.00     Types: Cigarettes     Last attempt to quit: 2018     Years since quittin.7    Smokeless tobacco: Never Used    Tobacco comment: pt is quiting on his own - pt stated not qualified for program;  pt  quit on his own   Substance and Sexual Activity    Alcohol use: No     Alcohol/week: 0.0 standard drinks     Comment: one fifth of gin or rum/week    Drug use: No    Sexual activity: Yes     Partners: Female     Birth control/protection: None     Comment: 10/5/17  with same partner 7 years      Review of Systems   Constitution: Positive for malaise/fatigue and weight gain. Negative for chills, decreased appetite, diaphoresis, fever and weight loss.   Eyes: Negative for blurred vision.   Cardiovascular: Positive for chest pain and palpitations. Negative for dyspnea on exertion, irregular heartbeat, leg swelling, near-syncope and orthopnea.   Respiratory: Positive for shortness of breath. Negative for cough, snoring and wheezing.    Gastrointestinal: Negative for abdominal pain, nausea and vomiting.   Genitourinary: Negative for bladder incontinence and urgency.     Objective:     Vital Signs (Most  Recent):  Temp: 98.8 °F (37.1 °C) (10/12/19 1237)  Pulse: 82 (10/12/19 1511)  Resp: 18 (10/12/19 1511)  BP: (!) 76/0 (10/12/19 1346)  SpO2: 99 % (10/12/19 1511) Vital Signs (24h Range):  Temp:  [98.8 °F (37.1 °C)] 98.8 °F (37.1 °C)  Pulse:  [82-96] 82  Resp:  [16-20] 18  SpO2:  [91 %-99 %] 99 %  BP: (76)/(0) 76/0     Weight: 126.8 kg (279 lb 8.7 oz)  Body mass index is 36.88 kg/m².    SpO2: 99 %  O2 Device (Oxygen Therapy): room air    No intake or output data in the 24 hours ending 10/12/19 1606    Lines/Drains/Airways     Line                 VAD 03/08/18 1124 Left ventricular assist device HeartMate  days          Peripheral Intravenous Line                 Peripheral IV - Single Lumen 10/12/19 1255 18 G Right Forearm less than 1 day         Peripheral IV - Single Lumen 10/12/19 1441 22 G Right Hand less than 1 day                Physical Exam    Significant Labs:   BMP:   Recent Labs   Lab 10/12/19  1254   *      K 4.0   CL 99   CO2 24   BUN 21*   CREATININE 2.5*   CALCIUM 9.7   , CMP   Recent Labs   Lab 10/12/19  1254      K 4.0   CL 99   CO2 24   *   BUN 21*   CREATININE 2.5*   CALCIUM 9.7   PROT 7.9   ALBUMIN 4.2   BILITOT 0.5   ALKPHOS 92   AST 26   ALT 21   ANIONGAP 13   ESTGFRAFRICA 32.9*   EGFRNONAA 28.5*   , CBC   Recent Labs   Lab 10/12/19  1254 10/12/19  1539   WBC 6.14 8.15   HGB 12.7* 12.4*   HCT 41.5 40.1    211   , INR   Recent Labs   Lab 10/12/19  1254 10/12/19  1539   INR 3.7* 3.8*    and Lipid Panel No results for input(s): CHOL, HDL, LDLCALC, TRIG, CHOLHDL in the last 48 hours.    Significant Imaging: Echocardiogram:   Transthoracic echo (TTE) complete (Cupid Only):   Results for orders placed or performed during the hospital encounter of 07/16/19   Transthoracic echo (TTE) 2D with Color Flow   Result Value Ref Range    Ascending aorta 3.36 cm    STJ 2.91 cm    IVS 0.86 0.6 - 1.1 cm    LA size 4.05 cm    Left Atrium Major Axis 5.77 cm    Left Atrium Minor  Axis 5.89 cm    LVIDD 7.23 (A) 3.5 - 6.0 cm    LVIDS 6.70 (A) 2.1 - 4.0 cm    LVOT diameter 2.06 cm    PW 1.14 (A) 0.6 - 1.1 cm    RA Major Axis 4.53 cm    RA Width 3.41 cm    RVDD 4.36 cm    Sinus 3.40 cm    TAPSE 2.26 cm    TDI LATERAL 0.04 m/s    LA WIDTH 3.92 cm    LV Diastolic Volume 274.38 mL    LV Systolic Volume 231.48 mL    FS 7 %    LA volume 78.67 cm3    LV mass 340.39 g    Left Ventricle Relative Wall Thickness 0.32 cm    LVOT area 3.3 cm2    LV Systolic Volume Index 97.2 mL/m2    LV Diastolic Volume Index 115.20 mL/m2    LA Volume Index 33.0 mL/m2    LV Mass Index 143 g/m2    BSA 2.45 m2    Right Atrial Pressure (from IVC) 3 mmHg    Narrative    · LVAD present. Base speed is 9800. The pump type is a Heartmate II.  · Severe left ventricular enlargement.  · Severely decreased left ventricular systolic function. The estimated   ejection fraction is 13%  · Eccentric left ventricular hypertrophy.  · The interventricular septum appears to bow into the right ventricle. The   aortic valve does not open.  · Left ventricular diastolic dysfunction.  · Mild right ventricular enlargement.  · Normal right ventricular systolic function.  · Mild mitral regurgitation.  · Normal central venous pressure (3 mm Hg).  · Small pericardial effusion.        Assessment and Plan:     LVAD (left ventricular assist device) present  Tim Richards is a 53 y.o. male who presented with MMVT s/p ATP which degenerated into VF s/p ICD fire x6 all of them unsuccessful in the setting of ADHF. Hx of DCM/ combined HF stage D (EF 10%, grade III DD) s/p HM II (9800) with Outflow graft changed (03/9/18) as DT, BiV-IcD Medtronic, HtN, Tobacco use, obesity, Gout who was recently discharge (07/16/19 - 07/23/19) with acute blood loss anemia in the setting of GI bleed s/p 10u transfused, non bleeding ulcer Colon/Cecal ulcer.    - s/p unsynchronized DCCV 120J in ED   - Cr worsening 2.5 (BL 1.7),  (Bl <500), Lactic 2.3     VT / VF storm s/p  unsuccessful ICD therapy   ADHF in the setting of dietary noncompliance  CAROL ANN due to cardiorenal syndrome  Lactic acidosis due to ADHF / Shock        - Admit to [facility], Discussed with Dr. Hadley, evaluated patient at bedside  - Electrophysiology consultation, discussed with EP fellow  - ICU admission   - VAD orderset  - Given amiodarone 150mg x2, Lidocaine 100mg, Start Amiodarone 1mg/min   - Low threshold for Lidocaine if having further VT or Ectopy since this is PMVT/VF  - Lasix 80mg IV bolus followed by 40mg/hr ggt  - BMP / Mag / CBC last this evening, replete electrolytes   - INR 3.8, holding Warfarin in case for procedure (NOT BRIDGE WITH HEPARIN)  - formal TTE to follow   - Fluid restriction at 1500 cc with strict I/Os and daily STANDING weights  - Check Electrolytes, keep Mag >2 & K+ >4  - SCDs, TEDs, Nursing communication to elevated LE   - Ambulate as tolerated               VT (ventricular tachycardia)  Hx of VT, BiV-ICD Medtronic:    - PTA Amiodarone 200mg qday    - Last VT 10/2018 due to ADHF     - EP consulted, appreciate assistance  - Continue Amiodarone IV 1mg/min  - Start Lidocaine if needed  - Keep Pads and Medtronic  at bedside    Shortness of breath  In the setting of ADHF with pulmonary edema;     - goal stats > 90  - aggressive IV Diuresis   - IS / flutter valve       CKD (chronic kidney disease), stage III  Carol Ann on CKD , likely d/t Cardiorenal   - Hold Nephrotoxic medications, no Contrast, Keep normotensive and euvolemic    - Closely monitor Cr, BUN  - Consider nephrology consultation in AM    - If does not improve please obtain Urinelytes, UACC, Renal U/s            VTE Risk Mitigation (From admission, onward)    None          Thank you for your consult. I will sign off. Please contact us if you have any additional questions.    Howie Kiser MD  Interventional Cardiology   [facility]

## 2019-10-12 NOTE — HPI
"Tim Richards is a 53 y.o. male who presents with ICD fire x5. Cardiology consulted for further evaluation. This is a 54 yo AAM with DCM/ combined HF stage D (EF 10%, grade III DD) s/p HM II (9800) with Outflow graft changed (03/9/18), BiV-IcD Medtronic, HtN, Tobacco use, obesity, Gout who was recently discharge (07/16/19 - 07/23/19) with acute blood loss anemia in the setting of GI bleed s/p 10u transfused, non bleeding ulcer Colon/Cecal ulcer. Presents with ICD fire x 5. Patient seen and examined spouse at bedside, state over the past couple of weeks had fluid built up, now with SOB / COLEMAN / Le edema NYHA class III symptoms due to dietary noncompliance. He started to take Lasix 20mg PRN in addition to Bumex 2mg qday and Aldacontone without much improvement. See by Dr. Bautista in clinic (see below), started on Torsemide which did not help with UOP and continued to have worsening SOB / le edema. This morning presented with ICD fire x 5 otherwise having right sided shoulder pain. Bedside TTE demonstrated EF >10%, AV does not open, IVF dilated 2.5cm with respiratory variation >50%, Dilated RV / LV. Medtronic ICD interrogated noted underlying rhythm VF, not able to interrogate most recent therapy as "episode still recording". Case discussed with EP team, recommended external defib. Dr. Hadley updated and seen patient at bedside. Code chart brought to ED, pads placed, given amiodarone 150mg x2, Lidocaine 100mg, Magnesium 2mg, Versed 2mg x2 and Fentanyl 50mg x2 s/p unsynchronized DCCV 120J, which successfully converted him into AS- rhythm. End tidal CO2 placed. Spouse updated.     Vitals stable.   Cr worsening 2.5 (BL 1.7)   (Bl <500)  Lactic 2.3   LFTs wnl  CBC wnl  INR 3.8    Seen by Dr. Bautista in clinic on 10/10/19, reported pre-syncopal symptoms, felt weak, lightheaded. Along with increasing COLEMAN, LE swelling, noncompliant with dietary intake due to holidays with 15 lbs weigh gain, consistent with NYHA class " "III symptoms, needing to increase diuretics Taking Lasix in addition to his Bumex. In clinic, Cr elevated 2.1 (BL ~1.7), Bumex dc'd started on Torsemide 40mg qday with 10mg qpm x 3 days followed by Torsemide 40mg qday,     DLES is a "1"  INR is therapeutic at 2.6.   LDH is 228 at baseline.     Implant Date: 3/8/2018  Heartmate II RPM 9800  3/9/18 outflow graft changed  INR goal: 2-3   NO Bridge with Heparin           "

## 2019-10-12 NOTE — ASSESSMENT & PLAN NOTE
52 yo AAM with s/p HM 2 at 9800, 3/8/18 as DT with repositioning of outflow graft 3/9/18, BiV Medtronic AICD, H/O VT shocks prior to LVAD, alcohol use,  CHF stage D, DCM, GI bleed (7/2019), gout, HTN, CKD ( BL - 1.5 - 1.6),came to ED after AICD firing and ADHF  - LDH of 302  - INR of 3.7-> 4.3 , took 7.5 mg on 10/11. Hold coumadin( Home dose 7.5 mg MWF, 5 mg other days)  -  ( Bl- 300- 400)    Procedure: Device Interrogation Including analysis of device parameters  Current Settings: Ventricular Assist Device  Review of device function is stable/unstable stable    TXP LVAD INTERROGATIONS 10/12/2019 10/10/2019 8/28/2019 7/23/2019 7/23/2019 7/23/2019 7/22/2019   Type - - - HeartMate II HeartMate II HeartMate II HeartMate II   Flow 5.7 - - 5.2 4.6 5.5 5.6   Speed 9800 - - 9800 9800 9800 9800   PI 1.8 - - 3.0 5.4 4.2 3.6   Power ([place]) 6.4 - - 6.4 6.0 6.2 6.6   LSL - - - - 9400 - -   Pulsatility - No Pulse No Pulse Intermittent pulse Intermittent pulse Intermittent pulse -

## 2019-10-12 NOTE — HPI
"Tim Richards is a 53 y.o. male who presents with ICD fire x5. EP consulted for VF/VT dilan s/p ICD shock. This is a 52 yo AAM with DCM/ combined HF stage D (EF 10%, grade III DD) s/p HM II (9800) with Outflow graft changed (03/9/18) as DT, BiV-IcD Medtronic (2014), HtN, Tobacco use, obesity, Gout who was recently discharge (07/16/19 - 07/23/19) who presented today with spouse s/p ICD fire x 6. Patient was seen by Dr. Bautista in clinic for ADHF on 10/10/19 made diuretic adjustments without any improvement in weight or UOP. Today feeling more SOB / COLEMAN / worsening LE edema received ICD therapy x 6. Upon my arrival patient was not on telemetry, Medtronic ICD interrogated, Mode DDD, underlying rhythm VF, not able to interrogate most recent therapy as "episode still recording". Case discussed with EP team / Dr. Hadley, Code chart brought to ED, pads placed, given amiodarone 150mg x2, Lidocaine 100mg, Magnesium 2mg, Versed 2mg x2 and Fentanyl 50mg x2 s/p unsynchronized DCCV 120J, which successfully converted him into AS- rhythm. ICD re-interrogated and demonstrated MMVT () on10/12/2019 for total of 11:52 ATP x 5 / DCCV x 6 S/p unsuccessful ATP x3 followed by unsuccessful DCCV x 1followed unsuccessful ATP burst x2 which degenerated into VF s/p unsuccessful DCCV x 5. Prior to this last event was 10/2018 received DCCV x1. Patient has been on Amiodarone for 1 year 200mg qday, complaint with medications, no recent changes.     TTE demonstrated EF >10%, AV does not open, IVF dilated 2.5cm with respiratory variation >50%, Dilated RV / LV.   Cr worsening 2.5 (BL 1.7)   (Bl <500)  Lactic 2.3   LFTs wnl  CBC wnl  INR 3.8      Mode: DDD  Rate 50 - 120 BPM, Upper track 150BPM  V. Pacing LV -> RV  Total AS- 96.8%  AT/AF: Monitor >171 BPM, therapies off  VF: >231BPM ATP during charging, 35j x6    - 231 BPM, Burst (3), burst (3), 35J x 4  Elevated Optivol Fluid index and thoracic impedance  Battery: estimated "  9 - 25 months.

## 2019-10-12 NOTE — ASSESSMENT & PLAN NOTE
Tim Richards is a 53 y.o. male who presented with MMVT s/p ATP which degenerated into VF s/p ICD fire x6 all of them unsuccessful in the setting of ADHF. Hx of DCM/ combined HF stage D (EF 10%, grade III DD) s/p HM II (9800) with Outflow graft changed (03/9/18) as DT, BiV-IcD Medtronic, HtN, Tobacco use, obesity, Gout who was recently discharge (07/16/19 - 07/23/19) with acute blood loss anemia in the setting of GI bleed s/p 10u transfused, non bleeding ulcer Colon/Cecal ulcer.    - s/p unsynchronized DCCV 120J in ED   - Cr worsening 2.5 (BL 1.7),  (Bl <500), Lactic 2.3     VT / VF storm s/p unsuccessful ICD therapy   ADHF in the setting of dietary noncompliance  WAGNER due to cardiorenal syndrome  Lactic acidosis due to ADHF / Shock        - Admit to [facility], Discussed with Dr. Hadley, evaluated patient at bedside  - Electrophysiology consultation, discussed with EP fellow  - ICU admission   - VAD orderset  - Given amiodarone 150mg x2, Lidocaine 100mg, Start Amiodarone 1mg/min   - Low threshold for Lidocaine if having further VT or Ectopy since this is PMVT/VF  - Lasix 80mg IV bolus followed by 40mg/hr ggt  - BMP / Mag / CBC last this evening, replete electrolytes   - INR 3.8, holding Warfarin in case for procedure (NOT BRIDGE WITH HEPARIN)  - formal TTE to follow   - Fluid restriction at 1500 cc with strict I/Os and daily STANDING weights  - Check Electrolytes, keep Mag >2 & K+ >4  - SCDs, TEDs, Nursing communication to elevated LE   - Ambulate as tolerated

## 2019-10-12 NOTE — ED PROVIDER NOTES
Encounter Date: 10/12/2019       History     Chief Complaint   Patient presents with    AICD Problem     defib shock 5 times on way here. LVAD patient. States could feel himself passing out just prior to shocks.      Pt is a 54 yo M with PMH of CHF, LVAD, AICD, paroxysmal V-tach, hypertension who presents with generalized weakness and his AICD firing 5 times prior to arrival.  Patient reports last time it fire was about 45 min prior to arrival.  Patient states he was driving at the time that it initially fired.  There were only a few seconds between each defibrillation.  He states this has not occurred since he had his LVAD placed 1.5 years ago.  He reports he has already informed his LVAD coordinator that he was coming to the ER.  Patient reports he felt weak today and noticed that the vein in his right neck was bulging.  He reports he has had increased leg swelling, and he does note that he has gained about 12-13 lb over the last month.  He was seen by his cardiologist several days ago who increased his Lasix dose to 40 mg in the morning and 20 mg at night.  Patient states however he has not noticed any increase in urine output and his swelling has not improved.  He states he has not had any LVAD alarms.        Review of patient's allergies indicates:   Allergen Reactions    Lisinopril Anaphylaxis     Past Medical History:   Diagnosis Date    CHF (congestive heart failure)     Dilated cardiomyopathy 1/10/2018    Disorder of kidney and ureter     CKD    Gout     HTN (hypertension)     Ventricular tachycardia (paroxysmal)      Past Surgical History:   Procedure Laterality Date    CARDIAC CATHETERIZATION  Dec. 2012    CARDIAC DEFIBRILLATOR PLACEMENT Left     CRRT-D    COLONOSCOPY N/A 3/6/2018    Procedure: COLONOSCOPY;  Surgeon: Alonso Bone MD;  Location: [facility] ([address] [place] [address];  Service: Endoscopy;  Laterality: N/A;    COLONOSCOPY N/A 7/17/2019    Procedure: COLONOSCOPY;  Surgeon: Blane Valdez MD;   [address]  Service: Endoscopy;  Laterality: N/A;    COLONOSCOPY N/A 2019    Procedure: COLONOSCOPY;  Surgeon: Blane Valdez MD;  Location: [facility] (2ND FLR);  Service: Endoscopy;  Laterality: N/A;    ESOPHAGOGASTRODUODENOSCOPY N/A 2019    Procedure: EGD (ESOPHAGOGASTRODUODENOSCOPY);  Surgeon: Blane Valdez MD;  [facility]  Service: Endoscopy;  Laterality: N/A;    ESOPHAGOGASTRODUODENOSCOPY N/A 2019    Procedure: EGD (ESOPHAGOGASTRODUODENOSCOPY);  Surgeon: Blane Valdez MD;  [facility]  Service: Endoscopy;  Laterality: N/A;     Family History   Problem Relation Age of Onset    Hypertension Father     Diabetes Father     Coronary artery disease Father     Heart disease Father         CHF    No Known Problems Mother     Cancer Sister 54        breast CA    No Known Problems Brother      Social History     Tobacco Use    Smoking status: Former Smoker     Packs/day: 1.00     Years: 31.00     Pack years: 31.00     Types: Cigarettes     Last attempt to quit: 2018     Years since quittin.7    Smokeless tobacco: Never Used    Tobacco comment: pt is quiting on his own - pt stated not qualified for program;  pt  quit on his own   Substance Use Topics    Alcohol use: No     Alcohol/week: 0.0 standard drinks     Comment: one fifth of gin or rum/week    Drug use: No     Review of Systems   Constitutional: Positive for fatigue. Negative for chills and fever.   HENT: Negative.    Eyes: Negative for visual disturbance.   Respiratory: Negative for shortness of breath.    Cardiovascular: Positive for leg swelling. Negative for chest pain and palpitations.   Gastrointestinal: Negative for abdominal pain and diarrhea.   Endocrine: Negative for polydipsia and polyuria.   Genitourinary: Negative for dysuria, frequency and urgency.   Musculoskeletal: Negative for back pain.   Skin: Negative for rash.   Allergic/Immunologic: Negative for  immunocompromised state.   Neurological: Positive for weakness. Negative for headaches.   Hematological: Bruises/bleeds easily.   Psychiatric/Behavioral: Positive for behavioral problems.   All other systems reviewed and are negative.      Physical Exam     Initial Vitals   BP Pulse Resp Temp SpO2   10/12/19 1346 10/12/19 1346 10/12/19 1237 10/12/19 1237 10/12/19 1346   (!) 76/0 96 16 98.8 °F (37.1 °C) 97 %      MAP       --                Physical Exam    Nursing note and vitals reviewed.  Constitutional: He appears well-developed and well-nourished. No distress.   HENT:   Head: Normocephalic and atraumatic.   Eyes: EOM are normal.   Neck: JVD present.   Cardiovascular:   Appears well perfused  LVAD in place without alarms   Pulmonary/Chest: No respiratory distress.   Abdominal: Soft. There is no tenderness.   Musculoskeletal: He exhibits edema (3+ Pitting edema to lower extremities bilaterally).   Neurological: He is alert and oriented to person, place, and time. GCS score is 15. GCS eye subscore is 4. GCS verbal subscore is 5. GCS motor subscore is 6.   Skin: Skin is warm and dry. Capillary refill takes less than 2 seconds.   Psychiatric: He has a normal mood and affect. Thought content normal.         ED Course   Procedures  Labs Reviewed   CBC W/ AUTO DIFFERENTIAL - Abnormal; Notable for the following components:       Result Value    Hemoglobin 12.7 (*)     Mean Corpuscular Hemoglobin 26.2 (*)     Mean Corpuscular Hemoglobin Conc 30.6 (*)     RDW 15.1 (*)     Lymph # 0.6 (*)     Gran% 79.7 (*)     Lymph% 9.3 (*)     All other components within normal limits    Narrative:     ADD ON LDH [id number] PER MD VAMSI  10/12/2019  13:25    COMPREHENSIVE METABOLIC PANEL - Abnormal; Notable for the following components:    Glucose 142 (*)     BUN, Bld 21 (*)     Creatinine 2.5 (*)     eGFR if  32.9 (*)     eGFR if non  28.5 (*)     All other components within normal limits    Narrative:      ADD ON LDH [id number] PER MD VAMSI  10/12/2019  13:25    TROPONIN I - Abnormal; Notable for the following components:    Troponin I 0.081 (*)     All other components within normal limits    Narrative:     ADD ON LDH [id number] PER MD VAMSI  10/12/2019  13:25    B-TYPE NATRIURETIC PEPTIDE - Abnormal; Notable for the following components:     (*)     All other components within normal limits    Narrative:     ADD ON LDH [id number] PER MD VAMSI  10/12/2019  13:25    PROTIME-INR - Abnormal; Notable for the following components:    Prothrombin Time 36.3 (*)     INR 3.7 (*)     All other components within normal limits    Narrative:     ADD ON LDH [id number] PER MD VAMSI  10/12/2019  13:25    LACTIC ACID, PLASMA - Abnormal; Notable for the following components:    Lactate (Lactic Acid) 2.3 (*)     All other components within normal limits   LACTATE DEHYDROGENASE - Abnormal; Notable for the following components:     (*)     All other components within normal limits    Narrative:     ADD ON LDH [id number] PER MD VAMSI  10/12/2019  13:25    LACTATE DEHYDROGENASE     EKG Readings: (Independently Interpreted)   Initial Reading: No STEMI. Previous EKG Date: 7/16/19.   14:21 Appears to be a fine ventricular fibrillation  Compared to ekg from 7/16/19  Pt was previously paced       Imaging Results          X-Ray Chest AP Portable (Final result)  Result time 10/12/19 13:34:44    Final result by Alonso Gruber MD (10/12/19 13:34:44)                 Impression:      Cardiac device and LVAD with slight perihilar interstitial coarsening that may represent pulmonary edema/CHF pattern.  Interstitial pneumonia not excluded.  No large consolidation.      Electronically signed by: Alonso Gruber MD  Date:    10/12/2019  Time:    13:34             Narrative:    EXAMINATION:  XR CHEST AP PORTABLE    CLINICAL HISTORY:  CHF;    TECHNIQUE:  Single frontal view of the chest was performed.    COMPARISON:  Chest  radiograph 07/16/2019    FINDINGS:  Sternotomy wires, left upper chest multi lead cardiac device and partially imaged LVAD appear grossly stable.  Cardiac silhouette is moderately enlarged similar to prior.  Upper mediastinal contours are within normal limits.  Bilateral perihilar slight interstitial coarsening.  Bibasilar platelike scarring versus atelectasis, left greater than right.  Chronic blunting of the left costophrenic angle similar to prior suggesting pleural thickening/scarring.  Chronic nonspecific elevation of the right hemidiaphragm.  No focal consolidation, large pleural effusion or pneumothorax.  No acute osseous process seen.                                 Medical Decision Making:   History:   Old Medical Records: I decided to obtain old medical records.  Initial Assessment:   Defibrillator firing prior to arrival  Pt has not had it fire in 45 minutes, feeling weak but mentating well denies cp or sob  Differential Diagnosis:   Vifb, Vtach, ACS, LVAD malfunction, electrolyte abnormality  Clinical Tests:   Lab Tests: Ordered and Reviewed  Radiological Study: Ordered and Reviewed  ED Management:  I spoke with and consulted Cardiology consulted patient's arrival to the ER and Bridget LVAD coordinator also notified of patient's arrival  Pt appears fluid overloaded  ekg appears to be vfib but defibrillator no longer firing and pt feeling well, takes amiodarone at home, another critically ill patient presented to the ER almost simultaneously to this patient  Cardiology evaluating patient in the room and pt appears to be in sustained vtach which then appeared to possibly be torsades, magnesium and lasix ordered  This coordinated with change of shift and Dr. Marks, oncoming physician placed order for Magnesium and code cart defibrillator placed at bedside as pt may need cardioversion  bp has been stable for LVAD patient  Cardiology to manage and pt will require admission to the CCU               Attending Attestation:         Attending Critical Care:   Critical Care Times:   ==============================================================  · Total Critical Care Time - exclusive of procedural time: 30 minutes.  ==============================================================  Critical care was time spent personally by me on the following activities: discussion with consultants, development of treatment plan with patient or relative, ordering lab, x-rays, and/or EKG, review of old charts, examination of patient and re-evaluation of patient's conition.   Critical Care Condition: life-threatening                  Clinical Impression:       ICD-10-CM ICD-9-CM   1. VT (ventricular tachycardia) I47.2 427.1   2. Shortness of breath R06.02 786.05   3. Heart failure I50.9 428.9   4. Defibrillator discharge Z45.02 V71.89   5. LVAD (left ventricular assist device) present Z95.811 V43.21                                Brittny Mendieta MD  10/12/19 3498

## 2019-10-12 NOTE — ED NOTES
Patient awake and aware of environment appropriately. Speaking with wife who is at the bedside. Continuous pulse oximetry, BP, and cardiac monitoring in place. [employer] monitoring also in place. Code cart at bedside with pads on patient. Call bell within reach. Will continue to monitor.

## 2019-10-12 NOTE — SUBJECTIVE & OBJECTIVE
Past Medical History:   Diagnosis Date    CHF (congestive heart failure)     Dilated cardiomyopathy 1/10/2018    Disorder of kidney and ureter     CKD    Gout     HTN (hypertension)     Ventricular tachycardia (paroxysmal)        Past Surgical History:   Procedure Laterality Date    CARDIAC CATHETERIZATION  Dec. 2012    CARDIAC DEFIBRILLATOR PLACEMENT Left     CRRT-D    COLONOSCOPY N/A 3/6/2018    Procedure: COLONOSCOPY;  Surgeon: Alonso Bone MD;  Location: [facility] (2ND FLR);  Service: Endoscopy;  Laterality: N/A;    COLONOSCOPY N/A 2019    Procedure: COLONOSCOPY;  Surgeon: Blane Valdez MD;  Location: [facility] (2ND FLR);  Service: Endoscopy;  Laterality: N/A;    COLONOSCOPY N/A 2019    Procedure: COLONOSCOPY;  Surgeon: Blane Valdez MD;  Location: [facility] (2ND FLR);  Service: Endoscopy;  Laterality: N/A;    ESOPHAGOGASTRODUODENOSCOPY N/A 2019    Procedure: EGD (ESOPHAGOGASTRODUODENOSCOPY);  Surgeon: Blane Valdez MD;  Location: [facility] (2ND FLR);  Service: Endoscopy;  Laterality: N/A;    ESOPHAGOGASTRODUODENOSCOPY N/A 2019    Procedure: EGD (ESOPHAGOGASTRODUODENOSCOPY);  Surgeon: Blane Valdez MD;  Location: [facility] (2ND FLR);  Service: Endoscopy;  Laterality: N/A;       Review of patient's allergies indicates:   Allergen Reactions    Lisinopril Anaphylaxis         (Not in a hospital admission)  Family History     Problem Relation (Age of Onset)    Cancer Sister (54)    Coronary artery disease Father    Diabetes Father    Heart disease Father    Hypertension Father    No Known Problems Mother, Brother        Tobacco Use    Smoking status: Former Smoker     Packs/day: 1.00     Years: 31.00     Pack years: 31.00     Types: Cigarettes     Last attempt to quit: 2018     Years since quittin.7    Smokeless tobacco: Never Used    Tobacco comment: pt is quiting on his own - pt stated not qualified for program;  pt  quit on his own   Substance and Sexual Activity    Alcohol  use: No     Alcohol/week: 0.0 standard drinks     Comment: one fifth of gin or rum/week    Drug use: No    Sexual activity: Yes     Partners: Female     Birth control/protection: None     Comment: 10/5/17  with same partner 7 years      Review of Systems   Constitution: Positive for malaise/fatigue and weight gain. Negative for chills, decreased appetite, diaphoresis, fever and weight loss.   Eyes: Negative for blurred vision.   Cardiovascular: Positive for chest pain, leg swelling and palpitations. Negative for dyspnea on exertion, irregular heartbeat, near-syncope and orthopnea.   Respiratory: Positive for shortness of breath. Negative for cough, snoring and wheezing.    Gastrointestinal: Negative for abdominal pain, nausea and vomiting.   Genitourinary: Negative for bladder incontinence and urgency.     Objective:     Vital Signs (Most Recent):  Temp: 98.8 °F (37.1 °C) (10/12/19 1237)  Pulse: 76 (10/12/19 1609)  Resp: 18 (10/12/19 1511)  BP: (!) 76/0 (10/12/19 1346)  SpO2: 100 % (10/12/19 1609) Vital Signs (24h Range):  Temp:  [98.8 °F (37.1 °C)] 98.8 °F (37.1 °C)  Pulse:  [76-96] 76  Resp:  [16-20] 18  SpO2:  [91 %-100 %] 100 %  BP: (76)/(0) 76/0     Weight: 126.8 kg (279 lb 8.7 oz)  Body mass index is 36.88 kg/m².    SpO2: 100 %  O2 Device (Oxygen Therapy): room air      Intake/Output Summary (Last 24 hours) at 10/12/2019 1635  Last data filed at 10/12/2019 1615  Gross per 24 hour   Intake 50 ml   Output --   Net 50 ml       Lines/Drains/Airways     Line                 VAD 03/08/18 1124 Left ventricular assist device HeartMate  days          Peripheral Intravenous Line                 Peripheral IV - Single Lumen 10/12/19 1255 18 G Right Forearm less than 1 day         Peripheral IV - Single Lumen 10/12/19 1441 22 G Right Hand less than 1 day                Physical Exam   Constitutional: He is oriented to person, place, and time. He appears well-developed and well-nourished. He appears  distressed.       Neck: JVD present.   Cardiovascular: Normal rate, regular rhythm, normal heart sounds and intact distal pulses. Exam reveals no gallop and no friction rub.   No murmur heard.  Pulmonary/Chest: He is in respiratory distress. He has no wheezes. He has rales. He exhibits no tenderness.   Abdominal: Soft. Bowel sounds are normal. He exhibits no distension and no mass. There is no tenderness. There is no rebound and no guarding.   Musculoskeletal: Normal range of motion. He exhibits edema.   Neurological: He is alert and oriented to person, place, and time.   Skin: Skin is warm. He is diaphoretic. No erythema.   Nursing note and vitals reviewed.      Significant Labs:   CMP   Recent Labs   Lab 10/12/19  1254 10/12/19  1539    134*   K 4.0 4.4   CL 99 100   CO2 24 25   * 119*   BUN 21* 22*   CREATININE 2.5* 2.4*   CALCIUM 9.7 9.3   PROT 7.9  --    ALBUMIN 4.2  --    BILITOT 0.5  --    ALKPHOS 92  --    AST 26  --    ALT 21  --    ANIONGAP 13 9   ESTGFRAFRICA 32.9* 34.6*   EGFRNONAA 28.5* 29.9*   , CBC   Recent Labs   Lab 10/12/19  1254 10/12/19  1539   WBC 6.14 8.15   HGB 12.7* 12.4*   HCT 41.5 40.1    211   , INR   Recent Labs   Lab 10/12/19  1254 10/12/19  1539   INR 3.7* 3.8*    and Lipid Panel No results for input(s): CHOL, HDL, LDLCALC, TRIG, CHOLHDL in the last 48 hours.    Significant Imaging: Echocardiogram:   Transthoracic echo (TTE) complete (Cupid Only):   Results for orders placed or performed during the hospital encounter of 07/16/19   Transthoracic echo (TTE) 2D with Color Flow   Result Value Ref Range    Ascending aorta 3.36 cm    STJ 2.91 cm    IVS 0.86 0.6 - 1.1 cm    LA size 4.05 cm    Left Atrium Major Axis 5.77 cm    Left Atrium Minor Axis 5.89 cm    LVIDD 7.23 (A) 3.5 - 6.0 cm    LVIDS 6.70 (A) 2.1 - 4.0 cm    LVOT diameter 2.06 cm    PW 1.14 (A) 0.6 - 1.1 cm    RA Major Axis 4.53 cm    RA Width 3.41 cm    RVDD 4.36 cm    Sinus 3.40 cm    TAPSE 2.26 cm    TDI  LATERAL 0.04 m/s    LA WIDTH 3.92 cm    LV Diastolic Volume 274.38 mL    LV Systolic Volume 231.48 mL    FS 7 %    LA volume 78.67 cm3    LV mass 340.39 g    Left Ventricle Relative Wall Thickness 0.32 cm    LVOT area 3.3 cm2    LV Systolic Volume Index 97.2 mL/m2    LV Diastolic Volume Index 115.20 mL/m2    LA Volume Index 33.0 mL/m2    LV Mass Index 143 g/m2    BSA 2.45 m2    Right Atrial Pressure (from IVC) 3 mmHg    Narrative    · LVAD present. Base speed is 9800. The pump type is a Heartmate II.  · Severe left ventricular enlargement.  · Severely decreased left ventricular systolic function. The estimated   ejection fraction is 13%  · Eccentric left ventricular hypertrophy.  · The interventricular septum appears to bow into the right ventricle. The   aortic valve does not open.  · Left ventricular diastolic dysfunction.  · Mild right ventricular enlargement.  · Normal right ventricular systolic function.  · Mild mitral regurgitation.  · Normal central venous pressure (3 mm Hg).  · Small pericardial effusion.

## 2019-10-12 NOTE — SUBJECTIVE & OBJECTIVE
Past Medical History:   Diagnosis Date    CHF (congestive heart failure)     Dilated cardiomyopathy 1/10/2018    Disorder of kidney and ureter     CKD    Gout     HTN (hypertension)     Ventricular tachycardia (paroxysmal)        Past Surgical History:   Procedure Laterality Date    CARDIAC CATHETERIZATION  Dec. 2012    CARDIAC DEFIBRILLATOR PLACEMENT Left     CRRT-D    COLONOSCOPY N/A 3/6/2018    Procedure: COLONOSCOPY;  Surgeon: Alonso Bone MD;  Location: [facility] ENDO (2ND FLR);  Service: Endoscopy;  Laterality: N/A;    COLONOSCOPY N/A 2019    Procedure: COLONOSCOPY;  Surgeon: Blane Valdez MD;  Location: [facility] ENDO (2ND FLR);  Service: Endoscopy;  Laterality: N/A;    COLONOSCOPY N/A 2019    Procedure: COLONOSCOPY;  Surgeon: Blane Valdez MD;  Location: [facility] ENDO (2ND FLR);  Service: Endoscopy;  Laterality: N/A;    ESOPHAGOGASTRODUODENOSCOPY N/A 2019    Procedure: EGD (ESOPHAGOGASTRODUODENOSCOPY);  Surgeon: Blane Valdez MD;  Location: [facility] ENDO (2ND FLR);  Service: Endoscopy;  Laterality: N/A;    ESOPHAGOGASTRODUODENOSCOPY N/A 2019    Procedure: EGD (ESOPHAGOGASTRODUODENOSCOPY);  Surgeon: Blane Valdez MD;  Location: [facility] ENDO (2ND FLR);  Service: Endoscopy;  Laterality: N/A;       Review of patient's allergies indicates:   Allergen Reactions    Lisinopril Anaphylaxis         (Not in a hospital admission)  Family History     Problem Relation (Age of Onset)    Cancer Sister (54)    Coronary artery disease Father    Diabetes Father    Heart disease Father    Hypertension Father    No Known Problems Mother, Brother        Tobacco Use    Smoking status: Former Smoker     Packs/day: 1.00     Years: 31.00     Pack years: 31.00     Types: Cigarettes     Last attempt to quit: 2018     Years since quittin.7    Smokeless tobacco: Never Used    Tobacco comment: pt is quiting on his own - pt stated not qualified for program;  pt  quit on his own   Substance and Sexual Activity    Alcohol  use: No     Alcohol/week: 0.0 standard drinks     Comment: one fifth of gin or rum/week    Drug use: No    Sexual activity: Yes     Partners: Female     Birth control/protection: None     Comment: 10/5/17  with same partner 7 years      Review of Systems   Constitution: Positive for malaise/fatigue and weight gain. Negative for chills, decreased appetite, diaphoresis, fever and weight loss.   Eyes: Negative for blurred vision.   Cardiovascular: Positive for chest pain and palpitations. Negative for dyspnea on exertion, irregular heartbeat, leg swelling, near-syncope and orthopnea.   Respiratory: Positive for shortness of breath. Negative for cough, snoring and wheezing.    Gastrointestinal: Negative for abdominal pain, nausea and vomiting.   Genitourinary: Negative for bladder incontinence and urgency.     Objective:     Vital Signs (Most Recent):  Temp: 98.8 °F (37.1 °C) (10/12/19 1237)  Pulse: 82 (10/12/19 1511)  Resp: 18 (10/12/19 1511)  BP: (!) 76/0 (10/12/19 1346)  SpO2: 99 % (10/12/19 1511) Vital Signs (24h Range):  Temp:  [98.8 °F (37.1 °C)] 98.8 °F (37.1 °C)  Pulse:  [82-96] 82  Resp:  [16-20] 18  SpO2:  [91 %-99 %] 99 %  BP: (76)/(0) 76/0     Weight: 126.8 kg (279 lb 8.7 oz)  Body mass index is 36.88 kg/m².    SpO2: 99 %  O2 Device (Oxygen Therapy): room air    No intake or output data in the 24 hours ending 10/12/19 1606    Lines/Drains/Airways     Line                 VAD 03/08/18 1124 Left ventricular assist device HeartMate  days          Peripheral Intravenous Line                 Peripheral IV - Single Lumen 10/12/19 1255 18 G Right Forearm less than 1 day         Peripheral IV - Single Lumen 10/12/19 1441 22 G Right Hand less than 1 day                Physical Exam    Significant Labs:   BMP:   Recent Labs   Lab 10/12/19  1254   *      K 4.0   CL 99   CO2 24   BUN 21*   CREATININE 2.5*   CALCIUM 9.7   , CMP   Recent Labs   Lab 10/12/19  1254      K 4.0   CL 99   CO2  24   *   BUN 21*   CREATININE 2.5*   CALCIUM 9.7   PROT 7.9   ALBUMIN 4.2   BILITOT 0.5   ALKPHOS 92   AST 26   ALT 21   ANIONGAP 13   ESTGFRAFRICA 32.9*   EGFRNONAA 28.5*   , CBC   Recent Labs   Lab 10/12/19  1254 10/12/19  1539   WBC 6.14 8.15   HGB 12.7* 12.4*   HCT 41.5 40.1    211   , INR   Recent Labs   Lab 10/12/19  1254 10/12/19  1539   INR 3.7* 3.8*    and Lipid Panel No results for input(s): CHOL, HDL, LDLCALC, TRIG, CHOLHDL in the last 48 hours.    Significant Imaging: Echocardiogram:   Transthoracic echo (TTE) complete (Cupid Only):   Results for orders placed or performed during the hospital encounter of 07/16/19   Transthoracic echo (TTE) 2D with Color Flow   Result Value Ref Range    Ascending aorta 3.36 cm    STJ 2.91 cm    IVS 0.86 0.6 - 1.1 cm    LA size 4.05 cm    Left Atrium Major Axis 5.77 cm    Left Atrium Minor Axis 5.89 cm    LVIDD 7.23 (A) 3.5 - 6.0 cm    LVIDS 6.70 (A) 2.1 - 4.0 cm    LVOT diameter 2.06 cm    PW 1.14 (A) 0.6 - 1.1 cm    RA Major Axis 4.53 cm    RA Width 3.41 cm    RVDD 4.36 cm    Sinus 3.40 cm    TAPSE 2.26 cm    TDI LATERAL 0.04 m/s    LA WIDTH 3.92 cm    LV Diastolic Volume 274.38 mL    LV Systolic Volume 231.48 mL    FS 7 %    LA volume 78.67 cm3    LV mass 340.39 g    Left Ventricle Relative Wall Thickness 0.32 cm    LVOT area 3.3 cm2    LV Systolic Volume Index 97.2 mL/m2    LV Diastolic Volume Index 115.20 mL/m2    LA Volume Index 33.0 mL/m2    LV Mass Index 143 g/m2    BSA 2.45 m2    Right Atrial Pressure (from IVC) 3 mmHg    Narrative    · LVAD present. Base speed is 9800. The pump type is a Heartmate II.  · Severe left ventricular enlargement.  · Severely decreased left ventricular systolic function. The estimated   ejection fraction is 13%  · Eccentric left ventricular hypertrophy.  · The interventricular septum appears to bow into the right ventricle. The   aortic valve does not open.  · Left ventricular diastolic dysfunction.  · Mild right  ventricular enlargement.  · Normal right ventricular systolic function.  · Mild mitral regurgitation.  · Normal central venous pressure (3 mm Hg).  · Small pericardial effusion.

## 2019-10-12 NOTE — ASSESSMENT & PLAN NOTE
Carol Ann on CKD , likely d/t Cardiorenal   - Hold Nephrotoxic medications, no Contrast, Keep normotensive and euvolemic    - Closely monitor Cr, BUN  - Consider nephrology consultation in AM    - If does not improve please obtain Urinelytes, UACC, Renal U/s

## 2019-10-12 NOTE — ED NOTES
Patient identifiers for Tim Richards 53 y.o. male checked and correct.  Chief Complaint   Patient presents with    AICD Problem     defib shock 5 times on way here. LVAD patient. States could feel himself passing out just prior to shocks.      Past Medical History:   Diagnosis Date    CHF (congestive heart failure)     Dilated cardiomyopathy 1/10/2018    Disorder of kidney and ureter     CKD    Gout     HTN (hypertension)     Ventricular tachycardia (paroxysmal)      Allergies reported:   Review of patient's allergies indicates:   Allergen Reactions    Lisinopril Anaphylaxis         LOC: Patient is awake, alert, and aware of environment with an appropriate affect. Patient is oriented x 4 and speaking appropriately.  APPEARANCE: Patient resting comfortably and in no acute distress. Patient is clean and well groomed, patient's clothing is properly fastened.  HEENT: Patient wears glasses. States feeling neck is bulging; tender.  SKIN: The skin is warm and dry. Patient has normal skin turgor and moist mucus membranes.   MUSKULOSKELETAL: Patient is moving all extremities well, no obvious deformities noted. Pulses intact. Reports feeling weak. Ambulatory by self.  RESPIRATORY: Airway is open and patent. Respirations are spontaneous and non-labored with normal effort and rate. Patient reports feeling SOB. 97% oxygen saturation on room air.  CARDIAC: Patient has a normal rate and rhythm. 94 bpm on monitor. Defibrillator went off 5 times; last time 45 minutes ago. Patient has a LVAD - placed a year and half ago (denies any problems with the LVAD). Bilateral peripheral edema noted to bilateral lower extremities; pitting, +2. Reports gained weight of 14lbs. Reports no change since physician changed dosage of diuretic. Reports chest pain; 2/10, believes residual pain from shocks.  ABDOMEN: No distention noted. Soft and non-tender upon palpation. Denies nausea and vomiting.  NEUROLOGICAL: PERRL. Facial expression  is symmetrical. Hand grasps are equal bilaterally. Normal sensation in all extremities when touched with finger. Patient denies headache. Reports feeling light-headed.

## 2019-10-12 NOTE — ASSESSMENT & PLAN NOTE
-53 yr old male with HM 2 LVAD DT at 9800, H/o VT shocks, AICD admitted for AICD firing  - AICD firing 5 times prior to arrival  - In ED AICD interrogation: 10/12/2019: time 11:52: ATP x 5 / DCCV x 6   MMVT ()  S/p unsuccessful ATP x3 , followed by unsuccessful DCCV x 1, followed unsuccessful ATP burst x2 which degenerated into VF s/p unsuccessful DCCV x 5  Underlying rhythm noted to be VF, defib pads placed, loaded Amiodarone 150mg x2, Lidocaine 100mg, Magnesium 2mg, Versed 4mg / Fentanyl 100mcg. Given Unsynchronized 120V DCCV, which was successful, current rhythm AS- 70.   - EP consult appreciated, amio gtt held. C/w lower dose amiodarone and will do testing with lower dose.  - DC amio gtt  - Replace electrolytes,   - Admit to ICU  - TTE

## 2019-10-12 NOTE — ED NOTES
Patient resting in bed with wife sitting at the bedside. Deny any requests. Continuous pulse oximetry, BP, and cardiac monitoring in place. Call bell within reach. Will continue to monitor.

## 2019-10-12 NOTE — ED NOTES
Dr. Denise at bedside; patient in V-fibrillation. Continuous pulse oximetry and cardiac monitoring in place. Code cart in room and pads placed on patient. Charge nurse and 2 RNs at bedside providing care.

## 2019-10-12 NOTE — SUBJECTIVE & OBJECTIVE
Past Medical History:   Diagnosis Date    CHF (congestive heart failure)     Dilated cardiomyopathy 1/10/2018    Disorder of kidney and ureter     CKD    Gout     HTN (hypertension)     Ventricular tachycardia (paroxysmal)        Past Surgical History:   Procedure Laterality Date    CARDIAC CATHETERIZATION  Dec. 2012    CARDIAC DEFIBRILLATOR PLACEMENT Left     CRRT-D    COLONOSCOPY N/A 3/6/2018    Procedure: COLONOSCOPY;  Surgeon: Alonso Bone MD;  Location: [facility] ENDO (2ND FLR);  Service: Endoscopy;  Laterality: N/A;    COLONOSCOPY N/A 7/17/2019    Procedure: COLONOSCOPY;  Surgeon: Blane Valdez MD;  Location: [facility] ENDO (2ND FLR);  Service: Endoscopy;  Laterality: N/A;    COLONOSCOPY N/A 7/18/2019    Procedure: COLONOSCOPY;  Surgeon: Blane Valdez MD;  Location: [facility] ENDO (2ND FLR);  Service: Endoscopy;  Laterality: N/A;    ESOPHAGOGASTRODUODENOSCOPY N/A 7/17/2019    Procedure: EGD (ESOPHAGOGASTRODUODENOSCOPY);  Surgeon: Blane Valdez MD;  Location: [facility] ENDO (2ND FLR);  Service: Endoscopy;  Laterality: N/A;    ESOPHAGOGASTRODUODENOSCOPY N/A 7/18/2019    Procedure: EGD (ESOPHAGOGASTRODUODENOSCOPY);  Surgeon: Blane Valdez MD;  Location: [facility] (2ND FLR);  Service: Endoscopy;  Laterality: N/A;       Review of patient's allergies indicates:   Allergen Reactions    Lisinopril Anaphylaxis       Current Facility-Administered Medications   Medication    amiodarone 360 mg/200 mL (1.8 mg/mL) infusion    [START ON 10/13/2019] amLODIPine tablet 10 mg    [START ON 10/13/2019] ferrous gluconate tablet 324 mg    furosemide (LASIX) 2 mg/mL in sodium chloride 0.9% 100 mL infusion (conc: 2 mg/mL)    magnesium oxide tablet 400 mg    mesalamine CR capsule 1,000 mg    [START ON 10/13/2019] pantoprazole EC tablet 40 mg    potassium chloride SA CR tablet 20 mEq    sodium chloride 0.9% flush 10 mL     Current Outpatient Medications   Medication Sig    allopurinol (ZYLOPRIM) 100 MG tablet TAKE 1 TABLET BY MOUTH EVERY DAY     amiodarone (PACERONE) 200 MG Tab TAKE 1 TABLET (200 MG TOTAL) BY MOUTH ONCE DAILY.    amLODIPine (NORVASC) 10 MG tablet Take 1 tablet (10 mg total) by mouth once daily.    doxazosin (CARDURA) 8 MG Tab Take 1 tablet (8 mg total) by mouth every 12 (twelve) hours.    ferrous gluconate (FERGON) 324 MG tablet Take 1 tablet (324 mg total) by mouth daily with breakfast.    magnesium oxide (MAG-OX) 400 mg (241.3 mg magnesium) tablet TAKE 1 TABLET (400 MG TOTAL) BY MOUTH 3 (THREE) TIMES DAILY.    mesalamine (PENTASA) 250 mg CpSR Take 4 capsules (1,000 mg total) by mouth 4 (four) times daily.    pantoprazole (PROTONIX) 40 MG tablet TAKE 1 TABLET (40 MG TOTAL) BY MOUTH ONCE DAILY.    potassium chloride (K-TAB) 20 mEq Take 1 tablet (20 mEq total) by mouth 2 (two) times daily.    torsemide (DEMADEX) 20 MG Tab Take 40 mg (2 tabs) qam and 20 mg (1 tab) qpm for 3 days then 40 mg (2 tabs) daily.    warfarin (COUMADIN) 5 MG tablet Take 1.5 tabs by mouth on  only, followed by weekly dose of 1 tablet daily, except 1.5 tabs on Mon, Wed, Fri    sildenafil (VIAGRA) 100 MG tablet Take 1 tablet (100 mg total) by mouth daily as needed for Erectile Dysfunction.     Family History     Problem Relation (Age of Onset)    Cancer Sister (54)    Coronary artery disease Father    Diabetes Father    Heart disease Father    Hypertension Father    No Known Problems Mother, Brother        Tobacco Use    Smoking status: Former Smoker     Packs/day: 1.00     Years: 31.00     Pack years: 31.00     Types: Cigarettes     Last attempt to quit: 2018     Years since quittin.7    Smokeless tobacco: Never Used    Tobacco comment: pt is quiting on his own - pt stated not qualified for program;  pt  quit on his own   Substance and Sexual Activity    Alcohol use: No     Alcohol/week: 0.0 standard drinks     Comment: one fifth of gin or rum/week    Drug use: No    Sexual activity: Yes     Partners: Female     Birth  control/protection: None     Comment: 10/5/17  with same partner 7 years      Review of Systems   Constitutional: Positive for activity change, fatigue and unexpected weight change. Negative for fever.   HENT: Negative for voice change.    Eyes: Negative for visual disturbance.   Respiratory: Positive for shortness of breath.    Cardiovascular: Positive for palpitations and leg swelling. Negative for chest pain.   Gastrointestinal: Positive for blood in stool. Negative for abdominal distention, abdominal pain, nausea and vomiting.   Endocrine: Negative for polyuria.   Genitourinary: Positive for decreased urine volume.   Neurological: Positive for dizziness and light-headedness. Negative for syncope.   Psychiatric/Behavioral: Negative for agitation.     Objective:     Vital Signs (Most Recent):  Temp: 98.8 °F (37.1 °C) (10/12/19 1237)  Pulse: 79 (10/12/19 1658)  Resp: 18 (10/12/19 1511)  BP: (!) 76/0 (10/12/19 1346)  SpO2: 100 % (10/12/19 1658) Vital Signs (24h Range):  Temp:  [98.8 °F (37.1 °C)] 98.8 °F (37.1 °C)  Pulse:  [76-96] 79  Resp:  [16-20] 18  SpO2:  [91 %-100 %] 100 %  BP: (76)/(0) 76/0     Patient Vitals for the past 72 hrs (Last 3 readings):   Weight   10/12/19 1339 126.8 kg (279 lb 8.7 oz)   10/12/19 1237 126.6 kg (279 lb)     Body mass index is 36.88 kg/m².      Intake/Output Summary (Last 24 hours) at 10/12/2019 1714  Last data filed at 10/12/2019 1650  Gross per 24 hour   Intake 50 ml   Output 350 ml   Net -300 ml       Physical Exam   Constitutional: He is oriented to person, place, and time. He appears well-developed.   HENT:   Mouth/Throat: Oropharynx is clear and moist.   Neck: JVD (Mid neck) present.   Cardiovascular: Normal rate, regular rhythm and intact distal pulses.   Smooth VAD hum   Pulmonary/Chest: Effort normal and breath sounds normal. No respiratory distress. He has no rales.   Abdominal: Soft. Bowel sounds are normal. He exhibits no distension. There is no tenderness. There  is no guarding.   Musculoskeletal: He exhibits no edema (3+).   Neurological: He is alert and oriented to person, place, and time.   Skin: Skin is warm.   Psychiatric: He has a normal mood and affect.   Nursing note and vitals reviewed.      Significant Labs:  CBC:  Recent Labs   Lab 10/10/19  1146 10/12/19  1254 10/12/19  1539   WBC 5.30 6.14 8.15   RBC 4.61 4.85 4.54*   HGB 12.5* 12.7* 12.4*   HCT 39.2* 41.5 40.1    200 211   MCV 85 86 88   MCH 27.1 26.2* 27.3   MCHC 31.9* 30.6* 30.9*     BNP:  Recent Labs   Lab 10/10/19  1146 10/12/19  1254   * 809*     CMP:  Recent Labs   Lab 10/10/19  1146 10/12/19  1254 10/12/19  1539   GLU 96 142* 119*   CALCIUM 9.1 9.7 9.3   ALBUMIN 4.6 4.2  --    PROT 8.4 7.9  --    * 136 134*   K 3.6 4.0 4.4   CO2 28 24 25   CL 98* 99 100   BUN 22* 21* 22*   CREATININE 2.1* 2.5* 2.4*   ALKPHOS 80 92  --    ALT 23 21  --    AST 27 26  --    BILITOT 0.6 0.5  --       Coagulation:   Recent Labs   Lab 10/10/19  1146 10/12/19  1254 10/12/19  1539   INR 2.6* 3.7* 3.8*   APTT  --   --  42.8*     LDH:  Recent Labs   Lab 10/10/19  1146 10/12/19  1325   * 302*     Microbiology:  Microbiology Results (last 7 days)     ** No results found for the last 168 hours. **          BMP:   Recent Labs   Lab 10/12/19  1539   *   *   K 4.4      CO2 25   BUN 22*   CREATININE 2.4*   CALCIUM 9.3   MG 3.4*     Cardiac Markers: No results for input(s): CKMB, TROPONINT, MYOGLOBIN in the last 72 hours.  Coagulation:   Recent Labs   Lab 10/12/19  1539   INR 3.8*   APTT 42.8*     I have reviewed all pertinent labs within the past 24 hours.    Diagnostic Results:  I have reviewed all pertinent imaging results/findings within the past 24 hours.

## 2019-10-12 NOTE — Clinical Note
VF induced by pt's device, 1 shock delivered via pt's ICD, unsuccessful conversion to NSR, 2nd shock delivered by pt's ICD, successful restoration of NSR with second shock

## 2019-10-12 NOTE — ASSESSMENT & PLAN NOTE
Hx of VT, BiV-ICD Medtronic:    - PTA Amiodarone 200mg qday    - Last VT 10/2018 due to ADHF     - EP consulted, appreciate assistance  - Continue Amiodarone IV 1mg/min  - Start Lidocaine if needed  - Keep Pads and Medtronic  at bedside

## 2019-10-13 LAB
ANION GAP SERPL CALC-SCNC: 10 MMOL/L (ref 8–16)
ANION GAP SERPL CALC-SCNC: 11 MMOL/L (ref 8–16)
ANION GAP SERPL CALC-SCNC: 11 MMOL/L (ref 8–16)
ANION GAP SERPL CALC-SCNC: 9 MMOL/L (ref 8–16)
APTT BLDCRRT: 47.8 SEC (ref 21–32)
ASCENDING AORTA: 3.02 CM
BASOPHILS # BLD AUTO: 0.01 K/UL (ref 0–0.2)
BASOPHILS NFR BLD: 0.1 % (ref 0–1.9)
BSA FOR ECHO PROCEDURE: 2.55 M2
BUN SERPL-MCNC: 18 MG/DL (ref 6–20)
BUN SERPL-MCNC: 18 MG/DL (ref 6–20)
BUN SERPL-MCNC: 19 MG/DL (ref 6–20)
BUN SERPL-MCNC: 22 MG/DL (ref 6–20)
CALCIUM SERPL-MCNC: 8.9 MG/DL (ref 8.7–10.5)
CALCIUM SERPL-MCNC: 8.9 MG/DL (ref 8.7–10.5)
CALCIUM SERPL-MCNC: 9 MG/DL (ref 8.7–10.5)
CALCIUM SERPL-MCNC: 9 MG/DL (ref 8.7–10.5)
CHLORIDE SERPL-SCNC: 100 MMOL/L (ref 95–110)
CHLORIDE SERPL-SCNC: 100 MMOL/L (ref 95–110)
CHLORIDE SERPL-SCNC: 98 MMOL/L (ref 95–110)
CHLORIDE SERPL-SCNC: 99 MMOL/L (ref 95–110)
CO2 SERPL-SCNC: 26 MMOL/L (ref 23–29)
CO2 SERPL-SCNC: 28 MMOL/L (ref 23–29)
CO2 SERPL-SCNC: 28 MMOL/L (ref 23–29)
CO2 SERPL-SCNC: 29 MMOL/L (ref 23–29)
CREAT SERPL-MCNC: 1.9 MG/DL (ref 0.5–1.4)
CREAT SERPL-MCNC: 1.9 MG/DL (ref 0.5–1.4)
CREAT SERPL-MCNC: 2.1 MG/DL (ref 0.5–1.4)
CREAT SERPL-MCNC: 2.3 MG/DL (ref 0.5–1.4)
CV ECHO LV RWT: 0.21 CM
DIFFERENTIAL METHOD: ABNORMAL
DOP CALC LVOT AREA: 4.8 CM2
DOP CALC LVOT DIAMETER: 2.48 CM
E WAVE DECELERATION TIME: 139.12 MSEC
E/A RATIO: 1.22
E/E' RATIO: 18.67 M/S
ECHO LV POSTERIOR WALL: 1.1 CM (ref 0.6–1.1)
EOSINOPHIL # BLD AUTO: 0 K/UL (ref 0–0.5)
EOSINOPHIL NFR BLD: 0.3 % (ref 0–8)
ERYTHROCYTE [DISTWIDTH] IN BLOOD BY AUTOMATED COUNT: 15.3 % (ref 11.5–14.5)
EST. GFR  (AFRICAN AMERICAN): 36.4 ML/MIN/1.73 M^2
EST. GFR  (AFRICAN AMERICAN): 40.6 ML/MIN/1.73 M^2
EST. GFR  (AFRICAN AMERICAN): 45.8 ML/MIN/1.73 M^2
EST. GFR  (AFRICAN AMERICAN): 45.8 ML/MIN/1.73 M^2
EST. GFR  (NON AFRICAN AMERICAN): 31.5 ML/MIN/1.73 M^2
EST. GFR  (NON AFRICAN AMERICAN): 35.1 ML/MIN/1.73 M^2
EST. GFR  (NON AFRICAN AMERICAN): 39.7 ML/MIN/1.73 M^2
EST. GFR  (NON AFRICAN AMERICAN): 39.7 ML/MIN/1.73 M^2
FRACTIONAL SHORTENING: 8 % (ref 28–44)
GLUCOSE SERPL-MCNC: 105 MG/DL (ref 70–110)
GLUCOSE SERPL-MCNC: 106 MG/DL (ref 70–110)
GLUCOSE SERPL-MCNC: 125 MG/DL (ref 70–110)
GLUCOSE SERPL-MCNC: 125 MG/DL (ref 70–110)
HCT VFR BLD AUTO: 38.4 % (ref 40–54)
HGB BLD-MCNC: 11.3 G/DL (ref 14–18)
IMM GRANULOCYTES # BLD AUTO: 0.02 K/UL (ref 0–0.04)
IMM GRANULOCYTES NFR BLD AUTO: 0.3 % (ref 0–0.5)
INR PPP: 4.3 (ref 0.8–1.2)
INTERVENTRICULAR SEPTUM: 0.76 CM (ref 0.6–1.1)
LA MAJOR: 7.79 CM
LA MINOR: 7.79 CM
LA WIDTH: 5.26 CM
LACTATE SERPL-SCNC: 0.8 MMOL/L (ref 0.5–2.2)
LEFT ATRIUM SIZE: 5.96 CM
LEFT ATRIUM VOLUME INDEX: 83.7 ML/M2
LEFT ATRIUM VOLUME: 207.58 CM3
LEFT INTERNAL DIMENSION IN SYSTOLE: 9.57 CM (ref 2.1–4)
LEFT VENTRICLE DIASTOLIC VOLUME INDEX: 248.05 ML/M2
LEFT VENTRICLE DIASTOLIC VOLUME: 614.86 ML
LEFT VENTRICLE MASS INDEX: 241 G/M2
LEFT VENTRICLE SYSTOLIC VOLUME INDEX: 206.9 ML/M2
LEFT VENTRICLE SYSTOLIC VOLUME: 512.95 ML
LEFT VENTRICULAR INTERNAL DIMENSION IN DIASTOLE: 10.4 CM (ref 3.5–6)
LEFT VENTRICULAR MASS: 597.9 G
LV LATERAL E/E' RATIO: 16.8 M/S
LV SEPTAL E/E' RATIO: 21 M/S
LYMPHOCYTES # BLD AUTO: 0.6 K/UL (ref 1–4.8)
LYMPHOCYTES NFR BLD: 7.8 % (ref 18–48)
MAGNESIUM SERPL-MCNC: 2 MG/DL (ref 1.6–2.6)
MAGNESIUM SERPL-MCNC: 2 MG/DL (ref 1.6–2.6)
MAGNESIUM SERPL-MCNC: 2.1 MG/DL (ref 1.6–2.6)
MAGNESIUM SERPL-MCNC: 2.3 MG/DL (ref 1.6–2.6)
MCH RBC QN AUTO: 26.3 PG (ref 27–31)
MCHC RBC AUTO-ENTMCNC: 29.4 G/DL (ref 32–36)
MCV RBC AUTO: 89 FL (ref 82–98)
MONOCYTES # BLD AUTO: 1 K/UL (ref 0.3–1)
MONOCYTES NFR BLD: 13.7 % (ref 4–15)
MV PEAK A VEL: 0.69 M/S
MV PEAK E VEL: 0.84 M/S
NEUTROPHILS # BLD AUTO: 5.7 K/UL (ref 1.8–7.7)
NEUTROPHILS NFR BLD: 77.8 % (ref 38–73)
NRBC BLD-RTO: 0 /100 WBC
PHOSPHATE SERPL-MCNC: 3.7 MG/DL (ref 2.7–4.5)
PISA TR MAX VEL: 2.96 M/S
PLATELET # BLD AUTO: 191 K/UL (ref 150–350)
PMV BLD AUTO: 10.6 FL (ref 9.2–12.9)
POTASSIUM SERPL-SCNC: 3.2 MMOL/L (ref 3.5–5.1)
POTASSIUM SERPL-SCNC: 3.4 MMOL/L (ref 3.5–5.1)
POTASSIUM SERPL-SCNC: 3.4 MMOL/L (ref 3.5–5.1)
POTASSIUM SERPL-SCNC: 3.9 MMOL/L (ref 3.5–5.1)
PROTHROMBIN TIME: 41.9 SEC (ref 9–12.5)
RA MAJOR: 6.2 CM
RA PRESSURE: 8 MMHG
RA WIDTH: 5.91 CM
RBC # BLD AUTO: 4.3 M/UL (ref 4.6–6.2)
RIGHT VENTRICULAR END-DIASTOLIC DIMENSION: 4.18 CM
RV TISSUE DOPPLER FREE WALL SYSTOLIC VELOCITY 1 (APICAL 4 CHAMBER VIEW): 8.74 CM/S
SINUS: 3.08 CM
SODIUM SERPL-SCNC: 136 MMOL/L (ref 136–145)
SODIUM SERPL-SCNC: 136 MMOL/L (ref 136–145)
SODIUM SERPL-SCNC: 138 MMOL/L (ref 136–145)
SODIUM SERPL-SCNC: 139 MMOL/L (ref 136–145)
STJ: 2.6 CM
TDI LATERAL: 0.05 M/S
TDI SEPTAL: 0.04 M/S
TDI: 0.05 M/S
TR MAX PG: 35 MMHG
TRICUSPID ANNULAR PLANE SYSTOLIC EXCURSION: 1.36 CM
TV REST PULMONARY ARTERY PRESSURE: 43 MMHG
WBC # BLD AUTO: 7.31 K/UL (ref 3.9–12.7)

## 2019-10-13 PROCEDURE — 83735 ASSAY OF MAGNESIUM: CPT

## 2019-10-13 PROCEDURE — 99900035 HC TECH TIME PER 15 MIN (STAT)

## 2019-10-13 PROCEDURE — 85730 THROMBOPLASTIN TIME PARTIAL: CPT

## 2019-10-13 PROCEDURE — 85610 PROTHROMBIN TIME: CPT

## 2019-10-13 PROCEDURE — 83735 ASSAY OF MAGNESIUM: CPT | Mod: 91

## 2019-10-13 PROCEDURE — 27000248 HC VAD-ADDITIONAL DAY

## 2019-10-13 PROCEDURE — 83605 ASSAY OF LACTIC ACID: CPT

## 2019-10-13 PROCEDURE — 80048 BASIC METABOLIC PNL TOTAL CA: CPT | Mod: 91

## 2019-10-13 PROCEDURE — 84100 ASSAY OF PHOSPHORUS: CPT

## 2019-10-13 PROCEDURE — 25000003 PHARM REV CODE 250: Performed by: STUDENT IN AN ORGANIZED HEALTH CARE EDUCATION/TRAINING PROGRAM

## 2019-10-13 PROCEDURE — 99233 PR SUBSEQUENT HOSPITAL CARE,LEVL III: ICD-10-PCS | Mod: 25,,, | Performed by: INTERNAL MEDICINE

## 2019-10-13 PROCEDURE — 80048 BASIC METABOLIC PNL TOTAL CA: CPT

## 2019-10-13 PROCEDURE — 25000003 PHARM REV CODE 250: Performed by: INTERNAL MEDICINE

## 2019-10-13 PROCEDURE — 99233 SBSQ HOSP IP/OBS HIGH 50: CPT | Mod: 25,,, | Performed by: INTERNAL MEDICINE

## 2019-10-13 PROCEDURE — 93750 PR INTERROGATE VENT ASSIST DEV, IN PERSON, W PHYSICIAN ANALYSIS: ICD-10-PCS | Mod: ,,, | Performed by: INTERNAL MEDICINE

## 2019-10-13 PROCEDURE — 63600175 PHARM REV CODE 636 W HCPCS: Performed by: STUDENT IN AN ORGANIZED HEALTH CARE EDUCATION/TRAINING PROGRAM

## 2019-10-13 PROCEDURE — 27000221 HC OXYGEN, UP TO 24 HOURS

## 2019-10-13 PROCEDURE — 93750 INTERROGATION VAD IN PERSON: CPT | Mod: ,,, | Performed by: INTERNAL MEDICINE

## 2019-10-13 PROCEDURE — 20000000 HC ICU ROOM

## 2019-10-13 PROCEDURE — 82803 BLOOD GASES ANY COMBINATION: CPT

## 2019-10-13 PROCEDURE — 94761 N-INVAS EAR/PLS OXIMETRY MLT: CPT

## 2019-10-13 PROCEDURE — 85025 COMPLETE CBC W/AUTO DIFF WBC: CPT

## 2019-10-13 RX ORDER — POTASSIUM CHLORIDE 20 MEQ/1
20 TABLET, EXTENDED RELEASE ORAL ONCE
Status: COMPLETED | OUTPATIENT
Start: 2019-10-13 | End: 2019-10-13

## 2019-10-13 RX ORDER — POTASSIUM CHLORIDE 750 MG/1
30 CAPSULE, EXTENDED RELEASE ORAL ONCE
Status: COMPLETED | OUTPATIENT
Start: 2019-10-13 | End: 2019-10-13

## 2019-10-13 RX ORDER — FUROSEMIDE 10 MG/ML
INJECTION INTRAMUSCULAR; INTRAVENOUS
Status: COMPLETED
Start: 2019-10-13 | End: 2019-10-13

## 2019-10-13 RX ORDER — POTASSIUM CHLORIDE 20 MEQ/1
40 TABLET, EXTENDED RELEASE ORAL ONCE
Status: COMPLETED | OUTPATIENT
Start: 2019-10-13 | End: 2019-10-13

## 2019-10-13 RX ORDER — AMIODARONE HYDROCHLORIDE 200 MG/1
200 TABLET ORAL DAILY
Status: DISCONTINUED | OUTPATIENT
Start: 2019-10-14 | End: 2019-10-18 | Stop reason: HOSPADM

## 2019-10-13 RX ADMIN — FUROSEMIDE 40 MG/HR: 10 INJECTION, SOLUTION INTRAMUSCULAR; INTRAVENOUS at 01:10

## 2019-10-13 RX ADMIN — MAGNESIUM OXIDE TAB 400 MG (241.3 MG ELEMENTAL MG) 400 MG: 400 (241.3 MG) TAB at 08:10

## 2019-10-13 RX ADMIN — POTASSIUM CHLORIDE 20 MEQ: 20 TABLET, EXTENDED RELEASE ORAL at 08:10

## 2019-10-13 RX ADMIN — FUROSEMIDE 40 MG/HR: 10 INJECTION, SOLUTION INTRAMUSCULAR; INTRAVENOUS at 04:10

## 2019-10-13 RX ADMIN — PANTOPRAZOLE SODIUM 40 MG: 40 TABLET, DELAYED RELEASE ORAL at 08:10

## 2019-10-13 RX ADMIN — POTASSIUM CHLORIDE 20 MEQ: 20 TABLET, EXTENDED RELEASE ORAL at 01:10

## 2019-10-13 RX ADMIN — FERROUS GLUCONATE TAB 324 MG (37.5 MG ELEMENTAL IRON) 324 MG: 324 (37.5 FE) TAB at 11:10

## 2019-10-13 RX ADMIN — AMIODARONE HYDROCHLORIDE 1 MG/MIN: 1.8 INJECTION, SOLUTION INTRAVENOUS at 08:10

## 2019-10-13 RX ADMIN — MAGNESIUM OXIDE TAB 400 MG (241.3 MG ELEMENTAL MG) 400 MG: 400 (241.3 MG) TAB at 03:10

## 2019-10-13 RX ADMIN — MAGNESIUM OXIDE TAB 400 MG (241.3 MG ELEMENTAL MG) 400 MG: 400 (241.3 MG) TAB at 09:10

## 2019-10-13 RX ADMIN — POTASSIUM CHLORIDE 40 MEQ: 20 TABLET, EXTENDED RELEASE ORAL at 12:10

## 2019-10-13 RX ADMIN — POTASSIUM CHLORIDE 40 MEQ: 20 TABLET, EXTENDED RELEASE ORAL at 10:10

## 2019-10-13 RX ADMIN — AMIODARONE HYDROCHLORIDE 1 MG/MIN: 1.8 INJECTION, SOLUTION INTRAVENOUS at 01:10

## 2019-10-13 RX ADMIN — POTASSIUM CHLORIDE 30 MEQ: 750 CAPSULE, EXTENDED RELEASE ORAL at 04:10

## 2019-10-13 RX ADMIN — AMLODIPINE BESYLATE 10 MG: 10 TABLET ORAL at 08:10

## 2019-10-13 RX ADMIN — POTASSIUM CHLORIDE 20 MEQ: 20 TABLET, EXTENDED RELEASE ORAL at 09:10

## 2019-10-13 RX ADMIN — MESALAMINE 1000 MG: 250 CAPSULE ORAL at 11:10

## 2019-10-13 RX ADMIN — MESALAMINE 1000 MG: 250 CAPSULE ORAL at 09:10

## 2019-10-13 RX ADMIN — MESALAMINE 1000 MG: 250 CAPSULE ORAL at 04:10

## 2019-10-13 RX ADMIN — FUROSEMIDE 40 MG/HR: 10 INJECTION, SOLUTION INTRAMUSCULAR; INTRAVENOUS at 08:10

## 2019-10-13 NOTE — NURSING TRANSFER
Admitted pt in CMICU; cardiac monitor applied, patient oriented to room, call bell in reach and bed in lowest position; Asked Dr. Kirit Moise if he wants to continue Amidarone drip at 1mg/ml; MD ordered to maintain amiodarone at current rate because pt had episode of VF/VT storm and ICD was interrogated in ER; will continue to monitor.

## 2019-10-13 NOTE — PROGRESS NOTES
10/13/2019  Casper Harris    Current provider:  Antonio Hadley Jr.,*      I, Casper Harris, rounded on Tim Richards to ensure all mechanical assist device settings (IABP or VAD) were appropriate and all parameters were within limits.  I was able to ensure all back up equipment was present, the staff had no issues, and the Perfusion Department daily rounding was complete.    3:08 AM

## 2019-10-13 NOTE — PLAN OF CARE
CMICU DAILY GOALS       A: Awake    RASS: Goal -  0-->alert and calm  Actual - RASS (Newman Agitation-Sedation Scale): 0-->alert and calm   Restraint necessity:    B: Breath   SBT: Not intubated   C: Coordinate A & B, analgesics/sedatives   Pain: managed    SAT: Not intubated  D: Delirium   CAM-ICU: Overall CAM-ICU: Negative  E: Early Mobility   MOVE Screen: Pass   Activity: Activity Management: activity adjusted per tolerance  FAS: Feeding/Nutrition   Diet order: Diet/Nutrition Received: low saturated fat/low cholesterol, 2 gram sodium,   Fluid restriction:    T: Thrombus   DVT prophylaxis: VTE Required Core Measure: Patient refused interventions  H: HOB Elevation   Head of Bed (HOB): HOB at 30-45 degrees  U: Ulcer Prophylaxis   GI: yes  G: Glucose control   managed    S: Skin   Bundle compliance: yes   Bathing/Skin Care: bath, chlorhexidine, bath, complete, linen changed Date: 10/13/19 night shift  B: Bowel Function   no issues   I: Indwelling Catheters   [place] necessity: No   CVC necessity: No   IPAD offered: Yes  D: De-escalation Antibx   Not applicable   Plan for the day   Continue Diuretics; Monitor cardiac rythm  Family/Goals of care/Code Status   Code Status: Full Code     No acute events throughout night, KCL 30meqs PO given; No alarms noted from LVAD; VS and assessment per flow sheet, patient progressing towards goals as tolerated, plan of care reviewed with Tim Richards and family, all concerns addressed, will continue to monitor.    Yamel Stanley

## 2019-10-14 LAB
ALBUMIN SERPL BCP-MCNC: 3.9 G/DL (ref 3.5–5.2)
ALLENS TEST: ABNORMAL
ALP SERPL-CCNC: 90 U/L (ref 55–135)
ALT SERPL W/O P-5'-P-CCNC: 30 U/L (ref 10–44)
ANION GAP SERPL CALC-SCNC: 10 MMOL/L (ref 8–16)
ANION GAP SERPL CALC-SCNC: 10 MMOL/L (ref 8–16)
ANION GAP SERPL CALC-SCNC: 8 MMOL/L (ref 8–16)
ANION GAP SERPL CALC-SCNC: 9 MMOL/L (ref 8–16)
APTT BLDCRRT: 40.5 SEC (ref 21–32)
AST SERPL-CCNC: 36 U/L (ref 10–40)
BASOPHILS # BLD AUTO: 0.02 K/UL (ref 0–0.2)
BASOPHILS NFR BLD: 0.3 % (ref 0–1.9)
BILIRUB DIRECT SERPL-MCNC: 0.4 MG/DL (ref 0.1–0.3)
BILIRUB SERPL-MCNC: 0.5 MG/DL (ref 0.1–1)
BUN SERPL-MCNC: 14 MG/DL (ref 6–20)
BUN SERPL-MCNC: 15 MG/DL (ref 6–20)
BUN SERPL-MCNC: 16 MG/DL (ref 6–20)
BUN SERPL-MCNC: 16 MG/DL (ref 6–20)
CALCIUM SERPL-MCNC: 9.2 MG/DL (ref 8.7–10.5)
CALCIUM SERPL-MCNC: 9.4 MG/DL (ref 8.7–10.5)
CHLORIDE SERPL-SCNC: 97 MMOL/L (ref 95–110)
CHLORIDE SERPL-SCNC: 99 MMOL/L (ref 95–110)
CO2 SERPL-SCNC: 29 MMOL/L (ref 23–29)
CO2 SERPL-SCNC: 30 MMOL/L (ref 23–29)
CO2 SERPL-SCNC: 30 MMOL/L (ref 23–29)
CO2 SERPL-SCNC: 31 MMOL/L (ref 23–29)
CREAT SERPL-MCNC: 1.6 MG/DL (ref 0.5–1.4)
CREAT SERPL-MCNC: 1.7 MG/DL (ref 0.5–1.4)
CRP SERPL-MCNC: 17 MG/L (ref 0–8.2)
DIFFERENTIAL METHOD: ABNORMAL
EOSINOPHIL # BLD AUTO: 0 K/UL (ref 0–0.5)
EOSINOPHIL NFR BLD: 0.7 % (ref 0–8)
ERYTHROCYTE [DISTWIDTH] IN BLOOD BY AUTOMATED COUNT: 15 % (ref 11.5–14.5)
EST. GFR  (AFRICAN AMERICAN): 52.4 ML/MIN/1.73 M^2
EST. GFR  (AFRICAN AMERICAN): 56.4 ML/MIN/1.73 M^2
EST. GFR  (NON AFRICAN AMERICAN): 45.4 ML/MIN/1.73 M^2
EST. GFR  (NON AFRICAN AMERICAN): 48.8 ML/MIN/1.73 M^2
GLUCOSE SERPL-MCNC: 116 MG/DL (ref 70–110)
GLUCOSE SERPL-MCNC: 125 MG/DL (ref 70–110)
GLUCOSE SERPL-MCNC: 78 MG/DL (ref 70–110)
GLUCOSE SERPL-MCNC: 91 MG/DL (ref 70–110)
HCO3 UR-SCNC: 31.6 MMOL/L (ref 24–28)
HCT VFR BLD AUTO: 42.2 % (ref 40–54)
HGB BLD-MCNC: 12.1 G/DL (ref 14–18)
IMM GRANULOCYTES # BLD AUTO: 0.01 K/UL (ref 0–0.04)
IMM GRANULOCYTES NFR BLD AUTO: 0.2 % (ref 0–0.5)
INR PPP: 2.7 (ref 0.8–1.2)
LDH SERPL L TO P-CCNC: 351 U/L (ref 110–260)
LYMPHOCYTES # BLD AUTO: 0.7 K/UL (ref 1–4.8)
LYMPHOCYTES NFR BLD: 10.9 % (ref 18–48)
MAGNESIUM SERPL-MCNC: 1.8 MG/DL (ref 1.6–2.6)
MAGNESIUM SERPL-MCNC: 2 MG/DL (ref 1.6–2.6)
MAGNESIUM SERPL-MCNC: 2 MG/DL (ref 1.6–2.6)
MAGNESIUM SERPL-MCNC: 2.1 MG/DL (ref 1.6–2.6)
MAGNESIUM SERPL-MCNC: 2.4 MG/DL (ref 1.6–2.6)
MCH RBC QN AUTO: 25.7 PG (ref 27–31)
MCHC RBC AUTO-ENTMCNC: 28.7 G/DL (ref 32–36)
MCV RBC AUTO: 90 FL (ref 82–98)
MONOCYTES # BLD AUTO: 0.9 K/UL (ref 0.3–1)
MONOCYTES NFR BLD: 14.7 % (ref 4–15)
NEUTROPHILS # BLD AUTO: 4.4 K/UL (ref 1.8–7.7)
NEUTROPHILS NFR BLD: 73.2 % (ref 38–73)
NRBC BLD-RTO: 0 /100 WBC
PCO2 BLDA: 53.7 MMHG (ref 35–45)
PH SMN: 7.38 [PH] (ref 7.35–7.45)
PHOSPHATE SERPL-MCNC: 2.8 MG/DL (ref 2.7–4.5)
PLATELET # BLD AUTO: 204 K/UL (ref 150–350)
PMV BLD AUTO: 10.7 FL (ref 9.2–12.9)
PO2 BLDA: 45 MMHG (ref 40–60)
POC BE: 6 MMOL/L
POC SATURATED O2: 79 % (ref 95–100)
POC TCO2: 33 MMOL/L (ref 24–29)
POTASSIUM SERPL-SCNC: 3.2 MMOL/L (ref 3.5–5.1)
POTASSIUM SERPL-SCNC: 3.6 MMOL/L (ref 3.5–5.1)
POTASSIUM SERPL-SCNC: 4 MMOL/L (ref 3.5–5.1)
POTASSIUM SERPL-SCNC: 4 MMOL/L (ref 3.5–5.1)
PREALB SERPL-MCNC: 19 MG/DL (ref 20–43)
PROT SERPL-MCNC: 7.5 G/DL (ref 6–8.4)
PROTHROMBIN TIME: 25.8 SEC (ref 9–12.5)
RBC # BLD AUTO: 4.71 M/UL (ref 4.6–6.2)
SAMPLE: ABNORMAL
SITE: ABNORMAL
SODIUM SERPL-SCNC: 136 MMOL/L (ref 136–145)
SODIUM SERPL-SCNC: 138 MMOL/L (ref 136–145)
SODIUM SERPL-SCNC: 138 MMOL/L (ref 136–145)
SODIUM SERPL-SCNC: 139 MMOL/L (ref 136–145)
WBC # BLD AUTO: 6.04 K/UL (ref 3.9–12.7)

## 2019-10-14 PROCEDURE — 99231 SBSQ HOSP IP/OBS SF/LOW 25: CPT | Mod: ,,, | Performed by: INTERNAL MEDICINE

## 2019-10-14 PROCEDURE — 25000003 PHARM REV CODE 250: Performed by: INTERNAL MEDICINE

## 2019-10-14 PROCEDURE — 85730 THROMBOPLASTIN TIME PARTIAL: CPT

## 2019-10-14 PROCEDURE — 99900035 HC TECH TIME PER 15 MIN (STAT)

## 2019-10-14 PROCEDURE — 85025 COMPLETE CBC W/AUTO DIFF WBC: CPT

## 2019-10-14 PROCEDURE — 94761 N-INVAS EAR/PLS OXIMETRY MLT: CPT

## 2019-10-14 PROCEDURE — 99291 PR CRITICAL CARE, E/M 30-74 MINUTES: ICD-10-PCS | Mod: 25,,, | Performed by: INTERNAL MEDICINE

## 2019-10-14 PROCEDURE — 80076 HEPATIC FUNCTION PANEL: CPT

## 2019-10-14 PROCEDURE — 84134 ASSAY OF PREALBUMIN: CPT

## 2019-10-14 PROCEDURE — 84100 ASSAY OF PHOSPHORUS: CPT

## 2019-10-14 PROCEDURE — 99900037 HC PT THERAPY SCREENING (STAT)

## 2019-10-14 PROCEDURE — 83735 ASSAY OF MAGNESIUM: CPT

## 2019-10-14 PROCEDURE — 85610 PROTHROMBIN TIME: CPT

## 2019-10-14 PROCEDURE — 82803 BLOOD GASES ANY COMBINATION: CPT

## 2019-10-14 PROCEDURE — 83615 LACTATE (LD) (LDH) ENZYME: CPT

## 2019-10-14 PROCEDURE — 63600175 PHARM REV CODE 636 W HCPCS: Performed by: INTERNAL MEDICINE

## 2019-10-14 PROCEDURE — 86140 C-REACTIVE PROTEIN: CPT

## 2019-10-14 PROCEDURE — 93750 PR INTERROGATE VENT ASSIST DEV, IN PERSON, W PHYSICIAN ANALYSIS: ICD-10-PCS | Mod: ,,, | Performed by: INTERNAL MEDICINE

## 2019-10-14 PROCEDURE — 99231 PR SUBSEQUENT HOSPITAL CARE,LEVL I: ICD-10-PCS | Mod: ,,, | Performed by: INTERNAL MEDICINE

## 2019-10-14 PROCEDURE — 25000003 PHARM REV CODE 250: Performed by: STUDENT IN AN ORGANIZED HEALTH CARE EDUCATION/TRAINING PROGRAM

## 2019-10-14 PROCEDURE — 27000248 HC VAD-ADDITIONAL DAY

## 2019-10-14 PROCEDURE — 80048 BASIC METABOLIC PNL TOTAL CA: CPT

## 2019-10-14 PROCEDURE — 20000000 HC ICU ROOM

## 2019-10-14 PROCEDURE — 99291 CRITICAL CARE FIRST HOUR: CPT | Mod: 25,,, | Performed by: INTERNAL MEDICINE

## 2019-10-14 PROCEDURE — 63600175 PHARM REV CODE 636 W HCPCS: Performed by: STUDENT IN AN ORGANIZED HEALTH CARE EDUCATION/TRAINING PROGRAM

## 2019-10-14 PROCEDURE — 83735 ASSAY OF MAGNESIUM: CPT | Mod: 91

## 2019-10-14 PROCEDURE — 93750 INTERROGATION VAD IN PERSON: CPT | Mod: ,,, | Performed by: INTERNAL MEDICINE

## 2019-10-14 RX ORDER — POTASSIUM CHLORIDE 20 MEQ/1
40 TABLET, EXTENDED RELEASE ORAL 2 TIMES DAILY
Status: DISCONTINUED | OUTPATIENT
Start: 2019-10-14 | End: 2019-10-15

## 2019-10-14 RX ORDER — POTASSIUM CHLORIDE 20 MEQ/1
60 TABLET, EXTENDED RELEASE ORAL ONCE
Status: COMPLETED | OUTPATIENT
Start: 2019-10-14 | End: 2019-10-14

## 2019-10-14 RX ORDER — MAGNESIUM SULFATE HEPTAHYDRATE 40 MG/ML
2 INJECTION, SOLUTION INTRAVENOUS ONCE
Status: COMPLETED | OUTPATIENT
Start: 2019-10-14 | End: 2019-10-14

## 2019-10-14 RX ORDER — WARFARIN 7.5 MG/1
7.5 TABLET ORAL DAILY
Status: DISCONTINUED | OUTPATIENT
Start: 2019-10-14 | End: 2019-10-16

## 2019-10-14 RX ADMIN — AMIODARONE HYDROCHLORIDE 200 MG: 200 TABLET ORAL at 08:10

## 2019-10-14 RX ADMIN — MESALAMINE 1000 MG: 250 CAPSULE ORAL at 09:10

## 2019-10-14 RX ADMIN — MAGNESIUM OXIDE TAB 400 MG (241.3 MG ELEMENTAL MG) 400 MG: 400 (241.3 MG) TAB at 03:10

## 2019-10-14 RX ADMIN — FUROSEMIDE 40 MG/HR: 10 INJECTION, SOLUTION INTRAMUSCULAR; INTRAVENOUS at 06:10

## 2019-10-14 RX ADMIN — POTASSIUM CHLORIDE 60 MEQ: 20 TABLET, EXTENDED RELEASE ORAL at 06:10

## 2019-10-14 RX ADMIN — FERROUS GLUCONATE TAB 324 MG (37.5 MG ELEMENTAL IRON) 324 MG: 324 (37.5 FE) TAB at 09:10

## 2019-10-14 RX ADMIN — MESALAMINE 1000 MG: 250 CAPSULE ORAL at 05:10

## 2019-10-14 RX ADMIN — MESALAMINE 1000 MG: 250 CAPSULE ORAL at 12:10

## 2019-10-14 RX ADMIN — MAGNESIUM OXIDE TAB 400 MG (241.3 MG ELEMENTAL MG) 400 MG: 400 (241.3 MG) TAB at 09:10

## 2019-10-14 RX ADMIN — MAGNESIUM SULFATE IN WATER 2 G: 40 INJECTION, SOLUTION INTRAVENOUS at 09:10

## 2019-10-14 RX ADMIN — PANTOPRAZOLE SODIUM 40 MG: 40 TABLET, DELAYED RELEASE ORAL at 08:10

## 2019-10-14 RX ADMIN — WARFARIN SODIUM 7.5 MG: 7.5 TABLET ORAL at 05:10

## 2019-10-14 RX ADMIN — POTASSIUM CHLORIDE 40 MEQ: 20 TABLET, EXTENDED RELEASE ORAL at 09:10

## 2019-10-14 RX ADMIN — AMLODIPINE BESYLATE 10 MG: 10 TABLET ORAL at 08:10

## 2019-10-14 RX ADMIN — FUROSEMIDE 40 MG/HR: 10 INJECTION, SOLUTION INTRAMUSCULAR; INTRAVENOUS at 09:10

## 2019-10-14 RX ADMIN — MAGNESIUM OXIDE TAB 400 MG (241.3 MG ELEMENTAL MG) 400 MG: 400 (241.3 MG) TAB at 08:10

## 2019-10-14 RX ADMIN — POTASSIUM CHLORIDE 40 MEQ: 20 TABLET, EXTENDED RELEASE ORAL at 08:10

## 2019-10-14 RX ADMIN — FUROSEMIDE 40 MG/HR: 10 INJECTION, SOLUTION INTRAMUSCULAR; INTRAVENOUS at 12:10

## 2019-10-14 RX ADMIN — MESALAMINE 1000 MG: 250 CAPSULE ORAL at 08:10
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
## 2019-10-14 NOTE — ASSESSMENT & PLAN NOTE
54 yo AAM with s/p HM 2 at 9800, 3/8/18 as DT with repositioning of outflow graft 3/9/18, BiV Medtronic AICD, H/O VT shocks prior to LVAD, alcohol use,  CHF stage D, DCM, GI bleed (7/2019), gout, HTN, CKD ( BL - 1.5 - 1.6),came to ED after AICD firing and ADHF  - LDH of 350  - INR of 2.7. Further Coumadin per Pharmacy.  Home dose 7.5 mg MWF, 5 mg other days  -  ( Bl- 300- 400)    Procedure: Device Interrogation Including analysis of device parameters  Current Settings: Ventricular Assist Device  Review of device function is stable/unstable stable    TXP LVAD INTERROGATIONS 10/14/2019 10/14/2019 10/14/2019 10/14/2019 10/14/2019 10/14/2019 10/14/2019   Type HeartMate II HeartMate II HeartMate II HeartMate II HeartMate II HeartMate II HeartMate II   Flow 6.3 6.0 6.2 6.4 6.0 5.9 6.4   Speed 9800 9800 9800 9800 9800 9800 9800   PI 3.7 3.6 3.6 3.5 3.7 3.8 3.3   Power (Jackson) 6.9 6.6 6.8 6.9 6.7 6.7 7   LSL 9400 9400 9400 9400 9400 9400 9400   Pulsatility Intermittent pulse Intermittent pulse Intermittent pulse Intermittent pulse Intermittent pulse Intermittent pulse Intermittent pulse

## 2019-10-14 NOTE — PROGRESS NOTES
Admit Note     Met with patient and spouse to assess needs. Patient is a 53 y.o.  male, admitted for Ventricular fibrillation [I49.01] Shortness of breath [R06.02], VT (ventricular tachycardia) [I47.2], DCM (dilated cardiomyopathy) [I42.0], CKD (chronic kidney disease), stage III [N18.3], Defibrillator discharge [Z45.02], Acute on chronic combined systolic and diastolic congestive heart failure [I50.43], Essential hypertension [I10], Heart failure [I50.9], LVAD (left ventricular assist device) present [Z95.811], Severe obesity (BMI 35.0-39.9) with comorbidity [E66.01]. Hypertensive cardiovascular-renal disease, stage 1-4 or unspecified chronic kidney disease, with heart failure [I13.0] per medical record.    Pt received an LVAD on 3/8/18. Pt does his own dressing changes. Pt's wife is primary caregiver.    Patient admitted on 10/12/2019.  At this time, patient presents as alert and oriented x 4, good eye contact, calm and communicative.  At this time, patients caregiver presents as aaox4 with open affect, asking and answering questions appropriately.    Household/Family Systems     Patient resides with patient's wife, step son (age 28) at     [address] [place] [address] [address]  [place] [address].      Pt's cell:  [phone]  Pt's work: [phone]  Kelly Richards (spouse) [phone]    Support system includes spouse and adult step son. Patient does not have dependents that are need of being cared for.     During admission, patient's caregiver plans to visit and be here most of the time. Confirmed patient and patients caregivers do have access to reliable transportation.    Cognitive Status/Learning     Patient reports reading ability as college and states patient does not have difficulty with reading, writing, seeing, hearing, comprehension, learning and memory. Pt wears glasses.   Patient reports patient learns best by reading.   Needed: No.   Highest education level: Attended College/Technical  School    Vocation/Disability   .  Working for Income: yes  If yes, working activity level: Working Full Time  Patient reports he works in the [employer] department at a [employer].     Adherence     Patient reports a medium level of adherence to patients health care regimen.  Adherence counseling and education provided. Patient verbalizes understanding.    Substance Use    Patient reports the following substance usage.    Tobacco: Pt started smoking at age 17. Pt used to smoke 1/2 ppd. Pt quit in 2018. Pt reports started smoking again after LVAD and use was 1.5-2pks/week. Pt reports last time smoking was 2019. Pt states in motivated to remain tobacco free in order to be listed for transplant.   Alcohol: none.  Pt reports no alcohol since 2018.   Illicit Drugs/Non-prescribed Medications: none, patient denies any use.  Patient states clear understanding of the potential impact of substance use.  Substance abstinence/cessation counseling, education and resources provided and reviewed.     Services Utilizing/ADLS    Infusion Service: Prior to admission, patient utilizing? no  Home Health: Prior to admission, patient utilizing? no  DME: Prior to admission, yes cane  Pulmonary/Cardiac Rehab: Prior to admission, no  Dialysis:  Prior to admission, no  Transplant Specialty Pharmacy:  Prior to admission, no.    Prior to admission, patient reports patient was independent with ADLS and was driving. Pt's spouse also drives.  Patient reports patient is not able to care for self at this time due to compromised medical condition (as documented in medical record) and physical weakness..  Patient indicates a willingness to care for self once medically cleared to do so.    Insurance/Medications    Insured by     Payor/Plan Subscr  Sex Relation Sub. Ins. ID Effective Group Num   1. AETNA - AETNA* JESUS PHELPS* 1965 Male  [id number]                                    [address]   2. LARYSLISET - SMO* JESUS PHELPS 1965 Male  [id number] 11/14/16                                    [address]      Primary Insurance (for UNOS reporting): Private Insurance  Secondary Insurance (for UNOS reporting): None (pt denies he has Monserrat)    Patient reports patient is able to obtain and afford medications at this time and at time of discharge, though he reports costs are going up.    Living Will/Healthcare Power of     Patient states patient does not have a LW and/or HCPA.   provided education regarding LW and HCPA and the completion of forms.    Coping/Mental Health    Patient is coping well with the aid of  family members.  Patient denies mental health difficulties. General support provided.     Discharge Planning    At time of discharge, patient plans to return to patient's home under the care of self and spouse.  Patients spouse will transport patient.  Per rounds today, expected discharge date has not been medically determined at this time. Patient and patients caregiver verbalize understanding and are involved in treatment planning and discharge process.    Additional Concerns    Patient is being followed for needs, education, resources, information, emotional support, supportive counseling, and for supportive and skilled discharge plan of care.  providing ongoing psychosocial support, education, resources and d/c planning as needed.  SW remains available. Patient denies additional needs and/or concerns at this time. Patient verbalizes understanding and agreement with information reviewed, social work availability, and how to access available resources as needed.

## 2019-10-14 NOTE — PROGRESS NOTES
[facility]  Cardiac Electrophysiology  Progress Note    Admission Date: 10/12/2019  Code Status: Full Code   Attending Physician: Antonio Hadley Jr.,*   Expected Discharge Date:   Principal Problem:<principal problem not specified>    Subjective:     Interval History: No acute events overnight. Vitals within normal. No acute events on tele    Review of Systems   Constitution: Negative for chills and fever.   Cardiovascular: Positive for dyspnea on exertion. Negative for chest pain, irregular heartbeat, near-syncope and syncope.   Respiratory: Negative for cough and shortness of breath.    Gastrointestinal: Negative for nausea.   Neurological: Negative for headaches and weakness.   Psychiatric/Behavioral: Negative for altered mental status.     Objective:     Vital Signs (Most Recent):  Temp: 98.5 °F (36.9 °C) (10/14/19 1100)  Pulse: 69 (10/14/19 1100)  Resp: 18 (10/14/19 1100)  BP: (!) 85/0 (10/14/19 1101)  SpO2: 99 % (10/14/19 0800) Vital Signs (24h Range):  Temp:  [98.4 °F (36.9 °C)-99 °F (37.2 °C)] 98.5 °F (36.9 °C)  Pulse:  [64-90] 69  Resp:  [18-30] 18  SpO2:  [89 %-100 %] 99 %  BP: ()/(0-76) 85/0     Weight: 122.3 kg (269 lb 11.7 oz)  Body mass index is 35.59 kg/m².     SpO2: 99 %  O2 Device (Oxygen Therapy): room air    Physical Exam   Constitutional: He is oriented to person, place, and time. He appears well-developed and well-nourished.   HENT:   Head: Normocephalic and atraumatic.   Cardiovascular: Normal rate and regular rhythm.   Pulmonary/Chest: Effort normal and breath sounds normal. No respiratory distress.   Abdominal: Soft. Bowel sounds are normal. He exhibits no distension.   Musculoskeletal: He exhibits no edema.   Neurological: He is alert and oriented to person, place, and time.   Skin: Skin is warm and dry.   Psychiatric: He has a normal mood and affect. His behavior is normal. Judgment and thought content normal.   Vitals reviewed.    Significant Labs:   CMP:  "  Recent Labs   Lab 10/12/19  1254  10/13/19  2106 10/14/19  0226 10/14/19  0837      < > 139 138 139   K 4.0   < > 3.4* 3.6 4.0   CL 99   < > 100 99 99   CO2 24   < > 28 29 30*   *   < > 105 78 125*   BUN 21*   < > 18 16 14   CREATININE 2.5*   < > 1.9* 1.6* 1.7*   CALCIUM 9.7   < > 9.0 9.2 9.2   PROT 7.9  --   --  7.5  --    ALBUMIN 4.2  --   --  3.9  --    BILITOT 0.5  --   --  0.5  --    ALKPHOS 92  --   --  90  --    AST 26  --   --  36  --    ALT 21  --   --  30  --    ANIONGAP 13   < > 11 10 10   ESTGFRAFRICA 32.9*   < > 45.8* 56.4* 52.4*   EGFRNONAA 28.5*   < > 39.7* 48.8* 45.4*    < > = values in this interval not displayed.    and CBC:   Recent Labs   Lab 10/12/19  1539 10/13/19  0121 10/14/19  0226   WBC 8.15 7.31 6.04   HGB 12.4* 11.3* 12.1*   HCT 40.1 38.4* 42.2    191 204       Significant Imaging:  n/a    Assessment and Plan:     VT (ventricular tachycardia)  Tim Richards is a 53 y.o.M with chronic HFrEF (EF 10%) with BiV-ICD implanted in 2014 by Dr. Chiu ([facility] and h/o ICD treated VT in 2018 and LVAD implantation who presented with VT/VF with unsuccessful ICD therapies. Prior to this last event was 10/2018 received DCCV x1. Patient has been on Amiodarone for 1 year 200mg qday,   He reports he felt flushed and lightheaded right before his ICD started to fire x5. EP consulted for VF/VT dilan s/p ICD shock.      - Medtronic ICD interrogated, Mode DDD, underlying rhythm VF, not able to interrogate most recent therapy as "episode still recording". Elevated Optivol Fluid index and thoracic impedance. ICD re-interrogated, event initiated with MMVT () on 10/12/2019 for total of 11:52 ATP x 5 / DCCV x 6 S/p unsuccessful ATP x3 followed by unsuccessful DCCV x 1followed unsuccessful ATP burst x2 which degenerated into VF s/p unsuccessful DCCV x 5.   - in the ER, he received IV amiodarone 150mg x2, Lidocaine 100mg, Magnesium 2mg, Versed 2mg x2 and Fentanyl 50mg x2 s/p " unsynchronized DCCV 120J for VT/VF in the setting of ADHF, which successfully converted him into AS- rhythm.    - on 10/13/19: BiV pacing was turned off  - trigger likely ADHF, volume overload, amiodarone causing higher defibrillatory tissue threshold, or change in heart-shock vector post LVAD implant    Recommendations:  - continue PO amiodarone 200mg daily   - Low threshold for Lidocaine ggt if further episodes of VT/VT  - continue diuresis per HTS. Once euvolemic and has chance for RV recovery, will consider DFT to optimize shock therapy prior to discharge.   - consider 2D ECHO with contrast once euvolemic to r/o apical thrombus  - Monitor electrolytes Mag >2, K >4  - Keep  and defib pads on board     Thank you for the consult. We will continue to follow. Please don't hesitate to call with questions.     Patient seen and plan of care discussed with Dr. Sumeet Vasquez MD  Cardiac Electrophysiology  [facility]

## 2019-10-14 NOTE — NURSING
CMICU DAILY GOALS       A: Awake    RASS: Goal - RASS Goal: 0-->alert and calm  Actual - RASS (Newman Agitation-Sedation Scale): 0-->alert and calm   Restraint necessity:    B: Breath   SBT: Not intubated   C: Coordinate A & B, analgesics/sedatives   Pain: managed    SAT: Not intubated  D: Delirium   CAM-ICU: Overall CAM-ICU: Negative  E: Early Mobility   MOVE Screen: Pass   Activity: Activity Management: activity adjusted per tolerance  FAS: Feeding/Nutrition   Diet order: Diet/Nutrition Received: low saturated fat/low cholesterol, 2 gram sodium,   Fluid restriction:    T: Thrombus   DVT prophylaxis: VTE Required Core Measure: Patient refused interventions  H: HOB Elevation   Head of Bed (HOB): HOB at 30-45 degrees  U: Ulcer Prophylaxis   GI: yes  G: Glucose control   not a diabetic    S: Skin   Bundle compliance: yes   Bathing/Skin Care: bath, chlorhexidine, bath, complete, linen changed Date: pt is a pm bath  B: Bowel Function   no issues   I: Indwelling Catheters   [place] necessity:  n/a   CVC necessity: N/a   IPAD offered: No  D: De-escalation Antibx   N/a  Plan for the day  Pt was spoken to by EP and DFT procedure explained and consent signed by pt. Pacer component of device was turned off today by EP per order from Dr Fay.  Pt voiced understnding.  Pt's wife was on speaker when Dr Fay explained plan for DFT  and pacer component being turned off.  No LVAD alarms.    Family/Goals of care/Code Status   Code Status: Full Code     No acute events throughout day, VS and assessment per flow sheet, patient progressing towards goals as tolerated, plan of care reviewed with Tim Richards and wife, all concerns addressed, will continue to monitor.    Irasema Bonner

## 2019-10-14 NOTE — PROGRESS NOTES
10/14/2019  Fitz Christianson    Current provider:  Antonio Hadley Jr.,*      I, Fitz Christianson, rounded on Tim Richards to ensure all mechanical assist device settings (IABP or VAD) were appropriate and all parameters were within limits.  I was able to ensure all back up equipment was present, the staff had no issues, and the Perfusion Department daily rounding was complete.    9:08 AM

## 2019-10-14 NOTE — ASSESSMENT & PLAN NOTE
- Pt with Volume overload on exam, diuresing with lasix at 40 mg/hr  - UO 5 lt since last night with negative 1.7 lt today.  TTE: LV 10 cm ( changed from prior TTE 7 cm), Moderately reduced right ventricular systolic function. Grade II (moderate) left ventricular diastolic dysfunction consistent with pseudonormalization.  Severe biatrial enlargement. Mild-to-moderate mitral regurgitation. Mild tricuspid regurgitation. The estimated PA systolic pressure is 43 mm Hg. Intermediate central venous pressure (8 mm Hg).

## 2019-10-14 NOTE — PLAN OF CARE
CMICU DAILY GOALS       A: Awake    RASS: Goal - RASS Goal: 0-->alert and calm  Actual - RASS (Newman Agitation-Sedation Scale): 0-->alert and calm   Restraint necessity:    B: Breath   SBT: NA   C: Coordinate A & B, analgesics/sedatives   Pain: managed    SAT: NA  D: Delirium   CAM-ICU: Overall CAM-ICU: Negative  E: Early Mobility   MOVE Screen: Pass   Activity: Activity Management: activity adjusted per tolerance  FAS: Feeding/Nutrition   Diet order: Diet/Nutrition Received: 2 gram sodium, low saturated fat/low cholesterol, restrict fluids,   Fluid restriction: Fluid Requirement: 1500 FR  T: Thrombus   DVT prophylaxis: VTE Required Core Measure: Patient refused interventions  H: HOB Elevation   Head of Bed (HOB): HOB at 20-30 degrees  U: Ulcer Prophylaxis   GI: yes  G: Glucose control   managed    S: Skin   Bundle compliance: yes   Bathing/Skin Care: bath, chlorhexidine, bath, complete, linen changed Date: 10/14/2019    B: Bowel Function   no issues   I: Indwelling Catheters   [place] necessity:     CVC necessity: Yes   IPAD offered: No  D: De-escalation Antibx   Yes    Family/Goals of care/Code Status   Code Status: Full Code     No acute events throughout day, VS and assessment per flow sheet, patient progressing towards goals as tolerated.  Pt AAO x4. Afebrile. Pt jorgito to ambulate with minimal assistance. VAD present, Doppler BP 84, 90, 85, 88; Fl;ows 5.8 - 6.6. Speeds 9800; No alarms on shift;  HR 60-70s. Sinus arrhthymias with frequent PVCs. Pt remains on room air. Sat >99% intermittent Q4 hours. Pt remains on cardiac diet with 1500 FR. Pt voided ~4L during the shift. No BM this shift.  Skin intact. VAD dressing change 10/14/2019 @ 1600 by wife. Lasix drip.   Plan of care reviewed with Tim Richards and family, all concerns addressed, will continue to monitor.    Nathalia Vidal

## 2019-10-14 NOTE — ASSESSMENT & PLAN NOTE
"Tim Richards is a 53 y.o.M with chronic HFrEF (EF 10%) with BiV-ICD implanted in 2014 by Dr. Chiu ([facility] and h/o ICD treated VT in 2018 and LVAD implantation who presented with VT/VF with unsuccessful ICD therapies. Prior to this last event was 10/2018 received DCCV x1. Patient has been on Amiodarone for 1 year 200mg qday,   He reports he felt flushed and lightheaded right before his ICD started to fire x5. EP consulted for VF/VT dilan s/p ICD shock.      - Medtronic ICD interrogated, Mode DDD, underlying rhythm VF, not able to interrogate most recent therapy as "episode still recording". Elevated Optivol Fluid index and thoracic impedance. ICD re-interrogated, event initiated with MMVT () on 10/12/2019 for total of 11:52 ATP x 5 / DCCV x 6 S/p unsuccessful ATP x3 followed by unsuccessful DCCV x 1followed unsuccessful ATP burst x2 which degenerated into VF s/p unsuccessful DCCV x 5.   - in the ER, he received IV amiodarone 150mg x2, Lidocaine 100mg, Magnesium 2mg, Versed 2mg x2 and Fentanyl 50mg x2 s/p unsynchronized DCCV 120J for VT/VF in the setting of ADHF, which successfully converted him into AS- rhythm.   - Amiodarone gtt -> switched back to PO 200mg daily   - Low threshold for Lidocaine ggt if further episodes of VT/VT  - trigger likely ADHF, volume overload, amiodarone causing higher defibrillatory tissue threshold, or change in heart-shock vector post LVAD implant  - continue diuresis per HTS. Once euvolemic and has chance for RV recovery, will consider DFT to optimize shock therapy prior to discharge.   - Monitor electrolytes Mag >2, K >4  - Keep  and defib pads on board     "

## 2019-10-14 NOTE — PT/OT/SLP PROGRESS
"Physical Therapy Screen/Discharge      Patient Name:  Tim Richards   MRN:  [id number]    History taken:  Pt lives with wife in a 2SH with a flight of stairs to 2nd floor with L HR. Pt indep with ambulation and ADLs PTA.     When prompting pt to ambulate. Pt refused saying "I had heart palpitations yesterday, and I need to rest today." PT asked if pt had any concerns about mobility or LVAD management. Pt reported no concerns. PT educated pt to re consult PT if change in mobility occurs. Pt verbalized understanding     Jenn Mejia, PT, DPT  10/14/2019  [phone]    "
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
## 2019-10-14 NOTE — PROGRESS NOTES
Pt and wife AAAO.  No alarms noted in VAD history.  Of note, PI was 1.2-1.6 10/12/19 from 1022 am to 1343.  PI currently 3.3.  No questions for me at this time.

## 2019-10-14 NOTE — SUBJECTIVE & OBJECTIVE
Interval History: No acute events overnight. Vitals within normal. No acute events on tele    Review of Systems   Constitution: Negative for chills and fever.   Cardiovascular: Positive for dyspnea on exertion. Negative for chest pain, irregular heartbeat, near-syncope and syncope.   Respiratory: Negative for cough and shortness of breath.    Gastrointestinal: Negative for nausea.   Neurological: Negative for headaches and weakness.   Psychiatric/Behavioral: Negative for altered mental status.     Objective:     Vital Signs (Most Recent):  Temp: 98.5 °F (36.9 °C) (10/14/19 1100)  Pulse: 69 (10/14/19 1100)  Resp: 18 (10/14/19 1100)  BP: (!) 85/0 (10/14/19 1101)  SpO2: 99 % (10/14/19 0800) Vital Signs (24h Range):  Temp:  [98.4 °F (36.9 °C)-99 °F (37.2 °C)] 98.5 °F (36.9 °C)  Pulse:  [64-90] 69  Resp:  [18-30] 18  SpO2:  [89 %-100 %] 99 %  BP: ()/(0-76) 85/0     Weight: 122.3 kg (269 lb 11.7 oz)  Body mass index is 35.59 kg/m².     SpO2: 99 %  O2 Device (Oxygen Therapy): room air    Physical Exam   Constitutional: He is oriented to person, place, and time. He appears well-developed and well-nourished.   HENT:   Head: Normocephalic and atraumatic.   Cardiovascular: Normal rate and regular rhythm.   Pulmonary/Chest: Effort normal and breath sounds normal. No respiratory distress.   Abdominal: Soft. Bowel sounds are normal. He exhibits no distension.   Musculoskeletal: He exhibits no edema.   Neurological: He is alert and oriented to person, place, and time.   Skin: Skin is warm and dry.   Psychiatric: He has a normal mood and affect. His behavior is normal. Judgment and thought content normal.   Vitals reviewed.    Significant Labs:   CMP:   Recent Labs   Lab 10/12/19  1254  10/13/19  2106 10/14/19  0226 10/14/19  0837      < > 139 138 139   K 4.0   < > 3.4* 3.6 4.0   CL 99   < > 100 99 99   CO2 24   < > 28 29 30*   *   < > 105 78 125*   BUN 21*   < > 18 16 14   CREATININE 2.5*   < > 1.9* 1.6* 1.7*    CALCIUM 9.7   < > 9.0 9.2 9.2   PROT 7.9  --   --  7.5  --    ALBUMIN 4.2  --   --  3.9  --    BILITOT 0.5  --   --  0.5  --    ALKPHOS 92  --   --  90  --    AST 26  --   --  36  --    ALT 21  --   --  30  --    ANIONGAP 13   < > 11 10 10   ESTGFRAFRICA 32.9*   < > 45.8* 56.4* 52.4*   EGFRNONAA 28.5*   < > 39.7* 48.8* 45.4*    < > = values in this interval not displayed.    and CBC:   Recent Labs   Lab 10/12/19  1539 10/13/19  0121 10/14/19  0226   WBC 8.15 7.31 6.04   HGB 12.4* 11.3* 12.1*   HCT 40.1 38.4* 42.2    191 204       Significant Imaging:  n/a

## 2019-10-14 NOTE — ASSESSMENT & PLAN NOTE
Admitted for AICD firing for VT->VF with unsuccessful treatment x5. Required defibrillation in ER.   AICD interrogation: 10/12/2019: time 11:52: ATP x 5 / DCCV x 6. MMVT (-290)  S/p unsuccessful ATP x3 , followed by unsuccessful DCCV x 1, followed unsuccessful ATP burst x2 which degenerated into VF s/p unsuccessful DCCV x 5  In ER, underlying rhythm noted to be VF, defib pads placed, loaded Amiodarone 150mg x2, Lidocaine 100mg, Magnesium 2mg, Versed 4mg / Fentanyl 100mcg. Given Unsynchronized 120V DCCV, which was successful, current rhythm AS- 70.   - EP consulted and will plan for DFT as inpatient . Amio gtt held and will continue lower dose amiodarone   - TTE with LVDD 10 cm which may have contributed to his VT in setting of ADHF  - Will follow with DFT and continue with diuresis and repeat TTE by end of week to assess need for further LV unloading with speed change

## 2019-10-14 NOTE — SUBJECTIVE & OBJECTIVE
Interval History: No further issues overnight. This AM he felt palpitations but no chest pain or shortness of breath. No events on telemetry except for a single PVC.   - EP consulted with plan for DFT as inpatient. Amio gtt held and is on low dose PO Aamiodarone   - TTE with LVDD 10 cm which may have contributed to VT in setting of ADHF  - Will follow with DFT and continue with diuresis with repeat TTE by end of week to assess need for further LV unloading with or without speed change on VAD  - Encouraged to quit smoking if OHTx is considered an option as outpatient    Continuous Infusions:   furosemide (LASIX) 2 mg/mL infusion (non-titrating) 40 mg/hr (10/14/19 1100)     Scheduled Meds:   amiodarone  200 mg Oral Daily    amLODIPine  10 mg Oral Daily    ferrous gluconate  324 mg Oral Daily with breakfast    magnesium oxide  400 mg Oral TID    magnesium sulfate IVPB  2 g Intravenous Once    mesalamine  1,000 mg Oral QID    pantoprazole  40 mg Oral Daily    potassium chloride  40 mEq Oral BID     PRN Meds:influenza, sodium chloride 0.9%    Review of patient's allergies indicates:   Allergen Reactions    Lisinopril Anaphylaxis     Objective:     Vital Signs (Most Recent):  Temp: 98.5 °F (36.9 °C) (10/14/19 1100)  Pulse: 69 (10/14/19 1100)  Resp: 18 (10/14/19 1100)  BP: 99/69 (10/14/19 1100)  SpO2: 99 % (10/14/19 0800) Vital Signs (24h Range):  Temp:  [98.4 °F (36.9 °C)-99 °F (37.2 °C)] 98.5 °F (36.9 °C)  Pulse:  [64-90] 69  Resp:  [18-30] 18  SpO2:  [89 %-100 %] 99 %  BP: ()/(0-76) 99/69     Patient Vitals for the past 72 hrs (Last 3 readings):   Weight   10/14/19 0400 122.3 kg (269 lb 11.7 oz)   10/13/19 0100 127.6 kg (281 lb 4.9 oz)   10/13/19 0059 127.6 kg (281 lb 4.9 oz)     Body mass index is 35.59 kg/m².      Intake/Output Summary (Last 24 hours) at 10/14/2019 1130  Last data filed at 10/14/2019 1100  Gross per 24 hour   Intake 2100.01 ml   Output 7920 ml   Net -5819.99 ml       Hemodynamic  Parameters:       Telemetry: No event     Physical Exam   Constitutional: He is oriented to person, place, and time. He appears well-developed.   Neck: JVD (16 cm) present.   Cardiovascular: Normal rate.   Murmur (VAD) heard.  Pulmonary/Chest: Effort normal and breath sounds normal.   Abdominal: Soft. Bowel sounds are normal.   Musculoskeletal: Normal range of motion. He exhibits no edema.   Neurological: He is alert and oriented to person, place, and time. No cranial nerve deficit.   Skin: Skin is warm and dry.     Significant Labs:  CBC:  Recent Labs   Lab 10/12/19  1539 10/13/19  0121 10/14/19  0226   WBC 8.15 7.31 6.04   RBC 4.54* 4.30* 4.71   HGB 12.4* 11.3* 12.1*   HCT 40.1 38.4* 42.2    191 204   MCV 88 89 90   MCH 27.3 26.3* 25.7*   MCHC 30.9* 29.4* 28.7*     BNP:  Recent Labs   Lab 10/10/19  1146 10/12/19  1254   * 809*     CMP:  Recent Labs   Lab 10/10/19  1146 10/12/19  1254  10/13/19  2106 10/14/19  0226 10/14/19  0837   GLU 96 142*   < > 105 78 125*   CALCIUM 9.1 9.7   < > 9.0 9.2 9.2   ALBUMIN 4.6 4.2  --   --  3.9  --    PROT 8.4 7.9  --   --  7.5  --    * 136   < > 139 138 139   K 3.6 4.0   < > 3.4* 3.6 4.0   CO2 28 24   < > 28 29 30*   CL 98* 99   < > 100 99 99   BUN 22* 21*   < > 18 16 14   CREATININE 2.1* 2.5*   < > 1.9* 1.6* 1.7*   ALKPHOS 80 92  --   --  90  --    ALT 23 21  --   --  30  --    AST 27 26  --   --  36  --    BILITOT 0.6 0.5  --   --  0.5  --     < > = values in this interval not displayed.      Coagulation:   Recent Labs   Lab 10/12/19  1539 10/13/19  0121 10/14/19  0226   INR 3.8* 4.3* 2.7*   APTT 42.8* 47.8* 40.5*     LDH:  Recent Labs   Lab 10/12/19  1325 10/14/19  0226   * 351*     Microbiology:  Microbiology Results (last 7 days)     ** No results found for the last 168 hours. **          I have reviewed all pertinent labs within the past 24 hours.    Estimated Creatinine Clearance: 69.7 mL/min (A) (based on SCr of 1.7 mg/dL (H)).    Diagnostic  Results:  I have reviewed all pertinent imaging results/findings within the past 24 hours.

## 2019-10-14 NOTE — SUBJECTIVE & OBJECTIVE
Past Medical History:   Diagnosis Date    CHF (congestive heart failure)     Dilated cardiomyopathy 1/10/2018    Disorder of kidney and ureter     CKD    Gout     HTN (hypertension)     Ventricular tachycardia (paroxysmal)        Past Surgical History:   Procedure Laterality Date    CARDIAC CATHETERIZATION  Dec. 2012    CARDIAC DEFIBRILLATOR PLACEMENT Left     CRRT-D    COLONOSCOPY N/A 3/6/2018    Procedure: COLONOSCOPY;  Surgeon: Alonso Bone MD;  Location: [facility] ENDO (2ND FLR);  Service: Endoscopy;  Laterality: N/A;    COLONOSCOPY N/A 7/17/2019    Procedure: COLONOSCOPY;  Surgeon: Blane Valdez MD;  Location: [facility] ENDO (2ND FLR);  Service: Endoscopy;  Laterality: N/A;    COLONOSCOPY N/A 7/18/2019    Procedure: COLONOSCOPY;  Surgeon: Blane Valdez MD;  Location: [facility] ENDO (2ND FLR);  Service: Endoscopy;  Laterality: N/A;    ESOPHAGOGASTRODUODENOSCOPY N/A 7/17/2019    Procedure: EGD (ESOPHAGOGASTRODUODENOSCOPY);  Surgeon: Blane Valdez MD;  Location: [facility] ENDO (2ND FLR);  Service: Endoscopy;  Laterality: N/A;    ESOPHAGOGASTRODUODENOSCOPY N/A 7/18/2019    Procedure: EGD (ESOPHAGOGASTRODUODENOSCOPY);  Surgeon: Blane Valdez MD;  Location: [facility] (2ND FLR);  Service: Endoscopy;  Laterality: N/A;       Review of patient's allergies indicates:   Allergen Reactions    Lisinopril Anaphylaxis       Current Facility-Administered Medications   Medication    amiodarone tablet 200 mg    amLODIPine tablet 10 mg    ferrous gluconate tablet 324 mg    furosemide (LASIX) 2 mg/mL in sodium chloride 0.9% 100 mL infusion (conc: 2 mg/mL)    influenza (QUADRIVALENT PF) vaccine 0.5 mL    magnesium oxide tablet 400 mg    magnesium sulfate 2g in water 50mL IVPB (premix)    mesalamine CR capsule 1,000 mg    pantoprazole EC tablet 40 mg    potassium chloride SA CR tablet 40 mEq    sodium chloride 0.9% flush 10 mL     Family History     Problem Relation (Age of Onset)    Cancer Sister (54)    Coronary artery disease Father     Diabetes Father    Heart disease Father    Hypertension Father    No Known Problems Mother, Brother        Tobacco Use    Smoking status: Former Smoker     Packs/day: 1.00     Years: 31.00     Pack years: 31.00     Types: Cigarettes     Last attempt to quit: 2018     Years since quittin.7    Smokeless tobacco: Never Used    Tobacco comment: pt is quiting on his own - pt stated not qualified for program;  pt  quit on his own   Substance and Sexual Activity    Alcohol use: No     Alcohol/week: 0.0 standard drinks     Comment: one fifth of gin or rum/week    Drug use: No    Sexual activity: Yes     Partners: Female     Birth control/protection: None     Comment: 10/5/17  with same partner 7 years      Review of Systems   Constitutional: Positive for activity change, fatigue and unexpected weight change. Negative for fever.   HENT: Negative for voice change.    Eyes: Negative for visual disturbance.   Respiratory: Positive for shortness of breath.    Cardiovascular: Positive for palpitations and leg swelling. Negative for chest pain.   Gastrointestinal: Positive for blood in stool. Negative for abdominal distention, abdominal pain, nausea and vomiting.   Endocrine: Negative for polyuria.   Genitourinary: Positive for decreased urine volume.   Neurological: Positive for dizziness and light-headedness. Negative for syncope.   Psychiatric/Behavioral: Negative for agitation.     Objective:     Vital Signs (Most Recent):  Temp: 98.5 °F (36.9 °C) (10/14/19 1100)  Pulse: 69 (10/14/19 1100)  Resp: 18 (10/14/19 1100)  BP: 99/69 (10/14/19 1100)  SpO2: 99 % (10/14/19 0800) Vital Signs (24h Range):  Temp:  [98.4 °F (36.9 °C)-99 °F (37.2 °C)] 98.5 °F (36.9 °C)  Pulse:  [64-90] 69  Resp:  [18-30] 18  SpO2:  [89 %-100 %] 99 %  BP: ()/(0-76) 99/69     Patient Vitals for the past 72 hrs (Last 3 readings):   Weight   10/14/19 0400 122.3 kg (269 lb 11.7 oz)   10/13/19 0100 127.6 kg (281 lb 4.9 oz)    10/13/19 0059 127.6 kg (281 lb 4.9 oz)     Body mass index is 35.59 kg/m².      Intake/Output Summary (Last 24 hours) at 10/14/2019 1125  Last data filed at 10/14/2019 1100  Gross per 24 hour   Intake 2100.01 ml   Output 7920 ml   Net -5819.99 ml       Physical Exam   Constitutional: He is oriented to person, place, and time. He appears well-developed.   HENT:   Mouth/Throat: Oropharynx is clear and moist.   Neck: JVD (Mid neck) present.   Cardiovascular: Normal rate, regular rhythm and intact distal pulses.   Smooth VAD hum   Pulmonary/Chest: Effort normal and breath sounds normal. No respiratory distress. He has no rales.   Abdominal: Soft. Bowel sounds are normal. He exhibits no distension. There is no tenderness. There is no guarding.   Musculoskeletal: He exhibits no edema (3+).   Neurological: He is alert and oriented to person, place, and time.   Skin: Skin is warm.   Psychiatric: He has a normal mood and affect.   Nursing note and vitals reviewed.      Significant Labs:  CBC:  Recent Labs   Lab 10/12/19  1539 10/13/19  0121 10/14/19  0226   WBC 8.15 7.31 6.04   RBC 4.54* 4.30* 4.71   HGB 12.4* 11.3* 12.1*   HCT 40.1 38.4* 42.2    191 204   MCV 88 89 90   MCH 27.3 26.3* 25.7*   MCHC 30.9* 29.4* 28.7*     BNP:  Recent Labs   Lab 10/10/19  1146 10/12/19  1254   * 809*     CMP:  Recent Labs   Lab 10/10/19  1146 10/12/19  1254  10/13/19  2106 10/14/19  0226 10/14/19  0837   GLU 96 142*   < > 105 78 125*   CALCIUM 9.1 9.7   < > 9.0 9.2 9.2   ALBUMIN 4.6 4.2  --   --  3.9  --    PROT 8.4 7.9  --   --  7.5  --    * 136   < > 139 138 139   K 3.6 4.0   < > 3.4* 3.6 4.0   CO2 28 24   < > 28 29 30*   CL 98* 99   < > 100 99 99   BUN 22* 21*   < > 18 16 14   CREATININE 2.1* 2.5*   < > 1.9* 1.6* 1.7*   ALKPHOS 80 92  --   --  90  --    ALT 23 21  --   --  30  --    AST 27 26  --   --  36  --    BILITOT 0.6 0.5  --   --  0.5  --     < > = values in this interval not displayed.      Coagulation:    Recent Labs   Lab 10/12/19  1539 10/13/19  0121 10/14/19  0226   INR 3.8* 4.3* 2.7*   APTT 42.8* 47.8* 40.5*     LDH:  Recent Labs   Lab 10/12/19  1325 10/14/19  0226   * 351*     Microbiology:  Microbiology Results (last 7 days)     ** No results found for the last 168 hours. **          BMP:   Recent Labs   Lab 10/14/19  0837   *      K 4.0   CL 99   CO2 30*   BUN 14   CREATININE 1.7*   CALCIUM 9.2   MG 1.8     Cardiac Markers: No results for input(s): CKMB, TROPONINT, MYOGLOBIN in the last 72 hours.  Coagulation:   Recent Labs   Lab 10/14/19  0226   INR 2.7*   APTT 40.5*     I have reviewed all pertinent labs within the past 24 hours.    Diagnostic Results:  I have reviewed all pertinent imaging results/findings within the past 24 hours.

## 2019-10-14 NOTE — ASSESSMENT & PLAN NOTE
- Volume overload on exam and will continue diuresing with lasix at 40 mg/hr  - UO 7 lt since last night with negative 5 lt today.  - TTE: LV 10 cm ( changed from prior TTE 7 cm), Moderately reduced right ventricular systolic function. Grade II (moderate) left ventricular diastolic dysfunction consistent with pseudonormalization. Severe biatrial enlargement. Mild-to-moderate mitral regurgitation. Mild tricuspid regurgitation. The estimated PA systolic pressure is 43 mm Hg. Intermediate central venous pressure (8 mm Hg)  - Encouraged to quit smoking if considering OHTx as future options

## 2019-10-15 LAB
ALBUMIN SERPL BCP-MCNC: 3.7 G/DL (ref 3.5–5.2)
ALP SERPL-CCNC: 96 U/L (ref 55–135)
ALT SERPL W/O P-5'-P-CCNC: 26 U/L (ref 10–44)
ANION GAP SERPL CALC-SCNC: 10 MMOL/L (ref 8–16)
ANION GAP SERPL CALC-SCNC: 11 MMOL/L (ref 8–16)
ANION GAP SERPL CALC-SCNC: 11 MMOL/L (ref 8–16)
ANION GAP SERPL CALC-SCNC: 12 MMOL/L (ref 8–16)
ANION GAP SERPL CALC-SCNC: 8 MMOL/L (ref 8–16)
APTT BLDCRRT: 37.3 SEC (ref 21–32)
AST SERPL-CCNC: 38 U/L (ref 10–40)
BASOPHILS # BLD AUTO: 0.02 K/UL (ref 0–0.2)
BASOPHILS NFR BLD: 0.3 % (ref 0–1.9)
BILIRUB SERPL-MCNC: 0.5 MG/DL (ref 0.1–1)
BUN SERPL-MCNC: 13 MG/DL (ref 6–20)
BUN SERPL-MCNC: 14 MG/DL (ref 6–20)
BUN SERPL-MCNC: 18 MG/DL (ref 6–20)
CALCIUM SERPL-MCNC: 8.9 MG/DL (ref 8.7–10.5)
CALCIUM SERPL-MCNC: 9.5 MG/DL (ref 8.7–10.5)
CALCIUM SERPL-MCNC: 9.6 MG/DL (ref 8.7–10.5)
CALCIUM SERPL-MCNC: 9.6 MG/DL (ref 8.7–10.5)
CALCIUM SERPL-MCNC: 9.7 MG/DL (ref 8.7–10.5)
CHLORIDE SERPL-SCNC: 100 MMOL/L (ref 95–110)
CHLORIDE SERPL-SCNC: 96 MMOL/L (ref 95–110)
CHLORIDE SERPL-SCNC: 97 MMOL/L (ref 95–110)
CHLORIDE SERPL-SCNC: 97 MMOL/L (ref 95–110)
CHLORIDE SERPL-SCNC: 98 MMOL/L (ref 95–110)
CO2 SERPL-SCNC: 27 MMOL/L (ref 23–29)
CO2 SERPL-SCNC: 28 MMOL/L (ref 23–29)
CO2 SERPL-SCNC: 28 MMOL/L (ref 23–29)
CO2 SERPL-SCNC: 29 MMOL/L (ref 23–29)
CO2 SERPL-SCNC: 30 MMOL/L (ref 23–29)
CREAT SERPL-MCNC: 1.7 MG/DL (ref 0.5–1.4)
CREAT SERPL-MCNC: 1.9 MG/DL (ref 0.5–1.4)
CREAT SERPL-MCNC: 1.9 MG/DL (ref 0.5–1.4)
DIFFERENTIAL METHOD: ABNORMAL
EOSINOPHIL # BLD AUTO: 0.1 K/UL (ref 0–0.5)
EOSINOPHIL NFR BLD: 1.4 % (ref 0–8)
ERYTHROCYTE [DISTWIDTH] IN BLOOD BY AUTOMATED COUNT: 14.8 % (ref 11.5–14.5)
EST. GFR  (AFRICAN AMERICAN): 45.8 ML/MIN/1.73 M^2
EST. GFR  (AFRICAN AMERICAN): 45.8 ML/MIN/1.73 M^2
EST. GFR  (AFRICAN AMERICAN): 52.4 ML/MIN/1.73 M^2
EST. GFR  (NON AFRICAN AMERICAN): 39.7 ML/MIN/1.73 M^2
EST. GFR  (NON AFRICAN AMERICAN): 39.7 ML/MIN/1.73 M^2
EST. GFR  (NON AFRICAN AMERICAN): 45.4 ML/MIN/1.73 M^2
GLUCOSE SERPL-MCNC: 101 MG/DL (ref 70–110)
GLUCOSE SERPL-MCNC: 108 MG/DL (ref 70–110)
GLUCOSE SERPL-MCNC: 122 MG/DL (ref 70–110)
GLUCOSE SERPL-MCNC: 98 MG/DL (ref 70–110)
GLUCOSE SERPL-MCNC: 99 MG/DL (ref 70–110)
HCT VFR BLD AUTO: 42.4 % (ref 40–54)
HGB BLD-MCNC: 13.1 G/DL (ref 14–18)
IMM GRANULOCYTES # BLD AUTO: 0.01 K/UL (ref 0–0.04)
IMM GRANULOCYTES NFR BLD AUTO: 0.2 % (ref 0–0.5)
INR PPP: 2.1 (ref 0.8–1.2)
LDH SERPL L TO P-CCNC: 286 U/L (ref 110–260)
LYMPHOCYTES # BLD AUTO: 0.7 K/UL (ref 1–4.8)
LYMPHOCYTES NFR BLD: 11.6 % (ref 18–48)
MAGNESIUM SERPL-MCNC: 1.9 MG/DL (ref 1.6–2.6)
MAGNESIUM SERPL-MCNC: 2.1 MG/DL (ref 1.6–2.6)
MCH RBC QN AUTO: 26.3 PG (ref 27–31)
MCHC RBC AUTO-ENTMCNC: 30.9 G/DL (ref 32–36)
MCV RBC AUTO: 85 FL (ref 82–98)
MONOCYTES # BLD AUTO: 0.8 K/UL (ref 0.3–1)
MONOCYTES NFR BLD: 13 % (ref 4–15)
NEUTROPHILS # BLD AUTO: 4.3 K/UL (ref 1.8–7.7)
NEUTROPHILS NFR BLD: 73.5 % (ref 38–73)
NRBC BLD-RTO: 0 /100 WBC
PHOSPHATE SERPL-MCNC: 3.4 MG/DL (ref 2.7–4.5)
PLATELET # BLD AUTO: 219 K/UL (ref 150–350)
PMV BLD AUTO: 10.8 FL (ref 9.2–12.9)
POTASSIUM SERPL-SCNC: 3.3 MMOL/L (ref 3.5–5.1)
POTASSIUM SERPL-SCNC: 3.3 MMOL/L (ref 3.5–5.1)
POTASSIUM SERPL-SCNC: 3.4 MMOL/L (ref 3.5–5.1)
POTASSIUM SERPL-SCNC: 3.5 MMOL/L (ref 3.5–5.1)
POTASSIUM SERPL-SCNC: 3.7 MMOL/L (ref 3.5–5.1)
POTASSIUM SERPL-SCNC: 4 MMOL/L (ref 3.5–5.1)
PROT SERPL-MCNC: 7.8 G/DL (ref 6–8.4)
PROTHROMBIN TIME: 20.1 SEC (ref 9–12.5)
RBC # BLD AUTO: 4.99 M/UL (ref 4.6–6.2)
SODIUM SERPL-SCNC: 136 MMOL/L (ref 136–145)
SODIUM SERPL-SCNC: 137 MMOL/L (ref 136–145)
SODIUM SERPL-SCNC: 137 MMOL/L (ref 136–145)
WBC # BLD AUTO: 5.84 K/UL (ref 3.9–12.7)

## 2019-10-15 PROCEDURE — 25000003 PHARM REV CODE 250: Performed by: INTERNAL MEDICINE

## 2019-10-15 PROCEDURE — 85730 THROMBOPLASTIN TIME PARTIAL: CPT

## 2019-10-15 PROCEDURE — 63600175 PHARM REV CODE 636 W HCPCS: Performed by: INTERNAL MEDICINE

## 2019-10-15 PROCEDURE — 80048 BASIC METABOLIC PNL TOTAL CA: CPT | Mod: 91

## 2019-10-15 PROCEDURE — 25000003 PHARM REV CODE 250: Performed by: STUDENT IN AN ORGANIZED HEALTH CARE EDUCATION/TRAINING PROGRAM

## 2019-10-15 PROCEDURE — 63600175 PHARM REV CODE 636 W HCPCS: Performed by: STUDENT IN AN ORGANIZED HEALTH CARE EDUCATION/TRAINING PROGRAM

## 2019-10-15 PROCEDURE — 85610 PROTHROMBIN TIME: CPT

## 2019-10-15 PROCEDURE — 94761 N-INVAS EAR/PLS OXIMETRY MLT: CPT

## 2019-10-15 PROCEDURE — 85025 COMPLETE CBC W/AUTO DIFF WBC: CPT

## 2019-10-15 PROCEDURE — 27000248 HC VAD-ADDITIONAL DAY

## 2019-10-15 PROCEDURE — 83615 LACTATE (LD) (LDH) ENZYME: CPT

## 2019-10-15 PROCEDURE — 84132 ASSAY OF SERUM POTASSIUM: CPT

## 2019-10-15 PROCEDURE — 80053 COMPREHEN METABOLIC PANEL: CPT

## 2019-10-15 PROCEDURE — 83735 ASSAY OF MAGNESIUM: CPT

## 2019-10-15 PROCEDURE — 99291 PR CRITICAL CARE, E/M 30-74 MINUTES: ICD-10-PCS | Mod: 25,,, | Performed by: INTERNAL MEDICINE

## 2019-10-15 PROCEDURE — 83735 ASSAY OF MAGNESIUM: CPT | Mod: 91

## 2019-10-15 PROCEDURE — 20000000 HC ICU ROOM

## 2019-10-15 PROCEDURE — 93750 PR INTERROGATE VENT ASSIST DEV, IN PERSON, W PHYSICIAN ANALYSIS: ICD-10-PCS | Mod: ,,, | Performed by: INTERNAL MEDICINE

## 2019-10-15 PROCEDURE — 99291 CRITICAL CARE FIRST HOUR: CPT | Mod: 25,,, | Performed by: INTERNAL MEDICINE

## 2019-10-15 PROCEDURE — 84100 ASSAY OF PHOSPHORUS: CPT

## 2019-10-15 PROCEDURE — 93750 INTERROGATION VAD IN PERSON: CPT | Mod: ,,, | Performed by: INTERNAL MEDICINE

## 2019-10-15 RX ORDER — POTASSIUM CHLORIDE 20 MEQ/1
40 TABLET, EXTENDED RELEASE ORAL ONCE
Status: COMPLETED | OUTPATIENT
Start: 2019-10-15 | End: 2019-10-15

## 2019-10-15 RX ORDER — LANOLIN ALCOHOL/MO/W.PET/CERES
400 CREAM (GRAM) TOPICAL ONCE
Status: DISCONTINUED | OUTPATIENT
Start: 2019-10-16 | End: 2019-10-15

## 2019-10-15 RX ORDER — POTASSIUM CHLORIDE 7.45 MG/ML
10 INJECTION INTRAVENOUS
Status: COMPLETED | OUTPATIENT
Start: 2019-10-15 | End: 2019-10-15

## 2019-10-15 RX ORDER — POTASSIUM CHLORIDE 20 MEQ/1
40 TABLET, EXTENDED RELEASE ORAL ONCE
Status: DISCONTINUED | OUTPATIENT
Start: 2019-10-16 | End: 2019-10-15

## 2019-10-15 RX ORDER — AMIODARONE HYDROCHLORIDE 150 MG/3ML
INJECTION, SOLUTION INTRAVENOUS
Status: DISCONTINUED
Start: 2019-10-15 | End: 2019-10-15 | Stop reason: WASHOUT

## 2019-10-15 RX ORDER — LANOLIN ALCOHOL/MO/W.PET/CERES
400 CREAM (GRAM) TOPICAL ONCE
Status: COMPLETED | OUTPATIENT
Start: 2019-10-15 | End: 2019-10-15

## 2019-10-15 RX ORDER — POTASSIUM CHLORIDE 20 MEQ/1
40 TABLET, EXTENDED RELEASE ORAL 3 TIMES DAILY
Status: DISCONTINUED | OUTPATIENT
Start: 2019-10-15 | End: 2019-10-16

## 2019-10-15 RX ADMIN — MESALAMINE 1000 MG: 250 CAPSULE ORAL at 01:10

## 2019-10-15 RX ADMIN — AMLODIPINE BESYLATE 10 MG: 10 TABLET ORAL at 08:10

## 2019-10-15 RX ADMIN — POTASSIUM CHLORIDE 40 MEQ: 20 TABLET, EXTENDED RELEASE ORAL at 10:10

## 2019-10-15 RX ADMIN — MAGNESIUM OXIDE TAB 400 MG (241.3 MG ELEMENTAL MG) 400 MG: 400 (241.3 MG) TAB at 08:10

## 2019-10-15 RX ADMIN — MAGNESIUM OXIDE 400 MG (241.3 MG MAGNESIUM) TABLET 400 MG: TABLET at 11:10

## 2019-10-15 RX ADMIN — MESALAMINE 1000 MG: 250 CAPSULE ORAL at 08:10

## 2019-10-15 RX ADMIN — FUROSEMIDE 40 MG/HR: 10 INJECTION, SOLUTION INTRAMUSCULAR; INTRAVENOUS at 04:10

## 2019-10-15 RX ADMIN — WARFARIN SODIUM 7.5 MG: 7.5 TABLET ORAL at 04:10

## 2019-10-15 RX ADMIN — MESALAMINE 1000 MG: 250 CAPSULE ORAL at 04:10

## 2019-10-15 RX ADMIN — FUROSEMIDE 40 MG/HR: 10 INJECTION, SOLUTION INTRAMUSCULAR; INTRAVENOUS at 05:10

## 2019-10-15 RX ADMIN — POTASSIUM CHLORIDE 10 MEQ: 7.46 INJECTION, SOLUTION INTRAVENOUS at 08:10

## 2019-10-15 RX ADMIN — FUROSEMIDE 40 MG/HR: 10 INJECTION, SOLUTION INTRAMUSCULAR; INTRAVENOUS at 08:10

## 2019-10-15 RX ADMIN — AMIODARONE HYDROCHLORIDE 200 MG: 200 TABLET ORAL at 08:10

## 2019-10-15 RX ADMIN — POTASSIUM CHLORIDE 40 MEQ: 20 TABLET, EXTENDED RELEASE ORAL at 07:10

## 2019-10-15 RX ADMIN — MESALAMINE 1000 MG: 250 CAPSULE ORAL at 09:10

## 2019-10-15 RX ADMIN — FERROUS GLUCONATE TAB 324 MG (37.5 MG ELEMENTAL IRON) 324 MG: 324 (37.5 FE) TAB at 08:10

## 2019-10-15 RX ADMIN — POTASSIUM CHLORIDE 40 MEQ: 20 TABLET, EXTENDED RELEASE ORAL at 11:10

## 2019-10-15 RX ADMIN — POTASSIUM CHLORIDE 40 MEQ: 20 TABLET, EXTENDED RELEASE ORAL at 03:10

## 2019-10-15 RX ADMIN — POTASSIUM CHLORIDE 10 MEQ: 7.46 INJECTION, SOLUTION INTRAVENOUS at 10:10

## 2019-10-15 RX ADMIN — MAGNESIUM OXIDE TAB 400 MG (241.3 MG ELEMENTAL MG) 400 MG: 400 (241.3 MG) TAB at 03:10

## 2019-10-15 RX ADMIN — POTASSIUM CHLORIDE 40 MEQ: 20 TABLET, EXTENDED RELEASE ORAL at 06:10

## 2019-10-15 RX ADMIN — FUROSEMIDE 40 MG/HR: 10 INJECTION, SOLUTION INTRAMUSCULAR; INTRAVENOUS at 10:10

## 2019-10-15 RX ADMIN — POTASSIUM CHLORIDE 40 MEQ: 20 TABLET, EXTENDED RELEASE ORAL at 08:10

## 2019-10-15 RX ADMIN — PANTOPRAZOLE SODIUM 40 MG: 40 TABLET, DELAYED RELEASE ORAL at 08:10

## 2019-10-15 NOTE — NURSING
Notified Dr. Nolen about Cr increasing slightly to 1.9. Will repeat CMP in 2 hours to monitor Cr. MD aware K = 4.0.  Also notified about MAP being in the 80s. Will continue to monitor MAPs. MAP goal <85. MD aware temperature = 99.1 orally. Will continue to monitor patient.

## 2019-10-15 NOTE — PLAN OF CARE
CMICU DAILY GOALS       A: Awake    RASS: Goal - RASS Goal: 0-->alert and calm  Actual - RASS (Newman Agitation-Sedation Scale): 0-->alert and calm   Restraint necessity:    B: Breath   SBT: NA   C: Coordinate A & B, analgesics/sedatives   Pain: controlled   SAT: NA  D: Delirium   CAM-ICU: Overall CAM-ICU: Negative  E: Early Mobility   MOVE Screen: Pass   Activity: Activity Management: activity adjusted per tolerance  FAS: Feeding/Nutrition   Diet order: Diet/Nutrition Received: 2 gram sodium,   Fluid restriction: Fluid Requirement: 1500cc FR  T: Thrombus   DVT prophylaxis: VTE Required Core Measure: Patient refused interventions  H: HOB Elevation   Head of Bed (HOB): HOB at 20-30 degrees  U: Ulcer Prophylaxis   GI: no  G: Glucose control   managed    S: Skin   Bundle compliance: yes   Bathing/Skin Care: bath, chlorhexidine, bath, complete, linen changed Date: 10/14/19  B: Bowel Function   no issues   I: Indwelling Catheters   [place] necessity:     CVC necessity: No   IPAD offered: No  D: De-escalation Antibx   No  Plan for the day   Plan to get echo on Wednesday.   Family/Goals of care/Code Status   Code Status: Full Code     No acute events throughout day. AAOx4; MAXWELL; No alarms on LVAD, Doppler 88,88, 90. No signs and symptoms of discomfort. UO good. No BM during shift. VS and assessment per flow sheet, patient progressing towards goals as tolerated, plan of care reviewed with Tim Richards and family, all concerns addressed, will continue to monitor.    Dang Mendez

## 2019-10-15 NOTE — ASSESSMENT & PLAN NOTE
- Continue lasix at 40 mg/hr for another day (At home on Torsemide 40/20)  - UO again with 7 lt since last night with negative 5 lt today.  - TTE: LV 10 cm ( changed from prior TTE 7 cm), Moderately reduced right ventricular systolic function. Grade II (moderate) left ventricular diastolic dysfunction consistent with pseudonormalization. Severe biatrial enlargement. Mild-to-moderate mitral regurgitation. Mild tricuspid regurgitation. The estimated PA systolic pressure is 43 mm Hg. Intermediate central venous pressure (8 mm Hg)  - Encouraged to quit smoking if considering OHTx as future options

## 2019-10-15 NOTE — NURSING
Per MD order, LVAD Speed increased to 40230 rpm. Low speed limit increased to 9600 rpm. Back up Heartmate 2 speed increased and low speed limit increased as well.  Notified EMILY Mullen (VAD Coordinator). Will continue to monitor patient.

## 2019-10-15 NOTE — ASSESSMENT & PLAN NOTE
54 yo AAM with s/p HM 2 at 9800, 3/8/18 as DT with repositioning of outflow graft 3/9/18, BiV [employer] AICD, H/O VT shocks prior to LVAD, alcohol use,  CHF stage D, DCM, GI bleed (7/2019), gout, HTN, CKD ( BL - 1.5 - 1.6),came to ED after AICD firing and ADHF  - LDH of 286  - INR of 2.1. Coumadin per Pharmacy.  Home dose 7.5 mg MWF, 5 mg other days  -  ( Bl- 300- 400)    Procedure: Device Interrogation Including analysis of device parameters  Current Settings: Ventricular Assist Device  Review of device function is stable/unstable stable    TXP LVAD INTERROGATIONS 10/15/2019 10/15/2019 10/15/2019 10/15/2019 10/15/2019 10/15/2019 10/15/2019   Type HeartMate II HeartMate II HeartMate II HeartMate II HeartMate II HeartMate II HeartMate II   Flow 5.5 5.2 5.2 5.6 - 6.0 5.6   Speed 9790 9800 9800 9800 - 9800 9800   PI 4.1 3.8 4.1 4.2 - 3.4 4.4   Power ([place]) 6.5 6.3 6.3 6.5 - 6.8 6.5   LSL 9400 9400 9400 9400 - 9400 9400   Pulsatility Intermittent pulse Intermittent pulse Intermittent pulse Intermittent pulse - Intermittent pulse Intermittent pulse

## 2019-10-15 NOTE — PLAN OF CARE
CMICU DAILY GOALS       A: Awake    RASS: Goal - RASS Goal: 0-->alert and calm  Actual - RASS (Newman Agitation-Sedation Scale): 0-->alert and calm   Restraint necessity:    B: Breath   SBT: NA   C: Coordinate A & B, analgesics/sedatives   Pain: managed    SAT: NA  D: Delirium   CAM-ICU: Overall CAM-ICU: Negative  E: Early Mobility   MOVE Screen: Pass   Activity: Activity Management: activity adjusted per tolerance  FAS: Feeding/Nutrition   Diet order: Diet/Nutrition Received: 2 gram sodium, low saturated fat/low cholesterol, restrict fluids,   Fluid restriction: Fluid Requirement: 1500 FR  T: Thrombus   DVT prophylaxis: VTE Required Core Measure: Patient refused interventions  H: HOB Elevation   Head of Bed (HOB): HOB at 20-30 degrees  U: Ulcer Prophylaxis   GI: yes  G: Glucose control   managed    S: Skin   Bundle compliance: yes   Bathing/Skin Care: bath, chlorhexidine, bath, complete, linen changed   B: Bowel Function   no issues   I: Indwelling Catheters   [place] necessity:     CVC necessity: No   IPAD offered: No  D: De-escalation Antibx   Yes  Plan for the day   Continue to dijess, ECHO on Thursday. AICD testing and possible coiling Thursday/Friday.   Family/Goals of care/Code Status   Code Status: Full Code     No acute events throughout day, VS and assessment per flow sheet, patient progressing towards goals as tolerated.  Pt AAO x4. Afebrile. Pt able to ambulate with minimal assistance. VAD present, Doppler BP 88, 86, 88, 88; Flows 5.1 - 5.9; Speed increased to [address]; No alarms on shift;  HR 70s. Sinus arrhthymias with frequent PVCs. Pt remains on room air. Sat >99% intermittent Q4 hours. Pt remains on cardiac diet with 1500 FR. Pt voided ~2L during the shift. 1 large BM this shift.  Skin intact. VAD dressing change 10/14/2019 @ 1600 by wife. Lasix drip.   Plan of care reviewed with Tim Richards and family, all concerns addressed, will continue to monitor.    Nathalia Vidal

## 2019-10-15 NOTE — ASSESSMENT & PLAN NOTE
Admitted for AICD firing for VT->VF with unsuccessful treatment x5. Required defibrillation in ER.   AICD interrogation: 10/12/2019: time 11:52: ATP x 5 / DCCV x 6. MMVT (-290)  S/p unsuccessful ATP x3 , followed by unsuccessful DCCV x 1, followed unsuccessful ATP burst x2 which degenerated into VF s/p unsuccessful DCCV x 5  In ER, underlying rhythm noted to be VF, defib pads placed, loaded Amiodarone 150mg x2, Lidocaine 100mg, Magnesium 2mg, Versed 4mg / Fentanyl 100mcg. Given Unsynchronized 120V DCCV, which was successful, current rhythm AS- 70.   - EP planning DFT as inpatient . Amio gtt held and will continue lower dose amiodarone   - TTE with LVDD 10 cm which may have contributed to his VT in setting of ADHF  - Will follow with DFT and continue with diuresis and repeat TTE by end of week (speed change to 10K RPM on 10/15)

## 2019-10-15 NOTE — SUBJECTIVE & OBJECTIVE
Interval History: No further issues overnight. Denies palpitations of other chest discomfort. No events on telemetry or VAD.   -EP plans for DFT once euvolemic. Amio gtt held and is on low dose PO Aamiodarone   -TTE 10/13 with LVDD 10 cm which may have contributed to VT in setting of ADHF and will plan to increase speed to 10,000 RPM today and continue with IV diuretics (above target wt and +JVD)  -Repeat TTE this Thursday (with contrast)   -Encouraged to quit smoking once again today and he expresses feeling encouraged  -Will be replacing K and monitoring blood levels serially throughout the day for goal K/Mg of >4/2      Continuous Infusions:   furosemide (LASIX) 2 mg/mL infusion (non-titrating) 40 mg/hr (10/15/19 1020)     Scheduled Meds:   amiodarone  200 mg Oral Daily    amLODIPine  10 mg Oral Daily    ferrous gluconate  324 mg Oral Daily with breakfast    magnesium oxide  400 mg Oral TID    mesalamine  1,000 mg Oral QID    pantoprazole  40 mg Oral Daily    potassium chloride  40 mEq Oral TID    warfarin  7.5 mg Oral Daily     PRN Meds:influenza, sodium chloride 0.9%    Review of patient's allergies indicates:   Allergen Reactions    Lisinopril Anaphylaxis     Objective:     Vital Signs (Most Recent):  Temp: 98 °F (36.7 °C) (10/15/19 0701)  Pulse: 70 (10/15/19 1000)  Resp: (!) 22 (10/15/19 1000)  BP: 111/69 (10/15/19 1000)  SpO2: 97 % (10/15/19 0851) Vital Signs (24h Range):  Temp:  [98 °F (36.7 °C)-98.5 °F (36.9 °C)] 98 °F (36.7 °C)  Pulse:  [63-78] 70  Resp:  [17-31] 22  SpO2:  [97 %-99 %] 97 %  BP: ()/(0-81) 111/69     Patient Vitals for the past 72 hrs (Last 3 readings):   Weight   10/15/19 0400 119.3 kg (263 lb 0.1 oz)   10/14/19 0400 122.3 kg (269 lb 11.7 oz)   10/13/19 0100 127.6 kg (281 lb 4.9 oz)     Body mass index is 34.7 kg/m².      Intake/Output Summary (Last 24 hours) at 10/15/2019 1029  Last data filed at 10/15/2019 1017  Gross per 24 hour   Intake 1760 ml   Output 6575 ml   Net  -4815 ml       Hemodynamic Parameters:       Telemetry: No event     Physical Exam   Constitutional: He is oriented to person, place, and time. He appears well-developed.   Neck: JVD (13 cm) present.   Cardiovascular: Normal rate.   Murmur (VAD) heard.  Pulmonary/Chest: Effort normal and breath sounds normal.   Abdominal: Soft. Bowel sounds are normal.   Musculoskeletal: Normal range of motion. He exhibits no edema.   Neurological: He is alert and oriented to person, place, and time. No cranial nerve deficit.   Skin: Skin is warm and dry.     Significant Labs:  CBC:  Recent Labs   Lab 10/13/19  0121 10/14/19  0226 10/15/19  0300   WBC 7.31 6.04 5.84   RBC 4.30* 4.71 4.99   HGB 11.3* 12.1* 13.1*   HCT 38.4* 42.2 42.4    204 219   MCV 89 90 85   MCH 26.3* 25.7* 26.3*   MCHC 29.4* 28.7* 30.9*     BNP:  Recent Labs   Lab 10/10/19  1146 10/12/19  1254   * 809*     CMP:  Recent Labs   Lab 10/10/19  1146 10/12/19  1254  10/14/19  0226  10/14/19  2107 10/15/19  0300 10/15/19  0721 10/15/19  0746   GLU 96 142*   < > 78   < > 116* 98  --  101   CALCIUM 9.1 9.7   < > 9.2   < > 9.2 9.5  --  9.6   ALBUMIN 4.6 4.2  --  3.9  --   --   --   --   --    PROT 8.4 7.9  --  7.5  --   --   --   --   --    * 136   < > 138   < > 136 137  --  137   K 3.6 4.0   < > 3.6   < > 3.2* 3.3* 3.4* 3.5   CO2 28 24   < > 29   < > 30* 30*  --  29   CL 98* 99   < > 99   < > 97 96  --  97   BUN 22* 21*   < > 16   < > 15 14  --  13   CREATININE 2.1* 2.5*   < > 1.6*   < > 1.7* 1.7*  --  1.7*   ALKPHOS 80 92  --  90  --   --   --   --   --    ALT 23 21  --  30  --   --   --   --   --    AST 27 26  --  36  --   --   --   --   --    BILITOT 0.6 0.5  --  0.5  --   --   --   --   --     < > = values in this interval not displayed.      Coagulation:   Recent Labs   Lab 10/13/19  0121 10/14/19  0226 10/15/19  0300   INR 4.3* 2.7* 2.1*   APTT 47.8* 40.5* 37.3*     LDH:  Recent Labs   Lab 10/12/19  1325 10/14/19  0226 10/15/19  0300   *  351* 286*     Microbiology:  Microbiology Results (last 7 days)     ** No results found for the last 168 hours. **          I have reviewed all pertinent labs within the past 24 hours.    Estimated Creatinine Clearance: 68.8 mL/min (A) (based on SCr of 1.7 mg/dL (H)).    Diagnostic Results:  I have reviewed all pertinent imaging results/findings within the past 24 hours.

## 2019-10-15 NOTE — PROGRESS NOTES
10/15/2019  Fitz Christianson    Current provider:  Antonio Hadley Jr.,*      I, Fitz Christianson, rounded on Tim Richards to ensure all mechanical assist device settings (IABP or VAD) were appropriate and all parameters were within limits.  I was able to ensure all back up equipment was present, the staff had no issues, and the Perfusion Department daily rounding was complete.    12:17 PM
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
## 2019-10-16 LAB
ALBUMIN SERPL BCP-MCNC: 3.8 G/DL (ref 3.5–5.2)
ALP SERPL-CCNC: 102 U/L (ref 55–135)
ALT SERPL W/O P-5'-P-CCNC: 28 U/L (ref 10–44)
ANION GAP SERPL CALC-SCNC: 10 MMOL/L (ref 8–16)
ANION GAP SERPL CALC-SCNC: 8 MMOL/L (ref 8–16)
ANION GAP SERPL CALC-SCNC: 9 MMOL/L (ref 8–16)
ANION GAP SERPL CALC-SCNC: 9 MMOL/L (ref 8–16)
AST SERPL-CCNC: 29 U/L (ref 10–40)
BASOPHILS # BLD AUTO: 0.01 K/UL (ref 0–0.2)
BASOPHILS NFR BLD: 0.2 % (ref 0–1.9)
BILIRUB DIRECT SERPL-MCNC: 0.3 MG/DL (ref 0.1–0.3)
BILIRUB SERPL-MCNC: 0.5 MG/DL (ref 0.1–1)
BNP SERPL-MCNC: 192 PG/ML (ref 0–99)
BSA FOR ECHO PROCEDURE: 2.46 M2
BUN SERPL-MCNC: 18 MG/DL (ref 6–20)
BUN SERPL-MCNC: 18 MG/DL (ref 6–20)
BUN SERPL-MCNC: 22 MG/DL (ref 6–20)
BUN SERPL-MCNC: 24 MG/DL (ref 6–20)
CALCIUM SERPL-MCNC: 9.5 MG/DL (ref 8.7–10.5)
CALCIUM SERPL-MCNC: 9.6 MG/DL (ref 8.7–10.5)
CALCIUM SERPL-MCNC: 9.7 MG/DL (ref 8.7–10.5)
CALCIUM SERPL-MCNC: 9.9 MG/DL (ref 8.7–10.5)
CHLORIDE SERPL-SCNC: 100 MMOL/L (ref 95–110)
CHLORIDE SERPL-SCNC: 96 MMOL/L (ref 95–110)
CHLORIDE SERPL-SCNC: 96 MMOL/L (ref 95–110)
CHLORIDE SERPL-SCNC: 98 MMOL/L (ref 95–110)
CO2 SERPL-SCNC: 26 MMOL/L (ref 23–29)
CO2 SERPL-SCNC: 28 MMOL/L (ref 23–29)
CO2 SERPL-SCNC: 29 MMOL/L (ref 23–29)
CO2 SERPL-SCNC: 30 MMOL/L (ref 23–29)
CREAT SERPL-MCNC: 1.7 MG/DL (ref 0.5–1.4)
CREAT SERPL-MCNC: 1.8 MG/DL (ref 0.5–1.4)
CREAT SERPL-MCNC: 1.9 MG/DL (ref 0.5–1.4)
CREAT SERPL-MCNC: 2.1 MG/DL (ref 0.5–1.4)
CRP SERPL-MCNC: 11.1 MG/L (ref 0–8.2)
CV ECHO LV RWT: 0.23 CM
DIFFERENTIAL METHOD: ABNORMAL
DOP CALC LVOT AREA: 3.7 CM2
DOP CALC LVOT DIAMETER: 2.16 CM
E WAVE DECELERATION TIME: 230.26 MSEC
E/A RATIO: 0.77
ECHO LV POSTERIOR WALL: 1.05 CM (ref 0.6–1.1)
EOSINOPHIL # BLD AUTO: 0.1 K/UL (ref 0–0.5)
EOSINOPHIL NFR BLD: 1.2 % (ref 0–8)
ERYTHROCYTE [DISTWIDTH] IN BLOOD BY AUTOMATED COUNT: 14.7 % (ref 11.5–14.5)
EST. GFR  (AFRICAN AMERICAN): 40.6 ML/MIN/1.73 M^2
EST. GFR  (AFRICAN AMERICAN): 45.8 ML/MIN/1.73 M^2
EST. GFR  (AFRICAN AMERICAN): 48.9 ML/MIN/1.73 M^2
EST. GFR  (AFRICAN AMERICAN): 52.4 ML/MIN/1.73 M^2
EST. GFR  (NON AFRICAN AMERICAN): 35.1 ML/MIN/1.73 M^2
EST. GFR  (NON AFRICAN AMERICAN): 39.7 ML/MIN/1.73 M^2
EST. GFR  (NON AFRICAN AMERICAN): 42.3 ML/MIN/1.73 M^2
EST. GFR  (NON AFRICAN AMERICAN): 45.4 ML/MIN/1.73 M^2
FRACTIONAL SHORTENING: 3 % (ref 28–44)
GLUCOSE SERPL-MCNC: 105 MG/DL (ref 70–110)
GLUCOSE SERPL-MCNC: 113 MG/DL (ref 70–110)
GLUCOSE SERPL-MCNC: 118 MG/DL (ref 70–110)
GLUCOSE SERPL-MCNC: 94 MG/DL (ref 70–110)
HCT VFR BLD AUTO: 45.1 % (ref 40–54)
HGB BLD-MCNC: 13.7 G/DL (ref 14–18)
IMM GRANULOCYTES # BLD AUTO: 0.01 K/UL (ref 0–0.04)
IMM GRANULOCYTES NFR BLD AUTO: 0.2 % (ref 0–0.5)
INR PPP: 2.1 (ref 0.8–1.2)
INTERVENTRICULAR SEPTUM: 0.86 CM (ref 0.6–1.1)
LA MAJOR: 6.22 CM
LA MINOR: 6.29 CM
LA WIDTH: 3.65 CM
LDH SERPL L TO P-CCNC: 319 U/L (ref 110–260)
LEFT ATRIUM SIZE: 3.86 CM
LEFT ATRIUM VOLUME INDEX: 31.1 ML/M2
LEFT ATRIUM VOLUME: 74.91 CM3
LEFT INTERNAL DIMENSION IN SYSTOLE: 8.72 CM (ref 2.1–4)
LEFT VENTRICLE DIASTOLIC VOLUME INDEX: 183.37 ML/M2
LEFT VENTRICLE DIASTOLIC VOLUME: 441.12 ML
LEFT VENTRICLE MASS INDEX: 197 G/M2
LEFT VENTRICLE SYSTOLIC VOLUME INDEX: 173.6 ML/M2
LEFT VENTRICLE SYSTOLIC VOLUME: 417.56 ML
LEFT VENTRICULAR INTERNAL DIMENSION IN DIASTOLE: 9 CM (ref 3.5–6)
LEFT VENTRICULAR MASS: 474.5 G
LV LATERAL E/E' RATIO: 35 M/S
LYMPHOCYTES # BLD AUTO: 0.8 K/UL (ref 1–4.8)
LYMPHOCYTES NFR BLD: 13.7 % (ref 18–48)
MAGNESIUM SERPL-MCNC: 2.1 MG/DL (ref 1.6–2.6)
MAGNESIUM SERPL-MCNC: 2.8 MG/DL (ref 1.6–2.6)
MCH RBC QN AUTO: 26.3 PG (ref 27–31)
MCHC RBC AUTO-ENTMCNC: 30.4 G/DL (ref 32–36)
MCV RBC AUTO: 87 FL (ref 82–98)
MONOCYTES # BLD AUTO: 0.9 K/UL (ref 0.3–1)
MONOCYTES NFR BLD: 14.9 % (ref 4–15)
MV PEAK A VEL: 0.91 M/S
MV PEAK E VEL: 0.7 M/S
NEUTROPHILS # BLD AUTO: 4 K/UL (ref 1.8–7.7)
NEUTROPHILS NFR BLD: 69.8 % (ref 38–73)
NRBC BLD-RTO: 0 /100 WBC
PHOSPHATE SERPL-MCNC: 3.1 MG/DL (ref 2.7–4.5)
PISA TR MAX VEL: 2.57 M/S
PLATELET # BLD AUTO: 223 K/UL (ref 150–350)
PMV BLD AUTO: 10.8 FL (ref 9.2–12.9)
POTASSIUM SERPL-SCNC: 3.6 MMOL/L (ref 3.5–5.1)
POTASSIUM SERPL-SCNC: 3.8 MMOL/L (ref 3.5–5.1)
POTASSIUM SERPL-SCNC: 4.1 MMOL/L (ref 3.5–5.1)
POTASSIUM SERPL-SCNC: 4.2 MMOL/L (ref 3.5–5.1)
PREALB SERPL-MCNC: 22 MG/DL (ref 20–43)
PROT SERPL-MCNC: 7.9 G/DL (ref 6–8.4)
PROTHROMBIN TIME: 20.8 SEC (ref 9–12.5)
RA MAJOR: 5.82 CM
RA PRESSURE: 8 MMHG
RA WIDTH: 4.92 CM
RBC # BLD AUTO: 5.21 M/UL (ref 4.6–6.2)
RIGHT VENTRICULAR END-DIASTOLIC DIMENSION: 4.9 CM
SINUS: 3.45 CM
SODIUM SERPL-SCNC: 134 MMOL/L (ref 136–145)
SODIUM SERPL-SCNC: 134 MMOL/L (ref 136–145)
SODIUM SERPL-SCNC: 135 MMOL/L (ref 136–145)
SODIUM SERPL-SCNC: 136 MMOL/L (ref 136–145)
TDI LATERAL: 0.02 M/S
TR MAX PG: 26 MMHG
TRICUSPID ANNULAR PLANE SYSTOLIC EXCURSION: 1.6 CM
TV REST PULMONARY ARTERY PRESSURE: 34 MMHG
WBC # BLD AUTO: 5.71 K/UL (ref 3.9–12.7)

## 2019-10-16 PROCEDURE — 83735 ASSAY OF MAGNESIUM: CPT

## 2019-10-16 PROCEDURE — 85025 COMPLETE CBC W/AUTO DIFF WBC: CPT

## 2019-10-16 PROCEDURE — 63600175 PHARM REV CODE 636 W HCPCS: Performed by: INTERNAL MEDICINE

## 2019-10-16 PROCEDURE — 63600175 PHARM REV CODE 636 W HCPCS: Performed by: STUDENT IN AN ORGANIZED HEALTH CARE EDUCATION/TRAINING PROGRAM

## 2019-10-16 PROCEDURE — 27000248 HC VAD-ADDITIONAL DAY

## 2019-10-16 PROCEDURE — 83615 LACTATE (LD) (LDH) ENZYME: CPT

## 2019-10-16 PROCEDURE — 84100 ASSAY OF PHOSPHORUS: CPT

## 2019-10-16 PROCEDURE — 83735 ASSAY OF MAGNESIUM: CPT | Mod: 91

## 2019-10-16 PROCEDURE — 20000000 HC ICU ROOM

## 2019-10-16 PROCEDURE — 25000003 PHARM REV CODE 250: Performed by: HOSPITALIST

## 2019-10-16 PROCEDURE — 84134 ASSAY OF PREALBUMIN: CPT

## 2019-10-16 PROCEDURE — 80076 HEPATIC FUNCTION PANEL: CPT

## 2019-10-16 PROCEDURE — 86140 C-REACTIVE PROTEIN: CPT

## 2019-10-16 PROCEDURE — 83880 ASSAY OF NATRIURETIC PEPTIDE: CPT

## 2019-10-16 PROCEDURE — 99291 PR CRITICAL CARE, E/M 30-74 MINUTES: ICD-10-PCS | Mod: ,,, | Performed by: INTERNAL MEDICINE

## 2019-10-16 PROCEDURE — 80048 BASIC METABOLIC PNL TOTAL CA: CPT

## 2019-10-16 PROCEDURE — 85610 PROTHROMBIN TIME: CPT

## 2019-10-16 PROCEDURE — 25000003 PHARM REV CODE 250: Performed by: STUDENT IN AN ORGANIZED HEALTH CARE EDUCATION/TRAINING PROGRAM

## 2019-10-16 PROCEDURE — 25000003 PHARM REV CODE 250: Performed by: INTERNAL MEDICINE

## 2019-10-16 PROCEDURE — 80048 BASIC METABOLIC PNL TOTAL CA: CPT | Mod: 91

## 2019-10-16 PROCEDURE — 99291 CRITICAL CARE FIRST HOUR: CPT | Mod: ,,, | Performed by: INTERNAL MEDICINE

## 2019-10-16 RX ORDER — DOXAZOSIN 4 MG/1
8 TABLET ORAL EVERY 12 HOURS
Status: DISCONTINUED | OUTPATIENT
Start: 2019-10-16 | End: 2019-10-18 | Stop reason: HOSPADM

## 2019-10-16 RX ORDER — MAGNESIUM SULFATE HEPTAHYDRATE 40 MG/ML
2 INJECTION, SOLUTION INTRAVENOUS ONCE
Status: COMPLETED | OUTPATIENT
Start: 2019-10-16 | End: 2019-10-16

## 2019-10-16 RX ORDER — POTASSIUM CHLORIDE 7.45 MG/ML
10 INJECTION INTRAVENOUS
Status: COMPLETED | OUTPATIENT
Start: 2019-10-16 | End: 2019-10-16

## 2019-10-16 RX ORDER — WARFARIN 7.5 MG/1
7.5 TABLET ORAL
Status: DISCONTINUED | OUTPATIENT
Start: 2019-10-16 | End: 2019-10-18 | Stop reason: HOSPADM

## 2019-10-16 RX ORDER — LIDOCAINE HYDROCHLORIDE ANHYDROUS AND DEXTROSE MONOHYDRATE .8; 5 G/100ML; G/100ML
1 INJECTION, SOLUTION INTRAVENOUS CONTINUOUS
Status: DISCONTINUED | OUTPATIENT
Start: 2019-10-16 | End: 2019-10-16

## 2019-10-16 RX ORDER — DOXAZOSIN 4 MG/1
8 TABLET ORAL EVERY 12 HOURS
Status: DISCONTINUED | OUTPATIENT
Start: 2019-10-16 | End: 2019-10-16

## 2019-10-16 RX ORDER — POTASSIUM CHLORIDE 20 MEQ/1
60 TABLET, EXTENDED RELEASE ORAL 3 TIMES DAILY
Status: DISCONTINUED | OUTPATIENT
Start: 2019-10-16 | End: 2019-10-17

## 2019-10-16 RX ORDER — POTASSIUM CHLORIDE 20 MEQ/1
20 TABLET, EXTENDED RELEASE ORAL ONCE
Status: COMPLETED | OUTPATIENT
Start: 2019-10-16 | End: 2019-10-16

## 2019-10-16 RX ORDER — WARFARIN SODIUM 5 MG/1
5 TABLET ORAL
Status: DISCONTINUED | OUTPATIENT
Start: 2019-10-17 | End: 2019-10-18 | Stop reason: HOSPADM

## 2019-10-16 RX ORDER — LIDOCAINE HCL/PF 100 MG/5ML
100 SYRINGE (ML) INTRAVENOUS ONCE
Status: COMPLETED | OUTPATIENT
Start: 2019-10-16 | End: 2019-10-16

## 2019-10-16 RX ADMIN — WARFARIN SODIUM 7.5 MG: 7.5 TABLET ORAL at 04:10

## 2019-10-16 RX ADMIN — POTASSIUM CHLORIDE 10 MEQ: 10 INJECTION, SOLUTION INTRAVENOUS at 11:10

## 2019-10-16 RX ADMIN — MAGNESIUM SULFATE IN WATER 2 G: 40 INJECTION, SOLUTION INTRAVENOUS at 02:10

## 2019-10-16 RX ADMIN — FUROSEMIDE 40 MG/HR: 10 INJECTION, SOLUTION INTRAMUSCULAR; INTRAVENOUS at 02:10

## 2019-10-16 RX ADMIN — HUMAN ALBUMIN MICROSPHERES AND PERFLUTREN 0.66 MG: 10; .22 INJECTION, SOLUTION INTRAVENOUS at 03:10

## 2019-10-16 RX ADMIN — POTASSIUM CHLORIDE 10 MEQ: 10 INJECTION, SOLUTION INTRAVENOUS at 10:10

## 2019-10-16 RX ADMIN — POTASSIUM CHLORIDE 60 MEQ: 20 TABLET, EXTENDED RELEASE ORAL at 08:10

## 2019-10-16 RX ADMIN — MESALAMINE 1000 MG: 250 CAPSULE ORAL at 08:10

## 2019-10-16 RX ADMIN — FERROUS GLUCONATE TAB 324 MG (37.5 MG ELEMENTAL IRON) 324 MG: 324 (37.5 FE) TAB at 08:10

## 2019-10-16 RX ADMIN — DOXAZOSIN 8 MG: 4 TABLET ORAL at 06:10

## 2019-10-16 RX ADMIN — AMIODARONE HYDROCHLORIDE 200 MG: 200 TABLET ORAL at 08:10

## 2019-10-16 RX ADMIN — MESALAMINE 1000 MG: 250 CAPSULE ORAL at 04:10

## 2019-10-16 RX ADMIN — LIDOCAINE HYDROCHLORIDE 100 MG: 20 INJECTION, SOLUTION INTRAVENOUS at 01:10

## 2019-10-16 RX ADMIN — MAGNESIUM OXIDE TAB 400 MG (241.3 MG ELEMENTAL MG) 400 MG: 400 (241.3 MG) TAB at 08:10

## 2019-10-16 RX ADMIN — POTASSIUM CHLORIDE 60 MEQ: 20 TABLET, EXTENDED RELEASE ORAL at 02:10

## 2019-10-16 RX ADMIN — MESALAMINE 1000 MG: 250 CAPSULE ORAL at 02:10

## 2019-10-16 RX ADMIN — LIDOCAINE HYDROCHLORIDE 1 MG/MIN: 8 INJECTION, SOLUTION INTRAVENOUS at 02:10

## 2019-10-16 RX ADMIN — FUROSEMIDE 40 MG/HR: 10 INJECTION, SOLUTION INTRAMUSCULAR; INTRAVENOUS at 05:10

## 2019-10-16 RX ADMIN — AMLODIPINE BESYLATE 10 MG: 10 TABLET ORAL at 08:10

## 2019-10-16 RX ADMIN — PANTOPRAZOLE SODIUM 40 MG: 40 TABLET, DELAYED RELEASE ORAL at 08:10

## 2019-10-16 RX ADMIN — POTASSIUM CHLORIDE 20 MEQ: 20 TABLET, EXTENDED RELEASE ORAL at 03:10

## 2019-10-16 NOTE — PROGRESS NOTES
10/16/2019  Kole Mendoza    Current provider:  Antonio Hadley Jr.,*      I, Kole Mendoza, rounded on Tim Richards to ensure all mechanical assist device settings (IABP or VAD) were appropriate and all parameters were within limits.  I was able to ensure all back up equipment was present, the staff had no issues, and the Perfusion Department daily rounding was complete.    7:21 AM

## 2019-10-16 NOTE — ASSESSMENT & PLAN NOTE
54 yo AAM with s/p HM 2 at 9800, 3/8/18 as DT with repositioning of outflow graft 3/9/18, BiV Medtronic AICD, H/O VT shocks prior to LVAD, alcohol use,  CHF stage D, DCM, GI bleed (7/2019), gout, HTN, CKD ( BL - 1.5 - 1.6),came to ED after AICD firing and ADHF  - LDH of 286  - INR of 2.1. Coumadin per Pharmacy.  Home dose 7.5 mg MWF, 5 mg other days  -  ( Bl- 300- 400)    Procedure: Device Interrogation Including analysis of device parameters  Current Settings: Ventricular Assist Device  Review of device function is stable/unstable stable    TXP LVAD INTERROGATIONS 10/16/2019 10/16/2019 10/16/2019 10/16/2019 10/16/2019 10/16/2019 10/16/2019   Type HeartMate II HeartMate II HeartMate II HeartMate II HeartMate II HeartMate II HeartMate II   Flow 5 5.4 4.8 5.0 5.0 4.4 4.8   Speed 08744 39649 49435 53969 26864 40168 20895   PI 3.7 4.3 3.8 4.5 4.6 4.0 4.5   Power (Jackson) 6.6 6.7 6.7 6.5 6.5 6.1 6.3   LSL 9600 9600 9600 9600 9600 9600 9600   Pulsatility Intermittent pulse Intermittent pulse Intermittent pulse Intermittent pulse Intermittent pulse Intermittent pulse Intermittent pulse

## 2019-10-16 NOTE — PROGRESS NOTES
HTS Cross Coverage    Pt had slow run of VT/AIVR that self terminated. Electrolytes repleted. VS stable. Will continue to monitor pt. EP is following along.    Precious Zaragoza, PGY-5 (Cardiology Fellow)     Addendum: additional run of accelerated idioventricular rhythm was seen with pt symptomatic. Will start lidocaine drip 1 mg/min preceded by lidocaine bolus.

## 2019-10-16 NOTE — PLAN OF CARE
CMICU DAILY GOALS       A: Awake    RASS: Goal - RASS Goal: 0-->alert and calm  Actual - RASS (Newman Agitation-Sedation Scale): 0-->alert and calm   Restraint necessity:    B: Breath   SBT: NA   C: Coordinate A & B, analgesics/sedatives   Pain: managed    SAT: NA  D: Delirium   CAM-ICU: Overall CAM-ICU: Negative  E: Early Mobility   MOVE Screen: Pass   Activity: Activity Management: activity adjusted per tolerance  FAS: Feeding/Nutrition   Diet order: Diet/Nutrition Received: low saturated fat/low cholesterol,   Fluid restriction: Fluid Requirement: 1500  T: Thrombus   DVT prophylaxis: VTE Required Core Measure: Patient refused interventions, Pharmacological prophylaxis initiated/maintained  H: HOB Elevation   Head of Bed (HOB): HOB at 30 degrees  U: Ulcer Prophylaxis   GI: yes  G: Glucose control   managed    S: Skin   Bundle compliance: yes   Bathing/Skin Care: bath, chlorhexidine, bath, complete, linen changed Date: 10/16/19  B: Bowel Function   no issues   I: Indwelling Catheters   [place] necessity:     CVC necessity: No   IPAD offered: No  D: De-escalation Antibx   No  Plan for the day   Fluid volume assessment, TTE, electrolyte replacement   Family/Goals of care/Code Status   Code Status: Full Code     No acute events throughout day, VS and assessment per flow sheet, patient progressing towards goals as tolerated, plan of care reviewed with Tim Richards and family, all concerns addressed, will continue to monitor.    Fitz Mcallister

## 2019-10-16 NOTE — SUBJECTIVE & OBJECTIVE
Interval History: Had 2 episodes of AIVR. No shock delivery but patient was mildly symptomatic with palpitations and flushing sensation. Was started on Lidocaine infusion overnight. No events on VAD.   -TTE with contrast today to rule out apical thrombus then EP plans for DFT   -Discontinue IV Lidocaine. Continue with current Amiodarone dose  -Decrease Lasix to 10 mg/hr and transition to PO in the next 1 day    Continuous Infusions:   furosemide (LASIX) 2 mg/mL infusion (non-titrating) 10 mg/hr (10/16/19 1000)     Scheduled Meds:   amiodarone  200 mg Oral Daily    amLODIPine  10 mg Oral Daily    ferrous gluconate  324 mg Oral Daily with breakfast    magnesium oxide  400 mg Oral TID    mesalamine  1,000 mg Oral QID    pantoprazole  40 mg Oral Daily    potassium chloride in water  10 mEq Intravenous Q1H    potassium chloride  60 mEq Oral TID    warfarin  7.5 mg Oral Daily     PRN Meds:influenza, sodium chloride 0.9%    Review of patient's allergies indicates:   Allergen Reactions    Lisinopril Anaphylaxis     Objective:     Vital Signs (Most Recent):  Temp: 98.1 °F (36.7 °C) (10/16/19 0700)  Pulse: 70 (10/16/19 1000)  Resp: (!) 24 (10/16/19 1000)  BP: (!) 85/0 (10/16/19 1000)  SpO2: 95 % (10/16/19 1000) Vital Signs (24h Range):  Temp:  [98.1 °F (36.7 °C)-99.1 °F (37.3 °C)] 98.1 °F (36.7 °C)  Pulse:  [65-79] 70  Resp:  [16-27] 24  SpO2:  [95 %-97 %] 95 %  BP: ()/(0-81) 85/0     Patient Vitals for the past 72 hrs (Last 3 readings):   Weight   10/15/19 1100 118.1 kg (260 lb 4.1 oz)   10/15/19 0400 119.3 kg (263 lb 0.1 oz)   10/14/19 0400 122.3 kg (269 lb 11.7 oz)     Body mass index is 34.34 kg/m².      Intake/Output Summary (Last 24 hours) at 10/16/2019 1143  Last data filed at 10/16/2019 1023  Gross per 24 hour   Intake 1670.79 ml   Output 5850 ml   Net -4179.21 ml       Hemodynamic Parameters:       Telemetry: No event     Physical Exam   Constitutional: He is oriented to person, place, and time. He  appears well-developed.   Neck: No JVD present.   Cardiovascular: Normal rate.   Murmur (VAD) heard.  Pulmonary/Chest: Effort normal and breath sounds normal.   Abdominal: Soft. Bowel sounds are normal.   Musculoskeletal: Normal range of motion. He exhibits no edema.   Neurological: He is alert and oriented to person, place, and time. No cranial nerve deficit.   Skin: Skin is warm and dry.     Significant Labs:  CBC:  Recent Labs   Lab 10/14/19  0226 10/15/19  0300 10/16/19  0205   WBC 6.04 5.84 5.71   RBC 4.71 4.99 5.21   HGB 12.1* 13.1* 13.7*   HCT 42.2 42.4 45.1    219 223   MCV 90 85 87   MCH 25.7* 26.3* 26.3*   MCHC 28.7* 30.9* 30.4*     BNP:  Recent Labs   Lab 10/10/19  1146 10/12/19  1254 10/16/19  0205   * 809* 192*     CMP:  Recent Labs   Lab 10/14/19  0226  10/15/19  1812 10/15/19  2147 10/16/19  0205 10/16/19  0817   GLU 78   < > 122* 108 94 105   CALCIUM 9.2   < > 9.6 8.9 9.6 9.9   ALBUMIN 3.9  --  3.7  --  3.8  --    PROT 7.5  --  7.8  --  7.9  --       < > 136 136 134* 134*   K 3.6   < > 3.7 3.3* 3.8 3.6   CO2 29   < > 27 28 29 30*   CL 99   < > 97 100 96 96   BUN 16   < > 18 18 18 18   CREATININE 1.6*   < > 1.9* 1.7* 1.8* 1.7*   ALKPHOS 90  --  96  --  102  --    ALT 30  --  26  --  28  --    AST 36  --  38  --  29  --    BILITOT 0.5  --  0.5  --  0.5  --     < > = values in this interval not displayed.      Coagulation:   Recent Labs   Lab 10/13/19  0121 10/14/19  0226 10/15/19  0300 10/16/19  0205   INR 4.3* 2.7* 2.1* 2.1*   APTT 47.8* 40.5* 37.3*  --      LDH:  Recent Labs   Lab 10/14/19  0226 10/15/19  0300 10/16/19  0205   * 286* 319*     Microbiology:  Microbiology Results (last 7 days)     ** No results found for the last 168 hours. **          I have reviewed all pertinent labs within the past 24 hours.    Estimated Creatinine Clearance: 68.4 mL/min (A) (based on SCr of 1.7 mg/dL (H)).    Diagnostic Results:  I have reviewed all pertinent imaging results/findings  within the past 24 hours.

## 2019-10-16 NOTE — NURSING
11:15pm-Pt had run of VT that self- terminated. Pt felt light headed . Unable to get good EKG due to LVAD. BP doppler 85/0. K and Mg replaced. [facility]     12:55am-Pt was sleeping and had another run of slow VT that self terminated. Pt said he felt light-headed and flushed. BP on monitor-112/81 (82), BP doppler 98/10. Will repeat doppler BP. Informed MD Zaragoza.    01:45-Updated EMILY Zaragoza still waiting for Lidocaine bolus and drip from pharmacy.           MD ok with that. MD ordered 2 g Magnesium  Sulfate IV and can send all am  labs now.  [facility]  03: 25-Informed MD Zaragoza of K 3.8- Ordered 20 Meq potassium PO. [facility]
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
## 2019-10-16 NOTE — SUBJECTIVE & OBJECTIVE
Interval History: overnight, pt had 2 episodes of AIVR on tele, with the longest episode running 4 mins (23:05:04 - 23:09:27) at 76 bpm. He had associated palpitations with these episodes, that resolved spontaneously when the rhythm terminated. He did not get shocked from his ICD nor did this register as a ventricular arrhythmia on device interrogation.     Review of Systems   Constitution: Negative for chills and fever.   Cardiovascular: Positive for irregular heartbeat and palpitations. Negative for chest pain, dyspnea on exertion, near-syncope and syncope.   Respiratory: Negative for cough and shortness of breath.    Gastrointestinal: Negative for nausea.   Neurological: Negative for headaches and weakness.   Psychiatric/Behavioral: Negative for altered mental status.     Objective:     Vital Signs (Most Recent):  Temp: 98.1 °F (36.7 °C) (10/16/19 0700)  Pulse: 70 (10/16/19 1000)  Resp: (!) 24 (10/16/19 1000)  BP: (!) 85/0 (10/16/19 1000)  SpO2: 95 % (10/16/19 1000) Vital Signs (24h Range):  Temp:  [98.1 °F (36.7 °C)-99.1 °F (37.3 °C)] 98.1 °F (36.7 °C)  Pulse:  [65-79] 70  Resp:  [16-27] 24  SpO2:  [95 %-97 %] 95 %  BP: ()/(0-81) 85/0     Weight: 118.1 kg (260 lb 4.1 oz)  Body mass index is 34.34 kg/m².     SpO2: 95 %  O2 Device (Oxygen Therapy): room air    Physical Exam   Constitutional: He is oriented to person, place, and time. He appears well-developed and well-nourished.   HENT:   Head: Normocephalic and atraumatic.   Cardiovascular: Normal rate and regular rhythm.   Pulmonary/Chest: Effort normal and breath sounds normal. No respiratory distress.   Abdominal: Soft. Bowel sounds are normal. He exhibits no distension.   Musculoskeletal: He exhibits no edema.   Neurological: He is alert and oriented to person, place, and time.   Skin: Skin is warm and dry.   Psychiatric: He has a normal mood and affect. His behavior is normal. Judgment and thought content normal.   Vitals reviewed.      Significant  Labs:   CMP:   Recent Labs   Lab 10/15/19  1812 10/15/19  2147 10/16/19  0205 10/16/19  0817    136 134* 134*   K 3.7 3.3* 3.8 3.6   CL 97 100 96 96   CO2 27 28 29 30*   * 108 94 105   BUN 18 18 18 18   CREATININE 1.9* 1.7* 1.8* 1.7*   CALCIUM 9.6 8.9 9.6 9.9   PROT 7.8  --  7.9  --    ALBUMIN 3.7  --  3.8  --    BILITOT 0.5  --  0.5  --    ALKPHOS 96  --  102  --    AST 38  --  29  --    ALT 26  --  28  --    ANIONGAP 12 8 9 8   ESTGFRAFRICA 45.8* 52.4* 48.9* 52.4*   EGFRNONAA 39.7* 45.4* 42.3* 45.4*    and CBC:   Recent Labs   Lab 10/15/19  0300 10/16/19  0205   WBC 5.84 5.71   HGB 13.1* 13.7*   HCT 42.4 45.1    223       Significant Imaging: n/a

## 2019-10-16 NOTE — CARE UPDATE
HTS contacted for doppler pressure ranging from 88-92. Chart to be reviewed. Will continue to monitor.

## 2019-10-16 NOTE — ASSESSMENT & PLAN NOTE
Admitted with VT-->VF requiring defibrillation in ER after unsuccessfull DCCV by his device  - Had 2 events of AIVR overnight and was started on Lidocaine gtt which will be discontinued   - EP planning DFT and he is on PO Amiodarone   - TTE with LVDD 10 cm which may have contributed to his VT in setting of ADHF

## 2019-10-16 NOTE — PLAN OF CARE
CMICU DAILY GOALS       A: Awake    RASS: Goal - RASS Goal: 0-->alert and calm  Actual - RASS (Newman Agitation-Sedation Scale): 0-->alert and calm   Restraint necessity:    B: Breath   SBT: Not intubated   C: Coordinate A & B, analgesics/sedatives   Pain: managed    SAT: Not intubated  D: Delirium   CAM-ICU: Overall CAM-ICU: Negative  E: Early Mobility   MOVE Screen: Fail   Activity: Activity Management: activity adjusted per tolerance  FAS: Feeding/Nutrition   Diet order: Diet/Nutrition Received: low saturated fat/low cholesterol,   Fluid restriction: Fluid Requirement: 1500  T: Thrombus   DVT prophylaxis: VTE Required Core Measure: Patient refused interventions  H: HOB Elevation   Head of Bed (HOB): HOB at 30 degrees  U: Ulcer Prophylaxis   GI: yes  G: Glucose control   managed    S: Skin   Bundle compliance: yes   Bathing/Skin Care: bath, chlorhexidine, bath, complete, linen changed Date: 10/15/2019-am shift  B: Bowel Function   no issues   I: Indwelling Catheters   [place] necessity:  no   CVC necessity: No   IPAD offered: yes  D: De-escalation Antibx   No  Plan for the day             Continue diuresing. Closely monitor electrolytes and replaced.Latest K -3.8-replaced with 20 meqs. MD Zaragoza said can check BMP at dayshift.  Echo on Thursday and DFT of AICD on Thurs/Friday.  Continue Lidocaine drip.       Family/Goals of care/Code Status   Code Status: Full Code     No acute events throughout day, VS and assessment per flow sheet, patient progressing towards goals as tolerated, plan of care reviewed with Tim Richards and family, all concerns addressed, will continue to monitor.    Yamilet Martines

## 2019-10-16 NOTE — PROGRESS NOTES
Procedure explained. optison given ivp via right arm  sl for imaging . Denied transfusion rxn. Tolerated well. Sl flushed before and after with 10 cc ns. Per teresa lamb sonographer

## 2019-10-16 NOTE — ASSESSMENT & PLAN NOTE
"Tim Richards is a 53 y.o.M with chronic HFrEF (EF 10%) with BiV-ICD implanted in 2014 by Dr. Chiu ([facility] and h/o ICD treated VT in 2018 and LVAD implantation who presented with VT/VF with unsuccessful ICD therapies. Prior to this last event was 10/2018 received DCCV x1. Patient has been on Amiodarone for 1 year 200mg qday,   He reports he felt flushed and lightheaded right before his ICD started to fire x5. EP consulted for VF/VT dilan s/p ICD shock.      - Medtronic ICD interrogated, Mode DDD, underlying rhythm VF, not able to interrogate most recent therapy as "episode still recording". Elevated Optivol Fluid index and thoracic impedance. ICD re-interrogated, event initiated with MMVT () on 10/12/2019 for total of 11:52 ATP x 5 / DCCV x 6 S/p unsuccessful ATP x3 followed by unsuccessful DCCV x 1followed unsuccessful ATP burst x2 which degenerated into VF s/p unsuccessful DCCV x 5.   - in the ER, he received IV amiodarone 150mg x2, Lidocaine 100mg, Magnesium 2mg, Versed 2mg x2 and Fentanyl 50mg x2 s/p unsynchronized DCCV 120J for VT/VF in the setting of ADHF, which successfully converted him into AS- rhythm.   - Amiodarone gtt -> switched back to PO 200mg daily  - trigger likely ADHF, volume overload, amiodarone causing higher defibrillatory tissue threshold, or change in heart-shock vector post LVAD implant. BIV pacing turned off.   - continue diuresis per HTS. Once euvolemic and has chance for RV recovery, will consider 2D ECHO with contrast to r/o thrombus, and DFT to optimize shock therapy prior to discharge.   - Monitor electrolytes Mag >2, K >4  - Keep  and defib pads on board     "

## 2019-10-16 NOTE — PROGRESS NOTES
[facility]  Cardiac Electrophysiology  Progress Note    Admission Date: 10/12/2019  Code Status: Full Code   Attending Physician: Antonio Hadley Jr.,*   Expected Discharge Date: 10/22/2019  Principal Problem:<principal problem not specified>    Subjective:     Interval History: overnight, pt had 2 episodes of AIVR on tele, with the longest episode running 4 mins (23:05:04 - 23:09:27) at 76 bpm. He had associated palpitations with these episodes, that resolved spontaneously when the rhythm terminated. He did not get shocked from his ICD nor did this register as a ventricular arrhythmia on device interrogation.     Review of Systems   Constitution: Negative for chills and fever.   Cardiovascular: Positive for irregular heartbeat and palpitations. Negative for chest pain, dyspnea on exertion, near-syncope and syncope.   Respiratory: Negative for cough and shortness of breath.    Gastrointestinal: Negative for nausea.   Neurological: Negative for headaches and weakness.   Psychiatric/Behavioral: Negative for altered mental status.     Objective:     Vital Signs (Most Recent):  Temp: 98.1 °F (36.7 °C) (10/16/19 0700)  Pulse: 70 (10/16/19 1000)  Resp: (!) 24 (10/16/19 1000)  BP: (!) 85/0 (10/16/19 1000)  SpO2: 95 % (10/16/19 1000) Vital Signs (24h Range):  Temp:  [98.1 °F (36.7 °C)-99.1 °F (37.3 °C)] 98.1 °F (36.7 °C)  Pulse:  [65-79] 70  Resp:  [16-27] 24  SpO2:  [95 %-97 %] 95 %  BP: ()/(0-81) 85/0     Weight: 118.1 kg (260 lb 4.1 oz)  Body mass index is 34.34 kg/m².     SpO2: 95 %  O2 Device (Oxygen Therapy): room air    Physical Exam   Constitutional: He is oriented to person, place, and time. He appears well-developed and well-nourished.   HENT:   Head: Normocephalic and atraumatic.   Cardiovascular: Normal rate and regular rhythm.   Pulmonary/Chest: Effort normal and breath sounds normal. No respiratory distress.   Abdominal: Soft. Bowel sounds are normal. He exhibits no distension.    Musculoskeletal: He exhibits no edema.   Neurological: He is alert and oriented to person, place, and time.   Skin: Skin is warm and dry.   Psychiatric: He has a normal mood and affect. His behavior is normal. Judgment and thought content normal.   Vitals reviewed.      Significant Labs:   CMP:   Recent Labs   Lab 10/15/19  1812 10/15/19  2147 10/16/19  0205 10/16/19  0817    136 134* 134*   K 3.7 3.3* 3.8 3.6   CL 97 100 96 96   CO2 27 28 29 30*   * 108 94 105   BUN 18 18 18 18   CREATININE 1.9* 1.7* 1.8* 1.7*   CALCIUM 9.6 8.9 9.6 9.9   PROT 7.8  --  7.9  --    ALBUMIN 3.7  --  3.8  --    BILITOT 0.5  --  0.5  --    ALKPHOS 96  --  102  --    AST 38  --  29  --    ALT 26  --  28  --    ANIONGAP 12 8 9 8   ESTGFRAFRICA 45.8* 52.4* 48.9* 52.4*   EGFRNONAA 39.7* 45.4* 42.3* 45.4*    and CBC:   Recent Labs   Lab 10/15/19  0300 10/16/19  0205   WBC 5.84 5.71   HGB 13.1* 13.7*   HCT 42.4 45.1    223       Significant Imaging: n/a    Assessment and Plan:     VT (ventricular tachycardia)  Tim Richards is a 53 y.o.M with chronic HFrEF (EF 10%) with BiV-ICD implanted in 2014 by Dr. Chiu ([facility] and h/o ICD treated VT in 2018 and LVAD implantation who presented with VT/VF with unsuccessful ICD therapies. Prior to this last event was 10/2018 received DCCV x1. Patient has been on Amiodarone for 1 year 200mg qday,   He reports he felt flushed and lightheaded right before his ICD started to fire x5. EP consulted for VF/VT dilan s/p ICD shock.      - Medtronic ICD interrogated 10/12: Mode DDD, underlying rhythm VF. Event initiated with MMVT () on 10/12/2019 for total of 11:52 ATP x 5 / DCCV x 6 S/p unsuccessful ATP x3 followed by unsuccessful DCCV x 1followed unsuccessful ATP burst x2 which degenerated into VF s/p unsuccessful DCCV x 5.   - in the ER, he received IV amiodarone 150mg x2, Lidocaine 100mg, Magnesium 2mg, Versed 2mg x2 and Fentanyl 50mg x2 s/p unsynchronized DCCV 120J for  VT/VF in the setting of ADHF, which successfully converted him into AS- rhythm.   - Amiodarone gtt -> switched back to PO 200mg daily  - trigger likely ADHF, volume overload, amiodarone causing higher defibrillatory tissue threshold, or change in heart-shock vector post LVAD implant. BIV pacing turned off.   - continue diuresis per HTS. Once euvolemic and has chance for RV recovery, please order 2D ECHO with contrast to r/o thrombus, and DFT to optimize shock therapy prior to discharge.   - given h/o AIVR - will check if pt is RV pacing  - Monitor electrolytes Mag >2, K >4  - Keep  and defib pads on board     Thank you for the consult. We. Will continue to follow.     Patient seen and plan of care discussed with staff.       Caroline Vasquez MD  Cardiac Electrophysiology  [facility]

## 2019-10-17 LAB
ANION GAP SERPL CALC-SCNC: 10 MMOL/L (ref 8–16)
ANION GAP SERPL CALC-SCNC: 11 MMOL/L (ref 8–16)
BASOPHILS # BLD AUTO: 0.02 K/UL (ref 0–0.2)
BASOPHILS NFR BLD: 0.3 % (ref 0–1.9)
BUN SERPL-MCNC: 21 MG/DL (ref 6–20)
BUN SERPL-MCNC: 23 MG/DL (ref 6–20)
CALCIUM SERPL-MCNC: 9.3 MG/DL (ref 8.7–10.5)
CALCIUM SERPL-MCNC: 9.4 MG/DL (ref 8.7–10.5)
CHLORIDE SERPL-SCNC: 100 MMOL/L (ref 95–110)
CHLORIDE SERPL-SCNC: 99 MMOL/L (ref 95–110)
CO2 SERPL-SCNC: 25 MMOL/L (ref 23–29)
CO2 SERPL-SCNC: 26 MMOL/L (ref 23–29)
CREAT SERPL-MCNC: 1.8 MG/DL (ref 0.5–1.4)
CREAT SERPL-MCNC: 1.8 MG/DL (ref 0.5–1.4)
DIFFERENTIAL METHOD: ABNORMAL
EOSINOPHIL # BLD AUTO: 0.1 K/UL (ref 0–0.5)
EOSINOPHIL NFR BLD: 1.1 % (ref 0–8)
ERYTHROCYTE [DISTWIDTH] IN BLOOD BY AUTOMATED COUNT: 14.6 % (ref 11.5–14.5)
EST. GFR  (AFRICAN AMERICAN): 48.9 ML/MIN/1.73 M^2
EST. GFR  (AFRICAN AMERICAN): 48.9 ML/MIN/1.73 M^2
EST. GFR  (NON AFRICAN AMERICAN): 42.3 ML/MIN/1.73 M^2
EST. GFR  (NON AFRICAN AMERICAN): 42.3 ML/MIN/1.73 M^2
GLUCOSE SERPL-MCNC: 101 MG/DL (ref 70–110)
GLUCOSE SERPL-MCNC: 86 MG/DL (ref 70–110)
HCT VFR BLD AUTO: 45.6 % (ref 40–54)
HGB BLD-MCNC: 14 G/DL (ref 14–18)
IMM GRANULOCYTES # BLD AUTO: 0.01 K/UL (ref 0–0.04)
IMM GRANULOCYTES NFR BLD AUTO: 0.2 % (ref 0–0.5)
INR PPP: 2.2 (ref 0.8–1.2)
LDH SERPL L TO P-CCNC: 314 U/L (ref 110–260)
LYMPHOCYTES # BLD AUTO: 0.6 K/UL (ref 1–4.8)
LYMPHOCYTES NFR BLD: 9.8 % (ref 18–48)
MAGNESIUM SERPL-MCNC: 2.3 MG/DL (ref 1.6–2.6)
MCH RBC QN AUTO: 26.4 PG (ref 27–31)
MCHC RBC AUTO-ENTMCNC: 30.7 G/DL (ref 32–36)
MCV RBC AUTO: 86 FL (ref 82–98)
MONOCYTES # BLD AUTO: 0.8 K/UL (ref 0.3–1)
MONOCYTES NFR BLD: 12.5 % (ref 4–15)
NEUTROPHILS # BLD AUTO: 4.9 K/UL (ref 1.8–7.7)
NEUTROPHILS NFR BLD: 76.1 % (ref 38–73)
NRBC BLD-RTO: 0 /100 WBC
PHOSPHATE SERPL-MCNC: 2.8 MG/DL (ref 2.7–4.5)
PLATELET # BLD AUTO: 226 K/UL (ref 150–350)
PMV BLD AUTO: 10.9 FL (ref 9.2–12.9)
POTASSIUM SERPL-SCNC: 4 MMOL/L (ref 3.5–5.1)
POTASSIUM SERPL-SCNC: 4.3 MMOL/L (ref 3.5–5.1)
PROTHROMBIN TIME: 21.7 SEC (ref 9–12.5)
RBC # BLD AUTO: 5.3 M/UL (ref 4.6–6.2)
SODIUM SERPL-SCNC: 135 MMOL/L (ref 136–145)
SODIUM SERPL-SCNC: 136 MMOL/L (ref 136–145)
WBC # BLD AUTO: 6.41 K/UL (ref 3.9–12.7)

## 2019-10-17 PROCEDURE — 93750 INTERROGATION VAD IN PERSON: CPT | Mod: ,,, | Performed by: INTERNAL MEDICINE

## 2019-10-17 PROCEDURE — 84100 ASSAY OF PHOSPHORUS: CPT

## 2019-10-17 PROCEDURE — 83735 ASSAY OF MAGNESIUM: CPT

## 2019-10-17 PROCEDURE — 99232 PR SUBSEQUENT HOSPITAL CARE,LEVL II: ICD-10-PCS | Mod: 25,,, | Performed by: INTERNAL MEDICINE

## 2019-10-17 PROCEDURE — 93750 PR INTERROGATE VENT ASSIST DEV, IN PERSON, W PHYSICIAN ANALYSIS: ICD-10-PCS | Mod: ,,, | Performed by: INTERNAL MEDICINE

## 2019-10-17 PROCEDURE — 85610 PROTHROMBIN TIME: CPT

## 2019-10-17 PROCEDURE — 99232 SBSQ HOSP IP/OBS MODERATE 35: CPT | Mod: 25,,, | Performed by: INTERNAL MEDICINE

## 2019-10-17 PROCEDURE — 27000248 HC VAD-ADDITIONAL DAY

## 2019-10-17 PROCEDURE — 80048 BASIC METABOLIC PNL TOTAL CA: CPT | Mod: 91

## 2019-10-17 PROCEDURE — 25000003 PHARM REV CODE 250: Performed by: HOSPITALIST

## 2019-10-17 PROCEDURE — 25000003 PHARM REV CODE 250: Performed by: STUDENT IN AN ORGANIZED HEALTH CARE EDUCATION/TRAINING PROGRAM

## 2019-10-17 PROCEDURE — 85025 COMPLETE CBC W/AUTO DIFF WBC: CPT

## 2019-10-17 PROCEDURE — 25000003 PHARM REV CODE 250: Performed by: INTERNAL MEDICINE

## 2019-10-17 PROCEDURE — 20600001 HC STEP DOWN PRIVATE ROOM

## 2019-10-17 PROCEDURE — 83615 LACTATE (LD) (LDH) ENZYME: CPT

## 2019-10-17 RX ORDER — TORSEMIDE 20 MG/1
40 TABLET ORAL DAILY
Status: DISCONTINUED | OUTPATIENT
Start: 2019-10-17 | End: 2019-10-18 | Stop reason: HOSPADM

## 2019-10-17 RX ORDER — TORSEMIDE 20 MG/1
20 TABLET ORAL EVERY EVENING
Status: DISCONTINUED | OUTPATIENT
Start: 2019-10-17 | End: 2019-10-18 | Stop reason: HOSPADM

## 2019-10-17 RX ORDER — SPIRONOLACTONE 25 MG/1
25 TABLET ORAL DAILY
Status: DISCONTINUED | OUTPATIENT
Start: 2019-10-17 | End: 2019-10-18 | Stop reason: HOSPADM

## 2019-10-17 RX ORDER — POTASSIUM CHLORIDE 20 MEQ/1
20 TABLET, EXTENDED RELEASE ORAL DAILY
Status: DISCONTINUED | OUTPATIENT
Start: 2019-10-18 | End: 2019-10-18 | Stop reason: HOSPADM

## 2019-10-17 RX ADMIN — PANTOPRAZOLE SODIUM 40 MG: 40 TABLET, DELAYED RELEASE ORAL at 08:10

## 2019-10-17 RX ADMIN — TORSEMIDE 20 MG: 20 TABLET ORAL at 06:10

## 2019-10-17 RX ADMIN — POTASSIUM CHLORIDE 60 MEQ: 20 TABLET, EXTENDED RELEASE ORAL at 08:10

## 2019-10-17 RX ADMIN — DOXAZOSIN 8 MG: 4 TABLET ORAL at 08:10

## 2019-10-17 RX ADMIN — MESALAMINE 1000 MG: 250 CAPSULE ORAL at 08:10

## 2019-10-17 RX ADMIN — MESALAMINE 1000 MG: 250 CAPSULE ORAL at 01:10

## 2019-10-17 RX ADMIN — MESALAMINE 1000 MG: 250 CAPSULE ORAL at 06:10

## 2019-10-17 RX ADMIN — FERROUS GLUCONATE TAB 324 MG (37.5 MG ELEMENTAL IRON) 324 MG: 324 (37.5 FE) TAB at 08:10

## 2019-10-17 RX ADMIN — AMLODIPINE BESYLATE 10 MG: 10 TABLET ORAL at 08:10

## 2019-10-17 RX ADMIN — SPIRONOLACTONE 25 MG: 25 TABLET, FILM COATED ORAL at 11:10

## 2019-10-17 RX ADMIN — AMIODARONE HYDROCHLORIDE 200 MG: 200 TABLET ORAL at 08:10

## 2019-10-17 RX ADMIN — MAGNESIUM OXIDE TAB 400 MG (241.3 MG ELEMENTAL MG) 400 MG: 400 (241.3 MG) TAB at 08:10

## 2019-10-17 RX ADMIN — MAGNESIUM OXIDE TAB 400 MG (241.3 MG ELEMENTAL MG) 400 MG: 400 (241.3 MG) TAB at 03:10

## 2019-10-17 RX ADMIN — WARFARIN SODIUM 5 MG: 5 TABLET ORAL at 06:10

## 2019-10-17 RX ADMIN — TORSEMIDE 40 MG: 20 TABLET ORAL at 11:10

## 2019-10-17 NOTE — NURSING TRANSFER
Nursing Transfer Note      10/17/2019     Transfer To: [id number]    Transfer via wheelchair    Transfer with cardiac monitoring    Transported by EMILY Valadez    Medicines sent: mesalamine    Chart send with patient: Yes    Notified: pt of transfer to CSU    Patient reassessed at: CSU RN, present at bedside for assessment and handoff.  (10/17/19, 5073)    Upon arrival to floor: cardiac monitor applied, patient oriented to room, call bell in reach and bed in lowest position

## 2019-10-17 NOTE — ASSESSMENT & PLAN NOTE
Admitted with VT-->VF requiring defibrillation in ER after unsuccessfull DCCV by his device  - Had 2 events of AIVR on 10/15 treated with Lidocaine overnight   - EP planning DFT   - TTE with LVDD 9 cm from 10 cm in setting of ADHF; likely contributing to VT

## 2019-10-17 NOTE — SUBJECTIVE & OBJECTIVE
Interval History: No further episodes of AIVR. Did have 6 beats of NSVT. Labs normal. Asymptomatic. No events on VAD.  -TTE with contrast did not comment on LV thrombus or RVF and will try to relay that info to EP who will be wanting to proceed with DCCV/DFT once rule out apical thrombus   -Discontinue IV Lasix in favor of Torsemide 40AM/20PM as per home dose  -Started Aldactone 25 mg for better optimization of BP and will back off on K replacement.   -Continue to monitor electrolytes Q6H  -Will transfer out of ICU     Continuous Infusions:    Scheduled Meds:   amiodarone  200 mg Oral Daily    amLODIPine  10 mg Oral Daily    doxazosin  8 mg Oral Q12H    ferrous gluconate  324 mg Oral Daily with breakfast    magnesium oxide  400 mg Oral TID    mesalamine  1,000 mg Oral QID    pantoprazole  40 mg Oral Daily    potassium chloride  60 mEq Oral TID    spironolactone  25 mg Oral Daily    torsemide  20 mg Oral Daily PM    torsemide  40 mg Oral Daily    warfarin  5 mg Oral Every Tues, Thurs, Sat, Sun    warfarin  7.5 mg Oral Every Mon, Wed, Fri     PRN Meds:influenza, sodium chloride 0.9%    Review of patient's allergies indicates:   Allergen Reactions    Lisinopril Anaphylaxis     Objective:     Vital Signs (Most Recent):  Temp: 98.4 °F (36.9 °C) (10/17/19 0701)  Pulse: 71 (10/17/19 1000)  Resp: 18 (10/17/19 1000)  BP: (!) 90/0(not pulsatile) (10/17/19 0701)  SpO2: 98 % (10/17/19 0701) Vital Signs (24h Range):  Temp:  [98.2 °F (36.8 °C)-98.4 °F (36.9 °C)] 98.4 °F (36.9 °C)  Pulse:  [67-77] 71  Resp:  [14-24] 18  SpO2:  [95 %-98 %] 98 %  BP: ()/(0-76) 90/0     Patient Vitals for the past 72 hrs (Last 3 readings):   Weight   10/17/19 0701 120.3 kg (265 lb 3.4 oz)   10/17/19 0400 120.3 kg (265 lb 3.4 oz)   10/16/19 1543 117.9 kg (260 lb)     Body mass index is 34.99 kg/m².      Intake/Output Summary (Last 24 hours) at 10/17/2019 1026  Last data filed at 10/17/2019 1000  Gross per 24 hour   Intake 1603.25  ml   Output 2435 ml   Net -831.75 ml       Hemodynamic Parameters:       Telemetry: No event     Physical Exam   Constitutional: He is oriented to person, place, and time. He appears well-developed.   Neck: No JVD present.   Cardiovascular: Normal rate.   Murmur (VAD) heard.  Pulmonary/Chest: Effort normal and breath sounds normal.   Abdominal: Soft. Bowel sounds are normal.   Musculoskeletal: Normal range of motion. He exhibits no edema.   Neurological: He is alert and oriented to person, place, and time. No cranial nerve deficit.   Skin: Skin is warm and dry.     Significant Labs:  CBC:  Recent Labs   Lab 10/15/19  0300 10/16/19  0205 10/17/19  0400   WBC 5.84 5.71 6.41   RBC 4.99 5.21 5.30   HGB 13.1* 13.7* 14.0   HCT 42.4 45.1 45.6    223 226   MCV 85 87 86   MCH 26.3* 26.3* 26.4*   MCHC 30.9* 30.4* 30.7*     BNP:  Recent Labs   Lab 10/10/19  1146 10/12/19  1254 10/16/19  0205   * 809* 192*     CMP:  Recent Labs   Lab 10/14/19  0226  10/15/19  1812  10/16/19  0205  10/16/19  0830 10/16/19  1403 10/17/19  0400   GLU 78   < > 122*   < > 94   < > 118* 113* 101   CALCIUM 9.2   < > 9.6   < > 9.6   < > 9.5 9.7 9.4   ALBUMIN 3.9  --  3.7  --  3.8  --   --   --   --    PROT 7.5  --  7.8  --  7.9  --   --   --   --       < > 136   < > 134*   < > 136 135* 136   K 3.6   < > 3.7   < > 3.8   < > 4.2 4.1 4.0   CO2 29   < > 27   < > 29   < > 26 28 25   CL 99   < > 97   < > 96   < > 100 98 100   BUN 16   < > 18   < > 18   < > 24* 22* 23*   CREATININE 1.6*   < > 1.9*   < > 1.8*   < > 2.1* 1.9* 1.8*   ALKPHOS 90  --  96  --  102  --   --   --   --    ALT 30  --  26  --  28  --   --   --   --    AST 36  --  38  --  29  --   --   --   --    BILITOT 0.5  --  0.5  --  0.5  --   --   --   --     < > = values in this interval not displayed.      Coagulation:   Recent Labs   Lab 10/13/19  0121 10/14/19  0226 10/15/19  0300 10/16/19  0205 10/17/19  0400   INR 4.3* 2.7* 2.1* 2.1* 2.2*   APTT 47.8* 40.5* 37.3*  --   --       LDH:  Recent Labs   Lab 10/15/19  0300 10/16/19  0205 10/17/19  0400   * 319* 314*     Microbiology:  Microbiology Results (last 7 days)     ** No results found for the last 168 hours. **          I have reviewed all pertinent labs within the past 24 hours.    Estimated Creatinine Clearance: 65.3 mL/min (A) (based on SCr of 1.8 mg/dL (H)).    Diagnostic Results:  I have reviewed all pertinent imaging results/findings within the past 24 hours.

## 2019-10-17 NOTE — PHYSICIAN QUERY
PT Name: Tim Richards  MR #: [id number]     Physician Query Form - Documentation Clarification      CDS/: Blas Ronquillo Jr, RN               Contact information:[email or web address]    This form is a permanent document in the medical record.     Query Date: October 17, 2019    By submitting this query, we are merely seeking further clarification of documentation. Please utilize your independent clinical judgment when addressing the question(s) below.    The Medical record reflects the following:    Supporting Clinical Findings Location in Medical Record   Appears well perfused    BP=76/0-->90/0-->92/54    Patient was in sustained ventricular fibrillation for 12 hr prior to his presentation.  He was driving to [employer] when he was shocked 6 times by his device today.     VT / VF storm s/p unsuccessful ICD therapy   ADHF in the setting of dietary noncompliance  WAGNER due to cardiorenal syndrome  Lactic acidosis due to ADHF / Shock       furosemide (LASIX) 2 mg/mL in sodium chloride 0.9% 100 mL infusion     54 yo AAM with s/p HM 2 at 9800, 3/8/18 as DT      Acute on chronic combined systolic and diastolic congestive heart failure  - Lasix decreased to 10 mg/hr (At home on Torsemide 40/20)  - UO with 5 lt for negative 3.5 lt today.  - TTE: LV 10 cm ( changed from prior TTE 7 cm), Moderately reduced RVSF, Severe biatrial enlargement. Mild-to-moderate MR/TR.           -Repeat Echo today with contrast   - Encouraged to quit smoking if considering OHTx as future options 10/12 ED Provider Note    10/12 VS Flowsheet     10/12 H&P        10/12 H&P          10/12-10/17 MAR      10/16 Heart Transplant Progress Note    10/16 Heart Transplant Progress Note                                                                                      Doctor, Please specify diagnosis or diagnoses associated with above clinical findings.  Specify the Status of the Shock noted on The H&P    Provider Use Only    (    )Patient was  not in Shock for This admit    (    )Cardiogenic Shock    ( X   )Unspecified Shock    (    )Other__________________________________________________________                                                                                                             [  ] Clinically Undetermined

## 2019-10-17 NOTE — ASSESSMENT & PLAN NOTE
-Lasix discontinued and now on home dose on Torsemide 40/20  - TTE 10/16: LV 9 cm (changed from prior TTE 10 cm), Moderately reduced RVSF   -Will need to comment if presence of LV thrombus and RVSF  - Encouraged to quit smoking if considering OHTx as future options

## 2019-10-17 NOTE — NURSING
Pt had 6-beat run of V tach before self converting to NSR. Pt stated he felt asymptomatic and did not even notice that it happened. Labs reviewed. K 4.0, Mg 2.3. WCTM.

## 2019-10-17 NOTE — PROGRESS NOTES
[facility]  Heart Transplant  Progress Note    Patient Name: Tim Richards  MRN: [id number]  Admission Date: 10/12/2019  Hospital Length of Stay: 5 days  Attending Physician: Antonio Hadley Jr.,*  Primary Care Provider: Ja Méndez MD (Inactive)  Principal Problem:<principal problem not specified>    Subjective:     Interval History: No further episodes of AIVR. Did have 6 beats of NSVT. Labs normal. Asymptomatic. No events on VAD.  -TTE with contrast did not comment on LV thrombus or RVF and will try to relay that info to EP who will be wanting to proceed with DCCV/DFT once rule out apical thrombus   -Discontinue IV Lasix in favor of Torsemide 40AM/20PM as per home dose  -Started Aldactone 25 mg for better optimization of BP and will back off on K replacement.   -Continue to monitor electrolytes Q6H  -Will transfer out of ICU     Continuous Infusions:    Scheduled Meds:   amiodarone  200 mg Oral Daily    amLODIPine  10 mg Oral Daily    doxazosin  8 mg Oral Q12H    ferrous gluconate  324 mg Oral Daily with breakfast    magnesium oxide  400 mg Oral TID    mesalamine  1,000 mg Oral QID    pantoprazole  40 mg Oral Daily    potassium chloride  60 mEq Oral TID    spironolactone  25 mg Oral Daily    torsemide  20 mg Oral Daily PM    torsemide  40 mg Oral Daily    warfarin  5 mg Oral Every Tues, Thurs, Sat, Sun    warfarin  7.5 mg Oral Every Mon, Wed, Fri     PRN Meds:influenza, sodium chloride 0.9%    Review of patient's allergies indicates:   Allergen Reactions    Lisinopril Anaphylaxis     Objective:     Vital Signs (Most Recent):  Temp: 98.4 °F (36.9 °C) (10/17/19 0701)  Pulse: 71 (10/17/19 1000)  Resp: 18 (10/17/19 1000)  BP: (!) 90/0(not pulsatile) (10/17/19 0701)  SpO2: 98 % (10/17/19 0701) Vital Signs (24h Range):  Temp:  [98.2 °F (36.8 °C)-98.4 °F (36.9 °C)] 98.4 °F (36.9 °C)  Pulse:  [67-77] 71  Resp:  [14-24] 18  SpO2:  [95 %-98 %] 98 %  BP: ()/(0-76) 90/0      Patient Vitals for the past 72 hrs (Last 3 readings):   Weight   10/17/19 0701 120.3 kg (265 lb 3.4 oz)   10/17/19 0400 120.3 kg (265 lb 3.4 oz)   10/16/19 1543 117.9 kg (260 lb)     Body mass index is 34.99 kg/m².      Intake/Output Summary (Last 24 hours) at 10/17/2019 1026  Last data filed at 10/17/2019 1000  Gross per 24 hour   Intake 1603.25 ml   Output 2435 ml   Net -831.75 ml       Hemodynamic Parameters:       Telemetry: No event     Physical Exam   Constitutional: He is oriented to person, place, and time. He appears well-developed.   Neck: No JVD present.   Cardiovascular: Normal rate.   Murmur (VAD) heard.  Pulmonary/Chest: Effort normal and breath sounds normal.   Abdominal: Soft. Bowel sounds are normal.   Musculoskeletal: Normal range of motion. He exhibits no edema.   Neurological: He is alert and oriented to person, place, and time. No cranial nerve deficit.   Skin: Skin is warm and dry.     Significant Labs:  CBC:  Recent Labs   Lab 10/15/19  0300 10/16/19  0205 10/17/19  0400   WBC 5.84 5.71 6.41   RBC 4.99 5.21 5.30   HGB 13.1* 13.7* 14.0   HCT 42.4 45.1 45.6    223 226   MCV 85 87 86   MCH 26.3* 26.3* 26.4*   MCHC 30.9* 30.4* 30.7*     BNP:  Recent Labs   Lab 10/10/19  1146 10/12/19  1254 10/16/19  0205   * 809* 192*     CMP:  Recent Labs   Lab 10/14/19  0226  10/15/19  1812  10/16/19  0205  10/16/19  0830 10/16/19  1403 10/17/19  0400   GLU 78   < > 122*   < > 94   < > 118* 113* 101   CALCIUM 9.2   < > 9.6   < > 9.6   < > 9.5 9.7 9.4   ALBUMIN 3.9  --  3.7  --  3.8  --   --   --   --    PROT 7.5  --  7.8  --  7.9  --   --   --   --       < > 136   < > 134*   < > 136 135* 136   K 3.6   < > 3.7   < > 3.8   < > 4.2 4.1 4.0   CO2 29   < > 27   < > 29   < > 26 28 25   CL 99   < > 97   < > 96   < > 100 98 100   BUN 16   < > 18   < > 18   < > 24* 22* 23*   CREATININE 1.6*   < > 1.9*   < > 1.8*   < > 2.1* 1.9* 1.8*   ALKPHOS 90  --  96  --  102  --   --   --   --    ALT 30  --   26  --  28  --   --   --   --    AST 36  --  38  --  29  --   --   --   --    BILITOT 0.5  --  0.5  --  0.5  --   --   --   --     < > = values in this interval not displayed.      Coagulation:   Recent Labs   Lab 10/13/19  0121 10/14/19  0226 10/15/19  0300 10/16/19  0205 10/17/19  0400   INR 4.3* 2.7* 2.1* 2.1* 2.2*   APTT 47.8* 40.5* 37.3*  --   --      LDH:  Recent Labs   Lab 10/15/19  0300 10/16/19  0205 10/17/19  0400   * 319* 314*     Microbiology:  Microbiology Results (last 7 days)     ** No results found for the last 168 hours. **          I have reviewed all pertinent labs within the past 24 hours.    Estimated Creatinine Clearance: 65.3 mL/min (A) (based on SCr of 1.8 mg/dL (H)).    Diagnostic Results:  I have reviewed all pertinent imaging results/findings within the past 24 hours.    Assessment and Plan:     54 yo AAM with s/p HM 2 at 9800, 3/8/18 as DT with repositioning of outflow graft 3/9/18, BiV Medtronic AICD, H/O VT shocks prior to LVAD, alcohol use,  CHF stage D, DCM, GI bleed (7/2019), gout, HTN, CKD ( BL - 1.5 - 1.6),came to ED after AICD firing. As per pt, he was driving his car when he felt flushed and had a chock, he pulled over to let wife take over, 2 more shocks and another 2-3 shocks while driving to ED, with no LOC. Pt was aymptomatic but since last week, had worsening LE edema, SOB, uses 8 pillows since few months, PND, decreased UO despite changing to torsemide and weight gain. He get sSOB with walking up steps and not much active due to that, a change from few months ago. Had one glass of alcohol last night. Having palpitations, LH. No headache, chest pain, syncope, DLES discharge, LFA, fever, chills, blood or black stools.  Took 7.5 mg coumadin  Yesterday ( Home dose 7.5 mg MWF, 5 mg other days).      In ED AICD interrogation: 10/12/2019: time 11:52: ATP x 5 / DCCV x 6   MMVT ()  S/p unsuccessful ATP x3 , followed by unsuccessful DCCV x 1, followed unsuccessful ATP  burst x2 which degenerated into VF s/p unsuccessful DCCV x 5  Underlying rhythm noted to be VF, defib pads placed, loaded Amiodarone 150mg x2, Lidocaine 100mg, Magnesium 2mg, Versed 4mg / Fentanyl 100mcg. Given Unsynchronized 120V DCCV, which was successful, current rhythm AS- 70.     Labs: Hb 12.5, INR 3.7,  <- 578 on 10/10, (baseline 300-400), , Lactate 2.3, Cr 2.5 ( baseline 1.5 - 1.6).    Prior recent history:  10/10/2019: Saw Dr. Bautista in clinic, noticed volume overload with elevated BNP around 500, DC bumex and changed to Torsemide 40 mg daily.  9/2019: Saw Dr. Rhodes in clinic and noticed elevated blood pressures, s/w hydralazine, Wife discontinued it for personal reasons. Increased cardura to 8 mg bid, c/w amlodipine 10 mg daily.   7/2019: Admission for  BRBPR- Had drop in hb from 14 -> 7, requiring transfusion. CT neg. EGD/Colonoscopy positive for non bleeding ulcer in transverse colon and cecal ulcer with clot adherent to the base of the ulcer. Attempted mesenteric US, concern for ischemic colitis but study was poor quality.     Recent imaging:   TTE : 7/17/2019: HM2 LVAD at 9800, AoV not opening, normal RV, LV 7.2 cm.      VT (ventricular tachycardia)  Admitted with VT-->VF requiring defibrillation in ER after unsuccessfull DCCV by his device  - Had 2 events of AIVR on 10/15 treated with Lidocaine overnight   - EP planning DFT   - TTE with LVDD 9 cm from 10 cm in setting of ADHF; likely contributing to VT       LVAD (left ventricular assist device) present  52 yo AAM with s/p HM 2 at 9800, 3/8/18 as DT with repositioning of outflow graft 3/9/18, BiV Medtronic AICD, H/O VT shocks prior to LVAD, alcohol use,  CHF stage D, DCM, GI bleed (7/2019), gout, HTN, CKD ( BL - 1.5 - 1.6),came to ED after AICD firing and ADHF  - LDH of 314  - INR of 2.2. Coumadin per Pharmacy.  Home dose 7.5 mg MWF, 5 mg other days  -  ( Bl- 300- 400)    Procedure: Device Interrogation Including analysis of device  parameters  Current Settings: Ventricular Assist Device  Review of device function is stable/unstable stable    TXP LVAD INTERROGATIONS 10/17/2019 10/17/2019 10/17/2019 10/17/2019 10/17/2019 10/17/2019 10/17/2019   Type HeartMate II HeartMate II HeartMate II HeartMate II HeartMate II HeartMate II HeartMate II   Flow 5.1 5.3 5.2 5.0 4.9 4.8 5.1   Speed 04622 33036 25592 37911 31153 65059 37087   PI 2.9 3.3 3.4 3.8 3.6 3.4 5.2   Power (Jackson) 6.4 6.5 6.6 6.5 6.4 6.4 6.8   LSL 9600 9600 9600 9600 9600 9600 9600   Pulsatility No Pulse No Pulse No Pulse No Pulse Intermittent pulse Intermittent pulse Intermittent pulse       CKD (chronic kidney disease), stage III  - Creatinine trending down from 2.5 -> 1.8, likely cardiorenal  - C/w diuresis    Acute on chronic combined systolic and diastolic congestive heart failure  -Lasix discontinued and now on home dose on Torsemide 40/20  - TTE 10/16: LV 9 cm (changed from prior TTE 10 cm), Moderately reduced RVSF   -Will need to comment if presence of LV thrombus and RVSF  - Encouraged to quit smoking if considering OHTx as future options        Jluis Nolen MD  Heart Transplant  [facility]

## 2019-10-17 NOTE — PROGRESS NOTES
10/17/2019  Ayesha Haas    Current provider:  Antonio Hadley Jr.,*      I, Ayesha Haas, rounded on Tim Richards to ensure all mechanical assist device settings (IABP or VAD) were appropriate and all parameters were within limits.  I was able to ensure all back up equipment was present, the staff had no issues, and the Perfusion Department daily rounding was complete.    9:48 AM

## 2019-10-17 NOTE — PLAN OF CARE
VS and assessment per flow sheet, see below for updates:    Pulmonary: Pt on RA, pulse oximetry q4h, 97-98% O2 Sat. Lung sounds clear.    Cardiovascular: Pt is in NSR/SB HR 60s-70s. IM pulsatile. Doppler/NIBP q4h. Dopplers 88, 86. Pt has Hm2, speed 95188, LSL 9600, Flow 5.1-5.3, PI 3.6-5.3, Pwr 6.4-6.8. No VAD alarms.     Neurological: Pt AAOX4, MAXWELL spontaneously and with good strength, pupils 3 equal, reactive round and brisk, afebrile. No overt deficits noted.     Gastrointestinal: Pt last BM 10/13, bowel sounds audible and normoactive. Pt on cardiac diet, 1500cc FR.     Genitourinary: Pt voids spontaneously per urinal, UO adequate and clear yellow.     Endocrine: n/a.    Skin/Bath: Pt is day bath. Skin intact, HAPI bundle in place. Wife performs VAD change daily on AM shift.  Date of last CHG bath given: 10/16 on PM shift.     Infusions: Pt has lasix gtt infusing at 10mg/hr.     CMICU DAILY GOALS       A: Awake    RASS: Goal - RASS Goal: 0-->alert and calm  Actual - RASS (Newman Agitation-Sedation Scale): 0-->alert and calm   Restraint necessity:    B: Breath   SBT: Not intubated   C: Coordinate A & B, analgesics/sedatives   Pain: managed    SAT: Not intubated  D: Delirium   CAM-ICU: Overall CAM-ICU: Negative  E: Early Mobility   MOVE Screen: Pass   Activity: Activity Management: activity adjusted per tolerance, activity clustered for rest period  FAS: Feeding/Nutrition   Diet order: Diet/Nutrition Received: low saturated fat/low cholesterol, 2 gram sodium(cardiac),   Fluid restriction: Fluid Requirement: 1500 cc FR  T: Thrombus   DVT prophylaxis: VTE Required Core Measure: Patient refused interventions, Pharmacological prophylaxis initiated/maintained  H: HOB Elevation   Head of Bed (HOB): HOB at 30 degrees  U: Ulcer Prophylaxis   GI: yes  G: Glucose control   managed    S: Skin   Bundle compliance: yes   Bathing/Skin Care: bath, chlorhexidine, dressed/undressed, linen changed Date: 10/16 on PM shift  B: Bowel  Function   no issues   I: Indwelling Catheters   [place] necessity:  no   CVC necessity: No   IPAD offered: No  D: De-escalation Antibx   N/A.   Plan for the day   Plan for the day is to try weaning lasix gtt to either PO intake or IVP (EP would like pt to be off of drip prior to their intervention).  Family/Goals of care/Code Status   Code Status: Full Code     No acute events throughout day, VS and assessment per flow sheet, patient progressing towards goals as tolerated, plan of care reviewed with Tim Richards and family, all concerns addressed, will continue to monitor.    Jessica Grace

## 2019-10-17 NOTE — ASSESSMENT & PLAN NOTE
52 yo AAM with s/p HM 2 at 9800, 3/8/18 as DT with repositioning of outflow graft 3/9/18, BiV Medtronic AICD, H/O VT shocks prior to LVAD, alcohol use,  CHF stage D, DCM, GI bleed (7/2019), gout, HTN, CKD ( BL - 1.5 - 1.6),came to ED after AICD firing and ADHF  - LDH of 314  - INR of 2.2. Coumadin per Pharmacy.  Home dose 7.5 mg MWF, 5 mg other days  -  ( Bl- 300- 400)    Procedure: Device Interrogation Including analysis of device parameters  Current Settings: Ventricular Assist Device  Review of device function is stable/unstable stable    TXP LVAD INTERROGATIONS 10/17/2019 10/17/2019 10/17/2019 10/17/2019 10/17/2019 10/17/2019 10/17/2019   Type HeartMate II HeartMate II HeartMate II HeartMate II HeartMate II HeartMate II HeartMate II   Flow 5.1 5.3 5.2 5.0 4.9 4.8 5.1   Speed 06579 81461 97209 98970 08718 77501 01836   PI 2.9 3.3 3.4 3.8 3.6 3.4 5.2   Power (Jackson) 6.4 6.5 6.6 6.5 6.4 6.4 6.8   LSL 9600 9600 9600 9600 9600 9600 9600   Pulsatility No Pulse No Pulse No Pulse No Pulse Intermittent pulse Intermittent pulse Intermittent pulse

## 2019-10-18 ENCOUNTER — ANESTHESIA (OUTPATIENT)
Dept: MEDSURG UNIT | Facility: HOSPITAL | Age: 54
DRG: 308 | End: 2019-10-18
Payer: COMMERCIAL

## 2019-10-18 ENCOUNTER — ANESTHESIA EVENT (OUTPATIENT)
Dept: MEDSURG UNIT | Facility: HOSPITAL | Age: 54
DRG: 308 | End: 2019-10-18
Payer: COMMERCIAL

## 2019-10-18 VITALS
DIASTOLIC BLOOD PRESSURE: 74 MMHG | HEART RATE: 75 BPM | HEIGHT: 73 IN | SYSTOLIC BLOOD PRESSURE: 106 MMHG | BODY MASS INDEX: 34.74 KG/M2 | RESPIRATION RATE: 18 BRPM | OXYGEN SATURATION: 95 % | TEMPERATURE: 99 F | WEIGHT: 262.13 LBS

## 2019-10-18 DIAGNOSIS — Z95.811 HEART REPLACED BY HEART ASSIST DEVICE: ICD-10-CM

## 2019-10-18 DIAGNOSIS — Z95.810 AUTOMATIC IMPLANTABLE CARDIAC DEFIBRILLATOR IN SITU: Primary | ICD-10-CM

## 2019-10-18 LAB
ALBUMIN SERPL BCP-MCNC: 3.6 G/DL (ref 3.5–5.2)
ALP SERPL-CCNC: 95 U/L (ref 55–135)
ALT SERPL W/O P-5'-P-CCNC: 33 U/L (ref 10–44)
ANION GAP SERPL CALC-SCNC: 10 MMOL/L (ref 8–16)
AST SERPL-CCNC: 30 U/L (ref 10–40)
BASOPHILS # BLD AUTO: 0.02 K/UL (ref 0–0.2)
BASOPHILS NFR BLD: 0.4 % (ref 0–1.9)
BILIRUB DIRECT SERPL-MCNC: 0.3 MG/DL (ref 0.1–0.3)
BILIRUB SERPL-MCNC: 0.6 MG/DL (ref 0.1–1)
BNP SERPL-MCNC: 128 PG/ML (ref 0–99)
BUN SERPL-MCNC: 23 MG/DL (ref 6–20)
CALCIUM SERPL-MCNC: 9.7 MG/DL (ref 8.7–10.5)
CHLORIDE SERPL-SCNC: 97 MMOL/L (ref 95–110)
CO2 SERPL-SCNC: 28 MMOL/L (ref 23–29)
CREAT SERPL-MCNC: 1.8 MG/DL (ref 0.5–1.4)
CRP SERPL-MCNC: 13.5 MG/L (ref 0–8.2)
DIFFERENTIAL METHOD: ABNORMAL
EOSINOPHIL # BLD AUTO: 0 K/UL (ref 0–0.5)
EOSINOPHIL NFR BLD: 0.8 % (ref 0–8)
ERYTHROCYTE [DISTWIDTH] IN BLOOD BY AUTOMATED COUNT: 14.8 % (ref 11.5–14.5)
EST. GFR  (AFRICAN AMERICAN): 48.9 ML/MIN/1.73 M^2
EST. GFR  (NON AFRICAN AMERICAN): 42.3 ML/MIN/1.73 M^2
GLUCOSE SERPL-MCNC: 85 MG/DL (ref 70–110)
HCT VFR BLD AUTO: 43.3 % (ref 40–54)
HGB BLD-MCNC: 13.2 G/DL (ref 14–18)
IMM GRANULOCYTES # BLD AUTO: 0.01 K/UL (ref 0–0.04)
IMM GRANULOCYTES NFR BLD AUTO: 0.2 % (ref 0–0.5)
INR PPP: 2.4 (ref 0.8–1.2)
LDH SERPL L TO P-CCNC: 244 U/L (ref 110–260)
LYMPHOCYTES # BLD AUTO: 0.8 K/UL (ref 1–4.8)
LYMPHOCYTES NFR BLD: 16.1 % (ref 18–48)
MAGNESIUM SERPL-MCNC: 2.2 MG/DL (ref 1.6–2.6)
MCH RBC QN AUTO: 25.5 PG (ref 27–31)
MCHC RBC AUTO-ENTMCNC: 30.5 G/DL (ref 32–36)
MCV RBC AUTO: 84 FL (ref 82–98)
MONOCYTES # BLD AUTO: 0.7 K/UL (ref 0.3–1)
MONOCYTES NFR BLD: 13.2 % (ref 4–15)
NEUTROPHILS # BLD AUTO: 3.4 K/UL (ref 1.8–7.7)
NEUTROPHILS NFR BLD: 69.3 % (ref 38–73)
NRBC BLD-RTO: 0 /100 WBC
PHOSPHATE SERPL-MCNC: 3.1 MG/DL (ref 2.7–4.5)
PLATELET # BLD AUTO: 193 K/UL (ref 150–350)
PMV BLD AUTO: 11.2 FL (ref 9.2–12.9)
POTASSIUM SERPL-SCNC: 3.9 MMOL/L (ref 3.5–5.1)
PREALB SERPL-MCNC: 22 MG/DL (ref 20–43)
PROT SERPL-MCNC: 7.5 G/DL (ref 6–8.4)
PROTHROMBIN TIME: 23 SEC (ref 9–12.5)
RBC # BLD AUTO: 5.18 M/UL (ref 4.6–6.2)
SODIUM SERPL-SCNC: 135 MMOL/L (ref 136–145)
WBC # BLD AUTO: 4.91 K/UL (ref 3.9–12.7)

## 2019-10-18 PROCEDURE — 37000008 HC ANESTHESIA 1ST 15 MINUTES: Performed by: INTERNAL MEDICINE

## 2019-10-18 PROCEDURE — 83880 ASSAY OF NATRIURETIC PEPTIDE: CPT

## 2019-10-18 PROCEDURE — 85025 COMPLETE CBC W/AUTO DIFF WBC: CPT

## 2019-10-18 PROCEDURE — D9220A PRA ANESTHESIA: Mod: ANES,,, | Performed by: ANESTHESIOLOGY

## 2019-10-18 PROCEDURE — D9220A PRA ANESTHESIA: ICD-10-PCS | Mod: CRNA,,, | Performed by: NURSE ANESTHETIST, CERTIFIED REGISTERED

## 2019-10-18 PROCEDURE — 25000003 PHARM REV CODE 250: Performed by: INTERNAL MEDICINE

## 2019-10-18 PROCEDURE — 99238 PR HOSPITAL DISCHARGE DAY,<30 MIN: ICD-10-PCS | Mod: 25,,, | Performed by: INTERNAL MEDICINE

## 2019-10-18 PROCEDURE — 36415 COLL VENOUS BLD VENIPUNCTURE: CPT

## 2019-10-18 PROCEDURE — 83735 ASSAY OF MAGNESIUM: CPT

## 2019-10-18 PROCEDURE — 99238 HOSP IP/OBS DSCHRG MGMT 30/<: CPT | Mod: 25,,, | Performed by: INTERNAL MEDICINE

## 2019-10-18 PROCEDURE — 84100 ASSAY OF PHOSPHORUS: CPT

## 2019-10-18 PROCEDURE — 93642 EP EVL 1/2CHMB TRNSVNS CVDFB: CPT | Mod: 26,,, | Performed by: INTERNAL MEDICINE

## 2019-10-18 PROCEDURE — 83615 LACTATE (LD) (LDH) ENZYME: CPT

## 2019-10-18 PROCEDURE — D9220A PRA ANESTHESIA: ICD-10-PCS | Mod: ANES,,, | Performed by: ANESTHESIOLOGY

## 2019-10-18 PROCEDURE — 25000003 PHARM REV CODE 250: Performed by: NURSE ANESTHETIST, CERTIFIED REGISTERED

## 2019-10-18 PROCEDURE — 27000248 HC VAD-ADDITIONAL DAY

## 2019-10-18 PROCEDURE — 80048 BASIC METABOLIC PNL TOTAL CA: CPT

## 2019-10-18 PROCEDURE — 99233 PR SUBSEQUENT HOSPITAL CARE,LEVL III: ICD-10-PCS | Mod: ,,, | Performed by: INTERNAL MEDICINE

## 2019-10-18 PROCEDURE — 63600175 PHARM REV CODE 636 W HCPCS: Performed by: NURSE ANESTHETIST, CERTIFIED REGISTERED

## 2019-10-18 PROCEDURE — 84134 ASSAY OF PREALBUMIN: CPT

## 2019-10-18 PROCEDURE — 80076 HEPATIC FUNCTION PANEL: CPT

## 2019-10-18 PROCEDURE — 86140 C-REACTIVE PROTEIN: CPT

## 2019-10-18 PROCEDURE — 37000009 HC ANESTHESIA EA ADD 15 MINS: Performed by: INTERNAL MEDICINE

## 2019-10-18 PROCEDURE — 93642 EP EVL 1/2CHMB TRNSVNS CVDFB: CPT | Performed by: INTERNAL MEDICINE

## 2019-10-18 PROCEDURE — 93750 INTERROGATION VAD IN PERSON: CPT | Mod: ,,, | Performed by: INTERNAL MEDICINE

## 2019-10-18 PROCEDURE — 93642 PR ELECTROPHYS EVAL,CARDIOVERTER: ICD-10-PCS | Mod: 26,,, | Performed by: INTERNAL MEDICINE

## 2019-10-18 PROCEDURE — 93750 PR INTERROGATE VENT ASSIST DEV, IN PERSON, W PHYSICIAN ANALYSIS: ICD-10-PCS | Mod: ,,, | Performed by: INTERNAL MEDICINE

## 2019-10-18 PROCEDURE — 99233 SBSQ HOSP IP/OBS HIGH 50: CPT | Mod: ,,, | Performed by: INTERNAL MEDICINE

## 2019-10-18 PROCEDURE — 94761 N-INVAS EAR/PLS OXIMETRY MLT: CPT

## 2019-10-18 PROCEDURE — D9220A PRA ANESTHESIA: Mod: CRNA,,, | Performed by: NURSE ANESTHETIST, CERTIFIED REGISTERED

## 2019-10-18 PROCEDURE — 85610 PROTHROMBIN TIME: CPT

## 2019-10-18 RX ORDER — AMIODARONE HYDROCHLORIDE 200 MG/1
TABLET ORAL
Qty: 90 TABLET | Refills: 1 | Status: SHIPPED | OUTPATIENT
Start: 2019-10-18 | End: 2019-10-25 | Stop reason: SDUPTHER

## 2019-10-18 RX ORDER — SPIRONOLACTONE 25 MG/1
25 TABLET ORAL DAILY
Qty: 30 TABLET | Refills: 11 | Status: ON HOLD | OUTPATIENT
Start: 2019-10-19 | End: 2020-01-20 | Stop reason: HOSPADM

## 2019-10-18 RX ORDER — SODIUM CHLORIDE 9 MG/ML
INJECTION, SOLUTION INTRAVENOUS CONTINUOUS PRN
Status: DISCONTINUED | OUTPATIENT
Start: 2019-10-18 | End: 2019-10-18

## 2019-10-18 RX ORDER — PROPOFOL 10 MG/ML
VIAL (ML) INTRAVENOUS
Status: DISCONTINUED | OUTPATIENT
Start: 2019-10-18 | End: 2019-10-18

## 2019-10-18 RX ORDER — LIDOCAINE HYDROCHLORIDE 10 MG/ML
INJECTION, SOLUTION INTRAVENOUS
Status: DISCONTINUED | OUTPATIENT
Start: 2019-10-18 | End: 2019-10-18

## 2019-10-18 RX ORDER — CEFAZOLIN SODIUM 1 G/3ML
2 INJECTION, POWDER, FOR SOLUTION INTRAMUSCULAR; INTRAVENOUS
Status: DISCONTINUED | OUTPATIENT
Start: 2019-10-18 | End: 2019-10-18 | Stop reason: HOSPADM

## 2019-10-18 RX ORDER — MIDAZOLAM HYDROCHLORIDE 1 MG/ML
INJECTION, SOLUTION INTRAMUSCULAR; INTRAVENOUS
Status: DISCONTINUED | OUTPATIENT
Start: 2019-10-18 | End: 2019-10-18

## 2019-10-18 RX ORDER — ETOMIDATE 2 MG/ML
INJECTION INTRAVENOUS
Status: DISCONTINUED | OUTPATIENT
Start: 2019-10-18 | End: 2019-10-18

## 2019-10-18 RX ORDER — SODIUM CHLORIDE 0.9 % (FLUSH) 0.9 %
10 SYRINGE (ML) INJECTION
Status: DISCONTINUED | OUTPATIENT
Start: 2019-10-18 | End: 2019-10-18 | Stop reason: HOSPADM

## 2019-10-18 RX ADMIN — SPIRONOLACTONE 25 MG: 25 TABLET, FILM COATED ORAL at 11:10

## 2019-10-18 RX ADMIN — TORSEMIDE 20 MG: 20 TABLET ORAL at 07:10

## 2019-10-18 RX ADMIN — PANTOPRAZOLE SODIUM 40 MG: 40 TABLET, DELAYED RELEASE ORAL at 10:10

## 2019-10-18 RX ADMIN — ETOMIDATE 12 MG: 2 INJECTION, SOLUTION INTRAVENOUS at 04:10

## 2019-10-18 RX ADMIN — DOXAZOSIN 8 MG: 4 TABLET ORAL at 10:10

## 2019-10-18 RX ADMIN — SODIUM CHLORIDE: 0.9 INJECTION, SOLUTION INTRAVENOUS at 05:10

## 2019-10-18 RX ADMIN — AMIODARONE HYDROCHLORIDE 200 MG: 200 TABLET ORAL at 10:10

## 2019-10-18 RX ADMIN — PROPOFOL 30 MG: 10 INJECTION, EMULSION INTRAVENOUS at 04:10

## 2019-10-18 RX ADMIN — MAGNESIUM OXIDE TAB 400 MG (241.3 MG ELEMENTAL MG) 400 MG: 400 (241.3 MG) TAB at 07:10

## 2019-10-18 RX ADMIN — MESALAMINE 1000 MG: 250 CAPSULE ORAL at 07:10

## 2019-10-18 RX ADMIN — MESALAMINE 1000 MG: 250 CAPSULE ORAL at 10:10

## 2019-10-18 RX ADMIN — TORSEMIDE 40 MG: 20 TABLET ORAL at 10:10

## 2019-10-18 RX ADMIN — SODIUM CHLORIDE: 0.9 INJECTION, SOLUTION INTRAVENOUS at 03:10

## 2019-10-18 RX ADMIN — LIDOCAINE HYDROCHLORIDE 20 MG: 10 INJECTION, SOLUTION INTRAVENOUS at 04:10

## 2019-10-18 RX ADMIN — WARFARIN SODIUM 7.5 MG: 7.5 TABLET ORAL at 07:10

## 2019-10-18 RX ADMIN — MAGNESIUM OXIDE TAB 400 MG (241.3 MG ELEMENTAL MG) 400 MG: 400 (241.3 MG) TAB at 10:10

## 2019-10-18 RX ADMIN — AMLODIPINE BESYLATE 10 MG: 10 TABLET ORAL at 10:10

## 2019-10-18 RX ADMIN — MIDAZOLAM HYDROCHLORIDE 2 MG: 1 INJECTION, SOLUTION INTRAMUSCULAR; INTRAVENOUS at 03:10

## 2019-10-18 RX ADMIN — FERROUS GLUCONATE TAB 324 MG (37.5 MG ELEMENTAL IRON) 324 MG: 324 (37.5 FE) TAB at 10:10

## 2019-10-18 RX ADMIN — POTASSIUM CHLORIDE 20 MEQ: 1500 TABLET, FILM COATED, EXTENDED RELEASE ORAL at 09:10

## 2019-10-18 NOTE — BRIEF OP NOTE
Noninvasive EP study/defibrillation threshold testing performed. VF induced through Medtronic ICD. Device with appropriate detection and successful defibrillation. Minor programming changes made, see  report for full details.     Sedation per anesthesia.     No immediate complications.     Mr Richards tolerated the procedure well.     Given successful DFTs, no plan to revise or implant new leads at this time.     Please call with questions or concerns. EP will be available as needed.     Daren Sanchez MD PGY7

## 2019-10-18 NOTE — SUBJECTIVE & OBJECTIVE
Interval History: No acute events overnight. VS wnl. Pt stepped down from ICU to floor.    Review of Systems   Constitution: Negative for chills and fever.   Cardiovascular: Positive for irregular heartbeat and palpitations. Negative for chest pain, dyspnea on exertion, near-syncope and syncope.   Respiratory: Negative for cough and shortness of breath.    Gastrointestinal: Negative for nausea.   Neurological: Negative for headaches and weakness.   Psychiatric/Behavioral: Negative for altered mental status.     Objective:     Vital Signs (Most Recent):  Temp: 98.2 °F (36.8 °C) (10/18/19 0803)  Pulse: 61 (10/18/19 0825)  Resp: 16 (10/18/19 0803)  BP: (!) 86/0 (10/18/19 0803)  SpO2: 97 % (10/18/19 0803) Vital Signs (24h Range):  Temp:  [97.7 °F (36.5 °C)-98.6 °F (37 °C)] 98.2 °F (36.8 °C)  Pulse:  [61-88] 61  Resp:  [16-20] 16  SpO2:  [96 %-99 %] 97 %  BP: ()/(0-74) 86/0     Weight: 118.9 kg (262 lb 2 oz)  Body mass index is 34.58 kg/m².     SpO2: 97 %  O2 Device (Oxygen Therapy): room air    Physical Exam   Constitutional: He is oriented to person, place, and time. He appears well-developed and well-nourished.   HENT:   Head: Normocephalic and atraumatic.   Cardiovascular: Normal rate and regular rhythm.   Pulmonary/Chest: Effort normal and breath sounds normal. No respiratory distress.   Abdominal: Soft. Bowel sounds are normal. He exhibits no distension.   Musculoskeletal: He exhibits no edema.   Neurological: He is alert and oriented to person, place, and time.   Skin: Skin is warm and dry.   Psychiatric: He has a normal mood and affect. His behavior is normal. Judgment and thought content normal.   Vitals reviewed.    Significant Labs:   CMP:   Recent Labs   Lab 10/16/19  1403 10/17/19  0400 10/17/19  1323   * 136 135*   K 4.1 4.0 4.3   CL 98 100 99   CO2 28 25 26   * 101 86   BUN 22* 23* 21*   CREATININE 1.9* 1.8* 1.8*   CALCIUM 9.7 9.4 9.3   ANIONGAP 9 11 10   ESTGFRAFRICA 45.8* 48.9* 48.9*    EGFRNONAA 39.7* 42.3* 42.3*    and CBC:   Recent Labs   Lab 10/17/19  0400   WBC 6.41   HGB 14.0   HCT 45.6          Significant Imaging:  n/a

## 2019-10-18 NOTE — PROGRESS NOTES
Patient refused labs this AM, stating he does not wish to be stuck. Dr. Zaragoza notified; no verbal orders given at this time. Will continue to monitor.

## 2019-10-18 NOTE — PROGRESS NOTES
Issued pt new [facility]-[id number]/ pt returned nima [facility]. Will follow up at next visit.

## 2019-10-18 NOTE — NURSING TRANSFER
Nursing Transfer Note      10/18/2019     Transfer To: [id number]    Transfer via stretcher    Transfer with cardiac monitoring    Transported by pct    Medicines sent: none    Chart send with patient: Yes    Notified: family
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
## 2019-10-18 NOTE — PROGRESS NOTES
Pt aaox3 while sitting up in bed with wife at bedside.  LVAD history interrogated with no abnormal events noted.  LVAD numbers WNL. Pt denies any needs at this time.  Encouraged pt to notify nurse if they have any questions, problems or concerns for LVAD coordinator. Reviewed with pt plan to keep 11/5/19 appt and pt to have 6 mwt the same day.   Pt verbalized understanding and in agreement of plan. Will follow up with pt soon.

## 2019-10-18 NOTE — PROGRESS NOTES
MD Nolen notified and aware that patient refused labs due to not wanting to be stuck by lab. Patient wants labs drawn through peripheral but that is not done on our floor. MD is going to talk to patient when he rounds.

## 2019-10-18 NOTE — ANESTHESIA POSTPROCEDURE EVALUATION
Anesthesia Post Evaluation    Patient: Tim Richards    Procedure(s) Performed: Procedure(s) (LRB):  CARDIAC ELECTROPHYSIOLOGY STUDY, NONINVASIVE (N/A)  Cardioversion or Defibrillation    Final Anesthesia Type: general (Natural airway)  Patient location during evaluation: PACU  Patient participation: Yes- Able to Participate  Level of consciousness: awake and alert  Post-procedure vital signs: reviewed and stable  Pain management: adequate  Airway patency: patent  PONV status at discharge: No PONV  Anesthetic complications: no      Cardiovascular status: hemodynamically stable  Respiratory status: unassisted  Hydration status: euvolemic  Follow-up not needed.          Vitals Value Taken Time   /70 10/18/2019  4:31 PM   Temp 36.8 °C (98.2 °F) 10/18/2019 11:23 AM   Pulse 76 10/18/2019  4:34 PM   Resp 32 10/18/2019  4:34 PM   SpO2 99 % 10/18/2019  4:29 PM   Vitals shown include unvalidated device data.      No case tracking events are documented in the log.      Pain/Iker Score: No data recorded

## 2019-10-18 NOTE — ASSESSMENT & PLAN NOTE
52 yo AAM with s/p HM 2 at 9800, 3/8/18 as DT with repositioning of outflow graft 3/9/18, BiV Medtronic AICD, H/O VT shocks prior to LVAD, alcohol use,  CHF stage D, DCM, GI bleed (7/2019), gout, HTN, CKD (BL - 1.5 - 1.8),came to ED after AICD firing and ADHF  - LDH of 244  - INR of 2.4 Coumadin per Pharmacy.  Home dose 7.5 mg MWF, 5 mg other days    Procedure: Device Interrogation Including analysis of device parameters  Current Settings: Ventricular Assist Device  Review of device function is stable/unstable stable    TXP LVAD INTERROGATIONS 10/18/2019 10/18/2019 10/17/2019 10/17/2019 10/17/2019 10/17/2019 10/17/2019   Type HeartMate II HeartMate3 HeartMate II HeartMate II HeartMate II HeartMate II HeartMate II   Flow 5.1 5.7 4.9 5.0 4.9 4.9 4.9   Speed 40743 61255 12709 90610 14692 25171 70936   PI 3.8 3.7 3.2 3.7 3.4 3.8 2.9   Power (Jackson) 6.7 6.9 6.4 6.4 6.3 6.4 6.4   LSL 9600 9600 9600 9600 9600 9600 9600   Pulsatility Intermittent pulse Intermittent pulse No Pulse No Pulse No Pulse No Pulse No Pulse

## 2019-10-18 NOTE — PLAN OF CARE
Pt was transferred from ICU. AAOx4, VSS. Pt remains free from fall and injuries throughout shift. No reports of pain. No complaints at this time. All questions answered.

## 2019-10-18 NOTE — PROGRESS NOTES
[facility]  Cardiac Electrophysiology  Progress Note    Admission Date: 10/12/2019  Code Status: Full Code   Attending Physician: Antonio Hadley Jr.,*   Expected Discharge Date: 10/20/2019  Principal Problem:<principal problem not specified>    Subjective:     Interval History: No acute events overnight. VS wnl. Pt stepped down from ICU to floor.    Review of Systems   Constitution: Negative for chills and fever.   Cardiovascular: Positive for irregular heartbeat and palpitations. Negative for chest pain, dyspnea on exertion, near-syncope and syncope.   Respiratory: Negative for cough and shortness of breath.    Gastrointestinal: Negative for nausea.   Neurological: Negative for headaches and weakness.   Psychiatric/Behavioral: Negative for altered mental status.     Objective:     Vital Signs (Most Recent):  Temp: 98.2 °F (36.8 °C) (10/18/19 0803)  Pulse: 61 (10/18/19 0825)  Resp: 16 (10/18/19 0803)  BP: (!) 86/0 (10/18/19 0803)  SpO2: 97 % (10/18/19 0803) Vital Signs (24h Range):  Temp:  [97.7 °F (36.5 °C)-98.6 °F (37 °C)] 98.2 °F (36.8 °C)  Pulse:  [61-88] 61  Resp:  [16-20] 16  SpO2:  [96 %-99 %] 97 %  BP: ()/(0-74) 86/0     Weight: 118.9 kg (262 lb 2 oz)  Body mass index is 34.58 kg/m².     SpO2: 97 %  O2 Device (Oxygen Therapy): room air    Physical Exam   Constitutional: He is oriented to person, place, and time. He appears well-developed and well-nourished.   HENT:   Head: Normocephalic and atraumatic.   Cardiovascular: Normal rate and regular rhythm.   Pulmonary/Chest: Effort normal and breath sounds normal. No respiratory distress.   Abdominal: Soft. Bowel sounds are normal. He exhibits no distension.   Musculoskeletal: He exhibits no edema.   Neurological: He is alert and oriented to person, place, and time.   Skin: Skin is warm and dry.   Psychiatric: He has a normal mood and affect. His behavior is normal. Judgment and thought content normal.   Vitals  reviewed.    Significant Labs:   CMP:   Recent Labs   Lab 10/16/19  1403 10/17/19  0400 10/17/19  1323   * 136 135*   K 4.1 4.0 4.3   CL 98 100 99   CO2 28 25 26   * 101 86   BUN 22* 23* 21*   CREATININE 1.9* 1.8* 1.8*   CALCIUM 9.7 9.4 9.3   ANIONGAP 9 11 10   ESTGFRAFRICA 45.8* 48.9* 48.9*   EGFRNONAA 39.7* 42.3* 42.3*    and CBC:   Recent Labs   Lab 10/17/19  0400   WBC 6.41   HGB 14.0   HCT 45.6          Significant Imaging:  n/a    Assessment and Plan:     VT (ventricular tachycardia)  Tim Richards is a 53 y.o.M with chronic HFrEF (EF 10%) with BiV-ICD implanted in 2014 by Dr. Chiu ([facility] and h/o ICD treated VT in 2018 and LVAD implantation who presented with VT/VF with unsuccessful ICD therapies. Prior to this last event was 10/2018 received DCCV x1. Patient has been on Amiodarone for 1 year 200mg daily. He reports he felt flushed and lightheaded right before his ICD started to fire x5. EP consulted for VF/VT dilan s/p ICD shock.      - Medtronic ICD: MMVT () on 10/12/2019 for total of 11:52 ATP x 5 / DCCV x 6 S/p unsuccessful ATP x3 followed by unsuccessful DCCV x 1 followed unsuccessful ATP burst x2 which degenerated into VF s/p unsuccessful DCCV x 5.   - in the ER, he underwent unsynchronized DCCV 120J for VT/VF in the setting of ADHF, which successfully converted him into AS- rhythm.   - trigger likely ADHF, volume overload, amiodarone causing higher defibrillatory tissue threshold, or change in heart-shock vector post LVAD implant. BIV pacing turned off.   - continue amiodarone PO 200mg daily  - diuresis per HTS. Pt has been switched from IV to PO diuretics 10/17/19.   - TTE 10/16/19 still suggestive of moderately reduced RV function  - will discuss with HTS team for timing of DFT study.   - Monitor electrolytes Mag >2, K >4  - Keep  and defib pads on board     Patient seen and plan of care discussed with Dr. Bernard Vasquez MD  Cardiac  Electrophysiology  [facility]

## 2019-10-18 NOTE — PROGRESS NOTES
10/18/2019  Ayesha Haas    Current provider:  Antonio Hadley Jr.,*      I, Ayesha Haas, rounded on Tim Richards to ensure all mechanical assist device settings (IABP or VAD) were appropriate and all parameters were within limits.  I was able to ensure all back up equipment was present, the staff had no issues, and the Perfusion Department daily rounding was complete.    8:18 AM

## 2019-10-18 NOTE — ADDENDUM NOTE
Addendum  created 10/18/19 1741 by Hoa Huddleston, LEONOR    Intraprocedure Event edited, Intraprocedure Meds edited, Orders acknowledged in Narrator

## 2019-10-18 NOTE — TRANSFER OF CARE
"Anesthesia Transfer of Care Note    Patient: Tim Richards    Procedure(s) Performed: Procedure(s) (LRB):  CARDIAC ELECTROPHYSIOLOGY STUDY, NONINVASIVE (N/A)  Cardioversion or Defibrillation    Patient location: PACU    Anesthesia Type: general    Transport from OR: Transported from OR on 6-10 L/min O2 by face mask with adequate spontaneous ventilation    Post pain: adequate analgesia    Post assessment: no apparent anesthetic complications    Post vital signs: stable    Level of consciousness: awake, alert and oriented    Nausea/Vomiting: no nausea/vomiting    Complications: none    Transfer of care protocol was followed      Last vitals:   Visit Vitals  /70 (BP Location: Left arm, Patient Position: Lying)   Pulse 77   Temp 36.8 °C (98.2 °F) (Oral)   Resp 18   Ht 6' 1" (1.854 m)   Wt 118.9 kg (262 lb 2 oz)   SpO2 99%   BMI 34.58 kg/m²     "

## 2019-10-18 NOTE — NURSING TRANSFER
Nursing Transfer Note      10/18/2019     Transfer To: EP Lab    Transfer via stretcher    Transfer with cardiac monitoring    Transported by transport    Medicines sent: none    Chart send with patient: Yes    Clip and Prep completed prior to leaving. Unable to get IV as patient was frustrated with clip and prep and needing to start a new IV. Verified with EP RN not to give prophylactic ABX, they would administer in procedure if needed.

## 2019-10-18 NOTE — NURSING TRANSFER
Nursing Transfer Note      10/18/2019     Transfer From: EP lab    Transfer via stretcher    Transfer with cardiac monitoring    Transported by RN    Medicines sent: none    Chart send with patient: Yes    Notified: spouse    Patient reassessed at: 1845 10/18/2019   Upon arrival to floor: cardiac monitor applied, patient oriented to room, call bell in reach and bed in lowest position

## 2019-10-18 NOTE — HOSPITAL COURSE
Was diuresed with IV Lasix to dry wt and has , renal function at baseline with Scr 1.8. Wt of 262 from 278 on admission. Had symptomatic AIVR during admission and was started transiently on IV Lidocaine. Had no events on VAD. TTE with contrast did not show LV thrombus and continues with mild to moderate decrease in RVSF which may be in relation to his recent ADHF or post VT storm. Discussed with EP and was taken for DFT/NIEPS and device was reprogrammed to successfully deliver therapy and terminate VT (EP report to follow). He will not need further leads and may follow with EP as outpatient. Continue Torsemide 40AM/20PM as per home dose and was started on Aldactone 25 mg for optimization of BP regulation of K.     His speed was increased to 10,000 RPM and follow up Echo showed:  · Eccentric LVH. EF 10%. No LV thrombus noted with echo contrast. Severe left ventricular enlargement. LVEDD 9 cm (from 10 cm on admission)  · LVAD present[place] [address] RPMs. II. Unable to see if AV opens. The septum is midline  · Moderate right ventricular enlargement. Mildly to moderately reduced right ventricular systolic function.  · Mild-to-moderate mitral regurgitation. Mild tricuspid regurgitation.

## 2019-10-18 NOTE — ASSESSMENT & PLAN NOTE
-Transitioned to Torsemide 40/20  -TTE 10/16: LV 9 cm (changed from prior TTE 10 cm), Moderately reduced RVSF, no LV thrombus  -Encouraged to quit smoking if considering OHTx as future options  -Continue monitoring I/O

## 2019-10-18 NOTE — PROGRESS NOTES
Discharge    Pt presents in room with wife. Pt aaox4 with pleasant affect. Pt states in agreement with plan to discharge to home today or over weekend.  Wife will transport. Pt denies home needs and none are medically indicated. Pt states intention to no longer smoke. Pt reports coping well and denies further needs, questions, concerns at this time and none indicated. Providing psychosocial and counseling support, education, resources, assistance and discharge planning as indicated. SW remains available.

## 2019-10-18 NOTE — ANESTHESIA PREPROCEDURE EVALUATION
10/18/2019  Tim Richards is a 53 y.o., male presenting for NIEPS.    Past Medical History:   Diagnosis Date    CHF (congestive heart failure)     Dilated cardiomyopathy 1/10/2018    Disorder of kidney and ureter     CKD    Gout     HTN (hypertension)     Ventricular tachycardia (paroxysmal)      Past Surgical History:   Procedure Laterality Date    CARDIAC CATHETERIZATION  Dec. 2012    CARDIAC DEFIBRILLATOR PLACEMENT Left     CRRT-D    COLONOSCOPY N/A 3/6/2018    Procedure: COLONOSCOPY;  Surgeon: Alonso Bone MD;  Location: [facility] ENDO (2ND FLR);  Service: Endoscopy;  Laterality: N/A;    COLONOSCOPY N/A 7/17/2019    Procedure: COLONOSCOPY;  Surgeon: Blane Valdez MD;  Location: [facility] ENDO (2ND FLR);  Service: Endoscopy;  Laterality: N/A;    COLONOSCOPY N/A 7/18/2019    Procedure: COLONOSCOPY;  Surgeon: Blane Valdez MD;  Location: [facility] ENDO (2ND FLR);  Service: Endoscopy;  Laterality: N/A;    ESOPHAGOGASTRODUODENOSCOPY N/A 7/17/2019    Procedure: EGD (ESOPHAGOGASTRODUODENOSCOPY);  Surgeon: Blane Valdez MD;  Location: [facility] ENDO (2ND FLR);  Service: Endoscopy;  Laterality: N/A;    ESOPHAGOGASTRODUODENOSCOPY N/A 7/18/2019    Procedure: EGD (ESOPHAGOGASTRODUODENOSCOPY);  Surgeon: Blane Valdez MD;  Location: [facility] ENDO (2ND FLR);  Service: Endoscopy;  Laterality: N/A;     Review of patient's allergies indicates:   Allergen Reactions    Lisinopril Anaphylaxis     No current facility-administered medications on file prior to encounter.      Current Outpatient Medications on File Prior to Encounter   Medication Sig Dispense Refill    allopurinol (ZYLOPRIM) 100 MG tablet TAKE 1 TABLET BY MOUTH EVERY DAY 30 tablet 5    amLODIPine (NORVASC) 10 MG tablet Take 1 tablet (10 mg total) by mouth once daily. 30 tablet 11    doxazosin (CARDURA) 8 MG Tab Take 1 tablet (8 mg total) by mouth every 12 (twelve) hours. 60  tablet 11    ferrous gluconate (FERGON) 324 MG tablet Take 1 tablet (324 mg total) by mouth daily with breakfast. 90 tablet 6    magnesium oxide (MAG-OX) 400 mg (241.3 mg magnesium) tablet TAKE 1 TABLET (400 MG TOTAL) BY MOUTH 3 (THREE) TIMES DAILY. 90 tablet 6    mesalamine (PENTASA) 250 mg CpSR Take 4 capsules (1,000 mg total) by mouth 4 (four) times daily. 480 capsule 2    pantoprazole (PROTONIX) 40 MG tablet TAKE 1 TABLET (40 MG TOTAL) BY MOUTH ONCE DAILY. 30 tablet 11    potassium chloride (K-TAB) 20 mEq Take 1 tablet (20 mEq total) by mouth 2 (two) times daily. 60 tablet 11    torsemide (DEMADEX) 20 MG Tab Take 40 mg (2 tabs) qam and 20 mg (1 tab) qpm for 3 days then 40 mg (2 tabs) daily. 30 tablet 11    warfarin (COUMADIN) 5 MG tablet Take 1.5 tabs by mouth on 7/23 only, followed by weekly dose of 1 tablet daily, except 1.5 tabs on Mon, Wed, Fri 45 tablet 11    sildenafil (VIAGRA) 100 MG tablet Take 1 tablet (100 mg total) by mouth daily as needed for Erectile Dysfunction. 10 tablet 1     Lab Results   Component Value Date    WBC 4.91 10/18/2019    HGB 13.2 (L) 10/18/2019    HCT 43.3 10/18/2019    MCV 84 10/18/2019     10/18/2019     BMP  Lab Results   Component Value Date     (L) 10/18/2019    K 3.9 10/18/2019    CL 97 10/18/2019    CO2 28 10/18/2019    BUN 23 (H) 10/18/2019    CREATININE 1.8 (H) 10/18/2019    CALCIUM 9.7 10/18/2019    ANIONGAP 10 10/18/2019    ESTGFRAFRICA 48.9 (A) 10/18/2019    EGFRNONAA 42.3 (A) 10/18/2019         Anesthesia Evaluation    I have reviewed the Patient Summary Reports.     I have reviewed the Medications.     Review of Systems  Anesthesia Hx:  Personal Hx of Anesthesia complications  Difficult Intubation       Physical Exam  General:  Obesity    Airway/Jaw/Neck:  Airway Findings: Mouth Opening: Normal Tongue: Large  General Airway Assessment: Adult  Mallampati: III  TM Distance: Normal, at least 6 cm  Jaw/Neck Findings:  Neck ROM: Normal ROM       Dental:  Dental Findings: In tact        Mental Status:  Mental Status Findings:  Cooperative         Anesthesia Plan  Type of Anesthesia, risks & benefits discussed:  Anesthesia Type:  general  Patient's Preference:   Intra-op Monitoring Plan: standard ASA monitors  Intra-op Monitoring Plan Comments:   Post Op Pain Control Plan: per primary service following discharge from PACU  Post Op Pain Control Plan Comments:   Induction:   IV  Beta Blocker:  Patient is not currently on a Beta-Blocker (No further documentation required).       Informed Consent: Patient understands risks and agrees with Anesthesia plan.  Questions answered. Anesthesia consent signed with patient.  ASA Score: 4     Day of Surgery Review of History & Physical:    H&P update referred to the surgeon.         Ready For Surgery From Anesthesia Perspective.

## 2019-10-18 NOTE — ASSESSMENT & PLAN NOTE
Admitted with VT-->VF requiring defibrillation in ER after unsuccessfull DCCV x5 by his device requiring defibrillation in ER  - Had 2 events of AIVR on 10/15 treated with Lidocaine overnight   - Has had multiple runs of NSVT asymptomatic   - EP planning DFT today   - TTE with LVDD 9 cm from 10 cm in setting of ADHF; likely contributing to VT
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
## 2019-10-18 NOTE — SUBJECTIVE & OBJECTIVE
Interval History: No further AIVR. Asymptomatic this morning. Complaining about lab draws. No events on VAD. TTE with contrast did not show LV thrombus and continues with mild to moderate decrease in RVSF which may be in relation to his recent ADHF or post VT storm. Discussed with EP and will plan for DFT now that he is euvolemic.  -Continue Torsemide 40AM/20PM as per home dose  -Continue Aldactone 25 mg for optimization of BP regulation of K      Continuous Infusions:    Scheduled Meds:   amiodarone  200 mg Oral Daily    amLODIPine  10 mg Oral Daily    doxazosin  8 mg Oral Q12H    ferrous gluconate  324 mg Oral Daily with breakfast    magnesium oxide  400 mg Oral TID    mesalamine  1,000 mg Oral QID    pantoprazole  40 mg Oral Daily    potassium chloride  20 mEq Oral Daily    spironolactone  25 mg Oral Daily    torsemide  20 mg Oral Daily PM    torsemide  40 mg Oral Daily    warfarin  5 mg Oral Every Tues, Thurs, Sat, Sun    warfarin  7.5 mg Oral Every Mon, Wed, Fri     PRN Meds:ceFAZolin, influenza, sodium chloride 0.9%    Review of patient's allergies indicates:   Allergen Reactions    Lisinopril Anaphylaxis     Objective:     Vital Signs (Most Recent):  Temp: 98.2 °F (36.8 °C) (10/18/19 0803)  Pulse: 61 (10/18/19 0825)  Resp: 16 (10/18/19 0803)  BP: (!) 86/0 (10/18/19 0803)  SpO2: 97 % (10/18/19 0803) Vital Signs (24h Range):  Temp:  [97.7 °F (36.5 °C)-98.6 °F (37 °C)] 98.2 °F (36.8 °C)  Pulse:  [61-88] 61  Resp:  [16-20] 16  SpO2:  [96 %-99 %] 97 %  BP: ()/(0-74) 86/0     Patient Vitals for the past 72 hrs (Last 3 readings):   Weight   10/18/19 0700 118.9 kg (262 lb 2 oz)   10/17/19 1422 118.2 kg (260 lb 9.3 oz)   10/17/19 0701 120.3 kg (265 lb 3.4 oz)     Body mass index is 34.58 kg/m².      Intake/Output Summary (Last 24 hours) at 10/18/2019 1115  Last data filed at 10/18/2019 0845  Gross per 24 hour   Intake 980 ml   Output 1300 ml   Net -320 ml       Hemodynamic Parameters:        Telemetry: No event     Physical Exam   Constitutional: He is oriented to person, place, and time. He appears well-developed.   Neck: No JVD present.   Cardiovascular: Normal rate.   Murmur (VAD) heard.  Pulmonary/Chest: Effort normal and breath sounds normal.   Abdominal: Soft. Bowel sounds are normal.   Musculoskeletal: Normal range of motion. He exhibits no edema.   Neurological: He is alert and oriented to person, place, and time. No cranial nerve deficit.   Skin: Skin is warm and dry.     Significant Labs:  CBC:  Recent Labs   Lab 10/16/19  0205 10/17/19  0400 10/18/19  0922   WBC 5.71 6.41 4.91   RBC 5.21 5.30 5.18   HGB 13.7* 14.0 13.2*   HCT 45.1 45.6 43.3    226 193   MCV 87 86 84   MCH 26.3* 26.4* 25.5*   MCHC 30.4* 30.7* 30.5*     BNP:  Recent Labs   Lab 10/12/19  1254 10/16/19  0205   * 192*     CMP:  Recent Labs   Lab 10/15/19  1812  10/16/19  0205  10/17/19  0400 10/17/19  1323 10/18/19  0922   *   < > 94   < > 101 86 85   CALCIUM 9.6   < > 9.6   < > 9.4 9.3 9.7   ALBUMIN 3.7  --  3.8  --   --   --  3.6   PROT 7.8  --  7.9  --   --   --  7.5      < > 134*   < > 136 135* 135*   K 3.7   < > 3.8   < > 4.0 4.3 3.9   CO2 27   < > 29   < > 25 26 28   CL 97   < > 96   < > 100 99 97   BUN 18   < > 18   < > 23* 21* 23*   CREATININE 1.9*   < > 1.8*   < > 1.8* 1.8* 1.8*   ALKPHOS 96  --  102  --   --   --  95   ALT 26  --  28  --   --   --  33   AST 38  --  29  --   --   --  30   BILITOT 0.5  --  0.5  --   --   --  0.6    < > = values in this interval not displayed.      Coagulation:   Recent Labs   Lab 10/13/19  0121 10/14/19  0226 10/15/19  0300 10/16/19  0205 10/17/19  0400 10/18/19  0922   INR 4.3* 2.7* 2.1* 2.1* 2.2* 2.4*   APTT 47.8* 40.5* 37.3*  --   --   --      LDH:  Recent Labs   Lab 10/16/19  0205 10/17/19  0400 10/18/19  0922   * 314* 244     Microbiology:  Microbiology Results (last 7 days)     ** No results found for the last 168 hours. **          I have  reviewed all pertinent labs within the past 24 hours.    Estimated Creatinine Clearance: 64.8 mL/min (A) (based on SCr of 1.8 mg/dL (H)).    Diagnostic Results:  I have reviewed all pertinent imaging results/findings within the past 24 hours.

## 2019-10-18 NOTE — PLAN OF CARE
Patient remains free of falls or injury. Patient denies pain. Stepped down from ICU on 10/17/2019. Admitted for multiple ICD shocks. 2D echo completed on 10/16/2019; EF 10%. EP c/s completed; plan for DFT this admit. Plan of care reviewed with patient.

## 2019-10-18 NOTE — PROGRESS NOTES
Issued pt new [facility]-[id number]/ pt returned nima [facility]. Will follow up at next visit.

## 2019-10-18 NOTE — PLAN OF CARE
Plan of care discussed with patient. Patient ambulating independently, fall precautions in place. Patient frustrated with having to be stuck for lab draws; MD placed orders to draw labs through PIV. EP study completed. Plan to discharge once returned. Discussed medications and care. Patient has no questions at this time. Will continue to monitor.

## 2019-10-18 NOTE — ASSESSMENT & PLAN NOTE
Tim Richards is a 53 y.o.M with chronic HFrEF (EF 10%) with BiV-ICD implanted in 2014 by Dr. Chiu ([facility] and h/o ICD treated VT in 2018 and LVAD implantation who presented with VT/VF with unsuccessful ICD therapies. Prior to this last event was 10/2018 received DCCV x1. Patient has been on Amiodarone for 1 year 200mg daily. He reports he felt flushed and lightheaded right before his ICD started to fire x5. EP consulted for VF/VT dilan s/p ICD shock.      - Medtronic ICD: MMVT () on 10/12/2019 for total of 11:52 ATP x 5 / DCCV x 6 S/p unsuccessful ATP x3 followed by unsuccessful DCCV x 1 followed unsuccessful ATP burst x2 which degenerated into VF s/p unsuccessful DCCV x 5.   - in the ER, he underwent unsynchronized DCCV 120J for VT/VF in the setting of ADHF, which successfully converted him into AS- rhythm.   - trigger likely ADHF, volume overload, amiodarone causing higher defibrillatory tissue threshold, or change in heart-shock vector post LVAD implant. BIV pacing turned off.   - continue amiodarone PO 200mg daily  - diuresis per HTS. Pt has been switched from IV to PO diuretics 10/17/19.   - TTE 10/16/19 still suggestive of moderately reduced RV function  - will discuss with HTS team for timing of DFT study.   - Monitor electrolytes Mag >2, K >4  - Keep  and defib pads on board

## 2019-10-19 ENCOUNTER — PATIENT MESSAGE (OUTPATIENT)
Dept: TRANSPLANT | Facility: CLINIC | Age: 54
End: 2019-10-19

## 2019-10-19 NOTE — PROGRESS NOTES
PATIENT D/C'D HOME PER MD ORDERS. TELE REMOVED, IV ACCESS REMOVED AND INTACT X2.  VSS, NAD, AND NO COMPLAINTS AT THIS TIME. PATIENT GIVEN AND EXPLAINED MED LIST. ALL LVAD EQUIPMENT INVENTORIED AND CHECKED AT DISCHARGE. PATIENT VERBALIZES COMPLETE UNDERSTANDING OF ALL D/C INSTRUCTIONS AND FOLLOW UP CARE. PATIENT GIVEN PRINTED AVS. PT LEFT UNIT VIA WHEELCHAIR WITH WITH WIFE AND ALL PERSONAL BELONGINGS IN NO APPARENT DISTRESS.

## 2019-10-21 ENCOUNTER — PATIENT MESSAGE (OUTPATIENT)
Dept: TRANSPLANT | Facility: CLINIC | Age: 54
End: 2019-10-21

## 2019-10-23 NOTE — PROGRESS NOTES
D/c 10/18, clinic not informed: Was diuresed with IV Lasix to dry wt and has , renal function at baseline with Scr 1.8. Wt of 262 from 278 on admission. Had symptomatic AIVR during admission and was started transiently on IV Lidocaine. Had no events on VAD. TTE with contrast did not show LV thrombus and continues with mild to moderate decrease in RVSF which may be in relation to his recent ADHF or post VT storm. Discussed with EP and was taken for DFT/NIEPS and device was reprogrammed to successfully deliver therapy and terminate VT (EP report to follow). He will not need further leads and may follow with EP as outpatient. Continue Torsemide 40AM/20PM as per home dose and was started on Aldactone 25 mg for optimization of BP regulation of K .

## 2019-10-24 ENCOUNTER — ANTI-COAG VISIT (OUTPATIENT)
Dept: CARDIOLOGY | Facility: CLINIC | Age: 54
End: 2019-10-24
Payer: COMMERCIAL

## 2019-10-24 DIAGNOSIS — Z79.01 LONG TERM (CURRENT) USE OF ANTICOAGULANTS: ICD-10-CM

## 2019-10-24 DIAGNOSIS — Z95.811 LVAD (LEFT VENTRICULAR ASSIST DEVICE) PRESENT: ICD-10-CM

## 2019-10-24 PROCEDURE — 93793 ANTICOAG MGMT PT WARFARIN: CPT | Mod: S$GLB,,,

## 2019-10-24 PROCEDURE — 93793 PR ANTICOAGULANT MGMT FOR PT TAKING WARFARIN: ICD-10-PCS | Mod: S$GLB,,,

## 2019-10-24 NOTE — PROGRESS NOTES
MA notes reviewed - curious that INR would increase with a decrease in EtOH intake. Will maintain same plan and stress importance of consistency with EtOH abstinence.

## 2019-10-25 ENCOUNTER — HOSPITAL ENCOUNTER (EMERGENCY)
Facility: HOSPITAL | Age: 54
Discharge: HOME | End: 2019-10-25
Attending: EMERGENCY MEDICINE
Payer: COMMERCIAL

## 2019-10-25 VITALS
HEIGHT: 73 IN | OXYGEN SATURATION: 96 % | WEIGHT: 263 LBS | BODY MASS INDEX: 34.85 KG/M2 | HEART RATE: 75 BPM | RESPIRATION RATE: 18 BRPM | TEMPERATURE: 99 F | SYSTOLIC BLOOD PRESSURE: 100 MMHG

## 2019-10-25 DIAGNOSIS — Z45.02 ICD (IMPLANTABLE CARDIOVERTER-DEFIBRILLATOR) DISCHARGE: ICD-10-CM

## 2019-10-25 DIAGNOSIS — I47.29 PVT (PAROXYSMAL VENTRICULAR TACHYCARDIA): ICD-10-CM

## 2019-10-25 DIAGNOSIS — Z95.810 BIVENTRICULAR IMPLANTABLE CARDIOVERTER-DEFIBRILLATOR IN SITU: ICD-10-CM

## 2019-10-25 DIAGNOSIS — Z45.02 AICD DISCHARGE: Primary | ICD-10-CM

## 2019-10-25 DIAGNOSIS — Z45.02 DEFIBRILLATOR DISCHARGE: ICD-10-CM

## 2019-10-25 LAB
ALBUMIN SERPL BCP-MCNC: 4 G/DL (ref 3.5–5.2)
ALP SERPL-CCNC: 121 U/L (ref 55–135)
ALT SERPL W/O P-5'-P-CCNC: 43 U/L (ref 10–44)
ANION GAP SERPL CALC-SCNC: 9 MMOL/L (ref 8–16)
AST SERPL-CCNC: 36 U/L (ref 10–40)
BASOPHILS # BLD AUTO: 0.02 K/UL (ref 0–0.2)
BASOPHILS NFR BLD: 0.5 % (ref 0–1.9)
BILIRUB SERPL-MCNC: 0.4 MG/DL (ref 0.1–1)
BNP SERPL-MCNC: 370 PG/ML (ref 0–99)
BUN SERPL-MCNC: 22 MG/DL (ref 6–20)
CALCIUM SERPL-MCNC: 9.8 MG/DL (ref 8.7–10.5)
CHLORIDE SERPL-SCNC: 106 MMOL/L (ref 95–110)
CO2 SERPL-SCNC: 23 MMOL/L (ref 23–29)
CREAT SERPL-MCNC: 1.9 MG/DL (ref 0.5–1.4)
DIFFERENTIAL METHOD: ABNORMAL
EOSINOPHIL # BLD AUTO: 0.1 K/UL (ref 0–0.5)
EOSINOPHIL NFR BLD: 1.7 % (ref 0–8)
ERYTHROCYTE [DISTWIDTH] IN BLOOD BY AUTOMATED COUNT: 14.3 % (ref 11.5–14.5)
EST. GFR  (AFRICAN AMERICAN): 45.8 ML/MIN/1.73 M^2
EST. GFR  (NON AFRICAN AMERICAN): 39.7 ML/MIN/1.73 M^2
GLUCOSE SERPL-MCNC: 105 MG/DL (ref 70–110)
HCT VFR BLD AUTO: 44.8 % (ref 40–54)
HGB BLD-MCNC: 13.1 G/DL (ref 14–18)
IMM GRANULOCYTES # BLD AUTO: 0.02 K/UL (ref 0–0.04)
IMM GRANULOCYTES NFR BLD AUTO: 0.5 % (ref 0–0.5)
LYMPHOCYTES # BLD AUTO: 0.8 K/UL (ref 1–4.8)
LYMPHOCYTES NFR BLD: 19.5 % (ref 18–48)
MCH RBC QN AUTO: 25.5 PG (ref 27–31)
MCHC RBC AUTO-ENTMCNC: 29.2 G/DL (ref 32–36)
MCV RBC AUTO: 87 FL (ref 82–98)
MONOCYTES # BLD AUTO: 0.6 K/UL (ref 0.3–1)
MONOCYTES NFR BLD: 13.5 % (ref 4–15)
NEUTROPHILS # BLD AUTO: 2.7 K/UL (ref 1.8–7.7)
NEUTROPHILS NFR BLD: 64.3 % (ref 38–73)
NRBC BLD-RTO: 0 /100 WBC
PLATELET # BLD AUTO: 218 K/UL (ref 150–350)
PMV BLD AUTO: 11 FL (ref 9.2–12.9)
POTASSIUM SERPL-SCNC: 4.7 MMOL/L (ref 3.5–5.1)
PROT SERPL-MCNC: 8.3 G/DL (ref 6–8.4)
RBC # BLD AUTO: 5.14 M/UL (ref 4.6–6.2)
SODIUM SERPL-SCNC: 138 MMOL/L (ref 136–145)
TROPONIN I SERPL DL<=0.01 NG/ML-MCNC: 0.06 NG/ML (ref 0–0.03)
WBC # BLD AUTO: 4.21 K/UL (ref 3.9–12.7)

## 2019-10-25 PROCEDURE — 80053 COMPREHEN METABOLIC PANEL: CPT

## 2019-10-25 PROCEDURE — 93010 EKG 12-LEAD: ICD-10-PCS | Mod: ,,, | Performed by: INTERNAL MEDICINE

## 2019-10-25 PROCEDURE — 99285 EMERGENCY DEPT VISIT HI MDM: CPT | Mod: ,,, | Performed by: EMERGENCY MEDICINE

## 2019-10-25 PROCEDURE — 99285 EMERGENCY DEPT VISIT HI MDM: CPT | Mod: 25

## 2019-10-25 PROCEDURE — 93005 ELECTROCARDIOGRAM TRACING: CPT

## 2019-10-25 PROCEDURE — 99285 PR EMERGENCY DEPT VISIT,LEVEL V: ICD-10-PCS | Mod: ,,, | Performed by: EMERGENCY MEDICINE

## 2019-10-25 PROCEDURE — 93010 ELECTROCARDIOGRAM REPORT: CPT | Mod: ,,, | Performed by: INTERNAL MEDICINE

## 2019-10-25 PROCEDURE — 83880 ASSAY OF NATRIURETIC PEPTIDE: CPT

## 2019-10-25 PROCEDURE — 84484 ASSAY OF TROPONIN QUANT: CPT

## 2019-10-25 PROCEDURE — 85025 COMPLETE CBC W/AUTO DIFF WBC: CPT

## 2019-10-25 RX ORDER — AMIODARONE HYDROCHLORIDE 400 MG/1
400 TABLET ORAL DAILY
Qty: 90 TABLET | Refills: 3 | Status: ON HOLD | OUTPATIENT
Start: 2019-10-25 | End: 2020-01-20 | Stop reason: HOSPADM

## 2019-10-25 NOTE — ED NOTES
Pt reports they came down and evaluated it. They said I'm going to be discharged. Spoke to fellow- do not need lactate dehydrogenase.

## 2019-10-25 NOTE — CONSULTS
[facility]  Cardiology  Consult Note    Patient Name: Tim Richards  MRN: [id number]  Admission Date: 10/25/2019  Hospital Length of Stay: 0 days  Code Status: Prior   Attending Provider: Latasha Lucero MD   Consulting Provider: Yohan Rizvi MD  Primary Care Physician: Ja Méndez MD (Inactive)  Principal Problem:<principal problem not specified>    Patient information was obtained from patient, past medical records and ER records.     Consults  Subjective:     Chief Complaint:  ICD shock     HPI:   54 yo AAM with NICM s/p HM 2 LVAD 3/8/18 as DT with repositioning of outflow graft 3/9/18, BiV Medtronic AICD, Hx of VT, GIB 7/2019, HTN, CKDIII presenting after single ICD shock this AM. He reports doing well since recent discharge, occasional palpitations while driving over last week. Weight stable, doing well with diuretics. Awoke from sleep this AM with palpitations and subsequent ICD shock x1. No LOC, felt normal afterwards and presented here.    He was recently admitted with a prolonged VT/VF episode on 10/12, found to be in decompensated HF with weight up ~20 lbs. ICD interrogation showed MMVT , unsuccessful ATP x3, unsuccssful DCCV x1, unsuccessful ATP burst x2, degenerated into VF with DCCV x5 unsuccessful. In ER, he was still found to be in VF and underwent successful unsynchronized external 120V DCCV. He was reloaded with amiodarone and diuresed to his dry weight. After IV amio loading, amiodarone dropped back to previous dose 200 mg daily. He underwent successful DFT with EP prior to discharge and minor device settings changes were made (see procedure note); unsuccessful shocks suspected due to increased threshold in setting of ADHF, amiodarone increasing defibrillatory tissue threshold or change in heart shock vector post LVAD implant. BiV pacing turned off, as was ATP for his VT zone as well.    In terms of prior VT history- he had the presentation noted  above on 10/12/2019. Previously with one episode of treated VT (10/2018) post LVAD implant in March 2018. Per chart review, he had ICD implanted for secondary prevention for VT 10/31/2014 and previously had multiple episodes of device treated VT (7/2017, 12/2017) prior to LVAD.    ICD interrogation today:  1 episode 10/25/2019 04:27, duration 19s. MMVT ( ms) precipitated by PVC, successful 36J shock, return to AP-.     Mode: DDD  Rate 50 - 120 BPM, Upper track 120BPM  V. Pacing RV, Nonadaptive CRT  Total AS- 16.5%, AS-VS 83.1%  AT/AF: Monitor >171 BPM, therapies off  VF: >231BPM, 35J x6    - 231 BPM, 35J x 6  Optivol stable  Battery: estimated 5 months.      Past Medical History:   Diagnosis Date    CHF (congestive heart failure)     Dilated cardiomyopathy 1/10/2018    Disorder of kidney and ureter     CKD    Gout     HTN (hypertension)     Ventricular tachycardia (paroxysmal)        Past Surgical History:   Procedure Laterality Date    CARDIAC CATHETERIZATION  Dec. 2012    CARDIAC DEFIBRILLATOR PLACEMENT Left     CRRT-D    COLONOSCOPY N/A 3/6/2018    Procedure: COLONOSCOPY;  Surgeon: Alonso Bone MD;  Location: [facility] (2ND FLR);  Service: Endoscopy;  Laterality: N/A;    COLONOSCOPY N/A 7/17/2019    Procedure: COLONOSCOPY;  Surgeon: Blane Valdez MD;  Location: [facility] ENDO (2ND FLR);  Service: Endoscopy;  Laterality: N/A;    COLONOSCOPY N/A 7/18/2019    Procedure: COLONOSCOPY;  Surgeon: Blane Valdez MD;  Location: [facility] ENDO (2ND FLR);  Service: Endoscopy;  Laterality: N/A;    ESOPHAGOGASTRODUODENOSCOPY N/A 7/17/2019    Procedure: EGD (ESOPHAGOGASTRODUODENOSCOPY);  Surgeon: Blane Valdez MD;  Location: [facility] ENDO (2ND FLR);  Service: Endoscopy;  Laterality: N/A;    ESOPHAGOGASTRODUODENOSCOPY N/A 7/18/2019    Procedure: EGD (ESOPHAGOGASTRODUODENOSCOPY);  Surgeon: Blane Valdez MD;  Location: [facility] ENDO (2ND FLR);  Service: Endoscopy;  Laterality: N/A;    NONINVASIVE CARDIAC ELECTROPHYSIOLOGY STUDY  N/A 10/18/2019    Procedure: CARDIAC ELECTROPHYSIOLOGY STUDY, NONINVASIVE;  Surgeon: Raz Wagner MD;  Location: [facility] EP LAB;  Service: Cardiology;  Laterality: N/A;  VT, DFTs, MDT CRTD in situ, LVAD, juarez, MB, 3092    TREATMENT OF CARDIAC ARRHYTHMIA  10/18/2019    Procedure: Cardioversion or Defibrillation;  Surgeon: Raz Wagner MD;  Location: [facility] EP LAB;  Service: Cardiology;;       Review of patient's allergies indicates:   Allergen Reactions    Lisinopril Anaphylaxis       No current facility-administered medications on file prior to encounter.      Current Outpatient Medications on File Prior to Encounter   Medication Sig    allopurinol (ZYLOPRIM) 100 MG tablet TAKE 1 TABLET BY MOUTH EVERY DAY    amLODIPine (NORVASC) 10 MG tablet Take 1 tablet (10 mg total) by mouth once daily.    doxazosin (CARDURA) 8 MG Tab Take 1 tablet (8 mg total) by mouth every 12 (twelve) hours.    ferrous gluconate (FERGON) 324 MG tablet Take 1 tablet (324 mg total) by mouth daily with breakfast.    magnesium oxide (MAG-OX) 400 mg (241.3 mg magnesium) tablet TAKE 1 TABLET (400 MG TOTAL) BY MOUTH 3 (THREE) TIMES DAILY.    mesalamine (PENTASA) 250 mg CpSR Take 4 capsules (1,000 mg total) by mouth 4 (four) times daily.    pantoprazole (PROTONIX) 40 MG tablet TAKE 1 TABLET (40 MG TOTAL) BY MOUTH ONCE DAILY.    potassium chloride (K-TAB) 20 mEq Take 1 tablet (20 mEq total) by mouth 2 (two) times daily.    spironolactone (ALDACTONE) 25 MG tablet Take 1 tablet (25 mg total) by mouth once daily.    torsemide (DEMADEX) 20 MG Tab Take 40 mg (2 tabs) qam and 20 mg (1 tab) qpm for 3 days then 40 mg (2 tabs) daily.    warfarin (COUMADIN) 5 MG tablet Take 1.5 tabs by mouth on 7/23 only, followed by weekly dose of 1 tablet daily, except 1.5 tabs on Mon, Wed, Fri    [DISCONTINUED] amiodarone (PACERONE) 200 MG Tab TAKE 1 TABLET (200 MG TOTAL) BY MOUTH ONCE DAILY.    sildenafil (VIAGRA) 100 MG tablet Take 1 tablet (100 mg  total) by mouth daily as needed for Erectile Dysfunction.     Family History     Problem Relation (Age of Onset)    Cancer Sister (54)    Coronary artery disease Father    Diabetes Father    Heart disease Father    Hypertension Father    No Known Problems Mother, Brother        Tobacco Use    Smoking status: Former Smoker     Packs/day: 1.00     Years: 31.00     Pack years: 31.00     Types: Cigarettes     Last attempt to quit: 2018     Years since quittin.7    Smokeless tobacco: Never Used    Tobacco comment: pt is quiting on his own - pt stated not qualified for program;  pt  quit on his own   Substance and Sexual Activity    Alcohol use: No     Alcohol/week: 0.0 standard drinks     Comment: one fifth of gin or rum/week    Drug use: No    Sexual activity: Yes     Partners: Female     Birth control/protection: None     Comment: 10/5/17  with same partner 7 years      Review of Systems   All other systems reviewed and are negative.    Objective:     Vital Signs (Most Recent):  Temp: 98.8 °F (37.1 °C) (10/25/19 0712)  Pulse: 76 (10/25/19 0729)  Resp: 16 (10/25/19 0712)  BP: (unable to obtain in triage-LVAD) (10/25/19 0712)  SpO2: 100 % (10/25/19 0712) Vital Signs (24h Range):  Temp:  [98.8 °F (37.1 °C)] 98.8 °F (37.1 °C)  Pulse:  [74-76] 76  Resp:  [16] 16  SpO2:  [100 %] 100 %     Weight: 119.3 kg (263 lb)  Body mass index is 34.7 kg/m².    SpO2: 100 %  O2 Device (Oxygen Therapy): room air      Intake/Output Summary (Last 24 hours) at 10/25/2019 1017  Last data filed at 10/25/2019 0922  Gross per 24 hour   Intake --   Output 500 ml   Net -500 ml       Lines/Drains/Airways     Line                 VAD 18 1124 Left ventricular assist device HeartMate  days          Peripheral Intravenous Line                 Peripheral IV - Single Lumen 10/25/19 0734 18 G Right Forearm less than 1 day                Physical Exam   Constitutional: He is oriented to person, place, and time. No  distress.   HENT:   Head: Atraumatic.   Mouth/Throat: No oropharyngeal exudate.   Eyes: EOM are normal. No scleral icterus.   Neck: Neck supple. No JVD present.   Cardiovascular: Normal rate, regular rhythm and normal heart sounds. Exam reveals no gallop and no friction rub.   No murmur heard.  Pulmonary/Chest: Effort normal and breath sounds normal. No respiratory distress. He has no wheezes. He has no rales. He exhibits no tenderness.   Abdominal: Soft. Bowel sounds are normal. He exhibits no distension. There is no tenderness.   Musculoskeletal: He exhibits no edema.   Neurological: He is alert and oriented to person, place, and time. No cranial nerve deficit.   Skin: Skin is warm and dry. No erythema.   Psychiatric: He has a normal mood and affect.       Significant Labs:   BMP:   Recent Labs   Lab 10/25/19  0731         K 4.7      CO2 23   BUN 22*   CREATININE 1.9*   CALCIUM 9.8   , CMP   Recent Labs   Lab 10/25/19  0731      K 4.7      CO2 23      BUN 22*   CREATININE 1.9*   CALCIUM 9.8   PROT 8.3   ALBUMIN 4.0   BILITOT 0.4   ALKPHOS 121   AST 36   ALT 43   ANIONGAP 9   ESTGFRAFRICA 45.8*   EGFRNONAA 39.7*   , CBC   Recent Labs   Lab 10/25/19  0731   WBC 4.21   HGB 13.1*   HCT 44.8      , INR   Recent Labs   Lab 10/24/19  1220   INR 4.0*   , Lipid Panel No results for input(s): CHOL, HDL, LDLCALC, TRIG, CHOLHDL in the last 48 hours. and Troponin   Recent Labs   Lab 10/25/19  0731   TROPONINI 0.056*       Significant Imaging: Echocardiogram:   2D echo with color flow doppler:   Results for orders placed or performed during the hospital encounter of 07/18/18   2D Echo w/ Color Flow Doppler   Result Value Ref Range    QEF 15 (A) 55 - 65    Mitral Valve Regurgitation SEVERE (A)     Est. PA Systolic Pressure 33.6     Mitral Valve Mobility NORMAL     Tricuspid Valve Regurgitation MILD     Narrative    Date of Procedure: 07/18/2018        TEST DESCRIPTION   Technical  Quality: This is a technically challenging study. There is poor endocardial definition.     General: A catheter is present in the right-sided cardiac chambers.     Aorta: The aortic root is normal in size, measuring 2.1 cm at sinotubular junction and 3.4 cm at Sinuses of Valsalva. The proximal ascending aorta is normal in size, measuring 3.6 cm across.     Left Atrium: The left atrial volume index is severely enlarged, measuring 73.67 cc/m2.     Left Ventricle: The left ventricle is severely enlarged, with an end-diastolic diameter of 8.3 cm, and an end-systolic diameter of 7.7 cm. LV wall thickness is normal, with the septum and the posterior wall each measuring 0.8 cm across. Relative wall   thickness was normal at 0.19, and the LV mass index was increased at 172.0 g/m2 consistent with eccentric left ventricular hypertrophy. There is global hypokinesis. Left ventricular systolic function appears severely depressed. Visually estimated   ejection fraction is 15-20%.         Right Atrium: The right atrium is enlarged, measuring 6.8 cm in length and 5.1 cm in width in the apical view.     Right Ventricle: The right ventricle is normal in size measuring 3.4 cm at the base in the apical right ventricle-focused view. Global right ventricular systolic function appears moderately depressed. There is abnormal septal motion. Tricuspid annular   plane systolic excursion (TAPSE) is 1.2 cm. The estimated PA systolic pressure is 34 mmHg.     Aortic Valve:  The aortic valve is normal in structure.     Mitral Valve:  The mitral valve is normal in structure with normal leaflet mobility. There is severe mitral regurgitation.     Tricuspid Valve:  The tricuspid valve is normal in structure with normal leaflet mobility. There is mild tricuspid regurgitation.     Pulmonary Valve:  The pulmonic valve is normal in structure with normal leaflet mobility. There is trivial to mild pulmonic regurgitation.     IVC: IVC is enlarged but  collapses > 50% with a sniff, suggesting intermediate right atrial pressure of 8 mmHg.     Intracavitary: There is no evidence of pericardial effusion, intracavity mass, thrombi, or vegetation.     Other: There is a left pleural effusion present.       LVAD: There is a HeartMate II LVAD in place. Inflow cannula is visualized. Outflow graft is not visualized. Baseline speed is 9400 rpms. The aortic valve opens intermittently. Septum is midline.       CONCLUSIONS     1 - Severely depressed left ventricular systolic function (EF 15-20%).     2 - Eccentric hypertrophy.     3 - Biatrial enlargement.     4 - Moderately depressed right ventricular systolic function .     5 - The estimated PA systolic pressure is 34 mmHg.     6 - Severe mitral regurgitation.     7 - Mild tricuspid regurgitation.     8 - Trivial to mild pulmonic regurgitation.     9 - Intermediate central venous pressure.     10 - Left pleural effusion.     11 - HeartMate II LVAD; speed 9400.             This document has been electronically    SIGNED BY: Magi Delong MD On: 07/18/2018 17:10    and Transthoracic echo (TTE) complete (Cupid Only):   Results for orders placed or performed during the hospital encounter of 10/12/19   Echo Color Flow Doppler? Yes; Bubble Contrast? Yes   Result Value Ref Range    IVS 0.86 0.6 - 1.1 cm    LA size 3.86 cm    Left Atrium Major Axis 6.22 cm    Left Atrium Minor Axis 6.29 cm    LVIDD 9.00 (A) 3.5 - 6.0 cm    LVIDS 8.72 (A) 2.1 - 4.0 cm    LVOT diameter 2.16 cm    PW 1.05 0.6 - 1.1 cm    MV Peak A Jorge 0.91 m/s    E wave decelartion time 230.26 msec    MV Peak E Jorge 0.70 m/s    RA Major Axis 5.82 cm    RA Width 4.92 cm    Sinus 3.45 cm    TAPSE 1.60 cm    TR Max Jorge 2.57 m/s    TDI LATERAL 0.02 m/s    LA WIDTH 3.65 cm    LV Diastolic Volume 441.12 mL    LV Systolic Volume 417.56 mL    LV LATERAL E/E' RATIO 35.00 m/s    FS 3 %    LA volume 74.91 cm3    LV mass 474.50 g    Left Ventricle Relative Wall Thickness 0.23 cm     E/A ratio 0.77     LVOT area 3.7 cm2    LV Systolic Volume Index 173.6 mL/m2    LV Diastolic Volume Index 183.37 mL/m2    LA Volume Index 31.1 mL/m2    LV Mass Index 197 g/m2    Triscuspid Valve Regurgitation Peak Gradient 26 mmHg    BSA 2.46 m2    Right Atrial Pressure (from IVC) 8 mmHg    TV rest pulmonary artery pressure 34 mmHg    RVDD 4.90 cm    Narrative    · Eccentric left ventricular hypertrophy. Severely decreased left   ventricular systolic function. The estimated ejection fraction is 10%  · Grade I (mild) left ventricular diastolic dysfunction consistent with   impaired relaxation.  · Severe left ventricular enlargement. LVEDD 9 cm  · LVAD present. [place] 377[address] RPMs. The pump type is a Heartmate II.   Unable to see if AV opens. The septum is midline  · Mild-to-moderate mitral regurgitation.  · Mild tricuspid regurgitation.  · Intermediate central venous pressure (8 mm Hg).  · The estimated PA systolic pressure is 34 mm Hg        Assessment and Plan:     Defibrillator discharge  53M nonischemic cardiomyopathy s/p HMII 3/8/2018 as DT, hx of VT, Medtronic CRT-D implanted 2014 for secondary prevention, recent VT/VF in setting of ADHF with unsuccessful shocks, presenting with MMVT s/p successful ICD shock x1. Recent admission with failed shocks likely due to decompensated HF s/p successful DFT and recent device adjustments (BiV pacing off, no ATP for VT zone). Treated successfully. Well compensated from HF standpoint. Will increase amiodarone and have him follow up in EP clinic (not currently established).    -Increase amiodarone from 200 daily to 400 daily  -Follow up in EP clinic in ~6 weeks    Discussed with EP staff Dr. Bell.      Thank you for your consult. I will sign off. Please contact us if you have any additional questions.    Yohan Rizvi MD  Cardiology   [facility]

## 2019-10-25 NOTE — ED NOTES
Pt resting comfortably on cardiac, bp, and o2 monitor. RR 18 even and unlabored. PT aware of plan of care.

## 2019-10-25 NOTE — ED PROVIDER NOTES
Encounter Date: 10/25/2019       History     Chief Complaint   Patient presents with    AICD fired     LVAD patient     Mr. Richards is a 53-year-old man with a nonischemic cardiomyopathy status post LVAD who presents after his AICD fired once today.  He was admitted here October 12th after his AICD fired 6 times.  He was found to be volume overloaded.  He was diuresed and the pacemaker function was turned off.  At 5:30 a.m. he awoke because his heart felt fluttering and racing.  His AICD then fired.  Over the past week or so since he has been discharged, he has felt episodes of his heart fluttering associated with wooziness and lightheadedness.  He complains of some shortness of breath with exertion but states this is at his baseline.  He denies any chest pain.        Review of patient's allergies indicates:   Allergen Reactions    Lisinopril Anaphylaxis     Past Medical History:   Diagnosis Date    CHF (congestive heart failure)     Dilated cardiomyopathy 1/10/2018    Disorder of kidney and ureter     CKD    Gout     HTN (hypertension)     Ventricular tachycardia (paroxysmal)      Past Surgical History:   Procedure Laterality Date    CARDIAC CATHETERIZATION  Dec. 2012    CARDIAC DEFIBRILLATOR PLACEMENT Left     CRRT-D    COLONOSCOPY N/A 3/6/2018    Procedure: COLONOSCOPY;  Surgeon: Alonso Bone MD;  Location: [facility] ([address] [place] [address]);  Service: Endoscopy;  Laterality: N/A;    COLONOSCOPY N/A 7/17/2019    Procedure: COLONOSCOPY;  Surgeon: Blane Valdez MD;  Location: [facility] ([address] [place] [address]);  Service: Endoscopy;  Laterality: N/A;    COLONOSCOPY N/A 7/18/2019    Procedure: COLONOSCOPY;  Surgeon: Blane Valdez MD;  Location: [facility] ([address] [place] [address]);  Service: Endoscopy;  Laterality: N/A;    ESOPHAGOGASTRODUODENOSCOPY N/A 7/17/2019    Procedure: EGD (ESOPHAGOGASTRODUODENOSCOPY);  Surgeon: Blane Valdez MD;  Location: [facility] ([address] [place] [address]);  Service: Endoscopy;  Laterality: N/A;    ESOPHAGOGASTRODUODENOSCOPY N/A 7/18/2019     Procedure: EGD (ESOPHAGOGASTRODUODENOSCOPY);  Surgeon: Blane Valdez MD;  Location: [facility] ENDO ([address] [place] [address]);  Service: Endoscopy;  Laterality: N/A;    NONINVASIVE CARDIAC ELECTROPHYSIOLOGY STUDY N/A 10/18/2019    Procedure: CARDIAC ELECTROPHYSIOLOGY STUDY, NONINVASIVE;  Surgeon: Raz Wagner MD;  Location: [facility] EP LAB;  Service: Cardiology;  Laterality: N/A;  VT, DFTs, MDT CRTD in situ, LVAD, anes, MB, 3098    TREATMENT OF CARDIAC ARRHYTHMIA  10/18/2019    Procedure: Cardioversion or Defibrillation;  Surgeon: Raz Wagner MD;  Location: [facility] EP LAB;  Service: Cardiology;;     Family History   Problem Relation Age of Onset    Hypertension Father     Diabetes Father     Coronary artery disease Father     Heart disease Father         CHF    No Known Problems Mother     Cancer Sister 54        breast CA    No Known Problems Brother      Social History     Tobacco Use    Smoking status: Former Smoker     Packs/day: 1.00     Years: 31.00     Pack years: 31.00     Types: Cigarettes     Last attempt to quit: 2018     Years since quittin.7    Smokeless tobacco: Never Used    Tobacco comment: pt is quiting on his own - pt stated not qualified for program;  pt  quit on his own   Substance Use Topics    Alcohol use: No     Alcohol/week: 0.0 standard drinks     Comment: one fifth of gin or rum/week    Drug use: No     Review of Systems   Constitutional: Negative for appetite change and fever.   HENT: Negative for congestion.         No recent URI symptoms.   Eyes: Negative for visual disturbance.   Respiratory: Positive for shortness of breath. Negative for cough.    Cardiovascular: Negative for chest pain.   Gastrointestinal: Negative for abdominal pain, nausea and vomiting.        No changes in bowel habits recently.   Genitourinary:        No changes in urination.   Musculoskeletal: Negative for back pain.   Skin: Negative for rash.   Neurological: Negative for weakness and numbness.        Physical Exam     Initial Vitals   BP Pulse Resp Temp SpO2   10/25/19 0802 10/25/19 0712 10/25/19 0712 10/25/19 0712 10/25/19 0712   110/86 74 16 98.8 °F (37.1 °C) 100 %      MAP       --                Physical Exam    Nursing note and vitals reviewed.  Constitutional: He appears well-developed and well-nourished.   HENT:   Head: Normocephalic and atraumatic.   Right Ear: External ear normal.   Left Ear: External ear normal.   Mouth/Throat: Oropharynx is clear and moist. No oropharyngeal exudate.   Eyes: EOM are normal. Pupils are equal, round, and reactive to light.   Neck: Neck supple. No JVD present.   Cardiovascular:   Low pitched mechanical whirring consistent with LVAD.   Pulmonary/Chest: Breath sounds normal. No respiratory distress. He has no wheezes. He has no rhonchi. He has no rales.   Abdominal: Soft. Bowel sounds are normal. There is no tenderness.   Dressing in place over LVAD line in the right lower quadrant.   Musculoskeletal: Normal range of motion. He exhibits edema ( 1+ bilateral pitting).   Neurological: He is alert and oriented to person, place, and time. He has normal strength.   Skin: Skin is warm and dry. Capillary refill takes less than 2 seconds. No rash noted.   Psychiatric: He has a normal mood and affect.         ED Course   Procedures  Labs Reviewed   CBC W/ AUTO DIFFERENTIAL - Abnormal; Notable for the following components:       Result Value    Hemoglobin 13.1 (*)     Mean Corpuscular Hemoglobin 25.5 (*)     Mean Corpuscular Hemoglobin Conc 29.2 (*)     Lymph # 0.8 (*)     All other components within normal limits   COMPREHENSIVE METABOLIC PANEL - Abnormal; Notable for the following components:    BUN, Bld 22 (*)     Creatinine 1.9 (*)     eGFR if  45.8 (*)     eGFR if non  39.7 (*)     All other components within normal limits   TROPONIN I - Abnormal; Notable for the following components:    Troponin I 0.056 (*)     All other components within  normal limits   B-TYPE NATRIURETIC PEPTIDE - Abnormal; Notable for the following components:     (*)     All other components within normal limits   LACTATE DEHYDROGENASE        ECG Results          EKG 12-lead (Final result)  Result time 10/25/19 11:02:19    Final result by Interface, Lab In [facility] [place] (10/25/19 11:02:19)                 Narrative:    Test Reason : Z45.02,    Vent. Rate : 078 BPM     Atrial Rate : 078 BPM     P-R Int : 222 ms          QRS Dur : 156 ms      QT Int : 452 ms       P-R-T Axes : 012 116 -86 degrees     QTc Int : 515 ms    Sinus rhythm with 1st degree A-V block  Left bundle branch block  Abnormal ECG  When compared with ECG of 12-OCT-2019 14:21,  Sinus rhythm has replaced Ventricular fibrillation  Vent. rate has decreased  BPM  Confirmed by MANN MITTAL MD ([id number]) on 10/25/2019 11:02:05 AM    Referred By: [email or web address]   SELF           Confirmed By:MANN MITTAL MD                            Imaging Results          X-Ray Chest AP Portable (Final result)  Result time 10/25/19 07:59:31    Final result by Rian Duncan III, MD (10/25/19 07:59:31)                 Narrative:    EXAMINATION:  XR CHEST AP PORTABLE    CLINICAL HISTORY:  Chest Pain;    FINDINGS:  There is a pacer and LVAD.  There is postoperative change, cardiomegaly mild CHF and no change.      Electronically signed by: Rian Duncan MD  Date:    10/25/2019  Time:    07:59                               Medical Decision Making:   History:   Old Medical Records: I decided to obtain old medical records.  Old Records Summarized: other records.       <> Summary of Records: Recently admitted for AICD firing  Initial Assessment:   53-year-old man complains of AICD firing.  He was recently admitted for the same.  On his last admission he was also treated for volume overload.  Differential Diagnosis:   Includes but is not limited to V-tach, VFib, other cardiac dysrhythmia, electrolyte abnormality, CHF exacerbation  ED  Management:  Cardiac monitoring, will check electrolytes, chest x-ray.  Plan to discuss with Cardiology.              Attending Attestation:             Attending ED Notes:   9:34 AM  Cardiology has evaluated this patient in the ED.  On interrogation they see this was inappropriate firing for an episode of V-tach.  They have discussed the case with both HTS and BP.  They feel the patient is safe for discharge. They are recommending increasing his amiodarone from 200 mg to 400 mg.  Patient will be closely followed as an outpatient.             Clinical Impression:       ICD-10-CM ICD-9-CM   1. AICD discharge Z45.02 V71.89   2. Defibrillator discharge Z45.02 V71.89   3. Biventricular implantable cardioverter-defibrillator in situ Z95.810 V45.02   4. PVT (paroxysmal ventricular tachycardia) I47.2 427.1   5. ICD (implantable cardioverter-defibrillator) discharge Z45.02 V71.89                                Latasha Lucero MD  10/25/19 [id number]

## 2019-10-25 NOTE — ASSESSMENT & PLAN NOTE
53M with HMII 3/8/2018 as DT, hx of VT, recent VT/VF in setting of ADHF, presenting with MMVT s/p ICD shock x1. Appears euvolemic (previous trigger for VT suspected to be ADHF), weight stable. Otherwise doing well.    -Stable for discharge home  -Continue current medications, change as below  -Increase amiodarone to 400 daily from 200 daily  -Will have him keep HTS follow up and set up EP follow up in about 6 weeks for consideration of further therapy, device adjustments, consideration of VT ablation if recurrent    Discussed with HTS staff Dr. Hadley and EP staff Dr. Bell.

## 2019-10-25 NOTE — ASSESSMENT & PLAN NOTE
53M nonischemic cardiomyopathy s/p HMII 3/8/2018 as DT, hx of VT, [employer] CRT-D implanted 2014 for secondary prevention, recent VT/VF in setting of ADHF with unsuccessful shocks, presenting with MMVT s/p successful ICD shock x1. Recent admission with failed shocks likely due to decompensated HF s/p successful DFT and recent device adjustments (BiV pacing off, no ATP for VT zone). Treated successfully. Well compensated from HF standpoint. Will increase amiodarone and have him follow up in EP clinic (not currently established).    -Increase amiodarone from 200 daily to 400 daily  -Follow up in EP clinic in ~6 weeks    Discussed with EP staff Dr. Bell.

## 2019-10-25 NOTE — PROCEDURES
PRE-TEST DATA   DEVICES:  [employer] Viva Quad XT CRT-D [id number]  10/31/2014- current     TEST DESCRIPTION   Follow-Up Analysis:  Chamber type: CRT-D  Mode: DDD    Lower limit rate is 50 bpm  Upper tracking rate is 120 bpm  Max sensor rate is 120 bpm    Paced AV delay/Sensed AV delay: 300/300 ms    Estimated longevity: 5 months    Auto mode switch: 171 bpm  Nonsustained VT: none    LEADS:  RA Lead       P-wave: 1.3 mV       Impedance: 399 Ohms       Threshold: 0.875 V @ 0.40 ms        Paced: 0.1%    RV Lead       R-wave: 4.8 mV       Threshold: 0.625 V @ 0.40 ms       Impedance: 418 Ohms       Paced: 16.5%    LV Lead       Impedance: 399 Ohms    Pacing:  AS-VS 83.1%, AS- 16.5%, AP-VS 0.1%, AP- 0.2%    Detection/Therapies:  AT/AF Monitor, >171 bpm, All Rx Off  VF >231 bpm, 35J x6  -231 bpm, 35J x 6  FVT Off    The patient had 1 treated episodes.  Episodes:  MMVT  ms, treated successfully with 36J shock x1.     Reprogramming Comments:  None    Discussed with Dr. Bell.    Yohan Rizvi MD  Cardiology Fellow, PGY6  [phone]

## 2019-10-25 NOTE — CONSULTS
[facility]  Cardiology  Consult Note    Patient Name: Tim Richards  MRN: [id number]  Admission Date: 10/25/2019  Hospital Length of Stay: 0 days  Code Status: Prior   Attending Provider: Latasha Lucero MD   Consulting Provider: Yohan Rizvi MD  Primary Care Physician: Ja Méndez MD (Inactive)  Principal Problem:<principal problem not specified>    Patient information was obtained from patient, past medical records and ER records.     Inpatient consult to Cardiology  Consult performed by: Yohan Rizvi MD  Consult ordered by: Latasha Lucero MD        Subjective:     Chief Complaint:  ICD shock     HPI:   54 yo AAM with s/p HM 2 at [address], 3/8/18 as DT with repositioning of outflow graft 3/9/18, BiV Med[employer]onic AICD, Hx of VT, GIB 7/2019, HTN, CKDIII presenting after single ICD shock this AM. He reports doing well since recent discharge, occasional palpitations while driving over last week. Weight stable, doing well with diuretics. Awoke from sleep this AM with palpitations and subsequent ICD shock x1. No LOC, felt normal afterwards and presented here.    He was recently admitted with a prolonged VT/VF episode on 10/12, found to be in decompensated HF with weight up ~20 lbs. ICD interrogation showed MMVT , unsuccessful ATP x3, unsuccssful DCCV x1, unsuccessful ATP burst x2, degenerated into VF with DCCV x5 unsuccessful. In ER, he was still found to be in VF and underwent successful unsynchronized external 120V DCCV. He was reloaded with amiodarone and diuresed to his dry weight (278 lbs to 262 lbs). [place] increased [place] [address]. He had symptomatic AIVR during admission, briefly on IV lidocaine. After IV amio loading, amio dropped back to previous dose 200 mg daily. He underwent successful DFT with EP prior to discharge and minor device settings changes were made (see procedure note); unsuccessful shocks suspected due to increased threshold in setting of ADHF,  amiodarone increasing defibrillatory tissue threshold or change in heart shock vector post LVAD implant. BiV pacing turned off as well.    Labs here CBC, CMP stable. BNP slightly elevated 370 from 128 on discharge (870 on last admit). CXR clear. Denies CP, palpitations, change in baseline COLEMAN, PND, orthopnea, PILI.     ICD interrogation today:  1 episode 10/25/2019 04:27, duration 19s. MMVT ( ms), successful 36J shock, return to AP-.   See procedure note for full interrogation.    Past Medical History:   Diagnosis Date    CHF (congestive heart failure)     Dilated cardiomyopathy 1/10/2018    Disorder of kidney and ureter     CKD    Gout     HTN (hypertension)     Ventricular tachycardia (paroxysmal)        Past Surgical History:   Procedure Laterality Date    CARDIAC CATHETERIZATION  Dec. 2012    CARDIAC DEFIBRILLATOR PLACEMENT Left     CRRT-D    COLONOSCOPY N/A 3/6/2018    Procedure: COLONOSCOPY;  Surgeon: Alonso Bone MD;  Location: [address]);  Service: Endoscopy;  Laterality: N/A;    COLONOSCOPY N/A 7/17/2019    Procedure: COLONOSCOPY;  Surgeon: Blane Valdez MD;  Location: [facility] ([address] [place] [address]);  Service: Endoscopy;  Laterality: N/A;    COLONOSCOPY N/A 7/18/2019    Procedure: COLONOSCOPY;  Surgeon: Blane Valdez MD;  Location: [address]);  Service: Endoscopy;  Laterality: N/A;    ESOPHAGOGASTRODUODENOSCOPY N/A 7/17/2019    Procedure: EGD (ESOPHAGOGASTRODUODENOSCOPY);  Surgeon: Blane Valdez MD;  Location: [address]);  Service: Endoscopy;  Laterality: N/A;    ESOPHAGOGASTRODUODENOSCOPY N/A 7/18/2019    Procedure: EGD (ESOPHAGOGASTRODUODENOSCOPY);  Surgeon: Blane Valdez MD;  Location: [facility] ([address] [place] [address]);  Service: Endoscopy;  Laterality: N/A;    NONINVASIVE CARDIAC ELECTROPHYSIOLOGY STUDY N/A 10/18/2019    Procedure: CARDIAC ELECTROPHYSIOLOGY STUDY, NONINVASIVE;  Surgeon: Raz Wagner MD;  Location: [facility] EP LAB;  Service: Cardiology;  Laterality: N/A;  VT, DFTs, MDT  CRTD in situ, LVAD, LASHELL pizarro, 1020    TREATMENT OF CARDIAC ARRHYTHMIA  10/18/2019    Procedure: Cardioversion or Defibrillation;  Surgeon: Raz Wagner MD;  Location: [facility];  Service: Cardiology;;       Review of patient's allergies indicates:   Allergen Reactions    Lisinopril Anaphylaxis       No current facility-administered medications on file prior to encounter.      Current Outpatient Medications on File Prior to Encounter   Medication Sig    allopurinol (ZYLOPRIM) 100 MG tablet TAKE 1 TABLET BY MOUTH EVERY DAY    amLODIPine (NORVASC) 10 MG tablet Take 1 tablet (10 mg total) by mouth once daily.    doxazosin (CARDURA) 8 MG Tab Take 1 tablet (8 mg total) by mouth every 12 (twelve) hours.    ferrous gluconate (FERGON) 324 MG tablet Take 1 tablet (324 mg total) by mouth daily with breakfast.    magnesium oxide (MAG-OX) 400 mg (241.3 mg magnesium) tablet TAKE 1 TABLET (400 MG TOTAL) BY MOUTH 3 (THREE) TIMES DAILY.    mesalamine (PENTASA) 250 mg CpSR Take 4 capsules (1,000 mg total) by mouth 4 (four) times daily.    pantoprazole (PROTONIX) 40 MG tablet TAKE 1 TABLET (40 MG TOTAL) BY MOUTH ONCE DAILY.    potassium chloride (K-TAB) 20 mEq Take 1 tablet (20 mEq total) by mouth 2 (two) times daily.    spironolactone (ALDACTONE) 25 MG tablet Take 1 tablet (25 mg total) by mouth once daily.    torsemide (DEMADEX) 20 MG Tab Take 40 mg (2 tabs) qam and 20 mg (1 tab) qpm for 3 days then 40 mg (2 tabs) daily.    warfarin (COUMADIN) 5 MG tablet Take 1.5 tabs by mouth on 7/23 only, followed by weekly dose of 1 tablet daily, except 1.5 tabs on Mon, Wed, Fri    [DISCONTINUED] amiodarone (PACERONE) 200 MG Tab TAKE 1 TABLET (200 MG TOTAL) BY MOUTH ONCE DAILY.    sildenafil (VIAGRA) 100 MG tablet Take 1 tablet (100 mg total) by mouth daily as needed for Erectile Dysfunction.     Family History     Problem Relation (Age of Onset)    Cancer Sister (54)    Coronary artery disease Father    Diabetes Father     Heart disease Father    Hypertension Father    No Known Problems Mother, Brother        Tobacco Use    Smoking status: Former Smoker     Packs/day: 1.00     Years: 31.00     Pack years: 31.00     Types: Cigarettes     Last attempt to quit: 2018     Years since quittin.7    Smokeless tobacco: Never Used    Tobacco comment: pt is quiting on his own - pt stated not qualified for program;  pt  quit on his own   Substance and Sexual Activity    Alcohol use: No     Alcohol/week: 0.0 standard drinks     Comment: one fifth of gin or rum/week    Drug use: No    Sexual activity: Yes     Partners: Female     Birth control/protection: None     Comment: 10/5/17  with same partner 7 years      Review of Systems   All other systems reviewed and are negative.    Objective:     Vital Signs (Most Recent):  Temp: 98.8 °F (37.1 °C) (10/25/19 0712)  Pulse: 76 (10/25/19 0729)  Resp: 16 (10/25/19 0712)  BP: (unable to obtain in triage-LVAD) (10/25/19 0712)  SpO2: 100 % (10/25/19 0712) Vital Signs (24h Range):  Temp:  [98.8 °F (37.1 °C)] 98.8 °F (37.1 °C)  Pulse:  [74-76] 76  Resp:  [16] 16  SpO2:  [100 %] 100 %     Weight: 119.3 kg (263 lb)  Body mass index is 34.7 kg/m².    SpO2: 100 %  O2 Device (Oxygen Therapy): room air      Intake/Output Summary (Last 24 hours) at 10/25/2019 0954  Last data filed at 10/25/2019 0922  Gross per 24 hour   Intake --   Output 500 ml   Net -500 ml       Lines/Drains/Airways     Line                 VAD 18 1124 Left ventricular assist device HeartMate  days          Peripheral Intravenous Line                 Peripheral IV - Single Lumen 10/25/19 0734 18 G Right Forearm less than 1 day                Physical Exam   Constitutional: He is oriented to person, place, and time. No distress.   HENT:   Head: Atraumatic.   Mouth/Throat: No oropharyngeal exudate.   Eyes: EOM are normal. No scleral icterus.   Neck: Neck supple. No JVD present.   Cardiovascular:   VAD hum    Pulmonary/Chest: Effort normal and breath sounds normal. No respiratory distress. He has no wheezes. He has no rales. He exhibits no tenderness.   Abdominal: Soft. Bowel sounds are normal. He exhibits no distension. There is no tenderness.   Musculoskeletal: He exhibits no edema.   Neurological: He is alert and oriented to person, place, and time. No cranial nerve deficit.   Skin: Skin is warm and dry. No erythema.   Psychiatric: He has a normal mood and affect.       Significant Labs:   BMP:   Recent Labs   Lab 10/25/19  0731         K 4.7      CO2 23   BUN 22*   CREATININE 1.9*   CALCIUM 9.8   , CMP   Recent Labs   Lab 10/25/19  0731      K 4.7      CO2 23      BUN 22*   CREATININE 1.9*   CALCIUM 9.8   PROT 8.3   ALBUMIN 4.0   BILITOT 0.4   ALKPHOS 121   AST 36   ALT 43   ANIONGAP 9   ESTGFRAFRICA 45.8*   EGFRNONAA 39.7*   , CBC   Recent Labs   Lab 10/25/19  0731   WBC 4.21   HGB 13.1*   HCT 44.8      , INR   Recent Labs   Lab 10/24/19  1220   INR 4.0*   , Lipid Panel No results for input(s): CHOL, HDL, LDLCALC, TRIG, CHOLHDL in the last 48 hours. and Troponin   Recent Labs   Lab 10/25/19  0731   TROPONINI 0.056*       Significant Imaging: Echocardiogram:   2D echo with color flow doppler:   Results for orders placed or performed during the hospital encounter of 07/18/18   2D Echo w/ Color Flow Doppler   Result Value Ref Range    QEF 15 (A) 55 - 65    Mitral Valve Regurgitation SEVERE (A)     Est. PA Systolic Pressure 33.6     Mitral Valve Mobility NORMAL     Tricuspid Valve Regurgitation MILD     Narrative    Date of Procedure: 07/18/2018        TEST DESCRIPTION   Technical Quality: This is a technically challenging study. There is poor endocardial definition.     General: A catheter is present in the right-sided cardiac chambers.     Aorta: The aortic root is normal in size, measuring 2.1 cm at sinotubular junction and 3.4 cm at Sinuses of Valsalva. The proximal  ascending aorta is normal in size, measuring 3.6 cm across.     Left Atrium: The left atrial volume index is severely enlarged, measuring 73.67 cc/m2.     Left Ventricle: The left ventricle is severely enlarged, with an end-diastolic diameter of 8.3 cm, and an end-systolic diameter of 7.7 cm. LV wall thickness is normal, with the septum and the posterior wall each measuring 0.8 cm across. Relative wall   thickness was normal at 0.19, and the LV mass index was increased at 172.0 g/m2 consistent with eccentric left ventricular hypertrophy. There is global hypokinesis. Left ventricular systolic function appears severely depressed. Visually estimated   ejection fraction is 15-20%.         Right Atrium: The right atrium is enlarged, measuring 6.8 cm in length and 5.1 cm in width in the apical view.     Right Ventricle: The right ventricle is normal in size measuring 3.4 cm at the base in the apical right ventricle-focused view. Global right ventricular systolic function appears moderately depressed. There is abnormal septal motion. Tricuspid annular   plane systolic excursion (TAPSE) is 1.2 cm. The estimated PA systolic pressure is 34 mmHg.     Aortic Valve:  The aortic valve is normal in structure.     Mitral Valve:  The mitral valve is normal in structure with normal leaflet mobility. There is severe mitral regurgitation.     Tricuspid Valve:  The tricuspid valve is normal in structure with normal leaflet mobility. There is mild tricuspid regurgitation.     Pulmonary Valve:  The pulmonic valve is normal in structure with normal leaflet mobility. There is trivial to mild pulmonic regurgitation.     IVC: IVC is enlarged but collapses > 50% with a sniff, suggesting intermediate right atrial pressure of 8 mmHg.     Intracavitary: There is no evidence of pericardial effusion, intracavity mass, thrombi, or vegetation.     Other: There is a left pleural effusion present.       LVAD: There is a HeartMate II LVAD in place.  Inflow cannula is visualized. Outflow graft is not visualized. Baseline speed is 9400 rpms. The aortic valve opens intermittently. Septum is midline.       CONCLUSIONS     1 - Severely depressed left ventricular systolic function (EF 15-20%).     2 - Eccentric hypertrophy.     3 - Biatrial enlargement.     4 - Moderately depressed right ventricular systolic function .     5 - The estimated PA systolic pressure is 34 mmHg.     6 - Severe mitral regurgitation.     7 - Mild tricuspid regurgitation.     8 - Trivial to mild pulmonic regurgitation.     9 - Intermediate central venous pressure.     10 - Left pleural effusion.     11 - HeartMate II LVAD; speed 9400.             This document has been electronically    SIGNED BY: Magi Delong MD On: 07/18/2018 17:10    and Transthoracic echo (TTE) complete (Cupid Only):   Results for orders placed or performed during the hospital encounter of 10/12/19   Echo Color Flow Doppler? Yes; Bubble Contrast? Yes   Result Value Ref Range    IVS 0.86 0.6 - 1.1 cm    LA size 3.86 cm    Left Atrium Major Axis 6.22 cm    Left Atrium Minor Axis 6.29 cm    LVIDD 9.00 (A) 3.5 - 6.0 cm    LVIDS 8.72 (A) 2.1 - 4.0 cm    LVOT diameter 2.16 cm    PW 1.05 0.6 - 1.1 cm    MV Peak A Jorge 0.91 m/s    E wave decelartion time 230.26 msec    MV Peak E Jorge 0.70 m/s    RA Major Axis 5.82 cm    RA Width 4.92 cm    Sinus 3.45 cm    TAPSE 1.60 cm    TR Max Jorge 2.57 m/s    TDI LATERAL 0.02 m/s    LA WIDTH 3.65 cm    LV Diastolic Volume 441.12 mL    LV Systolic Volume 417.56 mL    LV LATERAL E/E' RATIO 35.00 m/s    FS 3 %    LA volume 74.91 cm3    LV mass 474.50 g    Left Ventricle Relative Wall Thickness 0.23 cm    E/A ratio 0.77     LVOT area 3.7 cm2    LV Systolic Volume Index 173.6 mL/m2    LV Diastolic Volume Index 183.37 mL/m2    LA Volume Index 31.1 mL/m2    LV Mass Index 197 g/m2    Triscuspid Valve Regurgitation Peak Gradient 26 mmHg    BSA 2.46 m2    Right Atrial Pressure (from IVC) 8 mmHg    TV  rest pulmonary artery pressure 34 mmHg    RVDD 4.90 cm    Narrative    · Eccentric left ventricular hypertrophy. Severely decreased left   ventricular systolic function. The estimated ejection fraction is 10%  · Grade I (mild) left ventricular diastolic dysfunction consistent with   impaired relaxation.  · Severe left ventricular enlargement. LVEDD 9 cm  · LVAD present. Base speed is 46546 RPMs. The pump type is a Heartmate II.   Unable to see if AV opens. The septum is midline  · Mild-to-moderate mitral regurgitation.  · Mild tricuspid regurgitation.  · Intermediate central venous pressure (8 mm Hg).  · The estimated PA systolic pressure is 34 mm Hg        Assessment and Plan:     Defibrillator discharge  53M with HMII 3/8/2018 as DT, hx of VT, recent VT/VF in setting of ADHF, presenting with MMVT s/p ICD shock x1. Appears euvolemic (previous trigger for VT suspected to be ADHF), weight stable. Otherwise doing well.    -Stable for discharge home  -Continue current medications, change as below  -Increase amiodarone to 400 daily from 200 daily  -Will have him keep HTS follow up and set up EP follow up in about 6 weeks for consideration of further therapy, device adjustments, consideration of VT ablation if recurrent    Discussed with HTS staff Dr. Hadley and EP staff Dr. Bell.        VTE Risk Mitigation (From admission, onward)    None          Thank you for your consult. I will sign off. Please contact us if you have any additional questions.    Yohan Rizvi MD  Cardiology   [facility]

## 2019-10-25 NOTE — SUBJECTIVE & OBJECTIVE
Past Medical History:   Diagnosis Date    CHF (congestive heart failure)     Dilated cardiomyopathy 1/10/2018    Disorder of kidney and ureter     CKD    Gout     HTN (hypertension)     Ventricular tachycardia (paroxysmal)        Past Surgical History:   Procedure Laterality Date    CARDIAC CATHETERIZATION  Dec. 2012    CARDIAC DEFIBRILLATOR PLACEMENT Left     CRRT-D    COLONOSCOPY N/A 3/6/2018    Procedure: COLONOSCOPY;  Surgeon: Alonso Bone MD;  Location: [facility] ENDO (2ND FLR);  Service: Endoscopy;  Laterality: N/A;    COLONOSCOPY N/A 7/17/2019    Procedure: COLONOSCOPY;  Surgeon: Blane Valdez MD;  Location: [facility] ENDO (2ND FLR);  Service: Endoscopy;  Laterality: N/A;    COLONOSCOPY N/A 7/18/2019    Procedure: COLONOSCOPY;  Surgeon: Blane Valdez MD;  Location: [facility] ENDO (2ND FLR);  Service: Endoscopy;  Laterality: N/A;    ESOPHAGOGASTRODUODENOSCOPY N/A 7/17/2019    Procedure: EGD (ESOPHAGOGASTRODUODENOSCOPY);  Surgeon: Blane Valdez MD;  Location: [facility] (2ND FLR);  Service: Endoscopy;  Laterality: N/A;    ESOPHAGOGASTRODUODENOSCOPY N/A 7/18/2019    Procedure: EGD (ESOPHAGOGASTRODUODENOSCOPY);  Surgeon: Blane Valdez MD;  Location: [facility] (2ND FLR);  Service: Endoscopy;  Laterality: N/A;    NONINVASIVE CARDIAC ELECTROPHYSIOLOGY STUDY N/A 10/18/2019    Procedure: CARDIAC ELECTROPHYSIOLOGY STUDY, NONINVASIVE;  Surgeon: Raz Wagner MD;  Location: [facility] EP LAB;  Service: Cardiology;  Laterality: N/A;  VT, DFTs, MDT CRTD in situ, LVAD, LASHELL pizarro, 3098    TREATMENT OF CARDIAC ARRHYTHMIA  10/18/2019    Procedure: Cardioversion or Defibrillation;  Surgeon: Raz Wagner MD;  Location: [facility] EP LAB;  Service: Cardiology;;       Review of patient's allergies indicates:   Allergen Reactions    Lisinopril Anaphylaxis       No current facility-administered medications on file prior to encounter.      Current Outpatient Medications on File Prior to Encounter   Medication Sig    allopurinol (ZYLOPRIM) 100  MG tablet TAKE 1 TABLET BY MOUTH EVERY DAY    amLODIPine (NORVASC) 10 MG tablet Take 1 tablet (10 mg total) by mouth once daily.    doxazosin (CARDURA) 8 MG Tab Take 1 tablet (8 mg total) by mouth every 12 (twelve) hours.    ferrous gluconate (FERGON) 324 MG tablet Take 1 tablet (324 mg total) by mouth daily with breakfast.    magnesium oxide (MAG-OX) 400 mg (241.3 mg magnesium) tablet TAKE 1 TABLET (400 MG TOTAL) BY MOUTH 3 (THREE) TIMES DAILY.    mesalamine (PENTASA) 250 mg CpSR Take 4 capsules (1,000 mg total) by mouth 4 (four) times daily.    pantoprazole (PROTONIX) 40 MG tablet TAKE 1 TABLET (40 MG TOTAL) BY MOUTH ONCE DAILY.    potassium chloride (K-TAB) 20 mEq Take 1 tablet (20 mEq total) by mouth 2 (two) times daily.    spironolactone (ALDACTONE) 25 MG tablet Take 1 tablet (25 mg total) by mouth once daily.    torsemide (DEMADEX) 20 MG Tab Take 40 mg (2 tabs) qam and 20 mg (1 tab) qpm for 3 days then 40 mg (2 tabs) daily.    warfarin (COUMADIN) 5 MG tablet Take 1.5 tabs by mouth on  only, followed by weekly dose of 1 tablet daily, except 1.5 tabs on Mon, Wed, Fri    [DISCONTINUED] amiodarone (PACERONE) 200 MG Tab TAKE 1 TABLET (200 MG TOTAL) BY MOUTH ONCE DAILY.    sildenafil (VIAGRA) 100 MG tablet Take 1 tablet (100 mg total) by mouth daily as needed for Erectile Dysfunction.     Family History     Problem Relation (Age of Onset)    Cancer Sister (54)    Coronary artery disease Father    Diabetes Father    Heart disease Father    Hypertension Father    No Known Problems Mother, Brother        Tobacco Use    Smoking status: Former Smoker     Packs/day: 1.00     Years: 31.00     Pack years: 31.00     Types: Cigarettes     Last attempt to quit: 2018     Years since quittin.7    Smokeless tobacco: Never Used    Tobacco comment: pt is quiting on his own - pt stated not qualified for program;  pt  quit on his own   Substance and Sexual Activity    Alcohol use: No     Alcohol/week:  0.0 standard drinks     Comment: one fifth of gin or rum/week    Drug use: No    Sexual activity: Yes     Partners: Female     Birth control/protection: None     Comment: 10/5/17  with same partner 7 years      Review of Systems   All other systems reviewed and are negative.    Objective:     Vital Signs (Most Recent):  Temp: 98.8 °F (37.1 °C) (10/25/19 0712)  Pulse: 76 (10/25/19 0729)  Resp: 16 (10/25/19 0712)  BP: (unable to obtain in triage-LVAD) (10/25/19 0712)  SpO2: 100 % (10/25/19 0712) Vital Signs (24h Range):  Temp:  [98.8 °F (37.1 °C)] 98.8 °F (37.1 °C)  Pulse:  [74-76] 76  Resp:  [16] 16  SpO2:  [100 %] 100 %     Weight: 119.3 kg (263 lb)  Body mass index is 34.7 kg/m².    SpO2: 100 %  O2 Device (Oxygen Therapy): room air      Intake/Output Summary (Last 24 hours) at 10/25/2019 1017  Last data filed at 10/25/2019 0922  Gross per 24 hour   Intake --   Output 500 ml   Net -500 ml       Lines/Drains/Airways     Line                 VAD 03/08/18 1124 Left ventricular assist device HeartMate  days          Peripheral Intravenous Line                 Peripheral IV - Single Lumen 10/25/19 0734 18 G Right Forearm less than 1 day                Physical Exam   Constitutional: He is oriented to person, place, and time. No distress.   HENT:   Head: Atraumatic.   Mouth/Throat: No oropharyngeal exudate.   Eyes: EOM are normal. No scleral icterus.   Neck: Neck supple. No JVD present.   Cardiovascular: Normal rate, regular rhythm and normal heart sounds. Exam reveals no gallop and no friction rub.   No murmur heard.  Pulmonary/Chest: Effort normal and breath sounds normal. No respiratory distress. He has no wheezes. He has no rales. He exhibits no tenderness.   Abdominal: Soft. Bowel sounds are normal. He exhibits no distension. There is no tenderness.   Musculoskeletal: He exhibits no edema.   Neurological: He is alert and oriented to person, place, and time. No cranial nerve deficit.   Skin: Skin is  warm and dry. No erythema.   Psychiatric: He has a normal mood and affect.       Significant Labs:   BMP:   Recent Labs   Lab 10/25/19  0731         K 4.7      CO2 23   BUN 22*   CREATININE 1.9*   CALCIUM 9.8   , CMP   Recent Labs   Lab 10/25/19  0731      K 4.7      CO2 23      BUN 22*   CREATININE 1.9*   CALCIUM 9.8   PROT 8.3   ALBUMIN 4.0   BILITOT 0.4   ALKPHOS 121   AST 36   ALT 43   ANIONGAP 9   ESTGFRAFRICA 45.8*   EGFRNONAA 39.7*   , CBC   Recent Labs   Lab 10/25/19  0731   WBC 4.21   HGB 13.1*   HCT 44.8      , INR   Recent Labs   Lab 10/24/19  1220   INR 4.0*   , Lipid Panel No results for input(s): CHOL, HDL, LDLCALC, TRIG, CHOLHDL in the last 48 hours. and Troponin   Recent Labs   Lab 10/25/19  0731   TROPONINI 0.056*       Significant Imaging: Echocardiogram:   2D echo with color flow doppler:   Results for orders placed or performed during the hospital encounter of 07/18/18   2D Echo w/ Color Flow Doppler   Result Value Ref Range    QEF 15 (A) 55 - 65    Mitral Valve Regurgitation SEVERE (A)     Est. PA Systolic Pressure 33.6     Mitral Valve Mobility NORMAL     Tricuspid Valve Regurgitation MILD     Narrative    Date of Procedure: 07/18/2018        TEST DESCRIPTION   Technical Quality: This is a technically challenging study. There is poor endocardial definition.     General: A catheter is present in the right-sided cardiac chambers.     Aorta: The aortic root is normal in size, measuring 2.1 cm at sinotubular junction and 3.4 cm at Sinuses of Valsalva. The proximal ascending aorta is normal in size, measuring 3.6 cm across.     Left Atrium: The left atrial volume index is severely enlarged, measuring 73.67 cc/m2.     Left Ventricle: The left ventricle is severely enlarged, with an end-diastolic diameter of 8.3 cm, and an end-systolic diameter of 7.7 cm. LV wall thickness is normal, with the septum and the posterior wall each measuring 0.8 cm across.  Relative wall   thickness was normal at 0.19, and the LV mass index was increased at 172.0 g/m2 consistent with eccentric left ventricular hypertrophy. There is global hypokinesis. Left ventricular systolic function appears severely depressed. Visually estimated   ejection fraction is 15-20%.         Right Atrium: The right atrium is enlarged, measuring 6.8 cm in length and 5.1 cm in width in the apical view.     Right Ventricle: The right ventricle is normal in size measuring 3.4 cm at the base in the apical right ventricle-focused view. Global right ventricular systolic function appears moderately depressed. There is abnormal septal motion. Tricuspid annular   plane systolic excursion (TAPSE) is 1.2 cm. The estimated PA systolic pressure is 34 mmHg.     Aortic Valve:  The aortic valve is normal in structure.     Mitral Valve:  The mitral valve is normal in structure with normal leaflet mobility. There is severe mitral regurgitation.     Tricuspid Valve:  The tricuspid valve is normal in structure with normal leaflet mobility. There is mild tricuspid regurgitation.     Pulmonary Valve:  The pulmonic valve is normal in structure with normal leaflet mobility. There is trivial to mild pulmonic regurgitation.     IVC: IVC is enlarged but collapses > 50% with a sniff, suggesting intermediate right atrial pressure of 8 mmHg.     Intracavitary: There is no evidence of pericardial effusion, intracavity mass, thrombi, or vegetation.     Other: There is a left pleural effusion present.       LVAD: There is a HeartMate II LVAD in place. Inflow cannula is visualized. Outflow graft is not visualized. Baseline speed is 9400 rpms. The aortic valve opens intermittently. Septum is midline.       CONCLUSIONS     1 - Severely depressed left ventricular systolic function (EF 15-20%).     2 - Eccentric hypertrophy.     3 - Biatrial enlargement.     4 - Moderately depressed right ventricular systolic function .     5 - The estimated PA  systolic pressure is 34 mmHg.     6 - Severe mitral regurgitation.     7 - Mild tricuspid regurgitation.     8 - Trivial to mild pulmonic regurgitation.     9 - Intermediate central venous pressure.     10 - Left pleural effusion.     11 - HeartMate II LVAD; speed 9400.             This document has been electronically    SIGNED BY: Magi Delong MD On: 07/18/2018 17:10    and Transthoracic echo (TTE) complete (Cupid Only):   Results for orders placed or performed during the hospital encounter of 10/12/19   Echo Color Flow Doppler? Yes; Bubble Contrast? Yes   Result Value Ref Range    IVS 0.86 0.6 - 1.1 cm    LA size 3.86 cm    Left Atrium Major Axis 6.22 cm    Left Atrium Minor Axis 6.29 cm    LVIDD 9.00 (A) 3.5 - 6.0 cm    LVIDS 8.72 (A) 2.1 - 4.0 cm    LVOT diameter 2.16 cm    PW 1.05 0.6 - 1.1 cm    MV Peak A Jorge 0.91 m/s    E wave decelartion time 230.26 msec    MV Peak E Jorge 0.70 m/s    RA Major Axis 5.82 cm    RA Width 4.92 cm    Sinus 3.45 cm    TAPSE 1.60 cm    TR Max Jorge 2.57 m/s    TDI LATERAL 0.02 m/s    LA WIDTH 3.65 cm    LV Diastolic Volume 441.12 mL    LV Systolic Volume 417.56 mL    LV LATERAL E/E' RATIO 35.00 m/s    FS 3 %    LA volume 74.91 cm3    LV mass 474.50 g    Left Ventricle Relative Wall Thickness 0.23 cm    E/A ratio 0.77     LVOT area 3.7 cm2    LV Systolic Volume Index 173.6 mL/m2    LV Diastolic Volume Index 183.37 mL/m2    LA Volume Index 31.1 mL/m2    LV Mass Index 197 g/m2    Triscuspid Valve Regurgitation Peak Gradient 26 mmHg    BSA 2.46 m2    Right Atrial Pressure (from IVC) 8 mmHg    TV rest pulmonary artery pressure 34 mmHg    RVDD 4.90 cm    Narrative    · Eccentric left ventricular hypertrophy. Severely decreased left   ventricular systolic function. The estimated ejection fraction is 10%  · Grade I (mild) left ventricular diastolic dysfunction consistent with   impaired relaxation.  · Severe left ventricular enlargement. LVEDD 9 cm  · LVAD present. [place] is 81983  RPMs. The pump type is a Heartmate II.   Unable to see if AV opens. The septum is midline  · Mild-to-moderate mitral regurgitation.  · Mild tricuspid regurgitation.  · Intermediate central venous pressure (8 mm Hg).  · The estimated PA systolic pressure is 34 mm Hg

## 2019-10-25 NOTE — ED TRIAGE NOTES
"Pt arrived from VAD clinic with wife. Pt reports being awoken out of sleep with palpitations, no chest pain. Pt states "like it was really racing." AICD fired once this morning at 530 am after event. Denies chest pain or SOB at this time. Denies any symptoms currently. Pt has LVAD- Heartmate 2.   "

## 2019-10-25 NOTE — HPI
54 yo AAM with NICM s/p HM 2 LVAD 3/8/18 as DT with repositioning of outflow graft 3/9/18, BiV [employer] AICD, Hx of VT, GIB 7/2019, HTN, CKDIII presenting after single ICD shock this AM. He reports doing well since recent discharge, occasional palpitations while driving over last week. Weight stable, doing well with diuretics. Awoke from sleep this AM with palpitations and subsequent ICD shock x1. No LOC, felt normal afterwards and presented here.    He was recently admitted with a prolonged VT/VF episode on 10/12, found to be in decompensated HF with weight up ~20 lbs. ICD interrogation showed MMVT , unsuccessful ATP x3, unsuccssful DCCV x1, unsuccessful ATP burst x2, degenerated into VF with DCCV x5 unsuccessful. In ER, he was still found to be in VF and underwent successful unsynchronized external 120V DCCV. He was reloaded with amiodarone and diuresed to his dry weight. After IV amio loading, amiodarone dropped back to previous dose 200 mg daily. He underwent successful DFT with EP prior to discharge and minor device settings changes were made (see procedure note); unsuccessful shocks suspected due to increased threshold in setting of ADHF, amiodarone increasing defibrillatory tissue threshold or change in heart shock vector post LVAD implant. BiV pacing turned off, as was ATP for his VT zone as well.    In terms of prior VT history- he had the presentation noted above on 10/12/2019. Previously with one episode of treated VT (10/2018) post LVAD implant in March 2018. Per chart review, he had ICD implanted for secondary prevention for VT 10/31/2014 and previously had multiple episodes of device treated VT (7/2017, 12/2017) prior to LVAD.    ICD interrogation today:  1 episode 10/25/2019 04:27, duration 19s. MMVT ( ms) precipitated by PVC, successful 36J shock, return to AP-.     Mode: DDD  Rate 50 - 120 BPM, Upper track 120BPM  V. Pacing RV, Nonadaptive CRT  Total AS- 16.5%, AS-VS 83.1%  AT/AF:  Monitor >171 BPM, therapies off  VF: >231BPM, 35J x6    - 231 BPM, 35J x 6  Optivol stable  Battery: estimated 5 months.

## 2019-10-25 NOTE — DISCHARGE INSTRUCTIONS
Our goal in the emergency department is to always give you outstanding care and exceptional service. You may receive a survey by mail or e-mail in the next week regarding your experience in our ED. We would greatly appreciate your completing and returning the survey. Your feedback provides us with a way to recognize our staff who give very good care and it helps us learn how to improve when your experience was below our aspiration of excellence.   -    Increase Amiodarone to 400 mg daily. A new prescription has been sent to your pharmacy.    Return to the emergency department for worsening in any way including if your AICD fires again or any other problems.

## 2019-10-25 NOTE — SUBJECTIVE & OBJECTIVE
Past Medical History:   Diagnosis Date    CHF (congestive heart failure)     Dilated cardiomyopathy 1/10/2018    Disorder of kidney and ureter     CKD    Gout     HTN (hypertension)     Ventricular tachycardia (paroxysmal)        Past Surgical History:   Procedure Laterality Date    CARDIAC CATHETERIZATION  Dec. 2012    CARDIAC DEFIBRILLATOR PLACEMENT Left     CRRT-D    COLONOSCOPY N/A 3/6/2018    Procedure: COLONOSCOPY;  Surgeon: Alonso Bone MD;  Location: [facility] ENDO (2ND FLR);  Service: Endoscopy;  Laterality: N/A;    COLONOSCOPY N/A 7/17/2019    Procedure: COLONOSCOPY;  Surgeon: Blane Valdez MD;  Location: [facility] ENDO (2ND FLR);  Service: Endoscopy;  Laterality: N/A;    COLONOSCOPY N/A 7/18/2019    Procedure: COLONOSCOPY;  Surgeon: Blane Valdez MD;  Location: [facility] ENDO (2ND FLR);  Service: Endoscopy;  Laterality: N/A;    ESOPHAGOGASTRODUODENOSCOPY N/A 7/17/2019    Procedure: EGD (ESOPHAGOGASTRODUODENOSCOPY);  Surgeon: Blane Valdez MD;  Location: [facility] (2ND FLR);  Service: Endoscopy;  Laterality: N/A;    ESOPHAGOGASTRODUODENOSCOPY N/A 7/18/2019    Procedure: EGD (ESOPHAGOGASTRODUODENOSCOPY);  Surgeon: Blane Valdez MD;  Location: [facility] (2ND FLR);  Service: Endoscopy;  Laterality: N/A;    NONINVASIVE CARDIAC ELECTROPHYSIOLOGY STUDY N/A 10/18/2019    Procedure: CARDIAC ELECTROPHYSIOLOGY STUDY, NONINVASIVE;  Surgeon: Raz Wagner MD;  Location: [facility] EP LAB;  Service: Cardiology;  Laterality: N/A;  VT, DFTs, MDT CRTD in situ, LVAD, LASHELL pizarro, 3098    TREATMENT OF CARDIAC ARRHYTHMIA  10/18/2019    Procedure: Cardioversion or Defibrillation;  Surgeon: Raz Wagner MD;  Location: [facility] EP LAB;  Service: Cardiology;;       Review of patient's allergies indicates:   Allergen Reactions    Lisinopril Anaphylaxis       No current facility-administered medications on file prior to encounter.      Current Outpatient Medications on File Prior to Encounter   Medication Sig    allopurinol (ZYLOPRIM) 100  MG tablet TAKE 1 TABLET BY MOUTH EVERY DAY    amLODIPine (NORVASC) 10 MG tablet Take 1 tablet (10 mg total) by mouth once daily.    doxazosin (CARDURA) 8 MG Tab Take 1 tablet (8 mg total) by mouth every 12 (twelve) hours.    ferrous gluconate (FERGON) 324 MG tablet Take 1 tablet (324 mg total) by mouth daily with breakfast.    magnesium oxide (MAG-OX) 400 mg (241.3 mg magnesium) tablet TAKE 1 TABLET (400 MG TOTAL) BY MOUTH 3 (THREE) TIMES DAILY.    mesalamine (PENTASA) 250 mg CpSR Take 4 capsules (1,000 mg total) by mouth 4 (four) times daily.    pantoprazole (PROTONIX) 40 MG tablet TAKE 1 TABLET (40 MG TOTAL) BY MOUTH ONCE DAILY.    potassium chloride (K-TAB) 20 mEq Take 1 tablet (20 mEq total) by mouth 2 (two) times daily.    spironolactone (ALDACTONE) 25 MG tablet Take 1 tablet (25 mg total) by mouth once daily.    torsemide (DEMADEX) 20 MG Tab Take 40 mg (2 tabs) qam and 20 mg (1 tab) qpm for 3 days then 40 mg (2 tabs) daily.    warfarin (COUMADIN) 5 MG tablet Take 1.5 tabs by mouth on  only, followed by weekly dose of 1 tablet daily, except 1.5 tabs on Mon, Wed, Fri    [DISCONTINUED] amiodarone (PACERONE) 200 MG Tab TAKE 1 TABLET (200 MG TOTAL) BY MOUTH ONCE DAILY.    sildenafil (VIAGRA) 100 MG tablet Take 1 tablet (100 mg total) by mouth daily as needed for Erectile Dysfunction.     Family History     Problem Relation (Age of Onset)    Cancer Sister (54)    Coronary artery disease Father    Diabetes Father    Heart disease Father    Hypertension Father    No Known Problems Mother, Brother        Tobacco Use    Smoking status: Former Smoker     Packs/day: 1.00     Years: 31.00     Pack years: 31.00     Types: Cigarettes     Last attempt to quit: 2018     Years since quittin.7    Smokeless tobacco: Never Used    Tobacco comment: pt is quiting on his own - pt stated not qualified for program;  pt  quit on his own   Substance and Sexual Activity    Alcohol use: No     Alcohol/week:  0.0 standard drinks     Comment: one fifth of gin or rum/week    Drug use: No    Sexual activity: Yes     Partners: Female     Birth control/protection: None     Comment: 10/5/17  with same partner 7 years      Review of Systems   All other systems reviewed and are negative.    Objective:     Vital Signs (Most Recent):  Temp: 98.8 °F (37.1 °C) (10/25/19 0712)  Pulse: 76 (10/25/19 0729)  Resp: 16 (10/25/19 0712)  BP: (unable to obtain in triage-LVAD) (10/25/19 0712)  SpO2: 100 % (10/25/19 0712) Vital Signs (24h Range):  Temp:  [98.8 °F (37.1 °C)] 98.8 °F (37.1 °C)  Pulse:  [74-76] 76  Resp:  [16] 16  SpO2:  [100 %] 100 %     Weight: 119.3 kg (263 lb)  Body mass index is 34.7 kg/m².    SpO2: 100 %  O2 Device (Oxygen Therapy): room air      Intake/Output Summary (Last 24 hours) at 10/25/2019 0954  Last data filed at 10/25/2019 0922  Gross per 24 hour   Intake --   Output 500 ml   Net -500 ml       Lines/Drains/Airways     Line                 VAD 03/08/18 1124 Left ventricular assist device HeartMate  days          Peripheral Intravenous Line                 Peripheral IV - Single Lumen 10/25/19 0734 18 G Right Forearm less than 1 day                Physical Exam   Constitutional: He is oriented to person, place, and time. No distress.   HENT:   Head: Atraumatic.   Mouth/Throat: No oropharyngeal exudate.   Eyes: EOM are normal. No scleral icterus.   Neck: Neck supple. No JVD present.   Cardiovascular:   VAD hum   Pulmonary/Chest: Effort normal and breath sounds normal. No respiratory distress. He has no wheezes. He has no rales. He exhibits no tenderness.   Abdominal: Soft. Bowel sounds are normal. He exhibits no distension. There is no tenderness.   Musculoskeletal: He exhibits no edema.   Neurological: He is alert and oriented to person, place, and time. No cranial nerve deficit.   Skin: Skin is warm and dry. No erythema.   Psychiatric: He has a normal mood and affect.       Significant Labs:   BMP:    Recent Labs   Lab 10/25/19  0731         K 4.7      CO2 23   BUN 22*   CREATININE 1.9*   CALCIUM 9.8   , CMP   Recent Labs   Lab 10/25/19  0731      K 4.7      CO2 23      BUN 22*   CREATININE 1.9*   CALCIUM 9.8   PROT 8.3   ALBUMIN 4.0   BILITOT 0.4   ALKPHOS 121   AST 36   ALT 43   ANIONGAP 9   ESTGFRAFRICA 45.8*   EGFRNONAA 39.7*   , CBC   Recent Labs   Lab 10/25/19  0731   WBC 4.21   HGB 13.1*   HCT 44.8      , INR   Recent Labs   Lab 10/24/19  1220   INR 4.0*   , Lipid Panel No results for input(s): CHOL, HDL, LDLCALC, TRIG, CHOLHDL in the last 48 hours. and Troponin   Recent Labs   Lab 10/25/19  0731   TROPONINI 0.056*       Significant Imaging: Echocardiogram:   2D echo with color flow doppler:   Results for orders placed or performed during the hospital encounter of 07/18/18   2D Echo w/ Color Flow Doppler   Result Value Ref Range    QEF 15 (A) 55 - 65    Mitral Valve Regurgitation SEVERE (A)     Est. PA Systolic Pressure 33.6     Mitral Valve Mobility NORMAL     Tricuspid Valve Regurgitation MILD     Narrative    Date of Procedure: 07/18/2018        TEST DESCRIPTION   Technical Quality: This is a technically challenging study. There is poor endocardial definition.     General: A catheter is present in the right-sided cardiac chambers.     Aorta: The aortic root is normal in size, measuring 2.1 cm at sinotubular junction and 3.4 cm at Sinuses of Valsalva. The proximal ascending aorta is normal in size, measuring 3.6 cm across.     Left Atrium: The left atrial volume index is severely enlarged, measuring 73.67 cc/m2.     Left Ventricle: The left ventricle is severely enlarged, with an end-diastolic diameter of 8.3 cm, and an end-systolic diameter of 7.7 cm. LV wall thickness is normal, with the septum and the posterior wall each measuring 0.8 cm across. Relative wall   thickness was normal at 0.19, and the LV mass index was increased at 172.0 g/m2 consistent with  eccentric left ventricular hypertrophy. There is global hypokinesis. Left ventricular systolic function appears severely depressed. Visually estimated   ejection fraction is 15-20%.         Right Atrium: The right atrium is enlarged, measuring 6.8 cm in length and 5.1 cm in width in the apical view.     Right Ventricle: The right ventricle is normal in size measuring 3.4 cm at the base in the apical right ventricle-focused view. Global right ventricular systolic function appears moderately depressed. There is abnormal septal motion. Tricuspid annular   plane systolic excursion (TAPSE) is 1.2 cm. The estimated PA systolic pressure is 34 mmHg.     Aortic Valve:  The aortic valve is normal in structure.     Mitral Valve:  The mitral valve is normal in structure with normal leaflet mobility. There is severe mitral regurgitation.     Tricuspid Valve:  The tricuspid valve is normal in structure with normal leaflet mobility. There is mild tricuspid regurgitation.     Pulmonary Valve:  The pulmonic valve is normal in structure with normal leaflet mobility. There is trivial to mild pulmonic regurgitation.     IVC: IVC is enlarged but collapses > 50% with a sniff, suggesting intermediate right atrial pressure of 8 mmHg.     Intracavitary: There is no evidence of pericardial effusion, intracavity mass, thrombi, or vegetation.     Other: There is a left pleural effusion present.       LVAD: There is a HeartMate II LVAD in place. Inflow cannula is visualized. Outflow graft is not visualized. Baseline speed is 9400 rpms. The aortic valve opens intermittently. Septum is midline.       CONCLUSIONS     1 - Severely depressed left ventricular systolic function (EF 15-20%).     2 - Eccentric hypertrophy.     3 - Biatrial enlargement.     4 - Moderately depressed right ventricular systolic function .     5 - The estimated PA systolic pressure is 34 mmHg.     6 - Severe mitral regurgitation.     7 - Mild tricuspid regurgitation.     8 -  Trivial to mild pulmonic regurgitation.     9 - Intermediate central venous pressure.     10 - Left pleural effusion.     11 - HeartMate II LVAD; speed 9400.             This document has been electronically    SIGNED BY: Magi Delong MD On: 07/18/2018 17:10    and Transthoracic echo (TTE) complete (Cupid Only):   Results for orders placed or performed during the hospital encounter of 10/12/19   Echo Color Flow Doppler? Yes; Bubble Contrast? Yes   Result Value Ref Range    IVS 0.86 0.6 - 1.1 cm    LA size 3.86 cm    Left Atrium Major Axis 6.22 cm    Left Atrium Minor Axis 6.29 cm    LVIDD 9.00 (A) 3.5 - 6.0 cm    LVIDS 8.72 (A) 2.1 - 4.0 cm    LVOT diameter 2.16 cm    PW 1.05 0.6 - 1.1 cm    MV Peak A Jorge 0.91 m/s    E wave decelartion time 230.26 msec    MV Peak E Jorge 0.70 m/s    RA Major Axis 5.82 cm    RA Width 4.92 cm    Sinus 3.45 cm    TAPSE 1.60 cm    TR Max Jorge 2.57 m/s    TDI LATERAL 0.02 m/s    LA WIDTH 3.65 cm    LV Diastolic Volume 441.12 mL    LV Systolic Volume 417.56 mL    LV LATERAL E/E' RATIO 35.00 m/s    FS 3 %    LA volume 74.91 cm3    LV mass 474.50 g    Left Ventricle Relative Wall Thickness 0.23 cm    E/A ratio 0.77     LVOT area 3.7 cm2    LV Systolic Volume Index 173.6 mL/m2    LV Diastolic Volume Index 183.37 mL/m2    LA Volume Index 31.1 mL/m2    LV Mass Index 197 g/m2    Triscuspid Valve Regurgitation Peak Gradient 26 mmHg    BSA 2.46 m2    Right Atrial Pressure (from IVC) 8 mmHg    TV rest pulmonary artery pressure 34 mmHg    RVDD 4.90 cm    Narrative    · Eccentric left ventricular hypertrophy. Severely decreased left   ventricular systolic function. The estimated ejection fraction is 10%  · Grade I (mild) left ventricular diastolic dysfunction consistent with   impaired relaxation.  · Severe left ventricular enlargement. LVEDD 9 cm  · LVAD present[place] [address] RPMs. The pump type is a Heartmate II.   Unable to see if AV opens. The septum is midline  · Mild-to-moderate  mitral regurgitation.  · Mild tricuspid regurgitation.  · Intermediate central venous pressure (8 mm Hg).  · The estimated PA systolic pressure is 34 mm Hg

## 2019-10-28 ENCOUNTER — HOSPITAL ENCOUNTER (EMERGENCY)
Facility: HOSPITAL | Age: 54
Discharge: HOME | End: 2019-10-28
Attending: EMERGENCY MEDICINE
Payer: COMMERCIAL

## 2019-10-28 ENCOUNTER — DOCUMENTATION ONLY (OUTPATIENT)
Dept: ELECTROPHYSIOLOGY | Facility: CLINIC | Age: 54
End: 2019-10-28

## 2019-10-28 ENCOUNTER — PATIENT MESSAGE (OUTPATIENT)
Dept: ELECTROPHYSIOLOGY | Facility: CLINIC | Age: 54
End: 2019-10-28

## 2019-10-28 VITALS
RESPIRATION RATE: 18 BRPM | HEIGHT: 73 IN | HEART RATE: 80 BPM | SYSTOLIC BLOOD PRESSURE: 88 MMHG | BODY MASS INDEX: 34.99 KG/M2 | WEIGHT: 264 LBS | TEMPERATURE: 99 F | OXYGEN SATURATION: 98 %

## 2019-10-28 DIAGNOSIS — Z95.811 LVAD (LEFT VENTRICULAR ASSIST DEVICE) PRESENT: ICD-10-CM

## 2019-10-28 DIAGNOSIS — R00.2 PALPITATIONS: Primary | ICD-10-CM

## 2019-10-28 LAB
ALBUMIN SERPL BCP-MCNC: 4 G/DL (ref 3.5–5.2)
ALP SERPL-CCNC: 117 U/L (ref 55–135)
ALT SERPL W/O P-5'-P-CCNC: 40 U/L (ref 10–44)
ANION GAP SERPL CALC-SCNC: 7 MMOL/L (ref 8–16)
AST SERPL-CCNC: 32 U/L (ref 10–40)
BASOPHILS # BLD AUTO: 0.02 K/UL (ref 0–0.2)
BASOPHILS NFR BLD: 0.4 % (ref 0–1.9)
BILIRUB SERPL-MCNC: 0.6 MG/DL (ref 0.1–1)
BNP SERPL-MCNC: 174 PG/ML (ref 0–99)
BUN SERPL-MCNC: 19 MG/DL (ref 6–20)
CALCIUM SERPL-MCNC: 10.2 MG/DL (ref 8.7–10.5)
CHLORIDE SERPL-SCNC: 102 MMOL/L (ref 95–110)
CO2 SERPL-SCNC: 27 MMOL/L (ref 23–29)
CREAT SERPL-MCNC: 2 MG/DL (ref 0.5–1.4)
DIFFERENTIAL METHOD: ABNORMAL
EOSINOPHIL # BLD AUTO: 0 K/UL (ref 0–0.5)
EOSINOPHIL NFR BLD: 0.8 % (ref 0–8)
ERYTHROCYTE [DISTWIDTH] IN BLOOD BY AUTOMATED COUNT: 14 % (ref 11.5–14.5)
EST. GFR  (AFRICAN AMERICAN): 43.1 ML/MIN/1.73 M^2
EST. GFR  (NON AFRICAN AMERICAN): 37.3 ML/MIN/1.73 M^2
GLUCOSE SERPL-MCNC: 83 MG/DL (ref 70–110)
HCT VFR BLD AUTO: 42.7 % (ref 40–54)
HGB BLD-MCNC: 13.3 G/DL (ref 14–18)
IMM GRANULOCYTES # BLD AUTO: 0.01 K/UL (ref 0–0.04)
IMM GRANULOCYTES NFR BLD AUTO: 0.2 % (ref 0–0.5)
INR PPP: 2.8 (ref 0.8–1.2)
LYMPHOCYTES # BLD AUTO: 0.8 K/UL (ref 1–4.8)
LYMPHOCYTES NFR BLD: 17.1 % (ref 18–48)
MAGNESIUM SERPL-MCNC: 2 MG/DL (ref 1.6–2.6)
MCH RBC QN AUTO: 25.7 PG (ref 27–31)
MCHC RBC AUTO-ENTMCNC: 31.1 G/DL (ref 32–36)
MCV RBC AUTO: 83 FL (ref 82–98)
MONOCYTES # BLD AUTO: 0.8 K/UL (ref 0.3–1)
MONOCYTES NFR BLD: 15.3 % (ref 4–15)
NEUTROPHILS # BLD AUTO: 3.2 K/UL (ref 1.8–7.7)
NEUTROPHILS NFR BLD: 66.2 % (ref 38–73)
NRBC BLD-RTO: 0 /100 WBC
PLATELET # BLD AUTO: 227 K/UL (ref 150–350)
PMV BLD AUTO: 10.5 FL (ref 9.2–12.9)
POTASSIUM SERPL-SCNC: 4.3 MMOL/L (ref 3.5–5.1)
PROT SERPL-MCNC: 8.3 G/DL (ref 6–8.4)
PROTHROMBIN TIME: 27.5 SEC (ref 9–12.5)
RBC # BLD AUTO: 5.17 M/UL (ref 4.6–6.2)
SODIUM SERPL-SCNC: 136 MMOL/L (ref 136–145)
T4 FREE SERPL-MCNC: 3.5 NG/DL (ref 0.71–1.51)
TROPONIN I SERPL DL<=0.01 NG/ML-MCNC: 0.05 NG/ML (ref 0–0.03)
TSH SERPL DL<=0.005 MIU/L-ACNC: <0.01 UIU/ML (ref 0.4–4)
WBC # BLD AUTO: 4.9 K/UL (ref 3.9–12.7)

## 2019-10-28 PROCEDURE — 84439 ASSAY OF FREE THYROXINE: CPT

## 2019-10-28 PROCEDURE — 84443 ASSAY THYROID STIM HORMONE: CPT

## 2019-10-28 PROCEDURE — 93010 ELECTROCARDIOGRAM REPORT: CPT | Mod: ,,, | Performed by: INTERNAL MEDICINE

## 2019-10-28 PROCEDURE — 99285 EMERGENCY DEPT VISIT HI MDM: CPT | Mod: 25

## 2019-10-28 PROCEDURE — 83735 ASSAY OF MAGNESIUM: CPT

## 2019-10-28 PROCEDURE — 85025 COMPLETE CBC W/AUTO DIFF WBC: CPT

## 2019-10-28 PROCEDURE — 36000 PLACE NEEDLE IN VEIN: CPT

## 2019-10-28 PROCEDURE — 85610 PROTHROMBIN TIME: CPT

## 2019-10-28 PROCEDURE — 80053 COMPREHEN METABOLIC PANEL: CPT

## 2019-10-28 PROCEDURE — 93010 EKG 12-LEAD: ICD-10-PCS | Mod: ,,, | Performed by: INTERNAL MEDICINE

## 2019-10-28 PROCEDURE — 99285 PR EMERGENCY DEPT VISIT,LEVEL V: ICD-10-PCS | Mod: ,,, | Performed by: EMERGENCY MEDICINE

## 2019-10-28 PROCEDURE — 93005 ELECTROCARDIOGRAM TRACING: CPT

## 2019-10-28 PROCEDURE — 83880 ASSAY OF NATRIURETIC PEPTIDE: CPT

## 2019-10-28 PROCEDURE — 99285 EMERGENCY DEPT VISIT HI MDM: CPT | Mod: ,,, | Performed by: EMERGENCY MEDICINE

## 2019-10-28 PROCEDURE — 84484 ASSAY OF TROPONIN QUANT: CPT

## 2019-10-28 NOTE — ED PROVIDER NOTES
Encounter Date: 10/28/2019       History     Chief Complaint   Patient presents with    Palpitations     LVAD, denies numbness at this time, aicd, alicia Davila lvad coord notified pt in er     The patient is a 53-year-old male with a history of dilated cardiomyopathy and subsequent congestive heart failure with an AICD/pacemaker and LVAD in place.  The patient presents to the emergency department with complaints of intermittent palpitations.  Each episode only lasts a few seconds.  He states that he has been having episodes over the past week or so.  Over the weekend, the symptoms became more severe.  He states that each episode is associated with chest discomfort which he rates a 4/10 in severity.  It is also associated with some nausea.  He feels that walking around when the episodes begin has actually helped relieve the symptoms to a certain degree.  He is unclear of the etiology.  He called his heart transplant coordinator who instructed him to come to the emergency department for further evaluation.  He has no complaints at this time.        Review of patient's allergies indicates:   Allergen Reactions    Lisinopril Anaphylaxis     Past Medical History:   Diagnosis Date    CHF (congestive heart failure)     Dilated cardiomyopathy 1/10/2018    Disorder of kidney and ureter     CKD    Gout     HTN (hypertension)     Ventricular tachycardia (paroxysmal)      Past Surgical History:   Procedure Laterality Date    CARDIAC CATHETERIZATION  Dec. 2012    CARDIAC DEFIBRILLATOR PLACEMENT Left     CRRT-D    COLONOSCOPY N/A 3/6/2018    Procedure: COLONOSCOPY;  Surgeon: Alonso Bone MD;  Location: [facility] ([address] [place] [address]);  Service: Endoscopy;  Laterality: N/A;    COLONOSCOPY N/A 7/17/2019    Procedure: COLONOSCOPY;  Surgeon: Blane Valdez MD;  Location: [facility] ([address] [place] [address]);  Service: Endoscopy;  Laterality: N/A;    COLONOSCOPY N/A 7/18/2019    Procedure: COLONOSCOPY;  Surgeon: Blane Valdez MD;  Location: [facility]  (2ND FLR);  Service: Endoscopy;  Laterality: N/A;    ESOPHAGOGASTRODUODENOSCOPY N/A 2019    Procedure: EGD (ESOPHAGOGASTRODUODENOSCOPY);  Surgeon: Blane Valdez MD;  Location: [facility] ENDO (2ND FLR);  Service: Endoscopy;  Laterality: N/A;    ESOPHAGOGASTRODUODENOSCOPY N/A 2019    Procedure: EGD (ESOPHAGOGASTRODUODENOSCOPY);  Surgeon: Blane Valdez MD;  [facility]  Service: Endoscopy;  Laterality: N/A;    NONINVASIVE CARDIAC ELECTROPHYSIOLOGY STUDY N/A 10/18/2019    Procedure: CARDIAC ELECTROPHYSIOLOGY STUDY, NONINVASIVE;  Surgeon: Raz Wagner MD;  Location: [facility] EP LAB;  Service: Cardiology;  Laterality: N/A;  VT, DFTs, MDT CRTD in situ, LVAD, LASHELL pizarro, 3098    TREATMENT OF CARDIAC ARRHYTHMIA  10/18/2019    Procedure: Cardioversion or Defibrillation;  Surgeon: Raz Wagner MD;  Location: [facility] EP LAB;  Service: Cardiology;;     Family History   Problem Relation Age of Onset    Hypertension Father     Diabetes Father     Coronary artery disease Father     Heart disease Father         CHF    No Known Problems Mother     Cancer Sister 54        breast CA    No Known Problems Brother      Social History     Tobacco Use    Smoking status: Former Smoker     Packs/day: 1.00     Years: 31.00     Pack years: 31.00     Types: Cigarettes     Last attempt to quit: 2018     Years since quittin.7    Smokeless tobacco: Never Used    Tobacco comment: pt is quiting on his own - pt stated not qualified for program;  pt  quit on his own   Substance Use Topics    Alcohol use: No     Alcohol/week: 0.0 standard drinks     Comment: one fifth of gin or rum/week    Drug use: No     Review of Systems  General: Denies fever.  Denies chills.  Reports generalized weakness during the episodes.  HENT: Denies sore throat.  Denies earache.  Eyes: Denies visual changes.   Cardiovascular:  Reports intermittent chest pain.  Denies shortness of breath.  Denies orthopnea.  Denies dyspnea on  exertion.  Respiratory: Denies shortness of breath.  Denies wheezing.  Denies coughing.  GI: Denies abdominal pain.  Reports intermittent nausea.  Denies vomiting.  Denies diarrhea.  Denies constipation.  Denies melena.  Denies hematochezia.  : Denies dysuria.  Denies hematuria.    Skin: Denies rashes.   Neuro: Denies headache.  Denies head trauma.  Denies numbness.  Denies focal weakness.  Musculoskeletal: Denies neck pain.  Denies back pain.  Denies extremity pain.        Physical Exam     Initial Vitals   BP Pulse Resp Temp SpO2   10/28/19 1428 10/28/19 1320 10/28/19 1320 10/28/19 1320 10/28/19 1427   (!) 98/58 88 18 99.1 °F (37.3 °C) 98 %      MAP       --                Physical Exam  General: No apparent distress.  Well-nourished.  Well-developed.  Alert and oriented x3.  HENT: Moist mucous membranes.  Normocephalic atraumatic.  Oropharynx clear.  Tympanic membranes clear.  Eyes: Pupils equally round and reactive to light.  Extraocular movements intact.  No scleral icterus.  No conjunctival pallor.  Cardiovascular:  Machine like sounds auscultated.  Palpable distal pulses.  Respiratory: Clear to auscultation bilaterally.  No wheezes, rales, or rhonchi.  No respiratory distress.  Auscultation is difficult secondary to the LVAD.  Abdomen: Soft.  Nontender.  Nondistended.  No guarding.  No rebound.    Skin: No rashes.  No lesions.  No pallor.  No jaundice.  Neuro: Cranial nerves II through XII grossly intact.  Moving all extremities equally.  No sensory deficits.  Strength 5 out of 5 in all 4 extremities.  Musculoskeletal: Neck supple.  No extremity tenderness.  Moving all extremities without pain.  No back tenderness.  No neck tenderness.  Trace pitting edema noted bilaterally. Negative Homans sign. No palpable cord.  No calf tenderness.      ED Course   Procedures  Labs Reviewed   CBC W/ AUTO DIFFERENTIAL - Abnormal; Notable for the following components:       Result Value    Hemoglobin 13.3 (*)     Mean  Corpuscular Hemoglobin 25.7 (*)     Mean Corpuscular Hemoglobin Conc 31.1 (*)     Lymph # 0.8 (*)     Lymph% 17.1 (*)     Mono% 15.3 (*)     All other components within normal limits   COMPREHENSIVE METABOLIC PANEL - Abnormal; Notable for the following components:    Creatinine 2.0 (*)     Anion Gap 7 (*)     eGFR if  43.1 (*)     eGFR if non  37.3 (*)     All other components within normal limits   PROTIME-INR - Abnormal; Notable for the following components:    Prothrombin Time 27.5 (*)     INR 2.8 (*)     All other components within normal limits   TSH - Abnormal; Notable for the following components:    TSH <0.010 (*)     All other components within normal limits   TROPONIN I - Abnormal; Notable for the following components:    Troponin I 0.052 (*)     All other components within normal limits   B-TYPE NATRIURETIC PEPTIDE - Abnormal; Notable for the following components:     (*)     All other components within normal limits   T4, FREE - Abnormal; Notable for the following components:    Free T4 3.50 (*)     All other components within normal limits   MAGNESIUM     EKG Readings: (Independently Interpreted)   I interpreted this EKG myself.  Ventricularly paced rhythm.  PVC noted. No change when compared to previous.     ECG Results          EKG 12-lead (Final result)  Result time 10/28/19 14:21:58    Final result by Interface, Lab In [facility] (10/28/19 14:21:58)                 Narrative:    Test Reason : R00.2,    Vent. Rate : 092 BPM     Atrial Rate : 092 BPM     P-R Int : 222 ms          QRS Dur : 172 ms      QT Int : 404 ms       P-R-T Axes : 059 108 -70 degrees     QTc Int : 499 ms    Sinus rhythm with 1st degree A-V block with occasional Premature  ventricular complexes and Fusion complexes  Rightward axis  Left bundle branch block  Abnormal ECG  When compared with ECG of 25-OCT-2019 07:20,  Fusion complexes are now Present  Premature ventricular complexes are now  Present  Confirmed by MANN MITTAL MD ([id number]) on 10/28/2019 2:21:53 PM    Referred By: [email or web address]   SELF           Confirmed By:MANN MITTAL MD                            Imaging Results          X-Ray Chest AP Portable (Final result)  Result time 10/28/19 14:44:53    Final result by Casper Muñoz MD (10/28/19 14:44:53)                 Impression:      See above      Electronically signed by: Casper Muñoz MD  Date:    10/28/2019  Time:    14:44             Narrative:    EXAMINATION:  XR CHEST AP PORTABLE    CLINICAL HISTORY:  Chest Pain;    TECHNIQUE:  Single frontal view of the chest was performed.    COMPARISON:  None 10/25/2019    FINDINGS:  Postoperative changes, pacemaker and LVAD as before.  Cardiomegaly.  The lungs are clear.  No significant pleural effusion                              X-Rays:   Independently Interpreted Readings:   Chest X-Ray: I reviewed this chest x-ray myself. Postoperative changes, pacemaker, and cardiomegaly unchanged from previous. No obvious effusion or infiltrate noted.     Medical Decision Making:   Initial Assessment:   This is an emergent evaluation of a potentially critically ill patient.  The patient does have significant comorbidities and presents to the emergency department with intermittent episodes of palpitations, chest discomfort, and nausea.  He was sent in by his heart transplant coordinator.  Laboratory studies, EKG, chest x-ray, and a heart transplant service consultation have been ordered.  I will reassess.  ED Management:  2:48 p.m.  I discussed this case with the heart transplant service physician.  They have agreed to evaluate the patient here in the emergency department.  Laboratory studies and chest x-ray are pending.    3:12 p.m.  The heart transplant cardiologist is at the bedside evaluating the patient now.    4:02 p.m.  The patient's laboratory studies are unchanged from previous.  Despite having renal insufficiency, a mildly elevated troponin, and a mildly  elevated BNP, these are unchanged when compared to previous laboratory studies.  The patient's free T4 is mildly elevated with a low TSH.  The heart transplant service is aware of this.  Disposition is pending their recommendations.    5:12 p.m.  Heart transplant cardiology has completed their workup.  They have changed the settings on the patient's hardware.  They would like to have the patient discharged with follow-up in clinic.  They also are requesting that an outpatient referral to endocrinology be entered on their behalf.  The patient is resting comfortably at this time.  He will be discharged now.                      Clinical Impression:     1. Palpitations    2. LVAD (left ventricular assist device) present            Disposition:   Condition: Fair                        Felipe Delacruz MD  10/28/19 7615

## 2019-10-28 NOTE — ED NOTES
Patient identifiers verified and correct for Tim Richards.    LOC: The patient is awake, alert and aware of environment with an appropriate affect, the patient is oriented x 3 and speaking appropriately.    APPEARANCE: Patient resting comfortably and in no acute distress, patient is clean and well groomed, patient's clothing is properly fastened.    SKIN: The skin is warm and dry, color consistent with ethnicity, patient has normal skin turgor and moist mucus membranes  MUSCULOSKELETAL: Patient moving all extremities spontaneously, no obvious swelling or deformities noted.  RESPIRATORY: Airway is open and patent, respirations are spontaneous, patient has a normal effort and rate, no accessory muscle use noted, bilateral breath sounds clear  CARDIAC:, no periphreal edema noted, capillary refill < 3 seconds. LVAD  ABDOMEN: Soft and non tender to palpation, no distention noted, normoactive bowel sounds present in all four quadrants.  NEUROLOGIC: PERRLA, 3 mm bilaterally, eyes open spontaneously, behavior appropriate to situation, follows commands, facial expression symmetrical, bilateral hand grasp equal and even, purposeful motor response noted, normal sensation in all extremities when touched with a finger.

## 2019-10-28 NOTE — PROGRESS NOTES
Phone call received from Kellysean Richards. Patient's wife stating Mr. Richards has been having palpitations, feeling lightheaded, which she stated are similar feelings he felt prior to when he received HV therapy this past weekend. Advised wife that patient needs to come to ER to be evaluated. Kelly stated he will not want to come due to co-pay of $150.00. Advised Kelly that patient needs to be seen in the ER especially with those reported symptoms. Kelly stated she will try and persuade him to come to the ER. Clinic phone number given to Kelly.

## 2019-10-28 NOTE — ED TRIAGE NOTES
"Tim Richards, a 53 y.o. male presents to the ED w/ complaint of     LVAD patient, Patient had some changes to the AICD, the pacing function was turned off per patient"  For the last week/ over the weekend " I have felt my heart skip beats"    Patient has chest pain, left lower chest. The chest pain is non radiating     Patient also endorses some shortness of breath    Triage note:  Chief Complaint   Patient presents with    Palpitations     LVAD, denies numbness at this time, aicd, alicia Davila lvad coord notified pt in er     Review of patient's allergies indicates:   Allergen Reactions    Lisinopril Anaphylaxis     Past Medical History:   Diagnosis Date    CHF (congestive heart failure)     Dilated cardiomyopathy 1/10/2018    Disorder of kidney and ureter     CKD    Gout     HTN (hypertension)     Ventricular tachycardia (paroxysmal)        "

## 2019-10-28 NOTE — ED NOTES
Hourly rounding complete. Pt resting comfortably, NAD. Pt updated on plan of care. Pain currently 0/10. Comfort, positioning, and restroom needs addressed. Bed locked in lowest position, side rails up x2, call button within reach. Will continue to monitor.

## 2019-10-29 ENCOUNTER — ANTI-COAG VISIT (OUTPATIENT)
Dept: CARDIOLOGY | Facility: CLINIC | Age: 54
End: 2019-10-29
Payer: COMMERCIAL

## 2019-10-29 DIAGNOSIS — Z95.811 LVAD (LEFT VENTRICULAR ASSIST DEVICE) PRESENT: ICD-10-CM

## 2019-10-29 DIAGNOSIS — Z95.811 LVAD (LEFT VENTRICULAR ASSIST DEVICE) PRESENT: Primary | ICD-10-CM

## 2019-10-29 DIAGNOSIS — Z79.01 LONG TERM (CURRENT) USE OF ANTICOAGULANTS: ICD-10-CM

## 2019-10-29 PROCEDURE — 93793 ANTICOAG MGMT PT WARFARIN: CPT | Mod: S$GLB,,,

## 2019-10-29 PROCEDURE — 93793 PR ANTICOAGULANT MGMT FOR PT TAKING WARFARIN: ICD-10-PCS | Mod: S$GLB,,,

## 2019-10-29 NOTE — CONSULTS
[facility]  Cardiology  Consult Note    Patient Name: Tim Richards  MRN: [id number]  Admission Date: 10/28/2019  Hospital Length of Stay: 0 days  Code Status: Prior   Attending Provider: No att. providers found   Consulting Provider: Yayo Vasquez MD  Primary Care Physician: Ja Méndez MD  Principal Problem:<principal problem not specified>    Patient information was obtained from patient and past medical records.     Consults  Subjective:     Chief Complaint:  Palpitations     HPI:   52 yo AAM with s/p HM 2 at 51827(3/8/18 as DT with repositioning of outflow graft 3/9/18), BiV [employer]onic AICD, Hx of VT, GIB 7/2019, HTN, CKDIII presenting with palpitations for last 3 days. He was recently seen in ED (10/25/2019) when he presented with single ICD shock(appropriate). His Amiodarone was increased to 400 daily. He denies any more ICD shocks. He does report feeling fatigued since his CRT device was setting were changed earlier this month. Palpitations happen multiple times during the day and last for few second and resolve on their own. No relation with exertion. He felt today his device was about to shock him however it didn't. No LOC.     He was recently admitted with a prolonged VT/VF episode on 10/12, found to be in decompensated HF with weight up ~20 lbs. ICD interrogation showed MMVT , unsuccessful ATP x3, unsuccssful DCCV x1, unsuccessful ATP burst x2, degenerated into VF with DCCV x5 unsuccessful. In ER, he was still found to be in VF and underwent successful unsynchronized external 120V DCCV. He was reloaded with amiodarone and diuresed to his dry weight (278 lbs to 262 lbs). Speed increased to [address]. He had symptomatic AIVR during admission, briefly on IV lidocaine. After IV amio loading, amio dropped back to previous dose 200 mg daily. He underwent successful DFT with EP prior to discharge and minor device settings changes were made (see procedure note); unsuccessful  shocks suspected due to increased threshold in setting of ADHF, amiodarone increasing defibrillatory tissue threshold or change in heart shock vector post LVAD implant. BiV pacing turned off as well.       Past Medical History:   Diagnosis Date    CHF (congestive heart failure)     Dilated cardiomyopathy 1/10/2018    Disorder of kidney and ureter     CKD    Gout     HTN (hypertension)     Ventricular tachycardia (paroxysmal)        Past Surgical History:   Procedure Laterality Date    CARDIAC CATHETERIZATION  Dec. 2012    CARDIAC DEFIBRILLATOR PLACEMENT Left     CRRT-D    COLONOSCOPY N/A 3/6/2018    Procedure: COLONOSCOPY;  Surgeon: Alonso Bone MD;  Location: [facility] (2ND FLR);  Service: Endoscopy;  Laterality: N/A;    COLONOSCOPY N/A 7/17/2019    Procedure: COLONOSCOPY;  Surgeon: Blane Valdez MD;  Location: [facility] ENDO (2ND FLR);  Service: Endoscopy;  Laterality: N/A;    COLONOSCOPY N/A 7/18/2019    Procedure: COLONOSCOPY;  Surgeon: Blane Valdez MD;  Location: [facility] ENDO (2ND FLR);  Service: Endoscopy;  Laterality: N/A;    ESOPHAGOGASTRODUODENOSCOPY N/A 7/17/2019    Procedure: EGD (ESOPHAGOGASTRODUODENOSCOPY);  Surgeon: Blane Valdez MD;  Location: [facility] (2ND FLR);  Service: Endoscopy;  Laterality: N/A;    ESOPHAGOGASTRODUODENOSCOPY N/A 7/18/2019    Procedure: EGD (ESOPHAGOGASTRODUODENOSCOPY);  Surgeon: Blane Valdez MD;  Location: [facility] (2ND FLR);  Service: Endoscopy;  Laterality: N/A;    NONINVASIVE CARDIAC ELECTROPHYSIOLOGY STUDY N/A 10/18/2019    Procedure: CARDIAC ELECTROPHYSIOLOGY STUDY, NONINVASIVE;  Surgeon: Raz Wagner MD;  Location: [facility] EP LAB;  Service: Cardiology;  Laterality: N/A;  VT, DFTs, MDT CRTD in situ, LVAD, LASHELL pizarro, 3098    TREATMENT OF CARDIAC ARRHYTHMIA  10/18/2019    Procedure: Cardioversion or Defibrillation;  Surgeon: Raz Wagner MD;  Location: [facility] EP LAB;  Service: Cardiology;;       Review of patient's allergies indicates:   Allergen Reactions     Lisinopril Anaphylaxis       No current facility-administered medications on file prior to encounter.      Current Outpatient Medications on File Prior to Encounter   Medication Sig    allopurinol (ZYLOPRIM) 100 MG tablet TAKE 1 TABLET BY MOUTH EVERY DAY    amiodarone (PACERONE) 400 MG tablet Take 1 tablet (400 mg total) by mouth once daily.    amLODIPine (NORVASC) 10 MG tablet Take 1 tablet (10 mg total) by mouth once daily.    doxazosin (CARDURA) 8 MG Tab Take 1 tablet (8 mg total) by mouth every 12 (twelve) hours.    ferrous gluconate (FERGON) 324 MG tablet Take 1 tablet (324 mg total) by mouth daily with breakfast.    magnesium oxide (MAG-OX) 400 mg (241.3 mg magnesium) tablet TAKE 1 TABLET (400 MG TOTAL) BY MOUTH 3 (THREE) TIMES DAILY.    mesalamine (PENTASA) 250 mg CpSR Take 4 capsules (1,000 mg total) by mouth 4 (four) times daily.    pantoprazole (PROTONIX) 40 MG tablet TAKE 1 TABLET (40 MG TOTAL) BY MOUTH ONCE DAILY.    potassium chloride (K-TAB) 20 mEq Take 1 tablet (20 mEq total) by mouth 2 (two) times daily.    sildenafil (VIAGRA) 100 MG tablet Take 1 tablet (100 mg total) by mouth daily as needed for Erectile Dysfunction.    spironolactone (ALDACTONE) 25 MG tablet Take 1 tablet (25 mg total) by mouth once daily.    torsemide (DEMADEX) 20 MG Tab Take 40 mg (2 tabs) qam and 20 mg (1 tab) qpm for 3 days then 40 mg (2 tabs) daily.    warfarin (COUMADIN) 5 MG tablet Take 1.5 tabs by mouth on  only, followed by weekly dose of 1 tablet daily, except 1.5 tabs on Mon, Wed, Fri     Family History     Problem Relation (Age of Onset)    Cancer Sister (54)    Coronary artery disease Father    Diabetes Father    Heart disease Father    Hypertension Father    No Known Problems Mother, Brother        Tobacco Use    Smoking status: Former Smoker     Packs/day: 1.00     Years: 31.00     Pack years: 31.00     Types: Cigarettes     Last attempt to quit: 2018     Years since quittin.7     Smokeless tobacco: Never Used    Tobacco comment: pt is quiting on his own - pt stated not qualified for program; 2/16 pt  quit on his own   Substance and Sexual Activity    Alcohol use: No     Alcohol/week: 0.0 standard drinks     Comment: one fifth of gin or rum/week    Drug use: No    Sexual activity: Yes     Partners: Female     Birth control/protection: None     Comment: 10/5/17  with same partner 7 years      Review of Systems   All other systems reviewed and are negative.    Objective:     Vital Signs (Most Recent):  Temp: 99.1 °F (37.3 °C) (10/28/19 1320)  Pulse: 80 (10/28/19 1731)  Resp: 18 (10/28/19 1731)  BP: (!) 88/0 (10/28/19 1731)  SpO2: 98 % (10/28/19 1731) Vital Signs (24h Range):  Temp:  [99.1 °F (37.3 °C)] 99.1 °F (37.3 °C)  Pulse:  [80-88] 80  Resp:  [17-20] 18  SpO2:  [97 %-100 %] 98 %  BP: (86-98)/(0-58) 88/0     Weight: 119.7 kg (264 lb)  Body mass index is 34.83 kg/m².    SpO2: 98 %       No intake or output data in the 24 hours ending 10/28/19 1934    Lines/Drains/Airways     Line                 VAD 03/08/18 1124 Left ventricular assist device HeartMate  days                Physical Exam  General: alert, awake and oriented x 3  Eyes:PERRL.   Neck:no JVD   Lungs:  clear to auscultation bilaterally   Cardiovascular: Heart: regular rate and rhythm, S1, S2 normal, no murmur, click, rub or gallop.   Chest Wall: no tenderness.   Pulses-2+ radial, femoral, DP, PT b/l  Extremities: no cyanosis or edema.   Abdomen/Rectal: Abdomen: soft, non-tender non-distented; bowel sounds normal  Neurologic: Normal strength and tone. No focal numbness or weakness  Significant Labs:   CMP   Recent Labs   Lab 10/28/19  1421      K 4.3      CO2 27   GLU 83   BUN 19   CREATININE 2.0*   CALCIUM 10.2   PROT 8.3   ALBUMIN 4.0   BILITOT 0.6   ALKPHOS 117   AST 32   ALT 40   ANIONGAP 7*   ESTGFRAFRICA 43.1*   EGFRNONAA 37.3*   , CBC   Recent Labs   Lab 10/28/19  1421   WBC 4.90   HGB 13.3*    HCT 42.7       and INR   Recent Labs   Lab 10/28/19  1421   INR 2.8*       Significant Imaging: Echocardiogram:   2D echo with color flow doppler:   Results for orders placed or performed during the hospital encounter of 07/18/18   2D Echo w/ Color Flow Doppler   Result Value Ref Range    QEF 15 (A) 55 - 65    Mitral Valve Regurgitation SEVERE (A)     Est. PA Systolic Pressure 33.6     Mitral Valve Mobility NORMAL     Tricuspid Valve Regurgitation MILD     Narrative    Date of Procedure: 07/18/2018        TEST DESCRIPTION   Technical Quality: This is a technically challenging study. There is poor endocardial definition.     General: A catheter is present in the right-sided cardiac chambers.     Aorta: The aortic root is normal in size, measuring 2.1 cm at sinotubular junction and 3.4 cm at Sinuses of Valsalva. The proximal ascending aorta is normal in size, measuring 3.6 cm across.     Left Atrium: The left atrial volume index is severely enlarged, measuring 73.67 cc/m2.     Left Ventricle: The left ventricle is severely enlarged, with an end-diastolic diameter of 8.3 cm, and an end-systolic diameter of 7.7 cm. LV wall thickness is normal, with the septum and the posterior wall each measuring 0.8 cm across. Relative wall   thickness was normal at 0.19, and the LV mass index was increased at 172.0 g/m2 consistent with eccentric left ventricular hypertrophy. There is global hypokinesis. Left ventricular systolic function appears severely depressed. Visually estimated   ejection fraction is 15-20%.         Right Atrium: The right atrium is enlarged, measuring 6.8 cm in length and 5.1 cm in width in the apical view.     Right Ventricle: The right ventricle is normal in size measuring 3.4 cm at the base in the apical right ventricle-focused view. Global right ventricular systolic function appears moderately depressed. There is abnormal septal motion. Tricuspid annular   plane systolic excursion (TAPSE) is 1.2  cm. The estimated PA systolic pressure is 34 mmHg.     Aortic Valve:  The aortic valve is normal in structure.     Mitral Valve:  The mitral valve is normal in structure with normal leaflet mobility. There is severe mitral regurgitation.     Tricuspid Valve:  The tricuspid valve is normal in structure with normal leaflet mobility. There is mild tricuspid regurgitation.     Pulmonary Valve:  The pulmonic valve is normal in structure with normal leaflet mobility. There is trivial to mild pulmonic regurgitation.     IVC: IVC is enlarged but collapses > 50% with a sniff, suggesting intermediate right atrial pressure of 8 mmHg.     Intracavitary: There is no evidence of pericardial effusion, intracavity mass, thrombi, or vegetation.     Other: There is a left pleural effusion present.       LVAD: There is a HeartMate II LVAD in place. Inflow cannula is visualized. Outflow graft is not visualized. Baseline speed is 9400 rpms. The aortic valve opens intermittently. Septum is midline.       CONCLUSIONS     1 - Severely depressed left ventricular systolic function (EF 15-20%).     2 - Eccentric hypertrophy.     3 - Biatrial enlargement.     4 - Moderately depressed right ventricular systolic function .     5 - The estimated PA systolic pressure is 34 mmHg.     6 - Severe mitral regurgitation.     7 - Mild tricuspid regurgitation.     8 - Trivial to mild pulmonic regurgitation.     9 - Intermediate central venous pressure.     10 - Left pleural effusion.     11 - HeartMate II LVAD; speed 9400.             This document has been electronically    SIGNED BY: Magi Delong MD On: 07/18/2018 17:10    and Transthoracic echo (TTE) complete (Cupid Only):   Results for orders placed or performed during the hospital encounter of 10/12/19   Echo Color Flow Doppler? Yes; Bubble Contrast? Yes   Result Value Ref Range    IVS 0.86 0.6 - 1.1 cm    LA size 3.86 cm    Left Atrium Major Axis 6.22 cm    Left Atrium Minor Axis 6.29 cm     LVIDD 9.00 (A) 3.5 - 6.0 cm    LVIDS 8.72 (A) 2.1 - 4.0 cm    LVOT diameter 2.16 cm    PW 1.05 0.6 - 1.1 cm    MV Peak A Jorge 0.91 m/s    E wave decelartion time 230.26 msec    MV Peak E Jorge 0.70 m/s    RA Major Axis 5.82 cm    RA Width 4.92 cm    Sinus 3.45 cm    TAPSE 1.60 cm    TR Max Jorge 2.57 m/s    TDI LATERAL 0.02 m/s    LA WIDTH 3.65 cm    LV Diastolic Volume 441.12 mL    LV Systolic Volume 417.56 mL    LV LATERAL E/E' RATIO 35.00 m/s    FS 3 %    LA volume 74.91 cm3    LV mass 474.50 g    Left Ventricle Relative Wall Thickness 0.23 cm    E/A ratio 0.77     LVOT area 3.7 cm2    LV Systolic Volume Index 173.6 mL/m2    LV Diastolic Volume Index 183.37 mL/m2    LA Volume Index 31.1 mL/m2    LV Mass Index 197 g/m2    Triscuspid Valve Regurgitation Peak Gradient 26 mmHg    BSA 2.46 m2    Right Atrial Pressure (from IVC) 8 mmHg    TV rest pulmonary artery pressure 34 mmHg    RVDD 4.90 cm    Narrative    · Eccentric left ventricular hypertrophy. Severely decreased left   ventricular systolic function. The estimated ejection fraction is 10%  · Grade I (mild) left ventricular diastolic dysfunction consistent with   impaired relaxation.  · Severe left ventricular enlargement. LVEDD 9 cm  · LVAD present. Base speed is 69027 RPMs. The pump type is a Heartmate II.   Unable to see if AV opens. The septum is midline  · Mild-to-moderate mitral regurgitation.  · Mild tricuspid regurgitation.  · Intermediate central venous pressure (8 mm Hg).  · The estimated PA systolic pressure is 34 mm Hg        Assessment and Plan:     Palpitations  ICD interrogation reveals no episode of VT/VF since 10/25/2019  No episodes recorded on Tele in ED-Patient did have some occasional PVCs that were symptomatic.  Device setting changed to add a monitor only zone with rate between 133-167 to detect slower VT in setting of increased amiodarone dose.  TSH noted to very low-recommend endocrine consult as outpatient as might be causing palpitations  and fatigue  Ok to discharge from HTS and EP perspective    Discussed with Dr Rhodes and Dr Bell.        VTE Risk Mitigation (From admission, onward)    None          Thank you for your consult.     Yayo Vasquez MD  Cardiology   [facility]

## 2019-10-29 NOTE — SUBJECTIVE & OBJECTIVE
Past Medical History:   Diagnosis Date    CHF (congestive heart failure)     Dilated cardiomyopathy 1/10/2018    Disorder of kidney and ureter     CKD    Gout     HTN (hypertension)     Ventricular tachycardia (paroxysmal)        Past Surgical History:   Procedure Laterality Date    CARDIAC CATHETERIZATION  Dec. 2012    CARDIAC DEFIBRILLATOR PLACEMENT Left     CRRT-D    COLONOSCOPY N/A 3/6/2018    Procedure: COLONOSCOPY;  Surgeon: Alonso Bone MD;  Location: [facility] ENDO (2ND FLR);  Service: Endoscopy;  Laterality: N/A;    COLONOSCOPY N/A 7/17/2019    Procedure: COLONOSCOPY;  Surgeon: Blane Valdez MD;  Location: [facility] ENDO (2ND FLR);  Service: Endoscopy;  Laterality: N/A;    COLONOSCOPY N/A 7/18/2019    Procedure: COLONOSCOPY;  Surgeon: Blane Valdez MD;  Location: [facility] ENDO (2ND FLR);  Service: Endoscopy;  Laterality: N/A;    ESOPHAGOGASTRODUODENOSCOPY N/A 7/17/2019    Procedure: EGD (ESOPHAGOGASTRODUODENOSCOPY);  Surgeon: Blane Valdez MD;  Location: [facility] (2ND FLR);  Service: Endoscopy;  Laterality: N/A;    ESOPHAGOGASTRODUODENOSCOPY N/A 7/18/2019    Procedure: EGD (ESOPHAGOGASTRODUODENOSCOPY);  Surgeon: Blane Valdez MD;  Location: [facility] (2ND FLR);  Service: Endoscopy;  Laterality: N/A;    NONINVASIVE CARDIAC ELECTROPHYSIOLOGY STUDY N/A 10/18/2019    Procedure: CARDIAC ELECTROPHYSIOLOGY STUDY, NONINVASIVE;  Surgeon: Raz Wagner MD;  Location: [facility] EP LAB;  Service: Cardiology;  Laterality: N/A;  VT, DFTs, MDT CRTD in situ, LVAD, LASHELL pizarro, 3098    TREATMENT OF CARDIAC ARRHYTHMIA  10/18/2019    Procedure: Cardioversion or Defibrillation;  Surgeon: Raz Wagner MD;  Location: [facility] EP LAB;  Service: Cardiology;;       Review of patient's allergies indicates:   Allergen Reactions    Lisinopril Anaphylaxis       No current facility-administered medications on file prior to encounter.      Current Outpatient Medications on File Prior to Encounter   Medication Sig    allopurinol (ZYLOPRIM) 100  MG tablet TAKE 1 TABLET BY MOUTH EVERY DAY    amiodarone (PACERONE) 400 MG tablet Take 1 tablet (400 mg total) by mouth once daily.    amLODIPine (NORVASC) 10 MG tablet Take 1 tablet (10 mg total) by mouth once daily.    doxazosin (CARDURA) 8 MG Tab Take 1 tablet (8 mg total) by mouth every 12 (twelve) hours.    ferrous gluconate (FERGON) 324 MG tablet Take 1 tablet (324 mg total) by mouth daily with breakfast.    magnesium oxide (MAG-OX) 400 mg (241.3 mg magnesium) tablet TAKE 1 TABLET (400 MG TOTAL) BY MOUTH 3 (THREE) TIMES DAILY.    mesalamine (PENTASA) 250 mg CpSR Take 4 capsules (1,000 mg total) by mouth 4 (four) times daily.    pantoprazole (PROTONIX) 40 MG tablet TAKE 1 TABLET (40 MG TOTAL) BY MOUTH ONCE DAILY.    potassium chloride (K-TAB) 20 mEq Take 1 tablet (20 mEq total) by mouth 2 (two) times daily.    sildenafil (VIAGRA) 100 MG tablet Take 1 tablet (100 mg total) by mouth daily as needed for Erectile Dysfunction.    spironolactone (ALDACTONE) 25 MG tablet Take 1 tablet (25 mg total) by mouth once daily.    torsemide (DEMADEX) 20 MG Tab Take 40 mg (2 tabs) qam and 20 mg (1 tab) qpm for 3 days then 40 mg (2 tabs) daily.    warfarin (COUMADIN) 5 MG tablet Take 1.5 tabs by mouth on  only, followed by weekly dose of 1 tablet daily, except 1.5 tabs on Mon, Wed, Fri     Family History     Problem Relation (Age of Onset)    Cancer Sister (54)    Coronary artery disease Father    Diabetes Father    Heart disease Father    Hypertension Father    No Known Problems Mother, Brother        Tobacco Use    Smoking status: Former Smoker     Packs/day: 1.00     Years: 31.00     Pack years: 31.00     Types: Cigarettes     Last attempt to quit: 2018     Years since quittin.7    Smokeless tobacco: Never Used    Tobacco comment: pt is quiting on his own - pt stated not qualified for program;  pt  quit on his own   Substance and Sexual Activity    Alcohol use: No     Alcohol/week: 0.0  standard drinks     Comment: one fifth of gin or rum/week    Drug use: No    Sexual activity: Yes     Partners: Female     Birth control/protection: None     Comment: 10/5/17  with same partner 7 years      Review of Systems   All other systems reviewed and are negative.    Objective:     Vital Signs (Most Recent):  Temp: 99.1 °F (37.3 °C) (10/28/19 1320)  Pulse: 80 (10/28/19 1731)  Resp: 18 (10/28/19 1731)  BP: (!) 88/0 (10/28/19 1731)  SpO2: 98 % (10/28/19 1731) Vital Signs (24h Range):  Temp:  [99.1 °F (37.3 °C)] 99.1 °F (37.3 °C)  Pulse:  [80-88] 80  Resp:  [17-20] 18  SpO2:  [97 %-100 %] 98 %  BP: (86-98)/(0-58) 88/0     Weight: 119.7 kg (264 lb)  Body mass index is 34.83 kg/m².    SpO2: 98 %       No intake or output data in the 24 hours ending 10/28/19 1934    Lines/Drains/Airways     Line                 VAD 03/08/18 1124 Left ventricular assist device HeartMate  days                Physical Exam  General: alert, awake and oriented x 3  Eyes:PERRL.   Neck:no JVD   Lungs:  clear to auscultation bilaterally   Cardiovascular: Heart: regular rate and rhythm, S1, S2 normal, no murmur, click, rub or gallop.   Chest Wall: no tenderness.   Pulses-2+ radial, femoral, DP, PT b/l  Extremities: no cyanosis or edema.   Abdomen/Rectal: Abdomen: soft, non-tender non-distented; bowel sounds normal  Neurologic: Normal strength and tone. No focal numbness or weakness  Significant Labs:   CMP   Recent Labs   Lab 10/28/19  1421      K 4.3      CO2 27   GLU 83   BUN 19   CREATININE 2.0*   CALCIUM 10.2   PROT 8.3   ALBUMIN 4.0   BILITOT 0.6   ALKPHOS 117   AST 32   ALT 40   ANIONGAP 7*   ESTGFRAFRICA 43.1*   EGFRNONAA 37.3*   , CBC   Recent Labs   Lab 10/28/19  1421   WBC 4.90   HGB 13.3*   HCT 42.7       and INR   Recent Labs   Lab 10/28/19  1421   INR 2.8*       Significant Imaging: Echocardiogram:   2D echo with color flow doppler:   Results for orders placed or performed during the hospital  encounter of 07/18/18   2D Echo w/ Color Flow Doppler   Result Value Ref Range    QEF 15 (A) 55 - 65    Mitral Valve Regurgitation SEVERE (A)     Est. PA Systolic Pressure 33.6     Mitral Valve Mobility NORMAL     Tricuspid Valve Regurgitation MILD     Narrative    Date of Procedure: 07/18/2018        TEST DESCRIPTION   Technical Quality: This is a technically challenging study. There is poor endocardial definition.     General: A catheter is present in the right-sided cardiac chambers.     Aorta: The aortic root is normal in size, measuring 2.1 cm at sinotubular junction and 3.4 cm at Sinuses of Valsalva. The proximal ascending aorta is normal in size, measuring 3.6 cm across.     Left Atrium: The left atrial volume index is severely enlarged, measuring 73.67 cc/m2.     Left Ventricle: The left ventricle is severely enlarged, with an end-diastolic diameter of 8.3 cm, and an end-systolic diameter of 7.7 cm. LV wall thickness is normal, with the septum and the posterior wall each measuring 0.8 cm across. Relative wall   thickness was normal at 0.19, and the LV mass index was increased at 172.0 g/m2 consistent with eccentric left ventricular hypertrophy. There is global hypokinesis. Left ventricular systolic function appears severely depressed. Visually estimated   ejection fraction is 15-20%.         Right Atrium: The right atrium is enlarged, measuring 6.8 cm in length and 5.1 cm in width in the apical view.     Right Ventricle: The right ventricle is normal in size measuring 3.4 cm at the base in the apical right ventricle-focused view. Global right ventricular systolic function appears moderately depressed. There is abnormal septal motion. Tricuspid annular   plane systolic excursion (TAPSE) is 1.2 cm. The estimated PA systolic pressure is 34 mmHg.     Aortic Valve:  The aortic valve is normal in structure.     Mitral Valve:  The mitral valve is normal in structure with normal leaflet mobility. There is severe  mitral regurgitation.     Tricuspid Valve:  The tricuspid valve is normal in structure with normal leaflet mobility. There is mild tricuspid regurgitation.     Pulmonary Valve:  The pulmonic valve is normal in structure with normal leaflet mobility. There is trivial to mild pulmonic regurgitation.     IVC: IVC is enlarged but collapses > 50% with a sniff, suggesting intermediate right atrial pressure of 8 mmHg.     Intracavitary: There is no evidence of pericardial effusion, intracavity mass, thrombi, or vegetation.     Other: There is a left pleural effusion present.       LVAD: There is a HeartMate II LVAD in place. Inflow cannula is visualized. Outflow graft is not visualized. Baseline speed is 9400 rpms. The aortic valve opens intermittently. Septum is midline.       CONCLUSIONS     1 - Severely depressed left ventricular systolic function (EF 15-20%).     2 - Eccentric hypertrophy.     3 - Biatrial enlargement.     4 - Moderately depressed right ventricular systolic function .     5 - The estimated PA systolic pressure is 34 mmHg.     6 - Severe mitral regurgitation.     7 - Mild tricuspid regurgitation.     8 - Trivial to mild pulmonic regurgitation.     9 - Intermediate central venous pressure.     10 - Left pleural effusion.     11 - HeartMate II LVAD; speed 9400.             This document has been electronically    SIGNED BY: Magi Delong MD On: 07/18/2018 17:10    and Transthoracic echo (TTE) complete (Cupid Only):   Results for orders placed or performed during the hospital encounter of 10/12/19   Echo Color Flow Doppler? Yes; Bubble Contrast? Yes   Result Value Ref Range    IVS 0.86 0.6 - 1.1 cm    LA size 3.86 cm    Left Atrium Major Axis 6.22 cm    Left Atrium Minor Axis 6.29 cm    LVIDD 9.00 (A) 3.5 - 6.0 cm    LVIDS 8.72 (A) 2.1 - 4.0 cm    LVOT diameter 2.16 cm    PW 1.05 0.6 - 1.1 cm    MV Peak A Jorge 0.91 m/s    E wave decelartion time 230.26 msec    MV Peak E Jorge 0.70 m/s    RA Major Axis  5.82 cm    RA Width 4.92 cm    Sinus 3.45 cm    TAPSE 1.60 cm    TR Max Jorge 2.57 m/s    TDI LATERAL 0.02 m/s    LA WIDTH 3.65 cm    LV Diastolic Volume 441.12 mL    LV Systolic Volume 417.56 mL    LV LATERAL E/E' RATIO 35.00 m/s    FS 3 %    LA volume 74.91 cm3    LV mass 474.50 g    Left Ventricle Relative Wall Thickness 0.23 cm    E/A ratio 0.77     LVOT area 3.7 cm2    LV Systolic Volume Index 173.6 mL/m2    LV Diastolic Volume Index 183.37 mL/m2    LA Volume Index 31.1 mL/m2    LV Mass Index 197 g/m2    Triscuspid Valve Regurgitation Peak Gradient 26 mmHg    BSA 2.46 m2    Right Atrial Pressure (from IVC) 8 mmHg    TV rest pulmonary artery pressure 34 mmHg    RVDD 4.90 cm    Narrative    · Eccentric left ventricular hypertrophy. Severely decreased left   ventricular systolic function. The estimated ejection fraction is 10%  · Grade I (mild) left ventricular diastolic dysfunction consistent with   impaired relaxation.  · Severe left ventricular enlargement. LVEDD 9 cm  · LVAD present. Base speed is 37926 RPMs. The pump type is a Heartmate II.   Unable to see if AV opens. The septum is midline  · Mild-to-moderate mitral regurgitation.  · Mild tricuspid regurgitation.  · Intermediate central venous pressure (8 mm Hg).  · The estimated PA systolic pressure is 34 mm Hg

## 2019-10-29 NOTE — ASSESSMENT & PLAN NOTE
ICD interrogation reveals no episode of VT/VF since 10/25/2019  No episodes recorded on Tele in ED-Patient did have some occasional PVCs that were symptomatic.  Device setting changed to add a monitor only zone with rate between 133-167 to detect slower VT in setting of increased amiodarone dose.  TSH noted to very low-recommend endocrine consult as outpatient as might be causing palpitations and fatigue  Ok to discharge from HTS and EP perspective    Discussed with Dr Rhodes and Dr Bell.

## 2019-10-29 NOTE — HPI
54 yo AAM with s/p HM 2 at 16084(3/8/18 as DT with repositioning of outflow graft 3/9/18), BiV Medtronic AICD, Hx of VT, GIB 7/2019, HTN, CKDIII presenting with palpitations for last 3 days. He was recently seen in ED (10/25/2019) when he presented with single ICD shock(appropriate). His Amiodarone was increased to 400 daily. He denies any more ICD shocks. He does report feeling fatigued since his CRT device was setting were changed earlier this month. Palpitations happen multiple times during the day and last for few second and resolve on their own. No relation with exertion. He felt today his device was about to shock him however it didn't. No LOC.     He was recently admitted with a prolonged VT/VF episode on 10/12, found to be in decompensated HF with weight up ~20 lbs. ICD interrogation showed MMVT , unsuccessful ATP x3, unsuccssful DCCV x1, unsuccessful ATP burst x2, degenerated into VF with DCCV x5 unsuccessful. In ER, he was still found to be in VF and underwent successful unsynchronized external 120V DCCV. He was reloaded with amiodarone and diuresed to his dry weight (278 lbs to 262 lbs). [place] increased to [address]. He had symptomatic AIVR during admission, briefly on IV lidocaine. After IV amio loading, amio dropped back to previous dose 200 mg daily. He underwent successful DFT with EP prior to discharge and minor device settings changes were made (see procedure note); unsuccessful shocks suspected due to increased threshold in setting of ADHF, amiodarone increasing defibrillatory tissue threshold or change in heart shock vector post LVAD implant. BiV pacing turned off as well.

## 2019-11-03 ENCOUNTER — PATIENT MESSAGE (OUTPATIENT)
Dept: TRANSPLANT | Facility: CLINIC | Age: 54
End: 2019-11-03

## 2019-11-05 ENCOUNTER — PATIENT MESSAGE (OUTPATIENT)
Dept: TRANSPLANT | Facility: CLINIC | Age: 54
End: 2019-11-05

## 2019-11-08 ENCOUNTER — HOSPITAL ENCOUNTER (OUTPATIENT)
Dept: CARDIOLOGY | Facility: CLINIC | Age: 54
Discharge: HOME | End: 2019-11-08
Attending: INTERNAL MEDICINE
Payer: COMMERCIAL

## 2019-11-08 ENCOUNTER — OFFICE VISIT (OUTPATIENT)
Dept: ENDOCRINOLOGY | Facility: CLINIC | Age: 54
End: 2019-11-08
Payer: COMMERCIAL

## 2019-11-08 ENCOUNTER — ANTI-COAG VISIT (OUTPATIENT)
Dept: CARDIOLOGY | Facility: CLINIC | Age: 54
End: 2019-11-08
Payer: COMMERCIAL

## 2019-11-08 VITALS — HEIGHT: 73 IN | RESPIRATION RATE: 18 BRPM | WEIGHT: 261.94 LBS | BODY MASS INDEX: 34.71 KG/M2

## 2019-11-08 VITALS — HEART RATE: 80 BPM | HEIGHT: 73 IN | BODY MASS INDEX: 34.72 KG/M2 | WEIGHT: 262 LBS

## 2019-11-08 DIAGNOSIS — Z79.01 LONG TERM (CURRENT) USE OF ANTICOAGULANTS: ICD-10-CM

## 2019-11-08 DIAGNOSIS — Z95.811 LVAD (LEFT VENTRICULAR ASSIST DEVICE) PRESENT: ICD-10-CM

## 2019-11-08 DIAGNOSIS — I47.20 VT (VENTRICULAR TACHYCARDIA): ICD-10-CM

## 2019-11-08 DIAGNOSIS — T46.2X5A AMIODARONE-INDUCED HYPERTHYROIDISM: Primary | ICD-10-CM

## 2019-11-08 DIAGNOSIS — E05.80 AMIODARONE-INDUCED HYPERTHYROIDISM: Primary | ICD-10-CM

## 2019-11-08 DIAGNOSIS — I42.0 DCM (DILATED CARDIOMYOPATHY): ICD-10-CM

## 2019-11-08 LAB
ASCENDING AORTA: 2.83 CM
BSA FOR ECHO PROCEDURE: 2.47 M2
CV ECHO LV RWT: 0.16 CM
DOP CALC LVOT AREA: 3.9 CM2
DOP CALC LVOT DIAMETER: 2.24 CM
E WAVE DECELERATION TIME: 124.12 MSEC
E/A RATIO: 1.12
E/E' RATIO: 11.06 M/S
ECHO LV POSTERIOR WALL: 0.9 CM (ref 0.6–1.1)
FRACTIONAL SHORTENING: 15 % (ref 28–44)
INTERVENTRICULAR SEPTUM: 1 CM (ref 0.6–1.1)
LA MAJOR: 7.08 CM
LA MINOR: 6.79 CM
LA WIDTH: 5.03 CM
LEFT ATRIUM SIZE: 4.56 CM
LEFT ATRIUM VOLUME INDEX: 56 ML/M2
LEFT ATRIUM VOLUME: 135.15 CM3
LEFT INTERNAL DIMENSION IN SYSTOLE: 9.31 CM (ref 2.1–4)
LEFT VENTRICLE DIASTOLIC VOLUME INDEX: 223.21 ML/M2
LEFT VENTRICLE DIASTOLIC VOLUME: 538.72 ML
LEFT VENTRICLE MASS INDEX: 281 G/M2
LEFT VENTRICLE SYSTOLIC VOLUME INDEX: 199.8 ML/M2
LEFT VENTRICLE SYSTOLIC VOLUME: 482.19 ML
LEFT VENTRICULAR INTERNAL DIMENSION IN DIASTOLE: 11 CM (ref 3.5–6)
LEFT VENTRICULAR MASS: 679.25 G
LV LATERAL E/E' RATIO: 13.43 M/S
LV SEPTAL E/E' RATIO: 9.4 M/S
MV PEAK A VEL: 0.84 M/S
MV PEAK E VEL: 0.94 M/S
PISA TR MAX VEL: 1.94 M/S
RA MAJOR: 5.36 CM
RA PRESSURE: 3 MMHG
RA WIDTH: 4.62 CM
RIGHT VENTRICULAR END-DIASTOLIC DIMENSION: 3.61 CM
SINUS: 3.37 CM
STJ: 3.9 CM
TDI LATERAL: 0.07 M/S
TDI SEPTAL: 0.1 M/S
TDI: 0.09 M/S
TR MAX PG: 15 MMHG
TRICUSPID ANNULAR PLANE SYSTOLIC EXCURSION: 1.68 CM
TV REST PULMONARY ARTERY PRESSURE: 18 MMHG

## 2019-11-08 PROCEDURE — 93306 ECHO (CUPID ONLY): ICD-10-PCS | Mod: S$GLB,,, | Performed by: INTERNAL MEDICINE

## 2019-11-08 PROCEDURE — 99214 OFFICE O/P EST MOD 30 MIN: CPT | Mod: S$GLB,,, | Performed by: INTERNAL MEDICINE

## 2019-11-08 PROCEDURE — 99999 PR PBB SHADOW E&M-EST. PATIENT-LVL III: ICD-10-PCS | Mod: PBBFAC,,, | Performed by: INTERNAL MEDICINE

## 2019-11-08 PROCEDURE — 93793 ANTICOAG MGMT PT WARFARIN: CPT | Mod: S$GLB,,,

## 2019-11-08 PROCEDURE — 99999 PR PBB SHADOW E&M-EST. PATIENT-LVL III: CPT | Mod: PBBFAC,,, | Performed by: INTERNAL MEDICINE

## 2019-11-08 PROCEDURE — 99214 PR OFFICE/OUTPT VISIT, EST, LEVL IV, 30-39 MIN: ICD-10-PCS | Mod: S$GLB,,, | Performed by: INTERNAL MEDICINE

## 2019-11-08 PROCEDURE — 93793 PR ANTICOAGULANT MGMT FOR PT TAKING WARFARIN: ICD-10-PCS | Mod: S$GLB,,,

## 2019-11-08 PROCEDURE — 93306 TTE W/DOPPLER COMPLETE: CPT | Mod: S$GLB,,, | Performed by: INTERNAL MEDICINE

## 2019-11-08 RX ORDER — METHIMAZOLE 10 MG/1
10 TABLET ORAL 2 TIMES DAILY
Qty: 60 TABLET | Refills: 1 | Status: SHIPPED | OUTPATIENT
Start: 2019-11-08 | End: 2019-11-15

## 2019-11-08 RX ORDER — PREDNISONE 20 MG/1
40 TABLET ORAL DAILY
Qty: 60 TABLET | Refills: 2 | Status: SHIPPED | OUTPATIENT
Start: 2019-11-08 | End: 2019-11-15

## 2019-11-08 NOTE — LETTER
November 8, 2019      Felipe Delacruz MD  [address] [facility] [place] [address]           [facility] - Endocrinology 6th [place]  [address]  [facility] [address]  Phone: [phone]  Fax: [phone]          Patient: Tim Richards   MR Number: [id number]   YOB: 1965   Date of Visit: 11/8/2019       Dear Dr. Felipe Delacruz:    Thank you for referring Tim Richards to me for evaluation. Attached you will find relevant portions of my assessment and plan of care.    If you have questions, please do not hesitate to call me. I look forward to following Tim Richards along with you.    Sincerely,    Harry Piedra MD    Enclosure  CC:  No Recipients    If you would like to receive this communication electronically, please contact [email or web address] or [phone] to request more information on [employer] access.    For providers and/or their staff who would like to refer a patient to [facility], please contact us through our one-stop-shop provider referral line, [facility], at [phone].    If you feel you have received this communication in error or would no longer like to receive these types of communications, please e-mail [email or web address]

## 2019-11-08 NOTE — NURSING
Patient identified by 2 identifiers. Denies previous reactions to blood transfusions, allergies reviewed & procedure explained.  22 g IV placed to Lt AC, flushed w/ 10cc NS pre & post contrast administration.  3cc Optison administered, echo images obtained.  Pt tolerated procedure well.  IV D/C'ed, preasure dsg applied.  Pt D/C'ed to home.

## 2019-11-08 NOTE — PROGRESS NOTES
Subjective:      Chief Complaint: Referral for hyperthyroidism    HPI: Tim Richards is a 53 y.o. male who is here for an initial evaluation for hyperthyroidism presumed 2/2 amiodarone.    Past Medical History:   Diagnosis Date    CHF (congestive heart failure)     Dilated cardiomyopathy 1/10/2018    Disorder of kidney and ureter     CKD    Gout     HTN (hypertension)     Ventricular tachycardia (paroxysmal)       Patient has a history of cardiomyopathy s/p LVAD who was admitted one month ago for recurrent VTach. His defibrillator shocked him 6 times, and he required extrernal defibrillation also. He was readmitted one week later for another episode, and since then he's noticed intermittent palpitations. Denies hyperactivity, heat intolerance, anxiety, tremor, diarrhea, or any other symptoms of hyperthyroidism. TSH checked in July was suppressed with normal free T4. He has been on amiodarone since 2012. The dose was recently increased from 200 > 400 mg daily a few weeks ago after the events above. He has no pre-existing thyroid disease.     Ref. Range 9/11/2015 11:40 12/22/2017 06:48 2/7/2018 16:56 3/14/2018 18:00 4/19/2018 09:40 11/7/2018 12:08 7/18/2019 04:00 10/28/2019 14:21   TSH 0.400 - 4.000 uIU/mL 1.750 4.295 (H) 5.740 (H) 4.911 (H) 4.477 (H) 4.039 (H) 0.065 (L) <0.010 (L)   T3, Free 2.3 - 4.2 pg/mL  2.2 (L)         T4 Total 4.5 - 11.5 ug/dL    6.5 8.0 7.3     Free T4 0.71 - 1.51 ng/dL  1.19 1.14 1.09 1.38 1.39 1.03 3.50 (H)     Reviewed past medical, family, social history and updated as appropriate.    Review of Systems   Constitutional: Positive for fatigue. Negative for unexpected weight change.   Eyes: Negative for visual disturbance.   Respiratory: Negative for shortness of breath.    Cardiovascular: Negative for chest pain.   Gastrointestinal: Negative for abdominal pain.   Genitourinary: Negative for urgency.   Musculoskeletal: Negative for arthralgias.   Skin: Negative for wound.    Neurological: Negative for headaches.   Hematological: Does not bruise/bleed easily.   Psychiatric/Behavioral: Negative for sleep disturbance.     Objective:     Vitals:    11/08/19 1403   Resp: 18     BP Readings from Last 5 Encounters:   10/28/19 (!) 88/0   10/25/19 (!) 100/0   10/18/19 106/74   10/10/19 (!) 86/0   09/06/19 (!) 100/0       Physical Exam   Constitutional: He is oriented to person, place, and time. He appears well-developed.   HENT:   Right Ear: External ear normal.   Left Ear: External ear normal.   Nose: Nose normal.   Hearing grossly normal  Dentition grossly normal   Neck: No tracheal deviation present. No thyromegaly (no tenderness) present.   Cardiovascular:   LVAD hum audible throughout precordium and over abdomen   Pulmonary/Chest: Effort normal and breath sounds normal.   Abdominal: Soft. He exhibits no mass. There is no tenderness.   Musculoskeletal: He exhibits no edema.   No digital clubbing or extremity cyanosis   Neurological: He is alert and oriented to person, place, and time. Coordination normal.   No tremor   Skin: No rash noted.   No subcutaneous nodules noted.   Psychiatric: He has a normal mood and affect. His behavior is normal.   Alert and oriented to person, place, and situation.   Nursing note and vitals reviewed.      Wt Readings from Last 10 Encounters:   11/08/19 1403 118.8 kg (261 lb 14.5 oz)   10/28/19 1320 119.7 kg (264 lb)   10/25/19 0712 119.3 kg (263 lb)   10/18/19 0700 118.9 kg (262 lb 2 oz)   10/17/19 1422 118.2 kg (260 lb 9.3 oz)   10/17/19 0701 120.3 kg (265 lb 3.4 oz)   10/17/19 0400 120.3 kg (265 lb 3.4 oz)   10/16/19 1543 117.9 kg (260 lb)   10/15/19 1100 118.1 kg (260 lb 4.1 oz)   10/15/19 0400 119.3 kg (263 lb 0.1 oz)   10/14/19 0400 122.3 kg (269 lb 11.7 oz)   10/13/19 0100 127.6 kg (281 lb 4.9 oz)   10/13/19 0059 127.6 kg (281 lb 4.9 oz)   10/12/19 1858 126.6 kg (279 lb)   10/12/19 1339 126.8 kg (279 lb 8.7 oz)   10/12/19 1237 126.6 kg (279 lb)    10/10/19 1613 127 kg (279 lb 14.1 oz)   08/28/19 1555 122.9 kg (271 lb)   07/23/19 0400 117.1 kg (258 lb 4.3 oz)   07/22/19 0500 116.9 kg (257 lb 11.5 oz)   07/21/19 0500 117.6 kg (259 lb 4.2 oz)   07/20/19 0511 116.9 kg (257 lb 11.5 oz)   07/18/19 0333 119.9 kg (264 lb 5.3 oz)   07/17/19 1415 117.9 kg (260 lb)   07/16/19 1915 117.9 kg (260 lb)   07/16/19 1857 117.9 kg (260 lb)   07/16/19 1700 117.9 kg (260 lb)   07/16/19 1219 117.9 kg (260 lb)   05/31/19 1642 117.9 kg (260 lb)   05/23/19 1543 118.8 kg (262 lb)   05/10/19 1145 120.3 kg (265 lb 3.4 oz)       Lab Results   Component Value Date    HGBA1C 5.5 03/08/2018    HGBA1C 5.4 01/23/2018    HGBA1C 5.6 08/04/2016    HGBA1C 5.8 09/11/2015     Lab Results   Component Value Date    CHOL 150 11/07/2018    CHOL 147 04/19/2018    HDL 85 (H) 11/07/2018    HDL 73 04/19/2018    LDLCALC 53.0 (L) 11/07/2018    LDLCALC 60.8 (L) 04/19/2018    TRIG 60 11/07/2018    TRIG 66 04/19/2018    CHOLHDL 56.7 (H) 11/07/2018    CHOLHDL 49.7 04/19/2018     Lab Results   Component Value Date     10/28/2019    K 4.3 10/28/2019     10/28/2019    CO2 27 10/28/2019    GLU 83 10/28/2019    BUN 19 10/28/2019    CREATININE 2.0 (H) 10/28/2019    CALCIUM 10.2 10/28/2019    PROT 8.3 10/28/2019    ALBUMIN 4.0 10/28/2019    BILITOT 0.6 10/28/2019    ALKPHOS 117 10/28/2019    AST 32 10/28/2019    ALT 40 10/28/2019    ANIONGAP 7 (L) 10/28/2019    ESTGFRAFRICA 43.1 (A) 10/28/2019    EGFRNONAA 37.3 (A) 10/28/2019    TSH <0.010 (L) 10/28/2019      No results found for: MICALBCREAT    Assessment/Plan:     VT (ventricular tachycardia)  Okay to continue amiodarone, but will need close monitoring of thyroid function long term.    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 Fortunately he is minimally symptomatic and has a defibrillator to protect against additional arrhythmias.    Start prednisone 40 mg daily and plan to  trend TFTs weekly. Cannot rule out type 1, although this would be very unlikely. Will start methimazole 10 BID as well awaiting improvement in TFTs. Plan to quickly taper off if improving. Discussed side-effects of prednisone and methimazole. Warned against over-eating with prednisone.    Check thyroid u/s to assess for underlying nodules and thyroidal blood flow by doppler, which can sometimes help distinguish between the types of AIT.    DCM (dilated cardiomyopathy)  At increased risk for tachyarrhythmias.       RTC in 3 months

## 2019-11-08 NOTE — PROGRESS NOTES
INR not at goal. Medications, chart, and patient findings reviewed. See calendar for adjustments to dose and follow up plan.  Starting prednisone 11/8, will hold dose today in light of fairly recent GI and lower overall out of precaution.  Limit cranberry juice.

## 2019-11-08 NOTE — ASSESSMENT & PLAN NOTE
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 Fortunately he is minimally symptomatic and has a defibrillator to protect against additional arrhythmias.    Start prednisone 40 mg daily and plan to trend TFTs weekly. Cannot rule out type 1, although this would be very unlikely. Will start methimazole 10 BID as well awaiting improvement in TFTs. Plan to quickly taper off if improving. Discussed side-effects of prednisone and methimazole. Warned against over-eating with prednisone.    Check thyroid u/s to assess for underlying nodules and thyroidal blood flow by doppler, which can sometimes help distinguish between the types of AIT.

## 2019-11-11 ENCOUNTER — PATIENT MESSAGE (OUTPATIENT)
Dept: TRANSPLANT | Facility: CLINIC | Age: 54
End: 2019-11-11

## 2019-11-14 ENCOUNTER — ANTI-COAG VISIT (OUTPATIENT)
Dept: CARDIOLOGY | Facility: CLINIC | Age: 54
End: 2019-11-14
Payer: COMMERCIAL

## 2019-11-14 DIAGNOSIS — Z79.01 LONG TERM (CURRENT) USE OF ANTICOAGULANTS: ICD-10-CM

## 2019-11-14 DIAGNOSIS — Z95.811 LVAD (LEFT VENTRICULAR ASSIST DEVICE) PRESENT: ICD-10-CM

## 2019-11-14 PROCEDURE — 93793 ANTICOAG MGMT PT WARFARIN: CPT | Mod: S$GLB,,,

## 2019-11-14 PROCEDURE — 93793 PR ANTICOAGULANT MGMT FOR PT TAKING WARFARIN: ICD-10-PCS | Mod: S$GLB,,,

## 2019-11-14 NOTE — PROGRESS NOTES
Patient advised of dose instructions and verbalized understanding.  Declined Monday and r/s to 11/20

## 2019-11-14 NOTE — PROGRESS NOTES
Wife confirmed dose and denies any other changes.  Wife stated she thinks pt dose should be low due to him not consuming any ETOH anymore - Prednisone 40 mg QD.

## 2019-11-15 ENCOUNTER — TELEPHONE (OUTPATIENT)
Dept: ENDOCRINOLOGY | Facility: CLINIC | Age: 54
End: 2019-11-15

## 2019-11-15 DIAGNOSIS — E05.80 AMIODARONE-INDUCED HYPERTHYROIDISM: Primary | ICD-10-CM

## 2019-11-15 DIAGNOSIS — T46.2X5A AMIODARONE-INDUCED HYPERTHYROIDISM: Primary | ICD-10-CM

## 2019-11-15 RX ORDER — PREDNISONE 20 MG/1
60 TABLET ORAL DAILY
Qty: 60 TABLET | Refills: 2
Start: 2019-11-15 | End: 2020-01-30

## 2019-11-15 RX ORDER — METHIMAZOLE 10 MG/1
TABLET ORAL
Qty: 60 TABLET | Refills: 1
Start: 2019-11-15 | End: 2019-12-05 | Stop reason: SDUPTHER

## 2019-11-15 NOTE — TELEPHONE ENCOUNTER
I spoke with him regarding the persistently high FT4. He is feeling overheated a little and has noticed increased appetite on prednisone. FT4 actually increased a bit since last check. He reports compliance with prednisone and MMI.    Still suspect type 2 AIT given longevity of amiodarone use and no pre-existing thyroid disease, but will increase PDN to 60 mg daily and increase MMI to 20/10. Recheck TSH on 12/5. Discussed the possibility of requiring thyroidectomy if this does not resolve with medical management. We aren't to that point yet, but I just wanted to broach the topic.    Patient voiced understanding of the plan.    TRAb still pending, although doubt underlying Graves'.

## 2019-11-21 ENCOUNTER — PATIENT MESSAGE (OUTPATIENT)
Dept: TRANSPLANT | Facility: CLINIC | Age: 54
End: 2019-11-21

## 2019-12-02 DIAGNOSIS — T46.2X5A AMIODARONE-INDUCED HYPERTHYROIDISM: ICD-10-CM

## 2019-12-02 DIAGNOSIS — E05.80 AMIODARONE-INDUCED HYPERTHYROIDISM: ICD-10-CM

## 2019-12-02 RX ORDER — METHIMAZOLE 10 MG/1
TABLET ORAL
Qty: 90 TABLET | Refills: 1 | Status: SHIPPED | OUTPATIENT
Start: 2019-12-02 | End: 2019-12-05 | Stop reason: SDUPTHER

## 2019-12-05 ENCOUNTER — ANTI-COAG VISIT (OUTPATIENT)
Dept: CARDIOLOGY | Facility: CLINIC | Age: 54
End: 2019-12-05
Payer: COMMERCIAL

## 2019-12-05 ENCOUNTER — LAB VISIT (OUTPATIENT)
Dept: LAB | Facility: HOSPITAL | Age: 54
End: 2019-12-05
Attending: INTERNAL MEDICINE
Payer: COMMERCIAL

## 2019-12-05 ENCOUNTER — PATIENT MESSAGE (OUTPATIENT)
Dept: ENDOCRINOLOGY | Facility: CLINIC | Age: 54
End: 2019-12-05

## 2019-12-05 ENCOUNTER — CLINICAL SUPPORT (OUTPATIENT)
Dept: TRANSPLANT | Facility: CLINIC | Age: 54
End: 2019-12-05
Payer: COMMERCIAL

## 2019-12-05 ENCOUNTER — HOSPITAL ENCOUNTER (OUTPATIENT)
Dept: PULMONOLOGY | Facility: CLINIC | Age: 54
Discharge: HOME | End: 2019-12-05
Payer: COMMERCIAL

## 2019-12-05 ENCOUNTER — OFFICE VISIT (OUTPATIENT)
Dept: TRANSPLANT | Facility: CLINIC | Age: 54
End: 2019-12-05
Attending: INTERNAL MEDICINE
Payer: COMMERCIAL

## 2019-12-05 VITALS
TEMPERATURE: 99 F | HEART RATE: 96 BPM | OXYGEN SATURATION: 97 % | HEIGHT: 73 IN | WEIGHT: 265 LBS | BODY MASS INDEX: 35.12 KG/M2 | SYSTOLIC BLOOD PRESSURE: 95 MMHG

## 2019-12-05 VITALS — WEIGHT: 266 LBS | HEIGHT: 73 IN | BODY MASS INDEX: 35.25 KG/M2

## 2019-12-05 DIAGNOSIS — Z95.810 BIVENTRICULAR IMPLANTABLE CARDIOVERTER-DEFIBRILLATOR IN SITU: ICD-10-CM

## 2019-12-05 DIAGNOSIS — Z95.811 LVAD (LEFT VENTRICULAR ASSIST DEVICE) PRESENT: Primary | ICD-10-CM

## 2019-12-05 DIAGNOSIS — E05.80 AMIODARONE-INDUCED HYPERTHYROIDISM: ICD-10-CM

## 2019-12-05 DIAGNOSIS — I13.0 HYPERTENSIVE CARDIOVASCULAR-RENAL DISEASE, STAGE 1-4 OR UNSPECIFIED CHRONIC KIDNEY DISEASE, WITH HEART FAILURE: ICD-10-CM

## 2019-12-05 DIAGNOSIS — E87.5 HYPERKALEMIA: ICD-10-CM

## 2019-12-05 DIAGNOSIS — T46.2X5A AMIODARONE-INDUCED HYPERTHYROIDISM: ICD-10-CM

## 2019-12-05 DIAGNOSIS — I50.42 CHRONIC COMBINED SYSTOLIC AND DIASTOLIC HEART FAILURE: ICD-10-CM

## 2019-12-05 DIAGNOSIS — Z95.811 HEART REPLACED BY HEART ASSIST DEVICE: ICD-10-CM

## 2019-12-05 DIAGNOSIS — I47.29 PVT (PAROXYSMAL VENTRICULAR TACHYCARDIA): ICD-10-CM

## 2019-12-05 DIAGNOSIS — E05.80 AMIODARONE-INDUCED HYPERTHYROIDISM: Primary | ICD-10-CM

## 2019-12-05 DIAGNOSIS — N18.30 CKD (CHRONIC KIDNEY DISEASE), STAGE III: ICD-10-CM

## 2019-12-05 DIAGNOSIS — I42.0 DCM (DILATED CARDIOMYOPATHY): ICD-10-CM

## 2019-12-05 DIAGNOSIS — T46.2X5A AMIODARONE-INDUCED HYPERTHYROIDISM: Primary | ICD-10-CM

## 2019-12-05 DIAGNOSIS — I50.9 HEART FAILURE: ICD-10-CM

## 2019-12-05 DIAGNOSIS — Z79.899 HIGH RISK MEDICATIONS (NOT ANTICOAGULANTS) LONG-TERM USE: ICD-10-CM

## 2019-12-05 LAB
ALBUMIN SERPL BCP-MCNC: 4.1 G/DL (ref 3.5–5.2)
ALP SERPL-CCNC: 116 U/L (ref 55–135)
ALT SERPL W/O P-5'-P-CCNC: 35 U/L (ref 10–44)
ANION GAP SERPL CALC-SCNC: 8 MMOL/L (ref 8–16)
AST SERPL-CCNC: 24 U/L (ref 10–40)
BASOPHILS # BLD AUTO: 0.01 K/UL (ref 0–0.2)
BASOPHILS NFR BLD: 0.1 % (ref 0–1.9)
BILIRUB DIRECT SERPL-MCNC: 0.3 MG/DL (ref 0.1–0.3)
BILIRUB SERPL-MCNC: 0.6 MG/DL (ref 0.1–1)
BNP SERPL-MCNC: 383 PG/ML (ref 0–99)
BUN SERPL-MCNC: 39 MG/DL (ref 6–20)
CALCIUM SERPL-MCNC: 9.8 MG/DL (ref 8.7–10.5)
CHLORIDE SERPL-SCNC: 99 MMOL/L (ref 95–110)
CO2 SERPL-SCNC: 27 MMOL/L (ref 23–29)
CREAT SERPL-MCNC: 2.3 MG/DL (ref 0.5–1.4)
CRP SERPL-MCNC: 7.4 MG/L (ref 0–8.2)
DIFFERENTIAL METHOD: ABNORMAL
EOSINOPHIL # BLD AUTO: 0 K/UL (ref 0–0.5)
EOSINOPHIL NFR BLD: 0.1 % (ref 0–8)
ERYTHROCYTE [DISTWIDTH] IN BLOOD BY AUTOMATED COUNT: 16 % (ref 11.5–14.5)
EST. GFR  (AFRICAN AMERICAN): 36 ML/MIN/1.73 M^2
EST. GFR  (NON AFRICAN AMERICAN): 31 ML/MIN/1.73 M^2
GLUCOSE SERPL-MCNC: 106 MG/DL (ref 70–110)
HCT VFR BLD AUTO: 44.1 % (ref 40–54)
HGB BLD-MCNC: 13.4 G/DL (ref 14–18)
IMM GRANULOCYTES # BLD AUTO: 0.03 K/UL (ref 0–0.04)
IMM GRANULOCYTES NFR BLD AUTO: 0.4 % (ref 0–0.5)
INR PPP: 2.7 (ref 0.8–1.2)
LDH SERPL L TO P-CCNC: 384 U/L (ref 110–260)
LYMPHOCYTES # BLD AUTO: 0.4 K/UL (ref 1–4.8)
LYMPHOCYTES NFR BLD: 5.2 % (ref 18–48)
MAGNESIUM SERPL-MCNC: 2.5 MG/DL (ref 1.6–2.6)
MCH RBC QN AUTO: 26 PG (ref 27–31)
MCHC RBC AUTO-ENTMCNC: 30.4 G/DL (ref 32–36)
MCV RBC AUTO: 86 FL (ref 82–98)
MONOCYTES # BLD AUTO: 0.3 K/UL (ref 0.3–1)
MONOCYTES NFR BLD: 3.1 % (ref 4–15)
NEUTROPHILS # BLD AUTO: 7.3 K/UL (ref 1.8–7.7)
NEUTROPHILS NFR BLD: 91.1 % (ref 38–73)
NRBC BLD-RTO: 0 /100 WBC
PHOSPHATE SERPL-MCNC: 3 MG/DL (ref 2.7–4.5)
PLATELET # BLD AUTO: 169 K/UL (ref 150–350)
PMV BLD AUTO: 9.6 FL (ref 9.2–12.9)
POTASSIUM SERPL-SCNC: 5.2 MMOL/L (ref 3.5–5.1)
PREALB SERPL-MCNC: 40 MG/DL (ref 20–43)
PROT SERPL-MCNC: 8 G/DL (ref 6–8.4)
PROTHROMBIN TIME: 28.7 SEC (ref 9–12.5)
RBC # BLD AUTO: 5.15 M/UL (ref 4.6–6.2)
SODIUM SERPL-SCNC: 134 MMOL/L (ref 136–145)
T4 FREE SERPL-MCNC: 1.96 NG/DL (ref 0.71–1.51)
TSH SERPL DL<=0.005 MIU/L-ACNC: <0.01 UIU/ML (ref 0.4–4)
WBC # BLD AUTO: 8.01 K/UL (ref 3.9–12.7)

## 2019-12-05 PROCEDURE — 93750 PR INTERROGATE VENT ASSIST DEV, IN PERSON, W PHYSICIAN ANALYSIS: ICD-10-PCS | Mod: S$GLB,,, | Performed by: INTERNAL MEDICINE

## 2019-12-05 PROCEDURE — 83735 ASSAY OF MAGNESIUM: CPT

## 2019-12-05 PROCEDURE — 99999 PR PBB SHADOW E&M-EST. PATIENT-LVL IV: CPT | Mod: PBBFAC,,, | Performed by: INTERNAL MEDICINE

## 2019-12-05 PROCEDURE — 36415 COLL VENOUS BLD VENIPUNCTURE: CPT

## 2019-12-05 PROCEDURE — 82248 BILIRUBIN DIRECT: CPT

## 2019-12-05 PROCEDURE — 93750 OP LVAD INTERROGATION: ICD-10-PCS | Mod: S$GLB,,, | Performed by: INTERNAL MEDICINE

## 2019-12-05 PROCEDURE — 99214 OFFICE O/P EST MOD 30 MIN: CPT | Mod: S$GLB,,, | Performed by: INTERNAL MEDICINE

## 2019-12-05 PROCEDURE — 80053 COMPREHEN METABOLIC PANEL: CPT

## 2019-12-05 PROCEDURE — 85025 COMPLETE CBC W/AUTO DIFF WBC: CPT

## 2019-12-05 PROCEDURE — 86140 C-REACTIVE PROTEIN: CPT

## 2019-12-05 PROCEDURE — 99999 PR PBB SHADOW E&M-EST. PATIENT-LVL IV: ICD-10-PCS | Mod: PBBFAC,,, | Performed by: INTERNAL MEDICINE

## 2019-12-05 PROCEDURE — 83880 ASSAY OF NATRIURETIC PEPTIDE: CPT

## 2019-12-05 PROCEDURE — 84439 ASSAY OF FREE THYROXINE: CPT

## 2019-12-05 PROCEDURE — 99214 PR OFFICE/OUTPT VISIT, EST, LEVL IV, 30-39 MIN: ICD-10-PCS | Mod: S$GLB,,, | Performed by: INTERNAL MEDICINE

## 2019-12-05 PROCEDURE — 83615 LACTATE (LD) (LDH) ENZYME: CPT

## 2019-12-05 PROCEDURE — 84100 ASSAY OF PHOSPHORUS: CPT

## 2019-12-05 PROCEDURE — 93750 INTERROGATION VAD IN PERSON: CPT | Mod: S$GLB,,, | Performed by: INTERNAL MEDICINE

## 2019-12-05 PROCEDURE — 94618 PULMONARY STRESS TESTING: ICD-10-PCS | Mod: S$GLB,,, | Performed by: INTERNAL MEDICINE

## 2019-12-05 PROCEDURE — 84134 ASSAY OF PREALBUMIN: CPT

## 2019-12-05 PROCEDURE — 84443 ASSAY THYROID STIM HORMONE: CPT

## 2019-12-05 PROCEDURE — 99999 PR PBB SHADOW E&M-EST. PATIENT-LVL I: CPT | Mod: PBBFAC,,,

## 2019-12-05 PROCEDURE — 99999 PR PBB SHADOW E&M-EST. PATIENT-LVL I: ICD-10-PCS | Mod: PBBFAC,,,

## 2019-12-05 PROCEDURE — 94618 PULMONARY STRESS TESTING: CPT | Mod: S$GLB,,, | Performed by: INTERNAL MEDICINE

## 2019-12-05 PROCEDURE — 85610 PROTHROMBIN TIME: CPT

## 2019-12-05 RX ORDER — POTASSIUM CHLORIDE 1500 MG/1
TABLET, EXTENDED RELEASE ORAL
Qty: 80 TABLET | Refills: 11 | Status: ON HOLD | OUTPATIENT
Start: 2019-12-05 | End: 2020-01-20 | Stop reason: SDUPTHER

## 2019-12-05 RX ORDER — METHIMAZOLE 10 MG/1
TABLET ORAL
Qty: 90 TABLET | Refills: 1 | Status: SHIPPED | OUTPATIENT
Start: 2019-12-05 | End: 2019-12-30

## 2019-12-05 RX ORDER — TORSEMIDE 20 MG/1
TABLET ORAL
Qty: 80 TABLET | Refills: 11 | Status: ON HOLD | OUTPATIENT
Start: 2019-12-05 | End: 2020-01-20 | Stop reason: HOSPADM

## 2019-12-05 NOTE — PROGRESS NOTES
Date of Implant with Heartmate II LVAD: 3/8/18    PATIENT ARRIVED IN CLINIC:  Ambulatory   Accompanied by: self    Vitals  Temperature, oral:   Temp Readings from Last 1 Encounters:   19 98.8 °F (37.1 °C) (Oral)     Blood Pressure:   BP Readings from Last 3 Encounters:   19 (!) 95/0   10/28/19 (!) 88/0   10/25/19 (!) 100/0        VAD Interrogation:  TXP SACHI INTERROGATIONS 2019 10/18/2019 10/18/2019   Type HeartMate II - -   Flow 6.1 - -   Speed 14347 - -   PI 3.7 - -   Power (Jackson) 7.0 - -   LSL 9600 - -   Pulsatility No Pulse Intermittent pulse Intermittent pulse       History [id number]  Problems / Issues / Alarms with VAD if any: no external power, : power outage, : unplugged from the wall (re-educated)  VAD Sounds: HM2 Smooth      HCT:   Lab Results   Component Value Date    HCT 44.1 2019    HCT 26 (L) 03/10/2018       Complaints/reason for visit today: routine  Emergency Equipment With Patient: yes   VAD Binder With Patient: no   Reviewed VAD Numbers In Binder: no    Any Equipment Issues: None noted (Refer to  note for complete details)    DLES Assessment:  Appearance Of Driveline: 1  Antibiotics: NO  Velour: no  Manual & Visual Inspection Of Driveline: No kinks or tears noted  Stabilization Device In Use: yes, sun securement device      Patient MyChart Questionnaire:   VAD QUESTIONNAIRE ANSWERS 2019   Have you had any LVAD related issues or alarms? No   Have you had any LVAD Equipment issues? No   How often do you do your LVAD dressing change? Twice per week   What type of dressing change do you do?  Biweekly kit   Do you need dressing change supplies? No   Do you need any new LVAD Accessories? (i.e Battery Holster Vest, Holster Vest, RT/LT Consolidation Bag) No   Have you been using your Monthly Equipment Checklist? Yes   Description of Stools: Normal and formed without blood   How Often: Every other day   Color Of Urine: Clear/Yellow   Are  you experiencing: Coping OK?   How many hours per night are you sleeping? 5   Do you take any medications to help you fall asleep? No   Are you Showering? Yes   Do you change your dressing immediately after you dry off?  Yes   Are you exercising? Yes   If so, what do you do and for how long per day? Work 10 Hrs   How often are you exercising? 3 times a week   Are you working or what do you do to stay active? Work   Are you driving? Yes   Do you know that if you have an alarm while driving you need to pull over first before looking at your alarm to avoid an accident? Yes   Are you in pain? No   Do you take any medications for pain?  No   What can we do for you? advise on transplant checklist and assure we are logging all needed steps   Is your appointment today? No          Labs:    Chemistry        Component Value Date/Time     (L) 12/05/2019 1227    K 5.2 (H) 12/05/2019 1227    CL 99 12/05/2019 1227    CO2 27 12/05/2019 1227    BUN 39 (H) 12/05/2019 1227    CREATININE 2.3 (H) 12/05/2019 1227     12/05/2019 1227        Component Value Date/Time    CALCIUM 9.8 12/05/2019 1227    ALKPHOS 116 12/05/2019 1227    AST 24 12/05/2019 1227    ALT 35 12/05/2019 1227    BILITOT 0.6 12/05/2019 1227    ESTGFRAFRICA 36 (A) 12/05/2019 1227    EGFRNONAA 31 (A) 12/05/2019 1227            Magnesium   Date Value Ref Range Status   12/05/2019 2.5 1.6 - 2.6 mg/dL Final       Lab Results   Component Value Date    WBC 8.01 12/05/2019    HGB 13.4 (L) 12/05/2019    HCT 44.1 12/05/2019    MCV 86 12/05/2019     12/05/2019       Lab Results   Component Value Date    INR 2.7 (H) 12/05/2019    INR 3.5 (H) 11/14/2019    INR 3.2 (H) 11/08/2019       BNP   Date Value Ref Range Status   12/05/2019 383 (H) 0 - 99 pg/mL Final     Comment:     Values of less than 100 pg/ml are consistent with non-CHF populations.   11/14/2019 399 (H) 0 - 99 pg/mL Final     Comment:     Values of less than 100 pg/ml are consistent with non-CHF  populations.   10/28/2019 174 (H) 0 - 99 pg/mL Final     Comment:     Values of less than 100 pg/ml are consistent with non-CHF populations.       LD   Date Value Ref Range Status   12/05/2019 384 (H) 110 - 260 U/L Final     Comment:     Results are increased in hemolyzed samples.   11/14/2019 216 (H) 84 - 195 U/L Final     Comment:     Results are increased in hemolyzed samples.   10/18/2019 244 110 - 260 U/L Final     Comment:     Results are increased in hemolyzed samples.       Labs reviewed with patient: YES      Patient Satisfaction Survey completed per patient: No  (explained about signature and box to check)  Medication reconciliation: per MA.  Medication Detail updated today: no  Coumadin Managed by: [facility]    Education: Reviewed driveline care, emergency procedures, how to change the controller, alarms with patient, as well as discussed how to page the VAD coordinator in case of an emergency. It is medically necessary to have VAD management kits in order to prevent infection or to assist in the healing of an infected DLES.     Plans/Needs: Routine f/u. Charged for UBC per Jose F Stark, VAD technician. Pt's creatinine up at 2.3, K+ up at 5.2. Volume up on exam, per Dr. Hadley. Pt to increase Torsemide to 40mg BID on MWF and 40mg daily all other days; increase KCl to 20mEq BID MWF and 20mEq daily all other days. BMP/BNP with next INR. RTC 2 months.     Hurricane Season: No

## 2019-12-05 NOTE — PROGRESS NOTES
Seen pt in clinic, charged backup controller, conducted self test/ passed. Will follow up at next clinic visit.

## 2019-12-05 NOTE — PROGRESS NOTES
"Subjective:   Patient ID:  Tim Richards is a 54 y.o. male who presents for LVAD followup visit.       Implant Date: 3/8/2018     Heartmate II RPM 9800  3/9/18 outflow graft changed     INR goal: 2-3   NO Bridge with Heparin    TXP SACHI INTERROGATIONS 12/5/2019   Type HeartMate II   Flow 6.1   Speed 19993   PI 3.7   Power (Jackson) 7.0   LSL 9600   Pulsatility No Pulse       HPI   Mr. Richards is a very pleasant 54 yo AAM with DCM, HBP, CHF stage D s/p HM 2 comes for routine visit. Patient was admitted with major GI bleed 07/16/19 through 07/23/19. Arrived with BRBPR to ED, hgb down 7 units prbcs, from 14 to 7, was admitted to ICU.  Patient had major hematochezia, underwent 10 units transfusion, with thrombocytopenia, etc. As well, got heme consult, etc. Had EGD and cscope, demonstrating non bleeding ulcer in tranverse colon and cecal ulcer with clot adhered to the base of the ulcer. There was concern raised for ischemic colitis, we attempted mesenteric u/s however was a poor study.     Mr. Richards comes for regular follow up. Overall, patient is doing well. Mildly volume overload on physical exam today. VAD speed is at 1000 rpm. No VAD alarms noted on interrogation occasional PI events. BP is 95 mmHg (Doppler) . DLES is a "1". INR is therapeutic at 2.7. LDH is 384 at baseline. Labs reviewed Creatinine up to 2.3.   Denies any chest pain, shortness of breath or palpitations. No nausea, vomiting, diarrhea or constipation. No fever, chills or recent infections.      Review of Systems   Constitution: Negative. Negative for chills, decreased appetite, diaphoresis, fever, malaise/fatigue, night sweats, weight gain and weight loss.   HENT: Negative for congestion, hearing loss, hoarse voice and sore throat.    Eyes: Negative.  Negative for double vision and pain.   Cardiovascular: Negative for chest pain, claudication, cyanosis, dyspnea on exertion, irregular heartbeat, leg swelling, near-syncope, orthopnea, palpitations, " "paroxysmal nocturnal dyspnea and syncope.   Respiratory: Negative for cough, hemoptysis, shortness of breath, sleep disturbances due to breathing, snoring, sputum production and wheezing.    Endocrine: Negative.  Negative for heat intolerance, polydipsia and polyuria.   Hematologic/Lymphatic: Negative.  Negative for bleeding problem.   Skin: Negative for color change, dry skin and nail changes.   Musculoskeletal: Negative.  Negative for falls and neck pain.   Gastrointestinal: Negative.  Negative for change in bowel habit, constipation, diarrhea, dysphagia, heartburn, hematochezia, jaundice, melena and nausea.   Genitourinary: Negative.  Negative for bladder incontinence, hematuria, hesitancy and urgency.   Neurological: Negative for dizziness, headaches, light-headedness, numbness, paresthesias, seizures and weakness.   Psychiatric/Behavioral: Negative for depression and memory loss. The patient is not nervous/anxious.        Objective:   Blood pressure (!) 95/0, pulse 96, temperature 98.8 °F (37.1 °C), temperature source Oral, height 6' 1" (1.854 m), weight 120.2 kg (265 lb), SpO2 97 %.body mass index is 34.96 kg/m².    Doppler: 95    Physical Exam   Constitutional: He appears well-developed.   BP (!) 86/0   Temp 98.4 °F (36.9 °C) (Oral)   Ht 6' 1" (1.854 m)   Wt 127 kg (279 lb 14.1 oz)   BMI 36.93 kg/m²      HENT:   Head: Normocephalic.   Neck: JVD present. Carotid bruit is not present.   JVD 16 cm   Cardiovascular: Regular rhythm and normal heart sounds.   No murmur heard.  Smooth VAD hum. DLES is "1"   Pulmonary/Chest: Effort normal and breath sounds normal. No respiratory distress. He has no wheezes. He has no rales.   Abdominal: Soft. Bowel sounds are normal. He exhibits no distension. There is no tenderness. There is no rebound.   Musculoskeletal: He exhibits no edema.   Neurological: He is alert.   Skin: Skin is warm.   Vitals reviewed.    Lab Results   Component Value Date    WBC 8.01 12/05/2019    HGB " 13.4 (L) 12/05/2019    HCT 44.1 12/05/2019    MCV 86 12/05/2019     12/05/2019    CO2 27 12/05/2019    CREATININE 2.3 (H) 12/05/2019    CALCIUM 9.8 12/05/2019    ALBUMIN 4.1 12/05/2019    AST 24 12/05/2019     (H) 12/05/2019    ALT 35 12/05/2019     (H) 12/05/2019       Lab Results   Component Value Date    INR 2.7 (H) 12/05/2019    INR 3.5 (H) 11/14/2019    INR 3.2 (H) 11/08/2019       BNP   Date Value Ref Range Status   12/05/2019 383 (H) 0 - 99 pg/mL Final     Comment:     Values of less than 100 pg/ml are consistent with non-CHF populations.   11/14/2019 399 (H) 0 - 99 pg/mL Final     Comment:     Values of less than 100 pg/ml are consistent with non-CHF populations.   10/28/2019 174 (H) 0 - 99 pg/mL Final     Comment:     Values of less than 100 pg/ml are consistent with non-CHF populations.       LD   Date Value Ref Range Status   12/05/2019 384 (H) 110 - 260 U/L Final     Comment:     Results are increased in hemolyzed samples.   11/14/2019 216 (H) 84 - 195 U/L Final     Comment:     Results are increased in hemolyzed samples.   10/18/2019 244 110 - 260 U/L Final     Comment:     Results are increased in hemolyzed samples.     Assessment:      1. LVAD (left ventricular assist device) present    2. Hyperkalemia    3. Chronic combined systolic and diastolic heart failure    4. Amiodarone-induced hyperthyroidism    5. Hypertensive cardiovascular-renal disease, stage 1-4 or unspecified chronic kidney disease, with heart failure    6. DCM (dilated cardiomyopathy)    7. High risk medications (amiodarone) long-term use    8. CKD (chronic kidney disease), stage III    9. PVT (paroxysmal ventricular tachycardia)    10. Biventricular implantable cardioverter-defibrillator in situ      Plan:   1.- Dilated cardiomyopathy s/p HM3 (3/8/18). Mild fluid overload on physical exam today. Patient was taking Torsemide 20 mg BID.   - We discussed further plan with him:  · Take 40 mg (2 tabs) twice a day on  "M-W-F. Take 40 mg (2 tabs) once a day Ge-Be-Kt-Sat  · Take 20 mEq (1 tab) twice daily on M-W-F. Take 20 mEq (1 tab) once daily on Tu-Th-Sat-Geller    - He is functional class II with evidence of volume overload.   - His INR is therapeutic today and his DLES is "1".   - VAD interrogation was performed in clinic  - Any VAD management kits dispensed today medically necessary  - Recommend 2 gram sodium restriction and 1500cc fluid restriction.  - Encourage physical activity with graded exercise program.  - Requested patient to weigh themselves daily, and to notify us if their weight increases by more than 3 lbs in 1 day or 5 lbs in 1 week.     Listed for transplant: VEENA MOYA Patient Status  Functional Status: 90% - Able to carry on normal activity: minor symptoms of disease  Physical Capacity: No Limitations  Working for Income: No  If no, reason not working: Demands of Treatment    Answers for HPI/ROS submitted by the patient on 12/4/2019   Sweats?: No  chest tightness: No  Difficulty breathing when lying down?: No  syncope: No      I have seen the patient, reviewed the fellow's assessment and plan. I have personally interviewed and examined the patient at bedside and: agree with the findings.    Supplies ordered are medically necessary for care of LVAD and DL.    JVP 16 cm  No significant edema  Wt down from last clinic visit which was before admission with recurrent VF and ADHF.  He has been taking torsemide 20 mg BID instead of 40 mg AM and 20 mg PM  He has been taking KCl 40 meq BID  Wife confirms (spoke by phone with her outside of room) that he is taking meds, cut alcohol (3 drinks since [facility] hosp stay), doing everything correctly.  Someone has reduced the torsemide dose.  He is not urinating much with PM dose of 20 mg and while more in the AM definitely less than when he was taking 40 mg.  Will increase to 40 mg q AM and give 40 mg PM 3 days/week with KCL 40 meq for each 40 mg torsemide.  Check BMP next week "   Continue treatment and f/u hyperthyroidism due to amiodarone per Endo    Dr. Antonio Hadley MD, [facility], [facility]

## 2019-12-05 NOTE — LETTER
December 5, 2019        Nader Chiu  [address]  [address]  [place] [address]  Phone: [phone]  Fax: [phone]             [facility] [address] [place] [address]  Phone: [phone]   Patient: Tim Richards   MR Number: [id number]   YOB: 1965   Date of Visit: 12/5/2019       Dear Dr. Nader Chiu    Thank you for referring Tim Richards to me for evaluation. Attached you will find relevant portions of my assessment and plan of care.    If you have questions, please do not hesitate to call me. I look forward to following Tim Richards along with you.    Sincerely,    Antonio Hadley Jr, MD    Enclosure    If you would like to receive this communication electronically, please contact [email or web address] or [phone] to request [employer] access.    [facility] is a tool which provides read-only access to select patient information with whom you have a relationship. Its easy to use and provides real time access to review your patients record including encounter summaries, notes, results, and demographic information.    If you feel you have received this communication in error or would no longer like to receive these types of communications, please e-mail [email or web address]

## 2019-12-05 NOTE — PROCEDURES
TXP [facility] INTERROGATIONS 12/5/2019 10/18/2019 10/18/2019 10/17/2019 10/17/2019 10/17/2019 10/17/2019   Type HeartMate II - - - - - -   Flow 6.1 - - - - - -   Speed 31180 - - - - - -   PI 3.7 - - - - - -   Power (Jackson) 7.0 - - - - - -   LSL 9600 - - - - - -   Pulsatility No Pulse Intermittent pulse Intermittent pulse No Pulse No Pulse No Pulse No Pulse   }Interrogation of Ventricular assist device was performed with physician analysis of device parameters and review of device function. I have personally reviewed the interrogation findings and agree with findings as stated.

## 2019-12-05 NOTE — PROGRESS NOTES
INR at goal. Medications and chart reviewed. No changes noted to necessitate adjustment of warfarin or follow-up plan. See calendar.  Requesting the Mr Richards return to weekly INR monitoring.

## 2019-12-09 NOTE — PROCEDURES
Tim Richards is a 54 y.o.  male patient, who presents for a 6 minute walk test ordered by Amy Rhodes MD.  The diagnosis is (LVAD); Cardiomyopathy.  The patient's BMI is 35.1 kg/m2.  Predicted distance (lower limit of normal) is 422.27 meters.      Test Results:    The test was completed without stopping.  The total time walked was 360 seconds.  During walking, the patient reported:  Dyspnea.  The patient used no assistive devices during testing.     12/05/2019---------Distance: 388.62 meters (1275 feet)     O2 Sat % Supplemental Oxygen Heart Rate Blood Pressure Renan Scale   Pre-exercise  (Resting) 97 % Room Air 89 bpm Unable to obtain 2   During Exercise 98 % Room Air 119 bpm Unable to obtain 7-8   Post-exercise  (Recovery) 97 % Room Air  86 bpm        Recovery Time:  110 seconds    Performing nurse/tech:  Dimitri CRT      PREVIOUS STUDY:   The patient has not had a previous study.      CLINICAL INTERPRETATION:  Six minute walk distance is 388.62 meters (1275 feet) with very heavy dyspnea.  During exercise, there was no desaturation while breathing room air.  Heart rate increased significantly with walking.  The patient did not report non-pulmonary symptoms during exercise.  No previous study performed.  Based upon age and body mass index, exercise capacity is less than predicted.

## 2019-12-12 ENCOUNTER — ANTI-COAG VISIT (OUTPATIENT)
Dept: CARDIOLOGY | Facility: CLINIC | Age: 54
End: 2019-12-12

## 2019-12-12 ENCOUNTER — TELEPHONE (OUTPATIENT)
Dept: ENDOCRINOLOGY | Facility: CLINIC | Age: 54
End: 2019-12-12

## 2019-12-12 ENCOUNTER — PATIENT MESSAGE (OUTPATIENT)
Dept: ENDOCRINOLOGY | Facility: CLINIC | Age: 54
End: 2019-12-12

## 2019-12-12 DIAGNOSIS — Z95.811 LVAD (LEFT VENTRICULAR ASSIST DEVICE) PRESENT: ICD-10-CM

## 2019-12-12 DIAGNOSIS — E05.80 AMIODARONE-INDUCED HYPERTHYROIDISM: Primary | ICD-10-CM

## 2019-12-12 DIAGNOSIS — Z79.01 LONG TERM (CURRENT) USE OF ANTICOAGULANTS: ICD-10-CM

## 2019-12-12 DIAGNOSIS — T46.2X5A AMIODARONE-INDUCED HYPERTHYROIDISM: Primary | ICD-10-CM

## 2019-12-12 NOTE — TELEPHONE ENCOUNTER
Reviewed labs results from this AM.     Ref. Range 7/18/2019 04:00 10/28/2019 14:21 11/14/2019 15:40 12/5/2019 12:27 12/12/2019 11:16   TSH 0.45 - 5.33 uIU/mL 0.065 (L) <0.010 (L) <0.01 (L) <0.010 (L) <0.01 (L)   Free T4 0.61 - 1.12 ng/dL 1.03 3.50 (H) 3.71 (H) 1.96 (H) 5.39 (H)   Thyrotropin Receptor Ab 0.00 - 1.75 IU/L   <1.00       I spoke with him, and other than feeling overheated and with increased appetite, he denies hyperthyroid symptoms. His palpitations have surprisingly decreased despite the FT4 now trending up. He is not sure if he's been taking the prednisone and/or methimazole because his wife handles his medications. He asked me to call her. I tried twice to give her a call, but got no answer. Voicemail was left on her phone to call back ASAP.

## 2019-12-12 NOTE — TELEPHONE ENCOUNTER
Ms. Mendoza called back. He was actually out of the prednisone for 4 days and just got it refilled yesterday. He was also only taking 2 tablets (40 mg) instead of the 60 mg we had discussed. He has also only been taking methimazole 20 mg in the morning instead of 20/10. She says he often forgets to tell her about medication changes, so she didn't get the message after we last spoke. She agrees that he hasn't looked any different or been complaining of any new symptoms except for eating more (likely steroid effect) and perhaps not feeling as cold as he usually does.    I advised her on the medication changes and will send new prescriptions to the pharmacy.    Will get labs in 2 days to make sure the free T4 level is trending down. I told her if he develops any new symptoms whatsoever (including tremor, worsening fatigue, anxiety, altered mentation, palpitations) or starts to look bad at all, he should be taken to the ER for treatment. She voiced understanding and is in agreement with the plan.    Staff message:  Please schedule labs in 2 days at [facility]. If not able to schedule there, please schedule lab at main [place].

## 2019-12-12 NOTE — PROGRESS NOTES
Wife reports pt missed 12/11  dose and taken 5 mg  12/12  Early morning before blood work yesterday - no other changes reported. Pt still taking Prednisone 40 mg .

## 2019-12-13 NOTE — PROGRESS NOTES
Patient wife advised of dose instructions and verbalized understanding.  She reports that patient will not be having thyroidectomy .

## 2019-12-13 NOTE — PROGRESS NOTES
INR not at goal. Medications, chart, and patient findings reviewed. See calendar for adjustments to dose and follow up plan.  Pt has labs pending for this Saturday 12/14, possible admission for thyroidectomy pending these results.  Because of a missed dose with elevated INR in addition to possible surgery, hold today's dose and lower dose for Saturday. Pt is also taking a steroid pulse currently which may additionally effect INR, Hx of GIB. I will also request that he not take his coumadin until Saturday PM in case he is called for admission.  INR STAT Monday.

## 2019-12-19 ENCOUNTER — TELEPHONE (OUTPATIENT)
Dept: ENDOCRINOLOGY | Facility: CLINIC | Age: 54
End: 2019-12-19

## 2019-12-19 DIAGNOSIS — T46.2X5A AMIODARONE-INDUCED HYPERTHYROIDISM: Primary | ICD-10-CM

## 2019-12-19 DIAGNOSIS — E05.80 AMIODARONE-INDUCED HYPERTHYROIDISM: Primary | ICD-10-CM

## 2019-12-19 NOTE — TELEPHONE ENCOUNTER
Please call patient and reschedule his labs tomorrow morning for TSH and free T4. He was a no show for his last two lab appointments. Tell him it's very important that we check his thyroid levels.

## 2019-12-19 NOTE — TELEPHONE ENCOUNTER
Call spoke with wife, she  scheduled lab [facility] @11 am. Wife will inform patient of lab appointment

## 2019-12-20 ENCOUNTER — PATIENT MESSAGE (OUTPATIENT)
Dept: ENDOCRINOLOGY | Facility: CLINIC | Age: 54
End: 2019-12-20

## 2019-12-20 ENCOUNTER — LAB VISIT (OUTPATIENT)
Dept: LAB | Facility: HOSPITAL | Age: 54
End: 2019-12-20
Attending: INTERNAL MEDICINE
Payer: COMMERCIAL

## 2019-12-20 ENCOUNTER — TELEPHONE (OUTPATIENT)
Dept: ENDOCRINOLOGY | Facility: HOSPITAL | Age: 54
End: 2019-12-20

## 2019-12-20 DIAGNOSIS — E05.80 AMIODARONE-INDUCED HYPERTHYROIDISM: Primary | ICD-10-CM

## 2019-12-20 DIAGNOSIS — E05.80 AMIODARONE-INDUCED HYPERTHYROIDISM: ICD-10-CM

## 2019-12-20 DIAGNOSIS — T46.2X5A AMIODARONE-INDUCED HYPERTHYROIDISM: Primary | ICD-10-CM

## 2019-12-20 DIAGNOSIS — T46.2X5A AMIODARONE-INDUCED HYPERTHYROIDISM: ICD-10-CM

## 2019-12-20 LAB
T4 FREE SERPL-MCNC: 2.78 NG/DL (ref 0.71–1.51)
TSH SERPL DL<=0.005 MIU/L-ACNC: <0.01 UIU/ML (ref 0.4–4)

## 2019-12-20 PROCEDURE — 84439 ASSAY OF FREE THYROXINE: CPT

## 2019-12-20 PROCEDURE — 84443 ASSAY THYROID STIM HORMONE: CPT

## 2019-12-20 PROCEDURE — 36415 COLL VENOUS BLD VENIPUNCTURE: CPT

## 2019-12-23 DIAGNOSIS — I49.8 OTHER SPECIFIED CARDIAC ARRHYTHMIAS: Primary | ICD-10-CM

## 2019-12-23 NOTE — TELEPHONE ENCOUNTER
Dr Piedra patient had labs drawn last week 12/20 @ 12:50 pm. The results are in the system. Did you still want me to schedule lab for 1 week?

## 2019-12-24 DIAGNOSIS — E05.80 AMIODARONE-INDUCED HYPERTHYROIDISM: ICD-10-CM

## 2019-12-24 DIAGNOSIS — T46.2X5A AMIODARONE-INDUCED HYPERTHYROIDISM: ICD-10-CM

## 2019-12-30 RX ORDER — METHIMAZOLE 10 MG/1
TABLET ORAL
Qty: 90 TABLET | Refills: 1 | Status: ON HOLD | OUTPATIENT
Start: 2019-12-30 | End: 2020-01-20 | Stop reason: HOSPADM

## 2019-12-30 NOTE — PROGRESS NOTES
P/t no showed for lab 12/19 - will add on INR for 12/31 as it appears he will be at [facility] for labs and appts.

## 2019-12-31 ENCOUNTER — HOSPITAL ENCOUNTER (OUTPATIENT)
Dept: CARDIOLOGY | Facility: CLINIC | Age: 54
Discharge: HOME | End: 2019-12-31
Payer: COMMERCIAL

## 2019-12-31 ENCOUNTER — OFFICE VISIT (OUTPATIENT)
Dept: ELECTROPHYSIOLOGY | Facility: CLINIC | Age: 54
End: 2019-12-31
Payer: COMMERCIAL

## 2019-12-31 ENCOUNTER — ANTI-COAG VISIT (OUTPATIENT)
Dept: CARDIOLOGY | Facility: CLINIC | Age: 54
End: 2019-12-31

## 2019-12-31 ENCOUNTER — CLINICAL SUPPORT (OUTPATIENT)
Dept: CARDIOLOGY | Facility: HOSPITAL | Age: 54
End: 2019-12-31
Attending: INTERNAL MEDICINE
Payer: COMMERCIAL

## 2019-12-31 VITALS — BODY MASS INDEX: 36.44 KG/M2 | HEIGHT: 73 IN | WEIGHT: 274.94 LBS

## 2019-12-31 DIAGNOSIS — I49.8 OTHER SPECIFIED CARDIAC ARRHYTHMIAS: ICD-10-CM

## 2019-12-31 DIAGNOSIS — I47.20 VT (VENTRICULAR TACHYCARDIA): ICD-10-CM

## 2019-12-31 DIAGNOSIS — Z79.01 LONG TERM (CURRENT) USE OF ANTICOAGULANTS: ICD-10-CM

## 2019-12-31 DIAGNOSIS — Z95.811 LVAD (LEFT VENTRICULAR ASSIST DEVICE) PRESENT: ICD-10-CM

## 2019-12-31 DIAGNOSIS — I47.20 VT (VENTRICULAR TACHYCARDIA): Primary | ICD-10-CM

## 2019-12-31 DIAGNOSIS — Z95.810 AUTOMATIC IMPLANTABLE CARDIAC DEFIBRILLATOR IN SITU: Primary | ICD-10-CM

## 2019-12-31 DIAGNOSIS — I42.0 DCM (DILATED CARDIOMYOPATHY): ICD-10-CM

## 2019-12-31 DIAGNOSIS — Z95.810 BIVENTRICULAR IMPLANTABLE CARDIOVERTER-DEFIBRILLATOR IN SITU: ICD-10-CM

## 2019-12-31 PROBLEM — I47.29 PVT (PAROXYSMAL VENTRICULAR TACHYCARDIA): Status: RESOLVED | Noted: 2018-01-10 | Resolved: 2019-12-31

## 2019-12-31 PROCEDURE — 99999 PR PBB SHADOW E&M-EST. PATIENT-LVL III: ICD-10-PCS | Mod: PBBFAC,,, | Performed by: INTERNAL MEDICINE

## 2019-12-31 PROCEDURE — 93010 ELECTROCARDIOGRAM REPORT: CPT | Mod: S$GLB,,, | Performed by: INTERNAL MEDICINE

## 2019-12-31 PROCEDURE — 99214 PR OFFICE/OUTPT VISIT, EST, LEVL IV, 30-39 MIN: ICD-10-PCS | Mod: S$GLB,,, | Performed by: INTERNAL MEDICINE

## 2019-12-31 PROCEDURE — 99999 PR PBB SHADOW E&M-EST. PATIENT-LVL III: CPT | Mod: PBBFAC,,, | Performed by: INTERNAL MEDICINE

## 2019-12-31 PROCEDURE — 93010 RHYTHM STRIP: ICD-10-PCS | Mod: S$GLB,,, | Performed by: INTERNAL MEDICINE

## 2019-12-31 PROCEDURE — 99214 OFFICE O/P EST MOD 30 MIN: CPT | Mod: S$GLB,,, | Performed by: INTERNAL MEDICINE

## 2019-12-31 PROCEDURE — 93005 RHYTHM STRIP: ICD-10-PCS | Mod: S$GLB,,, | Performed by: INTERNAL MEDICINE

## 2019-12-31 PROCEDURE — 93005 ELECTROCARDIOGRAM TRACING: CPT | Mod: S$GLB,,, | Performed by: INTERNAL MEDICINE

## 2019-12-31 PROCEDURE — 93284 PRGRMG EVAL IMPLANTABLE DFB: CPT

## 2019-12-31 NOTE — PROGRESS NOTES
EP  Cardiology Clinic Note  Reason for Visit    : ICD management   Referring provider : Dr Nair   HPI:     Tim Richards 55 yo AAM with DCM,(EF 10%, grade III DD) s/p HM II with Outflow graft changed (03/9/18) as DT, BiV-Icd Medtronic (2014), HtN, obesity comes in the Ep clinic for follow up of VT and ICD management.    He was admitted to the hospital in 10/2019 with ICD shocks. When he came to the ER he was noted to be in MMVT. He had multiple rounds of ATP that degenerated the MMVT into VF and the he was unsuccessfully shocked multiple times . He was eventually shocked externally to convert into sinus rhythm .  It was postulated reasons for ineffective therapy (had worsening hypervolemia week prior, length of time in the arrhythmia prior to ICD shock, IV amiodarone causing higher defibrillatory tissue threshold, or change in heart-shock vector post LVAD implant). He had a NIPS which showed successful DFT at 35J. He was discharge after being diuresed. Since discharge, he has done well clinically .     Device check noted on 12/14/19 - one episode of MMVT 270 ms that was appropriately successfully shocked. He has had multiple non sustained slower VT. Of note is he had a monitoring zone added during his admission at 133. VT 1 zone at 167 does not have ATp and goes directly to 35J shock. We will add 6 rounds of ATP before shock in that zone. (refer to device interrogation in media). Device longevity - 4 months to COURTNEY.     TTE demonstrated EF >10%, AV does not open, IVF dilated 2.5cm with respiratory variation >50%, Dilated RV / LV.   EKG: AsVp @ 60.       ROS:    Constitution: Negative for fever, chills, weight loss or gain.   HENT: Negative for sore throat, rhinorrhea, or headache.  Eyes: Negative for blurred or double vision.   Cardiovascular: denies any CP, LOC, ICD shock as above   Pulmonary:  Mild chronic SOB   Gastrointestinal: Negative for abdominal pain, nausea, vomiting, or diarrhea.   :  Negative for dysuria.   Neurological: Negative for focal weakness or sensory changes.  Rest of the comprehensive ros negative apart from what has been said above   PMH:     Past Medical History:   Diagnosis Date    CHF (congestive heart failure)     Dilated cardiomyopathy 1/10/2018    Disorder of kidney and ureter     CKD    Gout     HTN (hypertension)     Ventricular tachycardia (paroxysmal)      Past Surgical History:   Procedure Laterality Date    CARDIAC CATHETERIZATION  Dec. 2012    CARDIAC DEFIBRILLATOR PLACEMENT Left     CRRT-D    COLONOSCOPY N/A 3/6/2018    Procedure: COLONOSCOPY;  Surgeon: Alonso Bone MD;  Location: [facility] ENDO (2ND FLR);  Service: Endoscopy;  Laterality: N/A;    COLONOSCOPY N/A 7/17/2019    Procedure: COLONOSCOPY;  Surgeon: Blane Valdez MD;  Location: [facility] ENDO (2ND FLR);  Service: Endoscopy;  Laterality: N/A;    COLONOSCOPY N/A 7/18/2019    Procedure: COLONOSCOPY;  Surgeon: Blane Valdez MD;  Location: [facility] ENDO (2ND FLR);  Service: Endoscopy;  Laterality: N/A;    ESOPHAGOGASTRODUODENOSCOPY N/A 7/17/2019    Procedure: EGD (ESOPHAGOGASTRODUODENOSCOPY);  Surgeon: Blane Valdez MD;  Location: [facility] (2ND FLR);  Service: Endoscopy;  Laterality: N/A;    ESOPHAGOGASTRODUODENOSCOPY N/A 7/18/2019    Procedure: EGD (ESOPHAGOGASTRODUODENOSCOPY);  Surgeon: Blane Valdez MD;  Location: [facility] (2ND FLR);  Service: Endoscopy;  Laterality: N/A;    NONINVASIVE CARDIAC ELECTROPHYSIOLOGY STUDY N/A 10/18/2019    Procedure: CARDIAC ELECTROPHYSIOLOGY STUDY, NONINVASIVE;  Surgeon: Raz Wagner MD;  Location: [facility] EP LAB;  Service: Cardiology;  Laterality: N/A;  VT, DFTs, MDT CRTD in situ, LVAD, anes, MB, 3098    TREATMENT OF CARDIAC ARRHYTHMIA  10/18/2019    Procedure: Cardioversion or Defibrillation;  Surgeon: Raz Wagner MD;  Location: [facility] EP LAB;  Service: Cardiology;;     Allergies:     Review of patient's allergies indicates:   Allergen Reactions    Lisinopril Anaphylaxis      Medications:     Current Outpatient Medications on File Prior to Visit   Medication Sig Dispense Refill    allopurinol (ZYLOPRIM) 100 MG tablet TAKE 1 TABLET BY MOUTH EVERY DAY 30 tablet 5    amiodarone (PACERONE) 400 MG tablet Take 1 tablet (400 mg total) by mouth once daily. 90 tablet 3    amLODIPine (NORVASC) 10 MG tablet Take 1 tablet (10 mg total) by mouth once daily. 30 tablet 11    doxazosin (CARDURA) 8 MG Tab Take 1 tablet (8 mg total) by mouth every 12 (twelve) hours. 60 tablet 11    ferrous gluconate (FERGON) 324 MG tablet Take 1 tablet (324 mg total) by mouth daily with breakfast. 90 tablet 6    magnesium oxide (MAG-OX) 400 mg (241.3 mg magnesium) tablet TAKE 1 TABLET (400 MG TOTAL) BY MOUTH 3 (THREE) TIMES DAILY. 90 tablet 6    mesalamine (PENTASA) 250 mg CpSR Take 4 capsules (1,000 mg total) by mouth 4 (four) times daily. 480 capsule 2    methIMAzole (TAPAZOLE) 10 MG Tab TAKE 2 TABLETS BY MOUTH EVERY MORNING AND TAKE 1 TABLET EVERY EVENING 90 tablet 1    pantoprazole (PROTONIX) 40 MG tablet TAKE 1 TABLET (40 MG TOTAL) BY MOUTH ONCE DAILY. 30 tablet 11    potassium chloride (K-TAB) 20 mEq Take 20 mEq (1 tab) twice daily on M-W-F. Take 20 mEq (1 tab) once daily on Tu-Th-Sat-Geller 80 tablet 11    predniSONE (DELTASONE) 20 MG tablet Take 3 tablets (60 mg total) by mouth once daily. 60 tablet 2    sildenafil (VIAGRA) 100 MG tablet Take 1 tablet (100 mg total) by mouth daily as needed for Erectile Dysfunction. 10 tablet 1    spironolactone (ALDACTONE) 25 MG tablet Take 1 tablet (25 mg total) by mouth once daily. 30 tablet 11    torsemide (DEMADEX) 20 MG Tab Take 40 mg (2 tabs) twice a day on M-W-F. Take 40 mg (2 tabs) once a day Yr-Dc-Yl-Sat 80 tablet 11    warfarin (COUMADIN) 5 MG tablet Take 1.5 tabs by mouth on 7/23 only, followed by weekly dose of 1 tablet daily, except 1.5 tabs on Mon, Wed, Fri 45 tablet 11     No current facility-administered medications on file prior to visit.   "    Social History:     Social History     Tobacco Use    Smoking status: Former Smoker     Packs/day: 1.00     Years: 31.00     Pack years: 31.00     Types: Cigarettes     Last attempt to quit: 2018     Years since quittin.9    Smokeless tobacco: Never Used    Tobacco comment: pt is quiting on his own - pt stated not qualified for program;  pt  quit on his own   Substance Use Topics    Alcohol use: No     Alcohol/week: 0.0 standard drinks     Comment: one fifth of gin or rum/week     Family History:     Family History   Problem Relation Age of Onset    Hypertension Father     Diabetes Father     Coronary artery disease Father     Heart disease Father         CHF    No Known Problems Mother     Cancer Sister 54        breast CA    No Known Problems Brother      Physical Exam:   Ht 6' 1" (1.854 m)   Wt 124.7 kg (274 lb 14.6 oz)   BMI 36.27 kg/m²        Physical Exam   Constitutional: He is oriented to person, place, and time. He appears well-developed and well-nourished.   HENT:   Head: Normocephalic and atraumatic.   Cardiovascular: Normal rate and regular rhythm.   Pulmonary/Chest: Effort normal and breath sounds normal. No respiratory distress.   Abdominal: Soft. Bowel sounds are normal. He exhibits no distension.   Musculoskeletal: He exhibits no edema. LVAD in place  Neurological: He is alert and oriented to person, place, and time.   Skin: Skin is warm and dry.   Psychiatric: He has a normal mood and affect. His behavior is normal.   Vitals reviewed.    Labs:     Lab Results   Component Value Date     2019    K 4.3 2019    CL 99 (L) 2019    CO2 26 2019    BUN 24 (H) 2019    CREATININE 1.7 (H) 2019    ANIONGAP 11 2019     Lab Results   Component Value Date    HGBA1C 5.5 2018     Lab Results   Component Value Date     (H) 2019     (H) 2019     (H) 10/28/2019    Lab Results   Component Value Date    WBC " 8.01 12/05/2019    HGB 13.4 (L) 12/05/2019    HCT 44.1 12/05/2019    HCT 26 (L) 03/10/2018     12/05/2019    GRAN 7.3 12/05/2019    GRAN 91.1 (H) 12/05/2019     Lab Results   Component Value Date    CHOL 150 11/07/2018    HDL 85 (H) 11/07/2018    LDLCALC 53.0 (L) 11/07/2018    TRIG 60 11/07/2018              Assessment:        1. VT (ventricular tachycardia)    2. LVAD (left ventricular assist device) present    3. Long term (current) use of anticoagulants    4. DCM (dilated cardiomyopathy)    5. Biventricular implantable cardioverter-defibrillator in situ        Plan:       Tim Richards 55 yo AAM with DCM,(EF 10%, grade III DD) s/p HM II with Outflow graft changed (03/9/18) as DT, BiV-Icd Medtronic (2014), HtN, obesity comes in the Ep clinic for follow up of VT and ICD management.  He was admitted to the hospital in 10/2019 with unsuccessful ICD shocks (in the context of volume overload and multiple IV amiodarone loading ). Follow up NIPS showed successful DFT at 35J. He was discharge after being diuresed. He is on PO amiodarone for maintainance therapy.  He had one episode of MMVT 270 ms that was appropriately successfully treated on 12/14/19. Overall feeling better clinically.     -device reprogrammed - added ATP to VT1 zone.   -cont amiodarone  -Device longevity - 4 months. F/u in device clinic monthly till COURTNEY. Then we will plan a generator change. Discussed risks benefits and alternatives in detail, and he agrees with the plan.       Signed:  Sole Torres  [id number], EP Fellow

## 2019-12-31 NOTE — PROGRESS NOTES
Wife questioned and pt taken 2.5 mg 12/14 but reports taking 5 mg daily since then no other changes reported .

## 2020-01-03 NOTE — PROGRESS NOTES
Seen pt in clinic, conducted yearly preventive maintenance on Power Module/ charged backup controller/ passed self test

## 2020-01-11 ENCOUNTER — HOSPITAL ENCOUNTER (INPATIENT)
Facility: HOSPITAL | Age: 55
LOS: 9 days | Discharge: HOME | DRG: 308 | End: 2020-01-20
Attending: EMERGENCY MEDICINE | Admitting: INTERNAL MEDICINE
Payer: COMMERCIAL

## 2020-01-11 DIAGNOSIS — I49.01 VF (VENTRICULAR FIBRILLATION): ICD-10-CM

## 2020-01-11 DIAGNOSIS — I47.20 VT (VENTRICULAR TACHYCARDIA): ICD-10-CM

## 2020-01-11 DIAGNOSIS — I49.9 PMT (PACEMAKER-MEDIATED TACHYCARDIA): ICD-10-CM

## 2020-01-11 DIAGNOSIS — R07.9 CHEST PAIN: ICD-10-CM

## 2020-01-11 DIAGNOSIS — Z95.0 PACEMAKER: ICD-10-CM

## 2020-01-11 DIAGNOSIS — I47.20 VENTRICULAR TACHYCARDIA: ICD-10-CM

## 2020-01-11 DIAGNOSIS — I47.21 TORSADES DE POINTES: Primary | ICD-10-CM

## 2020-01-11 DIAGNOSIS — E05.90 HYPERTHYROIDISM: ICD-10-CM

## 2020-01-11 DIAGNOSIS — I49.9 ARRHYTHMIA: ICD-10-CM

## 2020-01-11 DIAGNOSIS — I50.9 CHF (CONGESTIVE HEART FAILURE): ICD-10-CM

## 2020-01-11 DIAGNOSIS — Z95.811 PRESENCE OF LEFT VENTRICULAR ASSIST DEVICE (LVAD): ICD-10-CM

## 2020-01-11 DIAGNOSIS — I50.23 ACUTE ON CHRONIC SYSTOLIC CONGESTIVE HEART FAILURE: ICD-10-CM

## 2020-01-11 DIAGNOSIS — I49.01 VENTRICULAR FIBRILLATION: ICD-10-CM

## 2020-01-11 DIAGNOSIS — Z95.811 LVAD (LEFT VENTRICULAR ASSIST DEVICE) PRESENT: ICD-10-CM

## 2020-01-11 LAB
ALBUMIN SERPL BCP-MCNC: 3.9 G/DL (ref 3.5–5.2)
ALP SERPL-CCNC: 130 U/L (ref 55–135)
ALT SERPL W/O P-5'-P-CCNC: 40 U/L (ref 10–44)
ANION GAP SERPL CALC-SCNC: 17 MMOL/L (ref 8–16)
ANISOCYTOSIS BLD QL SMEAR: SLIGHT
APTT BLDCRRT: 59 SEC (ref 21–32)
AST SERPL-CCNC: 25 U/L (ref 10–40)
BASOPHILS NFR BLD: 0 % (ref 0–1.9)
BILIRUB SERPL-MCNC: 0.4 MG/DL (ref 0.1–1)
BNP SERPL-MCNC: 522 PG/ML (ref 0–99)
BUN SERPL-MCNC: 24 MG/DL (ref 6–20)
CALCIUM SERPL-MCNC: 9.9 MG/DL (ref 8.7–10.5)
CHLORIDE SERPL-SCNC: 97 MMOL/L (ref 95–110)
CO2 SERPL-SCNC: 24 MMOL/L (ref 23–29)
CREAT SERPL-MCNC: 1.9 MG/DL (ref 0.5–1.4)
DIFFERENTIAL METHOD: ABNORMAL
EOSINOPHIL NFR BLD: 0 % (ref 0–8)
ERYTHROCYTE [DISTWIDTH] IN BLOOD BY AUTOMATED COUNT: 14.6 % (ref 11.5–14.5)
EST. GFR  (AFRICAN AMERICAN): 46 ML/MIN/1.73 M^2
EST. GFR  (NON AFRICAN AMERICAN): 39 ML/MIN/1.73 M^2
GIANT PLATELETS BLD QL SMEAR: PRESENT
GLUCOSE SERPL-MCNC: 228 MG/DL (ref 70–110)
HCT VFR BLD AUTO: 46.8 % (ref 40–54)
HGB BLD-MCNC: 14 G/DL (ref 14–18)
IMM GRANULOCYTES # BLD AUTO: ABNORMAL K/UL (ref 0–0.04)
IMM GRANULOCYTES NFR BLD AUTO: ABNORMAL % (ref 0–0.5)
INR PPP: 5.3 (ref 0.8–1.2)
LYMPHOCYTES NFR BLD: 18 % (ref 18–48)
MCH RBC QN AUTO: 25.5 PG (ref 27–31)
MCHC RBC AUTO-ENTMCNC: 29.9 G/DL (ref 32–36)
MCV RBC AUTO: 85 FL (ref 82–98)
MONOCYTES NFR BLD: 6 % (ref 4–15)
NEUTROPHILS NFR BLD: 76 % (ref 38–73)
NRBC BLD-RTO: 0 /100 WBC
OVALOCYTES BLD QL SMEAR: ABNORMAL
PLATELET # BLD AUTO: 266 K/UL (ref 150–350)
PLATELET BLD QL SMEAR: ABNORMAL
PMV BLD AUTO: 10.3 FL (ref 9.2–12.9)
POIKILOCYTOSIS BLD QL SMEAR: SLIGHT
POLYCHROMASIA BLD QL SMEAR: ABNORMAL
POTASSIUM SERPL-SCNC: 3.4 MMOL/L (ref 3.5–5.1)
PROT SERPL-MCNC: 7.7 G/DL (ref 6–8.4)
PROTHROMBIN TIME: 55.8 SEC (ref 9–12.5)
RBC # BLD AUTO: 5.48 M/UL (ref 4.6–6.2)
SODIUM SERPL-SCNC: 138 MMOL/L (ref 136–145)
T4 FREE SERPL-MCNC: 3.91 NG/DL (ref 0.71–1.51)
TROPONIN I SERPL DL<=0.01 NG/ML-MCNC: 0.07 NG/ML (ref 0–0.03)
TSH SERPL DL<=0.005 MIU/L-ACNC: <0.01 UIU/ML (ref 0.4–4)
WBC # BLD AUTO: 7.71 K/UL (ref 3.9–12.7)

## 2020-01-11 PROCEDURE — 85007 BL SMEAR W/DIFF WBC COUNT: CPT

## 2020-01-11 PROCEDURE — 84484 ASSAY OF TROPONIN QUANT: CPT | Mod: 91

## 2020-01-11 PROCEDURE — 63600175 PHARM REV CODE 636 W HCPCS: Performed by: EMERGENCY MEDICINE

## 2020-01-11 PROCEDURE — 12000002 HC ACUTE/MED SURGE SEMI-PRIVATE ROOM

## 2020-01-11 PROCEDURE — 63600175 PHARM REV CODE 636 W HCPCS

## 2020-01-11 PROCEDURE — 93010 ELECTROCARDIOGRAM REPORT: CPT | Mod: ,,, | Performed by: INTERNAL MEDICINE

## 2020-01-11 PROCEDURE — 84443 ASSAY THYROID STIM HORMONE: CPT

## 2020-01-11 PROCEDURE — 96375 TX/PRO/DX INJ NEW DRUG ADDON: CPT

## 2020-01-11 PROCEDURE — 84439 ASSAY OF FREE THYROXINE: CPT

## 2020-01-11 PROCEDURE — 80053 COMPREHEN METABOLIC PANEL: CPT

## 2020-01-11 PROCEDURE — 99900035 HC TECH TIME PER 15 MIN (STAT)

## 2020-01-11 PROCEDURE — 93010 EKG 12-LEAD: ICD-10-PCS | Mod: 76,,, | Performed by: INTERNAL MEDICINE

## 2020-01-11 PROCEDURE — 93010 ELECTROCARDIOGRAM REPORT: CPT | Mod: 76,,, | Performed by: INTERNAL MEDICINE

## 2020-01-11 PROCEDURE — 27000221 HC OXYGEN, UP TO 24 HOURS

## 2020-01-11 PROCEDURE — 96365 THER/PROPH/DIAG IV INF INIT: CPT

## 2020-01-11 PROCEDURE — 93005 ELECTROCARDIOGRAM TRACING: CPT

## 2020-01-11 PROCEDURE — 85730 THROMBOPLASTIN TIME PARTIAL: CPT

## 2020-01-11 PROCEDURE — 99153 MOD SED SAME PHYS/QHP EA: CPT

## 2020-01-11 PROCEDURE — 84484 ASSAY OF TROPONIN QUANT: CPT

## 2020-01-11 PROCEDURE — 83735 ASSAY OF MAGNESIUM: CPT

## 2020-01-11 PROCEDURE — 27000248 HC VAD-ADDITIONAL DAY

## 2020-01-11 PROCEDURE — 94770 HC EXHALED C02 TEST: CPT

## 2020-01-11 PROCEDURE — 85027 COMPLETE CBC AUTOMATED: CPT

## 2020-01-11 PROCEDURE — 85610 PROTHROMBIN TIME: CPT

## 2020-01-11 PROCEDURE — 99291 CRITICAL CARE FIRST HOUR: CPT | Mod: 25

## 2020-01-11 PROCEDURE — 92960 CARDIOVERSION ELECTRIC EXT: CPT

## 2020-01-11 PROCEDURE — 83880 ASSAY OF NATRIURETIC PEPTIDE: CPT

## 2020-01-11 RX ORDER — PROPOFOL 10 MG/ML
0.5 VIAL (ML) INTRAVENOUS
Status: COMPLETED | OUTPATIENT
Start: 2020-01-11 | End: 2020-01-11

## 2020-01-11 RX ORDER — POTASSIUM CHLORIDE 7.45 MG/ML
10 INJECTION INTRAVENOUS ONCE
Status: COMPLETED | OUTPATIENT
Start: 2020-01-11 | End: 2020-01-12

## 2020-01-11 RX ORDER — POTASSIUM CHLORIDE 7.45 MG/ML
10 INJECTION INTRAVENOUS ONCE
Status: COMPLETED | OUTPATIENT
Start: 2020-01-12 | End: 2020-01-12

## 2020-01-11 RX ORDER — ONDANSETRON 2 MG/ML
4 INJECTION INTRAMUSCULAR; INTRAVENOUS
Status: COMPLETED | OUTPATIENT
Start: 2020-01-11 | End: 2020-01-11

## 2020-01-11 RX ORDER — MORPHINE SULFATE 10 MG/ML
4 INJECTION INTRAMUSCULAR; INTRAVENOUS; SUBCUTANEOUS
Status: COMPLETED | OUTPATIENT
Start: 2020-01-11 | End: 2020-01-11

## 2020-01-11 RX ORDER — LORAZEPAM/0.9% SODIUM CHLORIDE 100MG/0.1L
2 PLASTIC BAG, INJECTION (ML) INTRAVENOUS ONCE
Status: COMPLETED | OUTPATIENT
Start: 2020-01-11 | End: 2020-01-11

## 2020-01-11 RX ORDER — FENTANYL CITRATE 50 UG/ML
100 INJECTION, SOLUTION INTRAMUSCULAR; INTRAVENOUS
Status: COMPLETED | OUTPATIENT
Start: 2020-01-11 | End: 2020-01-11

## 2020-01-11 RX ORDER — LORAZEPAM/0.9% SODIUM CHLORIDE 100MG/0.1L
2 PLASTIC BAG, INJECTION (ML) INTRAVENOUS ONCE
Status: COMPLETED | OUTPATIENT
Start: 2020-01-12 | End: 2020-01-12

## 2020-01-11 RX ORDER — PROPOFOL 10 MG/ML
INJECTION, EMULSION INTRAVENOUS
Status: COMPLETED
Start: 2020-01-11 | End: 2020-01-11

## 2020-01-11 RX ORDER — FUROSEMIDE 10 MG/ML
100 INJECTION INTRAMUSCULAR; INTRAVENOUS ONCE
Status: COMPLETED | OUTPATIENT
Start: 2020-01-11 | End: 2020-01-12

## 2020-01-11 RX ADMIN — POTASSIUM CHLORIDE 10 MEQ: 7.46 INJECTION, SOLUTION INTRAVENOUS at 11:01

## 2020-01-11 RX ADMIN — AMIODARONE HYDROCHLORIDE 1 MG/MIN: 1.8 INJECTION, SOLUTION INTRAVENOUS at 09:01

## 2020-01-11 RX ADMIN — PROPOFOL 31 MG: 10 INJECTION, EMULSION INTRAVENOUS at 11:01

## 2020-01-11 RX ADMIN — ONDANSETRON HYDROCHLORIDE 4 MG: 2 SOLUTION INTRAMUSCULAR; INTRAVENOUS at 09:01

## 2020-01-11 RX ADMIN — AMIODARONE HYDROCHLORIDE 150 MG: 1.5 INJECTION, SOLUTION INTRAVENOUS at 09:01

## 2020-01-11 RX ADMIN — FENTANYL CITRATE 100 MCG: 50 INJECTION INTRAMUSCULAR; INTRAVENOUS at 11:01

## 2020-01-11 RX ADMIN — Medication 31 MG: at 11:01

## 2020-01-11 RX ADMIN — MAGNESIUM SULFATE IN WATER 2 G: 40 INJECTION, SOLUTION INTRAVENOUS at 11:01

## 2020-01-11 RX ADMIN — MORPHINE SULFATE 4 MG: 10 INJECTION INTRAVENOUS at 09:01

## 2020-01-12 PROBLEM — Z95.811 PRESENCE OF LEFT VENTRICULAR ASSIST DEVICE (LVAD): Status: ACTIVE | Noted: 2020-01-12

## 2020-01-12 LAB
ANION GAP SERPL CALC-SCNC: 12 MMOL/L (ref 8–16)
ANION GAP SERPL CALC-SCNC: 9 MMOL/L (ref 8–16)
APTT BLDCRRT: 43.4 SEC (ref 21–32)
BASOPHILS # BLD AUTO: 0.01 K/UL (ref 0–0.2)
BASOPHILS NFR BLD: 0.1 % (ref 0–1.9)
BUN SERPL-MCNC: 22 MG/DL (ref 6–20)
BUN SERPL-MCNC: 25 MG/DL (ref 6–20)
CALCIUM SERPL-MCNC: 9.4 MG/DL (ref 8.7–10.5)
CALCIUM SERPL-MCNC: 9.6 MG/DL (ref 8.7–10.5)
CHLORIDE SERPL-SCNC: 100 MMOL/L (ref 95–110)
CHLORIDE SERPL-SCNC: 99 MMOL/L (ref 95–110)
CO2 SERPL-SCNC: 27 MMOL/L (ref 23–29)
CO2 SERPL-SCNC: 29 MMOL/L (ref 23–29)
CREAT SERPL-MCNC: 1.7 MG/DL (ref 0.5–1.4)
CREAT SERPL-MCNC: 1.9 MG/DL (ref 0.5–1.4)
DIFFERENTIAL METHOD: ABNORMAL
EOSINOPHIL # BLD AUTO: 0 K/UL (ref 0–0.5)
EOSINOPHIL NFR BLD: 0.1 % (ref 0–8)
ERYTHROCYTE [DISTWIDTH] IN BLOOD BY AUTOMATED COUNT: 14.6 % (ref 11.5–14.5)
EST. GFR  (AFRICAN AMERICAN): 45.5 ML/MIN/1.73 M^2
EST. GFR  (AFRICAN AMERICAN): 52.1 ML/MIN/1.73 M^2
EST. GFR  (NON AFRICAN AMERICAN): 39.4 ML/MIN/1.73 M^2
EST. GFR  (NON AFRICAN AMERICAN): 45 ML/MIN/1.73 M^2
GLUCOSE SERPL-MCNC: 125 MG/DL (ref 70–110)
GLUCOSE SERPL-MCNC: 184 MG/DL (ref 70–110)
HCT VFR BLD AUTO: 42.1 % (ref 40–54)
HGB BLD-MCNC: 12.7 G/DL (ref 14–18)
IMM GRANULOCYTES # BLD AUTO: 0.04 K/UL (ref 0–0.04)
IMM GRANULOCYTES NFR BLD AUTO: 0.4 % (ref 0–0.5)
INR PPP: 5 (ref 0.8–1.2)
INR PPP: 5.1 (ref 0.8–1.2)
LDH SERPL L TO P-CCNC: 312 U/L (ref 110–260)
LYMPHOCYTES # BLD AUTO: 0.7 K/UL (ref 1–4.8)
LYMPHOCYTES NFR BLD: 6.8 % (ref 18–48)
MAGNESIUM SERPL-MCNC: 1.8 MG/DL (ref 1.6–2.6)
MAGNESIUM SERPL-MCNC: 2.6 MG/DL (ref 1.6–2.6)
MCH RBC QN AUTO: 26.1 PG (ref 27–31)
MCHC RBC AUTO-ENTMCNC: 30.2 G/DL (ref 32–36)
MCV RBC AUTO: 86 FL (ref 82–98)
MONOCYTES # BLD AUTO: 1.2 K/UL (ref 0.3–1)
MONOCYTES NFR BLD: 12 % (ref 4–15)
NEUTROPHILS # BLD AUTO: 7.7 K/UL (ref 1.8–7.7)
NEUTROPHILS NFR BLD: 80.6 % (ref 38–73)
NRBC BLD-RTO: 0 /100 WBC
PHOSPHATE SERPL-MCNC: 4.2 MG/DL (ref 2.7–4.5)
PLATELET # BLD AUTO: 228 K/UL (ref 150–350)
PMV BLD AUTO: 10.3 FL (ref 9.2–12.9)
POTASSIUM SERPL-SCNC: 3.3 MMOL/L (ref 3.5–5.1)
POTASSIUM SERPL-SCNC: 4.3 MMOL/L (ref 3.5–5.1)
PROTHROMBIN TIME: 48.1 SEC (ref 9–12.5)
PROTHROMBIN TIME: 48.4 SEC (ref 9–12.5)
RBC # BLD AUTO: 4.87 M/UL (ref 4.6–6.2)
SODIUM SERPL-SCNC: 138 MMOL/L (ref 136–145)
SODIUM SERPL-SCNC: 138 MMOL/L (ref 136–145)
TROPONIN I SERPL DL<=0.01 NG/ML-MCNC: 0.08 NG/ML (ref 0–0.03)
WBC # BLD AUTO: 9.59 K/UL (ref 3.9–12.7)

## 2020-01-12 PROCEDURE — 85025 COMPLETE CBC W/AUTO DIFF WBC: CPT

## 2020-01-12 PROCEDURE — 83615 LACTATE (LD) (LDH) ENZYME: CPT

## 2020-01-12 PROCEDURE — 85730 THROMBOPLASTIN TIME PARTIAL: CPT

## 2020-01-12 PROCEDURE — 84100 ASSAY OF PHOSPHORUS: CPT

## 2020-01-12 PROCEDURE — 63600175 PHARM REV CODE 636 W HCPCS: Performed by: STUDENT IN AN ORGANIZED HEALTH CARE EDUCATION/TRAINING PROGRAM

## 2020-01-12 PROCEDURE — 93750 PR INTERROGATE VENT ASSIST DEV, IN PERSON, W PHYSICIAN ANALYSIS: ICD-10-PCS | Mod: ,,, | Performed by: INTERNAL MEDICINE

## 2020-01-12 PROCEDURE — 94761 N-INVAS EAR/PLS OXIMETRY MLT: CPT

## 2020-01-12 PROCEDURE — 25000003 PHARM REV CODE 250: Performed by: EMERGENCY MEDICINE

## 2020-01-12 PROCEDURE — 99233 PR SUBSEQUENT HOSPITAL CARE,LEVL III: ICD-10-PCS | Mod: ,,, | Performed by: INTERNAL MEDICINE

## 2020-01-12 PROCEDURE — 83735 ASSAY OF MAGNESIUM: CPT | Mod: 91

## 2020-01-12 PROCEDURE — 63600175 PHARM REV CODE 636 W HCPCS: Performed by: INTERNAL MEDICINE

## 2020-01-12 PROCEDURE — 63600175 PHARM REV CODE 636 W HCPCS

## 2020-01-12 PROCEDURE — 85610 PROTHROMBIN TIME: CPT | Mod: 91

## 2020-01-12 PROCEDURE — 25000003 PHARM REV CODE 250: Performed by: STUDENT IN AN ORGANIZED HEALTH CARE EDUCATION/TRAINING PROGRAM

## 2020-01-12 PROCEDURE — 80048 BASIC METABOLIC PNL TOTAL CA: CPT

## 2020-01-12 PROCEDURE — 27000685 HC LVAD KIT (30 DAY SUPPLY)

## 2020-01-12 PROCEDURE — 27000248 HC VAD-ADDITIONAL DAY

## 2020-01-12 PROCEDURE — 20000000 HC ICU ROOM

## 2020-01-12 PROCEDURE — 99291 PR CRITICAL CARE, E/M 30-74 MINUTES: ICD-10-PCS | Mod: ,,, | Performed by: INTERNAL MEDICINE

## 2020-01-12 PROCEDURE — 99233 SBSQ HOSP IP/OBS HIGH 50: CPT | Mod: ,,, | Performed by: INTERNAL MEDICINE

## 2020-01-12 PROCEDURE — 99291 CRITICAL CARE FIRST HOUR: CPT | Mod: ,,, | Performed by: INTERNAL MEDICINE

## 2020-01-12 PROCEDURE — 83735 ASSAY OF MAGNESIUM: CPT

## 2020-01-12 PROCEDURE — 80048 BASIC METABOLIC PNL TOTAL CA: CPT | Mod: 91

## 2020-01-12 PROCEDURE — 93750 INTERROGATION VAD IN PERSON: CPT | Mod: ,,, | Performed by: INTERNAL MEDICINE

## 2020-01-12 PROCEDURE — 63600175 PHARM REV CODE 636 W HCPCS: Performed by: EMERGENCY MEDICINE

## 2020-01-12 RX ORDER — PANTOPRAZOLE SODIUM 40 MG/1
40 TABLET, DELAYED RELEASE ORAL DAILY
Status: DISCONTINUED | OUTPATIENT
Start: 2020-01-12 | End: 2020-01-20 | Stop reason: HOSPADM

## 2020-01-12 RX ORDER — DOXAZOSIN 4 MG/1
8 TABLET ORAL EVERY 12 HOURS
Status: DISCONTINUED | OUTPATIENT
Start: 2020-01-12 | End: 2020-01-20 | Stop reason: HOSPADM

## 2020-01-12 RX ORDER — POTASSIUM CHLORIDE 7.45 MG/ML
10 INJECTION INTRAVENOUS ONCE
Status: DISCONTINUED | OUTPATIENT
Start: 2020-01-12 | End: 2020-01-12

## 2020-01-12 RX ORDER — MIDAZOLAM HYDROCHLORIDE 1 MG/ML
INJECTION INTRAMUSCULAR; INTRAVENOUS
Status: COMPLETED
Start: 2020-01-12 | End: 2020-01-12

## 2020-01-12 RX ORDER — ACETAMINOPHEN 325 MG/1
650 TABLET ORAL EVERY 6 HOURS PRN
Status: DISCONTINUED | OUTPATIENT
Start: 2020-01-12 | End: 2020-01-12

## 2020-01-12 RX ORDER — OXYCODONE HYDROCHLORIDE 5 MG/1
10 TABLET ORAL EVERY 6 HOURS PRN
Status: DISCONTINUED | OUTPATIENT
Start: 2020-01-12 | End: 2020-01-20 | Stop reason: HOSPADM

## 2020-01-12 RX ORDER — LANOLIN ALCOHOL/MO/W.PET/CERES
400 CREAM (GRAM) TOPICAL 3 TIMES DAILY
Status: DISCONTINUED | OUTPATIENT
Start: 2020-01-12 | End: 2020-01-20 | Stop reason: HOSPADM

## 2020-01-12 RX ORDER — FERROUS GLUCONATE 324(37.5)
324 TABLET ORAL
Status: DISCONTINUED | OUTPATIENT
Start: 2020-01-12 | End: 2020-01-20 | Stop reason: HOSPADM

## 2020-01-12 RX ORDER — ONDANSETRON 4 MG/1
4 TABLET, ORALLY DISINTEGRATING ORAL EVERY 6 HOURS PRN
Status: DISCONTINUED | OUTPATIENT
Start: 2020-01-12 | End: 2020-01-20 | Stop reason: HOSPADM

## 2020-01-12 RX ORDER — POTASSIUM CHLORIDE 750 MG/1
10 CAPSULE, EXTENDED RELEASE ORAL ONCE
Status: COMPLETED | OUTPATIENT
Start: 2020-01-12 | End: 2020-01-12

## 2020-01-12 RX ORDER — PREDNISONE 20 MG/1
60 TABLET ORAL DAILY
Status: DISCONTINUED | OUTPATIENT
Start: 2020-01-12 | End: 2020-01-20 | Stop reason: HOSPADM

## 2020-01-12 RX ORDER — ACETAMINOPHEN 325 MG/1
650 TABLET ORAL EVERY 6 HOURS PRN
Status: DISCONTINUED | OUTPATIENT
Start: 2020-01-12 | End: 2020-01-20 | Stop reason: HOSPADM

## 2020-01-12 RX ORDER — OXYCODONE HYDROCHLORIDE 5 MG/1
10 TABLET ORAL EVERY 6 HOURS PRN
Status: DISCONTINUED | OUTPATIENT
Start: 2020-01-12 | End: 2020-01-12

## 2020-01-12 RX ORDER — POTASSIUM CHLORIDE 20 MEQ/1
60 TABLET, EXTENDED RELEASE ORAL ONCE
Status: COMPLETED | OUTPATIENT
Start: 2020-01-12 | End: 2020-01-12

## 2020-01-12 RX ORDER — ALLOPURINOL 100 MG/1
100 TABLET ORAL DAILY
Status: DISCONTINUED | OUTPATIENT
Start: 2020-01-12 | End: 2020-01-20 | Stop reason: HOSPADM

## 2020-01-12 RX ORDER — POTASSIUM CHLORIDE 20 MEQ/1
40 TABLET, EXTENDED RELEASE ORAL ONCE
Status: COMPLETED | OUTPATIENT
Start: 2020-01-12 | End: 2020-01-12

## 2020-01-12 RX ORDER — LIDOCAINE HCL/PF 100 MG/5ML
100 SYRINGE (ML) INTRAVENOUS ONCE
Status: COMPLETED | OUTPATIENT
Start: 2020-01-13 | End: 2020-01-12

## 2020-01-12 RX ORDER — AMLODIPINE BESYLATE 10 MG/1
10 TABLET ORAL DAILY
Status: DISCONTINUED | OUTPATIENT
Start: 2020-01-12 | End: 2020-01-20 | Stop reason: HOSPADM

## 2020-01-12 RX ORDER — LIDOCAINE HYDROCHLORIDE ANHYDROUS AND DEXTROSE MONOHYDRATE .8; 5 G/100ML; G/100ML
1 INJECTION, SOLUTION INTRAVENOUS CONTINUOUS
Status: DISCONTINUED | OUTPATIENT
Start: 2020-01-13 | End: 2020-01-13

## 2020-01-12 RX ADMIN — AMIODARONE HYDROCHLORIDE 1 MG/MIN: 1.8 INJECTION, SOLUTION INTRAVENOUS at 03:01

## 2020-01-12 RX ADMIN — OXYCODONE HYDROCHLORIDE 10 MG: 10 TABLET ORAL at 10:01

## 2020-01-12 RX ADMIN — POTASSIUM CHLORIDE 40 MEQ: 1500 TABLET, EXTENDED RELEASE ORAL at 07:01

## 2020-01-12 RX ADMIN — LIDOCAINE HYDROCHLORIDE 100 MG: 20 INJECTION, SOLUTION INTRAVENOUS at 10:01

## 2020-01-12 RX ADMIN — AMLODIPINE BESYLATE 10 MG: 10 TABLET ORAL at 08:01

## 2020-01-12 RX ADMIN — FERROUS GLUCONATE TAB 324 MG (37.5 MG ELEMENTAL IRON) 324 MG: 324 (37.5 FE) TAB at 11:01

## 2020-01-12 RX ADMIN — Medication 400 MG: at 02:01

## 2020-01-12 RX ADMIN — POTASSIUM CHLORIDE 10 MEQ: 750 CAPSULE, EXTENDED RELEASE ORAL at 03:01

## 2020-01-12 RX ADMIN — ALLOPURINOL 100 MG: 100 TABLET ORAL at 08:01

## 2020-01-12 RX ADMIN — Medication 400 MG: at 08:01

## 2020-01-12 RX ADMIN — PREDNISONE 60 MG: 20 TABLET ORAL at 08:01

## 2020-01-12 RX ADMIN — Medication 400 MG: at 09:01

## 2020-01-12 RX ADMIN — FUROSEMIDE 10 MG/HR: 10 INJECTION, SOLUTION INTRAMUSCULAR; INTRAVENOUS at 09:01

## 2020-01-12 RX ADMIN — AMIODARONE HYDROCHLORIDE 1 MG/MIN: 1.8 INJECTION, SOLUTION INTRAVENOUS at 09:01

## 2020-01-12 RX ADMIN — POTASSIUM CHLORIDE 10 MEQ: 7.46 INJECTION, SOLUTION INTRAVENOUS at 02:01

## 2020-01-12 RX ADMIN — PANTOPRAZOLE SODIUM 40 MG: 40 TABLET, DELAYED RELEASE ORAL at 08:01

## 2020-01-12 RX ADMIN — FUROSEMIDE 100 MG: 10 INJECTION, SOLUTION INTRAMUSCULAR; INTRAVENOUS at 12:01

## 2020-01-12 RX ADMIN — OXYCODONE HYDROCHLORIDE 10 MG: 10 TABLET ORAL at 04:01

## 2020-01-12 RX ADMIN — MAGNESIUM SULFATE IN WATER 2 G: 40 INJECTION, SOLUTION INTRAVENOUS at 12:01

## 2020-01-12 RX ADMIN — AMIODARONE HYDROCHLORIDE 1 MG/MIN: 1.8 INJECTION, SOLUTION INTRAVENOUS at 04:01

## 2020-01-12 RX ADMIN — POTASSIUM CHLORIDE 60 MEQ: 1500 TABLET, EXTENDED RELEASE ORAL at 05:01

## 2020-01-12 RX ADMIN — DOXAZOSIN 8 MG: 4 TABLET ORAL at 09:01

## 2020-01-12 RX ADMIN — OXYCODONE HYDROCHLORIDE 10 MG: 10 TABLET ORAL at 01:01

## 2020-01-12 RX ADMIN — ALLOPURINOL 100 MG: 100 TABLET ORAL at 09:01

## 2020-01-12 RX ADMIN — FUROSEMIDE 10 MG/HR: 10 INJECTION, SOLUTION INTRAMUSCULAR; INTRAVENOUS at 12:01

## 2020-01-12 RX ADMIN — DOXAZOSIN 8 MG: 4 TABLET ORAL at 08:01

## 2020-01-12 RX ADMIN — POTASSIUM CHLORIDE 10 MEQ: 7.46 INJECTION, SOLUTION INTRAVENOUS at 12:01

## 2020-01-12 RX ADMIN — LIDOCAINE HYDROCHLORIDE 1 MG/MIN: 8 INJECTION, SOLUTION INTRAVENOUS at 10:01

## 2020-01-12 NOTE — ED NOTES
Cardiology fellow at the bedside  Sedation drugs, Propofol and fentanyl at the bedside and ready for MD administration. Dr. Beckett at the bedside.

## 2020-01-12 NOTE — ASSESSMENT & PLAN NOTE
Sedated with propofol  Externally defibrillated with 200J to sinus  QTc 500  Continue amio gtt   Diurese aggressively -- lasix 100mg iv x 1 followed by drip at 10/hr  Interrogate device now  Discussed with EP

## 2020-01-12 NOTE — ASSESSMENT & PLAN NOTE
Lasix as described above  Accurate I/o/dw/2gm na  Admit to HTS  Consider AL reduction with hydral/isordil

## 2020-01-12 NOTE — ASSESSMENT & PLAN NOTE
- underwent sedation + cardioversion in ER  - likely exacerbated due to acute decompensated heart failure; will treat underlying etiology  - continue IV amiodarone gtt till load completes, then PO amio 400mg x 2 weeks followed by 400mg daily  - bedside interrogation performed. See report above.   - EP consulted for further adjustment of ICD settings and battery replacement  - routine labs; goal K > 4, Mg > 2

## 2020-01-12 NOTE — ASSESSMENT & PLAN NOTE
Dilated cardiomyopathy  - on last TTE  - presents with elevated BNP, JVD, PND  - s/p IV lasix 100mg followed by lasix gtt 10 mg/hr  - strict in & out  - daily standing weights  - cardiac diet, fluid restrict

## 2020-01-12 NOTE — SUBJECTIVE & OBJECTIVE
Past Medical History:   Diagnosis Date    CHF (congestive heart failure)     Dilated cardiomyopathy 1/10/2018    Disorder of kidney and ureter     CKD    Gout     HTN (hypertension)     Ventricular tachycardia (paroxysmal)        Past Surgical History:   Procedure Laterality Date    CARDIAC CATHETERIZATION  Dec. 2012    CARDIAC DEFIBRILLATOR PLACEMENT Left     CRRT-D    COLONOSCOPY N/A 3/6/2018    Procedure: COLONOSCOPY;  Surgeon: Alonso Bone MD;  Location: [facility] ENDO (2ND FLR);  Service: Endoscopy;  Laterality: N/A;    COLONOSCOPY N/A 7/17/2019    Procedure: COLONOSCOPY;  Surgeon: Blane Valdez MD;  Location: [facility] ENDO (2ND FLR);  Service: Endoscopy;  Laterality: N/A;    COLONOSCOPY N/A 7/18/2019    Procedure: COLONOSCOPY;  Surgeon: Blane Valdez MD;  Location: [facility] ENDO (2ND FLR);  Service: Endoscopy;  Laterality: N/A;    ESOPHAGOGASTRODUODENOSCOPY N/A 7/17/2019    Procedure: EGD (ESOPHAGOGASTRODUODENOSCOPY);  Surgeon: Blane Valdez MD;  Location: [facility] (2ND FLR);  Service: Endoscopy;  Laterality: N/A;    ESOPHAGOGASTRODUODENOSCOPY N/A 7/18/2019    Procedure: EGD (ESOPHAGOGASTRODUODENOSCOPY);  Surgeon: Blane Valdez MD;  Location: [facility] (2ND FLR);  Service: Endoscopy;  Laterality: N/A;    NONINVASIVE CARDIAC ELECTROPHYSIOLOGY STUDY N/A 10/18/2019    Procedure: CARDIAC ELECTROPHYSIOLOGY STUDY, NONINVASIVE;  Surgeon: Raz Wagner MD;  Location: [facility] EP LAB;  Service: Cardiology;  Laterality: N/A;  VT, DFTs, MDT CRTD in situ, LVAD, LASHELL pizarro, 3098    TREATMENT OF CARDIAC ARRHYTHMIA  10/18/2019    Procedure: Cardioversion or Defibrillation;  Surgeon: Raz Wagner MD;  Location: [facility] EP LAB;  Service: Cardiology;;       Review of patient's allergies indicates:   Allergen Reactions    Lisinopril Anaphylaxis       Current Facility-Administered Medications   Medication    acetaminophen tablet 650 mg    allopurinol tablet 100 mg    amiodarone 360 mg/200 mL (1.8 mg/mL) infusion    amLODIPine  tablet 10 mg    doxazosin tablet 8 mg    ferrous gluconate tablet 324 mg    furosemide (LASIX) 2 mg/mL in sodium chloride 0.9% 100 mL infusion (conc: 2 mg/mL)    magnesium oxide tablet 400 mg    ondansetron disintegrating tablet 4 mg    oxyCODONE immediate release tablet 10 mg    pantoprazole EC tablet 40 mg    predniSONE tablet 60 mg     Family History     Problem Relation (Age of Onset)    Cancer Sister (54)    Coronary artery disease Father    Diabetes Father    Heart disease Father    Hypertension Father    No Known Problems Mother, Brother        Tobacco Use    Smoking status: Former Smoker     Packs/day: 1.00     Years: 31.00     Pack years: 31.00     Types: Cigarettes     Last attempt to quit: 2018     Years since quittin.0    Smokeless tobacco: Never Used    Tobacco comment: pt is quiting on his own - pt stated not qualified for program;  pt  quit on his own   Substance and Sexual Activity    Alcohol use: No     Alcohol/week: 0.0 standard drinks     Comment: one fifth of gin or rum/week    Drug use: No    Sexual activity: Yes     Partners: Female     Birth control/protection: None     Comment: 10/5/17  with same partner 7 years      Review of Systems   Constitutional: Positive for appetite change (increased). Negative for activity change (decreased).   Respiratory: Positive for shortness of breath (on exertion). Negative for chest tightness.    Cardiovascular: Negative for chest pain, palpitations and leg swelling.   Gastrointestinal: Negative for constipation, diarrhea, nausea and vomiting.   Genitourinary: Negative for dysuria.   Musculoskeletal: Negative for back pain.   Neurological: Negative for dizziness and headaches.   Psychiatric/Behavioral: Negative for confusion.     Objective:     Vital Signs (Most Recent):  Temp: 98.2 °F (36.8 °C) (20)  Pulse: 75 (20)  Resp: 18 (20)  BP: 102/76 (20)  SpO2: 98 % (20) Vital  Signs (24h Range):  Temp:  [98 °F (36.7 °C)-98.2 °F (36.8 °C)] 98.2 °F (36.8 °C)  Pulse:  [] 75  Resp:  [18-20] 18  SpO2:  [95 %-99 %] 98 %  BP: ()/(53-88) 102/76     Patient Vitals for the past 72 hrs (Last 3 readings):   Weight   01/12/20 0400 121.5 kg (267 lb 13.7 oz)   01/11/20 2246 124.7 kg (274 lb 14.6 oz)     Body mass index is 35.34 kg/m².      Intake/Output Summary (Last 24 hours) at 1/12/2020 0440  Last data filed at 1/12/2020 0201  Gross per 24 hour   Intake 100 ml   Output 600 ml   Net -500 ml       Physical Exam   Constitutional: He is oriented to person, place, and time. He appears well-developed.   Neck: No JVD present.   Cardiovascular: Normal rate.   Murmur (VAD) heard.  Pulmonary/Chest: Effort normal and breath sounds normal.   Abdominal: Soft. Bowel sounds are normal.   Musculoskeletal: Normal range of motion. He exhibits no edema.   Neurological: He is alert and oriented to person, place, and time. No cranial nerve deficit.   Skin: Skin is warm and dry. Capillary refill takes less than 2 seconds.     Significant Labs:  CBC:  Recent Labs   Lab 01/11/20 2135 01/12/20 0311   WBC 7.71 9.59   RBC 5.48 4.87   HGB 14.0 12.7*   HCT 46.8 42.1    228   MCV 85 86   MCH 25.5* 26.1*   MCHC 29.9* 30.2*     BNP:  Recent Labs   Lab 01/11/20 2135   *     CMP:  Recent Labs   Lab 01/11/20 2135 01/12/20  0311   * 125*   CALCIUM 9.9 9.4   ALBUMIN 3.9  --    PROT 7.7  --     138   K 3.4* 3.3*   CO2 24 27   CL 97 99   BUN 24* 25*   CREATININE 1.9* 1.9*   ALKPHOS 130  --    ALT 40  --    AST 25  --    BILITOT 0.4  --       Coagulation:   Recent Labs   Lab 01/11/20 2135 01/12/20  0230 01/12/20 0311   INR 5.3* 5.1* 5.0*   APTT 59.0*  --  43.4*     LDH:  Recent Labs   Lab 01/12/20 0311   *     Microbiology:  Microbiology Results (last 7 days)     ** No results found for the last 168 hours. **        I have reviewed all pertinent labs within the past 24  hours.    Diagnostic Results:  I have reviewed and interpreted all pertinent imaging results/findings within the past 24 hours.

## 2020-01-12 NOTE — ASSESSMENT & PLAN NOTE
- underwent cardioversion in ER  - likely exacerbated due to acute decompensated heart failure; will treat underlying etiology  - continue IV amiodarone gtt till load completes, then PO amio 400mg x 2 weeks followed by 400mg daily  - EP consulted for further adjustment of ICD settings and battery replacement  - routine labs; goal K > 4, Mg > 2

## 2020-01-12 NOTE — HPI
55 yo male with pmh sig for DCM,(EF 10%, grade III DD) s/p HM II with Outflow graft changed (03/9/18) as DT, BiV-Icd Medtronic (2014), HtN, obesity transferred from  for VT now in VF, shocked by device x 7.  Has been taking prednisone for hyperthyroidism which has caused him to eat/retain fluid and gain 13lbs as of late.  On exam he is decompensated, k 3.4,  higher than prior, creat 1.9 around baseline, INR 5.3.     He was admitted to the hospital in 10/2019 with similar situation 2/2 ICD shocks. MMVT -- multiple rounds of ATP--degenerated the MMVT into VF and the he was unsuccessfully shocked multiple times . He was eventually shocked externally to convert into sinus rhythm .  It was postulated reasons for ineffective therapy (had worsening hypervolemia week prior, length of time in the arrhythmia prior to ICD shock, IV amiodarone causing higher defibrillatory tissue threshold, or change in heart-shock vector post LVAD implant). He had a NIPS which showed successful DFT at 35J. He was discharge after being diuresed. Device check noted on 12/14/19 - one episode of MMVT 270 ms that was appropriately successfully shocked with multiple episodes of non sustained slower VT. Of note he had a monitoring zone added during his admission at 133. VT 1 zone at 167 -- 6 rounds of ATP added before shock in that zone.      TTE demonstrated EF >10%, AV does not open, IVF dilated 2.5cm with respiratory variation >50%, Dilated RV / LV.

## 2020-01-12 NOTE — HPI
Tim Richards is a 54 y.o. M with DCM,(EF 10%, grade III DD) s/p HM II with Outflow graft changed (03/9/18) as DT, BiV-Icd Medtronic (2014), HtN, obesity transferred from  for VT now in VF, shocked by device x 7.  Has been taking prednisone for hyperthyroidism which has caused him to eat/retain fluid and gain 13lbs as of late.  On exam he is decompensated, k 3.4,  higher than prior, creat 1.9 around baseline, INR 5.3.     He was admitted to the hospital in 10/2019 with similar situation 2/2 ICD shocks. MMVT -- multiple rounds of ATP--degenerated the MMVT into VF and the he was unsuccessfully shocked multiple times . He was eventually shocked externally to convert into sinus rhythm .  It was postulated reasons for ineffective therapy (had worsening hypervolemia week prior, length of time in the arrhythmia prior to ICD shock, IV amiodarone causing higher defibrillatory tissue threshold, or change in heart-shock vector post LVAD implant). He had a NIPS which showed successful DFT at 35J. He was discharge after being diuresed. Device check noted on 12/14/19 - one episode of MMVT 270 ms that was appropriately successfully shocked with multiple episodes of non sustained slower VT. Of note he had a monitoring zone added during his admission at 133. VT 1 zone at 167 -- 6 rounds of ATP added before shock in that zone.      TTE demonstrated EF >10%, AV does not open, IVF dilated 2.5cm with respiratory variation >50%, Dilated RV / LV.

## 2020-01-12 NOTE — ASSESSMENT & PLAN NOTE
Ventricular fibrillation/[place]  - underwent sedation + cardioversion in ER  - likely exacerbated due to acute decompensated heart failure; will treat underlying etiology  - continue IV amiodarone gtt till load completes, then PO amio 400mg x 2 weeks followed by 400mg daily  - bedside interrogation performed. See report above.   - EP consulted for further adjustment of ICD settings and battery replacement  - routine labs; goal K > 4, Mg > 2

## 2020-01-12 NOTE — CONSULTS
[facility]  Cardiac Electrophysiology  Consult Note    Admission Date: 1/11/2020  Code Status: Full Code   Attending Provider: Bettye Connors MD  Consulting Provider: Caroline Vasquez MD  Principal Problem:Torsades de pointes    Inpatient consult to Electrophysiology  Consult performed by: Caroline Vasquez MD  Consult ordered by: Caroline Vasquez MD        Subjective:     Chief Complaint:  ICD shocks     HPI:   Tim Richards is a 54 y.o. M with DCM,(EF 10%, grade III DD) s/p HM II with Outflow graft changed (03/9/18) as DT, BiV-Icd Medtronic (2014), HtN, obesity transferred from  for VT now in VF, shocked by device x 7 times. He reports he was sitting on the couch when the first shock occurred without warning followed by subsequent shocks every few minutes. Pt reports feeling palpitations before the shock but denies any chest pain or shortness of breath preceding the event. He does report 6-pillow orthopnea but denies any PND. He has chronic SOBOE.      Recently, he has been taking prednisone for hyperthyroidism which has caused him to eat and drink more than usual. As a result, he was gained ~13lbs.      He was noted to be in VT at  ER, started on amiodarone gtt and transferred here.  On arrival, he did have chest pain, SOB, dizziness and was diaphoretic. His rhythm was suggestive of VF/tDP for which he received conscious sedation and external cardioversion with 200J and given IV Mg. He had an elevated BNP (522), K 3.4, Mg 1.8, INR 5.3. He was sedated with propofol and externally cardioverted with 200J into NSR. He is being admitted for further monitoring. Of note, TTE demonstrated EF >10%, AV does not open, IVF dilated 2.5cm with respiratory variation >50%, Dilated RV / LV.    He has a prior history of VT in 10/2019 at which time he had MMVT underwent several rounds of ATP and degenerated into VF for which he was externally cardioverted into sinus rhythm. It was postulated reasons for  ineffective therapy (had worsening hypervolemia week prior, length of time in the arrhythmia prior to ICD shock, IV amiodarone causing higher defibrillatory tissue threshold, or change in heart-shock vector post LVAD implant). He had a NIPS which showed successful DFT at 35J. He was discharge after being diuresed. Device check noted on 12/14/19 - one episode of MMVT 270 ms that was appropriately successfully shocked with multiple episodes of non sustained slower VT. Of note he had a monitoring zone added during his admission at 133. VT 1 zone at 167 -- 6 rounds of ATP added before shock in that zone.       Past Medical History:   Diagnosis Date    CHF (congestive heart failure)     Dilated cardiomyopathy 1/10/2018    Disorder of kidney and ureter     CKD    Gout     HTN (hypertension)     Ventricular tachycardia (paroxysmal)        Past Surgical History:   Procedure Laterality Date    CARDIAC CATHETERIZATION  Dec. 2012    CARDIAC DEFIBRILLATOR PLACEMENT Left     CRRT-D    COLONOSCOPY N/A 3/6/2018    Procedure: COLONOSCOPY;  Surgeon: Alonso Bone MD;  [facility]  Service: Endoscopy;  Laterality: N/A;    COLONOSCOPY N/A 7/17/2019    Procedure: COLONOSCOPY;  Surgeon: Blane Valdez MD;  Location: [facility] (2ND FLR);  Service: Endoscopy;  Laterality: N/A;    COLONOSCOPY N/A 7/18/2019    Procedure: COLONOSCOPY;  Surgeon: Blane Valdez MD;  Location: [facility] (2ND FLR);  Service: Endoscopy;  Laterality: N/A;    ESOPHAGOGASTRODUODENOSCOPY N/A 7/17/2019    Procedure: EGD (ESOPHAGOGASTRODUODENOSCOPY);  Surgeon: Blane Valdez MD;  [facility]  Service: Endoscopy;  Laterality: N/A;    ESOPHAGOGASTRODUODENOSCOPY N/A 7/18/2019    Procedure: EGD (ESOPHAGOGASTRODUODENOSCOPY);  Surgeon: Blane Valdez MD;  [facility]  Service: Endoscopy;  Laterality: N/A;    NONINVASIVE CARDIAC ELECTROPHYSIOLOGY STUDY N/A 10/18/2019    Procedure: CARDIAC ELECTROPHYSIOLOGY STUDY,  NONINVASIVE;  Surgeon: Raz Wagner MD;  Location: [facility];  Service: Cardiology;  Laterality: N/A;  VT, DFTs, MDT CRTD in situ, LVAD, LASHELL pizarro, 309    TREATMENT OF CARDIAC ARRHYTHMIA  10/18/2019    Procedure: Cardioversion or Defibrillation;  Surgeon: Raz Wagner MD;  Location: [facility] EP LAB;  Service: Cardiology;;     Review of patient's allergies indicates:   Allergen Reactions    Lisinopril Anaphylaxis     No current facility-administered medications on file prior to encounter.      Current Outpatient Medications on File Prior to Encounter   Medication Sig    allopurinol (ZYLOPRIM) 100 MG tablet TAKE 1 TABLET BY MOUTH EVERY DAY    amiodarone (PACERONE) 400 MG tablet Take 1 tablet (400 mg total) by mouth once daily.    amLODIPine (NORVASC) 10 MG tablet Take 1 tablet (10 mg total) by mouth once daily.    doxazosin (CARDURA) 8 MG Tab Take 1 tablet (8 mg total) by mouth every 12 (twelve) hours.    ferrous gluconate (FERGON) 324 MG tablet Take 1 tablet (324 mg total) by mouth daily with breakfast.    magnesium oxide (MAG-OX) 400 mg (241.3 mg magnesium) tablet TAKE 1 TABLET (400 MG TOTAL) BY MOUTH 3 (THREE) TIMES DAILY.    methIMAzole (TAPAZOLE) 10 MG Tab TAKE 2 TABLETS BY MOUTH EVERY MORNING AND TAKE 1 TABLET EVERY EVENING    pantoprazole (PROTONIX) 40 MG tablet TAKE 1 TABLET (40 MG TOTAL) BY MOUTH ONCE DAILY.    potassium chloride (K-TAB) 20 mEq Take 20 mEq (1 tab) twice daily on M-W-F. Take 20 mEq (1 tab) once daily on Tu-Th-Sat-Geller    predniSONE (DELTASONE) 20 MG tablet Take 3 tablets (60 mg total) by mouth once daily.    spironolactone (ALDACTONE) 25 MG tablet Take 1 tablet (25 mg total) by mouth once daily.    warfarin (COUMADIN) 5 MG tablet Take 1.5 tabs by mouth on 7/23 only, followed by weekly dose of 1 tablet daily, except 1.5 tabs on Mon, Wed, Fri    mesalamine (PENTASA) 250 mg CpSR Take 4 capsules (1,000 mg total) by mouth 4 (four) times daily.    sildenafil (VIAGRA) 100 MG  tablet Take 1 tablet (100 mg total) by mouth daily as needed for Erectile Dysfunction.    torsemide (DEMADEX) 20 MG Tab Take 40 mg (2 tabs) twice a day . Take 40 mg (2 tabs) once a day [id number]     Family History     Problem Relation (Age of Onset)    Cancer Sister (54)    Coronary artery disease Father    Diabetes Father    Heart disease Father    Hypertension Father    No Known Problems Mother, Brother        Tobacco Use    Smoking status: Former Smoker     Packs/day: 1.     Years: 31.00     Pack years: 31.00     Types: Cigarettes     Last attempt to quit: 2018     Years since quittin.0    Smokeless tobacco: Never Used    Tobacco comment: pt is quiting on his own - pt stated not qualified for program;  pt  quit on his own   Substance and Sexual Activity    Alcohol use: No     Alcohol/week: 0.0 standard drinks     Comment: one fifth of gin or rum/week    Drug use: No    Sexual activity: Yes     Partners: Female     Birth control/protection: None     Comment: 10/5/17  with same partner 7 years      Review of Systems   Constitution: Positive for diaphoresis, malaise/fatigue and weight gain. Negative for chills and fever.   Cardiovascular: Positive for orthopnea and paroxysmal nocturnal dyspnea. Negative for chest pain, dyspnea on exertion, irregular heartbeat, near-syncope and syncope.   Respiratory: Negative for shortness of breath.    Gastrointestinal: Negative for nausea.   Neurological: Negative for headaches and weakness.     Objective:     Vital Signs (Most Recent):  Temp: 97.7 °F (36.5 °C) (20 0700)  Pulse: 71 (20 08)  Resp: (!) 26 (20)  BP: (!) 90/0 (20 0825)  SpO2: 100 % (20) Vital Signs (24h Range):  Temp:  [97.7 °F (36.5 °C)-98.2 °F (36.8 °C)] 97.7 °F (36.5 °C)  Pulse:  [] 71  Resp:  [18-33] 26  SpO2:  [94 %-100 %] 100 %  BP: ()/(0-88) 90/0     Weight: 121.5 kg (267 lb 13.7 oz)  Body mass index is 35.34  kg/m².    SpO2: 100 %  O2 Device (Oxygen Therapy): room air    Physical Exam   Constitutional: He is oriented to person, place, and time. He appears well-developed and well-nourished.   HENT:   Head: Normocephalic and atraumatic.   Eyes: Pupils are equal, round, and reactive to light. Conjunctivae and EOM are normal.   Neck: Normal range of motion. Neck supple. No JVD present.   Cardiovascular: Normal rate and regular rhythm. Exam reveals no gallop and no friction rub.   No murmur heard.  VAD hum audible   Pulmonary/Chest: Effort normal and breath sounds normal. No respiratory distress. He has no wheezes. He has no rales. He exhibits no tenderness.   Abdominal: Soft. Bowel sounds are normal. He exhibits no distension. There is no tenderness.   Musculoskeletal: Normal range of motion. He exhibits no edema or tenderness.   Neurological: He is alert and oriented to person, place, and time.   Skin: Skin is warm and dry. No erythema. No pallor.   Psychiatric: His behavior is normal. Judgment and thought content normal.   Vitals reviewed.      Significant Labs:   CMP:   Recent Labs   Lab 01/11/20 2135 01/12/20 0311    138   K 3.4* 3.3*   CL 97 99   CO2 24 27   * 125*   BUN 24* 25*   CREATININE 1.9* 1.9*   CALCIUM 9.9 9.4   PROT 7.7  --    ALBUMIN 3.9  --    BILITOT 0.4  --    ALKPHOS 130  --    AST 25  --    ALT 40  --    ANIONGAP 17* 12   ESTGFRAFRICA 46* 45.5*   EGFRNONAA 39* 39.4*    and CBC:   Recent Labs   Lab 01/11/20 2135 01/12/20  0311   WBC 7.71 9.59   HGB 14.0 12.7*   HCT 46.8 42.1    228     Significant Imaging: All imaging in the last 24 hours reviewed    Device Interrogation:   Medtronic CRT-D Implanted 10/31/14 by Dr. Nader Chiu     Remaining longevity: 5 months.   Mode DDD, mode switch at 171 bpm.   Lower rate: 50 bpm, Upper rate 120 bpm.   Paced/sensed AV: 300/300 ms     Detection         Monitor            Rates               Therapies  AT/AF              Monitor                         > 171 bpm       All Rx Off  VF                    On                   > 231 bpm       35J x 6  FVT                 OFF                                         Detection is OFF, 35J x 6  VT                    On                   161-231 bpm   Burst (3), Burst (3), Burst (3), Ramp (3), Ramp (3), Ramp (3)     Episode 323: on 1/11/20 at 21:09   VT at  -310 ms  Duration: 2.5 hrs  Therapies: Burst 1 Seq 1 - 3 -> Defib 36.1J ->  Defib 36.1J ->  Defib 36.1J ->  Defib 36.1J ->  Defib 36.1J ->  Defib 36.1J -> EGM Recording ended.     Assessment and Plan:     Ventricular tachycardia  Ventricular fibrillation/Torsades de Pointes  - underwent sedation + cardioversion in ER  - likely exacerbated due to acute decompensated heart failure; will treat underlying etiology per primary team  - continue IV amiodarone gtt till load completes, then PO amio 400mg x 2 weeks followed by 400mg daily  - bedside interrogation performed. See report above.   - will adjust ICD settings and consider battery replacement outpatient  - routine labs; goal K > 4, Mg > 2    Thank you for your consult. I will follow-up with patient. Please contact us if you have any additional questions.    Caroline Vasquez MD  Cardiac Electrophysiology  [facility]

## 2020-01-12 NOTE — ASSESSMENT & PLAN NOTE
Procedure: Device Interrogation Including analysis of device parameters  Current Settings: Ventricular Assist Device  Review of device function is stable/unstable stable    TXP LVAD INTERROGATIONS 1/12/2020 1/12/2020 1/11/2020 12/5/2019 10/25/2019 10/18/2019 10/18/2019   Type HeartMate II - HeartMate II - HeartMate II HeartMate II HeartMate3   Flow 5.0 5.5 - - 6.4 5.1 5.1   Speed 63612 38138 [id number] - 32539 64286 93131   PI 3.5 2.8 1.3 - 4.1 3.9 4.1   Power (Jackson) 6.5 6.7 5.7 - 7.2 6.5 6.6   LSL 9600 - - - - - -   Pulsatility - - - No Pulse - - -

## 2020-01-12 NOTE — PROGRESS NOTES
01/12/2020  Ayesha Haas    Current provider:  Bettye Connors MD      I, Ayesha Haas, rounded on Tim Richards to ensure all mechanical assist device settings (IABP or VAD) were appropriate and all parameters were within limits.  I was able to ensure all back up equipment was present, the staff had no issues, and the Perfusion Department daily rounding was complete.    12:44 PM

## 2020-01-12 NOTE — HPI
Tim Richards is a 54 y.o. M with DCM,(EF 10%, grade III DD) s/p HM II with Outflow graft changed (03/9/18) as DT, BiV-Icd Medtronic (2014), HtN, obesity transferred from  for VT now in VF, shocked by device x 7 times. He reports he was sitting on the couch when the first shock occurred without warning followed by subsequent shocks every few minutes. Pt reports feeling palpitations before the shock but denies any chest pain or shortness of breath preceding the event. He does report 6-pillow orthopnea but denies any PND. He has chronic SOBOE.      Recently, he has been taking prednisone for hyperthyroidism which has caused him to eat and drink more than usual. As a result, he was gained ~13lbs.      He was noted to be in VT at  ER, started on amiodarone gtt and transferred here.  On arrival, he did have chest pain, SOB, dizziness and was diaphoretic. His rhythm was suggestive of VF for which he received conscious sedation and external cardioversion with 200J and given IV Mg. He had an elevated BNP (522), K 3.4, Mg 1.8, INR 5.3. He was sedated with propofol and externally cardioverted with 200J into NSR. He is being admitted for further monitoring. Of note, TTE demonstrated EF >10%, AV does not open, IVF dilated 2.5cm with respiratory variation >50%, Dilated RV / LV.     He has prior history of VT in 10/2019 at which time he had MMVT underwent several rounds of ATP and degenerated into VF for which he was externally cardioverted into sinus rhythm. It was postulated reasons for ineffective therapy (had worsening hypervolemia week prior, length of time in the arrhythmia prior to ICD shock, IV amiodarone causing higher defibrillatory tissue threshold, or change in heart-shock vector post LVAD implant). He had a NIPS which showed successful DFT at 35J. He was discharge after being diuresed. Device check noted on 12/14/19 - one episode of MMVT 270 ms that was appropriately successfully shocked with multiple  episodes of non sustained slower VT. Of note he had a monitoring zone added during his admission at 133. VT 1 zone at 167 -- 6 rounds of ATP added before shock in that zone.     Since cardioversion, patient reports feeling fatigued but denies shortness of breath, chest pain, palpitations, diaphoresis. Repeat EKG with NSR, with wide QRS complex and IVCD,

## 2020-01-12 NOTE — ED PROVIDER NOTES
"Source of History:  Patient, EMR    Chief [facility]- [facility] ([facility]ICD fired 7 times. [facility] in "Stable VTach". Patient arrives on Amiodarone drip. Patient reports shortness of breath. ) and Pacemaker Problem (Pt's spouse reports pt's pacemaker being shocked 7x today. Last time 5 minutes ago. Pt had cp PTA. +SOB, dizziness)    HPI:  Tim Richards is a 54 y.o. male with history of congenital cardiomyopathy, with LVAD and AICD, presenting to emergency department as transfer from outside hospital for AICD firing.    Patient's AICD fired yesterday.  Also fired again this morning.  Later, patient ate dinner around 9:00 p.m..  Shortly after dinner, the AICD fired 7 times in quick succession.  He was taken to outside hospital where he was thought to be in V-tach.  He was mentating at that time, so was started on amiodarone.  He was transferred here for cardiology evaluation and arrived on amiodarone drip.    On arrival, patient is diaphoretic, pale, stating that he feels lightheaded and has soreness in his left chest.     Last oral intake was 9:00 p.m..  He did eat food.  He has no history of anesthesia reactions.  Does not wear dentures.  He has required sedation and cardioversion in the past for arrhythmia.    The VAD has not been alarming today.  Urine is yellow.  No fevers.  No cough.  No antecedent illness.  No bleeding symptoms.    ROS: As per HPI and below:  Review of Systems   Constitutional: Positive for diaphoresis. Negative for fever.   HENT: Negative for sore throat.    Eyes: Negative for double vision.   Respiratory: Negative for cough and shortness of breath.    Cardiovascular: Positive for chest pain.   Gastrointestinal: Negative for abdominal pain and vomiting.   Genitourinary: Negative for dysuria.   Musculoskeletal: Negative for falls.   Skin: Negative for rash.   Neurological: Positive for dizziness. Negative " for headaches.     Review of patient's allergies indicates:   Allergen Reactions    Lisinopril Anaphylaxis       No current facility-administered medications on file prior to encounter.      Current Outpatient Medications on File Prior to Encounter   Medication Sig Dispense Refill    allopurinol (ZYLOPRIM) 100 MG tablet TAKE 1 TABLET BY MOUTH EVERY DAY 30 tablet 5    amiodarone (PACERONE) 400 MG tablet Take 1 tablet (400 mg total) by mouth once daily. 90 tablet 3    amLODIPine (NORVASC) 10 MG tablet Take 1 tablet (10 mg total) by mouth once daily. 30 tablet 11    doxazosin (CARDURA) 8 MG Tab Take 1 tablet (8 mg total) by mouth every 12 (twelve) hours. 60 tablet 11    ferrous gluconate (FERGON) 324 MG tablet Take 1 tablet (324 mg total) by mouth daily with breakfast. 90 tablet 6    magnesium oxide (MAG-OX) 400 mg (241.3 mg magnesium) tablet TAKE 1 TABLET (400 MG TOTAL) BY MOUTH 3 (THREE) TIMES DAILY. 90 tablet 6    methIMAzole (TAPAZOLE) 10 MG Tab TAKE 2 TABLETS BY MOUTH EVERY MORNING AND TAKE 1 TABLET EVERY EVENING 90 tablet 1    pantoprazole (PROTONIX) 40 MG tablet TAKE 1 TABLET (40 MG TOTAL) BY MOUTH ONCE DAILY. 30 tablet 11    potassium chloride (K-TAB) 20 mEq Take 20 mEq (1 tab) twice daily on M-W-F. Take 20 mEq (1 tab) once daily on Tu-Th-Sat-Geller 80 tablet 11    predniSONE (DELTASONE) 20 MG tablet Take 3 tablets (60 mg total) by mouth once daily. 60 tablet 2    spironolactone (ALDACTONE) 25 MG tablet Take 1 tablet (25 mg total) by mouth once daily. 30 tablet 11    warfarin (COUMADIN) 5 MG tablet Take 1.5 tabs by mouth on 7/23 only, followed by weekly dose of 1 tablet daily, except 1.5 tabs on Mon, Wed, Fri 45 tablet 11    mesalamine (PENTASA) 250 mg CpSR Take 4 capsules (1,000 mg total) by mouth 4 (four) times daily. 480 capsule 2    sildenafil (VIAGRA) 100 MG tablet Take 1 tablet (100 mg total) by mouth daily as needed for Erectile Dysfunction. 10 tablet 1    torsemide (DEMADEX) 20 MG Tab Take  40 mg (2 tabs) twice a day on M-W-F. Take 40 mg (2 tabs) once a day Su-Tu-Th-Sat 80 tablet 11       PMH:  As per HPI and below:  Past Medical History:   Diagnosis Date    CHF (congestive heart failure)     Dilated cardiomyopathy 1/10/2018    Disorder of kidney and ureter     CKD    Gout     HTN (hypertension)     Ventricular tachycardia (paroxysmal)      Past Surgical History:   Procedure Laterality Date    CARDIAC CATHETERIZATION  Dec. 2012    CARDIAC DEFIBRILLATOR PLACEMENT Left     CRRT-D    COLONOSCOPY N/A 3/6/2018    Procedure: COLONOSCOPY;  Surgeon: Alonso Bone MD;  Location: [facility] ENDO (2ND FLR);  Service: Endoscopy;  Laterality: N/A;    COLONOSCOPY N/A 7/17/2019    Procedure: COLONOSCOPY;  Surgeon: Blane Valdez MD;  Location: [facility] ENDO (2ND FLR);  Service: Endoscopy;  Laterality: N/A;    COLONOSCOPY N/A 7/18/2019    Procedure: COLONOSCOPY;  Surgeon: Blane Valdez MD;  Location: [facility] ENDO (2ND FLR);  Service: Endoscopy;  Laterality: N/A;    ESOPHAGOGASTRODUODENOSCOPY N/A 7/17/2019    Procedure: EGD (ESOPHAGOGASTRODUODENOSCOPY);  Surgeon: Blane Valdez MD;  Location: [facility] (2ND FLR);  Service: Endoscopy;  Laterality: N/A;    ESOPHAGOGASTRODUODENOSCOPY N/A 7/18/2019    Procedure: EGD (ESOPHAGOGASTRODUODENOSCOPY);  Surgeon: Blane Valdez MD;  Location: [facility] (2ND FLR);  Service: Endoscopy;  Laterality: N/A;    NONINVASIVE CARDIAC ELECTROPHYSIOLOGY STUDY N/A 10/18/2019    Procedure: CARDIAC ELECTROPHYSIOLOGY STUDY, NONINVASIVE;  Surgeon: Raz Wagner MD;  Location: [facility] EP LAB;  Service: Cardiology;  Laterality: N/A;  VT, DFTs, MDT CRTD in situ, LVAD, LASHELL pizarro, [id number]    TREATMENT OF CARDIAC ARRHYTHMIA  10/18/2019    Procedure: Cardioversion or Defibrillation;  Surgeon: Raz Wagner MD;  Location: [facility] EP LAB;  Service: Cardiology;;       Social History     Socioeconomic History    Marital status:      Spouse name: Not on file    Number of children: Not on file    Years of  education: Not on file    Highest education level: Not on file   Occupational History     Employer: [employer]    Social Needs    Financial resource strain: Not on file    Food insecurity:     Worry: Not on file     Inability: Not on file    Transportation needs:     Medical: Not on file     Non-medical: Not on file   Tobacco Use    Smoking status: Former Smoker     Packs/day: 1.00     Years: 31.00     Pack years: 31.00     Types: Cigarettes     Last attempt to quit: 2018     Years since quittin.0    Smokeless tobacco: Never Used    Tobacco comment: pt is quiting on his own - pt stated not qualified for program;  pt  quit on his own   Substance and Sexual Activity    Alcohol use: No     Alcohol/week: 0.0 standard drinks     Comment: one fifth of gin or rum/week    Drug use: No    Sexual activity: Yes     Partners: Female     Birth control/protection: None     Comment: 10/5/17  with same partner 7 years    Lifestyle    Physical activity:     Days per week: Not on file     Minutes per session: Not on file    Stress: Not on file   Relationships    Social connections:     Talks on phone: Not on file     Gets together: Not on file     Attends Hoahaoism service: Not on file     Active member of club or organization: Not on file     Attends meetings of clubs or organizations: Not on file     Relationship status: Not on file   Other Topics Concern    Not on file   Social History Narrative    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  Mental Status:  Alert and oriented x 3.  Appropriate, conversant.  Skin:   Pale, cool, diaphoretic. No rashes seen.  ENT: Oropharynx clear.    Pulm: Clear to auscultation bilaterally.  Good air movement.  No wheezes. No increased work of breathing.  CV:  VAD hum.  Abd: Soft.  Not distended.  Nontender.  No guarding.  No rebound.  MSK: Good range of motion all joints.  No deformities.    Neck supple.  No meningismus.  Neuro: Awake. Speech normal. No focal neuro deficit observed.     Laboratory Studies:  Labs Reviewed   CBC W/ AUTO DIFFERENTIAL - Abnormal; Notable for the following components:       Result Value    Mean Corpuscular Hemoglobin 25.5 (*)     Mean Corpuscular Hemoglobin Conc 29.9 (*)     RDW 14.6 (*)     Gran% 76.0 (*)     All other components within normal limits   COMPREHENSIVE METABOLIC PANEL - Abnormal; Notable for the following components:    Potassium 3.4 (*)     Glucose 228 (*)     BUN, Bld 24 (*)     Creatinine 1.9 (*)     Anion Gap 17 (*)     eGFR if  46 (*)     eGFR if non  39 (*)     All other components within normal limits   PROTIME-INR - Abnormal; Notable for the following components:    Prothrombin Time 55.8 (*)     INR 5.3 (*)     All other components within normal limits    Narrative:      INR  critical result(s) called and verbal readback obtained from   Vidhi Oliva by CK 01/11/2020 22:25   TROPONIN I - Abnormal; Notable for the following components:    Troponin I 0.068 (*)     All other components within normal limits   TSH - Abnormal; Notable for the following components:    TSH <0.010 (*)     All other components within normal limits   B-TYPE NATRIURETIC PEPTIDE - Abnormal; Notable for the following components:     (*)     All other components within normal limits   APTT - Abnormal; Notable for the following components:    aPTT 59.0 (*)     All other components within normal limits   T4, FREE - Abnormal; Notable for the following components:    Free T4 3.91 (*)     All other components within normal limits    MAGNESIUM   TROPONIN I     EKG (independently interpreted by me):  Torsades de points    Chart reviewed.     Imaging Results    None       Medications Given:  Medications   potassium chloride 10 mEq in 100 mL IVPB (10 mEq Intravenous New Bag 1/11/20 2333)   magnesium sulfate 2 g/50 ml IVPB (2 g Intravenous Given 1/12/20 0005)   furosemide (LASIX) 2 mg/mL in sodium chloride 0.9% 100 mL infusion (conc: 2 mg/mL) (has no administration in time range)   potassium chloride 10 mEq in 100 mL IVPB (has no administration in time range)   potassium chloride 10 mEq in 100 mL IVPB (has no administration in time range)   potassium chloride 10 mEq in 100 mL IVPB (has no administration in time range)   amiodarone in dextrose 150 mg/100 mL (1.5 mg/mL) loading dose 150 mg (0 mg Intravenous Stopped 1/11/20 2159)   amiodarone 360 mg/200 mL (1.8 mg/mL) infusion (1 mg/min Intravenous New Bag 1/11/20 2157)   morphine injection 4 mg (4 mg Intravenous Given 1/11/20 2149)   ondansetron injection 4 mg (4 mg Intravenous Given 1/11/20 2149)   magnesium sulfate 2 g/50 ml IVPB (2 g Intravenous Given 1/11/20 2315)   fentaNYL injection 100 mcg (100 mcg Intravenous Given 1/11/20 2335)   propofol (DIPRIVAN) 10 mg/mL IVP (31 mg Intravenous Given by Other 1/11/20 2335)   furosemide injection 100 mg (100 mg Intravenous Given 1/12/20 0025)     Discussed with: cardiology    MDM:    54 y.o. male transferred from outside hospital for presumed persistent ventricular tachycardia.  On arrival he is tachycardic, with a dopplerable MAP of 84.  Unable to obtain an oxygen saturation, as I believe he is not perfusing his extremities well. He is mentating appropriately, but diaphoretic and complaining of chest pain.    Initial rhythm strip demonstrates torsades.  Magnesium was immediately ordered and Cardiology was consulted.  The amiodarone was continued.  Patient was evaluated for emergent cardioversion.    He did sign a consent form.  Cardioversion was  "performed at 200 joules.  Anesthesia was done using fentanyl and propofol.  Please see separate note.  There were no complications.    Immediately after cardioversion, patient was back in sinus rhythm.    Cardiology admitted patient to their [facility] ICU service.    Procedural Sedation  Date/Time: 2020 11:35 PM  Performed by: Cassy Beckett MD  Authorized by: Cassy Beckett MD   Consent Done: Yes  Consent: Verbal consent obtained. Written consent obtained.  Risks and benefits: risks, benefits and alternatives were discussed  Consent given by: patient  Patient understanding: patient states understanding of the procedure being performed  Patient consent: the patient's understanding of the procedure matches consent given  Relevant documents: relevant documents present and verified  Imaging studies: imaging studies available  Required items: required blood products, implants, devices, and special equipment available  Patient identity confirmed: , name and verbally with patient  Time out: Immediately prior to procedure a "time out" was called to verify the correct patient, procedure, equipment, support staff and site/side marked as required.  ASA Class: Class 3 - Systemic Illness with functional impairment.  Mallampati Score: Class 4 - Soft palate not visualized.   NPO STATUS:  Date/Time of last solid: 2020 9:00 PM  Equipment: on cardiac monitor., on BP monitor., on CO2 monitor., on supplemental oxygen., suction available. and airway equipment available.   Sedation type: moderate (conscious) sedation  (See MAR for exact dosages of medications).  Sedatives: propofol  Analgesia: fentanyl  Sedation start date/time: 2020 11:35 PM  Sedation end date/time: 2020 11:52 PM  Vitals: Vital signs were monitored during sedation.  Complications: No complications.     Cardioversion  Date/Time: 2020 11:37 PM  Performed by: Cassy Beckett MD  Authorized by: Cassy Beckett MD   Consent Done: " "Yes  Consent: Verbal consent obtained. Written consent obtained.  Risks and benefits: risks, benefits and alternatives were discussed  Consent given by: patient  Patient understanding: patient states understanding of the procedure being performed  Patient consent: the patient's understanding of the procedure matches consent given  Relevant documents: relevant documents present and verified  Imaging studies: imaging studies available  Required items: required blood products, implants, devices, and special equipment available  Patient identity confirmed:  and name  Time out: Immediately prior to procedure a "time out" was called to verify the correct patient, procedure, equipment, support staff and site/side marked as required.  Patient sedated: yes  Cardioversion basis: emergent  Pre-procedure rhythm: torsades de points  Patient position: patient was placed in a supine position  Chest area: chest area exposed  Electrodes: pads  Electrodes placed: anterior-posterior  Number of attempts: 1  Attempt 1 mode: synchronous  Attempt 1 shock (in Joules): 200  Attempt 1 outcome: conversion to normal sinus rhythm  Post-procedure rhythm: paced  Complications: no complications  Patient tolerance: Patient tolerated the procedure well with no immediate complications  Complications: No  Specimens: No  Implants: No        Critical Care Procedure Note    Authorized and Performed by: Cassy Beckett    Total critical care time: Approximately 35 minutes    Due to a high probability of clinically significant, life threatening deterioration, the patient required my highest level of preparedness to intervene emergently and I personally spent this critical care time directly and personally managing the patient. This critical care time included obtaining a history; examining the patient; pulse oximetry; ordering and review of studies; arranging urgent treatment with development of a management plan; evaluation of patient's response to " treatment; frequent reassessment; and, discussions with other providers.    This critical care time was performed to assess and manage the high probability of imminent, life-threatening deterioration that could result in multi-organ failure. It was exclusive of separately billable procedures and treating other patients and teaching time.    Diagnostic Impression:    1. Torsades de pointes    2. Chest pain    3. Ventricular tachycardia    4. LVAD (left ventricular assist device) present    5. Arrhythmia        Patient and/or family understands the plan and is in agreement, verbalized understanding, questions answered    Cassy Beckett MD  Emergency Medicine           Cassy Beckett MD  01/12/20 0144

## 2020-01-12 NOTE — SUBJECTIVE & OBJECTIVE
Past Medical History:   Diagnosis Date    CHF (congestive heart failure)     Dilated cardiomyopathy 1/10/2018    Disorder of kidney and ureter     CKD    Gout     HTN (hypertension)     Ventricular tachycardia (paroxysmal)        Past Surgical History:   Procedure Laterality Date    CARDIAC CATHETERIZATION  Dec. 2012    CARDIAC DEFIBRILLATOR PLACEMENT Left     CRRT-D    COLONOSCOPY N/A 3/6/2018    Procedure: COLONOSCOPY;  Surgeon: Alonso Bone MD;  Location: [facility] ENDO (2ND FLR);  Service: Endoscopy;  Laterality: N/A;    COLONOSCOPY N/A 7/17/2019    Procedure: COLONOSCOPY;  Surgeon: Blane Valdez MD;  Location: [facility] ENDO (2ND FLR);  Service: Endoscopy;  Laterality: N/A;    COLONOSCOPY N/A 7/18/2019    Procedure: COLONOSCOPY;  Surgeon: Blane Valdez MD;  Location: [facility] ENDO (2ND FLR);  Service: Endoscopy;  Laterality: N/A;    ESOPHAGOGASTRODUODENOSCOPY N/A 7/17/2019    Procedure: EGD (ESOPHAGOGASTRODUODENOSCOPY);  Surgeon: Blane Valdez MD;  Location: [facility] (2ND FLR);  Service: Endoscopy;  Laterality: N/A;    ESOPHAGOGASTRODUODENOSCOPY N/A 7/18/2019    Procedure: EGD (ESOPHAGOGASTRODUODENOSCOPY);  Surgeon: Blane Valdez MD;  Location: [facility] (2ND FLR);  Service: Endoscopy;  Laterality: N/A;    NONINVASIVE CARDIAC ELECTROPHYSIOLOGY STUDY N/A 10/18/2019    Procedure: CARDIAC ELECTROPHYSIOLOGY STUDY, NONINVASIVE;  Surgeon: Raz Wagner MD;  Location: [facility] EP LAB;  Service: Cardiology;  Laterality: N/A;  VT, DFTs, MDT CRTD in situ, LVAD, LASHELL pizarro, 3098    TREATMENT OF CARDIAC ARRHYTHMIA  10/18/2019    Procedure: Cardioversion or Defibrillation;  Surgeon: Raz Wagner MD;  Location: [facility] EP LAB;  Service: Cardiology;;       Review of patient's allergies indicates:   Allergen Reactions    Lisinopril Anaphylaxis       No current facility-administered medications on file prior to encounter.      Current Outpatient Medications on File Prior to Encounter   Medication Sig    allopurinol (ZYLOPRIM) 100  MG tablet TAKE 1 TABLET BY MOUTH EVERY DAY    amiodarone (PACERONE) 400 MG tablet Take 1 tablet (400 mg total) by mouth once daily.    amLODIPine (NORVASC) 10 MG tablet Take 1 tablet (10 mg total) by mouth once daily.    doxazosin (CARDURA) 8 MG Tab Take 1 tablet (8 mg total) by mouth every 12 (twelve) hours.    ferrous gluconate (FERGON) 324 MG tablet Take 1 tablet (324 mg total) by mouth daily with breakfast.    magnesium oxide (MAG-OX) 400 mg (241.3 mg magnesium) tablet TAKE 1 TABLET (400 MG TOTAL) BY MOUTH 3 (THREE) TIMES DAILY.    methIMAzole (TAPAZOLE) 10 MG Tab TAKE 2 TABLETS BY MOUTH EVERY MORNING AND TAKE 1 TABLET EVERY EVENING    pantoprazole (PROTONIX) 40 MG tablet TAKE 1 TABLET (40 MG TOTAL) BY MOUTH ONCE DAILY.    potassium chloride (K-TAB) 20 mEq Take 20 mEq (1 tab) twice daily on M-W-F. Take 20 mEq (1 tab) once daily on Tu-Th-Sat-Geller    predniSONE (DELTASONE) 20 MG tablet Take 3 tablets (60 mg total) by mouth once daily.    spironolactone (ALDACTONE) 25 MG tablet Take 1 tablet (25 mg total) by mouth once daily.    warfarin (COUMADIN) 5 MG tablet Take 1.5 tabs by mouth on 7/23 only, followed by weekly dose of 1 tablet daily, except 1.5 tabs on Mon, Wed, Fri    mesalamine (PENTASA) 250 mg CpSR Take 4 capsules (1,000 mg total) by mouth 4 (four) times daily.    sildenafil (VIAGRA) 100 MG tablet Take 1 tablet (100 mg total) by mouth daily as needed for Erectile Dysfunction.    torsemide (DEMADEX) 20 MG Tab Take 40 mg (2 tabs) twice a day on M-W-F. Take 40 mg (2 tabs) once a day Qi-Gh-Hr-Sat     Family History     Problem Relation (Age of Onset)    Cancer Sister (54)    Coronary artery disease Father    Diabetes Father    Heart disease Father    Hypertension Father    No Known Problems Mother, Brother        Tobacco Use    Smoking status: Former Smoker     Packs/day: 1.00     Years: 31.00     Pack years: 31.00     Types: Cigarettes     Last attempt to quit: 1/12/2018     Years since quitting:  2.0    Smokeless tobacco: Never Used    Tobacco comment: pt is quiting on his own - pt stated not qualified for program; 2/16 pt  quit on his own   Substance and Sexual Activity    Alcohol use: No     Alcohol/week: 0.0 standard drinks     Comment: one fifth of gin or rum/week    Drug use: No    Sexual activity: Yes     Partners: Female     Birth control/protection: None     Comment: 10/5/17  with same partner 7 years      Review of Systems   All other systems reviewed and are negative.    Objective:     Vital Signs (Most Recent):  Temp: 97.7 °F (36.5 °C) (01/12/20 0700)  Pulse: 71 (01/12/20 0833)  Resp: (!) 26 (01/12/20 0833)  BP: (!) 90/0(doppler) (01/12/20 0900)  SpO2: 100 % (01/12/20 0833) Vital Signs (24h Range):  Temp:  [97.7 °F (36.5 °C)-98.2 °F (36.8 °C)] 97.7 °F (36.5 °C)  Pulse:  [] 71  Resp:  [18-33] 26  SpO2:  [94 %-100 %] 100 %  BP: ()/(0-88) 90/0       Weight: 121.5 kg (267 lb 13.7 oz)  Body mass index is 35.34 kg/m².    SpO2: 100 %  O2 Device (Oxygen Therapy): room air    Physical Exam   Constitutional: He is oriented to person, place, and time. He appears well-developed and well-nourished.   HENT:   Head: Atraumatic.   Eyes: Pupils are equal, round, and reactive to light.   Neck: Neck supple. No JVD present.   Cardiovascular:   Smooth LVAD hum   Pulmonary/Chest: Effort normal and breath sounds normal.   Abdominal: Soft. Bowel sounds are normal.   Musculoskeletal: Normal range of motion. He exhibits no edema.   Neurological: He is alert and oriented to person, place, and time.   Skin: Skin is warm and dry.   Psychiatric: He has a normal mood and affect.       Significant Labs:   EP:   Recent Labs   Lab 01/11/20  2135 01/12/20  0230 01/12/20  0311     --  138   K 3.4*  --  3.3*   CL 97  --  99   CO2 24  --  27   *  --  125*   BUN 24*  --  25*   CREATININE 1.9*  --  1.9*   CALCIUM 9.9  --  9.4   PROT 7.7  --   --    ALBUMIN 3.9  --   --    BILITOT 0.4  --   --     ALKPHOS 130  --   --    AST 25  --   --    ALT 40  --   --    ANIONGAP 17*  --  12   ESTGFRAFRICA 46*  --  45.5*   EGFRNONAA 39*  --  39.4*   WBC 7.71  --  9.59   HGB 14.0  --  12.7*   HCT 46.8  --  42.1     --  228   INR 5.3* 5.1* 5.0*       Significant Imaging: Echocardiogram:   2D echo with color flow doppler:   Results for orders placed or performed during the hospital encounter of 07/18/18   2D Echo w/ Color Flow Doppler   Result Value Ref Range    QEF 15 (A) 55 - 65    Mitral Valve Regurgitation SEVERE (A)     Est. PA Systolic Pressure 33.6     Mitral Valve Mobility NORMAL     Tricuspid Valve Regurgitation MILD     Narrative    Date of Procedure: 07/18/2018        TEST DESCRIPTION   Technical Quality: This is a technically challenging study. There is poor endocardial definition.     General: A catheter is present in the right-sided cardiac chambers.     Aorta: The aortic root is normal in size, measuring 2.1 cm at sinotubular junction and 3.4 cm at Sinuses of Valsalva. The proximal ascending aorta is normal in size, measuring 3.6 cm across.     Left Atrium: The left atrial volume index is severely enlarged, measuring 73.67 cc/m2.     Left Ventricle: The left ventricle is severely enlarged, with an end-diastolic diameter of 8.3 cm, and an end-systolic diameter of 7.7 cm. LV wall thickness is normal, with the septum and the posterior wall each measuring 0.8 cm across. Relative wall   thickness was normal at 0.19, and the LV mass index was increased at 172.0 g/m2 consistent with eccentric left ventricular hypertrophy. There is global hypokinesis. Left ventricular systolic function appears severely depressed. Visually estimated   ejection fraction is 15-20%.         Right Atrium: The right atrium is enlarged, measuring 6.8 cm in length and 5.1 cm in width in the apical view.     Right Ventricle: The right ventricle is normal in size measuring 3.4 cm at the base in the apical right ventricle-focused view.  Global right ventricular systolic function appears moderately depressed. There is abnormal septal motion. Tricuspid annular   plane systolic excursion (TAPSE) is 1.2 cm. The estimated PA systolic pressure is 34 mmHg.     Aortic Valve:  The aortic valve is normal in structure.     Mitral Valve:  The mitral valve is normal in structure with normal leaflet mobility. There is severe mitral regurgitation.     Tricuspid Valve:  The tricuspid valve is normal in structure with normal leaflet mobility. There is mild tricuspid regurgitation.     Pulmonary Valve:  The pulmonic valve is normal in structure with normal leaflet mobility. There is trivial to mild pulmonic regurgitation.     IVC: IVC is enlarged but collapses > 50% with a sniff, suggesting intermediate right atrial pressure of 8 mmHg.     Intracavitary: There is no evidence of pericardial effusion, intracavity mass, thrombi, or vegetation.     Other: There is a left pleural effusion present.       LVAD: There is a HeartMate II LVAD in place. Inflow cannula is visualized. Outflow graft is not visualized. Baseline speed is 9400 rpms. The aortic valve opens intermittently. Septum is midline.       CONCLUSIONS     1 - Severely depressed left ventricular systolic function (EF 15-20%).     2 - Eccentric hypertrophy.     3 - Biatrial enlargement.     4 - Moderately depressed right ventricular systolic function .     5 - The estimated PA systolic pressure is 34 mmHg.     6 - Severe mitral regurgitation.     7 - Mild tricuspid regurgitation.     8 - Trivial to mild pulmonic regurgitation.     9 - Intermediate central venous pressure.     10 - Left pleural effusion.     11 - HeartMate II LVAD; speed 9400.             This document has been electronically    SIGNED BY: Magi Delong MD On: 07/18/2018 17:10    and Transthoracic echo (TTE) complete (Cupid Only):   Results for orders placed or performed during the hospital encounter of 11/08/19   Echo Color Flow Doppler? Yes    Result Value Ref Range    Ascending aorta 2.83 cm    STJ 3.90 cm    IVS 1.00 0.6 - 1.1 cm    LA size 4.56 cm    Left Atrium Major Axis 7.08 cm    Left Atrium Minor Axis 6.79 cm    LVIDD 11.00 (A) 3.5 - 6.0 cm    LVIDS 9.31 (A) 2.1 - 4.0 cm    LVOT diameter 2.24 cm    PW 0.90 0.6 - 1.1 cm    MV Peak A Jorge 0.84 m/s    E wave decelartion time 124.12 msec    MV Peak E Jorge 0.94 m/s    RA Major Axis 5.36 cm    RA Width 4.62 cm    RVDD 3.61 cm    Sinus 3.37 cm    TAPSE 1.68 cm    TR Max Jorge 1.94 m/s    TDI LATERAL 0.07 m/s    TDI SEPTAL 0.10 m/s    LA WIDTH 5.03 cm    LV Diastolic Volume 538.72 mL    LV Systolic Volume 482.19 mL    LV LATERAL E/E' RATIO 13.43 m/s    LV SEPTAL E/E' RATIO 9.40 m/s    FS 15 %    LA volume 135.15 cm3    LV mass 679.25 g    Left Ventricle Relative Wall Thickness 0.16 cm    E/A ratio 1.12     Mean e' 0.09 m/s    LVOT area 3.9 cm2    E/E' ratio 11.06 m/s    LV Systolic Volume Index 199.8 mL/m2    LV Diastolic Volume Index 223.21 mL/m2    LA Volume Index 56.0 mL/m2    LV Mass Index 281 g/m2    Triscuspid Valve Regurgitation Peak Gradient 15 mmHg    BSA 2.47 m2    Right Atrial Pressure (from IVC) 3 mmHg    TV rest pulmonary artery pressure 18 mmHg    Narrative    · LVAD present. [place] 8[address]15 RPMs. The pump type is a Heartmate II.  · The interventricular septum appears midline. The aortic valve does not   open.  · Severely decreased left ventricular systolic function. The estimated   ejection fraction is 10%  · Severe left ventricular enlargement. LV end diastolic dimension 11 cm.  · Eccentric left ventricular hypertrophy.  · Grade I (mild) left ventricular diastolic dysfunction consistent with   impaired relaxation.  · Mildly to moderately reduced right ventricular systolic function.  · Severe left atrial enlargement.  · Moderate right atrial enlargement.  · Mild-to-moderate mitral regurgitation.  · Normal central venous pressure (3 mm Hg).  · The estimated PA systolic pressure is 18 mm  Hg

## 2020-01-12 NOTE — SUBJECTIVE & OBJECTIVE
Past Medical History:   Diagnosis Date    CHF (congestive heart failure)     Dilated cardiomyopathy 1/10/2018    Disorder of kidney and ureter     CKD    Gout     HTN (hypertension)     Ventricular tachycardia (paroxysmal)        Past Surgical History:   Procedure Laterality Date    CARDIAC CATHETERIZATION  Dec. 2012    CARDIAC DEFIBRILLATOR PLACEMENT Left     CRRT-D    COLONOSCOPY N/A 3/6/2018    Procedure: COLONOSCOPY;  Surgeon: Alonso Bone MD;  Location: [facility] ENDO (2ND FLR);  Service: Endoscopy;  Laterality: N/A;    COLONOSCOPY N/A 7/17/2019    Procedure: COLONOSCOPY;  Surgeon: Blane Valdez MD;  Location: [facility] ENDO (2ND FLR);  Service: Endoscopy;  Laterality: N/A;    COLONOSCOPY N/A 7/18/2019    Procedure: COLONOSCOPY;  Surgeon: Blane Valdez MD;  Location: [facility] ENDO (2ND FLR);  Service: Endoscopy;  Laterality: N/A;    ESOPHAGOGASTRODUODENOSCOPY N/A 7/17/2019    Procedure: EGD (ESOPHAGOGASTRODUODENOSCOPY);  Surgeon: Blane Valdez MD;  Location: [facility] (2ND FLR);  Service: Endoscopy;  Laterality: N/A;    ESOPHAGOGASTRODUODENOSCOPY N/A 7/18/2019    Procedure: EGD (ESOPHAGOGASTRODUODENOSCOPY);  Surgeon: Blane Valdez MD;  Location: [facility] (2ND FLR);  Service: Endoscopy;  Laterality: N/A;    NONINVASIVE CARDIAC ELECTROPHYSIOLOGY STUDY N/A 10/18/2019    Procedure: CARDIAC ELECTROPHYSIOLOGY STUDY, NONINVASIVE;  Surgeon: Raz Wagner MD;  Location: [facility] EP LAB;  Service: Cardiology;  Laterality: N/A;  VT, DFTs, MDT CRTD in situ, LVAD, LASHELL pizarro, 3098    TREATMENT OF CARDIAC ARRHYTHMIA  10/18/2019    Procedure: Cardioversion or Defibrillation;  Surgeon: Raz Wagner MD;  Location: [facility] EP LAB;  Service: Cardiology;;       Review of patient's allergies indicates:   Allergen Reactions    Lisinopril Anaphylaxis         (Not in a hospital admission)  Family History     Problem Relation (Age of Onset)    Cancer Sister (54)    Coronary artery disease Father    Diabetes Father    Heart disease Father     Hypertension Father    No Known Problems Mother, Brother        Tobacco Use    Smoking status: Former Smoker     Packs/day: 1.00     Years: 31.00     Pack years: 31.00     Types: Cigarettes     Last attempt to quit: 2018     Years since quittin.9    Smokeless tobacco: Never Used    Tobacco comment: pt is quiting on his own - pt stated not qualified for program;  pt  quit on his own   Substance and Sexual Activity    Alcohol use: No     Alcohol/week: 0.0 standard drinks     Comment: one fifth of gin or rum/week    Drug use: No    Sexual activity: Yes     Partners: Female     Birth control/protection: None     Comment: 10/5/17  with same partner 7 years      Review of Systems   Constitution: Positive for diaphoresis, malaise/fatigue and weight gain.   HENT: Negative.    Eyes: Negative.    Cardiovascular: Positive for orthopnea and paroxysmal nocturnal dyspnea.   Respiratory: Negative.    Endocrine: Negative.    Skin: Negative.    Musculoskeletal: Negative.    Gastrointestinal: Negative.    Genitourinary: Negative.    Neurological: Negative.      Objective:     Vital Signs (Most Recent):  Temp: 98.2 °F (36.8 °C) (20)  Pulse: (!) 130 (20)  Resp: 18 (20)  BP: 130/88(Automated Cuff per EMS ) (20)  SpO2: 98 % (20) Vital Signs (24h Range):  Temp:  [98 °F (36.7 °C)-98.2 °F (36.8 °C)] 98.2 °F (36.8 °C)  Pulse:  [130] 130  Resp:  [18-20] 18  SpO2:  [98 %] 98 %  BP: (130)/(88) 130/88     Weight: 124.7 kg (274 lb 14.6 oz)  Body mass index is 36.27 kg/m².    SpO2: 98 %  O2 Device (Oxygen Therapy): nasal cannula      Intake/Output Summary (Last 24 hours) at 2020 8101  Last data filed at 2020 4430  Gross per 24 hour   Intake 100 ml   Output --   Net 100 ml       Lines/Drains/Airways     Line                 VAD 18 1124 Left ventricular assist device HeartMate  days          Peripheral Intravenous Line                 Peripheral  IV - Single Lumen 01/11/20 2134 20 G Left Forearm less than 1 day         Peripheral IV - Single Lumen 01/11/20 2316 20 G Right Forearm less than 1 day                Physical Exam   Constitutional: He is oriented to person, place, and time. He appears well-developed and well-nourished.   HENT:   Head: Normocephalic and atraumatic.   Eyes: Pupils are equal, round, and reactive to light. Conjunctivae and EOM are normal.   Neck: Normal range of motion. Neck supple. JVD present.   Cardiovascular: Exam reveals no gallop and no friction rub.   No murmur heard.  VAD hum audible   Pulmonary/Chest: Effort normal and breath sounds normal. No respiratory distress. He has no wheezes. He has no rales. He exhibits no tenderness.   Abdominal: Soft. Bowel sounds are normal. He exhibits no distension. There is no tenderness.   Musculoskeletal: Normal range of motion. He exhibits no edema or tenderness.   Neurological: He is alert and oriented to person, place, and time.   Skin: Skin is warm and dry. No erythema. No pallor.       Significant Labs:   Recent Lab Results       01/11/20 2135        Albumin 3.9     Alkaline Phosphatase 130     ALT 40     Anion Gap 17     Aniso Slight     aPTT 59.0  Comment:  aPTT therapeutic range = 39-69 seconds     AST 25     Basophil% 0.0     BILIRUBIN TOTAL 0.4  Comment:  For infants and newborns, interpretation of results should be based  on gestational age, weight and in agreement with clinical  observations.  Premature Infant recommended reference ranges:  Up to 24 hours.............<8.0 mg/dL  Up to 48 hours............<12.0 mg/dL  3-5 days..................<15.0 mg/dL  6-29 days.................<15.0 mg/dL         Comment:  Values of less than 100 pg/ml are consistent with non-CHF populations.     BUN, Bld 24     Calcium 9.9     Chloride 97     CO2 24     Creatinine 1.9     Differential Method Manual  Comment:  Corrected result; previously reported as Automated on 01/11/2020 at    22:43.  [C]     eGFR if  46     eGFR if non  39  Comment:  Calculation used to obtain the estimated glomerular filtration  rate (eGFR) is the CKD-EPI equation.        Eosinophil% 0.0     Free T4 3.91     Large/Giant Platelets Present     Glucose 228     Gran% 76.0     Hematocrit 46.8     Hemoglobin 14.0     Immature Grans (Abs) CANCELED  Comment:  Mild elevation in immature granulocytes is non specific and   can be seen in a variety of conditions including stress response,   acute inflammation, trauma and pregnancy. Correlation with other   laboratory and clinical findings is essential.    Result canceled by the ancillary.       Immature Granulocytes CANCELED  Comment:  Result canceled by the ancillary.     Coumadin Monitoring INR 5.3  Comment:  Coumadin Therapy:  2.0 - 3.0 for INR for all indicators except mechanical heart valves  and antiphospholipid syndromes which should use 2.5 - 3.5.  INR  critical result(s) called and verbal readback obtained from   Vidhi Oliva by CK 01/11/2020 22:25       Lymph% 18.0     MCH 25.5     MCHC 29.9     MCV 85     Mono% 6.0     MPV 10.3     nRBC 0     Ovalocytes Occasional     Platelet Estimate Appears normal     Platelets 266     Poik Slight     Poly Occasional     Potassium 3.4     PROTEIN TOTAL 7.7     Protime 55.8     RBC 5.48     RDW 14.6     Sodium 138     Troponin I 0.068  Comment:  The reference interval for Troponin I represents the 99th percentile   cutoff   for our facility and is consistent with 3rd generation assay   performance.       TSH <0.010     WBC 7.71           Significant Imaging: as per hpi

## 2020-01-12 NOTE — PROGRESS NOTES
Notified Dr. Vasquez that patient was feeling jittery, had tremors and stated his HR was 115. Doppler checked was 92. Ordered for an additional dose of K+ 40mEq.

## 2020-01-12 NOTE — ASSESSMENT & PLAN NOTE
- underwent sedation + cardioversion in ER  - likely exacerbated due to acute decompensated heart failure in the setting of prednisone; will treat underlying etiology. Diuresing per primary team.  - continue IV amiodarone gtt till load completes, then PO amio 400mg x 2 weeks followed by 400mg daily. Also remains on lidocaine 1 mg/min -> recommend discontinuing lidocaine today  - WCT ddx includes VT vs. SVT with aberrancy vs. PMT; most likely PMT. Decreased monitoring and tracking zones as mentioned above   - formal interrogation of device today -> changed settings from DDD -> VVI at 50 bpm given PMT on interrogation   - will continue to monitor on telemetry  - routine labs; goal K > 4, Mg > 2  - patient will require generator change with addition of new RV lead/dual coil (if it's not optimally apical, e.g., or one with an SVC coil) vs/+ azygous coil or CS coil. Likely will be completed prior to discharge once HD optimized, INR has improved (currently 5)

## 2020-01-12 NOTE — ED NOTES
Pt determined to be in [place] rhythm upon assessment by Dr. Beckett. Dr. Beckett and sharmin CHAMBERLAIN at bedside for sedation and cardioversion.

## 2020-01-12 NOTE — SUBJECTIVE & OBJECTIVE
Past Medical History:   Diagnosis Date    CHF (congestive heart failure)     Dilated cardiomyopathy 1/10/2018    Disorder of kidney and ureter     CKD    Gout     HTN (hypertension)     Ventricular tachycardia (paroxysmal)        Past Surgical History:   Procedure Laterality Date    CARDIAC CATHETERIZATION  Dec. 2012    CARDIAC DEFIBRILLATOR PLACEMENT Left     CRRT-D    COLONOSCOPY N/A 3/6/2018    Procedure: COLONOSCOPY;  Surgeon: Alonso Bone MD;  Location: [facility] ENDO (2ND FLR);  Service: Endoscopy;  Laterality: N/A;    COLONOSCOPY N/A 7/17/2019    Procedure: COLONOSCOPY;  Surgeon: Blane Valdez MD;  Location: [facility] ENDO (2ND FLR);  Service: Endoscopy;  Laterality: N/A;    COLONOSCOPY N/A 7/18/2019    Procedure: COLONOSCOPY;  Surgeon: Blane Valdez MD;  Location: [facility] ENDO (2ND FLR);  Service: Endoscopy;  Laterality: N/A;    ESOPHAGOGASTRODUODENOSCOPY N/A 7/17/2019    Procedure: EGD (ESOPHAGOGASTRODUODENOSCOPY);  Surgeon: Blane Valdez MD;  Location: [facility] (2ND FLR);  Service: Endoscopy;  Laterality: N/A;    ESOPHAGOGASTRODUODENOSCOPY N/A 7/18/2019    Procedure: EGD (ESOPHAGOGASTRODUODENOSCOPY);  Surgeon: Blane Valdez MD;  Location: [facility] (2ND FLR);  Service: Endoscopy;  Laterality: N/A;    NONINVASIVE CARDIAC ELECTROPHYSIOLOGY STUDY N/A 10/18/2019    Procedure: CARDIAC ELECTROPHYSIOLOGY STUDY, NONINVASIVE;  Surgeon: Raz Wagner MD;  Location: [facility] EP LAB;  Service: Cardiology;  Laterality: N/A;  VT, DFTs, MDT CRTD in situ, LVAD, LASHELL pizarro, 3098    TREATMENT OF CARDIAC ARRHYTHMIA  10/18/2019    Procedure: Cardioversion or Defibrillation;  Surgeon: Raz Wagner MD;  Location: [facility] EP LAB;  Service: Cardiology;;       Review of patient's allergies indicates:   Allergen Reactions    Lisinopril Anaphylaxis       No current facility-administered medications on file prior to encounter.      Current Outpatient Medications on File Prior to Encounter   Medication Sig    allopurinol (ZYLOPRIM) 100  MG tablet TAKE 1 TABLET BY MOUTH EVERY DAY    amiodarone (PACERONE) 400 MG tablet Take 1 tablet (400 mg total) by mouth once daily.    amLODIPine (NORVASC) 10 MG tablet Take 1 tablet (10 mg total) by mouth once daily.    doxazosin (CARDURA) 8 MG Tab Take 1 tablet (8 mg total) by mouth every 12 (twelve) hours.    ferrous gluconate (FERGON) 324 MG tablet Take 1 tablet (324 mg total) by mouth daily with breakfast.    magnesium oxide (MAG-OX) 400 mg (241.3 mg magnesium) tablet TAKE 1 TABLET (400 MG TOTAL) BY MOUTH 3 (THREE) TIMES DAILY.    methIMAzole (TAPAZOLE) 10 MG Tab TAKE 2 TABLETS BY MOUTH EVERY MORNING AND TAKE 1 TABLET EVERY EVENING    pantoprazole (PROTONIX) 40 MG tablet TAKE 1 TABLET (40 MG TOTAL) BY MOUTH ONCE DAILY.    potassium chloride (K-TAB) 20 mEq Take 20 mEq (1 tab) twice daily on M-W-F. Take 20 mEq (1 tab) once daily on Tu-Th-Sat-Geller    predniSONE (DELTASONE) 20 MG tablet Take 3 tablets (60 mg total) by mouth once daily.    spironolactone (ALDACTONE) 25 MG tablet Take 1 tablet (25 mg total) by mouth once daily.    warfarin (COUMADIN) 5 MG tablet Take 1.5 tabs by mouth on 7/23 only, followed by weekly dose of 1 tablet daily, except 1.5 tabs on Mon, Wed, Fri    mesalamine (PENTASA) 250 mg CpSR Take 4 capsules (1,000 mg total) by mouth 4 (four) times daily.    sildenafil (VIAGRA) 100 MG tablet Take 1 tablet (100 mg total) by mouth daily as needed for Erectile Dysfunction.    torsemide (DEMADEX) 20 MG Tab Take 40 mg (2 tabs) twice a day on M-W-F. Take 40 mg (2 tabs) once a day Pk-Yk-Bx-Sat     Family History     Problem Relation (Age of Onset)    Cancer Sister (54)    Coronary artery disease Father    Diabetes Father    Heart disease Father    Hypertension Father    No Known Problems Mother, Brother        Tobacco Use    Smoking status: Former Smoker     Packs/day: 1.00     Years: 31.00     Pack years: 31.00     Types: Cigarettes     Last attempt to quit: 1/12/2018     Years since quitting:  2.0    Smokeless tobacco: Never Used    Tobacco comment: pt is quiting on his own - pt stated not qualified for program; 2/16 pt  quit on his own   Substance and Sexual Activity    Alcohol use: No     Alcohol/week: 0.0 standard drinks     Comment: one fifth of gin or rum/week    Drug use: No    Sexual activity: Yes     Partners: Female     Birth control/protection: None     Comment: 10/5/17  with same partner 7 years      Review of Systems   Constitution: Positive for diaphoresis, malaise/fatigue and weight gain. Negative for chills and fever.   Cardiovascular: Positive for orthopnea and paroxysmal nocturnal dyspnea. Negative for chest pain, dyspnea on exertion, irregular heartbeat, near-syncope and syncope.   Respiratory: Negative for shortness of breath.    Gastrointestinal: Negative for nausea.   Neurological: Negative for headaches and weakness.     Objective:     Vital Signs (Most Recent):  Temp: 98.2 °F (36.8 °C) (01/11/20 2246)  Pulse: 75 (01/12/20 0219)  Resp: 18 (01/11/20 2246)  BP: 102/76 (01/12/20 0219)  SpO2: 98 % (01/12/20 0219) Vital Signs (24h Range):  Temp:  [98 °F (36.7 °C)-98.2 °F (36.8 °C)] 98.2 °F (36.8 °C)  Pulse:  [] 75  Resp:  [18-20] 18  SpO2:  [95 %-99 %] 98 %  BP: ()/(53-88) 102/76     Weight: 124.7 kg (274 lb 14.6 oz)  Body mass index is 36.27 kg/m².    SpO2: 98 %  O2 Device (Oxygen Therapy): Non Rebreather Mask      Intake/Output Summary (Last 24 hours) at 1/12/2020 0248  Last data filed at 1/12/2020 0201  Gross per 24 hour   Intake 100 ml   Output 600 ml   Net -500 ml       Lines/Drains/Airways     Line                 VAD 03/08/18 1124 Left ventricular assist device HeartMate  days          Peripheral Intravenous Line                 Peripheral IV - Single Lumen 01/11/20 2134 20 G Left Forearm less than 1 day         Peripheral IV - Single Lumen 01/11/20 2316 20 G Right Forearm less than 1 day         Peripheral IV - Single Lumen 01/12/20 0004 20 G Right  Forearm less than 1 day         Peripheral IV - Single Lumen 01/12/20 0020 18 G Left Hand less than 1 day                Physical Exam   Constitutional: He is oriented to person, place, and time. He appears well-developed and well-nourished.   HENT:   Head: Normocephalic and atraumatic.   Eyes: Pupils are equal, round, and reactive to light. Conjunctivae and EOM are normal.   Neck: Normal range of motion. Neck supple. No JVD present.   Cardiovascular: Normal rate and regular rhythm. Exam reveals no gallop and no friction rub.   No murmur heard.  VAD hum audible   Pulmonary/Chest: Effort normal and breath sounds normal. No respiratory distress. He has no wheezes. He has no rales. He exhibits no tenderness.   Abdominal: Soft. Bowel sounds are normal. He exhibits no distension. There is no tenderness.   Musculoskeletal: Normal range of motion. He exhibits no edema or tenderness.   Neurological: He is alert and oriented to person, place, and time.   Skin: Skin is warm and dry. No erythema. No pallor.   Psychiatric: His behavior is normal. Judgment and thought content normal.   Vitals reviewed.      Significant Labs:   CMP   Recent Labs   Lab 01/11/20  2135      K 3.4*   CL 97   CO2 24   *   BUN 24*   CREATININE 1.9*   CALCIUM 9.9   PROT 7.7   ALBUMIN 3.9   BILITOT 0.4   ALKPHOS 130   AST 25   ALT 40   ANIONGAP 17*   ESTGFRAFRICA 46*   EGFRNONAA 39*    and CBC   Recent Labs   Lab 01/11/20  2135   WBC 7.71   HGB 14.0   HCT 46.8          Significant Imaging: All imaging in the last 24 hours reviewed

## 2020-01-12 NOTE — PROCEDURES
Device Programming Note - changed tachytherapies for VT and added ATP before charging for VF. Also reserved polarity for VT therapies    Medtronic CRT-D Implanted 10/31/14 by Dr. Nader Chiu     Remaining longevity: 5 months.   Mode DDD, mode switch at 171 bpm.   Lower rate: 50 bpm, Upper rate 120 bpm.   Paced/sensed AV: 300/300 ms     Detection         Monitor            Rates               Therapies  AT/AF              Monitor                        > 171 bpm       All Rx Off  VF                    On                   > 231 bpm     ATP before charging,   35J x 6  FVT                 OFF                                         Detection is OFF, 35J x 6  VT                    On                   161-231 bpm   Burst (10), Ramp (10),  35J x 4    Mallory Bowser MD  Cardiology - PGY4  Pager [phone]

## 2020-01-12 NOTE — ED TRIAGE NOTES
Pt presents to ED after being shocked by his AICD 7 times within the last 10 minutes PTA. Pt states he had just finished eating and was sitting down watching tv when he felt his heart racing, he stood up then got shocked. He was also shocked once yesterday. Pt states last time this happened, he was in vtach they had to shock him back into rhythm. Denies chest pain when not being shocked though does feel some fluttering, more pronounced than usual. He also c/o associated shortness of breath. Denies nausea/vomiting. Pt AAO x 4, wife at bedside.    Pt hooked up to monitor and pads, currently in stable v tach per MD.

## 2020-01-12 NOTE — CONSULTS
[facility]  Interventional Cardiology  Consult Note    Patient Name: Tim Richards  MRN: [id number]  Admission Date: 1/11/2020  Hospital Length of Stay: 0 days  Code Status: Prior   Attending Provider: Cassy Beckett MD  Consulting Provider: Vijay Khan MD  Primary Care Physician: Primary Doctor No  Principal Problem:Torsades de pointes    Patient information was obtained from patient and past medical records.     Inpatient consult to Cardiology  Consult performed by: Vijay Khan MD  Consult ordered by: Cassy Beckett MD        Subjective:     Chief Complaint:  TdP     HPI:  53 yo male with pmh sig for DCM,(EF 10%, grade III DD) s/p HM II with Outflow graft changed (03/9/18) as DT, BiV-Icd Medtronic (2014), HtN, obesity transferred from  for VT now in VF, shocked by device x 7.  Has been taking prednisone for hyperthyroidism which has caused him to eat/retain fluid and gain 13lbs as of late.  On exam he is decompensated, k 3.4,  higher than prior, creat 1.9 around baseline, INR 5.3.     He was admitted to the hospital in 10/2019 with similar situation 2/2 ICD shocks. MMVT -- multiple rounds of ATP--degenerated the MMVT into VF and the he was unsuccessfully shocked multiple times . He was eventually shocked externally to convert into sinus rhythm .  It was postulated reasons for ineffective therapy (had worsening hypervolemia week prior, length of time in the arrhythmia prior to ICD shock, IV amiodarone causing higher defibrillatory tissue threshold, or change in heart-shock vector post LVAD implant). He had a NIPS which showed successful DFT at 35J. He was discharge after being diuresed. Device check noted on 12/14/19 - one episode of MMVT 270 ms that was appropriately successfully shocked with multiple episodes of non sustained slower VT. Of note he had a monitoring zone added during his admission at 133. VT 1 zone at 167 -- 6 rounds of ATP added before shock in  that zone.      TTE demonstrated EF >10%, AV does not open, IVF dilated 2.5cm with respiratory variation >50%, Dilated RV / LV.    Past Medical History:   Diagnosis Date    CHF (congestive heart failure)     Dilated cardiomyopathy 1/10/2018    Disorder of kidney and ureter     CKD    Gout     HTN (hypertension)     Ventricular tachycardia (paroxysmal)        Past Surgical History:   Procedure Laterality Date    CARDIAC CATHETERIZATION  Dec. 2012    CARDIAC DEFIBRILLATOR PLACEMENT Left     CRRT-D    COLONOSCOPY N/A 3/6/2018    Procedure: COLONOSCOPY;  Surgeon: Alonso Bone MD;  Location: [facility] ENDO (2ND FLR);  Service: Endoscopy;  Laterality: N/A;    COLONOSCOPY N/A 7/17/2019    Procedure: COLONOSCOPY;  Surgeon: Blane Valdez MD;  Location: [facility] ENDO (2ND FLR);  Service: Endoscopy;  Laterality: N/A;    COLONOSCOPY N/A 7/18/2019    Procedure: COLONOSCOPY;  Surgeon: Blane Valdez MD;  Location: [facility] ENDO (2ND FLR);  Service: Endoscopy;  Laterality: N/A;    ESOPHAGOGASTRODUODENOSCOPY N/A 7/17/2019    Procedure: EGD (ESOPHAGOGASTRODUODENOSCOPY);  Surgeon: Blane Valdez MD;  Location: [facility] ENDO (2ND FLR);  Service: Endoscopy;  Laterality: N/A;    ESOPHAGOGASTRODUODENOSCOPY N/A 7/18/2019    Procedure: EGD (ESOPHAGOGASTRODUODENOSCOPY);  Surgeon: Blane Valdez MD;  Location: [facility] ENDO (2ND FLR);  Service: Endoscopy;  Laterality: N/A;    NONINVASIVE CARDIAC ELECTROPHYSIOLOGY STUDY N/A 10/18/2019    Procedure: CARDIAC ELECTROPHYSIOLOGY STUDY, NONINVASIVE;  Surgeon: Raz Wagner MD;  Location: [facility] EP LAB;  Service: Cardiology;  Laterality: N/A;  VT, DFTs, MDT CRTD in situ, LVAD, anes, MB, 3098    TREATMENT OF CARDIAC ARRHYTHMIA  10/18/2019    Procedure: Cardioversion or Defibrillation;  Surgeon: Raz Wagner MD;  Location: [facility] EP LAB;  Service: Cardiology;;       Review of patient's allergies indicates:   Allergen Reactions    Lisinopril Anaphylaxis         (Not in a hospital admission)  Family History      Problem Relation (Age of Onset)    Cancer Sister (54)    Coronary artery disease Father    Diabetes Father    Heart disease Father    Hypertension Father    No Known Problems Mother, Brother        Tobacco Use    Smoking status: Former Smoker     Packs/day: 1.00     Years: 31.00     Pack years: 31.00     Types: Cigarettes     Last attempt to quit: 2018     Years since quittin.9    Smokeless tobacco: Never Used    Tobacco comment: pt is quiting on his own - pt stated not qualified for program;  pt  quit on his own   Substance and Sexual Activity    Alcohol use: No     Alcohol/week: 0.0 standard drinks     Comment: one fifth of gin or rum/week    Drug use: No    Sexual activity: Yes     Partners: Female     Birth control/protection: None     Comment: 10/5/17  with same partner 7 years      Review of Systems   Constitution: Positive for diaphoresis, malaise/fatigue and weight gain.   HENT: Negative.    Eyes: Negative.    Cardiovascular: Positive for orthopnea and paroxysmal nocturnal dyspnea.   Respiratory: Negative.    Endocrine: Negative.    Skin: Negative.    Musculoskeletal: Negative.    Gastrointestinal: Negative.    Genitourinary: Negative.    Neurological: Negative.      Objective:     Vital Signs (Most Recent):  Temp: 98.2 °F (36.8 °C) (20)  Pulse: (!) 130 (20)  Resp: 18 (20)  BP: 130/88(Automated Cuff per EMS ) (20)  SpO2: 98 % (20) Vital Signs (24h Range):  Temp:  [98 °F (36.7 °C)-98.2 °F (36.8 °C)] 98.2 °F (36.8 °C)  Pulse:  [130] 130  Resp:  [18-] 18  SpO2:  [98 %] 98 %  BP: (130)/(88) 130/88     Weight: 124.7 kg (274 lb 14.6 oz)  Body mass index is 36.27 kg/m².    SpO2: 98 %  O2 Device (Oxygen Therapy): nasal cannula      Intake/Output Summary (Last 24 hours) at 2020 4259  Last data filed at 2020 1844  Gross per 24 hour   Intake 100 ml   Output --   Net 100 ml       Lines/Drains/Airways     Line                 VAD  03/08/18 1124 Left ventricular assist device HeartMate  days          Peripheral Intravenous Line                 Peripheral IV - Single Lumen 01/11/20 2134 20 G Left Forearm less than 1 day         Peripheral IV - Single Lumen 01/11/20 2316 20 G Right Forearm less than 1 day                Physical Exam   Constitutional: He is oriented to person, place, and time. He appears well-developed and well-nourished.   HENT:   Head: Normocephalic and atraumatic.   Eyes: Pupils are equal, round, and reactive to light. Conjunctivae and EOM are normal.   Neck: Normal range of motion. Neck supple. JVD present.   Cardiovascular: Exam reveals no gallop and no friction rub.   No murmur heard.  VAD hum audible   Pulmonary/Chest: Effort normal and breath sounds normal. No respiratory distress. He has no wheezes. He has no rales. He exhibits no tenderness.   Abdominal: Soft. Bowel sounds are normal. He exhibits no distension. There is no tenderness.   Musculoskeletal: Normal range of motion. He exhibits no edema or tenderness.   Neurological: He is alert and oriented to person, place, and time.   Skin: Skin is warm and dry. No erythema. No pallor.       Significant Labs:   Recent Lab Results       01/11/20 2135        Albumin 3.9     Alkaline Phosphatase 130     ALT 40     Anion Gap 17     Aniso Slight     aPTT 59.0  Comment:  aPTT therapeutic range = 39-69 seconds     AST 25     Basophil% 0.0     BILIRUBIN TOTAL 0.4  Comment:  For infants and newborns, interpretation of results should be based  on gestational age, weight and in agreement with clinical  observations.  Premature Infant recommended reference ranges:  Up to 24 hours.............<8.0 mg/dL  Up to 48 hours............<12.0 mg/dL  3-5 days..................<15.0 mg/dL  6-29 days.................<15.0 mg/dL         Comment:  Values of less than 100 pg/ml are consistent with non-CHF populations.     BUN, Bld 24     Calcium 9.9     Chloride 97     CO2 24      Creatinine 1.9     Differential Method Manual  Comment:  Corrected result; previously reported as Automated on 01/11/2020 at   22:43.  [C]     eGFR if  46     eGFR if non  39  Comment:  Calculation used to obtain the estimated glomerular filtration  rate (eGFR) is the CKD-EPI equation.        Eosinophil% 0.0     Free T4 3.91     Large/Giant Platelets Present     Glucose 228     Gran% 76.0     Hematocrit 46.8     Hemoglobin 14.0     Immature Grans (Abs) CANCELED  Comment:  Mild elevation in immature granulocytes is non specific and   can be seen in a variety of conditions including stress response,   acute inflammation, trauma and pregnancy. Correlation with other   laboratory and clinical findings is essential.    Result canceled by the ancillary.       Immature Granulocytes CANCELED  Comment:  Result canceled by the ancillary.     Coumadin Monitoring INR 5.3  Comment:  Coumadin Therapy:  2.0 - 3.0 for INR for all indicators except mechanical heart valves  and antiphospholipid syndromes which should use 2.5 - 3.5.  INR  critical result(s) called and verbal readback obtained from   Vidhi Oliva by CK 01/11/2020 22:25       Lymph% 18.0     MCH 25.5     MCHC 29.9     MCV 85     Mono% 6.0     MPV 10.3     nRBC 0     Ovalocytes Occasional     Platelet Estimate Appears normal     Platelets 266     Poik Slight     Poly Occasional     Potassium 3.4     PROTEIN TOTAL 7.7     Protime 55.8     RBC 5.48     RDW 14.6     Sodium 138     Troponin I 0.068  Comment:  The reference interval for Troponin I represents the 99th percentile   cutoff   for our facility and is consistent with 3rd generation assay   performance.       TSH <0.010     WBC 7.71           Significant Imaging: as per hpi    Assessment and Plan:     * Torsades de pointes  Sedated with propofol  Externally defibrillated with 200J to sinus  QTc 500  Continue amio gtt   Diurese aggressively -- lasix 100mg iv x 1 followed by drip  at 10/hr  Interrogate device now  Discussed with EP    LVAD (left ventricular assist device) present  HMII 9800  INR goal: 2-3   NO Bridge with Heparin  INR 5.3, hold warfarin and check inr daily resume when close to 3 to prevent dipping below 2  No active bleeding, no need for reversal    Acute on chronic combined systolic and diastolic congestive heart failure  Lasix as described above  Accurate I/o/dw/2gm na  Admit to HTS  Consider AL reduction with hydral/isordil        VTE Risk Mitigation (From admission, onward)    None        Discussed with EP  Discussed with HTS  Discussed with cards fellow on call  Device interrogation in progress    Thank you for your consult. I will sign off. Please contact us if you have any additional questions.    Vijay Khan MD  Interventional Cardiology   [facility]

## 2020-01-12 NOTE — SUBJECTIVE & OBJECTIVE
Past Medical History:   Diagnosis Date    CHF (congestive heart failure)     Dilated cardiomyopathy 1/10/2018    Disorder of kidney and ureter     CKD    Gout     HTN (hypertension)     Ventricular tachycardia (paroxysmal)        Past Surgical History:   Procedure Laterality Date    CARDIAC CATHETERIZATION  Dec. 2012    CARDIAC DEFIBRILLATOR PLACEMENT Left     CRRT-D    COLONOSCOPY N/A 3/6/2018    Procedure: COLONOSCOPY;  Surgeon: Alonso Bone MD;  Location: [facility] ENDO (2ND FLR);  Service: Endoscopy;  Laterality: N/A;    COLONOSCOPY N/A 7/17/2019    Procedure: COLONOSCOPY;  Surgeon: Blane Valdez MD;  Location: [facility] ENDO (2ND FLR);  Service: Endoscopy;  Laterality: N/A;    COLONOSCOPY N/A 7/18/2019    Procedure: COLONOSCOPY;  Surgeon: Blane Valdez MD;  Location: [facility] ENDO (2ND FLR);  Service: Endoscopy;  Laterality: N/A;    ESOPHAGOGASTRODUODENOSCOPY N/A 7/17/2019    Procedure: EGD (ESOPHAGOGASTRODUODENOSCOPY);  Surgeon: Blane Valdez MD;  Location: [facility] (2ND FLR);  Service: Endoscopy;  Laterality: N/A;    ESOPHAGOGASTRODUODENOSCOPY N/A 7/18/2019    Procedure: EGD (ESOPHAGOGASTRODUODENOSCOPY);  Surgeon: Blane Valdez MD;  Location: [facility] (2ND FLR);  Service: Endoscopy;  Laterality: N/A;    NONINVASIVE CARDIAC ELECTROPHYSIOLOGY STUDY N/A 10/18/2019    Procedure: CARDIAC ELECTROPHYSIOLOGY STUDY, NONINVASIVE;  Surgeon: Raz Wagner MD;  Location: [facility] EP LAB;  Service: Cardiology;  Laterality: N/A;  VT, DFTs, MDT CRTD in situ, LVAD, LASHELL pizarro, 3098    TREATMENT OF CARDIAC ARRHYTHMIA  10/18/2019    Procedure: Cardioversion or Defibrillation;  Surgeon: Raz Wagner MD;  Location: [facility] EP LAB;  Service: Cardiology;;       Review of patient's allergies indicates:   Allergen Reactions    Lisinopril Anaphylaxis       No current facility-administered medications on file prior to encounter.      Current Outpatient Medications on File Prior to Encounter   Medication Sig    allopurinol (ZYLOPRIM) 100  MG tablet TAKE 1 TABLET BY MOUTH EVERY DAY    amiodarone (PACERONE) 400 MG tablet Take 1 tablet (400 mg total) by mouth once daily.    amLODIPine (NORVASC) 10 MG tablet Take 1 tablet (10 mg total) by mouth once daily.    doxazosin (CARDURA) 8 MG Tab Take 1 tablet (8 mg total) by mouth every 12 (twelve) hours.    ferrous gluconate (FERGON) 324 MG tablet Take 1 tablet (324 mg total) by mouth daily with breakfast.    magnesium oxide (MAG-OX) 400 mg (241.3 mg magnesium) tablet TAKE 1 TABLET (400 MG TOTAL) BY MOUTH 3 (THREE) TIMES DAILY.    methIMAzole (TAPAZOLE) 10 MG Tab TAKE 2 TABLETS BY MOUTH EVERY MORNING AND TAKE 1 TABLET EVERY EVENING    pantoprazole (PROTONIX) 40 MG tablet TAKE 1 TABLET (40 MG TOTAL) BY MOUTH ONCE DAILY.    potassium chloride (K-TAB) 20 mEq Take 20 mEq (1 tab) twice daily on M-W-F. Take 20 mEq (1 tab) once daily on Tu-Th-Sat-Geller    predniSONE (DELTASONE) 20 MG tablet Take 3 tablets (60 mg total) by mouth once daily.    spironolactone (ALDACTONE) 25 MG tablet Take 1 tablet (25 mg total) by mouth once daily.    warfarin (COUMADIN) 5 MG tablet Take 1.5 tabs by mouth on 7/23 only, followed by weekly dose of 1 tablet daily, except 1.5 tabs on Mon, Wed, Fri    mesalamine (PENTASA) 250 mg CpSR Take 4 capsules (1,000 mg total) by mouth 4 (four) times daily.    sildenafil (VIAGRA) 100 MG tablet Take 1 tablet (100 mg total) by mouth daily as needed for Erectile Dysfunction.    torsemide (DEMADEX) 20 MG Tab Take 40 mg (2 tabs) twice a day on M-W-F. Take 40 mg (2 tabs) once a day Ei-Vh-Li-Sat     Family History     Problem Relation (Age of Onset)    Cancer Sister (54)    Coronary artery disease Father    Diabetes Father    Heart disease Father    Hypertension Father    No Known Problems Mother, Brother        Tobacco Use    Smoking status: Former Smoker     Packs/day: 1.00     Years: 31.00     Pack years: 31.00     Types: Cigarettes     Last attempt to quit: 1/12/2018     Years since quitting:  2.0    Smokeless tobacco: Never Used    Tobacco comment: pt is quiting on his own - pt stated not qualified for program; 2/16 pt  quit on his own   Substance and Sexual Activity    Alcohol use: No     Alcohol/week: 0.0 standard drinks     Comment: one fifth of gin or rum/week    Drug use: No    Sexual activity: Yes     Partners: Female     Birth control/protection: None     Comment: 10/5/17  with same partner 7 years      Review of Systems   Constitution: Positive for diaphoresis, malaise/fatigue and weight gain. Negative for chills and fever.   Cardiovascular: Positive for orthopnea and paroxysmal nocturnal dyspnea. Negative for chest pain, dyspnea on exertion, irregular heartbeat, near-syncope and syncope.   Respiratory: Negative for shortness of breath.    Gastrointestinal: Negative for nausea.   Neurological: Negative for headaches and weakness.     Objective:     Vital Signs (Most Recent):  Temp: 97.7 °F (36.5 °C) (01/12/20 0700)  Pulse: 71 (01/12/20 0833)  Resp: (!) 26 (01/12/20 0833)  BP: (!) 90/0 (01/12/20 0825)  SpO2: 100 % (01/12/20 0833) Vital Signs (24h Range):  Temp:  [97.7 °F (36.5 °C)-98.2 °F (36.8 °C)] 97.7 °F (36.5 °C)  Pulse:  [] 71  Resp:  [18-33] 26  SpO2:  [94 %-100 %] 100 %  BP: ()/(0-88) 90/0       Weight: 121.5 kg (267 lb 13.7 oz)  Body mass index is 35.34 kg/m².    SpO2: 100 %  O2 Device (Oxygen Therapy): room air    Physical Exam   Constitutional: He is oriented to person, place, and time. He appears well-developed and well-nourished.   HENT:   Head: Normocephalic and atraumatic.   Eyes: Pupils are equal, round, and reactive to light. Conjunctivae and EOM are normal.   Neck: Normal range of motion. Neck supple. No JVD present.   Cardiovascular: Normal rate and regular rhythm. Exam reveals no gallop and no friction rub.   No murmur heard.  VAD hum audible   Pulmonary/Chest: Effort normal and breath sounds normal. No respiratory distress. He has no wheezes. He has no  rales. He exhibits no tenderness.   Abdominal: Soft. Bowel sounds are normal. He exhibits no distension. There is no tenderness.   Musculoskeletal: Normal range of motion. He exhibits no edema or tenderness.   Neurological: He is alert and oriented to person, place, and time.   Skin: Skin is warm and dry. No erythema. No pallor.   Psychiatric: His behavior is normal. Judgment and thought content normal.   Vitals reviewed.      Significant Labs:   CMP:   Recent Labs   Lab 01/11/20 2135 01/12/20 0311    138   K 3.4* 3.3*   CL 97 99   CO2 24 27   * 125*   BUN 24* 25*   CREATININE 1.9* 1.9*   CALCIUM 9.9 9.4   PROT 7.7  --    ALBUMIN 3.9  --    BILITOT 0.4  --    ALKPHOS 130  --    AST 25  --    ALT 40  --    ANIONGAP 17* 12   ESTGFRAFRICA 46* 45.5*   EGFRNONAA 39* 39.4*    and CBC:   Recent Labs   Lab 01/11/20 2135 01/12/20 0311   WBC 7.71 9.59   HGB 14.0 12.7*   HCT 46.8 42.1    228     Significant Imaging: All imaging in the last 24 hours reviewed

## 2020-01-12 NOTE — H&P
[facility]  Heart Transplant  H&P    Patient Name: Tim Richards  MRN: [id number]  Admission Date: 1/11/2020  Attending Physician: Bettye Connors MD  Primary Care Provider: Primary Doctor No  Principal Problem:Torsades de pointes    Subjective:     History of Present Illness:  Tim Richards is a 54 y.o. M with DCM,(EF 10%, grade III DD) s/p HM II with Outflow graft changed (03/9/18) as DT, BiV-Icd Medtronic (2014), HtN, obesity transferred from  for VT now in VF, shocked by device x 7 times. He reports he was sitting on the couch when the first shock occurred without warning followed by subsequent shocks every few minutes. Pt reports feeling palpitations before the shock but denies any chest pain or shortness of breath preceding the event. He does report 6-pillow orthopnea but denies any PND. He has chronic SOBOE.     Recently, he has been taking prednisone for hyperthyroidism which has caused him to eat and drink more than usual. As a result, he was gained ~13lbs.     He was noted to be in VT at  ER, started on amiodarone gtt and transferred here.  On arrival, he did have chest pain, SOB, dizziness and was diaphoretic. His rhythm was suggestive of VF/tDP for which he received conscious sedation and external cardioversion with 200J and given IV Mg. He had an elevated BNP (522), K 3.4, Mg 1.8, INR 5.3. He was sedated with propofol and externally cardioverted with 200J into NSR. He is being admitted for further monitoring. Of note, TTE demonstrated EF >10%, AV does not open, IVF dilated 2.5cm with respiratory variation >50%, Dilated RV / LV.     He has prior history of VT in 10/2019 at which time he had MMVT underwent several rounds of ATP and degenerated into VF for which he was externally cardioverted into sinus rhythm. It was postulated reasons for ineffective therapy (had worsening hypervolemia week prior, length of time in the arrhythmia prior to ICD shock, IV amiodarone causing  higher defibrillatory tissue threshold, or change in heart-shock vector post LVAD implant). He had a NIPS which showed successful DFT at 35J. He was discharge after being diuresed. Device check noted on 12/14/19 - one episode of MMVT 270 ms that was appropriately successfully shocked with multiple episodes of non sustained slower VT. Of note he had a monitoring zone added during his admission at 133. VT 1 zone at 167 -- 6 rounds of ATP added before shock in that zone.        Past Medical History:   Diagnosis Date    CHF (congestive heart failure)     Dilated cardiomyopathy 1/10/2018    Disorder of kidney and ureter     CKD    Gout     HTN (hypertension)     Ventricular tachycardia (paroxysmal)        Past Surgical History:   Procedure Laterality Date    CARDIAC CATHETERIZATION  Dec. 2012    CARDIAC DEFIBRILLATOR PLACEMENT Left     CRRT-D    COLONOSCOPY N/A 3/6/2018    Procedure: COLONOSCOPY;  Surgeon: Alonso Bone MD;  Location: [facility] ([address] [place] [address]);  Service: Endoscopy;  Laterality: N/A;    COLONOSCOPY N/A 7/17/2019    Procedure: COLONOSCOPY;  Surgeon: Blane Valdez MD;  Location: [facility] ([address] [place] [address]);  Service: Endoscopy;  Laterality: N/A;    COLONOSCOPY N/A 7/18/2019    Procedure: COLONOSCOPY;  Surgeon: Blane Valdez MD;  Location: [facility] ([address] [place] [address]);  Service: Endoscopy;  Laterality: N/A;    ESOPHAGOGASTRODUODENOSCOPY N/A 7/17/2019    Procedure: EGD (ESOPHAGOGASTRODUODENOSCOPY);  Surgeon: Blane Valdez MD;  Location: [address]);  Service: Endoscopy;  Laterality: N/A;    ESOPHAGOGASTRODUODENOSCOPY N/A 7/18/2019    Procedure: EGD (ESOPHAGOGASTRODUODENOSCOPY);  Surgeon: Blane Valdez MD;  Location: [facility] ([address] [place] [address]);  Service: Endoscopy;  Laterality: N/A;    NONINVASIVE CARDIAC ELECTROPHYSIOLOGY STUDY N/A 10/18/2019    Procedure: CARDIAC ELECTROPHYSIOLOGY STUDY, NONINVASIVE;  Surgeon: Raz Wagner MD;  Location: [facility] EP LAB;  Service: Cardiology;  Laterality: N/A;  VT, DFTs, MDT CRTD in situ,  LVAD, LASHELL pizarro, 3098    TREATMENT OF CARDIAC ARRHYTHMIA  10/18/2019    Procedure: Cardioversion or Defibrillation;  Surgeon: Raz Wagner MD;  Location: [facility] LAB;  Service: Cardiology;;       Review of patient's allergies indicates:   Allergen Reactions    Lisinopril Anaphylaxis       Current Facility-Administered Medications   Medication    acetaminophen tablet 650 mg    allopurinol tablet 100 mg    amiodarone 360 mg/200 mL (1.8 mg/mL) infusion    amLODIPine tablet 10 mg    doxazosin tablet 8 mg    ferrous gluconate tablet 324 mg    furosemide (LASIX) 2 mg/mL in sodium chloride 0.9% 100 mL infusion (conc: 2 mg/mL)    magnesium oxide tablet 400 mg    ondansetron disintegrating tablet 4 mg    oxyCODONE immediate release tablet 10 mg    pantoprazole EC tablet 40 mg    predniSONE tablet 60 mg     Family History     Problem Relation (Age of Onset)    Cancer Sister (54)    Coronary artery disease Father    Diabetes Father    Heart disease Father    Hypertension Father    No Known Problems Mother, Brother        Tobacco Use    Smoking status: Former Smoker     Packs/day: 1.00     Years: 31.00     Pack years: 31.00     Types: Cigarettes     Last attempt to quit: 2018     Years since quittin.0    Smokeless tobacco: Never Used    Tobacco comment: pt is quiting on his own - pt stated not qualified for program;  pt  quit on his own   Substance and Sexual Activity    Alcohol use: No     Alcohol/week: 0.0 standard drinks     Comment: one fifth of gin or rum/week    Drug use: No    Sexual activity: Yes     Partners: Female     Birth control/protection: None     Comment: 10/5/17  with same partner 7 years      Review of Systems   Constitutional: Positive for appetite change (increased). Negative for activity change (decreased).   Respiratory: Positive for shortness of breath (on exertion). Negative for chest tightness.    Cardiovascular: Negative for chest pain, palpitations and  leg swelling.   Gastrointestinal: Negative for constipation, diarrhea, nausea and vomiting.   Genitourinary: Negative for dysuria.   Musculoskeletal: Negative for back pain.   Neurological: Negative for dizziness and headaches.   Psychiatric/Behavioral: Negative for confusion.     Objective:     Vital Signs (Most Recent):  Temp: 98.2 °F (36.8 °C) (01/11/20 2246)  Pulse: 75 (01/12/20 0219)  Resp: 18 (01/11/20 2246)  BP: 102/76 (01/12/20 0219)  SpO2: 98 % (01/12/20 0219) Vital Signs (24h Range):  Temp:  [98 °F (36.7 °C)-98.2 °F (36.8 °C)] 98.2 °F (36.8 °C)  Pulse:  [] 75  Resp:  [18-20] 18  SpO2:  [95 %-99 %] 98 %  BP: ()/(53-88) 102/76     Patient Vitals for the past 72 hrs (Last 3 readings):   Weight   01/12/20 0400 121.5 kg (267 lb 13.7 oz)   01/11/20 2246 124.7 kg (274 lb 14.6 oz)     Body mass index is 35.34 kg/m².      Intake/Output Summary (Last 24 hours) at 1/12/2020 0440  Last data filed at 1/12/2020 0201  Gross per 24 hour   Intake 100 ml   Output 600 ml   Net -500 ml       Physical Exam   Constitutional: He is oriented to person, place, and time. He appears well-developed.   Neck: No JVD present.   Cardiovascular: Normal rate.   Murmur (VAD) heard.  Pulmonary/Chest: Effort normal and breath sounds normal.   Abdominal: Soft. Bowel sounds are normal.   Musculoskeletal: Normal range of motion. He exhibits no edema.   Neurological: He is alert and oriented to person, place, and time. No cranial nerve deficit.   Skin: Skin is warm and dry. Capillary refill takes less than 2 seconds.     Significant Labs:  CBC:  Recent Labs   Lab 01/11/20 2135 01/12/20 0311   WBC 7.71 9.59   RBC 5.48 4.87   HGB 14.0 12.7*   HCT 46.8 42.1    228   MCV 85 86   MCH 25.5* 26.1*   MCHC 29.9* 30.2*     BNP:  Recent Labs   Lab 01/11/20 2135   *     CMP:  Recent Labs   Lab 01/11/20 2135 01/12/20  0311   * 125*   CALCIUM 9.9 9.4   ALBUMIN 3.9  --    PROT 7.7  --     138   K 3.4* 3.3*   CO2 24 27    CL 97 99   BUN 24* 25*   CREATININE 1.9* 1.9*   ALKPHOS 130  --    ALT 40  --    AST 25  --    BILITOT 0.4  --       Coagulation:   Recent Labs   Lab 01/11/20  2135 01/12/20  0230 01/12/20  0311   INR 5.3* 5.1* 5.0*   APTT 59.0*  --  43.4*     LDH:  Recent Labs   Lab 01/12/20  0311   *     Microbiology:  Microbiology Results (last 7 days)     ** No results found for the last 168 hours. **        I have reviewed all pertinent labs within the past 24 hours.    Diagnostic Results:  I have reviewed and interpreted all pertinent imaging results/findings within the past 24 hours.    Device Interrogation:   Medtronic CRT-D Implanted 10/31/14 by Dr. Nader Chiu    Remaining longevity: 5 months.   Mode DDD, mode switch at 171 bpm.   Lower rate: 50 bpm, Upper rate 120 bpm.   Paced/sensed AV: 300/300 ms    Detection  Monitor  Rates  Therapies  AT/AF  Monitor  > 171 bpm  All Rx Off  VF  On   > 231 bpm  35J x 6  FVT  OFF    Detection is OFF, 35J x 6  VT  On   161-231 bpm Burst (3), Burst (3), Burst (3), Ramp (3), Ramp (3), Ramp (3)    Episode 323: on 1/11/20 at 21:09   VT at  -310 ms  Duration: 2.5 hrs  Therapies: Burst 1 Seq 1 - 3 -> Defib 36.1J ->  Defib 36.1J ->  Defib 36.1J ->  Defib 36.1J ->  Defib 36.1J ->  Defib 36.1J -> EGM Recording ended.     Assessment/Plan:     * Torsades de pointes  - underwent sedation + cardioversion in ER  - likely exacerbated due to acute decompensated heart failure; will treat underlying etiology  - continue IV amiodarone gtt till load completes, then PO amio 400mg x 2 weeks followed by 400mg daily  - bedside interrogation performed. See report above.   - EP consulted for further adjustment of ICD settings and battery replacement  - routine labs; goal K > 4, Mg > 2    Acute on chronic combined systolic and diastolic congestive heart failure  Dilated cardiomyopathy  - on last TTE 11/8/19: EF 10%, IV septum midline. AV does not open  - repeat TTE ordered; will follow up  results  - presents with elevated BNP (522), JVD, PND  - s/p IV lasix 100mg followed by lasix gtt 10 mg/hr  - strict in & out  - daily standing weights  - cardiac diet, fluid restrict    Presence of left ventricular assist device (LVAD)  Procedure: Device Interrogation Including analysis of device parameters  Current Settings: Ventricular Assist Device  Review of device function is stable/unstable stable  TXP LVAD INTERROGATIONS 1/12/2020 1/12/2020 1/11/2020 12/5/2019 10/25/2019 10/18/2019 10/18/2019   Type HeartMate II - HeartMate II - HeartMate II HeartMate II HeartMate3   Flow 5.0 5.5 - - 6.4 5.1 5.1   Speed 22542 [id number] - 27719 41479 [id number]   PI 3.5 2.8 1.3 - 4.1 3.9 4.1   Power (Jackson) 6.5 6.7 5.7 - 7.2 6.5 6.6   LSL 9600 - - - - - -   Pulsatility - - - No Pulse - - -   On chronic anticoagulation with warfarin. Supratherapeutic INR (5.3) on admission. Hold warfarin at this time. Goal INR 2.0 - 3.0  - daily BMP, Mg, Phos, LDH, INR; BNP, hepatic function panel, CRP M/W/F    Chronic Kidney Disease    - eGFR 40, baseline Cr likely 1.7-1.9. At baseline  - continue to monitor UOP  - renally dose meds  - avoid nephrotoxins    Gout   - continue home allopurinol     Caroline Vasquez MD  Heart Transplant  [facility]

## 2020-01-12 NOTE — ASSESSMENT & PLAN NOTE
HMII 9800  INR goal: 2-3   NO Bridge with Heparin  INR 5.3, hold warfarin and check inr daily resume when close to 3 to prevent dipping below 2  No active bleeding, no need for reversal

## 2020-01-12 NOTE — ED NOTES
"Tim Richards, a 54 y.o. male presents to the ED as transfer from [facility] for eval of supposed "stable v-tach". Pt has LVAD and AICD, which fired x7 PTA. Pt arrives with amiodarone infusing. Pt endorses CP, SOB, dizziness. Pt is diaphoretic upon arrival. Pt states EMS gave him morphine in route.   Triage note:  Chief Complaint   Patient presents with    [facility] Transfer- AICD Firing     Patient transferred from [facility] for evaluation after AICD fired 7 times. Per [facility] patient in "Stable VTach". Patient arrives on Amiodarone drip. Patient reports shortness of breath.     Pacemaker Problem     Pt's spouse reports pt's pacemaker being shocked 7x today. Last time 5 minutes ago. Pt had cp PTA. +SOB, dizziness     Review of patient's allergies indicates:   Allergen Reactions    Lisinopril Anaphylaxis     Past Medical History:   Diagnosis Date    CHF (congestive heart failure)     Dilated cardiomyopathy 1/10/2018    Disorder of kidney and ureter     CKD    Gout     HTN (hypertension)     Ventricular tachycardia (paroxysmal)      "

## 2020-01-12 NOTE — ED PROVIDER NOTES
"Encounter Date: 1/11/2020       History     Chief Complaint   Patient presents with    [facility] Transfer- AICD Firing     Patient transferred from [facility] for evaluation after AICD fired 7 times. Per [facility] patient in "Stable VTach". Patient arrives on Amiodarone drip. Patient reports shortness of breath.     Pacemaker Problem     Pt's spouse reports pt's pacemaker being shocked 7x today. Last time 5 minutes ago. Pt had cp PTA. +SOB, dizziness     53 yo male with LVAD presents via wife with pacemaker's firing 1 time yesterday and 6 times in rapid succession this evening, shortly PTA. Patient states he exerted himself a little yesterday, but otherwise had no change to activity. No diet or medication changes. Tonight he ate supper and was sitting watching television when his device started to fire. Patient at present has discomfort to L upper chest, shortness of breath, dizziness, and thirst. He denies lower extremity swelling.     PMH: CHF, dilated cardiomyopathy, paroxysmal v tech, CKD, HTN, gout    PSH: LVAD, electrophysiology, cardiac cath, cardioversion, defibrillation, EGD/colonoscopy        Review of patient's allergies indicates:   Allergen Reactions    Lisinopril Anaphylaxis     Past Medical History:   Diagnosis Date    CHF (congestive heart failure)     Dilated cardiomyopathy 1/10/2018    Disorder of kidney and ureter     CKD    Gout     HTN (hypertension)     Ventricular tachycardia (paroxysmal)      Past Surgical History:   Procedure Laterality Date    CARDIAC CATHETERIZATION  Dec. 2012    CARDIAC DEFIBRILLATOR PLACEMENT Left     CRRT-D    COLONOSCOPY N/A 3/6/2018    Procedure: COLONOSCOPY;  Surgeon: Alonso Bone MD;  Location: [facility] ([address] [place] [address]);  Service: Endoscopy;  Laterality: N/A;    COLONOSCOPY N/A 7/17/2019    Procedure: COLONOSCOPY;  Surgeon: Blane Valdez MD;  Location: [facility] ENDO ([address] [place] [address]);  Service: Endoscopy;  Laterality: N/A;    COLONOSCOPY N/A 7/18/2019    Procedure: " COLONOSCOPY;  Surgeon: Blane Valdez MD;  [facility]  Service: Endoscopy;  Laterality: N/A;    ESOPHAGOGASTRODUODENOSCOPY N/A 2019    Procedure: EGD (ESOPHAGOGASTRODUODENOSCOPY);  Surgeon: Blane Valdez MD;  [facility]: [facility] ([facility]ND FLR);  Service: Endoscopy;  Laterality: N/A;    ESOPHAGOGASTRODUODENOSCOPY N/A 2019    Procedure: EGD (ESOPHAGOGASTRODUODENOSCOPY);  Surgeon: Blane Valdez MD;  [facility]  Service: Endoscopy;  Laterality: N/A;    NONINVASIVE CARDIAC ELECTROPHYSIOLOGY STUDY N/A 10/18/2019    Procedure: CARDIAC ELECTROPHYSIOLOGY STUDY, NONINVASIVE;  Surgeon: Raz Wagner MD;  Location: [facility] EP LAB;  Service: Cardiology;  Laterality: N/A;  VT, DFTs, MDT CRTD in situ, LVAD, anes, MB, 3098    TREATMENT OF CARDIAC ARRHYTHMIA  10/18/2019    Procedure: Cardioversion or Defibrillation;  Surgeon: Raz Wagner MD;  Location: [facility] EP LAB;  Service: Cardiology;;     Family History   Problem Relation Age of Onset    Hypertension Father     Diabetes Father     Coronary artery disease Father     Heart disease Father         CHF    No Known Problems Mother     Cancer Sister 54        breast CA    No Known Problems Brother      Social History     Tobacco Use    Smoking status: Former Smoker     Packs/day: 1.00     Years: 31.00     Pack years: 31.00     Types: Cigarettes     Last attempt to quit: 2018     Years since quittin.9    Smokeless tobacco: Never Used    Tobacco comment: pt is quiting on his own - pt stated not qualified for program;  pt  quit on his own   Substance Use Topics    Alcohol use: No     Alcohol/week: 0.0 standard drinks     Comment: one fifth of gin or rum/week    Drug use: No     Review of Systems   Constitutional: Negative for appetite change.   HENT: Negative for rhinorrhea and sore throat.    Eyes: Negative for visual disturbance.   Respiratory: Positive for shortness of breath. Negative for cough.     Cardiovascular: Positive for chest pain.   Gastrointestinal: Negative for vomiting.   Genitourinary: Negative for dysuria.   Musculoskeletal: Negative for neck stiffness.   Skin: Negative for rash.   Neurological: Positive for dizziness. Negative for syncope.   Hematological: Bruises/bleeds easily (on coumadin).       Physical Exam     Initial Vitals [01/11/20 2200]   BP Pulse Resp Temp SpO2   -- -- 20 98 °F (36.7 °C) --      MAP       --         Physical Exam    Nursing note and vitals reviewed.  Constitutional: He appears well-developed and well-nourished.   Awake, alert middle-aged male. Speaking in complete sentences. Appears somewhat uncomfortable.   HENT:   Head: Normocephalic and atraumatic.   Mouth/Throat: Oropharynx is clear and moist.   Eyes: Conjunctivae and EOM are normal. Pupils are equal, round, and reactive to light.   Neck: Normal range of motion. Neck supple.   Cardiovascular:   LVAD mechanical whine.   Pulmonary/Chest: He has no wheezes. He has no rhonchi. He has no rales.   Vest containing batteries. Well-healed sternotomy scar.   Abdominal: Soft. There is no tenderness.   LVAD device RLQ.    Musculoskeletal: Normal range of motion. He exhibits edema (trace to shins). He exhibits no tenderness.   Neurological: He is alert and oriented to person, place, and time. He has normal strength.   Moving all extremities   Skin: Skin is warm and dry.   Psychiatric: He has a normal mood and affect.         ED Course   Critical Care  Date/Time: 1/11/2020 9:50 PM  Performed by: Priya Cuba MD  Authorized by: Cassy Beckett MD   Total critical care time (exclusive of procedural time) : 35 minutes        Labs Reviewed   CBC W/ AUTO DIFFERENTIAL - Abnormal; Notable for the following components:       Result Value    Mean Corpuscular Hemoglobin 25.5 (*)     Mean Corpuscular Hemoglobin Conc 29.9 (*)     RDW 14.6 (*)     Gran% 76.0 (*)     All other components within normal limits   COMPREHENSIVE  METABOLIC PANEL - Abnormal; Notable for the following components:    Potassium 3.4 (*)     Glucose 228 (*)     BUN, Bld 24 (*)     Creatinine 1.9 (*)     Anion Gap 17 (*)     eGFR if  46 (*)     eGFR if non  39 (*)     All other components within normal limits   PROTIME-INR - Abnormal; Notable for the following components:    Prothrombin Time 55.8 (*)     INR 5.3 (*)     All other components within normal limits    Narrative:      INR  critical result(s) called and verbal readback obtained from   Vidhi Oliva by CK 01/11/2020 22:25   TROPONIN I - Abnormal; Notable for the following components:    Troponin I 0.068 (*)     All other components within normal limits   B-TYPE NATRIURETIC PEPTIDE - Abnormal; Notable for the following components:     (*)     All other components within normal limits   APTT - Abnormal; Notable for the following components:    aPTT 59.0 (*)     All other components within normal limits   TSH     EKG Readings: (Independently Interpreted)   21:29: vtach, .        Imaging Results    None          Medical Decision Making:   History:   Old Medical Records: I decided to obtain old medical records.  Old Records Summarized: records from previous admission(s).  Initial Assessment:   54 y.o. Male with dilated cardiomyopathy, EF 10%, LVAD in place, presents s/p being shocked by pacemaker 1 time yesterday and 6 times this evening just PTA.   Differential Diagnosis:   Ddx includes dysrhythmia, device malfunction, occult infection, other.  Clinical Tests:   Lab Tests: Ordered and Reviewed  Medical Tests: Reviewed and Ordered  ED Management:  EKG and telemetry monitoring show vtach. However patient is awake and alert.    I discussed patient via transfer center with Dr. Betyte Connors MD, on call for LVAD. He recommended starting amiodarone and agreed to accept patient for transer.    Patient and wife updated. Patient will be ED to ED transfer via EMS. He  remains awake and alert. He is somewhat more comfortable on morphine.                         Patient Condition: Patient stabilized  Reason for Transfer: Qualified clinical personnel or service unavailable, Hospital resources not available, MD request  Accepting Physician: Waylon Smith MD: Rosa Elena        Clinical Impression:       ICD-10-CM ICD-9-CM   1. Ventricular tachycardia I47.2 427.1   2. Chest pain R07.9 786.50   3. LVAD (left ventricular assist device) present Z95.811 V43.21           Priya Cuba MD  01/11/20 2491

## 2020-01-13 ENCOUNTER — DOCUMENTATION ONLY (OUTPATIENT)
Dept: CARDIOLOGY | Facility: HOSPITAL | Age: 55
End: 2020-01-13

## 2020-01-13 PROBLEM — E05.90 HYPERTHYROIDISM: Status: ACTIVE | Noted: 2020-01-13

## 2020-01-13 LAB
ALBUMIN SERPL BCP-MCNC: 3.4 G/DL (ref 3.5–5.2)
ALP SERPL-CCNC: 133 U/L (ref 55–135)
ALT SERPL W/O P-5'-P-CCNC: 47 U/L (ref 10–44)
ANION GAP SERPL CALC-SCNC: 11 MMOL/L (ref 8–16)
APTT BLDCRRT: 42.5 SEC (ref 21–32)
AST SERPL-CCNC: 34 U/L (ref 10–40)
BASOPHILS # BLD AUTO: 0.01 K/UL (ref 0–0.2)
BASOPHILS NFR BLD: 0.1 % (ref 0–1.9)
BILIRUB DIRECT SERPL-MCNC: 0.2 MG/DL (ref 0.1–0.3)
BILIRUB SERPL-MCNC: 0.4 MG/DL (ref 0.1–1)
BNP SERPL-MCNC: 1032 PG/ML (ref 0–99)
BUN SERPL-MCNC: 22 MG/DL (ref 6–20)
CALCIUM SERPL-MCNC: 9.1 MG/DL (ref 8.7–10.5)
CHLORIDE SERPL-SCNC: 100 MMOL/L (ref 95–110)
CO2 SERPL-SCNC: 28 MMOL/L (ref 23–29)
CREAT SERPL-MCNC: 1.5 MG/DL (ref 0.5–1.4)
CRP SERPL-MCNC: 11.8 MG/L (ref 0–8.2)
DIFFERENTIAL METHOD: ABNORMAL
EOSINOPHIL # BLD AUTO: 0 K/UL (ref 0–0.5)
EOSINOPHIL NFR BLD: 0.1 % (ref 0–8)
ERYTHROCYTE [DISTWIDTH] IN BLOOD BY AUTOMATED COUNT: 14.7 % (ref 11.5–14.5)
EST. GFR  (AFRICAN AMERICAN): >60 ML/MIN/1.73 M^2
EST. GFR  (NON AFRICAN AMERICAN): 52.4 ML/MIN/1.73 M^2
GLUCOSE SERPL-MCNC: 102 MG/DL (ref 70–110)
HCT VFR BLD AUTO: 42.5 % (ref 40–54)
HGB BLD-MCNC: 12.5 G/DL (ref 14–18)
IMM GRANULOCYTES # BLD AUTO: 0.05 K/UL (ref 0–0.04)
IMM GRANULOCYTES NFR BLD AUTO: 0.6 % (ref 0–0.5)
INR PPP: 5.2 (ref 0.8–1.2)
LDH SERPL L TO P-CCNC: 327 U/L (ref 110–260)
LYMPHOCYTES # BLD AUTO: 0.8 K/UL (ref 1–4.8)
LYMPHOCYTES NFR BLD: 10.4 % (ref 18–48)
MAGNESIUM SERPL-MCNC: 1.9 MG/DL (ref 1.6–2.6)
MAGNESIUM SERPL-MCNC: 2 MG/DL (ref 1.6–2.6)
MCH RBC QN AUTO: 25.5 PG (ref 27–31)
MCHC RBC AUTO-ENTMCNC: 29.4 G/DL (ref 32–36)
MCV RBC AUTO: 87 FL (ref 82–98)
MONOCYTES # BLD AUTO: 0.9 K/UL (ref 0.3–1)
MONOCYTES NFR BLD: 10.8 % (ref 4–15)
NEUTROPHILS # BLD AUTO: 6.2 K/UL (ref 1.8–7.7)
NEUTROPHILS NFR BLD: 78 % (ref 38–73)
NRBC BLD-RTO: 0 /100 WBC
PHOSPHATE SERPL-MCNC: 4 MG/DL (ref 2.7–4.5)
PLATELET # BLD AUTO: 238 K/UL (ref 150–350)
PMV BLD AUTO: 10 FL (ref 9.2–12.9)
POTASSIUM SERPL-SCNC: 3.8 MMOL/L (ref 3.5–5.1)
POTASSIUM SERPL-SCNC: 4.7 MMOL/L (ref 3.5–5.1)
PREALB SERPL-MCNC: 24 MG/DL (ref 20–43)
PROT SERPL-MCNC: 6.9 G/DL (ref 6–8.4)
PROTHROMBIN TIME: 49.7 SEC (ref 9–12.5)
RBC # BLD AUTO: 4.91 M/UL (ref 4.6–6.2)
SODIUM SERPL-SCNC: 139 MMOL/L (ref 136–145)
WBC # BLD AUTO: 8 K/UL (ref 3.9–12.7)

## 2020-01-13 PROCEDURE — 93750 INTERROGATION VAD IN PERSON: CPT | Mod: ,,, | Performed by: INTERNAL MEDICINE

## 2020-01-13 PROCEDURE — 80048 BASIC METABOLIC PNL TOTAL CA: CPT

## 2020-01-13 PROCEDURE — 63600175 PHARM REV CODE 636 W HCPCS: Performed by: INTERNAL MEDICINE

## 2020-01-13 PROCEDURE — 99233 SBSQ HOSP IP/OBS HIGH 50: CPT | Mod: ,,, | Performed by: INTERNAL MEDICINE

## 2020-01-13 PROCEDURE — 84445 ASSAY OF TSI GLOBULIN: CPT

## 2020-01-13 PROCEDURE — 84100 ASSAY OF PHOSPHORUS: CPT

## 2020-01-13 PROCEDURE — 27000248 HC VAD-ADDITIONAL DAY

## 2020-01-13 PROCEDURE — 94761 N-INVAS EAR/PLS OXIMETRY MLT: CPT

## 2020-01-13 PROCEDURE — 85610 PROTHROMBIN TIME: CPT

## 2020-01-13 PROCEDURE — 80076 HEPATIC FUNCTION PANEL: CPT

## 2020-01-13 PROCEDURE — 85730 THROMBOPLASTIN TIME PARTIAL: CPT

## 2020-01-13 PROCEDURE — 99232 SBSQ HOSP IP/OBS MODERATE 35: CPT | Mod: ,,, | Performed by: INTERNAL MEDICINE

## 2020-01-13 PROCEDURE — 25000003 PHARM REV CODE 250: Performed by: INTERNAL MEDICINE

## 2020-01-13 PROCEDURE — 99233 PR SUBSEQUENT HOSPITAL CARE,LEVL III: ICD-10-PCS | Mod: ,,, | Performed by: INTERNAL MEDICINE

## 2020-01-13 PROCEDURE — 80048 BASIC METABOLIC PNL TOTAL CA: CPT | Mod: 91

## 2020-01-13 PROCEDURE — 83880 ASSAY OF NATRIURETIC PEPTIDE: CPT

## 2020-01-13 PROCEDURE — 85025 COMPLETE CBC W/AUTO DIFF WBC: CPT

## 2020-01-13 PROCEDURE — 27000685 HC LVAD KIT (30 DAY SUPPLY)

## 2020-01-13 PROCEDURE — 99232 PR SUBSEQUENT HOSPITAL CARE,LEVL II: ICD-10-PCS | Mod: ,,, | Performed by: INTERNAL MEDICINE

## 2020-01-13 PROCEDURE — 83735 ASSAY OF MAGNESIUM: CPT | Mod: 91

## 2020-01-13 PROCEDURE — 20000000 HC ICU ROOM

## 2020-01-13 PROCEDURE — 25000003 PHARM REV CODE 250: Performed by: GENERAL ACUTE CARE HOSPITAL

## 2020-01-13 PROCEDURE — 93750 PR INTERROGATE VENT ASSIST DEV, IN PERSON, W PHYSICIAN ANALYSIS: ICD-10-PCS | Mod: ,,, | Performed by: INTERNAL MEDICINE

## 2020-01-13 PROCEDURE — 83520 IMMUNOASSAY QUANT NOS NONAB: CPT

## 2020-01-13 PROCEDURE — 86140 C-REACTIVE PROTEIN: CPT

## 2020-01-13 PROCEDURE — 84132 ASSAY OF SERUM POTASSIUM: CPT

## 2020-01-13 PROCEDURE — 25000003 PHARM REV CODE 250: Performed by: STUDENT IN AN ORGANIZED HEALTH CARE EDUCATION/TRAINING PROGRAM

## 2020-01-13 PROCEDURE — 84134 ASSAY OF PREALBUMIN: CPT

## 2020-01-13 PROCEDURE — 83735 ASSAY OF MAGNESIUM: CPT

## 2020-01-13 PROCEDURE — 63600175 PHARM REV CODE 636 W HCPCS: Performed by: STUDENT IN AN ORGANIZED HEALTH CARE EDUCATION/TRAINING PROGRAM

## 2020-01-13 PROCEDURE — 83615 LACTATE (LD) (LDH) ENZYME: CPT

## 2020-01-13 RX ORDER — POTASSIUM CHLORIDE 20 MEQ/1
40 TABLET, EXTENDED RELEASE ORAL ONCE
Status: COMPLETED | OUTPATIENT
Start: 2020-01-13 | End: 2020-01-13

## 2020-01-13 RX ORDER — FUROSEMIDE 10 MG/ML
80 INJECTION INTRAMUSCULAR; INTRAVENOUS ONCE
Status: COMPLETED | OUTPATIENT
Start: 2020-01-13 | End: 2020-01-13

## 2020-01-13 RX ORDER — METHIMAZOLE 10 MG/1
10 TABLET ORAL 2 TIMES DAILY
Status: DISCONTINUED | OUTPATIENT
Start: 2020-01-13 | End: 2020-01-13

## 2020-01-13 RX ORDER — METHIMAZOLE 10 MG/1
20 TABLET ORAL 2 TIMES DAILY
Status: DISCONTINUED | OUTPATIENT
Start: 2020-01-13 | End: 2020-01-17

## 2020-01-13 RX ADMIN — PANTOPRAZOLE SODIUM 40 MG: 40 TABLET, DELAYED RELEASE ORAL at 09:01

## 2020-01-13 RX ADMIN — POTASSIUM CHLORIDE 40 MEQ: 1500 TABLET, EXTENDED RELEASE ORAL at 07:01

## 2020-01-13 RX ADMIN — DOXAZOSIN 8 MG: 4 TABLET ORAL at 09:01

## 2020-01-13 RX ADMIN — AMLODIPINE BESYLATE 10 MG: 10 TABLET ORAL at 02:01

## 2020-01-13 RX ADMIN — ALLOPURINOL 100 MG: 100 TABLET ORAL at 09:01

## 2020-01-13 RX ADMIN — Medication 400 MG: at 09:01

## 2020-01-13 RX ADMIN — Medication 400 MG: at 02:01

## 2020-01-13 RX ADMIN — AMIODARONE HYDROCHLORIDE 1 MG/MIN: 1.8 INJECTION, SOLUTION INTRAVENOUS at 09:01

## 2020-01-13 RX ADMIN — METHIMAZOLE 20 MG: 10 TABLET ORAL at 10:01

## 2020-01-13 RX ADMIN — AMIODARONE HYDROCHLORIDE 1 MG/MIN: 1.8 INJECTION, SOLUTION INTRAVENOUS at 03:01

## 2020-01-13 RX ADMIN — AMIODARONE HYDROCHLORIDE 1 MG/MIN: 1.8 INJECTION, SOLUTION INTRAVENOUS at 02:01

## 2020-01-13 RX ADMIN — FERROUS GLUCONATE TAB 324 MG (37.5 MG ELEMENTAL IRON) 324 MG: 324 (37.5 FE) TAB at 08:01

## 2020-01-13 RX ADMIN — FUROSEMIDE 20 MG/HR: 10 INJECTION, SOLUTION INTRAMUSCULAR; INTRAVENOUS at 02:01

## 2020-01-13 RX ADMIN — PREDNISONE 60 MG: 20 TABLET ORAL at 09:01

## 2020-01-13 RX ADMIN — ALPRAZOLAM 0.25 MG: 0.25 TABLET ORAL at 12:01

## 2020-01-13 RX ADMIN — METHIMAZOLE 10 MG: 10 TABLET ORAL at 12:01

## 2020-01-13 RX ADMIN — FUROSEMIDE 20 MG/HR: 10 INJECTION, SOLUTION INTRAMUSCULAR; INTRAVENOUS at 03:01

## 2020-01-13 RX ADMIN — FUROSEMIDE 80 MG: 10 INJECTION, SOLUTION INTRAMUSCULAR; INTRAVENOUS at 08:01

## 2020-01-13 NOTE — ASSESSMENT & PLAN NOTE
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 and Negative Ab   -TRAb (-)   -Pt clinically Hyperthyroid (Arrytmias,  Diaphoresis and heat intolerance)   -No clinical evidence of Thyroid storm   -TSH < 0.01,   FT4 3.9  (Hyperthyroid likely 2/2 prednisone non compliance x 1 week)     Plan:   -Restart Prednisone 60mg PO daily   -Increase Methimazole to 20mg BID   -Send for TSI and TPO Ab   -Send for Thyroid US (It will help identify thyroiditis vs Underlying thyroid disease)   -Repeat TFT daily to establish trend       -Primary team concerned for Fluid overload 2/2 steroids (At this time patient does Not have evidence of fluid overload) At time of admission BNP Elevated likely 2/2 Vtach and decreased contractility    -Due to Type 2 thyroiditis (Treatment is limited to Steroids for antiinflammatory effects on the thyroid gland)   If TFT fail to improve on prednisone and primary team is concerned for fluid overload at that time can consider total thyroidectomy)

## 2020-01-13 NOTE — PROGRESS NOTES
[facility]  Heart Transplant  Progress Note    Patient Name: Tim Richards  MRN: [id number]  Admission Date: 1/11/2020  Hospital Length of Stay: 2 days  Attending Physician: Bettye Connors MD  Primary Care Provider: Primary Doctor No  Principal Problem:<principal problem not specified>    Subjective:     Interval History: Patient feels well today. Last night patient developed a WCT that was determined to be ST with pacing vs pacemaker tachycardia, no VT. ICD adjusted. Only net neg 766, furosemide increased to 20 mg/hr.     Continuous Infusions:   amiodarone in dextrose 5% 1 mg/min (01/13/20 1500)    furosemide (LASIX) 2 mg/mL infusion (non-titrating) 20 mg/hr (01/13/20 1500)     Scheduled Meds:   allopurinol  100 mg Oral Daily    amLODIPine  10 mg Oral Daily    doxazosin  8 mg Oral Q12H    ferrous gluconate  324 mg Oral Daily with breakfast    magnesium oxide  400 mg Oral TID    methIMAzole  20 mg Oral BID    pantoprazole  40 mg Oral Daily    predniSONE  60 mg Oral Daily     PRN Meds:acetaminophen, ondansetron, oxyCODONE    Review of patient's allergies indicates:   Allergen Reactions    Lisinopril Anaphylaxis     Objective:     Vital Signs (Most Recent):  Temp: 98.1 °F (36.7 °C) (01/13/20 1500)  Pulse: 74 (01/13/20 1500)  Resp: 20 (01/13/20 1500)  BP: (!) 84/0 (01/13/20 1500)  SpO2: 96 % (01/13/20 1500) Vital Signs (24h Range):  Temp:  [97.5 °F (36.4 °C)-98.2 °F (36.8 °C)] 98.1 °F (36.7 °C)  Pulse:  [] 74  Resp:  [17-28] 20  SpO2:  [94 %-100 %] 96 %  BP: ()/(0-110) 84/0     Patient Vitals for the past 72 hrs (Last 3 readings):   Weight   01/13/20 0500 116.1 kg (256 lb 0.7 oz)   01/12/20 0400 121.5 kg (267 lb 13.7 oz)   01/11/20 2246 124.7 kg (274 lb 14.6 oz)     Body mass index is 33.78 kg/m².      Intake/Output Summary (Last 24 hours) at 1/13/2020 1519  Last data filed at 1/13/2020 1500  Gross per 24 hour   Intake 2259.08 ml   Output 4500 ml   Net -2240.92 ml        Physical Exam   Constitutional: He is oriented to person, place, and time. He appears well-developed.   Neck: JVD (13 cm H2O at 30 degrees) present.   Cardiovascular: Normal rate.   Murmur (VAD) heard.  Pulmonary/Chest: Effort normal and breath sounds normal.   Abdominal: Soft. Bowel sounds are normal.   Musculoskeletal: Normal range of motion. He exhibits no edema.   Neurological: He is alert and oriented to person, place, and time. No cranial nerve deficit.   Skin: Skin is warm and dry. Capillary refill takes less than 2 seconds.       Significant Labs:  CBC:  Recent Labs   Lab 01/11/20 2135 01/12/20 0311 01/13/20 0318   WBC 7.71 9.59 8.00   RBC 5.48 4.87 4.91   HGB 14.0 12.7* 12.5*   HCT 46.8 42.1 42.5    228 238   MCV 85 86 87   MCH 25.5* 26.1* 25.5*   MCHC 29.9* 30.2* 29.4*     BNP:  Recent Labs   Lab 01/11/20 2135 01/13/20 0318   * 1,032*     CMP:  Recent Labs   Lab 01/11/20 2135 01/12/20 0311 01/12/20 2002 01/13/20 0318 01/13/20  1245   * 125* 184* 102  --    CALCIUM 9.9 9.4 9.6 9.1  --    ALBUMIN 3.9  --   --  3.4*  --    PROT 7.7  --   --  6.9  --     138 138 139  --    K 3.4* 3.3* 4.3 3.8 4.7   CO2 24 27 29 28  --    CL 97 99 100 100  --    BUN 24* 25* 22* 22*  --    CREATININE 1.9* 1.9* 1.7* 1.5*  --    ALKPHOS 130  --   --  133  --    ALT 40  --   --  47*  --    AST 25  --   --  34  --    BILITOT 0.4  --   --  0.4  --       Coagulation:   Recent Labs   Lab 01/11/20 2135 01/12/20 0230 01/12/20 0311 01/13/20 0318   INR 5.3* 5.1* 5.0* 5.2*   APTT 59.0*  --  43.4* 42.5*     LDH:  Recent Labs   Lab 01/12/20  0311 01/13/20  0318   * 327*     Microbiology:  Microbiology Results (last 7 days)     ** No results found for the last 168 hours. **          I have reviewed all pertinent labs within the past 24 hours.    Estimated Creatinine Clearance: 76 mL/min (A) (based on SCr of 1.5 mg/dL (H)).    Diagnostic Results:  I have reviewed all pertinent imaging  results/findings within the past 24 hours.    Assessment and Plan:     Tim Richards is a 54 y.o. M with DCM,(EF 10%, grade III DD) s/p HM II with Outflow graft changed (03/9/18) as DT, BiV-Icd [employer] (2014), HtN, obesity transferred from  for VT now in VF, shocked by device x 7.  Has been taking prednisone for hyperthyroidism which has caused him to eat/retain fluid and gain 13lbs as of late.  On exam he is decompensated, k 3.4,  higher than prior, creat 1.9 around baseline, INR 5.3.     He was admitted to the hospital in 10/2019 with similar situation 2/2 ICD shocks. MMVT -- multiple rounds of ATP--degenerated the MMVT into VF and the he was unsuccessfully shocked multiple times . He was eventually shocked externally to convert into sinus rhythm .  It was postulated reasons for ineffective therapy (had worsening hypervolemia week prior, length of time in the arrhythmia prior to ICD shock, IV amiodarone causing higher defibrillatory tissue threshold, or change in heart-shock vector post LVAD implant). He had a NIPS which showed successful DFT at 35J. He was discharge after being diuresed. Device check noted on 12/14/19 - one episode of MMVT 270 ms that was appropriately successfully shocked with multiple episodes of non sustained slower VT. Of note he had a monitoring zone added during his admission at 133. VT 1 zone at 167 -- 6 rounds of ATP added before shock in that zone.      TTE demonstrated EF >10%, AV does not open, IVF dilated 2.5cm with respiratory variation >50%, Dilated RV / LV.    Hyperthyroidism  - Likely due to amiodarone per endocrinology.  - Check US thyroid, TSI and TPO ab per endo recs  - Continue prednisone 60 mg, restarted on methimazole 60 mg BID    Presence of left ventricular assist device (LVAD)  Anticoagulation - warfarin on hold due to supra therapeutic INR  Procedure: Device Interrogation Including analysis of device parameters  Current Settings: Ventricular Assist  Device  Review of device function is stable/unstable stable    TXP LVAD INTERROGATIONS 1/13/2020 1/13/2020 1/13/2020 1/13/2020 1/13/2020 1/13/2020 1/13/2020   Type HeartMate II HeartMate II HeartMate II HeartMate II HeartMate II HeartMate II HeartMate II   Flow 5.9 6 6.2 5.3 5.6 6 5.7   Speed 50058 79700 14685 76872 55025 63650 55227   PI 3 3 3 3.2 3.5 3.4 3.5   Power (Jackson) 7 7.1 7.1 6.9 6.8 7 6.9   LSL 9600 9600 9600 9600 9600 9600 9600   Pulsatility - - - - - - -       Ventricular tachycardia  - Underwent sedation +external shock in ER  - Likely exacerbated due to acute decompensated heart failure; will treat underlying etiology  - Continue IV amiodarone for now at 1 mg/min  - EP on board, ICD setting adjusted today.   - Plan for gen exchange and possible additional lead placement, will need to discuss timing    Hypertension  - Continue amlodipine and doxazosin     Acute on chronic combined systolic and diastolic congestive heart failure  Dilated cardiomyopathy  See LVAD        Sanya Zavala MD  Heart Transplant  [facility]

## 2020-01-13 NOTE — PROGRESS NOTES
Admit Note     Met with patient to assess needs. Patient is a 54 y.o.  male, admitted for [place], congestive heart failure, chronic kidney dz, gout and LVAD.  Pt received his LVAD on 3/8/18. The pt does his own dressing change.       Patient admitted from emergency on 1/11/2020 .  At this time, patient presents as alert and oriented x 4, good eye contact, calm and communicative.  At this time, patients caregiver is not currently in attendance.     Household/Family Systems     Patient resides with patient's wife and step son (age 30) , at     [address]  [place] [address].      Support system includes spouse and adult step son.     Patient does not have dependents that are need of being cared for.   The pt reports his spouse is his primary caregiver for transplant and his step son is his secondary caregiver. Pt reports they may change their secondary caregiver, but at this time no change is being made. Pt does report an understanding that  two caregivers are needed for transplant and the  Transplant Social Workers need to meet with caregivers  for completion of psychosocial.      Patients primary caregiver is self.   Pt's cell:  [phone]  Pt's work: [phone]  Kelly Richards (spouse) [phone]    During admission, patient's caregiver plans to stay at home.  Confirmed patient and patients caregivers do have access to reliable transportation.    Cognitive Status/Learning     Patient reports reading ability as college and states patient does not have difficulty with reading, writing, seeing, hearing, comprehension, learning and memory.  Pt wears glasses.   Patient reports patient learns best by reading.     Needed: No.   Highest education level: Attended College/Technical School    Vocation/Disability   .  Working for Income: yes  If yes, working activity level: Working Full Time  Patient reports he works in the [employer] department at a [employer].   Pt  reports need for a work excuse.       Adherence     Patient reports a medium level of adherence to patients health care regimen. Pt reports he has difficulty following his diet.    Adherence counseling and education provided. Patient verbalizes understanding.    Substance Use    Patient reports the following substance usage.    Tobacco: no current use. The pt quit smoking 2019.   Alcohol: no alcohol since 2018  Illicit Drugs/Non-prescribed Medications: none, patient denies any use.  Patient states clear understanding of the potential impact of substance use.  Substance abstinence/cessation counseling, education and resources provided and reviewed.     Services Utilizing/ADLS    Infusion Service: Prior to admission, patient utilizing? no  Home Health: Prior to admission, patient utilizing? no  DME: Prior to admission, yes cane, currently not in use  Pulmonary/Cardiac Rehab: Prior to admission, no  Dialysis:  Prior to admission, no  Transplant Specialty Pharmacy:  Prior to admission, no.    Prior to admission, patient reports patient was independent with ADLS and was driving.  Patient reports patient is not able to care for self at this time due to compromised medical condition (as documented in medical record) and physical weakness..  Patient indicates a willingness to care for self once medically cleared to do so.    Insurance/Medications    Insured by   Payor/Plan Subscr  Sex Relation Sub. Ins. ID Effective Group Num   1. AET[employer] - AETNA* JESUS PHELPS LELAND* 1965 Male  [id number]                                   [address]   2. [employer] - [employer]* JESUS PHELPS LELAND* 1965 Male  [id number]                                    [address]      Primary Insurance (for UNOS reporting): Private Insurance  Secondary Insurance (for UNOS reporting): None    Patient reports patient is able to obtain and afford medications at this time and at time of  discharge.    Living Will/Healthcare Power of     Patient states patient does not have a LW and/or HCPA.   provided education regarding LW and HCPA and the completion of forms.    Coping/Mental Health    Patient is coping adequately with the aid of  family members. Patient denies mental health difficulties.   Worker provided general support given hospital admission.     Discharge Planning    At time of discharge, patient plans to return to patient's home under the care of self and spouse.  Patients spouse will transport patient.  Per rounds today, expected discharge date has not been medically determined at this time. Patient and patients caregiver  verbalize understanding and are involved in treatment planning and discharge process.    Additional Concerns    Patient is being followed for needs, education, resources, information, emotional support, supportive counseling, and for supportive and skilled discharge plan of care.  providing ongoing psychosocial support, education, resources and d/c planning as needed.  SW remains available. Patient verbalizes understanding and agreement with information reviewed, social work availability, and how to access available resources as needed.

## 2020-01-13 NOTE — SUBJECTIVE & OBJECTIVE
Interval History: Patient feels well today. Last night patient developed a WCT that was determined to be ST with pacing vs pacemaker tachycardia, no VT. ICD adjusted. Only net neg 766, furosemide increased to 20 mg/hr.     Continuous Infusions:   amiodarone in dextrose 5% 1 mg/min (01/13/20 1500)    furosemide (LASIX) 2 mg/mL infusion (non-titrating) 20 mg/hr (01/13/20 1500)     Scheduled Meds:   allopurinol  100 mg Oral Daily    amLODIPine  10 mg Oral Daily    doxazosin  8 mg Oral Q12H    ferrous gluconate  324 mg Oral Daily with breakfast    magnesium oxide  400 mg Oral TID    methIMAzole  20 mg Oral BID    pantoprazole  40 mg Oral Daily    predniSONE  60 mg Oral Daily     PRN Meds:acetaminophen, ondansetron, oxyCODONE    Review of patient's allergies indicates:   Allergen Reactions    Lisinopril Anaphylaxis     Objective:     Vital Signs (Most Recent):  Temp: 98.1 °F (36.7 °C) (01/13/20 1500)  Pulse: 74 (01/13/20 1500)  Resp: 20 (01/13/20 1500)  BP: (!) 84/0 (01/13/20 1500)  SpO2: 96 % (01/13/20 1500) Vital Signs (24h Range):  Temp:  [97.5 °F (36.4 °C)-98.2 °F (36.8 °C)] 98.1 °F (36.7 °C)  Pulse:  [] 74  Resp:  [17-28] 20  SpO2:  [94 %-100 %] 96 %  BP: ()/(0-110) 84/0     Patient Vitals for the past 72 hrs (Last 3 readings):   Weight   01/13/20 0500 116.1 kg (256 lb 0.7 oz)   01/12/20 0400 121.5 kg (267 lb 13.7 oz)   01/11/20 2246 124.7 kg (274 lb 14.6 oz)     Body mass index is 33.78 kg/m².      Intake/Output Summary (Last 24 hours) at 1/13/2020 1519  Last data filed at 1/13/2020 1500  Gross per 24 hour   Intake 2259.08 ml   Output 4500 ml   Net -2240.92 ml       Physical Exam   Constitutional: He is oriented to person, place, and time. He appears well-developed.   Neck: JVD (13 cm H2O at 30 degrees) present.   Cardiovascular: Normal rate.   Murmur (VAD) heard.  Pulmonary/Chest: Effort normal and breath sounds normal.   Abdominal: Soft. Bowel sounds are normal.   Musculoskeletal: Normal  range of motion. He exhibits no edema.   Neurological: He is alert and oriented to person, place, and time. No cranial nerve deficit.   Skin: Skin is warm and dry. Capillary refill takes less than 2 seconds.       Significant Labs:  CBC:  Recent Labs   Lab 01/11/20 2135 01/12/20 0311 01/13/20 0318   WBC 7.71 9.59 8.00   RBC 5.48 4.87 4.91   HGB 14.0 12.7* 12.5*   HCT 46.8 42.1 42.5    228 238   MCV 85 86 87   MCH 25.5* 26.1* 25.5*   MCHC 29.9* 30.2* 29.4*     BNP:  Recent Labs   Lab 01/11/20 2135 01/13/20 0318   * 1,032*     CMP:  Recent Labs   Lab 01/11/20 2135 01/12/20 0311 01/12/20 2002 01/13/20 0318 01/13/20  1245   * 125* 184* 102  --    CALCIUM 9.9 9.4 9.6 9.1  --    ALBUMIN 3.9  --   --  3.4*  --    PROT 7.7  --   --  6.9  --     138 138 139  --    K 3.4* 3.3* 4.3 3.8 4.7   CO2 24 27 29 28  --    CL 97 99 100 100  --    BUN 24* 25* 22* 22*  --    CREATININE 1.9* 1.9* 1.7* 1.5*  --    ALKPHOS 130  --   --  133  --    ALT 40  --   --  47*  --    AST 25  --   --  34  --    BILITOT 0.4  --   --  0.4  --       Coagulation:   Recent Labs   Lab 01/11/20 2135 01/12/20 0230 01/12/20 0311 01/13/20 0318   INR 5.3* 5.1* 5.0* 5.2*   APTT 59.0*  --  43.4* 42.5*     LDH:  Recent Labs   Lab 01/12/20 0311 01/13/20 0318   * 327*     Microbiology:  Microbiology Results (last 7 days)     ** No results found for the last 168 hours. **          I have reviewed all pertinent labs within the past 24 hours.    Estimated Creatinine Clearance: 76 mL/min (A) (based on SCr of 1.5 mg/dL (H)).    Diagnostic Results:  I have reviewed all pertinent imaging results/findings within the past 24 hours.

## 2020-01-13 NOTE — HPI
55 YO Male w/ diagnosis of Amiodarone induced hyperthyroidism Type 2, HTN ,DCM,(EF 10%, grade III DD) s/p HM II with Outflow graft changed (03/9/18) as DT, BiV-Icd Medtr[employer] (2014) and obesity transferred from  for VT now in VF, shocked by device x 7 times. Pt was admitted to Cardiology unit.  During hospital course patient noted to have TSH < 0.01 and FT4 3.9.  Pt is being followed outpatient with endocrinology for Amiodarone induced hyperthyroidism Type 2 since 11/19.  Pt was started on Prednisone 60mg and Methimazole 20mg qam and 10mg qPM.  Pt has been having fluctuating TFT in the past due to prednisone non compliance.  Pt states he ran out of Prednisone x 1 week prior to admission due to running out of medications.  Pt endorses tremors, palpitations and diaphoresis since stopping Prednisone.  Pt was consulted with endocrinology for management of hyperthyroidism.

## 2020-01-13 NOTE — ASSESSMENT & PLAN NOTE
- Underwent sedation +external shock in ER  - Likely exacerbated due to acute decompensated heart failure; will treat underlying etiology  - Continue IV amiodarone for now at 1 mg/min  - EP on board, ICD setting adjusted today.   - Plan for gen exchange and possible additional lead placement, will need to discuss timing

## 2020-01-13 NOTE — CONSULTS
[facility]  Endocrinology  Consult Note    Consult Requested by: Bettye Connors MD   Reason for admit: Torsades de pointes    HISTORY OF PRESENT ILLNESS:  55 YO Male w/ diagnosis of Amiodarone induced hyperthyroidism Type 2, HTN ,DCM,(EF 10%, grade III DD) s/p HM II with Outflow graft changed (03/9/18) as DT, BiV-Icd Medtronic (2014) and obesity transferred from  for VT now in VF, shocked by device x 7 times.  Pt was admitted to Cardiology unit.  During hospital course patient noted to have TSH < 0.01 and FT4 3.9.  Pt is being followed outpatient with endocrinology for Amiodarone induced hyperthyroidism Type 2 since 11/19.  Pt was started on Prednisone 60mg and Methimazole 20mg qam and 10mg qPM.  Pt has been having fluctuating TFT in the past due to prednisone non compliance.  Pt states he ran out of Prednisone x 1 week prior to admission due to running out of medications.  Pt endorses tremors, palpitations and diaphoresis since stopping Prednisone.  Pt was consulted with endocrinology for management of hyperthyroidism.      Medications and/or Treatments Impacting Glycemic Control:  Immunotherapy:    Immunosuppressants     None        Steroids:   Hormones (From admission, onward)    Start     Stop Route Frequency Ordered    01/12/20 0900  predniSONE tablet 60 mg      -- Oral Daily 01/12/20 0209        Pressors:    Autonomic Drugs (From admission, onward)    None          Medications Prior to Admission   Medication Sig Dispense Refill Last Dose    allopurinol (ZYLOPRIM) 100 MG tablet TAKE 1 TABLET BY MOUTH EVERY DAY 30 tablet 5 1/11/2020    amiodarone (PACERONE) 400 MG tablet Take 1 tablet (400 mg total) by mouth once daily. 90 tablet 3 1/11/2020    amLODIPine (NORVASC) 10 MG tablet Take 1 tablet (10 mg total) by mouth once daily. 30 tablet 11 1/11/2020    doxazosin (CARDURA) 8 MG Tab Take 1 tablet (8 mg total) by mouth every 12 (twelve) hours. 60 tablet 11 1/11/2020    ferrous gluconate  (FERGON) 324 MG tablet Take 1 tablet (324 mg total) by mouth daily with breakfast. 90 tablet 6 1/11/2020    magnesium oxide (MAG-OX) 400 mg (241.3 mg magnesium) tablet TAKE 1 TABLET (400 MG TOTAL) BY MOUTH 3 (THREE) TIMES DAILY. 90 tablet 6 1/11/2020    methIMAzole (TAPAZOLE) 10 MG Tab TAKE 2 TABLETS BY MOUTH EVERY MORNING AND TAKE 1 TABLET EVERY EVENING 90 tablet 1 1/11/2020    pantoprazole (PROTONIX) 40 MG tablet TAKE 1 TABLET (40 MG TOTAL) BY MOUTH ONCE DAILY. 30 tablet 11 1/11/2020    potassium chloride (K-TAB) 20 mEq Take 20 mEq (1 tab) twice daily on M-W-F. Take 20 mEq (1 tab) once daily on Tu-Th-Sat-Geller 80 tablet 11 1/11/2020    predniSONE (DELTASONE) 20 MG tablet Take 3 tablets (60 mg total) by mouth once daily. 60 tablet 2 1/11/2020    spironolactone (ALDACTONE) 25 MG tablet Take 1 tablet (25 mg total) by mouth once daily. 30 tablet 11 Past Week    warfarin (COUMADIN) 5 MG tablet Take 1.5 tabs by mouth on 7/23 only, followed by weekly dose of 1 tablet daily, except 1.5 tabs on Mon, Wed, Fri 45 tablet 11 1/11/2020    mesalamine (PENTASA) 250 mg CpSR Take 4 capsules (1,000 mg total) by mouth 4 (four) times daily. 480 capsule 2 Taking    sildenafil (VIAGRA) 100 MG tablet Take 1 tablet (100 mg total) by mouth daily as needed for Erectile Dysfunction. 10 tablet 1 Taking    torsemide (DEMADEX) 20 MG Tab Take 40 mg (2 tabs) twice a day on M-W-F. Take 40 mg (2 tabs) once a day Gl-Jv-Iy-Sat 80 tablet 11 Taking       Current Facility-Administered Medications   Medication Dose Route Frequency Provider Last Rate Last Dose    acetaminophen tablet 650 mg  650 mg Oral Q6H PRN Caroline Vasquez MD        allopurinol tablet 100 mg  100 mg Oral Daily Caroline Vasquez MD   100 mg at 01/12/20 2112    amiodarone 360 mg/200 mL (1.8 mg/mL) infusion  1 mg/min Intravenous Continuous Caroline Vasquez MD 33.3 mL/hr at 01/13/20 1200 1 mg/min at 01/13/20 1200    amLODIPine tablet 10 mg  10 mg Oral Daily Caroline Vasquez MD   10 mg at  20 0825    doxazosin tablet 8 mg  8 mg Oral Q12H Caroline Vasquez MD   8 mg at 20 0928    ferrous gluconate tablet 324 mg  324 mg Oral Daily with breakfast Caroline Vasquez MD   324 mg at 20 0852    furosemide (LASIX) 2 mg/mL in sodium chloride 0.9% 100 mL infusion (conc: 2 mg/mL)  20 mg/hr Intravenous Continuous Sanya Zavala MD 10 mL/hr at 20 1200 20 mg/hr at 20 1200    lidocaine 2000 mg in dextrose 5% 250 mL infusion  1 mg/min Intravenous Continuous Mike Lehman MD 7.5 mL/hr at 20 1200 1 mg/min at 20 1200    magnesium oxide tablet 400 mg  400 mg Oral TID Caroline Vasquez MD   400 mg at 20 0929    methIMAzole tablet 20 mg  20 mg Oral BID Max Eli Chance MD        ondansetron disintegrating tablet 4 mg  4 mg Oral Q6H PRN Caroline Vasquez MD        oxyCODONE immediate release tablet 10 mg  10 mg Oral Q6H PRN Caroline Vasquez MD   10 mg at 20 1646    pantoprazole EC tablet 40 mg  40 mg Oral Daily Caroline Vasquez MD   40 mg at 20 0929    predniSONE tablet 60 mg  60 mg Oral Daily Caroline Vasquez MD   60 mg at 20 0928       Interval HPI:   Pt is AAOX3, HR 80s, Tmax 97.5,  Denies chest pain or SOB.    NO EVIDENCE OF THYROID STORM.    Overnight events:  NINA   Eatin%  Nausea: No  Hypoglycemia and intervention: No  Fever: No  TPN and/or TF: No    PMH, PSH, FH, SH updated and reviewed     ROS:     Review of Systems   Constitutional: Positive for diaphoresis. Negative for appetite change.   HENT: Negative for trouble swallowing.    Eyes: Negative for visual disturbance.   Respiratory: Negative for shortness of breath.    Cardiovascular: Negative for chest pain.   Gastrointestinal: Negative for constipation.   Endocrine: Positive for heat intolerance. Negative for cold intolerance.   Skin: Negative for rash.   Hematological: Negative for adenopathy.   Psychiatric/Behavioral: Negative for agitation.         PHYSICAL EXAMINATION:  Vitals:    20 1200    BP:    Pulse: 75   Resp: 20   Temp:      Body mass index is 33.78 kg/m².    Physical Exam   Constitutional: He appears well-developed.   HENT:   Right Ear: External ear normal.   Left Ear: External ear normal.   Nose: Nose normal.   Neck: No tracheal deviation present. No thyromegaly present.   Cardiovascular: Normal rate.   No murmur heard.  Pulmonary/Chest: Effort normal and breath sounds normal.   Abdominal: Soft. He exhibits no mass. No hernia.   Musculoskeletal: He exhibits no edema.   Neurological: He is alert. No cranial nerve deficit or sensory deficit. Coordination normal.   Skin: No rash noted.   Psychiatric: He has a normal mood and affect. Judgment normal.   Vitals reviewed.    .     ASSESSMENT and PLAN:    Ventricular fibrillation  -Managed by cardiology   -Pt with Amiodarone induced hyperthyroidism Type 2   (Unable to be off amiodarone due to repeated Vtach episodes)   -Will manage hyperthyroidism with Prednisone and Methimazole         Amiodarone-induced hyperthyroidism  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 and Negative Ab   -TRAb (-)   -Pt clinically Hyperthyroid (Arrytmias,  Diaphoresis and heat intolerance)   -No clinical evidence of Thyroid storm   -TSH < 0.01,   FT4 3.9  (Hyperthyroid likely 2/2 prednisone non compliance x 1 week)     Plan:   -Restart Prednisone 60mg PO daily   -Increase Methimazole to 20mg BID   -Send for TSI and TPO Ab   -Send for Thyroid US (It will help identify thyroiditis vs Underlying thyroid disease)   -Repeat TFT daily to establish trend       -Primary team concerned for Fluid overload 2/2 steroids (At this time patient does Not have evidence of fluid overload) At time of admission BNP Elevated likely 2/2 Vtach and decreased contractility    -Due to Type 2 thyroiditis (Treatment is limited to Steroids for antiinflammatory  effects on the thyroid gland)   If TFT fail to improve on prednisone and primary team is concerned for fluid overload at that time can consider total thyroidectomy)          Acute on chronic combined systolic and diastolic congestive heart failure  -Managed by primary team           DISCHARGE NEEDS: will assess daily    Dalton Chance MD  Endocrinology  [facility]

## 2020-01-13 NOTE — ASSESSMENT & PLAN NOTE
-Managed by cardiology   -Pt with Amiodarone induced hyperthyroidism Type 2   (Unable to be off amiodarone due to repeated Vtach episodes)   -Will manage hyperthyroidism with Prednisone and Methimazole

## 2020-01-13 NOTE — PROGRESS NOTES
"[facility]  Cardiac Electrophysiology  Progress Note    Admission Date: 1/11/2020  Code Status: Full Code   Attending Physician: Bettye Connors MD   Expected Discharge Date:   Principal Problem:Torsades de pointes    Subjective:     Interval History: At 2233, patient's HR increased to 120s with telemetry notable for wide complex tachycardia. Patient reported the sensation of his "neck constricting" as well as fatigue. Last 8 minutes and converted. Two PVCs were visible prior to initiation of WCT. Went back into WCT in the 100s with similar morphology and AS- rhythm. The patient was bolused with IV lidocaine 100 mg x 1 and then started on lidocaine 1 mg/min gtt. Additionally, VT monitoring zone was decreased to 120 bpm, tracking rate decreased to 110 from 120 bpm. Remains on amiodarone 1 mg/min.    This AM, the patient reports feeling fatigued but otherwise asymptomatic. No additional evidence of wide complex tachycardia.     Review of Systems   All other systems reviewed and are negative.    Objective:     Vital Signs (Most Recent):  Temp: 97.5 °F (36.4 °C) (01/13/20 0700)  Pulse: 74 (01/13/20 0800)  Resp: 18 (01/13/20 0800)  BP: (!) 90/0 (01/13/20 0700)  SpO2: 100 % (01/13/20 0700) Vital Signs (24h Range):  Temp:  [97.5 °F (36.4 °C)-98.8 °F (37.1 °C)] 97.5 °F (36.4 °C)  Pulse:  [] 74  Resp:  [17-28] 18  SpO2:  [92 %-100 %] 100 %  BP: ()/(0-110) 90/0     Weight: 116.1 kg (256 lb 0.7 oz)  Body mass index is 33.78 kg/m².     SpO2: 100 %  O2 Device (Oxygen Therapy): room air    Physical Exam   Constitutional: He is oriented to person, place, and time. He appears well-developed.   HENT:   Head: Atraumatic.   Eyes: Conjunctivae are normal.   Neck: Neck supple. JVD present.   Cardiovascular:   Smooth LVAD hum present   Pulmonary/Chest: Effort normal and breath sounds normal.   Abdominal: Soft.   Musculoskeletal: He exhibits no edema.   Neurological: He is alert and oriented to person, " place, and time.   Skin: Skin is warm and dry.   Psychiatric: He has a normal mood and affect.       Significant Labs:   EP:   Recent Labs   Lab 01/11/20 2135 01/12/20 0230 01/12/20 0311 01/12/20 2002 01/13/20 0318     --  138 138 139   K 3.4*  --  3.3* 4.3 3.8   CL 97  --  99 100 100   CO2 24  --  27 29 28   *  --  125* 184* 102   BUN 24*  --  25* 22* 22*   CREATININE 1.9*  --  1.9* 1.7* 1.5*   CALCIUM 9.9  --  9.4 9.6 9.1   PROT 7.7  --   --   --  6.9   ALBUMIN 3.9  --   --   --  3.4*   BILITOT 0.4  --   --   --  0.4   ALKPHOS 130  --   --   --  133   AST 25  --   --   --  34   ALT 40  --   --   --  47*   ANIONGAP 17*  --  12 9 11   ESTGFRAFRICA 46*  --  45.5* 52.1* >60.0   EGFRNONAA 39*  --  39.4* 45.0* 52.4*   WBC 7.71  --  9.59  --  8.00   HGB 14.0  --  12.7*  --  12.5*   HCT 46.8  --  42.1  --  42.5     --  228  --  238   INR 5.3* 5.1* 5.0*  --  5.2*       Significant Imaging: Echocardiogram:   2D echo with color flow doppler:   Results for orders placed or performed during the hospital encounter of 07/18/18   2D Echo w/ Color Flow Doppler   Result Value Ref Range    QEF 15 (A) 55 - 65    Mitral Valve Regurgitation SEVERE (A)     Est. PA Systolic Pressure 33.6     Mitral Valve Mobility NORMAL     Tricuspid Valve Regurgitation MILD     Narrative    Date of Procedure: 07/18/2018        TEST DESCRIPTION   Technical Quality: This is a technically challenging study. There is poor endocardial definition.     General: A catheter is present in the right-sided cardiac chambers.     Aorta: The aortic root is normal in size, measuring 2.1 cm at sinotubular junction and 3.4 cm at Sinuses of Valsalva. The proximal ascending aorta is normal in size, measuring 3.6 cm across.     Left Atrium: The left atrial volume index is severely enlarged, measuring 73.67 cc/m2.     Left Ventricle: The left ventricle is severely enlarged, with an end-diastolic diameter of 8.3 cm, and an end-systolic diameter of 7.7  cm. LV wall thickness is normal, with the septum and the posterior wall each measuring 0.8 cm across. Relative wall   thickness was normal at 0.19, and the LV mass index was increased at 172.0 g/m2 consistent with eccentric left ventricular hypertrophy. There is global hypokinesis. Left ventricular systolic function appears severely depressed. Visually estimated   ejection fraction is 15-20%.         Right Atrium: The right atrium is enlarged, measuring 6.8 cm in length and 5.1 cm in width in the apical view.     Right Ventricle: The right ventricle is normal in size measuring 3.4 cm at the base in the apical right ventricle-focused view. Global right ventricular systolic function appears moderately depressed. There is abnormal septal motion. Tricuspid annular   plane systolic excursion (TAPSE) is 1.2 cm. The estimated PA systolic pressure is 34 mmHg.     Aortic Valve:  The aortic valve is normal in structure.     Mitral Valve:  The mitral valve is normal in structure with normal leaflet mobility. There is severe mitral regurgitation.     Tricuspid Valve:  The tricuspid valve is normal in structure with normal leaflet mobility. There is mild tricuspid regurgitation.     Pulmonary Valve:  The pulmonic valve is normal in structure with normal leaflet mobility. There is trivial to mild pulmonic regurgitation.     IVC: IVC is enlarged but collapses > 50% with a sniff, suggesting intermediate right atrial pressure of 8 mmHg.     Intracavitary: There is no evidence of pericardial effusion, intracavity mass, thrombi, or vegetation.     Other: There is a left pleural effusion present.       LVAD: There is a HeartMate II LVAD in place. Inflow cannula is visualized. Outflow graft is not visualized. Baseline speed is 9400 rpms. The aortic valve opens intermittently. Septum is midline.       CONCLUSIONS     1 - Severely depressed left ventricular systolic function (EF 15-20%).     2 - Eccentric hypertrophy.     3 - Biatrial  enlargement.     4 - Moderately depressed right ventricular systolic function .     5 - The estimated PA systolic pressure is 34 mmHg.     6 - Severe mitral regurgitation.     7 - Mild tricuspid regurgitation.     8 - Trivial to mild pulmonic regurgitation.     9 - Intermediate central venous pressure.     10 - Left pleural effusion.     11 - HeartMate II LVAD; speed 9400.             This document has been electronically    SIGNED BY: Magi Delong MD On: 07/18/2018 17:10    and Transthoracic echo (TTE) complete (Cupid Only):   Results for orders placed or performed during the hospital encounter of 11/08/19   Echo Color Flow Doppler? Yes   Result Value Ref Range    Ascending aorta 2.83 cm    STJ 3.90 cm    IVS 1.00 0.6 - 1.1 cm    LA size 4.56 cm    Left Atrium Major Axis 7.08 cm    Left Atrium Minor Axis 6.79 cm    LVIDD 11.00 (A) 3.5 - 6.0 cm    LVIDS 9.31 (A) 2.1 - 4.0 cm    LVOT diameter 2.24 cm    PW 0.90 0.6 - 1.1 cm    MV Peak A Jorge 0.84 m/s    E wave decelartion time 124.12 msec    MV Peak E Jorge 0.94 m/s    RA Major Axis 5.36 cm    RA Width 4.62 cm    RVDD 3.61 cm    Sinus 3.37 cm    TAPSE 1.68 cm    TR Max Jorge 1.94 m/s    TDI LATERAL 0.07 m/s    TDI SEPTAL 0.10 m/s    LA WIDTH 5.03 cm    LV Diastolic Volume 538.72 mL    LV Systolic Volume 482.19 mL    LV LATERAL E/E' RATIO 13.43 m/s    LV SEPTAL E/E' RATIO 9.40 m/s    FS 15 %    LA volume 135.15 cm3    LV mass 679.25 g    Left Ventricle Relative Wall Thickness 0.16 cm    E/A ratio 1.12     Mean e' 0.09 m/s    LVOT area 3.9 cm2    E/E' ratio 11.06 m/s    LV Systolic Volume Index 199.8 mL/m2    LV Diastolic Volume Index 223.21 mL/m2    LA Volume Index 56.0 mL/m2    LV Mass Index 281 g/m2    Triscuspid Valve Regurgitation Peak Gradient 15 mmHg    BSA 2.47 m2    Right Atrial Pressure (from IVC) 3 mmHg    TV rest pulmonary artery pressure 18 mmHg    Narrative    · LVAD present. [place] 422[address] RPMs. The pump type is a Heartmate II.  · The  interventricular septum appears midline. The aortic valve does not   open.  · Severely decreased left ventricular systolic function. The estimated   ejection fraction is 10%  · Severe left ventricular enlargement. LV end diastolic dimension 11 cm.  · Eccentric left ventricular hypertrophy.  · Grade I (mild) left ventricular diastolic dysfunction consistent with   impaired relaxation.  · Mildly to moderately reduced right ventricular systolic function.  · Severe left atrial enlargement.  · Moderate right atrial enlargement.  · Mild-to-moderate mitral regurgitation.  · Normal central venous pressure (3 mm Hg).  · The estimated PA systolic pressure is 18 mm Hg        Assessment and Plan:     Ventricular tachycardia  - underwent sedation + cardioversion in ER  - likely exacerbated due to acute decompensated heart failure in the setting of prednisone; will treat underlying etiology. Diuresing per primary team.  - continue IV amiodarone gtt till load completes, then PO amio 400mg x 2 weeks followed by 400mg daily. Also remains on lidocaine 1 mg/min -> recommend discontinuing lidocaine today  - WCT ddx includes VT vs. SVT with aberrancy vs. PMT; most likely PMT. Decreased monitoring and tracking zones as mentioned above   - formal interrogation of device today -> changed settings from DDD -> VVI at 50 bpm given PMT on interrogation   - will continue to monitor on telemetry  - routine labs; goal K > 4, Mg > 2  - patient will require generator change with addition of new RV lead/dual coil (if it's not optimally apical, e.g., or one with an SVC coil) vs/+ azygous coil or CS coil. Likely will be completed prior to discharge once HD optimized, INR has improved (currently 5)        Mallory Bowser MD  Cardiac Electrophysiology  [facility]

## 2020-01-13 NOTE — PROGRESS NOTES
01/13/2020  Fitz Christianson    Current provider:  Bettye Connors MD      I, Fitz Christianson, rounded on Tim Richards to ensure all mechanical assist device settings (IABP or VAD) were appropriate and all parameters were within limits.  I was able to ensure all back up equipment was present, the staff had no issues, and the Perfusion Department daily rounding was complete.    11:54 AM

## 2020-01-13 NOTE — ASSESSMENT & PLAN NOTE
Anticoagulation - warfarin on hold due to supra therapeutic INR  Procedure: Device Interrogation Including analysis of device parameters  Current Settings: Ventricular Assist Device  Review of device function is stable/unstable stable    TXP LVAD INTERROGATIONS 1/13/2020 1/13/2020 1/13/2020 1/13/2020 1/13/2020 1/13/2020 1/13/2020   Type HeartMate II HeartMate II HeartMate II HeartMate II HeartMate II HeartMate II HeartMate II   Flow 5.9 6 6.2 5.3 5.6 6 5.7   Speed 85737 78561 70042 29068 31372 93869 27949   PI 3 3 3 3.2 3.5 3.4 3.5   Power (Jackson) 7 7.1 7.1 6.9 6.8 7 6.9   LSL 9600 9600 9600 9600 9600 9600 9600   Pulsatility - - - - - - -

## 2020-01-13 NOTE — PROGRESS NOTES
HTS, Dr Lehman called to BS for low rate  bpm. Pt symptomatic, SOB, slightly diaphoretic.  ICD interrogated, apparently not VT, but axis change and V pacing. Pt states that he can feel the pacing and that he does feel well when pacing. Pt started on Lidocaine gtt with 100 mg bolus.

## 2020-01-13 NOTE — ASSESSMENT & PLAN NOTE
- Likely due to amiodarone per endocrinology.  - Check US thyroid, TSI and TPO ab per endo recs  - Continue prednisone 60 mg, restarted on methimazole 60 mg BID

## 2020-01-13 NOTE — SUBJECTIVE & OBJECTIVE
Interval HPI:   Pt is AAOX3, HR 80s, Tmax 97.5,  Denies chest pain or SOB.    NO EVIDENCE OF THYROID STORM.    Overnight events:  NINA   Eatin%  Nausea: No  Hypoglycemia and intervention: No  Fever: No  TPN and/or TF: No    PMH, PSH, FH, SH updated and reviewed     ROS:     Review of Systems   Constitutional: Positive for diaphoresis. Negative for appetite change.   HENT: Negative for trouble swallowing.    Eyes: Negative for visual disturbance.   Respiratory: Negative for shortness of breath.    Cardiovascular: Negative for chest pain.   Gastrointestinal: Negative for constipation.   Endocrine: Positive for heat intolerance. Negative for cold intolerance.   Skin: Negative for rash.   Hematological: Negative for adenopathy.   Psychiatric/Behavioral: Negative for agitation.         PHYSICAL EXAMINATION:  Vitals:    20 1200   BP:    Pulse: 75   Resp: 20   Temp:      Body mass index is 33.78 kg/m².    Physical Exam   Constitutional: He appears well-developed.   HENT:   Right Ear: External ear normal.   Left Ear: External ear normal.   Nose: Nose normal.   Neck: No tracheal deviation present. No thyromegaly present.   Cardiovascular: Normal rate.   No murmur heard.  Pulmonary/Chest: Effort normal and breath sounds normal.   Abdominal: Soft. He exhibits no mass. No hernia.   Musculoskeletal: He exhibits no edema.   Neurological: He is alert. No cranial nerve deficit or sensory deficit. Coordination normal.   Skin: No rash noted.   Psychiatric: He has a normal mood and affect. Judgment normal.   Vitals reviewed.

## 2020-01-13 NOTE — SUBJECTIVE & OBJECTIVE
"Interval History: At 2233, patient's HR increased to 120s with telemetry notable for wide complex tachycardia. Patient reported the sensation of his "neck constricting" as well as fatigue. Last 8 minutes and converted. Two PVCs were visible prior to initiation of WCT. Went back into WCT in the 100s with similar morphology and AS- rhythm. The patient was bolused with IV lidocaine 100 mg x 1 and then started on lidocaine 1 mg/min gtt. Additionally, VT monitoring zone was decreased to 120 bpm, tracking rate decreased to 110 from 120 bpm. Remains on amiodarone 1 mg/min.    This AM, the patient reports feeling fatigued but otherwise asymptomatic. No additional evidence of wide complex tachycardia.     Review of Systems   All other systems reviewed and are negative.    Objective:     Vital Signs (Most Recent):  Temp: 97.5 °F (36.4 °C) (01/13/20 0700)  Pulse: 74 (01/13/20 0800)  Resp: 18 (01/13/20 0800)  BP: (!) 90/0 (01/13/20 0700)  SpO2: 100 % (01/13/20 0700) Vital Signs (24h Range):  Temp:  [97.5 °F (36.4 °C)-98.8 °F (37.1 °C)] 97.5 °F (36.4 °C)  Pulse:  [] 74  Resp:  [17-28] 18  SpO2:  [92 %-100 %] 100 %  BP: ()/(0-110) 90/0     Weight: 116.1 kg (256 lb 0.7 oz)  Body mass index is 33.78 kg/m².     SpO2: 100 %  O2 Device (Oxygen Therapy): room air    Physical Exam   Constitutional: He is oriented to person, place, and time. He appears well-developed.   HENT:   Head: Atraumatic.   Eyes: Conjunctivae are normal.   Neck: Neck supple. JVD present.   Cardiovascular:   Smooth LVAD hum present   Pulmonary/Chest: Effort normal and breath sounds normal.   Abdominal: Soft.   Musculoskeletal: He exhibits no edema.   Neurological: He is alert and oriented to person, place, and time.   Skin: Skin is warm and dry.   Psychiatric: He has a normal mood and affect.       Significant Labs:   EP:   Recent Labs   Lab 01/11/20  2135 01/12/20  0230 01/12/20  0311 01/12/20 2002 01/13/20  0318     --  138 138 139   K 3.4*  " --  3.3* 4.3 3.8   CL 97  --  99 100 100   CO2 24  --  27 29 28   *  --  125* 184* 102   BUN 24*  --  25* 22* 22*   CREATININE 1.9*  --  1.9* 1.7* 1.5*   CALCIUM 9.9  --  9.4 9.6 9.1   PROT 7.7  --   --   --  6.9   ALBUMIN 3.9  --   --   --  3.4*   BILITOT 0.4  --   --   --  0.4   ALKPHOS 130  --   --   --  133   AST 25  --   --   --  34   ALT 40  --   --   --  47*   ANIONGAP 17*  --  12 9 11   ESTGFRAFRICA 46*  --  45.5* 52.1* >60.0   EGFRNONAA 39*  --  39.4* 45.0* 52.4*   WBC 7.71  --  9.59  --  8.00   HGB 14.0  --  12.7*  --  12.5*   HCT 46.8  --  42.1  --  42.5     --  228  --  238   INR 5.3* 5.1* 5.0*  --  5.2*       Significant Imaging: Echocardiogram:   2D echo with color flow doppler:   Results for orders placed or performed during the hospital encounter of 07/18/18   2D Echo w/ Color Flow Doppler   Result Value Ref Range    QEF 15 (A) 55 - 65    Mitral Valve Regurgitation SEVERE (A)     Est. PA Systolic Pressure 33.6     Mitral Valve Mobility NORMAL     Tricuspid Valve Regurgitation MILD     Narrative    Date of Procedure: 07/18/2018        TEST DESCRIPTION   Technical Quality: This is a technically challenging study. There is poor endocardial definition.     General: A catheter is present in the right-sided cardiac chambers.     Aorta: The aortic root is normal in size, measuring 2.1 cm at sinotubular junction and 3.4 cm at Sinuses of Valsalva. The proximal ascending aorta is normal in size, measuring 3.6 cm across.     Left Atrium: The left atrial volume index is severely enlarged, measuring 73.67 cc/m2.     Left Ventricle: The left ventricle is severely enlarged, with an end-diastolic diameter of 8.3 cm, and an end-systolic diameter of 7.7 cm. LV wall thickness is normal, with the septum and the posterior wall each measuring 0.8 cm across. Relative wall   thickness was normal at 0.19, and the LV mass index was increased at 172.0 g/m2 consistent with eccentric left ventricular hypertrophy.  There is global hypokinesis. Left ventricular systolic function appears severely depressed. Visually estimated   ejection fraction is 15-20%.         Right Atrium: The right atrium is enlarged, measuring 6.8 cm in length and 5.1 cm in width in the apical view.     Right Ventricle: The right ventricle is normal in size measuring 3.4 cm at the base in the apical right ventricle-focused view. Global right ventricular systolic function appears moderately depressed. There is abnormal septal motion. Tricuspid annular   plane systolic excursion (TAPSE) is 1.2 cm. The estimated PA systolic pressure is 34 mmHg.     Aortic Valve:  The aortic valve is normal in structure.     Mitral Valve:  The mitral valve is normal in structure with normal leaflet mobility. There is severe mitral regurgitation.     Tricuspid Valve:  The tricuspid valve is normal in structure with normal leaflet mobility. There is mild tricuspid regurgitation.     Pulmonary Valve:  The pulmonic valve is normal in structure with normal leaflet mobility. There is trivial to mild pulmonic regurgitation.     IVC: IVC is enlarged but collapses > 50% with a sniff, suggesting intermediate right atrial pressure of 8 mmHg.     Intracavitary: There is no evidence of pericardial effusion, intracavity mass, thrombi, or vegetation.     Other: There is a left pleural effusion present.       LVAD: There is a HeartMate II LVAD in place. Inflow cannula is visualized. Outflow graft is not visualized. Baseline speed is 9400 rpms. The aortic valve opens intermittently. Septum is midline.       CONCLUSIONS     1 - Severely depressed left ventricular systolic function (EF 15-20%).     2 - Eccentric hypertrophy.     3 - Biatrial enlargement.     4 - Moderately depressed right ventricular systolic function .     5 - The estimated PA systolic pressure is 34 mmHg.     6 - Severe mitral regurgitation.     7 - Mild tricuspid regurgitation.     8 - Trivial to mild pulmonic regurgitation.      9 - Intermediate central venous pressure.     10 - Left pleural effusion.     11 - HeartMate II LVAD; speed 9400.             This document has been electronically    SIGNED BY: Magi Delong MD On: 07/18/2018 17:10    and Transthoracic echo (TTE) complete (Cupid Only):   Results for orders placed or performed during the hospital encounter of 11/08/19   Echo Color Flow Doppler? Yes   Result Value Ref Range    Ascending aorta 2.83 cm    STJ 3.90 cm    IVS 1.00 0.6 - 1.1 cm    LA size 4.56 cm    Left Atrium Major Axis 7.08 cm    Left Atrium Minor Axis 6.79 cm    LVIDD 11.00 (A) 3.5 - 6.0 cm    LVIDS 9.31 (A) 2.1 - 4.0 cm    LVOT diameter 2.24 cm    PW 0.90 0.6 - 1.1 cm    MV Peak A Jorge 0.84 m/s    E wave decelartion time 124.12 msec    MV Peak E Jorge 0.94 m/s    RA Major Axis 5.36 cm    RA Width 4.62 cm    RVDD 3.61 cm    Sinus 3.37 cm    TAPSE 1.68 cm    TR Max Jorge 1.94 m/s    TDI LATERAL 0.07 m/s    TDI SEPTAL 0.10 m/s    LA WIDTH 5.03 cm    LV Diastolic Volume 538.72 mL    LV Systolic Volume 482.19 mL    LV LATERAL E/E' RATIO 13.43 m/s    LV SEPTAL E/E' RATIO 9.40 m/s    FS 15 %    LA volume 135.15 cm3    LV mass 679.25 g    Left Ventricle Relative Wall Thickness 0.16 cm    E/A ratio 1.12     Mean e' 0.09 m/s    LVOT area 3.9 cm2    E/E' ratio 11.06 m/s    LV Systolic Volume Index 199.8 mL/m2    LV Diastolic Volume Index 223.21 mL/m2    LA Volume Index 56.0 mL/m2    LV Mass Index 281 g/m2    Triscuspid Valve Regurgitation Peak Gradient 15 mmHg    BSA 2.47 m2    Right Atrial Pressure (from IVC) 3 mmHg    TV rest pulmonary artery pressure 18 mmHg    Narrative    · LVAD present. [place] [address] RPMs. The pump type is a Heartmate II.  · The interventricular septum appears midline. The aortic valve does not   open.  · Severely decreased left ventricular systolic function. The estimated   ejection fraction is 10%  · Severe left ventricular enlargement. LV end diastolic dimension 11 cm.  · Eccentric left  ventricular hypertrophy.  · Grade I (mild) left ventricular diastolic dysfunction consistent with   impaired relaxation.  · Mildly to moderately reduced right ventricular systolic function.  · Severe left atrial enlargement.  · Moderate right atrial enlargement.  · Mild-to-moderate mitral regurgitation.  · Normal central venous pressure (3 mm Hg).  · The estimated PA systolic pressure is 18 mm Hg

## 2020-01-14 LAB
ANION GAP SERPL CALC-SCNC: 10 MMOL/L (ref 8–16)
ANION GAP SERPL CALC-SCNC: 10 MMOL/L (ref 8–16)
ANION GAP SERPL CALC-SCNC: 9 MMOL/L (ref 8–16)
APTT BLDCRRT: 36.2 SEC (ref 21–32)
BASOPHILS # BLD AUTO: 0.02 K/UL (ref 0–0.2)
BASOPHILS NFR BLD: 0.3 % (ref 0–1.9)
BUN SERPL-MCNC: 20 MG/DL (ref 6–20)
BUN SERPL-MCNC: 21 MG/DL (ref 6–20)
BUN SERPL-MCNC: 23 MG/DL (ref 6–20)
CALCIUM SERPL-MCNC: 9.5 MG/DL (ref 8.7–10.5)
CALCIUM SERPL-MCNC: 9.6 MG/DL (ref 8.7–10.5)
CALCIUM SERPL-MCNC: 9.8 MG/DL (ref 8.7–10.5)
CHLORIDE SERPL-SCNC: 94 MMOL/L (ref 95–110)
CHLORIDE SERPL-SCNC: 95 MMOL/L (ref 95–110)
CHLORIDE SERPL-SCNC: 96 MMOL/L (ref 95–110)
CO2 SERPL-SCNC: 29 MMOL/L (ref 23–29)
CO2 SERPL-SCNC: 29 MMOL/L (ref 23–29)
CO2 SERPL-SCNC: 33 MMOL/L (ref 23–29)
CREAT SERPL-MCNC: 1.4 MG/DL (ref 0.5–1.4)
CREAT SERPL-MCNC: 1.6 MG/DL (ref 0.5–1.4)
CREAT SERPL-MCNC: 1.7 MG/DL (ref 0.5–1.4)
DIFFERENTIAL METHOD: ABNORMAL
EOSINOPHIL # BLD AUTO: 0 K/UL (ref 0–0.5)
EOSINOPHIL NFR BLD: 0.1 % (ref 0–8)
ERYTHROCYTE [DISTWIDTH] IN BLOOD BY AUTOMATED COUNT: 14.6 % (ref 11.5–14.5)
EST. GFR  (AFRICAN AMERICAN): 52.1 ML/MIN/1.73 M^2
EST. GFR  (AFRICAN AMERICAN): 56 ML/MIN/1.73 M^2
EST. GFR  (AFRICAN AMERICAN): >60 ML/MIN/1.73 M^2
EST. GFR  (NON AFRICAN AMERICAN): 45 ML/MIN/1.73 M^2
EST. GFR  (NON AFRICAN AMERICAN): 48.5 ML/MIN/1.73 M^2
EST. GFR  (NON AFRICAN AMERICAN): 57 ML/MIN/1.73 M^2
GLUCOSE SERPL-MCNC: 109 MG/DL (ref 70–110)
GLUCOSE SERPL-MCNC: 157 MG/DL (ref 70–110)
GLUCOSE SERPL-MCNC: 93 MG/DL (ref 70–110)
HCT VFR BLD AUTO: 45.7 % (ref 40–54)
HGB BLD-MCNC: 13.6 G/DL (ref 14–18)
IMM GRANULOCYTES # BLD AUTO: 0.04 K/UL (ref 0–0.04)
IMM GRANULOCYTES NFR BLD AUTO: 0.5 % (ref 0–0.5)
INR PPP: 2.7 (ref 0.8–1.2)
LDH SERPL L TO P-CCNC: 375 U/L (ref 110–260)
LYMPHOCYTES # BLD AUTO: 0.8 K/UL (ref 1–4.8)
LYMPHOCYTES NFR BLD: 10.1 % (ref 18–48)
MAGNESIUM SERPL-MCNC: 1.8 MG/DL (ref 1.6–2.6)
MAGNESIUM SERPL-MCNC: 2.2 MG/DL (ref 1.6–2.6)
MAGNESIUM SERPL-MCNC: 2.4 MG/DL (ref 1.6–2.6)
MCH RBC QN AUTO: 25.4 PG (ref 27–31)
MCHC RBC AUTO-ENTMCNC: 29.8 G/DL (ref 32–36)
MCV RBC AUTO: 85 FL (ref 82–98)
MONOCYTES # BLD AUTO: 0.9 K/UL (ref 0.3–1)
MONOCYTES NFR BLD: 11.6 % (ref 4–15)
NEUTROPHILS # BLD AUTO: 5.8 K/UL (ref 1.8–7.7)
NEUTROPHILS NFR BLD: 77.4 % (ref 38–73)
NRBC BLD-RTO: 0 /100 WBC
PHOSPHATE SERPL-MCNC: 3.2 MG/DL (ref 2.7–4.5)
PLATELET # BLD AUTO: 250 K/UL (ref 150–350)
PMV BLD AUTO: 10 FL (ref 9.2–12.9)
POTASSIUM SERPL-SCNC: 3.2 MMOL/L (ref 3.5–5.1)
POTASSIUM SERPL-SCNC: 3.8 MMOL/L (ref 3.5–5.1)
POTASSIUM SERPL-SCNC: 4.2 MMOL/L (ref 3.5–5.1)
PROTHROMBIN TIME: 25.2 SEC (ref 9–12.5)
RBC # BLD AUTO: 5.35 M/UL (ref 4.6–6.2)
SODIUM SERPL-SCNC: 132 MMOL/L (ref 136–145)
SODIUM SERPL-SCNC: 135 MMOL/L (ref 136–145)
SODIUM SERPL-SCNC: 138 MMOL/L (ref 136–145)
T4 FREE SERPL-MCNC: 3.91 NG/DL (ref 0.71–1.51)
TSH SERPL DL<=0.005 MIU/L-ACNC: <0.01 UIU/ML (ref 0.4–4)
WBC # BLD AUTO: 7.44 K/UL (ref 3.9–12.7)

## 2020-01-14 PROCEDURE — 93750 INTERROGATION VAD IN PERSON: CPT | Mod: ,,, | Performed by: INTERNAL MEDICINE

## 2020-01-14 PROCEDURE — 27000248 HC VAD-ADDITIONAL DAY

## 2020-01-14 PROCEDURE — 94761 N-INVAS EAR/PLS OXIMETRY MLT: CPT

## 2020-01-14 PROCEDURE — 99232 PR SUBSEQUENT HOSPITAL CARE,LEVL II: ICD-10-PCS | Mod: ,,, | Performed by: INTERNAL MEDICINE

## 2020-01-14 PROCEDURE — 27000685 HC LVAD KIT (30 DAY SUPPLY)

## 2020-01-14 PROCEDURE — 63600175 PHARM REV CODE 636 W HCPCS: Performed by: STUDENT IN AN ORGANIZED HEALTH CARE EDUCATION/TRAINING PROGRAM

## 2020-01-14 PROCEDURE — 20000000 HC ICU ROOM

## 2020-01-14 PROCEDURE — 99232 SBSQ HOSP IP/OBS MODERATE 35: CPT | Mod: ,,, | Performed by: INTERNAL MEDICINE

## 2020-01-14 PROCEDURE — 63600175 PHARM REV CODE 636 W HCPCS: Performed by: INTERNAL MEDICINE

## 2020-01-14 PROCEDURE — 84439 ASSAY OF FREE THYROXINE: CPT

## 2020-01-14 PROCEDURE — 83735 ASSAY OF MAGNESIUM: CPT | Mod: 91

## 2020-01-14 PROCEDURE — 99233 PR SUBSEQUENT HOSPITAL CARE,LEVL III: ICD-10-PCS | Mod: ,,, | Performed by: INTERNAL MEDICINE

## 2020-01-14 PROCEDURE — 93750 PR INTERROGATE VENT ASSIST DEV, IN PERSON, W PHYSICIAN ANALYSIS: ICD-10-PCS | Mod: ,,, | Performed by: INTERNAL MEDICINE

## 2020-01-14 PROCEDURE — 25000003 PHARM REV CODE 250: Performed by: INTERNAL MEDICINE

## 2020-01-14 PROCEDURE — 83735 ASSAY OF MAGNESIUM: CPT

## 2020-01-14 PROCEDURE — 25000003 PHARM REV CODE 250: Performed by: GENERAL ACUTE CARE HOSPITAL

## 2020-01-14 PROCEDURE — 85610 PROTHROMBIN TIME: CPT

## 2020-01-14 PROCEDURE — 99233 SBSQ HOSP IP/OBS HIGH 50: CPT | Mod: ,,, | Performed by: INTERNAL MEDICINE

## 2020-01-14 PROCEDURE — 85025 COMPLETE CBC W/AUTO DIFF WBC: CPT

## 2020-01-14 PROCEDURE — 84443 ASSAY THYROID STIM HORMONE: CPT

## 2020-01-14 PROCEDURE — 80048 BASIC METABOLIC PNL TOTAL CA: CPT | Mod: 91

## 2020-01-14 PROCEDURE — 25000003 PHARM REV CODE 250: Performed by: STUDENT IN AN ORGANIZED HEALTH CARE EDUCATION/TRAINING PROGRAM

## 2020-01-14 PROCEDURE — 80048 BASIC METABOLIC PNL TOTAL CA: CPT

## 2020-01-14 PROCEDURE — 83615 LACTATE (LD) (LDH) ENZYME: CPT

## 2020-01-14 PROCEDURE — 85730 THROMBOPLASTIN TIME PARTIAL: CPT

## 2020-01-14 PROCEDURE — 84100 ASSAY OF PHOSPHORUS: CPT

## 2020-01-14 RX ORDER — POTASSIUM CHLORIDE 20 MEQ/1
20 TABLET, EXTENDED RELEASE ORAL ONCE
Status: COMPLETED | OUTPATIENT
Start: 2020-01-14 | End: 2020-01-14

## 2020-01-14 RX ORDER — WARFARIN 2.5 MG/1
2.5 TABLET ORAL DAILY
Status: DISCONTINUED | OUTPATIENT
Start: 2020-01-14 | End: 2020-01-15

## 2020-01-14 RX ORDER — POTASSIUM CHLORIDE 7.45 MG/ML
10 INJECTION INTRAVENOUS
Status: COMPLETED | OUTPATIENT
Start: 2020-01-14 | End: 2020-01-14

## 2020-01-14 RX ORDER — MAGNESIUM SULFATE HEPTAHYDRATE 40 MG/ML
2 INJECTION, SOLUTION INTRAVENOUS ONCE
Status: COMPLETED | OUTPATIENT
Start: 2020-01-14 | End: 2020-01-14

## 2020-01-14 RX ORDER — POTASSIUM CHLORIDE 20 MEQ/1
40 TABLET, EXTENDED RELEASE ORAL ONCE
Status: COMPLETED | OUTPATIENT
Start: 2020-01-14 | End: 2020-01-14

## 2020-01-14 RX ADMIN — AMIODARONE HYDROCHLORIDE 1 MG/MIN: 1.8 INJECTION, SOLUTION INTRAVENOUS at 09:01

## 2020-01-14 RX ADMIN — FERROUS GLUCONATE TAB 324 MG (37.5 MG ELEMENTAL IRON) 324 MG: 324 (37.5 FE) TAB at 09:01

## 2020-01-14 RX ADMIN — Medication 400 MG: at 08:01

## 2020-01-14 RX ADMIN — DOXAZOSIN 8 MG: 4 TABLET ORAL at 09:01

## 2020-01-14 RX ADMIN — POTASSIUM CHLORIDE 10 MEQ: 7.46 INJECTION, SOLUTION INTRAVENOUS at 05:01

## 2020-01-14 RX ADMIN — POTASSIUM CHLORIDE 10 MEQ: 7.46 INJECTION, SOLUTION INTRAVENOUS at 08:01

## 2020-01-14 RX ADMIN — Medication 400 MG: at 09:01

## 2020-01-14 RX ADMIN — OXYCODONE HYDROCHLORIDE 10 MG: 10 TABLET ORAL at 09:01

## 2020-01-14 RX ADMIN — PANTOPRAZOLE SODIUM 40 MG: 40 TABLET, DELAYED RELEASE ORAL at 09:01

## 2020-01-14 RX ADMIN — FUROSEMIDE 20 MG/HR: 10 INJECTION, SOLUTION INTRAMUSCULAR; INTRAVENOUS at 07:01

## 2020-01-14 RX ADMIN — AMIODARONE HYDROCHLORIDE 1 MG/MIN: 1.8 INJECTION, SOLUTION INTRAVENOUS at 03:01

## 2020-01-14 RX ADMIN — POTASSIUM CHLORIDE 20 MEQ: 1500 TABLET, EXTENDED RELEASE ORAL at 12:01

## 2020-01-14 RX ADMIN — AMLODIPINE BESYLATE 10 MG: 10 TABLET ORAL at 02:01

## 2020-01-14 RX ADMIN — POTASSIUM CHLORIDE 10 MEQ: 7.46 INJECTION, SOLUTION INTRAVENOUS at 07:01

## 2020-01-14 RX ADMIN — OXYCODONE HYDROCHLORIDE 10 MG: 10 TABLET ORAL at 02:01

## 2020-01-14 RX ADMIN — POTASSIUM CHLORIDE 40 MEQ: 1500 TABLET, EXTENDED RELEASE ORAL at 05:01

## 2020-01-14 RX ADMIN — ALLOPURINOL 100 MG: 100 TABLET ORAL at 08:01

## 2020-01-14 RX ADMIN — METHIMAZOLE 20 MG: 10 TABLET ORAL at 08:01

## 2020-01-14 RX ADMIN — FUROSEMIDE 20 MG/HR: 10 INJECTION, SOLUTION INTRAMUSCULAR; INTRAVENOUS at 06:01

## 2020-01-14 RX ADMIN — METHIMAZOLE 20 MG: 10 TABLET ORAL at 09:01

## 2020-01-14 RX ADMIN — DOXAZOSIN 8 MG: 4 TABLET ORAL at 08:01

## 2020-01-14 RX ADMIN — ACETAMINOPHEN 650 MG: 325 TABLET ORAL at 07:01

## 2020-01-14 RX ADMIN — MAGNESIUM SULFATE IN WATER 2 G: 40 INJECTION, SOLUTION INTRAVENOUS at 12:01

## 2020-01-14 RX ADMIN — ACETAMINOPHEN 650 MG: 325 TABLET ORAL at 05:01

## 2020-01-14 RX ADMIN — PREDNISONE 60 MG: 20 TABLET ORAL at 09:01

## 2020-01-14 RX ADMIN — Medication 400 MG: at 02:01

## 2020-01-14 RX ADMIN — WARFARIN SODIUM 2.5 MG: 2.5 TABLET ORAL at 04:01

## 2020-01-14 RX ADMIN — POTASSIUM CHLORIDE 10 MEQ: 7.46 INJECTION, SOLUTION INTRAVENOUS at 09:01

## 2020-01-14 RX ADMIN — OXYCODONE HYDROCHLORIDE 10 MG: 10 TABLET ORAL at 06:01

## 2020-01-14 NOTE — PROGRESS NOTES
"[facility]  Endocrinology  Progress Note    Admit Date: 2020     55 YO Male w/ diagnosis of Amiodarone induced hyperthyroidism Type 2, HTN ,DCM,(EF 10%, grade III DD) s/p HM II with Outflow graft changed (18) as DT, BiV-Icd [employer] () and obesity transferred from  for VT now in VF, shocked by device x 7 times.  Pt was admitted to Cardiology unit.  During hospital course patient noted to have TSH < 0.01 and FT4 3.9.  Pt is being followed outpatient with endocrinology for Amiodarone induced hyperthyroidism Type 2 since .  Pt was started on Prednisone 60mg and Methimazole 20mg qam and 10mg qPM.  Pt has been having fluctuating TFT in the past due to prednisone non compliance.  Pt states he ran out of Prednisone x 1 week prior to admission due to running out of medications.  Pt endorses tremors, palpitations and diaphoresis since stopping Prednisone.  Pt was consulted with endocrinology for management of hyperthyroidism.      Interval HPI:   Pt is AAOX4,  HR 80s, Tmax 97.9, FT4 3.91 today (Unchanged).  Pt clinically Euthyroid.  NO evidence of Thyroid storm.   Pt had Thyroid US yesterday showing thyromegaly and Normal blood flow which is suggestive of Type 2 amiodarone induce hyperthyroidism.     Overnight events: NINA   Eatin%  Nausea: No  Hypoglycemia and intervention: No  Fever: No  TPN and/or TF: No    BP (!) 94/57 (BP Location: Left arm, Patient Position: Lying)   Pulse 68   Temp 97.9 °F (36.6 °C) (Oral)   Resp 18   Ht 6' 1" (1.854 m)   Wt 119.3 kg (263 lb 0.1 oz)   SpO2 96%   BMI 34.70 kg/m²      Labs Reviewed and Include    Recent Labs   Lab 20  0336   GLU 93   CALCIUM 9.6      K 3.2*   CO2 33*   CL 95   BUN 20   CREATININE 1.4     Lab Results   Component Value Date    WBC 7.44 2020    HGB 13.6 (L) 2020    HCT 45.7 2020    MCV 85 2020     2020     Recent Labs   Lab 20  2135 20  0929   TSH <0.010* " <0.010*   FREET4 3.91* 3.91*     Lab Results   Component Value Date    HGBA1C 5.5 03/08/2018       Nutritional status:   Body mass index is 34.7 kg/m².  Lab Results   Component Value Date    ALBUMIN 3.4 (L) 01/13/2020    ALBUMIN 3.9 01/11/2020    ALBUMIN 4.4 12/12/2019     Lab Results   Component Value Date    PREALBUMIN 24 01/13/2020    PREALBUMIN 40 12/05/2019    PREALBUMIN 27 11/14/2019       Estimated Creatinine Clearance: 82.6 mL/min (based on SCr of 1.4 mg/dL).    Accu-Checks  No results for input(s): POCTGLUCOSE in the last 72 hours.    Current Medications and/or Treatments Impacting Glycemic Control  Immunotherapy:    Immunosuppressants     None        Steroids:   Hormones (From admission, onward)    Start     Stop Route Frequency Ordered    01/12/20 0900  predniSONE tablet 60 mg      -- Oral Daily 01/12/20 0209        Pressors:    Autonomic Drugs (From admission, onward)    None        Hyperglycemia/Diabetes Medications:   Antihyperglycemics (From admission, onward)    None          ASSESSMENT and PLAN    VF (ventricular fibrillation)  -Managed by cardiology   -Pt with Amiodarone induced hyperthyroidism Type 2   (Unable to be off amiodarone due to repeated Vtach episodes)   -Will manage hyperthyroidism with Prednisone and Methimazole         Amiodarone-induced hyperthyroidism  -Pt on Amiodarone since 2012   -Currently on Amiodarone drip   -Likely Type 2 Amiodarone hyperthyroidism in the setting of chronic amiodarone and Negative Ab   -TRAb (-)   -Thyroid US from 1/13/20 Showing Thyromegaly and Normal vascularity which is suggestive of Type 2 Amiodarone induced hyperthyroidism   -No clinical evidence of Thyroid storm   -TSH < 0.01,   FT4 3.9  (Hyperthyroid likely 2/2 prednisone non compliance x 1 week)     Plan:   -C/w Prednisone 60mg PO daily   -C/w  Methimazole to 20mg BID   -Follow TSI and TPO Ab   -Repeat TFT in 48-72hrs       -Primary team concerned for Fluid overload 2/2 steroids    -Due to Type 2  thyroiditis (Treatment is limited to Steroids for antiinflammatory effects on the thyroid gland and Thyroidectomy)   Would recommend normalization of TFT with prednisone and Methimazole prior to surgical evaluation due to degree of hyperthyroidism and patient being a high risk surgical candidate due to advance cardiac disease.     Acute on chronic systolic congestive heart failure  -Managed by primary team           Dalton Chance MD  Endocrinology  [facility]

## 2020-01-14 NOTE — ASSESSMENT & PLAN NOTE
- Underwent sedation +external shock in ER  - Likely exacerbated due to acute decompensated heart failure and hyperthyroidism; will treat underlying etiology  - Continue IV amiodarone for now at 1 mg/min per EP recs  - EP on board, ICD settings adjusted   - Would like to hold off on procedures until TFT's normalize   25-Oct-2018 13:38

## 2020-01-14 NOTE — SUBJECTIVE & OBJECTIVE
Interval History: One episode of NSVT last night, had some palpitations. He is net neg 5.9 L in the past 24 hours. Feels well this AM.     Continuous Infusions:   amiodarone in dextrose 5% 1 mg/min (01/14/20 1200)    furosemide (LASIX) 2 mg/mL infusion (non-titrating) 20 mg/hr (01/14/20 1200)     Scheduled Meds:   allopurinol  100 mg Oral Daily    amLODIPine  10 mg Oral Daily    doxazosin  8 mg Oral Q12H    ferrous gluconate  324 mg Oral Daily with breakfast    magnesium oxide  400 mg Oral TID    methIMAzole  20 mg Oral BID    pantoprazole  40 mg Oral Daily    predniSONE  60 mg Oral Daily    warfarin  2.5 mg Oral Daily     PRN Meds:acetaminophen, ondansetron, oxyCODONE    Review of patient's allergies indicates:   Allergen Reactions    Lisinopril Anaphylaxis     Objective:     Vital Signs (Most Recent):  Temp: 98.1 °F (36.7 °C) (01/14/20 1100)  Pulse: 68 (01/14/20 1200)  Resp: 18 (01/14/20 1200)  BP: (!) 78/0 (01/14/20 1100)  SpO2: 99 % (01/14/20 1100) Vital Signs (24h Range):  Temp:  [97.9 °F (36.6 °C)-98.3 °F (36.8 °C)] 98.1 °F (36.7 °C)  Pulse:  [68-82] 68  Resp:  [18-32] 18  SpO2:  [96 %-99 %] 99 %  BP: (78-98)/(0-66) 78/0     Patient Vitals for the past 72 hrs (Last 3 readings):   Weight   01/14/20 0400 119.3 kg (263 lb 0.1 oz)   01/13/20 0500 116.1 kg (256 lb 0.7 oz)   01/12/20 0400 121.5 kg (267 lb 13.7 oz)     Body mass index is 34.7 kg/m².      Intake/Output Summary (Last 24 hours) at 1/14/2020 1226  Last data filed at 1/14/2020 1200  Gross per 24 hour   Intake 1708.95 ml   Output 5940 ml   Net -4231.05 ml       Physical Exam   Constitutional: He is oriented to person, place, and time. He appears well-developed.   Neck: JVD (8 cm H2O at 30 degrees) present.   Cardiovascular: Normal rate.   Murmur (VAD) heard.  Pulmonary/Chest: Effort normal and breath sounds normal.   Abdominal: Soft. Bowel sounds are normal.   Musculoskeletal: Normal range of motion. He exhibits no edema.   Neurological: He is  alert and oriented to person, place, and time. No cranial nerve deficit.   Skin: Skin is warm and dry. Capillary refill takes less than 2 seconds.       Significant Labs:  CBC:  Recent Labs   Lab 01/12/20 0311 01/13/20 0318 01/14/20 0336   WBC 9.59 8.00 7.44   RBC 4.87 4.91 5.35   HGB 12.7* 12.5* 13.6*   HCT 42.1 42.5 45.7    238 250   MCV 86 87 85   MCH 26.1* 25.5* 25.4*   MCHC 30.2* 29.4* 29.8*     BNP:  Recent Labs   Lab 01/11/20 2135 01/13/20 0318   * 1,032*     CMP:  Recent Labs   Lab 01/11/20 2135 01/13/20 0318 01/13/20  1245 01/13/20 2331 01/14/20 0336   *   < > 102  --  109 93   CALCIUM 9.9   < > 9.1  --  9.8 9.6   ALBUMIN 3.9  --  3.4*  --   --   --    PROT 7.7  --  6.9  --   --   --       < > 139  --  135* 138   K 3.4*   < > 3.8 4.7 3.8 3.2*   CO2 24   < > 28  --  29 33*   CL 97   < > 100  --  96 95   BUN 24*   < > 22*  --  21* 20   CREATININE 1.9*   < > 1.5*  --  1.6* 1.4   ALKPHOS 130  --  133  --   --   --    ALT 40  --  47*  --   --   --    AST 25  --  34  --   --   --    BILITOT 0.4  --  0.4  --   --   --     < > = values in this interval not displayed.      Coagulation:   Recent Labs   Lab 01/12/20 0311 01/13/20 0318 01/14/20 0336   INR 5.0* 5.2* 2.7*   APTT 43.4* 42.5* 36.2*     LDH:  Recent Labs   Lab 01/12/20 0311 01/13/20 0318 01/14/20 0336   * 327* 375*     Microbiology:  Microbiology Results (last 7 days)     ** No results found for the last 168 hours. **          I have reviewed all pertinent labs within the past 24 hours.    Estimated Creatinine Clearance: 82.6 mL/min (based on SCr of 1.4 mg/dL).    Diagnostic Results:  I have reviewed all pertinent imaging results/findings within the past 24 hours.

## 2020-01-14 NOTE — ASSESSMENT & PLAN NOTE
- underwent sedation + cardioversion in ER  - likely exacerbated due to acute decompensated heart failure in the setting of prednisone; will treat underlying etiology. Diuresing per primary team.  - continue IV amiodarone gtt till load completes, then PO amio 400mg x 2 weeks followed by 400mg daily. Discontinued lidocaine on 01/13.   - WCT ddx includes VT vs. SVT with aberrancy vs. PMT; most likely PMT. Decreased monitoring and tracking zones as mentioned above.  Changed settings from DDD -> VVI at 50 bpm given PMT on interrogation   - will continue to monitor on telemetry  - routine labs; goal K > 4, Mg > 2  - patient will require generator change with addition of new RV lead/dual coil (if it's not optimally apical, e.g., or one with an SVC coil) vs/+ azygous coil or CS coil. Likely will be completed prior to discharge once HD optimized. Since INR has improved (2.7 from 5), likely will occur Thursday, 1/16

## 2020-01-14 NOTE — ASSESSMENT & PLAN NOTE
- Likely due to amiodarone per endocrinology.  - US thyroid no nodules   - TSI and TPO ab pending  - Continue prednisone 60 mg, methimazole 60 mg BID  - Follow TFT's

## 2020-01-14 NOTE — SUBJECTIVE & OBJECTIVE
"Interval HPI:   Pt is AAOX4,  HR 80s, Tmax 97.9, FT4 3.91 today (Unchanged).  Pt clinically Euthyroid.  NO evidence of Thyroid storm.   Pt had Thyroid US yesterday showing thyromegaly and Normal blood flow which is suggestive of Type 2 amiodarone induce hyperthyroidism.     Overnight events: NINA   Eatin%  Nausea: No  Hypoglycemia and intervention: No  Fever: No  TPN and/or TF: No    BP (!) 94/57 (BP Location: Left arm, Patient Position: Lying)   Pulse 68   Temp 97.9 °F (36.6 °C) (Oral)   Resp 18   Ht 6' 1" (1.854 m)   Wt 119.3 kg (263 lb 0.1 oz)   SpO2 96%   BMI 34.70 kg/m²     Labs Reviewed and Include    Recent Labs   Lab 20  0336   GLU 93   CALCIUM 9.6      K 3.2*   CO2 33*   CL 95   BUN 20   CREATININE 1.4     Lab Results   Component Value Date    WBC 7.44 2020    HGB 13.6 (L) 2020    HCT 45.7 2020    MCV 85 2020     2020     Recent Labs   Lab 20  2135 20  0929   TSH <0.010* <0.010*   FREET4 3.91* 3.91*     Lab Results   Component Value Date    HGBA1C 5.5 2018       Nutritional status:   Body mass index is 34.7 kg/m².  Lab Results   Component Value Date    ALBUMIN 3.4 (L) 2020    ALBUMIN 3.9 2020    ALBUMIN 4.4 2019     Lab Results   Component Value Date    PREALBUMIN 24 2020    PREALBUMIN 40 2019    PREALBUMIN 27 2019       Estimated Creatinine Clearance: 82.6 mL/min (based on SCr of 1.4 mg/dL).    Accu-Checks  No results for input(s): POCTGLUCOSE in the last 72 hours.    Current Medications and/or Treatments Impacting Glycemic Control  Immunotherapy:    Immunosuppressants     None        Steroids:   Hormones (From admission, onward)    Start     Stop Route Frequency Ordered    20 0900  predniSONE tablet 60 mg      -- Oral Daily 20 0209        Pressors:    Autonomic Drugs (From admission, onward)    None        Hyperglycemia/Diabetes Medications:   Antihyperglycemics (From admission, " onward)    None

## 2020-01-14 NOTE — PROGRESS NOTES
01/14/2020  Fitz Christianson    Current provider:  Bettye Connors MD      I, Fitz Christianson, rounded on Tim Richards to ensure all mechanical assist device settings (IABP or VAD) were appropriate and all parameters were within limits.  I was able to ensure all back up equipment was present, the staff had no issues, and the Perfusion Department daily rounding was complete.    12:19 PM

## 2020-01-14 NOTE — ASSESSMENT & PLAN NOTE
-Pt on Amiodarone since 2012   -Currently on Amiodarone drip   -Likely Type 2 Amiodarone hyperthyroidism in the setting of chronic amiodarone and Negative Ab   -TRAb (-)   -Thyroid US from 1/13/20 Showing Thyromegaly and Normal vascularity which is suggestive of Type 2 Amiodarone induced hyperthyroidism   -No clinical evidence of Thyroid storm   -TSH < 0.01,   FT4 3.9  (Hyperthyroid likely 2/2 prednisone non compliance x 1 week)     Plan:   -C/w Prednisone 60mg PO daily   -C/w  Methimazole to 20mg BID   -Follow TSI and TPO Ab   -Repeat TFT in 48-72hrs       -Primary team concerned for Fluid overload 2/2 steroids    -Due to Type 2 thyroiditis (Treatment is limited to Steroids for antiinflammatory effects on the thyroid gland and Thyroidectomy)   Would recommend normalization of TFT with prednisone and Methimazole prior to surgical evaluation due to degree of hyperthyroidism and patient being a high risk surgical candidate due to advance cardiac disease.

## 2020-01-14 NOTE — ASSESSMENT & PLAN NOTE
- Continue furosemide infusion at 20 mg/hr for one more day, can likely de escalate tomorrow  - Check BMP BID

## 2020-01-14 NOTE — PROGRESS NOTES
[facility]  Cardiac Electrophysiology  Progress Note    Admission Date: 1/11/2020  Code Status: Full Code   Attending Physician: Bettye Connors MD   Expected Discharge Date: 1/17/2020  Principal Problem:<principal problem not specified>    Subjective:     Interval History: Overnight, at 11:20, had 3 beat run of NSVT followed by a 4 beat run of NSVT (after PVC) and a 18 second run of NSVT. During these episodes, patient reports that he experienced his heart racing, but denies that he was shocked. Otherwise, feels well this AM.     INR 2.7 today.     Review of Systems   All other systems reviewed and are negative.    Objective:     Vital Signs (Most Recent):  Temp: 97.9 °F (36.6 °C) (01/14/20 0700)  Pulse: 73 (01/14/20 0900)  Resp: 20 (01/14/20 0900)  BP: (!) 80/0 (01/14/20 0700)  SpO2: 96 % (01/14/20 0700) Vital Signs (24h Range):  Temp:  [97.9 °F (36.6 °C)-98.3 °F (36.8 °C)] 97.9 °F (36.6 °C)  Pulse:  [69-82] 73  Resp:  [18-32] 20  SpO2:  [96 %-98 %] 96 %  BP: (76-98)/(0-66) 80/0     Weight: 119.3 kg (263 lb 0.1 oz)  Body mass index is 34.7 kg/m².     SpO2: 96 %  O2 Device (Oxygen Therapy): room air    Physical Exam   Constitutional: He is oriented to person, place, and time. He appears well-developed and well-nourished.   HENT:   Head: Atraumatic.   Eyes: Pupils are equal, round, and reactive to light.   Neck: Neck supple. JVD present.   Cardiovascular: Normal rate and regular rhythm.   Pulmonary/Chest: Effort normal and breath sounds normal.   Abdominal: Soft.   Musculoskeletal:   1+ pitting edema bilaterally in LEs   Neurological: He is alert and oriented to person, place, and time.   Skin: Skin is warm and dry.   Psychiatric: He has a normal mood and affect.        Significant Labs:   EP:   Recent Labs   Lab 01/13/20  0318 01/13/20  1245 01/13/20  2331 01/14/20  0336     --  135* 138   K 3.8 4.7 3.8 3.2*     --  96 95   CO2 28  --  29 33*     --  109 93   BUN 22*  --  21*  20   CREATININE 1.5*  --  1.6* 1.4   CALCIUM 9.1  --  9.8 9.6   PROT 6.9  --   --   --    ALBUMIN 3.4*  --   --   --    BILITOT 0.4  --   --   --    ALKPHOS 133  --   --   --    AST 34  --   --   --    ALT 47*  --   --   --    ANIONGAP 11  --  10 10   ESTGFRAFRICA >60.0  --  56.0* >60.0   EGFRNONAA 52.4*  --  48.5* 57.0*   WBC 8.00  --   --  7.44   HGB 12.5*  --   --  13.6*   HCT 42.5  --   --  45.7     --   --  250   INR 5.2*  --   --  2.7*       Significant Imaging: Echocardiogram:   2D echo with color flow doppler:   Results for orders placed or performed during the hospital encounter of 07/18/18   2D Echo w/ Color Flow Doppler   Result Value Ref Range    QEF 15 (A) 55 - 65    Mitral Valve Regurgitation SEVERE (A)     Est. PA Systolic Pressure 33.6     Mitral Valve Mobility NORMAL     Tricuspid Valve Regurgitation MILD     Narrative    Date of Procedure: 07/18/2018        TEST DESCRIPTION   Technical Quality: This is a technically challenging study. There is poor endocardial definition.     General: A catheter is present in the right-sided cardiac chambers.     Aorta: The aortic root is normal in size, measuring 2.1 cm at sinotubular junction and 3.4 cm at Sinuses of Valsalva. The proximal ascending aorta is normal in size, measuring 3.6 cm across.     Left Atrium: The left atrial volume index is severely enlarged, measuring 73.67 cc/m2.     Left Ventricle: The left ventricle is severely enlarged, with an end-diastolic diameter of 8.3 cm, and an end-systolic diameter of 7.7 cm. LV wall thickness is normal, with the septum and the posterior wall each measuring 0.8 cm across. Relative wall   thickness was normal at 0.19, and the LV mass index was increased at 172.0 g/m2 consistent with eccentric left ventricular hypertrophy. There is global hypokinesis. Left ventricular systolic function appears severely depressed. Visually estimated   ejection fraction is 15-20%.         Right Atrium: The right atrium is  enlarged, measuring 6.8 cm in length and 5.1 cm in width in the apical view.     Right Ventricle: The right ventricle is normal in size measuring 3.4 cm at the base in the apical right ventricle-focused view. Global right ventricular systolic function appears moderately depressed. There is abnormal septal motion. Tricuspid annular   plane systolic excursion (TAPSE) is 1.2 cm. The estimated PA systolic pressure is 34 mmHg.     Aortic Valve:  The aortic valve is normal in structure.     Mitral Valve:  The mitral valve is normal in structure with normal leaflet mobility. There is severe mitral regurgitation.     Tricuspid Valve:  The tricuspid valve is normal in structure with normal leaflet mobility. There is mild tricuspid regurgitation.     Pulmonary Valve:  The pulmonic valve is normal in structure with normal leaflet mobility. There is trivial to mild pulmonic regurgitation.     IVC: IVC is enlarged but collapses > 50% with a sniff, suggesting intermediate right atrial pressure of 8 mmHg.     Intracavitary: There is no evidence of pericardial effusion, intracavity mass, thrombi, or vegetation.     Other: There is a left pleural effusion present.       LVAD: There is a HeartMate II LVAD in place. Inflow cannula is visualized. Outflow graft is not visualized. Baseline speed is 9400 rpms. The aortic valve opens intermittently. Septum is midline.       CONCLUSIONS     1 - Severely depressed left ventricular systolic function (EF 15-20%).     2 - Eccentric hypertrophy.     3 - Biatrial enlargement.     4 - Moderately depressed right ventricular systolic function .     5 - The estimated PA systolic pressure is 34 mmHg.     6 - Severe mitral regurgitation.     7 - Mild tricuspid regurgitation.     8 - Trivial to mild pulmonic regurgitation.     9 - Intermediate central venous pressure.     10 - Left pleural effusion.     11 - HeartMate II LVAD; speed 9400.             This document has been electronically    SIGNED BY:  Magi Delong MD On: 07/18/2018 17:10    and Transthoracic echo (TTE) complete (Cupid Only):   Results for orders placed or performed during the hospital encounter of 11/08/19   Echo Color Flow Doppler? Yes   Result Value Ref Range    Ascending aorta 2.83 cm    STJ 3.90 cm    IVS 1.00 0.6 - 1.1 cm    LA size 4.56 cm    Left Atrium Major Axis 7.08 cm    Left Atrium Minor Axis 6.79 cm    LVIDD 11.00 (A) 3.5 - 6.0 cm    LVIDS 9.31 (A) 2.1 - 4.0 cm    LVOT diameter 2.24 cm    PW 0.90 0.6 - 1.1 cm    MV Peak A Jorge 0.84 m/s    E wave decelartion time 124.12 msec    MV Peak E Jorge 0.94 m/s    RA Major Axis 5.36 cm    RA Width 4.62 cm    RVDD 3.61 cm    Sinus 3.37 cm    TAPSE 1.68 cm    TR Max Jorge 1.94 m/s    TDI LATERAL 0.07 m/s    TDI SEPTAL 0.10 m/s    LA WIDTH 5.03 cm    LV Diastolic Volume 538.72 mL    LV Systolic Volume 482.19 mL    LV LATERAL E/E' RATIO 13.43 m/s    LV SEPTAL E/E' RATIO 9.40 m/s    FS 15 %    LA volume 135.15 cm3    LV mass 679.25 g    Left Ventricle Relative Wall Thickness 0.16 cm    E/A ratio 1.12     Mean e' 0.09 m/s    LVOT area 3.9 cm2    E/E' ratio 11.06 m/s    LV Systolic Volume Index 199.8 mL/m2    LV Diastolic Volume Index 223.21 mL/m2    LA Volume Index 56.0 mL/m2    LV Mass Index 281 g/m2    Triscuspid Valve Regurgitation Peak Gradient 15 mmHg    BSA 2.47 m2    Right Atrial Pressure (from IVC) 3 mmHg    TV rest pulmonary artery pressure 18 mmHg    Narrative    · LVAD present. Base speed is 12211 RPMs. The pump type is a Heartmate II.  · The interventricular septum appears midline. The aortic valve does not   open.  · Severely decreased left ventricular systolic function. The estimated   ejection fraction is 10%  · Severe left ventricular enlargement. LV end diastolic dimension 11 cm.  · Eccentric left ventricular hypertrophy.  · Grade I (mild) left ventricular diastolic dysfunction consistent with   impaired relaxation.  · Mildly to moderately reduced right ventricular systolic  function.  · Severe left atrial enlargement.  · Moderate right atrial enlargement.  · Mild-to-moderate mitral regurgitation.  · Normal central venous pressure (3 mm Hg).  · The estimated PA systolic pressure is 18 mm Hg        Assessment and Plan:     Ventricular tachycardia  - underwent sedation + cardioversion in ER  - likely exacerbated due to acute decompensated heart failure in the setting of prednisone; will treat underlying etiology. Diuresing per primary team.  - continue IV amiodarone gtt till load completes, then PO amio 400mg x 2 weeks followed by 400mg daily. Discontinued lidocaine on 01/13.   - WCT ddx includes VT vs. SVT with aberrancy vs. PMT; most likely PMT. Decreased monitoring and tracking zones as mentioned above.  Changed settings from DDD -> VVI at 50 bpm given PMT on interrogation   - will continue to monitor on telemetry  - routine labs; goal K > 4, Mg > 2  - patient will require generator change with addition of new RV lead/dual coil (if it's not optimally apical, e.g., or one with an SVC coil) vs/+ azygous coil or CS coil. Likely will be completed prior to discharge once HD optimized. Since INR has improved (2.7 from 5), likely will occur Thursday, 1/16        Mallory Bowser MD  Cardiac Electrophysiology  [facility]

## 2020-01-14 NOTE — PROGRESS NOTES
[facility]  Heart Transplant  Progress Note    Patient Name: Tim Richards  MRN: [id number]  Admission Date: 1/11/2020  Hospital Length of Stay: 3 days  Attending Physician: Bettye Connors MD  Primary Care Provider: Primary Doctor No  Principal Problem:<principal problem not specified>    Subjective:     Interval History: One episode of NSVT last night, had some palpitations. He is net neg 5.9 L in the past 24 hours. Feels well this AM.     Continuous Infusions:   amiodarone in dextrose 5% 1 mg/min (01/14/20 1200)    furosemide (LASIX) 2 mg/mL infusion (non-titrating) 20 mg/hr (01/14/20 1200)     Scheduled Meds:   allopurinol  100 mg Oral Daily    amLODIPine  10 mg Oral Daily    doxazosin  8 mg Oral Q12H    ferrous gluconate  324 mg Oral Daily with breakfast    magnesium oxide  400 mg Oral TID    methIMAzole  20 mg Oral BID    pantoprazole  40 mg Oral Daily    predniSONE  60 mg Oral Daily    warfarin  2.5 mg Oral Daily     PRN Meds:acetaminophen, ondansetron, oxyCODONE    Review of patient's allergies indicates:   Allergen Reactions    Lisinopril Anaphylaxis     Objective:     Vital Signs (Most Recent):  Temp: 98.1 °F (36.7 °C) (01/14/20 1100)  Pulse: 68 (01/14/20 1200)  Resp: 18 (01/14/20 1200)  BP: (!) 78/0 (01/14/20 1100)  SpO2: 99 % (01/14/20 1100) Vital Signs (24h Range):  Temp:  [97.9 °F (36.6 °C)-98.3 °F (36.8 °C)] 98.1 °F (36.7 °C)  Pulse:  [68-82] 68  Resp:  [18-32] 18  SpO2:  [96 %-99 %] 99 %  BP: (78-98)/(0-66) 78/0     Patient Vitals for the past 72 hrs (Last 3 readings):   Weight   01/14/20 0400 119.3 kg (263 lb 0.1 oz)   01/13/20 0500 116.1 kg (256 lb 0.7 oz)   01/12/20 0400 121.5 kg (267 lb 13.7 oz)     Body mass index is 34.7 kg/m².      Intake/Output Summary (Last 24 hours) at 1/14/2020 1226  Last data filed at 1/14/2020 1200  Gross per 24 hour   Intake 1708.95 ml   Output 5940 ml   Net -4231.05 ml       Physical Exam   Constitutional: He is oriented to person,  place, and time. He appears well-developed.   Neck: JVD (8 cm H2O at 30 degrees) present.   Cardiovascular: Normal rate.   Murmur (VAD) heard.  Pulmonary/Chest: Effort normal and breath sounds normal.   Abdominal: Soft. Bowel sounds are normal.   Musculoskeletal: Normal range of motion. He exhibits no edema.   Neurological: He is alert and oriented to person, place, and time. No cranial nerve deficit.   Skin: Skin is warm and dry. Capillary refill takes less than 2 seconds.       Significant Labs:  CBC:  Recent Labs   Lab 01/12/20 0311 01/13/20 0318 01/14/20 0336   WBC 9.59 8.00 7.44   RBC 4.87 4.91 5.35   HGB 12.7* 12.5* 13.6*   HCT 42.1 42.5 45.7    238 250   MCV 86 87 85   MCH 26.1* 25.5* 25.4*   MCHC 30.2* 29.4* 29.8*     BNP:  Recent Labs   Lab 01/11/20 2135 01/13/20 0318   * 1,032*     CMP:  Recent Labs   Lab 01/11/20 2135 01/13/20 0318 01/13/20  1245 01/13/20  2331 01/14/20 0336   *   < > 102  --  109 93   CALCIUM 9.9   < > 9.1  --  9.8 9.6   ALBUMIN 3.9  --  3.4*  --   --   --    PROT 7.7  --  6.9  --   --   --       < > 139  --  135* 138   K 3.4*   < > 3.8 4.7 3.8 3.2*   CO2 24   < > 28  --  29 33*   CL 97   < > 100  --  96 95   BUN 24*   < > 22*  --  21* 20   CREATININE 1.9*   < > 1.5*  --  1.6* 1.4   ALKPHOS 130  --  133  --   --   --    ALT 40  --  47*  --   --   --    AST 25  --  34  --   --   --    BILITOT 0.4  --  0.4  --   --   --     < > = values in this interval not displayed.      Coagulation:   Recent Labs   Lab 01/12/20 0311 01/13/20 0318 01/14/20 0336   INR 5.0* 5.2* 2.7*   APTT 43.4* 42.5* 36.2*     LDH:  Recent Labs   Lab 01/12/20 0311 01/13/20 0318 01/14/20 0336   * 327* 375*     Microbiology:  Microbiology Results (last 7 days)     ** No results found for the last 168 hours. **          I have reviewed all pertinent labs within the past 24 hours.    Estimated Creatinine Clearance: 82.6 mL/min (based on SCr of 1.4 mg/dL).    Diagnostic  Results:  I have reviewed all pertinent imaging results/findings within the past 24 hours.    Assessment and Plan:     Tim Richards is a 54 y.o. M with DCM,(EF 10%, grade III DD) s/p HM II with Outflow graft changed (03/9/18) as DT, BiV-Icd [employer] (2014), HtN, obesity transferred from  for VT now in VF, shocked by device x 7.  Has been taking prednisone for hyperthyroidism which has caused him to eat/retain fluid and gain 13lbs as of late.  On exam he is decompensated, k 3.4,  higher than prior, creat 1.9 around baseline, INR 5.3.     He was admitted to the hospital in 10/2019 with similar situation 2/2 ICD shocks. MMVT -- multiple rounds of ATP--degenerated the MMVT into VF and the he was unsuccessfully shocked multiple times . He was eventually shocked externally to convert into sinus rhythm .  It was postulated reasons for ineffective therapy (had worsening hypervolemia week prior, length of time in the arrhythmia prior to ICD shock, IV amiodarone causing higher defibrillatory tissue threshold, or change in heart-shock vector post LVAD implant). He had a NIPS which showed successful DFT at 35J. He was discharge after being diuresed. Device check noted on 12/14/19 - one episode of MMVT 270 ms that was appropriately successfully shocked with multiple episodes of non sustained slower VT. Of note he had a monitoring zone added during his admission at 133. VT 1 zone at 167 -- 6 rounds of ATP added before shock in that zone.      TTE demonstrated EF >10%, AV does not open, IVF dilated 2.5cm with respiratory variation >50%, Dilated RV / LV.    Hyperthyroidism  - Likely due to amiodarone per endocrinology.  - US thyroid no nodules   - TSI and TPO ab pending  - Continue prednisone 60 mg, methimazole 60 mg BID  - Follow TFT's    Presence of left ventricular assist device (LVAD)  Anticoagulation - restarting warfarin today  Procedure: Device Interrogation Including analysis of device parameters  Current  Settings: Ventricular Assist Device  Review of device function is stable/unstable stable    TXP LVAD INTERROGATIONS 1/14/2020 1/14/2020 1/14/2020 1/14/2020 1/14/2020 1/14/2020 1/14/2020   Type HeartMate II HeartMate II HeartMate II HeartMate II HeartMate II HeartMate II HeartMate II   Flow 5.5 4.7 5.7 5.2 5.1 5.4 4.8   Speed 75741 09835 86593 93449 30615 82389 77862   PI 3 2.7 3.4 3.5 3.7 3.4 3.6   Power (Jackson) 6.8 6.2 6.8 6.5 6.7 6.7 6.4   LSL 9600 9600 9600 9600 9600 9600 9600   Pulsatility - - - - - - -       Ventricular tachycardia  - Underwent sedation +external shock in ER  - Likely exacerbated due to acute decompensated heart failure and hyperthyroidism; will treat underlying etiology  - Continue IV amiodarone for now at 1 mg/min per EP recs  - EP on board, ICD settings adjusted   - Would like to hold off on procedures until TFT's normalize    Hypertension  - Continue amlodipine and doxazosin     Acute on chronic systolic congestive heart failure  - Continue furosemide infusion at 20 mg/hr for one more day, can likely de escalate tomorrow  - Check BMP BID        Sanya Zavala MD  Heart Transplant  [facility]

## 2020-01-14 NOTE — ASSESSMENT & PLAN NOTE
Anticoagulation - restarting warfarin today  Procedure: Device Interrogation Including analysis of device parameters  Current Settings: Ventricular Assist Device  Review of device function is stable/unstable stable    TXP LVAD INTERROGATIONS 1/14/2020 1/14/2020 1/14/2020 1/14/2020 1/14/2020 1/14/2020 1/14/2020   Type HeartMate II HeartMate II HeartMate II HeartMate II HeartMate II HeartMate II HeartMate II   Flow 5.5 4.7 5.7 5.2 5.1 5.4 4.8   Speed 34074 60148 34229 41295 92302 06042 89053   PI 3 2.7 3.4 3.5 3.7 3.4 3.6   Power (Jackson) 6.8 6.2 6.8 6.5 6.7 6.7 6.4   LSL 9600 9600 9600 9600 9600 9600 9600   Pulsatility - - - - - - -

## 2020-01-14 NOTE — SUBJECTIVE & OBJECTIVE
Interval History: Overnight, at 11:20, had 3 beat run of NSVT followed by a 4 beat run of NSVT (after PVC) and a 18 second run of NSVT. During these episodes, patient reports that he experienced his heart racing, but denies that he was shocked. Otherwise, feels well this AM.     INR 2.7 today.     Review of Systems   All other systems reviewed and are negative.    Objective:     Vital Signs (Most Recent):  Temp: 97.9 °F (36.6 °C) (01/14/20 0700)  Pulse: 73 (01/14/20 0900)  Resp: 20 (01/14/20 0900)  BP: (!) 80/0 (01/14/20 0700)  SpO2: 96 % (01/14/20 0700) Vital Signs (24h Range):  Temp:  [97.9 °F (36.6 °C)-98.3 °F (36.8 °C)] 97.9 °F (36.6 °C)  Pulse:  [69-82] 73  Resp:  [18-32] 20  SpO2:  [96 %-98 %] 96 %  BP: (76-98)/(0-66) 80/0     Weight: 119.3 kg (263 lb 0.1 oz)  Body mass index is 34.7 kg/m².     SpO2: 96 %  O2 Device (Oxygen Therapy): room air    Physical Exam   Constitutional: He is oriented to person, place, and time. He appears well-developed and well-nourished.   HENT:   Head: Atraumatic.   Eyes: Pupils are equal, round, and reactive to light.   Neck: Neck supple. JVD present.   Cardiovascular: Normal rate and regular rhythm.   Pulmonary/Chest: Effort normal and breath sounds normal.   Abdominal: Soft.   Musculoskeletal:   1+ pitting edema bilaterally in LEs   Neurological: He is alert and oriented to person, place, and time.   Skin: Skin is warm and dry.   Psychiatric: He has a normal mood and affect.        Significant Labs:   EP:   Recent Labs   Lab 01/13/20  0318 01/13/20  1245 01/13/20  2331 01/14/20  0336     --  135* 138   K 3.8 4.7 3.8 3.2*     --  96 95   CO2 28  --  29 33*     --  109 93   BUN 22*  --  21* 20   CREATININE 1.5*  --  1.6* 1.4   CALCIUM 9.1  --  9.8 9.6   PROT 6.9  --   --   --    ALBUMIN 3.4*  --   --   --    BILITOT 0.4  --   --   --    ALKPHOS 133  --   --   --    AST 34  --   --   --    ALT 47*  --   --   --    ANIONGAP 11  --  10 10   ESTGFRAFRICA >60.0  --   56.0* >60.0   EGFRNONAA 52.4*  --  48.5* 57.0*   WBC 8.00  --   --  7.44   HGB 12.5*  --   --  13.6*   HCT 42.5  --   --  45.7     --   --  250   INR 5.2*  --   --  2.7*       Significant Imaging: Echocardiogram:   2D echo with color flow doppler:   Results for orders placed or performed during the hospital encounter of 07/18/18   2D Echo w/ Color Flow Doppler   Result Value Ref Range    QEF 15 (A) 55 - 65    Mitral Valve Regurgitation SEVERE (A)     Est. PA Systolic Pressure 33.6     Mitral Valve Mobility NORMAL     Tricuspid Valve Regurgitation MILD     Narrative    Date of Procedure: 07/18/2018        TEST DESCRIPTION   Technical Quality: This is a technically challenging study. There is poor endocardial definition.     General: A catheter is present in the right-sided cardiac chambers.     Aorta: The aortic root is normal in size, measuring 2.1 cm at sinotubular junction and 3.4 cm at Sinuses of Valsalva. The proximal ascending aorta is normal in size, measuring 3.6 cm across.     Left Atrium: The left atrial volume index is severely enlarged, measuring 73.67 cc/m2.     Left Ventricle: The left ventricle is severely enlarged, with an end-diastolic diameter of 8.3 cm, and an end-systolic diameter of 7.7 cm. LV wall thickness is normal, with the septum and the posterior wall each measuring 0.8 cm across. Relative wall   thickness was normal at 0.19, and the LV mass index was increased at 172.0 g/m2 consistent with eccentric left ventricular hypertrophy. There is global hypokinesis. Left ventricular systolic function appears severely depressed. Visually estimated   ejection fraction is 15-20%.         Right Atrium: The right atrium is enlarged, measuring 6.8 cm in length and 5.1 cm in width in the apical view.     Right Ventricle: The right ventricle is normal in size measuring 3.4 cm at the base in the apical right ventricle-focused view. Global right ventricular systolic function appears moderately  depressed. There is abnormal septal motion. Tricuspid annular   plane systolic excursion (TAPSE) is 1.2 cm. The estimated PA systolic pressure is 34 mmHg.     Aortic Valve:  The aortic valve is normal in structure.     Mitral Valve:  The mitral valve is normal in structure with normal leaflet mobility. There is severe mitral regurgitation.     Tricuspid Valve:  The tricuspid valve is normal in structure with normal leaflet mobility. There is mild tricuspid regurgitation.     Pulmonary Valve:  The pulmonic valve is normal in structure with normal leaflet mobility. There is trivial to mild pulmonic regurgitation.     IVC: IVC is enlarged but collapses > 50% with a sniff, suggesting intermediate right atrial pressure of 8 mmHg.     Intracavitary: There is no evidence of pericardial effusion, intracavity mass, thrombi, or vegetation.     Other: There is a left pleural effusion present.       LVAD: There is a HeartMate II LVAD in place. Inflow cannula is visualized. Outflow graft is not visualized. Baseline speed is 9400 rpms. The aortic valve opens intermittently. Septum is midline.       CONCLUSIONS     1 - Severely depressed left ventricular systolic function (EF 15-20%).     2 - Eccentric hypertrophy.     3 - Biatrial enlargement.     4 - Moderately depressed right ventricular systolic function .     5 - The estimated PA systolic pressure is 34 mmHg.     6 - Severe mitral regurgitation.     7 - Mild tricuspid regurgitation.     8 - Trivial to mild pulmonic regurgitation.     9 - Intermediate central venous pressure.     10 - Left pleural effusion.     11 - HeartMate II LVAD; speed 9400.             This document has been electronically    SIGNED BY: Magi Delong MD On: 07/18/2018 17:10    and Transthoracic echo (TTE) complete (Cupid Only):   Results for orders placed or performed during the hospital encounter of 11/08/19   Echo Color Flow Doppler? Yes   Result Value Ref Range    Ascending aorta 2.83 cm    STJ  3.90 cm    IVS 1.00 0.6 - 1.1 cm    LA size 4.56 cm    Left Atrium Major Axis 7.08 cm    Left Atrium Minor Axis 6.79 cm    LVIDD 11.00 (A) 3.5 - 6.0 cm    LVIDS 9.31 (A) 2.1 - 4.0 cm    LVOT diameter 2.24 cm    PW 0.90 0.6 - 1.1 cm    MV Peak A Jorge 0.84 m/s    E wave decelartion time 124.12 msec    MV Peak E Jorge 0.94 m/s    RA Major Axis 5.36 cm    RA Width 4.62 cm    RVDD 3.61 cm    Sinus 3.37 cm    TAPSE 1.68 cm    TR Max Jorge 1.94 m/s    TDI LATERAL 0.07 m/s    TDI SEPTAL 0.10 m/s    LA WIDTH 5.03 cm    LV Diastolic Volume 538.72 mL    LV Systolic Volume 482.19 mL    LV LATERAL E/E' RATIO 13.43 m/s    LV SEPTAL E/E' RATIO 9.40 m/s    FS 15 %    LA volume 135.15 cm3    LV mass 679.25 g    Left Ventricle Relative Wall Thickness 0.16 cm    E/A ratio 1.12     Mean e' 0.09 m/s    LVOT area 3.9 cm2    E/E' ratio 11.06 m/s    LV Systolic Volume Index 199.8 mL/m2    LV Diastolic Volume Index 223.21 mL/m2    LA Volume Index 56.0 mL/m2    LV Mass Index 281 g/m2    Triscuspid Valve Regurgitation Peak Gradient 15 mmHg    BSA 2.47 m2    Right Atrial Pressure (from IVC) 3 mmHg    TV rest pulmonary artery pressure 18 mmHg    Narrative    · LVAD present. [place] 41760 RPMs. The pump type is a Heartmate II.  · The interventricular septum appears midline. The aortic valve does not   open.  · Severely decreased left ventricular systolic function. The estimated   ejection fraction is 10%  · Severe left ventricular enlargement. LV end diastolic dimension 11 cm.  · Eccentric left ventricular hypertrophy.  · Grade I (mild) left ventricular diastolic dysfunction consistent with   impaired relaxation.  · Mildly to moderately reduced right ventricular systolic function.  · Severe left atrial enlargement.  · Moderate right atrial enlargement.  · Mild-to-moderate mitral regurgitation.  · Normal central venous pressure (3 mm Hg).  · The estimated PA systolic pressure is 18 mm Hg

## 2020-01-15 LAB
ALBUMIN SERPL BCP-MCNC: 3.5 G/DL (ref 3.5–5.2)
ALP SERPL-CCNC: 135 U/L (ref 55–135)
ALT SERPL W/O P-5'-P-CCNC: 38 U/L (ref 10–44)
ANION GAP SERPL CALC-SCNC: 11 MMOL/L (ref 8–16)
ANION GAP SERPL CALC-SCNC: 12 MMOL/L (ref 8–16)
APTT BLDCRRT: 30.8 SEC (ref 21–32)
AST SERPL-CCNC: 30 U/L (ref 10–40)
BASOPHILS # BLD AUTO: 0.01 K/UL (ref 0–0.2)
BASOPHILS NFR BLD: 0.1 % (ref 0–1.9)
BILIRUB DIRECT SERPL-MCNC: 0.2 MG/DL (ref 0.1–0.3)
BILIRUB SERPL-MCNC: 0.4 MG/DL (ref 0.1–1)
BNP SERPL-MCNC: 551 PG/ML (ref 0–99)
BUN SERPL-MCNC: 23 MG/DL (ref 6–20)
BUN SERPL-MCNC: 24 MG/DL (ref 6–20)
CALCIUM SERPL-MCNC: 9.6 MG/DL (ref 8.7–10.5)
CALCIUM SERPL-MCNC: 9.6 MG/DL (ref 8.7–10.5)
CHLORIDE SERPL-SCNC: 94 MMOL/L (ref 95–110)
CHLORIDE SERPL-SCNC: 95 MMOL/L (ref 95–110)
CO2 SERPL-SCNC: 26 MMOL/L (ref 23–29)
CO2 SERPL-SCNC: 30 MMOL/L (ref 23–29)
CREAT SERPL-MCNC: 1.6 MG/DL (ref 0.5–1.4)
CREAT SERPL-MCNC: 1.7 MG/DL (ref 0.5–1.4)
CRP SERPL-MCNC: 10.4 MG/L (ref 0–8.2)
DIFFERENTIAL METHOD: ABNORMAL
EOSINOPHIL # BLD AUTO: 0 K/UL (ref 0–0.5)
EOSINOPHIL NFR BLD: 0.1 % (ref 0–8)
ERYTHROCYTE [DISTWIDTH] IN BLOOD BY AUTOMATED COUNT: 14.3 % (ref 11.5–14.5)
EST. GFR  (AFRICAN AMERICAN): 52.1 ML/MIN/1.73 M^2
EST. GFR  (AFRICAN AMERICAN): 56 ML/MIN/1.73 M^2
EST. GFR  (NON AFRICAN AMERICAN): 45 ML/MIN/1.73 M^2
EST. GFR  (NON AFRICAN AMERICAN): 48.5 ML/MIN/1.73 M^2
GLUCOSE SERPL-MCNC: 111 MG/DL (ref 70–110)
GLUCOSE SERPL-MCNC: 198 MG/DL (ref 70–110)
HCT VFR BLD AUTO: 46.1 % (ref 40–54)
HGB BLD-MCNC: 14 G/DL (ref 14–18)
IMM GRANULOCYTES # BLD AUTO: 0.08 K/UL (ref 0–0.04)
IMM GRANULOCYTES NFR BLD AUTO: 0.9 % (ref 0–0.5)
INR PPP: 1.8 (ref 0.8–1.2)
LDH SERPL L TO P-CCNC: 423 U/L (ref 110–260)
LYMPHOCYTES # BLD AUTO: 0.8 K/UL (ref 1–4.8)
LYMPHOCYTES NFR BLD: 8.6 % (ref 18–48)
MAGNESIUM SERPL-MCNC: 2.1 MG/DL (ref 1.6–2.6)
MCH RBC QN AUTO: 25.4 PG (ref 27–31)
MCHC RBC AUTO-ENTMCNC: 30.4 G/DL (ref 32–36)
MCV RBC AUTO: 84 FL (ref 82–98)
MONOCYTES # BLD AUTO: 0.9 K/UL (ref 0.3–1)
MONOCYTES NFR BLD: 10.1 % (ref 4–15)
NEUTROPHILS # BLD AUTO: 7.1 K/UL (ref 1.8–7.7)
NEUTROPHILS NFR BLD: 80.2 % (ref 38–73)
NRBC BLD-RTO: 0 /100 WBC
PHOSPHATE SERPL-MCNC: 3.8 MG/DL (ref 2.7–4.5)
PLATELET # BLD AUTO: 248 K/UL (ref 150–350)
PMV BLD AUTO: 10.6 FL (ref 9.2–12.9)
POTASSIUM SERPL-SCNC: 3.7 MMOL/L (ref 3.5–5.1)
POTASSIUM SERPL-SCNC: 4.6 MMOL/L (ref 3.5–5.1)
PREALB SERPL-MCNC: 28 MG/DL (ref 20–43)
PROT SERPL-MCNC: 7.4 G/DL (ref 6–8.4)
PROTHROMBIN TIME: 17.5 SEC (ref 9–12.5)
RBC # BLD AUTO: 5.51 M/UL (ref 4.6–6.2)
SODIUM SERPL-SCNC: 131 MMOL/L (ref 136–145)
SODIUM SERPL-SCNC: 137 MMOL/L (ref 136–145)
TSH RECEP AB SER-ACNC: <1 IU/L (ref 0–1.75)
TSI SER-ACNC: <0.1 IU/L
WBC # BLD AUTO: 8.8 K/UL (ref 3.9–12.7)

## 2020-01-15 PROCEDURE — 93750 INTERROGATION VAD IN PERSON: CPT | Mod: ,,, | Performed by: INTERNAL MEDICINE

## 2020-01-15 PROCEDURE — 25000003 PHARM REV CODE 250: Performed by: INTERNAL MEDICINE

## 2020-01-15 PROCEDURE — 84134 ASSAY OF PREALBUMIN: CPT

## 2020-01-15 PROCEDURE — 99233 SBSQ HOSP IP/OBS HIGH 50: CPT | Mod: ,,, | Performed by: INTERNAL MEDICINE

## 2020-01-15 PROCEDURE — 83615 LACTATE (LD) (LDH) ENZYME: CPT

## 2020-01-15 PROCEDURE — 93010 EKG 12-LEAD: ICD-10-PCS | Mod: ,,, | Performed by: INTERNAL MEDICINE

## 2020-01-15 PROCEDURE — 99233 PR SUBSEQUENT HOSPITAL CARE,LEVL III: ICD-10-PCS | Mod: ,,, | Performed by: INTERNAL MEDICINE

## 2020-01-15 PROCEDURE — 83735 ASSAY OF MAGNESIUM: CPT

## 2020-01-15 PROCEDURE — 25000003 PHARM REV CODE 250: Performed by: STUDENT IN AN ORGANIZED HEALTH CARE EDUCATION/TRAINING PROGRAM

## 2020-01-15 PROCEDURE — 85610 PROTHROMBIN TIME: CPT

## 2020-01-15 PROCEDURE — 84100 ASSAY OF PHOSPHORUS: CPT

## 2020-01-15 PROCEDURE — 93750 PR INTERROGATE VENT ASSIST DEV, IN PERSON, W PHYSICIAN ANALYSIS: ICD-10-PCS | Mod: ,,, | Performed by: INTERNAL MEDICINE

## 2020-01-15 PROCEDURE — 27000248 HC VAD-ADDITIONAL DAY

## 2020-01-15 PROCEDURE — 85025 COMPLETE CBC W/AUTO DIFF WBC: CPT

## 2020-01-15 PROCEDURE — 94761 N-INVAS EAR/PLS OXIMETRY MLT: CPT

## 2020-01-15 PROCEDURE — 63600175 PHARM REV CODE 636 W HCPCS: Performed by: INTERNAL MEDICINE

## 2020-01-15 PROCEDURE — 27000685 HC LVAD KIT (30 DAY SUPPLY)

## 2020-01-15 PROCEDURE — 93005 ELECTROCARDIOGRAM TRACING: CPT

## 2020-01-15 PROCEDURE — 83880 ASSAY OF NATRIURETIC PEPTIDE: CPT

## 2020-01-15 PROCEDURE — 86140 C-REACTIVE PROTEIN: CPT

## 2020-01-15 PROCEDURE — 63600175 PHARM REV CODE 636 W HCPCS: Performed by: STUDENT IN AN ORGANIZED HEALTH CARE EDUCATION/TRAINING PROGRAM

## 2020-01-15 PROCEDURE — 85730 THROMBOPLASTIN TIME PARTIAL: CPT

## 2020-01-15 PROCEDURE — 25000003 PHARM REV CODE 250: Performed by: GENERAL ACUTE CARE HOSPITAL

## 2020-01-15 PROCEDURE — 93010 ELECTROCARDIOGRAM REPORT: CPT | Mod: ,,, | Performed by: INTERNAL MEDICINE

## 2020-01-15 PROCEDURE — 20000000 HC ICU ROOM

## 2020-01-15 PROCEDURE — 80076 HEPATIC FUNCTION PANEL: CPT

## 2020-01-15 PROCEDURE — 80048 BASIC METABOLIC PNL TOTAL CA: CPT | Mod: 91

## 2020-01-15 RX ORDER — WARFARIN SODIUM 5 MG/1
5 TABLET ORAL DAILY
Status: DISCONTINUED | OUTPATIENT
Start: 2020-01-15 | End: 2020-01-20 | Stop reason: HOSPADM

## 2020-01-15 RX ORDER — TALC
6 POWDER (GRAM) TOPICAL NIGHTLY PRN
Status: DISCONTINUED | OUTPATIENT
Start: 2020-01-15 | End: 2020-01-20 | Stop reason: HOSPADM

## 2020-01-15 RX ORDER — POTASSIUM CHLORIDE 20 MEQ/1
40 TABLET, EXTENDED RELEASE ORAL ONCE
Status: COMPLETED | OUTPATIENT
Start: 2020-01-15 | End: 2020-01-15

## 2020-01-15 RX ORDER — FUROSEMIDE 10 MG/ML
80 INJECTION INTRAMUSCULAR; INTRAVENOUS 2 TIMES DAILY
Status: DISCONTINUED | OUTPATIENT
Start: 2020-01-15 | End: 2020-01-16

## 2020-01-15 RX ORDER — POLYETHYLENE GLYCOL 3350 17 G/17G
17 POWDER, FOR SOLUTION ORAL DAILY PRN
Status: DISCONTINUED | OUTPATIENT
Start: 2020-01-15 | End: 2020-01-16

## 2020-01-15 RX ADMIN — DOCUSATE SODIUM 50 MG: 50 CAPSULE, LIQUID FILLED ORAL at 02:01

## 2020-01-15 RX ADMIN — FUROSEMIDE 20 MG/HR: 10 INJECTION, SOLUTION INTRAMUSCULAR; INTRAVENOUS at 05:01

## 2020-01-15 RX ADMIN — DOXAZOSIN 8 MG: 4 TABLET ORAL at 08:01

## 2020-01-15 RX ADMIN — POTASSIUM CHLORIDE 40 MEQ: 1500 TABLET, EXTENDED RELEASE ORAL at 05:01

## 2020-01-15 RX ADMIN — AMIODARONE HYDROCHLORIDE 1 MG/MIN: 1.8 INJECTION, SOLUTION INTRAVENOUS at 08:01

## 2020-01-15 RX ADMIN — PREDNISONE 60 MG: 20 TABLET ORAL at 08:01

## 2020-01-15 RX ADMIN — AMLODIPINE BESYLATE 10 MG: 10 TABLET ORAL at 02:01

## 2020-01-15 RX ADMIN — AMIODARONE HYDROCHLORIDE 1 MG/MIN: 1.8 INJECTION, SOLUTION INTRAVENOUS at 01:01

## 2020-01-15 RX ADMIN — OXYCODONE HYDROCHLORIDE 10 MG: 10 TABLET ORAL at 09:01

## 2020-01-15 RX ADMIN — FERROUS GLUCONATE TAB 324 MG (37.5 MG ELEMENTAL IRON) 324 MG: 324 (37.5 FE) TAB at 08:01

## 2020-01-15 RX ADMIN — AMIODARONE HYDROCHLORIDE 1 MG/MIN: 1.8 INJECTION, SOLUTION INTRAVENOUS at 09:01

## 2020-01-15 RX ADMIN — Medication 6 MG: at 12:01

## 2020-01-15 RX ADMIN — METHIMAZOLE 20 MG: 10 TABLET ORAL at 08:01

## 2020-01-15 RX ADMIN — Medication 400 MG: at 08:01

## 2020-01-15 RX ADMIN — AMIODARONE HYDROCHLORIDE 1 MG/MIN: 1.8 INJECTION, SOLUTION INTRAVENOUS at 03:01

## 2020-01-15 RX ADMIN — Medication 400 MG: at 02:01

## 2020-01-15 RX ADMIN — PANTOPRAZOLE SODIUM 40 MG: 40 TABLET, DELAYED RELEASE ORAL at 08:01

## 2020-01-15 RX ADMIN — WARFARIN SODIUM 5 MG: 5 TABLET ORAL at 05:01

## 2020-01-15 RX ADMIN — POLYETHYLENE GLYCOL 3350 17 G: 17 POWDER, FOR SOLUTION ORAL at 02:01

## 2020-01-15 RX ADMIN — FUROSEMIDE 80 MG: 10 INJECTION, SOLUTION INTRAMUSCULAR; INTRAVENOUS at 05:01

## 2020-01-15 RX ADMIN — ALLOPURINOL 100 MG: 100 TABLET ORAL at 08:01

## 2020-01-15 NOTE — PROGRESS NOTES
[facility]  Cardiac Electrophysiology  Progress Note    Admission Date: 1/11/2020  Code Status: Full Code   Attending Physician: Bettye Connors MD   Expected Discharge Date: 1/17/2020  Principal Problem:<principal problem not specified>    Subjective:     Interval History: No events on telemetry overnight. Patient reports that he is less short of breath (diuresing well). Denies chest pain, palpitations.     Review of Systems   All other systems reviewed and are negative.    Objective:     Vital Signs (Most Recent):  Temp: 97.6 °F (36.4 °C) (01/15/20 0701)  Pulse: 68 (01/15/20 0900)  Resp: (!) 23 (01/15/20 0900)  BP: (!) 82/0 (01/15/20 0701)  SpO2: 98 % (01/15/20 0701) Vital Signs (24h Range):  Temp:  [97.6 °F (36.4 °C)-98.6 °F (37 °C)] 97.6 °F (36.4 °C)  Pulse:  [63-91] 68  Resp:  [10-40] 23  SpO2:  [97 %-99 %] 98 %  BP: (78-96)/(0-60) 82/0     Weight: 119.3 kg (263 lb 0.1 oz)  Body mass index is 34.7 kg/m².     SpO2: 98 %  O2 Device (Oxygen Therapy): room air    Physical Exam   Constitutional: He is oriented to person, place, and time. He appears well-developed and well-nourished.   HENT:   Head: Normocephalic.   Eyes: Pupils are equal, round, and reactive to light.   Neck: Neck supple.   Neck veins are flat   Cardiovascular:   Smooth LVAD hum present   Pulmonary/Chest: Effort normal and breath sounds normal.   Abdominal: Soft.   Musculoskeletal: He exhibits edema.   Neurological: He is alert and oriented to person, place, and time.   Skin: Skin is warm.   Psychiatric: He has a normal mood and affect.       Significant Labs:   EP:   Recent Labs   Lab 01/14/20  0336 01/14/20  1716 01/15/20  0345    132* 137   K 3.2* 4.2 3.7   CL 95 94* 95   CO2 33* 29 30*   GLU 93 157* 111*   BUN 20 23* 23*   CREATININE 1.4 1.7* 1.6*   CALCIUM 9.6 9.5 9.6   PROT  --   --  7.4   ALBUMIN  --   --  3.5   BILITOT  --   --  0.4   ALKPHOS  --   --  135   AST  --   --  30   ALT  --   --  38   ANIONGAP 10 9 12    ESTGFRAFRICA >60.0 52.1* 56.0*   EGFRNONAA 57.0* 45.0* 48.5*   WBC 7.44  --  8.80   HGB 13.6*  --  14.0   HCT 45.7  --  46.1     --  248   INR 2.7*  --  1.8*       Significant Imaging: Echocardiogram:   2D echo with color flow doppler:   Results for orders placed or performed during the hospital encounter of 07/18/18   2D Echo w/ Color Flow Doppler   Result Value Ref Range    QEF 15 (A) 55 - 65    Mitral Valve Regurgitation SEVERE (A)     Est. PA Systolic Pressure 33.6     Mitral Valve Mobility NORMAL     Tricuspid Valve Regurgitation MILD     Narrative    Date of Procedure: 07/18/2018        TEST DESCRIPTION   Technical Quality: This is a technically challenging study. There is poor endocardial definition.     General: A catheter is present in the right-sided cardiac chambers.     Aorta: The aortic root is normal in size, measuring 2.1 cm at sinotubular junction and 3.4 cm at Sinuses of Valsalva. The proximal ascending aorta is normal in size, measuring 3.6 cm across.     Left Atrium: The left atrial volume index is severely enlarged, measuring 73.67 cc/m2.     Left Ventricle: The left ventricle is severely enlarged, with an end-diastolic diameter of 8.3 cm, and an end-systolic diameter of 7.7 cm. LV wall thickness is normal, with the septum and the posterior wall each measuring 0.8 cm across. Relative wall   thickness was normal at 0.19, and the LV mass index was increased at 172.0 g/m2 consistent with eccentric left ventricular hypertrophy. There is global hypokinesis. Left ventricular systolic function appears severely depressed. Visually estimated   ejection fraction is 15-20%.         Right Atrium: The right atrium is enlarged, measuring 6.8 cm in length and 5.1 cm in width in the apical view.     Right Ventricle: The right ventricle is normal in size measuring 3.4 cm at the base in the apical right ventricle-focused view. Global right ventricular systolic function appears moderately depressed. There  is abnormal septal motion. Tricuspid annular   plane systolic excursion (TAPSE) is 1.2 cm. The estimated PA systolic pressure is 34 mmHg.     Aortic Valve:  The aortic valve is normal in structure.     Mitral Valve:  The mitral valve is normal in structure with normal leaflet mobility. There is severe mitral regurgitation.     Tricuspid Valve:  The tricuspid valve is normal in structure with normal leaflet mobility. There is mild tricuspid regurgitation.     Pulmonary Valve:  The pulmonic valve is normal in structure with normal leaflet mobility. There is trivial to mild pulmonic regurgitation.     IVC: IVC is enlarged but collapses > 50% with a sniff, suggesting intermediate right atrial pressure of 8 mmHg.     Intracavitary: There is no evidence of pericardial effusion, intracavity mass, thrombi, or vegetation.     Other: There is a left pleural effusion present.       LVAD: There is a HeartMate II LVAD in place. Inflow cannula is visualized. Outflow graft is not visualized. Baseline speed is 9400 rpms. The aortic valve opens intermittently. Septum is midline.       CONCLUSIONS     1 - Severely depressed left ventricular systolic function (EF 15-20%).     2 - Eccentric hypertrophy.     3 - Biatrial enlargement.     4 - Moderately depressed right ventricular systolic function .     5 - The estimated PA systolic pressure is 34 mmHg.     6 - Severe mitral regurgitation.     7 - Mild tricuspid regurgitation.     8 - Trivial to mild pulmonic regurgitation.     9 - Intermediate central venous pressure.     10 - Left pleural effusion.     11 - HeartMate II LVAD; speed 9400.             This document has been electronically    SIGNED BY: Magi Delong MD On: 07/18/2018 17:10    and Transthoracic echo (TTE) complete (Cupid Only):   Results for orders placed or performed during the hospital encounter of 11/08/19   Echo Color Flow Doppler? Yes   Result Value Ref Range    Ascending aorta 2.83 cm    STJ 3.90 cm    IVS 1.00  0.6 - 1.1 cm    LA size 4.56 cm    Left Atrium Major Axis 7.08 cm    Left Atrium Minor Axis 6.79 cm    LVIDD 11.00 (A) 3.5 - 6.0 cm    LVIDS 9.31 (A) 2.1 - 4.0 cm    LVOT diameter 2.24 cm    PW 0.90 0.6 - 1.1 cm    MV Peak A Jorge 0.84 m/s    E wave decelartion time 124.12 msec    MV Peak E Jorge 0.94 m/s    RA Major Axis 5.36 cm    RA Width 4.62 cm    RVDD 3.61 cm    Sinus 3.37 cm    TAPSE 1.68 cm    TR Max Jorge 1.94 m/s    TDI LATERAL 0.07 m/s    TDI SEPTAL 0.10 m/s    LA WIDTH 5.03 cm    LV Diastolic Volume 538.72 mL    LV Systolic Volume 482.19 mL    LV LATERAL E/E' RATIO 13.43 m/s    LV SEPTAL E/E' RATIO 9.40 m/s    FS 15 %    LA volume 135.15 cm3    LV mass 679.25 g    Left Ventricle Relative Wall Thickness 0.16 cm    E/A ratio 1.12     Mean e' 0.09 m/s    LVOT area 3.9 cm2    E/E' ratio 11.06 m/s    LV Systolic Volume Index 199.8 mL/m2    LV Diastolic Volume Index 223.21 mL/m2    LA Volume Index 56.0 mL/m2    LV Mass Index 281 g/m2    Triscuspid Valve Regurgitation Peak Gradient 15 mmHg    BSA 2.47 m2    Right Atrial Pressure (from IVC) 3 mmHg    TV rest pulmonary artery pressure 18 mmHg    Narrative    · LVAD present. [place] is 82092 RPMs. The pump type is a Heartmate II.  · The interventricular septum appears midline. The aortic valve does not   open.  · Severely decreased left ventricular systolic function. The estimated   ejection fraction is 10%  · Severe left ventricular enlargement. LV end diastolic dimension 11 cm.  · Eccentric left ventricular hypertrophy.  · Grade I (mild) left ventricular diastolic dysfunction consistent with   impaired relaxation.  · Mildly to moderately reduced right ventricular systolic function.  · Severe left atrial enlargement.  · Moderate right atrial enlargement.  · Mild-to-moderate mitral regurgitation.  · Normal central venous pressure (3 mm Hg).  · The estimated PA systolic pressure is 18 mm Hg        Assessment and Plan:     Ventricular tachycardia  - underwent sedation  + cardioversion in ER  - likely exacerbated due to acute decompensated heart failure in the setting of prednisone; will treat underlying etiology. Diuresing per primary team.  - continue IV amiodarone gtt till load completes, then PO amio 400mg x 2 weeks followed by 400mg daily. Discontinued lidocaine on 01/13.   - WCT ddx includes VT vs. SVT with aberrancy vs. PMT; most likely PMT. Decreased monitoring and tracking zones as mentioned above.  Changed settings from DDD -> VVI at 50 bpm given PMT on interrogation   - will continue to monitor on telemetry  - routine labs; goal K > 4, Mg > 2  - patient will require generator change with addition of new RV lead/dual coil (if it's not optimally apical, e.g., or one with an SVC coil) vs/+ azygous coil or CS coil. Likely will be completed prior to discharge once HD optimized/thyroid issues have resolved. Likely next week        Mallory Bowser MD  Cardiac Electrophysiology  [facility]

## 2020-01-15 NOTE — NURSING
"Phoned [facility] concerning patient new onset of chest discomfort, SOB , and stating "just not feeling well." BP 82/0. HR 80-90. Baseline in ^0s upcome initial assessment. EKG obtained and STAT chest xray ordered. VAD numbers unchanged. No alarms. WCTM.   "

## 2020-01-15 NOTE — ASSESSMENT & PLAN NOTE
- Likely due to amiodarone per endocrinology.  - US thyroid no nodules   - Continue prednisone 60 mg, methimazole 60 mg BID  - Follow TFT's, next in 72 hrs

## 2020-01-15 NOTE — SUBJECTIVE & OBJECTIVE
Interval History: No events on telemetry overnight. Patient reports that he is less short of breath (diuresing well). Denies chest pain, palpitations.     Review of Systems   All other systems reviewed and are negative.    Objective:     Vital Signs (Most Recent):  Temp: 97.6 °F (36.4 °C) (01/15/20 0701)  Pulse: 68 (01/15/20 0900)  Resp: (!) 23 (01/15/20 0900)  BP: (!) 82/0 (01/15/20 0701)  SpO2: 98 % (01/15/20 0701) Vital Signs (24h Range):  Temp:  [97.6 °F (36.4 °C)-98.6 °F (37 °C)] 97.6 °F (36.4 °C)  Pulse:  [63-91] 68  Resp:  [10-40] 23  SpO2:  [97 %-99 %] 98 %  BP: (78-96)/(0-60) 82/0     Weight: 119.3 kg (263 lb 0.1 oz)  Body mass index is 34.7 kg/m².     SpO2: 98 %  O2 Device (Oxygen Therapy): room air    Physical Exam   Constitutional: He is oriented to person, place, and time. He appears well-developed and well-nourished.   HENT:   Head: Normocephalic.   Eyes: Pupils are equal, round, and reactive to light.   Neck: Neck supple.   Neck veins are flat   Cardiovascular:   Smooth LVAD hum present   Pulmonary/Chest: Effort normal and breath sounds normal.   Abdominal: Soft.   Musculoskeletal: He exhibits edema.   Neurological: He is alert and oriented to person, place, and time.   Skin: Skin is warm.   Psychiatric: He has a normal mood and affect.       Significant Labs:   EP:   Recent Labs   Lab 01/14/20  0336 01/14/20  1716 01/15/20  0345    132* 137   K 3.2* 4.2 3.7   CL 95 94* 95   CO2 33* 29 30*   GLU 93 157* 111*   BUN 20 23* 23*   CREATININE 1.4 1.7* 1.6*   CALCIUM 9.6 9.5 9.6   PROT  --   --  7.4   ALBUMIN  --   --  3.5   BILITOT  --   --  0.4   ALKPHOS  --   --  135   AST  --   --  30   ALT  --   --  38   ANIONGAP 10 9 12   ESTGFRAFRICA >60.0 52.1* 56.0*   EGFRNONAA 57.0* 45.0* 48.5*   WBC 7.44  --  8.80   HGB 13.6*  --  14.0   HCT 45.7  --  46.1     --  248   INR 2.7*  --  1.8*       Significant Imaging: Echocardiogram:   2D echo with color flow doppler:   Results for orders placed or  performed during the hospital encounter of 07/18/18   2D Echo w/ Color Flow Doppler   Result Value Ref Range    QEF 15 (A) 55 - 65    Mitral Valve Regurgitation SEVERE (A)     Est. PA Systolic Pressure 33.6     Mitral Valve Mobility NORMAL     Tricuspid Valve Regurgitation MILD     Narrative    Date of Procedure: 07/18/2018        TEST DESCRIPTION   Technical Quality: This is a technically challenging study. There is poor endocardial definition.     General: A catheter is present in the right-sided cardiac chambers.     Aorta: The aortic root is normal in size, measuring 2.1 cm at sinotubular junction and 3.4 cm at Sinuses of Valsalva. The proximal ascending aorta is normal in size, measuring 3.6 cm across.     Left Atrium: The left atrial volume index is severely enlarged, measuring 73.67 cc/m2.     Left Ventricle: The left ventricle is severely enlarged, with an end-diastolic diameter of 8.3 cm, and an end-systolic diameter of 7.7 cm. LV wall thickness is normal, with the septum and the posterior wall each measuring 0.8 cm across. Relative wall   thickness was normal at 0.19, and the LV mass index was increased at 172.0 g/m2 consistent with eccentric left ventricular hypertrophy. There is global hypokinesis. Left ventricular systolic function appears severely depressed. Visually estimated   ejection fraction is 15-20%.         Right Atrium: The right atrium is enlarged, measuring 6.8 cm in length and 5.1 cm in width in the apical view.     Right Ventricle: The right ventricle is normal in size measuring 3.4 cm at the base in the apical right ventricle-focused view. Global right ventricular systolic function appears moderately depressed. There is abnormal septal motion. Tricuspid annular   plane systolic excursion (TAPSE) is 1.2 cm. The estimated PA systolic pressure is 34 mmHg.     Aortic Valve:  The aortic valve is normal in structure.     Mitral Valve:  The mitral valve is normal in structure with normal leaflet  mobility. There is severe mitral regurgitation.     Tricuspid Valve:  The tricuspid valve is normal in structure with normal leaflet mobility. There is mild tricuspid regurgitation.     Pulmonary Valve:  The pulmonic valve is normal in structure with normal leaflet mobility. There is trivial to mild pulmonic regurgitation.     IVC: IVC is enlarged but collapses > 50% with a sniff, suggesting intermediate right atrial pressure of 8 mmHg.     Intracavitary: There is no evidence of pericardial effusion, intracavity mass, thrombi, or vegetation.     Other: There is a left pleural effusion present.       LVAD: There is a HeartMate II LVAD in place. Inflow cannula is visualized. Outflow graft is not visualized. Baseline speed is 9400 rpms. The aortic valve opens intermittently. Septum is midline.       CONCLUSIONS     1 - Severely depressed left ventricular systolic function (EF 15-20%).     2 - Eccentric hypertrophy.     3 - Biatrial enlargement.     4 - Moderately depressed right ventricular systolic function .     5 - The estimated PA systolic pressure is 34 mmHg.     6 - Severe mitral regurgitation.     7 - Mild tricuspid regurgitation.     8 - Trivial to mild pulmonic regurgitation.     9 - Intermediate central venous pressure.     10 - Left pleural effusion.     11 - HeartMate II LVAD; speed 9400.             This document has been electronically    SIGNED BY: Magi Delong MD On: 07/18/2018 17:10    and Transthoracic echo (TTE) complete (Cupid Only):   Results for orders placed or performed during the hospital encounter of 11/08/19   Echo Color Flow Doppler? Yes   Result Value Ref Range    Ascending aorta 2.83 cm    STJ 3.90 cm    IVS 1.00 0.6 - 1.1 cm    LA size 4.56 cm    Left Atrium Major Axis 7.08 cm    Left Atrium Minor Axis 6.79 cm    LVIDD 11.00 (A) 3.5 - 6.0 cm    LVIDS 9.31 (A) 2.1 - 4.0 cm    LVOT diameter 2.24 cm    PW 0.90 0.6 - 1.1 cm    MV Peak A Jorge 0.84 m/s    E wave decelartion time 124.12 msec     MV Peak E Jorge 0.94 m/s    RA Major Axis 5.36 cm    RA Width 4.62 cm    RVDD 3.61 cm    Sinus 3.37 cm    TAPSE 1.68 cm    TR Max Jorge 1.94 m/s    TDI LATERAL 0.07 m/s    TDI SEPTAL 0.10 m/s    LA WIDTH 5.03 cm    LV Diastolic Volume 538.72 mL    LV Systolic Volume 482.19 mL    LV LATERAL E/E' RATIO 13.43 m/s    LV SEPTAL E/E' RATIO 9.40 m/s    FS 15 %    LA volume 135.15 cm3    LV mass 679.25 g    Left Ventricle Relative Wall Thickness 0.16 cm    E/A ratio 1.12     Mean e' 0.09 m/s    LVOT area 3.9 cm2    E/E' ratio 11.06 m/s    LV Systolic Volume Index 199.8 mL/m2    LV Diastolic Volume Index 223.21 mL/m2    LA Volume Index 56.0 mL/m2    LV Mass Index 281 g/m2    Triscuspid Valve Regurgitation Peak Gradient 15 mmHg    BSA 2.47 m2    Right Atrial Pressure (from IVC) 3 mmHg    TV rest pulmonary artery pressure 18 mmHg    Narrative    · LVAD present. [place] 63[address] RPMs. The pump type is a Heartmate II.  · The interventricular septum appears midline. The aortic valve does not   open.  · Severely decreased left ventricular systolic function. The estimated   ejection fraction is 10%  · Severe left ventricular enlargement. LV end diastolic dimension 11 cm.  · Eccentric left ventricular hypertrophy.  · Grade I (mild) left ventricular diastolic dysfunction consistent with   impaired relaxation.  · Mildly to moderately reduced right ventricular systolic function.  · Severe left atrial enlargement.  · Moderate right atrial enlargement.  · Mild-to-moderate mitral regurgitation.  · Normal central venous pressure (3 mm Hg).  · The estimated PA systolic pressure is 18 mm Hg

## 2020-01-15 NOTE — PHYSICIAN QUERY
PT Name: Tim Richards  MR #: [id number]  Physician Query Form - CKD Clarification     CDS/: Blas Ronquillo Jr, RN              Contact information:[email or web address]  This form is a permanent document in the medical record.     Query Date: January 15, 2020    By submitting this query, we are merely seeking further clarification of documentation. Please utilize your independent clinical judgment when addressing the question(s) below.    The Medical record contains the following:     Indicators   Supporting Clinical Findings   Location in Medical Record   x CKD or Chronic Kidney (Renal) Failure / Disease PMH: CHF, dilated cardiomyopathy, paroxysmal v tech, CKD, HTN, gout    Disorder of kidney and ureter  CKD 1/11 ED Provider Note      1/12 H&P (Past Medical History)   x BUN/Creatinine                          GFR BUN=24    Creatinine=1.9    GFR=46 1/11 Labs    1/11 Labs    1/11 Labs    Dehydration      Nausea / Vomiting      Dialysis / CRRT      Medication      Treatment     x Other Chronic Conditions Hypertension  - Continue amlodipine and doxazosin    1/13 Heart Transplant Progress Note    Other       Provider, please further specify the stage of CKD.     [employer] Definitions     Stage Description eGFR (mL/min)   [   ]    I Slight kidney damage with normal or increased filtration 90+   [   ]   II Mildly reduced kidney function 60-89   [ x  ]    III Moderately reduced kidney function 30-59   [   ] Other (please specify): ____________    [   ]  Clinically Undetermined          Please document in your progress notes daily for the duration of treatment until resolved and include in your discharge summary.

## 2020-01-15 NOTE — ASSESSMENT & PLAN NOTE
Anticoagulation - on warfarin 1.8 today. Will increase dose. If not therapeutic tomorrow, will consider bridge  Procedure: Device Interrogation Including analysis of device parameters  Current Settings: Ventricular Assist Device  Review of device function is stable    TXP LVAD INTERROGATIONS 1/15/2020 1/15/2020 1/15/2020 1/15/2020 1/15/2020 1/15/2020 1/15/2020   Type HeartMate II HeartMate II HeartMate II HeartMate II HeartMate II HeartMate II HeartMate II   Flow 4.6. 4.6 5.3 4.9 4.4 4.4 4.7   Speed 38247 9990 9990 20569 91578 20020 78206   PI 3.3 2.6 3.3 2.2 3.5 3.8 2.3   Power (Jackson) 6.2 6.2 6.5 6.2 6.1 5.1 6.4   LSL 9600 9600 9600 9600 9600 9600 9600   Pulsatility Intermittent pulse Intermittent pulse Intermittent pulse Intermittent pulse Intermittent pulse Intermittent pulse Intermittent pulse

## 2020-01-15 NOTE — PROGRESS NOTES
[facility]  Heart Transplant  Progress Note    Patient Name: Tim Richards  MRN: [id number]  Admission Date: 1/11/2020  Hospital Length of Stay: 4 days  Attending Physician: Bettye Connors MD  Primary Care Provider: Primary Doctor No  Principal Problem:<principal problem not specified>    Subjective:     Interval History: Feels well this AM. No events on telemetry in the past 24 hours. Later, while rounding he felt slightly SOB and dizzy, lasted about 10 minutes - no arrhythmias, VS normal.     Continuous Infusions:   amiodarone in dextrose 5% 1 mg/min (01/15/20 1100)     Scheduled Meds:   allopurinol  100 mg Oral Daily    amLODIPine  10 mg Oral Daily    doxazosin  8 mg Oral Q12H    ferrous gluconate  324 mg Oral Daily with breakfast    furosemide  80 mg Intravenous BID    magnesium oxide  400 mg Oral TID    methIMAzole  20 mg Oral BID    pantoprazole  40 mg Oral Daily    predniSONE  60 mg Oral Daily    warfarin  5 mg Oral Daily     PRN Meds:acetaminophen, melatonin, ondansetron, oxyCODONE    Review of patient's allergies indicates:   Allergen Reactions    Lisinopril Anaphylaxis     Objective:     Vital Signs (Most Recent):  Temp: 98.3 °F (36.8 °C) (01/15/20 1100)  Pulse: 67 (01/15/20 1100)  Resp: (!) 28 (01/15/20 1100)  BP: (!) 78/0 (01/15/20 1100)  SpO2: 99 % (01/15/20 1100) Vital Signs (24h Range):  Temp:  [97.6 °F (36.4 °C)-98.6 °F (37 °C)] 98.3 °F (36.8 °C)  Pulse:  [63-91] 67  Resp:  [10-40] 28  SpO2:  [97 %-99 %] 99 %  BP: (78-96)/(0-60) 78/0     Patient Vitals for the past 72 hrs (Last 3 readings):   Weight   01/14/20 0400 119.3 kg (263 lb 0.1 oz)   01/13/20 0500 116.1 kg (256 lb 0.7 oz)     Body mass index is 34.7 kg/m².      Intake/Output Summary (Last 24 hours) at 1/15/2020 1153  Last data filed at 1/15/2020 1100  Gross per 24 hour   Intake 2588.21 ml   Output 4250 ml   Net -1661.79 ml       Physical Exam   Constitutional: He is oriented to person, place, and time. He  appears well-developed.   Neck: No JVD (5 cm H2O at 30 degrees) present.   Cardiovascular: Normal rate.   Murmur (VAD) heard.  Pulmonary/Chest: Effort normal and breath sounds normal.   Abdominal: Soft. Bowel sounds are normal.   Musculoskeletal: Normal range of motion. He exhibits no edema.   Neurological: He is alert and oriented to person, place, and time. No cranial nerve deficit.   Skin: Skin is warm and dry. Capillary refill takes less than 2 seconds.       Significant Labs:  CBC:  Recent Labs   Lab 01/13/20  0318 01/14/20  0336 01/15/20  0345   WBC 8.00 7.44 8.80   RBC 4.91 5.35 5.51   HGB 12.5* 13.6* 14.0   HCT 42.5 45.7 46.1    250 248   MCV 87 85 84   MCH 25.5* 25.4* 25.4*   MCHC 29.4* 29.8* 30.4*     BNP:  Recent Labs   Lab 01/11/20  2135 01/13/20  0318 01/15/20  0345   * 1,032* 551*     CMP:  Recent Labs   Lab 01/11/20 2135 01/13/20 0318 01/14/20 0336 01/14/20  1716 01/15/20  0345   *   < > 102   < > 93 157* 111*   CALCIUM 9.9   < > 9.1   < > 9.6 9.5 9.6   ALBUMIN 3.9  --  3.4*  --   --   --  3.5   PROT 7.7  --  6.9  --   --   --  7.4      < > 139   < > 138 132* 137   K 3.4*   < > 3.8   < > 3.2* 4.2 3.7   CO2 24   < > 28   < > 33* 29 30*   CL 97   < > 100   < > 95 94* 95   BUN 24*   < > 22*   < > 20 23* 23*   CREATININE 1.9*   < > 1.5*   < > 1.4 1.7* 1.6*   ALKPHOS 130  --  133  --   --   --  135   ALT 40  --  47*  --   --   --  38   AST 25  --  34  --   --   --  30   BILITOT 0.4  --  0.4  --   --   --  0.4    < > = values in this interval not displayed.      Coagulation:   Recent Labs   Lab 01/13/20  0318 01/14/20  0336 01/15/20  0345   INR 5.2* 2.7* 1.8*   APTT 42.5* 36.2* 30.8     LDH:  Recent Labs   Lab 01/13/20  0318 01/14/20  0336 01/15/20  0345   * 375* 423*     Microbiology:  Microbiology Results (last 7 days)     ** No results found for the last 168 hours. **          I have reviewed all pertinent labs within the past 24 hours.    Estimated Creatinine  Clearance: 72.3 mL/min (A) (based on SCr of 1.6 mg/dL (H)).    Diagnostic Results:  I have reviewed all pertinent imaging results/findings within the past 24 hours.    Assessment and Plan:     Tim Richards is a 54 y.o. M with DCM,(EF 10%, grade III DD) s/p HM II with Outflow graft changed (03/9/18) as DT, BiV-Icd [employer] (2014), HtN, obesity transferred from  for VT now in VF, shocked by device x 7.  Has been taking prednisone for hyperthyroidism which has caused him to eat/retain fluid and gain 13lbs as of late.  On exam he is decompensated, k 3.4,  higher than prior, creat 1.9 around baseline, INR 5.3.     He was admitted to the hospital in 10/2019 with similar situation 2/2 ICD shocks. MMVT -- multiple rounds of ATP--degenerated the MMVT into VF and the he was unsuccessfully shocked multiple times . He was eventually shocked externally to convert into sinus rhythm .  It was postulated reasons for ineffective therapy (had worsening hypervolemia week prior, length of time in the arrhythmia prior to ICD shock, IV amiodarone causing higher defibrillatory tissue threshold, or change in heart-shock vector post LVAD implant). He had a NIPS which showed successful DFT at 35J. He was discharge after being diuresed. Device check noted on 12/14/19 - one episode of MMVT 270 ms that was appropriately successfully shocked with multiple episodes of non sustained slower VT. Of note he had a monitoring zone added during his admission at 133. VT 1 zone at 167 -- 6 rounds of ATP added before shock in that zone.      TTE demonstrated EF >10%, AV does not open, IVF dilated 2.5cm with respiratory variation >50%, Dilated RV / LV.    Hyperthyroidism  - Likely due to amiodarone per endocrinology.  - US thyroid no nodules   - Continue prednisone 60 mg, methimazole 60 mg BID  - Follow TFT's, next in 72 hrs    Presence of left ventricular assist device (LVAD)  Anticoagulation - on warfarin 1.8 today. Will increase dose. If  not therapeutic tomorrow, will consider bridge  Procedure: Device Interrogation Including analysis of device parameters  Current Settings: Ventricular Assist Device  Review of device function is stable    TXP LVAD INTERROGATIONS 1/15/2020 1/15/2020 1/15/2020 1/15/2020 1/15/2020 1/15/2020 1/15/2020   Type HeartMate II HeartMate II HeartMate II HeartMate II HeartMate II HeartMate II HeartMate II   Flow 4.6. 4.6 5.3 4.9 4.4 4.4 4.7   Speed 66229 9990 9990 99821 69251 12356 93295   PI 3.3 2.6 3.3 2.2 3.5 3.8 2.3   Power (Jackson) 6.2 6.2 6.5 6.2 6.1 5.1 6.4   LSL 9600 9600 9600 9600 9600 9600 9600   Pulsatility Intermittent pulse Intermittent pulse Intermittent pulse Intermittent pulse Intermittent pulse Intermittent pulse Intermittent pulse       Ventricular tachycardia  - Underwent sedation +external shock in ER  - No events in the past 24 hours, continue to monitor  - Likely exacerbated due to acute decompensated heart failure and hyperthyroidism; will treat underlying etiology  - Continue IV amiodarone for now at 1 mg/min per EP recs  - EP on board, ICD settings adjusted   - Would like to hold off on procedures until TFT's normalize    Hypertension  - Continue amlodipine and doxazosin     Acute on chronic systolic congestive heart failure  - Likely euvolemic or close based on neck veins. Will switch to lasix 80 BID  - Check BMP BID        Sanya Zavala MD  Heart Transplant  [facility]

## 2020-01-15 NOTE — SUBJECTIVE & OBJECTIVE
Interval History: Feels well this AM. No events on telemetry in the past 24 hours. Later, while rounding he felt slightly SOB and dizzy, lasted about 10 minutes - no arrhythmias, VS normal.     Continuous Infusions:   amiodarone in dextrose 5% 1 mg/min (01/15/20 1100)     Scheduled Meds:   allopurinol  100 mg Oral Daily    amLODIPine  10 mg Oral Daily    doxazosin  8 mg Oral Q12H    ferrous gluconate  324 mg Oral Daily with breakfast    furosemide  80 mg Intravenous BID    magnesium oxide  400 mg Oral TID    methIMAzole  20 mg Oral BID    pantoprazole  40 mg Oral Daily    predniSONE  60 mg Oral Daily    warfarin  5 mg Oral Daily     PRN Meds:acetaminophen, melatonin, ondansetron, oxyCODONE    Review of patient's allergies indicates:   Allergen Reactions    Lisinopril Anaphylaxis     Objective:     Vital Signs (Most Recent):  Temp: 98.3 °F (36.8 °C) (01/15/20 1100)  Pulse: 67 (01/15/20 1100)  Resp: (!) 28 (01/15/20 1100)  BP: (!) 78/0 (01/15/20 1100)  SpO2: 99 % (01/15/20 1100) Vital Signs (24h Range):  Temp:  [97.6 °F (36.4 °C)-98.6 °F (37 °C)] 98.3 °F (36.8 °C)  Pulse:  [63-91] 67  Resp:  [10-40] 28  SpO2:  [97 %-99 %] 99 %  BP: (78-96)/(0-60) 78/0     Patient Vitals for the past 72 hrs (Last 3 readings):   Weight   01/14/20 0400 119.3 kg (263 lb 0.1 oz)   01/13/20 0500 116.1 kg (256 lb 0.7 oz)     Body mass index is 34.7 kg/m².      Intake/Output Summary (Last 24 hours) at 1/15/2020 1153  Last data filed at 1/15/2020 1100  Gross per 24 hour   Intake 2588.21 ml   Output 4250 ml   Net -1661.79 ml       Physical Exam   Constitutional: He is oriented to person, place, and time. He appears well-developed.   Neck: No JVD (5 cm H2O at 30 degrees) present.   Cardiovascular: Normal rate.   Murmur (VAD) heard.  Pulmonary/Chest: Effort normal and breath sounds normal.   Abdominal: Soft. Bowel sounds are normal.   Musculoskeletal: Normal range of motion. He exhibits no edema.   Neurological: He is alert and  oriented to person, place, and time. No cranial nerve deficit.   Skin: Skin is warm and dry. Capillary refill takes less than 2 seconds.       Significant Labs:  CBC:  Recent Labs   Lab 01/13/20  0318 01/14/20  0336 01/15/20  0345   WBC 8.00 7.44 8.80   RBC 4.91 5.35 5.51   HGB 12.5* 13.6* 14.0   HCT 42.5 45.7 46.1    250 248   MCV 87 85 84   MCH 25.5* 25.4* 25.4*   MCHC 29.4* 29.8* 30.4*     BNP:  Recent Labs   Lab 01/11/20  2135 01/13/20  0318 01/15/20  0345   * 1,032* 551*     CMP:  Recent Labs   Lab 01/11/20 2135 01/13/20 0318 01/14/20 0336 01/14/20  1716 01/15/20  0345   *   < > 102   < > 93 157* 111*   CALCIUM 9.9   < > 9.1   < > 9.6 9.5 9.6   ALBUMIN 3.9  --  3.4*  --   --   --  3.5   PROT 7.7  --  6.9  --   --   --  7.4      < > 139   < > 138 132* 137   K 3.4*   < > 3.8   < > 3.2* 4.2 3.7   CO2 24   < > 28   < > 33* 29 30*   CL 97   < > 100   < > 95 94* 95   BUN 24*   < > 22*   < > 20 23* 23*   CREATININE 1.9*   < > 1.5*   < > 1.4 1.7* 1.6*   ALKPHOS 130  --  133  --   --   --  135   ALT 40  --  47*  --   --   --  38   AST 25  --  34  --   --   --  30   BILITOT 0.4  --  0.4  --   --   --  0.4    < > = values in this interval not displayed.      Coagulation:   Recent Labs   Lab 01/13/20  0318 01/14/20  0336 01/15/20  0345   INR 5.2* 2.7* 1.8*   APTT 42.5* 36.2* 30.8     LDH:  Recent Labs   Lab 01/13/20 0318 01/14/20  0336 01/15/20  0345   * 375* 423*     Microbiology:  Microbiology Results (last 7 days)     ** No results found for the last 168 hours. **          I have reviewed all pertinent labs within the past 24 hours.    Estimated Creatinine Clearance: 72.3 mL/min (A) (based on SCr of 1.6 mg/dL (H)).    Diagnostic Results:  I have reviewed all pertinent imaging results/findings within the past 24 hours.

## 2020-01-15 NOTE — ASSESSMENT & PLAN NOTE
- Underwent sedation +external shock in ER  - No events in the past 24 hours, continue to monitor  - Likely exacerbated due to acute decompensated heart failure and hyperthyroidism; will treat underlying etiology  - Continue IV amiodarone for now at 1 mg/min per EP recs  - EP on board, ICD settings adjusted   - Would like to hold off on procedures until TFT's normalize

## 2020-01-15 NOTE — PROGRESS NOTES
01/15/2020  Casper Harris    Current provider:  Bettye Connors MD      I, Casper Harris, rounded on Tim Richards to ensure all mechanical assist device settings (IABP or VAD) were appropriate and all parameters were within limits.  I was able to ensure all back up equipment was present, the staff had no issues, and the Perfusion Department daily rounding was complete.    11:59 AM

## 2020-01-15 NOTE — ASSESSMENT & PLAN NOTE
- underwent sedation + cardioversion in ER  - likely exacerbated due to acute decompensated heart failure in the setting of prednisone; will treat underlying etiology. Diuresing per primary team.  - continue IV amiodarone gtt till load completes, then PO amio 400mg x 2 weeks followed by 400mg daily. Discontinued lidocaine on 01/13.   - WCT ddx includes VT vs. SVT with aberrancy vs. PMT; most likely PMT. Decreased monitoring and tracking zones as mentioned above.  Changed settings from DDD -> VVI at 50 bpm given PMT on interrogation   - will continue to monitor on telemetry  - routine labs; goal K > 4, Mg > 2  - patient will require generator change with addition of new RV lead/dual coil (if it's not optimally apical, e.g., or one with an SVC coil) vs/+ azygous coil or CS coil. Likely will be completed prior to discharge once HD optimized/thyroid issues have resolved. Likely next week

## 2020-01-16 LAB
ANION GAP SERPL CALC-SCNC: 8 MMOL/L (ref 8–16)
ANION GAP SERPL CALC-SCNC: 9 MMOL/L (ref 8–16)
APTT BLDCRRT: 29.8 SEC (ref 21–32)
APTT BLDCRRT: 29.8 SEC (ref 21–32)
BASOPHILS # BLD AUTO: 0.02 K/UL (ref 0–0.2)
BASOPHILS NFR BLD: 0.2 % (ref 0–1.9)
BUN SERPL-MCNC: 24 MG/DL (ref 6–20)
BUN SERPL-MCNC: 24 MG/DL (ref 6–20)
CALCIUM SERPL-MCNC: 9.5 MG/DL (ref 8.7–10.5)
CALCIUM SERPL-MCNC: 9.5 MG/DL (ref 8.7–10.5)
CHLORIDE SERPL-SCNC: 93 MMOL/L (ref 95–110)
CHLORIDE SERPL-SCNC: 95 MMOL/L (ref 95–110)
CO2 SERPL-SCNC: 29 MMOL/L (ref 23–29)
CO2 SERPL-SCNC: 32 MMOL/L (ref 23–29)
CREAT SERPL-MCNC: 1.4 MG/DL (ref 0.5–1.4)
CREAT SERPL-MCNC: 1.7 MG/DL (ref 0.5–1.4)
DIFFERENTIAL METHOD: ABNORMAL
EOSINOPHIL # BLD AUTO: 0 K/UL (ref 0–0.5)
EOSINOPHIL NFR BLD: 0.3 % (ref 0–8)
ERYTHROCYTE [DISTWIDTH] IN BLOOD BY AUTOMATED COUNT: 14.2 % (ref 11.5–14.5)
EST. GFR  (AFRICAN AMERICAN): 52.1 ML/MIN/1.73 M^2
EST. GFR  (AFRICAN AMERICAN): >60 ML/MIN/1.73 M^2
EST. GFR  (NON AFRICAN AMERICAN): 45 ML/MIN/1.73 M^2
EST. GFR  (NON AFRICAN AMERICAN): 57 ML/MIN/1.73 M^2
GLUCOSE SERPL-MCNC: 139 MG/DL (ref 70–110)
GLUCOSE SERPL-MCNC: 80 MG/DL (ref 70–110)
HCT VFR BLD AUTO: 45.9 % (ref 40–54)
HGB BLD-MCNC: 14.5 G/DL (ref 14–18)
IMM GRANULOCYTES # BLD AUTO: 0.13 K/UL (ref 0–0.04)
IMM GRANULOCYTES NFR BLD AUTO: 1.5 % (ref 0–0.5)
INR PPP: 1.5 (ref 0.8–1.2)
INR PPP: 1.6 (ref 0.8–1.2)
LDH SERPL L TO P-CCNC: 272 U/L (ref 110–260)
LYMPHOCYTES # BLD AUTO: 1 K/UL (ref 1–4.8)
LYMPHOCYTES NFR BLD: 11.4 % (ref 18–48)
MAGNESIUM SERPL-MCNC: 2.3 MG/DL (ref 1.6–2.6)
MCH RBC QN AUTO: 26.3 PG (ref 27–31)
MCHC RBC AUTO-ENTMCNC: 31.6 G/DL (ref 32–36)
MCV RBC AUTO: 83 FL (ref 82–98)
MONOCYTES # BLD AUTO: 1.1 K/UL (ref 0.3–1)
MONOCYTES NFR BLD: 12.4 % (ref 4–15)
NEUTROPHILS # BLD AUTO: 6.4 K/UL (ref 1.8–7.7)
NEUTROPHILS NFR BLD: 74.2 % (ref 38–73)
NRBC BLD-RTO: 0 /100 WBC
PHOSPHATE SERPL-MCNC: 3.2 MG/DL (ref 2.7–4.5)
PLATELET # BLD AUTO: 213 K/UL (ref 150–350)
PMV BLD AUTO: 10.2 FL (ref 9.2–12.9)
POTASSIUM SERPL-SCNC: 3.2 MMOL/L (ref 3.5–5.1)
POTASSIUM SERPL-SCNC: 4.2 MMOL/L (ref 3.5–5.1)
POTASSIUM SERPL-SCNC: 4.2 MMOL/L (ref 3.5–5.1)
PROTHROMBIN TIME: 14.9 SEC (ref 9–12.5)
PROTHROMBIN TIME: 15.4 SEC (ref 9–12.5)
RBC # BLD AUTO: 5.52 M/UL (ref 4.6–6.2)
SODIUM SERPL-SCNC: 133 MMOL/L (ref 136–145)
SODIUM SERPL-SCNC: 133 MMOL/L (ref 136–145)
WBC # BLD AUTO: 8.65 K/UL (ref 3.9–12.7)

## 2020-01-16 PROCEDURE — 25000003 PHARM REV CODE 250: Performed by: STUDENT IN AN ORGANIZED HEALTH CARE EDUCATION/TRAINING PROGRAM

## 2020-01-16 PROCEDURE — 83615 LACTATE (LD) (LDH) ENZYME: CPT

## 2020-01-16 PROCEDURE — 27000248 HC VAD-ADDITIONAL DAY

## 2020-01-16 PROCEDURE — 99232 SBSQ HOSP IP/OBS MODERATE 35: CPT | Mod: ,,, | Performed by: INTERNAL MEDICINE

## 2020-01-16 PROCEDURE — 20600001 HC STEP DOWN PRIVATE ROOM

## 2020-01-16 PROCEDURE — 93750 PR INTERROGATE VENT ASSIST DEV, IN PERSON, W PHYSICIAN ANALYSIS: ICD-10-PCS | Mod: ,,, | Performed by: INTERNAL MEDICINE

## 2020-01-16 PROCEDURE — 80048 BASIC METABOLIC PNL TOTAL CA: CPT

## 2020-01-16 PROCEDURE — 85025 COMPLETE CBC W/AUTO DIFF WBC: CPT

## 2020-01-16 PROCEDURE — 63600175 PHARM REV CODE 636 W HCPCS: Performed by: STUDENT IN AN ORGANIZED HEALTH CARE EDUCATION/TRAINING PROGRAM

## 2020-01-16 PROCEDURE — 99233 SBSQ HOSP IP/OBS HIGH 50: CPT | Mod: ,,, | Performed by: INTERNAL MEDICINE

## 2020-01-16 PROCEDURE — 85610 PROTHROMBIN TIME: CPT | Mod: 91

## 2020-01-16 PROCEDURE — 63600175 PHARM REV CODE 636 W HCPCS: Performed by: INTERNAL MEDICINE

## 2020-01-16 PROCEDURE — 36415 COLL VENOUS BLD VENIPUNCTURE: CPT

## 2020-01-16 PROCEDURE — 85730 THROMBOPLASTIN TIME PARTIAL: CPT

## 2020-01-16 PROCEDURE — 99232 PR SUBSEQUENT HOSPITAL CARE,LEVL II: ICD-10-PCS | Mod: ,,, | Performed by: INTERNAL MEDICINE

## 2020-01-16 PROCEDURE — 85610 PROTHROMBIN TIME: CPT

## 2020-01-16 PROCEDURE — 25000003 PHARM REV CODE 250: Performed by: INTERNAL MEDICINE

## 2020-01-16 PROCEDURE — 25000003 PHARM REV CODE 250: Performed by: GENERAL ACUTE CARE HOSPITAL

## 2020-01-16 PROCEDURE — 93750 INTERROGATION VAD IN PERSON: CPT | Mod: ,,, | Performed by: INTERNAL MEDICINE

## 2020-01-16 PROCEDURE — 85730 THROMBOPLASTIN TIME PARTIAL: CPT | Mod: 91

## 2020-01-16 PROCEDURE — 84100 ASSAY OF PHOSPHORUS: CPT

## 2020-01-16 PROCEDURE — 83735 ASSAY OF MAGNESIUM: CPT

## 2020-01-16 PROCEDURE — 99233 PR SUBSEQUENT HOSPITAL CARE,LEVL III: ICD-10-PCS | Mod: ,,, | Performed by: INTERNAL MEDICINE

## 2020-01-16 PROCEDURE — 94761 N-INVAS EAR/PLS OXIMETRY MLT: CPT

## 2020-01-16 PROCEDURE — 84132 ASSAY OF SERUM POTASSIUM: CPT

## 2020-01-16 RX ORDER — AMOXICILLIN 250 MG
1 CAPSULE ORAL 2 TIMES DAILY
Status: DISCONTINUED | OUTPATIENT
Start: 2020-01-16 | End: 2020-01-20 | Stop reason: HOSPADM

## 2020-01-16 RX ORDER — FUROSEMIDE 10 MG/ML
80 INJECTION INTRAMUSCULAR; INTRAVENOUS 3 TIMES DAILY
Status: DISCONTINUED | OUTPATIENT
Start: 2020-01-16 | End: 2020-01-17

## 2020-01-16 RX ORDER — AMIODARONE HYDROCHLORIDE 200 MG/1
400 TABLET ORAL 2 TIMES DAILY
Status: DISCONTINUED | OUTPATIENT
Start: 2020-01-16 | End: 2020-01-20 | Stop reason: HOSPADM

## 2020-01-16 RX ORDER — POLYETHYLENE GLYCOL 3350 17 G/17G
17 POWDER, FOR SOLUTION ORAL DAILY
Status: DISCONTINUED | OUTPATIENT
Start: 2020-01-17 | End: 2020-01-20 | Stop reason: HOSPADM

## 2020-01-16 RX ORDER — LACTULOSE 10 G/15ML
20 SOLUTION ORAL ONCE
Status: COMPLETED | OUTPATIENT
Start: 2020-01-16 | End: 2020-01-16

## 2020-01-16 RX ORDER — HEPARIN SODIUM,PORCINE/D5W 25000/250
12 INTRAVENOUS SOLUTION INTRAVENOUS CONTINUOUS
Status: DISCONTINUED | OUTPATIENT
Start: 2020-01-16 | End: 2020-01-19

## 2020-01-16 RX ORDER — POTASSIUM CHLORIDE 20 MEQ/1
60 TABLET, EXTENDED RELEASE ORAL ONCE
Status: COMPLETED | OUTPATIENT
Start: 2020-01-16 | End: 2020-01-16

## 2020-01-16 RX ADMIN — DOXAZOSIN 8 MG: 4 TABLET ORAL at 08:01

## 2020-01-16 RX ADMIN — POTASSIUM CHLORIDE 60 MEQ: 1500 TABLET, EXTENDED RELEASE ORAL at 06:01

## 2020-01-16 RX ADMIN — HEPARIN SODIUM AND DEXTROSE 12 UNITS/KG/HR: 10000; 5 INJECTION INTRAVENOUS at 11:01

## 2020-01-16 RX ADMIN — AMIODARONE HYDROCHLORIDE 1 MG/MIN: 1.8 INJECTION, SOLUTION INTRAVENOUS at 01:01

## 2020-01-16 RX ADMIN — AMIODARONE HYDROCHLORIDE 400 MG: 200 TABLET ORAL at 12:01

## 2020-01-16 RX ADMIN — FUROSEMIDE 80 MG: 10 INJECTION, SOLUTION INTRAVENOUS at 02:01

## 2020-01-16 RX ADMIN — WARFARIN SODIUM 5 MG: 5 TABLET ORAL at 06:01

## 2020-01-16 RX ADMIN — Medication 400 MG: at 08:01

## 2020-01-16 RX ADMIN — DOCUSATE SODIUM 50 MG: 50 CAPSULE, LIQUID FILLED ORAL at 08:01

## 2020-01-16 RX ADMIN — FUROSEMIDE 80 MG: 10 INJECTION, SOLUTION INTRAVENOUS at 08:01

## 2020-01-16 RX ADMIN — SENNOSIDES AND DOCUSATE SODIUM 1 TABLET: 8.6; 5 TABLET ORAL at 08:01

## 2020-01-16 RX ADMIN — FERROUS GLUCONATE TAB 324 MG (37.5 MG ELEMENTAL IRON) 324 MG: 324 (37.5 FE) TAB at 08:01

## 2020-01-16 RX ADMIN — AMIODARONE HYDROCHLORIDE 400 MG: 200 TABLET ORAL at 08:01

## 2020-01-16 RX ADMIN — ALLOPURINOL 100 MG: 100 TABLET ORAL at 08:01

## 2020-01-16 RX ADMIN — LACTULOSE 20 G: 20 SOLUTION ORAL at 03:01

## 2020-01-16 RX ADMIN — METHIMAZOLE 20 MG: 10 TABLET ORAL at 08:01

## 2020-01-16 RX ADMIN — Medication 400 MG: at 02:01

## 2020-01-16 RX ADMIN — AMIODARONE HYDROCHLORIDE 1 MG/MIN: 1.8 INJECTION, SOLUTION INTRAVENOUS at 07:01

## 2020-01-16 RX ADMIN — OXYCODONE HYDROCHLORIDE 10 MG: 10 TABLET ORAL at 10:01

## 2020-01-16 RX ADMIN — PANTOPRAZOLE SODIUM 40 MG: 40 TABLET, DELAYED RELEASE ORAL at 08:01

## 2020-01-16 RX ADMIN — ACETAMINOPHEN 650 MG: 325 TABLET ORAL at 08:01

## 2020-01-16 RX ADMIN — Medication 6 MG: at 10:01

## 2020-01-16 RX ADMIN — PREDNISONE 60 MG: 20 TABLET ORAL at 08:01

## 2020-01-16 RX ADMIN — AMLODIPINE BESYLATE 10 MG: 10 TABLET ORAL at 01:01

## 2020-01-16 NOTE — NURSING TRANSFER
Nursing Transfer Note      1/16/2020     Transfer {TRANSFER TO/FROM:99791}: ***    Transfer via {TRANSFER VIA:90591}    Transfer with {TRANSFER WITH:05149}    Transported by ***    Medicines sent: ***    Chart send with patient: {YES (DEF)/NO:34067}    Notified: {NOTIFIED:69589}    Patient reassessed at: *** (date, time)    Upon arrival to floor: {IP NSG TRANSFER ARRIVAL [facility]:94496}

## 2020-01-16 NOTE — ASSESSMENT & PLAN NOTE
- Underwent sedation +external shock in ER  - No further events.  - Likely exacerbated due to acute decompensated heart failure and hyperthyroidism; will treat underlying etiology  - Will switch amiodarone from IV to 400 mg BID PO  - ICD settings adjusted by EP   - Would like to hold off on procedures until TFT's normalize

## 2020-01-16 NOTE — PROGRESS NOTES
01/16/2020  Fitz Christianson    Current provider:  Bettye Connors MD      I, Fitz Christianson, rounded on Tim Richards to ensure all mechanical assist device settings (IABP or VAD) were appropriate and all parameters were within limits.  I was able to ensure all back up equipment was present, the staff had no issues, and the Perfusion Department daily rounding was complete.    9:42 AM

## 2020-01-16 NOTE — NURSING TRANSFER
Nursing Transfer Note      1/16/2020     [id number]    Transfer via wheelchair    Transfer with LVAD power module. LVAD backup batteries/ IV pole/ tele monitor    Transported by RNx2    Medicines sent: yes    Chart send with patient: yes    Notified: wife with patient

## 2020-01-16 NOTE — SUBJECTIVE & OBJECTIVE
"Interval HPI:   Pt is AAOX4, Clinically Euthyroid, HR 60 - 70s (No Tachycardia or repeated Vtach episodes), Tmax 97.5. (No fevers)     Overnight events: NINA   Eatin%  Nausea: No  Hypoglycemia and intervention: No  Fever: No  TPN and/or TF: No    BP (!) 88/0 (BP Location: Left arm, Patient Position: Lying)   Pulse 73   Temp 97.5 °F (36.4 °C) (Oral)   Resp (!) 22   Ht 6' 1" (1.854 m)   Wt 119.3 kg (263 lb 0.1 oz)   SpO2 96%   BMI 34.70 kg/m²     Labs Reviewed and Include    Recent Labs   Lab 20  0500 20  0859   GLU 80  --    CALCIUM 9.5  --    *  --    K 3.2* 4.2   CO2 32*  --    CL 93*  --    BUN 24*  --    CREATININE 1.4  --      Lab Results   Component Value Date    WBC 8.65 2020    HGB 14.5 2020    HCT 45.9 2020    MCV 83 2020     2020     Recent Labs   Lab 20  2135 20  0929   TSH <0.010* <0.010*   FREET4 3.91* 3.91*     Lab Results   Component Value Date    HGBA1C 5.5 2018       Nutritional status:   Body mass index is 34.7 kg/m².  Lab Results   Component Value Date    ALBUMIN 3.5 01/15/2020    ALBUMIN 3.4 (L) 2020    ALBUMIN 3.9 2020     Lab Results   Component Value Date    PREALBUMIN 28 01/15/2020    PREALBUMIN 24 2020    PREALBUMIN 40 2019       Estimated Creatinine Clearance: 82.6 mL/min (based on SCr of 1.4 mg/dL).    Accu-Checks  No results for input(s): POCTGLUCOSE in the last 72 hours.    Current Medications and/or Treatments Impacting Glycemic Control  Immunotherapy:    Immunosuppressants     None        Steroids:   Hormones (From admission, onward)    Start     Stop Route Frequency Ordered    01/15/20 0015  melatonin tablet 6 mg      -- Oral Nightly PRN 01/15/20 0016    20 0900  predniSONE tablet 60 mg      -- Oral Daily 20 0209        Pressors:    Autonomic Drugs (From admission, onward)    None        Hyperglycemia/Diabetes Medications:   Antihyperglycemics (From admission, " onward)    None

## 2020-01-16 NOTE — ASSESSMENT & PLAN NOTE
- Likely due to amiodarone per endocrinology.  - US thyroid no nodules   - Continue prednisone 60 mg, methimazole 60 mg BID  - Follow TFT's, next tomorrow

## 2020-01-16 NOTE — PROGRESS NOTES
"[facility]  Endocrinology  Progress Note    Admit Date: 2020     53 YO Male w/ diagnosis of Amiodarone induced hyperthyroidism Type 2, HTN ,DCM,(EF 10%, grade III DD) s/p HM II with Outflow graft changed (18) as DT, BiV-Icd Medtronic () and obesity transferred from  for VT now in VF, shocked by device x 7 times.  Pt was admitted to Cardiology unit.  During hospital course patient noted to have TSH < 0.01 and FT4 3.9.  Pt is being followed outpatient with endocrinology for Amiodarone induced hyperthyroidism Type 2 since .  Pt was started on Prednisone 60mg and Methimazole 20mg qam and 10mg qPM.  Pt has been having fluctuating TFT in the past due to prednisone non compliance.  Pt states he ran out of Prednisone x 1 week prior to admission due to running out of medications.  Pt endorses tremors, palpitations and diaphoresis since stopping Prednisone.  Pt was consulted with endocrinology for management of hyperthyroidism.      Interval HPI:   Pt is AAOX4, Clinically Euthyroid, HR 60 - 70s (No Tachycardia or repeated Vtach episodes), Tmax 97.5. (No fevers)     Overnight events: NINA   Eatin%  Nausea: No  Hypoglycemia and intervention: No  Fever: No  TPN and/or TF: No    BP (!) 88/0 (BP Location: Left arm, Patient Position: Lying)   Pulse 73   Temp 97.5 °F (36.4 °C) (Oral)   Resp (!) 22   Ht 6' 1" (1.854 m)   Wt 119.3 kg (263 lb 0.1 oz)   SpO2 96%   BMI 34.70 kg/m²      Labs Reviewed and Include    Recent Labs   Lab 20  0500 20  0859   GLU 80  --    CALCIUM 9.5  --    *  --    K 3.2* 4.2   CO2 32*  --    CL 93*  --    BUN 24*  --    CREATININE 1.4  --      Lab Results   Component Value Date    WBC 8.65 2020    HGB 14.5 2020    HCT 45.9 2020    MCV 83 2020     2020     Recent Labs   Lab 20  2135 20  0929   TSH <0.010* <0.010*   FREET4 3.91* 3.91*     Lab Results   Component Value Date    HGBA1C 5.5 " 03/08/2018       Nutritional status:   Body mass index is 34.7 kg/m².  Lab Results   Component Value Date    ALBUMIN 3.5 01/15/2020    ALBUMIN 3.4 (L) 01/13/2020    ALBUMIN 3.9 01/11/2020     Lab Results   Component Value Date    PREALBUMIN 28 01/15/2020    PREALBUMIN 24 01/13/2020    PREALBUMIN 40 12/05/2019       Estimated Creatinine Clearance: 82.6 mL/min (based on SCr of 1.4 mg/dL).    Accu-Checks  No results for input(s): POCTGLUCOSE in the last 72 hours.    Current Medications and/or Treatments Impacting Glycemic Control  Immunotherapy:    Immunosuppressants     None        Steroids:   Hormones (From admission, onward)    Start     Stop Route Frequency Ordered    01/15/20 0015  melatonin tablet 6 mg      -- Oral Nightly PRN 01/15/20 0016    01/12/20 0900  predniSONE tablet 60 mg      -- Oral Daily 01/12/20 0209        Pressors:    Autonomic Drugs (From admission, onward)    None        Hyperglycemia/Diabetes Medications:   Antihyperglycemics (From admission, onward)    None          ASSESSMENT and PLAN    VF (ventricular fibrillation)  -Managed by cardiology   -Pt with Amiodarone induced hyperthyroidism Type 2   (Unable to be off amiodarone due to repeated Vtach episodes, benefits outweigh the risk)   -Will manage hyperthyroidism with Prednisone and Methimazole         Amiodarone-induced hyperthyroidism  -Pt on Amiodarone since 2012   -Currently on Amiodarone drip   -Likely Type 2 Amiodarone hyperthyroidism in the setting of chronic amiodarone and Negative Ab   -TRAb (-)   -Thyroid US from 1/13/20 Showing Thyromegaly and Normal vascularity which is suggestive of Type 2 Amiodarone induced hyperthyroidism   -No clinical evidence of Thyroid storm   -Clinically Euthyroid   -TSH < 0.01,   FT4 3.9  (Hyperthyroid likely 2/2 prednisone non compliance x 1 week)     Plan:   -C/w Prednisone 60mg PO daily   -C/w  Methimazole to 20mg BID   -Follow TSI and TPO Ab   -Repeat TFT Tomorrow 1/17/20  (Labs ordered)           -Due  to Type 2 thyroiditis (Treatment is limited to Steroids for antiinflammatory effects on the thyroid gland and Thyroidectomy)   Would recommend normalization of TFT with prednisone and Methimazole prior to surgical evaluation due to degree of hyperthyroidism and patient being a high risk surgical candidate due to advance cardiac disease.     Acute on chronic systolic congestive heart failure  -Managed by primary team           Dalton Chance MD  Endocrinology  [facility]

## 2020-01-16 NOTE — ASSESSMENT & PLAN NOTE
-Managed by cardiology   -Pt with Amiodarone induced hyperthyroidism Type 2   (Unable to be off amiodarone due to repeated Vtach episodes, benefits outweigh the risk)   -Will manage hyperthyroidism with Prednisone and Methimazole

## 2020-01-16 NOTE — SUBJECTIVE & OBJECTIVE
Interval History: Feels well this AM, no episodes of SOB since yesterday morning.     Continuous Infusions:   heparin (porcine) in D5W       Scheduled Meds:   allopurinol  100 mg Oral Daily    amiodarone  400 mg Oral BID    amLODIPine  10 mg Oral Daily    docusate sodium  50 mg Oral Daily    doxazosin  8 mg Oral Q12H    ferrous gluconate  324 mg Oral Daily with breakfast    furosemide  80 mg Intravenous TID    magnesium oxide  400 mg Oral TID    methIMAzole  20 mg Oral BID    pantoprazole  40 mg Oral Daily    predniSONE  60 mg Oral Daily    warfarin  5 mg Oral Daily     PRN Meds:acetaminophen, melatonin, ondansetron, oxyCODONE, polyethylene glycol    Review of patient's allergies indicates:   Allergen Reactions    Lisinopril Anaphylaxis     Objective:     Vital Signs (Most Recent):  Temp: 97.5 °F (36.4 °C) (01/16/20 0701)  Pulse: 73 (01/16/20 0900)  Resp: (!) 22 (01/16/20 0900)  BP: (!) 88/0 (01/16/20 0701)  SpO2: 96 % (01/16/20 0701) Vital Signs (24h Range):  Temp:  [97.5 °F (36.4 °C)-98.4 °F (36.9 °C)] 97.5 °F (36.4 °C)  Pulse:  [63-80] 73  Resp:  [13-35] 22  SpO2:  [95 %-98 %] 96 %  BP: (80-90)/(0) 88/0     Patient Vitals for the past 72 hrs (Last 3 readings):   Weight   01/14/20 0400 119.3 kg (263 lb 0.1 oz)     Body mass index is 34.7 kg/m².      Intake/Output Summary (Last 24 hours) at 1/16/2020 1119  Last data filed at 1/16/2020 0900  Gross per 24 hour   Intake 2244.61 ml   Output 1225 ml   Net 1019.61 ml       Physical Exam   Constitutional: He is oriented to person, place, and time. He appears well-developed.   Neck: No JVD (6-7 cm H2O at 30 degrees) present.   Cardiovascular: Normal rate.   Murmur (VAD) heard.  Pulmonary/Chest: Effort normal and breath sounds normal.   Abdominal: Soft. Bowel sounds are normal.   Musculoskeletal: Normal range of motion. He exhibits no edema.   Neurological: He is alert and oriented to person, place, and time. No cranial nerve deficit.   Skin: Skin is warm and  dry. Capillary refill takes less than 2 seconds.       Significant Labs:  CBC:  Recent Labs   Lab 01/14/20  0336 01/15/20  0345 01/16/20  0500   WBC 7.44 8.80 8.65   RBC 5.35 5.51 5.52   HGB 13.6* 14.0 14.5   HCT 45.7 46.1 45.9    248 213   MCV 85 84 83   MCH 25.4* 25.4* 26.3*   MCHC 29.8* 30.4* 31.6*     BNP:  Recent Labs   Lab 01/11/20  2135 01/13/20  0318 01/15/20  0345   * 1,032* 551*     CMP:  Recent Labs   Lab 01/11/20  2135  01/13/20  0318  01/15/20  0345 01/15/20  1721 01/16/20  0500 01/16/20  0859   *   < > 102   < > 111* 198* 80  --    CALCIUM 9.9   < > 9.1   < > 9.6 9.6 9.5  --    ALBUMIN 3.9  --  3.4*  --  3.5  --   --   --    PROT 7.7  --  6.9  --  7.4  --   --   --       < > 139   < > 137 131* 133*  --    K 3.4*   < > 3.8   < > 3.7 4.6 3.2* 4.2   CO2 24   < > 28   < > 30* 26 32*  --    CL 97   < > 100   < > 95 94* 93*  --    BUN 24*   < > 22*   < > 23* 24* 24*  --    CREATININE 1.9*   < > 1.5*   < > 1.6* 1.7* 1.4  --    ALKPHOS 130  --  133  --  135  --   --   --    ALT 40  --  47*  --  38  --   --   --    AST 25  --  34  --  30  --   --   --    BILITOT 0.4  --  0.4  --  0.4  --   --   --     < > = values in this interval not displayed.      Coagulation:   Recent Labs   Lab 01/14/20  0336 01/15/20  0345 01/16/20  0500   INR 2.7* 1.8* 1.6*   APTT 36.2* 30.8 29.8     LDH:  Recent Labs   Lab 01/14/20  0336 01/15/20  0345 01/16/20  0500   * 423* 272*     Microbiology:  Microbiology Results (last 7 days)     ** No results found for the last 168 hours. **          I have reviewed all pertinent labs within the past 24 hours.    Estimated Creatinine Clearance: 82.6 mL/min (based on SCr of 1.4 mg/dL).    Diagnostic Results:  I have reviewed all pertinent imaging results/findings within the past 24 hours.

## 2020-01-16 NOTE — PROGRESS NOTES
01/16/20 1510 01/16/20 1512   Vital Signs   Temp 98.5 °F (36.9 °C)  --    Temp src Oral  --    Pulse 73  --    Heart Rate Source Monitor  --    Resp 16  --    SpO2 97 %  --    Pulse Oximetry Type Intermittent  --    O2 Device (Oxygen Therapy) room air  --    BP (!) 115/59 (!) 88/0   MAP (mmHg) 81  --    BP Location Left arm Left arm   BP Method Automatic Doppler   Patient Position Lying Lying   JANAK Zavala notified.  No new orders at this time.

## 2020-01-16 NOTE — PROGRESS NOTES
[facility]  Heart Transplant  Progress Note    Patient Name: Tim Richards  MRN: [id number]  Admission Date: 1/11/2020  Hospital Length of Stay: 5 days  Attending Physician: Bettye Connors MD  Primary Care Provider: Primary Doctor No  Principal Problem:<principal problem not specified>    Subjective:     Interval History: Feels well this AM, no episodes of SOB since yesterday morning.     Continuous Infusions:   heparin (porcine) in D5W       Scheduled Meds:   allopurinol  100 mg Oral Daily    amiodarone  400 mg Oral BID    amLODIPine  10 mg Oral Daily    docusate sodium  50 mg Oral Daily    doxazosin  8 mg Oral Q12H    ferrous gluconate  324 mg Oral Daily with breakfast    furosemide  80 mg Intravenous TID    magnesium oxide  400 mg Oral TID    methIMAzole  20 mg Oral BID    pantoprazole  40 mg Oral Daily    predniSONE  60 mg Oral Daily    warfarin  5 mg Oral Daily     PRN Meds:acetaminophen, melatonin, ondansetron, oxyCODONE, polyethylene glycol    Review of patient's allergies indicates:   Allergen Reactions    Lisinopril Anaphylaxis     Objective:     Vital Signs (Most Recent):  Temp: 97.5 °F (36.4 °C) (01/16/20 0701)  Pulse: 73 (01/16/20 0900)  Resp: (!) 22 (01/16/20 0900)  BP: (!) 88/0 (01/16/20 0701)  SpO2: 96 % (01/16/20 0701) Vital Signs (24h Range):  Temp:  [97.5 °F (36.4 °C)-98.4 °F (36.9 °C)] 97.5 °F (36.4 °C)  Pulse:  [63-80] 73  Resp:  [13-35] 22  SpO2:  [95 %-98 %] 96 %  BP: (80-90)/(0) 88/0     Patient Vitals for the past 72 hrs (Last 3 readings):   Weight   01/14/20 0400 119.3 kg (263 lb 0.1 oz)     Body mass index is 34.7 kg/m².      Intake/Output Summary (Last 24 hours) at 1/16/2020 1119  Last data filed at 1/16/2020 0900  Gross per 24 hour   Intake 2244.61 ml   Output 1225 ml   Net 1019.61 ml       Physical Exam   Constitutional: He is oriented to person, place, and time. He appears well-developed.   Neck: No JVD (6-7 cm H2O at 30 degrees) present.    Cardiovascular: Normal rate.   Murmur (VAD) heard.  Pulmonary/Chest: Effort normal and breath sounds normal.   Abdominal: Soft. Bowel sounds are normal.   Musculoskeletal: Normal range of motion. He exhibits no edema.   Neurological: He is alert and oriented to person, place, and time. No cranial nerve deficit.   Skin: Skin is warm and dry. Capillary refill takes less than 2 seconds.       Significant Labs:  CBC:  Recent Labs   Lab 01/14/20  0336 01/15/20  0345 01/16/20  0500   WBC 7.44 8.80 8.65   RBC 5.35 5.51 5.52   HGB 13.6* 14.0 14.5   HCT 45.7 46.1 45.9    248 213   MCV 85 84 83   MCH 25.4* 25.4* 26.3*   MCHC 29.8* 30.4* 31.6*     BNP:  Recent Labs   Lab 01/11/20 2135 01/13/20 0318 01/15/20  0345   * 1,032* 551*     CMP:  Recent Labs   Lab 01/11/20 2135 01/13/20 0318  01/15/20  0345 01/15/20  1721 01/16/20  0500 01/16/20  0859   *   < > 102   < > 111* 198* 80  --    CALCIUM 9.9   < > 9.1   < > 9.6 9.6 9.5  --    ALBUMIN 3.9  --  3.4*  --  3.5  --   --   --    PROT 7.7  --  6.9  --  7.4  --   --   --       < > 139   < > 137 131* 133*  --    K 3.4*   < > 3.8   < > 3.7 4.6 3.2* 4.2   CO2 24   < > 28   < > 30* 26 32*  --    CL 97   < > 100   < > 95 94* 93*  --    BUN 24*   < > 22*   < > 23* 24* 24*  --    CREATININE 1.9*   < > 1.5*   < > 1.6* 1.7* 1.4  --    ALKPHOS 130  --  133  --  135  --   --   --    ALT 40  --  47*  --  38  --   --   --    AST 25  --  34  --  30  --   --   --    BILITOT 0.4  --  0.4  --  0.4  --   --   --     < > = values in this interval not displayed.      Coagulation:   Recent Labs   Lab 01/14/20  0336 01/15/20  0345 01/16/20  0500   INR 2.7* 1.8* 1.6*   APTT 36.2* 30.8 29.8     LDH:  Recent Labs   Lab 01/14/20  0336 01/15/20  0345 01/16/20  0500   * 423* 272*     Microbiology:  Microbiology Results (last 7 days)     ** No results found for the last 168 hours. **          I have reviewed all pertinent labs within the past 24 hours.    Estimated  Creatinine Clearance: 82.6 mL/min (based on SCr of 1.4 mg/dL).    Diagnostic Results:  I have reviewed all pertinent imaging results/findings within the past 24 hours.    Assessment and Plan:     Tim Richards is a 54 y.o. M with DCM,(EF 10%, grade III DD) s/p HM II with Outflow graft changed (03/9/18) as DT, BiV-Icd Medtronic (2014), HtN, obesity transferred from  for VT now in VF, shocked by device x 7.  Has been taking prednisone for hyperthyroidism which has caused him to eat/retain fluid and gain 13lbs as of late.  On exam he is decompensated, k 3.4,  higher than prior, creat 1.9 around baseline, INR 5.3.     He was admitted to the hospital in 10/2019 with similar situation 2/2 ICD shocks. MMVT -- multiple rounds of ATP--degenerated the MMVT into VF and the he was unsuccessfully shocked multiple times . He was eventually shocked externally to convert into sinus rhythm .  It was postulated reasons for ineffective therapy (had worsening hypervolemia week prior, length of time in the arrhythmia prior to ICD shock, IV amiodarone causing higher defibrillatory tissue threshold, or change in heart-shock vector post LVAD implant). He had a NIPS which showed successful DFT at 35J. He was discharge after being diuresed. Device check noted on 12/14/19 - one episode of MMVT 270 ms that was appropriately successfully shocked with multiple episodes of non sustained slower VT. Of note he had a monitoring zone added during his admission at 133. VT 1 zone at 167 -- 6 rounds of ATP added before shock in that zone.      TTE demonstrated EF >10%, AV does not open, IVF dilated 2.5cm with respiratory variation >50%, Dilated RV / LV.    Hyperthyroidism  - Likely due to amiodarone per endocrinology.  - US thyroid no nodules   - Continue prednisone 60 mg, methimazole 60 mg BID  - Follow TFT's, next tomorrow    Presence of left ventricular assist device (LVAD)  Anticoagulation - on warfarin 1.8 today. Will increase dose.  If not therapeutic tomorrow, will consider bridge  Procedure: Device Interrogation Including analysis of device parameters  Current Settings: Ventricular Assist Device  Review of device function is stable    TXP LVAD INTERROGATIONS 1/15/2020 1/15/2020 1/15/2020 1/15/2020 1/15/2020 1/15/2020 1/15/2020   Type HeartMate II HeartMate II HeartMate II HeartMate II HeartMate II HeartMate II HeartMate II   Flow 4.6. 4.6 5.3 4.9 4.4 4.4 4.7   Speed 05050 9990 9990 81695 70742 96583 94166   PI 3.3 2.6 3.3 2.2 3.5 3.8 2.3   Power ([place]) 6.2 6.2 6.5 6.2 6.1 5.1 6.4   LSL 9600 9600 9600 9600 9600 9600 9600   Pulsatility Intermittent pulse Intermittent pulse Intermittent pulse Intermittent pulse Intermittent pulse Intermittent pulse Intermittent pulse       Ventricular tachycardia  - Underwent sedation +external shock in ER  - No further events.  - Likely exacerbated due to acute decompensated heart failure and hyperthyroidism; will treat underlying etiology  - Will switch amiodarone from IV to 400 mg BID PO  - ICD settings adjusted by EP   - Would like to hold off on procedures until TFT's normalize    Hypertension  - Continue amlodipine and doxazosin     Acute on chronic systolic congestive heart failure  - Net positive last 24 hours, will increase lasix to 80 mg TID  - Check BMP BID    Transfer to CTSU today    Sanya Zavala MD  Heart Transplant  [facility]

## 2020-01-16 NOTE — NURSING
Last BM 1/12/20.  Patient requests laxative.  Miralax given 1/15/2020 at 0027 hours.  Team notified, orders to follow.

## 2020-01-16 NOTE — ASSESSMENT & PLAN NOTE
-Pt on Amiodarone since 2012   -Currently on Amiodarone drip   -Likely Type 2 Amiodarone hyperthyroidism in the setting of chronic amiodarone and Negative Ab   -TRAb (-)   -Thyroid US from 1/13/20 Showing Thyromegaly and Normal vascularity which is suggestive of Type 2 Amiodarone induced hyperthyroidism   -No clinical evidence of Thyroid storm   -Clinically Euthyroid   -TSH < 0.01,   FT4 3.9  (Hyperthyroid likely 2/2 prednisone non compliance x 1 week)     Plan:   -C/w Prednisone 60mg PO daily   -C/w  Methimazole to 20mg BID   -Follow TSI and TPO Ab   -Repeat TFT Tomorrow 1/17/20  (Labs ordered)           -Due to Type 2 thyroiditis (Treatment is limited to Steroids for antiinflammatory effects on the thyroid gland and Thyroidectomy)   Would recommend normalization of TFT with prednisone and Methimazole prior to surgical evaluation due to degree of hyperthyroidism and patient being a high risk surgical candidate due to advance cardiac disease.

## 2020-01-17 ENCOUNTER — PATIENT MESSAGE (OUTPATIENT)
Dept: TRANSPLANT | Facility: CLINIC | Age: 55
End: 2020-01-17

## 2020-01-17 ENCOUNTER — TELEPHONE (OUTPATIENT)
Dept: TRANSPLANT | Facility: CLINIC | Age: 55
End: 2020-01-17

## 2020-01-17 LAB
ALBUMIN SERPL BCP-MCNC: 3.3 G/DL (ref 3.5–5.2)
ALP SERPL-CCNC: 123 U/L (ref 55–135)
ALT SERPL W/O P-5'-P-CCNC: 29 U/L (ref 10–44)
ANION GAP SERPL CALC-SCNC: 11 MMOL/L (ref 8–16)
ANION GAP SERPL CALC-SCNC: 11 MMOL/L (ref 8–16)
APTT BLDCRRT: 33.7 SEC (ref 21–32)
APTT BLDCRRT: 41 SEC (ref 21–32)
APTT BLDCRRT: 42.8 SEC (ref 21–32)
APTT BLDCRRT: 46 SEC (ref 21–32)
AST SERPL-CCNC: 20 U/L (ref 10–40)
BASOPHILS # BLD AUTO: 0.02 K/UL (ref 0–0.2)
BASOPHILS # BLD AUTO: 0.03 K/UL (ref 0–0.2)
BASOPHILS NFR BLD: 0.2 % (ref 0–1.9)
BASOPHILS NFR BLD: 0.3 % (ref 0–1.9)
BILIRUB DIRECT SERPL-MCNC: 0.2 MG/DL (ref 0.1–0.3)
BILIRUB SERPL-MCNC: 0.4 MG/DL (ref 0.1–1)
BNP SERPL-MCNC: 683 PG/ML (ref 0–99)
BUN SERPL-MCNC: 24 MG/DL (ref 6–20)
BUN SERPL-MCNC: 27 MG/DL (ref 6–20)
CALCIUM SERPL-MCNC: 9.6 MG/DL (ref 8.7–10.5)
CALCIUM SERPL-MCNC: 9.9 MG/DL (ref 8.7–10.5)
CHLORIDE SERPL-SCNC: 101 MMOL/L (ref 95–110)
CHLORIDE SERPL-SCNC: 96 MMOL/L (ref 95–110)
CO2 SERPL-SCNC: 23 MMOL/L (ref 23–29)
CO2 SERPL-SCNC: 29 MMOL/L (ref 23–29)
CREAT SERPL-MCNC: 1.4 MG/DL (ref 0.5–1.4)
CREAT SERPL-MCNC: 1.8 MG/DL (ref 0.5–1.4)
CRP SERPL-MCNC: 7.1 MG/L (ref 0–8.2)
DIFFERENTIAL METHOD: ABNORMAL
DIFFERENTIAL METHOD: ABNORMAL
EOSINOPHIL # BLD AUTO: 0 K/UL (ref 0–0.5)
EOSINOPHIL # BLD AUTO: 0 K/UL (ref 0–0.5)
EOSINOPHIL NFR BLD: 0.3 % (ref 0–8)
EOSINOPHIL NFR BLD: 0.3 % (ref 0–8)
ERYTHROCYTE [DISTWIDTH] IN BLOOD BY AUTOMATED COUNT: 14.3 % (ref 11.5–14.5)
ERYTHROCYTE [DISTWIDTH] IN BLOOD BY AUTOMATED COUNT: 14.6 % (ref 11.5–14.5)
EST. GFR  (AFRICAN AMERICAN): 48.6 ML/MIN/1.73 M^2
EST. GFR  (AFRICAN AMERICAN): >60 ML/MIN/1.73 M^2
EST. GFR  (NON AFRICAN AMERICAN): 42 ML/MIN/1.73 M^2
EST. GFR  (NON AFRICAN AMERICAN): 57 ML/MIN/1.73 M^2
GLUCOSE SERPL-MCNC: 204 MG/DL (ref 70–110)
GLUCOSE SERPL-MCNC: 80 MG/DL (ref 70–110)
HCT VFR BLD AUTO: 47.4 % (ref 40–54)
HCT VFR BLD AUTO: 48 % (ref 40–54)
HGB BLD-MCNC: 14.5 G/DL (ref 14–18)
HGB BLD-MCNC: 14.6 G/DL (ref 14–18)
IMM GRANULOCYTES # BLD AUTO: 0.18 K/UL (ref 0–0.04)
IMM GRANULOCYTES # BLD AUTO: 0.2 K/UL (ref 0–0.04)
IMM GRANULOCYTES NFR BLD AUTO: 1.7 % (ref 0–0.5)
IMM GRANULOCYTES NFR BLD AUTO: 1.9 % (ref 0–0.5)
INR PPP: 1.6 (ref 0.8–1.2)
LDH SERPL L TO P-CCNC: 294 U/L (ref 110–260)
LYMPHOCYTES # BLD AUTO: 1.1 K/UL (ref 1–4.8)
LYMPHOCYTES # BLD AUTO: 1.3 K/UL (ref 1–4.8)
LYMPHOCYTES NFR BLD: 10.8 % (ref 18–48)
LYMPHOCYTES NFR BLD: 11.7 % (ref 18–48)
MAGNESIUM SERPL-MCNC: 2.3 MG/DL (ref 1.6–2.6)
MCH RBC QN AUTO: 25.5 PG (ref 27–31)
MCH RBC QN AUTO: 25.7 PG (ref 27–31)
MCHC RBC AUTO-ENTMCNC: 30.4 G/DL (ref 32–36)
MCHC RBC AUTO-ENTMCNC: 30.6 G/DL (ref 32–36)
MCV RBC AUTO: 84 FL (ref 82–98)
MCV RBC AUTO: 85 FL (ref 82–98)
MONOCYTES # BLD AUTO: 1.1 K/UL (ref 0.3–1)
MONOCYTES # BLD AUTO: 1.2 K/UL (ref 0.3–1)
MONOCYTES NFR BLD: 10.9 % (ref 4–15)
MONOCYTES NFR BLD: 11.1 % (ref 4–15)
NEUTROPHILS # BLD AUTO: 7.9 K/UL (ref 1.8–7.7)
NEUTROPHILS # BLD AUTO: 8 K/UL (ref 1.8–7.7)
NEUTROPHILS NFR BLD: 75 % (ref 38–73)
NEUTROPHILS NFR BLD: 75.8 % (ref 38–73)
NRBC BLD-RTO: 0 /100 WBC
NRBC BLD-RTO: 0 /100 WBC
PHOSPHATE SERPL-MCNC: 3.3 MG/DL (ref 2.7–4.5)
PLATELET # BLD AUTO: 215 K/UL (ref 150–350)
PLATELET # BLD AUTO: 229 K/UL (ref 150–350)
PMV BLD AUTO: 10.4 FL (ref 9.2–12.9)
PMV BLD AUTO: 10.6 FL (ref 9.2–12.9)
POTASSIUM SERPL-SCNC: 3.2 MMOL/L (ref 3.5–5.1)
POTASSIUM SERPL-SCNC: 5.2 MMOL/L (ref 3.5–5.1)
PREALB SERPL-MCNC: 29 MG/DL (ref 20–43)
PROT SERPL-MCNC: 6.8 G/DL (ref 6–8.4)
PROTHROMBIN TIME: 15.2 SEC (ref 9–12.5)
RBC # BLD AUTO: 5.67 M/UL (ref 4.6–6.2)
RBC # BLD AUTO: 5.68 M/UL (ref 4.6–6.2)
SODIUM SERPL-SCNC: 135 MMOL/L (ref 136–145)
SODIUM SERPL-SCNC: 136 MMOL/L (ref 136–145)
T4 FREE SERPL-MCNC: 2.59 NG/DL (ref 0.71–1.51)
WBC # BLD AUTO: 10.45 K/UL (ref 3.9–12.7)
WBC # BLD AUTO: 10.64 K/UL (ref 3.9–12.7)

## 2020-01-17 PROCEDURE — 20600001 HC STEP DOWN PRIVATE ROOM

## 2020-01-17 PROCEDURE — 85025 COMPLETE CBC W/AUTO DIFF WBC: CPT

## 2020-01-17 PROCEDURE — 83735 ASSAY OF MAGNESIUM: CPT

## 2020-01-17 PROCEDURE — 85730 THROMBOPLASTIN TIME PARTIAL: CPT | Mod: 91

## 2020-01-17 PROCEDURE — 93750 INTERROGATION VAD IN PERSON: CPT | Mod: ,,, | Performed by: INTERNAL MEDICINE

## 2020-01-17 PROCEDURE — 99232 PR SUBSEQUENT HOSPITAL CARE,LEVL II: ICD-10-PCS | Mod: ,,, | Performed by: INTERNAL MEDICINE

## 2020-01-17 PROCEDURE — 25000003 PHARM REV CODE 250: Performed by: STUDENT IN AN ORGANIZED HEALTH CARE EDUCATION/TRAINING PROGRAM

## 2020-01-17 PROCEDURE — 63600175 PHARM REV CODE 636 W HCPCS: Performed by: STUDENT IN AN ORGANIZED HEALTH CARE EDUCATION/TRAINING PROGRAM

## 2020-01-17 PROCEDURE — 84134 ASSAY OF PREALBUMIN: CPT

## 2020-01-17 PROCEDURE — 36415 COLL VENOUS BLD VENIPUNCTURE: CPT

## 2020-01-17 PROCEDURE — 83880 ASSAY OF NATRIURETIC PEPTIDE: CPT

## 2020-01-17 PROCEDURE — 80076 HEPATIC FUNCTION PANEL: CPT

## 2020-01-17 PROCEDURE — 80048 BASIC METABOLIC PNL TOTAL CA: CPT | Mod: 91

## 2020-01-17 PROCEDURE — 25000003 PHARM REV CODE 250: Performed by: INTERNAL MEDICINE

## 2020-01-17 PROCEDURE — 85610 PROTHROMBIN TIME: CPT

## 2020-01-17 PROCEDURE — 93750 PR INTERROGATE VENT ASSIST DEV, IN PERSON, W PHYSICIAN ANALYSIS: ICD-10-PCS | Mod: ,,, | Performed by: INTERNAL MEDICINE

## 2020-01-17 PROCEDURE — 84439 ASSAY OF FREE THYROXINE: CPT

## 2020-01-17 PROCEDURE — 25000003 PHARM REV CODE 250: Performed by: GENERAL ACUTE CARE HOSPITAL

## 2020-01-17 PROCEDURE — 63600175 PHARM REV CODE 636 W HCPCS: Performed by: INTERNAL MEDICINE

## 2020-01-17 PROCEDURE — 83615 LACTATE (LD) (LDH) ENZYME: CPT

## 2020-01-17 PROCEDURE — 99233 SBSQ HOSP IP/OBS HIGH 50: CPT | Mod: ,,, | Performed by: INTERNAL MEDICINE

## 2020-01-17 PROCEDURE — 27000248 HC VAD-ADDITIONAL DAY

## 2020-01-17 PROCEDURE — 84100 ASSAY OF PHOSPHORUS: CPT

## 2020-01-17 PROCEDURE — 99232 SBSQ HOSP IP/OBS MODERATE 35: CPT | Mod: ,,, | Performed by: INTERNAL MEDICINE

## 2020-01-17 PROCEDURE — 86140 C-REACTIVE PROTEIN: CPT

## 2020-01-17 PROCEDURE — 99233 PR SUBSEQUENT HOSPITAL CARE,LEVL III: ICD-10-PCS | Mod: ,,, | Performed by: INTERNAL MEDICINE

## 2020-01-17 PROCEDURE — 85730 THROMBOPLASTIN TIME PARTIAL: CPT

## 2020-01-17 RX ORDER — FUROSEMIDE 10 MG/ML
80 INJECTION INTRAMUSCULAR; INTRAVENOUS 2 TIMES DAILY
Status: DISCONTINUED | OUTPATIENT
Start: 2020-01-17 | End: 2020-01-19

## 2020-01-17 RX ORDER — HYDROXYZINE HYDROCHLORIDE 25 MG/1
25 TABLET, FILM COATED ORAL ONCE
Status: COMPLETED | OUTPATIENT
Start: 2020-01-17 | End: 2020-01-17

## 2020-01-17 RX ORDER — ZOLPIDEM TARTRATE 5 MG/1
5 TABLET ORAL NIGHTLY PRN
Status: DISCONTINUED | OUTPATIENT
Start: 2020-01-17 | End: 2020-01-20 | Stop reason: HOSPADM

## 2020-01-17 RX ORDER — METHIMAZOLE 10 MG/1
20 TABLET ORAL DAILY
Status: DISCONTINUED | OUTPATIENT
Start: 2020-01-17 | End: 2020-01-20 | Stop reason: HOSPADM

## 2020-01-17 RX ORDER — METHIMAZOLE 10 MG/1
10 TABLET ORAL NIGHTLY
Status: DISCONTINUED | OUTPATIENT
Start: 2020-01-17 | End: 2020-01-20 | Stop reason: HOSPADM

## 2020-01-17 RX ORDER — POTASSIUM CHLORIDE 20 MEQ/1
40 TABLET, EXTENDED RELEASE ORAL
Status: COMPLETED | OUTPATIENT
Start: 2020-01-17 | End: 2020-01-17

## 2020-01-17 RX ADMIN — METHIMAZOLE 20 MG: 10 TABLET ORAL at 08:01

## 2020-01-17 RX ADMIN — FERROUS GLUCONATE TAB 324 MG (37.5 MG ELEMENTAL IRON) 324 MG: 324 (37.5 FE) TAB at 08:01

## 2020-01-17 RX ADMIN — POTASSIUM CHLORIDE 40 MEQ: 1500 TABLET, EXTENDED RELEASE ORAL at 09:01

## 2020-01-17 RX ADMIN — PREDNISONE 60 MG: 20 TABLET ORAL at 08:01

## 2020-01-17 RX ADMIN — METHIMAZOLE 10 MG: 10 TABLET ORAL at 08:01

## 2020-01-17 RX ADMIN — Medication 400 MG: at 04:01

## 2020-01-17 RX ADMIN — Medication 400 MG: at 08:01

## 2020-01-17 RX ADMIN — DOXAZOSIN 8 MG: 4 TABLET ORAL at 08:01

## 2020-01-17 RX ADMIN — PANTOPRAZOLE SODIUM 40 MG: 40 TABLET, DELAYED RELEASE ORAL at 08:01

## 2020-01-17 RX ADMIN — ZOLPIDEM TARTRATE 5 MG: 5 TABLET ORAL at 08:01

## 2020-01-17 RX ADMIN — AMIODARONE HYDROCHLORIDE 400 MG: 200 TABLET ORAL at 08:01

## 2020-01-17 RX ADMIN — ALLOPURINOL 100 MG: 100 TABLET ORAL at 08:01

## 2020-01-17 RX ADMIN — FUROSEMIDE 80 MG: 10 INJECTION, SOLUTION INTRAVENOUS at 05:01

## 2020-01-17 RX ADMIN — ACETAMINOPHEN 650 MG: 325 TABLET ORAL at 09:01

## 2020-01-17 RX ADMIN — WARFARIN SODIUM 5 MG: 5 TABLET ORAL at 04:01

## 2020-01-17 RX ADMIN — AMLODIPINE BESYLATE 10 MG: 10 TABLET ORAL at 04:01

## 2020-01-17 RX ADMIN — HYDROXYZINE HYDROCHLORIDE 25 MG: 25 TABLET, FILM COATED ORAL at 12:01

## 2020-01-17 RX ADMIN — POTASSIUM CHLORIDE 40 MEQ: 1500 TABLET, EXTENDED RELEASE ORAL at 08:01

## 2020-01-17 RX ADMIN — ACETAMINOPHEN 650 MG: 325 TABLET ORAL at 08:01

## 2020-01-17 RX ADMIN — SENNOSIDES AND DOCUSATE SODIUM 1 TABLET: 8.6; 5 TABLET ORAL at 08:01

## 2020-01-17 RX ADMIN — HEPARIN SODIUM AND DEXTROSE 15 UNITS/KG/HR: 10000; 5 INJECTION INTRAVENOUS at 08:01

## 2020-01-17 RX ADMIN — FUROSEMIDE 80 MG: 10 INJECTION, SOLUTION INTRAVENOUS at 08:01

## 2020-01-17 NOTE — PROGRESS NOTES
[facility]  Heart Transplant  Progress Note    Patient Name: Tim Richards  MRN: [id number]  Admission Date: 1/11/2020  Hospital Length of Stay: 6 days  Attending Physician: Bettye Connors MD  Primary Care Provider: Primary Doctor No  Principal Problem:<principal problem not specified>    Subjective:     Interval History: Feels well today. No issues overnight, no VT.     Continuous Infusions:   heparin (porcine) in D5W 15 Units/kg/hr (01/17/20 0434)     Scheduled Meds:   allopurinol  100 mg Oral Daily    amiodarone  400 mg Oral BID    amLODIPine  10 mg Oral Daily    doxazosin  8 mg Oral Q12H    ferrous gluconate  324 mg Oral Daily with breakfast    furosemide  80 mg Intravenous BID    magnesium oxide  400 mg Oral TID    methIMAzole  10 mg Oral QHS    methIMAzole  20 mg Oral Daily    pantoprazole  40 mg Oral Daily    polyethylene glycol  17 g Oral Daily    predniSONE  60 mg Oral Daily    senna-docusate 8.6-50 mg  1 tablet Oral BID    warfarin  5 mg Oral Daily     PRN Meds:acetaminophen, melatonin, ondansetron, oxyCODONE    Review of patient's allergies indicates:   Allergen Reactions    Lisinopril Anaphylaxis     Objective:     Vital Signs (Most Recent):  Temp: 97.4 °F (36.3 °C) (01/17/20 0733)  Pulse: 72 (01/17/20 0807)  Resp: 16 (01/17/20 0733)  BP: (!) 90/0 (01/17/20 0740)  SpO2: (!) 93 % (01/17/20 0733) Vital Signs (24h Range):  Temp:  [97.4 °F (36.3 °C)-98.8 °F (37.1 °C)] 97.4 °F (36.3 °C)  Pulse:  [65-80] 72  Resp:  [16-30] 16  SpO2:  [91 %-98 %] 93 %  BP: ()/(0-80) 90/0     Patient Vitals for the past 72 hrs (Last 3 readings):   Weight   01/17/20 0500 116.4 kg (256 lb 9.9 oz)   01/16/20 1100 115.8 kg (255 lb 2.9 oz)     Body mass index is 33.86 kg/m².      Intake/Output Summary (Last 24 hours) at 1/17/2020 1105  Last data filed at 1/17/2020 0500  Gross per 24 hour   Intake 1385.19 ml   Output 1500 ml   Net -114.81 ml         Physical Exam   Constitutional: He is  oriented to person, place, and time. He appears well-developed.   Neck: No JVD (6-7 cm H2O at 30 degrees) present.   Cardiovascular: Normal rate.   Murmur (VAD) heard.  Pulmonary/Chest: Effort normal and breath sounds normal.   Abdominal: Soft. Bowel sounds are normal.   Musculoskeletal: Normal range of motion. He exhibits no edema.   Neurological: He is alert and oriented to person, place, and time. No cranial nerve deficit.   Skin: Skin is warm and dry. Capillary refill takes less than 2 seconds.       Significant Labs:  CBC:  Recent Labs   Lab 01/15/20  0345 01/16/20  0500 01/17/20  0534   WBC 8.80 8.65 10.45  10.64   RBC 5.51 5.52 5.67  5.68   HGB 14.0 14.5 14.6  14.5   HCT 46.1 45.9 48.0  47.4    213 229  215   MCV 84 83 85  84   MCH 25.4* 26.3* 25.7*  25.5*   MCHC 30.4* 31.6* 30.4*  30.6*     BNP:  Recent Labs   Lab 01/13/20  0318 01/15/20  0345 01/17/20  0534   BNP 1,032* 551* 683*     CMP:  Recent Labs   Lab 01/13/20  0318  01/15/20  0345  01/16/20  0500 01/16/20  0859 01/16/20  1803 01/17/20  0534      < > 111*   < > 80  --  139* 80   CALCIUM 9.1   < > 9.6   < > 9.5  --  9.5 9.6   ALBUMIN 3.4*  --  3.5  --   --   --   --  3.3*   PROT 6.9  --  7.4  --   --   --   --  6.8      < > 137   < > 133*  --  133* 136   K 3.8   < > 3.7   < > 3.2* 4.2 4.2 3.2*   CO2 28   < > 30*   < > 32*  --  29 29      < > 95   < > 93*  --  95 96   BUN 22*   < > 23*   < > 24*  --  24* 24*   CREATININE 1.5*   < > 1.6*   < > 1.4  --  1.7* 1.4   ALKPHOS 133  --  135  --   --   --   --  123   ALT 47*  --  38  --   --   --   --  29   AST 34  --  30  --   --   --   --  20   BILITOT 0.4  --  0.4  --   --   --   --  0.4    < > = values in this interval not displayed.      Coagulation:   Recent Labs   Lab 01/16/20  0500 01/16/20  1140 01/16/20  2309 01/17/20  0534   INR 1.6* 1.5*  --  1.6*   APTT 29.8 29.8 33.7* 46.0*  42.8*     LDH:  Recent Labs   Lab 01/15/20  0345 01/16/20  0500 01/17/20  0534   *  272* 294*     Microbiology:  Microbiology Results (last 7 days)     ** No results found for the last 168 hours. **          I have reviewed all pertinent labs within the past 24 hours.    Estimated Creatinine Clearance: 81.6 mL/min (based on SCr of 1.4 mg/dL).    Diagnostic Results:  I have reviewed all pertinent imaging results/findings within the past 24 hours.    Assessment and Plan:     Tim Richards is a 54 y.o. M with DCM,(EF 10%, grade III DD) s/p HM II with Outflow graft changed (03/9/18) as DT, BiV-Icd Medtronic (2014), HtN, obesity transferred from  for VT now in VF, shocked by device x 7.  Has been taking prednisone for hyperthyroidism which has caused him to eat/retain fluid and gain 13lbs as of late.  On exam he is decompensated, k 3.4,  higher than prior, creat 1.9 around baseline, INR 5.3.     He was admitted to the hospital in 10/2019 with similar situation 2/2 ICD shocks. MMVT -- multiple rounds of ATP--degenerated the MMVT into VF and the he was unsuccessfully shocked multiple times . He was eventually shocked externally to convert into sinus rhythm .  It was postulated reasons for ineffective therapy (had worsening hypervolemia week prior, length of time in the arrhythmia prior to ICD shock, IV amiodarone causing higher defibrillatory tissue threshold, or change in heart-shock vector post LVAD implant). He had a NIPS which showed successful DFT at 35J. He was discharge after being diuresed. Device check noted on 12/14/19 - one episode of MMVT 270 ms that was appropriately successfully shocked with multiple episodes of non sustained slower VT. Of note he had a monitoring zone added during his admission at 133. VT 1 zone at 167 -- 6 rounds of ATP added before shock in that zone.      TTE demonstrated EF >10%, AV does not open, IVF dilated 2.5cm with respiratory variation >50%, Dilated RV / LV.    Hyperthyroidism  - Likely due to amiodarone per endocrinology.  - US thyroid no nodules    - Continue prednisone 60 mg  - Methimazole decreased to 20/10  - TFT better this AM but still elevated  - Discussed with endo, plan is to stop methimazole once fT4 normalizes and then start weaning prednisone. Can be done as outpatient. Will plan on DC on Monday    Presence of left ventricular assist device (LVAD)  Anticoagulation - on warfarin and heparin bridge  Procedure: Device Interrogation Including analysis of device parameters  Current Settings: Ventricular Assist Device  Review of device function is stable    TXP LVAD INTERROGATIONS 1/17/2020 1/17/2020 1/17/2020 1/16/2020 1/16/2020 1/16/2020 1/16/2020   Type HeartMate II HeartMate II HeartMate II HeartMate II HeartMate II HeartMate II HeartMate II   Flow 4.8 5.0 5.5 5.4 5.1 5.6 5.0   Speed 23391 58753 17481 76129 77897 33688 26325   PI 4.1 4.0 3.5 4.6 4.1 4.4 3.8   Power (Jackson) 6.3 6.5 6.7 6.7 6.6 6.8 6.4   LSL 9600 9600 9600 9600 9600 9600 26596   Pulsatility Pulse Pulse Pulse Pulse Pulse Intermittent pulse Intermittent pulse       Ventricular tachycardia  - Underwent sedation +external shock in ER  - No further events.  - Likely exacerbated due to acute decompensated heart failure and hyperthyroidism; will treat underlying etiology  - Will switch amiodarone from IV to 400 mg BID PO  - ICD settings adjusted by EP   - Would like to hold off on procedures until TFT's normalize, will discharge and plan for procedure as outpatient    Hypertension  - Continue amlodipine and doxazosin     Acute on chronic systolic congestive heart failure  - Lasix back to 80 mg VI BID, plan to deescalate to PO over the weekend for possible DC on Monday  - Check BMP BID        Sanya Zavala MD  Heart Transplant  [facility]

## 2020-01-17 NOTE — SUBJECTIVE & OBJECTIVE
"Interval HPI:   Pt is AAOX4, Clinically Euthyroid. HR 80s, Tmax 98.  N tachyarrhythmias, NO Fevers.  FT4  2.59(Improving from 3.91).       Overnight events:  NINA   Eatin%  Nausea: No  Hypoglycemia and intervention: No  Fever: No  TPN and/or TF: No    BP (!) 90/0 (BP Location: Left arm, Patient Position: Lying)   Pulse 72   Temp 97.4 °F (36.3 °C) (Oral)   Resp 16   Ht 6' 1" (1.854 m)   Wt 116.4 kg (256 lb 9.9 oz)   SpO2 (!) 93%   BMI 33.86 kg/m²     Labs Reviewed and Include    Recent Labs   Lab 20  0534   GLU 80   CALCIUM 9.6   ALBUMIN 3.3*   PROT 6.8      K 3.2*   CO2 29   CL 96   BUN 24*   CREATININE 1.4   ALKPHOS 123   ALT 29   AST 20   BILITOT 0.4     Lab Results   Component Value Date    WBC 10.64 2020    WBC 10.45 2020    HGB 14.5 2020    HGB 14.6 2020    HCT 47.4 2020    HCT 48.0 2020    MCV 84 2020    MCV 85 2020     2020     2020     Recent Labs   Lab 20  2135 20  0929 20  0534   TSH <0.010* <0.010*  --    FREET4 3.91* 3.91* 2.59*     Lab Results   Component Value Date    HGBA1C 5.5 2018       Nutritional status:   Body mass index is 33.86 kg/m².  Lab Results   Component Value Date    ALBUMIN 3.3 (L) 2020    ALBUMIN 3.5 01/15/2020    ALBUMIN 3.4 (L) 2020     Lab Results   Component Value Date    PREALBUMIN 29 2020    PREALBUMIN 28 01/15/2020    PREALBUMIN 24 2020       Estimated Creatinine Clearance: 81.6 mL/min (based on SCr of 1.4 mg/dL).    Accu-Checks  No results for input(s): POCTGLUCOSE in the last 72 hours.    Current Medications and/or Treatments Impacting Glycemic Control  Immunotherapy:    Immunosuppressants     None        Steroids:   Hormones (From admission, onward)    Start     Stop Route Frequency Ordered    01/15/20 001  melatonin tablet 6 mg      -- Oral Nightly PRN 01/15/20 0016    20 0900  predniSONE tablet 60 mg      -- Oral Daily " 01/12/20 0209        Pressors:    Autonomic Drugs (From admission, onward)    None        Hyperglycemia/Diabetes Medications:   Antihyperglycemics (From admission, onward)    None

## 2020-01-17 NOTE — TELEPHONE ENCOUNTER
"Patient's wife paged VAD coordinator on call stating that bedside RN did not explain patient's PM medications to him, and that she reports that "no one has been monitoring his EKG". Patient is currently on tele. Asked wife to notify charge RN of events. Wife agrees that she will.     1/17/2020 0922- followed up with charge RN, Karissa, and notified of events. Karissa states that she will round today on patient and address complaints if they were not addressed yesterday.   "

## 2020-01-17 NOTE — ASSESSMENT & PLAN NOTE
- Lasix back to 80 mg VI BID, plan to deescalate to PO over the weekend for possible DC on Monday  - Check BMP BID

## 2020-01-17 NOTE — ASSESSMENT & PLAN NOTE
- Underwent sedation +external shock in ER  - No further events.  - Likely exacerbated due to acute decompensated heart failure and hyperthyroidism; will treat underlying etiology  - Will switch amiodarone from IV to 400 mg BID PO  - ICD settings adjusted by EP   - Would like to hold off on procedures until TFT's normalize, will discharge and plan for procedure as outpatient

## 2020-01-17 NOTE — NURSING
Telemetry called to report run of VTACH.  Monitor reveals 15 beats.  Patient is asymptomatic.  CROW Zavala. notified.

## 2020-01-17 NOTE — NURSING
Notified resident on call, pt still unable to sleep. Orders for 1 time dose of hydroxyzine received and entered from TASHIA Manning MD. RBENDA

## 2020-01-17 NOTE — ASSESSMENT & PLAN NOTE
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            -Due to Type 2 thyroiditis (Treatment is limited to Steroids for antiinflammatory effects on the thyroid gland and Thyroidectomy)   Would recommend normalization of TFT with prednisone and Methimazole prior to surgical evaluation due to degree of hyperthyroidism and patient being a high risk surgical candidate due to advance cardiac disease.

## 2020-01-17 NOTE — NURSING
"Patient requests sleep aid.  Melatonin 6 mg oral nightly insomnia given at 2256 hours did not work per patient report.  Patient was also given Hydoxyzine HCL 25 mg oral once for anxiety.  Patient stated, "I just want something to knock me out to go to sleep."  JANAK Zavala notified, orders to follow.  "

## 2020-01-17 NOTE — SUBJECTIVE & OBJECTIVE
Interval History: Feels well today. No issues overnight, no VT.     Continuous Infusions:   heparin (porcine) in D5W 15 Units/kg/hr (01/17/20 0434)     Scheduled Meds:   allopurinol  100 mg Oral Daily    amiodarone  400 mg Oral BID    amLODIPine  10 mg Oral Daily    doxazosin  8 mg Oral Q12H    ferrous gluconate  324 mg Oral Daily with breakfast    furosemide  80 mg Intravenous BID    magnesium oxide  400 mg Oral TID    methIMAzole  10 mg Oral QHS    methIMAzole  20 mg Oral Daily    pantoprazole  40 mg Oral Daily    polyethylene glycol  17 g Oral Daily    predniSONE  60 mg Oral Daily    senna-docusate 8.6-50 mg  1 tablet Oral BID    warfarin  5 mg Oral Daily     PRN Meds:acetaminophen, melatonin, ondansetron, oxyCODONE    Review of patient's allergies indicates:   Allergen Reactions    Lisinopril Anaphylaxis     Objective:     Vital Signs (Most Recent):  Temp: 97.4 °F (36.3 °C) (01/17/20 0733)  Pulse: 72 (01/17/20 0807)  Resp: 16 (01/17/20 0733)  BP: (!) 90/0 (01/17/20 0740)  SpO2: (!) 93 % (01/17/20 0733) Vital Signs (24h Range):  Temp:  [97.4 °F (36.3 °C)-98.8 °F (37.1 °C)] 97.4 °F (36.3 °C)  Pulse:  [65-80] 72  Resp:  [16-30] 16  SpO2:  [91 %-98 %] 93 %  BP: ()/(0-80) 90/0     Patient Vitals for the past 72 hrs (Last 3 readings):   Weight   01/17/20 0500 116.4 kg (256 lb 9.9 oz)   01/16/20 1100 115.8 kg (255 lb 2.9 oz)     Body mass index is 33.86 kg/m².      Intake/Output Summary (Last 24 hours) at 1/17/2020 1105  Last data filed at 1/17/2020 0500  Gross per 24 hour   Intake 1385.19 ml   Output 1500 ml   Net -114.81 ml         Physical Exam   Constitutional: He is oriented to person, place, and time. He appears well-developed.   Neck: No JVD (6-7 cm H2O at 30 degrees) present.   Cardiovascular: Normal rate.   Murmur (VAD) heard.  Pulmonary/Chest: Effort normal and breath sounds normal.   Abdominal: Soft. Bowel sounds are normal.   Musculoskeletal: Normal range of motion. He exhibits no  edema.   Neurological: He is alert and oriented to person, place, and time. No cranial nerve deficit.   Skin: Skin is warm and dry. Capillary refill takes less than 2 seconds.       Significant Labs:  CBC:  Recent Labs   Lab 01/15/20  0345 01/16/20  0500 01/17/20  0534   WBC 8.80 8.65 10.45  10.64   RBC 5.51 5.52 5.67  5.68   HGB 14.0 14.5 14.6  14.5   HCT 46.1 45.9 48.0  47.4    213 229  215   MCV 84 83 85  84   MCH 25.4* 26.3* 25.7*  25.5*   MCHC 30.4* 31.6* 30.4*  30.6*     BNP:  Recent Labs   Lab 01/13/20  0318 01/15/20  0345 01/17/20  0534   BNP 1,032* 551* 683*     CMP:  Recent Labs   Lab 01/13/20  0318  01/15/20  0345  01/16/20  0500 01/16/20  0859 01/16/20  1803 01/17/20  0534      < > 111*   < > 80  --  139* 80   CALCIUM 9.1   < > 9.6   < > 9.5  --  9.5 9.6   ALBUMIN 3.4*  --  3.5  --   --   --   --  3.3*   PROT 6.9  --  7.4  --   --   --   --  6.8      < > 137   < > 133*  --  133* 136   K 3.8   < > 3.7   < > 3.2* 4.2 4.2 3.2*   CO2 28   < > 30*   < > 32*  --  29 29      < > 95   < > 93*  --  95 96   BUN 22*   < > 23*   < > 24*  --  24* 24*   CREATININE 1.5*   < > 1.6*   < > 1.4  --  1.7* 1.4   ALKPHOS 133  --  135  --   --   --   --  123   ALT 47*  --  38  --   --   --   --  29   AST 34  --  30  --   --   --   --  20   BILITOT 0.4  --  0.4  --   --   --   --  0.4    < > = values in this interval not displayed.      Coagulation:   Recent Labs   Lab 01/16/20  0500 01/16/20  1140 01/16/20  2309 01/17/20  0534   INR 1.6* 1.5*  --  1.6*   APTT 29.8 29.8 33.7* 46.0*  42.8*     LDH:  Recent Labs   Lab 01/15/20  0345 01/16/20  0500 01/17/20  0534   * 272* 294*     Microbiology:  Microbiology Results (last 7 days)     ** No results found for the last 168 hours. **          I have reviewed all pertinent labs within the past 24 hours.    Estimated Creatinine Clearance: 81.6 mL/min (based on SCr of 1.4 mg/dL).    Diagnostic Results:  I have reviewed all pertinent imaging  results/findings within the past 24 hours.

## 2020-01-17 NOTE — PROGRESS NOTES
"[facility]  Endocrinology  Progress Note    Admit Date: 2020     55 YO Male w/ diagnosis of Amiodarone induced hyperthyroidism Type 2, HTN ,DCM,(EF 10%, grade III DD) s/p HM II with Outflow graft changed (18) as DT, BiV-Icd Medtronic () and obesity transferred from  for VT now in VF, shocked by device x 7 times.  Pt was admitted to Cardiology unit.  During hospital course patient noted to have TSH < 0.01 and FT4 3.9.  Pt is being followed outpatient with endocrinology for Amiodarone induced hyperthyroidism Type 2 since .  Pt was started on Prednisone 60mg and Methimazole 20mg qam and 10mg qPM.  Pt has been having fluctuating TFT in the past due to prednisone non compliance.  Pt states he ran out of Prednisone x 1 week prior to admission due to running out of medications.  Pt endorses tremors, palpitations and diaphoresis since stopping Prednisone.  Pt was consulted with endocrinology for management of hyperthyroidism.      Interval HPI:   Pt is AAOX4, Clinically Euthyroid. HR 80s, Tmax 98.  N tachyarrhythmias, NO Fevers.  FT4  2.59(Improving from 3.91).       Overnight events:  NINA   Eatin%  Nausea: No  Hypoglycemia and intervention: No  Fever: No  TPN and/or TF: No    BP (!) 90/0 (BP Location: Left arm, Patient Position: Lying)   Pulse 72   Temp 97.4 °F (36.3 °C) (Oral)   Resp 16   Ht 6' 1" (1.854 m)   Wt 116.4 kg (256 lb 9.9 oz)   SpO2 (!) 93%   BMI 33.86 kg/m²      Labs Reviewed and Include    Recent Labs   Lab 20  0534   GLU 80   CALCIUM 9.6   ALBUMIN 3.3*   PROT 6.8      K 3.2*   CO2 29   CL 96   BUN 24*   CREATININE 1.4   ALKPHOS 123   ALT 29   AST 20   BILITOT 0.4     Lab Results   Component Value Date    WBC 10.64 2020    WBC 10.45 2020    HGB 14.5 2020    HGB 14.6 2020    HCT 47.4 2020    HCT 48.0 2020    MCV 84 2020    MCV 85 2020     2020     2020     Recent Labs "   Lab 01/11/20  2135 01/14/20  0929 01/17/20  0534   TSH <0.010* <0.010*  --    FREET4 3.91* 3.91* 2.59*     Lab Results   Component Value Date    HGBA1C 5.5 03/08/2018       Nutritional status:   Body mass index is 33.86 kg/m².  Lab Results   Component Value Date    ALBUMIN 3.3 (L) 01/17/2020    ALBUMIN 3.5 01/15/2020    ALBUMIN 3.4 (L) 01/13/2020     Lab Results   Component Value Date    PREALBUMIN 29 01/17/2020    PREALBUMIN 28 01/15/2020    PREALBUMIN 24 01/13/2020       Estimated Creatinine Clearance: 81.6 mL/min (based on SCr of 1.4 mg/dL).    Accu-Checks  No results for input(s): POCTGLUCOSE in the last 72 hours.    Current Medications and/or Treatments Impacting Glycemic Control  Immunotherapy:    Immunosuppressants     None        Steroids:   Hormones (From admission, onward)    Start     Stop Route Frequency Ordered    01/15/20 0015  melatonin tablet 6 mg      -- Oral Nightly PRN 01/15/20 0016    01/12/20 0900  predniSONE tablet 60 mg      -- Oral Daily 01/12/20 0209        Pressors:    Autonomic Drugs (From admission, onward)    None        Hyperglycemia/Diabetes Medications:   Antihyperglycemics (From admission, onward)    None          ASSESSMENT and PLAN    VF (ventricular fibrillation)  -Managed by cardiology   -Pt with Amiodarone induced hyperthyroidism Type 2   (Unable to be off amiodarone due to repeated Vtach episodes, benefits outweigh the risk)   -Will manage hyperthyroidism with Prednisone and Methimazole         Amiodarone-induced hyperthyroidism  -Pt on Amiodarone since 2012   -Currently on Amiodarone drip   -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            -Due to Type 2 thyroiditis (Treatment is limited to Steroids for antiinflammatory effects on the thyroid gland and Thyroidectomy)   Would recommend normalization of TFT with prednisone and Methimazole prior to surgical evaluation due to degree of hyperthyroidism and patient being a high risk surgical candidate due to advance cardiac disease.     Acute on chronic systolic congestive heart failure  -Managed by primary team           Dalton Chance MD  Endocrinology  [facility]

## 2020-01-17 NOTE — PROGRESS NOTES
01/17/2020  Fitz Christianson    Current provider:  Bettye Connors MD      I, Fitz Christianson, rounded on Tim Richards to ensure all mechanical assist device settings (IABP or VAD) were appropriate and all parameters were within limits.  I was able to ensure all back up equipment was present, the staff had no issues, and the Perfusion Department daily rounding was complete.    9:19 AM

## 2020-01-17 NOTE — NURSING
Patient reports right lower mouth pain from cutting gum from something eaten (missing tooth in same area).  Tylenol 650 mg given at 0906 hours.

## 2020-01-17 NOTE — ASSESSMENT & PLAN NOTE
Anticoagulation - on warfarin and heparin bridge  Procedure: Device Interrogation Including analysis of device parameters  Current Settings: Ventricular Assist Device  Review of device function is stable    TXP LVAD INTERROGATIONS 1/17/2020 1/17/2020 1/17/2020 1/16/2020 1/16/2020 1/16/2020 1/16/2020   Type HeartMate II HeartMate II HeartMate II HeartMate II HeartMate II HeartMate II HeartMate II   Flow 4.8 5.0 5.5 5.4 5.1 5.6 5.0   Speed 63560 95752 95987 73177 17895 64838 65066   PI 4.1 4.0 3.5 4.6 4.1 4.4 3.8   Power (Jackson) 6.3 6.5 6.7 6.7 6.6 6.8 6.4   LSL 9600 9600 9600 9600 9600 9600 47219   Pulsatility Pulse Pulse Pulse Pulse Pulse Intermittent pulse Intermittent pulse

## 2020-01-18 LAB
ANION GAP SERPL CALC-SCNC: 12 MMOL/L (ref 8–16)
ANION GAP SERPL CALC-SCNC: 12 MMOL/L (ref 8–16)
APTT BLDCRRT: 44.9 SEC (ref 21–32)
APTT BLDCRRT: 45.1 SEC (ref 21–32)
BASOPHILS # BLD AUTO: 0.05 K/UL (ref 0–0.2)
BASOPHILS NFR BLD: 0.4 % (ref 0–1.9)
BUN SERPL-MCNC: 26 MG/DL (ref 6–20)
BUN SERPL-MCNC: 27 MG/DL (ref 6–20)
CALCIUM SERPL-MCNC: 10 MG/DL (ref 8.7–10.5)
CALCIUM SERPL-MCNC: 9.9 MG/DL (ref 8.7–10.5)
CHLORIDE SERPL-SCNC: 100 MMOL/L (ref 95–110)
CHLORIDE SERPL-SCNC: 99 MMOL/L (ref 95–110)
CO2 SERPL-SCNC: 26 MMOL/L (ref 23–29)
CO2 SERPL-SCNC: 28 MMOL/L (ref 23–29)
CREAT SERPL-MCNC: 1.6 MG/DL (ref 0.5–1.4)
CREAT SERPL-MCNC: 1.7 MG/DL (ref 0.5–1.4)
DIFFERENTIAL METHOD: ABNORMAL
EOSINOPHIL # BLD AUTO: 0 K/UL (ref 0–0.5)
EOSINOPHIL NFR BLD: 0.2 % (ref 0–8)
ERYTHROCYTE [DISTWIDTH] IN BLOOD BY AUTOMATED COUNT: 14.6 % (ref 11.5–14.5)
EST. GFR  (AFRICAN AMERICAN): 52.1 ML/MIN/1.73 M^2
EST. GFR  (AFRICAN AMERICAN): 56 ML/MIN/1.73 M^2
EST. GFR  (NON AFRICAN AMERICAN): 45 ML/MIN/1.73 M^2
EST. GFR  (NON AFRICAN AMERICAN): 48.5 ML/MIN/1.73 M^2
GLUCOSE SERPL-MCNC: 158 MG/DL (ref 70–110)
GLUCOSE SERPL-MCNC: 79 MG/DL (ref 70–110)
HCT VFR BLD AUTO: 48.1 % (ref 40–54)
HGB BLD-MCNC: 14.4 G/DL (ref 14–18)
IMM GRANULOCYTES # BLD AUTO: 0.34 K/UL (ref 0–0.04)
IMM GRANULOCYTES NFR BLD AUTO: 2.6 % (ref 0–0.5)
INR PPP: 1.7 (ref 0.8–1.2)
LDH SERPL L TO P-CCNC: 306 U/L (ref 110–260)
LYMPHOCYTES # BLD AUTO: 1.2 K/UL (ref 1–4.8)
LYMPHOCYTES NFR BLD: 9 % (ref 18–48)
MAGNESIUM SERPL-MCNC: 2.4 MG/DL (ref 1.6–2.6)
MCH RBC QN AUTO: 25.8 PG (ref 27–31)
MCHC RBC AUTO-ENTMCNC: 29.9 G/DL (ref 32–36)
MCV RBC AUTO: 86 FL (ref 82–98)
MONOCYTES # BLD AUTO: 1.3 K/UL (ref 0.3–1)
MONOCYTES NFR BLD: 10.1 % (ref 4–15)
NEUTROPHILS # BLD AUTO: 10 K/UL (ref 1.8–7.7)
NEUTROPHILS NFR BLD: 77.7 % (ref 38–73)
NRBC BLD-RTO: 0 /100 WBC
PHOSPHATE SERPL-MCNC: 3.4 MG/DL (ref 2.7–4.5)
PLATELET # BLD AUTO: 217 K/UL (ref 150–350)
PMV BLD AUTO: 10.7 FL (ref 9.2–12.9)
POTASSIUM SERPL-SCNC: 4 MMOL/L (ref 3.5–5.1)
POTASSIUM SERPL-SCNC: 4.7 MMOL/L (ref 3.5–5.1)
PROTHROMBIN TIME: 16.8 SEC (ref 9–12.5)
RBC # BLD AUTO: 5.59 M/UL (ref 4.6–6.2)
SODIUM SERPL-SCNC: 137 MMOL/L (ref 136–145)
SODIUM SERPL-SCNC: 140 MMOL/L (ref 136–145)
WBC # BLD AUTO: 12.84 K/UL (ref 3.9–12.7)

## 2020-01-18 PROCEDURE — 25000003 PHARM REV CODE 250: Performed by: STUDENT IN AN ORGANIZED HEALTH CARE EDUCATION/TRAINING PROGRAM

## 2020-01-18 PROCEDURE — 36415 COLL VENOUS BLD VENIPUNCTURE: CPT

## 2020-01-18 PROCEDURE — 25000003 PHARM REV CODE 250: Performed by: INTERNAL MEDICINE

## 2020-01-18 PROCEDURE — 99233 SBSQ HOSP IP/OBS HIGH 50: CPT | Mod: ,,, | Performed by: INTERNAL MEDICINE

## 2020-01-18 PROCEDURE — 93750 INTERROGATION VAD IN PERSON: CPT | Mod: ,,, | Performed by: INTERNAL MEDICINE

## 2020-01-18 PROCEDURE — 63600175 PHARM REV CODE 636 W HCPCS: Performed by: STUDENT IN AN ORGANIZED HEALTH CARE EDUCATION/TRAINING PROGRAM

## 2020-01-18 PROCEDURE — 83615 LACTATE (LD) (LDH) ENZYME: CPT

## 2020-01-18 PROCEDURE — 20600001 HC STEP DOWN PRIVATE ROOM

## 2020-01-18 PROCEDURE — 25000003 PHARM REV CODE 250: Performed by: GENERAL ACUTE CARE HOSPITAL

## 2020-01-18 PROCEDURE — 85610 PROTHROMBIN TIME: CPT

## 2020-01-18 PROCEDURE — 85730 THROMBOPLASTIN TIME PARTIAL: CPT

## 2020-01-18 PROCEDURE — 93750 PR INTERROGATE VENT ASSIST DEV, IN PERSON, W PHYSICIAN ANALYSIS: ICD-10-PCS | Mod: ,,, | Performed by: INTERNAL MEDICINE

## 2020-01-18 PROCEDURE — 63600175 PHARM REV CODE 636 W HCPCS: Performed by: INTERNAL MEDICINE

## 2020-01-18 PROCEDURE — 99233 PR SUBSEQUENT HOSPITAL CARE,LEVL III: ICD-10-PCS | Mod: ,,, | Performed by: INTERNAL MEDICINE

## 2020-01-18 PROCEDURE — 84100 ASSAY OF PHOSPHORUS: CPT

## 2020-01-18 PROCEDURE — 80048 BASIC METABOLIC PNL TOTAL CA: CPT

## 2020-01-18 PROCEDURE — 83735 ASSAY OF MAGNESIUM: CPT

## 2020-01-18 PROCEDURE — 85730 THROMBOPLASTIN TIME PARTIAL: CPT | Mod: 91

## 2020-01-18 PROCEDURE — 85025 COMPLETE CBC W/AUTO DIFF WBC: CPT

## 2020-01-18 PROCEDURE — 27000248 HC VAD-ADDITIONAL DAY

## 2020-01-18 RX ORDER — LACTULOSE 10 G/15ML
20 SOLUTION ORAL ONCE
Status: COMPLETED | OUTPATIENT
Start: 2020-01-18 | End: 2020-01-18

## 2020-01-18 RX ADMIN — ALLOPURINOL 100 MG: 100 TABLET ORAL at 09:01

## 2020-01-18 RX ADMIN — PANTOPRAZOLE SODIUM 40 MG: 40 TABLET, DELAYED RELEASE ORAL at 08:01

## 2020-01-18 RX ADMIN — SENNOSIDES AND DOCUSATE SODIUM 1 TABLET: 8.6; 5 TABLET ORAL at 08:01

## 2020-01-18 RX ADMIN — HEPARIN SODIUM AND DEXTROSE 15 UNITS/KG/HR: 10000; 5 INJECTION INTRAVENOUS at 04:01

## 2020-01-18 RX ADMIN — FUROSEMIDE 80 MG: 10 INJECTION, SOLUTION INTRAVENOUS at 06:01

## 2020-01-18 RX ADMIN — FERROUS GLUCONATE TAB 324 MG (37.5 MG ELEMENTAL IRON) 324 MG: 324 (37.5 FE) TAB at 08:01

## 2020-01-18 RX ADMIN — DOXAZOSIN 8 MG: 4 TABLET ORAL at 08:01

## 2020-01-18 RX ADMIN — LACTULOSE 20 G: 20 SOLUTION ORAL at 09:01

## 2020-01-18 RX ADMIN — DOXAZOSIN 8 MG: 4 TABLET ORAL at 09:01

## 2020-01-18 RX ADMIN — AMLODIPINE BESYLATE 10 MG: 10 TABLET ORAL at 02:01

## 2020-01-18 RX ADMIN — ZOLPIDEM TARTRATE 5 MG: 5 TABLET ORAL at 09:01

## 2020-01-18 RX ADMIN — AMIODARONE HYDROCHLORIDE 400 MG: 200 TABLET ORAL at 09:01

## 2020-01-18 RX ADMIN — Medication 400 MG: at 09:01

## 2020-01-18 RX ADMIN — Medication 400 MG: at 02:01

## 2020-01-18 RX ADMIN — Medication 400 MG: at 08:01

## 2020-01-18 RX ADMIN — ACETAMINOPHEN 650 MG: 325 TABLET ORAL at 09:01

## 2020-01-18 RX ADMIN — FUROSEMIDE 80 MG: 10 INJECTION, SOLUTION INTRAVENOUS at 02:01

## 2020-01-18 RX ADMIN — METHIMAZOLE 10 MG: 10 TABLET ORAL at 09:01

## 2020-01-18 RX ADMIN — WARFARIN SODIUM 5 MG: 5 TABLET ORAL at 04:01

## 2020-01-18 RX ADMIN — PREDNISONE 60 MG: 20 TABLET ORAL at 08:01

## 2020-01-18 RX ADMIN — SENNOSIDES AND DOCUSATE SODIUM 1 TABLET: 8.6; 5 TABLET ORAL at 09:01

## 2020-01-18 RX ADMIN — AMIODARONE HYDROCHLORIDE 400 MG: 200 TABLET ORAL at 08:01

## 2020-01-18 RX ADMIN — METHIMAZOLE 20 MG: 10 TABLET ORAL at 08:01

## 2020-01-18 NOTE — NURSING
Last BM 1/16/20.  Patient requests Lactulose. CROW Wilson. notified, orders to follow for lactulose.

## 2020-01-18 NOTE — NURSING
"Patient requests script at discharge for Zolpidem tab 5 mg nightly PRN insomnia.  Patient stated, "I slept a lot better,"  JANAK Wilson notified.  "

## 2020-01-18 NOTE — PROGRESS NOTES
[facility]  Heart Transplant  Progress Note    Patient Name: Tim Richards  MRN: [id number]  Admission Date: 1/11/2020  Hospital Length of Stay: 7 days  Attending Physician: Bettye Connors MD  Primary Care Provider: Primary Doctor No  Principal Problem:<principal problem not specified>    Subjective:     Interval History: No issues overnight or episodes of v-tach. Doing well    Continuous Infusions:   heparin (porcine) in D5W 15 Units/kg/hr (01/17/20 2053)     Scheduled Meds:   allopurinol  100 mg Oral Daily    amiodarone  400 mg Oral BID    amLODIPine  10 mg Oral Daily    doxazosin  8 mg Oral Q12H    ferrous gluconate  324 mg Oral Daily with breakfast    furosemide  80 mg Intravenous BID    magnesium oxide  400 mg Oral TID    methIMAzole  10 mg Oral QHS    methIMAzole  20 mg Oral Daily    pantoprazole  40 mg Oral Daily    polyethylene glycol  17 g Oral Daily    predniSONE  60 mg Oral Daily    senna-docusate 8.6-50 mg  1 tablet Oral BID    warfarin  5 mg Oral Daily     PRN Meds:acetaminophen, melatonin, ondansetron, oxyCODONE, zolpidem    Review of patient's allergies indicates:   Allergen Reactions    Lisinopril Anaphylaxis     Objective:     Vital Signs (Most Recent):  Temp: 97.9 °F (36.6 °C) (01/18/20 0754)  Pulse: 78 (01/18/20 0827)  Resp: 18 (01/18/20 0754)  BP: (!) 84/0 (01/18/20 0755)  SpO2: 97 % (01/18/20 0754) Vital Signs (24h Range):  Temp:  [97.9 °F (36.6 °C)-98.3 °F (36.8 °C)] 97.9 °F (36.6 °C)  Pulse:  [61-86] 78  Resp:  [16-18] 18  SpO2:  [93 %-99 %] 97 %  BP: ()/(0-75) 84/0     Patient Vitals for the past 72 hrs (Last 3 readings):   Weight   01/18/20 0710 116.7 kg (257 lb 4.4 oz)   01/17/20 0500 116.4 kg (256 lb 9.9 oz)   01/16/20 1100 115.8 kg (255 lb 2.9 oz)     Body mass index is 33.94 kg/m².      Intake/Output Summary (Last 24 hours) at 1/18/2020 1047  Last data filed at 1/18/2020 0500  Gross per 24 hour   Intake 1273 ml   Output 1475 ml   Net -202 ml        Hemodynamic Parameters:       Telemetry: sinus    Physical Exam   Constitutional: He is oriented to person, place, and time. He appears well-developed.   Neck: No JVD (6-7 cm H2O at 30 degrees) present.   Cardiovascular: Normal rate.   Murmur (VAD) heard.  Pulmonary/Chest: Effort normal and breath sounds normal.   Abdominal: Soft. Bowel sounds are normal.   Musculoskeletal: Normal range of motion. He exhibits no edema.   Neurological: He is alert and oriented to person, place, and time. No cranial nerve deficit.   Skin: Skin is warm and dry. Capillary refill takes less than 2 seconds.     Significant Labs:  CBC:  Recent Labs   Lab 01/16/20  0500 01/17/20  0534 01/18/20  0506   WBC 8.65 10.45  10.64 12.84*   RBC 5.52 5.67  5.68 5.59   HGB 14.5 14.6  14.5 14.4   HCT 45.9 48.0  47.4 48.1    229  215 217   MCV 83 85  84 86   MCH 26.3* 25.7*  25.5* 25.8*   MCHC 31.6* 30.4*  30.6* 29.9*     BNP:  Recent Labs   Lab 01/13/20  0318 01/15/20  0345 01/17/20  0534   BNP 1,032* 551* 683*     CMP:  Recent Labs   Lab 01/13/20  0318  01/15/20  0345  01/17/20  0534 01/17/20  1811 01/18/20  0506      < > 111*   < > 80 204* 79   CALCIUM 9.1   < > 9.6   < > 9.6 9.9 9.9   ALBUMIN 3.4*  --  3.5  --  3.3*  --   --    PROT 6.9  --  7.4  --  6.8  --   --       < > 137   < > 136 135* 140   K 3.8   < > 3.7   < > 3.2* 5.2* 4.0   CO2 28   < > 30*   < > 29 23 28      < > 95   < > 96 101 100   BUN 22*   < > 23*   < > 24* 27* 27*   CREATININE 1.5*   < > 1.6*   < > 1.4 1.8* 1.6*   ALKPHOS 133  --  135  --  123  --   --    ALT 47*  --  38  --  29  --   --    AST 34  --  30  --  20  --   --    BILITOT 0.4  --  0.4  --  0.4  --   --     < > = values in this interval not displayed.      Coagulation:   Recent Labs   Lab 01/16/20  1140  01/17/20  0534 01/17/20  1046 01/18/20  0506   INR 1.5*  --  1.6*  --  1.7*   APTT 29.8   < > 46.0*  42.8* 41.0* 45.1*  44.9*    < > = values in this interval not displayed.      LDH:  Recent Labs   Lab 01/16/20  0500 01/17/20  0534 01/18/20  0506   * 294* 306*     Microbiology:  Microbiology Results (last 7 days)     ** No results found for the last 168 hours. **          I have reviewed all pertinent labs within the past 24 hours.    Estimated Creatinine Clearance: 71.4 mL/min (A) (based on SCr of 1.6 mg/dL (H)).    Diagnostic Results:  I have reviewed and interpreted all pertinent imaging results/findings within the past 24 hours.    Assessment and Plan:     Tim Richards is a 54 y.o. M with DCM,(EF 10%, grade III DD) s/p HM II with Outflow graft changed (03/9/18) as DT, BiV-Icd Medtr[employer] (2014), HtN, obesity transferred from  for VT now in VF, shocked by device x 7.  Has been taking prednisone for hyperthyroidism which has caused him to eat/retain fluid and gain 13lbs as of late.  On exam he is decompensated, k 3.4,  higher than prior, creat 1.9 around baseline, INR 5.3.     He was admitted to the hospital in 10/2019 with similar situation 2/2 ICD shocks. MMVT -- multiple rounds of ATP--degenerated the MMVT into VF and the he was unsuccessfully shocked multiple times . He was eventually shocked externally to convert into sinus rhythm .  It was postulated reasons for ineffective therapy (had worsening hypervolemia week prior, length of time in the arrhythmia prior to ICD shock, IV amiodarone causing higher defibrillatory tissue threshold, or change in heart-shock vector post LVAD implant). He had a NIPS which showed successful DFT at 35J. He was discharge after being diuresed. Device check noted on 12/14/19 - one episode of MMVT 270 ms that was appropriately successfully shocked with multiple episodes of non sustained slower VT. Of note he had a monitoring zone added during his admission at 133. VT 1 zone at 167 -- 6 rounds of ATP added before shock in that zone.      TTE demonstrated EF >10%, AV does not open, IVF dilated 2.5cm with respiratory variation >50%,  Dilated RV / LV.    Hyperthyroidism  - Likely due to amiodarone per endocrinology.  - US thyroid no nodules   - Continue prednisone 60 mg  - Methimazole decreased to 20/10  - TFT better this AM but still elevated  - Discussed with endo, plan is to stop methimazole once fT4 normalizes and then start weaning prednisone. Can be done as outpatient. Will plan on DC on Monday    Presence of left ventricular assist device (LVAD)  Anticoagulation - on warfarin and heparin bridge  Procedure: Device Interrogation Including analysis of device parameters  Current Settings: Ventricular Assist Device  Review of device function is stable  INR goal: 2-3; heparin likely to stop tomorrow as suspect he will be therapeutic then    TXP LVAD INTERROGATIONS 1/18/2020 1/18/2020 1/17/2020 1/17/2020 1/17/2020 1/17/2020 1/17/2020   Type HeartMate II HeartMate II HeartMate II HeartMate II HeartMate II HeartMate II HeartMate II   Flow 4.9 5.1 5.2 5.7 5.6 5.0 4.8   Speed 79015 94932 32636 96110 99366 42914 73381   PI 4.2 3.6 3.9 4.4 4.4 3.7 4.1   Power (Jackson) 6.3 5.7 6.1 6.8 6.8 6.4 6.3   LSL 9600 9600 9600 9600 9600 9600 9600   Pulsatility Intermittent pulse - - Pulse Pulse Pulse Pulse       Ventricular tachycardia  - Underwent sedation +external shock in ER  - No further events.  - Likely exacerbated due to acute decompensated heart failure and hyperthyroidism; will treat underlying etiology  - Will switch amiodarone from IV to 400 mg BID PO  - ICD settings adjusted by EP   - Would like to hold off on procedures until TFT's normalize, will discharge and plan for procedure as outpatient    Hypertension  - Continue amlodipine and doxazosin     Acute on chronic systolic congestive heart failure  - Lasix back to 80 mg VI BID, continue lasix 80mg IV today; possible transition to PO tomorrow for possible DC on Monday  - Check BMP BID        Latasha Manning MD  Heart Transplant  [facility]

## 2020-01-18 NOTE — ASSESSMENT & PLAN NOTE
Anticoagulation - on warfarin and heparin bridge  Procedure: Device Interrogation Including analysis of device parameters  Current Settings: Ventricular Assist Device  Review of device function is stable  INR goal: 2-3; heparin likely to stop tomorrow as suspect he will be therapeutic then    TXP LVAD INTERROGATIONS 1/18/2020 1/18/2020 1/17/2020 1/17/2020 1/17/2020 1/17/2020 1/17/2020   Type HeartMate II HeartMate II HeartMate II HeartMate II HeartMate II HeartMate II HeartMate II   Flow 4.9 5.1 5.2 5.7 5.6 5.0 4.8   Speed 46515 61934 90365 09854 59105 23079 14305   PI 4.2 3.6 3.9 4.4 4.4 3.7 4.1   Power (Jackson) 6.3 5.7 6.1 6.8 6.8 6.4 6.3   LSL 9600 9600 9600 9600 9600 9600 9600   Pulsatility Intermittent pulse - - Pulse Pulse Pulse Pulse

## 2020-01-18 NOTE — ASSESSMENT & PLAN NOTE
- Likely due to amiodarone per endocrinology.  - US thyroid no nodules   - Continue prednisone 60 mg  - Methimazole decreased to 20/10  - TFT better this AM but still elevated  - Discussed with endo, plan is to stop methimazole once fT4 normalizes and then start weaning prednisone. Can be done as outpatient. Will plan on DC on Monday

## 2020-01-18 NOTE — PROGRESS NOTES
01/18/2020  Casper Harris    Current provider:  Bettye Connors MD      I, Casper Harris, rounded on Tim Richards to ensure all mechanical assist device settings (IABP or VAD) were appropriate and all parameters were within limits.  I was able to ensure all back up equipment was present, the staff had no issues, and the Perfusion Department daily rounding was complete.    3:01 PM

## 2020-01-18 NOTE — NURSING
"Patient is reporting feeling, "unsteady" which may be attributed to "arguing with the wife (about bad cooking), over-working on the computer, and other things."  Patient stated he doesn't feel anything needs to be done.  VSS  "

## 2020-01-18 NOTE — SUBJECTIVE & OBJECTIVE
Interval History: No issues overnight or episodes of v-tach. Doing well    Continuous Infusions:   heparin (porcine) in D5W 15 Units/kg/hr (01/17/20 2053)     Scheduled Meds:   allopurinol  100 mg Oral Daily    amiodarone  400 mg Oral BID    amLODIPine  10 mg Oral Daily    doxazosin  8 mg Oral Q12H    ferrous gluconate  324 mg Oral Daily with breakfast    furosemide  80 mg Intravenous BID    magnesium oxide  400 mg Oral TID    methIMAzole  10 mg Oral QHS    methIMAzole  20 mg Oral Daily    pantoprazole  40 mg Oral Daily    polyethylene glycol  17 g Oral Daily    predniSONE  60 mg Oral Daily    senna-docusate 8.6-50 mg  1 tablet Oral BID    warfarin  5 mg Oral Daily     PRN Meds:acetaminophen, melatonin, ondansetron, oxyCODONE, zolpidem    Review of patient's allergies indicates:   Allergen Reactions    Lisinopril Anaphylaxis     Objective:     Vital Signs (Most Recent):  Temp: 97.9 °F (36.6 °C) (01/18/20 0754)  Pulse: 78 (01/18/20 0827)  Resp: 18 (01/18/20 0754)  BP: (!) 84/0 (01/18/20 0755)  SpO2: 97 % (01/18/20 0754) Vital Signs (24h Range):  Temp:  [97.9 °F (36.6 °C)-98.3 °F (36.8 °C)] 97.9 °F (36.6 °C)  Pulse:  [61-86] 78  Resp:  [16-18] 18  SpO2:  [93 %-99 %] 97 %  BP: ()/(0-75) 84/0     Patient Vitals for the past 72 hrs (Last 3 readings):   Weight   01/18/20 0710 116.7 kg (257 lb 4.4 oz)   01/17/20 0500 116.4 kg (256 lb 9.9 oz)   01/16/20 1100 115.8 kg (255 lb 2.9 oz)     Body mass index is 33.94 kg/m².      Intake/Output Summary (Last 24 hours) at 1/18/2020 1047  Last data filed at 1/18/2020 0500  Gross per 24 hour   Intake 1273 ml   Output 1475 ml   Net -202 ml       Hemodynamic Parameters:       Telemetry: sinus    Physical Exam   Constitutional: He is oriented to person, place, and time. He appears well-developed.   Neck: No JVD (6-7 cm H2O at 30 degrees) present.   Cardiovascular: Normal rate.   Murmur (VAD) heard.  Pulmonary/Chest: Effort normal and breath sounds normal.    Abdominal: Soft. Bowel sounds are normal.   Musculoskeletal: Normal range of motion. He exhibits no edema.   Neurological: He is alert and oriented to person, place, and time. No cranial nerve deficit.   Skin: Skin is warm and dry. Capillary refill takes less than 2 seconds.     Significant Labs:  CBC:  Recent Labs   Lab 01/16/20  0500 01/17/20  0534 01/18/20  0506   WBC 8.65 10.45  10.64 12.84*   RBC 5.52 5.67  5.68 5.59   HGB 14.5 14.6  14.5 14.4   HCT 45.9 48.0  47.4 48.1    229  215 217   MCV 83 85  84 86   MCH 26.3* 25.7*  25.5* 25.8*   MCHC 31.6* 30.4*  30.6* 29.9*     BNP:  Recent Labs   Lab 01/13/20  0318 01/15/20  0345 01/17/20  0534   BNP 1,032* 551* 683*     CMP:  Recent Labs   Lab 01/13/20  0318  01/15/20  0345  01/17/20  0534 01/17/20  1811 01/18/20  0506      < > 111*   < > 80 204* 79   CALCIUM 9.1   < > 9.6   < > 9.6 9.9 9.9   ALBUMIN 3.4*  --  3.5  --  3.3*  --   --    PROT 6.9  --  7.4  --  6.8  --   --       < > 137   < > 136 135* 140   K 3.8   < > 3.7   < > 3.2* 5.2* 4.0   CO2 28   < > 30*   < > 29 23 28      < > 95   < > 96 101 100   BUN 22*   < > 23*   < > 24* 27* 27*   CREATININE 1.5*   < > 1.6*   < > 1.4 1.8* 1.6*   ALKPHOS 133  --  135  --  123  --   --    ALT 47*  --  38  --  29  --   --    AST 34  --  30  --  20  --   --    BILITOT 0.4  --  0.4  --  0.4  --   --     < > = values in this interval not displayed.      Coagulation:   Recent Labs   Lab 01/16/20  1140  01/17/20  0534 01/17/20  1046 01/18/20  0506   INR 1.5*  --  1.6*  --  1.7*   APTT 29.8   < > 46.0*  42.8* 41.0* 45.1*  44.9*    < > = values in this interval not displayed.     LDH:  Recent Labs   Lab 01/16/20  0500 01/17/20  0534 01/18/20  0506   * 294* 306*     Microbiology:  Microbiology Results (last 7 days)     ** No results found for the last 168 hours. **          I have reviewed all pertinent labs within the past 24 hours.    Estimated Creatinine Clearance: 71.4 mL/min (A)  (based on SCr of 1.6 mg/dL (H)).    Diagnostic Results:  I have reviewed and interpreted all pertinent imaging results/findings within the past 24 hours.

## 2020-01-18 NOTE — NURSING
Patient has not received his lasix 80 mg IVP BID.  Messaged Pharmacy at 0844 hours, 0955 hours, and 1335 hours.  Called Pharmacy @ [phone] at 1100 and 1300 hours.  Bedside was told it was verified again, tubed, and it will be hand-delivered.  Patient has asked for the medication x 2 and informed of the status of the medication.

## 2020-01-18 NOTE — ASSESSMENT & PLAN NOTE
- Lasix back to 80 mg VI BID, continue lasix 80mg IV today; possible transition to PO tomorrow for possible DC on Monday  - Check BMP BID

## 2020-01-19 LAB
ANION GAP SERPL CALC-SCNC: 11 MMOL/L (ref 8–16)
ANION GAP SERPL CALC-SCNC: 8 MMOL/L (ref 8–16)
APTT BLDCRRT: 50.5 SEC (ref 21–32)
APTT BLDCRRT: 53.2 SEC (ref 21–32)
BASOPHILS # BLD AUTO: 0.12 K/UL (ref 0–0.2)
BASOPHILS NFR BLD: 0.7 % (ref 0–1.9)
BUN SERPL-MCNC: 22 MG/DL (ref 6–20)
BUN SERPL-MCNC: 22 MG/DL (ref 6–20)
CALCIUM SERPL-MCNC: 9.5 MG/DL (ref 8.7–10.5)
CALCIUM SERPL-MCNC: 9.8 MG/DL (ref 8.7–10.5)
CHLORIDE SERPL-SCNC: 100 MMOL/L (ref 95–110)
CHLORIDE SERPL-SCNC: 100 MMOL/L (ref 95–110)
CO2 SERPL-SCNC: 28 MMOL/L (ref 23–29)
CO2 SERPL-SCNC: 29 MMOL/L (ref 23–29)
CREAT SERPL-MCNC: 1.7 MG/DL (ref 0.5–1.4)
CREAT SERPL-MCNC: 2 MG/DL (ref 0.5–1.4)
DIFFERENTIAL METHOD: ABNORMAL
EOSINOPHIL # BLD AUTO: 0 K/UL (ref 0–0.5)
EOSINOPHIL NFR BLD: 0.2 % (ref 0–8)
ERYTHROCYTE [DISTWIDTH] IN BLOOD BY AUTOMATED COUNT: 14.7 % (ref 11.5–14.5)
EST. GFR  (AFRICAN AMERICAN): 42.8 ML/MIN/1.73 M^2
EST. GFR  (AFRICAN AMERICAN): 52.1 ML/MIN/1.73 M^2
EST. GFR  (NON AFRICAN AMERICAN): 37 ML/MIN/1.73 M^2
EST. GFR  (NON AFRICAN AMERICAN): 45 ML/MIN/1.73 M^2
GLUCOSE SERPL-MCNC: 218 MG/DL (ref 70–110)
GLUCOSE SERPL-MCNC: 85 MG/DL (ref 70–110)
HCT VFR BLD AUTO: 48.2 % (ref 40–54)
HGB BLD-MCNC: 14.1 G/DL (ref 14–18)
IMM GRANULOCYTES # BLD AUTO: 0.73 K/UL (ref 0–0.04)
IMM GRANULOCYTES NFR BLD AUTO: 4.2 % (ref 0–0.5)
INR PPP: 2 (ref 0.8–1.2)
LDH SERPL L TO P-CCNC: 340 U/L (ref 110–260)
LYMPHOCYTES # BLD AUTO: 1.3 K/UL (ref 1–4.8)
LYMPHOCYTES NFR BLD: 7.8 % (ref 18–48)
MAGNESIUM SERPL-MCNC: 2.3 MG/DL (ref 1.6–2.6)
MCH RBC QN AUTO: 25.2 PG (ref 27–31)
MCHC RBC AUTO-ENTMCNC: 29.3 G/DL (ref 32–36)
MCV RBC AUTO: 86 FL (ref 82–98)
MONOCYTES # BLD AUTO: 1.7 K/UL (ref 0.3–1)
MONOCYTES NFR BLD: 10 % (ref 4–15)
NEUTROPHILS # BLD AUTO: 13.3 K/UL (ref 1.8–7.7)
NEUTROPHILS NFR BLD: 77.1 % (ref 38–73)
NRBC BLD-RTO: 0 /100 WBC
PHOSPHATE SERPL-MCNC: 4.1 MG/DL (ref 2.7–4.5)
PLATELET # BLD AUTO: 224 K/UL (ref 150–350)
PMV BLD AUTO: 11.2 FL (ref 9.2–12.9)
POTASSIUM SERPL-SCNC: 3.5 MMOL/L (ref 3.5–5.1)
POTASSIUM SERPL-SCNC: 5.2 MMOL/L (ref 3.5–5.1)
PROTHROMBIN TIME: 18.8 SEC (ref 9–12.5)
RBC # BLD AUTO: 5.59 M/UL (ref 4.6–6.2)
SODIUM SERPL-SCNC: 137 MMOL/L (ref 136–145)
SODIUM SERPL-SCNC: 139 MMOL/L (ref 136–145)
WBC # BLD AUTO: 17.25 K/UL (ref 3.9–12.7)

## 2020-01-19 PROCEDURE — 93750 INTERROGATION VAD IN PERSON: CPT | Mod: ,,, | Performed by: INTERNAL MEDICINE

## 2020-01-19 PROCEDURE — 25000003 PHARM REV CODE 250: Performed by: INTERNAL MEDICINE

## 2020-01-19 PROCEDURE — 63600175 PHARM REV CODE 636 W HCPCS: Performed by: INTERNAL MEDICINE

## 2020-01-19 PROCEDURE — 80048 BASIC METABOLIC PNL TOTAL CA: CPT

## 2020-01-19 PROCEDURE — 25000003 PHARM REV CODE 250: Performed by: STUDENT IN AN ORGANIZED HEALTH CARE EDUCATION/TRAINING PROGRAM

## 2020-01-19 PROCEDURE — 36415 COLL VENOUS BLD VENIPUNCTURE: CPT

## 2020-01-19 PROCEDURE — 85730 THROMBOPLASTIN TIME PARTIAL: CPT | Mod: 91

## 2020-01-19 PROCEDURE — 85025 COMPLETE CBC W/AUTO DIFF WBC: CPT

## 2020-01-19 PROCEDURE — 83735 ASSAY OF MAGNESIUM: CPT

## 2020-01-19 PROCEDURE — 84100 ASSAY OF PHOSPHORUS: CPT

## 2020-01-19 PROCEDURE — 27000248 HC VAD-ADDITIONAL DAY

## 2020-01-19 PROCEDURE — 85730 THROMBOPLASTIN TIME PARTIAL: CPT

## 2020-01-19 PROCEDURE — 93750 PR INTERROGATE VENT ASSIST DEV, IN PERSON, W PHYSICIAN ANALYSIS: ICD-10-PCS | Mod: ,,, | Performed by: INTERNAL MEDICINE

## 2020-01-19 PROCEDURE — 20600001 HC STEP DOWN PRIVATE ROOM

## 2020-01-19 PROCEDURE — 83615 LACTATE (LD) (LDH) ENZYME: CPT

## 2020-01-19 PROCEDURE — 85610 PROTHROMBIN TIME: CPT

## 2020-01-19 PROCEDURE — 99233 SBSQ HOSP IP/OBS HIGH 50: CPT | Mod: ,,, | Performed by: INTERNAL MEDICINE

## 2020-01-19 PROCEDURE — 99233 PR SUBSEQUENT HOSPITAL CARE,LEVL III: ICD-10-PCS | Mod: ,,, | Performed by: INTERNAL MEDICINE

## 2020-01-19 PROCEDURE — 63600175 PHARM REV CODE 636 W HCPCS: Performed by: STUDENT IN AN ORGANIZED HEALTH CARE EDUCATION/TRAINING PROGRAM

## 2020-01-19 PROCEDURE — 25000003 PHARM REV CODE 250: Performed by: GENERAL ACUTE CARE HOSPITAL

## 2020-01-19 RX ORDER — LORAZEPAM 0.5 MG/1
0.5 TABLET ORAL ONCE
Status: COMPLETED | OUTPATIENT
Start: 2020-01-19 | End: 2020-01-19

## 2020-01-19 RX ORDER — POTASSIUM CHLORIDE 20 MEQ/1
40 TABLET, EXTENDED RELEASE ORAL
Status: COMPLETED | OUTPATIENT
Start: 2020-01-19 | End: 2020-01-19

## 2020-01-19 RX ORDER — HYDRALAZINE HYDROCHLORIDE 20 MG/ML
5 INJECTION INTRAMUSCULAR; INTRAVENOUS ONCE
Status: COMPLETED | OUTPATIENT
Start: 2020-01-19 | End: 2020-01-19

## 2020-01-19 RX ORDER — TORSEMIDE 20 MG/1
40 TABLET ORAL DAILY
Status: DISCONTINUED | OUTPATIENT
Start: 2020-01-19 | End: 2020-01-20 | Stop reason: HOSPADM

## 2020-01-19 RX ADMIN — PANTOPRAZOLE SODIUM 40 MG: 40 TABLET, DELAYED RELEASE ORAL at 08:01

## 2020-01-19 RX ADMIN — DOXAZOSIN 8 MG: 4 TABLET ORAL at 08:01

## 2020-01-19 RX ADMIN — SENNOSIDES AND DOCUSATE SODIUM 1 TABLET: 8.6; 5 TABLET ORAL at 08:01

## 2020-01-19 RX ADMIN — ZOLPIDEM TARTRATE 5 MG: 5 TABLET ORAL at 08:01

## 2020-01-19 RX ADMIN — AMLODIPINE BESYLATE 10 MG: 10 TABLET ORAL at 12:01

## 2020-01-19 RX ADMIN — PREDNISONE 60 MG: 20 TABLET ORAL at 08:01

## 2020-01-19 RX ADMIN — Medication 400 MG: at 08:01

## 2020-01-19 RX ADMIN — LORAZEPAM 0.5 MG: 0.5 TABLET ORAL at 12:01

## 2020-01-19 RX ADMIN — POTASSIUM CHLORIDE 40 MEQ: 1500 TABLET, EXTENDED RELEASE ORAL at 12:01

## 2020-01-19 RX ADMIN — ALLOPURINOL 100 MG: 100 TABLET ORAL at 08:01

## 2020-01-19 RX ADMIN — FERROUS GLUCONATE TAB 324 MG (37.5 MG ELEMENTAL IRON) 324 MG: 324 (37.5 FE) TAB at 08:01

## 2020-01-19 RX ADMIN — AMIODARONE HYDROCHLORIDE 400 MG: 200 TABLET ORAL at 08:01

## 2020-01-19 RX ADMIN — FUROSEMIDE 80 MG: 10 INJECTION, SOLUTION INTRAVENOUS at 08:01

## 2020-01-19 RX ADMIN — DOXAZOSIN 8 MG: 4 TABLET ORAL at 05:01

## 2020-01-19 RX ADMIN — ACETAMINOPHEN 650 MG: 325 TABLET ORAL at 08:01

## 2020-01-19 RX ADMIN — TORSEMIDE 40 MG: 20 TABLET ORAL at 12:01

## 2020-01-19 RX ADMIN — POTASSIUM CHLORIDE 40 MEQ: 1500 TABLET, EXTENDED RELEASE ORAL at 01:01

## 2020-01-19 RX ADMIN — METHIMAZOLE 10 MG: 10 TABLET ORAL at 08:01

## 2020-01-19 RX ADMIN — WARFARIN SODIUM 5 MG: 5 TABLET ORAL at 04:01

## 2020-01-19 RX ADMIN — Medication 400 MG: at 04:01

## 2020-01-19 RX ADMIN — METHIMAZOLE 20 MG: 10 TABLET ORAL at 08:01

## 2020-01-19 NOTE — PROGRESS NOTES
[facility]  Heart Transplant  Progress Note    Patient Name: Tim Richards  MRN: [id number]  Admission Date: 1/11/2020  Hospital Length of Stay: 8 days  Attending Physician: Bettye Connors MD  Primary Care Provider: Primary Doctor No  Principal Problem:<principal problem not specified>    Subjective:     Interval History: No events overnight. Ready to go home    Continuous Infusions:  Scheduled Meds:   allopurinol  100 mg Oral Daily    amiodarone  400 mg Oral BID    amLODIPine  10 mg Oral Daily    doxazosin  8 mg Oral Q12H    ferrous gluconate  324 mg Oral Daily with breakfast    furosemide  80 mg Intravenous BID    magnesium oxide  400 mg Oral TID    methIMAzole  10 mg Oral QHS    methIMAzole  20 mg Oral Daily    pantoprazole  40 mg Oral Daily    polyethylene glycol  17 g Oral Daily    predniSONE  60 mg Oral Daily    senna-docusate 8.6-50 mg  1 tablet Oral BID    warfarin  5 mg Oral Daily     PRN Meds:acetaminophen, melatonin, ondansetron, oxyCODONE, zolpidem    Review of patient's allergies indicates:   Allergen Reactions    Lisinopril Anaphylaxis     Objective:     Vital Signs (Most Recent):  Temp: 97.7 °F (36.5 °C) (01/19/20 0740)  Pulse: 76 (01/19/20 0800)  Resp: 16 (01/19/20 0740)  BP: (!) 80/0 (01/19/20 0740)  SpO2: 97 % (01/19/20 0740) Vital Signs (24h Range):  Temp:  [97.5 °F (36.4 °C)-98.4 °F (36.9 °C)] 97.7 °F (36.5 °C)  Pulse:  [60-86] 76  Resp:  [16-18] 16  SpO2:  [92 %-99 %] 97 %  BP: ()/(0-76) 80/0     Patient Vitals for the past 72 hrs (Last 3 readings):   Weight   01/18/20 0710 116.7 kg (257 lb 4.4 oz)   01/17/20 0500 116.4 kg (256 lb 9.9 oz)     Body mass index is 33.94 kg/m².      Intake/Output Summary (Last 24 hours) at 1/19/2020 1111  Last data filed at 1/19/2020 0842  Gross per 24 hour   Intake 1140 ml   Output 4320 ml   Net -3180 ml       Telemetry: sinus     Physical Exam   Constitutional: He is oriented to person, place, and time. He  appears well-developed.   Neck: No JVD (6-7 cm H2O at 30 degrees) present.   Cardiovascular: Normal rate.   Murmur (VAD) heard.  Pulmonary/Chest: Effort normal and breath sounds normal.   Abdominal: Soft. Bowel sounds are normal.   Musculoskeletal: Normal range of motion. He exhibits no edema.   Neurological: He is alert and oriented to person, place, and time. No cranial nerve deficit.   Skin: Skin is warm and dry. Capillary refill takes less than 2 seconds.       Significant Labs:  CBC:  Recent Labs   Lab 01/17/20  0534 01/18/20  0506 01/19/20  0537   WBC 10.45  10.64 12.84* 17.25*   RBC 5.67  5.68 5.59 5.59   HGB 14.6  14.5 14.4 14.1   HCT 48.0  47.4 48.1 48.2     215 217 224   MCV 85  84 86 86   MCH 25.7*  25.5* 25.8* 25.2*   MCHC 30.4*  30.6* 29.9* 29.3*     BNP:  Recent Labs   Lab 01/13/20  0318 01/15/20  0345 01/17/20  0534   BNP 1,032* 551* 683*     CMP:  Recent Labs   Lab 01/13/20  0318  01/15/20  0345  01/17/20  0534  01/18/20  0506 01/18/20  1738 01/19/20  0536      < > 111*   < > 80   < > 79 158* 85   CALCIUM 9.1   < > 9.6   < > 9.6   < > 9.9 10.0 9.5   ALBUMIN 3.4*  --  3.5  --  3.3*  --   --   --   --    PROT 6.9  --  7.4  --  6.8  --   --   --   --       < > 137   < > 136   < > 140 137 139   K 3.8   < > 3.7   < > 3.2*   < > 4.0 4.7 3.5   CO2 28   < > 30*   < > 29   < > 28 26 28      < > 95   < > 96   < > 100 99 100   BUN 22*   < > 23*   < > 24*   < > 27* 26* 22*   CREATININE 1.5*   < > 1.6*   < > 1.4   < > 1.6* 1.7* 1.7*   ALKPHOS 133  --  135  --  123  --   --   --   --    ALT 47*  --  38  --  29  --   --   --   --    AST 34  --  30  --  20  --   --   --   --    BILITOT 0.4  --  0.4  --  0.4  --   --   --   --     < > = values in this interval not displayed.      Coagulation:   Recent Labs   Lab 01/17/20  0534 01/17/20  1046 01/18/20  0506 01/19/20  0537   INR 1.6*  --  1.7* 2.0*   APTT 46.0*  42.8* 41.0* 45.1*  44.9* 50.5*  53.2*     LDH:  Recent Labs   Lab  01/17/20  0534 01/18/20  0506 01/19/20  0537   * 306* 340*     Microbiology:  Microbiology Results (last 7 days)     ** No results found for the last 168 hours. **          I have reviewed all pertinent labs within the past 24 hours.    Estimated Creatinine Clearance: 67.2 mL/min (A) (based on SCr of 1.7 mg/dL (H)).    Diagnostic Results:  CXR: No results found in the last 24 hours.  I have reviewed and interpreted all pertinent imaging results/findings within the past 24 hours.    Assessment and Plan:     Tim Richards is a 54 y.o. M with DCM,(EF 10%, grade III DD) s/p HM II with Outflow graft changed (03/9/18) as DT, BiV-Icd [employer]tr[employer] (2014), HtN, obesity transferred from  for VT now in VF, shocked by device x 7.  Has been taking prednisone for hyperthyroidism which has caused him to eat/retain fluid and gain 13lbs as of late.  On exam he is decompensated, k 3.4,  higher than prior, creat 1.9 around baseline, INR 5.3.     He was admitted to the hospital in 10/2019 with similar situation 2/2 ICD shocks. MMVT -- multiple rounds of ATP--degenerated the MMVT into VF and the he was unsuccessfully shocked multiple times . He was eventually shocked externally to convert into sinus rhythm .  It was postulated reasons for ineffective therapy (had worsening hypervolemia week prior, length of time in the arrhythmia prior to ICD shock, IV amiodarone causing higher defibrillatory tissue threshold, or change in heart-shock vector post LVAD implant). He had a NIPS which showed successful DFT at 35J. He was discharge after being diuresed. Device check noted on 12/14/19 - one episode of MMVT 270 ms that was appropriately successfully shocked with multiple episodes of non sustained slower VT. Of note he had a monitoring zone added during his admission at 133. VT 1 zone at 167 -- 6 rounds of ATP added before shock in that zone.      TTE demonstrated EF >10%, AV does not open, IVF dilated 2.5cm with respiratory  variation >50%, Dilated RV / LV.    Hyperthyroidism  - Likely due to amiodarone per endocrinology.  - US thyroid no nodules   - Continue prednisone 60 mg  - Methimazole decreased to 20/10  - TFT better this AM but still elevated; recheck Monday prior to discharge  - Discussed with endo, plan is to stop methimazole once fT4 normalizes and then start weaning prednisone. Can be done as outpatient. Will plan on DC on Monday    Presence of left ventricular assist device (LVAD)  Anticoagulation - on warfarin and heparin bridge  Procedure: Device Interrogation Including analysis of device parameters  Current Settings: Ventricular Assist Device  Review of device function is stable  INR goal: 2-3; 2 today; heparin stopped    TXP LVAD INTERROGATIONS 1/19/2020 1/19/2020 1/18/2020 1/18/2020 1/18/2020 1/18/2020 1/18/2020   Type HeartMate II HeartMate II HeartMate II HeartMate II HeartMate II HeartMate II HeartMate II   Flow 5.8 5.1 5.5 5.6 5.8 5.2 4.9   Speed 66930 1000 91853 19971 58837 71136 66726   PI 3.6 4.1 3.6 3.5 3.4 3.0 4.2   Power (Jackson) 6.9 6.5 6.7 6.7 6.9 6.7 6.3   LSL 9600 9600 9600 9600 9600 9600 9600   Pulsatility Intermittent pulse Intermittent pulse Intermittent pulse Intermittent pulse Intermittent pulse Intermittent pulse Intermittent pulse       Ventricular tachycardia  - Underwent sedation +external shock in ER  - No further events.  - Likely exacerbated due to acute decompensated heart failure and hyperthyroidism; will treat underlying etiology  - Will switch amiodarone from IV to 400 mg BID PO  - ICD settings adjusted by EP   - Would like to hold off on procedures until TFT's normalize, will discharge and plan for procedure as outpatient    Hypertension  - Continue amlodipine and doxazosin     Acute on chronic systolic congestive heart failure  - Stop lasix IV and resume Torsemide at 40mg daily for now  - resume aldactone Monday  - Check BMP BID        Latasha Manning MD  Heart Transplant  [facility]

## 2020-01-19 NOTE — PROGRESS NOTES
01/19/20 1619 01/19/20 1622   Vital Signs   Temp 97.8 °F (36.6 °C)  --    Temp src Oral  --    Pulse 72  --    Heart Rate Source Monitor  --    Resp 16  --    SpO2 97 %  --    Pulse Oximetry Type Intermittent  --    O2 Device (Oxygen Therapy) room air  --    /82 (!) 90/0   MAP (mmHg) 95  --    BP Location Left arm Left arm   BP Method Automatic Doppler   Patient Position Lying Lying   BRYSON Hopkins M.D. notified @ [phone], and via secure chat. Ordered to give hydralazine 5 mg IVP once, wait x 1 hour and administer doxazosin tab 8 mg oral q12, and do not give 2100 hour dose.

## 2020-01-19 NOTE — ASSESSMENT & PLAN NOTE
Anticoagulation - on warfarin and heparin bridge  Procedure: Device Interrogation Including analysis of device parameters  Current Settings: Ventricular Assist Device  Review of device function is stable  INR goal: 2-3; 2 today; heparin stopped    TXP LVAD INTERROGATIONS 1/19/2020 1/19/2020 1/18/2020 1/18/2020 1/18/2020 1/18/2020 1/18/2020   Type HeartMate II HeartMate II HeartMate II HeartMate II HeartMate II HeartMate II HeartMate II   Flow 5.8 5.1 5.5 5.6 5.8 5.2 4.9   Speed 94807 1000 34894 08866 57185 37913 41258   PI 3.6 4.1 3.6 3.5 3.4 3.0 4.2   Power (Jackson) 6.9 6.5 6.7 6.7 6.9 6.7 6.3   LSL 9600 9600 9600 9600 9600 9600 9600   Pulsatility Intermittent pulse Intermittent pulse Intermittent pulse Intermittent pulse Intermittent pulse Intermittent pulse Intermittent pulse

## 2020-01-19 NOTE — SUBJECTIVE & OBJECTIVE
Interval History: No events overnight. Ready to go home    Continuous Infusions:  Scheduled Meds:   allopurinol  100 mg Oral Daily    amiodarone  400 mg Oral BID    amLODIPine  10 mg Oral Daily    doxazosin  8 mg Oral Q12H    ferrous gluconate  324 mg Oral Daily with breakfast    furosemide  80 mg Intravenous BID    magnesium oxide  400 mg Oral TID    methIMAzole  10 mg Oral QHS    methIMAzole  20 mg Oral Daily    pantoprazole  40 mg Oral Daily    polyethylene glycol  17 g Oral Daily    predniSONE  60 mg Oral Daily    senna-docusate 8.6-50 mg  1 tablet Oral BID    warfarin  5 mg Oral Daily     PRN Meds:acetaminophen, melatonin, ondansetron, oxyCODONE, zolpidem    Review of patient's allergies indicates:   Allergen Reactions    Lisinopril Anaphylaxis     Objective:     Vital Signs (Most Recent):  Temp: 97.7 °F (36.5 °C) (01/19/20 0740)  Pulse: 76 (01/19/20 0800)  Resp: 16 (01/19/20 0740)  BP: (!) 80/0 (01/19/20 0740)  SpO2: 97 % (01/19/20 0740) Vital Signs (24h Range):  Temp:  [97.5 °F (36.4 °C)-98.4 °F (36.9 °C)] 97.7 °F (36.5 °C)  Pulse:  [60-86] 76  Resp:  [16-18] 16  SpO2:  [92 %-99 %] 97 %  BP: ()/(0-76) 80/0     Patient Vitals for the past 72 hrs (Last 3 readings):   Weight   01/18/20 0710 116.7 kg (257 lb 4.4 oz)   01/17/20 0500 116.4 kg (256 lb 9.9 oz)     Body mass index is 33.94 kg/m².      Intake/Output Summary (Last 24 hours) at 1/19/2020 1111  Last data filed at 1/19/2020 0842  Gross per 24 hour   Intake 1140 ml   Output 4320 ml   Net -3180 ml       Telemetry: sinus     Physical Exam   Constitutional: He is oriented to person, place, and time. He appears well-developed.   Neck: No JVD (6-7 cm H2O at 30 degrees) present.   Cardiovascular: Normal rate.   Murmur (VAD) heard.  Pulmonary/Chest: Effort normal and breath sounds normal.   Abdominal: Soft. Bowel sounds are normal.   Musculoskeletal: Normal range of motion. He exhibits no edema.   Neurological: He is alert and oriented to  person, place, and time. No cranial nerve deficit.   Skin: Skin is warm and dry. Capillary refill takes less than 2 seconds.       Significant Labs:  CBC:  Recent Labs   Lab 01/17/20  0534 01/18/20  0506 01/19/20  0537   WBC 10.45  10.64 12.84* 17.25*   RBC 5.67  5.68 5.59 5.59   HGB 14.6  14.5 14.4 14.1   HCT 48.0  47.4 48.1 48.2     215 217 224   MCV 85  84 86 86   MCH 25.7*  25.5* 25.8* 25.2*   MCHC 30.4*  30.6* 29.9* 29.3*     BNP:  Recent Labs   Lab 01/13/20  0318 01/15/20  0345 01/17/20  0534   BNP 1,032* 551* 683*     CMP:  Recent Labs   Lab 01/13/20  0318  01/15/20  0345  01/17/20  0534  01/18/20  0506 01/18/20  1738 01/19/20  0536      < > 111*   < > 80   < > 79 158* 85   CALCIUM 9.1   < > 9.6   < > 9.6   < > 9.9 10.0 9.5   ALBUMIN 3.4*  --  3.5  --  3.3*  --   --   --   --    PROT 6.9  --  7.4  --  6.8  --   --   --   --       < > 137   < > 136   < > 140 137 139   K 3.8   < > 3.7   < > 3.2*   < > 4.0 4.7 3.5   CO2 28   < > 30*   < > 29   < > 28 26 28      < > 95   < > 96   < > 100 99 100   BUN 22*   < > 23*   < > 24*   < > 27* 26* 22*   CREATININE 1.5*   < > 1.6*   < > 1.4   < > 1.6* 1.7* 1.7*   ALKPHOS 133  --  135  --  123  --   --   --   --    ALT 47*  --  38  --  29  --   --   --   --    AST 34  --  30  --  20  --   --   --   --    BILITOT 0.4  --  0.4  --  0.4  --   --   --   --     < > = values in this interval not displayed.      Coagulation:   Recent Labs   Lab 01/17/20  0534 01/17/20  1046 01/18/20  0506 01/19/20  0537   INR 1.6*  --  1.7* 2.0*   APTT 46.0*  42.8* 41.0* 45.1*  44.9* 50.5*  53.2*     LDH:  Recent Labs   Lab 01/17/20  0534 01/18/20  0506 01/19/20  0537   * 306* 340*     Microbiology:  Microbiology Results (last 7 days)     ** No results found for the last 168 hours. **          I have reviewed all pertinent labs within the past 24 hours.    Estimated Creatinine Clearance: 67.2 mL/min (A) (based on SCr of 1.7 mg/dL (H)).    Diagnostic  Results:  CXR: No results found in the last 24 hours.  I have reviewed and interpreted all pertinent imaging results/findings within the past 24 hours.

## 2020-01-19 NOTE — ASSESSMENT & PLAN NOTE
- Stop lasix IV and resume Torsemide at 40mg daily for now  - resume aldactone Monday  - Check BMP BID

## 2020-01-19 NOTE — ASSESSMENT & PLAN NOTE
- Likely due to amiodarone per endocrinology.  - US thyroid no nodules   - Continue prednisone 60 mg  - Methimazole decreased to 20/10  - TFT better this AM but still elevated; recheck Monday prior to discharge  - Discussed with endo, plan is to stop methimazole once fT4 normalizes and then start weaning prednisone. Can be done as outpatient. Will plan on DC on Monday

## 2020-01-19 NOTE — NURSING
Patient refused hydralazine.  Patient stated team discontinued medication because it causes dizziness, chronic constipation, and impotence.  BRYSON Hopkins M.D. Notified.  Ordered to give doxazosin now

## 2020-01-19 NOTE — NURSING
Results for JESUS PHELPS (MRN [id number]) as of 1/19/2020 10:40   Ref. Range 1/18/2020 05:06 1/18/2020 17:38 1/19/2020 05:36 1/19/2020 05:37 1/19/2020 05:37   Potassium Latest Ref Range: 3.5 - 5.1 mmol/L  4.7 3.5     KOREY Crum NP, called at [phone], will call back.

## 2020-01-20 ENCOUNTER — PATIENT MESSAGE (OUTPATIENT)
Dept: TRANSPLANT | Facility: CLINIC | Age: 55
End: 2020-01-20

## 2020-01-20 ENCOUNTER — PATIENT MESSAGE (OUTPATIENT)
Dept: ELECTROPHYSIOLOGY | Facility: CLINIC | Age: 55
End: 2020-01-20

## 2020-01-20 ENCOUNTER — PATIENT MESSAGE (OUTPATIENT)
Dept: ENDOCRINOLOGY | Facility: CLINIC | Age: 55
End: 2020-01-20

## 2020-01-20 VITALS
RESPIRATION RATE: 18 BRPM | HEIGHT: 73 IN | BODY MASS INDEX: 34.01 KG/M2 | TEMPERATURE: 97 F | HEART RATE: 68 BPM | OXYGEN SATURATION: 97 % | DIASTOLIC BLOOD PRESSURE: 75 MMHG | WEIGHT: 256.63 LBS | SYSTOLIC BLOOD PRESSURE: 95 MMHG

## 2020-01-20 DIAGNOSIS — E05.80 AMIODARONE-INDUCED HYPERTHYROIDISM: Primary | ICD-10-CM

## 2020-01-20 DIAGNOSIS — T46.2X5A AMIODARONE-INDUCED HYPERTHYROIDISM: Primary | ICD-10-CM

## 2020-01-20 PROBLEM — I47.20 VENTRICULAR TACHYCARDIA: Status: RESOLVED | Noted: 2020-01-11 | Resolved: 2020-01-20

## 2020-01-20 LAB
ALBUMIN SERPL BCP-MCNC: 3.4 G/DL (ref 3.5–5.2)
ALP SERPL-CCNC: 110 U/L (ref 55–135)
ALT SERPL W/O P-5'-P-CCNC: 43 U/L (ref 10–44)
ANION GAP SERPL CALC-SCNC: 10 MMOL/L (ref 8–16)
APTT BLDCRRT: 30.9 SEC (ref 21–32)
AST SERPL-CCNC: 32 U/L (ref 10–40)
BASOPHILS # BLD AUTO: 0.08 K/UL (ref 0–0.2)
BASOPHILS NFR BLD: 0.4 % (ref 0–1.9)
BILIRUB DIRECT SERPL-MCNC: 0.2 MG/DL (ref 0.1–0.3)
BILIRUB SERPL-MCNC: 0.3 MG/DL (ref 0.1–1)
BNP SERPL-MCNC: 495 PG/ML (ref 0–99)
BUN SERPL-MCNC: 21 MG/DL (ref 6–20)
CALCIUM SERPL-MCNC: 9.8 MG/DL (ref 8.7–10.5)
CHLORIDE SERPL-SCNC: 101 MMOL/L (ref 95–110)
CO2 SERPL-SCNC: 28 MMOL/L (ref 23–29)
CREAT SERPL-MCNC: 1.5 MG/DL (ref 0.5–1.4)
CRP SERPL-MCNC: 2.8 MG/L (ref 0–8.2)
DIFFERENTIAL METHOD: ABNORMAL
EOSINOPHIL # BLD AUTO: 0 K/UL (ref 0–0.5)
EOSINOPHIL NFR BLD: 0.1 % (ref 0–8)
ERYTHROCYTE [DISTWIDTH] IN BLOOD BY AUTOMATED COUNT: 14.7 % (ref 11.5–14.5)
EST. GFR  (AFRICAN AMERICAN): >60 ML/MIN/1.73 M^2
EST. GFR  (NON AFRICAN AMERICAN): 52.4 ML/MIN/1.73 M^2
GLUCOSE SERPL-MCNC: 79 MG/DL (ref 70–110)
HCT VFR BLD AUTO: 47.7 % (ref 40–54)
HGB BLD-MCNC: 14.1 G/DL (ref 14–18)
IMM GRANULOCYTES # BLD AUTO: 0.77 K/UL (ref 0–0.04)
IMM GRANULOCYTES NFR BLD AUTO: 4.2 % (ref 0–0.5)
INR PPP: 2.2 (ref 0.8–1.2)
LDH SERPL L TO P-CCNC: 336 U/L (ref 110–260)
LYMPHOCYTES # BLD AUTO: 1.2 K/UL (ref 1–4.8)
LYMPHOCYTES NFR BLD: 6.6 % (ref 18–48)
MAGNESIUM SERPL-MCNC: 2.2 MG/DL (ref 1.6–2.6)
MCH RBC QN AUTO: 25.6 PG (ref 27–31)
MCHC RBC AUTO-ENTMCNC: 29.6 G/DL (ref 32–36)
MCV RBC AUTO: 87 FL (ref 82–98)
MONOCYTES # BLD AUTO: 1.4 K/UL (ref 0.3–1)
MONOCYTES NFR BLD: 7.7 % (ref 4–15)
NEUTROPHILS # BLD AUTO: 15 K/UL (ref 1.8–7.7)
NEUTROPHILS NFR BLD: 81 % (ref 38–73)
NRBC BLD-RTO: 0 /100 WBC
PHOSPHATE SERPL-MCNC: 3.9 MG/DL (ref 2.7–4.5)
PLATELET # BLD AUTO: 192 K/UL (ref 150–350)
PMV BLD AUTO: 10.6 FL (ref 9.2–12.9)
POTASSIUM SERPL-SCNC: 4.5 MMOL/L (ref 3.5–5.1)
PREALB SERPL-MCNC: 30 MG/DL (ref 20–43)
PROT SERPL-MCNC: 6.7 G/DL (ref 6–8.4)
PROTHROMBIN TIME: 21.3 SEC (ref 9–12.5)
RBC # BLD AUTO: 5.51 M/UL (ref 4.6–6.2)
SODIUM SERPL-SCNC: 139 MMOL/L (ref 136–145)
T4 FREE SERPL-MCNC: 1.93 NG/DL (ref 0.71–1.51)
T4 SERPL-MCNC: 10.4 UG/DL (ref 4.5–11.5)
TSH SERPL DL<=0.005 MIU/L-ACNC: <0.01 UIU/ML (ref 0.4–4)
WBC # BLD AUTO: 18.5 K/UL (ref 3.9–12.7)

## 2020-01-20 PROCEDURE — 84443 ASSAY THYROID STIM HORMONE: CPT

## 2020-01-20 PROCEDURE — 99233 SBSQ HOSP IP/OBS HIGH 50: CPT | Mod: ,,, | Performed by: INTERNAL MEDICINE

## 2020-01-20 PROCEDURE — 85025 COMPLETE CBC W/AUTO DIFF WBC: CPT

## 2020-01-20 PROCEDURE — 85610 PROTHROMBIN TIME: CPT

## 2020-01-20 PROCEDURE — 85730 THROMBOPLASTIN TIME PARTIAL: CPT

## 2020-01-20 PROCEDURE — 36415 COLL VENOUS BLD VENIPUNCTURE: CPT

## 2020-01-20 PROCEDURE — 27000248 HC VAD-ADDITIONAL DAY

## 2020-01-20 PROCEDURE — 83735 ASSAY OF MAGNESIUM: CPT

## 2020-01-20 PROCEDURE — 86140 C-REACTIVE PROTEIN: CPT

## 2020-01-20 PROCEDURE — 93750 INTERROGATION VAD IN PERSON: CPT | Mod: ,,, | Performed by: INTERNAL MEDICINE

## 2020-01-20 PROCEDURE — 25000003 PHARM REV CODE 250: Performed by: GENERAL ACUTE CARE HOSPITAL

## 2020-01-20 PROCEDURE — 63600175 PHARM REV CODE 636 W HCPCS: Performed by: STUDENT IN AN ORGANIZED HEALTH CARE EDUCATION/TRAINING PROGRAM

## 2020-01-20 PROCEDURE — 93750 PR INTERROGATE VENT ASSIST DEV, IN PERSON, W PHYSICIAN ANALYSIS: ICD-10-PCS | Mod: ,,, | Performed by: INTERNAL MEDICINE

## 2020-01-20 PROCEDURE — 25000003 PHARM REV CODE 250: Performed by: STUDENT IN AN ORGANIZED HEALTH CARE EDUCATION/TRAINING PROGRAM

## 2020-01-20 PROCEDURE — 84100 ASSAY OF PHOSPHORUS: CPT

## 2020-01-20 PROCEDURE — 80076 HEPATIC FUNCTION PANEL: CPT

## 2020-01-20 PROCEDURE — 83880 ASSAY OF NATRIURETIC PEPTIDE: CPT

## 2020-01-20 PROCEDURE — 80048 BASIC METABOLIC PNL TOTAL CA: CPT

## 2020-01-20 PROCEDURE — 84134 ASSAY OF PREALBUMIN: CPT

## 2020-01-20 PROCEDURE — 84439 ASSAY OF FREE THYROXINE: CPT

## 2020-01-20 PROCEDURE — 83615 LACTATE (LD) (LDH) ENZYME: CPT

## 2020-01-20 PROCEDURE — 25000003 PHARM REV CODE 250: Performed by: INTERNAL MEDICINE

## 2020-01-20 PROCEDURE — 84436 ASSAY OF TOTAL THYROXINE: CPT

## 2020-01-20 PROCEDURE — 99233 PR SUBSEQUENT HOSPITAL CARE,LEVL III: ICD-10-PCS | Mod: ,,, | Performed by: INTERNAL MEDICINE

## 2020-01-20 RX ORDER — WARFARIN SODIUM 5 MG/1
TABLET ORAL
Qty: 135 TABLET | Refills: 3 | Status: ON HOLD | OUTPATIENT
Start: 2020-01-20 | End: 2020-03-13 | Stop reason: HOSPADM

## 2020-01-20 RX ORDER — AMIODARONE HYDROCHLORIDE 400 MG/1
400 TABLET ORAL 2 TIMES DAILY
Qty: 60 TABLET | Refills: 11 | Status: ON HOLD | OUTPATIENT
Start: 2020-01-20 | End: 2020-03-13 | Stop reason: SDUPTHER

## 2020-01-20 RX ORDER — POTASSIUM CHLORIDE 1500 MG/1
40 TABLET, EXTENDED RELEASE ORAL DAILY
Qty: 60 TABLET | Refills: 11 | Status: ON HOLD | OUTPATIENT
Start: 2020-01-20 | End: 2020-04-29 | Stop reason: HOSPADM

## 2020-01-20 RX ORDER — PANTOPRAZOLE SODIUM 40 MG/1
40 TABLET, DELAYED RELEASE ORAL DAILY
Qty: 90 TABLET | Refills: 3 | Status: SHIPPED | OUTPATIENT
Start: 2020-01-20 | End: 2021-01-12

## 2020-01-20 RX ORDER — TORSEMIDE 20 MG/1
40 TABLET ORAL DAILY
Qty: 60 TABLET | Refills: 11 | Status: SHIPPED | OUTPATIENT
Start: 2020-01-21 | End: 2020-03-30 | Stop reason: SDUPTHER

## 2020-01-20 RX ORDER — METHIMAZOLE 10 MG/1
TABLET ORAL
Qty: 90 TABLET | Refills: 11 | Status: SHIPPED | OUTPATIENT
Start: 2020-01-20 | End: 2020-01-23

## 2020-01-20 RX ORDER — TALC
6 POWDER (GRAM) TOPICAL NIGHTLY PRN
Refills: 0 | COMMUNITY
Start: 2020-01-20 | End: 2020-02-04

## 2020-01-20 RX ORDER — WARFARIN SODIUM 5 MG/1
5 TABLET ORAL DAILY
Qty: 30 TABLET | Refills: 11 | Status: ON HOLD | OUTPATIENT
Start: 2020-01-20 | End: 2020-03-13 | Stop reason: SDUPTHER

## 2020-01-20 RX ORDER — ZOLPIDEM TARTRATE 5 MG/1
5 TABLET ORAL NIGHTLY PRN
Qty: 30 TABLET | Refills: 0 | Status: SHIPPED | OUTPATIENT
Start: 2020-01-20 | End: 2020-02-07

## 2020-01-20 RX ADMIN — FERROUS GLUCONATE TAB 324 MG (37.5 MG ELEMENTAL IRON) 324 MG: 324 (37.5 FE) TAB at 09:01

## 2020-01-20 RX ADMIN — TORSEMIDE 40 MG: 20 TABLET ORAL at 09:01

## 2020-01-20 RX ADMIN — METHIMAZOLE 20 MG: 10 TABLET ORAL at 09:01

## 2020-01-20 RX ADMIN — AMIODARONE HYDROCHLORIDE 400 MG: 200 TABLET ORAL at 09:01

## 2020-01-20 RX ADMIN — Medication 400 MG: at 09:01

## 2020-01-20 RX ADMIN — DOXAZOSIN 8 MG: 4 TABLET ORAL at 09:01

## 2020-01-20 RX ADMIN — PANTOPRAZOLE SODIUM 40 MG: 40 TABLET, DELAYED RELEASE ORAL at 09:01

## 2020-01-20 RX ADMIN — PREDNISONE 60 MG: 20 TABLET ORAL at 09:01

## 2020-01-20 RX ADMIN — SENNOSIDES AND DOCUSATE SODIUM 1 TABLET: 8.6; 5 TABLET ORAL at 09:01

## 2020-01-20 NOTE — PROGRESS NOTES
01/20/2020  Fitz Christianson    Current provider:  Bettye Connors MD      I, Fitz Christianson, rounded on Tim Richards to ensure all mechanical assist device settings (IABP or VAD) were appropriate and all parameters were within limits.  I was able to ensure all back up equipment was present, the staff had no issues, and the Perfusion Department daily rounding was complete.    7:32 AM

## 2020-01-20 NOTE — PROGRESS NOTES
DISCHARGE NOTE:    Tim Richards is a 54 y.o. male s/p HM2 lvad from 3/9/18 transferred from OSH for evaluation of VT requiring external shock. His PMH is significant for hyperthyroidism, VT /VT, GI bleeding.     Past Medical History:   Diagnosis Date    CHF (congestive heart failure)     Dilated cardiomyopathy 1/10/2018    Disorder of kidney and ureter     CKD    Gout     HTN (hypertension)     Ventricular tachycardia (paroxysmal)        Hospital Course: During his hospital stay Mr. Richards was evaluated by endocrinology and EP. He reported running out of prednisone at home, with tremors, palpitations and diaphoresis. Per endocrinology he likely has Type2 Amio hyperthyroidism. They dose adjusted his methimazole, which is at 20mg orally in the AM and 10mg in the PM. Plan to continue prednisone 60mg orally daily.      Mr. Richards was reloaded with IV amiodarone and 400mg orally twice daily. Plan to complete two weeks, then decrease to 400mg orally daily on 1/30/20. EP noted he will likely require an ICD generator change with RV lead/dual coil when thyroid function is normalized.     He was bridged with UFH to therapeutic INR. He was instructed to take warfarin 5mg orally daily. Plan for follow up with coumadin clinic on Thursday.     Allergies:   Review of patient's allergies indicates:   Allergen Reactions    Lisinopril Anaphylaxis    Hydralazine analogues      Chronic constipation, impotence, dizziness       Patient Pharmacy: [facility]     Pharmacy Interventions/Recommendations:  1) INR Goal: 2-3    2) Antiplatelet Agents: None    3) Heparin Bridging:  UFH    4) Patient Counseling/Education:  Completed     5) INR Follow-Up/Discharge Needs:  Thursday     See list of discharge medication for dosing instructions.     Tim Richards and his caregiver verbalized their understanding and had the opportunity to ask questions.      Discharge Medications:   Tim Richards   Home Medication  Instructions [facility]:[id number]    Printed on:01/20/20 1003   Medication Information                      allopurinol (ZYLOPRIM) 100 MG tablet  TAKE 1 TABLET BY MOUTH EVERY DAY             amiodarone (PACERONE) 400 MG tablet  Take 1 tablet (400 mg total) by mouth 2 (two) times daily.             amLODIPine (NORVASC) 10 MG tablet  Take 1 tablet (10 mg total) by mouth once daily.             doxazosin (CARDURA) 8 MG Tab  Take 1 tablet (8 mg total) by mouth every 12 (twelve) hours.             ferrous gluconate (FERGON) 324 MG tablet  Take 1 tablet (324 mg total) by mouth daily with breakfast.             magnesium oxide (MAG-OX) 400 mg (241.3 mg magnesium) tablet  TAKE 1 TABLET (400 MG TOTAL) BY MOUTH 3 (THREE) TIMES DAILY.             melatonin (MELATIN) 3 mg tablet  Take 2 tablets (6 mg total) by mouth nightly as needed for Insomnia.             methIMAzole (TAPAZOLE) 10 MG Tab  Take 2 tablets (20 mg total) by mouth once daily AND 1 tablet (10 mg total) every evening.             pantoprazole (PROTONIX) 40 MG tablet  TAKE 1 TABLET (40 MG TOTAL) BY MOUTH ONCE DAILY.             potassium chloride (K-TAB) 20 mEq  Take 2 tablets (40 mEq total) by mouth once daily.             predniSONE (DELTASONE) 20 MG tablet  Take 3 tablets (60 mg total) by mouth once daily.             sildenafil (VIAGRA) 100 MG tablet  Take 1 tablet (100 mg total) by mouth daily as needed for Erectile Dysfunction.             torsemide (DEMADEX) 20 MG Tab  Take 2 tablets (40 mg total) by mouth once daily.             warfarin (COUMADIN) 5 MG tablet  Take 1 tablet (5 mg total) by mouth Daily.             zolpidem (AMBIEN) 5 MG Tab  Take 1 tablet (5 mg total) by mouth nightly as needed.

## 2020-01-20 NOTE — PROGRESS NOTES
01/19/2020  Casper Harris    Current provider:  Bettye Connors MD      I, Casper Harris, rounded on Tim Richards to ensure all mechanical assist device settings (IABP or VAD) were appropriate and all parameters were within limits.  I was able to ensure all back up equipment was present, the staff had no issues, and the Perfusion Department daily rounding was complete.    9:35 PM

## 2020-01-20 NOTE — ASSESSMENT & PLAN NOTE
- Likely due to amiodarone per endocrinology.  - US thyroid no nodules   - Continue prednisone 60 mg  - Methimazole decreased to 20AM/10PM  - TFT continues to trend down  - Discussed with endo, plan is to stop methimazole once fT4 normalizes and then start weaning prednisone. Can be done as outpatient. D/c home today

## 2020-01-20 NOTE — PROGRESS NOTES
D/C-1/20/20-transferred from OSH for evaluation of VT requiring external shock. His PMH is significant for hyperthyroidism, VT /VT, GI bleeding.  Per endocrinology he likely has Type2 Amio hyperthyroidism. They dose adjusted his methimazole, which is at 20mg orally in the AM and 10mg in the PM. Plan to continue prednisone 60mg orally daily.   Mr. Richards was reloaded with IV amiodarone and 400mg orally twice daily. Plan to complete two weeks, then decrease to 400mg orally daily on 1/30/20. EP noted he will likely require an ICD generator change with RV lead/dual coil when thyroid function is normalized. He was bridged with UFH to therapeutic INR. He was instructed to take warfarin 5mg orally daily. Plan for follow up with coumadin clinic on Thursday. Coumadin calendar updated per MAR.  INR Thursday.

## 2020-01-20 NOTE — NURSING
Pt given discharge instruction, medication reviewed with the patient, follow up appointments reviewed. Pt had question about his put patient appointment, discussed with the team. Wife asked to be called [phone] about further plan on appointment.  All other questions and concerns addressed. Pt verbalized understanding. Left IV, telemetry removed. Discharge paperwork given to patient. Pt getting ready to leave the floor.

## 2020-01-20 NOTE — ASSESSMENT & PLAN NOTE
Anticoagulation - on warfarin and heparin bridge  Procedure: Device Interrogation Including analysis of device parameters  Current Settings: Ventricular Assist Device  Review of device function is stable  INR goal: 2-3; 2 today; heparin stopped    TXP LVAD INTERROGATIONS 1/20/2020 1/20/2020 1/20/2020 1/19/2020 1/19/2020 1/19/2020 1/19/2020   Type HeartMate II HeartMate II HeartMate II HeartMate II HeartMate II HeartMate II HeartMate II   Flow 5.9 5.6 5.1 6.1 5.6 5.5 5.8   Speed 53727 42952 52483 70782 36614 69284 62851   PI 4.2 3.9 4.2 3.3 4.1 4.0 3.6   Power (Jackson) 7 7 6.5 6.9 6.7 6.7 6.9   LSL 9600 9600 9600 9600 9600 9600 9600   Pulsatility Pulse Intermittent pulse Intermittent pulse Intermittent pulse Intermittent pulse Intermittent pulse Intermittent pulse

## 2020-01-20 NOTE — PROGRESS NOTES
[facility]  Heart Transplant  Progress Note    Patient Name: Tim Richards  MRN: [id number]  Admission Date: 1/11/2020  Hospital Length of Stay: 9 days  Attending Physician: No att. providers found  Primary Care Provider: Primary Doctor No  Principal Problem:Ventricular tachycardia    Subjective:     Interval History: No events overnight. Ready for discharge. Free T4 TSH are improving    Continuous Infusions:  Scheduled Meds:   allopurinol  100 mg Oral Daily    amiodarone  400 mg Oral BID    amLODIPine  10 mg Oral Daily    doxazosin  8 mg Oral Q12H    ferrous gluconate  324 mg Oral Daily with breakfast    magnesium oxide  400 mg Oral TID    methIMAzole  10 mg Oral QHS    methIMAzole  20 mg Oral Daily    pantoprazole  40 mg Oral Daily    polyethylene glycol  17 g Oral Daily    predniSONE  60 mg Oral Daily    senna-docusate 8.6-50 mg  1 tablet Oral BID    torsemide  40 mg Oral Daily    warfarin  5 mg Oral Daily     PRN Meds:acetaminophen, melatonin, ondansetron, oxyCODONE, zolpidem    Review of patient's allergies indicates:   Allergen Reactions    Lisinopril Anaphylaxis    Hydralazine analogues      Chronic constipation, impotence, dizziness     Objective:     Vital Signs (Most Recent):  Temp: 97.4 °F (36.3 °C) (01/20/20 1128)  Pulse: 68 (01/20/20 1203)  Resp: 18 (01/20/20 1128)  BP: 95/75 (01/20/20 1203)  SpO2: 97 % (01/20/20 1203) Vital Signs (24h Range):  Temp:  [97.4 °F (36.3 °C)-98.3 °F (36.8 °C)] 97.4 °F (36.3 °C)  Pulse:  [58-84] 68  Resp:  [16-18] 18  SpO2:  [95 %-99 %] 97 %  BP: ()/(0-82) 95/75     Patient Vitals for the past 72 hrs (Last 3 readings):   Weight   01/20/20 0700 116.4 kg (256 lb 9.9 oz)   01/18/20 0710 116.7 kg (257 lb 4.4 oz)     Body mass index is 33.86 kg/m².      Intake/Output Summary (Last 24 hours) at 1/20/2020 1356  Last data filed at 1/20/2020 1200  Gross per 24 hour   Intake 1920 ml   Output 2250 ml   Net -330 ml         Physical  Exam   Constitutional: He is oriented to person, place, and time. He appears well-developed.   Neck: No JVD present.   Cardiovascular: Normal rate.   Murmur (VAD) heard.  Pulmonary/Chest: Effort normal and breath sounds normal.   Abdominal: Soft. Bowel sounds are normal.   Musculoskeletal: Normal range of motion. He exhibits no edema.   Neurological: He is alert and oriented to person, place, and time. No cranial nerve deficit.   Skin: Skin is warm and dry. Capillary refill takes less than 2 seconds.        Significant Labs:  CBC:  Recent Labs   Lab 01/18/20  0506 01/19/20  0537 01/20/20  0524   WBC 12.84* 17.25* 18.50*   RBC 5.59 5.59 5.51   HGB 14.4 14.1 14.1   HCT 48.1 48.2 47.7    224 192   MCV 86 86 87   MCH 25.8* 25.2* 25.6*   MCHC 29.9* 29.3* 29.6*     BNP:  Recent Labs   Lab 01/15/20  0345 01/17/20  0534 01/20/20  0524   * 683* 495*     CMP:  Recent Labs   Lab 01/15/20  0345  01/17/20  0534  01/19/20  0536 01/19/20  1811 01/20/20  0523   *   < > 80   < > 85 218* 79   CALCIUM 9.6   < > 9.6   < > 9.5 9.8 9.8   ALBUMIN 3.5  --  3.3*  --   --   --  3.4*   PROT 7.4  --  6.8  --   --   --  6.7      < > 136   < > 139 137 139   K 3.7   < > 3.2*   < > 3.5 5.2* 4.5   CO2 30*   < > 29   < > 28 29 28   CL 95   < > 96   < > 100 100 101   BUN 23*   < > 24*   < > 22* 22* 21*   CREATININE 1.6*   < > 1.4   < > 1.7* 2.0* 1.5*   ALKPHOS 135  --  123  --   --   --  110   ALT 38  --  29  --   --   --  43   AST 30  --  20  --   --   --  32   BILITOT 0.4  --  0.4  --   --   --  0.3    < > = values in this interval not displayed.      Coagulation:   Recent Labs   Lab 01/18/20  0506 01/19/20  0537 01/20/20  0524   INR 1.7* 2.0* 2.2*   APTT 45.1*  44.9* 50.5*  53.2* 30.9     LDH:  Recent Labs   Lab 01/18/20  0506 01/19/20  0537 01/20/20  0523   * 340* 336*     Microbiology:  Microbiology Results (last 7 days)     ** No results found for the last 168 hours. **          I have reviewed all pertinent  labs within the past 24 hours.    Estimated Creatinine Clearance: 76.1 mL/min (A) (based on SCr of 1.5 mg/dL (H)).    Diagnostic Results:  I have reviewed and interpreted all pertinent imaging results/findings within the past 24 hours.    Assessment and Plan:     Tim Richards is a 54 y.o. M with DCM,(EF 10%, grade III DD) s/p HM II with Outflow graft changed (03/9/18) as DT, BiV-Icd [employer] (2014), HtN, obesity transferred from  for VT now in VF, shocked by device x 7.  Has been taking prednisone for hyperthyroidism which has caused him to eat/retain fluid and gain 13lbs as of late.  On exam he is decompensated, k 3.4,  higher than prior, creat 1.9 around baseline, INR 5.3.     He was admitted to the hospital in 10/2019 with similar situation 2/2 ICD shocks. MMVT -- multiple rounds of ATP--degenerated the MMVT into VF and the he was unsuccessfully shocked multiple times . He was eventually shocked externally to convert into sinus rhythm .  It was postulated reasons for ineffective therapy (had worsening hypervolemia week prior, length of time in the arrhythmia prior to ICD shock, IV amiodarone causing higher defibrillatory tissue threshold, or change in heart-shock vector post LVAD implant). He had a NIPS which showed successful DFT at 35J. He was discharge after being diuresed. Device check noted on 12/14/19 - one episode of MMVT 270 ms that was appropriately successfully shocked with multiple episodes of non sustained slower VT. Of note he had a monitoring zone added during his admission at 133. VT 1 zone at 167 -- 6 rounds of ATP added before shock in that zone.      TTE demonstrated EF >10%, AV does not open, IVF dilated 2.5cm with respiratory variation >50%, Dilated RV / LV.    Hyperthyroidism  - Likely due to amiodarone per endocrinology.  - US thyroid no nodules   - Continue prednisone 60 mg  - Methimazole decreased to 20AM/10PM  - TFT continues to trend down  - Discussed with endo, plan is  to stop methimazole once fT4 normalizes and then start weaning prednisone. Can be done as outpatient. D/c home today    Presence of left ventricular assist device (LVAD)  Anticoagulation - on warfarin and heparin bridge  Procedure: Device Interrogation Including analysis of device parameters  Current Settings: Ventricular Assist Device  Review of device function is stable  INR goal: 2-3; 2 today; heparin stopped    TXP LVAD INTERROGATIONS 1/20/2020 1/20/2020 1/20/2020 1/19/2020 1/19/2020 1/19/2020 1/19/2020   Type HeartMate II HeartMate II HeartMate II HeartMate II HeartMate II HeartMate II HeartMate II   Flow 5.9 5.6 5.1 6.1 5.6 5.5 5.8   Speed 48489 42224 60968 12793 84181 67685 67930   PI 4.2 3.9 4.2 3.3 4.1 4.0 3.6   Power (Jackson) 7 7 6.5 6.9 6.7 6.7 6.9   LSL 9600 9600 9600 9600 9600 9600 9600   Pulsatility Pulse Intermittent pulse Intermittent pulse Intermittent pulse Intermittent pulse Intermittent pulse Intermittent pulse       VT (ventricular tachycardia)  Discussed directly with endocrine and EP, at this time, patient's thyroid is very active, endocrine staff recommend holding off on generator until thyroid function was closer to normal.     - Recommended LifeVest at Discharge until patient gets generator change and RV lead revision with Dr. Yun.   - See EP note.    Hypertension  - Continue amlodipine and doxazosin         Latasha Manning MD  Heart Transplant  [facility]

## 2020-01-20 NOTE — SUBJECTIVE & OBJECTIVE
Interval History: No events overnight. Ready for discharge. Free T4 TSH are improving    Continuous Infusions:  Scheduled Meds:   allopurinol  100 mg Oral Daily    amiodarone  400 mg Oral BID    amLODIPine  10 mg Oral Daily    doxazosin  8 mg Oral Q12H    ferrous gluconate  324 mg Oral Daily with breakfast    magnesium oxide  400 mg Oral TID    methIMAzole  10 mg Oral QHS    methIMAzole  20 mg Oral Daily    pantoprazole  40 mg Oral Daily    polyethylene glycol  17 g Oral Daily    predniSONE  60 mg Oral Daily    senna-docusate 8.6-50 mg  1 tablet Oral BID    torsemide  40 mg Oral Daily    warfarin  5 mg Oral Daily     PRN Meds:acetaminophen, melatonin, ondansetron, oxyCODONE, zolpidem    Review of patient's allergies indicates:   Allergen Reactions    Lisinopril Anaphylaxis    Hydralazine analogues      Chronic constipation, impotence, dizziness     Objective:     Vital Signs (Most Recent):  Temp: 97.4 °F (36.3 °C) (01/20/20 1128)  Pulse: 68 (01/20/20 1203)  Resp: 18 (01/20/20 1128)  BP: 95/75 (01/20/20 1203)  SpO2: 97 % (01/20/20 1203) Vital Signs (24h Range):  Temp:  [97.4 °F (36.3 °C)-98.3 °F (36.8 °C)] 97.4 °F (36.3 °C)  Pulse:  [58-84] 68  Resp:  [16-18] 18  SpO2:  [95 %-99 %] 97 %  BP: ()/(0-82) 95/75     Patient Vitals for the past 72 hrs (Last 3 readings):   Weight   01/20/20 0700 116.4 kg (256 lb 9.9 oz)   01/18/20 0710 116.7 kg (257 lb 4.4 oz)     Body mass index is 33.86 kg/m².      Intake/Output Summary (Last 24 hours) at 1/20/2020 1356  Last data filed at 1/20/2020 1200  Gross per 24 hour   Intake 1920 ml   Output 2250 ml   Net -330 ml         Physical Exam   Constitutional: He is oriented to person, place, and time. He appears well-developed.   Neck: No JVD present.   Cardiovascular: Normal rate.   Murmur (VAD) heard.  Pulmonary/Chest: Effort normal and breath sounds normal.   Abdominal: Soft. Bowel sounds are normal.   Musculoskeletal: Normal range of motion. He exhibits no edema.    Neurological: He is alert and oriented to person, place, and time. No cranial nerve deficit.   Skin: Skin is warm and dry. Capillary refill takes less than 2 seconds.        Significant Labs:  CBC:  Recent Labs   Lab 01/18/20 0506 01/19/20  0537 01/20/20  0524   WBC 12.84* 17.25* 18.50*   RBC 5.59 5.59 5.51   HGB 14.4 14.1 14.1   HCT 48.1 48.2 47.7    224 192   MCV 86 86 87   MCH 25.8* 25.2* 25.6*   MCHC 29.9* 29.3* 29.6*     BNP:  Recent Labs   Lab 01/15/20  0345 01/17/20  0534 01/20/20  0524   * 683* 495*     CMP:  Recent Labs   Lab 01/15/20  0345  01/17/20  0534  01/19/20  0536 01/19/20  1811 01/20/20  0523   *   < > 80   < > 85 218* 79   CALCIUM 9.6   < > 9.6   < > 9.5 9.8 9.8   ALBUMIN 3.5  --  3.3*  --   --   --  3.4*   PROT 7.4  --  6.8  --   --   --  6.7      < > 136   < > 139 137 139   K 3.7   < > 3.2*   < > 3.5 5.2* 4.5   CO2 30*   < > 29   < > 28 29 28   CL 95   < > 96   < > 100 100 101   BUN 23*   < > 24*   < > 22* 22* 21*   CREATININE 1.6*   < > 1.4   < > 1.7* 2.0* 1.5*   ALKPHOS 135  --  123  --   --   --  110   ALT 38  --  29  --   --   --  43   AST 30  --  20  --   --   --  32   BILITOT 0.4  --  0.4  --   --   --  0.3    < > = values in this interval not displayed.      Coagulation:   Recent Labs   Lab 01/18/20 0506 01/19/20 0537 01/20/20  0524   INR 1.7* 2.0* 2.2*   APTT 45.1*  44.9* 50.5*  53.2* 30.9     LDH:  Recent Labs   Lab 01/18/20  0506 01/19/20  0537 01/20/20  0523   * 340* 336*     Microbiology:  Microbiology Results (last 7 days)     ** No results found for the last 168 hours. **          I have reviewed all pertinent labs within the past 24 hours.    Estimated Creatinine Clearance: 76.1 mL/min (A) (based on SCr of 1.5 mg/dL (H)).    Diagnostic Results:  I have reviewed and interpreted all pertinent imaging results/findings within the past 24 hours.

## 2020-01-20 NOTE — DISCHARGE SUMMARY
[facility]  Heart Transplant  Discharge Summary      Patient Name: Tim Richards  MRN: [id number]  Admission Date: 1/11/2020  Hospital Length of Stay: 9 days  Discharge Date and Time: 01/20/2020 10:28 AM  Attending Physician: Bettye Connors MD   Discharging Provider: Latasha Manning MD  Primary Care Provider: Primary Doctor No     HPI:Tim Richards is a 54 y.o. M with DCM,(EF 10%, grade III DD) s/p HM II with Outflow graft changed (03/9/18) as DT, BiV-Icd [employer] (2014), HtN, obesity transferred from  for VT now in VF, shocked by device x 7 times. He reports he was sitting on the couch when the first shock occurred without warning followed by subsequent shocks every few minutes. Pt reports feeling palpitations before the shock but denies any chest pain or shortness of breath preceding the event. He does report 6-pillow orthopnea but denies any PND. He has chronic SOBOE.      Recently, he has been taking prednisone for hyperthyroidism which has caused him to eat and drink more than usual. As a result, he was gained ~13lbs.      He was noted to be in VT at  [facility], started on amiodarone gtt and transferred here.  On arrival, he did have chest pain, SOB, dizziness and was diaphoretic. His rhythm was suggestive of VF/tDP for which he received conscious sedation and external cardioversion with 200J and given IV Mg. He had an elevated BNP (522), K 3.4, Mg 1.8, INR 5.3. He was sedated with propofol and externally cardioverted with 200J into NSR. He is being admitted for further monitoring. Of note, TTE demonstrated EF >10%, AV does not open, IVF dilated 2.5cm with respiratory variation >50%, Dilated RV / LV.     He has prior history of VT in 10/2019 at which time he had MMVT underwent several rounds of ATP and degenerated into VF for which he was externally cardioverted into sinus rhythm. It was postulated reasons for ineffective therapy (had worsening hypervolemia week prior, length of time in  the arrhythmia prior to ICD shock, IV amiodarone causing higher defibrillatory tissue threshold, or change in heart-shock vector post LVAD implant). He had a NIPS which showed successful DFT at 35J. He was discharge after being diuresed. Device check noted on 12/14/19 - one episode of MMVT 270 ms that was appropriately successfully shocked with multiple episodes of non sustained slower VT. Of note he had a monitoring zone added during his admission at 133. VT 1 zone at 167 -- 6 rounds of ATP added before shock in that zone.         * No surgery found *     Hospital Course: Found to be in Torsades de pointes. He underwent sedation and external defibrillation receiving 200J with immediate return to sinus rhythm. Was placed on amiodarone gtt at that time as well. Found to be in decompensated HF at that time as well and placed on lasix gtt. Of note, found to hyperthyroid and Endocrine was consulted which placed patient on high dose steroids and methimazole.  Electrophysiology was also consulted with recommendations to redo DFT test vs possible change in hardware. Will plan for generator change and RV lead revision once more euvolemic and thyroid function normalized. Patient tolerated diuresis well and crt improved. Transitioned from lasix gtt to IV and then back to torsemide 40mg qday. No acute events or further VT. Stable for discharge.     Will follow up with EP, Endocrine, and HTS.       Physical Exam   Constitutional: He is oriented to person, place, and time. He appears well-developed.   Neck: No JVD (6-7 cm H2O at 30 degrees) present.   Cardiovascular: Normal rate.   Murmur (VAD) heard.  Pulmonary/Chest: Effort normal and breath sounds normal.   Abdominal: Soft. Bowel sounds are normal.   Musculoskeletal: Normal range of motion. He exhibits no edema.   Neurological: He is alert and oriented to person, place, and time. No cranial nerve deficit.   Skin: Skin is warm and dry. Capillary refill takes less than 2  seconds.     Consults (From admission, onward)        Status Ordering Provider     Inpatient consult to Cardiology  Once     Provider:  (Not yet assigned)    Completed SILVANA OSORIO     Inpatient consult to Electrophysiology  Once     Provider:  (Not yet assigned)    Completed TAVIA VITALE     Inpatient consult to Endocrinology  Once     Provider:  (Not yet assigned)    Completed MAURISIO CAR          Significant Diagnostic Studies: Labs:   CMP   Recent Labs   Lab 01/19/20  0536 01/19/20  1811 01/20/20  0523    137 139   K 3.5 5.2* 4.5    100 101   CO2 28 29 28   GLU 85 218* 79   BUN 22* 22* 21*   CREATININE 1.7* 2.0* 1.5*   CALCIUM 9.5 9.8 9.8   PROT  --   --  6.7   ALBUMIN  --   --  3.4*   BILITOT  --   --  0.3   ALKPHOS  --   --  110   AST  --   --  32   ALT  --   --  43   ANIONGAP 11 8 10   ESTGFRAFRICA 52.1* 42.8* >60.0   EGFRNONAA 45.0* 37.0* 52.4*   , CBC   Recent Labs   Lab 01/19/20  0537 01/20/20  0524   WBC 17.25* 18.50*   HGB 14.1 14.1   HCT 48.2 47.7    192    and INR   Lab Results   Component Value Date    INR 2.2 (H) 01/20/2020    INR 2.0 (H) 01/19/2020    INR 1.7 (H) 01/18/2020       Pending Diagnostic Studies:     Procedure Component Value Units Date/Time    Basic metabolic panel [[id number]] Collected:  01/14/20 1625    Order Status:  Sent Lab Status:  In process Updated:  01/14/20 1625    Specimen:  Blood         Final Active Diagnoses:    Diagnosis Date Noted POA    PRINCIPAL PROBLEM:  Ventricular tachycardia [I47.2] 01/11/2020 Yes    Hyperthyroidism [E05.90] 01/13/2020 Yes    PMT (pacemaker-mediated tachycardia) [I49.9]  Yes    Presence of left ventricular assist device (LVAD) [Z95.811] 01/12/2020 Not Applicable    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 Yun 6 weeks.    Patient Instructions:      Diet Cardiac     Notify your health care provider if you experience any of the following:  temperature >100.4     Notify your health care provider if you experience any of the following:  persistent dizziness, light-headedness, or visual disturbances     Notify your health care provider if you experience any of the following:  difficulty breathing or increased cough     Activity as tolerated     Medications:  Reconciled Home Medications:      Medication List      START taking these medications    melatonin 3 mg tablet  Commonly known as:  MELATIN  Take 2 tablets (6 mg total) by mouth nightly as needed for Insomnia.     zolpidem 5 MG Tab  Commonly known as:  AMBIEN  Take 1 tablet (5 mg total) by mouth nightly as needed.        CHANGE how you take these medications    amiodarone 400 MG tablet  Commonly known as:  PACERONE  Take 1 tablet (400 mg total) by mouth 2 (two) times daily.  What changed:  when to take this     methIMAzole 10 MG Tab  Commonly known as:  TAPAZOLE  Take 2 tablets (20 mg total) by mouth once daily AND 1 tablet (10 mg total) every evening.  What changed:  See the new instructions.     potassium chloride 20 mEq  Commonly known as:  K-TAB  Take 2 tablets (40 mEq total) by mouth once daily.  What changed:    · how much to take  · how to take this  · when to take this  · additional instructions     torsemide 20 MG Tab  Commonly known as:  DEMADEX  Take 2 tablets (40 mg total) by mouth once daily.  Start taking on:  January 21, 2020  What changed:    · how much to take  · how to take this  · when to take this  · additional instructions     warfarin 5 MG tablet  Commonly known as:  COUMADIN  Take 1 tablet (5 mg total) by mouth  Daily.  What changed:    · how much to take  · how to take this  · when to take this  · additional instructions        CONTINUE taking these medications    allopurinol 100 MG tablet  Commonly known as:  ZYLOPRIM  TAKE 1 TABLET BY MOUTH EVERY DAY     amLODIPine 10 MG tablet  Commonly known as:  NORVASC  Take 1 tablet (10 mg total) by mouth once daily.     doxazosin 8 MG Tab  Commonly known as:  CARDURA  Take 1 tablet (8 mg total) by mouth every 12 (twelve) hours.     ferrous gluconate 324 MG tablet  Commonly known as:  FERGON  Take 1 tablet (324 mg total) by mouth daily with breakfast.     magnesium oxide 400 mg (241.3 mg magnesium) tablet  Commonly known as:  MAG-OX  TAKE 1 TABLET (400 MG TOTAL) BY MOUTH 3 (THREE) TIMES DAILY.     pantoprazole 40 MG tablet  Commonly known as:  PROTONIX  TAKE 1 TABLET (40 MG TOTAL) BY MOUTH ONCE DAILY.     predniSONE 20 MG tablet  Commonly known as:  DELTASONE  Take 3 tablets (60 mg total) by mouth once daily.     sildenafil 100 MG tablet  Commonly known as:  VIAGRA  Take 1 tablet (100 mg total) by mouth daily as needed for Erectile Dysfunction.        STOP taking these medications    mesalamine 250 mg Cpsr  Commonly known as:  PENTASA     spironolactone 25 MG tablet  Commonly known as:  ALDACTONE            Latasha Manning MD  Heart Transplant  [facility]

## 2020-01-20 NOTE — ASSESSMENT & PLAN NOTE
Discussed directly with endocrine and EP, at this time, patient's thyroid is very active, endocrine staff recommend holding off on generator until thyroid function was closer to normal.     - Recommended LifeVest at Discharge until patient gets generator change and RV lead revision with Dr. Yun.   - See EP note.

## 2020-01-21 ENCOUNTER — TELEPHONE (OUTPATIENT)
Dept: ELECTROPHYSIOLOGY | Facility: CLINIC | Age: 55
End: 2020-01-21

## 2020-01-21 RX ORDER — LANOLIN ALCOHOL/MO/W.PET/CERES
400 CREAM (GRAM) TOPICAL 3 TIMES DAILY
Qty: 270 TABLET | Refills: 2 | Status: ON HOLD | OUTPATIENT
Start: 2020-01-21 | End: 2020-03-13 | Stop reason: HOSPADM

## 2020-01-21 NOTE — PROGRESS NOTES
DISCHARGE    SW to pt's room for discharge today.  Pt presents as sitting up at edge of bed, aao x4, calm, cooperative, and asking and answering questions appropriately.  Pt verbalizes understanding and agreement with plan for d/c to home today.  Pt reports his wife will transport him home today and should be on her way to hospital now to pick him up.  Pt denies any d/c needs, and none identified by medical team.  Pt reports coping adequately at this time, and denies any needs or concerns to SW.  SW providing ongoing psychosocial and counseling support, education, resources, assistance, and discharge planning as indicated.  SW remains available.

## 2020-01-21 NOTE — TELEPHONE ENCOUNTER
I spoke with Mr. Richards. He is very frustrated because while he was recently in the hospital he was told conflicting information regarding the next steps in his care. I let him know that I would look into it and get back to him as soon as possible. Patient verbalized understanding and appreciates call.

## 2020-01-21 NOTE — TELEPHONE ENCOUNTER
Just spoke with him via phone.   He doesn't want to wear a Life Vest; refuses. He understands that his ICD cannot be relied upon for tachy therapies at present.  I've sent a message to the attending endocrinologist, Dr Huerta, about when we can get the patient in for a lead revision. Awaiting response.

## 2020-01-22 ENCOUNTER — TELEPHONE (OUTPATIENT)
Dept: ELECTROPHYSIOLOGY | Facility: CLINIC | Age: 55
End: 2020-01-22

## 2020-01-22 ENCOUNTER — PATIENT OUTREACH (OUTPATIENT)
Dept: ADMINISTRATIVE | Facility: CLINIC | Age: 55
End: 2020-01-22

## 2020-01-22 DIAGNOSIS — T46.2X5A AMIODARONE-INDUCED HYPERTHYROIDISM: Primary | ICD-10-CM

## 2020-01-22 DIAGNOSIS — E05.80 AMIODARONE-INDUCED HYPERTHYROIDISM: Primary | ICD-10-CM

## 2020-01-22 NOTE — PATIENT INSTRUCTIONS
Ventricular Arrhythmia  The heart has its own electrical system to regulate the heartbeat. An abnormal change in the rate or pattern of the heartbeat is called an arrhythmia. It can cause the heart to move blood less efficiently. Four chambers hold blood as it moves through the heart. The 2 lower chambers of the heart are called ventricles. Problems with the signals in the heart can make the ventricles beat faster than normal.    Ventricular tachycardia (VT)  Rapid electrical from the ventricles can result in a fast heart rhythm called ventricular tachycardia or VT.  · With VT, abnormal electrical pathways or circuits form in the ventricles. This can be caused by any disease that damages the heart muscle. It's most commonly seen as a result of a heart attack or coronary artery disease.  · Electrical signals enter the abnormal circuit and loop around. With each loop, the signal tells the ventricles to contract. This makes the heart beat very fast. The heart may be beating too fast to pump blood. This can cause you to pass out. It can also cause cardiac arrest, leading to sudden death.   · In some cases, VT develops into ventricular fibrillation. This is the most serious type of arrhythmia as it is fatal if not promptly treated.    Ventricular fibrillation (VF)  At times, electrical signals may be sent so fast and unevenly that the heart muscle quivers and doesnt beat at all. This is called fibrillation:  · VF can occur if the ventricles contain several abnormal circuits, or if the heart muscle is acutely injured such as from a heart attack, and becomes electrically unstable.  · Signals caught in the circuits make the ventricles beat very quickly and unevenly. This keeps the heart muscle from pumping properly.  · The heart can get to the point that it cant pump at all. This is called cardiac arrest. Death will occur if emergency treatment isnt given to return the heart rhythm to normal.  Date Last Reviewed:  4/20/2016 © 2000-2017 [employer]. [address] [place] [address], [place] [address]. All rights reserved. This information is not intended as a substitute for professional medical care. Always follow your healthcare professional's instructions.        Uncertain Causes of Fall  You have had a fall today. But the cause of your fall is not certain. Falls can occur due to slipping, tripping or losing your balance. A fall can also occur from a fainting spell or seizure.  While a fall can happen for a simple reason (tripping over something), falls in elderly people are often caused by a combination of things:  · Age-related decline in function with worsening balance, stability, vision, and muscle strength  · Chronic illness such as heart arrhythmias, heart valve disease, vascular disease, COPD, diabetes, strokes, arthritis  · Effects or side effects of medicines  · Dehydration.  · Environmental hazards such as uneven or slippery ground, unfamiliar place, obstacles, uneven surfaces, or slippery ground  · Situational factors (related to the activity being done, e.g., rushing to the bathroom)  Because the cause of your fall today is not certain, it is possible that a fainting spell or seizure was the cause. This means that it could happen again, without warning. If you fall again, without a cause, then you should return to this facility promptly to have further tests. Otherwise, follow up with your doctor as explained below.  It is normal to feel sore and tight in your muscles and back the next day, and not just the muscles you initially injured. Remember, all the parts of your body are connected, so while initially one area hurts, the next day another may hurt. Also, when you injure yourself, it causes inflammation, which then causes the muscles to tighten up and hurt more. After the initial worsening, it should gradually improve over the next few days. However, more severe pain should be reported.  Even without a  definite head injury, you can still get a concussion. Concussions and even bleeding can still occur, especially if you have had a recent injury or take blood thinner medicine. It is not unusual to have a mild headache and feel tired and even nauseous or dizzy.  Home care  · Rest today and resume your normal activities as soon as you are feeling back to normal. It is best to remain with someone who can check on you for the next 24 hours to watch for another episode of falling.  · If you were injured during the fall, follow the advice from your doctor regarding care of your injury.  ·  If you become light-headed or dizzy, lie down immediately or sit and lean forward with your head down.  · As a precaution, do not drive a car or operate dangerous equipment, do not take a bath alone (use a shower instead) and do not swim alone until you see your doctor. A condition causing fainting or seizures must be ruled out before resuming these activities.  · You may use acetaminophen or ibuprofen to control pain, unless another pain medicine was prescribed. If you have chronic liver or kidney disease or ever had a stomach ulcer or gastrointestinal bleeding, talk with your doctor before using these medicines.  · Keep your appointments for any further testing that may have been scheduled for you.  Follow-up care  Follow up with your healthcare provider, or as advised.  If X-rays or CT scan were done, you will be notified if there is a change in the reading, especially if it affects treatment.  Call 911  Call 911 if any of these occur:  · Trouble breathing  · Confused or difficulty arousing  · Fainting or loss of consciousness  · Rapid or very slow heart rate  · Seizure  · Difficulty with speech or vision, weakness of an arm or leg  · Difficulty walking or talking, loss of balance, numbness or weakness in one side of your body, facial droop  When to seek medical advice  Call your healthcare provider right away if any of these  occur:  · Another unexplained fall  · Dizziness  · Severe headache  · Nausea and vomiting  · Blood in vomit, stools (black or red color)  Date Last Reviewed: 11/5/2015  © 6314-3410 [employer]. [address] [place] [address], [place] [address]. All rights reserved. This information is not intended as a substitute for professional medical care. Always follow your healthcare professional's instructions.

## 2020-01-23 ENCOUNTER — PATIENT MESSAGE (OUTPATIENT)
Dept: ENDOCRINOLOGY | Facility: HOSPITAL | Age: 55
End: 2020-01-23

## 2020-01-23 ENCOUNTER — LAB VISIT (OUTPATIENT)
Dept: LAB | Facility: HOSPITAL | Age: 55
End: 2020-01-23
Attending: STUDENT IN AN ORGANIZED HEALTH CARE EDUCATION/TRAINING PROGRAM
Payer: COMMERCIAL

## 2020-01-23 DIAGNOSIS — E05.80 AMIODARONE-INDUCED HYPERTHYROIDISM: ICD-10-CM

## 2020-01-23 DIAGNOSIS — T46.2X5A AMIODARONE-INDUCED HYPERTHYROIDISM: Primary | ICD-10-CM

## 2020-01-23 DIAGNOSIS — T46.2X5A AMIODARONE-INDUCED HYPERTHYROIDISM: ICD-10-CM

## 2020-01-23 DIAGNOSIS — E05.80 AMIODARONE-INDUCED HYPERTHYROIDISM: Primary | ICD-10-CM

## 2020-01-23 LAB
T4 FREE SERPL-MCNC: 1.55 NG/DL (ref 0.71–1.51)
TSH SERPL DL<=0.005 MIU/L-ACNC: <0.01 UIU/ML (ref 0.4–4)

## 2020-01-23 PROCEDURE — 84439 ASSAY OF FREE THYROXINE: CPT

## 2020-01-23 PROCEDURE — 84443 ASSAY THYROID STIM HORMONE: CPT

## 2020-01-23 PROCEDURE — 36415 COLL VENOUS BLD VENIPUNCTURE: CPT

## 2020-01-23 RX ORDER — METHIMAZOLE 10 MG/1
TABLET ORAL
Qty: 90 TABLET | Refills: 1 | Status: SHIPPED | OUTPATIENT
Start: 2020-01-23 | End: 2020-03-02 | Stop reason: SDUPTHER

## 2020-01-29 ENCOUNTER — TELEPHONE (OUTPATIENT)
Dept: ENDOCRINOLOGY | Facility: CLINIC | Age: 55
End: 2020-01-29

## 2020-01-29 ENCOUNTER — CLINICAL SUPPORT (OUTPATIENT)
Dept: TRANSPLANT | Facility: CLINIC | Age: 55
End: 2020-01-29
Payer: COMMERCIAL

## 2020-01-29 ENCOUNTER — PATIENT MESSAGE (OUTPATIENT)
Dept: ENDOCRINOLOGY | Facility: CLINIC | Age: 55
End: 2020-01-29

## 2020-01-29 ENCOUNTER — LAB VISIT (OUTPATIENT)
Dept: LAB | Facility: HOSPITAL | Age: 55
End: 2020-01-29
Attending: INTERNAL MEDICINE
Payer: COMMERCIAL

## 2020-01-29 ENCOUNTER — PATIENT MESSAGE (OUTPATIENT)
Dept: TRANSPLANT | Facility: CLINIC | Age: 55
End: 2020-01-29

## 2020-01-29 ENCOUNTER — TELEPHONE (OUTPATIENT)
Dept: ELECTROPHYSIOLOGY | Facility: CLINIC | Age: 55
End: 2020-01-29

## 2020-01-29 ENCOUNTER — ANTI-COAG VISIT (OUTPATIENT)
Dept: CARDIOLOGY | Facility: CLINIC | Age: 55
End: 2020-01-29

## 2020-01-29 ENCOUNTER — OFFICE VISIT (OUTPATIENT)
Dept: TRANSPLANT | Facility: CLINIC | Age: 55
End: 2020-01-29
Payer: COMMERCIAL

## 2020-01-29 VITALS — HEIGHT: 74 IN | SYSTOLIC BLOOD PRESSURE: 82 MMHG | WEIGHT: 255 LBS | BODY MASS INDEX: 32.73 KG/M2

## 2020-01-29 DIAGNOSIS — I50.9 HEART FAILURE: ICD-10-CM

## 2020-01-29 DIAGNOSIS — E05.80 AMIODARONE-INDUCED HYPERTHYROIDISM: Primary | ICD-10-CM

## 2020-01-29 DIAGNOSIS — Z79.01 LONG TERM (CURRENT) USE OF ANTICOAGULANTS: ICD-10-CM

## 2020-01-29 DIAGNOSIS — I50.42 CHRONIC COMBINED SYSTOLIC AND DIASTOLIC HEART FAILURE: ICD-10-CM

## 2020-01-29 DIAGNOSIS — Z95.811 LVAD (LEFT VENTRICULAR ASSIST DEVICE) PRESENT: ICD-10-CM

## 2020-01-29 DIAGNOSIS — T46.2X5A AMIODARONE-INDUCED HYPERTHYROIDISM: Primary | ICD-10-CM

## 2020-01-29 DIAGNOSIS — Z95.811 PRESENCE OF LEFT VENTRICULAR ASSIST DEVICE (LVAD): Primary | ICD-10-CM

## 2020-01-29 DIAGNOSIS — I10 ESSENTIAL HYPERTENSION: ICD-10-CM

## 2020-01-29 DIAGNOSIS — E05.80 AMIODARONE-INDUCED HYPERTHYROIDISM: ICD-10-CM

## 2020-01-29 DIAGNOSIS — Z95.811 HEART REPLACED BY HEART ASSIST DEVICE: ICD-10-CM

## 2020-01-29 DIAGNOSIS — I42.0 DCM (DILATED CARDIOMYOPATHY): ICD-10-CM

## 2020-01-29 DIAGNOSIS — T46.2X5A AMIODARONE-INDUCED HYPERTHYROIDISM: ICD-10-CM

## 2020-01-29 LAB
ALBUMIN SERPL BCP-MCNC: 4.2 G/DL (ref 3.5–5.2)
ALP SERPL-CCNC: 100 U/L (ref 55–135)
ALT SERPL W/O P-5'-P-CCNC: 33 U/L (ref 10–44)
ANION GAP SERPL CALC-SCNC: 13 MMOL/L (ref 8–16)
AST SERPL-CCNC: 26 U/L (ref 10–40)
BASOPHILS # BLD AUTO: 0.01 K/UL (ref 0–0.2)
BASOPHILS NFR BLD: 0.1 % (ref 0–1.9)
BILIRUB DIRECT SERPL-MCNC: 0.3 MG/DL (ref 0.1–0.3)
BILIRUB SERPL-MCNC: 0.6 MG/DL (ref 0.1–1)
BNP SERPL-MCNC: 305 PG/ML (ref 0–99)
BUN SERPL-MCNC: 40 MG/DL (ref 6–20)
CALCIUM SERPL-MCNC: 9.9 MG/DL (ref 8.7–10.5)
CHLORIDE SERPL-SCNC: 98 MMOL/L (ref 95–110)
CO2 SERPL-SCNC: 25 MMOL/L (ref 23–29)
CREAT SERPL-MCNC: 2.2 MG/DL (ref 0.5–1.4)
CRP SERPL-MCNC: 1.5 MG/L (ref 0–8.2)
DIFFERENTIAL METHOD: ABNORMAL
EOSINOPHIL # BLD AUTO: 0 K/UL (ref 0–0.5)
EOSINOPHIL NFR BLD: 0 % (ref 0–8)
ERYTHROCYTE [DISTWIDTH] IN BLOOD BY AUTOMATED COUNT: 15.6 % (ref 11.5–14.5)
EST. GFR  (AFRICAN AMERICAN): 38.1 ML/MIN/1.73 M^2
EST. GFR  (NON AFRICAN AMERICAN): 33 ML/MIN/1.73 M^2
GLUCOSE SERPL-MCNC: 99 MG/DL (ref 70–110)
HCT VFR BLD AUTO: 51.1 % (ref 40–54)
HGB BLD-MCNC: 15.2 G/DL (ref 14–18)
IMM GRANULOCYTES # BLD AUTO: 0.1 K/UL (ref 0–0.04)
IMM GRANULOCYTES NFR BLD AUTO: 0.9 % (ref 0–0.5)
INR PPP: 3.3 (ref 0.8–1.2)
LDH SERPL L TO P-CCNC: 447 U/L (ref 110–260)
LYMPHOCYTES # BLD AUTO: 0.7 K/UL (ref 1–4.8)
LYMPHOCYTES NFR BLD: 5.9 % (ref 18–48)
MAGNESIUM SERPL-MCNC: 2.3 MG/DL (ref 1.6–2.6)
MCH RBC QN AUTO: 25.6 PG (ref 27–31)
MCHC RBC AUTO-ENTMCNC: 29.7 G/DL (ref 32–36)
MCV RBC AUTO: 86 FL (ref 82–98)
MONOCYTES # BLD AUTO: 0.4 K/UL (ref 0.3–1)
MONOCYTES NFR BLD: 3.8 % (ref 4–15)
NEUTROPHILS # BLD AUTO: 10.1 K/UL (ref 1.8–7.7)
NEUTROPHILS NFR BLD: 89.3 % (ref 38–73)
NRBC BLD-RTO: 0 /100 WBC
PHOSPHATE SERPL-MCNC: 3 MG/DL (ref 2.7–4.5)
PLATELET # BLD AUTO: 194 K/UL (ref 150–350)
PMV BLD AUTO: 10.9 FL (ref 9.2–12.9)
POTASSIUM SERPL-SCNC: 4.4 MMOL/L (ref 3.5–5.1)
PREALB SERPL-MCNC: 49 MG/DL (ref 20–43)
PROT SERPL-MCNC: 7.7 G/DL (ref 6–8.4)
PROTHROMBIN TIME: 30.8 SEC (ref 9–12.5)
RBC # BLD AUTO: 5.93 M/UL (ref 4.6–6.2)
SODIUM SERPL-SCNC: 136 MMOL/L (ref 136–145)
T4 FREE SERPL-MCNC: 1.18 NG/DL (ref 0.71–1.51)
WBC # BLD AUTO: 11.27 K/UL (ref 3.9–12.7)

## 2020-01-29 PROCEDURE — 84134 ASSAY OF PREALBUMIN: CPT

## 2020-01-29 PROCEDURE — 36415 COLL VENOUS BLD VENIPUNCTURE: CPT

## 2020-01-29 PROCEDURE — 99214 OFFICE O/P EST MOD 30 MIN: CPT | Mod: S$GLB,,, | Performed by: INTERNAL MEDICINE

## 2020-01-29 PROCEDURE — 83735 ASSAY OF MAGNESIUM: CPT

## 2020-01-29 PROCEDURE — 84439 ASSAY OF FREE THYROXINE: CPT

## 2020-01-29 PROCEDURE — 82248 BILIRUBIN DIRECT: CPT

## 2020-01-29 PROCEDURE — 93750 INTERROGATION VAD IN PERSON: CPT | Mod: S$GLB,,, | Performed by: INTERNAL MEDICINE

## 2020-01-29 PROCEDURE — 99999 PR PBB SHADOW E&M-EST. PATIENT-LVL III: CPT | Mod: PBBFAC,,, | Performed by: INTERNAL MEDICINE

## 2020-01-29 PROCEDURE — 83615 LACTATE (LD) (LDH) ENZYME: CPT

## 2020-01-29 PROCEDURE — 80053 COMPREHEN METABOLIC PANEL: CPT

## 2020-01-29 PROCEDURE — 84100 ASSAY OF PHOSPHORUS: CPT

## 2020-01-29 PROCEDURE — 85610 PROTHROMBIN TIME: CPT

## 2020-01-29 PROCEDURE — 93750 OP LVAD INTERROGATION: ICD-10-PCS | Mod: S$GLB,,, | Performed by: INTERNAL MEDICINE

## 2020-01-29 PROCEDURE — 83880 ASSAY OF NATRIURETIC PEPTIDE: CPT

## 2020-01-29 PROCEDURE — 86140 C-REACTIVE PROTEIN: CPT

## 2020-01-29 PROCEDURE — 99999 PR PBB SHADOW E&M-EST. PATIENT-LVL III: ICD-10-PCS | Mod: PBBFAC,,, | Performed by: INTERNAL MEDICINE

## 2020-01-29 PROCEDURE — 85025 COMPLETE CBC W/AUTO DIFF WBC: CPT

## 2020-01-29 PROCEDURE — 99214 PR OFFICE/OUTPT VISIT, EST, LEVL IV, 30-39 MIN: ICD-10-PCS | Mod: S$GLB,,, | Performed by: INTERNAL MEDICINE

## 2020-01-29 NOTE — LETTER
January 29, 2020        Nader Chiu  [address] [facility]  [address]  [place] [address]  Phone: [phone]  Fax: [phone]             [facility] [address] [place] [address]  Phone: [phone]   Patient: Tim Richards   MR Number: [id number]   YOB: 1965   Date of Visit: 1/29/2020       Dear Dr. Nader Chiu    Thank you for referring Tim Richards to me for evaluation. Attached you will find relevant portions of my assessment and plan of care.    If you have questions, please do not hesitate to call me. I look forward to following Tim Richards along with you.    Sincerely,    Guerda Bautista MD    Enclosure    If you would like to receive this communication electronically, please contact [email or web address] or [phone] to request [employer] access.    [employer] is a tool which provides read-only access to select patient information with whom you have a relationship. Its easy to use and provides real time access to review your patients record including encounter summaries, notes, results, and demographic information.    If you feel you have received this communication in error or would no longer like to receive these types of communications, please e-mail [email or web address]

## 2020-01-29 NOTE — TELEPHONE ENCOUNTER
Patient with AIT with thyroid US showing no increased vascularity and negative TRAb and TSI suggestive of type 2 AIT. He was started on both methimazole and prednisone with a fairly quick response with FT4 going down from 3.9 to 1.1 in only 2 weeks. 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 Will stop methimazole at this point. Will continue prednisone 60 mg. Will recheck TFTs in 2 weeks and determine need to taper steroids.    He has appointment with Dr. Piedra in 1 month.     Hank Sumner MD  Endocrinology Fellow

## 2020-01-29 NOTE — TELEPHONE ENCOUNTER
----- Message from Harry Yun MD sent at 1/21/2020  4:39 PM CST -----  please relay this info to the patient  we'll await a normal T4 level, then schedule him for a complex device upgrade (read: extra time needed)  DPM    ----- Message -----  From: Elizabeth Huerta MD  Sent: 1/21/2020   4:00 PM CST  To: Harry Yun MD    Ideally I would suggest waiting until his free T4 is normal before a procedure (TSH will take weeks or months to improve but not necessary to wait for this). At the rate that his labs are correcting, I would expect that this would be later this week or next although I cannot be certain of this. We have labs scheduled for him on Thursday and I think this will further clarify  Elizabeth    ----- Message -----  From: Harry Yun MD  Sent: 1/21/2020   2:52 PM CST  To: MD Dr. Bradley Pemberton,    I'd like to perform an ICD lead revision on this patient, but while he was an inpatient recently we were asked to hold off until his thyrotoxicosis was more stable. When do you think I can proceed safely?  Thanks  [place]

## 2020-01-29 NOTE — PROGRESS NOTES
"Date of Implant with Heartmate II LVAD: 3/18/18    PATIENT ARRIVED IN CLINIC:  Ambulatory   Accompanied by: alone    Vitals  Temperature, oral:   Temp Readings from Last 1 Encounters:   20 97.4 °F (36.3 °C) (Oral)     Blood Pressure:   BP Readings from Last 3 Encounters:   20 (!) 82/0   20 95/75   19 (!) 95/0        VAD Interrogation:  TXP SACHI INTERROGATIONS 2020   Type HeartMate II - -   Flow 5 - -   Speed 13178 - -   PI 3.9 - -   Power (Jackson) 6.4 - -   LSL 9600 - -   Pulsatility - Pulse Intermittent pulse       History Lo.log  Problems / Issues / Alarms with VAD if any: None noted  VAD Sounds: HM2 Smooth, distant    HCT:   Lab Results   Component Value Date    HCT 51.1 2020    HCT 26 (L) 03/10/2018       Complaints/reason for visit today: recent discharge  Emergency Equipment With Patient: yes   VAD Binder With Patient: no   Reviewed VAD Numbers In Binder: no, did not bring    Any Equipment Issues: None noted (Refer to  note for complete details)    DLES Assessment:  Appearance Of Driveline: "1"  Antibiotics: NO  Velour: no  Manual & Visual Inspection Of Driveline: No kinks or tears noted  Stabilization Device In Use: yes, sun securement device      Assessment:   PAIN: NO  Complaints Of Nausea / Vomiting: None noted    Appearance and Frequency Of Stools: normal and formed without blood & every other day  Color Of Urine: clear/yellow  Coping/Depression/Anxiety: coping okay  Sleep Habits: 6 hrs /night  Sleep Aids: ambien  Showering: Yes, reminded to change dressing immediately after drying off  Activity/Exercise: "walking, but on sick leave"   Driving: Yes. Reminded to pull over should there be an alarm before looking down at controller.    DLES Dressing Care:   Frequency of Dressing Changes: every other day & daily kit  Pt In Need Of Management Kits?:no   It is medically necessary to have VAD management kits in order to " prevent infection or to assist in the healing of an infected DLES.    Labs:    Chemistry        Component Value Date/Time     01/29/2020 1112    K 4.4 01/29/2020 1112    CL 98 01/29/2020 1112    CO2 25 01/29/2020 1112    BUN 40 (H) 01/29/2020 1112    CREATININE 2.2 (H) 01/29/2020 1112    GLU 99 01/29/2020 1112        Component Value Date/Time    CALCIUM 9.9 01/29/2020 1112    ALKPHOS 100 01/29/2020 1112    AST 26 01/29/2020 1112    ALT 33 01/29/2020 1112    BILITOT 0.6 01/29/2020 1112    ESTGFRAFRICA 38.1 (A) 01/29/2020 1112    EGFRNONAA 33.0 (A) 01/29/2020 1112            Magnesium   Date Value Ref Range Status   01/29/2020 2.3 1.6 - 2.6 mg/dL Final       Lab Results   Component Value Date    WBC 11.27 01/29/2020    HGB 15.2 01/29/2020    HCT 51.1 01/29/2020    MCV 86 01/29/2020     01/29/2020       Lab Results   Component Value Date    INR 3.3 (H) 01/29/2020    INR 2.2 (H) 01/20/2020    INR 2.0 (H) 01/19/2020       BNP   Date Value Ref Range Status   01/29/2020 305 (H) 0 - 99 pg/mL Final     Comment:     Values of less than 100 pg/ml are consistent with non-CHF populations.   01/20/2020 495 (H) 0 - 99 pg/mL Final     Comment:     Values of less than 100 pg/ml are consistent with non-CHF populations.   01/17/2020 683 (H) 0 - 99 pg/mL Final     Comment:     Values of less than 100 pg/ml are consistent with non-CHF populations.       LD   Date Value Ref Range Status   01/29/2020 447 (H) 110 - 260 U/L Final     Comment:     Results are increased in hemolyzed samples.   01/20/2020 336 (H) 110 - 260 U/L Final     Comment:     Results are increased in hemolyzed samples.   01/19/2020 340 (H) 110 - 260 U/L Final     Comment:     Results are increased in hemolyzed samples.       Labs reviewed with patient: YES      Patient Satisfaction Survey completed per patient: No  (explained about signature and box to check)  Medication reconciliation: per MA.  Medication Detail updated today: yes  Coumadin Managed by:  "[facility]    Education: Reviewed driveline care, emergency procedures, how to change the controller, alarms with patient, as well as discussed how to page the VAD coordinator in case of an emergency. It is medically necessary to have VAD management kits in order to prevent infection or to assist in the healing of an infected DLES.     Plans/Needs:  Patient seen in clinic s/p discharge. Patient reports that he is starting to feel a lot better. Does state that he sometimes feels "a flutter" in his chest, but is improving since hospital d/c. Reports only SOB with strenuous activity. Ok to return to work in 2 weeks per Dr. Bautista on light duty. Patient's cr elevated, per Dr. Bautista, hold torsemide x2 days and repeat labs on Friday. Patient in agreement.     Patient to RTC in 1 month with TTE.    Hurricane Season: No        "

## 2020-01-29 NOTE — PROCEDURES
TXP SACHI INTERROGATIONS 1/29/2020 1/20/2020 1/20/2020 1/20/2020 1/19/2020 1/19/2020 1/19/2020   Type HeartMate II - - - - - -   Flow 5 - - - - - -   Speed 17499 - - - - - -   PI 3.9 - - - - - -   Power (Jackson) 6.4 - - - - - -   LSL 9600 - - - - - -   Pulsatility - Pulse Intermittent pulse Intermittent pulse Intermittent pulse Intermittent pulse Intermittent pulse   }

## 2020-01-29 NOTE — PROGRESS NOTES
"Subjective:   Patient ID:  Tim Richards is a 54 y.o. male who presents for LVAD followup visit.    Implant Date: 3/8/2018       Heartmate II RPM [id number]  3/9/18 outflow graft changed     INR goal: 2-3   NO Bridge with heparin    TXP SACHI INTERROGATIONS 1/29/2020   Type HeartMate II   Flow 5   Speed 34955   PI 3.9   Power (Jackson) 6.4   LSL 9600   Pulsatility -       HPI   Mr. Richards is a very pleasant 54 yo AAM with DCM, HBP, CHF stage D s/p HM 2 comes for routine visit. He was recently discharged from our service after being treated for GIB and VT. Today he feels better. Has no complaints except that he is having trouble sleeping. VAD speed is at 68287 rpm. No VAD alarms noted on interrogation occasional PI events. BP is 82 (Doppler). DLES is a "1". INR is supratherapeutic at 3.3. LDH is at baseline.     Review of Systems   Constitution: Negative. Negative for chills, decreased appetite, diaphoresis, fever, malaise/fatigue, night sweats, weight gain and weight loss.   Eyes: Negative.    Cardiovascular: Negative for chest pain, claudication, cyanosis, dyspnea on exertion, irregular heartbeat, leg swelling, near-syncope, orthopnea, palpitations, paroxysmal nocturnal dyspnea and syncope.   Respiratory: Negative for cough, hemoptysis and shortness of breath.    Endocrine: Negative.    Hematologic/Lymphatic: Negative.    Skin: Negative for color change, dry skin and nail changes.   Musculoskeletal: Negative.    Gastrointestinal: Negative.    Genitourinary: Negative.    Neurological: Negative for weakness.       Objective:   Blood pressure (!) 82/0, height 6' 1.5" (1.867 m), weight 115.7 kg (255 lb).body mass index is 33.19 kg/m².    Doppler: 82    Physical Exam   Constitutional: He appears well-developed.   BP (!) 82/0 (BP Location: Right arm, Patient Position: Sitting) Comment (BP Method): doppler  Ht 6' 1.5" (1.867 m)   Wt 115.7 kg (255 lb)   BMI 33.19 kg/m²      HENT:   Head: Normocephalic.   Neck: No JVD " "present. Carotid bruit is not present.   Cardiovascular: Regular rhythm and normal heart sounds.   No murmur heard.  Smooth VAD hum. DLES is "1"   Pulmonary/Chest: Effort normal and breath sounds normal. No respiratory distress. He has no wheezes. He has no rales.   Abdominal: Soft. Bowel sounds are normal. He exhibits no distension. There is no tenderness. There is no rebound.   Musculoskeletal: He exhibits no edema.   Neurological: He is alert.   Skin: Skin is warm.   Vitals reviewed.      Lab Results   Component Value Date    WBC 11.27 01/29/2020    HGB 15.2 01/29/2020    HCT 51.1 01/29/2020    MCV 86 01/29/2020     01/29/2020    CO2 25 01/29/2020    CREATININE 2.2 (H) 01/29/2020    CALCIUM 9.9 01/29/2020    ALBUMIN 4.2 01/29/2020    AST 26 01/29/2020     (H) 01/29/2020    ALT 33 01/29/2020     (H) 01/29/2020       Lab Results   Component Value Date    INR 3.3 (H) 01/29/2020    INR 2.2 (H) 01/20/2020    INR 2.0 (H) 01/19/2020       BNP   Date Value Ref Range Status   01/20/2020 495 (H) 0 - 99 pg/mL Final     Comment:     Values of less than 100 pg/ml are consistent with non-CHF populations.   01/17/2020 683 (H) 0 - 99 pg/mL Final     Comment:     Values of less than 100 pg/ml are consistent with non-CHF populations.   01/15/2020 551 (H) 0 - 99 pg/mL Final     Comment:     Values of less than 100 pg/ml are consistent with non-CHF populations.       LD   Date Value Ref Range Status   01/20/2020 336 (H) 110 - 260 U/L Final     Comment:     Results are increased in hemolyzed samples.   01/19/2020 340 (H) 110 - 260 U/L Final     Comment:     Results are increased in hemolyzed samples.   01/18/2020 306 (H) 110 - 260 U/L Final     Comment:     Results are increased in hemolyzed samples.       Assessment:      1. Presence of left ventricular assist device (LVAD)    2. Chronic combined systolic and diastolic heart failure    3. DCM (dilated cardiomyopathy)    4. Essential hypertension        Plan: "   Patient is now NYHA class II. BP is controlled.  INR is supratherapeutic. Coumadin clinic to adjust his dose.   VAD interrogation was performed in clinic  Any VAD management kits dispensed today medically necessary  Recommend 2 gram sodium restriction and 1500cc fluid restriction.  Encourage physical activity with graded exercise program.  Requested patient to weigh themselves daily, and to notify us if their weight increases by more than 3 lbs in 1 day or 5 lbs in 1 week.     Listed for transplant: Not listed. Implanted as DT    UNOS Patient Status  Functional Status: 90% - Able to carry on normal activity: minor symptoms of disease  Physical Capacity: No Limitations  Working for Income: yes  If yes, working activity level: Working Full Time    Guerda Bautista MD

## 2020-01-29 NOTE — TELEPHONE ENCOUNTER
Spoke with patient and advised of Dr Yun and Dr Huerta's recommendations. He verbalizes understanding and actually just had lab work done today. Advised that I will forward message to Dianna for follow up

## 2020-01-30 DIAGNOSIS — T46.2X5A AMIODARONE-INDUCED HYPERTHYROIDISM: ICD-10-CM

## 2020-01-30 DIAGNOSIS — E05.80 AMIODARONE-INDUCED HYPERTHYROIDISM: ICD-10-CM

## 2020-01-30 RX ORDER — PREDNISONE 20 MG/1
60 TABLET ORAL DAILY
Qty: 90 TABLET | Refills: 2 | Status: SHIPPED | OUTPATIENT
Start: 2020-01-30 | End: 2020-03-02 | Stop reason: SDUPTHER

## 2020-01-31 ENCOUNTER — LAB VISIT (OUTPATIENT)
Dept: LAB | Facility: HOSPITAL | Age: 55
End: 2020-01-31
Attending: INTERNAL MEDICINE
Payer: COMMERCIAL

## 2020-01-31 ENCOUNTER — TELEPHONE (OUTPATIENT)
Dept: TRANSPLANT | Facility: CLINIC | Age: 55
End: 2020-01-31

## 2020-01-31 ENCOUNTER — ANTI-COAG VISIT (OUTPATIENT)
Dept: CARDIOLOGY | Facility: CLINIC | Age: 55
End: 2020-01-31
Payer: COMMERCIAL

## 2020-01-31 DIAGNOSIS — Z79.01 LONG TERM (CURRENT) USE OF ANTICOAGULANTS: ICD-10-CM

## 2020-01-31 DIAGNOSIS — Z95.811 LVAD (LEFT VENTRICULAR ASSIST DEVICE) PRESENT: ICD-10-CM

## 2020-01-31 DIAGNOSIS — I50.9 HEART FAILURE: ICD-10-CM

## 2020-01-31 DIAGNOSIS — Z95.811 HEART REPLACED BY HEART ASSIST DEVICE: ICD-10-CM

## 2020-01-31 LAB
ALBUMIN SERPL BCP-MCNC: 3.9 G/DL (ref 3.5–5.2)
ALP SERPL-CCNC: 85 U/L (ref 55–135)
ALT SERPL W/O P-5'-P-CCNC: 32 U/L (ref 10–44)
ANION GAP SERPL CALC-SCNC: 10 MMOL/L (ref 8–16)
AST SERPL-CCNC: 24 U/L (ref 10–40)
BILIRUB SERPL-MCNC: 0.4 MG/DL (ref 0.1–1)
BNP SERPL-MCNC: 624 PG/ML (ref 0–99)
BUN SERPL-MCNC: 20 MG/DL (ref 6–20)
CALCIUM SERPL-MCNC: 9.4 MG/DL (ref 8.7–10.5)
CHLORIDE SERPL-SCNC: 104 MMOL/L (ref 95–110)
CO2 SERPL-SCNC: 24 MMOL/L (ref 23–29)
CREAT SERPL-MCNC: 1.5 MG/DL (ref 0.5–1.4)
EST. GFR  (AFRICAN AMERICAN): >60 ML/MIN/1.73 M^2
EST. GFR  (NON AFRICAN AMERICAN): 52 ML/MIN/1.73 M^2
GLUCOSE SERPL-MCNC: 89 MG/DL (ref 70–110)
INR PPP: 2.3 (ref 0.8–1.2)
POTASSIUM SERPL-SCNC: 4.5 MMOL/L (ref 3.5–5.1)
PROT SERPL-MCNC: 6.7 G/DL (ref 6–8.4)
PROTHROMBIN TIME: 24 SEC (ref 9–12.5)
SODIUM SERPL-SCNC: 138 MMOL/L (ref 136–145)

## 2020-01-31 PROCEDURE — 83880 ASSAY OF NATRIURETIC PEPTIDE: CPT

## 2020-01-31 PROCEDURE — 85610 PROTHROMBIN TIME: CPT

## 2020-01-31 PROCEDURE — 80053 COMPREHEN METABOLIC PANEL: CPT

## 2020-01-31 PROCEDURE — 93793 PR ANTICOAGULANT MGMT FOR PT TAKING WARFARIN: ICD-10-PCS | Mod: S$GLB,,,

## 2020-01-31 PROCEDURE — 93793 ANTICOAG MGMT PT WARFARIN: CPT | Mod: S$GLB,,,

## 2020-01-31 PROCEDURE — 36415 COLL VENOUS BLD VENIPUNCTURE: CPT

## 2020-01-31 NOTE — PROGRESS NOTES
Patient rescheduled 2/03/20 INR lab draw to today with other blood work since last INR was elevated

## 2020-01-31 NOTE — PROGRESS NOTES
Patient wife advised of dose instructions and verbalized understanding.  Will recheck 2/5/2020 at [facility] w/ other appts

## 2020-02-03 ENCOUNTER — PATIENT MESSAGE (OUTPATIENT)
Dept: ELECTROPHYSIOLOGY | Facility: CLINIC | Age: 55
End: 2020-02-03

## 2020-02-03 ENCOUNTER — TELEPHONE (OUTPATIENT)
Dept: ELECTROPHYSIOLOGY | Facility: CLINIC | Age: 55
End: 2020-02-03

## 2020-02-03 DIAGNOSIS — I49.01 VF (VENTRICULAR FIBRILLATION): Primary | ICD-10-CM

## 2020-02-03 NOTE — PROGRESS NOTES
Per EP:  Mr. Richards is scheduled for an ICD Gen change and Lead Revision on 3/9 .  Goal remains 2-3, per Dianna Sosa, MSN, RN, [facility].

## 2020-02-04 DIAGNOSIS — Z95.811 LVAD (LEFT VENTRICULAR ASSIST DEVICE) PRESENT: Primary | ICD-10-CM

## 2020-02-04 RX ORDER — ZOLPIDEM TARTRATE 5 MG/1
5 TABLET ORAL NIGHTLY PRN
Qty: 30 TABLET | Refills: 0 | Status: CANCELLED | OUTPATIENT
Start: 2020-02-04 | End: 2020-08-04

## 2020-02-05 ENCOUNTER — HOSPITAL ENCOUNTER (OUTPATIENT)
Dept: CARDIOLOGY | Facility: CLINIC | Age: 55
Discharge: HOME | End: 2020-02-05
Attending: INTERNAL MEDICINE
Payer: COMMERCIAL

## 2020-02-05 ENCOUNTER — CLINICAL SUPPORT (OUTPATIENT)
Dept: CARDIOLOGY | Facility: HOSPITAL | Age: 55
End: 2020-02-05
Attending: INTERNAL MEDICINE
Payer: COMMERCIAL

## 2020-02-05 ENCOUNTER — PATIENT MESSAGE (OUTPATIENT)
Dept: INTERNAL MEDICINE | Facility: CLINIC | Age: 55
End: 2020-02-05

## 2020-02-05 ENCOUNTER — PATIENT MESSAGE (OUTPATIENT)
Dept: TRANSPLANT | Facility: CLINIC | Age: 55
End: 2020-02-05

## 2020-02-05 ENCOUNTER — TELEPHONE (OUTPATIENT)
Dept: ELECTROPHYSIOLOGY | Facility: CLINIC | Age: 55
End: 2020-02-05

## 2020-02-05 VITALS — HEART RATE: 88 BPM | HEIGHT: 73 IN | BODY MASS INDEX: 33.53 KG/M2 | WEIGHT: 253 LBS

## 2020-02-05 DIAGNOSIS — Z95.811 PRESENCE OF LEFT VENTRICULAR ASSIST DEVICE (LVAD): ICD-10-CM

## 2020-02-05 DIAGNOSIS — I47.20 VT (VENTRICULAR TACHYCARDIA): ICD-10-CM

## 2020-02-05 DIAGNOSIS — Z95.810 AUTOMATIC IMPLANTABLE CARDIAC DEFIBRILLATOR IN SITU: ICD-10-CM

## 2020-02-05 LAB
ASCENDING AORTA: 3.34 CM
BSA FOR ECHO PROCEDURE: 2.43 M2
CV ECHO LV RWT: 0.21 CM
DOP CALC LVOT AREA: 4.6 CM2
DOP CALC LVOT DIAMETER: 2.42 CM
E WAVE DECELERATION TIME: 152.15 MSEC
E/A RATIO: 1.22
E/E' RATIO: 23.33 M/S
ECHO LV POSTERIOR WALL: 1.06 CM (ref 0.6–1.1)
FRACTIONAL SHORTENING: 5 % (ref 28–44)
INTERVENTRICULAR SEPTUM: 0.88 CM (ref 0.6–1.1)
LEFT INTERNAL DIMENSION IN SYSTOLE: 9.57 CM (ref 2.1–4)
LEFT VENTRICLE DIASTOLIC VOLUME INDEX: 240.03 ML/M2
LEFT VENTRICLE DIASTOLIC VOLUME: 570.76 ML
LEFT VENTRICLE MASS INDEX: 248 G/M2
LEFT VENTRICLE SYSTOLIC VOLUME INDEX: 215.5 ML/M2
LEFT VENTRICLE SYSTOLIC VOLUME: 512.53 ML
LEFT VENTRICULAR INTERNAL DIMENSION IN DIASTOLE: 10.05 CM (ref 3.5–6)
LEFT VENTRICULAR MASS: 590.16 G
LV LATERAL E/E' RATIO: 17.5 M/S
LV SEPTAL E/E' RATIO: 35 M/S
MV PEAK A VEL: 0.86 M/S
MV PEAK E VEL: 1.05 M/S
PISA TR MAX VEL: 1.62 M/S
RA PRESSURE: 3 MMHG
SINUS: 3.83 CM
STJ: 2.91 CM
TDI LATERAL: 0.06 M/S
TDI SEPTAL: 0.03 M/S
TDI: 0.05 M/S
TR MAX PG: 10 MMHG
TV REST PULMONARY ARTERY PRESSURE: 13 MMHG

## 2020-02-05 PROCEDURE — 93306 TTE W/DOPPLER COMPLETE: CPT | Mod: 26,,, | Performed by: INTERNAL MEDICINE

## 2020-02-05 PROCEDURE — C8929 TTE W OR WO FOL WCON,DOPPLER: HCPCS

## 2020-02-05 PROCEDURE — 93283 PRGRMG EVAL IMPLANTABLE DFB: CPT

## 2020-02-05 PROCEDURE — 93306 ECHO (CUPID ONLY): ICD-10-PCS | Mod: 26,,, | Performed by: INTERNAL MEDICINE

## 2020-02-05 NOTE — PROGRESS NOTES
Procedure explained.  22 g sl started in left forearm for optison use. Per jewell monte, optison given ivp via sl for imaging. Denies transfusion rxn. Tolerated well. Sl d/haseeb after, pressure applied.

## 2020-02-06 ENCOUNTER — PATIENT MESSAGE (OUTPATIENT)
Dept: TRANSPLANT | Facility: CLINIC | Age: 55
End: 2020-02-06

## 2020-02-06 ENCOUNTER — PATIENT MESSAGE (OUTPATIENT)
Dept: INTERNAL MEDICINE | Facility: CLINIC | Age: 55
End: 2020-02-06

## 2020-02-06 ENCOUNTER — ANTI-COAG VISIT (OUTPATIENT)
Dept: CARDIOLOGY | Facility: CLINIC | Age: 55
End: 2020-02-06
Payer: COMMERCIAL

## 2020-02-06 DIAGNOSIS — Z95.811 LVAD (LEFT VENTRICULAR ASSIST DEVICE) PRESENT: ICD-10-CM

## 2020-02-06 DIAGNOSIS — Z79.01 LONG TERM (CURRENT) USE OF ANTICOAGULANTS: ICD-10-CM

## 2020-02-06 PROCEDURE — 93793 PR ANTICOAGULANT MGMT FOR PT TAKING WARFARIN: ICD-10-PCS | Mod: S$GLB,,,

## 2020-02-06 PROCEDURE — 93793 ANTICOAG MGMT PT WARFARIN: CPT | Mod: S$GLB,,,

## 2020-02-06 NOTE — TELEPHONE ENCOUNTER
Explained we will do labs after the labs  Also explained he may not get ambien   He states he wants something for sleep   He will come appointment to establish

## 2020-02-07 ENCOUNTER — OFFICE VISIT (OUTPATIENT)
Dept: INTERNAL MEDICINE | Facility: CLINIC | Age: 55
End: 2020-02-07
Payer: COMMERCIAL

## 2020-02-07 ENCOUNTER — TELEPHONE (OUTPATIENT)
Dept: TRANSPLANT | Facility: CLINIC | Age: 55
End: 2020-02-07

## 2020-02-07 ENCOUNTER — LAB VISIT (OUTPATIENT)
Dept: LAB | Facility: HOSPITAL | Age: 55
End: 2020-02-07
Attending: INTERNAL MEDICINE
Payer: COMMERCIAL

## 2020-02-07 VITALS — BODY MASS INDEX: 34.06 KG/M2 | WEIGHT: 257 LBS | HEIGHT: 73 IN

## 2020-02-07 DIAGNOSIS — I50.9 HEART FAILURE: ICD-10-CM

## 2020-02-07 DIAGNOSIS — Z95.811 HEART REPLACED BY HEART ASSIST DEVICE: ICD-10-CM

## 2020-02-07 DIAGNOSIS — F19.982 SUBSTANCE OR MEDICATION-INDUCED SLEEP DISORDER, INSOMNIA TYPE: Primary | ICD-10-CM

## 2020-02-07 LAB
ALBUMIN SERPL BCP-MCNC: 4.2 G/DL (ref 3.5–5.2)
ALP SERPL-CCNC: 85 U/L (ref 55–135)
ALT SERPL W/O P-5'-P-CCNC: 35 U/L (ref 10–44)
ANION GAP SERPL CALC-SCNC: 14 MMOL/L (ref 8–16)
AST SERPL-CCNC: 28 U/L (ref 10–40)
BASOPHILS # BLD AUTO: 0.01 K/UL (ref 0–0.2)
BASOPHILS NFR BLD: 0.1 % (ref 0–1.9)
BILIRUB DIRECT SERPL-MCNC: 0.3 MG/DL (ref 0.1–0.3)
BILIRUB SERPL-MCNC: 0.6 MG/DL (ref 0.1–1)
BNP SERPL-MCNC: 99 PG/ML (ref 0–99)
BUN SERPL-MCNC: 33 MG/DL (ref 6–20)
CALCIUM SERPL-MCNC: 10.1 MG/DL (ref 8.7–10.5)
CHLORIDE SERPL-SCNC: 98 MMOL/L (ref 95–110)
CHOLEST SERPL-MCNC: 287 MG/DL (ref 120–199)
CHOLEST/HDLC SERPL: ABNORMAL {RATIO} (ref 2–5)
CO2 SERPL-SCNC: 27 MMOL/L (ref 23–29)
CREAT SERPL-MCNC: 2.7 MG/DL (ref 0.5–1.4)
CRP SERPL-MCNC: 8.4 MG/L (ref 0–8.2)
DIFFERENTIAL METHOD: ABNORMAL
EOSINOPHIL # BLD AUTO: 0 K/UL (ref 0–0.5)
EOSINOPHIL NFR BLD: 0.1 % (ref 0–8)
ERYTHROCYTE [DISTWIDTH] IN BLOOD BY AUTOMATED COUNT: 15.7 % (ref 11.5–14.5)
EST. GFR  (AFRICAN AMERICAN): 30 ML/MIN/1.73 M^2
EST. GFR  (NON AFRICAN AMERICAN): 26 ML/MIN/1.73 M^2
GLUCOSE SERPL-MCNC: 125 MG/DL (ref 70–110)
HCT VFR BLD AUTO: 50 % (ref 40–54)
HDLC SERPL-MCNC: >140 MG/DL (ref 40–75)
HDLC SERPL: ABNORMAL % (ref 20–50)
HGB BLD-MCNC: 15.3 G/DL (ref 14–18)
IMM GRANULOCYTES # BLD AUTO: 0.05 K/UL (ref 0–0.04)
IMM GRANULOCYTES NFR BLD AUTO: 0.6 % (ref 0–0.5)
LDH SERPL L TO P-CCNC: 513 U/L (ref 110–260)
LDLC SERPL CALC-MCNC: ABNORMAL MG/DL (ref 63–159)
LYMPHOCYTES # BLD AUTO: 0.3 K/UL (ref 1–4.8)
LYMPHOCYTES NFR BLD: 3.9 % (ref 18–48)
MAGNESIUM SERPL-MCNC: 2.1 MG/DL (ref 1.6–2.6)
MCH RBC QN AUTO: 25.5 PG (ref 27–31)
MCHC RBC AUTO-ENTMCNC: 30.6 G/DL (ref 32–36)
MCV RBC AUTO: 83 FL (ref 82–98)
MONOCYTES # BLD AUTO: 0.2 K/UL (ref 0.3–1)
MONOCYTES NFR BLD: 3.1 % (ref 4–15)
NEUTROPHILS # BLD AUTO: 7.2 K/UL (ref 1.8–7.7)
NEUTROPHILS NFR BLD: 92.2 % (ref 38–73)
NONHDLC SERPL-MCNC: ABNORMAL MG/DL
NRBC BLD-RTO: 0 /100 WBC
PHOSPHATE SERPL-MCNC: 2.9 MG/DL (ref 2.7–4.5)
PLATELET # BLD AUTO: 191 K/UL (ref 150–350)
PMV BLD AUTO: 11.1 FL (ref 9.2–12.9)
POTASSIUM SERPL-SCNC: 3.8 MMOL/L (ref 3.5–5.1)
PREALB SERPL-MCNC: 40 MG/DL (ref 20–43)
PROT SERPL-MCNC: 7.5 G/DL (ref 6–8.4)
RBC # BLD AUTO: 6.01 M/UL (ref 4.6–6.2)
SODIUM SERPL-SCNC: 139 MMOL/L (ref 136–145)
TRIGL SERPL-MCNC: 56 MG/DL (ref 30–150)
WBC # BLD AUTO: 7.78 K/UL (ref 3.9–12.7)

## 2020-02-07 PROCEDURE — 83735 ASSAY OF MAGNESIUM: CPT

## 2020-02-07 PROCEDURE — 36415 COLL VENOUS BLD VENIPUNCTURE: CPT

## 2020-02-07 PROCEDURE — 83880 ASSAY OF NATRIURETIC PEPTIDE: CPT

## 2020-02-07 PROCEDURE — 84100 ASSAY OF PHOSPHORUS: CPT

## 2020-02-07 PROCEDURE — 85025 COMPLETE CBC W/AUTO DIFF WBC: CPT

## 2020-02-07 PROCEDURE — 99999 PR PBB SHADOW E&M-EST. PATIENT-LVL III: CPT | Mod: PBBFAC,,, | Performed by: STUDENT IN AN ORGANIZED HEALTH CARE EDUCATION/TRAINING PROGRAM

## 2020-02-07 PROCEDURE — 84134 ASSAY OF PREALBUMIN: CPT

## 2020-02-07 PROCEDURE — 99999 PR PBB SHADOW E&M-EST. PATIENT-LVL III: ICD-10-PCS | Mod: PBBFAC,,, | Performed by: STUDENT IN AN ORGANIZED HEALTH CARE EDUCATION/TRAINING PROGRAM

## 2020-02-07 PROCEDURE — 83615 LACTATE (LD) (LDH) ENZYME: CPT

## 2020-02-07 PROCEDURE — 82248 BILIRUBIN DIRECT: CPT

## 2020-02-07 PROCEDURE — 99203 OFFICE O/P NEW LOW 30 MIN: CPT | Mod: S$GLB,,, | Performed by: STUDENT IN AN ORGANIZED HEALTH CARE EDUCATION/TRAINING PROGRAM

## 2020-02-07 PROCEDURE — 86140 C-REACTIVE PROTEIN: CPT

## 2020-02-07 PROCEDURE — 80061 LIPID PANEL: CPT

## 2020-02-07 PROCEDURE — 80053 COMPREHEN METABOLIC PANEL: CPT

## 2020-02-07 PROCEDURE — 99203 PR OFFICE/OUTPT VISIT, NEW, LEVL III, 30-44 MIN: ICD-10-PCS | Mod: S$GLB,,, | Performed by: STUDENT IN AN ORGANIZED HEALTH CARE EDUCATION/TRAINING PROGRAM

## 2020-02-07 RX ORDER — ZOLPIDEM TARTRATE 6.25 MG/1
6.25 TABLET, FILM COATED, EXTENDED RELEASE ORAL NIGHTLY PRN
Qty: 30 TABLET | Refills: 0 | Status: SHIPPED | OUTPATIENT
Start: 2020-02-07 | End: 2020-02-20

## 2020-02-07 NOTE — TELEPHONE ENCOUNTER
Labs reviewed by Dr. Rhodes. After speaking with the pt, pt admitted to taking 2-3 extra Torsemide 2 days ago because he felt that he had extra fluid. Per Dr. Rhodes's instructions, pt to hold torsemide Saturday and Sunday, recheck labs Monday.  Reviewed with pt via p/c who verbalized understanding.

## 2020-02-07 NOTE — PATIENT INSTRUCTIONS
Insomnia  Insomnia is repeated difficulty going to sleep or staying asleep, or both. Whether you have insomnia is not defined by a specific amount of sleep. Different people need different amounts of sleep, and you may need more or less sleep at different times of your life.  There are 3 major types of insomnia:  short-term, chronic, and other.  Short-term, or acute insomnia lasts less than 3 months.  The symptoms are temporary and can be linked directly to a stressor, such as the death of a loved one, financial problems, or a new physical problem.  Short-term insomnia stops when the stressor resolves or the person adapts to its presence.  Chronic insomnia occurs at least 3 times a week and lasts longer than 3 months.  Chronic insomnia can occur when either the cause of the sleeping problem is not clear, or the insomnia does not get better when the stressor is resolved. A number of other criteria are also used to make the diagnosis of chronic insomnia.   Other insomnia is the third type of insomnia-related sleep disorders.  This description applies to people who have problems getting to sleep or staying asleep, but do not meet all of the factors that describe either short-term or chronic insomnia.    Many things cause insomnia. Different people may have different causes. It can be from an underlying medical or psychological condition, or lifestyle. It can also be primary insomnia, which means no cause can be found.  Causes of insomnia include:  · Chronic medical problems- heart disease, gastrointestinal problems, hormonal changes, breathing problems  · Anxiety  · Stress  · Depression  · Pain  · Work schedule  · Sleep apnea  · Illegal drugs  · Certain medicines  Many different medidcines can affect your sleep, such as stimulants, caffeine, alcohol, some decongestants, and diet pills. Other medicines may include some types of blood pressure pills, steroids, asthma medicines, antihistamines, antidepressants,  seizure medicines and statins. Not all of these will affect your sleep, and they shouldnt be stopped without talking to your doctor.  Symptoms of insomnia can include:  · Lying awake for long periods at night before falling asleep  · Waking up several times during the night  · Waking up early in the morning and not being able to get back to sleep  · Feeling tired and not refreshed by sleep  · Not being able to function properly during the day and finding it hard to concentrate  · Irritability  · Tiredness and fatigue during the day  Home care  1. Review your medicines with your doctor or pharmacist to find out if they can cause insomnia. Not all medicines will affect your sleep, but they shouldn't be stopped without reviewing them with your doctor. There may be serious side effects and consequences from suddenly stopping your medicines. Not taking them may cause strokes, heart attacks, and many other problems.  2. Caffeine, smoking and alcohol also affect sleep. Limit your daily use and do not use these before bedtime. Alcohol may make you sleepy at first, but as its effects wear off, you may awaken a few hours later and have trouble returning to sleep.  3. Do not exercise, eat or drink large amounts of liquid within 2 hours of your bedtime.  4. Improve your sleep habits. Have a fixed bed and wake-up time. Try to keep noise, light and heat in your bedroom at a comfortable level. Try using earplugs or eyeshades if needed.   5. Avoid watching TV in bed.  6. If you do not fall asleep within 30 minutes, try to relax by reading or listening to soft music.  7. Limit daytime napping to one 30 minute period, early in the day.  8. Get regular exercise. Find other ways to lessen your stress level.  9. If a medicine was prescribed to help reset your sleep patterns, take it as directed. Sleeping pills are intended for short-term use, only. If taken for too long, the effect wears off while the risk of physical addiction and  psychological dependence increases.  Sleep diary  If the cause isnt obvious and it is not improving, try keeping a sleep diary for a couple of weeks. Include in it:  · The time you go to bed  · How long it takes to fall asleep  · How many times you wake up  · What time you wake up  · Your meal times and what you eat  · What time you drink alcohol  · Your exercise habits and times  Follow-up care  Follow up with your healthcare provider, or as advised. If X-rays or CT scans were done, you will be notified if there is a change in the reading, especially if it affects treatment.  Call 911  Call 911 if any of these occur:  · Trouble breathing  · Confusion or trouble waking  · Fainting or loss of consciousness  · Rapid heart rate  · New chest, arm, shoulder, neck or upper back pain  · Trouble with speech or vision, weakness of an arm or leg  · Trouble walking or talking, loss of balance, numbness or weakness in one side of your body, facial droop  When to seek medical advice  Call your healthcare provider right away if any of these occur:  · Extreme restlessness or irritability  · Confusion or hallucinations (seeing or hearing things that are not there)  · Anxiety, depression  · Several days without sleeping  Date Last Reviewed: 11/19/2015  © 9186-3520 [employer]. [address] [place] [address], [place] [address]. All rights reserved. This information is not intended as a substitute for professional medical care. Always follow your healthcare professional's instructions.

## 2020-02-07 NOTE — PROGRESS NOTES
Answers for HPI/ROS submitted by the patient on 2/5/2020   activity change: No  unexpected weight change: Yes  neck pain: No  hearing loss: No  rhinorrhea: No  trouble swallowing: No  eye discharge: No  visual disturbance: No  chest tightness: Yes  wheezing: No  chest pain: Yes  palpitations: Yes  blood in stool: No  constipation: No  vomiting: No  diarrhea: No  polydipsia: Yes  polyuria: No  difficulty urinating: No  urgency: No  hematuria: No  joint swelling: No  arthralgias: No  headaches: No  weakness: Yes  confusion: Yes  dysphoric mood: Yes      [facility]IDENT [facility]IC  CLINIC NOTE    Patient Name: Tim Richards  YOB: 1965    PRESENTING HISTORY       History of Present Illness:  Mr. Tim Richards is a 54 y.o. male w/ significant cardiac history, on torsemide for diuresis, is an LVAD pt (DCM, EF<10%), has an ICD (2014), was admitted last month with Torsades (ICD shocked 7 times) and was found to have severe hyperthyroidism from being on amiodarone (TSH undetectable) and was started on 60 mg prednisone (was on 30 mg prednisone before and had run out of refills before the events happened) is here to establish care.     Patient reports being prescribed Ambien upon discharge for insomnia during hospital stay as patient had adverse events with melatonin (vivid 'crazy' dreams and skin itch). Patient was prescribed 5 mg Ambien but was taking 10 mg instead and still sleeping for 6-7 hours straight is an issue despite that. Patient reports he ends up eating lot of junk food because being awake at night. Has gained 3 lbs in last 2 days. Patient reports following good sleep hygiene otherwise but ends up eating food high on carbs in the middle of the night often.     Patient also reports noticing fluid in his legs and more swelling so took extra doses of torsemide as was advised earlier by the cardiologist per the patient. Which helped him get the fluid off however patient's creat  increased to 2.7 from 1.5. Similar event happened 10 days ago when patient was then advised to hold off torsemide for 2 days and Creat improved from 2.2 to 1.5 which now has gone back to 2.7. Other labs today - patient's LDH elevated to 500s (ordered by cardiologist)    Patient does not smoke cigarettes or drink alcohol. No illicit drugs  Patient is up-to-date on health maintenance.     Review of Systems   Constitutional: Negative for chills and fever. Malaise/fatigue: reports due to sleeplessness.   HENT: Negative for congestion, hearing loss, sinus pain and sore throat.    Eyes: Negative for blurred vision and discharge.   Respiratory: Negative for wheezing.    Cardiovascular: Positive for chest pain (Patient reports no chest pain today but for some reason has selected yes on patient reported answers) and palpitations (Patient reports no palpitations today but for some reason has selected yes on patient reported answer).   Gastrointestinal: Negative for abdominal pain, blood in stool, constipation, diarrhea, heartburn, melena, nausea and vomiting.   Genitourinary: Negative for hematuria and urgency.   Musculoskeletal: Negative for neck pain.   Skin: Negative for rash.   Neurological: Positive for dizziness (patient has been told it is due to doxazosin). Negative for weakness and headaches.   Endo/Heme/Allergies: Positive for polydipsia. Does not bruise/bleed easily.   Psychiatric/Behavioral: The patient has insomnia.        PAST HISTORY:     Past Medical History:   Diagnosis Date    CHF (congestive heart failure)     Dilated cardiomyopathy 1/10/2018    Disorder of kidney and ureter     CKD    Gout     HTN (hypertension)     Ventricular tachycardia (paroxysmal)        Past Surgical History:   Procedure Laterality Date    CARDIAC CATHETERIZATION  Dec. 2012    CARDIAC DEFIBRILLATOR PLACEMENT Left     CRRT-D    COLONOSCOPY N/A 3/6/2018    Procedure: COLONOSCOPY;  Surgeon: Alonso Bone MD;  Location: [facility]  ENDO (2ND FLR);  Service: Endoscopy;  Laterality: N/A;    COLONOSCOPY N/A 7/17/2019    Procedure: COLONOSCOPY;  Surgeon: Blane Valdez MD;  Location: [facility] ENDO (2ND FLR);  Service: Endoscopy;  Laterality: N/A;    COLONOSCOPY N/A 7/18/2019    Procedure: COLONOSCOPY;  Surgeon: Blane Valdez MD;  Location: [facility] ENDO (2ND FLR);  Service: Endoscopy;  Laterality: N/A;    ESOPHAGOGASTRODUODENOSCOPY N/A 7/17/2019    Procedure: EGD (ESOPHAGOGASTRODUODENOSCOPY);  Surgeon: Blane Valdez MD;  Location: [facility] ENDO (2ND FLR);  Service: Endoscopy;  Laterality: N/A;    ESOPHAGOGASTRODUODENOSCOPY N/A 7/18/2019    Procedure: EGD (ESOPHAGOGASTRODUODENOSCOPY);  Surgeon: Blane Valdez MD;  Location: [facility] ENDO (2ND FLR);  Service: Endoscopy;  Laterality: N/A;    NONINVASIVE CARDIAC ELECTROPHYSIOLOGY STUDY N/A 10/18/2019    Procedure: CARDIAC ELECTROPHYSIOLOGY STUDY, NONINVASIVE;  Surgeon: Raz Wagner MD;  Location: [facility] EP LAB;  Service: Cardiology;  Laterality: N/A;  VT, DFTs, MDT CRTD in situ, LVAD, LASHELL pizarro, 3099    TREATMENT OF CARDIAC ARRHYTHMIA  10/18/2019    Procedure: Cardioversion or Defibrillation;  Surgeon: Raz Wagner MD;  Location: [facility] EP LAB;  Service: Cardiology;;       Family History   Problem Relation Age of Onset    Hypertension Father     Diabetes Father     Coronary artery disease Father     Heart disease Father         CHF    No Known Problems Mother     Cancer Sister 54        breast CA    No Known Problems Brother        Social History     Socioeconomic History    Marital status:      Spouse name: Not on file    Number of children: Not on file    Years of education: Not on file    Highest education level: Not on file   Occupational History     Employer: [employer]    Social Needs    Financial resource strain: Not hard at all    Food insecurity:     Worry: Never true     Inability: Never true    Transportation needs:     Medical: No     Non-medical: No   Tobacco Use    Smoking  status: Former Smoker     Packs/day: 1.00     Years: 31.00     Pack years: 31.00     Types: Cigarettes     Last attempt to quit: 2018     Years since quittin.0    Smokeless tobacco: Never Used    Tobacco comment: pt is quiting on his own - pt stated not qualified for program;  pt  quit on his own   Substance and Sexual Activity    Alcohol use: No     Alcohol/week: 0.0 standard drinks     Frequency: Never     Drinks per session: Patient refused     Binge frequency: Never     Comment: one fifth of gin or rum/week    Drug use: No    Sexual activity: Yes     Partners: Female     Birth control/protection: None     Comment: 10/5/17  with same partner 7 years    Lifestyle    Physical activity:     Days per week: Not on file     Minutes per session: 60 min    Stress: Very much   Relationships    Social connections:     Talks on phone: More than three times a week     Gets together: More than three times a week     Attends Cheondoism service: Not on file     Active member of club or organization: Yes     Attends meetings of clubs or organizations: More than 4 times per year     Relationship status:    Other Topics Concern    Not on file   Social History Narrative    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    amLODIPine (NORVASC) 10 MG tablet Take 1 tablet (10 mg total) by mouth once daily.    doxazosin (CARDURA) 8 MG Tab Take 1 tablet (8 mg total) by mouth every 12 (twelve) hours.    ferrous gluconate (FERGON) 324 MG tablet Take 1 tablet (324 mg total) by mouth daily with breakfast.    magnesium oxide (MAG-OX) 400 mg (241.3 mg magnesium) tablet TAKE 1 TABLET (400 MG TOTAL) BY MOUTH 3 (THREE) TIMES DAILY.     "methIMAzole (TAPAZOLE) 10 MG Tab TAKE 2 TABLETS BY MOUTH EVERY MORNING AND TAKE 1 TABLET EVERY EVENING    pantoprazole (PROTONIX) 40 MG tablet TAKE 1 TABLET (40 MG TOTAL) BY MOUTH ONCE DAILY.    potassium chloride (K-TAB) 20 mEq Take 2 tablets (40 mEq total) by mouth once daily.    predniSONE (DELTASONE) 20 MG tablet Take 3 tablets (60 mg total) by mouth once daily.    sildenafil (VIAGRA) 100 MG tablet Take 1 tablet (100 mg total) by mouth daily as needed for Erectile Dysfunction.    torsemide (DEMADEX) 20 MG Tab Take 2 tablets (40 mg total) by mouth once daily.    warfarin (COUMADIN) 5 MG tablet TAKE 1 TABLETS (5MG) BY MOUTH ON TUES, THURS, SAT. TAKE 1.5 TABLETS (7.5MG) ALL OTHER DAYS.    warfarin (COUMADIN) 5 MG tablet Take 1 tablet (5 mg total) by mouth Daily.    [DISCONTINUED] zolpidem (AMBIEN) 5 MG Tab Take 1 tablet (5 mg total) by mouth nightly as needed.     No current facility-administered medications on file prior to visit.        Review of patient's allergies indicates:   Allergen Reactions    Lisinopril Anaphylaxis    Hydralazine analogues      Chronic constipation, impotence, dizziness       OBJECTIVE:   Vital Signs:  Vitals:    02/07/20 1323   Weight: 116.6 kg (257 lb)   Height: 6' 1" (1.854 m)       Recent Results (from the past 24 hour(s))   Bilirubin, direct    Collection Time: 02/07/20 12:19 PM   Result Value Ref Range    Bilirubin, Direct 0.3 0.1 - 0.3 mg/dL   Brain natriuretic peptide    Collection Time: 02/07/20 12:19 PM   Result Value Ref Range    BNP 99 0 - 99 pg/mL   CBC auto differential    Collection Time: 02/07/20 12:19 PM   Result Value Ref Range    WBC 7.78 3.90 - 12.70 K/uL    RBC 6.01 4.60 - 6.20 M/uL    Hemoglobin 15.3 14.0 - 18.0 g/dL    Hematocrit 50.0 40.0 - 54.0 %    Mean Corpuscular Volume 83 82 - 98 fL    Mean Corpuscular Hemoglobin 25.5 (L) 27.0 - 31.0 pg    Mean Corpuscular Hemoglobin Conc 30.6 (L) 32.0 - 36.0 g/dL    RDW 15.7 (H) 11.5 - 14.5 %    Platelets 191 150 - " 350 K/uL    MPV 11.1 9.2 - 12.9 fL    Immature Granulocytes 0.6 (H) 0.0 - 0.5 %    Gran # (ANC) 7.2 1.8 - 7.7 K/uL    Immature Grans (Abs) 0.05 (H) 0.00 - 0.04 K/uL    Lymph # 0.3 (L) 1.0 - 4.8 K/uL    Mono # 0.2 (L) 0.3 - 1.0 K/uL    Eos # 0.0 0.0 - 0.5 K/uL    Baso # 0.01 0.00 - 0.20 K/uL    nRBC 0 0 /100 WBC    Gran% 92.2 (H) 38.0 - 73.0 %    Lymph% 3.9 (L) 18.0 - 48.0 %    Mono% 3.1 (L) 4.0 - 15.0 %    Eosinophil% 0.1 0.0 - 8.0 %    Basophil% 0.1 0.0 - 1.9 %    Differential Method Automated    Comprehensive metabolic panel    Collection Time: 02/07/20 12:19 PM   Result Value Ref Range    Sodium 139 136 - 145 mmol/L    Potassium 3.8 3.5 - 5.1 mmol/L    Chloride 98 95 - 110 mmol/L    CO2 27 23 - 29 mmol/L    Glucose 125 (H) 70 - 110 mg/dL    BUN, Bld 33 (H) 6 - 20 mg/dL    Creatinine 2.7 (H) 0.5 - 1.4 mg/dL    Calcium 10.1 8.7 - 10.5 mg/dL    Total Protein 7.5 6.0 - 8.4 g/dL    Albumin 4.2 3.5 - 5.2 g/dL    Total Bilirubin 0.6 0.1 - 1.0 mg/dL    Alkaline Phosphatase 85 55 - 135 U/L    AST 28 10 - 40 U/L    ALT 35 10 - 44 U/L    Anion Gap 14 8 - 16 mmol/L    eGFR if African American 30 (A) >60 mL/min/1.73 m^2    eGFR if non African American 26 (A) >60 mL/min/1.73 m^2   C-reactive protein    Collection Time: 02/07/20 12:19 PM   Result Value Ref Range    CRP 8.4 (H) 0.0 - 8.2 mg/L   Lactate dehydrogenase    Collection Time: 02/07/20 12:19 PM   Result Value Ref Range     (H) 110 - 260 U/L   Lipid panel    Collection Time: 02/07/20 12:19 PM   Result Value Ref Range    Cholesterol 287 (H) 120 - 199 mg/dL    Triglycerides 56 30 - 150 mg/dL    HDL >140 (H) 40 - 75 mg/dL    LDL Cholesterol Unable to calculate 63.0 - 159.0 mg/dL    Hdl/Cholesterol Ratio Unable to calculate 20.0 - 50.0 %    Total Cholesterol/HDL Ratio Unable to calculate 2.0 - 5.0    Non-HDL Cholesterol Unable to calculate mg/dL   Magnesium    Collection Time: 02/07/20 12:19 PM   Result Value Ref Range    Magnesium 2.1 1.6 - 2.6 mg/dL   Phosphorus     Collection Time: 02/07/20 12:19 PM   Result Value Ref Range    Phosphorus 2.9 2.7 - 4.5 mg/dL   Prealbumin    Collection Time: 02/07/20 12:19 PM   Result Value Ref Range    Prealbumin 40 20 - 43 mg/dL         Physical Exam   Constitutional: He is oriented to person, place, and time and well-developed, well-nourished, and in no distress. No distress.   HENT:   Head: Normocephalic and atraumatic.   Mouth/Throat: Oropharynx is clear and moist. No oropharyngeal exudate.   Eyes: Conjunctivae and EOM are normal. Right eye exhibits no discharge. Left eye exhibits no discharge. No scleral icterus.   Neck: Normal range of motion. Neck supple. No JVD present. No thyromegaly present.   Cardiovascular: Intact distal pulses.   Has an LVAD   Pulmonary/Chest: Effort normal and breath sounds normal. No respiratory distress. He has no wheezes. He exhibits no tenderness.   Abdominal: Soft. Bowel sounds are normal. He exhibits no distension. There is no tenderness.   Musculoskeletal: Normal range of motion. He exhibits no edema or tenderness.   Lymphadenopathy:     He has no cervical adenopathy.   Neurological: He is alert and oriented to person, place, and time.   Skin: Skin is warm and dry. He is not diaphoretic.   Psychiatric: Mood, memory, affect and judgment normal.       ASSESSMENT & PLAN:     Tim was seen today for establish care. Patient has multiple cardiac co-morbidities however reports being in good health and good spirits otherwise. Concerns of insomnia likely due to prednisone (60mg daily). Patient reports taking it in morning (not evening) however insomnia is commonly reported with prednisone. Patient did not tolerate melatonin well and was prescribed zolpidem upon last hosp discharge. Patient taking double the prescribed dose (10mg) and is running out of prescription now. Would recommend one month of ambien and explained the patient this is neither his insomnia is expected to last permanently nor is this a permanent  solution. He needs to follow up with his endocrine doctor to get an assessment on how long before they remove his thyroid gland (likely waiting for euthyroid status and TSH stabilization ?!)   Patient also advised not to drive after taking zolpidem and patient verbalizes understanding.     Will need a follow up in 2 weeks to re assess the concern and address the need for a longer duration of treatment depending on how long he is going to be on prednisone. If patient requiring higher dose, can discuss other options of sleep medications in next visit. If the Ambien Extended Release prescription was not covered by patient's insurance- we can switch back to regular Ambien (10 or 12.5 mg).    Diagnoses and all orders for this visit:    Substance or medication-induced sleep disorder, insomnia type  -     zolpidem (AMBIEN CR) 6.25 MG CR tablet; Take 1 tablet (6.25 mg total) by mouth nightly as needed for Insomnia.        RTC in 2 weeks  Care discussed with Dr Jennifer Connors MD  Internal Medicine PGY-1

## 2020-02-09 NOTE — PROGRESS NOTES
I have reviewed and concur with the resident's history, physical, assessment, and plan.  I have personally interviewed and examined the patient at bedside.  See below addendum for my evaluation and additional findings.  Pt. With complex medical history currently on LVAD who presents to establish care also requesting refills of Ambien.  Pt. With likely short term  insomnia disorder related to high dose prednisone and hyperarousal.  Will renew patients ambien and also encourage patient to practice good sleep hygiene techiniques  And also informed patient that higher dose may impair alertness in am.  Pt. Will likely be able to discontinue ambien when his prednisone dose is decreased and stopped.  Pt. Is to follow up in 4weeks.

## 2020-02-11 ENCOUNTER — PATIENT MESSAGE (OUTPATIENT)
Dept: ENDOCRINOLOGY | Facility: CLINIC | Age: 55
End: 2020-02-11

## 2020-02-11 ENCOUNTER — PATIENT MESSAGE (OUTPATIENT)
Dept: TRANSPLANT | Facility: CLINIC | Age: 55
End: 2020-02-11

## 2020-02-13 ENCOUNTER — PATIENT MESSAGE (OUTPATIENT)
Dept: MEDSURG UNIT | Facility: HOSPITAL | Age: 55
End: 2020-02-13

## 2020-02-13 ENCOUNTER — PATIENT MESSAGE (OUTPATIENT)
Dept: ELECTROPHYSIOLOGY | Facility: CLINIC | Age: 55
End: 2020-02-13

## 2020-02-19 ENCOUNTER — PATIENT MESSAGE (OUTPATIENT)
Dept: INTERNAL MEDICINE | Facility: CLINIC | Age: 55
End: 2020-02-19

## 2020-02-19 ENCOUNTER — PATIENT MESSAGE (OUTPATIENT)
Dept: TRANSPLANT | Facility: CLINIC | Age: 55
End: 2020-02-19

## 2020-02-20 ENCOUNTER — PATIENT MESSAGE (OUTPATIENT)
Dept: INTERNAL MEDICINE | Facility: CLINIC | Age: 55
End: 2020-02-20

## 2020-02-20 ENCOUNTER — PATIENT MESSAGE (OUTPATIENT)
Dept: ENDOCRINOLOGY | Facility: CLINIC | Age: 55
End: 2020-02-20

## 2020-02-20 DIAGNOSIS — F19.982 SUBSTANCE OR MEDICATION-INDUCED SLEEP DISORDER, INSOMNIA TYPE: Primary | ICD-10-CM

## 2020-02-20 RX ORDER — ZOLPIDEM TARTRATE 10 MG/1
10 TABLET ORAL NIGHTLY PRN
Qty: 7 TABLET | Refills: 0 | Status: SHIPPED | OUTPATIENT
Start: 2020-02-20 | End: 2020-03-19 | Stop reason: SDUPTHER

## 2020-02-20 NOTE — TELEPHONE ENCOUNTER
Called patient to inform that he needs to follow the sleep hygiene precautions very seriously. Patient reports taking the ambien pill 6.25 extended release and trying to go to bed at 7 and happens to watch television till 10.30pm before he finally actually falls asleep. Cannot continue sleeping straight for few hours instead keeps waking up every 2-3 hours. Patient had called to ask about increasing the dose of Ambien. Informed patient he needs to practice sleep hygiene. Also, informed the patient that I cannot increase the dose of extended release however gave patient the option of switching back to regular ambien but a dose higher than what he was taking before (10mg instead of 5mg) and see if that helps. Patient willing to switch to regular ambien citing extended slow release not working for him. Patient was explained that not to take double doses or both extended release and regular ambien together or anytime in the same evening/night. Advised patient to not drive after taking ambien since patient takes the pill at 7 pm which is quite early in the evening. Patient verbalizes understanding to everything. Patient will stop by at the clinic tomorrow to  his prescription. Advised to call back with any other questions.    Power Connors MD PGY-1  Internal Medicine

## 2020-02-20 NOTE — PROGRESS NOTES
Patient called and rescheduled 2/12 missed lab appointment to 2/21 since he has another appointment then

## 2020-02-21 ENCOUNTER — DOCUMENTATION ONLY (OUTPATIENT)
Dept: TRANSPLANT | Facility: CLINIC | Age: 55
End: 2020-02-21

## 2020-02-21 ENCOUNTER — OFFICE VISIT (OUTPATIENT)
Dept: ENDOCRINOLOGY | Facility: CLINIC | Age: 55
End: 2020-02-21
Payer: COMMERCIAL

## 2020-02-21 ENCOUNTER — LAB VISIT (OUTPATIENT)
Dept: LAB | Facility: HOSPITAL | Age: 55
End: 2020-02-21
Attending: INTERNAL MEDICINE
Payer: COMMERCIAL

## 2020-02-21 ENCOUNTER — TELEPHONE (OUTPATIENT)
Dept: ENDOCRINOLOGY | Facility: HOSPITAL | Age: 55
End: 2020-02-21

## 2020-02-21 ENCOUNTER — ANTI-COAG VISIT (OUTPATIENT)
Dept: CARDIOLOGY | Facility: CLINIC | Age: 55
End: 2020-02-21
Payer: COMMERCIAL

## 2020-02-21 ENCOUNTER — DOCUMENTATION ONLY (OUTPATIENT)
Dept: ELECTROPHYSIOLOGY | Facility: CLINIC | Age: 55
End: 2020-02-21

## 2020-02-21 VITALS — WEIGHT: 268.19 LBS | BODY MASS INDEX: 35.54 KG/M2 | HEART RATE: 80 BPM | HEIGHT: 73 IN | RESPIRATION RATE: 18 BRPM

## 2020-02-21 DIAGNOSIS — T46.2X5A AMIODARONE-INDUCED HYPERTHYROIDISM: ICD-10-CM

## 2020-02-21 DIAGNOSIS — T46.2X5A AMIODARONE-INDUCED HYPERTHYROIDISM: Primary | ICD-10-CM

## 2020-02-21 DIAGNOSIS — Z95.811 LVAD (LEFT VENTRICULAR ASSIST DEVICE) PRESENT: ICD-10-CM

## 2020-02-21 DIAGNOSIS — F17.211 NICOTINE DEPENDENCE, CIGARETTES, IN REMISSION: Primary | ICD-10-CM

## 2020-02-21 DIAGNOSIS — Z79.01 LONG TERM (CURRENT) USE OF ANTICOAGULANTS: ICD-10-CM

## 2020-02-21 DIAGNOSIS — E05.80 AMIODARONE-INDUCED HYPERTHYROIDISM: Primary | ICD-10-CM

## 2020-02-21 DIAGNOSIS — E05.80 AMIODARONE-INDUCED HYPERTHYROIDISM: ICD-10-CM

## 2020-02-21 DIAGNOSIS — R00.2 PALPITATIONS: ICD-10-CM

## 2020-02-21 DIAGNOSIS — I47.20 VT (VENTRICULAR TACHYCARDIA): ICD-10-CM

## 2020-02-21 DIAGNOSIS — I42.0 DCM (DILATED CARDIOMYOPATHY): ICD-10-CM

## 2020-02-21 LAB
INR PPP: 3.3 (ref 0.8–1.2)
PROTHROMBIN TIME: 31.3 SEC (ref 9–12.5)
T4 FREE SERPL-MCNC: 1 NG/DL (ref 0.71–1.51)
TSH SERPL DL<=0.005 MIU/L-ACNC: 1.51 UIU/ML (ref 0.4–4)

## 2020-02-21 PROCEDURE — 84443 ASSAY THYROID STIM HORMONE: CPT

## 2020-02-21 PROCEDURE — 93793 PR ANTICOAGULANT MGMT FOR PT TAKING WARFARIN: ICD-10-PCS | Mod: S$GLB,,,

## 2020-02-21 PROCEDURE — 85610 PROTHROMBIN TIME: CPT

## 2020-02-21 PROCEDURE — 99999 PR PBB SHADOW E&M-EST. PATIENT-LVL IV: ICD-10-PCS | Mod: PBBFAC,,, | Performed by: INTERNAL MEDICINE

## 2020-02-21 PROCEDURE — 93793 ANTICOAG MGMT PT WARFARIN: CPT | Mod: S$GLB,,,

## 2020-02-21 PROCEDURE — 36415 COLL VENOUS BLD VENIPUNCTURE: CPT

## 2020-02-21 PROCEDURE — 99999 PR PBB SHADOW E&M-EST. PATIENT-LVL IV: CPT | Mod: PBBFAC,,, | Performed by: INTERNAL MEDICINE

## 2020-02-21 PROCEDURE — 99214 PR OFFICE/OUTPT VISIT, EST, LEVL IV, 30-39 MIN: ICD-10-PCS | Mod: S$GLB,,, | Performed by: INTERNAL MEDICINE

## 2020-02-21 PROCEDURE — 99214 OFFICE O/P EST MOD 30 MIN: CPT | Mod: S$GLB,,, | Performed by: INTERNAL MEDICINE

## 2020-02-21 PROCEDURE — 84439 ASSAY OF FREE THYROXINE: CPT

## 2020-02-21 NOTE — ASSESSMENT & PLAN NOTE
Advised to keep a diary with date/time of symptoms so that it can be correlated with interrogation of his device. I doubt it's related to excess thyroid at this point as most recent TFTs suggest he is improving.

## 2020-02-21 NOTE — PROGRESS NOTES
Patient advised of dose instructions and verbalized understanding.  Patient rescheduled appointment from 2/24 to 2/27

## 2020-02-21 NOTE — PROGRESS NOTES
Subjective:      Chief Complaint: Referral for hyperthyroidism    HPI: Tim Richards is a 54 y.o. male who is here for follow-up evaluation for hyperthyroidism presumed 2/2 amiodarone.    Past Medical History:   Diagnosis Date    CHF (congestive heart failure)     Dilated cardiomyopathy 1/10/2018    Disorder of kidney and ureter     CKD    Gout     HTN (hypertension)     Ventricular tachycardia (paroxysmal)       Since last visit, he was hospitalized in mid-January after running out of prednisone and methimazole. Since then, he reports 100% compliance. MMI was stopped on 1/29/2020; Most recent TFTs done on 2/10 showed TSH increasing and FT4 almost to normal range. PDN was reduced to 40 mg daily at that time, which is the current dose he is taking. Over the past couple weeks, he's had intermittent palpitations while at work. They last a few seconds usually, sometimes longer then spontaneously stop. He denies any chest pain. He has gained about 14 lbs, which he attributes to steroid induced hunger. Otherwise, he has been feeling well in general.      Reviewed past medical, family, social history and updated as appropriate.    Review of Systems   Constitutional: Positive for fatigue. Negative for unexpected weight change.   Eyes: Negative for visual disturbance.   Respiratory: Negative for shortness of breath.    Cardiovascular: Negative for chest pain.   Gastrointestinal: Negative for abdominal pain.   Genitourinary: Negative for urgency.   Musculoskeletal: Negative for arthralgias.   Skin: Negative for wound.   Neurological: Negative for headaches.   Hematological: Does not bruise/bleed easily.   Psychiatric/Behavioral: Negative for sleep disturbance.     Objective:     Vitals:    02/21/20 0933   Pulse: 80   Resp: 18     BP Readings from Last 5 Encounters:   01/29/20 (!) 82/0   01/20/20 95/75   12/05/19 (!) 95/0   10/28/19 (!) 88/0   10/25/19 (!) 100/0       Physical Exam   Constitutional: He is oriented  to person, place, and time. He appears well-developed.   HENT:   Right Ear: External ear normal.   Left Ear: External ear normal.   Nose: Nose normal.   Hearing grossly normal  Dentition grossly normal   Neck: No tracheal deviation present. No thyromegaly (no tenderness) present.   Cardiovascular:   LVAD hum audible throughout precordium and over abdomen   Pulmonary/Chest: Effort normal and breath sounds normal.   Abdominal: Soft. He exhibits no mass. There is no tenderness.   Musculoskeletal: He exhibits no edema.   No digital clubbing or extremity cyanosis   Neurological: He is alert and oriented to person, place, and time. Coordination normal.   No tremor   Skin: No rash noted.   No subcutaneous nodules noted.   Psychiatric: He has a normal mood and affect. His behavior is normal.   Alert and oriented to person, place, and situation.   Nursing note and vitals reviewed.      Wt Readings from Last 10 Encounters:   02/21/20 0933 121.6 kg (268 lb 3 oz)   02/07/20 1323 116.6 kg (257 lb)   02/05/20 1623 114.8 kg (253 lb)   01/29/20 1130 115.7 kg (255 lb)   01/20/20 0700 116.4 kg (256 lb 9.9 oz)   01/18/20 0710 116.7 kg (257 lb 4.4 oz)   01/17/20 0500 116.4 kg (256 lb 9.9 oz)   01/16/20 1100 115.8 kg (255 lb 2.9 oz)   01/14/20 0400 119.3 kg (263 lb 0.1 oz)   01/13/20 0500 116.1 kg (256 lb 0.7 oz)   01/12/20 0400 121.5 kg (267 lb 13.7 oz)   01/11/20 2246 124.7 kg (274 lb 14.6 oz)   12/31/19 1038 124.7 kg (274 lb 14.6 oz)   12/05/19 1400 120.7 kg (266 lb)   12/05/19 1506 120.2 kg (265 lb)   11/08/19 1618 118.8 kg (262 lb)   11/08/19 1403 118.8 kg (261 lb 14.5 oz)       Lab Results   Component Value Date    HGBA1C 5.5 03/08/2018    HGBA1C 5.4 01/23/2018    HGBA1C 5.6 08/04/2016    HGBA1C 5.8 09/11/2015     Lab Results   Component Value Date    CHOL 287 (H) 02/07/2020    CHOL 150 11/07/2018    HDL >140 (H) 02/07/2020    HDL 85 (H) 11/07/2018    LDLCALC Unable to calculate 02/07/2020    LDLCALC 53.0 (L) 11/07/2018    TRIG 56  02/07/2020    TRIG 60 11/07/2018    CHOLHDL Unable to calculate 02/07/2020    CHOLHDL 56.7 (H) 11/07/2018     Lab Results   Component Value Date     02/10/2020    K 4.3 02/10/2020    CL 98 (L) 02/10/2020    CO2 24 02/10/2020    GLU 98 02/10/2020    BUN 35 (H) 02/10/2020    CREATININE 2.5 (H) 02/10/2020    CALCIUM 9.8 02/10/2020    PROT 7.6 02/10/2020    ALBUMIN 4.4 02/10/2020    BILITOT 0.6 02/10/2020    ALKPHOS 75 02/10/2020    AST 31 02/10/2020    ALT 31 02/10/2020    ANIONGAP 15 02/10/2020    ESTGFRAFRICA 32.7 (A) 02/10/2020    EGFRNONAA 28.3 (A) 02/10/2020    TSH 0.46 02/10/2020      No results found for: MICALBCREAT    Assessment/Plan:     DCM (dilated cardiomyopathy)  Avoid thyrotoxicosis, which would increase risk for arrhythmias.    Palpitations  Advised to keep a diary with date/time of symptoms so that it can be correlated with interrogation of his device. I doubt it's related to excess thyroid at this point as most recent TFTs suggest he is improving.    Amiodarone-induced hyperthyroidism  Check labs today. Expect he should be on the tail end of the type 2 AIT, so we will start to wean prednisone based on trend of TFTs. Will need to monitor for hypothyroidism on the back end as well.    Check labs q2 weeks; will coordinate with INR checks.      RTC in 6 weeks.

## 2020-02-21 NOTE — PROGRESS NOTES
"Pt presented to scheduling window asking to speak with preht coordinator.  Pt reports that "Dr Connors thinks I'm ready to talk about transplant again".   Advised pt that his case needs to be discussed as a group to ensure all team members are in agreement before OHT eval will be re-opened.   Pt reports he comes to LVAD clinic next week and hopes to discuss it more at that time.       Prior discussion with LVAD team indicates ongoing concern for pt's transplant candidacy due to compliance.    Pt previously was smoking but reports he has "strickly stopped".  Per Dr Connors, nicotine level to be checked at vad clinic.   Order entered and linked to appt.   "

## 2020-02-21 NOTE — TELEPHONE ENCOUNTER
Please let the patient know his thyroid labs are now normal. I want him to decrease prednisone to 20 mg once per day (one tablet) and I will schedule another set of blood work on 3/2

## 2020-02-21 NOTE — PROGRESS NOTES
Arrhythmia Department  [employer]    Patient seen in device clinic to disable RRT alert on device.   RRT alert turned OFF.    Patient scheduled for generator change procedure on 3/9/2020.

## 2020-02-24 ENCOUNTER — TELEPHONE (OUTPATIENT)
Dept: CARDIOLOGY | Facility: HOSPITAL | Age: 55
End: 2020-02-24

## 2020-02-24 ENCOUNTER — TELEPHONE (OUTPATIENT)
Dept: ENDOCRINOLOGY | Facility: CLINIC | Age: 55
End: 2020-02-24

## 2020-02-24 NOTE — TELEPHONE ENCOUNTER
Contacted the pt on this am to inform him he does not need to come to the [facility] on 2/27/20 as to the Battery Alert on his ICD was reprogrammed to OFF on 2/21/20.  Understanding was verbalized.  Pt appreciated the call.

## 2020-02-24 NOTE — TELEPHONE ENCOUNTER
Spoke with Mr Dumont regarding lab results and decrease medications. Patient understands and will f/u with lab

## 2020-02-25 NOTE — PROGRESS NOTES
"Outpatient Speech Language Pathology   Peds Speech Language Treatment Note       Patient Name: Sarmad oLpes  : 2014  MRN: [id number]  Today's Date: 2020      Visit Date: 2020    There is no problem list on file for this patient.      Visit Dx:    ICD-10-CM ICD-9-CM   1. Speech/language delay F80.9 315.39                       OP SLP Assessment/Plan - 20 1400        SLP Assessment    Functional Problems  Speech Language- Peds   -KG (r) KB (t) KG (c)    Clinical Impression Comments  Sarmad correctly categorized ocean animals, along with other items. He was able to produce \"his\" and \"hers\" when given a model.   -KG (r) KB (t) KG (c)       SLP Plan    Plan Comments  Continue therapy.   -KG (r) KB (t) KG (c)      User Key  (r) = Recorded By, (t) = Taken By, (c) = Cosigned By    Initials Name Provider Type    Debbie Heredia, CCC-SLP Speech and Language Pathologist    Jazmin Le, Speech Therapy Student Speech Therapy Student          SLP OP Goals     Row Name 20 1418          Goal Type Needed    Goal Type Needed  Pediatric Goals  -KG (r) KB (t) KG (c)        Subjective Comments    Subjective Comments  Sarmad was alert and ready to participate in therapy.  -KG (r) KB (t) KG (c)        Short-Term Goals    STG- 1  --  -KG (r) KB (t) KG (c)     Status: STG- 1  --  -KG (r) KB (t) KG (c)     Comments: STG- 1  --  -KG (r) KB (t) KG (c)     STG- 2  --  -KG (r) KB (t) KG (c)     Status: STG- 2  --  -KG (r) KB (t) KG (c)     Comments: STG- 2  --  -KG (r) KB (t) KG (c)     STG- 3  Child will use/understand possessive pronouns his and hers with 80% accuracy.  -KG (r) KB (t) KG (c)     Status: STG- 3  Progressing as expected  -KG (r) KB (t) KG (c)     Comments: STG- 3  Sarmad demonstrated understanding of his/her(s) by identifying what items belonged to the man or woman. He did have mild difficulty using it independently. SLP modeled for correct production of his and hers.   -KG (r) KB (t) " Patient wife  rescheduled appointment from 11/12 to 11/14     KG (c)     STG- 4  Child will understand categories by placing items in correct categories with 80% accuracy.  -KG (r) KB (t) KG (c)     Status: STG- 4  Progressing as expected  -KG (r) KB (t) KG (c)     Comments: STG- 4  Sarmad placed ocean animals and objects into categories based on if they belonged in the ocean. He had mild difficulty and needed verbal cues. He also identified objects between the four categories of food, clothes, transportation, and farm animals. During this activity, Sarmad had no difficulty.   -KG (r) KB (t) KG (c)     STG- 5  --  -KG (r) KB (t) KG (c)     Status: STG- 5  --  -KG (r) KB (t) KG (c)     Comments: STG- 5  --  -KG (r) KB (t) KG (c)     STG- 6  --  -KG (r) KB (t) KG (c)     Status: STG- 6  --  -KG (r) KB (t) KG (c)     Comments: STG- 6  --  -KG (r) KB (t) KG (c)     STG- 7  --  -KG (r) KB (t) KG (c)     Status: STG- 7  --  -KG (r) KB (t) KG (c)     Comments: STG- 7  --  -KG (r) KB (t) KG (c)     STG- 8  --  -KG (r) KB (t) KG (c)     Status: STG- 8  --  -KG (r) KB (t) KG (c)     Comments: STG- 8  --  -KG (r) KB (t) KG (c)     STG- 9  --  -KG (r) KB (t) KG (c)     Status: STG- 9  --  -KG (r) KB (t) KG (c)     Comments: STG- 9  --  -KG (r) KB (t) KG (c)     STG- 10  --  -KG (r) KB (t) KG (c)     Status: STG- 10  --  -KG (r) KB (t) KG (c)     Comments: STG- 10  --  -KG (r) KB (t) KG (c)        Long-Term Goals    LTG- 1  The parent will agree to participate in home stimulation program as outlined by SLP.   -KG (r) KB (t) KG (c)     Status: LTG- 1  Progressing as expected  -KG (r) KB (t) KG (c)     Comments: LTG- 1  SLP reviewed goals and progress with mom after therapy.  -KG (r) KB (t) KG (c)        SLP Time Calculation    SLP Goal Re-Cert Due Date  03/30/20  -KG (r) KB (t) KG (c)       User Key  (r) = Recorded By, (t) = Taken By, (c) = Cosigned By    Initials Name Provider Type    Debbie Heredia, CCC-SLP Speech and Language Pathologist    Jazmin Le, Speech Therapy Student  Speech Therapy Student          OP SLP Education     Row Name 02/25/20 1400       Education    Barriers to Learning  No barriers identified  -KG (r) KB (t) KG (c)      User Key  (r) = Recorded By, (t) = Taken By, (c) = Cosigned By    Initials Name Effective Dates    Debbie Heredia CCC-SLP 02/11/20 -     Jazmin Le, Speech Therapy Student 12/17/19 -              Time Calculation:                       Debbie Yoon CCC-SLP  2/25/2020

## 2020-02-26 ENCOUNTER — TELEPHONE (OUTPATIENT)
Dept: ENDOCRINOLOGY | Facility: CLINIC | Age: 55
End: 2020-02-26

## 2020-02-27 ENCOUNTER — ANTI-COAG VISIT (OUTPATIENT)
Dept: CARDIOLOGY | Facility: CLINIC | Age: 55
End: 2020-02-27
Payer: COMMERCIAL

## 2020-02-27 ENCOUNTER — CLINICAL SUPPORT (OUTPATIENT)
Dept: TRANSPLANT | Facility: CLINIC | Age: 55
End: 2020-02-27
Payer: COMMERCIAL

## 2020-02-27 ENCOUNTER — OFFICE VISIT (OUTPATIENT)
Dept: TRANSPLANT | Facility: CLINIC | Age: 55
End: 2020-02-27
Payer: COMMERCIAL

## 2020-02-27 ENCOUNTER — TELEPHONE (OUTPATIENT)
Dept: ENDOCRINOLOGY | Facility: CLINIC | Age: 55
End: 2020-02-27

## 2020-02-27 ENCOUNTER — DOCUMENTATION ONLY (OUTPATIENT)
Dept: TRANSPLANT | Facility: CLINIC | Age: 55
End: 2020-02-27

## 2020-02-27 ENCOUNTER — PATIENT MESSAGE (OUTPATIENT)
Dept: ENDOCRINOLOGY | Facility: CLINIC | Age: 55
End: 2020-02-27

## 2020-02-27 ENCOUNTER — HOSPITAL ENCOUNTER (OUTPATIENT)
Dept: PULMONOLOGY | Facility: CLINIC | Age: 55
Discharge: HOME | End: 2020-02-27
Payer: COMMERCIAL

## 2020-02-27 ENCOUNTER — LAB VISIT (OUTPATIENT)
Dept: LAB | Facility: HOSPITAL | Age: 55
End: 2020-02-27
Payer: COMMERCIAL

## 2020-02-27 VITALS
BODY MASS INDEX: 34.85 KG/M2 | BODY MASS INDEX: 35.39 KG/M2 | WEIGHT: 267 LBS | HEART RATE: 84 BPM | SYSTOLIC BLOOD PRESSURE: 80 MMHG | HEIGHT: 73 IN | HEIGHT: 73 IN | WEIGHT: 263 LBS

## 2020-02-27 DIAGNOSIS — I42.8 NICM (NONISCHEMIC CARDIOMYOPATHY): Chronic | ICD-10-CM

## 2020-02-27 DIAGNOSIS — I50.42 CHRONIC COMBINED SYSTOLIC AND DIASTOLIC HEART FAILURE: ICD-10-CM

## 2020-02-27 DIAGNOSIS — T46.2X5A AMIODARONE-INDUCED HYPERTHYROIDISM: Primary | ICD-10-CM

## 2020-02-27 DIAGNOSIS — Z79.01 LONG TERM (CURRENT) USE OF ANTICOAGULANTS: ICD-10-CM

## 2020-02-27 DIAGNOSIS — E05.90 HYPERTHYROIDISM: ICD-10-CM

## 2020-02-27 DIAGNOSIS — I13.0 HYPERTENSIVE CARDIOVASCULAR-RENAL DISEASE, STAGE 1-4 OR UNSPECIFIED CHRONIC KIDNEY DISEASE, WITH HEART FAILURE: ICD-10-CM

## 2020-02-27 DIAGNOSIS — I50.9 HEART FAILURE: ICD-10-CM

## 2020-02-27 DIAGNOSIS — Z95.811 HEART REPLACED BY HEART ASSIST DEVICE: Primary | ICD-10-CM

## 2020-02-27 DIAGNOSIS — Z95.811 LVAD (LEFT VENTRICULAR ASSIST DEVICE) PRESENT: ICD-10-CM

## 2020-02-27 DIAGNOSIS — N18.30 CKD (CHRONIC KIDNEY DISEASE), STAGE III: ICD-10-CM

## 2020-02-27 DIAGNOSIS — Z95.811 HEART REPLACED BY HEART ASSIST DEVICE: ICD-10-CM

## 2020-02-27 DIAGNOSIS — E05.80 AMIODARONE-INDUCED HYPERTHYROIDISM: Primary | ICD-10-CM

## 2020-02-27 LAB
ALBUMIN SERPL BCP-MCNC: 3.5 G/DL (ref 3.5–5.2)
ALP SERPL-CCNC: 87 U/L (ref 55–135)
ALT SERPL W/O P-5'-P-CCNC: 37 U/L (ref 10–44)
ANION GAP SERPL CALC-SCNC: 9 MMOL/L (ref 8–16)
AST SERPL-CCNC: 30 U/L (ref 10–40)
BILIRUB DIRECT SERPL-MCNC: 0.3 MG/DL (ref 0.1–0.3)
BILIRUB SERPL-MCNC: 0.5 MG/DL (ref 0.1–1)
BNP SERPL-MCNC: 510 PG/ML (ref 0–99)
BUN SERPL-MCNC: 21 MG/DL (ref 6–20)
CALCIUM SERPL-MCNC: 9.3 MG/DL (ref 8.7–10.5)
CHLORIDE SERPL-SCNC: 100 MMOL/L (ref 95–110)
CO2 SERPL-SCNC: 29 MMOL/L (ref 23–29)
CREAT SERPL-MCNC: 1.9 MG/DL (ref 0.5–1.4)
CRP SERPL-MCNC: 70.5 MG/L (ref 0–8.2)
EST. GFR  (AFRICAN AMERICAN): 45.5 ML/MIN/1.73 M^2
EST. GFR  (NON AFRICAN AMERICAN): 39.4 ML/MIN/1.73 M^2
GLUCOSE SERPL-MCNC: 79 MG/DL (ref 70–110)
LDH SERPL L TO P-CCNC: 503 U/L (ref 110–260)
MAGNESIUM SERPL-MCNC: 2.1 MG/DL (ref 1.6–2.6)
PHOSPHATE SERPL-MCNC: 2.5 MG/DL (ref 2.7–4.5)
POTASSIUM SERPL-SCNC: 3.7 MMOL/L (ref 3.5–5.1)
PREALB SERPL-MCNC: 31 MG/DL (ref 20–43)
PROT SERPL-MCNC: 6.8 G/DL (ref 6–8.4)
SODIUM SERPL-SCNC: 138 MMOL/L (ref 136–145)

## 2020-02-27 PROCEDURE — 80053 COMPREHEN METABOLIC PANEL: CPT

## 2020-02-27 PROCEDURE — 93750 OP LVAD INTERROGATION: ICD-10-PCS | Mod: S$GLB,,, | Performed by: INTERNAL MEDICINE

## 2020-02-27 PROCEDURE — 84100 ASSAY OF PHOSPHORUS: CPT

## 2020-02-27 PROCEDURE — 99214 OFFICE O/P EST MOD 30 MIN: CPT | Mod: S$GLB,,, | Performed by: INTERNAL MEDICINE

## 2020-02-27 PROCEDURE — 36415 COLL VENOUS BLD VENIPUNCTURE: CPT

## 2020-02-27 PROCEDURE — 93793 ANTICOAG MGMT PT WARFARIN: CPT | Mod: S$GLB,,,

## 2020-02-27 PROCEDURE — 99999 PR PBB SHADOW E&M-EST. PATIENT-LVL III: ICD-10-PCS | Mod: PBBFAC,,, | Performed by: INTERNAL MEDICINE

## 2020-02-27 PROCEDURE — 84134 ASSAY OF PREALBUMIN: CPT

## 2020-02-27 PROCEDURE — 82248 BILIRUBIN DIRECT: CPT

## 2020-02-27 PROCEDURE — 83880 ASSAY OF NATRIURETIC PEPTIDE: CPT

## 2020-02-27 PROCEDURE — 93793 PR ANTICOAGULANT MGMT FOR PT TAKING WARFARIN: ICD-10-PCS | Mod: S$GLB,,,

## 2020-02-27 PROCEDURE — 94618 PULMONARY STRESS TESTING: CPT | Mod: S$GLB,,, | Performed by: INTERNAL MEDICINE

## 2020-02-27 PROCEDURE — 86140 C-REACTIVE PROTEIN: CPT

## 2020-02-27 PROCEDURE — 83735 ASSAY OF MAGNESIUM: CPT

## 2020-02-27 PROCEDURE — 93750 INTERROGATION VAD IN PERSON: CPT | Mod: S$GLB,,, | Performed by: INTERNAL MEDICINE

## 2020-02-27 PROCEDURE — 94618 PULMONARY STRESS TESTING: ICD-10-PCS | Mod: S$GLB,,, | Performed by: INTERNAL MEDICINE

## 2020-02-27 PROCEDURE — 83615 LACTATE (LD) (LDH) ENZYME: CPT

## 2020-02-27 PROCEDURE — 99214 PR OFFICE/OUTPT VISIT, EST, LEVL IV, 30-39 MIN: ICD-10-PCS | Mod: S$GLB,,, | Performed by: INTERNAL MEDICINE

## 2020-02-27 PROCEDURE — 99999 PR PBB SHADOW E&M-EST. PATIENT-LVL III: CPT | Mod: PBBFAC,,, | Performed by: INTERNAL MEDICINE

## 2020-02-27 NOTE — PROGRESS NOTES
Date of Implant with Heartmate II LVAD: 3/8/2018    PATIENT ARRIVED IN CLINIC:  Ambulatory   Accompanied by: self    Vitals  Temperature, oral:   Temp Readings from Last 1 Encounters:   20 97.4 °F (36.3 °C) (Oral)     Blood Pressure:   BP Readings from Last 3 Encounters:   20 (!) 80/0   20 (!) 82/0   20 95/75        VAD Interrogation:  TXP SACHI INTERROGATIONS 2020   Type HeartMate II HeartMate II -   Flow 6.2 5 -   Speed 52401 37679 -   PI 4.0 3.9 -   Power (Jackson) 7.2 6.4 -   LSL 9600 9600 -   Pulsatility Intermittent pulse - Pulse       History Lo.log  Problems / Issues / Alarms with VAD if any: None noted  VAD Sounds: HM2 Smooth      HCT:   Lab Results   Component Value Date    HCT 41.7 2020    HCT 26 (L) 03/10/2018       Complaints/reason for visit today: routine  Emergency Equipment With Patient: yes   VAD Binder With Patient: no   Reviewed VAD Numbers In Binder: no    Any Equipment Issues: None noted (Refer to  note for complete details)    DLES Assessment:  Appearance Of Driveline: 1  Antibiotics: NO  Velour: no  Manual & Visual Inspection Of Driveline: No kinks or tears noted  Stabilization Device In Use: yes, sun securement device      Assessment:   PAIN: YES Pain Ratin, Location of Pain: heart/chest, Description of Pain:  palpitations, Pain Medication/How Often: none  Complaints Of Nausea / Vomiting: None noted    Appearance and Frequency Of Stools: normal and formed without blood & daily  Color Of Urine: clear/yellow  Coping/Depression/Anxiety: coping okay  Sleep Habits: 7 hrs /night  Sleep Aids: ambien  Showering: Yes, reminded to change dressing immediately after drying off  Activity/Exercise: walking 1-2 hours daily, working FT   Driving: Yes. Reminded to pull over should there be an alarm before looking down at controller.    DLES Dressing Care:   Frequency of Dressing Changes: every other day & daily kit  Pt  In Need Of Management Kits?:yes -   1 Box of daily kit  It is medically necessary to have VAD management kits in order to prevent infection or to assist in the healing of an infected DLES.    Labs:    Chemistry        Component Value Date/Time     02/27/2020 1308    K 3.7 02/27/2020 1308     (L) 02/27/2020 1308    CO2 27 02/27/2020 1308    BUN 24 (H) 02/27/2020 1308    CREATININE 1.8 (H) 02/27/2020 1308     02/27/2020 1308        Component Value Date/Time    CALCIUM 8.9 02/27/2020 1308    ALKPHOS 75 02/10/2020 1224    AST 31 02/10/2020 1224    ALT 31 02/10/2020 1224    BILITOT 0.6 02/10/2020 1224    ESTGFRAFRICA 48.6 (A) 02/27/2020 1308    EGFRNONAA 42.0 (A) 02/27/2020 1308            Magnesium   Date Value Ref Range Status   02/10/2020 2.2 1.6 - 2.6 mg/dL Final       Lab Results   Component Value Date    WBC 8.70 02/27/2020    HGB 13.7 (L) 02/27/2020    HCT 41.7 02/27/2020    MCV 82 02/27/2020     02/27/2020       Lab Results   Component Value Date    INR 1.9 (H) 02/27/2020    INR 3.3 (H) 02/21/2020    INR 1.8 (H) 02/10/2020       BNP   Date Value Ref Range Status   02/10/2020 371 (H) 0 - 99 pg/mL Final     Comment:     Values of less than 100 pg/ml are consistent with non-CHF populations.   02/07/2020 99 0 - 99 pg/mL Final     Comment:     Values of less than 100 pg/ml are consistent with non-CHF populations.   01/31/2020 624 (H) 0 - 99 pg/mL Final     Comment:     Values of less than 100 pg/ml are consistent with non-CHF populations.       LD   Date Value Ref Range Status   02/10/2020 363 (H) 84 - 195 U/L Final     Comment:     Results are increased in hemolyzed samples.   02/07/2020 513 (H) 110 - 260 U/L Final     Comment:     Results are increased in hemolyzed samples.   01/29/2020 447 (H) 110 - 260 U/L Final     Comment:     Results are increased in hemolyzed samples.       Labs reviewed with patient: YES      Patient Satisfaction Survey completed per patient: No  (explained about  "signature and box to check)  Medication reconciliation: per MA.  Medication Detail updated today: no  Coumadin Managed by: [facility]    Education: Reviewed driveline care, emergency procedures, how to change the controller, alarms with patient, as well as discussed how to page the VAD coordinator in case of an emergency.     Plans/Needs: Routine f/u. Pt c/o palpitations and always feeling anxious. Also reports that he "always feels wound up on the inside." Steroids are being titrated by Endocrine. LVAD speed decreased to 9800 with LSL 9400. Pt to RTC in 1 month with echo.    Hurricane Season: No        "

## 2020-02-27 NOTE — PROGRESS NOTES
Pt presented for 2 year follow-up and verbalized consent and willingness to continue to participate with the INTERM[facility] registry.      Patient completed the EQ-5D quality of life questionnaire, KCCQ, and the Trail Making neurocognitive test in 45 seconds.

## 2020-02-27 NOTE — LETTER
March 9, 2020        Nader Chiu  [address]  [address]  [place] [address]  Phone: [phone]  Fax: [phone]             [facility] [address] [place] [address]  Phone: [phone]   Patient: Tim Richards   MR Number: [id number]   YOB: 1965   Date of Visit: 2/27/2020       Dear Dr. Nader Chiu    Thank you for referring Tim Richards to me for evaluation. Attached you will find relevant portions of my assessment and plan of care.    If you have questions, please do not hesitate to call me. I look forward to following Tim Richards along with you.    Sincerely,    Amy Rhodes MD    Enclosure    If you would like to receive this communication electronically, please contact [email or web address] or [phone] to request [employer] access.    [employer] is a tool which provides read-only access to select patient information with whom you have a relationship. Its easy to use and provides real time access to review your patients record including encounter summaries, notes, results, and demographic information.    If you feel you have received this communication in error or would no longer like to receive these types of communications, please e-mail [email or web address]

## 2020-02-28 ENCOUNTER — TELEPHONE (OUTPATIENT)
Dept: TRANSPLANT | Facility: CLINIC | Age: 55
End: 2020-02-28

## 2020-02-28 NOTE — TELEPHONE ENCOUNTER
Pt's wife called after reviewing labs on [facility]. CRP is elevated at 70. H/H just down a little. Wife is concerned that pt's GI ulcer could be coming back. Pt denies BRBPR and/or tarry stools. No abdominal pain.    Plan for repeat labs on Monday. Dr. Rhodes aware of lab results.

## 2020-02-28 NOTE — PROCEDURES
Tim Richards is a 54 y.o.  male patient, who presents for a 6 minute walk test ordered by MD Shae.  The diagnosis is (LVAD); Cardiomyopathy.  The patient's BMI is 34.7 kg/m2.  Predicted distance (lower limit of normal) is 424.51 meters.      Test Results:    The test was completed with stops.  The patient stopped 1 time for a total of 14 seconds.  The total time walked was 346 seconds.  During walking, the patient reported:  Dyspnea, Lightheadedness.  The patient used no assistive devices during testing.     02/27/2020---------Distance: 304.8 meters (1000 feet)     O2 Sat % Supplemental Oxygen Heart Rate Blood Pressure Renan Scale   Pre-exercise  (Resting) 97 % Room Air 89 bpm 102/72 mmHg 3   During Exercise 93 % Room Air 99 bpm Unable to obtain 7-8   Post-exercise  (Recovery) 96 % Room Air 87 bpm       Recovery Time:  128 seconds    The patient completed the initial 15 feet in 4.30 seconds.    Performing nurse/tech: Bayron CRT      PREVIOUS STUDY:   12/05/2019---------Distance: 388.62 meters (1275 feet)       O2 Sat % Supplemental Oxygen Heart Rate Blood Pressure Renan Scale   Pre-exercise  (Resting) 97 % Room Air 89 bpm Unable to obtain 2   During Exercise   98 % Room Air 119 bpm Unable to obtain 7-8   Post-exercise  (Recovery) 97 % Room Air  86 bpm          CLINICAL INTERPRETATION:  Six minute walk distance is 304.8 meters (1000 feet) with very heavy dyspnea.  During exercise, there was desaturation while breathing room air.  Heart rate remained stable with walking.  The patient reported non-pulmonary symptoms during exercise.  Since the previous study in December 2019, exercise capacity is significantly worse.  Based upon age and body mass index, exercise capacity is less than predicted.

## 2020-03-02 ENCOUNTER — PATIENT MESSAGE (OUTPATIENT)
Dept: TRANSPLANT | Facility: CLINIC | Age: 55
End: 2020-03-02

## 2020-03-02 ENCOUNTER — TELEPHONE (OUTPATIENT)
Dept: ENDOCRINOLOGY | Facility: CLINIC | Age: 55
End: 2020-03-02

## 2020-03-02 ENCOUNTER — PATIENT MESSAGE (OUTPATIENT)
Dept: ENDOCRINOLOGY | Facility: CLINIC | Age: 55
End: 2020-03-02

## 2020-03-02 DIAGNOSIS — E05.80 AMIODARONE-INDUCED HYPERTHYROIDISM: ICD-10-CM

## 2020-03-02 DIAGNOSIS — T46.2X5A AMIODARONE-INDUCED HYPERTHYROIDISM: ICD-10-CM

## 2020-03-02 RX ORDER — METHIMAZOLE 10 MG/1
20 TABLET ORAL 2 TIMES DAILY
Qty: 120 TABLET | Refills: 2 | Status: SHIPPED | OUTPATIENT
Start: 2020-03-02 | End: 2020-04-16 | Stop reason: SDUPTHER

## 2020-03-02 RX ORDER — PREDNISONE 20 MG/1
60 TABLET ORAL DAILY
Qty: 90 TABLET | Refills: 2 | Status: SHIPPED | OUTPATIENT
Start: 2020-03-02 | End: 2020-04-16 | Stop reason: SDUPTHER

## 2020-03-02 NOTE — TELEPHONE ENCOUNTER
I spoke with Kelly (Mr. Richards is we have) about the increasing free T4 level.  She says he has been taking the prednisone at 40 mg as directed.  He has been having increasing palpitations recently.  She has had a message to his cardiologist about possibly reducing the dose of amiodarone from 400>200 mg and is waiting to hear back from them.    Plan:  1.  Increased prednisone back up to 60 mg once per day.  2. Resume methimazole at 20 mg twice per day  3. Recheck TFTs in 3 days.    She voiced understanding of the plan.  She will measure he gets the 1st dose of methimazole tonight along with an extra 20 mg of prednisone and start the above regimen tomorrow.

## 2020-03-03 ENCOUNTER — ANTI-COAG VISIT (OUTPATIENT)
Dept: CARDIOLOGY | Facility: CLINIC | Age: 55
End: 2020-03-03
Payer: COMMERCIAL

## 2020-03-03 DIAGNOSIS — Z79.01 LONG TERM (CURRENT) USE OF ANTICOAGULANTS: ICD-10-CM

## 2020-03-03 DIAGNOSIS — Z95.811 LVAD (LEFT VENTRICULAR ASSIST DEVICE) PRESENT: ICD-10-CM

## 2020-03-03 DIAGNOSIS — Z95.811 HEART REPLACED BY HEART ASSIST DEVICE: Primary | ICD-10-CM

## 2020-03-03 PROCEDURE — 93793 PR ANTICOAGULANT MGMT FOR PT TAKING WARFARIN: ICD-10-PCS | Mod: S$GLB,,,

## 2020-03-03 PROCEDURE — 93793 ANTICOAG MGMT PT WARFARIN: CPT | Mod: S$GLB,,,

## 2020-03-03 NOTE — PROGRESS NOTES
Patient wife confirmed dose patient held last night dose due to vad  instructions . Also started back on two meds yesterday Prednisone and methIMAzole (TAPAZOLE) 10 MG .

## 2020-03-04 ENCOUNTER — PATIENT MESSAGE (OUTPATIENT)
Dept: INTERNAL MEDICINE | Facility: CLINIC | Age: 55
End: 2020-03-04

## 2020-03-05 ENCOUNTER — TELEPHONE (OUTPATIENT)
Dept: ELECTROPHYSIOLOGY | Facility: CLINIC | Age: 55
End: 2020-03-05

## 2020-03-05 NOTE — TELEPHONE ENCOUNTER
Spoke to Mr. Richards    CONFIRMED procedure arrival time of 8am  Reiterated instructions including:  -Directions to check in desk  -NPO after midnight night prior to procedure   -Confirmed compliance of coumadin  -Pre-procedure LABS 3/2. Labs received  -Confirmed no recent fever, bleeding, infection or skin rash in the past 30 days  -Bathe night prior and morning prior to procedure with Hibiclens solution or an antibacterial soap     Pt verbalizes understanding of above and appreciates call.

## 2020-03-06 ENCOUNTER — TELEPHONE (OUTPATIENT)
Dept: ELECTROPHYSIOLOGY | Facility: CLINIC | Age: 55
End: 2020-03-06

## 2020-03-06 ENCOUNTER — PATIENT MESSAGE (OUTPATIENT)
Dept: ENDOCRINOLOGY | Facility: CLINIC | Age: 55
End: 2020-03-06

## 2020-03-06 ENCOUNTER — ANTI-COAG VISIT (OUTPATIENT)
Dept: CARDIOLOGY | Facility: CLINIC | Age: 55
End: 2020-03-06
Payer: COMMERCIAL

## 2020-03-06 ENCOUNTER — TELEPHONE (OUTPATIENT)
Dept: ENDOCRINOLOGY | Facility: CLINIC | Age: 55
End: 2020-03-06

## 2020-03-06 ENCOUNTER — LAB VISIT (OUTPATIENT)
Dept: LAB | Facility: HOSPITAL | Age: 55
End: 2020-03-06
Attending: INTERNAL MEDICINE
Payer: COMMERCIAL

## 2020-03-06 DIAGNOSIS — T46.2X5A AMIODARONE-INDUCED HYPERTHYROIDISM: Primary | ICD-10-CM

## 2020-03-06 DIAGNOSIS — Z95.811 LVAD (LEFT VENTRICULAR ASSIST DEVICE) PRESENT: ICD-10-CM

## 2020-03-06 DIAGNOSIS — E05.80 AMIODARONE-INDUCED HYPERTHYROIDISM: ICD-10-CM

## 2020-03-06 DIAGNOSIS — T46.2X5A AMIODARONE-INDUCED HYPERTHYROIDISM: ICD-10-CM

## 2020-03-06 DIAGNOSIS — E05.80 AMIODARONE-INDUCED HYPERTHYROIDISM: Primary | ICD-10-CM

## 2020-03-06 LAB
INR PPP: 3.3 (ref 0.8–1.2)
PROTHROMBIN TIME: 36.5 SEC (ref 9–12.5)
T4 FREE SERPL-MCNC: 1.82 NG/DL (ref 0.71–1.51)
TSH SERPL DL<=0.005 MIU/L-ACNC: 0.02 UIU/ML (ref 0.4–4)

## 2020-03-06 PROCEDURE — 85610 PROTHROMBIN TIME: CPT

## 2020-03-06 PROCEDURE — 84439 ASSAY OF FREE THYROXINE: CPT

## 2020-03-06 PROCEDURE — 93793 ANTICOAG MGMT PT WARFARIN: CPT | Mod: S$GLB,,,

## 2020-03-06 PROCEDURE — 84443 ASSAY THYROID STIM HORMONE: CPT

## 2020-03-06 PROCEDURE — 93793 PR ANTICOAGULANT MGMT FOR PT TAKING WARFARIN: ICD-10-PCS | Mod: S$GLB,,,

## 2020-03-06 PROCEDURE — 36415 COLL VENOUS BLD VENIPUNCTURE: CPT

## 2020-03-06 NOTE — TELEPHONE ENCOUNTER
Spoke with Leydi in billing. Notified her that I received a message from phone team stating that the pt was attempting to contact billing department to make a payment. She said that she will call the pt. I returned a phone call to Mr. Richards.  He states that he attempted to pay his bill over the phone and was told that they will not accept payment over the phone. I explained that I contacted the finance department and asked them to call him. I also explained that his procedure is still scheduled and payment can be made when he arrives. Pt verbalized understanding and appreciates the call.

## 2020-03-06 NOTE — TELEPHONE ENCOUNTER
"Returned call to Financial Clearance Call. Pt required to pay $300 co-pay for procedure on March 9th and that the pt is not financially cleared for his appt. I spoke with Mr. Richards and notified him of the financial requirement. Family very concerned that procedure is not considered "medically urgent" and would like to speak to Dr. Yun.   "

## 2020-03-07 ENCOUNTER — PATIENT MESSAGE (OUTPATIENT)
Dept: MEDSURG UNIT | Facility: HOSPITAL | Age: 55
End: 2020-03-07

## 2020-03-09 ENCOUNTER — HOSPITAL ENCOUNTER (INPATIENT)
Facility: HOSPITAL | Age: 55
LOS: 2 days | Discharge: HOME | DRG: 981 | End: 2020-03-13
Attending: INTERNAL MEDICINE | Admitting: INTERNAL MEDICINE
Payer: COMMERCIAL

## 2020-03-09 ENCOUNTER — ANESTHESIA (OUTPATIENT)
Dept: MEDSURG UNIT | Facility: HOSPITAL | Age: 55
DRG: 981 | End: 2020-03-09
Payer: COMMERCIAL

## 2020-03-09 ENCOUNTER — ANESTHESIA EVENT (OUTPATIENT)
Dept: MEDSURG UNIT | Facility: HOSPITAL | Age: 55
DRG: 981 | End: 2020-03-09
Payer: COMMERCIAL

## 2020-03-09 DIAGNOSIS — E05.90 HYPERTHYROIDISM: ICD-10-CM

## 2020-03-09 DIAGNOSIS — J10.1 INFLUENZA A H1N1 INFECTION: ICD-10-CM

## 2020-03-09 DIAGNOSIS — T82.118S MALFUNCTION OF IMPLANTABLE CARDIOVERTER-DEFIBRILLATOR (ICD), SEQUELA: Primary | ICD-10-CM

## 2020-03-09 DIAGNOSIS — T82.118D MALFUNCTION OF IMPLANTABLE CARDIOVERTER-DEFIBRILLATOR (ICD), SUBSEQUENT ENCOUNTER: ICD-10-CM

## 2020-03-09 DIAGNOSIS — I49.9 ARRHYTHMIA: ICD-10-CM

## 2020-03-09 DIAGNOSIS — I42.8 NICM (NONISCHEMIC CARDIOMYOPATHY): ICD-10-CM

## 2020-03-09 DIAGNOSIS — Z95.811 LVAD (LEFT VENTRICULAR ASSIST DEVICE) PRESENT: ICD-10-CM

## 2020-03-09 DIAGNOSIS — I49.01 VF (VENTRICULAR FIBRILLATION): ICD-10-CM

## 2020-03-09 DIAGNOSIS — R50.9 FEVER, UNSPECIFIED FEVER CAUSE: ICD-10-CM

## 2020-03-09 PROBLEM — F17.200 SMOKER: Status: RESOLVED | Noted: 2019-07-18 | Resolved: 2020-03-09

## 2020-03-09 LAB
APTT BLDCRRT: 34.2 SEC (ref 21–32)
INR PPP: 2.3 (ref 0.8–1.2)
PROTHROMBIN TIME: 22.1 SEC (ref 9–12.5)

## 2020-03-09 PROCEDURE — 25000003 PHARM REV CODE 250: Performed by: NURSE ANESTHETIST, CERTIFIED REGISTERED

## 2020-03-09 PROCEDURE — 27200677 HC TRANSDUCER MONITOR KIT SINGLE: Performed by: ANESTHESIOLOGY

## 2020-03-09 PROCEDURE — 93010 EKG 12-LEAD: ICD-10-PCS | Mod: 76,,, | Performed by: INTERNAL MEDICINE

## 2020-03-09 PROCEDURE — A4216 STERILE WATER/SALINE, 10 ML: HCPCS | Performed by: NURSE ANESTHETIST, CERTIFIED REGISTERED

## 2020-03-09 PROCEDURE — 33249 PR ICD INSERT SNGL/DUAL W/LEADS: ICD-10-PCS | Mod: ,,, | Performed by: INTERNAL MEDICINE

## 2020-03-09 PROCEDURE — 99223 PR INITIAL HOSPITAL CARE,LEVL III: ICD-10-PCS | Mod: 57,,, | Performed by: INTERNAL MEDICINE

## 2020-03-09 PROCEDURE — 63600175 PHARM REV CODE 636 W HCPCS: Performed by: STUDENT IN AN ORGANIZED HEALTH CARE EDUCATION/TRAINING PROGRAM

## 2020-03-09 PROCEDURE — 36620 PR INSERT CATH,ART,PERCUT,SHORTTERM: ICD-10-PCS | Mod: 59,,, | Performed by: ANESTHESIOLOGY

## 2020-03-09 PROCEDURE — D9220A PRA ANESTHESIA: ICD-10-PCS | Mod: CRNA,,, | Performed by: NURSE ANESTHETIST, CERTIFIED REGISTERED

## 2020-03-09 PROCEDURE — 63600175 PHARM REV CODE 636 W HCPCS: Performed by: NURSE PRACTITIONER

## 2020-03-09 PROCEDURE — C1721 AICD, DUAL CHAMBER: HCPCS | Performed by: INTERNAL MEDICINE

## 2020-03-09 PROCEDURE — 85610 PROTHROMBIN TIME: CPT

## 2020-03-09 PROCEDURE — C1894 INTRO/SHEATH, NON-LASER: HCPCS | Performed by: INTERNAL MEDICINE

## 2020-03-09 PROCEDURE — 36620 INSERTION CATHETER ARTERY: CPT | Mod: 59,,, | Performed by: ANESTHESIOLOGY

## 2020-03-09 PROCEDURE — 37000008 HC ANESTHESIA 1ST 15 MINUTES: Performed by: INTERNAL MEDICINE

## 2020-03-09 PROCEDURE — D9220A PRA ANESTHESIA: Mod: ANES,,, | Performed by: ANESTHESIOLOGY

## 2020-03-09 PROCEDURE — 27201423 OPTIME MED/SURG SUP & DEVICES STERILE SUPPLY: Performed by: INTERNAL MEDICINE

## 2020-03-09 PROCEDURE — 93010 ELECTROCARDIOGRAM REPORT: CPT | Mod: 76,,, | Performed by: INTERNAL MEDICINE

## 2020-03-09 PROCEDURE — 63600175 PHARM REV CODE 636 W HCPCS: Performed by: INTERNAL MEDICINE

## 2020-03-09 PROCEDURE — 63600175 PHARM REV CODE 636 W HCPCS: Performed by: NURSE ANESTHETIST, CERTIFIED REGISTERED

## 2020-03-09 PROCEDURE — 93005 ELECTROCARDIOGRAM TRACING: CPT

## 2020-03-09 PROCEDURE — D9220A PRA ANESTHESIA: Mod: CRNA,,, | Performed by: NURSE ANESTHETIST, CERTIFIED REGISTERED

## 2020-03-09 PROCEDURE — 33249 INSJ/RPLCMT DEFIB W/LEAD(S): CPT | Performed by: INTERNAL MEDICINE

## 2020-03-09 PROCEDURE — 25000003 PHARM REV CODE 250: Performed by: INTERNAL MEDICINE

## 2020-03-09 PROCEDURE — 27201037 HC PRESSURE MONITORING SET UP

## 2020-03-09 PROCEDURE — 93005 ELECTROCARDIOGRAM TRACING: CPT | Performed by: INTERNAL MEDICINE

## 2020-03-09 PROCEDURE — 25000003 PHARM REV CODE 250: Performed by: STUDENT IN AN ORGANIZED HEALTH CARE EDUCATION/TRAINING PROGRAM

## 2020-03-09 PROCEDURE — 99223 1ST HOSP IP/OBS HIGH 75: CPT | Mod: 57,,, | Performed by: INTERNAL MEDICINE

## 2020-03-09 PROCEDURE — C1751 CATH, INF, PER/CENT/MIDLINE: HCPCS | Performed by: ANESTHESIOLOGY

## 2020-03-09 PROCEDURE — 27800903 OPTIME MED/SURG SUP & DEVICES OTHER IMPLANTS: Performed by: INTERNAL MEDICINE

## 2020-03-09 PROCEDURE — 85730 THROMBOPLASTIN TIME PARTIAL: CPT

## 2020-03-09 PROCEDURE — 33249 INSJ/RPLCMT DEFIB W/LEAD(S): CPT | Mod: ,,, | Performed by: INTERNAL MEDICINE

## 2020-03-09 PROCEDURE — C1895 LEAD, AICD, ENDO DUAL COIL: HCPCS | Performed by: INTERNAL MEDICINE

## 2020-03-09 PROCEDURE — 27000248 HC VAD-ADDITIONAL DAY

## 2020-03-09 PROCEDURE — 37000009 HC ANESTHESIA EA ADD 15 MINS: Performed by: INTERNAL MEDICINE

## 2020-03-09 PROCEDURE — D9220A PRA ANESTHESIA: ICD-10-PCS | Mod: ANES,,, | Performed by: ANESTHESIOLOGY

## 2020-03-09 PROCEDURE — 93010 ELECTROCARDIOGRAM REPORT: CPT | Mod: ,,, | Performed by: INTERNAL MEDICINE

## 2020-03-09 DEVICE — CAP KIT 5867-3M STERILE OUS US EIFU
Type: IMPLANTABLE DEVICE | Site: CHEST | Status: NON-FUNCTIONAL
Removed: 2020-07-10

## 2020-03-09 DEVICE — ICD-DR DDMC3D1 EVERA MRI S US DF1
Type: IMPLANTABLE DEVICE | Site: HEART | Status: NON-FUNCTIONAL
Brand: EVERA MRI™ S DR SURESCAN™
Removed: 2020-07-10

## 2020-03-09 DEVICE — LEAD 694765 SPRINT QUATTRO US MRI
Type: IMPLANTABLE DEVICE | Site: HEART | Status: NON-FUNCTIONAL
Brand: SPRINT QUATTRO SECURE MRI™ SURESCAN™
Removed: 2020-07-10

## 2020-03-09 RX ORDER — ALLOPURINOL 100 MG/1
100 TABLET ORAL DAILY
Status: DISCONTINUED | OUTPATIENT
Start: 2020-03-09 | End: 2020-03-10

## 2020-03-09 RX ORDER — TRAMADOL HYDROCHLORIDE 50 MG/1
50 TABLET ORAL EVERY 6 HOURS PRN
Status: DISCONTINUED | OUTPATIENT
Start: 2020-03-10 | End: 2020-03-13 | Stop reason: HOSPADM

## 2020-03-09 RX ORDER — CEFAZOLIN SODIUM 1 G/3ML
2 INJECTION, POWDER, FOR SOLUTION INTRAMUSCULAR; INTRAVENOUS
Status: COMPLETED | OUTPATIENT
Start: 2020-03-09 | End: 2020-03-09

## 2020-03-09 RX ORDER — DOXAZOSIN 4 MG/1
8 TABLET ORAL DAILY
Status: DISCONTINUED | OUTPATIENT
Start: 2020-03-09 | End: 2020-03-09

## 2020-03-09 RX ORDER — METHIMAZOLE 10 MG/1
20 TABLET ORAL 2 TIMES DAILY
Status: DISCONTINUED | OUTPATIENT
Start: 2020-03-09 | End: 2020-03-13 | Stop reason: HOSPADM

## 2020-03-09 RX ORDER — FENTANYL CITRATE 50 UG/ML
25 INJECTION, SOLUTION INTRAMUSCULAR; INTRAVENOUS EVERY 5 MIN PRN
Status: DISCONTINUED | OUTPATIENT
Start: 2020-03-09 | End: 2020-03-09

## 2020-03-09 RX ORDER — FENTANYL CITRATE 50 UG/ML
INJECTION, SOLUTION INTRAMUSCULAR; INTRAVENOUS
Status: DISCONTINUED | OUTPATIENT
Start: 2020-03-09 | End: 2020-03-09

## 2020-03-09 RX ORDER — SODIUM CHLORIDE 9 MG/ML
INJECTION, SOLUTION INTRAVENOUS CONTINUOUS
Status: DISCONTINUED | OUTPATIENT
Start: 2020-03-09 | End: 2020-03-09

## 2020-03-09 RX ORDER — CEFAZOLIN SODIUM 1 G/3ML
2 INJECTION, POWDER, FOR SOLUTION INTRAMUSCULAR; INTRAVENOUS
Status: COMPLETED | OUTPATIENT
Start: 2020-03-09 | End: 2020-03-10

## 2020-03-09 RX ORDER — LIDOCAINE HYDROCHLORIDE 20 MG/ML
INJECTION, SOLUTION EPIDURAL; INFILTRATION; INTRACAUDAL; PERINEURAL
Status: DISCONTINUED | OUTPATIENT
Start: 2020-03-09 | End: 2020-03-09

## 2020-03-09 RX ORDER — AMLODIPINE BESYLATE 10 MG/1
10 TABLET ORAL DAILY
Status: DISCONTINUED | OUTPATIENT
Start: 2020-03-09 | End: 2020-03-13 | Stop reason: HOSPADM

## 2020-03-09 RX ORDER — AMIODARONE HYDROCHLORIDE 200 MG/1
400 TABLET ORAL 2 TIMES DAILY
Status: DISCONTINUED | OUTPATIENT
Start: 2020-03-09 | End: 2020-03-09

## 2020-03-09 RX ORDER — WARFARIN SODIUM 5 MG/1
5 TABLET ORAL DAILY
Status: DISCONTINUED | OUTPATIENT
Start: 2020-03-09 | End: 2020-03-11

## 2020-03-09 RX ORDER — SODIUM CHLORIDE 0.9 % (FLUSH) 0.9 %
10 SYRINGE (ML) INJECTION
Status: DISCONTINUED | OUTPATIENT
Start: 2020-03-09 | End: 2020-03-09

## 2020-03-09 RX ORDER — DOXAZOSIN 4 MG/1
8 TABLET ORAL NIGHTLY
Status: DISCONTINUED | OUTPATIENT
Start: 2020-03-10 | End: 2020-03-13 | Stop reason: HOSPADM

## 2020-03-09 RX ORDER — VANCOMYCIN HYDROCHLORIDE 1 G/20ML
INJECTION, POWDER, LYOPHILIZED, FOR SOLUTION INTRAVENOUS
Status: DISCONTINUED | OUTPATIENT
Start: 2020-03-09 | End: 2020-03-09

## 2020-03-09 RX ORDER — ZOLPIDEM TARTRATE 5 MG/1
10 TABLET ORAL NIGHTLY PRN
Status: DISCONTINUED | OUTPATIENT
Start: 2020-03-09 | End: 2020-03-13 | Stop reason: HOSPADM

## 2020-03-09 RX ORDER — PREDNISONE 10 MG/1
30 TABLET ORAL ONCE
Status: DISCONTINUED | OUTPATIENT
Start: 2020-03-09 | End: 2020-03-13 | Stop reason: HOSPADM

## 2020-03-09 RX ORDER — PANTOPRAZOLE SODIUM 40 MG/1
40 TABLET, DELAYED RELEASE ORAL DAILY
Status: DISCONTINUED | OUTPATIENT
Start: 2020-03-09 | End: 2020-03-13 | Stop reason: HOSPADM

## 2020-03-09 RX ORDER — PREDNISONE 20 MG/1
60 TABLET ORAL DAILY
Status: DISCONTINUED | OUTPATIENT
Start: 2020-03-09 | End: 2020-03-13 | Stop reason: HOSPADM

## 2020-03-09 RX ORDER — TORSEMIDE 20 MG/1
40 TABLET ORAL DAILY
Status: DISCONTINUED | OUTPATIENT
Start: 2020-03-09 | End: 2020-03-13 | Stop reason: HOSPADM

## 2020-03-09 RX ORDER — AMIODARONE HYDROCHLORIDE 200 MG/1
400 TABLET ORAL DAILY
Status: DISCONTINUED | OUTPATIENT
Start: 2020-03-10 | End: 2020-03-13 | Stop reason: HOSPADM

## 2020-03-09 RX ORDER — ALLOPURINOL 100 MG/1
100 TABLET ORAL DAILY
Status: DISCONTINUED | OUTPATIENT
Start: 2020-03-10 | End: 2020-03-09

## 2020-03-09 RX ORDER — SODIUM CHLORIDE 0.9 G/100ML
IRRIGANT IRRIGATION
Status: DISCONTINUED | OUTPATIENT
Start: 2020-03-09 | End: 2020-03-09

## 2020-03-09 RX ORDER — MIDAZOLAM HYDROCHLORIDE 1 MG/ML
INJECTION, SOLUTION INTRAMUSCULAR; INTRAVENOUS
Status: DISCONTINUED | OUTPATIENT
Start: 2020-03-09 | End: 2020-03-09

## 2020-03-09 RX ORDER — AMOXICILLIN 250 MG
1 CAPSULE ORAL DAILY PRN
Status: DISCONTINUED | OUTPATIENT
Start: 2020-03-09 | End: 2020-03-13 | Stop reason: HOSPADM

## 2020-03-09 RX ORDER — BUPIVACAINE HYDROCHLORIDE 2.5 MG/ML
INJECTION, SOLUTION EPIDURAL; INFILTRATION; INTRACAUDAL
Status: DISCONTINUED | OUTPATIENT
Start: 2020-03-09 | End: 2020-03-09

## 2020-03-09 RX ORDER — ACETAMINOPHEN 325 MG/1
650 TABLET ORAL EVERY 4 HOURS PRN
Status: DISPENSED | OUTPATIENT
Start: 2020-03-09 | End: 2020-03-12

## 2020-03-09 RX ORDER — KETAMINE HCL IN 0.9 % NACL 50 MG/5 ML
SYRINGE (ML) INTRAVENOUS
Status: DISCONTINUED | OUTPATIENT
Start: 2020-03-09 | End: 2020-03-09

## 2020-03-09 RX ADMIN — Medication 20 MG: at 10:03

## 2020-03-09 RX ADMIN — Medication 20 MG: at 09:03

## 2020-03-09 RX ADMIN — DOXAZOSIN 8 MG: 8 TABLET ORAL at 02:03

## 2020-03-09 RX ADMIN — FENTANYL CITRATE 25 MCG: 50 INJECTION, SOLUTION INTRAMUSCULAR; INTRAVENOUS at 09:03

## 2020-03-09 RX ADMIN — PANTOPRAZOLE SODIUM 40 MG: 40 TABLET, DELAYED RELEASE ORAL at 02:03

## 2020-03-09 RX ADMIN — CEFAZOLIN 2 G: 330 INJECTION, POWDER, FOR SOLUTION INTRAMUSCULAR; INTRAVENOUS at 09:03

## 2020-03-09 RX ADMIN — FENTANYL CITRATE 50 MCG: 50 INJECTION, SOLUTION INTRAMUSCULAR; INTRAVENOUS at 09:03

## 2020-03-09 RX ADMIN — PREDNISONE 60 MG: 20 TABLET ORAL at 02:03

## 2020-03-09 RX ADMIN — MIDAZOLAM HYDROCHLORIDE 2 MG: 1 INJECTION, SOLUTION INTRAMUSCULAR; INTRAVENOUS at 09:03

## 2020-03-09 RX ADMIN — SODIUM CHLORIDE: 0.9 INJECTION, SOLUTION INTRAVENOUS at 09:03

## 2020-03-09 RX ADMIN — ZOLPIDEM TARTRATE 10 MG: 5 TABLET ORAL at 08:03

## 2020-03-09 RX ADMIN — SODIUM CHLORIDE, SODIUM GLUCONATE, SODIUM ACETATE, POTASSIUM CHLORIDE, MAGNESIUM CHLORIDE, SODIUM PHOSPHATE, DIBASIC, AND POTASSIUM PHOSPHATE: .53; .5; .37; .037; .03; .012; .00082 INJECTION, SOLUTION INTRAVENOUS at 09:03

## 2020-03-09 RX ADMIN — Medication 10 MG: at 10:03

## 2020-03-09 RX ADMIN — TORSEMIDE 40 MG: 20 TABLET ORAL at 02:03

## 2020-03-09 RX ADMIN — MIDAZOLAM HYDROCHLORIDE 1 MG: 1 INJECTION, SOLUTION INTRAMUSCULAR; INTRAVENOUS at 10:03

## 2020-03-09 RX ADMIN — FENTANYL CITRATE 25 MCG: 50 INJECTION, SOLUTION INTRAMUSCULAR; INTRAVENOUS at 10:03

## 2020-03-09 RX ADMIN — MIDAZOLAM HYDROCHLORIDE 2 MG: 1 INJECTION, SOLUTION INTRAMUSCULAR; INTRAVENOUS at 11:03

## 2020-03-09 RX ADMIN — WARFARIN SODIUM 5 MG: 5 TABLET ORAL at 05:03

## 2020-03-09 RX ADMIN — DEXMEDETOMIDINE HYDROCHLORIDE 0.7 MCG/KG/HR: 100 INJECTION, SOLUTION, CONCENTRATE INTRAVENOUS at 09:03

## 2020-03-09 RX ADMIN — ACETAMINOPHEN 650 MG: 325 TABLET ORAL at 09:03

## 2020-03-09 RX ADMIN — AMLODIPINE BESYLATE 10 MG: 10 TABLET ORAL at 02:03

## 2020-03-09 RX ADMIN — CEFAZOLIN 2 G: 1 INJECTION, POWDER, FOR SOLUTION INTRAMUSCULAR; INTRAVENOUS at 05:03

## 2020-03-09 NOTE — ANESTHESIA POSTPROCEDURE EVALUATION
Anesthesia Post Evaluation    Patient: Tim Richards    Procedure(s) Performed: Procedure(s) (LRB):  REVISION, INSERTION, ELECTRODE LEAD, ICD (N/A)  REPLACEMENT, ICD GENERATOR (N/A)    Final Anesthesia Type: general    Patient location during evaluation: PACU  Patient participation: Yes- Able to Participate  Level of consciousness: awake and alert and oriented  Post-procedure vital signs: reviewed and stable  Pain management: adequate  Airway patency: patent    PONV status at discharge: No PONV  Anesthetic complications: no      Cardiovascular status: blood pressure returned to baseline, hemodynamically stable and stable  Respiratory status: unassisted, room air and spontaneous ventilation  Hydration status: euvolemic  Follow-up not needed.          Vitals Value Taken Time   BP 88/0 3/9/2020  2:00 PM   Temp 36.7 °C (98 °F) 3/9/2020  2:00 PM   Pulse 89 3/9/2020  2:00 PM   Resp 20 3/9/2020  2:00 PM   SpO2 100 % 3/9/2020  2:00 PM         Event Time     Out of Recovery 13:45:00          Pain/Iker Score: Pain Rating Prior to Med Admin: 0 (3/9/2020  1:25 PM)  Iker Score: 10 (3/9/2020  1:25 PM)

## 2020-03-09 NOTE — BRIEF OP NOTE
New RV HV lead placed (dual coil) to replace current single coil RV HV lead in place given prior failed shocks from device. LV lead and old RV HV lead capped in pocket. RA and new RV lead connected to new device given prior generator at [facility]. DFT performed with successful defibrillation on first shock from device.     Tolerated well.   IV antibiotics over night.   Home tomorrow.   CXR.   Pressure dressing will be removed in am.     Daren Sanchez MD PGY7

## 2020-03-09 NOTE — Clinical Note
Lead inserted, under fluorscopic guidance, into the right ventricle. New leads were attached to the following locations: right ventricle.

## 2020-03-09 NOTE — PLAN OF CARE
Transfer from short stay s/p Generator change and ICD lead revision. AAOx4, VSS. Heartmate 2 LVAD. Arrived in stable condition. Spouse at bedside attentive, supportive of patient. Admitted for IV antibiotics over night and D/C in AM per MD notes. NADN. Pain controlled at the present time. LUE immobilized. Left chest wall pressure dressing CDI, no bleeding noted.

## 2020-03-09 NOTE — PROGRESS NOTES
Late entry. Pressure dressing applied to left chest incision site and arm sling by cath lab staff at 1253. Patient denies pain. Pulse oximeter picking up O2 sats intermittently. O2 sats % when reading picked up. Left radial artline discontinued; pressure held x 20 min and pressure dressing applied. No sign of bleeding noted.

## 2020-03-09 NOTE — TRANSFER OF CARE
"Anesthesia Transfer of Care Note    Patient: Tim Richards    Procedure(s) Performed: Procedure(s) (LRB):  REVISION, INSERTION, ELECTRODE LEAD, ICD (N/A)  REPLACEMENT, ICD GENERATOR (N/A)    Patient location: PACU    Anesthesia Type: general    Transport from OR: Transported from OR on 6-10 L/min O2 by face mask with adequate spontaneous ventilation    Post pain: adequate analgesia    Post assessment: no apparent anesthetic complications and tolerated procedure well    Post vital signs: stable    Level of consciousness: awake, alert and oriented    Nausea/Vomiting: no nausea/vomiting    Complications: none    Transfer of care protocol was followed      Last vitals:   Visit Vitals  BP (!) 109/99 (BP Location: Right arm)   Pulse (!) 59   Temp 35.9 °C (96.6 °F) (Axillary)   Resp 18   Ht 6' 1" (1.854 m)   Wt 120.2 kg (265 lb)   SpO2 99%   BMI 34.96 kg/m²     "

## 2020-03-09 NOTE — Clinical Note
Preexisting RV lead SPRINTER YUNATRDEREK WU # [id number] capped and preexisting LV lead PERFORMA QUAD POLAR # [id number] capped

## 2020-03-09 NOTE — H&P
"Electrophysiology Pre Procedure H&P    HPI:   Tim Richards is a 54 y.o. male with DCM EF 10% s/p LVAD (DT as of now), HTN, and VT/VF s/p medtronic crt-d (2014, Dr Chiu) who [place] to [facility] [place] generator change (device at [facility]) and placement of new ICD lead. He currently has a single coil ICD lead. He previously had sustained VT and VF which failed multiple shocks from the device and required external defibrillation for rescue. He was in decompensated heart failure at that time. Once he was compensated, DFTs were performed which were successful. He has no fever or chills. He has been feeling subjectively "pretty good" for the last couple of days per him.     From Dr Wagner's clinic note 12/31/19:  "Tim Richards 55 yo AAM with DCM,(EF 10%, grade III DD) s/p HM II with Outflow graft changed (03/9/18) as DT, BiV-Icd Medtronic (2014), HtN, obesity comes in the Ep clinic for follow up of VT and ICD management.  He was admitted to the hospital in 10/2019 with ICD shocks. When he came to the ER he was noted to be in MMVT. He had multiple rounds of ATP that degenerated the MMVT into VF and the he was unsuccessfully shocked multiple times . He was eventually shocked externally to convert into sinus rhythm .  It was postulated reasons for ineffective therapy (had worsening hypervolemia week prior, length of time in the arrhythmia prior to ICD shock, IV amiodarone causing higher defibrillatory tissue threshold, or change in heart-shock vector post LVAD implant). He had a NIPS which showed successful DFT at 35J. He was discharge after being diuresed. Since discharge, he has done well clinically .   Device check noted on 12/14/19 - one episode of MMVT 270 ms that was appropriately successfully shocked. He has had multiple non sustained slower VT. Of note is he had a monitoring zone added during his admission at 133. VT 1 zone at 167 does not have ATp and goes directly to 35J shock. We will add 6 rounds of " "ATP before shock in that zone. (refer to device interrogation in media). Device longevity - 4 months to [facility]."  Past Medical History:   Diagnosis Date    CHF (congestive heart failure)     Dilated cardiomyopathy 1/10/2018    Disorder of kidney and ureter     CKD    Gout     HTN (hypertension)     Ventricular tachycardia (paroxysmal)         Social History     Socioeconomic History    Marital status:      Spouse name: Not on file    Number of children: Not on file    Years of education: Not on file    Highest education level: Not on file   Occupational History     Employer: [employer]    Social Needs    Financial resource strain: Not hard at all    Food insecurity:     Worry: Never true     Inability: Never true    Transportation needs:     Medical: No     Non-medical: No   Tobacco Use    Smoking status: Former Smoker     Packs/day: 1.00     Years: 31.00     Pack years: 31.00     Types: Cigarettes     Last attempt to quit: 2018     Years since quittin.1    Smokeless tobacco: Never Used    Tobacco comment: pt is quiting on his own - pt stated not qualified for program;  pt  quit on his own   Substance and Sexual Activity    Alcohol use: No     Alcohol/week: 0.0 standard drinks     Frequency: Never     Drinks per session: Patient refused     Binge frequency: Never     Comment: one fifth of gin or rum/week    Drug use: No    Sexual activity: Yes     Partners: Female     Birth control/protection: None     Comment: 10/5/17  with same partner 7 years    Lifestyle    Physical activity:     Days per week: Not on file     Minutes per session: 60 min    Stress: Very much   Relationships    Social connections:     Talks on phone: More than three times a week     Gets together: More than three times a week     Attends Islam service: Not on file     Active member of club or organization: Yes     Attends meetings of clubs or organizations: More than 4 times per year     " "Relationship status:    Other Topics Concern    Not on file   Social History Narrative    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        Family History   Problem Relation Age of Onset    Hypertension Father     Diabetes Father     Coronary artery disease Father     Heart disease Father         CHF    No Known Problems Mother     Cancer Sister 54        breast CA    No Known Problems Brother         Current Facility-Administered Medications   Medication Dose Route Frequency Provider Last Rate Last Dose    0.9%  NaCl infusion   Intravenous Continuous Ivette Neri NP        ceFAZolin injection 2 g  2 g Intravenous On Call Procedure Ivette Neri NP            Constitutional: (-)fevers, (-)chills, (-)night sweats  HEENT: (-) headaches (-) lightheadedness (-) blurry vision  Cardiovascular: (-)chest pain (-)paroxysmal nocturnal dyspnea (-)orthopnea  Respiratory: (-)shortness of breath, (-)dyspnea on exertion (-)hemoptysis  Gastrointestinal: (-)abdominal pain (-)nausea (-)vomiting (-)tarry stools  Musculoskeletal: (-)arthralgias (-)limited range of motion  Neurologic: (-)parasthesias (-)mood disorder (-)anxiety  Endo: (-)polyuria (-)polydipsia (-)heat/cold intolerance  Skin: (-)rash     Vitals:    03/09/20 0744   Pulse: 84   Resp: 18   Temp: 97 °F (36.1 °C)   TempSrc: Oral   SpO2: 96%   Weight: 120.2 kg (265 lb)   Height: 6' 1" (1.854 m)     Physical Exam:   Gen: no acute distress, pleasant patient answering questions appropriately  HEENT: extra-ocular muscles intact, normocephalic-atraumatic  Neck: no lymphadenopathy  CVS: hum of lvad  CHEST: clear to auscultation bilaterally, no wheezes/rales/ronchi  ABD: Soft, non-tender, nondistended, bowel sounds present  EXT: trace ble edema  NEURO: awake, alert, and oriented   Skin: No rash     Review of Labs:   Lab Results   Component Value Date    INR 3.3 (H) 03/06/2020    INR 3.6 (H) 03/02/2020    INR 1.9 (H) 02/27/2020     Lab Results   Component " Value Date    WBC 8.50 03/02/2020    HGB 12.9 (L) 03/02/2020    HCT 40.1 03/02/2020    MCV 82 03/02/2020     03/02/2020     CMP  Sodium   Date Value Ref Range Status   03/02/2020 136 136 - 145 mmol/L Final     Potassium   Date Value Ref Range Status   03/02/2020 3.9 3.5 - 5.1 mmol/L Final     Chloride   Date Value Ref Range Status   03/02/2020 96 (L) 101 - 111 mmol/L Final     CO2   Date Value Ref Range Status   03/02/2020 27 23 - 29 mmol/L Final     Glucose   Date Value Ref Range Status   03/02/2020 130 (H) 74 - 118 mg/dL Final     BUN, Bld   Date Value Ref Range Status   03/02/2020 23 (H) 6 - 20 mg/dL Final     Creatinine   Date Value Ref Range Status   03/02/2020 1.7 (H) 0.5 - 1.4 mg/dL Final     Calcium   Date Value Ref Range Status   03/02/2020 9.5 8.6 - 10.0 mg/dL Final     Total Protein   Date Value Ref Range Status   03/02/2020 7.3 6.0 - 8.4 g/dL Final     Albumin   Date Value Ref Range Status   03/02/2020 4.0 3.5 - 5.2 g/dL Final     Total Bilirubin   Date Value Ref Range Status   03/02/2020 0.5 0.3 - 1.2 mg/dL Final     Comment:     For infants and newborns, interpretation of results should be based  on gestational age, weight and in agreement with clinical  observations.  Premature Infant recommended reference ranges:  Up to 24 hours.............<8.0 mg/dL  Up to 48 hours............<12.0 mg/dL  3-5 days..................<15.0 mg/dL  6-29 days.................<15.0 mg/dL       Alkaline Phosphatase   Date Value Ref Range Status   03/02/2020 77 38 - 126 U/L Final     AST   Date Value Ref Range Status   03/02/2020 32 15 - 41 U/L Final     ALT   Date Value Ref Range Status   03/02/2020 36 17 - 63 U/L Final     Anion Gap   Date Value Ref Range Status   03/02/2020 13 8 - 16 mmol/L Final     eGFR if    Date Value Ref Range Status   03/02/2020 52.1 (A) >60 mL/min/1.73 m^2 Final     eGFR if non    Date Value Ref Range Status   03/02/2020 45.0 (A) >60 mL/min/1.73 m^2 Final      Comment:     Calculation used to obtain the estimated glomerular filtration  rate (eGFR) is the CKD-EPI equation.        Most recent ECG  sinus    Assessment/Plan:  Tim Richards is a 54 y.o. male with DCM EF 10% s/p LVAD (DT as of now), HTN, and VT/VF s/p medtronic crt-d (2014, Dr Chiu) who [place] [facility] [place] (device at [facility]) and placement of new ICD lead.  We discussed the alternatives, benefits and risks of the procedure including pain, infection, bleeding, injury to lung causing pneumothorax requiring tube placement, injury to heart valves, puncture of the heart leading to pericardial effusion or tamponade requiring tube drainage, heart attack, stroke and death.  Consent signed and placed in chart.   We will add a dual coil ICD lead given previous failed shocks. We will then perform DFT testing. If device fails with dual coil lead, we will then add an azygous coil and repeat DFTs. If DFTs fail again with azygous coil, we will remove the azygous coil and place a CS coil with subsequent repeat DFT testing. If DFTs fail in all of the outlined leads mentioned above, we would have to discuss other options with him at a later date (subq icd).     Daren Sanchez MD PGY7

## 2020-03-09 NOTE — ANESTHESIA PREPROCEDURE EVALUATION
03/09/2020  Pre-operative evaluation for Procedure(s) (LRB):  REVISION, INSERTION, ELECTRODE LEAD, ICD (N/A)  REPLACEMENT, ICD GENERATOR (N/A)    Tim Richards is a 54 y.o. male hx of LVAD ~ 2years ago presenting for the above procedure.  Hx of CKDIII, obesity    · LVAD present. Base speed is 57101 RPMs. The pump type is a Heartmate II.  · The interventricular septum appears midline. The aortic valve does not open.  · Severely decreased left ventricular systolic function. The estimated ejection fraction is <10%.  · Eccentric left ventricular hypertrophy.  · Global hypokinetic wall motion.  · Mild-to-moderate mitral regurgitation.  · Mild tricuspid regurgitation.  · The estimated PA systolic pressure is 13 mmHg.  · Normal central venous pressure (3 mmHg).  · Poor technically study    Patient Active Problem List   Diagnosis    Cigarette nicotine dependence without complication    Alcohol abuse    Pulmonary nodule seen on imaging study    Biventricular implantable cardioverter-defibrillator in situ    High risk medications (amiodarone) long-term use    DCM (dilated cardiomyopathy)    CKD (chronic kidney disease), stage III    Hypertensive cardiovascular-renal disease, stage 1-4 or unspecified chronic kidney disease, with heart failure    LVAD (left ventricular assist device) present    Hyperglycemia    Hyperbilirubinemia    Anemia    Hypertension    Long term (current) use of anticoagulants    Left ventricular assist device (LVAD) complication    Pre-transplant evaluation for heart transplant    SOB (shortness of breath)    GIB (gastrointestinal bleeding)    Leukocytosis    Bloody diarrhea    Thrombocytopenia, unspecified    Smoker    GI bleed    History of bleeding ulcers    Shortness of breath    VT (ventricular tachycardia)    Defibrillator discharge    Severe obesity (BMI  35.0-39.9) with comorbidity    Palpitations    Amiodarone-induced hyperthyroidism    Heart replaced by heart assist device    Presence of left ventricular assist device (LVAD)    Hyperthyroidism    PMT (pacemaker-mediated tachycardia)    Substance or medication-induced sleep disorder, insomnia type    VF (ventricular fibrillation)       Review of patient's allergies indicates:   Allergen Reactions    Lisinopril Anaphylaxis    Hydralazine analogues      Chronic constipation, impotence, dizziness       No current facility-administered medications on file prior to encounter.      Current Outpatient Medications on File Prior to Encounter   Medication Sig Dispense Refill    allopurinol (ZYLOPRIM) 100 MG tablet TAKE 1 TABLET BY MOUTH EVERY DAY 30 tablet 5    amiodarone (PACERONE) 400 MG tablet Take 1 tablet (400 mg total) by mouth 2 (two) times daily. 60 tablet 11    amLODIPine (NORVASC) 10 MG tablet Take 1 tablet (10 mg total) by mouth once daily. (Patient taking differently: Take 10 mg by mouth 2 (two) times daily. ) 30 tablet 11    doxazosin (CARDURA) 8 MG Tab Take 1 tablet (8 mg total) by mouth every 12 (twelve) hours. 60 tablet 11    ferrous gluconate (FERGON) 324 MG tablet Take 1 tablet (324 mg total) by mouth daily with breakfast. 90 tablet 6    magnesium oxide (MAG-OX) 400 mg (241.3 mg magnesium) tablet TAKE 1 TABLET (400 MG TOTAL) BY MOUTH 3 (THREE) TIMES DAILY. 270 tablet 2    pantoprazole (PROTONIX) 40 MG tablet TAKE 1 TABLET (40 MG TOTAL) BY MOUTH ONCE DAILY. 90 tablet 3    potassium chloride (K-TAB) 20 mEq Take 2 tablets (40 mEq total) by mouth once daily. 60 tablet 11    torsemide (DEMADEX) 20 MG Tab Take 2 tablets (40 mg total) by mouth once daily. 60 tablet 11    warfarin (COUMADIN) 5 MG tablet Take 1 tablet (5 mg total) by mouth Daily. 30 tablet 11    sildenafil (VIAGRA) 100 MG tablet Take 1 tablet (100 mg total) by mouth daily as needed for Erectile Dysfunction. 10 tablet 1     warfarin (COUMADIN) 5 MG tablet TAKE 1 TABLETS (5MG) BY MOUTH ON TUES, THURS, SAT. TAKE 1.5 TABLETS (7.5MG) ALL OTHER DAYS. 135 tablet 3       Past Surgical History:   Procedure Laterality Date    CARDIAC CATHETERIZATION  Dec. 2012    CARDIAC DEFIBRILLATOR PLACEMENT Left     CRRT-D    COLONOSCOPY N/A 3/6/2018    Procedure: COLONOSCOPY;  Surgeon: Alonso Bone MD;  Location: [facility] ENDO (2ND FLR);  Service: Endoscopy;  Laterality: N/A;    COLONOSCOPY N/A 7/17/2019    Procedure: COLONOSCOPY;  Surgeon: Blane Valdez MD;  Location: [facility] ENDO (2ND FLR);  Service: Endoscopy;  Laterality: N/A;    COLONOSCOPY N/A 7/18/2019    Procedure: COLONOSCOPY;  Surgeon: Blane Valdez MD;  Location: [facility] ENDO (2ND FLR);  Service: Endoscopy;  Laterality: N/A;    ESOPHAGOGASTRODUODENOSCOPY N/A 7/17/2019    Procedure: EGD (ESOPHAGOGASTRODUODENOSCOPY);  Surgeon: Blane Valdez MD;  Location: [facility] ENDO (2ND FLR);  Service: Endoscopy;  Laterality: N/A;    ESOPHAGOGASTRODUODENOSCOPY N/A 7/18/2019    Procedure: EGD (ESOPHAGOGASTRODUODENOSCOPY);  Surgeon: Blane Valdez MD;  Location: [facility] ENDO (2ND FLR);  Service: Endoscopy;  Laterality: N/A;    NONINVASIVE CARDIAC ELECTROPHYSIOLOGY STUDY N/A 10/18/2019    Procedure: CARDIAC ELECTROPHYSIOLOGY STUDY, NONINVASIVE;  Surgeon: Raz Wagner MD;  Location: [facility] EP LAB;  Service: Cardiology;  Laterality: N/A;  VT, DFTs, MDT CRTD in situ, LVAD, anes, MB, 3091    TREATMENT OF CARDIAC ARRHYTHMIA  10/18/2019    Procedure: Cardioversion or Defibrillation;  Surgeon: Raz Wagner MD;  Location: [facility] EP LAB;  Service: Cardiology;;       Social History     Socioeconomic History    Marital status:      Spouse name: Not on file    Number of children: Not on file    Years of education: Not on file    Highest education level: Not on file   Occupational History     Employer: [employer]    Social Needs    Financial resource strain: Not hard at all    Food insecurity:     Worry: Never true      Inability: Never true    Transportation needs:     Medical: No     Non-medical: No   Tobacco Use    Smoking status: Former Smoker     Packs/day: 1.00     Years: 31.00     Pack years: 31.00     Types: Cigarettes     Last attempt to quit: 2018     Years since quittin.1    Smokeless tobacco: Never Used    Tobacco comment: pt is quiting on his own - pt stated not qualified for program;  pt  quit on his own   Substance and Sexual Activity    Alcohol use: No     Alcohol/week: 0.0 standard drinks     Frequency: Never     Drinks per session: Patient refused     Binge frequency: Never     Comment: one fifth of gin or rum/week    Drug use: No    Sexual activity: Yes     Partners: Female     Birth control/protection: None     Comment: 10/5/17  with same partner 7 years    Lifestyle    Physical activity:     Days per week: Not on file     Minutes per session: 60 min    Stress: Very much   Relationships    Social connections:     Talks on phone: More than three times a week     Gets together: More than three times a week     Attends Temple service: Not on file     Active member of club or organization: Yes     Attends meetings of clubs or organizations: More than 4 times per year     Relationship status:    Other Topics Concern    Not on file   Social History Narrative    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    CMP: No results for input(s): NA, K, CL, CO2, BUN, CREATININE, GLU, MG, PHOS, CALCIUM, ALBUMIN, PROT, ALKPHOS, ALT, AST, BILITOT in the last 72 hours.    INR  Recent Labs     20  1128   INR 3.3*           Diagnostic Studies:      EKD Echo:  Results for orders placed or performed during the hospital encounter of 18   2D Echo w/ Color Flow Doppler   Result Value Ref Range    QEF 15 (A) 55 - 65    Mitral Valve Regurgitation SEVERE (A)     Est. PA Systolic  Pressure 33.6     Mitral Valve Mobility NORMAL     Tricuspid Valve Regurgitation MILD          Anesthesia Evaluation    I have reviewed the Patient Summary Reports.    I have reviewed the Nursing Notes.   I have reviewed the Medications.     Review of Systems  Anesthesia Hx:  No problems with previous Anesthesia  History of prior surgery of interest to airway management or planning: Denies Family Hx of Anesthesia complications.   Denies Personal Hx of Anesthesia complications.   Cardiovascular:   Exercise tolerance: poor Hypertension CHF ECG has been reviewed.    Pulmonary:   Denies COPD.  Denies Asthma. Sleep Apnea    Renal/:   Chronic Renal Disease    Hepatic/GI:   Denies GERD. Denies Liver Disease.    Neurological:   Denies CVA. Denies Seizures.    Endocrine:   Denies Diabetes. Hyperthyroidism        Physical Exam  General:  Well nourished, Obesity    Airway/Jaw/Neck:  Airway Findings: Mouth Opening: Normal Tongue: Normal  General Airway Assessment: Adult  Mallampati: III  Improves to II with phonation.  TM Distance: Normal, at least 6 cm  Jaw/Neck Findings:  Neck ROM: Normal ROM      Dental:  Dental Findings: In tact   Chest/Lungs:  Chest/Lungs Findings: Clear to auscultation, Normal Respiratory Rate     Heart/Vascular:  Heart Findings: Rate: Normal        Mental Status:  Mental Status Findings:  Cooperative, Alert and Oriented         Anesthesia Plan  Type of Anesthesia, risks & benefits discussed:  Anesthesia Type:  general  Patient's Preference:   Intra-op Monitoring Plan: arterial line and standard ASA monitors  Intra-op Monitoring Plan Comments:   Post Op Pain Control Plan: per primary service following discharge from PACU  Post Op Pain Control Plan Comments:   Induction:   IV  Beta Blocker:  Patient is not currently on a Beta-Blocker (No further documentation required).       Informed Consent: Patient understands risks and agrees with Anesthesia plan.  Questions answered. Anesthesia consent signed with  patient.  ASA Score: 4     Day of Surgery Review of History & Physical:    H&P update referred to the provider.         Ready For Surgery From Anesthesia Perspective.

## 2020-03-09 NOTE — PLAN OF CARE
Received report from EMILY So. Patient s/p generator change and lead revision, AAOx3. VSS, no c/o pain or discomfort at this time, resp even and unlabored. Dressing to chest wall is CDI. No active bleeding. No hematoma noted. Post procedure protocol reviewed with patient and patient's family. Understanding verbalized. Family members at bedside. Nurse call bell within reach. Will continue to monitor per post procedure protocol.

## 2020-03-09 NOTE — ANESTHESIA PROCEDURE NOTES
Arterial    Diagnosis: heart failure  Doctor requesting consult: brooks    Patient location during procedure: done in OR  Procedure start time: 3/9/2020 9:21 AM  Timeout: 3/9/2020 9:20 AM  Procedure end time: 3/9/2020 9:25 AM    Staffing  Authorizing Provider: Andrae Barrett Jr., MD  Performing Provider: Andrae Barrett Jr., MD    Anesthesiologist was present at the time of the procedure.    Preanesthetic Checklist  Completed: patient identified, site marked, surgical consent, pre-op evaluation, timeout performed, IV checked, risks and benefits discussed, monitors and equipment checked and anesthesia consent givenArterial  Skin Prep: chlorhexidine gluconate and isopropyl alcohol  Local Infiltration: lidocaine  Orientation: left  Location: radial  Catheter Size: 20 G  Catheter placement by Ultrasound guidance. Heme positive aspiration all ports.  Vessel Caliber: medium, patent, compressibility normal  Vascular Doppler:  not done  Needle advanced into vessel with real time Ultrasound guidance.  Guidewire confirmed in vessel.Insertion Attempts: 1  Assessment  Dressing: secured with tape and tegaderm  Patient: Tolerated well

## 2020-03-10 ENCOUNTER — TELEPHONE (OUTPATIENT)
Dept: ELECTROPHYSIOLOGY | Facility: CLINIC | Age: 55
End: 2020-03-10

## 2020-03-10 LAB
ANION GAP SERPL CALC-SCNC: 12 MMOL/L (ref 8–16)
BASOPHILS # BLD AUTO: 0.01 K/UL (ref 0–0.2)
BASOPHILS NFR BLD: 0.1 % (ref 0–1.9)
BUN SERPL-MCNC: 28 MG/DL (ref 6–20)
CALCIUM SERPL-MCNC: 8.9 MG/DL (ref 8.7–10.5)
CHLORIDE SERPL-SCNC: 100 MMOL/L (ref 95–110)
CO2 SERPL-SCNC: 30 MMOL/L (ref 23–29)
CREAT SERPL-MCNC: 1.5 MG/DL (ref 0.5–1.4)
DIFFERENTIAL METHOD: ABNORMAL
EOSINOPHIL # BLD AUTO: 0 K/UL (ref 0–0.5)
EOSINOPHIL NFR BLD: 0 % (ref 0–8)
ERYTHROCYTE [DISTWIDTH] IN BLOOD BY AUTOMATED COUNT: 16.3 % (ref 11.5–14.5)
EST. GFR  (AFRICAN AMERICAN): >60 ML/MIN/1.73 M^2
EST. GFR  (NON AFRICAN AMERICAN): 52.4 ML/MIN/1.73 M^2
GLUCOSE SERPL-MCNC: 115 MG/DL (ref 70–110)
HCT VFR BLD AUTO: 42.1 % (ref 40–54)
HGB BLD-MCNC: 12.4 G/DL (ref 14–18)
IMM GRANULOCYTES # BLD AUTO: 0.17 K/UL (ref 0–0.04)
IMM GRANULOCYTES NFR BLD AUTO: 1.7 % (ref 0–0.5)
INR PPP: 2.4 (ref 0.8–1.2)
LDH SERPL L TO P-CCNC: 434 U/L (ref 110–260)
LYMPHOCYTES # BLD AUTO: 0.3 K/UL (ref 1–4.8)
LYMPHOCYTES NFR BLD: 3 % (ref 18–48)
MAGNESIUM SERPL-MCNC: 2 MG/DL (ref 1.6–2.6)
MCH RBC QN AUTO: 26.3 PG (ref 27–31)
MCHC RBC AUTO-ENTMCNC: 29.5 G/DL (ref 32–36)
MCV RBC AUTO: 89 FL (ref 82–98)
MONOCYTES # BLD AUTO: 0.5 K/UL (ref 0.3–1)
MONOCYTES NFR BLD: 5.2 % (ref 4–15)
NEUTROPHILS # BLD AUTO: 8.8 K/UL (ref 1.8–7.7)
NEUTROPHILS NFR BLD: 90 % (ref 38–73)
NRBC BLD-RTO: 0 /100 WBC
PHOSPHATE SERPL-MCNC: 3.8 MG/DL (ref 2.7–4.5)
PLATELET # BLD AUTO: 187 K/UL (ref 150–350)
PMV BLD AUTO: 11 FL (ref 9.2–12.9)
POTASSIUM SERPL-SCNC: 3.9 MMOL/L (ref 3.5–5.1)
PROTHROMBIN TIME: 22.9 SEC (ref 9–12.5)
RBC # BLD AUTO: 4.72 M/UL (ref 4.6–6.2)
SODIUM SERPL-SCNC: 142 MMOL/L (ref 136–145)
WBC # BLD AUTO: 9.76 K/UL (ref 3.9–12.7)

## 2020-03-10 PROCEDURE — 80048 BASIC METABOLIC PNL TOTAL CA: CPT

## 2020-03-10 PROCEDURE — 83615 LACTATE (LD) (LDH) ENZYME: CPT

## 2020-03-10 PROCEDURE — 85610 PROTHROMBIN TIME: CPT

## 2020-03-10 PROCEDURE — 27000685 HC LVAD KIT (30 DAY SUPPLY)

## 2020-03-10 PROCEDURE — 85025 COMPLETE CBC W/AUTO DIFF WBC: CPT

## 2020-03-10 PROCEDURE — 83735 ASSAY OF MAGNESIUM: CPT

## 2020-03-10 PROCEDURE — 36415 COLL VENOUS BLD VENIPUNCTURE: CPT

## 2020-03-10 PROCEDURE — 99214 PR OFFICE/OUTPT VISIT, EST, LEVL IV, 30-39 MIN: ICD-10-PCS | Mod: ,,, | Performed by: NURSE PRACTITIONER

## 2020-03-10 PROCEDURE — 84100 ASSAY OF PHOSPHORUS: CPT

## 2020-03-10 PROCEDURE — 63600175 PHARM REV CODE 636 W HCPCS: Performed by: STUDENT IN AN ORGANIZED HEALTH CARE EDUCATION/TRAINING PROGRAM

## 2020-03-10 PROCEDURE — 25000003 PHARM REV CODE 250: Performed by: STUDENT IN AN ORGANIZED HEALTH CARE EDUCATION/TRAINING PROGRAM

## 2020-03-10 PROCEDURE — 93750 PR INTERROGATE VENT ASSIST DEV, IN PERSON, W PHYSICIAN ANALYSIS: ICD-10-PCS | Mod: ,,, | Performed by: INTERNAL MEDICINE

## 2020-03-10 PROCEDURE — 25000003 PHARM REV CODE 250: Performed by: NURSE PRACTITIONER

## 2020-03-10 PROCEDURE — 25000003 PHARM REV CODE 250: Performed by: INTERNAL MEDICINE

## 2020-03-10 PROCEDURE — 27000248 HC VAD-ADDITIONAL DAY

## 2020-03-10 PROCEDURE — 99214 OFFICE O/P EST MOD 30 MIN: CPT | Mod: ,,, | Performed by: NURSE PRACTITIONER

## 2020-03-10 PROCEDURE — 93750 INTERROGATION VAD IN PERSON: CPT | Mod: ,,, | Performed by: INTERNAL MEDICINE

## 2020-03-10 RX ORDER — ALLOPURINOL 100 MG/1
100 TABLET ORAL DAILY
Status: DISCONTINUED | OUTPATIENT
Start: 2020-03-11 | End: 2020-03-13 | Stop reason: HOSPADM

## 2020-03-10 RX ORDER — CEPHALEXIN 500 MG/1
500 CAPSULE ORAL EVERY 8 HOURS
Status: DISCONTINUED | OUTPATIENT
Start: 2020-03-10 | End: 2020-03-11

## 2020-03-10 RX ORDER — DIPHENHYDRAMINE HCL 25 MG
25 CAPSULE ORAL EVERY 6 HOURS PRN
Status: DISCONTINUED | OUTPATIENT
Start: 2020-03-10 | End: 2020-03-13 | Stop reason: HOSPADM

## 2020-03-10 RX ADMIN — TRAMADOL HYDROCHLORIDE 50 MG: 50 TABLET, FILM COATED ORAL at 12:03

## 2020-03-10 RX ADMIN — CEPHALEXIN 500 MG: 500 CAPSULE ORAL at 09:03

## 2020-03-10 RX ADMIN — METHIMAZOLE 20 MG: 10 TABLET ORAL at 08:03

## 2020-03-10 RX ADMIN — METHIMAZOLE 20 MG: 10 TABLET ORAL at 09:03

## 2020-03-10 RX ADMIN — CEPHALEXIN 500 MG: 500 CAPSULE ORAL at 01:03

## 2020-03-10 RX ADMIN — WARFARIN SODIUM 5 MG: 5 TABLET ORAL at 05:03

## 2020-03-10 RX ADMIN — DOXAZOSIN 8 MG: 4 TABLET ORAL at 08:03

## 2020-03-10 RX ADMIN — PREDNISONE 60 MG: 20 TABLET ORAL at 09:03

## 2020-03-10 RX ADMIN — TORSEMIDE 40 MG: 20 TABLET ORAL at 09:03

## 2020-03-10 RX ADMIN — DIPHENHYDRAMINE HYDROCHLORIDE 25 MG: 25 CAPSULE ORAL at 02:03

## 2020-03-10 RX ADMIN — ALLOPURINOL 100 MG: 100 TABLET ORAL at 09:03

## 2020-03-10 RX ADMIN — AMLODIPINE BESYLATE 10 MG: 10 TABLET ORAL at 09:03

## 2020-03-10 RX ADMIN — PANTOPRAZOLE SODIUM 40 MG: 40 TABLET, DELAYED RELEASE ORAL at 09:03

## 2020-03-10 RX ADMIN — CEFAZOLIN 2 G: 1 INJECTION, POWDER, FOR SOLUTION INTRAMUSCULAR; INTRAVENOUS at 12:03

## 2020-03-10 RX ADMIN — AMIODARONE HYDROCHLORIDE 400 MG: 200 TABLET ORAL at 09:03

## 2020-03-10 NOTE — HPI
"Tim Richards is a 54 y.o. male with DCM EF 10% s/p LVAD (DT as of now), HTN, and VT/VF s/p medtronic crt-d (2014, Dr Chiu) who presents to [facility] [place] for outpatient generator change (device at [facility]) and placement of new ICD lead. He currently has a single coil ICD lead. He previously had sustained VT and VF which failed multiple shocks from the device and required external defibrillation for rescue. He was in decompensated heart failure at that time. Once he was compensated, DFTs were performed which were successful. He has no fever or chills. He has been feeling subjectively "pretty good" for the last couple of days per him.      From Dr Wagner's clinic note 12/31/19:  "Tim Richards 53 yo AAM with DCM,(EF 10%, grade III DD) s/p HM II with Outflow graft changed (03/9/18) as DT, BiV-Icd Medtronic (2014), HtN, obesity comes in the Ep clinic for follow up of VT and ICD management.  He was admitted to the hospital in 10/2019 with ICD shocks. When he came to the ER he was noted to be in MMVT. He had multiple rounds of ATP that degenerated the MMVT into VF and the he was unsuccessfully shocked multiple times . He was eventually shocked externally to convert into sinus rhythm .  It was postulated reasons for ineffective therapy (had worsening hypervolemia week prior, length of time in the arrhythmia prior to ICD shock, IV amiodarone causing higher defibrillatory tissue threshold, or change in heart-shock vector post LVAD implant). He had a NIPS which showed successful DFT at 35J. He was discharge after being diuresed. Since discharge, he has done well clinically .   Device check noted on 12/14/19 - one episode of MMVT 270 ms that was appropriately successfully shocked. He has had multiple non sustained slower VT. Of note is he had a monitoring zone added during his admission at 133. VT 1 zone at 167 does not have ATp and goes directly to 35J shock. We will add 6 rounds of ATP before shock in that zone. (refer to " "device interrogation in media). Device longevity - 4 months to [facility]."      "

## 2020-03-10 NOTE — TELEPHONE ENCOUNTER
----- Message from Carissa Anguiano sent at 3/10/2020  2:08 PM CDT -----  Contact: Patient  The Pt is calling to speak with you regarding his stay in the hospital. I had advised him to get in contact with the nurse on the floor for help and he refused. Please call him back @ [phone] room [phone] A. Thanks, Carissa

## 2020-03-10 NOTE — PROCEDURES
TXP SACHI INTERROGATIONS 3/9/2020 3/9/2020 3/9/2020 3/9/2020 2/27/2020 1/29/2020 1/20/2020   Type - - - - HeartMate II HeartMate II -   Flow - - - - 6.2 5 -   Speed - - - - 14801 07633 -   PI - - - - 4.0 3.9 -   Power (Jackson) - - - - 7.2 6.4 -   LSL - - - - 9600 9600 -   Pulsatility No Pulse No Pulse No Pulse No Pulse Intermittent pulse - Pulse   }

## 2020-03-10 NOTE — PLAN OF CARE
Pt free of falls/trauma/injuries.  Denies c/o SOB; O2Sats remain stable on room air.  Incentive spirometry encouraged throughout shift.  Incisional pain managed with PO analgesics.  Generalized skin remains CDI; mild edema noted to BLEs.  Incisions remain CARY, CDI.  Pt being diuresed with torsemide ; diuresing well.   Wt trending down.  Electrolytes replaced as ordered. pt able to ambulate in hallway with standby assist.  Plan to continue with post-op care.  Pressure bandage reapplied to left chest wall by doctor. Bruising noted to area. Pt denies any pain at this time.  Pt tolerating plan of care.

## 2020-03-10 NOTE — PLAN OF CARE
Seen and assessed at bedside.   Some pain at ICD site but controlled.   No fever or chills.     Pressure dressing removed.   Silver surgical dressing intact and without evidence of soiling.   Ecchymosis noted. Small hematoma noted.     Sling to left arm - wear for 24 hours, then only at night for 6 weeks.  NO HEPARIN PRODUCTS  Keflex 500 mg TID for 5 days at discharge  No lifting left elbow above shoulder height  No lifting over 5 pounds  No driving for 1 week and for 4 weeks if patient uses left arm to make turns  Patient may shower in 24 hours, do not let beam of shower hit site directly and no scrubbing in area  Follow up in device clinic in 1 week and with Dr Yun in 4 months.  The dressing you have on your wound is to stay intact and in place until you see our nurses for a wound check in 1 week.  This dressing can get wet so you can shower tonight.  Your paper instructions may mention to remove the dressing tomorrow, however that is in reference to a different type of bandage.  This bandage is unique and helps prevent infection and should stay on until your wound check.    From EP standpoint, we would like to watch him until the afternoon. We will reassess small pocket hematoma this afteroon.   HTS team to see and evaluate. Discharge pending their evaluation.     Daren Sanchez MD PGY7

## 2020-03-10 NOTE — ASSESSMENT & PLAN NOTE
- S/P Medtronic BiV ICD 2014 with h/o multiple ICD shocks  - S/P ICD gen change (BiV -> dual chamber ICD) and addition of new RV/ICD lead along with DFT's 3/9 (Dr. Yun)  - Has small pocket hematoa today, and EP wants to watch until at least this afternoon  - NO HEPARIN PRODUCTS  - Keflex 500 mg TID for 5 days at discharge

## 2020-03-10 NOTE — PROGRESS NOTES
Subjective:   Patient ID:  Tim Richards is a 54 y.o. male who presents for LVAD followup visit.    Implant Date: 3/8/2018       Heartmate II RPM [id number]  3/9/18 outflow graft changed     INR goal: 2-3   NO Bridge with heparin    TXP SACHI INTERROGATIONS 3/9/2020   Type -   Flow -   Speed -   PI -   Power ([place]) -   LSL -   Pulsatility No Pulse       HPI   Mr. Richards is a 55 yo AAM with DCM, HBP, CHF stage D s/p HM 2 comes for routine visit. He was recently discharged from our service after being treated for severe hyperthyroidism, having missed his prednisone for a few days and subsequently having multiple VT requiring ICD shocks, that did not capture. Found to have fractured lead, however due to extreme level of hyperthyroidism, felt safer to wait for his thyroid to level out before proceeding with lead replacement. He is due to return next week for this. Feels well, has gained a lot of weight on the prednisone, but is taking his medicaitons and has not missed anything. Additionally he does experience anecdotal palpitations, however not spoken to EP about it necessarily. Patient denies N/V/F/C, lightheadedness, dizziness, PND, orthopnea, LE edema, abdominal pain, abdominal pressure, chest pain, chest pressure, syncope, pre-syncope. Additionally patient has no bleeding, no bright red blood per rectum, melena, no GI symptoms at all.        Review of Systems   Constitution: Negative. Negative for chills, decreased appetite, diaphoresis, fever, malaise/fatigue, night sweats, weight gain and weight loss.   HENT: Negative for congestion.    Eyes: Negative.  Negative for blurred vision and visual disturbance.   Cardiovascular: Negative for chest pain, claudication, cyanosis, dyspnea on exertion, irregular heartbeat, leg swelling, near-syncope, orthopnea, palpitations, paroxysmal nocturnal dyspnea and syncope.   Respiratory: Negative for cough, hemoptysis, shortness of breath, sleep disturbances due to breathing, snoring  "and wheezing.    Endocrine: Negative.    Hematologic/Lymphatic: Negative.  Negative for bleeding problem. Does not bruise/bleed easily.   Skin: Negative for color change, dry skin, nail changes, poor wound healing and rash.   Musculoskeletal: Negative.  Negative for arthritis, joint pain and muscle weakness.   Gastrointestinal: Negative.  Negative for bloating, abdominal pain, anorexia, constipation, diarrhea, hematemesis, hematochezia, melena, nausea and vomiting.   Genitourinary: Negative.  Negative for frequency and urgency.   Neurological: Negative for difficulty with concentration, excessive daytime sleepiness, dizziness, headaches, light-headedness and weakness.   Psychiatric/Behavioral: Negative for depression. The patient does not have insomnia and is not nervous/anxious.        Objective:   Blood pressure (!) 80/0, pulse 84, height 6' 1" (1.854 m), weight 121.1 kg (267 lb).body mass index is 35.23 kg/m².    Physical Exam   Constitutional: He is oriented to person, place, and time. He appears well-developed and well-nourished. No distress.   HENT:   Head: Normocephalic and atraumatic.   Nose: Nose normal.   Mouth/Throat: Oropharynx is clear and moist. No oropharyngeal exudate.   Eyes: Pupils are equal, round, and reactive to light. Conjunctivae and EOM are normal. No scleral icterus.   Neck: Normal range of motion. Neck supple. No JVD present. Carotid bruit is not present. No tracheal deviation present. No thyromegaly present.   Cardiovascular: Normal rate, regular rhythm, S1 normal, S2 normal and normal heart sounds. Exam reveals no gallop and no friction rub.   No murmur heard.  Smooth VAD hum. DLES is "1"   Pulmonary/Chest: Effort normal and breath sounds normal. No respiratory distress. He has no wheezes. He has no rales. He exhibits no tenderness.   Abdominal: Soft. Bowel sounds are normal. He exhibits no distension and no mass. There is no tenderness. There is no rebound and no guarding. "   Musculoskeletal: Normal range of motion. He exhibits no edema or tenderness.   Neurological: He is alert and oriented to person, place, and time. Coordination normal.   Skin: Skin is warm and dry. No rash noted. He is not diaphoretic. No erythema. No pallor.   Psychiatric: He has a normal mood and affect. His behavior is normal. Judgment and thought content normal.   Nursing note and vitals reviewed.      Lab Results   Component Value Date    WBC 8.50 03/02/2020    HGB 12.9 (L) 03/02/2020    HCT 40.1 03/02/2020    MCV 82 03/02/2020     03/02/2020    CO2 27 03/02/2020    CREATININE 1.7 (H) 03/02/2020    CALCIUM 9.5 03/02/2020    ALBUMIN 4.0 03/02/2020    AST 32 03/02/2020     (H) 03/02/2020    ALT 36 03/02/2020     (H) 03/02/2020       Lab Results   Component Value Date    INR 2.3 (H) 03/09/2020    INR 3.3 (H) 03/06/2020    INR 3.6 (H) 03/02/2020       BNP   Date Value Ref Range Status   03/02/2020 596 (H) 0 - 99 pg/mL Final     Comment:     Values of less than 100 pg/ml are consistent with non-CHF populations.   02/27/2020 510 (H) 0 - 99 pg/mL Final     Comment:     Values of less than 100 pg/ml are consistent with non-CHF populations.   02/10/2020 371 (H) 0 - 99 pg/mL Final     Comment:     Values of less than 100 pg/ml are consistent with non-CHF populations.       LD   Date Value Ref Range Status   03/02/2020 358 (H) 84 - 195 U/L Final     Comment:     Results are increased in hemolyzed samples.   02/27/2020 503 (H) 110 - 260 U/L Final     Comment:     Results are increased in hemolyzed samples.   02/10/2020 363 (H) 84 - 195 U/L Final     Comment:     Results are increased in hemolyzed samples.       Assessment:      1. Heart replaced by heart assist device    2. Chronic combined systolic and diastolic heart failure    3. Long term (current) use of anticoagulants    4. CKD (chronic kidney disease), stage III    5. NICM (nonischemic cardiomyopathy)    6. Hypertensive cardiovascular-renal  disease, stage 1-4 or unspecified chronic kidney disease, with heart failure    7. Hyperthyroidism        Plan:   Patient seems to be doing okay since discharge, due to get lead replaced next week.   Will go ahead and reduce pump speed to 9800rpm, down by 200, to see if he feels better or less palpitations by any chance. Not certain I fully know when/how/why he was increased to 02124, and patient is not certain either, but he would greatly like to try to reduce speed a little and see how he does.     Listed for transplant: Not listed. Implanted as DT    UNOS Patient Status  Functional Status: 90% - Able to carry on normal activity: minor symptoms of disease  Physical Capacity: No Limitations  Working for Income: yes  If yes, working activity level: Working Full Time

## 2020-03-10 NOTE — DISCHARGE INSTRUCTIONS
Sling to left arm - wear for 24 hours, then only at night for 6 weeks.  NO HEPARIN PRODUCTS  Keflex 500 mg TID for 5 days at discharge  No lifting left elbow above shoulder height  No lifting over 5 pounds  No driving for 1 week and for 4 weeks if patient uses left arm to make turns  Patient may shower in 24 hours, do not let beam of shower hit site directly and no scrubbing in area  Follow up in device clinic in 1 week and with Dr Yun in 4 months.  The dressing you have on your wound is to stay intact and in place until you see our nurses for a wound check in 1 week.  This dressing can get wet so you can shower tonight.  Your paper instructions may mention to remove the dressing tomorrow, however that is in reference to a different type of bandage.  This bandage is unique and helps prevent infection and should stay on until your wound check.

## 2020-03-10 NOTE — PROGRESS NOTES
03/10/2020  Fitz Christianson    Current provider:  Guerda Bautista MD      I, Fitz Christianson, rounded on Tim Richards to ensure all mechanical assist device settings (IABP or VAD) were appropriate and all parameters were within limits.  I was able to ensure all back up equipment was present, the staff had no issues, and the Perfusion Department daily rounding was complete.    10:25 AM

## 2020-03-10 NOTE — PLAN OF CARE
Plan of care discussed with patient. Patient is free of fall/trauma/injury. Monitor showing SR with conduction defect. Tylenol and tramadol given for c/o left ant CW incisional pain. LVAD numbers WNL. Only PI events noted. Doppler's 90-96. Pt took own home meds last night per wife. Left ant CW dressing intact and arm sling in place. Instructed pt not to lift arm up or out. Uneventful night shift.  All questions addressed.

## 2020-03-10 NOTE — SUBJECTIVE & OBJECTIVE
**Interval History: No complaints this morning. EP wants to watch until at least this afternoon given small pocket hematoma. INR is 2.4    Continuous Infusions:  Scheduled Meds:   allopurinoL  100 mg Oral Daily    amiodarone  400 mg Oral Daily    amLODIPine  10 mg Oral Daily    cephALEXin  500 mg Oral Q8H    doxazosin  8 mg Oral QHS    methIMAzole  20 mg Oral BID    pantoprazole  40 mg Oral Daily    predniSONE  30 mg Oral Once    predniSONE  60 mg Oral Daily    torsemide  40 mg Oral Daily    warfarin  5 mg Oral Daily     PRN Meds:acetaminophen, senna-docusate 8.6-50 mg, traMADoL, zolpidem    Review of patient's allergies indicates:   Allergen Reactions    Lisinopril Anaphylaxis    Hydralazine analogues      Chronic constipation, impotence, dizziness     Objective:     Vital Signs (Most Recent):  Temp: 98.2 °F (36.8 °C) (03/10/20 0752)  Pulse: 95 (03/10/20 0752)  Resp: 20 (03/10/20 0752)  BP: (!) 88/0 (03/10/20 0752)  SpO2: 95 % (03/10/20 0752) Vital Signs (24h Range):  Temp:  [96.6 °F (35.9 °C)-98.5 °F (36.9 °C)] 98.2 °F (36.8 °C)  Pulse:  [0-98] 95  Resp:  [18-20] 20  SpO2:  [93 %-100 %] 95 %  BP: ()/(0-99) 88/0  Arterial Line BP: ()/(75-92) 89/78     Patient Vitals for the past 72 hrs (Last 3 readings):   Weight   03/10/20 0732 121.9 kg (268 lb 11.9 oz)   03/09/20 1512 123.4 kg (272 lb 0.8 oz)   03/09/20 0744 120.2 kg (265 lb)     Body mass index is 35.46 kg/m².      Intake/Output Summary (Last 24 hours) at 3/10/2020 1059  Last data filed at 3/10/2020 0900  Gross per 24 hour   Intake 3070 ml   Output 4600 ml   Net -1530 ml       Hemodynamic Parameters:       Telemetry: SR - ST    Physical Exam   Constitutional: He is oriented to person, place, and time. He appears well-developed and well-nourished.   HENT:   Head: Normocephalic and atraumatic.   Eyes: Pupils are equal, round, and reactive to light. EOM are normal.   Neck: Normal range of motion. Neck supple. No JVD present. No thyromegaly  present.   Cardiovascular: Normal rate and regular rhythm.   Smooth VAD hum   Pulmonary/Chest: Effort normal and breath sounds normal.   Abdominal: Soft. Bowel sounds are normal.   Musculoskeletal: Normal range of motion. He exhibits no edema.   Neurological: He is alert and oriented to person, place, and time.   Skin: Skin is warm and dry. Capillary refill takes 2 to 3 seconds.   Psychiatric: He has a normal mood and affect. His behavior is normal. Judgment and thought content normal.       Significant Labs:  CBC:  Recent Labs   Lab 03/10/20  0453   WBC 9.76   RBC 4.72   HGB 12.4*   HCT 42.1      MCV 89   MCH 26.3*   MCHC 29.5*     BNP:  No results for input(s): BNP in the last 168 hours.    Invalid input(s): BNPTRIAGELBLO  CMP:  Recent Labs   Lab 03/10/20  0453   *   CALCIUM 8.9      K 3.9   CO2 30*      BUN 28*   CREATININE 1.5*      Coagulation:   Recent Labs   Lab 03/06/20  1128 03/09/20  0731 03/10/20  0453   INR 3.3* 2.3* 2.4*   APTT  --  34.2*  --      LDH:  Recent Labs   Lab 03/10/20  0453   *     Microbiology:  Microbiology Results (last 7 days)     ** No results found for the last 168 hours. **          I have reviewed all pertinent labs within the past 24 hours.    Estimated Creatinine Clearance: 77.9 mL/min (A) (based on SCr of 1.5 mg/dL (H)).    Diagnostic Results:  I have reviewed all pertinent imaging results/findings within the past 24 hours.

## 2020-03-10 NOTE — PROGRESS NOTES
[facility]  Heart Transplant  Progress Note    Patient Name: Tim Richards  MRN: [id number]  Admission Date: 3/9/2020  Hospital Length of Stay: 0 days  Attending Physician: Guerda Bautista MD  Primary Care Provider: Power Connors MD  Principal Problem:<principal problem not specified>    Subjective:     **Interval History: No complaints this morning. EP wants to watch until at least this afternoon given small pocket hematoma. INR is 2.4    Continuous Infusions:  Scheduled Meds:   allopurinoL  100 mg Oral Daily    amiodarone  400 mg Oral Daily    amLODIPine  10 mg Oral Daily    cephALEXin  500 mg Oral Q8H    doxazosin  8 mg Oral QHS    methIMAzole  20 mg Oral BID    pantoprazole  40 mg Oral Daily    predniSONE  30 mg Oral Once    predniSONE  60 mg Oral Daily    torsemide  40 mg Oral Daily    warfarin  5 mg Oral Daily     PRN Meds:acetaminophen, senna-docusate 8.6-50 mg, traMADoL, zolpidem    Review of patient's allergies indicates:   Allergen Reactions    Lisinopril Anaphylaxis    Hydralazine analogues      Chronic constipation, impotence, dizziness     Objective:     Vital Signs (Most Recent):  Temp: 98.2 °F (36.8 °C) (03/10/20 0752)  Pulse: 95 (03/10/20 0752)  Resp: 20 (03/10/20 0752)  BP: (!) 88/0 (03/10/20 0752)  SpO2: 95 % (03/10/20 0752) Vital Signs (24h Range):  Temp:  [96.6 °F (35.9 °C)-98.5 °F (36.9 °C)] 98.2 °F (36.8 °C)  Pulse:  [0-98] 95  Resp:  [18-20] 20  SpO2:  [93 %-100 %] 95 %  BP: ()/(0-99) 88/0  Arterial Line BP: ()/(75-92) 89/78     Patient Vitals for the past 72 hrs (Last 3 readings):   Weight   03/10/20 0732 121.9 kg (268 lb 11.9 oz)   03/09/20 1512 123.4 kg (272 lb 0.8 oz)   03/09/20 0744 120.2 kg (265 lb)     Body mass index is 35.46 kg/m².      Intake/Output Summary (Last 24 hours) at 3/10/2020 1059  Last data filed at 3/10/2020 0900  Gross per 24 hour   Intake 3070 ml   Output 4600 ml   Net -1530 ml       Hemodynamic Parameters:        Telemetry: SR - ST    Physical Exam   Constitutional: He is oriented to person, place, and time. He appears well-developed and well-nourished.   HENT:   Head: Normocephalic and atraumatic.   Eyes: Pupils are equal, round, and reactive to light. EOM are normal.   Neck: Normal range of motion. Neck supple. No JVD present. No thyromegaly present.   Cardiovascular: Normal rate and regular rhythm.   Smooth VAD hum   Pulmonary/Chest: Effort normal and breath sounds normal.   Abdominal: Soft. Bowel sounds are normal.   Musculoskeletal: Normal range of motion. He exhibits no edema.   Neurological: He is alert and oriented to person, place, and time.   Skin: Skin is warm and dry. Capillary refill takes 2 to 3 seconds.   Psychiatric: He has a normal mood and affect. His behavior is normal. Judgment and thought content normal.       Significant Labs:  CBC:  Recent Labs   Lab 03/10/20  0453   WBC 9.76   RBC 4.72   HGB 12.4*   HCT 42.1      MCV 89   MCH 26.3*   MCHC 29.5*     BNP:  No results for input(s): BNP in the last 168 hours.    Invalid input(s): BNPTRIAGELBLO  CMP:  Recent Labs   Lab 03/10/20  0453   *   CALCIUM 8.9      K 3.9   CO2 30*      BUN 28*   CREATININE 1.5*      Coagulation:   Recent Labs   Lab 03/06/20  1128 03/09/20  0731 03/10/20  0453   INR 3.3* 2.3* 2.4*   APTT  --  34.2*  --      LDH:  Recent Labs   Lab 03/10/20  0453   *     Microbiology:  Microbiology Results (last 7 days)     ** No results found for the last 168 hours. **          I have reviewed all pertinent labs within the past 24 hours.    Estimated Creatinine Clearance: 77.9 mL/min (A) (based on SCr of 1.5 mg/dL (H)).    Diagnostic Results:  I have reviewed all pertinent imaging results/findings within the past 24 hours.    Assessment and Plan:     Tim Richards is a 54 y.o. male with DCM EF 10% s/p LVAD (DT as of now), HTN, and VT/VF s/p medtronic crt-d (2014, Dr Chiu) who presents to [facility] for outpatient  "generator change (device at [facility]) and placement of new ICD lead. He currently has a single coil ICD lead. He previously had sustained VT and VF which failed multiple shocks from the device and required external defibrillation for rescue. He was in decompensated heart failure at that time. Once he was compensated, DFTs were performed which were successful. He has no fever or chills. He has been feeling subjectively "pretty good" for the last couple of days per him.      From Dr Wagner's clinic note 12/31/19:  "Tim Richards 55 yo AAM with DCM,(EF 10%, grade III DD) s/p HM II with Outflow graft changed (03/9/18) as DT, BiV-Icd Medtronic (2014), HtN, obesity comes in the Ep clinic for follow up of VT and ICD management.  He was admitted to the hospital in 10/2019 with ICD shocks. When he came to the ER he was noted to be in MMVT. He had multiple rounds of ATP that degenerated the MMVT into VF and the he was unsuccessfully shocked multiple times . He was eventually shocked externally to convert into sinus rhythm .  It was postulated reasons for ineffective therapy (had worsening hypervolemia week prior, length of time in the arrhythmia prior to ICD shock, IV amiodarone causing higher defibrillatory tissue threshold, or change in heart-shock vector post LVAD implant). He had a NIPS which showed successful DFT at 35J. He was discharge after being diuresed. Since discharge, he has done well clinically .   Device check noted on 12/14/19 - one episode of MMVT 270 ms that was appropriately successfully shocked. He has had multiple non sustained slower VT. Of note is he had a monitoring zone added during his admission at 133. VT 1 zone at 167 does not have ATp and goes directly to 35J shock. We will add 6 rounds of ATP before shock in that zone. (refer to device interrogation in media). Device longevity - 4 months to [facility]."        AICD malfunction  - S/P Medtronic BiV ICD 2014 with h/o multiple ICD shocks  - S/P ICD gen " change (BiV -> dual chamber ICD) and addition of new RV/ICD lead along with DFT's 3/9 (Dr. Yun)  - Has small pocket hematoa today, and EP wants to watch until at least this afternoon  - NO HEPARIN PRODUCTS  - Keflex 500 mg TID for 5 days at discharge    LVAD (left ventricular assist device) present  - S/P DT HM 2 3/8/18 with outflow graft exchange 3/9/18 (Dr. Kaufman)  - Current speed 9800 (decreased at clinic visit 2/27 with plan to repeat echo 3/26  - INR goal 2.0 - 3.0. INR is 2.4 today - continue Coumadin  - LDH stable today at 434 (baseline seems to be 300's - 500's  - Doppler goal is 60-80. Dopplers have been as high as 96. Continue Norvasc and Doxazosin. Has ACE and Hydralazine allergies. Will monitor for now    Procedure: Device Interrogation Including analysis of device parameters  Current Settings: Ventricular Assist Device  Review of device function is stable/unstable     TXP LVAD INTERROGATIONS 3/10/2020 3/10/2020 3/9/2020 3/9/2020 3/9/2020 3/9/2020 3/9/2020   Type HeartMate II HeartMate II HeartMate II HeartMate II HeartMate II HeartMate II HeartMate II   Flow 5.5 5.1 5.7 5.8 5.2 4.5 4.5   Speed 9800 9800 9800 9800 9800 9800 9800   PI 4.4 5.0 3.7 4.1 3.1 4.7 4.7   Power ([place]) 6.4 6.2 6.6 6.8 6.2 5.9 5.9   LSL - - - - - - -   Pulsatility No Pulse No Pulse No Pulse No Pulse - No Pulse [place] [id number]  Heart Transplant  [facility]

## 2020-03-10 NOTE — ASSESSMENT & PLAN NOTE
- S/P DT HM 2 3/8/18 with outflow graft exchange 3/9/18 (Dr. Kaufman)  - Current speed 9800 (decreased at clinic visit 2/27 with plan to repeat echo 3/26  - INR goal 2.0 - 3.0. INR is 2.4 today - continue Coumadin  - LDH stable today at 434 (baseline seems to be 300's - 500's  - Doppler goal is 60-80. Dopplers have been as high as 96. Continue Norvasc and Doxazosin. Has ACE and Hydralazine allergies. Will monitor for now    Procedure: Device Interrogation Including analysis of device parameters  Current Settings: Ventricular Assist Device  Review of device function is stable/unstable     TXP LVAD INTERROGATIONS 3/10/2020 3/10/2020 3/9/2020 3/9/2020 3/9/2020 3/9/2020 3/9/2020   Type HeartMate II HeartMate II HeartMate II HeartMate II HeartMate II HeartMate II HeartMate II   Flow 5.5 5.1 5.7 5.8 5.2 4.5 4.5   Speed 9800 9800 9800 9800 9800 9800 9800   PI 4.4 5.0 3.7 4.1 3.1 4.7 4.7   Power ([place]) 6.4 6.2 6.6 6.8 6.2 5.9 5.9   LSL - - - - - - -   Pulsatility No Pulse No Pulse No Pulse No Pulse - No Pulse No Pulse

## 2020-03-11 LAB
ADENOVIRUS: NOT DETECTED
ALBUMIN SERPL BCP-MCNC: 3.5 G/DL (ref 3.5–5.2)
ALP SERPL-CCNC: 92 U/L (ref 55–135)
ALT SERPL W/O P-5'-P-CCNC: 22 U/L (ref 10–44)
ANION GAP SERPL CALC-SCNC: 15 MMOL/L (ref 8–16)
AST SERPL-CCNC: 31 U/L (ref 10–40)
BACTERIA #/AREA URNS AUTO: ABNORMAL /HPF
BASOPHILS # BLD AUTO: 0.02 K/UL (ref 0–0.2)
BASOPHILS NFR BLD: 0.2 % (ref 0–1.9)
BILIRUB DIRECT SERPL-MCNC: 0.3 MG/DL (ref 0.1–0.3)
BILIRUB SERPL-MCNC: 0.5 MG/DL (ref 0.1–1)
BILIRUB UR QL STRIP: NEGATIVE
BNP SERPL-MCNC: 391 PG/ML (ref 0–99)
BORDETELLA PARAPERTUSSIS (IS1001): NOT DETECTED
BORDETELLA PERTUSSIS (PTXP): NOT DETECTED
BUN SERPL-MCNC: 27 MG/DL (ref 6–20)
CALCIUM SERPL-MCNC: 9.8 MG/DL (ref 8.7–10.5)
CHLAMYDIA PNEUMONIAE: NOT DETECTED
CHLORIDE SERPL-SCNC: 99 MMOL/L (ref 95–110)
CLARITY UR REFRACT.AUTO: ABNORMAL
CO2 SERPL-SCNC: 27 MMOL/L (ref 23–29)
COLOR UR AUTO: YELLOW
CORONAVIRUS 229E, COMMON COLD VIRUS: NOT DETECTED
CORONAVIRUS HKU1, COMMON COLD VIRUS: NOT DETECTED
CORONAVIRUS NL63, COMMON COLD VIRUS: NOT DETECTED
CORONAVIRUS OC43, COMMON COLD VIRUS: NOT DETECTED
CREAT SERPL-MCNC: 1.6 MG/DL (ref 0.5–1.4)
CRP SERPL-MCNC: 50.7 MG/L (ref 0–8.2)
DIFFERENTIAL METHOD: ABNORMAL
EOSINOPHIL # BLD AUTO: 0 K/UL (ref 0–0.5)
EOSINOPHIL NFR BLD: 0 % (ref 0–8)
ERYTHROCYTE [DISTWIDTH] IN BLOOD BY AUTOMATED COUNT: 16.4 % (ref 11.5–14.5)
EST. GFR  (AFRICAN AMERICAN): 56 ML/MIN/1.73 M^2
EST. GFR  (NON AFRICAN AMERICAN): 48.5 ML/MIN/1.73 M^2
FLUBV RNA NPH QL NAA+NON-PROBE: NOT DETECTED
GLUCOSE SERPL-MCNC: 86 MG/DL (ref 70–110)
GLUCOSE SERPL-MCNC: 90 MG/DL (ref 70–110)
GLUCOSE UR QL STRIP: NEGATIVE
HCO3 UR-SCNC: 32.7 MMOL/L (ref 24–28)
HCT VFR BLD AUTO: 45.3 % (ref 40–54)
HCT VFR BLD CALC: 35 %PCV (ref 36–54)
HGB BLD-MCNC: 13.2 G/DL (ref 14–18)
HGB UR QL STRIP: ABNORMAL
HPIV1 RNA NPH QL NAA+NON-PROBE: NOT DETECTED
HPIV2 RNA NPH QL NAA+NON-PROBE: NOT DETECTED
HPIV3 RNA NPH QL NAA+NON-PROBE: NOT DETECTED
HPIV4 RNA NPH QL NAA+NON-PROBE: NOT DETECTED
HUMAN METAPNEUMOVIRUS: NOT DETECTED
HYALINE CASTS UR QL AUTO: 16 /LPF
IMM GRANULOCYTES # BLD AUTO: 0.11 K/UL (ref 0–0.04)
IMM GRANULOCYTES NFR BLD AUTO: 0.9 % (ref 0–0.5)
INFLUENZA A (SUBTYPES H1,H1-2009,H3): ABNORMAL
INR PPP: 2.8 (ref 0.8–1.2)
KETONES UR QL STRIP: NEGATIVE
LACTATE SERPL-SCNC: 1.8 MMOL/L (ref 0.5–2.2)
LACTATE SERPL-SCNC: 2.3 MMOL/L (ref 0.5–2.2)
LACTATE SERPL-SCNC: 2.5 MMOL/L (ref 0.5–2.2)
LDH SERPL L TO P-CCNC: 529 U/L (ref 110–260)
LEUKOCYTE ESTERASE UR QL STRIP: NEGATIVE
LYMPHOCYTES # BLD AUTO: 0.6 K/UL (ref 1–4.8)
LYMPHOCYTES NFR BLD: 5.2 % (ref 18–48)
MAGNESIUM SERPL-MCNC: 1.8 MG/DL (ref 1.6–2.6)
MCH RBC QN AUTO: 25.7 PG (ref 27–31)
MCHC RBC AUTO-ENTMCNC: 29.1 G/DL (ref 32–36)
MCV RBC AUTO: 88 FL (ref 82–98)
MICROSCOPIC COMMENT: ABNORMAL
MONOCYTES # BLD AUTO: 1.2 K/UL (ref 0.3–1)
MONOCYTES NFR BLD: 10.5 % (ref 4–15)
MYCOPLASMA PNEUMONIAE: NOT DETECTED
NEUTROPHILS # BLD AUTO: 9.9 K/UL (ref 1.8–7.7)
NEUTROPHILS NFR BLD: 83.2 % (ref 38–73)
NITRITE UR QL STRIP: NEGATIVE
NRBC BLD-RTO: 0 /100 WBC
PCO2 BLDA: 50.3 MMHG (ref 35–45)
PH SMN: 7.42 [PH] (ref 7.35–7.45)
PH UR STRIP: 5 [PH] (ref 5–8)
PHOSPHATE SERPL-MCNC: 3.1 MG/DL (ref 2.7–4.5)
PLATELET # BLD AUTO: 193 K/UL (ref 150–350)
PMV BLD AUTO: 10.9 FL (ref 9.2–12.9)
PO2 BLDA: 233 MMHG (ref 80–100)
POC BE: 8 MMOL/L
POC IONIZED CALCIUM: 1.22 MMOL/L (ref 1.06–1.42)
POC SATURATED O2: 100 % (ref 95–100)
POC TCO2: 34 MMOL/L (ref 23–27)
POTASSIUM BLD-SCNC: 3.6 MMOL/L (ref 3.5–5.1)
POTASSIUM SERPL-SCNC: 3.9 MMOL/L (ref 3.5–5.1)
PREALB SERPL-MCNC: 31 MG/DL (ref 20–43)
PROCALCITONIN SERPL IA-MCNC: 0.06 NG/ML
PROT SERPL-MCNC: 6.9 G/DL (ref 6–8.4)
PROT UR QL STRIP: NEGATIVE
PROTHROMBIN TIME: 26.4 SEC (ref 9–12.5)
RBC # BLD AUTO: 5.14 M/UL (ref 4.6–6.2)
RBC #/AREA URNS AUTO: 2 /HPF (ref 0–4)
RESPIRATORY INFECTION PANEL SOURCE: ABNORMAL
RSV RNA NPH QL NAA+NON-PROBE: NOT DETECTED
RV+EV RNA NPH QL NAA+NON-PROBE: NOT DETECTED
SAMPLE: ABNORMAL
SODIUM BLD-SCNC: 139 MMOL/L (ref 136–145)
SODIUM SERPL-SCNC: 141 MMOL/L (ref 136–145)
SP GR UR STRIP: 1.02 (ref 1–1.03)
T4 FREE SERPL-MCNC: 1.73 NG/DL (ref 0.71–1.51)
TSH SERPL DL<=0.005 MIU/L-ACNC: 0.02 UIU/ML (ref 0.4–4)
URN SPEC COLLECT METH UR: ABNORMAL
WBC # BLD AUTO: 11.84 K/UL (ref 3.9–12.7)
WBC #/AREA URNS AUTO: 2 /HPF (ref 0–5)

## 2020-03-11 PROCEDURE — 84134 ASSAY OF PREALBUMIN: CPT

## 2020-03-11 PROCEDURE — 99222 PR INITIAL HOSPITAL CARE,LEVL II: ICD-10-PCS | Mod: ,,, | Performed by: INTERNAL MEDICINE

## 2020-03-11 PROCEDURE — 25000003 PHARM REV CODE 250: Performed by: STUDENT IN AN ORGANIZED HEALTH CARE EDUCATION/TRAINING PROGRAM

## 2020-03-11 PROCEDURE — 99255 PR INITIAL INPATIENT CONSULT,LEVL V: ICD-10-PCS | Mod: ,,, | Performed by: INTERNAL MEDICINE

## 2020-03-11 PROCEDURE — 25000003 PHARM REV CODE 250: Performed by: INTERNAL MEDICINE

## 2020-03-11 PROCEDURE — 93750 PR INTERROGATE VENT ASSIST DEV, IN PERSON, W PHYSICIAN ANALYSIS: ICD-10-PCS | Mod: ,,, | Performed by: INTERNAL MEDICINE

## 2020-03-11 PROCEDURE — 25000003 PHARM REV CODE 250: Performed by: PHYSICIAN ASSISTANT

## 2020-03-11 PROCEDURE — 80048 BASIC METABOLIC PNL TOTAL CA: CPT

## 2020-03-11 PROCEDURE — 36415 COLL VENOUS BLD VENIPUNCTURE: CPT

## 2020-03-11 PROCEDURE — 27000248 HC VAD-ADDITIONAL DAY

## 2020-03-11 PROCEDURE — 99222 1ST HOSP IP/OBS MODERATE 55: CPT | Mod: ,,, | Performed by: INTERNAL MEDICINE

## 2020-03-11 PROCEDURE — 99232 PR SUBSEQUENT HOSPITAL CARE,LEVL II: ICD-10-PCS | Mod: ,,, | Performed by: INTERNAL MEDICINE

## 2020-03-11 PROCEDURE — 83880 ASSAY OF NATRIURETIC PEPTIDE: CPT

## 2020-03-11 PROCEDURE — 87040 BLOOD CULTURE FOR BACTERIA: CPT

## 2020-03-11 PROCEDURE — 86140 C-REACTIVE PROTEIN: CPT

## 2020-03-11 PROCEDURE — 87798 DETECT AGENT NOS DNA AMP: CPT

## 2020-03-11 PROCEDURE — 80076 HEPATIC FUNCTION PANEL: CPT

## 2020-03-11 PROCEDURE — 84145 PROCALCITONIN (PCT): CPT

## 2020-03-11 PROCEDURE — 20600001 HC STEP DOWN PRIVATE ROOM

## 2020-03-11 PROCEDURE — 99255 IP/OBS CONSLTJ NEW/EST HI 80: CPT | Mod: ,,, | Performed by: INTERNAL MEDICINE

## 2020-03-11 PROCEDURE — 85025 COMPLETE CBC W/AUTO DIFF WBC: CPT

## 2020-03-11 PROCEDURE — 63600175 PHARM REV CODE 636 W HCPCS: Performed by: STUDENT IN AN ORGANIZED HEALTH CARE EDUCATION/TRAINING PROGRAM

## 2020-03-11 PROCEDURE — 81001 URINALYSIS AUTO W/SCOPE: CPT

## 2020-03-11 PROCEDURE — 99233 SBSQ HOSP IP/OBS HIGH 50: CPT | Mod: 25,,, | Performed by: INTERNAL MEDICINE

## 2020-03-11 PROCEDURE — 99232 SBSQ HOSP IP/OBS MODERATE 35: CPT | Mod: ,,, | Performed by: INTERNAL MEDICINE

## 2020-03-11 PROCEDURE — 85610 PROTHROMBIN TIME: CPT

## 2020-03-11 PROCEDURE — 83615 LACTATE (LD) (LDH) ENZYME: CPT

## 2020-03-11 PROCEDURE — 63600175 PHARM REV CODE 636 W HCPCS: Performed by: INTERNAL MEDICINE

## 2020-03-11 PROCEDURE — 99233 PR SUBSEQUENT HOSPITAL CARE,LEVL III: ICD-10-PCS | Mod: 25,,, | Performed by: INTERNAL MEDICINE

## 2020-03-11 PROCEDURE — 83605 ASSAY OF LACTIC ACID: CPT

## 2020-03-11 PROCEDURE — 25000242 PHARM REV CODE 250 ALT 637 W/ HCPCS: Performed by: PHYSICIAN ASSISTANT

## 2020-03-11 PROCEDURE — 84443 ASSAY THYROID STIM HORMONE: CPT

## 2020-03-11 PROCEDURE — 94761 N-INVAS EAR/PLS OXIMETRY MLT: CPT

## 2020-03-11 PROCEDURE — 84439 ASSAY OF FREE THYROXINE: CPT

## 2020-03-11 PROCEDURE — 84100 ASSAY OF PHOSPHORUS: CPT

## 2020-03-11 PROCEDURE — 83735 ASSAY OF MAGNESIUM: CPT

## 2020-03-11 PROCEDURE — 83605 ASSAY OF LACTIC ACID: CPT | Mod: 91

## 2020-03-11 PROCEDURE — 93750 INTERROGATION VAD IN PERSON: CPT | Mod: ,,, | Performed by: INTERNAL MEDICINE

## 2020-03-11 RX ORDER — CEFAZOLIN SODIUM 1 G/3ML
1 INJECTION, POWDER, FOR SOLUTION INTRAMUSCULAR; INTRAVENOUS
Status: DISCONTINUED | OUTPATIENT
Start: 2020-03-11 | End: 2020-03-11

## 2020-03-11 RX ORDER — OSELTAMIVIR PHOSPHATE 75 MG/1
75 CAPSULE ORAL 2 TIMES DAILY
Status: DISCONTINUED | OUTPATIENT
Start: 2020-03-11 | End: 2020-03-13 | Stop reason: HOSPADM

## 2020-03-11 RX ORDER — ISOSORBIDE DINITRATE 10 MG/1
10 TABLET ORAL 3 TIMES DAILY
Status: DISCONTINUED | OUTPATIENT
Start: 2020-03-11 | End: 2020-03-12

## 2020-03-11 RX ORDER — FLUTICASONE PROPIONATE 50 MCG
2 SPRAY, SUSPENSION (ML) NASAL DAILY
Status: DISCONTINUED | OUTPATIENT
Start: 2020-03-11 | End: 2020-03-13 | Stop reason: HOSPADM

## 2020-03-11 RX ORDER — CEFEPIME HYDROCHLORIDE 2 G/1
2 INJECTION, POWDER, FOR SOLUTION INTRAVENOUS
Status: DISCONTINUED | OUTPATIENT
Start: 2020-03-11 | End: 2020-03-13

## 2020-03-11 RX ADMIN — PANTOPRAZOLE SODIUM 40 MG: 40 TABLET, DELAYED RELEASE ORAL at 09:03

## 2020-03-11 RX ADMIN — ACETAMINOPHEN 650 MG: 325 TABLET ORAL at 09:03

## 2020-03-11 RX ADMIN — ALLOPURINOL 100 MG: 100 TABLET ORAL at 09:03

## 2020-03-11 RX ADMIN — WARFARIN SODIUM 2.5 MG: 1 TABLET ORAL at 05:03

## 2020-03-11 RX ADMIN — AMIODARONE HYDROCHLORIDE 400 MG: 200 TABLET ORAL at 09:03

## 2020-03-11 RX ADMIN — CEFEPIME 2 G: 2 INJECTION, POWDER, FOR SOLUTION INTRAVENOUS at 09:03

## 2020-03-11 RX ADMIN — OSELTAMIVIR PHOSPHATE 75 MG: 75 CAPSULE ORAL at 09:03

## 2020-03-11 RX ADMIN — GUAIFENESIN AND DEXTROMETHORPHAN HYDROBROMIDE 1 TABLET: 600; 30 TABLET, EXTENDED RELEASE ORAL at 11:03

## 2020-03-11 RX ADMIN — ACETAMINOPHEN 650 MG: 325 TABLET ORAL at 04:03

## 2020-03-11 RX ADMIN — AMLODIPINE BESYLATE 10 MG: 10 TABLET ORAL at 09:03

## 2020-03-11 RX ADMIN — ACETAMINOPHEN 650 MG: 325 TABLET ORAL at 10:03

## 2020-03-11 RX ADMIN — DOXAZOSIN 8 MG: 4 TABLET ORAL at 09:03

## 2020-03-11 RX ADMIN — FLUTICASONE PROPIONATE 100 MCG: 50 SPRAY, METERED NASAL at 12:03

## 2020-03-11 RX ADMIN — PREDNISONE 60 MG: 20 TABLET ORAL at 09:03

## 2020-03-11 RX ADMIN — TORSEMIDE 40 MG: 20 TABLET ORAL at 09:03

## 2020-03-11 RX ADMIN — METHIMAZOLE 20 MG: 10 TABLET ORAL at 09:03

## 2020-03-11 RX ADMIN — VANCOMYCIN HYDROCHLORIDE 1250 MG: 1.25 INJECTION, POWDER, LYOPHILIZED, FOR SOLUTION INTRAVENOUS at 09:03

## 2020-03-11 RX ADMIN — VANCOMYCIN HYDROCHLORIDE 1250 MG: 1.25 INJECTION, POWDER, LYOPHILIZED, FOR SOLUTION INTRAVENOUS at 11:03

## 2020-03-11 RX ADMIN — CEPHALEXIN 500 MG: 500 CAPSULE ORAL at 04:03

## 2020-03-11 RX ADMIN — CEFAZOLIN 1 G: 1 INJECTION, POWDER, FOR SOLUTION INTRAMUSCULAR; INTRAVENOUS at 04:03

## 2020-03-11 RX ADMIN — ISOSORBIDE DINITRATE 10 MG: 10 TABLET ORAL at 09:03

## 2020-03-11 RX ADMIN — ISOSORBIDE DINITRATE 10 MG: 10 TABLET ORAL at 04:03

## 2020-03-11 RX ADMIN — GUAIFENESIN AND DEXTROMETHORPHAN HYDROBROMIDE 1 TABLET: 600; 30 TABLET, EXTENDED RELEASE ORAL at 09:03

## 2020-03-11 RX ADMIN — CEFEPIME 2 G: 2 INJECTION, POWDER, FOR SOLUTION INTRAVENOUS at 11:03

## 2020-03-11 NOTE — ASSESSMENT & PLAN NOTE
- BCx, RIP panel pending/ CXR reviewed with no obvious sign of infection/PNA. UA negative.    - c/o cough and congestion with fever. DDX respiratory infection/flu vs infected hematoma  - lactate has normalized  - ID consult and continue broad spectrum abx for now

## 2020-03-11 NOTE — SUBJECTIVE & OBJECTIVE
Interval HPI:  AAOX4, Clinically Euthyroid, Pt with Low grade fever 100.6.  HR 80 -100. Pt had tachycardia in response to fever.  Now resolved.  Labs from 3/6/20 TSH 0.017 (Suppressed), FT4 1.82 (improving from 2.95 on 3/2/20)  No evidence of thyroid storm at this time.     Overnight events: NINA   Eatin%  Nausea: No  Hypoglycemia and intervention: No  Fever: No  TPN and/or TF: No    PMH, PSH, FH, SH updated and reviewed     ROS:      Review of Systems   Constitutional: Negative for appetite change.   HENT: Negative for trouble swallowing.    Eyes: Negative for visual disturbance.   Respiratory: Negative for shortness of breath.    Cardiovascular: Negative for chest pain.   Gastrointestinal: Negative for constipation.   Endocrine: Negative for cold intolerance.   Skin: Negative for rash.   Hematological: Negative for adenopathy.   Psychiatric/Behavioral: Negative for agitation.       PHYSICAL EXAMINATION:  Vitals:    20 1616   BP:    Pulse: 96   Resp:    Temp: (P) 98.8 °F (37.1 °C)     Body mass index is 35.46 kg/m².    Physical Exam   Constitutional: He appears well-developed.   HENT:   Right Ear: External ear normal.   Left Ear: External ear normal.   Nose: Nose normal.   Neck: No tracheal deviation present. No thyromegaly present.   Cardiovascular: Normal rate.   No murmur heard.  Pulmonary/Chest: Effort normal and breath sounds normal.   Abdominal: Soft. He exhibits no mass. No hernia.   Musculoskeletal: He exhibits no edema.   Neurological: He is alert. No cranial nerve deficit or sensory deficit. Coordination normal.   Skin: No rash noted.   Psychiatric: He has a normal mood and affect. Judgment normal.   Vitals reviewed.

## 2020-03-11 NOTE — PROGRESS NOTES
Pharmacokinetic Initial Assessment: IV Vancomycin    Assessment/Plan:    Vancomycin was initiated at 1250mg IV x 1.   Plan to administer current dose every 12 hours.   Desired empiric serum trough concentration is 15 to 20 mcg/mL  Draw vancomycin trough level 30 min prior to fourth dose on 3.12 at 2230.     Please contact pharmacy at extension [phone] with any questions regarding this assessment.     Thank you for the consult,   Kami Rubio       Patient brief summary:  Tim Richards is a 54 y.o. male initiated on antimicrobial therapy with IV Vancomycin for treatment of suspected sepsis    Drug Allergies:   Review of patient's allergies indicates:   Allergen Reactions    Lisinopril Anaphylaxis    Hydralazine analogues      Chronic constipation, impotence, dizziness       Actual Body Weight:   121.9 kg    Renal Function:   Estimated Creatinine Clearance: 73 mL/min (A) (based on SCr of 1.6 mg/dL (H)).,     Dialysis Method (if applicable):  N/A    CBC (last 72 hours):  Recent Labs   Lab Result Units 03/10/20  0453 03/11/20  0359   WBC K/uL 9.76 11.84   Hemoglobin g/dL 12.4* 13.2*   Hematocrit % 42.1 45.3   Platelets K/uL 187 193   Gran% % 90.0* 83.2*   Lymph% % 3.0* 5.2*   Mono% % 5.2 10.5   Eosinophil% % 0.0 0.0   Basophil% % 0.1 0.2   Differential Method  Automated Automated       Metabolic Panel (last 72 hours):  Recent Labs   Lab Result Units 03/10/20  0453 03/11/20  0359 03/11/20  0454   Sodium mmol/L 142 141  --    Potassium mmol/L 3.9 3.9  --    Chloride mmol/L 100 99  --    CO2 mmol/L 30* 27  --    Glucose mg/dL 115* 86  --    Glucose, UA   --   --  Negative   BUN, Bld mg/dL 28* 27*  --    Creatinine mg/dL 1.5* 1.6*  --    Albumin g/dL  --  3.5  --    Total Bilirubin mg/dL  --  0.5  --    Alkaline Phosphatase U/L  --  92  --    AST U/L  --  31  --    ALT U/L  --  22  --    Magnesium mg/dL 2.0 1.8  --    Phosphorus mg/dL 3.8 3.1  --        Drug levels (last 3 results):  No results for input(s):  VANCOMYCINRA, VANCOMYCINPE, VANCOMYCINTR in the last 72 hours.    Microbiologic Results:  Microbiology Results (last 7 days)     Procedure Component Value Units Date/Time    Blood culture [[id number]] Collected:  03/11/20 0431    Order Status:  Completed Specimen:  Blood Updated:  03/11/20 1315     Blood Culture, Routine No Growth to date    Blood culture [[id number]] Collected:  03/11/20 0431    Order Status:  Completed Specimen:  Blood Updated:  03/11/20 1315     Blood Culture, Routine No Growth to date    Respiratory Infection Panel (PCR), Nasopharyngeal [[id number]] Collected:  03/11/20 0922    Order Status:  Sent Specimen:  Nasopharyngeal Swab Updated:  03/11/20 0928    Respiratory Infection Panel (PCR), Nasopharyngeal [[id number]] Collected:  03/11/20 0643    Order Status:  Canceled Specimen:  Nasopharyngeal Swab

## 2020-03-11 NOTE — SUBJECTIVE & OBJECTIVE
Past Medical History:   Diagnosis Date    CHF (congestive heart failure)     Dilated cardiomyopathy 1/10/2018    Disorder of kidney and ureter     CKD    Gout     HTN (hypertension)     Ventricular tachycardia (paroxysmal)        Past Surgical History:   Procedure Laterality Date    CARDIAC CATHETERIZATION  Dec. 2012    CARDIAC DEFIBRILLATOR PLACEMENT Left     CRRT-D    COLONOSCOPY N/A 3/6/2018    Procedure: COLONOSCOPY;  Surgeon: Alonso Bone MD;  Location: [facility] ENDO (2ND FLR);  Service: Endoscopy;  Laterality: N/A;    COLONOSCOPY N/A 7/17/2019    Procedure: COLONOSCOPY;  Surgeon: Blane Valdez MD;  Location: [facility] ENDO (2ND FLR);  Service: Endoscopy;  Laterality: N/A;    COLONOSCOPY N/A 7/18/2019    Procedure: COLONOSCOPY;  Surgeon: Blane Valdez MD;  Location: [facility] ENDO (2ND FLR);  Service: Endoscopy;  Laterality: N/A;    ESOPHAGOGASTRODUODENOSCOPY N/A 7/17/2019    Procedure: EGD (ESOPHAGOGASTRODUODENOSCOPY);  Surgeon: Blane Valdez MD;  Location: [facility] ENDO (2ND FLR);  Service: Endoscopy;  Laterality: N/A;    ESOPHAGOGASTRODUODENOSCOPY N/A 7/18/2019    Procedure: EGD (ESOPHAGOGASTRODUODENOSCOPY);  Surgeon: Blane Valdez MD;  Location: [facility] ENDO (2ND FLR);  Service: Endoscopy;  Laterality: N/A;    NONINVASIVE CARDIAC ELECTROPHYSIOLOGY STUDY N/A 10/18/2019    Procedure: CARDIAC ELECTROPHYSIOLOGY STUDY, NONINVASIVE;  Surgeon: Raz Wagner MD;  Location: [facility] EP LAB;  Service: Cardiology;  Laterality: N/A;  VT, DFTs, MDT CRTD in situ, LVAD, anes, MB, 3098    REPLACEMENT OF IMPLANTABLE CARDIOVERTER-DEFIBRILLATOR (ICD) GENERATOR N/A 3/9/2020    Procedure: REPLACEMENT, ICD GENERATOR;  Surgeon: Harry Yun MD;  Location: [facility] EP LAB;  Service: Cardiology;  Laterality: N/A;  VT, ICD Gen Change and Lead Revision, MDT, MAC, DM,3 Prep    REVISION OF IMPLANTABLE CARDIOVERTER-DEFIBRILLATOR (ICD) ELECTRODE LEAD PLACEMENT N/A 3/9/2020    Procedure: REVISION, INSERTION, ELECTRODE LEAD, ICD;  Surgeon: Harry SAUER  MD Jama;  Location: [facility] EP LAB;  Service: Cardiology;  Laterality: N/A;  VT, ICD Gen Change and Lead Revision, MDT, MAC, DM,3 Prep    TREATMENT OF CARDIAC ARRHYTHMIA  10/18/2019    Procedure: Cardioversion or Defibrillation;  Surgeon: Raz Wagner MD;  Location: [facility] EP LAB;  Service: Cardiology;;       Review of patient's allergies indicates:   Allergen Reactions    Lisinopril Anaphylaxis    Hydralazine analogues      Chronic constipation, impotence, dizziness       Medications:  Medications Prior to Admission   Medication Sig    allopurinol (ZYLOPRIM) 100 MG tablet TAKE 1 TABLET BY MOUTH EVERY DAY    amiodarone (PACERONE) 400 MG tablet Take 1 tablet (400 mg total) by mouth 2 (two) times daily.    amLODIPine (NORVASC) 10 MG tablet Take 1 tablet (10 mg total) by mouth once daily. (Patient taking differently: Take 10 mg by mouth 2 (two) times daily. )    doxazosin (CARDURA) 8 MG Tab Take 1 tablet (8 mg total) by mouth every 12 (twelve) hours.    ferrous gluconate (FERGON) 324 MG tablet Take 1 tablet (324 mg total) by mouth daily with breakfast.    magnesium oxide (MAG-OX) 400 mg (241.3 mg magnesium) tablet TAKE 1 TABLET (400 MG TOTAL) BY MOUTH 3 (THREE) TIMES DAILY.    methIMAzole (TAPAZOLE) 10 MG Tab Take 2 tablets (20 mg total) by mouth 2 (two) times daily.    pantoprazole (PROTONIX) 40 MG tablet TAKE 1 TABLET (40 MG TOTAL) BY MOUTH ONCE DAILY.    potassium chloride (K-TAB) 20 mEq Take 2 tablets (40 mEq total) by mouth once daily.    predniSONE (DELTASONE) 20 MG tablet Take 3 tablets (60 mg total) by mouth once daily.    torsemide (DEMADEX) 20 MG Tab Take 2 tablets (40 mg total) by mouth once daily.    warfarin (COUMADIN) 5 MG tablet Take 1 tablet (5 mg total) by mouth Daily.    zolpidem (AMBIEN) 10 mg Tab Take 1 tablet (10 mg total) by mouth nightly as needed.    sildenafil (VIAGRA) 100 MG tablet Take 1 tablet (100 mg total) by mouth daily as needed for Erectile Dysfunction.    warfarin  (COUMADIN) 5 MG tablet TAKE 1 TABLETS (5MG) BY MOUTH ON TUES, THURS, SAT. TAKE 1.5 TABLETS (7.5MG) ALL OTHER DAYS.     Antibiotics (From admission, onward)    Start     Stop Route Frequency Ordered    20 2300  vancomycin 1.25 g in dextrose 5% 250 mL IVPB (ready to mix)      -- IV Every 12 hours (non-standard times) 20 1356    20 1145  ceFEPIme injection 2 g      -- IV Every 8 hours (non-standard times) 20 1034        Antifungals (From admission, onward)    None        Antivirals (From admission, onward)    None           Immunization History   Administered Date(s) Administered    Hepatitis A / Hepatitis B 2018    Influenza 2014    Influenza - Quadrivalent - PF (6 months and older) 2015, 2016, 10/05/2017    Influenza - Trivalent - PF (PED) 2014    Pneumococcal Conjugate - 13 Valent 2014    Pneumococcal Polysaccharide - 23 Valent 10/01/2015, 2016    Tdap 2018       Family History     Problem Relation (Age of Onset)    Cancer Sister (54)    Coronary artery disease Father    Diabetes Father    Heart disease Father    Hypertension Father    No Known Problems Mother, Brother        Social History     Socioeconomic History    Marital status:      Spouse name: Not on file    Number of children: Not on file    Years of education: Not on file    Highest education level: Not on file   Occupational History     Employer: [employer]    Social Needs    Financial resource strain: Not hard at all    Food insecurity:     Worry: Never true     Inability: Never true    Transportation needs:     Medical: No     Non-medical: No   Tobacco Use    Smoking status: Former Smoker     Packs/day: 1.00     Years: 31.00     Pack years: 31.00     Types: Cigarettes     Last attempt to quit: 2018     Years since quittin.1    Smokeless tobacco: Never Used    Tobacco comment: pt is quiting on his own - pt stated not qualified for program;   pt  quit on his own   Substance and Sexual Activity    Alcohol use: No     Alcohol/week: 0.0 standard drinks     Frequency: Never     Drinks per session: Patient refused     Binge frequency: Never     Comment: one fifth of gin or rum/week    Drug use: No    Sexual activity: Yes     Partners: Female     Birth control/protection: None     Comment: 10/5/17  with same partner 7 years    Lifestyle    Physical activity:     Days per week: Not on file     Minutes per session: 60 min    Stress: Very much   Relationships    Social connections:     Talks on phone: More than three times a week     Gets together: More than three times a week     Attends Gnosticism service: Not on file     Active member of club or organization: Yes     Attends meetings of clubs or organizations: More than 4 times per year     Relationship status:    Other Topics Concern    Not on file   Social History Narrative    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 Negative for appetite change, chills and diaphoresis.   Respiratory: Positive for cough. Negative for shortness of breath.    Cardiovascular: Negative for chest pain and leg swelling.   Gastrointestinal: Negative for abdominal pain, diarrhea, nausea and vomiting.   Genitourinary: Negative for dysuria.   Musculoskeletal: Positive for myalgias.   Skin: Positive for color change and wound. Negative for pallor and rash.   Neurological: Negative for dizziness and headaches.   All other systems reviewed and are negative.    Objective:     Vital Signs (Most Recent):  Temp: 100.2 °F (37.9 °C) (03/11/20 1142)  Pulse: 97 (03/11/20 1518)  Resp: 20 (03/11/20 1142)  BP: (!) 96/0 (03/11/20 1142)  SpO2: 97 % (03/11/20 1017) Vital Signs (24h Range):  Temp:  [98.5 °F (36.9 °C)-100.6 °F (38.1 °C)] 100.2 °F (37.9 °C)  Pulse:  [] 97  Resp:  [20] 20  SpO2:  [94 %-98 %] 97 %  BP: (82-96)/(0) 96/0      Weight: 121.9 kg (268 lb 11.9 oz)  Body mass index is 35.46 kg/m².    Estimated Creatinine Clearance: 73 mL/min (A) (based on SCr of 1.6 mg/dL (H)).    Physical Exam   Constitutional: He is oriented to person, place, and time. He appears well-developed and well-nourished. No distress.   HENT:   Head: Normocephalic.   Eyes: Pupils are equal, round, and reactive to light.   Cardiovascular: Normal rate, regular rhythm and normal heart sounds.   No murmur heard.  Pulmonary/Chest: Effort normal. No stridor. No respiratory distress. He has wheezes. He has no rales.   Abdominal: Soft. He exhibits no distension. There is no tenderness.   VAD    Musculoskeletal: Normal range of motion. He exhibits no edema or deformity.   Neurological: He is alert and oriented to person, place, and time. No cranial nerve deficit.   Skin: Skin is warm and dry. No rash noted. He is not diaphoretic. No erythema.   Bruising noted of left chest   Wounds not examined. Bandage in place   Psychiatric: He has a normal mood and affect.   Vitals reviewed.      Significant Labs: All pertinent labs within the past 24 hours have been reviewed.    Significant Imaging: I have reviewed all pertinent imaging results/findings within the past 24 hours.

## 2020-03-11 NOTE — PROGRESS NOTES
Pt unable to be weighed at this time due to c/o dizziness but asked for it to be done later today.

## 2020-03-11 NOTE — PLAN OF CARE
Pt is RIMA ABARCA, Ox4. Calm, cooperative. Free of falls, trauma, and injuries. Skin intact ex LCW incision and DLES. Pt educated on treatment plan. Pt demonstrates and verbalizes understanding. VSS. Pressure dressing removed from LCW, bruising stable. IV Vanc and Cefepime per MAR. Flu swab pending. VAD HM 2, kit dressing QOD. Plan of care reviewed with pt.

## 2020-03-11 NOTE — ASSESSMENT & PLAN NOTE
53 y/o male with DCM s/p LVAD as DT, HTN, ICD 2014 presents for ICD generator exchange and placement of new ICD lead. He developed fevers of 100.6 overnight. He was given dose of cefazolin and is now on vancomycin. He was noted to have small hematoma of chest near surgical incision. EP following. Blood cultures are pending. CXR negative. pro calcitonin negative. UA with 2 WBCs. CRP 50.7. TMax 102.3 today. We are consulted for abx recommendations.       1. Continue vancomycin. Agree with adding cefepime 2gq8hr pending culture data.   2. Influenza A positive. Started tamiflu today   3. EP following, no drainage from incision site. Monitor hematoma   4. ID will follow closely with you. Seen and discussed with ID staff.

## 2020-03-11 NOTE — ASSESSMENT & PLAN NOTE
-Managed by cardiology   -Pt with Amiodarone induced hyperthyroidism Type 2   (Unable to be off amiodarone due to repeated Vtach episodes in the past, benefits outweigh the risk)   -Will manage hyperthyroidism with Prednisone and Methimazole

## 2020-03-11 NOTE — HPI
55 YO Male w/ diagnosis of Amiodarone induced hyperthyroidism Type 2, HTN ,DCM,(EF 10%, grade III DD) s/p HM II with Outflow graft changed (03/9/18) as DT, BiV-Icd Medtr[employer] (2014) and obesity  That was admitted to [facility] for COURTNEY generator change. Pt was consulted with endocrinology for management of Amiodarone induced hyperthyroidism.      Pt recently seen by Dr. Piedra and dose of Prednisone was increased 60mg Daily and Methimazole 20mg BID.

## 2020-03-11 NOTE — CONSULTS
[facility]  Endocrinology  Consult Note    Consult Requested by: Guerda Bautista MD   Reason for admit: LVAD (left ventricular assist device) present    HISTORY OF PRESENT ILLNESS:  55 YO Male w/ diagnosis of Amiodarone induced hyperthyroidism Type 2, HTN ,DCM,(EF 10%, grade III DD) s/p HM II with Outflow graft changed (03/9/18) as DT, BiV-Icd Medtronic (2014) and obesity  That was admitted to [facility] [place] [place] for COURTNEY generator change. Pt was consulted with endocrinology for management of Amiodarone induced hyperthyroidism.      Pt recently seen by Dr. Piedra and dose of Prednisone was increased 60mg Daily and Methimazole 20mg BID.       Medications and/or Treatments Impacting Glycemic Control:  Immunotherapy:    Immunosuppressants     None        Steroids:   Hormones (From admission, onward)    Start     Stop Route Frequency Ordered    03/09/20 2130  predniSONE tablet 30 mg      -- Oral Once 03/09/20 2016 03/09/20 1315  predniSONE tablet 60 mg      -- Oral Daily 03/09/20 1213        Pressors:    Autonomic Drugs (From admission, onward)    None          Medications Prior to Admission   Medication Sig Dispense Refill Last Dose    allopurinol (ZYLOPRIM) 100 MG tablet TAKE 1 TABLET BY MOUTH EVERY DAY 30 tablet 5 3/8/2020 at 2000    amiodarone (PACERONE) 400 MG tablet Take 1 tablet (400 mg total) by mouth 2 (two) times daily. 60 tablet 11 3/8/2020 at 0800    amLODIPine (NORVASC) 10 MG tablet Take 1 tablet (10 mg total) by mouth once daily. (Patient taking differently: Take 10 mg by mouth 2 (two) times daily. ) 30 tablet 11 3/8/2020 at 2000    doxazosin (CARDURA) 8 MG Tab Take 1 tablet (8 mg total) by mouth every 12 (twelve) hours. 60 tablet 11 3/8/2020 at 2000    ferrous gluconate (FERGON) 324 MG tablet Take 1 tablet (324 mg total) by mouth daily with breakfast. 90 tablet 6 3/8/2020 at 0800    magnesium oxide (MAG-OX) 400 mg (241.3 mg magnesium) tablet TAKE 1 TABLET (400 MG TOTAL) BY MOUTH 3 (THREE)  TIMES DAILY. 270 tablet 2 3/8/2020 at 2000    methIMAzole (TAPAZOLE) 10 MG Tab Take 2 tablets (20 mg total) by mouth 2 (two) times daily. 120 tablet 2 3/8/2020 at 2000    pantoprazole (PROTONIX) 40 MG tablet TAKE 1 TABLET (40 MG TOTAL) BY MOUTH ONCE DAILY. 90 tablet 3 3/8/2020 at 0800    potassium chloride (K-TAB) 20 mEq Take 2 tablets (40 mEq total) by mouth once daily. 60 tablet 11 3/8/2020 at 2000    predniSONE (DELTASONE) 20 MG tablet Take 3 tablets (60 mg total) by mouth once daily. 90 tablet 2 3/8/2020 at 2000    torsemide (DEMADEX) 20 MG Tab Take 2 tablets (40 mg total) by mouth once daily. 60 tablet 11 3/8/2020 at 0800    warfarin (COUMADIN) 5 MG tablet Take 1 tablet (5 mg total) by mouth Daily. 30 tablet 11 3/8/2020 at 2000    zolpidem (AMBIEN) 10 mg Tab Take 1 tablet (10 mg total) by mouth nightly as needed. 7 tablet 0 3/8/2020 at Unknown time    sildenafil (VIAGRA) 100 MG tablet Take 1 tablet (100 mg total) by mouth daily as needed for Erectile Dysfunction. 10 tablet 1 Taking    warfarin (COUMADIN) 5 MG tablet TAKE 1 TABLETS (5MG) BY MOUTH ON TUES, THURS, SAT. TAKE 1.5 TABLETS (7.5MG) ALL OTHER DAYS. 135 tablet 3 Taking       Current Facility-Administered Medications   Medication Dose Route Frequency Provider Last Rate Last Dose    acetaminophen tablet 650 mg  650 mg Oral Q4H PRN Daren Sanchez MD   650 mg at 03/11/20 1004    allopurinoL tablet 100 mg  100 mg Oral Daily Guerda Bautista MD        amiodarone tablet 400 mg  400 mg Oral Daily Shamir York MD   400 mg at 03/11/20 0911    amLODIPine tablet 10 mg  10 mg Oral Daily Daren Sanchez MD   10 mg at 03/11/20 0912    ceFEPIme injection 2 g  2 g Intravenous Q8H Ute Boggs MD   2 g at 03/11/20 1134    dextromethorphan-guaifenesin  mg per 12 hr tablet 1 tablet  1 tablet Oral BID Ivette Gustafson PA-C   1 tablet at 03/11/20 1134    diphenhydrAMINE capsule 25 mg  25 mg Oral Q6H PRN Maira Crum NP   25 mg at 03/10/20  1441    doxazosin tablet 8 mg  8 mg Oral QHS Shamir York MD   8 mg at 03/10/20 2022    fluticasone propionate 50 mcg/actuation nasal spray 100 mcg  2 spray Each Nostril Daily Ivette Gustafson PA-C   100 mcg at 20 1203    isosorbide dinitrate tablet 10 mg  10 mg Oral TID Ivette Gustafson PA-C   10 mg at 20 1608    methIMAzole tablet 20 mg  20 mg Oral BID Daren Sanchez MD   20 mg at 20 0911    oseltamivir capsule 75 mg  75 mg Oral BID [facility] LEYDI Valdivia        pantoprazole EC tablet 40 mg  40 mg Oral Daily Daren Sanchez MD   40 mg at 20    predniSONE tablet 30 mg  30 mg Oral Once Shamir York MD        predniSONE tablet 60 mg  60 mg Oral Daily Daren Sanchez MD   60 mg at 20    senna-docusate 8.6-50 mg per tablet 1 tablet  1 tablet Oral Daily PRN Shamir York MD        torsemide tablet 40 mg  40 mg Oral Daily Daren Sanchez MD   40 mg at 20    traMADol tablet 50 mg  50 mg Oral Q6H PRN Shamir York MD   50 mg at 03/10/20 0003    vancomycin 1.25 g in dextrose 5% 250 mL IVPB (ready to mix)  1,250 mg Intravenous Q12H Guerda Bautista MD        warfarin split tablet 2.5 mg  2.5 mg Oral Daily Guerda Bautista MD        zolpidem tablet 10 mg  10 mg Oral Nightly PRN Shamir York MD   10 mg at 20       Interval HPI:  AAOX4, Clinically Euthyroid, Pt with Low grade fever 100.6.  HR 80 -100. Pt had tachycardia in response to fever.  Now resolved.  Labs from 3/6/20 TSH 0.017 (Suppressed), FT4 1.82 (improving from 2.95 on 3/2/20)  No evidence of thyroid storm at this time.     Overnight events: NINA   Eatin%  Nausea: No  Hypoglycemia and intervention: No  Fever: No  TPN and/or TF: No    PMH, PSH, FH, SH updated and reviewed     ROS:      Review of Systems   Constitutional: Negative for appetite change.   HENT: Negative for trouble swallowing.    Eyes: Negative for visual disturbance.   Respiratory: Negative for shortness  of breath.    Cardiovascular: Negative for chest pain.   Gastrointestinal: Negative for constipation.   Endocrine: Negative for cold intolerance.   Skin: Negative for rash.   Hematological: Negative for adenopathy.   Psychiatric/Behavioral: Negative for agitation.       PHYSICAL EXAMINATION:  Vitals:    03/11/20 1616   BP:    Pulse: 96   Resp:    Temp: (P) 98.8 °F (37.1 °C)     Body mass index is 35.46 kg/m².    Physical Exam   Constitutional: He appears well-developed.   HENT:   Right Ear: External ear normal.   Left Ear: External ear normal.   Nose: Nose normal.   Neck: No tracheal deviation present. No thyromegaly present.   Cardiovascular: Normal rate.   No murmur heard.  Pulmonary/Chest: Effort normal and breath sounds normal.   Abdominal: Soft. He exhibits no mass. No hernia.   Musculoskeletal: He exhibits no edema.   Neurological: He is alert. No cranial nerve deficit or sensory deficit. Coordination normal.   Skin: No rash noted.   Psychiatric: He has a normal mood and affect. Judgment normal.   Vitals reviewed.    .     ASSESSMENT and PLAN:    Hyperthyroidism  -Pt on Amiodarone since 2012   -Currently on Amiodarone 400mg daily  -Likely Type 2 Amiodarone hyperthyroidism in the setting of chronic amiodarone and Negative Ab   -TRAb (-)   -Thyroid US from 1/13/20 Showing Thyromegaly and Normal vascularity which is suggestive of Type 2 Amiodarone induced hyperthyroidism   -No clinical evidence of Thyroid storm   -Clinically Euthyroid   -FT4 1.82 on 3/6/20 from 2.95 on 3/2/20  Improving      Plan:   -C/w Prednisone 60mg PO daily   -C/w Methimazole to 20mg BID   -Repeat TFT today   -Will follow results for dose titration               VF (ventricular fibrillation)  -Managed by cardiology   -Pt with Amiodarone induced hyperthyroidism Type 2   (Unable to be off amiodarone due to repeated Vtach episodes in the past, benefits outweigh the risk)   -Will manage hyperthyroidism with Prednisone and Methimazole        Fever  -Less likely related to hyperthyroidism   -No clinical evidence of thyroid storm   -Clinically euthyroid   -Managed by primary team           DISCHARGE NEEDS: will assess daily    Dalton Chance MD  Endocrinology  [facility]     I have reviewed and concur with Dr. Benitez's history, physical, assessment, and plan.  I have personally interviewed and examined the patient.      Amiodorone induced thyrotoxicosis currently treated with methimazole and prednisone with recent improvement in thyroid function. Repeat labs now with medication titration pending results    Elizabeth Huerta MD

## 2020-03-11 NOTE — ASSESSMENT & PLAN NOTE
-Less likely related to hyperthyroidism   -No clinical evidence of thyroid storm   -Clinically euthyroid   -Managed by primary team

## 2020-03-11 NOTE — PROGRESS NOTES
At this point, pt has not done his own LVAD dressing change. Reminded multiple times. Pt said he fell asleep but will do it.

## 2020-03-11 NOTE — CONSULTS
[facility]  Infectious Disease  Consult Note    Patient Name: Tim Richards  MRN: [id number]  Admission Date: 3/9/2020  Hospital Length of Stay: 0 days  Attending Physician: Guerda Bautista MD  Primary Care Provider: Power Connors MD     Isolation Status: No active isolations    Inpatient consult to Infectious Diseases  Consult performed by: Cal Valdivia PA-C  Consult ordered by: Ivette Gustafson PA-C        Assessment/Plan:     Fever  55 y/o male with DCM s/p LVAD as DT, HTN, ICD 2014 presents for ICD generator exchange and placement of new ICD lead. He developed fevers of 100.6 overnight. He was given dose of cefazolin and is now on vancomycin. He was noted to have small hematoma of chest near surgical incision. EP following. Blood cultures are pending. CXR negative. pro calcitonin negative. UA with 2 WBCs. CRP 50.7. TMax 102.3 today. We are consulted for abx recommendations.       1. Continue vancomycin. Agree with adding cefepime 2gq8hr pending culture data.   2. Influenza A positive. Started tamiflu today   3. EP following, no drainage from incision site. Monitor hematoma   4. ID will follow closely with you. Seen and discussed with ID staff.         Thank you for your consult. I will follow-up with patient. Please contact us if you have any additional questions.    Cal Valdivia PA-C  Infectious Disease  [facility]    Subjective:     Principal Problem: LVAD (left ventricular assist device) present    HPI: 55 y/o male with DCM s/p LVAD as DT, HTN, ICD 2014 presents for ICD generator exchange and placement of new ICD lead. He developed fevers of 100.6 overnight. He was given dose of cefazolin and is now on vancomycin. He was noted to have small hematoma of chest near surgical incision. EP following. Blood cultures are pending. CXR negative. pro calcitonin negative. UA with 2 WBCs. CRP 50.7. TMax 102.3 today. We are consulted for abx recommendations.     He  "reports feeling much improved since abx were initiated. Reports having "flu like symptoms". States his wife may have a cold but unsure. Denies any increase SOB. Reports having productive cough that has now resolved. Has body aches and fatigue and night sweats. Fever of 102.3 today. No diarrhea or dysuria.     Past Medical History:   Diagnosis Date    CHF (congestive heart failure)     Dilated cardiomyopathy 1/10/2018    Disorder of kidney and ureter     CKD    Gout     HTN (hypertension)     Ventricular tachycardia (paroxysmal)        Past Surgical History:   Procedure Laterality Date    CARDIAC CATHETERIZATION  Dec. 2012    CARDIAC DEFIBRILLATOR PLACEMENT Left     CRRT-D    COLONOSCOPY N/A 3/6/2018    Procedure: COLONOSCOPY;  Surgeon: Alonso Bone MD;  Location: [facility] (2ND FLR);  Service: Endoscopy;  Laterality: N/A;    COLONOSCOPY N/A 7/17/2019    Procedure: COLONOSCOPY;  Surgeon: Blane Valdez MD;  Location: [facility] ENDO (2ND FLR);  Service: Endoscopy;  Laterality: N/A;    COLONOSCOPY N/A 7/18/2019    Procedure: COLONOSCOPY;  Surgeon: Blane Valdez MD;  Location: [facility] ENDO (2ND FLR);  Service: Endoscopy;  Laterality: N/A;    ESOPHAGOGASTRODUODENOSCOPY N/A 7/17/2019    Procedure: EGD (ESOPHAGOGASTRODUODENOSCOPY);  Surgeon: Blane Valdez MD;  Location: [facility] ENDO (2ND FLR);  Service: Endoscopy;  Laterality: N/A;    ESOPHAGOGASTRODUODENOSCOPY N/A 7/18/2019    Procedure: EGD (ESOPHAGOGASTRODUODENOSCOPY);  Surgeon: Blane Valdez MD;  Location: [facility] ENDO (2ND FLR);  Service: Endoscopy;  Laterality: N/A;    NONINVASIVE CARDIAC ELECTROPHYSIOLOGY STUDY N/A 10/18/2019    Procedure: CARDIAC ELECTROPHYSIOLOGY STUDY, NONINVASIVE;  Surgeon: Raz Wagner MD;  Location: [facility] EP LAB;  Service: Cardiology;  Laterality: N/A;  VT, DFTs, MDT CRTD in situ, LVAD, LASHELL pizarro, 8898    REPLACEMENT OF IMPLANTABLE CARDIOVERTER-DEFIBRILLATOR (ICD) GENERATOR N/A 3/9/2020    Procedure: REPLACEMENT, ICD GENERATOR;  Surgeon: Harry SAUER" MD Jama;  Location: [facility] EP LAB;  Service: Cardiology;  Laterality: N/A;  VT, ICD Gen Change and Lead Revision, MDT, MAC, DM,3 Prep    REVISION OF IMPLANTABLE CARDIOVERTER-DEFIBRILLATOR (ICD) ELECTRODE LEAD PLACEMENT N/A 3/9/2020    Procedure: REVISION, INSERTION, ELECTRODE LEAD, ICD;  Surgeon: Harry Yun MD;  Location: [facility] EP LAB;  Service: Cardiology;  Laterality: N/A;  VT, ICD Gen Change and Lead Revision, MDT, MAC, DM,3 Prep    TREATMENT OF CARDIAC ARRHYTHMIA  10/18/2019    Procedure: Cardioversion or Defibrillation;  Surgeon: Raz Wagner MD;  Location: [facility] EP LAB;  Service: Cardiology;;       Review of patient's allergies indicates:   Allergen Reactions    Lisinopril Anaphylaxis    Hydralazine analogues      Chronic constipation, impotence, dizziness       Medications:  Medications Prior to Admission   Medication Sig    allopurinol (ZYLOPRIM) 100 MG tablet TAKE 1 TABLET BY MOUTH EVERY DAY    amiodarone (PACERONE) 400 MG tablet Take 1 tablet (400 mg total) by mouth 2 (two) times daily.    amLODIPine (NORVASC) 10 MG tablet Take 1 tablet (10 mg total) by mouth once daily. (Patient taking differently: Take 10 mg by mouth 2 (two) times daily. )    doxazosin (CARDURA) 8 MG Tab Take 1 tablet (8 mg total) by mouth every 12 (twelve) hours.    ferrous gluconate (FERGON) 324 MG tablet Take 1 tablet (324 mg total) by mouth daily with breakfast.    magnesium oxide (MAG-OX) 400 mg (241.3 mg magnesium) tablet TAKE 1 TABLET (400 MG TOTAL) BY MOUTH 3 (THREE) TIMES DAILY.    methIMAzole (TAPAZOLE) 10 MG Tab Take 2 tablets (20 mg total) by mouth 2 (two) times daily.    pantoprazole (PROTONIX) 40 MG tablet TAKE 1 TABLET (40 MG TOTAL) BY MOUTH ONCE DAILY.    potassium chloride (K-TAB) 20 mEq Take 2 tablets (40 mEq total) by mouth once daily.    predniSONE (DELTASONE) 20 MG tablet Take 3 tablets (60 mg total) by mouth once daily.    torsemide (DEMADEX) 20 MG Tab Take 2 tablets (40 mg total) by mouth  once daily.    warfarin (COUMADIN) 5 MG tablet Take 1 tablet (5 mg total) by mouth Daily.    zolpidem (AMBIEN) 10 mg Tab Take 1 tablet (10 mg total) by mouth nightly as needed.    sildenafil (VIAGRA) 100 MG tablet Take 1 tablet (100 mg total) by mouth daily as needed for Erectile Dysfunction.    warfarin (COUMADIN) 5 MG tablet TAKE 1 TABLETS (5MG) BY MOUTH ON TUES, THURS, SAT. TAKE 1.5 TABLETS (7.5MG) ALL OTHER DAYS.     Antibiotics (From admission, onward)    Start     Stop Route Frequency Ordered    03/11/20 2300  vancomycin 1.25 g in dextrose 5% 250 mL IVPB (ready to mix)      -- IV Every 12 hours (non-standard times) 03/11/20 1356    03/11/20 1145  ceFEPIme injection 2 g      -- IV Every 8 hours (non-standard times) 03/11/20 1034        Antifungals (From admission, onward)    None        Antivirals (From admission, onward)    None           Immunization History   Administered Date(s) Administered    Hepatitis A / Hepatitis B 02/23/2018    Influenza 11/01/2014    Influenza - Quadrivalent - PF (6 months and older) 09/23/2015, 09/21/2016, 10/05/2017    Influenza - Trivalent - PF (PED) 11/01/2014    Pneumococcal Conjugate - 13 Valent 11/01/2014    Pneumococcal Polysaccharide - 23 Valent 10/01/2015, 03/24/2016    Tdap 02/23/2018       Family History     Problem Relation (Age of Onset)    Cancer Sister (54)    Coronary artery disease Father    Diabetes Father    Heart disease Father    Hypertension Father    No Known Problems Mother, Brother        Social History     Socioeconomic History    Marital status:      Spouse name: Not on file    Number of children: Not on file    Years of education: Not on file    Highest education level: Not on file   Occupational History     Employer: [employer]    Social Needs    Financial resource strain: Not hard at all    Food insecurity:     Worry: Never true     Inability: Never true    Transportation needs:     Medical: No     Non-medical: No   Tobacco  Use    Smoking status: Former Smoker     Packs/day: 1.00     Years: 31.00     Pack years: 31.00     Types: Cigarettes     Last attempt to quit: 2018     Years since quittin.1    Smokeless tobacco: Never Used    Tobacco comment: pt is quiting on his own - pt stated not qualified for program;  pt  quit on his own   Substance and Sexual Activity    Alcohol use: No     Alcohol/week: 0.0 standard drinks     Frequency: Never     Drinks per session: Patient refused     Binge frequency: Never     Comment: one fifth of gin or rum/week    Drug use: No    Sexual activity: Yes     Partners: Female     Birth control/protection: None     Comment: 10/5/17  with same partner 7 years    Lifestyle    Physical activity:     Days per week: Not on file     Minutes per session: 60 min    Stress: Very much   Relationships    Social connections:     Talks on phone: More than three times a week     Gets together: More than three times a week     Attends [employer] service: Not on file     Active member of club or organization: Yes     Attends meetings of clubs or organizations: More than 4 times per year     Relationship status:    Other Topics Concern    Not on file   Social History Narrative    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 Negative for appetite change, chills and diaphoresis.   Respiratory: Positive for cough. Negative for shortness of breath.    Cardiovascular: Negative for chest pain and leg swelling.   Gastrointestinal: Negative for abdominal pain, diarrhea, nausea and vomiting.   Genitourinary: Negative for dysuria.   Musculoskeletal: Positive for myalgias.   Skin: Positive for color change and wound. Negative for pallor and rash.   Neurological: Negative for dizziness and headaches.   All other systems reviewed and are negative.    Objective:     Vital Signs (Most Recent):  Temp: 100.2 °F (37.9 °C)  (03/11/20 1142)  Pulse: 97 (03/11/20 1518)  Resp: 20 (03/11/20 1142)  BP: (!) 96/0 (03/11/20 1142)  SpO2: 97 % (03/11/20 1017) Vital Signs (24h Range):  Temp:  [98.5 °F (36.9 °C)-100.6 °F (38.1 °C)] 100.2 °F (37.9 °C)  Pulse:  [] 97  Resp:  [20] 20  SpO2:  [94 %-98 %] 97 %  BP: (82-96)/(0) 96/0     Weight: 121.9 kg (268 lb 11.9 oz)  Body mass index is 35.46 kg/m².    Estimated Creatinine Clearance: 73 mL/min (A) (based on SCr of 1.6 mg/dL (H)).    Physical Exam   Constitutional: He is oriented to person, place, and time. He appears well-developed and well-nourished. No distress.   HENT:   Head: Normocephalic.   Eyes: Pupils are equal, round, and reactive to light.   Cardiovascular: Normal rate, regular rhythm and normal heart sounds.   No murmur heard.  Pulmonary/Chest: Effort normal. No stridor. No respiratory distress. He has wheezes. He has no rales.   Abdominal: Soft. He exhibits no distension. There is no tenderness.   VAD    Musculoskeletal: Normal range of motion. He exhibits no edema or deformity.   Neurological: He is alert and oriented to person, place, and time. No cranial nerve deficit.   Skin: Skin is warm and dry. No rash noted. He is not diaphoretic. No erythema.   Bruising noted of left chest   Wounds not examined. Bandage in place   Psychiatric: He has a normal mood and affect.   Vitals reviewed.      Significant Labs: All pertinent labs within the past 24 hours have been reviewed.    Significant Imaging: I have reviewed all pertinent imaging results/findings within the past 24 hours.

## 2020-03-11 NOTE — ASSESSMENT & PLAN NOTE
- Per last endocrine note- Dr. Piedra:  Increased prednisone back up to 60 mg once per day. Resume methimazole at 20 mg twice per day based on TFTs  - consult today for possible faster  pred wean in setting of infection

## 2020-03-11 NOTE — PROGRESS NOTES
03/11/2020  Fitz Christianson    Current provider:  Guerda Bautista MD      I, Fitz Christianson, rounded on Tim Richards to ensure all mechanical assist device settings (IABP or VAD) were appropriate and all parameters were within limits.  I was able to ensure all back up equipment was present, the staff had no issues, and the Perfusion Department daily rounding was complete.    7:04 AM

## 2020-03-11 NOTE — ASSESSMENT & PLAN NOTE
- S/P DT HM 2 3/8/18 with outflow graft exchange 3/9/18 (Dr. Kaufman)  - Current speed 9800 (decreased at clinic visit 2/27 with plan to repeat echo 3/26  - INR goal 2.0 - 3.0. INR is 2.8 today - continue Coumadin  - LDH stable today at 529  (baseline seems to be 300's - 500's)  - Doppler goal is 60-80. Dopplers have been as high as 96. Continue Norvasc and Doxazosin. Has ACE and Hydralazine allergies. Will monitor for now in setting of possible sepsis/infection    Procedure: Device Interrogation Including analysis of device parameters  Current Settings: Ventricular Assist Device  Review of device function is stable/unstable     TXP LVAD INTERROGATIONS 3/11/2020 3/11/2020 3/11/2020 3/10/2020 3/10/2020 3/10/2020 3/10/2020   Type HeartMate II HeartMate II HeartMate II HeartMate II HeartMate II HeartMate II HeartMate II   Flow 6.5 6.5 6.4 6.1 5.5 5.5 5.5   Speed 9800 9800 9800 9800 9800 9800 9800   PI 3.2 3.8 4.1 4.9 4.4 4.4 4.4   Power (Jackson) 7.1 7.2 6.8 6.9 6.4 6.4 6.4   LSL 9400 - - - - - -   Pulsatility No Pulse No Pulse No Pulse No Pulse No Pulse No Pulse No Pulse

## 2020-03-11 NOTE — CARE UPDATE
Notified of T 100.6.     Patient had recent ICD pocket revision.     -Bcx x2, UA, CXR, RVP.  -DC Keflex, restarted on Ancef 1g Q8H.

## 2020-03-11 NOTE — PROGRESS NOTES
Nasal swab for flu done and sent to lab. Notified Dr. York of temp spike 102.3 orally. Monitor showing 's

## 2020-03-11 NOTE — ASSESSMENT & PLAN NOTE
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

## 2020-03-11 NOTE — HPI
"55 y/o male with DCM s/p LVAD as DT, HTN, ICD 2014 presents for ICD generator exchange and placement of new ICD lead. He developed fevers of 100.6 overnight. He was given dose of cefazolin and is now on vancomycin. He was noted to have small hematoma of chest near surgical incision. EP following. Blood cultures are pending. CXR negative. pro calcitonin negative. UA with 2 WBCs. CRP 50.7. TMax 102.3 today. We are consulted for abx recommendations.     He reports feeling much improved since abx were initiated. Reports having "flu like symptoms". States his wife may have a cold but unsure. Denies any increase SOB. Reports having productive cough that has now resolved. Has body aches and fatigue and night sweats. Fever of 102.3 today. No diarrhea or dysuria.   "

## 2020-03-11 NOTE — PROGRESS NOTES
VS done. Temp now 100.6. Pt says he is aching all over. Tylenol 650mg po given. Notifed HTS. He said he will be ordering blood cultures but he will put in all his orders. Pt on keflex now.

## 2020-03-11 NOTE — ASSESSMENT & PLAN NOTE
- S/P [employer] BiV ICD 2014 with h/o multiple ICD shocks  - S/P ICD gen change (BiV -> dual chamber ICD) and addition of new RV/ICD lead along with DFT's 3/9 (Dr. Yun)  - Has small pocket hematoa today, working up for possible infection  - NO HEPARIN PRODUCTS  - Keflex 500 mg TID for 5 days at discharge

## 2020-03-11 NOTE — PROGRESS NOTES
[facility]  Heart Transplant  Progress Note    Patient Name: Tim Richards  MRN: [id number]  Admission Date: 3/9/2020  Hospital Length of Stay: 0 days  Attending Physician: Guerda Bautista MD  Primary Care Provider: Power Connors MD  Principal Problem:LVAD (left ventricular assist device) present    Subjective:     Interval History: Events from overnight reviewed. Now with fever and c/o mildly productive cough/congestion/achy feeling and URI type symptoms. Denies any worsening pain from ICD site (states it actually isn't hurting, feels less swollen). States he wife had a cough and URI symptoms as welll prior to his admission. Labs/BCx/Lactate pending this AM.     Scheduled Meds:   allopurinoL  100 mg Oral Daily    amiodarone  400 mg Oral Daily    amLODIPine  10 mg Oral Daily    ceFEPime (MAXIPIME) IVPB  2 g Intravenous Q8H    dextromethorphan-guaifenesin  mg  1 tablet Oral BID    doxazosin  8 mg Oral QHS    fluticasone propionate  2 spray Each Nostril Daily    methIMAzole  20 mg Oral BID    pantoprazole  40 mg Oral Daily    predniSONE  30 mg Oral Once    predniSONE  60 mg Oral Daily    torsemide  40 mg Oral Daily    warfarin  2.5 mg Oral Daily     PRN Meds:acetaminophen, diphenhydrAMINE, senna-docusate 8.6-50 mg, traMADoL, zolpidem    Review of patient's allergies indicates:   Allergen Reactions    Lisinopril Anaphylaxis    Hydralazine analogues      Chronic constipation, impotence, dizziness     Objective:     Vital Signs (Most Recent):  Temp: 99.3 °F (37.4 °C) (03/11/20 0724)  Pulse: (!) 126 (03/11/20 0730)  Resp: 20 (03/11/20 0724)  BP: (!) 86/0 (03/11/20 0724)  SpO2: 97 % (03/11/20 1017) Vital Signs (24h Range):  Temp:  [98.5 °F (36.9 °C)-100.6 °F (38.1 °C)] 99.3 °F (37.4 °C)  Pulse:  [] 126  Resp:  [18-20] 20  SpO2:  [94 %-98 %] 97 %  BP: ()/(0-70) 86/0     Patient Vitals for the past 72 hrs (Last 3 readings):   Weight   03/10/20 0732 121.9 kg (268  lb 11.9 oz)   03/09/20 1512 123.4 kg (272 lb 0.8 oz)   03/09/20 0744 120.2 kg (265 lb)     Body mass index is 35.46 kg/m².      Intake/Output Summary (Last 24 hours) at 3/11/2020 1111  Last data filed at 3/11/2020 1000  Gross per 24 hour   Intake 480 ml   Output 3425 ml   Net -2945 ml       Hemodynamic Parameters:       Telemetry: SR - ST    Physical Exam   Constitutional: He is oriented to person, place, and time. He appears well-developed and well-nourished.   HENT:   Head: Normocephalic and atraumatic.   Eyes: Pupils are equal, round, and reactive to light. EOM are normal.   Neck: Normal range of motion. Neck supple. No JVD present. No thyromegaly present.   Cardiovascular: Normal rate and regular rhythm.   Smooth VAD hum   Pulmonary/Chest: Effort normal and breath sounds normal.   Abdominal: Soft. Bowel sounds are normal.   Musculoskeletal: Normal range of motion. He exhibits no edema.   Neurological: He is alert and oriented to person, place, and time.   Skin: Skin is warm and dry. Capillary refill takes 2 to 3 seconds.   Psychiatric: He has a normal mood and affect. His behavior is normal. Judgment and thought content normal.       Significant Labs:  CBC:  Recent Labs   Lab 03/09/20  1006 03/10/20  0453 03/11/20  0359   WBC  --  9.76 11.84   RBC  --  4.72 5.14   HGB  --  12.4* 13.2*   HCT 35* 42.1 45.3   PLT  --  187 193   MCV  --  89 88   MCH  --  26.3* 25.7*   MCHC  --  29.5* 29.1*     BNP:  Recent Labs   Lab 03/11/20 0359   *     CMP:  Recent Labs   Lab 03/10/20  0453 03/11/20  0359   * 86   CALCIUM 8.9 9.8   ALBUMIN  --  3.5   PROT  --  6.9    141   K 3.9 3.9   CO2 30* 27    99   BUN 28* 27*   CREATININE 1.5* 1.6*   ALKPHOS  --  92   ALT  --  22   AST  --  31   BILITOT  --  0.5      Coagulation:   Recent Labs   Lab 03/09/20  0731 03/10/20  0453 03/11/20  0359   INR 2.3* 2.4* 2.8*   APTT 34.2*  --   --      LDH:  Recent Labs   Lab 03/10/20  0453 03/11/20  0359   * 529*  "    Microbiology:  Microbiology Results (last 7 days)     Procedure Component Value Units Date/Time    Respiratory Infection Panel (PCR), Nasopharyngeal [[id number]] Collected:  03/11/20 0922    Order Status:  Sent Specimen:  Nasopharyngeal Swab Updated:  03/11/20 0928    Respiratory Infection Panel (PCR), Nasopharyngeal [[id number]] Collected:  03/11/20 0643    Order Status:  Canceled Specimen:  Nasopharyngeal Swab     Blood culture [[id number]] Collected:  03/11/20 0431    Order Status:  Sent Specimen:  Blood Updated:  03/11/20 0449    Blood culture [[id number]] Collected:  03/11/20 0431    Order Status:  Sent Specimen:  Blood Updated:  03/11/20 0449          I have reviewed all pertinent labs within the past 24 hours.    Estimated Creatinine Clearance: 73 mL/min (A) (based on SCr of 1.6 mg/dL (H)).    Diagnostic Results:  I have reviewed all pertinent imaging results/findings within the past 24 hours.    Assessment and Plan:     Tim Richrads is a 54 y.o. male with DCM EF 10% s/p LVAD (DT as of now), HTN, and VT/VF s/p medtronic crt-d (2014, Dr Chiu) who presents to [facility] for outpatient generator change (device at [facility]) and placement of new ICD lead. He currently has a single coil ICD lead. He previously had sustained VT and VF which failed multiple shocks from the device and required external defibrillation for rescue. He was in decompensated heart failure at that time. Once he was compensated, DFTs were performed which were successful. He has no fever or chills. He has been feeling subjectively "pretty good" for the last couple of days per him.      From Dr Wagner's clinic note 12/31/19:  "Tim Richards 53 yo AAM with DCM,(EF 10%, grade III DD) s/p HM II with Outflow graft changed (03/9/18) as DT, BiV-Icd Medtronic (2014), HtN, obesity comes in the Ep clinic for follow up of VT and ICD management.  He was admitted to the hospital in 10/2019 with ICD shocks. When he came to the ER he was noted to be in " "MMVT. He had multiple rounds of ATP that degenerated the MMVT into VF and the he was unsuccessfully shocked multiple times . He was eventually shocked externally to convert into sinus rhythm .  It was postulated reasons for ineffective therapy (had worsening hypervolemia week prior, length of time in the arrhythmia prior to ICD shock, IV amiodarone causing higher defibrillatory tissue threshold, or change in heart-shock vector post LVAD implant). He had a NIPS which showed successful DFT at 35J. He was discharge after being diuresed. Since discharge, he has done well clinically .   Device check noted on 12/14/19 - one episode of MMVT 270 ms that was appropriately successfully shocked. He has had multiple non sustained slower VT. Of note is he had a monitoring zone added during his admission at 133. VT 1 zone at 167 does not have ATp and goes directly to 35J shock. We will add 6 rounds of ATP before shock in that zone. (refer to device interrogation in media). Device longevity - 4 months to [facility]."        * LVAD (left ventricular assist device) present  - S/P DT HM 2 3/8/18 with outflow graft exchange 3/9/18 (Dr. Kaufman)  - Current speed 9800 (decreased at clinic visit 2/27 with plan to repeat echo 3/26  - INR goal 2.0 - 3.0. INR is 2.8 today - continue Coumadin  - LDH stable today at 529  (baseline seems to be 300's - 500's)  - Doppler goal is 60-80. Dopplers have been as high as 96. Continue Norvasc and Doxazosin. Has ACE and Hydralazine allergies. Will monitor for now in setting of possible sepsis/infection    Procedure: Device Interrogation Including analysis of device parameters  Current Settings: Ventricular Assist Device  Review of device function is stable/unstable     TXP LVAD INTERROGATIONS 3/11/2020 3/11/2020 3/11/2020 3/10/2020 3/10/2020 3/10/2020 3/10/2020   Type HeartMate II HeartMate II HeartMate II HeartMate II HeartMate II HeartMate II HeartMate II   Flow 6.5 6.5 6.4 6.1 5.5 5.5 5.5   Speed 9800 9800 " 9800 9800 9800 9800 9800   PI 3.2 3.8 4.1 4.9 4.4 4.4 4.4   Power ([place]) 7.1 7.2 6.8 6.9 6.4 6.4 6.4   LSL 9400 - - - - - -   Pulsatility No Pulse No Pulse No Pulse No Pulse No Pulse No Pulse No Pulse       AICD malfunction  - S/P Medtronic BiV ICD 2014 with h/o multiple ICD shocks  - S/P ICD gen change (BiV -> dual chamber ICD) and addition of new RV/ICD lead along with DFT's 3/9 (Dr. Yun)  - Has small pocket hematoa today, working up for possible infection  - NO HEPARIN PRODUCTS  - Keflex 500 mg TID for 5 days at discharge    Hyperthyroidism  - Per last endocrine note- Dr. Piedra:  Increased prednisone back up to 60 mg once per day. Resume methimazole at 20 mg twice per day based on TFTs  - consult today for possible faster  pred wean in setting of infection    Fever  - BCx, RIP panel pending/ CXR reviewed with no obvious sign of infection/PNA. UA negative.    - c/o cough and congestion with fever. DDX respiratory infection/flu vs infected hematoma  - lactate has normalized  - ID consult and continue broad spectrum abx for now      MARILYN KitchenC  Heart Transplant  [facility]

## 2020-03-11 NOTE — PLAN OF CARE
Seen and assessed at bedside.   Pressure dressing remains in place. Ecchymoses appear stable from yesterday.   Febrile over night and this am. Blood cultures drawn over night. Given 1 dose of IV ancef over night. Denies chills and diaphoresis at this time.     A&P:  Broaden IV antibiotics with vancomycin. Ordered single dose for now given chronic renal insufficiency. Discontinue ancef.   Await blood culture results.   Will leave pressure dressing in place for now and will reassess site this afternoon.     Discussed with Dr Yun.     Daren Sanchez MD PGY7    12:30 PM  Re-assessed. Pressure dressing removed. Ecchymoses stable. Hematoma smaller. Surgical dressing clean, without bleeding or drainage.   Recommend keflex 500 mg po tid x5 days at discharge.   From EP/post device standpoint, no further inpatient requirements.   May need temporary decrease dose in warfarin as antibiotics may be increasing INR.     Daren Sanchez MD PGY7

## 2020-03-11 NOTE — PLAN OF CARE
"Plan of care discussed with patient. Patient is free of fall/trauma/injury. Denies CP, SOB, or pain/discomfort. Monitor showing SR/ST. Left ant CW pressure dressing in place. Large bruise noted and marked. All questions addressed. Temp 100.6 at 0400. Notifed HTS. Labs and blood cx, CXR, and resp infection panel ordered. Pt also says he is feeling "achy"  "

## 2020-03-11 NOTE — SUBJECTIVE & OBJECTIVE
Interval History: Events from overnight reviewed. Now with fever and c/o mildly productive cough/congestion/achy feeling and URI type symptoms. Denies any worsening pain from ICD site (states it actually isn't hurting, feels less swollen). States he wife had a cough and URI symptoms as welll prior to his admission. Labs/BCx/Lactate pending this AM.     Scheduled Meds:   allopurinoL  100 mg Oral Daily    amiodarone  400 mg Oral Daily    amLODIPine  10 mg Oral Daily    ceFEPime (MAXIPIME) IVPB  2 g Intravenous Q8H    dextromethorphan-guaifenesin  mg  1 tablet Oral BID    doxazosin  8 mg Oral QHS    fluticasone propionate  2 spray Each Nostril Daily    methIMAzole  20 mg Oral BID    pantoprazole  40 mg Oral Daily    predniSONE  30 mg Oral Once    predniSONE  60 mg Oral Daily    torsemide  40 mg Oral Daily    warfarin  2.5 mg Oral Daily     PRN Meds:acetaminophen, diphenhydrAMINE, senna-docusate 8.6-50 mg, traMADoL, zolpidem    Review of patient's allergies indicates:   Allergen Reactions    Lisinopril Anaphylaxis    Hydralazine analogues      Chronic constipation, impotence, dizziness     Objective:     Vital Signs (Most Recent):  Temp: 99.3 °F (37.4 °C) (03/11/20 0724)  Pulse: (!) 126 (03/11/20 0730)  Resp: 20 (03/11/20 0724)  BP: (!) 86/0 (03/11/20 0724)  SpO2: 97 % (03/11/20 1017) Vital Signs (24h Range):  Temp:  [98.5 °F (36.9 °C)-100.6 °F (38.1 °C)] 99.3 °F (37.4 °C)  Pulse:  [] 126  Resp:  [18-20] 20  SpO2:  [94 %-98 %] 97 %  BP: ()/(0-70) 86/0     Patient Vitals for the past 72 hrs (Last 3 readings):   Weight   03/10/20 0732 121.9 kg (268 lb 11.9 oz)   03/09/20 1512 123.4 kg (272 lb 0.8 oz)   03/09/20 0744 120.2 kg (265 lb)     Body mass index is 35.46 kg/m².      Intake/Output Summary (Last 24 hours) at 3/11/2020 1111  Last data filed at 3/11/2020 1000  Gross per 24 hour   Intake 480 ml   Output 3425 ml   Net -2945 ml       Hemodynamic Parameters:       Telemetry: SR -  ST    Physical Exam   Constitutional: He is oriented to person, place, and time. He appears well-developed and well-nourished.   HENT:   Head: Normocephalic and atraumatic.   Eyes: Pupils are equal, round, and reactive to light. EOM are normal.   Neck: Normal range of motion. Neck supple. No JVD present. No thyromegaly present.   Cardiovascular: Normal rate and regular rhythm.   Smooth VAD hum   Pulmonary/Chest: Effort normal and breath sounds normal.   Abdominal: Soft. Bowel sounds are normal.   Musculoskeletal: Normal range of motion. He exhibits no edema.   Neurological: He is alert and oriented to person, place, and time.   Skin: Skin is warm and dry. Capillary refill takes 2 to 3 seconds.   Psychiatric: He has a normal mood and affect. His behavior is normal. Judgment and thought content normal.       Significant Labs:  CBC:  Recent Labs   Lab 03/09/20  1006 03/10/20  0453 03/11/20  0359   WBC  --  9.76 11.84   RBC  --  4.72 5.14   HGB  --  12.4* 13.2*   HCT 35* 42.1 45.3   PLT  --  187 193   MCV  --  89 88   MCH  --  26.3* 25.7*   MCHC  --  29.5* 29.1*     BNP:  Recent Labs   Lab 03/11/20 0359   *     CMP:  Recent Labs   Lab 03/10/20  0453 03/11/20  0359   * 86   CALCIUM 8.9 9.8   ALBUMIN  --  3.5   PROT  --  6.9    141   K 3.9 3.9   CO2 30* 27    99   BUN 28* 27*   CREATININE 1.5* 1.6*   ALKPHOS  --  92   ALT  --  22   AST  --  31   BILITOT  --  0.5      Coagulation:   Recent Labs   Lab 03/09/20  0731 03/10/20  0453 03/11/20  0359   INR 2.3* 2.4* 2.8*   APTT 34.2*  --   --      LDH:  Recent Labs   Lab 03/10/20  0453 03/11/20  0359   * 529*     Microbiology:  Microbiology Results (last 7 days)     Procedure Component Value Units Date/Time    Respiratory Infection Panel (PCR), Nasopharyngeal [[id number]] Collected:  03/11/20 0922    Order Status:  Sent Specimen:  Nasopharyngeal Swab Updated:  03/11/20 0928    Respiratory Infection Panel (PCR), Nasopharyngeal [[id number]]  Collected:  03/11/20 0643    Order Status:  Canceled Specimen:  Nasopharyngeal Swab     Blood culture [[id number]] Collected:  03/11/20 0431    Order Status:  Sent Specimen:  Blood Updated:  03/11/20 0449    Blood culture [[id number]] Collected:  03/11/20 0431    Order Status:  Sent Specimen:  Blood Updated:  03/11/20 0449          I have reviewed all pertinent labs within the past 24 hours.    Estimated Creatinine Clearance: 73 mL/min (A) (based on SCr of 1.6 mg/dL (H)).    Diagnostic Results:  I have reviewed all pertinent imaging results/findings within the past 24 hours.

## 2020-03-12 LAB
ANION GAP SERPL CALC-SCNC: 11 MMOL/L (ref 8–16)
BASOPHILS # BLD AUTO: 0.01 K/UL (ref 0–0.2)
BASOPHILS NFR BLD: 0.1 % (ref 0–1.9)
BUN SERPL-MCNC: 30 MG/DL (ref 6–20)
CALCIUM SERPL-MCNC: 9.7 MG/DL (ref 8.7–10.5)
CHLORIDE SERPL-SCNC: 95 MMOL/L (ref 95–110)
CO2 SERPL-SCNC: 34 MMOL/L (ref 23–29)
CREAT SERPL-MCNC: 1.6 MG/DL (ref 0.5–1.4)
DIFFERENTIAL METHOD: ABNORMAL
EOSINOPHIL # BLD AUTO: 0 K/UL (ref 0–0.5)
EOSINOPHIL NFR BLD: 0.1 % (ref 0–8)
ERYTHROCYTE [DISTWIDTH] IN BLOOD BY AUTOMATED COUNT: 16.2 % (ref 11.5–14.5)
EST. GFR  (AFRICAN AMERICAN): 56 ML/MIN/1.73 M^2
EST. GFR  (NON AFRICAN AMERICAN): 48.5 ML/MIN/1.73 M^2
GLUCOSE SERPL-MCNC: 73 MG/DL (ref 70–110)
HCT VFR BLD AUTO: 41.6 % (ref 40–54)
HGB BLD-MCNC: 12.2 G/DL (ref 14–18)
IMM GRANULOCYTES # BLD AUTO: 0.1 K/UL (ref 0–0.04)
IMM GRANULOCYTES NFR BLD AUTO: 1 % (ref 0–0.5)
INR PPP: 2.1 (ref 0.8–1.2)
LDH SERPL L TO P-CCNC: 560 U/L (ref 110–260)
LYMPHOCYTES # BLD AUTO: 0.6 K/UL (ref 1–4.8)
LYMPHOCYTES NFR BLD: 6.7 % (ref 18–48)
MAGNESIUM SERPL-MCNC: 2 MG/DL (ref 1.6–2.6)
MCH RBC QN AUTO: 26 PG (ref 27–31)
MCHC RBC AUTO-ENTMCNC: 29.3 G/DL (ref 32–36)
MCV RBC AUTO: 89 FL (ref 82–98)
MONOCYTES # BLD AUTO: 1.4 K/UL (ref 0.3–1)
MONOCYTES NFR BLD: 14.3 % (ref 4–15)
NEUTROPHILS # BLD AUTO: 7.4 K/UL (ref 1.8–7.7)
NEUTROPHILS NFR BLD: 77.8 % (ref 38–73)
NRBC BLD-RTO: 0 /100 WBC
PHOSPHATE SERPL-MCNC: 3.8 MG/DL (ref 2.7–4.5)
PLATELET # BLD AUTO: 160 K/UL (ref 150–350)
PMV BLD AUTO: 11 FL (ref 9.2–12.9)
POTASSIUM SERPL-SCNC: 3.4 MMOL/L (ref 3.5–5.1)
PROTHROMBIN TIME: 20.5 SEC (ref 9–12.5)
RBC # BLD AUTO: 4.7 M/UL (ref 4.6–6.2)
SODIUM SERPL-SCNC: 140 MMOL/L (ref 136–145)
WBC # BLD AUTO: 9.53 K/UL (ref 3.9–12.7)

## 2020-03-12 PROCEDURE — 63600175 PHARM REV CODE 636 W HCPCS: Performed by: INTERNAL MEDICINE

## 2020-03-12 PROCEDURE — 25000003 PHARM REV CODE 250: Performed by: PHYSICIAN ASSISTANT

## 2020-03-12 PROCEDURE — 85610 PROTHROMBIN TIME: CPT

## 2020-03-12 PROCEDURE — 25000003 PHARM REV CODE 250: Performed by: STUDENT IN AN ORGANIZED HEALTH CARE EDUCATION/TRAINING PROGRAM

## 2020-03-12 PROCEDURE — 25000003 PHARM REV CODE 250: Performed by: INTERNAL MEDICINE

## 2020-03-12 PROCEDURE — 83735 ASSAY OF MAGNESIUM: CPT

## 2020-03-12 PROCEDURE — 85025 COMPLETE CBC W/AUTO DIFF WBC: CPT

## 2020-03-12 PROCEDURE — 83615 LACTATE (LD) (LDH) ENZYME: CPT

## 2020-03-12 PROCEDURE — 99233 SBSQ HOSP IP/OBS HIGH 50: CPT | Mod: ,,, | Performed by: PHYSICIAN ASSISTANT

## 2020-03-12 PROCEDURE — 93750 INTERROGATION VAD IN PERSON: CPT | Mod: ,,, | Performed by: INTERNAL MEDICINE

## 2020-03-12 PROCEDURE — 80048 BASIC METABOLIC PNL TOTAL CA: CPT

## 2020-03-12 PROCEDURE — 36415 COLL VENOUS BLD VENIPUNCTURE: CPT

## 2020-03-12 PROCEDURE — 27000248 HC VAD-ADDITIONAL DAY

## 2020-03-12 PROCEDURE — 99233 PR SUBSEQUENT HOSPITAL CARE,LEVL III: ICD-10-PCS | Mod: ,,, | Performed by: PHYSICIAN ASSISTANT

## 2020-03-12 PROCEDURE — 63600175 PHARM REV CODE 636 W HCPCS: Performed by: STUDENT IN AN ORGANIZED HEALTH CARE EDUCATION/TRAINING PROGRAM

## 2020-03-12 PROCEDURE — 99233 SBSQ HOSP IP/OBS HIGH 50: CPT | Mod: ,,, | Performed by: INTERNAL MEDICINE

## 2020-03-12 PROCEDURE — 84100 ASSAY OF PHOSPHORUS: CPT

## 2020-03-12 PROCEDURE — 25000003 PHARM REV CODE 250: Performed by: NURSE PRACTITIONER

## 2020-03-12 PROCEDURE — 20600001 HC STEP DOWN PRIVATE ROOM

## 2020-03-12 PROCEDURE — 93750 PR INTERROGATE VENT ASSIST DEV, IN PERSON, W PHYSICIAN ANALYSIS: ICD-10-PCS | Mod: ,,, | Performed by: INTERNAL MEDICINE

## 2020-03-12 PROCEDURE — 99233 PR SUBSEQUENT HOSPITAL CARE,LEVL III: ICD-10-PCS | Mod: ,,, | Performed by: INTERNAL MEDICINE

## 2020-03-12 RX ORDER — ISOSORBIDE DINITRATE 10 MG/1
20 TABLET ORAL 3 TIMES DAILY
Status: DISCONTINUED | OUTPATIENT
Start: 2020-03-12 | End: 2020-03-13 | Stop reason: HOSPADM

## 2020-03-12 RX ORDER — WARFARIN SODIUM 5 MG/1
5 TABLET ORAL DAILY
Status: DISCONTINUED | OUTPATIENT
Start: 2020-03-12 | End: 2020-03-13 | Stop reason: HOSPADM

## 2020-03-12 RX ORDER — POTASSIUM CHLORIDE 20 MEQ/1
40 TABLET, EXTENDED RELEASE ORAL ONCE
Status: COMPLETED | OUTPATIENT
Start: 2020-03-12 | End: 2020-03-12

## 2020-03-12 RX ADMIN — CEFEPIME 2 G: 2 INJECTION, POWDER, FOR SOLUTION INTRAVENOUS at 05:03

## 2020-03-12 RX ADMIN — AMIODARONE HYDROCHLORIDE 400 MG: 200 TABLET ORAL at 09:03

## 2020-03-12 RX ADMIN — OSELTAMIVIR PHOSPHATE 75 MG: 75 CAPSULE ORAL at 09:03

## 2020-03-12 RX ADMIN — FLUTICASONE PROPIONATE 100 MCG: 50 SPRAY, METERED NASAL at 09:03

## 2020-03-12 RX ADMIN — ISOSORBIDE DINITRATE 20 MG: 10 TABLET ORAL at 09:03

## 2020-03-12 RX ADMIN — PANTOPRAZOLE SODIUM 40 MG: 40 TABLET, DELAYED RELEASE ORAL at 09:03

## 2020-03-12 RX ADMIN — VANCOMYCIN HYDROCHLORIDE 1250 MG: 1.25 INJECTION, POWDER, LYOPHILIZED, FOR SOLUTION INTRAVENOUS at 12:03

## 2020-03-12 RX ADMIN — POTASSIUM CHLORIDE 40 MEQ: 20 TABLET, EXTENDED RELEASE ORAL at 09:03

## 2020-03-12 RX ADMIN — PREDNISONE 60 MG: 20 TABLET ORAL at 09:03

## 2020-03-12 RX ADMIN — CEFEPIME 2 G: 2 INJECTION, POWDER, FOR SOLUTION INTRAVENOUS at 12:03

## 2020-03-12 RX ADMIN — ISOSORBIDE DINITRATE 20 MG: 10 TABLET ORAL at 02:03

## 2020-03-12 RX ADMIN — DOXAZOSIN 8 MG: 4 TABLET ORAL at 09:03

## 2020-03-12 RX ADMIN — AMLODIPINE BESYLATE 10 MG: 10 TABLET ORAL at 09:03

## 2020-03-12 RX ADMIN — METHIMAZOLE 20 MG: 10 TABLET ORAL at 09:03

## 2020-03-12 RX ADMIN — CEFEPIME 2 G: 2 INJECTION, POWDER, FOR SOLUTION INTRAVENOUS at 09:03

## 2020-03-12 RX ADMIN — ALLOPURINOL 100 MG: 100 TABLET ORAL at 09:03

## 2020-03-12 RX ADMIN — WARFARIN SODIUM 5 MG: 5 TABLET ORAL at 05:03

## 2020-03-12 RX ADMIN — TORSEMIDE 40 MG: 20 TABLET ORAL at 09:03

## 2020-03-12 RX ADMIN — GUAIFENESIN AND DEXTROMETHORPHAN HYDROBROMIDE 1 TABLET: 600; 30 TABLET, EXTENDED RELEASE ORAL at 09:03

## 2020-03-12 NOTE — ASSESSMENT & PLAN NOTE
- S/P Medtronic BiV ICD 2014 with h/o multiple ICD shocks  - S/P ICD gen change (BiV -> dual chamber ICD) and addition of new RV/ICD lead along with DFT's 3/9 (Dr. Yun)  - NO HEPARIN PRODUCTS  - Keflex 500 mg TID for 5 days at discharge

## 2020-03-12 NOTE — SUBJECTIVE & OBJECTIVE
**Interval History: T amx down to 100.2 past 24 hours. Blood cxs with NGTD, testing +ve for Influenza A subtype H1-2009 - now on Tamiflu in addition to Vanc/Cefepime. Fatigued, cough productive of whitish sputum. ICD site with less ecchymosis, no redness, tenderness or drainage    Continuous Infusions:  Scheduled Meds:   allopurinoL  100 mg Oral Daily    amiodarone  400 mg Oral Daily    amLODIPine  10 mg Oral Daily    ceFEPime (MAXIPIME) IVPB  2 g Intravenous Q8H    dextromethorphan-guaifenesin  mg  1 tablet Oral BID    doxazosin  8 mg Oral QHS    fluticasone propionate  2 spray Each Nostril Daily    isosorbide dinitrate  20 mg Oral TID    methIMAzole  20 mg Oral BID    oseltamivir  75 mg Oral BID    pantoprazole  40 mg Oral Daily    predniSONE  30 mg Oral Once    predniSONE  60 mg Oral Daily    torsemide  40 mg Oral Daily    vancomycin (VANCOCIN) IVPB  1,250 mg Intravenous Q12H    warfarin  5 mg Oral Daily     PRN Meds:acetaminophen, diphenhydrAMINE, senna-docusate 8.6-50 mg, traMADoL, zolpidem    Review of patient's allergies indicates:   Allergen Reactions    Lisinopril Anaphylaxis    Hydralazine analogues      Chronic constipation, impotence, dizziness     Objective:     Vital Signs (Most Recent):  Temp: 99.2 °F (37.3 °C) (03/12/20 0758)  Pulse: 109 (03/12/20 0758)  Resp: 20 (03/12/20 0758)  BP: (!) 90/0 (03/12/20 0758)  SpO2: (!) 92 % (03/12/20 0758) Vital Signs (24h Range):  Temp:  [98.6 °F (37 °C)-100.2 °F (37.9 °C)] 99.2 °F (37.3 °C)  Pulse:  [] 109  Resp:  [16-20] 20  SpO2:  [92 %-98 %] 92 %  BP: ()/(0-77) 90/0     Patient Vitals for the past 72 hrs (Last 3 readings):   Weight   03/12/20 0500 119.2 kg (262 lb 12.6 oz)   03/11/20 1518 121.9 kg (268 lb 11.9 oz)   03/10/20 0732 121.9 kg (268 lb 11.9 oz)     Body mass index is 34.67 kg/m².      Intake/Output Summary (Last 24 hours) at 3/12/2020 1005  Last data filed at 3/12/2020 0900  Gross per 24 hour   Intake 480 ml    Output 3025 ml   Net -2545 ml       Hemodynamic Parameters:       Telemetry: ST with PVC's    Physical Exam   Constitutional: He is oriented to person, place, and time. He appears well-developed and well-nourished.   HENT:   Head: Normocephalic and atraumatic.   Eyes: Pupils are equal, round, and reactive to light. EOM are normal.   Neck: Normal range of motion. Neck supple. No JVD present. No thyromegaly present.   Cardiovascular: Normal rate and regular rhythm.   Smooth VAD hum   Pulmonary/Chest: Effort normal.   Few scattered coarse rales   Abdominal: Soft. Bowel sounds are normal.   Musculoskeletal: Normal range of motion. He exhibits no edema.   Neurological: He is alert and oriented to person, place, and time.   Skin: Skin is warm and dry. Capillary refill takes 2 to 3 seconds.   Psychiatric: He has a normal mood and affect. His behavior is normal. Judgment and thought content normal.       Significant Labs:  CBC:  Recent Labs   Lab 03/10/20  0453 03/11/20  0359 03/12/20  0448   WBC 9.76 11.84 9.53   RBC 4.72 5.14 4.70   HGB 12.4* 13.2* 12.2*   HCT 42.1 45.3 41.6    193 160   MCV 89 88 89   MCH 26.3* 25.7* 26.0*   MCHC 29.5* 29.1* 29.3*     BNP:  Recent Labs   Lab 03/11/20 0359   *     CMP:  Recent Labs   Lab 03/10/20  0453 03/11/20  0359 03/12/20  0448   * 86 73   CALCIUM 8.9 9.8 9.7   ALBUMIN  --  3.5  --    PROT  --  6.9  --     141 140   K 3.9 3.9 3.4*   CO2 30* 27 34*    99 95   BUN 28* 27* 30*   CREATININE 1.5* 1.6* 1.6*   ALKPHOS  --  92  --    ALT  --  22  --    AST  --  31  --    BILITOT  --  0.5  --       Coagulation:   Recent Labs   Lab 03/09/20  0731 03/10/20  0453 03/11/20  0359 03/12/20  0448   INR 2.3* 2.4* 2.8* 2.1*   APTT 34.2*  --   --   --      LDH:  Recent Labs   Lab 03/10/20  0453 03/11/20  0359 03/12/20  0448   * 529* 560*     Microbiology:  Microbiology Results (last 7 days)     Procedure Component Value Units Date/Time    Blood culture  [[id number]] Collected:  03/11/20 0431    Order Status:  Completed Specimen:  Blood Updated:  03/12/20 0613     Blood Culture, Routine No Growth to date      No Growth to date    Blood culture [[id number]] Collected:  03/11/20 0431    Order Status:  Completed Specimen:  Blood Updated:  03/12/20 0613     Blood Culture, Routine No Growth to date      No Growth to date    Respiratory Infection Panel (PCR), Nasopharyngeal [[id number]]  (Abnormal) Collected:  03/11/20 0922    Order Status:  Completed Specimen:  Nasopharyngeal Swab Updated:  03/11/20 1543     Respiratory Infection Panel Source NP Swab     Adenovirus Not Detected     Coronavirus 229E, Common Cold Virus Not Detected     Coronavirus HKU1, Common Cold Virus Not Detected     Coronavirus NL63, Common Cold Virus Not Detected     Coronavirus OC43, Common Cold Virus Not Detected     Comment: The Coronavirus strains detected in this test cause the common cold.  These strains are not the COVID-19 (novel Coronavirus)strain   associated with the respiratory disease outbreak.          Human Metapneumovirus Not Detected     Human Rhinovirus/Enterovirus Not Detected     Influenza A (subtypes H1, H1-2009,H3) Detected - subtype H1-2009     Influenza B Not Detected     Parainfluenza Virus 1 Not Detected     Parainfluenza Virus 2 Not Detected     Parainfluenza Virus 3 Not Detected     Parainfluenza Virus 4 Not Detected     Respiratory Syncytial Virus Not Detected     Bordetella Parapertussis (WC2868) Not Detected     Bordetella pertussis (ptxP) Not Detected     Chlamydia pneumoniae Not Detected     Mycoplasma pneumoniae Not Detected     Comment: Respiratory Infection Panel testing performed by Multiplex PCR.       Narrative:       For all other respiratory sources, order [id number] -  Respiratory Viral Panel by PCR (RSPFA)    Influenza A & B by Molecular [[id number]]     Order Status:  Canceled Specimen:  Nasopharyngeal Swab     Influenza A & B by Molecular [[id number]]     Order  Status:  Canceled Specimen:  Nasopharyngeal Swab     Respiratory Infection Panel (PCR), Nasopharyngeal [[id number]] Collected:  03/11/20 0643    Order Status:  Canceled Specimen:  Nasopharyngeal Swab           I have reviewed all pertinent labs within the past 24 hours.    Estimated Creatinine Clearance: 72.2 mL/min (A) (based on SCr of 1.6 mg/dL (H)).    Diagnostic Results:  I have reviewed all pertinent imaging results/findings within the past 24 hours.

## 2020-03-12 NOTE — PLAN OF CARE
Pt is A, A, Ox4. Calm, cooperative. Free of falls, trauma, and injuries. Skin intact ex DLES. Pt educated on treatment plan. Pt demonstrates and verbalizes understanding. VSS. IV abx and Tamiflu per MAR. LCW incision CDI. Plan of care reviewed with pt.

## 2020-03-12 NOTE — SUBJECTIVE & OBJECTIVE
Interval History:   Feels much improved  No fevers today  +cough  Bruising improved  No drainage or bleeding      Review of Systems   Constitutional: Positive for fatigue. Negative for activity change, appetite change, chills, diaphoresis and fever.   Respiratory: Positive for cough. Negative for shortness of breath.    Cardiovascular: Negative for chest pain and leg swelling.   Gastrointestinal: Negative for abdominal pain, diarrhea, nausea and vomiting.   Genitourinary: Negative for dysuria.   Musculoskeletal: Positive for myalgias.   Skin: Positive for color change ( bruising) and wound. Negative for pallor and rash.   Neurological: Negative for dizziness and headaches.   All other systems reviewed and are negative.    Objective:     Vital Signs (Most Recent):  Temp: 99 °F (37.2 °C) (03/12/20 1607)  Pulse: 101 (03/12/20 1607)  Resp: 18 (03/12/20 1607)  BP: (!) 82/0 (03/12/20 1607)  SpO2: (!) 93 % (03/12/20 1607) Vital Signs (24h Range):  Temp:  [98.6 °F (37 °C)-99.4 °F (37.4 °C)] 99 °F (37.2 °C)  Pulse:  [] 101  Resp:  [16-20] 18  SpO2:  [90 %-98 %] 93 %  BP: ()/(0-77) 82/0     Weight: 119.2 kg (262 lb 12.6 oz)  Body mass index is 34.67 kg/m².    Estimated Creatinine Clearance: 72.2 mL/min (A) (based on SCr of 1.6 mg/dL (H)).    Physical Exam    Significant Labs: All pertinent labs within the past 24 hours have been reviewed.    Significant Imaging: I have reviewed all pertinent imaging results/findings within the past 24 hours.

## 2020-03-12 NOTE — PROGRESS NOTES
Admit Note     Met with patient to assess needs. Patient is a 54 y.o.  male, admitted for flu, Lead revision and LVAD.  Pt received his LVAD on 3/8/18. The pt also has a history of congestive heart failure, chronic kidney dz, and gout.    The pt reports he feels terrible today and reports all of his assessment information from 1/11/20 is still correct.      Patient admitted to [facility] on 3/9/2020 .  At this time, patient presents as alert and oriented x 4, calm and communicative.  At this time, patients caregiver is not currently in attendance.     Household/Family Systems     Patient resides with patient's spouse and adult step son at     [address]  [place] [address].      Support system includes spouse and adult step son.    Patient does not have dependents that are need of being cared for.         Patients primary caregiver is self.   Pt's cell:  [phone]  Pt's work:  [phone]    Emergency contact;   Kelly Richards (spouse) [phone]    During admission, patient's caregiver plans to stay at home.  Confirmed patient and patients caregivers do have access to reliable transportation.    Cognitive Status/Learning     Patient reports reading ability as college and states patient does not have difficulty with reading, writing, seeing, hearing, comprehension, learning and memory. Pt wears glasses.    Patient reports patient learns best by reading.      Needed: No.   Highest education level: Attended College/Technical School    Vocation/Disability   .  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     Adherence     Patient reports a medium level of adherence to patients health care regimen. Pt has had difficulty in the past following his diet.     Adherence counseling and education provided. Patient verbalizes understanding.    Substance Use    Patient reports the following substance  usage.    Tobacco: no current use.  Pt quit smoking 2019  Alcohol: no current use. No alcohol since 2018.   Illicit Drugs/Non-prescribed Medications: none, patient denies any use.  Patient states clear understanding of the potential impact of substance use.  Substance abstinence/cessation counseling, education and resources provided and reviewed.     Services Utilizing/ADLS    Infusion Service: Prior to admission, patient utilizing? no  Home Health: Prior to admission, patient utilizing? no  DME: Prior to admission, yes, cane (does not currently use)   Pulmonary/Cardiac Rehab: Prior to admission, no  Dialysis:  Prior to admission, no  Transplant Specialty Pharmacy:  Prior to admission, no.    Prior to admission, patient reports patient was independent with ADLS and was driving.  Patient reports patient is not able to care for self at this time due to compromised medical condition (as documented in medical record) and physical weakness..  Patient indicates a willingness to care for self once medically cleared to do so.    Insurance/Medications    Insured by   Payor/Plan Subscr  Sex Relation Sub. Ins. ID Effective Group Num   1. AETNA - AETNA* JESUS PHELPS* 1965 Male  [id number]                                   [address]   2. [employer] - SMO* JESUS PHELPS LELAND* 1965 Male  [id number]                                    [address]      Primary Insurance (for UNOS reporting): Private Insurance  Secondary Insurance (for UNOS reporting): None    Patient reports patient is able to obtain and afford medications at this time and at time of discharge.    Living Will/Healthcare Power of     Patient states patient does not have a LW and/or HCPA.   provided education regarding LW and HCPA and the completion of forms.    Coping/Mental Health    Patient is coping adequately with the aid of  family members.  Patient denies mental  health difficulties.   Pt reports he does not feel well and is concerned about having the flu.  Pt reports no additional needs at this time.       Discharge Planning    At time of discharge, patient plans to return to patient's home under the care of self and spouse.  Patients spouse will transport patient.  Per rounds today, expected discharge date has not been medically determined at this time. Patient and patients caregiver  verbalize understanding and are involved in treatment planning and discharge process.    Additional Concerns    Patient is being followed for needs, education, resources, information, emotional support, supportive counseling, and for supportive and skilled discharge plan of care.  providing ongoing psychosocial support, education, resources and d/c planning as needed.  SW remains available. Patient denies additional needs and/or concerns at this time. Patient verbalizes understanding and agreement with information reviewed, social work availability, and how to access available resources as needed.

## 2020-03-12 NOTE — PROGRESS NOTES
03/12/2020  Fitz Christianson    Current provider:  Guerda Bautista MD      I, Fitz Christianson, rounded on Tim Richards to ensure all mechanical assist device settings (IABP or VAD) were appropriate and all parameters were within limits.  I was able to ensure all back up equipment was present, the staff had no issues, and the Perfusion Department daily rounding was complete.    10:50 AM

## 2020-03-12 NOTE — ASSESSMENT & PLAN NOTE
- Per last endocrine note- Dr. Piedra:  Increased prednisone back up to 60 mg once per day. Resume methimazole at 20 mg twice per day based on TFTs  - TSH 0.023 and FT4 1.73 on 3/11  - Appreciate Endocrine's help - continue Prednisone 60 mg qd and Methimazole 20 mg bid for now

## 2020-03-12 NOTE — ASSESSMENT & PLAN NOTE
- BCx -ve, RIP +ve for influenza A subtype H1-2009. CXR reviewed with no obvious sign of infection/PNA. UA negative.   - ID consulted and continue broad spectrum abx for now. Tamiflu started 3/11

## 2020-03-12 NOTE — ASSESSMENT & PLAN NOTE
53 y/o male with DCM s/p LVAD as DT, HTN, ICD 2014 presents for ICD generator exchange and placement of new ICD lead. He developed fevers of 100.6 overnight. He was given dose of cefazolin and is now on vancomycin. He was noted to have small hematoma of chest near surgical incision. EP following. Blood cultures are pending. CXR negative. pro calcitonin negative. UA with 2 WBCs. CRP 50.7. Afebrile today.  We are consulted for abx recommendations.       1. Continue vancomycin and cefepime 2gq8hr  2. Influenza A positive. Continue tamiflu today x 5 days   3. EP following, no drainage from incision site. Hematoma improved   4. Discharge on doxycycline 100 mg po bid and cefadroxil 1g po bid to complete total of 7 days (Day 2 of vancomycin& cefepime)   5. ID will sign off. Please call if with questions

## 2020-03-12 NOTE — PROGRESS NOTES
03/11/20 2330   Vital Signs   BP (!) 84/0   BP Method Doppler     MD notified. No orders given for BP. Pt requesting for cough, MD states that bc it is the flu, nothing to give at this moment. Will continue to monitor.

## 2020-03-12 NOTE — PLAN OF CARE
Pt is A, A, Ox4. Calm, cooperative. Free of falls, trauma, and injuries. Skin intact ex LCW incision bruising and DLES. Pt educated on treatment plan. Pt demonstrates and verbalizes understanding. VSS.  BP slightly elevated, isosorbide scheduled. IV Vanc and Cefepime per MAR. VAD HM 2, kit dressing QOD. Tylenol given for HA, resolved. Plan of care reviewed with pt.

## 2020-03-12 NOTE — ASSESSMENT & PLAN NOTE
- S/P DT HM 2 3/8/18 with outflow graft exchange 3/9/18 (Dr. Kaufman)  - Current speed 9800 (decreased at clinic visit 2/27 with plan to repeat echo 3/26  - INR goal 2.0 - 3.0. INR is 2.1 today - continue Coumadin  - LDH stable today at 560  (baseline seems to be 300's - 500's)  - Doppler goal is 60-80. Dopplers have been as high as 96. Continue Norvasc and Doxazosin. Increase Isordil. Has ACE and Hydralazine allergies    Procedure: Device Interrogation Including analysis of device parameters  Current Settings: Ventricular Assist Device  Review of device function is stable/unstable     TXP LVAD INTERROGATIONS 3/12/2020 3/12/2020 3/11/2020 3/11/2020 3/11/2020 3/11/2020 3/11/2020   Type HeartMate II HeartMate II HeartMate II HeartMate II HeartMate II HeartMate II HeartMate II   Flow 5.8 5.5 4.9 5.3 5.8 6.5 6.5   Speed 9800 9800 9800 9800 9800 9800 9800   PI 4.2 3.8 3.3 4.1 4.3 3.5 3.2   Power (Jackson) 6.7 6.7 6.2 6.3 6.6 7.1 7.1   LSL 9400 9400 9400 9400 - - 9400   Pulsatility No Pulse Intermittent pulse Intermittent pulse Pulse - - No Pulse

## 2020-03-12 NOTE — PROGRESS NOTES
[facility]  Heart Transplant  Progress Note    Patient Name: Tim Richards  MRN: [id number]  Admission Date: 3/9/2020  Hospital Length of Stay: 1 days  Attending Physician: Guerda Bautista MD  Primary Care Provider: Power Connors MD  Principal Problem:LVAD (left ventricular assist device) present    Subjective:     **Interval History: T amx down to 100.2 past 24 hours. Blood cxs with NGTD, testing +ve for Influenza A subtype H1-2009 - now on Tamiflu in addition to Vanc/Cefepime. Fatigued, cough productive of whitish sputum. ICD site with less ecchymosis, no redness, tenderness or drainage    Continuous Infusions:  Scheduled Meds:   allopurinoL  100 mg Oral Daily    amiodarone  400 mg Oral Daily    amLODIPine  10 mg Oral Daily    ceFEPime (MAXIPIME) IVPB  2 g Intravenous Q8H    dextromethorphan-guaifenesin  mg  1 tablet Oral BID    doxazosin  8 mg Oral QHS    fluticasone propionate  2 spray Each Nostril Daily    isosorbide dinitrate  20 mg Oral TID    methIMAzole  20 mg Oral BID    oseltamivir  75 mg Oral BID    pantoprazole  40 mg Oral Daily    predniSONE  30 mg Oral Once    predniSONE  60 mg Oral Daily    torsemide  40 mg Oral Daily    vancomycin (VANCOCIN) IVPB  1,250 mg Intravenous Q12H    warfarin  5 mg Oral Daily     PRN Meds:acetaminophen, diphenhydrAMINE, senna-docusate 8.6-50 mg, traMADoL, zolpidem    Review of patient's allergies indicates:   Allergen Reactions    Lisinopril Anaphylaxis    Hydralazine analogues      Chronic constipation, impotence, dizziness     Objective:     Vital Signs (Most Recent):  Temp: 99.2 °F (37.3 °C) (03/12/20 0758)  Pulse: 109 (03/12/20 0758)  Resp: 20 (03/12/20 0758)  BP: (!) 90/0 (03/12/20 0758)  SpO2: (!) 92 % (03/12/20 0758) Vital Signs (24h Range):  Temp:  [98.6 °F (37 °C)-100.2 °F (37.9 °C)] 99.2 °F (37.3 °C)  Pulse:  [] 109  Resp:  [16-20] 20  SpO2:  [92 %-98 %] 92 %  BP: ()/(0-77) 90/0     Patient Vitals  for the past 72 hrs (Last 3 readings):   Weight   03/12/20 0500 119.2 kg (262 lb 12.6 oz)   03/11/20 1518 121.9 kg (268 lb 11.9 oz)   03/10/20 0732 121.9 kg (268 lb 11.9 oz)     Body mass index is 34.67 kg/m².      Intake/Output Summary (Last 24 hours) at 3/12/2020 1005  Last data filed at 3/12/2020 0900  Gross per 24 hour   Intake 480 ml   Output 3025 ml   Net -2545 ml       Hemodynamic Parameters:       Telemetry: ST with PVC's    Physical Exam   Constitutional: He is oriented to person, place, and time. He appears well-developed and well-nourished.   HENT:   Head: Normocephalic and atraumatic.   Eyes: Pupils are equal, round, and reactive to light. EOM are normal.   Neck: Normal range of motion. Neck supple. No JVD present. No thyromegaly present.   Cardiovascular: Normal rate and regular rhythm.   Smooth VAD hum   Pulmonary/Chest: Effort normal.   Few scattered coarse rales   Abdominal: Soft. Bowel sounds are normal.   Musculoskeletal: Normal range of motion. He exhibits no edema.   Neurological: He is alert and oriented to person, place, and time.   Skin: Skin is warm and dry. Capillary refill takes 2 to 3 seconds.   Psychiatric: He has a normal mood and affect. His behavior is normal. Judgment and thought content normal.       Significant Labs:  CBC:  Recent Labs   Lab 03/10/20  0453 03/11/20  0359 03/12/20  0448   WBC 9.76 11.84 9.53   RBC 4.72 5.14 4.70   HGB 12.4* 13.2* 12.2*   HCT 42.1 45.3 41.6    193 160   MCV 89 88 89   MCH 26.3* 25.7* 26.0*   MCHC 29.5* 29.1* 29.3*     BNP:  Recent Labs   Lab 03/11/20 0359   *     CMP:  Recent Labs   Lab 03/10/20  0453 03/11/20  0359 03/12/20  0448   * 86 73   CALCIUM 8.9 9.8 9.7   ALBUMIN  --  3.5  --    PROT  --  6.9  --     141 140   K 3.9 3.9 3.4*   CO2 30* 27 34*    99 95   BUN 28* 27* 30*   CREATININE 1.5* 1.6* 1.6*   ALKPHOS  --  92  --    ALT  --  22  --    AST  --  31  --    BILITOT  --  0.5  --       Coagulation:   Recent  Labs   Lab 03/09/20  0731 03/10/20  0453 03/11/20  0359 03/12/20  0448   INR 2.3* 2.4* 2.8* 2.1*   APTT 34.2*  --   --   --      LDH:  Recent Labs   Lab 03/10/20  0453 03/11/20  0359 03/12/20  0448   * 529* 560*     Microbiology:  Microbiology Results (last 7 days)     Procedure Component Value Units Date/Time    Blood culture [[id number]] Collected:  03/11/20 0431    Order Status:  Completed Specimen:  Blood Updated:  03/12/20 0613     Blood Culture, Routine No Growth to date      No Growth to date    Blood culture [[id number]] Collected:  03/11/20 0431    Order Status:  Completed Specimen:  Blood Updated:  03/12/20 0613     Blood Culture, Routine No Growth to date      No Growth to date    Respiratory Infection Panel (PCR), Nasopharyngeal [[id number]]  (Abnormal) Collected:  03/11/20 0922    Order Status:  Completed Specimen:  Nasopharyngeal Swab Updated:  03/11/20 1543     Respiratory Infection Panel Source NP Swab     Adenovirus Not Detected     Coronavirus 229E, Common Cold Virus Not Detected     Coronavirus HKU1, Common Cold Virus Not Detected     Coronavirus NL63, Common Cold Virus Not Detected     Coronavirus OC43, Common Cold Virus Not Detected     Comment: The Coronavirus strains detected in this test cause the common cold.  These strains are not the COVID-19 (novel Coronavirus)strain   associated with the respiratory disease outbreak.          Human Metapneumovirus Not Detected     Human Rhinovirus/Enterovirus Not Detected     Influenza A (subtypes H1, H1-2009,H3) Detected - subtype H1-2009     Influenza B Not Detected     Parainfluenza Virus 1 Not Detected     Parainfluenza Virus 2 Not Detected     Parainfluenza Virus 3 Not Detected     Parainfluenza Virus 4 Not Detected     Respiratory Syncytial Virus Not Detected     Bordetella Parapertussis (ES8191) Not Detected     Bordetella pertussis (ptxP) Not Detected     Chlamydia pneumoniae Not Detected     Mycoplasma pneumoniae Not Detected     Comment:  "Respiratory Infection Panel testing performed by Multiplex PCR.       Narrative:       For all other respiratory sources, order [id number] -  Respiratory Viral Panel [facility] PCR [facility])    Influenza A & B by Molecular [[id number]]     Order Status:  Canceled Specimen:  Nasopharyngeal Swab     Influenza A & B by Molecular [[id number]]     Order Status:  Canceled Specimen:  Nasopharyngeal Swab     Respiratory Infection Panel (PCR), Nasopharyngeal [[id number]] Collected:  03/11/20 0643    Order Status:  Canceled Specimen:  Nasopharyngeal Swab           I have reviewed all pertinent labs within the past 24 hours.    Estimated Creatinine Clearance: 72.2 mL/min (A) (based on SCr of 1.6 mg/dL (H)).    Diagnostic Results:  I have reviewed all pertinent imaging results/findings within the past 24 hours.    Assessment and Plan:     Tim Richards is a 54 y.o. male with DCM EF 10% s/p LVAD (DT as of now), HTN, and VT/VF s/p medtronic crt-d (2014, Dr Chiu) who presents to [facility] for outpatient generator change (device at [facility]) and placement of new ICD lead. He currently has a single coil ICD lead. He previously had sustained VT and VF which failed multiple shocks from the device and required external defibrillation for rescue. He was in decompensated heart failure at that time. Once he was compensated, DFTs were performed which were successful. He has no fever or chills. He has been feeling subjectively "pretty good" for the last couple of days per him.      From Dr Wagner's clinic note 12/31/19:  "Tim Richards 53 yo AAM with DCM,(EF 10%, grade III DD) s/p HM II with Outflow graft changed (03/9/18) as DT, BiV-Icd Medtronic (2014), HtN, obesity comes in the Ep clinic for follow up of VT and ICD management.  He was admitted to the hospital in 10/2019 with ICD shocks. When he came to the ER he was noted to be in MMVT. He had multiple rounds of ATP that degenerated the MMVT into VF and the he was unsuccessfully shocked multiple times " ". He was eventually shocked externally to convert into sinus rhythm .  It was postulated reasons for ineffective therapy (had worsening hypervolemia week prior, length of time in the arrhythmia prior to ICD shock, IV amiodarone causing higher defibrillatory tissue threshold, or change in heart-shock vector post LVAD implant). He had a NIPS which showed successful DFT at 35J. He was discharge after being diuresed. Since discharge, he has done well clinically .   Device check noted on 12/14/19 - one episode of MMVT 270 ms that was appropriately successfully shocked. He has had multiple non sustained slower VT. Of note is he had a monitoring zone added during his admission at 133. VT 1 zone at 167 does not have ATp and goes directly to 35J shock. We will add 6 rounds of ATP before shock in that zone. (refer to device interrogation in media). Device longevity - 4 months to COURTNEY."        * LVAD (left ventricular assist device) present  - S/P DT HM 2 3/8/18 with outflow graft exchange 3/9/18 (Dr. Kaufman)  - Current speed 9800 (decreased at clinic visit 2/27 with plan to repeat echo 3/26  - INR goal 2.0 - 3.0. INR is 2.1 today - continue Coumadin  - LDH stable today at 560  (baseline seems to be 300's - 500's)  - Doppler goal is 60-80. Dopplers have been as high as 96. Continue Norvasc and Doxazosin. Increase Isordil. Has ACE and Hydralazine allergies    Procedure: Device Interrogation Including analysis of device parameters  Current Settings: Ventricular Assist Device  Review of device function is stable/unstable     TXP LVAD INTERROGATIONS 3/12/2020 3/12/2020 3/11/2020 3/11/2020 3/11/2020 3/11/2020 3/11/2020   Type HeartMate II HeartMate II HeartMate II HeartMate II HeartMate II HeartMate II HeartMate II   Flow 5.8 5.5 4.9 5.3 5.8 6.5 6.5   Speed 9800 9800 9800 9800 9800 9800 9800   PI 4.2 3.8 3.3 4.1 4.3 3.5 3.2   Power (Jackson) 6.7 6.7 6.2 6.3 6.6 7.1 7.1   LSL 9400 9400 9400 9400 - - 9400   Pulsatility No Pulse " Intermittent pulse Intermittent pulse Pulse - - No Pulse       AICD malfunction  - S/P Medtronic BiV ICD 2014 with h/o multiple ICD shocks  - S/P ICD gen change (BiV -> dual chamber ICD) and addition of new RV/ICD lead along with DFT's 3/9 (Dr. Yun)  - NO HEPARIN PRODUCTS  - Keflex 500 mg TID for 5 days at discharge    Hyperthyroidism  - Per last endocrine note- Dr. Piedra:  Increased prednisone back up to 60 mg once per day. Resume methimazole at 20 mg twice per day based on TFTs  - TSH 0.023 and FT4 1.73 on 3/11  - Appreciate Endocrine's help - continue Prednisone 60 mg qd and Methimazole 20 mg bid for now    Fever  - BCx -ve, RIP +ve for influenza A subtype H1-2009. CXR reviewed with no obvious sign of infection/PNA. UA negative.   - ID consulted and continue broad spectrum abx for now. Tamiflu [place] [id number]  Heart Transplant  [facility]

## 2020-03-12 NOTE — PROGRESS NOTES
"[facility]  Infectious Disease  Progress Note    Patient Name: Tim Richards  MRN: [id number]  Admission Date: 3/9/2020  Length of Stay: 1 days  Attending Physician: Guerda Bautista MD  Primary Care Provider: Power Connors MD    Isolation Status: Droplet  Assessment/Plan:      Fever  53 y/o male with DCM s/p LVAD as DT, HTN, ICD 2014 presents for ICD generator exchange and placement of new ICD lead. He developed fevers of 100.6 overnight. He was given dose of cefazolin and is now on vancomycin. He was noted to have small hematoma of chest near surgical incision. EP following. Blood cultures are pending. CXR negative. pro calcitonin negative. UA with 2 WBCs. CRP 50.7. Afebrile today.  We are consulted for abx recommendations.       1. Continue vancomycin and cefepime 2gq8hr  2. Influenza A positive. Continue tamiflu today x 5 days   3. EP following, no drainage from incision site. Hematoma improved   4. Discharge on doxycycline 100 mg po bid and cefadroxil 1g po bid to complete total of 7 days (Day 2 of vancomycin& cefepime)   5. ID will sign off. Please call if with questions     Thank you for your consult. I will sign off. Please contact us if you have any additional questions.    Cal Valdivia PA-C  Infectious Disease  [facility]    Subjective:     Principal Problem:LVAD (left ventricular assist device) present    HPI: 53 y/o male with DCM s/p LVAD as DT, HTN, ICD 2014 presents for ICD generator exchange and placement of new ICD lead. He developed fevers of 100.6 overnight. He was given dose of cefazolin and is now on vancomycin. He was noted to have small hematoma of chest near surgical incision. EP following. Blood cultures are pending. CXR negative. pro calcitonin negative. UA with 2 WBCs. CRP 50.7. TMax 102.3 today. We are consulted for abx recommendations.     He reports feeling much improved since abx were initiated. Reports having "flu like symptoms". States " his wife may have a cold but unsure. Denies any increase SOB. Reports having productive cough that has now resolved. Has body aches and fatigue and night sweats. Fever of 102.3 today. No diarrhea or dysuria.   Interval History:   Feels much improved  No fevers today  +cough  Bruising improved  No drainage or bleeding      Review of Systems   Constitutional: Positive for fatigue. Negative for activity change, appetite change, chills, diaphoresis and fever.   Respiratory: Positive for cough. Negative for shortness of breath.    Cardiovascular: Negative for chest pain and leg swelling.   Gastrointestinal: Negative for abdominal pain, diarrhea, nausea and vomiting.   Genitourinary: Negative for dysuria.   Musculoskeletal: Positive for myalgias.   Skin: Positive for color change ( bruising) and wound. Negative for pallor and rash.   Neurological: Negative for dizziness and headaches.   All other systems reviewed and are negative.    Objective:     Vital Signs (Most Recent):  Temp: 99 °F (37.2 °C) (03/12/20 1607)  Pulse: 101 (03/12/20 1607)  Resp: 18 (03/12/20 1607)  BP: (!) 82/0 (03/12/20 1607)  SpO2: (!) 93 % (03/12/20 1607) Vital Signs (24h Range):  Temp:  [98.6 °F (37 °C)-99.4 °F (37.4 °C)] 99 °F (37.2 °C)  Pulse:  [] 101  Resp:  [16-20] 18  SpO2:  [90 %-98 %] 93 %  BP: ()/(0-77) 82/0     Weight: 119.2 kg (262 lb 12.6 oz)  Body mass index is 34.67 kg/m².    Estimated Creatinine Clearance: 72.2 mL/min (A) (based on SCr of 1.6 mg/dL (H)).    Physical Exam    Significant Labs: All pertinent labs within the past 24 hours have been reviewed.    Significant Imaging: I have reviewed all pertinent imaging results/findings within the past 24 hours.

## 2020-03-13 ENCOUNTER — PATIENT MESSAGE (OUTPATIENT)
Dept: ELECTROPHYSIOLOGY | Facility: CLINIC | Age: 55
End: 2020-03-13

## 2020-03-13 ENCOUNTER — PATIENT MESSAGE (OUTPATIENT)
Dept: TRANSPLANT | Facility: CLINIC | Age: 55
End: 2020-03-13

## 2020-03-13 ENCOUNTER — TELEPHONE (OUTPATIENT)
Dept: ELECTROPHYSIOLOGY | Facility: CLINIC | Age: 55
End: 2020-03-13

## 2020-03-13 VITALS
TEMPERATURE: 99 F | HEART RATE: 90 BPM | OXYGEN SATURATION: 95 % | RESPIRATION RATE: 18 BRPM | WEIGHT: 260.13 LBS | SYSTOLIC BLOOD PRESSURE: 88 MMHG | BODY MASS INDEX: 34.47 KG/M2 | HEIGHT: 73 IN

## 2020-03-13 LAB
ALBUMIN SERPL BCP-MCNC: 3.1 G/DL (ref 3.5–5.2)
ALP SERPL-CCNC: 71 U/L (ref 55–135)
ALT SERPL W/O P-5'-P-CCNC: 40 U/L (ref 10–44)
ANION GAP SERPL CALC-SCNC: 14 MMOL/L (ref 8–16)
AST SERPL-CCNC: 49 U/L (ref 10–40)
BASOPHILS # BLD AUTO: 0.01 K/UL (ref 0–0.2)
BASOPHILS NFR BLD: 0.1 % (ref 0–1.9)
BILIRUB DIRECT SERPL-MCNC: 0.4 MG/DL (ref 0.1–0.3)
BILIRUB SERPL-MCNC: 0.6 MG/DL (ref 0.1–1)
BNP SERPL-MCNC: 175 PG/ML (ref 0–99)
BUN SERPL-MCNC: 28 MG/DL (ref 6–20)
CALCIUM SERPL-MCNC: 9.5 MG/DL (ref 8.7–10.5)
CHLORIDE SERPL-SCNC: 94 MMOL/L (ref 95–110)
CO2 SERPL-SCNC: 29 MMOL/L (ref 23–29)
CREAT SERPL-MCNC: 1.5 MG/DL (ref 0.5–1.4)
CRP SERPL-MCNC: 112.4 MG/L (ref 0–8.2)
DIFFERENTIAL METHOD: ABNORMAL
EOSINOPHIL # BLD AUTO: 0 K/UL (ref 0–0.5)
EOSINOPHIL NFR BLD: 0 % (ref 0–8)
ERYTHROCYTE [DISTWIDTH] IN BLOOD BY AUTOMATED COUNT: 16 % (ref 11.5–14.5)
EST. GFR  (AFRICAN AMERICAN): >60 ML/MIN/1.73 M^2
EST. GFR  (NON AFRICAN AMERICAN): 52.4 ML/MIN/1.73 M^2
GLUCOSE SERPL-MCNC: 89 MG/DL (ref 70–110)
HCT VFR BLD AUTO: 38.6 % (ref 40–54)
HGB BLD-MCNC: 11.6 G/DL (ref 14–18)
IMM GRANULOCYTES # BLD AUTO: 0.07 K/UL (ref 0–0.04)
IMM GRANULOCYTES NFR BLD AUTO: 0.8 % (ref 0–0.5)
INR PPP: 2 (ref 0.8–1.2)
LDH SERPL L TO P-CCNC: 524 U/L (ref 110–260)
LYMPHOCYTES # BLD AUTO: 0.6 K/UL (ref 1–4.8)
LYMPHOCYTES NFR BLD: 6.4 % (ref 18–48)
MAGNESIUM SERPL-MCNC: 2.2 MG/DL (ref 1.6–2.6)
MCH RBC QN AUTO: 26.4 PG (ref 27–31)
MCHC RBC AUTO-ENTMCNC: 30.1 G/DL (ref 32–36)
MCV RBC AUTO: 88 FL (ref 82–98)
MONOCYTES # BLD AUTO: 1.2 K/UL (ref 0.3–1)
MONOCYTES NFR BLD: 14 % (ref 4–15)
NEUTROPHILS # BLD AUTO: 6.8 K/UL (ref 1.8–7.7)
NEUTROPHILS NFR BLD: 78.7 % (ref 38–73)
NRBC BLD-RTO: 0 /100 WBC
PHOSPHATE SERPL-MCNC: 3.2 MG/DL (ref 2.7–4.5)
PLATELET # BLD AUTO: 175 K/UL (ref 150–350)
PMV BLD AUTO: 10.8 FL (ref 9.2–12.9)
POTASSIUM SERPL-SCNC: 3.3 MMOL/L (ref 3.5–5.1)
PREALB SERPL-MCNC: 24 MG/DL (ref 20–43)
PROT SERPL-MCNC: 6.4 G/DL (ref 6–8.4)
PROTHROMBIN TIME: 19.2 SEC (ref 9–12.5)
RBC # BLD AUTO: 4.39 M/UL (ref 4.6–6.2)
SODIUM SERPL-SCNC: 137 MMOL/L (ref 136–145)
VANCOMYCIN TROUGH SERPL-MCNC: 15.1 UG/ML (ref 10–22)
WBC # BLD AUTO: 8.64 K/UL (ref 3.9–12.7)

## 2020-03-13 PROCEDURE — 83615 LACTATE (LD) (LDH) ENZYME: CPT

## 2020-03-13 PROCEDURE — 25000003 PHARM REV CODE 250: Performed by: INTERNAL MEDICINE

## 2020-03-13 PROCEDURE — 85610 PROTHROMBIN TIME: CPT

## 2020-03-13 PROCEDURE — 80048 BASIC METABOLIC PNL TOTAL CA: CPT

## 2020-03-13 PROCEDURE — 63600175 PHARM REV CODE 636 W HCPCS: Performed by: INTERNAL MEDICINE

## 2020-03-13 PROCEDURE — 80202 ASSAY OF VANCOMYCIN: CPT

## 2020-03-13 PROCEDURE — 94761 N-INVAS EAR/PLS OXIMETRY MLT: CPT

## 2020-03-13 PROCEDURE — 93750 PR INTERROGATE VENT ASSIST DEV, IN PERSON, W PHYSICIAN ANALYSIS: ICD-10-PCS | Mod: ,,, | Performed by: INTERNAL MEDICINE

## 2020-03-13 PROCEDURE — 80076 HEPATIC FUNCTION PANEL: CPT

## 2020-03-13 PROCEDURE — 94640 AIRWAY INHALATION TREATMENT: CPT

## 2020-03-13 PROCEDURE — 63600175 PHARM REV CODE 636 W HCPCS: Performed by: STUDENT IN AN ORGANIZED HEALTH CARE EDUCATION/TRAINING PROGRAM

## 2020-03-13 PROCEDURE — 25000003 PHARM REV CODE 250: Performed by: STUDENT IN AN ORGANIZED HEALTH CARE EDUCATION/TRAINING PROGRAM

## 2020-03-13 PROCEDURE — 25000003 PHARM REV CODE 250: Performed by: PHYSICIAN ASSISTANT

## 2020-03-13 PROCEDURE — 84134 ASSAY OF PREALBUMIN: CPT

## 2020-03-13 PROCEDURE — 99233 SBSQ HOSP IP/OBS HIGH 50: CPT | Mod: ,,, | Performed by: INTERNAL MEDICINE

## 2020-03-13 PROCEDURE — 99233 PR SUBSEQUENT HOSPITAL CARE,LEVL III: ICD-10-PCS | Mod: ,,, | Performed by: INTERNAL MEDICINE

## 2020-03-13 PROCEDURE — 25000003 PHARM REV CODE 250: Performed by: NURSE PRACTITIONER

## 2020-03-13 PROCEDURE — 25000242 PHARM REV CODE 250 ALT 637 W/ HCPCS: Performed by: INTERNAL MEDICINE

## 2020-03-13 PROCEDURE — 93750 INTERROGATION VAD IN PERSON: CPT | Mod: ,,, | Performed by: INTERNAL MEDICINE

## 2020-03-13 PROCEDURE — 84100 ASSAY OF PHOSPHORUS: CPT

## 2020-03-13 PROCEDURE — 27000248 HC VAD-ADDITIONAL DAY

## 2020-03-13 PROCEDURE — 86140 C-REACTIVE PROTEIN: CPT

## 2020-03-13 PROCEDURE — 85025 COMPLETE CBC W/AUTO DIFF WBC: CPT

## 2020-03-13 PROCEDURE — 83880 ASSAY OF NATRIURETIC PEPTIDE: CPT

## 2020-03-13 PROCEDURE — 83735 ASSAY OF MAGNESIUM: CPT

## 2020-03-13 PROCEDURE — 36415 COLL VENOUS BLD VENIPUNCTURE: CPT

## 2020-03-13 RX ORDER — AMIODARONE HYDROCHLORIDE 400 MG/1
400 TABLET ORAL DAILY
Qty: 30 TABLET | Refills: 11 | Status: ON HOLD | OUTPATIENT
Start: 2020-03-13 | End: 2020-07-13 | Stop reason: HOSPADM

## 2020-03-13 RX ORDER — DOXAZOSIN 8 MG/1
8 TABLET ORAL DAILY
Qty: 30 TABLET | Refills: 11 | Status: ON HOLD
Start: 2020-03-13 | End: 2020-04-29 | Stop reason: HOSPADM

## 2020-03-13 RX ORDER — OSELTAMIVIR PHOSPHATE 75 MG/1
75 CAPSULE ORAL 2 TIMES DAILY
Qty: 10 CAPSULE | Refills: 0 | Status: SHIPPED | OUTPATIENT
Start: 2020-03-13 | End: 2020-03-18

## 2020-03-13 RX ORDER — DOXYCYCLINE HYCLATE 100 MG
100 TABLET ORAL EVERY 12 HOURS
Status: DISCONTINUED | OUTPATIENT
Start: 2020-03-13 | End: 2020-03-13 | Stop reason: HOSPADM

## 2020-03-13 RX ORDER — WARFARIN SODIUM 5 MG/1
TABLET ORAL
Qty: 30 TABLET | Refills: 11 | Status: ON HOLD | OUTPATIENT
Start: 2020-03-13 | End: 2020-04-29 | Stop reason: SDUPTHER

## 2020-03-13 RX ORDER — DOXYCYCLINE HYCLATE 100 MG
100 TABLET ORAL EVERY 12 HOURS
Qty: 10 TABLET | Refills: 0 | Status: SHIPPED | OUTPATIENT
Start: 2020-03-13 | End: 2020-03-18

## 2020-03-13 RX ORDER — GUAIFENESIN 600 MG/1
600 TABLET, EXTENDED RELEASE ORAL 2 TIMES DAILY
Status: DISCONTINUED | OUTPATIENT
Start: 2020-03-13 | End: 2020-03-13 | Stop reason: HOSPADM

## 2020-03-13 RX ORDER — FLUTICASONE PROPIONATE 50 MCG
2 SPRAY, SUSPENSION (ML) NASAL DAILY
Qty: 16 G | Refills: 3 | Status: SHIPPED | OUTPATIENT
Start: 2020-03-14 | End: 2020-07-23

## 2020-03-13 RX ORDER — CEFADROXIL 1000 MG/1
1 TABLET ORAL EVERY 12 HOURS
Status: DISCONTINUED | OUTPATIENT
Start: 2020-03-13 | End: 2020-03-13 | Stop reason: HOSPADM

## 2020-03-13 RX ORDER — POTASSIUM CHLORIDE 20 MEQ/1
40 TABLET, EXTENDED RELEASE ORAL 2 TIMES DAILY
Status: DISCONTINUED | OUTPATIENT
Start: 2020-03-13 | End: 2020-03-13 | Stop reason: HOSPADM

## 2020-03-13 RX ORDER — ISOSORBIDE DINITRATE 20 MG/1
20 TABLET ORAL 3 TIMES DAILY
Qty: 90 TABLET | Refills: 11 | Status: ON HOLD | OUTPATIENT
Start: 2020-03-13 | End: 2020-04-29 | Stop reason: HOSPADM

## 2020-03-13 RX ORDER — SPIRONOLACTONE 25 MG/1
25 TABLET ORAL DAILY
Status: DISCONTINUED | OUTPATIENT
Start: 2020-03-13 | End: 2020-03-13 | Stop reason: HOSPADM

## 2020-03-13 RX ORDER — CEFADROXIL 1000 MG/1
1 TABLET ORAL EVERY 12 HOURS
Qty: 10 TABLET | Refills: 0 | Status: SHIPPED | OUTPATIENT
Start: 2020-03-13 | End: 2020-04-16

## 2020-03-13 RX ORDER — SPIRONOLACTONE 25 MG/1
25 TABLET ORAL DAILY
Qty: 30 TABLET | Refills: 11 | Status: SHIPPED | OUTPATIENT
Start: 2020-03-14 | End: 2020-04-16 | Stop reason: SDUPTHER

## 2020-03-13 RX ORDER — ALBUTEROL SULFATE 2.5 MG/.5ML
2.5 SOLUTION RESPIRATORY (INHALATION) EVERY 6 HOURS PRN
Status: DISCONTINUED | OUTPATIENT
Start: 2020-03-13 | End: 2020-03-13 | Stop reason: HOSPADM

## 2020-03-13 RX ORDER — GUAIFENESIN 600 MG/1
600 TABLET, EXTENDED RELEASE ORAL 2 TIMES DAILY
Qty: 20 TABLET | Refills: 0 | Status: SHIPPED | OUTPATIENT
Start: 2020-03-13 | End: 2020-03-23

## 2020-03-13 RX ADMIN — TORSEMIDE 40 MG: 20 TABLET ORAL at 09:03

## 2020-03-13 RX ADMIN — METHIMAZOLE 20 MG: 10 TABLET ORAL at 09:03

## 2020-03-13 RX ADMIN — PANTOPRAZOLE SODIUM 40 MG: 40 TABLET, DELAYED RELEASE ORAL at 09:03

## 2020-03-13 RX ADMIN — POTASSIUM CHLORIDE 40 MEQ: 20 TABLET, EXTENDED RELEASE ORAL at 09:03

## 2020-03-13 RX ADMIN — ISOSORBIDE DINITRATE 20 MG: 10 TABLET ORAL at 09:03

## 2020-03-13 RX ADMIN — ALLOPURINOL 100 MG: 100 TABLET ORAL at 09:03

## 2020-03-13 RX ADMIN — ZOLPIDEM TARTRATE 10 MG: 5 TABLET ORAL at 01:03

## 2020-03-13 RX ADMIN — ALBUTEROL SULFATE 2.5 MG: 2.5 SOLUTION RESPIRATORY (INHALATION) at 05:03

## 2020-03-13 RX ADMIN — CEFEPIME 2 G: 2 INJECTION, POWDER, FOR SOLUTION INTRAVENOUS at 05:03

## 2020-03-13 RX ADMIN — GUAIFENESIN 600 MG: 600 TABLET, EXTENDED RELEASE ORAL at 09:03

## 2020-03-13 RX ADMIN — OSELTAMIVIR PHOSPHATE 75 MG: 75 CAPSULE ORAL at 09:03

## 2020-03-13 RX ADMIN — PREDNISONE 60 MG: 20 TABLET ORAL at 09:03

## 2020-03-13 RX ADMIN — AMLODIPINE BESYLATE 10 MG: 10 TABLET ORAL at 09:03

## 2020-03-13 RX ADMIN — DOXYCYCLINE HYCLATE 100 MG: 100 TABLET, COATED ORAL at 09:03

## 2020-03-13 RX ADMIN — FLUTICASONE PROPIONATE 100 MCG: 50 SPRAY, METERED NASAL at 09:03

## 2020-03-13 RX ADMIN — CEFADROXIL 1 G: 1 TABLET, FILM COATED ORAL at 09:03

## 2020-03-13 RX ADMIN — VANCOMYCIN HYDROCHLORIDE 1250 MG: 1.25 INJECTION, POWDER, LYOPHILIZED, FOR SOLUTION INTRAVENOUS at 01:03

## 2020-03-13 RX ADMIN — AMIODARONE HYDROCHLORIDE 400 MG: 200 TABLET ORAL at 09:03

## 2020-03-13 NOTE — PROGRESS NOTES
Pharmacokinetic Assessment Follow Up: IV Vancomycin    Vancomycin serum concentration assessment(s):    The trough level was drawn late but can be used to guide therapy at this time. The measurement is within the desired definitive target range of 15 to 20 mcg/mL.    Vancomycin Regimen Plan:    Continue regimen to Vancomycin 1250 mg IV every 12 hours with next serum trough concentration measured at 1300 prior to 4th dose on 3/14    Drug levels (last 3 results):  Recent Labs   Lab Result Units 03/13/20  0112   Vancomycin-Trough ug/mL 15.1       Pharmacy will continue to follow and monitor vancomycin.    Please contact pharmacy at extension [phone] for questions regarding this assessment.    Thank you for the consult,   Casper Mishra       Patient brief summary:  Tim Richards is a 54 y.o. male initiated on antimicrobial therapy with IV Vancomycin for treatment of opportunistic infection prophylaxis    The patient's current regimen is 1250mg every 12 hours    Drug Allergies:   Review of patient's allergies indicates:   Allergen Reactions    Lisinopril Anaphylaxis    Hydralazine analogues      Chronic constipation, impotence, dizziness       Actual Body Weight  119.2kg    Renal Function:   Estimated Creatinine Clearance: 72.2 mL/min (A) (based on SCr of 1.6 mg/dL (H)).,     Dialysis Method (if applicable):  N/A    CBC (last 72 hours):  Recent Labs   Lab Result Units 03/10/20  0453 03/11/20  0359 03/12/20  0448   WBC K/uL 9.76 11.84 9.53   Hemoglobin g/dL 12.4* 13.2* 12.2*   Hematocrit % 42.1 45.3 41.6   Platelets K/uL 187 193 160   Gran% % 90.0* 83.2* 77.8*   Lymph% % 3.0* 5.2* 6.7*   Mono% % 5.2 10.5 14.3   Eosinophil% % 0.0 0.0 0.1   Basophil% % 0.1 0.2 0.1   Differential Method  Automated Automated Automated       Metabolic Panel (last 72 hours):  Recent Labs   Lab Result Units 03/10/20  0453 03/11/20  0359 03/11/20  0454 03/12/20  0448   Sodium mmol/L 142 141  --  140   Potassium mmol/L 3.9 3.9  --  3.4*    Chloride mmol/L 100 99  --  95   CO2 mmol/L 30* 27  --  34*   Glucose mg/dL 115* 86  --  73   Glucose, UA   --   --  Negative  --    BUN, Bld mg/dL 28* 27*  --  30*   Creatinine mg/dL 1.5* 1.6*  --  1.6*   Albumin g/dL  --  3.5  --   --    Total Bilirubin mg/dL  --  0.5  --   --    Alkaline Phosphatase U/L  --  92  --   --    AST U/L  --  31  --   --    ALT U/L  --  22  --   --    Magnesium mg/dL 2.0 1.8  --  2.0   Phosphorus mg/dL 3.8 3.1  --  3.8       Vancomycin Administrations:  vancomycin given in the last 96 hours                   vancomycin 1.25 g in dextrose 5% 250 mL IVPB (ready to mix) (mg) 1,250 mg New Bag 03/13/20 0117     1,250 mg New Bag 03/12/20 1201     1,250 mg New Bag 03/11/20 2359    vancomycin 1.25 g in dextrose 5% 250 mL IVPB (ready to mix) (mg) 1,250 mg New Bag 03/11/20 0912                Microbiologic Results:  Microbiology Results (last 7 days)     Procedure Component Value Units Date/Time    Blood culture [[id number]] Collected:  03/11/20 0431    Order Status:  Completed Specimen:  Blood Updated:  03/12/20 0613     Blood Culture, Routine No Growth to date      No Growth to date    Blood culture [[id number]] Collected:  03/11/20 0431    Order Status:  Completed Specimen:  Blood Updated:  03/12/20 0613     Blood Culture, Routine No Growth to date      No Growth to date    Respiratory Infection Panel (PCR), Nasopharyngeal [[id number]]  (Abnormal) Collected:  03/11/20 0922    Order Status:  Completed Specimen:  Nasopharyngeal Swab Updated:  03/11/20 5247     Respiratory Infection Panel Source NP Swab     Adenovirus Not Detected     Coronavirus 229E, Common Cold Virus Not Detected     Coronavirus HKU1, Common Cold Virus Not Detected     Coronavirus NL63, Common Cold Virus Not Detected     Coronavirus OC43, Common Cold Virus Not Detected     Comment: The Coronavirus strains detected in this test cause the common cold.  These strains are not the COVID-19 (novel Coronavirus)strain   associated with  the respiratory disease outbreak.          Human Metapneumovirus Not Detected     Human Rhinovirus/Enterovirus Not Detected     Influenza A (subtypes H1, H1-2009,H3) Detected - subtype H1-2009     Influenza B Not Detected     Parainfluenza Virus 1 Not Detected     Parainfluenza Virus 2 Not Detected     Parainfluenza Virus 3 Not Detected     Parainfluenza Virus 4 Not Detected     Respiratory Syncytial Virus Not Detected     Bordetella Parapertussis (BN6348) Not Detected     Bordetella pertussis (ptxP) Not Detected     Chlamydia pneumoniae Not Detected     Mycoplasma pneumoniae Not Detected     Comment: Respiratory Infection Panel testing performed by Multiplex PCR.       Narrative:       For all other respiratory sources, order [id number] -  Respiratory Viral Panel by PCR (RSPFA)    Influenza A & B by Molecular [[id number]]     Order Status:  Canceled Specimen:  Nasopharyngeal Swab     Influenza A & B by Molecular [[id number]]     Order Status:  Canceled Specimen:  Nasopharyngeal Swab     Respiratory Infection Panel (PCR), Nasopharyngeal [[id number]] Collected:  03/11/20 0643    Order Status:  Canceled Specimen:  Nasopharyngeal Swab

## 2020-03-13 NOTE — PROGRESS NOTES
D/C 3/13-admitted following ICD generator change with ICD lead and pocket revision. During his hospital stay Mr. Richards tested positive for Influenza A. He was transitioned from broad spectrum antibiotics to continue tamiflu, doxycycline 100 mg po bid and cefadroxil 1g po bid to complete total of 7 days.   He was also evaluated by endocrinology with plan to continue methimazole and prednisone 60mg orally daily.  Warfarin dose is the same as PTA, 5mg orally daily except 2.5mg orally on Wednesdays.  S requests follow up Thursday with coumadin clinic.  Coumadin calendar updated per MAR.

## 2020-03-13 NOTE — NURSING
"Pt refused the dose, stated "I don't want to take anything that may mess up my heart any worse."  Reassurance given. Pt still refused.  "

## 2020-03-13 NOTE — PHYSICIAN QUERY
"PT Name: Tim Richards  MR #: [id number]    Physician Query Form - Heart  Condition Clarification     CDS/: Blas Ronquillo Jr, RN              Contact information:[email or web address]  This form is a permanent document in the medical record.     Query Date: March 13, 2020    By submitting this query, we are merely seeking further clarification of documentation. Please utilize your independent clinical judgment when addressing the question(s) below.    The medical record contains the following   Indicators     Supporting Clinical Findings Location in Medical Record   x -->175 3/11-3/13 Labs     x EF 55 yo AAM with DCM,(EF 10%, grade III DD) s/p HM II    3/10 Heart Transplant Progress Note    Radiology findings      Echo Results      "Ascites" documented     x "SOB" or "COLEMAN" documented Negative for shortness of breath.    3/11 Infectious Disease     "Hypoxia" documented     x Heart Failure documented CHF (congestive heart failure)   3/9 H&P   x "Edema" documented He exhibits no edema or deformity   3/11 Infectious Disease    x Diuretics/Meds torsemide tablet 40 mg Daily 3/9-3/13 MAR      Treatment:      Other:      Heart failure (HF) can be acute, chronic or both. It is generally further specificed as systolic, diastolic, or combined. Lastly, it is important to identify an underlying etiology if known or suspected.     Common clues to acute exacerbation:  Rapidly progressive symptoms (w/in 2 weeks of presentation), using IV diuretics to treat, using supplemental O2, pulmonary edema on Xray, MI w/in 4 weeks, and/or BNP >500    Systolic Heart Failure: is defined as chart documentation of a left ventricular ejection fraction (LVEF) less than 40%     Diastolic Heart Failure: is defined as a left ventricular ejection fraction (LVEF) greater than 40%   +      Evidence of diastolic dysfunction on echocardiography OR    Right heart catheterization wedge pressure above 12 mm Hg OR    Left heart " catheterization left ventricular end diastolic pressure 18 mm Hg or above.    References: *American Heart Association    The clinical guidelines noted below are only system guidelines, and do not replace the providers clinical judgment.     Provider, please specify the diagnosis associated with above clinical findings  Specify the Heart Failure Diagnosis     [   ] Chronic Systolic Heart Failure - Pre-existing systolic HF diagnosis.  EF < 40%  without  acute HF symptoms documented      [  x ] Chronic Combined Systolic and Diastolic Heart Failure     [   ] Other Type of Heart Failure (please specify type):     [   ] Other (please specify):     [   ] Clinically Undetermined                             Please document in your progress notes daily for the duration of treatment until resolved and include in your discharge summary.

## 2020-03-13 NOTE — PLAN OF CARE
Problem: Adult Inpatient Plan of Care  Goal: Plan of Care Review  Outcome: Ongoing, Progressing     Problem: Adjustment to Device (Ventricular Assist Device)  Goal: Optimal Adjustment to Device  Outcome: Ongoing, Progressing     Problem: Fall Injury Risk  Goal: Absence of Fall and Fall-Related Injury  Outcome: Ongoing, Progressing     Plan of care reviewed with patient. He denies questions or concerns. Pt comfortable and knowledgeable with all LVAD equipment. Voices understanding of all fall precautions.

## 2020-03-13 NOTE — PROGRESS NOTES
VANCOMYCIN DOSING BY PHARMACY DISCONTINUATION NOTE    Tim Richards is a 54 y.o. male who had been consulted for vancomycin dosing.    The pharmacy consult for vancomycin dosing has been discontinued.     Vancomycin Dosing by Pharmacy Consult will sign-off. Please reconsult if necessary. Thank you for allowing us to participate in this patient's care.       Adriana Good, PharmD  [phone]

## 2020-03-13 NOTE — PROGRESS NOTES
DISCHARGE    SW to pt's room for discharge today.  Pt presents as lying in bed, aao x4, calm, cooperative, and asking and answering questions appropriately.  Pt verbalizes understanding and agreement with plan for d/c to home today.  Pt reports his step-son will transport him home today.  Pt denies any d/c needs, and none identified by medical team.  Pt reports coping adequately at this time, and denies any needs or concerns to SW.  SW providing ongoing psychosocial and counseling support, education, resources, assistance, and discharge planning as indicated.  SW remains available.

## 2020-03-13 NOTE — ASSESSMENT & PLAN NOTE
- BCx -ve, RIP +ve for influenza A subtype H1-2009. CXR reviewed with no obvious sign of infection/PNA. UA negative.   - ID consulted. Appreciate recs. IV Vanc and Cefepime D/C'd this morning, and he is being discharged on Doxycycline 100 mg po bid and Cefadroxil 1000 mg bid to complete 7 day course of antibiotics. Tamiflu started 3/11, and he will complete this at home

## 2020-03-13 NOTE — LETTER
March 13, 2020      [facility]  [address]  [facility] [address]  Phone: [phone]  Fax: [phone]       Patient: Tim Richards   YOB: 1965  Date of Visit: 03/13/2020    To Whom It May Concern:    Shola Richards  was at [facility] on 3/9/20. He may return to work/school on 3/16/20 with restrictions. He will be unable to lift anything greater than 5lbs with his left arm until cleared by our clinic.  If you have any questions or concerns, or if I can be of further assistance, please do not hesitate to contact me.    Sincerely,    Ariela Hastings LPN

## 2020-03-13 NOTE — NURSING
Spoke to [facility] coordinator, regarding pt cough, inability to get mucinex order. I Will attempt to call fellow for order.

## 2020-03-13 NOTE — PROGRESS NOTES
DISCHARGE NOTE:    Tim Richards is a 54 y.o. male s/p HM2 lvad from 3.18 admitted following ICD generator change with ICD lead and pocket revision.     Past Medical History:   Diagnosis Date    CHF (congestive heart failure)     Dilated cardiomyopathy 1/10/2018    Disorder of kidney and ureter     CKD    Gout     HTN (hypertension)     Ventricular tachycardia (paroxysmal)        Hospital Course: During his hospital stay Mr. Richards tested positive for Influenza A. He was transitioned from broad spectrum antibiotics to continue tamiflu, doxycycline 100 mg po bid and cefadroxil 1g po bid to complete total of 7 days.     He was also evaluated by endocrinology with plan to continue methimazole and prednisone 60mg orally daily.       Allergies:   Review of patient's allergies indicates:   Allergen Reactions    Lisinopril Anaphylaxis    Hydralazine analogues      Chronic constipation, impotence, dizziness     Pharmacy Interventions/Recommendations:  1) INR Goal: 2-36    2) Antiplatelet Agents: None     3) Heparin Bridging:  UFH     4) Patient Counseling/Education:  Completed     5) INR Follow-Up/Discharge Needs:  Thursday with coumadin clinic     See list of discharge medication for dosing instructions.     Tim Richards and his caregiver verbalized their understanding and had the opportunity to ask questions.      Discharge Medications:   Tim Richards   Home Medication Instructions TRE:[id number]    Printed on:03/13/20 1001   Medication Information                      allopurinol (ZYLOPRIM) 100 MG tablet  TAKE 1 TABLET BY MOUTH EVERY DAY             amiodarone (PACERONE) 400 MG tablet  Take 1 tablet (400 mg total) by mouth once daily.             amLODIPine (NORVASC) 10 MG tablet  Take 1 tablet (10 mg total) by mouth once daily.             cefadroxil (DURICEF) 1 gram tablet  Take 1 tablet (1 g total) by mouth every 12 (twelve) hours.             doxazosin (CARDURA) 8 MG Tab  Take 1 tablet (8 mg  total) by mouth once daily.             doxycycline (VIBRA-TABS) 100 MG tablet  Take 1 tablet (100 mg total) by mouth every 12 (twelve) hours. for 5 days             ferrous gluconate (FERGON) 324 MG tablet  Take 1 tablet (324 mg total) by mouth daily with breakfast.             fluticasone propionate (FLONASE) 50 mcg/actuation nasal spray  2 sprays (100 mcg total) by Each Nostril route once daily.             guaiFENesin (MUCINEX) 600 mg 12 hr tablet  Take 1 tablet (600 mg total) by mouth 2 (two) times daily. for 10 days             isosorbide dinitrate (ISORDIL) 20 MG tablet  Take 1 tablet (20 mg total) by mouth 3 (three) times daily.             methIMAzole (TAPAZOLE) 10 MG Tab  Take 2 tablets (20 mg total) by mouth 2 (two) times daily.             oseltamivir (TAMIFLU) 75 MG capsule  Take 1 capsule (75 mg total) by mouth 2 (two) times daily. for 5 days             pantoprazole (PROTONIX) 40 MG tablet  TAKE 1 TABLET (40 MG TOTAL) BY MOUTH ONCE DAILY.             potassium chloride (K-TAB) 20 mEq  Take 2 tablets (40 mEq total) by mouth once daily.             predniSONE (DELTASONE) 20 MG tablet  Take 3 tablets (60 mg total) by mouth once daily.             sildenafil (VIAGRA) 100 MG tablet  Take 1 tablet (100 mg total) by mouth daily as needed for Erectile Dysfunction.             spironolactone (ALDACTONE) 25 MG tablet  Take 1 tablet (25 mg total) by mouth once daily.             torsemide (DEMADEX) 20 MG Tab  Take 2 tablets (40 mg total) by mouth once daily.             warfarin (COUMADIN) 5 MG tablet  Take 5mg orally daily except 2.5mg orally on Wednesdays             zolpidem (AMBIEN) 10 mg Tab  Take 1 tablet (10 mg total) by mouth nightly as needed.

## 2020-03-13 NOTE — PROGRESS NOTES
Called patient to verify hospital discharge and dose of coumadin, wife verified correct dose of coumadin, was also given next lab date, wife verbalized understanding but rescheduled lab draw to 3/20/20 since he has another appointment the clinic at [facility], appointment booked to 3/20/20

## 2020-03-13 NOTE — NURSING
"Called Dr. York. I asked if we could change the current order for dextromethorphan/Guafenesin 30/600mg to the Guafenesin 600mg. Pt has refused the current order due to concerns about his heart failure. Pt is coughing more, and slight wheezing noted.   MD stated "It is not contraindicated in heart failure."  I stated "that is not what we have been told by other doctors, but anyway why can't we change it to plain mucinex?"  He stated "It isn't helping him anyway. Nothing has helped his cough. It will just have to run its course."   I stated "but he is worse after refusing the dextromethorphan/guafenesin. He has slight wheezing."  MD stated in a slow, defined voice "wellll dooo youuu wantt to try a nebulizer?"  I stated "Sure I guess so, anything that might help.Are you going to put the order in?"   Order placed for PRN SOB nebs.  "

## 2020-03-13 NOTE — NURSING
Notified MD that Pt now has a map of 98. Also have had to increase oxygen to keep to 4LNC to keep at 93%.   Dr. York ordered me to drop Oxygen to keep sat at 88%. And he will put in an order for elevated MAP.

## 2020-03-13 NOTE — PROGRESS NOTES
03/13/2020  Fitz Christianson    Current provider:  Guerda Bautista MD      I, Fitz Christianson, rounded on Tim Richards to ensure all mechanical assist device settings (IABP or VAD) were appropriate and all parameters were within limits.  I was able to ensure all back up equipment was present, the staff had no issues, and the Perfusion Department daily rounding was complete.    2:23 PM

## 2020-03-13 NOTE — NURSING
"Called Dr. York, clarifying whether the dose of dextromethopan/guafenesin is to be given to Heart failure pt. MD stated simply "its ok."   I asked "so its safe to give it."  He stated "yes."  "

## 2020-03-13 NOTE — PROGRESS NOTES
[facility]  Heart Transplant  Progress Note    Patient Name: Tim Richards  MRN: [id number]  Admission Date: 3/9/2020  Hospital Length of Stay: 2 days  Attending Physician: Guerda Bautista MD  Primary Care Provider: Power Connors MD  Principal Problem:LVAD (left ventricular assist device) present    Subjective:     Interval History: T max past 24 hours 99.4. HR back down to 90's. Feels much better, and wants to go home. ICD pocket site stable.     Continuous Infusions:  Scheduled Meds:   allopurinoL  100 mg Oral Daily    amiodarone  400 mg Oral Daily    amLODIPine  10 mg Oral Daily    cefadroxil  1 g Oral Q12H    doxazosin  8 mg Oral QHS    doxycycline  100 mg Oral Q12H    fluticasone propionate  2 spray Each Nostril Daily    guaiFENesin  600 mg Oral BID    isosorbide dinitrate  20 mg Oral TID    methIMAzole  20 mg Oral BID    oseltamivir  75 mg Oral BID    pantoprazole  40 mg Oral Daily    potassium chloride SA  40 mEq Oral BID    predniSONE  30 mg Oral Once    predniSONE  60 mg Oral Daily    spironolactone  25 mg Oral Daily    torsemide  40 mg Oral Daily    warfarin  5 mg Oral Daily     PRN Meds:albuterol sulfate, diphenhydrAMINE, senna-docusate 8.6-50 mg, traMADoL, zolpidem    Review of patient's allergies indicates:   Allergen Reactions    Lisinopril Anaphylaxis    Hydralazine analogues      Chronic constipation, impotence, dizziness     Objective:     Vital Signs (Most Recent):  Temp: 98.6 °F (37 °C) (03/13/20 1211)  Pulse: 90 (03/13/20 1211)  Resp: 18 (03/13/20 1211)  BP: (!) 88/0 (03/13/20 0800)  SpO2: 95 % (03/13/20 1211) Vital Signs (24h Range):  Temp:  [98.2 °F (36.8 °C)-99 °F (37.2 °C)] 98.6 °F (37 °C)  Pulse:  [] 90  Resp:  [18-20] 18  SpO2:  [90 %-95 %] 95 %  BP: ()/(0-82) 88/0     Patient Vitals for the past 72 hrs (Last 3 readings):   Weight   03/13/20 0544 118 kg (260 lb 2.3 oz)   03/12/20 0500 119.2 kg (262 lb 12.6 oz)   03/11/20 1518  121.9 kg (268 lb 11.9 oz)     Body mass index is 34.32 kg/m².      Intake/Output Summary (Last 24 hours) at 3/13/2020 1230  Last data filed at 3/13/2020 0500  Gross per 24 hour   Intake 480 ml   Output 2685 ml   Net -2205 ml       Hemodynamic Parameters:       Telemetry: SR with PVC's    Physical Exam   Constitutional: He is oriented to person, place, and time. He appears well-developed and well-nourished.   HENT:   Head: Normocephalic and atraumatic.   Eyes: Pupils are equal, round, and reactive to light. EOM are normal.   Neck: Normal range of motion. Neck supple. No JVD present. No thyromegaly present.   Cardiovascular: Normal rate and regular rhythm.   Smooth VAD hum   Pulmonary/Chest: Effort normal and breath sounds normal.   Abdominal: Soft. Bowel sounds are normal.   Musculoskeletal: Normal range of motion. He exhibits no edema.   Neurological: He is alert and oriented to person, place, and time.   Skin: Skin is warm and dry. Capillary refill takes 2 to 3 seconds.   Psychiatric: He has a normal mood and affect. His behavior is normal. Judgment and thought content normal.       Significant Labs:  CBC:  Recent Labs   Lab 03/11/20 0359 03/12/20 0448 03/13/20  0403   WBC 11.84 9.53 8.64   RBC 5.14 4.70 4.39*   HGB 13.2* 12.2* 11.6*   HCT 45.3 41.6 38.6*    160 175   MCV 88 89 88   MCH 25.7* 26.0* 26.4*   MCHC 29.1* 29.3* 30.1*     BNP:  Recent Labs   Lab 03/11/20 0359 03/13/20  0403   * 175*     CMP:  Recent Labs   Lab 03/11/20 0359 03/12/20 0448 03/13/20  0403   GLU 86 73 89   CALCIUM 9.8 9.7 9.5   ALBUMIN 3.5  --  3.1*   PROT 6.9  --  6.4    140 137   K 3.9 3.4* 3.3*   CO2 27 34* 29   CL 99 95 94*   BUN 27* 30* 28*   CREATININE 1.6* 1.6* 1.5*   ALKPHOS 92  --  71   ALT 22  --  40   AST 31  --  49*   BILITOT 0.5  --  0.6      Coagulation:   Recent Labs   Lab 03/09/20  0731  03/11/20  0359 03/12/20  0448 03/13/20  0403   INR 2.3*   < > 2.8* 2.1* 2.0*   APTT 34.2*  --   --   --   --     <  > = values in this interval not displayed.     LDH:  Recent Labs   Lab 03/11/20  0359 03/12/20  0448 03/13/20  0403   * 560* 524*     Microbiology:  Microbiology Results (last 7 days)     Procedure Component Value Units Date/Time    Blood culture [[id number]] Collected:  03/11/20 0431    Order Status:  Completed Specimen:  Blood Updated:  03/13/20 0613     Blood Culture, Routine No Growth to date      No Growth to date      No Growth to date    Blood culture [[id number]] Collected:  03/11/20 0431    Order Status:  Completed Specimen:  Blood Updated:  03/13/20 0613     Blood Culture, Routine No Growth to date      No Growth to date      No Growth to date    Respiratory Infection Panel (PCR), Nasopharyngeal [[id number]]  (Abnormal) Collected:  03/11/20 0922    Order Status:  Completed Specimen:  Nasopharyngeal Swab Updated:  03/11/20 1543     Respiratory Infection Panel Source NP Swab     Adenovirus Not Detected     Coronavirus 229E, Common Cold Virus Not Detected     Coronavirus HKU1, Common Cold Virus Not Detected     Coronavirus NL63, Common Cold Virus Not Detected     Coronavirus OC43, Common Cold Virus Not Detected     Comment: The Coronavirus strains detected in this test cause the common cold.  These strains are not the COVID-19 (novel Coronavirus)strain   associated with the respiratory disease outbreak.          Human Metapneumovirus Not Detected     Human Rhinovirus/Enterovirus Not Detected     Influenza A (subtypes H1, H1-2009,H3) Detected - subtype H1-2009     Influenza B Not Detected     Parainfluenza Virus 1 Not Detected     Parainfluenza Virus 2 Not Detected     Parainfluenza Virus 3 Not Detected     Parainfluenza Virus 4 Not Detected     Respiratory Syncytial Virus Not Detected     Bordetella Parapertussis (ZL4843) Not Detected     Bordetella pertussis (ptxP) Not Detected     Chlamydia pneumoniae Not Detected     Mycoplasma pneumoniae Not Detected     Comment: Respiratory Infection Panel testing  "performed by Multiplex PCR.       Narrative:       For all other respiratory sources, order [id number] -  Respiratory Viral Panel by PCR ([facility])    Influenza A & B by Molecular [[id number]]     Order Status:  Canceled Specimen:  Nasopharyngeal Swab     Influenza A & B by Molecular [[id number]]     Order Status:  Canceled Specimen:  Nasopharyngeal Swab     Respiratory Infection Panel (PCR), Nasopharyngeal [[id number]] Collected:  03/11/20 0643    Order Status:  Canceled Specimen:  Nasopharyngeal Swab           I have reviewed all pertinent labs within the past 24 hours.    Estimated Creatinine Clearance: 76.6 mL/min (A) (based on SCr of 1.5 mg/dL (H)).    Diagnostic Results:  I have reviewed all pertinent imaging results/findings within the past 24 hours.    Assessment and Plan:     Tim Richards is a 54 y.o. male with DCM EF 10% s/p LVAD (DT as of now), HTN, and VT/VF s/p medtronic crt-d (2014, Dr Chiu) [place] [facility] [place] (device at [facility]) and placement of new ICD lead. He currently has a single coil ICD lead. He previously had sustained VT and VF which failed multiple shocks from the device and required external defibrillation for rescue. He was in decompensated heart failure at that time. Once he was compensated, DFTs were performed which were successful. He has no fever or chills. He has been feeling subjectively "pretty good" for the last couple of days per him.      From Dr Wagner's clinic note 12/31/19:  "Tim Richards 55 yo AAM with DCM,(EF 10%, grade III DD) s/p HM II with Outflow graft changed (03/9/18) as DT, BiV-Icd Medtronic (2014), HtN, obesity comes in the Ep clinic for follow up of VT and ICD management.  He was admitted to the hospital in 10/2019 with ICD shocks. When he came to the ER he was noted to be in MMVT. He had multiple rounds of ATP that degenerated the MMVT into VF and the he was unsuccessfully shocked multiple times . He was eventually shocked " "externally to convert into sinus rhythm .  It was postulated reasons for ineffective therapy (had worsening hypervolemia week prior, length of time in the arrhythmia prior to ICD shock, IV amiodarone causing higher defibrillatory tissue threshold, or change in heart-shock vector post LVAD implant). He had a NIPS which showed successful DFT at 35J. He was discharge after being diuresed. Since discharge, he has done well clinically .   Device check noted on 12/14/19 - one episode of MMVT 270 ms that was appropriately successfully shocked. He has had multiple non sustained slower VT. Of note is he had a monitoring zone added during his admission at 133. VT 1 zone at 167 does not have ATp and goes directly to 35J shock. We will add 6 rounds of ATP before shock in that zone. (refer to device interrogation in media). Device longevity - 4 months to [facility]."        * LVAD (left ventricular assist device) present  - S/P DT HM 2 3/8/18 with outflow graft exchange 3/9/18 (Dr. Kaufman)  - Current speed 9800 (decreased at clinic visit 2/27 with plan to repeat echo 3/26  - INR goal 2.0 - 3.0. INR is 2.0 today - continue Coumadin  - LDH stable today at 560  (baseline seems to be 300's - 500's)  - Doppler goal is 60-80. Dopplers have been as high as 96. Continue Norvasc and Doxazosin. Increased Isordil. Has ACE and Hydralazine allergies. Patient monitors BP at home and will continue to do so  - D/C home on Doxycycline 100 po bid and Cefadroxil 1000 mg bid to complete 7 day course of antiibiotics (IV Vanc and Cefepime D/C'd this morning). He will complete course of Tamiflu at home as well. Follow up in the device clinic 3/20 and with Dr. Rhodes 3/26    Procedure: Device Interrogation Including analysis of device parameters  Current Settings: Ventricular Assist Device  Review of device function is stable/unstable     TXP LVAD INTERROGATIONS 3/13/2020 3/13/2020 3/12/2020 3/12/2020 3/12/2020 3/12/2020 3/12/2020   Type HeartMate II HeartMate " II HeartMate II HeartMate II HeartMate II HeartMate II HeartMate II   Flow 5.4 6.6 6.1 6.0 5.3 5.4 5.8   Speed 9800 9800 9800 9800 9800 9800 9800   PI 4. 2.9 3.4 3.2 3.7 4.2 4.2   Power ([place]) 6.4 7.1 6.8 6.7 6.3 6.4 6.7   LSL 9400 9400 9400 9400 - - 9400   Pulsatility Intermittent pulse No Pulse No Pulse No Pulse - - No Pulse       AICD malfunction  - S/P Medtronic BiV ICD 2014 with h/o multiple ICD shocks  - S/P ICD gen change (BiV -> dual chamber ICD) and addition of new RV/ICD lead along with DFT's 3/9 (Dr. Yun)  - NO HEPARIN PRODUCTS      Hyperthyroidism  - Per last endocrine note- Dr. Piedra:  Increased prednisone back up to 60 mg once per day. Resume methimazole at 20 mg twice per day based on TFTs  - TSH 0.023 and FT4 1.73 on 3/11  - Appreciate Endocrine's help - continue Prednisone 60 mg qd and Methimazole 20 mg bid for now    Fever  - BCx -ve, RIP +ve for influenza A subtype H1-2009. CXR reviewed with no obvious sign of infection/PNA. UA negative.   - ID consulted. Appreciate recs. IV Vanc and Cefepime D/C'd this morning, and he is being discharged on Doxycycline 100 mg po bid and Cefadroxil 1000 mg bid to complete 7 day course of antibiotics. Tamiflu started 3/11, and he will [place] [id number]  Heart Transplant  [facility]

## 2020-03-13 NOTE — ASSESSMENT & PLAN NOTE
- S/P Medtronic BiV ICD 2014 with h/o multiple ICD shocks  - S/P ICD gen change (BiV -> dual chamber ICD) and addition of new RV/ICD lead along with DFT's 3/9 (Dr. Yun)  - NO HEPARIN PRODUCTS

## 2020-03-13 NOTE — DISCHARGE SUMMARY
"[facility]  Heart Transplant  Discharge Summary      Patient Name: Tim Richards  MRN: [id number]  Admission Date: 3/9/2020  Hospital Length of Stay: 2 days  Discharge Date and Time: 03/13/2020 2:41 PM  Attending Physician: Guerda Bautista MD   Discharging Provider: Maira Crum NP  Primary Care Provider: Power Connors MD     HPI: Tim Richards is a 54 y.o. male with DCM EF 10% s/p LVAD (DT as of now), HTN, and VT/VF s/p medtronic crt-d (2014, Dr Chiu) who [place] [facility] [place] (device at [facility]) and placement of new ICD lead. He currently has a single coil ICD lead. He previously had sustained VT and VF which failed multiple shocks from the device and required external defibrillation for rescue. He was in decompensated heart failure at that time. Once he was compensated, DFTs were performed which were successful. He has no fever or chills. He has been feeling subjectively "pretty good" for the last couple of days per him.      From Dr Wagner's clinic note 12/31/19:  "Tim Richards 55 yo AAM with DCM,(EF 10%, grade III DD) s/p HM II with Outflow graft changed (03/9/18) as DT, BiV-Icd Medtronic (2014), HtN, obesity comes in the Ep clinic for follow up of VT and ICD management.  He was admitted to the hospital in 10/2019 with ICD shocks. When he came to the ER he was noted to be in MMVT. He had multiple rounds of ATP that degenerated the MMVT into VF and the he was unsuccessfully shocked multiple times . He was eventually shocked externally to convert into sinus rhythm .  It was postulated reasons for ineffective therapy (had worsening hypervolemia week prior, length of time in the arrhythmia prior to ICD shock, IV amiodarone causing higher defibrillatory tissue threshold, or change in heart-shock vector post LVAD implant). He had a NIPS which showed successful DFT at 35J. He was discharge after being diuresed. Since discharge, he has done well " "clinically .   Device check noted on 12/14/19 - one episode of MMVT 270 ms that was appropriately successfully shocked. He has had multiple non sustained slower VT. Of note is he had a monitoring zone added during his admission at 133. VT 1 zone at 167 does not have ATp and goes directly to 35J shock. We will add 6 rounds of ATP before shock in that zone. (refer to device interrogation in media). Device longevity - 4 months to [facility]."    Procedure(s) (LRB):  REVISION, INSERTION, ELECTRODE LEAD, ICD (N/A)  REPLACEMENT, ICD GENERATOR (N/A)     Hospital Course: He underwent ICD gen change (BiV to dual chamber ICD) and addition of RV/ICD lead, DGT's and pocket revision. He developed a small hematoma at the ICD site. On POD 2, he developed a temp, so ID was consulted. Started on BSA. Blood cxs show NGTD. He did test +ve for Influenza AQ subtype H1 - 2009, and was also started on Tamiflu. Felt much better by POD 4. ICD site was stable. IV BSA were changed to Doxycycline 100 mg bid and Cefadroxil 1000 mg bid to complete a total 7 day course of antibiotics. He will also complete his Tamiflu at home. Isordil was added to med regimen to optimize BP control (already on Norvasc and Doxazosin, has allergies to both Lisinopril and Hydralazine). Mr. Richards will cotinue to monitor home BP's. He will f/u in the device clinic on 3/20, and in the VAD clinic in ~ 1 month.    Consults (From admission, onward)        Status Ordering Provider     Inpatient consult to Endocrinology  Once     Provider:  (Not yet assigned)    Completed JOSE FAN     Inpatient consult to Infectious Diseases  Once     Provider:  (Not yet assigned)    Completed JOSE FAN          Significant Diagnostic Studies: See above    Pending Diagnostic Studies:     None        Final Active Diagnoses:    Diagnosis Date Noted POA    PRINCIPAL PROBLEM:  LVAD (left ventricular assist device) present [Z95.811] 03/08/2018 Not Applicable    Influenza A H1N1 " infection [J10.1]  Yes    VF (ventricular fibrillation) [I49.01] 03/09/2020 Yes    AICD malfunction [T82.118A]  Yes    Hyperthyroidism [E05.90] 01/13/2020 Yes    Fever [R50.9] 03/27/2018 Unknown    NICM (nonischemic cardiomyopathy) [I42.8] 01/10/2018 Yes     Chronic      Problems Resolved During this Admission:      Discharged Condition: stable    Disposition: Home or Self Care    Follow Up:  Follow-up Information     Harry Yun MD In 4 months.    Specialties:  Electrophysiology, Cardiology  Contact information:  [address]man  [facility] [address]  [phone]             John Blackman - Arrhythmia In 7 days.    Specialty:  Cardiology  Why:  medtronic device and wound check  Contact information:  M[address]man  [facility] [address] [phone]  Additional information:  [facility] - 3rd Floor               Patient Instructions:      Diet Cardiac   Order Comments: With 1500 cc fluid restriction     Pelvic Rest     Sponge bath only until clinic visit     Keep surgical extremity elevated     Ice to affected area     Lifting restrictions     Activity as tolerated     Shower on day dressing removed (No bath)       Medications:  Reconciled Home Medications:      Medication List      START taking these medications    cefadroxil 1 gram tablet  Commonly known as:  DURICEF  Take 1 tablet (1 g total) by mouth every 12 (twelve) hours.     doxycycline 100 MG tablet  Commonly known as:  VIBRA-TABS  Take 1 tablet (100 mg total) by mouth every 12 (twelve) hours. for 5 days     fluticasone propionate 50 mcg/actuation nasal spray  Commonly known as:  FLONASE  2 sprays (100 mcg total) by Each Nostril route once daily.  Start taking on:  March 14, 2020     guaiFENesin 600 mg 12 hr tablet  Commonly known as:  MUCINEX  Take 1 tablet (600 mg total) by mouth 2 (two) times daily. for 10 days     isosorbide dinitrate 20 MG tablet  Commonly known as:  ISORDIL  Take 1 tablet (20 mg total) by mouth 3 (three)  times daily.     oseltamivir 75 MG capsule  Commonly known as:  TAMIFLU  Take 1 capsule (75 mg total) by mouth 2 (two) times daily. for 5 days     spironolactone 25 MG tablet  Commonly known as:  ALDACTONE  Take 1 tablet (25 mg total) by mouth once daily.  Start taking on:  March 14, 2020        CHANGE how you take these medications    amiodarone 400 MG tablet  Commonly known as:  PACERONE  Take 1 tablet (400 mg total) by mouth once daily.  What changed:  when to take this     amLODIPine 10 MG tablet  Commonly known as:  NORVASC  Take 1 tablet (10 mg total) by mouth once daily.  What changed:  when to take this     doxazosin 8 MG Tab  Commonly known as:  CARDURA  Take 1 tablet (8 mg total) by mouth once daily.  What changed:  when to take this     warfarin 5 MG tablet  Commonly known as:  COUMADIN  Take 5mg orally daily except 2.5mg orally on Wednesdays  What changed:    · how much to take  · how to take this  · when to take this  · additional instructions  · Another medication with the same name was removed. Continue taking this medication, and follow the directions you see here.        CONTINUE taking these medications    allopurinoL 100 MG tablet  Commonly known as:  ZYLOPRIM  TAKE 1 TABLET BY MOUTH EVERY DAY     ferrous gluconate 324 MG tablet  Commonly known as:  FERGON  Take 1 tablet (324 mg total) by mouth daily with breakfast.     methIMAzole 10 MG Tab  Commonly known as:  TAPAZOLE  Take 2 tablets (20 mg total) by mouth 2 (two) times daily.     pantoprazole 40 MG tablet  Commonly known as:  PROTONIX  TAKE 1 TABLET (40 MG TOTAL) BY MOUTH ONCE DAILY.     potassium chloride 20 mEq  Commonly known as:  K-TAB  Take 2 tablets (40 mEq total) by mouth once daily.     predniSONE 20 MG tablet  Commonly known as:  DELTASONE  Take 3 tablets (60 mg total) by mouth once daily.     sildenafiL 100 MG tablet  Commonly known as:  VIAGRA  Take 1 tablet (100 mg total) by mouth daily as needed for Erectile Dysfunction.      torsemide 20 MG Tab  Commonly known as:  DEMADEX  Take 2 tablets (40 mg total) by mouth once daily.     zolpidem 10 mg Tab  Commonly known as:  AMBIEN  Take 1 tablet (10 mg total) by mouth nightly as needed.        STOP taking these medications    magnesium oxide 400 mg (241.3 mg magnesium) tablet  Commonly known as:  [place] [id number]  Heart Transplant  [facility]

## 2020-03-13 NOTE — SUBJECTIVE & OBJECTIVE
Interval History: T max past 24 hours 99.4. HR back down to 90's. Feels much better, and wants to go home. ICD pocket site stable.     Continuous Infusions:  Scheduled Meds:   allopurinoL  100 mg Oral Daily    amiodarone  400 mg Oral Daily    amLODIPine  10 mg Oral Daily    cefadroxil  1 g Oral Q12H    doxazosin  8 mg Oral QHS    doxycycline  100 mg Oral Q12H    fluticasone propionate  2 spray Each Nostril Daily    guaiFENesin  600 mg Oral BID    isosorbide dinitrate  20 mg Oral TID    methIMAzole  20 mg Oral BID    oseltamivir  75 mg Oral BID    pantoprazole  40 mg Oral Daily    potassium chloride SA  40 mEq Oral BID    predniSONE  30 mg Oral Once    predniSONE  60 mg Oral Daily    spironolactone  25 mg Oral Daily    torsemide  40 mg Oral Daily    warfarin  5 mg Oral Daily     PRN Meds:albuterol sulfate, diphenhydrAMINE, senna-docusate 8.6-50 mg, traMADoL, zolpidem    Review of patient's allergies indicates:   Allergen Reactions    Lisinopril Anaphylaxis    Hydralazine analogues      Chronic constipation, impotence, dizziness     Objective:     Vital Signs (Most Recent):  Temp: 98.6 °F (37 °C) (03/13/20 1211)  Pulse: 90 (03/13/20 1211)  Resp: 18 (03/13/20 1211)  BP: (!) 88/0 (03/13/20 0800)  SpO2: 95 % (03/13/20 1211) Vital Signs (24h Range):  Temp:  [98.2 °F (36.8 °C)-99 °F (37.2 °C)] 98.6 °F (37 °C)  Pulse:  [] 90  Resp:  [18-20] 18  SpO2:  [90 %-95 %] 95 %  BP: ()/(0-82) 88/0     Patient Vitals for the past 72 hrs (Last 3 readings):   Weight   03/13/20 0544 118 kg (260 lb 2.3 oz)   03/12/20 0500 119.2 kg (262 lb 12.6 oz)   03/11/20 1518 121.9 kg (268 lb 11.9 oz)     Body mass index is 34.32 kg/m².      Intake/Output Summary (Last 24 hours) at 3/13/2020 1230  Last data filed at 3/13/2020 0500  Gross per 24 hour   Intake 480 ml   Output 2685 ml   Net -2205 ml       Hemodynamic Parameters:       Telemetry: SR with PVC's    Physical Exam   Constitutional: He is oriented to person,  place, and time. He appears well-developed and well-nourished.   HENT:   Head: Normocephalic and atraumatic.   Eyes: Pupils are equal, round, and reactive to light. EOM are normal.   Neck: Normal range of motion. Neck supple. No JVD present. No thyromegaly present.   Cardiovascular: Normal rate and regular rhythm.   Smooth VAD hum   Pulmonary/Chest: Effort normal and breath sounds normal.   Abdominal: Soft. Bowel sounds are normal.   Musculoskeletal: Normal range of motion. He exhibits no edema.   Neurological: He is alert and oriented to person, place, and time.   Skin: Skin is warm and dry. Capillary refill takes 2 to 3 seconds.   Psychiatric: He has a normal mood and affect. His behavior is normal. Judgment and thought content normal.       Significant Labs:  CBC:  Recent Labs   Lab 03/11/20 0359 03/12/20 0448 03/13/20 0403   WBC 11.84 9.53 8.64   RBC 5.14 4.70 4.39*   HGB 13.2* 12.2* 11.6*   HCT 45.3 41.6 38.6*    160 175   MCV 88 89 88   MCH 25.7* 26.0* 26.4*   MCHC 29.1* 29.3* 30.1*     BNP:  Recent Labs   Lab 03/11/20 0359 03/13/20  0403   * 175*     CMP:  Recent Labs   Lab 03/11/20 0359 03/12/20 0448 03/13/20  0403   GLU 86 73 89   CALCIUM 9.8 9.7 9.5   ALBUMIN 3.5  --  3.1*   PROT 6.9  --  6.4    140 137   K 3.9 3.4* 3.3*   CO2 27 34* 29   CL 99 95 94*   BUN 27* 30* 28*   CREATININE 1.6* 1.6* 1.5*   ALKPHOS 92  --  71   ALT 22  --  40   AST 31  --  49*   BILITOT 0.5  --  0.6      Coagulation:   Recent Labs   Lab 03/09/20  0731  03/11/20 0359 03/12/20 0448 03/13/20  0403   INR 2.3*   < > 2.8* 2.1* 2.0*   APTT 34.2*  --   --   --   --     < > = values in this interval not displayed.     LDH:  Recent Labs   Lab 03/11/20  0359 03/12/20  0448 03/13/20  0403   * 560* 524*     Microbiology:  Microbiology Results (last 7 days)     Procedure Component Value Units Date/Time    Blood culture [[id number]] Collected:  03/11/20 0431    Order Status:  Completed Specimen:  Blood Updated:   03/13/20 0613     Blood Culture, Routine No Growth to date      No Growth to date      No Growth to date    Blood culture [[id number]] Collected:  03/11/20 0431    Order Status:  Completed Specimen:  Blood Updated:  03/13/20 0613     Blood Culture, Routine No Growth to date      No Growth to date      No Growth to date    Respiratory Infection Panel (PCR), Nasopharyngeal [[id number]]  (Abnormal) Collected:  03/11/20 0922    Order Status:  Completed Specimen:  Nasopharyngeal Swab Updated:  03/11/20 1543     Respiratory Infection Panel Source NP Swab     Adenovirus Not Detected     Coronavirus 229E, Common Cold Virus Not Detected     Coronavirus HKU1, Common Cold Virus Not Detected     Coronavirus NL63, Common Cold Virus Not Detected     Coronavirus OC43, Common Cold Virus Not Detected     Comment: The Coronavirus strains detected in this test cause the common cold.  These strains are not the COVID-19 (novel Coronavirus)strain   associated with the respiratory disease outbreak.          Human Metapneumovirus Not Detected     Human Rhinovirus/Enterovirus Not Detected     Influenza A (subtypes H1, H1-2009,H3) Detected - subtype H1-2009     Influenza B Not Detected     Parainfluenza Virus 1 Not Detected     Parainfluenza Virus 2 Not Detected     Parainfluenza Virus 3 Not Detected     Parainfluenza Virus 4 Not Detected     Respiratory Syncytial Virus Not Detected     Bordetella Parapertussis (RQ2969) Not Detected     Bordetella pertussis (ptxP) Not Detected     Chlamydia pneumoniae Not Detected     Mycoplasma pneumoniae Not Detected     Comment: Respiratory Infection Panel testing performed by Multiplex PCR.       Narrative:       For all other respiratory sources, order [id number] -  Respiratory Viral Panel by PCR (RSPFA)    Influenza A & B by Molecular [[id number]]     Order Status:  Canceled Specimen:  Nasopharyngeal Swab     Influenza A & B by Molecular [[id number]]     Order Status:  Canceled Specimen:  Nasopharyngeal Swab      Respiratory Infection Panel (PCR), Nasopharyngeal [[id number]] Collected:  03/11/20 0643    Order Status:  Canceled Specimen:  Nasopharyngeal Swab           I have reviewed all pertinent labs within the past 24 hours.    Estimated Creatinine Clearance: 76.6 mL/min (A) (based on SCr of 1.5 mg/dL (H)).    Diagnostic Results:  I have reviewed all pertinent imaging results/findings within the past 24 hours.

## 2020-03-16 LAB
BACTERIA BLD CULT: NORMAL
BACTERIA BLD CULT: NORMAL

## 2020-03-17 ENCOUNTER — TELEPHONE (OUTPATIENT)
Dept: ELECTROPHYSIOLOGY | Facility: CLINIC | Age: 55
End: 2020-03-17

## 2020-03-17 NOTE — LETTER
March 17, 2020    Tim Richards  [address]   [place] [address]           To Whom it may concern:                                  Please excuse Mr. Richards from work for 7 days due to flu symptoms. He may return to work on 3/24. If you have any questions or concerns, please contact our office at [phone]. Thank you          Ariela Hastings, KURT  [phone]  [facility]   [address] [place] [address]

## 2020-03-17 NOTE — TELEPHONE ENCOUNTER
Spoke with Mr. Richards. He requested a work excuse for 7 days due to flu symptoms. He is still feeling weak but states he is starting to feel better but not enough to return to work. Letter sent.

## 2020-03-17 NOTE — TELEPHONE ENCOUNTER
I have confirmed with pt I will be faxing disability form ----- Message from Rocio Cormier sent at 3/17/2020  9:32 AM CDT -----  Contact: Pt called   Pt returning call regarding a letter to return back to work. Please call pt @ [phone]. Thank you.

## 2020-03-17 NOTE — PHYSICIAN QUERY
PT Name: Tim Richards  MR #: [id number]    Physician Query Form - Relationship to Procedure Clarification     CDS/: Blas Ronquillo Jr, RN              Contact information:[email or web address]     This form is a permanent document in the medical record.     Query Date: March 17, 2020      By submitting this query, we are merely seeking further clarification of documentation. Please utilize your independent clinical judgment when addressing the question(s) below.    The Medical record contains the following:  Supporting Clinical Findings Location in Medical Record    Procedures   REPLACEMENT, ICD GENERATOR  REVISION, INSERTION, ELECTRODE LEAD, ICD    Dressing to chest wall is CDI. No active bleeding. No hematoma noted    From EP standpoint, we would like to watch him until the afternoon. We will reassess small pocket hematoma this afteroon.    warfarin (COUMADIN) tablet 5 mg daily     INR goal 2.0 - 3.0. INR is 2.4 today - continue Coumadin    12:30 PM  Re-assessed. Pressure dressing removed. Ecchymoses stable. Hematoma smaller. Surgical dressing clean, without bleeding or drainage.     He underwent ICD gen change (BiV to dual chamber ICD) and addition of RV/ICD lead, DGT's and pocket revision. He developed a small hematoma at the ICD site 3/9 EP Procedure Note        3/9 Nursing Progress Note      3/10 Arrythmia Plan of Care Note        3/9-3/11 MAR    3/10 Heart Transplant Progress Note      3/11 Arrythmia Plan of Care Note          3/13 Discharge Summary       Please clarify if the                           Hematoma                  Is           (Please Byron all that Apply)  [  ] Present, but not a complication of the procedure     [  ] Incidental finding, not clinically significant     [  ] Complication of the procedure     [  ] Due to Coumadin      [  ] Other (please specify): __________________     [ x ] Clinically Undetermined

## 2020-03-17 NOTE — TELEPHONE ENCOUNTER
Called pt regarding his request for a note to be excused for work. Spoke with pt's wife who says that he already received a note from Dr. Bautista.

## 2020-03-19 ENCOUNTER — TELEPHONE (OUTPATIENT)
Dept: ELECTROPHYSIOLOGY | Facility: CLINIC | Age: 55
End: 2020-03-19

## 2020-03-19 ENCOUNTER — PATIENT MESSAGE (OUTPATIENT)
Dept: TRANSPLANT | Facility: CLINIC | Age: 55
End: 2020-03-19

## 2020-03-19 ENCOUNTER — PATIENT MESSAGE (OUTPATIENT)
Dept: ENDOCRINOLOGY | Facility: CLINIC | Age: 55
End: 2020-03-19

## 2020-03-19 DIAGNOSIS — F19.982 SUBSTANCE OR MEDICATION-INDUCED SLEEP DISORDER, INSOMNIA TYPE: ICD-10-CM

## 2020-03-19 DIAGNOSIS — E05.90 HYPERTHYROIDISM: Primary | ICD-10-CM

## 2020-03-19 RX ORDER — ZOLPIDEM TARTRATE 10 MG/1
10 TABLET ORAL NIGHTLY PRN
Qty: 7 TABLET | Refills: 0 | Status: SHIPPED | OUTPATIENT
Start: 2020-03-19 | End: 2020-03-23 | Stop reason: SDUPTHER

## 2020-03-19 NOTE — TELEPHONE ENCOUNTER
Spoke w/ pt & informed him that his wound check appt was still on for 3/20.  ----- Message from Carissa Anguiano sent at 3/19/2020  1:29 PM CDT -----  Contact: Patient  The Pt is calling to make sure that he still has his appointment tomorrow. Please call him back @ [phone]. Thanks, Carissa

## 2020-03-20 ENCOUNTER — TELEPHONE (OUTPATIENT)
Dept: INTERNAL MEDICINE | Facility: CLINIC | Age: 55
End: 2020-03-20

## 2020-03-20 ENCOUNTER — CLINICAL SUPPORT (OUTPATIENT)
Dept: CARDIOLOGY | Facility: HOSPITAL | Age: 55
End: 2020-03-20
Attending: INTERNAL MEDICINE
Payer: COMMERCIAL

## 2020-03-20 ENCOUNTER — TELEPHONE (OUTPATIENT)
Dept: TRANSPLANT | Facility: CLINIC | Age: 55
End: 2020-03-20

## 2020-03-20 ENCOUNTER — PATIENT MESSAGE (OUTPATIENT)
Dept: INTERNAL MEDICINE | Facility: CLINIC | Age: 55
End: 2020-03-20

## 2020-03-20 ENCOUNTER — ANTI-COAG VISIT (OUTPATIENT)
Dept: CARDIOLOGY | Facility: CLINIC | Age: 55
End: 2020-03-20
Payer: COMMERCIAL

## 2020-03-20 ENCOUNTER — TELEPHONE (OUTPATIENT)
Dept: ELECTROPHYSIOLOGY | Facility: CLINIC | Age: 55
End: 2020-03-20

## 2020-03-20 ENCOUNTER — DOCUMENTATION ONLY (OUTPATIENT)
Dept: CARDIOLOGY | Facility: HOSPITAL | Age: 55
End: 2020-03-20

## 2020-03-20 DIAGNOSIS — F19.982 SUBSTANCE OR MEDICATION-INDUCED SLEEP DISORDER, INSOMNIA TYPE: ICD-10-CM

## 2020-03-20 DIAGNOSIS — Z95.810 AUTOMATIC IMPLANTABLE CARDIAC DEFIBRILLATOR IN SITU: ICD-10-CM

## 2020-03-20 DIAGNOSIS — I47.20 VT (VENTRICULAR TACHYCARDIA): ICD-10-CM

## 2020-03-20 DIAGNOSIS — Z79.01 LONG TERM (CURRENT) USE OF ANTICOAGULANTS: ICD-10-CM

## 2020-03-20 DIAGNOSIS — Z95.811 LVAD (LEFT VENTRICULAR ASSIST DEVICE) PRESENT: ICD-10-CM

## 2020-03-20 PROCEDURE — 93793 PR ANTICOAGULANT MGMT FOR PT TAKING WARFARIN: ICD-10-PCS | Mod: S$GLB,,,

## 2020-03-20 PROCEDURE — 93793 ANTICOAG MGMT PT WARFARIN: CPT | Mod: S$GLB,,,

## 2020-03-20 RX ORDER — ZOLPIDEM TARTRATE 10 MG/1
10 TABLET ORAL NIGHTLY PRN
Qty: 7 TABLET | Refills: 0 | Status: CANCELLED | OUTPATIENT
Start: 2020-03-20 | End: 2020-03-27

## 2020-03-20 NOTE — TELEPHONE ENCOUNTER
----- Message from Devendra Yanes MA sent at 3/20/2020 11:20 AM CDT -----  Contact: The Pt's disability Jooluuqzy-[phone]      ----- Message -----  From: Carissa Anguiano  Sent: 3/20/2020  11:16 AM CDT  To: Jama Hawkins is calling to see if the papers were faxed over. Please call him back @ [phone]. Thanks, Carissa

## 2020-03-20 NOTE — TELEPHONE ENCOUNTER
Sent message notifying pt that rx for ambien is printed at [facility] (Similar to 02/2020 rx by Dr. Power Connors, PCP), and that sildenafil was written by Dr. Bettye Connors in VAD/Heart Failure clinic. Since sildenafil has significant effects on pulm artery and pt has a VAD, advised pt to contact them to ensure it is still safe for pt to take

## 2020-03-20 NOTE — TELEPHONE ENCOUNTER
"Patient messaged clinic requesting labs because he felt :"weak and despondent." Patient requested labs, labs acquired and CR noted to be elevated, TSH low. Spoke with Dr. Connors who stated that results may be due to new antibiotics, Patient can be seen in clinic as well. Spoke with patient who stated he was confused as to why he was started on new medications on discharge and he is confused and worried it could be causing issues. Offered to have an appointment set up for patient to come in, patient declined, offered to set up a virtual visit for patient to talk with a physician, patient declined, stated he wanted to wait until he got over flu symptoms and reassess how he feels. Told patient if he feels as though symptoms are worsening, call back. Patient verbalized understanding.  "

## 2020-03-20 NOTE — PROGRESS NOTES
S/p ICD RV lead revision and Generator change.Removed dressing. Some of edges of scab removed on dressing. Incision well approx, no redness, or drainage. Pt denies tenderness or fever. Hematoma noted, pt states it is diminishing since inpatient.  Marked edges and picture assessed by . Orders received to have pt monitor for worsening or persistence of hematoma.  Cleansed with hibaclens. Covered with Aquacel foam dressing. Pt to leave drsg on for 2 days. Then cleanse daily with soap and water and report s/s of infection or worsening hematoma.

## 2020-03-20 NOTE — PROGRESS NOTES
INR not at goal. Medications, chart, and patient findings reviewed. See calendar for adjustments to dose and follow up plan.  Hold x 2 doses and repeat INR on Monday.

## 2020-03-23 ENCOUNTER — PATIENT MESSAGE (OUTPATIENT)
Dept: TRANSPLANT | Facility: CLINIC | Age: 55
End: 2020-03-23

## 2020-03-23 ENCOUNTER — LAB VISIT (OUTPATIENT)
Dept: LAB | Facility: HOSPITAL | Age: 55
End: 2020-03-23
Attending: INTERNAL MEDICINE
Payer: COMMERCIAL

## 2020-03-23 ENCOUNTER — CLINICAL SUPPORT (OUTPATIENT)
Dept: TRANSPLANT | Facility: CLINIC | Age: 55
End: 2020-03-23
Payer: COMMERCIAL

## 2020-03-23 ENCOUNTER — TELEPHONE (OUTPATIENT)
Dept: ENDOCRINOLOGY | Facility: CLINIC | Age: 55
End: 2020-03-23

## 2020-03-23 ENCOUNTER — ANTI-COAG VISIT (OUTPATIENT)
Dept: CARDIOLOGY | Facility: CLINIC | Age: 55
End: 2020-03-23
Payer: COMMERCIAL

## 2020-03-23 ENCOUNTER — OFFICE VISIT (OUTPATIENT)
Dept: TRANSPLANT | Facility: CLINIC | Age: 55
End: 2020-03-23
Payer: COMMERCIAL

## 2020-03-23 VITALS — HEIGHT: 73 IN | TEMPERATURE: 97 F | WEIGHT: 263 LBS | BODY MASS INDEX: 34.85 KG/M2

## 2020-03-23 DIAGNOSIS — I50.9 HEART FAILURE: ICD-10-CM

## 2020-03-23 DIAGNOSIS — Z95.811 HEART REPLACED BY HEART ASSIST DEVICE: ICD-10-CM

## 2020-03-23 DIAGNOSIS — E05.90 HYPERTHYROIDISM: Primary | ICD-10-CM

## 2020-03-23 DIAGNOSIS — F19.982 SUBSTANCE OR MEDICATION-INDUCED SLEEP DISORDER, INSOMNIA TYPE: ICD-10-CM

## 2020-03-23 DIAGNOSIS — Z79.01 LONG TERM (CURRENT) USE OF ANTICOAGULANTS: ICD-10-CM

## 2020-03-23 DIAGNOSIS — Z95.811 LVAD (LEFT VENTRICULAR ASSIST DEVICE) PRESENT: ICD-10-CM

## 2020-03-23 DIAGNOSIS — T46.2X5A AMIODARONE-INDUCED HYPERTHYROIDISM: ICD-10-CM

## 2020-03-23 DIAGNOSIS — E05.80 AMIODARONE-INDUCED HYPERTHYROIDISM: ICD-10-CM

## 2020-03-23 LAB
ALBUMIN SERPL BCP-MCNC: 3.6 G/DL (ref 3.5–5.2)
ALP SERPL-CCNC: 96 U/L (ref 55–135)
ALT SERPL W/O P-5'-P-CCNC: 31 U/L (ref 10–44)
ANION GAP SERPL CALC-SCNC: 16 MMOL/L (ref 8–16)
AST SERPL-CCNC: 24 U/L (ref 10–40)
BASOPHILS # BLD AUTO: 0.02 K/UL (ref 0–0.2)
BASOPHILS NFR BLD: 0.1 % (ref 0–1.9)
BILIRUB DIRECT SERPL-MCNC: 0.3 MG/DL (ref 0.1–0.3)
BILIRUB SERPL-MCNC: 0.6 MG/DL (ref 0.1–1)
BNP SERPL-MCNC: 180 PG/ML (ref 0–99)
BUN SERPL-MCNC: 25 MG/DL (ref 6–20)
CALCIUM SERPL-MCNC: 9.7 MG/DL (ref 8.7–10.5)
CHLORIDE SERPL-SCNC: 98 MMOL/L (ref 95–110)
CO2 SERPL-SCNC: 23 MMOL/L (ref 23–29)
CREAT SERPL-MCNC: 1.8 MG/DL (ref 0.5–1.4)
CRP SERPL-MCNC: 11.4 MG/L (ref 0–8.2)
DIFFERENTIAL METHOD: ABNORMAL
EOSINOPHIL # BLD AUTO: 0 K/UL (ref 0–0.5)
EOSINOPHIL NFR BLD: 0 % (ref 0–8)
ERYTHROCYTE [DISTWIDTH] IN BLOOD BY AUTOMATED COUNT: 16.8 % (ref 11.5–14.5)
EST. GFR  (AFRICAN AMERICAN): 49 ML/MIN/1.73 M^2
EST. GFR  (NON AFRICAN AMERICAN): 42 ML/MIN/1.73 M^2
GLUCOSE SERPL-MCNC: 119 MG/DL (ref 70–110)
HCT VFR BLD AUTO: 45 % (ref 40–54)
HGB BLD-MCNC: 13.7 G/DL (ref 14–18)
IMM GRANULOCYTES # BLD AUTO: 0.26 K/UL (ref 0–0.04)
IMM GRANULOCYTES NFR BLD AUTO: 1.5 % (ref 0–0.5)
INR PPP: 1.9 (ref 0.8–1.2)
LDH SERPL L TO P-CCNC: 540 U/L (ref 110–260)
LYMPHOCYTES # BLD AUTO: 0.5 K/UL (ref 1–4.8)
LYMPHOCYTES NFR BLD: 3 % (ref 18–48)
MAGNESIUM SERPL-MCNC: 2 MG/DL (ref 1.6–2.6)
MCH RBC QN AUTO: 25.6 PG (ref 27–31)
MCHC RBC AUTO-ENTMCNC: 30.4 G/DL (ref 32–36)
MCV RBC AUTO: 84 FL (ref 82–98)
MONOCYTES # BLD AUTO: 0.7 K/UL (ref 0.3–1)
MONOCYTES NFR BLD: 3.9 % (ref 4–15)
NEUTROPHILS # BLD AUTO: 15.9 K/UL (ref 1.8–7.7)
NEUTROPHILS NFR BLD: 91.5 % (ref 38–73)
NRBC BLD-RTO: 0 /100 WBC
PHOSPHATE SERPL-MCNC: 3.6 MG/DL (ref 2.7–4.5)
PLATELET # BLD AUTO: 238 K/UL (ref 150–350)
PMV BLD AUTO: 10.4 FL (ref 9.2–12.9)
POTASSIUM SERPL-SCNC: 4 MMOL/L (ref 3.5–5.1)
PREALB SERPL-MCNC: 37 MG/DL (ref 20–43)
PROT SERPL-MCNC: 7.6 G/DL (ref 6–8.4)
PROTHROMBIN TIME: 21.3 SEC (ref 9–12.5)
RBC # BLD AUTO: 5.36 M/UL (ref 4.6–6.2)
SODIUM SERPL-SCNC: 137 MMOL/L (ref 136–145)
WBC # BLD AUTO: 17.4 K/UL (ref 3.9–12.7)

## 2020-03-23 PROCEDURE — 99214 OFFICE O/P EST MOD 30 MIN: CPT | Mod: 95,,, | Performed by: INTERNAL MEDICINE

## 2020-03-23 PROCEDURE — 36415 COLL VENOUS BLD VENIPUNCTURE: CPT

## 2020-03-23 PROCEDURE — 84100 ASSAY OF PHOSPHORUS: CPT

## 2020-03-23 PROCEDURE — 99214 PR OFFICE/OUTPT VISIT, EST, LEVL IV, 30-39 MIN: ICD-10-PCS | Mod: 95,,, | Performed by: INTERNAL MEDICINE

## 2020-03-23 PROCEDURE — 85610 PROTHROMBIN TIME: CPT

## 2020-03-23 PROCEDURE — 84134 ASSAY OF PREALBUMIN: CPT

## 2020-03-23 PROCEDURE — 83735 ASSAY OF MAGNESIUM: CPT

## 2020-03-23 PROCEDURE — 80053 COMPREHEN METABOLIC PANEL: CPT

## 2020-03-23 PROCEDURE — 83615 LACTATE (LD) (LDH) ENZYME: CPT

## 2020-03-23 PROCEDURE — 83880 ASSAY OF NATRIURETIC PEPTIDE: CPT

## 2020-03-23 PROCEDURE — 82248 BILIRUBIN DIRECT: CPT

## 2020-03-23 PROCEDURE — 85025 COMPLETE CBC W/AUTO DIFF WBC: CPT

## 2020-03-23 PROCEDURE — 86140 C-REACTIVE PROTEIN: CPT

## 2020-03-23 RX ORDER — ZOLPIDEM TARTRATE 10 MG/1
10 TABLET ORAL NIGHTLY PRN
Qty: 30 TABLET | Refills: 5 | Status: CANCELLED | OUTPATIENT
Start: 2020-03-23 | End: 2020-04-22

## 2020-03-23 RX ORDER — ZOLPIDEM TARTRATE 10 MG/1
10 TABLET ORAL NIGHTLY PRN
Qty: 30 TABLET | Refills: 5 | Status: SHIPPED | OUTPATIENT
Start: 2020-03-23 | End: 2020-04-14 | Stop reason: SDUPTHER

## 2020-03-23 NOTE — PROGRESS NOTES
"  VIRTUAL VISIT    Date of Implant with Heartmate II LVAD: 12/18/2016    Vitals  Temperature, oral:   Temp Readings from Last 1 Encounters:   03/23/20 97.4 °F (36.3 °C) (Oral)     Blood Pressure:   BP Readings from Last 3 Encounters:   03/13/20 (!) 88/0   02/27/20 (!) 80/0   01/29/20 (!) 82/0        VAD Interrogation:  TXP SACHI INTERROGATIONS 3/13/2020 3/13/2020 3/12/2020   Type - - -   Flow - - -   Speed - - -   PI - - -   Power (Jackson) - - -   LSL - - -   Pulsatility Intermittent pulse No Pulse No Pulse       History Log: N/A  Problems / Issues / Alarms with VAD if any: None noted  VAD Sounds: HM2  HCT:   Lab Results   Component Value Date    HCT 45.0 03/23/2020    HCT 35 (L) 03/09/2020       Complaints/reason for visit today: routine  Emergency Equipment With Patient: yes   VAD Binder With Patient: no   Reviewed VAD Numbers In Binder: no    Any Equipment Issues: None noted (Refer to  note for complete details)    DLES Assessment:  Appearance Of Driveline: Will send picture to coordinators  Antibiotics: Doxy and Cefadroxil    Patient MyChart Questionnaire:   VAD QUESTIONNAIRE ANSWERS 3/23/2020   Have you had any LVAD related issues or alarms? No   Have you had any LVAD Equipment issues? No   How often do you do your LVAD dressing change? Every other day   What type of dressing change do you do?  Daily "scrubby" kits   Do you need dressing change supplies? Yes   If yes, what kind (i.e. boxes/kits)? Boxes 2   Do you need any new LVAD Accessories? (i.e Battery Holster Vest, Holster Vest, RT/LT Consolidation Bag) Yes   If yes, what kind of accessories do you need? Wall unit date passed   Have you been using your Monthly Equipment Checklist? Yes   Description of Stools: Normal and formed without blood   How Often: Every other day   Color Of Urine: Clear/Yellow   Are you experiencing: Anxiety   How many hours per night are you sleeping? 7   Do you take any medications to help you fall asleep? Yes "   If yes, what medications do you take to help you fall asleep?  Ambien   Are you Showering? Yes   Do you change your dressing immediately after you dry off?  Yes   Are you exercising? Yes   If so, what do you do and for how long per day? Walk   How often are you exercising? Daily   Are you working or what do you do to stay active? Working   Are you driving? Yes   Do you know that if you have an alarm while driving you need to pull over first before looking at your alarm to avoid an accident? Yes   Are you in pain? No   Do you take any medications for pain?  No   What can we do for you? Check labs disscuss control unit   Is your appointment today? Yes   Do you have your Emergency Bag with you? Yes   Do you have your VAD Binder with you in clinic today?  Yes      DLES Dressing Care:   Frequency of Dressing Changes: daily & daily kit  Pt In Need Of Management Kits?:yes -   2 Box of daily kit  It is medically necessary to have VAD management kits in order to prevent infection or to assist in the healing of an infected DLES.    Labs:    Chemistry        Component Value Date/Time     03/23/2020 1409    K 4.0 03/23/2020 1409    CL 98 03/23/2020 1409    CO2 23 03/23/2020 1409    BUN 25 (H) 03/23/2020 1409    CREATININE 1.8 (H) 03/23/2020 1409     (H) 03/23/2020 1409        Component Value Date/Time    CALCIUM 9.7 03/23/2020 1409    ALKPHOS 96 03/23/2020 1409    AST 24 03/23/2020 1409    ALT 31 03/23/2020 1409    BILITOT 0.6 03/23/2020 1409    ESTGFRAFRICA 49 (A) 03/23/2020 1409    EGFRNONAA 42 (A) 03/23/2020 1409            Magnesium   Date Value Ref Range Status   03/23/2020 2.0 1.6 - 2.6 mg/dL Final       Lab Results   Component Value Date    WBC 17.40 (H) 03/23/2020    HGB 13.7 (L) 03/23/2020    HCT 45.0 03/23/2020    MCV 84 03/23/2020     03/23/2020       Lab Results   Component Value Date    INR 1.9 (H) 03/23/2020    INR 4.5 (H) 03/20/2020    INR 2.0 (H) 03/13/2020       BNP   Date Value Ref Range  Status   03/23/2020 180 (H) 0 - 99 pg/mL Final     Comment:     Values of less than 100 pg/ml are consistent with non-CHF populations.   03/20/2020 212 (H) 0 - 99 pg/mL Final     Comment:     Values of less than 100 pg/ml are consistent with non-CHF populations.   03/13/2020 175 (H) 0 - 99 pg/mL Final     Comment:     Values of less than 100 pg/ml are consistent with non-CHF populations.       LD   Date Value Ref Range Status   03/23/2020 540 (H) 110 - 260 U/L Final     Comment:     Results are increased in hemolyzed samples.   03/13/2020 524 (H) 110 - 260 U/L Final     Comment:     Results are increased in hemolyzed samples.   03/12/2020 560 (H) 110 - 260 U/L Final     Comment:     Results are increased in hemolyzed samples.       Labs reviewed with patient: YES      Patient Satisfaction Survey completed per patient: No  (explained about signature and box to check)  Medication reconciliation: per MA.  Medication Detail updated today: yes  Coumadin Managed by: [facility]    Education: Reviewed driveline care, emergency procedures, how to change the controller, alarms with patient, as well as discussed how to page the VAD coordinator in case of an emergency.     Plans/Needs: Patient seen via virtual visit with Dr. Connors. Patient and MD talked in depth about weight loss and potential for transplant work up. Patient discussed 100% smoking quitting, has had extreme success. Patient has no needs and is to follow up in clinic in 2 months per Dr. Connors, see provider note.     Hurricane Season: No

## 2020-03-23 NOTE — LETTER
March 23, 2020        Nader Chiu  [address]  [address]  [place] [address]  Phone: [phone]  Fax: [phone]             [facility] [address] [place] [address]  Phone: [phone]   Patient: Tim Richards   MR Number: [id number]   YOB: 1965   Date of Visit: 3/23/2020       Dear Dr. Nader Chiu    Thank you for referring Tim Richards to me for evaluation. Attached you will find relevant portions of my assessment and plan of care.    If you have questions, please do not hesitate to call me. I look forward to following Tim Richards along with you.    Sincerely,    Bettye Connors MD    Enclosure    If you would like to receive this communication electronically, please contact [email or web address] or [phone] to request [employer] access.    [employer] is a tool which provides read-only access to select patient information with whom you have a relationship. Its easy to use and provides real time access to review your patients record including encounter summaries, notes, results, and demographic information.    If you feel you have received this communication in error or would no longer like to receive these types of communications, please e-mail [email or web address]

## 2020-03-23 NOTE — PROGRESS NOTES
INR not at goal. Medications, chart, and patient findings reviewed. See calendar for adjustments to dose and follow up plan.  Just below after holding x 2, will not boost as he was recently very high, recent pocket hematome and has Hx of GIB.  Recheck Thursday 3/26.

## 2020-03-23 NOTE — PROGRESS NOTES
Subjective:   Patient ID:  Tim Richards is a 54 y.o. male who presents for LVAD followup visit.    The patient location is: home   The chief complaint leading to consultation is: :LVAD  Visit type: Virtual visit with synchronous audio and video  Total time spent with patient: 30   Each patient to whom he or she provides medical services by telemedicine is:  (1) informed of the relationship between the physician and patient and the respective role of any other health care provider with respect to management of the patient; and (2) notified that he or she may decline to receive medical services by telemedicine and may withdraw from such care at any time.    Interrogation of Ventricular assist device was performed with physician analysis of device parameters and review of device function. I have personally reviewed the interrogation findings and agree with findings as stated.     Implant Date: 3/8/2018        Heartmate II RPM [id number]  3/9/18 outflow graft changed     INR goal: 2-3   NO Bridge with heparin  Antiplatelets:  All antiplatelets stopped after GIB 7/23/19    TXP SACHI INTERROGATIONS 3/23/2020   Type HeartMate II   Flow 6.2   Speed 9800   PI 4.5   Power (Jackson) 7.0   LSL -   Pulsatility -       HPI   DCM, HBP, CHF stage D s/p HM 2 comes for routine visit.   Discharged from our service in early March 2020  After he underwent ICD gen change (BiV to dual chamber ICD) and addition of RV/ICD lead, DGT's and pocket revision.      Seems to be in above average spirits since he quit smoking at the beginning of Jan 2020  Pt reports no edema.  No LVAD alarms on controller.  Feels that he has gained weight ever since he went on prednisone for hyperthyroidism,.    Answers for HPI/ROS submitted by the patient on 3/23/2020   Sweats?: No  chest tightness: No  Difficulty breathing when lying down?: Yes  syncope: No      Review of Systems   Constitution: Negative for decreased appetite, weight gain and weight loss.    Cardiovascular: Negative for chest pain, dyspnea on exertion, leg swelling, near-syncope, orthopnea and palpitations.   Respiratory: Negative for cough and shortness of breath.    Musculoskeletal: Negative for myalgias.   Gastrointestinal: Negative for jaundice.       Objective:         Physical Exam   Constitutional: He is oriented to person, place, and time. He appears well-developed and well-nourished. He is active. He is not intubated.        HENT:   Head: Normocephalic and atraumatic. Hair is normal.   Right Ear: External ear normal.   Left Ear: External ear normal.   Nose: Nose normal. No nasal deformity. No epistaxis.  No foreign bodies.   Mouth/Throat: Mucous membranes are normal. Mucous membranes are not cyanotic. No oropharyngeal exudate.   Eyes: Pupils are equal, round, and reactive to light. Conjunctivae and EOM are normal.   Neck: Neck supple. No hepatojugular reflux and no JVD present.   Cardiovascular: Normal rate, regular rhythm, normal heart sounds and normal pulses. Exam reveals no gallop.   Pulmonary/Chest: Effort normal and breath sounds normal. No apnea and no tachypnea. He is not intubated. No respiratory distress. He exhibits no tenderness.   Abdominal: Soft. Normal appearance and bowel sounds are normal. There is no tenderness. No hernia.   Musculoskeletal: Normal range of motion.   Neurological: He is alert and oriented to person, place, and time. He displays no seizure activity.   Skin: Skin is warm, dry and intact. No rash noted. No pallor.   Psychiatric: He has a normal mood and affect. His speech is normal and behavior is normal. Thought content normal. Cognition and memory are normal.       Lab Results   Component Value Date    WBC 17.40 (H) 03/23/2020    HGB 13.7 (L) 03/23/2020    HCT 45.0 03/23/2020    MCV 84 03/23/2020     03/23/2020    CO2 23 03/23/2020    CREATININE 1.8 (H) 03/23/2020    CALCIUM 9.7 03/23/2020    ALBUMIN 3.6 03/23/2020    AST 24 03/23/2020     (H)  03/23/2020    ALT 31 03/23/2020     (H) 03/23/2020       Lab Results   Component Value Date    INR 1.9 (H) 03/23/2020    INR 4.5 (H) 03/20/2020    INR 2.0 (H) 03/13/2020       BNP   Date Value Ref Range Status   03/23/2020 180 (H) 0 - 99 pg/mL Final     Comment:     Values of less than 100 pg/ml are consistent with non-CHF populations.   03/20/2020 212 (H) 0 - 99 pg/mL Final     Comment:     Values of less than 100 pg/ml are consistent with non-CHF populations.   03/13/2020 175 (H) 0 - 99 pg/mL Final     Comment:     Values of less than 100 pg/ml are consistent with non-CHF populations.       LD   Date Value Ref Range Status   03/23/2020 540 (H) 110 - 260 U/L Final     Comment:     Results are increased in hemolyzed samples.   03/13/2020 524 (H) 110 - 260 U/L Final     Comment:     Results are increased in hemolyzed samples.   03/12/2020 560 (H) 110 - 260 U/L Final     Comment:     Results are increased in hemolyzed samples.         Assessment:      1. Substance or medication-induced sleep disorder, insomnia type        Plan:   Discussed need to lose weight and remain smoke free for his own health and well being.  Patient is now NYHA II  Recommend 2 gram sodium restriction and 1500cc fluid restriction.  Encourage physical activity with graded exercise program.  Requested patient to weigh themselves daily, and to notify us if their weight increases by more than 3 lbs in 1 day or 5 lbs in 1 week.     Listed for transplant: No    Quit smoking at beginning of Jan 2020   Need BMI <  35 (consider bariatric surgery)     UNOS Patient Status  Functional Status: 70% - Cares for self: unable to carry on normal activity or active work  Physical Capacity: No Limitations  Working for Income: yes  If yes, working activity level: Working, Part Time vs. Full Time Unknown

## 2020-03-30 ENCOUNTER — PATIENT MESSAGE (OUTPATIENT)
Dept: TRANSPLANT | Facility: CLINIC | Age: 55
End: 2020-03-30

## 2020-03-30 RX ORDER — TORSEMIDE 20 MG/1
40 TABLET ORAL DAILY
Qty: 60 TABLET | Refills: 11 | Status: ON HOLD | OUTPATIENT
Start: 2020-03-30 | End: 2020-04-29 | Stop reason: HOSPADM

## 2020-04-03 RX ORDER — SILDENAFIL 100 MG/1
100 TABLET, FILM COATED ORAL DAILY PRN
Qty: 10 TABLET | Refills: 1 | Status: SHIPPED | OUTPATIENT
Start: 2020-04-03 | End: 2021-02-11 | Stop reason: SDUPTHER

## 2020-04-09 ENCOUNTER — LAB VISIT (OUTPATIENT)
Dept: LAB | Facility: HOSPITAL | Age: 55
End: 2020-04-09
Attending: INTERNAL MEDICINE
Payer: COMMERCIAL

## 2020-04-09 ENCOUNTER — ANTI-COAG VISIT (OUTPATIENT)
Dept: CARDIOLOGY | Facility: CLINIC | Age: 55
End: 2020-04-09
Payer: COMMERCIAL

## 2020-04-09 DIAGNOSIS — Z95.811 LVAD (LEFT VENTRICULAR ASSIST DEVICE) PRESENT: ICD-10-CM

## 2020-04-09 DIAGNOSIS — Z79.01 LONG TERM (CURRENT) USE OF ANTICOAGULANTS: ICD-10-CM

## 2020-04-09 LAB
INR PPP: 2.4 (ref 0.8–1.2)
PROTHROMBIN TIME: 26.4 SEC (ref 9–12.5)

## 2020-04-09 PROCEDURE — 93793 ANTICOAG MGMT PT WARFARIN: CPT | Mod: S$GLB,,,

## 2020-04-09 PROCEDURE — 85610 PROTHROMBIN TIME: CPT

## 2020-04-09 PROCEDURE — 36415 COLL VENOUS BLD VENIPUNCTURE: CPT

## 2020-04-09 PROCEDURE — 93793 PR ANTICOAGULANT MGMT FOR PT TAKING WARFARIN: ICD-10-PCS | Mod: S$GLB,,,

## 2020-04-13 ENCOUNTER — PATIENT MESSAGE (OUTPATIENT)
Dept: CARDIOLOGY | Facility: CLINIC | Age: 55
End: 2020-04-13

## 2020-04-13 ENCOUNTER — PATIENT MESSAGE (OUTPATIENT)
Dept: TRANSPLANT | Facility: CLINIC | Age: 55
End: 2020-04-13

## 2020-04-14 DIAGNOSIS — F19.982 SUBSTANCE OR MEDICATION-INDUCED SLEEP DISORDER, INSOMNIA TYPE: ICD-10-CM

## 2020-04-14 RX ORDER — ZOLPIDEM TARTRATE 10 MG/1
10 TABLET ORAL NIGHTLY PRN
Qty: 30 TABLET | Refills: 5 | Status: SHIPPED | OUTPATIENT
Start: 2020-04-14 | End: 2020-07-23 | Stop reason: SDUPTHER

## 2020-04-14 RX ORDER — ZOLPIDEM TARTRATE 10 MG/1
10 TABLET ORAL NIGHTLY PRN
Qty: 30 TABLET | Refills: 5 | Status: SHIPPED | OUTPATIENT
Start: 2020-04-14 | End: 2020-04-14 | Stop reason: SDUPTHER

## 2020-04-14 NOTE — PROGRESS NOTES
Did virtual visit so pt unable to get 10 mg of ambien  Will print it and see if we can fax or call it in

## 2020-04-16 ENCOUNTER — PATIENT MESSAGE (OUTPATIENT)
Dept: ENDOCRINOLOGY | Facility: CLINIC | Age: 55
End: 2020-04-16

## 2020-04-16 ENCOUNTER — LAB VISIT (OUTPATIENT)
Dept: LAB | Facility: HOSPITAL | Age: 55
End: 2020-04-16
Attending: INTERNAL MEDICINE
Payer: COMMERCIAL

## 2020-04-16 ENCOUNTER — OFFICE VISIT (OUTPATIENT)
Dept: ENDOCRINOLOGY | Facility: CLINIC | Age: 55
End: 2020-04-16
Payer: COMMERCIAL

## 2020-04-16 ENCOUNTER — ANTI-COAG VISIT (OUTPATIENT)
Dept: CARDIOLOGY | Facility: CLINIC | Age: 55
End: 2020-04-16
Payer: COMMERCIAL

## 2020-04-16 DIAGNOSIS — E05.90 HYPERTHYROIDISM: Primary | ICD-10-CM

## 2020-04-16 DIAGNOSIS — Z95.811 LVAD (LEFT VENTRICULAR ASSIST DEVICE) PRESENT: ICD-10-CM

## 2020-04-16 DIAGNOSIS — Z79.01 LONG TERM (CURRENT) USE OF ANTICOAGULANTS: ICD-10-CM

## 2020-04-16 DIAGNOSIS — E05.80 AMIODARONE-INDUCED HYPERTHYROIDISM: ICD-10-CM

## 2020-04-16 DIAGNOSIS — E66.01 SEVERE OBESITY (BMI 35.0-39.9) WITH COMORBIDITY: ICD-10-CM

## 2020-04-16 DIAGNOSIS — T46.2X5A AMIODARONE-INDUCED HYPERTHYROIDISM: ICD-10-CM

## 2020-04-16 LAB
INR PPP: 3.4 (ref 0.8–1.2)
PROTHROMBIN TIME: 38.2 SEC (ref 9–12.5)

## 2020-04-16 PROCEDURE — 93793 ANTICOAG MGMT PT WARFARIN: CPT | Mod: S$GLB,,,

## 2020-04-16 PROCEDURE — 93793 PR ANTICOAGULANT MGMT FOR PT TAKING WARFARIN: ICD-10-PCS | Mod: S$GLB,,,

## 2020-04-16 PROCEDURE — 99213 OFFICE O/P EST LOW 20 MIN: CPT | Mod: 95,,, | Performed by: INTERNAL MEDICINE

## 2020-04-16 PROCEDURE — 85610 PROTHROMBIN TIME: CPT

## 2020-04-16 PROCEDURE — 36415 COLL VENOUS BLD VENIPUNCTURE: CPT

## 2020-04-16 PROCEDURE — 99213 PR OFFICE/OUTPT VISIT, EST, LEVL III, 20-29 MIN: ICD-10-PCS | Mod: 95,,, | Performed by: INTERNAL MEDICINE

## 2020-04-16 RX ORDER — METHIMAZOLE 10 MG/1
TABLET ORAL
Qty: 120 TABLET | Refills: 2 | Status: ON HOLD | OUTPATIENT
Start: 2020-04-16 | End: 2020-04-29 | Stop reason: SDUPTHER

## 2020-04-16 RX ORDER — SPIRONOLACTONE 25 MG/1
25 TABLET ORAL DAILY
Qty: 30 TABLET | Refills: 11 | Status: ON HOLD | OUTPATIENT
Start: 2020-04-16 | End: 2020-07-13 | Stop reason: HOSPADM

## 2020-04-16 RX ORDER — PREDNISONE 20 MG/1
40 TABLET ORAL DAILY
Qty: 90 TABLET | Refills: 2 | Status: ON HOLD
Start: 2020-04-16 | End: 2020-04-29 | Stop reason: HOSPADM

## 2020-04-16 NOTE — PROGRESS NOTES
Subjective:      Chief Complaint: Referral for hyperthyroidism    The patient location is: home  The chief complaint leading to consultation is: amiodorone-induced hyperthyroidism  Visit type: audiovisual  Each patient to whom he or she provides medical services by telemedicine is:  (1) informed of the relationship between the physician and patient and the respective role of any other health care provider with respect to management of the patient; and (2) notified that he or she may decline to receive medical services by telemedicine and may withdraw from such care at any time.      HPI: Tim Richards is a 54 y.o. male who is here for follow-up evaluation for hyperthyroidism presumed 2/2 amiodarone.    Past Medical History:   Diagnosis Date    CHF (congestive heart failure)     Dilated cardiomyopathy 1/10/2018    Disorder of kidney and ureter     CKD    Gout     HTN (hypertension)     Ventricular tachycardia (paroxysmal)       After last visit prednisone dose reduced as low as 20 mg daily however he required dose to be raised again and is currently taking prednisone 60 mg daily along with methimazole 20 mg b.i.d...  In the past has been issues with compliance and skip doses but he reports no missed doses recently   he is primarily concerned with weight gain and increased appetite which he attributes to prednisone    Currently taking:  Prednisone 60 mg daily  Methimazole 20 mg BID    Component      Latest Ref Rng & Units 4/16/2020   TSH      0.400 - 4.000 uIU/mL 5.436 (H)   Free T4      0.71 - 1.51 ng/dL 0.94         Reviewed past medical, family, social history and updated as appropriate.    Review of Systems   Constitutional: Positive for unexpected weight change. Negative for activity change.   Respiratory: Negative for shortness of breath.    Cardiovascular: Negative for chest pain and leg swelling.   Gastrointestinal: Negative for abdominal pain.   Genitourinary: Negative for dysuria.   Skin: Negative  for rash.   Neurological: Negative for headaches.   Psychiatric/Behavioral: Negative for confusion.     Objective:       Wt Readings from Last 10 Encounters:   03/23/20 1506 119.3 kg (263 lb)   03/13/20 0544 118 kg (260 lb 2.3 oz)   03/12/20 0500 119.2 kg (262 lb 12.6 oz)   03/11/20 1518 121.9 kg (268 lb 11.9 oz)   03/10/20 0732 121.9 kg (268 lb 11.9 oz)   03/09/20 1512 123.4 kg (272 lb 0.8 oz)   03/09/20 0744 120.2 kg (265 lb)   02/27/20 1504 119.3 kg (263 lb)   02/27/20 1514 121.1 kg (267 lb)   02/21/20 0933 121.6 kg (268 lb 3 oz)   02/07/20 1323 116.6 kg (257 lb)   02/05/20 1623 114.8 kg (253 lb)   01/29/20 1130 115.7 kg (255 lb)   01/20/20 0700 116.4 kg (256 lb 9.9 oz)   01/18/20 0710 116.7 kg (257 lb 4.4 oz)   01/17/20 0500 116.4 kg (256 lb 9.9 oz)   01/16/20 1100 115.8 kg (255 lb 2.9 oz)   01/14/20 0400 119.3 kg (263 lb 0.1 oz)   01/13/20 0500 116.1 kg (256 lb 0.7 oz)   01/12/20 0400 121.5 kg (267 lb 13.7 oz)   01/11/20 2246 124.7 kg (274 lb 14.6 oz)   12/31/19 1038 124.7 kg (274 lb 14.6 oz)       Lab Results   Component Value Date    HGBA1C 5.5 03/08/2018    HGBA1C 5.4 01/23/2018    HGBA1C 5.6 08/04/2016    HGBA1C 5.8 09/11/2015     Lab Results   Component Value Date    CHOL 287 (H) 02/07/2020    CHOL 150 11/07/2018    HDL >140 (H) 02/07/2020    HDL 85 (H) 11/07/2018    LDLCALC Unable to calculate 02/07/2020    LDLCALC 53.0 (L) 11/07/2018    TRIG 56 02/07/2020    TRIG 60 11/07/2018    CHOLHDL Unable to calculate 02/07/2020    CHOLHDL 56.7 (H) 11/07/2018     Lab Results   Component Value Date     03/23/2020    K 4.0 03/23/2020    CL 98 03/23/2020    CO2 23 03/23/2020     (H) 03/23/2020    BUN 25 (H) 03/23/2020    CREATININE 1.8 (H) 03/23/2020    CALCIUM 9.7 03/23/2020    PROT 7.6 03/23/2020    ALBUMIN 3.6 03/23/2020    BILITOT 0.6 03/23/2020    ALKPHOS 96 03/23/2020    AST 24 03/23/2020    ALT 31 03/23/2020    ANIONGAP 16 03/23/2020    ESTGFRAFRICA 49 (A) 03/23/2020    EGFRNONAA 42 (A) 03/23/2020     TSH 5.436 (H) 04/16/2020      No results found for: MICALBCREAT    Assessment/Plan:     Amiodarone-induced hyperthyroidism  Recent labs with mildly elevated TSH  Will make the following changes:  Patient Instructions   Decrease prednisone to 40 mg daily   Decrease methimazole to 20 mg in the morning, 10 mg in the evening    Labs in 3 weeks        Severe obesity (BMI 35.0-39.9) with comorbidity  He is concerned about weight gain which is likely largely due to prednisone dose  Will reduce prednisone as above however he understands that this needs to be done gradually particularly as hyperthyroidism has worsened in the past with decrease in dose  Discussed particular attention to diet during this time      RTC 3 months

## 2020-04-16 NOTE — PATIENT INSTRUCTIONS
Decrease prednisone to 40 mg daily   Decrease methimazole to 20 mg in the morning, 10 mg in the evening    Labs in 3 weeks

## 2020-04-17 NOTE — ASSESSMENT & PLAN NOTE
He is concerned about weight gain which is likely largely due to prednisone dose  Will reduce prednisone as above however he understands that this needs to be done gradually particularly as hyperthyroidism has worsened in the past with decrease in dose  Discussed particular attention to diet during this time

## 2020-04-17 NOTE — ASSESSMENT & PLAN NOTE
Recent labs with mildly elevated TSH  Will make the following changes:  Patient Instructions   Decrease prednisone to 40 mg daily   Decrease methimazole to 20 mg in the morning, 10 mg in the evening    Labs in 3 weeks

## 2020-04-22 RX ORDER — FERROUS GLUCONATE 324(38)MG
324 TABLET ORAL
Qty: 90 TABLET | Refills: 6 | Status: ON HOLD | OUTPATIENT
Start: 2020-04-22 | End: 2020-04-29 | Stop reason: HOSPADM

## 2020-04-23 ENCOUNTER — PATIENT MESSAGE (OUTPATIENT)
Dept: TRANSPLANT | Facility: CLINIC | Age: 55
End: 2020-04-23

## 2020-04-23 ENCOUNTER — LAB VISIT (OUTPATIENT)
Dept: LAB | Facility: HOSPITAL | Age: 55
End: 2020-04-23
Attending: INTERNAL MEDICINE
Payer: COMMERCIAL

## 2020-04-23 ENCOUNTER — ANTI-COAG VISIT (OUTPATIENT)
Dept: CARDIOLOGY | Facility: CLINIC | Age: 55
End: 2020-04-23
Payer: COMMERCIAL

## 2020-04-23 ENCOUNTER — TELEPHONE (OUTPATIENT)
Dept: TRANSPLANT | Facility: CLINIC | Age: 55
End: 2020-04-23

## 2020-04-23 DIAGNOSIS — Z95.811 LVAD (LEFT VENTRICULAR ASSIST DEVICE) PRESENT: ICD-10-CM

## 2020-04-23 DIAGNOSIS — Z95.811 LVAD (LEFT VENTRICULAR ASSIST DEVICE) PRESENT: Primary | ICD-10-CM

## 2020-04-23 DIAGNOSIS — R74.02 ELEVATED SERUM LACTATE DEHYDROGENASE (LDH): ICD-10-CM

## 2020-04-23 DIAGNOSIS — Z95.811 HEART REPLACED BY HEART ASSIST DEVICE: ICD-10-CM

## 2020-04-23 DIAGNOSIS — I50.9 HEART FAILURE: ICD-10-CM

## 2020-04-23 DIAGNOSIS — Z79.01 LONG TERM (CURRENT) USE OF ANTICOAGULANTS: ICD-10-CM

## 2020-04-23 LAB
ALBUMIN SERPL BCP-MCNC: 3.6 G/DL (ref 3.5–5.2)
ALP SERPL-CCNC: 117 U/L (ref 55–135)
ALT SERPL W/O P-5'-P-CCNC: 45 U/L (ref 10–44)
ANION GAP SERPL CALC-SCNC: 13 MMOL/L (ref 8–16)
AST SERPL-CCNC: 27 U/L (ref 10–40)
BASOPHILS # BLD AUTO: 0.01 K/UL (ref 0–0.2)
BASOPHILS NFR BLD: 0.1 % (ref 0–1.9)
BILIRUB DIRECT SERPL-MCNC: 0.3 MG/DL (ref 0.1–0.3)
BILIRUB SERPL-MCNC: 0.5 MG/DL (ref 0.1–1)
BNP SERPL-MCNC: 166 PG/ML (ref 0–99)
BUN SERPL-MCNC: 22 MG/DL (ref 6–20)
CALCIUM SERPL-MCNC: 9.6 MG/DL (ref 8.7–10.5)
CHLORIDE SERPL-SCNC: 98 MMOL/L (ref 95–110)
CHOLEST SERPL-MCNC: 259 MG/DL (ref 120–199)
CHOLEST/HDLC SERPL: 2.1 {RATIO} (ref 2–5)
CO2 SERPL-SCNC: 25 MMOL/L (ref 23–29)
CREAT SERPL-MCNC: 1.9 MG/DL (ref 0.5–1.4)
CRP SERPL-MCNC: 30.4 MG/L (ref 0–8.2)
DIFFERENTIAL METHOD: ABNORMAL
EOSINOPHIL # BLD AUTO: 0 K/UL (ref 0–0.5)
EOSINOPHIL NFR BLD: 0 % (ref 0–8)
ERYTHROCYTE [DISTWIDTH] IN BLOOD BY AUTOMATED COUNT: 16.8 % (ref 11.5–14.5)
EST. GFR  (AFRICAN AMERICAN): 46 ML/MIN/1.73 M^2
EST. GFR  (NON AFRICAN AMERICAN): 39 ML/MIN/1.73 M^2
GLUCOSE SERPL-MCNC: 272 MG/DL (ref 70–110)
HCT VFR BLD AUTO: 45.9 % (ref 40–54)
HDLC SERPL-MCNC: 121 MG/DL (ref 40–75)
HDLC SERPL: 46.7 % (ref 20–50)
HGB BLD-MCNC: 13.9 G/DL (ref 14–18)
IMM GRANULOCYTES # BLD AUTO: 0.17 K/UL (ref 0–0.04)
IMM GRANULOCYTES NFR BLD AUTO: 1.7 % (ref 0–0.5)
INR PPP: 2.3 (ref 0.8–1.2)
LDH SERPL L TO P-CCNC: 735 U/L (ref 110–260)
LDLC SERPL CALC-MCNC: 112.8 MG/DL (ref 63–159)
LYMPHOCYTES # BLD AUTO: 0.3 K/UL (ref 1–4.8)
LYMPHOCYTES NFR BLD: 3.3 % (ref 18–48)
MAGNESIUM SERPL-MCNC: 2.2 MG/DL (ref 1.6–2.6)
MCH RBC QN AUTO: 26.6 PG (ref 27–31)
MCHC RBC AUTO-ENTMCNC: 30.3 G/DL (ref 32–36)
MCV RBC AUTO: 88 FL (ref 82–98)
MONOCYTES # BLD AUTO: 0.3 K/UL (ref 0.3–1)
MONOCYTES NFR BLD: 3.3 % (ref 4–15)
NEUTROPHILS # BLD AUTO: 9 K/UL (ref 1.8–7.7)
NEUTROPHILS NFR BLD: 91.6 % (ref 38–73)
NONHDLC SERPL-MCNC: 138 MG/DL
NRBC BLD-RTO: 0 /100 WBC
PHOSPHATE SERPL-MCNC: 2.9 MG/DL (ref 2.7–4.5)
PLATELET # BLD AUTO: 148 K/UL (ref 150–350)
PMV BLD AUTO: 10.6 FL (ref 9.2–12.9)
POTASSIUM SERPL-SCNC: 4.3 MMOL/L (ref 3.5–5.1)
PREALB SERPL-MCNC: 39 MG/DL (ref 20–43)
PROT SERPL-MCNC: 7.2 G/DL (ref 6–8.4)
PROTHROMBIN TIME: 25.7 SEC (ref 9–12.5)
RBC # BLD AUTO: 5.22 M/UL (ref 4.6–6.2)
SODIUM SERPL-SCNC: 136 MMOL/L (ref 136–145)
T4 FREE SERPL-MCNC: 0.91 NG/DL (ref 0.71–1.51)
T4 SERPL-MCNC: 5.3 UG/DL (ref 4.5–11.5)
TRIGL SERPL-MCNC: 126 MG/DL (ref 30–150)
TSH SERPL DL<=0.005 MIU/L-ACNC: 5.75 UIU/ML (ref 0.4–4)
WBC # BLD AUTO: 9.79 K/UL (ref 3.9–12.7)

## 2020-04-23 PROCEDURE — 93793 PR ANTICOAGULANT MGMT FOR PT TAKING WARFARIN: ICD-10-PCS | Mod: S$GLB,,,

## 2020-04-23 PROCEDURE — 83880 ASSAY OF NATRIURETIC PEPTIDE: CPT

## 2020-04-23 PROCEDURE — 84436 ASSAY OF TOTAL THYROXINE: CPT

## 2020-04-23 PROCEDURE — 93793 ANTICOAG MGMT PT WARFARIN: CPT | Mod: S$GLB,,,

## 2020-04-23 PROCEDURE — 80053 COMPREHEN METABOLIC PANEL: CPT

## 2020-04-23 PROCEDURE — 82248 BILIRUBIN DIRECT: CPT

## 2020-04-23 PROCEDURE — 83615 LACTATE (LD) (LDH) ENZYME: CPT

## 2020-04-23 PROCEDURE — 84439 ASSAY OF FREE THYROXINE: CPT

## 2020-04-23 PROCEDURE — 83735 ASSAY OF MAGNESIUM: CPT

## 2020-04-23 PROCEDURE — 80061 LIPID PANEL: CPT

## 2020-04-23 PROCEDURE — 84134 ASSAY OF PREALBUMIN: CPT

## 2020-04-23 PROCEDURE — 84443 ASSAY THYROID STIM HORMONE: CPT

## 2020-04-23 PROCEDURE — 36415 COLL VENOUS BLD VENIPUNCTURE: CPT

## 2020-04-23 PROCEDURE — 85025 COMPLETE CBC W/AUTO DIFF WBC: CPT

## 2020-04-23 PROCEDURE — 84100 ASSAY OF PHOSPHORUS: CPT

## 2020-04-23 PROCEDURE — 85610 PROTHROMBIN TIME: CPT

## 2020-04-23 PROCEDURE — 86140 C-REACTIVE PROTEIN: CPT

## 2020-04-23 RX ORDER — ASPIRIN 81 MG/1
81 TABLET ORAL DAILY
Qty: 100 TABLET | Refills: 3 | Status: ON HOLD
Start: 2020-04-23 | End: 2020-09-14

## 2020-04-23 NOTE — PROGRESS NOTES
I am concerned with LDH even with new baseline in 500's.  This could be lab error, could be related to uncontrolled BP (has had this problem before), doubt volume related with very large LV and inability to reduce torsemide in the past.  I reviewed GIB episode attributed to possible ischemic colitis.  He has not had other episodes of bleeding.  Will resume ASA 81 mg qd today, repeat LDH with BP check/visit here at main campus tomorrow.

## 2020-04-23 NOTE — TELEPHONE ENCOUNTER
Patient LDH that was drawn this morning at an [facility] resulted at 735, jumping up from the last check. Spoke with patient regarding how he has been feeling. Patient reported that he feels SOB, tired, and asked patient to give average power, unable to provide average, but power is running in the 7s currently. Information communicated to Dr. Hadley.

## 2020-04-23 NOTE — TELEPHONE ENCOUNTER
Information communicated to patient. Patient to come to clinic tomorrow, have :LDH recheck, BP check and see Dr. Hadley at 11am.

## 2020-04-24 ENCOUNTER — TELEPHONE (OUTPATIENT)
Dept: ELECTROPHYSIOLOGY | Facility: CLINIC | Age: 55
End: 2020-04-24

## 2020-04-24 ENCOUNTER — TREATMENT PLANNING (OUTPATIENT)
Dept: TRANSPLANT | Facility: CLINIC | Age: 55
End: 2020-04-24

## 2020-04-24 ENCOUNTER — CLINICAL SUPPORT (OUTPATIENT)
Dept: TRANSPLANT | Facility: CLINIC | Age: 55
DRG: 641 | End: 2020-04-24
Payer: COMMERCIAL

## 2020-04-24 ENCOUNTER — DOCUMENTATION ONLY (OUTPATIENT)
Dept: ELECTROPHYSIOLOGY | Facility: CLINIC | Age: 55
End: 2020-04-24

## 2020-04-24 VITALS — SYSTOLIC BLOOD PRESSURE: 100 MMHG | BODY MASS INDEX: 34.32 KG/M2 | WEIGHT: 260.13 LBS | TEMPERATURE: 99 F

## 2020-04-24 DIAGNOSIS — N18.30 CKD (CHRONIC KIDNEY DISEASE), STAGE III: ICD-10-CM

## 2020-04-24 DIAGNOSIS — Z45.02 DEFIBRILLATOR DISCHARGE: Primary | ICD-10-CM

## 2020-04-24 DIAGNOSIS — Z95.810 BIVENTRICULAR IMPLANTABLE CARDIOVERTER-DEFIBRILLATOR IN SITU: ICD-10-CM

## 2020-04-24 DIAGNOSIS — I10 UNCONTROLLED HYPERTENSION: ICD-10-CM

## 2020-04-24 DIAGNOSIS — R74.02 ELEVATED SERUM LACTATE DEHYDROGENASE (LDH): ICD-10-CM

## 2020-04-24 DIAGNOSIS — I13.0 HYPERTENSIVE CARDIOVASCULAR-RENAL DISEASE, STAGE 1-4 OR UNSPECIFIED CHRONIC KIDNEY DISEASE, WITH HEART FAILURE: ICD-10-CM

## 2020-04-24 DIAGNOSIS — I42.8 NICM (NONISCHEMIC CARDIOMYOPATHY): Chronic | ICD-10-CM

## 2020-04-24 DIAGNOSIS — Z95.811 LVAD (LEFT VENTRICULAR ASSIST DEVICE) PRESENT: ICD-10-CM

## 2020-04-24 PROCEDURE — 99999 PR PBB SHADOW E&M-EST. PATIENT-LVL II: ICD-10-PCS | Mod: PBBFAC,,,

## 2020-04-24 PROCEDURE — 99999 PR PBB SHADOW E&M-EST. PATIENT-LVL II: CPT | Mod: PBBFAC,,,

## 2020-04-24 NOTE — PROGRESS NOTES
This 53 yo BM had lab check yesterday with  which was above his most recent new baseline of 's.  As per my note yesterday and asked him to resume ASA 81 mg qd and to come for repeat LDH plus BP check this morning.  He reports at last hosp visit his doxazosin dose was reduced to 8 mg BID to 8 mg qd.  He points out an error in charting at last clinic visit that he was never on amlodipine 10 mg BID and that at that visit he was taking doxazosin BID not daily.  During last hosp stay sent home on isosorbide 20 mg TID which he picked up from pharmacy but did not start as he reports no one explained the change in therapy.  While he tolerated amlodipine 10 mg q AM and doxazosin 8 mg BID prior to that admit when he got home he noted dizziness taking doxazosin and amlodipine together so he changed amlodipine to hs and this resolved.  Wife has doppler at home to take BP but has not done so in over a month.  She reports on doxazosin 8 mg BID and amlodipine 10 mg qd his BP was controlled with doppler 80-85 mm Hg.    He reports some COLEMAN and wt gain of 10# but attributes to high dose prednisone started when profoundly hyperthyroid in hospital.  This was recently dropped to 40 mg by Endo.  He has not been having edema or other issues.  No discoloration of urine.    BP today doppler 100 mm Hg  JVP--obese neck and I cannot determine CVP on exam  Lungs clear  Normal VAD sounds  Obese abdomen and liver span is normal by percussion  No edema  Pacemaker pocket with mild seroma and healing well now with small scab where he was experiencing leakage of blood earlier post op    Lab Results   Component Value Date     (H) 04/23/2020     (H) 03/23/2020     (H) 03/20/2020       04/23/2020    K 4.3 04/23/2020    MG 2.2 04/23/2020    CL 98 04/23/2020    CO2 25 04/23/2020    BUN 22 (H) 04/23/2020    CREATININE 1.9 (H) 04/23/2020     (H) 04/23/2020    AST 27 04/23/2020    ALT 45 (H) 04/23/2020     ALBUMIN 3.6 04/23/2020    PROT 7.2 04/23/2020    BILITOT 0.5 04/23/2020    WBC 9.79 04/23/2020    HGB 13.9 (L) 04/23/2020    HCT 45.9 04/23/2020     (L) 04/23/2020    INR 2.3 (H) 04/23/2020     (H) 03/23/2020     (H)--Yesterday 04/23/2020     (H)--TODAY 04/24/2020    TSH 5.753 (H) 04/23/2020    CHOL 259 (H) 04/23/2020     (H) 04/23/2020    LDLCALC 112.8 04/23/2020    TRIG 126 04/23/2020    [id number] 5.3 04/23/2020    FREET4 0.91 04/23/2020       Problem List Items Addressed This Visit    Uncontrolled hypertension   Current Assessment & Plan    I STRONGLY RECOMMENDED TO PATIENT AND WIFE THAT HE BE ADMITTED FOR CONTROL OF BP AND TO OBSERVE LDH; HE DECLINES ADMISSION DUE TO COVID CONCERNS AND EXPENSE AGAINST WIFE'S OBJECTION--SHE WAS VERY SUPPORTIVE OF ADMITTING HIM  He will resume doxazosin 8 mg BID starting with supper (approx 12 hr from AM dose); continue amlodipine 10 mg q hs  He will not take isosorbide--has not been doing this--and I will remove from list as he uses PDE-5 prn  He will take hydralazine 25 mg q 6 hr prn doppler BP > 85 mm Hg and take dose when he arrives home  He will come Monday for BP check and repeat LDH        Elevated serum lactate dehydrogenase (LDH)   Current Assessment & Plan    I am concerned with LDH even with new baseline in 500's.  This could be related to uncontrolled BP.  He does not appear dry.  Yesterday, I reviewed chart re: GIB episode attributed to possible ischemic colitis.  He has not had other episodes of bleeding.  I resumed ASA 81 mg qd yesterday and will continue as benefit >> risk at this time.  Repeat LDH with BP check at main campus Monday.

## 2020-04-24 NOTE — TELEPHONE ENCOUNTER
----- Message from Jessica Sanchez MA sent at 4/24/2020  2:51 PM CDT -----  Regarding: FW: Incision Site      ----- Message -----  From: Ivette Guerrier RN  Sent: 4/24/2020  12:52 PM CDT  To: [facility] Arrhythmia Device Staff, #  Subject: Incision Site                                    Good Afternoon, I am sorry if this has gone to the wrong people, I am still learning my way around this. This patient came in today after having some unrelated issues and as he was leaving he showed me his incision for his device placement on his LCW. It appears slightly swollen, it looks like the incision may have opened and then healed back up and now has a scab. The patient stated that up until about last week he had been bleeding. I was able to take a picture and upload it to the patient's chart, was just hoping someone might be able to take a look. Thank you!

## 2020-04-24 NOTE — PROGRESS NOTES
Message received from LVAD staff, Ivette Guerrier RN on patients incision site. Incision site s/p generator change and RV/ICD lead revision done by Dr. Yun on 3/9/2020. Picture in media tab reviewed with Dr. Bell (consult doctor). Verbal orders to have patient send another picture in 2 weeks to re-assess site. Called and left voicemail on patient's phone numbers listed in chart.

## 2020-04-24 NOTE — PROGRESS NOTES
Date of Implant with Heartmate II LVAD: 3/8/2018    PATIENT ARRIVED IN CLINIC:  Ambulatory   Accompanied by: Wife    Vitals  Temperature, oral:   Temp Readings from Last 1 Encounters:   04/24/20 99.2 °F (37.3 °C)     Blood Pressure:   BP Readings from Last 3 Encounters:   04/24/20 (!) 100/0   03/13/20 (!) 88/0   02/27/20 (!) 80/0        VAD Interrogation:  TXP SACHI INTERROGATIONS 4/24/2020 3/23/2020 3/13/2020   Type HeartMate II HeartMate II -   Flow 5.7 6.2 -   Speed 9800 9800 -   PI 4.3 4.5 -   Power (Jackson) 6.7 7.0 -   LSL 9400 - -   Pulsatility - - Intermittent pulse       Problems / Issues / Alarms with VAD if any: None noted  VAD Sounds: HM3 Smooth and distant      HCT:   Lab Results   Component Value Date    HCT 45.9 04/23/2020    HCT 35 (L) 03/09/2020       Complaints/reason for visit today: symptoms Patient had elevated LDH  Emergency Equipment With Patient: yes   VAD Binder With Patient: no   Reviewed VAD Numbers In Binder: no    Any Equipment Issues: None noted (Refer to  note for complete details)    Labs:    Chemistry        Component Value Date/Time     04/23/2020 1205    K 4.3 04/23/2020 1205    CL 98 04/23/2020 1205    CO2 25 04/23/2020 1205    BUN 22 (H) 04/23/2020 1205    CREATININE 1.9 (H) 04/23/2020 1205     (H) 04/23/2020 1205        Component Value Date/Time    CALCIUM 9.6 04/23/2020 1205    ALKPHOS 117 04/23/2020 1205    AST 27 04/23/2020 1205    ALT 45 (H) 04/23/2020 1205    BILITOT 0.5 04/23/2020 1205    ESTGFRAFRICA 46 (A) 04/23/2020 1205    EGFRNONAA 39 (A) 04/23/2020 1205            Magnesium   Date Value Ref Range Status   04/23/2020 2.2 1.6 - 2.6 mg/dL Final       Lab Results   Component Value Date    WBC 9.79 04/23/2020    HGB 13.9 (L) 04/23/2020    HCT 45.9 04/23/2020    MCV 88 04/23/2020     (L) 04/23/2020       Lab Results   Component Value Date    INR 2.3 (H) 04/23/2020    INR 3.4 (H) 04/16/2020    INR 2.4 (H) 04/09/2020       BNP   Date  Value Ref Range Status   04/23/2020 166 (H) 0 - 99 pg/mL Final     Comment:     Values of less than 100 pg/ml are consistent with non-CHF populations.   03/23/2020 180 (H) 0 - 99 pg/mL Final     Comment:     Values of less than 100 pg/ml are consistent with non-CHF populations.   03/20/2020 212 (H) 0 - 99 pg/mL Final     Comment:     Values of less than 100 pg/ml are consistent with non-CHF populations.       LD   Date Value Ref Range Status   04/24/2020 732 (H) 110 - 260 U/L Final     Comment:     Results are increased in hemolyzed samples.   04/23/2020 735 (H) 110 - 260 U/L Final     Comment:     Results are increased in hemolyzed samples.   03/23/2020 540 (H) 110 - 260 U/L Final     Comment:     Results are increased in hemolyzed samples.       Labs reviewed with patient: YES     Medication reconciliation: Patient states he is not taking Isordil  Coumadin Managed by: [facility]    Education: Reviewed driveline care, emergency procedures, how to change the controller, alarms with patient, as well as discussed how to page the VAD coordinator in case of an emergency.     Plans/Needs: Patient seen in clinic for a BP check and LDH recheck. LDH remains elevated. Doppler is 100/0 with no correlating BP. Patient is not pulsatile. Patient appears to have gained weight, about 20lbs in 2 months per the patient. Patient appears SOB when just speaking and reports being Sob when ambulating. Patient also asked RN to look at ICD site, stating up until last week it had still been bleeding. Site appears to have healed for the most part with a circular scab that looks like there was a dehiscence at some point, picture taken, see below, EP notified. Asked Dr. Hadley to assess patient. MD recommends admission.  Patient declines admission will adjust his BP medications and return Monday for another BP check and LDH check.      Hurricane Season: No

## 2020-04-24 NOTE — ASSESSMENT & PLAN NOTE
I am concerned with LDH even with new baseline in 500's.  This could be related to uncontrolled BP.  He does not appear dry.  Yesterday, I reviewed chart re: GIB episode attributed to possible ischemic colitis.  He has not had other episodes of bleeding.  I resumed ASA 81 mg qd yesterday and will continue as benefit >> risk at this time.  Repeat LDH with BP check at main campus Monday.

## 2020-04-24 NOTE — TELEPHONE ENCOUNTER
Spoke with Orestes Maurice and let him know that Dr. Wagner would like him to take Bactrim for 7 days. I let him know that I called in that prescription to his pharmacy. I advised Mr. Richards to call if his site gets worse or he has any other issues with it. Patient verbalized understanding and appreciated call.

## 2020-04-24 NOTE — TELEPHONE ENCOUNTER
----- Message from Jessica Sanchez MA sent at 4/24/2020  2:51 PM CDT -----  Regarding: FW: Incision Site      ----- Message -----  From: Ivette Guerrier RN  Sent: 4/24/2020  12:52 PM CDT  To: [facility] Arrhythmia Device Staff, #  Subject: Incision Site                                    Good Afternoon, I am sorry if this has gone to the wrong people, I am still learning my way around this. This patient came in today after having some unrelated issues and as he was leaving he showed me his incision for his device placement on his LCW. It appears slightly swollen, it looks like the incision may have opened and then healed back up and now has a scab. The patient stated that up until about last week he had been bleeding. I was able to take a picture and upload it to the patient's chart, was just hoping someone might be able to take a look. Thank you!

## 2020-04-24 NOTE — ASSESSMENT & PLAN NOTE
I STRONGLY RECOMMENDED TO PATIENT AND WIFE THAT HE BE ADMITTED FOR CONTROL OF BP AND TO OBSERVE LDH; HE DECLINES ADMISSION DUE TO COVID CONCERNS AND EXPENSE AGAINST WIFE'S OBJECTION--SHE WAS VERY SUPPORTIVE OF ADMITTING HIM  He will resume doxazosin 8 mg BID starting with supper (approx 12 hr from AM dose); continue amlodipine 10 mg q hs  He will not take isosorbide--has not been doing this--and I will remove from list as he uses PDE-5 prn  He will take hydralazine 25 mg q 6 hr prn doppler BP > 85 mm Hg and take dose when he arrives home  He will come Monday for BP check and repeat LDH

## 2020-04-25 RX ORDER — SULFAMETHOXAZOLE AND TRIMETHOPRIM 800; 160 MG/1; MG/1
1 TABLET ORAL DAILY
Qty: 7 TABLET | Refills: 0 | Status: ON HOLD
Start: 2020-04-25 | End: 2020-04-29 | Stop reason: HOSPADM

## 2020-04-27 ENCOUNTER — CLINICAL SUPPORT (OUTPATIENT)
Dept: TRANSPLANT | Facility: CLINIC | Age: 55
End: 2020-04-27
Payer: COMMERCIAL

## 2020-04-27 ENCOUNTER — HOSPITAL ENCOUNTER (INPATIENT)
Facility: HOSPITAL | Age: 55
LOS: 2 days | Discharge: HOME | DRG: 641 | End: 2020-04-29
Attending: INTERNAL MEDICINE | Admitting: INTERNAL MEDICINE
Payer: COMMERCIAL

## 2020-04-27 VITALS — SYSTOLIC BLOOD PRESSURE: 100 MMHG | HEART RATE: 80 BPM

## 2020-04-27 DIAGNOSIS — I50.42 CHRONIC COMBINED SYSTOLIC AND DIASTOLIC HEART FAILURE: ICD-10-CM

## 2020-04-27 DIAGNOSIS — N18.30 CKD (CHRONIC KIDNEY DISEASE), STAGE III: Chronic | ICD-10-CM

## 2020-04-27 DIAGNOSIS — I10 HYPERTENSION: ICD-10-CM

## 2020-04-27 DIAGNOSIS — I42.8 NICM (NONISCHEMIC CARDIOMYOPATHY): Chronic | ICD-10-CM

## 2020-04-27 DIAGNOSIS — T46.2X5A AMIODARONE-INDUCED HYPERTHYROIDISM: Chronic | ICD-10-CM

## 2020-04-27 DIAGNOSIS — Z95.810 BIVENTRICULAR IMPLANTABLE CARDIOVERTER-DEFIBRILLATOR IN SITU: Chronic | ICD-10-CM

## 2020-04-27 DIAGNOSIS — Z95.811 LVAD (LEFT VENTRICULAR ASSIST DEVICE) PRESENT: Primary | ICD-10-CM

## 2020-04-27 DIAGNOSIS — R74.02 ELEVATED LDH: ICD-10-CM

## 2020-04-27 DIAGNOSIS — I47.20 VT (VENTRICULAR TACHYCARDIA): Chronic | ICD-10-CM

## 2020-04-27 DIAGNOSIS — E05.80 AMIODARONE-INDUCED HYPERTHYROIDISM: Chronic | ICD-10-CM

## 2020-04-27 DIAGNOSIS — I10 ESSENTIAL HYPERTENSION: Chronic | ICD-10-CM

## 2020-04-27 LAB
ALBUMIN SERPL BCP-MCNC: 3.6 G/DL (ref 3.5–5.2)
ALP SERPL-CCNC: 96 U/L (ref 55–135)
ALT SERPL W/O P-5'-P-CCNC: 42 U/L (ref 10–44)
ANION GAP SERPL CALC-SCNC: 15 MMOL/L (ref 8–16)
APTT BLDCRRT: 38.3 SEC (ref 21–32)
AST SERPL-CCNC: 31 U/L (ref 10–40)
BASOPHILS # BLD AUTO: 0.02 K/UL (ref 0–0.2)
BASOPHILS NFR BLD: 0.2 % (ref 0–1.9)
BILIRUB DIRECT SERPL-MCNC: 0.3 MG/DL (ref 0.1–0.3)
BILIRUB SERPL-MCNC: 0.5 MG/DL (ref 0.1–1)
BNP SERPL-MCNC: 241 PG/ML (ref 0–99)
BUN SERPL-MCNC: 23 MG/DL (ref 6–20)
CALCIUM SERPL-MCNC: 9.7 MG/DL (ref 8.7–10.5)
CHLORIDE SERPL-SCNC: 97 MMOL/L (ref 95–110)
CO2 SERPL-SCNC: 24 MMOL/L (ref 23–29)
CREAT SERPL-MCNC: 1.9 MG/DL (ref 0.5–1.4)
CRP SERPL-MCNC: 40.4 MG/L (ref 0–8.2)
DIFFERENTIAL METHOD: ABNORMAL
EOSINOPHIL # BLD AUTO: 0 K/UL (ref 0–0.5)
EOSINOPHIL NFR BLD: 0 % (ref 0–8)
ERYTHROCYTE [DISTWIDTH] IN BLOOD BY AUTOMATED COUNT: 16.9 % (ref 11.5–14.5)
EST. GFR  (AFRICAN AMERICAN): 45.5 ML/MIN/1.73 M^2
EST. GFR  (NON AFRICAN AMERICAN): 39.4 ML/MIN/1.73 M^2
GLUCOSE SERPL-MCNC: 132 MG/DL (ref 70–110)
HCT VFR BLD AUTO: 45.9 % (ref 40–54)
HGB BLD-MCNC: 13.9 G/DL (ref 14–18)
IMM GRANULOCYTES # BLD AUTO: 0.26 K/UL (ref 0–0.04)
IMM GRANULOCYTES NFR BLD AUTO: 2.5 % (ref 0–0.5)
INR PPP: 3.1 (ref 0.8–1.2)
LYMPHOCYTES # BLD AUTO: 0.4 K/UL (ref 1–4.8)
LYMPHOCYTES NFR BLD: 3.6 % (ref 18–48)
MAGNESIUM SERPL-MCNC: 2.1 MG/DL (ref 1.6–2.6)
MCH RBC QN AUTO: 26.7 PG (ref 27–31)
MCHC RBC AUTO-ENTMCNC: 30.3 G/DL (ref 32–36)
MCV RBC AUTO: 88 FL (ref 82–98)
MONOCYTES # BLD AUTO: 0.5 K/UL (ref 0.3–1)
MONOCYTES NFR BLD: 5 % (ref 4–15)
NEUTROPHILS # BLD AUTO: 9.4 K/UL (ref 1.8–7.7)
NEUTROPHILS NFR BLD: 88.7 % (ref 38–73)
NRBC BLD-RTO: 0 /100 WBC
PHOSPHATE SERPL-MCNC: 3.2 MG/DL (ref 2.7–4.5)
PLATELET # BLD AUTO: 177 K/UL (ref 150–350)
PMV BLD AUTO: 11 FL (ref 9.2–12.9)
POTASSIUM SERPL-SCNC: 4.4 MMOL/L (ref 3.5–5.1)
PREALB SERPL-MCNC: 35 MG/DL (ref 20–43)
PROT SERPL-MCNC: 7.2 G/DL (ref 6–8.4)
PROTHROMBIN TIME: 28.8 SEC (ref 9–12.5)
RBC # BLD AUTO: 5.21 M/UL (ref 4.6–6.2)
SODIUM SERPL-SCNC: 136 MMOL/L (ref 136–145)
T4 FREE SERPL-MCNC: 0.82 NG/DL (ref 0.71–1.51)
TSH SERPL DL<=0.005 MIU/L-ACNC: 5.78 UIU/ML (ref 0.4–4)
WBC # BLD AUTO: 10.55 K/UL (ref 3.9–12.7)

## 2020-04-27 PROCEDURE — 20600001 HC STEP DOWN PRIVATE ROOM

## 2020-04-27 PROCEDURE — 84134 ASSAY OF PREALBUMIN: CPT

## 2020-04-27 PROCEDURE — 25000003 PHARM REV CODE 250: Performed by: STUDENT IN AN ORGANIZED HEALTH CARE EDUCATION/TRAINING PROGRAM

## 2020-04-27 PROCEDURE — 80048 BASIC METABOLIC PNL TOTAL CA: CPT

## 2020-04-27 PROCEDURE — 85025 COMPLETE CBC W/AUTO DIFF WBC: CPT

## 2020-04-27 PROCEDURE — 85610 PROTHROMBIN TIME: CPT

## 2020-04-27 PROCEDURE — 83735 ASSAY OF MAGNESIUM: CPT

## 2020-04-27 PROCEDURE — 84439 ASSAY OF FREE THYROXINE: CPT

## 2020-04-27 PROCEDURE — 99999 PR PBB SHADOW E&M-EST. PATIENT-LVL I: ICD-10-PCS | Mod: PBBFAC,,,

## 2020-04-27 PROCEDURE — 84443 ASSAY THYROID STIM HORMONE: CPT

## 2020-04-27 PROCEDURE — 99999 PR PBB SHADOW E&M-EST. PATIENT-LVL I: CPT | Mod: PBBFAC,,,

## 2020-04-27 PROCEDURE — 83880 ASSAY OF NATRIURETIC PEPTIDE: CPT

## 2020-04-27 PROCEDURE — 36415 COLL VENOUS BLD VENIPUNCTURE: CPT

## 2020-04-27 PROCEDURE — 85730 THROMBOPLASTIN TIME PARTIAL: CPT

## 2020-04-27 PROCEDURE — 80076 HEPATIC FUNCTION PANEL: CPT

## 2020-04-27 PROCEDURE — 84100 ASSAY OF PHOSPHORUS: CPT

## 2020-04-27 PROCEDURE — 27000248 HC VAD-ADDITIONAL DAY

## 2020-04-27 PROCEDURE — 86140 C-REACTIVE PROTEIN: CPT

## 2020-04-27 RX ORDER — AMIODARONE HYDROCHLORIDE 200 MG/1
400 TABLET ORAL DAILY
Status: DISCONTINUED | OUTPATIENT
Start: 2020-04-28 | End: 2020-04-29 | Stop reason: HOSPADM

## 2020-04-27 RX ORDER — ALLOPURINOL 100 MG/1
100 TABLET ORAL DAILY
Status: DISCONTINUED | OUTPATIENT
Start: 2020-04-28 | End: 2020-04-29 | Stop reason: HOSPADM

## 2020-04-27 RX ORDER — SPIRONOLACTONE 25 MG/1
25 TABLET ORAL DAILY
Status: DISCONTINUED | OUTPATIENT
Start: 2020-04-28 | End: 2020-04-29 | Stop reason: HOSPADM

## 2020-04-27 RX ORDER — METHIMAZOLE 10 MG/1
10 TABLET ORAL DAILY
Status: DISCONTINUED | OUTPATIENT
Start: 2020-04-28 | End: 2020-04-27

## 2020-04-27 RX ORDER — ASPIRIN 81 MG/1
81 TABLET ORAL DAILY
Status: DISCONTINUED | OUTPATIENT
Start: 2020-04-28 | End: 2020-04-27

## 2020-04-27 RX ORDER — METHIMAZOLE 10 MG/1
10 TABLET ORAL DAILY
Status: DISCONTINUED | OUTPATIENT
Start: 2020-04-28 | End: 2020-04-29

## 2020-04-27 RX ORDER — METHIMAZOLE 10 MG/1
20 TABLET ORAL DAILY
Status: DISCONTINUED | OUTPATIENT
Start: 2020-04-28 | End: 2020-04-27

## 2020-04-27 RX ORDER — ZOLPIDEM TARTRATE 5 MG/1
10 TABLET ORAL NIGHTLY PRN
Status: DISCONTINUED | OUTPATIENT
Start: 2020-04-27 | End: 2020-04-29 | Stop reason: HOSPADM

## 2020-04-27 RX ORDER — AMLODIPINE BESYLATE 10 MG/1
10 TABLET ORAL DAILY
Status: DISCONTINUED | OUTPATIENT
Start: 2020-04-27 | End: 2020-04-29 | Stop reason: HOSPADM

## 2020-04-27 RX ORDER — DOXAZOSIN 8 MG/1
8 TABLET ORAL DAILY
Status: DISCONTINUED | OUTPATIENT
Start: 2020-04-28 | End: 2020-04-27

## 2020-04-27 RX ORDER — DOXAZOSIN 8 MG/1
8 TABLET ORAL EVERY 12 HOURS
Status: DISCONTINUED | OUTPATIENT
Start: 2020-04-27 | End: 2020-04-29 | Stop reason: HOSPADM

## 2020-04-27 RX ORDER — AMLODIPINE BESYLATE 10 MG/1
10 TABLET ORAL DAILY
Status: DISCONTINUED | OUTPATIENT
Start: 2020-04-28 | End: 2020-04-27

## 2020-04-27 RX ORDER — FLUTICASONE PROPIONATE 50 MCG
2 SPRAY, SUSPENSION (ML) NASAL DAILY
Status: DISCONTINUED | OUTPATIENT
Start: 2020-04-28 | End: 2020-04-29 | Stop reason: HOSPADM

## 2020-04-27 RX ORDER — PREDNISONE 20 MG/1
40 TABLET ORAL DAILY
Status: DISCONTINUED | OUTPATIENT
Start: 2020-04-28 | End: 2020-04-28

## 2020-04-27 RX ORDER — PANTOPRAZOLE SODIUM 40 MG/1
40 TABLET, DELAYED RELEASE ORAL DAILY
Status: DISCONTINUED | OUTPATIENT
Start: 2020-04-28 | End: 2020-04-29 | Stop reason: HOSPADM

## 2020-04-27 RX ORDER — FERROUS GLUCONATE 324(37.5)
324 TABLET ORAL
Status: DISCONTINUED | OUTPATIENT
Start: 2020-04-28 | End: 2020-04-29 | Stop reason: HOSPADM

## 2020-04-27 RX ORDER — METHIMAZOLE 10 MG/1
10 TABLET ORAL DAILY
Status: DISCONTINUED | OUTPATIENT
Start: 2020-04-27 | End: 2020-04-29

## 2020-04-27 RX ADMIN — METHIMAZOLE 10 MG: 10 TABLET ORAL at 08:04

## 2020-04-27 RX ADMIN — SODIUM CHLORIDE 500 ML: 0.9 INJECTION, SOLUTION INTRAVENOUS at 08:04

## 2020-04-27 RX ADMIN — DOXAZOSIN 8 MG: 8 TABLET ORAL at 09:04

## 2020-04-27 RX ADMIN — ZOLPIDEM TARTRATE 10 MG: 5 TABLET ORAL at 09:04

## 2020-04-27 RX ADMIN — AMLODIPINE BESYLATE 10 MG: 10 TABLET ORAL at 09:04

## 2020-04-27 NOTE — PROGRESS NOTES
Date of Implant with Heartmate II LVAD: 3/8/2018    PATIENT ARRIVED IN CLINIC:  Ambulatory   Accompanied by: Wife    Vitals  Temperature, oral:   Temp Readings from Last 1 Encounters:   04/24/20 99.2 °F (37.3 °C)     Blood Pressure:   BP Readings from Last 3 Encounters:   04/27/20 (!) 100/0   04/24/20 (!) 100/0   03/13/20 (!) 88/0        VAD Interrogation:  TXP SACHI INTERROGATIONS 4/27/2020 4/24/2020 3/23/2020   Type HeartMate3 HeartMate II HeartMate II   Flow 6.1 5.7 6.2   Speed 9800 9800 9800   PI 4.1 4.3 4.5   Power ([place]) 6.9 6.7 7.0   LSL - 9400 -   Pulsatility Pulse - -       VAD Sounds: HM2 Smooth    HCT:   Lab Results   Component Value Date    HCT 45.9 04/23/2020    HCT 35 (L) 03/09/2020       Complaints/reason for visit today: routine  Emergency Equipment With Patient: yes     Any Equipment Issues: no (Refer to  note for complete details)      Labs:    Chemistry        Component Value Date/Time     04/23/2020 1205    K 4.3 04/23/2020 1205    CL 98 04/23/2020 1205    CO2 25 04/23/2020 1205    BUN 22 (H) 04/23/2020 1205    CREATININE 1.9 (H) 04/23/2020 1205     (H) 04/23/2020 1205        Component Value Date/Time    CALCIUM 9.6 04/23/2020 1205    ALKPHOS 117 04/23/2020 1205    AST 27 04/23/2020 1205    ALT 45 (H) 04/23/2020 1205    BILITOT 0.5 04/23/2020 1205    ESTGFRAFRICA 46 (A) 04/23/2020 1205    EGFRNONAA 39 (A) 04/23/2020 1205            Magnesium   Date Value Ref Range Status   04/23/2020 2.2 1.6 - 2.6 mg/dL Final       Lab Results   Component Value Date    WBC 9.79 04/23/2020    HGB 13.9 (L) 04/23/2020    HCT 45.9 04/23/2020    MCV 88 04/23/2020     (L) 04/23/2020       Lab Results   Component Value Date    INR 2.3 (H) 04/23/2020    INR 3.4 (H) 04/16/2020    INR 2.4 (H) 04/09/2020       BNP   Date Value Ref Range Status   04/23/2020 166 (H) 0 - 99 pg/mL Final     Comment:     Values of less than 100 pg/ml are consistent with non-CHF populations.   03/23/2020  180 (H) 0 - 99 pg/mL Final     Comment:     Values of less than 100 pg/ml are consistent with non-CHF populations.   03/20/2020 212 (H) 0 - 99 pg/mL Final     Comment:     Values of less than 100 pg/ml are consistent with non-CHF populations.       LD   Date Value Ref Range Status   04/24/2020 732 (H) 110 - 260 U/L Final     Comment:     Results are increased in hemolyzed samples.   04/23/2020 735 (H) 110 - 260 U/L Final     Comment:     Results are increased in hemolyzed samples.   03/23/2020 540 (H) 110 - 260 U/L Final     Comment:     Results are increased in hemolyzed samples.       Labs reviewed with patient: YES Just LDH was drawn     Patient Satisfaction Survey completed per patient: No  (explained about signature and box to check)  Medication reconciliation: per MA.  Medication Detail updated today: yes  [facility]    Education: Reviewed driveline care, emergency procedures, how to change the controller, alarms with patient, as well as discussed how to page the VAD coordinator in case of an emergency.     Plans/Needs: Patient seen in clinic for BP check and LDH check. Patient's MAP was 72 and patient was pulsatile. LDH however increased to 820. Case discussed with Dr. Bautista who recommended patient should be admitted to floor. Patient agreed to plan.   Hurricane Season: No

## 2020-04-28 LAB
ANION GAP SERPL CALC-SCNC: 10 MMOL/L (ref 8–16)
APTT BLDCRRT: 35.1 SEC (ref 21–32)
ASCENDING AORTA: 3.57 CM
BASOPHILS # BLD AUTO: 0.01 K/UL (ref 0–0.2)
BASOPHILS NFR BLD: 0.1 % (ref 0–1.9)
BSA FOR ECHO PROCEDURE: 2.52 M2
BUN SERPL-MCNC: 22 MG/DL (ref 6–20)
CALCIUM SERPL-MCNC: 9.2 MG/DL (ref 8.7–10.5)
CHLORIDE SERPL-SCNC: 97 MMOL/L (ref 95–110)
CO2 SERPL-SCNC: 26 MMOL/L (ref 23–29)
CREAT SERPL-MCNC: 1.5 MG/DL (ref 0.5–1.4)
CV ECHO LV RWT: 0.29 CM
DIFFERENTIAL METHOD: ABNORMAL
DOP CALC LVOT AREA: 5 CM2
DOP CALC LVOT DIAMETER: 2.53 CM
E WAVE DECELERATION TIME: 174.71 MSEC
E/A RATIO: 0.7
E/E' RATIO: 10 M/S
ECHO LV POSTERIOR WALL: 1.09 CM (ref 0.6–1.1)
EOSINOPHIL # BLD AUTO: 0 K/UL (ref 0–0.5)
EOSINOPHIL NFR BLD: 0.3 % (ref 0–8)
ERYTHROCYTE [DISTWIDTH] IN BLOOD BY AUTOMATED COUNT: 17.1 % (ref 11.5–14.5)
EST. GFR  (AFRICAN AMERICAN): >60 ML/MIN/1.73 M^2
EST. GFR  (NON AFRICAN AMERICAN): 52.4 ML/MIN/1.73 M^2
GLUCOSE SERPL-MCNC: 82 MG/DL (ref 70–110)
HCT VFR BLD AUTO: 40.8 % (ref 40–54)
HGB BLD-MCNC: 12.5 G/DL (ref 14–18)
IMM GRANULOCYTES # BLD AUTO: 0.22 K/UL (ref 0–0.04)
IMM GRANULOCYTES NFR BLD AUTO: 2.4 % (ref 0–0.5)
INR PPP: 2.8 (ref 0.8–1.2)
INTERVENTRICULAR SEPTUM: 0.73 CM (ref 0.6–1.1)
LA MAJOR: 7.73 CM
LA MINOR: 6.96 CM
LA WIDTH: 6 CM
LDH SERPL L TO P-CCNC: 666 U/L (ref 110–260)
LEFT ATRIUM SIZE: 4.71 CM
LEFT ATRIUM VOLUME INDEX: 71.8 ML/M2
LEFT ATRIUM VOLUME: 175.95 CM3
LEFT VENTRICLE DIASTOLIC VOLUME INDEX: 182.42 ML/M2
LEFT VENTRICLE DIASTOLIC VOLUME: 447.33 ML
LEFT VENTRICLE MASS INDEX: 132 G/M2
LEFT VENTRICLE SYSTOLIC VOLUME INDEX: 168.1 ML/M2
LEFT VENTRICLE SYSTOLIC VOLUME: 412.27 ML
LEFT VENTRICULAR INTERNAL DIMENSION IN DIASTOLE: 7.5 CM (ref 3.5–6)
LEFT VENTRICULAR MASS: 323.15 G
LV LATERAL E/E' RATIO: 10 M/S
LV SEPTAL E/E' RATIO: 10 M/S
LYMPHOCYTES # BLD AUTO: 0.8 K/UL (ref 1–4.8)
LYMPHOCYTES NFR BLD: 8.4 % (ref 18–48)
MAGNESIUM SERPL-MCNC: 2.1 MG/DL (ref 1.6–2.6)
MCH RBC QN AUTO: 27.1 PG (ref 27–31)
MCHC RBC AUTO-ENTMCNC: 30.6 G/DL (ref 32–36)
MCV RBC AUTO: 89 FL (ref 82–98)
MONOCYTES # BLD AUTO: 0.8 K/UL (ref 0.3–1)
MONOCYTES NFR BLD: 9.1 % (ref 4–15)
MV PEAK A VEL: 0.57 M/S
MV PEAK E VEL: 0.4 M/S
NEUTROPHILS # BLD AUTO: 7.3 K/UL (ref 1.8–7.7)
NEUTROPHILS NFR BLD: 79.7 % (ref 38–73)
NRBC BLD-RTO: 0 /100 WBC
PHOSPHATE SERPL-MCNC: 3 MG/DL (ref 2.7–4.5)
PLATELET # BLD AUTO: 156 K/UL (ref 150–350)
PMV BLD AUTO: 11.2 FL (ref 9.2–12.9)
POTASSIUM SERPL-SCNC: 3.6 MMOL/L (ref 3.5–5.1)
PROTHROMBIN TIME: 26.4 SEC (ref 9–12.5)
RA MAJOR: 6.64 CM
RA PRESSURE: 15 MMHG
RA WIDTH: 5.64 CM
RBC # BLD AUTO: 4.61 M/UL (ref 4.6–6.2)
RIGHT VENTRICULAR END-DIASTOLIC DIMENSION: 4.3 CM
RV TISSUE DOPPLER FREE WALL SYSTOLIC VELOCITY 1 (APICAL 4 CHAMBER VIEW): 8.9 CM/S
SARS-COV-2 RNA RESP QL NAA+PROBE: NOT DETECTED
SINUS: 3.86 CM
SODIUM SERPL-SCNC: 133 MMOL/L (ref 136–145)
STJ: 3.22 CM
TDI LATERAL: 0.04 M/S
TDI SEPTAL: 0.04 M/S
TDI: 0.04 M/S
TRICUSPID ANNULAR PLANE SYSTOLIC EXCURSION: 1.51 CM
WBC # BLD AUTO: 9.2 K/UL (ref 3.9–12.7)

## 2020-04-28 PROCEDURE — 63600175 PHARM REV CODE 636 W HCPCS: Performed by: STUDENT IN AN ORGANIZED HEALTH CARE EDUCATION/TRAINING PROGRAM

## 2020-04-28 PROCEDURE — 85730 THROMBOPLASTIN TIME PARTIAL: CPT

## 2020-04-28 PROCEDURE — 27000248 HC VAD-ADDITIONAL DAY

## 2020-04-28 PROCEDURE — 25000003 PHARM REV CODE 250: Performed by: INTERNAL MEDICINE

## 2020-04-28 PROCEDURE — 84100 ASSAY OF PHOSPHORUS: CPT

## 2020-04-28 PROCEDURE — 25500020 PHARM REV CODE 255: Performed by: STUDENT IN AN ORGANIZED HEALTH CARE EDUCATION/TRAINING PROGRAM

## 2020-04-28 PROCEDURE — 99223 1ST HOSP IP/OBS HIGH 75: CPT | Mod: ,,, | Performed by: INTERNAL MEDICINE

## 2020-04-28 PROCEDURE — 25500020 PHARM REV CODE 255: Performed by: INTERNAL MEDICINE

## 2020-04-28 PROCEDURE — 85610 PROTHROMBIN TIME: CPT

## 2020-04-28 PROCEDURE — 36415 COLL VENOUS BLD VENIPUNCTURE: CPT

## 2020-04-28 PROCEDURE — 99223 PR INITIAL HOSPITAL CARE,LEVL III: ICD-10-PCS | Mod: ,,, | Performed by: INTERNAL MEDICINE

## 2020-04-28 PROCEDURE — 93750 INTERROGATION VAD IN PERSON: CPT | Mod: ,,, | Performed by: INTERNAL MEDICINE

## 2020-04-28 PROCEDURE — 93750 PR INTERROGATE VENT ASSIST DEV, IN PERSON, W PHYSICIAN ANALYSIS: ICD-10-PCS | Mod: ,,, | Performed by: INTERNAL MEDICINE

## 2020-04-28 PROCEDURE — U0002 COVID-19 LAB TEST NON-CDC: HCPCS

## 2020-04-28 PROCEDURE — 25000003 PHARM REV CODE 250: Performed by: STUDENT IN AN ORGANIZED HEALTH CARE EDUCATION/TRAINING PROGRAM

## 2020-04-28 PROCEDURE — 85025 COMPLETE CBC W/AUTO DIFF WBC: CPT

## 2020-04-28 PROCEDURE — 20600001 HC STEP DOWN PRIVATE ROOM

## 2020-04-28 PROCEDURE — 83615 LACTATE (LD) (LDH) ENZYME: CPT

## 2020-04-28 PROCEDURE — 83735 ASSAY OF MAGNESIUM: CPT

## 2020-04-28 PROCEDURE — 80048 BASIC METABOLIC PNL TOTAL CA: CPT

## 2020-04-28 RX ORDER — WARFARIN SODIUM 5 MG/1
5 TABLET ORAL
Status: DISCONTINUED | OUTPATIENT
Start: 2020-05-01 | End: 2020-04-29

## 2020-04-28 RX ORDER — ASPIRIN 81 MG/1
81 TABLET ORAL DAILY
Status: DISCONTINUED | OUTPATIENT
Start: 2020-04-28 | End: 2020-04-29 | Stop reason: HOSPADM

## 2020-04-28 RX ORDER — WARFARIN SODIUM 5 MG/1
5 TABLET ORAL
Status: DISCONTINUED | OUTPATIENT
Start: 2020-04-28 | End: 2020-04-29

## 2020-04-28 RX ORDER — WARFARIN 2.5 MG/1
2.5 TABLET ORAL
Status: DISCONTINUED | OUTPATIENT
Start: 2020-04-29 | End: 2020-04-29

## 2020-04-28 RX ORDER — WARFARIN 2.5 MG/1
2.5 TABLET ORAL
Status: DISCONTINUED | OUTPATIENT
Start: 2020-05-03 | End: 2020-04-29

## 2020-04-28 RX ADMIN — PANTOPRAZOLE SODIUM 40 MG: 40 TABLET, DELAYED RELEASE ORAL at 09:04

## 2020-04-28 RX ADMIN — ALLOPURINOL 100 MG: 100 TABLET ORAL at 09:04

## 2020-04-28 RX ADMIN — FERROUS GLUCONATE TAB 324 MG (37.5 MG ELEMENTAL IRON) 324 MG: 324 (37.5 FE) TAB at 09:04

## 2020-04-28 RX ADMIN — METHIMAZOLE 10 MG: 10 TABLET ORAL at 05:04

## 2020-04-28 RX ADMIN — DOXAZOSIN 8 MG: 8 TABLET ORAL at 10:04

## 2020-04-28 RX ADMIN — DOXAZOSIN 8 MG: 8 TABLET ORAL at 04:04

## 2020-04-28 RX ADMIN — AMLODIPINE BESYLATE 10 MG: 10 TABLET ORAL at 04:04

## 2020-04-28 RX ADMIN — METHIMAZOLE 10 MG: 10 TABLET ORAL at 09:04

## 2020-04-28 RX ADMIN — IOHEXOL 100 ML: 350 INJECTION, SOLUTION INTRAVENOUS at 03:04

## 2020-04-28 RX ADMIN — SPIRONOLACTONE 25 MG: 25 TABLET ORAL at 09:04

## 2020-04-28 RX ADMIN — PREDNISONE 40 MG: 20 TABLET ORAL at 09:04

## 2020-04-28 RX ADMIN — IOHEXOL 15 ML: 350 INJECTION, SOLUTION INTRAVENOUS at 02:04

## 2020-04-28 RX ADMIN — AMIODARONE HYDROCHLORIDE 400 MG: 200 TABLET ORAL at 09:04

## 2020-04-28 RX ADMIN — IOHEXOL 15 ML: 350 INJECTION, SOLUTION INTRAVENOUS at 12:04

## 2020-04-28 RX ADMIN — WARFARIN SODIUM 5 MG: 5 TABLET ORAL at 05:04

## 2020-04-28 NOTE — PROGRESS NOTES
MD Kirit made aware pt refuse covid 19 test. Pt last admit over a month ago pt was tested for different corona virus not covid 19, pt aware. MD stated to document pt refuse. WCTM

## 2020-04-28 NOTE — PROGRESS NOTES
"04/28/2020  Casper Harris    Current provider:  Amparo Lopez MD      As floor rounding has been requested to be limited during COVID-19 patient quarantine by Infectious Disease and leadership, only "electronic" rounding has been performed on this mechanical assist device patient.  Electronic rounding includes verifying in Epic that current settings and measurements for the device have been documented appropriately and that they are within limits.  Additionally, this rounding verifies that the EQUIPMENT section of the VAD flowsheet is documented in Epic, specifically checking the presence of emergency equipment and controller self test.  Any discrepancies will be related to the care team.    In emergency, the nursing units have been notified to contact the perfusion department either by:  Calling [phone] from 630am to 4pm, or by contacting the hospital  from 4pm to 630am and asking to speak with the perfusionist on call.    Casper Harris  7:33 AM  "

## 2020-04-28 NOTE — PROGRESS NOTES
MD Kirit made aware pt take doxazosin twice a day, amlodipine in evening, and warfarin in evening. INR tonight 3.1. MD stated he will change orders. WCTM

## 2020-04-28 NOTE — PLAN OF CARE
Recommendations  1. Continue cardiac diet as tolerated.   2. If PO intake decreases < 50%, add Optisource TID.   3. RD to monitor.     Goals: Adequate PO intake to meet % of EEN and EPN by RD follow up  Nutrition Goal Status: new  Communication of RD Recs: other (comment)(POC)   Shortness of Breath

## 2020-04-28 NOTE — SUBJECTIVE & OBJECTIVE
Past Medical History:   Diagnosis Date    CHF (congestive heart failure)     Dilated cardiomyopathy 1/10/2018    Disorder of kidney and ureter     CKD    Gout     HTN (hypertension)     Ventricular tachycardia (paroxysmal)        Past Surgical History:   Procedure Laterality Date    CARDIAC CATHETERIZATION  Dec. 2012    CARDIAC DEFIBRILLATOR PLACEMENT Left     CRRT-D    COLONOSCOPY N/A 3/6/2018    Procedure: COLONOSCOPY;  Surgeon: Alonso Bone MD;  Location: [facility] ENDO (2ND FLR);  Service: Endoscopy;  Laterality: N/A;    COLONOSCOPY N/A 7/17/2019    Procedure: COLONOSCOPY;  Surgeon: Blane Valdez MD;  Location: [facility] ENDO (2ND FLR);  Service: Endoscopy;  Laterality: N/A;    COLONOSCOPY N/A 7/18/2019    Procedure: COLONOSCOPY;  Surgeon: Blane Valdez MD;  Location: [facility] ENDO (2ND FLR);  Service: Endoscopy;  Laterality: N/A;    ESOPHAGOGASTRODUODENOSCOPY N/A 7/17/2019    Procedure: EGD (ESOPHAGOGASTRODUODENOSCOPY);  Surgeon: Blane Valdez MD;  Location: [facility] ENDO (2ND FLR);  Service: Endoscopy;  Laterality: N/A;    ESOPHAGOGASTRODUODENOSCOPY N/A 7/18/2019    Procedure: EGD (ESOPHAGOGASTRODUODENOSCOPY);  Surgeon: Blane Valdez MD;  Location: [facility] ENDO (2ND FLR);  Service: Endoscopy;  Laterality: N/A;    NONINVASIVE CARDIAC ELECTROPHYSIOLOGY STUDY N/A 10/18/2019    Procedure: CARDIAC ELECTROPHYSIOLOGY STUDY, NONINVASIVE;  Surgeon: Raz Wagner MD;  Location: [facility] EP LAB;  Service: Cardiology;  Laterality: N/A;  VT, DFTs, MDT CRTD in situ, LVAD, anes, MB, 3098    REPLACEMENT OF IMPLANTABLE CARDIOVERTER-DEFIBRILLATOR (ICD) GENERATOR N/A 3/9/2020    Procedure: REPLACEMENT, ICD GENERATOR;  Surgeon: Harry Yun MD;  Location: [facility] EP LAB;  Service: Cardiology;  Laterality: N/A;  VT, ICD Gen Change and Lead Revision, MDT, MAC, DM,3 Prep    REVISION OF IMPLANTABLE CARDIOVERTER-DEFIBRILLATOR (ICD) ELECTRODE LEAD PLACEMENT N/A 3/9/2020    Procedure: REVISION, INSERTION, ELECTRODE LEAD, ICD;  Surgeon: Harry SAUER  MD Jama;  Location: [facility] EP LAB;  Service: Cardiology;  Laterality: N/A;  VT, ICD Gen Change and Lead Revision, MDT, MAC, DM,3 Prep    TREATMENT OF CARDIAC ARRHYTHMIA  10/18/2019    Procedure: Cardioversion or Defibrillation;  Surgeon: Raz Wagner MD;  Location: [facility] EP LAB;  Service: Cardiology;;       Review of patient's allergies indicates:   Allergen Reactions    Lisinopril Anaphylaxis    Hydralazine analogues      Chronic constipation, impotence, dizziness       No current facility-administered medications on file prior to encounter.      Current Outpatient Medications on File Prior to Encounter   Medication Sig    allopurinol (ZYLOPRIM) 100 MG tablet TAKE 1 TABLET BY MOUTH EVERY DAY    amiodarone (PACERONE) 400 MG tablet Take 1 tablet (400 mg total) by mouth once daily.    amLODIPine (NORVASC) 10 MG tablet Take 1 tablet (10 mg total) by mouth once daily. (Patient taking differently: Take 10 mg by mouth every evening. )    aspirin (ECOTRIN) 81 MG EC tablet Take 1 tablet (81 mg total) by mouth once daily.    doxazosin (CARDURA) 8 MG Tab Take 1 tablet (8 mg total) by mouth once daily.    ferrous gluconate (FERGON) 324 MG tablet TAKE 1 TABLET (324 MG TOTAL) BY MOUTH DAILY WITH BREAKFAST.    fluticasone propionate (FLONASE) 50 mcg/actuation nasal spray 2 sprays (100 mcg total) by Each Nostril route once daily.    isosorbide dinitrate (ISORDIL) 20 MG tablet Take 1 tablet (20 mg total) by mouth 3 (three) times daily. (Patient not taking: Reported on 4/24/2020)    methIMAzole (TAPAZOLE) 10 MG Tab 20 mg in the morning, 10 mg in the afternoon    pantoprazole (PROTONIX) 40 MG tablet TAKE 1 TABLET (40 MG TOTAL) BY MOUTH ONCE DAILY.    potassium chloride (K-TAB) 20 mEq Take 2 tablets (40 mEq total) by mouth once daily.    predniSONE (DELTASONE) 20 MG tablet Take 2 tablets (40 mg total) by mouth once daily.    sildenafiL (VIAGRA) 100 MG tablet Take 1 tablet (100 mg total) by mouth daily as needed  for Erectile Dysfunction.    spironolactone (ALDACTONE) 25 MG tablet Take 1 tablet (25 mg total) by mouth once daily.    sulfamethoxazole-trimethoprim 800-160mg (BACTRIM DS) 800-160 mg Tab Take 1 tablet by mouth once daily. for 7 days    torsemide (DEMADEX) 20 MG Tab Take 2 tablets (40 mg total) by mouth once daily.    warfarin (COUMADIN) 5 MG tablet Take 5mg orally daily except 2.5mg orally on     zolpidem (AMBIEN) 10 mg Tab Take 1 tablet (10 mg total) by mouth nightly as needed.     Family History     Problem Relation (Age of Onset)    Cancer Sister (54)    Coronary artery disease Father    Diabetes Father    Heart disease Father    Hypertension Father    No Known Problems Mother, Brother        Tobacco Use    Smoking status: Former Smoker     Packs/day: 1.00     Years: 31.00     Pack years: 31.00     Types: Cigarettes     Last attempt to quit: 2018     Years since quittin.2    Smokeless tobacco: Never Used    Tobacco comment: pt is quiting on his own - pt stated not qualified for program;  pt  quit on his own   Substance and Sexual Activity    Alcohol use: No     Alcohol/week: 0.0 standard drinks     Frequency: Never     Drinks per session: Patient refused     Binge frequency: Never     Comment: one fifth of gin or rum/week    Drug use: No    Sexual activity: Yes     Partners: Female     Birth control/protection: None     Comment: 10/5/17  with same partner 7 years      Review of Systems   Constitution: Positive for malaise/fatigue. Negative for chills and decreased appetite.   HENT: Negative for congestion.    Cardiovascular: Negative for chest pain, irregular heartbeat and leg swelling.   Respiratory: Positive for shortness of breath. Negative for cough.    Endocrine: Negative for cold intolerance and heat intolerance.   Skin: Negative for rash.   Musculoskeletal: Negative for arthritis and back pain.   Gastrointestinal: Negative for abdominal pain, constipation and  diarrhea.   Genitourinary: Negative for dysuria and hematuria.   Neurological: Negative for dizziness and headaches.   Psychiatric/Behavioral: Negative for altered mental status.     Objective:     Vital Signs (Most Recent):  Temp: 98.4 °F (36.9 °C) (04/27/20 2000)  Pulse: 76 (04/27/20 2300)  Resp: 20 (04/27/20 2000)  BP: 103/71 (04/27/20 2320)  SpO2: 99 % (04/27/20 2000) Vital Signs (24h Range):  Temp:  [98.4 °F (36.9 °C)-99 °F (37.2 °C)] 98.4 °F (36.9 °C)  Pulse:  [76-84] 76  Resp:  [18-20] 20  SpO2:  [95 %-99 %] 99 %  BP: ()/(0-71) 103/71     Weight: 121.9 kg (268 lb 11.9 oz)  Body mass index is 35.46 kg/m².    SpO2: 99 %  O2 Device (Oxygen Therapy): room air    No intake or output data in the 24 hours ending 04/27/20 2343    Lines/Drains/Airways     Line                 VAD 03/08/18 1124 Left ventricular assist device HeartMate  days          Peripheral Intravenous Line                 Peripheral IV - Single Lumen 20 G Anterior;Left Forearm -- days                Physical Exam   Constitutional: He is oriented to person, place, and time. He appears well-developed and well-nourished. No distress.   HENT:   Head: Normocephalic.   Left Ear: External ear normal.   Eyes: Pupils are equal, round, and reactive to light. Right eye exhibits no discharge. Left eye exhibits no discharge.   Neck: Normal range of motion. No thyromegaly present.   Cardiovascular: Normal rate and regular rhythm. Exam reveals no friction rub.   No murmur heard.  Pulmonary/Chest: Effort normal and breath sounds normal. No respiratory distress.   Abdominal: Soft. Bowel sounds are normal. He exhibits no distension. There is no tenderness.   Musculoskeletal: Normal range of motion. He exhibits no edema.   Neurological: He is alert and oriented to person, place, and time. No cranial nerve deficit.   Skin:        Skin scar for BiV ICD   VAD in place        Significant Labs:   CMP   Recent Labs   Lab 04/27/20  1800      K 4.4   CL 97    CO2 24   *   BUN 23*   CREATININE 1.9*   CALCIUM 9.7   PROT 7.2   ALBUMIN 3.6   BILITOT 0.5   ALKPHOS 96   AST 31   ALT 42   ANIONGAP 15   ESTGFRAFRICA 45.5*   EGFRNONAA 39.4*   , CBC   Recent Labs   Lab 04/27/20  1800   WBC 10.55   HGB 13.9*   HCT 45.9      , INR   Recent Labs   Lab 04/27/20 2027   INR 3.1*    and All pertinent lab results from the last 24 hours have been reviewed.

## 2020-04-28 NOTE — HPI
Mr. Tim Richards, 54 y.o. male with PMHx significant for NICMP LVAD HM 2 with implant date: 3/8/2018; with recent Change in his RPM to 9800. He presented to clinic to repeat his LDH (new baseline LDH is in 500s). Repeated LDH showed 820. He reported vague symptoms of being fatigue and generally weak than baseline. He denies chest pain, LVAD alarm, reported complaint with medication. He reported change in his prednisone dose for amiodarone induced thyrotoxicosis based line endocrine recommendation. Repeated Labs showed elevated LDH and no alarm on VAD noted. Admitted for work of elevated LDH.

## 2020-04-28 NOTE — ASSESSMENT & PLAN NOTE
- Baseline sCr ~ 1.7-1.9 and current sCr is 1.9  - Avoid NSAIDs, contrast, nephrotoxins and monitor strict I/Os, daily weights

## 2020-04-28 NOTE — HPI
Tim Richards is a 54 y.o. 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 That was admitted to [facility] [place] for elevated LDH concern for dehydration. Otherwise, he denies palpitations, chest pain, shortness of breath, fever, abd pain, N/V/D. Pt on Amiodarone since 2012, Currently on Amiodarone 400mg daily. Pt was Dr. Piedra in the past, started on prednisone and MMI in 12/2019. He report weight gain 20-30lb since that time. Last saw Dr Huerta, TSH: 5.4, FT4: 0.94, regiment was decrease to prednisone to 40 mg daily and methimazole to 20 mg in the morning, 10 mg in the evening. Of note, AIH been difficult to controlled as level would increase when weaning down steroid. Last visit prednisone dose reduced as low as 20 mg daily however he required dose to be raised again. In the past has been issues with compliance and skip doses but he reports no missed doses recently. Pt was consulted with endocrinology for management of Amiodarone induced hyperthyroidism to taper PDN.

## 2020-04-28 NOTE — PT/OT/SLP PROGRESS
Occupational Therapy Screen      Patient Name:  Tim Richards   MRN:  [id number]    Pt is a 55 y/o male with LVAD in place, implanted 3/8/2018, who presents with elevated LDH. OT orders received and acknowledged. Pt reports he is at baseline mobility, independent with all mobility, ADLs, and VAD management. Pt reports he has been ambulating independently since admission, denies any dizziness, SOB, or recent falls. Pt reported of still driving and working, PTA. Pt refusing all out of bed activity this date. Pt denies need for acute skilled therapy intervention at this time. OT provided education to pt regarding importance of maintaining frequent activity and ambulating while admitted to maintain independent level of mobility and self care. Pt verbalized understanding. Pt does not require further acute skilled therapy intervention. Discharge from OT services and re-consult if pt experiences a change in status. No billable units this date.       Ute Mejía, OT  4/28/2020

## 2020-04-28 NOTE — ASSESSMENT & PLAN NOTE
- Chronic long standing issue, Pt on Amiodarone since 2012, Currently on Amiodarone 400mg daily  - TRAb (-), responsive to steroid, most likely Amiodarone induced hypothyroidism type 2  - Thyroid US from 1/13/20 Showing Thyromegaly and Normal vascularity which is suggestive of Type 2 Amiodarone induced hyperthyroidism  - Clinically Euthyroid, Been on prednisone since 12/2019  - AIH been difficult to controlled as level would increase when weaning down steroid    Plan  - While inpatient  - Agree with methimazole decrease to 10mg BID now  - Recommend decrease Prednisone to 30mg QD, starting 4/29   - Duration of taper will be difficult given hx of rebound hyperthyroidism      -*Tentatively will plan for further taper with decreasing from 30mg to 20mg to 10mg every 2 weeks and then 5 mg for 2 weeks   - Will taper methimazole as well, will consider 10mg QD on D/C   - Will schedule TSH and FT4 in 3 weeks in clinic setting to check levels  - Will give final recs near Discharge

## 2020-04-28 NOTE — H&P
[facility]  Heart Transplant  H&P    Patient Name: Tim Richards  MRN: [id number]  Admission Date: 4/27/2020  Attending Physician: Amparo Lopez MD  Primary Care Provider: Power Connors MD  Principal Problem:Elevated LDH    Subjective:     History of Present Illness:  Mr. Tim Richards, 54 y.o. male with PMHx significant for NICMP LVAD HM 2 with implant date: 3/8/2018; with recent Change in his RPM to 9800. He presented to clinic to repeat his LDH (new baseline LDH is in 500s). Repeated LDH showed 820. He reported vague symptoms of being fatigue and generally weak than baseline. He denies chest pain, LVAD alarm, reported complaint with medication. He reported change in his prednisone dose for amiodarone induced thyrotoxicosis based line endocrine recommendation. Repeated Labs showed elevated LDH and no alarm on VAD noted. Admitted for work of elevated LDH.     Past Medical History:   Diagnosis Date    CHF (congestive heart failure)     Dilated cardiomyopathy 1/10/2018    Disorder of kidney and ureter     CKD    Gout     HTN (hypertension)     Ventricular tachycardia (paroxysmal)        Past Surgical History:   Procedure Laterality Date    CARDIAC CATHETERIZATION  Dec. 2012    CARDIAC DEFIBRILLATOR PLACEMENT Left     CRRT-D    COLONOSCOPY N/A 3/6/2018    Procedure: COLONOSCOPY;  Surgeon: Alonso Bone MD;  Location: [facility] ([address] [place] [address]);  Service: Endoscopy;  Laterality: N/A;    COLONOSCOPY N/A 7/17/2019    Procedure: COLONOSCOPY;  Surgeon: Blane Valdez MD;  Location: [address]);  Service: Endoscopy;  Laterality: N/A;    COLONOSCOPY N/A 7/18/2019    Procedure: COLONOSCOPY;  Surgeon: Blane Valdez MD;  Location: [address]);  Service: Endoscopy;  Laterality: N/A;    ESOPHAGOGASTRODUODENOSCOPY N/A 7/17/2019    Procedure: EGD (ESOPHAGOGASTRODUODENOSCOPY);  Surgeon: Blane Valdez MD;  Location: [address]);  Service: Endoscopy;  Laterality: N/A;     ESOPHAGOGASTRODUODENOSCOPY N/A 7/18/2019    Procedure: EGD (ESOPHAGOGASTRODUODENOSCOPY);  Surgeon: Blane Valdez MD;  Location: [facility] ([address] [place] [address]);  Service: Endoscopy;  Laterality: N/A;    NONINVASIVE CARDIAC ELECTROPHYSIOLOGY STUDY N/A 10/18/2019    Procedure: CARDIAC ELECTROPHYSIOLOGY STUDY, NONINVASIVE;  Surgeon: Raz Wagner MD;  Location: [facility] EP LAB;  Service: Cardiology;  Laterality: N/A;  VT, DFTs, MDT CRTD in situ, LVAD, juarez, MB, 3098    REPLACEMENT OF IMPLANTABLE CARDIOVERTER-DEFIBRILLATOR (ICD) GENERATOR N/A 3/9/2020    Procedure: REPLACEMENT, ICD GENERATOR;  Surgeon: Harry Yun MD;  Location: [facility] EP LAB;  Service: Cardiology;  Laterality: N/A;  VT, ICD Gen Change and Lead Revision, MDT, MAC, DM,3 Prep    REVISION OF IMPLANTABLE CARDIOVERTER-DEFIBRILLATOR (ICD) ELECTRODE LEAD PLACEMENT N/A 3/9/2020    Procedure: REVISION, INSERTION, ELECTRODE LEAD, ICD;  Surgeon: Harry Yun MD;  Location: [facility] EP LAB;  Service: Cardiology;  Laterality: N/A;  VT, ICD Gen Change and Lead Revision, MDT, MAC, DM,3 Prep    TREATMENT OF CARDIAC ARRHYTHMIA  10/18/2019    Procedure: Cardioversion or Defibrillation;  Surgeon: Raz Wagner MD;  Location: [facility] EP LAB;  Service: Cardiology;;       Review of patient's allergies indicates:   Allergen Reactions    Lisinopril Anaphylaxis    Hydralazine analogues      Chronic constipation, impotence, dizziness       No current facility-administered medications on file prior to encounter.      Current Outpatient Medications on File Prior to Encounter   Medication Sig    allopurinol (ZYLOPRIM) 100 MG tablet TAKE 1 TABLET BY MOUTH EVERY DAY    amiodarone (PACERONE) 400 MG tablet Take 1 tablet (400 mg total) by mouth once daily.    amLODIPine (NORVASC) 10 MG tablet Take 1 tablet (10 mg total) by mouth once daily. (Patient taking differently: Take 10 mg by mouth every evening. )    aspirin (ECOTRIN) 81 MG EC tablet Take 1 tablet (81 mg total) by mouth once  daily.    doxazosin (CARDURA) 8 MG Tab Take 1 tablet (8 mg total) by mouth once daily.    ferrous gluconate (FERGON) 324 MG tablet TAKE 1 TABLET (324 MG TOTAL) BY MOUTH DAILY WITH BREAKFAST.    fluticasone propionate (FLONASE) 50 mcg/actuation nasal spray 2 sprays (100 mcg total) by Each Nostril route once daily.    isosorbide dinitrate (ISORDIL) 20 MG tablet Take 1 tablet (20 mg total) by mouth 3 (three) times daily. (Patient not taking: Reported on 2020)    methIMAzole (TAPAZOLE) 10 MG Tab 20 mg in the morning, 10 mg in the afternoon    pantoprazole (PROTONIX) 40 MG tablet TAKE 1 TABLET (40 MG TOTAL) BY MOUTH ONCE DAILY.    potassium chloride (K-TAB) 20 mEq Take 2 tablets (40 mEq total) by mouth once daily.    predniSONE (DELTASONE) 20 MG tablet Take 2 tablets (40 mg total) by mouth once daily.    sildenafiL (VIAGRA) 100 MG tablet Take 1 tablet (100 mg total) by mouth daily as needed for Erectile Dysfunction.    spironolactone (ALDACTONE) 25 MG tablet Take 1 tablet (25 mg total) by mouth once daily.    sulfamethoxazole-trimethoprim 800-160mg (BACTRIM DS) 800-160 mg Tab Take 1 tablet by mouth once daily. for 7 days    torsemide (DEMADEX) 20 MG Tab Take 2 tablets (40 mg total) by mouth once daily.    warfarin (COUMADIN) 5 MG tablet Take 5mg orally daily except 2.5mg orally on     zolpidem (AMBIEN) 10 mg Tab Take 1 tablet (10 mg total) by mouth nightly as needed.     Family History       Problem Relation (Age of Onset)    Cancer Sister (54)    Coronary artery disease Father    Diabetes Father    Heart disease Father    Hypertension Father    No Known Problems Mother, Brother          Tobacco Use    Smoking status: Former Smoker     Packs/day: 1.00     Years: 31.00     Pack years: 31.00     Types: Cigarettes     Last attempt to quit: 2018     Years since quittin.2    Smokeless tobacco: Never Used    Tobacco comment: pt is quiting on his own - pt stated not qualified for  program; 2/16 pt  quit on his own   Substance and Sexual Activity    Alcohol use: No     Alcohol/week: 0.0 standard drinks     Frequency: Never     Drinks per session: Patient refused     Binge frequency: Never     Comment: one fifth of gin or rum/week    Drug use: No    Sexual activity: Yes     Partners: Female     Birth control/protection: None     Comment: 10/5/17  with same partner 7 years      Review of Systems   Constitution: Positive for malaise/fatigue. Negative for chills and decreased appetite.   HENT: Negative for congestion.    Cardiovascular: Negative for chest pain, irregular heartbeat and leg swelling.   Respiratory: Positive for shortness of breath. Negative for cough.    Endocrine: Negative for cold intolerance and heat intolerance.   Skin: Negative for rash.   Musculoskeletal: Negative for arthritis and back pain.   Gastrointestinal: Negative for abdominal pain, constipation and diarrhea.   Genitourinary: Negative for dysuria and hematuria.   Neurological: Negative for dizziness and headaches.   Psychiatric/Behavioral: Negative for altered mental status.     Objective:     Vital Signs (Most Recent):  Temp: 98.4 °F (36.9 °C) (04/27/20 2000)  Pulse: 76 (04/27/20 2300)  Resp: 20 (04/27/20 2000)  BP: 103/71 (04/27/20 2320)  SpO2: 99 % (04/27/20 2000) Vital Signs (24h Range):  Temp:  [98.4 °F (36.9 °C)-99 °F (37.2 °C)] 98.4 °F (36.9 °C)  Pulse:  [76-84] 76  Resp:  [18-20] 20  SpO2:  [95 %-99 %] 99 %  BP: ()/(0-71) 103/71     Weight: 121.9 kg (268 lb 11.9 oz)  Body mass index is 35.46 kg/m².    SpO2: 99 %  O2 Device (Oxygen Therapy): room air    No intake or output data in the 24 hours ending 04/27/20 2350    Lines/Drains/Airways       Line                   VAD 03/08/18 1124 Left ventricular assist device HeartMate  days              Peripheral Intravenous Line                   Peripheral IV - Single Lumen 20 G Anterior;Left Forearm -- days                    Physical Exam    Constitutional: He is oriented to person, place, and time. He appears well-developed and well-nourished. No distress.   HENT:   Head: Normocephalic.   Left Ear: External ear normal.   Eyes: Pupils are equal, round, and reactive to light. Right eye exhibits no discharge. Left eye exhibits no discharge.   Neck: Normal range of motion. No thyromegaly present.   Cardiovascular: Normal rate and regular rhythm. Exam reveals no friction rub.   No murmur heard.  Pulmonary/Chest: Effort normal and breath sounds normal. No respiratory distress.   Abdominal: Soft. Bowel sounds are normal. He exhibits no distension. There is no tenderness.   Musculoskeletal: Normal range of motion. He exhibits no edema.   Neurological: He is alert and oriented to person, place, and time. No cranial nerve deficit.   Skin:        Skin scar for BiV ICD   VAD in place        Significant Labs:   CMP   Recent Labs   Lab 04/27/20  1800      K 4.4   CL 97   CO2 24   *   BUN 23*   CREATININE 1.9*   CALCIUM 9.7   PROT 7.2   ALBUMIN 3.6   BILITOT 0.5   ALKPHOS 96   AST 31   ALT 42   ANIONGAP 15   ESTGFRAFRICA 45.5*   EGFRNONAA 39.4*   , CBC   Recent Labs   Lab 04/27/20  1800   WBC 10.55   HGB 13.9*   HCT 45.9      , INR   Recent Labs   Lab 04/27/20 2027   INR 3.1*    and All pertinent lab results from the last 24 hours have been reviewed.    Review of Systems   Constitutional: Positive for malaise/fatigue. Negative for chills and decreased appetite.   HENT: Negative for congestion.    Respiratory: Positive for shortness of breath. Negative for cough.    Cardiovascular: Negative for chest pain and leg swelling.   Gastrointestinal: Negative for abdominal pain, constipation and diarrhea.   Endocrine: Negative for cold intolerance and heat intolerance.   Genitourinary: Negative for dysuria and hematuria.   Musculoskeletal: Negative for arthritis and back pain.   Skin: Negative for rash.   Neurological: Negative for dizziness and  headaches.       Physical Exam   Constitutional: He is oriented to person, place, and time. He appears well-developed and well-nourished. No distress.   HENT:   Head: Normocephalic.   Left Ear: External ear normal.   Eyes: Pupils are equal, round, and reactive to light. Right eye exhibits no discharge. Left eye exhibits no discharge.   Neck: Normal range of motion. No thyromegaly present.   Cardiovascular: Normal rate and regular rhythm. Exam reveals no friction rub.   No murmur heard.  Pulmonary/Chest: Effort normal and breath sounds normal. No respiratory distress.   Abdominal: Soft. Bowel sounds are normal. He exhibits no distension. There is no tenderness.   Musculoskeletal: Normal range of motion. He exhibits no edema.   Neurological: He is alert and oriented to person, place, and time. No cranial nerve deficit.   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 Fortunately he is minimally symptomatic and has a defibrillator to protect against additional arrhythmias.  - Recently change in his Prednisone to 40 mg PO daily   - Continue methimazole 10 mg PO BID     Hypertension  - Continue amlodipine and doxazosin     LVAD (left ventricular assist device) present  Procedure: Device Interrogation Including analysis of device parameters  Current Settings: Ventricular Assist Device  Resume anticoagulation by tomorrow   Review of device function is stable/unstable stable    TXP LVAD INTERROGATIONS 4/27/2020 4/27/2020 4/27/2020 4/27/2020 3/13/2020 3/13/2020 3/12/2020   Type HeartMate3 HeartMate3 HeartMate II - HeartMate II HeartMate II HeartMate II   Flow 5.5 5.4 5.0 -  5.4 6.6 6.1   Speed 9800 9800 9800 - 9800 9800 9800   PI 4.3 4.4 4.8 - 4. 2.9 3.4   Power (Jackson) 6.5 6.4 6.2 - 6.4 7.1 6.8   LSL 9400 9400 9400 - 9400 9400 9400   Pulsatility - - Intermittent pulse Pulse Intermittent pulse No Pulse No Pulse       CKD (chronic kidney disease), stage III  - Baseline sCr ~ 1.7-1.9 and current sCr is 1.9  - Avoid NSAIDs, contrast, nephrotoxins and monitor strict I/Os, daily weights    Biventricular implantable cardioverter-defibrillator in situ  - Stable and he denies ICD discharge       Raciel Beth MD  Heart Transplant  [facility]

## 2020-04-28 NOTE — NURSING TRANSFER
Nursing Transfer Note      4/28/2020     Transfer From: CT    Transfer via stretcher    Transfer with cardiac monitoring    Transported by [employer] tech    Medicines sent: no    Chart send with patient: Yes

## 2020-04-28 NOTE — SUBJECTIVE & OBJECTIVE
Interval HPI:   Overnight events: Doing well, LDH improves after IVF     PMH, PSH, FH, SH updated and reviewed     ROS:    Review of Systems   Constitutional: Negative for activity change and unexpected weight change.   HENT: Negative for sore throat and voice change.    Eyes: Negative for visual disturbance.   Respiratory: Negative for shortness of breath.    Cardiovascular: Negative for chest pain.   Gastrointestinal: Negative for abdominal pain, constipation, diarrhea, nausea and vomiting.   Genitourinary: Negative for urgency.   Musculoskeletal: Negative for arthralgias.   Skin: Negative for wound.   Neurological: Negative for headaches.   Psychiatric/Behavioral: Negative for confusion and sleep disturbance.       Labs Reviewed and Include:  BASELINE Creatinine:   Recent Labs   Lab 04/28/20  0442   WBC 9.20   RBC 4.61   HGB 12.5*   HCT 40.8      MCV 89   MCH 27.1   MCHC 30.6*       Recent Labs   Lab 04/27/20  1800 04/28/20  0442   * 82   CALCIUM 9.7 9.2   ALBUMIN 3.6  --    PROT 7.2  --     133*   K 4.4 3.6   CO2 24 26   CL 97 97   BUN 23* 22*   CREATININE 1.9* 1.5*   ALKPHOS 96  --    ALT 42  --    AST 31  --    BILITOT 0.5  --      Lab Results   Component Value Date    HGBA1C 5.5 03/08/2018       Nutritional status:   Body mass index is 35.89 kg/m².  Lab Results   Component Value Date    ALBUMIN 3.6 04/27/2020    ALBUMIN 3.6 04/23/2020    ALBUMIN 3.6 03/23/2020     Lab Results   Component Value Date    PREALBUMIN 35 04/27/2020    PREALBUMIN 39 04/23/2020    PREALBUMIN 37 03/23/2020       Estimated Creatinine Clearance: 78.4 mL/min (A) (based on SCr of 1.5 mg/dL (H)).        PHYSICAL EXAMINATION:  Vitals:    04/28/20 1549   BP:    Pulse: 85   Resp: 18   Temp: 97.1 °F (36.2 °C)     Body mass index is 35.89 kg/m².    Physical Exam Physical Exam was not done due to COVID concern/minimizing risk of exposure

## 2020-04-28 NOTE — PLAN OF CARE
Pt free of falls and injury during shift. POC reviewed with pt VS stable and AAox4. 500cc NS admin. CT w contrast ordered. LVAD #'s WNL, dressing CDI.  No acute events noted at this time. No complaints. Educated pt why they are at risk for falls and to use call light for assistance. Yellow non-slip socks on pt. Bed low and locked, call light with in reach. Will continue to monitor.

## 2020-04-28 NOTE — ASSESSMENT & PLAN NOTE
Procedure: Device Interrogation Including analysis of device parameters  Current Settings: Ventricular Assist Device  Resume anticoagulation by tomorrow   Review of device function is stable/unstable stable    TXP LVAD INTERROGATIONS 4/27/2020 4/27/2020 4/27/2020 4/27/2020 3/13/2020 3/13/2020 3/12/2020   Type HeartMate3 HeartMate3 HeartMate II - HeartMate II HeartMate II HeartMate II   Flow 5.5 5.4 5.0 - 5.4 6.6 6.1   Speed 9800 9800 9800 - 9800 9800 9800   PI 4.3 4.4 4.8 - 4. 2.9 3.4   Power (Jackson) 6.5 6.4 6.2 - 6.4 7.1 6.8   LSL 9400 9400 9400 - 9400 9400 9400   Pulsatility - - Intermittent pulse Pulse Intermittent pulse No Pulse No Pulse

## 2020-04-28 NOTE — NURSING TRANSFER
Nursing Transfer Note      4/28/2020     Transfer To: CT    Transfer via stretcher    Transfer with cardiac monitoring    Transported by transporter tech    Medicines sent: no    Chart send with patient: Yes    LVAD emergency equipment with pt

## 2020-04-28 NOTE — CONSULTS
"  [facility]  Adult Nutrition  Consult Note    SUMMARY     Recommendations  1. Continue cardiac diet as tolerated.   2. If PO intake decreases < 50%, add Optisource TID.   3. RD to monitor.    Goals: Adequate PO intake to meet % of EEN and EPN by RD follow up  Nutrition Goal Status: new  Communication of RD Recs: other (comment)(POC)    Reason for Assessment    Reason For Assessment: consult  Diagnosis: (elevated LDH)  Relevant Medical History: NICM s/p LVAD 3/2018, CHF, CKD, HTN  Interdisciplinary Rounds: attended  General Information Comments: RD working remotely, unable to reach pt on multiple attempts. Per RN, pt with good intake of meals today. Large wt fluctuations noted per chart, likely fluid related. UBW appears 260#, recent 12# wt gain x 4 days per chart. NFPE to be completed at a later date if needed 2/2 COVID-19 precautions. Pt likely does not meet criteria for malnutrition at this time.  Nutrition Discharge Planning: Adequate PO intake on cardiac diet    Nutrition Risk Screen    Nutrition Risk Screen: no indicators present    Nutrition/Diet History         Anthropometrics    Temp: 96.9 °F (36.1 °C)  Height Method: Stated  Height: 6' 1" (185.4 cm)  Height (inches): 73 in  Weight Method: Standard Scale  Weight: 123.4 kg (272 lb)  Weight (lb): 272 lb  Ideal Body Weight (IBW), Male: 184 lb  % Ideal Body Weight, Male (lb): 147.83 %  BMI (Calculated): 35.9    Lab/Procedures/Meds    Pertinent Labs Reviewed: reviewed  Pertinent Labs Comments: Na 133, BUN 22, Cr 1.5, Alb 3.6, prealb 35, CRP 40.4  Pertinent Medications Reviewed: reviewed  Pertinent Medications Comments: amiodarone, ferrous gluconate, pantoprazole, prednisone, spironolactone, warfarin    Estimated/Assessed Needs    Weight Used For Calorie Calculations: 123.4 kg (272 lb 0.8 oz)  Energy Calorie Requirements (kcal): 2133 kcal/day  Energy Need Method: [place]-St Jeor(x 1.0 PAL 2/2 obesity)  Protein Requirements:  " g/day(0.8-1 g/kg)  Weight Used For Protein Calculations: 123.4 kg (272 lb 0.8 oz)  Fluid Requirements (mL): per MD or 1 mL/kcal     RDA Method (mL): 2133    Nutrition Prescription Ordered    Current Diet Order: Cardiac    Evaluation of Received Nutrient/Fluid Intake    I/O: +360mL since admit  Comments: LBM 4/27  % Intake of Estimated Energy Needs: 75 - 100 %  % Meal Intake: 75 - 100 %    Nutrition Risk    Level of Risk/Frequency of Follow-up: low     Assessment and Plan    No nutrition diagnosis at this time.     Monitor and Evaluation    Food and Nutrient Intake: energy intake, food and beverage intake  Food and Nutrient Adminstration: diet order  Anthropometric Measurements: weight, weight change, body mass index  Biochemical Data, Medical Tests and Procedures: electrolyte and renal panel, gastrointestinal profile, glucose/endocrine profile, inflammatory profile, lipid profile  Nutrition-Focused Physical Findings: overall appearance     Nutrition Follow-Up    RD Follow-up?: Yes

## 2020-04-28 NOTE — CONSULTS
[facility]  Endocrinology  Consult Note    Consult Requested by: Amparo Lopez MD   Reason for admit: Elevated LDH    HISTORY OF PRESENT ILLNESS:  Tim Richards is a 54 y.o. 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 That was [place] [facility] [place]DH concern for dehydration. Otherwise, he denies palpitations, chest pain, shortness of breath, fever, abd pain, N/V/D. Pt on Amiodarone since 2012, Currently on Amiodarone 400mg daily. Pt was Dr. Piedra in the past, started on prednisone and MMI in 12/2019. He report weight gain 20-30lb since that time. Last saw Dr Huerta, TSH: 5.4, FT4: 0.94, regiment was decrease to prednisone to 40 mg daily and methimazole to 20 mg in the morning, 10 mg in the evening. Of note, AIH been difficult to controlled as level would increase when weaning down steroid.  Last visit prednisone dose reduced as low as 20 mg daily however he required dose to be raised again. In the past has been issues with compliance and skip doses but he reports no missed doses recently. Pt was consulted with endocrinology for management of Amiodarone induced hyperthyroidism to taper PDN.         Medications and/or Treatments Impacting Glycemic Control:  Immunotherapy:    Immunosuppressants     None        Steroids:   Hormones (From admission, onward)    Start     Stop Route Frequency Ordered    04/28/20 0900  predniSONE tablet 40 mg      -- Oral Daily 04/27/20 1809        Pressors:    Autonomic Drugs (From admission, onward)    None          Medications Prior to Admission   Medication Sig Dispense Refill Last Dose    allopurinol (ZYLOPRIM) 100 MG tablet TAKE 1 TABLET BY MOUTH EVERY DAY 30 tablet 5 Taking    amiodarone (PACERONE) 400 MG tablet Take 1 tablet (400 mg total) by mouth once daily. 30 tablet 11 Taking    amLODIPine (NORVASC) 10 MG tablet Take 1 tablet (10 mg  total) by mouth once daily. (Patient taking differently: Take 10 mg by mouth every evening. ) 30 tablet 11 Taking    aspirin (ECOTRIN) 81 MG EC tablet Take 1 tablet (81 mg total) by mouth once daily. 100 tablet 3 Taking    doxazosin (CARDURA) 8 MG Tab Take 1 tablet (8 mg total) by mouth once daily. 30 tablet 11 Taking    ferrous gluconate (FERGON) 324 MG tablet TAKE 1 TABLET (324 MG TOTAL) BY MOUTH DAILY WITH BREAKFAST. 90 tablet 6 Taking    fluticasone propionate (FLONASE) 50 mcg/actuation nasal spray 2 sprays (100 mcg total) by Each Nostril route once daily. 16 g 3 Taking    isosorbide dinitrate (ISORDIL) 20 MG tablet Take 1 tablet (20 mg total) by mouth 3 (three) times daily. (Patient not taking: Reported on 4/24/2020) 90 tablet 11 Not Taking    methIMAzole (TAPAZOLE) 10 MG Tab 20 mg in the morning, 10 mg in the afternoon 120 tablet 2 Taking    pantoprazole (PROTONIX) 40 MG tablet TAKE 1 TABLET (40 MG TOTAL) BY MOUTH ONCE DAILY. 90 tablet 3 Taking    potassium chloride (K-TAB) 20 mEq Take 2 tablets (40 mEq total) by mouth once daily. 60 tablet 11 Taking    predniSONE (DELTASONE) 20 MG tablet Take 2 tablets (40 mg total) by mouth once daily. 90 tablet 2 Taking    sildenafiL (VIAGRA) 100 MG tablet Take 1 tablet (100 mg total) by mouth daily as needed for Erectile Dysfunction. 10 tablet 1 Taking    spironolactone (ALDACTONE) 25 MG tablet Take 1 tablet (25 mg total) by mouth once daily. 30 tablet 11 Taking    sulfamethoxazole-trimethoprim 800-160mg (BACTRIM DS) 800-160 mg Tab Take 1 tablet by mouth once daily. for 7 days 7 tablet 0     torsemide (DEMADEX) 20 MG Tab Take 2 tablets (40 mg total) by mouth once daily. 60 tablet 11 Taking    warfarin (COUMADIN) 5 MG tablet Take 5mg orally daily except 2.5mg orally on Wednesdays 30 tablet 11 Taking    zolpidem (AMBIEN) 10 mg Tab Take 1 tablet (10 mg total) by mouth nightly as needed. 30 tablet 5 Taking       Current Facility-Administered Medications    Medication Dose Route Frequency Provider Last Rate Last Dose    allopurinoL tablet 100 mg  100 mg Oral Daily Raciel Beth MD   100 mg at 04/28/20 0937    amiodarone tablet 400 mg  400 mg Oral Daily Raciel Beth MD   400 mg at 04/28/20 0936    amLODIPine tablet 10 mg  10 mg Oral Daily Raciel Beth MD   10 mg at 04/28/20 0455    aspirin EC tablet 81 mg  81 mg Oral Daily YANET Aguilar        doxazosin tablet 8 mg  8 mg Oral Q12H Raciel Beth MD   8 mg at 04/28/20 0455    ferrous gluconate tablet 324 mg  324 mg Oral Daily with breakfast Raciel Beth MD   324 mg at 04/28/20 0937    fluticasone propionate 50 mcg/actuation nasal spray 100 mcg  2 spray Each Nostril Daily Raciel Beth MD        methIMAzole tablet 10 mg  10 mg Oral Daily Raciel Beth MD   10 mg at 04/28/20 0937    methIMAzole tablet 10 mg  10 mg Oral Daily Raciel Beth MD   10 mg at 04/28/20 1741    pantoprazole EC tablet 40 mg  40 mg Oral Daily Raciel Beth MD   40 mg at 04/28/20 0936    predniSONE tablet 40 mg  40 mg Oral Daily Raciel Beth MD   40 mg at 04/28/20 0936    spironolactone tablet 25 mg  25 mg Oral Daily Racile Beth MD   25 mg at 04/28/20 0937    [START ON 5/3/2020] warfarin (COUMADIN) tablet 2.5 mg  2.5 mg Oral Every Sun Amparo Lopez MD        [START ON 4/29/2020] warfarin (COUMADIN) tablet 2.5 mg  2.5 mg Oral Every Wed Amparo Lopez MD        warfarin (COUMADIN) tablet 5 mg  5 mg Oral Every Tues, Thurs, Sat Amparo Lopez MD   5 mg at 04/28/20 1741    [START ON 5/1/2020] warfarin (COUMADIN) tablet 5 mg  5 mg Oral Every Mon, Fri Amparo Lopez MD        zolpidem tablet 10 mg  10 mg Oral Nightly PRN Raciel Beth MD   10 mg at 04/27/20 2095       Interval HPI:   Overnight events: Doing well, LDH improves after IVF     PMH, PSH, FH, SH updated and reviewed     ROS:    Review of Systems   Constitutional: Negative for activity change and unexpected weight  change.   HENT: Negative for sore throat and voice change.    Eyes: Negative for visual disturbance.   Respiratory: Negative for shortness of breath.    Cardiovascular: Negative for chest pain.   Gastrointestinal: Negative for abdominal pain, constipation, diarrhea, nausea and vomiting.   Genitourinary: Negative for urgency.   Musculoskeletal: Negative for arthralgias.   Skin: Negative for wound.   Neurological: Negative for headaches.   Psychiatric/Behavioral: Negative for confusion and sleep disturbance.       Labs Reviewed and Include:  BASELINE Creatinine:   Recent Labs   Lab 04/28/20  0442   WBC 9.20   RBC 4.61   HGB 12.5*   HCT 40.8      MCV 89   MCH 27.1   MCHC 30.6*       Recent Labs   Lab 04/27/20  1800 04/28/20  0442   * 82   CALCIUM 9.7 9.2   ALBUMIN 3.6  --    PROT 7.2  --     133*   K 4.4 3.6   CO2 24 26   CL 97 97   BUN 23* 22*   CREATININE 1.9* 1.5*   ALKPHOS 96  --    ALT 42  --    AST 31  --    BILITOT 0.5  --      Lab Results   Component Value Date    HGBA1C 5.5 03/08/2018       Nutritional status:   Body mass index is 35.89 kg/m².  Lab Results   Component Value Date    ALBUMIN 3.6 04/27/2020    ALBUMIN 3.6 04/23/2020    ALBUMIN 3.6 03/23/2020     Lab Results   Component Value Date    PREALBUMIN 35 04/27/2020    PREALBUMIN 39 04/23/2020    PREALBUMIN 37 03/23/2020       Estimated Creatinine Clearance: 78.4 mL/min (A) (based on SCr of 1.5 mg/dL (H)).        PHYSICAL EXAMINATION:  Vitals:    04/28/20 1549   BP:    Pulse: 85   Resp: 18   Temp: 97.1 °F (36.2 °C)     Body mass index is 35.89 kg/m².    Physical Exam Physical Exam was not done due to COVID concern/minimizing risk of exposure     .     ASSESSMENT and PLAN:    * Elevated LDH  - improving with IV hydration  - plan per HTS      Amiodarone-induced hyperthyroidism  - Chronic long standing issue, Pt on Amiodarone since 2012, Currently on Amiodarone 400mg daily  - TRAb (-), responsive to steroid, most likely Amiodarone induced  hypothyroidism type 2  - Thyroid US from 1/13/20 Showing Thyromegaly and Normal vascularity which is suggestive of Type 2 Amiodarone induced hyperthyroidism  - Clinically Euthyroid, Been on prednisone since 12/2019  - AIH been difficult to controlled as level would increase when weaning down steroid    Plan  - While inpatient  - Agree with methimazole decrease to 10mg BID now  - Recommend decrease Prednisone to 30mg QD, starting 4/29   - Duration of taper will be difficult given hx of rebound hyperthyroidism    -*Tentatively will plan for further taper with decreasing from 30mg to 20mg to 10mg every 2 weeks and then 5 mg for 2 weeks   - Will taper methimazole as well, will consider 10mg QD on D/C   - Will schedule TSH and FT4 in 3 weeks in clinic setting to check levels  - Will give final recs near Discharge, please contact endocrine team         LVAD (left ventricular assist device) present  - management per primary team      CKD (chronic kidney disease), stage III  - CKD is stable, improved post hydration      NICM (nonischemic cardiomyopathy)  - management per HTS team  - LVAD pt          DISCHARGE NEEDS: will assess daily    Guido Crisostomo MD  Endocrinology  [facility]

## 2020-04-28 NOTE — PROGRESS NOTES
04/28/20 0445   Vital Signs   BP (!) 100/0   BP Method Doppler     Unable to obtain cuff pressure on patient. Dr. Beth notified; states to give 0900 dose of amlodipine and doxazosin now. No further orders given at this time. Will continue to monitor.

## 2020-04-28 NOTE — SUBJECTIVE & OBJECTIVE
Past Medical History:   Diagnosis Date    CHF (congestive heart failure)     Dilated cardiomyopathy 1/10/2018    Disorder of kidney and ureter     CKD    Gout     HTN (hypertension)     Ventricular tachycardia (paroxysmal)        Past Surgical History:   Procedure Laterality Date    CARDIAC CATHETERIZATION  Dec. 2012    CARDIAC DEFIBRILLATOR PLACEMENT Left     CRRT-D    COLONOSCOPY N/A 3/6/2018    Procedure: COLONOSCOPY;  Surgeon: Alonso Bone MD;  Location: [facility] ENDO (2ND FLR);  Service: Endoscopy;  Laterality: N/A;    COLONOSCOPY N/A 7/17/2019    Procedure: COLONOSCOPY;  Surgeon: Blane Valdez MD;  Location: [facility] ENDO (2ND FLR);  Service: Endoscopy;  Laterality: N/A;    COLONOSCOPY N/A 7/18/2019    Procedure: COLONOSCOPY;  Surgeon: Blane Valdez MD;  Location: [facility] ENDO (2ND FLR);  Service: Endoscopy;  Laterality: N/A;    ESOPHAGOGASTRODUODENOSCOPY N/A 7/17/2019    Procedure: EGD (ESOPHAGOGASTRODUODENOSCOPY);  Surgeon: Blane Valdez MD;  Location: [facility] ENDO (2ND FLR);  Service: Endoscopy;  Laterality: N/A;    ESOPHAGOGASTRODUODENOSCOPY N/A 7/18/2019    Procedure: EGD (ESOPHAGOGASTRODUODENOSCOPY);  Surgeon: Blane Valdez MD;  Location: [facility] ENDO (2ND FLR);  Service: Endoscopy;  Laterality: N/A;    NONINVASIVE CARDIAC ELECTROPHYSIOLOGY STUDY N/A 10/18/2019    Procedure: CARDIAC ELECTROPHYSIOLOGY STUDY, NONINVASIVE;  Surgeon: Raz Wagner MD;  Location: [facility] EP LAB;  Service: Cardiology;  Laterality: N/A;  VT, DFTs, MDT CRTD in situ, LVAD, anes, MB, 3098    REPLACEMENT OF IMPLANTABLE CARDIOVERTER-DEFIBRILLATOR (ICD) GENERATOR N/A 3/9/2020    Procedure: REPLACEMENT, ICD GENERATOR;  Surgeon: Harry Yun MD;  Location: [facility] EP LAB;  Service: Cardiology;  Laterality: N/A;  VT, ICD Gen Change and Lead Revision, MDT, MAC, DM,3 Prep    REVISION OF IMPLANTABLE CARDIOVERTER-DEFIBRILLATOR (ICD) ELECTRODE LEAD PLACEMENT N/A 3/9/2020    Procedure: REVISION, INSERTION, ELECTRODE LEAD, ICD;  Surgeon: Harry SAUER  MD Jama;  Location: [facility] EP LAB;  Service: Cardiology;  Laterality: N/A;  VT, ICD Gen Change and Lead Revision, MDT, MAC, DM,3 Prep    TREATMENT OF CARDIAC ARRHYTHMIA  10/18/2019    Procedure: Cardioversion or Defibrillation;  Surgeon: Raz Wagner MD;  Location: [facility] EP LAB;  Service: Cardiology;;       Review of patient's allergies indicates:   Allergen Reactions    Lisinopril Anaphylaxis    Hydralazine analogues      Chronic constipation, impotence, dizziness       No current facility-administered medications on file prior to encounter.      Current Outpatient Medications on File Prior to Encounter   Medication Sig    allopurinol (ZYLOPRIM) 100 MG tablet TAKE 1 TABLET BY MOUTH EVERY DAY    amiodarone (PACERONE) 400 MG tablet Take 1 tablet (400 mg total) by mouth once daily.    amLODIPine (NORVASC) 10 MG tablet Take 1 tablet (10 mg total) by mouth once daily. (Patient taking differently: Take 10 mg by mouth every evening. )    aspirin (ECOTRIN) 81 MG EC tablet Take 1 tablet (81 mg total) by mouth once daily.    doxazosin (CARDURA) 8 MG Tab Take 1 tablet (8 mg total) by mouth once daily.    ferrous gluconate (FERGON) 324 MG tablet TAKE 1 TABLET (324 MG TOTAL) BY MOUTH DAILY WITH BREAKFAST.    fluticasone propionate (FLONASE) 50 mcg/actuation nasal spray 2 sprays (100 mcg total) by Each Nostril route once daily.    isosorbide dinitrate (ISORDIL) 20 MG tablet Take 1 tablet (20 mg total) by mouth 3 (three) times daily. (Patient not taking: Reported on 4/24/2020)    methIMAzole (TAPAZOLE) 10 MG Tab 20 mg in the morning, 10 mg in the afternoon    pantoprazole (PROTONIX) 40 MG tablet TAKE 1 TABLET (40 MG TOTAL) BY MOUTH ONCE DAILY.    potassium chloride (K-TAB) 20 mEq Take 2 tablets (40 mEq total) by mouth once daily.    predniSONE (DELTASONE) 20 MG tablet Take 2 tablets (40 mg total) by mouth once daily.    sildenafiL (VIAGRA) 100 MG tablet Take 1 tablet (100 mg total) by mouth daily as needed  for Erectile Dysfunction.    spironolactone (ALDACTONE) 25 MG tablet Take 1 tablet (25 mg total) by mouth once daily.    sulfamethoxazole-trimethoprim 800-160mg (BACTRIM DS) 800-160 mg Tab Take 1 tablet by mouth once daily. for 7 days    torsemide (DEMADEX) 20 MG Tab Take 2 tablets (40 mg total) by mouth once daily.    warfarin (COUMADIN) 5 MG tablet Take 5mg orally daily except 2.5mg orally on     zolpidem (AMBIEN) 10 mg Tab Take 1 tablet (10 mg total) by mouth nightly as needed.     Family History     Problem Relation (Age of Onset)    Cancer Sister (54)    Coronary artery disease Father    Diabetes Father    Heart disease Father    Hypertension Father    No Known Problems Mother, Brother        Tobacco Use    Smoking status: Former Smoker     Packs/day: 1.00     Years: 31.00     Pack years: 31.00     Types: Cigarettes     Last attempt to quit: 2018     Years since quittin.2    Smokeless tobacco: Never Used    Tobacco comment: pt is quiting on his own - pt stated not qualified for program;  pt  quit on his own   Substance and Sexual Activity    Alcohol use: No     Alcohol/week: 0.0 standard drinks     Frequency: Never     Drinks per session: Patient refused     Binge frequency: Never     Comment: one fifth of gin or rum/week    Drug use: No    Sexual activity: Yes     Partners: Female     Birth control/protection: None     Comment: 10/5/17  with same partner 7 years      Review of Systems   Constitution: Positive for malaise/fatigue. Negative for chills and decreased appetite.   HENT: Negative for congestion.    Cardiovascular: Negative for chest pain, irregular heartbeat and leg swelling.   Respiratory: Positive for shortness of breath. Negative for cough.    Endocrine: Negative for cold intolerance and heat intolerance.   Skin: Negative for rash.   Musculoskeletal: Negative for arthritis and back pain.   Gastrointestinal: Negative for abdominal pain, constipation and  diarrhea.   Genitourinary: Negative for dysuria and hematuria.   Neurological: Negative for dizziness and headaches.   Psychiatric/Behavioral: Negative for altered mental status.     Objective:     Vital Signs (Most Recent):  Temp: 98.4 °F (36.9 °C) (04/27/20 2000)  Pulse: 76 (04/27/20 2300)  Resp: 20 (04/27/20 2000)  BP: 103/71 (04/27/20 2320)  SpO2: 99 % (04/27/20 2000) Vital Signs (24h Range):  Temp:  [98.4 °F (36.9 °C)-99 °F (37.2 °C)] 98.4 °F (36.9 °C)  Pulse:  [76-84] 76  Resp:  [18-20] 20  SpO2:  [95 %-99 %] 99 %  BP: ()/(0-71) 103/71     Weight: 121.9 kg (268 lb 11.9 oz)  Body mass index is 35.46 kg/m².    SpO2: 99 %  O2 Device (Oxygen Therapy): room air    No intake or output data in the 24 hours ending 04/27/20 2350    Lines/Drains/Airways     Line                 VAD 03/08/18 1124 Left ventricular assist device HeartMate  days          Peripheral Intravenous Line                 Peripheral IV - Single Lumen 20 G Anterior;Left Forearm -- days                Physical Exam   Constitutional: He is oriented to person, place, and time. He appears well-developed and well-nourished. No distress.   HENT:   Head: Normocephalic.   Left Ear: External ear normal.   Eyes: Pupils are equal, round, and reactive to light. Right eye exhibits no discharge. Left eye exhibits no discharge.   Neck: Normal range of motion. No thyromegaly present.   Cardiovascular: Normal rate and regular rhythm. Exam reveals no friction rub.   No murmur heard.  Pulmonary/Chest: Effort normal and breath sounds normal. No respiratory distress.   Abdominal: Soft. Bowel sounds are normal. He exhibits no distension. There is no tenderness.   Musculoskeletal: Normal range of motion. He exhibits no edema.   Neurological: He is alert and oriented to person, place, and time. No cranial nerve deficit.   Skin:        Skin scar for BiV ICD   VAD in place        Significant Labs:   CMP   Recent Labs   Lab 04/27/20  1800      K 4.4   CL 97    CO2 24   *   BUN 23*   CREATININE 1.9*   CALCIUM 9.7   PROT 7.2   ALBUMIN 3.6   BILITOT 0.5   ALKPHOS 96   AST 31   ALT 42   ANIONGAP 15   ESTGFRAFRICA 45.5*   EGFRNONAA 39.4*   , CBC   Recent Labs   Lab 04/27/20  1800   WBC 10.55   HGB 13.9*   HCT 45.9      , INR   Recent Labs   Lab 04/27/20 2027   INR 3.1*    and All pertinent lab results from the last 24 hours have been reviewed.    Review of Systems   Constitutional: Positive for malaise/fatigue. Negative for chills and decreased appetite.   HENT: Negative for congestion.    Respiratory: Positive for shortness of breath. Negative for cough.    Cardiovascular: Negative for chest pain and leg swelling.   Gastrointestinal: Negative for abdominal pain, constipation and diarrhea.   Endocrine: Negative for cold intolerance and heat intolerance.   Genitourinary: Negative for dysuria and hematuria.   Musculoskeletal: Negative for arthritis and back pain.   Skin: Negative for rash.   Neurological: Negative for dizziness and headaches.       Physical Exam   Constitutional: He is oriented to person, place, and time. He appears well-developed and well-nourished. No distress.   HENT:   Head: Normocephalic.   Left Ear: External ear normal.   Eyes: Pupils are equal, round, and reactive to light. Right eye exhibits no discharge. Left eye exhibits no discharge.   Neck: Normal range of motion. No thyromegaly present.   Cardiovascular: Normal rate and regular rhythm. Exam reveals no friction rub.   No murmur heard.  Pulmonary/Chest: Effort normal and breath sounds normal. No respiratory distress.   Abdominal: Soft. Bowel sounds are normal. He exhibits no distension. There is no tenderness.   Musculoskeletal: Normal range of motion. He exhibits no edema.   Neurological: He is alert and oriented to person, place, and time. No cranial nerve deficit.   Skin:        Skin scar for BiV ICD   VAD in place

## 2020-04-28 NOTE — PLAN OF CARE
Plan of care discussed with patient.  Patient ambulating independently, fall precautions in place. LVAD DP and numbers WNL, smooth LVAD hum. Patient has no complaints of pain. LDH down to 666 in AM labs. Discussed medications and care.  Patient has no questions at this time. Will continue to monitor.

## 2020-04-28 NOTE — ASSESSMENT & PLAN NOTE
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 Fortunately he is minimally symptomatic and has a defibrillator to protect against additional arrhythmias.  - Recently change in his Prednisone to 40 mg PO daily   - Continue methimazole 10 mg PO BID

## 2020-04-28 NOTE — ASSESSMENT & PLAN NOTE
- Concern for LVAD mal function or volume depletion state   - Will proceed with CT chest, abdomen w w/o contrast to rule out VAD malfunction   - NaCl 500 mL over 4 hours and repeat LDH

## 2020-04-28 NOTE — PT/OT/SLP PROGRESS
Physical Therapy      Patient Name:  Tim Richards   MRN:  [id number]    Pt is a 55 y/o male with LVAD in place, implanted 3/8/2018, who presents with elevated LDH. Pt seen at bedside this date, found sitting up in bed working on computer. Pt reports that he was (I) with ambulation, ADLs, and LVAD management PTA, including working and driving. Pt adamantly declined OOB activity at time of PT attempt and reports no acute PT needs. Educated provided on importance of OOB activity and daily ambulation throughout hospital admission. Pt instructed to contact medical team if therapy needs arise during hospital admission. Pt appearing inattentive throughout education but verbalized understanding. PT orders discontinued at this time. Please re-consult if situation changes.     Nani Wagner, PT, DPT   4/28/2020  [phone]

## 2020-04-29 ENCOUNTER — TELEPHONE (OUTPATIENT)
Dept: ENDOCRINOLOGY | Facility: HOSPITAL | Age: 55
End: 2020-04-29

## 2020-04-29 VITALS
HEART RATE: 71 BPM | OXYGEN SATURATION: 96 % | SYSTOLIC BLOOD PRESSURE: 88 MMHG | WEIGHT: 273.13 LBS | BODY MASS INDEX: 36.2 KG/M2 | HEIGHT: 73 IN | RESPIRATION RATE: 18 BRPM | TEMPERATURE: 99 F

## 2020-04-29 DIAGNOSIS — E05.80 AMIODARONE-INDUCED HYPERTHYROIDISM: Primary | ICD-10-CM

## 2020-04-29 DIAGNOSIS — T46.2X5A AMIODARONE-INDUCED HYPERTHYROIDISM: Primary | ICD-10-CM

## 2020-04-29 DIAGNOSIS — Z95.811 LVAD (LEFT VENTRICULAR ASSIST DEVICE) PRESENT: Primary | Chronic | ICD-10-CM

## 2020-04-29 LAB
ALBUMIN SERPL BCP-MCNC: 3.1 G/DL (ref 3.5–5.2)
ALP SERPL-CCNC: 83 U/L (ref 55–135)
ALT SERPL W/O P-5'-P-CCNC: 36 U/L (ref 10–44)
ANION GAP SERPL CALC-SCNC: 9 MMOL/L (ref 8–16)
APTT BLDCRRT: 33.2 SEC (ref 21–32)
AST SERPL-CCNC: 26 U/L (ref 10–40)
BASOPHILS # BLD AUTO: 0.03 K/UL (ref 0–0.2)
BASOPHILS NFR BLD: 0.4 % (ref 0–1.9)
BILIRUB DIRECT SERPL-MCNC: 0.3 MG/DL (ref 0.1–0.3)
BILIRUB SERPL-MCNC: 0.4 MG/DL (ref 0.1–1)
BNP SERPL-MCNC: 229 PG/ML (ref 0–99)
BUN SERPL-MCNC: 18 MG/DL (ref 6–20)
CALCIUM SERPL-MCNC: 9.2 MG/DL (ref 8.7–10.5)
CHLORIDE SERPL-SCNC: 101 MMOL/L (ref 95–110)
CO2 SERPL-SCNC: 27 MMOL/L (ref 23–29)
CREAT SERPL-MCNC: 1.5 MG/DL (ref 0.5–1.4)
CRP SERPL-MCNC: 38.8 MG/L (ref 0–8.2)
DIFFERENTIAL METHOD: ABNORMAL
EOSINOPHIL # BLD AUTO: 0 K/UL (ref 0–0.5)
EOSINOPHIL NFR BLD: 0.2 % (ref 0–8)
ERYTHROCYTE [DISTWIDTH] IN BLOOD BY AUTOMATED COUNT: 16.9 % (ref 11.5–14.5)
EST. GFR  (AFRICAN AMERICAN): >60 ML/MIN/1.73 M^2
EST. GFR  (NON AFRICAN AMERICAN): 52.4 ML/MIN/1.73 M^2
GLUCOSE SERPL-MCNC: 98 MG/DL (ref 70–110)
HCT VFR BLD AUTO: 42.1 % (ref 40–54)
HGB BLD-MCNC: 12.7 G/DL (ref 14–18)
IMM GRANULOCYTES # BLD AUTO: 0.32 K/UL (ref 0–0.04)
IMM GRANULOCYTES NFR BLD AUTO: 3.8 % (ref 0–0.5)
INR PPP: 2.2 (ref 0.8–1.2)
LDH SERPL L TO P-CCNC: 687 U/L (ref 110–260)
LYMPHOCYTES # BLD AUTO: 0.6 K/UL (ref 1–4.8)
LYMPHOCYTES NFR BLD: 7.6 % (ref 18–48)
MAGNESIUM SERPL-MCNC: 2.3 MG/DL (ref 1.6–2.6)
MCH RBC QN AUTO: 27 PG (ref 27–31)
MCHC RBC AUTO-ENTMCNC: 30.2 G/DL (ref 32–36)
MCV RBC AUTO: 89 FL (ref 82–98)
MONOCYTES # BLD AUTO: 0.7 K/UL (ref 0.3–1)
MONOCYTES NFR BLD: 8.3 % (ref 4–15)
NEUTROPHILS # BLD AUTO: 6.6 K/UL (ref 1.8–7.7)
NEUTROPHILS NFR BLD: 79.7 % (ref 38–73)
NRBC BLD-RTO: 0 /100 WBC
PHOSPHATE SERPL-MCNC: 3.1 MG/DL (ref 2.7–4.5)
PLATELET # BLD AUTO: 156 K/UL (ref 150–350)
PMV BLD AUTO: 10.9 FL (ref 9.2–12.9)
POTASSIUM SERPL-SCNC: 4.1 MMOL/L (ref 3.5–5.1)
PREALB SERPL-MCNC: 29 MG/DL (ref 20–43)
PROT SERPL-MCNC: 6.4 G/DL (ref 6–8.4)
PROTHROMBIN TIME: 20.9 SEC (ref 9–12.5)
RBC # BLD AUTO: 4.71 M/UL (ref 4.6–6.2)
SODIUM SERPL-SCNC: 137 MMOL/L (ref 136–145)
WBC # BLD AUTO: 8.33 K/UL (ref 3.9–12.7)

## 2020-04-29 PROCEDURE — 93750 PR INTERROGATE VENT ASSIST DEV, IN PERSON, W PHYSICIAN ANALYSIS: ICD-10-PCS | Mod: ,,, | Performed by: INTERNAL MEDICINE

## 2020-04-29 PROCEDURE — 83615 LACTATE (LD) (LDH) ENZYME: CPT

## 2020-04-29 PROCEDURE — 80048 BASIC METABOLIC PNL TOTAL CA: CPT

## 2020-04-29 PROCEDURE — 99238 PR HOSPITAL DISCHARGE DAY,<30 MIN: ICD-10-PCS | Mod: ,,, | Performed by: INTERNAL MEDICINE

## 2020-04-29 PROCEDURE — 85730 THROMBOPLASTIN TIME PARTIAL: CPT

## 2020-04-29 PROCEDURE — 80076 HEPATIC FUNCTION PANEL: CPT

## 2020-04-29 PROCEDURE — 83735 ASSAY OF MAGNESIUM: CPT

## 2020-04-29 PROCEDURE — 93750 INTERROGATION VAD IN PERSON: CPT | Mod: ,,, | Performed by: INTERNAL MEDICINE

## 2020-04-29 PROCEDURE — 27000248 HC VAD-ADDITIONAL DAY

## 2020-04-29 PROCEDURE — 86140 C-REACTIVE PROTEIN: CPT

## 2020-04-29 PROCEDURE — 83880 ASSAY OF NATRIURETIC PEPTIDE: CPT

## 2020-04-29 PROCEDURE — 84100 ASSAY OF PHOSPHORUS: CPT

## 2020-04-29 PROCEDURE — 25000003 PHARM REV CODE 250: Performed by: PHYSICIAN ASSISTANT

## 2020-04-29 PROCEDURE — 85025 COMPLETE CBC W/AUTO DIFF WBC: CPT

## 2020-04-29 PROCEDURE — 85610 PROTHROMBIN TIME: CPT

## 2020-04-29 PROCEDURE — 99238 HOSP IP/OBS DSCHRG MGMT 30/<: CPT | Mod: ,,, | Performed by: INTERNAL MEDICINE

## 2020-04-29 PROCEDURE — 99231 SBSQ HOSP IP/OBS SF/LOW 25: CPT | Mod: ,,, | Performed by: INTERNAL MEDICINE

## 2020-04-29 PROCEDURE — 99231 PR SUBSEQUENT HOSPITAL CARE,LEVL I: ICD-10-PCS | Mod: ,,, | Performed by: INTERNAL MEDICINE

## 2020-04-29 PROCEDURE — 36415 COLL VENOUS BLD VENIPUNCTURE: CPT

## 2020-04-29 PROCEDURE — 25000003 PHARM REV CODE 250: Performed by: STUDENT IN AN ORGANIZED HEALTH CARE EDUCATION/TRAINING PROGRAM

## 2020-04-29 PROCEDURE — 63600175 PHARM REV CODE 636 W HCPCS: Performed by: HOSPITALIST

## 2020-04-29 PROCEDURE — 84134 ASSAY OF PREALBUMIN: CPT

## 2020-04-29 RX ORDER — FERROUS GLUCONATE 324(37.5)
324 TABLET ORAL
Qty: 30 TABLET | Refills: 11 | COMMUNITY
Start: 2020-04-30 | End: 2020-09-02 | Stop reason: SDUPTHER

## 2020-04-29 RX ORDER — METHIMAZOLE 10 MG/1
10 TABLET ORAL 2 TIMES DAILY
Qty: 120 TABLET | Refills: 2 | Status: SHIPPED | OUTPATIENT
Start: 2020-04-29 | End: 2020-05-21

## 2020-04-29 RX ORDER — WARFARIN SODIUM 5 MG/1
5 TABLET ORAL DAILY
Status: DISCONTINUED | OUTPATIENT
Start: 2020-04-29 | End: 2020-04-29 | Stop reason: HOSPADM

## 2020-04-29 RX ORDER — WARFARIN SODIUM 5 MG/1
TABLET ORAL
Qty: 30 TABLET | Refills: 11 | Status: ON HOLD | OUTPATIENT
Start: 2020-04-29 | End: 2020-07-13 | Stop reason: SDUPTHER

## 2020-04-29 RX ORDER — PREDNISONE 10 MG/1
30 TABLET ORAL DAILY
Qty: 90 TABLET | Refills: 11 | Status: SHIPPED | OUTPATIENT
Start: 2020-04-30 | End: 2020-05-21

## 2020-04-29 RX ORDER — DOXAZOSIN 8 MG/1
8 TABLET ORAL EVERY 12 HOURS
Qty: 60 TABLET | Refills: 11 | Status: SHIPPED | OUTPATIENT
Start: 2020-04-29 | End: 2021-03-25 | Stop reason: SDUPTHER

## 2020-04-29 RX ADMIN — ASPIRIN 81 MG: 81 TABLET, COATED ORAL at 09:04

## 2020-04-29 RX ADMIN — AMIODARONE HYDROCHLORIDE 400 MG: 200 TABLET ORAL at 09:04

## 2020-04-29 RX ADMIN — FERROUS GLUCONATE TAB 324 MG (37.5 MG ELEMENTAL IRON) 324 MG: 324 (37.5 FE) TAB at 09:04

## 2020-04-29 RX ADMIN — DOXAZOSIN 8 MG: 8 TABLET ORAL at 09:04

## 2020-04-29 RX ADMIN — PANTOPRAZOLE SODIUM 40 MG: 40 TABLET, DELAYED RELEASE ORAL at 09:04

## 2020-04-29 RX ADMIN — SPIRONOLACTONE 25 MG: 25 TABLET ORAL at 09:04

## 2020-04-29 RX ADMIN — PREDNISONE 30 MG: 10 TABLET ORAL at 09:04

## 2020-04-29 RX ADMIN — ALLOPURINOL 100 MG: 100 TABLET ORAL at 09:04

## 2020-04-29 RX ADMIN — AMLODIPINE BESYLATE 10 MG: 10 TABLET ORAL at 09:04

## 2020-04-29 RX ADMIN — METHIMAZOLE 10 MG: 10 TABLET ORAL at 09:04

## 2020-04-29 NOTE — PLAN OF CARE
Plan of care discussed with patient.  Patient ambulating independently, fall precautions in place. LVAD DP and numbers WNL, smooth LVAD hum. Patient has no complaints of pain. Discussed medications and care. Patient is planned to be discharged this PM.  Patient has no questions at this time. Will continue to monitor.

## 2020-04-29 NOTE — PLAN OF CARE
Patient is ready for discharge. Patient stable alert and oriented. IVs removed. No complaints of pain. Discussed discharge plan. Reviewed medications and side effects, appointments, and answered questions with patient and family. Patient to pickup prescriptions at [facility].

## 2020-04-29 NOTE — ASSESSMENT & PLAN NOTE
Procedure: Device Interrogation Including analysis of device parameters  Current Settings: Ventricular Assist Device  Resume anticoagulation by tomorrow   Review of device function is stable/unstable stable    TXP LVAD INTERROGATIONS 4/29/2020 4/29/2020 4/28/2020 4/28/2020 4/28/2020 4/28/2020 4/28/2020   Type HeartMate II HeartMate II HeartMate II HeartMate II HeartMate II HeartMate II HeartMate3   Flow 5.4 5.7 5.6 5.8 5.9 5.5 5.1   Speed 9800 9800 9800 9800 9800 9800 9800   PI 3.9 4.7 3.7 3.8 3.9 3.7 4.5   Power ([place]) 6.4 6.0 6.5 6.6 6.7 6.4 6.3   LSL - - - - - 9400 9400   Pulsatility - Intermittent pulse Intermittent pulse Intermittent pulse - - Intermittent pulse

## 2020-04-29 NOTE — PROGRESS NOTES
[facility]  Heart Transplant  Progress Note    Patient Name: Tim Richards  MRN: [id number]  Admission Date: 4/27/2020  Hospital Length of Stay: 2 days  Attending Physician: Amparo Lopez MD  Primary Care Provider: Power Connors MD  Principal Problem:Elevated LDH    Subjective:     Interval History:  today    Continuous Infusions:  Scheduled Meds:   allopurinoL  100 mg Oral Daily    amiodarone  400 mg Oral Daily    amLODIPine  10 mg Oral Daily    aspirin  81 mg Oral Daily    doxazosin  8 mg Oral Q12H    ferrous gluconate  324 mg Oral Daily with breakfast    fluticasone propionate  2 spray Each Nostril Daily    methIMAzole  10 mg Oral Daily    methIMAzole  10 mg Oral Daily    pantoprazole  40 mg Oral Daily    predniSONE  30 mg Oral Daily    spironolactone  25 mg Oral Daily    warfarin  5 mg Oral Daily     PRN Meds:zolpidem    Review of patient's allergies indicates:   Allergen Reactions    Lisinopril Anaphylaxis    Hydralazine analogues      Chronic constipation, impotence, dizziness     Objective:     Vital Signs (Most Recent):  Temp: 97.2 °F (36.2 °C) (04/29/20 1122)  Pulse: (!) 120 (04/29/20 1122)  Resp: 18 (04/29/20 1122)  BP: 121/66 (04/29/20 1122)  SpO2: (!) 93 % (04/29/20 0859) Vital Signs (24h Range):  Temp:  [97.1 °F (36.2 °C)-98.4 °F (36.9 °C)] 97.2 °F (36.2 °C)  Pulse:  [] 120  Resp:  [18] 18  SpO2:  [92 %-98 %] 93 %  BP: ()/(0-66) 121/66     Patient Vitals for the past 72 hrs (Last 3 readings):   Weight   04/29/20 0702 123.9 kg (273 lb 2.4 oz)   04/28/20 1056 123.4 kg (272 lb)   04/28/20 0412 123.4 kg (272 lb 0.8 oz)     Body mass index is 36.04 kg/m².      Intake/Output Summary (Last 24 hours) at 4/29/2020 1157  Last data filed at 4/29/2020 0500  Gross per 24 hour   Intake 480 ml   Output 1250 ml   Net -770 ml       Hemodynamic Parameters:           Physical Exam   Constitutional: He is oriented to person, place, and time. He appears  well-developed and well-nourished. No distress.   HENT:   Head: Normocephalic.   Left Ear: External ear normal.   Eyes: Pupils are equal, round, and reactive to light. Right eye exhibits no discharge. Left eye exhibits no discharge.   Neck: Normal range of motion. No thyromegaly present.   Cardiovascular: Normal rate and regular rhythm. Exam reveals no friction rub.   No murmur heard.  Pulmonary/Chest: Effort normal and breath sounds normal. No respiratory distress.   Abdominal: Soft. Bowel sounds are normal. He exhibits no distension. There is no tenderness.   Musculoskeletal: Normal range of motion. He exhibits no edema.   Neurological: He is alert and oriented to person, place, and time. No cranial nerve deficit.   Skin:        Skin scar for BiV ICD   VAD in place        Significant Labs:  CBC:  Recent Labs   Lab 04/27/20 1800 04/28/20 0442 04/29/20  0545   WBC 10.55 9.20 8.33   RBC 5.21 4.61 4.71   HGB 13.9* 12.5* 12.7*   HCT 45.9 40.8 42.1    156 156   MCV 88 89 89   MCH 26.7* 27.1 27.0   MCHC 30.3* 30.6* 30.2*     BNP:  Recent Labs   Lab 04/23/20  1205 04/27/20  1800 04/29/20  0544   * 241* 229*     CMP:  Recent Labs   Lab 04/23/20  1205 04/27/20  1800 04/28/20 0442 04/29/20  0544 04/29/20  0545   * 132* 82  --  98   CALCIUM 9.6 9.7 9.2  --  9.2   ALBUMIN 3.6 3.6  --  3.1*  --    PROT 7.2 7.2  --  6.4  --     136 133*  --  137   K 4.3 4.4 3.6  --  4.1   CO2 25 24 26  --  27   CL 98 97 97  --  101   BUN 22* 23* 22*  --  18   CREATININE 1.9* 1.9* 1.5*  --  1.5*   ALKPHOS 117 96  --  83  --    ALT 45* 42  --  36  --    AST 27 31  --  26  --    BILITOT 0.5 0.5  --  0.4  --       Coagulation:   Recent Labs   Lab 04/27/20  1800 04/27/20  2027 04/28/20  0442 04/29/20  0545   INR  --  3.1* 2.8* 2.2*   APTT 38.3*  --  35.1* 33.2*     LDH:  Recent Labs   Lab 04/27/20  1225 04/28/20  0442 04/29/20  0545   * 666* 687*     Microbiology:  Microbiology Results (last 7 days)     ** No  results found for the last 168 hours. **          I have reviewed all pertinent labs within the past 24 hours.    Estimated Creatinine Clearance: 78.5 mL/min (A) (based on SCr of 1.5 mg/dL (H)).    Diagnostic Results:  I have reviewed and interpreted all pertinent imaging results/findings within the past 24 hours.    Assessment and Plan:     Mr. Tim Richards, 54 y.o. male with PMHx significant for NICMP LVAD HM 2 with implant date: 3/8/2018; with recent Change in his RPM to 9800. He presented to clinic to repeat his LDH (new baseline LDH is in 500s). Repeated LDH showed 820. He reported vague symptoms of being fatigue and generally weak than baseline. He denies chest pain, LVAD alarm, reported complaint with medication. He reported change in his prednisone dose for amiodarone induced thyrotoxicosis based line endocrine recommendation. Repeated Labs showed elevated LDH and no alarm on VAD noted. Admitted for work of elevated LDH.     * Elevated LDH  - Concern for LVAD mal function or volume depletion state   - chest ct and echo reviewed, resumed aspirin. 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 Fortunately he is minimally symptomatic and has a defibrillator to protect against additional arrhythmias.  - Recently change in his Prednisone to 40 mg PO daily, endo consulted will decrease to 30 mg daily   - Continue methimazole 10 mg PO BID     Hypertension  - Continue amlodipine and doxazosin     LVAD (left ventricular assist device) present  Procedure: Device Interrogation Including analysis of device parameters  Current Settings: Ventricular Assist Device  Resume anticoagulation by tomorrow   Review of device function is stable/unstable stable    TXP LVAD INTERROGATIONS 4/29/2020 4/29/2020 4/28/2020 4/28/2020 4/28/2020 4/28/2020 4/28/2020   Type HeartMate II  HeartMate II HeartMate II HeartMate II HeartMate II HeartMate II HeartMate3   Flow 5.4 5.7 5.6 5.8 5.9 5.5 5.1   Speed 9800 9800 9800 9800 9800 9800 9800   PI 3.9 4.7 3.7 3.8 3.9 3.7 4.5   Power (Jackson) 6.4 6.0 6.5 6.6 6.7 6.4 6.3   LSL - - - - - 9400 9400   Pulsatility - Intermittent pulse Intermittent pulse Intermittent pulse - - Intermittent pulse       CKD (chronic kidney disease), stage III  - Baseline sCr ~ 1.7-1.9 and current sCr is 1.9  - Avoid NSAIDs, contrast, nephrotoxins and monitor strict I/Os, daily weights    Biventricular implantable cardioverter-defibrillator in situ  - Stable and he denies ICD discharge         YANET Harris  Heart Transplant  [facility]

## 2020-04-29 NOTE — PLAN OF CARE
[facility]   Heart Transplant/VAD Clinic   [address] [place] [address]   [phone] after hours          [phone] fax     VAD HOME  HEALTH ORDERS      Admit to Home Health    Diagnosis:   Patient Active Problem List   Diagnosis    Cigarette nicotine dependence without complication    Alcohol abuse    Pulmonary nodule seen on imaging study    Biventricular implantable cardioverter-defibrillator in situ    Chronic combined systolic and diastolic heart failure    High risk medications (amiodarone) long-term use    NICM (nonischemic cardiomyopathy)    CKD (chronic kidney disease), stage III    Hypertensive cardiovascular-renal disease, stage 1-4 or unspecified chronic kidney disease, with heart failure    LVAD (left ventricular assist device) present    Hyperglycemia    Hyperbilirubinemia    Anemia    Hypertension    Fever    Long term (current) use of anticoagulants    Left ventricular assist device (LVAD) complication    Pre-transplant evaluation for heart transplant    SOB (shortness of breath)    GIB (gastrointestinal bleeding)    Leukocytosis    Bloody diarrhea    Thrombocytopenia, unspecified    GI bleed    History of bleeding ulcers    Shortness of breath    VT (ventricular tachycardia)    Severe obesity (BMI 35.0-39.9) with comorbidity    Palpitations    Amiodarone-induced hyperthyroidism    Heart replaced by heart assist device    Presence of left ventricular assist device (LVAD)    Hyperthyroidism    PMT (pacemaker-mediated tachycardia)    Substance or medication-induced sleep disorder, insomnia type    VF (ventricular fibrillation)    AICD malfunction    Influenza A H1N1 infection    Elevated serum lactate dehydrogenase (LDH)    Uncontrolled hypertension    Elevated LDH       Patient is homebound due to:  NYHA Class IV HF. S/P LVAD placement.     Diet: Low Fat, Low cholesterol, 2Gm Na, Coumadin  restrictions.    Acitivities: No Swimming, bathing, vacuuming, contact sports.    Fresh implants= Sternal Precautions    Nursing:   SN to complete comprehensive assessment including routine vital signs. Instruct on disease process and s/s of complications to report to MD. Review/verify medication list sent home with the patient at time of discharge  and instruct patient/caregiver as needed. Frequency may be adjusted depending on start of care date.    **LVAD driveline exit site dressing change is to be completed per LVAD patient/caregiver only**.    Notify MD if:  SBP > 120 or < 80;   MAP > 80 or < 65;   HR > 120 or < 60;   Temp > 101;   Weight gain >3lbs in 1 day or 5lbs in 1 week.    LABS:  SN to perform labs: PT/INR per Coumadin clinic [phone].   Follow up INR date: May 4 2020  No Finger Sticks  LDH Monday May 4, 2020        Send initial Home Health orders to HTS attending physician on call.  Send follow up questions to VAD clinic MD [phone] or [phone].

## 2020-04-29 NOTE — PROGRESS NOTES
"04/29/2020  Ayesha Haas    Current provider:  Amparo Lopez MD      As floor rounding has been requested to be limited during COVID-19 patient quarantine by Infectious Disease and leadership, only "electronic" rounding has been performed on this mechanical assist device patient.  Electronic rounding includes verifying in Epic that current settings and measurements for the device have been documented appropriately and that they are within limits.  Additionally, this rounding verifies that the EQUIPMENT section of the VAD flowsheet is documented in Epic, specifically checking the presence of emergency equipment and controller self test.  Any discrepancies will be related to the care team.    In emergency, the nursing units have been notified to contact the perfusion department either by:  Calling [id number] from 630am to 4pm, or by contacting the hospital  from 4pm to 630am and asking to speak with the perfusionist on call.    Ayesha Haas  9:02 AM  "

## 2020-04-29 NOTE — PROGRESS NOTES
DISCHARGE NOTE:    Tim Richards is a 54 y.o. male s/p HM2 lvad from 3.18 admitted for evaluation of elevated LDH.     Past Medical History:   Diagnosis Date    CHF (congestive heart failure)     Dilated cardiomyopathy 1/10/2018    Disorder of kidney and ureter     CKD    Gout     HTN (hypertension)     Ventricular tachycardia (paroxysmal)        Hospital Course: During his hospital stay Mr. Richards was evaluated for elevated LDH with chest CT, TTE and received iv hydration. He was evaluated by endocrinology. Prednisone tapering was initiated with a decrease to 30mg daily and methimazole was decreased to 10mg orally twice daily for hyperthyroidism. He was restarted on aspirin 81mg orally daily.     Allergies:   Review of patient's allergies indicates:   Allergen Reactions    Lisinopril Anaphylaxis    Hydralazine analogues      Chronic constipation, impotence, dizziness       Pharmacy Interventions/Recommendations:  1) INR Goal: 2-3    2) Antiplatelet Agents: ASA 81mg orally daily     3) Heparin Bridging:  No bridge    4) Patient Counseling/Education:  Completed     5) INR Follow-Up/Discharge Needs:  Monday with coumadin clinic; warfarin dosing adjusted to 2.5mg orally daily on Mon/Frid and 5mg orally on other days. His INR today is 2.2.     See list of discharge medication for dosing instructions.     Tim Richards and his caregiver verbalized their understanding and had the opportunity to ask questions.      Discharge Medications:   Tim Richards   Home Medication Instructions TRE:[id number]    Printed on:04/29/20 9177   Medication Information                      allopurinol (ZYLOPRIM) 100 MG tablet  TAKE 1 TABLET BY MOUTH EVERY DAY             amiodarone (PACERONE) 400 MG tablet  Take 1 tablet (400 mg total) by mouth once daily.             amLODIPine (NORVASC) 10 MG tablet  Take 1 tablet (10 mg total) by mouth once daily.             aspirin (ECOTRIN) 81 MG EC tablet  Take 1 tablet (81 mg  total) by mouth once daily.             doxazosin (CARDURA) 8 MG Tab  Take 1 tablet (8 mg total) by mouth every 12 (twelve) hours.             ferrous gluconate 324 mg (37.5 mg iron) Tab tablet  Take 1 tablet (324 mg total) by mouth daily with breakfast.             fluticasone propionate (FLONASE) 50 mcg/actuation nasal spray  2 sprays (100 mcg total) by Each Nostril route once daily.             methIMAzole (TAPAZOLE) 10 MG Tab  Take 1 tablet (10 mg total) by mouth 2 (two) times daily.             pantoprazole (PROTONIX) 40 MG tablet  TAKE 1 TABLET (40 MG TOTAL) BY MOUTH ONCE DAILY.             predniSONE (DELTASONE) 10 MG tablet  Take 3 tablets (30 mg total) by mouth once daily.             sildenafiL (VIAGRA) 100 MG tablet  Take 1 tablet (100 mg total) by mouth daily as needed for Erectile Dysfunction.             spironolactone (ALDACTONE) 25 MG tablet  Take 1 tablet (25 mg total) by mouth once daily.             warfarin (COUMADIN) 5 MG tablet  Take 5mg orally daily except 2.5mg orally on Monday / Fridays             zolpidem (AMBIEN) 10 mg Tab  Take 1 tablet (10 mg total) by mouth nightly as needed.

## 2020-04-29 NOTE — TELEPHONE ENCOUNTER
Called patient on on 4/29/2020 3:11 PM to review recent result.   Pt is to be D/C today    Will taper to prednisone and methimazole   Decrease Prednisone to 30mg QD, starting 4/29  Decrease methimazole 10mg BID    Sent to pharmacy  Will get lab work 5/4, pending that will plan for further titration    (decreasing from 30mg to 20mg to 10mg every 2 weeks and then 5 mg for 2 weeks)    Lab work order TSH, FT4      Guido Crisostomo MD  Endocrinology Fellow  4/29/2020

## 2020-04-29 NOTE — PROGRESS NOTES
Admit Note/Discharge Note    spoke with pt via phone in order to assess need due to COVID-19 pandemic and limiting contact for patient safety.  Pt verbalized consent to discuss care over the phone.       Patient is a 54 y.o.  male, admitted for elevated LDH, amiodarone-induced hyperthyroidism, hypertension, LVAD, CKD and biventricular implantable cardioverter-defibrillator.   Pt received his LVAD on 3/8/18.       Patient admitted to [facility] on 4/27/2020 .  At this time, patient presents via phone as alert and oriented.  At this time, patients caregiver is not in attendance due [facility] COVID-19 visitor guidlines.    Pt reports there are no assessment changes since last admission on 3/12/19.    Household/Family Systems     Patient resides with patient's spouse and adult step son, at     [address]  [place] [address].      Support system includes spouse and adult step son.    Pt does not have dependents that are in need of being cared for.     Patients primary caregiver is self.  Pt's cell:  [phone]  Pt's work: [phone]    Emergency contact:   Kelly Richards (spouse) [phone]    During admission, patient's caregiver plans to stay at home.  Confirmed patient and patients caregivers do have access to reliable transportation.    Cognitive Status/Learning     Patient reports reading ability as college and states patient does not have difficulty with reading, writing, seeing, hearing, comprehension, learning and memory.    Patient reports patient learns best by reading.   Needed: no.   Highest education level: college    Vocation/Disability   .  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  Pt is currently working from home due to COVID-19. Pt is not sure when he will return to work.    Adherence     Patient reports a medium level of adherence to health care regiment. Pt reports he continues to have difficulty  following his diet.    Adherence counseling and education provided. Patient verbalizes understanding.    Substance Use    Patient reports the following substance usage.    Tobacco: no current use. Pt quit smoking 2019  Alcohol: none. No alcohol since 2018  Illicit Drugs/Non-prescribed Medications: none.  Patient states clear understanding of the potential impact of substance use.  Substance abstinence/cessation counseling, education and resources provided and reviewed.     Services Utilizing/ADLS    Infusion Service: Prior to admission, patient utilizing? no  Home Health: Prior to admission, patient utilizing? No.  Pt plans on going to [facility] for out pt labs.  DME: Prior to admission, yes, cane-does not currently use  Pulmonary/Cardiac Rehab: Prior to admission, no  Dialysis:  Prior to admission, no  Transplant Specialty Pharmacy:  Prior to admission, no    Prior to admission, patient reports patient was independent with ADLS and was driving. Pt's spouse also drives.    Patient reports patient is able to care for himself at this time .  Patient indicates a willingness to care for self once medically cleared to do so.    Insurance/Medications    Insured by   Payor/Plan Subscr  Sex Relation Sub. Ins. ID Effective Group Num   1. [employer] - AET[employer]* JESUS PHELPS* 1965 Male  [id number]                                   [address]   2. [employer] - S[employer]* JESUS PHELPS* 1965 Male  [id number]                                    [address]      Primary Insurance (for UNOS reporting): Private ins  Secondary Insurance (for UNOS reporting): none    Patient reports patient {is able to obtain and afford medications at this time and at time of discharge.    Living Will/Healthcare Power of     Patient states patient does not have a LW and/or HCPA.   provided education regarding LW and HCPA and the completion of  forms.    Coping/Mental Health    Patient reports he is coping adequately with hospital stay and reports he's happy about being discharged to home today.  Pt reports no discharge needs at this time.    Patient denies  mental health difficulties.   General support provided.     Discharge Planning    At time of discharge, patient plans to return to own home under the care of self and spouse.  Patients spouse will transport patient.  Per rounds today, expected discharge date is today. Patient and patients caregiver  verbalize understanding and are involved in treatment planning and discharge process.    Additional Concerns    Patient is being followed for needs, education, resources, information, emotional support, supportive counseling and for supportive and skilled discharge plan of care.   providing ongoing psychosocial support, education, resources and d/c planning as needed. SW remains available. Pt denies additional needs and/or concerns at this time. Patient verbalizes understanding and agreement with information reviewed, social work availability and how to access available resources as needed.

## 2020-04-29 NOTE — PLAN OF CARE
Educated patient on plan of care, safety while in hospital, medications, prn meds, how to use call light, and fall precautions. Patient stated understanding. No falls during shift. Vitals stable. Patient is conduction defect on telemetry. VAD alarms history and WNL for patient. VAD monitor at bedside, no emergent alarms at this time. Patient currently sitting up in bed on laptop. No complaints or distress at this time. Bed in lowest position, call light within reach and urinal within reach, and side rails up x2. Will continue to monitor.

## 2020-04-30 NOTE — PROGRESS NOTES
D/C - 4/29-/p HM2 lvad from 3.18 admitted for evaluation of elevated LDH.  During his hospital stay Mr. Richards was evaluated for elevated LDH with chest CT, TTE and received iv hydration. He was evaluated by endocrinology. Prednisone tapering was initiated with a decrease to 30mg daily and methimazole was decreased to 10mg orally twice daily for hyperthyroidism. He was restarted on aspirin 81mg orally daily. He remains NO BRIDGE.   Monday with coumadin clinic; warfarin dosing adjusted to 2.5mg orally daily on Mon/Frid and 5mg orally on other days. His INR today is 2.2.  Coumadin calendar updated per inpatient MAR.  He will continue to go to the [facility] lab in [place] for labs, those are already sheduled.  LVM for Mr Richards to confirm dose and INR check on Monday 5/4/20.

## 2020-05-01 NOTE — PROGRESS NOTES
Mr Maurice reports that he is taking his previous dose prior to admission - 5 mg QD x 2.5 Wed/Sun - I will update his calendar, he confirms that he will attend lab on Monday 5/4/20.

## 2020-05-02 NOTE — DISCHARGE SUMMARY
[facility]  Heart Transplant  Discharge Summary      Patient Name: Tim Richards  MRN: [id number]  Admission Date: 4/27/2020  Hospital Length of Stay: 2 days  Discharge Date and Time: 05/02/2020 9:40 AM  Attending Physician: No att. providers found   Discharging Provider: YANET Harris  Primary Care Provider: Power Connors MD     HPI: Mr. Tim Richards, 54 y.o. male with PMHx significant for NICMP LVAD HM 2 with implant date: 3/8/2018; with recent Change in his RPM to 9800. He presented to clinic to repeat his LDH (new baseline LDH is in 500s). Repeated LDH showed 820. He reported vague symptoms of being fatigue and generally weak than baseline. He denies chest pain, LVAD alarm, reported complaint with medication. He reported change in his prednisone dose for amiodarone induced thyrotoxicosis based line endocrine recommendation. Repeated Labs showed elevated LDH and no alarm on VAD noted. Admitted for work of elevated LDH.     * No surgery found *     Hospital Course: Patient was amditted to [facility]. He was hydrated with 500 cc NS and CT scan done. No kinking seen. His LDH decreased to 600s. The next day his speed was increased to 10,000 and aspirin 81 mg resumed and he wished to discharge. He will have labs next week and RTC in 2 weeks     Consults (From admission, onward)        Status Ordering Provider     Inpatient consult to Endocrinology  Once     Provider:  (Not yet assigned)    KLAUS Crum     Inpatient consult to Registered Dietitian/Nutritionist  Once     Provider:  (Not yet assigned)    Completed JOSE BYRD          Significant Diagnostic Studies: Labs: BMP: No results for input(s): GLU, NA, K, CL, CO2, BUN, CREATININE, CALCIUM, MG in the last 48 hours.    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] 11/08/2019 Yes     Chronic    VT (ventricular tachycardia) [I47.2] 10/12/2019 Yes     Chronic    Hypertension [I10] 03/27/2018 Yes     Chronic    LVAD (left ventricular assist device) present [Z95.811] 03/08/2018 Not Applicable     Chronic    CKD (chronic kidney disease), stage III [N18.3] 01/12/2018 Yes     Chronic    NICM (nonischemic cardiomyopathy) [I42.8] 01/10/2018 Yes     Chronic    Chronic combined systolic and diastolic heart failure [I50.42] 09/23/2015 Yes    Biventricular implantable cardioverter-defibrillator in situ [Z95.810] 11/20/2014 Yes     Chronic      Problems Resolved During this Admission:      Discharged Condition: good    Disposition: Home or Self Care    Follow Up:  2 weeks  Diet normal  Provisions none    Patient Instructions:   No discharge procedures on file.  Medications:  Reconciled Home Medications:      Medication List      CHANGE how you take these medications    amLODIPine 10 MG tablet  Commonly known as:  NORVASC  Take 1 tablet (10 mg total) by mouth once daily.  What changed:  when to take this     doxazosin 8 MG Tab  Commonly known as:  CARDURA  Take 1 tablet (8 mg total) by mouth every 12 (twelve) hours.  What changed:  when to take this     ferrous gluconate 324 mg (37.5 mg iron) Tab tablet  Take 1 tablet (324 mg total) by mouth daily with breakfast.  What changed:  medication strength     methIMAzole 10 MG Tab  Commonly known as:  TAPAZOLE  Take 1 tablet (10 mg total) by mouth 2 (two) times daily.  What changed:    · how much to take  · how to take this  · when to take this  · additional instructions     predniSONE 10 MG tablet  Commonly known as:  DELTASONE  Take 3 tablets (30 mg total) by mouth once daily.  What changed:    · medication strength  · how much to take     warfarin 5 MG tablet  Commonly known as:  COUMADIN  Take 5mg orally daily except 2.5mg orally on Monday / Fridays  What changed:  additional instructions        CONTINUE taking  these medications    allopurinoL 100 MG tablet  Commonly known as:  ZYLOPRIM  TAKE 1 TABLET BY MOUTH EVERY DAY     amiodarone 400 MG tablet  Commonly known as:  PACERONE  Take 1 tablet (400 mg total) by mouth once daily.     aspirin 81 MG EC tablet  Commonly known as:  ECOTRIN  Take 1 tablet (81 mg total) by mouth once daily.     fluticasone propionate 50 mcg/actuation nasal spray  Commonly known as:  FLONASE  2 sprays (100 mcg total) by Each Nostril route once daily.     pantoprazole 40 MG tablet  Commonly known as:  PROTONIX  TAKE 1 TABLET (40 MG TOTAL) BY MOUTH ONCE DAILY.     sildenafiL 100 MG tablet  Commonly known as:  VIAGRA  Take 1 tablet (100 mg total) by mouth daily as needed for Erectile Dysfunction.     spironolactone 25 MG tablet  Commonly known as:  ALDACTONE  Take 1 tablet (25 mg total) by mouth once daily.     zolpidem 10 mg Tab  Commonly known as:  AMBIEN  Take 1 tablet (10 mg total) by mouth nightly as needed.        STOP taking these medications    isosorbide dinitrate 20 MG tablet  Commonly known as:  ISORDIL     potassium chloride 20 mEq  Commonly known as:  K-TAB     sulfamethoxazole-trimethoprim 800-160mg 800-160 mg Tab  Commonly known as:  BACTRIM DS     torsemide 20 MG Tab  Commonly known as:  DEMADEX            YANET Harris  Heart Transplant  [facility]

## 2020-05-02 NOTE — HOSPITAL COURSE
Patient was amditted to [facility]. He was hydrated with 500 cc NS and CT scan done. No kinking seen. His LDH decreased to 600s. The next day his speed was increased to 10,000 and aspirin 81 mg resumed and he wished to discharge. He will have labs next week and RTC in 2 weeks

## 2020-05-04 ENCOUNTER — ANTI-COAG VISIT (OUTPATIENT)
Dept: CARDIOLOGY | Facility: CLINIC | Age: 55
End: 2020-05-04
Payer: COMMERCIAL

## 2020-05-04 DIAGNOSIS — Z79.01 LONG TERM (CURRENT) USE OF ANTICOAGULANTS: ICD-10-CM

## 2020-05-04 DIAGNOSIS — Z95.811 LVAD (LEFT VENTRICULAR ASSIST DEVICE) PRESENT: ICD-10-CM

## 2020-05-04 PROCEDURE — 93793 ANTICOAG MGMT PT WARFARIN: CPT | Mod: S$GLB,,,

## 2020-05-04 PROCEDURE — 93793 PR ANTICOAGULANT MGMT FOR PT TAKING WARFARIN: ICD-10-PCS | Mod: S$GLB,,,

## 2020-05-05 ENCOUNTER — TELEPHONE (OUTPATIENT)
Dept: ENDOCRINOLOGY | Facility: CLINIC | Age: 55
End: 2020-05-05

## 2020-05-05 ENCOUNTER — PATIENT MESSAGE (OUTPATIENT)
Dept: TRANSPLANT | Facility: CLINIC | Age: 55
End: 2020-05-05

## 2020-05-05 DIAGNOSIS — T46.2X5A AMIODARONE-INDUCED HYPERTHYROIDISM: Primary | ICD-10-CM

## 2020-05-05 DIAGNOSIS — E05.80 AMIODARONE-INDUCED HYPERTHYROIDISM: Primary | ICD-10-CM

## 2020-05-05 NOTE — TELEPHONE ENCOUNTER
TSH and FT4 reviewed, Discussed with Dr Piedra.    Will decrease to prednisone 20mg daily  Will decrease to methimazole 10mg in AM and 5 mg in PM    Repeat lab work TSH/FT4 around 3 weeks, add on to his lab work with cards    I called the wife and discussed the result and recommendations.  She is aware.    Guido Crisostomo MD

## 2020-05-12 ENCOUNTER — PATIENT MESSAGE (OUTPATIENT)
Dept: ENDOCRINOLOGY | Facility: CLINIC | Age: 55
End: 2020-05-12

## 2020-05-12 ENCOUNTER — TELEPHONE (OUTPATIENT)
Dept: TRANSPLANT | Facility: HOSPITAL | Age: 55
End: 2020-05-12

## 2020-05-12 ENCOUNTER — PATIENT MESSAGE (OUTPATIENT)
Dept: TRANSPLANT | Facility: CLINIC | Age: 55
End: 2020-05-12

## 2020-05-12 ENCOUNTER — ANTI-COAG VISIT (OUTPATIENT)
Dept: CARDIOLOGY | Facility: CLINIC | Age: 55
End: 2020-05-12
Payer: COMMERCIAL

## 2020-05-12 DIAGNOSIS — Z95.811 LVAD (LEFT VENTRICULAR ASSIST DEVICE) PRESENT: ICD-10-CM

## 2020-05-12 DIAGNOSIS — Z79.01 LONG TERM (CURRENT) USE OF ANTICOAGULANTS: ICD-10-CM

## 2020-05-12 PROCEDURE — 93793 PR ANTICOAGULANT MGMT FOR PT TAKING WARFARIN: ICD-10-PCS | Mod: S$GLB,,,

## 2020-05-12 PROCEDURE — 93793 ANTICOAG MGMT PT WARFARIN: CPT | Mod: S$GLB,,,

## 2020-05-12 NOTE — PROGRESS NOTES
Patient wife confirmed dose and reports patient has returned back to work but denies any major changes.

## 2020-05-12 NOTE — TELEPHONE ENCOUNTER
SW placed call to Pt ([phone]) per request of VAD coordinator, Wendy, in order to f/u on message sent earlier this date r/t mood changes. Pt reports that he has not spoken to anyone on [facility] team today and expressed belief that perhaps his spouse had sent the message. Pt denied needs at this time. SW advised VAD coordinator of the following. SW remains available.

## 2020-05-19 ENCOUNTER — PATIENT MESSAGE (OUTPATIENT)
Dept: TRANSPLANT | Facility: CLINIC | Age: 55
End: 2020-05-19

## 2020-05-21 ENCOUNTER — CLINICAL SUPPORT (OUTPATIENT)
Dept: TRANSPLANT | Facility: CLINIC | Age: 55
End: 2020-05-21
Payer: COMMERCIAL

## 2020-05-21 ENCOUNTER — ANTI-COAG VISIT (OUTPATIENT)
Dept: CARDIOLOGY | Facility: CLINIC | Age: 55
End: 2020-05-21
Payer: COMMERCIAL

## 2020-05-21 ENCOUNTER — OFFICE VISIT (OUTPATIENT)
Dept: TRANSPLANT | Facility: CLINIC | Age: 55
End: 2020-05-21
Payer: COMMERCIAL

## 2020-05-21 ENCOUNTER — TELEPHONE (OUTPATIENT)
Dept: ENDOCRINOLOGY | Facility: CLINIC | Age: 55
End: 2020-05-21

## 2020-05-21 VITALS — WEIGHT: 268 LBS | BODY MASS INDEX: 35.52 KG/M2 | HEIGHT: 73 IN | TEMPERATURE: 99 F | SYSTOLIC BLOOD PRESSURE: 80 MMHG

## 2020-05-21 DIAGNOSIS — I47.20 VT (VENTRICULAR TACHYCARDIA): Chronic | ICD-10-CM

## 2020-05-21 DIAGNOSIS — I42.8 NICM (NONISCHEMIC CARDIOMYOPATHY): ICD-10-CM

## 2020-05-21 DIAGNOSIS — Z95.810 BIVENTRICULAR IMPLANTABLE CARDIOVERTER-DEFIBRILLATOR IN SITU: Chronic | ICD-10-CM

## 2020-05-21 DIAGNOSIS — Z79.01 LONG TERM (CURRENT) USE OF ANTICOAGULANTS: ICD-10-CM

## 2020-05-21 DIAGNOSIS — Z95.811 LVAD (LEFT VENTRICULAR ASSIST DEVICE) PRESENT: Primary | Chronic | ICD-10-CM

## 2020-05-21 DIAGNOSIS — Z95.811 LVAD (LEFT VENTRICULAR ASSIST DEVICE) PRESENT: ICD-10-CM

## 2020-05-21 DIAGNOSIS — E05.80 AMIODARONE-INDUCED HYPERTHYROIDISM: Primary | ICD-10-CM

## 2020-05-21 DIAGNOSIS — N18.30 CKD (CHRONIC KIDNEY DISEASE), STAGE III: Chronic | ICD-10-CM

## 2020-05-21 DIAGNOSIS — D50.0 IRON DEFICIENCY ANEMIA DUE TO CHRONIC BLOOD LOSS: ICD-10-CM

## 2020-05-21 DIAGNOSIS — T46.2X5A AMIODARONE-INDUCED HYPERTHYROIDISM: Primary | ICD-10-CM

## 2020-05-21 DIAGNOSIS — F17.210 CIGARETTE NICOTINE DEPENDENCE WITHOUT COMPLICATION: ICD-10-CM

## 2020-05-21 DIAGNOSIS — I50.42 CHRONIC COMBINED SYSTOLIC AND DIASTOLIC HEART FAILURE: ICD-10-CM

## 2020-05-21 DIAGNOSIS — I10 ESSENTIAL HYPERTENSION: Chronic | ICD-10-CM

## 2020-05-21 DIAGNOSIS — Z79.899 HIGH RISK MEDICATIONS (NOT ANTICOAGULANTS) LONG-TERM USE: ICD-10-CM

## 2020-05-21 DIAGNOSIS — F19.982 SUBSTANCE OR MEDICATION-INDUCED SLEEP DISORDER, INSOMNIA TYPE: ICD-10-CM

## 2020-05-21 DIAGNOSIS — R06.02 SOB (SHORTNESS OF BREATH): ICD-10-CM

## 2020-05-21 DIAGNOSIS — E66.01 SEVERE OBESITY (BMI 35.0-39.9) WITH COMORBIDITY: ICD-10-CM

## 2020-05-21 DIAGNOSIS — R40.0 DAYTIME SOMNOLENCE: ICD-10-CM

## 2020-05-21 PROBLEM — R50.9 FEVER: Status: RESOLVED | Noted: 2018-03-27 | Resolved: 2020-05-21

## 2020-05-21 PROCEDURE — 99999 PR PBB SHADOW E&M-EST. PATIENT-LVL IV: CPT | Mod: PBBFAC,,, | Performed by: INTERNAL MEDICINE

## 2020-05-21 PROCEDURE — 99214 OFFICE O/P EST MOD 30 MIN: CPT | Mod: S$GLB,,, | Performed by: INTERNAL MEDICINE

## 2020-05-21 PROCEDURE — 99214 PR OFFICE/OUTPT VISIT, EST, LEVL IV, 30-39 MIN: ICD-10-PCS | Mod: S$GLB,,, | Performed by: INTERNAL MEDICINE

## 2020-05-21 PROCEDURE — 99999 PR PBB SHADOW E&M-EST. PATIENT-LVL IV: ICD-10-PCS | Mod: PBBFAC,,, | Performed by: INTERNAL MEDICINE

## 2020-05-21 RX ORDER — ZOLPIDEM TARTRATE 10 MG/1
10 TABLET ORAL NIGHTLY PRN
Qty: 30 TABLET | Refills: 5 | Status: CANCELLED | OUTPATIENT
Start: 2020-05-21 | End: 2020-06-20

## 2020-05-21 RX ORDER — PREDNISONE 10 MG/1
10 TABLET ORAL DAILY
Qty: 90 TABLET | Refills: 11
Start: 2020-05-21 | End: 2020-06-04

## 2020-05-21 NOTE — PROCEDURES
TXP [facility] INTERROGATIONS 5/21/2020 4/29/2020 4/28/2020 4/28/2020 4/28/2020 4/27/2020 4/27/2020   Type HeartMate II - - - - - HeartMate3   Flow 4.8 - - - - - 6.1   Speed 64231 - - - - - 9800   PI 2.9 - - - - - 4.1   Power (Jackson) 6.4 - - - - - 6.9   LSL 9600 - - - - - -   Pulsatility No Pulse Intermittent pulse Intermittent pulse Intermittent pulse Intermittent pulse Intermittent pulse Pulse   }Interrogation of Ventricular assist device was performed with physician analysis of device parameters and review of device function. I have personally reviewed the interrogation findings and agree with findings as stated.

## 2020-05-21 NOTE — TELEPHONE ENCOUNTER
I spoke with Ms. Mendoza (wife) who handles his medications. TSH increased from 8.8 to 13.5 over the past 2 weeks.  She said that Mr. Richards was going to talk with his cardiologist and request to change to an alternative from Amiodarone.  I think that is reasonable from a long-term standpoint.  However, because of the long half-life of amiodarone, I would not expect the thyroid issues to resolve immediately.    Plan:  1) Stop methimazole  2) Decrease prednisone to 10 mg daily - this will need to be weaned slowly over several weeks to allow adrenal recovery.  3) Recheck TSH in 1 week (scheduled on 5/28 for INR check).    Ms. Mendoza voiced understanding with the plan.       Ref. Range 4/23/2020 12:05 4/27/2020 18:00 5/4/2020 12:28 5/21/2020 12:21   TSH Latest Ref Range: 0.45 - 5.33 uIU/mL 5.753 (H) 5.782 (H) 8.86 (H) 13.57 (H)   T4 Total Latest Ref Range: 4.5 - 11.5 ug/dL 5.3      Free T4 Latest Ref Range: 0.61 - 1.12 ng/dL 0.91 0.82 0.85 0.93

## 2020-05-21 NOTE — PATIENT INSTRUCTIONS
Continue working on diet, exercise and weight loss-     Keep salt intake to under 2000 mg sodium, fluids to under 2 L (64 oz)    Call us if you find yourself getting more short of breath, have more swelling or unexpected weight changes, fever, chills, alarms, bloody or black bowel movements, or drainage from your driveline    Shoot for a goal weight of 250# to start, 1-2 # a week is a good pace for weight loss- you are already doing this, so keep up focusing on moving, portion control and smart food choices

## 2020-05-21 NOTE — LETTER
May 21, 2020        Nader Chiu  [address]  [address]  [place] [address]  Phone: [phone]  Fax: [phone]             [facility] [address] [place] [address]  Phone: [phone]   Patient: Tim Richards   MR Number: [id number]   YOB: 1965   Date of Visit: 5/21/2020       Dear Dr. Nader Chiu    Thank you for referring Tim Richards to me for evaluation. Attached you will find relevant portions of my assessment and plan of care.    If you have questions, please do not hesitate to call me. I look forward to following Tim Richards along with you.    Sincerely,    Amparo Lopez MD    Enclosure    If you would like to receive this communication electronically, please contact [email or web address] or [phone] to request [facility] [employer] access.    [facility] [employer] is a tool which provides read-only access to select patient information with whom you have a relationship. Its easy to use and provides real time access to review your patients record including encounter summaries, notes, results, and demographic information.    If you feel you have received this communication in error or would no longer like to receive these types of communications, please e-mail [email or web address]
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
## 2020-05-21 NOTE — PROGRESS NOTES
Subjective:   Patient ID:  Tim Richards is a 54 y.o. male who presents for LVAD followup visit.    Implant Date: 3/8/2018       Heartmate II RPM [id number]  3/9/18 outflow graft changed     INR goal: 2-3   NO Bridge with heparin    TXP SACHI INTERROGATIONS 5/21/2020   Type HeartMate II   Flow 4.8   Speed 89587   PI 2.9   Power (Jackson) 6.4   LSL 9600   Pulsatility No Pulse       HPI   Mr. Richards is a 53 yo AAM with DCM, HBP, CHF stage D s/p HM 2 comes for routine visit. He was recently discharged from our service after being treated for severe hyperthyroidism, having missed his prednisone for a few days and subsequently having multiple VT requiring ICD shocks, that did not capture. Found to have fractured lead, however due to extreme level of hyperthyroidism, felt safer to wait for his thyroid to level out before proceeding with lead replacement (this ultimately was performed 3/9/20).  Most recent admit was 4/27-5/2/20 for . He reported vague symptoms of being fatigue and generally weak than baseline. He denies chest pain, LVAD alarm, reported complaint with medication. He reported change in his prednisone dose for amiodarone induced thyrotoxicosis based line endocrine recommendation. Repeated Labs showed elevated LDH and no alarm on VAD noted.  He was hydrated with 500 cc NS and CT scan done. No kinking seen. His LDH decreased to 600s. The next day his speed was increased to 10,000 and aspirin 81 mg resumed and he wished to discharge. He will have labs next week and RTC in 2 weeks     Since then, pt has been doing well- has gone back to work, but keeps to himself in his office and enforces mask policy. hasnt been exercisng though he has an exercise bike- noticed that he fatigues easier and knows he needs to start doing more. Notes he just doesn't have the energy when he gets home, he's exhausted. Was dx'd with CHICHI a long time ago but never got treated as he was not insured at that time and didn't f/u. Has a  little swelling. Has also noticed his legs get numb when he stands too long in the kitchen, and that his pinkies go numb.  Interested in coming off amio if possible given the thyroid issues he has had. Has been SOB since Oct, but then has also gained a lot of wt since then- has been eating less though since his pred was reduced and is down 5# from last month    DLES is grade 1    Interrogation of device data reveals no alarms, normal flows and power (see VAD interrogation report for full details.)  ++ PI events      TTE 4/28/20  · Eccentric left ventricular hypertrophy. Severely decreased left ventricular systolic function. The estimated ejection fraction is 10%.  · Severe left atrial enlargement.  · Septal wall has abnormal motion.  · Grade I (mild) left ventricular diastolic dysfunction consistent with impaired relaxation.  · Mild right ventricular enlargement. Mildly to moderately reduced right ventricular systolic function.  · Mild-to-moderate mitral regurgitation.  · Elevated central venous pressure (15 mmHg).  · LVAD present. Base speed is 9800 RPMs. The pump type is a Heartmate II. The interventricular septum appears midline. Unable to comment on AV. Best that can be measured LV measures 7.2-7.5cm       Review of Systems   Constitution: Positive for malaise/fatigue and weight loss. Negative for chills, decreased appetite, diaphoresis, fever, night sweats and weight gain.   HENT: Negative for congestion.    Eyes: Negative.  Negative for blurred vision and visual disturbance.   Cardiovascular: Positive for dyspnea on exertion and leg swelling. Negative for chest pain, claudication, cyanosis, irregular heartbeat, near-syncope, orthopnea, palpitations, paroxysmal nocturnal dyspnea and syncope.   Respiratory: Negative for cough, hemoptysis, shortness of breath, sleep disturbances due to breathing, snoring and wheezing.    Endocrine: Negative.    Hematologic/Lymphatic: Negative.  Negative for bleeding problem. Does  "not bruise/bleed easily.   Skin: Negative for color change, dry skin, nail changes, poor wound healing and rash.   Musculoskeletal: Negative.  Negative for arthritis, joint pain and muscle weakness.   Gastrointestinal: Negative.  Negative for bloating, abdominal pain, anorexia, constipation, diarrhea, hematemesis, hematochezia, melena, nausea and vomiting.   Genitourinary: Negative.  Negative for frequency and urgency.   Neurological: Negative for difficulty with concentration, excessive daytime sleepiness, dizziness, headaches, light-headedness and weakness.   Psychiatric/Behavioral: Negative for depression. The patient does not have insomnia and is not nervous/anxious.        Objective:   Blood pressure (!) 80/0, temperature 98.5 °F (36.9 °C), temperature source Oral, height 6' 1" (1.854 m), weight 121.6 kg (268 lb).body mass index is 35.36 kg/m².  Doppler 80, no pulse    Physical Exam   Constitutional: He is oriented to person, place, and time. He appears well-developed and well-nourished. No distress.   HENT:   Head: Normocephalic and atraumatic.   Nose: Nose normal.   Mouth/Throat: Oropharynx is clear and moist. No oropharyngeal exudate.   Eyes: Pupils are equal, round, and reactive to light. Conjunctivae and EOM are normal. No scleral icterus.   Neck: Normal range of motion. Neck supple. No JVD present. Carotid bruit is not present. No tracheal deviation present. No thyromegaly present.   Cardiovascular: Normal rate, regular rhythm, S1 normal, S2 normal and normal heart sounds. Exam reveals no gallop and no friction rub.   No murmur heard.  Smooth VAD hum. DLES is "1"   Pulmonary/Chest: Effort normal and breath sounds normal. No respiratory distress. He has no wheezes. He has no rales. He exhibits no tenderness.   Abdominal: Soft. Bowel sounds are normal. He exhibits no distension and no mass. There is no tenderness. There is no rebound and no guarding.   Musculoskeletal: Normal range of motion. He exhibits no " edema or tenderness.   Neurological: He is alert and oriented to person, place, and time. Coordination normal.   Skin: Skin is warm and dry. No rash noted. He is not diaphoretic. No erythema. No pallor.   Psychiatric: He has a normal mood and affect. His behavior is normal. Judgment and thought content normal.   Nursing note and vitals reviewed.      Lab Results   Component Value Date    WBC 8.60 05/21/2020    HGB 13.5 (L) 05/21/2020    HCT 41.7 05/21/2020    MCV 85 05/21/2020     05/21/2020    CO2 27 05/21/2020    CREATININE 1.8 (H) 05/21/2020    CALCIUM 9.3 05/21/2020    ALBUMIN 4.5 05/21/2020    AST 28 05/21/2020     (H) 05/21/2020    ALT 30 05/21/2020     (H) 05/21/2020       Lab Results   Component Value Date    INR 2.1 (H) 05/21/2020    INR 1.9 (H) 05/12/2020    INR 2.0 (H) 05/04/2020       BNP   Date Value Ref Range Status   05/21/2020 109 (H) 0 - 99 pg/mL Final     Comment:     Values of less than 100 pg/ml are consistent with non-CHF populations.   05/12/2020 181 (H) 0 - 99 pg/mL Final     Comment:     Values of less than 100 pg/ml are consistent with non-CHF populations.   05/04/2020 176 (H) 0 - 99 pg/mL Final     Comment:     Values of less than 100 pg/ml are consistent with non-CHF populations.       LD   Date Value Ref Range Status   05/21/2020 511 (H) 84 - 195 U/L Final     Comment:     Results are increased in hemolyzed samples.   05/12/2020 546 (H) 84 - 195 U/L Final     Comment:     Results are increased in hemolyzed samples.   05/04/2020 643 (H) 84 - 195 U/L Final     Comment:     Results are increased in hemolyzed samples.       Assessment:      1. LVAD (left ventricular assist device) present    2. NICM (nonischemic cardiomyopathy)    3. CKD (chronic kidney disease), stage III    4. Biventricular implantable cardioverter-defibrillator in situ    5. Chronic combined systolic and diastolic heart failure    6. Cigarette nicotine dependence without complication    7. High risk  medications (amiodarone) long-term use    8. Long term (current) use of anticoagulants    9. SOB (shortness of breath)    10. VT (ventricular tachycardia)    11. Essential hypertension    12. Iron deficiency anemia due to chronic blood loss    13. Substance or medication-induced sleep disorder, insomnia type        Plan:   INR therapeutic, has little volume on exam with near normal BNP- LD remains elevated but trending down- BP controlled    Does c/o fatigue/daytime somnolence- will get sleep consult to retest him for CHICHI    Ordered and dispensed new batteries today (medically necessary)     Refer back to EP given VT and amio thyroid toxicity    Cont wt loss efforts     Listed for transplant: Not listed. Implanted as DT  Body mass index is 35.36 kg/m².  Has been smoke free now for over 6 mo      UNOS Patient Status  Functional Status: 90% - Able to carry on normal activity: minor symptoms of disease  Physical Capacity: No Limitations  Working for Income: yes  If yes, working activity level: Working Full Time       TTE next time to f/u on speed change, 1 mo with labs, visit and EP f/u to discuss transitioning off amio

## 2020-05-27 ENCOUNTER — PATIENT MESSAGE (OUTPATIENT)
Dept: ENDOCRINOLOGY | Facility: CLINIC | Age: 55
End: 2020-05-27

## 2020-05-29 ENCOUNTER — PATIENT MESSAGE (OUTPATIENT)
Dept: TRANSPLANT | Facility: CLINIC | Age: 55
End: 2020-05-29

## 2020-06-01 ENCOUNTER — PATIENT MESSAGE (OUTPATIENT)
Dept: TRANSPLANT | Facility: CLINIC | Age: 55
End: 2020-06-01

## 2020-06-01 DIAGNOSIS — F19.982 SUBSTANCE OR MEDICATION-INDUCED SLEEP DISORDER, INSOMNIA TYPE: ICD-10-CM

## 2020-06-01 DIAGNOSIS — T46.2X5A AMIODARONE-INDUCED HYPERTHYROIDISM: ICD-10-CM

## 2020-06-01 DIAGNOSIS — E79.0 HYPERURICEMIA: ICD-10-CM

## 2020-06-01 DIAGNOSIS — E05.80 AMIODARONE-INDUCED HYPERTHYROIDISM: ICD-10-CM

## 2020-06-01 RX ORDER — DOXAZOSIN 8 MG/1
8 TABLET ORAL EVERY 12 HOURS
Qty: 60 TABLET | Refills: 11 | Status: CANCELLED | OUTPATIENT
Start: 2020-06-01 | End: 2021-06-01

## 2020-06-01 RX ORDER — ALLOPURINOL 100 MG/1
100 TABLET ORAL DAILY
Qty: 30 TABLET | Refills: 5 | Status: CANCELLED | OUTPATIENT
Start: 2020-06-01

## 2020-06-01 RX ORDER — ZOLPIDEM TARTRATE 10 MG/1
10 TABLET ORAL NIGHTLY PRN
Qty: 30 TABLET | Refills: 5 | Status: CANCELLED | OUTPATIENT
Start: 2020-06-01 | End: 2020-07-01

## 2020-06-01 RX ORDER — SPIRONOLACTONE 25 MG/1
25 TABLET ORAL DAILY
Qty: 30 TABLET | Refills: 11 | Status: CANCELLED | OUTPATIENT
Start: 2020-06-01 | End: 2021-06-01

## 2020-06-01 RX ORDER — FERROUS GLUCONATE 324(37.5)
324 TABLET ORAL
Qty: 30 TABLET | Refills: 11 | Status: CANCELLED | COMMUNITY
Start: 2020-06-01 | End: 2021-06-01

## 2020-06-01 RX ORDER — PANTOPRAZOLE SODIUM 40 MG/1
40 TABLET, DELAYED RELEASE ORAL DAILY
Qty: 90 TABLET | Refills: 3 | Status: CANCELLED | OUTPATIENT
Start: 2020-06-01

## 2020-06-01 RX ORDER — AMLODIPINE BESYLATE 10 MG/1
10 TABLET ORAL DAILY
Qty: 30 TABLET | Refills: 11 | Status: CANCELLED | OUTPATIENT
Start: 2020-06-01 | End: 2021-06-01

## 2020-06-01 RX ORDER — PREDNISONE 10 MG/1
10 TABLET ORAL DAILY
Qty: 90 TABLET | Refills: 11 | Status: CANCELLED
Start: 2020-06-01

## 2020-06-01 RX ORDER — AMIODARONE HYDROCHLORIDE 400 MG/1
400 TABLET ORAL DAILY
Qty: 30 TABLET | Refills: 11 | Status: CANCELLED | OUTPATIENT
Start: 2020-06-01 | End: 2021-06-01

## 2020-06-02 NOTE — PROGRESS NOTES
After discussing equipment with patient, patient needs 14v X8.   Lot#(SEE BELOW) given to patient today in clinic and patient educated on proper use of item and maintenance. This EQUIPMENT is medically necessary for proper care and maintenance of LVAD system. VAD coordinator notified that item (isn't) under warranty.  (Billed)  Thoratec connect and VAD flow sheet updated today.    OLD:  Batteries expiration dates:  Serial Number           Expiration Date  1.[id number] 6/2020   2.[id number] 6/2020   3.[id number] 6/2020   4.[id number] 6/2020   5.[id number] 6/2020   6.[id number] 6/2020   7.[id number] 6/2020   8.[id number] 6/2020       NEW:  Batteries expiration dates:  Serial Number           Expiration Date  1.[id number] 2/2022   2.[id number] 2/2022   3.[id number] 2/2022   4.[id number] 2/2022   5.[id number] 2/2022   6.[id number] 2/2022   7.[id number] 2/2022   8.[id number] 2/2022

## 2020-06-03 ENCOUNTER — TELEPHONE (OUTPATIENT)
Dept: PULMONOLOGY | Facility: CLINIC | Age: 55
End: 2020-06-03

## 2020-06-03 ENCOUNTER — CLINICAL SUPPORT (OUTPATIENT)
Dept: TRANSPLANT | Facility: CLINIC | Age: 55
End: 2020-06-03
Payer: COMMERCIAL

## 2020-06-03 ENCOUNTER — ANTI-COAG VISIT (OUTPATIENT)
Dept: CARDIOLOGY | Facility: CLINIC | Age: 55
End: 2020-06-03
Payer: COMMERCIAL

## 2020-06-03 ENCOUNTER — OFFICE VISIT (OUTPATIENT)
Dept: TRANSPLANT | Facility: CLINIC | Age: 55
End: 2020-06-03
Attending: INTERNAL MEDICINE
Payer: COMMERCIAL

## 2020-06-03 ENCOUNTER — CLINICAL SUPPORT (OUTPATIENT)
Dept: CARDIOLOGY | Facility: HOSPITAL | Age: 55
End: 2020-06-03
Attending: INTERNAL MEDICINE
Payer: COMMERCIAL

## 2020-06-03 VITALS
HEART RATE: 80 BPM | WEIGHT: 272 LBS | DIASTOLIC BLOOD PRESSURE: 84 MMHG | SYSTOLIC BLOOD PRESSURE: 110 MMHG | TEMPERATURE: 98 F | BODY MASS INDEX: 36.05 KG/M2 | HEIGHT: 73 IN

## 2020-06-03 DIAGNOSIS — Z95.811 LVAD (LEFT VENTRICULAR ASSIST DEVICE) PRESENT: Primary | Chronic | ICD-10-CM

## 2020-06-03 DIAGNOSIS — I42.8 NICM (NONISCHEMIC CARDIOMYOPATHY): Chronic | ICD-10-CM

## 2020-06-03 DIAGNOSIS — F43.10 POST TRAUMATIC STRESS DISORDER (PTSD): ICD-10-CM

## 2020-06-03 DIAGNOSIS — I50.42 CHRONIC COMBINED SYSTOLIC AND DIASTOLIC HEART FAILURE: ICD-10-CM

## 2020-06-03 DIAGNOSIS — Z79.01 LONG TERM (CURRENT) USE OF ANTICOAGULANTS: ICD-10-CM

## 2020-06-03 DIAGNOSIS — E05.80 AMIODARONE-INDUCED HYPERTHYROIDISM: Chronic | ICD-10-CM

## 2020-06-03 DIAGNOSIS — I13.0 HYPERTENSIVE CARDIOVASCULAR-RENAL DISEASE, STAGE 1-4 OR UNSPECIFIED CHRONIC KIDNEY DISEASE, WITH HEART FAILURE: ICD-10-CM

## 2020-06-03 DIAGNOSIS — Z95.810 BIVENTRICULAR IMPLANTABLE CARDIOVERTER-DEFIBRILLATOR IN SITU: Chronic | ICD-10-CM

## 2020-06-03 DIAGNOSIS — I47.20 VT (VENTRICULAR TACHYCARDIA): ICD-10-CM

## 2020-06-03 DIAGNOSIS — E66.01 SEVERE OBESITY (BMI 35.0-39.9) WITH COMORBIDITY: ICD-10-CM

## 2020-06-03 DIAGNOSIS — T46.2X5A AMIODARONE-INDUCED HYPERTHYROIDISM: Chronic | ICD-10-CM

## 2020-06-03 DIAGNOSIS — N18.30 CKD (CHRONIC KIDNEY DISEASE), STAGE III: Chronic | ICD-10-CM

## 2020-06-03 DIAGNOSIS — Z95.810 AUTOMATIC IMPLANTABLE CARDIAC DEFIBRILLATOR IN SITU: ICD-10-CM

## 2020-06-03 DIAGNOSIS — Z79.899 HIGH RISK MEDICATIONS (NOT ANTICOAGULANTS) LONG-TERM USE: ICD-10-CM

## 2020-06-03 DIAGNOSIS — I47.20 VT (VENTRICULAR TACHYCARDIA): Chronic | ICD-10-CM

## 2020-06-03 DIAGNOSIS — I10 ESSENTIAL HYPERTENSION: ICD-10-CM

## 2020-06-03 DIAGNOSIS — Z95.811 LVAD (LEFT VENTRICULAR ASSIST DEVICE) PRESENT: ICD-10-CM

## 2020-06-03 PROCEDURE — 93282 CARDIAC DEVICE CHECK - IN CLINIC & HOSPITAL: ICD-10-PCS | Mod: 26,,, | Performed by: INTERNAL MEDICINE

## 2020-06-03 PROCEDURE — 93750 OP LVAD INTERROGATION: ICD-10-PCS | Mod: S$GLB,,, | Performed by: INTERNAL MEDICINE

## 2020-06-03 PROCEDURE — 99214 OFFICE O/P EST MOD 30 MIN: CPT | Mod: 25,S$GLB,, | Performed by: INTERNAL MEDICINE

## 2020-06-03 PROCEDURE — 93750 PR INTERROGATE VENT ASSIST DEV, IN PERSON, W PHYSICIAN ANALYSIS: ICD-10-PCS | Mod: S$GLB,,, | Performed by: INTERNAL MEDICINE

## 2020-06-03 PROCEDURE — 93282 PRGRMG EVAL IMPLANTABLE DFB: CPT | Mod: 26,,, | Performed by: INTERNAL MEDICINE

## 2020-06-03 PROCEDURE — 93282 PRGRMG EVAL IMPLANTABLE DFB: CPT

## 2020-06-03 PROCEDURE — 99999 PR PBB SHADOW E&M-EST. PATIENT-LVL III: ICD-10-PCS | Mod: PBBFAC,,, | Performed by: INTERNAL MEDICINE

## 2020-06-03 PROCEDURE — 99214 PR OFFICE/OUTPT VISIT, EST, LEVL IV, 30-39 MIN: ICD-10-PCS | Mod: 25,S$GLB,, | Performed by: INTERNAL MEDICINE

## 2020-06-03 PROCEDURE — 99999 PR PBB SHADOW E&M-EST. PATIENT-LVL I: ICD-10-PCS | Mod: PBBFAC,,,

## 2020-06-03 PROCEDURE — 93750 INTERROGATION VAD IN PERSON: CPT | Mod: S$GLB,,, | Performed by: INTERNAL MEDICINE

## 2020-06-03 PROCEDURE — 99999 PR PBB SHADOW E&M-EST. PATIENT-LVL III: CPT | Mod: PBBFAC,,, | Performed by: INTERNAL MEDICINE

## 2020-06-03 PROCEDURE — 99999 PR PBB SHADOW E&M-EST. PATIENT-LVL I: CPT | Mod: PBBFAC,,,

## 2020-06-03 RX ORDER — PAROXETINE 10 MG/1
10 TABLET, FILM COATED ORAL EVERY MORNING
Qty: 30 TABLET | Refills: 3 | Status: SHIPPED | OUTPATIENT
Start: 2020-06-03 | End: 2020-08-24

## 2020-06-03 RX ORDER — GUANFACINE 1 MG/1
1 TABLET ORAL NIGHTLY
Qty: 30 TABLET | Refills: 5 | Status: ON HOLD | OUTPATIENT
Start: 2020-06-03 | End: 2020-07-13 | Stop reason: HOSPADM

## 2020-06-03 NOTE — TELEPHONE ENCOUNTER
----- Message from Celia Manning sent at 6/3/2020 10:15 AM CDT -----  Contact: Tim tel:      [phone]   Dr. West is referring for virtual appt. And sleep study test to be done.   [facility] call to set this up this week.    Wants to set up the video at any time and day.   Has some questions.  pls call.

## 2020-06-03 NOTE — PROGRESS NOTES
Date of Implant with Heartmate II LVAD: 3/8/2018    PATIENT ARRIVED IN CLINIC:  Ambulatory   Accompanied by: self    Vitals  Temperature, oral:   Temp Readings from Last 1 Encounters:   20 98 °F (36.7 °C) (Oral)     Blood Pressure:   BP Readings from Last 3 Encounters:   20 (!) 98/0   20 (!) 80/0   20 (!) 88/0        VAD Interrogation:  TXP SACHI INTERROGATIONS 6/3/2020 2020 2020   Type HeartMate II HeartMate II -   Flow 5.9 4.8 -   Speed 29937 05403 -   PI 4.0 2.9 -   Power (Jackson) 6.9 6.4 -   LSL 9600 9600 -   Pulsatility No Pulse No Pulse Intermittent pulse       History Lo.log  Problems / Issues / Alarms with VAD if any: None noted  VAD Sounds: HM2 Smooth      HCT:   Lab Results   Component Value Date    HCT 41.9 2020    HCT 35 (L) 2020       Complaints/reason for visit today: routine  Emergency Equipment With Patient: yes   VAD Binder With Patient: no   Reviewed VAD Numbers In Binder: no    Any Equipment Issues: None noted (Refer to  note for complete details)    DLES Assessment:  Appearance Of Driveline: 1  Antibiotics: NO  Velour: no  Manual & Visual Inspection Of Driveline: No kinks or tears noted  Stabilization Device In Use: yes, sun securement device      Patient MyChart Questionnaire:   VAD QUESTIONNAIRE ANSWERS 2020   Have you had any LVAD related issues or alarms? No   Have you had any LVAD Equipment issues? No   How often do you do your LVAD dressing change? Twice per week   What type of dressing change do you do?  Biweekly kit   Do you need dressing change supplies? No   If yes, what kind (i.e. boxes/kits)? -   Do you need any new LVAD Accessories? (i.e Battery Holster Vest, Holster Vest, RT/LT Consolidation Bag) No   If yes, what kind of accessories do you need? -   Have you been using your Monthly Equipment Checklist? Yes   Description of Stools: Normal and formed without blood   How Often: Every other day   Color Of  Urine: Clear/Yellow   Are you experiencing: Coping OK?   How many hours per night are you sleeping? 7   Do you take any medications to help you fall asleep? Yes   If yes, what medications do you take to help you fall asleep?  ambien   Are you Showering? Yes   Do you change your dressing immediately after you dry off?  Yes   Are you exercising? Yes   If so, what do you do and for how long per day? work   How often are you exercising? 4 days a week   Are you working or what do you do to stay active? work   Are you driving? Yes   Do you know that if you have an alarm while driving you need to pull over first before looking at your alarm to avoid an accident? Yes   Are you in pain? Yes   If so, please rate: 4   What hurts? chest, abdomin, hands, fingers   Describe pain: tightness, palpitations, numbness, tingling   Do you take any medications for pain?  Yes   If yes, what kind? tylenol, acetominiphen, ambien   Does this relieve the pain? Yes   How often are you taking pain medicine? daily   What can we do for you? Lower my pump speed,  change amioderone to another medication that dosen't effect my Thyroid   Is your appointment today? No   Do you have your Emergency Bag with you? -   Do you have your VAD Binder with you in clinic today?  -         Labs:    Chemistry        Component Value Date/Time     06/03/2020 1222    K 4.0 06/03/2020 1222     (L) 06/03/2020 1222    CO2 26 06/03/2020 1222    BUN 22 (H) 06/03/2020 1222    CREATININE 1.7 (H) 06/03/2020 1222     06/03/2020 1222        Component Value Date/Time    CALCIUM 9.3 06/03/2020 1222    ALKPHOS 65 06/03/2020 1222    AST 28 06/03/2020 1222    ALT 30 06/03/2020 1222    BILITOT 0.9 06/03/2020 1222    ESTGFRAFRICA 52.1 (A) 06/03/2020 1222    EGFRNONAA 45.0 (A) 06/03/2020 1222            Magnesium   Date Value Ref Range Status   06/03/2020 2.1 1.6 - 2.6 mg/dL Final       Lab Results   Component Value Date    WBC 7.00 06/03/2020    HGB 13.5 (L)  06/03/2020    HCT 41.9 06/03/2020    MCV 85 06/03/2020     06/03/2020       Lab Results   Component Value Date    INR 1.5 (H) 06/03/2020    INR 2.1 (H) 05/21/2020    INR 1.9 (H) 05/12/2020       BNP   Date Value Ref Range Status   06/03/2020 175 (H) 0 - 99 pg/mL Final     Comment:     Values of less than 100 pg/ml are consistent with non-CHF populations.   05/21/2020 109 (H) 0 - 99 pg/mL Final     Comment:     Values of less than 100 pg/ml are consistent with non-CHF populations.   05/12/2020 181 (H) 0 - 99 pg/mL Final     Comment:     Values of less than 100 pg/ml are consistent with non-CHF populations.       LD   Date Value Ref Range Status   06/03/2020 447 (H) 84 - 195 U/L Final     Comment:     Results are increased in hemolyzed samples.   05/21/2020 511 (H) 84 - 195 U/L Final     Comment:     Results are increased in hemolyzed samples.   05/12/2020 546 (H) 84 - 195 U/L Final     Comment:     Results are increased in hemolyzed samples.       Labs reviewed with patient: YES INR 1.5. Endocrine following TSH/T4     Patient Satisfaction Survey completed per patient: No  (explained about signature and box to check)  Medication reconciliation: per MA.  Medication Detail updated today: yes  [facility]mad[facility]    Education: Reviewed driveline care, emergency procedures, how to change the controller, alarms with patient, as well as discussed how to page the VAD coordinator in case of an emergency.   Coved - 19 education: Reminded patient/caregiver to check temperature daily and call us if it is > 99.0.  Reminded them  to stay 6 feet away from other people, wash hands frequently, don't touch your face and stay home except yo get labs, medications, and appts.      Plans/Needs: Routine f/u. Doppler elevated at 98, was 80 at his last visit. Pt admits to nervousness due to feeling more heart fluttering than usual. Will get an ICD interrogation today. Pt also c/o numbness in hands and feet,  specifically his left hand pinky and ring finger- has been happening for the last 2 weeks. Occasional headaches- taking 200mg of Tylenol PRN. Echo scheduled for 6/5 to assess with speed of 91283pul. Pt also needs a f/u with EP.   POC:  Paxil for PTSD from ICD shocks  Guanfacine 1mg at night for HTN  RTC 1 mth.     Hurricane Season: Yes, discussed with patient: With hurricane season approaching, we want to make sure you are fully prepared for any emergency.  Should the National Weather Service or your local authorities recommend a voluntary or mandatory evacuation of your area, The VAD team requires you to evacuate to a safe place.  Remember, when it is a mandatory evacuation, traffic will become an issue for your limited battery power.  Therefore, we strongly urge you to evacuate early.    The VAD team advises you to have the following in place before hurricane season:  Have an evacuation plan in place including places to evacuate, names and phone numbers.  This information is required to be given to the VAD coordinator.   1. Have your VAD emergency contact numbers with you.   2. Make sure your prescriptions will not run out by the end of September.   3. Make sure you have enough medications, including pills, inhalers, patches, etc. to take, should you be gone for more than 2 weeks.   4. Make sure ALL of your batteries are fully charged.   5. Bring enough dressing change supplies to last for at least 2 weeks.   6. Bring your VAD binder with you.  Make sure your binder is updated and complete with alarms reference card, patient hand book, emergency contact numbers, daily log sheets, etc.  If you do not have family or friends as an evacuation destination, we recommend evacuating to a safe area.   Do NOT evacuate to [facility].    The VAD team wants to stress the importance of planning for your evacuation in the event of a hurricane.  If you have any LVAD questions or issues, please contact the LVAD  coordinator.

## 2020-06-03 NOTE — LETTER
June 4, 2020        Nader Chiu  [address]  [address]  [place] [address]  Phone: [phone]  Fax: [phone]             [facility] [address] [place] [address]  Phone: [phone]   Patient: Tim Richards   MR Number: [id number]   YOB: 1965   Date of Visit: 6/3/2020       Dear Dr. Nader Chiu    Thank you for referring Tim Richards to me for evaluation. Attached you will find relevant portions of my assessment and plan of care.    If you have questions, please do not hesitate to call me. I look forward to following Tim Richards along with you.    Sincerely,    Antonio Hadley Jr, MD    Enclosure    If you would like to receive this communication electronically, please contact [email or web address] or [phone] to request [employer] access.    [employer] is a tool which provides read-only access to select patient information with whom you have a relationship. Its easy to use and provides real time access to review your patients record including encounter summaries, notes, results, and demographic information.    If you feel you have received this communication in error or would no longer like to receive these types of communications, please e-mail [email or web address]

## 2020-06-04 ENCOUNTER — TELEPHONE (OUTPATIENT)
Dept: ENDOCRINOLOGY | Facility: CLINIC | Age: 55
End: 2020-06-04

## 2020-06-04 ENCOUNTER — DOCUMENTATION ONLY (OUTPATIENT)
Dept: CARDIOLOGY | Facility: HOSPITAL | Age: 55
End: 2020-06-04

## 2020-06-04 DIAGNOSIS — E05.80 AMIODARONE-INDUCED HYPERTHYROIDISM: Primary | ICD-10-CM

## 2020-06-04 DIAGNOSIS — T46.2X5A AMIODARONE-INDUCED HYPERTHYROIDISM: Primary | ICD-10-CM

## 2020-06-04 RX ORDER — PREDNISONE 2.5 MG/1
TABLET ORAL
Qty: 65 TABLET | Refills: 0 | Status: ON HOLD | OUTPATIENT
Start: 2020-06-04 | End: 2020-07-13 | Stop reason: HOSPADM

## 2020-06-04 NOTE — PROGRESS NOTES
"Subjective:   Patient ID:  Tim Richards is a 54 y.o. male who presents for LVAD followup visit.    Implant Date: 3/8/2018     Heartmate II RPM [id number]  3/9/18 outflow graft changed     INR goal: 2-3   NO Bridge with heparin  Antiplatelets: stopped on 7/2019 due to GIB.   ASA 81 mg restarted 4/23/2020    TXP [facility] INTERROGATIONS 6/3/2020   Type HeartMate II   Flow 5.9   Speed 76809   PI 4.0   Power (Jackson) 6.9   LSL 9600   Pulsatility No Pulse   Interrogation of Ventricular assist device was performed with physician analysis of device parameters and review of device function. I have personally reviewed the interrogation findings and agree with findings as stated.     HPI  55 yo BM with DCM, HBP, CHF stage D s/p HM 2 comes for routine visit. He was admitted January 2020 with severe hyperthyroidism, having missed his prednisone for a few days and subsequently having multiple VT episodes requiring ICD shocks, that did not capture. Found to have fractured lead, however due to extreme level of hyperthyroidism, felt safer to wait for his thyroid to level out before proceeding with lead replacement (this ultimately was performed 3/9/20).  He refused refusing admission 4/24/20 for uncontrolled BP and elevated LDH in clinic.  He returned to clinic 4/27/20 and at that time agreed to admission for further increase in .  He was hydrated with 500 cc NS and CT scan no kinking seen. His LDH decreased to 600s. The next day his speed was increased to 10,000 and aspirin 81 mg resumed and he was discharged.     He reports sensation of forceful, fluttering beats that "reminds me of what I felt with shocks" though ICD check today without VT.  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  No pre-syncope or syncope.  He has SOB/COLEMAN worse this year but no real change since last visit.  He feels anxious and describing PTSD type symptoms from shocks January " "2020.    Review of Systems   Constitution: Positive for malaise/fatigue and weight gain. Negative for chills, fever, night sweats and weight loss.   HENT: Negative for congestion, hearing loss, hoarse voice, nosebleeds and sore throat.    Eyes: Negative for double vision, pain, vision loss in left eye and vision loss in right eye.   Cardiovascular: Positive for dyspnea on exertion and palpitations. Negative for chest pain, claudication, irregular heartbeat, leg swelling, near-syncope, orthopnea, paroxysmal nocturnal dyspnea and syncope.   Respiratory: Negative for cough, hemoptysis, shortness of breath, sleep disturbances due to breathing, snoring, sputum production and wheezing.    Endocrine: Negative for cold intolerance, heat intolerance, polydipsia and polyuria.   Hematologic/Lymphatic: Negative for bleeding problem. Does not bruise/bleed easily.   Musculoskeletal: Negative for falls, muscle cramps, myalgias and neck pain.   Gastrointestinal: Negative for bloating, abdominal pain, change in bowel habit, constipation, diarrhea, hematochezia, jaundice, melena and nausea.   Genitourinary: Negative for bladder incontinence, dysuria, frequency, hematuria, hesitancy, incomplete emptying and urgency.   Neurological: Positive for numbness (left hand for few weeks). Negative for brief paralysis, dizziness, focal weakness, headaches, paresthesias, seizures and weakness.   Psychiatric/Behavioral: Negative for depression and memory loss. The patient is nervous/anxious (see HPI).      Objective:   Blood pressure 110/84 (MAP 93), doppler 88 (was higher at times in office see nursing VS) pulse 80, temperature 98 °F (36.7 °C), temperature source Oral, height 6' 1" (1.854 m), weight 123.4 kg (272 lb).body mass index is 35.89 kg/m².  Doppler: 88 mm Hg (lowest obtained)  Physical Exam   Constitutional: He appears well-developed and well-nourished. No distress.   Blood pressure 110/84 (MAP 93), doppler 88 (was higher at times in " "office see nursing VS) pulse 80, temperature 98 °F (36.7 °C), temperature source Oral, height 6' 1" (1.854 m), weight 123.4 kg (272 lb).body mass index is 35.89 kg/m².  Doppler: 88 mm Hg (lowest obtained)   HENT:   Head: Normocephalic and atraumatic.   Eyes: Conjunctivae are normal. No scleral icterus.   Neck: No JVD present. No tracheal deviation present. No thyromegaly present.   Cardiovascular:   Normal LVAD sounds; DL 1   Pulmonary/Chest: Effort normal and breath sounds normal. No respiratory distress. He has no wheezes. He has no rales.   Abdominal: Soft. Bowel sounds are normal. He exhibits no distension and no mass. There is no tenderness. There is no rebound and no guarding.   Musculoskeletal: He exhibits no edema or tenderness.   Neurological: He is alert.   Skin: Skin is warm and dry. He is not diaphoretic.   Psychiatric: He has a normal mood and affect. His behavior is normal. Judgment and thought content normal.     Lab Results   Component Value Date     (H) 06/03/2020     06/03/2020    K 4.0 06/03/2020    MG 2.1 06/03/2020     (L) 06/03/2020    CO2 26 06/03/2020    BUN 22 (H) 06/03/2020    CREATININE 1.7 (H) 06/03/2020     06/03/2020    HGBA1C 5.5 03/08/2018    AST 28 06/03/2020    ALT 30 06/03/2020    ALBUMIN 4.6 06/03/2020    PROT 7.8 06/03/2020    BILITOT 0.9 06/03/2020    WBC 7.00 06/03/2020    HGB 13.5 (L) 06/03/2020    HCT 41.9 06/03/2020    HCT 35 (L) 03/09/2020     06/03/2020    INR 1.5 (H) 06/03/2020    INR 2.1 07/31/2019     (H) 06/03/2020    TSH 12.98 (H) 06/03/2020    CHOL 259 (H) 04/23/2020     (H) 04/23/2020    LDLCALC 112.8 04/23/2020    TRIG 126 04/23/2020    T3WCTWG 5.3 04/23/2020    FREET4 0.90 06/03/2020     Assessment:      1. LVAD (left ventricular assist device) present    2. Chronic combined systolic and diastolic heart failure    3. Post traumatic stress disorder (PTSD)    4. Essential hypertension    5. Severe obesity (BMI " 35.0-39.9) with comorbidity    6. VT (ventricular tachycardia)    7. Long term (current) use of anticoagulants    8. Biventricular implantable cardioverter-defibrillator in situ    9. Amiodarone-induced hyperthyroidism    10. Hypertensive cardiovascular-renal disease, stage 1-4 or unspecified chronic kidney disease, with heart failure    11. High risk medications (amiodarone) long-term use    12. NICM (nonischemic cardiomyopathy)    13. CKD (chronic kidney disease), stage III        Plan:   Diuresis, wt loss, low sodium diet and fluid restriction reviewed along with daily weights and how to respond to 3# weight gain with prn diuretics.  If this fails to restore dry weight call us within 2-3 days.     Add guanfacine 1 mg q hs for BP    Add paxil 10 mg qd for PTSD and in 2-4 weeks if needed increase to 20 mg.  Warned to stop and go to ED immediately for any suicidal ideation, thoughts to harm self or others.    Patient is now NYHA II    Listed for transplant: No    UNOS Patient Status  Functional Status: 90% - Able to carry on normal activity: minor symptoms of disease  Physical Capacity: No Limitations  Working for Income: yes  If yes, working activity level: Working Full Time

## 2020-06-04 NOTE — PROGRESS NOTES
Pt presents to clinic for ICD assessment for arrhythmias.  See ICD report, normal device function.   See picture of healing incision, no redness, swelling, or drainage. Granulation noted. Pt denies fever or tenderness.  Pt will report clinic for s/s of infection.

## 2020-06-04 NOTE — TELEPHONE ENCOUNTER
Ref. Range 5/21/2020 12:21 5/21/2020 13:00 6/3/2020 10:30 6/3/2020 12:22   TSH Latest Ref Range: 0.45 - 5.33 uIU/mL 13.57 (H)   12.98 (H)   Free T4 Latest Ref Range: 0.61 - 1.12 ng/dL 0.93   0.90     Spoke with wife, Kelly who handles his meds. TSH remains elevated in the subclinical hypothyroid range. We will initiate the prednisone wean.  7.5 mg daily x 10 days then  5 mg daily x 10 days then  2.5 mg daily x 15 days then stop    I explained the symptoms of adrenal insufficiency to look out for to let me know if she experiences any increasing fatigue or appetite or any other new concerning symptoms.    We will recheck his TSH in two weeks.  She voiced understanding of the plan.    Please schedule labs on Friday, 6/19 at [facility] ([facility]). Thanks!

## 2020-06-04 NOTE — PROCEDURES
TXP [facility] INTERROGATIONS 6/3/2020 5/21/2020 4/29/2020 4/28/2020 4/28/2020 4/28/2020 4/27/2020   Type HeartMate II HeartMate II - - - - -   Flow 5.9 4.8 - - - - -   Speed 46279 44805 - - - - -   PI 4.0 2.9 - - - - -   Power (Jackson) 6.9 6.4 - - - - -   LSL 9600 9600 - - - - -   Pulsatility No Pulse No Pulse Intermittent pulse Intermittent pulse Intermittent pulse Intermittent pulse Intermittent pulse   }Interrogation of Ventricular assist device was performed with physician analysis of device parameters and review of device function. I have personally reviewed the interrogation findings and agree with findings as stated.

## 2020-06-05 ENCOUNTER — HOSPITAL ENCOUNTER (OUTPATIENT)
Dept: CARDIOLOGY | Facility: CLINIC | Age: 55
Discharge: HOME | End: 2020-06-05
Attending: INTERNAL MEDICINE
Payer: COMMERCIAL

## 2020-06-05 ENCOUNTER — OFFICE VISIT (OUTPATIENT)
Dept: PULMONOLOGY | Facility: CLINIC | Age: 55
End: 2020-06-05
Payer: COMMERCIAL

## 2020-06-05 VITALS — HEART RATE: 75 BPM | BODY MASS INDEX: 36.05 KG/M2 | WEIGHT: 272 LBS | HEIGHT: 73 IN

## 2020-06-05 DIAGNOSIS — G47.33 OSA (OBSTRUCTIVE SLEEP APNEA): Primary | ICD-10-CM

## 2020-06-05 LAB
ASCENDING AORTA: 3.76 CM
BSA FOR ECHO PROCEDURE: 2.52 M2
CV ECHO LV RWT: 0.24 CM
DOP CALC LVOT AREA: 5 CM2
DOP CALC LVOT DIAMETER: 2.53 CM
E WAVE DECELERATION TIME: 249.55 MSEC
E/A RATIO: 0.77
E/E' RATIO: 13.33 M/S
ECHO LV POSTERIOR WALL: 1.03 CM (ref 0.6–1.1)
FRACTIONAL SHORTENING: 0 % (ref 28–44)
INTERVENTRICULAR SEPTUM: 0.8 CM (ref 0.6–1.1)
LA MAJOR: 7.08 CM
LA MINOR: 6.81 CM
LA WIDTH: 5.11 CM
LEFT ATRIUM SIZE: 4.86 CM
LEFT ATRIUM VOLUME INDEX: 59.8 ML/M2
LEFT ATRIUM VOLUME: 146.55 CM3
LEFT INTERNAL DIMENSION IN SYSTOLE: 8.65 CM (ref 2.1–4)
LEFT VENTRICLE DIASTOLIC VOLUME INDEX: 168.41 ML/M2
LEFT VENTRICLE DIASTOLIC VOLUME: 412.98 ML
LEFT VENTRICLE MASS INDEX: 172 G/M2
LEFT VENTRICLE SYSTOLIC VOLUME INDEX: 167.3 ML/M2
LEFT VENTRICLE SYSTOLIC VOLUME: 410.37 ML
LEFT VENTRICULAR INTERNAL DIMENSION IN DIASTOLE: 8.68 CM (ref 3.5–6)
LEFT VENTRICULAR MASS: 422.39 G
LV LATERAL E/E' RATIO: 20 M/S
LV SEPTAL E/E' RATIO: 10 M/S
MV PEAK A VEL: 0.78 M/S
MV PEAK E VEL: 0.6 M/S
PISA TR MAX VEL: 1.91 M/S
RA MAJOR: 5.96 CM
RA PRESSURE: 8 MMHG
RA WIDTH: 4.43 CM
RIGHT VENTRICULAR END-DIASTOLIC DIMENSION: 5.44 CM
RV TISSUE DOPPLER FREE WALL SYSTOLIC VELOCITY 1 (APICAL 4 CHAMBER VIEW): 7.7 CM/S
SINUS: 3.37 CM
STJ: 3.23 CM
TDI LATERAL: 0.03 M/S
TDI SEPTAL: 0.06 M/S
TDI: 0.05 M/S
TR MAX PG: 15 MMHG
TRICUSPID ANNULAR PLANE SYSTOLIC EXCURSION: 1.73 CM
TV REST PULMONARY ARTERY PRESSURE: 23 MMHG

## 2020-06-05 PROCEDURE — 93306 ECHO (CUPID ONLY): ICD-10-PCS | Mod: S$GLB,,, | Performed by: INTERNAL MEDICINE

## 2020-06-05 PROCEDURE — 99203 PR OFFICE/OUTPT VISIT, NEW, LEVL III, 30-44 MIN: ICD-10-PCS | Mod: 95,,, | Performed by: NURSE PRACTITIONER

## 2020-06-05 PROCEDURE — 93306 TTE W/DOPPLER COMPLETE: CPT | Mod: S$GLB,,, | Performed by: INTERNAL MEDICINE

## 2020-06-05 PROCEDURE — 99203 OFFICE O/P NEW LOW 30 MIN: CPT | Mod: 95,,, | Performed by: NURSE PRACTITIONER

## 2020-06-05 NOTE — PROGRESS NOTES
"The patient location is: home  The chief complaint leading to consultation is: sleep apnea    Visit type: audiovisual    Face to Face time with patient:   20 minutes of total time spent on the encounter, which includes face to face time and non-face to face time preparing to see the patient (eg, review of tests), Obtaining and/or reviewing separately obtained history, Documenting clinical information in the electronic or other health record, Independently interpreting results (not separately reported) and communicating results to the patient/family/caregiver, or Care coordination (not separately reported).       Each patient to whom he or she provides medical services by telemedicine is:  (1) informed of the relationship between the physician and patient and the respective role of any other health care provider with respect to management of the patient; and (2) notified that he or she may decline to receive medical services by telemedicine and may withdraw from such care at any time.    Notes:   HISTORY OF PRESENT ILLNESS: Tim Richards is a 54 y.o. male with morbid obesity, CHF EF < 10% with LVAD is here for sleep evaluation. Patient with symptoms of  snoring, witnessed apnea, excessive daytime sleepiness, fatigue, difficulty staying asleep  "all his life" . Waking up gasping for air.         EPWORTH SLEEPINESS SCALE 6/5/2020   Sitting and reading 3   Watching TV 3   Sitting, inactive in a public place (e.g. a theatre or a meeting) 3   As a passenger in a car for an hour without a break 3   Lying down to rest in the afternoon when circumstances permit 3   Sitting and talking to someone 1   Sitting quietly after a lunch without alcohol 3   In a car, while stopped for a few minutes in traffic 1   Total score 20       SLEEP ROUTINE:  Bed partner:  Spouse  Time to bed:  1900  Sleep onset latency: 20-30      Disruptions or awakenings:   4-5 times  Wakeup time:      0500  Perceived sleep quality:  restless   Daytime " naps:     no plan naps  Caffeine use:  no caffeine  exercise habit:    Normal adl  Work computer acquisition 6-430 M-T    PAST MEDICAL HISTORY:    Active Ambulatory Problems     Diagnosis Date Noted    Cigarette nicotine dependence without complication 05/23/2014    Alcohol abuse 05/23/2014    Pulmonary nodule seen on imaging study 05/24/2014    Biventricular implantable cardioverter-defibrillator in situ 11/20/2014    Chronic combined systolic and diastolic heart failure 09/23/2015    High risk medications (amiodarone) long-term use 01/10/2018    NICM (nonischemic cardiomyopathy) 01/10/2018    CKD (chronic kidney disease), stage III 01/12/2018    Hypertensive cardiovascular-renal disease, stage 1-4 or unspecified chronic kidney disease, with heart failure     LVAD (left ventricular assist device) present 03/08/2018    Hyperglycemia 03/08/2018    Hyperbilirubinemia 03/12/2018    Anemia 03/12/2018    Hypertension 03/27/2018    Long term (current) use of anticoagulants 03/27/2018    Left ventricular assist device (LVAD) complication 06/15/2018    Pre-transplant evaluation for heart transplant 09/27/2018    SOB (shortness of breath) 07/16/2019    GIB (gastrointestinal bleeding) 07/16/2019    Leukocytosis 07/17/2019    Bloody diarrhea     Thrombocytopenia, unspecified 07/18/2019    GI bleed 07/19/2019    History of bleeding ulcers     Shortness of breath 10/12/2019    VT (ventricular tachycardia) 10/12/2019    Severe obesity (BMI 35.0-39.9) with comorbidity     Palpitations 10/28/2019    Amiodarone-induced hyperthyroidism 11/08/2019    Heart replaced by heart assist device     Presence of left ventricular assist device (LVAD) 01/12/2020    Hyperthyroidism 01/13/2020    Substance or medication-induced sleep disorder, insomnia type 02/07/2020    VF (ventricular fibrillation) 03/09/2020    Elevated serum lactate dehydrogenase (LDH) 04/23/2020    Essential hypertension 04/24/2020    CHICHI  (obstructive sleep apnea) 06/05/2020     Resolved Ambulatory Problems     Diagnosis Date Noted    Acute on chronic systolic congestive heart failure     Hypertension, well controlled     Acute pulmonary edema 09/22/2015    Respiratory distress 09/23/2015    Fluid overload 02/29/2016    Syncope 07/31/2017    Elevated troponin 12/22/2017    PVT (paroxysmal ventricular tachycardia) 01/10/2018    Acute decompensated heart failure 01/12/2018    Organ transplant candidate     Screening for colon cancer 03/05/2018    Palliative care encounter 03/07/2018    Goals of care, counseling/discussion     Cardiogenic shock 03/07/2018    Pneumothorax 03/12/2018    Fever 03/27/2018    ERRONEOUS ENCOUNTER--DISREGARD 04/24/2018    Smoker 07/18/2019    Defibrillator discharge     VF (ventricular fibrillation)     Ventricular tachycardia 01/11/2020    PMT (pacemaker-mediated tachycardia)     AICD malfunction     Influenza A H1N1 infection      Past Medical History:   Diagnosis Date    CHF (congestive heart failure)     Dilated cardiomyopathy 1/10/2018    Disorder of kidney and ureter     Gout     HTN (hypertension)     Ventricular tachycardia (paroxysmal)                 PAST SURGICAL HISTORY:    Past Surgical History:   Procedure Laterality Date    CARDIAC CATHETERIZATION  Dec. 2012    CARDIAC DEFIBRILLATOR PLACEMENT Left     CRRT-D    COLONOSCOPY N/A 3/6/2018    Procedure: COLONOSCOPY;  Surgeon: Alonso Bone MD;  Location: [address]);  Service: Endoscopy;  Laterality: N/A;    COLONOSCOPY N/A 7/17/2019    Procedure: COLONOSCOPY;  Surgeon: Blane Valdez MD;  Location: [address]);  Service: Endoscopy;  Laterality: N/A;    COLONOSCOPY N/A 7/18/2019    Procedure: COLONOSCOPY;  Surgeon: Blane Valdez MD;  Location: [address]);  Service: Endoscopy;  Laterality: N/A;    ESOPHAGOGASTRODUODENOSCOPY N/A 7/17/2019    Procedure: EGD (ESOPHAGOGASTRODUODENOSCOPY);  Surgeon: Blane Valdez MD;   Location: [facility] ENDO (2ND FLR);  Service: Endoscopy;  Laterality: N/A;    ESOPHAGOGASTRODUODENOSCOPY N/A 2019    Procedure: EGD (ESOPHAGOGASTRODUODENOSCOPY);  Surgeon: Blane Valdez MD;  Location: [facility] ENDO (2ND FLR);  Service: Endoscopy;  Laterality: N/A;    NONINVASIVE CARDIAC ELECTROPHYSIOLOGY STUDY N/A 10/18/2019    Procedure: CARDIAC ELECTROPHYSIOLOGY STUDY, NONINVASIVE;  Surgeon: Raz Wagner MD;  Location: [facility] EP LAB;  Service: Cardiology;  Laterality: N/A;  VT, DFTs, MDT CRTD in situ, LVAD, anes, MB, 3098    REPLACEMENT OF IMPLANTABLE CARDIOVERTER-DEFIBRILLATOR (ICD) GENERATOR N/A 3/9/2020    Procedure: REPLACEMENT, ICD GENERATOR;  Surgeon: Harry Yun MD;  Location: [facility] EP LAB;  Service: Cardiology;  Laterality: N/A;  VT, ICD Gen Change and Lead Revision, MDT, MAC, DM,3 Prep    REVISION OF IMPLANTABLE CARDIOVERTER-DEFIBRILLATOR (ICD) ELECTRODE LEAD PLACEMENT N/A 3/9/2020    Procedure: REVISION, INSERTION, ELECTRODE LEAD, ICD;  Surgeon: Harry Yun MD;  Location: [facility] EP LAB;  Service: Cardiology;  Laterality: N/A;  VT, ICD Gen Change and Lead Revision, MDT, MAC, DM,3 Prep    TREATMENT OF CARDIAC ARRHYTHMIA  10/18/2019    Procedure: Cardioversion or Defibrillation;  Surgeon: Raz Wagner MD;  Location: [facility] EP LAB;  Service: Cardiology;;         FAMILY HISTORY:                Family History   Problem Relation Age of Onset    Hypertension Father     Diabetes Father     Coronary artery disease Father     Heart disease Father         CHF    No Known Problems Mother     Cancer Sister 54        breast CA    No Known Problems Brother        SOCIAL HISTORY:          Tobacco:   Social History     Tobacco Use   Smoking Status Former Smoker    Packs/day: 1.00    Years: 31.00    Pack years: 31.00    Types: Cigarettes    Last attempt to quit: 2018    Years since quittin.3   Smokeless Tobacco Never Used   Tobacco Comment    pt is quiting on his own - pt stated not  qualified for program; 2/16 pt  quit on his own       alcohol use:    Social History     Substance and Sexual Activity   Alcohol Use No    Alcohol/week: 0.0 standard drinks    Frequency: Never    Drinks per session: Patient refused    Binge frequency: Never    Comment: one fifth of gin or rum/week                 Occupation:computer acquisition    ALLERGIES:    Review of patient's allergies indicates:   Allergen Reactions    Lisinopril Anaphylaxis    Hydralazine analogues      Chronic constipation, impotence, dizziness       CURRENT MEDICATIONS:    Current Outpatient Medications   Medication Sig Dispense Refill    allopurinol (ZYLOPRIM) 100 MG tablet TAKE 1 TABLET BY MOUTH EVERY DAY 30 tablet 5    amiodarone (PACERONE) 400 MG tablet Take 1 tablet (400 mg total) by mouth once daily. 30 tablet 11    amLODIPine (NORVASC) 10 MG tablet Take 1 tablet (10 mg total) by mouth once daily. (Patient taking differently: Take 10 mg by mouth every evening. ) 30 tablet 11    aspirin (ECOTRIN) 81 MG EC tablet Take 1 tablet (81 mg total) by mouth once daily. 100 tablet 3    doxazosin (CARDURA) 8 MG Tab Take 1 tablet (8 mg total) by mouth every 12 (twelve) hours. 60 tablet 11    ferrous gluconate 324 mg (37.5 mg iron) Tab tablet Take 1 tablet (324 mg total) by mouth daily with breakfast. 30 tablet 11    fluticasone propionate (FLONASE) 50 mcg/actuation nasal spray 2 sprays (100 mcg total) by Each Nostril route once daily. 16 g 3    guanFACINE (TENEX) 1 MG Tab Take 1 tablet (1 mg total) by mouth every evening. 30 tablet 5    pantoprazole (PROTONIX) 40 MG tablet TAKE 1 TABLET (40 MG TOTAL) BY MOUTH ONCE DAILY. 90 tablet 3    paroxetine (PAXIL) 10 MG tablet Take 1 tablet (10 mg total) by mouth every morning. 30 tablet 3    predniSONE (DELTASONE) 2.5 MG tablet Take 3 tablets (7.5 mg total) by mouth once daily for 10 days, THEN 2 tablets (5 mg total) once daily for 10 days, THEN 1 tablet (2.5 mg total) once daily for 15  days. Then discontinue.. 65 tablet 0    sildenafiL (VIAGRA) 100 MG tablet Take 1 tablet (100 mg total) by mouth daily as needed for Erectile Dysfunction. 10 tablet 1    spironolactone (ALDACTONE) 25 MG tablet Take 1 tablet (25 mg total) by mouth once daily. 30 tablet 11    warfarin (COUMADIN) 5 MG tablet Take 5mg orally daily except 2.5mg orally on Monday / Fridays 30 tablet 11    zolpidem (AMBIEN) 10 mg Tab Take 1 tablet (10 mg total) by mouth nightly as needed. 30 tablet 5     No current facility-administered medications for this visit.                   REVIEW OF SYSTEMS:   Review of Systems   Constitutional: Positive for fatigue. Negative for fever, chills, activity change and appetite change.   HENT: Negative for congestion.    Respiratory: Positive for apnea, snoring, dyspnea on extertion (< 1 flight, 30 yerds) and Paroxysmal Nocturnal Dyspnea (gasping for air). Negative for cough and shortness of breath.    Cardiovascular: Positive for chest pain and palpitations. Negative for leg swelling.   Musculoskeletal: Positive for gait problem.   Psychiatric/Behavioral: Positive for sleep disturbance.        PHYSICAL EXAM:  There were no vitals filed for this visit.  There is no height or weight on file to calculate BMI.   Physical Exam   Constitutional: He is oriented to person, place, and time. He appears well-developed. No distress. He is obese.   HENT:   Head: Normocephalic.   Pulmonary/Chest: Normal expansion and effort normal.   Neurological: He is alert and oriented to person, place, and time.   Skin: He is not diaphoretic.   Psychiatric: He has a normal mood and affect. His behavior is normal. Thought content normal.                                               DATA:  TSH:  Lab Results   Component Value Date    TSH 12.98 (H) 06/03/2020     CBC:  Lab Results   Component Value Date    WBC 7.00 06/03/2020    HGB 13.5 (L) 06/03/2020    HCT 41.9 06/03/2020    MCV 85 06/03/2020     06/03/2020      BMP:  Lab Results   Component Value Date     06/03/2020    K 4.0 06/03/2020     (L) 06/03/2020    CO2 26 06/03/2020    BUN 22 (H) 06/03/2020    CREATININE 1.7 (H) 06/03/2020    CALCIUM 9.3 06/03/2020    ANIONGAP 12 06/03/2020    ESTGFRAFRICA 52.1 (A) 06/03/2020    EGFRNONAA 45.0 (A) 06/03/2020     HgbA1C:  Lab Results   Component Value Date    HGBA1C 5.5 03/08/2018        PFT 8/29/2019 ratio 69 fev1 54 fvc 61.3 tlc 61.2 dlco 62.7 fef 25-75% 38.6   Spirometry shows a low FEF 25 - 75 suggesting mild obstruction is present. Lung volumes show moderate restriction.  DLCO is mildly decreased    CT chest 4/28/2020: No acute pathology identified.  Left ventricular assist device is visualized with no definite abnormality of driveline pump or fluid collection/inflammatory changes surrounding the driveline.    Cardiomegaly with small volume pericardial effusion.    Hepatomegaly.    Scattered areas of subsegmental ground-glass attenuation with upper lung zone predominance, with considerations including mild pulmonary edema, infection, and non infectious inflammation.    Sleep study:N/A    ECHO today: EF < 10% on LVAD        ASSESSMENT    ICD-10-CM ICD-9-CM    1. CHICHI (obstructive sleep apnea) G47.33 327.23 Polysomnogram (CPAP will be added if patient meets diagnostic criteria.)       PLAN:    Problem List Items Addressed This Visit        Unprioritized    CHICHI (obstructive sleep apnea) - Primary    Overview     Patient with symptoms of snoring, witnessed apnea, fatigue, EDS with finding morbid obesity, severe CHF EF< 10% which warrants an in lab study.         Relevant Orders    Polysomnogram (CPAP will be added if patient meets diagnostic criteria.)         Polysomnogram. The nature of this procedure and its indication was discussed with the patient.     Education: During our discussion today, we talked about the etiology of obstructive sleep apnea as well as the potential ramifications of untreated sleep apnea,  which could include daytime sleepiness, hypertension, heart disease and/or stroke.       Patient will Follow up follow up following test results , for will call for you to return once we obtain result.

## 2020-06-09 ENCOUNTER — TELEPHONE (OUTPATIENT)
Dept: ELECTROPHYSIOLOGY | Facility: CLINIC | Age: 55
End: 2020-06-09

## 2020-06-09 DIAGNOSIS — I49.01 VENTRICULAR FIBRILLATION: Primary | ICD-10-CM

## 2020-06-11 ENCOUNTER — PATIENT MESSAGE (OUTPATIENT)
Dept: TRANSPLANT | Facility: CLINIC | Age: 55
End: 2020-06-11

## 2020-06-11 ENCOUNTER — TELEPHONE (OUTPATIENT)
Dept: PULMONOLOGY | Facility: CLINIC | Age: 55
End: 2020-06-11

## 2020-06-11 DIAGNOSIS — R06.02 SHORTNESS OF BREATH: Primary | ICD-10-CM

## 2020-06-11 NOTE — TELEPHONE ENCOUNTER
----- Message from Donell Catalan RRT sent at 6/11/2020  1:22 PM CDT -----  Jordan Vallejo,    Mr. Richards (MRN [id number]) is scheduled for his sleep study on 7/10. Please put in an order for Covid test and have your MA schedule the appt. Thanks

## 2020-06-11 NOTE — TELEPHONE ENCOUNTER
Pt message received.  Dr. Rhodes reviewed pt echo and if pt would like to proceed with lowering speed pt ok to go to 9800 rpms.      Called and spoke to pt , pt reports intermittent hand tingling and palpitations at higher speed.  Pt will come tomorrow to [facility] to decrease speed to 9800 rpms.   Pt encouraged to call VAD coordinator with any questions problems or concerns. Pt reminded to page VAD coordinator on call for emergencies.  Pt verbalized understanding and in agreement of plan.

## 2020-06-12 ENCOUNTER — CLINICAL SUPPORT (OUTPATIENT)
Dept: TRANSPLANT | Facility: CLINIC | Age: 55
End: 2020-06-12
Payer: COMMERCIAL

## 2020-06-12 NOTE — PROGRESS NOTES
Pt VAD interrogation completed, VAD parameters at baseline.  Pt speed decreased to 9800 rpms with LSL 9400 per Dr. Rhodes after reviewing recent echo and pt request.  Backup controller also programmed with matching settings.  Pt tolerated well.  Pt encouraged to call VAD coordinator with any questions problems or concerns. Pt reminded to page VAD coordinator on call for emergencies.  Pt verbalized understanding and in agreement of plan.

## 2020-06-17 ENCOUNTER — ANTI-COAG VISIT (OUTPATIENT)
Dept: CARDIOLOGY | Facility: CLINIC | Age: 55
End: 2020-06-17
Payer: COMMERCIAL

## 2020-06-17 DIAGNOSIS — Z79.01 LONG TERM (CURRENT) USE OF ANTICOAGULANTS: ICD-10-CM

## 2020-06-17 DIAGNOSIS — Z95.811 LVAD (LEFT VENTRICULAR ASSIST DEVICE) PRESENT: ICD-10-CM

## 2020-06-17 PROCEDURE — 93793 PR ANTICOAGULANT MGMT FOR PT TAKING WARFARIN: ICD-10-PCS | Mod: S$GLB,,,

## 2020-06-17 PROCEDURE — 93793 ANTICOAG MGMT PT WARFARIN: CPT | Mod: S$GLB,,,

## 2020-06-17 NOTE — PROGRESS NOTES
INR at goal. Medications reviewed and no changes noted to necessitate warfarin adjustment.   See calendar.

## 2020-06-19 ENCOUNTER — LAB VISIT (OUTPATIENT)
Dept: LAB | Facility: HOSPITAL | Age: 55
End: 2020-06-19
Attending: INTERNAL MEDICINE
Payer: COMMERCIAL

## 2020-06-19 DIAGNOSIS — E05.80 AMIODARONE-INDUCED HYPERTHYROIDISM: ICD-10-CM

## 2020-06-19 DIAGNOSIS — T46.2X5A AMIODARONE-INDUCED HYPERTHYROIDISM: ICD-10-CM

## 2020-06-19 LAB
T4 FREE SERPL-MCNC: 1.04 NG/DL (ref 0.71–1.51)
TSH SERPL DL<=0.005 MIU/L-ACNC: 12.42 UIU/ML (ref 0.4–4)

## 2020-06-19 PROCEDURE — 84443 ASSAY THYROID STIM HORMONE: CPT

## 2020-06-19 PROCEDURE — 84439 ASSAY OF FREE THYROXINE: CPT

## 2020-06-19 PROCEDURE — 36415 COLL VENOUS BLD VENIPUNCTURE: CPT

## 2020-06-24 ENCOUNTER — ANTI-COAG VISIT (OUTPATIENT)
Dept: CARDIOLOGY | Facility: CLINIC | Age: 55
End: 2020-06-24
Payer: COMMERCIAL

## 2020-06-24 DIAGNOSIS — Z79.01 LONG TERM (CURRENT) USE OF ANTICOAGULANTS: ICD-10-CM

## 2020-06-24 DIAGNOSIS — Z95.811 LVAD (LEFT VENTRICULAR ASSIST DEVICE) PRESENT: ICD-10-CM

## 2020-06-24 PROCEDURE — 93793 PR ANTICOAGULANT MGMT FOR PT TAKING WARFARIN: ICD-10-PCS | Mod: S$GLB,,,

## 2020-06-24 PROCEDURE — 93793 ANTICOAG MGMT PT WARFARIN: CPT | Mod: S$GLB,,,

## 2020-06-29 RX ORDER — DOXYCYCLINE 100 MG/1
100 CAPSULE ORAL 2 TIMES DAILY
Qty: 20 CAPSULE | Refills: 0 | Status: ON HOLD | OUTPATIENT
Start: 2020-06-29 | End: 2020-07-13 | Stop reason: HOSPADM

## 2020-06-29 NOTE — TELEPHONE ENCOUNTER
Spoke with patient, states incision looks the same, but had pus drainage coming out of wound.  Cleaning with peroxide.  Denies fever/chills.  Declines device clinic appt today.   Reviewed with Dr. Wagner (consult MD), advises to start doxycycline 100mg BID x 10 days and see Dr. Yun next week.  Reviewed plan with patient.  Patient in agreement.  Requests appt for Friday 7/10/20 with Dr. Yun.  Advised patient to report to the ER if any worsening in his wound.  PT verbalized understanding.      ----- Message from Mariana Banad sent at 6/29/2020 11:32 AM CDT -----    ----- Message -----  From: Natalie Palacio MA  Sent: 6/29/2020  11:14 AM CDT  To: [facility] Arrhythmia Device Staff    The patient wife would like her  to come in today to get his device check she said  it's  leaking. Please call  [phone]. Thank you.

## 2020-07-01 ENCOUNTER — TELEPHONE (OUTPATIENT)
Dept: CARDIOLOGY | Facility: HOSPITAL | Age: 55
End: 2020-07-01

## 2020-07-01 ENCOUNTER — CLINICAL SUPPORT (OUTPATIENT)
Dept: CARDIOLOGY | Facility: HOSPITAL | Age: 55
DRG: 261 | End: 2020-07-01
Attending: INTERNAL MEDICINE
Payer: COMMERCIAL

## 2020-07-01 ENCOUNTER — CLINICAL SUPPORT (OUTPATIENT)
Dept: TRANSPLANT | Facility: CLINIC | Age: 55
DRG: 261 | End: 2020-07-01
Payer: COMMERCIAL

## 2020-07-01 ENCOUNTER — DOCUMENTATION ONLY (OUTPATIENT)
Dept: ELECTROPHYSIOLOGY | Facility: CLINIC | Age: 55
End: 2020-07-01

## 2020-07-01 ENCOUNTER — HOSPITAL ENCOUNTER (INPATIENT)
Facility: HOSPITAL | Age: 55
LOS: 12 days | Discharge: HOME | DRG: 261 | End: 2020-07-13
Attending: INTERNAL MEDICINE | Admitting: INTERNAL MEDICINE
Payer: COMMERCIAL

## 2020-07-01 DIAGNOSIS — I31.39 PERICARDIAL EFFUSION: ICD-10-CM

## 2020-07-01 DIAGNOSIS — M10.239: ICD-10-CM

## 2020-07-01 DIAGNOSIS — Z95.811 HEART REPLACED BY HEART ASSIST DEVICE: Primary | ICD-10-CM

## 2020-07-01 DIAGNOSIS — Z95.811 LVAD (LEFT VENTRICULAR ASSIST DEVICE) PRESENT: ICD-10-CM

## 2020-07-01 DIAGNOSIS — D62 ACUTE BLOOD LOSS ANEMIA: ICD-10-CM

## 2020-07-01 DIAGNOSIS — R78.81 BACTEREMIA: ICD-10-CM

## 2020-07-01 DIAGNOSIS — E86.1 HYPOTENSION DUE TO HYPOVOLEMIA: ICD-10-CM

## 2020-07-01 DIAGNOSIS — I47.20 VT (VENTRICULAR TACHYCARDIA): Chronic | ICD-10-CM

## 2020-07-01 DIAGNOSIS — Z79.01 ANTICOAGULATION MONITORING, INR RANGE 2-3: ICD-10-CM

## 2020-07-01 DIAGNOSIS — T82.7XXA INFECTION INVOLVING IMPLANTABLE CARDIOVERTER-DEFIBRILLATOR (ICD), INITIAL ENCOUNTER: Primary | ICD-10-CM

## 2020-07-01 DIAGNOSIS — I42.8 NICM (NONISCHEMIC CARDIOMYOPATHY): Chronic | ICD-10-CM

## 2020-07-01 DIAGNOSIS — Z95.811 HEART REPLACED BY HEART ASSIST DEVICE: ICD-10-CM

## 2020-07-01 DIAGNOSIS — T82.7XXA INFECTION INVOLVING IMPLANTABLE CARDIOVERTER-DEFIBRILLATOR (ICD): ICD-10-CM

## 2020-07-01 DIAGNOSIS — T82.7XXA ICD (IMPLANTABLE CARDIOVERTER-DEFIBRILLATOR) INFECTION: ICD-10-CM

## 2020-07-01 DIAGNOSIS — I50.42 CHRONIC COMBINED SYSTOLIC AND DIASTOLIC HEART FAILURE: ICD-10-CM

## 2020-07-01 DIAGNOSIS — R74.02 ELEVATED LDH: ICD-10-CM

## 2020-07-01 DIAGNOSIS — E03.8 OTHER SPECIFIED HYPOTHYROIDISM: ICD-10-CM

## 2020-07-01 DIAGNOSIS — I49.01 VF (VENTRICULAR FIBRILLATION): ICD-10-CM

## 2020-07-01 DIAGNOSIS — I49.9 ARRHYTHMIA: ICD-10-CM

## 2020-07-01 LAB
SARS-COV-2 RDRP RESP QL NAA+PROBE: NEGATIVE
T4 FREE SERPL-MCNC: 0.93 NG/DL (ref 0.71–1.51)
TSH SERPL DL<=0.005 MIU/L-ACNC: 13.18 UIU/ML (ref 0.4–4)

## 2020-07-01 PROCEDURE — U0002 COVID-19 LAB TEST NON-CDC: HCPCS

## 2020-07-01 PROCEDURE — 99223 PR INITIAL HOSPITAL CARE,LEVL III: ICD-10-PCS | Mod: ,,, | Performed by: INTERNAL MEDICINE

## 2020-07-01 PROCEDURE — 84439 ASSAY OF FREE THYROXINE: CPT

## 2020-07-01 PROCEDURE — 99223 1ST HOSP IP/OBS HIGH 75: CPT | Mod: ,,, | Performed by: INTERNAL MEDICINE

## 2020-07-01 PROCEDURE — 27000248 HC VAD-ADDITIONAL DAY

## 2020-07-01 PROCEDURE — 87186 SC STD MICRODIL/AGAR DIL: CPT

## 2020-07-01 PROCEDURE — 63600175 PHARM REV CODE 636 W HCPCS: Performed by: INTERNAL MEDICINE

## 2020-07-01 PROCEDURE — 93750 INTERROGATION VAD IN PERSON: CPT | Mod: ,,, | Performed by: INTERNAL MEDICINE

## 2020-07-01 PROCEDURE — 87070 CULTURE OTHR SPECIMN AEROBIC: CPT | Mod: 59

## 2020-07-01 PROCEDURE — 36415 COLL VENOUS BLD VENIPUNCTURE: CPT

## 2020-07-01 PROCEDURE — 87077 CULTURE AEROBIC IDENTIFY: CPT

## 2020-07-01 PROCEDURE — 87075 CULTR BACTERIA EXCEPT BLOOD: CPT

## 2020-07-01 PROCEDURE — 99223 PR INITIAL HOSPITAL CARE,LEVL III: ICD-10-PCS | Mod: AI,25,, | Performed by: INTERNAL MEDICINE

## 2020-07-01 PROCEDURE — 20600001 HC STEP DOWN PRIVATE ROOM

## 2020-07-01 PROCEDURE — 87075 CULTR BACTERIA EXCEPT BLOOD: CPT | Mod: 59

## 2020-07-01 PROCEDURE — 27000685 HC LVAD KIT (30 DAY SUPPLY)

## 2020-07-01 PROCEDURE — 87070 CULTURE OTHR SPECIMN AEROBIC: CPT

## 2020-07-01 PROCEDURE — 63600175 PHARM REV CODE 636 W HCPCS: Performed by: STUDENT IN AN ORGANIZED HEALTH CARE EDUCATION/TRAINING PROGRAM

## 2020-07-01 PROCEDURE — 87076 CULTURE ANAEROBE IDENT EACH: CPT

## 2020-07-01 PROCEDURE — 99223 1ST HOSP IP/OBS HIGH 75: CPT | Mod: AI,25,, | Performed by: INTERNAL MEDICINE

## 2020-07-01 PROCEDURE — 84443 ASSAY THYROID STIM HORMONE: CPT

## 2020-07-01 PROCEDURE — 25000003 PHARM REV CODE 250: Performed by: STUDENT IN AN ORGANIZED HEALTH CARE EDUCATION/TRAINING PROGRAM

## 2020-07-01 PROCEDURE — 93750 PR INTERROGATE VENT ASSIST DEV, IN PERSON, W PHYSICIAN ANALYSIS: ICD-10-PCS | Mod: ,,, | Performed by: INTERNAL MEDICINE

## 2020-07-01 RX ORDER — LIDOCAINE HYDROCHLORIDE 10 MG/ML
2.1 INJECTION INFILTRATION; PERINEURAL ONCE
Status: DISCONTINUED | OUTPATIENT
Start: 2020-07-01 | End: 2020-07-01

## 2020-07-01 RX ORDER — POTASSIUM CHLORIDE 20 MEQ/1
20 TABLET, EXTENDED RELEASE ORAL 2 TIMES DAILY
Status: DISCONTINUED | OUTPATIENT
Start: 2020-07-01 | End: 2020-07-11

## 2020-07-01 RX ORDER — TORSEMIDE 20 MG/1
40 TABLET ORAL DAILY
Status: DISCONTINUED | OUTPATIENT
Start: 2020-07-02 | End: 2020-07-04

## 2020-07-01 RX ORDER — FERROUS GLUCONATE 324(37.5)
324 TABLET ORAL
Status: DISCONTINUED | OUTPATIENT
Start: 2020-07-02 | End: 2020-07-14 | Stop reason: HOSPADM

## 2020-07-01 RX ORDER — POTASSIUM CHLORIDE 20 MEQ/1
20 TABLET, EXTENDED RELEASE ORAL 2 TIMES DAILY
Status: ON HOLD | COMMUNITY
End: 2020-07-13 | Stop reason: SDUPTHER

## 2020-07-01 RX ORDER — GUANFACINE 1 MG/1
1 TABLET ORAL NIGHTLY
Status: DISCONTINUED | OUTPATIENT
Start: 2020-07-01 | End: 2020-07-07

## 2020-07-01 RX ORDER — ALLOPURINOL 100 MG/1
100 TABLET ORAL DAILY
Status: DISCONTINUED | OUTPATIENT
Start: 2020-07-02 | End: 2020-07-14 | Stop reason: HOSPADM

## 2020-07-01 RX ORDER — AMIODARONE HYDROCHLORIDE 200 MG/1
200 TABLET ORAL DAILY
Status: DISCONTINUED | OUTPATIENT
Start: 2020-07-02 | End: 2020-07-14 | Stop reason: HOSPADM

## 2020-07-01 RX ORDER — WARFARIN 2.5 MG/1
2.5 TABLET ORAL DAILY
Status: DISCONTINUED | OUTPATIENT
Start: 2020-07-01 | End: 2020-07-02

## 2020-07-01 RX ORDER — PAROXETINE 10 MG/1
10 TABLET, FILM COATED ORAL EVERY MORNING
Status: DISCONTINUED | OUTPATIENT
Start: 2020-07-02 | End: 2020-07-14 | Stop reason: HOSPADM

## 2020-07-01 RX ORDER — CEFTRIAXONE 1 G/1
2 INJECTION, POWDER, FOR SOLUTION INTRAMUSCULAR; INTRAVENOUS ONCE
Status: DISCONTINUED | OUTPATIENT
Start: 2020-07-01 | End: 2020-07-01

## 2020-07-01 RX ORDER — CHOLECALCIFEROL (VITAMIN D3) 25 MCG
1000 TABLET ORAL DAILY
COMMUNITY
End: 2022-06-27

## 2020-07-01 RX ORDER — CHOLECALCIFEROL (VITAMIN D3) 25 MCG
1000 TABLET ORAL DAILY
Status: DISCONTINUED | OUTPATIENT
Start: 2020-07-02 | End: 2020-07-14 | Stop reason: HOSPADM

## 2020-07-01 RX ORDER — AMLODIPINE BESYLATE 10 MG/1
10 TABLET ORAL DAILY
Status: DISCONTINUED | OUTPATIENT
Start: 2020-07-02 | End: 2020-07-14 | Stop reason: HOSPADM

## 2020-07-01 RX ORDER — ASPIRIN 81 MG/1
81 TABLET ORAL DAILY
Status: DISCONTINUED | OUTPATIENT
Start: 2020-07-02 | End: 2020-07-14 | Stop reason: HOSPADM

## 2020-07-01 RX ORDER — FLUTICASONE PROPIONATE 50 MCG
2 SPRAY, SUSPENSION (ML) NASAL DAILY
Status: DISCONTINUED | OUTPATIENT
Start: 2020-07-02 | End: 2020-07-14 | Stop reason: HOSPADM

## 2020-07-01 RX ORDER — ZOLPIDEM TARTRATE 5 MG/1
10 TABLET ORAL NIGHTLY PRN
Status: DISCONTINUED | OUTPATIENT
Start: 2020-07-01 | End: 2020-07-14 | Stop reason: HOSPADM

## 2020-07-01 RX ORDER — SPIRONOLACTONE 25 MG/1
25 TABLET ORAL
Status: DISCONTINUED | OUTPATIENT
Start: 2020-07-02 | End: 2020-07-04

## 2020-07-01 RX ORDER — PANTOPRAZOLE SODIUM 40 MG/1
40 TABLET, DELAYED RELEASE ORAL
Status: DISCONTINUED | OUTPATIENT
Start: 2020-07-02 | End: 2020-07-14 | Stop reason: HOSPADM

## 2020-07-01 RX ORDER — DOXAZOSIN 4 MG/1
8 TABLET ORAL EVERY 12 HOURS
Status: DISCONTINUED | OUTPATIENT
Start: 2020-07-01 | End: 2020-07-10

## 2020-07-01 RX ADMIN — ZOLPIDEM TARTRATE 10 MG: 5 TABLET ORAL at 09:07

## 2020-07-01 RX ADMIN — VANCOMYCIN HYDROCHLORIDE 1750 MG: 750 INJECTION, POWDER, LYOPHILIZED, FOR SOLUTION INTRAVENOUS at 09:07

## 2020-07-01 RX ADMIN — GUANFACINE 1 MG: 1 TABLET ORAL at 09:07

## 2020-07-01 RX ADMIN — DOXAZOSIN 8 MG: 8 TABLET ORAL at 09:07

## 2020-07-01 RX ADMIN — CEFTRIAXONE 2 G: 2 INJECTION, SOLUTION INTRAVENOUS at 05:07

## 2020-07-01 RX ADMIN — POTASSIUM CHLORIDE 20 MEQ: 1500 TABLET, EXTENDED RELEASE ORAL at 09:07

## 2020-07-01 RX ADMIN — WARFARIN SODIUM 2.5 MG: 2.5 TABLET ORAL at 04:07

## 2020-07-01 NOTE — Clinical Note
Prepped: groin and chest. Prepped with: ChloraPrep and Ioban. The site was clipped. The patient was draped.

## 2020-07-01 NOTE — PROGRESS NOTES
"LVAD dressing changed with kit per orders.  Site "1", CDI, without redness or swelling. No drainage noted to drain sponge.  Site cleansed with CHG, then cultured per MD orders.  Sterile dressing change completed after.  [place] replaced.  Pt tolerated well.  Will continue to monitor.  "

## 2020-07-01 NOTE — PROGRESS NOTES
Arrhythmia Department    Patient was seen in device clinic today with c/o incision site draining and getting shocked on Saturday 6/27/20. Device interrogation revealed HV x 1 on 6/27/20 @ 3:41 pm for MMVT, Type 2 break.,CL-188 bpm. Patient reported sitting in car bending over when it occurred, not doing anything strenuous. Patient denies LOC. Patient reports overall feeling palpitations, tired, no energy, and COLEMAN, even prior to HV therapy.    Incision site pics uploaded in media in patient's chart. Incision has hole right over device site. Wife reports yellow pus oozing from site this past week. Was prescribed antibiotics on Monday 6/29/20, and advised to come get admitted that day, but patient reported he didn't think it was necessary at that time. Denies fever, but reports intermittent nausea and chills, and Left side of Left hand tingling.     Reviewed with Dr. Wagner, orders to have him admitted under [facility] service for IV abx and ID consult. Called and spoke with Maira Crum NP and Bridget Luna, LVAD coordinator, to get everything arranged. Patient sent to LVAD clinic for next steps.

## 2020-07-01 NOTE — TELEPHONE ENCOUNTER
Patient admitted due to device wound infection.  ----- Message from Lucero Zhou sent at 7/1/2020  9:30 AM CDT -----  Regarding: FW: Alarm    ----- Message -----  From: Waleska Fallon  Sent: 7/1/2020   9:17 AM CDT  To: [facility] Arrhythmia Device Staff  Subject: Alarm                                            Pt's pacemaker alarm went off, Pt also had a discharge on Saturday if can call Pt. Thanks    [phone]

## 2020-07-01 NOTE — ASSESSMENT & PLAN NOTE
BiV-Icd [employer] (2014 Dr. Chiu), successful DFT at 35J on 10/18/2019. Multiple VT episodes,  generator change (BiV ICD -> dual ICD),  device revision (addition of a new RV/ICD lead) and another VF induction via ICD on 3/2020. Presented from device clinic with device erosion.     - Start with broad spectrum antibiotics, and consult ID   - Two sets of blood culture  - Continue current device setting and current antiarrythmic agents   - Plan to evaluate for device extraction in near future    - When antibiotics is concluded (per ID recommendations), will consider an alternative method of ICD implantation

## 2020-07-01 NOTE — H&P
[facility]  Heart Transplant  H&P    Patient Name: Tim Richards  MRN: [id number]  Admission Date: 7/1/2020  Attending Physician: Antonio Hadley Jr.,*  Primary Care Provider: Power Connors MD  Principal Problem:<principal problem not specified>    Subjective:     History of Present Illness:  55 yo AAM with DCM, HBP, CHF stage D s/p HM 2, ICD lead replacement 03/09/2020, hyperthyroidism,  sent in from clinic with a ICD pocket site infection. Patient wife reports yellow pus oozing from site this past week. Was prescribed antibiotics on Monday 6/29/20, and advised to come get admitted that day, but patient reported he didn't think it was necessary at that time. Denies fever, but reports intermittent nausea and chills, and Left side of Left hand tingling.      VAD parameters at baseline.  Pt speed decreased to 9800 rpms from 10,000 on 06/12/2020    ICD device check - 07/01/2020 --. Device interrogation revealed HV x 1 on 6/27/20 @ 3:41 pm for MMVT, Type 2 break.,CL-188 bpm. Patient reported sitting in car bending over when it occurred, not doing anything strenuous. Patient denies LOC. Patient reports overall feeling palpitations, tired, no energy, and COLEMAN, even prior to HV therapy.          Past Medical History:   Diagnosis Date    Anticoagulant long-term use     CHF (congestive heart failure)     Dilated cardiomyopathy 1/10/2018    Disorder of kidney and ureter     CKD    Encounter for blood transfusion     Gout     HTN (hypertension)     Thyroid disease     Ventricular tachycardia (paroxysmal)        Past Surgical History:   Procedure Laterality Date    CARDIAC CATHETERIZATION  Dec. 2012    CARDIAC DEFIBRILLATOR PLACEMENT Left     CRRT-D    COLONOSCOPY N/A 3/6/2018    Procedure: COLONOSCOPY;  Surgeon: Alonso Bone MD;  Location: [facility] ([address] [place] [address]);  Service: Endoscopy;  Laterality: N/A;    COLONOSCOPY N/A 7/17/2019    Procedure: COLONOSCOPY;  Surgeon: Blane Valdez MD;   Location: [facility] ENDO (2ND FLR);  Service: Endoscopy;  Laterality: N/A;    COLONOSCOPY N/A 7/18/2019    Procedure: COLONOSCOPY;  Surgeon: Blane Valdez MD;  Location: [facility] ENDO (2ND FLR);  Service: Endoscopy;  Laterality: N/A;    ESOPHAGOGASTRODUODENOSCOPY N/A 7/17/2019    Procedure: EGD (ESOPHAGOGASTRODUODENOSCOPY);  Surgeon: Blane Valdez MD;  Location: [facility] SAMM (2ND FLR);  Service: Endoscopy;  Laterality: N/A;    ESOPHAGOGASTRODUODENOSCOPY N/A 7/18/2019    Procedure: EGD (ESOPHAGOGASTRODUODENOSCOPY);  Surgeon: Blane Valdez MD;  Location: [facility] SAMM (2ND FLR);  Service: Endoscopy;  Laterality: N/A;    NONINVASIVE CARDIAC ELECTROPHYSIOLOGY STUDY N/A 10/18/2019    Procedure: CARDIAC ELECTROPHYSIOLOGY STUDY, NONINVASIVE;  Surgeon: Raz Wagner MD;  Location: [facility] EP LAB;  Service: Cardiology;  Laterality: N/A;  VT, DFTs, MDT CRTD in situ, LVAD, anes, MB, 3098    REPLACEMENT OF IMPLANTABLE CARDIOVERTER-DEFIBRILLATOR (ICD) GENERATOR N/A 3/9/2020    Procedure: REPLACEMENT, ICD GENERATOR;  Surgeon: Harry Yun MD;  Location: [facility] EP LAB;  Service: Cardiology;  Laterality: N/A;  VT, ICD Gen Change and Lead Revision, MDT, MAC, DM,3 Prep    REVISION OF IMPLANTABLE CARDIOVERTER-DEFIBRILLATOR (ICD) ELECTRODE LEAD PLACEMENT N/A 3/9/2020    Procedure: REVISION, INSERTION, ELECTRODE LEAD, ICD;  Surgeon: Harry Yun MD;  Location: [facility] EP LAB;  Service: Cardiology;  Laterality: N/A;  VT, ICD Gen Change and Lead Revision, MDT, MAC, DM,3 Prep    TREATMENT OF CARDIAC ARRHYTHMIA  10/18/2019    Procedure: Cardioversion or Defibrillation;  Surgeon: Raz Wagner MD;  Location: [facility] EP LAB;  Service: Cardiology;;       Review of patient's allergies indicates:   Allergen Reactions    Lisinopril Anaphylaxis    Hydralazine analogues      Chronic constipation, impotence, dizziness       Current Facility-Administered Medications   Medication    [START ON 7/2/2020] allopurinoL tablet 100 mg    [START ON 7/2/2020]  amiodarone tablet 200 mg    [START ON 2020] amLODIPine tablet 10 mg    [START ON 2020] aspirin EC tablet 81 mg    cefTRIAXone (ROCEPHIN) 2 g in dextrose 5 % 50 mL IVPB (ready to mix system)    doxazosin tablet 8 mg    [START ON 2020] ferrous gluconate tablet 324 mg    [START ON 2020] fluticasone propionate 50 mcg/actuation nasal spray 100 mcg    guanFACINE tablet 1 mg    [START ON 2020] pantoprazole EC tablet 40 mg    [START ON 2020] paroxetine tablet 10 mg    potassium chloride SA CR tablet 20 mEq    [START ON 2020] spironolactone tablet 25 mg    [START ON 2020] torsemide tablet 40 mg    vancomycin - pharmacy to dose    [START ON 2020] vitamin D 1000 units tablet 1,000 Units    warfarin (COUMADIN) tablet 2.5 mg    zolpidem tablet 10 mg     Family History     Problem Relation (Age of Onset)    Cancer Sister (54)    Coronary artery disease Father    Diabetes Father    Heart disease Father    Hypertension Father    No Known Problems Mother, Brother        Tobacco Use    Smoking status: Former Smoker     Packs/day: 1.00     Years: 31.00     Pack years: 31.00     Types: Cigarettes     Quit date: 2018     Years since quittin.4    Smokeless tobacco: Never Used    Tobacco comment: pt is quiting on his own - pt stated not qualified for program;  pt  quit on his own   Substance and Sexual Activity    Alcohol use: No     Alcohol/week: 0.0 standard drinks     Frequency: Never     Drinks per session: Patient refused     Binge frequency: Never     Comment: one fifth of gin or rum/week    Drug use: No    Sexual activity: Yes     Partners: Female     Birth control/protection: None     Comment: 10/5/17  with same partner 7 years      Review of Systems   Constitutional: Positive for chills and fatigue. Negative for diaphoresis, fever and unexpected weight change.   Respiratory: Negative.    Cardiovascular: Negative.  Negative for chest pain and leg  swelling.   Gastrointestinal: Negative.    Skin: Positive for wound.     Objective:     Vital Signs (Most Recent):  Temp: 97.5 °F (36.4 °C) (07/01/20 1625)  Pulse: 65 (07/01/20 1625)  Resp: 16 (07/01/20 1625)  BP: (!) 90/0 (07/01/20 1626)  SpO2: 95 % (07/01/20 1625) Vital Signs (24h Range):  Temp:  [97.5 °F (36.4 °C)-97.8 °F (36.6 °C)] 97.5 °F (36.4 °C)  Pulse:  [64-68] 65  Resp:  [16] 16  SpO2:  [95 %] 95 %  BP: (90)/(0) 90/0     Patient Vitals for the past 72 hrs (Last 3 readings):   Weight   07/01/20 1400 125.5 kg (276 lb 10.8 oz)     Body mass index is 36.5 kg/m².    No intake or output data in the 24 hours ending 07/01/20 1656    Physical Exam  Constitutional:       Appearance: Normal appearance. He is obese.   HENT:      Head: Normocephalic and atraumatic.   Neck:      Musculoskeletal: Normal range of motion.   Cardiovascular:      Comments: LVAD - smooth LVAD hum present  Pulmonary:      Effort: Pulmonary effort is normal.      Breath sounds: Normal breath sounds.   Skin:     Comments: Left chest wound with pus like discharge from an open wound over the ICD generator.   Neurological:      Mental Status: He is alert.         Significant Labs:  CBC:  No results for input(s): WBC, RBC, HGB, HCT, PLT, MCV, MCH, MCHC in the last 168 hours.  BNP:  No results for input(s): BNP in the last 168 hours.    Invalid input(s): BNPTRIAGELBLO  CMP:  No results for input(s): GLU, CALCIUM, ALBUMIN, PROT, NA, K, CO2, CL, BUN, CREATININE, ALKPHOS, ALT, AST, BILITOT in the last 168 hours.   Coagulation:   No results for input(s): PT, INR, APTT in the last 168 hours.  LDH:  No results for input(s): LDH in the last 72 hours.  Microbiology:  Microbiology Results (last 7 days)     Procedure Component Value Units Date/Time    Culture, Anaerobe [[id number]]     Order Status: No result Specimen: Skin from Abdomen     Aerobic culture [[id number]]     Order Status: No result Specimen: Skin from Abdomen     Culture, Anaerobe [[id number]]      Order Status: No result Specimen: Incision site from Chest, Left     Aerobic culture [[id number]]     Order Status: No result Specimen: Incision site from Chest, Left           I have reviewed all pertinent labs within the past 24 hours.    Diagnostic Results:    Assessment/Plan:     ICD (implantable cardioverter-defibrillator) infection  - presented with device pocket infection  - has been on oral doxycycline at home for the last 1 week with no improvement  - denies any fever but does admit to fatigue and chills   - labs pending   - ICD device check - 07/01/2020 --. Device interrogation revealed HV x 1 on 6/27/20 @ 3:41 pm for MMVT, Type 2 break.,CL-188 bpm.    Plan   - CBC with site cultures and blood cultures.  - ID consulted who recommended IV vancomycin and IV ceftriaxone  - EP informed who will plan an outpatient generator change and pocket site debridement.       VT (ventricular tachycardia)  - device check showed a VT episode with shock on 06/27  - patient denies any LOC   - c/w amiodarone 200 mg daily.   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  Procedure: Device Interrogation Including analysis of device parameters  Current Settings: Ventricular Assist Device  Review of device function is stable/unstable stable    TXP LVAD INTERROGATIONS 7/1/2020 6/3/2020 5/21/2020 4/29/2020 4/29/2020 4/29/2020 4/28/2020   Type HeartMate II - - HeartMate II HeartMate II HeartMate II HeartMate II   Flow 5.3 - - 5.3 5.4 5.7 5.6   Speed 9800 - - 28592 9800 9800 9800   PI 3.8 - - 4 3.9 4.7 3.7   Power ([place]) 6.4 - - 6.3 6.4 6.0 6.5   LSL 9400 - - 9600 - - -   Pulsatility - No Pulse No Pulse - - Intermittent pulse Intermittent pulse       NICM (nonischemic cardiomyopathy)  - currently mildly hypervolumic on exam  - c/w with home dose of torsemide        Alonzo Bang,  MD  Heart Transplant  [facility]

## 2020-07-01 NOTE — ASSESSMENT & PLAN NOTE
- presented with device pocket infection  - has been on oral doxycycline at home for the last 1 week with no improvement  - denies any fever but does admit to fatigue and chills   - labs pending   - ICD device check - 07/01/2020 --. Device interrogation revealed HV x 1 on 6/27/20 @ 3:41 pm for MMVT, Type 2 break.,CL-188 bpm.    Plan   - CBC with site cultures and blood cultures.  - ID consulted who recommended IV vancomycin and IV ceftriaxone  - EP informed who will plan an outpatient generator change and pocket site debridement.

## 2020-07-01 NOTE — Clinical Note
Capped RV lead extracted using laser. OLD RV lead tip cultured and sent to lab  (Capped RV:   [employer] Quatro Secure [id number]  SN: [id number]  Implant Date: 10/31/2014)

## 2020-07-01 NOTE — PROGRESS NOTES
DP continues to be elevated. Still unable to obtain NIBP with MAP.  Pt denying any c/o dizziness lightheadedness, blurry vision, H/A, or discomfort. No scheduled or PRN BP meds available at this time.  Dr. Bowser notified.  Awaiting further orders.  Will continue to monitor.        07/01/20 1625 07/01/20 1626   Vital Signs   Temp 97.5 °F (36.4 °C)  --    Temp src Oral  --    Pulse 65  --    Heart Rate Source Monitor  --    Resp 16  --    SpO2 95 %  --    O2 Device (Oxygen Therapy) room air  --    BP  --  (!) 90/0   BP Location Left arm Left arm   BP Method Automatic Doppler   Patient Position Lying Lying

## 2020-07-01 NOTE — PROGRESS NOTES
DP elevated upon admission. Unable to obtain NIBP with MAP.  Pt denying any c/o dizziness lightheadedness, blurry vision, H/A, or discomfort. Other VSS.  No scheduled or PRN BP meds available at this time.  Dr. Bowser notified.  Awaiting further orders.  Will continue to monitor.        07/01/20 1400 07/01/20 1401   Vital Signs   Temp 97.8 °F (36.6 °C)  --    Temp src Oral  --    Pulse 64  --    Heart Rate Source Monitor  --    Resp 16  --    SpO2 95 %  --    O2 Device (Oxygen Therapy) room air  --    BP  --  (!) 90/0   BP Location Left arm Left arm   BP Method Automatic Doppler   Patient Position Lying Lying

## 2020-07-01 NOTE — PLAN OF CARE
Pt free of falls/trauma/injuries.  Denies c/o SOB, CP, or discomfort.   LVAD working properly this shift without any complications.  LVAD dressing to be changed every other day with kit; dressing remains CDI.   Cultures of DLES sent.  LCW AICD wound cultured as well; plan for eventual extraction.  Pt being treated with Rocephin IVPB daily and Vanc per pharmacy recs.  Pt being diuresed with Torsemide PO; diuresing well.   Plan to continue with infection and wound care management.  Pt tolerating plan of care.

## 2020-07-01 NOTE — SUBJECTIVE & OBJECTIVE
Past Medical History:   Diagnosis Date    Anticoagulant long-term use     CHF (congestive heart failure)     Dilated cardiomyopathy 1/10/2018    Disorder of kidney and ureter     CKD    Encounter for blood transfusion     Gout     HTN (hypertension)     Thyroid disease     Ventricular tachycardia (paroxysmal)        Past Surgical History:   Procedure Laterality Date    CARDIAC CATHETERIZATION  Dec. 2012    CARDIAC DEFIBRILLATOR PLACEMENT Left     CRRT-D    COLONOSCOPY N/A 3/6/2018    Procedure: COLONOSCOPY;  Surgeon: Alonso Bone MD;  Location: [facility] ENDO (2ND FLR);  Service: Endoscopy;  Laterality: N/A;    COLONOSCOPY N/A 7/17/2019    Procedure: COLONOSCOPY;  Surgeon: Blane Valdez MD;  Location: [facility] ENDO (2ND FLR);  Service: Endoscopy;  Laterality: N/A;    COLONOSCOPY N/A 7/18/2019    Procedure: COLONOSCOPY;  Surgeon: Blane Valdez MD;  Location: [facility] ENDO (2ND FLR);  Service: Endoscopy;  Laterality: N/A;    ESOPHAGOGASTRODUODENOSCOPY N/A 7/17/2019    Procedure: EGD (ESOPHAGOGASTRODUODENOSCOPY);  Surgeon: Blane Valdez MD;  Location: [facility] ENDO (2ND FLR);  Service: Endoscopy;  Laterality: N/A;    ESOPHAGOGASTRODUODENOSCOPY N/A 7/18/2019    Procedure: EGD (ESOPHAGOGASTRODUODENOSCOPY);  Surgeon: Blane Valdez MD;  Location: [facility] ENDO (2ND FLR);  Service: Endoscopy;  Laterality: N/A;    NONINVASIVE CARDIAC ELECTROPHYSIOLOGY STUDY N/A 10/18/2019    Procedure: CARDIAC ELECTROPHYSIOLOGY STUDY, NONINVASIVE;  Surgeon: Raz Wagner MD;  Location: [facility] EP LAB;  Service: Cardiology;  Laterality: N/A;  VT, DFTs, MDT CRTD in situ, LVAD, juarez MB, 3098    REPLACEMENT OF IMPLANTABLE CARDIOVERTER-DEFIBRILLATOR (ICD) GENERATOR N/A 3/9/2020    Procedure: REPLACEMENT, ICD GENERATOR;  Surgeon: Harry Yun MD;  Location: [facility] EP LAB;  Service: Cardiology;  Laterality: N/A;  VT, ICD Gen Change and Lead Revision, MDT, MAC, DM,3 Prep    REVISION OF IMPLANTABLE CARDIOVERTER-DEFIBRILLATOR (ICD) ELECTRODE LEAD PLACEMENT  N/A 3/9/2020    Procedure: REVISION, INSERTION, ELECTRODE LEAD, ICD;  Surgeon: Harry Yun MD;  Location: [facility] EP LAB;  Service: Cardiology;  Laterality: N/A;  VT, ICD Gen Change and Lead Revision, MDT, MAC, DM,3 Prep    TREATMENT OF CARDIAC ARRHYTHMIA  10/18/2019    Procedure: Cardioversion or Defibrillation;  Surgeon: Raz Wagner MD;  Location: [facility] EP LAB;  Service: Cardiology;;       Review of patient's allergies indicates:   Allergen Reactions    Lisinopril Anaphylaxis    Hydralazine analogues      Chronic constipation, impotence, dizziness       Current Facility-Administered Medications   Medication    [START ON 2020] allopurinoL tablet 100 mg    [START ON 2020] amiodarone tablet 200 mg    [START ON 2020] amLODIPine tablet 10 mg    [START ON 2020] aspirin EC tablet 81 mg    cefTRIAXone (ROCEPHIN) 2 g in dextrose 5 % 50 mL IVPB (ready to mix system)    doxazosin tablet 8 mg    [START ON 2020] ferrous gluconate tablet 324 mg    [START ON 2020] fluticasone propionate 50 mcg/actuation nasal spray 100 mcg    guanFACINE tablet 1 mg    [START ON 2020] pantoprazole EC tablet 40 mg    [START ON 2020] paroxetine tablet 10 mg    potassium chloride SA CR tablet 20 mEq    [START ON 2020] spironolactone tablet 25 mg    [START ON 2020] torsemide tablet 40 mg    vancomycin - pharmacy to dose    [START ON 2020] vitamin D 1000 units tablet 1,000 Units    warfarin (COUMADIN) tablet 2.5 mg    zolpidem tablet 10 mg     Family History     Problem Relation (Age of Onset)    Cancer Sister (54)    Coronary artery disease Father    Diabetes Father    Heart disease Father    Hypertension Father    No Known Problems Mother, Brother        Tobacco Use    Smoking status: Former Smoker     Packs/day: 1.00     Years: 31.00     Pack years: 31.00     Types: Cigarettes     Quit date: 2018     Years since quittin.4    Smokeless tobacco: Never Used     Tobacco comment: pt is quiting on his own - pt stated not qualified for program; 2/16 pt  quit on his own   Substance and Sexual Activity    Alcohol use: No     Alcohol/week: 0.0 standard drinks     Frequency: Never     Drinks per session: Patient refused     Binge frequency: Never     Comment: one fifth of gin or rum/week    Drug use: No    Sexual activity: Yes     Partners: Female     Birth control/protection: None     Comment: 10/5/17  with same partner 7 years      Review of Systems   Constitutional: Positive for chills and fatigue. Negative for diaphoresis, fever and unexpected weight change.   Respiratory: Negative.    Cardiovascular: Negative.  Negative for chest pain and leg swelling.   Gastrointestinal: Negative.    Skin: Positive for wound.     Objective:     Vital Signs (Most Recent):  Temp: 97.5 °F (36.4 °C) (07/01/20 1625)  Pulse: 65 (07/01/20 1625)  Resp: 16 (07/01/20 1625)  BP: (!) 90/0 (07/01/20 1626)  SpO2: 95 % (07/01/20 1625) Vital Signs (24h Range):  Temp:  [97.5 °F (36.4 °C)-97.8 °F (36.6 °C)] 97.5 °F (36.4 °C)  Pulse:  [64-68] 65  Resp:  [16] 16  SpO2:  [95 %] 95 %  BP: (90)/(0) 90/0     Patient Vitals for the past 72 hrs (Last 3 readings):   Weight   07/01/20 1400 125.5 kg (276 lb 10.8 oz)     Body mass index is 36.5 kg/m².    No intake or output data in the 24 hours ending 07/01/20 1656    Physical Exam  Constitutional:       Appearance: Normal appearance. He is obese.   HENT:      Head: Normocephalic and atraumatic.   Neck:      Musculoskeletal: Normal range of motion.   Cardiovascular:      Comments: LVAD - smooth LVAD hum present  Pulmonary:      Effort: Pulmonary effort is normal.      Breath sounds: Normal breath sounds.   Skin:     Comments: Left chest wound with pus like discharge from an open wound over the ICD generator.   Neurological:      Mental Status: He is alert.         Significant Labs:  CBC:  No results for input(s): WBC, RBC, HGB, HCT, PLT, MCV, MCH, MCHC in the  last 168 hours.  BNP:  No results for input(s): BNP in the last 168 hours.    Invalid input(s): BNPTRIAGELBLO  CMP:  No results for input(s): GLU, CALCIUM, ALBUMIN, PROT, NA, K, CO2, CL, BUN, CREATININE, ALKPHOS, ALT, AST, BILITOT in the last 168 hours.   Coagulation:   No results for input(s): PT, INR, APTT in the last 168 hours.  LDH:  No results for input(s): LDH in the last 72 hours.  Microbiology:  Microbiology Results (last 7 days)     Procedure Component Value Units Date/Time    Culture, Anaerobe [[id number]]     Order Status: No result Specimen: Skin from Abdomen     Aerobic culture [[id number]]     Order Status: No result Specimen: Skin from Abdomen     Culture, Anaerobe [[id number]]     Order Status: No result Specimen: Incision site from Chest, Left     Aerobic culture [[id number]]     Order Status: No result Specimen: Incision site from Chest, Left           I have reviewed all pertinent labs within the past 24 hours.    Diagnostic Results:

## 2020-07-01 NOTE — PROGRESS NOTES
NO EXTERNAL POWER alarm noted during LVAD interrogation on admit to CSU.  Pt and wife report that the power went out at their house for a short time, and that he swapped over to battery once awakened by the alarms.  MARGARET Collazo, LVAD Coordinator notified. No new orders at this time. Will continue to monitor.

## 2020-07-01 NOTE — ASSESSMENT & PLAN NOTE
- drive site clean and dry but will send site cultures.  Procedure: Device Interrogation Including analysis of device parameters  Current Settings: Ventricular Assist Device  Review of device function is stable/unstable stable    TXP LVAD INTERROGATIONS 7/1/2020 6/3/2020 5/21/2020 4/29/2020 4/29/2020 4/29/2020 4/28/2020   Type HeartMate II - - HeartMate II HeartMate II HeartMate II HeartMate II   Flow 5.3 - - 5.3 5.4 5.7 5.6   Speed 9800 - - 75428 9800 9800 9800   PI 3.8 - - 4 3.9 4.7 3.7   Power (Jackson) 6.4 - - 6.3 6.4 6.0 6.5   LSL 9400 - - 9600 - - -   Pulsatility - No Pulse No Pulse - - Intermittent pulse Intermittent pulse

## 2020-07-01 NOTE — Clinical Note
Tightrail used and LV lead  is extracted and LV lead tip was sent for culture to lab  (Capped LV:  NISHA Performa Quad Polar 4298-88  SN: [id number]  Implant Date: 10/31/2014)

## 2020-07-01 NOTE — PROGRESS NOTES
Pt arrived to CSU as a direct admission from LVAD Clinic.  Pt arrived via wheelchair with wife at side.  COVID test in clinic negative.  LVAD equipment reconciled.  VS obtained; DP elevated and unable to obtain NIBP with MAP.  Dr. Bowser notified of arrival and elevated DP.  Awaiting further orders.  Pt and wife oriented to room and unit. Bed in lowest position with siderails up x2. Call bell within reach; pt instructed to call for assistance.  Pt updated with POC.  Will continue to monitor.

## 2020-07-01 NOTE — ASSESSMENT & PLAN NOTE
- device check showed a VT episode with shock on 06/27  - patient denies any LOC   - c/w amiodarone 200 mg daily.   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

## 2020-07-01 NOTE — Clinical Note
Pre-existing leads (RV Lead  MDT Sprint Quad [id number]   SN: [id number]  Implant Date: 3/9/2020 AND  RA Lead   MDT Capsure Fix Novus [id number]   SN: [id number]     Implant date: 10/31/2014) DISCONNECTED from pre-existing GENERATOR ( MDT ICD Mily JAMES DF 1 [id number]    SN: [id number]     Implant Date: 3/9/2020 )

## 2020-07-01 NOTE — Clinical Note
RV Lead extracted from patient using stylet and tip cultured and sent to lab.     ( NISHA Hickman [id number]  SN: [id number]  Implant Date: 3/9/2020 )

## 2020-07-01 NOTE — Clinical Note
Prepped: groin and chest. Prepped with: ChloraPrep and Ioban. The site was clipped. The patient was draped. Rescrubbed after field contaminated

## 2020-07-01 NOTE — Clinical Note
RA Lead extracted using tightrail. RA lead tip cultured and sent to lab.  (RA Lead MDT Capsure Fix Novus [id number]   SN: [id number]  Implant date: 10/31/2014)

## 2020-07-01 NOTE — Clinical Note
Existing Device:  MDT ICD Mily JAMES DF 1 [id number]  SN: [id number]  Implant Date: 3/9/2020    RV Lead   MDT Sprint Quad [id number]  SN: [id number]  Implant Date: 3/9/2020    RA Lead  MDJUAN Capsure Fix Novus [id number]  SN: [id number]  Implant date: 10/31/2014    Capped LV:  NISHA Performa Quad Polar 4298-88  SN: [id number]  Implant Date: 10/31/2014    Capped RV:   German Quatro Secure [id number]  SN: [id number]  Implant Date: 10/31/2014

## 2020-07-01 NOTE — CONSULTS
[facility]  Cardiac Electrophysiology  Consult Note    Admission Date: 7/1/2020  Code Status: Full Code   Attending Provider: Antonio Hadley Jr.,*  Consulting Provider: Raciel Beth MD  Principal Problem:<principal problem not specified>    Consults  Subjective:     Chief Complaint:  Lead infection      HPI:   Tim Richards is a 54 y.o. male with DCM (EF 10%, grade III DD) s/p HM II with Outflow graft changed (03/9/18) as DT, BiV-Icd Medtronic (2014 Dr. Chiu), prior history of VT/VF. He presented to device clinic 07/01/2020 c/o incision site draining and getting shocked on Saturday 6/27/20. Device interrogation revealed HV x 1 on 6/27/20 at 3:41 pm for MMVT, Type 2 break,CL-188 bpm. Wife reports yellow pus oozing from site this past week. Was prescribed antibiotics on Monday 6/29/20.     He was admitted to the hospital in 10/2019 with ICD shocks. When he came to the ER he was noted to be in MMVT. He had multiple rounds of ATP that degenerated the MMVT into VF and the he was unsuccessfully shocked multiple times . He was eventually shocked externally to convert into sinus rhythm.  It was postulated reasons for ineffective therapy (had worsening hypervolemia week prior, length of time in the arrhythmia prior to ICD shock, IV amiodarone causing higher defibrillatory tissue threshold, or change in heart-shock vector post LVAD implant).     He underwent a successful DFT at 35J on 10/18/2019. With shock-on-T at 400/300 ms, VF was induced. 25J resulted in a type 2 break. VF was induced a second time. 25J failed but 35J was successful. He had a NIPS which showed successful DFT at 35J. Following that admission, he underwent Device check noted on 12/14/19 - one episode of MMVT 270 ms that was appropriately successfully shocked. He has had multiple non sustained slower VT. Of note is he had a monitoring zone added during his admission at 133. VT 1 zone at 167 does not have ATP and goes  directly to 35J shock. We will add 6 rounds of ATP before shock in that zone.    On 3/2020, he underwent generator change (BiV ICD -> dual ICD),  device revision (addition of a new RV/ICD lead) and another VF induction via ICD. DFT test. New RV HV lead placed (dual coil) to replace current single coil RV HV lead in place given prior failed shocks from device. LV lead and old RV HV lead capped in pocket. RA and new RV lead connected to new device given prior generator at [facility]. DFT performed with successful defibrillation on first shock from device.     Past Medical History:   Diagnosis Date    Anticoagulant long-term use     CHF (congestive heart failure)     Dilated cardiomyopathy 1/10/2018    Disorder of kidney and ureter     CKD    Encounter for blood transfusion     Gout     HTN (hypertension)     Thyroid disease     Ventricular tachycardia (paroxysmal)        Past Surgical History:   Procedure Laterality Date    CARDIAC CATHETERIZATION  Dec. 2012    CARDIAC DEFIBRILLATOR PLACEMENT Left     CRRT-D    COLONOSCOPY N/A 3/6/2018    Procedure: COLONOSCOPY;  Surgeon: Alonso Bone MD;  [facility]  Service: Endoscopy;  Laterality: N/A;    COLONOSCOPY N/A 7/17/2019    Procedure: COLONOSCOPY;  Surgeon: Blane Valdez MD;  Location: [facility] (2ND FLR);  Service: Endoscopy;  Laterality: N/A;    COLONOSCOPY N/A 7/18/2019    Procedure: COLONOSCOPY;  Surgeon: Blane Valdez MD;  Location: [facility] ENDO (2ND FLR);  Service: Endoscopy;  Laterality: N/A;    ESOPHAGOGASTRODUODENOSCOPY N/A 7/17/2019    Procedure: EGD (ESOPHAGOGASTRODUODENOSCOPY);  Surgeon: Blane Valdez MD;  [facility]  Service: Endoscopy;  Laterality: N/A;    ESOPHAGOGASTRODUODENOSCOPY N/A 7/18/2019    Procedure: EGD (ESOPHAGOGASTRODUODENOSCOPY);  Surgeon: Blane Valdez MD;  [facility]  Service: Endoscopy;  Laterality: N/A;    NONINVASIVE CARDIAC ELECTROPHYSIOLOGY STUDY N/A 10/18/2019    Procedure:  CARDIAC ELECTROPHYSIOLOGY STUDY, NONINVASIVE;  Surgeon: Raz Wagner MD;  Location: [facility] EP LAB;  Service: Cardiology;  Laterality: N/A;  VT, DFTs, MDT CRTD in situ, LVAD, LASHELL pizarro, 3098    REPLACEMENT OF IMPLANTABLE CARDIOVERTER-DEFIBRILLATOR (ICD) GENERATOR N/A 3/9/2020    Procedure: REPLACEMENT, ICD GENERATOR;  Surgeon: Harry Yun MD;  Location: [facility] EP LAB;  Service: Cardiology;  Laterality: N/A;  VT, ICD Gen Change and Lead Revision, MDT, MAC, DM,3 Prep    REVISION OF IMPLANTABLE CARDIOVERTER-DEFIBRILLATOR (ICD) ELECTRODE LEAD PLACEMENT N/A 3/9/2020    Procedure: REVISION, INSERTION, ELECTRODE LEAD, ICD;  Surgeon: Harry Yun MD;  Location: [facility] EP LAB;  Service: Cardiology;  Laterality: N/A;  VT, ICD Gen Change and Lead Revision, MDT, MAC, DM,3 Prep    TREATMENT OF CARDIAC ARRHYTHMIA  10/18/2019    Procedure: Cardioversion or Defibrillation;  Surgeon: Raz Wagner MD;  Location: [facility] EP LAB;  Service: Cardiology;;       Review of patient's allergies indicates:   Allergen Reactions    Lisinopril Anaphylaxis    Hydralazine analogues      Chronic constipation, impotence, dizziness       No current facility-administered medications on file prior to encounter.      Current Outpatient Medications on File Prior to Encounter   Medication Sig    allopurinol (ZYLOPRIM) 100 MG tablet TAKE 1 TABLET BY MOUTH EVERY DAY (Patient taking differently: Take 100 mg by mouth nightly. )    amiodarone (PACERONE) 400 MG tablet Take 1 tablet (400 mg total) by mouth once daily. (Patient taking differently: Take 200 mg by mouth once daily. )    amLODIPine (NORVASC) 10 MG tablet Take 1 tablet (10 mg total) by mouth once daily.    doxazosin (CARDURA) 8 MG Tab Take 1 tablet (8 mg total) by mouth every 12 (twelve) hours.    doxycycline (VIBRAMYCIN) 100 MG Cap Take 1 capsule (100 mg total) by mouth 2 (two) times daily. for 10 days    ferrous gluconate 324 mg (37.5 mg iron) Tab tablet Take 1 tablet (324 mg  total) by mouth daily with breakfast. (Patient taking differently: Take 324 mg by mouth daily with lunch. )    guanFACINE (TENEX) 1 MG Tab Take 1 tablet (1 mg total) by mouth every evening.    pantoprazole (PROTONIX) 40 MG tablet TAKE 1 TABLET (40 MG TOTAL) BY MOUTH ONCE DAILY. (Patient taking differently: Take 40 mg by mouth daily with lunch. )    paroxetine (PAXIL) 10 MG tablet Take 1 tablet (10 mg total) by mouth every morning.    potassium chloride SA (K-DUR,KLOR-CON) 20 MEQ tablet Take 20 mEq by mouth 2 (two) times daily.    predniSONE (DELTASONE) 2.5 MG tablet Take 3 tablets (7.5 mg total) by mouth once daily for 10 days, THEN 2 tablets (5 mg total) once daily for 10 days, THEN 1 tablet (2.5 mg total) once daily for 15 days. Then discontinue..    spironolactone (ALDACTONE) 25 MG tablet Take 1 tablet (25 mg total) by mouth once daily. (Patient taking differently: Take 25 mg by mouth daily with lunch. )    torsemide (DEMADEX) 20 MG Tab TAKE 2 TABS BY MOUTH EVERY MORNING AND 1 TAB EVERY EVENING FOR 3 DAYS THEN 2 TABS DAILY THEREAFTER (Patient taking differently: Take 40 mg by mouth once daily. TAKE 2 TABS BY MOUTH EVERY MORNING AND 1 TAB EVERY EVENING FOR 3 DAYS THEN 2 TABS DAILY THEREAFTER)    vitamin D (VITAMIN D3) 1000 units Tab Take 1,000 Units by mouth once daily.    zolpidem (AMBIEN) 10 mg Tab Take 1 tablet (10 mg total) by mouth nightly as needed.    aspirin (ECOTRIN) 81 MG EC tablet Take 1 tablet (81 mg total) by mouth once daily.    fluticasone propionate (FLONASE) 50 mcg/actuation nasal spray 2 sprays (100 mcg total) by Each Nostril route once daily.    sildenafiL (VIAGRA) 100 MG tablet Take 1 tablet (100 mg total) by mouth daily as needed for Erectile Dysfunction.    warfarin (COUMADIN) 5 MG tablet Take 5mg orally daily except 2.5mg orally on Monday / Fridays (Patient taking differently: Take 2.5 mg by mouth. Take 5mg orally daily except 2.5mg orally on Wednesdays and Saturdays)     Family  History     Problem Relation (Age of Onset)    Cancer Sister (54)    Coronary artery disease Father    Diabetes Father    Heart disease Father    Hypertension Father    No Known Problems Mother, Brother        Tobacco Use    Smoking status: Former Smoker     Packs/day: 1.00     Years: 31.00     Pack years: 31.00     Types: Cigarettes     Quit date: 2018     Years since quittin.4    Smokeless tobacco: Never Used    Tobacco comment: pt is quiting on his own - pt stated not qualified for program;  pt  quit on his own   Substance and Sexual Activity    Alcohol use: No     Alcohol/week: 0.0 standard drinks     Frequency: Never     Drinks per session: Patient refused     Binge frequency: Never     Comment: one fifth of gin or rum/week    Drug use: No    Sexual activity: Yes     Partners: Female     Birth control/protection: None     Comment: 10/5/17  with same partner 7 years      Review of Systems   Constitution: Negative for chills and decreased appetite.   HENT: Negative for congestion.    Cardiovascular: Negative for chest pain.   Respiratory: Negative for cough and shortness of breath.    Endocrine: Negative for cold intolerance and heat intolerance.   Skin: Negative for rash.   Musculoskeletal: Negative for arthritis and back pain.   Gastrointestinal: Negative for abdominal pain, constipation and diarrhea.   Genitourinary: Negative for dysuria and hematuria.   Neurological: Negative for dizziness and headaches.   Psychiatric/Behavioral: Negative for altered mental status.     Objective:     Vital Signs (Most Recent):  Temp: 97.5 °F (36.4 °C) (20 1625)  Pulse: 65 (20 1625)  Resp: 16 (20 1625)  BP: (!) 90/0 (20 1401)  SpO2: 95 % (20 1625) Vital Signs (24h Range):  Temp:  [97.5 °F (36.4 °C)-97.8 °F (36.6 °C)] 97.5 °F (36.4 °C)  Pulse:  [64-68] 65  Resp:  [16] 16  SpO2:  [95 %] 95 %  BP: (90)/(0) 90/0       Weight: 125.5 kg (276 lb 10.8 oz)  Body mass index is 36.5  kg/m².    SpO2: 95 %  O2 Device (Oxygen Therapy): room air    Physical Exam   Constitutional: He is oriented to person, place, and time. He appears well-developed and well-nourished. No distress.   HENT:   Head: Normocephalic.   Left Ear: External ear normal.   Eyes: Pupils are equal, round, and reactive to light. Right eye exhibits no discharge. Left eye exhibits no discharge.   Neck: Normal range of motion. No thyromegaly present.   Cardiovascular: Normal rate and regular rhythm. Exam reveals no friction rub.   Murmur heard.  Pulmonary/Chest: Effort normal and breath sounds normal. No respiratory distress.   Abdominal: Soft. Bowel sounds are normal. He exhibits distension. There is no abdominal tenderness.   Musculoskeletal: Normal range of motion.         General: No edema.   Neurological: He is alert and oriented to person, place, and time.   Skin: Rash noted. There is erythema.           Significant Labs: All pertinent lab results from the last 24 hours have been reviewed.              Assessment and Plan:     ICD (implantable cardioverter-defibrillator) infection  BiV-Icd Medtronic (2014 Dr. Chiu), successful DFT at 35J on 10/18/2019. Multiple VT episodes,  generator change (BiV ICD -> dual ICD),  device revision (addition of a new RV/ICD lead) and another VF induction via ICD on 3/2020. Presented from device clinic with device erosion.     - Keep ICD device setting same    - Start with broad spectrum antibiotics, and consult ID   - Two sets of blood culture  - Continue current device setting and current antiarrythmic agents   - Plan to evaluate for device extraction in near future    - When antibiotics is concluded (per ID recommendations), will consider an alternative method of ICD implantation         Thank you for your consult. I will follow-up with patient. Please contact us if you have any additional questions.    Raciel Beth MD  Cardiac Electrophysiology  [facility]

## 2020-07-01 NOTE — HPI
Tim Richards is a 54 y.o. male with DCM (EF 10%, grade III DD) s/p HM II with Outflow graft changed (03/9/18) as DT, BiV-Icd Medtronic (2014 Dr. Chiu), prior history of VT/VF. He presented to device clinic 07/01/2020 c/o incision site draining and getting shocked on Saturday 6/27/20. Device interrogation revealed HV x 1 on 6/27/20 at 3:41 pm for MMVT, Type 2 break,CL-188 bpm. Wife reports yellow pus oozing from site this past week. Was prescribed antibiotics on Monday 6/29/20.     He was admitted to the hospital in 10/2019 with ICD shocks. When he came to the ER he was noted to be in MMVT. He had multiple rounds of ATP that degenerated the MMVT into VF and the he was unsuccessfully shocked multiple times . He was eventually shocked externally to convert into sinus rhythm.  It was postulated reasons for ineffective therapy (had worsening hypervolemia week prior, length of time in the arrhythmia prior to ICD shock, IV amiodarone causing higher defibrillatory tissue threshold, or change in heart-shock vector post LVAD implant).     He underwent a successful DFT at 35J on 10/18/2019. With shock-on-T at 400/300 ms, VF was induced. 25J resulted in a type 2 break. VF was induced a second time. 25J failed but 35J was successful. He had a NIPS which showed successful DFT at 35J. Following that admission, he underwent Device check noted on 12/14/19 - one episode of MMVT 270 ms that was appropriately successfully shocked. He has had multiple non sustained slower VT. Of note is he had a monitoring zone added during his admission at 133. VT 1 zone at 167 does not have ATP and goes directly to 35J shock. We will add 6 rounds of ATP before shock in that zone.    On 3/2020, he underwent generator change (BiV ICD -> dual ICD),  device revision (addition of a new RV/ICD lead) and another VF induction via ICD. DFT test. New RV HV lead placed (dual coil) to replace current single coil RV HV lead in place given prior failed  shocks from device. LV lead and old RV HV lead capped in pocket. RA and new RV lead connected to new device given prior generator at [facility]. DFT performed with successful defibrillation on first shock from device.

## 2020-07-01 NOTE — TELEPHONE ENCOUNTER
Patient asked to send a remote transmission from his home monitor. He stated he will send this around noon today.   ----- Message from Waleska Fallon sent at 7/1/2020  9:17 AM CDT -----  Regarding: Alarm  Pt's pacemaker alarm went off, Pt also had a discharge on Saturday if can call Pt. Thanks    [phone]

## 2020-07-02 ENCOUNTER — TELEPHONE (OUTPATIENT)
Dept: ENDOCRINOLOGY | Facility: CLINIC | Age: 55
End: 2020-07-02

## 2020-07-02 LAB
ALBUMIN SERPL BCP-MCNC: 3.5 G/DL (ref 3.5–5.2)
ALP SERPL-CCNC: 86 U/L (ref 55–135)
ALT SERPL W/O P-5'-P-CCNC: 17 U/L (ref 10–44)
ANION GAP SERPL CALC-SCNC: 10 MMOL/L (ref 8–16)
APTT BLDCRRT: 29.6 SEC (ref 21–32)
AST SERPL-CCNC: 31 U/L (ref 10–40)
BASOPHILS # BLD AUTO: 0.03 K/UL (ref 0–0.2)
BASOPHILS NFR BLD: 0.5 % (ref 0–1.9)
BILIRUB DIRECT SERPL-MCNC: 0.3 MG/DL (ref 0.1–0.3)
BILIRUB SERPL-MCNC: 0.6 MG/DL (ref 0.1–1)
BUN SERPL-MCNC: 13 MG/DL (ref 6–20)
CALCIUM SERPL-MCNC: 9.3 MG/DL (ref 8.7–10.5)
CHLORIDE SERPL-SCNC: 103 MMOL/L (ref 95–110)
CO2 SERPL-SCNC: 25 MMOL/L (ref 23–29)
CREAT SERPL-MCNC: 1.9 MG/DL (ref 0.5–1.4)
DIFFERENTIAL METHOD: ABNORMAL
EOSINOPHIL # BLD AUTO: 0.1 K/UL (ref 0–0.5)
EOSINOPHIL NFR BLD: 2 % (ref 0–8)
ERYTHROCYTE [DISTWIDTH] IN BLOOD BY AUTOMATED COUNT: 15.6 % (ref 11.5–14.5)
EST. GFR  (AFRICAN AMERICAN): 45.5 ML/MIN/1.73 M^2
EST. GFR  (NON AFRICAN AMERICAN): 39.4 ML/MIN/1.73 M^2
GLUCOSE SERPL-MCNC: 81 MG/DL (ref 70–110)
HAPTOGLOB SERPL-MCNC: <10 MG/DL (ref 30–250)
HCT VFR BLD AUTO: 41.7 % (ref 40–54)
HGB BLD-MCNC: 12.5 G/DL (ref 14–18)
IMM GRANULOCYTES # BLD AUTO: 0.04 K/UL (ref 0–0.04)
IMM GRANULOCYTES NFR BLD AUTO: 0.7 % (ref 0–0.5)
INR PPP: 1.4 (ref 0.8–1.2)
LDH SERPL L TO P-CCNC: 590 U/L (ref 110–260)
LYMPHOCYTES # BLD AUTO: 0.9 K/UL (ref 1–4.8)
LYMPHOCYTES NFR BLD: 15.7 % (ref 18–48)
MAGNESIUM SERPL-MCNC: 2 MG/DL (ref 1.6–2.6)
MCH RBC QN AUTO: 26.7 PG (ref 27–31)
MCHC RBC AUTO-ENTMCNC: 30 G/DL (ref 32–36)
MCV RBC AUTO: 89 FL (ref 82–98)
MONOCYTES # BLD AUTO: 0.8 K/UL (ref 0.3–1)
MONOCYTES NFR BLD: 14.5 % (ref 4–15)
NEUTROPHILS # BLD AUTO: 3.7 K/UL (ref 1.8–7.7)
NEUTROPHILS NFR BLD: 66.6 % (ref 38–73)
NRBC BLD-RTO: 0 /100 WBC
PHOSPHATE SERPL-MCNC: 3.3 MG/DL (ref 2.7–4.5)
PLATELET # BLD AUTO: 223 K/UL (ref 150–350)
PMV BLD AUTO: 10.8 FL (ref 9.2–12.9)
POTASSIUM SERPL-SCNC: 3.5 MMOL/L (ref 3.5–5.1)
PROT SERPL-MCNC: 7.1 G/DL (ref 6–8.4)
PROTHROMBIN TIME: 14 SEC (ref 9–12.5)
RBC # BLD AUTO: 4.68 M/UL (ref 4.6–6.2)
SODIUM SERPL-SCNC: 138 MMOL/L (ref 136–145)
WBC # BLD AUTO: 5.6 K/UL (ref 3.9–12.7)

## 2020-07-02 PROCEDURE — 80048 BASIC METABOLIC PNL TOTAL CA: CPT

## 2020-07-02 PROCEDURE — 63600175 PHARM REV CODE 636 W HCPCS: Performed by: STUDENT IN AN ORGANIZED HEALTH CARE EDUCATION/TRAINING PROGRAM

## 2020-07-02 PROCEDURE — 85025 COMPLETE CBC W/AUTO DIFF WBC: CPT

## 2020-07-02 PROCEDURE — 83735 ASSAY OF MAGNESIUM: CPT

## 2020-07-02 PROCEDURE — 83615 LACTATE (LD) (LDH) ENZYME: CPT

## 2020-07-02 PROCEDURE — 36415 COLL VENOUS BLD VENIPUNCTURE: CPT

## 2020-07-02 PROCEDURE — 85730 THROMBOPLASTIN TIME PARTIAL: CPT

## 2020-07-02 PROCEDURE — 93750 INTERROGATION VAD IN PERSON: CPT | Mod: ,,, | Performed by: INTERNAL MEDICINE

## 2020-07-02 PROCEDURE — 20600001 HC STEP DOWN PRIVATE ROOM

## 2020-07-02 PROCEDURE — 85610 PROTHROMBIN TIME: CPT

## 2020-07-02 PROCEDURE — 83010 ASSAY OF HAPTOGLOBIN QUANT: CPT

## 2020-07-02 PROCEDURE — 87040 BLOOD CULTURE FOR BACTERIA: CPT

## 2020-07-02 PROCEDURE — 99233 SBSQ HOSP IP/OBS HIGH 50: CPT | Mod: ,,, | Performed by: INTERNAL MEDICINE

## 2020-07-02 PROCEDURE — 25000003 PHARM REV CODE 250: Performed by: STUDENT IN AN ORGANIZED HEALTH CARE EDUCATION/TRAINING PROGRAM

## 2020-07-02 PROCEDURE — 99233 PR SUBSEQUENT HOSPITAL CARE,LEVL III: ICD-10-PCS | Mod: ,,, | Performed by: INTERNAL MEDICINE

## 2020-07-02 PROCEDURE — 25000003 PHARM REV CODE 250: Performed by: INTERNAL MEDICINE

## 2020-07-02 PROCEDURE — 99223 1ST HOSP IP/OBS HIGH 75: CPT | Mod: ,,, | Performed by: INTERNAL MEDICINE

## 2020-07-02 PROCEDURE — 94761 N-INVAS EAR/PLS OXIMETRY MLT: CPT

## 2020-07-02 PROCEDURE — 99223 PR INITIAL HOSPITAL CARE,LEVL III: ICD-10-PCS | Mod: ,,, | Performed by: INTERNAL MEDICINE

## 2020-07-02 PROCEDURE — 97802 MEDICAL NUTRITION INDIV IN: CPT

## 2020-07-02 PROCEDURE — 93750 PR INTERROGATE VENT ASSIST DEV, IN PERSON, W PHYSICIAN ANALYSIS: ICD-10-PCS | Mod: ,,, | Performed by: INTERNAL MEDICINE

## 2020-07-02 PROCEDURE — 63600175 PHARM REV CODE 636 W HCPCS: Performed by: INTERNAL MEDICINE

## 2020-07-02 PROCEDURE — 80076 HEPATIC FUNCTION PANEL: CPT

## 2020-07-02 PROCEDURE — 27000248 HC VAD-ADDITIONAL DAY

## 2020-07-02 PROCEDURE — 84100 ASSAY OF PHOSPHORUS: CPT

## 2020-07-02 PROCEDURE — 25000242 PHARM REV CODE 250 ALT 637 W/ HCPCS: Performed by: STUDENT IN AN ORGANIZED HEALTH CARE EDUCATION/TRAINING PROGRAM

## 2020-07-02 RX ORDER — POTASSIUM CHLORIDE 20 MEQ/1
40 TABLET, EXTENDED RELEASE ORAL ONCE
Status: COMPLETED | OUTPATIENT
Start: 2020-07-02 | End: 2020-07-02

## 2020-07-02 RX ORDER — VANCOMYCIN HCL IN 5 % DEXTROSE 1G/250ML
1000 PLASTIC BAG, INJECTION (ML) INTRAVENOUS
Status: DISCONTINUED | OUTPATIENT
Start: 2020-07-02 | End: 2020-07-03

## 2020-07-02 RX ORDER — PREDNISONE 2.5 MG/1
2.5 TABLET ORAL DAILY
Status: DISCONTINUED | OUTPATIENT
Start: 2020-07-02 | End: 2020-07-14 | Stop reason: HOSPADM

## 2020-07-02 RX ORDER — WARFARIN 7.5 MG/1
7.5 TABLET ORAL ONCE
Status: COMPLETED | OUTPATIENT
Start: 2020-07-02 | End: 2020-07-02

## 2020-07-02 RX ADMIN — VANCOMYCIN HYDROCHLORIDE 1000 MG: 1 INJECTION, POWDER, LYOPHILIZED, FOR SOLUTION INTRAVENOUS at 09:07

## 2020-07-02 RX ADMIN — FERROUS GLUCONATE TAB 324 MG (37.5 MG ELEMENTAL IRON) 324 MG: 324 (37.5 FE) TAB at 12:07

## 2020-07-02 RX ADMIN — POTASSIUM CHLORIDE 40 MEQ: 1500 TABLET, EXTENDED RELEASE ORAL at 08:07

## 2020-07-02 RX ADMIN — PAROXETINE HYDROCHLORIDE 10 MG: 10 TABLET, FILM COATED ORAL at 08:07

## 2020-07-02 RX ADMIN — GUANFACINE 1 MG: 1 TABLET ORAL at 09:07

## 2020-07-02 RX ADMIN — TORSEMIDE 40 MG: 20 TABLET ORAL at 08:07

## 2020-07-02 RX ADMIN — DOXAZOSIN 8 MG: 8 TABLET ORAL at 09:07

## 2020-07-02 RX ADMIN — MELATONIN 1000 UNITS: at 08:07

## 2020-07-02 RX ADMIN — PANTOPRAZOLE SODIUM 40 MG: 40 TABLET, DELAYED RELEASE ORAL at 12:07

## 2020-07-02 RX ADMIN — POTASSIUM CHLORIDE 20 MEQ: 1500 TABLET, EXTENDED RELEASE ORAL at 09:07

## 2020-07-02 RX ADMIN — CEFTRIAXONE 2 G: 2 INJECTION, SOLUTION INTRAVENOUS at 05:07

## 2020-07-02 RX ADMIN — ASPIRIN 81 MG: 81 TABLET, COATED ORAL at 08:07

## 2020-07-02 RX ADMIN — POTASSIUM CHLORIDE 20 MEQ: 1500 TABLET, EXTENDED RELEASE ORAL at 08:07

## 2020-07-02 RX ADMIN — ZOLPIDEM TARTRATE 10 MG: 5 TABLET ORAL at 09:07

## 2020-07-02 RX ADMIN — SPIRONOLACTONE 25 MG: 25 TABLET ORAL at 12:07

## 2020-07-02 RX ADMIN — WARFARIN SODIUM 7.5 MG: 7.5 TABLET ORAL at 05:07

## 2020-07-02 RX ADMIN — DOXAZOSIN 8 MG: 8 TABLET ORAL at 08:07

## 2020-07-02 RX ADMIN — AMIODARONE HYDROCHLORIDE 200 MG: 200 TABLET ORAL at 08:07

## 2020-07-02 RX ADMIN — AMLODIPINE BESYLATE 10 MG: 10 TABLET ORAL at 08:07

## 2020-07-02 RX ADMIN — PREDNISONE 2.5 MG: 2.5 TABLET ORAL at 06:07

## 2020-07-02 RX ADMIN — ALLOPURINOL 100 MG: 100 TABLET ORAL at 08:07

## 2020-07-02 NOTE — PROGRESS NOTES
[facility]  Cardiac Electrophysiology  Progress Note    Admission Date: 7/1/2020  Code Status: Full Code   Attending Physician: Guerda Bautista MD   Expected Discharge Date: 7/6/2020  Principal Problem:ICD (implantable cardioverter-defibrillator) infection    Subjective:     Interval History: Patient doing well at bedside. Afebrile. Denies fevers, chills, chest pain, shortness of breath. Vitals stable.  Telemetry with no reports events.    ROS  Objective:     Vital Signs (Most Recent):  Temp: 98.4 °F (36.9 °C) (07/02/20 1216)  Pulse: 77 (07/02/20 1456)  Resp: 16 (07/02/20 1216)  BP: (!) 90/0 (07/02/20 1216)  SpO2: 98 % (07/02/20 1216) Vital Signs (24h Range):  Temp:  [97.5 °F (36.4 °C)-98.7 °F (37.1 °C)] 98.4 °F (36.9 °C)  Pulse:  [62-77] 77  Resp:  [16] 16  SpO2:  [91 %-98 %] 98 %  BP: (78-90)/(0) 90/0     Weight: 123.4 kg (272 lb 0.8 oz)  Body mass index is 35.89 kg/m².     SpO2: 98 %  O2 Device (Oxygen Therapy): room air    Physical Exam   Constitutional: He is oriented to person, place, and time. He appears well-developed and well-nourished. No distress.   HENT:   Head: Normocephalic.   Left Ear: External ear normal.   Eyes: Pupils are equal, round, and reactive to light. Right eye exhibits no discharge. Left eye exhibits no discharge.   Neck: Normal range of motion. No thyromegaly present.   Cardiovascular: Normal rate and regular rhythm. Exam reveals no friction rub.   Murmur heard.  Pulmonary/Chest: Effort normal and breath sounds normal. No respiratory distress.   Abdominal: Soft. Bowel sounds are normal. He exhibits distension. There is no abdominal tenderness.   Musculoskeletal: Normal range of motion.         General: No edema.   Neurological: He is alert and oriented to person, place, and time.   Skin: Rash noted. There is erythema.           Significant Labs:   CMP:   Recent Labs   Lab 07/02/20  0354      K 3.5      CO2 25   GLU 81   BUN 13   CREATININE 1.9*   CALCIUM 9.3    PROT 7.1   ALBUMIN 3.5   BILITOT 0.6   ALKPHOS 86   AST 31   ALT 17   ANIONGAP 10   ESTGFRAFRICA 45.5*   EGFRNONAA 39.4*    and CBC:   Recent Labs   Lab 07/02/20  0354   WBC 5.60   HGB 12.5*   HCT 41.7        Assessment and Plan:     * ICD (implantable cardioverter-defibrillator) infection  BiV-Icd Medtronic (2014 Dr. Chiu), successful DFT at 35J on 10/18/2019. Multiple VT episodes,  generator change (BiV ICD -> dual ICD),  device revision (addition of a new RV/ICD lead) and another VF induction via ICD on 3/2020. Presented from device clinic with device erosion.     - Agree continue with broad spectrum antibiotics, and consult ID   - Two sets of blood culture- pending results to determine whether patient will need long term IV abx vs PO antibiotics.  - Continue current device setting and current antiarrythmic agents   - Plan to evaluate for device extraction in near future    - When antibiotics is concluded (per ID recommendations), will consider an alternative method of ICD implantation         Lana Garza MD  Cardiac Electrophysiology  [facility]

## 2020-07-02 NOTE — PROGRESS NOTES
Patient denies symptoms including cough, fevers, SOB, diarrhea, loss of taste or smell.   Informed patient of process for Covid test.  Patient denies prior nasal surgery. Pt verbalized permission to proceed with test.    Nasopharyngeal rapid specimen collected.  Patient tolerated procedure well.    Test results negative.  Pt notified and sent to admit.  Admit notified.

## 2020-07-02 NOTE — HPI
Tim Richards is a 54 y.o. male with past medical history of DCM, CHF stage D s/p HM2, VT with ICD and lead replacement on 3/9/2020 who presents with an ICD pocket infection. He reports surgical glue was used with the lead replacement, and the incision never fully closed. He had a previous infection since then in April and was treated with oral abx. Cultures negative at that time. He reports complete resolution of the infection after treatment at that time. However, over the past week he has noticed yellow non-odorous drainage from the site. He was prescribed oral doxycycline on 6/29/2020 and advised to get admitted for further treatment. He did not think it was necessary at that time and continued the doxycycline without improvement. He denies fever, aches, or chills. On 7/1/2020 evening, he was started on IV vancomycin and cefriaxone and site cultures obtained. Wound cx 7/1/2020 resulted positive for GNR. Vancomycin d/c 7/3/2020.

## 2020-07-02 NOTE — ASSESSMENT & PLAN NOTE
- presented with device pocket infection  - has been on oral doxycycline at home for the last 1 week with no improvement  - denies any fever but does admit to fatigue and chills   - labs pending   - ICD device check - 07/01/2020 --. Device interrogation revealed HV x 1 on 6/27/20 @ 3:41 pm for MMVT, Type 2 break.,CL-188 bpm.    Plan   - ID consulted who recommended IV vancomycin and IV ceftriaxone. C/w with recx  - will f/u blood and site cultures and based on the sensitivities will narrow the antibitoics  - EP informed who will plan an outpatient generator change and pocket site debridement.  - patient will stay over the weekend o get IV antibiotics and get the final results of his culture.

## 2020-07-02 NOTE — ASSESSMENT & PLAN NOTE
- currently mildly hypervolumic on exam  - c/w with home dose of torsemide  - keep K > 4 and Mg >2

## 2020-07-02 NOTE — PT/OT/SLP PROGRESS
Physical Therapy      Patient Name:  Tim Richards   MRN:  [id number]    Pt is a 53 y/o male with LVAD in place, implanted 3/8/2018, who presents with ICD pocket site infection. Pt seen at bedside this date, found sitting up in bed on the phone. Pt reports that he was (I) with ambulation, ADLs, and LVAD management PTA, including working and driving. Pt reports no acute PT needs at this time, and states that he plans to ambulate in hallway with his wife once she arrives to hospital. Educated provided on importance of continued OOB activity and daily ambulation throughout hospital admission. Pt instructed to contact medical team if therapy needs arise during hospital admission. Pt verbalized understanding. PT orders discontinued at this time. Please re-consult if situation changes.     Nani Wagner, PT, DPT   7/2/2020  [phone]

## 2020-07-02 NOTE — PROGRESS NOTES
07/02/2020  Josefina Cook    Current provider:  Guerda Bautista MD      I, Josefina Cook, rounded on Tim Richards to ensure all mechanical assist device settings (IABP or VAD) were appropriate and all parameters were within limits.  I was able to ensure all back up equipment was present, the staff had no issues, and the Perfusion Department daily rounding was complete.    8:35 AM

## 2020-07-02 NOTE — PLAN OF CARE
Pt free of falls and injury during shift. POC reviewed with pt. VS stable. SR on telemetry. Recollected cultures at AICD incision site, due to previous swabs being , pt had already received one dose of Rocephin. No acute events noted at this time. No complaints. Educated pt why they are at risk for falls and to use call light for assistance ambulating. Yellow non-slip socks on pt. Bed low and locked, call light with in reach. Will continue to monitor.

## 2020-07-02 NOTE — CONSULTS
"    [facility]  Adult Nutrition  Consult Note    SUMMARY     Recommendations  1. Continue cardiac diet as tolerated, with fluid restriction per MD.   2. RD to monitor.    Goals: Adequate PO intake to meet > 75% EEN/EPN by RD follow up  Nutrition Goal Status: new  Communication of RD Recs: other (comment)(POC)    Reason for Assessment    Reason For Assessment: consult  Diagnosis: (principal problem not specified)  Relevant Medical History: DCM, CHF s/p LVAD, HTN, hyperthyroidism  Interdisciplinary Rounds: attended  General Information Comments: Pt reports good appetite both currently and PTA, and states he consumed 100% of breakfast this AM. He reports a 25# wt gain PTA since starting prednisone in December 2019, wt gain confirmed per chart. Pt reports his UBW is 260#. NFPE not warranted, pt with no indicators of malnutrition at this time. Pt endorses following a low Na, consistent vitamin K diet PTA and does not have questions about dietary restrictions at this time.  Nutrition Discharge Planning: d/c on cardiac diet    Nutrition Risk Screen    Nutrition Risk Screen: no indicators present    Nutrition/Diet History         Anthropometrics    Temp: 98.6 °F (37 °C)  Height Method: Stated  Height: 6' 1" (185.4 cm)  Height (inches): 73 in  Weight Method: Standard Scale  Weight: 123.4 kg (272 lb 0.8 oz)  Weight (lb): 272.05 lb  Ideal Body Weight (IBW), Male: 184 lb  % Ideal Body Weight, Male (lb): 150.37 %  BMI (Calculated): 35.9    Lab/Procedures/Meds    Pertinent Labs Reviewed: reviewed  Pertinent Labs Comments: Cr 1.9, GFR 39.4  Pertinent Medications Reviewed: reviewed  Pertinent Medications Comments: amiodarone, ferrous gluconate, pantoprazole, spironolactone, torsemide, vitamin D, warfarin    Estimated/Assessed Needs    Weight Used For Calorie Calculations: 123.4 kg (272 lb 0.8 oz)  Energy Calorie Requirements (kcal): 2133 kcal/day  Energy Need Method: [place](x 1.0 PAL 2/2 " obesity)  Protein Requirements:  g/day(0.8-1 g/kg)  Weight Used For Protein Calculations: 123.4 kg (272 lb 0.8 oz)  Fluid Requirements (mL): per MD or 1 mL/kcal     RDA Method (mL): 2133    Nutrition Prescription Ordered    Current Diet Order: Cardiac  Nutrition Order Comments: 1500mL FR    Evaluation of Received Nutrient/Fluid Intake    Comments: LBM 7/1  % Intake of Estimated Energy Needs: 75 - 100 %  % Meal Intake: 75 - 100 %    Nutrition Risk    Level of Risk/Frequency of Follow-up: low(1x/week)     Assessment and Plan    Nutrition Problem  Increased nutrient needs    Related to (etiology):   Physiological demands    Signs and Symptoms (as evidenced by):   ICD pocket site infection    Interventions (treatment strategy):  Collaboration with other providers    Nutrition Diagnosis Status:   New    Monitor and Evaluation    Food and Nutrient Intake: energy intake, food and beverage intake  Food and Nutrient Adminstration: diet order  Knowledge/Beliefs/Attitudes: food and nutrition knowledge/skill  Anthropometric Measurements: weight, weight change, body mass index  Biochemical Data, Medical Tests and Procedures: electrolyte and renal panel, gastrointestinal profile, glucose/endocrine profile, inflammatory profile, lipid profile  Nutrition-Focused Physical Findings: overall appearance     Nutrition Follow-Up    RD Follow-up?: Yes

## 2020-07-02 NOTE — ASSESSMENT & PLAN NOTE
BiV-Icd Medtronic (2014 Dr. Chiu), successful DFT at 35J on 10/18/2019. Multiple VT episodes,  generator change (BiV ICD -> dual ICD),  device revision (addition of a new RV/ICD lead) and another VF induction via ICD on 3/2020. Presented from device clinic with device erosion.     - Agree continue with broad spectrum antibiotics, and consult ID   - Two sets of blood culture- pending results to determine whether patient will need long term IV abx vs PO antibiotics.  - Continue current device setting and current antiarrythmic agents   - Plan to evaluate for device extraction in near future    - When antibiotics is concluded (per ID recommendations), will consider an alternative method of ICD implantation

## 2020-07-02 NOTE — PROGRESS NOTES
07/02/20 0446   Vital Signs   BP (!) 90/0   BP Location Left arm   BP Method Doppler   Patient Position Lying   Pt states baseline is around 86/0. Lucas CHAMBERLAIN made aware, no new orders at this time.

## 2020-07-02 NOTE — PLAN OF CARE
Recommendations  1. Continue cardiac diet as tolerated, with fluid restriction per MD.   2. RD to monitor.     Goals: Adequate PO intake to meet > 75% EEN/EPN by RD follow up  Nutrition Goal Status: new  Communication of RD Recs: other (comment)(POC)

## 2020-07-02 NOTE — PROGRESS NOTES
Pt aaox3 while sitting up in bed with wife at bedside.   Pt denies any needs at this time.  Encouraged pt to notify nurse if they have any questions, problems or concerns for LVAD coordinator.  Pt verbalized understanding and in agreement of plan. Will follow up with pt soon.

## 2020-07-02 NOTE — PT/OT/SLP PROGRESS
Occupational Therapy Screen and Discharge       Patient Name:  Tim Richards   MRN:  [id number]      Occupational therapy orders received and acknowledged. Tim Richards has a LVAD in place, implanted 3/8/2018, who presents with ICD pocket site infection. Upon entry, pt sitting in ruy in NAD. Pt reports he is at baseline mobility, independent with all mobility and ADLs. Pt reports he has been ambulating independently since admission, denies any dizziness, SOB, or recent falls. Pt denies need for acute skilled therapy intervention. Pt reported that when wife arrives, he will get dressed and go for a walk. Pt does not require further acute skilled therapy intervention. Discharge from OT services and re-consult if pt experiences a change in status. No billable units this date.         Ute Mejía, OT  7/2/2020

## 2020-07-02 NOTE — SUBJECTIVE & OBJECTIVE
Interval History: Patient doing well at bedside. Afebrile. Denies fevers, chills, chest pain, shortness of breath. Vitals stable.    ROS  Objective:     Vital Signs (Most Recent):  Temp: 98.4 °F (36.9 °C) (07/02/20 1216)  Pulse: 77 (07/02/20 1456)  Resp: 16 (07/02/20 1216)  BP: (!) 90/0 (07/02/20 1216)  SpO2: 98 % (07/02/20 1216) Vital Signs (24h Range):  Temp:  [97.5 °F (36.4 °C)-98.7 °F (37.1 °C)] 98.4 °F (36.9 °C)  Pulse:  [62-77] 77  Resp:  [16] 16  SpO2:  [91 %-98 %] 98 %  BP: (78-90)/(0) 90/0     Weight: 123.4 kg (272 lb 0.8 oz)  Body mass index is 35.89 kg/m².     SpO2: 98 %  O2 Device (Oxygen Therapy): room air    Physical Exam   Constitutional: He is oriented to person, place, and time. He appears well-developed and well-nourished. No distress.   HENT:   Head: Normocephalic.   Left Ear: External ear normal.   Eyes: Pupils are equal, round, and reactive to light. Right eye exhibits no discharge. Left eye exhibits no discharge.   Neck: Normal range of motion. No thyromegaly present.   Cardiovascular: Normal rate and regular rhythm. Exam reveals no friction rub.   Murmur heard.  Pulmonary/Chest: Effort normal and breath sounds normal. No respiratory distress.   Abdominal: Soft. Bowel sounds are normal. He exhibits distension. There is no abdominal tenderness.   Musculoskeletal: Normal range of motion.         General: No edema.   Neurological: He is alert and oriented to person, place, and time.   Skin: Rash noted. There is erythema.           Significant Labs:   CMP:   Recent Labs   Lab 07/02/20  0354      K 3.5      CO2 25   GLU 81   BUN 13   CREATININE 1.9*   CALCIUM 9.3   PROT 7.1   ALBUMIN 3.5   BILITOT 0.6   ALKPHOS 86   AST 31   ALT 17   ANIONGAP 10   ESTGFRAFRICA 45.5*   EGFRNONAA 39.4*    and CBC:   Recent Labs   Lab 07/02/20  0354   WBC 5.60   HGB 12.5*   HCT 41.7

## 2020-07-02 NOTE — SUBJECTIVE & OBJECTIVE
Interval History: no events overnight. Patient states he feels well and has no complaints.    Continuous Infusions:  Scheduled Meds:   allopurinoL  100 mg Oral Daily    amiodarone  200 mg Oral Daily    amLODIPine  10 mg Oral Daily    aspirin  81 mg Oral Daily    cefTRIAXone (ROCEPHIN) 2 g in dextrose 5 % 50 mL IVPB (ready to mix system)  2 g Intravenous Q24H    doxazosin  8 mg Oral Q12H    ferrous gluconate  324 mg Oral Daily with lunch    fluticasone propionate  2 spray Each Nostril Daily    guanFACINE  1 mg Oral QHS    pantoprazole  40 mg Oral Daily with lunch    paroxetine  10 mg Oral QAM    potassium chloride SA  20 mEq Oral BID    spironolactone  25 mg Oral Daily with lunch    torsemide  40 mg Oral Daily    vancomycin (VANCOCIN) IVPB  1,000 mg Intravenous Q12H    vitamin D  1,000 Units Oral Daily    warfarin  2.5 mg Oral Daily    warfarin  7.5 mg Oral Once     PRN Meds:Pharmacy to dose Vancomycin consult **AND** vancomycin - pharmacy to dose, zolpidem    Review of patient's allergies indicates:   Allergen Reactions    Lisinopril Anaphylaxis    Hydralazine analogues      Chronic constipation, impotence, dizziness     Objective:     Vital Signs (Most Recent):  Temp: 98.6 °F (37 °C) (07/02/20 0806)  Pulse: 71 (07/02/20 1010)  Resp: 16 (07/02/20 0806)  BP: (!) 82/0 (07/02/20 0806)  SpO2: 95 % (07/02/20 0806) Vital Signs (24h Range):  Temp:  [97.5 °F (36.4 °C)-98.7 °F (37.1 °C)] 98.6 °F (37 °C)  Pulse:  [62-73] 71  Resp:  [16] 16  SpO2:  [91 %-97 %] 95 %  BP: (78-90)/(0) 82/0     Patient Vitals for the past 72 hrs (Last 3 readings):   Weight   07/02/20 0500 123.4 kg (272 lb 0.8 oz)   07/01/20 1400 125.5 kg (276 lb 10.8 oz)     Body mass index is 35.89 kg/m².      Intake/Output Summary (Last 24 hours) at 7/2/2020 1044  Last data filed at 7/2/2020 0826  Gross per 24 hour   Intake 440 ml   Output 750 ml   Net -310 ml       Hemodynamic Parameters:       Telemetry: no events reported    Physical  Exam  Constitutional:       Appearance: Normal appearance.   HENT:      Head: Normocephalic and atraumatic.   Neck:      Musculoskeletal: Normal range of motion.   Cardiovascular:      Comments: Smooth LVAD hum  Skin:     General: Skin is warm and dry.      Comments: Dressing over wound over ICD pocket site.   Neurological:      Mental Status: He is alert.         Significant Labs:  CBC:  Recent Labs   Lab 07/02/20 0354   WBC 5.60   RBC 4.68   HGB 12.5*   HCT 41.7      MCV 89   MCH 26.7*   MCHC 30.0*     BNP:  No results for input(s): BNP in the last 168 hours.    Invalid input(s): BNPTRIAGELBLO  CMP:  Recent Labs   Lab 07/02/20 0354   GLU 81   CALCIUM 9.3      K 3.5   CO2 25      BUN 13   CREATININE 1.9*      Coagulation:   Recent Labs   Lab 07/02/20 0354   INR 1.4*   APTT 29.6     LDH:  Recent Labs   Lab 07/02/20 0354   *     Microbiology:  Microbiology Results (last 7 days)     Procedure Component Value Units Date/Time    Aerobic culture [[id number]] Collected: 07/01/20 1741    Order Status: Completed Specimen: Skin from Abdomen Updated: 07/02/20 0822     Aerobic Bacterial Culture No growth    Culture, Anaerobe [[id number]] Collected: 07/01/20 1741    Order Status: Completed Specimen: Skin from Abdomen Updated: 07/02/20 0733     Anaerobic Culture Culture in progress    Narrative:      Drive line site    Culture, Anaerobe [[id number]] Collected: 07/01/20 2153    Order Status: Sent Specimen: Wound from Abdomen Updated: 07/01/20 2212    Aerobic culture [[id number]] Collected: 07/01/20 2153    Order Status: Sent Specimen: Wound from Abdomen Updated: 07/01/20 2212    Culture, Anaerobe [[id number]]     Order Status: Canceled Specimen: Skin from Abdomen     Aerobic culture [[id number]]     Order Status: Canceled Specimen: Skin from Abdomen     Culture, Anaerobe [[id number]] Collected: 07/01/20 1741    Order Status: Sent Specimen: Incision site from Chest, Left Updated: 07/01/20 1830    Aerobic  culture [[id number]] Collected: 07/01/20 9476    Order Status: Canceled Specimen: Incision site from Chest, Left           I have reviewed all pertinent labs within the past 24 hours.    Estimated Creatinine Clearance: 61.9 mL/min (A) (based on SCr of 1.9 mg/dL (H)).

## 2020-07-02 NOTE — CONSULTS
[facility]  Infectious Disease  Consult Note    Patient Name: Tim Richards  MRN: [id number]  Admission Date: 7/1/2020  Hospital Length of Stay: 1 days  Attending Physician: Guerda Bautista MD  Primary Care Provider: Power Connors MD     Isolation Status: No active isolations    Patient information was obtained from patient and past medical records.      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 Continue ceftriaxone and vancomycin at this time. May need PICC placement and continued IV abx at home vs PO abx, depending on wound cx. Send second blood cx. If positive, consider BRITTANY. Source control per EP plan for debridement/generator change/lead extraction as indicated.    Thank you for your consult. I will follow-up with patient. Please contact us if you have any additional questions.    Lory Linda MD  Infectious Disease  [facility]    Subjective:     Principal Problem: <principal problem not specified>    HPI: Tim Richards is a 54 y.o. male with past medical history of DCM, CHF stage D s/p HM2, VT with ICD and lead replacement on 3/9/2020 who presents with an ICD pocket infection. He reports surgical glue was used with the lead replacement, and the incision never fully closed. He had a previous infection since then in April and was treated with oral abx. Cultures negative at that time. He reports complete resolution of the infection after treatment at that time. However, over the past week he has noticed yellow non-odorous drainage from the site. He was prescribed oral doxycycline on 6/29/2020 and advised to get admitted for further treatment. He did not think it was  necessary at that time and continued the doxycycline without improvement. He denies fever, aches, or chills. On 7/1/2020 evening, he was started on IV vancomycin and cefriaxone and site cultures obtained.     Past Medical History:   Diagnosis Date    Anticoagulant long-term use     CHF (congestive heart failure)     Dilated cardiomyopathy 1/10/2018    Disorder of kidney and ureter     CKD    Encounter for blood transfusion     Gout     HTN (hypertension)     Thyroid disease     Ventricular tachycardia (paroxysmal)        Past Surgical History:   Procedure Laterality Date    CARDIAC CATHETERIZATION  Dec. 2012    CARDIAC DEFIBRILLATOR PLACEMENT Left     CRRT-D    COLONOSCOPY N/A 3/6/2018    Procedure: COLONOSCOPY;  Surgeon: Alonso Bone MD;  Location: [facility] (2ND FLR);  Service: Endoscopy;  Laterality: N/A;    COLONOSCOPY N/A 7/17/2019    Procedure: COLONOSCOPY;  Surgeon: Blane Valdez MD;  Location: [facility] ENDO (2ND FLR);  Service: Endoscopy;  Laterality: N/A;    COLONOSCOPY N/A 7/18/2019    Procedure: COLONOSCOPY;  Surgeon: Blane Valdez MD;  Location: [facility] ENDO (2ND FLR);  Service: Endoscopy;  Laterality: N/A;    ESOPHAGOGASTRODUODENOSCOPY N/A 7/17/2019    Procedure: EGD (ESOPHAGOGASTRODUODENOSCOPY);  Surgeon: Blane Valdez MD;  Location: [facility] (2ND FLR);  Service: Endoscopy;  Laterality: N/A;    ESOPHAGOGASTRODUODENOSCOPY N/A 7/18/2019    Procedure: EGD (ESOPHAGOGASTRODUODENOSCOPY);  Surgeon: Blane Valdez MD;  Location: [facility] (2ND FLR);  Service: Endoscopy;  Laterality: N/A;    NONINVASIVE CARDIAC ELECTROPHYSIOLOGY STUDY N/A 10/18/2019    Procedure: CARDIAC ELECTROPHYSIOLOGY STUDY, NONINVASIVE;  Surgeon: Raz Wagner MD;  Location: [facility] EP LAB;  Service: Cardiology;  Laterality: N/A;  VT, DFTs, MDT CRTD in situ, LVAD, anes, MB, 3098    REPLACEMENT OF IMPLANTABLE CARDIOVERTER-DEFIBRILLATOR (ICD) GENERATOR N/A 3/9/2020    Procedure: REPLACEMENT, ICD GENERATOR;  Surgeon: Harry Yun MD;   Location: [facility] EP LAB;  Service: Cardiology;  Laterality: N/A;  VT, ICD Gen Change and Lead Revision, MDT, MAC, DM,3 Prep    REVISION OF IMPLANTABLE CARDIOVERTER-DEFIBRILLATOR (ICD) ELECTRODE LEAD PLACEMENT N/A 3/9/2020    Procedure: REVISION, INSERTION, ELECTRODE LEAD, ICD;  Surgeon: Harry Yun MD;  Location: [facility] EP LAB;  Service: Cardiology;  Laterality: N/A;  VT, ICD Gen Change and Lead Revision, MDT, MAC, DM,3 Prep    TREATMENT OF CARDIAC ARRHYTHMIA  10/18/2019    Procedure: Cardioversion or Defibrillation;  Surgeon: Raz Wagner MD;  Location: [facility] EP LAB;  Service: Cardiology;;       Review of patient's allergies indicates:   Allergen Reactions    Lisinopril Anaphylaxis    Hydralazine analogues      Chronic constipation, impotence, dizziness       Medications:  Medications Prior to Admission   Medication Sig    allopurinol (ZYLOPRIM) 100 MG tablet TAKE 1 TABLET BY MOUTH EVERY DAY (Patient taking differently: Take 100 mg by mouth nightly. )    amiodarone (PACERONE) 400 MG tablet Take 1 tablet (400 mg total) by mouth once daily. (Patient taking differently: Take 200 mg by mouth once daily. )    amLODIPine (NORVASC) 10 MG tablet Take 1 tablet (10 mg total) by mouth once daily.    doxazosin (CARDURA) 8 MG Tab Take 1 tablet (8 mg total) by mouth every 12 (twelve) hours.    doxycycline (VIBRAMYCIN) 100 MG Cap Take 1 capsule (100 mg total) by mouth 2 (two) times daily. for 10 days    ferrous gluconate 324 mg (37.5 mg iron) Tab tablet Take 1 tablet (324 mg total) by mouth daily with breakfast. (Patient taking differently: Take 324 mg by mouth daily with lunch. )    guanFACINE (TENEX) 1 MG Tab Take 1 tablet (1 mg total) by mouth every evening.    pantoprazole (PROTONIX) 40 MG tablet TAKE 1 TABLET (40 MG TOTAL) BY MOUTH ONCE DAILY. (Patient taking differently: Take 40 mg by mouth daily with lunch. )    paroxetine (PAXIL) 10 MG tablet Take 1 tablet (10 mg total) by mouth every morning.     potassium chloride SA (K-DUR,KLOR-CON) 20 MEQ tablet Take 20 mEq by mouth 2 (two) times daily.    predniSONE (DELTASONE) 2.5 MG tablet Take 3 tablets (7.5 mg total) by mouth once daily for 10 days, THEN 2 tablets (5 mg total) once daily for 10 days, THEN 1 tablet (2.5 mg total) once daily for 15 days. Then discontinue..    spironolactone (ALDACTONE) 25 MG tablet Take 1 tablet (25 mg total) by mouth once daily. (Patient taking differently: Take 25 mg by mouth daily with lunch. )    torsemide (DEMADEX) 20 MG Tab TAKE 2 TABS BY MOUTH EVERY MORNING AND 1 TAB EVERY EVENING FOR 3 DAYS THEN 2 TABS DAILY THEREAFTER (Patient taking differently: Take 40 mg by mouth once daily. TAKE 2 TABS BY MOUTH EVERY MORNING AND 1 TAB EVERY EVENING FOR 3 DAYS THEN 2 TABS DAILY THEREAFTER)    vitamin D (VITAMIN D3) 1000 units Tab Take 1,000 Units by mouth once daily.    zolpidem (AMBIEN) 10 mg Tab Take 1 tablet (10 mg total) by mouth nightly as needed.    aspirin (ECOTRIN) 81 MG EC tablet Take 1 tablet (81 mg total) by mouth once daily.    fluticasone propionate (FLONASE) 50 mcg/actuation nasal spray 2 sprays (100 mcg total) by Each Nostril route once daily.    sildenafiL (VIAGRA) 100 MG tablet Take 1 tablet (100 mg total) by mouth daily as needed for Erectile Dysfunction.    warfarin (COUMADIN) 5 MG tablet Take 5mg orally daily except 2.5mg orally on Monday / Fridays (Patient taking differently: Take 2.5 mg by mouth. Take 5mg orally daily except 2.5mg orally on Wednesdays and Saturdays)     Antibiotics (From admission, onward)    Start     Stop Route Frequency Ordered    07/02/20 0930  vancomycin in dextrose 5 % 1 gram/250 mL IVPB 1,000 mg      -- IV Every 12 hours (non-standard times) 07/02/20 0823    07/01/20 1800  cefTRIAXone (ROCEPHIN) 2 g in dextrose 5 % 50 mL IVPB (ready to mix system)  (CEFTRIAXONE (ROCEPHIN) IM WITH LIDOCAINE)      -- IV Every 24 hours 07/01/20 1643    07/01/20 1742  vancomycin - pharmacy to dose   (vancomycin IVPB)      -- IV pharmacy to manage frequency 20 1643        Antifungals (From admission, onward)    None        Antivirals (From admission, onward)    None           Immunization History   Administered Date(s) Administered    Hepatitis A / Hepatitis B 2018    Influenza 2014    Influenza - Quadrivalent - PF (6 months and older) 2015, 2016, 10/05/2017    Influenza - Trivalent - PF (PED) 2014    Pneumococcal Conjugate - 13 Valent 2014    Pneumococcal Polysaccharide - 23 Valent 10/01/2015, 2016    Tdap 2018       Family History     Problem Relation (Age of Onset)    Cancer Sister (54)    Coronary artery disease Father    Diabetes Father    Heart disease Father    Hypertension Father    No Known Problems Mother, Brother        Social History     Socioeconomic History    Marital status:      Spouse name: Not on file    Number of children: Not on file    Years of education: Not on file    Highest education level: Not on file   Occupational History     Employer: [employer]    Social Needs    Financial resource strain: Not hard at all    Food insecurity     Worry: Never true     Inability: Never true    Transportation needs     Medical: No     Non-medical: No   Tobacco Use    Smoking status: Former Smoker     Packs/day: 1.00     Years: 31.00     Pack years: 31.00     Types: Cigarettes     Quit date: 2018     Years since quittin.4    Smokeless tobacco: Never Used    Tobacco comment: pt is quiting on his own - pt stated not qualified for program;  pt  quit on his own   Substance and Sexual Activity    Alcohol use: No     Alcohol/week: 0.0 standard drinks     Frequency: Never     Drinks per session: Patient refused     Binge frequency: Never     Comment: one fifth of gin or rum/week    Drug use: No    Sexual activity: Yes     Partners: Female     Birth control/protection: None     Comment: 10/5/17  with same  partner 7 years    Lifestyle    Physical activity     Days per week: Not on file     Minutes per session: 60 min    Stress: Very much   Relationships    Social connections     Talks on phone: More than three times a week     Gets together: More than three times a week     Attends Hindu service: Not on file     Active member of club or organization: Yes     Attends meetings of clubs or organizations: More than 4 times per year     Relationship status:    Other Topics Concern    Not on file   Social History Narrative    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   HENT: Negative for mouth sores and sore throat.    Eyes: Negative for visual disturbance.   Respiratory: Negative for shortness of breath and wheezing.    Cardiovascular: Negative for chest pain.   Gastrointestinal: Negative for abdominal distention, abdominal pain, constipation, diarrhea, nausea and vomiting.   Genitourinary: Negative for dysuria.   Musculoskeletal: Negative for myalgias.   Skin: Positive for wound. Negative for color change.        Yellow non-odorous draining from ICD wound   Neurological: Negative for dizziness and headaches.   Psychiatric/Behavioral: Negative for behavioral problems and confusion.     Objective:     Vital Signs (Most Recent):  Temp: 98.6 °F (37 °C) (07/02/20 0806)  Pulse: 71 (07/02/20 1010)  Resp: 16 (07/02/20 0806)  BP: (!) 82/0 (07/02/20 0806)  SpO2: 95 % (07/02/20 0806) Vital Signs (24h Range):  Temp:  [97.5 °F (36.4 °C)-98.7 °F (37.1 °C)] 98.6 °F (37 °C)  Pulse:  [62-73] 71  Resp:  [16] 16  SpO2:  [91 %-97 %] 95 %  BP: (78-90)/(0) 82/0     Weight: 123.4 kg (272 lb 0.8 oz)  Body mass index is 35.89 kg/m².    Estimated Creatinine Clearance: 61.9 mL/min (A) (based on SCr of 1.9 mg/dL (H)).    Physical Exam  Vitals signs reviewed.   Constitutional:       General: He is not in acute distress.     Appearance: Normal appearance. He is  obese. He is not toxic-appearing.   HENT:      Head: Normocephalic and atraumatic.      Mouth/Throat:      Mouth: Mucous membranes are moist.      Pharynx: Oropharynx is clear.   Eyes:      General: No scleral icterus.     Extraocular Movements: Extraocular movements intact.   Neck:      Musculoskeletal: Neck supple.   Cardiovascular:      Comments: VAD hum  Pulmonary:      Effort: Pulmonary effort is normal.      Breath sounds: Normal breath sounds.   Abdominal:      General: Bowel sounds are normal.      Palpations: Abdomen is soft.      Tenderness: There is no abdominal tenderness.   Lymphadenopathy:      Cervical: No cervical adenopathy.   Skin:     General: Skin is warm and dry.      Capillary Refill: Capillary refill takes less than 2 seconds.      Findings: Lesion present. No erythema.      Comments: ICD wound dressing CDI and surrounding area warm to touch   Neurological:      General: No focal deficit present.      Mental Status: He is alert and oriented to person, place, and time. Mental status is at baseline.   Psychiatric:         Mood and Affect: Mood normal.         Behavior: Behavior normal.         Thought Content: Thought content normal.         Judgment: Judgment normal.         Significant Labs:   CBC:   Recent Labs   Lab 07/02/20  0354   WBC 5.60   HGB 12.5*   HCT 41.7        CMP:   Recent Labs   Lab 07/02/20  0354      K 3.5      CO2 25   GLU 81   BUN 13   CREATININE 1.9*   CALCIUM 9.3   ANIONGAP 10   EGFRNONAA 39.4*     Wound Culture:   Recent Labs   Lab 07/01/20  1741   LABAERO No growth       Significant Imaging: I have reviewed all pertinent imaging results/findings within the past 24 hours.

## 2020-07-02 NOTE — TELEPHONE ENCOUNTER
Spoke with patient's wife concerned about doctor taking him of a medication. Message was given to Dr Piedra. Dr Piedra will give her a call.

## 2020-07-02 NOTE — PROGRESS NOTES
[facility]  Heart Transplant  Progress Note    Patient Name: Tim Richards  MRN: [id number]  Admission Date: 7/1/2020  Hospital Length of Stay: 1 days  Attending Physician: Guerda Bautista MD  Primary Care Provider: Power Connors MD  Principal Problem:<principal problem not specified>    Subjective:     Interval History: no events overnight. Patient states he feels well and has no complaints.    Continuous Infusions:  Scheduled Meds:   allopurinoL  100 mg Oral Daily    amiodarone  200 mg Oral Daily    amLODIPine  10 mg Oral Daily    aspirin  81 mg Oral Daily    cefTRIAXone (ROCEPHIN) 2 g in dextrose 5 % 50 mL IVPB (ready to mix system)  2 g Intravenous Q24H    doxazosin  8 mg Oral Q12H    ferrous gluconate  324 mg Oral Daily with lunch    fluticasone propionate  2 spray Each Nostril Daily    guanFACINE  1 mg Oral QHS    pantoprazole  40 mg Oral Daily with lunch    paroxetine  10 mg Oral QAM    potassium chloride SA  20 mEq Oral BID    spironolactone  25 mg Oral Daily with lunch    torsemide  40 mg Oral Daily    vancomycin (VANCOCIN) IVPB  1,000 mg Intravenous Q12H    vitamin D  1,000 Units Oral Daily    warfarin  2.5 mg Oral Daily    warfarin  7.5 mg Oral Once     PRN Meds:Pharmacy to dose Vancomycin consult **AND** vancomycin - pharmacy to dose, zolpidem    Review of patient's allergies indicates:   Allergen Reactions    Lisinopril Anaphylaxis    Hydralazine analogues      Chronic constipation, impotence, dizziness     Objective:     Vital Signs (Most Recent):  Temp: 98.6 °F (37 °C) (07/02/20 0806)  Pulse: 71 (07/02/20 1010)  Resp: 16 (07/02/20 0806)  BP: (!) 82/0 (07/02/20 0806)  SpO2: 95 % (07/02/20 0806) Vital Signs (24h Range):  Temp:  [97.5 °F (36.4 °C)-98.7 °F (37.1 °C)] 98.6 °F (37 °C)  Pulse:  [62-73] 71  Resp:  [16] 16  SpO2:  [91 %-97 %] 95 %  BP: (78-90)/(0) 82/0     Patient Vitals for the past 72 hrs (Last 3 readings):   Weight   07/02/20 0500 123.4 kg  (272 lb 0.8 oz)   07/01/20 1400 125.5 kg (276 lb 10.8 oz)     Body mass index is 35.89 kg/m².      Intake/Output Summary (Last 24 hours) at 7/2/2020 1044  Last data filed at 7/2/2020 0826  Gross per 24 hour   Intake 440 ml   Output 750 ml   Net -310 ml       Hemodynamic Parameters:       Telemetry: no events reported    Physical Exam  Constitutional:       Appearance: Normal appearance.   HENT:      Head: Normocephalic and atraumatic.   Neck:      Musculoskeletal: Normal range of motion.   Cardiovascular:      Comments: Smooth LVAD hum  Skin:     General: Skin is warm and dry.      Comments: Dressing over wound over ICD pocket site.   Neurological:      Mental Status: He is alert.         Significant Labs:  CBC:  Recent Labs   Lab 07/02/20 0354   WBC 5.60   RBC 4.68   HGB 12.5*   HCT 41.7      MCV 89   MCH 26.7*   MCHC 30.0*     BNP:  No results for input(s): BNP in the last 168 hours.    Invalid input(s): BNPTRIAGELBLO  CMP:  Recent Labs   Lab 07/02/20  0354   GLU 81   CALCIUM 9.3      K 3.5   CO2 25      BUN 13   CREATININE 1.9*      Coagulation:   Recent Labs   Lab 07/02/20 0354   INR 1.4*   APTT 29.6     LDH:  Recent Labs   Lab 07/02/20  0354   *     Microbiology:  Microbiology Results (last 7 days)     Procedure Component Value Units Date/Time    Aerobic culture [[id number]] Collected: 07/01/20 1741    Order Status: Completed Specimen: Skin from Abdomen Updated: 07/02/20 0822     Aerobic Bacterial Culture No growth    Culture, Anaerobe [[id number]] Collected: 07/01/20 1741    Order Status: Completed Specimen: Skin from Abdomen Updated: 07/02/20 0733     Anaerobic Culture Culture in progress    Narrative:      Drive line site    Culture, Anaerobe [[id number]] Collected: 07/01/20 2153    Order Status: Sent Specimen: Wound from Abdomen Updated: 07/01/20 2212    Aerobic culture [[id number]] Collected: 07/01/20 2153    Order Status: Sent Specimen: Wound from Abdomen Updated: 07/01/20 2212     Culture, Anaerobe [[id number]]     Order Status: Canceled Specimen: Skin from Abdomen     Aerobic culture [[id number]]     Order Status: Canceled Specimen: Skin from Abdomen     Culture, Anaerobe [[id number]] Collected: 07/01/20 1741    Order Status: Sent Specimen: Incision site from Chest, Left Updated: 07/01/20 1830    Aerobic culture [[id number]] Collected: 07/01/20 1741    Order Status: Canceled Specimen: Incision site from Chest, Left           I have reviewed all pertinent labs within the past 24 hours.    Estimated Creatinine Clearance: 61.9 mL/min (A) (based on SCr of 1.9 mg/dL (H)).          Assessment and Plan:     55 yo AAM with DCM, HBP, CHF stage D s/p HM 2, ICD lead replacement 03/09/2020, hyperthyroidism,  sent in from clinic with a ICD pocket site infection. Patient wife reports yellow pus oozing from site this past week. Was prescribed antibiotics on Monday 6/29/20, and advised to come get admitted that day, but patient reported he didn't think it was necessary at that time. Denies fever, but reports intermittent nausea and chills, and Left side of Left hand tingling.      VAD parameters at baseline.  Pt speed decreased to 9800 rpms from 10,000 on 06/12/2020    ICD device check - 07/01/2020 --. Device interrogation revealed HV x 1 on 6/27/20 @ 3:41 pm for MMVT, Type 2 break.,CL-188 bpm. Patient reported sitting in car bending over when it occurred, not doing anything strenuous. Patient denies LOC. Patient reports overall feeling palpitations, tired, no energy, and COLEMAN, even prior to HV therapy.          ICD (implantable cardioverter-defibrillator) infection  - presented with device pocket infection  - has been on oral doxycycline at home for the last 1 week with no improvement  - denies any fever but does admit to fatigue and chills   - WBC 5.6 , Hb 12.5  - ICD device check - 07/01/2020 --. Device interrogation revealed HV x 1 on 6/27/20 @ 3:41 pm for MMVT, Type 2 break.,CL-188 bpm.    Plan   - ID  consulted who recommended IV vancomycin and IV ceftriaxone. C/w with recx  - will f/u blood and site cultures and based on the sensitivities will narrow the antibitoics  - EP informed who will plan an outpatient generator change and pocket site debridement.  - patient will stay over the weekend o get IV antibiotics and get the final results of his culture.       LVAD (left ventricular assist device) present  - LDH on admission 590 was 447 a month ago  - INR 1.4 . Goal of 2-3   - given history of GI bleed in 2019 will just boost tonight with an extra dose of coumadin ( 7.5 mg)   - will get LFT's  And haptoglobulin    - patient denies any dark urine and no obvious scleral icterus on exam  - drive site clean and dry but will send site cultures.        Procedure: Device Interrogation Including analysis of device parameters  Current Settings: Ventricular Assist Device  Review of device function is stable/unstable stable    TXP LVAD INTERROGATIONS 7/2/2020 7/2/2020 7/2/2020 7/1/2020 7/1/2020 7/1/2020 6/3/2020   Type HeartMate II HeartMate II HeartMate II HeartMate II HeartMate II HeartMate II -   Flow 4.9 5.5 5.0 4.9 4.8 5.3 -   Speed 9800 980 9800 9800 9800 9800 -   PI 4 4.5 3.8 3.2 4.0 3.8 -   Power (Jackson) 6.1 6.1 6.1 6.1 6.0 6.4 -   LSL 9400 - - - - 9400 -   Pulsatility No Pulse - - - - - No Pulse       VT (ventricular tachycardia)  - device check showed a VT episode with shock on 06/27  - patient denies any LOC   - c/w amiodarone 200 mg daily.   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

## 2020-07-02 NOTE — PROGRESS NOTES
Admit Note    Patient is a 54 y.o.  male, admitted for infection involving implantable cardioverter-defibrillator (ICD).    Pt received his LVAD on 3/8/18.       Patient admitted to [facility] on 7/1/2020.  Pt presents as AAO x4, calm, cooperative, and asking and answering questions appropriately. Pts caregiver is not at bedside this morning.     Household/Family Systems     Patient resides with patient's spouse and adult step son, at    [address]  [place] [address].     Support system includes spouse and adult step son.  Pt does not have dependents that are in need of being cared for.     Patients primary caregiver is self.  Pt's cell:  [phone]  Pt's work: [phone]    Emergency contact:   Kelly Richards (spouse) [phone]    During admission, patient's caregiver plans to stay at home.  Confirmed patient and patients caregivers do have access to reliable transportation.    Cognitive Status/Learning     Patient reports reading ability as college and states patient does not have difficulty with reading, writing, seeing, hearing, comprehension, learning and memory.    Patient reports patient learns best by reading.   Needed: no.   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 He was working from home during early stages of Covid-19 stay-at-home order, but is now back to work. Pt is noticing that his stamina at work is declining severely, and he is considering seeking disability. SW made referral to Leslie COSTELLO to answer Pts general questions about the process in order to help him make his decision.     Adherence     Patient reports a high level of adherence to health care regiment. Pt denies missing scheduled doses of medication or scheduled medical appointments. Pt notes improvement in following his diet restrictions, associating this with no longer  taking prednisone. Adherence counseling and education provided. Patient verbalizes understanding.    Substance Use    Patient reports the following substance usage.    Tobacco: no current use. Pt quit smoking 2019  Alcohol: none. No alcohol since 2018  Illicit Drugs/Non-prescribed Medications: none.  Patient states clear understanding of the potential impact of substance use.  Substance abstinence/cessation counseling, education and resources provided and reviewed.     Services Utilizing/ADLS    Infusion Service: Prior to admission, patient utilizing? No - Patient voices no preference for infusion company should he require them for IV Abx.  Home Health: Prior to admission, patient utilizing? No.  Pt plans on going to [facility] for out pt labs.  DME: Prior to admission, yes, cane-does not currently use  Pulmonary/Cardiac Rehab: Prior to admission, no  Dialysis:  Prior to admission, no  Transplant Specialty Pharmacy:  Prior to admission, no    Prior to admission, patient reports patient was independent with ADLS and was driving. Pt's spouse also drives.  Patient reports patient is able to care for himself at this time .  Patient indicates a willingness to care for self once medically cleared to do so.    Insurance/Medications     Payor/Plan Subscr  Sex Relation Sub. Ins. ID Effective Group Num   1. AETNA - AETNA* JESUS PHELPS* 1965 Male  [id number]                                   [address]   2. [employer] - SMO* JESUS PHELPS* 1965 Male  [id number]                                    [address]          Primary Insurance (for UNOS reporting): Private insurance  Secondary Insurance (for UNOS reporting): none    Patient reports patient is able to obtain and afford medications at this time and at time of discharge.    Advance Care Planning     Living Will/Healthcare Power of     Patient states patient does not have a LW  and/or HCPA.   provided education regarding LW and HCPA and the completion of forms. Pt denies desire to fill these out at this time, feeling his spouse will be able to make decisions on his behalf, and he does not wish to elect secondary or tertiary HCPAs. SW remains available.      Coping/Mental Health    Patient reports he is coping adequately with with family support. He denies mental health concerns at this time.    Discharge Planning    At time of discharge, patient plans to return to own home under the care of self and spouse.  Patients spouse will transport patient.  Per rounds today, expected discharge date is unknown. Patient and patients caregiver verbalize understanding and are involved in treatment planning and discharge process.    Additional Concerns    Patient is being followed for needs, education, resources, information, emotional support, supportive counseling and for supportive and skilled discharge plan of care.   providing ongoing psychosocial support, education, resources and d/c planning as needed. SW remains available. Pt denies additional needs and/or concerns at this time. Patient verbalizes understanding and agreement with information reviewed, social work availability and how to access available resources as needed.

## 2020-07-02 NOTE — ASSESSMENT & PLAN NOTE
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 Continue ceftriaxone and vancomycin at this time. May need PICC placement and continued IV abx at home vs PO abx, depending on wound cx. Send second blood cx. If positive, consider BRITTANY. Source control per EP plan for debridement/generator change/lead extraction as indicated.

## 2020-07-02 NOTE — PROGRESS NOTES
Pharmacokinetic Initial Assessment: IV Vancomycin    Assessment/Plan:    Patient given initial 1750mg dose yesterday evening. Will continue vancomycin at lower nomogram prescribed dose of 1000mg Q12h given obesity starting this AM. Repeat trough prior to 7/3 dose at 9:00 AM. Goal is 10-20 given empiric therapy for ICD pocket infection with no systemic illness.     Thank you for the consult,   Phan Leal       Patient brief summary:  Tim Richards is a 54 y.o. male initiated on antimicrobial therapy with IV Vancomycin for treatment of suspected skin & soft tissue infection    Drug Allergies:   Review of patient's allergies indicates:   Allergen Reactions    Lisinopril Anaphylaxis    Hydralazine analogues      Chronic constipation, impotence, dizziness       Actual Body Weight:   123.4 kg     Renal Function:   Estimated Creatinine Clearance: 61.9 mL/min (A) (based on SCr of 1.9 mg/dL (H)).,     Dialysis Method (if applicable):  N/A    CBC (last 72 hours):  Recent Labs   Lab Result Units 07/02/20  0354   WBC K/uL 5.60   Hemoglobin g/dL 12.5*   Hematocrit % 41.7   Platelets K/uL 223   Gran% % 66.6   Lymph% % 15.7*   Mono% % 14.5   Eosinophil% % 2.0   Basophil% % 0.5   Differential Method  Automated       Metabolic Panel (last 72 hours):  Recent Labs   Lab Result Units 07/02/20  0354   Sodium mmol/L 138   Potassium mmol/L 3.5   Chloride mmol/L 103   CO2 mmol/L 25   Glucose mg/dL 81   BUN, Bld mg/dL 13   Creatinine mg/dL 1.9*   Magnesium mg/dL 2.0   Phosphorus mg/dL 3.3       Drug levels (last 3 results):  No results for input(s): VANCOMYCINRA, VANCOMYCINPE, VANCOMYCINTR in the last 72 hours.    Microbiologic Results:  Microbiology Results (last 7 days)     Procedure Component Value Units Date/Time    Aerobic culture [[id number]] Collected: 07/01/20 1741    Order Status: Completed Specimen: Skin from Abdomen Updated: 07/02/20 0822     Aerobic Bacterial Culture No growth    Culture, Anaerobe [[id number]] Collected:  07/01/20 1741    Order Status: Completed Specimen: Skin from Abdomen Updated: 07/02/20 0733     Anaerobic Culture Culture in progress    Narrative:      Drive line site    Culture, Anaerobe [[id number]] Collected: 07/01/20 2153    Order Status: Sent Specimen: Wound from Abdomen Updated: 07/01/20 2212    Aerobic culture [[id number]] Collected: 07/01/20 2153    Order Status: Sent Specimen: Wound from Abdomen Updated: 07/01/20 2212    Culture, Anaerobe [[id number]]     Order Status: Canceled Specimen: Skin from Abdomen     Aerobic culture [[id number]]     Order Status: Canceled Specimen: Skin from Abdomen     Culture, Anaerobe [[id number]] Collected: 07/01/20 1741    Order Status: Sent Specimen: Incision site from Chest, Left Updated: 07/01/20 1830    Aerobic culture [[id number]] Collected: 07/01/20 1741    Order Status: Canceled Specimen: Incision site from Chest, Left

## 2020-07-02 NOTE — SUBJECTIVE & OBJECTIVE
Past Medical History:   Diagnosis Date    Anticoagulant long-term use     CHF (congestive heart failure)     Dilated cardiomyopathy 1/10/2018    Disorder of kidney and ureter     CKD    Encounter for blood transfusion     Gout     HTN (hypertension)     Thyroid disease     Ventricular tachycardia (paroxysmal)        Past Surgical History:   Procedure Laterality Date    CARDIAC CATHETERIZATION  Dec. 2012    CARDIAC DEFIBRILLATOR PLACEMENT Left     CRRT-D    COLONOSCOPY N/A 3/6/2018    Procedure: COLONOSCOPY;  Surgeon: Alonso Bone MD;  Location: [facility] ENDO (2ND FLR);  Service: Endoscopy;  Laterality: N/A;    COLONOSCOPY N/A 7/17/2019    Procedure: COLONOSCOPY;  Surgeon: Blane Valdez MD;  Location: [facility] ENDO (2ND FLR);  Service: Endoscopy;  Laterality: N/A;    COLONOSCOPY N/A 7/18/2019    Procedure: COLONOSCOPY;  Surgeon: Blane Valdez MD;  Location: [facility] ENDO (2ND FLR);  Service: Endoscopy;  Laterality: N/A;    ESOPHAGOGASTRODUODENOSCOPY N/A 7/17/2019    Procedure: EGD (ESOPHAGOGASTRODUODENOSCOPY);  Surgeon: Blane Valdez MD;  Location: [facility] ENDO (2ND FLR);  Service: Endoscopy;  Laterality: N/A;    ESOPHAGOGASTRODUODENOSCOPY N/A 7/18/2019    Procedure: EGD (ESOPHAGOGASTRODUODENOSCOPY);  Surgeon: Blane Valdez MD;  Location: [facility] ENDO (2ND FLR);  Service: Endoscopy;  Laterality: N/A;    NONINVASIVE CARDIAC ELECTROPHYSIOLOGY STUDY N/A 10/18/2019    Procedure: CARDIAC ELECTROPHYSIOLOGY STUDY, NONINVASIVE;  Surgeon: Raz Wagner MD;  Location: [facility] EP LAB;  Service: Cardiology;  Laterality: N/A;  VT, DFTs, MDT CRTD in situ, LVAD, juarez MB, 3098    REPLACEMENT OF IMPLANTABLE CARDIOVERTER-DEFIBRILLATOR (ICD) GENERATOR N/A 3/9/2020    Procedure: REPLACEMENT, ICD GENERATOR;  Surgeon: Harry Yun MD;  Location: [facility] EP LAB;  Service: Cardiology;  Laterality: N/A;  VT, ICD Gen Change and Lead Revision, MDT, MAC, DM,3 Prep    REVISION OF IMPLANTABLE CARDIOVERTER-DEFIBRILLATOR (ICD) ELECTRODE LEAD PLACEMENT  N/A 3/9/2020    Procedure: REVISION, INSERTION, ELECTRODE LEAD, ICD;  Surgeon: Harry Yun MD;  Location: [facility] EP LAB;  Service: Cardiology;  Laterality: N/A;  VT, ICD Gen Change and Lead Revision, MDT, MAC, DM,3 Prep    TREATMENT OF CARDIAC ARRHYTHMIA  10/18/2019    Procedure: Cardioversion or Defibrillation;  Surgeon: Raz Wagner MD;  Location: [facility] EP LAB;  Service: Cardiology;;       Review of patient's allergies indicates:   Allergen Reactions    Lisinopril Anaphylaxis    Hydralazine analogues      Chronic constipation, impotence, dizziness       Medications:  Medications Prior to Admission   Medication Sig    allopurinol (ZYLOPRIM) 100 MG tablet TAKE 1 TABLET BY MOUTH EVERY DAY (Patient taking differently: Take 100 mg by mouth nightly. )    amiodarone (PACERONE) 400 MG tablet Take 1 tablet (400 mg total) by mouth once daily. (Patient taking differently: Take 200 mg by mouth once daily. )    amLODIPine (NORVASC) 10 MG tablet Take 1 tablet (10 mg total) by mouth once daily.    doxazosin (CARDURA) 8 MG Tab Take 1 tablet (8 mg total) by mouth every 12 (twelve) hours.    doxycycline (VIBRAMYCIN) 100 MG Cap Take 1 capsule (100 mg total) by mouth 2 (two) times daily. for 10 days    ferrous gluconate 324 mg (37.5 mg iron) Tab tablet Take 1 tablet (324 mg total) by mouth daily with breakfast. (Patient taking differently: Take 324 mg by mouth daily with lunch. )    guanFACINE (TENEX) 1 MG Tab Take 1 tablet (1 mg total) by mouth every evening.    pantoprazole (PROTONIX) 40 MG tablet TAKE 1 TABLET (40 MG TOTAL) BY MOUTH ONCE DAILY. (Patient taking differently: Take 40 mg by mouth daily with lunch. )    paroxetine (PAXIL) 10 MG tablet Take 1 tablet (10 mg total) by mouth every morning.    potassium chloride SA (K-DUR,KLOR-CON) 20 MEQ tablet Take 20 mEq by mouth 2 (two) times daily.    predniSONE (DELTASONE) 2.5 MG tablet Take 3 tablets (7.5 mg total) by mouth once daily for 10 days, THEN 2  tablets (5 mg total) once daily for 10 days, THEN 1 tablet (2.5 mg total) once daily for 15 days. Then discontinue..    spironolactone (ALDACTONE) 25 MG tablet Take 1 tablet (25 mg total) by mouth once daily. (Patient taking differently: Take 25 mg by mouth daily with lunch. )    torsemide (DEMADEX) 20 MG Tab TAKE 2 TABS BY MOUTH EVERY MORNING AND 1 TAB EVERY EVENING FOR 3 DAYS THEN 2 TABS DAILY THEREAFTER (Patient taking differently: Take 40 mg by mouth once daily. TAKE 2 TABS BY MOUTH EVERY MORNING AND 1 TAB EVERY EVENING FOR 3 DAYS THEN 2 TABS DAILY THEREAFTER)    vitamin D (VITAMIN D3) 1000 units Tab Take 1,000 Units by mouth once daily.    zolpidem (AMBIEN) 10 mg Tab Take 1 tablet (10 mg total) by mouth nightly as needed.    aspirin (ECOTRIN) 81 MG EC tablet Take 1 tablet (81 mg total) by mouth once daily.    fluticasone propionate (FLONASE) 50 mcg/actuation nasal spray 2 sprays (100 mcg total) by Each Nostril route once daily.    sildenafiL (VIAGRA) 100 MG tablet Take 1 tablet (100 mg total) by mouth daily as needed for Erectile Dysfunction.    warfarin (COUMADIN) 5 MG tablet Take 5mg orally daily except 2.5mg orally on Monday / Fridays (Patient taking differently: Take 2.5 mg by mouth. Take 5mg orally daily except 2.5mg orally on Wednesdays and Saturdays)     Antibiotics (From admission, onward)    Start     Stop Route Frequency Ordered    07/02/20 0930  vancomycin in dextrose 5 % 1 gram/250 mL IVPB 1,000 mg      -- IV Every 12 hours (non-standard times) 07/02/20 0823    07/01/20 1800  cefTRIAXone (ROCEPHIN) 2 g in dextrose 5 % 50 mL IVPB (ready to mix system)  (CEFTRIAXONE (ROCEPHIN) IM WITH LIDOCAINE)      -- IV Every 24 hours 07/01/20 1643    07/01/20 1742  vancomycin - pharmacy to dose  (vancomycin IVPB)      -- IV pharmacy to manage frequency 07/01/20 1643        Antifungals (From admission, onward)    None        Antivirals (From admission, onward)    None           Immunization History    Administered Date(s) Administered    Hepatitis A / Hepatitis B 2018    Influenza 2014    Influenza - Quadrivalent - PF (6 months and older) 2015, 2016, 10/05/2017    Influenza - Trivalent - PF (PED) 2014    Pneumococcal Conjugate - 13 Valent 2014    Pneumococcal Polysaccharide - 23 Valent 10/01/2015, 2016    Tdap 2018       Family History     Problem Relation (Age of Onset)    Cancer Sister (54)    Coronary artery disease Father    Diabetes Father    Heart disease Father    Hypertension Father    No Known Problems Mother, Brother        Social History     Socioeconomic History    Marital status:      Spouse name: Not on file    Number of children: Not on file    Years of education: Not on file    Highest education level: Not on file   Occupational History     Employer: [employer]    Social Needs    Financial resource strain: Not hard at all    Food insecurity     Worry: Never true     Inability: Never true    Transportation needs     Medical: No     Non-medical: No   Tobacco Use    Smoking status: Former Smoker     Packs/day: 1.00     Years: 31.00     Pack years: 31.00     Types: Cigarettes     Quit date: 2018     Years since quittin.4    Smokeless tobacco: Never Used    Tobacco comment: pt is quiting on his own - pt stated not qualified for program;  pt  quit on his own   Substance and Sexual Activity    Alcohol use: No     Alcohol/week: 0.0 standard drinks     Frequency: Never     Drinks per session: Patient refused     Binge frequency: Never     Comment: one fifth of gin or rum/week    Drug use: No    Sexual activity: Yes     Partners: Female     Birth control/protection: None     Comment: 10/5/17  with same partner 7 years    Lifestyle    Physical activity     Days per week: Not on file     Minutes per session: 60 min    Stress: Very much   Relationships    Social connections     Talks on phone: More than  three times a week     Gets together: More than three times a week     Attends Shinto service: Not on file     Active member of club or organization: Yes     Attends meetings of clubs or organizations: More than 4 times per year     Relationship status:    Other Topics Concern    Not on file   Social History Narrative    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   HENT: Negative for mouth sores and sore throat.    Eyes: Negative for visual disturbance.   Respiratory: Negative for shortness of breath and wheezing.    Cardiovascular: Negative for chest pain.   Gastrointestinal: Negative for abdominal distention, abdominal pain, constipation, diarrhea, nausea and vomiting.   Genitourinary: Negative for dysuria.   Musculoskeletal: Negative for myalgias.   Skin: Positive for wound. Negative for color change.        Yellow non-odorous draining from ICD wound   Neurological: Negative for dizziness and headaches.   Psychiatric/Behavioral: Negative for behavioral problems and confusion.     Objective:     Vital Signs (Most Recent):  Temp: 98.6 °F (37 °C) (07/02/20 0806)  Pulse: 71 (07/02/20 1010)  Resp: 16 (07/02/20 0806)  BP: (!) 82/0 (07/02/20 0806)  SpO2: 95 % (07/02/20 0806) Vital Signs (24h Range):  Temp:  [97.5 °F (36.4 °C)-98.7 °F (37.1 °C)] 98.6 °F (37 °C)  Pulse:  [62-73] 71  Resp:  [16] 16  SpO2:  [91 %-97 %] 95 %  BP: (78-90)/(0) 82/0     Weight: 123.4 kg (272 lb 0.8 oz)  Body mass index is 35.89 kg/m².    Estimated Creatinine Clearance: 61.9 mL/min (A) (based on SCr of 1.9 mg/dL (H)).    Physical Exam  Vitals signs reviewed.   Constitutional:       General: He is not in acute distress.     Appearance: Normal appearance. He is obese. He is not toxic-appearing.   HENT:      Head: Normocephalic and atraumatic.      Mouth/Throat:      Mouth: Mucous membranes are moist.      Pharynx: Oropharynx is clear.   Eyes:      General: No  scleral icterus.     Extraocular Movements: Extraocular movements intact.   Neck:      Musculoskeletal: Neck supple.   Cardiovascular:      Comments: VAD hum  Pulmonary:      Effort: Pulmonary effort is normal.      Breath sounds: Normal breath sounds.   Abdominal:      General: Bowel sounds are normal.      Palpations: Abdomen is soft.      Tenderness: There is no abdominal tenderness.   Lymphadenopathy:      Cervical: No cervical adenopathy.   Skin:     General: Skin is warm and dry.      Capillary Refill: Capillary refill takes less than 2 seconds.      Findings: Lesion present. No erythema.      Comments: ICD wound dressing CDI and surrounding area warm to touch   Neurological:      General: No focal deficit present.      Mental Status: He is alert and oriented to person, place, and time. Mental status is at baseline.   Psychiatric:         Mood and Affect: Mood normal.         Behavior: Behavior normal.         Thought Content: Thought content normal.         Judgment: Judgment normal.         Significant Labs:   CBC:   Recent Labs   Lab 07/02/20  0354   WBC 5.60   HGB 12.5*   HCT 41.7        CMP:   Recent Labs   Lab 07/02/20  0354      K 3.5      CO2 25   GLU 81   BUN 13   CREATININE 1.9*   CALCIUM 9.3   ANIONGAP 10   EGFRNONAA 39.4*     Wound Culture:   Recent Labs   Lab 07/01/20  1741   LABAERO No growth       Significant Imaging: I have reviewed all pertinent imaging results/findings within the past 24 hours.

## 2020-07-02 NOTE — PLAN OF CARE
Pt maintained free from falls/ trauma/ injuries and skin breakdown other than the ICD site that is infected with a hole. Pt is getting Ceftriaxone and Vanc. Pt denied pain or discomfort. He is getting diuresing with torsemide, K 3.5 replaced. INR 1.4, getting 7.5 mg of Warfarin this shift. Pt is free from any LVAD alarm, dressing due to be change on 7/3/20. Doppler runs in the 80s to 90/0. Asymptomatic and team aware. Plan of care reviewed. Pt verbalized understanding. All questions and concerns addressed. Will continue to monitor.

## 2020-07-02 NOTE — ASSESSMENT & PLAN NOTE
- LDH on admission 590   - INR 1.4 . Goal of 2-3   - given history of GI bleed in 2019 will just boost tonight with an extra dose of coumadin ( 7.5 mg)   - will get LFT's haptoglobulin and LDH   - patient denies any dark urine and no obvious scleral icterus on exam  - drive site clean and dry but will send site cultures.        Procedure: Device Interrogation Including analysis of device parameters  Current Settings: Ventricular Assist Device  Review of device function is stable/unstable stable    TXP LVAD INTERROGATIONS 7/2/2020 7/2/2020 7/2/2020 7/1/2020 7/1/2020 7/1/2020 6/3/2020   Type HeartMate II HeartMate II HeartMate II HeartMate II HeartMate II HeartMate II -   Flow 4.9 5.5 5.0 4.9 4.8 5.3 -   Speed 9800 980 9800 9800 9800 9800 -   PI 4 4.5 3.8 3.2 4.0 3.8 -   Power (Jackson) 6.1 6.1 6.1 6.1 6.0 6.4 -   LSL 9400 - - - - 9400 -   Pulsatility No Pulse - - - - - No Pulse

## 2020-07-02 NOTE — PROGRESS NOTES
07/02/20 1100   Vital Signs   BP (!) 90/0   Pt BP high. Asymptomatic. Team notified. Dr. Bang aware. Pt said his doppler usually run high and sometimes it matches up with his SBP. However, his BP is not picked up by dinamap today, Will continue to monitor.

## 2020-07-03 ENCOUNTER — TELEPHONE (OUTPATIENT)
Dept: ENDOCRINOLOGY | Facility: HOSPITAL | Age: 55
End: 2020-07-03

## 2020-07-03 DIAGNOSIS — E03.2 HYPOTHYROIDISM DUE TO DRUGS: Primary | ICD-10-CM

## 2020-07-03 PROBLEM — E03.8 OTHER SPECIFIED HYPOTHYROIDISM: Status: ACTIVE | Noted: 2020-07-03

## 2020-07-03 LAB
ALBUMIN SERPL BCP-MCNC: 3.5 G/DL (ref 3.5–5.2)
ALP SERPL-CCNC: 94 U/L (ref 55–135)
ALT SERPL W/O P-5'-P-CCNC: 19 U/L (ref 10–44)
ANION GAP SERPL CALC-SCNC: 9 MMOL/L (ref 8–16)
APTT BLDCRRT: 27.2 SEC (ref 21–32)
AST SERPL-CCNC: 27 U/L (ref 10–40)
BACTERIA SPEC AEROBE CULT: NORMAL
BASOPHILS # BLD AUTO: 0.03 K/UL (ref 0–0.2)
BASOPHILS NFR BLD: 0.5 % (ref 0–1.9)
BILIRUB DIRECT SERPL-MCNC: 0.2 MG/DL (ref 0.1–0.3)
BILIRUB SERPL-MCNC: 0.4 MG/DL (ref 0.1–1)
BNP SERPL-MCNC: 97 PG/ML (ref 0–99)
BUN SERPL-MCNC: 13 MG/DL (ref 6–20)
CALCIUM SERPL-MCNC: 9.2 MG/DL (ref 8.7–10.5)
CHLORIDE SERPL-SCNC: 102 MMOL/L (ref 95–110)
CO2 SERPL-SCNC: 25 MMOL/L (ref 23–29)
CREAT SERPL-MCNC: 1.7 MG/DL (ref 0.5–1.4)
CRP SERPL-MCNC: 20.6 MG/L (ref 0–8.2)
DIFFERENTIAL METHOD: ABNORMAL
EOSINOPHIL # BLD AUTO: 0.1 K/UL (ref 0–0.5)
EOSINOPHIL NFR BLD: 1.4 % (ref 0–8)
ERYTHROCYTE [DISTWIDTH] IN BLOOD BY AUTOMATED COUNT: 15.6 % (ref 11.5–14.5)
EST. GFR  (AFRICAN AMERICAN): 52.1 ML/MIN/1.73 M^2
EST. GFR  (NON AFRICAN AMERICAN): 45 ML/MIN/1.73 M^2
GLUCOSE SERPL-MCNC: 93 MG/DL (ref 70–110)
HCT VFR BLD AUTO: 42.6 % (ref 40–54)
HGB BLD-MCNC: 12.7 G/DL (ref 14–18)
IMM GRANULOCYTES # BLD AUTO: 0.04 K/UL (ref 0–0.04)
IMM GRANULOCYTES NFR BLD AUTO: 0.6 % (ref 0–0.5)
INR PPP: 1.5 (ref 0.8–1.2)
LDH SERPL L TO P-CCNC: 645 U/L (ref 110–260)
LYMPHOCYTES # BLD AUTO: 0.8 K/UL (ref 1–4.8)
LYMPHOCYTES NFR BLD: 12.8 % (ref 18–48)
MAGNESIUM SERPL-MCNC: 2 MG/DL (ref 1.6–2.6)
MCH RBC QN AUTO: 26.6 PG (ref 27–31)
MCHC RBC AUTO-ENTMCNC: 29.8 G/DL (ref 32–36)
MCV RBC AUTO: 89 FL (ref 82–98)
MONOCYTES # BLD AUTO: 0.7 K/UL (ref 0.3–1)
MONOCYTES NFR BLD: 11.4 % (ref 4–15)
NEUTROPHILS # BLD AUTO: 4.6 K/UL (ref 1.8–7.7)
NEUTROPHILS NFR BLD: 73.3 % (ref 38–73)
NRBC BLD-RTO: 0 /100 WBC
PHOSPHATE SERPL-MCNC: 3.3 MG/DL (ref 2.7–4.5)
PLATELET # BLD AUTO: 231 K/UL (ref 150–350)
PMV BLD AUTO: 10.7 FL (ref 9.2–12.9)
POTASSIUM SERPL-SCNC: 3.9 MMOL/L (ref 3.5–5.1)
PREALB SERPL-MCNC: 23 MG/DL (ref 20–43)
PROT SERPL-MCNC: 7.2 G/DL (ref 6–8.4)
PROTHROMBIN TIME: 14.4 SEC (ref 9–12.5)
RBC # BLD AUTO: 4.78 M/UL (ref 4.6–6.2)
SODIUM SERPL-SCNC: 136 MMOL/L (ref 136–145)
VANCOMYCIN SERPL-MCNC: 18.7 UG/ML
WBC # BLD AUTO: 6.23 K/UL (ref 3.9–12.7)

## 2020-07-03 PROCEDURE — 83880 ASSAY OF NATRIURETIC PEPTIDE: CPT

## 2020-07-03 PROCEDURE — 80202 ASSAY OF VANCOMYCIN: CPT

## 2020-07-03 PROCEDURE — 99233 PR SUBSEQUENT HOSPITAL CARE,LEVL III: ICD-10-PCS | Mod: ,,, | Performed by: INTERNAL MEDICINE

## 2020-07-03 PROCEDURE — 99233 SBSQ HOSP IP/OBS HIGH 50: CPT | Mod: ,,, | Performed by: INTERNAL MEDICINE

## 2020-07-03 PROCEDURE — 85610 PROTHROMBIN TIME: CPT

## 2020-07-03 PROCEDURE — 99232 PR SUBSEQUENT HOSPITAL CARE,LEVL II: ICD-10-PCS | Mod: ,,, | Performed by: INTERNAL MEDICINE

## 2020-07-03 PROCEDURE — 99222 1ST HOSP IP/OBS MODERATE 55: CPT | Mod: ,,, | Performed by: INTERNAL MEDICINE

## 2020-07-03 PROCEDURE — 85025 COMPLETE CBC W/AUTO DIFF WBC: CPT

## 2020-07-03 PROCEDURE — 63600175 PHARM REV CODE 636 W HCPCS: Performed by: STUDENT IN AN ORGANIZED HEALTH CARE EDUCATION/TRAINING PROGRAM

## 2020-07-03 PROCEDURE — 84134 ASSAY OF PREALBUMIN: CPT

## 2020-07-03 PROCEDURE — 20600001 HC STEP DOWN PRIVATE ROOM

## 2020-07-03 PROCEDURE — 99222 PR INITIAL HOSPITAL CARE,LEVL II: ICD-10-PCS | Mod: ,,, | Performed by: INTERNAL MEDICINE

## 2020-07-03 PROCEDURE — 84100 ASSAY OF PHOSPHORUS: CPT

## 2020-07-03 PROCEDURE — 27000685 HC LVAD KIT (30 DAY SUPPLY)

## 2020-07-03 PROCEDURE — 63600175 PHARM REV CODE 636 W HCPCS: Performed by: INTERNAL MEDICINE

## 2020-07-03 PROCEDURE — 25000003 PHARM REV CODE 250: Performed by: INTERNAL MEDICINE

## 2020-07-03 PROCEDURE — 93750 INTERROGATION VAD IN PERSON: CPT | Mod: ,,, | Performed by: INTERNAL MEDICINE

## 2020-07-03 PROCEDURE — 25000003 PHARM REV CODE 250: Performed by: STUDENT IN AN ORGANIZED HEALTH CARE EDUCATION/TRAINING PROGRAM

## 2020-07-03 PROCEDURE — 25000003 PHARM REV CODE 250

## 2020-07-03 PROCEDURE — 80048 BASIC METABOLIC PNL TOTAL CA: CPT

## 2020-07-03 PROCEDURE — 80076 HEPATIC FUNCTION PANEL: CPT

## 2020-07-03 PROCEDURE — 27000248 HC VAD-ADDITIONAL DAY

## 2020-07-03 PROCEDURE — 36415 COLL VENOUS BLD VENIPUNCTURE: CPT

## 2020-07-03 PROCEDURE — 93750 PR INTERROGATE VENT ASSIST DEV, IN PERSON, W PHYSICIAN ANALYSIS: ICD-10-PCS | Mod: ,,, | Performed by: INTERNAL MEDICINE

## 2020-07-03 PROCEDURE — 83615 LACTATE (LD) (LDH) ENZYME: CPT

## 2020-07-03 PROCEDURE — 87040 BLOOD CULTURE FOR BACTERIA: CPT | Mod: 59

## 2020-07-03 PROCEDURE — 83735 ASSAY OF MAGNESIUM: CPT

## 2020-07-03 PROCEDURE — 86140 C-REACTIVE PROTEIN: CPT

## 2020-07-03 PROCEDURE — 99232 SBSQ HOSP IP/OBS MODERATE 35: CPT | Mod: ,,, | Performed by: INTERNAL MEDICINE

## 2020-07-03 PROCEDURE — 85730 THROMBOPLASTIN TIME PARTIAL: CPT

## 2020-07-03 RX ORDER — LEVOTHYROXINE SODIUM 50 UG/1
50 TABLET ORAL
Status: DISCONTINUED | OUTPATIENT
Start: 2020-07-03 | End: 2020-07-03

## 2020-07-03 RX ORDER — AMOXICILLIN 250 MG
1 CAPSULE ORAL DAILY PRN
Status: DISCONTINUED | OUTPATIENT
Start: 2020-07-03 | End: 2020-07-14 | Stop reason: HOSPADM

## 2020-07-03 RX ORDER — WARFARIN 7.5 MG/1
7.5 TABLET ORAL DAILY
Status: DISCONTINUED | OUTPATIENT
Start: 2020-07-03 | End: 2020-07-04

## 2020-07-03 RX ORDER — POLYETHYLENE GLYCOL 3350 17 G/17G
17 POWDER, FOR SOLUTION ORAL DAILY PRN
Status: DISCONTINUED | OUTPATIENT
Start: 2020-07-03 | End: 2020-07-14 | Stop reason: HOSPADM

## 2020-07-03 RX ADMIN — SPIRONOLACTONE 25 MG: 25 TABLET ORAL at 11:07

## 2020-07-03 RX ADMIN — SENNOSIDES AND DOCUSATE SODIUM 1 TABLET: 8.6; 5 TABLET ORAL at 11:07

## 2020-07-03 RX ADMIN — AMIODARONE HYDROCHLORIDE 200 MG: 200 TABLET ORAL at 09:07

## 2020-07-03 RX ADMIN — ZOLPIDEM TARTRATE 10 MG: 5 TABLET ORAL at 09:07

## 2020-07-03 RX ADMIN — MELATONIN 1000 UNITS: at 09:07

## 2020-07-03 RX ADMIN — LEVOTHYROXINE SODIUM 50 MCG: 50 TABLET ORAL at 09:07

## 2020-07-03 RX ADMIN — AMLODIPINE BESYLATE 10 MG: 10 TABLET ORAL at 09:07

## 2020-07-03 RX ADMIN — PREDNISONE 2.5 MG: 2.5 TABLET ORAL at 08:07

## 2020-07-03 RX ADMIN — POTASSIUM CHLORIDE 20 MEQ: 1500 TABLET, EXTENDED RELEASE ORAL at 09:07

## 2020-07-03 RX ADMIN — ALLOPURINOL 100 MG: 100 TABLET ORAL at 09:07

## 2020-07-03 RX ADMIN — PANTOPRAZOLE SODIUM 40 MG: 40 TABLET, DELAYED RELEASE ORAL at 11:07

## 2020-07-03 RX ADMIN — POLYETHYLENE GLYCOL 3350 17 G: 17 POWDER, FOR SOLUTION ORAL at 09:07

## 2020-07-03 RX ADMIN — GUANFACINE 1 MG: 1 TABLET ORAL at 09:07

## 2020-07-03 RX ADMIN — DOXAZOSIN 8 MG: 8 TABLET ORAL at 09:07

## 2020-07-03 RX ADMIN — POTASSIUM CHLORIDE 20 MEQ: 1500 TABLET, EXTENDED RELEASE ORAL at 08:07

## 2020-07-03 RX ADMIN — POLYETHYLENE GLYCOL 3350 17 G: 17 POWDER, FOR SOLUTION ORAL at 11:07

## 2020-07-03 RX ADMIN — PAROXETINE HYDROCHLORIDE 10 MG: 10 TABLET, FILM COATED ORAL at 09:07

## 2020-07-03 RX ADMIN — ASPIRIN 81 MG: 81 TABLET, COATED ORAL at 08:07

## 2020-07-03 RX ADMIN — WARFARIN SODIUM 7.5 MG: 7.5 TABLET ORAL at 06:07

## 2020-07-03 RX ADMIN — VANCOMYCIN HYDROCHLORIDE 1000 MG: 1 INJECTION, POWDER, LYOPHILIZED, FOR SOLUTION INTRAVENOUS at 09:07

## 2020-07-03 RX ADMIN — FERROUS GLUCONATE TAB 324 MG (37.5 MG ELEMENTAL IRON) 324 MG: 324 (37.5 FE) TAB at 11:07

## 2020-07-03 RX ADMIN — TORSEMIDE 40 MG: 20 TABLET ORAL at 09:07

## 2020-07-03 RX ADMIN — CEFTRIAXONE 2 G: 2 INJECTION, SOLUTION INTRAVENOUS at 06:07

## 2020-07-03 NOTE — SUBJECTIVE & OBJECTIVE
Interval History: afebrile, denies any diarrhea , N/V. Aerobic clx growing GNR.    Review of Systems   Constitutional: Negative for activity change, appetite change, chills and fever.   HENT: Negative for congestion.    Eyes: Negative for pain and itching.   Respiratory: Negative for cough, chest tightness and shortness of breath.    Cardiovascular: Negative for chest pain, palpitations and leg swelling.   Gastrointestinal: Negative for abdominal pain and nausea.   Endocrine: Negative for polyphagia and polyuria.   Genitourinary: Negative for dysuria and frequency.   Musculoskeletal: Negative for back pain.   Neurological: Negative for numbness and headaches.   Psychiatric/Behavioral: Negative for agitation and behavioral problems.     Objective:     Vital Signs (Most Recent):  Temp: 98.1 °F (36.7 °C) (07/03/20 1516)  Pulse: 68 (07/03/20 1516)  Resp: 18 (07/03/20 1516)  BP: (!) 86/0 (07/03/20 1516)  SpO2: 95 % (07/03/20 1516) Vital Signs (24h Range):  Temp:  [97.7 °F (36.5 °C)-98.8 °F (37.1 °C)] 98.1 °F (36.7 °C)  Pulse:  [65-77] 68  Resp:  [16-18] 18  SpO2:  [93 %-99 %] 95 %  BP: (82-90)/(0) 86/0     Weight: 122.1 kg (269 lb 2.9 oz)  Body mass index is 35.51 kg/m².    Estimated Creatinine Clearance: 68.8 mL/min (A) (based on SCr of 1.7 mg/dL (H)).    Physical Exam  Constitutional:       Appearance: He is well-developed.   HENT:      Head: Normocephalic.   Cardiovascular:      Rate and Rhythm: Normal rate.      Heart sounds: Murmur present.      Comments: VAD- Left chest wall ICU with open wound and purlent discharge.  Pulmonary:      Effort: Pulmonary effort is normal.      Breath sounds: Normal breath sounds.   Abdominal:      General: Bowel sounds are normal. There is no distension.      Palpations: Abdomen is soft.      Tenderness: There is no abdominal tenderness.   Musculoskeletal: Normal range of motion.   Neurological:      Mental Status: He is alert and oriented to person, place, and time.   Psychiatric:          Behavior: Behavior normal.         Significant Labs:   Blood Culture:   Recent Labs   Lab 03/11/20  0431 07/02/20  1110 07/03/20  0711 07/03/20  0712   LABBLOO No growth after 5 days.  No growth after 5 days. No Growth to date  No Growth to date No Growth to date No Growth to date     C4 Count: No results for input(s): C4 in the last 48 hours.  CMP:   Recent Labs   Lab 07/02/20  0354 07/03/20  0707 07/03/20  0709    136  --    K 3.5 3.9  --     102  --    CO2 25 25  --    GLU 81 93  --    BUN 13 13  --    CREATININE 1.9* 1.7*  --    CALCIUM 9.3 9.2  --    PROT 7.1  --  7.2   ALBUMIN 3.5  --  3.5   BILITOT 0.6  --  0.4   ALKPHOS 86  --  94   AST 31  --  27   ALT 17  --  19   ANIONGAP 10 9  --    EGFRNONAA 39.4* 45.0*  --      Urine Culture: No results for input(s): LABURIN in the last 4320 hours.  Urine Studies:   Recent Labs   Lab 03/11/20  0454   COLORU Yellow   APPEARANCEUA Cloudy*   PHUR 5.0   SPECGRAV 1.020   PROTEINUA Negative   GLUCUA Negative   KETONESU Negative   BILIRUBINUA Negative   OCCULTUA 1+*   NITRITE Negative   LEUKOCYTESUR Negative   RBCUA 2   WBCUA 2   BACTERIA Rare   HYALINECASTS 16*     Wound Culture:   Recent Labs   Lab 07/01/20  1741 07/01/20  2153   LABAERO No significant isolate GRAM NEGATIVE LAURA  Moderate  Identification and susceptibility pending  No other significant isolate  *     All pertinent labs within the past 24 hours have been reviewed.  None    Significant Imaging: I have reviewed all pertinent imaging results/findings within the past 24 hours.

## 2020-07-03 NOTE — SUBJECTIVE & OBJECTIVE
Interval History: Patient with no complaints at bedside. No events noted on telemetry. Blood cultures are negative to date. Remains afebrile, no white count.     ROS  Objective:     Vital Signs (Most Recent):  Temp: 97.7 °F (36.5 °C) (07/03/20 0726)  Pulse: 75 (07/03/20 0726)  Resp: 16 (07/03/20 0726)  BP: (!) 82/0 (07/03/20 0726)  SpO2: (!) 93 % (07/03/20 0726) Vital Signs (24h Range):  Temp:  [97.7 °F (36.5 °C)-98.4 °F (36.9 °C)] 97.7 °F (36.5 °C)  Pulse:  [65-77] 75  Resp:  [16] 16  SpO2:  [93 %-98 %] 93 %  BP: (80-90)/(0) 82/0     Weight: 122.1 kg (269 lb 2.9 oz)  Body mass index is 35.51 kg/m².     SpO2: (!) 93 %  O2 Device (Oxygen Therapy): room air    Physical Exam   Constitutional: He is oriented to person, place, and time. He appears well-developed and well-nourished. No distress.   HENT:   Head: Normocephalic.   Left Ear: External ear normal.   Eyes: Pupils are equal, round, and reactive to light. Right eye exhibits no discharge. Left eye exhibits no discharge.   Neck: Normal range of motion. No thyromegaly present.   Cardiovascular: Normal rate and regular rhythm. Exam reveals no friction rub.   Murmur heard.  Pulmonary/Chest: Effort normal and breath sounds normal. No respiratory distress.   Abdominal: Soft. Bowel sounds are normal. He exhibits distension. There is no abdominal tenderness.   Musculoskeletal: Normal range of motion.         General: No edema.   Neurological: He is alert and oriented to person, place, and time.   Skin: Rash noted. There is erythema.       Significant Labs:   BMP:   Recent Labs   Lab 07/02/20 0354 07/03/20 0707   GLU 81 93    136   K 3.5 3.9    102   CO2 25 25   BUN 13 13   CREATININE 1.9* 1.7*   CALCIUM 9.3 9.2   MG 2.0 2.0   , CMP:   Recent Labs   Lab 07/02/20 0354 07/03/20  0707 07/03/20  0709    136  --    K 3.5 3.9  --     102  --    CO2 25 25  --    GLU 81 93  --    BUN 13 13  --    CREATININE 1.9* 1.7*  --    CALCIUM 9.3 9.2  --    PROT 7.1   --  7.2   ALBUMIN 3.5  --  3.5   BILITOT 0.6  --  0.4   ALKPHOS 86  --  94   AST 31  --  27   ALT 17  --  19   ANIONGAP 10 9  --    ESTGFRAFRICA 45.5* 52.1*  --    EGFRNONAA 39.4* 45.0*  --     and CBC:   Recent Labs   Lab 07/02/20  0354 07/03/20  0707   WBC 5.60 6.23   HGB 12.5* 12.7*   HCT 41.7 42.6    231

## 2020-07-03 NOTE — ASSESSMENT & PLAN NOTE
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    Recommendations:     1- F/u on wound cx.   2-Continue ceftriaxone and discontinue vancomycin.  3-May need PICC placement and continued IV abx at home vs PO abx, depending on wound cx.   4- If blood clx become positive, consider BRITTANY. Source control per EP plan for debridement/generator change/lead extraction as indicated.

## 2020-07-03 NOTE — PLAN OF CARE
Pt maintained free from falls/ trauma/ injuries and skin breakdown other than the ICD site that is infected with a hole. Pt is getting Ceftriaxone. Vanc was d/c after the am dosage this shift. Pt denied pain or discomfort. He is getting diuresing with torsemide, K 3.9 replaced. INR 1.5, getting 7.5 mg of Warfarin this shift. Pt is free from any LVAD alarm, dressing changed this shift, due to be change again on 7/5/20. Doppler runs in the 80s to 90/0. Asymptomatic and team aware. Pt reported last BM past Monday, laxative given. Pt received Levothroid and prednisone this shift. Plan of care reviewed. Pt verbalized understanding. All questions and concerns addressed. Will continue to monitor.

## 2020-07-03 NOTE — SUBJECTIVE & OBJECTIVE
Interval History: Patient seen and examined today at bedside. Feeling better, no new complaints. Slight drainage at ICD pocket site. Dressing in place, not much tender.   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 Will c/w coumadin. INR 1.5 today. Coumadin 7.5 mg daily.   -> 645, will monitor urine. LFT normal.    C/S drawn - f/u sensitivities. As per ID if repeat positive, consider BRITTANY.  EP follow up appreciated.      Continuous Infusions:  Scheduled Meds:   allopurinoL  100 mg Oral Daily    amiodarone  200 mg Oral Daily    amLODIPine  10 mg Oral Daily    aspirin  81 mg Oral Daily    cefTRIAXone (ROCEPHIN) 2 g in dextrose 5 % 50 mL IVPB (ready to mix system)  2 g Intravenous Q24H    doxazosin  8 mg Oral Q12H    ferrous gluconate  324 mg Oral Daily with lunch    fluticasone propionate  2 spray Each Nostril Daily    guanFACINE  1 mg Oral QHS    levothyroxine  50 mcg Oral Before breakfast    pantoprazole  40 mg Oral Daily with lunch    paroxetine  10 mg Oral QAM    potassium chloride SA  20 mEq Oral BID    predniSONE  2.5 mg Oral Daily    spironolactone  25 mg Oral Daily with lunch    torsemide  40 mg Oral Daily    vancomycin (VANCOCIN) IVPB  1,000 mg Intravenous Q12H    vitamin D  1,000 Units Oral Daily     PRN Meds:Pharmacy to dose Vancomycin consult **AND** vancomycin - pharmacy to dose, zolpidem    Review of patient's allergies indicates:   Allergen Reactions    Lisinopril Anaphylaxis    Hydralazine analogues      Chronic constipation, impotence, dizziness     Objective:     Vital Signs (Most Recent):  Temp: 97.7 °F (36.5 °C) (07/03/20 0726)  Pulse: 75 (07/03/20 0726)  Resp: 16 (07/03/20 0726)  BP: (!) 82/0 (07/03/20 0726)  SpO2: (!) 93 % (07/03/20 0726) Vital Signs (24h Range):  Temp:  [97.7 °F (36.5 °C)-98.4 °F (36.9 °C)] 97.7 °F (36.5 °C)  Pulse:  [65-77] 75  Resp:  [16] 16  SpO2:  [93 %-98 %] 93 %  BP: (80-90)/(0) 82/0     Patient Vitals  for the past 72 hrs (Last 3 readings):   Weight   07/03/20 0726 122.1 kg (269 lb 2.9 oz)   07/02/20 0500 123.4 kg (272 lb 0.8 oz)   07/01/20 1400 125.5 kg (276 lb 10.8 oz)     Body mass index is 35.51 kg/m².      Intake/Output Summary (Last 24 hours) at 7/3/2020 1029  Last data filed at 7/3/2020 0902  Gross per 24 hour   Intake 2230 ml   Output 1700 ml   Net 530 ml       Hemodynamic Parameters:       Telemetry: VAD artifac    Physical Exam    Significant Labs:  CBC:  Recent Labs   Lab 07/02/20  0354 07/03/20  0707   WBC 5.60 6.23   RBC 4.68 4.78   HGB 12.5* 12.7*   HCT 41.7 42.6    231   MCV 89 89   MCH 26.7* 26.6*   MCHC 30.0* 29.8*     BNP:  Recent Labs   Lab 07/03/20  0709   BNP 97     CMP:  Recent Labs   Lab 07/02/20  0354 07/03/20  0707 07/03/20  0709   GLU 81 93  --    CALCIUM 9.3 9.2  --    ALBUMIN 3.5  --  3.5   PROT 7.1  --  7.2    136  --    K 3.5 3.9  --    CO2 25 25  --     102  --    BUN 13 13  --    CREATININE 1.9* 1.7*  --    ALKPHOS 86  --  94   ALT 17  --  19   AST 31  --  27   BILITOT 0.6  --  0.4      Coagulation:   Recent Labs   Lab 07/02/20  0354 07/03/20  0707   INR 1.4* 1.5*   APTT 29.6 27.2     LDH:  Recent Labs   Lab 07/02/20  0354 07/03/20  0707   * 645*     Microbiology:  Microbiology Results (last 7 days)     Procedure Component Value Units Date/Time    Aerobic culture [[id number]]  (Abnormal) Collected: 07/01/20 2153    Order Status: Completed Specimen: Wound from Abdomen Updated: 07/03/20 1016     Aerobic Bacterial Culture GRAM NEGATIVE LAURA  Moderate  Identification and susceptibility pending  No other significant isolate      Narrative:      Left chest incision    Blood culture [[id number]] Collected: 07/03/20 0712    Order Status: Sent Specimen: Blood Updated: 07/03/20 0718    Blood culture [[id number]] Collected: 07/03/20 0711    Order Status: Sent Specimen: Blood Updated: 07/03/20 0718    Blood culture [[id number]] Collected: 07/02/20 1110    Order Status:  Completed Specimen: Blood Updated: 07/02/20 1945     Blood Culture, Routine No Growth to date    Culture, Anaerobe [[id number]] Collected: 07/01/20 1741    Order Status: Completed Specimen: Incision site from Chest, Left Updated: 07/02/20 1109     Anaerobic Culture Culture in progress    Aerobic culture [[id number]] Collected: 07/01/20 1741    Order Status: Completed Specimen: Skin from Abdomen Updated: 07/02/20 0822     Aerobic Bacterial Culture No growth    Culture, Anaerobe [[id number]] Collected: 07/01/20 1741    Order Status: Completed Specimen: Skin from Abdomen Updated: 07/02/20 0733     Anaerobic Culture Culture in progress    Narrative:      Drive line site    Culture, Anaerobe [[id number]] Collected: 07/01/20 2153    Order Status: Sent Specimen: Wound from Abdomen Updated: 07/01/20 2212    Culture, Anaerobe [[id number]]     Order Status: Canceled Specimen: Skin from Abdomen     Aerobic culture [[id number]]     Order Status: Canceled Specimen: Skin from Abdomen     Aerobic culture [[id number]] Collected: 07/01/20 1741    Order Status: Canceled Specimen: Incision site from Chest, Left           BMP:   Recent Labs   Lab 07/03/20  0707   GLU 93      K 3.9      CO2 25   BUN 13   CREATININE 1.7*   CALCIUM 9.2   MG 2.0     Cardiac Markers: No results for input(s): CKMB, TROPONINT, MYOGLOBIN in the last 72 hours.  Coagulation:   Recent Labs   Lab 07/03/20  0707   INR 1.5*   APTT 27.2     I have reviewed all pertinent labs within the past 24 hours.    Estimated Creatinine Clearance: 68.8 mL/min (A) (based on SCr of 1.7 mg/dL (H)).    Diagnostic Results:  I have reviewed all pertinent imaging results/findings within the past 24 hours.

## 2020-07-03 NOTE — NURSING
"LVAD DLES dressing changed under sterile technique using kit. DLES is a "1" CDI. Pt tolerated well, no bleeding noted, no active drainage noted. Dressing due to be changed 7/5/20. Will continue to monitor.  "

## 2020-07-03 NOTE — ASSESSMENT & PLAN NOTE
- LDH  -> 645, will monitor urine. LFT normal.  - INR 1.5 . Goal of 2-3   - given history of GI bleed in 2019 will just boost tonight with an extra dose of coumadin ( 7.5 mg)   - patient denies any dark urine and no obvious scleral icterus on exam  - drive site clean and dry but will send site cultures.    Procedure: Device Interrogation Including analysis of device parameters  Current Settings: Ventricular Assist Device  Review of device function is stable/unstable stable    TXP LVAD INTERROGATIONS 7/3/2020 7/3/2020 7/2/2020 7/2/2020 7/2/2020 7/2/2020 7/2/2020   Type HeartMate II HeartMate II HeartMate II HeartMate II HeartMate3 HeartMate II HeartMate II   Flow 4.6 4.9 5.0 4.9 5.4 5 4.9   Speed 9800 9800 9800 9800 9800 9800 9800   PI 3.9 3.9 3.1 3.2 4.5 4.4 4   Power ([place]) 5.9 6.1 6.1 6.1 6.3 6.2 6.1   LSL 9400 9400 9400 9400 9400 9400 9400   Pulsatility No Pulse - - - Intermittent pulse Intermittent pulse No Pulse

## 2020-07-03 NOTE — PROGRESS NOTES
[facility]  Cardiac Electrophysiology  Progress Note    Admission Date: 7/1/2020  Code Status: Full Code   Attending Physician: Guerda Bautista MD   Expected Discharge Date: 7/6/2020  Principal Problem:ICD (implantable cardioverter-defibrillator) infection    Subjective:     Interval History: Patient with no complaints at bedside. No events noted on telemetry. Blood cultures are negative to date. Remains afebrile, no white count.     ROS  Objective:     Vital Signs (Most Recent):  Temp: 97.7 °F (36.5 °C) (07/03/20 0726)  Pulse: 75 (07/03/20 0726)  Resp: 16 (07/03/20 0726)  BP: (!) 82/0 (07/03/20 0726)  SpO2: (!) 93 % (07/03/20 0726) Vital Signs (24h Range):  Temp:  [97.7 °F (36.5 °C)-98.4 °F (36.9 °C)] 97.7 °F (36.5 °C)  Pulse:  [65-77] 75  Resp:  [16] 16  SpO2:  [93 %-98 %] 93 %  BP: (80-90)/(0) 82/0     Weight: 122.1 kg (269 lb 2.9 oz)  Body mass index is 35.51 kg/m².     SpO2: (!) 93 %  O2 Device (Oxygen Therapy): room air    Physical Exam   Constitutional: He is oriented to person, place, and time. He appears well-developed and well-nourished. No distress.   HENT:   Head: Normocephalic.   Left Ear: External ear normal.   Eyes: Pupils are equal, round, and reactive to light. Right eye exhibits no discharge. Left eye exhibits no discharge.   Neck: Normal range of motion. No thyromegaly present.   Cardiovascular: Normal rate and regular rhythm. Exam reveals no friction rub.   Murmur heard.  Pulmonary/Chest: Effort normal and breath sounds normal. No respiratory distress.   Abdominal: Soft. Bowel sounds are normal. He exhibits distension. There is no abdominal tenderness.   Musculoskeletal: Normal range of motion.         General: No edema.   Neurological: He is alert and oriented to person, place, and time.   Skin: Rash noted. There is erythema.       Significant Labs:   BMP:   Recent Labs   Lab 07/02/20  0354 07/03/20  0707   GLU 81 93    136   K 3.5 3.9    102   CO2 25 25   BUN 13  13   CREATININE 1.9* 1.7*   CALCIUM 9.3 9.2   MG 2.0 2.0   , CMP:   Recent Labs   Lab 07/02/20  0354 07/03/20  0707 07/03/20  0709    136  --    K 3.5 3.9  --     102  --    CO2 25 25  --    GLU 81 93  --    BUN 13 13  --    CREATININE 1.9* 1.7*  --    CALCIUM 9.3 9.2  --    PROT 7.1  --  7.2   ALBUMIN 3.5  --  3.5   BILITOT 0.6  --  0.4   ALKPHOS 86  --  94   AST 31  --  27   ALT 17  --  19   ANIONGAP 10 9  --    ESTGFRAFRICA 45.5* 52.1*  --    EGFRNONAA 39.4* 45.0*  --     and CBC:   Recent Labs   Lab 07/02/20  0354 07/03/20  0707   WBC 5.60 6.23   HGB 12.5* 12.7*   HCT 41.7 42.6    231     Assessment and Plan:     * ICD (implantable cardioverter-defibrillator) infection  BiV-Icd [employer] (2014 Dr. Chiu), successful DFT at 35J on 10/18/2019. Multiple VT episodes,  generator change (BiV ICD -> dual ICD),  device revision (addition of a new RV/ICD lead) and another VF induction via ICD on 3/2020. Presented from device clinic with device erosion.       - Blood cultured negative to date- awaiting final results  - Antibiotic regimen per ID recommendations (currently on Rocephin/ Vancomycin)  - May need PICC placement and continued IV abx at home vs PO abx, depending on wound cx, awaiting final ID recommendations  - Continue current device setting and current antiarrythmic agents   - Plan to evaluate for device extraction in near future   - When antibiotics concluded (per ID recommendations), will consider an alternative method of ICD implantation   - CT surgery consulted for evaluation back up recommendations regarding device extraction        Lana Garza MD  Cardiac Electrophysiology  [facility]

## 2020-07-03 NOTE — PROGRESS NOTES
[facility]  Heart Transplant  Progress Note    Patient Name: Tim Richards  MRN: [id number]  Admission Date: 7/1/2020  Hospital Length of Stay: 2 days  Attending Physician: Guerda Bautista MD  Primary Care Provider: Power Connors MD  Principal Problem:ICD (implantable cardioverter-defibrillator) infection    Subjective:     Interval History: Patient seen and examined today at bedside. Feeling better, no new complaints. Slight drainage at ICD pocket site. Dressing in place, not much tender.   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 Will c/w coumadin. INR 1.5 today. Coumadin 7.5 mg daily.   -> 645, will monitor urine. LFT normal.    C/S drawn - f/u sensitivities. As per ID if repeat positive, consider BRITTANY.  EP follow up appreciated.      Continuous Infusions:  Scheduled Meds:   allopurinoL  100 mg Oral Daily    amiodarone  200 mg Oral Daily    amLODIPine  10 mg Oral Daily    aspirin  81 mg Oral Daily    cefTRIAXone (ROCEPHIN) 2 g in dextrose 5 % 50 mL IVPB (ready to mix system)  2 g Intravenous Q24H    doxazosin  8 mg Oral Q12H    ferrous gluconate  324 mg Oral Daily with lunch    fluticasone propionate  2 spray Each Nostril Daily    guanFACINE  1 mg Oral QHS    levothyroxine  50 mcg Oral Before breakfast    pantoprazole  40 mg Oral Daily with lunch    paroxetine  10 mg Oral QAM    potassium chloride SA  20 mEq Oral BID    predniSONE  2.5 mg Oral Daily    spironolactone  25 mg Oral Daily with lunch    torsemide  40 mg Oral Daily    vancomycin (VANCOCIN) IVPB  1,000 mg Intravenous Q12H    vitamin D  1,000 Units Oral Daily     PRN Meds:Pharmacy to dose Vancomycin consult **AND** vancomycin - pharmacy to dose, zolpidem    Review of patient's allergies indicates:   Allergen Reactions    Lisinopril Anaphylaxis    Hydralazine analogues      Chronic constipation, impotence, dizziness     Objective:     Vital Signs (Most  Recent):  Temp: 97.7 °F (36.5 °C) (07/03/20 0726)  Pulse: 75 (07/03/20 0726)  Resp: 16 (07/03/20 0726)  BP: (!) 82/0 (07/03/20 0726)  SpO2: (!) 93 % (07/03/20 0726) Vital Signs (24h Range):  Temp:  [97.7 °F (36.5 °C)-98.4 °F (36.9 °C)] 97.7 °F (36.5 °C)  Pulse:  [65-77] 75  Resp:  [16] 16  SpO2:  [93 %-98 %] 93 %  BP: (80-90)/(0) 82/0     Patient Vitals for the past 72 hrs (Last 3 readings):   Weight   07/03/20 0726 122.1 kg (269 lb 2.9 oz)   07/02/20 0500 123.4 kg (272 lb 0.8 oz)   07/01/20 1400 125.5 kg (276 lb 10.8 oz)     Body mass index is 35.51 kg/m².      Intake/Output Summary (Last 24 hours) at 7/3/2020 1029  Last data filed at 7/3/2020 0902  Gross per 24 hour   Intake 2230 ml   Output 1700 ml   Net 530 ml       Hemodynamic Parameters:       Telemetry: VAD artifac    Physical Exam    Significant Labs:  CBC:  Recent Labs   Lab 07/02/20  0354 07/03/20 0707   WBC 5.60 6.23   RBC 4.68 4.78   HGB 12.5* 12.7*   HCT 41.7 42.6    231   MCV 89 89   MCH 26.7* 26.6*   MCHC 30.0* 29.8*     BNP:  Recent Labs   Lab 07/03/20  0709   BNP 97     CMP:  Recent Labs   Lab 07/02/20  0354 07/03/20  0707 07/03/20  0709   GLU 81 93  --    CALCIUM 9.3 9.2  --    ALBUMIN 3.5  --  3.5   PROT 7.1  --  7.2    136  --    K 3.5 3.9  --    CO2 25 25  --     102  --    BUN 13 13  --    CREATININE 1.9* 1.7*  --    ALKPHOS 86  --  94   ALT 17  --  19   AST 31  --  27   BILITOT 0.6  --  0.4      Coagulation:   Recent Labs   Lab 07/02/20  0354 07/03/20  0707   INR 1.4* 1.5*   APTT 29.6 27.2     LDH:  Recent Labs   Lab 07/02/20  0354 07/03/20  0707   * 645*     Microbiology:  Microbiology Results (last 7 days)     Procedure Component Value Units Date/Time    Aerobic culture [[id number]]  (Abnormal) Collected: 07/01/20 2153    Order Status: Completed Specimen: Wound from Abdomen Updated: 07/03/20 1016     Aerobic Bacterial Culture GRAM NEGATIVE LAURA  Moderate  Identification and susceptibility pending  No other  significant isolate      Narrative:      Left chest incision    Blood culture [[id number]] Collected: 07/03/20 0712    Order Status: Sent Specimen: Blood Updated: 07/03/20 0718    Blood culture [[id number]] Collected: 07/03/20 0711    Order Status: Sent Specimen: Blood Updated: 07/03/20 0718    Blood culture [[id number]] Collected: 07/02/20 1110    Order Status: Completed Specimen: Blood Updated: 07/02/20 1945     Blood Culture, Routine No Growth to date    Culture, Anaerobe [[id number]] Collected: 07/01/20 1741    Order Status: Completed Specimen: Incision site from Chest, Left Updated: 07/02/20 1109     Anaerobic Culture Culture in progress    Aerobic culture [[id number]] Collected: 07/01/20 1741    Order Status: Completed Specimen: Skin from Abdomen Updated: 07/02/20 0822     Aerobic Bacterial Culture No growth    Culture, Anaerobe [[id number]] Collected: 07/01/20 1741    Order Status: Completed Specimen: Skin from Abdomen Updated: 07/02/20 0733     Anaerobic Culture Culture in progress    Narrative:      Drive line site    Culture, Anaerobe [[id number]] Collected: 07/01/20 2153    Order Status: Sent Specimen: Wound from Abdomen Updated: 07/01/20 2212    Culture, Anaerobe [[id number]]     Order Status: Canceled Specimen: Skin from Abdomen     Aerobic culture [[id number]]     Order Status: Canceled Specimen: Skin from Abdomen     Aerobic culture [[id number]] Collected: 07/01/20 1741    Order Status: Canceled Specimen: Incision site from Chest, Left           BMP:   Recent Labs   Lab 07/03/20  0707   GLU 93      K 3.9      CO2 25   BUN 13   CREATININE 1.7*   CALCIUM 9.2   MG 2.0     Cardiac Markers: No results for input(s): CKMB, TROPONINT, MYOGLOBIN in the last 72 hours.  Coagulation:   Recent Labs   Lab 07/03/20  0707   INR 1.5*   APTT 27.2     I have reviewed all pertinent labs within the past 24 hours.    Estimated Creatinine Clearance: 68.8 mL/min (A) (based on SCr of 1.7 mg/dL (H)).    Diagnostic  Results:  I have reviewed all pertinent imaging results/findings within the past 24 hours.    Assessment and Plan:     53 yo AAM with DCM, HBP, CHF stage D s/p HM 2, ICD lead replacement 03/09/2020, hyperthyroidism,  sent in from clinic with a ICD pocket site infection. Patient wife reports yellow pus oozing from site this past week. Was prescribed antibiotics on Monday 6/29/20, and advised to come get admitted that day, but patient reported he didn't think it was necessary at that time. Denies fever, but reports intermittent nausea and chills, and Left side of Left hand tingling.      VAD parameters at baseline.  Pt speed decreased to 9800 rpms from 10,000 on 06/12/2020    ICD device check - 07/01/2020 --. Device interrogation revealed HV x 1 on 6/27/20 @ 3:41 pm for MMVT, Type 2 break.,CL-188 bpm. Patient reported sitting in car bending over when it occurred, not doing anything strenuous. Patient denies LOC. Patient reports overall feeling palpitations, tired, no energy, and COLEMAN, even prior to HV therapy.          * ICD (implantable cardioverter-defibrillator) infection  - presented with device pocket infection  - has been on oral doxycycline at home for the last 1 week with no improvement  - denies any fever but does admit to fatigue and chills   - labs pending   - ICD device check - 07/01/2020 --. Device interrogation revealed HV x 1 on 6/27/20 @ 3:41 pm for MMVT, Type 2 break.,CL-188 bpm.    Plan   - ID consulted who recommended IV vancomycin and IV ceftriaxone. C/w with recx  - will f/u blood and site cultures and based on the sensitivities will narrow the antibitoics  - EP informed who will plan an outpatient generator change and pocket site debridement.  - patient will stay over the weekend o get IV antibiotics and get the final results of his culture.       VT (ventricular tachycardia)  - device check showed a VT episode with shock on 06/27  - patient denies any LOC   - c/w amiodarone 200 mg daily.   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 LFT normal.  - INR 1.5 . Goal of 2-3   - given history of GI bleed in 2019 will just boost tonight with an extra dose of coumadin ( 7.5 mg)   - patient denies any dark urine and no obvious scleral icterus on exam  - drive site clean and dry but will send site cultures.    Procedure: Device Interrogation Including analysis of device parameters  Current Settings: Ventricular Assist Device  Review of device function is stable/unstable stable    TXP LVAD INTERROGATIONS 7/3/2020 7/3/2020 7/2/2020 7/2/2020 7/2/2020 7/2/2020 7/2/2020   Type HeartMate II HeartMate II HeartMate II HeartMate II HeartMate3 HeartMate II HeartMate II   Flow 4.6 4.9 5.0 4.9 5.4 5 4.9   Speed 9800 9800 9800 9800 9800 9800 9800   PI 3.9 3.9 3.1 3.2 4.5 4.4 4   Power (Jackson) 5.9 6.1 6.1 6.1 6.3 6.2 6.1   LSL 9400 9400 9400 9400 9400 9400 9400   Pulsatility No Pulse - - - Intermittent pulse Intermittent pulse No Pulse       NICM (nonischemic cardiomyopathy)  - currently mildly hypervolumic on exam  - c/w with home dose of torsemide  - keep K > 4 and Mg >2      Seen and d/w Dr. Lily Hopkins MD  Heart Transplant  [facility]

## 2020-07-03 NOTE — PROGRESS NOTES
[facility]  Infectious Disease  Progress Note    Patient Name: Tim Richards  MRN: [id number]  Admission Date: 7/1/2020  Length of Stay: 2 days  Attending Physician: Guerda Bautista MD  Primary Care Provider: Power Connors MD    Isolation Status: No active isolations  Assessment/Plan:      * ICD (implantable cardioverter-defibrillator) infection  Recurrent ICD pocket site infection following lead replacement 3/9/2020  Incision never fully closed    Aerobic Clx 07/01 growing GNR.    Recommendations:     1- F/u on wound cx.   2-Continue ceftriaxone and discontinue vancomycin.  3-May need PICC placement and continued IV abx at home vs PO abx, depending on wound cx.   4- If blood clx become positive, consider BRITTANY. Source control per EP plan for debridement/generator change/lead extraction as indicated.            Thank you for your consult. I will follow-up with patient. Please contact us if you have any additional questions.    Valeri Avalos MD  Infectious Disease  [facility]    Subjective:     Principal Problem:ICD (implantable cardioverter-defibrillator) infection    HPI: Tim Richards is a 54 y.o. male with past medical history of DCM, CHF stage D s/p HM2, VT with ICD and lead replacement on 3/9/2020 who presents with an ICD pocket infection. He reports surgical glue was used with the lead replacement, and the incision never fully closed. He had a previous infection since then in April and was treated with oral abx. Cultures negative at that time. He reports complete resolution of the infection after treatment at that time. However, over the past week he has noticed yellow non-odorous drainage from the site. He was prescribed oral doxycycline on 6/29/2020 and advised to get admitted for further treatment. He did not think it was necessary at that time and continued the doxycycline without improvement. He denies fever, aches, or chills. On 7/1/2020 evening, he  was started on IV vancomycin and cefriaxone and site cultures obtained.   Interval History: afebrile, denies any diarrhea , N/V. Aerobic clx growing GNR.    Review of Systems   Constitutional: Negative for activity change, appetite change, chills and fever.   HENT: Negative for congestion.    Eyes: Negative for pain and itching.   Respiratory: Negative for cough, chest tightness and shortness of breath.    Cardiovascular: Negative for chest pain, palpitations and leg swelling.   Gastrointestinal: Negative for abdominal pain and nausea.   Endocrine: Negative for polyphagia and polyuria.   Genitourinary: Negative for dysuria and frequency.   Musculoskeletal: Negative for back pain.   Neurological: Negative for numbness and headaches.   Psychiatric/Behavioral: Negative for agitation and behavioral problems.     Objective:     Vital Signs (Most Recent):  Temp: 98.1 °F (36.7 °C) (07/03/20 1516)  Pulse: 68 (07/03/20 1516)  Resp: 18 (07/03/20 1516)  BP: (!) 86/0 (07/03/20 1516)  SpO2: 95 % (07/03/20 1516) Vital Signs (24h Range):  Temp:  [97.7 °F (36.5 °C)-98.8 °F (37.1 °C)] 98.1 °F (36.7 °C)  Pulse:  [65-77] 68  Resp:  [16-18] 18  SpO2:  [93 %-99 %] 95 %  BP: (82-90)/(0) 86/0     Weight: 122.1 kg (269 lb 2.9 oz)  Body mass index is 35.51 kg/m².    Estimated Creatinine Clearance: 68.8 mL/min (A) (based on SCr of 1.7 mg/dL (H)).    Physical Exam  Constitutional:       Appearance: He is well-developed.   HENT:      Head: Normocephalic.   Cardiovascular:      Rate and Rhythm: Normal rate.      Heart sounds: Murmur present.      Comments: VAD- Left chest wall ICU with open wound and purlent discharge.  Pulmonary:      Effort: Pulmonary effort is normal.      Breath sounds: Normal breath sounds.   Abdominal:      General: Bowel sounds are normal. There is no distension.      Palpations: Abdomen is soft.      Tenderness: There is no abdominal tenderness.   Musculoskeletal: Normal range of motion.   Neurological:      Mental  Status: He is alert and oriented to person, place, and time.   Psychiatric:         Behavior: Behavior normal.         Significant Labs:   Blood Culture:   Recent Labs   Lab 03/11/20  0431 07/02/20  1110 07/03/20  0711 07/03/20  0712   LABBLOO No growth after 5 days.  No growth after 5 days. No Growth to date  No Growth to date No Growth to date No Growth to date     C4 Count: No results for input(s): C4 in the last 48 hours.  CMP:   Recent Labs   Lab 07/02/20  0354 07/03/20  0707 07/03/20  0709    136  --    K 3.5 3.9  --     102  --    CO2 25 25  --    GLU 81 93  --    BUN 13 13  --    CREATININE 1.9* 1.7*  --    CALCIUM 9.3 9.2  --    PROT 7.1  --  7.2   ALBUMIN 3.5  --  3.5   BILITOT 0.6  --  0.4   ALKPHOS 86  --  94   AST 31  --  27   ALT 17  --  19   ANIONGAP 10 9  --    EGFRNONAA 39.4* 45.0*  --      Urine Culture: No results for input(s): LABURIN in the last 4320 hours.  Urine Studies:   Recent Labs   Lab 03/11/20  0454   COLORU Yellow   APPEARANCEUA Cloudy*   PHUR 5.0   SPECGRAV 1.020   PROTEINUA Negative   GLUCUA Negative   KETONESU Negative   BILIRUBINUA Negative   OCCULTUA 1+*   NITRITE Negative   LEUKOCYTESUR Negative   RBCUA 2   WBCUA 2   BACTERIA Rare   HYALINECASTS 16*     Wound Culture:   Recent Labs   Lab 07/01/20  1741 07/01/20  2153   LABAERO No significant isolate GRAM NEGATIVE LAURA  Moderate  Identification and susceptibility pending  No other significant isolate  *     All pertinent labs within the past 24 hours have been reviewed.  None    Significant Imaging: I have reviewed all pertinent imaging results/findings within the past 24 hours.

## 2020-07-03 NOTE — SUBJECTIVE & OBJECTIVE
Interval HPI:   AAOX3.  Baseline hypotension from ICD.  No bradycardia, No hypothermia. NO evidence of Myxedema coma.  TSH 13 (Above goal) w/ FT4 0.9 WNL.      Overnight events: NINA   Eatin%  Nausea: No  Hypoglycemia and intervention: No  Fever: No  TPN and/or TF: No    PMH, PSH, FH, SH updated and reviewed     ROS:    Review of Systems   Constitutional: Negative for chills and fatigue.   HENT: Negative for rhinorrhea and sore throat.    Eyes: Negative for discharge and visual disturbance.   Respiratory: Negative for shortness of breath and wheezing.    Cardiovascular: Negative for chest pain and leg swelling.   Gastrointestinal: Negative for abdominal distention and abdominal pain.   Endocrine: Negative for cold intolerance and polydipsia.   Musculoskeletal: Negative for arthralgias and myalgias.   Skin: Negative for rash and wound.   Neurological: Negative for dizziness, weakness and light-headedness.   Psychiatric/Behavioral: Negative for agitation and sleep disturbance.            PHYSICAL EXAMINATION:  Vitals:    20 0726   BP: (!) 82/0   Pulse: 75   Resp: 16   Temp: 97.7 °F (36.5 °C)     Body mass index is 35.51 kg/m².    Physical Exam  Vitals signs reviewed.   Constitutional:       Appearance: He is well-developed.   HENT:      Right Ear: External ear normal.      Left Ear: External ear normal.      Nose: Nose normal.   Neck:      Thyroid: No thyromegaly.      Trachea: No tracheal deviation.   Cardiovascular:      Rate and Rhythm: Normal rate.      Heart sounds: No murmur.   Pulmonary:      Effort: Pulmonary effort is normal.      Breath sounds: Normal breath sounds.   Abdominal:      Palpations: Abdomen is soft. There is no mass.      Hernia: No hernia is present.   Skin:     Findings: No rash.   Neurological:      Mental Status: He is alert.      Cranial Nerves: No cranial nerve deficit.      Sensory: No sensory deficit.      Coordination: Coordination normal.   Psychiatric:         Judgment:  Judgment normal.

## 2020-07-03 NOTE — CONSULTS
[facility]  Endocrinology  Consult Note    Consult Requested by: Guerda Bautista MD   Reason for admit: ICD (implantable cardioverter-defibrillator) infection    HISTORY OF PRESENT ILLNESS:  55 YO Male w/ diagnosis of Amiodarone induced hyperthyroidism Type 2, HTN ,DCM,(EF 10%, grade III DD) s/p HM II with Outflow graft changed (03/9/18) as DT, BiV-Icd Medtronic (2014) and obesity that was admitted to [facility] [place] on 7/1/20 for ICD pocket sie infection.  During hospital course patient was found to have subclinical hypothyroidism pattern on labs. Pt was started on LT4 50mcg and consulted with endocrinology for management of hypothyroidism.     Pt following with Dr. Piedra form endocrinology who recommended on 6/22/20 tapering off prednisone and repeat TFT at beginning of July, if no improvement of TFT plan was to start LT4 50mcg daliy     Medications and/or Treatments Impacting Glycemic Control:  Immunotherapy:    Immunosuppressants     None        Steroids:   Hormones (From admission, onward)    Start     Stop Route Frequency Ordered    07/02/20 1745  predniSONE tablet 2.5 mg      -- Oral Daily 07/02/20 1742        Pressors:    Autonomic Drugs (From admission, onward)    None          Medications Prior to Admission   Medication Sig Dispense Refill Last Dose    allopurinol (ZYLOPRIM) 100 MG tablet TAKE 1 TABLET BY MOUTH EVERY DAY (Patient taking differently: Take 100 mg by mouth nightly. ) 30 tablet 5 6/30/2020 at 1930    amiodarone (PACERONE) 400 MG tablet Take 1 tablet (400 mg total) by mouth once daily. (Patient taking differently: Take 200 mg by mouth once daily. ) 30 tablet 11 7/1/2020 at 0530    amLODIPine (NORVASC) 10 MG tablet Take 1 tablet (10 mg total) by mouth once daily. 30 tablet 11 7/1/2020 at 0530    doxazosin (CARDURA) 8 MG Tab Take 1 tablet (8 mg total) by mouth every 12 (twelve) hours. 60 tablet 11 7/1/2020 at 0530    doxycycline (VIBRAMYCIN) 100 MG Cap Take 1 capsule (100 mg total)  by mouth 2 (two) times daily. for 10 days 20 capsule 0 7/1/2020 at 0530    ferrous gluconate 324 mg (37.5 mg iron) Tab tablet Take 1 tablet (324 mg total) by mouth daily with breakfast. (Patient taking differently: Take 324 mg by mouth daily with lunch. ) 30 tablet 11 7/1/2020 at 1400    guanFACINE (TENEX) 1 MG Tab Take 1 tablet (1 mg total) by mouth every evening. 30 tablet 5 6/30/2020 at 1930    pantoprazole (PROTONIX) 40 MG tablet TAKE 1 TABLET (40 MG TOTAL) BY MOUTH ONCE DAILY. (Patient taking differently: Take 40 mg by mouth daily with lunch. ) 90 tablet 3 7/1/2020 at 1400    paroxetine (PAXIL) 10 MG tablet Take 1 tablet (10 mg total) by mouth every morning. 30 tablet 3 7/1/2020 at 0530    potassium chloride SA (K-DUR,KLOR-CON) 20 MEQ tablet Take 20 mEq by mouth 2 (two) times daily.   7/1/2020 at 0530    predniSONE (DELTASONE) 2.5 MG tablet Take 3 tablets (7.5 mg total) by mouth once daily for 10 days, THEN 2 tablets (5 mg total) once daily for 10 days, THEN 1 tablet (2.5 mg total) once daily for 15 days. Then discontinue.. 65 tablet 0 7/1/2020 at 0530    spironolactone (ALDACTONE) 25 MG tablet Take 1 tablet (25 mg total) by mouth once daily. (Patient taking differently: Take 25 mg by mouth daily with lunch. ) 30 tablet 11 7/1/2020 at 1400    torsemide (DEMADEX) 20 MG Tab TAKE 2 TABS BY MOUTH EVERY MORNING AND 1 TAB EVERY EVENING FOR 3 DAYS THEN 2 TABS DAILY THEREAFTER (Patient taking differently: Take 40 mg by mouth once daily. TAKE 2 TABS BY MOUTH EVERY MORNING AND 1 TAB EVERY EVENING FOR 3 DAYS THEN 2 TABS DAILY THEREAFTER) 180 tablet 1 7/1/2020 at 0530    vitamin D (VITAMIN D3) 1000 units Tab Take 1,000 Units by mouth once daily.   7/1/2020 at 0530    zolpidem (AMBIEN) 10 mg Tab Take 1 tablet (10 mg total) by mouth nightly as needed. 30 tablet 5 Past Week at Unknown time    aspirin (ECOTRIN) 81 MG EC tablet Take 1 tablet (81 mg total) by mouth once daily. 100 tablet 3 More than a month at Unknown  time    fluticasone propionate (FLONASE) 50 mcg/actuation nasal spray 2 sprays (100 mcg total) by Each Nostril route once daily. 16 g 3 More than a month at Unknown time    sildenafiL (VIAGRA) 100 MG tablet Take 1 tablet (100 mg total) by mouth daily as needed for Erectile Dysfunction. 10 tablet 1 More than a month at Unknown time    warfarin (COUMADIN) 5 MG tablet Take 5mg orally daily except 2.5mg orally on Monday / Fridays (Patient taking differently: Take 2.5 mg by mouth. Take 5mg orally daily except 2.5mg orally on Wednesdays and Saturdays) 30 tablet 11        Current Facility-Administered Medications   Medication Dose Route Frequency Provider Last Rate Last Dose    allopurinoL tablet 100 mg  100 mg Oral Daily Alonzo Bang MD   100 mg at 07/03/20 0901    amiodarone tablet 200 mg  200 mg Oral Daily Alonzo Bang MD   200 mg at 07/03/20 0901    amLODIPine tablet 10 mg  10 mg Oral Daily Alonzo Bang MD   10 mg at 07/03/20 0901    aspirin EC tablet 81 mg  81 mg Oral Daily Alonzo Bang MD   81 mg at 07/03/20 0849    cefTRIAXone (ROCEPHIN) 2 g in dextrose 5 % 50 mL IVPB (ready to mix system)  2 g Intravenous Q24H Alonzo Bang MD   2 g at 07/02/20 1754    doxazosin tablet 8 mg  8 mg Oral Q12H Alonzo Bang MD   8 mg at 07/03/20 0901    ferrous gluconate tablet 324 mg  324 mg Oral Daily with lunch Alonzo Bang MD   324 mg at 07/02/20 1229    fluticasone propionate 50 mcg/actuation nasal spray 100 mcg  2 spray Each Nostril Daily Alonzo Bang MD        guanFACINE tablet 1 mg  1 mg Oral QHS Alonzo Bang MD   1 mg at 07/02/20 2121    levothyroxine tablet 50 mcg  50 mcg Oral Before breakfast Rebecca Hopkins MD   50 mcg at 07/03/20 0901    pantoprazole EC tablet 40 mg  40 mg Oral Daily with lunch Alonzo Bang MD   40 mg at 07/02/20 1229    paroxetine tablet 10 mg  10 mg Oral QAM Alonzo Bang MD   10 mg at 07/03/20 0901    potassium chloride SA CR tablet 20 mEq  20 mEq  Oral BID Alonzo Bang MD   20 mEq at 20 0849    predniSONE tablet 2.5 mg  2.5 mg Oral Daily Mallory Bowser MD   2.5 mg at 20 0849    spironolactone tablet 25 mg  25 mg Oral Daily with lunch Alonzo Bang MD   25 mg at 20 1229    torsemide tablet 40 mg  40 mg Oral Daily Alonzo Bang MD   40 mg at 20 0900    vancomycin - pharmacy to dose   Intravenous pharmacy to manage frequency Alonzo Bang MD        vancomycin in dextrose 5 % 1 gram/250 mL IVPB 1,000 mg  1,000 mg Intravenous Q12H Guerda Bautista .7 mL/hr at 20 0902 1,000 mg at 20 0902    vitamin D 1000 units tablet 1,000 Units  1,000 Units Oral Daily Alonzo Bang MD   1,000 Units at 20 0901    zolpidem tablet 10 mg  10 mg Oral Nightly PRN Alonzo Bang MD   10 mg at 20 2122       Interval HPI:   AAOX3.  Baseline hypotension from ICD.  No bradycardia, No hypothermia. NO evidence of Myxedema coma.  TSH 13 (Above goal) w/ FT4 0.9 WNL.      Overnight events: NINA   Eatin%  Nausea: No  Hypoglycemia and intervention: No  Fever: No  TPN and/or TF: No    PMH, PSH, FH, SH updated and reviewed     ROS:    Review of Systems   Constitutional: Negative for chills and fatigue.   HENT: Negative for rhinorrhea and sore throat.    Eyes: Negative for discharge and visual disturbance.   Respiratory: Negative for shortness of breath and wheezing.    Cardiovascular: Negative for chest pain and leg swelling.   Gastrointestinal: Negative for abdominal distention and abdominal pain.   Endocrine: Negative for cold intolerance and polydipsia.   Musculoskeletal: Negative for arthralgias and myalgias.   Skin: Negative for rash and wound.   Neurological: Negative for dizziness, weakness and light-headedness.   Psychiatric/Behavioral: Negative for agitation and sleep disturbance.            PHYSICAL EXAMINATION:  Vitals:    20 0726   BP: (!) 82/0   Pulse: 75   Resp: 16   Temp: 97.7 °F (36.5 °C)      Body mass index is 35.51 kg/m².    Physical Exam  Vitals signs reviewed.   Constitutional:       Appearance: He is well-developed.   HENT:      Right Ear: External ear normal.      Left Ear: External ear normal.      Nose: Nose normal.   Neck:      Thyroid: No thyromegaly.      Trachea: No tracheal deviation.   Cardiovascular:      Rate and Rhythm: Normal rate.      Heart sounds: No murmur.   Pulmonary:      Effort: Pulmonary effort is normal.      Breath sounds: Normal breath sounds.   Abdominal:      Palpations: Abdomen is soft. There is no mass.      Hernia: No hernia is present.   Skin:     Findings: No rash.   Neurological:      Mental Status: He is alert.      Cranial Nerves: No cranial nerve deficit.      Sensory: No sensory deficit.      Coordination: Coordination normal.   Psychiatric:         Judgment: Judgment normal.       .     ASSESSMENT and PLAN:    * ICD (implantable cardioverter-defibrillator) infection  -On IV Abx   -Managed by primary team   -ID recs       Other specified hypothyroidism  -Pt with Amiodarone induced hyperthyroidism type 2, NOW with Hypothyroidism   -Currently on Amiodarone 200mg daily   -Currently off Methimazole since 5/20   -Currently on prednisone taper @ 2.5mg daily x 15 days total   -TSH 13 (Above goal)   -FT4 0.9 (WNL)   -Labs consistent with subclinical hypothyroidism   -Clinically Euthyroid   -No  Bradycardia or hypothermia, NO evidence of Myxedema coma     Plan:   -DC Levothyroxine 50mcg PO daily   -C/w Prednisone taper 2.5mg for a total of 15 days   -Repeat TFT in 2 WEEKS outpatient.  If TSH elevated will start LT4 50mcg at that time     ENDOCRINE WILL SIGN OFF,  WILL SCHEDULE FOLLOW UP AND TFTs       LVAD (left ventricular assist device) present  -Managed by primary team   -Will be conservative with Synthroid dose due to cardiac history       VT (ventricular tachycardia)  -S/p ICD  -On amiodarone   -Management per primary team           DISCHARGE NEEDS: will assess  daily    Dalton Chance MD  Endocrinology  [facility]

## 2020-07-03 NOTE — ASSESSMENT & PLAN NOTE
BiV-Icd Medtronic (2014 Dr. Chiu), successful DFT at 35J on 10/18/2019. Multiple VT episodes,  generator change (BiV ICD -> dual ICD),  device revision (addition of a new RV/ICD lead) and another VF induction via ICD on 3/2020. Presented from device clinic with device erosion.       - Blood cultured negative to date- awaiting final results  - Antibiotic regimen per ID recommendations (currently on Rocephin/ Vancomycin)  - May need PICC placement and continued IV abx at home vs PO abx, depending on wound cx, awaiting final ID recommendations  - Continue current device setting and current antiarrythmic agents   - Plan to evaluate for device extraction in near future   - When antibiotics is concluded (per ID recommendations), will consider an alternative method of ICD implantation   - CT surgery consulted for evaluation back up recommendations regarding device extraction

## 2020-07-03 NOTE — PLAN OF CARE
Interval update:     Discussed with ID team, patient growing GNC in blood. Okay to DC vancomycin. Continue Ceftriaxone.     Howie Lizama M.D.  Page # [phone]  Cardiovascular Fellow PGY-V  [facility]

## 2020-07-03 NOTE — ASSESSMENT & PLAN NOTE
-Pt with Amiodarone induced hyperthyroidism type 2, NOW with Hypothyroidism   -Currently on Amiodarone 200mg daily   -Currently off Methimazole since 5/20   -Currently on prednisone taper @ 2.5mg daily x 15 days total   -TSH 13 (Above goal)   -FT4 0.9 (WNL)   -Labs consistent with subclinical hypothyroidism   -Clinically Euthyroid   -No  Bradycardia or hypothermia, NO evidence of Myxedema coma     Plan:   -Start Levothyroxine 50mcg PO daily   -C/w Prednisone taper 2.5mg for a total of 15 days   -Repeat TFT in 4 weeks as outpatient     ENDOCRINE WILL SIGN OFF,  WILL SCHEDULE FOLLOW UP AND TFTs

## 2020-07-03 NOTE — PROGRESS NOTES
Pharmacokinetic Assessment Follow Up: IV Vancomycin    Vancomycin serum concentration assessment(s):    The trough level was drawn correctly and can be used to guide therapy at this time. The measurement is within the desired definitive target range of 10 to 20 mcg/mL.    Vancomycin Regimen Plan:    Continue regimen to Vancomycin 1000 mg IV every 12 hours with next serum trough concentration measured at 09:00 prior to 4th dose on 7/5/20    Drug levels (last 3 results):  Recent Labs   Lab Result Units 07/03/20  0707   Vancomycin, Random ug/mL 18.7       Pharmacy will continue to follow and monitor vancomycin.    Please contact pharmacy at extension [phone] for questions regarding this assessment.    Thank you for the consult,   Bro Grant       Patient brief summary:  Tim Richards is a 54 y.o. male initiated on antimicrobial therapy with IV Vancomycin for treatment of ICD pocket infection with no systemic illness.     The patient's current regimen is Vancomycin 1 gm IV q12h    Drug Allergies:   Review of patient's allergies indicates:   Allergen Reactions    Lisinopril Anaphylaxis    Hydralazine analogues      Chronic constipation, impotence, dizziness       Actual Body Weight:   122.1 kg    Renal Function:   Estimated Creatinine Clearance: 68.8 mL/min (A) (based on SCr of 1.7 mg/dL (H)).,     Dialysis Method (if applicable):  none    CBC (last 72 hours):  Recent Labs   Lab Result Units 07/02/20  0354 07/03/20  0707   WBC K/uL 5.60 6.23   Hemoglobin g/dL 12.5* 12.7*   Hematocrit % 41.7 42.6   Platelets K/uL 223 231   Gran% % 66.6 73.3*   Lymph% % 15.7* 12.8*   Mono% % 14.5 11.4   Eosinophil% % 2.0 1.4   Basophil% % 0.5 0.5   Differential Method  Automated Automated       Metabolic Panel (last 72 hours):  Recent Labs   Lab Result Units 07/02/20  0354 07/03/20  0707 07/03/20  0709   Sodium mmol/L 138 136  --    Potassium mmol/L 3.5 3.9  --    Chloride mmol/L 103 102  --    CO2 mmol/L 25 25  --    Glucose mg/dL 81  93  --    BUN, Bld mg/dL 13 13  --    Creatinine mg/dL 1.9* 1.7*  --    Albumin g/dL 3.5  --  3.5   Total Bilirubin mg/dL 0.6  --  0.4   Alkaline Phosphatase U/L 86  --  94   AST U/L 31  --  27   ALT U/L 17  --  19   Magnesium mg/dL 2.0 2.0  --    Phosphorus mg/dL 3.3 3.3  --        Vancomycin Administrations:  vancomycin given in the last 96 hours                   vancomycin in dextrose 5 % 1 gram/250 mL IVPB 1,000 mg (mg) 1,000 mg New Bag 07/03/20 0902     1,000 mg New Bag 07/02/20 2121     1,000 mg New Bag  0902    vancomycin 1.75 g in 5 % dextrose 500 mL IVPB (mg) 1,750 mg New Bag 07/01/20 2132                Microbiologic Results:  Microbiology Results (last 7 days)     Procedure Component Value Units Date/Time    Aerobic culture [[id number]]  (Abnormal) Collected: 07/01/20 2153    Order Status: Completed Specimen: Wound from Abdomen Updated: 07/03/20 1016     Aerobic Bacterial Culture GRAM NEGATIVE LAURA  Moderate  Identification and susceptibility pending  No other significant isolate      Narrative:      Left chest incision    Blood culture [[id number]] Collected: 07/03/20 0712    Order Status: Sent Specimen: Blood Updated: 07/03/20 0718    Blood culture [[id number]] Collected: 07/03/20 0711    Order Status: Sent Specimen: Blood Updated: 07/03/20 0718    Blood culture [[id number]] Collected: 07/02/20 1110    Order Status: Completed Specimen: Blood Updated: 07/02/20 1945     Blood Culture, Routine No Growth to date    Culture, Anaerobe [[id number]] Collected: 07/01/20 1741    Order Status: Completed Specimen: Incision site from Chest, Left Updated: 07/02/20 1109     Anaerobic Culture Culture in progress    Aerobic culture [[id number]] Collected: 07/01/20 1741    Order Status: Completed Specimen: Skin from Abdomen Updated: 07/02/20 0822     Aerobic Bacterial Culture No growth    Culture, Anaerobe [[id number]] Collected: 07/01/20 1741    Order Status: Completed Specimen: Skin from Abdomen Updated: 07/02/20 0733      Anaerobic Culture Culture in progress    Narrative:      Drive line site    Culture, Anaerobe [[id number]] Collected: 07/01/20 2153    Order Status: Sent Specimen: Wound from Abdomen Updated: 07/01/20 2212    Culture, Anaerobe [[id number]]     Order Status: Canceled Specimen: Skin from Abdomen     Aerobic culture [[id number]]     Order Status: Canceled Specimen: Skin from Abdomen     Aerobic culture [[id number]] Collected: 07/01/20 1741    Order Status: Canceled Specimen: Incision site from Chest, Left

## 2020-07-04 LAB
ANION GAP SERPL CALC-SCNC: 11 MMOL/L (ref 8–16)
APTT BLDCRRT: 31.1 SEC (ref 21–32)
BACTERIA SPEC AEROBE CULT: ABNORMAL
BASOPHILS # BLD AUTO: 0.02 K/UL (ref 0–0.2)
BASOPHILS NFR BLD: 0.3 % (ref 0–1.9)
BUN SERPL-MCNC: 12 MG/DL (ref 6–20)
CALCIUM SERPL-MCNC: 9.6 MG/DL (ref 8.7–10.5)
CHLORIDE SERPL-SCNC: 100 MMOL/L (ref 95–110)
CO2 SERPL-SCNC: 26 MMOL/L (ref 23–29)
CREAT SERPL-MCNC: 1.7 MG/DL (ref 0.5–1.4)
DIFFERENTIAL METHOD: ABNORMAL
EOSINOPHIL # BLD AUTO: 0.1 K/UL (ref 0–0.5)
EOSINOPHIL NFR BLD: 1.9 % (ref 0–8)
ERYTHROCYTE [DISTWIDTH] IN BLOOD BY AUTOMATED COUNT: 15.5 % (ref 11.5–14.5)
EST. GFR  (AFRICAN AMERICAN): 52.1 ML/MIN/1.73 M^2
EST. GFR  (NON AFRICAN AMERICAN): 45 ML/MIN/1.73 M^2
GLUCOSE SERPL-MCNC: 78 MG/DL (ref 70–110)
HCT VFR BLD AUTO: 44 % (ref 40–54)
HGB BLD-MCNC: 13.3 G/DL (ref 14–18)
IMM GRANULOCYTES # BLD AUTO: 0.05 K/UL (ref 0–0.04)
IMM GRANULOCYTES NFR BLD AUTO: 0.8 % (ref 0–0.5)
INR PPP: 1.5 (ref 0.8–1.2)
LDH SERPL L TO P-CCNC: 642 U/L (ref 110–260)
LYMPHOCYTES # BLD AUTO: 0.8 K/UL (ref 1–4.8)
LYMPHOCYTES NFR BLD: 13.2 % (ref 18–48)
MAGNESIUM SERPL-MCNC: 2 MG/DL (ref 1.6–2.6)
MCH RBC QN AUTO: 26.8 PG (ref 27–31)
MCHC RBC AUTO-ENTMCNC: 30.2 G/DL (ref 32–36)
MCV RBC AUTO: 89 FL (ref 82–98)
MONOCYTES # BLD AUTO: 0.8 K/UL (ref 0.3–1)
MONOCYTES NFR BLD: 13.5 % (ref 4–15)
NEUTROPHILS # BLD AUTO: 4.4 K/UL (ref 1.8–7.7)
NEUTROPHILS NFR BLD: 70.3 % (ref 38–73)
NRBC BLD-RTO: 0 /100 WBC
PHOSPHATE SERPL-MCNC: 3.1 MG/DL (ref 2.7–4.5)
PLATELET # BLD AUTO: 244 K/UL (ref 150–350)
PMV BLD AUTO: 10.9 FL (ref 9.2–12.9)
POTASSIUM SERPL-SCNC: 3.7 MMOL/L (ref 3.5–5.1)
PROTHROMBIN TIME: 14.8 SEC (ref 9–12.5)
RBC # BLD AUTO: 4.96 M/UL (ref 4.6–6.2)
SODIUM SERPL-SCNC: 137 MMOL/L (ref 136–145)
WBC # BLD AUTO: 6.21 K/UL (ref 3.9–12.7)

## 2020-07-04 PROCEDURE — 84100 ASSAY OF PHOSPHORUS: CPT

## 2020-07-04 PROCEDURE — 99233 SBSQ HOSP IP/OBS HIGH 50: CPT | Mod: ,,, | Performed by: INTERNAL MEDICINE

## 2020-07-04 PROCEDURE — 83735 ASSAY OF MAGNESIUM: CPT

## 2020-07-04 PROCEDURE — 85730 THROMBOPLASTIN TIME PARTIAL: CPT

## 2020-07-04 PROCEDURE — 25000003 PHARM REV CODE 250

## 2020-07-04 PROCEDURE — 83615 LACTATE (LD) (LDH) ENZYME: CPT

## 2020-07-04 PROCEDURE — 63600175 PHARM REV CODE 636 W HCPCS: Performed by: INTERNAL MEDICINE

## 2020-07-04 PROCEDURE — 27000248 HC VAD-ADDITIONAL DAY

## 2020-07-04 PROCEDURE — 80048 BASIC METABOLIC PNL TOTAL CA: CPT

## 2020-07-04 PROCEDURE — 93750 PR INTERROGATE VENT ASSIST DEV, IN PERSON, W PHYSICIAN ANALYSIS: ICD-10-PCS | Mod: ,,, | Performed by: INTERNAL MEDICINE

## 2020-07-04 PROCEDURE — 25000003 PHARM REV CODE 250: Performed by: INTERNAL MEDICINE

## 2020-07-04 PROCEDURE — 85610 PROTHROMBIN TIME: CPT

## 2020-07-04 PROCEDURE — 20600001 HC STEP DOWN PRIVATE ROOM

## 2020-07-04 PROCEDURE — 94761 N-INVAS EAR/PLS OXIMETRY MLT: CPT

## 2020-07-04 PROCEDURE — 99233 PR SUBSEQUENT HOSPITAL CARE,LEVL III: ICD-10-PCS | Mod: ,,, | Performed by: INTERNAL MEDICINE

## 2020-07-04 PROCEDURE — 25000003 PHARM REV CODE 250: Performed by: STUDENT IN AN ORGANIZED HEALTH CARE EDUCATION/TRAINING PROGRAM

## 2020-07-04 PROCEDURE — 85025 COMPLETE CBC W/AUTO DIFF WBC: CPT

## 2020-07-04 PROCEDURE — 63600175 PHARM REV CODE 636 W HCPCS: Performed by: STUDENT IN AN ORGANIZED HEALTH CARE EDUCATION/TRAINING PROGRAM

## 2020-07-04 PROCEDURE — 36415 COLL VENOUS BLD VENIPUNCTURE: CPT

## 2020-07-04 PROCEDURE — 93750 INTERROGATION VAD IN PERSON: CPT | Mod: ,,, | Performed by: INTERNAL MEDICINE

## 2020-07-04 RX ORDER — ACETAMINOPHEN 325 MG/1
650 TABLET ORAL EVERY 6 HOURS PRN
Status: DISCONTINUED | OUTPATIENT
Start: 2020-07-04 | End: 2020-07-14 | Stop reason: HOSPADM

## 2020-07-04 RX ORDER — TORSEMIDE 20 MG/1
20 TABLET ORAL DAILY
Status: DISCONTINUED | OUTPATIENT
Start: 2020-07-06 | End: 2020-07-10

## 2020-07-04 RX ORDER — SYRING-NEEDL,DISP,INSUL,0.3 ML 29 G X1/2"
296 SYRINGE, EMPTY DISPOSABLE MISCELLANEOUS ONCE
Status: DISCONTINUED | OUTPATIENT
Start: 2020-07-04 | End: 2020-07-07

## 2020-07-04 RX ORDER — WARFARIN SODIUM 5 MG/1
10 TABLET ORAL DAILY
Status: DISCONTINUED | OUTPATIENT
Start: 2020-07-04 | End: 2020-07-05

## 2020-07-04 RX ORDER — SPIRONOLACTONE 25 MG/1
25 TABLET ORAL
Status: DISCONTINUED | OUTPATIENT
Start: 2020-07-05 | End: 2020-07-11

## 2020-07-04 RX ORDER — CEFEPIME HYDROCHLORIDE 1 G/1
1 INJECTION, POWDER, FOR SOLUTION INTRAMUSCULAR; INTRAVENOUS
Status: DISCONTINUED | OUTPATIENT
Start: 2020-07-04 | End: 2020-07-14 | Stop reason: HOSPADM

## 2020-07-04 RX ORDER — TORSEMIDE 20 MG/1
20 TABLET ORAL DAILY
Status: DISCONTINUED | OUTPATIENT
Start: 2020-07-05 | End: 2020-07-04

## 2020-07-04 RX ADMIN — GUANFACINE 1 MG: 1 TABLET ORAL at 08:07

## 2020-07-04 RX ADMIN — PAROXETINE HYDROCHLORIDE 10 MG: 10 TABLET, FILM COATED ORAL at 06:07

## 2020-07-04 RX ADMIN — PREDNISONE 2.5 MG: 2.5 TABLET ORAL at 08:07

## 2020-07-04 RX ADMIN — DOXAZOSIN 8 MG: 8 TABLET ORAL at 08:07

## 2020-07-04 RX ADMIN — POTASSIUM CHLORIDE 20 MEQ: 1500 TABLET, EXTENDED RELEASE ORAL at 08:07

## 2020-07-04 RX ADMIN — MELATONIN 1000 UNITS: at 08:07

## 2020-07-04 RX ADMIN — PANTOPRAZOLE SODIUM 40 MG: 40 TABLET, DELAYED RELEASE ORAL at 01:07

## 2020-07-04 RX ADMIN — AMIODARONE HYDROCHLORIDE 200 MG: 200 TABLET ORAL at 08:07

## 2020-07-04 RX ADMIN — SODIUM CHLORIDE 250 ML: 0.9 INJECTION, SOLUTION INTRAVENOUS at 09:07

## 2020-07-04 RX ADMIN — TORSEMIDE 40 MG: 20 TABLET ORAL at 08:07

## 2020-07-04 RX ADMIN — ASPIRIN 81 MG: 81 TABLET, COATED ORAL at 08:07

## 2020-07-04 RX ADMIN — ZOLPIDEM TARTRATE 10 MG: 5 TABLET ORAL at 08:07

## 2020-07-04 RX ADMIN — ALLOPURINOL 100 MG: 100 TABLET ORAL at 08:07

## 2020-07-04 RX ADMIN — AMLODIPINE BESYLATE 10 MG: 10 TABLET ORAL at 08:07

## 2020-07-04 RX ADMIN — CEFEPIME 1 G: 1 INJECTION, POWDER, FOR SOLUTION INTRAMUSCULAR; INTRAVENOUS at 03:07

## 2020-07-04 RX ADMIN — FERROUS GLUCONATE TAB 324 MG (37.5 MG ELEMENTAL IRON) 324 MG: 324 (37.5 FE) TAB at 01:07

## 2020-07-04 RX ADMIN — WARFARIN SODIUM 10 MG: 5 TABLET ORAL at 04:07

## 2020-07-04 RX ADMIN — CEFEPIME 1 G: 1 INJECTION, POWDER, FOR SOLUTION INTRAMUSCULAR; INTRAVENOUS at 11:07

## 2020-07-04 NOTE — SUBJECTIVE & OBJECTIVE
Interval History:   - Patient seen and examined today at bedside.  - No active cardiac complaints at this time.   - Growing GNB in wound cultures, Blood cultures remain negative to date. Vanc was discontinued, now on Cefriazone only  - dressing to left chest wall is CDI, non tender around area. Kit dressing changed yesterday, plan to change Sunday again   - He is getting diuresing with torsemide, K 3.9 replaced. INR 1.5      ROS  Objective:     Vital Signs (Most Recent):  Temp: 97.7 °F (36.5 °C) (07/04/20 0801)  Pulse: 81 (07/04/20 1052)  Resp: 16 (07/04/20 0801)  BP: (!) 80/0 (07/04/20 0801)  SpO2: 96 % (07/04/20 0801) Vital Signs (24h Range):  Temp:  [96.5 °F (35.8 °C)-98.8 °F (37.1 °C)] 97.7 °F (36.5 °C)  Pulse:  [66-95] 81  Resp:  [16-18] 16  SpO2:  [95 %-99 %] 96 %  BP: (78-90)/(0) 80/0     Weight: 122.3 kg (269 lb 10 oz)  Body mass index is 35.57 kg/m².     SpO2: 96 %  O2 Device (Oxygen Therapy): room air    Physical Exam   Constitutional: He is oriented to person, place, and time. He appears well-developed and well-nourished. No distress.   HENT:   Head: Normocephalic.   Left Ear: External ear normal.   Eyes: Pupils are equal, round, and reactive to light. Right eye exhibits no discharge. Left eye exhibits no discharge.   Neck: Normal range of motion. No thyromegaly present.   Cardiovascular: Normal rate and regular rhythm. Exam reveals no friction rub.   Murmur heard.  Pulmonary/Chest: Effort normal and breath sounds normal. No respiratory distress.   Abdominal: Soft. Bowel sounds are normal. He exhibits distension. There is no abdominal tenderness.   Musculoskeletal: Normal range of motion.         General: No edema.   Neurological: He is alert and oriented to person, place, and time.   Skin: Rash noted. There is erythema.       Significant Labs:   CMP:   Recent Labs   Lab 07/03/20  0707 07/03/20  0709 07/04/20  0513     --  137   K 3.9  --  3.7     --  100   CO2 25  --  26   GLU 93  --  78    BUN 13  --  12   CREATININE 1.7*  --  1.7*   CALCIUM 9.2  --  9.6   PROT  --  7.2  --    ALBUMIN  --  3.5  --    BILITOT  --  0.4  --    ALKPHOS  --  94  --    AST  --  27  --    ALT  --  19  --    ANIONGAP 9  --  11   ESTGFRAFRICA 52.1*  --  52.1*   EGFRNONAA 45.0*  --  45.0*   , CBC:   Recent Labs   Lab 07/03/20  0707 07/04/20 0513   WBC 6.23 6.21   HGB 12.7* 13.3*   HCT 42.6 44.0    244    and INR:   Recent Labs   Lab 07/03/20  0707 07/04/20 0513   INR 1.5* 1.5*

## 2020-07-04 NOTE — SUBJECTIVE & OBJECTIVE
Interval History:     Review of Systems   Constitutional: Negative for chills, fatigue and fever.   HENT: Negative for mouth sores and sore throat.    Respiratory: Negative for shortness of breath and wheezing.    Cardiovascular: Negative for chest pain.   Gastrointestinal: Negative for abdominal distention, abdominal pain, diarrhea, nausea and vomiting.   Musculoskeletal: Negative for myalgias.   Skin: Positive for wound. Negative for color change.   Neurological: Negative for dizziness and weakness.     Objective:     Vital Signs (Most Recent):  Temp: 97.7 °F (36.5 °C) (07/04/20 0801)  Pulse: 81 (07/04/20 1052)  Resp: 16 (07/04/20 0801)  BP: (!) 80/0 (07/04/20 0801)  SpO2: 96 % (07/04/20 0801) Vital Signs (24h Range):  Temp:  [96.5 °F (35.8 °C)-98.8 °F (37.1 °C)] 97.7 °F (36.5 °C)  Pulse:  [66-95] 81  Resp:  [16-18] 16  SpO2:  [95 %-99 %] 96 %  BP: (78-90)/(0) 80/0     Weight: 122.3 kg (269 lb 10 oz)  Body mass index is 35.57 kg/m².    Estimated Creatinine Clearance: 68.9 mL/min (A) (based on SCr of 1.7 mg/dL (H)).    Physical Exam  Constitutional:       Appearance: He is obese.   HENT:      Head: Normocephalic and atraumatic.      Mouth/Throat:      Mouth: Mucous membranes are moist.      Pharynx: Oropharynx is clear.   Eyes:      General: No scleral icterus.     Extraocular Movements: Extraocular movements intact.   Neck:      Musculoskeletal: Neck supple.   Cardiovascular:      Comments: VAD hum  Pulmonary:      Effort: Pulmonary effort is normal.      Breath sounds: Normal breath sounds.   Abdominal:      General: Bowel sounds are normal.      Palpations: Abdomen is soft.      Tenderness: There is no abdominal tenderness.   Skin:     General: Skin is warm and dry.      Capillary Refill: Capillary refill takes less than 2 seconds.      Findings: Lesion present.      Comments: Opaque tan draining noted under ICD site dressing, surrounding area warm   Neurological:      General: No focal deficit present.       Mental Status: He is alert and oriented to person, place, and time. Mental status is at baseline.   Psychiatric:         Mood and Affect: Mood normal.         Behavior: Behavior normal.         Thought Content: Thought content normal.         Judgment: Judgment normal.         Significant Labs:   Blood Culture:   Recent Labs   Lab 03/11/20  0431 07/02/20  1110 07/03/20  0711 07/03/20  0712   LABBLOO No growth after 5 days.  No growth after 5 days. No Growth to date  No Growth to date No Growth to date  No Growth to date No Growth to date  No Growth to date     CBC:   Recent Labs   Lab 07/03/20  0707 07/04/20  0513   WBC 6.23 6.21   HGB 12.7* 13.3*   HCT 42.6 44.0    244     CMP:   Recent Labs   Lab 07/03/20  0707 07/03/20  0709 07/04/20  0513     --  137   K 3.9  --  3.7     --  100   CO2 25  --  26   GLU 93  --  78   BUN 13  --  12   CREATININE 1.7*  --  1.7*   CALCIUM 9.2  --  9.6   PROT  --  7.2  --    ALBUMIN  --  3.5  --    BILITOT  --  0.4  --    ALKPHOS  --  94  --    AST  --  27  --    ALT  --  19  --    ANIONGAP 9  --  11   EGFRNONAA 45.0*  --  45.0*     Wound Culture:   Recent Labs   Lab 07/01/20  1741 07/01/20  2153   LABAERO No significant isolate GRAM NEGATIVE LAURA  Moderate  Identification and susceptibility pending  No other significant isolate  *       Significant Imaging: I have reviewed all pertinent imaging results/findings within the past 24 hours.

## 2020-07-04 NOTE — ASSESSMENT & PLAN NOTE
LDH in 640 - will give 250 ml fluids Repeat TTE. LFT normal.  Will c/w coumadin. INR 1.5 today. Coumadin 10 mg. Given risk of GI bleed, patient not a candidate for heparin bridge. Also given concern risk of bleeding, hold off on starting persantine. C/W ASA 81 mg daily. Likely LDH elevation as an acute phase reactant from infection.

## 2020-07-04 NOTE — SUBJECTIVE & OBJECTIVE
Interval History: Patient seen and examined today at bedside.  no new complaints. Wound c/s positive for GNB. Blood c/s negative. Slight drainage at ICD pocket site. Dressing in place, not much tender. ID f/u appreciated- DC Vancomycin, s/w ceftriaxone. As per ID if repeat positive, consider BRITTANY.  EP - plan for ICD removal, CTS consult for input and back up plan. EP follow up appreciated.  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   LDH in 640 - will give 250 ml fluids Repeat TTE. LFT normal.  Will c/w coumadin. INR 1.5 today. Coumadin 10 mg. Given risk of GI bleed, patient not a candidate for heparin bridge. Also given concern risk of bleeding, hold off on starting persantine. C/W ASA 81 mg daily. Likely LDH elevation as an acute phase reactant from infection.            Continuous Infusions:  Scheduled Meds:   allopurinoL  100 mg Oral Daily    amiodarone  200 mg Oral Daily    amLODIPine  10 mg Oral Daily    aspirin  81 mg Oral Daily    cefTRIAXone (ROCEPHIN) 2 g in dextrose 5 % 50 mL IVPB (ready to mix system)  2 g Intravenous Q24H    doxazosin  8 mg Oral Q12H    ferrous gluconate  324 mg Oral Daily with lunch    fluticasone propionate  2 spray Each Nostril Daily    guanFACINE  1 mg Oral QHS    pantoprazole  40 mg Oral Daily with lunch    paroxetine  10 mg Oral QAM    potassium chloride SA  20 mEq Oral BID    predniSONE  2.5 mg Oral Daily    sodium chloride 0.9%  250 mL Intravenous Once    spironolactone  25 mg Oral Daily with lunch    torsemide  40 mg Oral Daily    vitamin D  1,000 Units Oral Daily    warfarin  10 mg Oral Daily     PRN Meds:polyethylene glycol, senna-docusate 8.6-50 mg, zolpidem    Review of patient's allergies indicates:   Allergen Reactions    Lisinopril Anaphylaxis    Hydralazine analogues      Chronic constipation, impotence, dizziness     Objective:     Vital Signs (Most Recent):  Temp: 97.7 °F (36.5 °C)  (07/04/20 0801)  Pulse: 71 (07/04/20 0801)  Resp: 16 (07/04/20 0801)  BP: (!) 80/0 (07/04/20 0801)  SpO2: 96 % (07/04/20 0801) Vital Signs (24h Range):  Temp:  [96.5 °F (35.8 °C)-98.8 °F (37.1 °C)] 97.7 °F (36.5 °C)  Pulse:  [66-95] 71  Resp:  [16-18] 16  SpO2:  [95 %-99 %] 96 %  BP: (78-90)/(0) 80/0     Patient Vitals for the past 72 hrs (Last 3 readings):   Weight   07/04/20 0751 122.3 kg (269 lb 10 oz)   07/03/20 0726 122.1 kg (269 lb 2.9 oz)   07/02/20 0500 123.4 kg (272 lb 0.8 oz)     Body mass index is 35.57 kg/m².      Intake/Output Summary (Last 24 hours) at 7/4/2020 0921  Last data filed at 7/3/2020 2100  Gross per 24 hour   Intake 820 ml   Output 1850 ml   Net -1030 ml       Hemodynamic Parameters:       Telemetry: VAD artifact    Physical Exam  Vitals signs and nursing note reviewed.   Constitutional:       Appearance: Normal appearance.   HENT:      Head: Normocephalic and atraumatic.      Mouth/Throat:      Mouth: Mucous membranes are moist.   Eyes:      Extraocular Movements: Extraocular movements intact.   Neck:      Musculoskeletal: Normal range of motion.   Cardiovascular:      Rate and Rhythm: Normal rate and regular rhythm.      Comments: Smooth VAD hum  Pulmonary:      Effort: Pulmonary effort is normal. No respiratory distress.      Breath sounds: Normal breath sounds. No wheezing or rales.   Abdominal:      General: Bowel sounds are normal. There is no distension.      Tenderness: There is no abdominal tenderness. There is no guarding.   Musculoskeletal:         General: No swelling.   Skin:     General: Skin is warm.      Comments: ICD site- mild tenderness and drainage, dressing in place.   Neurological:      General: No focal deficit present.      Mental Status: He is alert.   Psychiatric:         Mood and Affect: Mood normal.         Significant Labs:  CBC:  Recent Labs   Lab 07/02/20  0354 07/03/20  0707 07/04/20  0513   WBC 5.60 6.23 6.21   RBC 4.68 4.78 4.96   HGB 12.5* 12.7* 13.3*   HCT  41.7 42.6 44.0    231 244   MCV 89 89 89   MCH 26.7* 26.6* 26.8*   MCHC 30.0* 29.8* 30.2*     BNP:  Recent Labs   Lab 07/03/20  0709   BNP 97     CMP:  Recent Labs   Lab 07/02/20  0354 07/03/20  0707 07/03/20  0709 07/04/20  0513   GLU 81 93  --  78   CALCIUM 9.3 9.2  --  9.6   ALBUMIN 3.5  --  3.5  --    PROT 7.1  --  7.2  --     136  --  137   K 3.5 3.9  --  3.7   CO2 25 25  --  26    102  --  100   BUN 13 13  --  12   CREATININE 1.9* 1.7*  --  1.7*   ALKPHOS 86  --  94  --    ALT 17  --  19  --    AST 31  --  27  --    BILITOT 0.6  --  0.4  --       Coagulation:   Recent Labs   Lab 07/02/20  0354 07/03/20  0707 07/04/20  0513   INR 1.4* 1.5* 1.5*   APTT 29.6 27.2 31.1     LDH:  Recent Labs   Lab 07/02/20  0354 07/03/20  0707 07/04/20  0513   * 645* 642*     Microbiology:  Microbiology Results (last 7 days)     Procedure Component Value Units Date/Time    Blood culture [[id number]] Collected: 07/03/20 0711    Order Status: Completed Specimen: Blood Updated: 07/04/20 0812     Blood Culture, Routine No Growth to date      No Growth to date    Blood culture [[id number]] Collected: 07/03/20 0712    Order Status: Completed Specimen: Blood Updated: 07/04/20 0812     Blood Culture, Routine No Growth to date      No Growth to date    Blood culture [[id number]] Collected: 07/02/20 1110    Order Status: Completed Specimen: Blood Updated: 07/03/20 1412     Blood Culture, Routine No Growth to date      No Growth to date    Aerobic culture [[id number]] Collected: 07/01/20 1741    Order Status: Completed Specimen: Skin from Abdomen Updated: 07/03/20 1203     Aerobic Bacterial Culture No significant isolate    Aerobic culture [[id number]]  (Abnormal) Collected: 07/01/20 2531    Order Status: Completed Specimen: Wound from Abdomen Updated: 07/03/20 1016     Aerobic Bacterial Culture GRAM NEGATIVE LAURA  Moderate  Identification and susceptibility pending  No other significant isolate      Narrative:      Left  chest incision    Culture, Anaerobe [[id number]] Collected: 07/01/20 1741    Order Status: Completed Specimen: Incision site from Chest, Left Updated: 07/02/20 1109     Anaerobic Culture Culture in progress    Culture, Anaerobe [[id number]] Collected: 07/01/20 1741    Order Status: Completed Specimen: Skin from Abdomen Updated: 07/02/20 0733     Anaerobic Culture Culture in progress    Narrative:      Drive line site    Culture, Anaerobe [[id number]] Collected: 07/01/20 2153    Order Status: Sent Specimen: Wound from Abdomen Updated: 07/01/20 2212    Culture, Anaerobe [[id number]]     Order Status: Canceled Specimen: Skin from Abdomen     Aerobic culture [[id number]]     Order Status: Canceled Specimen: Skin from Abdomen     Aerobic culture [[id number]] Collected: 07/01/20 1741    Order Status: Canceled Specimen: Incision site from Chest, Left           BMP:   Recent Labs   Lab 07/04/20  0513   GLU 78      K 3.7      CO2 26   BUN 12   CREATININE 1.7*   CALCIUM 9.6   MG 2.0     Cardiac Markers: No results for input(s): CKMB, TROPONINT, MYOGLOBIN in the last 72 hours.  Coagulation:   Recent Labs   Lab 07/04/20  0513   INR 1.5*   APTT 31.1     I have reviewed all pertinent labs within the past 24 hours.    Estimated Creatinine Clearance: 68.9 mL/min (A) (based on SCr of 1.7 mg/dL (H)).    Diagnostic Results:  I have reviewed all pertinent imaging results/findings within the past 24 hours.

## 2020-07-04 NOTE — ASSESSMENT & PLAN NOTE
- presented with device pocket infection  - has been on oral doxycycline at home for the last 1 week with no improvement  - denies any fever but does admit to fatigue and chills   - labs pending   - ICD device check - 07/01/2020 --. Device interrogation revealed HV x 1 on 6/27/20 @ 3:41 pm for MMVT, Type 2 break.,CL-188 bpm.    Plan   - ID consulted who recommended IV vancomycin and IV ceftriaxone. C/w with recx. On 7/4 DC Vanco, c/w ceftriaxone. Pocket c/s positive for GNB  - Plan for EP to extract device, rec CTS consult  - will f/u blood and site cultures and based on the sensitivities will narrow the antibitoics  - EP informed who will plan an outpatient generator change and pocket site debridement.  - patient will stay over the weekend o get IV antibiotics and get the final results of his culture.

## 2020-07-04 NOTE — PLAN OF CARE
Plan of care discussed with patient.  Patient ambulating independently, fall precautions in place. Patient has no complaints of pain. Rocephin discontinued. Cefepime IVP q8h initiated. Coumadin 10 mg administered; INR 1.5. ICD dressing changed. LVAD dressing change due 7/5. Discussed medications and care. Patient has no questions at this time. Will continue to monitor.

## 2020-07-04 NOTE — PROGRESS NOTES
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    Recommendations:     1- F/u on wound cx.   2-Continue ceftriaxone. Daily dressing changes   3- Source control per EP plan for device extraction  4- Abx PO for 2 weeks at home following device removal  5- If blood clx become positive, consider BRITTANY.         Thank you for your consult. I will follow-up with patient. Please contact us if you have any additional questions.    Lory Linda MD  Infectious Disease  [facility]    Subjective:     Principal Problem:ICD (implantable cardioverter-defibrillator) infection    HPI: Tim Richards is a 54 y.o. male with past medical history of DCM, CHF stage D s/p HM2, VT with ICD and lead replacement on 3/9/2020 who presents with an ICD pocket infection. He reports surgical glue was used with the lead replacement, and the incision never fully closed. He had a previous infection since then in April and was treated with oral abx. Cultures negative at that time. He reports complete resolution of the infection after treatment at that time. However, over the past week he has noticed yellow non-odorous drainage from the site. He was prescribed oral doxycycline on 6/29/2020 and advised to get admitted for further treatment. He did not think it was necessary at that time and continued the doxycycline without improvement. He denies fever, aches, or chills. On 7/1/2020 evening, he was started on IV vancomycin and cefriaxone and site cultures obtained. Wound cx  7/1/2020 resulted positive for GNR. Vancomycin d/c 7/3/2020.  Interval History:     Review of Systems   Constitutional: Negative for chills, fatigue and fever.   HENT: Negative for mouth sores and sore throat.    Respiratory: Negative for shortness of breath and wheezing.    Cardiovascular: Negative for chest pain.   Gastrointestinal: Negative for abdominal distention, abdominal pain, diarrhea, nausea and vomiting.   Musculoskeletal: Negative for myalgias.   Skin: Positive for wound. Negative for color change.   Neurological: Negative for dizziness and weakness.     Objective:     Vital Signs (Most Recent):  Temp: 97.7 °F (36.5 °C) (07/04/20 0801)  Pulse: 81 (07/04/20 1052)  Resp: 16 (07/04/20 0801)  BP: (!) 80/0 (07/04/20 0801)  SpO2: 96 % (07/04/20 0801) Vital Signs (24h Range):  Temp:  [96.5 °F (35.8 °C)-98.8 °F (37.1 °C)] 97.7 °F (36.5 °C)  Pulse:  [66-95] 81  Resp:  [16-18] 16  SpO2:  [95 %-99 %] 96 %  BP: (78-90)/(0) 80/0     Weight: 122.3 kg (269 lb 10 oz)  Body mass index is 35.57 kg/m².    Estimated Creatinine Clearance: 68.9 mL/min (A) (based on SCr of 1.7 mg/dL (H)).    Physical Exam  Constitutional:       Appearance: He is obese.   HENT:      Head: Normocephalic and atraumatic.      Mouth/Throat:      Mouth: Mucous membranes are moist.      Pharynx: Oropharynx is clear.   Eyes:      General: No scleral icterus.     Extraocular Movements: Extraocular movements intact.   Neck:      Musculoskeletal: Neck supple.   Cardiovascular:      Comments: VAD hum  Pulmonary:      Effort: Pulmonary effort is normal.      Breath sounds: Normal breath sounds.   Abdominal:      General: Bowel sounds are normal.      Palpations: Abdomen is soft.      Tenderness: There is no abdominal tenderness.   Skin:     General: Skin is warm and dry.      Capillary Refill: Capillary refill takes less than 2 seconds.      Findings: Lesion present.      Comments: Opaque tan draining noted under ICD site dressing, surrounding area warm    Neurological:      General: No focal deficit present.      Mental Status: He is alert and oriented to person, place, and time. Mental status is at baseline.   Psychiatric:         Mood and Affect: Mood normal.         Behavior: Behavior normal.         Thought Content: Thought content normal.         Judgment: Judgment normal.         Significant Labs:   Blood Culture:   Recent Labs   Lab 03/11/20  0431 07/02/20  1110 07/03/20  0711 07/03/20  0712   LABBLOO No growth after 5 days.  No growth after 5 days. No Growth to date  No Growth to date No Growth to date  No Growth to date No Growth to date  No Growth to date     CBC:   Recent Labs   Lab 07/03/20  0707 07/04/20  0513   WBC 6.23 6.21   HGB 12.7* 13.3*   HCT 42.6 44.0    244     CMP:   Recent Labs   Lab 07/03/20  0707 07/03/20  0709 07/04/20  0513     --  137   K 3.9  --  3.7     --  100   CO2 25  --  26   GLU 93  --  78   BUN 13  --  12   CREATININE 1.7*  --  1.7*   CALCIUM 9.2  --  9.6   PROT  --  7.2  --    ALBUMIN  --  3.5  --    BILITOT  --  0.4  --    ALKPHOS  --  94  --    AST  --  27  --    ALT  --  19  --    ANIONGAP 9  --  11   EGFRNONAA 45.0*  --  45.0*     Wound Culture:   Recent Labs   Lab 07/01/20  1741 07/01/20  2153   LABAERO No significant isolate GRAM NEGATIVE LAURA  Moderate  Identification and susceptibility pending  No other significant isolate  *       Significant Imaging: I have reviewed all pertinent imaging results/findings within the past 24 hours.

## 2020-07-04 NOTE — PROGRESS NOTES
07/03/2020  Kole Mendoza    Current provider:  Guerda Bautista MD      I, Kole Mendoza, rounded on Tim Richards to ensure all mechanical assist device settings (IABP or VAD) were appropriate and all parameters were within limits.  I was able to ensure all back up equipment was present, the staff had no issues, and the Perfusion Department daily rounding was complete.    11:29 PM

## 2020-07-04 NOTE — PLAN OF CARE
Patient continues with antibiotic therapy for ICD infection; dressing to left chest wall is CDI. LVAD #'s and dopplers WDL. Kit dressing change is every other day; due Sunday 7/5. Patient has a DND from [id number]. Patient remains free from falls and injuries through out shift. Patient AAO and VSS. Plan of care reviewed with patient. Patient verbalizes understanding of plan. Will continue to monitor.

## 2020-07-04 NOTE — ASSESSMENT & PLAN NOTE
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    Recommendations:     1- F/u on wound cx.   2-Continue ceftriaxone. Daily dressing changes   3- Source control per EP plan for device extraction  4- Abx PO for 2 weeks at home following device removal  5- If blood clx become positive, consider BRITTANY.

## 2020-07-04 NOTE — NURSING
ICD incision on left chest wall cleaned with chlorhexidine and covered with gauze/tegaderm. Small amount of purulent drainage noted on old dressing.

## 2020-07-04 NOTE — ASSESSMENT & PLAN NOTE
LDH in 640 - will give 250 ml fluids Repeat TTE. LFT normal.  Will c/w coumadin. INR 1.5 today. Coumadin 10 mg. Given risk of GI bleed, patient not a candidate for heparin bridge. Also given concern risk of bleeding, hold off on starting persantine. C/W ASA 81 mg daily. Likely LDH elevation as an acute phase reactant from infection.   - INR 1.5 . Goal of 2-3   - given history of GI bleed in 2019 will just boost tonight with an extra dose of coumadin (10mg)   - patient denies any dark urine and no obvious scleral icterus on exam  - drive site clean and dry but will send site cultures.    Procedure: Device Interrogation Including analysis of device parameters  Current Settings: Ventricular Assist Device  Review of device function is stable/unstable stable    TXP LVAD INTERROGATIONS 7/4/2020 7/3/2020 7/3/2020 7/3/2020 7/3/2020 7/3/2020 7/2/2020   Type HeartMate II HeartMate II HeartMate II HeartMate II HeartMate II HeartMate II HeartMate II   Flow 4.8 4.7 5.3 4.6 4.6 4.9 5.0   Speed 9800 9800 9800 9800 9800 9800 9800   PI 4.0 2.5 2.4 2.8 3.9 3.9 3.1   Power ([place]) 6.0 6.0 6.1 6.2 5.9 6.1 6.1   LSL - 9400 - 9400 9400 9400 9400   Pulsatility No Pulse No Pulse No Pulse No Pulse No Pulse - -

## 2020-07-04 NOTE — ASSESSMENT & PLAN NOTE
BiV-Icd [employer] (2014 Dr. Chiu), successful DFT at 35J on 10/18/2019. Multiple VT episodes,  generator change (BiV ICD -> dual ICD),  device revision (addition of a new RV/ICD lead) and another VF induction via ICD on 3/2020. Presented from device clinic with device erosion.       - Blood cultured negative to date- awaiting final results  - Wound cultures growing GNB  - Antibiotic regimen per ID recommendations (currently on Rocephin, vancomycin discontinued)  - May need PICC placement and continued IV abx at home vs PO abx, depending on final wound cx, awaiting final ID recommendations  - As per ID if repeat cx positive, consider BRITTANY  - Continue current device setting and current antiarrythmic agents   - Plan to evaluate for device extraction in near future   - When antibiotics is concluded (per ID recommendations), will consider an alternative method of ICD implantation   - CT surgery consulted for evaluation and back up plan recommendations regarding device extraction

## 2020-07-04 NOTE — PROGRESS NOTES
[facility]  Heart Transplant  Progress Note    Patient Name: Tim Richards  MRN: [id number]  Admission Date: 7/1/2020  Hospital Length of Stay: 3 days  Attending Physician: Guerda Bautista MD  Primary Care Provider: Power Connors MD  Principal Problem:ICD (implantable cardioverter-defibrillator) infection    Subjective:     Interval History: Patient seen and examined today at bedside.  no new complaints. Wound c/s positive for GNB. Blood c/s negative. Slight drainage at ICD pocket site. Dressing in place, not much tender. ID f/u appreciated- DC Vancomycin, s/w ceftriaxone. As per ID if repeat positive, consider BRITTANY.  EP - plan for ICD removal, CTS consult for input and back up plan. EP follow up appreciated.  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   LDH in 640 - will give 250 ml fluids Repeat TTE. LFT normal.  Will c/w coumadin. INR 1.5 today. Coumadin 10 mg. Given risk of GI bleed, patient not a candidate for heparin bridge. Also given concern risk of bleeding, hold off on starting persantine. C/W ASA 81 mg daily. Likely LDH elevation as an acute phase reactant from infection.            Continuous Infusions:  Scheduled Meds:   allopurinoL  100 mg Oral Daily    amiodarone  200 mg Oral Daily    amLODIPine  10 mg Oral Daily    aspirin  81 mg Oral Daily    cefTRIAXone (ROCEPHIN) 2 g in dextrose 5 % 50 mL IVPB (ready to mix system)  2 g Intravenous Q24H    doxazosin  8 mg Oral Q12H    ferrous gluconate  324 mg Oral Daily with lunch    fluticasone propionate  2 spray Each Nostril Daily    guanFACINE  1 mg Oral QHS    pantoprazole  40 mg Oral Daily with lunch    paroxetine  10 mg Oral QAM    potassium chloride SA  20 mEq Oral BID    predniSONE  2.5 mg Oral Daily    sodium chloride 0.9%  250 mL Intravenous Once    spironolactone  25 mg Oral Daily with lunch    torsemide  40 mg Oral Daily    vitamin D  1,000  Units Oral Daily    warfarin  10 mg Oral Daily     PRN Meds:polyethylene glycol, senna-docusate 8.6-50 mg, zolpidem    Review of patient's allergies indicates:   Allergen Reactions    Lisinopril Anaphylaxis    Hydralazine analogues      Chronic constipation, impotence, dizziness     Objective:     Vital Signs (Most Recent):  Temp: 97.7 °F (36.5 °C) (07/04/20 0801)  Pulse: 71 (07/04/20 0801)  Resp: 16 (07/04/20 0801)  BP: (!) 80/0 (07/04/20 0801)  SpO2: 96 % (07/04/20 0801) Vital Signs (24h Range):  Temp:  [96.5 °F (35.8 °C)-98.8 °F (37.1 °C)] 97.7 °F (36.5 °C)  Pulse:  [66-95] 71  Resp:  [16-18] 16  SpO2:  [95 %-99 %] 96 %  BP: (78-90)/(0) 80/0     Patient Vitals for the past 72 hrs (Last 3 readings):   Weight   07/04/20 0751 122.3 kg (269 lb 10 oz)   07/03/20 0726 122.1 kg (269 lb 2.9 oz)   07/02/20 0500 123.4 kg (272 lb 0.8 oz)     Body mass index is 35.57 kg/m².      Intake/Output Summary (Last 24 hours) at 7/4/2020 0921  Last data filed at 7/3/2020 2100  Gross per 24 hour   Intake 820 ml   Output 1850 ml   Net -1030 ml       Hemodynamic Parameters:       Telemetry: VAD artifact    Physical Exam  Vitals signs and nursing note reviewed.   Constitutional:       Appearance: Normal appearance.   HENT:      Head: Normocephalic and atraumatic.      Mouth/Throat:      Mouth: Mucous membranes are moist.   Eyes:      Extraocular Movements: Extraocular movements intact.   Neck:      Musculoskeletal: Normal range of motion.   Cardiovascular:      Rate and Rhythm: Normal rate and regular rhythm.      Comments: Smooth VAD hum  Pulmonary:      Effort: Pulmonary effort is normal. No respiratory distress.      Breath sounds: Normal breath sounds. No wheezing or rales.   Abdominal:      General: Bowel sounds are normal. There is no distension.      Tenderness: There is no abdominal tenderness. There is no guarding.   Musculoskeletal:         General: No swelling.   Skin:     General: Skin is warm.      Comments: ICD site- mild  tenderness and drainage, dressing in place.   Neurological:      General: No focal deficit present.      Mental Status: He is alert.   Psychiatric:         Mood and Affect: Mood normal.         Significant Labs:  CBC:  Recent Labs   Lab 07/02/20 0354 07/03/20 0707 07/04/20 0513   WBC 5.60 6.23 6.21   RBC 4.68 4.78 4.96   HGB 12.5* 12.7* 13.3*   HCT 41.7 42.6 44.0    231 244   MCV 89 89 89   MCH 26.7* 26.6* 26.8*   MCHC 30.0* 29.8* 30.2*     BNP:  Recent Labs   Lab 07/03/20 0709   BNP 97     CMP:  Recent Labs   Lab 07/02/20 0354 07/03/20 0707 07/03/20 0709 07/04/20  0513   GLU 81 93  --  78   CALCIUM 9.3 9.2  --  9.6   ALBUMIN 3.5  --  3.5  --    PROT 7.1  --  7.2  --     136  --  137   K 3.5 3.9  --  3.7   CO2 25 25  --  26    102  --  100   BUN 13 13  --  12   CREATININE 1.9* 1.7*  --  1.7*   ALKPHOS 86  --  94  --    ALT 17  --  19  --    AST 31  --  27  --    BILITOT 0.6  --  0.4  --       Coagulation:   Recent Labs   Lab 07/02/20 0354 07/03/20 0707 07/04/20  0513   INR 1.4* 1.5* 1.5*   APTT 29.6 27.2 31.1     LDH:  Recent Labs   Lab 07/02/20 0354 07/03/20 0707 07/04/20  0513   * 645* 642*     Microbiology:  Microbiology Results (last 7 days)     Procedure Component Value Units Date/Time    Blood culture [[id number]] Collected: 07/03/20 0711    Order Status: Completed Specimen: Blood Updated: 07/04/20 0812     Blood Culture, Routine No Growth to date      No Growth to date    Blood culture [[id number]] Collected: 07/03/20 0712    Order Status: Completed Specimen: Blood Updated: 07/04/20 0812     Blood Culture, Routine No Growth to date      No Growth to date    Blood culture [[id number]] Collected: 07/02/20 1110    Order Status: Completed Specimen: Blood Updated: 07/03/20 1412     Blood Culture, Routine No Growth to date      No Growth to date    Aerobic culture [[id number]] Collected: 07/01/20 1741    Order Status: Completed Specimen: Skin from Abdomen Updated: 07/03/20 1203      Aerobic Bacterial Culture No significant isolate    Aerobic culture [[id number]]  (Abnormal) Collected: 07/01/20 2153    Order Status: Completed Specimen: Wound from Abdomen Updated: 07/03/20 1016     Aerobic Bacterial Culture GRAM NEGATIVE LAURA  Moderate  Identification and susceptibility pending  No other significant isolate      Narrative:      Left chest incision    Culture, Anaerobe [[id number]] Collected: 07/01/20 1741    Order Status: Completed Specimen: Incision site from Chest, Left Updated: 07/02/20 1109     Anaerobic Culture Culture in progress    Culture, Anaerobe [[id number]] Collected: 07/01/20 1741    Order Status: Completed Specimen: Skin from Abdomen Updated: 07/02/20 0733     Anaerobic Culture Culture in progress    Narrative:      Drive line site    Culture, Anaerobe [[id number]] Collected: 07/01/20 2153    Order Status: Sent Specimen: Wound from Abdomen Updated: 07/01/20 2212    Culture, Anaerobe [[id number]]     Order Status: Canceled Specimen: Skin from Abdomen     Aerobic culture [[id number]]     Order Status: Canceled Specimen: Skin from Abdomen     Aerobic culture [[id number]] Collected: 07/01/20 1741    Order Status: Canceled Specimen: Incision site from Chest, Left           BMP:   Recent Labs   Lab 07/04/20  0513   GLU 78      K 3.7      CO2 26   BUN 12   CREATININE 1.7*   CALCIUM 9.6   MG 2.0     Cardiac Markers: No results for input(s): CKMB, TROPONINT, MYOGLOBIN in the last 72 hours.  Coagulation:   Recent Labs   Lab 07/04/20  0513   INR 1.5*   APTT 31.1     I have reviewed all pertinent labs within the past 24 hours.    Estimated Creatinine Clearance: 68.9 mL/min (A) (based on SCr of 1.7 mg/dL (H)).    Diagnostic Results:  I have reviewed all pertinent imaging results/findings within the past 24 hours.    Assessment and Plan:     55 yo AAM with DCM, HBP, CHF stage D s/p HM 2, ICD lead replacement 03/09/2020, hyperthyroidism,  sent in from clinic with a ICD pocket site infection.  Patient wife reports yellow pus oozing from site this past week. Was prescribed antibiotics on Monday 6/29/20, and advised to come get admitted that day, but patient reported he didn't think it was necessary at that time. Denies fever, but reports intermittent nausea and chills, and Left side of Left hand tingling.      VAD parameters at baseline.  Pt speed decreased to 9800 rpms from 10,000 on 06/12/2020    ICD device check - 07/01/2020 --. Device interrogation revealed HV x 1 on 6/27/20 @ 3:41 pm for MMVT, Type 2 break.,CL-188 bpm. Patient reported sitting in car bending over when it occurred, not doing anything strenuous. Patient denies LOC. Patient reports overall feeling palpitations, tired, no energy, and COLEMAN, even prior to HV therapy.          * ICD (implantable cardioverter-defibrillator) infection  - presented with device pocket infection  - has been on oral doxycycline at home for the last 1 week with no improvement  - denies any fever but does admit to fatigue and chills   - labs pending   - ICD device check - 07/01/2020 --. Device interrogation revealed HV x 1 on 6/27/20 @ 3:41 pm for MMVT, Type 2 break.,CL-188 bpm.    Plan   - ID consulted who recommended IV vancomycin and IV ceftriaxone. C/w with recx. On 7/4 DC Vanco, c/w ceftriaxone. Pocket c/s positive for GNB  - Plan for EP to extract device, rec CTS consult  - will f/u blood and site cultures and based on the sensitivities will narrow the antibitoics  - EP informed who will plan an outpatient generator change and pocket site debridement.  - patient will stay over the weekend o get IV antibiotics and get the final results of his culture.       Elevated LDH  LDH in 640 - will give 250 ml fluids Repeat TTE. LFT normal.  Will c/w coumadin. INR 1.5 today. Coumadin 10 mg. Given risk of GI bleed, patient not a candidate for heparin bridge. Also given concern risk of bleeding, hold off on starting persantine. C/W ASA 81 mg daily. Likely LDH elevation as  an acute phase reactant from infection.         VT (ventricular tachycardia)  - device check showed a VT episode with shock on 06/27  - patient denies any LOC   - c/w amiodarone 200 mg daily.   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 LFT normal.  Will c/w coumadin. INR 1.5 today. Coumadin 10 mg. Given risk of GI bleed, patient not a candidate for heparin bridge. Also given concern risk of bleeding, hold off on starting persantine. C/W ASA 81 mg daily. Likely LDH elevation as an acute phase reactant from infection.   - INR 1.5 . Goal of 2-3   - given history of GI bleed in 2019 will just boost tonight with an extra dose of coumadin (10mg)   - patient denies any dark urine and no obvious scleral icterus on exam  - drive site clean and dry but will send site cultures.    Procedure: Device Interrogation Including analysis of device parameters  Current Settings: Ventricular Assist Device  Review of device function is stable/unstable stable    TXP LVAD INTERROGATIONS 7/4/2020 7/3/2020 7/3/2020 7/3/2020 7/3/2020 7/3/2020 7/2/2020   Type HeartMate II HeartMate II HeartMate II HeartMate II HeartMate II HeartMate II HeartMate II   Flow 4.8 4.7 5.3 4.6 4.6 4.9 5.0   Speed 9800 9800 9800 9800 9800 9800 9800   PI 4.0 2.5 2.4 2.8 3.9 3.9 3.1   Power (Jackson) 6.0 6.0 6.1 6.2 5.9 6.1 6.1   LSL - 9400 - 9400 9400 9400 9400   Pulsatility No Pulse No Pulse No Pulse No Pulse No Pulse - -       NICM (nonischemic cardiomyopathy)  - Will give bolus fluids. Reduce Torsemide to 20 mg daily.   - keep K > 4 and Mg >2      Seen and d/w Dr. Lily Hopkins MD  Heart Transplant  [facility]

## 2020-07-04 NOTE — PROGRESS NOTES
[facility]  Cardiac Electrophysiology  Progress Note    Admission Date: 7/1/2020  Code Status: Full Code   Attending Physician: Guerda Bautista MD   Expected Discharge Date: 7/6/2020  Principal Problem:ICD (implantable cardioverter-defibrillator) infection    Subjective:     Interval History:   - Patient seen and examined today at bedside.  - No active cardiac complaints at this time.   - Growing GNB in wound cultures, Blood cultures remain negative to date. Vanc was discontinued, now on Cefriazone only  - dressing to left chest wall is CDI, non tender around area. Kit dressing changed yesterday, plan to change Sunday again   - He is getting diuresing with torsemide, K 3.9 replaced. INR 1.5      ROS  Objective:     Vital Signs (Most Recent):  Temp: 97.7 °F (36.5 °C) (07/04/20 0801)  Pulse: 81 (07/04/20 1052)  Resp: 16 (07/04/20 0801)  BP: (!) 80/0 (07/04/20 0801)  SpO2: 96 % (07/04/20 0801) Vital Signs (24h Range):  Temp:  [96.5 °F (35.8 °C)-98.8 °F (37.1 °C)] 97.7 °F (36.5 °C)  Pulse:  [66-95] 81  Resp:  [16-18] 16  SpO2:  [95 %-99 %] 96 %  BP: (78-90)/(0) 80/0     Weight: 122.3 kg (269 lb 10 oz)  Body mass index is 35.57 kg/m².     SpO2: 96 %  O2 Device (Oxygen Therapy): room air    Physical Exam   Constitutional: He is oriented to person, place, and time. He appears well-developed and well-nourished. No distress.   HENT:   Head: Normocephalic.   Left Ear: External ear normal.   Eyes: Pupils are equal, round, and reactive to light. Right eye exhibits no discharge. Left eye exhibits no discharge.   Neck: Normal range of motion. No thyromegaly present.   Cardiovascular: Normal rate and regular rhythm. Exam reveals no friction rub.   Murmur heard.  Pulmonary/Chest: Effort normal and breath sounds normal. No respiratory distress.   Abdominal: Soft. Bowel sounds are normal. He exhibits distension. There is no abdominal tenderness.   Musculoskeletal: Normal range of motion.         General: No edema.    Neurological: He is alert and oriented to person, place, and time.   Skin: Rash noted. There is mild erythema. nontender. Dressing over wound, C/D/I      Significant Labs:   CMP:   Recent Labs   Lab 07/03/20 0707 07/03/20  0709 07/04/20  0513     --  137   K 3.9  --  3.7     --  100   CO2 25  --  26   GLU 93  --  78   BUN 13  --  12   CREATININE 1.7*  --  1.7*   CALCIUM 9.2  --  9.6   PROT  --  7.2  --    ALBUMIN  --  3.5  --    BILITOT  --  0.4  --    ALKPHOS  --  94  --    AST  --  27  --    ALT  --  19  --    ANIONGAP 9  --  11   ESTGFRAFRICA 52.1*  --  52.1*   EGFRNONAA 45.0*  --  45.0*   , CBC:   Recent Labs   Lab 07/03/20 0707 07/04/20  0513   WBC 6.23 6.21   HGB 12.7* 13.3*   HCT 42.6 44.0    244    and INR:   Recent Labs   Lab 07/03/20 0707 07/04/20  0513   INR 1.5* 1.5*     Assessment and Plan:     * ICD (implantable cardioverter-defibrillator) infection  BiV-Icd Medtronic (2014 Dr. Chiu), successful DFT at 35J on 10/18/2019. Multiple VT episodes,  generator change (BiV ICD -> dual ICD),  device revision (addition of a new RV/ICD lead) and another VF induction via ICD on 3/2020. Presented from device clinic with device erosion.       - Blood cultured negative to date- awaiting final results  - Wound cultures growing GNB  - Antibiotic regimen per ID recommendations (currently on Rocephin, vancomycin discontinued)  - May need PICC placement and continued IV abx at home vs PO abx, depending on final wound cx, awaiting final ID recommendations  - As per ID if repeat cx positive, consider BRITTANY  - Continue current device setting and current antiarrythmic agents   - Plan to evaluate for device extraction in near future   - When antibiotics is concluded (per ID recommendations), will consider an alternative method of ICD implantation   - CT surgery consulted for evaluation and back up plan recommendations regarding device extraction        Lana Garza MD  Cardiac  Electrophysiology  [facility]

## 2020-07-04 NOTE — PROGRESS NOTES
07/04/2020  Casper Harris    Current provider:  Guerda Bautista MD      I, Casper Harris, rounded on Tim Richards to ensure all mechanical assist device settings (IABP or VAD) were appropriate and all parameters were within limits.  I was able to ensure all back up equipment was present, the staff had no issues, and the Perfusion Department daily rounding was complete.    6:49 PM

## 2020-07-05 LAB
ANION GAP SERPL CALC-SCNC: 9 MMOL/L (ref 8–16)
APTT BLDCRRT: 35.4 SEC (ref 21–32)
ASCENDING AORTA: 3.34 CM
BASOPHILS # BLD AUTO: 0.02 K/UL (ref 0–0.2)
BASOPHILS NFR BLD: 0.3 % (ref 0–1.9)
BSA FOR ECHO PROCEDURE: 2.49 M2
BUN SERPL-MCNC: 13 MG/DL (ref 6–20)
CALCIUM SERPL-MCNC: 9.6 MG/DL (ref 8.7–10.5)
CHLORIDE SERPL-SCNC: 102 MMOL/L (ref 95–110)
CO2 SERPL-SCNC: 26 MMOL/L (ref 23–29)
CREAT SERPL-MCNC: 1.7 MG/DL (ref 0.5–1.4)
CV ECHO LV RWT: 0.28 CM
DIFFERENTIAL METHOD: ABNORMAL
DOP CALC LVOT AREA: 4.9 CM2
DOP CALC LVOT DIAMETER: 2.49 CM
E WAVE DECELERATION TIME: 112.55 MSEC
E/A RATIO: 1.59
E/E' RATIO: 8.67 M/S
ECHO LV POSTERIOR WALL: 1.34 CM (ref 0.6–1.1)
EOSINOPHIL # BLD AUTO: 0.1 K/UL (ref 0–0.5)
EOSINOPHIL NFR BLD: 2.1 % (ref 0–8)
ERYTHROCYTE [DISTWIDTH] IN BLOOD BY AUTOMATED COUNT: 15.6 % (ref 11.5–14.5)
EST. GFR  (AFRICAN AMERICAN): 52.1 ML/MIN/1.73 M^2
EST. GFR  (NON AFRICAN AMERICAN): 45 ML/MIN/1.73 M^2
FRACTIONAL SHORTENING: 1 % (ref 28–44)
GLUCOSE SERPL-MCNC: 68 MG/DL (ref 70–110)
HCT VFR BLD AUTO: 39.6 % (ref 40–54)
HGB BLD-MCNC: 12.6 G/DL (ref 14–18)
IMM GRANULOCYTES # BLD AUTO: 0.05 K/UL (ref 0–0.04)
IMM GRANULOCYTES NFR BLD AUTO: 0.7 % (ref 0–0.5)
INR PPP: 1.8 (ref 0.8–1.2)
INTERVENTRICULAR SEPTUM: 1.04 CM (ref 0.6–1.1)
LA MAJOR: 6.81 CM
LA MINOR: 6.81 CM
LA WIDTH: 4.84 CM
LDH SERPL L TO P-CCNC: 587 U/L (ref 110–260)
LEFT ATRIUM SIZE: 3.9 CM
LEFT ATRIUM VOLUME INDEX: 45 ML/M2
LEFT ATRIUM VOLUME: 109.26 CM3
LEFT INTERNAL DIMENSION IN SYSTOLE: 9.37 CM (ref 2.1–4)
LEFT VENTRICLE DIASTOLIC VOLUME INDEX: 225.22 ML/M2
LEFT VENTRICLE DIASTOLIC VOLUME: 547.08 ML
LEFT VENTRICLE MASS INDEX: 281 G/M2
LEFT VENTRICLE SYSTOLIC VOLUME INDEX: 201.6 ML/M2
LEFT VENTRICLE SYSTOLIC VOLUME: 489.59 ML
LEFT VENTRICULAR INTERNAL DIMENSION IN DIASTOLE: 9.5 CM (ref 3.5–6)
LEFT VENTRICULAR MASS: 682.26 G
LV LATERAL E/E' RATIO: 7.8 M/S
LV SEPTAL E/E' RATIO: 9.75 M/S
LYMPHOCYTES # BLD AUTO: 1.2 K/UL (ref 1–4.8)
LYMPHOCYTES NFR BLD: 17.3 % (ref 18–48)
MAGNESIUM SERPL-MCNC: 2 MG/DL (ref 1.6–2.6)
MCH RBC QN AUTO: 27.5 PG (ref 27–31)
MCHC RBC AUTO-ENTMCNC: 31.8 G/DL (ref 32–36)
MCV RBC AUTO: 86 FL (ref 82–98)
MONOCYTES # BLD AUTO: 0.8 K/UL (ref 0.3–1)
MONOCYTES NFR BLD: 12 % (ref 4–15)
MV PEAK A VEL: 0.49 M/S
MV PEAK E VEL: 0.78 M/S
MV STENOSIS PRESSURE HALF TIME: 32.64 MS
MV VALVE AREA P 1/2 METHOD: 6.74 CM2
NEUTROPHILS # BLD AUTO: 4.6 K/UL (ref 1.8–7.7)
NEUTROPHILS NFR BLD: 67.6 % (ref 38–73)
NRBC BLD-RTO: 0 /100 WBC
PHOSPHATE SERPL-MCNC: 3.2 MG/DL (ref 2.7–4.5)
PISA TR MAX VEL: 2.09 M/S
PLATELET # BLD AUTO: 234 K/UL (ref 150–350)
PMV BLD AUTO: 10.8 FL (ref 9.2–12.9)
POTASSIUM SERPL-SCNC: 4 MMOL/L (ref 3.5–5.1)
PROTHROMBIN TIME: 17.1 SEC (ref 9–12.5)
RA MAJOR: 5.76 CM
RA WIDTH: 4.15 CM
RBC # BLD AUTO: 4.59 M/UL (ref 4.6–6.2)
RV TISSUE DOPPLER FREE WALL SYSTOLIC VELOCITY 1 (APICAL 4 CHAMBER VIEW): 10.73 CM/S
SINUS: 3.16 CM
SODIUM SERPL-SCNC: 137 MMOL/L (ref 136–145)
STJ: 2.96 CM
TDI LATERAL: 0.1 M/S
TDI SEPTAL: 0.08 M/S
TDI: 0.09 M/S
TR MAX PG: 17 MMHG
URATE SERPL-MCNC: 8.4 MG/DL (ref 3.4–7)
WBC # BLD AUTO: 6.81 K/UL (ref 3.9–12.7)

## 2020-07-05 PROCEDURE — 93750 INTERROGATION VAD IN PERSON: CPT | Mod: ,,, | Performed by: INTERNAL MEDICINE

## 2020-07-05 PROCEDURE — 93750 PR INTERROGATE VENT ASSIST DEV, IN PERSON, W PHYSICIAN ANALYSIS: ICD-10-PCS | Mod: ,,, | Performed by: INTERNAL MEDICINE

## 2020-07-05 PROCEDURE — 84550 ASSAY OF BLOOD/URIC ACID: CPT

## 2020-07-05 PROCEDURE — 83735 ASSAY OF MAGNESIUM: CPT

## 2020-07-05 PROCEDURE — 27000248 HC VAD-ADDITIONAL DAY

## 2020-07-05 PROCEDURE — 84100 ASSAY OF PHOSPHORUS: CPT

## 2020-07-05 PROCEDURE — 85025 COMPLETE CBC W/AUTO DIFF WBC: CPT

## 2020-07-05 PROCEDURE — 94761 N-INVAS EAR/PLS OXIMETRY MLT: CPT

## 2020-07-05 PROCEDURE — 63600175 PHARM REV CODE 636 W HCPCS: Performed by: INTERNAL MEDICINE

## 2020-07-05 PROCEDURE — 80048 BASIC METABOLIC PNL TOTAL CA: CPT

## 2020-07-05 PROCEDURE — 25000003 PHARM REV CODE 250

## 2020-07-05 PROCEDURE — 99233 PR SUBSEQUENT HOSPITAL CARE,LEVL III: ICD-10-PCS | Mod: ,,, | Performed by: INTERNAL MEDICINE

## 2020-07-05 PROCEDURE — 83615 LACTATE (LD) (LDH) ENZYME: CPT

## 2020-07-05 PROCEDURE — 25000003 PHARM REV CODE 250: Performed by: STUDENT IN AN ORGANIZED HEALTH CARE EDUCATION/TRAINING PROGRAM

## 2020-07-05 PROCEDURE — 27000685 HC LVAD KIT (30 DAY SUPPLY)

## 2020-07-05 PROCEDURE — 63600175 PHARM REV CODE 636 W HCPCS: Performed by: STUDENT IN AN ORGANIZED HEALTH CARE EDUCATION/TRAINING PROGRAM

## 2020-07-05 PROCEDURE — 99233 SBSQ HOSP IP/OBS HIGH 50: CPT | Mod: ,,, | Performed by: INTERNAL MEDICINE

## 2020-07-05 PROCEDURE — 85730 THROMBOPLASTIN TIME PARTIAL: CPT

## 2020-07-05 PROCEDURE — 36415 COLL VENOUS BLD VENIPUNCTURE: CPT

## 2020-07-05 PROCEDURE — 25000003 PHARM REV CODE 250: Performed by: INTERNAL MEDICINE

## 2020-07-05 PROCEDURE — 20600001 HC STEP DOWN PRIVATE ROOM

## 2020-07-05 PROCEDURE — 85610 PROTHROMBIN TIME: CPT

## 2020-07-05 RX ORDER — WARFARIN 7.5 MG/1
7.5 TABLET ORAL DAILY
Status: DISCONTINUED | OUTPATIENT
Start: 2020-07-05 | End: 2020-07-06

## 2020-07-05 RX ORDER — COLCHICINE 0.6 MG/1
0.6 TABLET, FILM COATED ORAL DAILY
Status: DISCONTINUED | OUTPATIENT
Start: 2020-07-05 | End: 2020-07-14 | Stop reason: HOSPADM

## 2020-07-05 RX ADMIN — AMLODIPINE BESYLATE 10 MG: 10 TABLET ORAL at 08:07

## 2020-07-05 RX ADMIN — ZOLPIDEM TARTRATE 10 MG: 5 TABLET ORAL at 08:07

## 2020-07-05 RX ADMIN — ALLOPURINOL 100 MG: 100 TABLET ORAL at 08:07

## 2020-07-05 RX ADMIN — MELATONIN 1000 UNITS: at 08:07

## 2020-07-05 RX ADMIN — PANTOPRAZOLE SODIUM 40 MG: 40 TABLET, DELAYED RELEASE ORAL at 11:07

## 2020-07-05 RX ADMIN — AMIODARONE HYDROCHLORIDE 200 MG: 200 TABLET ORAL at 08:07

## 2020-07-05 RX ADMIN — POTASSIUM CHLORIDE 20 MEQ: 1500 TABLET, EXTENDED RELEASE ORAL at 08:07

## 2020-07-05 RX ADMIN — PREDNISONE 2.5 MG: 2.5 TABLET ORAL at 08:07

## 2020-07-05 RX ADMIN — CEFEPIME 1 G: 1 INJECTION, POWDER, FOR SOLUTION INTRAMUSCULAR; INTRAVENOUS at 10:07

## 2020-07-05 RX ADMIN — SPIRONOLACTONE 25 MG: 25 TABLET ORAL at 11:07

## 2020-07-05 RX ADMIN — CEFEPIME 1 G: 1 INJECTION, POWDER, FOR SOLUTION INTRAMUSCULAR; INTRAVENOUS at 06:07

## 2020-07-05 RX ADMIN — DOXAZOSIN 8 MG: 8 TABLET ORAL at 08:07

## 2020-07-05 RX ADMIN — PAROXETINE HYDROCHLORIDE 10 MG: 10 TABLET, FILM COATED ORAL at 06:07

## 2020-07-05 RX ADMIN — CEFEPIME 1 G: 1 INJECTION, POWDER, FOR SOLUTION INTRAMUSCULAR; INTRAVENOUS at 02:07

## 2020-07-05 RX ADMIN — ACETAMINOPHEN 650 MG: 325 TABLET ORAL at 10:07

## 2020-07-05 RX ADMIN — FERROUS GLUCONATE TAB 324 MG (37.5 MG ELEMENTAL IRON) 324 MG: 324 (37.5 FE) TAB at 11:07

## 2020-07-05 RX ADMIN — COLCHICINE 0.6 MG: 0.6 TABLET, FILM COATED ORAL at 11:07

## 2020-07-05 RX ADMIN — ASPIRIN 81 MG: 81 TABLET, COATED ORAL at 08:07

## 2020-07-05 RX ADMIN — WARFARIN SODIUM 7.5 MG: 5 TABLET ORAL at 05:07

## 2020-07-05 RX ADMIN — GUANFACINE 1 MG: 1 TABLET ORAL at 08:07

## 2020-07-05 NOTE — SUBJECTIVE & OBJECTIVE
Interval History: Doing okay. Changed abx yesterday - no issues.    Review of Systems   Constitutional: Negative for chills and fever.   All other systems reviewed and are negative.    Objective:     Vital Signs (Most Recent):  Temp: 97.7 °F (36.5 °C) (07/05/20 0800)  Pulse: 80 (07/05/20 0800)  Resp: 16 (07/05/20 0800)  BP: (!) 80/0 (07/05/20 0800)  SpO2: 96 % (07/05/20 0800) Vital Signs (24h Range):  Temp:  [97.7 °F (36.5 °C)-98.8 °F (37.1 °C)] 97.7 °F (36.5 °C)  Pulse:  [64-85] 80  Resp:  [16-20] 16  SpO2:  [92 %-98 %] 96 %  BP: (76-86)/(0) 80/0     Weight: 121.1 kg (266 lb 15.6 oz)  Body mass index is 35.22 kg/m².    Estimated Creatinine Clearance: 68.5 mL/min (A) (based on SCr of 1.7 mg/dL (H)).    Physical Exam  Vitals signs and nursing note reviewed.   Constitutional:       Appearance: Normal appearance.   HENT:      Head: Normocephalic and atraumatic.   Musculoskeletal:         General: No swelling.   Neurological:      General: No focal deficit present.      Mental Status: He is alert and oriented to person, place, and time. Mental status is at baseline.   Psychiatric:         Mood and Affect: Mood normal.         Behavior: Behavior normal.         Thought Content: Thought content normal.         Judgment: Judgment normal.         Significant Labs:   Blood Culture:   Recent Labs   Lab 03/11/20  0431 07/02/20  1110 07/03/20  0711 07/03/20  0712   LABBLOO No growth after 5 days.  No growth after 5 days. No Growth to date  No Growth to date  No Growth to date No Growth to date  No Growth to date  No Growth to date No Growth to date  No Growth to date  No Growth to date     CBC:   Recent Labs   Lab 07/04/20  0513 07/05/20  0416   WBC 6.21 6.81   HGB 13.3* 12.6*   HCT 44.0 39.6*    234     Wound Culture:   Recent Labs   Lab 07/01/20  1741 07/01/20  2153   LABAERO No significant isolate ENTEROBACTER CLOACAE  Moderate  No other significant isolate  *       Significant Imaging: I have reviewed all  pertinent imaging results/findings within the past 24 hours.

## 2020-07-05 NOTE — ASSESSMENT & PLAN NOTE
BiV-Icd Medtronic (2014 Dr. Chiu), successful DFT at 35J on 10/18/2019. Multiple VT episodes,  generator change (BiV ICD -> dual ICD),  device revision (addition of a new RV/ICD lead) and another VF induction via ICD on 3/2020. Presented from device clinic with device erosion.       - Blood cultured negative to date  - Wound cultures growing Enterobacter.  - Antibiotic regimen per ID recommendations. Switched to Cefepime q8h. ID recommending 2 weeks of PO abx at home following device extraction  - Continue current device setting and current antiarrythmic agents   - Plan to evaluate for device extraction in near future   - When antibiotics are concluded (per ID recommendations), will consider an alternative method of ICD implantation   - CT surgery consulted for evaluation and back up plan recommendations regarding device extraction

## 2020-07-05 NOTE — SUBJECTIVE & OBJECTIVE
Interval History:   This morning patient frustrated with circumstances. Wants more frequent wound checks/cleaning.   He denies any active cardiac complaints.   Abx changed to cefepime q 8 hrs per ID for ICD infection growing Enterobacter.  Blood cultures remain negative to date.   Dressing is CDI. Patient LVAD HMII #'s WDL        ROS  Objective:     Vital Signs (Most Recent):  Temp: 97.9 °F (36.6 °C) (07/05/20 0500)  Pulse: 71 (07/05/20 0500)  Resp: 18 (07/05/20 0500)  BP: (!) 86/0 (07/05/20 0500)  SpO2: 96 % (07/05/20 0500) Vital Signs (24h Range):  Temp:  [97.9 °F (36.6 °C)-98.8 °F (37.1 °C)] 97.9 °F (36.6 °C)  Pulse:  [64-85] 71  Resp:  [18-20] 18  SpO2:  [92 %-98 %] 96 %  BP: (76-86)/(0) 86/0     Weight: 122.3 kg (269 lb 10 oz)  Body mass index is 35.57 kg/m².     SpO2: 96 %  O2 Device (Oxygen Therapy): room air    Physical Exam   Constitutional: He is oriented to person, place, and time. He appears well-developed and well-nourished. No distress.   HENT:   Head: Normocephalic.   Left Ear: External ear normal.   Eyes: Pupils are equal, round, and reactive to light. Right eye exhibits no discharge. Left eye exhibits no discharge.   Neck: Normal range of motion. No thyromegaly present.   Cardiovascular: Normal rate and regular rhythm. Exam reveals no friction rub.   Murmur heard.  Smooth VAD hum   Pulmonary/Chest: Effort normal and breath sounds normal. No respiratory distress.   Abdominal: Soft. Bowel sounds are normal. He exhibits no distension. There is no abdominal tenderness.   Musculoskeletal: Normal range of motion.         General: No edema.   Neurological: He is alert and oriented to person, place, and time.   Skin: Skin is warm and dry. No rash noted. No erythema.            Significant Labs:   Blood Culture: No results for input(s): LABBLOO in the last 48 hours., CMP:   Recent Labs   Lab 07/04/20  0513 07/05/20  0415    137   K 3.7 4.0    102   CO2 26 26   GLU 78 68*   BUN 12 13   CREATININE  1.7* 1.7*   CALCIUM 9.6 9.6   ANIONGAP 11 9   ESTGFRAFRICA 52.1* 52.1*   EGFRNONAA 45.0* 45.0*   , CBC:   Recent Labs   Lab 07/04/20  0513 07/05/20 0416   WBC 6.21 6.81   HGB 13.3* 12.6*   HCT 44.0 39.6*    234    and INR:   Recent Labs   Lab 07/04/20 0513 07/05/20 0415   INR 1.5* 1.8*

## 2020-07-05 NOTE — PROGRESS NOTES
[facility]  Cardiac Electrophysiology  Progress Note    Admission Date: 7/1/2020  Code Status: Full Code   Attending Physician: Guerda Bautista MD   Expected Discharge Date: 7/6/2020  Principal Problem:ICD (implantable cardioverter-defibrillator) infection    Subjective:     Interval History:   This morning patient frustrated with circumstances. Wants more frequent wound checks/cleaning.   He denies any active cardiac complaints.   Abx changed to cefepime q 8 hrs per ID for ICD infection growing Enterobacter.  Blood cultures remain negative to date.   Dressing is CDI. Patient LVAD HMII #'s WDL        ROS  Objective:     Vital Signs (Most Recent):  Temp: 97.9 °F (36.6 °C) (07/05/20 0500)  Pulse: 71 (07/05/20 0500)  Resp: 18 (07/05/20 0500)  BP: (!) 86/0 (07/05/20 0500)  SpO2: 96 % (07/05/20 0500) Vital Signs (24h Range):  Temp:  [97.9 °F (36.6 °C)-98.8 °F (37.1 °C)] 97.9 °F (36.6 °C)  Pulse:  [64-85] 71  Resp:  [18-20] 18  SpO2:  [92 %-98 %] 96 %  BP: (76-86)/(0) 86/0     Weight: 122.3 kg (269 lb 10 oz)  Body mass index is 35.57 kg/m².     SpO2: 96 %  O2 Device (Oxygen Therapy): room air    Physical Exam   Constitutional: He is oriented to person, place, and time. He appears well-developed and well-nourished. No distress.   HENT:   Head: Normocephalic.   Left Ear: External ear normal.   Eyes: Pupils are equal, round, and reactive to light. Right eye exhibits no discharge. Left eye exhibits no discharge.   Neck: Normal range of motion. No thyromegaly present.   Cardiovascular: Normal rate and regular rhythm. Exam reveals no friction rub.   Murmur heard.  Smooth VAD hum   Pulmonary/Chest: Effort normal and breath sounds normal. No respiratory distress.   Abdominal: Soft. Bowel sounds are normal. He exhibits no distension. There is no abdominal tenderness.   Musculoskeletal: Normal range of motion.         General: No edema.   Neurological: He is alert and oriented to person, place, and time.   Skin:  Skin is warm and dry. No rash noted. No erythema.            Significant Labs:   Blood Culture: No results for input(s): LABBLOO in the last 48 hours., CMP:   Recent Labs   Lab 07/04/20 0513 07/05/20 0415    137   K 3.7 4.0    102   CO2 26 26   GLU 78 68*   BUN 12 13   CREATININE 1.7* 1.7*   CALCIUM 9.6 9.6   ANIONGAP 11 9   ESTGFRAFRICA 52.1* 52.1*   EGFRNONAA 45.0* 45.0*   , CBC:   Recent Labs   Lab 07/04/20 0513 07/05/20 0416   WBC 6.21 6.81   HGB 13.3* 12.6*   HCT 44.0 39.6*    234    and INR:   Recent Labs   Lab 07/04/20 0513 07/05/20 0415   INR 1.5* 1.8*         Assessment and Plan:     * ICD (implantable cardioverter-defibrillator) infection  BiV-Icd [employer] (2014 Dr. Chiu), successful DFT at 35J on 10/18/2019. Multiple VT episodes,  generator change (BiV ICD -> dual ICD),  device revision (addition of a new RV/ICD lead) and another VF induction via ICD on 3/2020. Presented from device clinic with device erosion.       - Blood cultured negative to date  - Wound cultures growing Enterobacter.  - Antibiotic regimen per ID recommendations. Switched to Cefepime q8h. ID recommending 2 weeks of PO abx at home following device extraction  - Continue current device setting and current antiarrythmic agents   - Plan to evaluate for device extraction in near future   - When antibiotics are concluded (per ID recommendations), will consider an alternative method of ICD implantation   - CT surgery consulted for evaluation and back up plan recommendations regarding device extraction        Lana Garza MD  Cardiac Electrophysiology  [facility]

## 2020-07-05 NOTE — ASSESSMENT & PLAN NOTE
LDH in 640 -> 585 after  250 ml fluids, reduced torsemide. BNP 97. Repeat TTE. LFT normal.  Will c/w coumadin. INR 1.5 -> 1.8 today. Coumadin 7.5 mg. Given risk of GI bleed, patient not a candidate for heparin bridge. Also given concern risk of bleeding, hold off on starting persantine. C/W ASA 81 mg daily. Likely LDH elevation as an acute phase reactant from infection.

## 2020-07-05 NOTE — PLAN OF CARE
Plan of care discussed with patient. Patient ambulating independently, fall precautions in place. Cefepime IVP q8h continued. Coumadin 7.5 mg scheduled; INR 1.8. ICD dressing changes x2/shift today and yesterday. LVAD dressing change qod. Echo performed at bedside; estimated EF 10%. Discussed medications and care. Patient has no questions at this time. Will continue to monitor.

## 2020-07-05 NOTE — SUBJECTIVE & OBJECTIVE
Interval History: Patient seen and examined today at bedside. Upset about ongoing infection, Wound c/s positive for Enterobacter cloacae. Blood c/s negative. Slight drainage at ICD pocket site.  ID f/u appreciated- DC Vancomycin, changed to IV cefepime. As per ID if repeat positive, consider BRITTANY.  EP - plan for ICD removal, CTS consult for input and back up plan. EP follow up appreciated.  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   LDH in 640 -> 585 - Received 250 ml fluids on 7/4. Repeat TTE. LFT normal.  Will c/w coumadin. INR 1.5 -> 1.8 today. Coumadin 7.5 mg. Given risk of GI bleed, patient not a candidate for heparin bridge. Also given concern risk of bleeding, hold off on starting persantine. C/W ASA 81 mg daily. Likely LDH elevation due to acute phase reaction from infection.      Continuous Infusions:  Scheduled Meds:   allopurinoL  100 mg Oral Daily    amiodarone  200 mg Oral Daily    amLODIPine  10 mg Oral Daily    aspirin  81 mg Oral Daily    ceFEPime (MAXIPIME) IVPB  1 g Intravenous Q8H    colchicine  0.6 mg Oral Daily    doxazosin  8 mg Oral Q12H    ferrous gluconate  324 mg Oral Daily with lunch    fluticasone propionate  2 spray Each Nostril Daily    guanFACINE  1 mg Oral QHS    magnesium citrate  296 mL Oral Once    pantoprazole  40 mg Oral Daily with lunch    paroxetine  10 mg Oral QAM    potassium chloride SA  20 mEq Oral BID    predniSONE  2.5 mg Oral Daily    spironolactone  25 mg Oral Daily with lunch    [START ON 7/6/2020] torsemide  20 mg Oral Daily    vitamin D  1,000 Units Oral Daily    warfarin  7.5 mg Oral Daily     PRN Meds:acetaminophen, polyethylene glycol, senna-docusate 8.6-50 mg, zolpidem    Review of patient's allergies indicates:   Allergen Reactions    Lisinopril Anaphylaxis    Hydralazine analogues      Chronic constipation, impotence, dizziness     Objective:     Vital Signs (Most  Recent):  Temp: 97.7 °F (36.5 °C) (07/05/20 0800)  Pulse: 80 (07/05/20 0800)  Resp: 16 (07/05/20 0800)  BP: (!) 80/0 (07/05/20 0800)  SpO2: 96 % (07/05/20 0800) Vital Signs (24h Range):  Temp:  [97.7 °F (36.5 °C)-98.8 °F (37.1 °C)] 97.7 °F (36.5 °C)  Pulse:  [64-85] 80  Resp:  [16-20] 16  SpO2:  [92 %-98 %] 96 %  BP: (76-86)/(0) 80/0     Patient Vitals for the past 72 hrs (Last 3 readings):   Weight   07/05/20 0800 121.1 kg (266 lb 15.6 oz)   07/04/20 0751 122.3 kg (269 lb 10 oz)   07/03/20 0726 122.1 kg (269 lb 2.9 oz)     Body mass index is 35.22 kg/m².      Intake/Output Summary (Last 24 hours) at 7/5/2020 1016  Last data filed at 7/4/2020 2300  Gross per 24 hour   Intake 1105 ml   Output 1250 ml   Net -145 ml       Hemodynamic Parameters:       Telemetry: VAD artifact    Physical Exam  Vitals signs and nursing note reviewed.   Constitutional:       Appearance: Normal appearance.   HENT:      Head: Normocephalic and atraumatic.      Nose: No congestion.      Mouth/Throat:      Mouth: Mucous membranes are moist.   Eyes:      Extraocular Movements: Extraocular movements intact.      Pupils: Pupils are equal, round, and reactive to light.   Neck:      Musculoskeletal: Normal range of motion.   Cardiovascular:      Rate and Rhythm: Normal rate and regular rhythm.      Comments: Smooth VAD hum  Pulmonary:      Effort: Pulmonary effort is normal. No respiratory distress.      Breath sounds: Normal breath sounds. No rales.   Abdominal:      General: Abdomen is flat. Bowel sounds are normal. There is no distension.      Tenderness: There is no abdominal tenderness. There is no guarding.   Musculoskeletal:         General: No swelling.   Skin:     General: Skin is warm.      Comments: ICD site- slightly tender, swelling noted.    Neurological:      General: No focal deficit present.      Mental Status: He is alert.   Psychiatric:         Mood and Affect: Mood normal.         Significant Labs:  CBC:  Recent Labs   Lab  07/03/20  0707 07/04/20 0513 07/05/20  0416   WBC 6.23 6.21 6.81   RBC 4.78 4.96 4.59*   HGB 12.7* 13.3* 12.6*   HCT 42.6 44.0 39.6*    244 234   MCV 89 89 86   MCH 26.6* 26.8* 27.5   MCHC 29.8* 30.2* 31.8*     BNP:  Recent Labs   Lab 07/03/20  0709   BNP 97     CMP:  Recent Labs   Lab 07/02/20  0354 07/03/20 0707 07/03/20  0709 07/04/20 0513 07/05/20  0415   GLU 81 93  --  78 68*   CALCIUM 9.3 9.2  --  9.6 9.6   ALBUMIN 3.5  --  3.5  --   --    PROT 7.1  --  7.2  --   --     136  --  137 137   K 3.5 3.9  --  3.7 4.0   CO2 25 25  --  26 26    102  --  100 102   BUN 13 13  --  12 13   CREATININE 1.9* 1.7*  --  1.7* 1.7*   ALKPHOS 86  --  94  --   --    ALT 17  --  19  --   --    AST 31  --  27  --   --    BILITOT 0.6  --  0.4  --   --       Coagulation:   Recent Labs   Lab 07/03/20  0707 07/04/20 0513 07/05/20  0415   INR 1.5* 1.5* 1.8*   APTT 27.2 31.1 35.4*     LDH:  Recent Labs   Lab 07/03/20  0707 07/04/20 0513 07/05/20  0415   * 642* 587*     Microbiology:  Microbiology Results (last 7 days)     Procedure Component Value Units Date/Time    Blood culture [[id number]] Collected: 07/03/20 0711    Order Status: Completed Specimen: Blood Updated: 07/05/20 0812     Blood Culture, Routine No Growth to date      No Growth to date      No Growth to date    Blood culture [[id number]] Collected: 07/03/20 0712    Order Status: Completed Specimen: Blood Updated: 07/05/20 0812     Blood Culture, Routine No Growth to date      No Growth to date      No Growth to date    Blood culture [[id number]] Collected: 07/02/20 1110    Order Status: Completed Specimen: Blood Updated: 07/04/20 1412     Blood Culture, Routine No Growth to date      No Growth to date      No Growth to date    Aerobic culture [[id number]]  (Abnormal)  (Susceptibility) Collected: 07/01/20 2153    Order Status: Completed Specimen: Wound from Abdomen Updated: 07/04/20 1214     Aerobic Bacterial Culture ENTEROBACTER  CLOACAE  Moderate  No other significant isolate      Narrative:      Left chest incision    Culture, Anaerobe [[id number]] Collected: 07/01/20 2153    Order Status: Completed Specimen: Wound from Abdomen Updated: 07/04/20 1000     Anaerobic Culture Culture in progress    Narrative:      Left chest incision.    Aerobic culture [[id number]] Collected: 07/01/20 1741    Order Status: Completed Specimen: Skin from Abdomen Updated: 07/03/20 1203     Aerobic Bacterial Culture No significant isolate    Culture, Anaerobe [[id number]] Collected: 07/01/20 1741    Order Status: Completed Specimen: Incision site from Chest, Left Updated: 07/02/20 1109     Anaerobic Culture Culture in progress    Culture, Anaerobe [[id number]] Collected: 07/01/20 1741    Order Status: Completed Specimen: Skin from Abdomen Updated: 07/02/20 0733     Anaerobic Culture Culture in progress    Narrative:      Drive line site    Culture, Anaerobe [[id number]]     Order Status: Canceled Specimen: Skin from Abdomen     Aerobic culture [[id number]]     Order Status: Canceled Specimen: Skin from Abdomen     Aerobic culture [[id number]] Collected: 07/01/20 1741    Order Status: Canceled Specimen: Incision site from Chest, Left           BMP:   Recent Labs   Lab 07/05/20  0415   GLU 68*      K 4.0      CO2 26   BUN 13   CREATININE 1.7*   CALCIUM 9.6   MG 2.0     Cardiac Markers: No results for input(s): CKMB, TROPONINT, MYOGLOBIN in the last 72 hours.  Coagulation:   Recent Labs   Lab 07/05/20  0415   INR 1.8*   APTT 35.4*     I have reviewed all pertinent labs within the past 24 hours.    Estimated Creatinine Clearance: 68.5 mL/min (A) (based on SCr of 1.7 mg/dL (H)).    Diagnostic Results:  I have reviewed all pertinent imaging results/findings within the past 24 hours.

## 2020-07-05 NOTE — NURSING
ICD incision on left chest wall cleaned with chlorhexidine and covered with gauze/tegaderm. Small amount of dried tan drainage noted on old dressing

## 2020-07-05 NOTE — ASSESSMENT & PLAN NOTE
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    Recommendations:     Would treat with cefepime x 2 weeks after device extraction. Will sign off. Thanks, CB

## 2020-07-05 NOTE — PROGRESS NOTES
07/05/2020  Casper Harris    Current provider:  Guerda Bautista MD      I, Casper Harris, rounded on Tim Richards to ensure all mechanical assist device settings (IABP or VAD) were appropriate and all parameters were within limits.  I was able to ensure all back up equipment was present, the staff had no issues, and the Perfusion Department daily rounding was complete.    5:07 PM

## 2020-07-05 NOTE — PROGRESS NOTES
[facility]  Heart Transplant  Progress Note    Patient Name: Tim Richards  MRN: [id number]  Admission Date: 7/1/2020  Hospital Length of Stay: 4 days  Attending Physician: Guerda Bautista MD  Primary Care Provider: Power Connors MD  Principal Problem:ICD (implantable cardioverter-defibrillator) infection    Subjective:     Interval History: Patient seen and examined today at bedside. Upset about ongoing infection, Wound c/s positive for Enterobacter cloacae. Blood c/s negative. Slight drainage at ICD pocket site.  ID f/u appreciated- DC Vancomycin, changed to IV cefepime. As per ID if repeat positive, consider BRITTANY.  EP - plan for ICD removal, CTS consult for input and back up plan. EP follow up appreciated.  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   LDH in 640 -> 585 - Received 250 ml fluids on 7/4. Repeat TTE. LFT normal.  Will c/w coumadin. INR 1.5 -> 1.8 today. Coumadin 7.5 mg. Given risk of GI bleed, patient not a candidate for heparin bridge. Also given concern risk of bleeding, hold off on starting persantine. C/W ASA 81 mg daily. Likely LDH elevation due to acute phase reaction from infection.      Continuous Infusions:  Scheduled Meds:   allopurinoL  100 mg Oral Daily    amiodarone  200 mg Oral Daily    amLODIPine  10 mg Oral Daily    aspirin  81 mg Oral Daily    ceFEPime (MAXIPIME) IVPB  1 g Intravenous Q8H    colchicine  0.6 mg Oral Daily    doxazosin  8 mg Oral Q12H    ferrous gluconate  324 mg Oral Daily with lunch    fluticasone propionate  2 spray Each Nostril Daily    guanFACINE  1 mg Oral QHS    magnesium citrate  296 mL Oral Once    pantoprazole  40 mg Oral Daily with lunch    paroxetine  10 mg Oral QAM    potassium chloride SA  20 mEq Oral BID    predniSONE  2.5 mg Oral Daily    spironolactone  25 mg Oral Daily with lunch    [START ON 7/6/2020] torsemide  20 mg Oral Daily    vitamin  D  1,000 Units Oral Daily    warfarin  7.5 mg Oral Daily     PRN Meds:acetaminophen, polyethylene glycol, senna-docusate 8.6-50 mg, zolpidem    Review of patient's allergies indicates:   Allergen Reactions    Lisinopril Anaphylaxis    Hydralazine analogues      Chronic constipation, impotence, dizziness     Objective:     Vital Signs (Most Recent):  Temp: 97.7 °F (36.5 °C) (07/05/20 0800)  Pulse: 80 (07/05/20 0800)  Resp: 16 (07/05/20 0800)  BP: (!) 80/0 (07/05/20 0800)  SpO2: 96 % (07/05/20 0800) Vital Signs (24h Range):  Temp:  [97.7 °F (36.5 °C)-98.8 °F (37.1 °C)] 97.7 °F (36.5 °C)  Pulse:  [64-85] 80  Resp:  [16-20] 16  SpO2:  [92 %-98 %] 96 %  BP: (76-86)/(0) 80/0     Patient Vitals for the past 72 hrs (Last 3 readings):   Weight   07/05/20 0800 121.1 kg (266 lb 15.6 oz)   07/04/20 0751 122.3 kg (269 lb 10 oz)   07/03/20 0726 122.1 kg (269 lb 2.9 oz)     Body mass index is 35.22 kg/m².      Intake/Output Summary (Last 24 hours) at 7/5/2020 1016  Last data filed at 7/4/2020 2300  Gross per 24 hour   Intake 1105 ml   Output 1250 ml   Net -145 ml       Hemodynamic Parameters:       Telemetry: VAD artifact    Physical Exam  Vitals signs and nursing note reviewed.   Constitutional:       Appearance: Normal appearance.   HENT:      Head: Normocephalic and atraumatic.      Nose: No congestion.      Mouth/Throat:      Mouth: Mucous membranes are moist.   Eyes:      Extraocular Movements: Extraocular movements intact.      Pupils: Pupils are equal, round, and reactive to light.   Neck:      Musculoskeletal: Normal range of motion.   Cardiovascular:      Rate and Rhythm: Normal rate and regular rhythm.      Comments: Smooth VAD hum  Pulmonary:      Effort: Pulmonary effort is normal. No respiratory distress.      Breath sounds: Normal breath sounds. No rales.   Abdominal:      General: Abdomen is flat. Bowel sounds are normal. There is no distension.      Tenderness: There is no abdominal tenderness. There is no  guarding.   Musculoskeletal:         General: No swelling.   Skin:     General: Skin is warm.      Comments: ICD site- slightly tender, swelling noted.    Neurological:      General: No focal deficit present.      Mental Status: He is alert.   Psychiatric:         Mood and Affect: Mood normal.         Significant Labs:  CBC:  Recent Labs   Lab 07/03/20 0707 07/04/20 0513 07/05/20 0416   WBC 6.23 6.21 6.81   RBC 4.78 4.96 4.59*   HGB 12.7* 13.3* 12.6*   HCT 42.6 44.0 39.6*    244 234   MCV 89 89 86   MCH 26.6* 26.8* 27.5   MCHC 29.8* 30.2* 31.8*     BNP:  Recent Labs   Lab 07/03/20  0709   BNP 97     CMP:  Recent Labs   Lab 07/02/20  0354 07/03/20 0707 07/03/20 0709 07/04/20 0513 07/05/20 0415   GLU 81 93  --  78 68*   CALCIUM 9.3 9.2  --  9.6 9.6   ALBUMIN 3.5  --  3.5  --   --    PROT 7.1  --  7.2  --   --     136  --  137 137   K 3.5 3.9  --  3.7 4.0   CO2 25 25  --  26 26    102  --  100 102   BUN 13 13  --  12 13   CREATININE 1.9* 1.7*  --  1.7* 1.7*   ALKPHOS 86  --  94  --   --    ALT 17  --  19  --   --    AST 31  --  27  --   --    BILITOT 0.6  --  0.4  --   --       Coagulation:   Recent Labs   Lab 07/03/20 0707 07/04/20 0513 07/05/20 0415   INR 1.5* 1.5* 1.8*   APTT 27.2 31.1 35.4*     LDH:  Recent Labs   Lab 07/03/20 0707 07/04/20 0513 07/05/20 0415   * 642* 587*     Microbiology:  Microbiology Results (last 7 days)     Procedure Component Value Units Date/Time    Blood culture [[id number]] Collected: 07/03/20 0711    Order Status: Completed Specimen: Blood Updated: 07/05/20 0812     Blood Culture, Routine No Growth to date      No Growth to date      No Growth to date    Blood culture [[id number]] Collected: 07/03/20 0712    Order Status: Completed Specimen: Blood Updated: 07/05/20 0812     Blood Culture, Routine No Growth to date      No Growth to date      No Growth to date    Blood culture [[id number]] Collected: 07/02/20 1110    Order Status: Completed Specimen:  Blood Updated: 07/04/20 1412     Blood Culture, Routine No Growth to date      No Growth to date      No Growth to date    Aerobic culture [[id number]]  (Abnormal)  (Susceptibility) Collected: 07/01/20 2153    Order Status: Completed Specimen: Wound from Abdomen Updated: 07/04/20 1214     Aerobic Bacterial Culture ENTEROBACTER CLOACAE  Moderate  No other significant isolate      Narrative:      Left chest incision    Culture, Anaerobe [[id number]] Collected: 07/01/20 2153    Order Status: Completed Specimen: Wound from Abdomen Updated: 07/04/20 1000     Anaerobic Culture Culture in progress    Narrative:      Left chest incision.    Aerobic culture [[id number]] Collected: 07/01/20 1741    Order Status: Completed Specimen: Skin from Abdomen Updated: 07/03/20 1203     Aerobic Bacterial Culture No significant isolate    Culture, Anaerobe [[id number]] Collected: 07/01/20 1741    Order Status: Completed Specimen: Incision site from Chest, Left Updated: 07/02/20 1109     Anaerobic Culture Culture in progress    Culture, Anaerobe [[id number]] Collected: 07/01/20 1741    Order Status: Completed Specimen: Skin from Abdomen Updated: 07/02/20 0733     Anaerobic Culture Culture in progress    Narrative:      Drive line site    Culture, Anaerobe [[id number]]     Order Status: Canceled Specimen: Skin from Abdomen     Aerobic culture [[id number]]     Order Status: Canceled Specimen: Skin from Abdomen     Aerobic culture [[id number]] Collected: 07/01/20 1741    Order Status: Canceled Specimen: Incision site from Chest, Left           BMP:   Recent Labs   Lab 07/05/20  0415   GLU 68*      K 4.0      CO2 26   BUN 13   CREATININE 1.7*   CALCIUM 9.6   MG 2.0     Cardiac Markers: No results for input(s): CKMB, TROPONINT, MYOGLOBIN in the last 72 hours.  Coagulation:   Recent Labs   Lab 07/05/20  0415   INR 1.8*   APTT 35.4*     I have reviewed all pertinent labs within the past 24 hours.    Estimated Creatinine Clearance: 68.5  mL/min (A) (based on SCr of 1.7 mg/dL (H)).    Diagnostic Results:  I have reviewed all pertinent imaging results/findings within the past 24 hours.    Assessment and Plan:     55 yo AAM with DCM, HBP, CHF stage D s/p HM 2, ICD lead replacement 03/09/2020, hyperthyroidism,  sent in from clinic with a ICD pocket site infection. Patient wife reports yellow pus oozing from site this past week. Was prescribed antibiotics on Monday 6/29/20, and advised to come get admitted that day, but patient reported he didn't think it was necessary at that time. Denies fever, but reports intermittent nausea and chills, and Left side of Left hand tingling.      VAD parameters at baseline.  Pt speed decreased to 9800 rpms from 10,000 on 06/12/2020    ICD device check - 07/01/2020 --. Device interrogation revealed HV x 1 on 6/27/20 @ 3:41 pm for MMVT, Type 2 break.,CL-188 bpm. Patient reported sitting in car bending over when it occurred, not doing anything strenuous. Patient denies LOC. Patient reports overall feeling palpitations, tired, no energy, and COLEMAN, even prior to HV therapy.          * ICD (implantable cardioverter-defibrillator) infection  - presented with device pocket infection  - has been on oral doxycycline at home for the last 1 week with no improvement  - denies any fever but does admit to fatigue and chills   - labs pending   - ICD device check - 07/01/2020 --. Device interrogation revealed HV x 1 on 6/27/20 @ 3:41 pm for MMVT, Type 2 break.,CL-188 bpm.    Plan   - ID consulted who recommended IV vancomycin and IV ceftriaxone. C/w with recx. On 7/4 DC Vanco, c/w ceftriaxone. Pocket c/s positive for GNB  - Plan for EP to extract device, rec CTS consult  - will f/u blood and site cultures and based on the sensitivities will narrow the antibitoics  - EP informed who will plan an outpatient generator change and pocket site debridement.  - patient will stay over the weekend o get IV antibiotics and get the final results of  his culture.       Elevated LDH  LDH in 640 -> 585 after  250 ml fluids, reduced torsemide. BNP 97. Repeat TTE. LFT normal.  Will c/w coumadin. INR 1.5 -> 1.8 today. Coumadin 7.5 mg. Given risk of GI bleed, patient not a candidate for heparin bridge. Also given concern risk of bleeding, hold off on starting persantine. C/W ASA 81 mg daily. Likely LDH elevation as an acute phase reactant from infection.         VT (ventricular tachycardia)  - device check showed a VT episode with shock on 06/27  - patient denies any LOC   - c/w amiodarone 200 mg daily.   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 Repeat TTE. LFT normal.  Will c/w coumadin. INR 1.5 -> 1.8 today. Coumadin 7.5 mg. Given risk of GI bleed, patient not a candidate for heparin bridge. Also given concern risk of bleeding, hold off on starting persantine. C/W ASA 81 mg daily. Likely LDH elevation as an acute phase reactant from infection.   - INR 1.5 -> 1.8. Goal of 2-3   - given history of GI bleed in 2019 , c/w coumadin  - patient denies any dark urine and no obvious scleral icterus on exam  - drive site clean and dry but will send site cultures.    Procedure: Device Interrogation Including analysis of device parameters  Current Settings: Ventricular Assist Device  Review of device function is stable/unstable stable    TXP LVAD INTERROGATIONS 7/5/2020 7/4/2020 7/4/2020 7/4/2020 7/4/2020 7/3/2020 7/3/2020   Type HeartMate II HeartMate II - - HeartMate II HeartMate II HeartMate II   Flow 5.0 530 4.8 4.8 4.8 4.7 5.3   Speed 9800 9800 9800 9800 9800 9800 9800   PI 3.8 4.0 4.0 3.2 4.0 2.5 2.4   Power (Jackson) 6.0 6.2 6.0 6.1 6.0 6.0 6.1   LSL - 9400 9400 - - 9400 -   Pulsatility No Pulse No Pulse No Pulse No Pulse No Pulse No Pulse No Pulse       NICM (nonischemic cardiomyopathy)  - 7/4 bolus fluids. Reduce  Torsemide to 20 mg daily.   - keep K > 4 and Mg >2    Seen and d/w Dr. Lily Hopkins MD  Heart Transplant  [facility]

## 2020-07-05 NOTE — PLAN OF CARE
Patient receiving 1 gram of Cefepime IVP q 8 hrs per ID for ICD infection. Dressing is CDI. Patient LVAD HMII #'s WDL. Kit dressing every other day; due 7/5. Patient remains free from falls and injuries through out shift. Patient AAO and VSS. Patient denies chest pain and SOB. Plan of care reviewed with patient. Patient verbalizes understanding of plan. Will continue to monitor.

## 2020-07-05 NOTE — PROGRESS NOTES
[facility]  Infectious Disease  Progress Note    Patient Name: Tim Richards  MRN: [id number]  Admission Date: 7/1/2020  Length of Stay: 4 days  Attending Physician: Guerda Bautista MD  Primary Care Provider: Power Connors MD    Isolation Status: No active isolations     Assessment/Plan:      * ICD (implantable cardioverter-defibrillator) infection  Recurrent ICD pocket site infection following lead replacement 3/9/2020  Incision never fully closed    Aerobic Clx 07/01 growing Enterobacter.    Recommendations:     Would treat with cefepime x 2 weeks after device extraction. Will sign off. Thanks, CB        Kole Sterling MD  Infectious Disease  [facility]    Subjective:     Principal Problem:ICD (implantable cardioverter-defibrillator) infection    HPI: Tim Richards is a 54 y.o. male with past medical history of DCM, CHF stage D s/p HM2, VT with ICD and lead replacement on 3/9/2020 who presents with an ICD pocket infection. He reports surgical glue was used with the lead replacement, and the incision never fully closed. He had a previous infection since then in April and was treated with oral abx. Cultures negative at that time. He reports complete resolution of the infection after treatment at that time. However, over the past week he has noticed yellow non-odorous drainage from the site. He was prescribed oral doxycycline on 6/29/2020 and advised to get admitted for further treatment. He did not think it was necessary at that time and continued the doxycycline without improvement. He denies fever, aches, or chills. On 7/1/2020 evening, he was started on IV vancomycin and cefriaxone and site cultures obtained. Wound cx 7/1/2020 resulted positive for GNR. Vancomycin d/c 7/3/2020.  Interval History: Doing okay. Changed abx yesterday - no issues.    Review of Systems   Constitutional: Negative for chills and fever.   All other systems reviewed and are  negative.    Objective:     Vital Signs (Most Recent):  Temp: 97.7 °F (36.5 °C) (07/05/20 0800)  Pulse: 80 (07/05/20 0800)  Resp: 16 (07/05/20 0800)  BP: (!) 80/0 (07/05/20 0800)  SpO2: 96 % (07/05/20 0800) Vital Signs (24h Range):  Temp:  [97.7 °F (36.5 °C)-98.8 °F (37.1 °C)] 97.7 °F (36.5 °C)  Pulse:  [64-85] 80  Resp:  [16-20] 16  SpO2:  [92 %-98 %] 96 %  BP: (76-86)/(0) 80/0     Weight: 121.1 kg (266 lb 15.6 oz)  Body mass index is 35.22 kg/m².    Estimated Creatinine Clearance: 68.5 mL/min (A) (based on SCr of 1.7 mg/dL (H)).    Physical Exam  Vitals signs and nursing note reviewed.   Constitutional:       Appearance: Normal appearance.   HENT:      Head: Normocephalic and atraumatic.   Musculoskeletal:         General: No swelling.   Neurological:      General: No focal deficit present.      Mental Status: He is alert and oriented to person, place, and time. Mental status is at baseline.   Psychiatric:         Mood and Affect: Mood normal.         Behavior: Behavior normal.         Thought Content: Thought content normal.         Judgment: Judgment normal.         Significant Labs:   Blood Culture:   Recent Labs   Lab 03/11/20  0431 07/02/20  1110 07/03/20  0711 07/03/20  0712   LABBLOO No growth after 5 days.  No growth after 5 days. No Growth to date  No Growth to date  No Growth to date No Growth to date  No Growth to date  No Growth to date No Growth to date  No Growth to date  No Growth to date     CBC:   Recent Labs   Lab 07/04/20  0513 07/05/20  0416   WBC 6.21 6.81   HGB 13.3* 12.6*   HCT 44.0 39.6*    234     Wound Culture:   Recent Labs   Lab 07/01/20  1741 07/01/20  2153   LABAERO No significant isolate ENTEROBACTER CLOACAE  Moderate  No other significant isolate  *       Significant Imaging: I have reviewed all pertinent imaging results/findings within the past 24 hours.

## 2020-07-06 PROBLEM — M10.9 GOUT: Status: ACTIVE | Noted: 2020-07-06

## 2020-07-06 LAB
ALBUMIN SERPL BCP-MCNC: 3.4 G/DL (ref 3.5–5.2)
ALP SERPL-CCNC: 98 U/L (ref 55–135)
ALT SERPL W/O P-5'-P-CCNC: 21 U/L (ref 10–44)
ANION GAP SERPL CALC-SCNC: 10 MMOL/L (ref 8–16)
APTT BLDCRRT: 34.3 SEC (ref 21–32)
AST SERPL-CCNC: 24 U/L (ref 10–40)
BASOPHILS # BLD AUTO: 0.02 K/UL (ref 0–0.2)
BASOPHILS NFR BLD: 0.3 % (ref 0–1.9)
BILIRUB DIRECT SERPL-MCNC: 0.2 MG/DL (ref 0.1–0.3)
BILIRUB SERPL-MCNC: 0.4 MG/DL (ref 0.1–1)
BNP SERPL-MCNC: 124 PG/ML (ref 0–99)
BUN SERPL-MCNC: 15 MG/DL (ref 6–20)
CALCIUM SERPL-MCNC: 9.3 MG/DL (ref 8.7–10.5)
CHLORIDE SERPL-SCNC: 102 MMOL/L (ref 95–110)
CO2 SERPL-SCNC: 23 MMOL/L (ref 23–29)
CREAT SERPL-MCNC: 1.6 MG/DL (ref 0.5–1.4)
CRP SERPL-MCNC: 31.6 MG/L (ref 0–8.2)
DIFFERENTIAL METHOD: ABNORMAL
EOSINOPHIL # BLD AUTO: 0.1 K/UL (ref 0–0.5)
EOSINOPHIL NFR BLD: 1.8 % (ref 0–8)
ERYTHROCYTE [DISTWIDTH] IN BLOOD BY AUTOMATED COUNT: 15.2 % (ref 11.5–14.5)
EST. GFR  (AFRICAN AMERICAN): 56 ML/MIN/1.73 M^2
EST. GFR  (NON AFRICAN AMERICAN): 48.5 ML/MIN/1.73 M^2
GLUCOSE SERPL-MCNC: 89 MG/DL (ref 70–110)
HCT VFR BLD AUTO: 38.6 % (ref 40–54)
HGB BLD-MCNC: 11.9 G/DL (ref 14–18)
IMM GRANULOCYTES # BLD AUTO: 0.03 K/UL (ref 0–0.04)
IMM GRANULOCYTES NFR BLD AUTO: 0.5 % (ref 0–0.5)
INR PPP: 2.2 (ref 0.8–1.2)
LDH SERPL L TO P-CCNC: 492 U/L (ref 110–260)
LYMPHOCYTES # BLD AUTO: 0.9 K/UL (ref 1–4.8)
LYMPHOCYTES NFR BLD: 13.1 % (ref 18–48)
MAGNESIUM SERPL-MCNC: 2.3 MG/DL (ref 1.6–2.6)
MCH RBC QN AUTO: 27.5 PG (ref 27–31)
MCHC RBC AUTO-ENTMCNC: 30.8 G/DL (ref 32–36)
MCV RBC AUTO: 89 FL (ref 82–98)
MONOCYTES # BLD AUTO: 0.9 K/UL (ref 0.3–1)
MONOCYTES NFR BLD: 13.4 % (ref 4–15)
NEUTROPHILS # BLD AUTO: 4.7 K/UL (ref 1.8–7.7)
NEUTROPHILS NFR BLD: 70.9 % (ref 38–73)
NRBC BLD-RTO: 0 /100 WBC
PHOSPHATE SERPL-MCNC: 3 MG/DL (ref 2.7–4.5)
PLATELET # BLD AUTO: 207 K/UL (ref 150–350)
PMV BLD AUTO: 10.8 FL (ref 9.2–12.9)
POTASSIUM SERPL-SCNC: 4.1 MMOL/L (ref 3.5–5.1)
PREALB SERPL-MCNC: 22 MG/DL (ref 20–43)
PROT SERPL-MCNC: 7.1 G/DL (ref 6–8.4)
PROTHROMBIN TIME: 21 SEC (ref 9–12.5)
RBC # BLD AUTO: 4.32 M/UL (ref 4.6–6.2)
SODIUM SERPL-SCNC: 135 MMOL/L (ref 136–145)
WBC # BLD AUTO: 6.66 K/UL (ref 3.9–12.7)

## 2020-07-06 PROCEDURE — 76937 US GUIDE VASCULAR ACCESS: CPT

## 2020-07-06 PROCEDURE — 93750 PR INTERROGATE VENT ASSIST DEV, IN PERSON, W PHYSICIAN ANALYSIS: ICD-10-PCS | Mod: ,,, | Performed by: INTERNAL MEDICINE

## 2020-07-06 PROCEDURE — 93750 INTERROGATION VAD IN PERSON: CPT | Mod: ,,, | Performed by: INTERNAL MEDICINE

## 2020-07-06 PROCEDURE — 25000003 PHARM REV CODE 250: Performed by: INTERNAL MEDICINE

## 2020-07-06 PROCEDURE — 84134 ASSAY OF PREALBUMIN: CPT

## 2020-07-06 PROCEDURE — 25000003 PHARM REV CODE 250: Performed by: STUDENT IN AN ORGANIZED HEALTH CARE EDUCATION/TRAINING PROGRAM

## 2020-07-06 PROCEDURE — 83615 LACTATE (LD) (LDH) ENZYME: CPT

## 2020-07-06 PROCEDURE — 20600001 HC STEP DOWN PRIVATE ROOM

## 2020-07-06 PROCEDURE — C1751 CATH, INF, PER/CENT/MIDLINE: HCPCS

## 2020-07-06 PROCEDURE — 63600175 PHARM REV CODE 636 W HCPCS: Performed by: STUDENT IN AN ORGANIZED HEALTH CARE EDUCATION/TRAINING PROGRAM

## 2020-07-06 PROCEDURE — 99233 SBSQ HOSP IP/OBS HIGH 50: CPT | Mod: ,,, | Performed by: INTERNAL MEDICINE

## 2020-07-06 PROCEDURE — 85025 COMPLETE CBC W/AUTO DIFF WBC: CPT

## 2020-07-06 PROCEDURE — 86140 C-REACTIVE PROTEIN: CPT

## 2020-07-06 PROCEDURE — 25000003 PHARM REV CODE 250

## 2020-07-06 PROCEDURE — 27000248 HC VAD-ADDITIONAL DAY

## 2020-07-06 PROCEDURE — 63600175 PHARM REV CODE 636 W HCPCS: Performed by: INTERNAL MEDICINE

## 2020-07-06 PROCEDURE — 80076 HEPATIC FUNCTION PANEL: CPT

## 2020-07-06 PROCEDURE — 84100 ASSAY OF PHOSPHORUS: CPT

## 2020-07-06 PROCEDURE — 99233 PR SUBSEQUENT HOSPITAL CARE,LEVL III: ICD-10-PCS | Mod: ,,, | Performed by: INTERNAL MEDICINE

## 2020-07-06 PROCEDURE — 36573 INSJ PICC RS&I 5 YR+: CPT

## 2020-07-06 PROCEDURE — 85730 THROMBOPLASTIN TIME PARTIAL: CPT

## 2020-07-06 PROCEDURE — 83735 ASSAY OF MAGNESIUM: CPT

## 2020-07-06 PROCEDURE — 85610 PROTHROMBIN TIME: CPT

## 2020-07-06 PROCEDURE — 83880 ASSAY OF NATRIURETIC PEPTIDE: CPT

## 2020-07-06 PROCEDURE — 80048 BASIC METABOLIC PNL TOTAL CA: CPT

## 2020-07-06 PROCEDURE — 36415 COLL VENOUS BLD VENIPUNCTURE: CPT

## 2020-07-06 RX ORDER — SODIUM CHLORIDE 0.9 % (FLUSH) 0.9 %
10 SYRINGE (ML) INJECTION
Status: DISCONTINUED | OUTPATIENT
Start: 2020-07-06 | End: 2020-07-10

## 2020-07-06 RX ORDER — SODIUM CHLORIDE 0.9 % (FLUSH) 0.9 %
10 SYRINGE (ML) INJECTION EVERY 6 HOURS
Status: DISCONTINUED | OUTPATIENT
Start: 2020-07-06 | End: 2020-07-10

## 2020-07-06 RX ORDER — COLCHICINE 0.6 MG/1
0.6 TABLET, FILM COATED ORAL ONCE
Status: DISCONTINUED | OUTPATIENT
Start: 2020-07-06 | End: 2020-07-06

## 2020-07-06 RX ORDER — WARFARIN 3 MG/1
3 TABLET ORAL ONCE
Status: COMPLETED | OUTPATIENT
Start: 2020-07-06 | End: 2020-07-06

## 2020-07-06 RX ORDER — COLCHICINE 0.6 MG/1
1.2 TABLET, FILM COATED ORAL ONCE
Status: COMPLETED | OUTPATIENT
Start: 2020-07-06 | End: 2020-07-06

## 2020-07-06 RX ORDER — WARFARIN 2.5 MG/1
2.5 TABLET ORAL ONCE
Status: DISCONTINUED | OUTPATIENT
Start: 2020-07-06 | End: 2020-07-06

## 2020-07-06 RX ADMIN — WARFARIN SODIUM 3 MG: 3 TABLET ORAL at 04:07

## 2020-07-06 RX ADMIN — PAROXETINE HYDROCHLORIDE 10 MG: 10 TABLET, FILM COATED ORAL at 06:07

## 2020-07-06 RX ADMIN — GUANFACINE 1 MG: 1 TABLET ORAL at 08:07

## 2020-07-06 RX ADMIN — SPIRONOLACTONE 25 MG: 25 TABLET ORAL at 11:07

## 2020-07-06 RX ADMIN — CEFEPIME 1 G: 1 INJECTION, POWDER, FOR SOLUTION INTRAMUSCULAR; INTRAVENOUS at 11:07

## 2020-07-06 RX ADMIN — CEFEPIME 1 G: 1 INJECTION, POWDER, FOR SOLUTION INTRAMUSCULAR; INTRAVENOUS at 04:07

## 2020-07-06 RX ADMIN — COLCHICINE 1.2 MG: 0.6 TABLET, FILM COATED ORAL at 06:07

## 2020-07-06 RX ADMIN — AMLODIPINE BESYLATE 10 MG: 10 TABLET ORAL at 09:07

## 2020-07-06 RX ADMIN — POTASSIUM CHLORIDE 20 MEQ: 1500 TABLET, EXTENDED RELEASE ORAL at 08:07

## 2020-07-06 RX ADMIN — PANTOPRAZOLE SODIUM 40 MG: 40 TABLET, DELAYED RELEASE ORAL at 11:07

## 2020-07-06 RX ADMIN — DOXAZOSIN 8 MG: 8 TABLET ORAL at 09:07

## 2020-07-06 RX ADMIN — CEFEPIME 1 G: 1 INJECTION, POWDER, FOR SOLUTION INTRAMUSCULAR; INTRAVENOUS at 06:07

## 2020-07-06 RX ADMIN — MELATONIN 1000 UNITS: at 09:07

## 2020-07-06 RX ADMIN — DOXAZOSIN 8 MG: 8 TABLET ORAL at 08:07

## 2020-07-06 RX ADMIN — AMIODARONE HYDROCHLORIDE 200 MG: 200 TABLET ORAL at 08:07

## 2020-07-06 RX ADMIN — ACETAMINOPHEN 650 MG: 325 TABLET ORAL at 08:07

## 2020-07-06 RX ADMIN — PREDNISONE 2.5 MG: 2.5 TABLET ORAL at 08:07

## 2020-07-06 RX ADMIN — FERROUS GLUCONATE TAB 324 MG (37.5 MG ELEMENTAL IRON) 324 MG: 324 (37.5 FE) TAB at 11:07

## 2020-07-06 RX ADMIN — ACETAMINOPHEN 650 MG: 325 TABLET ORAL at 06:07

## 2020-07-06 RX ADMIN — ASPIRIN 81 MG: 81 TABLET, COATED ORAL at 08:07

## 2020-07-06 RX ADMIN — ALLOPURINOL 100 MG: 100 TABLET ORAL at 09:07

## 2020-07-06 RX ADMIN — COLCHICINE 0.6 MG: 0.6 TABLET, FILM COATED ORAL at 08:07

## 2020-07-06 RX ADMIN — ZOLPIDEM TARTRATE 10 MG: 5 TABLET ORAL at 08:07

## 2020-07-06 RX ADMIN — TORSEMIDE 20 MG: 20 TABLET ORAL at 08:07

## 2020-07-06 NOTE — SUBJECTIVE & OBJECTIVE
Interval History:   Patient seen and examined today at bedside.   Wound culture + for Enterobacter cloacae.   Blood culture remains negative.   Slight drainage at ICD pocket site-  on IV cefepime  LDH elevated yesterday, pending labs this morning        ROS  Objective:     Vital Signs (Most Recent):  Temp: 98.4 °F (36.9 °C) (07/05/20 2000)  Pulse: 73 (07/05/20 2000)  Resp: 18 (07/05/20 2000)  BP: (!) 80/0 (07/05/20 2000)  SpO2: 96 % (07/05/20 2000) Vital Signs (24h Range):  Temp:  [97.7 °F (36.5 °C)-98.4 °F (36.9 °C)] 98.4 °F (36.9 °C)  Pulse:  [67-89] 73  Resp:  [16-18] 18  SpO2:  [94 %-97 %] 96 %  BP: (74-80)/(0) 80/0     Weight: 120.7 kg (266 lb)  Body mass index is 35.09 kg/m².     SpO2: 96 %  O2 Device (Oxygen Therapy): room air    Physical Exam   Constitutional: He is oriented to person, place, and time. He appears well-developed and well-nourished. No distress.   HENT:   Head: Normocephalic.   Left Ear: External ear normal.   Eyes: Pupils are equal, round, and reactive to light. Right eye exhibits no discharge. Left eye exhibits no discharge.   Neck: Normal range of motion. No thyromegaly present.   Cardiovascular: Normal rate and regular rhythm. Exam reveals no friction rub.   Murmur heard.  Smooth VAD hum   Pulmonary/Chest: Effort normal and breath sounds normal. No respiratory distress.   Abdominal: Soft. Bowel sounds are normal. He exhibits no distension. There is no abdominal tenderness.   Musculoskeletal: Normal range of motion.         General: No edema.   Neurological: He is alert and oriented to person, place, and time.   Skin: Skin is warm and dry. No rash noted. No erythema.            Significant Labs:   CMP:   Recent Labs   Lab 07/05/20 0415      K 4.0      CO2 26   GLU 68*   BUN 13   CREATININE 1.7*   CALCIUM 9.6   ANIONGAP 9   ESTGFRAFRICA 52.1*   EGFRNONAA 45.0*   , CBC:   Recent Labs   Lab 07/05/20  0416   WBC 6.81   HGB 12.6*   HCT 39.6*       and INR:   Recent Labs   Lab  07/05/20  0415   INR 1.8*       Significant Imaging: Echocardiogram:   Transthoracic echo (TTE) complete (Cupid Only):   Results for orders placed or performed during the hospital encounter of 07/01/20   Echo Color Flow Doppler? Yes   Result Value Ref Range    Ascending aorta 3.34 cm    STJ 2.96 cm    IVS 1.04 0.6 - 1.1 cm    LA size 3.90 cm    Left Atrium Major Axis 6.81 cm    Left Atrium Minor Axis 6.81 cm    LVIDD 9.50 (A) 3.5 - 6.0 cm    LVIDS 9.37 (A) 2.1 - 4.0 cm    LVOT diameter 2.49 cm    PW 1.34 (A) 0.6 - 1.1 cm    MV Peak A Jorge 0.49 m/s    E wave decelartion time 112.55 msec    MV Peak E Jorge 0.78 m/s    RA Major Axis 5.76 cm    RA Width 4.15 cm    Sinus 3.16 cm    TR Max Jorge 2.09 m/s    TDI LATERAL 0.10 m/s    TDI SEPTAL 0.08 m/s    LA WIDTH 4.84 cm    MV stenosis pressure 1/2 time 32.64 ms    LV Diastolic Volume 547.08 mL    LV Systolic Volume 489.59 mL    RV S' 10.73 cm/s    LV LATERAL E/E' RATIO 7.80 m/s    LV SEPTAL E/E' RATIO 9.75 m/s    FS 1 %    LA volume 109.26 cm3    LV mass 682.26 g    Left Ventricle Relative Wall Thickness 0.28 cm    MV valve area p 1/2 method 6.74 cm2    E/A ratio 1.59     Mean e' 0.09 m/s    LVOT area 4.9 cm2    E/E' ratio 8.67 m/s    LV Systolic Volume Index 201.6 mL/m2    LV Diastolic Volume Index 225.22 mL/m2    LA Volume Index 45.0 mL/m2    LV Mass Index 281 g/m2    Triscuspid Valve Regurgitation Peak Gradient 17 mmHg    BSA 2.49 m2    Narrative    · Eccentric left ventricular hypertrophy. Severely decreased left   ventricular systolic function. The estimated ejection fraction is 10%.  · Grade I (mild) left ventricular diastolic dysfunction consistent with   impaired relaxation.  · Moderate left atrial enlargement.  · Small posterior pericardial effusion.  · Mild mitral regurgitation.  · Mild tricuspid regurgitation.  · LVAD present. Base speed is 9800 RPMs. The pump type is a Heartmate II.   The interventricular septum appears midline. LVEDD 9.5cm. Unable to   comment on  AV  · RV not well seen. Appears normal in size on limited images.     There is a suggestion of a mobile mass on a lead in the right atrium   (image 47)

## 2020-07-06 NOTE — PROGRESS NOTES
07/06/2020  Fernando Pederson    Current provider:  Amparo Lopez MD      I, Fernando Pederson, rounded on Tim Richards to ensure all mechanical assist device settings (IABP or VAD) were appropriate and all parameters were within limits.  I was able to ensure all back up equipment was present, the staff had no issues, and the Perfusion Department daily rounding was complete.    9:27 AM

## 2020-07-06 NOTE — ASSESSMENT & PLAN NOTE
- presented with device pocket infection  - has been on oral doxycycline at home for the last 1 week with no improvement  - denies any fever but does admit to fatigue and chills   - labs pending   - ICD device check - 07/01/2020 --. Device interrogation revealed HV x 1 on 6/27/20 @ 3:41 pm for MMVT, Type 2 break.,CL-188 bpm.    Plan   - ID consulted who recommended IV vancomycin and IV cefepime. On 7/4 DC Vanco, c/w cefepime. Wound c/s positive for Enterobacter cloacae. Blood c/s from 7/2 and 7/3 negative.  ID f/u appreciated- DC Vancomycin, changed to IV cefepime, continue 2 weeks post device extraction. Will need PICC on DC.  EP - plan for ICD removal, CTS consulted for input and back up plan. EP follow up appreciated.  - will f/u blood and site cultures and based on the sensitivities will narrow the antibitoics  - EP informed who will plan an outpatient generator change and pocket site debridement.  - patient will stay over the weekend o get IV antibiotics and get the final results of his culture.

## 2020-07-06 NOTE — CONSULTS
D/L PICC placed in R BASILIC vein, 43cm in length with 0cm exposed. Arm circumference 37cm. Lot#[id number]

## 2020-07-06 NOTE — SUBJECTIVE & OBJECTIVE
No current facility-administered medications on file prior to encounter.      Current Outpatient Medications on File Prior to Encounter   Medication Sig    allopurinol (ZYLOPRIM) 100 MG tablet TAKE 1 TABLET BY MOUTH EVERY DAY (Patient taking differently: Take 100 mg by mouth nightly. )    amiodarone (PACERONE) 400 MG tablet Take 1 tablet (400 mg total) by mouth once daily. (Patient taking differently: Take 200 mg by mouth once daily. )    amLODIPine (NORVASC) 10 MG tablet Take 1 tablet (10 mg total) by mouth once daily.    doxazosin (CARDURA) 8 MG Tab Take 1 tablet (8 mg total) by mouth every 12 (twelve) hours.    doxycycline (VIBRAMYCIN) 100 MG Cap Take 1 capsule (100 mg total) by mouth 2 (two) times daily. for 10 days    ferrous gluconate 324 mg (37.5 mg iron) Tab tablet Take 1 tablet (324 mg total) by mouth daily with breakfast. (Patient taking differently: Take 324 mg by mouth daily with lunch. )    guanFACINE (TENEX) 1 MG Tab Take 1 tablet (1 mg total) by mouth every evening.    pantoprazole (PROTONIX) 40 MG tablet TAKE 1 TABLET (40 MG TOTAL) BY MOUTH ONCE DAILY. (Patient taking differently: Take 40 mg by mouth daily with lunch. )    paroxetine (PAXIL) 10 MG tablet Take 1 tablet (10 mg total) by mouth every morning.    potassium chloride SA (K-DUR,KLOR-CON) 20 MEQ tablet Take 20 mEq by mouth 2 (two) times daily.    predniSONE (DELTASONE) 2.5 MG tablet Take 3 tablets (7.5 mg total) by mouth once daily for 10 days, THEN 2 tablets (5 mg total) once daily for 10 days, THEN 1 tablet (2.5 mg total) once daily for 15 days. Then discontinue..    spironolactone (ALDACTONE) 25 MG tablet Take 1 tablet (25 mg total) by mouth once daily. (Patient taking differently: Take 25 mg by mouth daily with lunch. )    torsemide (DEMADEX) 20 MG Tab TAKE 2 TABS BY MOUTH EVERY MORNING AND 1 TAB EVERY EVENING FOR 3 DAYS THEN 2 TABS DAILY THEREAFTER (Patient taking differently: Take 40 mg by mouth once daily. TAKE 2 TABS BY  MOUTH EVERY MORNING AND 1 TAB EVERY EVENING FOR 3 DAYS THEN 2 TABS DAILY THEREAFTER)    vitamin D (VITAMIN D3) 1000 units Tab Take 1,000 Units by mouth once daily.    zolpidem (AMBIEN) 10 mg Tab Take 1 tablet (10 mg total) by mouth nightly as needed.    aspirin (ECOTRIN) 81 MG EC tablet Take 1 tablet (81 mg total) by mouth once daily.    fluticasone propionate (FLONASE) 50 mcg/actuation nasal spray 2 sprays (100 mcg total) by Each Nostril route once daily.    sildenafiL (VIAGRA) 100 MG tablet Take 1 tablet (100 mg total) by mouth daily as needed for Erectile Dysfunction.    warfarin (COUMADIN) 5 MG tablet Take 5mg orally daily except 2.5mg orally on Monday / Fridays (Patient taking differently: Take 2.5 mg by mouth. Take 5mg orally daily except 2.5mg orally on Wednesdays and Saturdays)       Review of patient's allergies indicates:   Allergen Reactions    Lisinopril Anaphylaxis    Hydralazine analogues      Chronic constipation, impotence, dizziness       Past Medical History:   Diagnosis Date    Anticoagulant long-term use     CHF (congestive heart failure)     Dilated cardiomyopathy 1/10/2018    Disorder of kidney and ureter     CKD    Encounter for blood transfusion     Gout     HTN (hypertension)     Thyroid disease     Ventricular tachycardia (paroxysmal)      Past Surgical History:   Procedure Laterality Date    CARDIAC CATHETERIZATION  Dec. 2012    CARDIAC DEFIBRILLATOR PLACEMENT Left     CRRT-D    COLONOSCOPY N/A 3/6/2018    Procedure: COLONOSCOPY;  Surgeon: Alonso Bone MD;  Location: [address]);  Service: Endoscopy;  Laterality: N/A;    COLONOSCOPY N/A 7/17/2019    Procedure: COLONOSCOPY;  Surgeon: Blane Valdez MD;  Location: [address]);  Service: Endoscopy;  Laterality: N/A;    COLONOSCOPY N/A 7/18/2019    Procedure: COLONOSCOPY;  Surgeon: Blane Valdez MD;  Location: [address]);  Service: Endoscopy;  Laterality: N/A;    ESOPHAGOGASTRODUODENOSCOPY N/A  2019    Procedure: EGD (ESOPHAGOGASTRODUODENOSCOPY);  Surgeon: Blane Valdez MD;  Location: [facility] ENDO (2ND FLR);  Service: Endoscopy;  Laterality: N/A;    ESOPHAGOGASTRODUODENOSCOPY N/A 2019    Procedure: EGD (ESOPHAGOGASTRODUODENOSCOPY);  Surgeon: Blane Valdez MD;  Location: [facility] ENDO (2ND FLR);  Service: Endoscopy;  Laterality: N/A;    NONINVASIVE CARDIAC ELECTROPHYSIOLOGY STUDY N/A 10/18/2019    Procedure: CARDIAC ELECTROPHYSIOLOGY STUDY, NONINVASIVE;  Surgeon: Raz Wagner MD;  Location: [facility] EP LAB;  Service: Cardiology;  Laterality: N/A;  VT, DFTs, MDT CRTD in situ, LVAD, anes, MB, 3098    REPLACEMENT OF IMPLANTABLE CARDIOVERTER-DEFIBRILLATOR (ICD) GENERATOR N/A 3/9/2020    Procedure: REPLACEMENT, ICD GENERATOR;  Surgeon: Harry Yun MD;  Location: [facility] EP LAB;  Service: Cardiology;  Laterality: N/A;  VT, ICD Gen Change and Lead Revision, MDT, MAC, DM,3 Prep    REVISION OF IMPLANTABLE CARDIOVERTER-DEFIBRILLATOR (ICD) ELECTRODE LEAD PLACEMENT N/A 3/9/2020    Procedure: REVISION, INSERTION, ELECTRODE LEAD, ICD;  Surgeon: Harry Yun MD;  Location: [facility] EP LAB;  Service: Cardiology;  Laterality: N/A;  VT, ICD Gen Change and Lead Revision, MDT, MAC, DM,3 Prep    TREATMENT OF CARDIAC ARRHYTHMIA  10/18/2019    Procedure: Cardioversion or Defibrillation;  Surgeon: Raz Wagner MD;  Location: [facility] EP LAB;  Service: Cardiology;;     Family History     Problem Relation (Age of Onset)    Cancer Sister (54)    Coronary artery disease Father    Diabetes Father    Heart disease Father    Hypertension Father    No Known Problems Mother, Brother        Tobacco Use    Smoking status: Former Smoker     Packs/day: 1.00     Years: 31.00     Pack years: 31.00     Types: Cigarettes     Quit date: 2018     Years since quittin.4    Smokeless tobacco: Never Used    Tobacco comment: pt is quiting on his own - pt stated not qualified for program;  pt  quit on his own   Substance and  Sexual Activity    Alcohol use: No     Alcohol/week: 0.0 standard drinks     Frequency: Never     Drinks per session: Patient refused     Binge frequency: Never     Comment: one fifth of gin or rum/week    Drug use: No    Sexual activity: Yes     Partners: Female     Birth control/protection: None     Comment: 10/5/17  with same partner 7 years        Objective:     Vital Signs (Most Recent):  Temp: 97.7 °F (36.5 °C) (07/06/20 0745)  Pulse: 76 (07/06/20 1113)  Resp: 18 (07/06/20 0745)  BP: (!) 94/0 (07/06/20 0745)  SpO2: 99 % (07/06/20 0745) Vital Signs (24h Range):  Temp:  [97.7 °F (36.5 °C)-98.4 °F (36.9 °C)] 97.7 °F (36.5 °C)  Pulse:  [67-89] 76  Resp:  [16-18] 18  SpO2:  [94 %-99 %] 99 %  BP: (74-94)/(0) 94/0     Weight: 123.7 kg (272 lb 11.3 oz)  Body mass index is 35.98 kg/m².    SpO2: 99 %  O2 Device (Oxygen Therapy): room air     Intake/Output - Last 3 Shifts       07/04 0700 - 07/05 0659 07/05 0700 - 07/06 0659 07/06 0700 - 07/07 0659    P.O. 1345 1420 360    Total Intake(mL/kg) 1345 (11) 1420 (11.8) 360 (2.9)    Urine (mL/kg/hr) 1850 (0.6) 1250 (0.4) 250 (0.5)    Total Output 1850 1250 250    Net -505 +170 +110                  Lines/Drains/Airways     Line                 VAD 03/08/18 1124 Left ventricular assist device HeartMate  days          Peripheral Intravenous Line                 Peripheral IV - Single Lumen 07/01/20 1648 20 G;1 3/4 in Left;Anterior Forearm 4 days                 STS Risk Score: N/A          Significant Labs:  ABGs: No results for input(s): PH, PCO2, PO2, HCO3, POCSATURATED, BE in the last 48 hours.  Amylase: No results for input(s): AMYLASE in the last 48 hours.  BMP:   Recent Labs   Lab 07/06/20  0701   GLU 89   *   K 4.1      CO2 23   BUN 15   CREATININE 1.6*   CALCIUM 9.3   MG 2.3     Cardiac markers: No results for input(s): CKMB, CPKMB, TROPONINT, TROPONINI, MYOGLOBIN in the last 48 hours.  CBC:   Recent Labs   Lab 07/06/20  0701   WBC 6.66   RBC  4.32*   HGB 11.9*   HCT 38.6*      MCV 89   MCH 27.5   MCHC 30.8*     CMP:   Recent Labs   Lab 07/06/20 0701   GLU 89   CALCIUM 9.3   ALBUMIN 3.4*   PROT 7.1   *   K 4.1   CO2 23      BUN 15   CREATININE 1.6*   ALKPHOS 98   ALT 21   AST 24   BILITOT 0.4     Coagulation:   Recent Labs   Lab 07/06/20 0701   INR 2.2*   APTT 34.3*     Lactic Acid: No results for input(s): LACTATE in the last 48 hours.  LFTs:   Recent Labs   Lab 07/06/20 0701   ALT 21   AST 24   ALKPHOS 98   BILITOT 0.4   PROT 7.1   ALBUMIN 3.4*     Lipase: No results for input(s): LIPASE in the last 48 hours.    Significant Diagnostics: reviewed   TTE 7/5/2020  · Eccentric left ventricular hypertrophy. Severely decreased left ventricular systolic function. The estimated ejection fraction is 10%.  · Grade I (mild) left ventricular diastolic dysfunction consistent with impaired relaxation.  · Moderate left atrial enlargement.  · Small posterior pericardial effusion.  · Mild mitral regurgitation.  · Mild tricuspid regurgitation.  · LVAD present. Base speed is 9800 RPMs. The pump type is a Heartmate II. The interventricular septum appears midline. LVEDD 9.5cm. Unable to comment on AV  · RV not well seen. Appears normal in size on limited images.  There is a suggestion of a mobile mass on a lead in the right atrium (image 47)

## 2020-07-06 NOTE — ASSESSMENT & PLAN NOTE
LDH in 640 -> 585 -> 492 after  250 ml fluids, reduced torsemide. BNP 97. Repeat TTE. LFT normal.  Will c/w coumadin. INR 2.2 today. Coumadin 7.5 mg. Given risk of GI bleed, patient not a candidate for heparin bridge. Also given concern risk of bleeding, hold off on starting persantine. C/W ASA 81 mg daily. Likely LDH elevation as an acute phase reactant from infection.

## 2020-07-06 NOTE — PROGRESS NOTES
[facility]  Heart Transplant  Progress Note    Patient Name: Tim Richards  MRN: [id number]  Admission Date: 7/1/2020  Hospital Length of Stay: 5 days  Attending Physician: Amparo Lopez MD  Primary Care Provider: Power Connors MD  Principal Problem:ICD (implantable cardioverter-defibrillator) infection    Subjective:     Interval History: Patient seen and examined today at bedside. Wound c/s positive for Enterobacter cloacae. Blood c/s from 7/2 and 7/3 negative.  ID f/u appreciated- DC Vancomycin, changed to IV cefepime, continue 2 weeks post device extraction. Will need PICC on DC.  EP - plan for ICD removal, CTS consulted for input and back up plan. EP follow up appreciated.  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   LDH in 640 -> 585 -> 492 - Received 250 ml fluids on 7/4. Repeat TTE at 9800 with LV 9.5 cm, suggestion of a mobile mass on a lead in the atrium. LFT normal.  Will c/w coumadin. INR 2.2 today. Coumadin 7.5 mg. Given risk of GI bleed, patient not a candidate for heparin bridge. Also given concern risk of bleeding, hold off on starting persantine. C/W ASA 81 mg daily. Likely LDH elevation due to acute phase reaction from infection.  Gout- s/w colchicine        Continuous Infusions:  Scheduled Meds:   allopurinoL  100 mg Oral Daily    amiodarone  200 mg Oral Daily    amLODIPine  10 mg Oral Daily    aspirin  81 mg Oral Daily    ceFEPime (MAXIPIME) IVPB  1 g Intravenous Q8H    colchicine  0.6 mg Oral Daily    doxazosin  8 mg Oral Q12H    ferrous gluconate  324 mg Oral Daily with lunch    fluticasone propionate  2 spray Each Nostril Daily    guanFACINE  1 mg Oral QHS    magnesium citrate  296 mL Oral Once    pantoprazole  40 mg Oral Daily with lunch    paroxetine  10 mg Oral QAM    potassium chloride SA  20 mEq Oral BID    predniSONE  2.5 mg Oral Daily    spironolactone  25 mg Oral Daily  with lunch    torsemide  20 mg Oral Daily    vitamin D  1,000 Units Oral Daily    warfarin  3 mg Oral Once     PRN Meds:acetaminophen, polyethylene glycol, senna-docusate 8.6-50 mg, zolpidem    Review of patient's allergies indicates:   Allergen Reactions    Lisinopril Anaphylaxis    Hydralazine analogues      Chronic constipation, impotence, dizziness     Objective:     Vital Signs (Most Recent):  Temp: 97.9 °F (36.6 °C) (07/06/20 1151)  Pulse: 78 (07/06/20 1151)  Resp: 16 (07/06/20 1151)  BP: (!) 88/0 (07/06/20 1151)  SpO2: 99 % (07/06/20 1151) Vital Signs (24h Range):  Temp:  [97.7 °F (36.5 °C)-98.4 °F (36.9 °C)] 97.9 °F (36.6 °C)  Pulse:  [67-78] 78  Resp:  [16-18] 16  SpO2:  [94 %-99 %] 99 %  BP: (80-94)/(0) 88/0     Patient Vitals for the past 72 hrs (Last 3 readings):   Weight   07/06/20 0900 123.7 kg (272 lb 11.3 oz)   07/05/20 1357 120.7 kg (266 lb)   07/05/20 0800 121.1 kg (266 lb 15.6 oz)     Body mass index is 35.98 kg/m².      Intake/Output Summary (Last 24 hours) at 7/6/2020 1453  Last data filed at 7/6/2020 0900  Gross per 24 hour   Intake 1060 ml   Output 1050 ml   Net 10 ml       Hemodynamic Parameters:       Telemetry: VAD artifact    Physical Exam  Vitals signs and nursing note reviewed.   Constitutional:       Appearance: Normal appearance.   HENT:      Head: Normocephalic and atraumatic.      Nose: No congestion.      Mouth/Throat:      Mouth: Mucous membranes are moist.   Eyes:      Extraocular Movements: Extraocular movements intact.      Pupils: Pupils are equal, round, and reactive to light.   Neck:      Musculoskeletal: Normal range of motion.   Cardiovascular:      Rate and Rhythm: Normal rate and regular rhythm.      Comments: Smooth VAD hum  Pulmonary:      Effort: Pulmonary effort is normal. No respiratory distress.      Breath sounds: Normal breath sounds. No rales.   Abdominal:      General: Abdomen is flat. Bowel sounds are normal. There is no distension.      Tenderness: There  is no abdominal tenderness. There is no guarding.   Musculoskeletal:         General: No swelling.   Skin:     General: Skin is warm.      Comments: ICD site- slight tenderness, dressing in place.   Neurological:      General: No focal deficit present.      Mental Status: He is alert.   Psychiatric:         Mood and Affect: Mood normal.         Significant Labs:  CBC:  Recent Labs   Lab 07/04/20 0513 07/05/20 0416 07/06/20 0701   WBC 6.21 6.81 6.66   RBC 4.96 4.59* 4.32*   HGB 13.3* 12.6* 11.9*   HCT 44.0 39.6* 38.6*    234 207   MCV 89 86 89   MCH 26.8* 27.5 27.5   MCHC 30.2* 31.8* 30.8*     BNP:  Recent Labs   Lab 07/03/20  0709 07/06/20  0702   BNP 97 124*     CMP:  Recent Labs   Lab 07/02/20  0354  07/03/20  0709 07/04/20 0513 07/05/20 0415 07/06/20 0701   GLU 81   < >  --  78 68* 89   CALCIUM 9.3   < >  --  9.6 9.6 9.3   ALBUMIN 3.5  --  3.5  --   --  3.4*   PROT 7.1  --  7.2  --   --  7.1      < >  --  137 137 135*   K 3.5   < >  --  3.7 4.0 4.1   CO2 25   < >  --  26 26 23      < >  --  100 102 102   BUN 13   < >  --  12 13 15   CREATININE 1.9*   < >  --  1.7* 1.7* 1.6*   ALKPHOS 86  --  94  --   --  98   ALT 17  --  19  --   --  21   AST 31  --  27  --   --  24   BILITOT 0.6  --  0.4  --   --  0.4    < > = values in this interval not displayed.      Coagulation:   Recent Labs   Lab 07/04/20 0513 07/05/20 0415 07/06/20 0701   INR 1.5* 1.8* 2.2*   APTT 31.1 35.4* 34.3*     LDH:  Recent Labs   Lab 07/04/20  0513 07/05/20  0415 07/06/20  0701   * 587* 492*     Microbiology:  Microbiology Results (last 7 days)     Procedure Component Value Units Date/Time    Blood culture [[id number]] Collected: 07/02/20 1110    Order Status: Completed Specimen: Blood Updated: 07/06/20 1412     Blood Culture, Routine No Growth to date      No Growth to date      No Growth to date      No Growth to date      No Growth to date    Culture, Anaerobe [[id number]] Collected: 07/01/20 1741    Order  Status: Completed Specimen: Skin from Abdomen Updated: 07/06/20 1022     Anaerobic Culture Culture in progress    Narrative:      Drive line site    Blood culture [[id number]] Collected: 07/03/20 0712    Order Status: Completed Specimen: Blood Updated: 07/06/20 0812     Blood Culture, Routine No Growth to date      No Growth to date      No Growth to date      No Growth to date    Blood culture [[id number]] Collected: 07/03/20 0711    Order Status: Completed Specimen: Blood Updated: 07/06/20 0812     Blood Culture, Routine No Growth to date      No Growth to date      No Growth to date      No Growth to date    Aerobic culture [[id number]]  (Abnormal)  (Susceptibility) Collected: 07/01/20 2153    Order Status: Completed Specimen: Wound from Abdomen Updated: 07/04/20 1214     Aerobic Bacterial Culture ENTEROBACTER CLOACAE  Moderate  No other significant isolate      Narrative:      Left chest incision    Culture, Anaerobe [[id number]] Collected: 07/01/20 2153    Order Status: Completed Specimen: Wound from Abdomen Updated: 07/04/20 1000     Anaerobic Culture Culture in progress    Narrative:      Left chest incision.    Aerobic culture [[id number]] Collected: 07/01/20 1741    Order Status: Completed Specimen: Skin from Abdomen Updated: 07/03/20 1203     Aerobic Bacterial Culture No significant isolate    Culture, Anaerobe [[id number]] Collected: 07/01/20 1741    Order Status: Completed Specimen: Incision site from Chest, Left Updated: 07/02/20 1109     Anaerobic Culture Culture in progress    Culture, Anaerobe [[id number]]     Order Status: Canceled Specimen: Skin from Abdomen     Aerobic culture [[id number]]     Order Status: Canceled Specimen: Skin from Abdomen     Aerobic culture [[id number]] Collected: 07/01/20 1741    Order Status: Canceled Specimen: Incision site from Chest, Left           BMP:   Recent Labs   Lab 07/06/20  0701   GLU 89   *   K 4.1      CO2 23   BUN 15   CREATININE 1.6*   CALCIUM 9.3   MG  2.3     Cardiac Markers: No results for input(s): CKMB, TROPONINT, MYOGLOBIN in the last 72 hours.  Coagulation:   Recent Labs   Lab 07/06/20  0701   INR 2.2*   APTT 34.3*     I have reviewed all pertinent labs within the past 24 hours.    Estimated Creatinine Clearance: 73.6 mL/min (A) (based on SCr of 1.6 mg/dL (H)).    Diagnostic Results:  I have reviewed all pertinent imaging results/findings within the past 24 hours.    Assessment and Plan:     53 yo AAM with DCM, HBP, CHF stage D s/p HM 2, ICD lead replacement 03/09/2020, hyperthyroidism,  sent in from clinic with a ICD pocket site infection. Patient wife reports yellow pus oozing from site this past week. Was prescribed antibiotics on Monday 6/29/20, and advised to come get admitted that day, but patient reported he didn't think it was necessary at that time. Denies fever, but reports intermittent nausea and chills, and Left side of Left hand tingling.      VAD parameters at baseline.  Pt speed decreased to 9800 rpms from 10,000 on 06/12/2020    ICD device check - 07/01/2020 --. Device interrogation revealed HV x 1 on 6/27/20 @ 3:41 pm for MMVT, Type 2 break.,CL-188 bpm. Patient reported sitting in car bending over when it occurred, not doing anything strenuous. Patient denies LOC. Patient reports overall feeling palpitations, tired, no energy, and COLEMAN, even prior to HV therapy.          * ICD (implantable cardioverter-defibrillator) infection  - presented with device pocket infection  - has been on oral doxycycline at home for the last 1 week with no improvement  - denies any fever but does admit to fatigue and chills   - labs pending   - ICD device check - 07/01/2020 --. Device interrogation revealed HV x 1 on 6/27/20 @ 3:41 pm for MMVT, Type 2 break.,CL-188 bpm.    Plan   - ID consulted who recommended IV vancomycin and IV cefepime. On 7/4 DC Vanco, c/w cefepime. Wound c/s positive for Enterobacter cloacae. Blood c/s from 7/2 and 7/3 negative.  ID f/u  appreciated- DC Vancomycin, changed to IV cefepime, continue 2 weeks post device extraction. Will need PICC on DC.  EP - plan for ICD removal, CTS consulted for input and back up plan. EP follow up appreciated.  - will f/u blood and site cultures and based on the sensitivities will narrow the antibitoics  - EP informed who will plan an outpatient generator change and pocket site debridement.  - patient will stay over the weekend o get IV antibiotics and get the final results of his culture.       Gout  - Uric acid  - S/W colchicine and allopurinol continued    Elevated LDH  LDH in 640 -> 585 -> 492 after  250 ml fluids, reduced torsemide. BNP 97. Repeat TTE. LFT normal.  Will c/w coumadin. INR 2.2 today. Coumadin 7.5 mg. Given risk of GI bleed, patient not a candidate for heparin bridge. Also given concern risk of bleeding, hold off on starting persantine. C/W ASA 81 mg daily. Likely LDH elevation as an acute phase reactant from infection.         VT (ventricular tachycardia)  - device check showed a VT episode with shock on 06/27  - patient denies any LOC   - c/w amiodarone 200 mg daily.   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 Repeat TTE. LFT normal.  Will c/w coumadin. INR 2.2. Coumadin 7.5 mg. Given risk of GI bleed, patient not a candidate for heparin bridge. Also given concern risk of bleeding, hold off on starting persantine. C/W ASA 81 mg daily. Likely LDH elevation as an acute phase reactant from infection.   - INR 2.2 Goal of 2-3   - c/w coumadin  - patient denies any dark urine and no obvious scleral icterus on exam  - drive site clean and dry but will send site cultures.    Procedure: Device Interrogation Including analysis of device parameters  Current Settings: Ventricular Assist Device  Review of device function is stable/unstable stable    TXP LVAD  INTERROGATIONS 7/6/2020 7/6/2020 7/6/2020 7/5/2020 7/5/2020 7/5/2020 7/5/2020   Type HeartMate II HeartMate II HeartMate II HeartMate II HeartMate II HeartMate II HeartMate II   Flow 5.1 4.8 4.9 5.1 5.3 5.0 5.0   Speed 9800 9800 9800 9800 9800 9800 9800   PI 4.9 4.3 4.3 3.3 4.2 4.1 3.8   Power (Jackson) 6.3 6.1 6.0 6.1 6.4 6.1 6.0   LSL - 9400 9400 - - - -   Pulsatility - No Pulse No Pulse - - No Pulse No Pulse       NICM (nonischemic cardiomyopathy)  - 7/4 bolus fluids. Reduce Torsemide to 20 mg daily.   - keep K > 4 and Mg >2    Seen and d/w Dr. John Hopkins MD  Heart Transplant  [facility]

## 2020-07-06 NOTE — PROCEDURES
"Tim Richards is a 54 y.o. male patient.    Temp: 98.2 °F (36.8 °C) (07/06/20 1620)  Pulse: 76 (07/06/20 1506)  Resp: 18 (07/06/20 1620)  BP: (!) 90/0 (07/06/20 1620)  SpO2: 99 % (07/06/20 1620)  Weight: 123.7 kg (272 lb 11.3 oz) (07/06/20 0900)  Height: 6' 1" (185.4 cm) (07/05/20 1357)    PICC  Date/Time: 7/6/2020 4:37 PM  Performed by: Karis Bary RN  Assisting provider: Yamile Cramer LPN  Consent Done: Yes  Time out: Immediately prior to procedure a time out was called to verify the correct patient, procedure, equipment, support staff and site/side marked as required  Indications: med administration and vascular access  Anesthesia: local infiltration  Local anesthetic: lidocaine 1% without epinephrine  Anesthetic Total (mL): 2  Preparation: skin prepped with ChloraPrep  Skin prep agent dried: skin prep agent completely dried prior to procedure  Sterile barriers: all five maximum sterile barriers used - cap, mask, sterile gown, sterile gloves, and large sterile sheet  Hand hygiene: hand hygiene performed prior to central venous catheter insertion  Location details: right basilic  Catheter type: double lumen  Catheter size: 5 Fr  Catheter Length: 43cm    Ultrasound guidance: yes  Vessel Caliber: medium and patent, compressibility normal  Vascular Doppler: not done  Needle advanced into vessel with real time Ultrasound guidance.  Guidewire confirmed in vessel.  Image recorded and saved.  Sterile sheath used.  Number of attempts: 1  Post-procedure: blood return through all ports, chlorhexidine patch and sterile dressing applied  Technical procedures used: 3CG  Specimens: No  Implants: No  Assessment: placement verified by x-ray  Complications: none          Yamile Cramer  7/6/2020  "

## 2020-07-06 NOTE — HPI
Tim Richards is a 54 y.o. male with past medical history of DCM, CHF stage D s/p HM2, VT with ICD and lead replacement on 3/9/2020 who presents with an ICD pocket infection. He reports surgical glue was used with the lead replacement, and the incision never fully closed. He had a previous infection since then in April and was treated with oral abx. Cultures negative at that time. He reports complete resolution of the infection after treatment at that time. However, over the past week he has noticed yellow non-odorous drainage from the site. He was prescribed oral doxycycline on 6/29/2020 and advised to get admitted for further treatment. He did not think it was necessary at that time and continued the doxycycline without improvement. He denies fever, aches, or chills. On 7/1/2020 evening, he was started on IV vancomycin and cefriaxone and site cultures obtained. Wound cx 7/1/2020 resulted positive for GNR. Vancomycin d/c 7/3/2020.CTS consulted for backup for ICD extraction.

## 2020-07-06 NOTE — PROGRESS NOTES
[facility]  Cardiac Electrophysiology  Progress Note    Admission Date: 7/1/2020  Code Status: Full Code   Attending Physician: Guerda Bautista MD   Expected Discharge Date: 7/6/2020  Principal Problem:ICD (implantable cardioverter-defibrillator) infection    Subjective:     Interval History:   -Patient seen and examined today at bedside.   -Wound culture + for Enterobacter cloacae.   -Blood culture remains negative.   -Slight drainage at ICD pocket site-  on IV cefepime  -LDH elevated yesterday, pending labs this morning    -Telemetry with evidence of PVCs, no VT noted (rate 80-100s)      ROS  Objective:     Vital Signs (Most Recent):  Temp: 98.4 °F (36.9 °C) (07/05/20 2000)  Pulse: 73 (07/05/20 2000)  Resp: 18 (07/05/20 2000)  BP: (!) 80/0 (07/05/20 2000)  SpO2: 96 % (07/05/20 2000) Vital Signs (24h Range):  Temp:  [97.7 °F (36.5 °C)-98.4 °F (36.9 °C)] 98.4 °F (36.9 °C)  Pulse:  [67-89] 73  Resp:  [16-18] 18  SpO2:  [94 %-97 %] 96 %  BP: (74-80)/(0) 80/0     Weight: 120.7 kg (266 lb)  Body mass index is 35.09 kg/m².     SpO2: 96 %  O2 Device (Oxygen Therapy): room air    Physical Exam   Constitutional: He is oriented to person, place, and time. He appears well-developed and well-nourished. No distress.   HENT:   Head: Normocephalic.   Left Ear: External ear normal.   Eyes: Pupils are equal, round, and reactive to light. Right eye exhibits no discharge. Left eye exhibits no discharge.   Neck: Normal range of motion. No thyromegaly present.   Cardiovascular: Normal rate and regular rhythm. Exam reveals no friction rub.   Murmur heard.  Smooth VAD hum   Pulmonary/Chest: Effort normal and breath sounds normal. No respiratory distress.   Abdominal: Soft. Bowel sounds are normal. He exhibits no distension. There is no abdominal tenderness.   Musculoskeletal: Normal range of motion.         General: No edema.   Neurological: He is alert and oriented to person, place, and time.   Skin: Skin is warm and  dry. No rash noted. No erythema.            Significant Labs:   CMP:   Recent Labs   Lab 07/05/20 0415      K 4.0      CO2 26   GLU 68*   BUN 13   CREATININE 1.7*   CALCIUM 9.6   ANIONGAP 9   ESTGFRAFRICA 52.1*   EGFRNONAA 45.0*   , CBC:   Recent Labs   Lab 07/05/20 0416   WBC 6.81   HGB 12.6*   HCT 39.6*       and INR:   Recent Labs   Lab 07/05/20 0415   INR 1.8*       Significant Imaging: Echocardiogram:   Transthoracic echo (TTE) complete (Cupid Only):   Results for orders placed or performed during the hospital encounter of 07/01/20   Echo Color Flow Doppler? Yes   Result Value Ref Range    Ascending aorta 3.34 cm    STJ 2.96 cm    IVS 1.04 0.6 - 1.1 cm    LA size 3.90 cm    Left Atrium Major Axis 6.81 cm    Left Atrium Minor Axis 6.81 cm    LVIDD 9.50 (A) 3.5 - 6.0 cm    LVIDS 9.37 (A) 2.1 - 4.0 cm    LVOT diameter 2.49 cm    PW 1.34 (A) 0.6 - 1.1 cm    MV Peak A Jorge 0.49 m/s    E wave decelartion time 112.55 msec    MV Peak E Jorge 0.78 m/s    RA Major Axis 5.76 cm    RA Width 4.15 cm    Sinus 3.16 cm    TR Max Jorge 2.09 m/s    TDI LATERAL 0.10 m/s    TDI SEPTAL 0.08 m/s    LA WIDTH 4.84 cm    MV stenosis pressure 1/2 time 32.64 ms    LV Diastolic Volume 547.08 mL    LV Systolic Volume 489.59 mL    RV S' 10.73 cm/s    LV LATERAL E/E' RATIO 7.80 m/s    LV SEPTAL E/E' RATIO 9.75 m/s    FS 1 %    LA volume 109.26 cm3    LV mass 682.26 g    Left Ventricle Relative Wall Thickness 0.28 cm    MV valve area p 1/2 method 6.74 cm2    E/A ratio 1.59     Mean e' 0.09 m/s    LVOT area 4.9 cm2    E/E' ratio 8.67 m/s    LV Systolic Volume Index 201.6 mL/m2    LV Diastolic Volume Index 225.22 mL/m2    LA Volume Index 45.0 mL/m2    LV Mass Index 281 g/m2    Triscuspid Valve Regurgitation Peak Gradient 17 mmHg    BSA 2.49 m2    Narrative    · Eccentric left ventricular hypertrophy. Severely decreased left   ventricular systolic function. The estimated ejection fraction is 10%.  · Grade I (mild) left  ventricular diastolic dysfunction consistent with   impaired relaxation.  · Moderate left atrial enlargement.  · Small posterior pericardial effusion.  · Mild mitral regurgitation.  · Mild tricuspid regurgitation.  · LVAD present. Base speed is 9800 RPMs. The pump type is a Heartmate II.   The interventricular septum appears midline. LVEDD 9.5cm. Unable to   comment on AV  · RV not well seen. Appears normal in size on limited images.     There is a suggestion of a mobile mass on a lead in the right atrium   (image 47)       Assessment and Plan:   Mr. Richards is a 54M with h/o DCM, CHF stage D s/p HM2, VT with ICD and lead replacement on 3/9/2020 who presents with an ICD pocket infection.     * ICD (implantable cardioverter-defibrillator) infection  BiV-Icd Medtronic (2014 Dr. Chiu), successful DFT at 35J on 10/18/2019. Multiple VT episodes,  generator change (BiV ICD -> dual ICD),  device revision (addition of a new RV/ICD lead) and another VF induction via ICD on 3/2020. Presented from device clinic with device erosion.     - Echo done 7/5 Eccentric left ventricular hypertrophy. Severely decreased left ventricular systolic function. EF 10%. LVAD present. Base speed is 9800 RPMs. Suggestion of a mobile mass on a lead in the right atrium   - Blood cultured negative to date  - Wound cultures growing Enterobacter.  - Antibiotic regimen per ID recommendations. Switched to Cefepime q8h.  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   - CT surgery evaluated patient and confirm no surgical back up will be present at lead removal.         - Plan for device extraction by Dr. Yun on Tuesday  7/14/20        Lana Garza MD  Cardiac Electrophysiology  [facility]

## 2020-07-06 NOTE — SUBJECTIVE & OBJECTIVE
Interval History: Patient seen and examined today at bedside. Wound c/s positive for Enterobacter cloacae. Blood c/s from 7/2 and 7/3 negative.  ID f/u appreciated- DC Vancomycin, changed to IV cefepime, continue 2 weeks post device extraction. Will need PICC on DC.  EP - plan for ICD removal, CTS consulted for input and back up plan. EP follow up appreciated.  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   LDH in 640 -> 585 -> 492 - Received 250 ml fluids on 7/4. Repeat TTE at 9800 with LV 9.5 cm, suggestion of a mobile mass on a lead in the atrium. LFT normal.  Will c/w coumadin. INR 2.2 today. Coumadin 7.5 mg. Given risk of GI bleed, patient not a candidate for heparin bridge. Also given concern risk of bleeding, hold off on starting persantine. C/W ASA 81 mg daily. Likely LDH elevation due to acute phase reaction from infection.  Gout- s/w colchicine        Continuous Infusions:  Scheduled Meds:   allopurinoL  100 mg Oral Daily    amiodarone  200 mg Oral Daily    amLODIPine  10 mg Oral Daily    aspirin  81 mg Oral Daily    ceFEPime (MAXIPIME) IVPB  1 g Intravenous Q8H    colchicine  0.6 mg Oral Daily    doxazosin  8 mg Oral Q12H    ferrous gluconate  324 mg Oral Daily with lunch    fluticasone propionate  2 spray Each Nostril Daily    guanFACINE  1 mg Oral QHS    magnesium citrate  296 mL Oral Once    pantoprazole  40 mg Oral Daily with lunch    paroxetine  10 mg Oral QAM    potassium chloride SA  20 mEq Oral BID    predniSONE  2.5 mg Oral Daily    spironolactone  25 mg Oral Daily with lunch    torsemide  20 mg Oral Daily    vitamin D  1,000 Units Oral Daily    warfarin  3 mg Oral Once     PRN Meds:acetaminophen, polyethylene glycol, senna-docusate 8.6-50 mg, zolpidem    Review of patient's allergies indicates:   Allergen Reactions    Lisinopril Anaphylaxis    Hydralazine analogues      Chronic constipation, impotence, dizziness      Objective:     Vital Signs (Most Recent):  Temp: 97.9 °F (36.6 °C) (07/06/20 1151)  Pulse: 78 (07/06/20 1151)  Resp: 16 (07/06/20 1151)  BP: (!) 88/0 (07/06/20 1151)  SpO2: 99 % (07/06/20 1151) Vital Signs (24h Range):  Temp:  [97.7 °F (36.5 °C)-98.4 °F (36.9 °C)] 97.9 °F (36.6 °C)  Pulse:  [67-78] 78  Resp:  [16-18] 16  SpO2:  [94 %-99 %] 99 %  BP: (80-94)/(0) 88/0     Patient Vitals for the past 72 hrs (Last 3 readings):   Weight   07/06/20 0900 123.7 kg (272 lb 11.3 oz)   07/05/20 1357 120.7 kg (266 lb)   07/05/20 0800 121.1 kg (266 lb 15.6 oz)     Body mass index is 35.98 kg/m².      Intake/Output Summary (Last 24 hours) at 7/6/2020 1453  Last data filed at 7/6/2020 0900  Gross per 24 hour   Intake 1060 ml   Output 1050 ml   Net 10 ml       Hemodynamic Parameters:       Telemetry: VAD artifact    Physical Exam  Vitals signs and nursing note reviewed.   Constitutional:       Appearance: Normal appearance.   HENT:      Head: Normocephalic and atraumatic.      Nose: No congestion.      Mouth/Throat:      Mouth: Mucous membranes are moist.   Eyes:      Extraocular Movements: Extraocular movements intact.      Pupils: Pupils are equal, round, and reactive to light.   Neck:      Musculoskeletal: Normal range of motion.   Cardiovascular:      Rate and Rhythm: Normal rate and regular rhythm.      Comments: Smooth VAD hum  Pulmonary:      Effort: Pulmonary effort is normal. No respiratory distress.      Breath sounds: Normal breath sounds. No rales.   Abdominal:      General: Abdomen is flat. Bowel sounds are normal. There is no distension.      Tenderness: There is no abdominal tenderness. There is no guarding.   Musculoskeletal:         General: No swelling.   Skin:     General: Skin is warm.      Comments: ICD site- slight tenderness, dressing in place.   Neurological:      General: No focal deficit present.      Mental Status: He is alert.   Psychiatric:         Mood and Affect: Mood normal.         Significant  Labs:  CBC:  Recent Labs   Lab 07/04/20 0513 07/05/20 0416 07/06/20 0701   WBC 6.21 6.81 6.66   RBC 4.96 4.59* 4.32*   HGB 13.3* 12.6* 11.9*   HCT 44.0 39.6* 38.6*    234 207   MCV 89 86 89   MCH 26.8* 27.5 27.5   MCHC 30.2* 31.8* 30.8*     BNP:  Recent Labs   Lab 07/03/20  0709 07/06/20  0702   BNP 97 124*     CMP:  Recent Labs   Lab 07/02/20  0354  07/03/20  0709 07/04/20 0513 07/05/20 0415 07/06/20 0701   GLU 81   < >  --  78 68* 89   CALCIUM 9.3   < >  --  9.6 9.6 9.3   ALBUMIN 3.5  --  3.5  --   --  3.4*   PROT 7.1  --  7.2  --   --  7.1      < >  --  137 137 135*   K 3.5   < >  --  3.7 4.0 4.1   CO2 25   < >  --  26 26 23      < >  --  100 102 102   BUN 13   < >  --  12 13 15   CREATININE 1.9*   < >  --  1.7* 1.7* 1.6*   ALKPHOS 86  --  94  --   --  98   ALT 17  --  19  --   --  21   AST 31  --  27  --   --  24   BILITOT 0.6  --  0.4  --   --  0.4    < > = values in this interval not displayed.      Coagulation:   Recent Labs   Lab 07/04/20 0513 07/05/20 0415 07/06/20 0701   INR 1.5* 1.8* 2.2*   APTT 31.1 35.4* 34.3*     LDH:  Recent Labs   Lab 07/04/20 0513 07/05/20 0415 07/06/20 0701   * 587* 492*     Microbiology:  Microbiology Results (last 7 days)     Procedure Component Value Units Date/Time    Blood culture [[id number]] Collected: 07/02/20 1110    Order Status: Completed Specimen: Blood Updated: 07/06/20 1412     Blood Culture, Routine No Growth to date      No Growth to date      No Growth to date      No Growth to date      No Growth to date    Culture, Anaerobe [[id number]] Collected: 07/01/20 1741    Order Status: Completed Specimen: Skin from Abdomen Updated: 07/06/20 1022     Anaerobic Culture Culture in progress    Narrative:      Drive line site    Blood culture [[id number]] Collected: 07/03/20 0712    Order Status: Completed Specimen: Blood Updated: 07/06/20 0812     Blood Culture, Routine No Growth to date      No Growth to date      No Growth to date      No  Growth to date    Blood culture [[id number]] Collected: 07/03/20 0711    Order Status: Completed Specimen: Blood Updated: 07/06/20 0812     Blood Culture, Routine No Growth to date      No Growth to date      No Growth to date      No Growth to date    Aerobic culture [[id number]]  (Abnormal)  (Susceptibility) Collected: 07/01/20 2153    Order Status: Completed Specimen: Wound from Abdomen Updated: 07/04/20 1214     Aerobic Bacterial Culture ENTEROBACTER CLOACAE  Moderate  No other significant isolate      Narrative:      Left chest incision    Culture, Anaerobe [[id number]] Collected: 07/01/20 2153    Order Status: Completed Specimen: Wound from Abdomen Updated: 07/04/20 1000     Anaerobic Culture Culture in progress    Narrative:      Left chest incision.    Aerobic culture [[id number]] Collected: 07/01/20 1741    Order Status: Completed Specimen: Skin from Abdomen Updated: 07/03/20 1203     Aerobic Bacterial Culture No significant isolate    Culture, Anaerobe [[id number]] Collected: 07/01/20 1741    Order Status: Completed Specimen: Incision site from Chest, Left Updated: 07/02/20 1109     Anaerobic Culture Culture in progress    Culture, Anaerobe [[id number]]     Order Status: Canceled Specimen: Skin from Abdomen     Aerobic culture [[id number]]     Order Status: Canceled Specimen: Skin from Abdomen     Aerobic culture [[id number]] Collected: 07/01/20 1741    Order Status: Canceled Specimen: Incision site from Chest, Left           BMP:   Recent Labs   Lab 07/06/20  0701   GLU 89   *   K 4.1      CO2 23   BUN 15   CREATININE 1.6*   CALCIUM 9.3   MG 2.3     Cardiac Markers: No results for input(s): CKMB, TROPONINT, MYOGLOBIN in the last 72 hours.  Coagulation:   Recent Labs   Lab 07/06/20  0701   INR 2.2*   APTT 34.3*     I have reviewed all pertinent labs within the past 24 hours.    Estimated Creatinine Clearance: 73.6 mL/min (A) (based on SCr of 1.6 mg/dL (H)).    Diagnostic Results:  I have reviewed all  pertinent imaging results/findings within the past 24 hours.

## 2020-07-06 NOTE — PLAN OF CARE
[facility]   [facility] Transplant[facility]   [address] [place] [address]   [phone] after hours          [phone] fax     VAD HOME  HEALTH ORDERS      Admit to Home Health    Diagnosis:   Patient Active Problem List   Diagnosis    Cigarette nicotine dependence without complication    Alcohol abuse    Pulmonary nodule seen on imaging study    Biventricular implantable cardioverter-defibrillator in situ    Chronic combined systolic and diastolic heart failure    High risk medications (amiodarone) long-term use    NICM (nonischemic cardiomyopathy)    CKD (chronic kidney disease), stage III    Hypertensive cardiovascular-renal disease, stage 1-4 or unspecified chronic kidney disease, with heart failure    LVAD (left ventricular assist device) present    Hyperglycemia    Hyperbilirubinemia    Anemia    Hypertension    Long term (current) use of anticoagulants    Left ventricular assist device (LVAD) complication    Pre-transplant evaluation for heart transplant    SOB (shortness of breath)    GIB (gastrointestinal bleeding)    Leukocytosis    Bloody diarrhea    Thrombocytopenia, unspecified    GI bleed    History of bleeding ulcers    Shortness of breath    VT (ventricular tachycardia)    Severe obesity (BMI 35.0-39.9) with comorbidity    Palpitations    Amiodarone-induced hyperthyroidism    Heart replaced by heart assist device    Presence of left ventricular assist device (LVAD)    Hyperthyroidism    Substance or medication-induced sleep disorder, insomnia type    VF (ventricular fibrillation)    Elevated serum lactate dehydrogenase (LDH)    Essential hypertension    CHICHI (obstructive sleep apnea)    ICD (implantable cardioverter-defibrillator) infection    Infection involving implantable cardioverter-defibrillator (ICD)    Elevated LDH    Other specified hypothyroidism    Gout       Patient is homebound due to:   NYHA Class IV HF. S/P LVAD placement.     Diet: Low Fat, Low cholesterol, 2Gm Na, Coumadin restrictions.    Acitivities: No Swimming, bathing, vacuuming, contact sports.    Fresh implants= Sternal Precautions    Nursing:   SN to complete comprehensive assessment including routine vital signs. Instruct on disease process and s/s of complications to report to MD. Review/verify medication list sent home with the patient at time of discharge  and instruct patient/caregiver as needed. Frequency may be adjusted depending on start of care date.    **LVAD driveline exit site dressing change is to be completed per LVAD patient/caregiver only**.    Notify MD if:  SBP > 120 or < 80;   MAP > 80 or < 65;   HR > 120 or < 60;   Temp > 101;   Weight gain >3lbs in 1 day or 5lbs in 1 week.    LABS:  SN to perform labs:  PT/INR, CBC, CMP, Mag, LDH, BNP on 7/15/2020   PT/INR per Coumadin clinic [phone].   Follow up INR date:   No Finger Sticks    HOME INFUSION THERAPY: Cefepime 1 gm IV every 8 hours ( 7/29/2020 - 2 weeks after ICD removal)    SN to perform Infusion Therapy/Central Line Care.  Review Central Line Care & Central Line Flush with patient.        Central line care:  Scrub the Hub: Prior to accessing the line, always perform a 30 second alcohol scrub  Each lumen of the central line is to be flushed at least daily with 10 mL Normal Saline and 3 mL Heparin flush (100 units/mL)  Skilled Nurse (SN) may draw blood from IV access  Blood Draw Procedure:   - Aspirate at least 5 mL of blood   - Discard   - Obtain specimen   - Change posiflow cap   - Flush with 20 mL Normal Saline followed by a                 3-5 mL Heparin flush (100 units/mL)  Central :   - Sterile dressing changes are done weekly and as needed.   - Use chlor-hexadine scrub to cleanse site, apply Biopatch to insertion site,       apply securement device dressing   - Posi-flow caps are changed weekly and after EVERY lab draw.   - If sterile  gauze is under dressing to control oozing,                 dressing change must be performed every 24 hours until gauze is not needed.        Physical Therapy to evaluate and treat. Evaluate for home safety and equipment needs; Establish/upgrade home exercise program. Perform / instruct on therapeutic exercises, gait training, transfer training, and Range of Motion.    Occupational Therapy to evaluate and treat. Evaluate home environment for safety and equipment needs. Perform/Instruct on transfers, ADL training, ROM, and therapeutic exercises.        Aide to provide assistance with personal care, ADLs, and vital signs    Send initial Home Health orders to HTS attending physician on call.  Send follow up questions to VAD clinic MD [phone] or [phone].

## 2020-07-06 NOTE — ASSESSMENT & PLAN NOTE
Tim Richards is a 54 y.o. male with past medical history of DCM, CHF stage D s/p HM2, VT with ICD and lead replacement on 3/9/2020 who presents with an ICD pocket infection. CTS consulted for backup ICD removal. Likely will not provide backup as patient would be a re-do sternotomy and team would not have adequate time to intervene should that be necessary. Dr. Hill to review and staff.

## 2020-07-06 NOTE — CONSULTS
[facility]  Cardiothoracic Surgery  Consult Note    Patient Name: Tim Richards  MRN: [id number]  Admission Date: 7/1/2020  Attending Physician: Amparo Lopez MD  Referring Provider: Antonio Hadley    Patient information was obtained from past medical records and ER records.     Inpatient consult to Cardiothoracic Surgery  Consult performed by: Josefina Mcclure PA-C  Consult ordered by: Lana Garza MD        Subjective:     Principal Problem: ICD (implantable cardioverter-defibrillator) infection    History of Present Illness: Tim Richards is a 54 y.o. male with past medical history of DCM, CHF stage D s/p HM2, VT with ICD and lead replacement on 3/9/2020 who presents with an ICD pocket infection. He reports surgical glue was used with the lead replacement, and the incision never fully closed. He had a previous infection since then in April and was treated with oral abx. Cultures negative at that time. He reports complete resolution of the infection after treatment at that time. However, over the past week he has noticed yellow non-odorous drainage from the site. He was prescribed oral doxycycline on 6/29/2020 and advised to get admitted for further treatment. He did not think it was necessary at that time and continued the doxycycline without improvement. He denies fever, aches, or chills. On 7/1/2020 evening, he was started on IV vancomycin and cefriaxone and site cultures obtained. Wound cx 7/1/2020 resulted positive for GNR. Vancomycin d/c 7/3/2020.CTS consulted for backup for ICD extraction.     No current facility-administered medications on file prior to encounter.      Current Outpatient Medications on File Prior to Encounter   Medication Sig    allopurinol (ZYLOPRIM) 100 MG tablet TAKE 1 TABLET BY MOUTH EVERY DAY (Patient taking differently: Take 100 mg by mouth nightly. )    amiodarone (PACERONE) 400 MG tablet Take 1 tablet (400 mg total) by mouth once  daily. (Patient taking differently: Take 200 mg by mouth once daily. )    amLODIPine (NORVASC) 10 MG tablet Take 1 tablet (10 mg total) by mouth once daily.    doxazosin (CARDURA) 8 MG Tab Take 1 tablet (8 mg total) by mouth every 12 (twelve) hours.    doxycycline (VIBRAMYCIN) 100 MG Cap Take 1 capsule (100 mg total) by mouth 2 (two) times daily. for 10 days    ferrous gluconate 324 mg (37.5 mg iron) Tab tablet Take 1 tablet (324 mg total) by mouth daily with breakfast. (Patient taking differently: Take 324 mg by mouth daily with lunch. )    guanFACINE (TENEX) 1 MG Tab Take 1 tablet (1 mg total) by mouth every evening.    pantoprazole (PROTONIX) 40 MG tablet TAKE 1 TABLET (40 MG TOTAL) BY MOUTH ONCE DAILY. (Patient taking differently: Take 40 mg by mouth daily with lunch. )    paroxetine (PAXIL) 10 MG tablet Take 1 tablet (10 mg total) by mouth every morning.    potassium chloride SA (K-DUR,KLOR-CON) 20 MEQ tablet Take 20 mEq by mouth 2 (two) times daily.    predniSONE (DELTASONE) 2.5 MG tablet Take 3 tablets (7.5 mg total) by mouth once daily for 10 days, THEN 2 tablets (5 mg total) once daily for 10 days, THEN 1 tablet (2.5 mg total) once daily for 15 days. Then discontinue..    spironolactone (ALDACTONE) 25 MG tablet Take 1 tablet (25 mg total) by mouth once daily. (Patient taking differently: Take 25 mg by mouth daily with lunch. )    torsemide (DEMADEX) 20 MG Tab TAKE 2 TABS BY MOUTH EVERY MORNING AND 1 TAB EVERY EVENING FOR 3 DAYS THEN 2 TABS DAILY THEREAFTER (Patient taking differently: Take 40 mg by mouth once daily. TAKE 2 TABS BY MOUTH EVERY MORNING AND 1 TAB EVERY EVENING FOR 3 DAYS THEN 2 TABS DAILY THEREAFTER)    vitamin D (VITAMIN D3) 1000 units Tab Take 1,000 Units by mouth once daily.    zolpidem (AMBIEN) 10 mg Tab Take 1 tablet (10 mg total) by mouth nightly as needed.    aspirin (ECOTRIN) 81 MG EC tablet Take 1 tablet (81 mg total) by mouth once daily.    fluticasone propionate  (FLONASE) 50 mcg/actuation nasal spray 2 sprays (100 mcg total) by Each Nostril route once daily.    sildenafiL (VIAGRA) 100 MG tablet Take 1 tablet (100 mg total) by mouth daily as needed for Erectile Dysfunction.    warfarin (COUMADIN) 5 MG tablet Take 5mg orally daily except 2.5mg orally on Monday / Fridays (Patient taking differently: Take 2.5 mg by mouth. Take 5mg orally daily except 2.5mg orally on Wednesdays and Saturdays)       Review of patient's allergies indicates:   Allergen Reactions    Lisinopril Anaphylaxis    Hydralazine analogues      Chronic constipation, impotence, dizziness       Past Medical History:   Diagnosis Date    Anticoagulant long-term use     CHF (congestive heart failure)     Dilated cardiomyopathy 1/10/2018    Disorder of kidney and ureter     CKD    Encounter for blood transfusion     Gout     HTN (hypertension)     Thyroid disease     Ventricular tachycardia (paroxysmal)      Past Surgical History:   Procedure Laterality Date    CARDIAC CATHETERIZATION  Dec. 2012    CARDIAC DEFIBRILLATOR PLACEMENT Left     CRRT-D    COLONOSCOPY N/A 3/6/2018    Procedure: COLONOSCOPY;  Surgeon: Alonso Bone MD;  Location: [address]);  Service: Endoscopy;  Laterality: N/A;    COLONOSCOPY N/A 7/17/2019    Procedure: COLONOSCOPY;  Surgeon: Blane Valdez MD;  Location: [facility] ([address] [place] [address]);  Service: Endoscopy;  Laterality: N/A;    COLONOSCOPY N/A 7/18/2019    Procedure: COLONOSCOPY;  Surgeon: Blane Valdez MD;  Location: [address]);  Service: Endoscopy;  Laterality: N/A;    ESOPHAGOGASTRODUODENOSCOPY N/A 7/17/2019    Procedure: EGD (ESOPHAGOGASTRODUODENOSCOPY);  Surgeon: Blane Valdez MD;  Location: [address]);  Service: Endoscopy;  Laterality: N/A;    ESOPHAGOGASTRODUODENOSCOPY N/A 7/18/2019    Procedure: EGD (ESOPHAGOGASTRODUODENOSCOPY);  Surgeon: Blane Valdez MD;  Location: [address]);  Service: Endoscopy;  Laterality: N/A;    NONINVASIVE CARDIAC  ELECTROPHYSIOLOGY STUDY N/A 10/18/2019    Procedure: CARDIAC ELECTROPHYSIOLOGY STUDY, NONINVASIVE;  Surgeon: Raz Wagner MD;  Location: [facility] EP LAB;  Service: Cardiology;  Laterality: N/A;  VT, DFTs, MDT CRTD in situ, LVAD, juarez MB, 3098    REPLACEMENT OF IMPLANTABLE CARDIOVERTER-DEFIBRILLATOR (ICD) GENERATOR N/A 3/9/2020    Procedure: REPLACEMENT, ICD GENERATOR;  Surgeon: Harry Yun MD;  Location: [facility] EP LAB;  Service: Cardiology;  Laterality: N/A;  VT, ICD Gen Change and Lead Revision, MDT, MAC, DM,3 Prep    REVISION OF IMPLANTABLE CARDIOVERTER-DEFIBRILLATOR (ICD) ELECTRODE LEAD PLACEMENT N/A 3/9/2020    Procedure: REVISION, INSERTION, ELECTRODE LEAD, ICD;  Surgeon: Harry Yun MD;  Location: [facility] EP LAB;  Service: Cardiology;  Laterality: N/A;  VT, ICD Gen Change and Lead Revision, MDT, MAC, DM,3 Prep    TREATMENT OF CARDIAC ARRHYTHMIA  10/18/2019    Procedure: Cardioversion or Defibrillation;  Surgeon: Raz Wagner MD;  Location: [facility] EP LAB;  Service: Cardiology;;     Family History     Problem Relation (Age of Onset)    Cancer Sister (54)    Coronary artery disease Father    Diabetes Father    Heart disease Father    Hypertension Father    No Known Problems Mother, Brother        Tobacco Use    Smoking status: Former Smoker     Packs/day: 1.00     Years: 31.00     Pack years: 31.00     Types: Cigarettes     Quit date: 2018     Years since quittin.4    Smokeless tobacco: Never Used    Tobacco comment: pt is quiting on his own - pt stated not qualified for program;  pt  quit on his own   Substance and Sexual Activity    Alcohol use: No     Alcohol/week: 0.0 standard drinks     Frequency: Never     Drinks per session: Patient refused     Binge frequency: Never     Comment: one fifth of gin or rum/week    Drug use: No    Sexual activity: Yes     Partners: Female     Birth control/protection: None     Comment: 10/5/17  with same partner 7 years         Objective:     Vital Signs (Most Recent):  Temp: 97.7 °F (36.5 °C) (07/06/20 0745)  Pulse: 76 (07/06/20 1113)  Resp: 18 (07/06/20 0745)  BP: (!) 94/0 (07/06/20 0745)  SpO2: 99 % (07/06/20 0745) Vital Signs (24h Range):  Temp:  [97.7 °F (36.5 °C)-98.4 °F (36.9 °C)] 97.7 °F (36.5 °C)  Pulse:  [67-89] 76  Resp:  [16-18] 18  SpO2:  [94 %-99 %] 99 %  BP: (74-94)/(0) 94/0     Weight: 123.7 kg (272 lb 11.3 oz)  Body mass index is 35.98 kg/m².    SpO2: 99 %  O2 Device (Oxygen Therapy): room air     Intake/Output - Last 3 Shifts       07/04 0700 - 07/05 0659 07/05 0700 - 07/06 0659 07/06 0700 - 07/07 0659    P.O. 1345 1420 360    Total Intake(mL/kg) 1345 (11) 1420 (11.8) 360 (2.9)    Urine (mL/kg/hr) 1850 (0.6) 1250 (0.4) 250 (0.5)    Total Output 1850 1250 250    Net -505 +170 +110                  Lines/Drains/Airways     Line                 VAD 03/08/18 1124 Left ventricular assist device HeartMate  days          Peripheral Intravenous Line                 Peripheral IV - Single Lumen 07/01/20 1648 20 G;1 3/4 in Left;Anterior Forearm 4 days                 STS Risk Score: N/A          Significant Labs:  ABGs: No results for input(s): PH, PCO2, PO2, HCO3, POCSATURATED, BE in the last 48 hours.  Amylase: No results for input(s): AMYLASE in the last 48 hours.  BMP:   Recent Labs   Lab 07/06/20  0701   GLU 89   *   K 4.1      CO2 23   BUN 15   CREATININE 1.6*   CALCIUM 9.3   MG 2.3     Cardiac markers: No results for input(s): CKMB, CPKMB, TROPONINT, TROPONINI, MYOGLOBIN in the last 48 hours.  CBC:   Recent Labs   Lab 07/06/20  0701   WBC 6.66   RBC 4.32*   HGB 11.9*   HCT 38.6*      MCV 89   MCH 27.5   MCHC 30.8*     CMP:   Recent Labs   Lab 07/06/20  0701   GLU 89   CALCIUM 9.3   ALBUMIN 3.4*   PROT 7.1   *   K 4.1   CO2 23      BUN 15   CREATININE 1.6*   ALKPHOS 98   ALT 21   AST 24   BILITOT 0.4     Coagulation:   Recent Labs   Lab 07/06/20  0701   INR 2.2*   APTT 34.3*      Lactic Acid: No results for input(s): LACTATE in the last 48 hours.  LFTs:   Recent Labs   Lab 07/06/20  0701   ALT 21   AST 24   ALKPHOS 98   BILITOT 0.4   PROT 7.1   ALBUMIN 3.4*     Lipase: No results for input(s): LIPASE in the last 48 hours.    Significant Diagnostics: reviewed   TTE 7/5/2020  · Eccentric left ventricular hypertrophy. Severely decreased left ventricular systolic function. The estimated ejection fraction is 10%.  · Grade I (mild) left ventricular diastolic dysfunction consistent with impaired relaxation.  · Moderate left atrial enlargement.  · Small posterior pericardial effusion.  · Mild mitral regurgitation.  · Mild tricuspid regurgitation.  · LVAD present. Base speed is 9800 RPMs. The pump type is a Heartmate II. The interventricular septum appears midline. LVEDD 9.5cm. Unable to comment on AV  · RV not well seen. Appears normal in size on limited images.  There is a suggestion of a mobile mass on a lead in the right atrium (image 47)           Assessment/Plan:     * ICD (implantable cardioverter-defibrillator) infection  Tim Richards is a 54 y.o. male with past medical history of DCM, CHF stage D s/p HM2, VT with ICD and lead replacement on 3/9/2020 who presents with an ICD pocket infection. CTS consulted for backup ICD removal. Likely will not provide backup as patient would be a re-do sternotomy and team would not have adequate time to intervene should that be necessary. Dr. Hill to review and staff.         Thank you for your consult. I will sign off. Please contact us if you have any additional questions.    Josefina Mcclure PA-C  Cardiothoracic Surgery  [facility]    I have seen the patient and reviewed the physician assistant's note above. I have personally interviewed and examined the patient at bedside and agree with the findings.     Due to prior LVAD implantation, rapid access to the chest in the face of lead removal complications would not be  possible, especially considering location of the outflow graft (easily injured in redo sternotomy).  Therefore, no surgical back up will be present at lead removal.  Please contact me with questions.      Kwame Hill MD  Cardiothoracic Surgery  [facility]

## 2020-07-06 NOTE — ASSESSMENT & PLAN NOTE
BiV-Icd Medtronic (2014 Dr. Chiu), successful DFT at 35J on 10/18/2019. Multiple VT episodes,  generator change (BiV ICD -> dual ICD),  device revision (addition of a new RV/ICD lead) and another VF induction via ICD on 3/2020. Presented from device clinic with device erosion.     - Echo done 7/5 Eccentric left ventricular hypertrophy. Severely decreased left ventricular systolic function. EF 10%. LVAD present. Base speed is 9800 RPMs. Evidence suggesting mobile mass on RA lead  - Blood cultured negative to date  - Wound cultures growing Enterobacter.  - Antibiotic regimen per ID recommendations. Switched to Cefepime q8h.  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   - CT surgery consulted for evaluation and back up plan recommendations regarding device extraction

## 2020-07-06 NOTE — PROGRESS NOTES
"Pt aaox3 while sitting up in bed with no family at bedside, wife on phone.  Pt denies any needs at this time. Reports "dealing with gout now too".   Encouraged pt to notify nurse if they have any questions, problems or concerns for LVAD coordinator.  Pt verbalized understanding and in agreement of plan. Will follow up with pt soon.    "

## 2020-07-06 NOTE — ASSESSMENT & PLAN NOTE
LDH in 640 -> 585 - Received 250 ml fluids on 7/4. Repeat TTE. LFT normal.  Will c/w coumadin. INR 2.2. Coumadin 7.5 mg. Given risk of GI bleed, patient not a candidate for heparin bridge. Also given concern risk of bleeding, hold off on starting persantine. C/W ASA 81 mg daily. Likely LDH elevation as an acute phase reactant from infection.   - INR 2.2 Goal of 2-3   - c/w coumadin  - patient denies any dark urine and no obvious scleral icterus on exam  - drive site clean and dry but will send site cultures.    Procedure: Device Interrogation Including analysis of device parameters  Current Settings: Ventricular Assist Device  Review of device function is stable/unstable stable    TXP LVAD INTERROGATIONS 7/6/2020 7/6/2020 7/6/2020 7/5/2020 7/5/2020 7/5/2020 7/5/2020   Type HeartMate II HeartMate II HeartMate II HeartMate II HeartMate II HeartMate II HeartMate II   Flow 5.1 4.8 4.9 5.1 5.3 5.0 5.0   Speed 9800 9800 9800 9800 9800 9800 9800   PI 4.9 4.3 4.3 3.3 4.2 4.1 3.8   Power ([place]) 6.3 6.1 6.0 6.1 6.4 6.1 6.0   [facility] - 9400 9400 - - - -   Pulsatility - No Pulse No Pulse - - No Pulse No Pulse

## 2020-07-06 NOTE — PROGRESS NOTES
UPDATE/DISCHARGE PLANNING    SW to Pts room for ongoing support & assessment of needs. Pt presents as AAO x4, calm, cooperative, and asking and answering questions appropriately. Pt accompanied by his spouse, also AAO x4. Pt & spouse v/u of plan for Pt to d/c home & with IV Abx. SW provided education on home-infusion set-up and f/u requirements. Reviewed closest [facility] to Pt w/Infusion Suite ([facility], [phone]  f: [phone]) & other infusion options. Pt voices agreement with referral to [facility] but requests home health referral for home PICC care. Pt voices no preference for HH & agreement w/referral to [facility]. SW provided active listening & emotional support as Pt and spouse voiced frustrations r/t ongoing ICD infection, discovery, and treatment. Pt with no other needs or concerns at this time.     SW faxed orders for IV Abx to [facility] ([phone]  f: [phone]). Left VM for Carmencita with [facility] ([phone]) to advise of HH needs.     SW providing ongoing psychosocial, counseling, & emotional support, education, resources, assistance, and discharge planning as indicated.  SW to continue to follow.

## 2020-07-06 NOTE — PLAN OF CARE
Pt is A, A, Ox4. Calm, cooperative. Free of falls, trauma, and injuries. Skin intact. Pt educated on treatment plan. Pt demonstrates and verbalizes understanding. VSS. Gout pain to R wrist. HM 2, kit QOD. Plan for IV abx and surgery on ICD next week. PICC placed. SR on tele. Room air. LCW dressing changed. Do not disturb 11p-4a. Plan of care reviewed with pt.

## 2020-07-06 NOTE — PLAN OF CARE
Patient continued on Cefepime q 8 hrs for ICD infection; dressing is CDI.. changed twice during day shift. Patient LVAD HMII #'s WDL. Kit dressing every other day; due 7/7. Patient remains free from falls and injuries through out shift. Patient AAO and VSS. Patient denies chest pain and SOB. Plan of care reviewed with patient. Patient verbalizes understanding of plan. Will continue to monitor.

## 2020-07-07 ENCOUNTER — TELEPHONE (OUTPATIENT)
Dept: PULMONOLOGY | Facility: CLINIC | Age: 55
End: 2020-07-07

## 2020-07-07 LAB
ANION GAP SERPL CALC-SCNC: 7 MMOL/L (ref 8–16)
APTT BLDCRRT: 34.9 SEC (ref 21–32)
BACTERIA BLD CULT: NORMAL
BACTERIA SPEC ANAEROBE CULT: ABNORMAL
BACTERIA SPEC ANAEROBE CULT: ABNORMAL
BASOPHILS # BLD AUTO: 0.02 K/UL (ref 0–0.2)
BASOPHILS NFR BLD: 0.3 % (ref 0–1.9)
BUN SERPL-MCNC: 13 MG/DL (ref 6–20)
CALCIUM SERPL-MCNC: 9.4 MG/DL (ref 8.7–10.5)
CHLORIDE SERPL-SCNC: 103 MMOL/L (ref 95–110)
CO2 SERPL-SCNC: 27 MMOL/L (ref 23–29)
CREAT SERPL-MCNC: 1.6 MG/DL (ref 0.5–1.4)
DIFFERENTIAL METHOD: ABNORMAL
EOSINOPHIL # BLD AUTO: 0.1 K/UL (ref 0–0.5)
EOSINOPHIL NFR BLD: 1.5 % (ref 0–8)
ERYTHROCYTE [DISTWIDTH] IN BLOOD BY AUTOMATED COUNT: 15.3 % (ref 11.5–14.5)
EST. GFR  (AFRICAN AMERICAN): 56 ML/MIN/1.73 M^2
EST. GFR  (NON AFRICAN AMERICAN): 48.5 ML/MIN/1.73 M^2
GLUCOSE SERPL-MCNC: 79 MG/DL (ref 70–110)
HCT VFR BLD AUTO: 40.5 % (ref 40–54)
HGB BLD-MCNC: 12.4 G/DL (ref 14–18)
IMM GRANULOCYTES # BLD AUTO: 0.04 K/UL (ref 0–0.04)
IMM GRANULOCYTES NFR BLD AUTO: 0.6 % (ref 0–0.5)
INR PPP: 2.2 (ref 0.8–1.2)
LDH SERPL L TO P-CCNC: 474 U/L (ref 110–260)
LYMPHOCYTES # BLD AUTO: 0.9 K/UL (ref 1–4.8)
LYMPHOCYTES NFR BLD: 13 % (ref 18–48)
MAGNESIUM SERPL-MCNC: 2.2 MG/DL (ref 1.6–2.6)
MCH RBC QN AUTO: 26.7 PG (ref 27–31)
MCHC RBC AUTO-ENTMCNC: 30.6 G/DL (ref 32–36)
MCV RBC AUTO: 87 FL (ref 82–98)
MONOCYTES # BLD AUTO: 1 K/UL (ref 0.3–1)
MONOCYTES NFR BLD: 14.3 % (ref 4–15)
NEUTROPHILS # BLD AUTO: 4.8 K/UL (ref 1.8–7.7)
NEUTROPHILS NFR BLD: 70.3 % (ref 38–73)
NRBC BLD-RTO: 0 /100 WBC
PHOSPHATE SERPL-MCNC: 3.1 MG/DL (ref 2.7–4.5)
PLATELET # BLD AUTO: 211 K/UL (ref 150–350)
PMV BLD AUTO: 10.2 FL (ref 9.2–12.9)
POTASSIUM SERPL-SCNC: 4.1 MMOL/L (ref 3.5–5.1)
PROTHROMBIN TIME: 21.1 SEC (ref 9–12.5)
RBC # BLD AUTO: 4.64 M/UL (ref 4.6–6.2)
SODIUM SERPL-SCNC: 137 MMOL/L (ref 136–145)
URATE SERPL-MCNC: 7.3 MG/DL (ref 3.4–7)
WBC # BLD AUTO: 6.83 K/UL (ref 3.9–12.7)

## 2020-07-07 PROCEDURE — 83735 ASSAY OF MAGNESIUM: CPT

## 2020-07-07 PROCEDURE — 85025 COMPLETE CBC W/AUTO DIFF WBC: CPT

## 2020-07-07 PROCEDURE — 84550 ASSAY OF BLOOD/URIC ACID: CPT

## 2020-07-07 PROCEDURE — 99233 SBSQ HOSP IP/OBS HIGH 50: CPT | Mod: ,,, | Performed by: INTERNAL MEDICINE

## 2020-07-07 PROCEDURE — 63600175 PHARM REV CODE 636 W HCPCS: Performed by: STUDENT IN AN ORGANIZED HEALTH CARE EDUCATION/TRAINING PROGRAM

## 2020-07-07 PROCEDURE — 20600001 HC STEP DOWN PRIVATE ROOM

## 2020-07-07 PROCEDURE — 83615 LACTATE (LD) (LDH) ENZYME: CPT

## 2020-07-07 PROCEDURE — 99233 PR SUBSEQUENT HOSPITAL CARE,LEVL III: ICD-10-PCS | Mod: ,,, | Performed by: INTERNAL MEDICINE

## 2020-07-07 PROCEDURE — 25000003 PHARM REV CODE 250

## 2020-07-07 PROCEDURE — 84100 ASSAY OF PHOSPHORUS: CPT

## 2020-07-07 PROCEDURE — A4216 STERILE WATER/SALINE, 10 ML: HCPCS | Performed by: INTERNAL MEDICINE

## 2020-07-07 PROCEDURE — 25000003 PHARM REV CODE 250: Performed by: INTERNAL MEDICINE

## 2020-07-07 PROCEDURE — 94761 N-INVAS EAR/PLS OXIMETRY MLT: CPT

## 2020-07-07 PROCEDURE — 93750 INTERROGATION VAD IN PERSON: CPT | Mod: ,,, | Performed by: INTERNAL MEDICINE

## 2020-07-07 PROCEDURE — 63600175 PHARM REV CODE 636 W HCPCS: Performed by: INTERNAL MEDICINE

## 2020-07-07 PROCEDURE — 80048 BASIC METABOLIC PNL TOTAL CA: CPT

## 2020-07-07 PROCEDURE — 85730 THROMBOPLASTIN TIME PARTIAL: CPT

## 2020-07-07 PROCEDURE — 85610 PROTHROMBIN TIME: CPT

## 2020-07-07 PROCEDURE — 27000248 HC VAD-ADDITIONAL DAY

## 2020-07-07 PROCEDURE — 93750 PR INTERROGATE VENT ASSIST DEV, IN PERSON, W PHYSICIAN ANALYSIS: ICD-10-PCS | Mod: ,,, | Performed by: INTERNAL MEDICINE

## 2020-07-07 PROCEDURE — 25000003 PHARM REV CODE 250: Performed by: STUDENT IN AN ORGANIZED HEALTH CARE EDUCATION/TRAINING PROGRAM

## 2020-07-07 RX ORDER — WARFARIN SODIUM 5 MG/1
5 TABLET ORAL DAILY
Status: DISCONTINUED | OUTPATIENT
Start: 2020-07-07 | End: 2020-07-10

## 2020-07-07 RX ORDER — GUANFACINE 1 MG/1
2 TABLET ORAL NIGHTLY
Status: DISCONTINUED | OUTPATIENT
Start: 2020-07-07 | End: 2020-07-10

## 2020-07-07 RX ORDER — GUANFACINE 1 MG/1
2 TABLET ORAL NIGHTLY
Status: DISCONTINUED | OUTPATIENT
Start: 2020-07-07 | End: 2020-07-07

## 2020-07-07 RX ADMIN — PAROXETINE HYDROCHLORIDE 10 MG: 10 TABLET, FILM COATED ORAL at 06:07

## 2020-07-07 RX ADMIN — WARFARIN SODIUM 5 MG: 5 TABLET ORAL at 05:07

## 2020-07-07 RX ADMIN — AMIODARONE HYDROCHLORIDE 200 MG: 200 TABLET ORAL at 09:07

## 2020-07-07 RX ADMIN — POTASSIUM CHLORIDE 20 MEQ: 1500 TABLET, EXTENDED RELEASE ORAL at 09:07

## 2020-07-07 RX ADMIN — AMLODIPINE BESYLATE 10 MG: 10 TABLET ORAL at 09:07

## 2020-07-07 RX ADMIN — MELATONIN 1000 UNITS: at 09:07

## 2020-07-07 RX ADMIN — COLCHICINE 0.6 MG: 0.6 TABLET, FILM COATED ORAL at 09:07

## 2020-07-07 RX ADMIN — PANTOPRAZOLE SODIUM 40 MG: 40 TABLET, DELAYED RELEASE ORAL at 01:07

## 2020-07-07 RX ADMIN — Medication 10 ML: at 05:07

## 2020-07-07 RX ADMIN — Medication 10 ML: at 12:07

## 2020-07-07 RX ADMIN — ASPIRIN 81 MG: 81 TABLET, COATED ORAL at 09:07

## 2020-07-07 RX ADMIN — SPIRONOLACTONE 25 MG: 25 TABLET ORAL at 01:07

## 2020-07-07 RX ADMIN — ZOLPIDEM TARTRATE 10 MG: 5 TABLET ORAL at 10:07

## 2020-07-07 RX ADMIN — Medication 10 ML: at 06:07

## 2020-07-07 RX ADMIN — DOXAZOSIN 8 MG: 8 TABLET ORAL at 09:07

## 2020-07-07 RX ADMIN — PREDNISONE 2.5 MG: 2.5 TABLET ORAL at 09:07

## 2020-07-07 RX ADMIN — CEFEPIME 1 G: 1 INJECTION, POWDER, FOR SOLUTION INTRAMUSCULAR; INTRAVENOUS at 10:07

## 2020-07-07 RX ADMIN — CEFEPIME 1 G: 1 INJECTION, POWDER, FOR SOLUTION INTRAMUSCULAR; INTRAVENOUS at 05:07

## 2020-07-07 RX ADMIN — TORSEMIDE 20 MG: 20 TABLET ORAL at 09:07

## 2020-07-07 RX ADMIN — CEFEPIME 1 G: 1 INJECTION, POWDER, FOR SOLUTION INTRAMUSCULAR; INTRAVENOUS at 06:07

## 2020-07-07 RX ADMIN — GUANFACINE 2 MG: 1 TABLET ORAL at 05:07

## 2020-07-07 RX ADMIN — FERROUS GLUCONATE TAB 324 MG (37.5 MG ELEMENTAL IRON) 324 MG: 324 (37.5 FE) TAB at 01:07

## 2020-07-07 RX ADMIN — ALLOPURINOL 100 MG: 100 TABLET ORAL at 09:07

## 2020-07-07 NOTE — ASSESSMENT & PLAN NOTE
LDH in 640 -> 585 -> 474- Received 250 ml fluids on 7/4. . LFT normal.  Will c/w coumadin. INR 2.2. Coumadin 7.5 mg. Given risk of GI bleed, patient not a candidate for heparin bridge. Also given concern risk of bleeding, hold off on starting persantine. C/W ASA 81 mg daily. Likely LDH elevation as an acute phase reactant from infection.   - INR 2.2 Goal of 2-3   - c/w coumadin  - patient denies any dark urine and no obvious scleral icterus on exam  - drive site clean and dry but will send site cultures.    Procedure: Device Interrogation Including analysis of device parameters  Current Settings: Ventricular Assist Device  Review of device function is stable/unstable stable    TXP LVAD INTERROGATIONS 7/7/2020 7/7/2020 7/6/2020 7/6/2020 7/6/2020 7/6/2020 7/6/2020   Type HeartMate II HeartMate II HeartMate II HeartMate II HeartMate II HeartMate II HeartMate II   Flow 5.1 5.1 3.3 5.0 5.1 4.8 4.9   Speed 9800 9800 9800 9800 9800 9800 9800   PI 3.0 3.9 5.0 4.8 4.9 4.3 4.3   Power ([place]) 6.3 6.2 6.1 6.2 6.3 6.1 6.0   LSL 9400 9400 9400 - - 9400 9400   Pulsatility No Pulse No Pulse No Pulse - - No Pulse No Pulse

## 2020-07-07 NOTE — PROGRESS NOTES
[facility]  Cardiac Electrophysiology  Progress Note    Admission Date: 7/1/2020  Code Status: Full Code   Attending Physician: Amparo Lopez MD   Expected Discharge Date: 7/10/2020  Principal Problem:ICD (implantable cardioverter-defibrillator) infection    Subjective:     Interval History:   -Patient evaluated at bedside  -PICC line in place. Understand plan is to go home with IV abx and scheduled tentatively for device extraction on Tuesday 7/14.  -Afebrile, no leukocytosis, Hb stable.   -INR 2.2, on warfarin 5mg, Cr 1.6  -Likely LDH elevation due to acute phase reaction from infection.      ROS  Objective:     Vital Signs (Most Recent):  Temp: 97.6 °F (36.4 °C) (07/07/20 0744)  Pulse: 68 (07/07/20 0717)  Resp: 18 (07/07/20 0744)  BP: (!) 90/0 (07/07/20 0744)  SpO2: (!) 89 % (07/07/20 0744) Vital Signs (24h Range):  Temp:  [97.6 °F (36.4 °C)-99 °F (37.2 °C)] 97.6 °F (36.4 °C)  Pulse:  [62-91] 68  Resp:  [16-18] 18  SpO2:  [89 %-99 %] 89 %  BP: (86-90)/(0) 90/0     Weight: 123.7 kg (272 lb 11.3 oz)  Body mass index is 35.98 kg/m².     SpO2: (!) 89 %  O2 Device (Oxygen Therapy): room air    Physical Exam   Constitutional: He is oriented to person, place, and time. He appears well-developed and well-nourished. No distress.   HENT:   Head: Normocephalic.   Left Ear: External ear normal.   Eyes: Pupils are equal, round, and reactive to light. Right eye exhibits no discharge. Left eye exhibits no discharge.   Neck: Normal range of motion. No thyromegaly present.   Cardiovascular: Normal rate and regular rhythm. Exam reveals no friction rub.   Murmur heard.  Smooth VAD hum   Pulmonary/Chest: Effort normal and breath sounds normal. No respiratory distress.   Abdominal: Soft. Bowel sounds are normal. He exhibits no distension. There is no abdominal tenderness.   Musculoskeletal: Normal range of motion.         General: No edema.   Neurological: He is alert and oriented to person, place, and time.    Skin: Skin is warm and dry. No rash noted. No erythema.            Significant Labs:   Blood Culture: No results for input(s): LABBLOO in the last 48 hours., CMP:   Recent Labs   Lab 07/06/20  0701 07/07/20  0545   * 137   K 4.1 4.1    103   CO2 23 27   GLU 89 79   BUN 15 13   CREATININE 1.6* 1.6*   CALCIUM 9.3 9.4   PROT 7.1  --    ALBUMIN 3.4*  --    BILITOT 0.4  --    ALKPHOS 98  --    AST 24  --    ALT 21  --    ANIONGAP 10 7*   ESTGFRAFRICA 56.0* 56.0*   EGFRNONAA 48.5* 48.5*   , CBC:   Recent Labs   Lab 07/06/20  0701 07/07/20  0545   WBC 6.66 6.83   HGB 11.9* 12.4*   HCT 38.6* 40.5    211    and INR:   Recent Labs   Lab 07/06/20 0701 07/07/20  0545   INR 2.2* 2.2*       Significant Imaging: Echocardiogram:   Transthoracic echo (TTE) complete (Cupid Only):   Results for orders placed or performed during the hospital encounter of 07/01/20   Echo Color Flow Doppler? Yes   Result Value Ref Range    Ascending aorta 3.34 cm    STJ 2.96 cm    IVS 1.04 0.6 - 1.1 cm    LA size 3.90 cm    Left Atrium Major Axis 6.81 cm    Left Atrium Minor Axis 6.81 cm    LVIDD 9.50 (A) 3.5 - 6.0 cm    LVIDS 9.37 (A) 2.1 - 4.0 cm    LVOT diameter 2.49 cm    PW 1.34 (A) 0.6 - 1.1 cm    MV Peak A Jorge 0.49 m/s    E wave decelartion time 112.55 msec    MV Peak E Jorge 0.78 m/s    RA Major Axis 5.76 cm    RA Width 4.15 cm    Sinus 3.16 cm    TR Max Jorge 2.09 m/s    TDI LATERAL 0.10 m/s    TDI SEPTAL 0.08 m/s    LA WIDTH 4.84 cm    MV stenosis pressure 1/2 time 32.64 ms    LV Diastolic Volume 547.08 mL    LV Systolic Volume 489.59 mL    RV S' 10.73 cm/s    LV LATERAL E/E' RATIO 7.80 m/s    LV SEPTAL E/E' RATIO 9.75 m/s    FS 1 %    LA volume 109.26 cm3    LV mass 682.26 g    Left Ventricle Relative Wall Thickness 0.28 cm    MV valve area p 1/2 method 6.74 cm2    E/A ratio 1.59     Mean e' 0.09 m/s    LVOT area 4.9 cm2    E/E' ratio 8.67 m/s    LV Systolic Volume Index 201.6 mL/m2    LV Diastolic Volume Index 225.22 mL/m2     LA Volume Index 45.0 mL/m2    LV Mass Index 281 g/m2    Triscuspid Valve Regurgitation Peak Gradient 17 mmHg    BSA 2.49 m2    Narrative    · Eccentric left ventricular hypertrophy. Severely decreased left   ventricular systolic function. The estimated ejection fraction is 10%.  · Grade I (mild) left ventricular diastolic dysfunction consistent with   impaired relaxation.  · Moderate left atrial enlargement.  · Small posterior pericardial effusion.  · Mild mitral regurgitation.  · Mild tricuspid regurgitation.  · LVAD present. Base speed is 9800 RPMs. The pump type is a Heartmate II.   The interventricular septum appears midline. LVEDD 9.5cm. Unable to   comment on AV  · RV not well seen. Appears normal in size on limited images.     There is a suggestion of a mobile mass on a lead in the right atrium   (image 47)       Assessment and Plan:     * ICD (implantable cardioverter-defibrillator) infection  BiV-Icd Med[employer]onic (2014 Dr. Chiu), successful DFT at 35J on 10/18/2019. Multiple VT episodes,  generator change (BiV ICD -> dual ICD),  device revision (addition of a new RV/ICD lead) and another VF induction via ICD on 3/2020. Presented from device clinic with device erosion.     - Echo done 7/5 Eccentric left ventricular hypertrophy. Severely decreased left ventricular systolic function. EF 10%. LVAD present. Base speed is 9800 RPMs. Evidence suggesting mobile mass on RA lead  - Blood cultured negative to date  - Wound cultures growing Enterobacter.  - Antibiotic regimen per ID recommendations. Switched to Cefepime q8h.  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   - CT surgery consulted  for evaluation and back up plan recommendations regarding device extraction. Given prior surgical history no surgical back up will be present at lead removal.  - Device extraction tentatively scheduled for Friday 7/10/20 with Dr. Jama Garza MD  Cardiac Electrophysiology  [facility]

## 2020-07-07 NOTE — PHYSICIAN QUERY
"PT Name: Tim Richards  MR #: [id number]    Physician Query Form - Heart  Condition Clarification     CDS/: Blas Ronquillo Jr, RN               Contact information:[email or web address]  This form is a permanent document in the medical record.     Query Date: July 7, 2020    By submitting this query, we are merely seeking further clarification of documentation. Please utilize your independent clinical judgment when addressing the question(s) below.    The medical record contains the following   Indicators     Supporting Clinical Findings Location in Medical Record   x BNP 97--->124 7/3-7/6 MAR    EF      Radiology findings     x Echo Results Eccentric left ventricular hypertrophy. Severely decreased left ventricular systolic function. The estimated ejection fraction is 10%.    Grade I (mild) left ventricular diastolic dysfunction consistent with impaired relaxation.   7/5 Echo Report        7/5 Echo Report    "Ascites" documented     x "SOB" or "COLEMAN" documented Respiratory: Negative for cough and shortness of breath.     7/1 Cardiac Electrophysiology Consult     "Hypoxia" documented     x Heart Failure documented CHF (congestive heart failure)    54 y.o. male with past medical history of DCM, CHF stage D s/p HM2   7/1 H&P    7/6 Cardiothoracic Surgery Progress Note   x "Edema" documented General: No edema.  7/1 Cardiac Electrophysiology Consult      x Diuretics/Meds torsemide tablet 40 mg  7/2-7/4 MAR      Treatment:      Other:      Heart failure (HF) can be acute, chronic or both. It is generally further specificed as systolic, diastolic, or combined. Lastly, it is important to identify an underlying etiology if known or suspected.     Common clues to acute exacerbation:  Rapidly progressive symptoms (w/in 2 weeks of presentation), using IV diuretics to treat, using supplemental O2, pulmonary edema on Xray, MI w/in 4 weeks, and/or BNP >500    Systolic Heart Failure: is defined as chart documentation " of a left ventricular ejection fraction (LVEF) less than 40%     Diastolic Heart Failure: is defined as a left ventricular ejection fraction (LVEF) greater than 40%   +      Evidence of diastolic dysfunction on echocardiography OR    Right heart catheterization wedge pressure above 12 mm Hg OR    Left heart catheterization left ventricular end diastolic pressure 18 mm Hg or above.    References: *American Heart Association    The clinical guidelines noted below are only system guidelines, and do not replace the providers clinical judgment.     Provider, please specify the diagnosis associated with above clinical findings  Specify the Congestive Heart Failure Diagnosis     [   ] Chronic Systolic Heart Failure - Pre-existing systolic HF diagnosis.  EF < 40%  without  acute HF symptoms documented      [ x  ] Chronic Combined Systolic and Diastolic Heart Failure     [   ] Other Type of Heart Failure (please specify type):     [  ] Clinically Undetermined                             Please document in your progress notes daily for the duration of treatment until resolved and include in your discharge summary.

## 2020-07-07 NOTE — PROGRESS NOTES
07/07/2020  Kole Mendoza    Current provider:  Amparo Lopez MD      I, Kole Mendoza, rounded on Tim Richards to ensure all mechanical assist device settings (IABP or VAD) were appropriate and all parameters were within limits.  I was able to ensure all back up equipment was present, the staff had no issues, and the Perfusion Department daily rounding was complete.    3:33 PM

## 2020-07-07 NOTE — SUBJECTIVE & OBJECTIVE
Interval History: Patient seen and examined today at bedside. Wound c/s positive for Enterobacter cloacae from 7/2. Blood c/s from 7/2 and 7/3 negative.  ID f/u appreciated- DC Vancomycin, changed to IV cefepime, continue 2 weeks post device extraction. PICC on 7/6/2020.  EP - plan for ICD removal on Friday, CTS consulted for input and back up plan. EP follow up appreciated. Life vest on discharge.  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   LDH in 640 -> 585 -> 492 -> 474- Received 250 ml fluids on 7/4. Repeat TTE at 9800 with LV 9.5 cm, suggestion of a mobile mass on a lead in the atrium. LFT normal.  Will c/w coumadin. INR 2.2 today. Increase guafacine to 2 mg qhs.  Gout- s/w colchicine improved.      Continuous Infusions:  Scheduled Meds:   allopurinoL  100 mg Oral Daily    amiodarone  200 mg Oral Daily    amLODIPine  10 mg Oral Daily    aspirin  81 mg Oral Daily    ceFEPime (MAXIPIME) IVPB  1 g Intravenous Q8H    colchicine  0.6 mg Oral Daily    doxazosin  8 mg Oral Q12H    ferrous gluconate  324 mg Oral Daily with lunch    fluticasone propionate  2 spray Each Nostril Daily    guanFACINE  2 mg Oral QHS    pantoprazole  40 mg Oral Daily with lunch    paroxetine  10 mg Oral QAM    potassium chloride SA  20 mEq Oral BID    predniSONE  2.5 mg Oral Daily    sodium chloride 0.9%  10 mL Intravenous Q6H    spironolactone  25 mg Oral Daily with lunch    torsemide  20 mg Oral Daily    vitamin D  1,000 Units Oral Daily    warfarin  5 mg Oral Daily     PRN Meds:acetaminophen, polyethylene glycol, senna-docusate 8.6-50 mg, Flushing PICC Protocol **AND** sodium chloride 0.9% **AND** sodium chloride 0.9%, zolpidem    Review of patient's allergies indicates:   Allergen Reactions    Lisinopril Anaphylaxis    Hydralazine analogues      Chronic constipation, impotence, dizziness     Objective:     Vital Signs (Most Recent):  Temp: 98 °F (36.7  °C) (07/07/20 1132)  Pulse: 72 (07/07/20 1132)  Resp: 18 (07/07/20 1132)  BP: (!) 86/0 (07/07/20 1132)  SpO2: (!) 93 % (07/07/20 1132) Vital Signs (24h Range):  Temp:  [97.6 °F (36.4 °C)-99 °F (37.2 °C)] 98 °F (36.7 °C)  Pulse:  [62-91] 72  Resp:  [18] 18  SpO2:  [93 %-99 %] 93 %  BP: (86-90)/(0) 86/0     Patient Vitals for the past 72 hrs (Last 3 readings):   Weight   07/06/20 0900 123.7 kg (272 lb 11.3 oz)   07/05/20 1357 120.7 kg (266 lb)   07/05/20 0800 121.1 kg (266 lb 15.6 oz)     Body mass index is 35.98 kg/m².      Intake/Output Summary (Last 24 hours) at 7/7/2020 1353  Last data filed at 7/6/2020 2200  Gross per 24 hour   Intake 240 ml   Output 2000 ml   Net -1760 ml       Hemodynamic Parameters:       Telemetry: VAD artifact    Physical Exam  Vitals signs and nursing note reviewed.   Constitutional:       Appearance: Normal appearance.   HENT:      Head: Normocephalic and atraumatic.      Nose: No congestion.      Mouth/Throat:      Mouth: Mucous membranes are moist.   Eyes:      Extraocular Movements: Extraocular movements intact.      Pupils: Pupils are equal, round, and reactive to light.   Neck:      Musculoskeletal: Normal range of motion.   Cardiovascular:      Rate and Rhythm: Normal rate and regular rhythm.      Comments: Smooth VAD hum  Pulmonary:      Effort: Pulmonary effort is normal. No respiratory distress.      Breath sounds: Normal breath sounds. No rales.   Abdominal:      General: Abdomen is flat. Bowel sounds are normal. There is no distension.      Tenderness: There is no abdominal tenderness. There is no guarding.   Musculoskeletal:         General: No swelling.   Skin:     General: Skin is warm.      Comments: ICD pocket site- redness, tenderness.    Neurological:      General: No focal deficit present.      Mental Status: He is alert.   Psychiatric:         Mood and Affect: Mood normal.         Significant Labs:  CBC:  Recent Labs   Lab 07/05/20  0416 07/06/20  0701 07/07/20  0545    WBC 6.81 6.66 6.83   RBC 4.59* 4.32* 4.64   HGB 12.6* 11.9* 12.4*   HCT 39.6* 38.6* 40.5    207 211   MCV 86 89 87   MCH 27.5 27.5 26.7*   MCHC 31.8* 30.8* 30.6*     BNP:  Recent Labs   Lab 07/03/20  0709 07/06/20  0702   BNP 97 124*     CMP:  Recent Labs   Lab 07/02/20  0354  07/03/20 0709 07/05/20 0415 07/06/20 0701 07/07/20  0545   GLU 81   < >  --    < > 68* 89 79   CALCIUM 9.3   < >  --    < > 9.6 9.3 9.4   ALBUMIN 3.5  --  3.5  --   --  3.4*  --    PROT 7.1  --  7.2  --   --  7.1  --       < >  --    < > 137 135* 137   K 3.5   < >  --    < > 4.0 4.1 4.1   CO2 25   < >  --    < > 26 23 27      < >  --    < > 102 102 103   BUN 13   < >  --    < > 13 15 13   CREATININE 1.9*   < >  --    < > 1.7* 1.6* 1.6*   ALKPHOS 86  --  94  --   --  98  --    ALT 17  --  19  --   --  21  --    AST 31  --  27  --   --  24  --    BILITOT 0.6  --  0.4  --   --  0.4  --     < > = values in this interval not displayed.      Coagulation:   Recent Labs   Lab 07/05/20 0415 07/06/20 0701 07/07/20  0545   INR 1.8* 2.2* 2.2*   APTT 35.4* 34.3* 34.9*     LDH:  Recent Labs   Lab 07/05/20 0415 07/06/20 0701 07/07/20  0545   * 492* 474*     Microbiology:  Microbiology Results (last 7 days)     Procedure Component Value Units Date/Time    Culture, Anaerobe [[id number]]  (Abnormal) Collected: 07/01/20 2153    Order Status: Completed Specimen: Wound from Abdomen Updated: 07/07/20 1316     Anaerobic Culture FUSOBACTERIUM NUCLEATUM  Moderate        PREVOTELLA (B.) MELANINOGENICA  Moderate      Narrative:      Left chest incision.    Culture, Anaerobe [[id number]] Collected: 07/01/20 1741    Order Status: Completed Specimen: Incision site from Chest, Left Updated: 07/07/20 1314     Anaerobic Culture Culture in progress    Blood culture [[id number]] Collected: 07/03/20 0712    Order Status: Completed Specimen: Blood Updated: 07/07/20 0812     Blood Culture, Routine No Growth to date      No Growth to date      No  Growth to date      No Growth to date      No Growth to date    Blood culture [[id number]] Collected: 07/03/20 0711    Order Status: Completed Specimen: Blood Updated: 07/07/20 0812     Blood Culture, Routine No Growth to date      No Growth to date      No Growth to date      No Growth to date      No Growth to date    Blood culture [[id number]] Collected: 07/02/20 1110    Order Status: Completed Specimen: Blood Updated: 07/06/20 1412     Blood Culture, Routine No Growth to date      No Growth to date      No Growth to date      No Growth to date      No Growth to date    Culture, Anaerobe [[id number]] Collected: 07/01/20 1741    Order Status: Completed Specimen: Skin from Abdomen Updated: 07/06/20 1022     Anaerobic Culture Culture in progress    Narrative:      Drive line site    Aerobic culture [[id number]]  (Abnormal)  (Susceptibility) Collected: 07/01/20 2153    Order Status: Completed Specimen: Wound from Abdomen Updated: 07/04/20 1214     Aerobic Bacterial Culture ENTEROBACTER CLOACAE  Moderate  No other significant isolate      Narrative:      Left chest incision    Aerobic culture [[id number]] Collected: 07/01/20 1741    Order Status: Completed Specimen: Skin from Abdomen Updated: 07/03/20 1203     Aerobic Bacterial Culture No significant isolate    Culture, Anaerobe [[id number]]     Order Status: Canceled Specimen: Skin from Abdomen     Aerobic culture [[id number]]     Order Status: Canceled Specimen: Skin from Abdomen     Aerobic culture [[id number]] Collected: 07/01/20 1741    Order Status: Canceled Specimen: Incision site from Chest, Left           BMP:   Recent Labs   Lab 07/07/20  0545   GLU 79      K 4.1      CO2 27   BUN 13   CREATININE 1.6*   CALCIUM 9.4   MG 2.2     Cardiac Markers: No results for input(s): CKMB, TROPONINT, MYOGLOBIN in the last 72 hours.  Coagulation:   Recent Labs   Lab 07/07/20  0545   INR 2.2*   APTT 34.9*     I have reviewed all pertinent labs within the past 24  hours.    Estimated Creatinine Clearance: 73.6 mL/min (A) (based on SCr of 1.6 mg/dL (H)).    Diagnostic Results:  I have reviewed all pertinent imaging results/findings within the past 24 hours.

## 2020-07-07 NOTE — PLAN OF CARE
Patient continued on abx therapy. CRISTÓBAL PICC placed 7/6/2020; plan home on abx. Extraction next weekPatient LVAD HMII #'s WDL. Kit dressing every other day; due 7/7. Patient remains free from falls and injuries through out shift. Patient AAO and VSS. Patient denies chest pain and SOB. Plan of care reviewed with patient. Patient verbalizes understanding of plan. Will continue to monitor.

## 2020-07-07 NOTE — ASSESSMENT & PLAN NOTE
LDH in 640 -> 585 -> 492 -> 474 after  250 ml fluids, reduced torsemide. BNP 97. Repeat TTE. LFT normal.  Will c/w coumadin. INR 2.2 today. Coumadin 7.5 mg. Given risk of GI bleed, patient not a candidate for heparin bridge. Also given concern risk of bleeding, hold off on starting persantine. C/W ASA 81 mg daily. Likely LDH elevation as an acute phase reactant from infection.

## 2020-07-07 NOTE — TELEPHONE ENCOUNTER
----- Message from Donell Catalan RRT sent at 7/7/2020  8:05 AM CDT -----  Regarding: Admitted patient  Jordan Vallejo,    Mr Richards (MRN [id number]) is currently admitted in the hospital. He is currently schedule for his sleep study this Friday. I'm not really sure how to appropriately contact him. His sleep lab appointment will need to be re-schedule. Do you have any suggestions how to contact an in patient?

## 2020-07-07 NOTE — SUBJECTIVE & OBJECTIVE
Interval History:   -Patient evaluated at bedside.  -PICC line in place. Aware plan for extraction tentatively on Tuesday 7/14/20. In the meantime, will go home with IV abx.  -Afebrile, no leukocytosis, Hb stable.   -INR 2.2, on warfarin 5mg, Cr 1.6  -Likely LDH elevation due to acute phase reaction from infection.      Objective:     Vital Signs (Most Recent):  Temp: 97.6 °F (36.4 °C) (07/07/20 0744)  Pulse: 68 (07/07/20 0717)  Resp: 18 (07/07/20 0744)  BP: (!) 90/0 (07/07/20 0744)  SpO2: (!) 89 % (07/07/20 0744) Vital Signs (24h Range):  Temp:  [97.6 °F (36.4 °C)-99 °F (37.2 °C)] 97.6 °F (36.4 °C)  Pulse:  [62-91] 68  Resp:  [16-18] 18  SpO2:  [89 %-99 %] 89 %  BP: (86-90)/(0) 90/0     Weight: 123.7 kg (272 lb 11.3 oz)  Body mass index is 35.98 kg/m².    SpO2: (!) 89 %  O2 Device (Oxygen Therapy): room air      Intake/Output Summary (Last 24 hours) at 7/7/2020 0933  Last data filed at 7/6/2020 2200  Gross per 24 hour   Intake 240 ml   Output 2000 ml   Net -1760 ml       Lines/Drains/Airways     Peripherally Inserted Central Catheter Line            PICC Double Lumen 07/06/20 1642 right basilic less than 1 day          Line                 VAD 03/08/18 1124 Left ventricular assist device HeartMate  days          Peripheral Intravenous Line                 Peripheral IV - Single Lumen 07/01/20 1648 20 G;1 3/4 in Left;Anterior Forearm 5 days                Physical Exam    Significant Labs:   CMP   Recent Labs   Lab 07/06/20  0701 07/07/20  0545   * 137   K 4.1 4.1    103   CO2 23 27   GLU 89 79   BUN 15 13   CREATININE 1.6* 1.6*   CALCIUM 9.3 9.4   PROT 7.1  --    ALBUMIN 3.4*  --    BILITOT 0.4  --    ALKPHOS 98  --    AST 24  --    ALT 21  --    ANIONGAP 10 7*   ESTGFRAFRICA 56.0* 56.0*   EGFRNONAA 48.5* 48.5*   , CBC   Recent Labs   Lab 07/06/20  0701 07/07/20  0545   WBC 6.66 6.83   HGB 11.9* 12.4*   HCT 38.6* 40.5    211    and INR   Recent Labs   Lab 07/06/20  0701 07/07/20  0545   INR  2.2* 2.2*       Significant Imaging: Echocardiogram:   Transthoracic echo (TTE) complete (Cupid Only):   Results for orders placed or performed during the hospital encounter of 07/01/20   Echo Color Flow Doppler? Yes   Result Value Ref Range    Ascending aorta 3.34 cm    STJ 2.96 cm    IVS 1.04 0.6 - 1.1 cm    LA size 3.90 cm    Left Atrium Major Axis 6.81 cm    Left Atrium Minor Axis 6.81 cm    LVIDD 9.50 (A) 3.5 - 6.0 cm    LVIDS 9.37 (A) 2.1 - 4.0 cm    LVOT diameter 2.49 cm    PW 1.34 (A) 0.6 - 1.1 cm    MV Peak A Jorge 0.49 m/s    E wave decelartion time 112.55 msec    MV Peak E Jorge 0.78 m/s    RA Major Axis 5.76 cm    RA Width 4.15 cm    Sinus 3.16 cm    TR Max Jorge 2.09 m/s    TDI LATERAL 0.10 m/s    TDI SEPTAL 0.08 m/s    LA WIDTH 4.84 cm    MV stenosis pressure 1/2 time 32.64 ms    LV Diastolic Volume 547.08 mL    LV Systolic Volume 489.59 mL    RV S' 10.73 cm/s    LV LATERAL E/E' RATIO 7.80 m/s    LV SEPTAL E/E' RATIO 9.75 m/s    FS 1 %    LA volume 109.26 cm3    LV mass 682.26 g    Left Ventricle Relative Wall Thickness 0.28 cm    MV valve area p 1/2 method 6.74 cm2    E/A ratio 1.59     Mean e' 0.09 m/s    LVOT area 4.9 cm2    E/E' ratio 8.67 m/s    LV Systolic Volume Index 201.6 mL/m2    LV Diastolic Volume Index 225.22 mL/m2    LA Volume Index 45.0 mL/m2    LV Mass Index 281 g/m2    Triscuspid Valve Regurgitation Peak Gradient 17 mmHg    BSA 2.49 m2    Narrative    · Eccentric left ventricular hypertrophy. Severely decreased left   ventricular systolic function. The estimated ejection fraction is 10%.  · Grade I (mild) left ventricular diastolic dysfunction consistent with   impaired relaxation.  · Moderate left atrial enlargement.  · Small posterior pericardial effusion.  · Mild mitral regurgitation.  · Mild tricuspid regurgitation.  · LVAD present. Base speed is 9800 RPMs. The pump type is a Heartmate II.   The interventricular septum appears midline. LVEDD 9.5cm. Unable to   comment on AV  · RV not  well seen. Appears normal in size on limited images.     There is a suggestion of a mobile mass on a lead in the right atrium   (image 47)

## 2020-07-07 NOTE — PLAN OF CARE
Plan of care discussed with patient. Patient is free of fall/trauma/injury. Denies CP, SOB, or pain/discomfort. DP high, medications adjusted; pt reporting high BP as his normal at home. PICC dressing changed d/t bleeding, tolerated well. IV abx continued, tolerating well. Plan is for lead extraction on Friday. All questions addressed. Will continue to monitor

## 2020-07-07 NOTE — SUBJECTIVE & OBJECTIVE
Interval History:   -Patient evaluated at bedside  -PICC line in place. Understand plan is to go home with IV abx and scheduled tentatively for device extraction on Tuesday 7/14.  -Afebrile, no leukocytosis, Hb stable.   -INR 2.2, on warfarin 5mg, Cr 1.6  -Likely LDH elevation due to acute phase reaction from infection.      ROS  Objective:     Vital Signs (Most Recent):  Temp: 97.6 °F (36.4 °C) (07/07/20 0744)  Pulse: 68 (07/07/20 0717)  Resp: 18 (07/07/20 0744)  BP: (!) 90/0 (07/07/20 0744)  SpO2: (!) 89 % (07/07/20 0744) Vital Signs (24h Range):  Temp:  [97.6 °F (36.4 °C)-99 °F (37.2 °C)] 97.6 °F (36.4 °C)  Pulse:  [62-91] 68  Resp:  [16-18] 18  SpO2:  [89 %-99 %] 89 %  BP: (86-90)/(0) 90/0     Weight: 123.7 kg (272 lb 11.3 oz)  Body mass index is 35.98 kg/m².     SpO2: (!) 89 %  O2 Device (Oxygen Therapy): room air    Physical Exam   Constitutional: He is oriented to person, place, and time. He appears well-developed and well-nourished. No distress.   HENT:   Head: Normocephalic.   Left Ear: External ear normal.   Eyes: Pupils are equal, round, and reactive to light. Right eye exhibits no discharge. Left eye exhibits no discharge.   Neck: Normal range of motion. No thyromegaly present.   Cardiovascular: Normal rate and regular rhythm. Exam reveals no friction rub.   Murmur heard.  Smooth VAD hum   Pulmonary/Chest: Effort normal and breath sounds normal. No respiratory distress.   Abdominal: Soft. Bowel sounds are normal. He exhibits no distension. There is no abdominal tenderness.   Musculoskeletal: Normal range of motion.         General: No edema.   Neurological: He is alert and oriented to person, place, and time.   Skin: Skin is warm and dry. No rash noted. No erythema.            Significant Labs:   Blood Culture: No results for input(s): LABBLOO in the last 48 hours., CMP:   Recent Labs   Lab 07/06/20  0701 07/07/20  0545   * 137   K 4.1 4.1    103   CO2 23 27   GLU 89 79   BUN 15 13    CREATININE 1.6* 1.6*   CALCIUM 9.3 9.4   PROT 7.1  --    ALBUMIN 3.4*  --    BILITOT 0.4  --    ALKPHOS 98  --    AST 24  --    ALT 21  --    ANIONGAP 10 7*   ESTGFRAFRICA 56.0* 56.0*   EGFRNONAA 48.5* 48.5*   , CBC:   Recent Labs   Lab 07/06/20  0701 07/07/20  0545   WBC 6.66 6.83   HGB 11.9* 12.4*   HCT 38.6* 40.5    211    and INR:   Recent Labs   Lab 07/06/20  0701 07/07/20  0545   INR 2.2* 2.2*       Significant Imaging: Echocardiogram:   Transthoracic echo (TTE) complete (Cupid Only):   Results for orders placed or performed during the hospital encounter of 07/01/20   Echo Color Flow Doppler? Yes   Result Value Ref Range    Ascending aorta 3.34 cm    STJ 2.96 cm    IVS 1.04 0.6 - 1.1 cm    LA size 3.90 cm    Left Atrium Major Axis 6.81 cm    Left Atrium Minor Axis 6.81 cm    LVIDD 9.50 (A) 3.5 - 6.0 cm    LVIDS 9.37 (A) 2.1 - 4.0 cm    LVOT diameter 2.49 cm    PW 1.34 (A) 0.6 - 1.1 cm    MV Peak A Jorge 0.49 m/s    E wave decelartion time 112.55 msec    MV Peak E Jorge 0.78 m/s    RA Major Axis 5.76 cm    RA Width 4.15 cm    Sinus 3.16 cm    TR Max Jorge 2.09 m/s    TDI LATERAL 0.10 m/s    TDI SEPTAL 0.08 m/s    LA WIDTH 4.84 cm    MV stenosis pressure 1/2 time 32.64 ms    LV Diastolic Volume 547.08 mL    LV Systolic Volume 489.59 mL    RV S' 10.73 cm/s    LV LATERAL E/E' RATIO 7.80 m/s    LV SEPTAL E/E' RATIO 9.75 m/s    FS 1 %    LA volume 109.26 cm3    LV mass 682.26 g    Left Ventricle Relative Wall Thickness 0.28 cm    MV valve area p 1/2 method 6.74 cm2    E/A ratio 1.59     Mean e' 0.09 m/s    LVOT area 4.9 cm2    E/E' ratio 8.67 m/s    LV Systolic Volume Index 201.6 mL/m2    LV Diastolic Volume Index 225.22 mL/m2    LA Volume Index 45.0 mL/m2    LV Mass Index 281 g/m2    Triscuspid Valve Regurgitation Peak Gradient 17 mmHg    BSA 2.49 m2    Narrative    · Eccentric left ventricular hypertrophy. Severely decreased left   ventricular systolic function. The estimated ejection fraction is 10%.  · Grade  I (mild) left ventricular diastolic dysfunction consistent with   impaired relaxation.  · Moderate left atrial enlargement.  · Small posterior pericardial effusion.  · Mild mitral regurgitation.  · Mild tricuspid regurgitation.  · LVAD present. Base speed is 9800 RPMs. The pump type is a Heartmate II.   The interventricular septum appears midline. LVEDD 9.5cm. Unable to   comment on AV  · RV not well seen. Appears normal in size on limited images.     There is a suggestion of a mobile mass on a lead in the right atrium   (image 47)

## 2020-07-07 NOTE — ASSESSMENT & PLAN NOTE
BiV-Icd Med[employer]onic (2014 Dr. Chiu), successful DFT at 35J on 10/18/2019. Multiple VT episodes,  generator change (BiV ICD -> dual ICD),  device revision (addition of a new RV/ICD lead) and another VF induction via ICD on 3/2020. Presented from device clinic with device erosion.     - Echo done 7/5 Eccentric left ventricular hypertrophy. Severely decreased left ventricular systolic function. EF 10%. LVAD present. Base speed is 9800 RPMs. Evidence suggesting mobile mass on RA lead  - Blood cultured negative to date  - Wound cultures growing Enterobacter.  - Antibiotic regimen per ID recommendations. Switched to Cefepime q8h.  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   - CT surgery consulted for evaluation and back up plan recommendations regarding device extraction. Given prior surgical history no surgical back up will be present at lead removal.  - Device extraction tentatively scheduled for Tues 7/14/20 with Dr. Yun  -  planning on discharge later today

## 2020-07-07 NOTE — PROGRESS NOTES
UPDATE    SW to Pts room for ongoing support & assessment of needs. Pt presents as AAO x4, calm, cooperative, and asking and answering questions appropriately. Pt reports understanding of change to POC - Pt will remain in hospital and will have ICD removed in the next few days. Pt reports having been visited by Kerri with [facility] and understands he will remain on ABX for a few weeks upon d/c. Pt denies further needs or concerns at this time & welcomes continued SW support.     MARIA DEL CARMEN spoke to Kerri with [facility] ([phone]  f: [phone]) to advise of change in d/c date & evolving POC. Kerri denies needs at this time. Spoke with Emma from [facility] ([phone]) to confirm change in d/c plans as well. Emma will tentatively place Pt on admit list for Saturday.     SW providing ongoing psychosocial, counseling, & emotional support, education, resources, assistance, and discharge planning as indicated.  SW to continue to follow.

## 2020-07-07 NOTE — PROGRESS NOTES
[facility]  Heart Transplant  Progress Note    Patient Name: Tim Richards  MRN: [id number]  Admission Date: 7/1/2020  Hospital Length of Stay: 6 days  Attending Physician: Amparo Lopez MD  Primary Care Provider: Power Connors MD  Principal Problem:ICD (implantable cardioverter-defibrillator) infection    Subjective:     Interval History: Patient seen and examined today at bedside. Wound c/s positive for Enterobacter cloacae from 7/2. Blood c/s from 7/2 and 7/3 negative.  ID f/u appreciated- DC Vancomycin, changed to IV cefepime, continue 2 weeks post device extraction. PICC on 7/6/2020.  EP - plan for ICD removal on Friday, CTS consulted for input and back up plan. EP follow up appreciated. Life vest on discharge.  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   LDH in 640 -> 585 -> 492 -> 474- Received 250 ml fluids on 7/4. Repeat TTE at 9800 with LV 9.5 cm, suggestion of a mobile mass on a lead in the atrium. LFT normal.  Will c/w coumadin. INR 2.2 today. Increase guafacine to 2 mg qhs.  Gout- s/w colchicine improved.      Continuous Infusions:  Scheduled Meds:   allopurinoL  100 mg Oral Daily    amiodarone  200 mg Oral Daily    amLODIPine  10 mg Oral Daily    aspirin  81 mg Oral Daily    ceFEPime (MAXIPIME) IVPB  1 g Intravenous Q8H    colchicine  0.6 mg Oral Daily    doxazosin  8 mg Oral Q12H    ferrous gluconate  324 mg Oral Daily with lunch    fluticasone propionate  2 spray Each Nostril Daily    guanFACINE  2 mg Oral QHS    pantoprazole  40 mg Oral Daily with lunch    paroxetine  10 mg Oral QAM    potassium chloride SA  20 mEq Oral BID    predniSONE  2.5 mg Oral Daily    sodium chloride 0.9%  10 mL Intravenous Q6H    spironolactone  25 mg Oral Daily with lunch    torsemide  20 mg Oral Daily    vitamin D  1,000 Units Oral Daily    warfarin  5 mg Oral Daily     PRN Meds:acetaminophen,  polyethylene glycol, senna-docusate 8.6-50 mg, Flushing PICC Protocol **AND** sodium chloride 0.9% **AND** sodium chloride 0.9%, zolpidem    Review of patient's allergies indicates:   Allergen Reactions    Lisinopril Anaphylaxis    Hydralazine analogues      Chronic constipation, impotence, dizziness     Objective:     Vital Signs (Most Recent):  Temp: 98 °F (36.7 °C) (07/07/20 1132)  Pulse: 72 (07/07/20 1132)  Resp: 18 (07/07/20 1132)  BP: (!) 86/0 (07/07/20 1132)  SpO2: (!) 93 % (07/07/20 1132) Vital Signs (24h Range):  Temp:  [97.6 °F (36.4 °C)-99 °F (37.2 °C)] 98 °F (36.7 °C)  Pulse:  [62-91] 72  Resp:  [18] 18  SpO2:  [93 %-99 %] 93 %  BP: (86-90)/(0) 86/0     Patient Vitals for the past 72 hrs (Last 3 readings):   Weight   07/06/20 0900 123.7 kg (272 lb 11.3 oz)   07/05/20 1357 120.7 kg (266 lb)   07/05/20 0800 121.1 kg (266 lb 15.6 oz)     Body mass index is 35.98 kg/m².      Intake/Output Summary (Last 24 hours) at 7/7/2020 1353  Last data filed at 7/6/2020 2200  Gross per 24 hour   Intake 240 ml   Output 2000 ml   Net -1760 ml       Hemodynamic Parameters:       Telemetry: VAD artifact    Physical Exam  Vitals signs and nursing note reviewed.   Constitutional:       Appearance: Normal appearance.   HENT:      Head: Normocephalic and atraumatic.      Nose: No congestion.      Mouth/Throat:      Mouth: Mucous membranes are moist.   Eyes:      Extraocular Movements: Extraocular movements intact.      Pupils: Pupils are equal, round, and reactive to light.   Neck:      Musculoskeletal: Normal range of motion.   Cardiovascular:      Rate and Rhythm: Normal rate and regular rhythm.      Comments: Smooth VAD hum  Pulmonary:      Effort: Pulmonary effort is normal. No respiratory distress.      Breath sounds: Normal breath sounds. No rales.   Abdominal:      General: Abdomen is flat. Bowel sounds are normal. There is no distension.      Tenderness: There is no abdominal tenderness. There is no guarding.    Musculoskeletal:         General: No swelling.   Skin:     General: Skin is warm.      Comments: ICD pocket site- redness, tenderness.    Neurological:      General: No focal deficit present.      Mental Status: He is alert.   Psychiatric:         Mood and Affect: Mood normal.         Significant Labs:  CBC:  Recent Labs   Lab 07/05/20 0416 07/06/20 0701 07/07/20  0545   WBC 6.81 6.66 6.83   RBC 4.59* 4.32* 4.64   HGB 12.6* 11.9* 12.4*   HCT 39.6* 38.6* 40.5    207 211   MCV 86 89 87   MCH 27.5 27.5 26.7*   MCHC 31.8* 30.8* 30.6*     BNP:  Recent Labs   Lab 07/03/20  0709 07/06/20  0702   BNP 97 124*     CMP:  Recent Labs   Lab 07/02/20  0354  07/03/20 0709  07/05/20 0415 07/06/20 0701 07/07/20  0545   GLU 81   < >  --    < > 68* 89 79   CALCIUM 9.3   < >  --    < > 9.6 9.3 9.4   ALBUMIN 3.5  --  3.5  --   --  3.4*  --    PROT 7.1  --  7.2  --   --  7.1  --       < >  --    < > 137 135* 137   K 3.5   < >  --    < > 4.0 4.1 4.1   CO2 25   < >  --    < > 26 23 27      < >  --    < > 102 102 103   BUN 13   < >  --    < > 13 15 13   CREATININE 1.9*   < >  --    < > 1.7* 1.6* 1.6*   ALKPHOS 86  --  94  --   --  98  --    ALT 17  --  19  --   --  21  --    AST 31  --  27  --   --  24  --    BILITOT 0.6  --  0.4  --   --  0.4  --     < > = values in this interval not displayed.      Coagulation:   Recent Labs   Lab 07/05/20 0415 07/06/20 0701 07/07/20 0545   INR 1.8* 2.2* 2.2*   APTT 35.4* 34.3* 34.9*     LDH:  Recent Labs   Lab 07/05/20 0415 07/06/20  0701 07/07/20  0545   * 492* 474*     Microbiology:  Microbiology Results (last 7 days)     Procedure Component Value Units Date/Time    Culture, Anaerobe [[id number]]  (Abnormal) Collected: 07/01/20 2153    Order Status: Completed Specimen: Wound from Abdomen Updated: 07/07/20 1316     Anaerobic Culture FUSOBACTERIUM NUCLEATUM  Moderate        PREVOTELLA (B.) MELANINOGENICA  Moderate      Narrative:      Left chest incision.    Culture,  Anaerobe [[id number]] Collected: 07/01/20 1741    Order Status: Completed Specimen: Incision site from Chest, Left Updated: 07/07/20 1314     Anaerobic Culture Culture in progress    Blood culture [[id number]] Collected: 07/03/20 0712    Order Status: Completed Specimen: Blood Updated: 07/07/20 0812     Blood Culture, Routine No Growth to date      No Growth to date      No Growth to date      No Growth to date      No Growth to date    Blood culture [[id number]] Collected: 07/03/20 0711    Order Status: Completed Specimen: Blood Updated: 07/07/20 0812     Blood Culture, Routine No Growth to date      No Growth to date      No Growth to date      No Growth to date      No Growth to date    Blood culture [[id number]] Collected: 07/02/20 1110    Order Status: Completed Specimen: Blood Updated: 07/06/20 1412     Blood Culture, Routine No Growth to date      No Growth to date      No Growth to date      No Growth to date      No Growth to date    Culture, Anaerobe [[id number]] Collected: 07/01/20 1741    Order Status: Completed Specimen: Skin from Abdomen Updated: 07/06/20 1022     Anaerobic Culture Culture in progress    Narrative:      Drive line site    Aerobic culture [[id number]]  (Abnormal)  (Susceptibility) Collected: 07/01/20 2153    Order Status: Completed Specimen: Wound from Abdomen Updated: 07/04/20 1214     Aerobic Bacterial Culture ENTEROBACTER CLOACAE  Moderate  No other significant isolate      Narrative:      Left chest incision    Aerobic culture [[id number]] Collected: 07/01/20 1741    Order Status: Completed Specimen: Skin from Abdomen Updated: 07/03/20 1203     Aerobic Bacterial Culture No significant isolate    Culture, Anaerobe [[id number]]     Order Status: Canceled Specimen: Skin from Abdomen     Aerobic culture [[id number]]     Order Status: Canceled Specimen: Skin from Abdomen     Aerobic culture [[id number]] Collected: 07/01/20 1741    Order Status: Canceled Specimen: Incision site from Chest, Left            BMP:   Recent Labs   Lab 07/07/20  0545   GLU 79      K 4.1      CO2 27   BUN 13   CREATININE 1.6*   CALCIUM 9.4   MG 2.2     Cardiac Markers: No results for input(s): CKMB, TROPONINT, MYOGLOBIN in the last 72 hours.  Coagulation:   Recent Labs   Lab 07/07/20  0545   INR 2.2*   APTT 34.9*     I have reviewed all pertinent labs within the past 24 hours.    Estimated Creatinine Clearance: 73.6 mL/min (A) (based on SCr of 1.6 mg/dL (H)).    Diagnostic Results:  I have reviewed all pertinent imaging results/findings within the past 24 hours.    Assessment and Plan:     53 yo AAM with DCM, HBP, CHF stage D s/p HM 2, ICD lead replacement 03/09/2020, hyperthyroidism,  sent in from clinic with a ICD pocket site infection. Patient wife reports yellow pus oozing from site this past week. Was prescribed antibiotics on Monday 6/29/20, and advised to come get admitted that day, but patient reported he didn't think it was necessary at that time. Denies fever, but reports intermittent nausea and chills, and Left side of Left hand tingling.      VAD parameters at baseline.  Pt speed decreased to 9800 rpms from 10,000 on 06/12/2020    ICD device check - 07/01/2020 --. Device interrogation revealed HV x 1 on 6/27/20 @ 3:41 pm for MMVT, Type 2 break.,CL-188 bpm. Patient reported sitting in car bending over when it occurred, not doing anything strenuous. Patient denies LOC. Patient reports overall feeling palpitations, tired, no energy, and COLEMAN, even prior to HV therapy.          * ICD (implantable cardioverter-defibrillator) infection  - presented with device pocket infection  - has been on oral doxycycline at home for the last 1 week with no improvement  - denies any fever but does admit to fatigue and chills   - labs pending   - ICD device check - 07/01/2020 --. Device interrogation revealed HV x 1 on 6/27/20 @ 3:41 pm for MMVT, Type 2 break.,CL-188 bpm.    Plan   - ID consulted who recommended IV vancomycin and IV  cefepime. On 7/4 DC Vanco, c/w cefepime. Wound c/s positive for Enterobacter cloacae. Blood c/s from 7/2 and 7/3 negative.  ID f/u appreciated- DC Vancomycin, changed to IV cefepime, continue 2 weeks post device extraction. Will need PICC on DC.  EP - plan for ICD removal, CTS consulted for input and back up plan. EP follow up appreciated.  - will f/u blood and site cultures and based on the sensitivities will narrow the antibitoics  - EP informed who will plan an outpatient generator change and pocket site debridement.  - patient will stay over the weekend o get IV antibiotics and get the final results of his culture.       Gout  - Uric acid  - S/W colchicine and allopurinol continued    Elevated LDH  LDH in 640 -> 585 -> 492 -> 474 after  250 ml fluids, reduced torsemide. BNP 97. Repeat TTE. LFT normal.  Will c/w coumadin. INR 2.2 today. Coumadin 7.5 mg. Given risk of GI bleed, patient not a candidate for heparin bridge. Also given concern risk of bleeding, hold off on starting persantine. C/W ASA 81 mg daily. Likely LDH elevation as an acute phase reactant from infection.         VT (ventricular tachycardia)  - device check showed a VT episode with shock on 06/27  - patient denies any LOC   - c/w amiodarone 200 mg daily.   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 . LFT normal.  Will c/w coumadin. INR 2.2. Coumadin 7.5 mg. Given risk of GI bleed, patient not a candidate for heparin bridge. Also given concern risk of bleeding, hold off on starting persantine. C/W ASA 81 mg daily. Likely LDH elevation as an acute phase reactant from infection.   - INR 2.2 Goal of 2-3   - c/w coumadin  - patient denies any dark urine and no obvious scleral icterus on exam  - drive site clean and dry but will send site cultures.    Procedure: Device Interrogation Including  analysis of device parameters  Current Settings: Ventricular Assist Device  Review of device function is stable/unstable stable    TXP LVAD INTERROGATIONS 7/7/2020 7/7/2020 7/6/2020 7/6/2020 7/6/2020 7/6/2020 7/6/2020   Type HeartMate II HeartMate II HeartMate II HeartMate II HeartMate II HeartMate II HeartMate II   Flow 5.1 5.1 3.3 5.0 5.1 4.8 4.9   Speed 9800 9800 9800 9800 9800 9800 9800   PI 3.0 3.9 5.0 4.8 4.9 4.3 4.3   Power (Jackson) 6.3 6.2 6.1 6.2 6.3 6.1 6.0   LSL 9400 9400 9400 - - 9400 9400   Pulsatility No Pulse No Pulse No Pulse - - No Pulse No Pulse       NICM (nonischemic cardiomyopathy)  - 7/4 bolus fluids. Reduce Torsemide to 20 mg daily.   - keep K > 4 and Mg >2      Seen and d/w Dr. John Hopkins MD  Heart Transplant  [facility]

## 2020-07-07 NOTE — TELEPHONE ENCOUNTER
Spoke to wife. Pt has an infected ICD and wife states does not think pt is strong enough to come in for overnight sleep study. They will contact us to reschedule after he recovers from his surgery Tuesday. Consider home sleep study.

## 2020-07-08 ENCOUNTER — DOCUMENTATION ONLY (OUTPATIENT)
Dept: TRANSPLANT | Facility: CLINIC | Age: 55
End: 2020-07-08

## 2020-07-08 LAB
ALBUMIN SERPL BCP-MCNC: 3.5 G/DL (ref 3.5–5.2)
ALP SERPL-CCNC: 94 U/L (ref 55–135)
ALT SERPL W/O P-5'-P-CCNC: 23 U/L (ref 10–44)
ANION GAP SERPL CALC-SCNC: 10 MMOL/L (ref 8–16)
APTT BLDCRRT: 34.8 SEC (ref 21–32)
AST SERPL-CCNC: 30 U/L (ref 10–40)
BACTERIA BLD CULT: NORMAL
BACTERIA BLD CULT: NORMAL
BASOPHILS # BLD AUTO: 0.02 K/UL (ref 0–0.2)
BASOPHILS NFR BLD: 0.3 % (ref 0–1.9)
BILIRUB DIRECT SERPL-MCNC: 0.2 MG/DL (ref 0.1–0.3)
BILIRUB SERPL-MCNC: 0.5 MG/DL (ref 0.1–1)
BNP SERPL-MCNC: 74 PG/ML (ref 0–99)
BUN SERPL-MCNC: 14 MG/DL (ref 6–20)
CALCIUM SERPL-MCNC: 9.3 MG/DL (ref 8.7–10.5)
CHLORIDE SERPL-SCNC: 102 MMOL/L (ref 95–110)
CO2 SERPL-SCNC: 24 MMOL/L (ref 23–29)
CREAT SERPL-MCNC: 1.7 MG/DL (ref 0.5–1.4)
CRP SERPL-MCNC: 43 MG/L (ref 0–8.2)
DIFFERENTIAL METHOD: ABNORMAL
EOSINOPHIL # BLD AUTO: 0.2 K/UL (ref 0–0.5)
EOSINOPHIL NFR BLD: 2.9 % (ref 0–8)
ERYTHROCYTE [DISTWIDTH] IN BLOOD BY AUTOMATED COUNT: 15.2 % (ref 11.5–14.5)
EST. GFR  (AFRICAN AMERICAN): 52.1 ML/MIN/1.73 M^2
EST. GFR  (NON AFRICAN AMERICAN): 45 ML/MIN/1.73 M^2
GLUCOSE SERPL-MCNC: 84 MG/DL (ref 70–110)
HCT VFR BLD AUTO: 39.6 % (ref 40–54)
HGB BLD-MCNC: 12.2 G/DL (ref 14–18)
IMM GRANULOCYTES # BLD AUTO: 0.03 K/UL (ref 0–0.04)
IMM GRANULOCYTES NFR BLD AUTO: 0.5 % (ref 0–0.5)
INR PPP: 2.1 (ref 0.8–1.2)
LDH SERPL L TO P-CCNC: 445 U/L (ref 110–260)
LYMPHOCYTES # BLD AUTO: 1 K/UL (ref 1–4.8)
LYMPHOCYTES NFR BLD: 17.3 % (ref 18–48)
MAGNESIUM SERPL-MCNC: 2.2 MG/DL (ref 1.6–2.6)
MCH RBC QN AUTO: 27.2 PG (ref 27–31)
MCHC RBC AUTO-ENTMCNC: 30.8 G/DL (ref 32–36)
MCV RBC AUTO: 88 FL (ref 82–98)
MONOCYTES # BLD AUTO: 0.8 K/UL (ref 0.3–1)
MONOCYTES NFR BLD: 13.9 % (ref 4–15)
NEUTROPHILS # BLD AUTO: 3.8 K/UL (ref 1.8–7.7)
NEUTROPHILS NFR BLD: 65.1 % (ref 38–73)
NRBC BLD-RTO: 0 /100 WBC
PHOSPHATE SERPL-MCNC: 3.4 MG/DL (ref 2.7–4.5)
PLATELET # BLD AUTO: 219 K/UL (ref 150–350)
PMV BLD AUTO: 10.5 FL (ref 9.2–12.9)
POTASSIUM SERPL-SCNC: 3.7 MMOL/L (ref 3.5–5.1)
PREALB SERPL-MCNC: 20 MG/DL (ref 20–43)
PROT SERPL-MCNC: 7.4 G/DL (ref 6–8.4)
PROTHROMBIN TIME: 19.9 SEC (ref 9–12.5)
RBC # BLD AUTO: 4.48 M/UL (ref 4.6–6.2)
SODIUM SERPL-SCNC: 136 MMOL/L (ref 136–145)
WBC # BLD AUTO: 5.89 K/UL (ref 3.9–12.7)

## 2020-07-08 PROCEDURE — 80076 HEPATIC FUNCTION PANEL: CPT

## 2020-07-08 PROCEDURE — 94761 N-INVAS EAR/PLS OXIMETRY MLT: CPT

## 2020-07-08 PROCEDURE — 36415 COLL VENOUS BLD VENIPUNCTURE: CPT

## 2020-07-08 PROCEDURE — 83880 ASSAY OF NATRIURETIC PEPTIDE: CPT

## 2020-07-08 PROCEDURE — 99233 PR SUBSEQUENT HOSPITAL CARE,LEVL III: ICD-10-PCS | Mod: ,,, | Performed by: INTERNAL MEDICINE

## 2020-07-08 PROCEDURE — 99233 SBSQ HOSP IP/OBS HIGH 50: CPT | Mod: ,,, | Performed by: INTERNAL MEDICINE

## 2020-07-08 PROCEDURE — 85730 THROMBOPLASTIN TIME PARTIAL: CPT

## 2020-07-08 PROCEDURE — 97803 MED NUTRITION INDIV SUBSEQ: CPT

## 2020-07-08 PROCEDURE — 63600175 PHARM REV CODE 636 W HCPCS: Performed by: STUDENT IN AN ORGANIZED HEALTH CARE EDUCATION/TRAINING PROGRAM

## 2020-07-08 PROCEDURE — 86140 C-REACTIVE PROTEIN: CPT

## 2020-07-08 PROCEDURE — 85610 PROTHROMBIN TIME: CPT

## 2020-07-08 PROCEDURE — 20600001 HC STEP DOWN PRIVATE ROOM

## 2020-07-08 PROCEDURE — 63600175 PHARM REV CODE 636 W HCPCS: Performed by: INTERNAL MEDICINE

## 2020-07-08 PROCEDURE — 83615 LACTATE (LD) (LDH) ENZYME: CPT

## 2020-07-08 PROCEDURE — 80048 BASIC METABOLIC PNL TOTAL CA: CPT

## 2020-07-08 PROCEDURE — 84134 ASSAY OF PREALBUMIN: CPT

## 2020-07-08 PROCEDURE — 84100 ASSAY OF PHOSPHORUS: CPT

## 2020-07-08 PROCEDURE — 25000003 PHARM REV CODE 250: Performed by: INTERNAL MEDICINE

## 2020-07-08 PROCEDURE — 85025 COMPLETE CBC W/AUTO DIFF WBC: CPT

## 2020-07-08 PROCEDURE — 27000248 HC VAD-ADDITIONAL DAY

## 2020-07-08 PROCEDURE — A4216 STERILE WATER/SALINE, 10 ML: HCPCS | Performed by: INTERNAL MEDICINE

## 2020-07-08 PROCEDURE — 25000003 PHARM REV CODE 250: Performed by: STUDENT IN AN ORGANIZED HEALTH CARE EDUCATION/TRAINING PROGRAM

## 2020-07-08 PROCEDURE — 93750 PR INTERROGATE VENT ASSIST DEV, IN PERSON, W PHYSICIAN ANALYSIS: ICD-10-PCS | Mod: ,,, | Performed by: INTERNAL MEDICINE

## 2020-07-08 PROCEDURE — 93750 INTERROGATION VAD IN PERSON: CPT | Mod: ,,, | Performed by: INTERNAL MEDICINE

## 2020-07-08 PROCEDURE — 25000003 PHARM REV CODE 250

## 2020-07-08 PROCEDURE — 83735 ASSAY OF MAGNESIUM: CPT

## 2020-07-08 RX ORDER — POTASSIUM CHLORIDE 20 MEQ/1
40 TABLET, EXTENDED RELEASE ORAL ONCE
Status: COMPLETED | OUTPATIENT
Start: 2020-07-08 | End: 2020-07-08

## 2020-07-08 RX ADMIN — Medication 10 ML: at 06:07

## 2020-07-08 RX ADMIN — COLCHICINE 0.6 MG: 0.6 TABLET, FILM COATED ORAL at 09:07

## 2020-07-08 RX ADMIN — FERROUS GLUCONATE TAB 324 MG (37.5 MG ELEMENTAL IRON) 324 MG: 324 (37.5 FE) TAB at 01:07

## 2020-07-08 RX ADMIN — POTASSIUM CHLORIDE 20 MEQ: 1500 TABLET, EXTENDED RELEASE ORAL at 09:07

## 2020-07-08 RX ADMIN — TORSEMIDE 20 MG: 20 TABLET ORAL at 09:07

## 2020-07-08 RX ADMIN — MELATONIN 1000 UNITS: at 09:07

## 2020-07-08 RX ADMIN — DOXAZOSIN 8 MG: 8 TABLET ORAL at 09:07

## 2020-07-08 RX ADMIN — ASPIRIN 81 MG: 81 TABLET, COATED ORAL at 09:07

## 2020-07-08 RX ADMIN — PREDNISONE 2.5 MG: 2.5 TABLET ORAL at 09:07

## 2020-07-08 RX ADMIN — PANTOPRAZOLE SODIUM 40 MG: 40 TABLET, DELAYED RELEASE ORAL at 01:07

## 2020-07-08 RX ADMIN — AMLODIPINE BESYLATE 10 MG: 10 TABLET ORAL at 09:07

## 2020-07-08 RX ADMIN — GUANFACINE 2 MG: 1 TABLET ORAL at 09:07

## 2020-07-08 RX ADMIN — AMIODARONE HYDROCHLORIDE 200 MG: 200 TABLET ORAL at 09:07

## 2020-07-08 RX ADMIN — PAROXETINE HYDROCHLORIDE 10 MG: 10 TABLET, FILM COATED ORAL at 05:07

## 2020-07-08 RX ADMIN — CEFEPIME 1 G: 1 INJECTION, POWDER, FOR SOLUTION INTRAMUSCULAR; INTRAVENOUS at 04:07

## 2020-07-08 RX ADMIN — CEFEPIME 1 G: 1 INJECTION, POWDER, FOR SOLUTION INTRAMUSCULAR; INTRAVENOUS at 05:07

## 2020-07-08 RX ADMIN — ZOLPIDEM TARTRATE 10 MG: 5 TABLET ORAL at 09:07

## 2020-07-08 RX ADMIN — Medication 10 ML: at 12:07

## 2020-07-08 RX ADMIN — WARFARIN SODIUM 5 MG: 5 TABLET ORAL at 04:07

## 2020-07-08 RX ADMIN — ALLOPURINOL 100 MG: 100 TABLET ORAL at 09:07

## 2020-07-08 RX ADMIN — CEFEPIME 1 G: 1 INJECTION, POWDER, FOR SOLUTION INTRAMUSCULAR; INTRAVENOUS at 11:07

## 2020-07-08 RX ADMIN — POTASSIUM CHLORIDE 40 MEQ: 1500 TABLET, EXTENDED RELEASE ORAL at 07:07

## 2020-07-08 RX ADMIN — SPIRONOLACTONE 25 MG: 25 TABLET ORAL at 01:07

## 2020-07-08 NOTE — PROGRESS NOTES
[facility]  Heart Transplant  Progress Note    Patient Name: Tim Richards  MRN: [id number]  Admission Date: 7/1/2020  Hospital Length of Stay: 7 days  Attending Physician: Amparo Lopez MD  Primary Care Provider: Power Connors MD  Principal Problem:ICD (implantable cardioverter-defibrillator) infection    Subjective:     Interval History: Patient seen and examined today at bedside. Wound c/s positive for Enterobacter cloacae from 7/2. Blood c/s from 7/2 and 7/3 negative.  ID f/u appreciated- DC Vancomycin, changed to IV cefepime, continue 2 weeks post device extraction. PICC on 7/6/2020.  EP - plan for ICD removal on Friday, CTS consulted for input and back up plan. EP follow up appreciated. Life vest on discharge.  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   LDH in 640 -> 585 -> 492 -> 474- Received 250 ml fluids on 7/4. Repeat TTE at 9800 with LV 9.5 cm, suggestion of a mobile mass on a lead in the atrium. LFT normal.  Will c/w coumadin. INR 2.2 today. Increase guafacine to 2 mg qhs.  Pt reluctant to have speed increase as he had symptoms in the past with speed change, no active symptoms. Will hold speed change for now.  Also he prefers not to uptitrate his BP medications as he is anxious and would like not to increase medications at this time. Informed risks of uncontrolled BP, pt aware of risks.   Gout- s/w colchicine improved.      Continuous Infusions:  Scheduled Meds:   allopurinoL  100 mg Oral Daily    amiodarone  200 mg Oral Daily    amLODIPine  10 mg Oral Daily    aspirin  81 mg Oral Daily    ceFEPime (MAXIPIME) IVPB  1 g Intravenous Q8H    colchicine  0.6 mg Oral Daily    doxazosin  8 mg Oral Q12H    ferrous gluconate  324 mg Oral Daily with lunch    fluticasone propionate  2 spray Each Nostril Daily    guanFACINE  2 mg Oral QHS    pantoprazole  40 mg Oral Daily with lunch    paroxetine  10 mg  Oral QAM    potassium chloride SA  20 mEq Oral BID    predniSONE  2.5 mg Oral Daily    sodium chloride 0.9%  10 mL Intravenous Q6H    spironolactone  25 mg Oral Daily with lunch    torsemide  20 mg Oral Daily    vitamin D  1,000 Units Oral Daily    warfarin  5 mg Oral Daily     PRN Meds:acetaminophen, polyethylene glycol, senna-docusate 8.6-50 mg, Flushing PICC Protocol **AND** sodium chloride 0.9% **AND** sodium chloride 0.9%, zolpidem    Review of patient's allergies indicates:   Allergen Reactions    Lisinopril Anaphylaxis    Hydralazine analogues      Chronic constipation, impotence, dizziness     Objective:     Vital Signs (Most Recent):  Temp: 97.7 °F (36.5 °C) (07/08/20 1137)  Pulse: 69 (07/08/20 1106)  Resp: 17 (07/08/20 1137)  BP: (!) 94/0 (07/08/20 0704)  SpO2: 97 % (07/08/20 1137) Vital Signs (24h Range):  Temp:  [97.7 °F (36.5 °C)-98 °F (36.7 °C)] 97.7 °F (36.5 °C)  Pulse:  [67-78] 69  Resp:  [16-18] 17  SpO2:  [94 %-98 %] 97 %  BP: ()/(0-75) 94/0     Patient Vitals for the past 72 hrs (Last 3 readings):   Weight   07/08/20 0800 121.6 kg (268 lb 1.3 oz)   07/06/20 0900 123.7 kg (272 lb 11.3 oz)   07/05/20 1357 120.7 kg (266 lb)     Body mass index is 35.37 kg/m².      Intake/Output Summary (Last 24 hours) at 7/8/2020 1141  Last data filed at 7/8/2020 1100  Gross per 24 hour   Intake 240 ml   Output 2025 ml   Net -1785 ml       Hemodynamic Parameters:       Telemetry: VAD artifact/ PVC    Physical Exam  Vitals signs and nursing note reviewed.   Constitutional:       Appearance: Normal appearance.   HENT:      Head: Normocephalic and atraumatic.      Nose: No congestion.      Mouth/Throat:      Mouth: Mucous membranes are moist.   Eyes:      Extraocular Movements: Extraocular movements intact.      Pupils: Pupils are equal, round, and reactive to light.   Neck:      Musculoskeletal: Normal range of motion.   Cardiovascular:      Rate and Rhythm: Normal rate and regular rhythm.      Comments:  Smooth VAD hum  Pulmonary:      Effort: Pulmonary effort is normal. No respiratory distress.      Breath sounds: Normal breath sounds. No rales.   Abdominal:      General: Abdomen is flat. Bowel sounds are normal. There is no distension.      Tenderness: There is no abdominal tenderness. There is no guarding.   Musculoskeletal:         General: No swelling.   Skin:     General: Skin is warm.      Comments: Tenderness and redness around the ICD site.   Neurological:      General: No focal deficit present.      Mental Status: He is alert.   Psychiatric:         Mood and Affect: Mood normal.         Significant Labs:  CBC:  Recent Labs   Lab 07/06/20  0701 07/07/20  0545 07/08/20  0511   WBC 6.66 6.83 5.89   RBC 4.32* 4.64 4.48*   HGB 11.9* 12.4* 12.2*   HCT 38.6* 40.5 39.6*    211 219   MCV 89 87 88   MCH 27.5 26.7* 27.2   MCHC 30.8* 30.6* 30.8*     BNP:  Recent Labs   Lab 07/03/20  0709 07/06/20  0702 07/08/20  0511   BNP 97 124* 74     CMP:  Recent Labs   Lab 07/03/20  0709  07/06/20  0701 07/07/20  0545 07/08/20  0511   GLU  --    < > 89 79 84   CALCIUM  --    < > 9.3 9.4 9.3   ALBUMIN 3.5  --  3.4*  --  3.5   PROT 7.2  --  7.1  --  7.4   NA  --    < > 135* 137 136   K  --    < > 4.1 4.1 3.7   CO2  --    < > 23 27 24   CL  --    < > 102 103 102   BUN  --    < > 15 13 14   CREATININE  --    < > 1.6* 1.6* 1.7*   ALKPHOS 94  --  98  --  94   ALT 19  --  21  --  23   AST 27  --  24  --  30   BILITOT 0.4  --  0.4  --  0.5    < > = values in this interval not displayed.      Coagulation:   Recent Labs   Lab 07/06/20  0701 07/07/20 0545 07/08/20  0511   INR 2.2* 2.2* 2.1*   APTT 34.3* 34.9* 34.8*     LDH:  Recent Labs   Lab 07/06/20  0701 07/07/20  0545 07/08/20  0511   * 474* 445*     Microbiology:  Microbiology Results (last 7 days)     Procedure Component Value Units Date/Time    Blood culture [[id number]] Collected: 07/03/20 0712    Order Status: Completed Specimen: Blood Updated: 07/08/20 0812     Blood  Culture, Routine No growth after 5 days.    Blood culture [[id number]] Collected: 07/03/20 0711    Order Status: Completed Specimen: Blood Updated: 07/08/20 0812     Blood Culture, Routine No growth after 5 days.    Blood culture [[id number]] Collected: 07/02/20 1110    Order Status: Completed Specimen: Blood Updated: 07/07/20 1412     Blood Culture, Routine No growth after 5 days.    Culture, Anaerobe [[id number]]  (Abnormal) Collected: 07/01/20 2153    Order Status: Completed Specimen: Wound from Abdomen Updated: 07/07/20 1316     Anaerobic Culture FUSOBACTERIUM NUCLEATUM  Moderate        PREVOTELLA (B.) MELANINOGENICA  Moderate      Narrative:      Left chest incision.    Culture, Anaerobe [[id number]] Collected: 07/01/20 1741    Order Status: Completed Specimen: Incision site from Chest, Left Updated: 07/07/20 1314     Anaerobic Culture Culture in progress    Culture, Anaerobe [[id number]] Collected: 07/01/20 1741    Order Status: Completed Specimen: Skin from Abdomen Updated: 07/06/20 1022     Anaerobic Culture Culture in progress    Narrative:      Drive line site    Aerobic culture [[id number]]  (Abnormal)  (Susceptibility) Collected: 07/01/20 2153    Order Status: Completed Specimen: Wound from Abdomen Updated: 07/04/20 1214     Aerobic Bacterial Culture ENTEROBACTER CLOACAE  Moderate  No other significant isolate      Narrative:      Left chest incision    Aerobic culture [[id number]] Collected: 07/01/20 1741    Order Status: Completed Specimen: Skin from Abdomen Updated: 07/03/20 1203     Aerobic Bacterial Culture No significant isolate    Culture, Anaerobe [[id number]]     Order Status: Canceled Specimen: Skin from Abdomen     Aerobic culture [[id number]]     Order Status: Canceled Specimen: Skin from Abdomen     Aerobic culture [[id number]] Collected: 07/01/20 1741    Order Status: Canceled Specimen: Incision site from Chest, Left           BMP:   Recent Labs   Lab 07/08/20  0511   GLU 84      K 3.7       CO2 24   BUN 14   CREATININE 1.7*   CALCIUM 9.3   MG 2.2     Cardiac Markers: No results for input(s): CKMB, TROPONINT, MYOGLOBIN in the last 72 hours.  Coagulation:   Recent Labs   Lab 07/08/20  0511   INR 2.1*   APTT 34.8*     I have reviewed all pertinent labs within the past 24 hours.    Estimated Creatinine Clearance: 68.7 mL/min (A) (based on SCr of 1.7 mg/dL (H)).    Diagnostic Results:  I have reviewed all pertinent imaging results/findings within the past 24 hours.    Assessment and Plan:     55 yo AAM with DCM, HBP, CHF stage D s/p HM 2, ICD lead replacement 03/09/2020, hyperthyroidism,  sent in from clinic with a ICD pocket site infection. Patient wife reports yellow pus oozing from site this past week. Was prescribed antibiotics on Monday 6/29/20, and advised to come get admitted that day, but patient reported he didn't think it was necessary at that time. Denies fever, but reports intermittent nausea and chills, and Left side of Left hand tingling.      VAD parameters at baseline.  Pt speed decreased to 9800 rpms from 10,000 on 06/12/2020    ICD device check - 07/01/2020 --. Device interrogation revealed HV x 1 on 6/27/20 @ 3:41 pm for MMVT, Type 2 break.,CL-188 bpm. Patient reported sitting in car bending over when it occurred, not doing anything strenuous. Patient denies LOC. Patient reports overall feeling palpitations, tired, no energy, and COLEMAN, even prior to HV therapy.          * ICD (implantable cardioverter-defibrillator) infection  - presented with device pocket infection  - has been on oral doxycycline at home for the last 1 week with no improvement  - denies any fever but does admit to fatigue and chills   - labs pending   - ICD device check - 07/01/2020 --. Device interrogation revealed HV x 1 on 6/27/20 @ 3:41 pm for MMVT, Type 2 break.,CL-188 bpm.    Plan   - ID consulted who recommended IV vancomycin and IV cefepime. On 7/4 DC Vanco, c/w cefepime. Wound c/s positive for Enterobacter  cloacae. Blood c/s from 7/2 and 7/3 negative.  ID f/u appreciated- DC Vancomycin, changed to IV cefepime, continue 2 weeks post device extraction. Will need PICC on DC.  EP - plan for ICD removal, CTS consulted for input and back up plan. EP follow up appreciated.  - will f/u blood and site cultures and based on the sensitivities will narrow the antibitoics  - EP informed who will plan an outpatient generator change and pocket site debridement.  - patient will stay over the weekend o get IV antibiotics and get the final results of his culture.       Gout  - Uric acid  - S/W colchicine and allopurinol continued    Elevated LDH  LDH in 640 -> 585 -> 492 -> 474 after  250 ml fluids, reduced torsemide. BNP 97. Repeat TTE. LFT normal.  Will c/w coumadin. INR 2.2 today. Coumadin 7.5 mg. Given risk of GI bleed, patient not a candidate for heparin bridge. Also given concern risk of bleeding, hold off on starting persantine. C/W ASA 81 mg daily. Likely LDH elevation as an acute phase reactant from infection.         VT (ventricular tachycardia)  - device check showed a VT episode with shock on 06/27  - patient denies any LOC   - c/w amiodarone 200 mg daily.   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 . LFT normal.  Will c/w coumadin. INR 2.1. Coumadin 5 mg. Given risk of GI bleed, patient not a candidate for heparin bridge. Also given concern risk of bleeding, hold off on starting persantine. C/W ASA 81 mg daily. Likely LDH elevation as an acute phase reactant from infection.    -Pt reluctant to have speed increase as he had symptoms in the past with speed change, no active symptoms. Will hold speed change for now.  Also he prefers not to uptitrate his BP medications as he is anxious and would like not to increase medications at this time. Informed risks of  uncontrolled BP, pt aware of risks.   - INR 2.2 Goal of 2-3   - c/w coumadin  - patient denies any dark urine and no obvious scleral icterus on exam  - drive site clean and dry but will send site cultures.    Procedure: Device Interrogation Including analysis of device parameters  Current Settings: Ventricular Assist Device  Review of device function is stable/unstable stable    TXP LVAD INTERROGATIONS 7/8/2020 7/8/2020 7/7/2020 7/7/2020 7/7/2020 7/6/2020 7/6/2020   Type HeartMate II HeartMate II HeartMate II HeartMate II HeartMate II HeartMate II HeartMate II   Flow 5.3 4.9 5.5 5.1 5.1 3.3 5.0   Speed 9800 9800 9800 9800 9800 9800 9800   PI 3.1 4.6 4.1 3.0 3.9 5.0 4.8   Power (Jackson) 6.4 6.1 6.4 6.3 6.2 6.1 6.2   LSL 9400 9400 9400 9400 9400 9400 -   Pulsatility Intermittent pulse Intermittent pulse No Pulse No Pulse No Pulse No Pulse -       NICM (nonischemic cardiomyopathy)  - 7/4 bolus fluids. Reduce Torsemide to 20 mg daily.   - keep K > 4 and Mg >2    Seen and d/w Dr. John Hopkins MD  Heart Transplant  [facility]

## 2020-07-08 NOTE — ASSESSMENT & PLAN NOTE
LDH in 640 -> 585 -> 474 -> 445 Received 250 ml fluids on 7/4. . LFT normal.  Will c/w coumadin. INR 2.1. Coumadin 5 mg. Given risk of GI bleed, patient not a candidate for heparin bridge. Also given concern risk of bleeding, hold off on starting persantine. C/W ASA 81 mg daily. Likely LDH elevation as an acute phase reactant from infection.    -Pt reluctant to have speed increase as he had symptoms in the past with speed change, no active symptoms. Will hold speed change for now.  Also he prefers not to uptitrate his BP medications as he is anxious and would like not to increase medications at this time. Informed risks of uncontrolled BP, pt aware of risks.   - INR 2.2 Goal of 2-3   - c/w coumadin  - patient denies any dark urine and no obvious scleral icterus on exam  - drive site clean and dry but will send site cultures.    Procedure: Device Interrogation Including analysis of device parameters  Current Settings: Ventricular Assist Device  Review of device function is stable/unstable stable    TXP LVAD INTERROGATIONS 7/8/2020 7/8/2020 7/7/2020 7/7/2020 7/7/2020 7/6/2020 7/6/2020   Type HeartMate II HeartMate II HeartMate II HeartMate II HeartMate II HeartMate II HeartMate II   Flow 5.3 4.9 5.5 5.1 5.1 3.3 5.0   Speed 9800 9800 9800 9800 9800 9800 9800   PI 3.1 4.6 4.1 3.0 3.9 5.0 4.8   Power (Jackson) 6.4 6.1 6.4 6.3 6.2 6.1 6.2   LSL 9400 9400 9400 9400 9400 9400 -   Pulsatility Intermittent pulse Intermittent pulse No Pulse No Pulse No Pulse No Pulse -

## 2020-07-08 NOTE — PROGRESS NOTES
"[facility]  Adult Nutrition  Progress Note    SUMMARY       Recommendations  1. Continue cardiac diet as tolerated, with fluid restriction per MD.   2. RD to monitor.    Goals: Adequate PO intake to meet > 75% EEN/EPN by RD follow up  Nutrition Goal Status: goal met  Communication of RD Recs: other (comment)(POC)    Reason for Assessment    Reason For Assessment: RD follow-up  Diagnosis: (principal problem not specified)  Relevant Medical History: DCM, CHF s/p LVAD, HTN, hyperthyroidism  Interdisciplinary Rounds: attended  General Information Comments: Pt continues to report good appetite, 100% intake per RN documentation. Wt remains fairly stable since admission. NFPE not warranted, pt continues with no indicators of malnutrition at this time.  Nutrition Discharge Planning: d/c on cardiac diet    Nutrition Risk Screen    Nutrition Risk Screen: no indicators present    Nutrition/Diet History         Anthropometrics    Temp: 97.7 °F (36.5 °C)  Height Method: Stated  Height: 6' 1" (185.4 cm)  Height (inches): 73 in  Weight Method: Standard Scale  Weight: 121.6 kg (268 lb 1.3 oz)  Weight (lb): 268.08 lb  Ideal Body Weight (IBW), Male: 184 lb  % Ideal Body Weight, Male (lb): 144.57 %  BMI (Calculated): 35.4    Lab/Procedures/Meds    Pertinent Labs Reviewed: reviewed  Pertinent Labs Comments: Cr 1.7, GFR 45, CRP 43  Pertinent Medications Reviewed: reviewed  Pertinent Medications Comments: amiodarone, ferrous sulfate, pantoprazole, miralax, prednisone, senna-docusate, spironolactone, torsemide, vitamin D, warfarin    Estimated/Assessed Needs    Weight Used For Calorie Calculations: 123.4 kg (272 lb 0.8 oz)  Energy Calorie Requirements (kcal): 2133 kcal/day  Energy Need Method: [place]-St Jeor(x 1.0 PAL 2/2 obesity)  Protein Requirements:  g/day(0.8-1 g/kg)  Weight Used For Protein Calculations: 123.4 kg (272 lb 0.8 oz)  Fluid Requirements (mL): per MD or 1 mL/kcal     RDA Method (mL): " 2133    Nutrition Prescription Ordered    Current Diet Order: Cardiac  Nutrition Order Comments: 2000mL FR    Evaluation of Received Nutrient/Fluid Intake    I/O: -4.3L since admit  Comments: LBM 7/5  % Intake of Estimated Energy Needs: 75 - 100 %  % Meal Intake: 75 - 100 %    Nutrition Risk    Level of Risk/Frequency of Follow-up: low(1x/week)     Assessment and Plan    Nutrition Problem  Increased nutrient needs     Related to (etiology):   Physiological demands     Signs and Symptoms (as evidenced by):   ICD pocket site infection     Interventions (treatment strategy):  Collaboration with other providers     Nutrition Diagnosis Status:   Continues    Monitor and Evaluation    Food and Nutrient Intake: energy intake, food and beverage intake  Food and Nutrient Adminstration: diet order  Knowledge/Beliefs/Attitudes: food and nutrition knowledge/skill  Anthropometric Measurements: weight, weight change, body mass index  Biochemical Data, Medical Tests and Procedures: electrolyte and renal panel, gastrointestinal profile, glucose/endocrine profile, inflammatory profile, lipid profile  Nutrition-Focused Physical Findings: overall appearance     Nutrition Follow-Up    RD Follow-up?: Yes

## 2020-07-08 NOTE — SUBJECTIVE & OBJECTIVE
Interval History:   Patient evaluated at bedside.    Wound= Enterobacter cloacae. Blood c/s negative - on IV cefepime     PICC Line in place- will need left peripheral access    HTS following > LVAD set to 9800 rpm (pt states he does not tolerate 10,000rpm well; develops HA and left hand numbness)    INR this morning 2.1  Hb 12.2, plt 219- stable  Cr. 1.7    ROS  Objective:     Vital Signs (Most Recent):  Temp: 97.7 °F (36.5 °C) (07/08/20 0704)  Pulse: 69 (07/08/20 1106)  Resp: 16 (07/08/20 0704)  BP: (!) 94/0 (07/08/20 0704)  SpO2: 97 % (07/08/20 0704) Vital Signs (24h Range):  Temp:  [97.7 °F (36.5 °C)-98 °F (36.7 °C)] 97.7 °F (36.5 °C)  Pulse:  [67-78] 69  Resp:  [16-18] 16  SpO2:  [94 %-98 %] 97 %  BP: ()/(0-75) 94/0     Weight: 121.6 kg (268 lb 1.3 oz)  Body mass index is 35.37 kg/m².     SpO2: 97 %  O2 Device (Oxygen Therapy): room air    Physical Exam   Constitutional: He is oriented to person, place, and time. He appears well-developed and well-nourished. No distress.   HENT:   Head: Normocephalic.   Left Ear: External ear normal.   Eyes: Pupils are equal, round, and reactive to light. Right eye exhibits no discharge. Left eye exhibits no discharge.   Neck: Normal range of motion. No thyromegaly present.   Cardiovascular: Normal rate and regular rhythm. Exam reveals no friction rub.   Murmur heard.  Smooth VAD hum   Pulmonary/Chest: Effort normal and breath sounds normal. No respiratory distress.   Abdominal: Soft. Bowel sounds are normal. He exhibits no distension. There is no abdominal tenderness.   Musculoskeletal: Normal range of motion.         General: No edema.   Neurological: He is alert and oriented to person, place, and time.   Skin: Skin is warm and dry. No rash noted. No erythema.            Significant Labs:   CMP:   Recent Labs   Lab 07/07/20  0545 07/08/20  0511    136   K 4.1 3.7    102   CO2 27 24   GLU 79 84   BUN 13 14   CREATININE 1.6* 1.7*   CALCIUM 9.4 9.3   PROT  --   7.4   ALBUMIN  --  3.5   BILITOT  --  0.5   ALKPHOS  --  94   AST  --  30   ALT  --  23   ANIONGAP 7* 10   ESTGFRAFRICA 56.0* 52.1*   EGFRNONAA 48.5* 45.0*   , CBC:   Recent Labs   Lab 07/07/20  0545 07/08/20  0511   WBC 6.83 5.89   HGB 12.4* 12.2*   HCT 40.5 39.6*    219   , INR:   Recent Labs   Lab 07/07/20  0545 07/08/20  0511   INR 2.2* 2.1*    and All pertinent lab results from the last 24 hours have been reviewed.    Significant Imaging: Echocardiogram:   Transthoracic echo (TTE) complete (Cupid Only):   Results for orders placed or performed during the hospital encounter of 07/01/20   Echo Color Flow Doppler? Yes   Result Value Ref Range    Ascending aorta 3.34 cm    STJ 2.96 cm    IVS 1.04 0.6 - 1.1 cm    LA size 3.90 cm    Left Atrium Major Axis 6.81 cm    Left Atrium Minor Axis 6.81 cm    LVIDD 9.50 (A) 3.5 - 6.0 cm    LVIDS 9.37 (A) 2.1 - 4.0 cm    LVOT diameter 2.49 cm    PW 1.34 (A) 0.6 - 1.1 cm    MV Peak A Jorge 0.49 m/s    E wave decelartion time 112.55 msec    MV Peak E Jorge 0.78 m/s    RA Major Axis 5.76 cm    RA Width 4.15 cm    Sinus 3.16 cm    TR Max Jorge 2.09 m/s    TDI LATERAL 0.10 m/s    TDI SEPTAL 0.08 m/s    LA WIDTH 4.84 cm    MV stenosis pressure 1/2 time 32.64 ms    LV Diastolic Volume 547.08 mL    LV Systolic Volume 489.59 mL    RV S' 10.73 cm/s    LV LATERAL E/E' RATIO 7.80 m/s    LV SEPTAL E/E' RATIO 9.75 m/s    FS 1 %    LA volume 109.26 cm3    LV mass 682.26 g    Left Ventricle Relative Wall Thickness 0.28 cm    MV valve area p 1/2 method 6.74 cm2    E/A ratio 1.59     Mean e' 0.09 m/s    LVOT area 4.9 cm2    E/E' ratio 8.67 m/s    LV Systolic Volume Index 201.6 mL/m2    LV Diastolic Volume Index 225.22 mL/m2    LA Volume Index 45.0 mL/m2    LV Mass Index 281 g/m2    Triscuspid Valve Regurgitation Peak Gradient 17 mmHg    BSA 2.49 m2    Narrative    · Eccentric left ventricular hypertrophy. Severely decreased left   ventricular systolic function. The estimated ejection fraction  is 10%.  · Grade I (mild) left ventricular diastolic dysfunction consistent with   impaired relaxation.  · Moderate left atrial enlargement.  · Small posterior pericardial effusion.  · Mild mitral regurgitation.  · Mild tricuspid regurgitation.  · LVAD present. Base speed is 9800 RPMs. The pump type is a Heartmate II.   The interventricular septum appears midline. LVEDD 9.5cm. Unable to   comment on AV  · RV not well seen. Appears normal in size on limited images.     There is a suggestion of a mobile mass on a lead in the right atrium   (image 47)

## 2020-07-08 NOTE — PROGRESS NOTES
07/08/2020  Ayesha Garay    Current provider:  Amparo Lopez MD      I, Ayesha Garay, rounded on Tim Richards to ensure all mechanical assist device settings (IABP or VAD) were appropriate and all parameters were within limits.  I was able to ensure all back up equipment was present, the staff had no issues, and the Perfusion Department daily rounding was complete.    9:59 AM

## 2020-07-08 NOTE — PLAN OF CARE
Patient continues with antibiotic therapy for ICD infection; Plan for lead extraction Friday. LVAD #'s and dopplers WDL. Kit dressing change is every other day; due Sunday 7/9. Patient has DND 11-4. Patient remains free from falls and injuries through out shift. Patient AAO and VSS. Plan of care reviewed with patient. Patient verbalizes understanding of plan. Will continue to monitor.

## 2020-07-08 NOTE — PLAN OF CARE
Pt is a 53 yo M with an LVAD who was admitted for ICD infection. Pt is AAOx 4, and independent. Pt is calm and cooperative. Fall precautions in place -- SR x2, call light in reach, bed in lowest position. Free of falls, traumas, and injury. Pt has an CRISTÓBAL PICC, flushes well and is SL. Pt also has an L chest ICD infection -- RACHANA due to dressing. Dressed with Tegaderm and Gauze, no drainage. Pt has RLQ DLES, dressing CDI. Pt is a kit dressing to be done daily. Skin otherwise intact. Pt re-educated that dressing changes were to be done daily, pt states that he performs them every other day. Pt demonstrates and verbalizes understanding. VSS on RA. Pt is being monitored in Lead II and V1 and is in NSR with LVAD Artifact. Pt denies pain. Plan of care reviewed with pt. Questions encouraged, and answered.

## 2020-07-08 NOTE — PROGRESS NOTES
Spoke with pt.   Pt denies any needs at this time.  Encouraged pt to notify nurse if they have any questions, problems or concerns for LVAD coordinator.  Pt verbalized understanding and in agreement of plan. Will follow up with pt soon.

## 2020-07-08 NOTE — NURSING
Pt currently admitted in the hospital, and met patient at the bedside for the 30 month follow-up.  Pt verbalized consent and willingness to continue to participate with the [employer] registry.      Patient completed the EQ-5D quality of life questionnaire, KCCQ, and the Trail Making neurocognitive test in 60 seconds.

## 2020-07-08 NOTE — PROGRESS NOTES
[facility]  Cardiac Electrophysiology  Progress Note    Admission Date: 7/1/2020  Code Status: Full Code   Attending Physician: Amparo Lopez MD   Expected Discharge Date: 7/10/2020  Principal Problem:ICD (implantable cardioverter-defibrillator) infection    Subjective:     Interval History:   Patient evaluated at bedside.    Wound= Enterobacter cloacae. Blood c/s negative - on IV cefepime     PICC Line in place- will need left peripheral access    HTS following > LVAD set to 9800 rpm (pt states he does not tolerate 10,000rpm well; develops HA and left hand numbness)    INR this morning 2.1  Hb 12.2, plt 219- stable  Cr. 1.7    ROS  Objective:     Vital Signs (Most Recent):  Temp: 97.7 °F (36.5 °C) (07/08/20 0704)  Pulse: 69 (07/08/20 1106)  Resp: 16 (07/08/20 0704)  BP: (!) 94/0 (07/08/20 0704)  SpO2: 97 % (07/08/20 0704) Vital Signs (24h Range):  Temp:  [97.7 °F (36.5 °C)-98 °F (36.7 °C)] 97.7 °F (36.5 °C)  Pulse:  [67-78] 69  Resp:  [16-18] 16  SpO2:  [94 %-98 %] 97 %  BP: ()/(0-75) 94/0     Weight: 121.6 kg (268 lb 1.3 oz)  Body mass index is 35.37 kg/m².     SpO2: 97 %  O2 Device (Oxygen Therapy): room air    Physical Exam   Constitutional: He is oriented to person, place, and time. He appears well-developed and well-nourished. No distress.   HENT:   Head: Normocephalic.   Left Ear: External ear normal.   Eyes: Pupils are equal, round, and reactive to light. Right eye exhibits no discharge. Left eye exhibits no discharge.   Neck: Normal range of motion. No thyromegaly present.   Cardiovascular: Normal rate and regular rhythm. Exam reveals no friction rub.   Murmur heard.  Smooth VAD hum   Pulmonary/Chest: Effort normal and breath sounds normal. No respiratory distress.   Abdominal: Soft. Bowel sounds are normal. He exhibits no distension. There is no abdominal tenderness.   Musculoskeletal: Normal range of motion.         General: No edema.   Neurological: He is alert and oriented  to person, place, and time.   Skin: Skin is warm and dry. No rash noted. No erythema.            Significant Labs:   CMP:   Recent Labs   Lab 07/07/20  0545 07/08/20  0511    136   K 4.1 3.7    102   CO2 27 24   GLU 79 84   BUN 13 14   CREATININE 1.6* 1.7*   CALCIUM 9.4 9.3   PROT  --  7.4   ALBUMIN  --  3.5   BILITOT  --  0.5   ALKPHOS  --  94   AST  --  30   ALT  --  23   ANIONGAP 7* 10   ESTGFRAFRICA 56.0* 52.1*   EGFRNONAA 48.5* 45.0*   , CBC:   Recent Labs   Lab 07/07/20  0545 07/08/20  0511   WBC 6.83 5.89   HGB 12.4* 12.2*   HCT 40.5 39.6*    219   , INR:   Recent Labs   Lab 07/07/20  0545 07/08/20  0511   INR 2.2* 2.1*    and All pertinent lab results from the last 24 hours have been reviewed.    Significant Imaging: Echocardiogram:   Transthoracic echo (TTE) complete (Cupid Only):   Results for orders placed or performed during the hospital encounter of 07/01/20   Echo Color Flow Doppler? Yes   Result Value Ref Range    Ascending aorta 3.34 cm    STJ 2.96 cm    IVS 1.04 0.6 - 1.1 cm    LA size 3.90 cm    Left Atrium Major Axis 6.81 cm    Left Atrium Minor Axis 6.81 cm    LVIDD 9.50 (A) 3.5 - 6.0 cm    LVIDS 9.37 (A) 2.1 - 4.0 cm    LVOT diameter 2.49 cm    PW 1.34 (A) 0.6 - 1.1 cm    MV Peak A Jorge 0.49 m/s    E wave decelartion time 112.55 msec    MV Peak E Jorge 0.78 m/s    RA Major Axis 5.76 cm    RA Width 4.15 cm    Sinus 3.16 cm    TR Max Jorge 2.09 m/s    TDI LATERAL 0.10 m/s    TDI SEPTAL 0.08 m/s    LA WIDTH 4.84 cm    MV stenosis pressure 1/2 time 32.64 ms    LV Diastolic Volume 547.08 mL    LV Systolic Volume 489.59 mL    RV S' 10.73 cm/s    LV LATERAL E/E' RATIO 7.80 m/s    LV SEPTAL E/E' RATIO 9.75 m/s    FS 1 %    LA volume 109.26 cm3    LV mass 682.26 g    Left Ventricle Relative Wall Thickness 0.28 cm    MV valve area p 1/2 method 6.74 cm2    E/A ratio 1.59     Mean e' 0.09 m/s    LVOT area 4.9 cm2    E/E' ratio 8.67 m/s    LV Systolic Volume Index 201.6 mL/m2    LV Diastolic  Volume Index 225.22 mL/m2    LA Volume Index 45.0 mL/m2    LV Mass Index 281 g/m2    Triscuspid Valve Regurgitation Peak Gradient 17 mmHg    BSA 2.49 m2    Narrative    · Eccentric left ventricular hypertrophy. Severely decreased left   ventricular systolic function. The estimated ejection fraction is 10%.  · Grade I (mild) left ventricular diastolic dysfunction consistent with   impaired relaxation.  · Moderate left atrial enlargement.  · Small posterior pericardial effusion.  · Mild mitral regurgitation.  · Mild tricuspid regurgitation.  · LVAD present. Base speed is 9800 RPMs. The pump type is a Heartmate II.   The interventricular septum appears midline. LVEDD 9.5cm. Unable to   comment on AV  · RV not well seen. Appears normal in size on limited images.     There is a suggestion of a mobile mass on a lead in the right atrium   (image 47)       Assessment and Plan:     * ICD (implantable cardioverter-defibrillator) infection  BiV-Icd Medtronic (2014 Dr. Chiu), successful DFT at 35J on 10/18/2019. Multiple VT episodes,  generator change (BiV ICD -> dual ICD),  device revision (addition of a new RV/ICD lead) and another VF induction via ICD on 3/2020. Presented from device clinic with device erosion.     - Echo done 7/5 Eccentric left ventricular hypertrophy. Severely decreased left ventricular systolic function. EF 10%. LVAD present. Base speed is 9800 RPMs. Evidence suggesting mobile mass on RA lead  - Blood cultured negative to date  - Wound cultures growing Enterobacter.  - Antibiotic regimen per ID recommendations. Switched to Cefepime q8h.  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    - CT surgery consulted for evaluation and back up plan recommendations regarding device extraction. Given prior surgical history no surgical back up will be present at lead removal.  - Device extraction scheduled for Friday 7/10/20 with Dr. Yun   - will need COVID testing   - will need left peripheral access   - INR 2.1 today. Coumadin 5 mg. Given risk of GI bleed, patient not a candidate for heparin bridge. Recommend keep INR as low as is safe per CT sx/HTS.        Lana Garza MD  Cardiac Electrophysiology  [facility]

## 2020-07-08 NOTE — ASSESSMENT & PLAN NOTE
BiV-Icd Medtronic (2014 Dr. Chiu), successful DFT at 35J on 10/18/2019. Multiple VT episodes,  generator change (BiV ICD -> dual ICD),  device revision (addition of a new RV/ICD lead) and another VF induction via ICD on 3/2020. Presented from device clinic with device erosion.     - Echo done 7/5 Eccentric left ventricular hypertrophy. Severely decreased left ventricular systolic function. EF 10%. LVAD present. Base speed is 9800 RPMs. Evidence suggesting mobile mass on RA lead  - Blood cultured negative to date  - Wound cultures growing Enterobacter.  - Antibiotic regimen per ID recommendations. Switched to Cefepime q8h.  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   - CT surgery consulted for evaluation and back up plan recommendations regarding device extraction. Given prior surgical history no surgical back up will be present at lead removal.  - Device extraction scheduled for Friday 7/10/20 with Dr. Yun   - will need COVID testing   - will need left peripheral access   - INR 2.1 today. Coumadin 5 mg. Given risk of GI bleed, patient not a candidate for heparin bridge. Recommend keep INR as low as is safe per CT sx/HTS.

## 2020-07-08 NOTE — PROGRESS NOTES
Consult received per MD for ICD pocket site.    Pt was admitted on 7/1/20 with ICD (implantable cardioverter defibrillator) infection with a PMHx of DCM, HBP, CHF stage D s/p HM 2, ICD lead replacement 03/09/2020, hyperthyroidism.    Received pt sitting up on side of bed awake and alert. Pt reports that he is slated to go to surgery sometime this week to have the ICD extracted and that he has been on IV antibiotics.     Upon assessment of left chest: ulceration with exposed hardware noted to base of wound bed with brown serosanguinous drainage noted. Cleansed wound bed with sterile normal saline and applied sensi care barrier film to emilie wound area with a sterile gauze and tegaderm film applied to cover area.    (see assessment and photos below)    Chart reflects per Dr. Rebecca Hopkins with transplant heart progress note from 7/7/20 plan for ICD removal on Friday (7/10/20).       Recommendations:  -Nursing to follow wound care orders as follows:Left upper chest- ICD- cleanse wound with sterile normal saline, pat dry, cover with sterile 2x2 gauze and secure with tegaderm film.  Use sensicare barrier film to remove old dressing to avoid skin injury.    Plan of care discussed with pt. Nursing continue care. Wound care to sign off at this time. Please reconsult wound care if further assistance is needed. [id number]           07/08/20 0906        Incision/Site 03/09/20 Left Chest   Date First Assessed: 03/09/20   Present Prior to Hospital Arrival?: No  Side: Left  Location: Chest   Wound Image    Incision WDL ex   Dressing Appearance Dry;Intact;Clean   Drainage Amount Small   Drainage Characteristics/Odor Brown;Serosanguineous   Appearance Pink;Hardware;Other (see comments)  (exposed hardware)   Periwound Area Intact   Wound Edges Open   Wound Length (cm) 0.5 cm   Wound Width (cm) 0.5 cm   Wound Depth (cm) 0.4 cm   Wound Volume (cm^3) 0.1 cm^3   Wound Surface Area (cm^2) 0.25 cm^2   Care Cleansed with:;Sterile normal  saline;Applied:;Skin Barrier   Dressing Applied;Changed;Transparent film;Gauze   Periwound Care Dry periwound area maintained;Skin barrier film applied   Dressing Change Due 07/08/20

## 2020-07-08 NOTE — SUBJECTIVE & OBJECTIVE
Interval History: Patient seen and examined today at bedside. Wound c/s positive for Enterobacter cloacae from 7/2. Blood c/s from 7/2 and 7/3 negative.  ID f/u appreciated- DC Vancomycin, changed to IV cefepime, continue 2 weeks post device extraction. PICC on 7/6/2020.  EP - plan for ICD removal on Friday, CTS consulted for input and back up plan. EP follow up appreciated. Life vest on discharge.  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   LDH in 640 -> 585 -> 492 -> 474- Received 250 ml fluids on 7/4. Repeat TTE at 9800 with LV 9.5 cm, suggestion of a mobile mass on a lead in the atrium. LFT normal.  Will c/w coumadin. INR 2.2 today. Increase guafacine to 2 mg qhs.  Pt reluctant to have speed increase as he had symptoms in the past with speed change, no active symptoms. Will hold speed change for now.  Also he prefers not to uptitrate his BP medications as he is anxious and would like not to increase medications at this time. Informed risks of uncontrolled BP, pt aware of risks.   Gout- s/w colchicine improved.      Continuous Infusions:  Scheduled Meds:   allopurinoL  100 mg Oral Daily    amiodarone  200 mg Oral Daily    amLODIPine  10 mg Oral Daily    aspirin  81 mg Oral Daily    ceFEPime (MAXIPIME) IVPB  1 g Intravenous Q8H    colchicine  0.6 mg Oral Daily    doxazosin  8 mg Oral Q12H    ferrous gluconate  324 mg Oral Daily with lunch    fluticasone propionate  2 spray Each Nostril Daily    guanFACINE  2 mg Oral QHS    pantoprazole  40 mg Oral Daily with lunch    paroxetine  10 mg Oral QAM    potassium chloride SA  20 mEq Oral BID    predniSONE  2.5 mg Oral Daily    sodium chloride 0.9%  10 mL Intravenous Q6H    spironolactone  25 mg Oral Daily with lunch    torsemide  20 mg Oral Daily    vitamin D  1,000 Units Oral Daily    warfarin  5 mg Oral Daily     PRN Meds:acetaminophen, polyethylene glycol, senna-docusate 8.6-50 mg,  Flushing PICC Protocol **AND** sodium chloride 0.9% **AND** sodium chloride 0.9%, zolpidem    Review of patient's allergies indicates:   Allergen Reactions    Lisinopril Anaphylaxis    Hydralazine analogues      Chronic constipation, impotence, dizziness     Objective:     Vital Signs (Most Recent):  Temp: 97.7 °F (36.5 °C) (07/08/20 1137)  Pulse: 69 (07/08/20 1106)  Resp: 17 (07/08/20 1137)  BP: (!) 94/0 (07/08/20 0704)  SpO2: 97 % (07/08/20 1137) Vital Signs (24h Range):  Temp:  [97.7 °F (36.5 °C)-98 °F (36.7 °C)] 97.7 °F (36.5 °C)  Pulse:  [67-78] 69  Resp:  [16-18] 17  SpO2:  [94 %-98 %] 97 %  BP: ()/(0-75) 94/0     Patient Vitals for the past 72 hrs (Last 3 readings):   Weight   07/08/20 0800 121.6 kg (268 lb 1.3 oz)   07/06/20 0900 123.7 kg (272 lb 11.3 oz)   07/05/20 1357 120.7 kg (266 lb)     Body mass index is 35.37 kg/m².      Intake/Output Summary (Last 24 hours) at 7/8/2020 1141  Last data filed at 7/8/2020 1100  Gross per 24 hour   Intake 240 ml   Output 2025 ml   Net -1785 ml       Hemodynamic Parameters:       Telemetry: VAD artifact/ PVC    Physical Exam  Vitals signs and nursing note reviewed.   Constitutional:       Appearance: Normal appearance.   HENT:      Head: Normocephalic and atraumatic.      Nose: No congestion.      Mouth/Throat:      Mouth: Mucous membranes are moist.   Eyes:      Extraocular Movements: Extraocular movements intact.      Pupils: Pupils are equal, round, and reactive to light.   Neck:      Musculoskeletal: Normal range of motion.   Cardiovascular:      Rate and Rhythm: Normal rate and regular rhythm.      Comments: Smooth VAD hum  Pulmonary:      Effort: Pulmonary effort is normal. No respiratory distress.      Breath sounds: Normal breath sounds. No rales.   Abdominal:      General: Abdomen is flat. Bowel sounds are normal. There is no distension.      Tenderness: There is no abdominal tenderness. There is no guarding.   Musculoskeletal:         General: No  swelling.   Skin:     General: Skin is warm.      Comments: Tenderness and redness around the ICD site.   Neurological:      General: No focal deficit present.      Mental Status: He is alert.   Psychiatric:         Mood and Affect: Mood normal.         Significant Labs:  CBC:  Recent Labs   Lab 07/06/20  0701 07/07/20 0545 07/08/20  0511   WBC 6.66 6.83 5.89   RBC 4.32* 4.64 4.48*   HGB 11.9* 12.4* 12.2*   HCT 38.6* 40.5 39.6*    211 219   MCV 89 87 88   MCH 27.5 26.7* 27.2   MCHC 30.8* 30.6* 30.8*     BNP:  Recent Labs   Lab 07/03/20  0709 07/06/20  0702 07/08/20  0511   BNP 97 124* 74     CMP:  Recent Labs   Lab 07/03/20  0709  07/06/20 0701 07/07/20 0545 07/08/20  0511   GLU  --    < > 89 79 84   CALCIUM  --    < > 9.3 9.4 9.3   ALBUMIN 3.5  --  3.4*  --  3.5   PROT 7.2  --  7.1  --  7.4   NA  --    < > 135* 137 136   K  --    < > 4.1 4.1 3.7   CO2  --    < > 23 27 24   CL  --    < > 102 103 102   BUN  --    < > 15 13 14   CREATININE  --    < > 1.6* 1.6* 1.7*   ALKPHOS 94  --  98  --  94   ALT 19  --  21  --  23   AST 27  --  24  --  30   BILITOT 0.4  --  0.4  --  0.5    < > = values in this interval not displayed.      Coagulation:   Recent Labs   Lab 07/06/20 0701 07/07/20 0545 07/08/20  0511   INR 2.2* 2.2* 2.1*   APTT 34.3* 34.9* 34.8*     LDH:  Recent Labs   Lab 07/06/20 0701 07/07/20 0545 07/08/20  0511   * 474* 445*     Microbiology:  Microbiology Results (last 7 days)     Procedure Component Value Units Date/Time    Blood culture [[id number]] Collected: 07/03/20 0712    Order Status: Completed Specimen: Blood Updated: 07/08/20 0812     Blood Culture, Routine No growth after 5 days.    Blood culture [[id number]] Collected: 07/03/20 0711    Order Status: Completed Specimen: Blood Updated: 07/08/20 0812     Blood Culture, Routine No growth after 5 days.    Blood culture [[id number]] Collected: 07/02/20 1110    Order Status: Completed Specimen: Blood Updated: 07/07/20 1412     Blood  Culture, Routine No growth after 5 days.    Culture, Anaerobe [[id number]]  (Abnormal) Collected: 07/01/20 2153    Order Status: Completed Specimen: Wound from Abdomen Updated: 07/07/20 1316     Anaerobic Culture FUSOBACTERIUM NUCLEATUM  Moderate        PREVOTELLA (B.) MELANINOGENICA  Moderate      Narrative:      Left chest incision.    Culture, Anaerobe [[id number]] Collected: 07/01/20 1741    Order Status: Completed Specimen: Incision site from Chest, Left Updated: 07/07/20 1314     Anaerobic Culture Culture in progress    Culture, Anaerobe [[id number]] Collected: 07/01/20 1741    Order Status: Completed Specimen: Skin from Abdomen Updated: 07/06/20 1022     Anaerobic Culture Culture in progress    Narrative:      Drive line site    Aerobic culture [[id number]]  (Abnormal)  (Susceptibility) Collected: 07/01/20 2153    Order Status: Completed Specimen: Wound from Abdomen Updated: 07/04/20 1214     Aerobic Bacterial Culture ENTEROBACTER CLOACAE  Moderate  No other significant isolate      Narrative:      Left chest incision    Aerobic culture [[id number]] Collected: 07/01/20 1741    Order Status: Completed Specimen: Skin from Abdomen Updated: 07/03/20 1203     Aerobic Bacterial Culture No significant isolate    Culture, Anaerobe [[id number]]     Order Status: Canceled Specimen: Skin from Abdomen     Aerobic culture [[id number]]     Order Status: Canceled Specimen: Skin from Abdomen     Aerobic culture [[id number]] Collected: 07/01/20 1741    Order Status: Canceled Specimen: Incision site from Chest, Left           BMP:   Recent Labs   Lab 07/08/20  0511   GLU 84      K 3.7      CO2 24   BUN 14   CREATININE 1.7*   CALCIUM 9.3   MG 2.2     Cardiac Markers: No results for input(s): CKMB, TROPONINT, MYOGLOBIN in the last 72 hours.  Coagulation:   Recent Labs   Lab 07/08/20  0511   INR 2.1*   APTT 34.8*     I have reviewed all pertinent labs within the past 24 hours.    Estimated Creatinine Clearance: 68.7 mL/min  (A) (based on SCr of 1.7 mg/dL (H)).    Diagnostic Results:  I have reviewed all pertinent imaging results/findings within the past 24 hours.

## 2020-07-09 ENCOUNTER — ANESTHESIA EVENT (OUTPATIENT)
Dept: MEDSURG UNIT | Facility: HOSPITAL | Age: 55
DRG: 261 | End: 2020-07-09
Payer: COMMERCIAL

## 2020-07-09 LAB
ABO + RH BLD: NORMAL
ANION GAP SERPL CALC-SCNC: 6 MMOL/L (ref 8–16)
APTT BLDCRRT: 34.9 SEC (ref 21–32)
BACTERIA SPEC ANAEROBE CULT: NORMAL
BACTERIA SPEC ANAEROBE CULT: NORMAL
BASOPHILS # BLD AUTO: 0.02 K/UL (ref 0–0.2)
BASOPHILS NFR BLD: 0.3 % (ref 0–1.9)
BLD GP AB SCN CELLS X3 SERPL QL: NORMAL
BUN SERPL-MCNC: 12 MG/DL (ref 6–20)
CALCIUM SERPL-MCNC: 9 MG/DL (ref 8.7–10.5)
CHLORIDE SERPL-SCNC: 105 MMOL/L (ref 95–110)
CO2 SERPL-SCNC: 24 MMOL/L (ref 23–29)
CREAT SERPL-MCNC: 1.5 MG/DL (ref 0.5–1.4)
DIFFERENTIAL METHOD: ABNORMAL
EOSINOPHIL # BLD AUTO: 0.2 K/UL (ref 0–0.5)
EOSINOPHIL NFR BLD: 3.1 % (ref 0–8)
ERYTHROCYTE [DISTWIDTH] IN BLOOD BY AUTOMATED COUNT: 15.3 % (ref 11.5–14.5)
EST. GFR  (AFRICAN AMERICAN): >60 ML/MIN/1.73 M^2
EST. GFR  (NON AFRICAN AMERICAN): 52.4 ML/MIN/1.73 M^2
GLUCOSE SERPL-MCNC: 85 MG/DL (ref 70–110)
HCT VFR BLD AUTO: 36.8 % (ref 40–54)
HGB BLD-MCNC: 11.4 G/DL (ref 14–18)
IMM GRANULOCYTES # BLD AUTO: 0.04 K/UL (ref 0–0.04)
IMM GRANULOCYTES NFR BLD AUTO: 0.7 % (ref 0–0.5)
INR PPP: 1.9 (ref 0.8–1.2)
LDH SERPL L TO P-CCNC: 416 U/L (ref 110–260)
LYMPHOCYTES # BLD AUTO: 0.8 K/UL (ref 1–4.8)
LYMPHOCYTES NFR BLD: 14.1 % (ref 18–48)
MAGNESIUM SERPL-MCNC: 2.1 MG/DL (ref 1.6–2.6)
MCH RBC QN AUTO: 27.1 PG (ref 27–31)
MCHC RBC AUTO-ENTMCNC: 31 G/DL (ref 32–36)
MCV RBC AUTO: 87 FL (ref 82–98)
MONOCYTES # BLD AUTO: 0.7 K/UL (ref 0.3–1)
MONOCYTES NFR BLD: 12.4 % (ref 4–15)
NEUTROPHILS # BLD AUTO: 4 K/UL (ref 1.8–7.7)
NEUTROPHILS NFR BLD: 69.4 % (ref 38–73)
NRBC BLD-RTO: 0 /100 WBC
PHOSPHATE SERPL-MCNC: 2.9 MG/DL (ref 2.7–4.5)
PLATELET # BLD AUTO: 214 K/UL (ref 150–350)
PMV BLD AUTO: 10.8 FL (ref 9.2–12.9)
POTASSIUM SERPL-SCNC: 4.3 MMOL/L (ref 3.5–5.1)
PROTHROMBIN TIME: 18.5 SEC (ref 9–12.5)
RBC # BLD AUTO: 4.21 M/UL (ref 4.6–6.2)
SARS-COV-2 RDRP RESP QL NAA+PROBE: NEGATIVE
SODIUM SERPL-SCNC: 135 MMOL/L (ref 136–145)
WBC # BLD AUTO: 5.73 K/UL (ref 3.9–12.7)

## 2020-07-09 PROCEDURE — 99233 SBSQ HOSP IP/OBS HIGH 50: CPT | Mod: ,,, | Performed by: INTERNAL MEDICINE

## 2020-07-09 PROCEDURE — 63600175 PHARM REV CODE 636 W HCPCS: Performed by: INTERNAL MEDICINE

## 2020-07-09 PROCEDURE — 85025 COMPLETE CBC W/AUTO DIFF WBC: CPT

## 2020-07-09 PROCEDURE — 25000003 PHARM REV CODE 250

## 2020-07-09 PROCEDURE — 85610 PROTHROMBIN TIME: CPT

## 2020-07-09 PROCEDURE — 63600175 PHARM REV CODE 636 W HCPCS: Performed by: STUDENT IN AN ORGANIZED HEALTH CARE EDUCATION/TRAINING PROGRAM

## 2020-07-09 PROCEDURE — 25000003 PHARM REV CODE 250: Performed by: STUDENT IN AN ORGANIZED HEALTH CARE EDUCATION/TRAINING PROGRAM

## 2020-07-09 PROCEDURE — 25000003 PHARM REV CODE 250: Performed by: INTERNAL MEDICINE

## 2020-07-09 PROCEDURE — 84100 ASSAY OF PHOSPHORUS: CPT

## 2020-07-09 PROCEDURE — 99233 PR SUBSEQUENT HOSPITAL CARE,LEVL III: ICD-10-PCS | Mod: ,,, | Performed by: INTERNAL MEDICINE

## 2020-07-09 PROCEDURE — 83615 LACTATE (LD) (LDH) ENZYME: CPT

## 2020-07-09 PROCEDURE — 94761 N-INVAS EAR/PLS OXIMETRY MLT: CPT

## 2020-07-09 PROCEDURE — 20600001 HC STEP DOWN PRIVATE ROOM

## 2020-07-09 PROCEDURE — 86901 BLOOD TYPING SEROLOGIC RH(D): CPT

## 2020-07-09 PROCEDURE — 27000248 HC VAD-ADDITIONAL DAY

## 2020-07-09 PROCEDURE — 93750 INTERROGATION VAD IN PERSON: CPT | Mod: ,,, | Performed by: INTERNAL MEDICINE

## 2020-07-09 PROCEDURE — 83735 ASSAY OF MAGNESIUM: CPT

## 2020-07-09 PROCEDURE — 93750 PR INTERROGATE VENT ASSIST DEV, IN PERSON, W PHYSICIAN ANALYSIS: ICD-10-PCS | Mod: ,,, | Performed by: INTERNAL MEDICINE

## 2020-07-09 PROCEDURE — 86920 COMPATIBILITY TEST SPIN: CPT

## 2020-07-09 PROCEDURE — 80048 BASIC METABOLIC PNL TOTAL CA: CPT

## 2020-07-09 PROCEDURE — 85730 THROMBOPLASTIN TIME PARTIAL: CPT

## 2020-07-09 PROCEDURE — U0002 COVID-19 LAB TEST NON-CDC: HCPCS

## 2020-07-09 PROCEDURE — A4216 STERILE WATER/SALINE, 10 ML: HCPCS | Performed by: INTERNAL MEDICINE

## 2020-07-09 RX ORDER — HYDROCODONE BITARTRATE AND ACETAMINOPHEN 500; 5 MG/1; MG/1
TABLET ORAL
Status: DISCONTINUED | OUTPATIENT
Start: 2020-07-09 | End: 2020-07-10

## 2020-07-09 RX ADMIN — PREDNISONE 2.5 MG: 2.5 TABLET ORAL at 09:07

## 2020-07-09 RX ADMIN — AMLODIPINE BESYLATE 10 MG: 10 TABLET ORAL at 09:07

## 2020-07-09 RX ADMIN — PAROXETINE HYDROCHLORIDE 10 MG: 10 TABLET, FILM COATED ORAL at 05:07

## 2020-07-09 RX ADMIN — PANTOPRAZOLE SODIUM 40 MG: 40 TABLET, DELAYED RELEASE ORAL at 12:07

## 2020-07-09 RX ADMIN — SPIRONOLACTONE 25 MG: 25 TABLET ORAL at 12:07

## 2020-07-09 RX ADMIN — Medication 10 ML: at 12:07

## 2020-07-09 RX ADMIN — ASPIRIN 81 MG: 81 TABLET, COATED ORAL at 09:07

## 2020-07-09 RX ADMIN — ALLOPURINOL 100 MG: 100 TABLET ORAL at 09:07

## 2020-07-09 RX ADMIN — CEFEPIME 1 G: 1 INJECTION, POWDER, FOR SOLUTION INTRAMUSCULAR; INTRAVENOUS at 03:07

## 2020-07-09 RX ADMIN — DOXAZOSIN 8 MG: 8 TABLET ORAL at 09:07

## 2020-07-09 RX ADMIN — MELATONIN 1000 UNITS: at 09:07

## 2020-07-09 RX ADMIN — TORSEMIDE 20 MG: 20 TABLET ORAL at 09:07

## 2020-07-09 RX ADMIN — Medication 10 ML: at 06:07

## 2020-07-09 RX ADMIN — ZOLPIDEM TARTRATE 10 MG: 5 TABLET ORAL at 09:07

## 2020-07-09 RX ADMIN — CEFEPIME 1 G: 1 INJECTION, POWDER, FOR SOLUTION INTRAMUSCULAR; INTRAVENOUS at 05:07

## 2020-07-09 RX ADMIN — POTASSIUM CHLORIDE 20 MEQ: 1500 TABLET, EXTENDED RELEASE ORAL at 09:07

## 2020-07-09 RX ADMIN — FERROUS GLUCONATE TAB 324 MG (37.5 MG ELEMENTAL IRON) 324 MG: 324 (37.5 FE) TAB at 12:07

## 2020-07-09 RX ADMIN — GUANFACINE 2 MG: 1 TABLET ORAL at 09:07

## 2020-07-09 RX ADMIN — COLCHICINE 0.6 MG: 0.6 TABLET, FILM COATED ORAL at 09:07

## 2020-07-09 RX ADMIN — AMIODARONE HYDROCHLORIDE 200 MG: 200 TABLET ORAL at 09:07

## 2020-07-09 RX ADMIN — WARFARIN SODIUM 5 MG: 5 TABLET ORAL at 04:07

## 2020-07-09 NOTE — PLAN OF CARE
Pt free of falls and injury during shift. POC reviewed with pt. VS stable. SR on telemetry. Increased BP, Lucas CHAMBERLAIN made aware. Pt refused to have lvad dressing changed. No acute events noted at this time. No complaints. Educated pt why they are at risk for falls and to use call light for assistance ambulating. Yellow non-slip socks on pt. Bed low and locked, call light with in reach. Will continue to monitor.

## 2020-07-09 NOTE — PROGRESS NOTES
07/08/20 2300   Vital Signs   BP (!) 90/0   BP Method Doppler   Lucas CHAMBERLAIN made aware. Pt does not want further interventions performed.

## 2020-07-09 NOTE — PROGRESS NOTES
UPDATE    SW to Pts room today ahead of ICD extraction planned for 7/10. Pt presents as AAO x4, calm, cooperative, and asking and answering questions appropriately, unaccompanied at bedside. Pt reports coping well at this time. Endorses reasonable nerves ahead of procedure but denies any coping concerns today. SW reviewed that home services remain in place for Pt ahead of discharge after extraction - dispo date unclear at this time. Pt denies questions or concerns at this time. SW providing ongoing psychosocial, counseling, & emotional support, education, resources, assistance, and discharge planning as indicated.      Notified by team that Pt may d/c as early as Sunday after ICD extraction. MARIA DEL CARMEN placed call to Kerri with [facility] ([phone]  F: [phone]) to advise of potential weekend d/c. Kerri to plan to see Pt tomorrow prior to ICD removal to provide teaching so as not to delay d/c. MARIA DEL CARMEN placed call to Emma with [facility] [phone]) to advise of potential weekend d/c and plan for tentative HH admit on Monday.     MARIA DEL CARMEN to continue to follow.

## 2020-07-09 NOTE — PROGRESS NOTES
[facility]  Heart Transplant  Progress Note    Patient Name: Tim Richards  MRN: [id number]  Admission Date: 7/1/2020  Hospital Length of Stay: 8 days  Attending Physician: Amparo Lopez MD  Primary Care Provider: Power Connors MD  Principal Problem:ICD (implantable cardioverter-defibrillator) infection    Subjective:     Interval History: Patient seen and examined today at bedside. Wound c/s positive for Enterobacter cloacae from 7/2. Blood c/s from 7/2 and 7/3 negative.  ID f/u appreciated- DC Vancomycin, changed to IV cefepime, continue 2 weeks post device extraction. PICC on 7/6/2020.  EP - plan for ICD removal on Friday, CTS consulted for input and back up plan. EP follow up appreciated. Life vest on discharge.  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   LDH in 640 -> 585 -> 492 -> 474-> 416. Received 250 ml fluids on 7/4. Repeat TTE at 9800 with LV 9.5 cm, suggestion of a mobile mass on a lead in the atrium. LFT normal.  Will c/w coumadin. INR1.9 today.  Increase guafacine to 2 mg qhs.  Pt reluctant to have speed increase as he had symptoms in the past with speed change, no active symptoms. Will hold speed change for now.  Also he prefers not to uptitrate his BP medications as he is anxious and would like not to increase medications at this time. Informed risks of uncontrolled BP, pt aware of risks.   Gout- s/w colchicine improved.      Continuous Infusions:  Scheduled Meds:   allopurinoL  100 mg Oral Daily    amiodarone  200 mg Oral Daily    amLODIPine  10 mg Oral Daily    aspirin  81 mg Oral Daily    ceFEPime (MAXIPIME) IVPB  1 g Intravenous Q8H    colchicine  0.6 mg Oral Daily    doxazosin  8 mg Oral Q12H    ferrous gluconate  324 mg Oral Daily with lunch    fluticasone propionate  2 spray Each Nostril Daily    guanFACINE  2 mg Oral QHS    pantoprazole  40 mg  Oral Daily with lunch    paroxetine  10 mg Oral QAM    potassium chloride SA  20 mEq Oral BID    predniSONE  2.5 mg Oral Daily    sodium chloride 0.9%  10 mL Intravenous Q6H    spironolactone  25 mg Oral Daily with lunch    torsemide  20 mg Oral Daily    vitamin D  1,000 Units Oral Daily    warfarin  5 mg Oral Daily     PRN Meds:acetaminophen, polyethylene glycol, senna-docusate 8.6-50 mg, Flushing PICC Protocol **AND** sodium chloride 0.9% **AND** sodium chloride 0.9%, zolpidem    Review of patient's allergies indicates:   Allergen Reactions    Lisinopril Anaphylaxis    Hydralazine analogues      Chronic constipation, impotence, dizziness     Objective:     Vital Signs (Most Recent):  Temp: 99 °F (37.2 °C) (07/09/20 0800)  Pulse: 68 (07/09/20 1111)  Resp: 14 (07/09/20 0800)  BP: (!) 90/0 (07/09/20 0800)  SpO2: 98 % (07/09/20 0800) Vital Signs (24h Range):  Temp:  [96.1 °F (35.6 °C)-99 °F (37.2 °C)] 99 °F (37.2 °C)  Pulse:  [61-82] 68  Resp:  [14-18] 14  SpO2:  [94 %-99 %] 98 %  BP: ()/(0-67) 90/0     Patient Vitals for the past 72 hrs (Last 3 readings):   Weight   07/09/20 0800 121.5 kg (267 lb 13.7 oz)   07/08/20 0800 121.6 kg (268 lb 1.3 oz)     Body mass index is 35.34 kg/m².      Intake/Output Summary (Last 24 hours) at 7/9/2020 1120  Last data filed at 7/9/2020 1100  Gross per 24 hour   Intake 480 ml   Output 600 ml   Net -120 ml       Hemodynamic Parameters:       Telemetry: VAD artifact    Physical Exam  Vitals signs and nursing note reviewed.   Constitutional:       Appearance: Normal appearance.   HENT:      Head: Normocephalic and atraumatic.      Nose: No congestion.      Mouth/Throat:      Mouth: Mucous membranes are moist.   Eyes:      Extraocular Movements: Extraocular movements intact.      Pupils: Pupils are equal, round, and reactive to light.   Neck:      Musculoskeletal: Normal range of motion.   Cardiovascular:      Rate and Rhythm: Normal rate and regular rhythm.      Comments:  Smooth VAD hum  Pulmonary:      Effort: Pulmonary effort is normal. No respiratory distress.      Breath sounds: Normal breath sounds. No rales.   Abdominal:      General: Abdomen is flat. Bowel sounds are normal. There is no distension.      Tenderness: There is no abdominal tenderness. There is no guarding.   Musculoskeletal:         General: No swelling.   Skin:     General: Skin is warm.      Comments: ICD site: Redness, tenderness and swelling. Dressing in place.    Neurological:      General: No focal deficit present.      Mental Status: He is alert.   Psychiatric:         Mood and Affect: Mood normal.         Significant Labs:  CBC:  Recent Labs   Lab 07/07/20  0545 07/08/20  0511 07/09/20  0605   WBC 6.83 5.89 5.73   RBC 4.64 4.48* 4.21*   HGB 12.4* 12.2* 11.4*   HCT 40.5 39.6* 36.8*    219 214   MCV 87 88 87   MCH 26.7* 27.2 27.1   MCHC 30.6* 30.8* 31.0*     BNP:  Recent Labs   Lab 07/03/20  0709 07/06/20  0702 07/08/20  0511   BNP 97 124* 74     CMP:  Recent Labs   Lab 07/03/20  0709  07/06/20  0701 07/07/20  0545 07/08/20  0511 07/09/20  0605   GLU  --    < > 89 79 84 85   CALCIUM  --    < > 9.3 9.4 9.3 9.0   ALBUMIN 3.5  --  3.4*  --  3.5  --    PROT 7.2  --  7.1  --  7.4  --    NA  --    < > 135* 137 136 135*   K  --    < > 4.1 4.1 3.7 4.3   CO2  --    < > 23 27 24 24   CL  --    < > 102 103 102 105   BUN  --    < > 15 13 14 12   CREATININE  --    < > 1.6* 1.6* 1.7* 1.5*   ALKPHOS 94  --  98  --  94  --    ALT 19  --  21  --  23  --    AST 27  --  24  --  30  --    BILITOT 0.4  --  0.4  --  0.5  --     < > = values in this interval not displayed.      Coagulation:   Recent Labs   Lab 07/07/20  0545 07/08/20  0511 07/09/20  0605   INR 2.2* 2.1* 1.9*   APTT 34.9* 34.8* 34.9*     LDH:  Recent Labs   Lab 07/07/20  0545 07/08/20 0511 07/09/20 0605   * 445* 416*     Microbiology:  Microbiology Results (last 7 days)     Procedure Component Value Units Date/Time    Culture, Anaerobe [[id number]]  Collected: 07/01/20 1741    Order Status: Completed Specimen: Incision site from Chest, Left Updated: 07/09/20 0906     Anaerobic Culture No anaerobes isolated    Culture, Anaerobe [[id number]] Collected: 07/01/20 1741    Order Status: Completed Specimen: Skin from Abdomen Updated: 07/09/20 0742     Anaerobic Culture No anaerobes isolated    Narrative:      Drive line site    Blood culture [[id number]] Collected: 07/03/20 0712    Order Status: Completed Specimen: Blood Updated: 07/08/20 0812     Blood Culture, Routine No growth after 5 days.    Blood culture [[id number]] Collected: 07/03/20 0711    Order Status: Completed Specimen: Blood Updated: 07/08/20 0812     Blood Culture, Routine No growth after 5 days.    Blood culture [[id number]] Collected: 07/02/20 1110    Order Status: Completed Specimen: Blood Updated: 07/07/20 1412     Blood Culture, Routine No growth after 5 days.    Culture, Anaerobe [[id number]]  (Abnormal) Collected: 07/01/20 2153    Order Status: Completed Specimen: Wound from Abdomen Updated: 07/07/20 1316     Anaerobic Culture FUSOBACTERIUM NUCLEATUM  Moderate        PREVOTELLA (B.) MELANINOGENICA  Moderate      Narrative:      Left chest incision.    Aerobic culture [[id number]]  (Abnormal)  (Susceptibility) Collected: 07/01/20 2153    Order Status: Completed Specimen: Wound from Abdomen Updated: 07/04/20 1214     Aerobic Bacterial Culture ENTEROBACTER CLOACAE  Moderate  No other significant isolate      Narrative:      Left chest incision    Aerobic culture [[id number]] Collected: 07/01/20 1741    Order Status: Completed Specimen: Skin from Abdomen Updated: 07/03/20 1203     Aerobic Bacterial Culture No significant isolate          BMP:   Recent Labs   Lab 07/09/20  0605   GLU 85   *   K 4.3      CO2 24   BUN 12   CREATININE 1.5*   CALCIUM 9.0   MG 2.1     Cardiac Markers: No results for input(s): CKMB, TROPONINT, MYOGLOBIN in the last 72 hours.  Coagulation:   Recent Labs   Lab  07/09/20  0605   INR 1.9*   APTT 34.9*     I have reviewed all pertinent labs within the past 24 hours.    Estimated Creatinine Clearance: 77.7 mL/min (A) (based on SCr of 1.5 mg/dL (H)).    Diagnostic Results:  I have reviewed all pertinent imaging results/findings within the past 24 hours.    Assessment and Plan:     53 yo AAM with DCM, HBP, CHF stage D s/p HM 2, ICD lead replacement 03/09/2020, hyperthyroidism,  sent in from clinic with a ICD pocket site infection. Patient wife reports yellow pus oozing from site this past week. Was prescribed antibiotics on Monday 6/29/20, and advised to come get admitted that day, but patient reported he didn't think it was necessary at that time. Denies fever, but reports intermittent nausea and chills, and Left side of Left hand tingling.      VAD parameters at baseline.  Pt speed decreased to 9800 rpms from 10,000 on 06/12/2020    ICD device check - 07/01/2020 --. Device interrogation revealed HV x 1 on 6/27/20 @ 3:41 pm for MMVT, Type 2 break.,CL-188 bpm. Patient reported sitting in car bending over when it occurred, not doing anything strenuous. Patient denies LOC. Patient reports overall feeling palpitations, tired, no energy, and COLEMAN, even prior to HV therapy.          * ICD (implantable cardioverter-defibrillator) infection  - presented with device pocket infection  - has been on oral doxycycline at home for the last 1 week with no improvement  - denies any fever but does admit to fatigue and chills   - labs pending   - ICD device check - 07/01/2020 --. Device interrogation revealed HV x 1 on 6/27/20 @ 3:41 pm for MMVT, Type 2 break.,CL-188 bpm.    Plan   - ID consulted who recommended IV vancomycin and IV cefepime. On 7/4 DC Vanco, c/w cefepime. Wound c/s positive for Enterobacter cloacae. Blood c/s from 7/2 and 7/3 negative.  ID f/u appreciated- DC Vancomycin, changed to IV cefepime, continue 2 weeks post device extraction. Will need PICC on DC.  EP - plan for ICD  removal, CTS consulted for input and back up plan. EP follow up appreciated.  - will f/u blood and site cultures and based on the sensitivities will narrow the antibitoics  - EP informed who will plan an outpatient generator change and pocket site debridement.  - patient will stay over the weekend o get IV antibiotics and get the final results of his culture.       Gout  - Uric acid  - S/W colchicine and allopurinol continued    Elevated LDH  LDH in 640 -> 585 -> 492 -> 474 -> 418 after  250 ml fluids, reduced torsemide. BNP 97. Repeat TTE. LFT normal.  Will c/w coumadin. INR 2.2 today. Coumadin 7.5 mg. Given risk of GI bleed, patient not a candidate for heparin bridge. Also given concern risk of bleeding, hold off on starting persantine. C/W ASA 81 mg daily. Likely LDH elevation as an acute phase reactant from infection.         VT (ventricular tachycardia)  - device check showed a VT episode with shock on 06/27  - patient denies any LOC   - c/w amiodarone 200 mg daily.   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 Trend 640 -> 585 -> 474 -> 445 -> 416. Received 250 ml fluids on 7/4. . LFT normal.  Will c/w coumadin. INR 1.9. Coumadin 5 mg. Given risk of GI bleed, patient not a candidate for heparin bridge. Also given concern risk of bleeding, hold off on starting persantine. C/W ASA 81 mg daily.  -Pt reluctant to have speed increase as he had symptoms in the past with speed change, no active symptoms. Will hold speed change for now.  Also he prefers not to uptitrate his BP medications as he is anxious and would like not to increase medications at this time. Informed risks of uncontrolled BP, pt aware of risks.   - INR 1.9 Goal of 2-3   - c/w coumadin  - patient denies any dark urine and no obvious scleral icterus on exam  - drive site clean and dry but will send site  cultures.    Procedure: Device Interrogation Including analysis of device parameters  Current Settings: Ventricular Assist Device  Review of device function is stable/unstable stable    TXP LVAD INTERROGATIONS 7/9/2020 7/9/2020 7/8/2020 7/8/2020 7/8/2020 7/8/2020 7/8/2020   Type HeartMate II HeartMate II HeartMate II HeartMate II HeartMate II HeartMate II HeartMate II   Flow 5.7 5.4 5.0 4.9 4.9 5.6 5.3   Speed 9800 9800 9800 9800 9800 9800 9800   PI 3.7 4.0 4.0 3.7 3.4 3.7 3.1   Power (Jackson) 6.6 6.2 6.1 6.1 6.1 6.4 6.4   LSL 9400 - - - 9400 9400 9400   Pulsatility Intermittent pulse - - - Intermittent pulse Intermittent pulse Intermittent pulse       NICM (nonischemic cardiomyopathy)  - 7/4 bolus fluids. Reduce Torsemide to 20 mg daily.   - keep K > 4 and Mg >2    Seen and d/w Dr. John Hopkins MD  Heart Transplant  [facility]

## 2020-07-09 NOTE — SUBJECTIVE & OBJECTIVE
Interval History: Patient seen and examined today at bedside. Wound c/s positive for Enterobacter cloacae from 7/2. Blood c/s from 7/2 and 7/3 negative.  ID f/u appreciated- DC Vancomycin, changed to IV cefepime, continue 2 weeks post device extraction. PICC on 7/6/2020.  EP - plan for ICD removal on Friday, CTS consulted for input and back up plan. EP follow up appreciated. Life vest on discharge.  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   LDH in 640 -> 585 -> 492 -> 474-> 416. Received 250 ml fluids on 7/4. Repeat TTE at 9800 with LV 9.5 cm, suggestion of a mobile mass on a lead in the atrium. LFT normal.  Will c/w coumadin. INR1.9 today.  Increase guafacine to 2 mg qhs.  Pt reluctant to have speed increase as he had symptoms in the past with speed change, no active symptoms. Will hold speed change for now.  Also he prefers not to uptitrate his BP medications as he is anxious and would like not to increase medications at this time. Informed risks of uncontrolled BP, pt aware of risks.   Gout- s/w colchicine improved.      Continuous Infusions:  Scheduled Meds:   allopurinoL  100 mg Oral Daily    amiodarone  200 mg Oral Daily    amLODIPine  10 mg Oral Daily    aspirin  81 mg Oral Daily    ceFEPime (MAXIPIME) IVPB  1 g Intravenous Q8H    colchicine  0.6 mg Oral Daily    doxazosin  8 mg Oral Q12H    ferrous gluconate  324 mg Oral Daily with lunch    fluticasone propionate  2 spray Each Nostril Daily    guanFACINE  2 mg Oral QHS    pantoprazole  40 mg Oral Daily with lunch    paroxetine  10 mg Oral QAM    potassium chloride SA  20 mEq Oral BID    predniSONE  2.5 mg Oral Daily    sodium chloride 0.9%  10 mL Intravenous Q6H    spironolactone  25 mg Oral Daily with lunch    torsemide  20 mg Oral Daily    vitamin D  1,000 Units Oral Daily    warfarin  5 mg Oral Daily     PRN Meds:acetaminophen,  polyethylene glycol, senna-docusate 8.6-50 mg, Flushing PICC Protocol **AND** sodium chloride 0.9% **AND** sodium chloride 0.9%, zolpidem    Review of patient's allergies indicates:   Allergen Reactions    Lisinopril Anaphylaxis    Hydralazine analogues      Chronic constipation, impotence, dizziness     Objective:     Vital Signs (Most Recent):  Temp: 99 °F (37.2 °C) (07/09/20 0800)  Pulse: 68 (07/09/20 1111)  Resp: 14 (07/09/20 0800)  BP: (!) 90/0 (07/09/20 0800)  SpO2: 98 % (07/09/20 0800) Vital Signs (24h Range):  Temp:  [96.1 °F (35.6 °C)-99 °F (37.2 °C)] 99 °F (37.2 °C)  Pulse:  [61-82] 68  Resp:  [14-18] 14  SpO2:  [94 %-99 %] 98 %  BP: ()/(0-67) 90/0     Patient Vitals for the past 72 hrs (Last 3 readings):   Weight   07/09/20 0800 121.5 kg (267 lb 13.7 oz)   07/08/20 0800 121.6 kg (268 lb 1.3 oz)     Body mass index is 35.34 kg/m².      Intake/Output Summary (Last 24 hours) at 7/9/2020 1120  Last data filed at 7/9/2020 1100  Gross per 24 hour   Intake 480 ml   Output 600 ml   Net -120 ml       Hemodynamic Parameters:       Telemetry: VAD artifact    Physical Exam  Vitals signs and nursing note reviewed.   Constitutional:       Appearance: Normal appearance.   HENT:      Head: Normocephalic and atraumatic.      Nose: No congestion.      Mouth/Throat:      Mouth: Mucous membranes are moist.   Eyes:      Extraocular Movements: Extraocular movements intact.      Pupils: Pupils are equal, round, and reactive to light.   Neck:      Musculoskeletal: Normal range of motion.   Cardiovascular:      Rate and Rhythm: Normal rate and regular rhythm.      Comments: Smooth VAD hum  Pulmonary:      Effort: Pulmonary effort is normal. No respiratory distress.      Breath sounds: Normal breath sounds. No rales.   Abdominal:      General: Abdomen is flat. Bowel sounds are normal. There is no distension.      Tenderness: There is no abdominal tenderness. There is no guarding.   Musculoskeletal:         General: No  swelling.   Skin:     General: Skin is warm.      Comments: ICD site: Redness, tenderness and swelling. Dressing in place.    Neurological:      General: No focal deficit present.      Mental Status: He is alert.   Psychiatric:         Mood and Affect: Mood normal.         Significant Labs:  CBC:  Recent Labs   Lab 07/07/20 0545 07/08/20 0511 07/09/20 0605   WBC 6.83 5.89 5.73   RBC 4.64 4.48* 4.21*   HGB 12.4* 12.2* 11.4*   HCT 40.5 39.6* 36.8*    219 214   MCV 87 88 87   MCH 26.7* 27.2 27.1   MCHC 30.6* 30.8* 31.0*     BNP:  Recent Labs   Lab 07/03/20 0709 07/06/20  0702 07/08/20 0511   BNP 97 124* 74     CMP:  Recent Labs   Lab 07/03/20 0709 07/06/20  0701 07/07/20 0545 07/08/20 0511 07/09/20 0605   GLU  --    < > 89 79 84 85   CALCIUM  --    < > 9.3 9.4 9.3 9.0   ALBUMIN 3.5  --  3.4*  --  3.5  --    PROT 7.2  --  7.1  --  7.4  --    NA  --    < > 135* 137 136 135*   K  --    < > 4.1 4.1 3.7 4.3   CO2  --    < > 23 27 24 24   CL  --    < > 102 103 102 105   BUN  --    < > 15 13 14 12   CREATININE  --    < > 1.6* 1.6* 1.7* 1.5*   ALKPHOS 94  --  98  --  94  --    ALT 19  --  21  --  23  --    AST 27  --  24  --  30  --    BILITOT 0.4  --  0.4  --  0.5  --     < > = values in this interval not displayed.      Coagulation:   Recent Labs   Lab 07/07/20 0545 07/08/20 0511 07/09/20 0605   INR 2.2* 2.1* 1.9*   APTT 34.9* 34.8* 34.9*     LDH:  Recent Labs   Lab 07/07/20  0545 07/08/20  0511 07/09/20  0605   * 445* 416*     Microbiology:  Microbiology Results (last 7 days)     Procedure Component Value Units Date/Time    Culture, Anaerobe [[id number]] Collected: 07/01/20 1741    Order Status: Completed Specimen: Incision site from Chest, Left Updated: 07/09/20 0906     Anaerobic Culture No anaerobes isolated    Culture, Anaerobe [[id number]] Collected: 07/01/20 1741    Order Status: Completed Specimen: Skin from Abdomen Updated: 07/09/20 0742     Anaerobic Culture No anaerobes isolated     Narrative:      Drive line site    Blood culture [[id number]] Collected: 07/03/20 0712    Order Status: Completed Specimen: Blood Updated: 07/08/20 0812     Blood Culture, Routine No growth after 5 days.    Blood culture [[id number]] Collected: 07/03/20 0711    Order Status: Completed Specimen: Blood Updated: 07/08/20 0812     Blood Culture, Routine No growth after 5 days.    Blood culture [[id number]] Collected: 07/02/20 1110    Order Status: Completed Specimen: Blood Updated: 07/07/20 1412     Blood Culture, Routine No growth after 5 days.    Culture, Anaerobe [[id number]]  (Abnormal) Collected: 07/01/20 2153    Order Status: Completed Specimen: Wound from Abdomen Updated: 07/07/20 1316     Anaerobic Culture FUSOBACTERIUM NUCLEATUM  Moderate        PREVOTELLA (B.) MELANINOGENICA  Moderate      Narrative:      Left chest incision.    Aerobic culture [[id number]]  (Abnormal)  (Susceptibility) Collected: 07/01/20 2153    Order Status: Completed Specimen: Wound from Abdomen Updated: 07/04/20 1214     Aerobic Bacterial Culture ENTEROBACTER CLOACAE  Moderate  No other significant isolate      Narrative:      Left chest incision    Aerobic culture [[id number]] Collected: 07/01/20 1741    Order Status: Completed Specimen: Skin from Abdomen Updated: 07/03/20 1203     Aerobic Bacterial Culture No significant isolate          BMP:   Recent Labs   Lab 07/09/20  0605   GLU 85   *   K 4.3      CO2 24   BUN 12   CREATININE 1.5*   CALCIUM 9.0   MG 2.1     Cardiac Markers: No results for input(s): CKMB, TROPONINT, MYOGLOBIN in the last 72 hours.  Coagulation:   Recent Labs   Lab 07/09/20  0605   INR 1.9*   APTT 34.9*     I have reviewed all pertinent labs within the past 24 hours.    Estimated Creatinine Clearance: 77.7 mL/min (A) (based on SCr of 1.5 mg/dL (H)).    Diagnostic Results:  I have reviewed all pertinent imaging results/findings within the past 24 hours.

## 2020-07-09 NOTE — PROGRESS NOTES
07/09/2020  Fitz Christianson    Current provider:  Amparo Lopez MD      I, Fitz Christianson, rounded on Tim Richards to ensure all mechanical assist device settings (IABP or VAD) were appropriate and all parameters were within limits.  I was able to ensure all back up equipment was present, the staff had no issues, and the Perfusion Department daily rounding was complete.    8:09 AM

## 2020-07-09 NOTE — ASSESSMENT & PLAN NOTE
BiV-Icd [employer] (2014 Dr. Chiu), successful DFT at 35J on 10/18/2019. Multiple VT episodes,  generator change (BiV ICD -> dual ICD),  device revision (addition of a new RV/ICD lead) and another VF induction via ICD on 3/2020. Presented from device clinic with device erosion.     - Echo done 7/5 Eccentric left ventricular hypertrophy. Severely decreased left ventricular systolic function. EF 10%. LVAD present. Base speed is 9800 RPMs. Evidence suggesting mobile mass on RA lead  - Blood cultured negative to date  - Wound cultures growing Enterobacter.  - Antibiotic regimen per ID recommendations. Switched to Cefepime q8h.  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   - CT surgery consulted for evaluation and back up plan recommendations regarding device extraction. Given prior surgical history no surgical back up will be present at lead removal.  - Device extraction scheduled for Friday 7/10/20 with Dr. Yun   - ordered COVID testing   - will need left peripheral access   - INR 1.9 today. On coumadin 5 mg. Given risk of GI bleed, patient not a candidate for heparin bridge. Recommend keep INR as low as is safe per CT sx/HTS.

## 2020-07-09 NOTE — PLAN OF CARE
Pt is a 53 yo LVAD who was admitted for ICD Site Infection, pt to undergo ICD removal Friday 7/10/2020. Pt is AAOx 4, and independent. Pt is calm and cooperative. Fall precautions in place -- SR x2, call light in reach, bed in lowest position. Free of falls, traumas, and injury. Pt educated on medication compliance and s/s of high blood pressure. Pt has DLES RLQ abdomen (changed every other day -- performed last night by patient 7/8/2020) and ICD Infection site LUQ (ICD to be removed tomorrow 7/9/2020). Pt demonstrates and verbalizes understanding. VSS on RA. Doppler's have been stable, maintaining 90/0 throughout the day. Left peripheral IV started per Dr. Hopkins order. Pt is being monitored in Lead II and V1 and is in NSR with LVAD Artifact. Pt denies pain. Pt denies concerns about surgery for tomorrow. Plan of care reviewed with pt. Questions encouraged, and answered.

## 2020-07-09 NOTE — SUBJECTIVE & OBJECTIVE
Interval History:   Afebrile, no leukocytosis, Hb stable.     HTS team following ? PT did not want to increase LVAD speed as recommend by HTS. Continues on 9800 Rpm. Bp was elevated however patient does not want additional medication. FeelS it is attributed to high stess environement    INR 1.9 on warfarin 5mg    Cr 1.5 - Stable    Telemetry showing occasional PVCs    ROS  Objective:     Vital Signs (Most Recent):  Temp: 99 °F (37.2 °C) (07/09/20 0800)  Pulse: 75 (07/09/20 0800)  Resp: 14 (07/09/20 0800)  BP: (!) 90/0 (07/09/20 0800)  SpO2: 98 % (07/09/20 0800) Vital Signs (24h Range):  Temp:  [96.1 °F (35.6 °C)-99 °F (37.2 °C)] 99 °F (37.2 °C)  Pulse:  [61-82] 75  Resp:  [14-18] 14  SpO2:  [94 %-99 %] 98 %  BP: ()/(0-67) 90/0     Weight: 121.5 kg (267 lb 13.7 oz)  Body mass index is 35.34 kg/m².     SpO2: 98 %  O2 Device (Oxygen Therapy): room air    Physical Exam   Constitutional: He is oriented to person, place, and time. He appears well-developed and well-nourished. No distress.   HENT:   Head: Normocephalic.   Left Ear: External ear normal.   Eyes: Pupils are equal, round, and reactive to light. Right eye exhibits no discharge. Left eye exhibits no discharge.   Neck: Normal range of motion. No thyromegaly present.   Cardiovascular: Normal rate and regular rhythm. Exam reveals no friction rub.   Murmur heard.  Smooth VAD hum   Pulmonary/Chest: Effort normal and breath sounds normal. No respiratory distress.   Abdominal: Soft. Bowel sounds are normal. He exhibits no distension. There is no abdominal tenderness.   Musculoskeletal: Normal range of motion.         General: No edema.   Neurological: He is alert and oriented to person, place, and time.   Skin: Skin is warm and dry. No rash noted. No erythema.            Significant Labs:   Blood Culture: No results for input(s): LABBLOO in the last 48 hours., CMP:   Recent Labs   Lab 07/08/20  0511 07/09/20  0605    135*   K 3.7 4.3    105   CO2 24  24   GLU 84 85   BUN 14 12   CREATININE 1.7* 1.5*   CALCIUM 9.3 9.0   PROT 7.4  --    ALBUMIN 3.5  --    BILITOT 0.5  --    ALKPHOS 94  --    AST 30  --    ALT 23  --    ANIONGAP 10 6*   ESTGFRAFRICA 52.1* >60.0   EGFRNONAA 45.0* 52.4*   , CBC:   Recent Labs   Lab 07/08/20  0511 07/09/20  0605   WBC 5.89 5.73   HGB 12.2* 11.4*   HCT 39.6* 36.8*    214   , INR:   Recent Labs   Lab 07/08/20  0511 07/09/20  0605   INR 2.1* 1.9*    and All pertinent lab results from the last 24 hours have been reviewed.    Significant Imaging: Echocardiogram:   Transthoracic echo (TTE) complete (Cupid Only):   Results for orders placed or performed during the hospital encounter of 07/01/20   Echo Color Flow Doppler? Yes   Result Value Ref Range    Ascending aorta 3.34 cm    STJ 2.96 cm    IVS 1.04 0.6 - 1.1 cm    LA size 3.90 cm    Left Atrium Major Axis 6.81 cm    Left Atrium Minor Axis 6.81 cm    LVIDD 9.50 (A) 3.5 - 6.0 cm    LVIDS 9.37 (A) 2.1 - 4.0 cm    LVOT diameter 2.49 cm    PW 1.34 (A) 0.6 - 1.1 cm    MV Peak A Jorge 0.49 m/s    E wave decelartion time 112.55 msec    MV Peak E Jorge 0.78 m/s    RA Major Axis 5.76 cm    RA Width 4.15 cm    Sinus 3.16 cm    TR Max Jorge 2.09 m/s    TDI LATERAL 0.10 m/s    TDI SEPTAL 0.08 m/s    LA WIDTH 4.84 cm    MV stenosis pressure 1/2 time 32.64 ms    LV Diastolic Volume 547.08 mL    LV Systolic Volume 489.59 mL    RV S' 10.73 cm/s    LV LATERAL E/E' RATIO 7.80 m/s    LV SEPTAL E/E' RATIO 9.75 m/s    FS 1 %    LA volume 109.26 cm3    LV mass 682.26 g    Left Ventricle Relative Wall Thickness 0.28 cm    MV valve area p 1/2 method 6.74 cm2    E/A ratio 1.59     Mean e' 0.09 m/s    LVOT area 4.9 cm2    E/E' ratio 8.67 m/s    LV Systolic Volume Index 201.6 mL/m2    LV Diastolic Volume Index 225.22 mL/m2    LA Volume Index 45.0 mL/m2    LV Mass Index 281 g/m2    Triscuspid Valve Regurgitation Peak Gradient 17 mmHg    BSA 2.49 m2    Narrative    · Eccentric left ventricular hypertrophy. Severely  decreased left   ventricular systolic function. The estimated ejection fraction is 10%.  · Grade I (mild) left ventricular diastolic dysfunction consistent with   impaired relaxation.  · Moderate left atrial enlargement.  · Small posterior pericardial effusion.  · Mild mitral regurgitation.  · Mild tricuspid regurgitation.  · LVAD present. Base speed is 9800 RPMs. The pump type is a Heartmate II.   The interventricular septum appears midline. LVEDD 9.5cm. Unable to   comment on AV  · RV not well seen. Appears normal in size on limited images.     There is a suggestion of a mobile mass on a lead in the right atrium   (image 47)

## 2020-07-09 NOTE — ANESTHESIA PREPROCEDURE EVALUATION
[facility]  Anesthesia Pre-Operative Evaluation         Patient Name: Tim Richards  YOB: 1965  MRN: [id number]    SUBJECTIVE:     Pre-operative evaluation for Procedure(s) (LRB):  EXTRACTION, ELECTRODE LEAD (N/A)  ECHOCARDIOGRAM,TRANSESOPHAGEAL (N/A)     07/09/2020    Tim Richards is a 54 y.o. male w/ a significant PMHx of DCM, CHF stage D s/p LVAD HM2 - EF 10% (DT as of now), ICD lead replacement 03/09/2020, VT/VF s/p ICD in 2014 (medtronic crt-d), HTN, obesity, and hypothyroidism (amiodarone induced), and VT/VF s/p medtronic crt-d (2014, Dr Chiu) who presented 7/1/20 from clinic with infected ICD pocket infection. Wound cxs growing Enterobacter cloacae 7/2/20. Now on cefepime. Currently being anticoagulated with warfarin. Plan for ICD removal 7/10/20.     7/9/2020  H/H 11.4/36.8  INR 1.9  Cr 1.5    EF 10%. LVAD present. Base speed is 9800 RPMs. The pump type is a Heartmate II. The interventricular septum appears midline. LVEDD 9.5cm.     LDA:   PICC Double Lumen 07/06/20 1642 right basilic 2 days       Prev airway:   3/8/2018  Present Prior to Hospital Arrival?: Yes; Placement Date: 03/08/18; Placement Time: 0824 (created via procedure documentation); Method of Intubation: Fiberoptic Intubation (Placed Airq LMA prior to fiberoptic intubation. Confirmed ventilation. Then placed 8.0 ETT w/ fiberoptic scope through AirQ without difficulty. ); Inserted by: Anesthesia Resident; Airway Device: Endotracheal Tube; Mask Ventilation: Other; Intubated: Postinduction; Airway Device Size: 8.0; Style: Cuffed; Cuff Inflation: Minimal occlusive pressure; Placement Verified By: Fiberoptic bronchoscopic inspection, Capnometry, Auscultation, ETT Condensation; Grade: Grade I; Complicating Factors: Anterior larynx; Intubation Findings: Positive EtCO2, Bilateral breath sounds, Atraumatic/Condition of teeth unchanged;  Depth of Insertion: 25.5; Securment: Lips;  Complications: None; Breath Sounds: Equal Bilateral; Insertion Attempts: 1; Removal Date: 03/11/18;  Removal Time: 0945; Removal Indication & Assessment: removed per order; Name of Person who Removed: BERTO Arita CRT.     Drips: None documented.    Patient Active Problem List   Diagnosis    Cigarette nicotine dependence without complication    Alcohol abuse    Pulmonary nodule seen on imaging study    Biventricular implantable cardioverter-defibrillator in situ    Chronic combined systolic and diastolic heart failure    High risk medications (amiodarone) long-term use    NICM (nonischemic cardiomyopathy)    CKD (chronic kidney disease), stage III    Hypertensive cardiovascular-renal disease, stage 1-4 or unspecified chronic kidney disease, with heart failure    LVAD (left ventricular assist device) present    Hyperglycemia    Hyperbilirubinemia    Anemia    Hypertension    Long term (current) use of anticoagulants    Left ventricular assist device (LVAD) complication    Pre-transplant evaluation for heart transplant    SOB (shortness of breath)    GIB (gastrointestinal bleeding)    Leukocytosis    Bloody diarrhea    Thrombocytopenia, unspecified    GI bleed    History of bleeding ulcers    Shortness of breath    VT (ventricular tachycardia)    Severe obesity (BMI 35.0-39.9) with comorbidity    Palpitations    Amiodarone-induced hyperthyroidism    Heart replaced by heart assist device    Presence of left ventricular assist device (LVAD)    Hyperthyroidism    Substance or medication-induced sleep disorder, insomnia type    VF (ventricular fibrillation)    Elevated serum lactate dehydrogenase (LDH)    Essential hypertension    CHICHI (obstructive sleep apnea)    ICD (implantable cardioverter-defibrillator) infection    Infection involving implantable cardioverter-defibrillator (ICD)    Elevated LDH    Other specified hypothyroidism    Gout       Review of patient's allergies  indicates:   Allergen Reactions    Lisinopril Anaphylaxis    Hydralazine analogues      Chronic constipation, impotence, dizziness       Current Inpatient Medications:   allopurinoL  100 mg Oral Daily    amiodarone  200 mg Oral Daily    amLODIPine  10 mg Oral Daily    aspirin  81 mg Oral Daily    ceFEPime (MAXIPIME) IVPB  1 g Intravenous Q8H    colchicine  0.6 mg Oral Daily    doxazosin  8 mg Oral Q12H    ferrous gluconate  324 mg Oral Daily with lunch    fluticasone propionate  2 spray Each Nostril Daily    guanFACINE  2 mg Oral QHS    pantoprazole  40 mg Oral Daily with lunch    paroxetine  10 mg Oral QAM    potassium chloride SA  20 mEq Oral BID    predniSONE  2.5 mg Oral Daily    sodium chloride 0.9%  10 mL Intravenous Q6H    spironolactone  25 mg Oral Daily with lunch    torsemide  20 mg Oral Daily    vitamin D  1,000 Units Oral Daily    warfarin  5 mg Oral Daily       No current facility-administered medications on file prior to encounter.      Current Outpatient Medications on File Prior to Encounter   Medication Sig Dispense Refill    allopurinol (ZYLOPRIM) 100 MG tablet TAKE 1 TABLET BY MOUTH EVERY DAY (Patient taking differently: Take 100 mg by mouth nightly. ) 30 tablet 5    amiodarone (PACERONE) 400 MG tablet Take 1 tablet (400 mg total) by mouth once daily. (Patient taking differently: Take 200 mg by mouth once daily. ) 30 tablet 11    amLODIPine (NORVASC) 10 MG tablet Take 1 tablet (10 mg total) by mouth once daily. 30 tablet 11    doxazosin (CARDURA) 8 MG Tab Take 1 tablet (8 mg total) by mouth every 12 (twelve) hours. 60 tablet 11    doxycycline (VIBRAMYCIN) 100 MG Cap Take 1 capsule (100 mg total) by mouth 2 (two) times daily. for 10 days 20 capsule 0    ferrous gluconate 324 mg (37.5 mg iron) Tab tablet Take 1 tablet (324 mg total) by mouth daily with breakfast. (Patient taking differently: Take 324 mg by mouth daily with lunch. ) 30 tablet 11    guanFACINE (TENEX) 1 MG  Tab Take 1 tablet (1 mg total) by mouth every evening. 30 tablet 5    pantoprazole (PROTONIX) 40 MG tablet TAKE 1 TABLET (40 MG TOTAL) BY MOUTH ONCE DAILY. (Patient taking differently: Take 40 mg by mouth daily with lunch. ) 90 tablet 3    paroxetine (PAXIL) 10 MG tablet Take 1 tablet (10 mg total) by mouth every morning. 30 tablet 3    potassium chloride SA (K-DUR,KLOR-CON) 20 MEQ tablet Take 20 mEq by mouth 2 (two) times daily.      predniSONE (DELTASONE) 2.5 MG tablet Take 3 tablets (7.5 mg total) by mouth once daily for 10 days, THEN 2 tablets (5 mg total) once daily for 10 days, THEN 1 tablet (2.5 mg total) once daily for 15 days. Then discontinue.. 65 tablet 0    spironolactone (ALDACTONE) 25 MG tablet Take 1 tablet (25 mg total) by mouth once daily. (Patient taking differently: Take 25 mg by mouth daily with lunch. ) 30 tablet 11    torsemide (DEMADEX) 20 MG Tab TAKE 2 TABS BY MOUTH EVERY MORNING AND 1 TAB EVERY EVENING FOR 3 DAYS THEN 2 TABS DAILY THEREAFTER (Patient taking differently: Take 40 mg by mouth once daily. TAKE 2 TABS BY MOUTH EVERY MORNING AND 1 TAB EVERY EVENING FOR 3 DAYS THEN 2 TABS DAILY THEREAFTER) 180 tablet 1    vitamin D (VITAMIN D3) 1000 units Tab Take 1,000 Units by mouth once daily.      zolpidem (AMBIEN) 10 mg Tab Take 1 tablet (10 mg total) by mouth nightly as needed. 30 tablet 5    aspirin (ECOTRIN) 81 MG EC tablet Take 1 tablet (81 mg total) by mouth once daily. 100 tablet 3    fluticasone propionate (FLONASE) 50 mcg/actuation nasal spray 2 sprays (100 mcg total) by Each Nostril route once daily. 16 g 3    sildenafiL (VIAGRA) 100 MG tablet Take 1 tablet (100 mg total) by mouth daily as needed for Erectile Dysfunction. 10 tablet 1    warfarin (COUMADIN) 5 MG tablet Take 5mg orally daily except 2.5mg orally on Monday / Fridays (Patient taking differently: Take 2.5 mg by mouth. Take 5mg orally daily except 2.5mg orally on Wednesdays and Saturdays) 30 tablet 11       Past  Surgical History:   Procedure Laterality Date    CARDIAC CATHETERIZATION  Dec. 2012    CARDIAC DEFIBRILLATOR PLACEMENT Left     CRRT-D    COLONOSCOPY N/A 3/6/2018    Procedure: COLONOSCOPY;  Surgeon: Alonso Bone MD;  Location: [facility] ENDO (2ND FLR);  Service: Endoscopy;  Laterality: N/A;    COLONOSCOPY N/A 7/17/2019    Procedure: COLONOSCOPY;  Surgeon: Blane Valdez MD;  Location: [facility] ENDO (2ND FLR);  Service: Endoscopy;  Laterality: N/A;    COLONOSCOPY N/A 7/18/2019    Procedure: COLONOSCOPY;  Surgeon: Blane Valdez MD;  Location: [facility] ENDO (2ND FLR);  Service: Endoscopy;  Laterality: N/A;    ESOPHAGOGASTRODUODENOSCOPY N/A 7/17/2019    Procedure: EGD (ESOPHAGOGASTRODUODENOSCOPY);  Surgeon: Blane Valdez MD;  Location: [facility] ENDO (2ND FLR);  Service: Endoscopy;  Laterality: N/A;    ESOPHAGOGASTRODUODENOSCOPY N/A 7/18/2019    Procedure: EGD (ESOPHAGOGASTRODUODENOSCOPY);  Surgeon: Blane Valdez MD;  Location: [facility] ENDO (2ND FLR);  Service: Endoscopy;  Laterality: N/A;    NONINVASIVE CARDIAC ELECTROPHYSIOLOGY STUDY N/A 10/18/2019    Procedure: CARDIAC ELECTROPHYSIOLOGY STUDY, NONINVASIVE;  Surgeon: Raz Wagner MD;  Location: [facility] EP LAB;  Service: Cardiology;  Laterality: N/A;  VT, DFTs, MDT CRTD in situ, LVAD, anes, MB, 3098    REPLACEMENT OF IMPLANTABLE CARDIOVERTER-DEFIBRILLATOR (ICD) GENERATOR N/A 3/9/2020    Procedure: REPLACEMENT, ICD GENERATOR;  Surgeon: Harry Yun MD;  Location: [facility] EP LAB;  Service: Cardiology;  Laterality: N/A;  VT, ICD Gen Change and Lead Revision, MDT, MAC, DM,3 Prep    REVISION OF IMPLANTABLE CARDIOVERTER-DEFIBRILLATOR (ICD) ELECTRODE LEAD PLACEMENT N/A 3/9/2020    Procedure: REVISION, INSERTION, ELECTRODE LEAD, ICD;  Surgeon: Harry Yun MD;  Location: [facility] EP LAB;  Service: Cardiology;  Laterality: N/A;  VT, ICD Gen Change and Lead Revision, MDT, MAC, DM,3 Prep    TREATMENT OF CARDIAC ARRHYTHMIA  10/18/2019    Procedure: Cardioversion or Defibrillation;  Surgeon:  Raz Wagner MD;  Location: [facility] LAB;  Service: Cardiology;;       Social History     Socioeconomic History    Marital status:      Spouse name: Not on file    Number of children: Not on file    Years of education: Not on file    Highest education level: Not on file   Occupational History     Employer: [employer]    Social Needs    Financial resource strain: Not hard at all    Food insecurity     Worry: Never true     Inability: Never true    Transportation needs     Medical: No     Non-medical: No   Tobacco Use    Smoking status: Former Smoker     Packs/day: 1.00     Years: 31.00     Pack years: 31.00     Types: Cigarettes     Quit date: 2018     Years since quittin.4    Smokeless tobacco: Never Used    Tobacco comment: pt is quiting on his own - pt stated not qualified for program;  pt  quit on his own   Substance and Sexual Activity    Alcohol use: No     Alcohol/week: 0.0 standard drinks     Frequency: Never     Drinks per session: Patient refused     Binge frequency: Never     Comment: one fifth of gin or rum/week    Drug use: No    Sexual activity: Yes     Partners: Female     Birth control/protection: None     Comment: 10/5/17  with same partner 7 years    Lifestyle    Physical activity     Days per week: Not on file     Minutes per session: 60 min    Stress: Very much   Relationships    Social connections     Talks on phone: More than three times a week     Gets together: More than three times a week     Attends [employer] service: Not on file     Active member of club or organization: Yes     Attends meetings of clubs or organizations: More than 4 times per year     Relationship status:    Other Topics Concern    Not on file   Social History Narrative    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       OBJECTIVE:     Vital Signs Range (Last 24H):  Temp:  [35.6 °C (96.1 °F)-37.2 °C (99 °F)]   Pulse:  [61-82]   Resp:  [14-18]   BP: ()/(0-67)    SpO2:  [94 %-99 %]       CBC:   Recent Labs     07/08/20  0511 07/09/20  0605   WBC 5.89 5.73   RBC 4.48* 4.21*   HGB 12.2* 11.4*   HCT 39.6* 36.8*    214   MCV 88 87   MCH 27.2 27.1   MCHC 30.8* 31.0*       CMP:   Recent Labs     07/08/20  0511 07/09/20  0605    135*   K 3.7 4.3    105   CO2 24 24   BUN 14 12   CREATININE 1.7* 1.5*   GLU 84 85   MG 2.2 2.1   PHOS 3.4 2.9   CALCIUM 9.3 9.0   ALBUMIN 3.5  --    PROT 7.4  --    ALKPHOS 94  --    ALT 23  --    AST 30  --    BILITOT 0.5  --        INR:  Recent Labs     07/07/20  0545 07/08/20  0511 07/09/20  0605   INR 2.2* 2.1* 1.9*   APTT 34.9* 34.8* 34.9*       Diagnostic Studies: No relevant studies.    EKG: No recent studies available.    2D ECHO:  7/5/2020  · Eccentric left ventricular hypertrophy. Severely decreased left ventricular systolic function. The estimated ejection fraction is 10%.  · Grade I (mild) left ventricular diastolic dysfunction consistent with impaired relaxation.  · Moderate left atrial enlargement.  · Small posterior pericardial effusion.  · Mild mitral regurgitation.  · Mild tricuspid regurgitation.  · LVAD present. Base speed is 9800 RPMs. The pump type is a Heartmate II. The interventricular septum appears midline. LVEDD 9.5cm. Unable to comment on AV  · RV not well seen. Appears normal in size on limited images.     There is a suggestion of a mobile mass on a lead in the right atrium (image 47)      ASSESSMENT/PLAN:       Pre-op Assessment    I have reviewed the Patient Summary Reports.     I have reviewed the Nursing Notes.    I have reviewed the Medications.     Review of Systems  Anesthesia Hx:  No problems with previous Anesthesia  History of prior surgery of interest to airway management or planning: Denies Family Hx of Anesthesia complications.   Denies Personal Hx of Anesthesia complications.   Cardiovascular:   Exercise tolerance: poor Hypertension CHF ECG has been reviewed.    Pulmonary:   Denies COPD.   Denies Asthma. Sleep Apnea    Renal/:   Chronic Renal Disease    Hepatic/GI:   Denies GERD. Denies Liver Disease.    Neurological:   Denies CVA. Denies Seizures.    Endocrine:   Denies Diabetes. Hyperthyroidism        Physical Exam  General:  Well nourished, Obesity    Airway/Jaw/Neck:  Airway Findings: Mouth Opening: Normal Tongue: Normal  General Airway Assessment: Adult  Mallampati: III  Improves to II with phonation.  TM Distance: Normal, at least 6 cm  Jaw/Neck Findings:  Neck ROM: Normal ROM      Dental:  Dental Findings: In tact   Chest/Lungs:  Chest/Lungs Findings: Clear to auscultation, Normal Respiratory Rate     Heart/Vascular:  Heart Findings: Rate: Normal        Mental Status:  Mental Status Findings:  Cooperative, Alert and Oriented         Anesthesia Plan  Type of Anesthesia, risks & benefits discussed:  Anesthesia Type:  general  Patient's Preference:   Intra-op Monitoring Plan: arterial line and standard ASA monitors  Intra-op Monitoring Plan Comments:   Post Op Pain Control Plan: per primary service following discharge from PACU  Post Op Pain Control Plan Comments:   Induction:   IV  Beta Blocker:  Patient is not currently on a Beta-Blocker (No further documentation required).       Informed Consent: Patient understands risks and agrees with Anesthesia plan.  Questions answered. Anesthesia consent signed with patient.  ASA Score: 4     Day of Surgery Review of History & Physical:    H&P update referred to the provider.         Ready For Surgery From Anesthesia Perspective.

## 2020-07-09 NOTE — ASSESSMENT & PLAN NOTE
LDH elevated likely from dehydration, improved. Trend 640 -> 585 -> 474 -> 445 -> 416. Received 250 ml fluids on 7/4. . LFT normal.  Will c/w coumadin. INR 1.9. Coumadin 5 mg. Given risk of GI bleed, patient not a candidate for heparin bridge. Also given concern risk of bleeding, hold off on starting persantine. C/W ASA 81 mg daily.  -Pt reluctant to have speed increase as he had symptoms in the past with speed change, no active symptoms. Will hold speed change for now.  Also he prefers not to uptitrate his BP medications as he is anxious and would like not to increase medications at this time. Informed risks of uncontrolled BP, pt aware of risks.   - INR 1.9 Goal of 2-3   - c/w coumadin  - patient denies any dark urine and no obvious scleral icterus on exam  - drive site clean and dry but will send site cultures.    Procedure: Device Interrogation Including analysis of device parameters  Current Settings: Ventricular Assist Device  Review of device function is stable/unstable stable    TXP LVAD INTERROGATIONS 7/9/2020 7/9/2020 7/8/2020 7/8/2020 7/8/2020 7/8/2020 7/8/2020   Type HeartMate II HeartMate II HeartMate II HeartMate II HeartMate II HeartMate II HeartMate II   Flow 5.7 5.4 5.0 4.9 4.9 5.6 5.3   Speed 9800 9800 9800 9800 9800 9800 9800   PI 3.7 4.0 4.0 3.7 3.4 3.7 3.1   Power (Jackson) 6.6 6.2 6.1 6.1 6.1 6.4 6.4   LSL 9400 - - - 9400 9400 9400   Pulsatility Intermittent pulse - - - Intermittent pulse Intermittent pulse Intermittent pulse

## 2020-07-09 NOTE — PROGRESS NOTES
[facility]  Cardiac Electrophysiology  Progress Note    Admission Date: 7/1/2020  Code Status: Full Code   Attending Physician: Amparo Lopez MD   Expected Discharge Date: 7/10/2020  Principal Problem:ICD (implantable cardioverter-defibrillator) infection    Subjective:     Interval History:   Afebrile, no leukocytosis, Hb stable.     HTS team following ? PT did not want to increase LVAD speed as recommend by HTS. Continues on 9800 Rpm. Bp was elevated however patient does not want additional medication. FeelS it is attributed to high stess environement    INR 1.9 on warfarin 5mg    Cr 1.5 - Stable    Telemetry showing occasional PVCs    ROS  Objective:     Vital Signs (Most Recent):  Temp: 99 °F (37.2 °C) (07/09/20 0800)  Pulse: 75 (07/09/20 0800)  Resp: 14 (07/09/20 0800)  BP: (!) 90/0 (07/09/20 0800)  SpO2: 98 % (07/09/20 0800) Vital Signs (24h Range):  Temp:  [96.1 °F (35.6 °C)-99 °F (37.2 °C)] 99 °F (37.2 °C)  Pulse:  [61-82] 75  Resp:  [14-18] 14  SpO2:  [94 %-99 %] 98 %  BP: ()/(0-67) 90/0     Weight: 121.5 kg (267 lb 13.7 oz)  Body mass index is 35.34 kg/m².     SpO2: 98 %  O2 Device (Oxygen Therapy): room air    Physical Exam   Constitutional: He is oriented to person, place, and time. He appears well-developed and well-nourished. No distress.   HENT:   Head: Normocephalic.   Left Ear: External ear normal.   Eyes: Pupils are equal, round, and reactive to light. Right eye exhibits no discharge. Left eye exhibits no discharge.   Neck: Normal range of motion. No thyromegaly present.   Cardiovascular: Normal rate and regular rhythm. Exam reveals no friction rub.   Murmur heard.  Smooth VAD hum   Pulmonary/Chest: Effort normal and breath sounds normal. No respiratory distress.   Abdominal: Soft. Bowel sounds are normal. He exhibits no distension. There is no abdominal tenderness.   Musculoskeletal: Normal range of motion.         General: No edema.   Neurological: He is alert and  oriented to person, place, and time.   Skin: Skin is warm and dry. No rash noted. No erythema.            Significant Labs:   Blood Culture: No results for input(s): LABBLOO in the last 48 hours., CMP:   Recent Labs   Lab 07/08/20  0511 07/09/20  0605    135*   K 3.7 4.3    105   CO2 24 24   GLU 84 85   BUN 14 12   CREATININE 1.7* 1.5*   CALCIUM 9.3 9.0   PROT 7.4  --    ALBUMIN 3.5  --    BILITOT 0.5  --    ALKPHOS 94  --    AST 30  --    ALT 23  --    ANIONGAP 10 6*   ESTGFRAFRICA 52.1* >60.0   EGFRNONAA 45.0* 52.4*   , CBC:   Recent Labs   Lab 07/08/20  0511 07/09/20  0605   WBC 5.89 5.73   HGB 12.2* 11.4*   HCT 39.6* 36.8*    214   , INR:   Recent Labs   Lab 07/08/20  0511 07/09/20  0605   INR 2.1* 1.9*    and All pertinent lab results from the last 24 hours have been reviewed.    Significant Imaging: Echocardiogram:   Transthoracic echo (TTE) complete (Cupid Only):   Results for orders placed or performed during the hospital encounter of 07/01/20   Echo Color Flow Doppler? Yes   Result Value Ref Range    Ascending aorta 3.34 cm    STJ 2.96 cm    IVS 1.04 0.6 - 1.1 cm    LA size 3.90 cm    Left Atrium Major Axis 6.81 cm    Left Atrium Minor Axis 6.81 cm    LVIDD 9.50 (A) 3.5 - 6.0 cm    LVIDS 9.37 (A) 2.1 - 4.0 cm    LVOT diameter 2.49 cm    PW 1.34 (A) 0.6 - 1.1 cm    MV Peak A Jorge 0.49 m/s    E wave decelartion time 112.55 msec    MV Peak E Jorge 0.78 m/s    RA Major Axis 5.76 cm    RA Width 4.15 cm    Sinus 3.16 cm    TR Max Jorge 2.09 m/s    TDI LATERAL 0.10 m/s    TDI SEPTAL 0.08 m/s    LA WIDTH 4.84 cm    MV stenosis pressure 1/2 time 32.64 ms    LV Diastolic Volume 547.08 mL    LV Systolic Volume 489.59 mL    RV S' 10.73 cm/s    LV LATERAL E/E' RATIO 7.80 m/s    LV SEPTAL E/E' RATIO 9.75 m/s    FS 1 %    LA volume 109.26 cm3    LV mass 682.26 g    Left Ventricle Relative Wall Thickness 0.28 cm    MV valve area p 1/2 method 6.74 cm2    E/A ratio 1.59     Mean e' 0.09 m/s    LVOT area 4.9 cm2     E/E' ratio 8.67 m/s    LV Systolic Volume Index 201.6 mL/m2    LV Diastolic Volume Index 225.22 mL/m2    LA Volume Index 45.0 mL/m2    LV Mass Index 281 g/m2    Triscuspid Valve Regurgitation Peak Gradient 17 mmHg    BSA 2.49 m2    Narrative    · Eccentric left ventricular hypertrophy. Severely decreased left   ventricular systolic function. The estimated ejection fraction is 10%.  · Grade I (mild) left ventricular diastolic dysfunction consistent with   impaired relaxation.  · Moderate left atrial enlargement.  · Small posterior pericardial effusion.  · Mild mitral regurgitation.  · Mild tricuspid regurgitation.  · LVAD present. Base speed is 9800 RPMs. The pump type is a Heartmate II.   The interventricular septum appears midline. LVEDD 9.5cm. Unable to   comment on AV  · RV not well seen. Appears normal in size on limited images.     There is a suggestion of a mobile mass on a lead in the right atrium   (image 47)       Assessment and Plan:     * ICD (implantable cardioverter-defibrillator) infection  BiV-Icd Med[employer]onic (2014 Dr. Chiu), successful DFT at 35J on 10/18/2019. Multiple VT episodes,  generator change (BiV ICD -> dual ICD),  device revision (addition of a new RV/ICD lead) and another VF induction via ICD on 3/2020. Presented from device clinic with device erosion.     - Echo done 7/5 Eccentric left ventricular hypertrophy. Severely decreased left ventricular systolic function. EF 10%. LVAD present. Base speed is 9800 RPMs. Evidence suggesting mobile mass on RA lead  - Blood cultured negative to date  - Wound cultures growing Enterobacter.  - Antibiotic regimen per ID recommendations. Switched to Cefepime q8h.  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   - CT surgery consulted for evaluation and back up plan recommendations regarding device extraction. Given prior surgical history no surgical back up will be present at lead removal.  - Device extraction scheduled for Friday 7/10/20 with Dr. Yun   - ordered COVID testing   - will need left peripheral access   - INR 1.9 today. On coumadin 5 mg. Given risk of GI bleed, patient not a candidate for heparin bridge. Recommend keep INR as low as is safe per CT sx/HTS.    NPO after midnight for device extraction tomorrow.     Lana Garza MD  Cardiac Electrophysiology  [facility]

## 2020-07-09 NOTE — ASSESSMENT & PLAN NOTE
LDH in 640 -> 585 -> 492 -> 474 -> 418 after  250 ml fluids, reduced torsemide. BNP 97. Repeat TTE. LFT normal.  Will c/w coumadin. INR 2.2 today. Coumadin 7.5 mg. Given risk of GI bleed, patient not a candidate for heparin bridge. Also given concern risk of bleeding, hold off on starting persantine. C/W ASA 81 mg daily. Likely LDH elevation as an acute phase reactant from infection.

## 2020-07-10 ENCOUNTER — ANESTHESIA (OUTPATIENT)
Dept: MEDSURG UNIT | Facility: HOSPITAL | Age: 55
DRG: 261 | End: 2020-07-10
Payer: COMMERCIAL

## 2020-07-10 LAB
ALBUMIN SERPL BCP-MCNC: 3.5 G/DL (ref 3.5–5.2)
ALLENS TEST: ABNORMAL
ALP SERPL-CCNC: 93 U/L (ref 55–135)
ALT SERPL W/O P-5'-P-CCNC: 24 U/L (ref 10–44)
ANION GAP SERPL CALC-SCNC: 7 MMOL/L (ref 8–16)
APTT BLDCRRT: 34.7 SEC (ref 21–32)
AST SERPL-CCNC: 30 U/L (ref 10–40)
BASOPHILS # BLD AUTO: 0.02 K/UL (ref 0–0.2)
BASOPHILS # BLD AUTO: 0.02 K/UL (ref 0–0.2)
BASOPHILS NFR BLD: 0.1 % (ref 0–1.9)
BASOPHILS NFR BLD: 0.4 % (ref 0–1.9)
BILIRUB DIRECT SERPL-MCNC: 0.2 MG/DL (ref 0.1–0.3)
BILIRUB SERPL-MCNC: 0.4 MG/DL (ref 0.1–1)
BNP SERPL-MCNC: 141 PG/ML (ref 0–99)
BUN SERPL-MCNC: 12 MG/DL (ref 6–20)
CALCIUM SERPL-MCNC: 9.5 MG/DL (ref 8.7–10.5)
CHLORIDE SERPL-SCNC: 105 MMOL/L (ref 95–110)
CO2 SERPL-SCNC: 27 MMOL/L (ref 23–29)
CREAT SERPL-MCNC: 1.8 MG/DL (ref 0.5–1.4)
CRP SERPL-MCNC: 14.4 MG/L (ref 0–8.2)
DIFFERENTIAL METHOD: ABNORMAL
DIFFERENTIAL METHOD: ABNORMAL
EOSINOPHIL # BLD AUTO: 0 K/UL (ref 0–0.5)
EOSINOPHIL # BLD AUTO: 0.2 K/UL (ref 0–0.5)
EOSINOPHIL NFR BLD: 0 % (ref 0–8)
EOSINOPHIL NFR BLD: 3.4 % (ref 0–8)
ERYTHROCYTE [DISTWIDTH] IN BLOOD BY AUTOMATED COUNT: 15.3 % (ref 11.5–14.5)
ERYTHROCYTE [DISTWIDTH] IN BLOOD BY AUTOMATED COUNT: 15.5 % (ref 11.5–14.5)
EST. GFR  (AFRICAN AMERICAN): 48.6 ML/MIN/1.73 M^2
EST. GFR  (NON AFRICAN AMERICAN): 42 ML/MIN/1.73 M^2
GLUCOSE SERPL-MCNC: 218 MG/DL (ref 70–110)
GLUCOSE SERPL-MCNC: 86 MG/DL (ref 70–110)
HCO3 UR-SCNC: 21.5 MMOL/L (ref 24–28)
HCT VFR BLD AUTO: 36.4 % (ref 40–54)
HCT VFR BLD AUTO: 39.1 % (ref 40–54)
HCT VFR BLD CALC: 38 %PCV (ref 36–54)
HGB BLD-MCNC: 11.6 G/DL (ref 14–18)
HGB BLD-MCNC: 11.9 G/DL (ref 14–18)
IMM GRANULOCYTES # BLD AUTO: 0.03 K/UL (ref 0–0.04)
IMM GRANULOCYTES # BLD AUTO: 0.1 K/UL (ref 0–0.04)
IMM GRANULOCYTES NFR BLD AUTO: 0.6 % (ref 0–0.5)
IMM GRANULOCYTES NFR BLD AUTO: 0.7 % (ref 0–0.5)
INR PPP: 2.1 (ref 0.8–1.2)
LDH SERPL L TO P-CCNC: 421 U/L (ref 110–260)
LYMPHOCYTES # BLD AUTO: 0.5 K/UL (ref 1–4.8)
LYMPHOCYTES # BLD AUTO: 0.9 K/UL (ref 1–4.8)
LYMPHOCYTES NFR BLD: 17.2 % (ref 18–48)
LYMPHOCYTES NFR BLD: 3.6 % (ref 18–48)
MAGNESIUM SERPL-MCNC: 2 MG/DL (ref 1.6–2.6)
MCH RBC QN AUTO: 27 PG (ref 27–31)
MCH RBC QN AUTO: 27.6 PG (ref 27–31)
MCHC RBC AUTO-ENTMCNC: 30.4 G/DL (ref 32–36)
MCHC RBC AUTO-ENTMCNC: 31.9 G/DL (ref 32–36)
MCV RBC AUTO: 87 FL (ref 82–98)
MCV RBC AUTO: 89 FL (ref 82–98)
MONOCYTES # BLD AUTO: 0.3 K/UL (ref 0.3–1)
MONOCYTES # BLD AUTO: 0.6 K/UL (ref 0.3–1)
MONOCYTES NFR BLD: 1.8 % (ref 4–15)
MONOCYTES NFR BLD: 11.2 % (ref 4–15)
NEUTROPHILS # BLD AUTO: 12.9 K/UL (ref 1.8–7.7)
NEUTROPHILS # BLD AUTO: 3.6 K/UL (ref 1.8–7.7)
NEUTROPHILS NFR BLD: 67.2 % (ref 38–73)
NEUTROPHILS NFR BLD: 93.8 % (ref 38–73)
NRBC BLD-RTO: 0 /100 WBC
NRBC BLD-RTO: 0 /100 WBC
PCO2 BLDA: 46.6 MMHG (ref 35–45)
PH SMN: 7.27 [PH] (ref 7.35–7.45)
PHOSPHATE SERPL-MCNC: 3.2 MG/DL (ref 2.7–4.5)
PLATELET # BLD AUTO: 225 K/UL (ref 150–350)
PLATELET # BLD AUTO: 233 K/UL (ref 150–350)
PMV BLD AUTO: 10.3 FL (ref 9.2–12.9)
PMV BLD AUTO: 10.7 FL (ref 9.2–12.9)
PO2 BLDA: 37 MMHG (ref 40–60)
POC BE: -5 MMOL/L
POC IONIZED CALCIUM: 1.17 MMOL/L (ref 1.06–1.42)
POC SATURATED O2: 62 % (ref 95–100)
POC TCO2: 23 MMOL/L (ref 24–29)
POTASSIUM BLD-SCNC: 4.3 MMOL/L (ref 3.5–5.1)
POTASSIUM SERPL-SCNC: 4.1 MMOL/L (ref 3.5–5.1)
PREALB SERPL-MCNC: 22 MG/DL (ref 20–43)
PROT SERPL-MCNC: 7.3 G/DL (ref 6–8.4)
PROTHROMBIN TIME: 20.4 SEC (ref 9–12.5)
RBC # BLD AUTO: 4.2 M/UL (ref 4.6–6.2)
RBC # BLD AUTO: 4.41 M/UL (ref 4.6–6.2)
SAMPLE: ABNORMAL
SITE: ABNORMAL
SODIUM BLD-SCNC: 140 MMOL/L (ref 136–145)
SODIUM SERPL-SCNC: 139 MMOL/L (ref 136–145)
WBC # BLD AUTO: 13.72 K/UL (ref 3.9–12.7)
WBC # BLD AUTO: 5.35 K/UL (ref 3.9–12.7)

## 2020-07-10 PROCEDURE — 99233 PR SUBSEQUENT HOSPITAL CARE,LEVL III: ICD-10-PCS | Mod: ,,, | Performed by: INTERNAL MEDICINE

## 2020-07-10 PROCEDURE — 33244 REMOVE ELCTRD TRANSVENOUSLY: CPT | Mod: 22,,, | Performed by: INTERNAL MEDICINE

## 2020-07-10 PROCEDURE — 33244 PR RMV PACER/ELECTRD,TRANSVEN EXTRCT: ICD-10-PCS | Mod: 22,,, | Performed by: INTERNAL MEDICINE

## 2020-07-10 PROCEDURE — 63600175 PHARM REV CODE 636 W HCPCS

## 2020-07-10 PROCEDURE — D9220A PRA ANESTHESIA: Mod: ANES,,, | Performed by: ANESTHESIOLOGY

## 2020-07-10 PROCEDURE — 20000000 HC ICU ROOM

## 2020-07-10 PROCEDURE — 63600175 PHARM REV CODE 636 W HCPCS: Performed by: STUDENT IN AN ORGANIZED HEALTH CARE EDUCATION/TRAINING PROGRAM

## 2020-07-10 PROCEDURE — 25000003 PHARM REV CODE 250: Performed by: STUDENT IN AN ORGANIZED HEALTH CARE EDUCATION/TRAINING PROGRAM

## 2020-07-10 PROCEDURE — 63600175 PHARM REV CODE 636 W HCPCS: Performed by: NURSE ANESTHETIST, CERTIFIED REGISTERED

## 2020-07-10 PROCEDURE — 33241 REMOVE PULSE GENERATOR: CPT | Mod: 22,51,, | Performed by: INTERNAL MEDICINE

## 2020-07-10 PROCEDURE — 86140 C-REACTIVE PROTEIN: CPT

## 2020-07-10 PROCEDURE — D9220A PRA ANESTHESIA: ICD-10-PCS | Mod: CRNA,,, | Performed by: NURSE ANESTHETIST, CERTIFIED REGISTERED

## 2020-07-10 PROCEDURE — 36620 INSERTION CATHETER ARTERY: CPT | Mod: 59,,, | Performed by: ANESTHESIOLOGY

## 2020-07-10 PROCEDURE — 63600175 PHARM REV CODE 636 W HCPCS: Performed by: ANESTHESIOLOGY

## 2020-07-10 PROCEDURE — 99233 SBSQ HOSP IP/OBS HIGH 50: CPT | Mod: ,,, | Performed by: INTERNAL MEDICINE

## 2020-07-10 PROCEDURE — 63600175 PHARM REV CODE 636 W HCPCS: Performed by: INTERNAL MEDICINE

## 2020-07-10 PROCEDURE — 84134 ASSAY OF PREALBUMIN: CPT

## 2020-07-10 PROCEDURE — D9220A PRA ANESTHESIA: Mod: CRNA,,, | Performed by: NURSE ANESTHETIST, CERTIFIED REGISTERED

## 2020-07-10 PROCEDURE — 33241 PR RMV PULSE GENERATOR,SNGL/DUAL: ICD-10-PCS | Mod: 22,51,, | Performed by: INTERNAL MEDICINE

## 2020-07-10 PROCEDURE — 83735 ASSAY OF MAGNESIUM: CPT

## 2020-07-10 PROCEDURE — 93750 PR INTERROGATE VENT ASSIST DEV, IN PERSON, W PHYSICIAN ANALYSIS: ICD-10-PCS | Mod: ,,, | Performed by: INTERNAL MEDICINE

## 2020-07-10 PROCEDURE — 25000003 PHARM REV CODE 250: Performed by: INTERNAL MEDICINE

## 2020-07-10 PROCEDURE — 85025 COMPLETE CBC W/AUTO DIFF WBC: CPT

## 2020-07-10 PROCEDURE — 37000008 HC ANESTHESIA 1ST 15 MINUTES: Performed by: INTERNAL MEDICINE

## 2020-07-10 PROCEDURE — C2628 CATHETER, OCCLUSION: HCPCS | Performed by: INTERNAL MEDICINE

## 2020-07-10 PROCEDURE — 93750 INTERROGATION VAD IN PERSON: CPT | Mod: ,,, | Performed by: INTERNAL MEDICINE

## 2020-07-10 PROCEDURE — 25500020 PHARM REV CODE 255: Performed by: INTERNAL MEDICINE

## 2020-07-10 PROCEDURE — 85610 PROTHROMBIN TIME: CPT

## 2020-07-10 PROCEDURE — 25000003 PHARM REV CODE 250: Performed by: NURSE ANESTHETIST, CERTIFIED REGISTERED

## 2020-07-10 PROCEDURE — A4216 STERILE WATER/SALINE, 10 ML: HCPCS | Performed by: INTERNAL MEDICINE

## 2020-07-10 PROCEDURE — 87070 CULTURE OTHR SPECIMN AEROBIC: CPT

## 2020-07-10 PROCEDURE — 80076 HEPATIC FUNCTION PANEL: CPT

## 2020-07-10 PROCEDURE — 85730 THROMBOPLASTIN TIME PARTIAL: CPT

## 2020-07-10 PROCEDURE — P9016 RBC LEUKOCYTES REDUCED: HCPCS

## 2020-07-10 PROCEDURE — D9220A PRA ANESTHESIA: ICD-10-PCS | Mod: ANES,,, | Performed by: ANESTHESIOLOGY

## 2020-07-10 PROCEDURE — 83880 ASSAY OF NATRIURETIC PEPTIDE: CPT

## 2020-07-10 PROCEDURE — 25000003 PHARM REV CODE 250

## 2020-07-10 PROCEDURE — C2629 INTRO/SHEATH, LASER: HCPCS | Performed by: INTERNAL MEDICINE

## 2020-07-10 PROCEDURE — 27201423 OPTIME MED/SURG SUP & DEVICES STERILE SUPPLY: Performed by: INTERNAL MEDICINE

## 2020-07-10 PROCEDURE — 93005 ELECTROCARDIOGRAM TRACING: CPT

## 2020-07-10 PROCEDURE — 27201037 HC PRESSURE MONITORING SET UP

## 2020-07-10 PROCEDURE — 93010 EKG 12-LEAD: ICD-10-PCS | Mod: ,,, | Performed by: INTERNAL MEDICINE

## 2020-07-10 PROCEDURE — 83615 LACTATE (LD) (LDH) ENZYME: CPT

## 2020-07-10 PROCEDURE — C1894 INTRO/SHEATH, NON-LASER: HCPCS | Performed by: INTERNAL MEDICINE

## 2020-07-10 PROCEDURE — 80048 BASIC METABOLIC PNL TOTAL CA: CPT

## 2020-07-10 PROCEDURE — 37000009 HC ANESTHESIA EA ADD 15 MINS: Performed by: INTERNAL MEDICINE

## 2020-07-10 PROCEDURE — 93010 ELECTROCARDIOGRAM REPORT: CPT | Mod: ,,, | Performed by: INTERNAL MEDICINE

## 2020-07-10 PROCEDURE — 33244 REMOVE ELCTRD TRANSVENOUSLY: CPT | Performed by: INTERNAL MEDICINE

## 2020-07-10 PROCEDURE — 36620 PR INSERT CATH,ART,PERCUT,SHORTTERM: ICD-10-PCS | Mod: 59,,, | Performed by: ANESTHESIOLOGY

## 2020-07-10 PROCEDURE — 84100 ASSAY OF PHOSPHORUS: CPT

## 2020-07-10 PROCEDURE — 94761 N-INVAS EAR/PLS OXIMETRY MLT: CPT

## 2020-07-10 PROCEDURE — 33241 REMOVE PULSE GENERATOR: CPT | Performed by: INTERNAL MEDICINE

## 2020-07-10 PROCEDURE — 27000248 HC VAD-ADDITIONAL DAY

## 2020-07-10 PROCEDURE — 87075 CULTR BACTERIA EXCEPT BLOOD: CPT | Mod: 59

## 2020-07-10 RX ORDER — VANCOMYCIN HCL IN 5 % DEXTROSE 1G/250ML
1000 PLASTIC BAG, INJECTION (ML) INTRAVENOUS ONCE
Status: DISCONTINUED | OUTPATIENT
Start: 2020-07-10 | End: 2020-07-10

## 2020-07-10 RX ORDER — KETAMINE HCL IN 0.9 % NACL 50 MG/5 ML
SYRINGE (ML) INTRAVENOUS
Status: DISCONTINUED | OUTPATIENT
Start: 2020-07-10 | End: 2020-07-10

## 2020-07-10 RX ORDER — HYDROMORPHONE HYDROCHLORIDE 1 MG/ML
0.2 INJECTION, SOLUTION INTRAMUSCULAR; INTRAVENOUS; SUBCUTANEOUS EVERY 5 MIN PRN
Status: DISCONTINUED | OUTPATIENT
Start: 2020-07-10 | End: 2020-07-10 | Stop reason: HOSPADM

## 2020-07-10 RX ORDER — GLYCOPYRROLATE 0.2 MG/ML
INJECTION INTRAMUSCULAR; INTRAVENOUS
Status: DISCONTINUED | OUTPATIENT
Start: 2020-07-10 | End: 2020-07-10

## 2020-07-10 RX ORDER — HYDROCODONE BITARTRATE AND ACETAMINOPHEN 500; 5 MG/1; MG/1
TABLET ORAL
Status: DISCONTINUED | OUTPATIENT
Start: 2020-07-10 | End: 2020-07-10

## 2020-07-10 RX ORDER — VANCOMYCIN HYDROCHLORIDE 1 G/20ML
INJECTION, POWDER, LYOPHILIZED, FOR SOLUTION INTRAVENOUS
Status: DISCONTINUED | OUTPATIENT
Start: 2020-07-10 | End: 2020-07-10

## 2020-07-10 RX ORDER — CEFAZOLIN SODIUM 1 G/3ML
1 INJECTION, POWDER, FOR SOLUTION INTRAMUSCULAR; INTRAVENOUS ONCE
Status: DISCONTINUED | OUTPATIENT
Start: 2020-07-10 | End: 2020-07-10

## 2020-07-10 RX ORDER — LIDOCAINE HYDROCHLORIDE 20 MG/ML
INJECTION, SOLUTION INFILTRATION; PERINEURAL
Status: DISCONTINUED | OUTPATIENT
Start: 2020-07-10 | End: 2020-07-10

## 2020-07-10 RX ORDER — DOBUTAMINE HYDROCHLORIDE 400 MG/100ML
2.5 INJECTION, SOLUTION INTRAVENOUS CONTINUOUS
Status: DISCONTINUED | OUTPATIENT
Start: 2020-07-10 | End: 2020-07-11

## 2020-07-10 RX ORDER — BUPIVACAINE HYDROCHLORIDE 2.5 MG/ML
INJECTION, SOLUTION EPIDURAL; INFILTRATION; INTRACAUDAL
Status: DISCONTINUED | OUTPATIENT
Start: 2020-07-10 | End: 2020-07-10

## 2020-07-10 RX ORDER — ONDANSETRON 2 MG/ML
INJECTION INTRAMUSCULAR; INTRAVENOUS
Status: DISCONTINUED | OUTPATIENT
Start: 2020-07-10 | End: 2020-07-10

## 2020-07-10 RX ORDER — PHENYLEPHRINE HYDROCHLORIDE 10 MG/ML
INJECTION INTRAVENOUS
Status: DISCONTINUED | OUTPATIENT
Start: 2020-07-10 | End: 2020-07-10

## 2020-07-10 RX ORDER — SODIUM CHLORIDE 9 MG/ML
INJECTION, SOLUTION INTRAMUSCULAR; INTRAVENOUS; SUBCUTANEOUS
Status: DISCONTINUED | OUTPATIENT
Start: 2020-07-10 | End: 2020-07-10

## 2020-07-10 RX ORDER — DOXAZOSIN 8 MG/1
8 TABLET ORAL EVERY 12 HOURS
Status: DISCONTINUED | OUTPATIENT
Start: 2020-07-11 | End: 2020-07-14 | Stop reason: HOSPADM

## 2020-07-10 RX ORDER — WARFARIN SODIUM 5 MG/1
5 TABLET ORAL ONCE
Status: COMPLETED | OUTPATIENT
Start: 2020-07-10 | End: 2020-07-10

## 2020-07-10 RX ORDER — EPHEDRINE SULFATE 50 MG/ML
INJECTION, SOLUTION INTRAVENOUS
Status: DISCONTINUED | OUTPATIENT
Start: 2020-07-10 | End: 2020-07-10

## 2020-07-10 RX ORDER — MIDAZOLAM HYDROCHLORIDE 1 MG/ML
INJECTION, SOLUTION INTRAMUSCULAR; INTRAVENOUS
Status: DISCONTINUED | OUTPATIENT
Start: 2020-07-10 | End: 2020-07-10

## 2020-07-10 RX ORDER — GUANFACINE 1 MG/1
2 TABLET ORAL NIGHTLY
Status: DISCONTINUED | OUTPATIENT
Start: 2020-07-11 | End: 2020-07-14 | Stop reason: HOSPADM

## 2020-07-10 RX ORDER — WARFARIN 2.5 MG/1
2.5 TABLET ORAL ONCE
Status: COMPLETED | OUTPATIENT
Start: 2020-07-11 | End: 2020-07-11

## 2020-07-10 RX ORDER — SODIUM CHLORIDE 9 MG/ML
INJECTION, SOLUTION INTRAVENOUS CONTINUOUS PRN
Status: DISCONTINUED | OUTPATIENT
Start: 2020-07-10 | End: 2020-07-10

## 2020-07-10 RX ORDER — IODIXANOL 320 MG/ML
INJECTION, SOLUTION INTRAVASCULAR
Status: DISCONTINUED | OUTPATIENT
Start: 2020-07-10 | End: 2020-07-10

## 2020-07-10 RX ORDER — LIDOCAINE HCL/PF 100 MG/5ML
SYRINGE (ML) INTRAVENOUS
Status: DISCONTINUED | OUTPATIENT
Start: 2020-07-10 | End: 2020-07-10

## 2020-07-10 RX ORDER — DOBUTAMINE HYDROCHLORIDE 200 MG/100ML
INJECTION INTRAVENOUS CONTINUOUS PRN
Status: DISCONTINUED | OUTPATIENT
Start: 2020-07-10 | End: 2020-07-10

## 2020-07-10 RX ORDER — HYDROMORPHONE HYDROCHLORIDE 1 MG/ML
INJECTION, SOLUTION INTRAMUSCULAR; INTRAVENOUS; SUBCUTANEOUS
Status: COMPLETED
Start: 2020-07-10 | End: 2020-07-10

## 2020-07-10 RX ORDER — FENTANYL CITRATE 50 UG/ML
25 INJECTION, SOLUTION INTRAMUSCULAR; INTRAVENOUS EVERY 5 MIN PRN
Status: DISCONTINUED | OUTPATIENT
Start: 2020-07-10 | End: 2020-07-10 | Stop reason: HOSPADM

## 2020-07-10 RX ORDER — PROPOFOL 10 MG/ML
VIAL (ML) INTRAVENOUS
Status: DISCONTINUED | OUTPATIENT
Start: 2020-07-10 | End: 2020-07-10

## 2020-07-10 RX ORDER — ROCURONIUM BROMIDE 10 MG/ML
INJECTION, SOLUTION INTRAVENOUS
Status: DISCONTINUED | OUTPATIENT
Start: 2020-07-10 | End: 2020-07-10

## 2020-07-10 RX ORDER — NEOSTIGMINE METHYLSULFATE 0.5 MG/ML
INJECTION, SOLUTION INTRAVENOUS
Status: DISCONTINUED | OUTPATIENT
Start: 2020-07-10 | End: 2020-07-10

## 2020-07-10 RX ORDER — DEXAMETHASONE SODIUM PHOSPHATE 4 MG/ML
INJECTION, SOLUTION INTRA-ARTICULAR; INTRALESIONAL; INTRAMUSCULAR; INTRAVENOUS; SOFT TISSUE
Status: DISCONTINUED | OUTPATIENT
Start: 2020-07-10 | End: 2020-07-10

## 2020-07-10 RX ORDER — WARFARIN SODIUM 5 MG/1
5 TABLET ORAL DAILY
Status: DISCONTINUED | OUTPATIENT
Start: 2020-07-12 | End: 2020-07-12

## 2020-07-10 RX ORDER — DIPHENHYDRAMINE HYDROCHLORIDE 50 MG/ML
25 INJECTION INTRAMUSCULAR; INTRAVENOUS EVERY 6 HOURS PRN
Status: DISCONTINUED | OUTPATIENT
Start: 2020-07-10 | End: 2020-07-10 | Stop reason: HOSPADM

## 2020-07-10 RX ORDER — FENTANYL CITRATE 50 UG/ML
INJECTION, SOLUTION INTRAMUSCULAR; INTRAVENOUS
Status: DISCONTINUED | OUTPATIENT
Start: 2020-07-10 | End: 2020-07-10

## 2020-07-10 RX ADMIN — MIDAZOLAM HYDROCHLORIDE 2 MG: 1 INJECTION, SOLUTION INTRAMUSCULAR; INTRAVENOUS at 09:07

## 2020-07-10 RX ADMIN — Medication 30 MG: at 09:07

## 2020-07-10 RX ADMIN — CEFEPIME 1 G: 1 INJECTION, POWDER, FOR SOLUTION INTRAMUSCULAR; INTRAVENOUS at 10:07

## 2020-07-10 RX ADMIN — EPHEDRINE SULFATE 5 MG: 50 INJECTION, SOLUTION INTRAMUSCULAR; INTRAVENOUS; SUBCUTANEOUS at 11:07

## 2020-07-10 RX ADMIN — HYDROMORPHONE HYDROCHLORIDE 0.2 MG: 1 INJECTION, SOLUTION INTRAMUSCULAR; INTRAVENOUS; SUBCUTANEOUS at 06:07

## 2020-07-10 RX ADMIN — POTASSIUM CHLORIDE 20 MEQ: 1500 TABLET, EXTENDED RELEASE ORAL at 10:07

## 2020-07-10 RX ADMIN — PHENYLEPHRINE HYDROCHLORIDE 200 MCG: 10 INJECTION INTRAVENOUS at 01:07

## 2020-07-10 RX ADMIN — EPHEDRINE SULFATE 10 MG: 50 INJECTION, SOLUTION INTRAMUSCULAR; INTRAVENOUS; SUBCUTANEOUS at 10:07

## 2020-07-10 RX ADMIN — NEOSTIGMINE METHYLSULFATE 5 MG: 0.5 INJECTION INTRAVENOUS at 03:07

## 2020-07-10 RX ADMIN — AMIODARONE HYDROCHLORIDE 200 MG: 200 TABLET ORAL at 08:07

## 2020-07-10 RX ADMIN — TORSEMIDE 20 MG: 20 TABLET ORAL at 08:07

## 2020-07-10 RX ADMIN — ASPIRIN 81 MG: 81 TABLET, COATED ORAL at 08:07

## 2020-07-10 RX ADMIN — LIDOCAINE HYDROCHLORIDE 20 MG: 20 INJECTION, SOLUTION INTRAVENOUS at 09:07

## 2020-07-10 RX ADMIN — POTASSIUM CHLORIDE 20 MEQ: 1500 TABLET, EXTENDED RELEASE ORAL at 08:07

## 2020-07-10 RX ADMIN — EPHEDRINE SULFATE 15 MG: 50 INJECTION, SOLUTION INTRAMUSCULAR; INTRAVENOUS; SUBCUTANEOUS at 12:07

## 2020-07-10 RX ADMIN — CEFEPIME 1 G: 1 INJECTION, POWDER, FOR SOLUTION INTRAMUSCULAR; INTRAVENOUS at 08:07

## 2020-07-10 RX ADMIN — ROCURONIUM BROMIDE 20 MG: 10 INJECTION, SOLUTION INTRAVENOUS at 11:07

## 2020-07-10 RX ADMIN — EPHEDRINE SULFATE 5 MG: 50 INJECTION, SOLUTION INTRAMUSCULAR; INTRAVENOUS; SUBCUTANEOUS at 12:07

## 2020-07-10 RX ADMIN — ROCURONIUM BROMIDE 20 MG: 10 INJECTION, SOLUTION INTRAVENOUS at 12:07

## 2020-07-10 RX ADMIN — WARFARIN SODIUM 5 MG: 5 TABLET ORAL at 07:07

## 2020-07-10 RX ADMIN — FENTANYL CITRATE 25 MCG: 50 INJECTION, SOLUTION INTRAMUSCULAR; INTRAVENOUS at 11:07

## 2020-07-10 RX ADMIN — MELATONIN 1000 UNITS: at 08:07

## 2020-07-10 RX ADMIN — ROCURONIUM BROMIDE 100 MG: 10 INJECTION, SOLUTION INTRAVENOUS at 09:07

## 2020-07-10 RX ADMIN — EPHEDRINE SULFATE 5 MG: 50 INJECTION, SOLUTION INTRAMUSCULAR; INTRAVENOUS; SUBCUTANEOUS at 10:07

## 2020-07-10 RX ADMIN — SODIUM CHLORIDE: 0.9 INJECTION, SOLUTION INTRAVENOUS at 10:07

## 2020-07-10 RX ADMIN — PHENYLEPHRINE HYDROCHLORIDE 100 MCG: 10 INJECTION INTRAVENOUS at 10:07

## 2020-07-10 RX ADMIN — AMLODIPINE BESYLATE 10 MG: 10 TABLET ORAL at 08:07

## 2020-07-10 RX ADMIN — FENTANYL CITRATE 50 MCG: 50 INJECTION, SOLUTION INTRAMUSCULAR; INTRAVENOUS at 02:07

## 2020-07-10 RX ADMIN — GLYCOPYRROLATE 0.4 MG: 0.2 INJECTION INTRAMUSCULAR; INTRAVENOUS at 03:07

## 2020-07-10 RX ADMIN — DOBUTAMINE HYDROCHLORIDE 2.5 MCG/KG/MIN: 200 INJECTION INTRAVENOUS at 09:07

## 2020-07-10 RX ADMIN — FENTANYL CITRATE 50 MCG: 50 INJECTION, SOLUTION INTRAMUSCULAR; INTRAVENOUS at 09:07

## 2020-07-10 RX ADMIN — PHENYLEPHRINE HYDROCHLORIDE 100 MCG: 10 INJECTION INTRAVENOUS at 12:07

## 2020-07-10 RX ADMIN — PROPOFOL 80 MG: 10 INJECTION, EMULSION INTRAVENOUS at 09:07

## 2020-07-10 RX ADMIN — Medication 10 MG: at 01:07

## 2020-07-10 RX ADMIN — SODIUM CHLORIDE, SODIUM GLUCONATE, SODIUM ACETATE, POTASSIUM CHLORIDE, MAGNESIUM CHLORIDE, SODIUM PHOSPHATE, DIBASIC, AND POTASSIUM PHOSPHATE: .53; .5; .37; .037; .03; .012; .00082 INJECTION, SOLUTION INTRAVENOUS at 10:07

## 2020-07-10 RX ADMIN — DOXAZOSIN 8 MG: 8 TABLET ORAL at 08:07

## 2020-07-10 RX ADMIN — PHENYLEPHRINE HYDROCHLORIDE 100 MCG: 10 INJECTION INTRAVENOUS at 11:07

## 2020-07-10 RX ADMIN — ROCURONIUM BROMIDE 10 MG: 10 INJECTION, SOLUTION INTRAVENOUS at 11:07

## 2020-07-10 RX ADMIN — PREDNISONE 2.5 MG: 2.5 TABLET ORAL at 08:07

## 2020-07-10 RX ADMIN — COLCHICINE 0.6 MG: 0.6 TABLET, FILM COATED ORAL at 08:07

## 2020-07-10 RX ADMIN — SODIUM CHLORIDE, SODIUM GLUCONATE, SODIUM ACETATE, POTASSIUM CHLORIDE, MAGNESIUM CHLORIDE, SODIUM PHOSPHATE, DIBASIC, AND POTASSIUM PHOSPHATE: .53; .5; .37; .037; .03; .012; .00082 INJECTION, SOLUTION INTRAVENOUS at 09:07

## 2020-07-10 RX ADMIN — DEXAMETHASONE SODIUM PHOSPHATE 4 MG: 4 INJECTION, SOLUTION INTRAMUSCULAR; INTRAVENOUS at 10:07

## 2020-07-10 RX ADMIN — CEFEPIME 1 G: 1 INJECTION, POWDER, FOR SOLUTION INTRAMUSCULAR; INTRAVENOUS at 12:07

## 2020-07-10 RX ADMIN — DOBUTAMINE HYDROCHLORIDE 2.5 MCG/KG/MIN: 200 INJECTION INTRAVENOUS at 01:07

## 2020-07-10 RX ADMIN — PAROXETINE HYDROCHLORIDE 10 MG: 10 TABLET, FILM COATED ORAL at 08:07

## 2020-07-10 RX ADMIN — ROCURONIUM BROMIDE 20 MG: 10 INJECTION, SOLUTION INTRAVENOUS at 01:07

## 2020-07-10 RX ADMIN — ALLOPURINOL 100 MG: 100 TABLET ORAL at 08:07

## 2020-07-10 RX ADMIN — SODIUM CHLORIDE: 0.9 INJECTION, SOLUTION INTRAVENOUS at 01:07

## 2020-07-10 RX ADMIN — ONDANSETRON 4 MG: 2 INJECTION, SOLUTION INTRAMUSCULAR; INTRAVENOUS at 03:07

## 2020-07-10 RX ADMIN — Medication 10 ML: at 12:07

## 2020-07-10 NOTE — PROGRESS NOTES
I,Asael Sal, rounded on Tim Richards to ensure all mechanical assist device settings (IABP or VAD) were appropriate and all parameters were within limits.  I was able to ensure all back up equipment was present, the staff had no issues, and the Perfusion Department daily rounding was complete.     9:28 AM

## 2020-07-10 NOTE — ASSESSMENT & PLAN NOTE
1. BRITTANY for assistance with device extraction  -No absolute contraindications of esophageal stricture, tumor, perforation, laceration,or diverticulum and/or active GI bleed  -The risks, benefits & alternatives of the procedure were explained to the patient.   -The risks of transesophageal echo include but are not limited to:  Dental trauma, esophageal trauma/perforation, bleeding, laryngospasm/brochospasm, aspiration, sore throat/hoarseness, & dislodgement of the endotracheal tube/nasogastric tube (where applicable).    -The risks of moderate sedation include hypotension, respiratory depression, arrhythmias, bronchospasm, & death.    -Informed consent was obtained. The patient is agreeable to proceed with the procedure and all questions and concerns addressed.    Case to be discussed with an attending in echocardiography lab.     Further recommendations per attending addendum

## 2020-07-10 NOTE — TRANSFER OF CARE
"Anesthesia Transfer of Care Note    Patient: Tim Richards    Procedure(s) Performed: Procedure(s) (LRB):  EXTRACTION, ELECTRODE LEAD (N/A)  ECHOCARDIOGRAM,TRANSESOPHAGEAL (N/A)    Patient location: PACU    Anesthesia Type: general    Transport from OR: Transported from OR on 2-3 L/min O2 by NC with adequate spontaneous ventilation    Post pain: adequate analgesia    Post assessment: no apparent anesthetic complications and tolerated procedure well    Post vital signs: stable    Level of consciousness: awake, alert and oriented    Nausea/Vomiting: no nausea/vomiting    Complications: none          Last vitals:   Visit Vitals  BP (!) 92/0   Pulse (!) 111   Temp 36.6 °C (97.8 °F) (Oral)   Resp (!) 21   Ht 6' 1" (1.854 m)   Wt 121.1 kg (267 lb)   SpO2 95%   BMI 35.23 kg/m²     "

## 2020-07-10 NOTE — PROGRESS NOTES
Ivette Guerrier (LVAD Coordinator) called and updated.  All vad numbers discussed.  No interventions needed.

## 2020-07-10 NOTE — CONSULTS
[facility]  Cardiology  Consult Note    Patient Name: Tim Richards  MRN: [id number]  Admission Date: 7/1/2020  Hospital Length of Stay: 9 days  Code Status: Full Code   Attending Provider: Amparo Lopez MD   Consulting Provider: Kole Taylor MD  Primary Care Physician: Power Connors MD  Principal Problem:ICD (implantable cardioverter-defibrillator) infection    Patient information was obtained from patient, past medical records and ER records.     Consults  Subjective:     Chief Complaint:  Device infection     HPI:   Tim Richards is a 54 y.o. male with DCM (EF 10%, grade III DD) s/p HM II with Outflow graft changed (03/9/18) as DT, BiV-Icd [employer] (2014 Dr. Chiu), prior history of VT/VF presenting to device clinic 07/01/2020 c/o incision site draining and getting shocked on Saturday 6/27/20. Wife reports yellow pus oozing from site this past week. Now admitted, initially on broad spectrum abx, transitioned to cefepime s/p negative blood cx. Wound cx positive for enterobacter, fusobacteria and prevotella. No complaints this AM. INR mostly 1.8-2.4 but as low as 1.4 7/2.    Dysphagia or odynophagia:  No  Liver Disease, esophageal disease, or known varices:  No  Upper GI Bleeding: No  Snoring:  No  Sleep Apnea:  No  Prior neck surgery or radiation:  No  History of anesthetic difficulties:  No  Family history of anesthetic difficulties:  No  Last oral intake:  12 hours ago  Able to move neck in all directions:  Yes    TTE 7/5:  · Eccentric left ventricular hypertrophy. Severely decreased left ventricular systolic function. The estimated ejection fraction is 10%.  · Grade I (mild) left ventricular diastolic dysfunction consistent with impaired relaxation.  · Moderate left atrial enlargement.  · Small posterior pericardial effusion.  · Mild mitral regurgitation.  · Mild tricuspid regurgitation.  · LVAD present. Base speed is 9800 RPMs. The pump type is a Heartmate II. The  interventricular septum appears midline. LVEDD 9.5cm. Unable to comment on AV  · RV not well seen. Appears normal in size on limited images.    Past Medical History:   Diagnosis Date    Anticoagulant long-term use     CHF (congestive heart failure)     Dilated cardiomyopathy 1/10/2018    Disorder of kidney and ureter     CKD    Encounter for blood transfusion     Gout     HTN (hypertension)     Thyroid disease     Ventricular tachycardia (paroxysmal)        Past Surgical History:   Procedure Laterality Date    CARDIAC CATHETERIZATION  Dec. 2012    CARDIAC DEFIBRILLATOR PLACEMENT Left     CRRT-D    COLONOSCOPY N/A 3/6/2018    Procedure: COLONOSCOPY;  Surgeon: Alonso Bone MD;  Location: [facility] ENDO (2ND FLR);  Service: Endoscopy;  Laterality: N/A;    COLONOSCOPY N/A 7/17/2019    Procedure: COLONOSCOPY;  Surgeon: Blane Valdez MD;  Location: [facility] ENDO (2ND FLR);  Service: Endoscopy;  Laterality: N/A;    COLONOSCOPY N/A 7/18/2019    Procedure: COLONOSCOPY;  Surgeon: Blane Valdez MD;  Location: [facility] ENDO (2ND FLR);  Service: Endoscopy;  Laterality: N/A;    ESOPHAGOGASTRODUODENOSCOPY N/A 7/17/2019    Procedure: EGD (ESOPHAGOGASTRODUODENOSCOPY);  Surgeon: Blane Valdez MD;  Location: [facility] ENDO (2ND FLR);  Service: Endoscopy;  Laterality: N/A;    ESOPHAGOGASTRODUODENOSCOPY N/A 7/18/2019    Procedure: EGD (ESOPHAGOGASTRODUODENOSCOPY);  Surgeon: Blane Valdez MD;  Location: [facility] ENDO (2ND FLR);  Service: Endoscopy;  Laterality: N/A;    NONINVASIVE CARDIAC ELECTROPHYSIOLOGY STUDY N/A 10/18/2019    Procedure: CARDIAC ELECTROPHYSIOLOGY STUDY, NONINVASIVE;  Surgeon: Raz Wagner MD;  Location: [facility] EP LAB;  Service: Cardiology;  Laterality: N/A;  VT, DFTs, MDT CRTD in situ, LVAD, anes, MB, 3098    REPLACEMENT OF IMPLANTABLE CARDIOVERTER-DEFIBRILLATOR (ICD) GENERATOR N/A 3/9/2020    Procedure: REPLACEMENT, ICD GENERATOR;  Surgeon: Harry Yun MD;  Location: [facility] EP LAB;  Service: Cardiology;  Laterality: N/A;   VT, ICD Gen Change and Lead Revision, MDT, MAC, DM,3 Prep    REVISION OF IMPLANTABLE CARDIOVERTER-DEFIBRILLATOR (ICD) ELECTRODE LEAD PLACEMENT N/A 3/9/2020    Procedure: REVISION, INSERTION, ELECTRODE LEAD, ICD;  Surgeon: Harry Yun MD;  Location: [facility] EP LAB;  Service: Cardiology;  Laterality: N/A;  VT, ICD Gen Change and Lead Revision, MDT, MAC, DM,3 Prep    TREATMENT OF CARDIAC ARRHYTHMIA  10/18/2019    Procedure: Cardioversion or Defibrillation;  Surgeon: Raz Wagner MD;  Location: [facility] EP LAB;  Service: Cardiology;;       Review of patient's allergies indicates:   Allergen Reactions    Lisinopril Anaphylaxis    Hydralazine analogues      Chronic constipation, impotence, dizziness       No current facility-administered medications on file prior to encounter.      Current Outpatient Medications on File Prior to Encounter   Medication Sig    allopurinol (ZYLOPRIM) 100 MG tablet TAKE 1 TABLET BY MOUTH EVERY DAY (Patient taking differently: Take 100 mg by mouth nightly. )    amiodarone (PACERONE) 400 MG tablet Take 1 tablet (400 mg total) by mouth once daily. (Patient taking differently: Take 200 mg by mouth once daily. )    amLODIPine (NORVASC) 10 MG tablet Take 1 tablet (10 mg total) by mouth once daily.    doxazosin (CARDURA) 8 MG Tab Take 1 tablet (8 mg total) by mouth every 12 (twelve) hours.    [] doxycycline (VIBRAMYCIN) 100 MG Cap Take 1 capsule (100 mg total) by mouth 2 (two) times daily. for 10 days    ferrous gluconate 324 mg (37.5 mg iron) Tab tablet Take 1 tablet (324 mg total) by mouth daily with breakfast. (Patient taking differently: Take 324 mg by mouth daily with lunch. )    guanFACINE (TENEX) 1 MG Tab Take 1 tablet (1 mg total) by mouth every evening.    pantoprazole (PROTONIX) 40 MG tablet TAKE 1 TABLET (40 MG TOTAL) BY MOUTH ONCE DAILY. (Patient taking differently: Take 40 mg by mouth daily with lunch. )    paroxetine (PAXIL) 10 MG tablet Take 1 tablet (10 mg  total) by mouth every morning.    potassium chloride SA (K-DUR,KLOR-CON) 20 MEQ tablet Take 20 mEq by mouth 2 (two) times daily.    [] predniSONE (DELTASONE) 2.5 MG tablet Take 3 tablets (7.5 mg total) by mouth once daily for 10 days, THEN 2 tablets (5 mg total) once daily for 10 days, THEN 1 tablet (2.5 mg total) once daily for 15 days. Then discontinue..    spironolactone (ALDACTONE) 25 MG tablet Take 1 tablet (25 mg total) by mouth once daily. (Patient taking differently: Take 25 mg by mouth daily with lunch. )    torsemide (DEMADEX) 20 MG Tab TAKE 2 TABS BY MOUTH EVERY MORNING AND 1 TAB EVERY EVENING FOR 3 DAYS THEN 2 TABS DAILY THEREAFTER (Patient taking differently: Take 40 mg by mouth once daily. TAKE 2 TABS BY MOUTH EVERY MORNING AND 1 TAB EVERY EVENING FOR 3 DAYS THEN 2 TABS DAILY THEREAFTER)    vitamin D (VITAMIN D3) 1000 units Tab Take 1,000 Units by mouth once daily.    zolpidem (AMBIEN) 10 mg Tab Take 1 tablet (10 mg total) by mouth nightly as needed.    aspirin (ECOTRIN) 81 MG EC tablet Take 1 tablet (81 mg total) by mouth once daily.    fluticasone propionate (FLONASE) 50 mcg/actuation nasal spray 2 sprays (100 mcg total) by Each Nostril route once daily.    sildenafiL (VIAGRA) 100 MG tablet Take 1 tablet (100 mg total) by mouth daily as needed for Erectile Dysfunction.    warfarin (COUMADIN) 5 MG tablet Take 5mg orally daily except 2.5mg orally on Monday /  (Patient taking differently: Take 2.5 mg by mouth. Take 5mg orally daily except 2.5mg orally on  and )     Family History     Problem Relation (Age of Onset)    Cancer Sister (54)    Coronary artery disease Father    Diabetes Father    Heart disease Father    Hypertension Father    No Known Problems Mother, Brother        Tobacco Use    Smoking status: Former Smoker     Packs/day: 1.00     Years: 31.00     Pack years: 31.00     Types: Cigarettes     Quit date: 2018     Years since quittin.4     Smokeless tobacco: Never Used    Tobacco comment: pt is quiting on his own - pt stated not qualified for program; 2/16 pt  quit on his own   Substance and Sexual Activity    Alcohol use: No     Alcohol/week: 0.0 standard drinks     Frequency: Never     Drinks per session: Patient refused     Binge frequency: Never     Comment: one fifth of gin or rum/week    Drug use: No    Sexual activity: Yes     Partners: Female     Birth control/protection: None     Comment: 10/5/17  with same partner 7 years      Review of Systems   Constitution: Negative. Negative for chills and fever.   HENT: Negative.    Eyes: Negative.    Cardiovascular: Negative for chest pain, claudication and paroxysmal nocturnal dyspnea.        Recent shock, pus from generator site   Respiratory: Negative for cough, shortness of breath and wheezing.    Endocrine: Negative.    Hematologic/Lymphatic: Does not bruise/bleed easily.   Skin: Negative for nail changes and rash.   Musculoskeletal: Negative.  Negative for back pain.   Gastrointestinal: Negative for abdominal pain, melena, nausea and vomiting.   Neurological: Negative for dizziness and headaches.   Psychiatric/Behavioral: Negative for altered mental status, depression and substance abuse.   Allergic/Immunologic: Negative.      Objective:     Vital Signs (Most Recent):  Temp: 97.8 °F (36.6 °C) (07/10/20 0812)  Pulse: 65 (07/10/20 0810)  Resp: 16 (07/10/20 0433)  BP: (!) 88/0 (07/10/20 0433)  SpO2: (!) 93 % (07/10/20 0812) Vital Signs (24h Range):  Temp:  [97.5 °F (36.4 °C)-98.7 °F (37.1 °C)] 97.8 °F (36.6 °C)  Pulse:  [63-85] 65  Resp:  [16-18] 16  SpO2:  [93 %-98 %] 93 %  BP: (88-92)/(0) 88/0     Weight: 121.5 kg (267 lb 13.7 oz)  Body mass index is 35.34 kg/m².    SpO2: (!) 93 %  O2 Device (Oxygen Therapy): room air      Intake/Output Summary (Last 24 hours) at 7/10/2020 0818  Last data filed at 7/10/2020 0455  Gross per 24 hour   Intake 10 ml   Output 1150 ml   Net -1140 ml        Lines/Drains/Airways     Peripherally Inserted Central Catheter Line            PICC Double Lumen 07/06/20 1642 right basilic 3 days          Line                 VAD 03/08/18 1124 Left ventricular assist device HeartMate  days          Peripheral Intravenous Line                 Peripheral IV - Single Lumen 07/09/20 1455 20 G;1 1/4 in Left Antecubital less than 1 day                Physical Exam   Constitutional: He is oriented to person, place, and time. He appears well-developed and well-nourished.   HENT:   Head: Normocephalic and atraumatic.   Eyes: Pupils are equal, round, and reactive to light. Conjunctivae and EOM are normal.   Neck: No JVD present.   Cardiovascular: Exam reveals no gallop and no friction rub.   No murmur heard.  LVAD hum   Pulmonary/Chest: Effort normal. No stridor. He has no wheezes. He has no rales. He exhibits no tenderness.   Abdominal: Soft. Bowel sounds are normal. He exhibits no distension and no mass. There is no abdominal tenderness. There is no guarding.   Musculoskeletal:         General: No tenderness, deformity or edema.   Neurological: He is alert and oriented to person, place, and time.   Skin: Skin is warm and dry. No rash noted. No erythema.   Psychiatric: He has a normal mood and affect.       Significant Labs:   CMP   Recent Labs   Lab 07/09/20  0605 07/10/20  0535   * 139   K 4.3 4.1    105   CO2 24 27   GLU 85 86   BUN 12 12   CREATININE 1.5* 1.8*   CALCIUM 9.0 9.5   PROT  --  7.3   ALBUMIN  --  3.5   BILITOT  --  0.4   ALKPHOS  --  93   AST  --  30   ALT  --  24   ANIONGAP 6* 7*   ESTGFRAFRICA >60.0 48.6*   EGFRNONAA 52.4* 42.0*    and CBC   Recent Labs   Lab 07/09/20  0605 07/10/20  0535   WBC 5.73 5.35   HGB 11.4* 11.9*   HCT 36.8* 39.1*    225       Significant Imaging: Echocardiogram:   Transthoracic echo (TTE) complete (Cupid Only):   Results for orders placed or performed during the hospital encounter of 07/01/20   Echo Color Flow  Doppler? Yes   Result Value Ref Range    Ascending aorta 3.34 cm    STJ 2.96 cm    IVS 1.04 0.6 - 1.1 cm    LA size 3.90 cm    Left Atrium Major Axis 6.81 cm    Left Atrium Minor Axis 6.81 cm    LVIDD 9.50 (A) 3.5 - 6.0 cm    LVIDS 9.37 (A) 2.1 - 4.0 cm    LVOT diameter 2.49 cm    PW 1.34 (A) 0.6 - 1.1 cm    MV Peak A Jorge 0.49 m/s    E wave decelartion time 112.55 msec    MV Peak E Jorge 0.78 m/s    RA Major Axis 5.76 cm    RA Width 4.15 cm    Sinus 3.16 cm    TR Max Jorge 2.09 m/s    TDI LATERAL 0.10 m/s    TDI SEPTAL 0.08 m/s    LA WIDTH 4.84 cm    MV stenosis pressure 1/2 time 32.64 ms    LV Diastolic Volume 547.08 mL    LV Systolic Volume 489.59 mL    RV S' 10.73 cm/s    LV LATERAL E/E' RATIO 7.80 m/s    LV SEPTAL E/E' RATIO 9.75 m/s    FS 1 %    LA volume 109.26 cm3    LV mass 682.26 g    Left Ventricle Relative Wall Thickness 0.28 cm    MV valve area p 1/2 method 6.74 cm2    E/A ratio 1.59     Mean e' 0.09 m/s    LVOT area 4.9 cm2    E/E' ratio 8.67 m/s    LV Systolic Volume Index 201.6 mL/m2    LV Diastolic Volume Index 225.22 mL/m2    LA Volume Index 45.0 mL/m2    LV Mass Index 281 g/m2    Triscuspid Valve Regurgitation Peak Gradient 17 mmHg    BSA 2.49 m2    Narrative    · Eccentric left ventricular hypertrophy. Severely decreased left   ventricular systolic function. The estimated ejection fraction is 10%.  · Grade I (mild) left ventricular diastolic dysfunction consistent with   impaired relaxation.  · Moderate left atrial enlargement.  · Small posterior pericardial effusion.  · Mild mitral regurgitation.  · Mild tricuspid regurgitation.  · LVAD present. Base speed is 9800 RPMs. The pump type is a Heartmate II.   The interventricular septum appears midline. LVEDD 9.5cm. Unable to   comment on AV  · RV not well seen. Appears normal in size on limited images.     There is a suggestion of a mobile mass on a lead in the right atrium   (image 47)       Assessment and Plan:     Infection involving implantable  cardioverter-defibrillator (ICD)  1. BRITTANY for assistance with device extraction  -No absolute contraindications of esophageal stricture, tumor, perforation, laceration,or diverticulum and/or active GI bleed  -The risks, benefits & alternatives of the procedure were explained to the patient.   -The risks of transesophageal echo include but are not limited to:  Dental trauma, esophageal trauma/perforation, bleeding, laryngospasm/brochospasm, aspiration, sore throat/hoarseness, & dislodgement of the endotracheal tube/nasogastric tube (where applicable).    -The risks of moderate sedation include hypotension, respiratory depression, arrhythmias, bronchospasm, & death.    -Informed consent was obtained. The patient is agreeable to proceed with the procedure and all questions and concerns addressed.    Case to be discussed with an attending in echocardiography lab.     Further recommendations per attending addendum          VTE Risk Mitigation (From admission, onward)         Ordered     warfarin (COUMADIN) tablet 5 mg  Daily      07/07/20 9376                Thank you for your consult. I will follow-up with patient. Please contact us if you have any additional questions.    Kole Taylor MD  Cardiology   [facility]

## 2020-07-10 NOTE — PROGRESS NOTES
POTENTIAL WEEKEND DISCHARGE    Pt not in room this AM as he is awaiting ICD extraction. SW met with Pt on 7/9 prior to surgery to discuss d/c needs. Pt reports spouse will transport Pt home when medically ready.  Pt reported coping adequately on 7/9 & denieed any needs or concerns to SW.    SW notified [facility] ([phone]  F: [phone]) of potential plan to d/c over the weekend and confirmed they have orders in place. SW notified [facility] (ph: [phone]) of potential plan to d/c over weekend & confirmed they have access to Epic orders.  SW providing ongoing psychosocial and counseling support, education, resources, assistance, and discharge planning as indicated.  SW remains available.

## 2020-07-10 NOTE — SUBJECTIVE & OBJECTIVE
Interval History: Patient seen and examined today at bedside. Wound c/s positive for Enterobacter cloacae from 7/2 and Fusobacterium and . Blood c/s from 7/2 and 7/3 negative.  ID f/u appreciated- DC Vancomycin, changed to IV cefepime, continue 2 weeks post device extraction. PICC on 7/6/2020.  EP - plan for ICD removal on Friday, CTS consulted for input and back up plan. EP follow up appreciated. Life vest on discharge.  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   LDH in 421 9 max at 640). Received 250 ml fluids on 7/4. Repeat TTE at 9800 with LV 9.5 cm, suggestion of a mobile mass on a lead in the atrium. LFT normal.  Will c/w coumadin. INR1.9 today.  Increase guafacine to 2 mg qhs.  Pt reluctant to have speed increase as he had symptoms in the past with speed change, no active symptoms. Will hold speed change for now.  Also he prefers not to uptitrate his BP medications as he is anxious and would like not to increase medications at this time. Informed risks of uncontrolled BP, pt aware of risks.   Gout- s/w colchicine improved.      Continuous Infusions:  Scheduled Meds:   allopurinoL  100 mg Oral Daily    amiodarone  200 mg Oral Daily    amLODIPine  10 mg Oral Daily    aspirin  81 mg Oral Daily    ceFAZolin (ANCEF) IVPB  1 g Intravenous Once    ceFEPime (MAXIPIME) IVPB  1 g Intravenous Q8H    colchicine  0.6 mg Oral Daily    doxazosin  8 mg Oral Q12H    ferrous gluconate  324 mg Oral Daily with lunch    fluticasone propionate  2 spray Each Nostril Daily    guanFACINE  2 mg Oral QHS    pantoprazole  40 mg Oral Daily with lunch    paroxetine  10 mg Oral QAM    potassium chloride SA  20 mEq Oral BID    predniSONE  2.5 mg Oral Daily    sodium chloride 0.9%  10 mL Intravenous Q6H    spironolactone  25 mg Oral Daily with lunch    torsemide  20 mg Oral Daily    vancomycin (VANCOCIN) IVPB  1,000 mg  Intravenous Once    vitamin D  1,000 Units Oral Daily    [START ON 7/11/2020] warfarin  2.5 mg Oral Once    [START ON 7/12/2020] warfarin  5 mg Oral Daily    warfarin  5 mg Oral Once     PRN Meds:sodium chloride, sodium chloride, sodium chloride, acetaminophen, bupivacaine (PF) 0.25% (2.5 mg/ml), diphenhydrAMINE, fentaNYL, HYDROmorphone, iodixanoL, lidocaine HCL 20 mg/ml (2%), polyethylene glycol, senna-docusate 8.6-50 mg, Flushing PICC Protocol **AND** sodium chloride 0.9% **AND** sodium chloride 0.9%, sodium chloride 0.9%, vancomycin, zolpidem    Review of patient's allergies indicates:   Allergen Reactions    Lisinopril Anaphylaxis    Hydralazine analogues      Chronic constipation, impotence, dizziness     Objective:     Vital Signs (Most Recent):  Temp: 97.8 °F (36.6 °C) (07/10/20 0812)  Pulse: (!) 111 (07/10/20 1615)  Resp: (!) 21 (07/10/20 1615)  BP: (!) 92/0 (07/10/20 0854)  SpO2: 95 % (07/10/20 1615) Vital Signs (24h Range):  Temp:  [97.5 °F (36.4 °C)-98.2 °F (36.8 °C)] 97.8 °F (36.6 °C)  Pulse:  [] 111  Resp:  [16-22] 21  SpO2:  [93 %-98 %] 95 %  BP: (88-92)/(0) 92/0     Patient Vitals for the past 72 hrs (Last 3 readings):   Weight   07/10/20 0854 121.1 kg (267 lb)   07/09/20 0800 121.5 kg (267 lb 13.7 oz)   07/08/20 0800 121.6 kg (268 lb 1.3 oz)     Body mass index is 35.23 kg/m².      Intake/Output Summary (Last 24 hours) at 7/10/2020 1624  Last data filed at 7/10/2020 1603  Gross per 24 hour   Intake 3610 ml   Output 2925 ml   Net 685 ml       Hemodynamic Parameters:       Telemetry: VAD artifact    Physical Exam  Vitals signs and nursing note reviewed.   Constitutional:       Appearance: Normal appearance.   HENT:      Head: Normocephalic and atraumatic.      Nose: No congestion.      Mouth/Throat:      Mouth: Mucous membranes are moist.   Eyes:      Extraocular Movements: Extraocular movements intact.      Pupils: Pupils are equal, round, and reactive to light.   Neck:       Musculoskeletal: Normal range of motion.   Cardiovascular:      Rate and Rhythm: Normal rate and regular rhythm.      Comments: Smooth VAD hum  Pulmonary:      Effort: Pulmonary effort is normal. No respiratory distress.      Breath sounds: Normal breath sounds. No rales.   Abdominal:      General: Abdomen is flat. Bowel sounds are normal. There is no distension.      Tenderness: There is no abdominal tenderness. There is no guarding.   Musculoskeletal:         General: No swelling.   Skin:     General: Skin is warm.      Comments: Tenderness, erythema, and redness over the ICD pocket site   Neurological:      General: No focal deficit present.      Mental Status: He is alert.   Psychiatric:         Mood and Affect: Mood normal.         Significant Labs:  CBC:  Recent Labs   Lab 07/08/20  0511 07/09/20  0605 07/10/20  0535   WBC 5.89 5.73 5.35   RBC 4.48* 4.21* 4.41*   HGB 12.2* 11.4* 11.9*   HCT 39.6* 36.8* 39.1*    214 225   MCV 88 87 89   MCH 27.2 27.1 27.0   MCHC 30.8* 31.0* 30.4*     BNP:  Recent Labs   Lab 07/06/20  0702 07/08/20  0511 07/10/20  0535   * 74 141*     CMP:  Recent Labs   Lab 07/06/20  0701  07/08/20 0511 07/09/20  0605 07/10/20  0535   GLU 89   < > 84 85 86   CALCIUM 9.3   < > 9.3 9.0 9.5   ALBUMIN 3.4*  --  3.5  --  3.5   PROT 7.1  --  7.4  --  7.3   *   < > 136 135* 139   K 4.1   < > 3.7 4.3 4.1   CO2 23   < > 24 24 27      < > 102 105 105   BUN 15   < > 14 12 12   CREATININE 1.6*   < > 1.7* 1.5* 1.8*   ALKPHOS 98  --  94  --  93   ALT 21  --  23  --  24   AST 24  --  30  --  30   BILITOT 0.4  --  0.5  --  0.4    < > = values in this interval not displayed.      Coagulation:   Recent Labs   Lab 07/08/20  0511 07/09/20  0605 07/10/20  0535   INR 2.1* 1.9* 2.1*   APTT 34.8* 34.9* 34.7*     LDH:  Recent Labs   Lab 07/08/20  0511 07/09/20  0605 07/10/20  0535   * 416* 421*     Microbiology:  Microbiology Results (last 7 days)     Procedure Component Value Units  Date/Time    Aerobic culture [[id number]] Collected: 07/10/20 1433    Order Status: Sent Specimen: Incision site from Heart Updated: 07/10/20 1441    Culture, Anaerobe [[id number]] Collected: 07/10/20 1433    Order Status: Sent Specimen: Incision site from Heart Updated: 07/10/20 1441    Culture, Anaerobe [[id number]] Collected: 07/10/20 1409    Order Status: Sent Specimen: Incision site from Heart Updated: 07/10/20 1420    Aerobic culture [[id number]] Collected: 07/10/20 1409    Order Status: Sent Specimen: Incision site from Heart Updated: 07/10/20 1419    Aerobic culture [[id number]] Collected: 07/10/20 1344    Order Status: Sent Specimen: Incision site from Heart Updated: 07/10/20 1405    Culture, Anaerobe [[id number]] Collected: 07/10/20 1344    Order Status: Sent Specimen: Incision site from Heart Updated: 07/10/20 1404    Culture, Anaerobe [[id number]] Collected: 07/10/20 1237    Order Status: Sent Specimen: Incision site from Heart Updated: 07/10/20 1254    Aerobic culture [[id number]] Collected: 07/10/20 1237    Order Status: Sent Specimen: Incision site from Heart Updated: 07/10/20 1254    Culture, Anaerobe [[id number]] Collected: 07/01/20 1741    Order Status: Completed Specimen: Incision site from Chest, Left Updated: 07/09/20 0906     Anaerobic Culture No anaerobes isolated    Culture, Anaerobe [[id number]] Collected: 07/01/20 1741    Order Status: Completed Specimen: Skin from Abdomen Updated: 07/09/20 0742     Anaerobic Culture No anaerobes isolated    Narrative:      Drive line site    Blood culture [[id number]] Collected: 07/03/20 0712    Order Status: Completed Specimen: Blood Updated: 07/08/20 0812     Blood Culture, Routine No growth after 5 days.    Blood culture [[id number]] Collected: 07/03/20 0711    Order Status: Completed Specimen: Blood Updated: 07/08/20 0812     Blood Culture, Routine No growth after 5 days.    Blood culture [[id number]] Collected: 07/02/20 1110    Order Status: Completed Specimen:  Blood Updated: 07/07/20 1412     Blood Culture, Routine No growth after 5 days.    Culture, Anaerobe [[id number]]  (Abnormal) Collected: 07/01/20 2153    Order Status: Completed Specimen: Wound from Abdomen Updated: 07/07/20 1316     Anaerobic Culture FUSOBACTERIUM NUCLEATUM  Moderate        PREVOTELLA (B.) MELANINOGENICA  Moderate      Narrative:      Left chest incision.    Aerobic culture [[id number]]  (Abnormal)  (Susceptibility) Collected: 07/01/20 2153    Order Status: Completed Specimen: Wound from Abdomen Updated: 07/04/20 1214     Aerobic Bacterial Culture ENTEROBACTER CLOACAE  Moderate  No other significant isolate      Narrative:      Left chest incision          BMP:   Recent Labs   Lab 07/10/20  0535   GLU 86      K 4.1      CO2 27   BUN 12   CREATININE 1.8*   CALCIUM 9.5   MG 2.0     Cardiac Markers: No results for input(s): CKMB, TROPONINT, MYOGLOBIN in the last 72 hours.  Coagulation:   Recent Labs   Lab 07/10/20  0535   INR 2.1*   APTT 34.7*     I have reviewed all pertinent labs within the past 24 hours.    Estimated Creatinine Clearance: 64.7 mL/min (A) (based on SCr of 1.8 mg/dL (H)).    Diagnostic Results:  I have reviewed all pertinent imaging results/findings within the past 24 hours.

## 2020-07-10 NOTE — ANESTHESIA PROCEDURE NOTES
Arterial    Diagnosis: infected AICD    Patient location during procedure: done in OR  Procedure start time: 7/10/2020 9:45 AM  Procedure end time: 7/10/2020 9:49 AM    Staffing  Authorizing Provider: Grover Puente MD  Performing Provider: Grover Puente MD    Anesthesiologist was present at the time of the procedure.    Preanesthetic Checklist  Completed: patient identified, site marked, surgical consent, pre-op evaluation, timeout performed, IV checked, risks and benefits discussed, monitors and equipment checked and anesthesia consent givenArterial  Skin Prep: chlorhexidine gluconate  Local Infiltration: lidocaine  Orientation: left  Location: radial  Catheter Size: 20 G  Catheter placement by Ultrasound guidance. Heme positive aspiration all ports.  Vessel Caliber: medium, patent  Needle advanced into vessel with real time Ultrasound guidance.Insertion Attempts: 1  Assessment  Dressing: secured with tape and tegaderm  Patient: Tolerated well            
Intubation  Performed by: Stephania Ferguson CRNA  Authorized by: Grover Puente MD     Intubation:     Induction:  Intravenous    Intubated:  Postinduction    Mask Ventilation:  Easy with oral airway    Attempts:  1    Attempted By:  CRNA    Method of Intubation:  Video laryngoscopy    Blade:  Booth 4    Laryngeal View Grade: Grade I - full view of chords      Difficult Airway Encountered?: No      Complications:  None    Airway Device:  Oral endotracheal tube    Airway Device Size:  7.5    Style/Cuff Inflation:  Cuffed (inflated to minimal occlusive pressure)    Inflation Amount (mL):  8    Tube secured:  24    Secured at:  The lips    Placement Verified By:  Capnometry    Complicating Factors:  Anterior larynx and obesity    Findings Post-Intubation:  BS equal bilateral        
Patient

## 2020-07-10 NOTE — ASSESSMENT & PLAN NOTE
LDH elevated likely from dehydration, improved. Trend 640 -> 585 -> 474 -> 445 -> 416. Received 250 ml fluids on 7/4. . LFT normal.  Will c/w coumadin. INR 1.9. Coumadin 5 mg. Given risk of GI bleed, patient not a candidate for heparin bridge. Also given concern risk of bleeding, hold off on starting persantine. C/W ASA 81 mg daily.  -Pt reluctant to have speed increase as he had symptoms in the past with speed change, no active symptoms. Will hold speed change for now.  Also he prefers not to uptitrate his BP medications as he is anxious and would like not to increase medications at this time. Informed risks of uncontrolled BP, pt aware of risks.   - INR 1.9 Goal of 2-3   - c/w coumadin  - patient denies any dark urine and no obvious scleral icterus on exam  - drive site clean and dry but will send site cultures.    Procedure: Device Interrogation Including analysis of device parameters  Current Settings: Ventricular Assist Device  Review of device function is stable/unstable stable    TXP LVAD INTERROGATIONS 7/10/2020 7/10/2020 7/10/2020 7/10/2020 7/9/2020 7/9/2020 7/9/2020   Type HeartMate II HeartMate II HeartMate II HeartMate II HeartMate II HeartMate II HeartMate II   Flow 5.0 4.7 4.3 4.3 5.0 5.1 4.7   Speed 9800 9800 9800 9800 9800 9800 9800   PI 1.5 3.0 4.1 2.8 3.8 4.3 3.6   Power (Jackson) 6.2 6.0 5.7 6.1 6.3 6.3 6.1   LSL - 9400 - 9400 9400 9400 9400   Pulsatility - No Pulse No Pulse No Pulse No Pulse Intermittent pulse Intermittent pulse

## 2020-07-10 NOTE — BRIEF OP NOTE
: Harry Yun MD    Post-operative Diagnosis: Infected CRT-D     Procedure Performed: total system extraction of cardiac implantable device    Description of Procedure: The patient was brought to the EP lab in the fasting state. Prepped and draped in sterile fashion. Safety timeout was performed. Sedation administered by anesthesia staff. Ultrasound guided access of the right femoral vein x2 and left femoral vein x1. Bridge balloon through 12 Fr sheath and wire in right femoral vein. 9 Fr sheath for central access for anesthesia. Lidocaine for local anesthetic at device site. Incision made. Blunt and electrosurgical dissection to the level of the existing generator. Pocket swabbed and sent for culture. Generator and leads dissected free and removed from pocket. Pocket washed with antibiotic solution. Leads prepped for extraction. Mechanical and laser lead extraction performed. All leads removed in tact. Tips sent for culture. Hemostasis achieved. Pocket closed.     EBL: 500 mL. 2 units pRBCs transfused intraoperatively.    Specimens Removed: None  Complications: no immediate    1. Antibiotics per home/ID regimen  2. NO HEPARIN PRODUCTS, warfarin can be continued  3. No lifting over 5 pounds  4. Dressing will be removed in AM by EP  5. Follow up in device clinic in 1 week for device and wound checks.     Dr Yun, the attending electrophysiologist, was present for the entirety of this procedure.    Daren Sanchez MD PGY8

## 2020-07-10 NOTE — NURSING TRANSFER
Nursing Transfer Note      7/10/2020     Transfer To: EP    Transfer via bed    Transfer with cardiac monitoring, VAD equipment    Transported by EP RN    Medicines sent: none    Chart send with patient: Yes    Notified: spouse    VAD equipment reconciled before leaving floor. Monitor and power module sent with pt as well.

## 2020-07-10 NOTE — PROGRESS NOTES
[facility]  Heart Transplant  Progress Note    Patient Name: Tim Richards  MRN: [id number]  Admission Date: 7/1/2020  Hospital Length of Stay: 9 days  Attending Physician: Amparo Lopez MD  Primary Care Provider: Power Connors MD  Principal Problem:ICD (implantable cardioverter-defibrillator) infection    Subjective:     Interval History: Patient seen and examined today at bedside. Wound c/s positive for Enterobacter cloacae from 7/2 and Fusobacterium and . Blood c/s from 7/2 and 7/3 negative.  ID f/u appreciated- DC Vancomycin, changed to IV cefepime, continue 2 weeks post device extraction. PICC on 7/6/2020.  EP - plan for ICD removal on Friday, CTS consulted for input and back up plan. EP follow up appreciated. Life vest on discharge.  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   LDH in 421 9 max at 640). Received 250 ml fluids on 7/4. Repeat TTE at 9800 with LV 9.5 cm, suggestion of a mobile mass on a lead in the atrium. LFT normal.  Will c/w coumadin. INR1.9 today.  Increase guafacine to 2 mg qhs.  Pt reluctant to have speed increase as he had symptoms in the past with speed change, no active symptoms. Will hold speed change for now.  Also he prefers not to uptitrate his BP medications as he is anxious and would like not to increase medications at this time. Informed risks of uncontrolled BP, pt aware of risks.   Gout- s/w colchicine improved.      Continuous Infusions:  Scheduled Meds:   allopurinoL  100 mg Oral Daily    amiodarone  200 mg Oral Daily    amLODIPine  10 mg Oral Daily    aspirin  81 mg Oral Daily    ceFAZolin (ANCEF) IVPB  1 g Intravenous Once    ceFEPime (MAXIPIME) IVPB  1 g Intravenous Q8H    colchicine  0.6 mg Oral Daily    doxazosin  8 mg Oral Q12H    ferrous gluconate  324 mg Oral Daily with lunch    fluticasone propionate  2 spray Each Nostril  Daily    guanFACINE  2 mg Oral QHS    pantoprazole  40 mg Oral Daily with lunch    paroxetine  10 mg Oral QAM    potassium chloride SA  20 mEq Oral BID    predniSONE  2.5 mg Oral Daily    sodium chloride 0.9%  10 mL Intravenous Q6H    spironolactone  25 mg Oral Daily with lunch    torsemide  20 mg Oral Daily    vancomycin (VANCOCIN) IVPB  1,000 mg Intravenous Once    vitamin D  1,000 Units Oral Daily    [START ON 7/11/2020] warfarin  2.5 mg Oral Once    [START ON 7/12/2020] warfarin  5 mg Oral Daily    warfarin  5 mg Oral Once     PRN Meds:sodium chloride, sodium chloride, sodium chloride, acetaminophen, bupivacaine (PF) 0.25% (2.5 mg/ml), diphenhydrAMINE, fentaNYL, HYDROmorphone, iodixanoL, lidocaine HCL 20 mg/ml (2%), polyethylene glycol, senna-docusate 8.6-50 mg, Flushing PICC Protocol **AND** sodium chloride 0.9% **AND** sodium chloride 0.9%, sodium chloride 0.9%, vancomycin, zolpidem    Review of patient's allergies indicates:   Allergen Reactions    Lisinopril Anaphylaxis    Hydralazine analogues      Chronic constipation, impotence, dizziness     Objective:     Vital Signs (Most Recent):  Temp: 97.8 °F (36.6 °C) (07/10/20 0812)  Pulse: (!) 111 (07/10/20 1615)  Resp: (!) 21 (07/10/20 1615)  BP: (!) 92/0 (07/10/20 0854)  SpO2: 95 % (07/10/20 1615) Vital Signs (24h Range):  Temp:  [97.5 °F (36.4 °C)-98.2 °F (36.8 °C)] 97.8 °F (36.6 °C)  Pulse:  [] 111  Resp:  [16-22] 21  SpO2:  [93 %-98 %] 95 %  BP: (88-92)/(0) 92/0     Patient Vitals for the past 72 hrs (Last 3 readings):   Weight   07/10/20 0854 121.1 kg (267 lb)   07/09/20 0800 121.5 kg (267 lb 13.7 oz)   07/08/20 0800 121.6 kg (268 lb 1.3 oz)     Body mass index is 35.23 kg/m².      Intake/Output Summary (Last 24 hours) at 7/10/2020 1624  Last data filed at 7/10/2020 1603  Gross per 24 hour   Intake 3610 ml   Output 2925 ml   Net 685 ml       Hemodynamic Parameters:       Telemetry: VAD artifact    Physical Exam  Vitals signs and nursing  note reviewed.   Constitutional:       Appearance: Normal appearance.   HENT:      Head: Normocephalic and atraumatic.      Nose: No congestion.      Mouth/Throat:      Mouth: Mucous membranes are moist.   Eyes:      Extraocular Movements: Extraocular movements intact.      Pupils: Pupils are equal, round, and reactive to light.   Neck:      Musculoskeletal: Normal range of motion.   Cardiovascular:      Rate and Rhythm: Normal rate and regular rhythm.      Comments: Smooth VAD hum  Pulmonary:      Effort: Pulmonary effort is normal. No respiratory distress.      Breath sounds: Normal breath sounds. No rales.   Abdominal:      General: Abdomen is flat. Bowel sounds are normal. There is no distension.      Tenderness: There is no abdominal tenderness. There is no guarding.   Musculoskeletal:         General: No swelling.   Skin:     General: Skin is warm.      Comments: Tenderness, erythema, and redness over the ICD pocket site   Neurological:      General: No focal deficit present.      Mental Status: He is alert.   Psychiatric:         Mood and Affect: Mood normal.         Significant Labs:  CBC:  Recent Labs   Lab 07/08/20  0511 07/09/20  0605 07/10/20  0535   WBC 5.89 5.73 5.35   RBC 4.48* 4.21* 4.41*   HGB 12.2* 11.4* 11.9*   HCT 39.6* 36.8* 39.1*    214 225   MCV 88 87 89   MCH 27.2 27.1 27.0   MCHC 30.8* 31.0* 30.4*     BNP:  Recent Labs   Lab 07/06/20  0702 07/08/20  0511 07/10/20  0535   * 74 141*     CMP:  Recent Labs   Lab 07/06/20  0701  07/08/20  0511 07/09/20  0605 07/10/20  0535   GLU 89   < > 84 85 86   CALCIUM 9.3   < > 9.3 9.0 9.5   ALBUMIN 3.4*  --  3.5  --  3.5   PROT 7.1  --  7.4  --  7.3   *   < > 136 135* 139   K 4.1   < > 3.7 4.3 4.1   CO2 23   < > 24 24 27      < > 102 105 105   BUN 15   < > 14 12 12   CREATININE 1.6*   < > 1.7* 1.5* 1.8*   ALKPHOS 98  --  94  --  93   ALT 21  --  23  --  24   AST 24  --  30  --  30   BILITOT 0.4  --  0.5  --  0.4    < > = values in  this interval not displayed.      Coagulation:   Recent Labs   Lab 07/08/20  0511 07/09/20  0605 07/10/20  0535   INR 2.1* 1.9* 2.1*   APTT 34.8* 34.9* 34.7*     LDH:  Recent Labs   Lab 07/08/20  0511 07/09/20  0605 07/10/20  0535   * 416* 421*     Microbiology:  Microbiology Results (last 7 days)     Procedure Component Value Units Date/Time    Aerobic culture [[id number]] Collected: 07/10/20 1433    Order Status: Sent Specimen: Incision site from Heart Updated: 07/10/20 1441    Culture, Anaerobe [[id number]] Collected: 07/10/20 1433    Order Status: Sent Specimen: Incision site from Heart Updated: 07/10/20 1441    Culture, Anaerobe [[id number]] Collected: 07/10/20 1409    Order Status: Sent Specimen: Incision site from Heart Updated: 07/10/20 1420    Aerobic culture [[id number]] Collected: 07/10/20 1409    Order Status: Sent Specimen: Incision site from Heart Updated: 07/10/20 1419    Aerobic culture [[id number]] Collected: 07/10/20 1344    Order Status: Sent Specimen: Incision site from Heart Updated: 07/10/20 1405    Culture, Anaerobe [[id number]] Collected: 07/10/20 1344    Order Status: Sent Specimen: Incision site from Heart Updated: 07/10/20 1404    Culture, Anaerobe [[id number]] Collected: 07/10/20 1237    Order Status: Sent Specimen: Incision site from Heart Updated: 07/10/20 1254    Aerobic culture [[id number]] Collected: 07/10/20 1237    Order Status: Sent Specimen: Incision site from Heart Updated: 07/10/20 1254    Culture, Anaerobe [[id number]] Collected: 07/01/20 1741    Order Status: Completed Specimen: Incision site from Chest, Left Updated: 07/09/20 0906     Anaerobic Culture No anaerobes isolated    Culture, Anaerobe [[id number]] Collected: 07/01/20 1741    Order Status: Completed Specimen: Skin from Abdomen Updated: 07/09/20 0742     Anaerobic Culture No anaerobes isolated    Narrative:      Drive line site    Blood culture [[id number]] Collected: 07/03/20 0712    Order Status: Completed Specimen:  Blood Updated: 07/08/20 0812     Blood Culture, Routine No growth after 5 days.    Blood culture [[id number]] Collected: 07/03/20 0711    Order Status: Completed Specimen: Blood Updated: 07/08/20 0812     Blood Culture, Routine No growth after 5 days.    Blood culture [[id number]] Collected: 07/02/20 1110    Order Status: Completed Specimen: Blood Updated: 07/07/20 1412     Blood Culture, Routine No growth after 5 days.    Culture, Anaerobe [[id number]]  (Abnormal) Collected: 07/01/20 2153    Order Status: Completed Specimen: Wound from Abdomen Updated: 07/07/20 1316     Anaerobic Culture FUSOBACTERIUM NUCLEATUM  Moderate        PREVOTELLA (B.) MELANINOGENICA  Moderate      Narrative:      Left chest incision.    Aerobic culture [[id number]]  (Abnormal)  (Susceptibility) Collected: 07/01/20 2153    Order Status: Completed Specimen: Wound from Abdomen Updated: 07/04/20 1214     Aerobic Bacterial Culture ENTEROBACTER CLOACAE  Moderate  No other significant isolate      Narrative:      Left chest incision          BMP:   Recent Labs   Lab 07/10/20  0535   GLU 86      K 4.1      CO2 27   BUN 12   CREATININE 1.8*   CALCIUM 9.5   MG 2.0     Cardiac Markers: No results for input(s): CKMB, TROPONINT, MYOGLOBIN in the last 72 hours.  Coagulation:   Recent Labs   Lab 07/10/20  0535   INR 2.1*   APTT 34.7*     I have reviewed all pertinent labs within the past 24 hours.    Estimated Creatinine Clearance: 64.7 mL/min (A) (based on SCr of 1.8 mg/dL (H)).    Diagnostic Results:  I have reviewed all pertinent imaging results/findings within the past 24 hours.    Assessment and Plan:     53 yo AAM with DCM, HBP, CHF stage D s/p HM 2, ICD lead replacement 03/09/2020, hyperthyroidism,  sent in from clinic with a ICD pocket site infection. Patient wife reports yellow pus oozing from site this past week. Was prescribed antibiotics on Monday 6/29/20, and advised to come get admitted that day, but patient reported he didn't think  it was necessary at that time. Denies fever, but reports intermittent nausea and chills, and Left side of Left hand tingling.      VAD parameters at baseline.  Pt speed decreased to 9800 rpms from 10,000 on 06/12/2020    ICD device check - 07/01/2020 --. Device interrogation revealed HV x 1 on 6/27/20 @ 3:41 pm for MMVT, Type 2 break.,CL-188 bpm. Patient reported sitting in car bending over when it occurred, not doing anything strenuous. Patient denies LOC. Patient reports overall feeling palpitations, tired, no energy, and COLEMAN, even prior to HV therapy.          * ICD (implantable cardioverter-defibrillator) infection  - presented with device pocket infection  - has been on oral doxycycline at home for the last 1 week with no improvement  - denies any fever but does admit to fatigue and chills   - labs pending   - ICD device check - 07/01/2020 --. Device interrogation revealed HV x 1 on 6/27/20 @ 3:41 pm for MMVT, Type 2 break.,CL-188 bpm.    Plan   - ID consulted who recommended IV vancomycin and IV cefepime. On 7/4 DC Vanco, c/w cefepime. Wound c/s positive for Enterobacter cloacae. Blood c/s from 7/2 and 7/3 negative.  ID f/u appreciated- DC Vancomycin, changed to IV cefepime, continue 2 weeks post device extraction. Will need PICC on DC.  EP - plan for ICD removal, CTS consulted for input and back up plan. EP follow up appreciated.  - will f/u blood and site cultures and based on the sensitivities will narrow the antibitoics  - EP informed who will plan an outpatient generator change and pocket site debridement.  - patient will stay over the weekend o get IV antibiotics and get the final results of his culture.       Gout  - Uric acid  - S/W colchicine and allopurinol continued    Elevated LDH  LDH in 640 -> 585 -> 492 -> 474 -> 418 after  250 ml fluids, reduced torsemide. BNP 97. Repeat TTE. LFT normal.  Will c/w coumadin. INR 2.2 today. Coumadin 7.5 mg. Given risk of GI bleed, patient not a candidate for  heparin bridge. Also given concern risk of bleeding, hold off on starting persantine. C/W ASA 81 mg daily. Likely LDH elevation as an acute phase reactant from infection.         VT (ventricular tachycardia)  - device check showed a VT episode with shock on 06/27  - patient denies any LOC   - c/w amiodarone 200 mg daily.   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 Trend 640 -> 585 -> 474 -> 445 -> 416. Received 250 ml fluids on 7/4. . LFT normal.  Will c/w coumadin. INR 1.9. Coumadin 5 mg. Given risk of GI bleed, patient not a candidate for heparin bridge. Also given concern risk of bleeding, hold off on starting persantine. C/W ASA 81 mg daily.  -Pt reluctant to have speed increase as he had symptoms in the past with speed change, no active symptoms. Will hold speed change for now.  Also he prefers not to uptitrate his BP medications as he is anxious and would like not to increase medications at this time. Informed risks of uncontrolled BP, pt aware of risks.   - INR 1.9 Goal of 2-3   - c/w coumadin  - patient denies any dark urine and no obvious scleral icterus on exam  - drive site clean and dry but will send site cultures.    Procedure: Device Interrogation Including analysis of device parameters  Current Settings: Ventricular Assist Device  Review of device function is stable/unstable stable    TXP LVAD INTERROGATIONS 7/10/2020 7/10/2020 7/10/2020 7/10/2020 7/9/2020 7/9/2020 7/9/2020   Type HeartMate II HeartMate II HeartMate II HeartMate II HeartMate II HeartMate II HeartMate II   Flow 5.0 4.7 4.3 4.3 5.0 5.1 4.7   Speed 9800 9800 9800 9800 9800 9800 9800   PI 1.5 3.0 4.1 2.8 3.8 4.3 3.6   Power (Jackson) 6.2 6.0 5.7 6.1 6.3 6.3 6.1   LSL - 9400 - 9400 9400 9400 9400   Pulsatility - No Pulse No Pulse No Pulse No Pulse Intermittent pulse Intermittent pulse       NICM  (nonischemic cardiomyopathy)  - 7/4 bolus fluids. Reduce Torsemide to 20 mg daily.   - keep K > 4 and Mg >2    Seen and d/w Dr. John Hopkins MD  Heart Transplant  [facility]

## 2020-07-10 NOTE — PLAN OF CARE
Notified by EP Fellow that patient was hypotensive. Pt had A line with MAP <60. Lost blood in lab. Received 2 units of PRBC and 2.9 lt of fluid in the procedure area. Pt c/o SOB and palpitations. Dobutamine was started and increased to 7.5 mcg/kg/min ( Started in PACU). Xray not much volume overloaded ( prelim).  VAD no alarms. MAP improved to > 60 after resuscitation.   Svo2-- 62, CVP- 6. Hb repeat of 11.6.  On exam: Smooth VAD hum,. AAO-3, warm extremities  Plan: Transfer to Unit.  A line in place.  Monitor CVP/Svo2/UO/ BMP.  TTE    D/W Dr. Lopez

## 2020-07-10 NOTE — SUBJECTIVE & OBJECTIVE
Past Medical History:   Diagnosis Date    Anticoagulant long-term use     CHF (congestive heart failure)     Dilated cardiomyopathy 1/10/2018    Disorder of kidney and ureter     CKD    Encounter for blood transfusion     Gout     HTN (hypertension)     Thyroid disease     Ventricular tachycardia (paroxysmal)        Past Surgical History:   Procedure Laterality Date    CARDIAC CATHETERIZATION  Dec. 2012    CARDIAC DEFIBRILLATOR PLACEMENT Left     CRRT-D    COLONOSCOPY N/A 3/6/2018    Procedure: COLONOSCOPY;  Surgeon: Alonso Bone MD;  Location: [facility] ENDO (2ND FLR);  Service: Endoscopy;  Laterality: N/A;    COLONOSCOPY N/A 7/17/2019    Procedure: COLONOSCOPY;  Surgeon: Blane Valdez MD;  Location: [facility] ENDO (2ND FLR);  Service: Endoscopy;  Laterality: N/A;    COLONOSCOPY N/A 7/18/2019    Procedure: COLONOSCOPY;  Surgeon: Blane Valdez MD;  Location: [facility] ENDO (2ND FLR);  Service: Endoscopy;  Laterality: N/A;    ESOPHAGOGASTRODUODENOSCOPY N/A 7/17/2019    Procedure: EGD (ESOPHAGOGASTRODUODENOSCOPY);  Surgeon: Blane Valdez MD;  Location: [facility] ENDO (2ND FLR);  Service: Endoscopy;  Laterality: N/A;    ESOPHAGOGASTRODUODENOSCOPY N/A 7/18/2019    Procedure: EGD (ESOPHAGOGASTRODUODENOSCOPY);  Surgeon: Blane Valdez MD;  Location: [facility] ENDO (2ND FLR);  Service: Endoscopy;  Laterality: N/A;    NONINVASIVE CARDIAC ELECTROPHYSIOLOGY STUDY N/A 10/18/2019    Procedure: CARDIAC ELECTROPHYSIOLOGY STUDY, NONINVASIVE;  Surgeon: Raz Wagner MD;  Location: [facility] EP LAB;  Service: Cardiology;  Laterality: N/A;  VT, DFTs, MDT CRTD in situ, LVAD, juarez MB, 3098    REPLACEMENT OF IMPLANTABLE CARDIOVERTER-DEFIBRILLATOR (ICD) GENERATOR N/A 3/9/2020    Procedure: REPLACEMENT, ICD GENERATOR;  Surgeon: Harry Yun MD;  Location: [facility] EP LAB;  Service: Cardiology;  Laterality: N/A;  VT, ICD Gen Change and Lead Revision, MDT, MAC, DM,3 Prep    REVISION OF IMPLANTABLE CARDIOVERTER-DEFIBRILLATOR (ICD) ELECTRODE LEAD PLACEMENT  N/A 3/9/2020    Procedure: REVISION, INSERTION, ELECTRODE LEAD, ICD;  Surgeon: Harry Yun MD;  Location: [facility] EP LAB;  Service: Cardiology;  Laterality: N/A;  VT, ICD Gen Change and Lead Revision, MDT, MAC, DM,3 Prep    TREATMENT OF CARDIAC ARRHYTHMIA  10/18/2019    Procedure: Cardioversion or Defibrillation;  Surgeon: Raz Wagner MD;  Location: [facility] EP LAB;  Service: Cardiology;;       Review of patient's allergies indicates:   Allergen Reactions    Lisinopril Anaphylaxis    Hydralazine analogues      Chronic constipation, impotence, dizziness       No current facility-administered medications on file prior to encounter.      Current Outpatient Medications on File Prior to Encounter   Medication Sig    allopurinol (ZYLOPRIM) 100 MG tablet TAKE 1 TABLET BY MOUTH EVERY DAY (Patient taking differently: Take 100 mg by mouth nightly. )    amiodarone (PACERONE) 400 MG tablet Take 1 tablet (400 mg total) by mouth once daily. (Patient taking differently: Take 200 mg by mouth once daily. )    amLODIPine (NORVASC) 10 MG tablet Take 1 tablet (10 mg total) by mouth once daily.    doxazosin (CARDURA) 8 MG Tab Take 1 tablet (8 mg total) by mouth every 12 (twelve) hours.    [] doxycycline (VIBRAMYCIN) 100 MG Cap Take 1 capsule (100 mg total) by mouth 2 (two) times daily. for 10 days    ferrous gluconate 324 mg (37.5 mg iron) Tab tablet Take 1 tablet (324 mg total) by mouth daily with breakfast. (Patient taking differently: Take 324 mg by mouth daily with lunch. )    guanFACINE (TENEX) 1 MG Tab Take 1 tablet (1 mg total) by mouth every evening.    pantoprazole (PROTONIX) 40 MG tablet TAKE 1 TABLET (40 MG TOTAL) BY MOUTH ONCE DAILY. (Patient taking differently: Take 40 mg by mouth daily with lunch. )    paroxetine (PAXIL) 10 MG tablet Take 1 tablet (10 mg total) by mouth every morning.    potassium chloride SA (K-DUR,KLOR-CON) 20 MEQ tablet Take 20 mEq by mouth 2 (two) times daily.    []  predniSONE (DELTASONE) 2.5 MG tablet Take 3 tablets (7.5 mg total) by mouth once daily for 10 days, THEN 2 tablets (5 mg total) once daily for 10 days, THEN 1 tablet (2.5 mg total) once daily for 15 days. Then discontinue..    spironolactone (ALDACTONE) 25 MG tablet Take 1 tablet (25 mg total) by mouth once daily. (Patient taking differently: Take 25 mg by mouth daily with lunch. )    torsemide (DEMADEX) 20 MG Tab TAKE 2 TABS BY MOUTH EVERY MORNING AND 1 TAB EVERY EVENING FOR 3 DAYS THEN 2 TABS DAILY THEREAFTER (Patient taking differently: Take 40 mg by mouth once daily. TAKE 2 TABS BY MOUTH EVERY MORNING AND 1 TAB EVERY EVENING FOR 3 DAYS THEN 2 TABS DAILY THEREAFTER)    vitamin D (VITAMIN D3) 1000 units Tab Take 1,000 Units by mouth once daily.    zolpidem (AMBIEN) 10 mg Tab Take 1 tablet (10 mg total) by mouth nightly as needed.    aspirin (ECOTRIN) 81 MG EC tablet Take 1 tablet (81 mg total) by mouth once daily.    fluticasone propionate (FLONASE) 50 mcg/actuation nasal spray 2 sprays (100 mcg total) by Each Nostril route once daily.    sildenafiL (VIAGRA) 100 MG tablet Take 1 tablet (100 mg total) by mouth daily as needed for Erectile Dysfunction.    warfarin (COUMADIN) 5 MG tablet Take 5mg orally daily except 2.5mg orally on Monday /  (Patient taking differently: Take 2.5 mg by mouth. Take 5mg orally daily except 2.5mg orally on  and )     Family History     Problem Relation (Age of Onset)    Cancer Sister (54)    Coronary artery disease Father    Diabetes Father    Heart disease Father    Hypertension Father    No Known Problems Mother, Brother        Tobacco Use    Smoking status: Former Smoker     Packs/day: 1.00     Years: 31.00     Pack years: 31.00     Types: Cigarettes     Quit date: 2018     Years since quittin.4    Smokeless tobacco: Never Used    Tobacco comment: pt is quiting on his own - pt stated not qualified for program;  pt  quit on his own    Substance and Sexual Activity    Alcohol use: No     Alcohol/week: 0.0 standard drinks     Frequency: Never     Drinks per session: Patient refused     Binge frequency: Never     Comment: one fifth of gin or rum/week    Drug use: No    Sexual activity: Yes     Partners: Female     Birth control/protection: None     Comment: 10/5/17  with same partner 7 years      Review of Systems   Constitution: Negative. Negative for chills and fever.   HENT: Negative.    Eyes: Negative.    Cardiovascular: Negative for chest pain, claudication and paroxysmal nocturnal dyspnea.        Recent shock, pus from generator site   Respiratory: Negative for cough, shortness of breath and wheezing.    Endocrine: Negative.    Hematologic/Lymphatic: Does not bruise/bleed easily.   Skin: Negative for nail changes and rash.   Musculoskeletal: Negative.  Negative for back pain.   Gastrointestinal: Negative for abdominal pain, melena, nausea and vomiting.   Neurological: Negative for dizziness and headaches.   Psychiatric/Behavioral: Negative for altered mental status, depression and substance abuse.   Allergic/Immunologic: Negative.      Objective:     Vital Signs (Most Recent):  Temp: 97.8 °F (36.6 °C) (07/10/20 0812)  Pulse: 65 (07/10/20 0810)  Resp: 16 (07/10/20 0433)  BP: (!) 88/0 (07/10/20 0433)  SpO2: (!) 93 % (07/10/20 0812) Vital Signs (24h Range):  Temp:  [97.5 °F (36.4 °C)-98.7 °F (37.1 °C)] 97.8 °F (36.6 °C)  Pulse:  [63-85] 65  Resp:  [16-18] 16  SpO2:  [93 %-98 %] 93 %  BP: (88-92)/(0) 88/0     Weight: 121.5 kg (267 lb 13.7 oz)  Body mass index is 35.34 kg/m².    SpO2: (!) 93 %  O2 Device (Oxygen Therapy): room air      Intake/Output Summary (Last 24 hours) at 7/10/2020 0818  Last data filed at 7/10/2020 0455  Gross per 24 hour   Intake 10 ml   Output 1150 ml   Net -1140 ml       Lines/Drains/Airways     Peripherally Inserted Central Catheter Line            PICC Double Lumen 07/06/20 1642 right basilic 3 days           Line                 VAD 03/08/18 1124 Left ventricular assist device HeartMate  days          Peripheral Intravenous Line                 Peripheral IV - Single Lumen 07/09/20 1455 20 G;1 1/4 in Left Antecubital less than 1 day                Physical Exam   Constitutional: He is oriented to person, place, and time. He appears well-developed and well-nourished.   HENT:   Head: Normocephalic and atraumatic.   Eyes: Pupils are equal, round, and reactive to light. Conjunctivae and EOM are normal.   Neck: No JVD present.   Cardiovascular: Exam reveals no gallop and no friction rub.   No murmur heard.  LVAD hum   Pulmonary/Chest: Effort normal. No stridor. He has no wheezes. He has no rales. He exhibits no tenderness.   Abdominal: Soft. Bowel sounds are normal. He exhibits no distension and no mass. There is no abdominal tenderness. There is no guarding.   Musculoskeletal:         General: No tenderness, deformity or edema.   Neurological: He is alert and oriented to person, place, and time.   Skin: Skin is warm and dry. No rash noted. No erythema.   Psychiatric: He has a normal mood and affect.       Significant Labs:   CMP   Recent Labs   Lab 07/09/20  0605 07/10/20  0535   * 139   K 4.3 4.1    105   CO2 24 27   GLU 85 86   BUN 12 12   CREATININE 1.5* 1.8*   CALCIUM 9.0 9.5   PROT  --  7.3   ALBUMIN  --  3.5   BILITOT  --  0.4   ALKPHOS  --  93   AST  --  30   ALT  --  24   ANIONGAP 6* 7*   ESTGFRAFRICA >60.0 48.6*   EGFRNONAA 52.4* 42.0*    and CBC   Recent Labs   Lab 07/09/20  0605 07/10/20  0535   WBC 5.73 5.35   HGB 11.4* 11.9*   HCT 36.8* 39.1*    225       Significant Imaging: Echocardiogram:   Transthoracic echo (TTE) complete (Cupid Only):   Results for orders placed or performed during the hospital encounter of 07/01/20   Echo Color Flow Doppler? Yes   Result Value Ref Range    Ascending aorta 3.34 cm    STJ 2.96 cm    IVS 1.04 0.6 - 1.1 cm    LA size 3.90 cm    Left Atrium Major Axis  6.81 cm    Left Atrium Minor Axis 6.81 cm    LVIDD 9.50 (A) 3.5 - 6.0 cm    LVIDS 9.37 (A) 2.1 - 4.0 cm    LVOT diameter 2.49 cm    PW 1.34 (A) 0.6 - 1.1 cm    MV Peak A Jorge 0.49 m/s    E wave decelartion time 112.55 msec    MV Peak E Jorge 0.78 m/s    RA Major Axis 5.76 cm    RA Width 4.15 cm    Sinus 3.16 cm    TR Max Jorge 2.09 m/s    TDI LATERAL 0.10 m/s    TDI SEPTAL 0.08 m/s    LA WIDTH 4.84 cm    MV stenosis pressure 1/2 time 32.64 ms    LV Diastolic Volume 547.08 mL    LV Systolic Volume 489.59 mL    RV S' 10.73 cm/s    LV LATERAL E/E' RATIO 7.80 m/s    LV SEPTAL E/E' RATIO 9.75 m/s    FS 1 %    LA volume 109.26 cm3    LV mass 682.26 g    Left Ventricle Relative Wall Thickness 0.28 cm    MV valve area p 1/2 method 6.74 cm2    E/A ratio 1.59     Mean e' 0.09 m/s    LVOT area 4.9 cm2    E/E' ratio 8.67 m/s    LV Systolic Volume Index 201.6 mL/m2    LV Diastolic Volume Index 225.22 mL/m2    LA Volume Index 45.0 mL/m2    LV Mass Index 281 g/m2    Triscuspid Valve Regurgitation Peak Gradient 17 mmHg    BSA 2.49 m2    Narrative    · Eccentric left ventricular hypertrophy. Severely decreased left   ventricular systolic function. The estimated ejection fraction is 10%.  · Grade I (mild) left ventricular diastolic dysfunction consistent with   impaired relaxation.  · Moderate left atrial enlargement.  · Small posterior pericardial effusion.  · Mild mitral regurgitation.  · Mild tricuspid regurgitation.  · LVAD present. Base speed is 9800 RPMs. The pump type is a Heartmate II.   The interventricular septum appears midline. LVEDD 9.5cm. Unable to   comment on AV  · RV not well seen. Appears normal in size on limited images.     There is a suggestion of a mobile mass on a lead in the right atrium   (image 47)

## 2020-07-10 NOTE — PROGRESS NOTES
Spoke with Dr Drake (anethesia) about pt complaints of backpain from bedrest.  Dr Drake is ok with giving dilaudid.

## 2020-07-10 NOTE — PLAN OF CARE
Problem: Adult Inpatient Plan of Care  Goal: Patient-Specific Goal (Individualization)  Flowsheets (Taken 7/10/2020 0414)  Individualized Care Needs: closely monitor exposed ICD  Anxieties, Fears or Concerns: concerned about ICD extraction  Patient-Specific Goals (Include Timeframe): Pt will toleration ICD extraction   Pt free of falls/traumas/injuries. Skin remains clean, dry, and intact.  Wound care preformed on exposed ICD site. Pt NPO since midnight for ICD removal. Pt re-educated on importance of measuring accurate intake and out put; pt verbalized and demonstrates understanding. Reviewed plan of care with pt; and pt verbalized understanding.  Pt AAOX4, VSS, and  in no distress will continue to monitor.

## 2020-07-10 NOTE — HPI
Tim Richards is a 54 y.o. male with DCM (EF 10%, grade III DD) s/p HM II with Outflow graft changed (03/9/18) as DT, BiV-Icd [employer] (2014 Dr. Chiu), prior history of VT/VF presenting to device clinic 07/01/2020 c/o incision site draining and getting shocked on Saturday 6/27/20. Wife reports yellow pus oozing from site this past week. Now admitted, initially on broad spectrum abx, transitioned to cefepime s/p negative blood cx. Wound cx positive for enterobacter, fusobacteria and prevotella. No complaints this AM. INR mostly 1.8-2.4 but as low as 1.4 7/2.    Dysphagia or odynophagia:  No  Liver Disease, esophageal disease, or known varices:  No  Upper GI Bleeding: No  Snoring:  No  Sleep Apnea:  No  Prior neck surgery or radiation:  No  History of anesthetic difficulties:  No  Family history of anesthetic difficulties:  No  Last oral intake:  12 hours ago  Able to move neck in all directions:  Yes

## 2020-07-11 PROBLEM — R74.02 ELEVATED LDH: Status: RESOLVED | Noted: 2020-07-01 | Resolved: 2020-07-11

## 2020-07-11 LAB
ALLENS TEST: ABNORMAL
ALLENS TEST: ABNORMAL
ANION GAP SERPL CALC-SCNC: 7 MMOL/L (ref 8–16)
ANION GAP SERPL CALC-SCNC: 7 MMOL/L (ref 8–16)
APTT BLDCRRT: 28.8 SEC (ref 21–32)
BASOPHILS # BLD AUTO: 0.03 K/UL (ref 0–0.2)
BASOPHILS NFR BLD: 0.2 % (ref 0–1.9)
BSA FOR ECHO PROCEDURE: 2.5 M2
BUN SERPL-MCNC: 19 MG/DL (ref 6–20)
BUN SERPL-MCNC: 20 MG/DL (ref 6–20)
CALCIUM SERPL-MCNC: 8.5 MG/DL (ref 8.7–10.5)
CALCIUM SERPL-MCNC: 8.8 MG/DL (ref 8.7–10.5)
CHLORIDE SERPL-SCNC: 104 MMOL/L (ref 95–110)
CHLORIDE SERPL-SCNC: 104 MMOL/L (ref 95–110)
CO2 SERPL-SCNC: 22 MMOL/L (ref 23–29)
CO2 SERPL-SCNC: 24 MMOL/L (ref 23–29)
CREAT SERPL-MCNC: 2.2 MG/DL (ref 0.5–1.4)
CREAT SERPL-MCNC: 2.3 MG/DL (ref 0.5–1.4)
DELSYS: ABNORMAL
DIFFERENTIAL METHOD: ABNORMAL
EOSINOPHIL # BLD AUTO: 0 K/UL (ref 0–0.5)
EOSINOPHIL NFR BLD: 0 % (ref 0–8)
ERYTHROCYTE [DISTWIDTH] IN BLOOD BY AUTOMATED COUNT: 15.5 % (ref 11.5–14.5)
EST. GFR  (AFRICAN AMERICAN): 36.1 ML/MIN/1.73 M^2
EST. GFR  (AFRICAN AMERICAN): 38.1 ML/MIN/1.73 M^2
EST. GFR  (NON AFRICAN AMERICAN): 31.3 ML/MIN/1.73 M^2
EST. GFR  (NON AFRICAN AMERICAN): 33 ML/MIN/1.73 M^2
FIO2: 21
GLUCOSE SERPL-MCNC: 139 MG/DL (ref 70–110)
GLUCOSE SERPL-MCNC: 164 MG/DL (ref 70–110)
HCO3 UR-SCNC: 21.4 MMOL/L (ref 24–28)
HCO3 UR-SCNC: 25.8 MMOL/L (ref 24–28)
HCT VFR BLD AUTO: 34 % (ref 40–54)
HGB BLD-MCNC: 10.5 G/DL (ref 14–18)
IMM GRANULOCYTES # BLD AUTO: 0.15 K/UL (ref 0–0.04)
IMM GRANULOCYTES NFR BLD AUTO: 0.8 % (ref 0–0.5)
INR PPP: 2.5 (ref 0.8–1.2)
LDH SERPL L TO P-CCNC: 370 U/L (ref 110–260)
LYMPHOCYTES # BLD AUTO: 0.9 K/UL (ref 1–4.8)
LYMPHOCYTES NFR BLD: 4.5 % (ref 18–48)
MAGNESIUM SERPL-MCNC: 1.8 MG/DL (ref 1.6–2.6)
MCH RBC QN AUTO: 27.5 PG (ref 27–31)
MCHC RBC AUTO-ENTMCNC: 30.9 G/DL (ref 32–36)
MCV RBC AUTO: 89 FL (ref 82–98)
MODE: ABNORMAL
MONOCYTES # BLD AUTO: 1.7 K/UL (ref 0.3–1)
MONOCYTES NFR BLD: 8.4 % (ref 4–15)
NEUTROPHILS # BLD AUTO: 17.2 K/UL (ref 1.8–7.7)
NEUTROPHILS NFR BLD: 86.1 % (ref 38–73)
NRBC BLD-RTO: 0 /100 WBC
PCO2 BLDA: 35.6 MMHG (ref 35–45)
PCO2 BLDA: 44.8 MMHG (ref 35–45)
PH SMN: 7.37 [PH] (ref 7.35–7.45)
PH SMN: 7.39 [PH] (ref 7.35–7.45)
PHOSPHATE SERPL-MCNC: 3.3 MG/DL (ref 2.7–4.5)
PLATELET # BLD AUTO: 219 K/UL (ref 150–350)
PMV BLD AUTO: 10.7 FL (ref 9.2–12.9)
PO2 BLDA: 28 MMHG (ref 40–60)
PO2 BLDA: 68 MMHG (ref 80–100)
POC BE: -4 MMOL/L
POC BE: 0 MMOL/L
POC SATURATED O2: 50 % (ref 95–100)
POC SATURATED O2: 93 % (ref 95–100)
POC TCO2: 22 MMOL/L (ref 23–27)
POC TCO2: 27 MMOL/L (ref 24–29)
POTASSIUM SERPL-SCNC: 5.1 MMOL/L (ref 3.5–5.1)
POTASSIUM SERPL-SCNC: 5.5 MMOL/L (ref 3.5–5.1)
PROTHROMBIN TIME: 23.5 SEC (ref 9–12.5)
RBC # BLD AUTO: 3.82 M/UL (ref 4.6–6.2)
SAMPLE: ABNORMAL
SAMPLE: ABNORMAL
SITE: ABNORMAL
SITE: ABNORMAL
SODIUM SERPL-SCNC: 133 MMOL/L (ref 136–145)
SODIUM SERPL-SCNC: 135 MMOL/L (ref 136–145)
SP02: 98
WBC # BLD AUTO: 19.94 K/UL (ref 3.9–12.7)

## 2020-07-11 PROCEDURE — 99233 PR SUBSEQUENT HOSPITAL CARE,LEVL III: ICD-10-PCS | Mod: ,,, | Performed by: INTERNAL MEDICINE

## 2020-07-11 PROCEDURE — 25000003 PHARM REV CODE 250

## 2020-07-11 PROCEDURE — 25000003 PHARM REV CODE 250: Performed by: INTERNAL MEDICINE

## 2020-07-11 PROCEDURE — 27000248 HC VAD-ADDITIONAL DAY

## 2020-07-11 PROCEDURE — 99900035 HC TECH TIME PER 15 MIN (STAT)

## 2020-07-11 PROCEDURE — 63600175 PHARM REV CODE 636 W HCPCS

## 2020-07-11 PROCEDURE — 85025 COMPLETE CBC W/AUTO DIFF WBC: CPT

## 2020-07-11 PROCEDURE — 20000000 HC ICU ROOM

## 2020-07-11 PROCEDURE — 85730 THROMBOPLASTIN TIME PARTIAL: CPT

## 2020-07-11 PROCEDURE — 83735 ASSAY OF MAGNESIUM: CPT

## 2020-07-11 PROCEDURE — 93750 PR INTERROGATE VENT ASSIST DEV, IN PERSON, W PHYSICIAN ANALYSIS: ICD-10-PCS | Mod: ,,, | Performed by: INTERNAL MEDICINE

## 2020-07-11 PROCEDURE — 93750 INTERROGATION VAD IN PERSON: CPT | Mod: ,,, | Performed by: INTERNAL MEDICINE

## 2020-07-11 PROCEDURE — 80048 BASIC METABOLIC PNL TOTAL CA: CPT

## 2020-07-11 PROCEDURE — 25000003 PHARM REV CODE 250: Performed by: STUDENT IN AN ORGANIZED HEALTH CARE EDUCATION/TRAINING PROGRAM

## 2020-07-11 PROCEDURE — 99233 SBSQ HOSP IP/OBS HIGH 50: CPT | Mod: ,,, | Performed by: INTERNAL MEDICINE

## 2020-07-11 PROCEDURE — 85610 PROTHROMBIN TIME: CPT

## 2020-07-11 PROCEDURE — 84100 ASSAY OF PHOSPHORUS: CPT

## 2020-07-11 PROCEDURE — 94761 N-INVAS EAR/PLS OXIMETRY MLT: CPT

## 2020-07-11 PROCEDURE — 63600175 PHARM REV CODE 636 W HCPCS: Mod: JG | Performed by: INTERNAL MEDICINE

## 2020-07-11 PROCEDURE — 80048 BASIC METABOLIC PNL TOTAL CA: CPT | Mod: 91

## 2020-07-11 PROCEDURE — 82803 BLOOD GASES ANY COMBINATION: CPT

## 2020-07-11 PROCEDURE — 37799 UNLISTED PX VASCULAR SURGERY: CPT

## 2020-07-11 PROCEDURE — 83615 LACTATE (LD) (LDH) ENZYME: CPT

## 2020-07-11 RX ADMIN — PAROXETINE HYDROCHLORIDE 10 MG: 10 TABLET, FILM COATED ORAL at 08:07

## 2020-07-11 RX ADMIN — WARFARIN SODIUM 2.5 MG: 2.5 TABLET ORAL at 05:07

## 2020-07-11 RX ADMIN — ACETAMINOPHEN 650 MG: 325 TABLET ORAL at 08:07

## 2020-07-11 RX ADMIN — ASPIRIN 81 MG: 81 TABLET, COATED ORAL at 08:07

## 2020-07-11 RX ADMIN — AMLODIPINE BESYLATE 10 MG: 10 TABLET ORAL at 08:07

## 2020-07-11 RX ADMIN — DOXAZOSIN 8 MG: 8 TABLET ORAL at 08:07

## 2020-07-11 RX ADMIN — ACETAMINOPHEN 650 MG: 325 TABLET ORAL at 03:07

## 2020-07-11 RX ADMIN — ACETAMINOPHEN 650 MG: 325 TABLET ORAL at 11:07

## 2020-07-11 RX ADMIN — AMIODARONE HYDROCHLORIDE 200 MG: 200 TABLET ORAL at 08:07

## 2020-07-11 RX ADMIN — ALTEPLASE 2 MG: 2.2 INJECTION, POWDER, LYOPHILIZED, FOR SOLUTION INTRAVENOUS at 12:07

## 2020-07-11 RX ADMIN — ALLOPURINOL 100 MG: 100 TABLET ORAL at 08:07

## 2020-07-11 RX ADMIN — PREDNISONE 2.5 MG: 2.5 TABLET ORAL at 08:07

## 2020-07-11 RX ADMIN — CEFEPIME 1 G: 1 INJECTION, POWDER, FOR SOLUTION INTRAMUSCULAR; INTRAVENOUS at 08:07

## 2020-07-11 RX ADMIN — ZOLPIDEM TARTRATE 10 MG: 5 TABLET ORAL at 11:07

## 2020-07-11 RX ADMIN — ZOLPIDEM TARTRATE 10 MG: 5 TABLET ORAL at 12:07

## 2020-07-11 RX ADMIN — CEFEPIME 1 G: 1 INJECTION, POWDER, FOR SOLUTION INTRAMUSCULAR; INTRAVENOUS at 03:07

## 2020-07-11 RX ADMIN — CEFEPIME 1 G: 1 INJECTION, POWDER, FOR SOLUTION INTRAMUSCULAR; INTRAVENOUS at 11:07

## 2020-07-11 RX ADMIN — FERROUS GLUCONATE TAB 324 MG (37.5 MG ELEMENTAL IRON) 324 MG: 324 (37.5 FE) TAB at 01:07

## 2020-07-11 RX ADMIN — PANTOPRAZOLE SODIUM 40 MG: 40 TABLET, DELAYED RELEASE ORAL at 01:07

## 2020-07-11 RX ADMIN — GUANFACINE 1 MG: 1 TABLET ORAL at 08:07

## 2020-07-11 RX ADMIN — MELATONIN 1000 UNITS: at 08:07

## 2020-07-11 RX ADMIN — ACETAMINOPHEN 650 MG: 325 TABLET ORAL at 12:07

## 2020-07-11 RX ADMIN — COLCHICINE 0.6 MG: 0.6 TABLET, FILM COATED ORAL at 08:07

## 2020-07-11 NOTE — PROGRESS NOTES
[facility]  Heart Transplant  Progress Note    Patient Name: Tim Richards  MRN: [id number]  Admission Date: 7/1/2020  Hospital Length of Stay: 10 days  Attending Physician: Amparo Lopez MD  Primary Care Provider: Power Connors MD  Principal Problem:ICD (implantable cardioverter-defibrillator) infection    Subjective:     Interval History: Hypotensive yesterday after device extraction. Improved after 2 U PRBCs and IVF. Monitored in ICU overnight, Doppler 70-80s. On  2.5 mcg/kg/min.     Continuous Infusions:  Scheduled Meds:   allopurinoL  100 mg Oral Daily    amiodarone  200 mg Oral Daily    amLODIPine  10 mg Oral Daily    aspirin  81 mg Oral Daily    ceFEPime (MAXIPIME) IVPB  1 g Intravenous Q8H    colchicine  0.6 mg Oral Daily    doxazosin  8 mg Oral Q12H    ferrous gluconate  324 mg Oral Daily with lunch    fluticasone propionate  2 spray Each Nostril Daily    guanFACINE  2 mg Oral QHS    pantoprazole  40 mg Oral Daily with lunch    paroxetine  10 mg Oral QAM    predniSONE  2.5 mg Oral Daily    vitamin D  1,000 Units Oral Daily    warfarin  2.5 mg Oral Once    [START ON 7/12/2020] warfarin  5 mg Oral Daily     PRN Meds:acetaminophen, polyethylene glycol, senna-docusate 8.6-50 mg, zolpidem    Review of patient's allergies indicates:   Allergen Reactions    Lisinopril Anaphylaxis    Hydralazine analogues      Chronic constipation, impotence, dizziness     Objective:     Vital Signs (Most Recent):  Temp: 98.1 °F (36.7 °C) (07/11/20 1100)  Pulse: 95 (07/11/20 1100)  Resp: (!) 22 (07/11/20 1100)  BP: (!) 78/0 (07/11/20 1100)  SpO2: (!) 94 % (07/11/20 0500) Vital Signs (24h Range):  Temp:  [97.5 °F (36.4 °C)-98.5 °F (36.9 °C)] 98.1 °F (36.7 °C)  Pulse:  [] 95  Resp:  [12-28] 22  SpO2:  [94 %-98 %] 94 %  BP: (58-85)/(0) 78/0  Arterial Line BP: (58-84)/(46-77) 82/77     Patient Vitals for the past 72 hrs (Last 3 readings):   Weight   07/11/20 0525 122.1 kg  (269 lb 2.9 oz)   07/10/20 1900 121.1 kg (267 lb)   07/10/20 0854 121.1 kg (267 lb)     Body mass index is 35.51 kg/m².      Intake/Output Summary (Last 24 hours) at 7/11/2020 1156  Last data filed at 7/11/2020 1100  Gross per 24 hour   Intake 4421.93 ml   Output 2620 ml   Net 1801.93 ml       Physical Exam  Vitals signs and nursing note reviewed.   Constitutional:       Appearance: Normal appearance.   HENT:      Head: Normocephalic and atraumatic.      Nose: No congestion.      Mouth/Throat:      Mouth: Mucous membranes are moist.   Eyes:      Extraocular Movements: Extraocular movements intact.      Pupils: Pupils are equal, round, and reactive to light.   Neck:      Musculoskeletal: Normal range of motion.   Cardiovascular:      Rate and Rhythm: Normal rate and regular rhythm.      Comments: Smooth VAD hum    Left upper chest pressure dressing.   Pulmonary:      Effort: Pulmonary effort is normal. No respiratory distress.      Breath sounds: Normal breath sounds. No rales.   Abdominal:      General: Abdomen is flat. Bowel sounds are normal. There is no distension.      Tenderness: There is no abdominal tenderness. There is no guarding.   Musculoskeletal:         General: No swelling.   Skin:     General: Skin is warm.   Neurological:      General: No focal deficit present.      Mental Status: He is alert.   Psychiatric:         Mood and Affect: Mood normal.         Significant Labs:  CBC:  Recent Labs   Lab 07/10/20  0535 07/10/20  1645 07/10/20  1706 07/11/20  0452   WBC 5.35 13.72*  --  19.94*   RBC 4.41* 4.20*  --  3.82*   HGB 11.9* 11.6*  --  10.5*   HCT 39.1* 36.4* 38 34.0*    233  --  219   MCV 89 87  --  89   MCH 27.0 27.6  --  27.5   MCHC 30.4* 31.9*  --  30.9*     BNP:  Recent Labs   Lab 07/06/20  0702 07/08/20  0511 07/10/20  0535   * 74 141*     CMP:  Recent Labs   Lab 07/06/20  0701  07/08/20  0511  07/10/20  0535 07/11/20  0452 07/11/20  0832   GLU 89   < > 84   < > 86 164* 139*    CALCIUM 9.3   < > 9.3   < > 9.5 8.8 8.5*   ALBUMIN 3.4*  --  3.5  --  3.5  --   --    PROT 7.1  --  7.4  --  7.3  --   --    *   < > 136   < > 139 133* 135*   K 4.1   < > 3.7   < > 4.1 5.5* 5.1   CO2 23   < > 24   < > 27 22* 24      < > 102   < > 105 104 104   BUN 15   < > 14   < > 12 19 20   CREATININE 1.6*   < > 1.7*   < > 1.8* 2.2* 2.3*   ALKPHOS 98  --  94  --  93  --   --    ALT 21  --  23  --  24  --   --    AST 24  --  30  --  30  --   --    BILITOT 0.4  --  0.5  --  0.4  --   --     < > = values in this interval not displayed.      Coagulation:   Recent Labs   Lab 07/09/20  0605 07/10/20  0535 07/11/20 0452   INR 1.9* 2.1* 2.5*   APTT 34.9* 34.7* 28.8     LDH:  Recent Labs   Lab 07/09/20  0605 07/10/20  0535 07/11/20 0452   * 421* 370*     Microbiology:  Microbiology Results (last 7 days)     Procedure Component Value Units Date/Time    Aerobic culture [[id number]] Collected: 07/10/20 1433    Order Status: Completed Specimen: Incision site from Heart Updated: 07/11/20 0734     Aerobic Bacterial Culture No growth    Narrative:      RA lead    Aerobic culture [[id number]] Collected: 07/10/20 1344    Order Status: Completed Specimen: Incision site from Heart Updated: 07/11/20 0734     Aerobic Bacterial Culture No growth    Narrative:      LV lead    Aerobic culture [[id number]] Collected: 07/10/20 1409    Order Status: Completed Specimen: Incision site from Heart Updated: 07/11/20 0734     Aerobic Bacterial Culture No growth    Narrative:      Old RV lead    Aerobic culture [[id number]] Collected: 07/10/20 1237    Order Status: Completed Specimen: Incision site from Heart Updated: 07/11/20 0734     Aerobic Bacterial Culture No growth    Narrative:      New RV lead    Culture, Anaerobe [[id number]] Collected: 07/10/20 1433    Order Status: Sent Specimen: Incision site from Heart Updated: 07/10/20 1441    Culture, Anaerobe [[id number]] Collected: 07/10/20 1409    Order Status: Sent Specimen:  Incision site from Heart Updated: 07/10/20 1420    Culture, Anaerobe [[id number]] Collected: 07/10/20 1344    Order Status: Sent Specimen: Incision site from Heart Updated: 07/10/20 1404    Culture, Anaerobe [[id number]] Collected: 07/10/20 1237    Order Status: Sent Specimen: Incision site from Heart Updated: 07/10/20 1254    Culture, Anaerobe [[id number]] Collected: 07/01/20 1741    Order Status: Completed Specimen: Incision site from Chest, Left Updated: 07/09/20 0906     Anaerobic Culture No anaerobes isolated    Culture, Anaerobe [[id number]] Collected: 07/01/20 1741    Order Status: Completed Specimen: Skin from Abdomen Updated: 07/09/20 0742     Anaerobic Culture No anaerobes isolated    Narrative:      Drive line site    Blood culture [[id number]] Collected: 07/03/20 0712    Order Status: Completed Specimen: Blood Updated: 07/08/20 0812     Blood Culture, Routine No growth after 5 days.    Blood culture [[id number]] Collected: 07/03/20 0711    Order Status: Completed Specimen: Blood Updated: 07/08/20 0812     Blood Culture, Routine No growth after 5 days.    Blood culture [[id number]] Collected: 07/02/20 1110    Order Status: Completed Specimen: Blood Updated: 07/07/20 1412     Blood Culture, Routine No growth after 5 days.    Culture, Anaerobe [[id number]]  (Abnormal) Collected: 07/01/20 2153    Order Status: Completed Specimen: Wound from Abdomen Updated: 07/07/20 1316     Anaerobic Culture FUSOBACTERIUM NUCLEATUM  Moderate        PREVOTELLA (B.) MELANINOGENICA  Moderate      Narrative:      Left chest incision.    Aerobic culture [[id number]]  (Abnormal)  (Susceptibility) Collected: 07/01/20 2153    Order Status: Completed Specimen: Wound from Abdomen Updated: 07/04/20 1214     Aerobic Bacterial Culture ENTEROBACTER CLOACAE  Moderate  No other significant isolate      Narrative:      Left chest incision          I have reviewed all pertinent labs within the past 24 hours.    Estimated Creatinine Clearance: 50.9  mL/min (A) (based on SCr of 2.3 mg/dL (H)).    Diagnostic Results:  I have reviewed all pertinent imaging results/findings within the past 24 hours.    Assessment and Plan:     55 yo AAM with DCM, HBP, CHF stage D s/p HM 2, ICD lead replacement 03/09/2020, hyperthyroidism,  sent in from clinic with a ICD pocket site infection. Patient wife reports yellow pus oozing from site this past week. Was prescribed antibiotics on Monday 6/29/20, and advised to come get admitted that day, but patient reported he didn't think it was necessary at that time. Denies fever, but reports intermittent nausea and chills, and Left side of Left hand tingling.      VAD parameters at baseline.  Pt speed decreased to 9800 rpms from 10,000 on 06/12/2020    ICD device check - 07/01/2020 --. Device interrogation revealed HV x 1 on 6/27/20 @ 3:41 pm for MMVT, Type 2 break.,CL-188 bpm. Patient reported sitting in car bending over when it occurred, not doing anything strenuous. Patient denies LOC. Patient reports overall feeling palpitations, tired, no energy, and COLEMAN, even prior to HV therapy.          * ICD (implantable cardioverter-defibrillator) infection  - presented with device pocket infection  - has been on oral doxycycline at home for the last 1 week with no improvement  - denies any fever but does admit to fatigue and chills   - labs pending   - ICD device check - 07/01/2020 --. Device interrogation revealed HV x 1 on 6/27/20 @ 3:41 pm for MMVT, Type 2 break.,CL-188 bpm.  - Continue cefepime 2 wks after device extraction (7/24)  - Pressure dressing in place until Monday per EP     Gout  - Uric acid  - S/W colchicine and allopurinol continued    VT (ventricular tachycardia)  - device check showed a VT episode with shock on 06/27  - patient denies any LOC   - c/w amiodarone 200 mg daily.   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 hence the dose was lowered    LVAD (left ventricular assist device) present  -  antiplatelets -aspirin 81  - anticoagulation - continue warfarin    - Stop  today    Procedure: Device Interrogation Including analysis of device parameters  Current Settings: Ventricular Assist Device  Review of device function is stable/unstable stable    TXP LVAD INTERROGATIONS 7/11/2020 7/11/2020 7/11/2020 7/11/2020 7/11/2020 7/11/2020 7/11/2020   Type HeartMate II HeartMate II HeartMate II HeartMate II HeartMate II HeartMate II HeartMate II   Flow 5.2 5.2 5.1 4.5 5.3 5.0 4.3   Speed 9800 9800 9800 9800 9800 9800 9800   PI 2.4 2.8 3.2 2.3 3.4 4.0 3.3   Power (Jackson) 6.3 6.3 6.2 6.2 6.2 6.2 5.9   LSL 9400 9400 9400 9400 9400 9400 9400   Pulsatility Intermittent pulse Intermittent pulse Intermittent pulse Intermittent pulse Intermittent pulse Intermittent pulse Intermittent pulse       Transfer to floor. Remove a-line.     Sanya Zavala MD  Heart Transplant  [facility]

## 2020-07-11 NOTE — PLAN OF CARE
CMICU DAILY GOALS   Pt remains in CMICU     A: Awake    RASS: Goal - RASS Goal: 0-->alert and calm  Actual - RASS (Newman Agitation-Sedation Scale): 0-->alert and calm   Restraint necessity:    B: Breath   SBT: Not intubated   C: Coordinate A & B, analgesics/sedatives   Pain: managed    SAT: Not intubated  D: Delirium   CAM-ICU: Overall CAM-ICU: Negative  E: Early Mobility   MOVE Screen: Fail   Activity: Activity Management: bedrest maintained per order  FAS: Feeding/Nutrition   Diet order: Diet/Nutrition Received: low saturated fat/low cholesterol,   Fluid restriction: Fluid Requirement: 1.5L FR  T: Thrombus   DVT prophylaxis: VTE Required Core Measure: Pharmacological prophylaxis initiated/maintained  H: HOB Elevation   Head of Bed (HOB): HOB at 30 degrees  U: Ulcer Prophylaxis   GI: yes  G: Glucose control   managed    S: Skin   Bundle compliance: yes   Bathing/Skin Care: linen changed Date: 07/11 day bath    B: Bowel Function   no issues   I: Indwelling Catheters   [place] necessity:      Urethral Catheter 07/10/20 0959-Reason for Continuing Urinary Catheterization: Post operative  [REMOVED]      Urethral Catheter 07/10/20 1000 Double-lumen 16 Fr.-Reason for Continuing Urinary Catheterization: Required immobilization   CVC necessity: Yes   IPAD offered: Yes  D: De-escalation Antibx   Yes  Plan for the day   Monitor labs and signs of bleeding.   Family/Goals of care/Code Status   Code Status: Full Codef     No acute events throughout day, VS and assessment per flow sheet, patient progressing towards goals as tolerated, plan of care reviewed with Tim Richards and family, all concerns addressed, will continue to monitor.

## 2020-07-11 NOTE — ASSESSMENT & PLAN NOTE
BiV-Icd Medtronic (2014 Dr. Chiu), successful DFT at 35J on 10/18/2019. Multiple VT episodes,  generator change (BiV ICD -> dual ICD),  device revision (addition of a new RV/ICD lead) and another VF induction via ICD on 3/2020. Presented from device clinic with device erosion.   - S/p device extraction on 7/10  - Continue pressure dressing over the weekend  - Will need to be evaluated outpatient for SUQ-ICD versus reimplant single chamber ICD following completion of antibiotics

## 2020-07-11 NOTE — SUBJECTIVE & OBJECTIVE
Interval History: Hypotensive yesterday after device extraction. Improved after 2 U PRBCs and IVF. Monitored in ICU overnight, Doppler 70-80s. On  2.5 mcg/kg/min.     Continuous Infusions:  Scheduled Meds:   allopurinoL  100 mg Oral Daily    amiodarone  200 mg Oral Daily    amLODIPine  10 mg Oral Daily    aspirin  81 mg Oral Daily    ceFEPime (MAXIPIME) IVPB  1 g Intravenous Q8H    colchicine  0.6 mg Oral Daily    doxazosin  8 mg Oral Q12H    ferrous gluconate  324 mg Oral Daily with lunch    fluticasone propionate  2 spray Each Nostril Daily    guanFACINE  2 mg Oral QHS    pantoprazole  40 mg Oral Daily with lunch    paroxetine  10 mg Oral QAM    predniSONE  2.5 mg Oral Daily    vitamin D  1,000 Units Oral Daily    warfarin  2.5 mg Oral Once    [START ON 7/12/2020] warfarin  5 mg Oral Daily     PRN Meds:acetaminophen, polyethylene glycol, senna-docusate 8.6-50 mg, zolpidem    Review of patient's allergies indicates:   Allergen Reactions    Lisinopril Anaphylaxis    Hydralazine analogues      Chronic constipation, impotence, dizziness     Objective:     Vital Signs (Most Recent):  Temp: 98.1 °F (36.7 °C) (07/11/20 1100)  Pulse: 95 (07/11/20 1100)  Resp: (!) 22 (07/11/20 1100)  BP: (!) 78/0 (07/11/20 1100)  SpO2: (!) 94 % (07/11/20 0500) Vital Signs (24h Range):  Temp:  [97.5 °F (36.4 °C)-98.5 °F (36.9 °C)] 98.1 °F (36.7 °C)  Pulse:  [] 95  Resp:  [12-28] 22  SpO2:  [94 %-98 %] 94 %  BP: (58-85)/(0) 78/0  Arterial Line BP: (58-84)/(46-77) 82/77     Patient Vitals for the past 72 hrs (Last 3 readings):   Weight   07/11/20 0525 122.1 kg (269 lb 2.9 oz)   07/10/20 1900 121.1 kg (267 lb)   07/10/20 0854 121.1 kg (267 lb)     Body mass index is 35.51 kg/m².      Intake/Output Summary (Last 24 hours) at 7/11/2020 1156  Last data filed at 7/11/2020 1100  Gross per 24 hour   Intake 4421.93 ml   Output 2620 ml   Net 1801.93 ml       Physical Exam  Vitals signs and nursing note reviewed.    Constitutional:       Appearance: Normal appearance.   HENT:      Head: Normocephalic and atraumatic.      Nose: No congestion.      Mouth/Throat:      Mouth: Mucous membranes are moist.   Eyes:      Extraocular Movements: Extraocular movements intact.      Pupils: Pupils are equal, round, and reactive to light.   Neck:      Musculoskeletal: Normal range of motion.   Cardiovascular:      Rate and Rhythm: Normal rate and regular rhythm.      Comments: Smooth VAD hum    Left upper chest pressure dressing.   Pulmonary:      Effort: Pulmonary effort is normal. No respiratory distress.      Breath sounds: Normal breath sounds. No rales.   Abdominal:      General: Abdomen is flat. Bowel sounds are normal. There is no distension.      Tenderness: There is no abdominal tenderness. There is no guarding.   Musculoskeletal:         General: No swelling.   Skin:     General: Skin is warm.   Neurological:      General: No focal deficit present.      Mental Status: He is alert.   Psychiatric:         Mood and Affect: Mood normal.         Significant Labs:  CBC:  Recent Labs   Lab 07/10/20  0535 07/10/20  1645 07/10/20  1706 07/11/20  0452   WBC 5.35 13.72*  --  19.94*   RBC 4.41* 4.20*  --  3.82*   HGB 11.9* 11.6*  --  10.5*   HCT 39.1* 36.4* 38 34.0*    233  --  219   MCV 89 87  --  89   MCH 27.0 27.6  --  27.5   MCHC 30.4* 31.9*  --  30.9*     BNP:  Recent Labs   Lab 07/06/20  0702 07/08/20  0511 07/10/20  0535   * 74 141*     CMP:  Recent Labs   Lab 07/06/20  0701  07/08/20  0511  07/10/20  0535 07/11/20  0452 07/11/20  0832   GLU 89   < > 84   < > 86 164* 139*   CALCIUM 9.3   < > 9.3   < > 9.5 8.8 8.5*   ALBUMIN 3.4*  --  3.5  --  3.5  --   --    PROT 7.1  --  7.4  --  7.3  --   --    *   < > 136   < > 139 133* 135*   K 4.1   < > 3.7   < > 4.1 5.5* 5.1   CO2 23   < > 24   < > 27 22* 24      < > 102   < > 105 104 104   BUN 15   < > 14   < > 12 19 20   CREATININE 1.6*   < > 1.7*   < > 1.8* 2.2* 2.3*    ALKPHOS 98  --  94  --  93  --   --    ALT 21  --  23  --  24  --   --    AST 24  --  30  --  30  --   --    BILITOT 0.4  --  0.5  --  0.4  --   --     < > = values in this interval not displayed.      Coagulation:   Recent Labs   Lab 07/09/20  0605 07/10/20  0535 07/11/20  0452   INR 1.9* 2.1* 2.5*   APTT 34.9* 34.7* 28.8     LDH:  Recent Labs   Lab 07/09/20  0605 07/10/20  0535 07/11/20  0452   * 421* 370*     Microbiology:  Microbiology Results (last 7 days)     Procedure Component Value Units Date/Time    Aerobic culture [[id number]] Collected: 07/10/20 1433    Order Status: Completed Specimen: Incision site from Heart Updated: 07/11/20 0734     Aerobic Bacterial Culture No growth    Narrative:      RA lead    Aerobic culture [[id number]] Collected: 07/10/20 1344    Order Status: Completed Specimen: Incision site from Heart Updated: 07/11/20 0734     Aerobic Bacterial Culture No growth    Narrative:      LV lead    Aerobic culture [[id number]] Collected: 07/10/20 1409    Order Status: Completed Specimen: Incision site from Heart Updated: 07/11/20 0734     Aerobic Bacterial Culture No growth    Narrative:      Old RV lead    Aerobic culture [[id number]] Collected: 07/10/20 1237    Order Status: Completed Specimen: Incision site from Heart Updated: 07/11/20 0734     Aerobic Bacterial Culture No growth    Narrative:      New RV lead    Culture, Anaerobe [[id number]] Collected: 07/10/20 1433    Order Status: Sent Specimen: Incision site from Heart Updated: 07/10/20 1441    Culture, Anaerobe [[id number]] Collected: 07/10/20 1409    Order Status: Sent Specimen: Incision site from Heart Updated: 07/10/20 1420    Culture, Anaerobe [[id number]] Collected: 07/10/20 1344    Order Status: Sent Specimen: Incision site from Heart Updated: 07/10/20 1404    Culture, Anaerobe [[id number]] Collected: 07/10/20 1237    Order Status: Sent Specimen: Incision site from Heart Updated: 07/10/20 1254    Culture, Anaerobe [[id number]]  Collected: 07/01/20 1741    Order Status: Completed Specimen: Incision site from Chest, Left Updated: 07/09/20 0906     Anaerobic Culture No anaerobes isolated    Culture, Anaerobe [[id number]] Collected: 07/01/20 1741    Order Status: Completed Specimen: Skin from Abdomen Updated: 07/09/20 0742     Anaerobic Culture No anaerobes isolated    Narrative:      Drive line site    Blood culture [[id number]] Collected: 07/03/20 0712    Order Status: Completed Specimen: Blood Updated: 07/08/20 0812     Blood Culture, Routine No growth after 5 days.    Blood culture [[id number]] Collected: 07/03/20 0711    Order Status: Completed Specimen: Blood Updated: 07/08/20 0812     Blood Culture, Routine No growth after 5 days.    Blood culture [[id number]] Collected: 07/02/20 1110    Order Status: Completed Specimen: Blood Updated: 07/07/20 1412     Blood Culture, Routine No growth after 5 days.    Culture, Anaerobe [[id number]]  (Abnormal) Collected: 07/01/20 2153    Order Status: Completed Specimen: Wound from Abdomen Updated: 07/07/20 1316     Anaerobic Culture FUSOBACTERIUM NUCLEATUM  Moderate        PREVOTELLA (B.) MELANINOGENICA  Moderate      Narrative:      Left chest incision.    Aerobic culture [[id number]]  (Abnormal)  (Susceptibility) Collected: 07/01/20 2153    Order Status: Completed Specimen: Wound from Abdomen Updated: 07/04/20 1214     Aerobic Bacterial Culture ENTEROBACTER CLOACAE  Moderate  No other significant isolate      Narrative:      Left chest incision          I have reviewed all pertinent labs within the past 24 hours.    Estimated Creatinine Clearance: 50.9 mL/min (A) (based on SCr of 2.3 mg/dL (H)).    Diagnostic Results:  I have reviewed all pertinent imaging results/findings within the past 24 hours.

## 2020-07-11 NOTE — PROGRESS NOTES
[facility]  Cardiac Electrophysiology  Progress Note    Admission Date: 7/1/2020  Code Status: Full Code   Attending Physician: Amparo Lopez MD   Expected Discharge Date: 7/12/2020  Principal Problem:ICD (implantable cardioverter-defibrillator) infection    Subjective:     Interval History: no events overnight. No bleeding from pocket site.     ROS otherwise negative  Objective:     Vital Signs (Most Recent):  Temp: 98.5 °F (36.9 °C) (07/11/20 0800)  Pulse: 101 (07/11/20 1000)  Resp: 17 (07/11/20 1000)  BP: (!) 78/0 (07/11/20 0800)  SpO2: (!) 94 % (07/11/20 0500) Vital Signs (24h Range):  Temp:  [97.5 °F (36.4 °C)-98.5 °F (36.9 °C)] 98.5 °F (36.9 °C)  Pulse:  [] 101  Resp:  [12-28] 17  SpO2:  [94 %-98 %] 94 %  BP: (58-85)/(0) 78/0  Arterial Line BP: (58-84)/(46-77) 78/73     Weight: 122.1 kg (269 lb 2.9 oz)  Body mass index is 35.51 kg/m².     SpO2: (!) 94 %  O2 Device (Oxygen Therapy): room air    Physical Exam   Constitutional: He appears well-developed and well-nourished. No distress.   HENT:   Head: Normocephalic and atraumatic.   Eyes: EOM are normal.   Neck: Normal range of motion. Neck supple.   Cardiovascular: Normal rate, regular rhythm, S1 normal, S2 normal, normal heart sounds, intact distal pulses and normal pulses.  No extrasystoles are present. Exam reveals no gallop, no S3, no S4, no distant heart sounds, no friction rub and no opening snap.   No murmur heard.  VAD hum   Pulmonary/Chest: Effort normal. No respiratory distress. He has no wheezes. He has no rales.   Musculoskeletal: Normal range of motion.         General: No deformity or edema.      Comments: Pressure dressing over extraction site   Neurological: He is alert.   Skin: Skin is warm and dry. No rash noted. He is not diaphoretic. No erythema.   Nursing note and vitals reviewed.      Significant Labs: All pertinent lab results from the last 24 hours have been reviewed.    Significant Imaging:  Reviewed    Assessment and Plan:     * ICD (implantable cardioverter-defibrillator) infection  BiV-Icd Medtronic (2014 Dr. Chiu), successful DFT at 35J on 10/18/2019. Multiple VT episodes,  generator change (BiV ICD -> dual ICD),  device revision (addition of a new RV/ICD lead) and another VF induction via ICD on 3/2020. Presented from device clinic with device erosion.   - S/p device extraction on 7/10  - Continue pressure dressing over the weekend  - Will need to be evaluated outpatient for SUQ-ICD versus reimplant single chamber ICD following completion of antibiotics        Mike Lehman MD  Cardiac Electrophysiology  [facility]

## 2020-07-11 NOTE — PLAN OF CARE
CMICU DAILY GOALS   Pt remains in CMICU AAOx 4 on room air. LVAD Hm2 in place, no alarms through shift. Doppler pressures 70s-80s, A- line d/haseeb,  turned off, CVPs 1-5. Pt fitted for life vest. Day bath, LVAD dressing change completed. Pt w stepdown orders.     A: Awake    RASS: Goal - RASS Goal: 0-->alert and calm  Actual - RASS (Newman Agitation-Sedation Scale): 0-->alert and calm   Restraint necessity:    B: Breath   SBT: Not intubated   C: Coordinate A & B, analgesics/sedatives   Pain: managed    SAT: Not intubated  D: Delirium   CAM-ICU: Overall CAM-ICU: Negative  E: Early Mobility   MOVE Screen: Fail   Activity: Activity Management: bedrest maintained per order  FAS: Feeding/Nutrition   Diet order: Diet/Nutrition Received: low saturated fat/low cholesterol,   Fluid restriction: Fluid Requirement: 1.5L FR  T: Thrombus   DVT prophylaxis: VTE Required Core Measure: Pharmacological prophylaxis initiated/maintained  H: HOB Elevation   Head of Bed (HOB): HOB at 30 degrees  U: Ulcer Prophylaxis   GI: yes  G: Glucose control   managed    S: Skin   Bundle compliance: yes   Bathing/Skin Care: linen changed Date: day bath 7/11/20.   B: Bowel Function   no issues   I: Indwelling Catheters   Lagunas necessity:      Urethral Catheter 07/10/20 0959-Reason for Continuing Urinary Catheterization: Post operative  [REMOVED]      Urethral Catheter 07/10/20 1000 Double-lumen 16 Fr.-Reason for Continuing Urinary Catheterization: Required immobilization   CVC necessity: No   IPAD offered: Yes  D: De-escalation Antibx   Yes  Plan for the day   Stepdown   Family/Goals of care/Code Status   Code Status: Full Code     No acute events throughout day, VS and assessment per flow sheet, patient progressing towards goals as tolerated, plan of care reviewed with Tim Richards and family, all concerns addressed, will continue to monitor.

## 2020-07-11 NOTE — ASSESSMENT & PLAN NOTE
- antiplatelets -aspirin 81  - anticoagulation - continue warfarin    Procedure: Device Interrogation Including analysis of device parameters  Current Settings: Ventricular Assist Device  Review of device function is stable/unstable stable    TXP LVAD INTERROGATIONS 7/11/2020 7/11/2020 7/11/2020 7/11/2020 7/11/2020 7/11/2020 7/11/2020   Type HeartMate II HeartMate II HeartMate II HeartMate II HeartMate II HeartMate II HeartMate II   Flow 5.2 5.2 5.1 4.5 5.3 5.0 4.3   Speed 9800 9800 9800 9800 9800 9800 9800   PI 2.4 2.8 3.2 2.3 3.4 4.0 3.3   Power (Jackson) 6.3 6.3 6.2 6.2 6.2 6.2 5.9   LSL 9400 9400 9400 9400 9400 9400 9400   Pulsatility Intermittent pulse Intermittent pulse Intermittent pulse Intermittent pulse Intermittent pulse Intermittent pulse Intermittent pulse

## 2020-07-11 NOTE — ASSESSMENT & PLAN NOTE
- presented with device pocket infection  - has been on oral doxycycline at home for the last 1 week with no improvement  - denies any fever but does admit to fatigue and chills   - labs pending   - ICD device check - 07/01/2020 --. Device interrogation revealed HV x 1 on 6/27/20 @ 3:41 pm for MMVT, Type 2 break.,CL-188 bpm.  - Continue cefepime 2 wks after device extraction (7/24)  - Pressure dressing in place until Monday per EP

## 2020-07-11 NOTE — SUBJECTIVE & OBJECTIVE
Interval History: no events overnight. No bleeding from pocket site.     ROS otherwise negative  Objective:     Vital Signs (Most Recent):  Temp: 98.5 °F (36.9 °C) (07/11/20 0800)  Pulse: 101 (07/11/20 1000)  Resp: 17 (07/11/20 1000)  BP: (!) 78/0 (07/11/20 0800)  SpO2: (!) 94 % (07/11/20 0500) Vital Signs (24h Range):  Temp:  [97.5 °F (36.4 °C)-98.5 °F (36.9 °C)] 98.5 °F (36.9 °C)  Pulse:  [] 101  Resp:  [12-28] 17  SpO2:  [94 %-98 %] 94 %  BP: (58-85)/(0) 78/0  Arterial Line BP: (58-84)/(46-77) 78/73     Weight: 122.1 kg (269 lb 2.9 oz)  Body mass index is 35.51 kg/m².     SpO2: (!) 94 %  O2 Device (Oxygen Therapy): room air    Physical Exam   Constitutional: He appears well-developed and well-nourished. No distress.   HENT:   Head: Normocephalic and atraumatic.   Eyes: EOM are normal.   Neck: Normal range of motion. Neck supple.   Cardiovascular: Normal rate, regular rhythm, S1 normal, S2 normal, normal heart sounds, intact distal pulses and normal pulses.  No extrasystoles are present. Exam reveals no gallop, no S3, no S4, no distant heart sounds, no friction rub and no opening snap.   No murmur heard.  VAD hum   Pulmonary/Chest: Effort normal. No respiratory distress. He has no wheezes. He has no rales.   Musculoskeletal: Normal range of motion.         General: No deformity or edema.      Comments: Pressure dressing over extraction site   Neurological: He is alert.   Skin: Skin is warm and dry. No rash noted. He is not diaphoretic. No erythema.   Nursing note and vitals reviewed.      Significant Labs: All pertinent lab results from the last 24 hours have been reviewed.    Significant Imaging: Reviewed

## 2020-07-11 NOTE — PLAN OF CARE
CMICU DAILY GOALS       A: Awake    RASS: Goal -  0  Actual -  0   Restraint necessity:  No  B: Breath   SBT: Not intubated   C: Coordinate A & B, analgesics/sedatives   Pain: managed    SAT: Not intubated  D: Delirium   CAM-ICU:    E: Early Mobility   MOVE Screen: Pass   Activity: Activity Management: activity adjusted per tolerance  FAS: Feeding/Nutrition   Diet order: Diet/Nutrition Received: NPO, sips of water,   Fluid restriction: Fluid Requirement: 1.5L FR  T: Thrombus   DVT prophylaxis: VTE Required Core Measure: Pharmacological prophylaxis initiated/maintained  H: HOB Elevation   Head of Bed (HOB): HOB at 30 degrees  U: Ulcer Prophylaxis   GI: yes  G: Glucose control   managed    S: Skin   Bundle compliance: yes   Bathing/Skin Care: linen changed Date: Linen changed overnight 7/10  B: Bowel Function   no issues   I: Indwelling Catheters   Lagunas necessity:      Urethral Catheter 07/10/20 0959-Reason for Continuing Urinary Catheterization: Post operative  [REMOVED]      Urethral Catheter 07/10/20 1000 Double-lumen 16 Fr.-Reason for Continuing Urinary Catheterization: Required immobilization   CVC necessity: Yes   IPAD offered: No  D: De-escalation Antibx   No  Plan for the day   Monitor cardiac status  Family/Goals of care/Code Status   Code Status: Full Code  Pt received from PACU overnight with MIGDALIA or VAD alarms.      No acute events throughout day, VS and assessment per flow sheet, patient progressing towards goals as tolerated, plan of care reviewed with Tim Richards and family, all concerns addressed, will continue to monitor.

## 2020-07-11 NOTE — NURSING TRANSFER
Nursing Transfer Note      7/10/2020     [facility]    Transfer via bed    Transfer with cardiac monitoring, 02    Transported by RN    Medicines sent: gtt    Chart send with patient: Yes    Notified: spouse    Patient reassessed at: 7/10/2020 21:00

## 2020-07-11 NOTE — CARE UPDATE
HTS Cross Cover    Patient seen in PACU. He was hypotensive earlier but now improved after transfusion of PRBC's and IVF. He is awake and alert. Only complaint is back pain. MAP 65-70 (a-line). Now on dobutamine 5 mcg/kg/min that was started during procedure. Will decrease to 2.5 mcg/kg/min and keep him there until the morning. Pending transfer to ICU for closer monitoring.     Sanya Zavala MD  Cardiology Fellow

## 2020-07-12 LAB
ALLENS TEST: ABNORMAL
ANION GAP SERPL CALC-SCNC: 8 MMOL/L (ref 8–16)
APTT BLDCRRT: 34.1 SEC (ref 21–32)
BASOPHILS # BLD AUTO: 0.03 K/UL (ref 0–0.2)
BASOPHILS NFR BLD: 0.3 % (ref 0–1.9)
BUN SERPL-MCNC: 18 MG/DL (ref 6–20)
CALCIUM SERPL-MCNC: 8.3 MG/DL (ref 8.7–10.5)
CHLORIDE SERPL-SCNC: 104 MMOL/L (ref 95–110)
CO2 SERPL-SCNC: 22 MMOL/L (ref 23–29)
CREAT SERPL-MCNC: 1.9 MG/DL (ref 0.5–1.4)
DELSYS: ABNORMAL
DIFFERENTIAL METHOD: ABNORMAL
EOSINOPHIL # BLD AUTO: 0.1 K/UL (ref 0–0.5)
EOSINOPHIL NFR BLD: 0.5 % (ref 0–8)
EOSINOPHIL NFR BLD: 0.7 % (ref 0–8)
EOSINOPHIL NFR BLD: 0.8 % (ref 0–8)
ERYTHROCYTE [DISTWIDTH] IN BLOOD BY AUTOMATED COUNT: 15.8 % (ref 11.5–14.5)
ERYTHROCYTE [DISTWIDTH] IN BLOOD BY AUTOMATED COUNT: 15.9 % (ref 11.5–14.5)
ERYTHROCYTE [DISTWIDTH] IN BLOOD BY AUTOMATED COUNT: 15.9 % (ref 11.5–14.5)
EST. GFR  (AFRICAN AMERICAN): 45.5 ML/MIN/1.73 M^2
EST. GFR  (NON AFRICAN AMERICAN): 39.4 ML/MIN/1.73 M^2
FIO2: 21
GLUCOSE SERPL-MCNC: 97 MG/DL (ref 70–110)
HCO3 UR-SCNC: 25.3 MMOL/L (ref 24–28)
HCT VFR BLD AUTO: 24.4 % (ref 40–54)
HCT VFR BLD AUTO: 25.7 % (ref 40–54)
HCT VFR BLD AUTO: 26.4 % (ref 40–54)
HGB BLD-MCNC: 7.7 G/DL (ref 14–18)
HGB BLD-MCNC: 7.7 G/DL (ref 14–18)
HGB BLD-MCNC: 8 G/DL (ref 14–18)
IMM GRANULOCYTES # BLD AUTO: 0.08 K/UL (ref 0–0.04)
IMM GRANULOCYTES # BLD AUTO: 0.1 K/UL (ref 0–0.04)
IMM GRANULOCYTES # BLD AUTO: 0.11 K/UL (ref 0–0.04)
IMM GRANULOCYTES NFR BLD AUTO: 0.8 % (ref 0–0.5)
IMM GRANULOCYTES NFR BLD AUTO: 1.1 % (ref 0–0.5)
IMM GRANULOCYTES NFR BLD AUTO: 1.1 % (ref 0–0.5)
INR PPP: 2.9 (ref 0.8–1.2)
LDH SERPL L TO P-CCNC: 425 U/L (ref 110–260)
LYMPHOCYTES # BLD AUTO: 0.7 K/UL (ref 1–4.8)
LYMPHOCYTES # BLD AUTO: 0.9 K/UL (ref 1–4.8)
LYMPHOCYTES # BLD AUTO: 1 K/UL (ref 1–4.8)
LYMPHOCYTES NFR BLD: 7.4 % (ref 18–48)
LYMPHOCYTES NFR BLD: 9.3 % (ref 18–48)
LYMPHOCYTES NFR BLD: 9.9 % (ref 18–48)
MAGNESIUM SERPL-MCNC: 1.8 MG/DL (ref 1.6–2.6)
MCH RBC QN AUTO: 26.6 PG (ref 27–31)
MCH RBC QN AUTO: 27 PG (ref 27–31)
MCH RBC QN AUTO: 27.4 PG (ref 27–31)
MCHC RBC AUTO-ENTMCNC: 30 G/DL (ref 32–36)
MCHC RBC AUTO-ENTMCNC: 30.3 G/DL (ref 32–36)
MCHC RBC AUTO-ENTMCNC: 31.6 G/DL (ref 32–36)
MCV RBC AUTO: 87 FL (ref 82–98)
MCV RBC AUTO: 89 FL (ref 82–98)
MCV RBC AUTO: 89 FL (ref 82–98)
MODE: ABNORMAL
MONOCYTES # BLD AUTO: 1.1 K/UL (ref 0.3–1)
MONOCYTES NFR BLD: 10.7 % (ref 4–15)
MONOCYTES NFR BLD: 11 % (ref 4–15)
MONOCYTES NFR BLD: 11.5 % (ref 4–15)
NEUTROPHILS # BLD AUTO: 7.1 K/UL (ref 1.8–7.7)
NEUTROPHILS # BLD AUTO: 7.6 K/UL (ref 1.8–7.7)
NEUTROPHILS # BLD AUTO: 7.9 K/UL (ref 1.8–7.7)
NEUTROPHILS NFR BLD: 77.1 % (ref 38–73)
NEUTROPHILS NFR BLD: 77.2 % (ref 38–73)
NEUTROPHILS NFR BLD: 80 % (ref 38–73)
NRBC BLD-RTO: 0 /100 WBC
PCO2 BLDA: 46.4 MMHG (ref 35–45)
PH SMN: 7.34 [PH] (ref 7.35–7.45)
PHOSPHATE SERPL-MCNC: 3.5 MG/DL (ref 2.7–4.5)
PLATELET # BLD AUTO: 161 K/UL (ref 150–350)
PLATELET # BLD AUTO: 163 K/UL (ref 150–350)
PLATELET # BLD AUTO: 164 K/UL (ref 150–350)
PMV BLD AUTO: 10.3 FL (ref 9.2–12.9)
PMV BLD AUTO: 10.4 FL (ref 9.2–12.9)
PMV BLD AUTO: 10.4 FL (ref 9.2–12.9)
PO2 BLDA: 32 MMHG (ref 40–60)
POC BE: 0 MMOL/L
POC SATURATED O2: 58 % (ref 95–100)
POC TCO2: 27 MMOL/L (ref 24–29)
POTASSIUM SERPL-SCNC: 4.2 MMOL/L (ref 3.5–5.1)
PROTHROMBIN TIME: 27.3 SEC (ref 9–12.5)
RBC # BLD AUTO: 2.81 M/UL (ref 4.6–6.2)
RBC # BLD AUTO: 2.89 M/UL (ref 4.6–6.2)
RBC # BLD AUTO: 2.96 M/UL (ref 4.6–6.2)
SAMPLE: ABNORMAL
SITE: ABNORMAL
SODIUM SERPL-SCNC: 134 MMOL/L (ref 136–145)
WBC # BLD AUTO: 9.2 K/UL (ref 3.9–12.7)
WBC # BLD AUTO: 9.79 K/UL (ref 3.9–12.7)
WBC # BLD AUTO: 9.88 K/UL (ref 3.9–12.7)

## 2020-07-12 PROCEDURE — 27000248 HC VAD-ADDITIONAL DAY

## 2020-07-12 PROCEDURE — 25000003 PHARM REV CODE 250: Performed by: STUDENT IN AN ORGANIZED HEALTH CARE EDUCATION/TRAINING PROGRAM

## 2020-07-12 PROCEDURE — 99233 PR SUBSEQUENT HOSPITAL CARE,LEVL III: ICD-10-PCS | Mod: ,,, | Performed by: INTERNAL MEDICINE

## 2020-07-12 PROCEDURE — 63600175 PHARM REV CODE 636 W HCPCS

## 2020-07-12 PROCEDURE — 85025 COMPLETE CBC W/AUTO DIFF WBC: CPT | Mod: 91

## 2020-07-12 PROCEDURE — 99900035 HC TECH TIME PER 15 MIN (STAT)

## 2020-07-12 PROCEDURE — 83615 LACTATE (LD) (LDH) ENZYME: CPT

## 2020-07-12 PROCEDURE — 80048 BASIC METABOLIC PNL TOTAL CA: CPT

## 2020-07-12 PROCEDURE — 25000003 PHARM REV CODE 250: Performed by: PHYSICIAN ASSISTANT

## 2020-07-12 PROCEDURE — 25000003 PHARM REV CODE 250

## 2020-07-12 PROCEDURE — 99233 SBSQ HOSP IP/OBS HIGH 50: CPT | Mod: ,,, | Performed by: INTERNAL MEDICINE

## 2020-07-12 PROCEDURE — 85730 THROMBOPLASTIN TIME PARTIAL: CPT

## 2020-07-12 PROCEDURE — 93750 PR INTERROGATE VENT ASSIST DEV, IN PERSON, W PHYSICIAN ANALYSIS: ICD-10-PCS | Mod: ,,, | Performed by: INTERNAL MEDICINE

## 2020-07-12 PROCEDURE — 83735 ASSAY OF MAGNESIUM: CPT

## 2020-07-12 PROCEDURE — 84100 ASSAY OF PHOSPHORUS: CPT

## 2020-07-12 PROCEDURE — 85610 PROTHROMBIN TIME: CPT

## 2020-07-12 PROCEDURE — 93750 INTERROGATION VAD IN PERSON: CPT | Mod: ,,, | Performed by: INTERNAL MEDICINE

## 2020-07-12 PROCEDURE — 20600001 HC STEP DOWN PRIVATE ROOM

## 2020-07-12 PROCEDURE — 25000003 PHARM REV CODE 250: Performed by: INTERNAL MEDICINE

## 2020-07-12 RX ORDER — WARFARIN 2.5 MG/1
2.5 TABLET ORAL DAILY
Status: DISCONTINUED | OUTPATIENT
Start: 2020-07-12 | End: 2020-07-14 | Stop reason: HOSPADM

## 2020-07-12 RX ORDER — SYRING-NEEDL,DISP,INSUL,0.3 ML 29 G X1/2"
148 SYRINGE, EMPTY DISPOSABLE MISCELLANEOUS ONCE
Status: COMPLETED | OUTPATIENT
Start: 2020-07-12 | End: 2020-07-12

## 2020-07-12 RX ADMIN — ZOLPIDEM TARTRATE 10 MG: 5 TABLET ORAL at 11:07

## 2020-07-12 RX ADMIN — MAGNESIUM CITRATE 148 ML: 1.75 LIQUID ORAL at 11:07

## 2020-07-12 RX ADMIN — CEFEPIME 1 G: 1 INJECTION, POWDER, FOR SOLUTION INTRAMUSCULAR; INTRAVENOUS at 03:07

## 2020-07-12 RX ADMIN — MELATONIN 1000 UNITS: at 08:07

## 2020-07-12 RX ADMIN — WARFARIN SODIUM 2.5 MG: 2.5 TABLET ORAL at 04:07

## 2020-07-12 RX ADMIN — AMLODIPINE BESYLATE 10 MG: 10 TABLET ORAL at 08:07

## 2020-07-12 RX ADMIN — ALLOPURINOL 100 MG: 100 TABLET ORAL at 08:07

## 2020-07-12 RX ADMIN — CEFEPIME 1 G: 1 INJECTION, POWDER, FOR SOLUTION INTRAMUSCULAR; INTRAVENOUS at 11:07

## 2020-07-12 RX ADMIN — GUANFACINE 2 MG: 1 TABLET ORAL at 08:07

## 2020-07-12 RX ADMIN — PAROXETINE HYDROCHLORIDE 10 MG: 10 TABLET, FILM COATED ORAL at 08:07

## 2020-07-12 RX ADMIN — FERROUS GLUCONATE TAB 324 MG (37.5 MG ELEMENTAL IRON) 324 MG: 324 (37.5 FE) TAB at 11:07

## 2020-07-12 RX ADMIN — ASPIRIN 81 MG: 81 TABLET, COATED ORAL at 08:07

## 2020-07-12 RX ADMIN — PANTOPRAZOLE SODIUM 40 MG: 40 TABLET, DELAYED RELEASE ORAL at 11:07

## 2020-07-12 RX ADMIN — CEFEPIME 1 G: 1 INJECTION, POWDER, FOR SOLUTION INTRAMUSCULAR; INTRAVENOUS at 08:07

## 2020-07-12 RX ADMIN — COLCHICINE 0.6 MG: 0.6 TABLET, FILM COATED ORAL at 08:07

## 2020-07-12 RX ADMIN — PREDNISONE 2.5 MG: 2.5 TABLET ORAL at 08:07

## 2020-07-12 RX ADMIN — AMIODARONE HYDROCHLORIDE 200 MG: 200 TABLET ORAL at 08:07

## 2020-07-12 RX ADMIN — DOXAZOSIN 8 MG: 8 TABLET ORAL at 08:07

## 2020-07-12 NOTE — PLAN OF CARE
Plan of care discussed with patient.  Patient ambulating independently, fall precautions in place. LVAD DP and numbers WNL, smooth LVAD hum. Patient has no complaints of pain. Lagunas removed today. Pt has life vest in place and dressing over left ICD removal site. Dressing to be removed by MD tomorrow. Discussed medications and care. Patient has no questions at this time. Will continue to monitor.

## 2020-07-12 NOTE — ASSESSMENT & PLAN NOTE
BiV-Icd Medtronic (2014 Dr. Chiu), successful DFT at 35J on 10/18/2019. Multiple VT episodes,  generator change (BiV ICD -> dual ICD),  device revision (addition of a new RV/ICD lead) and another VF induction via ICD on 3/2020. Presented from device clinic with device erosion.   - S/p device extraction on 7/10  - Continue pressure dressing over the weekend  - Patient needs a lifevest at discharge  - Will need to be evaluated outpatient for SUQ-ICD versus reimplant single chamber ICD following completion of antibiotics

## 2020-07-12 NOTE — PROGRESS NOTES
[facility]  Cardiac Electrophysiology  Progress Note    Admission Date: 7/1/2020  Code Status: Full Code   Attending Physician: Amparo Lopez MD   Expected Discharge Date: 7/12/2020  Principal Problem:ICD (implantable cardioverter-defibrillator) infection    Subjective:     Interval History: no acute events overnight. Hgb 7.7 this AM. Should pain improved.    ROS otherwise negative  Objective:     Vital Signs (Most Recent):  Temp: 97.3 °F (36.3 °C) (07/12/20 0745)  Pulse: 77 (07/12/20 1000)  Resp: 16 (07/12/20 0745)  BP: (!) 74/0 (07/12/20 0745)  SpO2: 97 % (07/12/20 0745) Vital Signs (24h Range):  Temp:  [97.3 °F (36.3 °C)-98.6 °F (37 °C)] 97.3 °F (36.3 °C)  Pulse:  [74-95] 77  Resp:  [15-25] 16  SpO2:  [90 %-97 %] 97 %  BP: (72-82)/(0) 74/0  Arterial Line BP: (82)/(77) 82/77     Weight: 122.1 kg (269 lb 2.9 oz)  Body mass index is 35.51 kg/m².     SpO2: 97 %  O2 Device (Oxygen Therapy): room air    Physical Exam   Constitutional: He appears well-developed and well-nourished.   Eyes: Right eye exhibits no discharge. Left eye exhibits no discharge. No scleral icterus.   Cardiovascular: Normal rate and regular rhythm.   Groin sites without hematoma or bleeding   Pulmonary/Chest: Effort normal and breath sounds normal. No respiratory distress.   Musculoskeletal:         General: No edema.      Comments: Pressure dressing over left shoulder. No hematoma or active bleeding noted.   Neurological: He is alert.   Nursing note and vitals reviewed.      Significant Labs: All pertinent lab results from the last 24 hours have been reviewed.    Significant Imaging: X-Ray: Reviewed    Assessment and Plan:     * ICD (implantable cardioverter-defibrillator) infection  BiV-Icd Medtronic (2014 Dr. Chiu), successful DFT at 35J on 10/18/2019. Multiple VT episodes,  generator change (BiV ICD -> dual ICD),  device revision (addition of a new RV/ICD lead) and another VF induction via ICD on 3/2020. Presented  from device clinic with device erosion.   - S/p device extraction on 7/10  - Please repeat HGB this afternoon  - Continue pressure dressing over the weekend  - Patient needs a lifevest at discharge  - Will need to be evaluated outpatient for SubQ ICD versus reimplant single chamber ICD following completion of antibiotics        Mkie Lehman MD  Cardiac Electrophysiology  [facility]

## 2020-07-12 NOTE — PROGRESS NOTES
[facility]  Heart Transplant  Progress Note    Patient Name: Tim Richards  MRN: [id number]  Admission Date: 7/1/2020  Hospital Length of Stay: 11 days  Attending Physician: Amparo Lopez MD  Primary Care Provider: Power Connors MD  Principal Problem:ICD (implantable cardioverter-defibrillator) infection    Subjective:     Interval History: Transferred to floor. No issues overnight Hgb 8 and repeat 7.7 this AM. No overt signs of bleeding.     Continuous Infusions:  Scheduled Meds:   allopurinoL  100 mg Oral Daily    amiodarone  200 mg Oral Daily    amLODIPine  10 mg Oral Daily    aspirin  81 mg Oral Daily    ceFEPime (MAXIPIME) IVPB  1 g Intravenous Q8H    colchicine  0.6 mg Oral Daily    doxazosin  8 mg Oral Q12H    ferrous gluconate  324 mg Oral Daily with lunch    fluticasone propionate  2 spray Each Nostril Daily    guanFACINE  2 mg Oral QHS    pantoprazole  40 mg Oral Daily with lunch    paroxetine  10 mg Oral QAM    predniSONE  2.5 mg Oral Daily    vitamin D  1,000 Units Oral Daily    warfarin  2.5 mg Oral Daily     PRN Meds:acetaminophen, polyethylene glycol, senna-docusate 8.6-50 mg, zolpidem    Review of patient's allergies indicates:   Allergen Reactions    Lisinopril Anaphylaxis    Hydralazine analogues      Chronic constipation, impotence, dizziness     Objective:     Vital Signs (Most Recent):  Temp: 99.1 °F (37.3 °C) (07/12/20 1137)  Pulse: 81 (07/12/20 1200)  Resp: 16 (07/12/20 1137)  BP: (!) 64/0 (07/12/20 1137)  SpO2: 97 % (07/12/20 0745) Vital Signs (24h Range):  Temp:  [97.3 °F (36.3 °C)-99.1 °F (37.3 °C)] 99.1 °F (37.3 °C)  Pulse:  [74-94] 81  Resp:  [15-25] 16  SpO2:  [90 %-97 %] 97 %  BP: (64-82)/(0) 64/0     Patient Vitals for the past 72 hrs (Last 3 readings):   Weight   07/11/20 0525 122.1 kg (269 lb 2.9 oz)   07/10/20 1900 121.1 kg (267 lb)   07/10/20 0854 121.1 kg (267 lb)     Body mass index is 35.51 kg/m².      Intake/Output Summary  (Last 24 hours) at 7/12/2020 1242  Last data filed at 7/12/2020 1000  Gross per 24 hour   Intake 850 ml   Output 1325 ml   Net -475 ml       Physical Exam  Vitals signs and nursing note reviewed.   Constitutional:       Appearance: Normal appearance.   HENT:      Head: Normocephalic and atraumatic.      Nose: No congestion.      Mouth/Throat:      Mouth: Mucous membranes are moist.   Eyes:      Extraocular Movements: Extraocular movements intact.      Pupils: Pupils are equal, round, and reactive to light.   Neck:      Musculoskeletal: Normal range of motion.   Cardiovascular:      Rate and Rhythm: Normal rate and regular rhythm.      Comments: Smooth VAD hum    Left upper chest pressure dressing.   Pulmonary:      Effort: Pulmonary effort is normal. No respiratory distress.      Breath sounds: Normal breath sounds. No rales.   Abdominal:      General: Abdomen is flat. Bowel sounds are normal. There is no distension.      Tenderness: There is no abdominal tenderness. There is no guarding.   Musculoskeletal:         General: No swelling.   Skin:     General: Skin is warm.   Neurological:      General: No focal deficit present.      Mental Status: He is alert.   Psychiatric:         Mood and Affect: Mood normal.         Significant Labs:  CBC:  Recent Labs   Lab 07/11/20  0452 07/12/20  0505 07/12/20  0756   WBC 19.94* 9.79 9.20   RBC 3.82* 2.96* 2.89*   HGB 10.5* 8.0* 7.7*   HCT 34.0* 26.4* 25.7*    161 163   MCV 89 89 89   MCH 27.5 27.0 26.6*   MCHC 30.9* 30.3* 30.0*     BNP:  Recent Labs   Lab 07/06/20  0702 07/08/20  0511 07/10/20  0535   * 74 141*     CMP:  Recent Labs   Lab 07/06/20  0701  07/08/20  0511  07/10/20  0535 07/11/20  0452 07/11/20  0832 07/12/20  0505   GLU 89   < > 84   < > 86 164* 139* 97   CALCIUM 9.3   < > 9.3   < > 9.5 8.8 8.5* 8.3*   ALBUMIN 3.4*  --  3.5  --  3.5  --   --   --    PROT 7.1  --  7.4  --  7.3  --   --   --    *   < > 136   < > 139 133* 135* 134*   K 4.1   < >  3.7   < > 4.1 5.5* 5.1 4.2   CO2 23   < > 24   < > 27 22* 24 22*      < > 102   < > 105 104 104 104   BUN 15   < > 14   < > 12 19 20 18   CREATININE 1.6*   < > 1.7*   < > 1.8* 2.2* 2.3* 1.9*   ALKPHOS 98  --  94  --  93  --   --   --    ALT 21  --  23  --  24  --   --   --    AST 24  --  30  --  30  --   --   --    BILITOT 0.4  --  0.5  --  0.4  --   --   --     < > = values in this interval not displayed.      Coagulation:   Recent Labs   Lab 07/10/20  0535 07/11/20  0452 07/12/20  0505   INR 2.1* 2.5* 2.9*   APTT 34.7* 28.8 34.1*     LDH:  Recent Labs   Lab 07/10/20  0535 07/11/20  0452 07/12/20  0505   * 370* 425*     Microbiology:  Microbiology Results (last 7 days)     Procedure Component Value Units Date/Time    Culture, Anaerobe [[id number]] Collected: 07/10/20 1409    Order Status: Completed Specimen: Incision site from Heart Updated: 07/11/20 1403     Anaerobic Culture Culture in progress    Narrative:      Old RV lead    Culture, Anaerobe [[id number]] Collected: 07/10/20 1433    Order Status: Completed Specimen: Incision site from Heart Updated: 07/11/20 1403     Anaerobic Culture Culture in progress    Narrative:      RA lead    Culture, Anaerobe [[id number]] Collected: 07/10/20 1344    Order Status: Completed Specimen: Incision site from Heart Updated: 07/11/20 1403     Anaerobic Culture Culture in progress    Narrative:      LV lead    Culture, Anaerobe [[id number]] Collected: 07/10/20 1237    Order Status: Completed Specimen: Incision site from Heart Updated: 07/11/20 1403     Anaerobic Culture Culture in progress    Narrative:      New RV lead    Aerobic culture [[id number]] Collected: 07/10/20 1433    Order Status: Completed Specimen: Incision site from Heart Updated: 07/11/20 0734     Aerobic Bacterial Culture No growth    Narrative:      RA lead    Aerobic culture [[id number]] Collected: 07/10/20 1344    Order Status: Completed Specimen: Incision site from Heart Updated: 07/11/20 0734      Aerobic Bacterial Culture No growth    Narrative:      LV lead    Aerobic culture [[id number]] Collected: 07/10/20 1409    Order Status: Completed Specimen: Incision site from Heart Updated: 07/11/20 0734     Aerobic Bacterial Culture No growth    Narrative:      Old RV lead    Aerobic culture [[id number]] Collected: 07/10/20 1237    Order Status: Completed Specimen: Incision site from Heart Updated: 07/11/20 0734     Aerobic Bacterial Culture No growth    Narrative:      New RV lead    Culture, Anaerobe [[id number]] Collected: 07/01/20 1741    Order Status: Completed Specimen: Incision site from Chest, Left Updated: 07/09/20 0906     Anaerobic Culture No anaerobes isolated    Culture, Anaerobe [[id number]] Collected: 07/01/20 1741    Order Status: Completed Specimen: Skin from Abdomen Updated: 07/09/20 0742     Anaerobic Culture No anaerobes isolated    Narrative:      Drive line site    Blood culture [[id number]] Collected: 07/03/20 0712    Order Status: Completed Specimen: Blood Updated: 07/08/20 0812     Blood Culture, Routine No growth after 5 days.    Blood culture [[id number]] Collected: 07/03/20 0711    Order Status: Completed Specimen: Blood Updated: 07/08/20 0812     Blood Culture, Routine No growth after 5 days.    Blood culture [[id number]] Collected: 07/02/20 1110    Order Status: Completed Specimen: Blood Updated: 07/07/20 1412     Blood Culture, Routine No growth after 5 days.    Culture, Anaerobe [[id number]]  (Abnormal) Collected: 07/01/20 2153    Order Status: Completed Specimen: Wound from Abdomen Updated: 07/07/20 1316     Anaerobic Culture FUSOBACTERIUM NUCLEATUM  Moderate        PREVOTELLA (B.) MELANINOGENICA  Moderate      Narrative:      Left chest incision.          I have reviewed all pertinent labs within the past 24 hours.    Estimated Creatinine Clearance: 61.6 mL/min (A) (based on SCr of 1.9 mg/dL (H)).    Diagnostic Results:  I have reviewed all pertinent imaging results/findings within the  past 24 hours.    Assessment and Plan:     55 yo AAM with DCM, HBP, CHF stage D s/p HM 2, ICD lead replacement 03/09/2020, hyperthyroidism,  sent in from clinic with a ICD pocket site infection. Patient wife reports yellow pus oozing from site this past week. Was prescribed antibiotics on Monday 6/29/20, and advised to come get admitted that day, but patient reported he didn't think it was necessary at that time. Denies fever, but reports intermittent nausea and chills, and Left side of Left hand tingling.      VAD parameters at baseline.  Pt speed decreased to 9800 rpms from 10,000 on 06/12/2020    ICD device check - 07/01/2020 --. Device interrogation revealed HV x 1 on 6/27/20 @ 3:41 pm for MMVT, Type 2 break.,CL-188 bpm. Patient reported sitting in car bending over when it occurred, not doing anything strenuous. Patient denies LOC. Patient reports overall feeling palpitations, tired, no energy, and COLEMAN, even prior to HV therapy.          * ICD (implantable cardioverter-defibrillator) infection  - presented with device pocket infection  - has been on oral doxycycline at home for the last 1 week with no improvement  - ICD device check - 07/01/2020 --. Device interrogation revealed HV x 1 on 6/27/20 @ 3:41 pm for MMVT, Type 2 break.,CL-188 bpm.  - Continue cefepime 2 wks after device extraction (7/24)  - Pressure dressing in place until Monday per EP   - Will order LifeVest tomorrow      Acute blood loss anemia  - Hgb dropped to 7.7 this AM from 10.5 yesterday. No overt signs of bleeding. Will repeat later today.     Gout  - Uric acid  - S/W colchicine and allopurinol continued    VT (ventricular tachycardia)  - device check showed a VT episode with shock on 06/27  - patient denies any LOC   - c/w amiodarone 200 mg daily.   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 Decrease dose to 2.5 mg daily    Procedure: Device Interrogation Including analysis of device parameters  Current Settings: Ventricular Assist Device  Review of device function is stable    TXP LVAD INTERROGATIONS 7/12/2020 7/12/2020 7/12/2020 7/12/2020 7/12/2020 7/12/2020 7/12/2020   Type HeartMate II HeartMate II HeartMate II HeartMate II HeartMate II HeartMate II HeartMate II   Flow 6 5.4 5.8 5.5 5.8 5.7 5.7   Speed 9800 9800 9800 9800 9800 9800 9800   PI 2.5 2.9 2.7 2.7 3.0 3.7 2.7   Power (Jackson) 6.8 6.4 6.6 6.4 6.6 6.6 6.6   LSL - 9400 9400 9400 9400 9400 9400   Pulsatility Intermittent pulse Intermittent pulse Intermittent pulse Intermittent pulse Intermittent pulse Intermittent pulse Intermittent pulse           Sanya Zavala MD  Heart Transplant  [facility]

## 2020-07-12 NOTE — SUBJECTIVE & OBJECTIVE
Interval History: no acute events overnight. Hgb 7.7 this AM. Should pain improved.    ROS otherwise negative  Objective:     Vital Signs (Most Recent):  Temp: 97.3 °F (36.3 °C) (07/12/20 0745)  Pulse: 77 (07/12/20 1000)  Resp: 16 (07/12/20 0745)  BP: (!) 74/0 (07/12/20 0745)  SpO2: 97 % (07/12/20 0745) Vital Signs (24h Range):  Temp:  [97.3 °F (36.3 °C)-98.6 °F (37 °C)] 97.3 °F (36.3 °C)  Pulse:  [74-95] 77  Resp:  [15-25] 16  SpO2:  [90 %-97 %] 97 %  BP: (72-82)/(0) 74/0  Arterial Line BP: (82)/(77) 82/77     Weight: 122.1 kg (269 lb 2.9 oz)  Body mass index is 35.51 kg/m².     SpO2: 97 %  O2 Device (Oxygen Therapy): room air    Physical Exam   Constitutional: He appears well-developed and well-nourished.   Eyes: Right eye exhibits no discharge. Left eye exhibits no discharge. No scleral icterus.   Cardiovascular: Normal rate and regular rhythm.   Groin sites without hematoma or bleeding   Pulmonary/Chest: Effort normal and breath sounds normal. No respiratory distress.   Musculoskeletal:         General: No edema.      Comments: Pressure dressing over left shoulder. No hematoma or active bleeding noted.   Neurological: He is alert.   Nursing note and vitals reviewed.      Significant Labs: All pertinent lab results from the last 24 hours have been reviewed.    Significant Imaging: X-Ray: Reviewed

## 2020-07-12 NOTE — ASSESSMENT & PLAN NOTE
- antiplatelets -aspirin 81  - anticoagulation - continue warfarin. Decrease dose to 2.5 mg daily    Procedure: Device Interrogation Including analysis of device parameters  Current Settings: Ventricular Assist Device  Review of device function is stable    TXP LVAD INTERROGATIONS 7/12/2020 7/12/2020 7/12/2020 7/12/2020 7/12/2020 7/12/2020 7/12/2020   Type HeartMate II HeartMate II HeartMate II HeartMate II HeartMate II HeartMate II HeartMate II   Flow 6 5.4 5.8 5.5 5.8 5.7 5.7   Speed 9800 9800 9800 9800 9800 9800 9800   PI 2.5 2.9 2.7 2.7 3.0 3.7 2.7   Power (Jackson) 6.8 6.4 6.6 6.4 6.6 6.6 6.6   LSL - 9400 9400 9400 9400 9400 9400   Pulsatility Intermittent pulse Intermittent pulse Intermittent pulse Intermittent pulse Intermittent pulse Intermittent pulse Intermittent pulse

## 2020-07-12 NOTE — ASSESSMENT & PLAN NOTE
- Hgb dropped to 7.7 this AM from 10.5 yesterday. No overt signs of bleeding. Will repeat later today.

## 2020-07-12 NOTE — NURSING
MD Brandon notified of decreased UOP 20-30 cc/hr, PICC CVP 1. Lagunas to remain for accurate I&Os.

## 2020-07-12 NOTE — PROGRESS NOTES
PCT notified RN that pt was eating a big bowl of gumbo brought by the wife right after wife requested for a pitcher of water for pt. RN educated pt that he is not supposed to eat outside food due to being on a cardiac diet. Pt stated that it is Sunday, so it's ok because it's a special day. RN educated again that he is not supposed to eat outside food unless approved by provider. Pt also educated on fluid restriction and that he can have cups of water, but not a pitcher.     HTS notified and stated that they would talk to the pt about not eating outside food. WCTM.

## 2020-07-12 NOTE — PT/OT/SLP PROGRESS
Physical Therapy Screen and Discharge      Patient Name:  Tim Richards   MRN:  [id number]    PT orders received and acknowledged. Pt with ICD extraction 7/10/2020, now with live vest donned. Pt reports he is at baseline mobility, independent with all mobility and ADLs. Pt reports he has been ambulating independently since admission, denies any dizziness, SOB, or recent falls. Pt reports independence with VAD management with no concerns. Pt denies need for acute skilled therapy intervention. PT provided education to pt regarding importance of maintaining frequent activity and ambulating while admitted to maintain independent level of mobility. Pt verbalized understanding. Pt does not require further acute skilled therapy intervention. Discharge from PT services and re-consult if pt experiences a change in status. No billable units this date.           Fifi Peterson, PT

## 2020-07-12 NOTE — NURSING TRANSFER
Nursing Transfer Note      7/12/2020     Transfer To: [id number]    Transfer via bed    Transfer with cardiac monitoring, LVAD Backup Kit, 4x batteries, 4 battery clips, suitcase, lifevest on, lifevest box with battery and , phone, and phone     Transported by KOREY Layton RN    Medicines sent: Yes    Chart send with patient: Yes    Notified: Pt. To notify wife, cell phone in hand.    Patient reassessed at: 07/12/20, 06:10    Upon arrival to floor: cardiac monitor applied, patient oriented to room, call bell in reach and bed in lowest position

## 2020-07-12 NOTE — SUBJECTIVE & OBJECTIVE
Interval History: Transferred to floor. No issues overnight Hgb 8 and repeat 7.7 this AM. No overt signs of bleeding.     Continuous Infusions:  Scheduled Meds:   allopurinoL  100 mg Oral Daily    amiodarone  200 mg Oral Daily    amLODIPine  10 mg Oral Daily    aspirin  81 mg Oral Daily    ceFEPime (MAXIPIME) IVPB  1 g Intravenous Q8H    colchicine  0.6 mg Oral Daily    doxazosin  8 mg Oral Q12H    ferrous gluconate  324 mg Oral Daily with lunch    fluticasone propionate  2 spray Each Nostril Daily    guanFACINE  2 mg Oral QHS    pantoprazole  40 mg Oral Daily with lunch    paroxetine  10 mg Oral QAM    predniSONE  2.5 mg Oral Daily    vitamin D  1,000 Units Oral Daily    warfarin  2.5 mg Oral Daily     PRN Meds:acetaminophen, polyethylene glycol, senna-docusate 8.6-50 mg, zolpidem    Review of patient's allergies indicates:   Allergen Reactions    Lisinopril Anaphylaxis    Hydralazine analogues      Chronic constipation, impotence, dizziness     Objective:     Vital Signs (Most Recent):  Temp: 99.1 °F (37.3 °C) (07/12/20 1137)  Pulse: 81 (07/12/20 1200)  Resp: 16 (07/12/20 1137)  BP: (!) 64/0 (07/12/20 1137)  SpO2: 97 % (07/12/20 0745) Vital Signs (24h Range):  Temp:  [97.3 °F (36.3 °C)-99.1 °F (37.3 °C)] 99.1 °F (37.3 °C)  Pulse:  [74-94] 81  Resp:  [15-25] 16  SpO2:  [90 %-97 %] 97 %  BP: (64-82)/(0) 64/0     Patient Vitals for the past 72 hrs (Last 3 readings):   Weight   07/11/20 0525 122.1 kg (269 lb 2.9 oz)   07/10/20 1900 121.1 kg (267 lb)   07/10/20 0854 121.1 kg (267 lb)     Body mass index is 35.51 kg/m².      Intake/Output Summary (Last 24 hours) at 7/12/2020 1242  Last data filed at 7/12/2020 1000  Gross per 24 hour   Intake 850 ml   Output 1325 ml   Net -475 ml       Physical Exam  Vitals signs and nursing note reviewed.   Constitutional:       Appearance: Normal appearance.   HENT:      Head: Normocephalic and atraumatic.      Nose: No congestion.      Mouth/Throat:      Mouth: Mucous  membranes are moist.   Eyes:      Extraocular Movements: Extraocular movements intact.      Pupils: Pupils are equal, round, and reactive to light.   Neck:      Musculoskeletal: Normal range of motion.   Cardiovascular:      Rate and Rhythm: Normal rate and regular rhythm.      Comments: Smooth VAD hum    Left upper chest pressure dressing.   Pulmonary:      Effort: Pulmonary effort is normal. No respiratory distress.      Breath sounds: Normal breath sounds. No rales.   Abdominal:      General: Abdomen is flat. Bowel sounds are normal. There is no distension.      Tenderness: There is no abdominal tenderness. There is no guarding.   Musculoskeletal:         General: No swelling.   Skin:     General: Skin is warm.   Neurological:      General: No focal deficit present.      Mental Status: He is alert.   Psychiatric:         Mood and Affect: Mood normal.         Significant Labs:  CBC:  Recent Labs   Lab 07/11/20  0452 07/12/20  0505 07/12/20  0756   WBC 19.94* 9.79 9.20   RBC 3.82* 2.96* 2.89*   HGB 10.5* 8.0* 7.7*   HCT 34.0* 26.4* 25.7*    161 163   MCV 89 89 89   MCH 27.5 27.0 26.6*   MCHC 30.9* 30.3* 30.0*     BNP:  Recent Labs   Lab 07/06/20  0702 07/08/20  0511 07/10/20  0535   * 74 141*     CMP:  Recent Labs   Lab 07/06/20  0701  07/08/20  0511  07/10/20  0535 07/11/20  0452 07/11/20  0832 07/12/20  0505   GLU 89   < > 84   < > 86 164* 139* 97   CALCIUM 9.3   < > 9.3   < > 9.5 8.8 8.5* 8.3*   ALBUMIN 3.4*  --  3.5  --  3.5  --   --   --    PROT 7.1  --  7.4  --  7.3  --   --   --    *   < > 136   < > 139 133* 135* 134*   K 4.1   < > 3.7   < > 4.1 5.5* 5.1 4.2   CO2 23   < > 24   < > 27 22* 24 22*      < > 102   < > 105 104 104 104   BUN 15   < > 14   < > 12 19 20 18   CREATININE 1.6*   < > 1.7*   < > 1.8* 2.2* 2.3* 1.9*   ALKPHOS 98  --  94  --  93  --   --   --    ALT 21  --  23  --  24  --   --   --    AST 24  --  30  --  30  --   --   --    BILITOT 0.4  --  0.5  --  0.4  --   --    --     < > = values in this interval not displayed.      Coagulation:   Recent Labs   Lab 07/10/20  0535 07/11/20  0452 07/12/20  0505   INR 2.1* 2.5* 2.9*   APTT 34.7* 28.8 34.1*     LDH:  Recent Labs   Lab 07/10/20  0535 07/11/20  0452 07/12/20  0505   * 370* 425*     Microbiology:  Microbiology Results (last 7 days)     Procedure Component Value Units Date/Time    Culture, Anaerobe [[id number]] Collected: 07/10/20 1409    Order Status: Completed Specimen: Incision site from Heart Updated: 07/11/20 1403     Anaerobic Culture Culture in progress    Narrative:      Old RV lead    Culture, Anaerobe [[id number]] Collected: 07/10/20 1433    Order Status: Completed Specimen: Incision site from Heart Updated: 07/11/20 1403     Anaerobic Culture Culture in progress    Narrative:      RA lead    Culture, Anaerobe [[id number]] Collected: 07/10/20 1344    Order Status: Completed Specimen: Incision site from Heart Updated: 07/11/20 1403     Anaerobic Culture Culture in progress    Narrative:      LV lead    Culture, Anaerobe [[id number]] Collected: 07/10/20 1237    Order Status: Completed Specimen: Incision site from Heart Updated: 07/11/20 1403     Anaerobic Culture Culture in progress    Narrative:      New RV lead    Aerobic culture [[id number]] Collected: 07/10/20 1433    Order Status: Completed Specimen: Incision site from Heart Updated: 07/11/20 0734     Aerobic Bacterial Culture No growth    Narrative:      RA lead    Aerobic culture [[id number]] Collected: 07/10/20 1344    Order Status: Completed Specimen: Incision site from Heart Updated: 07/11/20 0734     Aerobic Bacterial Culture No growth    Narrative:      LV lead    Aerobic culture [[id number]] Collected: 07/10/20 1409    Order Status: Completed Specimen: Incision site from Heart Updated: 07/11/20 0734     Aerobic Bacterial Culture No growth    Narrative:      Old RV lead    Aerobic culture [[id number]] Collected: 07/10/20 1237    Order Status: Completed  Specimen: Incision site from Heart Updated: 07/11/20 0734     Aerobic Bacterial Culture No growth    Narrative:      New RV lead    Culture, Anaerobe [[id number]] Collected: 07/01/20 1741    Order Status: Completed Specimen: Incision site from Chest, Left Updated: 07/09/20 0906     Anaerobic Culture No anaerobes isolated    Culture, Anaerobe [[id number]] Collected: 07/01/20 1741    Order Status: Completed Specimen: Skin from Abdomen Updated: 07/09/20 0742     Anaerobic Culture No anaerobes isolated    Narrative:      Drive line site    Blood culture [[id number]] Collected: 07/03/20 0712    Order Status: Completed Specimen: Blood Updated: 07/08/20 0812     Blood Culture, Routine No growth after 5 days.    Blood culture [[id number]] Collected: 07/03/20 0711    Order Status: Completed Specimen: Blood Updated: 07/08/20 0812     Blood Culture, Routine No growth after 5 days.    Blood culture [[id number]] Collected: 07/02/20 1110    Order Status: Completed Specimen: Blood Updated: 07/07/20 1412     Blood Culture, Routine No growth after 5 days.    Culture, Anaerobe [[id number]]  (Abnormal) Collected: 07/01/20 2153    Order Status: Completed Specimen: Wound from Abdomen Updated: 07/07/20 1316     Anaerobic Culture FUSOBACTERIUM NUCLEATUM  Moderate        PREVOTELLA (B.) MELANINOGENICA  Moderate      Narrative:      Left chest incision.          I have reviewed all pertinent labs within the past 24 hours.    Estimated Creatinine Clearance: 61.6 mL/min (A) (based on SCr of 1.9 mg/dL (H)).    Diagnostic Results:  I have reviewed all pertinent imaging results/findings within the past 24 hours.

## 2020-07-13 VITALS
RESPIRATION RATE: 16 BRPM | BODY MASS INDEX: 35.91 KG/M2 | TEMPERATURE: 99 F | HEART RATE: 78 BPM | WEIGHT: 270.94 LBS | HEIGHT: 73 IN | OXYGEN SATURATION: 94 % | SYSTOLIC BLOOD PRESSURE: 74 MMHG

## 2020-07-13 LAB
ABO + RH BLD: NORMAL
ALBUMIN SERPL BCP-MCNC: 2.9 G/DL (ref 3.5–5.2)
ALP SERPL-CCNC: 83 U/L (ref 55–135)
ALT SERPL W/O P-5'-P-CCNC: 35 U/L (ref 10–44)
ANION GAP SERPL CALC-SCNC: 6 MMOL/L (ref 8–16)
APTT BLDCRRT: 36.6 SEC (ref 21–32)
AST SERPL-CCNC: 48 U/L (ref 10–40)
BACTERIA SPEC AEROBE CULT: NO GROWTH
BASOPHILS # BLD AUTO: 0.02 K/UL (ref 0–0.2)
BASOPHILS NFR BLD: 0.3 % (ref 0–1.9)
BILIRUB DIRECT SERPL-MCNC: 0.2 MG/DL (ref 0.1–0.3)
BILIRUB SERPL-MCNC: 0.3 MG/DL (ref 0.1–1)
BLD GP AB SCN CELLS X3 SERPL QL: NORMAL
BLD PROD TYP BPU: NORMAL
BLOOD UNIT EXPIRATION DATE: NORMAL
BLOOD UNIT TYPE CODE: 5100
BLOOD UNIT TYPE: NORMAL
BNP SERPL-MCNC: 297 PG/ML (ref 0–99)
BSA FOR ECHO PROCEDURE: 2.5 M2
BUN SERPL-MCNC: 13 MG/DL (ref 6–20)
CALCIUM SERPL-MCNC: 8.6 MG/DL (ref 8.7–10.5)
CHLORIDE SERPL-SCNC: 104 MMOL/L (ref 95–110)
CO2 SERPL-SCNC: 25 MMOL/L (ref 23–29)
CODING SYSTEM: NORMAL
CREAT SERPL-MCNC: 1.6 MG/DL (ref 0.5–1.4)
CRP SERPL-MCNC: 39.4 MG/L (ref 0–8.2)
DIFFERENTIAL METHOD: ABNORMAL
DISPENSE STATUS: NORMAL
EOSINOPHIL # BLD AUTO: 0.2 K/UL (ref 0–0.5)
EOSINOPHIL NFR BLD: 1.9 % (ref 0–8)
ERYTHROCYTE [DISTWIDTH] IN BLOOD BY AUTOMATED COUNT: 15.9 % (ref 11.5–14.5)
EST. GFR  (AFRICAN AMERICAN): 56 ML/MIN/1.73 M^2
EST. GFR  (NON AFRICAN AMERICAN): 48.5 ML/MIN/1.73 M^2
GLUCOSE SERPL-MCNC: 143 MG/DL (ref 70–110)
GLUCOSE SERPL-MCNC: 80 MG/DL (ref 70–110)
HCO3 UR-SCNC: 25.3 MMOL/L (ref 24–28)
HCT VFR BLD AUTO: 24.4 % (ref 40–54)
HCT VFR BLD CALC: 36 %PCV (ref 36–54)
HGB BLD-MCNC: 7.5 G/DL (ref 14–18)
IMM GRANULOCYTES # BLD AUTO: 0.05 K/UL (ref 0–0.04)
IMM GRANULOCYTES NFR BLD AUTO: 0.6 % (ref 0–0.5)
INR PPP: 2.7 (ref 0.8–1.2)
LDH SERPL L TO P-CCNC: 302 U/L (ref 110–260)
LYMPHOCYTES # BLD AUTO: 0.9 K/UL (ref 1–4.8)
LYMPHOCYTES NFR BLD: 11.8 % (ref 18–48)
MAGNESIUM SERPL-MCNC: 1.8 MG/DL (ref 1.6–2.6)
MCH RBC QN AUTO: 28.1 PG (ref 27–31)
MCHC RBC AUTO-ENTMCNC: 30.7 G/DL (ref 32–36)
MCV RBC AUTO: 91 FL (ref 82–98)
MONOCYTES # BLD AUTO: 0.9 K/UL (ref 0.3–1)
MONOCYTES NFR BLD: 11.8 % (ref 4–15)
NEUTROPHILS # BLD AUTO: 5.9 K/UL (ref 1.8–7.7)
NEUTROPHILS NFR BLD: 73.6 % (ref 38–73)
NRBC BLD-RTO: 0 /100 WBC
NUM UNITS TRANS PACKED RBC: NORMAL
PCO2 BLDA: 49.6 MMHG (ref 35–45)
PH SMN: 7.32 [PH] (ref 7.35–7.45)
PHOSPHATE SERPL-MCNC: 2.7 MG/DL (ref 2.7–4.5)
PLATELET # BLD AUTO: 167 K/UL (ref 150–350)
PMV BLD AUTO: 10.3 FL (ref 9.2–12.9)
PO2 BLDA: 321 MMHG (ref 80–100)
POC BE: -1 MMOL/L
POC IONIZED CALCIUM: 1.15 MMOL/L (ref 1.06–1.42)
POC SATURATED O2: 100 % (ref 95–100)
POC TCO2: 27 MMOL/L (ref 23–27)
POTASSIUM BLD-SCNC: 4.2 MMOL/L (ref 3.5–5.1)
POTASSIUM SERPL-SCNC: 3.9 MMOL/L (ref 3.5–5.1)
PREALB SERPL-MCNC: 17 MG/DL (ref 20–43)
PROT SERPL-MCNC: 5.8 G/DL (ref 6–8.4)
PROTHROMBIN TIME: 25.1 SEC (ref 9–12.5)
RBC # BLD AUTO: 2.67 M/UL (ref 4.6–6.2)
SAMPLE: ABNORMAL
SODIUM BLD-SCNC: 138 MMOL/L (ref 136–145)
SODIUM SERPL-SCNC: 135 MMOL/L (ref 136–145)
WBC # BLD AUTO: 7.96 K/UL (ref 3.9–12.7)

## 2020-07-13 PROCEDURE — 83880 ASSAY OF NATRIURETIC PEPTIDE: CPT

## 2020-07-13 PROCEDURE — 63600175 PHARM REV CODE 636 W HCPCS

## 2020-07-13 PROCEDURE — 25000003 PHARM REV CODE 250: Performed by: STUDENT IN AN ORGANIZED HEALTH CARE EDUCATION/TRAINING PROGRAM

## 2020-07-13 PROCEDURE — 80048 BASIC METABOLIC PNL TOTAL CA: CPT

## 2020-07-13 PROCEDURE — 83735 ASSAY OF MAGNESIUM: CPT

## 2020-07-13 PROCEDURE — 99238 HOSP IP/OBS DSCHRG MGMT 30/<: CPT | Mod: ,,, | Performed by: INTERNAL MEDICINE

## 2020-07-13 PROCEDURE — P9016 RBC LEUKOCYTES REDUCED: HCPCS

## 2020-07-13 PROCEDURE — 99238 PR HOSPITAL DISCHARGE DAY,<30 MIN: ICD-10-PCS | Mod: ,,, | Performed by: INTERNAL MEDICINE

## 2020-07-13 PROCEDURE — 63600175 PHARM REV CODE 636 W HCPCS: Performed by: INTERNAL MEDICINE

## 2020-07-13 PROCEDURE — 84134 ASSAY OF PREALBUMIN: CPT

## 2020-07-13 PROCEDURE — 84100 ASSAY OF PHOSPHORUS: CPT

## 2020-07-13 PROCEDURE — 25000003 PHARM REV CODE 250

## 2020-07-13 PROCEDURE — 99233 SBSQ HOSP IP/OBS HIGH 50: CPT | Mod: ,,, | Performed by: INTERNAL MEDICINE

## 2020-07-13 PROCEDURE — 86140 C-REACTIVE PROTEIN: CPT

## 2020-07-13 PROCEDURE — 82803 BLOOD GASES ANY COMBINATION: CPT

## 2020-07-13 PROCEDURE — 93750 INTERROGATION VAD IN PERSON: CPT | Mod: ,,, | Performed by: INTERNAL MEDICINE

## 2020-07-13 PROCEDURE — 99233 PR SUBSEQUENT HOSPITAL CARE,LEVL III: ICD-10-PCS | Mod: ,,, | Performed by: INTERNAL MEDICINE

## 2020-07-13 PROCEDURE — 85610 PROTHROMBIN TIME: CPT

## 2020-07-13 PROCEDURE — 85025 COMPLETE CBC W/AUTO DIFF WBC: CPT

## 2020-07-13 PROCEDURE — 86920 COMPATIBILITY TEST SPIN: CPT

## 2020-07-13 PROCEDURE — 86850 RBC ANTIBODY SCREEN: CPT

## 2020-07-13 PROCEDURE — 36430 TRANSFUSION BLD/BLD COMPNT: CPT

## 2020-07-13 PROCEDURE — 93750 PR INTERROGATE VENT ASSIST DEV, IN PERSON, W PHYSICIAN ANALYSIS: ICD-10-PCS | Mod: ,,, | Performed by: INTERNAL MEDICINE

## 2020-07-13 PROCEDURE — 25000003 PHARM REV CODE 250: Performed by: INTERNAL MEDICINE

## 2020-07-13 PROCEDURE — 83615 LACTATE (LD) (LDH) ENZYME: CPT

## 2020-07-13 PROCEDURE — 99900035 HC TECH TIME PER 15 MIN (STAT)

## 2020-07-13 PROCEDURE — 27000248 HC VAD-ADDITIONAL DAY

## 2020-07-13 PROCEDURE — 85730 THROMBOPLASTIN TIME PARTIAL: CPT

## 2020-07-13 PROCEDURE — 80076 HEPATIC FUNCTION PANEL: CPT

## 2020-07-13 RX ORDER — HYDROCODONE BITARTRATE AND ACETAMINOPHEN 500; 5 MG/1; MG/1
TABLET ORAL
Status: DISCONTINUED | OUTPATIENT
Start: 2020-07-13 | End: 2020-07-14 | Stop reason: HOSPADM

## 2020-07-13 RX ORDER — WARFARIN SODIUM 5 MG/1
TABLET ORAL
Qty: 30 TABLET | Refills: 11
Start: 2020-07-13 | End: 2020-11-25 | Stop reason: SDUPTHER

## 2020-07-13 RX ORDER — TORSEMIDE 20 MG/1
20 TABLET ORAL DAILY
Qty: 30 TABLET | Refills: 11 | Status: SHIPPED | OUTPATIENT
Start: 2020-07-13 | End: 2020-09-24 | Stop reason: SDUPTHER

## 2020-07-13 RX ORDER — CEFEPIME HYDROCHLORIDE 1 G/1
1 INJECTION, POWDER, FOR SOLUTION INTRAMUSCULAR; INTRAVENOUS EVERY 8 HOURS
Status: ON HOLD
Start: 2020-07-13 | End: 2020-09-14

## 2020-07-13 RX ORDER — AMIODARONE HYDROCHLORIDE 200 MG/1
200 TABLET ORAL DAILY
Qty: 30 TABLET | Refills: 11 | Status: ON HOLD
Start: 2020-07-14 | End: 2021-02-25 | Stop reason: HOSPADM

## 2020-07-13 RX ORDER — POTASSIUM CHLORIDE 20 MEQ/1
20 TABLET, EXTENDED RELEASE ORAL DAILY
Qty: 30 TABLET | Refills: 11 | Status: SHIPPED | OUTPATIENT
Start: 2020-07-13 | End: 2020-09-24 | Stop reason: SDUPTHER

## 2020-07-13 RX ORDER — GUANFACINE 2 MG/1
2 TABLET ORAL NIGHTLY
Qty: 30 TABLET | Refills: 11 | Status: SHIPPED | OUTPATIENT
Start: 2020-07-13 | End: 2021-07-23 | Stop reason: SDUPTHER

## 2020-07-13 RX ORDER — FUROSEMIDE 10 MG/ML
40 INJECTION INTRAMUSCULAR; INTRAVENOUS ONCE
Status: COMPLETED | OUTPATIENT
Start: 2020-07-13 | End: 2020-07-13

## 2020-07-13 RX ADMIN — AMLODIPINE BESYLATE 10 MG: 10 TABLET ORAL at 08:07

## 2020-07-13 RX ADMIN — PAROXETINE HYDROCHLORIDE 10 MG: 10 TABLET, FILM COATED ORAL at 08:07

## 2020-07-13 RX ADMIN — DOXAZOSIN 8 MG: 8 TABLET ORAL at 08:07

## 2020-07-13 RX ADMIN — WARFARIN SODIUM 2.5 MG: 2.5 TABLET ORAL at 06:07

## 2020-07-13 RX ADMIN — PREDNISONE 2.5 MG: 2.5 TABLET ORAL at 08:07

## 2020-07-13 RX ADMIN — FERROUS GLUCONATE TAB 324 MG (37.5 MG ELEMENTAL IRON) 324 MG: 324 (37.5 FE) TAB at 12:07

## 2020-07-13 RX ADMIN — GUANFACINE 2 MG: 1 TABLET ORAL at 09:07

## 2020-07-13 RX ADMIN — CEFEPIME 1 G: 1 INJECTION, POWDER, FOR SOLUTION INTRAMUSCULAR; INTRAVENOUS at 08:07

## 2020-07-13 RX ADMIN — AMIODARONE HYDROCHLORIDE 200 MG: 200 TABLET ORAL at 08:07

## 2020-07-13 RX ADMIN — FUROSEMIDE 40 MG: 10 INJECTION, SOLUTION INTRAMUSCULAR; INTRAVENOUS at 09:07

## 2020-07-13 RX ADMIN — ZOLPIDEM TARTRATE 10 MG: 5 TABLET ORAL at 09:07

## 2020-07-13 RX ADMIN — PANTOPRAZOLE SODIUM 40 MG: 40 TABLET, DELAYED RELEASE ORAL at 12:07

## 2020-07-13 RX ADMIN — ALLOPURINOL 100 MG: 100 TABLET ORAL at 08:07

## 2020-07-13 RX ADMIN — ACETAMINOPHEN 650 MG: 325 TABLET ORAL at 09:07

## 2020-07-13 RX ADMIN — ASPIRIN 81 MG: 81 TABLET, COATED ORAL at 08:07

## 2020-07-13 RX ADMIN — COLCHICINE 0.6 MG: 0.6 TABLET, FILM COATED ORAL at 08:07

## 2020-07-13 RX ADMIN — MELATONIN 1000 UNITS: at 08:07

## 2020-07-13 RX ADMIN — CEFEPIME 1 G: 1 INJECTION, POWDER, FOR SOLUTION INTRAMUSCULAR; INTRAVENOUS at 03:07

## 2020-07-13 RX ADMIN — DOXAZOSIN 8 MG: 8 TABLET ORAL at 09:07

## 2020-07-13 NOTE — PROGRESS NOTES
DISCHARGE    SW to pt's room for discharge today.  Pt presents as AAO x4, calm, cooperative, and asking and answering questions appropriately.  Pt reports that his procedure on Friday went well, and that he is ready to go home and get some rest in his own bed. Pt verbalizes understanding and agreement with plan for d/c to home today with HH and IV Abx.  Pt reports spouse will transport Pt home today.  Pt reports coping adequately at this time, and denies any needs or concerns to SW.    SW notified [facility] ([phone]  F: [phone]) of d/c today and faxed updated orders accordingly. SW notified [facility]  (ph: [phone]) of d/c today and verified they have Epic Access - Pt is scheduled for HH admit on Tuesday 7/14. Pt also notes a Life Vest at bedside & has no questions or concerns regarding this. SW providing ongoing psychosocial and counseling support, education, resources, assistance, and discharge planning as indicated.  SW remains available.

## 2020-07-13 NOTE — PROGRESS NOTES
[facility]  Cardiac Electrophysiology  Progress Note    Admission Date: 7/1/2020  Code Status: Full Code   Attending Physician: Amparo Lopez MD   Expected Discharge Date: 7/12/2020  Principal Problem:ICD (implantable cardioverter-defibrillator) infection    Subjective:     Interval History:   Hb 7.5  This morning, patient feeling lethargic this morning. Pressure dressing removed this morning. Small hematoma around incision site, unlikely cause of anemia. No evidence of overt bleeding.   INR 2.7    ROS  Objective:     Vital Signs (Most Recent):  Temp: 98.4 °F (36.9 °C) (07/13/20 1200)  Pulse: 75 (07/13/20 1200)  Resp: 16 (07/13/20 1200)  BP: (!) 72/0 (07/13/20 1200)  SpO2: 98 % (07/13/20 1200) Vital Signs (24h Range):  Temp:  [98 °F (36.7 °C)-98.4 °F (36.9 °C)] 98.4 °F (36.9 °C)  Pulse:  [74-95] 75  Resp:  [16-18] 16  SpO2:  [95 %-98 %] 98 %  BP: ()/(0-57) 72/0     Weight: 122.9 kg (270 lb 15.1 oz)  Body mass index is 35.75 kg/m².     SpO2: 98 %  O2 Device (Oxygen Therapy): room air    Physical Exam   Constitutional: He appears well-developed and well-nourished.   Eyes: Right eye exhibits no discharge. Left eye exhibits no discharge. No scleral icterus.   Cardiovascular: Normal rate and regular rhythm.   Groin sites without hematoma or bleeding   Pulmonary/Chest: Effort normal and breath sounds normal. No respiratory distress.   Musculoskeletal:         General: No edema.      Comments: Bandage of incision site( pressure dressing removed this AM). Small hematoma over incision. No  active bleeding noted.   Neurological: He is alert.   Nursing note and vitals reviewed.      Significant Labs:   CMP:   Recent Labs   Lab 07/12/20  0505 07/13/20  0522   * 135*   K 4.2 3.9    104   CO2 22* 25   GLU 97 80   BUN 18 13   CREATININE 1.9* 1.6*   CALCIUM 8.3* 8.6*   PROT  --  5.8*   ALBUMIN  --  2.9*   BILITOT  --  0.3   ALKPHOS  --  83   AST  --  48*   ALT  --  35   ANIONGAP 8 6*    ESTGFRAFRICA 45.5* 56.0*   EGFRNONAA 39.4* 48.5*   , CBC:   Recent Labs   Lab 07/12/20  0756 07/12/20  1343 07/13/20  0522   WBC 9.20 9.88 7.96   HGB 7.7* 7.7* 7.5*   HCT 25.7* 24.4* 24.4*    164 167   , INR:   Recent Labs   Lab 07/12/20  0505 07/13/20  0522   INR 2.9* 2.7*    and All pertinent lab results from the last 24 hours have been reviewed.    Significant Imaging: Echocardiogram:   2D echo with color flow doppler:   Results for orders placed or performed during the hospital encounter of 07/18/18   2D Echo w/ Color Flow Doppler   Result Value Ref Range    QEF 15 (A) 55 - 65    Mitral Valve Regurgitation SEVERE (A)     Est. PA Systolic Pressure 33.6     Mitral Valve Mobility NORMAL     Tricuspid Valve Regurgitation MILD     Narrative    Date of Procedure: 07/18/2018        TEST DESCRIPTION   Technical Quality: This is a technically challenging study. There is poor endocardial definition.     General: A catheter is present in the right-sided cardiac chambers.     Aorta: The aortic root is normal in size, measuring 2.1 cm at sinotubular junction and 3.4 cm at Sinuses of Valsalva. The proximal ascending aorta is normal in size, measuring 3.6 cm across.     Left Atrium: The left atrial volume index is severely enlarged, measuring 73.67 cc/m2.     Left Ventricle: The left ventricle is severely enlarged, with an end-diastolic diameter of 8.3 cm, and an end-systolic diameter of 7.7 cm. LV wall thickness is normal, with the septum and the posterior wall each measuring 0.8 cm across. Relative wall   thickness was normal at 0.19, and the LV mass index was increased at 172.0 g/m2 consistent with eccentric left ventricular hypertrophy. There is global hypokinesis. Left ventricular systolic function appears severely depressed. Visually estimated   ejection fraction is 15-20%.         Right Atrium: The right atrium is enlarged, measuring 6.8 cm in length and 5.1 cm in width in the apical view.     Right Ventricle:  The right ventricle is normal in size measuring 3.4 cm at the base in the apical right ventricle-focused view. Global right ventricular systolic function appears moderately depressed. There is abnormal septal motion. Tricuspid annular   plane systolic excursion (TAPSE) is 1.2 cm. The estimated PA systolic pressure is 34 mmHg.     Aortic Valve:  The aortic valve is normal in structure.     Mitral Valve:  The mitral valve is normal in structure with normal leaflet mobility. There is severe mitral regurgitation.     Tricuspid Valve:  The tricuspid valve is normal in structure with normal leaflet mobility. There is mild tricuspid regurgitation.     Pulmonary Valve:  The pulmonic valve is normal in structure with normal leaflet mobility. There is trivial to mild pulmonic regurgitation.     IVC: IVC is enlarged but collapses > 50% with a sniff, suggesting intermediate right atrial pressure of 8 mmHg.     Intracavitary: There is no evidence of pericardial effusion, intracavity mass, thrombi, or vegetation.     Other: There is a left pleural effusion present.       LVAD: There is a HeartMate II LVAD in place. Inflow cannula is visualized. Outflow graft is not visualized. Baseline speed is 9400 rpms. The aortic valve opens intermittently. Septum is midline.       CONCLUSIONS     1 - Severely depressed left ventricular systolic function (EF 15-20%).     2 - Eccentric hypertrophy.     3 - Biatrial enlargement.     4 - Moderately depressed right ventricular systolic function .     5 - The estimated PA systolic pressure is 34 mmHg.     6 - Severe mitral regurgitation.     7 - Mild tricuspid regurgitation.     8 - Trivial to mild pulmonic regurgitation.     9 - Intermediate central venous pressure.     10 - Left pleural effusion.     11 - HeartMate II LVAD; speed 9400.             This document has been electronically    SIGNED BY: Magi Delong MD On: 07/18/2018 17:10     Assessment and Plan:     * ICD (implantable  cardioverter-defibrillator) infection  BiV-Icd [employer] (2014 Dr. Chiu), successful DFT at 35J on 10/18/2019. Multiple VT episodes,  generator change (BiV ICD -> dual ICD),  device revision (addition of a new RV/ICD lead) and another VF induction via ICD on 3/2020. Presented from device clinic with device erosion.     - S/p device extraction on 7/10  - Lifevest in place.  - Passed screening for SUQ-ICD   - Mr. Richards should follow up with Dr. Yun 1 week after competition of antibiotic therapy (per ID recommendations).         Lana Garza MD  Cardiac Electrophysiology  [facility]

## 2020-07-13 NOTE — SUBJECTIVE & OBJECTIVE
Interval History:   Hb 7.5  This morning, patient feeling lethargic this morning. Pressure dressing removed this morning. Small hematoma around incision site, unlikely cause of anemia. No evidence of overt bleeding.   INR 2.7    ROS  Objective:     Vital Signs (Most Recent):  Temp: 98.4 °F (36.9 °C) (07/13/20 1200)  Pulse: 75 (07/13/20 1200)  Resp: 16 (07/13/20 1200)  BP: (!) 72/0 (07/13/20 1200)  SpO2: 98 % (07/13/20 1200) Vital Signs (24h Range):  Temp:  [98 °F (36.7 °C)-98.4 °F (36.9 °C)] 98.4 °F (36.9 °C)  Pulse:  [74-95] 75  Resp:  [16-18] 16  SpO2:  [95 %-98 %] 98 %  BP: ()/(0-57) 72/0     Weight: 122.9 kg (270 lb 15.1 oz)  Body mass index is 35.75 kg/m².     SpO2: 98 %  O2 Device (Oxygen Therapy): room air    Physical Exam   Constitutional: He appears well-developed and well-nourished.   Eyes: Right eye exhibits no discharge. Left eye exhibits no discharge. No scleral icterus.   Cardiovascular: Normal rate and regular rhythm.   Groin sites without hematoma or bleeding   Pulmonary/Chest: Effort normal and breath sounds normal. No respiratory distress.   Musculoskeletal:         General: No edema.      Comments: Bandage of incision site( pressure dressing removed this AM). Small hematoma over incision. No  active bleeding noted.   Neurological: He is alert.   Nursing note and vitals reviewed.      Significant Labs:   CMP:   Recent Labs   Lab 07/12/20  0505 07/13/20  0522   * 135*   K 4.2 3.9    104   CO2 22* 25   GLU 97 80   BUN 18 13   CREATININE 1.9* 1.6*   CALCIUM 8.3* 8.6*   PROT  --  5.8*   ALBUMIN  --  2.9*   BILITOT  --  0.3   ALKPHOS  --  83   AST  --  48*   ALT  --  35   ANIONGAP 8 6*   ESTGFRAFRICA 45.5* 56.0*   EGFRNONAA 39.4* 48.5*   , CBC:   Recent Labs   Lab 07/12/20  0756 07/12/20  1343 07/13/20  0522   WBC 9.20 9.88 7.96   HGB 7.7* 7.7* 7.5*   HCT 25.7* 24.4* 24.4*    164 167   , INR:   Recent Labs   Lab 07/12/20  0505 07/13/20  0522   INR 2.9* 2.7*    and All pertinent  lab results from the last 24 hours have been reviewed.    Significant Imaging: Echocardiogram:   2D echo with color flow doppler:   Results for orders placed or performed during the hospital encounter of 07/18/18   2D Echo w/ Color Flow Doppler   Result Value Ref Range    QEF 15 (A) 55 - 65    Mitral Valve Regurgitation SEVERE (A)     Est. PA Systolic Pressure 33.6     Mitral Valve Mobility NORMAL     Tricuspid Valve Regurgitation MILD     Narrative    Date of Procedure: 07/18/2018        TEST DESCRIPTION   Technical Quality: This is a technically challenging study. There is poor endocardial definition.     General: A catheter is present in the right-sided cardiac chambers.     Aorta: The aortic root is normal in size, measuring 2.1 cm at sinotubular junction and 3.4 cm at Sinuses of Valsalva. The proximal ascending aorta is normal in size, measuring 3.6 cm across.     Left Atrium: The left atrial volume index is severely enlarged, measuring 73.67 cc/m2.     Left Ventricle: The left ventricle is severely enlarged, with an end-diastolic diameter of 8.3 cm, and an end-systolic diameter of 7.7 cm. LV wall thickness is normal, with the septum and the posterior wall each measuring 0.8 cm across. Relative wall   thickness was normal at 0.19, and the LV mass index was increased at 172.0 g/m2 consistent with eccentric left ventricular hypertrophy. There is global hypokinesis. Left ventricular systolic function appears severely depressed. Visually estimated   ejection fraction is 15-20%.         Right Atrium: The right atrium is enlarged, measuring 6.8 cm in length and 5.1 cm in width in the apical view.     Right Ventricle: The right ventricle is normal in size measuring 3.4 cm at the base in the apical right ventricle-focused view. Global right ventricular systolic function appears moderately depressed. There is abnormal septal motion. Tricuspid annular   plane systolic excursion (TAPSE) is 1.2 cm. The estimated PA  systolic pressure is 34 mmHg.     Aortic Valve:  The aortic valve is normal in structure.     Mitral Valve:  The mitral valve is normal in structure with normal leaflet mobility. There is severe mitral regurgitation.     Tricuspid Valve:  The tricuspid valve is normal in structure with normal leaflet mobility. There is mild tricuspid regurgitation.     Pulmonary Valve:  The pulmonic valve is normal in structure with normal leaflet mobility. There is trivial to mild pulmonic regurgitation.     IVC: IVC is enlarged but collapses > 50% with a sniff, suggesting intermediate right atrial pressure of 8 mmHg.     Intracavitary: There is no evidence of pericardial effusion, intracavity mass, thrombi, or vegetation.     Other: There is a left pleural effusion present.       LVAD: There is a HeartMate II LVAD in place. Inflow cannula is visualized. Outflow graft is not visualized. Baseline speed is 9400 rpms. The aortic valve opens intermittently. Septum is midline.       CONCLUSIONS     1 - Severely depressed left ventricular systolic function (EF 15-20%).     2 - Eccentric hypertrophy.     3 - Biatrial enlargement.     4 - Moderately depressed right ventricular systolic function .     5 - The estimated PA systolic pressure is 34 mmHg.     6 - Severe mitral regurgitation.     7 - Mild tricuspid regurgitation.     8 - Trivial to mild pulmonic regurgitation.     9 - Intermediate central venous pressure.     10 - Left pleural effusion.     11 - HeartMate II LVAD; speed 9400.             This document has been electronically    SIGNED BY: Magi Delong MD On: 07/18/2018 17:10

## 2020-07-13 NOTE — PROGRESS NOTES
D/C 7/13/20-s/p HM2 LVAD admitted for ICD infection.   EP removed the ICD. He was hypotensive d/t bleed requiring transfusion. He was discharged once stable. Plan is cefepime x 2 weeks for E cloacae isolated from the pocket.   Warfarin d/c dose 5 mg daily x 2.5 mg Tues/Fri.  INR requested Wed.  Coumadin calendar updated per inpatient MAR.

## 2020-07-13 NOTE — ASSESSMENT & PLAN NOTE
- Not actively bleeding at this time. Hgb is 7.5. However, patient is symptomatic. Will give one unit of PRBCs prior to discharge.

## 2020-07-13 NOTE — PLAN OF CARE
Patient alert and responsive to nursing care. Vital signs WNL.  No c/o of pain or discomfort.  No cardiopulmonary distress.  No chest pain. No SOB. LVAD functioning properly, no alarms, smooth VAD hum present, VAD number WNL. Dopplers in 70s.  HR NSR with LVAD artifact.  PICC line flushed w/o difficulty and saline locked.  Dressing CDI. No s/s of infection.   Cefepime 1 g IV push, patient tolerating medication, no adverse reaction. Pressure dressing on left chest, CDI.  PIVs left arm patent and saline locked. Medication given per MD order.  Comfort and safety measure maintained.  Adjusting well since transferring from ICU. Life vest on.  Call light and bedside table within reach.  Needs met in timely matter. Will continue to monitor for changes of condition.

## 2020-07-13 NOTE — NURSING
Notified MD Zavala about pt wanting to speak with EP and questions about his care. MD stated he will see patient later    Notified VAD coordinator, Jack about pt wanting to speak with a coordinator about his care.

## 2020-07-13 NOTE — PROGRESS NOTES
[facility]  Heart Transplant  Progress Note    Patient Name: Tim Richards  MRN: [id number]  Admission Date: 7/1/2020  Hospital Length of Stay: 12 days  Attending Physician: Amparo Lopez MD  Primary Care Provider: Power Connors MD  Principal Problem:ICD (implantable cardioverter-defibrillator) infection    Subjective:     Interval History: Hgb overall stable. However, patient feeling dizzy upon standing. Also feels fatigued. No signs of bleeding.     Continuous Infusions:  Scheduled Meds:   allopurinoL  100 mg Oral Daily    amiodarone  200 mg Oral Daily    amLODIPine  10 mg Oral Daily    aspirin  81 mg Oral Daily    ceFEPime (MAXIPIME) IVPB  1 g Intravenous Q8H    colchicine  0.6 mg Oral Daily    doxazosin  8 mg Oral Q12H    ferrous gluconate  324 mg Oral Daily with lunch    fluticasone propionate  2 spray Each Nostril Daily    furosemide (LASIX) IV  40 mg Intravenous Once    guanFACINE  2 mg Oral QHS    pantoprazole  40 mg Oral Daily with lunch    paroxetine  10 mg Oral QAM    predniSONE  2.5 mg Oral Daily    vitamin D  1,000 Units Oral Daily    warfarin  2.5 mg Oral Daily     PRN Meds:sodium chloride, acetaminophen, polyethylene glycol, senna-docusate 8.6-50 mg, zolpidem    Review of patient's allergies indicates:   Allergen Reactions    Lisinopril Anaphylaxis    Hydralazine analogues      Chronic constipation, impotence, dizziness     Objective:     Vital Signs (Most Recent):  Temp: 98.4 °F (36.9 °C) (07/13/20 1200)  Pulse: 75 (07/13/20 1200)  Resp: 16 (07/13/20 1200)  BP: (!) 72/0 (07/13/20 1200)  SpO2: 98 % (07/13/20 1200) Vital Signs (24h Range):  Temp:  [98 °F (36.7 °C)-98.4 °F (36.9 °C)] 98.4 °F (36.9 °C)  Pulse:  [75-95] 75  Resp:  [16-18] 16  SpO2:  [96 %-98 %] 98 %  BP: ()/(0-57) 72/0     Patient Vitals for the past 72 hrs (Last 3 readings):   Weight   07/13/20 0800 122.9 kg (270 lb 15.1 oz)   07/11/20 0525 122.1 kg (269 lb 2.9 oz)   07/10/20 1900  121.1 kg (267 lb)     Body mass index is 35.75 kg/m².      Intake/Output Summary (Last 24 hours) at 7/13/2020 1710  Last data filed at 7/13/2020 1200  Gross per 24 hour   Intake 720 ml   Output 1175 ml   Net -455 ml       Physical Exam  Vitals signs and nursing note reviewed.   Constitutional:       Appearance: Normal appearance.   HENT:      Head: Normocephalic and atraumatic.      Nose: No congestion.      Mouth/Throat:      Mouth: Mucous membranes are moist.   Eyes:      Extraocular Movements: Extraocular movements intact.      Pupils: Pupils are equal, round, and reactive to light.   Neck:      Musculoskeletal: Normal range of motion.   Cardiovascular:      Rate and Rhythm: Normal rate and regular rhythm.      Comments: Smooth VAD hum    Left upper chest (ICD pocket) will mild swelling  Pulmonary:      Effort: Pulmonary effort is normal. No respiratory distress.      Breath sounds: Normal breath sounds. No rales.   Abdominal:      General: Abdomen is flat. Bowel sounds are normal. There is no distension.      Tenderness: There is no abdominal tenderness. There is no guarding.   Musculoskeletal:         General: No swelling.   Skin:     General: Skin is warm.   Neurological:      General: No focal deficit present.      Mental Status: He is alert.   Psychiatric:         Mood and Affect: Mood normal.         Significant Labs:  CBC:  Recent Labs   Lab 07/12/20  0756 07/12/20  1343 07/13/20  0522   WBC 9.20 9.88 7.96   RBC 2.89* 2.81* 2.67*   HGB 7.7* 7.7* 7.5*   HCT 25.7* 24.4* 24.4*    164 167   MCV 89 87 91   MCH 26.6* 27.4 28.1   MCHC 30.0* 31.6* 30.7*     BNP:  Recent Labs   Lab 07/08/20  0511 07/10/20  0535 07/13/20  0522   BNP 74 141* 297*     CMP:  Recent Labs   Lab 07/08/20  0511  07/10/20  0535  07/11/20  0832 07/12/20  0505 07/13/20  0522   GLU 84   < > 86   < > 139* 97 80   CALCIUM 9.3   < > 9.5   < > 8.5* 8.3* 8.6*   ALBUMIN 3.5  --  3.5  --   --   --  2.9*   PROT 7.4  --  7.3  --   --   --  5.8*       < > 139   < > 135* 134* 135*   K 3.7   < > 4.1   < > 5.1 4.2 3.9   CO2 24   < > 27   < > 24 22* 25      < > 105   < > 104 104 104   BUN 14   < > 12   < > 20 18 13   CREATININE 1.7*   < > 1.8*   < > 2.3* 1.9* 1.6*   ALKPHOS 94  --  93  --   --   --  83   ALT 23  --  24  --   --   --  35   AST 30  --  30  --   --   --  48*   BILITOT 0.5  --  0.4  --   --   --  0.3    < > = values in this interval not displayed.      Coagulation:   Recent Labs   Lab 07/11/20  0452 07/12/20  0505 07/13/20  0522   INR 2.5* 2.9* 2.7*   APTT 28.8 34.1* 36.6*     LDH:  Recent Labs   Lab 07/11/20  0452 07/12/20  0505 07/13/20  0522   * 425* 302*     Microbiology:  Microbiology Results (last 7 days)     Procedure Component Value Units Date/Time    Aerobic culture [[id number]] Collected: 07/10/20 1409    Order Status: Completed Specimen: Incision site from Heart Updated: 07/13/20 0840     Aerobic Bacterial Culture No growth    Narrative:      Old RV lead    Aerobic culture [[id number]] Collected: 07/10/20 1237    Order Status: Completed Specimen: Incision site from Heart Updated: 07/13/20 0840     Aerobic Bacterial Culture No growth    Narrative:      New RV lead    Aerobic culture [[id number]] Collected: 07/10/20 1433    Order Status: Completed Specimen: Incision site from Heart Updated: 07/13/20 0829     Aerobic Bacterial Culture No growth    Narrative:      RA lead    Aerobic culture [[id number]] Collected: 07/10/20 1344    Order Status: Completed Specimen: Incision site from Heart Updated: 07/13/20 0823     Aerobic Bacterial Culture No growth    Narrative:      LV lead    Culture, Anaerobe [[id number]] Collected: 07/10/20 1409    Order Status: Completed Specimen: Incision site from Heart Updated: 07/11/20 1403     Anaerobic Culture Culture in progress    Narrative:      Old RV lead    Culture, Anaerobe [[id number]] Collected: 07/10/20 1433    Order Status: Completed Specimen: Incision site from Heart Updated: 07/11/20  1403     Anaerobic Culture Culture in progress    Narrative:      RA lead    Culture, Anaerobe [[id number]] Collected: 07/10/20 1344    Order Status: Completed Specimen: Incision site from Heart Updated: 07/11/20 1403     Anaerobic Culture Culture in progress    Narrative:      LV lead    Culture, Anaerobe [[id number]] Collected: 07/10/20 1237    Order Status: Completed Specimen: Incision site from Heart Updated: 07/11/20 1403     Anaerobic Culture Culture in progress    Narrative:      New RV lead    Culture, Anaerobe [[id number]] Collected: 07/01/20 1741    Order Status: Completed Specimen: Incision site from Chest, Left Updated: 07/09/20 0906     Anaerobic Culture No anaerobes isolated    Culture, Anaerobe [[id number]] Collected: 07/01/20 1741    Order Status: Completed Specimen: Skin from Abdomen Updated: 07/09/20 0742     Anaerobic Culture No anaerobes isolated    Narrative:      Drive line site    Blood culture [[id number]] Collected: 07/03/20 0712    Order Status: Completed Specimen: Blood Updated: 07/08/20 0812     Blood Culture, Routine No growth after 5 days.    Blood culture [[id number]] Collected: 07/03/20 0711    Order Status: Completed Specimen: Blood Updated: 07/08/20 0812     Blood Culture, Routine No growth after 5 days.    Blood culture [[id number]] Collected: 07/02/20 1110    Order Status: Completed Specimen: Blood Updated: 07/07/20 1412     Blood Culture, Routine No growth after 5 days.    Culture, Anaerobe [[id number]]  (Abnormal) Collected: 07/01/20 2153    Order Status: Completed Specimen: Wound from Abdomen Updated: 07/07/20 1316     Anaerobic Culture FUSOBACTERIUM NUCLEATUM  Moderate        PREVOTELLA (B.) MELANINOGENICA  Moderate      Narrative:      Left chest incision.          I have reviewed all pertinent labs within the past 24 hours.    Estimated Creatinine Clearance: 73.3 mL/min (A) (based on SCr of 1.6 mg/dL (H)).    Diagnostic Results:  I have reviewed all pertinent imaging  results/findings within the past 24 hours.    Assessment and Plan:     53 yo AAM with DCM, HBP, CHF stage D s/p HM 2, ICD lead replacement 03/09/2020, hyperthyroidism,  sent in from clinic with a ICD pocket site infection. Patient wife reports yellow pus oozing from site this past week. Was prescribed antibiotics on Monday 6/29/20, and advised to come get admitted that day, but patient reported he didn't think it was necessary at that time. Denies fever, but reports intermittent nausea and chills, and Left side of Left hand tingling.      VAD parameters at baseline.  Pt speed decreased to 9800 rpms from 10,000 on 06/12/2020    ICD device check - 07/01/2020 --. Device interrogation revealed HV x 1 on 6/27/20 @ 3:41 pm for MMVT, Type 2 break.,CL-188 bpm. Patient reported sitting in car bending over when it occurred, not doing anything strenuous. Patient denies LOC. Patient reports overall feeling palpitations, tired, no energy, and COLEMAN, even prior to HV therapy.          * ICD (implantable cardioverter-defibrillator) infection  - presented with device pocket infection  - has been on oral doxycycline at home for the last 1 week with no improvement  - denies any fever but does admit to fatigue and chills   - labs pending   - ICD device check - 07/01/2020 --. Device interrogation revealed HV x 1 on 6/27/20 @ 3:41 pm for MMVT, Type 2 break.,CL-188 bpm.  - Continue cefepime 2 wks after device extraction (7/24). Follow up with Dr Yun 1 week after completion of IV abx to discuss SQ ICD  - Wearing LifeVest    Acute blood loss anemia  - Not actively bleeding at this time. Hgb is 7.5. However, patient is symptomatic. Will give one unit of PRBCs prior to discharge.    Gout  - Uric acid  - S/W colchicine and allopurinol continued    VT (ventricular tachycardia)  - device check showed a VT episode with shock on 06/27  - patient denies any LOC   - c/w amiodarone 200 mg daily.   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 hence the dose was lowered    LVAD (left ventricular assist device) present  - antiplatelets -aspirin 81  - anticoagulation - continue warfarin per home regimen    Procedure: Device Interrogation Including analysis of device parameters  Current Settings: Ventricular Assist Device  Review of device function is stable    TXP LVAD INTERROGATIONS 7/13/2020 7/13/2020 7/13/2020 7/13/2020 7/12/2020 7/12/2020 7/12/2020   Type HeartMate II HeartMate II HeartMate II HeartMate II HeartMate II HeartMate II HeartMate II   Flow 6.4 5.7 5.8 5.8 5.8 6 6   Speed 9800 9800 9800 9800 9800 9800 9800   PI 2.2 2.9 3.2 3.0 3.0 3.4 2.5   Power (Jackson) 6.9 6.7 6.6 6.6 6.6 6.7 6.8   LSL 9400 9400 9400 9400 9400 - -   Pulsatility Intermittent pulse Intermittent pulse Intermittent pulse Intermittent pulse Intermittent pulse Intermittent pulse No Pulse           Sanya Zavala MD  Heart Transplant  [facility]

## 2020-07-13 NOTE — SUBJECTIVE & OBJECTIVE
Interval History: Hgb overall stable. However, patient feeling dizzy upon standing. Also feels fatigued. No signs of bleeding.     Continuous Infusions:  Scheduled Meds:   allopurinoL  100 mg Oral Daily    amiodarone  200 mg Oral Daily    amLODIPine  10 mg Oral Daily    aspirin  81 mg Oral Daily    ceFEPime (MAXIPIME) IVPB  1 g Intravenous Q8H    colchicine  0.6 mg Oral Daily    doxazosin  8 mg Oral Q12H    ferrous gluconate  324 mg Oral Daily with lunch    fluticasone propionate  2 spray Each Nostril Daily    furosemide (LASIX) IV  40 mg Intravenous Once    guanFACINE  2 mg Oral QHS    pantoprazole  40 mg Oral Daily with lunch    paroxetine  10 mg Oral QAM    predniSONE  2.5 mg Oral Daily    vitamin D  1,000 Units Oral Daily    warfarin  2.5 mg Oral Daily     PRN Meds:sodium chloride, acetaminophen, polyethylene glycol, senna-docusate 8.6-50 mg, zolpidem    Review of patient's allergies indicates:   Allergen Reactions    Lisinopril Anaphylaxis    Hydralazine analogues      Chronic constipation, impotence, dizziness     Objective:     Vital Signs (Most Recent):  Temp: 98.4 °F (36.9 °C) (07/13/20 1200)  Pulse: 75 (07/13/20 1200)  Resp: 16 (07/13/20 1200)  BP: (!) 72/0 (07/13/20 1200)  SpO2: 98 % (07/13/20 1200) Vital Signs (24h Range):  Temp:  [98 °F (36.7 °C)-98.4 °F (36.9 °C)] 98.4 °F (36.9 °C)  Pulse:  [75-95] 75  Resp:  [16-18] 16  SpO2:  [96 %-98 %] 98 %  BP: ()/(0-57) 72/0     Patient Vitals for the past 72 hrs (Last 3 readings):   Weight   07/13/20 0800 122.9 kg (270 lb 15.1 oz)   07/11/20 0525 122.1 kg (269 lb 2.9 oz)   07/10/20 1900 121.1 kg (267 lb)     Body mass index is 35.75 kg/m².      Intake/Output Summary (Last 24 hours) at 7/13/2020 1710  Last data filed at 7/13/2020 1200  Gross per 24 hour   Intake 720 ml   Output 1175 ml   Net -455 ml       Physical Exam  Vitals signs and nursing note reviewed.   Constitutional:       Appearance: Normal appearance.   HENT:      Head:  Normocephalic and atraumatic.      Nose: No congestion.      Mouth/Throat:      Mouth: Mucous membranes are moist.   Eyes:      Extraocular Movements: Extraocular movements intact.      Pupils: Pupils are equal, round, and reactive to light.   Neck:      Musculoskeletal: Normal range of motion.   Cardiovascular:      Rate and Rhythm: Normal rate and regular rhythm.      Comments: Smooth VAD hum    Left upper chest (ICD pocket) will mild swelling  Pulmonary:      Effort: Pulmonary effort is normal. No respiratory distress.      Breath sounds: Normal breath sounds. No rales.   Abdominal:      General: Abdomen is flat. Bowel sounds are normal. There is no distension.      Tenderness: There is no abdominal tenderness. There is no guarding.   Musculoskeletal:         General: No swelling.   Skin:     General: Skin is warm.   Neurological:      General: No focal deficit present.      Mental Status: He is alert.   Psychiatric:         Mood and Affect: Mood normal.         Significant Labs:  CBC:  Recent Labs   Lab 07/12/20  0756 07/12/20  1343 07/13/20  0522   WBC 9.20 9.88 7.96   RBC 2.89* 2.81* 2.67*   HGB 7.7* 7.7* 7.5*   HCT 25.7* 24.4* 24.4*    164 167   MCV 89 87 91   MCH 26.6* 27.4 28.1   MCHC 30.0* 31.6* 30.7*     BNP:  Recent Labs   Lab 07/08/20  0511 07/10/20  0535 07/13/20  0522   BNP 74 141* 297*     CMP:  Recent Labs   Lab 07/08/20  0511  07/10/20  0535  07/11/20  0832 07/12/20  0505 07/13/20  0522   GLU 84   < > 86   < > 139* 97 80   CALCIUM 9.3   < > 9.5   < > 8.5* 8.3* 8.6*   ALBUMIN 3.5  --  3.5  --   --   --  2.9*   PROT 7.4  --  7.3  --   --   --  5.8*      < > 139   < > 135* 134* 135*   K 3.7   < > 4.1   < > 5.1 4.2 3.9   CO2 24   < > 27   < > 24 22* 25      < > 105   < > 104 104 104   BUN 14   < > 12   < > 20 18 13   CREATININE 1.7*   < > 1.8*   < > 2.3* 1.9* 1.6*   ALKPHOS 94  --  93  --   --   --  83   ALT 23  --  24  --   --   --  35   AST 30  --  30  --   --   --  48*   BILITOT  0.5  --  0.4  --   --   --  0.3    < > = values in this interval not displayed.      Coagulation:   Recent Labs   Lab 07/11/20 0452 07/12/20  0505 07/13/20 0522   INR 2.5* 2.9* 2.7*   APTT 28.8 34.1* 36.6*     LDH:  Recent Labs   Lab 07/11/20 0452 07/12/20  0505 07/13/20 0522   * 425* 302*     Microbiology:  Microbiology Results (last 7 days)     Procedure Component Value Units Date/Time    Aerobic culture [[id number]] Collected: 07/10/20 1409    Order Status: Completed Specimen: Incision site from Heart Updated: 07/13/20 0840     Aerobic Bacterial Culture No growth    Narrative:      Old RV lead    Aerobic culture [[id number]] Collected: 07/10/20 1237    Order Status: Completed Specimen: Incision site from Heart Updated: 07/13/20 0840     Aerobic Bacterial Culture No growth    Narrative:      New RV lead    Aerobic culture [[id number]] Collected: 07/10/20 1433    Order Status: Completed Specimen: Incision site from Heart Updated: 07/13/20 0829     Aerobic Bacterial Culture No growth    Narrative:      RA lead    Aerobic culture [[id number]] Collected: 07/10/20 1344    Order Status: Completed Specimen: Incision site from Heart Updated: 07/13/20 0823     Aerobic Bacterial Culture No growth    Narrative:      LV lead    Culture, Anaerobe [[id number]] Collected: 07/10/20 1409    Order Status: Completed Specimen: Incision site from Heart Updated: 07/11/20 1403     Anaerobic Culture Culture in progress    Narrative:      Old RV lead    Culture, Anaerobe [[id number]] Collected: 07/10/20 1433    Order Status: Completed Specimen: Incision site from Heart Updated: 07/11/20 1403     Anaerobic Culture Culture in progress    Narrative:      RA lead    Culture, Anaerobe [[id number]] Collected: 07/10/20 1344    Order Status: Completed Specimen: Incision site from Heart Updated: 07/11/20 1403     Anaerobic Culture Culture in progress    Narrative:      LV lead    Culture, Anaerobe [[id number]] Collected: 07/10/20 1237    Order  Status: Completed Specimen: Incision site from Heart Updated: 07/11/20 1403     Anaerobic Culture Culture in progress    Narrative:      New RV lead    Culture, Anaerobe [[id number]] Collected: 07/01/20 1741    Order Status: Completed Specimen: Incision site from Chest, Left Updated: 07/09/20 0906     Anaerobic Culture No anaerobes isolated    Culture, Anaerobe [[id number]] Collected: 07/01/20 1741    Order Status: Completed Specimen: Skin from Abdomen Updated: 07/09/20 0742     Anaerobic Culture No anaerobes isolated    Narrative:      Drive line site    Blood culture [[id number]] Collected: 07/03/20 0712    Order Status: Completed Specimen: Blood Updated: 07/08/20 0812     Blood Culture, Routine No growth after 5 days.    Blood culture [[id number]] Collected: 07/03/20 0711    Order Status: Completed Specimen: Blood Updated: 07/08/20 0812     Blood Culture, Routine No growth after 5 days.    Blood culture [[id number]] Collected: 07/02/20 1110    Order Status: Completed Specimen: Blood Updated: 07/07/20 1412     Blood Culture, Routine No growth after 5 days.    Culture, Anaerobe [[id number]]  (Abnormal) Collected: 07/01/20 2153    Order Status: Completed Specimen: Wound from Abdomen Updated: 07/07/20 1316     Anaerobic Culture FUSOBACTERIUM NUCLEATUM  Moderate        PREVOTELLA (B.) MELANINOGENICA  Moderate      Narrative:      Left chest incision.          I have reviewed all pertinent labs within the past 24 hours.    Estimated Creatinine Clearance: 73.3 mL/min (A) (based on SCr of 1.6 mg/dL (H)).    Diagnostic Results:  I have reviewed all pertinent imaging results/findings within the past 24 hours.

## 2020-07-13 NOTE — PROGRESS NOTES
DISCHARGE NOTE:    Tim Richards is a 54 y.o. male s/p HM2 LVAD admitted for ICD infection.     Past Medical History:   Diagnosis Date    Anticoagulant long-term use     CHF (congestive heart failure)     Dilated cardiomyopathy 1/10/2018    Disorder of kidney and ureter     CKD    Encounter for blood transfusion     Gout     HTN (hypertension)     Thyroid disease     Ventricular tachycardia (paroxysmal)        Hospital Course: EP removed the ICD. He was hypotensive d/t bleed requiring transfusion. He was discharged once stable. Plan is cefepime x 2 weeks for E cloacae isolated from the pocket.   Allergies:   Review of patient's allergies indicates:   Allergen Reactions    Lisinopril Anaphylaxis    Hydralazine analogues      Chronic constipation, impotence, dizziness       Patient Pharmacy: Local preferred     Pharmacy Interventions/Recommendations:  1) INR Goal: 2-3     2) Antiplatelet Agents: ASA 81mg/day    3) Heparin Bridging:  no    4) Patient Counseling/Education:  Provided at bedside     5) INR Follow-Up/Discharge Needs:  Repeat INR Wednesday.     See list of discharge medication for dosing instructions.     Tim Richards and his caregiver verbalized their understanding and had the opportunity to ask questions.      Discharge Medications:   Tim Richards   Home Medication Instructions TRE:[id number]    Printed on:07/13/20 1234   Medication Information                      allopurinol (ZYLOPRIM) 100 MG tablet  TAKE 1 TABLET BY MOUTH EVERY DAY             amiodarone (PACERONE) 200 MG Tab  Take 1 tablet (200 mg total) by mouth once daily.             amLODIPine (NORVASC) 10 MG tablet  Take 1 tablet (10 mg total) by mouth once daily.             aspirin (ECOTRIN) 81 MG EC tablet  Take 1 tablet (81 mg total) by mouth once daily.             ceFEPIme (MAXIPIME) 1 gram injection  Inject 1 g into the vein every 8 (eight) hours.             doxazosin (CARDURA) 8 MG Tab  Take 1 tablet (8 mg  total) by mouth every 12 (twelve) hours.             ferrous gluconate 324 mg (37.5 mg iron) Tab tablet  Take 1 tablet (324 mg total) by mouth daily with breakfast.             fluticasone propionate (FLONASE) 50 mcg/actuation nasal spray  2 sprays (100 mcg total) by Each Nostril route once daily.             guanFACINE (TENEX) 2 MG tablet  Take 1 tablet (2 mg total) by mouth every evening.             pantoprazole (PROTONIX) 40 MG tablet  TAKE 1 TABLET (40 MG TOTAL) BY MOUTH ONCE DAILY.             paroxetine (PAXIL) 10 MG tablet  Take 1 tablet (10 mg total) by mouth every morning.             potassium chloride SA (K-DUR,KLOR-CON) 20 MEQ tablet  Take 1 tablet (20 mEq total) by mouth once daily.             sildenafiL (VIAGRA) 100 MG tablet  Take 1 tablet (100 mg total) by mouth daily as needed for Erectile Dysfunction.             torsemide (DEMADEX) 20 MG Tab  Take 1 tablet (20 mg total) by mouth once daily.             vitamin D (VITAMIN D3) 1000 units Tab  Take 1,000 Units by mouth once daily.             warfarin (COUMADIN) 5 MG tablet  Take 5mg orally daily except 2.5mg orally on Tuesdays / Fridays             zolpidem (AMBIEN) 10 mg Tab  Take 1 tablet (10 mg total) by mouth nightly as needed.

## 2020-07-13 NOTE — PROGRESS NOTES
07/13/2020  Ayesha Garay    Current provider:  Amparo Lopez MD      I, Ayesha Garay, rounded on Tim Richards to ensure all mechanical assist device settings (IABP or VAD) were appropriate and all parameters were within limits.  I was able to ensure all back up equipment was present, the staff had no issues, and the Perfusion Department daily rounding was complete.    11:06 AM

## 2020-07-13 NOTE — PROGRESS NOTES
07/12/2020  Kole Mendoza    Current provider:  Amparo Lopez MD      I, Kole Mendoza, rounded on Tim Richards to ensure all mechanical assist device settings (IABP or VAD) were appropriate and all parameters were within limits.  I was able to ensure all back up equipment was present, the staff had no issues, and the Perfusion Department daily rounding was complete.    10:42 PM

## 2020-07-13 NOTE — ASSESSMENT & PLAN NOTE
- antiplatelets -aspirin 81  - anticoagulation - continue warfarin per home regimen    Procedure: Device Interrogation Including analysis of device parameters  Current Settings: Ventricular Assist Device  Review of device function is stable    TXP LVAD INTERROGATIONS 7/13/2020 7/13/2020 7/13/2020 7/13/2020 7/12/2020 7/12/2020 7/12/2020   Type HeartMate II HeartMate II HeartMate II HeartMate II HeartMate II HeartMate II HeartMate II   Flow 6.4 5.7 5.8 5.8 5.8 6 6   Speed 9800 9800 9800 9800 9800 9800 9800   PI 2.2 2.9 3.2 3.0 3.0 3.4 2.5   Power (Jackson) 6.9 6.7 6.6 6.6 6.6 6.7 6.8   LSL 9400 9400 9400 9400 9400 - -   Pulsatility Intermittent pulse Intermittent pulse Intermittent pulse Intermittent pulse Intermittent pulse Intermittent pulse No Pulse

## 2020-07-13 NOTE — ASSESSMENT & PLAN NOTE
BiV-Icd Med[employer]onic (2014 Dr. Chiu), successful DFT at 35J on 10/18/2019. Multiple VT episodes,  generator change (BiV ICD -> dual ICD),  device revision (addition of a new RV/ICD lead) and another VF induction via ICD on 3/2020. Presented from device clinic with device erosion.   - S/p device extraction on 7/10  - Continue pressure dressing over the weekend  - Lifevest in place.  - Approved for SUQ-ICD   - Mr. Richards should follow up with Dr. Yun 1 week after competition of antibiotic therapy (per ID recommendations).

## 2020-07-13 NOTE — PLAN OF CARE
Plan of care discussed with patient.  Patient ambulating independently, fall precautions in place. LVAD DP and numbers WNL, smooth LVAD hum. Patient has no complaints of pain. IV antibiotics per order. 1U PRBCs infusing, pt to be discharged once completed and pt has tolerated well. Discussed medications and care.  Patient has no questions at this time. Will continue to monitor.

## 2020-07-14 ENCOUNTER — PATIENT OUTREACH (OUTPATIENT)
Dept: ADMINISTRATIVE | Facility: CLINIC | Age: 55
End: 2020-07-14

## 2020-07-14 NOTE — HOSPITAL COURSE
Patient was admitted for ICD pocket infection on 7/1. Wound cultures grew Fusobacterium nucleatum and Prevotella (B.) Melaninogenica. He was treated with IV antibiotics. Underwent device extraction on 7/10 by Dr Yun. Had significant bleeding during procedure requiring transfusion of 2 unit of PRBCs. Also hypotensive after procedure requiring IVF and . He was observed in ICU overnight and transferred back to regular floor the following day. On 7/13 his Hgb was 7.5 and he reported dizziness and fatigue. He was transfused one unit of PRBCs for symptomatic anemia. Discharged home on stable condition, on IV cefepime and LifeVest. Plan to follow up with Dr Yun 1 week after completion of IV abx (will complete of 7/24) for subQ ICD.

## 2020-07-14 NOTE — PROGRESS NOTES
Please forward this important TCC information to your provider in order to maximize the post discharge care delivery of this patient.    C3 nurse spoke with Tim Richards ( wife)  for a TCC post hospital discharge follow up call. The patient does not have a scheduled HOSFU appointment with Power Connors MD within 7-14 days post hospital discharge date 07/13/2020 C3 nurse was unable to schedule HOSFU appointment in [facility].  Please contact pcp  and schedule follow up appointment using HOSFU visit type on or before 07/27/2020    Respectfully,  Barbara Arita LPN    [facility]    [email or web address]       Please do not reply to this message, as this inbox is not routinely monitored.

## 2020-07-14 NOTE — NURSING
AAOX4, Independent. NAD VSS. 1U PRBC Transfusion complete. No complications. IV Lasix Admin per order. Tylenol admin for pain around ICD site on LCW. No drainage at ICS site, dressing intact. LVAD sounds smooth set at 9800. No significant alarms in LVAD history. Skin intact. PICC dressing intact. Discharge after post transfusion monitoring. POC discussed with pt. Wife at bedside. Will continue to monitor.

## 2020-07-14 NOTE — DISCHARGE INSTRUCTIONS
Monitor groin incision sites and ICD incision site on left chest for swelling, bleeding, tenderness, or warmth. Call doctor if any of this were to occur.

## 2020-07-14 NOTE — DISCHARGE SUMMARY
[facility]  Heart Transplant  Discharge Summary      Patient Name: Tim Richards  MRN: [id number]  Admission Date: 7/1/2020  Hospital Length of Stay: 12 days  Discharge Date: 07/13/2020   Attending Physician: Bettye Connors MD   Discharging Provider: Sanya Zavala MD  Primary Care Provider: Power Connors MD     HPI: 53 yo AAM with DCM, HBP, CHF stage D s/p HM 2, ICD lead replacement 03/09/2020, hyperthyroidism,  sent in from clinic with a ICD pocket site infection. Patient wife reports yellow pus oozing from site this past week. Was prescribed antibiotics on Monday 6/29/20, and advised to come get admitted that day, but patient reported he didn't think it was necessary at that time. Denies fever, but reports intermittent nausea and chills, and Left side of Left hand tingling.      VAD parameters at baseline.  Pt speed decreased to 9800 rpms from 10,000 on 06/12/2020    ICD device check - 07/01/2020 --. Device interrogation revealed HV x 1 on 6/27/20 @ 3:41 pm for MMVT, Type 2 break.,CL-188 bpm. Patient reported sitting in car bending over when it occurred, not doing anything strenuous. Patient denies LOC. Patient reports overall feeling palpitations, tired, no energy, and COLEMAN, even prior to HV therapy.          Procedure(s) (LRB):  EXTRACTION, ELECTRODE LEAD (N/A)  ECHOCARDIOGRAM,TRANSESOPHAGEAL (N/A)     Hospital Course: Patient was admitted for ICD pocket infection on 7/1. Wound cultures grew Fusobacterium nucleatum and Prevotella (B.) Melaninogenica. He was treated with IV antibiotics. Underwent device extraction on 7/10 by Dr Yun. Had significant bleeding during procedure requiring transfusion of 2 unit of PRBCs. Also hypotensive after procedure requiring IVF and . He was observed in ICU overnight and transferred back to regular floor the following day. On 7/13 his Hgb was 7.5 and he reported dizziness and fatigue. He was transfused one unit of PRBCs for symptomatic anemia.  Discharged home on stable condition, on IV cefepime and LifeVest. Plan to follow up with Dr Yun 1 week after completion of IV abx (will complete of 7/24) for subQ ICD.     Consults (From admission, onward)        Status Ordering Provider     Inpatient consult to Cardiothoracic Surgery  Once     Provider:  (Not yet assigned)    Completed ERICK COOPER     Inpatient consult to Endocrinology  Once     Provider:  (Not yet assigned)    Completed TREVIN LOFTON     Inpatient consult to Infectious Diseases  Once     Provider:  (Not yet assigned)    Completed TIARA CORTES     Inpatient consult to Midline team  Once     Provider:  (Not yet assigned)    Completed DEEPA WYATT JR     Inpatient consult to PICC team ([facility])  Once     Provider:  (Not yet assigned)    Completed TREVIN LOFTON     Inpatient consult to Registered Dietitian/Nutritionist  Once     Provider:  (Not yet assigned)    Completed TIARA CORTES          Significant Diagnostic Studies:     Pending Diagnostic Studies:     None        Final Active Diagnoses:    Diagnosis Date Noted POA    PRINCIPAL PROBLEM:  ICD (implantable cardioverter-defibrillator) infection [T82.7XXA] 07/01/2020 Unknown    NICM (nonischemic cardiomyopathy) [I42.8]  Yes     Chronic    Acute blood loss anemia [D62]  No    Hypotension due to hypovolemia [I95.89, E86.1]  No    Gout [M10.9] 07/06/2020 Yes    Other specified hypothyroidism [E03.8] 07/03/2020 Unknown    Infection involving implantable cardioverter-defibrillator (ICD) [T82.7XXA] 07/01/2020 Yes    VT (ventricular tachycardia) [I47.2] 10/12/2019 Yes     Chronic    Anticoagulation monitoring, INR range 2-3 [Z79.01] 03/27/2018 Not Applicable    LVAD (left ventricular assist device) present [Z95.811] 03/08/2018 Not Applicable     Chronic      Problems Resolved During this Admission:    Diagnosis Date Noted Date Resolved POA    Elevated LDH [R74.0] 07/01/2020 07/11/2020 Yes      Discharged Condition:  stable    Disposition: Home or Self Care    Follow Up:  Follow-up Information     Harry Yun MD In 2 weeks.    Specialties: Electrophysiology, Cardiology  Contact information:  Marcia Blackman  [facility] [id number] [phone]                 Patient Instructions:   No discharge procedures on file.  Medications:  Reconciled Home Medications:      Medication List      START taking these medications    ceFEPIme 1 gram injection  Commonly known as: MAXIPIME  Inject 1 g into the vein every 8 (eight) hours.        CHANGE how you take these medications    allopurinoL 100 MG tablet  Commonly known as: ZYLOPRIM  TAKE 1 TABLET BY MOUTH EVERY DAY  What changed: when to take this     amiodarone 200 MG Tab  Commonly known as: PACERONE  Take 1 tablet (200 mg total) by mouth once daily.  What changed:   · medication strength  · how much to take     ferrous gluconate 324 mg (37.5 mg iron) Tab tablet  Take 1 tablet (324 mg total) by mouth daily with breakfast.  What changed: when to take this     guanFACINE 2 MG tablet  Commonly known as: TENEX  Take 1 tablet (2 mg total) by mouth every evening.  What changed:   · medication strength  · how much to take     pantoprazole 40 MG tablet  Commonly known as: PROTONIX  TAKE 1 TABLET (40 MG TOTAL) BY MOUTH ONCE DAILY.  What changed: when to take this     potassium chloride SA 20 MEQ tablet  Commonly known as: K-DUR,KLOR-CON  Take 1 tablet (20 mEq total) by mouth once daily.  What changed: when to take this     torsemide 20 MG Tab  Commonly known as: DEMADEX  Take 1 tablet (20 mg total) by mouth once daily.  What changed:   · how much to take  · how to take this  · when to take this  · additional instructions     warfarin 5 MG tablet  Commonly known as: COUMADIN  Take 5mg orally daily except 2.5mg orally on Tuesdays / Fridays  What changed: additional instructions        CONTINUE taking these medications    amLODIPine 10 MG tablet  Commonly known as: NORVASC  Take 1 tablet (10 mg  total) by mouth once daily.     aspirin 81 MG EC tablet  Commonly known as: ECOTRIN  Take 1 tablet (81 mg total) by mouth once daily.     doxazosin 8 MG Tab  Commonly known as: CARDURA  Take 1 tablet (8 mg total) by mouth every 12 (twelve) hours.     fluticasone propionate 50 mcg/actuation nasal spray  Commonly known as: FLONASE  2 sprays (100 mcg total) by Each Nostril route once daily.     paroxetine 10 MG tablet  Commonly known as: PAXIL  Take 1 tablet (10 mg total) by mouth every morning.     sildenafiL 100 MG tablet  Commonly known as: VIAGRA  Take 1 tablet (100 mg total) by mouth daily as needed for Erectile Dysfunction.     vitamin D 1000 units Tab  Commonly known as: VITAMIN D3  Take 1,000 Units by mouth once daily.     zolpidem 10 mg Tab  Commonly known as: AMBIEN  Take 1 tablet (10 mg total) by mouth nightly as needed.        STOP taking these medications    doxycycline 100 MG Cap  Commonly known as: VIBRAMYCIN     predniSONE 2.5 MG tablet  Commonly known as: DELTASONE     spironolactone 25 MG tablet  Commonly known as: ALDACTONE            Sanya Zavala MD  Heart Transplant  [facility]

## 2020-07-14 NOTE — PATIENT INSTRUCTIONS
Home Infusion Therapy: Preventing Infection  Infusion therapy is a safe way to take medicines or fluids that cant be taken by mouth. A nurse will show you how to set up your home infusion system. Treatment will become part of your daily routine. To prevent infection, wash your hands before and after treatment. Also, keep your setup area clean and check the infusion site daily.    With infusion therapy, medicines or fluids flow through a flexible tube (catheter) thats placed in a vein or just under the skin--usually on your arm or chest (infusion site).   Washing your hands  Tips for thorough hand-washing:  · Use liquid soap and lather well for 1 to 2 minutes.  · Scrub between your fingers and under your fingernails.  · Rinse with warm water, keeping your fingers pointed down.  · Use a paper towel to dry your hands and to turn off the faucet.  Setting up  Tips for setting up:  · Wipe off the work surface. Then cover it with a clean towel. Use the same setup area each time.  · Lay out everything you need.  Caring for the infusion site  Tips for infusion site care:  · Follow the nurses directions for cleaning the infusion site and flushing the catheter before and after treatment.  · Check the site daily for signs of infection, soreness or redness at the catheter site or fever, and report these signs to your healthcare provider.  · Keep the site dry. Ask the nurse about bathing.  Date Last Reviewed: 1/11/2016  © 3876-6488 [employer]. [address] [place] [address], [place] [address]. All rights reserved. This information is not intended as a substitute for professional medical care. Always follow your healthcare professional's instructions.

## 2020-07-17 ENCOUNTER — LAB VISIT (OUTPATIENT)
Dept: LAB | Facility: HOSPITAL | Age: 55
End: 2020-07-17
Payer: COMMERCIAL

## 2020-07-17 ENCOUNTER — PATIENT MESSAGE (OUTPATIENT)
Dept: TRANSPLANT | Facility: CLINIC | Age: 55
End: 2020-07-17

## 2020-07-17 ENCOUNTER — ANTI-COAG VISIT (OUTPATIENT)
Dept: CARDIOLOGY | Facility: CLINIC | Age: 55
End: 2020-07-17
Payer: COMMERCIAL

## 2020-07-17 ENCOUNTER — TELEPHONE (OUTPATIENT)
Dept: ENDOCRINOLOGY | Facility: CLINIC | Age: 55
End: 2020-07-17

## 2020-07-17 ENCOUNTER — DOCUMENTATION ONLY (OUTPATIENT)
Dept: ELECTROPHYSIOLOGY | Facility: CLINIC | Age: 55
End: 2020-07-17

## 2020-07-17 ENCOUNTER — CLINICAL SUPPORT (OUTPATIENT)
Dept: CARDIOLOGY | Facility: HOSPITAL | Age: 55
End: 2020-07-17
Attending: INTERNAL MEDICINE
Payer: COMMERCIAL

## 2020-07-17 DIAGNOSIS — E03.2 HYPOTHYROIDISM DUE TO AMIODARONE: Primary | ICD-10-CM

## 2020-07-17 DIAGNOSIS — I50.9 HEART FAILURE: ICD-10-CM

## 2020-07-17 DIAGNOSIS — T46.2X1A HYPOTHYROIDISM DUE TO AMIODARONE: Primary | ICD-10-CM

## 2020-07-17 DIAGNOSIS — E03.2 HYPOTHYROIDISM DUE TO DRUGS: ICD-10-CM

## 2020-07-17 DIAGNOSIS — I49.01 VF (VENTRICULAR FIBRILLATION): ICD-10-CM

## 2020-07-17 DIAGNOSIS — Z95.811 LVAD (LEFT VENTRICULAR ASSIST DEVICE) PRESENT: ICD-10-CM

## 2020-07-17 DIAGNOSIS — Z95.811 LVAD (LEFT VENTRICULAR ASSIST DEVICE) PRESENT: Chronic | ICD-10-CM

## 2020-07-17 DIAGNOSIS — Z79.01 ANTICOAGULATION MONITORING, INR RANGE 2-3: ICD-10-CM

## 2020-07-17 DIAGNOSIS — Z95.811 HEART REPLACED BY HEART ASSIST DEVICE: ICD-10-CM

## 2020-07-17 LAB
BACTERIA SPEC ANAEROBE CULT: NORMAL
BASOPHILS # BLD AUTO: 0.04 K/UL (ref 0–0.2)
BASOPHILS NFR BLD: 0.5 % (ref 0–1.9)
DIFFERENTIAL METHOD: ABNORMAL
EOSINOPHIL # BLD AUTO: 0.1 K/UL (ref 0–0.5)
EOSINOPHIL NFR BLD: 1.6 % (ref 0–8)
ERYTHROCYTE [DISTWIDTH] IN BLOOD BY AUTOMATED COUNT: 16.3 % (ref 11.5–14.5)
HCT VFR BLD AUTO: 30.5 % (ref 40–54)
HGB BLD-MCNC: 9.6 G/DL (ref 14–18)
IMM GRANULOCYTES # BLD AUTO: 0.07 K/UL (ref 0–0.04)
IMM GRANULOCYTES NFR BLD AUTO: 1 % (ref 0–0.5)
INR PPP: 1.2 (ref 0.8–1.2)
LYMPHOCYTES # BLD AUTO: 0.9 K/UL (ref 1–4.8)
LYMPHOCYTES NFR BLD: 12.5 % (ref 18–48)
MCH RBC QN AUTO: 27.9 PG (ref 27–31)
MCHC RBC AUTO-ENTMCNC: 31.5 G/DL (ref 32–36)
MCV RBC AUTO: 89 FL (ref 82–98)
MONOCYTES # BLD AUTO: 0.8 K/UL (ref 0.3–1)
MONOCYTES NFR BLD: 11.4 % (ref 4–15)
NEUTROPHILS # BLD AUTO: 5.3 K/UL (ref 1.8–7.7)
NEUTROPHILS NFR BLD: 73 % (ref 38–73)
NRBC BLD-RTO: 0 /100 WBC
PLATELET # BLD AUTO: 278 K/UL (ref 150–350)
PMV BLD AUTO: 10.6 FL (ref 9.2–12.9)
PROTHROMBIN TIME: 12.5 SEC (ref 9–12.5)
RBC # BLD AUTO: 3.44 M/UL (ref 4.6–6.2)
WBC # BLD AUTO: 7.29 K/UL (ref 3.9–12.7)

## 2020-07-17 PROCEDURE — 85025 COMPLETE CBC W/AUTO DIFF WBC: CPT

## 2020-07-17 PROCEDURE — 93793 ANTICOAG MGMT PT WARFARIN: CPT | Mod: S$GLB,,,

## 2020-07-17 PROCEDURE — 36415 COLL VENOUS BLD VENIPUNCTURE: CPT

## 2020-07-17 PROCEDURE — 85610 PROTHROMBIN TIME: CPT

## 2020-07-17 PROCEDURE — 93793 PR ANTICOAGULANT MGMT FOR PT TAKING WARFARIN: ICD-10-PCS | Mod: S$GLB,,,

## 2020-07-17 RX ORDER — LEVOTHYROXINE SODIUM 50 UG/1
50 TABLET ORAL
Qty: 90 TABLET | Refills: 4 | Status: SHIPPED | OUTPATIENT
Start: 2020-07-17 | End: 2020-09-24 | Stop reason: SDUPTHER

## 2020-07-17 NOTE — PROGRESS NOTES
Pt here for wound check s/p extraction. Pt has a LVAD  Removed dressing and old dark blood noted. Pt states started accumulating on dressing two days ago. Cleansed site and pressed on incision, no new blood cam out of site. There is an area where the blood is escaping from incision. Incision shows no s/s of infection, hematoma underneath. On Coumadin. Denies pain.    Reviewed picture with . Using sterile tech, applied steristrips after cleansing site and covered with Mepilex dressing. Pt is to keep dressing on 4 days, remove it and cleanse daily with soap/water. Leave steristrips on until they fall off.  Report any red simin blood.  Pt verbalizes understanding.

## 2020-07-17 NOTE — PROGRESS NOTES
Wife reports 2.5 mg tue/ fri and 5 mg all other days taking IV abx but denies any missed doses or greens.

## 2020-07-17 NOTE — TELEPHONE ENCOUNTER
Mr Richards sent a message stating that he is very interested in starting heart transplant evaluation .  I advised him that I will speak with the team and be in touch with the next steps.      Pt sent the following message:  I would like to start the process of heart Transplant. Could I please be set up with an appointment with Magi to schedule the required testing such as nicotine and alcohol as I have quit both completely for over six months now. Also to be advised on how to get listed and all other requirements involved.     I also need to speak with the  about disability since I don't forsee me returning to work. I am completely dedicated to being listed and all that it involves including my medication regimen. Please advise me as to when I can proceed    Thank you  Tim Richards

## 2020-07-17 NOTE — TELEPHONE ENCOUNTER
Spoke with patient and wife on the phone. TSH remains elevated, so we will start levothyroxine 50 mcg daily. Discussed proper way to take it. Will repeat TSH in 4 weeks. He is finished with the prednisone taper. Aside from some fatigue, he's been feeling okay.      Please schedule lab in 4 weeks. Thanks!

## 2020-07-21 ENCOUNTER — LAB VISIT (OUTPATIENT)
Dept: LAB | Facility: HOSPITAL | Age: 55
End: 2020-07-21
Attending: INTERNAL MEDICINE
Payer: COMMERCIAL

## 2020-07-21 DIAGNOSIS — M10.9 GOUT, UNSPECIFIED: Primary | ICD-10-CM

## 2020-07-21 LAB
ALBUMIN SERPL BCP-MCNC: 3.8 G/DL (ref 3.5–5.2)
ALP SERPL-CCNC: 88 U/L (ref 55–135)
ALT SERPL W/O P-5'-P-CCNC: 32 U/L (ref 10–44)
ANION GAP SERPL CALC-SCNC: 14 MMOL/L (ref 8–16)
AST SERPL-CCNC: 35 U/L (ref 10–40)
BASOPHILS # BLD AUTO: 0.02 K/UL (ref 0–0.2)
BASOPHILS NFR BLD: 0.3 % (ref 0–1.9)
BILIRUB SERPL-MCNC: 0.5 MG/DL (ref 0.1–1)
BNP SERPL-MCNC: 55 PG/ML (ref 0–99)
BUN SERPL-MCNC: 19 MG/DL (ref 6–20)
CALCIUM SERPL-MCNC: 9.6 MG/DL (ref 8.7–10.5)
CHLORIDE SERPL-SCNC: 98 MMOL/L (ref 95–110)
CO2 SERPL-SCNC: 29 MMOL/L (ref 23–29)
CREAT SERPL-MCNC: 2.4 MG/DL (ref 0.5–1.4)
DIFFERENTIAL METHOD: ABNORMAL
EOSINOPHIL # BLD AUTO: 0.2 K/UL (ref 0–0.5)
EOSINOPHIL NFR BLD: 2.2 % (ref 0–8)
ERYTHROCYTE [DISTWIDTH] IN BLOOD BY AUTOMATED COUNT: 16 % (ref 11.5–14.5)
EST. GFR  (AFRICAN AMERICAN): 34 ML/MIN/1.73 M^2
EST. GFR  (NON AFRICAN AMERICAN): 30 ML/MIN/1.73 M^2
GLUCOSE SERPL-MCNC: 75 MG/DL (ref 70–110)
HCT VFR BLD AUTO: 31.5 % (ref 40–54)
HGB BLD-MCNC: 9.7 G/DL (ref 14–18)
IMM GRANULOCYTES # BLD AUTO: 0.04 K/UL (ref 0–0.04)
IMM GRANULOCYTES NFR BLD AUTO: 0.5 % (ref 0–0.5)
INR PPP: 1.2 (ref 0.8–1.2)
LYMPHOCYTES # BLD AUTO: 1.2 K/UL (ref 1–4.8)
LYMPHOCYTES NFR BLD: 15 % (ref 18–48)
MAGNESIUM SERPL-MCNC: 1.9 MG/DL (ref 1.6–2.6)
MCH RBC QN AUTO: 27.5 PG (ref 27–31)
MCHC RBC AUTO-ENTMCNC: 30.8 G/DL (ref 32–36)
MCV RBC AUTO: 89 FL (ref 82–98)
MONOCYTES # BLD AUTO: 1 K/UL (ref 0.3–1)
MONOCYTES NFR BLD: 13.3 % (ref 4–15)
NEUTROPHILS # BLD AUTO: 5.3 K/UL (ref 1.8–7.7)
NEUTROPHILS NFR BLD: 68.7 % (ref 38–73)
NRBC BLD-RTO: 0 /100 WBC
PLATELET # BLD AUTO: 312 K/UL (ref 150–350)
PMV BLD AUTO: 11.3 FL (ref 9.2–12.9)
POTASSIUM SERPL-SCNC: 3.5 MMOL/L (ref 3.5–5.1)
PROT SERPL-MCNC: 7.4 G/DL (ref 6–8.4)
PROTHROMBIN TIME: 13.6 SEC (ref 9–12.5)
RBC # BLD AUTO: 3.53 M/UL (ref 4.6–6.2)
SODIUM SERPL-SCNC: 141 MMOL/L (ref 136–145)
WBC # BLD AUTO: 7.74 K/UL (ref 3.9–12.7)

## 2020-07-21 PROCEDURE — 80053 COMPREHEN METABOLIC PANEL: CPT

## 2020-07-21 PROCEDURE — 85025 COMPLETE CBC W/AUTO DIFF WBC: CPT

## 2020-07-21 PROCEDURE — 83615 LACTATE (LD) (LDH) ENZYME: CPT

## 2020-07-21 PROCEDURE — 85610 PROTHROMBIN TIME: CPT

## 2020-07-21 PROCEDURE — 83735 ASSAY OF MAGNESIUM: CPT

## 2020-07-21 PROCEDURE — 83880 ASSAY OF NATRIURETIC PEPTIDE: CPT

## 2020-07-22 ENCOUNTER — TELEPHONE (OUTPATIENT)
Dept: TRANSPLANT | Facility: CLINIC | Age: 55
End: 2020-07-22

## 2020-07-22 ENCOUNTER — ANTI-COAG VISIT (OUTPATIENT)
Dept: CARDIOLOGY | Facility: CLINIC | Age: 55
End: 2020-07-22
Payer: COMMERCIAL

## 2020-07-22 DIAGNOSIS — Z79.01 ANTICOAGULATION MONITORING, INR RANGE 2-3: ICD-10-CM

## 2020-07-22 DIAGNOSIS — Z95.811 LVAD (LEFT VENTRICULAR ASSIST DEVICE) PRESENT: ICD-10-CM

## 2020-07-22 PROCEDURE — 93793 ANTICOAG MGMT PT WARFARIN: CPT | Mod: S$GLB,,,

## 2020-07-22 PROCEDURE — 93793 PR ANTICOAGULANT MGMT FOR PT TAKING WARFARIN: ICD-10-PCS | Mod: S$GLB,,,

## 2020-07-22 NOTE — TELEPHONE ENCOUNTER
Labs recevied and reviewed with Dr. Bautista and pt.  Pt denies taking any additional doses of demadex, confirms medications per [employer]. Per Dr. Bautista, plan is for pt to hold demadex today (if not already taken) and tomorrow.  Pt is also to drink more water today and will plan to see pt tomorrow in clinic.      Unable to reach pt, but called and spoke to pt wife.  Plan above reviewed with her.  She verbalizes agreement of plan.  Will plan to follow up with pt tomorrow.  Pt encouraged to call VAD coordinator with any questions problems or concerns. Pt reminded to page VAD coordinator on call for emergencies.  Pt verbalized understanding and in agreement of plan.

## 2020-07-22 NOTE — PROGRESS NOTES
INR not at goal. Medications, chart, and patient findings reviewed. See calendar for adjustments to dose and follow up plan.  Denies missed doses, greens,.  Did get started on levothyroxine a few days ago.  We will aggressively boost today and recheck STAT on Friday.  He is no bridge due to HX of GIB.  Lab episode created and linked for Friday.

## 2020-07-23 ENCOUNTER — OFFICE VISIT (OUTPATIENT)
Dept: TRANSPLANT | Facility: CLINIC | Age: 55
End: 2020-07-23
Payer: COMMERCIAL

## 2020-07-23 ENCOUNTER — CLINICAL SUPPORT (OUTPATIENT)
Dept: CARDIOLOGY | Facility: HOSPITAL | Age: 55
End: 2020-07-23
Attending: INTERNAL MEDICINE
Payer: COMMERCIAL

## 2020-07-23 ENCOUNTER — LAB VISIT (OUTPATIENT)
Dept: LAB | Facility: HOSPITAL | Age: 55
End: 2020-07-23
Attending: INTERNAL MEDICINE
Payer: COMMERCIAL

## 2020-07-23 ENCOUNTER — PATIENT MESSAGE (OUTPATIENT)
Dept: ELECTROPHYSIOLOGY | Facility: CLINIC | Age: 55
End: 2020-07-23

## 2020-07-23 ENCOUNTER — CLINICAL SUPPORT (OUTPATIENT)
Dept: TRANSPLANT | Facility: CLINIC | Age: 55
End: 2020-07-23
Payer: COMMERCIAL

## 2020-07-23 VITALS — TEMPERATURE: 98 F | SYSTOLIC BLOOD PRESSURE: 90 MMHG | WEIGHT: 262 LBS | BODY MASS INDEX: 34.57 KG/M2

## 2020-07-23 DIAGNOSIS — G47.00 INSOMNIA, UNSPECIFIED TYPE: ICD-10-CM

## 2020-07-23 DIAGNOSIS — I49.01 VF (VENTRICULAR FIBRILLATION): ICD-10-CM

## 2020-07-23 DIAGNOSIS — I42.8 NICM (NONISCHEMIC CARDIOMYOPATHY): Chronic | ICD-10-CM

## 2020-07-23 DIAGNOSIS — Z95.811 LVAD (LEFT VENTRICULAR ASSIST DEVICE) PRESENT: Chronic | ICD-10-CM

## 2020-07-23 DIAGNOSIS — Z95.811 LVAD (LEFT VENTRICULAR ASSIST DEVICE) PRESENT: Primary | Chronic | ICD-10-CM

## 2020-07-23 DIAGNOSIS — I50.42 CHRONIC COMBINED SYSTOLIC AND DIASTOLIC HEART FAILURE: ICD-10-CM

## 2020-07-23 DIAGNOSIS — F19.982 SUBSTANCE OR MEDICATION-INDUCED SLEEP DISORDER, INSOMNIA TYPE: ICD-10-CM

## 2020-07-23 DIAGNOSIS — I10 ESSENTIAL HYPERTENSION: Chronic | ICD-10-CM

## 2020-07-23 DIAGNOSIS — T82.7XXD INFECTION INVOLVING IMPLANTABLE CARDIOVERTER-DEFIBRILLATOR (ICD), SUBSEQUENT ENCOUNTER: ICD-10-CM

## 2020-07-23 LAB
ALBUMIN SERPL BCP-MCNC: 3.6 G/DL (ref 3.5–5.2)
ALP SERPL-CCNC: 96 U/L (ref 55–135)
ALT SERPL W/O P-5'-P-CCNC: 27 U/L (ref 10–44)
ANION GAP SERPL CALC-SCNC: 11 MMOL/L (ref 8–16)
AST SERPL-CCNC: 29 U/L (ref 10–40)
BASOPHILS # BLD AUTO: 0.03 K/UL (ref 0–0.2)
BASOPHILS NFR BLD: 0.4 % (ref 0–1.9)
BILIRUB DIRECT SERPL-MCNC: 0.2 MG/DL (ref 0.1–0.3)
BILIRUB SERPL-MCNC: 0.4 MG/DL (ref 0.1–1)
BNP SERPL-MCNC: 108 PG/ML (ref 0–99)
BUN SERPL-MCNC: 21 MG/DL (ref 6–20)
CALCIUM SERPL-MCNC: 9.8 MG/DL (ref 8.7–10.5)
CHLORIDE SERPL-SCNC: 101 MMOL/L (ref 95–110)
CO2 SERPL-SCNC: 28 MMOL/L (ref 23–29)
CREAT SERPL-MCNC: 2.4 MG/DL (ref 0.5–1.4)
CRP SERPL-MCNC: 12.4 MG/L (ref 0–8.2)
DIFFERENTIAL METHOD: ABNORMAL
EOSINOPHIL # BLD AUTO: 0.2 K/UL (ref 0–0.5)
EOSINOPHIL NFR BLD: 2.2 % (ref 0–8)
ERYTHROCYTE [DISTWIDTH] IN BLOOD BY AUTOMATED COUNT: 15.9 % (ref 11.5–14.5)
EST. GFR  (AFRICAN AMERICAN): 34.3 ML/MIN/1.73 M^2
EST. GFR  (NON AFRICAN AMERICAN): 29.7 ML/MIN/1.73 M^2
GLUCOSE SERPL-MCNC: 89 MG/DL (ref 70–110)
HCT VFR BLD AUTO: 34 % (ref 40–54)
HGB BLD-MCNC: 10.2 G/DL (ref 14–18)
IMM GRANULOCYTES # BLD AUTO: 0.03 K/UL (ref 0–0.04)
IMM GRANULOCYTES NFR BLD AUTO: 0.4 % (ref 0–0.5)
INR PPP: 1.2 (ref 0.8–1.2)
LDH SERPL L TO P-CCNC: 381 U/L (ref 110–260)
LYMPHOCYTES # BLD AUTO: 1 K/UL (ref 1–4.8)
LYMPHOCYTES NFR BLD: 13.8 % (ref 18–48)
MAGNESIUM SERPL-MCNC: 1.8 MG/DL (ref 1.6–2.6)
MCH RBC QN AUTO: 27.6 PG (ref 27–31)
MCHC RBC AUTO-ENTMCNC: 30 G/DL (ref 32–36)
MCV RBC AUTO: 92 FL (ref 82–98)
MONOCYTES # BLD AUTO: 0.7 K/UL (ref 0.3–1)
MONOCYTES NFR BLD: 9.9 % (ref 4–15)
NEUTROPHILS # BLD AUTO: 5.1 K/UL (ref 1.8–7.7)
NEUTROPHILS NFR BLD: 73.3 % (ref 38–73)
NRBC BLD-RTO: 0 /100 WBC
PHOSPHATE SERPL-MCNC: 2.9 MG/DL (ref 2.7–4.5)
PLATELET # BLD AUTO: 272 K/UL (ref 150–350)
PMV BLD AUTO: 9.6 FL (ref 9.2–12.9)
POTASSIUM SERPL-SCNC: 3.4 MMOL/L (ref 3.5–5.1)
PREALB SERPL-MCNC: 24 MG/DL (ref 20–43)
PROT SERPL-MCNC: 7.5 G/DL (ref 6–8.4)
PROTHROMBIN TIME: 13.3 SEC (ref 9–12.5)
RBC # BLD AUTO: 3.7 M/UL (ref 4.6–6.2)
SODIUM SERPL-SCNC: 140 MMOL/L (ref 136–145)
WBC # BLD AUTO: 6.97 K/UL (ref 3.9–12.7)

## 2020-07-23 PROCEDURE — 83735 ASSAY OF MAGNESIUM: CPT

## 2020-07-23 PROCEDURE — 84100 ASSAY OF PHOSPHORUS: CPT

## 2020-07-23 PROCEDURE — 83615 LACTATE (LD) (LDH) ENZYME: CPT

## 2020-07-23 PROCEDURE — 86140 C-REACTIVE PROTEIN: CPT

## 2020-07-23 PROCEDURE — 83880 ASSAY OF NATRIURETIC PEPTIDE: CPT

## 2020-07-23 PROCEDURE — 99214 PR OFFICE/OUTPT VISIT, EST, LEVL IV, 30-39 MIN: ICD-10-PCS | Mod: S$GLB,,, | Performed by: INTERNAL MEDICINE

## 2020-07-23 PROCEDURE — 84134 ASSAY OF PREALBUMIN: CPT

## 2020-07-23 PROCEDURE — 36415 COLL VENOUS BLD VENIPUNCTURE: CPT

## 2020-07-23 PROCEDURE — 93750 OP LVAD INTERROGATION: ICD-10-PCS | Mod: S$GLB,,, | Performed by: INTERNAL MEDICINE

## 2020-07-23 PROCEDURE — 82248 BILIRUBIN DIRECT: CPT

## 2020-07-23 PROCEDURE — 85025 COMPLETE CBC W/AUTO DIFF WBC: CPT

## 2020-07-23 PROCEDURE — 85610 PROTHROMBIN TIME: CPT

## 2020-07-23 PROCEDURE — 99999 PR PBB SHADOW E&M-EST. PATIENT-LVL IV: ICD-10-PCS | Mod: PBBFAC,,, | Performed by: INTERNAL MEDICINE

## 2020-07-23 PROCEDURE — 99999 PR PBB SHADOW E&M-EST. PATIENT-LVL IV: CPT | Mod: PBBFAC,,, | Performed by: INTERNAL MEDICINE

## 2020-07-23 PROCEDURE — 93750 INTERROGATION VAD IN PERSON: CPT | Mod: S$GLB,,, | Performed by: INTERNAL MEDICINE

## 2020-07-23 PROCEDURE — 80053 COMPREHEN METABOLIC PANEL: CPT

## 2020-07-23 PROCEDURE — 99214 OFFICE O/P EST MOD 30 MIN: CPT | Mod: S$GLB,,, | Performed by: INTERNAL MEDICINE

## 2020-07-23 RX ORDER — ZOLPIDEM TARTRATE 10 MG/1
10 TABLET ORAL NIGHTLY PRN
Qty: 30 TABLET | Refills: 5 | Status: SHIPPED | OUTPATIENT
Start: 2020-07-23 | End: 2020-09-24 | Stop reason: SDUPTHER
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
## 2020-07-23 NOTE — PROGRESS NOTES
"Subjective:   Patient ID:  Tim Richards is a 54 y.o. male who presents for LVAD followup visit.    Implant Date: 3/8/2018     Heartmate II RPM [id number]  3/9/18 outflow graft changed     INR goal: 2-3   NO Bridge with heparin  Antiplatelets: stopped on 7/2019 due to GIB.   ASA 81 mg restarted 4/23/2020    TXP SACHI INTERROGATIONS 7/23/2020   Type HeartMate II   Flow 5.2   Speed 9800   PI 3.9   Power (Jackson) 6.3   LSL 9400   Pulsatility Intermittent pulse       HPI   Mr. Richards is a very pleasant 53 yo black male with DCM, HBP, CHF stage D s/p HM 2 placement on 3/8/2018 who was recently admitted for ICD pocket infection on 7/1. Wound cultures grew Fusobacterium nucleatum and Prevotella (B.) Melaninogenica. He was treated with IV antibiotics. Underwent device extraction on 7/10 by Dr Yun. Had significant bleeding during procedure requiring transfusion of 2 unit of PRBCs. Also was hypotensive after procedure requiring IVF and . He was observed in ICU overnight and transferred back to regular floor the following day. On 7/13 his Hgb was 7.5 and he reported dizziness and fatigue. He was transfused one unit of PRBCs for symptomatic anemia. Discharged home on stable condition, on IV cefepime and LifeVest. Plan was to follow up with Dr Yun 1 week after completion of IV abx (will complete of 7/24) for subQ ICD. Comes today for post-hospital F/U visit. VAD speed is at 9800 rpm. No VAD alarms noted on interrogation occasional PI events . BP is 90 mm of Hg. DLES is a "1". INR is subtherapeutic at 1.2. LDh is at baseline.     Review of Systems   Constitution: Negative. Negative for chills, decreased appetite, diaphoresis, fever, malaise/fatigue, night sweats, weight gain and weight loss.   Eyes: Negative.    Cardiovascular: Negative for chest pain, claudication, cyanosis, dyspnea on exertion, irregular heartbeat, leg swelling, near-syncope, orthopnea, palpitations, paroxysmal nocturnal dyspnea and syncope.   Respiratory: " "Negative for cough, hemoptysis and shortness of breath.    Endocrine: Negative.    Hematologic/Lymphatic: Negative.    Skin: Negative for color change, dry skin and nail changes.   Musculoskeletal: Negative.    Gastrointestinal: Negative.    Genitourinary: Negative.    Neurological: Negative for weakness.       Objective:   Blood pressure (!) 90/0, temperature 98.2 °F (36.8 °C), temperature source Oral, weight 118.8 kg (262 lb).body mass index is unknown because there is no height or weight on file.    Doppler: 90    Physical Exam   Constitutional: He appears well-developed.   There were no vitals taken for this visit.     HENT:   Head: Normocephalic.   Neck: No JVD present. Carotid bruit is not present.   Cardiovascular: Regular rhythm and normal heart sounds.   No murmur heard.  Smooth VAD hum. DLES is "1". Wound appears dehiscent.    Pulmonary/Chest: Effort normal and breath sounds normal. No respiratory distress. He has no wheezes. He has no rales.   Abdominal: Soft. Bowel sounds are normal. He exhibits no distension. There is no abdominal tenderness. There is no rebound.   Neurological: He is alert.   Skin: Skin is warm.   Vitals reviewed.      Lab Results   Component Value Date    WBC 6.97 07/23/2020    HGB 10.2 (L) 07/23/2020    HCT 34.0 (L) 07/23/2020    MCV 92 07/23/2020     07/23/2020    CO2 28 07/23/2020    CREATININE 2.4 (H) 07/23/2020    CALCIUM 9.8 07/23/2020    ALBUMIN 3.6 07/23/2020    AST 29 07/23/2020     (H) 07/23/2020    ALT 27 07/23/2020     (H) 07/23/2020       Lab Results   Component Value Date    INR 1.2 07/23/2020    INR 1.2 07/21/2020    INR 1.2 07/17/2020       BNP   Date Value Ref Range Status   07/23/2020 108 (H) 0 - 99 pg/mL Final     Comment:     Values of less than 100 pg/ml are consistent with non-CHF populations.   07/21/2020 55 0 - 99 pg/mL Final     Comment:     Values of less than 100 pg/ml are consistent with non-CHF populations.   07/13/2020 297 (H) 0 - 99 " pg/mL Final     Comment:     Values of less than 100 pg/ml are consistent with non-CHF populations.       LD   Date Value Ref Range Status   07/23/2020 381 (H) 110 - 260 U/L Final     Comment:     Results are increased in hemolyzed samples.   07/13/2020 302 (H) 110 - 260 U/L Final     Comment:     Results are increased in hemolyzed samples.   07/12/2020 425 (H) 110 - 260 U/L Final     Comment:     Results are increased in hemolyzed samples.       Assessment:      1. LVAD (left ventricular assist device) present    2. Chronic combined systolic and diastolic heart failure    3. NICM (nonischemic cardiomyopathy)    4. Infection involving implantable cardioverter-defibrillator (ICD), subsequent encounter    5. Essential hypertension        Plan:   Patient is now NYHA class II. BP is controlled.  INR is subtherapeutic. No Heparin bridge due to previous bleeding. Coumadin clinic to adjust his dose.   Previous ICD site with wound dehiscence. Patient weill be seen at our device clinic today.   Sleep study to r/o CHICHI  VAD interrogation was performed in clinic  Any VAD management kits dispensed today medically necessary  Recommend 2 gram sodium restriction and 1500cc fluid restriction.  Encourage physical activity with graded exercise program.  Requested patient to weigh themselves daily, and to notify us if their weight increases by more than 3 lbs in 1 day or 5 lbs in 1 week.   RTC in 1 month     Listed for transplant: No. Implanted as DT.     UNOS Patient Status  Functional Status: 90% - Able to carry on normal activity: minor symptoms of disease  Physical Capacity: No Limitations  Working for Income: No  If no, reason not working: Demands of Treatment    Guerda Bautista MD

## 2020-07-23 NOTE — LETTER
July 23, 2020        Nader Chiu  [address]  [address]  [place] [address]  Phone: [phone]  Fax: [phone]             [facility] [address] [place] [address]  Phone: [phone]   Patient: Tim Richards   MR Number: [id number]   YOB: 1965   Date of Visit: 7/23/2020       Dear Dr. Nader Chiu    Thank you for referring Tim Richards to me for evaluation. Attached you will find relevant portions of my assessment and plan of care.    If you have questions, please do not hesitate to call me. I look forward to following Tim Richards along with you.    Sincerely,    Guerda Bautista MD    Enclosure    If you would like to receive this communication electronically, please contact [email or web address] or [phone] to request [employer] Link access.    [employer] Link is a tool which provides read-only access to select patient information with whom you have a relationship. Its easy to use and provides real time access to review your patients record including encounter summaries, notes, results, and demographic information.    If you feel you have received this communication in error or would no longer like to receive these types of communications, please e-mail [email or web address]

## 2020-07-23 NOTE — PROCEDURES
TXP SACHI INTERROGATIONS 7/23/2020 7/13/2020 7/13/2020 7/13/2020 7/13/2020 7/13/2020 7/13/2020   Type HeartMate II - - - - - -   Flow 5.2 - - - - - -   Speed 9800 - - - - - -   PI 3.9 - - - - - -   Power (Jackson) 6.3 - - - - - -   LSL 9400 - - - - - -   Pulsatility Intermittent pulse Intermittent pulse Intermittent pulse Intermittent pulse Intermittent pulse Intermittent pulse Intermittent pulse   }

## 2020-07-24 ENCOUNTER — DOCUMENTATION ONLY (OUTPATIENT)
Dept: CARDIOLOGY | Facility: HOSPITAL | Age: 55
End: 2020-07-24

## 2020-07-24 ENCOUNTER — TELEPHONE (OUTPATIENT)
Dept: TRANSPLANT | Facility: CLINIC | Age: 55
End: 2020-07-24

## 2020-07-24 NOTE — TELEPHONE ENCOUNTER
MARIA DEL CARMEN received notification from [facility] Coordinator Wendy Collazo, that pt's wife Kelly Richards ([phone]) wanting a call from MARIA DEL CARMEN regarding questions about disability. MARIA DEL CARMEN contacted pt's wife who states that they are going to apply for SSDI and would like to know if they have to provide [employer] with pt's medical records. SW notified pt's wife that [employer] will have pt sign a release of authorization to get his medical records. Pt's wife expressed relief in hearing this. Wife denying any further questions or concerns and states that pt plans on applying for SSDI soon. SW remains available.

## 2020-07-24 NOTE — PROGRESS NOTES
7/23/2020 Pt was brought over by Dr. Bautista to re-evaluate patient's wound. Dsg was removed. The wound had no glue or steri strips and had light drainage. Wound was cleansed and edges were approximated and glued with Dermabond. Steri strips were placed over it and then covered with aquacel. Pt was instructed to keep the area dry for 48 hrs and apply warm compress to hematoma a couple of times a day.  Pt and wife were also instructed to contact clinic if drainage continued or hematoma grew bigger. Both verbalized understanding. Picture is under media tab.

## 2020-07-24 NOTE — PROGRESS NOTES
7/24 - today's INR not scheduled/pt did not attend lab.  I LVM for him to call us and report if he can go to lab today.  If not we will need to move to Monday 7/27.

## 2020-07-27 DIAGNOSIS — Z95.811 LVAD (LEFT VENTRICULAR ASSIST DEVICE) PRESENT: Primary | ICD-10-CM

## 2020-07-28 ENCOUNTER — LAB VISIT (OUTPATIENT)
Dept: LAB | Facility: HOSPITAL | Age: 55
End: 2020-07-28
Attending: INTERNAL MEDICINE
Payer: COMMERCIAL

## 2020-07-28 DIAGNOSIS — M10.9 GOUT, UNSPECIFIED: Primary | ICD-10-CM

## 2020-07-28 LAB
ALBUMIN SERPL BCP-MCNC: 3.7 G/DL (ref 3.5–5.2)
ALP SERPL-CCNC: 85 U/L (ref 55–135)
ALT SERPL W/O P-5'-P-CCNC: 22 U/L (ref 10–44)
ANION GAP SERPL CALC-SCNC: 10 MMOL/L (ref 8–16)
AST SERPL-CCNC: 30 U/L (ref 10–40)
BASOPHILS # BLD AUTO: 0.02 K/UL (ref 0–0.2)
BASOPHILS NFR BLD: 0.3 % (ref 0–1.9)
BILIRUB SERPL-MCNC: 0.4 MG/DL (ref 0.1–1)
BNP SERPL-MCNC: 170 PG/ML (ref 0–99)
BUN SERPL-MCNC: 18 MG/DL (ref 6–20)
CALCIUM SERPL-MCNC: 9.6 MG/DL (ref 8.7–10.5)
CHLORIDE SERPL-SCNC: 102 MMOL/L (ref 95–110)
CO2 SERPL-SCNC: 28 MMOL/L (ref 23–29)
CREAT SERPL-MCNC: 2 MG/DL (ref 0.5–1.4)
DIFFERENTIAL METHOD: ABNORMAL
EOSINOPHIL # BLD AUTO: 0.1 K/UL (ref 0–0.5)
EOSINOPHIL NFR BLD: 2.2 % (ref 0–8)
ERYTHROCYTE [DISTWIDTH] IN BLOOD BY AUTOMATED COUNT: 15.7 % (ref 11.5–14.5)
EST. GFR  (AFRICAN AMERICAN): 43 ML/MIN/1.73 M^2
EST. GFR  (NON AFRICAN AMERICAN): 37 ML/MIN/1.73 M^2
GLUCOSE SERPL-MCNC: 72 MG/DL (ref 70–110)
HCT VFR BLD AUTO: 33 % (ref 40–54)
HGB BLD-MCNC: 9.9 G/DL (ref 14–18)
IMM GRANULOCYTES # BLD AUTO: 0.01 K/UL (ref 0–0.04)
IMM GRANULOCYTES NFR BLD AUTO: 0.2 % (ref 0–0.5)
INR PPP: 1.5 (ref 0.8–1.2)
LDH SERPL L TO P-CCNC: 502 U/L (ref 110–260)
LYMPHOCYTES # BLD AUTO: 0.9 K/UL (ref 1–4.8)
LYMPHOCYTES NFR BLD: 15.7 % (ref 18–48)
MAGNESIUM SERPL-MCNC: 1.6 MG/DL (ref 1.6–2.6)
MCH RBC QN AUTO: 27.3 PG (ref 27–31)
MCHC RBC AUTO-ENTMCNC: 30 G/DL (ref 32–36)
MCV RBC AUTO: 91 FL (ref 82–98)
MONOCYTES # BLD AUTO: 0.7 K/UL (ref 0.3–1)
MONOCYTES NFR BLD: 11.2 % (ref 4–15)
NEUTROPHILS # BLD AUTO: 4.2 K/UL (ref 1.8–7.7)
NEUTROPHILS NFR BLD: 70.4 % (ref 38–73)
NRBC BLD-RTO: 0 /100 WBC
PLATELET # BLD AUTO: 255 K/UL (ref 150–350)
PMV BLD AUTO: 11.5 FL (ref 9.2–12.9)
POTASSIUM SERPL-SCNC: 4 MMOL/L (ref 3.5–5.1)
PROT SERPL-MCNC: 7.3 G/DL (ref 6–8.4)
PROTHROMBIN TIME: 16.1 SEC (ref 9–12.5)
RBC # BLD AUTO: 3.63 M/UL (ref 4.6–6.2)
SODIUM SERPL-SCNC: 140 MMOL/L (ref 136–145)
WBC # BLD AUTO: 5.99 K/UL (ref 3.9–12.7)

## 2020-07-28 PROCEDURE — 85025 COMPLETE CBC W/AUTO DIFF WBC: CPT

## 2020-07-28 PROCEDURE — 83880 ASSAY OF NATRIURETIC PEPTIDE: CPT

## 2020-07-28 PROCEDURE — 83735 ASSAY OF MAGNESIUM: CPT

## 2020-07-28 PROCEDURE — 80053 COMPREHEN METABOLIC PANEL: CPT

## 2020-07-28 PROCEDURE — 85610 PROTHROMBIN TIME: CPT

## 2020-07-28 PROCEDURE — 83615 LACTATE (LD) (LDH) ENZYME: CPT

## 2020-07-28 PROCEDURE — 36415 COLL VENOUS BLD VENIPUNCTURE: CPT

## 2020-07-29 ENCOUNTER — TELEPHONE (OUTPATIENT)
Dept: TRANSPLANT | Facility: CLINIC | Age: 55
End: 2020-07-29

## 2020-07-29 ENCOUNTER — ANTI-COAG VISIT (OUTPATIENT)
Dept: CARDIOLOGY | Facility: CLINIC | Age: 55
End: 2020-07-29
Payer: COMMERCIAL

## 2020-07-29 DIAGNOSIS — Z95.811 LVAD (LEFT VENTRICULAR ASSIST DEVICE) PRESENT: ICD-10-CM

## 2020-07-29 DIAGNOSIS — Z79.01 ANTICOAGULATION MONITORING, INR RANGE 2-3: ICD-10-CM

## 2020-07-29 PROCEDURE — 93793 PR ANTICOAGULANT MGMT FOR PT TAKING WARFARIN: ICD-10-PCS | Mod: S$GLB,,,

## 2020-07-29 PROCEDURE — 93793 ANTICOAG MGMT PT WARFARIN: CPT | Mod: S$GLB,,,

## 2020-07-29 NOTE — PROGRESS NOTES
Patient wife confirmed dose and was advised - reports that patient has  w/ [facility] will send order - She r/s from 8/3 to 8/4.

## 2020-07-29 NOTE — PROGRESS NOTES
INR not at goal. Medications, chart, and patient findings reviewed. See calendar for adjustments to dose and follow up plan.  INR improving, pt did not show up for INR check Friday.  Boosting and will increase dose for now.

## 2020-07-29 NOTE — TELEPHONE ENCOUNTER
"Pt labs received and reviewed.  Called and spoke to pt wife.  Reviewed labs with her and asked how pt doing.  She reports pt is has been a little dizzy with getting up but pt wife reports pt has not been increasing PO fluid intake as he should, he is instead drinking coke.  Encouraged pt wife to please have him drink more water instead of coke.  She verbalizes agreement.  She denies patient having any lvad alarms or power elevations. Reports otherwise doing "ok".  Explained LDH result to pt wife and concern, she reports sample was collected by home health.  Due to likely time of transportation to lab, sample may have falsely elevated LDH.  Will plan for pt to go to lab for repeat, wife said they can go tomorrow.  Appt scheduled and pt wife in agreement of plan.   Pt encouraged to call VAD coordinator with any questions problems or concerns. Pt reminded to page VAD coordinator on call for emergencies.  Pt verbalized understanding and in agreement of plan.     "

## 2020-07-30 ENCOUNTER — LAB VISIT (OUTPATIENT)
Dept: LAB | Facility: HOSPITAL | Age: 55
End: 2020-07-30
Attending: INTERNAL MEDICINE
Payer: COMMERCIAL

## 2020-07-30 DIAGNOSIS — Z95.811 HEART REPLACED BY HEART ASSIST DEVICE: ICD-10-CM

## 2020-07-30 DIAGNOSIS — Z95.811 LVAD (LEFT VENTRICULAR ASSIST DEVICE) PRESENT: Chronic | ICD-10-CM

## 2020-07-30 DIAGNOSIS — I50.9 HEART FAILURE: ICD-10-CM

## 2020-07-30 LAB
ALBUMIN SERPL BCP-MCNC: 3.6 G/DL (ref 3.5–5.2)
ALP SERPL-CCNC: 84 U/L (ref 55–135)
ALT SERPL W/O P-5'-P-CCNC: 17 U/L (ref 10–44)
ANION GAP SERPL CALC-SCNC: 8 MMOL/L (ref 8–16)
AST SERPL-CCNC: 26 U/L (ref 10–40)
BILIRUB SERPL-MCNC: 0.4 MG/DL (ref 0.1–1)
BNP SERPL-MCNC: 168 PG/ML (ref 0–99)
BUN SERPL-MCNC: 16 MG/DL (ref 6–20)
CALCIUM SERPL-MCNC: 9.5 MG/DL (ref 8.7–10.5)
CHLORIDE SERPL-SCNC: 103 MMOL/L (ref 95–110)
CO2 SERPL-SCNC: 29 MMOL/L (ref 23–29)
CREAT SERPL-MCNC: 2 MG/DL (ref 0.5–1.4)
EST. GFR  (AFRICAN AMERICAN): 43 ML/MIN/1.73 M^2
EST. GFR  (NON AFRICAN AMERICAN): 37 ML/MIN/1.73 M^2
GLUCOSE SERPL-MCNC: 88 MG/DL (ref 70–110)
INR PPP: 1.4 (ref 0.8–1.2)
LDH SERPL L TO P-CCNC: 390 U/L (ref 110–260)
POTASSIUM SERPL-SCNC: 3.6 MMOL/L (ref 3.5–5.1)
PROT SERPL-MCNC: 7.3 G/DL (ref 6–8.4)
PROTHROMBIN TIME: 15.3 SEC (ref 9–12.5)
SODIUM SERPL-SCNC: 140 MMOL/L (ref 136–145)

## 2020-07-30 PROCEDURE — 80053 COMPREHEN METABOLIC PANEL: CPT

## 2020-07-30 PROCEDURE — 85610 PROTHROMBIN TIME: CPT

## 2020-07-30 PROCEDURE — 83880 ASSAY OF NATRIURETIC PEPTIDE: CPT

## 2020-07-30 PROCEDURE — 83615 LACTATE (LD) (LDH) ENZYME: CPT

## 2020-07-31 ENCOUNTER — TELEPHONE (OUTPATIENT)
Dept: TRANSPLANT | Facility: CLINIC | Age: 55
End: 2020-07-31

## 2020-07-31 ENCOUNTER — PATIENT MESSAGE (OUTPATIENT)
Dept: TRANSPLANT | Facility: CLINIC | Age: 55
End: 2020-07-31

## 2020-07-31 NOTE — TELEPHONE ENCOUNTER
Spoke with pt's wife. The patient is finished with antibiotics. Sees ID on Tuesday. Needed follow up with Jama to determine next steps after extraction.

## 2020-07-31 NOTE — TELEPHONE ENCOUNTER
"PT wife called VAD coordinator to report concerns for pt ICD wound still "not looking right" and still having a hematoma per pt wife.  PT wife reports concerns especially given pt has midline and IV abx are due to be completed.  Pt wife requesting assistance as she reports trying to reach wound clinic but has been unable to.  Pt wife instructed to please send pictures via patient portal and we will forward to EP for evaluation.  PT wife verbalizes understanding and appreciation of assistance.  Explained to her to please Call VAD coordinator back if they do not speak to wound clinic prior to end of day.   Pt wife encouraged to call VAD coordinator with any questions problems or concerns. Pt wife reminded to page VAD coordinator on call for emergencies.  Pt wife verbalized understanding and in agreement of plan.     "

## 2020-08-04 ENCOUNTER — LAB VISIT (OUTPATIENT)
Dept: LAB | Facility: HOSPITAL | Age: 55
End: 2020-08-04
Payer: COMMERCIAL

## 2020-08-04 ENCOUNTER — OFFICE VISIT (OUTPATIENT)
Dept: INFECTIOUS DISEASES | Facility: CLINIC | Age: 55
End: 2020-08-04
Payer: COMMERCIAL

## 2020-08-04 ENCOUNTER — ANTI-COAG VISIT (OUTPATIENT)
Dept: CARDIOLOGY | Facility: CLINIC | Age: 55
End: 2020-08-04
Payer: COMMERCIAL

## 2020-08-04 VITALS — TEMPERATURE: 99 F | HEIGHT: 73 IN | BODY MASS INDEX: 37.51 KG/M2 | WEIGHT: 283.06 LBS

## 2020-08-04 DIAGNOSIS — Z79.01 ANTICOAGULATION MONITORING, INR RANGE 2-3: ICD-10-CM

## 2020-08-04 DIAGNOSIS — Z45.2 PICC (PERIPHERALLY INSERTED CENTRAL CATHETER) REMOVAL: ICD-10-CM

## 2020-08-04 DIAGNOSIS — Z95.811 PRESENCE OF LEFT VENTRICULAR ASSIST DEVICE (LVAD): ICD-10-CM

## 2020-08-04 DIAGNOSIS — Z95.811 LVAD (LEFT VENTRICULAR ASSIST DEVICE) PRESENT: Chronic | ICD-10-CM

## 2020-08-04 DIAGNOSIS — T82.7XXD INFECTION INVOLVING IMPLANTABLE CARDIOVERTER-DEFIBRILLATOR (ICD), SUBSEQUENT ENCOUNTER: Primary | ICD-10-CM

## 2020-08-04 DIAGNOSIS — Z95.811 LVAD (LEFT VENTRICULAR ASSIST DEVICE) PRESENT: ICD-10-CM

## 2020-08-04 LAB
INR PPP: 1.5 (ref 0.8–1.2)
PROTHROMBIN TIME: 16.1 SEC (ref 9–12.5)

## 2020-08-04 PROCEDURE — 85610 PROTHROMBIN TIME: CPT

## 2020-08-04 PROCEDURE — 99213 OFFICE O/P EST LOW 20 MIN: CPT | Mod: S$GLB,,, | Performed by: INTERNAL MEDICINE

## 2020-08-04 PROCEDURE — 99213 PR OFFICE/OUTPT VISIT, EST, LEVL III, 20-29 MIN: ICD-10-PCS | Mod: S$GLB,,, | Performed by: INTERNAL MEDICINE

## 2020-08-04 PROCEDURE — 99999 PR PBB SHADOW E&M-EST. PATIENT-LVL III: ICD-10-PCS | Mod: PBBFAC,,, | Performed by: INTERNAL MEDICINE

## 2020-08-04 PROCEDURE — 36415 COLL VENOUS BLD VENIPUNCTURE: CPT

## 2020-08-04 PROCEDURE — 93793 PR ANTICOAGULANT MGMT FOR PT TAKING WARFARIN: ICD-10-PCS | Mod: S$GLB,,,

## 2020-08-04 PROCEDURE — 93793 ANTICOAG MGMT PT WARFARIN: CPT | Mod: S$GLB,,,

## 2020-08-04 PROCEDURE — 99999 PR PBB SHADOW E&M-EST. PATIENT-LVL III: CPT | Mod: PBBFAC,,, | Performed by: INTERNAL MEDICINE

## 2020-08-04 NOTE — PROGRESS NOTES
Subjective:      Patient ID: Tim Richards is a 54 y.o. male.    Chief Complaint:Hospital Follow Up (EOC IV)      History of Present Illness  HPI     Hospital Follow Up      Additional comments: EOC IV          Last edited by Brenda Samuels MA on 8/4/2020  2:34 PM. (History)      Patient finished IV cefepime on July 31st.  He had no problems with antibiotic therapy.  No diarrhea, no yeast infections.    The defibrillator site is healing well.  There is still some hematoma present.  There is no erythema, edema, drainage, wound dehiscence, or tenderness.    He is interested in continuing with using the LifeVest.  He has questions about whether he requires another implantable defibrillator.    He has no problems with his LVAD.  His driveline exit site is doing well.    Review of Systems   Constitution: Positive for malaise/fatigue. Negative for chills, decreased appetite, fever, night sweats, weight gain and weight loss.   HENT: Negative for congestion, ear pain, hearing loss, hoarse voice, sore throat and tinnitus.    Eyes: Negative for blurred vision, redness and visual disturbance.   Cardiovascular: Positive for palpitations. Negative for chest pain and leg swelling.   Respiratory: Negative for cough, hemoptysis, shortness of breath and sputum production.    Hematologic/Lymphatic: Negative for adenopathy. Bruises/bleeds easily.   Skin: Negative for dry skin, itching, rash and suspicious lesions.   Musculoskeletal: Negative for back pain, joint pain, myalgias and neck pain.   Gastrointestinal: Negative for abdominal pain, constipation, diarrhea, heartburn, nausea and vomiting.   Genitourinary: Negative for dysuria, flank pain, frequency, hematuria, hesitancy and urgency.   Neurological: Positive for dizziness, headaches, numbness, paresthesias and weakness.   Psychiatric/Behavioral: Positive for depression. Negative for memory loss. The patient has insomnia and is nervous/anxious.      Objective:   Physical  Exam  Vitals signs and nursing note reviewed.   Constitutional:       Appearance: Normal appearance.   HENT:      Head: Normocephalic and atraumatic.   Eyes:      Extraocular Movements: Extraocular movements intact.      Conjunctiva/sclera: Conjunctivae normal.      Pupils: Pupils are equal, round, and reactive to light.   Chest:       Musculoskeletal: Normal range of motion.         General: No swelling.   Skin:     General: Skin is warm and dry.   Neurological:      Mental Status: He is alert.       Assessment:       1. Infection involving implantable cardioverter-defibrillator (ICD), subsequent encounter    2. Presence of left ventricular assist device (LVAD)    3. PICC (peripherally inserted central catheter) removal          Plan:       PICC line removed today.  No additional antibiotics needed.  I find no evidence of infection.  If an implantable device needs to be placed, there is no contraindication from an infectious disease standpoint.  Patient may follow up as needed.

## 2020-08-05 ENCOUNTER — OFFICE VISIT (OUTPATIENT)
Dept: ELECTROPHYSIOLOGY | Facility: CLINIC | Age: 55
End: 2020-08-05
Payer: COMMERCIAL

## 2020-08-05 VITALS — HEART RATE: 80 BPM | BODY MASS INDEX: 36.98 KG/M2 | WEIGHT: 279 LBS | RESPIRATION RATE: 18 BRPM | HEIGHT: 73 IN

## 2020-08-05 DIAGNOSIS — I10 ESSENTIAL HYPERTENSION: Chronic | ICD-10-CM

## 2020-08-05 DIAGNOSIS — T82.7XXD INFECTION INVOLVING IMPLANTABLE CARDIOVERTER-DEFIBRILLATOR (ICD), SUBSEQUENT ENCOUNTER: ICD-10-CM

## 2020-08-05 DIAGNOSIS — I42.8 NICM (NONISCHEMIC CARDIOMYOPATHY): Primary | Chronic | ICD-10-CM

## 2020-08-05 DIAGNOSIS — I49.01 VENTRICULAR FIBRILLATION: ICD-10-CM

## 2020-08-05 DIAGNOSIS — I49.01 VF (VENTRICULAR FIBRILLATION): ICD-10-CM

## 2020-08-05 DIAGNOSIS — Z95.811 HEART REPLACED BY HEART ASSIST DEVICE: ICD-10-CM

## 2020-08-05 DIAGNOSIS — I47.20 VT (VENTRICULAR TACHYCARDIA): Chronic | ICD-10-CM

## 2020-08-05 PROCEDURE — 93005 ELECTROCARDIOGRAM TRACING: CPT | Mod: S$GLB,,, | Performed by: INTERNAL MEDICINE

## 2020-08-05 PROCEDURE — 99215 OFFICE O/P EST HI 40 MIN: CPT | Mod: S$GLB,,, | Performed by: INTERNAL MEDICINE

## 2020-08-05 PROCEDURE — 99999 PR PBB SHADOW E&M-EST. PATIENT-LVL III: CPT | Mod: PBBFAC,,, | Performed by: INTERNAL MEDICINE

## 2020-08-05 PROCEDURE — 93010 RHYTHM STRIP: ICD-10-PCS | Mod: S$GLB,,, | Performed by: INTERNAL MEDICINE

## 2020-08-05 PROCEDURE — 93005 RHYTHM STRIP: ICD-10-PCS | Mod: S$GLB,,, | Performed by: INTERNAL MEDICINE

## 2020-08-05 PROCEDURE — 99215 PR OFFICE/OUTPT VISIT, EST, LEVL V, 40-54 MIN: ICD-10-PCS | Mod: S$GLB,,, | Performed by: INTERNAL MEDICINE

## 2020-08-05 PROCEDURE — 99999 PR PBB SHADOW E&M-EST. PATIENT-LVL III: ICD-10-PCS | Mod: PBBFAC,,, | Performed by: INTERNAL MEDICINE

## 2020-08-05 PROCEDURE — 93010 ELECTROCARDIOGRAM REPORT: CPT | Mod: S$GLB,,, | Performed by: INTERNAL MEDICINE

## 2020-08-05 NOTE — PROGRESS NOTES
Electrophysiology Clinic Note   Reason for visit: Device management/ ICD reimplant     HPI:   Tim Richards is a 54 y.o. male with a significant cardiac history that includes   non ischemic cardiomyopathy with severely reduced LVEF s/p ICD ( 2014)  and  s/p HM 2 implant (3/2018), history of VT and VF who presents here today following hospital discharge.  He had a generator change and ICD lead revision on March 9, 2020. He was seen in clinic for follow up and was noted to have drainage from the ICD site. He was subsequently admitted in the inpatient setting for management of  ICD pocket infection. He underwent success device extraction on 7/10/2020 with residual left chest wall pocket hematoma. He was transfused with RBC but did well and was discharged home on IV antibiotics and a lifevest with plans for reimplant after course of antibiotics prescribed by ID. While hospitalized he was evaluated for S-ICD and met the requirement for implant and presents today to discuss that implant. Today he reports that he is stable. He denies any episodes of fevers, chills, night sweats or drainage from pocket.  He reports that he had an alarm from the life vest and was able to abort therapy. Review of the event showed what appeared to be artifact. He followed up with ID and has completed the recommended course of antibiotics and had PICC removed yesterday. He has a life vest and reports adherence to the life vest. He is here today with his spouse.         Past Medical History:   Diagnosis Date    Anticoagulant long-term use     CHF (congestive heart failure)     Dilated cardiomyopathy 1/10/2018    Disorder of kidney and ureter     CKD    Encounter for blood transfusion     Gout     HTN (hypertension)     Thyroid disease     Ventricular tachycardia (paroxysmal)         Social History     Socioeconomic History    Marital status:      Spouse name: Not on file    Number of children: Not on file    Years of  education: Not on file    Highest education level: Not on file   Occupational History     Employer: [employer]    Social Needs    Financial resource strain: Not hard at all    Food insecurity     Worry: Never true     Inability: Never true    Transportation needs     Medical: No     Non-medical: No   Tobacco Use    Smoking status: Former Smoker     Packs/day: 1.00     Years: 31.00     Pack years: 31.00     Types: Cigarettes     Quit date: 2018     Years since quittin.5    Smokeless tobacco: Never Used    Tobacco comment: pt is quiting on his own - pt stated not qualified for program;  pt  quit on his own   Substance and Sexual Activity    Alcohol use: No     Alcohol/week: 0.0 standard drinks     Frequency: Never     Drinks per session: Patient refused     Binge frequency: Never     Comment: one fifth of gin or rum/week    Drug use: No    Sexual activity: Yes     Partners: Female     Birth control/protection: None     Comment: 10/5/17  with same partner 7 years    Lifestyle    Physical activity     Days per week: Not on file     Minutes per session: 60 min    Stress: Very much   Relationships    Social connections     Talks on phone: More than three times a week     Gets together: More than three times a week     Attends Voodoo service: Not on file     Active member of club or organization: Yes     Attends meetings of clubs or organizations: More than 4 times per year     Relationship status:    Other Topics Concern    Not on file   Social History Narrative    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 ) 30 tablet 5    amiodarone (PACERONE) 200 MG Tab Take 1 tablet (200 mg total) by mouth once daily. 30 tablet 11    amLODIPine (NORVASC) 10 MG tablet Take 1 tablet (10 mg total) by mouth once daily. 30 tablet 11    aspirin (ECOTRIN) 81 MG EC tablet Take 1 tablet (81 mg total) by mouth once daily. 100 tablet 3    ceFEPIme (MAXIPIME) 1 gram injection Inject 1 g into the vein every 8 (eight) hours.      doxazosin (CARDURA) 8 MG Tab Take 1 tablet (8 mg total) by mouth every 12 (twelve) hours. 60 tablet 11    ferrous gluconate 324 mg (37.5 mg iron) Tab tablet Take 1 tablet (324 mg total) by mouth daily with breakfast. (Patient taking differently: Take 324 mg by mouth daily with lunch. ) 30 tablet 11    guanFACINE (TENEX) 2 MG tablet Take 1 tablet (2 mg total) by mouth every evening. 30 tablet 11    levothyroxine (SYNTHROID) 50 MCG tablet Take 1 tablet (50 mcg total) by mouth before breakfast. 90 tablet 4    pantoprazole (PROTONIX) 40 MG tablet TAKE 1 TABLET (40 MG TOTAL) BY MOUTH ONCE DAILY. (Patient taking differently: Take 40 mg by mouth daily with lunch. ) 90 tablet 3    paroxetine (PAXIL) 10 MG tablet Take 1 tablet (10 mg total) by mouth every morning. 30 tablet 3    potassium chloride SA (K-DUR,KLOR-CON) 20 MEQ tablet Take 1 tablet (20 mEq total) by mouth once daily. 30 tablet 11    sildenafiL (VIAGRA) 100 MG tablet Take 1 tablet (100 mg total) by mouth daily as needed for Erectile Dysfunction. 10 tablet 1    torsemide (DEMADEX) 20 MG Tab Take 1 tablet (20 mg total) by mouth once daily. 30 tablet 11    vitamin D (VITAMIN D3) 1000 units Tab Take 1,000 Units by mouth once daily.      warfarin (COUMADIN) 5 MG tablet Take 5mg orally daily except 2.5mg orally on Tuesdays / Fridays 30 tablet 11    zolpidem (AMBIEN) 10 mg Tab Take 1 tablet (10 mg total) by mouth nightly as needed. 30 tablet 5     No current  facility-administered medications for this visit.         Constitutional: (-)fevers, (-)chills, (-)night sweats  HEENT: (-) headaches (-) lightheadedness (-) blurry vision, (-) nose bleeds   Cardiovascular: (-)chest pain (-)paroxysmal nocturnal dyspnea (-)orthopnea, chest wall pain,   Respiratory: (-)shortness of breath, (+)dyspnea on exertion (-)hemoptysis  Gastrointestinal: (-)abdominal pain (-)nausea (-)vomiting (-)hematemesis    Musculoskeletal: (-)arthralgias (+)limited range of motion  Neurologic: (-)parasthesias (-)mood disorder (-)anxiety  Endo: (-)polyuria (-)polydipsia (-)heat/cold intolerance  Skin: (-)rash     Physical Exam:   Wt Readings from Last 3 Encounters:   08/05/20 126.6 kg (279 lb)   08/04/20 128.4 kg (283 lb 1.1 oz)   07/23/20 118.8 kg (262 lb)     Temp Readings from Last 3 Encounters:   08/04/20 99.4 °F (37.4 °C) (Oral)   07/23/20 98.2 °F (36.8 °C) (Oral)   07/13/20 98.8 °F (37.1 °C) (Oral)     BP Readings from Last 3 Encounters:   07/23/20 (!) 90/0   07/13/20 (!) 74/0   06/03/20 110/84     Pulse Readings from Last 3 Encounters:   08/05/20 80   07/13/20 78   06/05/20 75       Gen: overweight male, no acute distress, pleasant patient answering questions appropriately, wearing a life vest  HEENT: extra-ocular muscles intact, normocephalic-atraumatic  Neck: no jugular venous distension, no lymphadenopathy, no thyromegaly, no carotid bruits  CVS: regular rate and rhythm, S1S2 normal, no murmurs/rubs/gallops  CHEST: right chest wound, + hematoma, no drainage, very warm to touch, clear to auscultation bilaterally, no wheezes/rales/ronchi  ABD: Soft, non-tender, nondistended, bowel sounds present  EXT: No Edema, 2+ pulses bilaterally  NEURO: awake, alert, and oriented   Skin: No rash     Review of Labs:     Lab Results   Component Value Date    WBC 5.99 07/28/2020    HGB 9.9 (L) 07/28/2020    HCT 33.0 (L) 07/28/2020    MCV 91 07/28/2020     07/28/2020           Lab Results   Component Value  Date    WBC 5.99 07/28/2020    HGB 9.9 (L) 07/28/2020    HCT 33.0 (L) 07/28/2020    MCV 91 07/28/2020     07/28/2020         CMP  Sodium   Date Value Ref Range Status   07/30/2020 140 136 - 145 mmol/L Final     Potassium   Date Value Ref Range Status   07/30/2020 3.6 3.5 - 5.1 mmol/L Final     Chloride   Date Value Ref Range Status   07/30/2020 103 95 - 110 mmol/L Final     CO2   Date Value Ref Range Status   07/30/2020 29 23 - 29 mmol/L Final     Glucose   Date Value Ref Range Status   07/30/2020 88 70 - 110 mg/dL Final     BUN, Bld   Date Value Ref Range Status   07/30/2020 16 6 - 20 mg/dL Final     Creatinine   Date Value Ref Range Status   07/30/2020 2.0 (H) 0.5 - 1.4 mg/dL Final     Calcium   Date Value Ref Range Status   07/30/2020 9.5 8.7 - 10.5 mg/dL Final     Total Protein   Date Value Ref Range Status   07/30/2020 7.3 6.0 - 8.4 g/dL Final     Albumin   Date Value Ref Range Status   07/30/2020 3.6 3.5 - 5.2 g/dL Final     Total Bilirubin   Date Value Ref Range Status   07/30/2020 0.4 0.1 - 1.0 mg/dL Final     Comment:     For infants and newborns, interpretation of results should be based  on gestational age, weight and in agreement with clinical  observations.  Premature Infant recommended reference ranges:  Up to 24 hours.............<8.0 mg/dL  Up to 48 hours............<12.0 mg/dL  3-5 days..................<15.0 mg/dL  6-29 days.................<15.0 mg/dL       Alkaline Phosphatase   Date Value Ref Range Status   07/30/2020 84 55 - 135 U/L Final     AST   Date Value Ref Range Status   07/30/2020 26 10 - 40 U/L Final     ALT   Date Value Ref Range Status   07/30/2020 17 10 - 44 U/L Final     Anion Gap   Date Value Ref Range Status   07/30/2020 8 8 - 16 mmol/L Final     eGFR if    Date Value Ref Range Status   07/30/2020 43 (A) >60 mL/min/1.73 m^2 Final     eGFR if non    Date Value Ref Range Status   07/30/2020 37 (A) >60 mL/min/1.73 m^2 Final     Comment:      Calculation used to obtain the estimated glomerular filtration  rate (eGFR) is the CKD-EPI equation.          Most recent ECG:  Sinus rhythm, complete LBBB      Recent LVAD interrogation:  Type HeartMate II - - - - - -   Flow 5.2 - - - - - -   Speed 9800 - - - - - -   PI 3.9 - - - - - -   Power (Jackson) 6.3 - - - - - -   LSL 9400 - - - - - -   Pulsatility Intermittent pulse Intermittent pulse Intermittent   pulse Intermittent pulse Intermittent pulse Intermittent pulse   Intermittent pulse        Most recent Echo:  7/10/2020 Severe left ventricular systolic dysfunction.  The LVEF 10%.      Assessment/Plan:  Tim Richards is a 54 y.o. male with non ischemic cardiomyoapthy s/p HM II,  History of VT and VF, and recent ICD explant due to infection who presented today to discuss reimplantation of ICD. He has been stable since explant about 3 weeks ago and cleared from infectious standpoint for a new implant by ID. He is currently anticoagulated and he in light of his high infection risk, plan is to implant S-ICD to avoid placing intracardiac leads. Will also perform defibrillator testing at time of implant. Plans were discussed extensive with both patient and spouse who expressed that they understood.       #Hx of VT/VT - on amiodarone and currently on life vest  # NICM s/p LVAD HM II   # S/p BiV explant complicated by chest wall hematoma       Plan:  SCID implant in approximately 3 weeks for secondary prevention    Patient is to keep incision clean and report to ED/ clinician is he has any evidence of infection   Continue to adhere to life vest and current medications

## 2020-08-06 ENCOUNTER — TELEPHONE (OUTPATIENT)
Dept: CARDIOLOGY | Facility: HOSPITAL | Age: 55
End: 2020-08-06

## 2020-08-06 DIAGNOSIS — I49.01 VF (VENTRICULAR FIBRILLATION): Primary | ICD-10-CM

## 2020-08-06 DIAGNOSIS — I42.8 NICM (NONISCHEMIC CARDIOMYOPATHY): ICD-10-CM

## 2020-08-06 NOTE — PROGRESS NOTES
Per note: Sub Q ICD scheduled with Dr. Yun on September 14, 2020. The INR goal for this procedure is 2.0-2.5.

## 2020-08-06 NOTE — TELEPHONE ENCOUNTER
"Called pt to schedule post Sub-Q ICD implant wound ck on this am.  Appt scheduled with pt's wife Kelly on 9/24/20 @ 2:20 pm per her request as to this date would be best for them as well as per the wife, pt "is not a morning person".   Informed the wife should anything change and this appt date and time is no longer convenient for them to please contact the Device Clinic.  Understanding was verbalized.   "

## 2020-08-14 ENCOUNTER — TELEPHONE (OUTPATIENT)
Dept: ENDOCRINOLOGY | Facility: CLINIC | Age: 55
End: 2020-08-14

## 2020-08-14 ENCOUNTER — LAB VISIT (OUTPATIENT)
Dept: LAB | Facility: HOSPITAL | Age: 55
End: 2020-08-14
Attending: INTERNAL MEDICINE
Payer: COMMERCIAL

## 2020-08-14 ENCOUNTER — DOCUMENT SCAN (OUTPATIENT)
Dept: HOME HEALTH SERVICES | Facility: HOSPITAL | Age: 55
End: 2020-08-14
Payer: COMMERCIAL

## 2020-08-14 ENCOUNTER — ANTI-COAG VISIT (OUTPATIENT)
Dept: CARDIOLOGY | Facility: CLINIC | Age: 55
End: 2020-08-14
Payer: COMMERCIAL

## 2020-08-14 DIAGNOSIS — Z79.01 ANTICOAGULATION MONITORING, INR RANGE 2-3: ICD-10-CM

## 2020-08-14 DIAGNOSIS — E03.2 HYPOTHYROIDISM DUE TO DRUGS: Primary | ICD-10-CM

## 2020-08-14 DIAGNOSIS — E03.2 HYPOTHYROIDISM DUE TO AMIODARONE: ICD-10-CM

## 2020-08-14 DIAGNOSIS — T46.2X1A HYPOTHYROIDISM DUE TO AMIODARONE: ICD-10-CM

## 2020-08-14 DIAGNOSIS — Z95.811 LVAD (LEFT VENTRICULAR ASSIST DEVICE) PRESENT: ICD-10-CM

## 2020-08-14 LAB
INR PPP: 1.6 (ref 0.8–1.2)
PROTHROMBIN TIME: 17.7 SEC (ref 9–12.5)
T4 FREE SERPL-MCNC: 1.17 NG/DL (ref 0.71–1.51)
TSH SERPL DL<=0.005 MIU/L-ACNC: 4.13 UIU/ML (ref 0.4–4)

## 2020-08-14 PROCEDURE — 84439 ASSAY OF FREE THYROXINE: CPT

## 2020-08-14 PROCEDURE — 85610 PROTHROMBIN TIME: CPT

## 2020-08-14 PROCEDURE — 93793 ANTICOAG MGMT PT WARFARIN: CPT | Mod: S$GLB,,,

## 2020-08-14 PROCEDURE — 93793 PR ANTICOAGULANT MGMT FOR PT TAKING WARFARIN: ICD-10-PCS | Mod: S$GLB,,,

## 2020-08-14 PROCEDURE — 84443 ASSAY THYROID STIM HORMONE: CPT

## 2020-08-14 PROCEDURE — 36415 COLL VENOUS BLD VENIPUNCTURE: CPT

## 2020-08-14 NOTE — PROGRESS NOTES
INR not at goal. Medications, chart, and patient findings reviewed. See calendar for adjustments to dose and follow up plan.  Slight improvement, will boost today since dose recently increased.  Recheck Monday.

## 2020-08-17 ENCOUNTER — ANTI-COAG VISIT (OUTPATIENT)
Dept: CARDIOLOGY | Facility: CLINIC | Age: 55
End: 2020-08-17
Payer: COMMERCIAL

## 2020-08-17 ENCOUNTER — LAB VISIT (OUTPATIENT)
Dept: LAB | Facility: HOSPITAL | Age: 55
End: 2020-08-17
Attending: INTERNAL MEDICINE
Payer: COMMERCIAL

## 2020-08-17 DIAGNOSIS — Z79.01 ANTICOAGULATION MONITORING, INR RANGE 2-3: ICD-10-CM

## 2020-08-17 DIAGNOSIS — Z95.811 LVAD (LEFT VENTRICULAR ASSIST DEVICE) PRESENT: Chronic | ICD-10-CM

## 2020-08-17 DIAGNOSIS — Z95.811 LVAD (LEFT VENTRICULAR ASSIST DEVICE) PRESENT: ICD-10-CM

## 2020-08-17 LAB
INR PPP: 2.1 (ref 0.8–1.2)
PROTHROMBIN TIME: 23.1 SEC (ref 9–12.5)

## 2020-08-17 PROCEDURE — 93793 PR ANTICOAGULANT MGMT FOR PT TAKING WARFARIN: ICD-10-PCS | Mod: S$GLB,,,

## 2020-08-17 PROCEDURE — 93793 ANTICOAG MGMT PT WARFARIN: CPT | Mod: S$GLB,,,

## 2020-08-17 PROCEDURE — 85610 PROTHROMBIN TIME: CPT

## 2020-08-17 PROCEDURE — 36415 COLL VENOUS BLD VENIPUNCTURE: CPT

## 2020-08-18 ENCOUNTER — TELEPHONE (OUTPATIENT)
Dept: CARDIOTHORACIC SURGERY | Facility: CLINIC | Age: 55
End: 2020-08-18

## 2020-08-18 NOTE — TELEPHONE ENCOUNTER
Returned call to pt regarding his [facility] message to Dr. Kaufman last week to discuss the opportunity of being listed for heart transplant.  Informed pt that Dr. Kaufman reviewed his message and that [facility] would be notified of pt's desire for heart transplant listing, as there is a multidisciplinary group that assesses patients for potential listing.  Pt verbalized understanding of this and appreciation for the call.  I informed pt that I could not provide any further information related to the possibility of listing, which he verbalized understanding to.  Epic message sent to [facility] coordinators regarding this.

## 2020-09-01 DIAGNOSIS — Z87.891 SMOKING HX: Primary | ICD-10-CM

## 2020-09-01 NOTE — PROGRESS NOTES
Nicotine level added to next blood draw.  If negative then consideration can be made for re-opening OHT eval per Dr Rhodes.

## 2020-09-02 RX ORDER — FERROUS GLUCONATE 324(37.5)
324 TABLET ORAL
Qty: 30 TABLET | Refills: 11 | Status: SHIPPED | OUTPATIENT
Start: 2020-09-02 | End: 2020-12-17

## 2020-09-04 ENCOUNTER — ANESTHESIA EVENT (OUTPATIENT)
Dept: MEDSURG UNIT | Facility: HOSPITAL | Age: 55
End: 2020-09-04
Payer: COMMERCIAL

## 2020-09-08 NOTE — PROGRESS NOTES
Pt does not have HH anymore. Pt wife states the pt is scheduled to have some pre-op labs done on 9/11/2020.

## 2020-09-09 RX ORDER — AMLODIPINE BESYLATE 10 MG/1
TABLET ORAL
Qty: 90 TABLET | Refills: 3 | Status: SHIPPED | OUTPATIENT
Start: 2020-09-09 | End: 2021-07-14

## 2020-09-11 ENCOUNTER — LAB VISIT (OUTPATIENT)
Dept: LAB | Facility: HOSPITAL | Age: 55
End: 2020-09-11
Attending: INTERNAL MEDICINE
Payer: COMMERCIAL

## 2020-09-11 ENCOUNTER — LAB VISIT (OUTPATIENT)
Dept: FAMILY MEDICINE | Facility: CLINIC | Age: 55
End: 2020-09-11
Payer: COMMERCIAL

## 2020-09-11 ENCOUNTER — TELEPHONE (OUTPATIENT)
Dept: ELECTROPHYSIOLOGY | Facility: CLINIC | Age: 55
End: 2020-09-11

## 2020-09-11 ENCOUNTER — ANTI-COAG VISIT (OUTPATIENT)
Dept: CARDIOLOGY | Facility: CLINIC | Age: 55
End: 2020-09-11
Payer: COMMERCIAL

## 2020-09-11 DIAGNOSIS — I49.01 VF (VENTRICULAR FIBRILLATION): ICD-10-CM

## 2020-09-11 DIAGNOSIS — Z79.01 ANTICOAGULATION MONITORING, INR RANGE 2-3: ICD-10-CM

## 2020-09-11 DIAGNOSIS — Z87.891 SMOKING HX: ICD-10-CM

## 2020-09-11 DIAGNOSIS — E03.2 HYPOTHYROIDISM DUE TO DRUGS: ICD-10-CM

## 2020-09-11 DIAGNOSIS — Z95.811 LVAD (LEFT VENTRICULAR ASSIST DEVICE) PRESENT: ICD-10-CM

## 2020-09-11 DIAGNOSIS — I42.8 NICM (NONISCHEMIC CARDIOMYOPATHY): ICD-10-CM

## 2020-09-11 LAB
ANION GAP SERPL CALC-SCNC: 11 MMOL/L (ref 8–16)
APTT BLDCRRT: 45.7 SEC (ref 21–32)
BASOPHILS # BLD AUTO: 0.01 K/UL (ref 0–0.2)
BASOPHILS NFR BLD: 0.3 % (ref 0–1.9)
BUN SERPL-MCNC: 12 MG/DL (ref 6–20)
CALCIUM SERPL-MCNC: 9.5 MG/DL (ref 8.7–10.5)
CHLORIDE SERPL-SCNC: 105 MMOL/L (ref 95–110)
CO2 SERPL-SCNC: 24 MMOL/L (ref 23–29)
CREAT SERPL-MCNC: 1.7 MG/DL (ref 0.5–1.4)
DIFFERENTIAL METHOD: ABNORMAL
EOSINOPHIL # BLD AUTO: 0.1 K/UL (ref 0–0.5)
EOSINOPHIL NFR BLD: 2.3 % (ref 0–8)
ERYTHROCYTE [DISTWIDTH] IN BLOOD BY AUTOMATED COUNT: 14.6 % (ref 11.5–14.5)
EST. GFR  (AFRICAN AMERICAN): 52.1 ML/MIN/1.73 M^2
EST. GFR  (NON AFRICAN AMERICAN): 45 ML/MIN/1.73 M^2
GLUCOSE SERPL-MCNC: 86 MG/DL (ref 70–110)
HCT VFR BLD AUTO: 41.1 % (ref 40–54)
HGB BLD-MCNC: 11.9 G/DL (ref 14–18)
IMM GRANULOCYTES # BLD AUTO: 0.01 K/UL (ref 0–0.04)
IMM GRANULOCYTES NFR BLD AUTO: 0.3 % (ref 0–0.5)
INR PPP: 2.3 (ref 0.8–1.2)
LYMPHOCYTES # BLD AUTO: 0.9 K/UL (ref 1–4.8)
LYMPHOCYTES NFR BLD: 23.6 % (ref 18–48)
MCH RBC QN AUTO: 26.7 PG (ref 27–31)
MCHC RBC AUTO-ENTMCNC: 29 G/DL (ref 32–36)
MCV RBC AUTO: 92 FL (ref 82–98)
MONOCYTES # BLD AUTO: 0.5 K/UL (ref 0.3–1)
MONOCYTES NFR BLD: 12.3 % (ref 4–15)
NEUTROPHILS # BLD AUTO: 2.4 K/UL (ref 1.8–7.7)
NEUTROPHILS NFR BLD: 61.2 % (ref 38–73)
NRBC BLD-RTO: 0 /100 WBC
PLATELET # BLD AUTO: 246 K/UL (ref 150–350)
PMV BLD AUTO: 12 FL (ref 9.2–12.9)
POTASSIUM SERPL-SCNC: 3.9 MMOL/L (ref 3.5–5.1)
PROTHROMBIN TIME: 24.3 SEC (ref 9–12.5)
RBC # BLD AUTO: 4.46 M/UL (ref 4.6–6.2)
SODIUM SERPL-SCNC: 140 MMOL/L (ref 136–145)
WBC # BLD AUTO: 3.98 K/UL (ref 3.9–12.7)

## 2020-09-11 PROCEDURE — 93793 ANTICOAG MGMT PT WARFARIN: CPT | Mod: S$GLB,,,

## 2020-09-11 PROCEDURE — 80048 BASIC METABOLIC PNL TOTAL CA: CPT

## 2020-09-11 PROCEDURE — 85610 PROTHROMBIN TIME: CPT

## 2020-09-11 PROCEDURE — 93793 PR ANTICOAGULANT MGMT FOR PT TAKING WARFARIN: ICD-10-PCS | Mod: S$GLB,,,

## 2020-09-11 PROCEDURE — 36415 COLL VENOUS BLD VENIPUNCTURE: CPT | Mod: PO

## 2020-09-11 PROCEDURE — 85025 COMPLETE CBC W/AUTO DIFF WBC: CPT

## 2020-09-11 PROCEDURE — 85730 THROMBOPLASTIN TIME PARTIAL: CPT

## 2020-09-11 PROCEDURE — U0003 INFECTIOUS AGENT DETECTION BY NUCLEIC ACID (DNA OR RNA); SEVERE ACUTE RESPIRATORY SYNDROME CORONAVIRUS 2 (SARS-COV-2) (CORONAVIRUS DISEASE [COVID-19]), AMPLIFIED PROBE TECHNIQUE, MAKING USE OF HIGH THROUGHPUT TECHNOLOGIES AS DESCRIBED BY CMS-2020-01-R: HCPCS

## 2020-09-11 PROCEDURE — 80323 ALKALOIDS NOS: CPT

## 2020-09-11 NOTE — TELEPHONE ENCOUNTER
Contacted pt to confirm procedure for Monday. Spoke  with pt and reviewed pre op instructions including: new arrival time of 11 am, NPO after MN Sunday, okay to take morning meds with small sip of water.  Pt voiced understanding and denied any symptoms of fever, cough, H/A, loss of appetite or smell. Labs and covid  Testing pending at this time. Advised that a mask needs to be worn upon entry into hospital and new visitor policy.

## 2020-09-11 NOTE — TELEPHONE ENCOUNTER
Returned call to pt's wife. Reviewed pre op instructions with pt's wife also. She voiced understanding.      ----- Message from Saray Heller sent at 9/11/2020  2:47 PM CDT -----  Regarding: procedure  Pt's wife is returning a call from a message that was just left regarding pt's upcoming procedure.  The VM you left cut off and she did not get the information you were trying to leave.  She can be reached at [phone].    Thank you

## 2020-09-12 LAB — SARS-COV-2 RNA RESP QL NAA+PROBE: NOT DETECTED

## 2020-09-14 ENCOUNTER — ANTI-COAG VISIT (OUTPATIENT)
Dept: CARDIOLOGY | Facility: CLINIC | Age: 55
End: 2020-09-14
Payer: COMMERCIAL

## 2020-09-14 ENCOUNTER — HOSPITAL ENCOUNTER (OUTPATIENT)
Facility: HOSPITAL | Age: 55
Discharge: HOME | End: 2020-09-15
Attending: INTERNAL MEDICINE | Admitting: INTERNAL MEDICINE
Payer: COMMERCIAL

## 2020-09-14 ENCOUNTER — ANESTHESIA (OUTPATIENT)
Dept: MEDSURG UNIT | Facility: HOSPITAL | Age: 55
End: 2020-09-14
Payer: COMMERCIAL

## 2020-09-14 DIAGNOSIS — Z95.811 LVAD (LEFT VENTRICULAR ASSIST DEVICE) PRESENT: ICD-10-CM

## 2020-09-14 DIAGNOSIS — I49.01 VF (VENTRICULAR FIBRILLATION): ICD-10-CM

## 2020-09-14 DIAGNOSIS — Z95.9 CARDIAC DEVICE IN SITU: ICD-10-CM

## 2020-09-14 DIAGNOSIS — I42.8 NICM (NONISCHEMIC CARDIOMYOPATHY): ICD-10-CM

## 2020-09-14 DIAGNOSIS — Z79.01 ANTICOAGULATION MONITORING, INR RANGE 2-3: ICD-10-CM

## 2020-09-14 LAB
APTT BLDCRRT: 47 SEC (ref 21–32)
INR PPP: 2.5 (ref 0.8–1.2)
PROTHROMBIN TIME: 27 SEC (ref 9–12.5)

## 2020-09-14 PROCEDURE — C1896 LEAD, AICD, NON SING/DUAL: HCPCS | Performed by: INTERNAL MEDICINE

## 2020-09-14 PROCEDURE — 25000003 PHARM REV CODE 250: Performed by: NURSE PRACTITIONER

## 2020-09-14 PROCEDURE — 33270 PR INS/REPL PERM SUBQ DEFIB SYSTEM: ICD-10-PCS | Mod: ,,, | Performed by: INTERNAL MEDICINE

## 2020-09-14 PROCEDURE — D9220A PRA ANESTHESIA: Mod: ANES,,, | Performed by: ANESTHESIOLOGY

## 2020-09-14 PROCEDURE — 93010 ELECTROCARDIOGRAM REPORT: CPT | Mod: 77,,, | Performed by: INTERNAL MEDICINE

## 2020-09-14 PROCEDURE — 25000003 PHARM REV CODE 250: Performed by: NURSE ANESTHETIST, CERTIFIED REGISTERED

## 2020-09-14 PROCEDURE — 93010 ELECTROCARDIOGRAM REPORT: CPT | Mod: ,,, | Performed by: INTERNAL MEDICINE

## 2020-09-14 PROCEDURE — 63600175 PHARM REV CODE 636 W HCPCS: Performed by: NURSE ANESTHETIST, CERTIFIED REGISTERED

## 2020-09-14 PROCEDURE — 93010 EKG 12-LEAD: ICD-10-PCS | Mod: 77,,, | Performed by: INTERNAL MEDICINE

## 2020-09-14 PROCEDURE — 85730 THROMBOPLASTIN TIME PARTIAL: CPT

## 2020-09-14 PROCEDURE — 94761 N-INVAS EAR/PLS OXIMETRY MLT: CPT

## 2020-09-14 PROCEDURE — 25000003 PHARM REV CODE 250: Performed by: INTERNAL MEDICINE

## 2020-09-14 PROCEDURE — D9220A PRA ANESTHESIA: ICD-10-PCS | Mod: ANES,,, | Performed by: ANESTHESIOLOGY

## 2020-09-14 PROCEDURE — 93005 ELECTROCARDIOGRAM TRACING: CPT

## 2020-09-14 PROCEDURE — 63600175 PHARM REV CODE 636 W HCPCS: Performed by: NURSE PRACTITIONER

## 2020-09-14 PROCEDURE — 33270 INS/REP SUBQ DEFIBRILLATOR: CPT | Performed by: INTERNAL MEDICINE

## 2020-09-14 PROCEDURE — 93793 PR ANTICOAGULANT MGMT FOR PT TAKING WARFARIN: ICD-10-PCS | Mod: S$GLB,,,

## 2020-09-14 PROCEDURE — D9220A PRA ANESTHESIA: Mod: CRNA,,, | Performed by: NURSE ANESTHETIST, CERTIFIED REGISTERED

## 2020-09-14 PROCEDURE — 27201037 HC PRESSURE MONITORING SET UP

## 2020-09-14 PROCEDURE — 63600175 PHARM REV CODE 636 W HCPCS: Performed by: ANESTHESIOLOGY

## 2020-09-14 PROCEDURE — 33270 INS/REP SUBQ DEFIBRILLATOR: CPT | Mod: ,,, | Performed by: INTERNAL MEDICINE

## 2020-09-14 PROCEDURE — C1722 AICD, SINGLE CHAMBER: HCPCS | Performed by: INTERNAL MEDICINE

## 2020-09-14 PROCEDURE — 93010 EKG 12-LEAD: ICD-10-PCS | Mod: ,,, | Performed by: INTERNAL MEDICINE

## 2020-09-14 PROCEDURE — 37000009 HC ANESTHESIA EA ADD 15 MINS: Performed by: INTERNAL MEDICINE

## 2020-09-14 PROCEDURE — 36620 INSERTION CATHETER ARTERY: CPT | Mod: 59,,, | Performed by: ANESTHESIOLOGY

## 2020-09-14 PROCEDURE — 36620 PR INSERT CATH,ART,PERCUT,SHORTTERM: ICD-10-PCS | Mod: 59,,, | Performed by: ANESTHESIOLOGY

## 2020-09-14 PROCEDURE — 37000008 HC ANESTHESIA 1ST 15 MINUTES: Performed by: INTERNAL MEDICINE

## 2020-09-14 PROCEDURE — 27000248 HC VAD-ADDITIONAL DAY

## 2020-09-14 PROCEDURE — 93793 ANTICOAG MGMT PT WARFARIN: CPT | Mod: S$GLB,,,

## 2020-09-14 PROCEDURE — D9220A PRA ANESTHESIA: ICD-10-PCS | Mod: CRNA,,, | Performed by: NURSE ANESTHETIST, CERTIFIED REGISTERED

## 2020-09-14 PROCEDURE — 85610 PROTHROMBIN TIME: CPT

## 2020-09-14 DEVICE — SUBCUTANEOUS IMPLANTABLE CARDIOVERTER DEFIBRILLATOR
Type: IMPLANTABLE DEVICE | Site: CHEST | Status: FUNCTIONAL
Brand: EMBLEM™ MRI S-ICD

## 2020-09-14 DEVICE — SUBCUTANEOUS ELECTRODE
Type: IMPLANTABLE DEVICE | Site: CHEST | Status: FUNCTIONAL
Brand: EMBLEM™ S-ICD

## 2020-09-14 RX ORDER — POTASSIUM CHLORIDE 20 MEQ/1
20 TABLET, EXTENDED RELEASE ORAL DAILY
Status: DISCONTINUED | OUTPATIENT
Start: 2020-09-15 | End: 2020-09-15 | Stop reason: HOSPADM

## 2020-09-14 RX ORDER — HYDROCODONE BITARTRATE AND ACETAMINOPHEN 10; 325 MG/1; MG/1
1 TABLET ORAL ONCE
Status: COMPLETED | OUTPATIENT
Start: 2020-09-14 | End: 2020-09-14

## 2020-09-14 RX ORDER — DOXAZOSIN 8 MG/1
8 TABLET ORAL EVERY 12 HOURS
Status: DISCONTINUED | OUTPATIENT
Start: 2020-09-14 | End: 2020-09-14

## 2020-09-14 RX ORDER — AMLODIPINE BESYLATE 10 MG/1
10 TABLET ORAL DAILY
Status: DISCONTINUED | OUTPATIENT
Start: 2020-09-15 | End: 2020-09-14

## 2020-09-14 RX ORDER — CEFAZOLIN SODIUM 1 G/3ML
2 INJECTION, POWDER, FOR SOLUTION INTRAMUSCULAR; INTRAVENOUS
Status: COMPLETED | OUTPATIENT
Start: 2020-09-14 | End: 2020-09-15

## 2020-09-14 RX ORDER — FENTANYL CITRATE 50 UG/ML
25 INJECTION, SOLUTION INTRAMUSCULAR; INTRAVENOUS EVERY 5 MIN PRN
Status: COMPLETED | OUTPATIENT
Start: 2020-09-14 | End: 2020-09-14

## 2020-09-14 RX ORDER — LEVOTHYROXINE SODIUM 50 UG/1
50 TABLET ORAL
Status: DISCONTINUED | OUTPATIENT
Start: 2020-09-15 | End: 2020-09-15 | Stop reason: HOSPADM

## 2020-09-14 RX ORDER — ACETAMINOPHEN 325 MG/1
650 TABLET ORAL EVERY 4 HOURS PRN
Status: DISCONTINUED | OUTPATIENT
Start: 2020-09-14 | End: 2020-09-15 | Stop reason: HOSPADM

## 2020-09-14 RX ORDER — FENTANYL CITRATE 50 UG/ML
INJECTION, SOLUTION INTRAMUSCULAR; INTRAVENOUS
Status: DISCONTINUED | OUTPATIENT
Start: 2020-09-14 | End: 2020-09-14

## 2020-09-14 RX ORDER — WARFARIN SODIUM 5 MG/1
5 TABLET ORAL ONCE
Status: COMPLETED | OUTPATIENT
Start: 2020-09-14 | End: 2020-09-14

## 2020-09-14 RX ORDER — ALLOPURINOL 100 MG/1
100 TABLET ORAL NIGHTLY
Status: DISCONTINUED | OUTPATIENT
Start: 2020-09-14 | End: 2020-09-15 | Stop reason: HOSPADM

## 2020-09-14 RX ORDER — GUANFACINE 1 MG/1
2 TABLET ORAL NIGHTLY
Status: DISCONTINUED | OUTPATIENT
Start: 2020-09-14 | End: 2020-09-14

## 2020-09-14 RX ORDER — BUPIVACAINE HYDROCHLORIDE 2.5 MG/ML
INJECTION, SOLUTION EPIDURAL; INFILTRATION; INTRACAUDAL
Status: DISCONTINUED | OUTPATIENT
Start: 2020-09-14 | End: 2020-09-15 | Stop reason: HOSPADM

## 2020-09-14 RX ORDER — SUCCINYLCHOLINE CHLORIDE 20 MG/ML
INJECTION INTRAMUSCULAR; INTRAVENOUS
Status: DISCONTINUED | OUTPATIENT
Start: 2020-09-14 | End: 2020-09-14

## 2020-09-14 RX ORDER — ZOLPIDEM TARTRATE 5 MG/1
10 TABLET ORAL NIGHTLY PRN
Status: DISCONTINUED | OUTPATIENT
Start: 2020-09-14 | End: 2020-09-15 | Stop reason: HOSPADM

## 2020-09-14 RX ORDER — PHENYLEPHRINE HYDROCHLORIDE 10 MG/ML
INJECTION INTRAVENOUS
Status: DISCONTINUED | OUTPATIENT
Start: 2020-09-14 | End: 2020-09-14

## 2020-09-14 RX ORDER — SODIUM CHLORIDE 0.9 % (FLUSH) 0.9 %
3 SYRINGE (ML) INJECTION
Status: DISCONTINUED | OUTPATIENT
Start: 2020-09-14 | End: 2020-09-15 | Stop reason: HOSPADM

## 2020-09-14 RX ORDER — LIDOCAINE HYDROCHLORIDE 20 MG/ML
INJECTION INTRAVENOUS
Status: DISCONTINUED | OUTPATIENT
Start: 2020-09-14 | End: 2020-09-14

## 2020-09-14 RX ORDER — KETAMINE HCL IN 0.9 % NACL 50 MG/5 ML
SYRINGE (ML) INTRAVENOUS
Status: DISCONTINUED | OUTPATIENT
Start: 2020-09-14 | End: 2020-09-14

## 2020-09-14 RX ORDER — DIPHENHYDRAMINE HYDROCHLORIDE 50 MG/ML
25 INJECTION INTRAMUSCULAR; INTRAVENOUS EVERY 6 HOURS PRN
Status: DISCONTINUED | OUTPATIENT
Start: 2020-09-14 | End: 2020-09-15 | Stop reason: HOSPADM

## 2020-09-14 RX ORDER — NEOSTIGMINE METHYLSULFATE 0.5 MG/ML
INJECTION, SOLUTION INTRAVENOUS
Status: DISCONTINUED | OUTPATIENT
Start: 2020-09-14 | End: 2020-09-14

## 2020-09-14 RX ORDER — LIDOCAINE HYDROCHLORIDE AND EPINEPHRINE 20; 10 MG/ML; UG/ML
INJECTION, SOLUTION INFILTRATION; PERINEURAL
Status: DISCONTINUED | OUTPATIENT
Start: 2020-09-14 | End: 2020-09-15 | Stop reason: HOSPADM

## 2020-09-14 RX ORDER — ROCURONIUM BROMIDE 10 MG/ML
INJECTION, SOLUTION INTRAVENOUS
Status: DISCONTINUED | OUTPATIENT
Start: 2020-09-14 | End: 2020-09-14

## 2020-09-14 RX ORDER — PAROXETINE 10 MG/1
10 TABLET, FILM COATED ORAL DAILY
Status: DISCONTINUED | OUTPATIENT
Start: 2020-09-15 | End: 2020-09-15 | Stop reason: HOSPADM

## 2020-09-14 RX ORDER — CEFAZOLIN SODIUM 1 G/3ML
2 INJECTION, POWDER, FOR SOLUTION INTRAMUSCULAR; INTRAVENOUS
Status: COMPLETED | OUTPATIENT
Start: 2020-09-14 | End: 2020-09-14

## 2020-09-14 RX ORDER — PANTOPRAZOLE SODIUM 40 MG/1
40 TABLET, DELAYED RELEASE ORAL
Status: DISCONTINUED | OUTPATIENT
Start: 2020-09-15 | End: 2020-09-15 | Stop reason: HOSPADM

## 2020-09-14 RX ORDER — AMIODARONE HYDROCHLORIDE 200 MG/1
200 TABLET ORAL DAILY
Status: DISCONTINUED | OUTPATIENT
Start: 2020-09-15 | End: 2020-09-15 | Stop reason: HOSPADM

## 2020-09-14 RX ORDER — ETOMIDATE 2 MG/ML
INJECTION INTRAVENOUS
Status: DISCONTINUED | OUTPATIENT
Start: 2020-09-14 | End: 2020-09-14

## 2020-09-14 RX ORDER — MIDAZOLAM HYDROCHLORIDE 1 MG/ML
INJECTION, SOLUTION INTRAMUSCULAR; INTRAVENOUS
Status: DISCONTINUED | OUTPATIENT
Start: 2020-09-14 | End: 2020-09-14

## 2020-09-14 RX ORDER — TORSEMIDE 20 MG/1
20 TABLET ORAL DAILY
Status: DISCONTINUED | OUTPATIENT
Start: 2020-09-15 | End: 2020-09-15 | Stop reason: HOSPADM

## 2020-09-14 RX ORDER — ONDANSETRON 2 MG/ML
INJECTION INTRAMUSCULAR; INTRAVENOUS
Status: DISCONTINUED | OUTPATIENT
Start: 2020-09-14 | End: 2020-09-14

## 2020-09-14 RX ADMIN — ROCURONIUM BROMIDE 45 MG: 10 INJECTION, SOLUTION INTRAVENOUS at 12:09

## 2020-09-14 RX ADMIN — SODIUM CHLORIDE: 0.9 INJECTION, SOLUTION INTRAVENOUS at 12:09

## 2020-09-14 RX ADMIN — MIDAZOLAM 2 MG: 1 INJECTION INTRAMUSCULAR; INTRAVENOUS at 12:09

## 2020-09-14 RX ADMIN — ETOMIDATE 4 MG: 2 INJECTION, SOLUTION INTRAVENOUS at 02:09

## 2020-09-14 RX ADMIN — FENTANYL CITRATE 25 MCG: 50 INJECTION INTRAMUSCULAR; INTRAVENOUS at 04:09

## 2020-09-14 RX ADMIN — ONDANSETRON 4 MG: 2 INJECTION, SOLUTION INTRAMUSCULAR; INTRAVENOUS at 02:09

## 2020-09-14 RX ADMIN — ROCURONIUM BROMIDE 10 MG: 10 INJECTION, SOLUTION INTRAVENOUS at 01:09

## 2020-09-14 RX ADMIN — ACETAMINOPHEN 650 MG: 325 TABLET ORAL at 03:09

## 2020-09-14 RX ADMIN — HYDROCODONE BITARTRATE AND ACETAMINOPHEN 1 TABLET: 10; 325 TABLET ORAL at 07:09

## 2020-09-14 RX ADMIN — ETOMIDATE 20 MG: 2 INJECTION, SOLUTION INTRAVENOUS at 12:09

## 2020-09-14 RX ADMIN — FENTANYL CITRATE 25 MCG: 50 INJECTION INTRAMUSCULAR; INTRAVENOUS at 03:09

## 2020-09-14 RX ADMIN — ROCURONIUM BROMIDE 5 MG: 10 INJECTION, SOLUTION INTRAVENOUS at 12:09

## 2020-09-14 RX ADMIN — ETOMIDATE 10 MG: 2 INJECTION, SOLUTION INTRAVENOUS at 12:09

## 2020-09-14 RX ADMIN — FENTANYL CITRATE 100 MCG: 50 INJECTION INTRAMUSCULAR; INTRAVENOUS at 12:09

## 2020-09-14 RX ADMIN — SUCCINYLCHOLINE CHLORIDE 140 MG: 20 INJECTION, SOLUTION INTRAMUSCULAR; INTRAVENOUS at 12:09

## 2020-09-14 RX ADMIN — SODIUM CHLORIDE 0.2 MCG/KG/MIN: 9 INJECTION, SOLUTION INTRAVENOUS at 01:09

## 2020-09-14 RX ADMIN — CEFAZOLIN 3 G: 330 INJECTION, POWDER, FOR SOLUTION INTRAMUSCULAR; INTRAVENOUS at 01:09

## 2020-09-14 RX ADMIN — CEFAZOLIN 2 G: 1 INJECTION, POWDER, FOR SOLUTION INTRAMUSCULAR; INTRAVENOUS at 09:09

## 2020-09-14 RX ADMIN — PHENYLEPHRINE HYDROCHLORIDE 200 MCG: 10 INJECTION, SOLUTION INTRAMUSCULAR; INTRAVENOUS; SUBCUTANEOUS at 03:09

## 2020-09-14 RX ADMIN — Medication 20 MG: at 01:09

## 2020-09-14 RX ADMIN — ROCURONIUM BROMIDE 10 MG: 10 INJECTION, SOLUTION INTRAVENOUS at 02:09

## 2020-09-14 RX ADMIN — WARFARIN SODIUM 5 MG: 5 TABLET ORAL at 09:09

## 2020-09-14 RX ADMIN — SUGAMMADEX 200 MG: 100 INJECTION, SOLUTION INTRAVENOUS at 03:09

## 2020-09-14 RX ADMIN — SODIUM CHLORIDE, SODIUM GLUCONATE, SODIUM ACETATE, POTASSIUM CHLORIDE, MAGNESIUM CHLORIDE, SODIUM PHOSPHATE, DIBASIC, AND POTASSIUM PHOSPHATE: .53; .5; .37; .037; .03; .012; .00082 INJECTION, SOLUTION INTRAVENOUS at 12:09

## 2020-09-14 RX ADMIN — NEOSTIGMINE METHYLSULFATE 5 MG: 0.5 INJECTION INTRAVENOUS at 02:09

## 2020-09-14 RX ADMIN — ZOLPIDEM TARTRATE 10 MG: 5 TABLET ORAL at 09:09

## 2020-09-14 RX ADMIN — LIDOCAINE HYDROCHLORIDE 100 MG: 20 INJECTION, SOLUTION INTRAVENOUS at 12:09

## 2020-09-14 RX ADMIN — ALLOPURINOL 100 MG: 100 TABLET ORAL at 09:09

## 2020-09-14 NOTE — Clinical Note
An incision was made in the inframammary area. A pocket was created just above the fascia with sharp and blunt dissection and electrocautery.  This pocket extended to the midaxillary line. Hemostasis was ensured

## 2020-09-14 NOTE — Clinical Note
The site was marked. Prepped: left chest and left abdomen. Prepped with: ChloraPrep and Ioban. The site was clipped. The patient was draped.

## 2020-09-14 NOTE — SUBJECTIVE & OBJECTIVE
Past Medical History:   Diagnosis Date    Anticoagulant long-term use     CHF (congestive heart failure)     Dilated cardiomyopathy 1/10/2018    Disorder of kidney and ureter     CKD    Encounter for blood transfusion     Gout     HTN (hypertension)     Thyroid disease     Ventricular tachycardia (paroxysmal)        Past Surgical History:   Procedure Laterality Date    CARDIAC CATHETERIZATION  Dec. 2012    CARDIAC DEFIBRILLATOR PLACEMENT Left     CRRT-D    COLONOSCOPY N/A 3/6/2018    Procedure: COLONOSCOPY;  Surgeon: Alonso Bone MD;  Location: [facility] ENDO (2ND FLR);  Service: Endoscopy;  Laterality: N/A;    COLONOSCOPY N/A 7/17/2019    Procedure: COLONOSCOPY;  Surgeon: Blane Valdez MD;  Location: [facility] ENDO (2ND FLR);  Service: Endoscopy;  Laterality: N/A;    COLONOSCOPY N/A 7/18/2019    Procedure: COLONOSCOPY;  Surgeon: Blane Valdez MD;  Location: [facility] ENDO (2ND FLR);  Service: Endoscopy;  Laterality: N/A;    ESOPHAGOGASTRODUODENOSCOPY N/A 7/17/2019    Procedure: EGD (ESOPHAGOGASTRODUODENOSCOPY);  Surgeon: Blane Valdez MD;  Location: [facility] ENDO (2ND FLR);  Service: Endoscopy;  Laterality: N/A;    ESOPHAGOGASTRODUODENOSCOPY N/A 7/18/2019    Procedure: EGD (ESOPHAGOGASTRODUODENOSCOPY);  Surgeon: Blane Valdez MD;  Location: [facility] ENDO (2ND FLR);  Service: Endoscopy;  Laterality: N/A;    NONINVASIVE CARDIAC ELECTROPHYSIOLOGY STUDY N/A 10/18/2019    Procedure: CARDIAC ELECTROPHYSIOLOGY STUDY, NONINVASIVE;  Surgeon: Raz Wagner MD;  Location: [facility] EP LAB;  Service: Cardiology;  Laterality: N/A;  VT, DFTs, MDT CRTD in situ, LVAD, juarez MB, 3098    REPLACEMENT OF IMPLANTABLE CARDIOVERTER-DEFIBRILLATOR (ICD) GENERATOR N/A 3/9/2020    Procedure: REPLACEMENT, ICD GENERATOR;  Surgeon: Harry Yun MD;  Location: [facility] EP LAB;  Service: Cardiology;  Laterality: N/A;  VT, ICD Gen Change and Lead Revision, MDT, MAC, DM,3 Prep    REVISION OF IMPLANTABLE CARDIOVERTER-DEFIBRILLATOR (ICD) ELECTRODE LEAD PLACEMENT  N/A 3/9/2020    Procedure: REVISION, INSERTION, ELECTRODE LEAD, ICD;  Surgeon: Harry Yun MD;  Location: [facility] EP LAB;  Service: Cardiology;  Laterality: N/A;  VT, ICD Gen Change and Lead Revision, MDT, MAC, DM,3 Prep    TREATMENT OF CARDIAC ARRHYTHMIA  10/18/2019    Procedure: Cardioversion or Defibrillation;  Surgeon: Raz Wagner MD;  Location: [facility] EP LAB;  Service: Cardiology;;       Review of patient's allergies indicates:   Allergen Reactions    Lisinopril Anaphylaxis    Hydralazine analogues      Chronic constipation, impotence, dizziness       No current facility-administered medications on file prior to encounter.      Current Outpatient Medications on File Prior to Encounter   Medication Sig    allopurinol (ZYLOPRIM) 100 MG tablet TAKE 1 TABLET BY MOUTH EVERY DAY (Patient taking differently: Take 100 mg by mouth nightly. )    amiodarone (PACERONE) 200 MG Tab Take 1 tablet (200 mg total) by mouth once daily.    aspirin (ECOTRIN) 81 MG EC tablet Take 1 tablet (81 mg total) by mouth once daily.    ceFEPIme (MAXIPIME) 1 gram injection Inject 1 g into the vein every 8 (eight) hours.    doxazosin (CARDURA) 8 MG Tab Take 1 tablet (8 mg total) by mouth every 12 (twelve) hours.    guanFACINE (TENEX) 2 MG tablet Take 1 tablet (2 mg total) by mouth every evening.    levothyroxine (SYNTHROID) 50 MCG tablet Take 1 tablet (50 mcg total) by mouth before breakfast.    pantoprazole (PROTONIX) 40 MG tablet TAKE 1 TABLET (40 MG TOTAL) BY MOUTH ONCE DAILY. (Patient taking differently: Take 40 mg by mouth daily with lunch. )    potassium chloride SA (K-DUR,KLOR-CON) 20 MEQ tablet Take 1 tablet (20 mEq total) by mouth once daily.    sildenafiL (VIAGRA) 100 MG tablet Take 1 tablet (100 mg total) by mouth daily as needed for Erectile Dysfunction.    torsemide (DEMADEX) 20 MG Tab Take 1 tablet (20 mg total) by mouth once daily.    vitamin D (VITAMIN D3) 1000 units Tab Take 1,000 Units by mouth once  daily.    warfarin (COUMADIN) 5 MG tablet Take 5mg orally daily except 2.5mg orally on  /     zolpidem (AMBIEN) 10 mg Tab Take 1 tablet (10 mg total) by mouth nightly as needed.     Family History     Problem Relation (Age of Onset)    Cancer Sister (54)    Coronary artery disease Father    Diabetes Father    Heart disease Father    Hypertension Father    No Known Problems Mother, Brother        Tobacco Use    Smoking status: Former Smoker     Packs/day: 1.00     Years: 31.00     Pack years: 31.00     Types: Cigarettes     Quit date: 2018     Years since quittin.6    Smokeless tobacco: Never Used    Tobacco comment: pt is quiting on his own - pt stated not qualified for program;  pt  quit on his own   Substance and Sexual Activity    Alcohol use: No     Alcohol/week: 0.0 standard drinks     Frequency: Never     Drinks per session: Patient refused     Binge frequency: Never     Comment: one fifth of gin or rum/week    Drug use: No    Sexual activity: Yes     Partners: Female     Birth control/protection: None     Comment: 10/5/17  with same partner 7 years      Review of Systems   Constitution: Negative for decreased appetite, fever, malaise/fatigue and weight gain.   HENT: Negative for congestion, hearing loss and nosebleeds.    Eyes: Negative for visual disturbance.   Cardiovascular: Negative for chest pain, dyspnea on exertion, irregular heartbeat, leg swelling, palpitations and syncope.   Respiratory: Negative for cough, shortness of breath and sleep disturbances due to breathing.    Endocrine: Negative for cold intolerance and heat intolerance.   Hematologic/Lymphatic: Negative for bleeding problem. Does not bruise/bleed easily.   Gastrointestinal: Negative for bloating, abdominal pain, change in bowel habit and heartburn.   Neurological: Negative for dizziness, loss of balance and numbness.   Psychiatric/Behavioral: Negative for altered mental status. The patient is  not nervous/anxious.      Objective:     Vital Signs (Most Recent):    Vital Signs (24h Range):             There is no height or weight on file to calculate BMI.            Physical Exam   Constitutional: He is oriented to person, place, and time. Vital signs are normal. He appears well-developed and well-nourished.   Cardiovascular: Normal rate, regular rhythm, S1 normal, S2 normal, normal heart sounds and intact distal pulses. PMI is not displaced. Exam reveals no gallop and no friction rub.   No murmur heard.  Pulses:       Radial pulses are 1+ on the right side and 1+ on the left side.   LVAD hum    Pulmonary/Chest: Effort normal and breath sounds normal. No accessory muscle usage. He has no decreased breath sounds. He has no wheezes.   Abdominal: Soft. Normal appearance and bowel sounds are normal. He exhibits no distension, no fluid wave and no ascites. There is no hepatosplenomegaly. There is no abdominal tenderness.   Musculoskeletal: Normal range of motion.         General: No edema.   Neurological: He is alert and oriented to person, place, and time. He has normal strength.   Skin: Skin is warm, dry and intact. No ecchymosis and no rash noted. No erythema.   Psychiatric: He has a normal mood and affect. His speech is normal and behavior is normal. Judgment and thought content normal. Cognition and memory are normal.   Vitals reviewed.

## 2020-09-14 NOTE — ANESTHESIA PROCEDURE NOTES
Intubation  Performed by: Dandre Valentine CRNA  Authorized by: Rashad Yarbrough MD     Intubation:     Induction:  Intravenous    Intubated:  Postinduction    Mask Ventilation:  Easy mask    Attempts:  1    Attempted By:  CRNA    Method of Intubation:  Direct    Blade:  Barrios 2    Laryngeal View Grade: Grade I - full view of chords      Difficult Airway Encountered?: No      Complications:  None    Airway Device:  Oral endotracheal tube    Airway Device Size:  8.0    Style/Cuff Inflation:  Cuffed (inflated to minimal occlusive pressure)    Tube secured:  23    Secured at:  The teeth    Placement Verified By:  Capnometry    Complicating Factors:  None    Findings Post-Intubation:  BS equal bilateral

## 2020-09-14 NOTE — ASSESSMENT & PLAN NOTE
- Prior to procedure, Dr. Yun and Mr. Richards discussed the alternatives, benefits and risks of the procedure including possibility of infection, death, stroke, medication reaction, MI, and bleeding.   Orestes Richards voices understanding and all questions have been answered. Consent obtained.   - Last dose of warfarin last evening. INR therapeutic.    - Proceed with subcutaneous ICD implantation   - Anesthesia for sedation

## 2020-09-14 NOTE — Clinical Note
After measuring the length of the electrode, the lead was tunneled into the superior parasternal position.

## 2020-09-14 NOTE — NURSING TRANSFER
Nursing Transfer Note      9/14/2020     Transfer To: 306    Transfer via stretcher    Transfer with transport monitor, controller x2, battery x4, gina clip x4,  at bedside    Transported by RN    Medicines sent: n/a    Chart send with patient: Yes    Notified: spouse    Patient reassessed at: 9/14/20 @ 3287

## 2020-09-14 NOTE — ANESTHESIA PROCEDURE NOTES
Arterial    Diagnosis: cardiomyopathy    Patient location during procedure: done in OR  Procedure start time: 9/14/2020 12:53 PM  Timeout: 9/14/2020 12:52 PM  Procedure end time: 9/14/2020 12:53 PM    Staffing  Authorizing Provider: Rashad Yarbrough MD  Performing Provider: Rashad Yarbrough MD    Anesthesiologist was present at the time of the procedure.    Preanesthetic Checklist  Completed: patient identified, site marked, surgical consent, pre-op evaluation, timeout performed, IV checked, risks and benefits discussed, monitors and equipment checked and anesthesia consent givenArterial  Skin Prep: chlorhexidine gluconate  Local Infiltration: none  Orientation: right  Location: radial  Catheter Size: 20 G  Catheter placement by Ultrasound guidance. Heme positive aspiration all ports.  Vessel Caliber: medium, patent, compressibility normal  Needle advanced into vessel with real time Ultrasound guidance.Insertion Attempts: 1  Assessment  Dressing: secured with tape and tegaderm  Patient: Tolerated well

## 2020-09-14 NOTE — HPI
Tim Richards presents to short stay cardiac unit on 09/14/2020 for planned subcutaneous ICD with Dr. Yun.     Mr. Richards is a 54 y.o. male with nonischemic cardiomyopathy with severely reduced LVEF s/p ICD (2014), s/p LVAD HM II implant (3/2018), and history of VT and VF.  Most recently, Mr. Richards underwent ICD generator change and ICD lead revision on 03/09/20.  Unfortunately, generator change and lead revision resulted in ICD pocket infection and required admission.  On 07/10/20, Mr. Richards underwent device extraction with residual left chest wall pocket hematoma that required PRBC transfusion.  During this admission, Mr. Richards was also evaluated for S-ICD.  At time of discharge, Mr. Richards was discharged with LifeVest and IV antibiotics.     Mr. [place] then [place] [facility] [place] for further evaluation of S-ICD.  At that point he was doing well with hematoma resolving and completion of IV antibiotics.  No LifeVest therapies deployed.  In the setting of high infection risk, Mr. Richards elected to proceed with  S-ICD implant in order to avoid placing intracardiac leads.     Today, Mr. Richards reports feeling well. He denies any bleeding, overt shortness of breath, chest pains, fevers, chills, or any other acute symptoms.  Continues to wear LifeVest without any alarms or therapies. Review of most recent labs (09/11/20) with improved renal function, Cr 1.7. WBC 3.9, Hgb 11.9, and Plt 246.  INR therapeutic.       EKG:   My independent visualization of most recent EKG is sinus rhythm with first degree AVB at 73 bpm

## 2020-09-14 NOTE — ANESTHESIA PREPROCEDURE EVALUATION
09/14/2020  Pre-operative evaluation for Procedure(s) (LRB):  Sub Q, ICD (N/A)    Tim Richards is a 54 y.o. male here for subQ ICD s/p LVAD    Patient Active Problem List   Diagnosis    Cigarette nicotine dependence without complication    Alcohol abuse    Pulmonary nodule seen on imaging study    Chronic combined systolic and diastolic heart failure    High risk medications (amiodarone) long-term use    CKD (chronic kidney disease), stage III    Hypertensive cardiovascular-renal disease, stage 1-4 or unspecified chronic kidney disease, with heart failure    LVAD (left ventricular assist device) present    Hyperglycemia    Hyperbilirubinemia    Anemia    Hypertension    Anticoagulation monitoring, INR range 2-3    Left ventricular assist device (LVAD) complication    Pre-transplant evaluation for heart transplant    SOB (shortness of breath)    GIB (gastrointestinal bleeding)    Leukocytosis    Thrombocytopenia, unspecified    GI bleed    History of bleeding ulcers    Shortness of breath    VT (ventricular tachycardia)    Severe obesity (BMI 35.0-39.9) with comorbidity    Palpitations    Amiodarone-induced hyperthyroidism    Heart replaced by heart assist device    Presence of left ventricular assist device (LVAD)    Hyperthyroidism    Substance or medication-induced sleep disorder, insomnia type    VF (ventricular fibrillation)    Elevated serum lactate dehydrogenase (LDH)    Essential hypertension    CHICHI (obstructive sleep apnea)    ICD (implantable cardioverter-defibrillator) infection    Infection involving implantable cardioverter-defibrillator (ICD)    Other specified hypothyroidism    Gout    NICM (nonischemic cardiomyopathy)    Acute blood loss anemia    Hypotension due to hypovolemia    PICC (peripherally inserted central catheter) removal       Review  of patient's allergies indicates:   Allergen Reactions    Lisinopril Anaphylaxis    Hydralazine analogues      Chronic constipation, impotence, dizziness       No current facility-administered medications on file prior to encounter.      Current Outpatient Medications on File Prior to Encounter   Medication Sig Dispense Refill    allopurinol (ZYLOPRIM) 100 MG tablet TAKE 1 TABLET BY MOUTH EVERY DAY (Patient taking differently: Take 100 mg by mouth nightly. ) 30 tablet 5    amiodarone (PACERONE) 200 MG Tab Take 1 tablet (200 mg total) by mouth once daily. 30 tablet 11    aspirin (ECOTRIN) 81 MG EC tablet Take 1 tablet (81 mg total) by mouth once daily. 100 tablet 3    ceFEPIme (MAXIPIME) 1 gram injection Inject 1 g into the vein every 8 (eight) hours.      doxazosin (CARDURA) 8 MG Tab Take 1 tablet (8 mg total) by mouth every 12 (twelve) hours. 60 tablet 11    guanFACINE (TENEX) 2 MG tablet Take 1 tablet (2 mg total) by mouth every evening. 30 tablet 11    levothyroxine (SYNTHROID) 50 MCG tablet Take 1 tablet (50 mcg total) by mouth before breakfast. 90 tablet 4    pantoprazole (PROTONIX) 40 MG tablet TAKE 1 TABLET (40 MG TOTAL) BY MOUTH ONCE DAILY. (Patient taking differently: Take 40 mg by mouth daily with lunch. ) 90 tablet 3    potassium chloride SA (K-DUR,KLOR-CON) 20 MEQ tablet Take 1 tablet (20 mEq total) by mouth once daily. 30 tablet 11    sildenafiL (VIAGRA) 100 MG tablet Take 1 tablet (100 mg total) by mouth daily as needed for Erectile Dysfunction. 10 tablet 1    torsemide (DEMADEX) 20 MG Tab Take 1 tablet (20 mg total) by mouth once daily. 30 tablet 11    vitamin D (VITAMIN D3) 1000 units Tab Take 1,000 Units by mouth once daily.      warfarin (COUMADIN) 5 MG tablet Take 5mg orally daily except 2.5mg orally on Tuesdays / Fridays 30 tablet 11    zolpidem (AMBIEN) 10 mg Tab Take 1 tablet (10 mg total) by mouth nightly as needed. 30 tablet 5       Past Surgical History:   Procedure  Laterality Date    CARDIAC CATHETERIZATION  Dec. 2012    CARDIAC DEFIBRILLATOR PLACEMENT Left     CRRT-D    COLONOSCOPY N/A 3/6/2018    Procedure: COLONOSCOPY;  Surgeon: Alonso Bone MD;  Location: [facility] ENDO (2ND FLR);  Service: Endoscopy;  Laterality: N/A;    COLONOSCOPY N/A 7/17/2019    Procedure: COLONOSCOPY;  Surgeon: Blane Valdez MD;  Location: [facility] ENDO (2ND FLR);  Service: Endoscopy;  Laterality: N/A;    COLONOSCOPY N/A 7/18/2019    Procedure: COLONOSCOPY;  Surgeon: Blane Valdez MD;  Location: [facility] ENDO (2ND FLR);  Service: Endoscopy;  Laterality: N/A;    ESOPHAGOGASTRODUODENOSCOPY N/A 7/17/2019    Procedure: EGD (ESOPHAGOGASTRODUODENOSCOPY);  Surgeon: Blane Valdez MD;  Location: [facility] ENDO (2ND FLR);  Service: Endoscopy;  Laterality: N/A;    ESOPHAGOGASTRODUODENOSCOPY N/A 7/18/2019    Procedure: EGD (ESOPHAGOGASTRODUODENOSCOPY);  Surgeon: Blane Valdez MD;  Location: [facility] (2ND FLR);  Service: Endoscopy;  Laterality: N/A;    NONINVASIVE CARDIAC ELECTROPHYSIOLOGY STUDY N/A 10/18/2019    Procedure: CARDIAC ELECTROPHYSIOLOGY STUDY, NONINVASIVE;  Surgeon: Raz Wagner MD;  Location: [facility] EP LAB;  Service: Cardiology;  Laterality: N/A;  VT, DFTs, MDT CRTD in situ, LVAD, anes, MB, 3098    REPLACEMENT OF IMPLANTABLE CARDIOVERTER-DEFIBRILLATOR (ICD) GENERATOR N/A 3/9/2020    Procedure: REPLACEMENT, ICD GENERATOR;  Surgeon: Harry Yun MD;  Location: [facility] EP LAB;  Service: Cardiology;  Laterality: N/A;  VT, ICD Gen Change and Lead Revision, MDT, MAC, DM,3 Prep    REVISION OF IMPLANTABLE CARDIOVERTER-DEFIBRILLATOR (ICD) ELECTRODE LEAD PLACEMENT N/A 3/9/2020    Procedure: REVISION, INSERTION, ELECTRODE LEAD, ICD;  Surgeon: Harry Yun MD;  Location: [facility] EP LAB;  Service: Cardiology;  Laterality: N/A;  VT, ICD Gen Change and Lead Revision, MDT, MAC, DM,3 Prep    TREATMENT OF CARDIAC ARRHYTHMIA  10/18/2019    Procedure: Cardioversion or Defibrillation;  Surgeon: Raz Wagner MD;  Location:  [facility];  Service: Cardiology;;       Social History     Socioeconomic History    Marital status:      Spouse name: Not on file    Number of children: Not on file    Years of education: Not on file    Highest education level: Not on file   Occupational History     Employer: [employer]    Social Needs    Financial resource strain: Not hard at all    Food insecurity     Worry: Never true     Inability: Never true    Transportation needs     Medical: No     Non-medical: No   Tobacco Use    Smoking status: Former Smoker     Packs/day: 1.00     Years: 31.00     Pack years: 31.00     Types: Cigarettes     Quit date: 2018     Years since quittin.6    Smokeless tobacco: Never Used    Tobacco comment: pt is quiting on his own - pt stated not qualified for program;  pt  quit on his own   Substance and Sexual Activity    Alcohol use: No     Alcohol/week: 0.0 standard drinks     Frequency: Never     Drinks per session: Patient refused     Binge frequency: Never     Comment: one fifth of gin or rum/week    Drug use: No    Sexual activity: Yes     Partners: Female     Birth control/protection: None     Comment: 10/5/17  with same partner 7 years    Lifestyle    Physical activity     Days per week: Not on file     Minutes per session: 60 min    Stress: Very much   Relationships    Social connections     Talks on phone: More than three times a week     Gets together: More than three times a week     Attends Rastafari service: Not on file     Active member of club or organization: Yes     Attends meetings of clubs or organizations: More than 4 times per year     Relationship status:    Other Topics Concern    Not on file   Social History Narrative    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 PA Systolic Pressure 33.6     Mitral Valve Mobility NORMAL     Tricuspid Valve Regurgitation MILD          Anesthesia Evaluation    I have reviewed the Patient Summary Reports.    I have reviewed the Nursing Notes. I have reviewed the NPO Status.      Review of Systems  Anesthesia Hx:  No problems with previous Anesthesia    Cardiovascular:   Hypertension CHF    Pulmonary:   Sleep Apnea    Renal/:   Chronic Renal Disease, CRI    Neurological:  Neurology Normal    Endocrine:   Hypothyroidism        Physical Exam  General:  Well nourished    Airway/Jaw/Neck:  Airway Findings: Mouth Opening: Normal Tongue: Normal  Mallampati: II  TM Distance: Normal, at least 6 cm       Chest/Lungs:  Chest/Lungs Findings: Clear to auscultation, Normal Respiratory Rate     Heart/Vascular:  Heart Findings: Rate: Normal             Anesthesia Plan  Type of Anesthesia, risks & benefits discussed:  Anesthesia Type:  general  Patient's Preference:   Intra-op Monitoring Plan: standard ASA monitors and arterial line  Intra-op Monitoring Plan Comments:   Post Op Pain Control Plan: multimodal analgesia and IV/PO Opioids PRN  Post Op Pain Control Plan Comments:   Induction:   IV  Beta Blocker:         Informed Consent: Patient understands risks and agrees with Anesthesia plan.  Questions answered. Anesthesia consent signed with patient.  ASA Score: 4     Day of Surgery Review of History & Physical:            Ready For Surgery From Anesthesia Perspective.

## 2020-09-14 NOTE — Clinical Note
The skin in the left superior parasternal. Xyphoid, and left mid axillary/sub mammary areas was infiltrated with local anesthetic

## 2020-09-14 NOTE — TRANSFER OF CARE
"Anesthesia Transfer of Care Note    Patient: Tim Richards    Procedure(s) Performed: Procedure(s) (LRB):  Sub Q, ICD (N/A)    Patient location: PACU    Anesthesia Type: general    Transport from OR: Transported from OR on 6-10 L/min O2 by face mask with adequate spontaneous ventilation    Post pain: adequate analgesia    Post assessment: no apparent anesthetic complications and tolerated procedure well    Post vital signs: stable    Level of consciousness: awake, alert and oriented    Nausea/Vomiting: no nausea/vomiting    Complications: none    Transfer of care protocol was followedComments: Dr. Yarbrough at    Aware of restart of Phenylephrine infusion      Last vitals:   Visit Vitals  [place] MAP 65  /87   Pulse 98   Temp 36.7 °C (98 °F) (Temporal)   Resp 20   Ht 6' 1" (1.854 m)   Wt 126.1 kg (278 lb)   SpO2 100   BMI 36.68 kg/m²     "

## 2020-09-14 NOTE — H&P
[facility] - Short Stay Cardiac Unit  Cardiac Electrophysiology  History and Physical     Admission Date: 9/14/2020  Code Status: Prior   Attending Provider: Harry Yun MD   Principal Problem:<principal problem not specified>    Subjective:     Chief Complaint:  NICM, s/p ICD gen change c/b pocket infection      HPI:  Tim Richards presents to short stay cardiac unit on 09/14/2020 for planned subcutaneous ICD with Dr. Yun.     Mr. Richards is a 54 y.o. male with nonischemic cardiomyopathy with severely reduced LVEF s/p ICD (2014), s/p LVAD HM II implant (3/2018), and history of VT and VF.  Most recently, Mr. Richards underwent ICD generator change and ICD lead revision on 03/09/20.  Unfortunately, generator change and lead revision resulted in ICD pocket infection and required admission.  On 07/10/20, Mr. Richards underwent device extraction with residual left chest wall pocket hematoma that required PRBC transfusion.  During this admission, Mr. Richards was also evaluated for S-ICD.  At time of discharge, Mr. Richards was discharged with LifeVest and IV antibiotics.     Mr. Richards then presented to [facility] [place] on 08/07/20 for further evaluation of S-ICD.  At that point he was doing well with hematoma resolving and completion of IV antibiotics.  No LifeVest therapies deployed.  In the setting of high infection risk, Mr. Richards elected to proceed with  S-ICD implant in order to avoid placing intracardiac leads.     Today, Mr. Richards reports feeling well. He denies any bleeding, overt shortness of breath, chest pains, fevers, chills, or any other acute symptoms.  Continues to wear LifeVest without any alarms or therapies. Review of most recent labs (09/11/20) with improved renal function, Cr 1.7. WBC 3.9, Hgb 11.9, and Plt 246.  INR therapeutic.       EKG:   My independent visualization of most recent EKG is sinus rhythm with first degree AVB at 73 bpm     Past Medical History:   Diagnosis Date     Anticoagulant long-term use     CHF (congestive heart failure)     Dilated cardiomyopathy 1/10/2018    Disorder of kidney and ureter     CKD    Encounter for blood transfusion     Gout     HTN (hypertension)     Thyroid disease     Ventricular tachycardia (paroxysmal)        Past Surgical History:   Procedure Laterality Date    CARDIAC CATHETERIZATION  Dec. 2012    CARDIAC DEFIBRILLATOR PLACEMENT Left     CRRT-D    COLONOSCOPY N/A 3/6/2018    Procedure: COLONOSCOPY;  Surgeon: Alonso Bone MD;  Location: [facility] ENDO (2ND FLR);  Service: Endoscopy;  Laterality: N/A;    COLONOSCOPY N/A 7/17/2019    Procedure: COLONOSCOPY;  Surgeon: Blane Valdez MD;  Location: [facility] ENDO (2ND FLR);  Service: Endoscopy;  Laterality: N/A;    COLONOSCOPY N/A 7/18/2019    Procedure: COLONOSCOPY;  Surgeon: Blane Valdez MD;  Location: [facility] ENDO (2ND FLR);  Service: Endoscopy;  Laterality: N/A;    ESOPHAGOGASTRODUODENOSCOPY N/A 7/17/2019    Procedure: EGD (ESOPHAGOGASTRODUODENOSCOPY);  Surgeon: Blane Valdez MD;  Location: [facility] ENDO (2ND FLR);  Service: Endoscopy;  Laterality: N/A;    ESOPHAGOGASTRODUODENOSCOPY N/A 7/18/2019    Procedure: EGD (ESOPHAGOGASTRODUODENOSCOPY);  Surgeon: Blane Valdez MD;  Location: [facility] ENDO (2ND FLR);  Service: Endoscopy;  Laterality: N/A;    NONINVASIVE CARDIAC ELECTROPHYSIOLOGY STUDY N/A 10/18/2019    Procedure: CARDIAC ELECTROPHYSIOLOGY STUDY, NONINVASIVE;  Surgeon: Raz Wagner MD;  Location: [facility] EP LAB;  Service: Cardiology;  Laterality: N/A;  VT, DFTs, MDT CRTD in situ, LVAD, anes, MB, 3098    REPLACEMENT OF IMPLANTABLE CARDIOVERTER-DEFIBRILLATOR (ICD) GENERATOR N/A 3/9/2020    Procedure: REPLACEMENT, ICD GENERATOR;  Surgeon: Harry Yun MD;  Location: [facility] EP LAB;  Service: Cardiology;  Laterality: N/A;  VT, ICD Gen Change and Lead Revision, MDT, MAC, DM,3 Prep    REVISION OF IMPLANTABLE CARDIOVERTER-DEFIBRILLATOR (ICD) ELECTRODE LEAD PLACEMENT N/A 3/9/2020    Procedure: REVISION,  INSERTION, ELECTRODE LEAD, ICD;  Surgeon: Harry Yun MD;  Location: [facility] EP LAB;  Service: Cardiology;  Laterality: N/A;  VT, ICD Gen Change and Lead Revision, MDT, MAC, DM,3 Prep    TREATMENT OF CARDIAC ARRHYTHMIA  10/18/2019    Procedure: Cardioversion or Defibrillation;  Surgeon: Raz Wagner MD;  Location: [facility] EP LAB;  Service: Cardiology;;       Review of patient's allergies indicates:   Allergen Reactions    Lisinopril Anaphylaxis    Hydralazine analogues      Chronic constipation, impotence, dizziness       No current facility-administered medications on file prior to encounter.      Current Outpatient Medications on File Prior to Encounter   Medication Sig    allopurinol (ZYLOPRIM) 100 MG tablet TAKE 1 TABLET BY MOUTH EVERY DAY (Patient taking differently: Take 100 mg by mouth nightly. )    amiodarone (PACERONE) 200 MG Tab Take 1 tablet (200 mg total) by mouth once daily.    aspirin (ECOTRIN) 81 MG EC tablet Take 1 tablet (81 mg total) by mouth once daily.    ceFEPIme (MAXIPIME) 1 gram injection Inject 1 g into the vein every 8 (eight) hours.    doxazosin (CARDURA) 8 MG Tab Take 1 tablet (8 mg total) by mouth every 12 (twelve) hours.    guanFACINE (TENEX) 2 MG tablet Take 1 tablet (2 mg total) by mouth every evening.    levothyroxine (SYNTHROID) 50 MCG tablet Take 1 tablet (50 mcg total) by mouth before breakfast.    pantoprazole (PROTONIX) 40 MG tablet TAKE 1 TABLET (40 MG TOTAL) BY MOUTH ONCE DAILY. (Patient taking differently: Take 40 mg by mouth daily with lunch. )    potassium chloride SA (K-DUR,KLOR-CON) 20 MEQ tablet Take 1 tablet (20 mEq total) by mouth once daily.    sildenafiL (VIAGRA) 100 MG tablet Take 1 tablet (100 mg total) by mouth daily as needed for Erectile Dysfunction.    torsemide (DEMADEX) 20 MG Tab Take 1 tablet (20 mg total) by mouth once daily.    vitamin D (VITAMIN D3) 1000 units Tab Take 1,000 Units by mouth once daily.    warfarin (COUMADIN) 5 MG  tablet Take 5mg orally daily except 2.5mg orally on  /     zolpidem (AMBIEN) 10 mg Tab Take 1 tablet (10 mg total) by mouth nightly as needed.     Family History     Problem Relation (Age of Onset)    Cancer Sister (54)    Coronary artery disease Father    Diabetes Father    Heart disease Father    Hypertension Father    No Known Problems Mother, Brother        Tobacco Use    Smoking status: Former Smoker     Packs/day: 1.00     Years: 31.00     Pack years: 31.00     Types: Cigarettes     Quit date: 2018     Years since quittin.6    Smokeless tobacco: Never Used    Tobacco comment: pt is quiting on his own - pt stated not qualified for program;  pt  quit on his own   Substance and Sexual Activity    Alcohol use: No     Alcohol/week: 0.0 standard drinks     Frequency: Never     Drinks per session: Patient refused     Binge frequency: Never     Comment: one fifth of gin or rum/week    Drug use: No    Sexual activity: Yes     Partners: Female     Birth control/protection: None     Comment: 10/5/17  with same partner 7 years      Review of Systems   Constitution: Negative for decreased appetite, fever, malaise/fatigue and weight gain.   HENT: Negative for congestion, hearing loss and nosebleeds.    Eyes: Negative for visual disturbance.   Cardiovascular: Negative for chest pain, dyspnea on exertion, irregular heartbeat, leg swelling, palpitations and syncope.   Respiratory: Negative for cough, shortness of breath and sleep disturbances due to breathing.    Endocrine: Negative for cold intolerance and heat intolerance.   Hematologic/Lymphatic: Negative for bleeding problem. Does not bruise/bleed easily.   Gastrointestinal: Negative for bloating, abdominal pain, change in bowel habit and heartburn.   Neurological: Negative for dizziness, loss of balance and numbness.   Psychiatric/Behavioral: Negative for altered mental status. The patient is not nervous/anxious.      Objective:      Vital Signs (Most Recent):    Vital Signs (24h Range):             There is no height or weight on file to calculate BMI.            Physical Exam   Constitutional: He is oriented to person, place, and time. Vital signs are normal. He appears well-developed and well-nourished.   Cardiovascular: Normal rate, regular rhythm, S1 normal, S2 normal, normal heart sounds and intact distal pulses. PMI is not displaced. Exam reveals no gallop and no friction rub.   No murmur heard.  Pulses:       Radial pulses are 1+ on the right side and 1+ on the left side.   LVAD hum    Pulmonary/Chest: Effort normal and breath sounds normal. No accessory muscle usage. He has no decreased breath sounds. He has no wheezes.   Abdominal: Soft. Normal appearance and bowel sounds are normal. He exhibits no distension, no fluid wave and no ascites. There is no hepatosplenomegaly. There is no abdominal tenderness.   Musculoskeletal: Normal range of motion.         General: No edema.   Neurological: He is alert and oriented to person, place, and time. He has normal strength.   Skin: Skin is warm, dry and intact. No ecchymosis and no rash noted. No erythema.   Psychiatric: He has a normal mood and affect. His speech is normal and behavior is normal. Judgment and thought content normal. Cognition and memory are normal.   Vitals reviewed.        Assessment and Plan:     NICM (nonischemic cardiomyopathy)  - Prior to procedure, Dr. Yun and Mr. Richards discussed the alternatives, benefits and risks of the procedure including possibility of infection, death, stroke, medication reaction, MI, and bleeding.   Mr. Richards voices understanding and all questions have been answered. Consent obtained.   - Last dose of warfarin last evening. INR therapeutic.    - Proceed with subcutaneous ICD implantation   - Anesthesia for sedation         Jossy Plata NP  Cardiac Electrophysiology  [facility] - Short Stay Cardiac Unit

## 2020-09-14 NOTE — Clinical Note
[facility] rep concerned about preliminary markings and called Dr. Yun back to procedure room for verification of preliminary markings.

## 2020-09-14 NOTE — Clinical Note
The lead was tunneled from the ICD pocket to the subxiphoid pocket.  The lead was sutured to the fascia at the subxiphoid area using a suture sleeve.

## 2020-09-15 ENCOUNTER — DOCUMENT SCAN (OUTPATIENT)
Dept: HOME HEALTH SERVICES | Facility: HOSPITAL | Age: 55
End: 2020-09-15
Payer: COMMERCIAL

## 2020-09-15 VITALS
HEIGHT: 73 IN | RESPIRATION RATE: 18 BRPM | WEIGHT: 278 LBS | TEMPERATURE: 98 F | BODY MASS INDEX: 36.84 KG/M2 | HEART RATE: 56 BPM | OXYGEN SATURATION: 99 % | SYSTOLIC BLOOD PRESSURE: 72 MMHG

## 2020-09-15 DIAGNOSIS — Z95.811 LVAD (LEFT VENTRICULAR ASSIST DEVICE) PRESENT: Primary | ICD-10-CM

## 2020-09-15 LAB
COTININE SERPL-MCNC: <3 NG/ML
NICOTINE SERPL-MCNC: <3 NG/ML

## 2020-09-15 PROCEDURE — 63600175 PHARM REV CODE 636 W HCPCS: Performed by: NURSE PRACTITIONER

## 2020-09-15 PROCEDURE — 27000248 HC VAD-ADDITIONAL DAY

## 2020-09-15 PROCEDURE — 25000003 PHARM REV CODE 250: Performed by: NURSE PRACTITIONER

## 2020-09-15 RX ORDER — HYDROCODONE BITARTRATE AND ACETAMINOPHEN 10; 325 MG/1; MG/1
1 TABLET ORAL ONCE
Status: COMPLETED | OUTPATIENT
Start: 2020-09-15 | End: 2020-09-15

## 2020-09-15 RX ORDER — CEPHALEXIN 500 MG/1
500 CAPSULE ORAL EVERY 8 HOURS
Qty: 15 CAPSULE | Refills: 0 | Status: SHIPPED | OUTPATIENT
Start: 2020-09-15 | End: 2020-09-20

## 2020-09-15 RX ADMIN — ACETAMINOPHEN 650 MG: 325 TABLET ORAL at 08:09

## 2020-09-15 RX ADMIN — PAROXETINE HYDROCHLORIDE 10 MG: 10 TABLET, FILM COATED ORAL at 08:09

## 2020-09-15 RX ADMIN — PANTOPRAZOLE SODIUM 40 MG: 40 TABLET, DELAYED RELEASE ORAL at 11:09

## 2020-09-15 RX ADMIN — TORSEMIDE 20 MG: 20 TABLET ORAL at 08:09

## 2020-09-15 RX ADMIN — CEFAZOLIN 2 G: 1 INJECTION, POWDER, FOR SOLUTION INTRAMUSCULAR; INTRAVENOUS at 05:09

## 2020-09-15 RX ADMIN — HYDROCODONE BITARTRATE AND ACETAMINOPHEN 1 TABLET: 10; 325 TABLET ORAL at 11:09

## 2020-09-15 RX ADMIN — AMIODARONE HYDROCHLORIDE 200 MG: 200 TABLET ORAL at 08:09

## 2020-09-15 RX ADMIN — POTASSIUM CHLORIDE 20 MEQ: 1500 TABLET, EXTENDED RELEASE ORAL at 08:09

## 2020-09-15 RX ADMIN — LEVOTHYROXINE SODIUM 50 MCG: 50 TABLET ORAL at 05:09

## 2020-09-15 NOTE — ASSESSMENT & PLAN NOTE
s/p subcutaneous ICD     Plan:  - Keflex 500 mg TID x 5 days for surgical prophylaxis  - Resume PTA medications   - Follow up in device clinic in 1 week (9/24/20)  - Follow up with Annmarie Loyola in 3 months   - Discharge plans/instructions discussed with patient who verbalized understanding and agreement of plans of care.

## 2020-09-15 NOTE — NURSING
PIV X 2 removed. Telemetry removed. AVS provided and reviewed with pt and wife. Both verbalized understanding. All questions answered at this time. Pt brought to ride via wheelchair.

## 2020-09-15 NOTE — HOSPITAL COURSE
Mr. Richards underwent S-ICD implantation.  He tolerated the procedure well and there were no acute complications. Left midaxillary site CDI.  No bleeding or hematoma noted. Aquacel dressing in place.     Post-procedure CXR stable without evidence of pneumothorax.  ECG and tele reviewed without evidence of arrhythmias. No LVAD alarms overnight.  Completed intra op abx, plan to discharge home on Keflex 500 mg TID x 5 days for surgical prophylaxis.     Plan of care and discharge instructions discussed with Mr. Richards and his wife.  Voiced understanding and all questions answered. Mr. Richards was discharged home in stable condition.

## 2020-09-15 NOTE — DISCHARGE SUMMARY
[facility]  Cardiac Electrophysiology  Discharge Summary      Patient Name: Tim Richards  MRN: [id number]  Admission Date: 9/14/2020  Hospital Length of Stay: 0 days  Discharge Date and Time:  09/15/2020 8:40 AM  Attending Physician: Harry Yun MD    Discharging Provider: Jossy Plata NP  Primary Care Physician: Power Connors MD      HPI:   Tim Richards presents to short stay cardiac unit on 09/14/2020 for planned subcutaneous ICD with Dr. Yun.     Mr. Richards is a 54 y.o. male with nonischemic cardiomyopathy with severely reduced LVEF s/p ICD (2014), s/p LVAD HM II implant (3/2018), and history of VT and VF.  Most recently, Mr. Richards underwent ICD generator change and ICD lead revision on 03/09/20.  Unfortunately, generator change and lead revision resulted in ICD pocket infection and required admission.  On 07/10/20, Mr. Richards underwent device extraction with residual left chest wall pocket hematoma that required PRBC transfusion.  During this admission, Mr. Richards was also evaluated for S-ICD.  At time of discharge, Mr. Richards was discharged with LifeVest and IV antibiotics.     Mr. Richards then presented to [facility] arrhythmia clinic on 08/07/20 for further evaluation of S-ICD.  At that point he was doing well with hematoma resolving and completion of IV antibiotics.  No LifeVest therapies deployed.  In the setting of high infection risk, Mr. Richards elected to proceed with  S-ICD implant in order to avoid placing intracardiac leads.     Today, Mr. Richards reports feeling well. He denies any bleeding, overt shortness of breath, chest pains, fevers, chills, or any other acute symptoms.  Continues to wear LifeVest without any alarms or therapies. Review of most recent labs (09/11/20) with improved renal function, Cr 1.7. WBC 3.9, Hgb 11.9, and Plt 246.  INR therapeutic.       EKG:   My independent visualization of most recent EKG is sinus rhythm with first degree AVB at 73 bpm      Procedure(s) (LRB):  Sub Q, ICD (N/A)     Indwelling Lines/Drains at time of discharge:  Lines/Drains/Airways     Peripherally Inserted Central Catheter Line            PICC Double Lumen 07/06/20 1642 right basilic 70 days          Line                 VAD 03/08/18 1124 Left ventricular assist device HeartMate  days            Hospital Course:  Mr. Richards underwent S-ICD implantation.  He tolerated the procedure well and there were no acute complications. Left midaxillary site CDI.  No bleeding or hematoma noted. Aquacel dressing in place.     Post-procedure CXR stable without evidence of pneumothorax.  ECG and tele reviewed without evidence of arrhythmias. No LVAD alarms overnight.  Completed intra op abx, plan to discharge home on Keflex 500 mg TID x 5 days for surgical prophylaxis.     Plan of care and discharge instructions discussed with Mr. Richards and his wife.  Voiced understanding and all questions answered. Mr. Richards was discharged home in stable condition.     Consults: Anesthesia     Pending Diagnostic Studies:     None          Final Active Diagnoses:    Diagnosis Date Noted POA    NICM (nonischemic cardiomyopathy) [I42.8]  Yes     Chronic    VF (ventricular fibrillation) [I49.01] 03/09/2020 Yes      Problems Resolved During this Admission:     NICM (nonischemic cardiomyopathy)  s/p subcutaneous ICD     Plan:  - Keflex 500 mg TID x 5 days for surgical prophylaxis  - Resume PTA medications   - Follow up in device clinic in 1 week (9/24/20)  - Follow up with Annmarie Loyola in 3 months   - Discharge plans/instructions discussed with patient who verbalized understanding and agreement of plans of care.          Discharged Condition: good    Disposition: Home or Self Care    Follow Up:  Follow-up Information     Annmarie Loyola NP In 3 months.    Specialty: Cardiology  Why: s/p SQ ICD  Contact information:  [facility] [address]  [phone]                 Patient  "Instructions:      Diet Adult Regular     Other restrictions (specify):   Order Comments: Do not get your incision wet for 48 hours following the procedure.  You make take a sponge bath during this period.  AFTER 48 hours, you may shower but avoid direct water contact with the incision (okay to shower with AQUACEL dressing).  Try to keep your affected area as dry as possible.   You may take regular showers after 2 weeks.     Do not submerge the incision in a tub, pool, or body of water for 6 weeks.    No heavy activity with the affected arm for 6 weeks.    Avoid "rough contact" at the device site for 6 weeks.    You may return to work within 3-5 days, unless told otherwise.    You may participate in sexual activity unless instructed otherwise.     Keep your pacemaker or defibrillator ID card with you at ALL TIMES.     Notify your health care provider if you experience any of the following:  temperature >100.4     Notify your health care provider if you experience any of the following:  severe uncontrolled pain     Notify your health care provider if you experience any of the following:  redness, tenderness, or signs of infection (pain, swelling, redness, odor or green/yellow discharge around incision site)     Notify your health care provider if you experience any of the following:  difficulty breathing or increased cough     Notify your health care provider if you experience any of the following:  persistent dizziness, light-headedness, or visual disturbances       Medications:  Reconciled Home Medications:      Medication List      START taking these medications    cephALEXin 500 MG capsule  Commonly known as: KEFLEX  Take 1 capsule (500 mg total) by mouth every 8 (eight) hours. for 5 days        CHANGE how you take these medications    allopurinoL 100 MG tablet  Commonly known as: ZYLOPRIM  TAKE 1 TABLET BY MOUTH EVERY DAY  What changed: when to take this     pantoprazole 40 MG tablet  Commonly known as: " PROTONIX  TAKE 1 TABLET (40 MG TOTAL) BY MOUTH ONCE DAILY.  What changed: when to take this        CONTINUE taking these medications    amiodarone 200 MG Tab  Commonly known as: PACERONE  Take 1 tablet (200 mg total) by mouth once daily.     amLODIPine 10 MG tablet  Commonly known as: NORVASC  TAKE 1 TABLET BY MOUTH EVERY DAY     doxazosin 8 MG Tab  Commonly known as: CARDURA  Take 1 tablet (8 mg total) by mouth every 12 (twelve) hours.     ferrous gluconate 324 mg (37.5 mg iron) Tab tablet  Take 1 tablet (324 mg total) by mouth daily with lunch.     guanFACINE 2 MG tablet  Commonly known as: TENEX  Take 1 tablet (2 mg total) by mouth every evening.     levothyroxine 50 MCG tablet  Commonly known as: SYNTHROID  Take 1 tablet (50 mcg total) by mouth before breakfast.     paroxetine 10 MG tablet  Commonly known as: PAXIL  TAKE 1 TABLET BY MOUTH EVERY DAY IN THE MORNING     potassium chloride SA 20 MEQ tablet  Commonly known as: K-DUR,KLOR-CON  Take 1 tablet (20 mEq total) by mouth once daily.     sildenafiL 100 MG tablet  Commonly known as: VIAGRA  Take 1 tablet (100 mg total) by mouth daily as needed for Erectile Dysfunction.     torsemide 20 MG Tab  Commonly known as: DEMADEX  Take 1 tablet (20 mg total) by mouth once daily.     vitamin D 1000 units Tab  Commonly known as: VITAMIN D3  Take 1,000 Units by mouth once daily.     warfarin 5 MG tablet  Commonly known as: COUMADIN  Take 5mg orally daily except 2.5mg orally on Tuesdays / Fridays     zolpidem 10 mg Tab  Commonly known as: AMBIEN  Take 1 tablet (10 mg total) by mouth nightly as needed.        STOP taking these medications    aspirin 81 MG EC tablet  Commonly known as: ECOTRIN     ceFEPIme 1 gram injection  Commonly known as: MAXIPIME            Time spent on the discharge of patient: 20 minutes    Jossy Plata NP  Cardiac Electrophysiology  [facility]

## 2020-09-15 NOTE — ANESTHESIA POSTPROCEDURE EVALUATION
Anesthesia Post Evaluation    Patient: Tim Richards    Procedure(s) Performed: Procedure(s) (LRB):  Sub Q, ICD (N/A)    Final Anesthesia Type: general    Patient location during evaluation: floor  Patient participation: Yes- Able to Participate  Level of consciousness: awake and alert  Post-procedure vital signs: reviewed and stable  Pain management: adequate  Airway patency: patent    PONV status at discharge: No PONV  Anesthetic complications: no      Cardiovascular status: hemodynamically stable  Follow-up not needed.          Vitals Value Taken Time   BP 98/72 09/15/20 0442   Temp 36.5 °C (97.7 °F) 09/15/20 0442   Pulse 67 09/15/20 0600   Resp 18 09/15/20 0442   SpO2 96 % 09/15/20 0442         Event Time   Out of Recovery 16:00:00         Pain/Iker Score: Pain Rating Prior to Med Admin: 8 (9/14/2020  7:21 PM)  Iker Score: 9 (9/14/2020  4:00 PM)

## 2020-09-15 NOTE — PLAN OF CARE
Patient remains free from fall and injury. Does not complain of any pain or discomfort at this time. VSS on room air; no S/S of respiratory distress noted. NSR on tele; no S/S of cardiac distress noted. Abx therapy maintained. LVAD numbers WDR; DLES dressing to be changed 09/16/2020. Bed is in lowest locked position. Call light within reach. Plan of care reviewed with patient. Will continue to monitor.

## 2020-09-15 NOTE — PROGRESS NOTES
09/15/2020  Josefina Cook    Current provider:  Harry Yun MD      I, Josefina Cook, rounded on Tim Richards to ensure all mechanical assist device settings (VAD) were appropriate and all parameters were within limits.  I was able to ensure all back up equipment was present, the staff had no issues, and the Perfusion Department daily rounding was complete.    8:11 AM

## 2020-09-17 DIAGNOSIS — I47.20 VT (VENTRICULAR TACHYCARDIA): Primary | ICD-10-CM

## 2020-09-19 ENCOUNTER — NURSE TRIAGE (OUTPATIENT)
Dept: ADMINISTRATIVE | Facility: CLINIC | Age: 55
End: 2020-09-19

## 2020-09-19 ENCOUNTER — PATIENT MESSAGE (OUTPATIENT)
Dept: ELECTROPHYSIOLOGY | Facility: CLINIC | Age: 55
End: 2020-09-19

## 2020-09-19 NOTE — TELEPHONE ENCOUNTER
Reason for Disposition   Surgical incision symptoms and questions   [1] Raised bruise and [2] size > 2 inches (5 cm) and expanding    Additional Information   Negative: Sounds like a life-threatening emergency to the triager   Negative: Chest pain   Negative: Difficulty breathing   Negative: Acting confused (e.g., disoriented, slurred speech) or excessively sleepy   Negative: [1] Major abdominal surgical incision AND [2] wound gaping open AND [3] visible internal organs   Negative: Sounds like a life-threatening emergency to the triager   Negative: [1] Bleeding from incision AND [2] won't stop after 10 minutes of direct pressure   Negative: [1] Widespread rash AND [2] bright red, sunburn-like   Negative: Severe pain in the incision   Negative: [1] Incision gaping open AND [2] < 48 hours since wound re-opened   Negative: [1] Incision gaping open AND [2] length of opening > 2 inches (5 cm)   Negative: Patient sounds very sick or weak to the triager   Negative: Sounds like a serious complication to the triager   Negative: Fever > 100.4 F (38.0 C)   Negative: [1] Incision looks infected (spreading redness, pain) AND [2] fever > 99.5 F (37.5 C)   Negative: [1] Incision looks infected (spreading redness, pain) AND [2] large red area (> 2 in. or 5 cm)   Negative: [1] Incision looks infected (spreading redness, pain) AND [2] face wound   Negative: [1] Red streak runs from the incision AND [2] longer than 1 inch (2.5 cm)   Negative: [1] Pus or bad-smelling fluid draining from incision AND [2] no fever   Negative: [1] Post-op pain AND [2] not controlled with pain medications   Negative: Dressing soaked with blood or body fluid (e.g., drainage)    Protocols used: POST-OP SYMPTOMS AND [id number]-N-PM, POST-OP INCISION SYMPTOMS AND [id number]     Pt's Spouse called to report he had an ICD placed on Monday by Dr. Yun. Triage Nurse called Pt for 3 way call. Pt. stated since Wednesday his left side  under his arm down to his waist is bruised, purple, hard, and warm. Stated the bruise has spread since Wednesday and it hurts to lie on that side. Pt stated pictures were sent to the portal for review. Spouse stated she thinks blood is pooling under skin.  stated Provider on call is Dr. Larsen.Spoke with Dr. KEVIN Larsen and notified of Pt and Spouse concerns. MD stated he only takes inpatient calls for Dr. Yun, but Pt should go to the emergency room. Pt verbalized understanding.

## 2020-09-21 ENCOUNTER — PATIENT MESSAGE (OUTPATIENT)
Dept: ELECTROPHYSIOLOGY | Facility: CLINIC | Age: 55
End: 2020-09-21

## 2020-09-21 ENCOUNTER — CLINICAL SUPPORT (OUTPATIENT)
Dept: CARDIOLOGY | Facility: HOSPITAL | Age: 55
End: 2020-09-21
Attending: INTERNAL MEDICINE
Payer: COMMERCIAL

## 2020-09-21 ENCOUNTER — TELEPHONE (OUTPATIENT)
Dept: ELECTROPHYSIOLOGY | Facility: CLINIC | Age: 55
End: 2020-09-21

## 2020-09-21 ENCOUNTER — LAB VISIT (OUTPATIENT)
Dept: LAB | Facility: HOSPITAL | Age: 55
End: 2020-09-21
Attending: INTERNAL MEDICINE
Payer: COMMERCIAL

## 2020-09-21 ENCOUNTER — DOCUMENTATION ONLY (OUTPATIENT)
Dept: CARDIOLOGY | Facility: HOSPITAL | Age: 55
End: 2020-09-21

## 2020-09-21 ENCOUNTER — ANTI-COAG VISIT (OUTPATIENT)
Dept: CARDIOLOGY | Facility: CLINIC | Age: 55
End: 2020-09-21
Payer: COMMERCIAL

## 2020-09-21 DIAGNOSIS — Z79.01 ANTICOAGULATION MONITORING, INR RANGE 2-3: ICD-10-CM

## 2020-09-21 DIAGNOSIS — Z95.811 LVAD (LEFT VENTRICULAR ASSIST DEVICE) PRESENT: Chronic | ICD-10-CM

## 2020-09-21 DIAGNOSIS — Z95.811 LVAD (LEFT VENTRICULAR ASSIST DEVICE) PRESENT: ICD-10-CM

## 2020-09-21 DIAGNOSIS — I49.01 VF (VENTRICULAR FIBRILLATION): ICD-10-CM

## 2020-09-21 LAB
INR PPP: 2.8 (ref 0.8–1.2)
PROTHROMBIN TIME: 29.4 SEC (ref 9–12.5)

## 2020-09-21 PROCEDURE — 36415 COLL VENOUS BLD VENIPUNCTURE: CPT

## 2020-09-21 PROCEDURE — 93282 PRGRMG EVAL IMPLANTABLE DFB: CPT

## 2020-09-21 PROCEDURE — 85610 PROTHROMBIN TIME: CPT

## 2020-09-21 PROCEDURE — 93793 PR ANTICOAGULANT MGMT FOR PT TAKING WARFARIN: ICD-10-PCS | Mod: S$GLB,,,

## 2020-09-21 PROCEDURE — 93793 ANTICOAG MGMT PT WARFARIN: CPT | Mod: S$GLB,,,

## 2020-09-21 RX ORDER — OXYCODONE AND ACETAMINOPHEN 5; 325 MG/1; MG/1
TABLET ORAL
Qty: 28 TABLET | Refills: 0 | Status: SHIPPED | OUTPATIENT
Start: 2020-09-21 | End: 2020-09-21 | Stop reason: SDUPTHER

## 2020-09-21 RX ORDER — OXYCODONE AND ACETAMINOPHEN 5; 325 MG/1; MG/1
TABLET ORAL
Qty: 28 TABLET | Refills: 0 | Status: SHIPPED | OUTPATIENT
Start: 2020-09-21 | End: 2021-09-16

## 2020-09-21 NOTE — TELEPHONE ENCOUNTER
Contacted pt in reference to triage nurse note and pt advise message sent 2 days ago.  Spoke with wife. She states that he has been in a good bit of pain. Bruising from under the left arm down to the flank. She states that it appears that it is pooling and the whole flank area  is very hard to the touch. She reports last PM he decided to take pain pills and is still sleep.  Dr Yun updated on pt and reviewed images. Pt will be seen today to further assess.

## 2020-09-21 NOTE — TELEPHONE ENCOUNTER
Dr Yun will e prescribe. User and doctor have to be the same to send in percocet.         ----- Message from Rekha Hoang sent at 9/21/2020  3:24 PM CDT -----  Regarding: New Rx  [facility] pharmacist called stating a new Rx is needed for the pt Percocet 5/325 mg because there was no date and MAINOR#. She said it can be escribed this time if you can.  [id number] [place], [place] - [address] [phone] (Phone)  [phone] (Fax)      Thanks

## 2020-09-21 NOTE — PROGRESS NOTES
Wife advised and r/s labs to 9/29 . She also stated that Dr. Bautista will be changing patient INR range due to recent bleeding from his last procedure.

## 2020-09-21 NOTE — PROGRESS NOTES
Pt spoke to RN Device supervisor on this am and is due to come into the clinic on this am for evaluation.

## 2020-09-21 NOTE — TELEPHONE ENCOUNTER
Returned call to pt's wife. Informed that Dr Yun will e prescribe the percocet to the pharmacy        ----- Message from Yvonne Calderon MA sent at 9/21/2020  3:58 PM CDT -----  Please advise     Thank you,   Yvonne Calderon MA  [facility]      ----- Message -----  From: Natalie Palacio MA  Sent: 9/21/2020   3:47 PM CDT  To: Jama MILLARD Staff    Please call the patient wife Kelly at [phone] she need to talk to you about his visit and his medication. Thank you.

## 2020-09-22 NOTE — TELEPHONE ENCOUNTER
Called pt in reference to Percocet prescription. She stated that the prescription was not e prescribed. Called [facility] and spoke with pharmacy. MAINOR # was verified and date medication prescribed. [facility] medication.

## 2020-09-24 ENCOUNTER — PATIENT MESSAGE (OUTPATIENT)
Dept: ENDOCRINOLOGY | Facility: CLINIC | Age: 55
End: 2020-09-24

## 2020-09-24 ENCOUNTER — TELEPHONE (OUTPATIENT)
Dept: ENDOCRINOLOGY | Facility: CLINIC | Age: 55
End: 2020-09-24

## 2020-09-24 ENCOUNTER — OFFICE VISIT (OUTPATIENT)
Dept: TRANSPLANT | Facility: CLINIC | Age: 55
End: 2020-09-24
Attending: INTERNAL MEDICINE
Payer: COMMERCIAL

## 2020-09-24 ENCOUNTER — LAB VISIT (OUTPATIENT)
Dept: LAB | Facility: HOSPITAL | Age: 55
End: 2020-09-24
Attending: INTERNAL MEDICINE
Payer: COMMERCIAL

## 2020-09-24 ENCOUNTER — CLINICAL SUPPORT (OUTPATIENT)
Dept: TRANSPLANT | Facility: CLINIC | Age: 55
End: 2020-09-24
Payer: COMMERCIAL

## 2020-09-24 VITALS — BODY MASS INDEX: 37.24 KG/M2 | HEIGHT: 73 IN | SYSTOLIC BLOOD PRESSURE: 85 MMHG | WEIGHT: 281 LBS | TEMPERATURE: 99 F

## 2020-09-24 DIAGNOSIS — Z95.811 LVAD (LEFT VENTRICULAR ASSIST DEVICE) PRESENT: Chronic | ICD-10-CM

## 2020-09-24 DIAGNOSIS — I50.42 CHRONIC COMBINED SYSTOLIC AND DIASTOLIC HEART FAILURE: ICD-10-CM

## 2020-09-24 DIAGNOSIS — T46.2X1A HYPOTHYROIDISM DUE TO AMIODARONE: Primary | ICD-10-CM

## 2020-09-24 DIAGNOSIS — Z95.811 LVAD (LEFT VENTRICULAR ASSIST DEVICE) PRESENT: ICD-10-CM

## 2020-09-24 DIAGNOSIS — E03.2 HYPOTHYROIDISM DUE TO AMIODARONE: Primary | ICD-10-CM

## 2020-09-24 DIAGNOSIS — T46.2X1A HYPOTHYROIDISM DUE TO AMIODARONE: ICD-10-CM

## 2020-09-24 DIAGNOSIS — E03.2 HYPOTHYROIDISM DUE TO DRUGS: ICD-10-CM

## 2020-09-24 DIAGNOSIS — G47.00 INSOMNIA, UNSPECIFIED TYPE: ICD-10-CM

## 2020-09-24 DIAGNOSIS — E03.2 HYPOTHYROIDISM DUE TO AMIODARONE: ICD-10-CM

## 2020-09-24 LAB
ALBUMIN SERPL BCP-MCNC: 3.7 G/DL (ref 3.5–5.2)
ALP SERPL-CCNC: 94 U/L (ref 55–135)
ALT SERPL W/O P-5'-P-CCNC: 17 U/L (ref 10–44)
ANION GAP SERPL CALC-SCNC: 12 MMOL/L (ref 8–16)
AST SERPL-CCNC: 21 U/L (ref 10–40)
BASOPHILS # BLD AUTO: 0.02 K/UL (ref 0–0.2)
BASOPHILS NFR BLD: 0.4 % (ref 0–1.9)
BILIRUB DIRECT SERPL-MCNC: 0.3 MG/DL (ref 0.1–0.3)
BILIRUB SERPL-MCNC: 0.7 MG/DL (ref 0.1–1)
BNP SERPL-MCNC: 164 PG/ML (ref 0–99)
BUN SERPL-MCNC: 14 MG/DL (ref 6–20)
CALCIUM SERPL-MCNC: 9.2 MG/DL (ref 8.7–10.5)
CHLORIDE SERPL-SCNC: 102 MMOL/L (ref 95–110)
CO2 SERPL-SCNC: 25 MMOL/L (ref 23–29)
CREAT SERPL-MCNC: 1.9 MG/DL (ref 0.5–1.4)
CRP SERPL-MCNC: 38.7 MG/L (ref 0–8.2)
DIFFERENTIAL METHOD: ABNORMAL
EOSINOPHIL # BLD AUTO: 0.1 K/UL (ref 0–0.5)
EOSINOPHIL NFR BLD: 2.6 % (ref 0–8)
ERYTHROCYTE [DISTWIDTH] IN BLOOD BY AUTOMATED COUNT: 14.5 % (ref 11.5–14.5)
EST. GFR  (AFRICAN AMERICAN): 45.5 ML/MIN/1.73 M^2
EST. GFR  (NON AFRICAN AMERICAN): 39.4 ML/MIN/1.73 M^2
GLUCOSE SERPL-MCNC: 87 MG/DL (ref 70–110)
HCT VFR BLD AUTO: 37.2 % (ref 40–54)
HGB BLD-MCNC: 10.8 G/DL (ref 14–18)
IMM GRANULOCYTES # BLD AUTO: 0.01 K/UL (ref 0–0.04)
IMM GRANULOCYTES NFR BLD AUTO: 0.2 % (ref 0–0.5)
INR PPP: 1.7 (ref 0.8–1.2)
LDH SERPL L TO P-CCNC: 339 U/L (ref 110–260)
LYMPHOCYTES # BLD AUTO: 1.1 K/UL (ref 1–4.8)
LYMPHOCYTES NFR BLD: 21.1 % (ref 18–48)
MAGNESIUM SERPL-MCNC: 2.1 MG/DL (ref 1.6–2.6)
MCH RBC QN AUTO: 26.2 PG (ref 27–31)
MCHC RBC AUTO-ENTMCNC: 29 G/DL (ref 32–36)
MCV RBC AUTO: 90 FL (ref 82–98)
MONOCYTES # BLD AUTO: 0.6 K/UL (ref 0.3–1)
MONOCYTES NFR BLD: 10.2 % (ref 4–15)
NEUTROPHILS # BLD AUTO: 3.6 K/UL (ref 1.8–7.7)
NEUTROPHILS NFR BLD: 65.5 % (ref 38–73)
NRBC BLD-RTO: 0 /100 WBC
PHOSPHATE SERPL-MCNC: 2.9 MG/DL (ref 2.7–4.5)
PLATELET # BLD AUTO: 280 K/UL (ref 150–350)
PMV BLD AUTO: 11.1 FL (ref 9.2–12.9)
POTASSIUM SERPL-SCNC: 3.7 MMOL/L (ref 3.5–5.1)
PREALB SERPL-MCNC: 21 MG/DL (ref 20–43)
PROT SERPL-MCNC: 7.6 G/DL (ref 6–8.4)
PROTHROMBIN TIME: 18.8 SEC (ref 9–12.5)
RBC # BLD AUTO: 4.13 M/UL (ref 4.6–6.2)
SODIUM SERPL-SCNC: 139 MMOL/L (ref 136–145)
T4 FREE SERPL-MCNC: 1.21 NG/DL (ref 0.71–1.51)
TSH SERPL DL<=0.005 MIU/L-ACNC: 6.45 UIU/ML (ref 0.4–4)
WBC # BLD AUTO: 5.41 K/UL (ref 3.9–12.7)

## 2020-09-24 PROCEDURE — 85610 PROTHROMBIN TIME: CPT

## 2020-09-24 PROCEDURE — 83615 LACTATE (LD) (LDH) ENZYME: CPT

## 2020-09-24 PROCEDURE — 84134 ASSAY OF PREALBUMIN: CPT

## 2020-09-24 PROCEDURE — 84100 ASSAY OF PHOSPHORUS: CPT

## 2020-09-24 PROCEDURE — 83880 ASSAY OF NATRIURETIC PEPTIDE: CPT

## 2020-09-24 PROCEDURE — 85025 COMPLETE CBC W/AUTO DIFF WBC: CPT

## 2020-09-24 PROCEDURE — 83735 ASSAY OF MAGNESIUM: CPT

## 2020-09-24 PROCEDURE — 86140 C-REACTIVE PROTEIN: CPT

## 2020-09-24 PROCEDURE — 99215 OFFICE O/P EST HI 40 MIN: CPT | Mod: S$GLB,,, | Performed by: INTERNAL MEDICINE

## 2020-09-24 PROCEDURE — 99999 PR PBB SHADOW E&M-EST. PATIENT-LVL IV: ICD-10-PCS | Mod: PBBFAC,,, | Performed by: INTERNAL MEDICINE

## 2020-09-24 PROCEDURE — 36415 COLL VENOUS BLD VENIPUNCTURE: CPT

## 2020-09-24 PROCEDURE — 99215 PR OFFICE/OUTPT VISIT, EST, LEVL V, 40-54 MIN: ICD-10-PCS | Mod: S$GLB,,, | Performed by: INTERNAL MEDICINE

## 2020-09-24 PROCEDURE — 82248 BILIRUBIN DIRECT: CPT

## 2020-09-24 PROCEDURE — 84443 ASSAY THYROID STIM HORMONE: CPT

## 2020-09-24 PROCEDURE — 84439 ASSAY OF FREE THYROXINE: CPT

## 2020-09-24 PROCEDURE — 80053 COMPREHEN METABOLIC PANEL: CPT

## 2020-09-24 PROCEDURE — 99999 PR PBB SHADOW E&M-EST. PATIENT-LVL IV: CPT | Mod: PBBFAC,,, | Performed by: INTERNAL MEDICINE

## 2020-09-24 RX ORDER — LEVOTHYROXINE SODIUM 50 UG/1
TABLET ORAL
Qty: 104 TABLET | Refills: 4 | Status: SHIPPED | OUTPATIENT
Start: 2020-09-24 | End: 2020-11-12

## 2020-09-24 RX ORDER — ZOLPIDEM TARTRATE 10 MG/1
10 TABLET ORAL NIGHTLY PRN
Qty: 30 TABLET | Refills: 5 | Status: SHIPPED | OUTPATIENT
Start: 2020-09-24 | End: 2020-10-26 | Stop reason: SDUPTHER

## 2020-09-24 RX ORDER — TORSEMIDE 20 MG/1
40 TABLET ORAL DAILY
Qty: 30 TABLET | Refills: 11 | Status: SHIPPED | OUTPATIENT
Start: 2020-09-24 | End: 2021-03-19

## 2020-09-24 RX ORDER — POTASSIUM CHLORIDE 20 MEQ/1
20 TABLET, EXTENDED RELEASE ORAL 2 TIMES DAILY
Qty: 30 TABLET | Refills: 11 | Status: SHIPPED | OUTPATIENT
Start: 2020-09-24 | End: 2021-07-23 | Stop reason: SDUPTHER

## 2020-09-24 NOTE — PATIENT INSTRUCTIONS
1. Increase your torsemide to 40 mg daily  2. Increase your potassium to 20 meq twice a day  3. Please reduce your salt intake to < 2 grams/day  4. Limit fluid intake to NMT 2 liters/day (best if 1.5 l/day)  5. Please have blood test done on Tuesday at the latest.  6. Monitor your weight every day  7. Call us if you are not loosing weight/not making sufficient urine.

## 2020-09-24 NOTE — LETTER
September 24, 2020        Nader Chiu  [address]  [address]  [place] [address]  Phone: [phone]  Fax: [phone]             [facility]  [address]  [place] [address]  Phone: [phone]   Patient: Tim Richards   MR Number: [id number]   YOB: 1965   Date of Visit: 9/24/2020       Dear Dr. Nader Chiu    Thank you for referring Tim Richards to me for evaluation. Attached you will find relevant portions of my assessment and plan of care.    If you have questions, please do not hesitate to call me. I look forward to following Tim Richards along with you.    Sincerely,    Isaiah Santizo MD    Enclosure    If you would like to receive this communication electronically, please contact [email or web address] or [phone] to request [employer] access.    [employer] is a tool which provides read-only access to select patient information with whom you have a relationship. Its easy to use and provides real time access to review your patients record including encounter summaries, notes, results, and demographic information.    If you feel you have received this communication in error or would no longer like to receive these types of communications, please e-mail [email or web address]

## 2020-09-24 NOTE — PROGRESS NOTES
Subjective:   Patient ID:  Tim Richards is a 54 y.o. male who presents for LVAD followup visit.    Implant date: 3/8/2018    TXP SACHI INTERROGATIONS 9/24/2020   Type HeartMate II   Flow 4.9   Speed 9800   PI 2.7   Power (Jackson) 6.1   LSL 9400   Pulsatility Intermittent pulse       HPI  Mr. Richards is a very pleasant 55 yo black male with DCM, HBP, CHF stage D s/p HM 2 placement on 3/8/2018 who was recently admitted for ICD pocket infection on 7/1. Wound cultures grew Fusobacterium nucleatum and Prevotella (B.) Melaninogenica. He was treated with IV antibiotics. Underwent device extraction on 7/10 by Dr Yun. Had significant bleeding during procedure requiring transfusion of 2 unit of PRBCs. Also was hypotensive after procedure requiring IVF and . He was observed in ICU overnight and transferred back to regular floor the following day. On 7/13 his Hgb was 7.5 and he reported dizziness and fatigue. He was transfused one unit of PRBCs for symptomatic anemia. Discharged home on stable condition, on IV cefepime and LifeVest. Plan was to follow up with Dr Yun 1 week after completion of IV abx (will complete of 7/24) for subQ ICD.   Mr. Richards then presented to [facility] [place] arrhythmia clinic on 08/07/20 for further evaluation of S-ICD.  At that point he was doing well with hematoma resolving and completion of IV antibiotics.  No LifeVest therapies deployed.  In the setting of high infection risk, Mr. Richards elected to proceed with  S-ICD implant in order to avoid placing intracardiac leads.  Mr. Richards underwent S-ICD implantation.  He tolerated the procedure well and there were no acute complications. Left midaxillary site CDI.    Patient feels better in general. He denies fever, or systemic symptoms of infection. He does not have congestive symptoms but admits weight gain of 20 pounds in last month and leg swelling. He admits eating food with high salt content.    Review of Systems   Constitution: Negative for chills,  decreased appetite, diaphoresis, fever, malaise/fatigue, weight gain and weight loss.   HENT: Negative for ear discharge, hearing loss and sore throat.    Eyes: Negative for blurred vision, double vision and visual disturbance.   Cardiovascular: Positive for leg swelling. Negative for chest pain, orthopnea, palpitations, paroxysmal nocturnal dyspnea and syncope.   Respiratory: Positive for shortness of breath. Negative for cough, hemoptysis and wheezing.    Endocrine: Negative for cold intolerance and heat intolerance.   Skin: Negative for rash.   Gastrointestinal: Negative for bloating, abdominal pain, constipation, diarrhea, hematemesis, hematochezia, jaundice, melena, nausea and vomiting.   Genitourinary: Negative for dysuria, frequency, hematuria, nocturia and urgency.   Neurological: Negative for dizziness, headaches and seizures.     Current Outpatient Medications on File Prior to Visit   Medication Sig Dispense Refill    allopurinol (ZYLOPRIM) 100 MG tablet TAKE 1 TABLET BY MOUTH EVERY DAY (Patient taking differently: Take 100 mg by mouth nightly. ) 30 tablet 5    amiodarone (PACERONE) 200 MG Tab Take 1 tablet (200 mg total) by mouth once daily. 30 tablet 11    amLODIPine (NORVASC) 10 MG tablet TAKE 1 TABLET BY MOUTH EVERY DAY 90 tablet 3    doxazosin (CARDURA) 8 MG Tab Take 1 tablet (8 mg total) by mouth every 12 (twelve) hours. 60 tablet 11    ferrous gluconate 324 mg (37.5 mg iron) Tab tablet Take 1 tablet (324 mg total) by mouth daily with lunch. 30 tablet 11    guanFACINE (TENEX) 2 MG tablet Take 1 tablet (2 mg total) by mouth every evening. 30 tablet 11    oxyCODONE-acetaminophen (PERCOCET) 5-325 mg per tablet Take 1-2 tablets every 4 hours as needed for pain 28 tablet 0    pantoprazole (PROTONIX) 40 MG tablet TAKE 1 TABLET (40 MG TOTAL) BY MOUTH ONCE DAILY. (Patient taking differently: Take 40 mg by mouth daily with lunch. ) 90 tablet 3    paroxetine (PAXIL) 10 MG tablet TAKE 1 TABLET BY MOUTH  "EVERY DAY IN THE MORNING 90 tablet 1    sildenafiL (VIAGRA) 100 MG tablet Take 1 tablet (100 mg total) by mouth daily as needed for Erectile Dysfunction. 10 tablet 1    vitamin D (VITAMIN D3) 1000 units Tab Take 1,000 Units by mouth once daily.      warfarin (COUMADIN) 5 MG tablet Take 5mg orally daily except 2.5mg orally on Tuesdays / Fridays 30 tablet 11    [DISCONTINUED] levothyroxine (SYNTHROID) 50 MCG tablet Take 1 tablet (50 mcg total) by mouth before breakfast. 90 tablet 4    [DISCONTINUED] potassium chloride SA (K-DUR,KLOR-CON) 20 MEQ tablet Take 1 tablet (20 mEq total) by mouth once daily. 30 tablet 11    [DISCONTINUED] torsemide (DEMADEX) 20 MG Tab Take 1 tablet (20 mg total) by mouth once daily. 30 tablet 11    [DISCONTINUED] zolpidem (AMBIEN) 10 mg Tab Take 1 tablet (10 mg total) by mouth nightly as needed. 30 tablet 5     No current facility-administered medications on file prior to visit.        Objective:   Blood pressure (!) 85/0, temperature 99 °F (37.2 °C), temperature source Oral, height 6' 1" (1.854 m), weight 127.5 kg (281 lb).body mass index is 37.07 kg/m².    Doppler: 85    Physical Exam   Constitutional: He is oriented to person, place, and time. He appears well-developed and well-nourished.   HENT:   Head: Normocephalic and atraumatic.   Right Ear: External ear normal.   Left Ear: External ear normal.   Eyes: Pupils are equal, round, and reactive to light. Conjunctivae and EOM are normal.   Neck: Normal range of motion. Neck supple. JVD present.   Cardiovascular: Exam reveals no gallop and no friction rub.   No murmur heard.  Normal hum of LVAD   Pulmonary/Chest: Effort normal and breath sounds normal.   Abdominal: Soft. Bowel sounds are normal. He exhibits no distension. There is no abdominal tenderness. There is no rebound and no guarding.   Musculoskeletal:         General: Edema present.   Neurological: He is alert and oriented to person, place, and time. He has normal strength. "       Lab Results   Component Value Date    WBC 5.41 09/24/2020    HGB 10.8 (L) 09/24/2020    HCT 37.2 (L) 09/24/2020    MCV 90 09/24/2020     09/24/2020    CO2 25 09/24/2020    CREATININE 1.9 (H) 09/24/2020    CALCIUM 9.2 09/24/2020    ALBUMIN 3.7 09/24/2020    AST 21 09/24/2020     (H) 09/24/2020    ALT 17 09/24/2020     (H) 09/24/2020       Lab Results   Component Value Date    INR 1.7 (H) 09/24/2020    INR 2.8 (H) 09/21/2020    INR 2.5 (H) 09/14/2020       BNP   Date Value Ref Range Status   09/24/2020 164 (H) 0 - 99 pg/mL Final     Comment:     Values of less than 100 pg/ml are consistent with non-CHF populations.   07/30/2020 168 (H) 0 - 99 pg/mL Final     Comment:     Values of less than 100 pg/ml are consistent with non-CHF populations.   07/28/2020 170 (H) 0 - 99 pg/mL Final     Comment:     Values of less than 100 pg/ml are consistent with non-CHF populations.       LD   Date Value Ref Range Status   09/24/2020 339 (H) 110 - 260 U/L Final     Comment:     Results are increased in hemolyzed samples.   07/30/2020 390 (H) 110 - 260 U/L Final     Comment:     Results are increased in hemolyzed samples.   07/28/2020 502 (H) 110 - 260 U/L Final     Comment:     Results are increased in hemolyzed samples.         Assessment:      1. Chronic combined systolic and diastolic heart failure    2. Insomnia, unspecified type    3. LVAD (left ventricular assist device) present        Plan:   1. Chronic cardiomyopathy and systolic HF, NYHA class 2, stage D, s/p LVAD.  Hemodynamic status: warm and hypervolemic.  1. Increase torsemide from 20 mg to 40 mg daily  2. Increase potassium to 20 meq twice a day  3. Patient was asked to reduce salt intake to < 2 grams/day  4. Limit fluid intake to NMT 2 liters/day (best if 1.5 l/day)  5. BMP on Tuesday    LVAD related complications:   -Bleeding (gastrointestinal, epistaxis, etc): none  -RV failure:  No overt RV failure  -Thrombotic events and LDH:    -DLES and  Infection: none, DLES 1.    INR subtherapeutic, warfarin dose will be adjusted. LDH at baseline.      Patient is now NYHA II  Recommend 2 gram sodium restriction and 1500cc fluid restriction.  Encourage physical activity with graded exercise program.  Requested patient to weigh themselves daily, and to notify us if their weight increases by more than 3 lbs in 1 day or 5 lbs in 1 week.     Listed for transplant: No    UNOS Patient Status  Functional Status: 90% - Able to carry on normal activity: minor symptoms of disease  Physical Capacity: No Limitations  Working for Income: Unknown

## 2020-09-24 NOTE — PROGRESS NOTES
Date of Implant with Heartmate II LVAD: 3/8/18    PATIENT ARRIVED IN CLINIC:  Ambulatory   Accompanied by:     Vitals  Temperature, oral:   Temp Readings from Last 1 Encounters:   20 99 °F (37.2 °C) (Oral)     Blood Pressure:   BP Readings from Last 3 Encounters:   20 (!) 85/0   09/15/20 (!) 72/0   20 (!) 90/0        VAD Interrogation:  TXP SACHI INTERROGATIONS 2020 9/15/2020 9/15/2020   Type HeartMate II - -   Flow 4.9 - -   Speed 9800 - -   PI 2.7 - -   Power (Jackson) 6.1 - -   LSL 9400 - -   Pulsatility Intermittent pulse Intermittent pulse Intermittent pulse       History Lo.log  Problems / Issues / Alarms with VAD if any: None noted  VAD Sounds: HM2 Smooth      HCT:   Lab Results   Component Value Date    HCT 37.2 (L) 2020    HCT 38 07/10/2020       Complaints/reason for visit today: routine  Emergency Equipment With Patient: yes   VAD Binder With Patient: no   Reviewed VAD Numbers In Binder: no    Any Equipment Issues: None noted (Refer to  note for complete details)    DLES Assessment:  Appearance Of Driveline: 1  Antibiotics: NO  Velour: no  Manual & Visual Inspection Of Driveline: No kinks or tears noted  Stabilization Device In Use: yes, sun securement device      Patient MyChart Questionnaire:        Assessment:   PAIN: NO  Complaints Of Nausea / Vomiting: None noted    Appearance and Frequency Of Stools: normal and formed without blood & daily  Color Of Urine: clear/yellow  Coping/Depression/Anxiety: coping okay  Sleep Habits: 6-7 hrs /night  Sleep Aids: None noted  Showering: Yes, reminded to change dressing immediately after drying off  Activity/Exercise: pt reports walking daily   Driving: Yes. Reminded to pull over should there be an alarm before looking down at controller.    DLES Dressing Care:   Frequency of Dressing Changes: twice per week & daily kit  Pt In Need Of Management Kits?:no   It is medically necessary to have VAD management  kits in order to prevent infection or to assist in the healing of an infected DLES.    Labs:    Chemistry        Component Value Date/Time     09/24/2020 1405    K 3.7 09/24/2020 1405     09/24/2020 1405    CO2 25 09/24/2020 1405    BUN 14 09/24/2020 1405    CREATININE 1.9 (H) 09/24/2020 1405    GLU 87 09/24/2020 1405        Component Value Date/Time    CALCIUM 9.2 09/24/2020 1405    ALKPHOS 94 09/24/2020 1405    AST 21 09/24/2020 1405    ALT 17 09/24/2020 1405    BILITOT 0.7 09/24/2020 1405    ESTGFRAFRICA 45.5 (A) 09/24/2020 1405    EGFRNONAA 39.4 (A) 09/24/2020 1405            Magnesium   Date Value Ref Range Status   09/24/2020 2.1 1.6 - 2.6 mg/dL Final       Lab Results   Component Value Date    WBC 5.41 09/24/2020    HGB 10.8 (L) 09/24/2020    HCT 37.2 (L) 09/24/2020    MCV 90 09/24/2020     09/24/2020       Lab Results   Component Value Date    INR 1.7 (H) 09/24/2020    INR 2.8 (H) 09/21/2020    INR 2.5 (H) 09/14/2020       BNP   Date Value Ref Range Status   09/24/2020 164 (H) 0 - 99 pg/mL Final     Comment:     Values of less than 100 pg/ml are consistent with non-CHF populations.   07/30/2020 168 (H) 0 - 99 pg/mL Final     Comment:     Values of less than 100 pg/ml are consistent with non-CHF populations.   07/28/2020 170 (H) 0 - 99 pg/mL Final     Comment:     Values of less than 100 pg/ml are consistent with non-CHF populations.       LD   Date Value Ref Range Status   09/24/2020 339 (H) 110 - 260 U/L Final     Comment:     Results are increased in hemolyzed samples.   07/30/2020 390 (H) 110 - 260 U/L Final     Comment:     Results are increased in hemolyzed samples.   07/28/2020 502 (H) 110 - 260 U/L Final     Comment:     Results are increased in hemolyzed samples.       Labs reviewed with patient: YES      Patient Satisfaction Survey completed per patient: No  (explained about signature and box to check)  Medication reconciliation: per MA.  Medication Detail updated today:  no  Coumadin Managed by: [facility]    Education: Reviewed driveline care, emergency procedures, how to change the controller, alarms with patient, as well as discussed how to page the VAD coordinator in case of an emergency.   Coved - 19 education: Reminded patient/caregiver to check temperature daily and call us if it is > 99.0.  Reminded them  to stay 6 feet away from other people, wash hands frequently, don't touch your face and stay home except yo get labs, medications, and appts.      Plans/Needs: PT doing very well since recent ICD reimplant.  Per Dr. Barr, Increase your torsemide to 40 mg daily,  Increase your potassium to 20 meq twice a day and will do CMP/BNP on Tuesday Pt to RTC in 1 month.   Hurricane Season: Yes, discussed with patient: With hurricane season approaching, we want to make sure you are fully prepared for any emergency.  Should the National Weather Service or your local authorities recommend a voluntary or mandatory evacuation of your area, The VAD team requires you to evacuate to a safe place.  Remember, when it is a mandatory evacuation, traffic will become an issue for your limited battery power.  Therefore, we strongly urge you to evacuate early.    The VAD team advises you to have the following in place before hurricane season:  Have an evacuation plan in place including places to evacuate, names and phone numbers.  This information is required to be given to the VAD coordinator.   1. Have your VAD emergency contact numbers with you.   2. Make sure your prescriptions will not run out by the end of September.   3. Make sure you have enough medications, including pills, inhalers, patches,   etc. to take, should you be gone for more than 2 weeks.   4. Make sure ALL of your batteries are fully charged.   5. Bring enough dressing change supplies to last for at least 2 weeks.   6. Bring your VAD binder with you.  Make sure your binder is updated and complete with alarms  reference card, patient hand book, emergency contact numbers, daily log sheets, etc.  If you do not have family or friends as an evacuation destination, we recommend evacuating to a safe area.   Do NOT evacuate to [facility].    The VAD team wants to stress the importance of planning for your evacuation in the event of a hurricane.  If you have any LVAD questions or issues, please contact the LVAD coordinator.

## 2020-09-28 ENCOUNTER — PATIENT MESSAGE (OUTPATIENT)
Dept: TRANSPLANT | Facility: CLINIC | Age: 55
End: 2020-09-28

## 2020-09-29 ENCOUNTER — LAB VISIT (OUTPATIENT)
Dept: LAB | Facility: HOSPITAL | Age: 55
End: 2020-09-29
Attending: INTERNAL MEDICINE
Payer: COMMERCIAL

## 2020-09-29 ENCOUNTER — ANTI-COAG VISIT (OUTPATIENT)
Dept: CARDIOLOGY | Facility: CLINIC | Age: 55
End: 2020-09-29
Payer: COMMERCIAL

## 2020-09-29 ENCOUNTER — TELEPHONE (OUTPATIENT)
Dept: TRANSPLANT | Facility: CLINIC | Age: 55
End: 2020-09-29

## 2020-09-29 ENCOUNTER — CLINICAL SUPPORT (OUTPATIENT)
Dept: CARDIOLOGY | Facility: HOSPITAL | Age: 55
End: 2020-09-29
Attending: INTERNAL MEDICINE
Payer: COMMERCIAL

## 2020-09-29 DIAGNOSIS — I50.9 HEART FAILURE: ICD-10-CM

## 2020-09-29 DIAGNOSIS — Z95.811 LVAD (LEFT VENTRICULAR ASSIST DEVICE) PRESENT: ICD-10-CM

## 2020-09-29 DIAGNOSIS — I49.01 VF (VENTRICULAR FIBRILLATION): ICD-10-CM

## 2020-09-29 DIAGNOSIS — Z95.811 HEART REPLACED BY HEART ASSIST DEVICE: ICD-10-CM

## 2020-09-29 DIAGNOSIS — Z79.01 ANTICOAGULATION MONITORING, INR RANGE 2-3: ICD-10-CM

## 2020-09-29 DIAGNOSIS — Z95.811 LVAD (LEFT VENTRICULAR ASSIST DEVICE) PRESENT: Chronic | ICD-10-CM

## 2020-09-29 LAB
ALBUMIN SERPL BCP-MCNC: 4.1 G/DL (ref 3.5–5.2)
ALP SERPL-CCNC: 107 U/L (ref 55–135)
ALT SERPL W/O P-5'-P-CCNC: 15 U/L (ref 10–44)
ANION GAP SERPL CALC-SCNC: 11 MMOL/L (ref 8–16)
AST SERPL-CCNC: 24 U/L (ref 10–40)
BILIRUB SERPL-MCNC: 0.6 MG/DL (ref 0.1–1)
BNP SERPL-MCNC: 190 PG/ML (ref 0–99)
BUN SERPL-MCNC: 12 MG/DL (ref 6–20)
CALCIUM SERPL-MCNC: 9.4 MG/DL (ref 8.7–10.5)
CHLORIDE SERPL-SCNC: 101 MMOL/L (ref 95–110)
CO2 SERPL-SCNC: 26 MMOL/L (ref 23–29)
CREAT SERPL-MCNC: 1.9 MG/DL (ref 0.5–1.4)
EST. GFR  (AFRICAN AMERICAN): 45.5 ML/MIN/1.73 M^2
EST. GFR  (NON AFRICAN AMERICAN): 39.4 ML/MIN/1.73 M^2
GLUCOSE SERPL-MCNC: 91 MG/DL (ref 70–110)
INR PPP: 2.2 (ref 0.8–1.2)
POTASSIUM SERPL-SCNC: 3.6 MMOL/L (ref 3.5–5.1)
PROT SERPL-MCNC: 8.1 G/DL (ref 6–8.4)
PROTHROMBIN TIME: 23.9 SEC (ref 9–12.5)
SODIUM SERPL-SCNC: 138 MMOL/L (ref 136–145)

## 2020-09-29 PROCEDURE — 93793 ANTICOAG MGMT PT WARFARIN: CPT | Mod: S$GLB,,,

## 2020-09-29 PROCEDURE — 80053 COMPREHEN METABOLIC PANEL: CPT

## 2020-09-29 PROCEDURE — 85610 PROTHROMBIN TIME: CPT

## 2020-09-29 PROCEDURE — 83880 ASSAY OF NATRIURETIC PEPTIDE: CPT

## 2020-09-29 PROCEDURE — 93793 PR ANTICOAGULANT MGMT FOR PT TAKING WARFARIN: ICD-10-PCS | Mod: S$GLB,,,

## 2020-09-29 PROCEDURE — 36415 COLL VENOUS BLD VENIPUNCTURE: CPT

## 2020-09-29 NOTE — PROGRESS NOTES
Patient presented for S-ICD wound check (number 2).     Site to left flank area clean, dry, intact. Wound edges well approximated. No drainage or bleeding. Swelling persists but patient denies pain or discomfort.   Picture taken of site and added to media.

## 2020-09-29 NOTE — TELEPHONE ENCOUNTER
Patient contacted to remind about lab appointment. Patient was getting dress at the time of call preparing to go to the lab     Spoke with Kelly .

## 2020-09-30 ENCOUNTER — TELEPHONE (OUTPATIENT)
Dept: TRANSPLANT | Facility: CLINIC | Age: 55
End: 2020-09-30

## 2020-09-30 NOTE — TELEPHONE ENCOUNTER
Lab results received and reviewed.  Called pt to check on him, spoke with his wife.  She confirmed he increased his medications, is urinating more, but still feels dizzy. His creatinine remains 1.9.  Discussed with Dr. Hadley.  Orders received to have pt come in for doppler check and repeat labs.  Called pt, spoke with wife.  He will come this afternoon.

## 2020-10-01 ENCOUNTER — LAB VISIT (OUTPATIENT)
Dept: LAB | Facility: HOSPITAL | Age: 55
End: 2020-10-01
Payer: COMMERCIAL

## 2020-10-01 ENCOUNTER — PATIENT MESSAGE (OUTPATIENT)
Dept: TRANSPLANT | Facility: CLINIC | Age: 55
End: 2020-10-01

## 2020-10-01 ENCOUNTER — ANTI-COAG VISIT (OUTPATIENT)
Dept: CARDIOLOGY | Facility: CLINIC | Age: 55
End: 2020-10-01
Payer: COMMERCIAL

## 2020-10-01 ENCOUNTER — OFFICE VISIT (OUTPATIENT)
Dept: TRANSPLANT | Facility: CLINIC | Age: 55
End: 2020-10-01
Attending: INTERNAL MEDICINE
Payer: COMMERCIAL

## 2020-10-01 ENCOUNTER — CLINICAL SUPPORT (OUTPATIENT)
Dept: TRANSPLANT | Facility: CLINIC | Age: 55
End: 2020-10-01
Payer: COMMERCIAL

## 2020-10-01 DIAGNOSIS — I50.42 CHRONIC COMBINED SYSTOLIC AND DIASTOLIC HEART FAILURE: ICD-10-CM

## 2020-10-01 DIAGNOSIS — Z79.01 ANTICOAGULATION MONITORING, INR RANGE 2-3: ICD-10-CM

## 2020-10-01 DIAGNOSIS — I13.0 HYPERTENSIVE CARDIOVASCULAR-RENAL DISEASE, STAGE 1-4 OR UNSPECIFIED CHRONIC KIDNEY DISEASE, WITH HEART FAILURE: ICD-10-CM

## 2020-10-01 DIAGNOSIS — Z79.899 HIGH RISK MEDICATIONS (NOT ANTICOAGULANTS) LONG-TERM USE: ICD-10-CM

## 2020-10-01 DIAGNOSIS — Z95.811 LVAD (LEFT VENTRICULAR ASSIST DEVICE) PRESENT: ICD-10-CM

## 2020-10-01 DIAGNOSIS — I50.9 HEART FAILURE: ICD-10-CM

## 2020-10-01 DIAGNOSIS — Z95.811 LVAD (LEFT VENTRICULAR ASSIST DEVICE) PRESENT: Primary | Chronic | ICD-10-CM

## 2020-10-01 DIAGNOSIS — N18.31 STAGE 3A CHRONIC KIDNEY DISEASE: Chronic | ICD-10-CM

## 2020-10-01 DIAGNOSIS — I42.8 NICM (NONISCHEMIC CARDIOMYOPATHY): Chronic | ICD-10-CM

## 2020-10-01 DIAGNOSIS — Z95.811 HEART REPLACED BY HEART ASSIST DEVICE: ICD-10-CM

## 2020-10-01 DIAGNOSIS — Z95.811 LVAD (LEFT VENTRICULAR ASSIST DEVICE) PRESENT: Chronic | ICD-10-CM

## 2020-10-01 LAB
ALBUMIN SERPL BCP-MCNC: 3.9 G/DL (ref 3.5–5.2)
ALP SERPL-CCNC: 111 U/L (ref 55–135)
ALT SERPL W/O P-5'-P-CCNC: 13 U/L (ref 10–44)
ANION GAP SERPL CALC-SCNC: 13 MMOL/L (ref 8–16)
AST SERPL-CCNC: 21 U/L (ref 10–40)
BILIRUB SERPL-MCNC: 0.5 MG/DL (ref 0.1–1)
BNP SERPL-MCNC: 248 PG/ML (ref 0–99)
BUN SERPL-MCNC: 13 MG/DL (ref 6–20)
CALCIUM SERPL-MCNC: 9.2 MG/DL (ref 8.7–10.5)
CHLORIDE SERPL-SCNC: 103 MMOL/L (ref 95–110)
CO2 SERPL-SCNC: 24 MMOL/L (ref 23–29)
CREAT SERPL-MCNC: 2.1 MG/DL (ref 0.5–1.4)
EST. GFR  (AFRICAN AMERICAN): 40.3 ML/MIN/1.73 M^2
EST. GFR  (NON AFRICAN AMERICAN): 34.9 ML/MIN/1.73 M^2
GLUCOSE SERPL-MCNC: 106 MG/DL (ref 70–110)
INR PPP: 2.5 (ref 0.8–1.2)
POTASSIUM SERPL-SCNC: 3.6 MMOL/L (ref 3.5–5.1)
PROT SERPL-MCNC: 7.8 G/DL (ref 6–8.4)
PROTHROMBIN TIME: 26.8 SEC (ref 9–12.5)
SODIUM SERPL-SCNC: 140 MMOL/L (ref 136–145)

## 2020-10-01 PROCEDURE — 99999 PR PBB SHADOW E&M-EST. PATIENT-LVL I: CPT | Mod: PBBFAC,,,

## 2020-10-01 PROCEDURE — 80053 COMPREHEN METABOLIC PANEL: CPT

## 2020-10-01 PROCEDURE — 83880 ASSAY OF NATRIURETIC PEPTIDE: CPT

## 2020-10-01 PROCEDURE — 99214 PR OFFICE/OUTPT VISIT, EST, LEVL IV, 30-39 MIN: ICD-10-PCS | Mod: S$GLB,,, | Performed by: INTERNAL MEDICINE

## 2020-10-01 PROCEDURE — 85610 PROTHROMBIN TIME: CPT

## 2020-10-01 PROCEDURE — 99999 PR PBB SHADOW E&M-EST. PATIENT-LVL II: CPT | Mod: PBBFAC,,, | Performed by: INTERNAL MEDICINE

## 2020-10-01 PROCEDURE — 99999 PR PBB SHADOW E&M-EST. PATIENT-LVL I: ICD-10-PCS | Mod: PBBFAC,,,

## 2020-10-01 PROCEDURE — 36415 COLL VENOUS BLD VENIPUNCTURE: CPT

## 2020-10-01 PROCEDURE — 99999 PR PBB SHADOW E&M-EST. PATIENT-LVL II: ICD-10-PCS | Mod: PBBFAC,,, | Performed by: INTERNAL MEDICINE

## 2020-10-01 PROCEDURE — 99214 OFFICE O/P EST MOD 30 MIN: CPT | Mod: S$GLB,,, | Performed by: INTERNAL MEDICINE

## 2020-10-01 NOTE — Clinical Note
He was brought in for BP check and added on but I don't see VS's  Also, did you all do an LVAD interrogation?  I need to complete note so please let me know Friday

## 2020-10-01 NOTE — LETTER
October 4, 2020        Nader Chiu  [address]  [address]  [place] [address]  Phone: [phone]  Fax: [phone]             [id number]  [address]  [place] [address]  Phone: [phone]   Patient: Tim Richards   MR Number: [id number]   YOB: 1965   Date of Visit: 10/1/2020       Dear Dr. Nader Chiu    Thank you for referring Tim Richards to me for evaluation. Attached you will find relevant portions of my assessment and plan of care.    If you have questions, please do not hesitate to call me. I look forward to following Tim Richards along with you.    Sincerely,    Antonio Hadley Jr, MD    Enclosure    If you would like to receive this communication electronically, please contact [email or web address] or [phone] to request [employer] access.    [employer] is a tool which provides read-only access to select patient information with whom you have a relationship. Its easy to use and provides real time access to review your patients record including encounter summaries, notes, results, and demographic information.    If you feel you have received this communication in error or would no longer like to receive these types of communications, please e-mail [email or web address]

## 2020-10-01 NOTE — PROGRESS NOTES
Subjective:   Patient ID:  Tim Richards is a 54 y.o. male who presents for LVAD followup visit.    Implant Date: 3/8/2018  Initials:RBB     Heartmate II RPM 9800  3/9/18 outflow graft changed     INR goal: 2-3   NO Bridge with heparin  Antiplatelets: stopped on 7/2019 due to GIB.   ASA 81 mg restarted 4/23/2020    Device not interrogated today; came for BP check and check on volume     HPI  55 yo BM with DCM, HBP, CHF stage D s/p HM 2 was given urgent appt today for nurse BP check due to rising Cr and dizzy spells.  He reports dizzy/LH spells are about same as they have been since LVAD and occur standing quickly, getting out of car.  NO palpitations and no ICD shocks--prior hx of sustained VT.  No symptoms suggestive of GI bleeding at this time.  He denies VAD alarms.    Review of Systems   Constitution: Positive for malaise/fatigue and weight gain (over past few months). Negative for chills, fever and night sweats.   Cardiovascular: Positive for dyspnea on exertion (mild). Negative for chest pain, claudication, irregular heartbeat, leg swelling, near-syncope, orthopnea, palpitations, paroxysmal nocturnal dyspnea and syncope.   Hematologic/Lymphatic: Negative for bleeding problem. Does not bruise/bleed easily.   Gastrointestinal: Negative for abdominal pain, change in bowel habit, constipation, diarrhea, hematemesis, hematochezia, melena and nausea.   Neurological: Positive for dizziness, headaches (sometimes) and light-headedness. Negative for brief paralysis and focal weakness.     Objective:   Doppler 90 mm Hg  Physical Exam   Constitutional: No distress.   Doppler 90 mm Hg   HENT:   Head: Normocephalic and atraumatic.   Doppler 90 mm Hg   Eyes: Conjunctivae are normal. Right eye exhibits no discharge. Left eye exhibits no discharge. No scleral icterus.   Neck: JVD (12-14 cm water) present. No thyromegaly present.   Cardiovascular:   Normal VAD sounds   Pulmonary/Chest: Effort normal and breath sounds  normal. No respiratory distress. He has no wheezes. He has no rales.   Abdominal: Soft. Bowel sounds are normal. He exhibits no distension and no mass. There is no abdominal tenderness. There is no rebound and no guarding.   Musculoskeletal:         General: Edema (at most 1+ edema both legs) present. No tenderness.   Neurological: He is alert.   Skin: Skin is warm and dry. He is not diaphoretic.   Psychiatric: He has a normal mood and affect. His behavior is normal. Judgment and thought content normal.     Lab Results   Component Value Date     (H) 10/01/2020     (H) 09/29/2020     (H) 09/24/2020     Lab Results   Component Value Date     10/01/2020     09/29/2020    K 3.6 10/01/2020    K 3.6 09/29/2020     10/01/2020    GLU 91 09/29/2020    BUN 13 10/01/2020    BUN 12 09/29/2020    CREATININE 2.1 (H) 10/01/2020    CREATININE 1.9 (H) 09/29/2020      MG 2.1 09/24/2020    AST 21 10/01/2020    ALT 13 10/01/2020    ALBUMIN 3.9 10/01/2020    PROT 7.8 10/01/2020    BILITOT 0.5 10/01/2020    WBC 5.41 09/24/2020    HGB 10.8 (L) 09/24/2020    HCT 37.2 (L) 09/24/2020    HCT 38 07/10/2020     09/24/2020    INR 2.5 (H) 10/01/2020     (H) 09/24/2020    TSH 6.449 (H) 09/24/2020    CHOL 259 (H) 04/23/2020     (H) 04/23/2020    LDLCALC 112.8 04/23/2020    TRIG 126 04/23/2020    D2JPGJY 5.3 04/23/2020    FREET4 1.21 09/24/2020     Assessment:      1. LVAD (left ventricular assist device) present    2. Chronic combined systolic and diastolic heart failure    3. NICM (nonischemic cardiomyopathy)    4. Stage 3a chronic kidney disease    5. Hypertensive cardiovascular-renal disease, stage 1-4 or unspecified chronic kidney disease, with heart failure    6. High risk medications (amiodarone) long-term use    7. Anticoagulation monitoring, INR range 2-3        Plan:   Diuresis, wt loss, low sodium diet and fluid restriction reviewed along with daily weights and how to respond to  3# weight gain with prn diuretics.  If this fails to restore dry weight call us within 2-3 days.     Obtain CBC with next INR  Since his symptoms are stable, doppler 90 so do not want to reduce BP meds and volume status such that I do not want to reduce diuretics will leave all same    Patient is now NYHA II    Listed for transplant: No    UNOS Patient Status  Functional Status: 70% - Cares for self: unable to carry on normal activity or active work  Physical Capacity: No Limitations  Working for Income: No  If yes, working activity level:  Disability, currently short-term but applying for long-term disability

## 2020-10-02 ENCOUNTER — PATIENT MESSAGE (OUTPATIENT)
Dept: TRANSPLANT | Facility: CLINIC | Age: 55
End: 2020-10-02

## 2020-10-02 VITALS — SYSTOLIC BLOOD PRESSURE: 90 MMHG

## 2020-10-06 NOTE — PROGRESS NOTES
Date of Implant with Heartmate II LVAD: 3/8/2018    PATIENT ARRIVED IN CLINIC:  Ambulatory   Accompanied by: self    Vitals  Temperature, oral:   Temp Readings from Last 1 Encounters:   09/24/20 99 °F (37.2 °C) (Oral)     Blood Pressure:   BP Readings from Last 3 Encounters:   10/01/20 (!) 90/0   09/24/20 (!) 85/0   09/15/20 (!) 72/0        VAD Interrogation:  TXP SACHI INTERROGATIONS 10/1/2020 9/24/2020 9/15/2020   Type HeartMate II HeartMate II -   Flow 5.8 4.9 -   Speed 9800 9800 -   PI 3.7 2.7 -   Power (Jackson) 6.7 6.1 -   LSL - 9400 -   Pulsatility - Intermittent pulse Intermittent pulse       History Log: n/a  Problems / Issues / Alarms with VAD if any: None noted  VAD Sounds: HM2 Smooth      HCT:   Lab Results   Component Value Date    HCT 37.2 (L) 09/24/2020    HCT 38 07/10/2020       Complaints/reason for visit today: routine  Emergency Equipment With Patient: yes   VAD Binder With Patient: no   Reviewed VAD Numbers In Binder: no    Any Equipment Issues: None noted (Refer to  note for complete details)    DLES Assessment: did not assess as patient was added on last minute as a FULL VAD appt; seen last Thursday in clinic for a full visit.      Patient MyChart Questionnaire:     Assessment:   PAIN: NO  Complaints Of Nausea / Vomiting: None noted    Appearance and Frequency Of Stools: normal and formed without blood & daily  Color Of Urine: clear/yellow  Coping/Depression/Anxiety: coping okay  Sleep Habits: 6-7 hrs /night  Sleep Aids: None noted  Showering: Yes, reminded to change dressing immediately after drying off  Activity/Exercise:   Driving: Yes. Reminded to pull over should there be an alarm before looking down at controller.    DLES Dressing Care:   Frequency of Dressing Changes: daily & daily kit  Pt In Need Of Management Kits?:no   It is medically necessary to have VAD management kits in order to prevent infection or to assist in the healing of an infected DLES.    Labs:     Chemistry        Component Value Date/Time     10/01/2020 1440    K 3.6 10/01/2020 1440     10/01/2020 1440    CO2 24 10/01/2020 1440    BUN 13 10/01/2020 1440    CREATININE 2.1 (H) 10/01/2020 1440     10/01/2020 1440        Component Value Date/Time    CALCIUM 9.2 10/01/2020 1440    ALKPHOS 111 10/01/2020 1440    AST 21 10/01/2020 1440    ALT 13 10/01/2020 1440    BILITOT 0.5 10/01/2020 1440    ESTGFRAFRICA 40.3 (A) 10/01/2020 1440    EGFRNONAA 34.9 (A) 10/01/2020 1440            Magnesium   Date Value Ref Range Status   09/24/2020 2.1 1.6 - 2.6 mg/dL Final       Lab Results   Component Value Date    WBC 5.41 09/24/2020    HGB 10.8 (L) 09/24/2020    HCT 37.2 (L) 09/24/2020    MCV 90 09/24/2020     09/24/2020       Lab Results   Component Value Date    INR 2.5 (H) 10/01/2020    INR 2.2 (H) 09/29/2020    INR 1.7 (H) 09/24/2020       BNP   Date Value Ref Range Status   10/01/2020 248 (H) 0 - 99 pg/mL Final     Comment:     Values of less than 100 pg/ml are consistent with non-CHF populations.   09/29/2020 190 (H) 0 - 99 pg/mL Final     Comment:     Values of less than 100 pg/ml are consistent with non-CHF populations.   09/24/2020 164 (H) 0 - 99 pg/mL Final     Comment:     Values of less than 100 pg/ml are consistent with non-CHF populations.       LD   Date Value Ref Range Status   09/24/2020 339 (H) 110 - 260 U/L Final     Comment:     Results are increased in hemolyzed samples.   07/30/2020 390 (H) 110 - 260 U/L Final     Comment:     Results are increased in hemolyzed samples.   07/28/2020 502 (H) 110 - 260 U/L Final     Comment:     Results are increased in hemolyzed samples.       Labs reviewed with patient: YES      Patient Satisfaction Survey completed per patient: No  (explained about signature and box to check)  Medication reconciliation: per MA.  Medication Detail updated today: no  Coumadin Managed by: [facility]    Education: Reviewed driveline care, emergency  procedures, how to change the controller, alarms with patient, as well as discussed how to page the VAD coordinator in case of an emergency.   Coved - 19 education: Reminded patient/caregiver to check temperature daily and call us if it is > 99.0.  Reminded them  to stay 6 feet away from other people, wash hands frequently, don't touch your face and stay home except yo get labs, medications, and appts.      Plans/Needs: Pt was being seen today as a nurse visit due to increased creatinine and c/o feeling dizzy. Doppler is 90, he did increase meds as instructed at his last appt. Dr. Hadley asked that a doctor visit be added today- done.   Per Dr Hadley:  Obtain CBC with next INR  Since his symptoms are stable, doppler 90 so do not want to reduce BP meds and volume status such that I do not want to reduce diuretics will leave all same    Will keep planned f/u.     Hurricane Season: No

## 2020-10-13 ENCOUNTER — ANTI-COAG VISIT (OUTPATIENT)
Dept: CARDIOLOGY | Facility: CLINIC | Age: 55
End: 2020-10-13
Payer: COMMERCIAL

## 2020-10-13 ENCOUNTER — LAB VISIT (OUTPATIENT)
Dept: LAB | Facility: HOSPITAL | Age: 55
End: 2020-10-13
Attending: INTERNAL MEDICINE
Payer: COMMERCIAL

## 2020-10-13 DIAGNOSIS — Z79.01 ANTICOAGULATION MONITORING, INR RANGE 2-3: ICD-10-CM

## 2020-10-13 DIAGNOSIS — Z95.811 LVAD (LEFT VENTRICULAR ASSIST DEVICE) PRESENT: ICD-10-CM

## 2020-10-13 LAB
INR PPP: 2.4 (ref 0.8–1.2)
PROTHROMBIN TIME: 26.5 SEC (ref 9–12.5)

## 2020-10-13 PROCEDURE — 93793 PR ANTICOAGULANT MGMT FOR PT TAKING WARFARIN: ICD-10-PCS | Mod: S$GLB,,,

## 2020-10-13 PROCEDURE — 85610 PROTHROMBIN TIME: CPT

## 2020-10-13 PROCEDURE — 36415 COLL VENOUS BLD VENIPUNCTURE: CPT

## 2020-10-13 PROCEDURE — 93793 ANTICOAG MGMT PT WARFARIN: CPT | Mod: S$GLB,,,

## 2020-10-26 ENCOUNTER — LAB VISIT (OUTPATIENT)
Dept: LAB | Facility: HOSPITAL | Age: 55
End: 2020-10-26
Attending: INTERNAL MEDICINE
Payer: COMMERCIAL

## 2020-10-26 ENCOUNTER — OFFICE VISIT (OUTPATIENT)
Dept: TRANSPLANT | Facility: CLINIC | Age: 55
End: 2020-10-26
Attending: INTERNAL MEDICINE
Payer: COMMERCIAL

## 2020-10-26 ENCOUNTER — ANTI-COAG VISIT (OUTPATIENT)
Dept: CARDIOLOGY | Facility: CLINIC | Age: 55
End: 2020-10-26
Payer: COMMERCIAL

## 2020-10-26 ENCOUNTER — CLINICAL SUPPORT (OUTPATIENT)
Dept: TRANSPLANT | Facility: CLINIC | Age: 55
End: 2020-10-26
Payer: COMMERCIAL

## 2020-10-26 VITALS — BODY MASS INDEX: 36.58 KG/M2 | TEMPERATURE: 98 F | HEIGHT: 73 IN | WEIGHT: 276 LBS | SYSTOLIC BLOOD PRESSURE: 90 MMHG

## 2020-10-26 DIAGNOSIS — I50.42 CHRONIC COMBINED SYSTOLIC AND DIASTOLIC HEART FAILURE: ICD-10-CM

## 2020-10-26 DIAGNOSIS — I13.0 HYPERTENSIVE CARDIOVASCULAR-RENAL DISEASE, STAGE 1-4 OR UNSPECIFIED CHRONIC KIDNEY DISEASE, WITH HEART FAILURE: ICD-10-CM

## 2020-10-26 DIAGNOSIS — Z79.01 ANTICOAGULATION MONITORING, INR RANGE 2-3: ICD-10-CM

## 2020-10-26 DIAGNOSIS — Z79.899 HIGH RISK MEDICATIONS (NOT ANTICOAGULANTS) LONG-TERM USE: ICD-10-CM

## 2020-10-26 DIAGNOSIS — Z95.811 LVAD (LEFT VENTRICULAR ASSIST DEVICE) PRESENT: ICD-10-CM

## 2020-10-26 DIAGNOSIS — Z95.811 LVAD (LEFT VENTRICULAR ASSIST DEVICE) PRESENT: Chronic | ICD-10-CM

## 2020-10-26 DIAGNOSIS — I42.8 NICM (NONISCHEMIC CARDIOMYOPATHY): Chronic | ICD-10-CM

## 2020-10-26 DIAGNOSIS — Z95.811 PRESENCE OF LEFT VENTRICULAR ASSIST DEVICE (LVAD): Primary | ICD-10-CM

## 2020-10-26 DIAGNOSIS — N18.31 STAGE 3A CHRONIC KIDNEY DISEASE: ICD-10-CM

## 2020-10-26 DIAGNOSIS — G47.00 INSOMNIA, UNSPECIFIED TYPE: ICD-10-CM

## 2020-10-26 LAB
ALBUMIN SERPL BCP-MCNC: 4.1 G/DL (ref 3.5–5.2)
ALP SERPL-CCNC: 135 U/L (ref 55–135)
ALT SERPL W/O P-5'-P-CCNC: 16 U/L (ref 10–44)
ANION GAP SERPL CALC-SCNC: 15 MMOL/L (ref 8–16)
AST SERPL-CCNC: 24 U/L (ref 10–40)
BASOPHILS # BLD AUTO: 0.03 K/UL (ref 0–0.2)
BASOPHILS NFR BLD: 0.6 % (ref 0–1.9)
BILIRUB DIRECT SERPL-MCNC: 0.2 MG/DL (ref 0.1–0.3)
BILIRUB SERPL-MCNC: 0.4 MG/DL (ref 0.1–1)
BNP SERPL-MCNC: 265 PG/ML (ref 0–99)
BUN SERPL-MCNC: 21 MG/DL (ref 6–20)
CALCIUM SERPL-MCNC: 9.6 MG/DL (ref 8.7–10.5)
CHLORIDE SERPL-SCNC: 102 MMOL/L (ref 95–110)
CO2 SERPL-SCNC: 24 MMOL/L (ref 23–29)
CREAT SERPL-MCNC: 1.9 MG/DL (ref 0.5–1.4)
CRP SERPL-MCNC: 11.4 MG/L (ref 0–8.2)
DIFFERENTIAL METHOD: ABNORMAL
EOSINOPHIL # BLD AUTO: 0.1 K/UL (ref 0–0.5)
EOSINOPHIL NFR BLD: 1.9 % (ref 0–8)
ERYTHROCYTE [DISTWIDTH] IN BLOOD BY AUTOMATED COUNT: 15.8 % (ref 11.5–14.5)
EST. GFR  (AFRICAN AMERICAN): 45.5 ML/MIN/1.73 M^2
EST. GFR  (NON AFRICAN AMERICAN): 39.4 ML/MIN/1.73 M^2
GLUCOSE SERPL-MCNC: 86 MG/DL (ref 70–110)
HCT VFR BLD AUTO: 43.5 % (ref 40–54)
HGB BLD-MCNC: 13.2 G/DL (ref 14–18)
IMM GRANULOCYTES # BLD AUTO: 0.01 K/UL (ref 0–0.04)
IMM GRANULOCYTES NFR BLD AUTO: 0.2 % (ref 0–0.5)
INR PPP: 2.3 (ref 0.8–1.2)
LDH SERPL L TO P-CCNC: 318 U/L (ref 110–260)
LYMPHOCYTES # BLD AUTO: 1.4 K/UL (ref 1–4.8)
LYMPHOCYTES NFR BLD: 27.9 % (ref 18–48)
MAGNESIUM SERPL-MCNC: 2 MG/DL (ref 1.6–2.6)
MCH RBC QN AUTO: 26.6 PG (ref 27–31)
MCHC RBC AUTO-ENTMCNC: 30.3 G/DL (ref 32–36)
MCV RBC AUTO: 88 FL (ref 82–98)
MONOCYTES # BLD AUTO: 0.7 K/UL (ref 0.3–1)
MONOCYTES NFR BLD: 13.8 % (ref 4–15)
NEUTROPHILS # BLD AUTO: 2.9 K/UL (ref 1.8–7.7)
NEUTROPHILS NFR BLD: 55.6 % (ref 38–73)
NRBC BLD-RTO: 0 /100 WBC
PHOSPHATE SERPL-MCNC: 3.2 MG/DL (ref 2.7–4.5)
PLATELET # BLD AUTO: 208 K/UL (ref 150–350)
PMV BLD AUTO: 11.6 FL (ref 9.2–12.9)
POTASSIUM SERPL-SCNC: 3.7 MMOL/L (ref 3.5–5.1)
PREALB SERPL-MCNC: 26 MG/DL (ref 20–43)
PROT SERPL-MCNC: 8.1 G/DL (ref 6–8.4)
PROTHROMBIN TIME: 24.5 SEC (ref 9–12.5)
RBC # BLD AUTO: 4.96 M/UL (ref 4.6–6.2)
SODIUM SERPL-SCNC: 141 MMOL/L (ref 136–145)
WBC # BLD AUTO: 5.16 K/UL (ref 3.9–12.7)

## 2020-10-26 PROCEDURE — 36415 COLL VENOUS BLD VENIPUNCTURE: CPT

## 2020-10-26 PROCEDURE — 86140 C-REACTIVE PROTEIN: CPT

## 2020-10-26 PROCEDURE — 99999 PR PBB SHADOW E&M-EST. PATIENT-LVL I: CPT | Mod: PBBFAC,,,

## 2020-10-26 PROCEDURE — 80053 COMPREHEN METABOLIC PANEL: CPT

## 2020-10-26 PROCEDURE — 93750 OP LVAD INTERROGATION: ICD-10-PCS | Mod: S$GLB,,, | Performed by: INTERNAL MEDICINE

## 2020-10-26 PROCEDURE — 93750 PR INTERROGATE VENT ASSIST DEV, IN PERSON, W PHYSICIAN ANALYSIS: ICD-10-PCS | Mod: S$GLB,,, | Performed by: INTERNAL MEDICINE

## 2020-10-26 PROCEDURE — 83615 LACTATE (LD) (LDH) ENZYME: CPT

## 2020-10-26 PROCEDURE — 85025 COMPLETE CBC W/AUTO DIFF WBC: CPT

## 2020-10-26 PROCEDURE — 99999 PR PBB SHADOW E&M-EST. PATIENT-LVL III: ICD-10-PCS | Mod: PBBFAC,,, | Performed by: INTERNAL MEDICINE

## 2020-10-26 PROCEDURE — 93750 INTERROGATION VAD IN PERSON: CPT | Mod: S$GLB,,, | Performed by: INTERNAL MEDICINE

## 2020-10-26 PROCEDURE — 83735 ASSAY OF MAGNESIUM: CPT

## 2020-10-26 PROCEDURE — 84100 ASSAY OF PHOSPHORUS: CPT

## 2020-10-26 PROCEDURE — 99214 PR OFFICE/OUTPT VISIT, EST, LEVL IV, 30-39 MIN: ICD-10-PCS | Mod: 25,S$GLB,, | Performed by: INTERNAL MEDICINE

## 2020-10-26 PROCEDURE — 83880 ASSAY OF NATRIURETIC PEPTIDE: CPT

## 2020-10-26 PROCEDURE — 85610 PROTHROMBIN TIME: CPT

## 2020-10-26 PROCEDURE — 82248 BILIRUBIN DIRECT: CPT

## 2020-10-26 PROCEDURE — 84134 ASSAY OF PREALBUMIN: CPT

## 2020-10-26 PROCEDURE — 99999 PR PBB SHADOW E&M-EST. PATIENT-LVL III: CPT | Mod: PBBFAC,,, | Performed by: INTERNAL MEDICINE

## 2020-10-26 PROCEDURE — 99999 PR PBB SHADOW E&M-EST. PATIENT-LVL I: ICD-10-PCS | Mod: PBBFAC,,,

## 2020-10-26 PROCEDURE — 99214 OFFICE O/P EST MOD 30 MIN: CPT | Mod: 25,S$GLB,, | Performed by: INTERNAL MEDICINE

## 2020-10-26 RX ORDER — ZOLPIDEM TARTRATE 10 MG/1
10 TABLET ORAL NIGHTLY PRN
Qty: 30 TABLET | Refills: 5 | Status: ON HOLD | OUTPATIENT
Start: 2020-10-26 | End: 2020-12-18

## 2020-10-26 NOTE — PROGRESS NOTES
Date of Implant with Heartmate II LVAD: 3/8/2018    PATIENT ARRIVED IN CLINIC:  Ambulatory   Accompanied by: n/a    Vitals  Temperature, oral:   Temp Readings from Last 1 Encounters:   10/26/20 98.2 °F (36.8 °C) (Oral)     Blood Pressure:   BP Readings from Last 3 Encounters:   10/26/20 (!) 90/0   10/01/20 (!) 90/0   20 (!) 85/0        VAD Interrogation:  TXP SACHI INTERROGATIONS 10/26/2020 10/1/2020 2020   Type HeartMate II HeartMate II HeartMate II   Flow 4.8 5.8 4.9   Speed 9800 9800 9800   PI 3.6 3.7 2.7   Power (Jackson) 6.1 6.7 6.1   LSL 9400 - 9400   Pulsatility Pulse - Intermittent pulse       History [id number]  Problems / Issues / Alarms with VAD if any: None noted  VAD Sounds: HM2 Smooth      HCT:   Lab Results   Component Value Date    HCT 43.5 10/26/2020    HCT 38 07/10/2020       Complaints/reason for visit today: routine  Emergency Equipment With Patient: yes   VAD Binder With Patient: yes       Any Equipment Issues: None noted (Refer to  note for complete details)    DLES Assessment:  Appearance Of Driveline: 1  Antibiotics: NO  Velour: no  Manual & Visual Inspection Of Driveline: No kinks or tears noted and Tear, rescue tape applied  Stabilization Device In Use: yes, sun securement device      Patient MyChart Questionnaire:   VAD QUESTIONNAIRE ANSWERS 2020   Have you had any LVAD related issues or alarms? No   Have you had any LVAD Equipment issues? No   How often do you do your LVAD dressing change? Twice per week   What type of dressing change do you do?  Biweekly kit   Do you need dressing change supplies? No   If yes, what kind (i.e. boxes/kits)? -   Do you need any new LVAD Accessories? (i.e Battery Holster Vest, Holster Vest, RT/LT Consolidation Bag) No   If yes, what kind of accessories do you need? -   Have you been using your Monthly Equipment Checklist? Yes   Description of Stools: Normal and formed without blood   How Often: Every other day   Color  Of Urine: Clear/Yellow   Are you experiencing: Coping OK?   How many hours per night are you sleeping? 7   Do you take any medications to help you fall asleep? Yes   If yes, what medications do you take to help you fall asleep?  ambien   Are you Showering? Yes   Do you change your dressing immediately after you dry off?  Yes   Are you exercising? Yes   If so, what do you do and for how long per day? work   How often are you exercising? 4 days a week   Are you working or what do you do to stay active? work   Are you driving? Yes   Do you know that if you have an alarm while driving you need to pull over first before looking at your alarm to avoid an accident? Yes   Are you in pain? Yes   If so, please rate: 4   What hurts? chest, abdomin, hands, fingers   Describe pain: tightness, palpitations, numbness, tingling   Do you take any medications for pain?  Yes   If yes, what kind? tylenol, acetominiphen, ambien   Does this relieve the pain? Yes   How often are you taking pain medicine? daily   What can we do for you? Lower my pump speed,  change amioderone to another medication that dosen't effect my Thyroid   Is your appointment today? No   Do you have your Emergency Bag with you? -   Do you have your VAD Binder with you in clinic today?  -        Assessment:   PAIN: NO  Complaints Of Nausea / Vomiting: None noted    Appearance and Frequency Of Stools: normal and formed without blood & daily  Color Of Urine: clear/yellow  Coping/Depression/Anxiety: coping okay  Sleep Habits: 6 hrs /night  Sleep Aids: ambien and melatonin  Showering: Yes, reminded to change dressing immediately after drying off  Activity/Exercise: pt reports walking daily   Driving: Yes. Reminded to pull over should there be an alarm before looking down at controller.    DLES Dressing Care:   Frequency of Dressing Changes: daily & daily kit  Pt In Need Of Management Kits?:no   It is medically necessary to have VAD management kits in order to prevent  infection or to assist in the healing of an infected DLES.    Labs:    Chemistry        Component Value Date/Time     10/01/2020 1440    K 3.6 10/01/2020 1440     10/01/2020 1440    CO2 24 10/01/2020 1440    BUN 13 10/01/2020 1440    CREATININE 2.1 (H) 10/01/2020 1440     10/01/2020 1440        Component Value Date/Time    CALCIUM 9.2 10/01/2020 1440    ALKPHOS 111 10/01/2020 1440    AST 21 10/01/2020 1440    ALT 13 10/01/2020 1440    BILITOT 0.5 10/01/2020 1440    ESTGFRAFRICA 40.3 (A) 10/01/2020 1440    EGFRNONAA 34.9 (A) 10/01/2020 1440            Magnesium   Date Value Ref Range Status   09/24/2020 2.1 1.6 - 2.6 mg/dL Final       Lab Results   Component Value Date    WBC 5.16 10/26/2020    HGB 13.2 (L) 10/26/2020    HCT 43.5 10/26/2020    MCV 88 10/26/2020     10/26/2020       Lab Results   Component Value Date    INR 2.3 (H) 10/26/2020    INR 2.4 (H) 10/13/2020    INR 2.5 (H) 10/01/2020       BNP   Date Value Ref Range Status   10/26/2020 265 (H) 0 - 99 pg/mL Final     Comment:     Values of less than 100 pg/ml are consistent with non-CHF populations.   10/01/2020 248 (H) 0 - 99 pg/mL Final     Comment:     Values of less than 100 pg/ml are consistent with non-CHF populations.   09/29/2020 190 (H) 0 - 99 pg/mL Final     Comment:     Values of less than 100 pg/ml are consistent with non-CHF populations.       LD   Date Value Ref Range Status   09/24/2020 339 (H) 110 - 260 U/L Final     Comment:     Results are increased in hemolyzed samples.   07/30/2020 390 (H) 110 - 260 U/L Final     Comment:     Results are increased in hemolyzed samples.   07/28/2020 502 (H) 110 - 260 U/L Final     Comment:     Results are increased in hemolyzed samples.       Labs reviewed with patient: YES , few labs still pending     Patient Satisfaction Survey completed per patient: No  (explained about signature and box to check)  Medication reconciliation: per MA.  Medication Detail updated today:  yes  Coumadin Managed by: [facility]    Education: Reviewed driveline care, emergency procedures, how to change the controller, alarms with patient, as well as discussed how to page the VAD coordinator in case of an emergency.   Coved - 19 education: Reminded patient/caregiver to check temperature daily and call us if it is > 99.0.  Reminded them  to stay 6 feet away from other people, wash hands frequently, don't touch your face and stay home except yo get labs, medications, and appts.      Plans/Needs: PT doing well without any major issues.  Still reporting intermittent dizziness which Dr. Hadley aware of.  Pt will RTC in 2 months, please refer to Md note.       Hurricane Season: Yes, discussed with patient: With hurricane season approaching, we want to make sure you are fully prepared for any emergency.  Should the National Weather Service or your local authorities recommend a voluntary or mandatory evacuation of your area, The VAD team requires you to evacuate to a safe place.  Remember, when it is a mandatory evacuation, traffic will become an issue for your limited battery power.  Therefore, we strongly urge you to evacuate early.    The VAD team advises you to have the following in place before hurricane season:  Have an evacuation plan in place including places to evacuate, names and phone numbers.  This information is required to be given to the VAD coordinator.   1. Have your VAD emergency contact numbers with you.   2. Make sure your prescriptions will not run out by the end of September.   3. Make sure you have enough medications, including pills, inhalers, patches,   etc. to take, should you be gone for more than 2 weeks.   4. Make sure ALL of your batteries are fully charged.   5. Bring enough dressing change supplies to last for at least 2 weeks.   6. Bring your VAD binder with you.  Make sure your binder is updated and complete with alarms reference card, patient hand book, emergency  contact numbers, daily log sheets, etc.  If you do not have family or friends as an evacuation destination, we recommend evacuating to a safe area.   Do NOT evacuate to [facility].    The VAD team wants to stress the importance of planning for your evacuation in the event of a hurricane.  If you have any LVAD questions or issues, please contact the LVAD coordinator.

## 2020-10-26 NOTE — LETTER
October 26, 2020        Nader Chiu  [address]  [address]  [place] [address]  Phone: [phone]  Fax: [phone]             [facility]  [address]  [place] [address]  Phone: [phone]   Patient: Tim Richards   MR Number: [id number]   YOB: 1965   Date of Visit: 10/26/2020       Dear Dr. Nader Chiu    Thank you for referring Tim Richards to me for evaluation. Attached you will find relevant portions of my assessment and plan of care.    If you have questions, please do not hesitate to call me. I look forward to following Tim Richards along with you.    Sincerely,    Antonio Hadley Jr, MD    Enclosure    If you would like to receive this communication electronically, please contact [email or web address] or [phone] to request [employer] access.    [employer] is a tool which provides read-only access to select patient information with whom you have a relationship. Its easy to use and provides real time access to review your patients record including encounter summaries, notes, results, and demographic information.    If you feel you have received this communication in error or would no longer like to receive these types of communications, please e-mail [email or web address]

## 2020-10-26 NOTE — PROCEDURES
TXP [facility] INTERROGATIONS 10/26/2020 10/1/2020 9/24/2020 9/15/2020 9/15/2020 9/14/2020 9/14/2020   Type HeartMate II HeartMate II HeartMate II - - - -   Flow 4.8 5.8 4.9 - - - -   Speed 9800 9800 9800 - - - -   PI 3.6 3.7 2.7 - - - -   Power (Jackson) 6.1 6.7 6.1 - - - -   LSL 9400 - 9400 - - - -   Pulsatility Pulse - Intermittent pulse Intermittent pulse Intermittent pulse Intermittent pulse Intermittent pulse   }Interrogation of Ventricular assist device was performed with physician analysis of device parameters and review of device function. I have personally reviewed the interrogation findings and agree with findings as stated.

## 2020-10-26 NOTE — PROGRESS NOTES
Subjective:   Patient ID:  Tim Richards is a 54 y.o. male who presents for LVAD followup visit.    Implant Date: 3/8/2018  Initials:RBB     Heartmate II RPM 9800  3/9/18 outflow graft changed     INR goal: 2-3   NO Bridge with heparin  Antiplatelets: stopped on 7/2019 due to GIB.   ASA 81 mg restarted 4/23/2020    TXP SACHI INTERROGATIONS 10/26/2020   Type HeartMate II   Flow 4.8   Speed 9800   PI 3.6   Power (Jackson) 6.1   LSL 9400   Pulsatility Pulse   Interrogation of Ventricular assist device was performed with physician analysis of device parameters and review of device function. I have personally reviewed the interrogation findings and agree with findings as stated.     HPI  53 yo BM with DCM, HBP, CHF stage D s/p HM 2 comes today for planned LVAD visit.  He notes occasional palpitations but no ICD shocks.  He reports palpitations are much better since he started Ambien to assist with his sleep.  He has applied for disability currently on short-term from his company.  No LVAD alarms.  No active cardiovascular symptoms except for mild dyspnea on exertion which is stable.  Is dizzy lightheaded spells are better lately.    Review of Systems   Constitution: Positive for malaise/fatigue. Negative for chills, fever, night sweats, weight gain and weight loss.   HENT: Negative for congestion, hearing loss, hoarse voice, nosebleeds and sore throat.    Eyes: Negative for double vision, pain, vision loss in left eye and vision loss in right eye.   Cardiovascular: Positive for dyspnea on exertion (mild) and palpitations. Negative for chest pain, claudication, irregular heartbeat, leg swelling, near-syncope, orthopnea, paroxysmal nocturnal dyspnea and syncope.   Respiratory: Negative for cough, hemoptysis, shortness of breath, sleep disturbances due to breathing, snoring, sputum production and wheezing.    Endocrine: Negative for cold intolerance, heat intolerance, polydipsia and polyuria.   Hematologic/Lymphatic:  "Negative for bleeding problem. Does not bruise/bleed easily.   Musculoskeletal: Negative for falls, muscle cramps, myalgias and neck pain.   Gastrointestinal: Negative for bloating, abdominal pain, change in bowel habit, constipation, diarrhea, hematemesis, hematochezia, jaundice, melena and nausea.   Genitourinary: Negative for bladder incontinence, dysuria, frequency, hematuria, hesitancy, incomplete emptying and urgency.   Neurological: Positive for dizziness, headaches (sometimes) and light-headedness. Negative for brief paralysis, focal weakness, numbness, paresthesias, seizures and weakness.   Psychiatric/Behavioral: Negative for depression and memory loss. The patient has insomnia (Controlled with Ambien) and is nervous/anxious.      Objective:   Blood pressure (!) 90/0, temperature 98.2 °F (36.8 °C), temperature source Oral, height 6' 1" (1.854 m), weight 125.2 kg (276 lb).body mass index is 36.41 kg/m².  Doppler: 90 mm Hg  Physical Exam   Constitutional: No distress.   BP (!) 90/0--definitely pulsatile but cuff unable to obtain reading-- (BP Location: Left arm, Patient Position: Sitting) Comment (BP Method): doppler  Temp 98.2 °F (36.8 °C) (Oral)   Ht 6' 1" (1.854 m)   Wt 125.2 kg (276 lb)   BMI 36.41 kg/m²   Overweight, friendly black male in no acute distress   HENT:   Head: Normocephalic and atraumatic.   Eyes: Conjunctivae are normal. No scleral icterus.   Neck: No JVD present. No thyromegaly present.   Cardiovascular:   Normal LVAD sounds; driveline 1   Pulmonary/Chest: Effort normal and breath sounds normal. No respiratory distress. He has no wheezes. He has no rales.   Abdominal: Soft. Bowel sounds are normal. He exhibits no distension and no mass. There is no abdominal tenderness. There is no rebound and no guarding.   Musculoskeletal:         General: No tenderness or edema.   Neurological: He is alert.   Skin: Skin is warm and dry. He is not diaphoretic.   Psychiatric: He has a normal mood and " affect. His behavior is normal. Judgment and thought content normal.     Lab Results   Component Value Date     (H) 10/26/2020     10/26/2020    K 3.7 10/26/2020    MG 2.0 10/26/2020     10/26/2020    CO2 24 10/26/2020    BUN 21 (H) 10/26/2020    CREATININE 1.9 (H) 10/26/2020    GLU 86 10/26/2020    HGBA1C 5.5 03/08/2018    AST 24 10/26/2020    ALT 16 10/26/2020    ALBUMIN 4.1 10/26/2020    PROT 8.1 10/26/2020    BILITOT 0.4 10/26/2020    WBC 5.16 10/26/2020    HGB 13.2 (L) 10/26/2020    HCT 43.5 10/26/2020    HCT 38 07/10/2020     10/26/2020    INR 2.3 (H) 10/26/2020    INR 2.1 07/31/2019     (H) 10/26/2020    TSH 6.449 (H) 09/24/2020    CHOL 259 (H) 04/23/2020     (H) 04/23/2020    LDLCALC 112.8 04/23/2020    TRIG 126 04/23/2020    P2ERLEW 5.3 04/23/2020    FREET4 1.21 09/24/2020     Assessment:      1. Presence of left ventricular assist device (LVAD)    2. Chronic combined systolic and diastolic heart failure    3. NICM (nonischemic cardiomyopathy)    4. Hypertensive cardiovascular-renal disease, stage 1-4 or unspecified chronic kidney disease, with heart failure    5. Stage 3a chronic kidney disease    6. High risk medications (amiodarone) long-term use    7. Anticoagulation monitoring, INR range 2-3    8. Insomnia, unspecified type      Plan:   Diuresis, wt loss, low sodium diet and fluid restriction reviewed along with daily weights and how to respond to 3# weight gain with prn diuretics.  If this fails to restore dry weight call us within 2-3 days.     I will not change blood pressure medications today particularly with him being so pulsatile even though the cuff could not obtain a reading    Patient is now NYHA II    Listed for transplant: No    UNOS Patient Status  Functional Status: 70% - Cares for self: unable to carry on normal activity or active work  Physical Capacity: No Limitations  Working for Income: No  If no, reason not working: Disability

## 2020-11-02 ENCOUNTER — PATIENT MESSAGE (OUTPATIENT)
Dept: TRANSPLANT | Facility: CLINIC | Age: 55
End: 2020-11-02

## 2020-11-03 ENCOUNTER — TELEPHONE (OUTPATIENT)
Dept: TRANSPLANT | Facility: CLINIC | Age: 55
End: 2020-11-03

## 2020-11-03 NOTE — TELEPHONE ENCOUNTER
"Mr. Richards has sent a Epic messages asking that we "The VAD Team" compose a letter to send to his employer stating that he is not physically able to work anymore. I have called his wife and she called him on 3-way so that I can explain that legally we can not do that. Mr. Richards is "physcially" able to return work but medically he has been having some issues with dizzinesses, trouble standing and walking long term etc. Mr. Richards stated that when he initially started feeling this way Dr. Bautista agreed that he should not return to work. I've let Mr. Richards and his wife know that I will not be writing the letter and will have to speak with Dr. Bautista further about this matter and whether or not he can or cannot return to work.  "

## 2020-11-05 ENCOUNTER — PATIENT MESSAGE (OUTPATIENT)
Dept: TRANSPLANT | Facility: CLINIC | Age: 55
End: 2020-11-05

## 2020-11-05 ENCOUNTER — ANTI-COAG VISIT (OUTPATIENT)
Dept: CARDIOLOGY | Facility: CLINIC | Age: 55
End: 2020-11-05
Payer: COMMERCIAL

## 2020-11-05 ENCOUNTER — LAB VISIT (OUTPATIENT)
Dept: LAB | Facility: HOSPITAL | Age: 55
End: 2020-11-05
Attending: INTERNAL MEDICINE
Payer: COMMERCIAL

## 2020-11-05 DIAGNOSIS — Z95.811 LVAD (LEFT VENTRICULAR ASSIST DEVICE) PRESENT: ICD-10-CM

## 2020-11-05 DIAGNOSIS — Z79.01 ANTICOAGULATION MONITORING, INR RANGE 2-3: ICD-10-CM

## 2020-11-05 LAB
INR PPP: 2.5 (ref 0.8–1.2)
PROTHROMBIN TIME: 27.6 SEC (ref 9–12.5)

## 2020-11-05 PROCEDURE — 93793 PR ANTICOAGULANT MGMT FOR PT TAKING WARFARIN: ICD-10-PCS | Mod: S$GLB,,,

## 2020-11-05 PROCEDURE — 85610 PROTHROMBIN TIME: CPT

## 2020-11-05 PROCEDURE — 36415 COLL VENOUS BLD VENIPUNCTURE: CPT

## 2020-11-05 PROCEDURE — 93793 ANTICOAG MGMT PT WARFARIN: CPT | Mod: S$GLB,,,

## 2020-11-09 ENCOUNTER — PATIENT MESSAGE (OUTPATIENT)
Dept: ENDOCRINOLOGY | Facility: CLINIC | Age: 55
End: 2020-11-09

## 2020-11-12 ENCOUNTER — ANTI-COAG VISIT (OUTPATIENT)
Dept: CARDIOLOGY | Facility: CLINIC | Age: 55
End: 2020-11-12
Payer: COMMERCIAL

## 2020-11-12 ENCOUNTER — PATIENT MESSAGE (OUTPATIENT)
Dept: ENDOCRINOLOGY | Facility: CLINIC | Age: 55
End: 2020-11-12

## 2020-11-12 ENCOUNTER — LAB VISIT (OUTPATIENT)
Dept: LAB | Facility: HOSPITAL | Age: 55
End: 2020-11-12
Attending: INTERNAL MEDICINE
Payer: COMMERCIAL

## 2020-11-12 DIAGNOSIS — Z79.01 ANTICOAGULATION MONITORING, INR RANGE 2-3: ICD-10-CM

## 2020-11-12 DIAGNOSIS — Z95.811 LVAD (LEFT VENTRICULAR ASSIST DEVICE) PRESENT: Primary | ICD-10-CM

## 2020-11-12 DIAGNOSIS — E03.2 HYPOTHYROIDISM DUE TO AMIODARONE: Primary | ICD-10-CM

## 2020-11-12 DIAGNOSIS — T46.2X1A HYPOTHYROIDISM DUE TO AMIODARONE: ICD-10-CM

## 2020-11-12 DIAGNOSIS — E03.2 HYPOTHYROIDISM DUE TO AMIODARONE: ICD-10-CM

## 2020-11-12 DIAGNOSIS — Z95.811 LVAD (LEFT VENTRICULAR ASSIST DEVICE) PRESENT: ICD-10-CM

## 2020-11-12 DIAGNOSIS — T46.2X1A HYPOTHYROIDISM DUE TO AMIODARONE: Primary | ICD-10-CM

## 2020-11-12 LAB
INR PPP: 2 (ref 0.8–1.2)
PROTHROMBIN TIME: 22.6 SEC (ref 9–12.5)
T4 FREE SERPL-MCNC: 1.21 NG/DL (ref 0.71–1.51)
TSH SERPL DL<=0.005 MIU/L-ACNC: 6.87 UIU/ML (ref 0.4–4)

## 2020-11-12 PROCEDURE — 85610 PROTHROMBIN TIME: CPT

## 2020-11-12 PROCEDURE — 84443 ASSAY THYROID STIM HORMONE: CPT

## 2020-11-12 PROCEDURE — 84439 ASSAY OF FREE THYROXINE: CPT

## 2020-11-12 PROCEDURE — 36415 COLL VENOUS BLD VENIPUNCTURE: CPT

## 2020-11-12 PROCEDURE — 93793 PR ANTICOAGULANT MGMT FOR PT TAKING WARFARIN: ICD-10-PCS | Mod: S$GLB,,,

## 2020-11-12 PROCEDURE — 93793 ANTICOAG MGMT PT WARFARIN: CPT | Mod: S$GLB,,,

## 2020-11-12 RX ORDER — LEVOTHYROXINE SODIUM 75 UG/1
75 TABLET ORAL
Qty: 90 TABLET | Refills: 4 | Status: SHIPPED | OUTPATIENT
Start: 2020-11-12 | End: 2020-12-31

## 2020-11-12 NOTE — PROGRESS NOTES
Patient states he will be out of town 11/17 and can't go to lab until 11/19, chart routed to pharmd to review

## 2020-11-13 ENCOUNTER — PATIENT MESSAGE (OUTPATIENT)
Dept: TRANSPLANT | Facility: CLINIC | Age: 55
End: 2020-11-13

## 2020-11-25 ENCOUNTER — PATIENT MESSAGE (OUTPATIENT)
Dept: CARDIOLOGY | Facility: CLINIC | Age: 55
End: 2020-11-25

## 2020-11-25 ENCOUNTER — ANTI-COAG VISIT (OUTPATIENT)
Dept: CARDIOLOGY | Facility: CLINIC | Age: 55
End: 2020-11-25
Payer: COMMERCIAL

## 2020-11-25 ENCOUNTER — LAB VISIT (OUTPATIENT)
Dept: LAB | Facility: HOSPITAL | Age: 55
End: 2020-11-25
Attending: INTERNAL MEDICINE
Payer: COMMERCIAL

## 2020-11-25 DIAGNOSIS — Z95.811 LVAD (LEFT VENTRICULAR ASSIST DEVICE) PRESENT: ICD-10-CM

## 2020-11-25 DIAGNOSIS — Z79.01 ANTICOAGULATION MONITORING, INR RANGE 2-3: ICD-10-CM

## 2020-11-25 LAB
INR PPP: 2.3 (ref 0.8–1.2)
PROTHROMBIN TIME: 25.3 SEC (ref 9–12.5)

## 2020-11-25 PROCEDURE — 93793 PR ANTICOAGULANT MGMT FOR PT TAKING WARFARIN: ICD-10-PCS | Mod: S$GLB,,,

## 2020-11-25 PROCEDURE — 36415 COLL VENOUS BLD VENIPUNCTURE: CPT

## 2020-11-25 PROCEDURE — 93793 ANTICOAG MGMT PT WARFARIN: CPT | Mod: S$GLB,,,

## 2020-11-25 PROCEDURE — 85610 PROTHROMBIN TIME: CPT

## 2020-11-25 RX ORDER — WARFARIN SODIUM 5 MG/1
TABLET ORAL
Qty: 45 TABLET | Refills: 11 | Status: ON HOLD | OUTPATIENT
Start: 2020-11-25 | End: 2021-02-25 | Stop reason: SDUPTHER

## 2020-11-25 NOTE — PROGRESS NOTES
Caregiver called back and advised to have Patient go to lab asap today, appointment booked at [facility]

## 2020-11-25 NOTE — PROGRESS NOTES
Patient missed lab , can go today at [facility] if ok to go today, also is out of Warfarin, needs Rx called in to [facility] Ph # [phone] for Warfarin -5mg tablets, takinmg daily except 7.5mg Tuesday and Thursday since 2 weeks ago, Patient call back # [phone]

## 2020-12-02 ENCOUNTER — LAB VISIT (OUTPATIENT)
Dept: LAB | Facility: HOSPITAL | Age: 55
End: 2020-12-02
Attending: INTERNAL MEDICINE
Payer: COMMERCIAL

## 2020-12-02 ENCOUNTER — ANTI-COAG VISIT (OUTPATIENT)
Dept: CARDIOLOGY | Facility: CLINIC | Age: 55
End: 2020-12-02
Payer: COMMERCIAL

## 2020-12-02 DIAGNOSIS — Z79.01 ANTICOAGULATION MONITORING, INR RANGE 2-3: ICD-10-CM

## 2020-12-02 DIAGNOSIS — Z95.811 LVAD (LEFT VENTRICULAR ASSIST DEVICE) PRESENT: ICD-10-CM

## 2020-12-02 DIAGNOSIS — Z95.811 LVAD (LEFT VENTRICULAR ASSIST DEVICE) PRESENT: Chronic | ICD-10-CM

## 2020-12-02 LAB
INR PPP: 1.8 (ref 0.8–1.2)
PROTHROMBIN TIME: 19.6 SEC (ref 9–12.5)

## 2020-12-02 PROCEDURE — 36415 COLL VENOUS BLD VENIPUNCTURE: CPT

## 2020-12-02 PROCEDURE — 93793 ANTICOAG MGMT PT WARFARIN: CPT | Mod: S$GLB,,,

## 2020-12-02 PROCEDURE — 85610 PROTHROMBIN TIME: CPT

## 2020-12-02 PROCEDURE — 93793 PR ANTICOAGULANT MGMT FOR PT TAKING WARFARIN: ICD-10-PCS | Mod: S$GLB,,,

## 2020-12-07 ENCOUNTER — LAB VISIT (OUTPATIENT)
Dept: LAB | Facility: HOSPITAL | Age: 55
End: 2020-12-07
Attending: INTERNAL MEDICINE
Payer: COMMERCIAL

## 2020-12-07 ENCOUNTER — ANTI-COAG VISIT (OUTPATIENT)
Dept: CARDIOLOGY | Facility: CLINIC | Age: 55
End: 2020-12-07
Payer: COMMERCIAL

## 2020-12-07 DIAGNOSIS — Z95.811 LVAD (LEFT VENTRICULAR ASSIST DEVICE) PRESENT: ICD-10-CM

## 2020-12-07 DIAGNOSIS — Z95.811 LVAD (LEFT VENTRICULAR ASSIST DEVICE) PRESENT: Chronic | ICD-10-CM

## 2020-12-07 DIAGNOSIS — Z79.01 ANTICOAGULATION MONITORING, INR RANGE 2-3: ICD-10-CM

## 2020-12-07 LAB
INR PPP: 2.1 (ref 0.8–1.2)
PROTHROMBIN TIME: 23.2 SEC (ref 9–12.5)

## 2020-12-07 PROCEDURE — 36415 COLL VENOUS BLD VENIPUNCTURE: CPT

## 2020-12-07 PROCEDURE — 85610 PROTHROMBIN TIME: CPT

## 2020-12-07 PROCEDURE — 93793 ANTICOAG MGMT PT WARFARIN: CPT | Mod: S$GLB,,,

## 2020-12-07 PROCEDURE — 93793 PR ANTICOAGULANT MGMT FOR PT TAKING WARFARIN: ICD-10-PCS | Mod: S$GLB,,,

## 2020-12-10 NOTE — PROGRESS NOTES
Wife called 12/10/20 to christa appt 12/09/20 to 12/14/20. Also pt took 12/08/20 (7.5/mg) Warfarin instead of (5mg). He has already taken today (7.5mg). Please advise.

## 2020-12-14 ENCOUNTER — LAB VISIT (OUTPATIENT)
Dept: LAB | Facility: HOSPITAL | Age: 55
End: 2020-12-14
Attending: INTERNAL MEDICINE
Payer: COMMERCIAL

## 2020-12-14 ENCOUNTER — ANTI-COAG VISIT (OUTPATIENT)
Dept: CARDIOLOGY | Facility: CLINIC | Age: 55
End: 2020-12-14
Payer: COMMERCIAL

## 2020-12-14 DIAGNOSIS — Z95.811 LVAD (LEFT VENTRICULAR ASSIST DEVICE) PRESENT: ICD-10-CM

## 2020-12-14 DIAGNOSIS — Z79.01 ANTICOAGULATION MONITORING, INR RANGE 2-3: ICD-10-CM

## 2020-12-14 LAB
INR PPP: 2.4 (ref 0.8–1.2)
PROTHROMBIN TIME: 27 SEC (ref 9–12.5)

## 2020-12-14 PROCEDURE — 93793 ANTICOAG MGMT PT WARFARIN: CPT | Mod: S$GLB,,,

## 2020-12-14 PROCEDURE — 36415 COLL VENOUS BLD VENIPUNCTURE: CPT

## 2020-12-14 PROCEDURE — 85610 PROTHROMBIN TIME: CPT

## 2020-12-14 PROCEDURE — 93793 PR ANTICOAGULANT MGMT FOR PT TAKING WARFARIN: ICD-10-PCS | Mod: S$GLB,,,

## 2020-12-16 ENCOUNTER — CLINICAL SUPPORT (OUTPATIENT)
Dept: CARDIOLOGY | Facility: HOSPITAL | Age: 55
End: 2020-12-16
Payer: COMMERCIAL

## 2020-12-16 DIAGNOSIS — Z95.810 PRESENCE OF AUTOMATIC (IMPLANTABLE) CARDIAC DEFIBRILLATOR: ICD-10-CM

## 2020-12-16 PROCEDURE — 93295 CARDIAC DEVICE CHECK - REMOTE: ICD-10-PCS | Mod: ,,, | Performed by: INTERNAL MEDICINE

## 2020-12-16 PROCEDURE — 93296 REM INTERROG EVL PM/IDS: CPT | Performed by: INTERNAL MEDICINE

## 2020-12-16 PROCEDURE — 93295 DEV INTERROG REMOTE 1/2/MLT: CPT | Mod: ,,, | Performed by: INTERNAL MEDICINE

## 2020-12-18 ENCOUNTER — TELEPHONE (OUTPATIENT)
Dept: ELECTROPHYSIOLOGY | Facility: CLINIC | Age: 55
End: 2020-12-18

## 2020-12-18 ENCOUNTER — CLINICAL SUPPORT (OUTPATIENT)
Dept: CARDIOLOGY | Facility: HOSPITAL | Age: 55
End: 2020-12-18
Attending: INTERNAL MEDICINE
Payer: COMMERCIAL

## 2020-12-18 ENCOUNTER — ANTI-COAG VISIT (OUTPATIENT)
Dept: CARDIOLOGY | Facility: CLINIC | Age: 55
End: 2020-12-18
Payer: COMMERCIAL

## 2020-12-18 ENCOUNTER — OFFICE VISIT (OUTPATIENT)
Dept: ELECTROPHYSIOLOGY | Facility: CLINIC | Age: 55
End: 2020-12-18
Payer: COMMERCIAL

## 2020-12-18 VITALS — WEIGHT: 280.88 LBS | HEIGHT: 73 IN | HEART RATE: 83 BPM | BODY MASS INDEX: 37.22 KG/M2

## 2020-12-18 DIAGNOSIS — I42.8 NICM (NONISCHEMIC CARDIOMYOPATHY): Chronic | ICD-10-CM

## 2020-12-18 DIAGNOSIS — T82.9XXA COMPLICATION INVOLVING LEFT VENTRICULAR ASSIST DEVICE (LVAD), INITIAL ENCOUNTER: ICD-10-CM

## 2020-12-18 DIAGNOSIS — I50.42 CHRONIC COMBINED SYSTOLIC AND DIASTOLIC HEART FAILURE: ICD-10-CM

## 2020-12-18 DIAGNOSIS — Z95.811 LVAD (LEFT VENTRICULAR ASSIST DEVICE) PRESENT: ICD-10-CM

## 2020-12-18 DIAGNOSIS — Z95.811 LVAD (LEFT VENTRICULAR ASSIST DEVICE) PRESENT: Primary | Chronic | ICD-10-CM

## 2020-12-18 DIAGNOSIS — Z79.01 ANTICOAGULATION MONITORING, INR RANGE 2-3: ICD-10-CM

## 2020-12-18 DIAGNOSIS — I49.01 VF (VENTRICULAR FIBRILLATION): ICD-10-CM

## 2020-12-18 DIAGNOSIS — I10 ESSENTIAL HYPERTENSION: Chronic | ICD-10-CM

## 2020-12-18 PROCEDURE — 99214 PR OFFICE/OUTPT VISIT, EST, LEVL IV, 30-39 MIN: ICD-10-PCS | Mod: S$GLB,,, | Performed by: INTERNAL MEDICINE

## 2020-12-18 PROCEDURE — 93005 RHYTHM STRIP: ICD-10-PCS | Mod: S$GLB,,, | Performed by: INTERNAL MEDICINE

## 2020-12-18 PROCEDURE — 93793 PR ANTICOAGULANT MGMT FOR PT TAKING WARFARIN: ICD-10-PCS | Mod: S$GLB,,,

## 2020-12-18 PROCEDURE — 93261 CARDIAC DEVICE CHECK - IN CLINIC & HOSPITAL: ICD-10-PCS | Mod: 26,,, | Performed by: INTERNAL MEDICINE

## 2020-12-18 PROCEDURE — 99999 PR PBB SHADOW E&M-EST. PATIENT-LVL III: ICD-10-PCS | Mod: PBBFAC,,, | Performed by: INTERNAL MEDICINE

## 2020-12-18 PROCEDURE — 93793 ANTICOAG MGMT PT WARFARIN: CPT | Mod: S$GLB,,,

## 2020-12-18 PROCEDURE — 99999 PR PBB SHADOW E&M-EST. PATIENT-LVL III: CPT | Mod: PBBFAC,,, | Performed by: INTERNAL MEDICINE

## 2020-12-18 PROCEDURE — 93261 INTERROGATE SUBQ DEFIB: CPT | Mod: 26,,, | Performed by: INTERNAL MEDICINE

## 2020-12-18 PROCEDURE — 93261 INTERROGATE SUBQ DEFIB: CPT

## 2020-12-18 PROCEDURE — 93005 ELECTROCARDIOGRAM TRACING: CPT | Mod: S$GLB,,, | Performed by: INTERNAL MEDICINE

## 2020-12-18 PROCEDURE — 99214 OFFICE O/P EST MOD 30 MIN: CPT | Mod: S$GLB,,, | Performed by: INTERNAL MEDICINE

## 2020-12-18 PROCEDURE — 93010 ELECTROCARDIOGRAM REPORT: CPT | Mod: S$GLB,,, | Performed by: INTERNAL MEDICINE

## 2020-12-18 PROCEDURE — 93010 RHYTHM STRIP: ICD-10-PCS | Mod: S$GLB,,, | Performed by: INTERNAL MEDICINE

## 2020-12-18 NOTE — TELEPHONE ENCOUNTER
Called patient in relation to missed appointment today. Clinic number given for patient to return call to clinic

## 2020-12-18 NOTE — PROGRESS NOTES
Electrophysiology Clinic Note   Reason for visit: Device management/ ICD reimplant     HPI:   Tim Richards is a 55 y.o. male with a significant cardiac history that includes   non ischemic cardiomyopathy with severely reduced LVEF s/p ICD ( 2014)  and  s/p HM 2 implant (3/2018), history of VT and VF who presents here today following hospital discharge.  He had a generator change and ICD lead revision on March 9, 2020. He was seen in clinic for follow up and was noted to have drainage from the ICD site. He was subsequently admitted in the inpatient setting for management of  ICD pocket infection. He underwent success device extraction on 7/10/2020 with residual left chest wall pocket hematoma. He was transfused with RBC but did well and was discharged home on IV antibiotics and a lifevest with plans for reimplant after course of antibiotics prescribed by ID. While hospitalized he was evaluated for S-ICD and met the requirement for implant and presents today to discuss that implant. Today he reports that he is stable. He denies any episodes of fevers, chills, night sweats or drainage from pocket.  He reports that he had an alarm from the life vest and was able to abort therapy. Review of the event showed what appeared to be artifact. He followed up with ID and has completed the recommended course of antibiotics and had PICC removed yesterday. He has a life vest and reports adherence to the life vest. He is here today with his spouse.     Update 12/2020:   SICD implanted 9/14/2020. Since, no problems.   His SICD lead is under advisory (fx risk). Tested fine today.    My interpretation of today's ECG is NSR        Past Medical History:   Diagnosis Date    Anticoagulant long-term use     CHF (congestive heart failure)     Dilated cardiomyopathy 1/10/2018    Disorder of kidney and ureter     CKD    Encounter for blood transfusion     Gout     HTN (hypertension)     Thyroid disease     Ventricular  tachycardia (paroxysmal)         Social History     Socioeconomic History    Marital status:      Spouse name: Not on file    Number of children: Not on file    Years of education: Not on file    Highest education level: Not on file   Occupational History     Employer: [employer]    Social Needs    Financial resource strain: Not hard at all    Food insecurity     Worry: Never true     Inability: Never true    Transportation needs     Medical: No     Non-medical: No   Tobacco Use    Smoking status: Former Smoker     Packs/day: 1.00     Years: 31.00     Pack years: 31.00     Types: Cigarettes     Quit date: 2018     Years since quittin.9    Smokeless tobacco: Never Used    Tobacco comment: pt is quiting on his own - pt stated not qualified for program;  pt  quit on his own   Substance and Sexual Activity    Alcohol use: No     Alcohol/week: 0.0 standard drinks     Frequency: Never     Drinks per session: Patient refused     Binge frequency: Never     Comment: one fifth of gin or rum/week    Drug use: No    Sexual activity: Yes     Partners: Female     Birth control/protection: None     Comment: 10/5/17  with same partner 7 years    Lifestyle    Physical activity     Days per week: Not on file     Minutes per session: 60 min    Stress: Very much   Relationships    Social connections     Talks on phone: More than three times a week     Gets together: More than three times a week     Attends [employer] service: Not on file     Active member of club or organization: Yes     Attends meetings of clubs or organizations: More than 4 times per year     Relationship status:    Other Topics Concern    Not on file   Social History Narrative    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 ) 30 tablet 5    amiodarone (PACERONE) 200 MG Tab Take 1 tablet (200 mg total) by mouth once daily. 30 tablet 11    amLODIPine (NORVASC) 10 MG tablet TAKE 1 TABLET BY MOUTH EVERY DAY 90 tablet 3    doxazosin (CARDURA) 8 MG Tab Take 1 tablet (8 mg total) by mouth every 12 (twelve) hours. 60 tablet 11    ferrous gluconate (FERGON) 324 MG tablet TAKE 1 TABLET BY MOUTH DAILY WITH LUNCH. 270 tablet 1    guanFACINE (TENEX) 2 MG tablet Take 1 tablet (2 mg total) by mouth every evening. 30 tablet 11    levothyroxine (SYNTHROID) 75 MCG tablet Take 1 tablet (75 mcg total) by mouth before breakfast. 90 tablet 4    pantoprazole (PROTONIX) 40 MG tablet TAKE 1 TABLET (40 MG TOTAL) BY MOUTH ONCE DAILY. (Patient taking differently: Take 40 mg by mouth daily with lunch. ) 90 tablet 3    paroxetine (PAXIL) 10 MG tablet TAKE 1 TABLET BY MOUTH EVERY DAY IN THE MORNING 90 tablet 1    potassium chloride SA (K-DUR,KLOR-CON) 20 MEQ tablet Take 1 tablet (20 mEq total) by mouth 2 (two) times daily. 30 tablet 11    sildenafiL (VIAGRA) 100 MG tablet Take 1 tablet (100 mg total) by mouth daily as needed for Erectile Dysfunction. 10 tablet 1    torsemide (DEMADEX) 20 MG Tab Take 2 tablets (40 mg total) by mouth once daily. 30 tablet 11    vitamin D (VITAMIN D3) 1000 units Tab Take 1,000 Units by mouth once daily.      warfarin (COUMADIN) 5 MG tablet Take 1 tablet (5mg) by mouth daily except 1 and 1/2 tablets (7.5mg) on Thursday or as directed by coumadin clinic (Patient taking differently: Take 1 tablet (5mg) by mouth daily except 1 and 1/2 tablets (7.5mg) on Thursday and Sunday or as directed by coumadin clinic) 45 tablet 11    zolpidem (AMBIEN) 10 mg Tab  Take 1 tablet (10 mg total) by mouth nightly as needed. 30 tablet 5    oxyCODONE-acetaminophen (PERCOCET) 5-325 mg per tablet Take 1-2 tablets every 4 hours as needed for pain (Patient not taking: Reported on 12/18/2020) 28 tablet 0     No current facility-administered medications for this visit.         Constitutional: (-)fevers, (-)chills, (-)night sweats  HEENT: (-) headaches (-) lightheadedness (-) blurry vision, (-) nose bleeds   Cardiovascular: (-)chest pain (-)paroxysmal nocturnal dyspnea (-)orthopnea, chest wall pain,   Respiratory: (-)shortness of breath, (+)dyspnea on exertion (-)hemoptysis  Gastrointestinal: (-)abdominal pain (-)nausea (-)vomiting (-)hematemesis    Musculoskeletal: (-)arthralgias (+)limited range of motion  Neurologic: (-)parasthesias (-)mood disorder (-)anxiety  Endo: (-)polyuria (-)polydipsia (-)heat/cold intolerance  Skin: (-)rash     Physical Exam:   Wt Readings from Last 3 Encounters:   12/18/20 127.4 kg (280 lb 13.9 oz)   10/26/20 125.2 kg (276 lb)   09/24/20 127.5 kg (281 lb)     Temp Readings from Last 3 Encounters:   10/26/20 98.2 °F (36.8 °C) (Oral)   09/24/20 99 °F (37.2 °C) (Oral)   09/15/20 97.7 °F (36.5 °C) (Temporal)     BP Readings from Last 3 Encounters:   10/26/20 (!) 90/0   10/01/20 (!) 90/0   09/24/20 (!) 85/0     Pulse Readings from Last 3 Encounters:   12/18/20 83   09/15/20 (!) 56   08/05/20 80       NAD  RRR. LVAD in place.  no wheeze. No resp distress.  no abd distension     Most recent Echo:  7/10/2020 Severe left ventricular systolic dysfunction.  The LVEF 10%.      Assessment/Plan:  Tim Richards is a 55 y.o. male with non ischemic cardiomyoapthy s/p HM II,  History of VT and VF, and recent ICD explant due to infection who presented today to discuss reimplantation of ICD. He has been stable since explant about 3 weeks ago and cleared from infectious standpoint for a new implant by ID. He is currently anticoagulated and he in light of his high infection  risk, plan is to implant S-ICD to avoid placing intracardiac leads. Will also perform defibrillator testing at time of implant. Plans were discussed extensive with both patient and spouse who expressed that they understood.       #Hx of VT/VT - on amiodarone has SICD  # NICM s/p LVAD HM II   # S/p BiV explant complicated by chest wall hematoma     Continue to follow SICD in device clinic. Monitoring lead given advisory from [employer].  Return in 1 year, or earlier prn.

## 2020-12-30 ENCOUNTER — OFFICE VISIT (OUTPATIENT)
Dept: TRANSPLANT | Facility: CLINIC | Age: 55
End: 2020-12-30
Payer: COMMERCIAL

## 2020-12-30 ENCOUNTER — LAB VISIT (OUTPATIENT)
Dept: LAB | Facility: HOSPITAL | Age: 55
End: 2020-12-30
Attending: INTERNAL MEDICINE
Payer: COMMERCIAL

## 2020-12-30 ENCOUNTER — PATIENT MESSAGE (OUTPATIENT)
Dept: ENDOCRINOLOGY | Facility: CLINIC | Age: 55
End: 2020-12-30

## 2020-12-30 ENCOUNTER — CLINICAL SUPPORT (OUTPATIENT)
Dept: TRANSPLANT | Facility: CLINIC | Age: 55
End: 2020-12-30
Payer: COMMERCIAL

## 2020-12-30 ENCOUNTER — ANTI-COAG VISIT (OUTPATIENT)
Dept: CARDIOLOGY | Facility: CLINIC | Age: 55
End: 2020-12-30
Payer: COMMERCIAL

## 2020-12-30 VITALS — WEIGHT: 282 LBS | HEIGHT: 73 IN | TEMPERATURE: 99 F | BODY MASS INDEX: 37.37 KG/M2 | SYSTOLIC BLOOD PRESSURE: 78 MMHG

## 2020-12-30 DIAGNOSIS — T46.2X1A HYPOTHYROIDISM DUE TO AMIODARONE: Primary | ICD-10-CM

## 2020-12-30 DIAGNOSIS — Z95.811 LVAD (LEFT VENTRICULAR ASSIST DEVICE) PRESENT: Chronic | ICD-10-CM

## 2020-12-30 DIAGNOSIS — I50.42 CHRONIC COMBINED SYSTOLIC AND DIASTOLIC HEART FAILURE: ICD-10-CM

## 2020-12-30 DIAGNOSIS — Z95.811 LVAD (LEFT VENTRICULAR ASSIST DEVICE) PRESENT: Primary | Chronic | ICD-10-CM

## 2020-12-30 DIAGNOSIS — T46.2X1A HYPOTHYROIDISM DUE TO AMIODARONE: ICD-10-CM

## 2020-12-30 DIAGNOSIS — I42.8 NICM (NONISCHEMIC CARDIOMYOPATHY): Chronic | ICD-10-CM

## 2020-12-30 DIAGNOSIS — Z95.811 LVAD (LEFT VENTRICULAR ASSIST DEVICE) PRESENT: ICD-10-CM

## 2020-12-30 DIAGNOSIS — E03.2 HYPOTHYROIDISM DUE TO AMIODARONE: Primary | ICD-10-CM

## 2020-12-30 DIAGNOSIS — I10 ESSENTIAL HYPERTENSION: ICD-10-CM

## 2020-12-30 DIAGNOSIS — Z79.01 ANTICOAGULATION MONITORING, INR RANGE 2-3: ICD-10-CM

## 2020-12-30 DIAGNOSIS — E03.2 HYPOTHYROIDISM DUE TO AMIODARONE: ICD-10-CM

## 2020-12-30 LAB
ALBUMIN SERPL BCP-MCNC: 4.1 G/DL (ref 3.5–5.2)
ALP SERPL-CCNC: 136 U/L (ref 55–135)
ALT SERPL W/O P-5'-P-CCNC: 16 U/L (ref 10–44)
ANION GAP SERPL CALC-SCNC: 13 MMOL/L (ref 8–16)
AST SERPL-CCNC: 22 U/L (ref 10–40)
BASOPHILS # BLD AUTO: 0.02 K/UL (ref 0–0.2)
BASOPHILS NFR BLD: 0.5 % (ref 0–1.9)
BILIRUB DIRECT SERPL-MCNC: 0.2 MG/DL (ref 0.1–0.3)
BILIRUB SERPL-MCNC: 0.5 MG/DL (ref 0.1–1)
BNP SERPL-MCNC: 125 PG/ML (ref 0–99)
BUN SERPL-MCNC: 16 MG/DL (ref 6–20)
CALCIUM SERPL-MCNC: 9.2 MG/DL (ref 8.7–10.5)
CHLORIDE SERPL-SCNC: 105 MMOL/L (ref 95–110)
CO2 SERPL-SCNC: 21 MMOL/L (ref 23–29)
CREAT SERPL-MCNC: 1.8 MG/DL (ref 0.5–1.4)
CRP SERPL-MCNC: 12.3 MG/L (ref 0–8.2)
DIFFERENTIAL METHOD: ABNORMAL
EOSINOPHIL # BLD AUTO: 0.1 K/UL (ref 0–0.5)
EOSINOPHIL NFR BLD: 1.6 % (ref 0–8)
ERYTHROCYTE [DISTWIDTH] IN BLOOD BY AUTOMATED COUNT: 16.2 % (ref 11.5–14.5)
EST. GFR  (AFRICAN AMERICAN): 48.3 ML/MIN/1.73 M^2
EST. GFR  (NON AFRICAN AMERICAN): 41.7 ML/MIN/1.73 M^2
GLUCOSE SERPL-MCNC: 95 MG/DL (ref 70–110)
HCT VFR BLD AUTO: 44.2 % (ref 40–54)
HGB BLD-MCNC: 13.3 G/DL (ref 14–18)
IMM GRANULOCYTES # BLD AUTO: 0.02 K/UL (ref 0–0.04)
IMM GRANULOCYTES NFR BLD AUTO: 0.5 % (ref 0–0.5)
INR PPP: 2.5 (ref 0.8–1.2)
LDH SERPL L TO P-CCNC: 279 U/L (ref 110–260)
LYMPHOCYTES # BLD AUTO: 1.2 K/UL (ref 1–4.8)
LYMPHOCYTES NFR BLD: 27.4 % (ref 18–48)
MAGNESIUM SERPL-MCNC: 2.2 MG/DL (ref 1.6–2.6)
MCH RBC QN AUTO: 26.5 PG (ref 27–31)
MCHC RBC AUTO-ENTMCNC: 30.1 G/DL (ref 32–36)
MCV RBC AUTO: 88 FL (ref 82–98)
MONOCYTES # BLD AUTO: 0.5 K/UL (ref 0.3–1)
MONOCYTES NFR BLD: 11.6 % (ref 4–15)
NEUTROPHILS # BLD AUTO: 2.5 K/UL (ref 1.8–7.7)
NEUTROPHILS NFR BLD: 58.4 % (ref 38–73)
NRBC BLD-RTO: 0 /100 WBC
PHOSPHATE SERPL-MCNC: 2.6 MG/DL (ref 2.7–4.5)
PLATELET # BLD AUTO: 208 K/UL (ref 150–350)
PMV BLD AUTO: 11 FL (ref 9.2–12.9)
POTASSIUM SERPL-SCNC: 4 MMOL/L (ref 3.5–5.1)
PREALB SERPL-MCNC: 26 MG/DL (ref 20–43)
PROT SERPL-MCNC: 8 G/DL (ref 6–8.4)
PROTHROMBIN TIME: 25.6 SEC (ref 9–12.5)
RBC # BLD AUTO: 5.01 M/UL (ref 4.6–6.2)
SODIUM SERPL-SCNC: 139 MMOL/L (ref 136–145)
T4 FREE SERPL-MCNC: 1.37 NG/DL (ref 0.71–1.51)
TSH SERPL DL<=0.005 MIU/L-ACNC: 6.59 UIU/ML (ref 0.4–4)
WBC # BLD AUTO: 4.31 K/UL (ref 3.9–12.7)

## 2020-12-30 PROCEDURE — 84134 ASSAY OF PREALBUMIN: CPT

## 2020-12-30 PROCEDURE — 82248 BILIRUBIN DIRECT: CPT

## 2020-12-30 PROCEDURE — 84100 ASSAY OF PHOSPHORUS: CPT

## 2020-12-30 PROCEDURE — 93750 OP LVAD INTERROGATION: ICD-10-PCS | Mod: S$GLB,,, | Performed by: INTERNAL MEDICINE

## 2020-12-30 PROCEDURE — 80053 COMPREHEN METABOLIC PANEL: CPT

## 2020-12-30 PROCEDURE — 84443 ASSAY THYROID STIM HORMONE: CPT

## 2020-12-30 PROCEDURE — 93750 INTERROGATION VAD IN PERSON: CPT | Mod: S$GLB,,, | Performed by: INTERNAL MEDICINE

## 2020-12-30 PROCEDURE — 84439 ASSAY OF FREE THYROXINE: CPT

## 2020-12-30 PROCEDURE — 83615 LACTATE (LD) (LDH) ENZYME: CPT

## 2020-12-30 PROCEDURE — 99999 PR PBB SHADOW E&M-EST. PATIENT-LVL III: ICD-10-PCS | Mod: PBBFAC,,, | Performed by: INTERNAL MEDICINE

## 2020-12-30 PROCEDURE — 99999 PR PBB SHADOW E&M-EST. PATIENT-LVL III: CPT | Mod: PBBFAC,,, | Performed by: INTERNAL MEDICINE

## 2020-12-30 PROCEDURE — 85025 COMPLETE CBC W/AUTO DIFF WBC: CPT

## 2020-12-30 PROCEDURE — 83880 ASSAY OF NATRIURETIC PEPTIDE: CPT

## 2020-12-30 PROCEDURE — 99214 PR OFFICE/OUTPT VISIT, EST, LEVL IV, 30-39 MIN: ICD-10-PCS | Mod: S$GLB,,, | Performed by: INTERNAL MEDICINE

## 2020-12-30 PROCEDURE — 86140 C-REACTIVE PROTEIN: CPT

## 2020-12-30 PROCEDURE — 36415 COLL VENOUS BLD VENIPUNCTURE: CPT

## 2020-12-30 PROCEDURE — 99214 OFFICE O/P EST MOD 30 MIN: CPT | Mod: S$GLB,,, | Performed by: INTERNAL MEDICINE

## 2020-12-30 PROCEDURE — 83735 ASSAY OF MAGNESIUM: CPT

## 2020-12-30 PROCEDURE — 85610 PROTHROMBIN TIME: CPT

## 2020-12-30 NOTE — Clinical Note
December 30, 2020        Nader Chiu  [address]  [address]  [place] [address]  Phone: [phone]  Fax: [phone]             [facility]  [address] [address]  Phone: [phone]   Patient: Tim Richards   MR Number: [id number]   YOB: 1965   Date of Visit: 12/30/2020       Dear Dr. Nader Chiu    Thank you for referring Tim Richards to me for evaluation. Attached you will find relevant portions of my assessment and plan of care.    If you have questions, please do not hesitate to call me. I look forward to following Tim Richards along with you.    Sincerely,    Guerda Bautista MD    Enclosure    If you would like to receive this communication electronically, please contact [email or web address] or [phone] to request [employer] access.    [employer] is a tool which provides read-only access to select patient information with whom you have a relationship. Its easy to use and provides real time access to review your patients record including encounter summaries, notes, results, and demographic information.    If you feel you have received this communication in error or would no longer like to receive these types of communications, please e-mail [email or web address]

## 2020-12-30 NOTE — PROGRESS NOTES
"Subjective:   Patient ID:  Tim Richards is a 55 y.o. male who presents for LVAD followup visit.    Implant Date: 3/8/2018    Heartmate II RPM 9800  3/9/18 outflow graft changed     INR goal: 2-3   NO Bridge with heparin  Antiplatelets: stopped on 7/2019 due to GIB.   ASA 81 mg restarted 4/23/2020    TXP SACHI INTERROGATIONS 12/30/2020   Type HeartMate3   Flow 4.5   Speed 9800   PI 3.4   Power (Jackson) 5.8   LSL 9400   Pulsatility No Pulse       HPI   Mr. Richards is a very pleasant 55 yo black male with stage D HFrEF, DCM, HBP, CHF stage D s/p HM 2 who comes for his regular VAD visit. Reports NYHA class III symptoms.  Gets tried and dyspneic with minor activities like washing the dishes. Occasional PND ad orthopnea. VAD speed is at 9800 rpm. No VAD alarms noted on interrogation occasional PI events . BP is 78 mm of Hg. DLES is a "1". INR is therapeutic at 2.5. LDh is at baseline.     Review of Systems   Constitution: Positive for malaise/fatigue and weight gain. Negative for chills, decreased appetite, diaphoresis, fever, night sweats and weight loss.   HENT: Negative for congestion, hearing loss, hoarse voice and sore throat.    Eyes: Negative.  Negative for double vision and pain.   Cardiovascular: Positive for chest pain, dyspnea on exertion, palpitations and paroxysmal nocturnal dyspnea. Negative for claudication, cyanosis, irregular heartbeat, leg swelling, near-syncope, orthopnea and syncope.   Respiratory: Positive for shortness of breath, sleep disturbances due to breathing and snoring. Negative for cough, hemoptysis, sputum production and wheezing.    Endocrine: Positive for cold intolerance, heat intolerance, polydipsia and polyuria.   Hematologic/Lymphatic: Positive for bleeding problem. Bruises/bleeds easily.   Skin: Negative for color change, dry skin and nail changes.   Musculoskeletal: Positive for falls. Negative for neck pain.   Gastrointestinal: Positive for change in bowel habit and constipation. " "Negative for diarrhea, dysphagia, heartburn, hematochezia, jaundice, melena and nausea.   Genitourinary: Negative.  Negative for bladder incontinence, frequency, hematuria, hesitancy and urgency.   Neurological: Positive for dizziness, headaches, light-headedness, numbness, paresthesias and weakness. Negative for seizures.   Psychiatric/Behavioral: Positive for depression and memory loss. The patient is nervous/anxious.        Objective:   Blood pressure (!) 78/0, temperature 98.7 °F (37.1 °C), temperature source Oral, height 6' 1" (1.854 m), weight 127.9 kg (282 lb).body mass index is 37.21 kg/m².    Doppler: 78    Physical Exam   Constitutional: He appears well-developed.   BP (!) 78/0 (BP Location: Right arm, Patient Position: Sitting) Comment (BP Method): doppler  Temp 98.7 °F (37.1 °C) (Oral)   Ht 6' 1" (1.854 m)   Wt 127.9 kg (282 lb)   BMI 37.21 kg/m²      HENT:   Head: Normocephalic.   Neck: No JVD present. Carotid bruit is not present.   Cardiovascular: Regular rhythm and normal heart sounds.   No murmur heard.  Smooth VAD hum. DLES is "1"   Pulmonary/Chest: Effort normal and breath sounds normal. No respiratory distress. He has no wheezes. He has no rales.   Abdominal: Soft. Bowel sounds are normal. He exhibits no distension. There is no abdominal tenderness. There is no rebound.   Musculoskeletal:         General: No edema.   Neurological: He is alert.   Skin: Skin is warm.   Vitals reviewed.      Lab Results   Component Value Date    WBC 4.31 12/30/2020    HGB 13.3 (L) 12/30/2020    HCT 44.2 12/30/2020    MCV 88 12/30/2020     12/30/2020    CO2 21 (L) 12/30/2020    CREATININE 1.8 (H) 12/30/2020    CALCIUM 9.2 12/30/2020    ALBUMIN 4.1 12/30/2020    AST 22 12/30/2020     (H) 12/30/2020    ALT 16 12/30/2020     (H) 12/30/2020       Lab Results   Component Value Date    INR 2.5 (H) 12/30/2020    INR 2.2 (H) 12/18/2020    INR 2.4 (H) 12/14/2020       BNP   Date Value Ref Range Status "   12/30/2020 125 (H) 0 - 99 pg/mL Final     Comment:     Values of less than 100 pg/ml are consistent with non-CHF populations.   10/26/2020 265 (H) 0 - 99 pg/mL Final     Comment:     Values of less than 100 pg/ml are consistent with non-CHF populations.   10/01/2020 248 (H) 0 - 99 pg/mL Final     Comment:     Values of less than 100 pg/ml are consistent with non-CHF populations.       LD   Date Value Ref Range Status   12/30/2020 279 (H) 110 - 260 U/L Final     Comment:     Results are increased in hemolyzed samples.   10/26/2020 318 (H) 110 - 260 U/L Final     Comment:     Results are increased in hemolyzed samples.   09/24/2020 339 (H) 110 - 260 U/L Final     Comment:     Results are increased in hemolyzed samples.       Assessment:      1. LVAD (left ventricular assist device) present    2. Chronic combined systolic and diastolic heart failure    3. Essential hypertension    4. NICM (nonischemic cardiomyopathy)        Plan:   Patient is now NYHA class III. BP is controlled.  INR is therapeutic.  VAD interrogation was performed in clinic  Needs a sleep study to r/o CHICHI  Recommend to get the Flu shot  Any VAD management kits dispensed today medically necessary  Recommend 2 gram sodium restriction and 1500cc fluid restriction.  Encourage physical activity with graded exercise program.  Requested patient to weigh themselves daily, and to notify us if their weight increases by more than 3 lbs in 1 day or 5 lbs in 1 week.   RTC in 2 months     Listed for transplant: No. Implanted as DT.     UNOS Patient Status  Functional Status: 70%- Cares for self: unable to carry on normal activity or active work.   Physical Capacity: No Limitations  Working for Income: No  If no, reason not working: Demands of Treatment    Guerda Bautista MD                 Answers for HPI/ROS submitted by the patient on 12/28/2020   Sweats?: No  chest tightness: Yes  Difficulty breathing when lying down?: No  syncope: No

## 2020-12-30 NOTE — PROGRESS NOTES
"Date of Implant with Heartmate II LVAD: 3/8/18    PATIENT ARRIVED IN CLINIC:  Ambulatory  Accompanied by: self    Vitals  Temperature, oral:   Temp Readings from Last 1 Encounters:   20 98.7 °F (37.1 °C) (Oral)     Blood Pressure:   BP Readings from Last 3 Encounters:   20 (!) 78/0   10/26/20 (!) 90/0   10/01/20 (!) 90/0        VAD Interrogation:  TXP SACHI INTERROGATIONS 2020 10/26/2020 10/1/2020   Type HeartMate3 HeartMate II HeartMate II   Flow 4.5 4.8 5.8   Speed 9800 9800 9800   PI 3.4 3.6 3.7   Power (Jackson) 5.8 6.1 6.7   LSL 9400 9400 -   Pulsatility No Pulse Pulse -       History [id number]  Problems / Issues / Alarms with VAD if any: None noted  VAD Sounds: HM2 Smooth    HCT:   Lab Results   Component Value Date    HCT 44.2 2020    HCT 38 07/10/2020       Complaints/reason for visit today: routine  Emergency Equipment With Patient: yes   VAD Binder With Patient: yes   Reviewed VAD Numbers In Binder: yes  Enrolled in Care Companion: no    Any Equipment Issues: None noted (Refer to  note for complete details)    DLES Assessment:  Appearance Of Driveline: 1  Antibiotics: NO  Velour: no  Manual & Visual Inspection Of Driveline: No kinks or tears noted  Stabilization Device In Use: yes, sun securement device      Patient MyChart Questionnaire:   VAD QUESTIONNAIRE ANSWERS 2020   Have you had any LVAD related issues or alarms? No   Have you had any LVAD Equipment issues? No   How often do you do your LVAD dressing change? Twice per week   What type of dressing change do you do?  Daily "scrubby" kits   Do you need dressing change supplies? No   If yes, what kind (i.e. boxes/kits)? -   Do you need any new LVAD Accessories? (i.e Battery Holster Vest, Holster Vest, RT/LT Consolidation Bag) No   If yes, what kind of accessories do you need? -   Have you been using your Monthly Equipment Checklist? Yes   Description of Stools: Normal and formed without blood "   How Often: Weekly   Color Of Urine: Clear/Yellow   Are you experiencing: Coping OK?   How many hours per night are you sleeping? 6   Do you take any medications to help you fall asleep? Yes   If yes, what medications do you take to help you fall asleep?  Melatonin   Are you Showering? Yes   Do you change your dressing immediately after you dry off?  Yes   Are you exercising? No   If so, what do you do and for how long per day? -   How often are you exercising? -   Are you working or what do you do to stay active? No,   Are you driving? Yes   Do you know that if you have an alarm while driving you need to pull over first before looking at your alarm to avoid an accident? Yes   Are you in pain? Yes   If so, please rate: 3   What hurts? Body, general feeling   Describe pain: Aches and flushed   Do you take any medications for pain?  Yes   If yes, what kind? Ambien, Tylenol   Does this relieve the pain? Yes   How often are you taking pain medicine? Daily   What can we do for you? New Heart   Is your appointment today? No   Do you have your Emergency Bag with you? -   Do you have your VAD Binder with you in clinic today?  -        Labs:    Chemistry        Component Value Date/Time     12/30/2020 1148    K 4.0 12/30/2020 1148     12/30/2020 1148    CO2 21 (L) 12/30/2020 1148    BUN 16 12/30/2020 1148    CREATININE 1.8 (H) 12/30/2020 1148    GLU 95 12/30/2020 1148        Component Value Date/Time    CALCIUM 9.2 12/30/2020 1148    ALKPHOS 136 (H) 12/30/2020 1148    AST 22 12/30/2020 1148    ALT 16 12/30/2020 1148    BILITOT 0.5 12/30/2020 1148    ESTGFRAFRICA 48.3 (A) 12/30/2020 1148    EGFRNONAA 41.7 (A) 12/30/2020 1148            Magnesium   Date Value Ref Range Status   12/30/2020 2.2 1.6 - 2.6 mg/dL Final       Lab Results   Component Value Date    WBC 4.31 12/30/2020    HGB 13.3 (L) 12/30/2020    HCT 44.2 12/30/2020    MCV 88 12/30/2020     12/30/2020       Lab Results   Component Value Date     INR 2.5 (H) 12/30/2020    INR 2.2 (H) 12/18/2020    INR 2.4 (H) 12/14/2020       BNP   Date Value Ref Range Status   12/30/2020 125 (H) 0 - 99 pg/mL Final     Comment:     Values of less than 100 pg/ml are consistent with non-CHF populations.   10/26/2020 265 (H) 0 - 99 pg/mL Final     Comment:     Values of less than 100 pg/ml are consistent with non-CHF populations.   10/01/2020 248 (H) 0 - 99 pg/mL Final     Comment:     Values of less than 100 pg/ml are consistent with non-CHF populations.       LD   Date Value Ref Range Status   12/30/2020 279 (H) 110 - 260 U/L Final     Comment:     Results are increased in hemolyzed samples.   10/26/2020 318 (H) 110 - 260 U/L Final     Comment:     Results are increased in hemolyzed samples.   09/24/2020 339 (H) 110 - 260 U/L Final     Comment:     Results are increased in hemolyzed samples.       Labs reviewed with patient: YES     Patient Satisfaction Survey completed per patient: No  (explained about signature and box to check)  Medication reconciliation: per MA.  Medication Detail updated today: no  Coumadin Managed by: [facility]    Education: Reviewed driveline care, emergency procedures, how to change the controller, alarms with patient, as well as discussed how to page the VAD coordinator in case of an emergency.   Covid - 19 education: Reminded patient/caregiver to check temperature daily and call us if it is > 99.0.  Reminded them  to stay 6 feet away from other people, wash hands frequently, don't touch your face and stay home except to get labs, medications, and appts.      Plans/Needs: Pt to seen in clinic for routine follow up with Dr. Bautista. No needs, concerns voiced at this time. Pt reporting dizziness after sitting and bending over, no dizziness currently. Educated pt on not standing up too fast, encourage pt to call [facility] if gets worse. Labs reviewed, no changes made. Pt to RTC in 2 months with Echo and Lipids.      Hurricane Season: No

## 2020-12-31 ENCOUNTER — PATIENT MESSAGE (OUTPATIENT)
Dept: CARDIOLOGY | Facility: CLINIC | Age: 55
End: 2020-12-31

## 2020-12-31 RX ORDER — LEVOTHYROXINE SODIUM 88 UG/1
88 TABLET ORAL
Qty: 90 TABLET | Refills: 3 | Status: SHIPPED | OUTPATIENT
Start: 2020-12-31 | End: 2021-02-02

## 2021-01-05 ENCOUNTER — PATIENT MESSAGE (OUTPATIENT)
Dept: TRANSPLANT | Facility: CLINIC | Age: 56
End: 2021-01-05

## 2021-01-13 ENCOUNTER — PATIENT MESSAGE (OUTPATIENT)
Dept: CARDIOLOGY | Facility: CLINIC | Age: 56
End: 2021-01-13

## 2021-01-19 ENCOUNTER — PATIENT MESSAGE (OUTPATIENT)
Dept: TRANSPLANT | Facility: CLINIC | Age: 56
End: 2021-01-19

## 2021-01-19 NOTE — PROGRESS NOTES
Spoke with Mrs. Richards and she states the patient is out on disability and he is trying to get insured under [employer] who is taking their time. She states hopefully everything will be resolved this week. She states the patient said he will not until he has coverage.

## 2021-01-21 ENCOUNTER — PATIENT MESSAGE (OUTPATIENT)
Dept: TRANSPLANT | Facility: CLINIC | Age: 56
End: 2021-01-21

## 2021-01-28 ENCOUNTER — TELEPHONE (OUTPATIENT)
Dept: ENDOCRINOLOGY | Facility: CLINIC | Age: 56
End: 2021-01-28

## 2021-01-28 ENCOUNTER — PATIENT MESSAGE (OUTPATIENT)
Dept: ENDOCRINOLOGY | Facility: CLINIC | Age: 56
End: 2021-01-28

## 2021-01-28 DIAGNOSIS — E05.90 HYPERTHYROIDISM: Primary | ICD-10-CM

## 2021-01-29 ENCOUNTER — ANTI-COAG VISIT (OUTPATIENT)
Dept: CARDIOLOGY | Facility: CLINIC | Age: 56
End: 2021-01-29
Payer: COMMERCIAL

## 2021-01-29 ENCOUNTER — LAB VISIT (OUTPATIENT)
Dept: LAB | Facility: HOSPITAL | Age: 56
End: 2021-01-29
Attending: INTERNAL MEDICINE
Payer: COMMERCIAL

## 2021-01-29 ENCOUNTER — PATIENT MESSAGE (OUTPATIENT)
Dept: TRANSPLANT | Facility: CLINIC | Age: 56
End: 2021-01-29

## 2021-01-29 ENCOUNTER — PATIENT MESSAGE (OUTPATIENT)
Dept: ENDOCRINOLOGY | Facility: CLINIC | Age: 56
End: 2021-01-29

## 2021-01-29 DIAGNOSIS — Z95.811 LVAD (LEFT VENTRICULAR ASSIST DEVICE) PRESENT: Primary | ICD-10-CM

## 2021-01-29 DIAGNOSIS — Z79.01 ANTICOAGULATION MONITORING, INR RANGE 2-3: ICD-10-CM

## 2021-01-29 DIAGNOSIS — Z95.811 HEART REPLACED BY HEART ASSIST DEVICE: ICD-10-CM

## 2021-01-29 DIAGNOSIS — I50.9 HEART FAILURE: ICD-10-CM

## 2021-01-29 DIAGNOSIS — Z95.811 LVAD (LEFT VENTRICULAR ASSIST DEVICE) PRESENT: Chronic | ICD-10-CM

## 2021-01-29 LAB
INR PPP: 2.1 (ref 0.8–1.2)
PROTHROMBIN TIME: 21.4 SEC (ref 9–12.5)
T4 FREE SERPL-MCNC: 1.33 NG/DL (ref 0.71–1.51)
TSH SERPL DL<=0.005 MIU/L-ACNC: 5.09 UIU/ML (ref 0.4–4)

## 2021-01-29 PROCEDURE — 36415 COLL VENOUS BLD VENIPUNCTURE: CPT

## 2021-01-29 PROCEDURE — 85610 PROTHROMBIN TIME: CPT

## 2021-01-29 PROCEDURE — 93793 ANTICOAG MGMT PT WARFARIN: CPT | Mod: S$GLB,,,

## 2021-01-29 PROCEDURE — 84443 ASSAY THYROID STIM HORMONE: CPT

## 2021-01-29 PROCEDURE — 84439 ASSAY OF FREE THYROXINE: CPT

## 2021-01-29 PROCEDURE — 93793 PR ANTICOAGULANT MGMT FOR PT TAKING WARFARIN: ICD-10-PCS | Mod: S$GLB,,,

## 2021-02-02 ENCOUNTER — PATIENT MESSAGE (OUTPATIENT)
Dept: ENDOCRINOLOGY | Facility: CLINIC | Age: 56
End: 2021-02-02

## 2021-02-02 DIAGNOSIS — E03.2 HYPOTHYROIDISM DUE TO AMIODARONE: Primary | ICD-10-CM

## 2021-02-02 DIAGNOSIS — T46.2X1A HYPOTHYROIDISM DUE TO AMIODARONE: Primary | ICD-10-CM

## 2021-02-02 RX ORDER — LEVOTHYROXINE SODIUM 100 UG/1
100 TABLET ORAL
Qty: 90 TABLET | Refills: 3 | Status: SHIPPED | OUTPATIENT
Start: 2021-02-02 | End: 2021-11-30

## 2021-02-11 ENCOUNTER — LAB VISIT (OUTPATIENT)
Dept: LAB | Facility: HOSPITAL | Age: 56
End: 2021-02-11
Attending: INTERNAL MEDICINE
Payer: COMMERCIAL

## 2021-02-11 ENCOUNTER — PATIENT MESSAGE (OUTPATIENT)
Dept: TRANSPLANT | Facility: CLINIC | Age: 56
End: 2021-02-11

## 2021-02-11 ENCOUNTER — ANTI-COAG VISIT (OUTPATIENT)
Dept: CARDIOLOGY | Facility: CLINIC | Age: 56
End: 2021-02-11
Payer: COMMERCIAL

## 2021-02-11 DIAGNOSIS — Z79.01 ANTICOAGULATION MONITORING, INR RANGE 2-3: ICD-10-CM

## 2021-02-11 DIAGNOSIS — Z95.811 LVAD (LEFT VENTRICULAR ASSIST DEVICE) PRESENT: Primary | ICD-10-CM

## 2021-02-11 DIAGNOSIS — Z95.811 LVAD (LEFT VENTRICULAR ASSIST DEVICE) PRESENT: ICD-10-CM

## 2021-02-11 LAB
INR PPP: 2.3 (ref 0.8–1.2)
PROTHROMBIN TIME: 23.5 SEC (ref 9–12.5)

## 2021-02-11 PROCEDURE — 93793 PR ANTICOAGULANT MGMT FOR PT TAKING WARFARIN: ICD-10-PCS | Mod: S$GLB,,,

## 2021-02-11 PROCEDURE — 93793 ANTICOAG MGMT PT WARFARIN: CPT | Mod: S$GLB,,,

## 2021-02-11 PROCEDURE — 85610 PROTHROMBIN TIME: CPT

## 2021-02-11 PROCEDURE — 36415 COLL VENOUS BLD VENIPUNCTURE: CPT

## 2021-02-11 RX ORDER — SILDENAFIL 100 MG/1
100 TABLET, FILM COATED ORAL DAILY PRN
Qty: 10 TABLET | Refills: 1 | Status: SHIPPED | OUTPATIENT
Start: 2021-02-11 | End: 2021-04-19 | Stop reason: SDUPTHER

## 2021-02-22 ENCOUNTER — PATIENT MESSAGE (OUTPATIENT)
Dept: CARDIOLOGY | Facility: CLINIC | Age: 56
End: 2021-02-22

## 2021-02-23 ENCOUNTER — HOSPITAL ENCOUNTER (OUTPATIENT)
Facility: HOSPITAL | Age: 56
LOS: 1 days | Discharge: HOME | End: 2021-02-25
Attending: EMERGENCY MEDICINE | Admitting: INTERNAL MEDICINE
Payer: COMMERCIAL

## 2021-02-23 DIAGNOSIS — N18.32 STAGE 3B CHRONIC KIDNEY DISEASE: Chronic | ICD-10-CM

## 2021-02-23 DIAGNOSIS — I47.20 VT (VENTRICULAR TACHYCARDIA): ICD-10-CM

## 2021-02-23 DIAGNOSIS — Z95.811 LVAD (LEFT VENTRICULAR ASSIST DEVICE) PRESENT: ICD-10-CM

## 2021-02-23 DIAGNOSIS — Z91.199 NON COMPLIANCE WITH MEDICAL TREATMENT: ICD-10-CM

## 2021-02-23 DIAGNOSIS — I50.9 HEART FAILURE: ICD-10-CM

## 2021-02-23 DIAGNOSIS — F43.10 POST TRAUMATIC STRESS DISORDER (PTSD): ICD-10-CM

## 2021-02-23 DIAGNOSIS — Z79.01 ANTICOAGULATION MONITORING, INR RANGE 2-3: ICD-10-CM

## 2021-02-23 DIAGNOSIS — T50.2X5A DIURETIC-INDUCED HYPOKALEMIA: ICD-10-CM

## 2021-02-23 DIAGNOSIS — I50.42 CHRONIC COMBINED SYSTOLIC AND DIASTOLIC HEART FAILURE: ICD-10-CM

## 2021-02-23 DIAGNOSIS — I42.0 DILATED CARDIOMYOPATHY: ICD-10-CM

## 2021-02-23 DIAGNOSIS — Z45.02 ICD (IMPLANTABLE CARDIOVERTER-DEFIBRILLATOR) DISCHARGE: ICD-10-CM

## 2021-02-23 DIAGNOSIS — Z45.02 IMPLANTABLE CARDIOVERTER-DEFIBRILLATOR (ICD) DISCHARGE: Primary | ICD-10-CM

## 2021-02-23 DIAGNOSIS — G47.00 INSOMNIA, UNSPECIFIED TYPE: ICD-10-CM

## 2021-02-23 DIAGNOSIS — Z95.811 HISTORY OF LEFT VENTRICULAR ASSIST DEVICE (LVAD): ICD-10-CM

## 2021-02-23 DIAGNOSIS — T46.2X1A HYPOTHYROIDISM DUE TO AMIODARONE: ICD-10-CM

## 2021-02-23 DIAGNOSIS — E03.2 HYPOTHYROIDISM DUE TO AMIODARONE: ICD-10-CM

## 2021-02-23 DIAGNOSIS — I10 ESSENTIAL HYPERTENSION: ICD-10-CM

## 2021-02-23 DIAGNOSIS — M10.239: ICD-10-CM

## 2021-02-23 DIAGNOSIS — N18.31 STAGE 3A CHRONIC KIDNEY DISEASE: ICD-10-CM

## 2021-02-23 DIAGNOSIS — E66.01 SEVERE OBESITY (BMI 35.0-39.9) WITH COMORBIDITY: ICD-10-CM

## 2021-02-23 DIAGNOSIS — E87.6 DIURETIC-INDUCED HYPOKALEMIA: ICD-10-CM

## 2021-02-23 DIAGNOSIS — R07.9 CHEST PAIN: ICD-10-CM

## 2021-02-23 LAB
ALBUMIN SERPL BCP-MCNC: 4.3 G/DL (ref 3.5–5.2)
ALP SERPL-CCNC: 141 U/L (ref 55–135)
ALT SERPL W/O P-5'-P-CCNC: 18 U/L (ref 10–44)
ANION GAP SERPL CALC-SCNC: 11 MMOL/L (ref 8–16)
AST SERPL-CCNC: 26 U/L (ref 10–40)
BASOPHILS # BLD AUTO: 0.02 K/UL (ref 0–0.2)
BASOPHILS NFR BLD: 0.4 % (ref 0–1.9)
BILIRUB SERPL-MCNC: 0.7 MG/DL (ref 0.1–1)
BNP SERPL-MCNC: 209 PG/ML (ref 0–99)
BUN SERPL-MCNC: 15 MG/DL (ref 6–20)
CALCIUM SERPL-MCNC: 9.5 MG/DL (ref 8.7–10.5)
CHLORIDE SERPL-SCNC: 98 MMOL/L (ref 95–110)
CO2 SERPL-SCNC: 28 MMOL/L (ref 23–29)
CREAT SERPL-MCNC: 2.1 MG/DL (ref 0.5–1.4)
CTP QC/QA: YES
DIFFERENTIAL METHOD: ABNORMAL
EOSINOPHIL # BLD AUTO: 0.1 K/UL (ref 0–0.5)
EOSINOPHIL NFR BLD: 1.2 % (ref 0–8)
ERYTHROCYTE [DISTWIDTH] IN BLOOD BY AUTOMATED COUNT: 16.2 % (ref 11.5–14.5)
EST. GFR  (AFRICAN AMERICAN): 40 ML/MIN/1.73 M^2
EST. GFR  (NON AFRICAN AMERICAN): 34.6 ML/MIN/1.73 M^2
GLUCOSE SERPL-MCNC: 111 MG/DL (ref 70–110)
HCT VFR BLD AUTO: 46.1 % (ref 40–54)
HGB BLD-MCNC: 14.4 G/DL (ref 14–18)
IMM GRANULOCYTES # BLD AUTO: 0.01 K/UL (ref 0–0.04)
IMM GRANULOCYTES NFR BLD AUTO: 0.2 % (ref 0–0.5)
INR PPP: 1.9 (ref 0.8–1.2)
LDH SERPL L TO P-CCNC: 294 U/L (ref 110–260)
LYMPHOCYTES # BLD AUTO: 1.1 K/UL (ref 1–4.8)
LYMPHOCYTES NFR BLD: 21.8 % (ref 18–48)
MAGNESIUM SERPL-MCNC: 1.8 MG/DL (ref 1.6–2.6)
MCH RBC QN AUTO: 27.1 PG (ref 27–31)
MCHC RBC AUTO-ENTMCNC: 31.2 G/DL (ref 32–36)
MCV RBC AUTO: 87 FL (ref 82–98)
MONOCYTES # BLD AUTO: 0.6 K/UL (ref 0.3–1)
MONOCYTES NFR BLD: 11.2 % (ref 4–15)
NEUTROPHILS # BLD AUTO: 3.2 K/UL (ref 1.8–7.7)
NEUTROPHILS NFR BLD: 65.2 % (ref 38–73)
NRBC BLD-RTO: 0 /100 WBC
PLATELET # BLD AUTO: 185 K/UL (ref 150–350)
PMV BLD AUTO: 10.2 FL (ref 9.2–12.9)
POTASSIUM SERPL-SCNC: 3.1 MMOL/L (ref 3.5–5.1)
PROT SERPL-MCNC: 8.4 G/DL (ref 6–8.4)
PROTHROMBIN TIME: 19.9 SEC (ref 9–12.5)
RBC # BLD AUTO: 5.31 M/UL (ref 4.6–6.2)
SARS-COV-2 RDRP RESP QL NAA+PROBE: NEGATIVE
SODIUM SERPL-SCNC: 137 MMOL/L (ref 136–145)
TROPONIN I SERPL DL<=0.01 NG/ML-MCNC: 0.06 NG/ML (ref 0–0.03)
WBC # BLD AUTO: 4.9 K/UL (ref 3.9–12.7)

## 2021-02-23 PROCEDURE — 85610 PROTHROMBIN TIME: CPT

## 2021-02-23 PROCEDURE — 80053 COMPREHEN METABOLIC PANEL: CPT

## 2021-02-23 PROCEDURE — 86703 HIV-1/HIV-2 1 RESULT ANTBDY: CPT

## 2021-02-23 PROCEDURE — 63600175 PHARM REV CODE 636 W HCPCS: Performed by: STUDENT IN AN ORGANIZED HEALTH CARE EDUCATION/TRAINING PROGRAM

## 2021-02-23 PROCEDURE — 99291 CRITICAL CARE FIRST HOUR: CPT | Mod: ,,, | Performed by: PHYSICIAN ASSISTANT

## 2021-02-23 PROCEDURE — 25000003 PHARM REV CODE 250: Performed by: STUDENT IN AN ORGANIZED HEALTH CARE EDUCATION/TRAINING PROGRAM

## 2021-02-23 PROCEDURE — 86803 HEPATITIS C AB TEST: CPT

## 2021-02-23 PROCEDURE — 99291 CRITICAL CARE FIRST HOUR: CPT | Mod: 25

## 2021-02-23 PROCEDURE — 84484 ASSAY OF TROPONIN QUANT: CPT

## 2021-02-23 PROCEDURE — 85025 COMPLETE CBC W/AUTO DIFF WBC: CPT

## 2021-02-23 PROCEDURE — 83735 ASSAY OF MAGNESIUM: CPT

## 2021-02-23 PROCEDURE — 27000248 HC VAD-ADDITIONAL DAY

## 2021-02-23 PROCEDURE — 93010 EKG 12-LEAD: ICD-10-PCS | Mod: ,,, | Performed by: INTERNAL MEDICINE

## 2021-02-23 PROCEDURE — 96374 THER/PROPH/DIAG INJ IV PUSH: CPT

## 2021-02-23 PROCEDURE — 93005 ELECTROCARDIOGRAM TRACING: CPT

## 2021-02-23 PROCEDURE — 93010 ELECTROCARDIOGRAM REPORT: CPT | Mod: ,,, | Performed by: INTERNAL MEDICINE

## 2021-02-23 PROCEDURE — 83615 LACTATE (LD) (LDH) ENZYME: CPT

## 2021-02-23 PROCEDURE — G0378 HOSPITAL OBSERVATION PER HR: HCPCS

## 2021-02-23 PROCEDURE — 99291 PR CRITICAL CARE, E/M 30-74 MINUTES: ICD-10-PCS | Mod: ,,, | Performed by: PHYSICIAN ASSISTANT

## 2021-02-23 PROCEDURE — U0002 COVID-19 LAB TEST NON-CDC: HCPCS | Performed by: EMERGENCY MEDICINE

## 2021-02-23 PROCEDURE — 83880 ASSAY OF NATRIURETIC PEPTIDE: CPT

## 2021-02-23 RX ORDER — GUANFACINE 1 MG/1
2 TABLET ORAL NIGHTLY
Status: DISCONTINUED | OUTPATIENT
Start: 2021-02-23 | End: 2021-02-25 | Stop reason: HOSPADM

## 2021-02-23 RX ORDER — WARFARIN SODIUM 5 MG/1
5 TABLET ORAL
Status: DISCONTINUED | OUTPATIENT
Start: 2021-02-24 | End: 2021-02-24

## 2021-02-23 RX ORDER — WARFARIN 7.5 MG/1
7.5 TABLET ORAL
Status: DISCONTINUED | OUTPATIENT
Start: 2021-02-28 | End: 2021-02-24

## 2021-02-23 RX ORDER — ACETAMINOPHEN 325 MG/1
650 TABLET ORAL EVERY 6 HOURS PRN
Status: DISCONTINUED | OUTPATIENT
Start: 2021-02-23 | End: 2021-02-25 | Stop reason: HOSPADM

## 2021-02-23 RX ORDER — POLYETHYLENE GLYCOL 3350 17 G/17G
17 POWDER, FOR SOLUTION ORAL DAILY
Status: DISCONTINUED | OUTPATIENT
Start: 2021-02-24 | End: 2021-02-25 | Stop reason: HOSPADM

## 2021-02-23 RX ORDER — PAROXETINE 10 MG/1
10 TABLET, FILM COATED ORAL DAILY
Status: DISCONTINUED | OUTPATIENT
Start: 2021-02-24 | End: 2021-02-25 | Stop reason: HOSPADM

## 2021-02-23 RX ORDER — POTASSIUM CHLORIDE 20 MEQ/1
40 TABLET, EXTENDED RELEASE ORAL EVERY 4 HOURS
Status: COMPLETED | OUTPATIENT
Start: 2021-02-23 | End: 2021-02-24

## 2021-02-23 RX ORDER — DOXAZOSIN 8 MG/1
8 TABLET ORAL EVERY 12 HOURS
Status: DISCONTINUED | OUTPATIENT
Start: 2021-02-23 | End: 2021-02-25 | Stop reason: HOSPADM

## 2021-02-23 RX ORDER — ASPIRIN 325 MG
325 TABLET ORAL
Status: DISCONTINUED | OUTPATIENT
Start: 2021-02-23 | End: 2021-02-23

## 2021-02-23 RX ORDER — WARFARIN SODIUM 5 MG/1
5 TABLET ORAL
Status: DISCONTINUED | OUTPATIENT
Start: 2021-02-23 | End: 2021-02-23

## 2021-02-23 RX ORDER — SENNOSIDES 8.6 MG/1
8.6 TABLET ORAL DAILY PRN
Status: DISCONTINUED | OUTPATIENT
Start: 2021-02-23 | End: 2021-02-25 | Stop reason: HOSPADM

## 2021-02-23 RX ORDER — ZOLPIDEM TARTRATE 5 MG/1
10 TABLET ORAL NIGHTLY PRN
Status: DISCONTINUED | OUTPATIENT
Start: 2021-02-23 | End: 2021-02-25 | Stop reason: HOSPADM

## 2021-02-23 RX ORDER — TORSEMIDE 20 MG/1
40 TABLET ORAL DAILY
Status: DISCONTINUED | OUTPATIENT
Start: 2021-02-24 | End: 2021-02-25 | Stop reason: HOSPADM

## 2021-02-23 RX ORDER — WARFARIN SODIUM 5 MG/1
5 TABLET ORAL
Status: DISCONTINUED | OUTPATIENT
Start: 2021-02-27 | End: 2021-02-24

## 2021-02-23 RX ORDER — ALLOPURINOL 100 MG/1
100 TABLET ORAL NIGHTLY
Status: DISCONTINUED | OUTPATIENT
Start: 2021-02-23 | End: 2021-02-25 | Stop reason: HOSPADM

## 2021-02-23 RX ORDER — AMIODARONE HYDROCHLORIDE 200 MG/1
200 TABLET ORAL DAILY
Status: DISCONTINUED | OUTPATIENT
Start: 2021-02-23 | End: 2021-02-24

## 2021-02-23 RX ORDER — WARFARIN SODIUM 5 MG/1
5 TABLET ORAL
Status: DISCONTINUED | OUTPATIENT
Start: 2021-02-25 | End: 2021-02-24

## 2021-02-23 RX ORDER — OXYCODONE AND ACETAMINOPHEN 5; 325 MG/1; MG/1
1 TABLET ORAL EVERY 4 HOURS PRN
Status: DISCONTINUED | OUTPATIENT
Start: 2021-02-23 | End: 2021-02-25 | Stop reason: HOSPADM

## 2021-02-23 RX ORDER — MAGNESIUM SULFATE HEPTAHYDRATE 40 MG/ML
2 INJECTION, SOLUTION INTRAVENOUS ONCE
Status: COMPLETED | OUTPATIENT
Start: 2021-02-23 | End: 2021-02-24

## 2021-02-23 RX ORDER — WARFARIN 7.5 MG/1
7.5 TABLET ORAL
Status: DISCONTINUED | OUTPATIENT
Start: 2021-02-25 | End: 2021-02-23

## 2021-02-23 RX ORDER — LEVOTHYROXINE SODIUM 100 UG/1
100 TABLET ORAL
Status: DISCONTINUED | OUTPATIENT
Start: 2021-02-24 | End: 2021-02-25 | Stop reason: HOSPADM

## 2021-02-23 RX ORDER — FERROUS GLUCONATE 324(37.5)
324 TABLET ORAL
Status: DISCONTINUED | OUTPATIENT
Start: 2021-02-24 | End: 2021-02-25 | Stop reason: HOSPADM

## 2021-02-23 RX ORDER — WARFARIN 7.5 MG/1
7.5 TABLET ORAL
Status: DISCONTINUED | OUTPATIENT
Start: 2021-03-02 | End: 2021-02-24

## 2021-02-23 RX ORDER — POTASSIUM CHLORIDE 20 MEQ/1
20 TABLET, EXTENDED RELEASE ORAL 2 TIMES DAILY
Status: DISCONTINUED | OUTPATIENT
Start: 2021-02-23 | End: 2021-02-25 | Stop reason: HOSPADM

## 2021-02-23 RX ORDER — AMLODIPINE BESYLATE 10 MG/1
10 TABLET ORAL DAILY
Status: DISCONTINUED | OUTPATIENT
Start: 2021-02-24 | End: 2021-02-24

## 2021-02-23 RX ORDER — PANTOPRAZOLE SODIUM 40 MG/1
40 TABLET, DELAYED RELEASE ORAL
Status: DISCONTINUED | OUTPATIENT
Start: 2021-02-24 | End: 2021-02-25 | Stop reason: HOSPADM

## 2021-02-23 RX ORDER — WARFARIN 2.5 MG/1
2.5 TABLET ORAL ONCE
Status: COMPLETED | OUTPATIENT
Start: 2021-02-24 | End: 2021-02-24

## 2021-02-23 RX ADMIN — GUANFACINE HYDROCHLORIDE 2 MG: 1 TABLET ORAL at 11:02

## 2021-02-23 RX ADMIN — POTASSIUM CHLORIDE 20 MEQ: 1500 TABLET, EXTENDED RELEASE ORAL at 11:02

## 2021-02-23 RX ADMIN — AMIODARONE HYDROCHLORIDE 200 MG: 200 TABLET ORAL at 11:02

## 2021-02-23 RX ADMIN — DOXAZOSIN 8 MG: 8 TABLET ORAL at 11:02

## 2021-02-23 RX ADMIN — ALLOPURINOL 100 MG: 100 TABLET ORAL at 11:02

## 2021-02-23 RX ADMIN — WARFARIN SODIUM 5 MG: 5 TABLET ORAL at 11:02

## 2021-02-23 RX ADMIN — MAGNESIUM SULFATE 2 G: 2 INJECTION INTRAVENOUS at 11:02

## 2021-02-23 RX ADMIN — POTASSIUM CHLORIDE 40 MEQ: 1500 TABLET, EXTENDED RELEASE ORAL at 10:02

## 2021-02-24 ENCOUNTER — PATIENT MESSAGE (OUTPATIENT)
Dept: TRANSPLANT | Facility: CLINIC | Age: 56
End: 2021-02-24

## 2021-02-24 PROBLEM — E03.2 HYPOTHYROIDISM DUE TO AMIODARONE: Status: ACTIVE | Noted: 2019-11-08

## 2021-02-24 PROBLEM — T46.2X1A HYPOTHYROIDISM DUE TO AMIODARONE: Status: ACTIVE | Noted: 2019-11-08

## 2021-02-24 LAB
ALBUMIN SERPL BCP-MCNC: 3.6 G/DL (ref 3.5–5.2)
ALP SERPL-CCNC: 122 U/L (ref 55–135)
ALT SERPL W/O P-5'-P-CCNC: 15 U/L (ref 10–44)
ANION GAP SERPL CALC-SCNC: 10 MMOL/L (ref 8–16)
ANION GAP SERPL CALC-SCNC: 10 MMOL/L (ref 8–16)
ANION GAP SERPL CALC-SCNC: 9 MMOL/L (ref 8–16)
APTT BLDCRRT: 38.5 SEC (ref 21–32)
ASCENDING AORTA: 3.78 CM
AST SERPL-CCNC: 20 U/L (ref 10–40)
BASOPHILS # BLD AUTO: 0.02 K/UL (ref 0–0.2)
BASOPHILS NFR BLD: 0.4 % (ref 0–1.9)
BILIRUB DIRECT SERPL-MCNC: 0.2 MG/DL (ref 0.1–0.3)
BILIRUB SERPL-MCNC: 0.6 MG/DL (ref 0.1–1)
BNP SERPL-MCNC: 164 PG/ML (ref 0–99)
BSA FOR ECHO PROCEDURE: 2.57 M2
BUN SERPL-MCNC: 14 MG/DL (ref 6–20)
BUN SERPL-MCNC: 15 MG/DL (ref 6–20)
BUN SERPL-MCNC: 16 MG/DL (ref 6–20)
CALCIUM SERPL-MCNC: 8.6 MG/DL (ref 8.7–10.5)
CHLORIDE SERPL-SCNC: 101 MMOL/L (ref 95–110)
CHLORIDE SERPL-SCNC: 103 MMOL/L (ref 95–110)
CHLORIDE SERPL-SCNC: 104 MMOL/L (ref 95–110)
CO2 SERPL-SCNC: 25 MMOL/L (ref 23–29)
CO2 SERPL-SCNC: 25 MMOL/L (ref 23–29)
CO2 SERPL-SCNC: 27 MMOL/L (ref 23–29)
CREAT SERPL-MCNC: 1.7 MG/DL (ref 0.5–1.4)
CREAT SERPL-MCNC: 1.8 MG/DL (ref 0.5–1.4)
CREAT SERPL-MCNC: 1.9 MG/DL (ref 0.5–1.4)
CRP SERPL-MCNC: 10.2 MG/L (ref 0–8.2)
CV ECHO LV RWT: 0.23 CM
DIFFERENTIAL METHOD: ABNORMAL
DOP CALC LVOT AREA: 4.5 CM2
DOP CALC LVOT DIAMETER: 2.39 CM
E WAVE DECELERATION TIME: 107.99 MSEC
E/A RATIO: 0.82
E/E' RATIO: 11.75 M/S
ECHO LV POSTERIOR WALL: 1.13 CM (ref 0.6–1.1)
EOSINOPHIL # BLD AUTO: 0.1 K/UL (ref 0–0.5)
EOSINOPHIL NFR BLD: 1.7 % (ref 0–8)
ERYTHROCYTE [DISTWIDTH] IN BLOOD BY AUTOMATED COUNT: 16.3 % (ref 11.5–14.5)
EST. GFR  (AFRICAN AMERICAN): 45.2 ML/MIN/1.73 M^2
EST. GFR  (AFRICAN AMERICAN): 48.3 ML/MIN/1.73 M^2
EST. GFR  (AFRICAN AMERICAN): 51.7 ML/MIN/1.73 M^2
EST. GFR  (NON AFRICAN AMERICAN): 39.1 ML/MIN/1.73 M^2
EST. GFR  (NON AFRICAN AMERICAN): 41.7 ML/MIN/1.73 M^2
EST. GFR  (NON AFRICAN AMERICAN): 44.7 ML/MIN/1.73 M^2
FRACTIONAL SHORTENING: 4 % (ref 28–44)
GLUCOSE SERPL-MCNC: 111 MG/DL (ref 70–110)
GLUCOSE SERPL-MCNC: 115 MG/DL (ref 70–110)
GLUCOSE SERPL-MCNC: 154 MG/DL (ref 70–110)
HCT VFR BLD AUTO: 40.8 % (ref 40–54)
HCV AB SERPL QL IA: NEGATIVE
HGB BLD-MCNC: 12.7 G/DL (ref 14–18)
HIV 1+2 AB+HIV1 P24 AG SERPL QL IA: NEGATIVE
IMM GRANULOCYTES # BLD AUTO: 0.01 K/UL (ref 0–0.04)
IMM GRANULOCYTES NFR BLD AUTO: 0.2 % (ref 0–0.5)
INR PPP: 1.8 (ref 0.8–1.2)
INTERVENTRICULAR SEPTUM: 1.08 CM (ref 0.6–1.1)
LA MAJOR: 6.86 CM
LA MINOR: 6.87 CM
LA WIDTH: 5.94 CM
LDH SERPL L TO P-CCNC: 241 U/L (ref 110–260)
LEFT ATRIUM SIZE: 5.09 CM
LEFT ATRIUM VOLUME INDEX MOD: 55.3 ML/M2
LEFT ATRIUM VOLUME INDEX: 70.9 ML/M2
LEFT ATRIUM VOLUME MOD: 137.69 CM3
LEFT ATRIUM VOLUME: 176.43 CM3
LEFT INTERNAL DIMENSION IN SYSTOLE: 9.32 CM (ref 2.1–4)
LEFT VENTRICLE DIASTOLIC VOLUME INDEX: 213.08 ML/M2
LEFT VENTRICLE DIASTOLIC VOLUME: 530.58 ML
LEFT VENTRICLE MASS INDEX: 261 G/M2
LEFT VENTRICLE SYSTOLIC VOLUME INDEX: 194 ML/M2
LEFT VENTRICLE SYSTOLIC VOLUME: 483.11 ML
LEFT VENTRICULAR INTERNAL DIMENSION IN DIASTOLE: 9.72 CM (ref 3.5–6)
LEFT VENTRICULAR MASS: 649.23 G
LV LATERAL E/E' RATIO: 15.67 M/S
LV SEPTAL E/E' RATIO: 9.4 M/S
LYMPHOCYTES # BLD AUTO: 0.9 K/UL (ref 1–4.8)
LYMPHOCYTES NFR BLD: 19 % (ref 18–48)
MAGNESIUM SERPL-MCNC: 2 MG/DL (ref 1.6–2.6)
MAGNESIUM SERPL-MCNC: 2 MG/DL (ref 1.6–2.6)
MAGNESIUM SERPL-MCNC: 2.2 MG/DL (ref 1.6–2.6)
MCH RBC QN AUTO: 27.3 PG (ref 27–31)
MCHC RBC AUTO-ENTMCNC: 31.1 G/DL (ref 32–36)
MCV RBC AUTO: 88 FL (ref 82–98)
MONOCYTES # BLD AUTO: 0.7 K/UL (ref 0.3–1)
MONOCYTES NFR BLD: 15.4 % (ref 4–15)
MV PEAK A VEL: 0.57 M/S
MV PEAK E VEL: 0.47 M/S
MV STENOSIS PRESSURE HALF TIME: 31.32 MS
MV VALVE AREA P 1/2 METHOD: 7.02 CM2
NEUTROPHILS # BLD AUTO: 3 K/UL (ref 1.8–7.7)
NEUTROPHILS NFR BLD: 63.3 % (ref 38–73)
NRBC BLD-RTO: 0 /100 WBC
PHOSPHATE SERPL-MCNC: 2.8 MG/DL (ref 2.7–4.5)
PISA TR MAX VEL: 1.6 M/S
PLATELET # BLD AUTO: 174 K/UL (ref 150–350)
PMV BLD AUTO: 11.2 FL (ref 9.2–12.9)
POTASSIUM SERPL-SCNC: 3.4 MMOL/L (ref 3.5–5.1)
POTASSIUM SERPL-SCNC: 3.8 MMOL/L (ref 3.5–5.1)
POTASSIUM SERPL-SCNC: 3.8 MMOL/L (ref 3.5–5.1)
PREALB SERPL-MCNC: 23 MG/DL (ref 20–43)
PROT SERPL-MCNC: 7 G/DL (ref 6–8.4)
PROTHROMBIN TIME: 18.8 SEC (ref 9–12.5)
RA MAJOR: 5.31 CM
RA WIDTH: 4.14 CM
RBC # BLD AUTO: 4.65 M/UL (ref 4.6–6.2)
RIGHT VENTRICULAR END-DIASTOLIC DIMENSION: 3.59 CM
RV TISSUE DOPPLER FREE WALL SYSTOLIC VELOCITY 1 (APICAL 4 CHAMBER VIEW): 6.08 CM/S
SINUS: 3.85 CM
SODIUM SERPL-SCNC: 137 MMOL/L (ref 136–145)
SODIUM SERPL-SCNC: 138 MMOL/L (ref 136–145)
SODIUM SERPL-SCNC: 139 MMOL/L (ref 136–145)
STJ: 3.33 CM
T4 FREE SERPL-MCNC: 1.21 NG/DL (ref 0.71–1.51)
TDI LATERAL: 0.03 M/S
TDI SEPTAL: 0.05 M/S
TDI: 0.04 M/S
TR MAX PG: 10 MMHG
TRICUSPID ANNULAR PLANE SYSTOLIC EXCURSION: 2.06 CM
TSH SERPL DL<=0.005 MIU/L-ACNC: 4.41 UIU/ML (ref 0.4–4)
WBC # BLD AUTO: 4.73 K/UL (ref 3.9–12.7)

## 2021-02-24 PROCEDURE — 99220 PR INITIAL OBSERVATION CARE,LEVL III: ICD-10-PCS | Mod: ,,, | Performed by: INTERNAL MEDICINE

## 2021-02-24 PROCEDURE — 85025 COMPLETE CBC W/AUTO DIFF WBC: CPT

## 2021-02-24 PROCEDURE — 80048 BASIC METABOLIC PNL TOTAL CA: CPT | Mod: 91

## 2021-02-24 PROCEDURE — 25000003 PHARM REV CODE 250: Performed by: STUDENT IN AN ORGANIZED HEALTH CARE EDUCATION/TRAINING PROGRAM

## 2021-02-24 PROCEDURE — 86140 C-REACTIVE PROTEIN: CPT

## 2021-02-24 PROCEDURE — 84439 ASSAY OF FREE THYROXINE: CPT

## 2021-02-24 PROCEDURE — 85610 PROTHROMBIN TIME: CPT

## 2021-02-24 PROCEDURE — 99225 PR SUBSEQUENT OBSERVATION CARE,LEVEL II: CPT | Mod: ,,, | Performed by: INTERNAL MEDICINE

## 2021-02-24 PROCEDURE — 25000003 PHARM REV CODE 250: Performed by: INTERNAL MEDICINE

## 2021-02-24 PROCEDURE — 84443 ASSAY THYROID STIM HORMONE: CPT

## 2021-02-24 PROCEDURE — 99214 OFFICE O/P EST MOD 30 MIN: CPT | Mod: ,,, | Performed by: INTERNAL MEDICINE

## 2021-02-24 PROCEDURE — 27000248 HC VAD-ADDITIONAL DAY

## 2021-02-24 PROCEDURE — 99214 PR OFFICE/OUTPT VISIT, EST, LEVL IV, 30-39 MIN: ICD-10-PCS | Mod: ,,, | Performed by: INTERNAL MEDICINE

## 2021-02-24 PROCEDURE — 83735 ASSAY OF MAGNESIUM: CPT

## 2021-02-24 PROCEDURE — 93750 PR INTERROGATE VENT ASSIST DEV, IN PERSON, W PHYSICIAN ANALYSIS: ICD-10-PCS | Mod: ,,, | Performed by: INTERNAL MEDICINE

## 2021-02-24 PROCEDURE — 85730 THROMBOPLASTIN TIME PARTIAL: CPT

## 2021-02-24 PROCEDURE — G0378 HOSPITAL OBSERVATION PER HR: HCPCS

## 2021-02-24 PROCEDURE — 80076 HEPATIC FUNCTION PANEL: CPT

## 2021-02-24 PROCEDURE — 93750 INTERROGATION VAD IN PERSON: CPT | Mod: ,,, | Performed by: INTERNAL MEDICINE

## 2021-02-24 PROCEDURE — 99220 PR INITIAL OBSERVATION CARE,LEVL III: CPT | Mod: ,,, | Performed by: INTERNAL MEDICINE

## 2021-02-24 PROCEDURE — 83615 LACTATE (LD) (LDH) ENZYME: CPT

## 2021-02-24 PROCEDURE — 36415 COLL VENOUS BLD VENIPUNCTURE: CPT

## 2021-02-24 PROCEDURE — 25000003 PHARM REV CODE 250: Performed by: PHYSICIAN ASSISTANT

## 2021-02-24 PROCEDURE — 84134 ASSAY OF PREALBUMIN: CPT

## 2021-02-24 PROCEDURE — 99225 PR SUBSEQUENT OBSERVATION CARE,LEVEL II: ICD-10-PCS | Mod: ,,, | Performed by: INTERNAL MEDICINE

## 2021-02-24 PROCEDURE — 84100 ASSAY OF PHOSPHORUS: CPT

## 2021-02-24 PROCEDURE — 83880 ASSAY OF NATRIURETIC PEPTIDE: CPT

## 2021-02-24 RX ORDER — WARFARIN SODIUM 5 MG/1
5 TABLET ORAL
Status: DISCONTINUED | OUTPATIENT
Start: 2021-02-25 | End: 2021-02-25 | Stop reason: HOSPADM

## 2021-02-24 RX ORDER — POTASSIUM CHLORIDE 20 MEQ/1
40 TABLET, EXTENDED RELEASE ORAL ONCE
Status: COMPLETED | OUTPATIENT
Start: 2021-02-24 | End: 2021-02-24

## 2021-02-24 RX ORDER — AMIODARONE HYDROCHLORIDE 200 MG/1
200 TABLET ORAL ONCE
Status: COMPLETED | OUTPATIENT
Start: 2021-02-24 | End: 2021-02-24

## 2021-02-24 RX ORDER — AMLODIPINE BESYLATE 10 MG/1
10 TABLET ORAL DAILY
Status: DISCONTINUED | OUTPATIENT
Start: 2021-02-24 | End: 2021-02-25 | Stop reason: HOSPADM

## 2021-02-24 RX ORDER — WARFARIN 7.5 MG/1
7.5 TABLET ORAL
Status: DISCONTINUED | OUTPATIENT
Start: 2021-02-24 | End: 2021-02-25 | Stop reason: HOSPADM

## 2021-02-24 RX ORDER — AMIODARONE HYDROCHLORIDE 200 MG/1
400 TABLET ORAL DAILY
Status: DISCONTINUED | OUTPATIENT
Start: 2021-02-25 | End: 2021-02-25 | Stop reason: HOSPADM

## 2021-02-24 RX ADMIN — FERROUS GLUCONATE TAB 324 MG (37.5 MG ELEMENTAL IRON) 324 MG: 324 (37.5 FE) TAB at 12:02

## 2021-02-24 RX ADMIN — WARFARIN SODIUM 2.5 MG: 2.5 TABLET ORAL at 01:02

## 2021-02-24 RX ADMIN — OXYCODONE HYDROCHLORIDE AND ACETAMINOPHEN 1 TABLET: 5; 325 TABLET ORAL at 12:02

## 2021-02-24 RX ADMIN — POTASSIUM CHLORIDE 40 MEQ: 1500 TABLET, EXTENDED RELEASE ORAL at 01:02

## 2021-02-24 RX ADMIN — DOXAZOSIN 8 MG: 8 TABLET ORAL at 06:02

## 2021-02-24 RX ADMIN — POTASSIUM CHLORIDE 20 MEQ: 1500 TABLET, EXTENDED RELEASE ORAL at 08:02

## 2021-02-24 RX ADMIN — TORSEMIDE 40 MG: 20 TABLET ORAL at 10:02

## 2021-02-24 RX ADMIN — POLYETHYLENE GLYCOL 3350 17 G: 17 POWDER, FOR SOLUTION ORAL at 10:02

## 2021-02-24 RX ADMIN — PAROXETINE HYDROCHLORIDE 10 MG: 10 TABLET, FILM COATED ORAL at 10:02

## 2021-02-24 RX ADMIN — ALLOPURINOL 100 MG: 100 TABLET ORAL at 08:02

## 2021-02-24 RX ADMIN — DOXAZOSIN 8 MG: 8 TABLET ORAL at 08:02

## 2021-02-24 RX ADMIN — POTASSIUM CHLORIDE 20 MEQ: 1500 TABLET, EXTENDED RELEASE ORAL at 12:02

## 2021-02-24 RX ADMIN — AMIODARONE HYDROCHLORIDE 200 MG: 200 TABLET ORAL at 10:02

## 2021-02-24 RX ADMIN — AMIODARONE HYDROCHLORIDE 200 MG: 200 TABLET ORAL at 12:02

## 2021-02-24 RX ADMIN — ZOLPIDEM TARTRATE 10 MG: 5 TABLET ORAL at 01:02

## 2021-02-24 RX ADMIN — GUANFACINE HYDROCHLORIDE 2 MG: 1 TABLET ORAL at 08:02

## 2021-02-24 RX ADMIN — WARFARIN SODIUM 7.5 MG: 7.5 TABLET ORAL at 05:02

## 2021-02-24 RX ADMIN — POTASSIUM CHLORIDE 40 MEQ: 1500 TABLET, EXTENDED RELEASE ORAL at 10:02

## 2021-02-24 RX ADMIN — AMLODIPINE BESYLATE 10 MG: 10 TABLET ORAL at 06:02

## 2021-02-24 RX ADMIN — ZOLPIDEM TARTRATE 10 MG: 5 TABLET ORAL at 08:02

## 2021-02-24 RX ADMIN — LEVOTHYROXINE SODIUM 100 MCG: 100 TABLET ORAL at 06:02

## 2021-02-24 RX ADMIN — PANTOPRAZOLE SODIUM 40 MG: 40 TABLET, DELAYED RELEASE ORAL at 12:02

## 2021-02-25 ENCOUNTER — PATIENT MESSAGE (OUTPATIENT)
Dept: TRANSPLANT | Facility: CLINIC | Age: 56
End: 2021-02-25

## 2021-02-25 VITALS
HEIGHT: 73 IN | SYSTOLIC BLOOD PRESSURE: 82 MMHG | HEART RATE: 65 BPM | BODY MASS INDEX: 37.75 KG/M2 | OXYGEN SATURATION: 95 % | RESPIRATION RATE: 17 BRPM | TEMPERATURE: 98 F | WEIGHT: 284.81 LBS

## 2021-02-25 LAB
ANION GAP SERPL CALC-SCNC: 9 MMOL/L (ref 8–16)
APTT BLDCRRT: 40.6 SEC (ref 21–32)
BASOPHILS # BLD AUTO: 0.02 K/UL (ref 0–0.2)
BASOPHILS NFR BLD: 0.4 % (ref 0–1.9)
BUN SERPL-MCNC: 16 MG/DL (ref 6–20)
CALCIUM SERPL-MCNC: 8.7 MG/DL (ref 8.7–10.5)
CHLORIDE SERPL-SCNC: 102 MMOL/L (ref 95–110)
CO2 SERPL-SCNC: 27 MMOL/L (ref 23–29)
CREAT SERPL-MCNC: 1.8 MG/DL (ref 0.5–1.4)
DIFFERENTIAL METHOD: ABNORMAL
EOSINOPHIL # BLD AUTO: 0.1 K/UL (ref 0–0.5)
EOSINOPHIL NFR BLD: 1.3 % (ref 0–8)
ERYTHROCYTE [DISTWIDTH] IN BLOOD BY AUTOMATED COUNT: 16.3 % (ref 11.5–14.5)
EST. GFR  (AFRICAN AMERICAN): 48.3 ML/MIN/1.73 M^2
EST. GFR  (NON AFRICAN AMERICAN): 41.7 ML/MIN/1.73 M^2
GLUCOSE SERPL-MCNC: 86 MG/DL (ref 70–110)
HCT VFR BLD AUTO: 42.5 % (ref 40–54)
HGB BLD-MCNC: 13.2 G/DL (ref 14–18)
IMM GRANULOCYTES # BLD AUTO: 0.02 K/UL (ref 0–0.04)
IMM GRANULOCYTES NFR BLD AUTO: 0.4 % (ref 0–0.5)
INR PPP: 2 (ref 0.8–1.2)
LDH SERPL L TO P-CCNC: 271 U/L (ref 110–260)
LYMPHOCYTES # BLD AUTO: 1.1 K/UL (ref 1–4.8)
LYMPHOCYTES NFR BLD: 24.3 % (ref 18–48)
MAGNESIUM SERPL-MCNC: 2.1 MG/DL (ref 1.6–2.6)
MCH RBC QN AUTO: 27.4 PG (ref 27–31)
MCHC RBC AUTO-ENTMCNC: 31.1 G/DL (ref 32–36)
MCV RBC AUTO: 88 FL (ref 82–98)
MONOCYTES # BLD AUTO: 0.5 K/UL (ref 0.3–1)
MONOCYTES NFR BLD: 11.8 % (ref 4–15)
NEUTROPHILS # BLD AUTO: 2.8 K/UL (ref 1.8–7.7)
NEUTROPHILS NFR BLD: 61.8 % (ref 38–73)
NRBC BLD-RTO: 0 /100 WBC
PHOSPHATE SERPL-MCNC: 3 MG/DL (ref 2.7–4.5)
PLATELET # BLD AUTO: 188 K/UL (ref 150–350)
PMV BLD AUTO: 11.4 FL (ref 9.2–12.9)
POTASSIUM SERPL-SCNC: 3.9 MMOL/L (ref 3.5–5.1)
PROTHROMBIN TIME: 21.1 SEC (ref 9–12.5)
RBC # BLD AUTO: 4.82 M/UL (ref 4.6–6.2)
SODIUM SERPL-SCNC: 138 MMOL/L (ref 136–145)
WBC # BLD AUTO: 4.48 K/UL (ref 3.9–12.7)

## 2021-02-25 PROCEDURE — 85730 THROMBOPLASTIN TIME PARTIAL: CPT

## 2021-02-25 PROCEDURE — 99217 PR OBSERVATION CARE DISCHARGE: CPT | Mod: ,,, | Performed by: INTERNAL MEDICINE

## 2021-02-25 PROCEDURE — 80048 BASIC METABOLIC PNL TOTAL CA: CPT

## 2021-02-25 PROCEDURE — 83615 LACTATE (LD) (LDH) ENZYME: CPT

## 2021-02-25 PROCEDURE — 84100 ASSAY OF PHOSPHORUS: CPT

## 2021-02-25 PROCEDURE — 25000003 PHARM REV CODE 250: Performed by: PHYSICIAN ASSISTANT

## 2021-02-25 PROCEDURE — 25000003 PHARM REV CODE 250: Performed by: STUDENT IN AN ORGANIZED HEALTH CARE EDUCATION/TRAINING PROGRAM

## 2021-02-25 PROCEDURE — 83735 ASSAY OF MAGNESIUM: CPT

## 2021-02-25 PROCEDURE — 93750 PR INTERROGATE VENT ASSIST DEV, IN PERSON, W PHYSICIAN ANALYSIS: ICD-10-PCS | Mod: ,,, | Performed by: INTERNAL MEDICINE

## 2021-02-25 PROCEDURE — 36415 COLL VENOUS BLD VENIPUNCTURE: CPT

## 2021-02-25 PROCEDURE — 27000248 HC VAD-ADDITIONAL DAY

## 2021-02-25 PROCEDURE — 93750 INTERROGATION VAD IN PERSON: CPT | Mod: ,,, | Performed by: INTERNAL MEDICINE

## 2021-02-25 PROCEDURE — 99217 PR OBSERVATION CARE DISCHARGE: ICD-10-PCS | Mod: ,,, | Performed by: INTERNAL MEDICINE

## 2021-02-25 PROCEDURE — 85025 COMPLETE CBC W/AUTO DIFF WBC: CPT

## 2021-02-25 PROCEDURE — G0378 HOSPITAL OBSERVATION PER HR: HCPCS

## 2021-02-25 PROCEDURE — 85610 PROTHROMBIN TIME: CPT

## 2021-02-25 RX ORDER — WARFARIN SODIUM 5 MG/1
TABLET ORAL
Qty: 45 TABLET | Refills: 11 | Status: SHIPPED | OUTPATIENT
Start: 2021-02-25 | End: 2021-05-10 | Stop reason: SDUPTHER

## 2021-02-25 RX ORDER — ALPRAZOLAM 1 MG/1
1 TABLET ORAL 2 TIMES DAILY
Qty: 30 TABLET | Refills: 0 | Status: SHIPPED | OUTPATIENT
Start: 2021-02-25 | End: 2021-03-05 | Stop reason: SDUPTHER

## 2021-02-25 RX ORDER — ZOLPIDEM TARTRATE 10 MG/1
10 TABLET ORAL NIGHTLY PRN
Qty: 30 TABLET | Refills: 5 | Status: SHIPPED | OUTPATIENT
Start: 2021-02-25 | End: 2021-03-25 | Stop reason: SDUPTHER

## 2021-02-25 RX ORDER — AMIODARONE HYDROCHLORIDE 400 MG/1
400 TABLET ORAL DAILY
Qty: 30 TABLET | Refills: 11 | Status: SHIPPED | OUTPATIENT
Start: 2021-02-26 | End: 2022-01-19 | Stop reason: SDUPTHER

## 2021-02-25 RX ORDER — ASPIRIN 81 MG/1
81 TABLET ORAL DAILY
Qty: 30 TABLET | Refills: 11 | Status: SHIPPED | OUTPATIENT
Start: 2021-02-25 | End: 2022-01-19 | Stop reason: SDUPTHER

## 2021-02-25 RX ORDER — PAROXETINE HYDROCHLORIDE 20 MG/1
20 TABLET, FILM COATED ORAL DAILY
Qty: 30 TABLET | Refills: 3 | Status: SHIPPED | OUTPATIENT
Start: 2021-02-25 | End: 2021-04-28

## 2021-02-25 RX ORDER — POTASSIUM CHLORIDE 750 MG/1
10 CAPSULE, EXTENDED RELEASE ORAL ONCE
Status: COMPLETED | OUTPATIENT
Start: 2021-02-25 | End: 2021-02-25

## 2021-02-25 RX ORDER — FERROUS GLUCONATE 324(37.5)
324 TABLET ORAL
Qty: 30 TABLET | Refills: 11 | COMMUNITY
Start: 2021-02-25 | End: 2021-05-05 | Stop reason: SDUPTHER

## 2021-02-25 RX ADMIN — POLYETHYLENE GLYCOL 3350 17 G: 17 POWDER, FOR SOLUTION ORAL at 09:02

## 2021-02-25 RX ADMIN — DOXAZOSIN 8 MG: 8 TABLET ORAL at 06:02

## 2021-02-25 RX ADMIN — POTASSIUM CHLORIDE 10 MEQ: 10 CAPSULE, COATED, EXTENDED RELEASE ORAL at 09:02

## 2021-02-25 RX ADMIN — PANTOPRAZOLE SODIUM 40 MG: 40 TABLET, DELAYED RELEASE ORAL at 01:02

## 2021-02-25 RX ADMIN — FERROUS GLUCONATE TAB 324 MG (37.5 MG ELEMENTAL IRON) 324 MG: 324 (37.5 FE) TAB at 01:02

## 2021-02-25 RX ADMIN — POTASSIUM CHLORIDE 20 MEQ: 1500 TABLET, EXTENDED RELEASE ORAL at 09:02

## 2021-02-25 RX ADMIN — LEVOTHYROXINE SODIUM 100 MCG: 100 TABLET ORAL at 05:02

## 2021-02-25 RX ADMIN — PAROXETINE HYDROCHLORIDE 10 MG: 10 TABLET, FILM COATED ORAL at 09:02

## 2021-02-25 RX ADMIN — TORSEMIDE 40 MG: 20 TABLET ORAL at 09:02

## 2021-02-25 RX ADMIN — AMLODIPINE BESYLATE 10 MG: 10 TABLET ORAL at 06:02

## 2021-02-25 RX ADMIN — AMIODARONE HYDROCHLORIDE 400 MG: 200 TABLET ORAL at 09:02

## 2021-02-26 ENCOUNTER — TELEPHONE (OUTPATIENT)
Dept: TRANSPLANT | Facility: CLINIC | Age: 56
End: 2021-02-26

## 2021-03-03 ENCOUNTER — PATIENT MESSAGE (OUTPATIENT)
Dept: ADMINISTRATIVE | Facility: CLINIC | Age: 56
End: 2021-03-03

## 2021-03-05 ENCOUNTER — OFFICE VISIT (OUTPATIENT)
Dept: FAMILY MEDICINE | Facility: CLINIC | Age: 56
End: 2021-03-05
Payer: COMMERCIAL

## 2021-03-05 VITALS
TEMPERATURE: 98 F | RESPIRATION RATE: 16 BRPM | BODY MASS INDEX: 38.3 KG/M2 | HEIGHT: 73 IN | WEIGHT: 289 LBS | HEART RATE: 82 BPM

## 2021-03-05 DIAGNOSIS — F32.A DEPRESSION, UNSPECIFIED DEPRESSION TYPE: ICD-10-CM

## 2021-03-05 DIAGNOSIS — Z95.811 PRESENCE OF LEFT VENTRICULAR ASSIST DEVICE (LVAD): ICD-10-CM

## 2021-03-05 DIAGNOSIS — N18.32 STAGE 3B CHRONIC KIDNEY DISEASE: ICD-10-CM

## 2021-03-05 DIAGNOSIS — Z95.810 ICD (IMPLANTABLE CARDIOVERTER-DEFIBRILLATOR) IN PLACE: ICD-10-CM

## 2021-03-05 DIAGNOSIS — F41.9 ANXIETY: ICD-10-CM

## 2021-03-05 DIAGNOSIS — E05.90 HYPERTHYROIDISM: ICD-10-CM

## 2021-03-05 DIAGNOSIS — Z23 NEED FOR INFLUENZA VACCINATION: ICD-10-CM

## 2021-03-05 PROCEDURE — 99214 PR OFFICE/OUTPT VISIT, EST, LEVL IV, 30-39 MIN: ICD-10-PCS | Mod: 25,S$GLB,, | Performed by: INTERNAL MEDICINE

## 2021-03-05 PROCEDURE — 99214 OFFICE O/P EST MOD 30 MIN: CPT | Mod: 25,S$GLB,, | Performed by: INTERNAL MEDICINE

## 2021-03-05 PROCEDURE — 99999 PR PBB SHADOW E&M-EST. PATIENT-LVL V: CPT | Mod: PBBFAC,,, | Performed by: INTERNAL MEDICINE

## 2021-03-05 PROCEDURE — 90471 FLU VACCINE (QUAD) GREATER THAN OR EQUAL TO 3YO PRESERVATIVE FREE IM: ICD-10-PCS | Mod: S$GLB,,, | Performed by: INTERNAL MEDICINE

## 2021-03-05 PROCEDURE — 90471 IMMUNIZATION ADMIN: CPT | Mod: S$GLB,,, | Performed by: INTERNAL MEDICINE

## 2021-03-05 PROCEDURE — 90686 FLU VACCINE (QUAD) GREATER THAN OR EQUAL TO 3YO PRESERVATIVE FREE IM: ICD-10-PCS | Mod: S$GLB,,, | Performed by: INTERNAL MEDICINE

## 2021-03-05 PROCEDURE — 90686 IIV4 VACC NO PRSV 0.5 ML IM: CPT | Mod: S$GLB,,, | Performed by: INTERNAL MEDICINE

## 2021-03-05 PROCEDURE — 99999 PR PBB SHADOW E&M-EST. PATIENT-LVL V: ICD-10-PCS | Mod: PBBFAC,,, | Performed by: INTERNAL MEDICINE

## 2021-03-05 RX ORDER — ALPRAZOLAM 1 MG/1
TABLET ORAL
Qty: 30 TABLET | Refills: 0 | Status: SHIPPED | OUTPATIENT
Start: 2021-03-05 | End: 2021-04-28 | Stop reason: SDUPTHER

## 2021-03-08 ENCOUNTER — TELEPHONE (OUTPATIENT)
Dept: FAMILY MEDICINE | Facility: CLINIC | Age: 56
End: 2021-03-08

## 2021-03-11 ENCOUNTER — LAB VISIT (OUTPATIENT)
Dept: LAB | Facility: HOSPITAL | Age: 56
End: 2021-03-11
Attending: INTERNAL MEDICINE
Payer: COMMERCIAL

## 2021-03-11 ENCOUNTER — ANTI-COAG VISIT (OUTPATIENT)
Dept: CARDIOLOGY | Facility: CLINIC | Age: 56
End: 2021-03-11
Payer: COMMERCIAL

## 2021-03-11 DIAGNOSIS — Z95.811 LVAD (LEFT VENTRICULAR ASSIST DEVICE) PRESENT: ICD-10-CM

## 2021-03-11 DIAGNOSIS — Z79.01 ANTICOAGULATION MONITORING, INR RANGE 2-3: ICD-10-CM

## 2021-03-11 DIAGNOSIS — E03.2 HYPOTHYROIDISM DUE TO AMIODARONE: ICD-10-CM

## 2021-03-11 DIAGNOSIS — Z95.811 LVAD (LEFT VENTRICULAR ASSIST DEVICE) PRESENT: Primary | ICD-10-CM

## 2021-03-11 DIAGNOSIS — T46.2X1A HYPOTHYROIDISM DUE TO AMIODARONE: ICD-10-CM

## 2021-03-11 LAB
INR PPP: 2 (ref 0.8–1.2)
PROTHROMBIN TIME: 20.8 SEC (ref 9–12.5)
TSH SERPL DL<=0.005 MIU/L-ACNC: 2.74 UIU/ML (ref 0.4–4)

## 2021-03-11 PROCEDURE — 85610 PROTHROMBIN TIME: CPT | Performed by: INTERNAL MEDICINE

## 2021-03-11 PROCEDURE — 84443 ASSAY THYROID STIM HORMONE: CPT | Performed by: INTERNAL MEDICINE

## 2021-03-11 PROCEDURE — 93793 PR ANTICOAGULANT MGMT FOR PT TAKING WARFARIN: ICD-10-PCS | Mod: S$GLB,,,

## 2021-03-11 PROCEDURE — 36415 COLL VENOUS BLD VENIPUNCTURE: CPT | Performed by: INTERNAL MEDICINE

## 2021-03-11 PROCEDURE — 93793 ANTICOAG MGMT PT WARFARIN: CPT | Mod: S$GLB,,,

## 2021-03-13 NOTE — ASSESSMENT & PLAN NOTE
- S/P DT HM 2 3/8/18 with outflow graft exchange 3/9/18 (Dr. Kaufman)  - Current speed 9800 (decreased at clinic visit 2/27 with plan to repeat echo 3/26  - INR goal 2.0 - 3.0. INR is 2.0 today - continue Coumadin  - LDH stable today at 560  (baseline seems to be 300's - 500's)  - Doppler goal is 60-80. Dopplers have been as high as 96. Continue Norvasc and Doxazosin. Increased Isordil. Has ACE and Hydralazine allergies. Patient monitors BP at home and will continue to do so  - D/C home on Doxycycline 100 po bid and Cefadroxil 1000 mg bid to complete 7 day course of antiibiotics (IV Vanc and Cefepime D/C'd this morning). He will complete course of Tamiflu at home as well. Follow up in the device clinic 3/20 and with Dr. Rhodes 3/26    Procedure: Device Interrogation Including analysis of device parameters  Current Settings: Ventricular Assist Device  Review of device function is stable/unstable     TXP LVAD INTERROGATIONS 3/13/2020 3/13/2020 3/12/2020 3/12/2020 3/12/2020 3/12/2020 3/12/2020   Type HeartMate II HeartMate II HeartMate II HeartMate II HeartMate II HeartMate II HeartMate II   Flow 5.4 6.6 6.1 6.0 5.3 5.4 5.8   Speed 9800 9800 9800 9800 9800 9800 9800   PI 4. 2.9 3.4 3.2 3.7 4.2 4.2   Power (Jackson) 6.4 7.1 6.8 6.7 6.3 6.4 6.7   LSL 9400 9400 9400 9400 - - 9400   Pulsatility Intermittent pulse No Pulse No Pulse No Pulse - - No Pulse      - - -

## 2021-03-16 ENCOUNTER — CLINICAL SUPPORT (OUTPATIENT)
Dept: CARDIOLOGY | Facility: HOSPITAL | Age: 56
End: 2021-03-16
Payer: COMMERCIAL

## 2021-03-16 DIAGNOSIS — Z95.810 PRESENCE OF AUTOMATIC (IMPLANTABLE) CARDIAC DEFIBRILLATOR: ICD-10-CM

## 2021-03-16 PROCEDURE — 93295 DEV INTERROG REMOTE 1/2/MLT: CPT | Mod: ,,, | Performed by: INTERNAL MEDICINE

## 2021-03-16 PROCEDURE — 93296 REM INTERROG EVL PM/IDS: CPT | Performed by: INTERNAL MEDICINE

## 2021-03-16 PROCEDURE — 93295 CARDIAC DEVICE CHECK - REMOTE: ICD-10-PCS | Mod: ,,, | Performed by: INTERNAL MEDICINE

## 2021-03-17 ENCOUNTER — PATIENT MESSAGE (OUTPATIENT)
Dept: FAMILY MEDICINE | Facility: CLINIC | Age: 56
End: 2021-03-17

## 2021-03-17 ENCOUNTER — PATIENT MESSAGE (OUTPATIENT)
Dept: CARDIOLOGY | Facility: CLINIC | Age: 56
End: 2021-03-17

## 2021-03-24 ENCOUNTER — TELEPHONE (OUTPATIENT)
Dept: CARDIOLOGY | Facility: HOSPITAL | Age: 56
End: 2021-03-24

## 2021-03-25 ENCOUNTER — OFFICE VISIT (OUTPATIENT)
Dept: TRANSPLANT | Facility: CLINIC | Age: 56
End: 2021-03-25
Payer: COMMERCIAL

## 2021-03-25 ENCOUNTER — HOSPITAL ENCOUNTER (OUTPATIENT)
Dept: PULMONOLOGY | Facility: CLINIC | Age: 56
Discharge: HOME | End: 2021-03-25
Payer: COMMERCIAL

## 2021-03-25 ENCOUNTER — ANTI-COAG VISIT (OUTPATIENT)
Dept: CARDIOLOGY | Facility: CLINIC | Age: 56
End: 2021-03-25
Payer: COMMERCIAL

## 2021-03-25 ENCOUNTER — CLINICAL SUPPORT (OUTPATIENT)
Dept: TRANSPLANT | Facility: CLINIC | Age: 56
End: 2021-03-25
Payer: COMMERCIAL

## 2021-03-25 ENCOUNTER — LAB VISIT (OUTPATIENT)
Dept: LAB | Facility: HOSPITAL | Age: 56
End: 2021-03-25
Attending: INTERNAL MEDICINE
Payer: COMMERCIAL

## 2021-03-25 VITALS — SYSTOLIC BLOOD PRESSURE: 78 MMHG | BODY MASS INDEX: 36.71 KG/M2 | HEIGHT: 73 IN | WEIGHT: 277 LBS | TEMPERATURE: 98 F

## 2021-03-25 VITALS — BODY MASS INDEX: 36.98 KG/M2 | WEIGHT: 279 LBS | HEIGHT: 73 IN

## 2021-03-25 DIAGNOSIS — F41.9 ANXIETY: ICD-10-CM

## 2021-03-25 DIAGNOSIS — Z95.811 LVAD (LEFT VENTRICULAR ASSIST DEVICE) PRESENT: Primary | ICD-10-CM

## 2021-03-25 DIAGNOSIS — Z45.02 IMPLANTABLE CARDIOVERTER-DEFIBRILLATOR (ICD) DISCHARGE: ICD-10-CM

## 2021-03-25 DIAGNOSIS — I10 ESSENTIAL HYPERTENSION: Chronic | ICD-10-CM

## 2021-03-25 DIAGNOSIS — Z95.811 PRESENCE OF LEFT VENTRICULAR ASSIST DEVICE (LVAD): ICD-10-CM

## 2021-03-25 DIAGNOSIS — Z79.01 ANTICOAGULATION MONITORING, INR RANGE 2-3: ICD-10-CM

## 2021-03-25 DIAGNOSIS — I42.0 DILATED CARDIOMYOPATHY: ICD-10-CM

## 2021-03-25 DIAGNOSIS — Z95.811 LVAD (LEFT VENTRICULAR ASSIST DEVICE) PRESENT: ICD-10-CM

## 2021-03-25 DIAGNOSIS — R00.2 PALPITATIONS: Chronic | ICD-10-CM

## 2021-03-25 DIAGNOSIS — Z01.818 PRE-TRANSPLANT EVALUATION FOR HEART TRANSPLANT: ICD-10-CM

## 2021-03-25 DIAGNOSIS — I50.42 CHRONIC COMBINED SYSTOLIC AND DIASTOLIC HEART FAILURE: Primary | ICD-10-CM

## 2021-03-25 DIAGNOSIS — Z45.02 ICD (IMPLANTABLE CARDIOVERTER-DEFIBRILLATOR) DISCHARGE: ICD-10-CM

## 2021-03-25 DIAGNOSIS — Z95.811 LVAD (LEFT VENTRICULAR ASSIST DEVICE) PRESENT: Chronic | ICD-10-CM

## 2021-03-25 DIAGNOSIS — I13.0 HYPERTENSIVE CARDIOVASCULAR-RENAL DISEASE, STAGE 1-4 OR UNSPECIFIED CHRONIC KIDNEY DISEASE, WITH HEART FAILURE: ICD-10-CM

## 2021-03-25 DIAGNOSIS — G47.00 INSOMNIA, UNSPECIFIED TYPE: ICD-10-CM

## 2021-03-25 DIAGNOSIS — N18.32 STAGE 3B CHRONIC KIDNEY DISEASE: Chronic | ICD-10-CM

## 2021-03-25 DIAGNOSIS — T82.9XXA COMPLICATION INVOLVING LEFT VENTRICULAR ASSIST DEVICE (LVAD), INITIAL ENCOUNTER: ICD-10-CM

## 2021-03-25 LAB
ALBUMIN SERPL BCP-MCNC: 4.1 G/DL (ref 3.5–5.2)
ALP SERPL-CCNC: 125 U/L (ref 55–135)
ALT SERPL W/O P-5'-P-CCNC: 16 U/L (ref 10–44)
ANION GAP SERPL CALC-SCNC: 10 MMOL/L (ref 8–16)
AST SERPL-CCNC: 20 U/L (ref 10–40)
BASOPHILS # BLD AUTO: 0.01 K/UL (ref 0–0.2)
BASOPHILS NFR BLD: 0.3 % (ref 0–1.9)
BILIRUB DIRECT SERPL-MCNC: 0.2 MG/DL (ref 0.1–0.3)
BILIRUB SERPL-MCNC: 0.6 MG/DL (ref 0.1–1)
BNP SERPL-MCNC: 85 PG/ML (ref 0–99)
BUN SERPL-MCNC: 19 MG/DL (ref 6–20)
CALCIUM SERPL-MCNC: 9.4 MG/DL (ref 8.7–10.5)
CHLORIDE SERPL-SCNC: 103 MMOL/L (ref 95–110)
CO2 SERPL-SCNC: 26 MMOL/L (ref 23–29)
CREAT SERPL-MCNC: 2 MG/DL (ref 0.5–1.4)
CRP SERPL-MCNC: 11.7 MG/L (ref 0–8.2)
DIFFERENTIAL METHOD: ABNORMAL
EOSINOPHIL # BLD AUTO: 0.1 K/UL (ref 0–0.5)
EOSINOPHIL NFR BLD: 1.3 % (ref 0–8)
ERYTHROCYTE [DISTWIDTH] IN BLOOD BY AUTOMATED COUNT: 16.2 % (ref 11.5–14.5)
EST. GFR  (AFRICAN AMERICAN): 42.5 ML/MIN/1.73 M^2
EST. GFR  (NON AFRICAN AMERICAN): 36.7 ML/MIN/1.73 M^2
GLUCOSE SERPL-MCNC: 97 MG/DL (ref 70–110)
HCT VFR BLD AUTO: 46.5 % (ref 40–54)
HGB BLD-MCNC: 14.4 G/DL (ref 14–18)
IMM GRANULOCYTES # BLD AUTO: 0.01 K/UL (ref 0–0.04)
IMM GRANULOCYTES NFR BLD AUTO: 0.3 % (ref 0–0.5)
INR PPP: 2.2 (ref 0.8–1.2)
LDH SERPL L TO P-CCNC: 289 U/L (ref 110–260)
LYMPHOCYTES # BLD AUTO: 1 K/UL (ref 1–4.8)
LYMPHOCYTES NFR BLD: 26.2 % (ref 18–48)
MAGNESIUM SERPL-MCNC: 2.1 MG/DL (ref 1.6–2.6)
MCH RBC QN AUTO: 27 PG (ref 27–31)
MCHC RBC AUTO-ENTMCNC: 31 G/DL (ref 32–36)
MCV RBC AUTO: 87 FL (ref 82–98)
MONOCYTES # BLD AUTO: 0.5 K/UL (ref 0.3–1)
MONOCYTES NFR BLD: 13 % (ref 4–15)
NEUTROPHILS # BLD AUTO: 2.2 K/UL (ref 1.8–7.7)
NEUTROPHILS NFR BLD: 58.9 % (ref 38–73)
NRBC BLD-RTO: 0 /100 WBC
PHOSPHATE SERPL-MCNC: 2.4 MG/DL (ref 2.7–4.5)
PLATELET # BLD AUTO: 199 K/UL (ref 150–350)
PMV BLD AUTO: 11 FL (ref 9.2–12.9)
POTASSIUM SERPL-SCNC: 3.8 MMOL/L (ref 3.5–5.1)
PREALB SERPL-MCNC: 24 MG/DL (ref 20–43)
PROT SERPL-MCNC: 8.2 G/DL (ref 6–8.4)
PROTHROMBIN TIME: 22.6 SEC (ref 9–12.5)
RBC # BLD AUTO: 5.33 M/UL (ref 4.6–6.2)
SODIUM SERPL-SCNC: 139 MMOL/L (ref 136–145)
WBC # BLD AUTO: 3.78 K/UL (ref 3.9–12.7)

## 2021-03-25 PROCEDURE — 99214 OFFICE O/P EST MOD 30 MIN: CPT | Mod: S$GLB,,, | Performed by: INTERNAL MEDICINE

## 2021-03-25 PROCEDURE — 86140 C-REACTIVE PROTEIN: CPT | Performed by: INTERNAL MEDICINE

## 2021-03-25 PROCEDURE — 85025 COMPLETE CBC W/AUTO DIFF WBC: CPT | Performed by: INTERNAL MEDICINE

## 2021-03-25 PROCEDURE — 99214 PR OFFICE/OUTPT VISIT, EST, LEVL IV, 30-39 MIN: ICD-10-PCS | Mod: S$GLB,,, | Performed by: INTERNAL MEDICINE

## 2021-03-25 PROCEDURE — 36415 COLL VENOUS BLD VENIPUNCTURE: CPT | Performed by: INTERNAL MEDICINE

## 2021-03-25 PROCEDURE — 85610 PROTHROMBIN TIME: CPT | Performed by: INTERNAL MEDICINE

## 2021-03-25 PROCEDURE — 82248 BILIRUBIN DIRECT: CPT | Performed by: INTERNAL MEDICINE

## 2021-03-25 PROCEDURE — 84100 ASSAY OF PHOSPHORUS: CPT | Performed by: INTERNAL MEDICINE

## 2021-03-25 PROCEDURE — 83615 LACTATE (LD) (LDH) ENZYME: CPT | Performed by: INTERNAL MEDICINE

## 2021-03-25 PROCEDURE — 84134 ASSAY OF PREALBUMIN: CPT | Performed by: INTERNAL MEDICINE

## 2021-03-25 PROCEDURE — 99999 PR PBB SHADOW E&M-EST. PATIENT-LVL V: CPT | Mod: PBBFAC,,, | Performed by: INTERNAL MEDICINE

## 2021-03-25 PROCEDURE — 94618 PULMONARY STRESS TESTING: CPT | Mod: S$GLB,,, | Performed by: INTERNAL MEDICINE

## 2021-03-25 PROCEDURE — 99999 PR PBB SHADOW E&M-EST. PATIENT-LVL V: ICD-10-PCS | Mod: PBBFAC,,, | Performed by: INTERNAL MEDICINE

## 2021-03-25 PROCEDURE — 80053 COMPREHEN METABOLIC PANEL: CPT | Performed by: INTERNAL MEDICINE

## 2021-03-25 PROCEDURE — 83880 ASSAY OF NATRIURETIC PEPTIDE: CPT | Performed by: INTERNAL MEDICINE

## 2021-03-25 PROCEDURE — 94618 PULMONARY STRESS TESTING: ICD-10-PCS | Mod: S$GLB,,, | Performed by: INTERNAL MEDICINE

## 2021-03-25 PROCEDURE — 83735 ASSAY OF MAGNESIUM: CPT | Performed by: INTERNAL MEDICINE

## 2021-03-25 RX ORDER — DOXAZOSIN 8 MG/1
8 TABLET ORAL EVERY 12 HOURS
Qty: 60 TABLET | Refills: 11 | Status: SHIPPED | OUTPATIENT
Start: 2021-03-25 | End: 2022-01-19

## 2021-03-25 RX ORDER — ZOLPIDEM TARTRATE 10 MG/1
10 TABLET ORAL NIGHTLY
Qty: 30 TABLET | Refills: 5 | Status: SHIPPED | OUTPATIENT
Start: 2021-03-25 | End: 2021-03-25

## 2021-03-25 RX ORDER — ZOLPIDEM TARTRATE 10 MG/1
10 TABLET ORAL NIGHTLY PRN
Qty: 30 TABLET | Refills: 0 | Status: SHIPPED | OUTPATIENT
Start: 2021-03-25 | End: 2021-04-28

## 2021-03-25 RX ORDER — TORSEMIDE 20 MG/1
40 TABLET ORAL DAILY
Qty: 180 TABLET | Refills: 3 | Status: SHIPPED | OUTPATIENT
Start: 2021-03-25 | End: 2021-07-23 | Stop reason: SDUPTHER

## 2021-03-27 RX ORDER — ALPRAZOLAM 1 MG/1
TABLET ORAL
Qty: 30 TABLET | Refills: 3 | OUTPATIENT
Start: 2021-03-27

## 2021-04-05 ENCOUNTER — CLINICAL SUPPORT (OUTPATIENT)
Dept: TRANSPLANT | Facility: CLINIC | Age: 56
End: 2021-04-05
Payer: COMMERCIAL

## 2021-04-06 ENCOUNTER — PATIENT MESSAGE (OUTPATIENT)
Dept: TRANSPLANT | Facility: CLINIC | Age: 56
End: 2021-04-06

## 2021-04-06 ENCOUNTER — TELEPHONE (OUTPATIENT)
Dept: TRANSPLANT | Facility: CLINIC | Age: 56
End: 2021-04-06

## 2021-04-07 ENCOUNTER — PATIENT MESSAGE (OUTPATIENT)
Dept: FAMILY MEDICINE | Facility: CLINIC | Age: 56
End: 2021-04-07

## 2021-04-09 ENCOUNTER — PATIENT OUTREACH (OUTPATIENT)
Dept: ADMINISTRATIVE | Facility: HOSPITAL | Age: 56
End: 2021-04-09

## 2021-04-13 ENCOUNTER — PATIENT MESSAGE (OUTPATIENT)
Dept: TRANSPLANT | Facility: CLINIC | Age: 56
End: 2021-04-13

## 2021-04-13 ENCOUNTER — LAB VISIT (OUTPATIENT)
Dept: LAB | Facility: HOSPITAL | Age: 56
End: 2021-04-13
Attending: INTERNAL MEDICINE
Payer: COMMERCIAL

## 2021-04-13 ENCOUNTER — ANTI-COAG VISIT (OUTPATIENT)
Dept: CARDIOLOGY | Facility: CLINIC | Age: 56
End: 2021-04-13
Payer: COMMERCIAL

## 2021-04-13 DIAGNOSIS — Z79.01 ANTICOAGULATION MONITORING, INR RANGE 2-3: ICD-10-CM

## 2021-04-13 DIAGNOSIS — Z95.811 LVAD (LEFT VENTRICULAR ASSIST DEVICE) PRESENT: ICD-10-CM

## 2021-04-13 DIAGNOSIS — Z95.811 LVAD (LEFT VENTRICULAR ASSIST DEVICE) PRESENT: Primary | ICD-10-CM

## 2021-04-13 LAB
INR PPP: 2.4 (ref 0.8–1.2)
PROTHROMBIN TIME: 23.8 SEC (ref 9–12.5)

## 2021-04-13 PROCEDURE — 36415 COLL VENOUS BLD VENIPUNCTURE: CPT | Performed by: INTERNAL MEDICINE

## 2021-04-13 PROCEDURE — 85610 PROTHROMBIN TIME: CPT | Performed by: INTERNAL MEDICINE

## 2021-04-13 PROCEDURE — 93793 PR ANTICOAGULANT MGMT FOR PT TAKING WARFARIN: ICD-10-PCS | Mod: S$GLB,,,

## 2021-04-13 PROCEDURE — 93793 ANTICOAG MGMT PT WARFARIN: CPT | Mod: S$GLB,,,

## 2021-04-19 ENCOUNTER — TELEPHONE (OUTPATIENT)
Dept: CARDIOLOGY | Facility: HOSPITAL | Age: 56
End: 2021-04-19

## 2021-04-19 ENCOUNTER — PATIENT MESSAGE (OUTPATIENT)
Dept: TRANSPLANT | Facility: CLINIC | Age: 56
End: 2021-04-19

## 2021-04-19 ENCOUNTER — PATIENT MESSAGE (OUTPATIENT)
Dept: ENDOCRINOLOGY | Facility: CLINIC | Age: 56
End: 2021-04-19

## 2021-04-19 ENCOUNTER — TELEPHONE (OUTPATIENT)
Dept: ELECTROPHYSIOLOGY | Facility: CLINIC | Age: 56
End: 2021-04-19

## 2021-04-19 DIAGNOSIS — E03.2 HYPOTHYROIDISM DUE TO AMIODARONE: Primary | ICD-10-CM

## 2021-04-19 DIAGNOSIS — T46.2X1A HYPOTHYROIDISM DUE TO AMIODARONE: Primary | ICD-10-CM

## 2021-04-19 RX ORDER — MEXILETINE HYDROCHLORIDE 200 MG/1
200 CAPSULE ORAL EVERY 8 HOURS
Qty: 270 CAPSULE | Refills: 3 | Status: SHIPPED | OUTPATIENT
Start: 2021-04-19 | End: 2021-04-20 | Stop reason: SDUPTHER

## 2021-04-20 ENCOUNTER — LAB VISIT (OUTPATIENT)
Dept: LAB | Facility: HOSPITAL | Age: 56
End: 2021-04-20
Attending: INTERNAL MEDICINE
Payer: COMMERCIAL

## 2021-04-20 ENCOUNTER — TELEPHONE (OUTPATIENT)
Dept: CARDIOLOGY | Facility: HOSPITAL | Age: 56
End: 2021-04-20

## 2021-04-20 ENCOUNTER — ANTI-COAG VISIT (OUTPATIENT)
Dept: CARDIOLOGY | Facility: CLINIC | Age: 56
End: 2021-04-20
Payer: COMMERCIAL

## 2021-04-20 DIAGNOSIS — Z95.811 LVAD (LEFT VENTRICULAR ASSIST DEVICE) PRESENT: Primary | ICD-10-CM

## 2021-04-20 DIAGNOSIS — T46.2X1A HYPOTHYROIDISM DUE TO AMIODARONE: ICD-10-CM

## 2021-04-20 DIAGNOSIS — Z79.01 ANTICOAGULATION MONITORING, INR RANGE 2-3: ICD-10-CM

## 2021-04-20 DIAGNOSIS — E03.2 HYPOTHYROIDISM DUE TO AMIODARONE: ICD-10-CM

## 2021-04-20 DIAGNOSIS — Z95.811 LVAD (LEFT VENTRICULAR ASSIST DEVICE) PRESENT: ICD-10-CM

## 2021-04-20 LAB
INR PPP: 2.9 (ref 0.8–1.2)
PROTHROMBIN TIME: 29.2 SEC (ref 9–12.5)
TSH SERPL DL<=0.005 MIU/L-ACNC: 3.64 UIU/ML (ref 0.4–4)

## 2021-04-20 PROCEDURE — 93793 PR ANTICOAGULANT MGMT FOR PT TAKING WARFARIN: ICD-10-PCS | Mod: S$GLB,,,

## 2021-04-20 PROCEDURE — 85610 PROTHROMBIN TIME: CPT | Performed by: INTERNAL MEDICINE

## 2021-04-20 PROCEDURE — 84443 ASSAY THYROID STIM HORMONE: CPT | Performed by: INTERNAL MEDICINE

## 2021-04-20 PROCEDURE — 93793 ANTICOAG MGMT PT WARFARIN: CPT | Mod: S$GLB,,,

## 2021-04-20 PROCEDURE — 36415 COLL VENOUS BLD VENIPUNCTURE: CPT | Performed by: INTERNAL MEDICINE

## 2021-04-20 RX ORDER — MEXILETINE HYDROCHLORIDE 200 MG/1
200 CAPSULE ORAL EVERY 8 HOURS
Qty: 90 CAPSULE | Refills: 11 | Status: SHIPPED | OUTPATIENT
Start: 2021-04-20 | End: 2021-05-17 | Stop reason: SDUPTHER

## 2021-04-22 ENCOUNTER — PATIENT MESSAGE (OUTPATIENT)
Dept: FAMILY MEDICINE | Facility: CLINIC | Age: 56
End: 2021-04-22

## 2021-04-23 DIAGNOSIS — Z95.811 LVAD (LEFT VENTRICULAR ASSIST DEVICE) PRESENT: Primary | ICD-10-CM

## 2021-04-26 ENCOUNTER — OFFICE VISIT (OUTPATIENT)
Dept: INTERNAL MEDICINE | Facility: CLINIC | Age: 56
End: 2021-04-26
Payer: COMMERCIAL

## 2021-04-26 ENCOUNTER — LAB VISIT (OUTPATIENT)
Dept: LAB | Facility: HOSPITAL | Age: 56
End: 2021-04-26
Attending: INTERNAL MEDICINE
Payer: COMMERCIAL

## 2021-04-26 ENCOUNTER — ANTI-COAG VISIT (OUTPATIENT)
Dept: CARDIOLOGY | Facility: CLINIC | Age: 56
End: 2021-04-26
Payer: COMMERCIAL

## 2021-04-26 DIAGNOSIS — Z79.01 ANTICOAGULATION MONITORING, INR RANGE 2-3: ICD-10-CM

## 2021-04-26 DIAGNOSIS — F41.9 ANXIETY: ICD-10-CM

## 2021-04-26 DIAGNOSIS — I10 ESSENTIAL HYPERTENSION: ICD-10-CM

## 2021-04-26 DIAGNOSIS — Z12.2 ENCOUNTER FOR SCREENING FOR MALIGNANT NEOPLASM OF RESPIRATORY ORGANS: ICD-10-CM

## 2021-04-26 DIAGNOSIS — Z00.00 ANNUAL PHYSICAL EXAM: Primary | ICD-10-CM

## 2021-04-26 DIAGNOSIS — Z95.811 LVAD (LEFT VENTRICULAR ASSIST DEVICE) PRESENT: ICD-10-CM

## 2021-04-26 DIAGNOSIS — Z95.811 LVAD (LEFT VENTRICULAR ASSIST DEVICE) PRESENT: Primary | ICD-10-CM

## 2021-04-26 LAB
INR PPP: 2.5 (ref 0.8–1.2)
PROTHROMBIN TIME: 25.6 SEC (ref 9–12.5)

## 2021-04-26 PROCEDURE — 36415 COLL VENOUS BLD VENIPUNCTURE: CPT | Performed by: INTERNAL MEDICINE

## 2021-04-26 PROCEDURE — 99203 PR OFFICE/OUTPT VISIT, NEW, LEVL III, 30-44 MIN: ICD-10-PCS | Mod: S$GLB,,, | Performed by: STUDENT IN AN ORGANIZED HEALTH CARE EDUCATION/TRAINING PROGRAM

## 2021-04-26 PROCEDURE — 93793 ANTICOAG MGMT PT WARFARIN: CPT | Mod: S$GLB,,,

## 2021-04-26 PROCEDURE — 99203 OFFICE O/P NEW LOW 30 MIN: CPT | Mod: S$GLB,,, | Performed by: STUDENT IN AN ORGANIZED HEALTH CARE EDUCATION/TRAINING PROGRAM

## 2021-04-26 PROCEDURE — 99999 PR PBB SHADOW E&M-EST. PATIENT-LVL II: ICD-10-PCS | Mod: PBBFAC,,, | Performed by: STUDENT IN AN ORGANIZED HEALTH CARE EDUCATION/TRAINING PROGRAM

## 2021-04-26 PROCEDURE — 93793 PR ANTICOAGULANT MGMT FOR PT TAKING WARFARIN: ICD-10-PCS | Mod: S$GLB,,,

## 2021-04-26 PROCEDURE — 99999 PR PBB SHADOW E&M-EST. PATIENT-LVL II: CPT | Mod: PBBFAC,,, | Performed by: STUDENT IN AN ORGANIZED HEALTH CARE EDUCATION/TRAINING PROGRAM

## 2021-04-26 PROCEDURE — 85610 PROTHROMBIN TIME: CPT | Performed by: INTERNAL MEDICINE

## 2021-04-28 ENCOUNTER — OFFICE VISIT (OUTPATIENT)
Dept: PSYCHIATRY | Facility: CLINIC | Age: 56
End: 2021-04-28
Payer: COMMERCIAL

## 2021-04-28 VITALS — BODY MASS INDEX: 37.22 KG/M2 | HEART RATE: 88 BPM | WEIGHT: 280.88 LBS | HEIGHT: 73 IN

## 2021-04-28 DIAGNOSIS — F41.1 GENERALIZED ANXIETY DISORDER: Primary | ICD-10-CM

## 2021-04-28 DIAGNOSIS — F43.23 ADJUSTMENT DISORDER WITH MIXED ANXIETY AND DEPRESSED MOOD: ICD-10-CM

## 2021-04-28 PROCEDURE — 99999 PR PBB SHADOW E&M-EST. PATIENT-LVL III: CPT | Mod: PBBFAC,,, | Performed by: PSYCHIATRY & NEUROLOGY

## 2021-04-28 PROCEDURE — 99214 OFFICE O/P EST MOD 30 MIN: CPT | Mod: S$GLB,,, | Performed by: PSYCHIATRY & NEUROLOGY

## 2021-04-28 PROCEDURE — 99999 PR PBB SHADOW E&M-EST. PATIENT-LVL III: ICD-10-PCS | Mod: PBBFAC,,, | Performed by: PSYCHIATRY & NEUROLOGY

## 2021-04-28 PROCEDURE — 99214 PR OFFICE/OUTPT VISIT, EST, LEVL IV, 30-39 MIN: ICD-10-PCS | Mod: S$GLB,,, | Performed by: PSYCHIATRY & NEUROLOGY

## 2021-04-28 RX ORDER — ALPRAZOLAM 1 MG/1
1 TABLET ORAL DAILY PRN
Qty: 30 TABLET | Refills: 1 | Status: SHIPPED | OUTPATIENT
Start: 2021-04-28 | End: 2021-06-14 | Stop reason: SDUPTHER

## 2021-04-28 RX ORDER — SERTRALINE HYDROCHLORIDE 25 MG/1
25 TABLET, FILM COATED ORAL DAILY
Qty: 30 TABLET | Refills: 1 | Status: SHIPPED | OUTPATIENT
Start: 2021-04-28 | End: 2021-06-14

## 2021-04-28 RX ORDER — ZOLPIDEM TARTRATE 12.5 MG/1
12.5 TABLET, FILM COATED, EXTENDED RELEASE ORAL NIGHTLY PRN
Qty: 30 TABLET | Refills: 1 | Status: SHIPPED | OUTPATIENT
Start: 2021-04-28 | End: 2021-06-14 | Stop reason: SDUPTHER

## 2021-04-29 NOTE — SUBJECTIVE & OBJECTIVE
Interval History/Significant Events: Epi off since 3/14.  Cr elevated at 1.9 on lasix 15mL/hr.  On dobutamine and heparin drip.    Follow-up For: Procedure(s) (LRB):  CLOSURE-STERNAL WOUND (N/A)  PLACEMENT-NEOPERICARDIUM    Post-Operative Day: 6 Days Post-Op    Objective:     Vital Signs (Most Recent):  Temp: 97.7 °F (36.5 °C) (03/16/18 0300)  Pulse: 82 (03/16/18 0515)  Resp: 17 (03/16/18 0515)  BP: (!) 80/0 (03/16/18 0300)  SpO2: 99 % (03/16/18 0300) Vital Signs (24h Range):  Temp:  [97.7 °F (36.5 °C)-98.9 °F (37.2 °C)] 97.7 °F (36.5 °C)  Pulse:  [81-93] 82  Resp:  [8-27] 17  SpO2:  [88 %-100 %] 99 %  BP: ()/(0-72) 80/0  Arterial Line BP: (75-85)/(48-71) 85/48     Weight: 124.6 kg (274 lb 11.1 oz)  Body mass index is 36.24 kg/m².      Intake/Output Summary (Last 24 hours) at 03/16/18 0554  Last data filed at 03/16/18 0400   Gross per 24 hour   Intake             1414 ml   Output             2055 ml   Net             -641 ml       Physical Exam   Constitutional: He is oriented to person, place, and time. He appears well-developed and well-nourished.   HENT:   Head: Normocephalic and atraumatic.   Eyes: EOM are normal. Pupils are equal, round, and reactive to light.   Neck: Neck supple.   Cardiovascular:   Mechanical hum   Pulmonary/Chest: Breath sounds normal. No respiratory distress. He has no wheezes.   Abdominal: Soft. He exhibits no distension. There is no tenderness.   Neurological: He is alert and oriented to person, place, and time.   Skin: Skin is warm and dry.       Vents:  Vent Mode: SIMV (03/11/18 0740)  Ventilator Initiated: Yes (03/08/18 1400)  Set Rate: 18 bmp (03/11/18 0740)  Vt Set: 550 mL (03/11/18 0740)  Pressure Support: 10 cmH20 (03/11/18 0740)  PEEP/CPAP: 5 cmH20 (03/11/18 0740)  Oxygen Concentration (%): 28 (03/14/18 2059)  Peak Airway Pressure: 26 cmH2O (03/11/18 0740)  Plateau Pressure: 0 cmH20 (03/11/18 0740)  Total Ve: 10.6 mL (03/11/18 0740)  F/VT Ratio<105 (RSBI): (!) 36.52  What Type Of Note Output Would You Prefer (Optional)?: Bullet Format (03/11/18 0523)    Lines/Drains/Airways     Peripherally Inserted Central Catheter Line                 PICC Triple Lumen 03/13/18 1521 right basilic 2 days          Line                 VAD 03/08/18 1124 Left ventricular assist device HeartMate II 7 days          Peripheral Intravenous Line                 Midline Catheter Insertion/Assessment  - Single Lumen 03/07/18 1130 Right cephalic vein (lateral side of arm) 18g x 8cm 8 days                Significant Labs:    CBC/Anemia Profile:    Recent Labs  Lab 03/15/18  0430   WBC 17.28*   HGB 7.6*   HCT 23.3*      MCV 85   RDW 16.2*        Chemistries:    Recent Labs  Lab 03/15/18  0430 03/15/18  0852 03/15/18  1140 03/15/18  1733 03/15/18  2356    135*  --   --   --    K 4.0  4.0 3.8 4.2 4.0 3.9   CL 93* 91*  --   --   --    CO2 34* 32*  --   --   --    BUN 37* 37*  --   --   --    CREATININE 1.8* 1.9*  --   --   --    CALCIUM 9.9 10.2  --   --   --    ALBUMIN  --  2.9*  --   --   --    PROT  --  7.3  --   --   --    BILITOT  --  3.8*  --   --   --    ALKPHOS  --  174*  --   --   --    ALT  --  38  --   --   --    AST  --  58*  --   --   --    MG 2.1  --  2.1 2.1 2.0   PHOS 3.3  --   --   --   --        All pertinent labs within the past 24 hours have been reviewed.    Significant Imaging:  I have reviewed and interpreted all pertinent imaging results/findings within the past 24 hours.   Is This A New Presentation, Or A Follow-Up?: Acne Females Only: When Was Your Last Menstrual Period?: 4/4/21

## 2021-04-30 DIAGNOSIS — I42.8 NICM (NONISCHEMIC CARDIOMYOPATHY): Primary | ICD-10-CM

## 2021-05-03 ENCOUNTER — PATIENT MESSAGE (OUTPATIENT)
Dept: TRANSPLANT | Facility: CLINIC | Age: 56
End: 2021-05-03

## 2021-05-03 ENCOUNTER — PATIENT MESSAGE (OUTPATIENT)
Dept: ELECTROPHYSIOLOGY | Facility: CLINIC | Age: 56
End: 2021-05-03

## 2021-05-04 ENCOUNTER — TELEPHONE (OUTPATIENT)
Dept: TRANSPLANT | Facility: CLINIC | Age: 56
End: 2021-05-04

## 2021-05-05 ENCOUNTER — OFFICE VISIT (OUTPATIENT)
Dept: TRANSPLANT | Facility: CLINIC | Age: 56
End: 2021-05-05
Payer: COMMERCIAL

## 2021-05-05 ENCOUNTER — HOSPITAL ENCOUNTER (OUTPATIENT)
Dept: CARDIOLOGY | Facility: HOSPITAL | Age: 56
Discharge: HOME | End: 2021-05-05
Attending: INTERNAL MEDICINE
Payer: COMMERCIAL

## 2021-05-05 ENCOUNTER — DOCUMENTATION ONLY (OUTPATIENT)
Dept: CARDIOTHORACIC SURGERY | Facility: CLINIC | Age: 56
End: 2021-05-05

## 2021-05-05 ENCOUNTER — CLINICAL SUPPORT (OUTPATIENT)
Dept: TRANSPLANT | Facility: CLINIC | Age: 56
End: 2021-05-05
Payer: COMMERCIAL

## 2021-05-05 ENCOUNTER — ANTI-COAG VISIT (OUTPATIENT)
Dept: CARDIOLOGY | Facility: CLINIC | Age: 56
End: 2021-05-05
Payer: COMMERCIAL

## 2021-05-05 VITALS — HEIGHT: 73 IN | WEIGHT: 272 LBS | SYSTOLIC BLOOD PRESSURE: 80 MMHG | TEMPERATURE: 98 F | BODY MASS INDEX: 36.05 KG/M2

## 2021-05-05 VITALS — SYSTOLIC BLOOD PRESSURE: 84 MMHG | HEIGHT: 72 IN | HEART RATE: 70 BPM | WEIGHT: 280 LBS | BODY MASS INDEX: 37.93 KG/M2

## 2021-05-05 DIAGNOSIS — Z79.01 ANTICOAGULATION MONITORING, INR RANGE 2-3: ICD-10-CM

## 2021-05-05 DIAGNOSIS — I47.20 VT (VENTRICULAR TACHYCARDIA): Primary | Chronic | ICD-10-CM

## 2021-05-05 DIAGNOSIS — N18.32 STAGE 3B CHRONIC KIDNEY DISEASE: Chronic | ICD-10-CM

## 2021-05-05 DIAGNOSIS — I13.0 HYPERTENSIVE CARDIOVASCULAR-RENAL DISEASE, STAGE 1-4 OR UNSPECIFIED CHRONIC KIDNEY DISEASE, WITH HEART FAILURE: ICD-10-CM

## 2021-05-05 DIAGNOSIS — I50.42 CHRONIC COMBINED SYSTOLIC AND DIASTOLIC HEART FAILURE: ICD-10-CM

## 2021-05-05 DIAGNOSIS — I10 ESSENTIAL HYPERTENSION: ICD-10-CM

## 2021-05-05 DIAGNOSIS — Z95.811 LVAD (LEFT VENTRICULAR ASSIST DEVICE) PRESENT: Primary | ICD-10-CM

## 2021-05-05 DIAGNOSIS — Z95.811 LVAD (LEFT VENTRICULAR ASSIST DEVICE) PRESENT: Chronic | ICD-10-CM

## 2021-05-05 DIAGNOSIS — Z95.811 LVAD (LEFT VENTRICULAR ASSIST DEVICE) PRESENT: ICD-10-CM

## 2021-05-05 LAB
ASCENDING AORTA: 3.34 CM
BSA FOR ECHO PROCEDURE: 2.54 M2
CV ECHO LV RWT: 0.2 CM
DOP CALC LVOT AREA: 4.6 CM2
DOP CALC LVOT DIAMETER: 2.43 CM
ECHO LV POSTERIOR WALL: 0.89 CM (ref 0.6–1.1)
EJECTION FRACTION: 10 %
FRACTIONAL SHORTENING: 14 % (ref 28–44)
INTERVENTRICULAR SEPTUM: 0.99 CM (ref 0.6–1.1)
LA MAJOR: 5.67 CM
LA MINOR: 4.75 CM
LA WIDTH: 4.45 CM
LEFT ATRIUM SIZE: 4.68 CM
LEFT ATRIUM VOLUME INDEX MOD: 37.9 ML/M2
LEFT ATRIUM VOLUME INDEX: 37.2 ML/M2
LEFT ATRIUM VOLUME MOD: 93.35 CM3
LEFT ATRIUM VOLUME: 91.51 CM3
LEFT INTERNAL DIMENSION IN SYSTOLE: 7.75 CM (ref 2.1–4)
LEFT VENTRICLE DIASTOLIC VOLUME INDEX: 154.39 ML/M2
LEFT VENTRICLE DIASTOLIC VOLUME: 379.8 ML
LEFT VENTRICLE MASS INDEX: 189 G/M2
LEFT VENTRICLE SYSTOLIC VOLUME INDEX: 130.4 ML/M2
LEFT VENTRICLE SYSTOLIC VOLUME: 320.78 ML
LEFT VENTRICULAR INTERNAL DIMENSION IN DIASTOLE: 9 CM (ref 3.5–6)
LEFT VENTRICULAR MASS: 465.62 G
MV A" WAVE DURATION": 7.61 MSEC
PULM VEIN S/D RATIO: 1.22
PV PEAK D VEL: 0.27 M/S
PV PEAK S VEL: 0.33 M/S
RA MAJOR: 4.71 CM
RA PRESSURE: 3 MMHG
RA WIDTH: 3.84 CM
RIGHT VENTRICULAR END-DIASTOLIC DIMENSION: 3.66 CM
RV TISSUE DOPPLER FREE WALL SYSTOLIC VELOCITY 1 (APICAL 4 CHAMBER VIEW): 4.38 CM/S
SINUS: 3.2 CM
STJ: 3.35 CM
TDI LATERAL: 0.03 M/S
TDI SEPTAL: 0.07 M/S
TDI: 0.05 M/S
TRICUSPID ANNULAR PLANE SYSTOLIC EXCURSION: 1.78 CM

## 2021-05-05 PROCEDURE — 93306 TTE W/DOPPLER COMPLETE: CPT

## 2021-05-05 PROCEDURE — 93306 TTE W/DOPPLER COMPLETE: CPT | Mod: 26,,, | Performed by: INTERNAL MEDICINE

## 2021-05-05 PROCEDURE — 99999 PR PBB SHADOW E&M-EST. PATIENT-LVL III: CPT | Mod: PBBFAC,,, | Performed by: INTERNAL MEDICINE

## 2021-05-05 PROCEDURE — 93793 PR ANTICOAGULANT MGMT FOR PT TAKING WARFARIN: ICD-10-PCS | Mod: S$GLB,,,

## 2021-05-05 PROCEDURE — 93750 INTERROGATION VAD IN PERSON: CPT | Mod: S$GLB,,, | Performed by: INTERNAL MEDICINE

## 2021-05-05 PROCEDURE — 99999 PR PBB SHADOW E&M-EST. PATIENT-LVL III: ICD-10-PCS | Mod: PBBFAC,,, | Performed by: INTERNAL MEDICINE

## 2021-05-05 PROCEDURE — 93750 OP LVAD INTERROGATION: ICD-10-PCS | Mod: S$GLB,,, | Performed by: INTERNAL MEDICINE

## 2021-05-05 PROCEDURE — 99214 OFFICE O/P EST MOD 30 MIN: CPT | Mod: S$GLB,,, | Performed by: INTERNAL MEDICINE

## 2021-05-05 PROCEDURE — 93306 ECHO (CUPID ONLY): ICD-10-PCS | Mod: 26,,, | Performed by: INTERNAL MEDICINE

## 2021-05-05 PROCEDURE — 99214 PR OFFICE/OUTPT VISIT, EST, LEVL IV, 30-39 MIN: ICD-10-PCS | Mod: S$GLB,,, | Performed by: INTERNAL MEDICINE

## 2021-05-05 PROCEDURE — 93793 ANTICOAG MGMT PT WARFARIN: CPT | Mod: S$GLB,,,

## 2021-05-05 RX ORDER — FERROUS GLUCONATE 324(37.5)
324 TABLET ORAL
Qty: 30 TABLET | Refills: 11 | Status: SHIPPED | OUTPATIENT
Start: 2021-05-05 | End: 2022-03-16

## 2021-05-07 ENCOUNTER — TELEPHONE (OUTPATIENT)
Dept: FAMILY MEDICINE | Facility: CLINIC | Age: 56
End: 2021-05-07

## 2021-05-10 RX ORDER — WARFARIN SODIUM 5 MG/1
TABLET ORAL
Qty: 40 TABLET | Refills: 5 | Status: SHIPPED | OUTPATIENT
Start: 2021-05-10 | End: 2022-04-13

## 2021-05-11 ENCOUNTER — PATIENT MESSAGE (OUTPATIENT)
Dept: TRANSPLANT | Facility: CLINIC | Age: 56
End: 2021-05-11

## 2021-05-13 ENCOUNTER — ANTI-COAG VISIT (OUTPATIENT)
Dept: CARDIOLOGY | Facility: CLINIC | Age: 56
End: 2021-05-13
Payer: COMMERCIAL

## 2021-05-13 ENCOUNTER — LAB VISIT (OUTPATIENT)
Dept: LAB | Facility: HOSPITAL | Age: 56
End: 2021-05-13
Attending: INTERNAL MEDICINE
Payer: COMMERCIAL

## 2021-05-13 DIAGNOSIS — Z95.811 LVAD (LEFT VENTRICULAR ASSIST DEVICE) PRESENT: Primary | ICD-10-CM

## 2021-05-13 DIAGNOSIS — Z79.01 ANTICOAGULATION MONITORING, INR RANGE 2-3: ICD-10-CM

## 2021-05-13 DIAGNOSIS — Z95.811 LVAD (LEFT VENTRICULAR ASSIST DEVICE) PRESENT: Chronic | ICD-10-CM

## 2021-05-13 LAB
INR PPP: 2.6 (ref 0.8–1.2)
PROTHROMBIN TIME: 26.1 SEC (ref 9–12.5)

## 2021-05-13 PROCEDURE — 93793 PR ANTICOAGULANT MGMT FOR PT TAKING WARFARIN: ICD-10-PCS | Mod: S$GLB,,,

## 2021-05-13 PROCEDURE — 93793 ANTICOAG MGMT PT WARFARIN: CPT | Mod: S$GLB,,,

## 2021-05-13 PROCEDURE — 85610 PROTHROMBIN TIME: CPT | Performed by: INTERNAL MEDICINE

## 2021-05-13 PROCEDURE — 36415 COLL VENOUS BLD VENIPUNCTURE: CPT | Performed by: INTERNAL MEDICINE

## 2021-05-17 ENCOUNTER — PATIENT MESSAGE (OUTPATIENT)
Dept: ELECTROPHYSIOLOGY | Facility: CLINIC | Age: 56
End: 2021-05-17

## 2021-05-17 RX ORDER — MEXILETINE HYDROCHLORIDE 200 MG/1
200 CAPSULE ORAL EVERY 8 HOURS
Qty: 270 CAPSULE | Refills: 3 | Status: SHIPPED | OUTPATIENT
Start: 2021-05-17 | End: 2021-11-22

## 2021-05-19 ENCOUNTER — ANTI-COAG VISIT (OUTPATIENT)
Dept: CARDIOLOGY | Facility: CLINIC | Age: 56
End: 2021-05-19
Payer: COMMERCIAL

## 2021-05-19 ENCOUNTER — LAB VISIT (OUTPATIENT)
Dept: LAB | Facility: HOSPITAL | Age: 56
End: 2021-05-19
Attending: INTERNAL MEDICINE
Payer: COMMERCIAL

## 2021-05-19 DIAGNOSIS — Z79.01 ANTICOAGULATION MONITORING, INR RANGE 2-3: ICD-10-CM

## 2021-05-19 DIAGNOSIS — Z95.811 LVAD (LEFT VENTRICULAR ASSIST DEVICE) PRESENT: Primary | ICD-10-CM

## 2021-05-19 DIAGNOSIS — Z95.811 LVAD (LEFT VENTRICULAR ASSIST DEVICE) PRESENT: Chronic | ICD-10-CM

## 2021-05-19 LAB
INR PPP: 3.1 (ref 0.8–1.2)
PROTHROMBIN TIME: 30.8 SEC (ref 9–12.5)

## 2021-05-19 PROCEDURE — 93793 PR ANTICOAGULANT MGMT FOR PT TAKING WARFARIN: ICD-10-PCS | Mod: S$GLB,,,

## 2021-05-19 PROCEDURE — 85610 PROTHROMBIN TIME: CPT | Performed by: INTERNAL MEDICINE

## 2021-05-19 PROCEDURE — 36415 COLL VENOUS BLD VENIPUNCTURE: CPT | Performed by: INTERNAL MEDICINE

## 2021-05-19 PROCEDURE — 93793 ANTICOAG MGMT PT WARFARIN: CPT | Mod: S$GLB,,,

## 2021-05-20 ENCOUNTER — PATIENT MESSAGE (OUTPATIENT)
Dept: TRANSPLANT | Facility: CLINIC | Age: 56
End: 2021-05-20

## 2021-05-25 ENCOUNTER — ANTI-COAG VISIT (OUTPATIENT)
Dept: CARDIOLOGY | Facility: CLINIC | Age: 56
End: 2021-05-25
Payer: COMMERCIAL

## 2021-05-25 ENCOUNTER — LAB VISIT (OUTPATIENT)
Dept: LAB | Facility: HOSPITAL | Age: 56
End: 2021-05-25
Attending: INTERNAL MEDICINE
Payer: COMMERCIAL

## 2021-05-25 DIAGNOSIS — Z95.811 LVAD (LEFT VENTRICULAR ASSIST DEVICE) PRESENT: ICD-10-CM

## 2021-05-25 DIAGNOSIS — Z79.01 ANTICOAGULATION MONITORING, INR RANGE 2-3: ICD-10-CM

## 2021-05-25 DIAGNOSIS — Z95.811 LVAD (LEFT VENTRICULAR ASSIST DEVICE) PRESENT: Primary | ICD-10-CM

## 2021-05-25 LAB
INR PPP: 2.1 (ref 0.8–1.2)
PROTHROMBIN TIME: 21.4 SEC (ref 9–12.5)

## 2021-05-25 PROCEDURE — 93793 ANTICOAG MGMT PT WARFARIN: CPT | Mod: S$GLB,,,

## 2021-05-25 PROCEDURE — 93793 PR ANTICOAGULANT MGMT FOR PT TAKING WARFARIN: ICD-10-PCS | Mod: S$GLB,,,

## 2021-05-25 PROCEDURE — 85610 PROTHROMBIN TIME: CPT | Performed by: INTERNAL MEDICINE

## 2021-05-25 PROCEDURE — 36415 COLL VENOUS BLD VENIPUNCTURE: CPT | Performed by: INTERNAL MEDICINE

## 2021-06-01 ENCOUNTER — LAB VISIT (OUTPATIENT)
Dept: LAB | Facility: HOSPITAL | Age: 56
End: 2021-06-01
Attending: INTERNAL MEDICINE
Payer: COMMERCIAL

## 2021-06-01 ENCOUNTER — ANTI-COAG VISIT (OUTPATIENT)
Dept: CARDIOLOGY | Facility: CLINIC | Age: 56
End: 2021-06-01
Payer: COMMERCIAL

## 2021-06-01 DIAGNOSIS — Z79.01 ANTICOAGULATION MONITORING, INR RANGE 2-3: ICD-10-CM

## 2021-06-01 DIAGNOSIS — Z95.811 LVAD (LEFT VENTRICULAR ASSIST DEVICE) PRESENT: Chronic | ICD-10-CM

## 2021-06-01 DIAGNOSIS — Z95.811 LVAD (LEFT VENTRICULAR ASSIST DEVICE) PRESENT: Primary | ICD-10-CM

## 2021-06-01 LAB
INR PPP: 2.6 (ref 0.8–1.2)
PROTHROMBIN TIME: 26.3 SEC (ref 9–12.5)

## 2021-06-01 PROCEDURE — 85610 PROTHROMBIN TIME: CPT | Performed by: INTERNAL MEDICINE

## 2021-06-01 PROCEDURE — 93793 ANTICOAG MGMT PT WARFARIN: CPT | Mod: S$GLB,,,

## 2021-06-01 PROCEDURE — 93793 PR ANTICOAGULANT MGMT FOR PT TAKING WARFARIN: ICD-10-PCS | Mod: S$GLB,,,

## 2021-06-01 PROCEDURE — 36415 COLL VENOUS BLD VENIPUNCTURE: CPT | Performed by: INTERNAL MEDICINE

## 2021-06-08 ENCOUNTER — LAB VISIT (OUTPATIENT)
Dept: LAB | Facility: HOSPITAL | Age: 56
End: 2021-06-08
Attending: INTERNAL MEDICINE
Payer: COMMERCIAL

## 2021-06-08 ENCOUNTER — ANTI-COAG VISIT (OUTPATIENT)
Dept: CARDIOLOGY | Facility: CLINIC | Age: 56
End: 2021-06-08
Payer: COMMERCIAL

## 2021-06-08 DIAGNOSIS — Z95.811 LVAD (LEFT VENTRICULAR ASSIST DEVICE) PRESENT: Primary | ICD-10-CM

## 2021-06-08 DIAGNOSIS — Z95.811 LVAD (LEFT VENTRICULAR ASSIST DEVICE) PRESENT: Chronic | ICD-10-CM

## 2021-06-08 DIAGNOSIS — Z79.01 ANTICOAGULATION MONITORING, INR RANGE 2-3: ICD-10-CM

## 2021-06-08 LAB
INR PPP: 3 (ref 0.8–1.2)
PROTHROMBIN TIME: 29.8 SEC (ref 9–12.5)

## 2021-06-08 PROCEDURE — 36415 COLL VENOUS BLD VENIPUNCTURE: CPT | Performed by: INTERNAL MEDICINE

## 2021-06-08 PROCEDURE — 93793 PR ANTICOAGULANT MGMT FOR PT TAKING WARFARIN: ICD-10-PCS | Mod: S$GLB,,,

## 2021-06-08 PROCEDURE — 85610 PROTHROMBIN TIME: CPT | Performed by: INTERNAL MEDICINE

## 2021-06-08 PROCEDURE — 93793 ANTICOAG MGMT PT WARFARIN: CPT | Mod: S$GLB,,,

## 2021-06-10 NOTE — PROGRESS NOTES
[facility]  Critical Care - Surgery  Progress Note    Patient Name: Tim Richards  MRN: [id number]  Admission Date: 3/1/2018  Hospital Length of Stay: 11 days  Code Status: Full Code  Attending Provider: Yg Kaufman MD  Primary Care Provider: Ja Méndez MD   Principal Problem: Acute decompensated heart failure    Subjective:     Hospital/ICU Course:  3/8 - LVAD placement  3/9 - revision of outflow graft, chest remains open  3/10 - chest closure  3/11 - pt extubated, PTX noted    Interval History/Significant Events: Overnight 18 beat run VT.  Remains extubated.  Pain controlled with PO oxycodone.  On 5L NC satting well.  Nitric oxide set on 10ppm.  Lasix @5mg/hr and diuresing, amiodarone @ 0.5mg/min.  Dobutamine @5.  Epi @0.06.  Heparin @1100U/hr.  Chest tube output 50mL overnight.    Follow-up For: Procedure(s) (LRB):  CLOSURE-STERNAL WOUND (N/A)  PLACEMENT-NEOPERICARDIUM    Post-Operative Day: 2 Days Post-Op    Objective:     Vital Signs (Most Recent):  Temp: 100 °F (37.8 °C) (03/12/18 0300)  Pulse: 86 (03/12/18 0500)  Resp: (!) 25 (03/12/18 0500)  BP: (!) 90/0 (03/12/18 0300)  SpO2: 100 % (03/12/18 0500) Vital Signs (24h Range):  Temp:  [99.8 °F (37.7 °C)-101.3 °F (38.5 °C)] 100 °F (37.8 °C)  Pulse:  [] 86  Resp:  [3-40] 25  SpO2:  [94 %-100 %] 100 %  BP: (82-90)/(0-54) 90/0  Arterial Line BP: (75-93)/(60-72) 90/69     Weight: 119.7 kg (263 lb 14.3 oz)  Body mass index is 34.82 kg/m².      Intake/Output Summary (Last 24 hours) at 03/12/18 0612  Last data filed at 03/12/18 0530   Gross per 24 hour   Intake           3352.1 ml   Output             4307 ml   Net           -954.9 ml       Physical Exam   Constitutional: He is oriented to person, place, and time. He appears well-developed and well-nourished.   HENT:   Head: Normocephalic and atraumatic.   Eyes: EOM are normal. Pupils are equal, round, and reactive to light.   Neck: Neck supple.   Cardiovascular:   Mechanical hum    Pulmonary/Chest: Breath sounds normal. No respiratory distress. He has no wheezes.   Mediastinal chest tubes x2 in place. SS output   Abdominal: Soft. He exhibits no distension. There is no tenderness.   Genitourinary:   Genitourinary Comments: Lagunas in place   Neurological: He is alert and oriented to person, place, and time.   Skin: Skin is warm and dry.       Vents:  Vent Mode: SIMV (03/11/18 0740)  Ventilator Initiated: Yes (03/08/18 1400)  Set Rate: 18 bmp (03/11/18 0740)  Vt Set: 550 mL (03/11/18 0740)  Pressure Support: 10 cmH20 (03/11/18 0740)  PEEP/CPAP: 5 cmH20 (03/11/18 0740)  Oxygen Concentration (%): 50 (03/11/18 0930)  Peak Airway Pressure: 26 cmH2O (03/11/18 0740)  Plateau Pressure: 0 cmH20 (03/11/18 0740)  Total Ve: 10.6 mL (03/11/18 0740)  F/VT Ratio<105 (RSBI): (!) 36.52 (03/11/18 0523)    Lines/Drains/Airways     Central Venous Catheter Line                 Introducer 03/08/18 0800 3 days         Percutaneous Central Line Insertion/Assessment - triple lumen  03/08/18 0726 right internal jugular 3 days         Pulmonary Artery Catheter Assessment  03/08/18 0726 3 days          Drain                 Chest Tube 03/08/18 1125 1 Right Mediastinal 32 Fr. 3 days         Chest Tube 03/08/18 1125 2 Left Mediastinal 32 Fr. 3 days         Urethral Catheter 03/08/18 0736 Temperature probe;Straight-tip;Non-latex 16 Fr. 3 days          Arterial Line                 Arterial Line 03/08/18 0721 Left Radial 3 days          Line                 VAD 03/08/18 1124 Left ventricular assist device HeartMate II 3 days          Peripheral Intravenous Line                 Midline Catheter Insertion/Assessment  - Single Lumen 03/07/18 1130 Right cephalic vein (lateral side of arm) 18g x 8cm 4 days         Peripheral IV - Single Lumen 03/08/18 0739 Left Forearm 3 days                Significant Labs:    CBC/Anemia Profile:    Recent Labs  Lab 03/11/18  0422 03/11/18  0733 03/12/18  0506   WBC 14.82*  14.82* 14.09* 13.55*    HGB 7.7*  7.7* 7.3* 6.7*   HCT 22.5*  22.5* 22.6* 20.5*   *  133* 127* 140*   MCV 83  83 82 82   RDW 15.2*  15.2* 15.2* 15.4*        Chemistries:    Recent Labs  Lab 03/11/18  0422 03/11/18  0733  03/11/18  1720 03/12/18  0001 03/12/18  0506     138  138 138  --   --   --  139   K 3.6  3.6  3.6 3.4*  < > 3.6 3.4* 3.9  3.9   CL 99  99  99 99  --   --   --  100   CO2 27  27  27 26  --   --   --  27   BUN 37*  37*  37* 39*  --   --   --  42*   CREATININE 2.6*  2.6*  2.6* 2.7*  --   --   --  2.2*   CALCIUM 9.5  9.5  9.5 9.4  --   --   --  9.4   ALBUMIN 3.0*  --   --   --   --  2.7*   PROT 6.4  --   --   --   --  6.3   BILITOT 2.4*  --   --   --   --  3.3*   ALKPHOS 63  --   --   --   --  79   ALT 16  --   --   --   --  21   AST 44*  --   --   --   --  55*   MG 2.3 2.3  < > 2.4 2.4 2.3   PHOS 4.5 4.3  --   --   --  3.2   < > = values in this interval not displayed.    All pertinent labs within the past 24 hours have been reviewed.    Significant Imaging:  I have reviewed and interpreted all pertinent imaging results/findings within the past 24 hours.    Assessment/Plan:     Chronic systolic heart failure    Neuro:   -off sedation  -pain controlled with oxycodone     Pulmonary:   - extubated 3/11  - Scheduled duo-nebs  - Nitric. Wean per CTS  - R side PTX first noted after extubation 3/11, increasing in size, plan for chest tube 3/12     Cardiac:  - s/p LVAD 3/8/18, outflow tract revision on 3/9, and chest closure 3/10  - Pressors: Epi, dobutamine  - Wean pressors per CTS  - Amiodarone drip for arrhythmias     Renal:   - CKD; baseline Cr. 1.7  - WAGNER on 3/8 and 3/11, improving  - Lagunas in place  - Lasix drip  - Monitor UOP     Infectious Disease:   -Leukocytosis on 3/8 after initial surgery, improved, stable at 14, trend WBC  -Low grade fevers on 3/8 after initial surgery, torsten to >38C on 3/10  -Blood cultures 3/6 NG, 3/11 NGTD  -vancomycin and doxycycline on 3/8  -cefazolin for surgery  3/9 and 3/10  -fluconazole, cefepime, bacitracin IM started 3/8, continuing     Hematology/Oncology:  - Trend CBC, H/H slowly trending down, transfuse per CTS  - iron supplmentation  - On heparin drip, started warfarin 3/12     Endocrine:  - Insuline gtt  - Endocrine on board     Fluids/Electrolytes/Nutrition/GI:   - tolerating PO intake, advance as tolerated  - Trend CMP  - Replace Lytes PRN      Dispo:  Continue SICU care. CTS primary.           Critical care was time spent personally by me on the following activities: development of treatment plan with patient or surrogate and bedside caregivers, discussions with consultants, evaluation of patient's response to treatment, examination of patient, ordering and performing treatments and interventions, ordering and review of laboratory studies, ordering and review of radiographic studies, pulse oximetry, re-evaluation of patient's condition.  This critical care time did not overlap with that of any other provider or involve time for any procedures.     Fam Patel MD  Critical Care - Surgery  [facility]     Prophylactic measure

## 2021-06-14 ENCOUNTER — CLINICAL SUPPORT (OUTPATIENT)
Dept: CARDIOLOGY | Facility: HOSPITAL | Age: 56
End: 2021-06-14
Attending: INTERNAL MEDICINE
Payer: COMMERCIAL

## 2021-06-14 ENCOUNTER — OFFICE VISIT (OUTPATIENT)
Dept: PSYCHIATRY | Facility: CLINIC | Age: 56
End: 2021-06-14
Payer: COMMERCIAL

## 2021-06-14 ENCOUNTER — ANTI-COAG VISIT (OUTPATIENT)
Dept: CARDIOLOGY | Facility: CLINIC | Age: 56
End: 2021-06-14
Payer: COMMERCIAL

## 2021-06-14 DIAGNOSIS — I50.42 CHRONIC COMBINED SYSTOLIC (CONGESTIVE) AND DIASTOLIC (CONGESTIVE) HEART FAILURE: ICD-10-CM

## 2021-06-14 DIAGNOSIS — F43.23 ADJUSTMENT DISORDER WITH MIXED ANXIETY AND DEPRESSED MOOD: ICD-10-CM

## 2021-06-14 DIAGNOSIS — Z95.810 PRESENCE OF AUTOMATIC (IMPLANTABLE) CARDIAC DEFIBRILLATOR: ICD-10-CM

## 2021-06-14 DIAGNOSIS — G47.00 INSOMNIA, UNSPECIFIED TYPE: ICD-10-CM

## 2021-06-14 DIAGNOSIS — Z79.01 ANTICOAGULATION MONITORING, INR RANGE 2-3: ICD-10-CM

## 2021-06-14 DIAGNOSIS — F41.1 GENERALIZED ANXIETY DISORDER: Primary | ICD-10-CM

## 2021-06-14 DIAGNOSIS — Z95.811 LVAD (LEFT VENTRICULAR ASSIST DEVICE) PRESENT: Primary | ICD-10-CM

## 2021-06-14 PROCEDURE — 93296 REM INTERROG EVL PM/IDS: CPT | Performed by: INTERNAL MEDICINE

## 2021-06-14 PROCEDURE — 93295 DEV INTERROG REMOTE 1/2/MLT: CPT | Mod: ,,, | Performed by: INTERNAL MEDICINE

## 2021-06-14 PROCEDURE — 93793 PR ANTICOAGULANT MGMT FOR PT TAKING WARFARIN: ICD-10-PCS | Mod: S$GLB,,,

## 2021-06-14 PROCEDURE — 99213 PR OFFICE/OUTPT VISIT, EST, LEVL III, 20-29 MIN: ICD-10-PCS | Mod: 95,,, | Performed by: PSYCHIATRY & NEUROLOGY

## 2021-06-14 PROCEDURE — 93295 CARDIAC DEVICE CHECK - REMOTE: ICD-10-PCS | Mod: ,,, | Performed by: INTERNAL MEDICINE

## 2021-06-14 PROCEDURE — 93793 ANTICOAG MGMT PT WARFARIN: CPT | Mod: S$GLB,,,

## 2021-06-14 PROCEDURE — 99213 OFFICE O/P EST LOW 20 MIN: CPT | Mod: 95,,, | Performed by: PSYCHIATRY & NEUROLOGY

## 2021-06-14 RX ORDER — ALPRAZOLAM 1 MG/1
1 TABLET ORAL DAILY PRN
Qty: 30 TABLET | Refills: 5 | Status: SHIPPED | OUTPATIENT
Start: 2021-06-14 | End: 2021-07-19 | Stop reason: SDUPTHER

## 2021-06-14 RX ORDER — ZOLPIDEM TARTRATE 12.5 MG/1
12.5 TABLET, FILM COATED, EXTENDED RELEASE ORAL NIGHTLY PRN
Qty: 30 TABLET | Refills: 5 | Status: SHIPPED | OUTPATIENT
Start: 2021-06-14 | End: 2021-12-08 | Stop reason: SDUPTHER

## 2021-06-15 ENCOUNTER — TELEPHONE (OUTPATIENT)
Dept: CARDIOLOGY | Facility: HOSPITAL | Age: 56
End: 2021-06-15

## 2021-06-23 ENCOUNTER — PATIENT MESSAGE (OUTPATIENT)
Dept: CARDIOLOGY | Facility: CLINIC | Age: 56
End: 2021-06-23

## 2021-06-24 ENCOUNTER — TELEPHONE (OUTPATIENT)
Dept: CARDIOLOGY | Facility: HOSPITAL | Age: 56
End: 2021-06-24

## 2021-06-24 DIAGNOSIS — I49.8 OTHER SPECIFIED CARDIAC ARRHYTHMIAS: Primary | ICD-10-CM

## 2021-06-28 ENCOUNTER — PATIENT MESSAGE (OUTPATIENT)
Dept: TRANSPLANT | Facility: CLINIC | Age: 56
End: 2021-06-28

## 2021-06-29 ENCOUNTER — ANTI-COAG VISIT (OUTPATIENT)
Dept: CARDIOLOGY | Facility: CLINIC | Age: 56
End: 2021-06-29
Payer: COMMERCIAL

## 2021-06-29 ENCOUNTER — LAB VISIT (OUTPATIENT)
Dept: LAB | Facility: HOSPITAL | Age: 56
End: 2021-06-29
Attending: INTERNAL MEDICINE
Payer: COMMERCIAL

## 2021-06-29 ENCOUNTER — PATIENT MESSAGE (OUTPATIENT)
Dept: TRANSPLANT | Facility: CLINIC | Age: 56
End: 2021-06-29

## 2021-06-29 DIAGNOSIS — Z95.811 LVAD (LEFT VENTRICULAR ASSIST DEVICE) PRESENT: Chronic | ICD-10-CM

## 2021-06-29 DIAGNOSIS — Z79.01 ANTICOAGULATION MONITORING, INR RANGE 2-3: ICD-10-CM

## 2021-06-29 DIAGNOSIS — Z95.811 LVAD (LEFT VENTRICULAR ASSIST DEVICE) PRESENT: Primary | ICD-10-CM

## 2021-06-29 LAB
INR PPP: 3.2 (ref 0.8–1.2)
PROTHROMBIN TIME: 31.5 SEC (ref 9–12.5)

## 2021-06-29 PROCEDURE — 36415 COLL VENOUS BLD VENIPUNCTURE: CPT | Performed by: INTERNAL MEDICINE

## 2021-06-29 PROCEDURE — 85610 PROTHROMBIN TIME: CPT | Performed by: INTERNAL MEDICINE

## 2021-06-29 PROCEDURE — 93793 PR ANTICOAGULANT MGMT FOR PT TAKING WARFARIN: ICD-10-PCS | Mod: S$GLB,,,

## 2021-06-29 PROCEDURE — 93793 ANTICOAG MGMT PT WARFARIN: CPT | Mod: S$GLB,,,

## 2021-06-30 ENCOUNTER — PATIENT MESSAGE (OUTPATIENT)
Dept: TRANSPLANT | Facility: CLINIC | Age: 56
End: 2021-06-30

## 2021-07-01 ENCOUNTER — PATIENT MESSAGE (OUTPATIENT)
Dept: TRANSPLANT | Facility: CLINIC | Age: 56
End: 2021-07-01

## 2021-07-06 ENCOUNTER — TELEPHONE (OUTPATIENT)
Dept: TRANSPLANT | Facility: CLINIC | Age: 56
End: 2021-07-06

## 2021-07-07 ENCOUNTER — TELEPHONE (OUTPATIENT)
Dept: TRANSPLANT | Facility: CLINIC | Age: 56
End: 2021-07-07

## 2021-07-07 ENCOUNTER — CLINICAL SUPPORT (OUTPATIENT)
Dept: TRANSPLANT | Facility: CLINIC | Age: 56
End: 2021-07-07
Payer: COMMERCIAL

## 2021-07-07 ENCOUNTER — ANTI-COAG VISIT (OUTPATIENT)
Dept: CARDIOLOGY | Facility: CLINIC | Age: 56
End: 2021-07-07
Payer: COMMERCIAL

## 2021-07-07 ENCOUNTER — OFFICE VISIT (OUTPATIENT)
Dept: TRANSPLANT | Facility: CLINIC | Age: 56
End: 2021-07-07
Payer: COMMERCIAL

## 2021-07-07 ENCOUNTER — HOSPITAL ENCOUNTER (OUTPATIENT)
Dept: PULMONOLOGY | Facility: CLINIC | Age: 56
Discharge: HOME | End: 2021-07-07
Payer: COMMERCIAL

## 2021-07-07 VITALS — BODY MASS INDEX: 36.45 KG/M2 | TEMPERATURE: 98 F | SYSTOLIC BLOOD PRESSURE: 72 MMHG | HEIGHT: 73 IN | WEIGHT: 275 LBS

## 2021-07-07 DIAGNOSIS — Z95.811 LVAD (LEFT VENTRICULAR ASSIST DEVICE) PRESENT: Primary | ICD-10-CM

## 2021-07-07 DIAGNOSIS — E66.01 SEVERE OBESITY (BMI 35.0-39.9) WITH COMORBIDITY: ICD-10-CM

## 2021-07-07 DIAGNOSIS — I50.42 CHRONIC COMBINED SYSTOLIC AND DIASTOLIC CONGESTIVE HEART FAILURE: ICD-10-CM

## 2021-07-07 DIAGNOSIS — I50.42 CHRONIC COMBINED SYSTOLIC AND DIASTOLIC HEART FAILURE: ICD-10-CM

## 2021-07-07 DIAGNOSIS — I10 ESSENTIAL HYPERTENSION: Chronic | ICD-10-CM

## 2021-07-07 DIAGNOSIS — Z79.01 ANTICOAGULATION MONITORING, INR RANGE 2-3: ICD-10-CM

## 2021-07-07 DIAGNOSIS — I47.20 VT (VENTRICULAR TACHYCARDIA): Chronic | ICD-10-CM

## 2021-07-07 DIAGNOSIS — Z95.811 LVAD (LEFT VENTRICULAR ASSIST DEVICE) PRESENT: Primary | Chronic | ICD-10-CM

## 2021-07-07 DIAGNOSIS — R06.02 SOB (SHORTNESS OF BREATH): Primary | ICD-10-CM

## 2021-07-07 LAB
DLCO ADJ PRE: 21.64 ML/(MIN*MMHG) (ref 25.13–38.99)
DLCO SINGLE BREATH LLN: 25.13
DLCO SINGLE BREATH PRE REF: 65.9 %
DLCO SINGLE BREATH REF: 32.06
DLCOC SBVA LLN: 3.12
DLCOC SBVA PRE REF: 93.6 %
DLCOC SBVA REF: 4.25
DLCOC SINGLE BREATH LLN: 25.13
DLCOC SINGLE BREATH PRE REF: 67.5 %
DLCOC SINGLE BREATH REF: 32.06
DLCOCSBVAULN: 5.38
DLCOCSINGLEBREATHULN: 38.99
DLCOSINGLEBREATHULN: 38.99
DLCOVA LLN: 3.12
DLCOVA PRE REF: 91.4 %
DLCOVA PRE: 3.89 ML/(MIN*MMHG*L) (ref 3.12–5.38)
DLCOVA REF: 4.25
DLCOVAULN: 5.38
DLVAADJ PRE: 3.98 ML/(MIN*MMHG*L) (ref 3.12–5.38)
FEF 25 75 LLN: 1.37
FEF 25 75 PRE REF: 39.2 %
FEF 25 75 REF: 3.03
FEV05 LLN: 1.97
FEV05 REF: 3.11
FEV1 FVC LLN: 68
FEV1 FVC PRE REF: 87.2 %
FEV1 FVC REF: 79
FEV1 LLN: 2.55
FEV1 PRE REF: 58.3 %
FEV1 REF: 3.44
FVC LLN: 3.31
FVC PRE REF: 66.6 %
FVC REF: 4.38
IVC PRE: 3.14 L (ref 3.31–5.46)
IVC SINGLE BREATH LLN: 3.31
IVC SINGLE BREATH PRE REF: 71.7 %
IVC SINGLE BREATH REF: 4.38
IVCSINGLEBREATHULN: 5.46
PEF LLN: 6.36
PEF PRE REF: 85.3 %
PEF REF: 9.17
PHYSICIAN COMMENT: ABNORMAL
PRE DLCO: 21.14 ML/(MIN*MMHG) (ref 25.13–38.99)
PRE FEF 25 75: 1.19 L/S (ref 1.37–4.7)
PRE FET 100: 5.69 SEC
PRE FEV05 REF: 49.6 %
PRE FEV1 FVC: 68.58 % (ref 67.69–89.55)
PRE FEV1: 2 L (ref 2.55–4.33)
PRE FEV5: 1.54 L (ref 1.97–4.24)
PRE FVC: 2.92 L (ref 3.31–5.46)
PRE PEF: 7.83 L/S (ref 6.36–11.99)
VA PRE: 5.44 L (ref 7.39–7.39)
VA SINGLE BREATH LLN: 7.39
VA SINGLE BREATH PRE REF: 73.6 %
VA SINGLE BREATH REF: 7.39
VASINGLEBREATHULN: 7.39

## 2021-07-07 PROCEDURE — 94729 PR C02/MEMBANE DIFFUSE CAPACITY: ICD-10-PCS | Mod: S$GLB,,, | Performed by: INTERNAL MEDICINE

## 2021-07-07 PROCEDURE — 99214 PR OFFICE/OUTPT VISIT, EST, LEVL IV, 30-39 MIN: ICD-10-PCS | Mod: S$GLB,,, | Performed by: INTERNAL MEDICINE

## 2021-07-07 PROCEDURE — 94729 DIFFUSING CAPACITY: CPT | Mod: S$GLB,,, | Performed by: INTERNAL MEDICINE

## 2021-07-07 PROCEDURE — 93750 INTERROGATION VAD IN PERSON: CPT | Mod: S$GLB,,, | Performed by: INTERNAL MEDICINE

## 2021-07-07 PROCEDURE — 99999 PR PBB SHADOW E&M-EST. PATIENT-LVL IV: CPT | Mod: PBBFAC,,, | Performed by: INTERNAL MEDICINE

## 2021-07-07 PROCEDURE — 93750 OP LVAD INTERROGATION: ICD-10-PCS | Mod: S$GLB,,, | Performed by: INTERNAL MEDICINE

## 2021-07-07 PROCEDURE — 94010 BREATHING CAPACITY TEST: CPT | Mod: S$GLB,,, | Performed by: INTERNAL MEDICINE

## 2021-07-07 PROCEDURE — 99999 PR PBB SHADOW E&M-EST. PATIENT-LVL IV: ICD-10-PCS | Mod: PBBFAC,,, | Performed by: INTERNAL MEDICINE

## 2021-07-07 PROCEDURE — 94010 BREATHING CAPACITY TEST: ICD-10-PCS | Mod: S$GLB,,, | Performed by: INTERNAL MEDICINE

## 2021-07-07 PROCEDURE — 99214 OFFICE O/P EST MOD 30 MIN: CPT | Mod: S$GLB,,, | Performed by: INTERNAL MEDICINE

## 2021-07-07 RX ORDER — GUANFACINE 2 MG/1
2 TABLET ORAL NIGHTLY
Qty: 30 TABLET | Refills: 11 | Status: CANCELLED | OUTPATIENT
Start: 2021-07-07 | End: 2022-07-07

## 2021-07-08 ENCOUNTER — TELEPHONE (OUTPATIENT)
Dept: TRANSPLANT | Facility: CLINIC | Age: 56
End: 2021-07-08

## 2021-07-08 DIAGNOSIS — E87.70 HYPERVOLEMIA, UNSPECIFIED HYPERVOLEMIA TYPE: ICD-10-CM

## 2021-07-08 DIAGNOSIS — Z76.82 ORGAN TRANSPLANT CANDIDATE: ICD-10-CM

## 2021-07-08 DIAGNOSIS — I50.9 HEART FAILURE, UNSPECIFIED HF CHRONICITY, UNSPECIFIED HEART FAILURE TYPE: ICD-10-CM

## 2021-07-14 ENCOUNTER — LAB VISIT (OUTPATIENT)
Dept: LAB | Facility: HOSPITAL | Age: 56
End: 2021-07-14
Attending: INTERNAL MEDICINE
Payer: COMMERCIAL

## 2021-07-14 ENCOUNTER — ANTI-COAG VISIT (OUTPATIENT)
Dept: CARDIOLOGY | Facility: CLINIC | Age: 56
End: 2021-07-14

## 2021-07-14 DIAGNOSIS — Z79.01 ANTICOAGULATION MONITORING, INR RANGE 2-3: ICD-10-CM

## 2021-07-14 DIAGNOSIS — Z95.811 LVAD (LEFT VENTRICULAR ASSIST DEVICE) PRESENT: Primary | ICD-10-CM

## 2021-07-14 DIAGNOSIS — Z95.811 LVAD (LEFT VENTRICULAR ASSIST DEVICE) PRESENT: Chronic | ICD-10-CM

## 2021-07-14 LAB
INR PPP: 3.5 (ref 0.8–1.2)
PROTHROMBIN TIME: 34.2 SEC (ref 9–12.5)

## 2021-07-14 PROCEDURE — 36415 COLL VENOUS BLD VENIPUNCTURE: CPT | Mod: NTX | Performed by: INTERNAL MEDICINE

## 2021-07-14 PROCEDURE — 85610 PROTHROMBIN TIME: CPT | Mod: NTX | Performed by: INTERNAL MEDICINE

## 2021-07-19 ENCOUNTER — ANTI-COAG VISIT (OUTPATIENT)
Dept: CARDIOLOGY | Facility: CLINIC | Age: 56
End: 2021-07-19
Payer: COMMERCIAL

## 2021-07-19 ENCOUNTER — LAB VISIT (OUTPATIENT)
Dept: LAB | Facility: HOSPITAL | Age: 56
End: 2021-07-19
Attending: INTERNAL MEDICINE
Payer: COMMERCIAL

## 2021-07-19 DIAGNOSIS — Z79.01 ANTICOAGULATION MONITORING, INR RANGE 2-3: ICD-10-CM

## 2021-07-19 DIAGNOSIS — Z95.811 LVAD (LEFT VENTRICULAR ASSIST DEVICE) PRESENT: Primary | ICD-10-CM

## 2021-07-19 DIAGNOSIS — Z95.811 LVAD (LEFT VENTRICULAR ASSIST DEVICE) PRESENT: Chronic | ICD-10-CM

## 2021-07-19 LAB
INR PPP: 3 (ref 0.8–1.2)
PROTHROMBIN TIME: 29.5 SEC (ref 9–12.5)

## 2021-07-19 PROCEDURE — 36415 COLL VENOUS BLD VENIPUNCTURE: CPT | Mod: NTX | Performed by: INTERNAL MEDICINE

## 2021-07-19 PROCEDURE — 93793 PR ANTICOAGULANT MGMT FOR PT TAKING WARFARIN: ICD-10-PCS | Mod: S$GLB,,,

## 2021-07-19 PROCEDURE — 85610 PROTHROMBIN TIME: CPT | Mod: TXP | Performed by: INTERNAL MEDICINE

## 2021-07-19 PROCEDURE — 93793 ANTICOAG MGMT PT WARFARIN: CPT | Mod: S$GLB,,,

## 2021-07-23 DIAGNOSIS — Z95.811 LVAD (LEFT VENTRICULAR ASSIST DEVICE) PRESENT: ICD-10-CM

## 2021-07-23 DIAGNOSIS — E79.0 HYPERURICEMIA: ICD-10-CM

## 2021-07-23 DIAGNOSIS — I50.42 CHRONIC COMBINED SYSTOLIC AND DIASTOLIC HEART FAILURE: ICD-10-CM

## 2021-07-23 RX ORDER — ALLOPURINOL 100 MG/1
TABLET ORAL
Qty: 30 TABLET | Refills: 5 | Status: SHIPPED | OUTPATIENT
Start: 2021-07-23 | End: 2022-01-11

## 2021-07-23 RX ORDER — POTASSIUM CHLORIDE 20 MEQ/1
20 TABLET, EXTENDED RELEASE ORAL 2 TIMES DAILY
Qty: 30 TABLET | Refills: 11 | Status: SHIPPED | OUTPATIENT
Start: 2021-07-23 | End: 2022-01-11

## 2021-07-23 RX ORDER — GUANFACINE 2 MG/1
2 TABLET ORAL NIGHTLY
Qty: 30 TABLET | Refills: 11 | Status: SHIPPED | OUTPATIENT
Start: 2021-07-23 | End: 2022-01-19

## 2021-07-23 RX ORDER — TORSEMIDE 20 MG/1
40 TABLET ORAL DAILY
Qty: 180 TABLET | Refills: 3 | Status: SHIPPED | OUTPATIENT
Start: 2021-07-23 | End: 2022-03-17 | Stop reason: SDUPTHER

## 2021-07-23 RX ORDER — SILDENAFIL 100 MG/1
100 TABLET, FILM COATED ORAL DAILY PRN
Qty: 6 TABLET | Refills: 3 | Status: SHIPPED | OUTPATIENT
Start: 2021-07-23 | End: 2022-02-16 | Stop reason: SDUPTHER

## 2021-07-26 ENCOUNTER — LAB VISIT (OUTPATIENT)
Dept: LAB | Facility: HOSPITAL | Age: 56
End: 2021-07-26
Attending: INTERNAL MEDICINE
Payer: COMMERCIAL

## 2021-07-26 ENCOUNTER — ANTI-COAG VISIT (OUTPATIENT)
Dept: CARDIOLOGY | Facility: CLINIC | Age: 56
End: 2021-07-26
Payer: COMMERCIAL

## 2021-07-26 ENCOUNTER — PATIENT MESSAGE (OUTPATIENT)
Dept: TRANSPLANT | Facility: CLINIC | Age: 56
End: 2021-07-26

## 2021-07-26 DIAGNOSIS — Z95.811 LVAD (LEFT VENTRICULAR ASSIST DEVICE) PRESENT: Primary | ICD-10-CM

## 2021-07-26 DIAGNOSIS — Z79.01 ANTICOAGULATION MONITORING, INR RANGE 2-3: ICD-10-CM

## 2021-07-26 DIAGNOSIS — Z95.811 LVAD (LEFT VENTRICULAR ASSIST DEVICE) PRESENT: Chronic | ICD-10-CM

## 2021-07-26 LAB
INR PPP: 2.3 (ref 0.8–1.2)
PROTHROMBIN TIME: 23.3 SEC (ref 9–12.5)

## 2021-07-26 PROCEDURE — 93793 ANTICOAG MGMT PT WARFARIN: CPT | Mod: S$GLB,,,

## 2021-07-26 PROCEDURE — 85610 PROTHROMBIN TIME: CPT | Mod: TXP | Performed by: INTERNAL MEDICINE

## 2021-07-26 PROCEDURE — 93793 PR ANTICOAGULANT MGMT FOR PT TAKING WARFARIN: ICD-10-PCS | Mod: S$GLB,,,

## 2021-07-26 PROCEDURE — 36415 COLL VENOUS BLD VENIPUNCTURE: CPT | Mod: NTX | Performed by: INTERNAL MEDICINE

## 2021-07-27 ENCOUNTER — PATIENT MESSAGE (OUTPATIENT)
Dept: CARDIOLOGY | Facility: CLINIC | Age: 56
End: 2021-07-27

## 2021-07-28 ENCOUNTER — PATIENT MESSAGE (OUTPATIENT)
Dept: CARDIOTHORACIC SURGERY | Facility: CLINIC | Age: 56
End: 2021-07-28

## 2021-07-28 DIAGNOSIS — Z95.811 LVAD (LEFT VENTRICULAR ASSIST DEVICE) PRESENT: Primary | ICD-10-CM

## 2021-07-29 ENCOUNTER — OFFICE VISIT (OUTPATIENT)
Dept: PSYCHIATRY | Facility: CLINIC | Age: 56
End: 2021-07-29
Payer: COMMERCIAL

## 2021-07-29 DIAGNOSIS — F43.23 ADJUSTMENT DISORDER WITH MIXED ANXIETY AND DEPRESSED MOOD: ICD-10-CM

## 2021-07-29 DIAGNOSIS — F41.1 GENERALIZED ANXIETY DISORDER: Primary | ICD-10-CM

## 2021-07-29 DIAGNOSIS — G47.00 INSOMNIA, UNSPECIFIED TYPE: ICD-10-CM

## 2021-07-29 PROCEDURE — 99213 PR OFFICE/OUTPT VISIT, EST, LEVL III, 20-29 MIN: ICD-10-PCS | Mod: 95,NTX,, | Performed by: PSYCHIATRY & NEUROLOGY

## 2021-07-29 PROCEDURE — 99213 OFFICE O/P EST LOW 20 MIN: CPT | Mod: 95,NTX,, | Performed by: PSYCHIATRY & NEUROLOGY

## 2021-08-02 ENCOUNTER — TELEPHONE (OUTPATIENT)
Dept: TRANSPLANT | Facility: CLINIC | Age: 56
End: 2021-08-02

## 2021-08-03 ENCOUNTER — LAB VISIT (OUTPATIENT)
Dept: LAB | Facility: HOSPITAL | Age: 56
End: 2021-08-03
Attending: INTERNAL MEDICINE
Payer: COMMERCIAL

## 2021-08-03 ENCOUNTER — ANTI-COAG VISIT (OUTPATIENT)
Dept: CARDIOLOGY | Facility: CLINIC | Age: 56
End: 2021-08-03
Payer: COMMERCIAL

## 2021-08-03 DIAGNOSIS — Z79.01 ANTICOAGULATION MONITORING, INR RANGE 2-3: ICD-10-CM

## 2021-08-03 DIAGNOSIS — Z95.811 LVAD (LEFT VENTRICULAR ASSIST DEVICE) PRESENT: ICD-10-CM

## 2021-08-03 DIAGNOSIS — Z95.811 LVAD (LEFT VENTRICULAR ASSIST DEVICE) PRESENT: Primary | ICD-10-CM

## 2021-08-03 LAB
INR PPP: 3.1 (ref 0.8–1.2)
PROTHROMBIN TIME: 30.6 SEC (ref 9–12.5)

## 2021-08-03 PROCEDURE — 85610 PROTHROMBIN TIME: CPT | Mod: NTX | Performed by: INTERNAL MEDICINE

## 2021-08-03 PROCEDURE — 93793 ANTICOAG MGMT PT WARFARIN: CPT | Mod: S$GLB,,,

## 2021-08-03 PROCEDURE — 36415 COLL VENOUS BLD VENIPUNCTURE: CPT | Mod: NTX | Performed by: INTERNAL MEDICINE

## 2021-08-03 PROCEDURE — 93793 PR ANTICOAGULANT MGMT FOR PT TAKING WARFARIN: ICD-10-PCS | Mod: S$GLB,,,

## 2021-08-05 ENCOUNTER — LAB VISIT (OUTPATIENT)
Dept: LAB | Facility: HOSPITAL | Age: 56
End: 2021-08-05
Attending: INTERNAL MEDICINE
Payer: COMMERCIAL

## 2021-08-05 ENCOUNTER — ANTI-COAG VISIT (OUTPATIENT)
Dept: CARDIOLOGY | Facility: CLINIC | Age: 56
End: 2021-08-05
Payer: COMMERCIAL

## 2021-08-05 DIAGNOSIS — Z95.811 LVAD (LEFT VENTRICULAR ASSIST DEVICE) PRESENT: Primary | ICD-10-CM

## 2021-08-05 DIAGNOSIS — Z95.811 LVAD (LEFT VENTRICULAR ASSIST DEVICE) PRESENT: Chronic | ICD-10-CM

## 2021-08-05 DIAGNOSIS — Z79.01 ANTICOAGULATION MONITORING, INR RANGE 2-3: ICD-10-CM

## 2021-08-05 LAB
INR PPP: 2.5 (ref 0.8–1.2)
PROTHROMBIN TIME: 25.4 SEC (ref 9–12.5)

## 2021-08-05 PROCEDURE — 85610 PROTHROMBIN TIME: CPT | Mod: NTX | Performed by: INTERNAL MEDICINE

## 2021-08-05 PROCEDURE — 93793 ANTICOAG MGMT PT WARFARIN: CPT | Mod: S$GLB,,,

## 2021-08-05 PROCEDURE — 93793 PR ANTICOAGULANT MGMT FOR PT TAKING WARFARIN: ICD-10-PCS | Mod: S$GLB,,,

## 2021-08-05 PROCEDURE — 36415 COLL VENOUS BLD VENIPUNCTURE: CPT | Mod: NTX | Performed by: INTERNAL MEDICINE

## 2021-08-18 ENCOUNTER — ANTI-COAG VISIT (OUTPATIENT)
Dept: CARDIOLOGY | Facility: CLINIC | Age: 56
End: 2021-08-18
Payer: COMMERCIAL

## 2021-08-18 ENCOUNTER — LAB VISIT (OUTPATIENT)
Dept: LAB | Facility: HOSPITAL | Age: 56
End: 2021-08-18
Attending: INTERNAL MEDICINE
Payer: COMMERCIAL

## 2021-08-18 DIAGNOSIS — Z79.01 ANTICOAGULATION MONITORING, INR RANGE 2-3: ICD-10-CM

## 2021-08-18 DIAGNOSIS — Z95.811 LVAD (LEFT VENTRICULAR ASSIST DEVICE) PRESENT: Chronic | ICD-10-CM

## 2021-08-18 DIAGNOSIS — Z95.811 LVAD (LEFT VENTRICULAR ASSIST DEVICE) PRESENT: Primary | ICD-10-CM

## 2021-08-18 LAB
INR PPP: 2.6 (ref 0.8–1.2)
PROTHROMBIN TIME: 25.7 SEC (ref 9–12.5)

## 2021-08-18 PROCEDURE — 93793 PR ANTICOAGULANT MGMT FOR PT TAKING WARFARIN: ICD-10-PCS | Mod: S$GLB,,,

## 2021-08-18 PROCEDURE — 85610 PROTHROMBIN TIME: CPT | Mod: NTX | Performed by: INTERNAL MEDICINE

## 2021-08-18 PROCEDURE — 36415 COLL VENOUS BLD VENIPUNCTURE: CPT | Mod: NTX | Performed by: INTERNAL MEDICINE

## 2021-08-18 PROCEDURE — 93793 ANTICOAG MGMT PT WARFARIN: CPT | Mod: S$GLB,,,

## 2021-08-25 ENCOUNTER — ANTI-COAG VISIT (OUTPATIENT)
Dept: CARDIOLOGY | Facility: CLINIC | Age: 56
End: 2021-08-25
Payer: COMMERCIAL

## 2021-08-25 ENCOUNTER — LAB VISIT (OUTPATIENT)
Dept: LAB | Facility: HOSPITAL | Age: 56
End: 2021-08-25
Attending: INTERNAL MEDICINE
Payer: COMMERCIAL

## 2021-08-25 ENCOUNTER — PATIENT MESSAGE (OUTPATIENT)
Dept: TRANSPLANT | Facility: CLINIC | Age: 56
End: 2021-08-25

## 2021-08-25 DIAGNOSIS — Z95.811 LVAD (LEFT VENTRICULAR ASSIST DEVICE) PRESENT: Chronic | ICD-10-CM

## 2021-08-25 DIAGNOSIS — Z79.01 ANTICOAGULATION MONITORING, INR RANGE 2-3: ICD-10-CM

## 2021-08-25 DIAGNOSIS — Z95.811 LVAD (LEFT VENTRICULAR ASSIST DEVICE) PRESENT: Primary | ICD-10-CM

## 2021-08-25 LAB
INR PPP: 3.3 (ref 0.8–1.2)
PROTHROMBIN TIME: 32.4 SEC (ref 9–12.5)

## 2021-08-25 PROCEDURE — 93793 ANTICOAG MGMT PT WARFARIN: CPT | Mod: S$GLB,,,

## 2021-08-25 PROCEDURE — 93793 PR ANTICOAGULANT MGMT FOR PT TAKING WARFARIN: ICD-10-PCS | Mod: S$GLB,,,

## 2021-08-25 PROCEDURE — 36415 COLL VENOUS BLD VENIPUNCTURE: CPT | Mod: NTX | Performed by: INTERNAL MEDICINE

## 2021-08-25 PROCEDURE — 85610 PROTHROMBIN TIME: CPT | Mod: TXP | Performed by: INTERNAL MEDICINE

## 2021-08-27 ENCOUNTER — PATIENT MESSAGE (OUTPATIENT)
Dept: TRANSPLANT | Facility: CLINIC | Age: 56
End: 2021-08-27

## 2021-08-27 ENCOUNTER — ANTI-COAG VISIT (OUTPATIENT)
Dept: CARDIOLOGY | Facility: CLINIC | Age: 56
End: 2021-08-27
Payer: COMMERCIAL

## 2021-08-27 ENCOUNTER — LAB VISIT (OUTPATIENT)
Dept: LAB | Facility: HOSPITAL | Age: 56
End: 2021-08-27
Attending: INTERNAL MEDICINE
Payer: COMMERCIAL

## 2021-08-27 DIAGNOSIS — Z95.811 LVAD (LEFT VENTRICULAR ASSIST DEVICE) PRESENT: Chronic | ICD-10-CM

## 2021-08-27 DIAGNOSIS — Z95.811 LVAD (LEFT VENTRICULAR ASSIST DEVICE) PRESENT: Primary | ICD-10-CM

## 2021-08-27 DIAGNOSIS — Z79.01 ANTICOAGULATION MONITORING, INR RANGE 2-3: ICD-10-CM

## 2021-08-27 LAB
INR PPP: 3.1 (ref 0.8–1.2)
PROTHROMBIN TIME: 30.7 SEC (ref 9–12.5)

## 2021-08-27 PROCEDURE — 85610 PROTHROMBIN TIME: CPT | Mod: TXP | Performed by: INTERNAL MEDICINE

## 2021-08-27 PROCEDURE — 36415 COLL VENOUS BLD VENIPUNCTURE: CPT | Mod: NTX | Performed by: INTERNAL MEDICINE

## 2021-08-27 PROCEDURE — 93793 ANTICOAG MGMT PT WARFARIN: CPT | Mod: S$GLB,,,

## 2021-08-27 PROCEDURE — 93793 PR ANTICOAGULANT MGMT FOR PT TAKING WARFARIN: ICD-10-PCS | Mod: S$GLB,,,

## 2021-08-31 ENCOUNTER — PATIENT MESSAGE (OUTPATIENT)
Dept: CARDIOLOGY | Facility: CLINIC | Age: 56
End: 2021-08-31

## 2021-09-07 ENCOUNTER — ANTI-COAG VISIT (OUTPATIENT)
Dept: CARDIOLOGY | Facility: CLINIC | Age: 56
End: 2021-09-07

## 2021-09-07 DIAGNOSIS — Z95.811 LVAD (LEFT VENTRICULAR ASSIST DEVICE) PRESENT: Primary | ICD-10-CM

## 2021-09-07 DIAGNOSIS — Z79.01 ANTICOAGULATION MONITORING, INR RANGE 2-3: ICD-10-CM

## 2021-09-07 LAB — INR PPP: 1.6

## 2021-09-08 ENCOUNTER — PATIENT MESSAGE (OUTPATIENT)
Dept: CARDIOTHORACIC SURGERY | Facility: CLINIC | Age: 56
End: 2021-09-08

## 2021-09-10 ENCOUNTER — ANTI-COAG VISIT (OUTPATIENT)
Dept: CARDIOLOGY | Facility: CLINIC | Age: 56
End: 2021-09-10
Payer: COMMERCIAL

## 2021-09-10 DIAGNOSIS — Z95.811 LVAD (LEFT VENTRICULAR ASSIST DEVICE) PRESENT: Primary | ICD-10-CM

## 2021-09-10 DIAGNOSIS — Z79.01 ANTICOAGULATION MONITORING, INR RANGE 2-3: ICD-10-CM

## 2021-09-10 LAB — INR PPP: 2.3

## 2021-09-10 PROCEDURE — 93793 PR ANTICOAGULANT MGMT FOR PT TAKING WARFARIN: ICD-10-PCS | Mod: S$GLB,,,

## 2021-09-10 PROCEDURE — 93793 ANTICOAG MGMT PT WARFARIN: CPT | Mod: S$GLB,,,

## 2021-09-15 ENCOUNTER — CLINICAL SUPPORT (OUTPATIENT)
Dept: CARDIOLOGY | Facility: HOSPITAL | Age: 56
End: 2021-09-15
Attending: INTERNAL MEDICINE
Payer: COMMERCIAL

## 2021-09-15 DIAGNOSIS — Z95.810 PRESENCE OF AUTOMATIC (IMPLANTABLE) CARDIAC DEFIBRILLATOR: ICD-10-CM

## 2021-09-15 PROCEDURE — 93295 DEV INTERROG REMOTE 1/2/MLT: CPT | Mod: NTX,,, | Performed by: INTERNAL MEDICINE

## 2021-09-15 PROCEDURE — 93296 REM INTERROG EVL PM/IDS: CPT | Mod: NTX | Performed by: INTERNAL MEDICINE

## 2021-09-15 PROCEDURE — 93295 CARDIAC DEVICE CHECK - REMOTE: ICD-10-PCS | Mod: NTX,,, | Performed by: INTERNAL MEDICINE

## 2021-09-16 ENCOUNTER — OFFICE VISIT (OUTPATIENT)
Dept: TRANSPLANT | Facility: CLINIC | Age: 56
End: 2021-09-16
Payer: COMMERCIAL

## 2021-09-16 ENCOUNTER — LAB VISIT (OUTPATIENT)
Dept: LAB | Facility: HOSPITAL | Age: 56
End: 2021-09-16
Attending: INTERNAL MEDICINE
Payer: COMMERCIAL

## 2021-09-16 ENCOUNTER — PATIENT MESSAGE (OUTPATIENT)
Dept: TRANSPLANT | Facility: CLINIC | Age: 56
End: 2021-09-16

## 2021-09-16 ENCOUNTER — ANTI-COAG VISIT (OUTPATIENT)
Dept: CARDIOLOGY | Facility: CLINIC | Age: 56
End: 2021-09-16
Payer: COMMERCIAL

## 2021-09-16 ENCOUNTER — TELEPHONE (OUTPATIENT)
Dept: TRANSPLANT | Facility: CLINIC | Age: 56
End: 2021-09-16

## 2021-09-16 ENCOUNTER — CLINICAL SUPPORT (OUTPATIENT)
Dept: TRANSPLANT | Facility: CLINIC | Age: 56
End: 2021-09-16
Payer: COMMERCIAL

## 2021-09-16 VITALS — WEIGHT: 274 LBS | TEMPERATURE: 98 F | SYSTOLIC BLOOD PRESSURE: 88 MMHG | HEIGHT: 73 IN | BODY MASS INDEX: 36.31 KG/M2

## 2021-09-16 DIAGNOSIS — G47.33 OSA (OBSTRUCTIVE SLEEP APNEA): ICD-10-CM

## 2021-09-16 DIAGNOSIS — I50.42 CHRONIC COMBINED SYSTOLIC AND DIASTOLIC HEART FAILURE: ICD-10-CM

## 2021-09-16 DIAGNOSIS — Z95.811 HEART REPLACED BY HEART ASSIST DEVICE: ICD-10-CM

## 2021-09-16 DIAGNOSIS — Z95.811 LVAD (LEFT VENTRICULAR ASSIST DEVICE) PRESENT: Primary | ICD-10-CM

## 2021-09-16 DIAGNOSIS — Z79.01 ANTICOAGULATION MONITORING, INR RANGE 2-3: ICD-10-CM

## 2021-09-16 DIAGNOSIS — I47.20 VT (VENTRICULAR TACHYCARDIA): Chronic | ICD-10-CM

## 2021-09-16 DIAGNOSIS — Z95.811 LVAD (LEFT VENTRICULAR ASSIST DEVICE) PRESENT: Chronic | ICD-10-CM

## 2021-09-16 DIAGNOSIS — Z45.02 IMPLANTABLE CARDIOVERTER-DEFIBRILLATOR (ICD) DISCHARGE: ICD-10-CM

## 2021-09-16 DIAGNOSIS — N18.32 STAGE 3B CHRONIC KIDNEY DISEASE: Chronic | ICD-10-CM

## 2021-09-16 DIAGNOSIS — R06.02 SOB (SHORTNESS OF BREATH): ICD-10-CM

## 2021-09-16 DIAGNOSIS — I10 ESSENTIAL HYPERTENSION: Primary | Chronic | ICD-10-CM

## 2021-09-16 PROBLEM — R00.2 PALPITATIONS: Chronic | Status: RESOLVED | Noted: 2019-10-28 | Resolved: 2021-09-16

## 2021-09-16 PROBLEM — Z45.2 PICC (PERIPHERALLY INSERTED CENTRAL CATHETER) REMOVAL: Status: RESOLVED | Noted: 2020-08-04 | Resolved: 2021-09-16

## 2021-09-16 LAB
ALBUMIN SERPL BCP-MCNC: 4 G/DL (ref 3.5–5.2)
ALP SERPL-CCNC: 152 U/L (ref 55–135)
ALT SERPL W/O P-5'-P-CCNC: 28 U/L (ref 10–44)
ANION GAP SERPL CALC-SCNC: 13 MMOL/L (ref 8–16)
AST SERPL-CCNC: 25 U/L (ref 10–40)
BASOPHILS # BLD AUTO: 0.02 K/UL (ref 0–0.2)
BASOPHILS NFR BLD: 0.6 % (ref 0–1.9)
BILIRUB DIRECT SERPL-MCNC: 0.2 MG/DL (ref 0.1–0.3)
BILIRUB SERPL-MCNC: 0.6 MG/DL (ref 0.1–1)
BNP SERPL-MCNC: 112 PG/ML (ref 0–99)
BUN SERPL-MCNC: 12 MG/DL (ref 6–20)
CALCIUM SERPL-MCNC: 9.4 MG/DL (ref 8.7–10.5)
CHLORIDE SERPL-SCNC: 102 MMOL/L (ref 95–110)
CO2 SERPL-SCNC: 23 MMOL/L (ref 23–29)
CREAT SERPL-MCNC: 2.1 MG/DL (ref 0.5–1.4)
CRP SERPL-MCNC: 25.2 MG/L (ref 0–8.2)
DIFFERENTIAL METHOD: ABNORMAL
EOSINOPHIL # BLD AUTO: 0 K/UL (ref 0–0.5)
EOSINOPHIL NFR BLD: 1.1 % (ref 0–8)
ERYTHROCYTE [DISTWIDTH] IN BLOOD BY AUTOMATED COUNT: 15.2 % (ref 11.5–14.5)
EST. GFR  (AFRICAN AMERICAN): 40 ML/MIN/1.73 M^2
EST. GFR  (NON AFRICAN AMERICAN): 34.6 ML/MIN/1.73 M^2
GLUCOSE SERPL-MCNC: 83 MG/DL (ref 70–110)
HCT VFR BLD AUTO: 42.2 % (ref 40–54)
HGB BLD-MCNC: 13.1 G/DL (ref 14–18)
IMM GRANULOCYTES # BLD AUTO: 0.01 K/UL (ref 0–0.04)
IMM GRANULOCYTES NFR BLD AUTO: 0.3 % (ref 0–0.5)
INR PPP: 2.2 (ref 0.8–1.2)
LDH SERPL L TO P-CCNC: 304 U/L (ref 110–260)
LYMPHOCYTES # BLD AUTO: 0.6 K/UL (ref 1–4.8)
LYMPHOCYTES NFR BLD: 17.7 % (ref 18–48)
MAGNESIUM SERPL-MCNC: 2.3 MG/DL (ref 1.6–2.6)
MCH RBC QN AUTO: 27 PG (ref 27–31)
MCHC RBC AUTO-ENTMCNC: 31 G/DL (ref 32–36)
MCV RBC AUTO: 87 FL (ref 82–98)
MONOCYTES # BLD AUTO: 0.4 K/UL (ref 0.3–1)
MONOCYTES NFR BLD: 11.4 % (ref 4–15)
NEUTROPHILS # BLD AUTO: 2.4 K/UL (ref 1.8–7.7)
NEUTROPHILS NFR BLD: 68.9 % (ref 38–73)
NRBC BLD-RTO: 0 /100 WBC
PHOSPHATE SERPL-MCNC: 2.1 MG/DL (ref 2.7–4.5)
PLATELET # BLD AUTO: 250 K/UL (ref 150–450)
PMV BLD AUTO: 10.8 FL (ref 9.2–12.9)
POTASSIUM SERPL-SCNC: 3.8 MMOL/L (ref 3.5–5.1)
PREALB SERPL-MCNC: 23 MG/DL (ref 20–43)
PROT SERPL-MCNC: 8.2 G/DL (ref 6–8.4)
PROTHROMBIN TIME: 22.6 SEC (ref 9–12.5)
RBC # BLD AUTO: 4.86 M/UL (ref 4.6–6.2)
SODIUM SERPL-SCNC: 138 MMOL/L (ref 136–145)
WBC # BLD AUTO: 3.5 K/UL (ref 3.9–12.7)

## 2021-09-16 PROCEDURE — 80053 COMPREHEN METABOLIC PANEL: CPT | Mod: NTX | Performed by: INTERNAL MEDICINE

## 2021-09-16 PROCEDURE — 36415 COLL VENOUS BLD VENIPUNCTURE: CPT | Mod: NTX | Performed by: INTERNAL MEDICINE

## 2021-09-16 PROCEDURE — 82248 BILIRUBIN DIRECT: CPT | Mod: NTX | Performed by: INTERNAL MEDICINE

## 2021-09-16 PROCEDURE — 83735 ASSAY OF MAGNESIUM: CPT | Mod: NTX | Performed by: INTERNAL MEDICINE

## 2021-09-16 PROCEDURE — 99214 PR OFFICE/OUTPT VISIT, EST, LEVL IV, 30-39 MIN: ICD-10-PCS | Mod: NTX,S$GLB,, | Performed by: INTERNAL MEDICINE

## 2021-09-16 PROCEDURE — 84100 ASSAY OF PHOSPHORUS: CPT | Mod: TXP | Performed by: INTERNAL MEDICINE

## 2021-09-16 PROCEDURE — 85610 PROTHROMBIN TIME: CPT | Mod: NTX | Performed by: INTERNAL MEDICINE

## 2021-09-16 PROCEDURE — 84134 ASSAY OF PREALBUMIN: CPT | Mod: NTX | Performed by: INTERNAL MEDICINE

## 2021-09-16 PROCEDURE — 85025 COMPLETE CBC W/AUTO DIFF WBC: CPT | Mod: TXP | Performed by: INTERNAL MEDICINE

## 2021-09-16 PROCEDURE — 86140 C-REACTIVE PROTEIN: CPT | Mod: NTX | Performed by: INTERNAL MEDICINE

## 2021-09-16 PROCEDURE — 99999 PR PBB SHADOW E&M-EST. PATIENT-LVL III: CPT | Mod: PBBFAC,TXP,, | Performed by: INTERNAL MEDICINE

## 2021-09-16 PROCEDURE — 83880 ASSAY OF NATRIURETIC PEPTIDE: CPT | Mod: TXP | Performed by: INTERNAL MEDICINE

## 2021-09-16 PROCEDURE — 99214 OFFICE O/P EST MOD 30 MIN: CPT | Mod: NTX,S$GLB,, | Performed by: INTERNAL MEDICINE

## 2021-09-16 PROCEDURE — 99999 PR PBB SHADOW E&M-EST. PATIENT-LVL III: ICD-10-PCS | Mod: PBBFAC,TXP,, | Performed by: INTERNAL MEDICINE

## 2021-09-16 PROCEDURE — 83615 LACTATE (LD) (LDH) ENZYME: CPT | Mod: TXP | Performed by: INTERNAL MEDICINE

## 2021-09-27 ENCOUNTER — PATIENT MESSAGE (OUTPATIENT)
Dept: CARDIOLOGY | Facility: CLINIC | Age: 56
End: 2021-09-27

## 2021-09-29 ENCOUNTER — ANTI-COAG VISIT (OUTPATIENT)
Dept: CARDIOLOGY | Facility: CLINIC | Age: 56
End: 2021-09-29
Payer: COMMERCIAL

## 2021-09-29 ENCOUNTER — LAB VISIT (OUTPATIENT)
Dept: LAB | Facility: HOSPITAL | Age: 56
End: 2021-09-29
Attending: INTERNAL MEDICINE
Payer: COMMERCIAL

## 2021-09-29 DIAGNOSIS — Z79.01 ANTICOAGULATION MONITORING, INR RANGE 2-3: ICD-10-CM

## 2021-09-29 DIAGNOSIS — Z95.811 LVAD (LEFT VENTRICULAR ASSIST DEVICE) PRESENT: Primary | ICD-10-CM

## 2021-09-29 DIAGNOSIS — Z95.811 LVAD (LEFT VENTRICULAR ASSIST DEVICE) PRESENT: ICD-10-CM

## 2021-09-29 LAB
INR PPP: 2.9 (ref 0.8–1.2)
PROTHROMBIN TIME: 29.2 SEC (ref 9–12.5)

## 2021-09-29 PROCEDURE — 85610 PROTHROMBIN TIME: CPT | Mod: NTX | Performed by: INTERNAL MEDICINE

## 2021-09-29 PROCEDURE — 36415 COLL VENOUS BLD VENIPUNCTURE: CPT | Mod: NTX | Performed by: INTERNAL MEDICINE

## 2021-09-29 PROCEDURE — 93793 PR ANTICOAGULANT MGMT FOR PT TAKING WARFARIN: ICD-10-PCS | Mod: S$GLB,,,

## 2021-09-29 PROCEDURE — 93793 ANTICOAG MGMT PT WARFARIN: CPT | Mod: S$GLB,,,

## 2021-10-05 ENCOUNTER — ANTI-COAG VISIT (OUTPATIENT)
Dept: CARDIOLOGY | Facility: CLINIC | Age: 56
End: 2021-10-05
Payer: COMMERCIAL

## 2021-10-05 ENCOUNTER — LAB VISIT (OUTPATIENT)
Dept: LAB | Facility: HOSPITAL | Age: 56
End: 2021-10-05
Attending: INTERNAL MEDICINE
Payer: COMMERCIAL

## 2021-10-05 DIAGNOSIS — Z79.01 ANTICOAGULATION MONITORING, INR RANGE 2-3: ICD-10-CM

## 2021-10-05 DIAGNOSIS — Z95.811 LVAD (LEFT VENTRICULAR ASSIST DEVICE) PRESENT: Primary | ICD-10-CM

## 2021-10-05 DIAGNOSIS — Z95.811 LVAD (LEFT VENTRICULAR ASSIST DEVICE) PRESENT: Chronic | ICD-10-CM

## 2021-10-05 LAB
INR PPP: 3.9 (ref 0.8–1.2)
PROTHROMBIN TIME: 37.7 SEC (ref 9–12.5)

## 2021-10-05 PROCEDURE — 93793 ANTICOAG MGMT PT WARFARIN: CPT | Mod: S$GLB,,,

## 2021-10-05 PROCEDURE — 93793 PR ANTICOAGULANT MGMT FOR PT TAKING WARFARIN: ICD-10-PCS | Mod: S$GLB,,,

## 2021-10-05 PROCEDURE — 36415 COLL VENOUS BLD VENIPUNCTURE: CPT | Mod: NTX | Performed by: INTERNAL MEDICINE

## 2021-10-05 PROCEDURE — 85610 PROTHROMBIN TIME: CPT | Mod: NTX | Performed by: INTERNAL MEDICINE

## 2021-10-07 ENCOUNTER — LAB VISIT (OUTPATIENT)
Dept: LAB | Facility: HOSPITAL | Age: 56
End: 2021-10-07
Attending: INTERNAL MEDICINE
Payer: COMMERCIAL

## 2021-10-07 DIAGNOSIS — Z95.811 LVAD (LEFT VENTRICULAR ASSIST DEVICE) PRESENT: Chronic | ICD-10-CM

## 2021-10-07 LAB
INR PPP: 2.6 (ref 0.8–1.2)
PROTHROMBIN TIME: 26.1 SEC (ref 9–12.5)

## 2021-10-07 PROCEDURE — 85610 PROTHROMBIN TIME: CPT | Mod: TXP | Performed by: INTERNAL MEDICINE

## 2021-10-07 PROCEDURE — 36415 COLL VENOUS BLD VENIPUNCTURE: CPT | Mod: NTX | Performed by: INTERNAL MEDICINE

## 2021-10-08 ENCOUNTER — ANTI-COAG VISIT (OUTPATIENT)
Dept: CARDIOLOGY | Facility: CLINIC | Age: 56
End: 2021-10-08
Payer: COMMERCIAL

## 2021-10-08 DIAGNOSIS — Z79.01 ANTICOAGULATION MONITORING, INR RANGE 2-3: ICD-10-CM

## 2021-10-08 DIAGNOSIS — Z95.811 LVAD (LEFT VENTRICULAR ASSIST DEVICE) PRESENT: Primary | ICD-10-CM

## 2021-10-08 PROCEDURE — 93793 ANTICOAG MGMT PT WARFARIN: CPT | Mod: S$GLB,,,

## 2021-10-08 PROCEDURE — 93793 PR ANTICOAGULANT MGMT FOR PT TAKING WARFARIN: ICD-10-PCS | Mod: S$GLB,,,

## 2021-10-12 DIAGNOSIS — Z79.899 HIGH RISK MEDICATIONS (NOT ANTICOAGULANTS) LONG-TERM USE: ICD-10-CM

## 2021-10-12 DIAGNOSIS — Z95.811 LVAD (LEFT VENTRICULAR ASSIST DEVICE) PRESENT: Primary | ICD-10-CM

## 2021-10-14 ENCOUNTER — PATIENT MESSAGE (OUTPATIENT)
Dept: TRANSPLANT | Facility: CLINIC | Age: 56
End: 2021-10-14
Payer: COMMERCIAL

## 2021-10-14 ENCOUNTER — DOCUMENTATION ONLY (OUTPATIENT)
Dept: TRANSPLANT | Facility: CLINIC | Age: 56
End: 2021-10-14

## 2021-10-14 ENCOUNTER — TELEPHONE (OUTPATIENT)
Dept: TRANSPLANT | Facility: CLINIC | Age: 56
End: 2021-10-14

## 2021-10-15 ENCOUNTER — LAB VISIT (OUTPATIENT)
Dept: LAB | Facility: HOSPITAL | Age: 56
End: 2021-10-15
Attending: INTERNAL MEDICINE
Payer: COMMERCIAL

## 2021-10-15 ENCOUNTER — ANTI-COAG VISIT (OUTPATIENT)
Dept: CARDIOLOGY | Facility: CLINIC | Age: 56
End: 2021-10-15
Payer: COMMERCIAL

## 2021-10-15 DIAGNOSIS — Z79.01 ANTICOAGULATION MONITORING, INR RANGE 2-3: ICD-10-CM

## 2021-10-15 DIAGNOSIS — Z95.811 LVAD (LEFT VENTRICULAR ASSIST DEVICE) PRESENT: ICD-10-CM

## 2021-10-15 DIAGNOSIS — Z95.811 LVAD (LEFT VENTRICULAR ASSIST DEVICE) PRESENT: Primary | ICD-10-CM

## 2021-10-15 LAB
INR PPP: 2.3 (ref 0.8–1.2)
PROTHROMBIN TIME: 23.2 SEC (ref 9–12.5)

## 2021-10-15 PROCEDURE — 93793 ANTICOAG MGMT PT WARFARIN: CPT | Mod: S$GLB,,,

## 2021-10-15 PROCEDURE — 85610 PROTHROMBIN TIME: CPT | Performed by: INTERNAL MEDICINE

## 2021-10-15 PROCEDURE — 36415 COLL VENOUS BLD VENIPUNCTURE: CPT | Performed by: INTERNAL MEDICINE

## 2021-10-15 PROCEDURE — 93793 PR ANTICOAGULANT MGMT FOR PT TAKING WARFARIN: ICD-10-PCS | Mod: S$GLB,,,

## 2021-10-18 ENCOUNTER — PATIENT MESSAGE (OUTPATIENT)
Dept: TRANSPLANT | Facility: CLINIC | Age: 56
End: 2021-10-18
Payer: COMMERCIAL

## 2021-10-21 ENCOUNTER — PATIENT OUTREACH (OUTPATIENT)
Dept: ADMINISTRATIVE | Facility: OTHER | Age: 56
End: 2021-10-21

## 2021-10-22 ENCOUNTER — ANTI-COAG VISIT (OUTPATIENT)
Dept: CARDIOLOGY | Facility: CLINIC | Age: 56
End: 2021-10-22
Payer: COMMERCIAL

## 2021-10-22 ENCOUNTER — LAB VISIT (OUTPATIENT)
Dept: LAB | Facility: HOSPITAL | Age: 56
End: 2021-10-22
Attending: INTERNAL MEDICINE
Payer: COMMERCIAL

## 2021-10-22 DIAGNOSIS — Z95.811 LVAD (LEFT VENTRICULAR ASSIST DEVICE) PRESENT: Chronic | ICD-10-CM

## 2021-10-22 DIAGNOSIS — Z95.811 LVAD (LEFT VENTRICULAR ASSIST DEVICE) PRESENT: Primary | ICD-10-CM

## 2021-10-22 DIAGNOSIS — Z79.01 ANTICOAGULATION MONITORING, INR RANGE 2-3: ICD-10-CM

## 2021-10-22 LAB
INR PPP: 2.3 (ref 0.8–1.2)
PROTHROMBIN TIME: 23.1 SEC (ref 9–12.5)

## 2021-10-22 PROCEDURE — 93793 PR ANTICOAGULANT MGMT FOR PT TAKING WARFARIN: ICD-10-PCS | Mod: S$GLB,,,

## 2021-10-22 PROCEDURE — 36415 COLL VENOUS BLD VENIPUNCTURE: CPT | Performed by: INTERNAL MEDICINE

## 2021-10-22 PROCEDURE — 85610 PROTHROMBIN TIME: CPT | Performed by: INTERNAL MEDICINE

## 2021-10-22 PROCEDURE — 93793 ANTICOAG MGMT PT WARFARIN: CPT | Mod: S$GLB,,,

## 2021-11-02 ENCOUNTER — ANTI-COAG VISIT (OUTPATIENT)
Dept: CARDIOLOGY | Facility: CLINIC | Age: 56
End: 2021-11-02
Payer: COMMERCIAL

## 2021-11-02 ENCOUNTER — LAB VISIT (OUTPATIENT)
Dept: LAB | Facility: HOSPITAL | Age: 56
End: 2021-11-02
Attending: INTERNAL MEDICINE
Payer: COMMERCIAL

## 2021-11-02 DIAGNOSIS — E87.70 HYPERVOLEMIA, UNSPECIFIED HYPERVOLEMIA TYPE: ICD-10-CM

## 2021-11-02 DIAGNOSIS — I50.9 HEART FAILURE, UNSPECIFIED HF CHRONICITY, UNSPECIFIED HEART FAILURE TYPE: ICD-10-CM

## 2021-11-02 DIAGNOSIS — Z79.01 ANTICOAGULATION MONITORING, INR RANGE 2-3: ICD-10-CM

## 2021-11-02 DIAGNOSIS — Z76.82 ORGAN TRANSPLANT CANDIDATE: ICD-10-CM

## 2021-11-02 DIAGNOSIS — Z95.811 LVAD (LEFT VENTRICULAR ASSIST DEVICE) PRESENT: Primary | ICD-10-CM

## 2021-11-02 LAB
INR PPP: 4.9 (ref 0.8–1.2)
PROTHROMBIN TIME: 47.6 SEC (ref 9–12.5)

## 2021-11-02 PROCEDURE — 93793 PR ANTICOAGULANT MGMT FOR PT TAKING WARFARIN: ICD-10-PCS | Mod: S$GLB,,,

## 2021-11-02 PROCEDURE — 85610 PROTHROMBIN TIME: CPT | Performed by: INTERNAL MEDICINE

## 2021-11-02 PROCEDURE — 93793 ANTICOAG MGMT PT WARFARIN: CPT | Mod: S$GLB,,,

## 2021-11-02 PROCEDURE — 36415 COLL VENOUS BLD VENIPUNCTURE: CPT | Performed by: INTERNAL MEDICINE

## 2021-11-04 ENCOUNTER — LAB VISIT (OUTPATIENT)
Dept: LAB | Facility: HOSPITAL | Age: 56
End: 2021-11-04
Attending: INTERNAL MEDICINE
Payer: COMMERCIAL

## 2021-11-04 DIAGNOSIS — Z95.811 LVAD (LEFT VENTRICULAR ASSIST DEVICE) PRESENT: Chronic | ICD-10-CM

## 2021-11-04 LAB
INR PPP: 2.9 (ref 0.8–1.2)
PROTHROMBIN TIME: 29.1 SEC (ref 9–12.5)

## 2021-11-04 PROCEDURE — 36415 COLL VENOUS BLD VENIPUNCTURE: CPT | Performed by: INTERNAL MEDICINE

## 2021-11-04 PROCEDURE — 85610 PROTHROMBIN TIME: CPT | Performed by: INTERNAL MEDICINE

## 2021-11-05 ENCOUNTER — ANTI-COAG VISIT (OUTPATIENT)
Dept: CARDIOLOGY | Facility: CLINIC | Age: 56
End: 2021-11-05
Payer: COMMERCIAL

## 2021-11-05 DIAGNOSIS — Z95.811 LVAD (LEFT VENTRICULAR ASSIST DEVICE) PRESENT: Primary | ICD-10-CM

## 2021-11-05 DIAGNOSIS — Z79.01 ANTICOAGULATION MONITORING, INR RANGE 2-3: ICD-10-CM

## 2021-11-05 PROCEDURE — 93793 PR ANTICOAGULANT MGMT FOR PT TAKING WARFARIN: ICD-10-PCS | Mod: S$GLB,,,

## 2021-11-05 PROCEDURE — 93793 ANTICOAG MGMT PT WARFARIN: CPT | Mod: S$GLB,,,

## 2021-11-10 ENCOUNTER — TELEPHONE (OUTPATIENT)
Dept: TRANSPLANT | Facility: CLINIC | Age: 56
End: 2021-11-10
Payer: COMMERCIAL

## 2021-11-11 ENCOUNTER — CLINICAL SUPPORT (OUTPATIENT)
Dept: TRANSPLANT | Facility: CLINIC | Age: 56
End: 2021-11-11
Payer: COMMERCIAL

## 2021-11-11 ENCOUNTER — ANTI-COAG VISIT (OUTPATIENT)
Dept: CARDIOLOGY | Facility: CLINIC | Age: 56
End: 2021-11-11
Payer: COMMERCIAL

## 2021-11-11 ENCOUNTER — HOSPITAL ENCOUNTER (OUTPATIENT)
Dept: CARDIOLOGY | Facility: HOSPITAL | Age: 56
Discharge: HOME | End: 2021-11-11
Attending: INTERNAL MEDICINE
Payer: COMMERCIAL

## 2021-11-11 ENCOUNTER — HOSPITAL ENCOUNTER (OUTPATIENT)
Dept: RADIOLOGY | Facility: HOSPITAL | Age: 56
Discharge: HOME | End: 2021-11-11
Attending: INTERNAL MEDICINE
Payer: COMMERCIAL

## 2021-11-11 ENCOUNTER — OFFICE VISIT (OUTPATIENT)
Dept: TRANSPLANT | Facility: CLINIC | Age: 56
End: 2021-11-11
Payer: COMMERCIAL

## 2021-11-11 VITALS — TEMPERATURE: 99 F | WEIGHT: 273.5 LBS | HEIGHT: 73 IN | SYSTOLIC BLOOD PRESSURE: 78 MMHG | BODY MASS INDEX: 36.25 KG/M2

## 2021-11-11 VITALS — WEIGHT: 274 LBS | BODY MASS INDEX: 36.31 KG/M2 | HEART RATE: 100 BPM | HEIGHT: 73 IN | SYSTOLIC BLOOD PRESSURE: 78 MMHG

## 2021-11-11 DIAGNOSIS — I47.20 VT (VENTRICULAR TACHYCARDIA): Chronic | ICD-10-CM

## 2021-11-11 DIAGNOSIS — Z79.01 ANTICOAGULATION MONITORING, INR RANGE 2-3: ICD-10-CM

## 2021-11-11 DIAGNOSIS — Z95.811 LVAD (LEFT VENTRICULAR ASSIST DEVICE) PRESENT: Primary | ICD-10-CM

## 2021-11-11 DIAGNOSIS — I42.0 DILATED CARDIOMYOPATHY: ICD-10-CM

## 2021-11-11 DIAGNOSIS — Z76.82 ORGAN TRANSPLANT CANDIDATE: ICD-10-CM

## 2021-11-11 DIAGNOSIS — E87.70 HYPERVOLEMIA, UNSPECIFIED HYPERVOLEMIA TYPE: ICD-10-CM

## 2021-11-11 DIAGNOSIS — I10 ESSENTIAL HYPERTENSION: ICD-10-CM

## 2021-11-11 DIAGNOSIS — I50.9 HEART FAILURE, UNSPECIFIED HF CHRONICITY, UNSPECIFIED HEART FAILURE TYPE: ICD-10-CM

## 2021-11-11 DIAGNOSIS — Z95.811 HEART REPLACED BY HEART ASSIST DEVICE: ICD-10-CM

## 2021-11-11 DIAGNOSIS — Z95.811 LVAD (LEFT VENTRICULAR ASSIST DEVICE) PRESENT: ICD-10-CM

## 2021-11-11 DIAGNOSIS — Z95.811 LVAD (LEFT VENTRICULAR ASSIST DEVICE) PRESENT: Primary | Chronic | ICD-10-CM

## 2021-11-11 DIAGNOSIS — I50.42 CHRONIC COMBINED SYSTOLIC AND DIASTOLIC HEART FAILURE: ICD-10-CM

## 2021-11-11 DIAGNOSIS — I13.0 HYPERTENSIVE CARDIOVASCULAR-RENAL DISEASE, STAGE 1-4 OR UNSPECIFIED CHRONIC KIDNEY DISEASE, WITH HEART FAILURE: ICD-10-CM

## 2021-11-11 LAB
ASCENDING AORTA: 3.07 CM
BSA FOR ECHO PROCEDURE: 2.53 M2
CV ECHO LV RWT: 0.25 CM
DOP CALC LVOT AREA: 4.5 CM2
DOP CALC LVOT DIAMETER: 2.39 CM
E/E' RATIO: 15.75 M/S
ECHO LV POSTERIOR WALL: 1.18 CM (ref 0.6–1.1)
EJECTION FRACTION: 15 %
FRACTIONAL SHORTENING: 5 % (ref 28–44)
INTERVENTRICULAR SEPTUM: 0.39 CM (ref 0.6–1.1)
IVRT: 91.34 MSEC
LA MAJOR: 7.72 CM
LA MINOR: 7.77 CM
LA WIDTH: 6.38 CM
LEFT ATRIUM SIZE: 4.63 CM
LEFT ATRIUM VOLUME INDEX MOD: 70.4 ML/M2
LEFT ATRIUM VOLUME INDEX: 79.1 ML/M2
LEFT ATRIUM VOLUME MOD: 173.08 CM3
LEFT ATRIUM VOLUME: 194.46 CM3
LEFT INTERNAL DIMENSION IN SYSTOLE: 9.17 CM (ref 2.1–4)
LEFT VENTRICLE DIASTOLIC VOLUME INDEX: 211.03 ML/M2
LEFT VENTRICLE DIASTOLIC VOLUME: 519.13 ML
LEFT VENTRICLE MASS INDEX: 173 G/M2
LEFT VENTRICLE SYSTOLIC VOLUME INDEX: 189.7 ML/M2
LEFT VENTRICLE SYSTOLIC VOLUME: 466.65 ML
LEFT VENTRICULAR INTERNAL DIMENSION IN DIASTOLE: 9.63 CM (ref 3.5–6)
LEFT VENTRICULAR MASS: 426.48 G
LV LATERAL E/E' RATIO: 12.6 M/S
LV SEPTAL E/E' RATIO: 21 M/S
MV PEAK E VEL: 1.26 M/S
PISA MRMAX VEL: 0.04 M/S
PISA TR MAX VEL: 2.89 M/S
PULM VEIN S/D RATIO: 1.28
PV PEAK D VEL: 0.4 M/S
PV PEAK S VEL: 0.51 M/S
RA MAJOR: 6.84 CM
RA PRESSURE: 8 MMHG
RA WIDTH: 5.19 CM
RIGHT VENTRICULAR END-DIASTOLIC DIMENSION: 3.38 CM
RV TISSUE DOPPLER FREE WALL SYSTOLIC VELOCITY 1 (APICAL 4 CHAMBER VIEW): 11.31 CM/S
SINUS: 3.26 CM
STJ: 2.78 CM
TDI LATERAL: 0.1 M/S
TDI SEPTAL: 0.06 M/S
TDI: 0.08 M/S
TR MAX PG: 33 MMHG
TRICUSPID ANNULAR PLANE SYSTOLIC EXCURSION: 1.66 CM
TV REST PULMONARY ARTERY PRESSURE: 41 MMHG

## 2021-11-11 PROCEDURE — 93306 TTE W/DOPPLER COMPLETE: CPT

## 2021-11-11 PROCEDURE — 93306 ECHO (CUPID ONLY): ICD-10-PCS | Mod: 26,,, | Performed by: INTERNAL MEDICINE

## 2021-11-11 PROCEDURE — 99214 PR OFFICE/OUTPT VISIT, EST, LEVL IV, 30-39 MIN: ICD-10-PCS | Mod: S$GLB,,, | Performed by: INTERNAL MEDICINE

## 2021-11-11 PROCEDURE — 93750 OP LVAD INTERROGATION: ICD-10-PCS | Mod: S$GLB,,, | Performed by: INTERNAL MEDICINE

## 2021-11-11 PROCEDURE — 99214 OFFICE O/P EST MOD 30 MIN: CPT | Mod: S$GLB,,, | Performed by: INTERNAL MEDICINE

## 2021-11-11 PROCEDURE — 99999 PR PBB SHADOW E&M-EST. PATIENT-LVL II: CPT | Mod: PBBFAC,,, | Performed by: INTERNAL MEDICINE

## 2021-11-11 PROCEDURE — 71046 XR CHEST PA AND LATERAL: ICD-10-PCS | Mod: 26,,, | Performed by: RADIOLOGY

## 2021-11-11 PROCEDURE — 71046 X-RAY EXAM CHEST 2 VIEWS: CPT | Mod: TC,FY

## 2021-11-11 PROCEDURE — 99999 PR PBB SHADOW E&M-EST. PATIENT-LVL II: ICD-10-PCS | Mod: PBBFAC,,, | Performed by: INTERNAL MEDICINE

## 2021-11-11 PROCEDURE — 93306 TTE W/DOPPLER COMPLETE: CPT | Mod: 26,,, | Performed by: INTERNAL MEDICINE

## 2021-11-11 PROCEDURE — 71046 X-RAY EXAM CHEST 2 VIEWS: CPT | Mod: 26,,, | Performed by: RADIOLOGY

## 2021-11-11 PROCEDURE — 93750 INTERROGATION VAD IN PERSON: CPT | Mod: S$GLB,,, | Performed by: INTERNAL MEDICINE

## 2021-11-16 ENCOUNTER — PATIENT MESSAGE (OUTPATIENT)
Dept: ENDOCRINOLOGY | Facility: CLINIC | Age: 56
End: 2021-11-16
Payer: COMMERCIAL

## 2021-11-17 ENCOUNTER — ANTI-COAG VISIT (OUTPATIENT)
Dept: CARDIOLOGY | Facility: CLINIC | Age: 56
End: 2021-11-17
Payer: COMMERCIAL

## 2021-11-17 ENCOUNTER — LAB VISIT (OUTPATIENT)
Dept: LAB | Facility: HOSPITAL | Age: 56
End: 2021-11-17
Attending: INTERNAL MEDICINE
Payer: COMMERCIAL

## 2021-11-17 DIAGNOSIS — Z95.811 LVAD (LEFT VENTRICULAR ASSIST DEVICE) PRESENT: Primary | ICD-10-CM

## 2021-11-17 DIAGNOSIS — Z79.01 ANTICOAGULATION MONITORING, INR RANGE 2-3: ICD-10-CM

## 2021-11-17 DIAGNOSIS — Z95.811 LVAD (LEFT VENTRICULAR ASSIST DEVICE) PRESENT: Chronic | ICD-10-CM

## 2021-11-17 LAB
INR PPP: 2.2 (ref 0.8–1.2)
PROTHROMBIN TIME: 22.5 SEC (ref 9–12.5)

## 2021-11-17 PROCEDURE — 36415 COLL VENOUS BLD VENIPUNCTURE: CPT | Performed by: INTERNAL MEDICINE

## 2021-11-17 PROCEDURE — 85610 PROTHROMBIN TIME: CPT | Performed by: INTERNAL MEDICINE

## 2021-11-17 PROCEDURE — 93793 ANTICOAG MGMT PT WARFARIN: CPT | Mod: S$GLB,,,

## 2021-11-17 PROCEDURE — 93793 PR ANTICOAGULANT MGMT FOR PT TAKING WARFARIN: ICD-10-PCS | Mod: S$GLB,,,

## 2021-11-22 ENCOUNTER — TELEPHONE (OUTPATIENT)
Dept: CARDIOLOGY | Facility: HOSPITAL | Age: 56
End: 2021-11-22
Payer: COMMERCIAL

## 2021-11-23 RX ORDER — MEXILETINE HYDROCHLORIDE 250 MG/1
250 CAPSULE ORAL EVERY 8 HOURS
Qty: 270 CAPSULE | Refills: 3 | Status: SHIPPED | OUTPATIENT
Start: 2021-11-23 | End: 2021-11-30 | Stop reason: SDUPTHER

## 2021-11-24 ENCOUNTER — LAB VISIT (OUTPATIENT)
Dept: LAB | Facility: HOSPITAL | Age: 56
End: 2021-11-24
Attending: INTERNAL MEDICINE
Payer: COMMERCIAL

## 2021-11-24 ENCOUNTER — ANTI-COAG VISIT (OUTPATIENT)
Dept: CARDIOLOGY | Facility: CLINIC | Age: 56
End: 2021-11-24
Payer: COMMERCIAL

## 2021-11-24 DIAGNOSIS — Z95.811 LVAD (LEFT VENTRICULAR ASSIST DEVICE) PRESENT: Chronic | ICD-10-CM

## 2021-11-24 DIAGNOSIS — Z95.811 LVAD (LEFT VENTRICULAR ASSIST DEVICE) PRESENT: Primary | ICD-10-CM

## 2021-11-24 DIAGNOSIS — Z79.01 ANTICOAGULATION MONITORING, INR RANGE 2-3: ICD-10-CM

## 2021-11-24 LAB
INR PPP: 2.8 (ref 0.8–1.2)
PROTHROMBIN TIME: 27.7 SEC (ref 9–12.5)

## 2021-11-24 PROCEDURE — 93793 ANTICOAG MGMT PT WARFARIN: CPT | Mod: S$GLB,,,

## 2021-11-24 PROCEDURE — 93793 PR ANTICOAGULANT MGMT FOR PT TAKING WARFARIN: ICD-10-PCS | Mod: S$GLB,,,

## 2021-11-24 PROCEDURE — 85610 PROTHROMBIN TIME: CPT | Performed by: INTERNAL MEDICINE

## 2021-11-24 PROCEDURE — 36415 COLL VENOUS BLD VENIPUNCTURE: CPT | Performed by: INTERNAL MEDICINE

## 2021-11-30 ENCOUNTER — PATIENT MESSAGE (OUTPATIENT)
Dept: ENDOCRINOLOGY | Facility: CLINIC | Age: 56
End: 2021-11-30
Payer: COMMERCIAL

## 2021-11-30 DIAGNOSIS — T46.2X1A HYPOTHYROIDISM DUE TO AMIODARONE: Primary | ICD-10-CM

## 2021-11-30 DIAGNOSIS — E03.2 HYPOTHYROIDISM DUE TO AMIODARONE: Primary | ICD-10-CM

## 2021-11-30 RX ORDER — MEXILETINE HYDROCHLORIDE 250 MG/1
250 CAPSULE ORAL EVERY 8 HOURS
Qty: 270 CAPSULE | Refills: 3 | Status: SHIPPED | OUTPATIENT
Start: 2021-11-30 | End: 2021-12-27 | Stop reason: SDUPTHER

## 2021-11-30 RX ORDER — LEVOTHYROXINE SODIUM 112 UG/1
112 TABLET ORAL
Qty: 90 TABLET | Refills: 3 | Status: ON HOLD | OUTPATIENT
Start: 2021-11-30 | End: 2022-09-01 | Stop reason: HOSPADM

## 2021-12-01 ENCOUNTER — LAB VISIT (OUTPATIENT)
Dept: LAB | Facility: HOSPITAL | Age: 56
End: 2021-12-01
Attending: INTERNAL MEDICINE
Payer: COMMERCIAL

## 2021-12-01 ENCOUNTER — ANTI-COAG VISIT (OUTPATIENT)
Dept: CARDIOLOGY | Facility: CLINIC | Age: 56
End: 2021-12-01
Payer: COMMERCIAL

## 2021-12-01 DIAGNOSIS — Z95.811 LVAD (LEFT VENTRICULAR ASSIST DEVICE) PRESENT: Primary | ICD-10-CM

## 2021-12-01 DIAGNOSIS — Z95.811 LVAD (LEFT VENTRICULAR ASSIST DEVICE) PRESENT: Chronic | ICD-10-CM

## 2021-12-01 DIAGNOSIS — Z79.01 ANTICOAGULATION MONITORING, INR RANGE 2-3: ICD-10-CM

## 2021-12-01 LAB
INR PPP: 2.7 (ref 0.8–1.2)
PROTHROMBIN TIME: 27.4 SEC (ref 9–12.5)

## 2021-12-01 PROCEDURE — 36415 COLL VENOUS BLD VENIPUNCTURE: CPT | Performed by: INTERNAL MEDICINE

## 2021-12-01 PROCEDURE — 85610 PROTHROMBIN TIME: CPT | Performed by: INTERNAL MEDICINE

## 2021-12-01 PROCEDURE — 93793 PR ANTICOAGULANT MGMT FOR PT TAKING WARFARIN: ICD-10-PCS | Mod: S$GLB,,,

## 2021-12-01 PROCEDURE — 93793 ANTICOAG MGMT PT WARFARIN: CPT | Mod: S$GLB,,,

## 2021-12-08 ENCOUNTER — PATIENT MESSAGE (OUTPATIENT)
Dept: CARDIOLOGY | Facility: CLINIC | Age: 56
End: 2021-12-08
Payer: COMMERCIAL

## 2021-12-08 ENCOUNTER — OFFICE VISIT (OUTPATIENT)
Dept: PSYCHIATRY | Facility: CLINIC | Age: 56
End: 2021-12-08
Payer: COMMERCIAL

## 2021-12-08 VITALS — BODY MASS INDEX: 36.52 KG/M2 | HEART RATE: 100 BPM | HEIGHT: 73 IN | WEIGHT: 275.56 LBS

## 2021-12-08 DIAGNOSIS — F43.23 ADJUSTMENT DISORDER WITH MIXED ANXIETY AND DEPRESSED MOOD: ICD-10-CM

## 2021-12-08 DIAGNOSIS — G47.00 INSOMNIA, UNSPECIFIED TYPE: ICD-10-CM

## 2021-12-08 DIAGNOSIS — F41.1 GENERALIZED ANXIETY DISORDER: Primary | ICD-10-CM

## 2021-12-08 PROCEDURE — 99999 PR PBB SHADOW E&M-EST. PATIENT-LVL IV: ICD-10-PCS | Mod: PBBFAC,,, | Performed by: PSYCHIATRY & NEUROLOGY

## 2021-12-08 PROCEDURE — 99999 PR PBB SHADOW E&M-EST. PATIENT-LVL IV: CPT | Mod: PBBFAC,,, | Performed by: PSYCHIATRY & NEUROLOGY

## 2021-12-08 PROCEDURE — 90833 PSYTX W PT W E/M 30 MIN: CPT | Mod: S$GLB,,, | Performed by: PSYCHIATRY & NEUROLOGY

## 2021-12-08 PROCEDURE — 99214 PR OFFICE/OUTPT VISIT, EST, LEVL IV, 30-39 MIN: ICD-10-PCS | Mod: S$GLB,,, | Performed by: PSYCHIATRY & NEUROLOGY

## 2021-12-08 PROCEDURE — 90833 PR PSYCHOTHERAPY W/PATIENT W/E&M, 30 MIN (ADD ON): ICD-10-PCS | Mod: S$GLB,,, | Performed by: PSYCHIATRY & NEUROLOGY

## 2021-12-08 PROCEDURE — 99214 OFFICE O/P EST MOD 30 MIN: CPT | Mod: S$GLB,,, | Performed by: PSYCHIATRY & NEUROLOGY

## 2021-12-08 RX ORDER — ALPRAZOLAM 1 MG/1
1 TABLET ORAL DAILY PRN
Qty: 30 TABLET | Refills: 5 | Status: SHIPPED | OUTPATIENT
Start: 2021-12-08 | End: 2022-01-17 | Stop reason: SDUPTHER

## 2021-12-08 RX ORDER — ZOLPIDEM TARTRATE 12.5 MG/1
12.5 TABLET, FILM COATED, EXTENDED RELEASE ORAL NIGHTLY PRN
Qty: 30 TABLET | Refills: 5 | Status: ON HOLD | OUTPATIENT
Start: 2021-12-08 | End: 2022-07-21

## 2021-12-08 RX ORDER — BUSPIRONE HYDROCHLORIDE 5 MG/1
5 TABLET ORAL 3 TIMES DAILY
Qty: 90 TABLET | Refills: 1 | Status: SHIPPED | OUTPATIENT
Start: 2021-12-08 | End: 2022-02-16 | Stop reason: SDUPTHER

## 2021-12-09 ENCOUNTER — ANTI-COAG VISIT (OUTPATIENT)
Dept: CARDIOLOGY | Facility: CLINIC | Age: 56
End: 2021-12-09
Payer: COMMERCIAL

## 2021-12-09 ENCOUNTER — LAB VISIT (OUTPATIENT)
Dept: LAB | Facility: HOSPITAL | Age: 56
End: 2021-12-09
Attending: INTERNAL MEDICINE
Payer: COMMERCIAL

## 2021-12-09 DIAGNOSIS — Z95.811 LVAD (LEFT VENTRICULAR ASSIST DEVICE) PRESENT: Chronic | ICD-10-CM

## 2021-12-09 DIAGNOSIS — Z95.811 LVAD (LEFT VENTRICULAR ASSIST DEVICE) PRESENT: Primary | ICD-10-CM

## 2021-12-09 DIAGNOSIS — Z79.01 ANTICOAGULATION MONITORING, INR RANGE 2-3: ICD-10-CM

## 2021-12-09 LAB
INR PPP: 2.6 (ref 0.8–1.2)
PROTHROMBIN TIME: 26.2 SEC (ref 9–12.5)

## 2021-12-09 PROCEDURE — 85610 PROTHROMBIN TIME: CPT | Performed by: INTERNAL MEDICINE

## 2021-12-09 PROCEDURE — 93793 PR ANTICOAGULANT MGMT FOR PT TAKING WARFARIN: ICD-10-PCS | Mod: S$GLB,,,

## 2021-12-09 PROCEDURE — 36415 COLL VENOUS BLD VENIPUNCTURE: CPT | Performed by: INTERNAL MEDICINE

## 2021-12-09 PROCEDURE — 93793 ANTICOAG MGMT PT WARFARIN: CPT | Mod: S$GLB,,,

## 2021-12-14 ENCOUNTER — CLINICAL SUPPORT (OUTPATIENT)
Dept: CARDIOLOGY | Facility: HOSPITAL | Age: 56
End: 2021-12-14
Payer: COMMERCIAL

## 2021-12-14 DIAGNOSIS — Z95.810 PRESENCE OF AUTOMATIC (IMPLANTABLE) CARDIAC DEFIBRILLATOR: ICD-10-CM

## 2021-12-14 PROCEDURE — 93295 CARDIAC DEVICE CHECK - REMOTE: ICD-10-PCS | Mod: ,,, | Performed by: INTERNAL MEDICINE

## 2021-12-14 PROCEDURE — 93295 DEV INTERROG REMOTE 1/2/MLT: CPT | Mod: ,,, | Performed by: INTERNAL MEDICINE

## 2021-12-14 PROCEDURE — 93296 REM INTERROG EVL PM/IDS: CPT | Performed by: INTERNAL MEDICINE

## 2021-12-15 ENCOUNTER — PATIENT MESSAGE (OUTPATIENT)
Dept: ADMINISTRATIVE | Facility: HOSPITAL | Age: 56
End: 2021-12-15
Payer: COMMERCIAL

## 2021-12-15 ENCOUNTER — LAB VISIT (OUTPATIENT)
Dept: LAB | Facility: HOSPITAL | Age: 56
End: 2021-12-15
Attending: INTERNAL MEDICINE
Payer: COMMERCIAL

## 2021-12-15 DIAGNOSIS — Z95.811 LVAD (LEFT VENTRICULAR ASSIST DEVICE) PRESENT: Chronic | ICD-10-CM

## 2021-12-15 LAB
INR PPP: 3.7 (ref 0.8–1.2)
PROTHROMBIN TIME: 36.6 SEC (ref 9–12.5)

## 2021-12-15 PROCEDURE — 36415 COLL VENOUS BLD VENIPUNCTURE: CPT | Performed by: INTERNAL MEDICINE

## 2021-12-15 PROCEDURE — 85610 PROTHROMBIN TIME: CPT | Performed by: INTERNAL MEDICINE

## 2021-12-16 ENCOUNTER — ANTI-COAG VISIT (OUTPATIENT)
Dept: CARDIOLOGY | Facility: CLINIC | Age: 56
End: 2021-12-16
Payer: COMMERCIAL

## 2021-12-16 DIAGNOSIS — Z95.811 LVAD (LEFT VENTRICULAR ASSIST DEVICE) PRESENT: Primary | ICD-10-CM

## 2021-12-16 DIAGNOSIS — Z79.01 ANTICOAGULATION MONITORING, INR RANGE 2-3: ICD-10-CM

## 2021-12-20 ENCOUNTER — ANTI-COAG VISIT (OUTPATIENT)
Dept: CARDIOLOGY | Facility: CLINIC | Age: 56
End: 2021-12-20
Payer: COMMERCIAL

## 2021-12-20 ENCOUNTER — LAB VISIT (OUTPATIENT)
Dept: LAB | Facility: HOSPITAL | Age: 56
End: 2021-12-20
Attending: INTERNAL MEDICINE
Payer: COMMERCIAL

## 2021-12-20 ENCOUNTER — TELEPHONE (OUTPATIENT)
Dept: CARDIOLOGY | Facility: HOSPITAL | Age: 56
End: 2021-12-20
Payer: COMMERCIAL

## 2021-12-20 DIAGNOSIS — Z95.811 LVAD (LEFT VENTRICULAR ASSIST DEVICE) PRESENT: Primary | ICD-10-CM

## 2021-12-20 DIAGNOSIS — Z79.01 ANTICOAGULATION MONITORING, INR RANGE 2-3: ICD-10-CM

## 2021-12-20 DIAGNOSIS — Z95.811 LVAD (LEFT VENTRICULAR ASSIST DEVICE) PRESENT: ICD-10-CM

## 2021-12-20 LAB
INR PPP: 3.1 (ref 0.8–1.2)
PROTHROMBIN TIME: 31.1 SEC (ref 9–12.5)

## 2021-12-20 PROCEDURE — 93793 ANTICOAG MGMT PT WARFARIN: CPT | Mod: S$GLB,,,

## 2021-12-20 PROCEDURE — 36415 COLL VENOUS BLD VENIPUNCTURE: CPT | Performed by: INTERNAL MEDICINE

## 2021-12-20 PROCEDURE — 85610 PROTHROMBIN TIME: CPT | Performed by: INTERNAL MEDICINE

## 2021-12-20 PROCEDURE — 93793 PR ANTICOAGULANT MGMT FOR PT TAKING WARFARIN: ICD-10-PCS | Mod: S$GLB,,,

## 2021-12-24 ENCOUNTER — PATIENT MESSAGE (OUTPATIENT)
Dept: ELECTROPHYSIOLOGY | Facility: CLINIC | Age: 56
End: 2021-12-24
Payer: COMMERCIAL

## 2021-12-27 RX ORDER — MEXILETINE HYDROCHLORIDE 250 MG/1
250 CAPSULE ORAL EVERY 8 HOURS
Qty: 270 CAPSULE | Refills: 3 | Status: ON HOLD | OUTPATIENT
Start: 2021-12-27 | End: 2022-09-01 | Stop reason: SDUPTHER

## 2021-12-29 ENCOUNTER — LAB VISIT (OUTPATIENT)
Dept: LAB | Facility: HOSPITAL | Age: 56
End: 2021-12-29
Attending: INTERNAL MEDICINE
Payer: COMMERCIAL

## 2021-12-29 ENCOUNTER — ANTI-COAG VISIT (OUTPATIENT)
Dept: CARDIOLOGY | Facility: CLINIC | Age: 56
End: 2021-12-29
Payer: COMMERCIAL

## 2021-12-29 DIAGNOSIS — Z79.01 ANTICOAGULATION MONITORING, INR RANGE 2-3: ICD-10-CM

## 2021-12-29 DIAGNOSIS — Z95.811 LVAD (LEFT VENTRICULAR ASSIST DEVICE) PRESENT: Primary | ICD-10-CM

## 2021-12-29 DIAGNOSIS — Z95.811 LVAD (LEFT VENTRICULAR ASSIST DEVICE) PRESENT: ICD-10-CM

## 2021-12-29 LAB
INR PPP: 2.8 (ref 0.8–1.2)
PROTHROMBIN TIME: 28.1 SEC (ref 9–12.5)

## 2021-12-29 PROCEDURE — 93793 PR ANTICOAGULANT MGMT FOR PT TAKING WARFARIN: ICD-10-PCS | Mod: S$GLB,,,

## 2021-12-29 PROCEDURE — 93793 ANTICOAG MGMT PT WARFARIN: CPT | Mod: S$GLB,,,

## 2021-12-29 PROCEDURE — 85610 PROTHROMBIN TIME: CPT | Performed by: INTERNAL MEDICINE

## 2021-12-29 PROCEDURE — 36415 COLL VENOUS BLD VENIPUNCTURE: CPT | Performed by: INTERNAL MEDICINE

## 2022-01-03 ENCOUNTER — LAB VISIT (OUTPATIENT)
Dept: LAB | Facility: HOSPITAL | Age: 57
End: 2022-01-03
Attending: INTERNAL MEDICINE
Payer: COMMERCIAL

## 2022-01-03 DIAGNOSIS — Z95.811 LVAD (LEFT VENTRICULAR ASSIST DEVICE) PRESENT: Chronic | ICD-10-CM

## 2022-01-03 LAB
INR PPP: 2.8 (ref 0.8–1.2)
PROTHROMBIN TIME: 28 SEC (ref 9–12.5)

## 2022-01-03 PROCEDURE — 36415 COLL VENOUS BLD VENIPUNCTURE: CPT | Performed by: INTERNAL MEDICINE

## 2022-01-03 PROCEDURE — 85610 PROTHROMBIN TIME: CPT | Performed by: INTERNAL MEDICINE

## 2022-01-04 ENCOUNTER — ANTI-COAG VISIT (OUTPATIENT)
Dept: CARDIOLOGY | Facility: CLINIC | Age: 57
End: 2022-01-04
Payer: COMMERCIAL

## 2022-01-04 DIAGNOSIS — Z79.01 ANTICOAGULATION MONITORING, INR RANGE 2-3: ICD-10-CM

## 2022-01-04 DIAGNOSIS — Z95.811 LVAD (LEFT VENTRICULAR ASSIST DEVICE) PRESENT: Primary | ICD-10-CM

## 2022-01-04 PROCEDURE — 93793 ANTICOAG MGMT PT WARFARIN: CPT | Mod: S$GLB,,,

## 2022-01-04 PROCEDURE — 93793 PR ANTICOAGULANT MGMT FOR PT TAKING WARFARIN: ICD-10-PCS | Mod: S$GLB,,,

## 2022-01-10 ENCOUNTER — PATIENT MESSAGE (OUTPATIENT)
Dept: CARDIOLOGY | Facility: CLINIC | Age: 57
End: 2022-01-10
Payer: COMMERCIAL

## 2022-01-13 ENCOUNTER — PATIENT MESSAGE (OUTPATIENT)
Dept: CARDIOLOGY | Facility: CLINIC | Age: 57
End: 2022-01-13
Payer: COMMERCIAL

## 2022-01-18 ENCOUNTER — TELEPHONE (OUTPATIENT)
Dept: TRANSPLANT | Facility: CLINIC | Age: 57
End: 2022-01-18
Payer: COMMERCIAL

## 2022-01-18 NOTE — TELEPHONE ENCOUNTER
Left patient a message to bring all 8 of his 14 V batteries to be replaced for his clinic visit on 1/19/21

## 2022-01-19 ENCOUNTER — OFFICE VISIT (OUTPATIENT)
Dept: TRANSPLANT | Facility: CLINIC | Age: 57
End: 2022-01-19
Payer: COMMERCIAL

## 2022-01-19 ENCOUNTER — LAB VISIT (OUTPATIENT)
Dept: LAB | Facility: HOSPITAL | Age: 57
End: 2022-01-19
Attending: INTERNAL MEDICINE
Payer: COMMERCIAL

## 2022-01-19 ENCOUNTER — ANTI-COAG VISIT (OUTPATIENT)
Dept: CARDIOLOGY | Facility: CLINIC | Age: 57
End: 2022-01-19
Payer: COMMERCIAL

## 2022-01-19 ENCOUNTER — CLINICAL SUPPORT (OUTPATIENT)
Dept: TRANSPLANT | Facility: CLINIC | Age: 57
End: 2022-01-19
Payer: COMMERCIAL

## 2022-01-19 ENCOUNTER — PATIENT MESSAGE (OUTPATIENT)
Dept: ENDOCRINOLOGY | Facility: CLINIC | Age: 57
End: 2022-01-19
Payer: COMMERCIAL

## 2022-01-19 VITALS — WEIGHT: 268 LBS | BODY MASS INDEX: 35.52 KG/M2 | TEMPERATURE: 99 F | HEIGHT: 73 IN | SYSTOLIC BLOOD PRESSURE: 86 MMHG

## 2022-01-19 DIAGNOSIS — Z95.811 LVAD (LEFT VENTRICULAR ASSIST DEVICE) PRESENT: Chronic | ICD-10-CM

## 2022-01-19 DIAGNOSIS — I50.42 CHRONIC COMBINED SYSTOLIC AND DIASTOLIC HEART FAILURE: ICD-10-CM

## 2022-01-19 DIAGNOSIS — E03.2 HYPOTHYROIDISM DUE TO AMIODARONE: Primary | ICD-10-CM

## 2022-01-19 DIAGNOSIS — Z79.01 ANTICOAGULATION MONITORING, INR RANGE 2-3: ICD-10-CM

## 2022-01-19 DIAGNOSIS — G47.33 OSA (OBSTRUCTIVE SLEEP APNEA): ICD-10-CM

## 2022-01-19 DIAGNOSIS — I42.0 DILATED CARDIOMYOPATHY: ICD-10-CM

## 2022-01-19 DIAGNOSIS — T46.2X1A HYPOTHYROIDISM DUE TO AMIODARONE: ICD-10-CM

## 2022-01-19 DIAGNOSIS — E03.2 HYPOTHYROIDISM DUE TO AMIODARONE: ICD-10-CM

## 2022-01-19 DIAGNOSIS — Z95.811 LVAD (LEFT VENTRICULAR ASSIST DEVICE) PRESENT: Primary | ICD-10-CM

## 2022-01-19 DIAGNOSIS — I10 ESSENTIAL HYPERTENSION: ICD-10-CM

## 2022-01-19 DIAGNOSIS — T46.2X1A HYPOTHYROIDISM DUE TO AMIODARONE: Primary | ICD-10-CM

## 2022-01-19 DIAGNOSIS — Z95.811 LVAD (LEFT VENTRICULAR ASSIST DEVICE) PRESENT: Primary | Chronic | ICD-10-CM

## 2022-01-19 LAB
ALBUMIN SERPL BCP-MCNC: 4.2 G/DL (ref 3.5–5.2)
ALP SERPL-CCNC: 140 U/L (ref 55–135)
ALT SERPL W/O P-5'-P-CCNC: 22 U/L (ref 10–44)
ANION GAP SERPL CALC-SCNC: 13 MMOL/L (ref 8–16)
AST SERPL-CCNC: 26 U/L (ref 10–40)
BASOPHILS # BLD AUTO: 0.02 K/UL (ref 0–0.2)
BASOPHILS NFR BLD: 0.4 % (ref 0–1.9)
BILIRUB DIRECT SERPL-MCNC: 0.3 MG/DL (ref 0.1–0.3)
BILIRUB SERPL-MCNC: 0.7 MG/DL (ref 0.1–1)
BNP SERPL-MCNC: 197 PG/ML (ref 0–99)
BUN SERPL-MCNC: 19 MG/DL (ref 6–20)
CALCIUM SERPL-MCNC: 9.6 MG/DL (ref 8.7–10.5)
CHLORIDE SERPL-SCNC: 101 MMOL/L (ref 95–110)
CO2 SERPL-SCNC: 24 MMOL/L (ref 23–29)
CREAT SERPL-MCNC: 1.9 MG/DL (ref 0.5–1.4)
CRP SERPL-MCNC: 22 MG/L (ref 0–8.2)
DIFFERENTIAL METHOD: ABNORMAL
EOSINOPHIL # BLD AUTO: 0.1 K/UL (ref 0–0.5)
EOSINOPHIL NFR BLD: 1.3 % (ref 0–8)
ERYTHROCYTE [DISTWIDTH] IN BLOOD BY AUTOMATED COUNT: 15.4 % (ref 11.5–14.5)
EST. GFR  (AFRICAN AMERICAN): 44.9 ML/MIN/1.73 M^2
EST. GFR  (NON AFRICAN AMERICAN): 38.8 ML/MIN/1.73 M^2
GLUCOSE SERPL-MCNC: 87 MG/DL (ref 70–110)
HCT VFR BLD AUTO: 47.8 % (ref 40–54)
HGB BLD-MCNC: 14.4 G/DL (ref 14–18)
IMM GRANULOCYTES # BLD AUTO: 0.02 K/UL (ref 0–0.04)
IMM GRANULOCYTES NFR BLD AUTO: 0.4 % (ref 0–0.5)
INR PPP: 2.9 (ref 0.8–1.2)
LDH SERPL L TO P-CCNC: 337 U/L (ref 110–260)
LYMPHOCYTES # BLD AUTO: 1.1 K/UL (ref 1–4.8)
LYMPHOCYTES NFR BLD: 23 % (ref 18–48)
MAGNESIUM SERPL-MCNC: 2 MG/DL (ref 1.6–2.6)
MCH RBC QN AUTO: 27 PG (ref 27–31)
MCHC RBC AUTO-ENTMCNC: 30.1 G/DL (ref 32–36)
MCV RBC AUTO: 90 FL (ref 82–98)
MONOCYTES # BLD AUTO: 0.6 K/UL (ref 0.3–1)
MONOCYTES NFR BLD: 12.9 % (ref 4–15)
NEUTROPHILS # BLD AUTO: 3 K/UL (ref 1.8–7.7)
NEUTROPHILS NFR BLD: 62 % (ref 38–73)
NRBC BLD-RTO: 0 /100 WBC
PHOSPHATE SERPL-MCNC: 2.7 MG/DL (ref 2.7–4.5)
PLATELET # BLD AUTO: 230 K/UL (ref 150–450)
PMV BLD AUTO: 10.9 FL (ref 9.2–12.9)
POTASSIUM SERPL-SCNC: 3.7 MMOL/L (ref 3.5–5.1)
PREALB SERPL-MCNC: 27 MG/DL (ref 20–43)
PROT SERPL-MCNC: 8.9 G/DL (ref 6–8.4)
PROTHROMBIN TIME: 30.1 SEC (ref 9–12.5)
RBC # BLD AUTO: 5.34 M/UL (ref 4.6–6.2)
SODIUM SERPL-SCNC: 138 MMOL/L (ref 136–145)
T4 FREE SERPL-MCNC: 1.25 NG/DL (ref 0.71–1.51)
TSH SERPL DL<=0.005 MIU/L-ACNC: 6.29 UIU/ML (ref 0.4–4)
WBC # BLD AUTO: 4.79 K/UL (ref 3.9–12.7)

## 2022-01-19 PROCEDURE — 85610 PROTHROMBIN TIME: CPT | Performed by: INTERNAL MEDICINE

## 2022-01-19 PROCEDURE — 85025 COMPLETE CBC W/AUTO DIFF WBC: CPT | Performed by: INTERNAL MEDICINE

## 2022-01-19 PROCEDURE — 36415 COLL VENOUS BLD VENIPUNCTURE: CPT | Performed by: INTERNAL MEDICINE

## 2022-01-19 PROCEDURE — 86140 C-REACTIVE PROTEIN: CPT | Performed by: INTERNAL MEDICINE

## 2022-01-19 PROCEDURE — 93750 INTERROGATION VAD IN PERSON: CPT | Mod: S$GLB,,, | Performed by: INTERNAL MEDICINE

## 2022-01-19 PROCEDURE — 84100 ASSAY OF PHOSPHORUS: CPT | Performed by: INTERNAL MEDICINE

## 2022-01-19 PROCEDURE — 83880 ASSAY OF NATRIURETIC PEPTIDE: CPT | Performed by: INTERNAL MEDICINE

## 2022-01-19 PROCEDURE — 99999 PR PBB SHADOW E&M-EST. PATIENT-LVL II: CPT | Mod: PBBFAC,,,

## 2022-01-19 PROCEDURE — 99999 PR PBB SHADOW E&M-EST. PATIENT-LVL II: ICD-10-PCS | Mod: PBBFAC,,,

## 2022-01-19 PROCEDURE — 84443 ASSAY THYROID STIM HORMONE: CPT | Performed by: INTERNAL MEDICINE

## 2022-01-19 PROCEDURE — 82248 BILIRUBIN DIRECT: CPT | Performed by: INTERNAL MEDICINE

## 2022-01-19 PROCEDURE — 83615 LACTATE (LD) (LDH) ENZYME: CPT | Performed by: INTERNAL MEDICINE

## 2022-01-19 PROCEDURE — 99999 PR PBB SHADOW E&M-EST. PATIENT-LVL IV: CPT | Mod: PBBFAC,,, | Performed by: INTERNAL MEDICINE

## 2022-01-19 PROCEDURE — 84134 ASSAY OF PREALBUMIN: CPT | Performed by: INTERNAL MEDICINE

## 2022-01-19 PROCEDURE — 99999 PR PBB SHADOW E&M-EST. PATIENT-LVL IV: ICD-10-PCS | Mod: PBBFAC,,, | Performed by: INTERNAL MEDICINE

## 2022-01-19 PROCEDURE — 80053 COMPREHEN METABOLIC PANEL: CPT | Performed by: INTERNAL MEDICINE

## 2022-01-19 PROCEDURE — 84439 ASSAY OF FREE THYROXINE: CPT | Performed by: INTERNAL MEDICINE

## 2022-01-19 PROCEDURE — 93750 OP LVAD INTERROGATION: ICD-10-PCS | Mod: S$GLB,,, | Performed by: INTERNAL MEDICINE

## 2022-01-19 PROCEDURE — 83735 ASSAY OF MAGNESIUM: CPT | Performed by: INTERNAL MEDICINE

## 2022-01-19 PROCEDURE — 99214 OFFICE O/P EST MOD 30 MIN: CPT | Mod: S$GLB,,, | Performed by: INTERNAL MEDICINE

## 2022-01-19 PROCEDURE — 99214 PR OFFICE/OUTPT VISIT, EST, LEVL IV, 30-39 MIN: ICD-10-PCS | Mod: S$GLB,,, | Performed by: INTERNAL MEDICINE

## 2022-01-19 RX ORDER — ASPIRIN 81 MG/1
81 TABLET ORAL DAILY
Qty: 30 TABLET | Refills: 11 | Status: SHIPPED | OUTPATIENT
Start: 2022-01-19 | End: 2022-09-22 | Stop reason: SDUPTHER

## 2022-01-19 RX ORDER — CARVEDILOL 3.12 MG/1
3.12 TABLET ORAL 2 TIMES DAILY WITH MEALS
Qty: 60 TABLET | Refills: 11 | Status: SHIPPED | OUTPATIENT
Start: 2022-01-19 | End: 2022-05-19 | Stop reason: ALTCHOICE

## 2022-01-19 RX ORDER — AMIODARONE HYDROCHLORIDE 400 MG/1
400 TABLET ORAL DAILY
Qty: 30 TABLET | Refills: 11 | Status: SHIPPED | OUTPATIENT
Start: 2022-01-19 | End: 2022-04-13

## 2022-01-19 NOTE — PROGRESS NOTES
Subjective:   Patient ID:  Tim Richards is a 56 y.o. male who presents for LVAD followup visit.    Implant Date: 3/8/2018     Heartmate II RPM 9800  3/9/18 outflow graft changed    TXP SACHI INTERROGATIONS 1/19/2022   Type HeartMate II   Flow 5.6   Speed 9800   PI 2.6   Power (Jackson) 6.5   LSL 9400   Pulsatility No Pulse       HPI   Mr. Richards is a very pleasant 55 yo black male with stage D HFrEF, DCM, HBP, CHF stage D s/p HM 2 who comes for his regular VAD visit. Doing well.  Reports NYHA class II symptoms.  VAD speed is at 9800rpm. Have not seen him in a while, he looks great, for me weight is down (but BMI not less than 35 yet). Patient stopped cardura and stopped guanfacine last week. Has been feeling better since doing this, less palpitations per patient. Also had synthroid increased by ED. Also had amio dropped to 200mg and added mexilitine due to increased frequency of ICD shocks. Feels he is doing better overall now.     Review of Systems   Constitutional: Positive for weight loss. Negative for chills, decreased appetite, diaphoresis, fever, malaise/fatigue and weight gain.   HENT: Negative for congestion.    Eyes: Negative for blurred vision and visual disturbance.   Cardiovascular: Positive for dyspnea on exertion. Negative for chest pain, irregular heartbeat, leg swelling, near-syncope, orthopnea, palpitations, paroxysmal nocturnal dyspnea and syncope.   Respiratory: Negative for cough, shortness of breath, sleep disturbances due to breathing, snoring and wheezing.    Hematologic/Lymphatic: Negative for bleeding problem. Does not bruise/bleed easily.   Skin: Negative for poor wound healing and rash.   Musculoskeletal: Negative for arthritis, joint pain and muscle weakness.   Gastrointestinal: Negative for bloating, abdominal pain, anorexia, constipation, diarrhea, hematemesis, hematochezia, melena, nausea and vomiting.   Genitourinary: Negative for frequency and urgency.   Neurological: Negative for  "difficulty with concentration, excessive daytime sleepiness, dizziness, headaches, light-headedness and weakness.   Psychiatric/Behavioral: Negative for depression. The patient does not have insomnia and is not nervous/anxious.        Objective:   Blood pressure (!) 86/0, temperature 98.9 °F (37.2 °C), temperature source Oral, height 6' 1" (1.854 m), weight 121.6 kg (268 lb).body mass index is 35.36 kg/m².    Physical Exam  Vitals reviewed.   Constitutional:       Appearance: He is well-developed.      Comments:      HENT:      Head: Normocephalic.   Neck:      Vascular: No carotid bruit or JVD.   Cardiovascular:      Rate and Rhythm: Regular rhythm.      Pulses: Normal pulses.      Heart sounds: Normal heart sounds. No murmur heard.       Comments: Smooth VAD hum. DLES is "1"  Pulmonary:      Effort: Pulmonary effort is normal.      Breath sounds: Normal breath sounds.   Abdominal:      General: Bowel sounds are normal.      Palpations: Abdomen is soft.   Skin:     General: Skin is warm.   Neurological:      Mental Status: He is alert.         Lab Results   Component Value Date    WBC 4.79 01/19/2022    HGB 14.4 01/19/2022    HCT 47.8 01/19/2022    MCV 90 01/19/2022     01/19/2022    CO2 24 01/19/2022    CREATININE 1.9 (H) 01/19/2022    CALCIUM 9.6 01/19/2022    ALBUMIN 4.2 01/19/2022    AST 26 01/19/2022     (H) 01/19/2022    ALT 22 01/19/2022     (H) 01/19/2022       Lab Results   Component Value Date    INR 2.9 (H) 01/19/2022    INR 2.8 (H) 01/03/2022    INR 2.8 (H) 12/29/2021     BNP   Date Value Ref Range Status   01/19/2022 197 (H) 0 - 99 pg/mL Final     Comment:     Values of less than 100 pg/ml are consistent with non-CHF populations.   11/11/2021 131 (H) 0 - 99 pg/mL Final     Comment:     Values of less than 100 pg/ml are consistent with non-CHF populations.   09/16/2021 112 (H) 0 - 99 pg/mL Final     Comment:     Values of less than 100 pg/ml are consistent with non-CHF populations. "     LD   Date Value Ref Range Status   01/19/2022 337 (H) 110 - 260 U/L Final     Comment:     Results are increased in hemolyzed samples.   11/11/2021 388 (H) 110 - 260 U/L Final     Comment:     Results are increased in hemolyzed samples.   09/16/2021 304 (H) 110 - 260 U/L Final     Comment:     Results are increased in hemolyzed samples.       Assessment:      1. LVAD (left ventricular assist device) present    2. Dilated cardiomyopathy    3. Essential hypertension    4. Chronic combined systolic and diastolic heart failure    5. Anticoagulation monitoring, INR range 2-3    6. CHICHI (obstructive sleep apnea)      Plan:   Agree with holding off on cardura/tenex.   Patient trepidatious about starting BB however will give carvedilol a shot at request of EP, since he has need for blood pressure control as well. If he doesn't like this, will switch to toprol XL after a week or two of coreg.    Continue current regimen otherwise. Encouraged him to continue to work on weight loss/diet/exercise.   NYHA FC II.   Any VAD management kits dispensed today medically necessary.  Recommend 2 gram sodium restriction and 1500cc fluid restriction.  Encourage physical activity with graded exercise program.  Requested patient to weigh themselves daily, and to notify us if their weight increases by more than 3 lbs in 1 day or 5 lbs in 1 week.   RTC 2-3 months.     Listed for transplant: No. Implanted as DT    UNOS Patient Status  Functional Status: 90% - Able to carry on normal activity: minor symptoms of disease  Physical Capacity: No Limitations  Working for Income: Unknown

## 2022-01-19 NOTE — LETTER
January 19, 2022        Nader Chiu  [address] [facility] [address]  Phone: [phone]  Fax: [phone]     Nader Chiu  [address]  [address]  [place] [address]  Phone: [phone]  Fax: [phone]             [id number]  [address]  [facility] [address]  Phone: [phone]   Patient: Tim Richards   MR Number: [id number]   YOB: 1965   Date of Visit: 1/19/2022       Dear Dr. Nader Chiu, Nader Chiu    Thank you for referring Tim Richards to me for evaluation. Attached you will find relevant portions of my assessment and plan of care.    If you have questions, please do not hesitate to call me. I look forward to following Tim Richards along with you.    Sincerely,    Amy Rhodes MD    Enclosure    If you would like to receive this communication electronically, please contact [email or web address] or [phone] to request [employer] access.    [employer] is a tool which provides read-only access to select patient information with whom you have a relationship. Its easy to use and provides real time access to review your patients record including encounter summaries, notes, results, and demographic information.    If you feel you have received this communication in error or would no longer like to receive these types of communications, please e-mail [email or web address]

## 2022-01-19 NOTE — PROCEDURES
TXP SACHI INTERROGATIONS 1/19/2022 11/11/2021 9/16/2021 7/7/2021 5/5/2021 3/25/2021 2/25/2021   Type HeartMate II HeartMate II HeartMate II HeartMate II HeartMate II HeartMate II -   Flow 5.6 7.2 4.4 4.1 4.5 5.1 -   Speed 9800 9800 9800 9800 9800 9800 -   PI 2.6 2.3 3.0 2.7 3.1 3.2 -   Power (Jackson) 6.5 7.4 5.9 5.7 6.2 6.3 -   LSL 9400 9400 9400 8800 9400 9400 -   Pulsatility No Pulse No Pulse No Pulse - No Pulse Intermittent pulse Intermittent pulse   }

## 2022-01-19 NOTE — PATIENT INSTRUCTIONS
Start carvedilol (coreg) 3.125mg twice a day.     Will feel a little fatigued for a couple of days then should get better.

## 2022-01-19 NOTE — PROGRESS NOTES
Date of Implant with Heartmate II LVAD: 3/20/18    PATIENT ARRIVED IN CLINIC:  Ambulatory   Accompanied by: self    Vitals  Temperature, oral:   Temp Readings from Last 1 Encounters:   01/19/22 98.9 °F (37.2 °C) (Oral)     Blood Pressure:   BP Readings from Last 3 Encounters:   01/19/22 (!) 86/0   11/11/21 (!) 78/0   11/11/21 (!) 78/0        VAD Interrogation:  TXP SACHI INTERROGATIONS 1/19/2022 11/11/2021 9/16/2021   Type HeartMate II HeartMate II HeartMate II   Flow 5.6 7.2 4.4   Speed 9800 9800 9800   PI 2.6 2.3 3.0   Power (Jackson) 6.5 7.4 5.9   LSL 9400 9400 9400   Pulsatility No Pulse No Pulse No Pulse       History Log: [id number]  Problems / Issues / Alarms with VAD if any: None noted  VAD Sounds: HM2 Smooth      HCT:   Lab Results   Component Value Date    HCT 47.8 01/19/2022    HCT 38 07/10/2020       Complaints/reason for visit today: routine    VAD Binder With Patient: no   Reviewed VAD Numbers In Binder: no  Enrolled in Care Companion: no    Equipment:  Emergency Equipment With Patient: yes   Any Equipment Issues: None noted   It is medically necessary to ensure patient has properly functioning equipment and wearables to prevent infection, injury or death to patient.     DLES Assessment:  Appearance Of Driveline: 1  Antibiotics: NO  Velour: no  Manual & Visual Inspection Of Driveline: No kinks or tears noted  Stabilization Device In Use: yes, sun securement device      Patient MyChart Questionnaire:        Assessment:   PAIN: NO  Complaints Of Nausea / Vomiting: None noted    Appearance and Frequency Of Stools: normal and formed without blood & daily  Color Of Urine: clear/yellow  Coping/Depression/Anxiety: coping okay and anxious  Sleep Habits: 6 hrs /night  Sleep Aids: None noted  Showering: Yes, reminded to change dressing immediately after drying off  Activity/Exercise: nothin   Driving: Yes. Reminded to pull over should there be an alarm before looking down at controller.    DLES Dressing Care:    Frequency of Dressing Changes: twice per week & daily kit  Pt In Need Of Management Kits?:no   It is medically necessary to have VAD management kits in order to prevent infection or to assist in the healing of an infected DLES.    Labs:    Chemistry        Component Value Date/Time     01/19/2022 1224    K 3.7 01/19/2022 1224     01/19/2022 1224    CO2 24 01/19/2022 1224    BUN 19 01/19/2022 1224    CREATININE 1.9 (H) 01/19/2022 1224    GLU 87 01/19/2022 1224        Component Value Date/Time    CALCIUM 9.6 01/19/2022 1224    ALKPHOS 140 (H) 01/19/2022 1224    AST 26 01/19/2022 1224    ALT 22 01/19/2022 1224    BILITOT 0.7 01/19/2022 1224    ESTGFRAFRICA 44.9 (A) 01/19/2022 1224    EGFRNONAA 38.8 (A) 01/19/2022 1224            Magnesium   Date Value Ref Range Status   01/19/2022 2.0 1.6 - 2.6 mg/dL Final       Lab Results   Component Value Date    WBC 4.79 01/19/2022    HGB 14.4 01/19/2022    HCT 47.8 01/19/2022    MCV 90 01/19/2022     01/19/2022       Lab Results   Component Value Date    INR 2.9 (H) 01/19/2022    INR 2.8 (H) 01/03/2022    INR 2.8 (H) 12/29/2021       BNP   Date Value Ref Range Status   01/19/2022 197 (H) 0 - 99 pg/mL Final     Comment:     Values of less than 100 pg/ml are consistent with non-CHF populations.   11/11/2021 131 (H) 0 - 99 pg/mL Final     Comment:     Values of less than 100 pg/ml are consistent with non-CHF populations.   09/16/2021 112 (H) 0 - 99 pg/mL Final     Comment:     Values of less than 100 pg/ml are consistent with non-CHF populations.       LD   Date Value Ref Range Status   01/19/2022 337 (H) 110 - 260 U/L Final     Comment:     Results are increased in hemolyzed samples.   11/11/2021 388 (H) 110 - 260 U/L Final     Comment:     Results are increased in hemolyzed samples.   09/16/2021 304 (H) 110 - 260 U/L Final     Comment:     Results are increased in hemolyzed samples.       Labs reviewed with patient: YES      Patient Satisfaction Survey  completed per patient: No  (explained about signature and box to check)  Medication reconciliation: per MA.  Medication Detail updated today: yes  Coumadin Managed by: [facility]    Education: Reviewed driveline care, emergency procedures, how to change the controller, alarms with patient, as well as discussed how to page the VAD coordinator in case of an emergency.   Covid - 19 education: Reminded patient/caregiver to check temperature daily and call us if it is > 99.0.  Reminded them  to stay 6 feet away from other people, wash hands frequently, don't touch your face and stay home except to get labs, medications, and appts.    Covid Vaccine: Pt informed that we are encouraging all VAD patients to receive the COVID vaccine.  Informed pt that they can take tylenol but should avoid other NSAIDs.      Plans/Needs: Routine f/u. Pt has had ICD shocks since last visit, EP is following, has appt 1/21. Doppler today 86. Pt reports stopping Cardura and Tenex on his own. Dr. Rhodes added Coreg 3.15mg BID, stay off of cardura and tenex. If pt unable to tolerate Coreg, can switch to Toprol. RTC 2 months.      Hurricane Season: No

## 2022-01-21 ENCOUNTER — OFFICE VISIT (OUTPATIENT)
Dept: ELECTROPHYSIOLOGY | Facility: CLINIC | Age: 57
End: 2022-01-21
Payer: COMMERCIAL

## 2022-01-21 ENCOUNTER — HOSPITAL ENCOUNTER (OUTPATIENT)
Dept: CARDIOLOGY | Facility: CLINIC | Age: 57
Discharge: HOME | End: 2022-01-21
Payer: COMMERCIAL

## 2022-01-21 ENCOUNTER — CLINICAL SUPPORT (OUTPATIENT)
Dept: CARDIOLOGY | Facility: HOSPITAL | Age: 57
End: 2022-01-21
Attending: INTERNAL MEDICINE
Payer: COMMERCIAL

## 2022-01-21 VITALS — HEART RATE: 88 BPM | HEIGHT: 73 IN | BODY MASS INDEX: 35.35 KG/M2 | WEIGHT: 266.75 LBS

## 2022-01-21 DIAGNOSIS — I50.42 CHRONIC COMBINED SYSTOLIC AND DIASTOLIC HEART FAILURE: ICD-10-CM

## 2022-01-21 DIAGNOSIS — Z95.811 LVAD (LEFT VENTRICULAR ASSIST DEVICE) PRESENT: Chronic | ICD-10-CM

## 2022-01-21 DIAGNOSIS — I49.8 OTHER SPECIFIED CARDIAC ARRHYTHMIAS: ICD-10-CM

## 2022-01-21 DIAGNOSIS — I10 ESSENTIAL HYPERTENSION: ICD-10-CM

## 2022-01-21 DIAGNOSIS — I47.20 VT (VENTRICULAR TACHYCARDIA): Chronic | ICD-10-CM

## 2022-01-21 DIAGNOSIS — I13.0 HYPERTENSIVE CARDIOVASCULAR-RENAL DISEASE, STAGE 1-4 OR UNSPECIFIED CHRONIC KIDNEY DISEASE, WITH HEART FAILURE: ICD-10-CM

## 2022-01-21 DIAGNOSIS — I10 PRIMARY HYPERTENSION: Primary | Chronic | ICD-10-CM

## 2022-01-21 DIAGNOSIS — I42.0 DILATED CARDIOMYOPATHY: ICD-10-CM

## 2022-01-21 DIAGNOSIS — Z45.02 ICD (IMPLANTABLE CARDIOVERTER-DEFIBRILLATOR) DISCHARGE: ICD-10-CM

## 2022-01-21 PROCEDURE — 93010 ELECTROCARDIOGRAM REPORT: CPT | Mod: S$GLB,,, | Performed by: INTERNAL MEDICINE

## 2022-01-21 PROCEDURE — 99214 OFFICE O/P EST MOD 30 MIN: CPT | Mod: S$GLB,,, | Performed by: INTERNAL MEDICINE

## 2022-01-21 PROCEDURE — 99214 PR OFFICE/OUTPT VISIT, EST, LEVL IV, 30-39 MIN: ICD-10-PCS | Mod: S$GLB,,, | Performed by: INTERNAL MEDICINE

## 2022-01-21 PROCEDURE — 99999 PR PBB SHADOW E&M-EST. PATIENT-LVL IV: CPT | Mod: PBBFAC,,, | Performed by: INTERNAL MEDICINE

## 2022-01-21 PROCEDURE — 93010 RHYTHM STRIP: ICD-10-PCS | Mod: S$GLB,,, | Performed by: INTERNAL MEDICINE

## 2022-01-21 PROCEDURE — 99999 PR PBB SHADOW E&M-EST. PATIENT-LVL IV: ICD-10-PCS | Mod: PBBFAC,,, | Performed by: INTERNAL MEDICINE

## 2022-01-21 PROCEDURE — 93005 RHYTHM STRIP: ICD-10-PCS | Mod: S$GLB,,, | Performed by: INTERNAL MEDICINE

## 2022-01-21 PROCEDURE — 93005 ELECTROCARDIOGRAM TRACING: CPT | Mod: S$GLB,,, | Performed by: INTERNAL MEDICINE

## 2022-01-21 NOTE — PROGRESS NOTES
Electrophysiology Clinic Note   Reason for visit: Device management/ ICD reimplant     HPI:   Tim Richards is a 56 y.o. male with a significant cardiac history that includes non ischemic cardiomyopathy with severely reduced LVEF s/p ICD ( )  and  s/p HM 2 implant (3/2018), history of VT and VF    He had a generator change and ICD lead revision on 2020. He was seen in clinic for follow up and was noted to have drainage from the ICD site. He was subsequently admitted in the inpatient setting for management of  ICD pocket infection. He underwent success device extraction on 7/10/2020 with residual left chest wall pocket hematoma. He was transfused with RBC but did well and was discharged home on IV antibiotics and a lifevest with plans for reimplant after course of antibiotics prescribed by ID.     SICD implanted 2020. Had shocks for VT 2021. We added (later increased) mexiletine.  He's been feeling great.    My interpretation of today's ECG is NSR        Past Medical History:   Diagnosis Date    Anticoagulant long-term use     CHF (congestive heart failure)     Dilated cardiomyopathy 1/10/2018    Disorder of kidney and ureter     CKD    Encounter for blood transfusion     Gout     HTN (hypertension)     Hx of psychiatric care     Psychiatric problem     Thyroid disease     Ventricular tachycardia (paroxysmal)         Social History     Socioeconomic History    Marital status:     Number of children: 3   Occupational History     Employer: [employer]    Tobacco Use    Smoking status: Former Smoker     Packs/day: 1.00     Years: 31.00     Pack years: 31.00     Types: Cigarettes     Quit date: 2018     Years since quittin.0    Smokeless tobacco: Never Used    Tobacco comment: pt is quiting on his own - pt stated not qualified for program;  pt  quit on his own   Substance and Sexual Activity    Alcohol use: No     Alcohol/week: 0.0 standard drinks      Comment: quit    Drug use: No    Sexual activity: Yes     Partners: Female     Birth control/protection: None     Comment: 10/5/17  with same partner 7 years    Social History Narrative    Disabled     Social Determinants of Health     Financial Resource Strain: Low Risk     Difficulty of Paying Living Expenses: Not very hard   Food Insecurity: No Food Insecurity    Worried About Running Out of Food in the Last Year: Never true    Ran Out of Food in the Last Year: Never true   Transportation Needs: No Transportation Needs    Lack of Transportation (Medical): No    Lack of Transportation (Non-Medical): No   Physical Activity: Insufficiently Active    Days of Exercise per Week: 3 days    Minutes of Exercise per Session: 10 min   Stress: Stress Concern Present    Feeling of Stress : Very much   Social Connections: Unknown    Frequency of Communication with Friends and Family: More than three times a week    Frequency of Social Gatherings with Friends and Family: More than three times a week    Active Member of Clubs or Organizations: No    Attends Club or Organization Meetings: Never    Marital Status:         Family History   Problem Relation Age of Onset    Hypertension Father     Diabetes Father     Coronary artery disease Father     Heart disease Father         CHF    No Known Problems Mother     Cancer Sister 54        breast CA    No Known Problems Brother     Anxiety disorder Neg Hx     Depression Neg Hx     Dementia Neg Hx     Bipolar disorder Neg Hx     Suicide Neg Hx         Current Outpatient Medications   Medication Sig Dispense Refill    allopurinoL (ZYLOPRIM) 100 MG tablet TAKE 1 TABLET (100 MG) BY MOUTH NIGHTLY. 90 tablet 1    ALPRAZolam (XANAX) 1 MG tablet Take 1 tablet (1 mg total) by mouth daily as needed for Anxiety. Bid prn 30 tablet 5    amiodarone (PACERONE) 400 MG tablet Take 1 tablet (400 mg total) by mouth once daily. 30 tablet 11    amLODIPine  (NORVASC) 10 MG tablet TAKE 1 TABLET BY MOUTH EVERY DAY 90 tablet 3    aspirin (ECOTRIN) 81 MG EC tablet Take 1 tablet (81 mg total) by mouth once daily. 30 tablet 11    busPIRone (BUSPAR) 5 MG Tab Take 1 tablet (5 mg total) by mouth 3 (three) times daily. 90 tablet 1    carvediloL (COREG) 3.125 MG tablet Take 1 tablet (3.125 mg total) by mouth 2 (two) times daily with meals. 60 tablet 11    ferrous gluconate 324 mg (37.5 mg iron) Tab tablet Take 1 tablet (324 mg total) by mouth with lunch. 30 tablet 11    levothyroxine (SYNTHROID) 112 MCG tablet Take 1 tablet (112 mcg total) by mouth before breakfast. 90 tablet 3    mexiletine (MEXITIL) 250 MG Cap Take 1 capsule (250 mg total) by mouth every 8 (eight) hours. 270 capsule 3    pantoprazole (PROTONIX) 40 MG tablet TAKE 1 TABLET (40 MG TOTAL) BY MOUTH DAILY WITH LUNCH. 90 tablet 3    potassium chloride SA (K-DUR,KLOR-CON) 20 MEQ tablet TAKE 1 TABLET BY MOUTH TWICE A  tablet 1    sildenafiL (VIAGRA) 100 MG tablet Take 1 tablet (100 mg total) by mouth daily as needed. 6 tablet 3    torsemide (DEMADEX) 20 MG Tab Take 2 tablets (40 mg total) by mouth once daily. 180 tablet 3    vitamin D (VITAMIN D3) 1000 units Tab Take 1,000 Units by mouth once daily.      warfarin (COUMADIN) 5 MG tablet Take 7.5 mg orally Tues,Thurs and 5 mg orally daily all other days -- OR AS DIRECTED BY COUMADIN CLINIC 40 tablet 5    zolpidem (AMBIEN CR) 12.5 MG CR tablet Take 1 tablet (12.5 mg total) by mouth nightly as needed for Insomnia. 30 tablet 5     No current facility-administered medications for this visit.        Constitutional: (-)fevers, (-)chills, (-)night sweats  HEENT: (-) headaches (-) lightheadedness (-) blurry vision, (-) nose bleeds   Cardiovascular: (-)chest pain (-)paroxysmal nocturnal dyspnea (-)orthopnea, chest wall pain,   Respiratory: (-)shortness of breath, (+)dyspnea on exertion (-)hemoptysis  Gastrointestinal: (-)abdominal pain (-)nausea (-)vomiting  (-)hematemesis    Musculoskeletal: (-)arthralgias (+)limited range of motion  Neurologic: (-)parasthesias (-)mood disorder (-)anxiety  Endo: (-)polyuria (-)polydipsia (-)heat/cold intolerance  Skin: (-)rash     Physical Exam:   Wt Readings from Last 3 Encounters:   01/21/22 121 kg (266 lb 12.1 oz)   01/19/22 121.6 kg (268 lb)   11/11/21 124.3 kg (274 lb)     Temp Readings from Last 3 Encounters:   01/19/22 98.9 °F (37.2 °C) (Oral)   11/11/21 98.6 °F (37 °C) (Oral)   09/16/21 98.1 °F (36.7 °C) (Oral)     BP Readings from Last 3 Encounters:   01/19/22 (!) 86/0   11/11/21 (!) 78/0   11/11/21 (!) 78/0     Pulse Readings from Last 3 Encounters:   01/21/22 88   11/11/21 100   05/05/21 70       NAD  RRR. LVAD in place.  no wheeze. No resp distress.  no abd distension     Most recent Echo:  7/10/2020 Severe left ventricular systolic dysfunction.  LVEF 10%.      Assessment/Plan:  Tim Richards is a 56 y.o. male with non ischemic cardiomyoapthy s/p HM II,  History of VT and VF.      #Hx of VT/VT - on amiodarone and mexiletine; has SICD  # NICM s/p LVAD HM II   # S/p BiV explant complicated by chest wall hematoma     Continue to follow SICD in device clinic. Monitoring lead given advisory from [employer]. Plan to increase NID once software upgrade makes that possible.    Return in 1 year, or earlier prn. Follow TSH, LFTs.

## 2022-01-27 ENCOUNTER — LAB VISIT (OUTPATIENT)
Dept: LAB | Facility: HOSPITAL | Age: 57
End: 2022-01-27
Attending: INTERNAL MEDICINE
Payer: COMMERCIAL

## 2022-01-27 ENCOUNTER — PATIENT MESSAGE (OUTPATIENT)
Dept: CARDIOLOGY | Facility: CLINIC | Age: 57
End: 2022-01-27

## 2022-01-27 ENCOUNTER — ANTI-COAG VISIT (OUTPATIENT)
Dept: CARDIOLOGY | Facility: CLINIC | Age: 57
End: 2022-01-27
Payer: COMMERCIAL

## 2022-01-27 DIAGNOSIS — Z95.811 LVAD (LEFT VENTRICULAR ASSIST DEVICE) PRESENT: Chronic | ICD-10-CM

## 2022-01-27 DIAGNOSIS — Z95.811 LVAD (LEFT VENTRICULAR ASSIST DEVICE) PRESENT: Primary | ICD-10-CM

## 2022-01-27 DIAGNOSIS — Z79.01 ANTICOAGULATION MONITORING, INR RANGE 2-3: ICD-10-CM

## 2022-01-27 LAB
INR PPP: 2.9 (ref 0.8–1.2)
PROTHROMBIN TIME: 29.3 SEC (ref 9–12.5)

## 2022-01-27 PROCEDURE — 93793 ANTICOAG MGMT PT WARFARIN: CPT | Mod: S$GLB,,,

## 2022-01-27 PROCEDURE — 93793 PR ANTICOAGULANT MGMT FOR PT TAKING WARFARIN: ICD-10-PCS | Mod: S$GLB,,,

## 2022-01-27 PROCEDURE — 36415 COLL VENOUS BLD VENIPUNCTURE: CPT | Performed by: INTERNAL MEDICINE

## 2022-01-27 PROCEDURE — 85610 PROTHROMBIN TIME: CPT | Performed by: INTERNAL MEDICINE

## 2022-02-02 ENCOUNTER — ANTI-COAG VISIT (OUTPATIENT)
Dept: CARDIOLOGY | Facility: CLINIC | Age: 57
End: 2022-02-02
Payer: COMMERCIAL

## 2022-02-02 ENCOUNTER — LAB VISIT (OUTPATIENT)
Dept: LAB | Facility: HOSPITAL | Age: 57
End: 2022-02-02
Attending: INTERNAL MEDICINE
Payer: COMMERCIAL

## 2022-02-02 DIAGNOSIS — Z95.811 LVAD (LEFT VENTRICULAR ASSIST DEVICE) PRESENT: Chronic | ICD-10-CM

## 2022-02-02 DIAGNOSIS — Z95.811 LVAD (LEFT VENTRICULAR ASSIST DEVICE) PRESENT: Primary | ICD-10-CM

## 2022-02-02 DIAGNOSIS — Z79.01 ANTICOAGULATION MONITORING, INR RANGE 2-3: ICD-10-CM

## 2022-02-02 LAB
INR PPP: 3.4 (ref 0.8–1.2)
PROTHROMBIN TIME: 34 SEC (ref 9–12.5)

## 2022-02-02 PROCEDURE — 36415 COLL VENOUS BLD VENIPUNCTURE: CPT | Performed by: INTERNAL MEDICINE

## 2022-02-02 PROCEDURE — 93793 ANTICOAG MGMT PT WARFARIN: CPT | Mod: S$GLB,,,

## 2022-02-02 PROCEDURE — 85610 PROTHROMBIN TIME: CPT | Performed by: INTERNAL MEDICINE

## 2022-02-02 PROCEDURE — 93793 PR ANTICOAGULANT MGMT FOR PT TAKING WARFARIN: ICD-10-PCS | Mod: S$GLB,,,

## 2022-02-07 ENCOUNTER — LAB VISIT (OUTPATIENT)
Dept: LAB | Facility: HOSPITAL | Age: 57
End: 2022-02-07
Attending: INTERNAL MEDICINE
Payer: COMMERCIAL

## 2022-02-07 ENCOUNTER — ANTI-COAG VISIT (OUTPATIENT)
Dept: CARDIOLOGY | Facility: CLINIC | Age: 57
End: 2022-02-07
Payer: COMMERCIAL

## 2022-02-07 DIAGNOSIS — Z79.01 ANTICOAGULATION MONITORING, INR RANGE 2-3: ICD-10-CM

## 2022-02-07 DIAGNOSIS — Z95.811 LVAD (LEFT VENTRICULAR ASSIST DEVICE) PRESENT: Primary | ICD-10-CM

## 2022-02-07 DIAGNOSIS — Z95.811 LVAD (LEFT VENTRICULAR ASSIST DEVICE) PRESENT: ICD-10-CM

## 2022-02-07 LAB
INR PPP: 2.6 (ref 0.8–1.2)
PROTHROMBIN TIME: 26.9 SEC (ref 9–12.5)

## 2022-02-07 PROCEDURE — 93793 ANTICOAG MGMT PT WARFARIN: CPT | Mod: S$GLB,,,

## 2022-02-07 PROCEDURE — 85610 PROTHROMBIN TIME: CPT | Performed by: INTERNAL MEDICINE

## 2022-02-07 PROCEDURE — 36415 COLL VENOUS BLD VENIPUNCTURE: CPT | Performed by: INTERNAL MEDICINE

## 2022-02-07 PROCEDURE — 93793 PR ANTICOAGULANT MGMT FOR PT TAKING WARFARIN: ICD-10-PCS | Mod: S$GLB,,,

## 2022-02-09 ENCOUNTER — PATIENT MESSAGE (OUTPATIENT)
Dept: CARDIOTHORACIC SURGERY | Facility: CLINIC | Age: 57
End: 2022-02-09
Payer: COMMERCIAL

## 2022-02-10 ENCOUNTER — PATIENT MESSAGE (OUTPATIENT)
Dept: CARDIOLOGY | Facility: CLINIC | Age: 57
End: 2022-02-10
Payer: COMMERCIAL

## 2022-02-11 ENCOUNTER — ANTI-COAG VISIT (OUTPATIENT)
Dept: CARDIOLOGY | Facility: CLINIC | Age: 57
End: 2022-02-11
Payer: COMMERCIAL

## 2022-02-11 ENCOUNTER — LAB VISIT (OUTPATIENT)
Dept: LAB | Facility: HOSPITAL | Age: 57
End: 2022-02-11
Attending: INTERNAL MEDICINE
Payer: COMMERCIAL

## 2022-02-11 DIAGNOSIS — Z79.01 ANTICOAGULATION MONITORING, INR RANGE 2-3: ICD-10-CM

## 2022-02-11 DIAGNOSIS — Z95.811 LVAD (LEFT VENTRICULAR ASSIST DEVICE) PRESENT: Primary | ICD-10-CM

## 2022-02-11 DIAGNOSIS — Z95.811 LVAD (LEFT VENTRICULAR ASSIST DEVICE) PRESENT: ICD-10-CM

## 2022-02-11 LAB
INR PPP: 4 (ref 0.8–1.2)
PROTHROMBIN TIME: 40.1 SEC (ref 9–12.5)

## 2022-02-11 PROCEDURE — 85610 PROTHROMBIN TIME: CPT | Performed by: INTERNAL MEDICINE

## 2022-02-11 PROCEDURE — 36415 COLL VENOUS BLD VENIPUNCTURE: CPT | Performed by: INTERNAL MEDICINE

## 2022-02-11 PROCEDURE — 93793 ANTICOAG MGMT PT WARFARIN: CPT | Mod: S$GLB,,,

## 2022-02-11 PROCEDURE — 93793 PR ANTICOAGULANT MGMT FOR PT TAKING WARFARIN: ICD-10-PCS | Mod: S$GLB,,,

## 2022-02-11 NOTE — PROGRESS NOTES
Confirmed dose .  Reports having a cold for the past week - no appetite and taking otc mucinex  no other changes.

## 2022-02-15 ENCOUNTER — PATIENT MESSAGE (OUTPATIENT)
Dept: CARDIOLOGY | Facility: CLINIC | Age: 57
End: 2022-02-15

## 2022-02-15 ENCOUNTER — ANTI-COAG VISIT (OUTPATIENT)
Dept: CARDIOLOGY | Facility: CLINIC | Age: 57
End: 2022-02-15
Payer: COMMERCIAL

## 2022-02-15 ENCOUNTER — LAB VISIT (OUTPATIENT)
Dept: LAB | Facility: HOSPITAL | Age: 57
End: 2022-02-15
Attending: INTERNAL MEDICINE
Payer: COMMERCIAL

## 2022-02-15 DIAGNOSIS — Z95.811 LVAD (LEFT VENTRICULAR ASSIST DEVICE) PRESENT: Primary | ICD-10-CM

## 2022-02-15 DIAGNOSIS — Z79.01 ANTICOAGULATION MONITORING, INR RANGE 2-3: ICD-10-CM

## 2022-02-15 DIAGNOSIS — Z95.811 LVAD (LEFT VENTRICULAR ASSIST DEVICE) PRESENT: ICD-10-CM

## 2022-02-15 LAB
INR PPP: 2.9 (ref 0.8–1.2)
PROTHROMBIN TIME: 29.9 SEC (ref 9–12.5)

## 2022-02-15 PROCEDURE — 36415 COLL VENOUS BLD VENIPUNCTURE: CPT | Performed by: INTERNAL MEDICINE

## 2022-02-15 PROCEDURE — 93793 PR ANTICOAGULANT MGMT FOR PT TAKING WARFARIN: ICD-10-PCS | Mod: S$GLB,,,

## 2022-02-15 PROCEDURE — 85610 PROTHROMBIN TIME: CPT | Performed by: INTERNAL MEDICINE

## 2022-02-15 PROCEDURE — 93793 ANTICOAG MGMT PT WARFARIN: CPT | Mod: S$GLB,,,

## 2022-02-17 ENCOUNTER — TELEPHONE (OUTPATIENT)
Dept: ENDOCRINOLOGY | Facility: CLINIC | Age: 57
End: 2022-02-17
Payer: COMMERCIAL

## 2022-02-17 DIAGNOSIS — T46.2X1A HYPOTHYROIDISM DUE TO AMIODARONE: Primary | ICD-10-CM

## 2022-02-17 DIAGNOSIS — E03.2 HYPOTHYROIDISM DUE TO AMIODARONE: Primary | ICD-10-CM

## 2022-02-24 ENCOUNTER — LAB VISIT (OUTPATIENT)
Dept: LAB | Facility: HOSPITAL | Age: 57
End: 2022-02-24
Attending: INTERNAL MEDICINE
Payer: COMMERCIAL

## 2022-02-24 ENCOUNTER — ANTI-COAG VISIT (OUTPATIENT)
Dept: CARDIOLOGY | Facility: CLINIC | Age: 57
End: 2022-02-24
Payer: COMMERCIAL

## 2022-02-24 DIAGNOSIS — Z95.811 LVAD (LEFT VENTRICULAR ASSIST DEVICE) PRESENT: Primary | ICD-10-CM

## 2022-02-24 DIAGNOSIS — Z79.01 ANTICOAGULATION MONITORING, INR RANGE 2-3: ICD-10-CM

## 2022-02-24 DIAGNOSIS — Z95.811 LVAD (LEFT VENTRICULAR ASSIST DEVICE) PRESENT: ICD-10-CM

## 2022-02-24 LAB
INR PPP: 2.9 (ref 0.8–1.2)
PROTHROMBIN TIME: 29.7 SEC (ref 9–12.5)

## 2022-02-24 PROCEDURE — 85610 PROTHROMBIN TIME: CPT | Performed by: INTERNAL MEDICINE

## 2022-02-24 PROCEDURE — 93793 ANTICOAG MGMT PT WARFARIN: CPT | Mod: S$GLB,,,

## 2022-02-24 PROCEDURE — 93793 PR ANTICOAGULANT MGMT FOR PT TAKING WARFARIN: ICD-10-PCS | Mod: S$GLB,,,

## 2022-02-24 PROCEDURE — 36415 COLL VENOUS BLD VENIPUNCTURE: CPT | Performed by: INTERNAL MEDICINE

## 2022-03-04 ENCOUNTER — ANTI-COAG VISIT (OUTPATIENT)
Dept: CARDIOLOGY | Facility: CLINIC | Age: 57
End: 2022-03-04
Payer: COMMERCIAL

## 2022-03-04 ENCOUNTER — LAB VISIT (OUTPATIENT)
Dept: LAB | Facility: HOSPITAL | Age: 57
End: 2022-03-04
Attending: INTERNAL MEDICINE
Payer: COMMERCIAL

## 2022-03-04 DIAGNOSIS — Z79.01 ANTICOAGULATION MONITORING, INR RANGE 2-3: ICD-10-CM

## 2022-03-04 DIAGNOSIS — Z95.811 LVAD (LEFT VENTRICULAR ASSIST DEVICE) PRESENT: Primary | ICD-10-CM

## 2022-03-04 DIAGNOSIS — Z95.811 LVAD (LEFT VENTRICULAR ASSIST DEVICE) PRESENT: Chronic | ICD-10-CM

## 2022-03-04 LAB
INR PPP: 2.8 (ref 0.8–1.2)
PROTHROMBIN TIME: 28.2 SEC (ref 9–12.5)

## 2022-03-04 PROCEDURE — 93793 PR ANTICOAGULANT MGMT FOR PT TAKING WARFARIN: ICD-10-PCS | Mod: S$GLB,,,

## 2022-03-04 PROCEDURE — 85610 PROTHROMBIN TIME: CPT | Performed by: INTERNAL MEDICINE

## 2022-03-04 PROCEDURE — 93793 ANTICOAG MGMT PT WARFARIN: CPT | Mod: S$GLB,,,

## 2022-03-04 PROCEDURE — 36415 COLL VENOUS BLD VENIPUNCTURE: CPT | Performed by: INTERNAL MEDICINE

## 2022-03-11 ENCOUNTER — LAB VISIT (OUTPATIENT)
Dept: LAB | Facility: HOSPITAL | Age: 57
End: 2022-03-11
Attending: INTERNAL MEDICINE
Payer: COMMERCIAL

## 2022-03-11 ENCOUNTER — ANTI-COAG VISIT (OUTPATIENT)
Dept: CARDIOLOGY | Facility: CLINIC | Age: 57
End: 2022-03-11
Payer: COMMERCIAL

## 2022-03-11 DIAGNOSIS — Z95.811 LVAD (LEFT VENTRICULAR ASSIST DEVICE) PRESENT: Primary | ICD-10-CM

## 2022-03-11 DIAGNOSIS — Z79.01 ANTICOAGULATION MONITORING, INR RANGE 2-3: ICD-10-CM

## 2022-03-11 DIAGNOSIS — Z95.811 LVAD (LEFT VENTRICULAR ASSIST DEVICE) PRESENT: Chronic | ICD-10-CM

## 2022-03-11 LAB
INR PPP: 2.9 (ref 0.8–1.2)
PROTHROMBIN TIME: 29.7 SEC (ref 9–12.5)

## 2022-03-11 PROCEDURE — 93793 PR ANTICOAGULANT MGMT FOR PT TAKING WARFARIN: ICD-10-PCS | Mod: S$GLB,,,

## 2022-03-11 PROCEDURE — 36415 COLL VENOUS BLD VENIPUNCTURE: CPT | Performed by: INTERNAL MEDICINE

## 2022-03-11 PROCEDURE — 85610 PROTHROMBIN TIME: CPT | Performed by: INTERNAL MEDICINE

## 2022-03-11 PROCEDURE — 93793 ANTICOAG MGMT PT WARFARIN: CPT | Mod: S$GLB,,,

## 2022-03-14 ENCOUNTER — CLINICAL SUPPORT (OUTPATIENT)
Dept: CARDIOLOGY | Facility: HOSPITAL | Age: 57
End: 2022-03-14
Payer: COMMERCIAL

## 2022-03-14 DIAGNOSIS — I47.20 VENTRICULAR TACHYCARDIA: ICD-10-CM

## 2022-03-14 DIAGNOSIS — Z95.810 PRESENCE OF AUTOMATIC (IMPLANTABLE) CARDIAC DEFIBRILLATOR: ICD-10-CM

## 2022-03-14 PROCEDURE — 93296 REM INTERROG EVL PM/IDS: CPT | Performed by: INTERNAL MEDICINE

## 2022-03-17 ENCOUNTER — OFFICE VISIT (OUTPATIENT)
Dept: TRANSPLANT | Facility: CLINIC | Age: 57
End: 2022-03-17
Payer: COMMERCIAL

## 2022-03-17 ENCOUNTER — DOCUMENTATION ONLY (OUTPATIENT)
Dept: CARDIOTHORACIC SURGERY | Facility: CLINIC | Age: 57
End: 2022-03-17
Payer: COMMERCIAL

## 2022-03-17 ENCOUNTER — LAB VISIT (OUTPATIENT)
Dept: LAB | Facility: HOSPITAL | Age: 57
End: 2022-03-17
Attending: INTERNAL MEDICINE
Payer: COMMERCIAL

## 2022-03-17 ENCOUNTER — CLINICAL SUPPORT (OUTPATIENT)
Dept: TRANSPLANT | Facility: CLINIC | Age: 57
End: 2022-03-17
Payer: COMMERCIAL

## 2022-03-17 ENCOUNTER — ANTI-COAG VISIT (OUTPATIENT)
Dept: CARDIOLOGY | Facility: CLINIC | Age: 57
End: 2022-03-17
Payer: COMMERCIAL

## 2022-03-17 VITALS
HEART RATE: 76 BPM | TEMPERATURE: 99 F | WEIGHT: 256.31 LBS | SYSTOLIC BLOOD PRESSURE: 84 MMHG | BODY MASS INDEX: 33.81 KG/M2

## 2022-03-17 DIAGNOSIS — Z95.811 LVAD (LEFT VENTRICULAR ASSIST DEVICE) PRESENT: Primary | ICD-10-CM

## 2022-03-17 DIAGNOSIS — G47.33 OSA (OBSTRUCTIVE SLEEP APNEA): ICD-10-CM

## 2022-03-17 DIAGNOSIS — I42.0 DILATED CARDIOMYOPATHY: ICD-10-CM

## 2022-03-17 DIAGNOSIS — Z79.01 ANTICOAGULATION MONITORING, INR RANGE 2-3: ICD-10-CM

## 2022-03-17 DIAGNOSIS — E03.2 HYPOTHYROIDISM DUE TO AMIODARONE: ICD-10-CM

## 2022-03-17 DIAGNOSIS — I10 ESSENTIAL HYPERTENSION: ICD-10-CM

## 2022-03-17 DIAGNOSIS — T46.2X1A HYPOTHYROIDISM DUE TO AMIODARONE: ICD-10-CM

## 2022-03-17 DIAGNOSIS — Z95.811 LVAD (LEFT VENTRICULAR ASSIST DEVICE) PRESENT: Primary | Chronic | ICD-10-CM

## 2022-03-17 DIAGNOSIS — Z95.811 LVAD (LEFT VENTRICULAR ASSIST DEVICE) PRESENT: Chronic | ICD-10-CM

## 2022-03-17 DIAGNOSIS — I47.20 VT (VENTRICULAR TACHYCARDIA): Chronic | ICD-10-CM

## 2022-03-17 DIAGNOSIS — I50.42 CHRONIC COMBINED SYSTOLIC AND DIASTOLIC HEART FAILURE: ICD-10-CM

## 2022-03-17 LAB
ALBUMIN SERPL BCP-MCNC: 3.9 G/DL (ref 3.5–5.2)
ALP SERPL-CCNC: 132 U/L (ref 55–135)
ALT SERPL W/O P-5'-P-CCNC: 23 U/L (ref 10–44)
ANION GAP SERPL CALC-SCNC: 11 MMOL/L (ref 8–16)
AST SERPL-CCNC: 27 U/L (ref 10–40)
BASOPHILS # BLD AUTO: 0.02 K/UL (ref 0–0.2)
BASOPHILS NFR BLD: 0.5 % (ref 0–1.9)
BILIRUB DIRECT SERPL-MCNC: 0.3 MG/DL (ref 0.1–0.3)
BILIRUB SERPL-MCNC: 0.7 MG/DL (ref 0.1–1)
BNP SERPL-MCNC: 140 PG/ML (ref 0–99)
BUN SERPL-MCNC: 18 MG/DL (ref 6–20)
CALCIUM SERPL-MCNC: 9.7 MG/DL (ref 8.7–10.5)
CHLORIDE SERPL-SCNC: 100 MMOL/L (ref 95–110)
CO2 SERPL-SCNC: 26 MMOL/L (ref 23–29)
CREAT SERPL-MCNC: 2.2 MG/DL (ref 0.5–1.4)
CRP SERPL-MCNC: 17 MG/L (ref 0–8.2)
DIFFERENTIAL METHOD: ABNORMAL
EOSINOPHIL # BLD AUTO: 0.1 K/UL (ref 0–0.5)
EOSINOPHIL NFR BLD: 1.2 % (ref 0–8)
ERYTHROCYTE [DISTWIDTH] IN BLOOD BY AUTOMATED COUNT: 15.4 % (ref 11.5–14.5)
EST. GFR  (AFRICAN AMERICAN): 37.6 ML/MIN/1.73 M^2
EST. GFR  (NON AFRICAN AMERICAN): 32.5 ML/MIN/1.73 M^2
GLUCOSE SERPL-MCNC: 85 MG/DL (ref 70–110)
HCT VFR BLD AUTO: 46.8 % (ref 40–54)
HGB BLD-MCNC: 14.2 G/DL (ref 14–18)
IMM GRANULOCYTES # BLD AUTO: 0.03 K/UL (ref 0–0.04)
IMM GRANULOCYTES NFR BLD AUTO: 0.7 % (ref 0–0.5)
INR PPP: 2.8 (ref 0.8–1.2)
LDH SERPL L TO P-CCNC: 285 U/L (ref 110–260)
LYMPHOCYTES # BLD AUTO: 1 K/UL (ref 1–4.8)
LYMPHOCYTES NFR BLD: 22.7 % (ref 18–48)
MAGNESIUM SERPL-MCNC: 1.9 MG/DL (ref 1.6–2.6)
MCH RBC QN AUTO: 26.9 PG (ref 27–31)
MCHC RBC AUTO-ENTMCNC: 30.3 G/DL (ref 32–36)
MCV RBC AUTO: 89 FL (ref 82–98)
MONOCYTES # BLD AUTO: 0.5 K/UL (ref 0.3–1)
MONOCYTES NFR BLD: 12.8 % (ref 4–15)
NEUTROPHILS # BLD AUTO: 2.6 K/UL (ref 1.8–7.7)
NEUTROPHILS NFR BLD: 62.1 % (ref 38–73)
NRBC BLD-RTO: 0 /100 WBC
PHOSPHATE SERPL-MCNC: 2.7 MG/DL (ref 2.7–4.5)
PLATELET # BLD AUTO: 272 K/UL (ref 150–450)
PMV BLD AUTO: 10.2 FL (ref 9.2–12.9)
POTASSIUM SERPL-SCNC: 4 MMOL/L (ref 3.5–5.1)
PREALB SERPL-MCNC: 26 MG/DL (ref 20–43)
PROT SERPL-MCNC: 8.5 G/DL (ref 6–8.4)
PROTHROMBIN TIME: 27.5 SEC (ref 9–12.5)
RBC # BLD AUTO: 5.27 M/UL (ref 4.6–6.2)
SODIUM SERPL-SCNC: 137 MMOL/L (ref 136–145)
T4 FREE SERPL-MCNC: 1.31 NG/DL (ref 0.71–1.51)
TSH SERPL DL<=0.005 MIU/L-ACNC: 4.08 UIU/ML (ref 0.4–4)
WBC # BLD AUTO: 4.23 K/UL (ref 3.9–12.7)

## 2022-03-17 PROCEDURE — 84100 ASSAY OF PHOSPHORUS: CPT | Performed by: INTERNAL MEDICINE

## 2022-03-17 PROCEDURE — 99215 PR OFFICE/OUTPT VISIT, EST, LEVL V, 40-54 MIN: ICD-10-PCS | Mod: S$GLB,,, | Performed by: INTERNAL MEDICINE

## 2022-03-17 PROCEDURE — 99999 PR PBB SHADOW E&M-EST. PATIENT-LVL V: ICD-10-PCS | Mod: PBBFAC,,, | Performed by: INTERNAL MEDICINE

## 2022-03-17 PROCEDURE — 83735 ASSAY OF MAGNESIUM: CPT | Performed by: INTERNAL MEDICINE

## 2022-03-17 PROCEDURE — 80053 COMPREHEN METABOLIC PANEL: CPT | Performed by: INTERNAL MEDICINE

## 2022-03-17 PROCEDURE — 99999 PR PBB SHADOW E&M-EST. PATIENT-LVL I: CPT | Mod: PBBFAC,,,

## 2022-03-17 PROCEDURE — 99999 PR PBB SHADOW E&M-EST. PATIENT-LVL V: CPT | Mod: PBBFAC,,, | Performed by: INTERNAL MEDICINE

## 2022-03-17 PROCEDURE — 93750 INTERROGATION VAD IN PERSON: CPT | Mod: S$GLB,,, | Performed by: INTERNAL MEDICINE

## 2022-03-17 PROCEDURE — 99999 PR PBB SHADOW E&M-EST. PATIENT-LVL I: ICD-10-PCS | Mod: PBBFAC,,,

## 2022-03-17 PROCEDURE — 85025 COMPLETE CBC W/AUTO DIFF WBC: CPT | Performed by: INTERNAL MEDICINE

## 2022-03-17 PROCEDURE — 99215 OFFICE O/P EST HI 40 MIN: CPT | Mod: S$GLB,,, | Performed by: INTERNAL MEDICINE

## 2022-03-17 PROCEDURE — 84439 ASSAY OF FREE THYROXINE: CPT | Performed by: INTERNAL MEDICINE

## 2022-03-17 PROCEDURE — 85610 PROTHROMBIN TIME: CPT | Performed by: INTERNAL MEDICINE

## 2022-03-17 PROCEDURE — 84443 ASSAY THYROID STIM HORMONE: CPT | Performed by: INTERNAL MEDICINE

## 2022-03-17 PROCEDURE — 36415 COLL VENOUS BLD VENIPUNCTURE: CPT | Performed by: INTERNAL MEDICINE

## 2022-03-17 PROCEDURE — 93750 OP LVAD INTERROGATION: ICD-10-PCS | Mod: S$GLB,,, | Performed by: INTERNAL MEDICINE

## 2022-03-17 PROCEDURE — 86140 C-REACTIVE PROTEIN: CPT | Performed by: INTERNAL MEDICINE

## 2022-03-17 PROCEDURE — 83615 LACTATE (LD) (LDH) ENZYME: CPT | Performed by: INTERNAL MEDICINE

## 2022-03-17 PROCEDURE — 84134 ASSAY OF PREALBUMIN: CPT | Performed by: INTERNAL MEDICINE

## 2022-03-17 PROCEDURE — 82248 BILIRUBIN DIRECT: CPT | Performed by: INTERNAL MEDICINE

## 2022-03-17 PROCEDURE — 83880 ASSAY OF NATRIURETIC PEPTIDE: CPT | Performed by: INTERNAL MEDICINE

## 2022-03-17 RX ORDER — TORSEMIDE 20 MG/1
20 TABLET ORAL DAILY
Qty: 180 TABLET | Refills: 3 | Status: SHIPPED | OUTPATIENT
Start: 2022-03-17 | End: 2022-06-27

## 2022-03-17 NOTE — PROCEDURES
TXP SACHI INTERROGATIONS 3/17/2022 1/19/2022 11/11/2021 9/16/2021 7/7/2021 5/5/2021 3/25/2021   Type HeartMate II HeartMate II HeartMate II HeartMate II HeartMate II HeartMate II HeartMate II   Flow 4.2 5.6 7.2 4.4 4.1 4.5 5.1   Speed 9800 9800 9800 9800 9800 9800 9800   PI 3.4 2.6 2.3 3.0 2.7 3.1 3.2   Power (Jackson) 5.8 6.5 7.4 5.9 5.7 6.2 6.3   LSL 9400 9400 9400 9400 8800 9400 9400   Pulsatility No Pulse No Pulse No Pulse No Pulse - No Pulse Intermittent pulse   }

## 2022-03-17 NOTE — PROGRESS NOTES
Pt presented for 48 month follow-up and verbalized consent and willingness to continue to participate with the INTERMACS registry.      Patient completed the EQ-5D quality of life questionnaire, KCCQ, and the Trail Making neurocognitive test in 51 seconds.

## 2022-03-17 NOTE — LETTER
March 17, 2022        Nader Chiu  [address] [facility] [address]  Phone: [phone]  Fax: [phone]     Nader Chiu  [address]  [address]  [facility] [address]  Phone: [phone]  Fax: [phone]             [facility]  [address]  [facility] [address]  Phone: [phone]   Patient: Tim Richards   MR Number: [id number]   YOB: 1965   Date of Visit: 3/17/2022       Dear Dr. Nader Chiu, Nader Chiu    Thank you for referring Tim Richards to me for evaluation. Attached you will find relevant portions of my assessment and plan of care.    If you have questions, please do not hesitate to call me. I look forward to following Tim Richards along with you.    Sincerely,    Guerda Bautista MD    Enclosure    If you would like to receive this communication electronically, please contact [email or web address] or [phone] to request [employer] Link access.    [employer] Link is a tool which provides read-only access to select patient information with whom you have a relationship. Its easy to use and provides real time access to review your patients record including encounter summaries, notes, results, and demographic information.    If you feel you have received this communication in error or would no longer like to receive these types of communications, please e-mail [email or web address]

## 2022-03-17 NOTE — PROGRESS NOTES
"Subjective:   Patient ID:  Tim Richards is a 56 y.o. male who presents for LVAD followup visit.    Implant Date: 3/8/2018     Heartmate II RPM 9800  3/9/18 outflow graft changed     INR goal: 2-3   NO Bridge with heparin    TXP SACHI INTERROGATIONS 3/17/2022   Type HeartMate II   Flow 4.2   Speed 9800   PI 3.4   Power (Jackson) 5.8   LSL 9400   Pulsatility No Pulse       HPI  Mr. Richards is a very pleasant 57 yo black male with stage D HFrEF, DCM, HBP, CHF stage D s/p HM 2 who comes for his regular VAD visit. Doing well.  Reports NYHA class II symptoms. VAD speed is at 9800 rpm. No VAD alarms noted on interrogation occasional PI events . BP is 84 mm of Hg. DLES is a "1". INR is therapeutic at 2.8. LDh is at baseline.    Review of Systems   Constitutional: Negative. Negative for chills, decreased appetite, diaphoresis, fever, malaise/fatigue, night sweats, weight gain and weight loss.   Eyes: Negative.    Cardiovascular: Negative for chest pain, claudication, cyanosis, dyspnea on exertion, irregular heartbeat, leg swelling, near-syncope, orthopnea, palpitations, paroxysmal nocturnal dyspnea and syncope.   Respiratory: Negative for cough, hemoptysis and shortness of breath.    Endocrine: Negative.    Hematologic/Lymphatic: Negative.    Skin: Negative for color change, dry skin and nail changes.   Musculoskeletal: Negative.    Gastrointestinal: Negative.    Genitourinary: Negative.    Neurological: Negative for weakness.       Objective:   Blood pressure (!) 84/0, pulse 76, temperature 98.5 °F (36.9 °C), weight 116.3 kg (256 lb 4.8 oz).body mass index is 33.81 kg/m².    Doppler: 84    Physical Exam  Vitals reviewed.   Constitutional:       Appearance: He is well-developed.      Comments: Wt 116.3 kg (256 lb 4.8 oz)   BMI 33.81 kg/m²      HENT:      Head: Normocephalic.   Neck:      Vascular: No carotid bruit or JVD.   Cardiovascular:      Chest Wall: PMI is not displaced.      Heart sounds: No murmur heard.     " "Comments: Smooth VAD hum. DLES is "1"  Pulmonary:      Effort: Pulmonary effort is normal.      Breath sounds: Normal breath sounds.   Abdominal:      General: Bowel sounds are normal.      Palpations: Abdomen is soft.   Skin:     General: Skin is warm.   Neurological:      Mental Status: He is alert.         Lab Results   Component Value Date    WBC 4.23 03/17/2022    HGB 14.2 03/17/2022    HCT 46.8 03/17/2022    MCV 89 03/17/2022     03/17/2022    CO2 26 03/17/2022    CREATININE 2.2 (H) 03/17/2022    CALCIUM 9.7 03/17/2022    ALBUMIN 3.9 03/17/2022    AST 27 03/17/2022     (H) 03/17/2022    ALT 23 03/17/2022     (H) 01/19/2022       Lab Results   Component Value Date    INR 2.8 (H) 03/17/2022    INR 2.9 (H) 03/11/2022    INR 2.8 (H) 03/04/2022       BNP   Date Value Ref Range Status   03/17/2022 140 (H) 0 - 99 pg/mL Final     Comment:     Values of less than 100 pg/ml are consistent with non-CHF populations.   01/19/2022 197 (H) 0 - 99 pg/mL Final     Comment:     Values of less than 100 pg/ml are consistent with non-CHF populations.   11/11/2021 131 (H) 0 - 99 pg/mL Final     Comment:     Values of less than 100 pg/ml are consistent with non-CHF populations.       LD   Date Value Ref Range Status   01/19/2022 337 (H) 110 - 260 U/L Final     Comment:     Results are increased in hemolyzed samples.   11/11/2021 388 (H) 110 - 260 U/L Final     Comment:     Results are increased in hemolyzed samples.   09/16/2021 304 (H) 110 - 260 U/L Final     Comment:     Results are increased in hemolyzed samples.       Assessment:      1. LVAD (left ventricular assist device) present    2. Chronic combined systolic and diastolic heart failure    3. Dilated cardiomyopathy    4. Essential hypertension    5. CHICHI (obstructive sleep apnea)    6. VT (ventricular tachycardia)        Plan:   Patient is now NYHA class II. BP is controlled.  INR is therapeutic. Creatinine is up.  Euvolemic on exam. Will decrease " torsemide to 20 mg daily (currently taking 40 mg daily). BMP and BNP next week.  VAD interrogation was performed in clinic  Any VAD management kits dispensed today medically necessary  Recommend 2 gram sodium restriction and 1500cc fluid restriction.  Encourage physical activity with graded exercise program.  Requested patient to weigh themselves daily, and to notify us if their weight increases by more than 3 lbs in 1 day or 5 lbs in 1 week.   RTC in 2 months  Additionally, the patient has a patient centered goal of being able to continue to do well.     Listed for transplant: No. Implanted as DT.     UNOS Patient Status  Functional Status: 90% - Able to carry on normal activity: minor symptoms of disease  Physical Capacity: No Limitations  Working for Income: Unknown        Guerda Bautista MD

## 2022-03-17 NOTE — PROGRESS NOTES
"Date of Implant with Heartmate 3 LVAD: 3/8/2018     PATIENT ARRIVED IN CLINIC:  Ambulatory   Accompanied by: Self    Vitals  Temperature, oral:   Temp Readings from Last 1 Encounters:   03/17/22 98.5 °F (36.9 °C)     Blood Pressure:   BP Readings from Last 3 Encounters:   03/17/22 (!) 84/0   01/19/22 (!) 86/0   11/11/21 (!) 78/0        VAD Interrogation:  TXP SACHI INTERROGATIONS 3/17/2022 1/19/2022 11/11/2021   Type HeartMate II HeartMate II HeartMate II   Flow 4.2 5.6 7.2   Speed 9800 9800 9800   PI 3.4 2.6 2.3   Power (Jackson) 5.8 6.5 7.4   LSL 9400 9400 9400   Pulsatility No Pulse No Pulse No Pulse       History Log: [id number]  Problems / Issues / Alarms with VAD if any: None noted  VAD Sounds: HM2 Smooth    HCT:   Lab Results   Component Value Date    HCT 46.8 03/17/2022    HCT 38 07/10/2020       Complaints/reason for visit today: routine    VAD Binder With Patient: no   Reviewed VAD Numbers In Binder: no  Enrolled in Care Companion: no    Equipment:  Emergency Equipment With Patient: yes   Any Equipment Issues: None noted   It is medically necessary to ensure patient has properly functioning equipment and wearables to prevent infection, injury or death to patient.     DLES Assessment:  Appearance Of Driveline: "1"  Antibiotics: NO  Velour: no  Manual & Visual Inspection Of Driveline: No kinks or tears noted  Stabilization Device In Use: yes, sun securement device      Patient MyChart Questionnaire:   VAD QUESTIONNAIRE ANSWERS 7/7/2021   Have you had any LVAD related issues or alarms? No   If yes, please provide additional details: -   Have you had any LVAD Equipment issues? Yes   If yes, please provide additional details: Controller or batteries   How often do you do your LVAD dressing change? Every other day   What type of dressing change do you do?  Daily "scrubby" kits   Do you need dressing change supplies? Yes   If yes, what kind (i.e. boxes/kits)? Box   Do you need any new LVAD Accessories? (i.e " Battery Holster Vest, Holster Vest, RT/LT Consolidation Bag) No   If yes, what kind of accessories do you need? Na   Have you been using your Monthly Equipment Checklist? Yes   Description of Stools: Normal and formed without blood   How Often: Weekly   Color Of Urine: Clear/Yellow   Are you experiencing: Anxiety   Do you see a: Psychiatrist   How many hours per night are you sleeping? 7   Do you take any medications to help you fall asleep? Yes   If yes, what medications do you take to help you fall asleep?  Xanex   Are you Showering? Yes   Do you change your dressing immediately after you dry off?  Yes   Are you exercising? Yes   If so, what do you do and for how long per day? 1hr   How often are you exercising? Daily   Are you working or what do you do to stay active? N/a   Are you driving? Yes   Do you know that if you have an alarm while driving you need to pull over first before looking at your alarm to avoid an accident? Yes   Are you in pain? No   If so, please rate: -   What hurts? -   Describe pain: -   Do you take any medications for pain?  No   If yes, what kind? -   Does this relieve the pain? -   How often are you taking pain medicine? -   What can we do for you? Anything   Is your appointment today? Yes   Do you have your Emergency Bag with you? Yes   Do you have your VAD Binder with you in clinic today?  No        Assessment:   PAIN: NO  Complaints Of Nausea / Vomiting: None noted    Appearance and Frequency Of Stools: normal and formed without blood & daily  Color Of Urine: clear/yellow  Coping/Depression/Anxiety: coping okay  Sleep Habits: 8 hrs /night  Sleep Aids: None noted  Showering: Yes, reminded to change dressing immediately after drying off  Activity/Exercise: walking   Driving: Yes. Reminded to pull over should there be an alarm before looking down at controller.    DLES Dressing Care:   Frequency of Dressing Changes: every other day & daily kit  Pt In Need Of Management Kits?:yes -   1  Box of daily kit  It is medically necessary to have VAD management kits in order to prevent infection or to assist in the healing of an infected DLES.    Labs:    Chemistry        Component Value Date/Time     03/17/2022 1240    K 4.0 03/17/2022 1240     03/17/2022 1240    CO2 26 03/17/2022 1240    BUN 18 03/17/2022 1240    CREATININE 2.2 (H) 03/17/2022 1240    GLU 85 03/17/2022 1240        Component Value Date/Time    CALCIUM 9.7 03/17/2022 1240    ALKPHOS 132 03/17/2022 1240    AST 27 03/17/2022 1240    ALT 23 03/17/2022 1240    BILITOT 0.7 03/17/2022 1240    ESTGFRAFRICA 37.6 (A) 03/17/2022 1240    EGFRNONAA 32.5 (A) 03/17/2022 1240            Magnesium   Date Value Ref Range Status   03/17/2022 1.9 1.6 - 2.6 mg/dL Final       Lab Results   Component Value Date    WBC 4.23 03/17/2022    HGB 14.2 03/17/2022    HCT 46.8 03/17/2022    MCV 89 03/17/2022     03/17/2022       Lab Results   Component Value Date    INR 2.8 (H) 03/17/2022    INR 2.9 (H) 03/11/2022    INR 2.8 (H) 03/04/2022       BNP   Date Value Ref Range Status   03/17/2022 140 (H) 0 - 99 pg/mL Final     Comment:     Values of less than 100 pg/ml are consistent with non-CHF populations.   01/19/2022 197 (H) 0 - 99 pg/mL Final     Comment:     Values of less than 100 pg/ml are consistent with non-CHF populations.   11/11/2021 131 (H) 0 - 99 pg/mL Final     Comment:     Values of less than 100 pg/ml are consistent with non-CHF populations.       LD   Date Value Ref Range Status   03/17/2022 285 (H) 110 - 260 U/L Final     Comment:     Results are increased in hemolyzed samples.   01/19/2022 337 (H) 110 - 260 U/L Final     Comment:     Results are increased in hemolyzed samples.   11/11/2021 388 (H) 110 - 260 U/L Final     Comment:     Results are increased in hemolyzed samples.       Labs reviewed with patient: YES      Patient Satisfaction Survey completed per patient: No  (explained about signature and box to check)  Medication  reconciliation: per MA.  Medication Detail updated today: patient did not bring the card  Coumadin Managed by: [facility]    Education: Reviewed driveline care, emergency procedures, how to change the controller, alarms with patient, as well as discussed how to page the VAD coordinator in case of an emergency.   Covid - 19 education: Reminded patient/caregiver to check temperature daily and call us if it is > 99.0.  Reminded them  to stay 6 feet away from other people, wash hands frequently, don't touch your face and stay home except to get labs, medications, and appts.    Covid Vaccine: Pt informed that we are encouraging all VAD patients to receive the COVID vaccine.  Informed pt that they can take tylenol but should avoid other NSAIDs.      Plans/Needs: routine f/u w/ Dr Bautista. Patient reports feeling well. States he hasn't felt this well in a while. Cr slightly elevated, decrease torsemide to 20mg daily and recheck bmp/bnp next week.     RTC 2 months w/ echo & FLP     Hurricane Season: No

## 2022-03-28 ENCOUNTER — PATIENT MESSAGE (OUTPATIENT)
Dept: CARDIOLOGY | Facility: CLINIC | Age: 57
End: 2022-03-28
Payer: COMMERCIAL

## 2022-03-28 ENCOUNTER — PATIENT OUTREACH (OUTPATIENT)
Dept: ADMINISTRATIVE | Facility: OTHER | Age: 57
End: 2022-03-28
Payer: COMMERCIAL

## 2022-03-28 NOTE — PROGRESS NOTES
Health Maintenance Due   Topic Date Due    Shingles Vaccine (1 of 2) Never done    LDCT Lung Screen  01/13/2019    Influenza Vaccine (1) 09/01/2021     Updates were requested from care everywhere.  Chart was reviewed for overdue Proactive [facility] Encounters ([facility]) topics (CRS, Breast Cancer Screening, Eye exam)  Health Maintenance has been updated.  LINKS immunization registry triggered.  Immunizations were reconciled.

## 2022-03-29 ENCOUNTER — ANTI-COAG VISIT (OUTPATIENT)
Dept: CARDIOLOGY | Facility: CLINIC | Age: 57
End: 2022-03-29
Payer: COMMERCIAL

## 2022-03-29 ENCOUNTER — CLINICAL SUPPORT (OUTPATIENT)
Dept: CARDIOLOGY | Facility: HOSPITAL | Age: 57
End: 2022-03-29
Attending: INTERNAL MEDICINE
Payer: COMMERCIAL

## 2022-03-29 ENCOUNTER — OFFICE VISIT (OUTPATIENT)
Dept: ENDOCRINOLOGY | Facility: CLINIC | Age: 57
End: 2022-03-29
Payer: COMMERCIAL

## 2022-03-29 VITALS — RESPIRATION RATE: 18 BRPM | WEIGHT: 266 LBS | BODY MASS INDEX: 35.25 KG/M2 | HEIGHT: 73 IN | OXYGEN SATURATION: 99 %

## 2022-03-29 DIAGNOSIS — I49.8 OTHER SPECIFIED CARDIAC ARRHYTHMIAS: ICD-10-CM

## 2022-03-29 DIAGNOSIS — Z95.811 HEART REPLACED BY HEART ASSIST DEVICE: ICD-10-CM

## 2022-03-29 DIAGNOSIS — T46.2X1A HYPOTHYROIDISM DUE TO AMIODARONE: Primary | ICD-10-CM

## 2022-03-29 DIAGNOSIS — Z79.899 HIGH RISK MEDICATIONS (NOT ANTICOAGULANTS) LONG-TERM USE: ICD-10-CM

## 2022-03-29 DIAGNOSIS — E03.2 HYPOTHYROIDISM DUE TO AMIODARONE: Primary | ICD-10-CM

## 2022-03-29 DIAGNOSIS — Z95.811 LVAD (LEFT VENTRICULAR ASSIST DEVICE) PRESENT: ICD-10-CM

## 2022-03-29 DIAGNOSIS — Z79.01 ANTICOAGULATION MONITORING, INR RANGE 2-3: Primary | ICD-10-CM

## 2022-03-29 PROBLEM — T82.7XXA INFECTION INVOLVING IMPLANTABLE CARDIOVERTER-DEFIBRILLATOR (ICD): Status: RESOLVED | Noted: 2020-07-01 | Resolved: 2022-03-29

## 2022-03-29 PROBLEM — E05.90 HYPERTHYROIDISM: Status: RESOLVED | Noted: 2020-01-13 | Resolved: 2022-03-29

## 2022-03-29 PROBLEM — E03.8 OTHER SPECIFIED HYPOTHYROIDISM: Status: RESOLVED | Noted: 2020-07-03 | Resolved: 2022-03-29

## 2022-03-29 PROBLEM — R73.9 HYPERGLYCEMIA: Status: RESOLVED | Noted: 2018-03-08 | Resolved: 2022-03-29

## 2022-03-29 PROBLEM — D72.829 LEUKOCYTOSIS: Status: RESOLVED | Noted: 2019-07-17 | Resolved: 2022-03-29

## 2022-03-29 PROBLEM — T82.7XXA ICD (IMPLANTABLE CARDIOVERTER-DEFIBRILLATOR) INFECTION: Status: RESOLVED | Noted: 2020-07-01 | Resolved: 2022-03-29

## 2022-03-29 PROCEDURE — 93260 PRGRMG DEV EVAL IMPLTBL SYS: CPT | Mod: 26,,, | Performed by: INTERNAL MEDICINE

## 2022-03-29 PROCEDURE — 93793 ANTICOAG MGMT PT WARFARIN: CPT | Mod: S$GLB,,,

## 2022-03-29 PROCEDURE — 99999 PR PBB SHADOW E&M-EST. PATIENT-LVL IV: CPT | Mod: PBBFAC,,, | Performed by: INTERNAL MEDICINE

## 2022-03-29 PROCEDURE — 93793 PR ANTICOAGULANT MGMT FOR PT TAKING WARFARIN: ICD-10-PCS | Mod: S$GLB,,,

## 2022-03-29 PROCEDURE — 93260 CARDIAC DEVICE CHECK - IN CLINIC & HOSPITAL: ICD-10-PCS | Mod: 26,,, | Performed by: INTERNAL MEDICINE

## 2022-03-29 PROCEDURE — 99214 OFFICE O/P EST MOD 30 MIN: CPT | Mod: S$GLB,,, | Performed by: INTERNAL MEDICINE

## 2022-03-29 PROCEDURE — 99999 PR PBB SHADOW E&M-EST. PATIENT-LVL IV: ICD-10-PCS | Mod: PBBFAC,,, | Performed by: INTERNAL MEDICINE

## 2022-03-29 PROCEDURE — 99214 PR OFFICE/OUTPT VISIT, EST, LEVL IV, 30-39 MIN: ICD-10-PCS | Mod: S$GLB,,, | Performed by: INTERNAL MEDICINE

## 2022-03-29 PROCEDURE — 93260 PRGRMG DEV EVAL IMPLTBL SYS: CPT

## 2022-03-29 NOTE — PROGRESS NOTES
Subjective:      Chief Complaint: Follow-up for hypothyroidism    HPI: Tim Richards is a 56 y.o. 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    With regards to amiodarone induced hypothyroidism:    He initially developed amiodarone-induced hyperthyroidism (Type 2 AIT) in 7/2019. He required a prolonged course of PDN and MMI, then subsequently developed hypothyroidism in 5/2020. I have been titrating his doses based on serial TFT measurements.     He generally feels well and has no symptomatic complaints at the moment.    Currently taking:  Generic levothyroxine 112 mcg daily.    He takes it appropriately on an empty stomach and waits at least 30 minutes prior to eating.  He does take iron supplements, which he usually takes in the afternoons and at least 4 hours away from levothyroxine.    Lab Results   Component Value Date    TSH 4.079 (H) 03/17/2022    TSH 6.288 (H) 01/19/2022    TSH 3.914 11/11/2021    TSH 3.411 05/05/2021    TSH 3.639 04/20/2021    FREET4 1.31 03/17/2022    FREET4 1.25 01/19/2022    FREET4 1.21 02/24/2021    FREET4 1.33 01/29/2021    FREET4 1.37 12/30/2020       Reviewed past medical, family, social history and updated as appropriate.      Objective:     Physical Exam  Vitals and nursing note reviewed.   Constitutional:       General: He is not in acute distress.     Appearance: He is well-developed.   HENT:      Head: Normocephalic and atraumatic.   Eyes:      General:         Right eye: No discharge.         Left eye: No  discharge.      Conjunctiva/sclera: Conjunctivae normal.   Neck:      Thyroid: No thyroid mass, thyromegaly or thyroid tenderness.      Trachea: No tracheal deviation.   Cardiovascular:      Comments: +VAD hum  Pulmonary:      Effort: Pulmonary effort is normal. No respiratory distress.   Musculoskeletal:      Comments: No digital clubbing or extremity cyanosis   Neurological:      General: No focal deficit present.      Mental Status: He is alert and oriented to person, place, and time.      Coordination: Coordination normal.   Psychiatric:         Mood and Affect: Mood normal.         Behavior: Behavior normal.           Wt Readings from Last 10 Encounters:   03/29/22 1108 120.7 kg (265 lb 15.8 oz)   03/17/22 1328 116.3 kg (256 lb 4.8 oz)   01/21/22 1608 121 kg (266 lb 12.1 oz)   01/19/22 1311 121.6 kg (268 lb)   11/11/21 1502 124.3 kg (274 lb)   11/11/21 1330 124.1 kg (273 lb 8 oz)   09/16/21 1324 124.3 kg (274 lb)   07/07/21 1327 124.7 kg (275 lb)   05/05/21 1534 127 kg (280 lb)   05/05/21 1611 123.4 kg (272 lb)       Lab Results   Component Value Date    HGBA1C 5.4 04/26/2021    HGBA1C 5.5 03/08/2018    HGBA1C 5.4 01/23/2018    HGBA1C 5.6 08/04/2016     Lab Results   Component Value Date    CHOL 170 11/11/2021    CHOL 230 (H) 05/05/2021    HDL 55 11/11/2021    HDL 43 05/05/2021    LDLCALC 93.2 11/11/2021    LDLCALC 143.2 05/05/2021    TRIG 109 11/11/2021    TRIG 219 (H) 05/05/2021    CHOLHDL 32.4 11/11/2021    CHOLHDL 18.7 (L) 05/05/2021     Lab Results   Component Value Date     03/17/2022    K 4.0 03/17/2022     03/17/2022    CO2 26 03/17/2022    GLU 85 03/17/2022    BUN 18 03/17/2022    CREATININE 2.2 (H) 03/17/2022    CALCIUM 9.7 03/17/2022    PROT 8.5 (H) 03/17/2022    ALBUMIN 3.9 03/17/2022    BILITOT 0.7 03/17/2022    ALKPHOS 132 03/17/2022    AST 27 03/17/2022    ALT 23 03/17/2022    ANIONGAP 11 03/17/2022    ESTGFRAFRICA 37.6 (A) 03/17/2022    EGFRNONAA 32.5 (A) 03/17/2022    TSH 4.079 (H)  03/17/2022      No results found for: MICALBCREAT    Assessment/Plan:     amiodarone induced hypothyrodism  Will aim to keep his TSH at the upper end of the normal range given the history of cardiovascular disease.  Avoid overtreatment which may exacerbate cardiac arrhythmias.    Continue levothyroxine 112 mcg daily.  Recheck TSH every three months.    Heart replaced by heart assist device  See above.      RTC yearly.  Labs in three months.

## 2022-03-29 NOTE — ASSESSMENT & PLAN NOTE
Will aim to keep his TSH at the upper end of the normal range given the history of cardiovascular disease.  Avoid overtreatment which may exacerbate cardiac arrhythmias.    Continue levothyroxine 112 mcg daily.  Recheck TSH every three months.

## 2022-03-30 ENCOUNTER — TELEPHONE (OUTPATIENT)
Dept: TRANSPLANT | Facility: CLINIC | Age: 57
End: 2022-03-30
Payer: COMMERCIAL

## 2022-03-30 NOTE — TELEPHONE ENCOUNTER
Labs reviewed with pt.  He confirms taking the decreased dose of torsemide at 20 mg daily.  He feels like he has a little bit more fluid on.  I told him to let me know if he feels like he needs to increase his dose again.  Pt verbalized understanding and agreement.

## 2022-04-01 ENCOUNTER — PATIENT MESSAGE (OUTPATIENT)
Dept: CARDIOLOGY | Facility: CLINIC | Age: 57
End: 2022-04-01
Payer: COMMERCIAL

## 2022-04-04 ENCOUNTER — PATIENT MESSAGE (OUTPATIENT)
Dept: CARDIOLOGY | Facility: CLINIC | Age: 57
End: 2022-04-04
Payer: COMMERCIAL

## 2022-04-04 NOTE — PROGRESS NOTES
[facility]22 [facility]  Patient sent message on [facility] that he has been out of town and to reschedule him for INR for Tuesday or Wednesday.  INR rescheduled for 4/5/22 and sent reply to patient.

## 2022-04-05 ENCOUNTER — PATIENT MESSAGE (OUTPATIENT)
Dept: TRANSPLANT | Facility: CLINIC | Age: 57
End: 2022-04-05
Payer: COMMERCIAL

## 2022-04-05 ENCOUNTER — ANTI-COAG VISIT (OUTPATIENT)
Dept: CARDIOLOGY | Facility: CLINIC | Age: 57
End: 2022-04-05
Payer: COMMERCIAL

## 2022-04-05 ENCOUNTER — LAB VISIT (OUTPATIENT)
Dept: LAB | Facility: HOSPITAL | Age: 57
End: 2022-04-05
Attending: INTERNAL MEDICINE
Payer: COMMERCIAL

## 2022-04-05 DIAGNOSIS — Z95.811 LVAD (LEFT VENTRICULAR ASSIST DEVICE) PRESENT: ICD-10-CM

## 2022-04-05 DIAGNOSIS — Z79.01 ANTICOAGULATION MONITORING, INR RANGE 2-3: ICD-10-CM

## 2022-04-05 DIAGNOSIS — Z95.811 LVAD (LEFT VENTRICULAR ASSIST DEVICE) PRESENT: Primary | ICD-10-CM

## 2022-04-05 LAB
INR PPP: 2.7 (ref 0.8–1.2)
PROTHROMBIN TIME: 27.9 SEC (ref 9–12.5)

## 2022-04-05 PROCEDURE — 85610 PROTHROMBIN TIME: CPT | Performed by: INTERNAL MEDICINE

## 2022-04-05 PROCEDURE — 36415 COLL VENOUS BLD VENIPUNCTURE: CPT | Performed by: INTERNAL MEDICINE

## 2022-04-05 PROCEDURE — 93793 PR ANTICOAGULANT MGMT FOR PT TAKING WARFARIN: ICD-10-PCS | Mod: S$GLB,,,

## 2022-04-05 PROCEDURE — 93793 ANTICOAG MGMT PT WARFARIN: CPT | Mod: S$GLB,,,

## 2022-04-12 ENCOUNTER — LAB VISIT (OUTPATIENT)
Dept: LAB | Facility: HOSPITAL | Age: 57
End: 2022-04-12
Attending: INTERNAL MEDICINE
Payer: COMMERCIAL

## 2022-04-12 ENCOUNTER — ANTI-COAG VISIT (OUTPATIENT)
Dept: CARDIOLOGY | Facility: CLINIC | Age: 57
End: 2022-04-12
Payer: COMMERCIAL

## 2022-04-12 ENCOUNTER — PATIENT MESSAGE (OUTPATIENT)
Dept: CARDIOLOGY | Facility: CLINIC | Age: 57
End: 2022-04-12

## 2022-04-12 DIAGNOSIS — Z95.811 LVAD (LEFT VENTRICULAR ASSIST DEVICE) PRESENT: Primary | ICD-10-CM

## 2022-04-12 DIAGNOSIS — Z95.811 LVAD (LEFT VENTRICULAR ASSIST DEVICE) PRESENT: ICD-10-CM

## 2022-04-12 DIAGNOSIS — Z79.01 ANTICOAGULATION MONITORING, INR RANGE 2-3: ICD-10-CM

## 2022-04-12 LAB
INR PPP: 2.3 (ref 0.8–1.2)
PROTHROMBIN TIME: 23.2 SEC (ref 9–12.5)

## 2022-04-12 PROCEDURE — 36415 COLL VENOUS BLD VENIPUNCTURE: CPT | Performed by: INTERNAL MEDICINE

## 2022-04-12 PROCEDURE — 93793 PR ANTICOAGULANT MGMT FOR PT TAKING WARFARIN: ICD-10-PCS | Mod: S$GLB,,,

## 2022-04-12 PROCEDURE — 85610 PROTHROMBIN TIME: CPT | Performed by: INTERNAL MEDICINE

## 2022-04-12 PROCEDURE — 93793 ANTICOAG MGMT PT WARFARIN: CPT | Mod: S$GLB,,,

## 2022-04-18 ENCOUNTER — PATIENT MESSAGE (OUTPATIENT)
Dept: TRANSPLANT | Facility: CLINIC | Age: 57
End: 2022-04-18
Payer: COMMERCIAL

## 2022-04-19 ENCOUNTER — PATIENT MESSAGE (OUTPATIENT)
Dept: CARDIOLOGY | Facility: CLINIC | Age: 57
End: 2022-04-19

## 2022-04-19 ENCOUNTER — ANTI-COAG VISIT (OUTPATIENT)
Dept: CARDIOLOGY | Facility: CLINIC | Age: 57
End: 2022-04-19
Payer: COMMERCIAL

## 2022-04-19 ENCOUNTER — LAB VISIT (OUTPATIENT)
Dept: LAB | Facility: HOSPITAL | Age: 57
End: 2022-04-19
Attending: INTERNAL MEDICINE
Payer: COMMERCIAL

## 2022-04-19 ENCOUNTER — PATIENT MESSAGE (OUTPATIENT)
Dept: TRANSPLANT | Facility: CLINIC | Age: 57
End: 2022-04-19
Payer: COMMERCIAL

## 2022-04-19 DIAGNOSIS — Z95.811 LVAD (LEFT VENTRICULAR ASSIST DEVICE) PRESENT: ICD-10-CM

## 2022-04-19 DIAGNOSIS — Z79.01 ANTICOAGULATION MONITORING, INR RANGE 2-3: ICD-10-CM

## 2022-04-19 DIAGNOSIS — Z95.811 LVAD (LEFT VENTRICULAR ASSIST DEVICE) PRESENT: Primary | ICD-10-CM

## 2022-04-19 LAB
INR PPP: 2.9 (ref 0.8–1.2)
PROTHROMBIN TIME: 29.3 SEC (ref 9–12.5)

## 2022-04-19 PROCEDURE — 93793 PR ANTICOAGULANT MGMT FOR PT TAKING WARFARIN: ICD-10-PCS | Mod: S$GLB,,,

## 2022-04-19 PROCEDURE — 85610 PROTHROMBIN TIME: CPT | Performed by: INTERNAL MEDICINE

## 2022-04-19 PROCEDURE — 93793 ANTICOAG MGMT PT WARFARIN: CPT | Mod: S$GLB,,,

## 2022-04-19 PROCEDURE — 36415 COLL VENOUS BLD VENIPUNCTURE: CPT | Performed by: INTERNAL MEDICINE

## 2022-04-19 NOTE — Clinical Note
Grounding pads placed on patient's bilateral posterior thighs   I spoke to patient. Patient was calling for ct scan results from 4/13/22. Mr Sherman stated he spoke with his PCP and Dr Snyder mentioned he may need a PET scan. I let patient know I would send a message to Dr Shelby for his review and to advise. Patient verbalized understanding.

## 2022-04-26 ENCOUNTER — ANTI-COAG VISIT (OUTPATIENT)
Dept: CARDIOLOGY | Facility: CLINIC | Age: 57
End: 2022-04-26
Payer: COMMERCIAL

## 2022-04-26 ENCOUNTER — LAB VISIT (OUTPATIENT)
Dept: LAB | Facility: HOSPITAL | Age: 57
End: 2022-04-26
Attending: INTERNAL MEDICINE
Payer: COMMERCIAL

## 2022-04-26 DIAGNOSIS — Z79.01 ANTICOAGULATION MONITORING, INR RANGE 2-3: ICD-10-CM

## 2022-04-26 DIAGNOSIS — Z95.811 LVAD (LEFT VENTRICULAR ASSIST DEVICE) PRESENT: Primary | ICD-10-CM

## 2022-04-26 DIAGNOSIS — Z95.811 LVAD (LEFT VENTRICULAR ASSIST DEVICE) PRESENT: ICD-10-CM

## 2022-04-26 LAB
INR PPP: 4 (ref 0.8–1.2)
PROTHROMBIN TIME: 40.2 SEC (ref 9–12.5)

## 2022-04-26 PROCEDURE — 93793 PR ANTICOAGULANT MGMT FOR PT TAKING WARFARIN: ICD-10-PCS | Mod: S$GLB,,,

## 2022-04-26 PROCEDURE — 93793 ANTICOAG MGMT PT WARFARIN: CPT | Mod: S$GLB,,,

## 2022-04-26 PROCEDURE — 36415 COLL VENOUS BLD VENIPUNCTURE: CPT | Performed by: INTERNAL MEDICINE

## 2022-04-26 PROCEDURE — 85610 PROTHROMBIN TIME: CPT | Performed by: INTERNAL MEDICINE

## 2022-04-26 NOTE — PROGRESS NOTES
Yes I drank a lot of punch with cranberry Sunday and ate crawfish. Also it had Bacardi alcohol as well.- patient stated

## 2022-04-27 ENCOUNTER — PATIENT MESSAGE (OUTPATIENT)
Dept: TRANSPLANT | Facility: CLINIC | Age: 57
End: 2022-04-27
Payer: COMMERCIAL

## 2022-04-28 ENCOUNTER — ANTI-COAG VISIT (OUTPATIENT)
Dept: CARDIOLOGY | Facility: CLINIC | Age: 57
End: 2022-04-28
Payer: COMMERCIAL

## 2022-04-28 ENCOUNTER — LAB VISIT (OUTPATIENT)
Dept: LAB | Facility: HOSPITAL | Age: 57
End: 2022-04-28
Attending: INTERNAL MEDICINE
Payer: COMMERCIAL

## 2022-04-28 DIAGNOSIS — Z95.811 LVAD (LEFT VENTRICULAR ASSIST DEVICE) PRESENT: ICD-10-CM

## 2022-04-28 DIAGNOSIS — Z95.811 LVAD (LEFT VENTRICULAR ASSIST DEVICE) PRESENT: Primary | ICD-10-CM

## 2022-04-28 DIAGNOSIS — Z79.01 ANTICOAGULATION MONITORING, INR RANGE 2-3: ICD-10-CM

## 2022-04-28 LAB
INR PPP: 3.5 (ref 0.8–1.2)
PROTHROMBIN TIME: 35.5 SEC (ref 9–12.5)

## 2022-04-28 PROCEDURE — 93793 PR ANTICOAGULANT MGMT FOR PT TAKING WARFARIN: ICD-10-PCS | Mod: S$GLB,,,

## 2022-04-28 PROCEDURE — 93793 ANTICOAG MGMT PT WARFARIN: CPT | Mod: S$GLB,,,

## 2022-04-28 PROCEDURE — 85610 PROTHROMBIN TIME: CPT | Performed by: INTERNAL MEDICINE

## 2022-04-28 PROCEDURE — 36415 COLL VENOUS BLD VENIPUNCTURE: CPT | Performed by: INTERNAL MEDICINE

## 2022-05-03 ENCOUNTER — LAB VISIT (OUTPATIENT)
Dept: LAB | Facility: HOSPITAL | Age: 57
End: 2022-05-03
Attending: INTERNAL MEDICINE
Payer: COMMERCIAL

## 2022-05-03 ENCOUNTER — HOSPITAL ENCOUNTER (OUTPATIENT)
Dept: PULMONOLOGY | Facility: CLINIC | Age: 57
Discharge: HOME | End: 2022-05-03
Payer: COMMERCIAL

## 2022-05-03 ENCOUNTER — ANTI-COAG VISIT (OUTPATIENT)
Dept: CARDIOLOGY | Facility: CLINIC | Age: 57
End: 2022-05-03
Payer: COMMERCIAL

## 2022-05-03 VITALS — WEIGHT: 265 LBS | BODY MASS INDEX: 35.12 KG/M2 | HEIGHT: 73 IN

## 2022-05-03 DIAGNOSIS — Z79.01 ANTICOAGULATION MONITORING, INR RANGE 2-3: ICD-10-CM

## 2022-05-03 DIAGNOSIS — Z95.811 LVAD (LEFT VENTRICULAR ASSIST DEVICE) PRESENT: ICD-10-CM

## 2022-05-03 DIAGNOSIS — Z95.811 LVAD (LEFT VENTRICULAR ASSIST DEVICE) PRESENT: Primary | ICD-10-CM

## 2022-05-03 LAB
INR PPP: 2.4 (ref 0.8–1.2)
PROTHROMBIN TIME: 23.3 SEC (ref 9–12.5)

## 2022-05-03 PROCEDURE — 36415 COLL VENOUS BLD VENIPUNCTURE: CPT | Performed by: INTERNAL MEDICINE

## 2022-05-03 PROCEDURE — 94618 PULMONARY STRESS TESTING: CPT | Mod: S$GLB,,, | Performed by: INTERNAL MEDICINE

## 2022-05-03 PROCEDURE — 85610 PROTHROMBIN TIME: CPT | Performed by: INTERNAL MEDICINE

## 2022-05-03 PROCEDURE — 93793 ANTICOAG MGMT PT WARFARIN: CPT | Mod: S$GLB,,,

## 2022-05-03 PROCEDURE — 93793 PR ANTICOAGULANT MGMT FOR PT TAKING WARFARIN: ICD-10-PCS | Mod: S$GLB,,,

## 2022-05-03 PROCEDURE — 94618 PULMONARY STRESS TESTING: ICD-10-PCS | Mod: S$GLB,,, | Performed by: INTERNAL MEDICINE

## 2022-05-03 NOTE — PROCEDURES
Tim Richards is a 56 y.o.  male patient, who presents for a 6 minute walk test ordered by MD Shae.  The diagnosis is  (LVAD).  The patient's BMI is 35 kg/m2.  Predicted distance (lower limit of normal) is 408.95 meters.      Test Results:    The test was completed without stopping.  The total time walked was 360 seconds.  During walking, the patient reported:  Dyspnea, Leg pain, Lightheadedness, Dizziness. The patient used no assistive devices  during testing.     05/03/2022---------Distance: 304.8 meters (1000 feet)     O2 Sat % Supplemental Oxygen Heart Rate Blood Pressure Renan Scale   Pre-exercise  (Resting) 95 % Room Air 81 bpm Unable to obtain (LVAD) 4   During Exercise 94 % Room Air 152 bpm   mmHg 9   Post-exercise  (Recovery) 97 %    90 bpm   mmHg       Recovery Time: 106 seconds (15 ft in 4.81 seconds)    Performing nurse/tech: Marty WRAY      PREVIOUS STUDY:   The patient had a previous study.  03/25/2021---------Distance: 304.8 meters (1000 feet)       O2 Sat % Supplemental Oxygen Heart Rate Blood Pressure Renan Scale   Pre-exercise  (Resting) 97 % Room Air 54 bpm Unable to obtain 2   During Exercise 98 % Room Air 69 bpm Unable to obtain 5-6   Post-exercise  (Recovery) 98 % Room Air  61 bpm              CLINICAL INTERPRETATION:  Six minute walk distance is 304.8 meters (1000 feet) with very, very heavy dyspnea.  During exercise, there was no significant desaturation while breathing room air.  Heart rate increased significantly with walking.  This may represent a tachycardic response to exercise.  The patient reported non-pulmonary symptoms during exercise.  Since the previous study in March 2021, exercise capacity is unchanged.  Based upon age and body mass index, exercise capacity is less than predicted.

## 2022-05-10 ENCOUNTER — LAB VISIT (OUTPATIENT)
Dept: LAB | Facility: HOSPITAL | Age: 57
End: 2022-05-10
Attending: INTERNAL MEDICINE
Payer: COMMERCIAL

## 2022-05-10 ENCOUNTER — ANTI-COAG VISIT (OUTPATIENT)
Dept: CARDIOLOGY | Facility: CLINIC | Age: 57
End: 2022-05-10
Payer: COMMERCIAL

## 2022-05-10 DIAGNOSIS — Z95.811 LVAD (LEFT VENTRICULAR ASSIST DEVICE) PRESENT: ICD-10-CM

## 2022-05-10 DIAGNOSIS — Z79.01 ANTICOAGULATION MONITORING, INR RANGE 2-3: ICD-10-CM

## 2022-05-10 DIAGNOSIS — Z95.811 LVAD (LEFT VENTRICULAR ASSIST DEVICE) PRESENT: Primary | ICD-10-CM

## 2022-05-10 LAB
INR PPP: 3.2 (ref 0.8–1.2)
PROTHROMBIN TIME: 32.5 SEC (ref 9–12.5)

## 2022-05-10 PROCEDURE — 36415 COLL VENOUS BLD VENIPUNCTURE: CPT | Performed by: INTERNAL MEDICINE

## 2022-05-10 PROCEDURE — 93793 ANTICOAG MGMT PT WARFARIN: CPT | Mod: S$GLB,,,

## 2022-05-10 PROCEDURE — 85610 PROTHROMBIN TIME: CPT | Performed by: INTERNAL MEDICINE

## 2022-05-10 PROCEDURE — 93793 PR ANTICOAGULANT MGMT FOR PT TAKING WARFARIN: ICD-10-PCS | Mod: S$GLB,,,

## 2022-05-12 NOTE — PROGRESS NOTES
INR not at goal. Medications, chart, and patient findings reviewed. See calendar for adjustments to dose and follow up plan. INR goal is 2.0-3.0 as set by VAD team.

## 2022-05-12 NOTE — PROGRESS NOTES
Patient wife questioned and reported that patient range is 2.5 - 3.5 .  No other changes reported.

## 2022-05-13 ENCOUNTER — ANTI-COAG VISIT (OUTPATIENT)
Dept: CARDIOLOGY | Facility: CLINIC | Age: 57
End: 2022-05-13
Payer: COMMERCIAL

## 2022-05-13 ENCOUNTER — LAB VISIT (OUTPATIENT)
Dept: LAB | Facility: HOSPITAL | Age: 57
End: 2022-05-13
Attending: INTERNAL MEDICINE
Payer: COMMERCIAL

## 2022-05-13 DIAGNOSIS — Z95.811 LVAD (LEFT VENTRICULAR ASSIST DEVICE) PRESENT: ICD-10-CM

## 2022-05-13 DIAGNOSIS — Z79.01 ANTICOAGULATION MONITORING, INR RANGE 2-3: ICD-10-CM

## 2022-05-13 DIAGNOSIS — Z95.811 LVAD (LEFT VENTRICULAR ASSIST DEVICE) PRESENT: Primary | ICD-10-CM

## 2022-05-13 DIAGNOSIS — E79.0 HYPERURICEMIA: ICD-10-CM

## 2022-05-13 LAB
INR PPP: 3.1 (ref 0.8–1.2)
PROTHROMBIN TIME: 30.9 SEC (ref 9–12.5)

## 2022-05-13 PROCEDURE — 93793 ANTICOAG MGMT PT WARFARIN: CPT | Mod: S$GLB,,,

## 2022-05-13 PROCEDURE — 93793 PR ANTICOAGULANT MGMT FOR PT TAKING WARFARIN: ICD-10-PCS | Mod: S$GLB,,,

## 2022-05-13 PROCEDURE — 85610 PROTHROMBIN TIME: CPT | Performed by: INTERNAL MEDICINE

## 2022-05-13 PROCEDURE — 36415 COLL VENOUS BLD VENIPUNCTURE: CPT | Performed by: INTERNAL MEDICINE

## 2022-05-13 NOTE — PROGRESS NOTES
Patient advised of dose instructions and verbalized understanding.  Patient rescheduled appointment from 5/17 to 5/19

## 2022-05-19 ENCOUNTER — OFFICE VISIT (OUTPATIENT)
Dept: TRANSPLANT | Facility: CLINIC | Age: 57
End: 2022-05-19
Attending: INTERNAL MEDICINE
Payer: COMMERCIAL

## 2022-05-19 ENCOUNTER — ANTI-COAG VISIT (OUTPATIENT)
Dept: CARDIOLOGY | Facility: CLINIC | Age: 57
End: 2022-05-19
Payer: COMMERCIAL

## 2022-05-19 ENCOUNTER — HOSPITAL ENCOUNTER (OUTPATIENT)
Dept: CARDIOLOGY | Facility: HOSPITAL | Age: 57
Discharge: HOME | End: 2022-05-19
Attending: INTERNAL MEDICINE
Payer: COMMERCIAL

## 2022-05-19 ENCOUNTER — CLINICAL SUPPORT (OUTPATIENT)
Dept: TRANSPLANT | Facility: CLINIC | Age: 57
End: 2022-05-19
Payer: COMMERCIAL

## 2022-05-19 VITALS — WEIGHT: 253 LBS | BODY MASS INDEX: 33.53 KG/M2 | HEIGHT: 73 IN | TEMPERATURE: 99 F | SYSTOLIC BLOOD PRESSURE: 88 MMHG

## 2022-05-19 VITALS — SYSTOLIC BLOOD PRESSURE: 88 MMHG | BODY MASS INDEX: 35.12 KG/M2 | HEART RATE: 70 BPM | HEIGHT: 73 IN | WEIGHT: 265 LBS

## 2022-05-19 DIAGNOSIS — Z79.01 ANTICOAGULATION MONITORING, INR RANGE 2-3: ICD-10-CM

## 2022-05-19 DIAGNOSIS — Z95.811 LVAD (LEFT VENTRICULAR ASSIST DEVICE) PRESENT: Primary | ICD-10-CM

## 2022-05-19 DIAGNOSIS — Z95.811 LVAD (LEFT VENTRICULAR ASSIST DEVICE) PRESENT: Primary | Chronic | ICD-10-CM

## 2022-05-19 DIAGNOSIS — I49.01 VF (VENTRICULAR FIBRILLATION): ICD-10-CM

## 2022-05-19 DIAGNOSIS — Z95.811 LVAD (LEFT VENTRICULAR ASSIST DEVICE) PRESENT: ICD-10-CM

## 2022-05-19 DIAGNOSIS — I42.0 DILATED CARDIOMYOPATHY: ICD-10-CM

## 2022-05-19 DIAGNOSIS — E03.2 HYPOTHYROIDISM DUE TO AMIODARONE: ICD-10-CM

## 2022-05-19 DIAGNOSIS — Z79.899 HIGH RISK MEDICATIONS (NOT ANTICOAGULANTS) LONG-TERM USE: ICD-10-CM

## 2022-05-19 DIAGNOSIS — N18.32 STAGE 3B CHRONIC KIDNEY DISEASE: Chronic | ICD-10-CM

## 2022-05-19 DIAGNOSIS — I50.42 CHRONIC COMBINED SYSTOLIC AND DIASTOLIC HEART FAILURE: ICD-10-CM

## 2022-05-19 DIAGNOSIS — I47.20 VT (VENTRICULAR TACHYCARDIA): Chronic | ICD-10-CM

## 2022-05-19 DIAGNOSIS — T46.2X1A HYPOTHYROIDISM DUE TO AMIODARONE: ICD-10-CM

## 2022-05-19 DIAGNOSIS — I13.0 HYPERTENSIVE CARDIOVASCULAR-RENAL DISEASE, STAGE 1-4 OR UNSPECIFIED CHRONIC KIDNEY DISEASE, WITH HEART FAILURE: ICD-10-CM

## 2022-05-19 LAB
ASCENDING AORTA: 3.89 CM
BSA FOR ECHO PROCEDURE: 2.49 M2
CV ECHO LV RWT: 0.38 CM
DOP CALC LVOT AREA: 5.1 CM2
DOP CALC LVOT DIAMETER: 2.55 CM
E WAVE DECELERATION TIME: 183.05 MSEC
E/A RATIO: 0.78
E/E' RATIO: 10.14 M/S
ECHO LV POSTERIOR WALL: 1.54 CM (ref 0.6–1.1)
EJECTION FRACTION: 10 %
FRACTIONAL SHORTENING: 10 % (ref 28–44)
INTERVENTRICULAR SEPTUM: 0.96 CM (ref 0.6–1.1)
LA MAJOR: 6.96 CM
LA MINOR: 6.83 CM
LA WIDTH: 5.8 CM
LEFT ATRIUM SIZE: 4.54 CM
LEFT ATRIUM VOLUME INDEX MOD: 71.5 ML/M2
LEFT ATRIUM VOLUME INDEX: 63.5 ML/M2
LEFT ATRIUM VOLUME MOD: 173.66 CM3
LEFT ATRIUM VOLUME: 154.31 CM3
LEFT INTERNAL DIMENSION IN SYSTOLE: 7.29 CM (ref 2.1–4)
LEFT VENTRICLE DIASTOLIC VOLUME INDEX: 144.49 ML/M2
LEFT VENTRICLE DIASTOLIC VOLUME: 351.12 ML
LEFT VENTRICLE MASS INDEX: 225 G/M2
LEFT VENTRICLE SYSTOLIC VOLUME INDEX: 115.2 ML/M2
LEFT VENTRICLE SYSTOLIC VOLUME: 279.91 ML
LEFT VENTRICULAR INTERNAL DIMENSION IN DIASTOLE: 8.07 CM (ref 3.5–6)
LEFT VENTRICULAR MASS: 545.87 G
LV LATERAL E/E' RATIO: 14.2 M/S
LV SEPTAL E/E' RATIO: 7.89 M/S
MV PEAK A VEL: 0.91 M/S
MV PEAK E VEL: 0.71 M/S
MV STENOSIS PRESSURE HALF TIME: 53.08 MS
MV VALVE AREA P 1/2 METHOD: 4.14 CM2
PISA TR MAX VEL: 2.14 M/S
RA MAJOR: 6.03 CM
RA PRESSURE: 8 MMHG
RA WIDTH: 4.81 CM
RIGHT VENTRICULAR END-DIASTOLIC DIMENSION: 3.65 CM
RV TISSUE DOPPLER FREE WALL SYSTOLIC VELOCITY 1 (APICAL 4 CHAMBER VIEW): 7.03 CM/S
SINUS: 3.79 CM
STJ: 3.21 CM
TDI LATERAL: 0.05 M/S
TDI SEPTAL: 0.09 M/S
TDI: 0.07 M/S
TR MAX PG: 18 MMHG
TRICUSPID ANNULAR PLANE SYSTOLIC EXCURSION: 1.66 CM
TV REST PULMONARY ARTERY PRESSURE: 26 MMHG

## 2022-05-19 PROCEDURE — 93306 TTE W/DOPPLER COMPLETE: CPT | Mod: 26,,, | Performed by: INTERNAL MEDICINE

## 2022-05-19 PROCEDURE — 99214 OFFICE O/P EST MOD 30 MIN: CPT | Mod: 25,S$GLB,, | Performed by: INTERNAL MEDICINE

## 2022-05-19 PROCEDURE — 93306 TTE W/DOPPLER COMPLETE: CPT

## 2022-05-19 PROCEDURE — 93750 INTERROGATION VAD IN PERSON: CPT | Mod: S$GLB,,, | Performed by: INTERNAL MEDICINE

## 2022-05-19 PROCEDURE — 99999 PR PBB SHADOW E&M-EST. PATIENT-LVL V: ICD-10-PCS | Mod: PBBFAC,,, | Performed by: INTERNAL MEDICINE

## 2022-05-19 PROCEDURE — 93750 OP LVAD INTERROGATION: ICD-10-PCS | Mod: S$GLB,,, | Performed by: INTERNAL MEDICINE

## 2022-05-19 PROCEDURE — 99214 PR OFFICE/OUTPT VISIT, EST, LEVL IV, 30-39 MIN: ICD-10-PCS | Mod: 25,S$GLB,, | Performed by: INTERNAL MEDICINE

## 2022-05-19 PROCEDURE — 99999 PR PBB SHADOW E&M-EST. PATIENT-LVL I: ICD-10-PCS | Mod: PBBFAC,,,

## 2022-05-19 PROCEDURE — 93306 ECHO (CUPID ONLY): ICD-10-PCS | Mod: 26,,, | Performed by: INTERNAL MEDICINE

## 2022-05-19 PROCEDURE — 99999 PR PBB SHADOW E&M-EST. PATIENT-LVL I: CPT | Mod: PBBFAC,,,

## 2022-05-19 PROCEDURE — 99999 PR PBB SHADOW E&M-EST. PATIENT-LVL V: CPT | Mod: PBBFAC,,, | Performed by: INTERNAL MEDICINE

## 2022-05-19 RX ORDER — POTASSIUM CHLORIDE 20 MEQ/1
20 TABLET, EXTENDED RELEASE ORAL 2 TIMES DAILY
Qty: 180 TABLET | Refills: 3 | Status: SHIPPED | OUTPATIENT
Start: 2022-05-19 | End: 2022-06-27

## 2022-05-19 RX ORDER — NEBIVOLOL 5 MG/1
5 TABLET ORAL DAILY
Qty: 30 TABLET | Refills: 11 | Status: SHIPPED | OUTPATIENT
Start: 2022-05-19 | End: 2022-06-01

## 2022-05-19 RX ORDER — ALLOPURINOL 100 MG/1
TABLET ORAL
Qty: 90 TABLET | Refills: 3 | Status: ON HOLD | OUTPATIENT
Start: 2022-05-19 | End: 2022-09-01 | Stop reason: HOSPADM

## 2022-05-19 NOTE — PROGRESS NOTES
Subjective:   Patient ID:  Tim Richards is a 56 y.o. male who presents for LVAD followup visit.    Implant Date: 3/8/2018  Initials:RBB     Heartmate II RPM 9800  3/9/18 outflow graft changed     INR goal: 2-3   NO Bridge with heparin  Antiplatelets: stopped on 7/2019 due to GIB.   ASA 81 mg restarted 4/23/2020     GIB: 7/16/19 (10 units of blood)    TXP SACHI INTERROGATIONS 5/19/2022   Type HeartMate II   Flow 6.0   Speed 9800   PI 3.3   Power (Jackson) 6.6   LSL 9400   Pulsatility No Pulse   Interrogation of Ventricular assist device was performed with physician analysis of device parameters and review of device function. I have personally reviewed the interrogation findings and agree with findings as stated.     HPI  55 yo BM with stage D CHF due to NICM underwent HM 2 implant 3/8/18 and exchange of outflow graft 3/9/18 comes for his regular VAD visit.  He reports last ICD firing was Dec 2021.  Cut grass before coming here today and has been doing well with very mild COLEMAN, fatigue is an issue.  In March 2022 he reduced carvedilol from 3.125 mg BID to daily for lethargy/listlessness.      Review of Systems   Constitutional: Positive for malaise/fatigue and weight loss (he lost 12#over past year on purpose). Negative for chills, fever, night sweats and weight gain.   HENT: Negative for congestion, hearing loss, hoarse voice, nosebleeds and sore throat.    Eyes: Negative for double vision, pain, vision loss in left eye and vision loss in right eye.   Cardiovascular: Positive for dyspnea on exertion (mild). Negative for chest pain, leg swelling, near-syncope, orthopnea, paroxysmal nocturnal dyspnea and syncope.   Respiratory: Negative for cough, hemoptysis, shortness of breath, sleep disturbances due to breathing, snoring, sputum production and wheezing.    Endocrine: Negative for cold intolerance, heat intolerance, polydipsia and polyuria.   Hematologic/Lymphatic: Negative for bleeding problem. Does not  "bruise/bleed easily.   Musculoskeletal: Negative for falls, muscle cramps, myalgias and neck pain.   Gastrointestinal: Negative for bloating, abdominal pain, change in bowel habit, constipation, diarrhea, hematemesis, hematochezia, jaundice, melena and nausea.   Genitourinary: Negative for bladder incontinence, dysuria, frequency, hematuria, hesitancy, incomplete emptying and urgency.   Neurological: Positive for dizziness, headaches (sometimes) and light-headedness. Negative for brief paralysis, focal weakness, numbness, paresthesias, seizures and weakness.   Psychiatric/Behavioral: Negative for depression and memory loss. The patient has insomnia (Controlled with Ambien) and is nervous/anxious.        Objective:   Blood pressure (!) 88/0, temperature 99 °F (37.2 °C), temperature source Oral, height 6' 1" (1.854 m), weight 114.8 kg (253 lb).body mass index is 33.38 kg/m².  Doppler: 88  Physical Exam  Constitutional:       General: He is not in acute distress.     Appearance: He is well-developed. He is not ill-appearing, toxic-appearing or diaphoretic.      Comments: BP (!) 88/0 (BP Location: Left arm, Patient Position: Sitting) Comment (BP Method): doppler  Temp 99 °F (37.2 °C) (Oral)   Ht 6' 1" (1.854 m)   Wt 114.8 kg (253 lb)   BMI 33.38 kg/m²   WD, WN BM in NAD   HENT:      Head: Normocephalic and atraumatic.   Eyes:      General: No scleral icterus.        Right eye: No discharge.         Left eye: No discharge.      Conjunctiva/sclera: Conjunctivae normal.   Neck:      Thyroid: No thyromegaly.      Vascular: No JVD.      Trachea: No tracheal deviation.      Comments: JVP not elevated  Cardiovascular:      Comments: Normal LVAD sounds; DL 1  Pulmonary:      Effort: Pulmonary effort is normal. No respiratory distress.      Breath sounds: Normal breath sounds. No wheezing or rales.   Abdominal:      General: Bowel sounds are normal. There is no distension.      Palpations: Abdomen is soft. There is no mass. "      Tenderness: There is no abdominal tenderness. There is no guarding or rebound.   Musculoskeletal:         General: No swelling or tenderness.      Right lower leg: No edema.      Left lower leg: No edema.   Skin:     General: Skin is warm and dry.   Neurological:      General: No focal deficit present.      Mental Status: He is alert. Mental status is at baseline.   Psychiatric:         Mood and Affect: Mood normal.         Behavior: Behavior normal.         Thought Content: Thought content normal.         Judgment: Judgment normal.       Lab Results   Component Value Date     (H) 05/19/2022     05/19/2022    K 3.7 05/19/2022    MG 1.9 05/19/2022     05/19/2022    CO2 26 05/19/2022    PHOS 2.5 (L) 05/19/2022    BUN 18 05/19/2022    CREATININE 1.9 (H) 05/19/2022    GLU 79 05/19/2022    HGBA1C 5.4 04/26/2021    AST 28 05/19/2022    ALT 23 05/19/2022    ALBUMIN 3.9 05/19/2022    PROT 7.9 05/19/2022    BILITOT 0.8 05/19/2022    WBC 4.23 05/19/2022    HGB 13.3 (L) 05/19/2022    HCT 42.0 05/19/2022    HCT 38 07/10/2020     05/19/2022    INR 2.9 (H) 05/19/2022    INR 2.3 09/09/2021     (H) 05/19/2022    TSH 4.079 (H) 03/17/2022    H6OYDYA 9.5 11/11/2021    FREET4 1.31 03/17/2022    CHOL 130 05/19/2022    HDL 46 05/19/2022    LDLCALC 54.8 (L) 05/19/2022    TRIG 146 05/19/2022 5/19/2022 ECHO  · There is an LVAD present. Base speed is 9800 RPMs. The pump type is a Heartmate III. The interventricular septum appears to bow into the right ventricle. The aortic valve does not open.  · The left ventricle is severely enlarged with severe eccentric hypertrophy and severely decreased systolic function.  · The estimated ejection fraction is 10%.  · There is severe left ventricular global hypokinesis.  · There is abnormal septal wall motion.  · Grade I left ventricular diastolic dysfunction.  · Severe left atrial enlargement.  · Normal right ventricular size with mildly reduced right  ventricular systolic function.  · Moderate right atrial enlargement.  · Mild-to-moderate mitral regurgitation.  · Mild tricuspid regurgitation.  · Intermediate central venous pressure (8 mmHg).  · The estimated PA systolic pressure is 26 mmHg.  · The ascending aorta is dilated.      Assessment:      1. LVAD (left ventricular assist device) present    2. Chronic combined systolic and diastolic heart failure    3. Dilated cardiomyopathy    4. Stage 3b chronic kidney disease    5. Hypertensive cardiovascular-renal disease, stage 1-4 or unspecified chronic kidney disease, with heart failure    6. Anticoagulation monitoring, INR range 2-3    7. VT (ventricular tachycardia)    8. VF (ventricular fibrillation)    9. High risk medications (amiodarone) long-term use    10. amiodarone induced hypothyrodism        Plan:   I think taking coreg once a day is more likely to cause problem than be helpful.  Since he should benefit from beta blocker with arrhythmia as well as BP and CHF would like to start bisoprolol 5 mg or nebivolol 5 mg (approved in [place] for HF).  The latter is probably best tolerated BB available so will initiate that one.  If insurance won't cover then use bisoprolol.  This addition should improve BP control.    Patient is now NYHA II     Recommend 2 gram sodium restriction and 1500cc fluid restriction.  Encourage physical activity with graded exercise program.  Requested patient to weigh themselves daily, and to notify us if their weight increases by more than 3 lbs in 1 day or 5 lbs in 1 week.     Listed for transplant: No    UNOS Patient Status  Functional Status: 90% - Able to carry on normal activity: minor symptoms of disease  Physical Capacity: No Limitations  Working for Income: No  If no, reason not working: Disability

## 2022-05-19 NOTE — PROGRESS NOTES
Date of Implant with Heartmate II LVAD: 3/8/18    PATIENT ARRIVED IN CLINIC:  Ambulatory   Accompanied by: self    Vitals  Temperature, oral:   Temp Readings from Last 1 Encounters:   05/19/22 99 °F (37.2 °C) (Oral)     Blood Pressure:   BP Readings from Last 3 Encounters:   05/19/22 (!) 88/0   03/17/22 (!) 84/0   01/19/22 (!) 86/0        VAD Interrogation:  TXP SACHI INTERROGATIONS 5/19/2022 3/17/2022 1/19/2022   Type HeartMate II HeartMate II HeartMate II   Flow 6.0 4.2 5.6   Speed 9800 9800 9800   PI 3.3 3.4 2.6   Power (Jackson) 6.6 5.8 6.5   LSL 9400 9400 9400   Pulsatility No Pulse No Pulse No Pulse       History Log: [id number]  Problems / Issues / Alarms with VAD if any: None noted  VAD Sounds: HM2 Smooth    HCT:   Lab Results   Component Value Date    HCT 46.8 03/17/2022    HCT 38 07/10/2020       Complaints/reason for visit today: routine    VAD Binder With Patient: no   Reviewed VAD Numbers In Binder: no  Enrolled in Care Companion: no    Equipment:  Emergency Equipment With Patient: yes   Any Equipment Issues: None noted   It is medically necessary to ensure patient has properly functioning equipment and wearables to prevent infection, injury or death to patient.     DLES Assessment:  Appearance Of Driveline: 1  Antibiotics: NO  Velour: no  Manual & Visual Inspection Of Driveline: No kinks or tears noted  Stabilization Device In Use: yes, sun securement device      Patient MyChart Questionnaire:        Assessment:   PAIN: NO  Complaints Of Nausea / Vomiting: None noted    Appearance and Frequency Of Stools: normal and formed without blood & daily  Color Of Urine: clear/yellow  Coping/Depression/Anxiety: coping okay  Sleep Habits: 6-7 hrs /night  Sleep Aids: None noted  Showering: Yes, reminded to change dressing immediately after drying off  Activity/Exercise: yard work, walking   Driving: Yes. Reminded to pull over should there be an alarm before looking down at controller.    DLES Dressing Care:    Frequency of Dressing Changes: twice per week & daily kit  Pt In Need Of Management Kits?:no   It is medically necessary to have VAD management kits in order to prevent infection or to assist in the healing of an infected DLES.    Labs:    Chemistry        Component Value Date/Time     03/29/2022 1141    K 3.8 03/29/2022 1141     03/29/2022 1141    CO2 30 (H) 03/29/2022 1141    BUN 14 03/29/2022 1141    CREATININE 1.9 (H) 03/29/2022 1141    GLU 85 03/29/2022 1141        Component Value Date/Time    CALCIUM 9.9 03/29/2022 1141    ALKPHOS 131 03/29/2022 1141    AST 24 03/29/2022 1141    ALT 19 03/29/2022 1141    BILITOT 0.6 03/29/2022 1141    ESTGFRAFRICA 44.9 (A) 03/29/2022 1141    EGFRNONAA 38.8 (A) 03/29/2022 1141            Magnesium   Date Value Ref Range Status   03/17/2022 1.9 1.6 - 2.6 mg/dL Final       Lab Results   Component Value Date    WBC 4.23 03/17/2022    HGB 14.2 03/17/2022    HCT 46.8 03/17/2022    MCV 89 03/17/2022     03/17/2022       Lab Results   Component Value Date    INR 3.1 (H) 05/13/2022    INR 3.2 (H) 05/10/2022    INR 2.4 (H) 05/03/2022       BNP   Date Value Ref Range Status   03/29/2022 271 (H) 0 - 99 pg/mL Final     Comment:     Values of less than 100 pg/ml are consistent with non-CHF populations.   03/17/2022 140 (H) 0 - 99 pg/mL Final     Comment:     Values of less than 100 pg/ml are consistent with non-CHF populations.   01/19/2022 197 (H) 0 - 99 pg/mL Final     Comment:     Values of less than 100 pg/ml are consistent with non-CHF populations.       LD   Date Value Ref Range Status   03/17/2022 285 (H) 110 - 260 U/L Final     Comment:     Results are increased in hemolyzed samples.   01/19/2022 337 (H) 110 - 260 U/L Final     Comment:     Results are increased in hemolyzed samples.   11/11/2021 388 (H) 110 - 260 U/L Final     Comment:     Results are increased in hemolyzed samples.       Labs reviewed with patient: YES      Patient Satisfaction Survey  completed per patient: No  (explained about signature and box to check)  Medication reconciliation: per MA.  Medication Detail updated today: yes  Coumadin Managed by: [facility]    Education: Reviewed driveline care, emergency procedures, how to change the controller, alarms with patient, as well as discussed how to page the VAD coordinator in case of an emergency.   Covid - 19 education: Reminded patient/caregiver to check temperature daily and call us if it is > 99.0.  Reminded them  to stay 6 feet away from other people, wash hands frequently, don't touch your face and stay home except to get labs, medications, and appts.    Covid Vaccine: Pt informed that we are encouraging all VAD patients to receive the COVID vaccine.  Informed pt that they can take tylenol but should avoid other NSAIDs.      Plans/Needs: Routine f/u. D/C Coreg and started Bystolic due to symptoms with taking Coreg even once a day.  RTC 2 months with PFTs.     Hurricane Season: Yes, discussed with patient: With hurricane season approaching, we want to make sure you are fully prepared for any emergency.  Should the National Weather Service or your local authorities recommend a voluntary or mandatory evacuation of your area, The VAD team requires you to evacuate to a safe place.  Remember, when it is a mandatory evacuation, traffic will become an issue for your limited battery power.  Therefore, we strongly urge you to evacuate early.    The VAD team advises you to have the following in place before hurricane season:  Have an evacuation plan in place including places to evacuate, names and phone numbers.  This information is required to be given to the VAD coordinator.   1. Have your VAD emergency contact numbers with you.   2. Make sure your prescriptions will not run out by the end of September.   3. Make sure you have enough medications, including pills, inhalers, patches,   etc. to take, should you be gone for more than 2  weeks.   4. Make sure ALL of your batteries are fully charged.   5. Bring enough dressing change supplies to last for at least 2 weeks.   6. Bring your VAD binder with you.  Make sure your binder is updated and complete with alarms reference card, patient hand book, emergency contact numbers, daily log sheets, etc.  If you do not have family or friends as an evacuation destination, we recommend evacuating to a safe area.   Do NOT evacuate to [facility].    The VAD team wants to stress the importance of planning for your evacuation in the event of a hurricane.  If you have any LVAD questions or issues, please contact the LVAD coordinator.

## 2022-05-19 NOTE — LETTER
May 19, 2022        Nader Chiu  [address] [address]  Phone: [phone]  Fax: [phone]     Nader Chiu  [address]  [address]  [facility] [address]  Phone: [phone]  Fax: [phone]             [id number]  [address]  [facility] [address]  Phone: [phone]   Patient: Tim Richards   MR Number: [id number]   YOB: 1965   Date of Visit: 5/19/2022       Dear Dr. Nader Chiu, Nader Chiu    Thank you for referring Tim Richards to me for evaluation. Attached you will find relevant portions of my assessment and plan of care.    If you have questions, please do not hesitate to call me. I look forward to following Tim Richards along with you.    Sincerely,    Antonio Hadley Jr, MD    Enclosure    If you would like to receive this communication electronically, please contact [email or web address] or [phone] to request [employer] access.    [employer] is a tool which provides read-only access to select patient information with whom you have a relationship. Its easy to use and provides real time access to review your patients record including encounter summaries, notes, results, and demographic information.    If you feel you have received this communication in error or would no longer like to receive these types of communications, please e-mail [email or web address]

## 2022-05-19 NOTE — PROCEDURES
TXP SACHI INTERROGATIONS 5/19/2022 3/17/2022 1/19/2022 11/11/2021 9/16/2021 7/7/2021 5/5/2021   Type HeartMate II HeartMate II HeartMate II HeartMate II HeartMate II HeartMate II HeartMate II   Flow 6.0 4.2 5.6 7.2 4.4 4.1 4.5   Speed 9800 9800 9800 9800 9800 9800 9800   PI 3.3 3.4 2.6 2.3 3.0 2.7 3.1   Power (Jackson) 6.6 5.8 6.5 7.4 5.9 5.7 6.2   LSL 9400 9400 9400 9400 9400 8800 9400   Pulsatility No Pulse No Pulse No Pulse No Pulse No Pulse - No Pulse   }Interrogation of Ventricular assist device was performed with physician analysis of device parameters and review of device function. I have personally reviewed the interrogation findings and agree with findings as stated.

## 2022-05-19 NOTE — PATIENT INSTRUCTIONS
Change carvedilol to nebivolol for BP, heart rhythm and potential benefit for heart failure (approved in [place] for heart failure but not in the [place]

## 2022-05-20 ENCOUNTER — PATIENT MESSAGE (OUTPATIENT)
Dept: TRANSPLANT | Facility: CLINIC | Age: 57
End: 2022-05-20
Payer: COMMERCIAL

## 2022-05-26 ENCOUNTER — TELEPHONE (OUTPATIENT)
Dept: FAMILY MEDICINE | Facility: CLINIC | Age: 57
End: 2022-05-26
Payer: COMMERCIAL

## 2022-05-26 ENCOUNTER — ANTI-COAG VISIT (OUTPATIENT)
Dept: CARDIOLOGY | Facility: CLINIC | Age: 57
End: 2022-05-26
Payer: COMMERCIAL

## 2022-05-26 ENCOUNTER — LAB VISIT (OUTPATIENT)
Dept: LAB | Facility: HOSPITAL | Age: 57
End: 2022-05-26
Attending: INTERNAL MEDICINE
Payer: COMMERCIAL

## 2022-05-26 VITALS — SYSTOLIC BLOOD PRESSURE: 130 MMHG | DIASTOLIC BLOOD PRESSURE: 88 MMHG

## 2022-05-26 DIAGNOSIS — Z79.01 ANTICOAGULATION MONITORING, INR RANGE 2-3: ICD-10-CM

## 2022-05-26 DIAGNOSIS — Z95.811 LVAD (LEFT VENTRICULAR ASSIST DEVICE) PRESENT: ICD-10-CM

## 2022-05-26 DIAGNOSIS — Z95.811 LVAD (LEFT VENTRICULAR ASSIST DEVICE) PRESENT: Primary | ICD-10-CM

## 2022-05-26 LAB
INR PPP: 2.8 (ref 0.8–1.2)
PROTHROMBIN TIME: 28.4 SEC (ref 9–12.5)

## 2022-05-26 PROCEDURE — 93793 ANTICOAG MGMT PT WARFARIN: CPT | Mod: S$GLB,,,

## 2022-05-26 PROCEDURE — 36415 COLL VENOUS BLD VENIPUNCTURE: CPT | Performed by: INTERNAL MEDICINE

## 2022-05-26 PROCEDURE — 93793 PR ANTICOAGULANT MGMT FOR PT TAKING WARFARIN: ICD-10-PCS | Mod: S$GLB,,,

## 2022-05-26 PROCEDURE — 85610 PROTHROMBIN TIME: CPT | Performed by: INTERNAL MEDICINE

## 2022-05-30 ENCOUNTER — PATIENT MESSAGE (OUTPATIENT)
Dept: TRANSPLANT | Facility: CLINIC | Age: 57
End: 2022-05-30
Payer: COMMERCIAL

## 2022-05-30 ENCOUNTER — PATIENT MESSAGE (OUTPATIENT)
Dept: ADMINISTRATIVE | Facility: HOSPITAL | Age: 57
End: 2022-05-30
Payer: COMMERCIAL

## 2022-06-01 RX ORDER — CARVEDILOL 3.12 MG/1
3.12 TABLET ORAL DAILY
COMMUNITY
End: 2022-06-27

## 2022-06-02 ENCOUNTER — LAB VISIT (OUTPATIENT)
Dept: LAB | Facility: HOSPITAL | Age: 57
End: 2022-06-02
Attending: INTERNAL MEDICINE
Payer: COMMERCIAL

## 2022-06-02 ENCOUNTER — ANTI-COAG VISIT (OUTPATIENT)
Dept: CARDIOLOGY | Facility: CLINIC | Age: 57
End: 2022-06-02
Payer: COMMERCIAL

## 2022-06-02 DIAGNOSIS — Z95.811 LVAD (LEFT VENTRICULAR ASSIST DEVICE) PRESENT: Primary | ICD-10-CM

## 2022-06-02 DIAGNOSIS — Z79.01 ANTICOAGULATION MONITORING, INR RANGE 2-3: ICD-10-CM

## 2022-06-02 DIAGNOSIS — Z95.811 LVAD (LEFT VENTRICULAR ASSIST DEVICE) PRESENT: ICD-10-CM

## 2022-06-02 LAB
INR PPP: 2.7 (ref 0.8–1.2)
PROTHROMBIN TIME: 27.9 SEC (ref 9–12.5)

## 2022-06-02 PROCEDURE — 93793 PR ANTICOAGULANT MGMT FOR PT TAKING WARFARIN: ICD-10-PCS | Mod: S$GLB,,,

## 2022-06-02 PROCEDURE — 85610 PROTHROMBIN TIME: CPT | Performed by: INTERNAL MEDICINE

## 2022-06-02 PROCEDURE — 36415 COLL VENOUS BLD VENIPUNCTURE: CPT | Performed by: INTERNAL MEDICINE

## 2022-06-02 PROCEDURE — 93793 ANTICOAG MGMT PT WARFARIN: CPT | Mod: S$GLB,,,

## 2022-06-09 ENCOUNTER — ANTI-COAG VISIT (OUTPATIENT)
Dept: CARDIOLOGY | Facility: CLINIC | Age: 57
End: 2022-06-09
Payer: COMMERCIAL

## 2022-06-09 ENCOUNTER — LAB VISIT (OUTPATIENT)
Dept: LAB | Facility: HOSPITAL | Age: 57
End: 2022-06-09
Attending: INTERNAL MEDICINE
Payer: COMMERCIAL

## 2022-06-09 DIAGNOSIS — Z79.01 ANTICOAGULATION MONITORING, INR RANGE 2-3: ICD-10-CM

## 2022-06-09 DIAGNOSIS — Z95.811 LVAD (LEFT VENTRICULAR ASSIST DEVICE) PRESENT: ICD-10-CM

## 2022-06-09 DIAGNOSIS — Z95.811 LVAD (LEFT VENTRICULAR ASSIST DEVICE) PRESENT: Primary | ICD-10-CM

## 2022-06-09 LAB
INR PPP: 2.7 (ref 0.8–1.2)
PROTHROMBIN TIME: 27.2 SEC (ref 9–12.5)

## 2022-06-09 PROCEDURE — 85610 PROTHROMBIN TIME: CPT | Performed by: INTERNAL MEDICINE

## 2022-06-09 PROCEDURE — 93793 ANTICOAG MGMT PT WARFARIN: CPT | Mod: S$GLB,,,

## 2022-06-09 PROCEDURE — 93793 PR ANTICOAGULANT MGMT FOR PT TAKING WARFARIN: ICD-10-PCS | Mod: S$GLB,,,

## 2022-06-09 PROCEDURE — 36415 COLL VENOUS BLD VENIPUNCTURE: CPT | Performed by: INTERNAL MEDICINE

## 2022-06-12 ENCOUNTER — CLINICAL SUPPORT (OUTPATIENT)
Dept: CARDIOLOGY | Facility: HOSPITAL | Age: 57
End: 2022-06-12
Payer: COMMERCIAL

## 2022-06-12 DIAGNOSIS — I47.20 VENTRICULAR TACHYCARDIA: ICD-10-CM

## 2022-06-12 DIAGNOSIS — I50.42 CHRONIC COMBINED SYSTOLIC (CONGESTIVE) AND DIASTOLIC (CONGESTIVE) HEART FAILURE: ICD-10-CM

## 2022-06-12 DIAGNOSIS — Z95.810 PRESENCE OF AUTOMATIC (IMPLANTABLE) CARDIAC DEFIBRILLATOR: ICD-10-CM

## 2022-06-12 DIAGNOSIS — I42.9 CARDIOMYOPATHY, UNSPECIFIED: ICD-10-CM

## 2022-06-12 PROCEDURE — 93296 REM INTERROG EVL PM/IDS: CPT | Performed by: INTERNAL MEDICINE

## 2022-06-12 PROCEDURE — 93295 DEV INTERROG REMOTE 1/2/MLT: CPT | Mod: ,,, | Performed by: INTERNAL MEDICINE

## 2022-06-12 PROCEDURE — 93295 CARDIAC DEVICE CHECK - REMOTE: ICD-10-PCS | Mod: ,,, | Performed by: INTERNAL MEDICINE

## 2022-06-13 ENCOUNTER — PATIENT MESSAGE (OUTPATIENT)
Dept: PSYCHIATRY | Facility: CLINIC | Age: 57
End: 2022-06-13
Payer: MEDICARE

## 2022-06-13 ENCOUNTER — TELEPHONE (OUTPATIENT)
Dept: CARDIOLOGY | Facility: HOSPITAL | Age: 57
End: 2022-06-13
Payer: MEDICARE

## 2022-06-13 NOTE — TELEPHONE ENCOUNTER
Following Provider: Dr Jama Weaver remote device transmission received; alert for HV therapy from subQ AICD  Device Type: SubQ ICD  Date/Time:  6/13/22 @0647  Event Type:  PMVT  Ventricular CL: approx 220 ms  [place]:  18 secs  Therapy Delivered:  Shock  Appropriate Therapy:  Yes  Successful:  Yes  Pt Symptomatic: Pt was sleeping at the time of shock which woke him up.  He states he felt jittery right after but now just tired but feels okay     NICM, LVAD  EF 10% on 5/19/22  Hx of VT and VF, pt has had mult shocks from the device     Amiodarone 200mg daily, has amiodarone induced hypothyroidism and sees Dr. Piedra.  Unsure when dose changed from 400 mg daily to 200 mg.  Pt states he has been taking 200 mg for a while now.   Mexitil 250mg TID    Device check with reprogramming of Smart charge  from 0.91 seconds --> 4.57 seconds on 3/29/22 by Sherwin Figueroa Representative     UPDATE:  Amiodarone increased  to 400 mg QD.  Called and instructed patient to increase his Amiodarone to 400 mg QD.  Pt stated understanding.

## 2022-06-14 ENCOUNTER — PATIENT MESSAGE (OUTPATIENT)
Dept: TRANSPLANT | Facility: CLINIC | Age: 57
End: 2022-06-14
Payer: MEDICARE

## 2022-06-14 RX ORDER — AMIODARONE HYDROCHLORIDE 200 MG/1
400 TABLET ORAL DAILY
Qty: 180 TABLET | Refills: 3 | Status: SHIPPED | OUTPATIENT
Start: 2022-06-14 | End: 2022-09-22 | Stop reason: SDUPTHER

## 2022-06-15 ENCOUNTER — PATIENT MESSAGE (OUTPATIENT)
Dept: PSYCHIATRY | Facility: CLINIC | Age: 57
End: 2022-06-15
Payer: MEDICARE

## 2022-06-16 ENCOUNTER — ANTI-COAG VISIT (OUTPATIENT)
Dept: CARDIOLOGY | Facility: CLINIC | Age: 57
End: 2022-06-16
Payer: COMMERCIAL

## 2022-06-16 ENCOUNTER — LAB VISIT (OUTPATIENT)
Dept: LAB | Facility: HOSPITAL | Age: 57
End: 2022-06-16
Attending: INTERNAL MEDICINE
Payer: COMMERCIAL

## 2022-06-16 DIAGNOSIS — Z95.811 LVAD (LEFT VENTRICULAR ASSIST DEVICE) PRESENT: ICD-10-CM

## 2022-06-16 DIAGNOSIS — Z79.01 ANTICOAGULATION MONITORING, INR RANGE 2-3: ICD-10-CM

## 2022-06-16 DIAGNOSIS — Z95.811 LVAD (LEFT VENTRICULAR ASSIST DEVICE) PRESENT: Primary | ICD-10-CM

## 2022-06-16 LAB
INR PPP: 3.4 (ref 0.8–1.2)
PROTHROMBIN TIME: 34 SEC (ref 9–12.5)

## 2022-06-16 PROCEDURE — 36415 COLL VENOUS BLD VENIPUNCTURE: CPT | Performed by: INTERNAL MEDICINE

## 2022-06-16 PROCEDURE — 93793 PR ANTICOAGULANT MGMT FOR PT TAKING WARFARIN: ICD-10-PCS | Mod: S$GLB,,,

## 2022-06-16 PROCEDURE — 93793 ANTICOAG MGMT PT WARFARIN: CPT | Mod: S$GLB,,,

## 2022-06-16 PROCEDURE — 85610 PROTHROMBIN TIME: CPT | Performed by: INTERNAL MEDICINE

## 2022-06-16 RX ORDER — BUSPIRONE HYDROCHLORIDE 5 MG/1
5 TABLET ORAL 3 TIMES DAILY
Qty: 90 TABLET | Refills: 1 | Status: ON HOLD | OUTPATIENT
Start: 2022-06-16 | End: 2022-09-01 | Stop reason: HOSPADM

## 2022-06-16 RX ORDER — ALPRAZOLAM 1 MG/1
1 TABLET ORAL DAILY PRN
Qty: 30 TABLET | Refills: 1 | Status: ON HOLD | OUTPATIENT
Start: 2022-06-16 | End: 2022-07-21

## 2022-06-16 NOTE — PROGRESS NOTES
Patient wife ricardo advised of dose instructions and verbalized understanding.  Patient rescheduled appointment from 6/20 to 6/23.

## 2022-06-23 ENCOUNTER — ANTI-COAG VISIT (OUTPATIENT)
Dept: CARDIOLOGY | Facility: CLINIC | Age: 57
End: 2022-06-23
Payer: COMMERCIAL

## 2022-06-23 ENCOUNTER — LAB VISIT (OUTPATIENT)
Dept: LAB | Facility: HOSPITAL | Age: 57
End: 2022-06-23
Attending: INTERNAL MEDICINE
Payer: MEDICARE

## 2022-06-23 DIAGNOSIS — Z95.811 LVAD (LEFT VENTRICULAR ASSIST DEVICE) PRESENT: ICD-10-CM

## 2022-06-23 DIAGNOSIS — Z79.01 ANTICOAGULATION MONITORING, INR RANGE 2-3: ICD-10-CM

## 2022-06-23 DIAGNOSIS — Z95.811 LVAD (LEFT VENTRICULAR ASSIST DEVICE) PRESENT: Primary | ICD-10-CM

## 2022-06-23 LAB
INR PPP: 2.9 (ref 0.8–1.2)
PROTHROMBIN TIME: 29.2 SEC (ref 9–12.5)

## 2022-06-23 PROCEDURE — 36415 COLL VENOUS BLD VENIPUNCTURE: CPT | Performed by: INTERNAL MEDICINE

## 2022-06-23 PROCEDURE — 93793 PR ANTICOAGULANT MGMT FOR PT TAKING WARFARIN: ICD-10-PCS | Mod: S$GLB,,,

## 2022-06-23 PROCEDURE — 85610 PROTHROMBIN TIME: CPT | Performed by: INTERNAL MEDICINE

## 2022-06-23 PROCEDURE — 93793 ANTICOAG MGMT PT WARFARIN: CPT | Mod: S$GLB,,,

## 2022-06-27 ENCOUNTER — HOSPITAL ENCOUNTER (INPATIENT)
Facility: HOSPITAL | Age: 57
LOS: 66 days | Discharge: NURSING, REHAB OR LONG-TERM CARE | DRG: 001 | End: 2022-09-01
Attending: EMERGENCY MEDICINE | Admitting: INTERNAL MEDICINE
Payer: COMMERCIAL

## 2022-06-27 DIAGNOSIS — T50.2X5A DIURETIC-INDUCED HYPOKALEMIA: ICD-10-CM

## 2022-06-27 DIAGNOSIS — D72.829 LEUKOCYTOSIS, UNSPECIFIED TYPE: ICD-10-CM

## 2022-06-27 DIAGNOSIS — A41.9 SEPSIS, DUE TO UNSPECIFIED ORGANISM, UNSPECIFIED WHETHER ACUTE ORGAN DYSFUNCTION PRESENT: ICD-10-CM

## 2022-06-27 DIAGNOSIS — T82.9XXA LEFT VENTRICULAR ASSIST DEVICE (LVAD) COMPLICATION: ICD-10-CM

## 2022-06-27 DIAGNOSIS — Z51.5 PALLIATIVE CARE ENCOUNTER: ICD-10-CM

## 2022-06-27 DIAGNOSIS — T46.2X5A AMIODARONE-INDUCED HYPERTHYROIDISM: ICD-10-CM

## 2022-06-27 DIAGNOSIS — Z95.811 LVAD (LEFT VENTRICULAR ASSIST DEVICE) PRESENT: ICD-10-CM

## 2022-06-27 DIAGNOSIS — U07.1 COVID-19: ICD-10-CM

## 2022-06-27 DIAGNOSIS — I10 PRIMARY HYPERTENSION: Chronic | ICD-10-CM

## 2022-06-27 DIAGNOSIS — E05.80 AMIODARONE-INDUCED HYPERTHYROIDISM: ICD-10-CM

## 2022-06-27 DIAGNOSIS — I48.92 ATRIAL FLUTTER, UNSPECIFIED TYPE: ICD-10-CM

## 2022-06-27 DIAGNOSIS — E44.0 MODERATE PROTEIN-CALORIE MALNUTRITION: ICD-10-CM

## 2022-06-27 DIAGNOSIS — R74.02 ELEVATED SERUM LACTATE DEHYDROGENASE (LDH): ICD-10-CM

## 2022-06-27 DIAGNOSIS — I50.9 HEART FAILURE: ICD-10-CM

## 2022-06-27 DIAGNOSIS — R79.89 ELEVATED BRAIN NATRIURETIC PEPTIDE (BNP) LEVEL: ICD-10-CM

## 2022-06-27 DIAGNOSIS — T46.2X1A HYPOTHYROIDISM DUE TO AMIODARONE: ICD-10-CM

## 2022-06-27 DIAGNOSIS — I50.43 ACUTE ON CHRONIC COMBINED SYSTOLIC AND DIASTOLIC HEART FAILURE: ICD-10-CM

## 2022-06-27 DIAGNOSIS — I10 HYPERTENSION, UNSPECIFIED TYPE: ICD-10-CM

## 2022-06-27 DIAGNOSIS — F32.A DEPRESSION: ICD-10-CM

## 2022-06-27 DIAGNOSIS — I50.42 CHRONIC COMBINED SYSTOLIC AND DIASTOLIC HEART FAILURE: Chronic | ICD-10-CM

## 2022-06-27 DIAGNOSIS — E87.3 METABOLIC ALKALOSIS: ICD-10-CM

## 2022-06-27 DIAGNOSIS — I47.20 SUSTAINED VENTRICULAR TACHYCARDIA: ICD-10-CM

## 2022-06-27 DIAGNOSIS — E66.09 CLASS 1 OBESITY DUE TO EXCESS CALORIES WITH SERIOUS COMORBIDITY AND BODY MASS INDEX (BMI) OF 32.0 TO 32.9 IN ADULT: ICD-10-CM

## 2022-06-27 DIAGNOSIS — I50.43 ACUTE ON CHRONIC COMBINED SYSTOLIC AND DIASTOLIC CONGESTIVE HEART FAILURE: ICD-10-CM

## 2022-06-27 DIAGNOSIS — Z79.01 ANTICOAGULATION MONITORING, INR RANGE 2-3: ICD-10-CM

## 2022-06-27 DIAGNOSIS — E87.6 DIURETIC-INDUCED HYPOKALEMIA: ICD-10-CM

## 2022-06-27 DIAGNOSIS — R57.9 SHOCK: ICD-10-CM

## 2022-06-27 DIAGNOSIS — Z87.11 HISTORY OF BLEEDING ULCERS: ICD-10-CM

## 2022-06-27 DIAGNOSIS — R06.02 SOB (SHORTNESS OF BREATH): ICD-10-CM

## 2022-06-27 DIAGNOSIS — D62 ACUTE BLOOD LOSS ANEMIA: ICD-10-CM

## 2022-06-27 DIAGNOSIS — G47.33 OSA (OBSTRUCTIVE SLEEP APNEA): ICD-10-CM

## 2022-06-27 DIAGNOSIS — R19.7 DIARRHEA, UNSPECIFIED TYPE: ICD-10-CM

## 2022-06-27 DIAGNOSIS — I50.1 PULMONARY EDEMA CARDIAC CAUSE: ICD-10-CM

## 2022-06-27 DIAGNOSIS — Z71.89 ADVANCE CARE PLANNING: ICD-10-CM

## 2022-06-27 DIAGNOSIS — U07.1 COVID-19 VIRUS INFECTION: ICD-10-CM

## 2022-06-27 DIAGNOSIS — Z86.79 HISTORY OF VENTRICULAR TACHYCARDIA: ICD-10-CM

## 2022-06-27 DIAGNOSIS — Z79.899 HIGH RISK MEDICATIONS (NOT ANTICOAGULANTS) LONG-TERM USE: ICD-10-CM

## 2022-06-27 DIAGNOSIS — E87.0 HYPERNATREMIA: ICD-10-CM

## 2022-06-27 DIAGNOSIS — N17.9 AKI (ACUTE KIDNEY INJURY): ICD-10-CM

## 2022-06-27 DIAGNOSIS — Z74.09 IMPAIRED MOBILITY: ICD-10-CM

## 2022-06-27 DIAGNOSIS — T82.9XXD COMPLICATION INVOLVING LEFT VENTRICULAR ASSIST DEVICE (LVAD), SUBSEQUENT ENCOUNTER: ICD-10-CM

## 2022-06-27 DIAGNOSIS — E87.5 HYPERKALEMIA: Primary | ICD-10-CM

## 2022-06-27 DIAGNOSIS — I13.0 HYPERTENSIVE CARDIOVASCULAR-RENAL DISEASE, STAGE 1-4 OR UNSPECIFIED CHRONIC KIDNEY DISEASE, WITH HEART FAILURE: ICD-10-CM

## 2022-06-27 DIAGNOSIS — I50.9 CHF (CONGESTIVE HEART FAILURE): ICD-10-CM

## 2022-06-27 DIAGNOSIS — R06.02 SHORTNESS OF BREATH: ICD-10-CM

## 2022-06-27 DIAGNOSIS — I49.01 VF (VENTRICULAR FIBRILLATION): ICD-10-CM

## 2022-06-27 DIAGNOSIS — R00.0 TACHYCARDIA: ICD-10-CM

## 2022-06-27 DIAGNOSIS — I50.9 ACUTE DECOMPENSATED HEART FAILURE: ICD-10-CM

## 2022-06-27 DIAGNOSIS — I10 ESSENTIAL HYPERTENSION: ICD-10-CM

## 2022-06-27 DIAGNOSIS — I42.0 DILATED CARDIOMYOPATHY: ICD-10-CM

## 2022-06-27 DIAGNOSIS — R07.9 CHEST PAIN: ICD-10-CM

## 2022-06-27 DIAGNOSIS — I48.91 A-FIB: ICD-10-CM

## 2022-06-27 DIAGNOSIS — E03.2 HYPOTHYROIDISM DUE TO AMIODARONE: ICD-10-CM

## 2022-06-27 DIAGNOSIS — J96.01 ACUTE HYPOXEMIC RESPIRATORY FAILURE: ICD-10-CM

## 2022-06-27 DIAGNOSIS — J81.0 ACUTE PULMONARY EDEMA: ICD-10-CM

## 2022-06-27 DIAGNOSIS — I50.23 ACUTE ON CHRONIC SYSTOLIC CONGESTIVE HEART FAILURE: ICD-10-CM

## 2022-06-27 DIAGNOSIS — N18.32 STAGE 3B CHRONIC KIDNEY DISEASE: Chronic | ICD-10-CM

## 2022-06-27 DIAGNOSIS — Z99.11 ENCOUNTER FOR WEANING FROM VENTILATOR: ICD-10-CM

## 2022-06-27 DIAGNOSIS — I49.8 SINUS ARRHYTHMIA: ICD-10-CM

## 2022-06-27 DIAGNOSIS — Z92.81 PERSONAL HISTORY OF EXTRACORPOREAL MEMBRANE OXYGENATION (ECMO): ICD-10-CM

## 2022-06-27 DIAGNOSIS — Z01.818 PRE-TRANSPLANT EVALUATION FOR HEART TRANSPLANT: ICD-10-CM

## 2022-06-27 DIAGNOSIS — I48.91 ATRIAL FIBRILLATION, UNSPECIFIED TYPE: ICD-10-CM

## 2022-06-27 DIAGNOSIS — J96.02 ACUTE HYPERCAPNIC RESPIRATORY FAILURE: ICD-10-CM

## 2022-06-27 DIAGNOSIS — Z95.811 HISTORY OF LEFT VENTRICULAR ASSIST DEVICE (LVAD): ICD-10-CM

## 2022-06-27 DIAGNOSIS — I47.20 VT (VENTRICULAR TACHYCARDIA): ICD-10-CM

## 2022-06-27 DIAGNOSIS — Z95.811 HEART REPLACED BY HEART ASSIST DEVICE: ICD-10-CM

## 2022-06-27 DIAGNOSIS — R09.89 JVD (JUGULAR VENOUS DISTENSION): ICD-10-CM

## 2022-06-27 DIAGNOSIS — D59.8 OTHER ACQUIRED HEMOLYTIC ANEMIAS: ICD-10-CM

## 2022-06-27 LAB
ALBUMIN SERPL BCP-MCNC: 3.6 G/DL (ref 3.5–5.2)
ALBUMIN SERPL BCP-MCNC: 3.7 G/DL (ref 3.5–5.2)
ALLENS TEST: ABNORMAL
ALP SERPL-CCNC: 113 U/L (ref 55–135)
ALP SERPL-CCNC: 118 U/L (ref 55–135)
ALT SERPL W/O P-5'-P-CCNC: 67 U/L (ref 10–44)
ALT SERPL W/O P-5'-P-CCNC: 73 U/L (ref 10–44)
ANION GAP SERPL CALC-SCNC: 15 MMOL/L (ref 8–16)
ANION GAP SERPL CALC-SCNC: 8 MMOL/L (ref 8–16)
AST SERPL-CCNC: 110 U/L (ref 10–40)
AST SERPL-CCNC: 99 U/L (ref 10–40)
BASOPHILS # BLD AUTO: 0.03 K/UL (ref 0–0.2)
BASOPHILS NFR BLD: 0.3 % (ref 0–1.9)
BILIRUB SERPL-MCNC: 2 MG/DL (ref 0.1–1)
BILIRUB SERPL-MCNC: 2.1 MG/DL (ref 0.1–1)
BNP SERPL-MCNC: 1951 PG/ML (ref 0–99)
BUN SERPL-MCNC: 32 MG/DL (ref 6–20)
BUN SERPL-MCNC: 33 MG/DL (ref 6–30)
BUN SERPL-MCNC: 34 MG/DL (ref 6–20)
CALCIUM SERPL-MCNC: 10.1 MG/DL (ref 8.7–10.5)
CALCIUM SERPL-MCNC: 10.1 MG/DL (ref 8.7–10.5)
CHLORIDE SERPL-SCNC: 100 MMOL/L (ref 95–110)
CHLORIDE SERPL-SCNC: 100 MMOL/L (ref 95–110)
CHLORIDE SERPL-SCNC: 101 MMOL/L (ref 95–110)
CK SERPL-CCNC: 121 U/L (ref 20–200)
CO2 SERPL-SCNC: 17 MMOL/L (ref 23–29)
CO2 SERPL-SCNC: 22 MMOL/L (ref 23–29)
CREAT SERPL-MCNC: 2.5 MG/DL (ref 0.5–1.4)
CREAT SERPL-MCNC: 2.6 MG/DL (ref 0.5–1.4)
CREAT SERPL-MCNC: 2.8 MG/DL (ref 0.5–1.4)
CREAT UR-MCNC: 235 MG/DL (ref 23–375)
CRP SERPL-MCNC: 91.8 MG/L (ref 0–8.2)
CTP QC/QA: YES
DIFFERENTIAL METHOD: ABNORMAL
EOSINOPHIL # BLD AUTO: 0 K/UL (ref 0–0.5)
EOSINOPHIL NFR BLD: 0 % (ref 0–8)
ERYTHROCYTE [DISTWIDTH] IN BLOOD BY AUTOMATED COUNT: 13.8 % (ref 11.5–14.5)
EST. GFR  (AFRICAN AMERICAN): 28.1 ML/MIN/1.73 M^2
EST. GFR  (AFRICAN AMERICAN): 32.2 ML/MIN/1.73 M^2
EST. GFR  (NON AFRICAN AMERICAN): 24.3 ML/MIN/1.73 M^2
EST. GFR  (NON AFRICAN AMERICAN): 27.9 ML/MIN/1.73 M^2
GLUCOSE SERPL-MCNC: 105 MG/DL (ref 70–110)
GLUCOSE SERPL-MCNC: 115 MG/DL (ref 70–110)
GLUCOSE SERPL-MCNC: 124 MG/DL (ref 70–110)
HAPTOGLOB SERPL-MCNC: <10 MG/DL (ref 30–250)
HCO3 UR-SCNC: 23.1 MMOL/L (ref 24–28)
HCT VFR BLD AUTO: 40.6 % (ref 40–54)
HCT VFR BLD CALC: 41 %PCV (ref 36–54)
HCV AB SERPL QL IA: NEGATIVE
HGB BLD-MCNC: 12.6 G/DL (ref 14–18)
HIV 1+2 AB+HIV1 P24 AG SERPL QL IA: NEGATIVE
IMM GRANULOCYTES # BLD AUTO: 0.02 K/UL (ref 0–0.04)
IMM GRANULOCYTES NFR BLD AUTO: 0.2 % (ref 0–0.5)
INR PPP: 3.2 (ref 0.8–1.2)
LDH SERPL L TO P-CCNC: 1058 U/L (ref 110–260)
LDH SERPL L TO P-CCNC: 1133 U/L (ref 110–260)
LYMPHOCYTES # BLD AUTO: 0.9 K/UL (ref 1–4.8)
LYMPHOCYTES NFR BLD: 8.7 % (ref 18–48)
MAGNESIUM SERPL-MCNC: 2.2 MG/DL (ref 1.6–2.6)
MCH RBC QN AUTO: 26.6 PG (ref 27–31)
MCHC RBC AUTO-ENTMCNC: 31 G/DL (ref 32–36)
MCV RBC AUTO: 86 FL (ref 82–98)
MONOCYTES # BLD AUTO: 1.3 K/UL (ref 0.3–1)
MONOCYTES NFR BLD: 12.6 % (ref 4–15)
NEUTROPHILS # BLD AUTO: 7.9 K/UL (ref 1.8–7.7)
NEUTROPHILS NFR BLD: 78.2 % (ref 38–73)
NRBC BLD-RTO: 0 /100 WBC
OSMOLALITY UR: 376 MOSM/KG (ref 50–1200)
PCO2 BLDA: 38 MMHG (ref 35–45)
PH SMN: 7.39 [PH] (ref 7.35–7.45)
PHOSPHATE SERPL-MCNC: 3.4 MG/DL (ref 2.7–4.5)
PLATELET # BLD AUTO: 271 K/UL (ref 150–450)
PMV BLD AUTO: 11 FL (ref 9.2–12.9)
PO2 BLDA: 32 MMHG (ref 40–60)
POC BE: -2 MMOL/L
POC IONIZED CALCIUM: 1.17 MMOL/L (ref 1.06–1.42)
POC SATURATED O2: 62 % (ref 95–100)
POC TCO2 (MEASURED): 21 MMOL/L (ref 23–29)
POC TCO2: 24 MMOL/L (ref 24–29)
POTASSIUM BLD-SCNC: 5.3 MMOL/L (ref 3.5–5.1)
POTASSIUM SERPL-SCNC: 5.1 MMOL/L (ref 3.5–5.1)
POTASSIUM SERPL-SCNC: 5.4 MMOL/L (ref 3.5–5.1)
POTASSIUM SERPL-SCNC: 5.7 MMOL/L (ref 3.5–5.1)
PROT SERPL-MCNC: 8.3 G/DL (ref 6–8.4)
PROT SERPL-MCNC: 8.5 G/DL (ref 6–8.4)
PROTHROMBIN TIME: 30.8 SEC (ref 9–12.5)
RBC # BLD AUTO: 4.73 M/UL (ref 4.6–6.2)
SAMPLE: ABNORMAL
SAMPLE: ABNORMAL
SARS-COV-2 RDRP RESP QL NAA+PROBE: POSITIVE
SITE: ABNORMAL
SODIUM BLD-SCNC: 132 MMOL/L (ref 136–145)
SODIUM SERPL-SCNC: 130 MMOL/L (ref 136–145)
SODIUM SERPL-SCNC: 132 MMOL/L (ref 136–145)
SODIUM UR-SCNC: <10 MMOL/L (ref 20–250)
TROPONIN I SERPL DL<=0.01 NG/ML-MCNC: 0.08 NG/ML (ref 0–0.03)
TROPONIN I SERPL DL<=0.01 NG/ML-MCNC: 0.1 NG/ML (ref 0–0.03)
UUN UR-MCNC: 458 MG/DL (ref 140–1050)
WBC # BLD AUTO: 10.06 K/UL (ref 3.9–12.7)

## 2022-06-27 PROCEDURE — 94660 CPAP INITIATION&MGMT: CPT

## 2022-06-27 PROCEDURE — 93010 ELECTROCARDIOGRAM REPORT: CPT | Mod: ,,, | Performed by: INTERNAL MEDICINE

## 2022-06-27 PROCEDURE — 99285 PR EMERGENCY DEPT VISIT,LEVEL V: ICD-10-PCS | Mod: CR,CS,, | Performed by: EMERGENCY MEDICINE

## 2022-06-27 PROCEDURE — 80053 COMPREHEN METABOLIC PANEL: CPT | Mod: 91 | Performed by: STUDENT IN AN ORGANIZED HEALTH CARE EDUCATION/TRAINING PROGRAM

## 2022-06-27 PROCEDURE — 99223 PR INITIAL HOSPITAL CARE,LEVL III: ICD-10-PCS | Mod: AI,,, | Performed by: INTERNAL MEDICINE

## 2022-06-27 PROCEDURE — 83010 ASSAY OF HAPTOGLOBIN QUANT: CPT | Performed by: INTERNAL MEDICINE

## 2022-06-27 PROCEDURE — 27000190 HC CPAP FULL FACE MASK W/VALVE

## 2022-06-27 PROCEDURE — 93750 INTERROGATION VAD IN PERSON: CPT | Mod: ,,, | Performed by: INTERNAL MEDICINE

## 2022-06-27 PROCEDURE — U0002 COVID-19 LAB TEST NON-CDC: HCPCS | Performed by: EMERGENCY MEDICINE

## 2022-06-27 PROCEDURE — 96365 THER/PROPH/DIAG IV INF INIT: CPT

## 2022-06-27 PROCEDURE — 84484 ASSAY OF TROPONIN QUANT: CPT | Mod: 91 | Performed by: EMERGENCY MEDICINE

## 2022-06-27 PROCEDURE — 83615 LACTATE (LD) (LDH) ENZYME: CPT | Mod: 91 | Performed by: EMERGENCY MEDICINE

## 2022-06-27 PROCEDURE — 25000003 PHARM REV CODE 250: Performed by: INTERNAL MEDICINE

## 2022-06-27 PROCEDURE — 85025 COMPLETE CBC W/AUTO DIFF WBC: CPT | Performed by: EMERGENCY MEDICINE

## 2022-06-27 PROCEDURE — 86803 HEPATITIS C AB TEST: CPT | Performed by: EMERGENCY MEDICINE

## 2022-06-27 PROCEDURE — 87389 HIV-1 AG W/HIV-1&-2 AB AG IA: CPT | Performed by: EMERGENCY MEDICINE

## 2022-06-27 PROCEDURE — 96375 TX/PRO/DX INJ NEW DRUG ADDON: CPT

## 2022-06-27 PROCEDURE — 83880 ASSAY OF NATRIURETIC PEPTIDE: CPT | Performed by: EMERGENCY MEDICINE

## 2022-06-27 PROCEDURE — 85610 PROTHROMBIN TIME: CPT | Performed by: EMERGENCY MEDICINE

## 2022-06-27 PROCEDURE — 36415 COLL VENOUS BLD VENIPUNCTURE: CPT | Performed by: STUDENT IN AN ORGANIZED HEALTH CARE EDUCATION/TRAINING PROGRAM

## 2022-06-27 PROCEDURE — 27000221 HC OXYGEN, UP TO 24 HOURS

## 2022-06-27 PROCEDURE — 63600175 PHARM REV CODE 636 W HCPCS: Mod: TB | Performed by: STUDENT IN AN ORGANIZED HEALTH CARE EDUCATION/TRAINING PROGRAM

## 2022-06-27 PROCEDURE — 27000248 HC VAD-ADDITIONAL DAY

## 2022-06-27 PROCEDURE — 93005 ELECTROCARDIOGRAM TRACING: CPT

## 2022-06-27 PROCEDURE — 84100 ASSAY OF PHOSPHORUS: CPT | Performed by: STUDENT IN AN ORGANIZED HEALTH CARE EDUCATION/TRAINING PROGRAM

## 2022-06-27 PROCEDURE — 93010 EKG 12-LEAD: ICD-10-PCS | Mod: ,,, | Performed by: INTERNAL MEDICINE

## 2022-06-27 PROCEDURE — 99285 EMERGENCY DEPT VISIT HI MDM: CPT | Mod: 25

## 2022-06-27 PROCEDURE — 82550 ASSAY OF CK (CPK): CPT | Performed by: EMERGENCY MEDICINE

## 2022-06-27 PROCEDURE — 83735 ASSAY OF MAGNESIUM: CPT | Performed by: STUDENT IN AN ORGANIZED HEALTH CARE EDUCATION/TRAINING PROGRAM

## 2022-06-27 PROCEDURE — 94761 N-INVAS EAR/PLS OXIMETRY MLT: CPT

## 2022-06-27 PROCEDURE — 99223 1ST HOSP IP/OBS HIGH 75: CPT | Mod: AI,,, | Performed by: INTERNAL MEDICINE

## 2022-06-27 PROCEDURE — 80053 COMPREHEN METABOLIC PANEL: CPT | Performed by: EMERGENCY MEDICINE

## 2022-06-27 PROCEDURE — 99285 EMERGENCY DEPT VISIT HI MDM: CPT | Mod: CR,CS,, | Performed by: EMERGENCY MEDICINE

## 2022-06-27 PROCEDURE — 93750 PR INTERROGATE VENT ASSIST DEV, IN PERSON, W PHYSICIAN ANALYSIS: ICD-10-PCS | Mod: ,,, | Performed by: INTERNAL MEDICINE

## 2022-06-27 PROCEDURE — 84300 ASSAY OF URINE SODIUM: CPT | Performed by: EMERGENCY MEDICINE

## 2022-06-27 PROCEDURE — 99900035 HC TECH TIME PER 15 MIN (STAT)

## 2022-06-27 PROCEDURE — 63600175 PHARM REV CODE 636 W HCPCS: Performed by: STUDENT IN AN ORGANIZED HEALTH CARE EDUCATION/TRAINING PROGRAM

## 2022-06-27 PROCEDURE — 63600175 PHARM REV CODE 636 W HCPCS: Performed by: EMERGENCY MEDICINE

## 2022-06-27 PROCEDURE — 83051 HEMOGLOBIN PLASMA: CPT | Performed by: STUDENT IN AN ORGANIZED HEALTH CARE EDUCATION/TRAINING PROGRAM

## 2022-06-27 PROCEDURE — 82803 BLOOD GASES ANY COMBINATION: CPT

## 2022-06-27 PROCEDURE — 84132 ASSAY OF SERUM POTASSIUM: CPT | Performed by: STUDENT IN AN ORGANIZED HEALTH CARE EDUCATION/TRAINING PROGRAM

## 2022-06-27 PROCEDURE — 96366 THER/PROPH/DIAG IV INF ADDON: CPT

## 2022-06-27 PROCEDURE — 86140 C-REACTIVE PROTEIN: CPT | Performed by: EMERGENCY MEDICINE

## 2022-06-27 PROCEDURE — 25000003 PHARM REV CODE 250: Performed by: EMERGENCY MEDICINE

## 2022-06-27 PROCEDURE — 83935 ASSAY OF URINE OSMOLALITY: CPT | Performed by: EMERGENCY MEDICINE

## 2022-06-27 PROCEDURE — 83615 LACTATE (LD) (LDH) ENZYME: CPT | Performed by: INTERNAL MEDICINE

## 2022-06-27 PROCEDURE — 20600001 HC STEP DOWN PRIVATE ROOM

## 2022-06-27 PROCEDURE — 84540 ASSAY OF URINE/UREA-N: CPT | Performed by: EMERGENCY MEDICINE

## 2022-06-27 PROCEDURE — 82570 ASSAY OF URINE CREATININE: CPT | Performed by: EMERGENCY MEDICINE

## 2022-06-27 PROCEDURE — 25000003 PHARM REV CODE 250: Performed by: STUDENT IN AN ORGANIZED HEALTH CARE EDUCATION/TRAINING PROGRAM

## 2022-06-27 RX ORDER — AMLODIPINE BESYLATE 10 MG/1
10 TABLET ORAL
Status: COMPLETED | OUTPATIENT
Start: 2022-06-27 | End: 2022-06-27

## 2022-06-27 RX ORDER — PANTOPRAZOLE SODIUM 40 MG/1
40 TABLET, DELAYED RELEASE ORAL
Status: DISCONTINUED | OUTPATIENT
Start: 2022-06-28 | End: 2022-07-13

## 2022-06-27 RX ORDER — NITROGLYCERIN 20 MG/100ML
5 INJECTION INTRAVENOUS CONTINUOUS
Status: DISCONTINUED | OUTPATIENT
Start: 2022-06-27 | End: 2022-06-27

## 2022-06-27 RX ORDER — ACETAMINOPHEN 325 MG/1
650 TABLET ORAL EVERY 6 HOURS PRN
Status: DISCONTINUED | OUTPATIENT
Start: 2022-06-27 | End: 2022-07-13

## 2022-06-27 RX ORDER — ALPRAZOLAM 0.25 MG/1
1 TABLET ORAL DAILY PRN
Status: DISCONTINUED | OUTPATIENT
Start: 2022-06-27 | End: 2022-07-13

## 2022-06-27 RX ORDER — AMLODIPINE BESYLATE 10 MG/1
10 TABLET ORAL DAILY
Status: DISCONTINUED | OUTPATIENT
Start: 2022-06-28 | End: 2022-07-12

## 2022-06-27 RX ORDER — WARFARIN 2.5 MG/1
2.5 TABLET ORAL
Status: DISCONTINUED | OUTPATIENT
Start: 2022-06-29 | End: 2022-06-28

## 2022-06-27 RX ORDER — ONDANSETRON 2 MG/ML
4 INJECTION INTRAMUSCULAR; INTRAVENOUS
Status: COMPLETED | OUTPATIENT
Start: 2022-06-27 | End: 2022-06-27

## 2022-06-27 RX ORDER — AMIODARONE HYDROCHLORIDE 200 MG/1
400 TABLET ORAL DAILY
Status: DISCONTINUED | OUTPATIENT
Start: 2022-06-28 | End: 2022-07-13

## 2022-06-27 RX ORDER — CARVEDILOL 3.12 MG/1
3.12 TABLET ORAL 2 TIMES DAILY
Status: DISCONTINUED | OUTPATIENT
Start: 2022-06-27 | End: 2022-06-27

## 2022-06-27 RX ORDER — ALLOPURINOL 100 MG/1
100 TABLET ORAL DAILY
Status: DISCONTINUED | OUTPATIENT
Start: 2022-06-28 | End: 2022-07-13

## 2022-06-27 RX ORDER — WARFARIN SODIUM 5 MG/1
5 TABLET ORAL
Status: DISCONTINUED | OUTPATIENT
Start: 2022-07-05 | End: 2022-06-28

## 2022-06-27 RX ORDER — ASPIRIN 81 MG/1
81 TABLET ORAL DAILY
Status: DISCONTINUED | OUTPATIENT
Start: 2022-06-28 | End: 2022-06-29

## 2022-06-27 RX ORDER — SODIUM CHLORIDE 0.9 % (FLUSH) 0.9 %
10 SYRINGE (ML) INJECTION
Status: DISCONTINUED | OUTPATIENT
Start: 2022-06-27 | End: 2022-07-13

## 2022-06-27 RX ORDER — WARFARIN 2.5 MG/1
2.5 TABLET ORAL
Status: DISCONTINUED | OUTPATIENT
Start: 2022-06-30 | End: 2022-06-28

## 2022-06-27 RX ORDER — ZOLPIDEM TARTRATE 5 MG/1
5 TABLET ORAL NIGHTLY PRN
Refills: 5 | Status: DISCONTINUED | OUTPATIENT
Start: 2022-06-27 | End: 2022-07-13

## 2022-06-27 RX ORDER — WARFARIN SODIUM 5 MG/1
5 TABLET ORAL
Status: DISCONTINUED | OUTPATIENT
Start: 2022-06-28 | End: 2022-06-28

## 2022-06-27 RX ORDER — FUROSEMIDE 10 MG/ML
80 INJECTION INTRAMUSCULAR; INTRAVENOUS ONCE
Status: COMPLETED | OUTPATIENT
Start: 2022-06-27 | End: 2022-06-27

## 2022-06-27 RX ORDER — FUROSEMIDE 10 MG/ML
80 INJECTION INTRAMUSCULAR; INTRAVENOUS
Status: COMPLETED | OUTPATIENT
Start: 2022-06-27 | End: 2022-06-27

## 2022-06-27 RX ORDER — BUSPIRONE HYDROCHLORIDE 5 MG/1
5 TABLET ORAL 3 TIMES DAILY
Status: DISCONTINUED | OUTPATIENT
Start: 2022-06-27 | End: 2022-07-13

## 2022-06-27 RX ORDER — MEXILETINE HYDROCHLORIDE 250 MG/1
250 CAPSULE ORAL EVERY 8 HOURS
Status: DISCONTINUED | OUTPATIENT
Start: 2022-06-27 | End: 2022-07-13

## 2022-06-27 RX ORDER — MUPIROCIN 20 MG/G
OINTMENT TOPICAL 2 TIMES DAILY
Status: DISPENSED | OUTPATIENT
Start: 2022-06-27 | End: 2022-07-02

## 2022-06-27 RX ORDER — WARFARIN SODIUM 5 MG/1
5 TABLET ORAL
Status: DISCONTINUED | OUTPATIENT
Start: 2022-07-02 | End: 2022-06-28

## 2022-06-27 RX ORDER — ASPIRIN 325 MG
325 TABLET ORAL
Status: COMPLETED | OUTPATIENT
Start: 2022-06-27 | End: 2022-06-27

## 2022-06-27 RX ORDER — LEVOTHYROXINE SODIUM 112 UG/1
112 TABLET ORAL
Status: DISCONTINUED | OUTPATIENT
Start: 2022-06-28 | End: 2022-07-13

## 2022-06-27 RX ADMIN — FUROSEMIDE 80 MG: 10 INJECTION, SOLUTION INTRAMUSCULAR; INTRAVENOUS at 11:06

## 2022-06-27 RX ADMIN — AMLODIPINE BESYLATE 10 MG: 10 TABLET ORAL at 02:06

## 2022-06-27 RX ADMIN — FUROSEMIDE 80 MG: 10 INJECTION, SOLUTION INTRAMUSCULAR; INTRAVENOUS at 03:06

## 2022-06-27 RX ADMIN — ACETAMINOPHEN 650 MG: 325 TABLET ORAL at 09:06

## 2022-06-27 RX ADMIN — MEXILETINE HYDROCHLORIDE 250 MG: 250 CAPSULE ORAL at 09:06

## 2022-06-27 RX ADMIN — BUSPIRONE HYDROCHLORIDE 5 MG: 5 TABLET ORAL at 09:06

## 2022-06-27 RX ADMIN — ONDANSETRON 4 MG: 2 INJECTION INTRAMUSCULAR; INTRAVENOUS at 11:06

## 2022-06-27 RX ADMIN — NITROGLYCERIN 5 MCG/MIN: 20 INJECTION INTRAVENOUS at 11:06

## 2022-06-27 RX ADMIN — REMDESIVIR 200 MG: 100 INJECTION, POWDER, LYOPHILIZED, FOR SOLUTION INTRAVENOUS at 05:06

## 2022-06-27 RX ADMIN — ASPIRIN 325 MG ORAL TABLET 325 MG: 325 PILL ORAL at 11:06

## 2022-06-27 RX ADMIN — MUPIROCIN: 20 OINTMENT TOPICAL at 09:06

## 2022-06-27 RX ADMIN — ALPRAZOLAM 1 MG: 0.5 TABLET ORAL at 09:06

## 2022-06-27 RX ADMIN — FUROSEMIDE 20 MG/HR: 10 INJECTION, SOLUTION INTRAMUSCULAR; INTRAVENOUS at 08:06

## 2022-06-27 NOTE — HPI
55 yo male with PMH of HFrEF with an EF of 10% s/p HM2 admitted on 6/27 with acute respiratory failure secondary to COVID complicated with LVAD pump thrombosis that was refractory to medical therapy (failed integrillin). Underwent LVAD pump exchange with HM3 on 7/13. Post-op course complicated with worsening cardiogenic shock/RV failure requiring VA-ECMO. Improved clinically underwent successful decannulation. On 7/21, EP was initially consulted consulted due to episode of MMVT resulting in SQ-ICD discharge. Discharge successful and restoration of NSR. He was started on amiodarone gtt and restarted on home mexiletine. On 7/24, patient developed more frequent episodes of non sustained VT while on amiodarone PO, lidocaine, and mexiletine. He was restarted on IV amiodarone with improvement of his VT. Currently he is on mexelitine 400 q8h and amiodarone oral 200 mg daily. EP is re consulted regarding due to dizziness, nausea and poor appetite x 2.5 weeks and question about if mexelitine dose could be readjusted. He says his dizziness comes 5-10 min after taking mexelitine and is followed by nausea. He denies vertigo episodes. Today, he didn't eat breakfast tray, ate 25-50% of lunch tray and says isn't hungry for supper.

## 2022-06-27 NOTE — NURSING
Pt seen in the ER. No family in room. Pt is COVID positive. Pt is sitting on EOB with 2L oxygen via NC. Nitro gtt on. Remdesivir to be given by ID daquan. Cane noted at bedside; patient states he had to use the cane to walk from the car. VAD waveforms obtained and sent for reading (see below). Dr. Hadley and Dr. Chapman made aware.     Log File review by [employer] :    The log file captured routine events, there were no notable alarm conditions or parameter changes.    The VAD is functioning as intended.  Below are the log parameters & graph trending.    Patient ID RBB   Controller SW 7.29   Date range 6/8/2022 8:23 6/27/2022 14:11   Fixed Speed 9800   Low Speed Limit 9400   Actual Speed Avg 9782   Power max 9.4   Power min 6.4   Power avg 7.7   Flow max 10.6   Flow min 5.3   Flow avg 7.6   PI max 4.7   PI min 1.9   PI avg 3.2   PI event total 91

## 2022-06-27 NOTE — HPI
55 Y/O M with Hx of stage D HFrEF (EF 10%) due to NICM underwent HM2 implant 3/8/18 and exchange of outflow graft 3/9/18, Hx VT/VF s/p SICD 2014 presenting with gradual worsening shortness of breath associated with nonpleuritic, nonradiating bilateral 4/10 retrosternal chest pressure pain.  Symptoms began yesterday and have gradually worsened in the last 12 hours.  He does report going to a family picnic with increased consumption of chicken wings, ribs and other salty meat products.  Prior to symptom onset, he reports nausea, nonbloody diarrhea began yesterday and single episode of nonbloody nonbilious vomiting this morning. He also complains of dizziness, lightheadedness, and a mild HA. SOB worsened this morning prompting him to come to the ED.     In the ED, patient was in respiratory distress requiring BiPAP. MAP 96 and started on Nitro gtt. Work-up revealed WBC 10, K 5.4, Cr 2.5 (baseline 1.9), BNP 1950 (baseline 200-300s), Bili 2.1, LDH 1058, and INR 3.2. Bedside TTE with severely reduced EF, AV not opening, septum bulging into RV. Given IV Lasix 80 mg x1 with only 100-200 mL UOP and dark in color. HM2 interrogated and flows have been 8.5-9 L/min with no alarms or power surges. Cardiology consulted in the ED, dicussed with HTS, and decision made to admit to ICU for further mgmt.

## 2022-06-27 NOTE — SUBJECTIVE & OBJECTIVE
Interval Events:  No acute events overnight  No events on telemetry  The patient denies symptoms at this time    Past Medical History:   Diagnosis Date    Anticoagulant long-term use     CHF (congestive heart failure)     Dilated cardiomyopathy 1/10/2018    Disorder of kidney and ureter     CKD    Encounter for blood transfusion     Gout     HTN (hypertension)     Hx of psychiatric care     ICD (implantable cardioverter-defibrillator) infection 7/1/2020    Psychiatric problem     Thyroid disease     Ventricular tachycardia (paroxysmal)        Past Surgical History:   Procedure Laterality Date    CARDIAC CATHETERIZATION  Dec. 2012    CARDIAC DEFIBRILLATOR PLACEMENT Left     CRRT-D    COLONOSCOPY N/A 3/6/2018    Procedure: COLONOSCOPY;  Surgeon: Alonso Bone MD;  Location: [facility] ENDO (2ND FLR);  Service: Endoscopy;  Laterality: N/A;    COLONOSCOPY N/A 7/17/2019    Procedure: COLONOSCOPY;  Surgeon: Blane Valdez MD;  Location: [facility] ENDO (2ND FLR);  Service: Endoscopy;  Laterality: N/A;    COLONOSCOPY N/A 7/18/2019    Procedure: COLONOSCOPY;  Surgeon: Blane Valdez MD;  Location: [facility] ENDO (2ND FLR);  Service: Endoscopy;  Laterality: N/A;    ESOPHAGOGASTRODUODENOSCOPY N/A 7/17/2019    Procedure: EGD (ESOPHAGOGASTRODUODENOSCOPY);  Surgeon: Blane Valdez MD;  Location: [facility] ENDO (2ND FLR);  Service: Endoscopy;  Laterality: N/A;    ESOPHAGOGASTRODUODENOSCOPY N/A 7/18/2019    Procedure: EGD (ESOPHAGOGASTRODUODENOSCOPY);  Surgeon: Blane Valdez MD;  Location: [facility] ENDO (2ND FLR);  Service: Endoscopy;  Laterality: N/A;    NONINVASIVE CARDIAC ELECTROPHYSIOLOGY STUDY N/A 10/18/2019    Procedure: CARDIAC ELECTROPHYSIOLOGY STUDY, NONINVASIVE;  Surgeon: Raz Wagner MD;  Location: [facility] EP LAB;  Service: Cardiology;  Laterality: N/A;  VT, DFTs, MDT CRTD in situ, LVAD, LASHELL pizarro, 1656    REPLACEMENT OF IMPLANTABLE CARDIOVERTER-DEFIBRILLATOR (ICD) GENERATOR N/A 3/9/2020    Procedure: REPLACEMENT, ICD GENERATOR;  Surgeon: Harry Yun,  MD;  Location: [facility] EP LAB;  Service: Cardiology;  Laterality: N/A;  VT, ICD Gen Change and Lead Revision, MDT, MAC, DM,3 Prep    REVISION OF IMPLANTABLE CARDIOVERTER-DEFIBRILLATOR (ICD) ELECTRODE LEAD PLACEMENT N/A 3/9/2020    Procedure: REVISION, INSERTION, ELECTRODE LEAD, ICD;  Surgeon: Harry Yun MD;  Location: [facility] EP LAB;  Service: Cardiology;  Laterality: N/A;  VT, ICD Gen Change and Lead Revision, MDT, MAC, DM,3 Prep    TREATMENT OF CARDIAC ARRHYTHMIA  10/18/2019    Procedure: Cardioversion or Defibrillation;  Surgeon: Raz Wagner MD;  Location: [facility] EP LAB;  Service: Cardiology;;       Review of patient's allergies indicates:   Allergen Reactions    Lisinopril Anaphylaxis    Hydralazine analogues      Chronic constipation, impotence, dizziness       Current Facility-Administered Medications   Medication    carvediloL tablet 3.125 mg    furosemide (LASIX) 200 mg in dextrose 5 % 100 mL continuous infusion (conc: 2 mg/mL)    furosemide injection 80 mg    nitroGLYCERIN 50 mg in dextrose 5 % 250 mL infusion (TITRATING)     Current Outpatient Medications   Medication Sig    allopurinoL (ZYLOPRIM) 100 MG tablet TAKE 1 TABLET (100 MG) BY MOUTH NIGHTLY.    ALPRAZolam (XANAX) 1 MG tablet Take 1 tablet (1 mg total) by mouth daily as needed for Anxiety. Bid prn    amiodarone (PACERONE) 200 MG Tab Take 2 tablets (400 mg total) by mouth once daily.    amLODIPine (NORVASC) 10 MG tablet TAKE 1 TABLET BY MOUTH EVERY DAY    aspirin (ECOTRIN) 81 MG EC tablet Take 1 tablet (81 mg total) by mouth once daily.    busPIRone (BUSPAR) 5 MG Tab Take 1 tablet (5 mg total) by mouth 3 (three) times daily.    carvediloL (COREG) 3.125 MG tablet Take 3.125 mg by mouth once daily.    ferrous gluconate (FERGON) 324 MG tablet TAKE 1 TABLET (324 MG TOTAL) BY MOUTH WITH LUNCH.    levothyroxine (SYNTHROID) 112 MCG tablet Take 1 tablet (112 mcg total) by mouth before breakfast.    mexiletine (MEXITIL) 250 MG Cap Take 1 capsule (250  mg total) by mouth every 8 (eight) hours.    pantoprazole (PROTONIX) 40 MG tablet TAKE 1 TABLET (40 MG TOTAL) BY MOUTH DAILY WITH LUNCH.    potassium chloride SA (K-DUR,KLOR-CON) 20 MEQ tablet Take 1 tablet (20 mEq total) by mouth 2 (two) times daily.    potassium chloride SA (K-DUR,KLOR-CON) 20 MEQ tablet TAKE 1 TABLET BY MOUTH TWICE A DAY    sildenafiL (VIAGRA) 100 MG tablet Take 1 tablet (100 mg total) by mouth daily as needed for Erectile Dysfunction.    torsemide (DEMADEX) 20 MG Tab Take 1 tablet (20 mg total) by mouth once daily.    vitamin D (VITAMIN D3) 1000 units Tab Take 1,000 Units by mouth once daily.    warfarin (COUMADIN) 5 MG tablet TAKE 7.5 MG ORALLY DAILY EVERY  AND THURSDAY, TAKE 5 MG ORALLY DAILY ON OTHER DAYS    zolpidem (AMBIEN CR) 12.5 MG CR tablet Take 1 tablet (12.5 mg total) by mouth nightly as needed for Insomnia.     Family History       Problem Relation (Age of Onset)    Cancer Sister (54)    Coronary artery disease Father    Diabetes Father    Heart disease Father    Hypertension Father    No Known Problems Mother, Brother          Tobacco Use    Smoking status: Former Smoker     Packs/day: 1.00     Years: 31.00     Pack years: 31.00     Types: Cigarettes     Quit date: 2018     Years since quittin.4    Smokeless tobacco: Never Used    Tobacco comment: pt is quiting on his own - pt stated not qualified for program;  pt  quit on his own   Substance and Sexual Activity    Alcohol use: No     Alcohol/week: 0.0 standard drinks     Comment: quit    Drug use: No    Sexual activity: Yes     Partners: Female     Birth control/protection: None     Comment: 10/5/17  with same partner 7 years      Review of Systems   Constitutional:  Positive for activity change, appetite change and fatigue.   HENT: Negative.     Eyes: Negative.    Respiratory:  Positive for shortness of breath. Negative for cough and chest tightness.    Cardiovascular:  Positive for leg swelling. Negative  for chest pain and palpitations.   Gastrointestinal:  Positive for abdominal pain, diarrhea, nausea and vomiting. Negative for blood in stool.   Endocrine: Negative.    Genitourinary: Negative.    Musculoskeletal: Negative.    Skin: Negative.    Allergic/Immunologic: Negative.    Neurological:  Positive for dizziness and light-headedness.   Hematological: Negative.    Psychiatric/Behavioral: Negative.     Objective:     Vital Signs (Most Recent):  Temp: 98 °F (36.7 °C) (06/27/22 1038)  Pulse: 91 (06/27/22 1151)  Resp: (!) 32 (06/27/22 1151)  BP: (S) (!) 96/0 (Doppler for LVAD) (06/27/22 1219)  SpO2:  (not reg) (06/27/22 1038)   Vital Signs (24h Range):  Temp:  [98 °F (36.7 °C)] 98 °F (36.7 °C)  Pulse:  [91-93] 91  Resp:  [20-36] 32  BP: (94-96)/(0) 96/0     Patient Vitals for the past 72 hrs (Last 3 readings):   Weight   06/27/22 1038 111.1 kg (245 lb)     Body mass index is 32.32 kg/m².    No intake or output data in the 24 hours ending 06/27/22 1459    Physical Exam  Constitutional:       General: He is not in acute distress.     Appearance: He is obese.      Comments: Pleasant middle age male   HENT:      Head: Normocephalic.      Mouth/Throat:      Mouth: Mucous membranes are moist.   Eyes:      Extraocular Movements: Extraocular movements intact.   Neck:      Comments: JVD 12-14 cm  Cardiovascular:      Rate and Rhythm: Normal rate and regular rhythm.      Comments: VAD hum appreciated  Pulses detected via doppler  Pulmonary:      Effort: No respiratory distress.      Breath sounds: Normal breath sounds.      Comments: Mild tachypnea  Abdominal:      General: Bowel sounds are normal. There is no distension.      Palpations: Abdomen is soft.      Tenderness: There is abdominal tenderness.      Comments: Hepatomegaly measuring 16 cm   Musculoskeletal:      Cervical back: Neck supple.      Right lower leg: Edema present.      Left lower leg: Edema present.   Skin:     General: Skin is warm.   Neurological:       General: No focal deficit present.      Mental Status: He is oriented to person, place, and time.   Psychiatric:         Mood and Affect: Mood normal.         Behavior: Behavior normal.       Significant Labs:  CBC:  Recent Labs   Lab 06/27/22  1116 06/27/22  1125   WBC 10.06  --    RBC 4.73  --    HGB 12.6*  --    HCT 40.6 41     --    MCV 86  --    MCH 26.6*  --    MCHC 31.0*  --      BNP:  Recent Labs   Lab 06/27/22  1116   BNP 1,951*     CMP:  Recent Labs   Lab 06/27/22  1116   *   CALCIUM 10.1   ALBUMIN 3.7   PROT 8.5*   *   K 5.4*   CO2 17*      BUN 32*   CREATININE 2.5*   ALKPHOS 118   ALT 67*   AST 99*   BILITOT 2.1*      Coagulation:   Recent Labs   Lab 06/23/22  1400 06/27/22  1116   INR 2.9* 3.2*     LDH:  Recent Labs   Lab 06/27/22  1243   LDH 1,058*     Microbiology:  Microbiology Results (last 7 days)       ** No results found for the last 168 hours. **          I have reviewed all pertinent labs within the past 24 hours.    Diagnostic Results:  TTE 6/28/22  There is an LVAD present. Base speed is 9800 RPMs. The pump type is a Heartmate II. Ramp study. At 9800 rpm the mitral valve leaflets fail to coapt and septum bows slightly to the right with intermittent aortic valve opening with LVEDd 9.3 cm, severe MR and mild AR. At 68091 rpm the septum appears more midline and LVIDd measures 9 cm with slightly reduced mitral regurgitation (better leaflet coaptation) but slightly worse AR (moderate).  The left ventricle is severely enlarged with severely decreased systolic function. The estimated ejection fraction is 10%.  There is severe left ventricular global hypokinesis.  Moderately reduced right ventricular systolic function.  Left ventricular diastolic dysfunction.  Severe mitral regurgitation.      TTE 5/19/2022  There is an LVAD present. Base speed is 9800 RPMs. The pump type is a Heartmate III. The interventricular septum appears to bow into the right ventricle. The aortic  valve does not open.  The left ventricle is severely enlarged with severe eccentric hypertrophy and severely decreased systolic function.  The estimated ejection fraction is 10%.  There is severe left ventricular global hypokinesis.  There is abnormal septal wall motion.  Grade I left ventricular diastolic dysfunction.  Severe left atrial enlargement.  Normal right ventricular size with mildly reduced right ventricular systolic function.  Moderate right atrial enlargement.  Mild-to-moderate mitral regurgitation.  Mild tricuspid regurgitation.  Intermediate central venous pressure (8 mmHg).  The estimated PA systolic pressure is 26 mmHg.  The ascending aorta is dilated.

## 2022-06-27 NOTE — H&P
[facility] Emergency Dept  Transplant Heart  Progress Note    Patient Name: Tim Richards  MRN: [id number]  Admission Date: 6/27/2022  Hospital Length of Stay: 0 days  Code Status: Full Code   Attending Physician: KOREY Hadley MD  Primary Care Physician: Deyanira Booth MD  Expected Discharge Date:   Principal Problem:<principal problem not specified>    HPI   56-yo M with hx of Stage D HFrEF (NICMP, EF 10%, previously NYHA II), s/p  HM2 implanted in march/2018, SC ICD, VT on amiodarone and mexiletine and amiodarone induced hypothyroidism. Pt refers that he was in his usual state until 3-4 days ago when he visited a family member's house and had dietary indiscretions. Refers that since then he has had low appetite. Also states today he started to notice a dark urine and increasing SOB so he decided to come to the ED. He denies palpitaitons, ICD shocks, but refers some chest tightness.     He also refers chills and soft stools for the past 2 days.      Denied episodes of bleeding.          Home meds:  No current facility-administered medications on file prior to encounter.     Current Outpatient Medications on File Prior to Encounter   Medication Sig Dispense Refill    allopurinoL (ZYLOPRIM) 100 MG tablet TAKE 1 TABLET (100 MG) BY MOUTH NIGHTLY. 90 tablet 3    ALPRAZolam (XANAX) 1 MG tablet Take 1 tablet (1 mg total) by mouth daily as needed for Anxiety. Bid prn 30 tablet 1    amiodarone (PACERONE) 200 MG Tab Take 2 tablets (400 mg total) by mouth once daily. 180 tablet 3    amLODIPine (NORVASC) 10 MG tablet TAKE 1 TABLET BY MOUTH EVERY DAY 90 tablet 3    aspirin (ECOTRIN) 81 MG EC tablet Take 1 tablet (81 mg total) by mouth once daily. 30 tablet 11    busPIRone (BUSPAR) 5 MG Tab Take 1 tablet (5 mg total) by mouth 3 (three) times daily. 90 tablet 1    levothyroxine (SYNTHROID) 112 MCG tablet Take 1 tablet (112 mcg total) by mouth before breakfast. 90 tablet 3    mexiletine (MEXITIL) 250 MG Cap Take 1  capsule (250 mg total) by mouth every 8 (eight) hours. 270 capsule 3    pantoprazole (PROTONIX) 40 MG tablet TAKE 1 TABLET (40 MG TOTAL) BY MOUTH DAILY WITH LUNCH. 90 tablet 3    warfarin (COUMADIN) 5 MG tablet TAKE 7.5 MG ORALLY DAILY EVERY SUNDAY AND THURSDAY, TAKE 5 MG ORALLY DAILY ON OTHER DAYS 135 tablet 3    zolpidem (AMBIEN CR) 12.5 MG CR tablet Take 1 tablet (12.5 mg total) by mouth nightly as needed for Insomnia. 30 tablet 5    [DISCONTINUED] carvediloL (COREG) 3.125 MG tablet Take 3.125 mg by mouth once daily.      [DISCONTINUED] ferrous gluconate (FERGON) 324 MG tablet TAKE 1 TABLET (324 MG TOTAL) BY MOUTH WITH LUNCH. 90 tablet 3    [DISCONTINUED] potassium chloride SA (K-DUR,KLOR-CON) 20 MEQ tablet Take 1 tablet (20 mEq total) by mouth 2 (two) times daily. 180 tablet 3    [DISCONTINUED] potassium chloride SA (K-DUR,KLOR-CON) 20 MEQ tablet TAKE 1 TABLET BY MOUTH TWICE A  tablet 1    [DISCONTINUED] sildenafiL (VIAGRA) 100 MG tablet Take 1 tablet (100 mg total) by mouth daily as needed for Erectile Dysfunction. 6 tablet 3    [DISCONTINUED] torsemide (DEMADEX) 20 MG Tab Take 1 tablet (20 mg total) by mouth once daily. 180 tablet 3    [DISCONTINUED] vitamin D (VITAMIN D3) 1000 units Tab Take 1,000 Units by mouth once daily.                 Objective     Vitals:    06/27/22 1129 06/27/22 1139 06/27/22 1151 06/27/22 1219   BP: (!) 94/0 (!) 94/0  Comment: Doppler for LVAD  (S) (!) 96/0  Comment: Doppler for LVAD   BP Location: Left arm      Pulse:   91    Resp:   (!) 32    Temp:       TempSrc:       SpO2:       Weight:       Height:           No intake/output data recorded.  No intake/output data recorded.         Physical Examination: General appearance - alert, well appearing, and in no distress  Mental status - alert, oriented to person, place, and time  Neck - JVD up to angle of mandible  Chest - clear to auscultation, no wheezes, rales or rhonchi, symmetric air entry  Heart - normal rate,  regular rhythm, VAD hum  Abdomen - soft, nontender, nondistended, no masses, 15cm liver  Neurological - alert, oriented, normal speech, no focal findings or movement disorder noted  Extremities - BLE edema 1+      Scheduled Meds:   [START ON 6/28/2022] allopurinoL  100 mg Oral Daily    [START ON 6/28/2022] amiodarone  400 mg Oral Daily    [START ON 6/28/2022] amLODIPine  10 mg Oral Daily    [START ON 6/28/2022] aspirin  81 mg Oral Daily    busPIRone  5 mg Oral TID    furosemide (LASIX) injection  80 mg Intravenous Once    [START ON 6/28/2022] levothyroxine  112 mcg Oral Before breakfast    mexiletine  250 mg Oral Q8H    [START ON 6/28/2022] pantoprazole  40 mg Oral Daily with lunch    remdesivir loading dose infusion  200 mg Intravenous Q24H    Followed by    [START ON 6/28/2022] remdesivir infusion  100 mg Intravenous Daily    warfarin  5 mg Oral Daily     Continuous Infusions:   furosemide (LASIX) 2 mg/mL continuous infusion (non-titrating)       PRN Meds:.ALPRAZolam, sodium chloride 0.9%, zolpidem      Labs  BMP  Lab Results   Component Value Date     (L) 06/27/2022    K 5.4 (H) 06/27/2022     06/27/2022    CO2 17 (L) 06/27/2022    BUN 32 (H) 06/27/2022    CREATININE 2.5 (H) 06/27/2022    CALCIUM 10.1 06/27/2022    ANIONGAP 15 06/27/2022    ESTGFRAFRICA 32.2 (A) 06/27/2022    EGFRNONAA 27.9 (A) 06/27/2022       Lab Results   Component Value Date    WBC 10.06 06/27/2022    HGB 12.6 (L) 06/27/2022    HCT 41 06/27/2022    MCV 86 06/27/2022     06/27/2022         BNP  Recent Labs   Lab 06/27/22  1116   BNP 1,951*       Lab Results   Component Value Date    ALT 67 (H) 06/27/2022    AST 99 (H) 06/27/2022    ALKPHOS 118 06/27/2022    BILITOT 2.1 (H) 06/27/2022       Lab Results   Component Value Date    CHOL 130 05/19/2022    CHOL 170 11/11/2021    CHOL 230 (H) 05/05/2021     Lab Results   Component Value Date    HDL 46 05/19/2022    HDL 55 11/11/2021    HDL 43 05/05/2021     Lab Results    Component Value Date    LDLCALC 54.8 (L) 05/19/2022    LDLCALC 93.2 11/11/2021    LDLCALC 143.2 05/05/2021     Lab Results   Component Value Date    TRIG 146 05/19/2022    TRIG 109 11/11/2021    TRIG 219 (H) 05/05/2021     Lab Results   Component Value Date    CHOLHDL 35.4 05/19/2022    CHOLHDL 32.4 11/11/2021    CHOLHDL 18.7 (L) 05/05/2021       Cardiac studies  Results for orders placed during the hospital encounter of 05/19/22    Echo Color Flow Doppler? Yes    Interpretation Summary  · There is an LVAD present. Base speed is 9800 RPMs. The pump type is a Heartmate III. The interventricular septum appears to bow into the right ventricle. The aortic valve does not open.  · The left ventricle is severely enlarged with severe eccentric hypertrophy and severely decreased systolic function.  · The estimated ejection fraction is 10%.  · There is severe left ventricular global hypokinesis.  · There is abnormal septal wall motion.  · Grade I left ventricular diastolic dysfunction.  · Severe left atrial enlargement.  · Normal right ventricular size with mildly reduced right ventricular systolic function.  · Moderate right atrial enlargement.  · Mild-to-moderate mitral regurgitation.  · Mild tricuspid regurgitation.  · Intermediate central venous pressure (8 mmHg).  · The estimated PA systolic pressure is 26 mmHg.  · The ascending aorta is dilated.      Results for orders placed or performed during the hospital encounter of 02/23/21   EKG 12-lead    Collection Time: 02/23/21  8:33 PM    Narrative    Test Reason :     Vent. Rate : 086 BPM     Atrial Rate : 086 BPM     P-R Int : 230 ms          QRS Dur : 150 ms      QT Int : 450 ms       P-R-T Axes : 080 116 -62 degrees     QTc Int : 538 ms    Sinus rhythm with 1st degree A-V block with Fusion complexes  Right axis deviation  Nonspecific intraventricular block  Abnormal ECG  When compared with ECG of 23-FEB-2021 20:32,  No significant change was found  Confirmed by Effzia  MD, Byron ([id number]) on 2/24/2021 6:39:21 PM    Referred By: [email or web address]   SELF           Confirmed By:Byron Piña MD             Assessment and Plan:   56-yo M with hx of Stage D HFrEF (NICMP, EF 10%, previously NYHA II), s/p  HM2 implanted in march/2018, SC ICD, VT on amiodarone and mexiletine and amiodarone induced hypothyroidism. Patient with volume overload (JVD up to angle of mandible, extremity edema and hepatomegaly, elevated BNP, low sodium and low hemoglobin. Overall warm and wet, will admit to ICU for lasix drip. Also found to have covid 19 so will treat with remdesivir initially as per id recs.    #ADHF  #NICMP  #LVAD HM2  -acutely decompensated HF, warm and wet with presence of HM2 LVAD   -Hypoxic, improves kwan  -Hypertensive (MAP 96mmHg)  and severely volume overloaded (BNP almost 2K, hyponatremic, pulm edema, hepatomelgaly)  -Minimal response to lasix iv once  -Will continue Amlodipine 10mg daily  -Will continue Warfarin  -Lasix bolus x2 + gtt at 20mg/hr  -Strict I/O  -Bipap  -Admitted to ICU      TXP LVAD INTERROGATIONS 6/27/2022 5/19/2022 3/17/2022 1/19/2022 11/11/2021 9/16/2021 5/5/2021   Type HeartMate II - - - - - -   Flow 5.6 - - - - - -   Speed 9800 - - - - - -   PI 2.9 - - - - - -   Power ([place]) 6.4 - - - - - -   LSL - - - - - - -   Pulsatility No Pulse No Pulse No Pulse No Pulse No Pulse No Pulse No Pulse     #WAGNER  -Baseline Cr ~2, now 2.5  -Urine sodium extremely low for prior diuretic use  -Probably cardiorenal sx but will confirm with urine lytes and UA    #Elevated LFTs  -AST>ALT, TB also elevated  -Likely from hepatic congestion, will f/u daily    #Elevated LDH  -Usually an elevated LDH is a red flag in a patient with LVAD, especially HM2 given the risk of pump thrombosis but PI, power and flow are similar to prior clinic visits and covid-19 infection can increase LDH  -Power is actually slightly lower than prior visit clinics  -Will complete workup repeating LDH and obtaining haptoglobin,  plasma free hemoglobin and ua (hemoglobin and rbc)    #Covid-19 infection  -Hypoxia could be explained by pulmonary edema  -Refers chills, nausea and soft stools  -ID consulted, recommended Remdesivir, ordered    #Hx of VT  -s/p SC ICD   -Will continue mexiletine 25mg q8hrs and amiodarone 400mg daily  -Keep K>4 and Mg>2    #Amiodarone induced hypothyroidism  -Will continue amiodarone given repeated episodes of VT  -Continue levothyroxine 112 mcg     #Anxiety  -Zolpidem night prn  -Alprazolam prn anxiety    Ari Yun MD  Cardiology, PGY-IV  Transplant Heart  [facility] - Emergency Dept

## 2022-06-27 NOTE — ED PROVIDER NOTES
Encounter Date: 6/27/2022       History     Chief Complaint   Patient presents with    Shortness of Breath    Diarrhea    Vomiting     Lvad cood caden arshad notifies     57 Y/O M with Hx of stage D CHF due to NICM underwent HM 2 implant 3/8/18 and exchange of outflow graft 3/9/18 C/O gradual worsening shortness of breath associated with nonpleuritic, nonradiating bilateral 4/10 retrosternal chest pressure pain.  Symptoms began yesterday and have gradually worsened in the last 12 hours.  He does report going to a family picnic with increased consumption of chicken wings, ribs and other salty meat products.  Prior to symptom onset, he reports nausea, nonbloody diarrhea began yesterday and single episode of nonbloody nonbilious vomiting this morning. He also complains of dizziness, lightheadedness, and a mild HA. Pt has LVAD in place.          Review of patient's allergies indicates:   Allergen Reactions    Lisinopril Anaphylaxis    Hydralazine analogues      Chronic constipation, impotence, dizziness     Past Medical History:   Diagnosis Date    Anticoagulant long-term use     CHF (congestive heart failure)     Dilated cardiomyopathy 1/10/2018    Disorder of kidney and ureter     CKD    Encounter for blood transfusion     Gout     HTN (hypertension)     Hx of psychiatric care     ICD (implantable cardioverter-defibrillator) infection 7/1/2020    Psychiatric problem     Thyroid disease     Ventricular tachycardia (paroxysmal)      Past Surgical History:   Procedure Laterality Date    CARDIAC CATHETERIZATION  Dec. 2012    CARDIAC DEFIBRILLATOR PLACEMENT Left     CRRT-D    COLONOSCOPY N/A 3/6/2018    Procedure: COLONOSCOPY;  Surgeon: Alonso Bone MD;  Location: [facility] ([address] [place] [address]);  Service: Endoscopy;  Laterality: N/A;    COLONOSCOPY N/A 7/17/2019    Procedure: COLONOSCOPY;  Surgeon: Blane Valdez MD;  Location: [facility] ([address] [place] [address]);  Service: Endoscopy;  Laterality: N/A;    COLONOSCOPY N/A 7/18/2019     Procedure: COLONOSCOPY;  Surgeon: Blane Valdez MD;  Location: [facility] ENDO (2ND FLR);  Service: Endoscopy;  Laterality: N/A;    ESOPHAGOGASTRODUODENOSCOPY N/A 7/17/2019    Procedure: EGD (ESOPHAGOGASTRODUODENOSCOPY);  Surgeon: Blane Valdez MD;  Location: [facility] ENDO (2ND FLR);  Service: Endoscopy;  Laterality: N/A;    ESOPHAGOGASTRODUODENOSCOPY N/A 7/18/2019    Procedure: EGD (ESOPHAGOGASTRODUODENOSCOPY);  Surgeon: Blane Valdez MD;  Location: [facility] ENDO (2ND FLR);  Service: Endoscopy;  Laterality: N/A;    NONINVASIVE CARDIAC ELECTROPHYSIOLOGY STUDY N/A 10/18/2019    Procedure: CARDIAC ELECTROPHYSIOLOGY STUDY, NONINVASIVE;  Surgeon: Raz Wagner MD;  Location: [facility] EP LAB;  Service: Cardiology;  Laterality: N/A;  VT, DFTs, MDT CRTD in situ, LVAD, anes, MB, 3098    REPLACEMENT OF IMPLANTABLE CARDIOVERTER-DEFIBRILLATOR (ICD) GENERATOR N/A 3/9/2020    Procedure: REPLACEMENT, ICD GENERATOR;  Surgeon: Harry Yun MD;  Location: [facility] EP LAB;  Service: Cardiology;  Laterality: N/A;  VT, ICD Gen Change and Lead Revision, MDT, MAC, DM,3 Prep    REVISION OF IMPLANTABLE CARDIOVERTER-DEFIBRILLATOR (ICD) ELECTRODE LEAD PLACEMENT N/A 3/9/2020    Procedure: REVISION, INSERTION, ELECTRODE LEAD, ICD;  Surgeon: Harry Yun MD;  Location: [facility] EP LAB;  Service: Cardiology;  Laterality: N/A;  VT, ICD Gen Change and Lead Revision, MDT, MAC, DM,3 Prep    TREATMENT OF CARDIAC ARRHYTHMIA  10/18/2019    Procedure: Cardioversion or Defibrillation;  Surgeon: Raz Wagner MD;  Location: [facility] EP LAB;  Service: Cardiology;;     Family History   Problem Relation Age of Onset    Hypertension Father     Diabetes Father     Coronary artery disease Father     Heart disease Father         CHF    No Known Problems Mother     Cancer Sister 54        breast CA    No Known Problems Brother     Anxiety disorder Neg Hx     Depression Neg Hx     Dementia Neg Hx     Bipolar disorder Neg Hx     Suicide Neg Hx      Social History      Tobacco Use    Smoking status: Former Smoker     Packs/day: 1.00     Years: 31.00     Pack years: 31.00     Types: Cigarettes     Quit date: 2018     Years since quittin.4    Smokeless tobacco: Never Used    Tobacco comment: pt is quiting on his own - pt stated not qualified for program;  pt  quit on his own   Substance Use Topics    Alcohol use: No     Alcohol/week: 0.0 standard drinks     Comment: quit    Drug use: No     Review of Systems   Constitutional: Negative for chills, fatigue and fever.   HENT: Negative for congestion, rhinorrhea, sinus pressure, sore throat, tinnitus, trouble swallowing and voice change.    Respiratory: Positive for chest tightness (Bilateral 4/10 pressure) and shortness of breath.    Cardiovascular: Positive for chest pain. Negative for palpitations and leg swelling.   Gastrointestinal: Positive for diarrhea ( not black or bloody), nausea and vomiting ( nonbilious and non bloody). Negative for abdominal pain, blood in stool and constipation.   Genitourinary: Negative for decreased urine volume, difficulty urinating, hematuria and testicular pain.   Musculoskeletal:        Right Shoulder pain   Neurological: Positive for headaches (mild).       Physical Exam     Initial Vitals   BP Pulse Resp Temp SpO2   22 1129 22 1038 22 1038 22 1038 22 1810   (!) 94/0 93 20 98 °F (36.7 °C) 95 %      MAP       --                Physical Exam    Constitutional: He appears well-developed and well-nourished. He is diaphoretic. He is active and cooperative.  Non-toxic appearance. He does not have a sickly appearance. He does not appear ill. He appears distressed (Secondary to tachypneic state).   HENT:   Head: Normocephalic and atraumatic.   Eyes: EOM are normal.   Neck: Phonation normal. No tracheal tenderness present. No tracheal deviation present.   Normal range of motion.  Cardiovascular: Normal rate and regular rhythm.   No murmur (LVAD in place)  heard.  +LVAD humming   Pulmonary/Chest: Accessory muscle usage present. Tachypnea noted. He is in respiratory distress (mild to mod). He has no wheezes. He has no rhonchi. He has rales ( fine bibasilar).   Abdominal: Abdomen is soft.   LVAD in place, drive-line palpable    Musculoskeletal:      Cervical back: Normal range of motion. No rigidity.     Neurological: He is alert and oriented to person, place, and time.   Skin: Skin is warm. Capillary refill takes less than 2 seconds.         ED Course   Procedures  Labs Reviewed   PROTIME-INR - Abnormal; Notable for the following components:       Result Value    Prothrombin Time 30.8 (*)     INR 3.2 (*)     All other components within normal limits    Narrative:     Release to patient->Immediate   C-REACTIVE PROTEIN - Abnormal; Notable for the following components:    CRP 91.8 (*)     All other components within normal limits    Narrative:     Release to patient->Immediate   CBC W/ AUTO DIFFERENTIAL - Abnormal; Notable for the following components:    Hemoglobin 12.6 (*)     MCH 26.6 (*)     MCHC 31.0 (*)     Gran # (ANC) 7.9 (*)     Lymph # 0.9 (*)     Mono # 1.3 (*)     Gran % 78.2 (*)     Lymph % 8.7 (*)     All other components within normal limits    Narrative:     Release to patient->Immediate   COMPREHENSIVE METABOLIC PANEL - Abnormal; Notable for the following components:    Sodium 132 (*)     Potassium 5.4 (*)     CO2 17 (*)     Glucose 115 (*)     BUN 32 (*)     Creatinine 2.5 (*)     Total Protein 8.5 (*)     Total Bilirubin 2.1 (*)     AST 99 (*)     ALT 67 (*)     eGFR if  32.2 (*)     eGFR if non  27.9 (*)     All other components within normal limits    Narrative:     Release to patient->Immediate   TROPONIN I - Abnormal; Notable for the following components:    Troponin I 0.083 (*)     All other components within normal limits    Narrative:     Release to patient->Immediate   TROPONIN I - Abnormal; Notable for the  following components:    Troponin I 0.096 (*)     All other components within normal limits    Narrative:     ADD ON CPK PER ILEANA JASMINE MD ORDER# [id number] @  06/27/2022    14:36    B-TYPE NATRIURETIC PEPTIDE - Abnormal; Notable for the following components:    BNP 1,951 (*)     All other components within normal limits    Narrative:     Release to patient->Immediate   LACTATE DEHYDROGENASE - Abnormal; Notable for the following components:    LD 1,058 (*)     All other components within normal limits   SODIUM, URINE, RANDOM - Abnormal; Notable for the following components:    Sodium, Urine <10 (*)     All other components within normal limits    Narrative:     Specimen Source->Urine   LACTATE DEHYDROGENASE - Abnormal; Notable for the following components:    LD 1,133 (*)     All other components within normal limits   HAPTOGLOBIN - Abnormal; Notable for the following components:    Haptoglobin <10 (*)     All other components within normal limits   COMPREHENSIVE METABOLIC PANEL - Abnormal; Notable for the following components:    Sodium 130 (*)     Potassium 5.7 (*)     CO2 22 (*)     BUN 34 (*)     Creatinine 2.8 (*)     Total Bilirubin 2.0 (*)      (*)     ALT 73 (*)     eGFR if  28.1 (*)     eGFR if non  24.3 (*)     All other components within normal limits   SARS-COV-2 RDRP GENE - Abnormal; Notable for the following components:    POC Rapid COVID Positive (*)     All other components within normal limits    Narrative:     This test utilizes isothermal nucleic acid amplification   technology to detect the SARS-CoV-2 RdRp nucleic acid segment.   The analytical sensitivity (limit of detection) is 125 genome   equivalents/mL.   A POSITIVE result implies infection with the SARS-CoV-2 virus;   the patient is presumed to be contagious.     A NEGATIVE result means that SARS-CoV-2 nucleic acids are not   present above the limit of detection. A NEGATIVE result should be    treated as presumptive. It does not rule out the possibility of   COVID-19 and should not be the sole basis for treatment decisions.   If COVID-19 is strongly suspected based on clinical and exposure   history, re-testing using an alternate molecular assay should be   considered.   This test is only for use under the Food and Drug   Administration s Emergency Use Authorization (EUA).   Commercial kits are provided by [employer].   Performance characteristics of the EUA have been independently   verified by [facility] Department of   Pathology and Laboratory Medicine.   _________________________________________________________________   The authorized Fact Sheet for Healthcare Providers and the authorized Fact   Sheet for Patients of the ID NOW COVID-19 are available on the FDA   website:     https://www.fda.gov/media/742792/download  https://www.fda.gov/media/491794/download          ISTAT PROCEDURE - Abnormal; Notable for the following components:    POC PO2 32 (*)     POC HCO3 23.1 (*)     POC SATURATED O2 62 (*)     All other components within normal limits   ISTAT PROCEDURE - Abnormal; Notable for the following components:    POC Glucose 124 (*)     POC BUN 33 (*)     POC Creatinine 2.6 (*)     POC Sodium 132 (*)     POC Potassium 5.3 (*)     POC TCO2 (MEASURED) 21 (*)     All other components within normal limits   HIV 1 / 2 ANTIBODY    Narrative:     Release to patient->Immediate   HEPATITIS C ANTIBODY    Narrative:     Release to patient->Immediate   CK   CREATININE, URINE, RANDOM    Narrative:     Specimen Source->Urine   OSMOLALITY, URINE RANDOM    Narrative:     Add on UUNR per Ari Yun MD on  06/27/2022  15:38 [id number]  Specimen Source->Urine   CK    Narrative:     ADD ON CPK PER ILEANA JASMINE MD ORDER# [id number] @  06/27/2022    14:36    UREA NITROGEN, URINE, RANDOM   MAGNESIUM   PHOSPHORUS   UREA NITROGEN, URINE, RANDOM    Narrative:     Add on UUNR per Ari Arias  MD Jama on  06/27/2022  15:38 [id number]  Specimen Source->Urine   URINALYSIS   PLASMA FREE HEMOGLOBIN        ECG Results          EKG 12-lead (Final result)  Result time 06/27/22 16:16:31    Final result by Interface, Lab In [facility] (06/27/22 16:16:31)                 Narrative:    Test Reason : SOB    Vent. Rate : 093 BPM     Atrial Rate : 093 BPM     P-R Int : 176 ms          QRS Dur : 104 ms      QT Int : 442 ms       P-R-T Axes : 000 140 -18 degrees     QTc Int : 549 ms    Normal sinus rhythm  Low voltage QRS  Possible Anterolateral infarct ,age undetermined  IVCD  When compared with ECG of 21-JAN-2022 14:59,  ND interval has decreased    Confirmed by YUMI CEVALLOS MD ([id number]) on 6/27/2022 4:16:18 PM    Referred By: LANDON   SELF           Confirmed By:YUMI CEVALLOS MD                            Imaging Results          X-Ray Chest 1 View (Final result)  Result time 06/27/22 13:47:00    Final result by Casper Muñoz MD (06/27/22 13:47:00)                 Impression:      See above      Electronically signed by: Casper Muñoz MD  Date:    06/27/2022  Time:    13:47             Narrative:    EXAMINATION:  XR CHEST 1 VIEW    CLINICAL HISTORY:  Left ventricular failure, unspecified    TECHNIQUE:  Single frontal view of the chest was performed.    COMPARISON:  Non 11/11/2021 e    FINDINGS:  Postoperative changes, LVAD and pacer device.  Cardiomegaly.  The lungs are clear.  No pleural effusion                                 Medications   furosemide (LASIX) 200 mg in dextrose 5 % 100 mL continuous infusion (conc: 2 mg/mL) (20 mg/hr Intravenous New Bag 6/27/22 2042)   allopurinol split tablet 100 mg (has no administration in time range)   ALPRAZolam tablet 1 mg (1 mg Oral Given 6/27/22 2121)   amiodarone tablet 400 mg (has no administration in time range)   amLODIPine tablet 10 mg (has no administration in time range)   busPIRone tablet 5 mg (5 mg Oral Given 6/27/22 2119)   aspirin EC tablet 81 mg (has no  administration in time range)   levothyroxine tablet 112 mcg (112 mcg Oral Given 6/28/22 0617)   mexiletine capsule 250 mg (250 mg Oral Given 6/28/22 0617)   pantoprazole EC tablet 40 mg (has no administration in time range)   warfarin (COUMADIN) tablet 5 mg (has no administration in time range)   zolpidem tablet 5 mg (5 mg Oral Given 6/28/22 0143)   sodium chloride 0.9% flush 10 mL (has no administration in time range)   remdesivir 200 mg in sodium chloride 0.9% 250 mL infusion (0 mg Intravenous Stopped 6/27/22 1818)     Followed by   remdesivir 100 mg in sodium chloride 0.9% 250 mL infusion (has no administration in time range)   warfarin (COUMADIN) tablet 2.5 mg (has no administration in time range)   warfarin (COUMADIN) tablet 2.5 mg (has no administration in time range)   warfarin (COUMADIN) tablet 5 mg (has no administration in time range)   warfarin (COUMADIN) tablet 5 mg (has no administration in time range)   mupirocin 2 % ointment ( Nasal Given 6/27/22 2122)   acetaminophen tablet 650 mg (650 mg Oral Given 6/27/22 2120)   ondansetron injection 4 mg (4 mg Intravenous Given 6/27/22 1132)   furosemide injection 80 mg (80 mg Intravenous Given 6/27/22 1132)   aspirin tablet 325 mg (325 mg Oral Given 6/27/22 1132)   amLODIPine tablet 10 mg (10 mg Oral Given 6/27/22 1429)   furosemide injection 80 mg (80 mg Intravenous Given 6/27/22 1543)     Medical Decision Making:   History:   Old Medical Records: I decided to obtain old medical records.  Initial Assessment:   Tachypneic, hypertensive, COVID negative at home (home past) and extensive history of heart failure with implanted LVAD 2018 presents with worsening shortness of breath bilateral chest pain and diaphoresis that has progressed gradual last 12-24 hours.  He reports increased salt consumption at a family barbecue yesterday.  Prior to yesterday is gathering, he denies any chest pain, chest pain on exertion, dyspnea, dyspnea on exertion or decreased to his  exercise tolerance.  ____________________  Reji Evangelista MD, FAAEM  Emergency Medicine Staff    Clinical Tests:   Lab Tests: Ordered  Radiological Study: Ordered  Medical Tests: Ordered                      Clinical Impression:   Final diagnoses:  [R06.02] SOB (shortness of breath)  [I10] Hypertension, unspecified type  [R09.89] JVD (jugular venous distension)  [R79.89] Elevated brain natriuretic peptide (BNP) level  [J81.0] Acute pulmonary edema  [I50.1] Pulmonary edema cardiac cause  [N17.9] WAGNER (acute kidney injury)  [R19.7] Diarrhea, unspecified type  [I50.43] Acute on chronic combined systolic and diastolic heart failure  [U07.1] COVID-19 virus infection (Primary)          ED Disposition Condition    Admit               Lavon Evangelista MD  06/28/22 0603

## 2022-06-28 LAB
ALBUMIN SERPL BCP-MCNC: 3.7 G/DL (ref 3.5–5.2)
ALP SERPL-CCNC: 115 U/L (ref 55–135)
ALT SERPL W/O P-5'-P-CCNC: 79 U/L (ref 10–44)
ANION GAP SERPL CALC-SCNC: 12 MMOL/L (ref 8–16)
ANION GAP SERPL CALC-SCNC: 12 MMOL/L (ref 8–16)
AST SERPL-CCNC: 108 U/L (ref 10–40)
BASOPHILS # BLD AUTO: 0.03 K/UL (ref 0–0.2)
BASOPHILS NFR BLD: 0.3 % (ref 0–1.9)
BILIRUB DIRECT SERPL-MCNC: 1.2 MG/DL (ref 0.1–0.3)
BILIRUB SERPL-MCNC: 1.7 MG/DL (ref 0.1–1)
BILIRUB UR QL STRIP: NEGATIVE
BLD GP AB SCN CELLS X3 SERPL QL: NORMAL
BSA FOR ECHO PROCEDURE: 2.39 M2
BUN SERPL-MCNC: 40 MG/DL (ref 6–20)
BUN SERPL-MCNC: 40 MG/DL (ref 6–20)
CALCIUM SERPL-MCNC: 9.5 MG/DL (ref 8.7–10.5)
CALCIUM SERPL-MCNC: 9.5 MG/DL (ref 8.7–10.5)
CHLORIDE SERPL-SCNC: 98 MMOL/L (ref 95–110)
CHLORIDE SERPL-SCNC: 98 MMOL/L (ref 95–110)
CLARITY UR REFRACT.AUTO: CLEAR
CO2 SERPL-SCNC: 25 MMOL/L (ref 23–29)
CO2 SERPL-SCNC: 25 MMOL/L (ref 23–29)
COLOR UR AUTO: YELLOW
CREAT SERPL-MCNC: 3 MG/DL (ref 0.5–1.4)
CREAT SERPL-MCNC: 3 MG/DL (ref 0.5–1.4)
CRP SERPL-MCNC: 141.9 MG/L (ref 0–8.2)
DAT IGG-SP REAG RBC-IMP: NORMAL
DIFFERENTIAL METHOD: ABNORMAL
EJECTION FRACTION: 10 %
EOSINOPHIL # BLD AUTO: 0 K/UL (ref 0–0.5)
EOSINOPHIL NFR BLD: 0.1 % (ref 0–8)
ERYTHROCYTE [DISTWIDTH] IN BLOOD BY AUTOMATED COUNT: 14.1 % (ref 11.5–14.5)
EST. GFR  (AFRICAN AMERICAN): 25.8 ML/MIN/1.73 M^2
EST. GFR  (AFRICAN AMERICAN): 25.8 ML/MIN/1.73 M^2
EST. GFR  (NON AFRICAN AMERICAN): 22.3 ML/MIN/1.73 M^2
EST. GFR  (NON AFRICAN AMERICAN): 22.3 ML/MIN/1.73 M^2
GLUCOSE SERPL-MCNC: 103 MG/DL (ref 70–110)
GLUCOSE SERPL-MCNC: 103 MG/DL (ref 70–110)
GLUCOSE UR QL STRIP: NEGATIVE
HCT VFR BLD AUTO: 37.3 % (ref 40–54)
HGB BLD-MCNC: 11.5 G/DL (ref 14–18)
HGB UR QL STRIP: ABNORMAL
HYALINE CASTS UR QL AUTO: 4 /LPF
IMM GRANULOCYTES # BLD AUTO: 0.03 K/UL (ref 0–0.04)
IMM GRANULOCYTES NFR BLD AUTO: 0.3 % (ref 0–0.5)
INR PPP: 3 (ref 0.8–1.2)
KETONES UR QL STRIP: NEGATIVE
LDH SERPL L TO P-CCNC: 1147 U/L (ref 110–260)
LEUKOCYTE ESTERASE UR QL STRIP: NEGATIVE
LYMPHOCYTES # BLD AUTO: 0.8 K/UL (ref 1–4.8)
LYMPHOCYTES NFR BLD: 8.5 % (ref 18–48)
MCH RBC QN AUTO: 26.8 PG (ref 27–31)
MCHC RBC AUTO-ENTMCNC: 30.8 G/DL (ref 32–36)
MCV RBC AUTO: 87 FL (ref 82–98)
MICROSCOPIC COMMENT: ABNORMAL
MONOCYTES # BLD AUTO: 1.3 K/UL (ref 0.3–1)
MONOCYTES NFR BLD: 14.2 % (ref 4–15)
NEUTROPHILS # BLD AUTO: 7 K/UL (ref 1.8–7.7)
NEUTROPHILS NFR BLD: 76.6 % (ref 38–73)
NITRITE UR QL STRIP: NEGATIVE
NRBC BLD-RTO: 0 /100 WBC
PH UR STRIP: 5 [PH] (ref 5–8)
PLATELET # BLD AUTO: 225 K/UL (ref 150–450)
PMV BLD AUTO: 11.4 FL (ref 9.2–12.9)
POTASSIUM SERPL-SCNC: 4.6 MMOL/L (ref 3.5–5.1)
POTASSIUM SERPL-SCNC: 4.6 MMOL/L (ref 3.5–5.1)
PROT SERPL-MCNC: 7.7 G/DL (ref 6–8.4)
PROT UR QL STRIP: NEGATIVE
PROTHROMBIN TIME: 29.2 SEC (ref 9–12.5)
RBC # BLD AUTO: 4.29 M/UL (ref 4.6–6.2)
RBC #/AREA URNS AUTO: 0 /HPF (ref 0–4)
RETICS/RBC NFR AUTO: 2.9 % (ref 0.4–2)
SODIUM SERPL-SCNC: 135 MMOL/L (ref 136–145)
SODIUM SERPL-SCNC: 135 MMOL/L (ref 136–145)
SP GR UR STRIP: 1.01 (ref 1–1.03)
SQUAMOUS #/AREA URNS AUTO: 0 /HPF
URN SPEC COLLECT METH UR: ABNORMAL
WBC # BLD AUTO: 9.14 K/UL (ref 3.9–12.7)
WBC #/AREA URNS AUTO: 2 /HPF (ref 0–5)

## 2022-06-28 PROCEDURE — 85045 AUTOMATED RETICULOCYTE COUNT: CPT | Performed by: STUDENT IN AN ORGANIZED HEALTH CARE EDUCATION/TRAINING PROGRAM

## 2022-06-28 PROCEDURE — 27000207 HC ISOLATION

## 2022-06-28 PROCEDURE — 80053 COMPREHEN METABOLIC PANEL: CPT | Performed by: STUDENT IN AN ORGANIZED HEALTH CARE EDUCATION/TRAINING PROGRAM

## 2022-06-28 PROCEDURE — 76937 US GUIDE VASCULAR ACCESS: CPT

## 2022-06-28 PROCEDURE — 86880 COOMBS TEST DIRECT: CPT | Performed by: STUDENT IN AN ORGANIZED HEALTH CARE EDUCATION/TRAINING PROGRAM

## 2022-06-28 PROCEDURE — 82248 BILIRUBIN DIRECT: CPT | Performed by: STUDENT IN AN ORGANIZED HEALTH CARE EDUCATION/TRAINING PROGRAM

## 2022-06-28 PROCEDURE — 20600001 HC STEP DOWN PRIVATE ROOM

## 2022-06-28 PROCEDURE — 93750 PR INTERROGATE VENT ASSIST DEV, IN PERSON, W PHYSICIAN ANALYSIS: ICD-10-PCS | Mod: ,,, | Performed by: INTERNAL MEDICINE

## 2022-06-28 PROCEDURE — 86140 C-REACTIVE PROTEIN: CPT | Performed by: STUDENT IN AN ORGANIZED HEALTH CARE EDUCATION/TRAINING PROGRAM

## 2022-06-28 PROCEDURE — 99900035 HC TECH TIME PER 15 MIN (STAT)

## 2022-06-28 PROCEDURE — 93750 INTERROGATION VAD IN PERSON: CPT | Mod: ,,, | Performed by: INTERNAL MEDICINE

## 2022-06-28 PROCEDURE — 25000003 PHARM REV CODE 250: Performed by: STUDENT IN AN ORGANIZED HEALTH CARE EDUCATION/TRAINING PROGRAM

## 2022-06-28 PROCEDURE — 36410 VNPNXR 3YR/> PHY/QHP DX/THER: CPT

## 2022-06-28 PROCEDURE — 63600175 PHARM REV CODE 636 W HCPCS: Mod: TB | Performed by: STUDENT IN AN ORGANIZED HEALTH CARE EDUCATION/TRAINING PROGRAM

## 2022-06-28 PROCEDURE — 27000221 HC OXYGEN, UP TO 24 HOURS

## 2022-06-28 PROCEDURE — 85610 PROTHROMBIN TIME: CPT | Performed by: STUDENT IN AN ORGANIZED HEALTH CARE EDUCATION/TRAINING PROGRAM

## 2022-06-28 PROCEDURE — 25000003 PHARM REV CODE 250: Performed by: INTERNAL MEDICINE

## 2022-06-28 PROCEDURE — 83615 LACTATE (LD) (LDH) ENZYME: CPT | Performed by: STUDENT IN AN ORGANIZED HEALTH CARE EDUCATION/TRAINING PROGRAM

## 2022-06-28 PROCEDURE — 85025 COMPLETE CBC W/AUTO DIFF WBC: CPT | Performed by: STUDENT IN AN ORGANIZED HEALTH CARE EDUCATION/TRAINING PROGRAM

## 2022-06-28 PROCEDURE — C1751 CATH, INF, PER/CENT/MIDLINE: HCPCS

## 2022-06-28 PROCEDURE — 99223 PR INITIAL HOSPITAL CARE,LEVL III: ICD-10-PCS | Mod: ,,, | Performed by: INTERNAL MEDICINE

## 2022-06-28 PROCEDURE — 81001 URINALYSIS AUTO W/SCOPE: CPT | Performed by: INTERNAL MEDICINE

## 2022-06-28 PROCEDURE — 86157 COLD AGGLUTININ TITER: CPT | Performed by: STUDENT IN AN ORGANIZED HEALTH CARE EDUCATION/TRAINING PROGRAM

## 2022-06-28 PROCEDURE — 99233 PR SUBSEQUENT HOSPITAL CARE,LEVL III: ICD-10-PCS | Mod: ,,, | Performed by: INTERNAL MEDICINE

## 2022-06-28 PROCEDURE — 94761 N-INVAS EAR/PLS OXIMETRY MLT: CPT

## 2022-06-28 PROCEDURE — 36415 COLL VENOUS BLD VENIPUNCTURE: CPT | Performed by: STUDENT IN AN ORGANIZED HEALTH CARE EDUCATION/TRAINING PROGRAM

## 2022-06-28 PROCEDURE — 27000248 HC VAD-ADDITIONAL DAY

## 2022-06-28 PROCEDURE — 99223 1ST HOSP IP/OBS HIGH 75: CPT | Mod: ,,, | Performed by: INTERNAL MEDICINE

## 2022-06-28 PROCEDURE — 86850 RBC ANTIBODY SCREEN: CPT | Performed by: STUDENT IN AN ORGANIZED HEALTH CARE EDUCATION/TRAINING PROGRAM

## 2022-06-28 PROCEDURE — 99233 SBSQ HOSP IP/OBS HIGH 50: CPT | Mod: ,,, | Performed by: INTERNAL MEDICINE

## 2022-06-28 RX ORDER — METOLAZONE 5 MG/1
5 TABLET ORAL ONCE
Status: COMPLETED | OUTPATIENT
Start: 2022-06-28 | End: 2022-06-28

## 2022-06-28 RX ORDER — FUROSEMIDE 10 MG/ML
80 INJECTION INTRAMUSCULAR; INTRAVENOUS ONCE
Status: COMPLETED | OUTPATIENT
Start: 2022-06-28 | End: 2022-06-28

## 2022-06-28 RX ADMIN — ASPIRIN 81 MG: 81 TABLET, COATED ORAL at 11:06

## 2022-06-28 RX ADMIN — AMLODIPINE BESYLATE 10 MG: 10 TABLET ORAL at 11:06

## 2022-06-28 RX ADMIN — METOLAZONE 5 MG: 5 TABLET ORAL at 08:06

## 2022-06-28 RX ADMIN — MEXILETINE HYDROCHLORIDE 250 MG: 250 CAPSULE ORAL at 09:06

## 2022-06-28 RX ADMIN — BUSPIRONE HYDROCHLORIDE 5 MG: 5 TABLET ORAL at 03:06

## 2022-06-28 RX ADMIN — MEXILETINE HYDROCHLORIDE 250 MG: 250 CAPSULE ORAL at 06:06

## 2022-06-28 RX ADMIN — FUROSEMIDE 80 MG: 10 INJECTION, SOLUTION INTRAMUSCULAR; INTRAVENOUS at 11:06

## 2022-06-28 RX ADMIN — MEXILETINE HYDROCHLORIDE 250 MG: 250 CAPSULE ORAL at 03:06

## 2022-06-28 RX ADMIN — FUROSEMIDE 40 MG/HR: 10 INJECTION, SOLUTION INTRAMUSCULAR; INTRAVENOUS at 07:06

## 2022-06-28 RX ADMIN — LEVOTHYROXINE SODIUM 112 MCG: 112 TABLET ORAL at 06:06

## 2022-06-28 RX ADMIN — MUPIROCIN: 20 OINTMENT TOPICAL at 08:06

## 2022-06-28 RX ADMIN — ZOLPIDEM TARTRATE 5 MG: 5 TABLET ORAL at 08:06

## 2022-06-28 RX ADMIN — FUROSEMIDE 20 MG/HR: 10 INJECTION, SOLUTION INTRAMUSCULAR; INTRAVENOUS at 07:06

## 2022-06-28 RX ADMIN — REMDESIVIR 100 MG: 100 INJECTION, POWDER, LYOPHILIZED, FOR SOLUTION INTRAVENOUS at 01:06

## 2022-06-28 RX ADMIN — ACETAMINOPHEN 650 MG: 325 TABLET ORAL at 08:06

## 2022-06-28 RX ADMIN — ALLOPURINOL 100 MG: 100 TABLET ORAL at 11:06

## 2022-06-28 RX ADMIN — ZOLPIDEM TARTRATE 5 MG: 5 TABLET ORAL at 01:06

## 2022-06-28 RX ADMIN — PANTOPRAZOLE SODIUM 40 MG: 40 TABLET, DELAYED RELEASE ORAL at 01:06

## 2022-06-28 RX ADMIN — BUSPIRONE HYDROCHLORIDE 5 MG: 5 TABLET ORAL at 08:06

## 2022-06-28 RX ADMIN — FUROSEMIDE 40 MG/HR: 10 INJECTION, SOLUTION INTRAMUSCULAR; INTRAVENOUS at 01:06

## 2022-06-28 RX ADMIN — AMIODARONE HYDROCHLORIDE 400 MG: 200 TABLET ORAL at 11:06

## 2022-06-28 RX ADMIN — BUSPIRONE HYDROCHLORIDE 5 MG: 5 TABLET ORAL at 11:06

## 2022-06-28 NOTE — PLAN OF CARE
Recommendations  1. Continue cardiac diet-fluid per MD   2. If po intake declines <50% add Optisource BID (indicated for optimization for protein and calorie intake)  3. RD following     Goals: Meet % EEN/EPN by RD f/u date  Nutrition Goal Status: progressing towards goal  Communication of RD Recs: other (POC)

## 2022-06-28 NOTE — PROGRESS NOTES
"   06/28/22 1800        VAD 03/08/18 1124 Left ventricular assist device HeartMate II   Placement Date/Time: 03/08/18 1124   Present Prior to Hospital Arrival?: Yes  Inserted by: MD  VAD Type: Left ventricular assist device  VAD Brand: HeartMate II   Site Location Abdomen right   Site Assessment Clean;Dry;Intact   Driveline Exit Site 1   Driveline Assessment Free of Kinks;Intact;Modular cable Clean and Locked   Dressing Status Clean;Dry;Intact   Dressing Intervention Sterile dressing change   Performed By Patient   Dressing Change Schedule   (Twice weekly)   Dressing Change Due 07/02/22   Driveline [place] in use [place] deng   Condition CDI   Date changed 06/28/22   LVAD dressing change completed by patient using sterile technique with daily kit. DLES is a "1" with no drainage noted on the drain sponge. Tolerated without any complication. No redness, or tenderness noted.       "

## 2022-06-28 NOTE — CONSULTS
NAOMY placed 20g, 8 cm Midline in right cephalic vein using u/s guidance.  Max dwell date 7/27/2022.  Lot # [id number].    MIDLINE inserted for long term PVA

## 2022-06-28 NOTE — SUBJECTIVE & OBJECTIVE
Past Medical History:   Diagnosis Date    Anticoagulant long-term use     CHF (congestive heart failure)     Dilated cardiomyopathy 1/10/2018    Disorder of kidney and ureter     CKD    Encounter for blood transfusion     Gout     HTN (hypertension)     Hx of psychiatric care     ICD (implantable cardioverter-defibrillator) infection 7/1/2020    Psychiatric problem     Thyroid disease     Ventricular tachycardia (paroxysmal)        Past Surgical History:   Procedure Laterality Date    CARDIAC CATHETERIZATION  Dec. 2012    CARDIAC DEFIBRILLATOR PLACEMENT Left     CRRT-D    COLONOSCOPY N/A 3/6/2018    Procedure: COLONOSCOPY;  Surgeon: Alonso Bone MD;  Location: [facility] ENDO (2ND FLR);  Service: Endoscopy;  Laterality: N/A;    COLONOSCOPY N/A 7/17/2019    Procedure: COLONOSCOPY;  Surgeon: Blane Valdez MD;  Location: [facility] ENDO (2ND FLR);  Service: Endoscopy;  Laterality: N/A;    COLONOSCOPY N/A 7/18/2019    Procedure: COLONOSCOPY;  Surgeon: Blane Valdez MD;  Location: [facility] ENDO (2ND FLR);  Service: Endoscopy;  Laterality: N/A;    ESOPHAGOGASTRODUODENOSCOPY N/A 7/17/2019    Procedure: EGD (ESOPHAGOGASTRODUODENOSCOPY);  Surgeon: Blane Valdez MD;  Location: [facility] (2ND FLR);  Service: Endoscopy;  Laterality: N/A;    ESOPHAGOGASTRODUODENOSCOPY N/A 7/18/2019    Procedure: EGD (ESOPHAGOGASTRODUODENOSCOPY);  Surgeon: Blane Valdez MD;  Location: [facility] ENDO (2ND FLR);  Service: Endoscopy;  Laterality: N/A;    NONINVASIVE CARDIAC ELECTROPHYSIOLOGY STUDY N/A 10/18/2019    Procedure: CARDIAC ELECTROPHYSIOLOGY STUDY, NONINVASIVE;  Surgeon: Raz Wagner MD;  Location: [facility] EP LAB;  Service: Cardiology;  Laterality: N/A;  VT, DFTs, MDT CRTD in situ, LVAD, LASHELL pizarro, 3098    REPLACEMENT OF IMPLANTABLE CARDIOVERTER-DEFIBRILLATOR (ICD) GENERATOR N/A 3/9/2020    Procedure: REPLACEMENT, ICD GENERATOR;  Surgeon: Harry Yun MD;  Location: [facility] EP LAB;  Service: Cardiology;  Laterality: N/A;  VT, ICD Gen Change and Lead Revision, MDT,  MAC, DM,3 Prep    REVISION OF IMPLANTABLE CARDIOVERTER-DEFIBRILLATOR (ICD) ELECTRODE LEAD PLACEMENT N/A 3/9/2020    Procedure: REVISION, INSERTION, ELECTRODE LEAD, ICD;  Surgeon: Harry Yun MD;  Location: [facility] EP LAB;  Service: Cardiology;  Laterality: N/A;  VT, ICD Gen Change and Lead Revision, MDT, MAC, DM,3 Prep    TREATMENT OF CARDIAC ARRHYTHMIA  10/18/2019    Procedure: Cardioversion or Defibrillation;  Surgeon: Raz Wagner MD;  Location: [facility] EP LAB;  Service: Cardiology;;       Review of patient's allergies indicates:   Allergen Reactions    Lisinopril Anaphylaxis    Hydralazine analogues      Chronic constipation, impotence, dizziness       Medications:  Medications Prior to Admission   Medication Sig    allopurinoL (ZYLOPRIM) 100 MG tablet TAKE 1 TABLET (100 MG) BY MOUTH NIGHTLY.    ALPRAZolam (XANAX) 1 MG tablet Take 1 tablet (1 mg total) by mouth daily as needed for Anxiety. Bid prn    amiodarone (PACERONE) 200 MG Tab Take 2 tablets (400 mg total) by mouth once daily.    amLODIPine (NORVASC) 10 MG tablet TAKE 1 TABLET BY MOUTH EVERY DAY    aspirin (ECOTRIN) 81 MG EC tablet Take 1 tablet (81 mg total) by mouth once daily.    busPIRone (BUSPAR) 5 MG Tab Take 1 tablet (5 mg total) by mouth 3 (three) times daily.    levothyroxine (SYNTHROID) 112 MCG tablet Take 1 tablet (112 mcg total) by mouth before breakfast.    mexiletine (MEXITIL) 250 MG Cap Take 1 capsule (250 mg total) by mouth every 8 (eight) hours.    pantoprazole (PROTONIX) 40 MG tablet TAKE 1 TABLET (40 MG TOTAL) BY MOUTH DAILY WITH LUNCH.    warfarin (COUMADIN) 5 MG tablet TAKE 7.5 MG ORALLY DAILY EVERY SUNDAY AND THURSDAY, TAKE 5 MG ORALLY DAILY ON OTHER DAYS    zolpidem (AMBIEN CR) 12.5 MG CR tablet Take 1 tablet (12.5 mg total) by mouth nightly as needed for Insomnia.     Antibiotics (From admission, onward)                Start     Stop Route Frequency Ordered    06/27/22 2100  mupirocin 2 % ointment         07/02 2059 Nasl 2  "times daily 22 1810          Antifungals (From admission, onward)                None          Antivirals (From admission, onward)          Stop Route Frequency     remdesivir 100 mg        "Followed by" Linked Group Details     0859 IV Daily             Immunization History   Administered Date(s) Administered    COVID-19, MRNA, LN-S, PF (MODERNA FULL 0.5 ML DOSE) 2021, 2021    COVID-19, MRNA, LN-S, PF (Pfizer) (Purple Cap) 2021    Hepatitis A / Hepatitis B 2018    Influenza 2014, 2019    Influenza - Quadrivalent - PF *Preferred* (6 months and older) 2015, 2016, 10/05/2017, 2021    Influenza - Trivalent - PF (PED) 2014    Pneumococcal Conjugate - 13 Valent 2014    Pneumococcal Polysaccharide - 23 Valent 10/01/2015, 2016    Tdap 2018       Family History       Problem Relation (Age of Onset)    Cancer Sister (54)    Coronary artery disease Father    Diabetes Father    Heart disease Father    Hypertension Father    No Known Problems Mother, Brother          Social History     Socioeconomic History    Marital status:     Number of children: 3   Occupational History     Employer: [employer]    Tobacco Use    Smoking status: Former Smoker     Packs/day: 1.00     Years: 31.00     Pack years: 31.00     Types: Cigarettes     Quit date: 2018     Years since quittin.4    Smokeless tobacco: Never Used    Tobacco comment: pt is quiting on his own - pt stated not qualified for program;  pt  quit on his own   Substance and Sexual Activity    Alcohol use: No     Alcohol/week: 0.0 standard drinks     Comment: quit    Drug use: No    Sexual activity: Yes     Partners: Female     Birth control/protection: None     Comment: 10/5/17  with same partner 7 years    Social History Narrative    Disabled     Review of Systems   Constitutional:  Negative for chills, fatigue and fever.   HENT:  Negative for ear pain, mouth " sores, nosebleeds, postnasal drip, rhinorrhea, sinus pressure, sore throat, tinnitus, trouble swallowing and voice change.    Eyes:  Negative for photophobia, pain, redness and visual disturbance.   Respiratory:  Positive for shortness of breath. Negative for apnea, cough, chest tightness and wheezing.    Cardiovascular:  Negative for chest pain, palpitations and leg swelling.   Gastrointestinal:  Negative for abdominal pain, blood in stool, constipation, diarrhea, nausea and vomiting.   Endocrine: Negative for cold intolerance, heat intolerance, polydipsia and polyuria.   Genitourinary:  Negative for decreased urine volume, difficulty urinating, dysuria, flank pain, frequency, genital sores, hematuria, penile discharge, penile pain, penile swelling, scrotal swelling, testicular pain and urgency.   Musculoskeletal:  Negative for arthralgias, back pain, joint swelling, myalgias and neck pain.   Skin:  Negative for color change and rash.   Allergic/Immunologic: Negative for environmental allergies and food allergies.   Neurological:  Negative for dizziness, seizures, syncope, weakness, light-headedness, numbness and headaches.   Hematological:  Negative for adenopathy. Does not bruise/bleed easily.   Psychiatric/Behavioral:  Negative for agitation, confusion, decreased concentration, hallucinations, self-injury, sleep disturbance and suicidal ideas. The patient is not nervous/anxious.    Objective:     Vital Signs (Most Recent):  Temp: 97.1 °F (36.2 °C) (06/28/22 1621)  Pulse: (!) 48 (06/28/22 1621)  Resp: 17 (06/28/22 1621)  BP: (!) 78/0 (06/28/22 1500)  SpO2: 98 % (06/28/22 1621)   Vital Signs (24h Range):  Temp:  [97.1 °F (36.2 °C)-98.4 °F (36.9 °C)] 97.1 °F (36.2 °C)  Pulse:  [48-94] 48  Resp:  [17-20] 17  SpO2:  [80 %-98 %] 98 %  BP: (78-90)/(0) 78/0     Weight: 111.1 kg (245 lb)  Body mass index is 32.32 kg/m².    Estimated Creatinine Clearance: 36.4 mL/min (A) (based on SCr of 3 mg/dL (H)).    Physical  Exam  Vitals and nursing note reviewed. Exam conducted with a chaperone present.   Constitutional:       Appearance: Normal appearance.   HENT:      Head: Normocephalic and atraumatic.   Eyes:      General: No scleral icterus.        Right eye: No discharge.         Left eye: No discharge.      Conjunctiva/sclera: Conjunctivae normal.   Cardiovascular:      Pulses: Normal pulses.      Comments: VAD Humming sounds  Pulmonary:      Effort: Pulmonary effort is normal. No respiratory distress.      Breath sounds: Normal breath sounds. No stridor. No wheezing or rhonchi.   Abdominal:      General: There is no distension.      Palpations: Abdomen is soft.      Tenderness: There is no abdominal tenderness.      Comments: DLES covered with clean dressing. No abdominal wall induration or erythema.   Musculoskeletal:         General: Normal range of motion.   Skin:     General: Skin is warm and dry.   Neurological:      General: No focal deficit present.      Mental Status: He is alert and oriented to person, place, and time.   Psychiatric:         Mood and Affect: Mood normal.         Behavior: Behavior normal.         Thought Content: Thought content normal.       Significant Labs: Blood Culture: No results for input(s): LABBLOO in the last 4320 hours.  BMP:   Recent Labs   Lab 06/27/22  1548 06/27/22  1942 06/28/22  0527     --  103  103   *  --  135*  135*   K 5.7*   < > 4.6  4.6     --  98  98   CO2 22*  --  25  25   BUN 34*  --  40*  40*   CREATININE 2.8*  --  3.0*  3.0*   CALCIUM 10.1  --  9.5  9.5   MG 2.2  --   --     < > = values in this interval not displayed.     CBC:   Recent Labs   Lab 06/27/22  1116 06/27/22  1125 06/28/22  0527   WBC 10.06  --  9.14   HGB 12.6*  --  11.5*   HCT 40.6 41 37.3*     --  225     Respiratory Culture: No results for input(s): GSRESP, RESPIRATORYC in the last 4320 hours.  Urine Culture: No results for input(s): LABURIN in the last 4320 hours.  Urine  Studies:   Recent Labs   Lab 06/28/22  1137   COLORU Yellow   APPEARANCEUA Clear   PHUR 5.0   SPECGRAV 1.010   PROTEINUA Negative   GLUCUA Negative   KETONESU Negative   BILIRUBINUA Negative   OCCULTUA 2+*   NITRITE Negative   LEUKOCYTESUR Negative   RBCUA 0   WBCUA 2   SQUAMEPITHEL 0   HYALINECASTS 4*     Wound Culture: No results for input(s): LABAERO in the last 4320 hours.    Significant Imaging: I have reviewed all pertinent imaging results/findings within the past 24 hours.

## 2022-06-28 NOTE — PROGRESS NOTES
06/28/2022  Casper Harris    Current provider:  Antonio Hadley Jr.,*    Device interrogation:  TXP LVAD INTERROGATIONS 6/28/2022 6/28/2022 6/27/2022 6/27/2022 6/27/2022 5/19/2022 3/17/2022   Type HeartMate II HeartMate II HeartMate II HeartMate II HeartMate II - -   Flow 6.3 5.8 6.3 5.9 5.6 - -   Speed 9800 9800 9800 9800 9800 - -   PI 2.6 2.8 3.2 3.3 2.9 - -   Power (Jackson) 6.9 6.7 7.0 6.8 6.4 - -   LSL - - - - - - -   Pulsatility No Pulse No Pulse No Pulse No Pulse No Pulse No Pulse No Pulse          Rounded on Tim Richards to ensure all mechanical assist device settings (IABP or VAD) were appropriate and all parameters were within limits.  I was able to ensure all back up equipment was present, the staff had no issues, and the Perfusion Department daily rounding was complete.      For implantable VADs: Interrogation of Ventricular assist device was performed with analysis of device parameters and review of device function. I have personally reviewed the interrogation findings and agree with findings as stated.     In emergency, the nursing units have been notified to contact the perfusion department either by:  Calling [id number] from 630am to 4pm Mon thru Fri, utilizing the On-Call Finder functionality of Epic and searching for Perfusion, or by contacting the hospital  from 4pm to 630am and on weekends and asking to speak with the perfusionist on call.    6:50 AM

## 2022-06-28 NOTE — PLAN OF CARE
Notified by RN about patient having 4/10 frontal headache. BP doppler is 78. Pt stating this is a typical headache for him. No other symptoms reported at this time. I ordered tylenol prn and asked RN to notify me if symptoms changed or worsened.

## 2022-06-28 NOTE — PLAN OF CARE
Pt educated on fall risk overnight,pt remained free from falls/trauma/injury. Denies chest pain, SOB, palpitations, dizziness. Plan of care reviewed with pt, all questions answered. 3L O2 via NC. Sat> 90%. LVAD numbers and Doppler WDL. Lasix gtts contd. Bed locked in lowest position, call bell within reach, no acute distress noted, will continue to monitor.

## 2022-06-28 NOTE — ASSESSMENT & PLAN NOTE
Afebrile and without leukocytosis. Oxygen saturation rate improved with oxygen supplementation and Remdesivir therapy.   · Continue Remdesivir for at least 5 days.   · If stable for discharge prior to 5 days then can discontinue Remdesivir at discharge.

## 2022-06-28 NOTE — PROGRESS NOTES
[facility] - Emergency Dept  Transplant Heart  Progress Note    Patient Name: Tim Richards  MRN: [id number]  Admission Date: 6/27/2022  Hospital Length of Stay: 1 days  Code Status: Full Code   Attending Physician: KOREY Hadley MD  Primary Care Physician: Deyanira Booth MD  Expected Discharge Date: 7/1/2022  Principal Problem:<principal problem not specified>    Subjective / Interval   States today he has been urinating more today (clear yellow urine)  Had Speed echo as below      Home meds:  No current facility-administered medications on file prior to encounter.     Current Outpatient Medications on File Prior to Encounter   Medication Sig Dispense Refill    allopurinoL (ZYLOPRIM) 100 MG tablet TAKE 1 TABLET (100 MG) BY MOUTH NIGHTLY. 90 tablet 3    ALPRAZolam (XANAX) 1 MG tablet Take 1 tablet (1 mg total) by mouth daily as needed for Anxiety. Bid prn 30 tablet 1    amiodarone (PACERONE) 200 MG Tab Take 2 tablets (400 mg total) by mouth once daily. 180 tablet 3    amLODIPine (NORVASC) 10 MG tablet TAKE 1 TABLET BY MOUTH EVERY DAY 90 tablet 3    aspirin (ECOTRIN) 81 MG EC tablet Take 1 tablet (81 mg total) by mouth once daily. 30 tablet 11    busPIRone (BUSPAR) 5 MG Tab Take 1 tablet (5 mg total) by mouth 3 (three) times daily. 90 tablet 1    levothyroxine (SYNTHROID) 112 MCG tablet Take 1 tablet (112 mcg total) by mouth before breakfast. 90 tablet 3    mexiletine (MEXITIL) 250 MG Cap Take 1 capsule (250 mg total) by mouth every 8 (eight) hours. 270 capsule 3    pantoprazole (PROTONIX) 40 MG tablet TAKE 1 TABLET (40 MG TOTAL) BY MOUTH DAILY WITH LUNCH. 90 tablet 3    warfarin (COUMADIN) 5 MG tablet TAKE 7.5 MG ORALLY DAILY EVERY SUNDAY AND THURSDAY, TAKE 5 MG ORALLY DAILY ON OTHER DAYS 135 tablet 3    zolpidem (AMBIEN CR) 12.5 MG CR tablet Take 1 tablet (12.5 mg total) by mouth nightly as needed for Insomnia. 30 tablet 5    [DISCONTINUED] ferrous gluconate (FERGON) 324 MG tablet TAKE 1 TABLET (324  "MG TOTAL) BY MOUTH WITH LUNCH. 90 tablet 3    [DISCONTINUED] sildenafiL (VIAGRA) 100 MG tablet Take 1 tablet (100 mg total) by mouth daily as needed for Erectile Dysfunction. 6 tablet 3    [DISCONTINUED] torsemide (DEMADEX) 20 MG Tab Take 1 tablet (20 mg total) by mouth once daily. 180 tablet 3               Objective     Vitals:    06/28/22 1200 06/28/22 1218 06/28/22 1300 06/28/22 1400   BP:       BP Location:       Patient Position:       Pulse: 68 66 86 64   Resp:  17     Temp:  97.4 °F (36.3 °C)     TempSrc:  Oral     SpO2:  95%     Weight:   111.1 kg (245 lb)    Height:   6' 1" (1.854 m)        I/O last 3 completed shifts:  In: 477 [P.O.:477]  Out: 900 [Urine:900]  I/O this shift:  In: 1040 [P.O.:1040]  Out: 1000 [Urine:1000]         Physical Examination: General appearance - alert, well appearing, and in no distress  Mental status - alert, oriented to person, place, and time  Neck - JVD up to angle of mandible  Chest - clear to auscultation, no wheezes, rales or rhonchi, symmetric air entry  Heart - normal rate, regular rhythm, VAD hum  Abdomen - soft, nontender, nondistended, no masses, 15cm liver  Neurological - alert, oriented, normal speech, no focal findings or movement disorder noted  Extremities - BLE edema 1+      Scheduled Meds:   allopurinoL  100 mg Oral Daily    amiodarone  400 mg Oral Daily    amLODIPine  10 mg Oral Daily    aspirin  81 mg Oral Daily    busPIRone  5 mg Oral TID    levothyroxine  112 mcg Oral Before breakfast    mexiletine  250 mg Oral Q8H    mupirocin   Nasal BID    pantoprazole  40 mg Oral Daily with lunch    remdesivir infusion  100 mg Intravenous Daily     Continuous Infusions:   furosemide (LASIX) 2 mg/mL continuous infusion (non-titrating) 40 mg/hr (06/28/22 1354)     PRN Meds:.acetaminophen, ALPRAZolam, sodium chloride 0.9%, zolpidem      Labs  BMP  Lab Results   Component Value Date     (L) 06/28/2022     (L) 06/28/2022    K 4.6 06/28/2022    K 4.6 " 06/28/2022    CL 98 06/28/2022    CL 98 06/28/2022    CO2 25 06/28/2022    CO2 25 06/28/2022    BUN 40 (H) 06/28/2022    BUN 40 (H) 06/28/2022    CREATININE 3.0 (H) 06/28/2022    CREATININE 3.0 (H) 06/28/2022    CALCIUM 9.5 06/28/2022    CALCIUM 9.5 06/28/2022    ANIONGAP 12 06/28/2022    ANIONGAP 12 06/28/2022    ESTGFRAFRICA 25.8 (A) 06/28/2022    ESTGFRAFRICA 25.8 (A) 06/28/2022    EGFRNONAA 22.3 (A) 06/28/2022    EGFRNONAA 22.3 (A) 06/28/2022       Lab Results   Component Value Date    WBC 9.14 06/28/2022    HGB 11.5 (L) 06/28/2022    HCT 37.3 (L) 06/28/2022    MCV 87 06/28/2022     06/28/2022         BNP  Recent Labs   Lab 06/27/22  1116   BNP 1,951*       Lab Results   Component Value Date    ALT 79 (H) 06/28/2022     (H) 06/28/2022    ALKPHOS 115 06/28/2022    BILITOT 1.7 (H) 06/28/2022       Lab Results   Component Value Date    CHOL 130 05/19/2022    CHOL 170 11/11/2021    CHOL 230 (H) 05/05/2021     Lab Results   Component Value Date    HDL 46 05/19/2022    HDL 55 11/11/2021    HDL 43 05/05/2021     Lab Results   Component Value Date    LDLCALC 54.8 (L) 05/19/2022    LDLCALC 93.2 11/11/2021    LDLCALC 143.2 05/05/2021     Lab Results   Component Value Date    TRIG 146 05/19/2022    TRIG 109 11/11/2021    TRIG 219 (H) 05/05/2021     Lab Results   Component Value Date    CHOLHDL 35.4 05/19/2022    CHOLHDL 32.4 11/11/2021    CHOLHDL 18.7 (L) 05/05/2021       Cardiac studies  Results for orders placed during the hospital encounter of 05/19/22    Echo Color Flow Doppler? Yes    Interpretation Summary  · There is an LVAD present. Base speed is 9800 RPMs. The pump type is a Heartmate III. The interventricular septum appears to bow into the right ventricle. The aortic valve does not open.  · The left ventricle is severely enlarged with severe eccentric hypertrophy and severely decreased systolic function.  · The estimated ejection fraction is 10%.  · There is severe left ventricular global  hypokinesis.  · There is abnormal septal wall motion.  · Grade I left ventricular diastolic dysfunction.  · Severe left atrial enlargement.  · Normal right ventricular size with mildly reduced right ventricular systolic function.  · Moderate right atrial enlargement.  · Mild-to-moderate mitral regurgitation.  · Mild tricuspid regurgitation.  · Intermediate central venous pressure (8 mmHg).  · The estimated PA systolic pressure is 26 mmHg.  · The ascending aorta is dilated.      Results for orders placed or performed during the hospital encounter of 06/27/22   EKG 12-lead    Collection Time: 06/27/22 10:45 AM    Narrative    Test Reason : SOB    Vent. Rate : 093 BPM     Atrial Rate : 093 BPM     P-R Int : 176 ms          QRS Dur : 104 ms      QT Int : 442 ms       P-R-T Axes : 000 140 -18 degrees     QTc Int : 549 ms    Normal sinus rhythm  Low voltage QRS  Possible Anterolateral infarct ,age undetermined  IVCD  When compared with ECG of 21-JAN-2022 14:59,  [place] interval has decreased    Confirmed by YUMI CEVALLOS MD ([id number]) on 6/27/2022 4:16:18 PM    Referred By: [email or web address]   SELF           Confirmed By:YUMI CEVALLOS MD       Echo 6/28/2022  · There is an LVAD present. Base speed is 9800 RPMs. The pump type is a Heartmate II. Ramp study. At 9800 rpm the mitral valve leaflets fail to coapt and septum bows slightly to the right with intermittent aortic valve opening with LVEDd 9.3 cm, severe MR and mild AR. At 33794 rpm the septum appears more midline and LVIDd measures 9 cm with slightly reduced mitral regurgitation (better leaflet coaptation) but slightly worse AR (moderate).  · The left ventricle is severely enlarged with severely decreased systolic function. The estimated ejection fraction is 10%.  · There is severe left ventricular global hypokinesis.  · Moderately reduced right ventricular systolic function.  · Left ventricular diastolic dysfunction.  · Severe mitral regurgitation.          Assessment and  Plan:   56-yo M with hx of Stage D HFrEF (NICMP, EF 10%, previously NYHA II), s/p  HM2 implanted in march/2018, SC ICD, VT on amiodarone and mexiletine and amiodarone induced hypothyroidism. Patient with volume overload (JVD up to angle of mandible, extremity edema and hepatomegaly, elevated BNP, low sodium and low hemoglobin. Overall warm and wet, will admit to ICU for lasix drip. Also found to have covid 19 so will treat with remdesivir initially as per id recs.    #ADHF  #NICMP  #LVAD HM2  -acutely decompensated HF, warm and wet with presence of HM2 LVAD   -Hypoxic, improves kwan  -Hypertensive (MAP 96mmHg)  and severely volume overloaded (BNP almost 2K, hyponatremic, pulm edema, hepatomelgaly)  -Minimal response to lasix iv once  -Will continue Amlodipine 10mg daily  -Will hold Warfarin as INR is 3 today  -Lasix 80mg iv bolus again this morning + gtt at 40mg/hr    -increased from 20 given low UO, if not appropriate, will need to add metolazone  -Strict I/O  -Speed echo at 9800 showed bowing to the right [place] [id number], IVS was at midline, MR decreased but worse AR  -Pending plasma free Hb and UA    TXP LVAD INTERROGATIONS 6/28/2022 6/28/2022 6/28/2022 6/28/2022 6/27/2022 6/27/2022 6/27/2022   Type HeartMate II HeartMate II HeartMate II HeartMate II HeartMate II HeartMate II HeartMate II   Flow 6.2 6.2 6.3 5.8 6.3 5.9 5.6   Speed 9790 9800 9800 9800 9800 9800 9800   PI 3.0 3.0 2.6 2.8 3.2 3.3 2.9   Power (Jackson) 7.0 7.0 6.9 6.7 7.0 6.8 6.4   LSL - - - - - - -   Pulsatility No Pulse No Pulse No Pulse No Pulse No Pulse No Pulse No Pulse     #WAGNER  -Cr 3.0 today  -FeUrea showed intrinsic but crs could be the initial trigger  -Will avoid nephrotoxic drugs     #Elevated LFTs  -AST>ALT, TB also elevated  -Likely from hepatic congestion, will f/u daily    #Elevated LDH  -Usually an elevated LDH is a red flag in a patient with LVAD, especially HM2 given the risk of pump thrombosis but PI, power and flow are  similar to prior clinic visits and covid-19 infection can increase LDH  -Power is actually slightly lower than prior visit clinics  -Will complete workup repeating LDH and obtaining haptoglobin, plasma free hemoglobin and ua (hemoglobin and rbc)    #Covid-19 infection  -Hypoxia could be explained by pulmonary edema  -Refers chills, nausea and soft stools  -ID consulted, recommended Remdesivir, ordered    #Hx of VT  -s/p SC ICD   -Will continue mexiletine 25mg q8hrs and amiodarone 400mg daily  -Keep K>4 and Mg>2    #Amiodarone induced hypothyroidism  -Will continue amiodarone given repeated episodes of VT  -Continue levothyroxine 112 mcg     #Anxiety  -Zolpidem night prn  -Alprazolam prn anxiety    Ari Yun MD  Cardiology, PGY-IV  Transplant Heart  [facility] - Emergency Dept

## 2022-06-28 NOTE — NURSING
Scheduled Furosemide gtt @20mg/hr had not been started since 1700 due to medication not being available. The drip now started by the night RN @ 2042. MD is aware.

## 2022-06-28 NOTE — PROGRESS NOTES
Admit Note     SW spoke with spouse to assess patients needs due to pt being in isolation due to covid.  Patient is a 56 y.o.  male, admitted Covid, SOD, CHF, LVAD 3/2018; ICD.     Patient admitted from ED on 6/27/2022 .  At this time, patient presents as sleeping per pt's spouse.  At this time, patients caregiver presents as alert and oriented x 4, pleasant, recall good, concentration/judgement good, average intelligence, calm, communicative, cooperative and asking and answering questions appropriately.      Household/Family Systems (as reported by patients caregiver)     Patient resides with patient's spouse, at   [address] [facility] [address].    Support system includes spouse and adult step son.   Patient does not have dependents that are need of being cared for.     Patients primary caregiver is Kelly Maurice, patients spouse, phone number [phone].  Confirmed patients contact information is [phone] (home);   Telephone Information:   Mobile [phone]   .    During admission, patient's caregiver plans to stay in patient's room.  Confirmed patient and patients caregivers do have access to reliable transportation.    Cognitive Status/Learning     Patients caregiver reports patients reading ability as college and states patient does not have difficulty with reading, writing, seeing, hearing, comprehension and learning. Pt's spouse advised that pt has been having some minor short term memory issues.  Patients caregiver reports patient learns best by reading.   Needed: No.   Highest education level: college    Vocation/Disability (as reported by patients caregiver)    Working for Income: No  If no, reason not working: Disability  Patient is disabled due to heart issues since 2020.  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  Pt's spouse reported that she works full time.    Adherence     Patients caregiver  reports patient has a high level of adherence to patients health care regimen.  Adherence counseling and education provided.  Patient's caregiver verbalizes understanding.    Substance Use    Patients caregiver reports patients substance usage as the following:    Tobacco: none, patient denies any use.  Alcohol: none, patient denies any use.  Illicit Drugs/Non-prescribed Medications: none, patient denies any use.  Patients caregiver states clear understanding of the potential impact of substance use.  Substance abstinence/cessation counseling, education and resources provided and reviewed.     Services Utilizing/ADLS (as reported by patients caregiver)    Infusion Service: Prior to admission, patient utilizing? no Pt has used [facility] in the past for IV abx.  Home Health: Prior to admission, patient utilizing? no Pt used [facility] in the past.  DME: Prior to admission, yes cane (does not use currently)  Pulmonary/Cardiac Rehab: Prior to admission, no  Dialysis:  Prior to admission, no  Transplant Specialty Pharmacy:  Prior to admission, no.    Prior to admission, patients caregiver reports patient was independent with ADLS and was driving.  Patients caregiver reports patient is able to care for self at this time..  Patients caregiver reports patient indicates a willingness to care for self once medically cleared to do so.    Insurance/Medications      Payer/Plan Subscr  Sex Relation Sub. Ins. ID Effective Group Num   1. GILSBAR - SMO* JESUS PHELPS* 1965 Male Self [id number]                                    PO    2. AETNA - AETNA* JESUS PHELPS* 1965 Male Self [id number]                                    [address]        Primary Insurance (for UNOS reporting): Private Insurance (Wife reported Aetna through [employer])  Secondary Insurance (for UNOS reporting): None     Wife stated that pt no longer has Gilsbar.     Patients caregiver reports  patient is able to obtain and afford medications at this time and at time of discharge.    Living Will/Healthcare Power of     Patients caregiver reports patient does not have a LW and/or HCPA.   provided education regarding LW and HCPA and the completion of forms.    Coping/Mental Health (as reported by patients caregiver)    Patient is coping adequately with the aid of  family members. Patients caregiver denies any mental health concerns at this time..     Discharge Planning (as reported by patients caregiver)    At time of discharge, patient plans to return to patient's home under the care of his wife (Kelly).  Patients spouse will transport patient.  Per rounds today, expected discharge date has not been medically determined at this time. Patients caretaker verbalizes understanding and is involved in treatment planning and discharge process.    Additional Concerns    Patient's caretaker denies additional needs and/or concerns at this time.  providing ongoing psychosocial support, education, resources and d/c planning as needed.  SW remains available. Patient's caregiver verbalizes understanding and agreement with information reviewed,  availability and how to access available resources as needed.

## 2022-06-28 NOTE — HPI
"A 56-year-old man with HFrEF-10%, s/p VAD HM2 (March 2018), ICD, VT on amiodarone who developed malaise, anorexia, SOB, dark urine, and soft stools 3-4 days prior to admission. He [place] [facility] [place] which time he tested positive for Covid-19 infection. He was admitted for further management and started on Remdesivir treatment protocol. Since admission, he feels significantly improved with bowel movements returning to normal and the shortness of breath subsiding.     Infectious Diseases consulted for "covid infection, acute. Patient with LVAD"    ID reconsulted 7/21 for "sepsis and consideration for fungemia". Since pt was last seen by ID, patient underwent AV repair, redo sternotomy, explant of old lvad HM2 with implantation of HM3, and placed on VA-ECMO, takeback 7/14 for mediastinal washout/hematoma, and washout, sternal closure, creation of moises-pericardium (gerotex membrane) and wound vac placement on 7/15. Decannulated ECMO on 7/19 with subsequent fever and hypotension. Pt was placed on broad spectrum abx. Blcx obtained from 7/20 ngtd. S/p bronch on 7/20 - cx with normal respiratory sachin.     ID reconsulted 8/5 for "On cefepime and flagyl day 10 for klebsiella. Spiking fever. Has LVAD. WBC trending up (also on steroid though). Tip culture from [facility] in process."    "

## 2022-06-28 NOTE — CONSULTS
"[facility] - Cardiology Stepdown  Infectious Disease  Consult Note    Patient Name: Tim Richards  MRN: [id number]  Admission Date: 6/27/2022  Hospital Length of Stay: 1 days  Attending Physician: Antonio Hadley Jr.,*  Primary Care Provider: Deyanira Booth MD     Isolation Status: Airborne and Contact and Droplet    Patient information was obtained from patient, spouse/SO, past medical records and ER records.      Inpatient consult to Infectious Diseases  Consult performed by: Roxie Garg MD  Consult ordered by: Ari Yun MD        Assessment/Plan:     COVID-19  Afebrile and without leukocytosis. Oxygen saturation rate improved with oxygen supplementation and Remdesivir therapy.   · Continue Remdesivir for at least 5 days.   · If stable for discharge prior to 5 days then can discontinue Remdesivir at discharge.       Thank you for your consult. I will sign off. Please contact us if you have any additional questions.    Roxie Garg MD  Infectious Disease  [facility] - Cardiology Stepdown    Subjective:     Principal Problem: <principal problem not specified>    HPI: A 56-year-old man with HFrEF-10%, s/p VAD HM2 (March 2018), ICD, VT on amiodarone who developed malaise, anorexia, SOB, dark urine, and soft stools 3-4 days prior to admission. He [place] [facility] [place] at which time he tested positive for Covid-19 infection. He was admitted for further management and started on Remdesivir treatment protocol. Since admission, he feels significantly improved with bowel movements returning to normal and the shortness of breath subsiding.     Infectious Diseases consulted for "covid infection, acute. Patient with LVAD"            Past Medical History:   Diagnosis Date    Anticoagulant long-term use     CHF (congestive heart failure)     Dilated cardiomyopathy 1/10/2018    Disorder of kidney and ureter     CKD    Encounter for blood transfusion     Gout     HTN (hypertension)     Hx " of psychiatric care     ICD (implantable cardioverter-defibrillator) infection 7/1/2020    Psychiatric problem     Thyroid disease     Ventricular tachycardia (paroxysmal)        Past Surgical History:   Procedure Laterality Date    CARDIAC CATHETERIZATION  Dec. 2012    CARDIAC DEFIBRILLATOR PLACEMENT Left     CRRT-D    COLONOSCOPY N/A 3/6/2018    Procedure: COLONOSCOPY;  Surgeon: Alonso Bone MD;  Location: [facility] ENDO (2ND FLR);  Service: Endoscopy;  Laterality: N/A;    COLONOSCOPY N/A 7/17/2019    Procedure: COLONOSCOPY;  Surgeon: Blane Valdez MD;  Location: [facility] ENDO (2ND FLR);  Service: Endoscopy;  Laterality: N/A;    COLONOSCOPY N/A 7/18/2019    Procedure: COLONOSCOPY;  Surgeon: Blane Valdez MD;  Location: [facility] ENDO (2ND FLR);  Service: Endoscopy;  Laterality: N/A;    ESOPHAGOGASTRODUODENOSCOPY N/A 7/17/2019    Procedure: EGD (ESOPHAGOGASTRODUODENOSCOPY);  Surgeon: Blane Valdez MD;  Location: [facility] ENDO (2ND FLR);  Service: Endoscopy;  Laterality: N/A;    ESOPHAGOGASTRODUODENOSCOPY N/A 7/18/2019    Procedure: EGD (ESOPHAGOGASTRODUODENOSCOPY);  Surgeon: Blane Valdez MD;  Location: [facility] ENDO (2ND FLR);  Service: Endoscopy;  Laterality: N/A;    NONINVASIVE CARDIAC ELECTROPHYSIOLOGY STUDY N/A 10/18/2019    Procedure: CARDIAC ELECTROPHYSIOLOGY STUDY, NONINVASIVE;  Surgeon: Raz Wagner MD;  Location: [facility] EP LAB;  Service: Cardiology;  Laterality: N/A;  VT, DFTs, MDT CRTD in situ, LVAD, LASHELL pizarro, 3098    REPLACEMENT OF IMPLANTABLE CARDIOVERTER-DEFIBRILLATOR (ICD) GENERATOR N/A 3/9/2020    Procedure: REPLACEMENT, ICD GENERATOR;  Surgeon: Harry Yun MD;  Location: [facility] EP LAB;  Service: Cardiology;  Laterality: N/A;  VT, ICD Gen Change and Lead Revision, MDT, MAC, DM,3 Prep    REVISION OF IMPLANTABLE CARDIOVERTER-DEFIBRILLATOR (ICD) ELECTRODE LEAD PLACEMENT N/A 3/9/2020    Procedure: REVISION, INSERTION, ELECTRODE LEAD, ICD;  Surgeon: Harry Yun MD;  Location: [facility] EP LAB;  Service:  "Cardiology;  Laterality: N/A;  VT, ICD Gen Change and Lead Revision, MDT, MAC, DM,3 Prep    TREATMENT OF CARDIAC ARRHYTHMIA  10/18/2019    Procedure: Cardioversion or Defibrillation;  Surgeon: Raz Wagner MD;  Location: [facility] EP LAB;  Service: Cardiology;;       Review of patient's allergies indicates:   Allergen Reactions    Lisinopril Anaphylaxis    Hydralazine analogues      Chronic constipation, impotence, dizziness       Medications:  Medications Prior to Admission   Medication Sig    allopurinoL (ZYLOPRIM) 100 MG tablet TAKE 1 TABLET (100 MG) BY MOUTH NIGHTLY.    ALPRAZolam (XANAX) 1 MG tablet Take 1 tablet (1 mg total) by mouth daily as needed for Anxiety. Bid prn    amiodarone (PACERONE) 200 MG Tab Take 2 tablets (400 mg total) by mouth once daily.    amLODIPine (NORVASC) 10 MG tablet TAKE 1 TABLET BY MOUTH EVERY DAY    aspirin (ECOTRIN) 81 MG EC tablet Take 1 tablet (81 mg total) by mouth once daily.    busPIRone (BUSPAR) 5 MG Tab Take 1 tablet (5 mg total) by mouth 3 (three) times daily.    levothyroxine (SYNTHROID) 112 MCG tablet Take 1 tablet (112 mcg total) by mouth before breakfast.    mexiletine (MEXITIL) 250 MG Cap Take 1 capsule (250 mg total) by mouth every 8 (eight) hours.    pantoprazole (PROTONIX) 40 MG tablet TAKE 1 TABLET (40 MG TOTAL) BY MOUTH DAILY WITH LUNCH.    warfarin (COUMADIN) 5 MG tablet TAKE 7.5 MG ORALLY DAILY EVERY SUNDAY AND THURSDAY, TAKE 5 MG ORALLY DAILY ON OTHER DAYS    zolpidem (AMBIEN CR) 12.5 MG CR tablet Take 1 tablet (12.5 mg total) by mouth nightly as needed for Insomnia.     Antibiotics (From admission, onward)                Start     Stop Route Frequency Ordered    06/27/22 2100  mupirocin 2 % ointment         07/02 2059 Nasl 2 times daily 06/27/22 1810          Antifungals (From admission, onward)                None          Antivirals (From admission, onward)          Stop Route Frequency     remdesivir 100 mg        "Followed by" Linked Group " Details     0859 IV Daily             Immunization History   Administered Date(s) Administered    COVID-19, MRNA, LN-S, PF (MODERNA FULL 0.5 ML DOSE) 2021, 2021    COVID-19, MRNA, LN-S, PF (Pfizer) (Purple Cap) 2021    Hepatitis A / Hepatitis B 2018    Influenza 2014, 2019    Influenza - Quadrivalent - PF *Preferred* (6 months and older) 2015, 2016, 10/05/2017, 2021    Influenza - Trivalent - PF (PED) 2014    Pneumococcal Conjugate - 13 Valent 2014    Pneumococcal Polysaccharide - 23 Valent 10/01/2015, 2016    Tdap 2018       Family History       Problem Relation (Age of Onset)    Cancer Sister (54)    Coronary artery disease Father    Diabetes Father    Heart disease Father    Hypertension Father    No Known Problems Mother, Brother          Social History     Socioeconomic History    Marital status:     Number of children: 3   Occupational History     Employer: [employer]    Tobacco Use    Smoking status: Former Smoker     Packs/day: 1.00     Years: 31.00     Pack years: 31.00     Types: Cigarettes     Quit date: 2018     Years since quittin.4    Smokeless tobacco: Never Used    Tobacco comment: pt is quiting on his own - pt stated not qualified for program;  pt  quit on his own   Substance and Sexual Activity    Alcohol use: No     Alcohol/week: 0.0 standard drinks     Comment: quit    Drug use: No    Sexual activity: Yes     Partners: Female     Birth control/protection: None     Comment: 10/5/17  with same partner 7 years    Social History Narrative    Disabled     Review of Systems   Constitutional:  Negative for chills, fatigue and fever.   HENT:  Negative for ear pain, mouth sores, nosebleeds, postnasal drip, rhinorrhea, sinus pressure, sore throat, tinnitus, trouble swallowing and voice change.    Eyes:  Negative for photophobia, pain, redness and visual disturbance.    Respiratory:  Positive for shortness of breath. Negative for apnea, cough, chest tightness and wheezing.    Cardiovascular:  Negative for chest pain, palpitations and leg swelling.   Gastrointestinal:  Negative for abdominal pain, blood in stool, constipation, diarrhea, nausea and vomiting.   Endocrine: Negative for cold intolerance, heat intolerance, polydipsia and polyuria.   Genitourinary:  Negative for decreased urine volume, difficulty urinating, dysuria, flank pain, frequency, genital sores, hematuria, penile discharge, penile pain, penile swelling, scrotal swelling, testicular pain and urgency.   Musculoskeletal:  Negative for arthralgias, back pain, joint swelling, myalgias and neck pain.   Skin:  Negative for color change and rash.   Allergic/Immunologic: Negative for environmental allergies and food allergies.   Neurological:  Negative for dizziness, seizures, syncope, weakness, light-headedness, numbness and headaches.   Hematological:  Negative for adenopathy. Does not bruise/bleed easily.   Psychiatric/Behavioral:  Negative for agitation, confusion, decreased concentration, hallucinations, self-injury, sleep disturbance and suicidal ideas. The patient is not nervous/anxious.    Objective:     Vital Signs (Most Recent):  Temp: 97.1 °F (36.2 °C) (06/28/22 1621)  Pulse: (!) 48 (06/28/22 1621)  Resp: 17 (06/28/22 1621)  BP: (!) 78/0 (06/28/22 1500)  SpO2: 98 % (06/28/22 1621)   Vital Signs (24h Range):  Temp:  [97.1 °F (36.2 °C)-98.4 °F (36.9 °C)] 97.1 °F (36.2 °C)  Pulse:  [48-94] 48  Resp:  [17-20] 17  SpO2:  [80 %-98 %] 98 %  BP: (78-90)/(0) 78/0     Weight: 111.1 kg (245 lb)  Body mass index is 32.32 kg/m².    Estimated Creatinine Clearance: 36.4 mL/min (A) (based on SCr of 3 mg/dL (H)).    Physical Exam  Vitals and nursing note reviewed. Exam conducted with a chaperone present.   Constitutional:       Appearance: Normal appearance.   HENT:      Head: Normocephalic and atraumatic.   Eyes:       General: No scleral icterus.        Right eye: No discharge.         Left eye: No discharge.      Conjunctiva/sclera: Conjunctivae normal.   Cardiovascular:      Pulses: Normal pulses.      Comments: VAD Humming sounds  Pulmonary:      Effort: Pulmonary effort is normal. No respiratory distress.      Breath sounds: Normal breath sounds. No stridor. No wheezing or rhonchi.   Abdominal:      General: There is no distension.      Palpations: Abdomen is soft.      Tenderness: There is no abdominal tenderness.      Comments: DLES covered with clean dressing. No abdominal wall induration or erythema.   Musculoskeletal:         General: Normal range of motion.   Skin:     General: Skin is warm and dry.   Neurological:      General: No focal deficit present.      Mental Status: He is alert and oriented to person, place, and time.   Psychiatric:         Mood and Affect: Mood normal.         Behavior: Behavior normal.         Thought Content: Thought content normal.       Significant Labs: Blood Culture: No results for input(s): LABBLOO in the last 4320 hours.  BMP:   Recent Labs   Lab 06/27/22  1548 06/27/22  1942 06/28/22  0527     --  103  103   *  --  135*  135*   K 5.7*   < > 4.6  4.6     --  98  98   CO2 22*  --  25  25   BUN 34*  --  40*  40*   CREATININE 2.8*  --  3.0*  3.0*   CALCIUM 10.1  --  9.5  9.5   MG 2.2  --   --     < > = values in this interval not displayed.     CBC:   Recent Labs   Lab 06/27/22  1116 06/27/22  1125 06/28/22  0527   WBC 10.06  --  9.14   HGB 12.6*  --  11.5*   HCT 40.6 41 37.3*     --  225     Respiratory Culture: No results for input(s): GSRESP, RESPIRATORYC in the last 4320 hours.  Urine Culture: No results for input(s): LABURIN in the last 4320 hours.  Urine Studies:   Recent Labs   Lab 06/28/22  1137   COLORU Yellow   APPEARANCEUA Clear   PHUR 5.0   SPECGRAV 1.010   PROTEINUA Negative   GLUCUA Negative   KETONESU Negative   BILIRUBINUA Negative    OCCULTUA 2+*   NITRITE Negative   LEUKOCYTESUR Negative   RBCUA 0   WBCUA 2   SQUAMEPITHEL 0   HYALINECASTS 4*     Wound Culture: No results for input(s): LABAERO in the last 4320 hours.    Significant Imaging: I have reviewed all pertinent imaging results/findings within the past 24 hours.

## 2022-06-28 NOTE — PROGRESS NOTES
"[facility] - Cardiology Stepdown  Adult Nutrition  Progress Note    SUMMARY       Recommendations  1. Continue cardiac diet-fluid per MD   2. If po intake declines <50% add Optisource BID (indicated for optimization for protein and calorie intake)  3. RD following    Goals: Meet % EEN/EPN by RD f/u date  Nutrition Goal Status: progressing towards goal  Communication of RD Recs: other (POC)    Assessment and Plan    Nutrition Problem  Increased protein needs     Related to (etiology):   Physiological demands    Signs and Symptoms (as evidenced by):   LVAD present     Interventions (treatment strategy):  Collaboration of nutrition care w/ other providers     Nutrition Diagnosis Status:   New     Reason for Assessment  Reason For Assessment: Requested in rounds   Diagnosis: other (COVID-19)  Relevant Medical History: CHF, gout, cardiomyopathy  Interdisciplinary Rounds: attended  General Information Comments: Pt COVID-19 positive since 6/27/22. Pt denies any issues chewing/swallowing at this time. Pt tolerating diet-50% meal consumption this morning for breakfast. Pt w/ no s/s of malnutrition at this time. RD will monitor and f/u with low sodium/fluid restriction education. LBM noted-6/27/22  Nutrition Discharge Planning: cardiac/low sodium diet    Nutrition Risk Screen    Nutrition Risk Screen: no indicators present    Anthropometrics    Temp: 97.4 °F (36.3 °C)  Height Method: Stated  Height: 6' 1" (185.4 cm)  Height (inches): 73 in  Weight Method: Standard Scale  Weight: 111.1 kg (245 lb)  Weight (lb): 245 lb  Ideal Body Weight (IBW), Male: 184 lb  % Ideal Body Weight, Male (lb): 133.15 %  BMI (Calculated): 32.3       Lab/Procedures/Meds    Pertinent Labs Reviewed: reviewed  Pertinent Labs Comments: Sodium 135, BUN 40, Cr 3.0, GFR 22.3,  Pertinent Medications Reviewed: reviewed  Pertinent Medications Comments: buspirone, lasix    Estimated/Assessed Needs    Weight Used For Calorie Calculations: 111.1 kg (244 lb " 14.9 oz)  Energy Calorie Requirements (kcal): 1999  Energy Need Method: [place]-St Jeor (PAL 1.0)  Protein Requirements: 133 g (1.2 g/kg)  Weight Used For Protein Calculations: 111.1 kg (244 lb 14.9 oz)   RDA Method (mL): 1999     Nutrition Prescription Ordered    Current Diet Order: Cardiac diet    Evaluation of Received Nutrient/Fluid Intake    I/O: -423 ml since admit  Energy Calories Required: not meeting needs  Protein Required: not meeting needs  Fluid Required: not meeting needs  Total Fluid Intake (mL/kg): 1 ml or fluid per MD  Tolerance: tolerating  % Intake of Estimated Energy Needs: 50%  % Meal Intake: 50%    Nutrition Risk    Level of Risk/Frequency of Follow-up: low ((1 x/week))     Monitor and Evaluation    Food and Nutrient Intake: energy intake, food and beverage intake  Food and Nutrient Adminstration: diet order  Knowledge/Beliefs/Attitudes: beliefs and attitudes, food and nutrition knowledge/skill  Physical Activity and Function: nutrition-related ADLs and IADLs, factors affecting access to physical activity  Anthropometric Measurements: height/length, weight, weight change, body mass index, growth pattern indices/percentile ranks  Biochemical Data, Medical Tests and Procedures: electrolyte and renal panel, gastrointestinal profile, glucose/endocrine profile, inflammatory profile, lipid profile  Nutrition-Focused Physical Findings: overall appearance, extremities, muscles and bones, head and eyes, skin     Nutrition Follow-Up    RD Follow-up?: Yes

## 2022-06-28 NOTE — PLAN OF CARE
Plan of care discussed with patient.  Patient ambulating independently, fall precautions in place. LVAD DP and numbers WNL, smooth LVAD hum. O2sats  >95% on room air, Remdesivir ABX administered. Patient has no complaints of chest pain or SOB. Discussed medications and care. Patient has no questions at this time. Will continue to monitor.

## 2022-06-28 NOTE — PROGRESS NOTES
Interdisciplinary Rounds Report:   Attended interdisciplinary rounds with the [facility]/CTS services including the LVAD Coordinators, social workers, cardiologists, surgeons,  PT/OT/Speech, dietician, and unit charge nurses. Discussed patient status, plan of care, goals of care, including DC date, and post discharge needs. Plan of care will be discussed with the patient and/or family per the physician while rounding on the floor. This is a recurring meeting that is medically and socially necessary to collaborate with the interdisciplinary team to assist patient needs and safe discharge.

## 2022-06-29 LAB
ALBUMIN SERPL BCP-MCNC: 3.6 G/DL (ref 3.5–5.2)
ALP SERPL-CCNC: 111 U/L (ref 55–135)
ALT SERPL W/O P-5'-P-CCNC: 100 U/L (ref 10–44)
ANION GAP SERPL CALC-SCNC: 13 MMOL/L (ref 8–16)
ANION GAP SERPL CALC-SCNC: 16 MMOL/L (ref 8–16)
AST SERPL-CCNC: 122 U/L (ref 10–40)
BASOPHILS # BLD AUTO: 0.02 K/UL (ref 0–0.2)
BASOPHILS NFR BLD: 0.2 % (ref 0–1.9)
BILIRUB SERPL-MCNC: 2.3 MG/DL (ref 0.1–1)
BNP SERPL-MCNC: 1292 PG/ML (ref 0–99)
BUN SERPL-MCNC: 39 MG/DL (ref 6–20)
BUN SERPL-MCNC: 40 MG/DL (ref 6–20)
CALCIUM SERPL-MCNC: 9.5 MG/DL (ref 8.7–10.5)
CALCIUM SERPL-MCNC: 9.6 MG/DL (ref 8.7–10.5)
CHLORIDE SERPL-SCNC: 91 MMOL/L (ref 95–110)
CHLORIDE SERPL-SCNC: 95 MMOL/L (ref 95–110)
CO2 SERPL-SCNC: 25 MMOL/L (ref 23–29)
CO2 SERPL-SCNC: 31 MMOL/L (ref 23–29)
CREAT SERPL-MCNC: 2.4 MG/DL (ref 0.5–1.4)
CREAT SERPL-MCNC: 2.5 MG/DL (ref 0.5–1.4)
CRP SERPL-MCNC: 186.8 MG/L (ref 0–8.2)
DIFFERENTIAL METHOD: ABNORMAL
EOSINOPHIL # BLD AUTO: 0 K/UL (ref 0–0.5)
EOSINOPHIL NFR BLD: 0.2 % (ref 0–8)
ERYTHROCYTE [DISTWIDTH] IN BLOOD BY AUTOMATED COUNT: 13.9 % (ref 11.5–14.5)
EST. GFR  (AFRICAN AMERICAN): 32.2 ML/MIN/1.73 M^2
EST. GFR  (AFRICAN AMERICAN): 33.8 ML/MIN/1.73 M^2
EST. GFR  (NON AFRICAN AMERICAN): 27.9 ML/MIN/1.73 M^2
EST. GFR  (NON AFRICAN AMERICAN): 29.3 ML/MIN/1.73 M^2
GLUCOSE SERPL-MCNC: 103 MG/DL (ref 70–110)
GLUCOSE SERPL-MCNC: 96 MG/DL (ref 70–110)
HCT VFR BLD AUTO: 35.9 % (ref 40–54)
HGB BLD-MCNC: 10.8 G/DL (ref 14–18)
HGB FREE PLAS-MCNC: 38.6 MG/DL (ref 0–15.2)
IMM GRANULOCYTES # BLD AUTO: 0.03 K/UL (ref 0–0.04)
IMM GRANULOCYTES NFR BLD AUTO: 0.3 % (ref 0–0.5)
INR PPP: 3.2 (ref 0.8–1.2)
LDH SERPL L TO P-CCNC: 1300 U/L (ref 110–260)
LYMPHOCYTES # BLD AUTO: 0.9 K/UL (ref 1–4.8)
LYMPHOCYTES NFR BLD: 10 % (ref 18–48)
MAGNESIUM SERPL-MCNC: 2 MG/DL (ref 1.6–2.6)
MCH RBC QN AUTO: 26.2 PG (ref 27–31)
MCHC RBC AUTO-ENTMCNC: 30.1 G/DL (ref 32–36)
MCV RBC AUTO: 87 FL (ref 82–98)
MONOCYTES # BLD AUTO: 1.5 K/UL (ref 0.3–1)
MONOCYTES NFR BLD: 16.4 % (ref 4–15)
NEUTROPHILS # BLD AUTO: 6.6 K/UL (ref 1.8–7.7)
NEUTROPHILS NFR BLD: 72.9 % (ref 38–73)
NRBC BLD-RTO: 0 /100 WBC
OXYHGB PLAS-MCNC: 29.7 MG/DL (ref 0–12.4)
PLATELET # BLD AUTO: 197 K/UL (ref 150–450)
PMV BLD AUTO: 10.9 FL (ref 9.2–12.9)
POTASSIUM SERPL-SCNC: 2.9 MMOL/L (ref 3.5–5.1)
POTASSIUM SERPL-SCNC: 3.2 MMOL/L (ref 3.5–5.1)
PROT SERPL-MCNC: 7.9 G/DL (ref 6–8.4)
PROTHROMBIN TIME: 31.7 SEC (ref 9–12.5)
RBC # BLD AUTO: 4.12 M/UL (ref 4.6–6.2)
SODIUM SERPL-SCNC: 135 MMOL/L (ref 136–145)
SODIUM SERPL-SCNC: 136 MMOL/L (ref 136–145)
WBC # BLD AUTO: 9.03 K/UL (ref 3.9–12.7)

## 2022-06-29 PROCEDURE — 97161 PT EVAL LOW COMPLEX 20 MIN: CPT

## 2022-06-29 PROCEDURE — 94761 N-INVAS EAR/PLS OXIMETRY MLT: CPT

## 2022-06-29 PROCEDURE — 25000003 PHARM REV CODE 250: Performed by: INTERNAL MEDICINE

## 2022-06-29 PROCEDURE — 83735 ASSAY OF MAGNESIUM: CPT | Performed by: INTERNAL MEDICINE

## 2022-06-29 PROCEDURE — 63600175 PHARM REV CODE 636 W HCPCS: Mod: TB | Performed by: STUDENT IN AN ORGANIZED HEALTH CARE EDUCATION/TRAINING PROGRAM

## 2022-06-29 PROCEDURE — 27000248 HC VAD-ADDITIONAL DAY

## 2022-06-29 PROCEDURE — 97535 SELF CARE MNGMENT TRAINING: CPT

## 2022-06-29 PROCEDURE — 99233 SBSQ HOSP IP/OBS HIGH 50: CPT | Mod: ,,, | Performed by: INTERNAL MEDICINE

## 2022-06-29 PROCEDURE — 27000221 HC OXYGEN, UP TO 24 HOURS

## 2022-06-29 PROCEDURE — 93750 INTERROGATION VAD IN PERSON: CPT | Mod: ,,, | Performed by: INTERNAL MEDICINE

## 2022-06-29 PROCEDURE — 85025 COMPLETE CBC W/AUTO DIFF WBC: CPT | Performed by: STUDENT IN AN ORGANIZED HEALTH CARE EDUCATION/TRAINING PROGRAM

## 2022-06-29 PROCEDURE — 99900035 HC TECH TIME PER 15 MIN (STAT)

## 2022-06-29 PROCEDURE — 97165 OT EVAL LOW COMPLEX 30 MIN: CPT

## 2022-06-29 PROCEDURE — 97116 GAIT TRAINING THERAPY: CPT

## 2022-06-29 PROCEDURE — 25000003 PHARM REV CODE 250: Performed by: STUDENT IN AN ORGANIZED HEALTH CARE EDUCATION/TRAINING PROGRAM

## 2022-06-29 PROCEDURE — 99233 PR SUBSEQUENT HOSPITAL CARE,LEVL III: ICD-10-PCS | Mod: ,,, | Performed by: INTERNAL MEDICINE

## 2022-06-29 PROCEDURE — 83880 ASSAY OF NATRIURETIC PEPTIDE: CPT | Performed by: STUDENT IN AN ORGANIZED HEALTH CARE EDUCATION/TRAINING PROGRAM

## 2022-06-29 PROCEDURE — 80053 COMPREHEN METABOLIC PANEL: CPT | Performed by: STUDENT IN AN ORGANIZED HEALTH CARE EDUCATION/TRAINING PROGRAM

## 2022-06-29 PROCEDURE — 85610 PROTHROMBIN TIME: CPT | Performed by: STUDENT IN AN ORGANIZED HEALTH CARE EDUCATION/TRAINING PROGRAM

## 2022-06-29 PROCEDURE — 20600001 HC STEP DOWN PRIVATE ROOM

## 2022-06-29 PROCEDURE — 94660 CPAP INITIATION&MGMT: CPT

## 2022-06-29 PROCEDURE — 83615 LACTATE (LD) (LDH) ENZYME: CPT | Performed by: STUDENT IN AN ORGANIZED HEALTH CARE EDUCATION/TRAINING PROGRAM

## 2022-06-29 PROCEDURE — 93750 PR INTERROGATE VENT ASSIST DEV, IN PERSON, W PHYSICIAN ANALYSIS: ICD-10-PCS | Mod: ,,, | Performed by: INTERNAL MEDICINE

## 2022-06-29 PROCEDURE — 80048 BASIC METABOLIC PNL TOTAL CA: CPT | Mod: XB | Performed by: INTERNAL MEDICINE

## 2022-06-29 PROCEDURE — 27000207 HC ISOLATION

## 2022-06-29 PROCEDURE — 86140 C-REACTIVE PROTEIN: CPT | Performed by: STUDENT IN AN ORGANIZED HEALTH CARE EDUCATION/TRAINING PROGRAM

## 2022-06-29 RX ORDER — POTASSIUM CHLORIDE 20 MEQ/1
40 TABLET, EXTENDED RELEASE ORAL ONCE
Status: COMPLETED | OUTPATIENT
Start: 2022-06-29 | End: 2022-06-29

## 2022-06-29 RX ORDER — DIPYRIDAMOLE 25 MG
75 TABLET ORAL 3 TIMES DAILY
Status: DISCONTINUED | OUTPATIENT
Start: 2022-06-29 | End: 2022-06-30

## 2022-06-29 RX ORDER — POTASSIUM CHLORIDE 20 MEQ/1
60 TABLET, EXTENDED RELEASE ORAL
Status: COMPLETED | OUTPATIENT
Start: 2022-06-29 | End: 2022-06-29

## 2022-06-29 RX ORDER — ASPIRIN 325 MG
325 TABLET, DELAYED RELEASE (ENTERIC COATED) ORAL DAILY
Status: DISCONTINUED | OUTPATIENT
Start: 2022-06-30 | End: 2022-07-13

## 2022-06-29 RX ADMIN — BUSPIRONE HYDROCHLORIDE 5 MG: 5 TABLET ORAL at 03:06

## 2022-06-29 RX ADMIN — FUROSEMIDE 40 MG/HR: 10 INJECTION, SOLUTION INTRAMUSCULAR; INTRAVENOUS at 06:06

## 2022-06-29 RX ADMIN — ALLOPURINOL 100 MG: 100 TABLET ORAL at 08:06

## 2022-06-29 RX ADMIN — MEXILETINE HYDROCHLORIDE 250 MG: 250 CAPSULE ORAL at 01:06

## 2022-06-29 RX ADMIN — MEXILETINE HYDROCHLORIDE 250 MG: 250 CAPSULE ORAL at 09:06

## 2022-06-29 RX ADMIN — DIPYRIDAMOLE 75 MG: 25 TABLET, FILM COATED ORAL at 09:06

## 2022-06-29 RX ADMIN — BUSPIRONE HYDROCHLORIDE 5 MG: 5 TABLET ORAL at 09:06

## 2022-06-29 RX ADMIN — BUSPIRONE HYDROCHLORIDE 5 MG: 5 TABLET ORAL at 08:06

## 2022-06-29 RX ADMIN — PANTOPRAZOLE SODIUM 40 MG: 40 TABLET, DELAYED RELEASE ORAL at 12:06

## 2022-06-29 RX ADMIN — DIPYRIDAMOLE 75 MG: 25 TABLET, FILM COATED ORAL at 05:06

## 2022-06-29 RX ADMIN — DIPYRIDAMOLE 75 MG: 25 TABLET, FILM COATED ORAL at 01:06

## 2022-06-29 RX ADMIN — FUROSEMIDE 40 MG/HR: 10 INJECTION, SOLUTION INTRAMUSCULAR; INTRAVENOUS at 05:06

## 2022-06-29 RX ADMIN — FUROSEMIDE 40 MG/HR: 10 INJECTION, SOLUTION INTRAMUSCULAR; INTRAVENOUS at 01:06

## 2022-06-29 RX ADMIN — MUPIROCIN: 20 OINTMENT TOPICAL at 08:06

## 2022-06-29 RX ADMIN — FUROSEMIDE 40 MG/HR: 10 INJECTION, SOLUTION INTRAMUSCULAR; INTRAVENOUS at 10:06

## 2022-06-29 RX ADMIN — MEXILETINE HYDROCHLORIDE 250 MG: 250 CAPSULE ORAL at 06:06

## 2022-06-29 RX ADMIN — AMIODARONE HYDROCHLORIDE 400 MG: 200 TABLET ORAL at 08:06

## 2022-06-29 RX ADMIN — AMLODIPINE BESYLATE 10 MG: 10 TABLET ORAL at 08:06

## 2022-06-29 RX ADMIN — POTASSIUM CHLORIDE 60 MEQ: 1500 TABLET, EXTENDED RELEASE ORAL at 10:06

## 2022-06-29 RX ADMIN — ZOLPIDEM TARTRATE 5 MG: 5 TABLET ORAL at 09:06

## 2022-06-29 RX ADMIN — POTASSIUM CHLORIDE 60 MEQ: 1500 TABLET, EXTENDED RELEASE ORAL at 09:06

## 2022-06-29 RX ADMIN — ASPIRIN 81 MG: 81 TABLET, COATED ORAL at 08:06

## 2022-06-29 RX ADMIN — LEVOTHYROXINE SODIUM 112 MCG: 112 TABLET ORAL at 06:06

## 2022-06-29 RX ADMIN — ACETAMINOPHEN 650 MG: 325 TABLET ORAL at 09:06

## 2022-06-29 RX ADMIN — POTASSIUM CHLORIDE 40 MEQ: 1500 TABLET, EXTENDED RELEASE ORAL at 06:06

## 2022-06-29 RX ADMIN — REMDESIVIR 100 MG: 100 INJECTION, POWDER, LYOPHILIZED, FOR SOLUTION INTRAVENOUS at 09:06

## 2022-06-29 RX ADMIN — POTASSIUM CHLORIDE 40 MEQ: 1500 TABLET, EXTENDED RELEASE ORAL at 08:06

## 2022-06-29 NOTE — PLAN OF CARE
Plan of care discussed with patient.  Patient ambulating independently, fall precautions in place. LVAD DP and numbers WNL, smooth LVAD hum. Lasix gtt @ 40mg/hr. Patient has no complaints of pain. Discussed medications and care. Patient has no questions at this time. Will continue to monitor.

## 2022-06-29 NOTE — PROGRESS NOTES
Notified MD. Yun pt wants to take dipyridamole at 1800 instead at 1500 d/t first dose given at 1300, and he c/o his gum disease getting worse and it bled some last night and little today, it's not continuous bleeding.

## 2022-06-29 NOTE — PLAN OF CARE
Problem: Physical Therapy  Goal: Physical Therapy Goal  Description: The patient is near his functional baseline, he ambulated within the room with no AD and independence. Unable to do stair training, assess gait endurance due to COVID restrictions. He is safe to return home with no therapy needs. He is in agreement with PT discharge, he states he is confident he can ascend/descend his stairs.   Outcome: Met   Omayra Williamson, PT   6/29/2022

## 2022-06-29 NOTE — PROGRESS NOTES
Notified MD. Hadley at bedside MAP 64, c/o when he walking he feel less stable than yesterday, but denies any dizziness and pt refused to take dipyridamole at 1500 because pt has question about lasix and dipyridamole. Pt educated call for help when feel dizziness or weak.

## 2022-06-29 NOTE — PLAN OF CARE
Plan of care discussed with pt. VSS. A&Ox4; independent. Denies c/o of CP or SOB. Telemetry monitor showing NSR. Lasix gtt infusing per MAR. LVAD dressing change due 7/1/22--no VAD alarms throughout shift. Covid precautions maintained. Pt remains free of injury or falls. All questions addressed.

## 2022-06-29 NOTE — PLAN OF CARE
LVAD number and doppler WNL. LVAD dressing CDI. Continue on lasix gtt 40 mg/hr. Pt educated on fall risk and remained free from falls/trauma/injury. Denies chest pain, SOB, palpitations, dizziness, pain, or discomfort. Plan of care reviewed with pt, all questions answered. Bed locked in lowest position, call bell within reach, no acute distress noted, will continue to monitor.

## 2022-06-29 NOTE — NURSING
Notified MD. Yun pt had 5 beats V tach, pt said he felt little palpitation, now is gone, and he has been having 4/10 HA since he has COVID, and now BP 90/47, map 66, doppler 76, HR 90s, sat O2 100% at 2 L NC K 3.2, got 40 mEq K tablet at 0658.

## 2022-06-29 NOTE — PT/OT/SLP EVAL
Occupational Therapy   Evaluation and Discharge Note    Name: Tim Richards  MRN: [id number]  Admitting Diagnosis:  <principal problem not specified>   Recent Surgery: * No surgery found *      Recommendations:     Discharge Recommendations: home  Discharge Equipment Recommendations:  none  Barriers to discharge:  None    Assessment:     Tim Richards is a 56 y.o. male with a medical diagnosis of <principal problem not specified>. At this time, patient is functioning at their prior level of function and does not require further acute OT services.     Plan:     During this hospitalization, patient does not require further acute OT services.  Please re-consult if situation changes.    · Plan of Care Reviewed with: patient    Subjective     Chief Complaint: COVID-19, old LVAD  Patient/Family Comments/goals: To get better.    Occupational Profile:  Living Environment: Pt lives in a 2 story house c 4 SALVADOR and B rails.  Has 15 steps on the inside of the house c rail on the L side of steps.  Previous level of function: I PTA  Equipment Used at home:  cane, quad  Assistance upon Discharge: Pt lives c his wife.    Pain/Comfort:  · Pain Rating 1: 0/10    Patients cultural, spiritual, Islam conflicts given the current situation: no    Objective:     Communicated with: RN prior to session.  Patient found supine with   upon OT entry to room.    General Precautions: Standard, fall, LVAD   Orthopedic Precautions:N/A   Braces: N/A  Respiratory Status: Nasal cannula, flow 2 L/min     Occupational Performance:    Bed Mobility:    · Patient completed Supine to Sit with independence  · Patient completed Sit to Supine with independence    Functional Mobility/Transfers:  · Patient completed Sit <> Stand Transfer with supervision  with  no assistive device   · Functional Mobility: Pt was able to walk to bathroom c mod I    Activities of Daily Living:  · Upper Body Dressing: modified independence to switch over to battery and  don hospital gown.  · Lower Body Dressing: modified independence to don/doff socks.    Cognitive/Visual Perceptual:  Cognitive/Psychosocial Skills:     -       Oriented to: Person, Place, Time and Situation   -       Follows Commands/attention:Follows multistep  commands    Physical Exam:  Upper Extremity Range of Motion:     -       Right Upper Extremity: WFL  -       Left Upper Extremity: WFL  Upper Extremity Strength:    -       Right Upper Extremity: WFL  -       Left Upper Extremity: WFL    AMPAC 6 Click ADL:  AMPAC Total Score: 24  Education:    Patient left supine with all lines intact, call button in reach and RN notified    GOALS:   Multidisciplinary Problems     Occupational Therapy Goals     Not on file          Multidisciplinary Problems (Resolved)        Problem: Occupational Therapy    Goal Priority Disciplines Outcome Interventions   Occupational Therapy Goal   (Resolved)     OT, PT/OT Met                    History:     Past Medical History:   Diagnosis Date    Anticoagulant long-term use     CHF (congestive heart failure)     Dilated cardiomyopathy 1/10/2018    Disorder of kidney and ureter     CKD    Encounter for blood transfusion     Gout     HTN (hypertension)     Hx of psychiatric care     ICD (implantable cardioverter-defibrillator) infection 7/1/2020    Psychiatric problem     Thyroid disease     Ventricular tachycardia (paroxysmal)        Past Surgical History:   Procedure Laterality Date    CARDIAC CATHETERIZATION  Dec. 2012    CARDIAC DEFIBRILLATOR PLACEMENT Left     CRRT-D    COLONOSCOPY N/A 3/6/2018    Procedure: COLONOSCOPY;  Surgeon: Alonso Bone MD;  Location: [facility] ([address] [place] [address]);  Service: Endoscopy;  Laterality: N/A;    COLONOSCOPY N/A 7/17/2019    Procedure: COLONOSCOPY;  Surgeon: Blane Valdez MD;  Location: [facility] ([address] [place] [address]);  Service: Endoscopy;  Laterality: N/A;    COLONOSCOPY N/A 7/18/2019    Procedure: COLONOSCOPY;  Surgeon: Blane Valdez MD;  Location: [facility]  ENDO (2ND FLR);  Service: Endoscopy;  Laterality: N/A;    ESOPHAGOGASTRODUODENOSCOPY N/A 7/17/2019    Procedure: EGD (ESOPHAGOGASTRODUODENOSCOPY);  Surgeon: Blane Valdez MD;  Location: [facility] ENDO (2ND FLR);  Service: Endoscopy;  Laterality: N/A;    ESOPHAGOGASTRODUODENOSCOPY N/A 7/18/2019    Procedure: EGD (ESOPHAGOGASTRODUODENOSCOPY);  Surgeon: Blane Valdez MD;  Location: [facility] ENDO (2ND FLR);  Service: Endoscopy;  Laterality: N/A;    NONINVASIVE CARDIAC ELECTROPHYSIOLOGY STUDY N/A 10/18/2019    Procedure: CARDIAC ELECTROPHYSIOLOGY STUDY, NONINVASIVE;  Surgeon: Raz Wagner MD;  Location: [facility] EP LAB;  Service: Cardiology;  Laterality: N/A;  VT, DFTs, MDT CRTD in situ, LVAD, anes, MB, 3098    REPLACEMENT OF IMPLANTABLE CARDIOVERTER-DEFIBRILLATOR (ICD) GENERATOR N/A 3/9/2020    Procedure: REPLACEMENT, ICD GENERATOR;  Surgeon: Harry Yun MD;  Location: [facility] EP LAB;  Service: Cardiology;  Laterality: N/A;  VT, ICD Gen Change and Lead Revision, MDT, MAC, DM,3 Prep    REVISION OF IMPLANTABLE CARDIOVERTER-DEFIBRILLATOR (ICD) ELECTRODE LEAD PLACEMENT N/A 3/9/2020    Procedure: REVISION, INSERTION, ELECTRODE LEAD, ICD;  Surgeon: Harry Yun MD;  Location: [facility] EP LAB;  Service: Cardiology;  Laterality: N/A;  VT, ICD Gen Change and Lead Revision, MDT, MAC, DM,3 Prep    TREATMENT OF CARDIAC ARRHYTHMIA  10/18/2019    Procedure: Cardioversion or Defibrillation;  Surgeon: Raz Wagner MD;  Location: [facility] EP LAB;  Service: Cardiology;;       Time Tracking:     OT Date of Treatment: 06/29/22  OT Start Time: 0923  OT Stop Time: 0945  OT Total Time (min): 22 min    Billable Minutes:Evaluation 11  Self Care/Home Management 11    6/29/2022

## 2022-06-29 NOTE — PROGRESS NOTES
06/29/2022  Casper Harrsi    Current provider:  Antonio Hadley Jr.,*    Device interrogation:  TXP LVAD INTERROGATIONS 6/29/2022 6/29/2022 6/28/2022 6/28/2022 6/28/2022 6/28/2022 6/28/2022   Type HeartMate II HeartMate II HeartMate II HeartMate II HeartMate II HeartMate II HeartMate II   Flow 6.4 6.7 6.3 6.6 6.2 6.2 6.3   Speed 9800 9800 9800 9800 9790 9800 9800   PI 2.8 3.2 3.1 3.0 3.0 3.0 2.6   Power (Jackson) 7.0 7.2 7.0 7.1 7.0 7.0 6.9   LSL - - - - - - -   Pulsatility - - - No Pulse No Pulse No Pulse No Pulse          Rounded on Tim Richards to ensure all mechanical assist device settings (IABP or VAD) were appropriate and all parameters were within limits.  I was able to ensure all back up equipment was present, the staff had no issues, and the Perfusion Department daily rounding was complete.      For implantable VADs: Interrogation of Ventricular assist device was performed with analysis of device parameters and review of device function. I have personally reviewed the interrogation findings and agree with findings as stated.     In emergency, the nursing units have been notified to contact the perfusion department either by:  Calling [phone] from 630am to 4pm Mon thru Fri, utilizing the On-Call Finder functionality of Epic and searching for Perfusion, or by contacting the hospital  from 4pm to 630am and on weekends and asking to speak with the perfusionist on call.    7:33 AM

## 2022-06-29 NOTE — CARE UPDATE
Patient UOP 1.6L today after increasing Lasix gtt from 20 mg/hr to 40 mg/hr. Fluid balance only net -800 mL today and still requiring 2-3 L NC. Will give Metolazone 5 mg x1 overnight for goal net -1L overnight. Relayed recs to nursing and will continue to monitor.    Sebas Adair MD PGY4  Cardiovascular Medicine Fellow  [facility]  Pager: [phone]

## 2022-06-29 NOTE — PT/OT/SLP EVAL
"Physical Therapy Evaluation and Discharge Note  Co-evaluation with OT due to acuity of condition, level of skilled assist needed for assessment of safety with mobility.   Patient Name:  Tim Richards   MRN:  [id number]    Recommendations:     Discharge Recommendations:  home   Discharge Equipment Recommendations: none   Barriers to discharge: None    Assessment:     Tim Richards is a 56 y.o. male admitted with a medical diagnosis of <principal problem not specified>. .  At this time, patient is functioning at their prior level of function and does not require further acute PT services.     Recent Surgery: * No surgery found *      Plan:     During this hospitalization, patient does not require further acute PT services.  Please re-consult if situation changes.      Subjective     Chief Complaint: "I get shakiness in my legs and numbness down the back of my legs when I first stand up. It's only been going on for like a month"  Patient/Family Comments/goals: return home  Pain/Comfort:  · Pain Rating 1: 0/10    Patients cultural, spiritual, Sabianism conflicts given the current situation: no    Living Environment:  The patient lives with his wife in a 2 SH (bed and bath upstairs), 4 SALVADOR no HR. WIS. Drives.   Prior to admission, patients level of function was independent .  Equipment used at home: cane, quad.  DME owned (not currently used): none.  Upon discharge, patient will have assistance from wife.    Objective:     Communicated with RN prior to session.  Patient found HOB elevated with telemetry, peripheral IV, oxygen, LVAD upon PT entry to room.    General Precautions: Standard, fall, LVAD, airborne, contact   Orthopedic Precautions:N/A   Braces: N/A   Respiratory Status: Nasal cannula, flow 2 L/min    Exams:    Cognitive Exam  Patient is A&O x4 and follows 100% of one -step commands    Fine Motor Coordination   -       WNL     Postural Exam Patient presented with the following abnormalities:    -       " Rounded shoulders  -       Forward head   Sensation    -       Light touch intact SHAHRAM LE   Skin Integrity/Edema     -       Skin integrity: visibly intact  -       Edema: NA   R LE ROM WNL   R LE Strength 5/5 hip flexion, knee ext/flex, and ankle DF/PF   L LE ROM WNL   L LE Strength  5/5 hip flexion, knee ext/flex, and ankle DF/PF       Balance   Static Sitting independent     Dynamic Sitting independent    Static Standing independent    Dynamic Standing       supervision assistance           Functional Mobility:    Bed Mobility  Supine to Sit on the L side:  independent   Sit to supine: independent    Transfers Sit to Stand:  independent     Initially upon standing, patient with episode of leg numbness, resolved <1 min standing    Gait  Gait Distance: 50 ft with no AD  Assistance Level: independent   Description: decreased gait speed, decreased step length, mild SOB- patient reported this is his baseline   Stairs NA due to COVID precautions          AM-PAC 6 CLICK MOBILITY  Total Score:24       Therapeutic Activities and Exercises:   Patient educated on role of therapy, goals of session, benefits of out of bed mobility. Patient agreeable to mobilize with therapy.  Discussed PT plan of care during hospitalization. Patient educated that they need to call for assistance to mobilize out of bed. Whiteboard updated as appropriate. Patient educated on how their diagnosis impacts their mobility within PT scope of practice.     Gait training: cued for waiting 2 min upon sitting and 2 min upon standing to monitor for symptoms due to transient leg tingling initially with position change, encouraged to alert medical team and RN notified    LVAD:   Patient found with LVAD on wall power  Performed transition from LVAD wall power > battery power with independence.   Patient left on battery power at end of therapy session   No LVAD alarms sounded during session    Patient educated on PT schedule.  Encouraged patient to ambulate,  sit up in chair 3x/day to prevent deconditioning during hospitalization. Patient verbalized understanding and agreement not to mobilize without RN assist. Patient in agreement with PT POC, home no therapy needs.    He is safe to ambulate independently, encouraged to continue ambulating within his room.       AM-PAC 6 CLICK MOBILITY  Total Score:24     Patient left HOB elevated with all lines intact, call button in reach and RN notified.    GOALS:   Multidisciplinary Problems     Physical Therapy Goals     Not on file          Multidisciplinary Problems (Resolved)        Problem: Physical Therapy    Goal Priority Disciplines Outcome Goal Variances Interventions   Physical Therapy Goal   (Resolved)     PT, PT/OT Met     Description: The patient is near his functional baseline, he ambulated within the room with no AD and independence. Unable to do stair training, assess gait endurance due to COVID restrictions. He is safe to return home with no therapy needs. He is in agreement with PT discharge, he states he is confident he can ascend/descend his stairs.                    History:     Past Medical History:   Diagnosis Date    Anticoagulant long-term use     CHF (congestive heart failure)     Dilated cardiomyopathy 1/10/2018    Disorder of kidney and ureter     CKD    Encounter for blood transfusion     Gout     HTN (hypertension)     Hx of psychiatric care     ICD (implantable cardioverter-defibrillator) infection 7/1/2020    Psychiatric problem     Thyroid disease     Ventricular tachycardia (paroxysmal)        Past Surgical History:   Procedure Laterality Date    CARDIAC CATHETERIZATION  Dec. 2012    CARDIAC DEFIBRILLATOR PLACEMENT Left     CRRT-D    COLONOSCOPY N/A 3/6/2018    Procedure: COLONOSCOPY;  Surgeon: Alonso Bone MD;  Location: [facility] ([address] [place] [address]);  Service: Endoscopy;  Laterality: N/A;    COLONOSCOPY N/A 7/17/2019    Procedure: COLONOSCOPY;  Surgeon: Blane Valdez MD;  Location: [facility]  (2ND FLR);  Service: Endoscopy;  Laterality: N/A;    COLONOSCOPY N/A 7/18/2019    Procedure: COLONOSCOPY;  Surgeon: Blane Valdez MD;  Location: [facility] ENDO (2ND FLR);  Service: Endoscopy;  Laterality: N/A;    ESOPHAGOGASTRODUODENOSCOPY N/A 7/17/2019    Procedure: EGD (ESOPHAGOGASTRODUODENOSCOPY);  Surgeon: Blane Valdez MD;  Location: [facility] ENDO (2ND FLR);  Service: Endoscopy;  Laterality: N/A;    ESOPHAGOGASTRODUODENOSCOPY N/A 7/18/2019    Procedure: EGD (ESOPHAGOGASTRODUODENOSCOPY);  Surgeon: Blane Valdez MD;  Location: [facility] ENDO (2ND FLR);  Service: Endoscopy;  Laterality: N/A;    NONINVASIVE CARDIAC ELECTROPHYSIOLOGY STUDY N/A 10/18/2019    Procedure: CARDIAC ELECTROPHYSIOLOGY STUDY, NONINVASIVE;  Surgeon: Raz Wagner MD;  Location: [facility] EP LAB;  Service: Cardiology;  Laterality: N/A;  VT, DFTs, MDT CRTD in situ, LVAD, anes, MB, 3098    REPLACEMENT OF IMPLANTABLE CARDIOVERTER-DEFIBRILLATOR (ICD) GENERATOR N/A 3/9/2020    Procedure: REPLACEMENT, ICD GENERATOR;  Surgeon: Harry Yun MD;  Location: [facility] EP LAB;  Service: Cardiology;  Laterality: N/A;  VT, ICD Gen Change and Lead Revision, MDT, MAC, DM,3 Prep    REVISION OF IMPLANTABLE CARDIOVERTER-DEFIBRILLATOR (ICD) ELECTRODE LEAD PLACEMENT N/A 3/9/2020    Procedure: REVISION, INSERTION, ELECTRODE LEAD, ICD;  Surgeon: Harry Yun MD;  Location: [facility] EP LAB;  Service: Cardiology;  Laterality: N/A;  VT, ICD Gen Change and Lead Revision, MDT, MAC, DM,3 Prep    TREATMENT OF CARDIAC ARRHYTHMIA  10/18/2019    Procedure: Cardioversion or Defibrillation;  Surgeon: Raz Wagner MD;  Location: [facility] EP LAB;  Service: Cardiology;;       Time Tracking:     PT Received On: 06/29/22  PT Start Time: 1023     PT Stop Time: 1045  PT Total Time (min): 22 min     Billable Minutes: Evaluation 11 and Gait Training 11      06/29/2022

## 2022-06-30 LAB
ALBUMIN SERPL BCP-MCNC: 3.5 G/DL (ref 3.5–5.2)
ALP SERPL-CCNC: 109 U/L (ref 55–135)
ALT SERPL W/O P-5'-P-CCNC: 93 U/L (ref 10–44)
ANION GAP SERPL CALC-SCNC: 11 MMOL/L (ref 8–16)
ANION GAP SERPL CALC-SCNC: 14 MMOL/L (ref 8–16)
APTT BLDCRRT: 36.4 SEC (ref 21–32)
APTT BLDCRRT: 40.8 SEC (ref 21–32)
AST SERPL-CCNC: 111 U/L (ref 10–40)
BASOPHILS # BLD AUTO: 0.02 K/UL (ref 0–0.2)
BASOPHILS NFR BLD: 0.2 % (ref 0–1.9)
BILIRUB SERPL-MCNC: 2.9 MG/DL (ref 0.1–1)
BUN SERPL-MCNC: 42 MG/DL (ref 6–20)
BUN SERPL-MCNC: 48 MG/DL (ref 6–20)
CA TITR SERPL: <2 TITER
CALCIUM SERPL-MCNC: 10 MG/DL (ref 8.7–10.5)
CALCIUM SERPL-MCNC: 9.9 MG/DL (ref 8.7–10.5)
CHLORIDE SERPL-SCNC: 91 MMOL/L (ref 95–110)
CHLORIDE SERPL-SCNC: 92 MMOL/L (ref 95–110)
CO2 SERPL-SCNC: 31 MMOL/L (ref 23–29)
CO2 SERPL-SCNC: 32 MMOL/L (ref 23–29)
CREAT SERPL-MCNC: 2.4 MG/DL (ref 0.5–1.4)
CREAT SERPL-MCNC: 2.8 MG/DL (ref 0.5–1.4)
CRP SERPL-MCNC: 186.1 MG/L (ref 0–8.2)
DIFFERENTIAL METHOD: ABNORMAL
EOSINOPHIL # BLD AUTO: 0 K/UL (ref 0–0.5)
EOSINOPHIL NFR BLD: 0.2 % (ref 0–8)
ERYTHROCYTE [DISTWIDTH] IN BLOOD BY AUTOMATED COUNT: 14.2 % (ref 11.5–14.5)
EST. GFR  (AFRICAN AMERICAN): 28.1 ML/MIN/1.73 M^2
EST. GFR  (AFRICAN AMERICAN): 33.8 ML/MIN/1.73 M^2
EST. GFR  (NON AFRICAN AMERICAN): 24.3 ML/MIN/1.73 M^2
EST. GFR  (NON AFRICAN AMERICAN): 29.3 ML/MIN/1.73 M^2
GLUCOSE SERPL-MCNC: 128 MG/DL (ref 70–110)
GLUCOSE SERPL-MCNC: 95 MG/DL (ref 70–110)
HCT VFR BLD AUTO: 34.9 % (ref 40–54)
HGB BLD-MCNC: 11.2 G/DL (ref 14–18)
IMM GRANULOCYTES # BLD AUTO: 0.02 K/UL (ref 0–0.04)
IMM GRANULOCYTES NFR BLD AUTO: 0.2 % (ref 0–0.5)
INR PPP: 2 (ref 0.8–1.2)
LDH SERPL L TO P-CCNC: 1501 U/L (ref 110–260)
LYMPHOCYTES # BLD AUTO: 0.8 K/UL (ref 1–4.8)
LYMPHOCYTES NFR BLD: 9.6 % (ref 18–48)
MAGNESIUM SERPL-MCNC: 2 MG/DL (ref 1.6–2.6)
MCH RBC QN AUTO: 26.7 PG (ref 27–31)
MCHC RBC AUTO-ENTMCNC: 32.1 G/DL (ref 32–36)
MCV RBC AUTO: 83 FL (ref 82–98)
MONOCYTES # BLD AUTO: 1.5 K/UL (ref 0.3–1)
MONOCYTES NFR BLD: 16.8 % (ref 4–15)
NEUTROPHILS # BLD AUTO: 6.3 K/UL (ref 1.8–7.7)
NEUTROPHILS NFR BLD: 73 % (ref 38–73)
NRBC BLD-RTO: 0 /100 WBC
PLATELET # BLD AUTO: 202 K/UL (ref 150–450)
PMV BLD AUTO: 10.7 FL (ref 9.2–12.9)
POTASSIUM SERPL-SCNC: 3.1 MMOL/L (ref 3.5–5.1)
POTASSIUM SERPL-SCNC: 3.3 MMOL/L (ref 3.5–5.1)
PROT SERPL-MCNC: 8.2 G/DL (ref 6–8.4)
PROTHROMBIN TIME: 19.8 SEC (ref 9–12.5)
RBC # BLD AUTO: 4.2 M/UL (ref 4.6–6.2)
SODIUM SERPL-SCNC: 135 MMOL/L (ref 136–145)
SODIUM SERPL-SCNC: 136 MMOL/L (ref 136–145)
WBC # BLD AUTO: 8.62 K/UL (ref 3.9–12.7)

## 2022-06-30 PROCEDURE — 80053 COMPREHEN METABOLIC PANEL: CPT | Performed by: STUDENT IN AN ORGANIZED HEALTH CARE EDUCATION/TRAINING PROGRAM

## 2022-06-30 PROCEDURE — 83735 ASSAY OF MAGNESIUM: CPT | Performed by: STUDENT IN AN ORGANIZED HEALTH CARE EDUCATION/TRAINING PROGRAM

## 2022-06-30 PROCEDURE — 63600175 PHARM REV CODE 636 W HCPCS: Performed by: INTERNAL MEDICINE

## 2022-06-30 PROCEDURE — 85025 COMPLETE CBC W/AUTO DIFF WBC: CPT | Performed by: STUDENT IN AN ORGANIZED HEALTH CARE EDUCATION/TRAINING PROGRAM

## 2022-06-30 PROCEDURE — 93005 ELECTROCARDIOGRAM TRACING: CPT

## 2022-06-30 PROCEDURE — 63600175 PHARM REV CODE 636 W HCPCS: Performed by: STUDENT IN AN ORGANIZED HEALTH CARE EDUCATION/TRAINING PROGRAM

## 2022-06-30 PROCEDURE — 85610 PROTHROMBIN TIME: CPT | Performed by: STUDENT IN AN ORGANIZED HEALTH CARE EDUCATION/TRAINING PROGRAM

## 2022-06-30 PROCEDURE — 85730 THROMBOPLASTIN TIME PARTIAL: CPT | Performed by: INTERNAL MEDICINE

## 2022-06-30 PROCEDURE — 99233 SBSQ HOSP IP/OBS HIGH 50: CPT | Mod: ,,, | Performed by: INTERNAL MEDICINE

## 2022-06-30 PROCEDURE — 86140 C-REACTIVE PROTEIN: CPT | Performed by: STUDENT IN AN ORGANIZED HEALTH CARE EDUCATION/TRAINING PROGRAM

## 2022-06-30 PROCEDURE — 83615 LACTATE (LD) (LDH) ENZYME: CPT | Performed by: STUDENT IN AN ORGANIZED HEALTH CARE EDUCATION/TRAINING PROGRAM

## 2022-06-30 PROCEDURE — 25000003 PHARM REV CODE 250: Performed by: STUDENT IN AN ORGANIZED HEALTH CARE EDUCATION/TRAINING PROGRAM

## 2022-06-30 PROCEDURE — 20600001 HC STEP DOWN PRIVATE ROOM

## 2022-06-30 PROCEDURE — 80048 BASIC METABOLIC PNL TOTAL CA: CPT | Mod: XB | Performed by: INTERNAL MEDICINE

## 2022-06-30 PROCEDURE — 25000003 PHARM REV CODE 250: Performed by: INTERNAL MEDICINE

## 2022-06-30 PROCEDURE — 99900035 HC TECH TIME PER 15 MIN (STAT)

## 2022-06-30 PROCEDURE — 27000207 HC ISOLATION

## 2022-06-30 PROCEDURE — 99233 PR SUBSEQUENT HOSPITAL CARE,LEVL III: ICD-10-PCS | Mod: ,,, | Performed by: INTERNAL MEDICINE

## 2022-06-30 PROCEDURE — 36415 COLL VENOUS BLD VENIPUNCTURE: CPT | Performed by: INTERNAL MEDICINE

## 2022-06-30 PROCEDURE — 93010 ELECTROCARDIOGRAM REPORT: CPT | Mod: ,,, | Performed by: INTERNAL MEDICINE

## 2022-06-30 PROCEDURE — 94760 N-INVAS EAR/PLS OXIMETRY 1: CPT

## 2022-06-30 PROCEDURE — 27000248 HC VAD-ADDITIONAL DAY

## 2022-06-30 PROCEDURE — 93750 INTERROGATION VAD IN PERSON: CPT | Mod: ,,, | Performed by: INTERNAL MEDICINE

## 2022-06-30 PROCEDURE — 93010 EKG 12-LEAD: ICD-10-PCS | Mod: ,,, | Performed by: INTERNAL MEDICINE

## 2022-06-30 PROCEDURE — 93750 PR INTERROGATE VENT ASSIST DEV, IN PERSON, W PHYSICIAN ANALYSIS: ICD-10-PCS | Mod: ,,, | Performed by: INTERNAL MEDICINE

## 2022-06-30 RX ORDER — POTASSIUM CHLORIDE 7.45 MG/ML
10 INJECTION INTRAVENOUS
Status: DISCONTINUED | OUTPATIENT
Start: 2022-06-30 | End: 2022-06-30

## 2022-06-30 RX ORDER — HEPARIN SODIUM,PORCINE/D5W 25000/250
0-40 INTRAVENOUS SOLUTION INTRAVENOUS CONTINUOUS
Status: DISCONTINUED | OUTPATIENT
Start: 2022-06-30 | End: 2022-07-13

## 2022-06-30 RX ORDER — POTASSIUM CHLORIDE 20 MEQ/1
20 TABLET, EXTENDED RELEASE ORAL
Status: COMPLETED | OUTPATIENT
Start: 2022-06-30 | End: 2022-06-30

## 2022-06-30 RX ORDER — POTASSIUM CHLORIDE 20 MEQ/1
40 TABLET, EXTENDED RELEASE ORAL ONCE
Status: COMPLETED | OUTPATIENT
Start: 2022-06-30 | End: 2022-06-30

## 2022-06-30 RX ORDER — EPTIFIBATIDE 0.75 MG/ML
2 INJECTION, SOLUTION INTRAVENOUS CONTINUOUS
Status: DISCONTINUED | OUTPATIENT
Start: 2022-06-30 | End: 2022-07-12

## 2022-06-30 RX ORDER — POTASSIUM CHLORIDE 20 MEQ/1
20 TABLET, EXTENDED RELEASE ORAL
Status: DISCONTINUED | OUTPATIENT
Start: 2022-06-30 | End: 2022-06-30

## 2022-06-30 RX ORDER — POTASSIUM CHLORIDE 750 MG/1
50 CAPSULE, EXTENDED RELEASE ORAL ONCE
Status: COMPLETED | OUTPATIENT
Start: 2022-06-30 | End: 2022-06-30

## 2022-06-30 RX ORDER — POTASSIUM CHLORIDE 750 MG/1
50 CAPSULE, EXTENDED RELEASE ORAL ONCE
Status: CANCELLED | OUTPATIENT
Start: 2022-06-30 | End: 2022-06-30

## 2022-06-30 RX ADMIN — FUROSEMIDE 40 MG/HR: 10 INJECTION, SOLUTION INTRAMUSCULAR; INTRAVENOUS at 03:06

## 2022-06-30 RX ADMIN — POTASSIUM CHLORIDE 40 MEQ: 1500 TABLET, EXTENDED RELEASE ORAL at 08:06

## 2022-06-30 RX ADMIN — MUPIROCIN: 20 OINTMENT TOPICAL at 09:06

## 2022-06-30 RX ADMIN — EPTIFIBATIDE 1 MCG/KG/MIN: 0.75 INJECTION INTRAVENOUS at 10:06

## 2022-06-30 RX ADMIN — MEXILETINE HYDROCHLORIDE 250 MG: 250 CAPSULE ORAL at 06:06

## 2022-06-30 RX ADMIN — ALLOPURINOL 100 MG: 100 TABLET ORAL at 08:06

## 2022-06-30 RX ADMIN — MEXILETINE HYDROCHLORIDE 250 MG: 250 CAPSULE ORAL at 01:06

## 2022-06-30 RX ADMIN — EPTIFIBATIDE 1 MCG/KG/MIN: 0.75 INJECTION INTRAVENOUS at 11:06

## 2022-06-30 RX ADMIN — BUSPIRONE HYDROCHLORIDE 5 MG: 5 TABLET ORAL at 09:06

## 2022-06-30 RX ADMIN — AMLODIPINE BESYLATE 10 MG: 10 TABLET ORAL at 08:06

## 2022-06-30 RX ADMIN — PANTOPRAZOLE SODIUM 40 MG: 40 TABLET, DELAYED RELEASE ORAL at 12:06

## 2022-06-30 RX ADMIN — LEVOTHYROXINE SODIUM 112 MCG: 112 TABLET ORAL at 06:06

## 2022-06-30 RX ADMIN — POTASSIUM CHLORIDE 20 MEQ: 1500 TABLET, EXTENDED RELEASE ORAL at 01:06

## 2022-06-30 RX ADMIN — ASPIRIN 325 MG: 325 TABLET, COATED ORAL at 08:06

## 2022-06-30 RX ADMIN — POTASSIUM CHLORIDE 20 MEQ: 1500 TABLET, EXTENDED RELEASE ORAL at 12:06

## 2022-06-30 RX ADMIN — MEXILETINE HYDROCHLORIDE 250 MG: 250 CAPSULE ORAL at 10:06

## 2022-06-30 RX ADMIN — BUSPIRONE HYDROCHLORIDE 5 MG: 5 TABLET ORAL at 08:06

## 2022-06-30 RX ADMIN — REMDESIVIR 100 MG: 100 INJECTION, POWDER, LYOPHILIZED, FOR SOLUTION INTRAVENOUS at 11:06

## 2022-06-30 RX ADMIN — HEPARIN SODIUM 12 UNITS/KG/HR: 5000 INJECTION INTRAVENOUS; SUBCUTANEOUS at 12:06

## 2022-06-30 RX ADMIN — BUSPIRONE HYDROCHLORIDE 5 MG: 5 TABLET ORAL at 03:06

## 2022-06-30 RX ADMIN — AMIODARONE HYDROCHLORIDE 400 MG: 200 TABLET ORAL at 08:06

## 2022-06-30 RX ADMIN — POTASSIUM CHLORIDE 50 MEQ: 10 CAPSULE, COATED, EXTENDED RELEASE ORAL at 10:06

## 2022-06-30 NOTE — PROGRESS NOTES
06/29/22 2218   Vital Signs   BP (!) 79/50   MAP (mmHg) 59   BP Location Left arm   BP Method Doppler   Patient Position Lying   Pt states he feels bad. HTS notified.

## 2022-06-30 NOTE — PROGRESS NOTES
06/30/2022  Casper Harris    Current provider:  Antonio Hadley Jr.,*    Device interrogation:  TXP LVAD INTERROGATIONS 6/30/2022 6/30/2022 6/30/2022 6/29/2022 6/29/2022 6/29/2022 6/29/2022   Type HeartMate II HeartMate II HeartMate II HeartMate II HeartMate II HeartMate II HeartMate II   Flow 5.8 6.0 5.7 6.0 5.8 6.5 6.7   Speed 9800 9800 9800 9800 9800 9800 9800   PI 2.6 2.9 2.9 2.9 2.9 3.1 2.8   Power (Jackson) 6.6 6.7 6.7 6.8 6.7 7.0 7.2   LSL 9400 9400 9400 9400 9400 9400 9400   Pulsatility - - - - Intermittent pulse Intermittent pulse Intermittent pulse          Rounded on Tim Richards to ensure all mechanical assist device settings (IABP or VAD) were appropriate and all parameters were within limits.  I was able to ensure all back up equipment was present, the staff had no issues, and the Perfusion Department daily rounding was complete.      For implantable VADs: Interrogation of Ventricular assist device was performed with analysis of device parameters and review of device function. I have personally reviewed the interrogation findings and agree with findings as stated.     In emergency, the nursing units have been notified to contact the perfusion department either by:  Calling [phone] from 630am to 4pm Mon thru Fri, utilizing the On-Call Finder functionality of Epic and searching for Perfusion, or by contacting the hospital  from 4pm to 630am and on weekends and asking to speak with the perfusionist on call.    10:04 AM

## 2022-06-30 NOTE — CODE/ RAPID DOCUMENTATION
RAPID RESPONSE NURSE NOTE        Admit Date: 2022  LOS: 3  Code Status: Full Code   Date of Consult: 2022  : 1965  Age: 56 y.o.  Weight:   Wt Readings from Last 1 Encounters:   22 103.6 kg (228 lb 6.3 oz)     Sex: male  Race: Black or    Bed: 312/312 A:   MRN: [id number]  Time Rapid Response Team page Received: 815  Time Rapid Response Team at Bedside: 817  Time Rapid Response Team left Bedside: 900  Was the patient discharged from an ICU this admission? No   Was the patient discharged from a PACU within last 24 hours? No   Did the patient receive conscious sedation/general anesthesia in last 24 hours? No  Was the patient in the ED within the past 24 hours? No  Was the patient on NIPPV within the past 24 hours? No   Did this progress into an ARC or CPA: no  Attending Physician: Antonio Hadley Jr.,*  Primary Service: Transplant,Cardiology       SITUATION    Notified by telemetry via phone call.  Reason for alert: Sustained monomorphic VT  Called to evaluate the patient for Dysrythmia    BACKGROUND     Why is the patient in the hospital?: <principal problem not specified>    Patient has a past medical history of Anticoagulant long-term use, CHF (congestive heart failure), Class 1 obesity due to excess calories with serious comorbidity and body mass index (BMI) of 31.0 to 31.9 in adult, Dilated cardiomyopathy, Disorder of kidney and ureter, Encounter for blood transfusion, Gout, HTN (hypertension), psychiatric care, ICD (implantable cardioverter-defibrillator) infection, Psychiatric problem, Thyroid disease, and Ventricular tachycardia (paroxysmal).    Last Vitals:  Temp: 98.4 °F (36.9 °C) (318)  Pulse: 88 (318)  Resp: 20 (318)  BP: 88/52 (318)  SpO2: 92 % (318)    24 Hours Vitals Range:  Temp:  [98.2 °F (36.8 °C)-98.9 °F (37.2 °C)]   Pulse:  [79-91]   Resp:  [18-20]   BP: ()/(0-57)   SpO2:  [92 %-98 %]     Labs:  Recent Labs      06/28/22  0527 06/29/22 0448 06/30/22 0445   WBC 9.14 9.03 8.62   HGB 11.5* 10.8* 11.2*   HCT 37.3* 35.9* 34.9*    197 202       Recent Labs     06/27/22  1548 06/27/22  1942 06/29/22 0448 06/29/22 2008 06/30/22 0445   *   < > 136 135* 136   K 5.7*   < > 3.2* 2.9* 3.1*      < > 95 91* 91*   CO2 22*   < > 25 31* 31*   CREATININE 2.8*   < > 2.4* 2.5* 2.4*      < > 103 96 95   PHOS 3.4  --   --   --   --    MG 2.2  --   --  2.0 2.0    < > = values in this interval not displayed.        Recent Labs     06/27/22  1118   PH 7.392   PCO2 38.0   PO2 32*   HCO3 23.1*   POCSATURATED 62*   BE -2        ASSESSMENT    Physical Exam  Constitutional:       Appearance: Normal appearance. He is well-developed.   Cardiovascular:      Rate and Rhythm: Normal rate and regular rhythm.      Heart sounds: Normal heart sounds.      Comments: Post ICD shock  Heart tones: VAD Hum      Pulmonary:      Effort: Pulmonary effort is normal.      Breath sounds: Normal breath sounds.   Musculoskeletal:      Right lower leg: No edema.      Left lower leg: No edema.   Skin:     General: Skin is warm.      Capillary Refill: Capillary refill takes less than 2 seconds.   Neurological:      General: No focal deficit present.      Mental Status: He is alert and oriented to person, place, and time.   Psychiatric:         Mood and Affect: Mood normal.         Behavior: Behavior normal.         Thought Content: Thought content normal.         Judgment: Judgment normal.         INTERVENTIONS  Potassium replacements ordered and being administered.     RECOMMENDATIONS    We recommend: Continue K replacements. Recheck BMP. Consider decreasing diuretics. Pt should remain on central telemetry for continuous monitoring at this time. Pt. should also remain in bed until K is WDL.     PROVIDER ESCALATION     Orders received and case discussed with Dr. Chaves.    Disposition: Remain in room 312.    FOLLOW UP    bedside RNTricia  updated on plan of care. Instructed to call the Rapid Response Nurse, Shefali Soriano RN at [phone] for additional questions or concerns.

## 2022-06-30 NOTE — PROGRESS NOTES
LCSW got a call from Dr. Chapman who states Pt feels like his medical insurance may end today. LCSW called PT's cell, room number and tried to speak to the bedside RN without success. LCSW will follow up with Dr. Chapman. LCSW could not meet with PT due to COVID isolation. SW providing ongoing psychosocial, counseling, & emotional support, education, resources, assistance, and discharge planning as indicated.  SW to continue to follow.

## 2022-06-30 NOTE — PROGRESS NOTES
[facility]  Transplant Heart  Progress Note    Patient Name: Tim Richards  MRN: [id number]  Admission Date: 6/27/2022  Hospital Length of Stay: 2 days  Code Status: Full Code   Attending Physician: KOREY Hadley MD  Primary Care Physician: Deyanira Booth MD  Expected Discharge Date: 7/1/2022  Principal Problem:<principal problem not specified>    Subjective / Interval   States his breathing feels better   Labs consistent with hemolysis      Home meds:  No current facility-administered medications on file prior to encounter.     Current Outpatient Medications on File Prior to Encounter   Medication Sig Dispense Refill    allopurinoL (ZYLOPRIM) 100 MG tablet TAKE 1 TABLET (100 MG) BY MOUTH NIGHTLY. 90 tablet 3    ALPRAZolam (XANAX) 1 MG tablet Take 1 tablet (1 mg total) by mouth daily as needed for Anxiety. Bid prn 30 tablet 1    amiodarone (PACERONE) 200 MG Tab Take 2 tablets (400 mg total) by mouth once daily. 180 tablet 3    amLODIPine (NORVASC) 10 MG tablet TAKE 1 TABLET BY MOUTH EVERY DAY 90 tablet 3    aspirin (ECOTRIN) 81 MG EC tablet Take 1 tablet (81 mg total) by mouth once daily. 30 tablet 11    busPIRone (BUSPAR) 5 MG Tab Take 1 tablet (5 mg total) by mouth 3 (three) times daily. 90 tablet 1    levothyroxine (SYNTHROID) 112 MCG tablet Take 1 tablet (112 mcg total) by mouth before breakfast. 90 tablet 3    mexiletine (MEXITIL) 250 MG Cap Take 1 capsule (250 mg total) by mouth every 8 (eight) hours. 270 capsule 3    pantoprazole (PROTONIX) 40 MG tablet TAKE 1 TABLET (40 MG TOTAL) BY MOUTH DAILY WITH LUNCH. 90 tablet 3    warfarin (COUMADIN) 5 MG tablet TAKE 7.5 MG ORALLY DAILY EVERY SUNDAY AND THURSDAY, TAKE 5 MG ORALLY DAILY ON OTHER DAYS 135 tablet 3    zolpidem (AMBIEN CR) 12.5 MG CR tablet Take 1 tablet (12.5 mg total) by mouth nightly as needed for Insomnia. 30 tablet 5    [DISCONTINUED] ferrous gluconate (FERGON) 324 MG tablet TAKE 1 TABLET (324 MG TOTAL) BY MOUTH WITH  LUNCH. 90 tablet 3    [DISCONTINUED] sildenafiL (VIAGRA) 100 MG tablet Take 1 tablet (100 mg total) by mouth daily as needed for Erectile Dysfunction. 6 tablet 3    [DISCONTINUED] torsemide (DEMADEX) 20 MG Tab Take 1 tablet (20 mg total) by mouth once daily. 180 tablet 3               Objective     Vitals:    06/29/22 1400 06/29/22 1555 06/29/22 1600 06/29/22 1800   BP:  (!) 70/0 (!) 86/57    BP Location:  Left arm Left arm    Patient Position:  Lying Lying    Pulse: 82  79 84   Resp:   18    Temp:   98.2 °F (36.8 °C)    TempSrc:   Oral    SpO2:   96%    Weight:       Height:           I/O last 3 completed shifts:  In: 2764 [P.O.:2764]  Out: 8550 [Urine:8550]  No intake/output data recorded.         Physical Examination: General appearance - alert, well appearing, and in no distress  Mental status - alert, oriented to person, place, and time  Neck - JVD up to angle of mandible  Chest - clear to auscultation, no wheezes, rales or rhonchi, symmetric air entry  Heart - normal rate, regular rhythm, VAD hum  Abdomen - soft, nontender, nondistended, no masses, 15cm liver  Neurological - alert, oriented, normal speech, no focal findings or movement disorder noted  Extremities - BLE edema 1+      Scheduled Meds:   allopurinoL  100 mg Oral Daily    amiodarone  400 mg Oral Daily    amLODIPine  10 mg Oral Daily    [START ON 6/30/2022] aspirin  325 mg Oral Daily    busPIRone  5 mg Oral TID    dipyridamole  75 mg Oral TID    levothyroxine  112 mcg Oral Before breakfast    mexiletine  250 mg Oral Q8H    mupirocin   Nasal BID    pantoprazole  40 mg Oral Daily with lunch    remdesivir infusion  100 mg Intravenous Daily     Continuous Infusions:   furosemide (LASIX) 2 mg/mL continuous infusion (non-titrating) 40 mg/hr (06/29/22 1755)     PRN Meds:.acetaminophen, ALPRAZolam, sodium chloride 0.9%, zolpidem      Labs  BMP  Lab Results   Component Value Date     06/29/2022    K 3.2 (L) 06/29/2022    CL 95 06/29/2022     CO2 25 06/29/2022    BUN 39 (H) 06/29/2022    CREATININE 2.4 (H) 06/29/2022    CALCIUM 9.5 06/29/2022    ANIONGAP 16 06/29/2022    ESTGFRAFRICA 33.8 (A) 06/29/2022    EGFRNONAA 29.3 (A) 06/29/2022       Lab Results   Component Value Date    WBC 9.03 06/29/2022    HGB 10.8 (L) 06/29/2022    HCT 35.9 (L) 06/29/2022    MCV 87 06/29/2022     06/29/2022         BNP  Recent Labs   Lab 06/29/22  0448   BNP 1,292*       Lab Results   Component Value Date     (H) 06/29/2022     (H) 06/29/2022    ALKPHOS 111 06/29/2022    BILITOT 2.3 (H) 06/29/2022       Lab Results   Component Value Date    CHOL 130 05/19/2022    CHOL 170 11/11/2021    CHOL 230 (H) 05/05/2021     Lab Results   Component Value Date    HDL 46 05/19/2022    HDL 55 11/11/2021    HDL 43 05/05/2021     Lab Results   Component Value Date    LDLCALC 54.8 (L) 05/19/2022    LDLCALC 93.2 11/11/2021    LDLCALC 143.2 05/05/2021     Lab Results   Component Value Date    TRIG 146 05/19/2022    TRIG 109 11/11/2021    TRIG 219 (H) 05/05/2021     Lab Results   Component Value Date    CHOLHDL 35.4 05/19/2022    CHOLHDL 32.4 11/11/2021    CHOLHDL 18.7 (L) 05/05/2021       Cardiac studies  Results for orders placed during the hospital encounter of 05/19/22    Echo Color Flow Doppler? Yes    Interpretation Summary  · There is an LVAD present. Base speed is 9800 RPMs. The pump type is a Heartmate III. The interventricular septum appears to bow into the right ventricle. The aortic valve does not open.  · The left ventricle is severely enlarged with severe eccentric hypertrophy and severely decreased systolic function.  · The estimated ejection fraction is 10%.  · There is severe left ventricular global hypokinesis.  · There is abnormal septal wall motion.  · Grade I left ventricular diastolic dysfunction.  · Severe left atrial enlargement.  · Normal right ventricular size with mildly reduced right ventricular systolic function.  · Moderate right atrial  enlargement.  · Mild-to-moderate mitral regurgitation.  · Mild tricuspid regurgitation.  · Intermediate central venous pressure (8 mmHg).  · The estimated PA systolic pressure is 26 mmHg.  · The ascending aorta is dilated.      Results for orders placed or performed during the hospital encounter of 06/27/22   EKG 12-lead    Collection Time: 06/27/22 10:45 AM    Narrative    Test Reason : SOB    Vent. Rate : 093 BPM     Atrial Rate : 093 BPM     P-R Int : 176 ms          QRS Dur : 104 ms      QT Int : 442 ms       P-R-T Axes : 000 140 -18 degrees     QTc Int : 549 ms    Normal sinus rhythm  Low voltage QRS  Possible Anterolateral infarct ,age undetermined  IVCD  When compared with ECG of 21-JAN-2022 14:59,  NE interval has decreased    Confirmed by YUMI CEVALLOS MD ([id number]) on 6/27/2022 4:16:18 PM    Referred By: [email or web address]   SELF           Confirmed By:YUMI CEVALLOS MD       Echo 6/28/2022  · There is an LVAD present. Base speed is 9800 RPMs. The pump type is a Heartmate II. Ramp study. At 9800 rpm the mitral valve leaflets fail to coapt and septum bows slightly to the right with intermittent aortic valve opening with LVEDd 9.3 cm, severe MR and mild AR. At 75318 rpm the septum appears more midline and LVIDd measures 9 cm with slightly reduced mitral regurgitation (better leaflet coaptation) but slightly worse AR (moderate).  · The left ventricle is severely enlarged with severely decreased systolic function. The estimated ejection fraction is 10%.  · There is severe left ventricular global hypokinesis.  · Moderately reduced right ventricular systolic function.  · Left ventricular diastolic dysfunction.  · Severe mitral regurgitation.          Assessment and Plan:   56-yo M with hx of Stage D HFrEF (NICMP, EF 10%, previously NYHA II), s/p  HM2 implanted in march/2018, SC ICD, VT on amiodarone and mexiletine and amiodarone induced hypothyroidism. Patient with volume overload (JVD up to angle of mandible, extremity  edema and hepatomegaly, elevated BNP, low sodium and low hemoglobin. Overall warm and wet, will admit to ICU for lasix drip. Also found to have covid 19 so will treat with remdesivir initially as per id recs.    #ADHF  #NICMP  #LVAD HM2  -acutely decompensated HF, warm and wet with presence of HM2 LVAD   -Hypoxic, improves kwan  -Hypertensive (MAP 96mmHg)  and severely volume overloaded (BNP almost 2K, hyponatremic, pulm edema, hepatomelgaly)  -Minimal response to lasix iv once  -Will continue Amlodipine 10mg daily  -Will hold Warfarin as INR is 3 today  -Lasix gtt at 40mg/hr  -Strict I/O  -Speed echo at 9800 showed bowing to the [place] [address], IVS was at midline, MR decreased but worse AR  -Pending plasma free Hb and UA     TXP LVAD INTERROGATIONS 6/29/2022 6/29/2022 6/29/2022 6/29/2022 6/29/2022 6/29/2022 6/28/2022   Type HeartMate II HeartMate II HeartMate II HeartMate II HeartMate II HeartMate II HeartMate II   Flow 5.8 6.5 6.7 6.8 6.4 6.7 6.3   Speed 9800 9800 9800 9800 9800 9800 9800   PI 2.9 3.1 2.8 3.3 2.8 3.2 3.1   Power (Jackson) 6.7 7.0 7.2 6.6 7.0 7.2 7.0   LSL 9400 9400 9400 9400 - - -   Pulsatility Intermittent pulse Intermittent pulse Intermittent pulse Intermittent pulse - - -     #WAGNER  -Cr improving  -FeUrea showed intrinsic pattern but crs could be the initial trigger  -Will avoid nephrotoxic drugs     #Elevated LFTs  -AST>ALT, TB also elevated  -Likely from hepatic congestion, will f/u daily    #Elevated LDH  #Possible pump thrombosis  -Usually an elevated LDH is a red flag in a patient with LVAD, especially HM2 given the risk of pump thrombosis but PI, power and flow are similar to prior clinic visits and covid-19 infection can increase LDH  -Power is actually slightly lower than prior visit clinics  -LDH is trending up (although trend could be confounded by covid infection)  -Haptoglobin low (although it has been chronically low  -Direct and indirect wendy test are negative    -Plasma free hemoglobin elevated  -At this point we are concerned that he could have some degree of thrombosis (either before or after the rotor of the lvad so in the meantime we will  -Increase aspirin from 81 to 325mg daily   -Add Dipyridamole 75mg tid     #Covid-19 infection  -Hypoxia could be explained by pulmonary edema  -Refers chills, nausea and soft stools  -ID consulted, recommended Remdesivir, ordered    #Hx of VT  -s/p SC ICD   -Will continue mexiletine 25mg q8hrs and amiodarone 400mg daily  -Keep K>4 and Mg>2    #Amiodarone induced hypothyroidism  -Will continue amiodarone given repeated episodes of VT  -Continue levothyroxine 112 mcg     #Anxiety  -Zolpidem night prn  -Alprazolam prn anxiety    Ari Yun MD  Cardiology, PGY-IV  Transplant Heart  [facility] Dept

## 2022-06-30 NOTE — PLAN OF CARE
Plan of care discussed with pt. VSS. A&Ox4; independent. Denies c/o of CP or SOB. Telemetry monitor showing NSR. Lasix gtt infusing per MAR. No VAD alarms throughout shift. COVID precautions maintained. Pt remains free of injury or falls. All questions addressed.

## 2022-06-30 NOTE — PROGRESS NOTES
[facility]  Transplant Heart  Progress Note    Patient Name: Tim Richards  MRN: [id number]  Admission Date: 6/27/2022  Hospital Length of Stay: 3 days  Code Status: Full Code   Attending Physician: KOREY Hadley MD  Primary Care Physician: Deyanira Booth MD  Expected Discharge Date: 7/1/2022  Principal Problem:<principal problem not specified>    Subjective / Interval   States his breathing feels better   Labs consistent with hemolysis  Had an episode of sustained VT for 4 minutes, asymptomatic, LVAD w/o alarms followed by ICD shock.      Objective     Vitals:    06/29/22 2314 06/30/22 0318 06/30/22 0753 06/30/22 1138   BP: (!) 102/56 (!) 88/52     BP Location:  Left arm     Patient Position:  Lying     Pulse: 86 88  62   Resp:  20  17   Temp:  98.4 °F (36.9 °C)  96.7 °F (35.9 °C)   TempSrc:  Oral  Oral   SpO2:  (!) 92%  99%   Weight:   103.6 kg (228 lb 6.3 oz)    Height:           I/O last 3 completed shifts:  In: 2184 [P.O.:2184]  Out: 33880 [Urine:73407]  No intake/output data recorded.         Physical Examination: General appearance - alert, well appearing, and in no distress  Mental status - alert, oriented to person, place, and time  Neck - JVD at the level of clavicle  Chest - clear to auscultation, no wheezes, rales or rhonchi, symmetric air entry  Heart - normal rate, regular rhythm, VAD hum  Abdomen - soft, nontender, nondistended, no masses,  Neurological - alert, oriented, normal speech, no focal findings or movement disorder noted  Extremities - no edema      Scheduled Meds:   allopurinoL  100 mg Oral Daily    amiodarone  400 mg Oral Daily    amLODIPine  10 mg Oral Daily    aspirin  325 mg Oral Daily    busPIRone  5 mg Oral TID    levothyroxine  112 mcg Oral Before breakfast    mexiletine  250 mg Oral Q8H    mupirocin   Nasal BID    pantoprazole  40 mg Oral Daily with lunch    potassium chloride  20 mEq Oral Q2H    remdesivir infusion  100 mg Intravenous Daily      Continuous Infusions:   eptifibatide      heparin (porcine) in D5W       PRN Meds:.acetaminophen, ALPRAZolam, sodium chloride 0.9%, zolpidem      Labs  BMP  Lab Results   Component Value Date     06/30/2022    K 3.1 (L) 06/30/2022    CL 91 (L) 06/30/2022    CO2 31 (H) 06/30/2022    BUN 42 (H) 06/30/2022    CREATININE 2.4 (H) 06/30/2022    CALCIUM 10.0 06/30/2022    ANIONGAP 14 06/30/2022    ESTGFRAFRICA 33.8 (A) 06/30/2022    EGFRNONAA 29.3 (A) 06/30/2022       Lab Results   Component Value Date    WBC 8.62 06/30/2022    HGB 11.2 (L) 06/30/2022    HCT 34.9 (L) 06/30/2022    MCV 83 06/30/2022     06/30/2022         BNP  Recent Labs   Lab 06/29/22  0448   BNP 1,292*       Lab Results   Component Value Date    ALT 93 (H) 06/30/2022     (H) 06/30/2022    ALKPHOS 109 06/30/2022    BILITOT 2.9 (H) 06/30/2022       Lab Results   Component Value Date    CHOL 130 05/19/2022    CHOL 170 11/11/2021    CHOL 230 (H) 05/05/2021     Lab Results   Component Value Date    HDL 46 05/19/2022    HDL 55 11/11/2021    HDL 43 05/05/2021     Lab Results   Component Value Date    LDLCALC 54.8 (L) 05/19/2022    LDLCALC 93.2 11/11/2021    LDLCALC 143.2 05/05/2021     Lab Results   Component Value Date    TRIG 146 05/19/2022    TRIG 109 11/11/2021    TRIG 219 (H) 05/05/2021     Lab Results   Component Value Date    CHOLHDL 35.4 05/19/2022    CHOLHDL 32.4 11/11/2021    CHOLHDL 18.7 (L) 05/05/2021       Cardiac studies  Results for orders placed during the hospital encounter of 05/19/22    Echo Color Flow Doppler? Yes    Interpretation Summary  · There is an LVAD present. Base speed is 9800 RPMs. The pump type is a Heartmate III. The interventricular septum appears to bow into the right ventricle. The aortic valve does not open.  · The left ventricle is severely enlarged with severe eccentric hypertrophy and severely decreased systolic function.  · The estimated ejection fraction is 10%.  · There is severe left  ventricular global hypokinesis.  · There is abnormal septal wall motion.  · Grade I left ventricular diastolic dysfunction.  · Severe left atrial enlargement.  · Normal right ventricular size with mildly reduced right ventricular systolic function.  · Moderate right atrial enlargement.  · Mild-to-moderate mitral regurgitation.  · Mild tricuspid regurgitation.  · Intermediate central venous pressure (8 mmHg).  · The estimated PA systolic pressure is 26 mmHg.  · The ascending aorta is dilated.      Results for orders placed or performed during the hospital encounter of 06/27/22   EKG 12-LEAD    Collection Time: 06/30/22  8:23 AM    Narrative    Test Reason : I50.9,    Vent. Rate : 089 BPM     Atrial Rate : 089 BPM     P-R Int : 240 ms          QRS Dur : 104 ms      QT Int : 414 ms       P-R-T Axes : 088 132 -15 degrees     QTc Int : 503 ms    Baseline Artifact  Sinus rhythm with 1st degree A-V block  Low voltage, limb leads  Possible Anterolateral infarct ,age undetermined  Nonspecific intraventricular conduction delay  Nonspecific ST and/or T wave abnormalities  Abnormal ECG  When compared with ECG of 27-JUN-2022 10:45,  CO interval has increased    Confirmed by Byron Piña MD ([id number]) on 6/30/2022 11:41:18 AM    Referred By: [email or web address]   SELF           Confirmed By:Byron Piña MD       Echo 6/28/2022  · There is an LVAD present. Base speed is 9800 RPMs. The pump type is a Heartmate II. Ramp study. At 9800 rpm the mitral valve leaflets fail to coapt and septum bows slightly to the right with intermittent aortic valve opening with LVEDd 9.3 cm, severe MR and mild AR. At 58447 rpm the septum appears more midline and LVIDd measures 9 cm with slightly reduced mitral regurgitation (better leaflet coaptation) but slightly worse AR (moderate).  · The left ventricle is severely enlarged with severely decreased systolic function. The estimated ejection fraction is 10%.  · There is severe left ventricular global  hypokinesis.  · Moderately reduced right ventricular systolic function.  · Left ventricular diastolic dysfunction.  · Severe mitral regurgitation.          Assessment and Plan:   56-yo M with hx of Stage D HFrEF (NICMP, EF 10%, previously NYHA II), s/p  HM2 implanted in march/2018, SC ICD, VT on amiodarone and mexiletine and amiodarone induced hypothyroidism. Patient with volume overload (JVD up to angle of mandible, extremity edema and hepatomegaly, elevated BNP, low sodium and low hemoglobin. Overall warm and wet, will admit to ICU for lasix drip. Also found to have covid 19 so will treat with remdesivir initially as per id recs.    #ICD shock for VT  -This morning  -Could be related to hypokalemia (3.1) refractory to several doses of potassium po and IV, will replenish aggresively  -Will continue mexiletine 25mg q8hrs and amiodarone 400mg daily  -Keep K>4 and Mg>2    #Possible pump thrombosis  -Usually an elevated LDH is a red flag in a patient with LVAD, especially HM2 given the risk of pump thrombosis but PI, power and flow are similar to prior clinic visits and covid-19 infection can increase LDH  -Power is actually slightly lower than prior visit clinics  -LDH is trending up (although trend could be confounded by covid infection)  -Haptoglobin low (although it has been chronically low)  -Direct and indirect wendy test are negative  -Plasma free hemoglobin elevated  -At this point we are concerned that he could have some degree of thrombosis, since adding a second antiplatelet agent didn't resolved it, Integrilin with a Heparin drip were started until we have lab confirmation that this issue that resolved (normalization of LDH etc)  -Integrilin at 1 mcg/kg/min  -Heparin drip  -aspirin 325mg daily    #ADHF  #NICMP  #LVAD HM2  -acutely decompensated HF, warm and wet with presence of HM2 LVAD  -Hypoxic, improves kwan  -Hypertensive (MAP 96mmHg)  and severely volume overloaded (BNP almost 2K, hyponatremic, pulm  edema, hepatomelgaly)  -Minimal response to lasix iv once  -Will continue Amlodipine 10mg daily  -Will hold Warfarin  -Euvolemic today  -Lasix gtt stopped  -Speed echo at 9800 showed bowing to the [place] [id number], IVS was at midline, MR decreased but worse AR  -    TXP LVAD INTERROGATIONS 6/30/2022 6/30/2022 6/30/2022 6/29/2022 6/29/2022 6/29/2022 6/29/2022   Type HeartMate II HeartMate II HeartMate II HeartMate II HeartMate II HeartMate II HeartMate II   Flow 5.8 6.0 5.7 6.0 5.8 6.5 6.7   Speed 9800 9800 9800 9800 9800 9800 9800   PI 2.6 2.9 2.9 2.9 2.9 3.1 2.8   Power (Jackson) 6.6 6.7 6.7 6.8 6.7 7.0 7.2   LSL 9400 9400 9400 9400 9400 9400 9400   Pulsatility - - - - Intermittent pulse Intermittent pulse Intermittent pulse     #WAGNER  -Cr improving  -FeUrea showed intrinsic pattern but crs could be the initial trigger  -Will avoid nephrotoxic drugs     #Covid-19 infection  -Hypoxia could be explained by pulmonary edema  -Refers chills, nausea and soft stools  -ID consulted, recommended Remdesivir, ordered (to be finished on Friday)      #Amiodarone induced hypothyroidism  -Will continue amiodarone given repeated episodes of VT  -Continue levothyroxine 112 mcg     #Anxiety  -Zolpidem night prn  -Alprazolam prn anxiety    Ari Yun MD  Cardiology, PGY-IV  Transplant Heart  [facility] - Emergency Dept

## 2022-07-01 ENCOUNTER — TELEPHONE (OUTPATIENT)
Dept: TRANSPLANT | Facility: CLINIC | Age: 57
End: 2022-07-01
Payer: MEDICARE

## 2022-07-01 PROBLEM — F41.9 ANXIETY: Status: ACTIVE | Noted: 2022-07-01

## 2022-07-01 LAB
ALBUMIN SERPL BCP-MCNC: 3.2 G/DL (ref 3.5–5.2)
ALP SERPL-CCNC: 107 U/L (ref 55–135)
ALT SERPL W/O P-5'-P-CCNC: 75 U/L (ref 10–44)
ANION GAP SERPL CALC-SCNC: 11 MMOL/L (ref 8–16)
ANION GAP SERPL CALC-SCNC: 14 MMOL/L (ref 8–16)
APTT BLDCRRT: 36.1 SEC (ref 21–32)
APTT BLDCRRT: 38 SEC (ref 21–32)
APTT BLDCRRT: 38.5 SEC (ref 21–32)
APTT BLDCRRT: 39.1 SEC (ref 21–32)
ASCENDING AORTA: 3.03 CM
AST SERPL-CCNC: 106 U/L (ref 10–40)
BASOPHILS # BLD AUTO: 0.01 K/UL (ref 0–0.2)
BASOPHILS NFR BLD: 0.1 % (ref 0–1.9)
BILIRUB SERPL-MCNC: 3.5 MG/DL (ref 0.1–1)
BSA FOR ECHO PROCEDURE: 2.29 M2
BUN SERPL-MCNC: 49 MG/DL (ref 6–20)
BUN SERPL-MCNC: 50 MG/DL (ref 6–20)
CALCIUM SERPL-MCNC: 10 MG/DL (ref 8.7–10.5)
CALCIUM SERPL-MCNC: 9.5 MG/DL (ref 8.7–10.5)
CHLORIDE SERPL-SCNC: 92 MMOL/L (ref 95–110)
CHLORIDE SERPL-SCNC: 94 MMOL/L (ref 95–110)
CO2 SERPL-SCNC: 30 MMOL/L (ref 23–29)
CO2 SERPL-SCNC: 32 MMOL/L (ref 23–29)
CREAT SERPL-MCNC: 2.5 MG/DL (ref 0.5–1.4)
CREAT SERPL-MCNC: 2.7 MG/DL (ref 0.5–1.4)
CRP SERPL-MCNC: 175.3 MG/L (ref 0–8.2)
CV ECHO LV RWT: 0.27 CM
DIFFERENTIAL METHOD: ABNORMAL
DOP CALC LVOT AREA: 3 CM2
DOP CALC LVOT DIAMETER: 1.97 CM
ECHO LV POSTERIOR WALL: 1.17 CM (ref 0.6–1.1)
EJECTION FRACTION: 10 %
EOSINOPHIL # BLD AUTO: 0 K/UL (ref 0–0.5)
EOSINOPHIL NFR BLD: 0.3 % (ref 0–8)
ERYTHROCYTE [DISTWIDTH] IN BLOOD BY AUTOMATED COUNT: 14.5 % (ref 11.5–14.5)
EST. GFR  (AFRICAN AMERICAN): 29.3 ML/MIN/1.73 M^2
EST. GFR  (AFRICAN AMERICAN): 32.2 ML/MIN/1.73 M^2
EST. GFR  (NON AFRICAN AMERICAN): 25.4 ML/MIN/1.73 M^2
EST. GFR  (NON AFRICAN AMERICAN): 27.9 ML/MIN/1.73 M^2
FRACTIONAL SHORTENING: 5 % (ref 28–44)
GLUCOSE SERPL-MCNC: 103 MG/DL (ref 70–110)
GLUCOSE SERPL-MCNC: 95 MG/DL (ref 70–110)
HCT VFR BLD AUTO: 37.7 % (ref 40–54)
HGB BLD-MCNC: 11.7 G/DL (ref 14–18)
IMM GRANULOCYTES # BLD AUTO: 0.03 K/UL (ref 0–0.04)
IMM GRANULOCYTES NFR BLD AUTO: 0.4 % (ref 0–0.5)
INR PPP: 1.8 (ref 0.8–1.2)
INTERVENTRICULAR SEPTUM: 1.02 CM (ref 0.6–1.1)
LA MAJOR: 8.63 CM
LA MINOR: 7.35 CM
LA WIDTH: 4.23 CM
LDH SERPL L TO P-CCNC: 1755 U/L (ref 110–260)
LEFT ATRIUM SIZE: 3.45 CM
LEFT ATRIUM VOLUME INDEX: 43.6 ML/M2
LEFT ATRIUM VOLUME: 98.48 CM3
LEFT INTERNAL DIMENSION IN SYSTOLE: 8.39 CM (ref 2.1–4)
LEFT VENTRICLE DIASTOLIC VOLUME INDEX: 188.96 ML/M2
LEFT VENTRICLE DIASTOLIC VOLUME: 427.04 ML
LEFT VENTRICLE MASS INDEX: 239 G/M2
LEFT VENTRICLE SYSTOLIC VOLUME INDEX: 169.5 ML/M2
LEFT VENTRICLE SYSTOLIC VOLUME: 383.02 ML
LEFT VENTRICULAR INTERNAL DIMENSION IN DIASTOLE: 8.81 CM (ref 3.5–6)
LEFT VENTRICULAR MASS: 539.07 G
LYMPHOCYTES # BLD AUTO: 1.1 K/UL (ref 1–4.8)
LYMPHOCYTES NFR BLD: 13.6 % (ref 18–48)
MAGNESIUM SERPL-MCNC: 2.1 MG/DL (ref 1.6–2.6)
MCH RBC QN AUTO: 26 PG (ref 27–31)
MCHC RBC AUTO-ENTMCNC: 31 G/DL (ref 32–36)
MCV RBC AUTO: 84 FL (ref 82–98)
MONOCYTES # BLD AUTO: 1.5 K/UL (ref 0.3–1)
MONOCYTES NFR BLD: 19.4 % (ref 4–15)
NEUTROPHILS # BLD AUTO: 5.2 K/UL (ref 1.8–7.7)
NEUTROPHILS NFR BLD: 66.2 % (ref 38–73)
NRBC BLD-RTO: 0 /100 WBC
PLATELET # BLD AUTO: 211 K/UL (ref 150–450)
PMV BLD AUTO: 11.4 FL (ref 9.2–12.9)
POTASSIUM SERPL-SCNC: 3.3 MMOL/L (ref 3.5–5.1)
POTASSIUM SERPL-SCNC: 3.9 MMOL/L (ref 3.5–5.1)
PROT SERPL-MCNC: 8 G/DL (ref 6–8.4)
PROTHROMBIN TIME: 18.1 SEC (ref 9–12.5)
RA MAJOR: 5.99 CM
RA PRESSURE: 15 MMHG
RA WIDTH: 4.33 CM
RBC # BLD AUTO: 4.5 M/UL (ref 4.6–6.2)
RIGHT VENTRICULAR END-DIASTOLIC DIMENSION: 4.5 CM
SINUS: 3.2 CM
SODIUM SERPL-SCNC: 136 MMOL/L (ref 136–145)
SODIUM SERPL-SCNC: 137 MMOL/L (ref 136–145)
STJ: 2.89 CM
TDI LATERAL: 0.04 M/S
TDI SEPTAL: 0.06 M/S
TDI: 0.05 M/S
TRICUSPID ANNULAR PLANE SYSTOLIC EXCURSION: 2.03 CM
WBC # BLD AUTO: 7.8 K/UL (ref 3.9–12.7)

## 2022-07-01 PROCEDURE — 85025 COMPLETE CBC W/AUTO DIFF WBC: CPT | Performed by: STUDENT IN AN ORGANIZED HEALTH CARE EDUCATION/TRAINING PROGRAM

## 2022-07-01 PROCEDURE — 94760 N-INVAS EAR/PLS OXIMETRY 1: CPT

## 2022-07-01 PROCEDURE — 99233 SBSQ HOSP IP/OBS HIGH 50: CPT | Mod: ,,, | Performed by: INTERNAL MEDICINE

## 2022-07-01 PROCEDURE — 99900035 HC TECH TIME PER 15 MIN (STAT)

## 2022-07-01 PROCEDURE — 63600175 PHARM REV CODE 636 W HCPCS: Performed by: INTERNAL MEDICINE

## 2022-07-01 PROCEDURE — 25000003 PHARM REV CODE 250: Performed by: STUDENT IN AN ORGANIZED HEALTH CARE EDUCATION/TRAINING PROGRAM

## 2022-07-01 PROCEDURE — 27000248 HC VAD-ADDITIONAL DAY

## 2022-07-01 PROCEDURE — 83615 LACTATE (LD) (LDH) ENZYME: CPT | Performed by: STUDENT IN AN ORGANIZED HEALTH CARE EDUCATION/TRAINING PROGRAM

## 2022-07-01 PROCEDURE — 85610 PROTHROMBIN TIME: CPT | Performed by: STUDENT IN AN ORGANIZED HEALTH CARE EDUCATION/TRAINING PROGRAM

## 2022-07-01 PROCEDURE — 93750 PR INTERROGATE VENT ASSIST DEV, IN PERSON, W PHYSICIAN ANALYSIS: ICD-10-PCS | Mod: ,,, | Performed by: INTERNAL MEDICINE

## 2022-07-01 PROCEDURE — 86140 C-REACTIVE PROTEIN: CPT | Performed by: STUDENT IN AN ORGANIZED HEALTH CARE EDUCATION/TRAINING PROGRAM

## 2022-07-01 PROCEDURE — 25000003 PHARM REV CODE 250: Performed by: INTERNAL MEDICINE

## 2022-07-01 PROCEDURE — 20600001 HC STEP DOWN PRIVATE ROOM

## 2022-07-01 PROCEDURE — 80053 COMPREHEN METABOLIC PANEL: CPT | Performed by: STUDENT IN AN ORGANIZED HEALTH CARE EDUCATION/TRAINING PROGRAM

## 2022-07-01 PROCEDURE — 36415 COLL VENOUS BLD VENIPUNCTURE: CPT | Performed by: INTERNAL MEDICINE

## 2022-07-01 PROCEDURE — 80048 BASIC METABOLIC PNL TOTAL CA: CPT | Performed by: STUDENT IN AN ORGANIZED HEALTH CARE EDUCATION/TRAINING PROGRAM

## 2022-07-01 PROCEDURE — 83735 ASSAY OF MAGNESIUM: CPT | Performed by: STUDENT IN AN ORGANIZED HEALTH CARE EDUCATION/TRAINING PROGRAM

## 2022-07-01 PROCEDURE — 36415 COLL VENOUS BLD VENIPUNCTURE: CPT | Performed by: STUDENT IN AN ORGANIZED HEALTH CARE EDUCATION/TRAINING PROGRAM

## 2022-07-01 PROCEDURE — 94761 N-INVAS EAR/PLS OXIMETRY MLT: CPT

## 2022-07-01 PROCEDURE — 99233 PR SUBSEQUENT HOSPITAL CARE,LEVL III: ICD-10-PCS | Mod: ,,, | Performed by: INTERNAL MEDICINE

## 2022-07-01 PROCEDURE — 93750 INTERROGATION VAD IN PERSON: CPT | Mod: ,,, | Performed by: INTERNAL MEDICINE

## 2022-07-01 PROCEDURE — 94660 CPAP INITIATION&MGMT: CPT

## 2022-07-01 PROCEDURE — 85730 THROMBOPLASTIN TIME PARTIAL: CPT | Performed by: INTERNAL MEDICINE

## 2022-07-01 PROCEDURE — 63600175 PHARM REV CODE 636 W HCPCS: Mod: TB | Performed by: STUDENT IN AN ORGANIZED HEALTH CARE EDUCATION/TRAINING PROGRAM

## 2022-07-01 PROCEDURE — 27000207 HC ISOLATION

## 2022-07-01 RX ORDER — POTASSIUM CHLORIDE 20 MEQ/1
40 TABLET, EXTENDED RELEASE ORAL
Status: DISCONTINUED | OUTPATIENT
Start: 2022-07-01 | End: 2022-07-01

## 2022-07-01 RX ORDER — POTASSIUM CHLORIDE 20 MEQ/1
40 TABLET, EXTENDED RELEASE ORAL
Status: COMPLETED | OUTPATIENT
Start: 2022-07-01 | End: 2022-07-01

## 2022-07-01 RX ORDER — POTASSIUM CHLORIDE 20 MEQ/1
20 TABLET, EXTENDED RELEASE ORAL ONCE
Status: COMPLETED | OUTPATIENT
Start: 2022-07-01 | End: 2022-07-01

## 2022-07-01 RX ADMIN — PANTOPRAZOLE SODIUM 40 MG: 40 TABLET, DELAYED RELEASE ORAL at 12:07

## 2022-07-01 RX ADMIN — MEXILETINE HYDROCHLORIDE 250 MG: 250 CAPSULE ORAL at 10:07

## 2022-07-01 RX ADMIN — MEXILETINE HYDROCHLORIDE 250 MG: 250 CAPSULE ORAL at 02:07

## 2022-07-01 RX ADMIN — POTASSIUM CHLORIDE 20 MEQ: 1500 TABLET, EXTENDED RELEASE ORAL at 04:07

## 2022-07-01 RX ADMIN — BUSPIRONE HYDROCHLORIDE 5 MG: 5 TABLET ORAL at 09:07

## 2022-07-01 RX ADMIN — LEVOTHYROXINE SODIUM 112 MCG: 112 TABLET ORAL at 06:07

## 2022-07-01 RX ADMIN — AMLODIPINE BESYLATE 10 MG: 10 TABLET ORAL at 09:07

## 2022-07-01 RX ADMIN — BUSPIRONE HYDROCHLORIDE 5 MG: 5 TABLET ORAL at 02:07

## 2022-07-01 RX ADMIN — MEXILETINE HYDROCHLORIDE 250 MG: 250 CAPSULE ORAL at 06:07

## 2022-07-01 RX ADMIN — EPTIFIBATIDE 1 MCG/KG/MIN: 0.75 INJECTION INTRAVENOUS at 08:07

## 2022-07-01 RX ADMIN — ALLOPURINOL 100 MG: 100 TABLET ORAL at 09:07

## 2022-07-01 RX ADMIN — EPTIFIBATIDE 1 MCG/KG/MIN: 0.75 INJECTION INTRAVENOUS at 09:07

## 2022-07-01 RX ADMIN — AMIODARONE HYDROCHLORIDE 400 MG: 200 TABLET ORAL at 09:07

## 2022-07-01 RX ADMIN — POTASSIUM CHLORIDE 40 MEQ: 1500 TABLET, EXTENDED RELEASE ORAL at 10:07

## 2022-07-01 RX ADMIN — ASPIRIN 325 MG: 325 TABLET, COATED ORAL at 09:07

## 2022-07-01 RX ADMIN — HEPARIN SODIUM 14 UNITS/KG/HR: 5000 INJECTION INTRAVENOUS; SUBCUTANEOUS at 10:07

## 2022-07-01 RX ADMIN — ACETAMINOPHEN 650 MG: 325 TABLET ORAL at 04:07

## 2022-07-01 RX ADMIN — REMDESIVIR 100 MG: 100 INJECTION, POWDER, LYOPHILIZED, FOR SOLUTION INTRAVENOUS at 09:07

## 2022-07-01 RX ADMIN — POTASSIUM CHLORIDE 40 MEQ: 1500 TABLET, EXTENDED RELEASE ORAL at 09:07

## 2022-07-01 NOTE — ASSESSMENT & PLAN NOTE
Procedure: Device Interrogation Including analysis of device parameters  Current Settings: Ventricular Assist Device  Review of device function is stable    TXP LVAD INTERROGATIONS 7/1/2022 7/1/2022 6/30/2022 6/30/2022 6/30/2022 6/30/2022 6/30/2022   Type - HeartMate II HeartMate II HeartMate II HeartMate II HeartMate II HeartMate II   Flow 7.0 7.7 7.6 5.7 5.6 5.8 6.0   Speed 9800 9800 9790 9800 9800 9800 9800   PI 2.5 2.6 2.7 2.6 2.5 2.6 2.9   Power (Jackson) 7.4 7.8 7.7 6.6 6.8 6.6 6.7   LSL 9400 - - - 9400 9400 9400   Pulsatility - Intermittent pulse Intermittent pulse - - - -

## 2022-07-01 NOTE — ASSESSMENT & PLAN NOTE
WAGNER on CKD  Creatinine is 2.5 today with baseline of 1.5-1.9.  - Avoiding nephrotoxic medications  - Continue to monitor  - Strict I/O's

## 2022-07-01 NOTE — ASSESSMENT & PLAN NOTE
-Usually an elevated LDH is a red flag in a patient with LVAD, especially HM2 given the risk of pump thrombosis. PI, power and flow are elevated when compared to prior clinic visits  -LDH is trending up (although trend could be confounded by covid infection)  -Haptoglobin low (although it has been chronically low)  -Direct and indirect wendy test are negative  -Plasma free hemoglobin elevated  -At this point we are concerned that he could have some degree of thrombosis, since adding a second antiplatelet agent didn't resolved it, Integrilin with a Heparin drip were started until we have lab confirmation that this issue that resolved (normalization of LDH etc)  -Integrilin at 1 mcg/kg/min  -Heparin drip  -Aspirin 325mg daily    Procedure: Device Interrogation Including analysis of device parameters  Current Settings: Ventricular Assist Device    TXP LVAD INTERROGATIONS 7/1/2022 7/1/2022 6/30/2022 6/30/2022 6/30/2022 6/30/2022 6/30/2022   Type - HeartMate II HeartMate II HeartMate II HeartMate II HeartMate II HeartMate II   Flow 7.0 7.7 7.6 5.7 5.6 5.8 6.0   Speed 9800 9800 9790 9800 9800 9800 9800   PI 2.5 2.6 2.7 2.6 2.5 2.6 2.9   Power (Jackson) 7.4 7.8 7.7 6.6 6.8 6.6 6.7   LSL 9400 - - - 9400 9400 9400   Pulsatility - Intermittent pulse Intermittent pulse - - - -

## 2022-07-01 NOTE — ASSESSMENT & PLAN NOTE
-Previously hypoxic, now off oxygen  -Refers chills, nausea and soft stools  -ID consulted, recommended Remdesivir, course to be completed today

## 2022-07-01 NOTE — ASSESSMENT & PLAN NOTE
#ADHF  #NICMP  -Admitted with acute decompensated HF, warm and wet with presence of HM2 LVAD  -Was placed on lasix gtt but appeared euvolemic and lasix gtt was stopped  -Continue Amlodipine 10mg daily  -Holding Warfarin  -Speed echo at 9800 showed bowing to the right and [place] [id number], IVS was at midline, MR decreased but worse AR

## 2022-07-01 NOTE — PROGRESS NOTES
Dr. Larsen notified of pt's 3.1 K this am and PO replacement throughout day. MD ordered BMP to check current K. Will continue to monitor.

## 2022-07-01 NOTE — PLAN OF CARE
K replaced (100meq PO). Echo completed. Patient received a dose of remdesivir. Last PTT 39.1 which is therapeutic x1 so no rate/dose change on gtt. Next PTT at 1840. Heparin gtt continued at 14u, 12.5ml. Plan of care discussed with patient.  Patient ambulating independently, fall precautions in place. LVAD DP and numbers WNL, smooth LVAD hum. LVAD dressing to be changed tomorrow by the patient.  Patient has no complaints of pain. Discussed medications and care. Patient has no questions at this time. Will continue to monitor.

## 2022-07-01 NOTE — PLAN OF CARE
Pt cooperative with routine care and procedures. Pt VSS. Pt bed in lowest position. Call bell and urinal within reach. Pt remained free from s/s of distress and pain. Scheduled medications given. Questions answered. Airborne and droplet precautions maintained. Heparin going at 14units/kg/hr. Eptifibatide 1mcg/kg/min 8.7ml/hr given. LVAD dressing change due 7/2.     Problem: Adult Inpatient Plan of Care  Goal: Plan of Care Review  Outcome: Ongoing, Progressing  Flowsheets (Taken 7/1/2022 0257)  Plan of Care Reviewed With: patient  Goal: Patient-Specific Goal (Individualized)  Outcome: Ongoing, Progressing  Flowsheets (Taken 7/1/2022 0257)  Anxieties, Fears or Concerns: None mentioned  Individualized Care Needs: monitor s/s, VS, LVAD numbers  Patient-Specific Goals (Include Timeframe): To remaine injury free before discharge  Goal: Absence of Hospital-Acquired Illness or Injury  Outcome: Ongoing, Progressing  Goal: Optimal Comfort and Wellbeing  Outcome: Ongoing, Progressing  Goal: Readiness for Transition of Care  Outcome: Ongoing, Progressing     Problem: Infection  Goal: Absence of Infection Signs and Symptoms  Outcome: Ongoing, Progressing     Problem: Neutropenia  Goal: Absence of Infection  Outcome: Ongoing, Progressing     Problem: Fall Injury Risk  Goal: Absence of Fall and Fall-Related Injury  Outcome: Ongoing, Progressing

## 2022-07-01 NOTE — PROCEDURES
"Tim Richards is a 56 y.o. male patient.    Temp: 96 °F (35.6 °C) (07/01/22 1211)  Pulse: (!) 113 (07/01/22 0905)  Resp: 16 (07/01/22 1211)  BP: 98/68 (07/01/22 1211)  SpO2: (!) 94 % (07/01/22 1211)  Weight: 101.8 kg (224 lb 6.9 oz) (07/01/22 0700)  Height: 6' 1" (185.4 cm) (06/28/22 1300)    Procedures    TXP LVAD INTERROGATIONS 7/1/2022 7/1/2022 6/30/2022 6/30/2022 6/30/2022 6/30/2022 6/30/2022   Type - HeartMate II HeartMate II HeartMate II HeartMate II HeartMate II HeartMate II   Flow 7.0 7.7 7.6 5.7 5.6 5.8 6.0   Speed 9800 9800 9790 9800 9800 9800 9800   PI 2.5 2.6 2.7 2.6 2.5 2.6 2.9   Power (Jackson) 7.4 7.8 7.7 6.6 6.8 6.6 6.7   LSL 9400 - - - 9400 9400 9400   Pulsatility - Intermittent pulse Intermittent pulse - - - -     Interrogation of Ventricular assist device was performed with physician analysis of device parameters and review of device function. I have personally reviewed the interrogation findings and agree with findings as stated.     Power in 7's since approx 2 AM and previously 6's with rare 7.  Follow closely with LDH increasing further.    Phone conference call around noon with myself and Sabi Kaufman and Meagan.  With COVID infection nothing at present but felt best to not use t-PA with prior massive GIB.  He has lost weight but since March 2021 Cr chiefly low 2's.    7/1/2022  "

## 2022-07-01 NOTE — PROGRESS NOTES
[facility] - Cardiology Stepdown  Heart Transplant  Progress Note    Patient Name: Tim Richards  MRN: [id number]  Admission Date: 6/27/2022  Hospital Length of Stay: 4 days  Attending Physician: Antonio Hadley Jr.,*  Primary Care Provider: Deyanira Booth MD  Principal Problem:Left ventricular assist device (LVAD) complication    Subjective:     Interval Events:  No acute events overnight  No events on telemetry  The patient denies symptoms at this time    Past Medical History:   Diagnosis Date    Anticoagulant long-term use     CHF (congestive heart failure)     Dilated cardiomyopathy 1/10/2018    Disorder of kidney and ureter     CKD    Encounter for blood transfusion     Gout     HTN (hypertension)     Hx of psychiatric care     ICD (implantable cardioverter-defibrillator) infection 7/1/2020    Psychiatric problem     Thyroid disease     Ventricular tachycardia (paroxysmal)        Past Surgical History:   Procedure Laterality Date    CARDIAC CATHETERIZATION  Dec. 2012    CARDIAC DEFIBRILLATOR PLACEMENT Left     CRRT-D    COLONOSCOPY N/A 3/6/2018    Procedure: COLONOSCOPY;  Surgeon: Alonso Bone MD;  Location: [facility] ([address] [place] [address]);  Service: Endoscopy;  Laterality: N/A;    COLONOSCOPY N/A 7/17/2019    Procedure: COLONOSCOPY;  Surgeon: Blane Valdez MD;  [facility] ([facility]  Service: Endoscopy;  Laterality: N/A;    COLONOSCOPY N/A 7/18/2019    Procedure: COLONOSCOPY;  Surgeon: Blane Valdez MD;  [facility]  Service: Endoscopy;  Laterality: N/A;    ESOPHAGOGASTRODUODENOSCOPY N/A 7/17/2019    Procedure: EGD (ESOPHAGOGASTRODUODENOSCOPY);  Surgeon: Blane Valdez MD;  [facility]  Service: Endoscopy;  Laterality: N/A;    ESOPHAGOGASTRODUODENOSCOPY N/A 7/18/2019    Procedure: EGD (ESOPHAGOGASTRODUODENOSCOPY);  Surgeon: Blane Valdez MD;  [facility]  Service: Endoscopy;  Laterality: N/A;    NONINVASIVE CARDIAC ELECTROPHYSIOLOGY STUDY  N/A 10/18/2019    Procedure: CARDIAC ELECTROPHYSIOLOGY STUDY, NONINVASIVE;  Surgeon: Raz Wagner MD;  Location: [facility] EP LAB;  Service: Cardiology;  Laterality: N/A;  VT, DFTs, MDT CRTD in situ, LVAD, juarez MB, 3098    REPLACEMENT OF IMPLANTABLE CARDIOVERTER-DEFIBRILLATOR (ICD) GENERATOR N/A 3/9/2020    Procedure: REPLACEMENT, ICD GENERATOR;  Surgeon: Harry Yun MD;  Location: [facility] EP LAB;  Service: Cardiology;  Laterality: N/A;  VT, ICD Gen Change and Lead Revision, MDT, MAC, DM,3 Prep    REVISION OF IMPLANTABLE CARDIOVERTER-DEFIBRILLATOR (ICD) ELECTRODE LEAD PLACEMENT N/A 3/9/2020    Procedure: REVISION, INSERTION, ELECTRODE LEAD, ICD;  Surgeon: Harry Yun MD;  Location: [facility] EP LAB;  Service: Cardiology;  Laterality: N/A;  VT, ICD Gen Change and Lead Revision, MDT, MAC, DM,3 Prep    TREATMENT OF CARDIAC ARRHYTHMIA  10/18/2019    Procedure: Cardioversion or Defibrillation;  Surgeon: Raz Wagner MD;  Location: [facility] EP LAB;  Service: Cardiology;;       Review of patient's allergies indicates:   Allergen Reactions    Lisinopril Anaphylaxis    Hydralazine analogues      Chronic constipation, impotence, dizziness       Current Facility-Administered Medications   Medication    carvediloL tablet 3.125 mg    furosemide (LASIX) 200 mg in dextrose 5 % 100 mL continuous infusion (conc: 2 mg/mL)    furosemide injection 80 mg    nitroGLYCERIN 50 mg in dextrose 5 % 250 mL infusion (TITRATING)     Current Outpatient Medications   Medication Sig    allopurinoL (ZYLOPRIM) 100 MG tablet TAKE 1 TABLET (100 MG) BY MOUTH NIGHTLY.    ALPRAZolam (XANAX) 1 MG tablet Take 1 tablet (1 mg total) by mouth daily as needed for Anxiety. Bid prn    amiodarone (PACERONE) 200 MG Tab Take 2 tablets (400 mg total) by mouth once daily.    amLODIPine (NORVASC) 10 MG tablet TAKE 1 TABLET BY MOUTH EVERY DAY    aspirin (ECOTRIN) 81 MG EC tablet Take 1 tablet (81 mg total) by mouth once daily.    busPIRone  (BUSPAR) 5 MG Tab Take 1 tablet (5 mg total) by mouth 3 (three) times daily.    carvediloL (COREG) 3.125 MG tablet Take 3.125 mg by mouth once daily.    ferrous gluconate (FERGON) 324 MG tablet TAKE 1 TABLET (324 MG TOTAL) BY MOUTH WITH LUNCH.    levothyroxine (SYNTHROID) 112 MCG tablet Take 1 tablet (112 mcg total) by mouth before breakfast.    mexiletine (MEXITIL) 250 MG Cap Take 1 capsule (250 mg total) by mouth every 8 (eight) hours.    pantoprazole (PROTONIX) 40 MG tablet TAKE 1 TABLET (40 MG TOTAL) BY MOUTH DAILY WITH LUNCH.    potassium chloride SA (K-DUR,KLOR-CON) 20 MEQ tablet Take 1 tablet (20 mEq total) by mouth 2 (two) times daily.    potassium chloride SA (K-DUR,KLOR-CON) 20 MEQ tablet TAKE 1 TABLET BY MOUTH TWICE A DAY    sildenafiL (VIAGRA) 100 MG tablet Take 1 tablet (100 mg total) by mouth daily as needed for Erectile Dysfunction.    torsemide (DEMADEX) 20 MG Tab Take 1 tablet (20 mg total) by mouth once daily.    vitamin D (VITAMIN D3) 1000 units Tab Take 1,000 Units by mouth once daily.    warfarin (COUMADIN) 5 MG tablet TAKE 7.5 MG ORALLY DAILY EVERY  AND THURSDAY, TAKE 5 MG ORALLY DAILY ON OTHER DAYS    zolpidem (AMBIEN CR) 12.5 MG CR tablet Take 1 tablet (12.5 mg total) by mouth nightly as needed for Insomnia.     Family History       Problem Relation (Age of Onset)    Cancer Sister (54)    Coronary artery disease Father    Diabetes Father    Heart disease Father    Hypertension Father    No Known Problems Mother, Brother          Tobacco Use    Smoking status: Former Smoker     Packs/day: 1.00     Years: 31.00     Pack years: 31.00     Types: Cigarettes     Quit date: 2018     Years since quittin.4    Smokeless tobacco: Never Used    Tobacco comment: pt is quiting on his own - pt stated not qualified for program;  pt  quit on his own   Substance and Sexual Activity    Alcohol use: No     Alcohol/week: 0.0 standard drinks     Comment: quit    Drug use: No     Sexual activity: Yes     Partners: Female     Birth control/protection: None     Comment: 10/5/17  with same partner 7 years      Review of Systems   Constitutional:  Positive for activity change, appetite change and fatigue.   HENT: Negative.     Eyes: Negative.    Respiratory:  Positive for shortness of breath. Negative for cough and chest tightness.    Cardiovascular:  Positive for leg swelling. Negative for chest pain and palpitations.   Gastrointestinal:  Positive for abdominal pain, diarrhea, nausea and vomiting. Negative for blood in stool.   Endocrine: Negative.    Genitourinary: Negative.    Musculoskeletal: Negative.    Skin: Negative.    Allergic/Immunologic: Negative.    Neurological:  Positive for dizziness and light-headedness.   Hematological: Negative.    Psychiatric/Behavioral: Negative.     Objective:     Vital Signs (Most Recent):  Temp: 98 °F (36.7 °C) (06/27/22 1038)  Pulse: 91 (06/27/22 1151)  Resp: (!) 32 (06/27/22 1151)  BP: (S) (!) 96/0 (Doppler for LVAD) (06/27/22 1219)  SpO2:  (not reg) (06/27/22 1038)   Vital Signs (24h Range):  Temp:  [98 °F (36.7 °C)] 98 °F (36.7 °C)  Pulse:  [91-93] 91  Resp:  [20-36] 32  BP: (94-96)/(0) 96/0     Patient Vitals for the past 72 hrs (Last 3 readings):   Weight   06/27/22 1038 111.1 kg (245 lb)     Body mass index is 32.32 kg/m².    No intake or output data in the 24 hours ending 06/27/22 1459    Physical Exam  Constitutional:       General: He is not in acute distress.     Appearance: He is obese.      Comments: Pleasant middle age male   HENT:      Head: Normocephalic.      Mouth/Throat:      Mouth: Mucous membranes are moist.   Eyes:      Extraocular Movements: Extraocular movements intact.   Neck:      Comments: JVD 12-14 cm  Cardiovascular:      Rate and Rhythm: Normal rate and regular rhythm.      Comments: VAD hum appreciated  Pulses detected via doppler  Pulmonary:      Effort: No respiratory distress.      Breath sounds: Normal breath  sounds.      Comments: Mild tachypnea  Abdominal:      General: Bowel sounds are normal. There is no distension.      Palpations: Abdomen is soft.      Tenderness: There is abdominal tenderness.      Comments: Hepatomegaly measuring 16 cm   Musculoskeletal:      Cervical back: Neck supple.      Right lower leg: Edema present.      Left lower leg: Edema present.   Skin:     General: Skin is warm.   Neurological:      General: No focal deficit present.      Mental Status: He is oriented to person, place, and time.   Psychiatric:         Mood and Affect: Mood normal.         Behavior: Behavior normal.       Significant Labs:  CBC:  Recent Labs   Lab 06/27/22  1116 06/27/22  1125   WBC 10.06  --    RBC 4.73  --    HGB 12.6*  --    HCT 40.6 41     --    MCV 86  --    MCH 26.6*  --    MCHC 31.0*  --      BNP:  Recent Labs   Lab 06/27/22  1116   BNP 1,951*     CMP:  Recent Labs   Lab 06/27/22  1116   *   CALCIUM 10.1   ALBUMIN 3.7   PROT 8.5*   *   K 5.4*   CO2 17*      BUN 32*   CREATININE 2.5*   ALKPHOS 118   ALT 67*   AST 99*   BILITOT 2.1*      Coagulation:   Recent Labs   Lab 06/23/22  1400 06/27/22  1116   INR 2.9* 3.2*     LDH:  Recent Labs   Lab 06/27/22  1243   LDH 1,058*     Microbiology:  Microbiology Results (last 7 days)       ** No results found for the last 168 hours. **          I have reviewed all pertinent labs within the past 24 hours.    Diagnostic Results:  TTE 6/28/22  · There is an LVAD present. Base speed is 9800 RPMs. The pump type is a Heartmate II. Ramp study. At 9800 rpm the mitral valve leaflets fail to coapt and septum bows slightly to the right with intermittent aortic valve opening with LVEDd 9.3 cm, severe MR and mild AR. At 83735 rpm the septum appears more midline and LVIDd measures 9 cm with slightly reduced mitral regurgitation (better leaflet coaptation) but slightly worse AR (moderate).  · The left ventricle is severely enlarged with severely decreased  systolic function. The estimated ejection fraction is 10%.  · There is severe left ventricular global hypokinesis.  · Moderately reduced right ventricular systolic function.  · Left ventricular diastolic dysfunction.  · Severe mitral regurgitation.      TTE 5/19/2022  · There is an LVAD present. Base speed is 9800 RPMs. The pump type is a Heartmate III. The interventricular septum appears to bow into the right ventricle. The aortic valve does not open.  · The left ventricle is severely enlarged with severe eccentric hypertrophy and severely decreased systolic function.  · The estimated ejection fraction is 10%.  · There is severe left ventricular global hypokinesis.  · There is abnormal septal wall motion.  · Grade I left ventricular diastolic dysfunction.  · Severe left atrial enlargement.  · Normal right ventricular size with mildly reduced right ventricular systolic function.  · Moderate right atrial enlargement.  · Mild-to-moderate mitral regurgitation.  · Mild tricuspid regurgitation.  · Intermediate central venous pressure (8 mmHg).  · The estimated PA systolic pressure is 26 mmHg.  · The ascending aorta is dilated.      Assessment and Plan:     55 Y/O M with Hx of stage D HFrEF (EF 10%) due to NICM underwent HM2 implant 3/8/18 and exchange of outflow graft 3/9/18, Hx VT/VF s/p SICD 2014 presenting with gradual worsening shortness of breath associated with nonpleuritic, nonradiating bilateral 4/10 retrosternal chest pressure pain.  Symptoms began yesterday and have gradually worsened in the last 12 hours.  He does report going to a family picnic with increased consumption of chicken wings, ribs and other salty meat products.  Prior to symptom onset, he reports nausea, nonbloody diarrhea began yesterday and single episode of nonbloody nonbilious vomiting this morning. He also complains of dizziness, lightheadedness, and a mild HA. SOB worsened this morning prompting him to come to the ED.     In the ED, patient  was in respiratory distress requiring BiPAP. MAP 96 and started on Nitro gtt. Work-up revealed WBC 10, K 5.4, Cr 2.5 (baseline 1.9), BNP 1950 (baseline 200-300s), Bili 2.1, LDH 1058, and INR 3.2. Bedside TTE with severely reduced EF, AV not opening, septum bulging into RV. Given IV Lasix 80 mg x1 with only 100-200 mL UOP and dark in color. HM2 interrogated and flows have been 8.5-9 L/min with no alarms or power surges. Cardiology consulted in the ED, dicussed with HTS, and decision made to admit to ICU for further mgmt.       * Left ventricular assist device (LVAD) complication  -Usually an elevated LDH is a red flag in a patient with LVAD, especially HM2 given the risk of pump thrombosis. PI, power and flow are elevated when compared to prior clinic visits  -LDH is trending up (although trend could be confounded by covid infection)  -Haptoglobin low (although it has been chronically low)  -Direct and indirect wendy test are negative  -Plasma free hemoglobin elevated  -At this point we are concerned that he could have some degree of thrombosis, since adding a second antiplatelet agent didn't resolved it, Integrilin with a Heparin drip were started until we have lab confirmation that this issue that resolved (normalization of LDH etc)  -Integrilin at 1 mcg/kg/min  -Heparin drip  -Aspirin 325mg daily    Procedure: Device Interrogation Including analysis of device parameters  Current Settings: Ventricular Assist Device    TXP LVAD INTERROGATIONS 7/1/2022 7/1/2022 6/30/2022 6/30/2022 6/30/2022 6/30/2022 6/30/2022   Type - HeartMate II HeartMate II HeartMate II HeartMate II HeartMate II HeartMate II   Flow 7.0 7.7 7.6 5.7 5.6 5.8 6.0   Speed 9800 9800 9790 9800 9800 9800 9800   PI 2.5 2.6 2.7 2.6 2.5 2.6 2.9   Power (Jackson) 7.4 7.8 7.7 6.6 6.8 6.6 6.7   LSL 9400 - - - 9400 9400 9400   Pulsatility - Intermittent pulse Intermittent pulse - - - -       History of ventricular tachycardia  Patient experienced an episode of  VT on 6/30 and experienced an ICD shock thought to be related to hypokalemia (K of 3.1 on 6/30)  - Plan to aggressively replete K, keep K>4 and Mg>2  - Will continue mexiletine 25mg q8hrs and amiodarone 400mg daily    COVID-19  -Previously hypoxic, now off oxygen  -Refers chills, nausea and soft stools  -ID consulted, recommended Remdesivir, course to be completed today    amiodarone induced hypothyrodism  -Continue levothyroxine 112 mcg     LVAD (left ventricular assist device) present  Procedure: Device Interrogation Including analysis of device parameters  Current Settings: Ventricular Assist Device  Review of device function is stable    TXP LVAD INTERROGATIONS 7/1/2022 7/1/2022 6/30/2022 6/30/2022 6/30/2022 6/30/2022 6/30/2022   Type - HeartMate II HeartMate II HeartMate II HeartMate II HeartMate II HeartMate II   Flow 7.0 7.7 7.6 5.7 5.6 5.8 6.0   Speed 9800 9800 9790 9800 9800 9800 9800   PI 2.5 2.6 2.7 2.6 2.5 2.6 2.9   Power (Jackson) 7.4 7.8 7.7 6.6 6.8 6.6 6.7   LSL 9400 - - - 9400 9400 9400   Pulsatility - Intermittent pulse Intermittent pulse - - - -       CKD (chronic kidney disease), stage III  WAGNER on CKD  Creatinine is 2.5 today with baseline of 1.5-1.9.  - Avoiding nephrotoxic medications  - Continue to monitor  - Strict I/O's    Chronic combined systolic and diastolic heart failure  #ADHF  #NICMP  -Admitted with acute decompensated HF, warm and wet with presence of HM2 LVAD  -Was placed on lasix gtt but appeared euvolemic and lasix gtt was stopped  -Continue Amlodipine 10mg daily  -Holding Warfarin  -Speed echo at 9800 showed bowing to the [place] [id number], IVS was at midline, MR decreased but worse AR        Harry Canales MD  Heart Transplant  [facility] - Cardiology Stepdown

## 2022-07-01 NOTE — HOSPITAL COURSE
56-year-old  gentleman with previous HM2 (implanted 3/8/2018 as DT-VAD) admitted with COVID-19 PNA and AHRF complicated by pump thrombosis refractory to medical therapy (failed integrillin) status post HM3 pump exchange 7/13/2022. Post-op course complicated with worsening cardiogenic shock/RV failure requiring VA-ECMO successfully decannulated. Episodes of VT with ICD discharged 7/21 requiring amio and NSVT episodes requiring addition of lidocaine gtt.             Significant events:  7/20 BAL+ Klebsiella aerogenes pansensitive by bronch.  7/26 resp cx + Klebsiella aerogenes pansensitive, Blood cx NGTD, urine cx NGTD     7/27 Extubated  7/29 Re-intubated for hypercapneic respiratory failure, CT T/A/P, rule out   COVID, TPN, Epi added     7/31 DCCV Aflutter RVR => SR/AF  8/4 trach=> tx HTS  8/8 weaned off Epi  8/17 Transfer CSU

## 2022-07-01 NOTE — PROGRESS NOTES
"07/01/2022  Mejia Shane Jr    Current provider:  Antonio Hadley Jr.,*    Device interrogation:  TXP LVAD INTERROGATIONS 7/1/2022 6/30/2022 6/30/2022 6/30/2022 6/30/2022 6/30/2022 6/30/2022   Type HeartMate II HeartMate II HeartMate II HeartMate II HeartMate II HeartMate II HeartMate II   Flow 7.7 7.6 5.7 5.6 5.8 6.0 5.7   Speed 9800 9790 9800 9800 9800 9800 9800   PI 2.6 2.7 2.6 2.5 2.6 2.9 2.9   Power (Jackson) 7.8 7.7 6.6 6.8 6.6 6.7 6.7   LSL - - - 9400 9400 9400 9400   Pulsatility Intermittent pulse Intermittent pulse - - - - -          As floor rounding has been requested to be limited during COVID-19 patient quarantine by Infectious Disease and leadership, only "electronic" rounding has been performed on this mechanical assist device patient.  Electronic rounding includes verifying in Epic that current settings and measurements for the device have been documented appropriately and that they are within limits.  Additionally, this rounding verifies that the EQUIPMENT section of the VAD flowsheet is documented in Epic, specifically checking the presence of emergency equipment and controller self test.  Any discrepancies will be related to the care team.    For implantable VADs: Interrogation of Ventricular assist device was performed with analysis of device parameters and review of device function. I have personally reviewed the interrogation findings and agree with findings as stated.     In emergency, the nursing units have been notified to contact the perfusion department either by:  Calling [phone] from 630am to 4pm Mon thru Fri, or by contacting the hospital  from 4pm to 630am and on weekends and asking to speak with the perfusionist on call.    Mejia Shane Jr  8:50 AM  "

## 2022-07-01 NOTE — TELEPHONE ENCOUNTER
SW attempted to contact pt via telephone (c: [phone] and room number: 52081) re: possibly insurance problem. No answer on either line. Pt currently in isolation due to positive COVID-19 status on 6/27/2022. SW remains available and will f/u w/ pt at later date/time.

## 2022-07-01 NOTE — ASSESSMENT & PLAN NOTE
Patient experienced an episode of VT on 6/30 and experienced an ICD shock thought to be related to hypokalemia (K of 3.1 on 6/30)  - Plan to aggressively replete K, keep K>4 and Mg>2  - Will continue mexiletine 25mg q8hrs and amiodarone 400mg daily

## 2022-07-01 NOTE — NURSING
Verbal order from MD Harry Canales to save some urine in a sterile cup next time patient urinates. Will pass along to night shift.

## 2022-07-02 LAB
ALBUMIN SERPL BCP-MCNC: 3.2 G/DL (ref 3.5–5.2)
ALP SERPL-CCNC: 97 U/L (ref 55–135)
ALT SERPL W/O P-5'-P-CCNC: 67 U/L (ref 10–44)
ANION GAP SERPL CALC-SCNC: 12 MMOL/L (ref 8–16)
ANION GAP SERPL CALC-SCNC: 13 MMOL/L (ref 8–16)
APTT BLDCRRT: 41.3 SEC (ref 21–32)
APTT BLDCRRT: 41.9 SEC (ref 21–32)
APTT BLDCRRT: 42.4 SEC (ref 21–32)
AST SERPL-CCNC: 104 U/L (ref 10–40)
BASOPHILS # BLD AUTO: 0.02 K/UL (ref 0–0.2)
BASOPHILS NFR BLD: 0.3 % (ref 0–1.9)
BILIRUB SERPL-MCNC: 3.3 MG/DL (ref 0.1–1)
BUN SERPL-MCNC: 40 MG/DL (ref 6–20)
BUN SERPL-MCNC: 42 MG/DL (ref 6–20)
CALCIUM SERPL-MCNC: 10.2 MG/DL (ref 8.7–10.5)
CALCIUM SERPL-MCNC: 9.6 MG/DL (ref 8.7–10.5)
CHLORIDE SERPL-SCNC: 91 MMOL/L (ref 95–110)
CHLORIDE SERPL-SCNC: 91 MMOL/L (ref 95–110)
CO2 SERPL-SCNC: 29 MMOL/L (ref 23–29)
CO2 SERPL-SCNC: 30 MMOL/L (ref 23–29)
CREAT SERPL-MCNC: 2.1 MG/DL (ref 0.5–1.4)
CREAT SERPL-MCNC: 2.2 MG/DL (ref 0.5–1.4)
CRP SERPL-MCNC: 138.7 MG/L (ref 0–8.2)
DIFFERENTIAL METHOD: ABNORMAL
EOSINOPHIL # BLD AUTO: 0.1 K/UL (ref 0–0.5)
EOSINOPHIL NFR BLD: 1.1 % (ref 0–8)
ERYTHROCYTE [DISTWIDTH] IN BLOOD BY AUTOMATED COUNT: 14.9 % (ref 11.5–14.5)
EST. GFR  (AFRICAN AMERICAN): 37.6 ML/MIN/1.73 M^2
EST. GFR  (AFRICAN AMERICAN): 39.8 ML/MIN/1.73 M^2
EST. GFR  (NON AFRICAN AMERICAN): 32.5 ML/MIN/1.73 M^2
EST. GFR  (NON AFRICAN AMERICAN): 34.4 ML/MIN/1.73 M^2
GLUCOSE SERPL-MCNC: 106 MG/DL (ref 70–110)
GLUCOSE SERPL-MCNC: 95 MG/DL (ref 70–110)
HCT VFR BLD AUTO: 35.3 % (ref 40–54)
HGB BLD-MCNC: 10.9 G/DL (ref 14–18)
IMM GRANULOCYTES # BLD AUTO: 0.02 K/UL (ref 0–0.04)
IMM GRANULOCYTES NFR BLD AUTO: 0.3 % (ref 0–0.5)
INR PPP: 1.6 (ref 0.8–1.2)
LDH SERPL L TO P-CCNC: 1821 U/L (ref 110–260)
LYMPHOCYTES # BLD AUTO: 1 K/UL (ref 1–4.8)
LYMPHOCYTES NFR BLD: 13.6 % (ref 18–48)
MAGNESIUM SERPL-MCNC: 2.3 MG/DL (ref 1.6–2.6)
MCH RBC QN AUTO: 26.7 PG (ref 27–31)
MCHC RBC AUTO-ENTMCNC: 30.9 G/DL (ref 32–36)
MCV RBC AUTO: 87 FL (ref 82–98)
MONOCYTES # BLD AUTO: 1.3 K/UL (ref 0.3–1)
MONOCYTES NFR BLD: 17.8 % (ref 4–15)
NEUTROPHILS # BLD AUTO: 4.7 K/UL (ref 1.8–7.7)
NEUTROPHILS NFR BLD: 66.9 % (ref 38–73)
NRBC BLD-RTO: 0 /100 WBC
PLATELET # BLD AUTO: 204 K/UL (ref 150–450)
PMV BLD AUTO: 11.3 FL (ref 9.2–12.9)
POTASSIUM SERPL-SCNC: 3.5 MMOL/L (ref 3.5–5.1)
POTASSIUM SERPL-SCNC: 3.7 MMOL/L (ref 3.5–5.1)
POTASSIUM SERPL-SCNC: 3.8 MMOL/L (ref 3.5–5.1)
PROT SERPL-MCNC: 8 G/DL (ref 6–8.4)
PROTHROMBIN TIME: 16 SEC (ref 9–12.5)
RBC # BLD AUTO: 4.08 M/UL (ref 4.6–6.2)
SODIUM SERPL-SCNC: 133 MMOL/L (ref 136–145)
SODIUM SERPL-SCNC: 133 MMOL/L (ref 136–145)
WBC # BLD AUTO: 7.04 K/UL (ref 3.9–12.7)

## 2022-07-02 PROCEDURE — 63600175 PHARM REV CODE 636 W HCPCS: Performed by: STUDENT IN AN ORGANIZED HEALTH CARE EDUCATION/TRAINING PROGRAM

## 2022-07-02 PROCEDURE — 36620 INSERTION CATHETER ARTERY: CPT

## 2022-07-02 PROCEDURE — 83615 LACTATE (LD) (LDH) ENZYME: CPT | Performed by: STUDENT IN AN ORGANIZED HEALTH CARE EDUCATION/TRAINING PROGRAM

## 2022-07-02 PROCEDURE — 25000003 PHARM REV CODE 250: Performed by: INTERNAL MEDICINE

## 2022-07-02 PROCEDURE — 20000000 HC ICU ROOM

## 2022-07-02 PROCEDURE — 27000207 HC ISOLATION

## 2022-07-02 PROCEDURE — 25000003 PHARM REV CODE 250: Performed by: STUDENT IN AN ORGANIZED HEALTH CARE EDUCATION/TRAINING PROGRAM

## 2022-07-02 PROCEDURE — 99233 SBSQ HOSP IP/OBS HIGH 50: CPT | Mod: ,,, | Performed by: INTERNAL MEDICINE

## 2022-07-02 PROCEDURE — 80048 BASIC METABOLIC PNL TOTAL CA: CPT | Mod: XB | Performed by: INTERNAL MEDICINE

## 2022-07-02 PROCEDURE — 93750 INTERROGATION VAD IN PERSON: CPT | Mod: ,,, | Performed by: INTERNAL MEDICINE

## 2022-07-02 PROCEDURE — 84132 ASSAY OF SERUM POTASSIUM: CPT | Performed by: INTERNAL MEDICINE

## 2022-07-02 PROCEDURE — 36415 COLL VENOUS BLD VENIPUNCTURE: CPT | Performed by: INTERNAL MEDICINE

## 2022-07-02 PROCEDURE — 86140 C-REACTIVE PROTEIN: CPT | Performed by: STUDENT IN AN ORGANIZED HEALTH CARE EDUCATION/TRAINING PROGRAM

## 2022-07-02 PROCEDURE — 83735 ASSAY OF MAGNESIUM: CPT | Performed by: STUDENT IN AN ORGANIZED HEALTH CARE EDUCATION/TRAINING PROGRAM

## 2022-07-02 PROCEDURE — 80053 COMPREHEN METABOLIC PANEL: CPT | Performed by: STUDENT IN AN ORGANIZED HEALTH CARE EDUCATION/TRAINING PROGRAM

## 2022-07-02 PROCEDURE — 99499 UNLISTED E&M SERVICE: CPT | Mod: ,,, | Performed by: STUDENT IN AN ORGANIZED HEALTH CARE EDUCATION/TRAINING PROGRAM

## 2022-07-02 PROCEDURE — 99233 PR SUBSEQUENT HOSPITAL CARE,LEVL III: ICD-10-PCS | Mod: ,,, | Performed by: INTERNAL MEDICINE

## 2022-07-02 PROCEDURE — 27000248 HC VAD-ADDITIONAL DAY

## 2022-07-02 PROCEDURE — 85730 THROMBOPLASTIN TIME PARTIAL: CPT | Mod: 91 | Performed by: INTERNAL MEDICINE

## 2022-07-02 PROCEDURE — 85610 PROTHROMBIN TIME: CPT | Performed by: STUDENT IN AN ORGANIZED HEALTH CARE EDUCATION/TRAINING PROGRAM

## 2022-07-02 PROCEDURE — 63600175 PHARM REV CODE 636 W HCPCS: Performed by: INTERNAL MEDICINE

## 2022-07-02 PROCEDURE — 85025 COMPLETE CBC W/AUTO DIFF WBC: CPT | Performed by: STUDENT IN AN ORGANIZED HEALTH CARE EDUCATION/TRAINING PROGRAM

## 2022-07-02 PROCEDURE — 93750 PR INTERROGATE VENT ASSIST DEV, IN PERSON, W PHYSICIAN ANALYSIS: ICD-10-PCS | Mod: ,,, | Performed by: INTERNAL MEDICINE

## 2022-07-02 PROCEDURE — 99499 ARTERIAL LINE: ICD-10-PCS | Mod: ,,, | Performed by: STUDENT IN AN ORGANIZED HEALTH CARE EDUCATION/TRAINING PROGRAM

## 2022-07-02 RX ORDER — POTASSIUM CHLORIDE 20 MEQ/1
40 TABLET, EXTENDED RELEASE ORAL ONCE
Status: COMPLETED | OUTPATIENT
Start: 2022-07-02 | End: 2022-07-02

## 2022-07-02 RX ORDER — POTASSIUM CHLORIDE 20 MEQ/1
20 TABLET, EXTENDED RELEASE ORAL ONCE
Status: COMPLETED | OUTPATIENT
Start: 2022-07-02 | End: 2022-07-02

## 2022-07-02 RX ORDER — FUROSEMIDE 10 MG/ML
80 INJECTION INTRAMUSCULAR; INTRAVENOUS EVERY 8 HOURS
Status: DISCONTINUED | OUTPATIENT
Start: 2022-07-02 | End: 2022-07-02

## 2022-07-02 RX ORDER — POTASSIUM CHLORIDE 750 MG/1
10 CAPSULE, EXTENDED RELEASE ORAL ONCE
Status: DISCONTINUED | OUTPATIENT
Start: 2022-07-02 | End: 2022-07-02

## 2022-07-02 RX ADMIN — POTASSIUM CHLORIDE 40 MEQ: 1500 TABLET, EXTENDED RELEASE ORAL at 09:07

## 2022-07-02 RX ADMIN — EPTIFIBATIDE 1 MCG/KG/MIN: 0.75 INJECTION INTRAVENOUS at 06:07

## 2022-07-02 RX ADMIN — MEXILETINE HYDROCHLORIDE 250 MG: 250 CAPSULE ORAL at 06:07

## 2022-07-02 RX ADMIN — ACETAMINOPHEN 650 MG: 325 TABLET ORAL at 02:07

## 2022-07-02 RX ADMIN — LEVOTHYROXINE SODIUM 112 MCG: 112 TABLET ORAL at 06:07

## 2022-07-02 RX ADMIN — BUSPIRONE HYDROCHLORIDE 5 MG: 5 TABLET ORAL at 09:07

## 2022-07-02 RX ADMIN — PANTOPRAZOLE SODIUM 40 MG: 40 TABLET, DELAYED RELEASE ORAL at 02:07

## 2022-07-02 RX ADMIN — POTASSIUM CHLORIDE 40 MEQ: 1500 TABLET, EXTENDED RELEASE ORAL at 11:07

## 2022-07-02 RX ADMIN — ASPIRIN 325 MG: 325 TABLET, COATED ORAL at 09:07

## 2022-07-02 RX ADMIN — MEXILETINE HYDROCHLORIDE 250 MG: 250 CAPSULE ORAL at 02:07

## 2022-07-02 RX ADMIN — POTASSIUM CHLORIDE 20 MEQ: 1500 TABLET, EXTENDED RELEASE ORAL at 04:07

## 2022-07-02 RX ADMIN — BUSPIRONE HYDROCHLORIDE 5 MG: 5 TABLET ORAL at 02:07

## 2022-07-02 RX ADMIN — HEPARIN SODIUM 16 UNITS/KG/HR: 5000 INJECTION INTRAVENOUS; SUBCUTANEOUS at 06:07

## 2022-07-02 RX ADMIN — POTASSIUM CHLORIDE 20 MEQ: 1500 TABLET, EXTENDED RELEASE ORAL at 06:07

## 2022-07-02 RX ADMIN — ALPRAZOLAM 1 MG: 0.5 TABLET ORAL at 09:07

## 2022-07-02 RX ADMIN — FUROSEMIDE 80 MG: 10 INJECTION, SOLUTION INTRAMUSCULAR; INTRAVENOUS at 06:07

## 2022-07-02 RX ADMIN — AMIODARONE HYDROCHLORIDE 400 MG: 200 TABLET ORAL at 09:07

## 2022-07-02 RX ADMIN — AMLODIPINE BESYLATE 10 MG: 10 TABLET ORAL at 09:07

## 2022-07-02 RX ADMIN — ALPRAZOLAM 1 MG: 0.5 TABLET ORAL at 02:07

## 2022-07-02 RX ADMIN — ALLOPURINOL 100 MG: 100 TABLET ORAL at 09:07

## 2022-07-02 RX ADMIN — BUSPIRONE HYDROCHLORIDE 5 MG: 5 TABLET ORAL at 08:07

## 2022-07-02 RX ADMIN — MEXILETINE HYDROCHLORIDE 250 MG: 250 CAPSULE ORAL at 09:07

## 2022-07-02 RX ADMIN — ACETAMINOPHEN 650 MG: 325 TABLET ORAL at 08:07

## 2022-07-02 RX ADMIN — ZOLPIDEM TARTRATE 5 MG: 5 TABLET ORAL at 08:07

## 2022-07-02 NOTE — PROGRESS NOTES
[facility]  Heart Transplant  Progress Note    Patient Name: Tim Richards  MRN: [id number]  Admission Date: 6/27/2022  Hospital Length of Stay: 5 days  Attending Physician: Antonio Hadley Jr.,*  Primary Care Provider: Deyanira Booth MD  Principal Problem:Left ventricular assist device (LVAD) complication    Subjective:     Interval History: NAEON. Denies SOB,CP/Chest discomfort. LDH cont to uptrend. Currently at 1821 from 1755 yesterday. No VAD alarms overnight however power is currently at 7.8-8.      Review of Systems   Constitutional:  Positive for appetite change and fatigue.   HENT: Negative.     Eyes: Negative.    Respiratory:  Positive for shortness of breath. Negative for cough and chest tightness.    Cardiovascular:  Positive for leg swelling. Negative for chest pain and palpitations.   Gastrointestinal:  Positive for abdominal pain and nausea. Negative for blood in stool.   Endocrine: Negative.    Genitourinary: Negative.    Musculoskeletal: Negative.    Skin: Negative.    Allergic/Immunologic: Negative.    Neurological:  Negative for light-headedness.   Hematological: Negative.    Psychiatric/Behavioral: Negative.     Objective:     Vital Signs (Most Recent):  Temp: 97.9 °F (36.6 °C) (07/02/22 1100)  Pulse: 89 (07/02/22 1200)  Resp: 20 (07/02/22 1200)  BP: (!) 72/0 (07/02/22 1200)  SpO2: 96 % (07/02/22 1200)   Vital Signs (24h Range):  Temp:  [96.2 °F (35.7 °C)-98.1 °F (36.7 °C)] 97.9 °F (36.6 °C)  Pulse:  [81-90] 89  Resp:  [16-20] 20  SpO2:  [90 %-97 %] 96 %  BP: ()/(0-69) 72/0     Patient Vitals for the past 72 hrs (Last 3 readings):   Weight   07/02/22 0713 104.3 kg (229 lb 15 oz)   07/01/22 1211 101.6 kg (224 lb)   07/01/22 0700 101.8 kg (224 lb 6.9 oz)       Body mass index is 30.34 kg/m².      Intake/Output Summary (Last 24 hours) at 7/2/2022 1254  Last data filed at 7/2/2022 1230  Gross per 24 hour   Intake 1577.5 ml   Output 1500 ml   Net 77.5 ml        Physical Exam  Constitutional:       General: He is not in acute distress.     Appearance: He is obese.      Comments: Pleasant middle age male   HENT:      Head: Normocephalic.      Mouth/Throat:      Mouth: Mucous membranes are moist.   Eyes:      Extraocular Movements: Extraocular movements intact.   Neck:      Comments: JVP 3 fingers above the level of clavicle.   Cardiovascular:      Rate and Rhythm: Normal rate and regular rhythm.      Comments: VAD hum appreciated  Pulses detected via doppler  Pulmonary:      Effort: No respiratory distress.      Breath sounds: Normal breath sounds.      Comments: Mild tachypnea  Abdominal:      General: Bowel sounds are normal. There is no distension.      Palpations: Abdomen is soft.      Tenderness: There is no abdominal tenderness.      Comments: Hepatomegaly measuring 16 cm   Musculoskeletal:      Cervical back: Neck supple.      Right lower leg: Edema present.      Left lower leg: Edema present.   Skin:     General: Skin is warm.   Neurological:      General: No focal deficit present.      Mental Status: He is oriented to person, place, and time.   Psychiatric:         Mood and Affect: Mood normal.         Behavior: Behavior normal.       Significant Labs:  CBC:  Recent Labs   Lab 06/30/22 0445 07/01/22  0436 07/02/22  0649   WBC 8.62 7.80 7.04   RBC 4.20* 4.50* 4.08*   HGB 11.2* 11.7* 10.9*   HCT 34.9* 37.7* 35.3*    211 204   MCV 83 84 87   MCH 26.7* 26.0* 26.7*   MCHC 32.1 31.0* 30.9*       BNP:  Recent Labs   Lab 06/27/22  1116 06/29/22 0448   BNP 1,951* 1,292*       CMP:  Recent Labs   Lab 06/30/22 0445 06/30/22  2037 07/01/22  0436 07/01/22  1338 07/02/22  0648   GLU 95   < > 95 103 95   CALCIUM 10.0   < > 10.0 9.5 10.2   ALBUMIN 3.5  --  3.2*  --  3.2*   PROT 8.2  --  8.0  --  8.0      < > 136 137 133*   K 3.1*   < > 3.3* 3.9 3.5   CO2 31*   < > 30* 32* 30*   CL 91*   < > 92* 94* 91*   BUN 42*   < > 50* 49* 42*   CREATININE 2.4*   < > 2.5*  2.7* 2.2*   ALKPHOS 109  --  107  --  97   ALT 93*  --  75*  --  67*   *  --  106*  --  104*   BILITOT 2.9*  --  3.5*  --  3.3*    < > = values in this interval not displayed.        Coagulation:   Recent Labs   Lab 06/30/22  0445 06/30/22  0949 07/01/22  0436 07/01/22  1238 07/02/22  0231 07/02/22  0648 07/02/22  0649 07/02/22  1048   INR 2.0*  --  1.8*  --   --   --  1.6*  --    APTT  --    < > 38.0*   < > 42.4* 41.9*  --  41.3*    < > = values in this interval not displayed.       LDH:  Recent Labs   Lab 06/30/22  0445 07/01/22  0436 07/02/22  0648   LDH 1,501* 1,755* 1,821*       Microbiology:  Microbiology Results (last 7 days)       ** No results found for the last 168 hours. **          I have reviewed all pertinent labs within the past 24 hours.    Diagnostic Results:  TTE 6/28/22  · There is an LVAD present. Base speed is 9800 RPMs. The pump type is a Heartmate II. Ramp study. At 9800 rpm the mitral valve leaflets fail to coapt and septum bows slightly to the right with intermittent aortic valve opening with LVEDd 9.3 cm, severe MR and mild AR. At 45757 rpm the septum appears more midline and LVIDd measures 9 cm with slightly reduced mitral regurgitation (better leaflet coaptation) but slightly worse AR (moderate).  · The left ventricle is severely enlarged with severely decreased systolic function. The estimated ejection fraction is 10%.  · There is severe left ventricular global hypokinesis.  · Moderately reduced right ventricular systolic function.  · Left ventricular diastolic dysfunction.  · Severe mitral regurgitation.      TTE 5/19/2022  · There is an LVAD present. Base speed is 9800 RPMs. The pump type is a Heartmate III. The interventricular septum appears to bow into the right ventricle. The aortic valve does not open.  · The left ventricle is severely enlarged with severe eccentric hypertrophy and severely decreased systolic function.  · The estimated ejection fraction is 10%.  · There is  severe left ventricular global hypokinesis.  · There is abnormal septal wall motion.  · Grade I left ventricular diastolic dysfunction.  · Severe left atrial enlargement.  · Normal right ventricular size with mildly reduced right ventricular systolic function.  · Moderate right atrial enlargement.  · Mild-to-moderate mitral regurgitation.  · Mild tricuspid regurgitation.  · Intermediate central venous pressure (8 mmHg).  · The estimated PA systolic pressure is 26 mmHg.  · The ascending aorta is dilated.      Assessment and Plan:     57 Y/O M with Hx of stage D HFrEF (EF 10%) due to NICM underwent HM2 implant 3/8/18 and exchange of outflow graft 3/9/18, Hx VT/VF s/p SICD 2014 presenting with gradual worsening shortness of breath associated with nonpleuritic, nonradiating bilateral 4/10 retrosternal chest pressure pain.  Symptoms began yesterday and have gradually worsened in the last 12 hours.  He does report going to a family picnic with increased consumption of chicken wings, ribs and other salty meat products.  Prior to symptom onset, he reports nausea, nonbloody diarrhea began yesterday and single episode of nonbloody nonbilious vomiting this morning. He also complains of dizziness, lightheadedness, and a mild HA. SOB worsened this morning prompting him to come to the ED.     In the ED, patient was in respiratory distress requiring BiPAP. MAP 96 and started on Nitro gtt. Work-up revealed WBC 10, K 5.4, Cr 2.5 (baseline 1.9), BNP 1950 (baseline 200-300s), Bili 2.1, LDH 1058, and INR 3.2. Bedside TTE with severely reduced EF, AV not opening, septum bulging into RV. Given IV Lasix 80 mg x1 with only 100-200 mL UOP and dark in color. HM2 interrogated and flows have been 8.5-9 L/min with no alarms or power surges. Cardiology consulted in the ED, dicussed with HTS, and decision made to admit to ICU for further mgmt.       * Left ventricular assist device (LVAD) complication  -Usually an elevated LDH is a red flag in a  patient with LVAD, especially HM2 given the risk of pump thrombosis. PI, power and flow are elevated when compared to prior clinic visits  -LDH is trending up (although trend could be confounded by covid infection)  -Haptoglobin low (although it has been chronically low)  -Direct and indirect wendy test are negative  -Plasma free hemoglobin elevated  -At this point we are concerned that he could have some degree of thrombosis, since adding a second antiplatelet agent didn't resolved it, Integrilin with a Heparin drip were started until we have lab confirmation that this issue that resolved (normalization of LDH etc).  - on 7/2 patient stepped up to the ICU given inc VAD power and concern for pump failure. Plan for pump exchange- case discussed with CTS  -Integrilin at 1 mcg/kg/min  -Heparin drip  -Aspirin 325mg daily    Procedure: Device Interrogation Including analysis of device parameters  Current Settings: Ventricular Assist Device    TXP LVAD INTERROGATIONS 7/1/2022 7/1/2022 6/30/2022 6/30/2022 6/30/2022 6/30/2022 6/30/2022   Type - HeartMate II HeartMate II HeartMate II HeartMate II HeartMate II HeartMate II   Flow 7.0 7.7 7.6 5.7 5.6 5.8 6.0   Speed 9800 9800 9790 9800 9800 9800 9800   PI 2.5 2.6 2.7 2.6 2.5 2.6 2.9   Power (Jackson) 7.4 7.8 7.7 6.6 6.8 6.6 6.7   LSL 9400 - - - 9400 9400 9400   Pulsatility - Intermittent pulse Intermittent pulse - - - -       History of ventricular tachycardia  Patient experienced an episode of VT on 6/30 and experienced an ICD shock thought to be related to hypokalemia (K of 3.1 on 6/30)  - Plan to aggressively replete K, keep K>4 and Mg>2  - Will continue mexiletine 25mg q8hrs and amiodarone 400mg daily    COVID-19  -Previously hypoxic, now off oxygen  -Refers chills, nausea and soft stools  -ID consulted, recommended Remdesivir, course to be completed today    amiodarone induced hypothyrodism  -Continue levothyroxine 112 mcg     LVAD (left ventricular assist device)  present  Procedure: Device Interrogation Including analysis of device parameters  Current Settings: Ventricular Assist Device  Review of device function is stable    TXP LVAD INTERROGATIONS 7/1/2022 7/1/2022 6/30/2022 6/30/2022 6/30/2022 6/30/2022 6/30/2022   Type - HeartMate II HeartMate II HeartMate II HeartMate II HeartMate II HeartMate II   Flow 7.0 7.7 7.6 5.7 5.6 5.8 6.0   Speed 9800 9800 9790 9800 9800 9800 9800   PI 2.5 2.6 2.7 2.6 2.5 2.6 2.9   Power (Jackson) 7.4 7.8 7.7 6.6 6.8 6.6 6.7   LSL 9400 - - - 9400 9400 9400   Pulsatility - Intermittent pulse Intermittent pulse - - - -       CKD (chronic kidney disease), stage III  WAGNER on CKD  Creatinine is 2.5 today with baseline of 1.5-1.9.  - Avoiding nephrotoxic medications  - Continue to monitor  - Strict I/O's    Chronic combined systolic and diastolic heart failure  #ADHF  #NICMP  -Admitted with acute decompensated HF, warm and wet with presence of HM2 LVAD  -Was placed on lasix gtt but appeared euvolemic and lasix gtt was stopped  -Continue Amlodipine 10mg daily  -Holding Warfarin  -Speed echo at 9800 showed bowing to the right and severe [place], [place] [id number], IVS was at midline, MR decreased but worse AR        Juan Cedillo MD  Heart Transplant  [facility] - [facility]

## 2022-07-02 NOTE — PROCEDURES
"Tim Richards is a 56 y.o. male patient.    Temp: 98.1 °F (36.7 °C) (07/02/22 1500)  Pulse: 84 (07/02/22 1600)  Resp: 15 (07/02/22 1600)  BP: (!) 75/0 (07/02/22 1600)  SpO2: 95 % (07/02/22 1600)  Weight: 104.3 kg (229 lb 15 oz) (07/02/22 0713)  Height: 6' 1" (185.4 cm) (07/01/22 1211)    Central Line    Date/Time: 7/2/2022 5:28 PM  Performed by: Juan Cedillo MD  Authorized by: Juan Cedillo MD     Location procedure was performed:  [facility] CARDIAC ICU  Pre-operative diagnosis:  Pump thrombosis  Post-operative diagnosis:  Pump  thrombosis  Consent Done ?:  Yes  Time out complete?: Verified correct patient, procedure, equipment, staff, and site/side    Indications:  Med administration and hemodynamic monitoring  Preparation:  Skin prepped with ChloraPrep  Location:  Right internal jugular  Catheter type:  Triple lumen  Catheter size:  7 Fr  Ultrasound guidance: Yes    Manometry: Yes    Number of attempts:  1  Complications: No          TXP LVAD INTERROGATIONS 7/2/2022 7/2/2022 7/2/2022 7/2/2022 7/2/2022 7/2/2022 7/2/2022   Type HeartMate II HeartMate II HeartMate II HeartMate II HeartMate II HeartMate II HeartMate II   Flow 7.9 8 7.1 7.9 8.4 7.5 8.1   Speed 9800 9800 9800 9800 9800 9800 9800   PI 3.2 3.3 2.6 3 2.4 2.5 2.4   Power (Jackson) 7.9 7.9 7.4 7.8 8.1 7.6 8   LSL 9400 9400 9400 9400 9400 9400 9400   Pulsatility Intermittent pulse Intermittent pulse Intermittent pulse Intermittent pulse Intermittent pulse Intermittent pulse Intermittent pulse         7/2/2022  "

## 2022-07-02 NOTE — PLAN OF CARE
Pt cooperative with routine care and procedures. Pt VSS. Pt bed in lowest position. Call bell and urinal within reach. Pt remained free from s/s of distress and pain. Scheduled medications given. Questions answered. Airborne and droplet precautions maintained. Heparin going at 16 units/kg/hr. Eptifibatide 1mcg/kg/min 8.7ml/hr given. LVAD dressing change due 7/2. Next aptt draw due at 1000. 1 therapeutic result for aptt.     Problem: Adult Inpatient Plan of Care  Goal: Plan of Care Review  Outcome: Ongoing, Progressing  Flowsheets (Taken 7/2/2022 0612)  Plan of Care Reviewed With: patient  Goal: Patient-Specific Goal (Individualized)  Outcome: Ongoing, Progressing  Flowsheets (Taken 7/2/2022 0612)  Anxieties, Fears or Concerns: Concerned about discontinued potassium  Individualized Care Needs: Monitor s/s, VS, LVAD numbers  Patient-Specific Goals (Include Timeframe): To remain injury free before discharge  Goal: Absence of Hospital-Acquired Illness or Injury  Outcome: Ongoing, Progressing  Goal: Optimal Comfort and Wellbeing  Outcome: Ongoing, Progressing  Goal: Readiness for Transition of Care  Outcome: Ongoing, Progressing     Problem: Infection  Goal: Absence of Infection Signs and Symptoms  Outcome: Ongoing, Progressing     Problem: Neutropenia  Goal: Absence of Infection  Outcome: Ongoing, Progressing     Problem: Fall Injury Risk  Goal: Absence of Fall and Fall-Related Injury  Outcome: Ongoing, Progressing

## 2022-07-02 NOTE — PROGRESS NOTES
07/02/2022  Casper Harris    Current provider:  Antonio Hadley Jr.,*    Device interrogation:  TXP LVAD INTERROGATIONS 7/2/2022 7/2/2022 7/2/2022 7/2/2022 7/2/2022 7/2/2022 7/2/2022   Type HeartMate II HeartMate II HeartMate II HeartMate II HeartMate II HeartMate II HeartMate II   Flow 7.9 8 7.1 7.9 8.4 7.5 8.1   Speed 9800 9800 9800 9800 9800 9800 9800   PI 3.2 3.3 2.6 3 2.4 2.5 2.4   Power (Jackson) 7.9 7.9 7.4 7.8 8.1 7.6 8   LSL 9400 9400 9400 9400 9400 9400 9400   Pulsatility Intermittent pulse Intermittent pulse Intermittent pulse Intermittent pulse Intermittent pulse Intermittent pulse Intermittent pulse          Rounded on Tim Richards to ensure all mechanical assist device settings (IABP or VAD) were appropriate and all parameters were within limits.  I was able to ensure all back up equipment was present, the staff had no issues, and the Perfusion Department daily rounding was complete.      For implantable VADs: Interrogation of Ventricular assist device was performed with analysis of device parameters and review of device function. I have personally reviewed the interrogation findings and agree with findings as stated.     In emergency, the nursing units have been notified to contact the perfusion department either by:  Calling [phone] from 630am to 4pm Mon thru Fri, utilizing the On-Call Finder functionality of Epic and searching for Perfusion, or by contacting the hospital  from 4pm to 630am and on weekends and asking to speak with the perfusionist on call.    5:15 PM

## 2022-07-02 NOTE — PROCEDURES
"Tim Richards is a 56 y.o. male patient.    Temp: 98.1 °F (36.7 °C) (22 1500)  Pulse: 84 (22 1600)  Resp: 15 (22 1600)  BP: (!) 75/0 (22 1600)  SpO2: 95 % (22 1600)  Weight: 104.3 kg (229 lb 15 oz) (22 0713)  Height: 6' 1" (185.4 cm) (22 1211)    Arterial Line    Date/Time: 2022 5:26 PM  Location procedure was performed: [facility] CARDIAC ICU  Performed by: Juan Cedillo MD  Authorized by: Juan Cedillo MD   Pre-op Diagnosis: VAD thrombosis  Post-operative diagnosis: VAD thrombosis  Consent Done: Yes  Consent: Verbal consent obtained.  Consent given by: patient  Patient identity confirmed: , MRN and name  Indications: hemodynamic monitoring  Location: left radial    Anesthesia:  Local Anesthetic: lidocaine 1% without epinephrine  Andres's test normal: yes  Needle gauge: 20  Number of attempts: 3  Post-procedure: line sutured and dressing applied          TXP LVAD INTERROGATIONS 2022   Type HeartMate II HeartMate II HeartMate II HeartMate II HeartMate II HeartMate II HeartMate II   Flow 7.9 8 7.1 7.9 8.4 7.5 8.1   Speed 9800 9800 9800 9800 9800 9800 9800   PI 3.2 3.3 2.6 3 2.4 2.5 2.4   Power (Jackson) 7.9 7.9 7.4 7.8 8.1 7.6 8   LSL 9400 9400 9400 9400 9400 9400 9400   Pulsatility Intermittent pulse Intermittent pulse Intermittent pulse Intermittent pulse Intermittent pulse Intermittent pulse Intermittent pulse         2022  "

## 2022-07-02 NOTE — NURSING
1100: pt arrived to cardiac ICU 3091. Pt connected to cardiac monitor. Inventory completed on VAD equipement. Pt is AAOx4 on arrival. EKG complete on arrival to unit.     110: pt transferred to CT scan on cardiac monitor. Pt connected to battery and transferred.    11:48 pt returned from CT scan. Pt placed on wall connected for VAD. Batteries inventory. Pt tolerated transfer well.     1420: pt c./ of right sided upper back pain. Pt given tylenol for pain will continue to monitor.     1710: pt c. Of RUQ abd pain. EKG completed and sent. Dr. Martinez informed. VSS. Beasley are in the mid to high 7's, no flow alarms noted. Will continue to monitor.     CVP 10    Drips:  Heparin 16units/kg/hr  integrillin 1mcg/kg/hr

## 2022-07-02 NOTE — SUBJECTIVE & OBJECTIVE
Interval History: NAEON. Denies SOB,CP/Chest discomfort. LDH cont to uptrend. Currently at 1821 from 1755 yesterday. No VAD alarms overnight however power is currently at 7.8-8.      Review of Systems   Constitutional:  Positive for appetite change and fatigue.   HENT: Negative.     Eyes: Negative.    Respiratory:  Positive for shortness of breath. Negative for cough and chest tightness.    Cardiovascular:  Positive for leg swelling. Negative for chest pain and palpitations.   Gastrointestinal:  Positive for abdominal pain and nausea. Negative for blood in stool.   Endocrine: Negative.    Genitourinary: Negative.    Musculoskeletal: Negative.    Skin: Negative.    Allergic/Immunologic: Negative.    Neurological:  Negative for light-headedness.   Hematological: Negative.    Psychiatric/Behavioral: Negative.     Objective:     Vital Signs (Most Recent):  Temp: 97.9 °F (36.6 °C) (07/02/22 1100)  Pulse: 89 (07/02/22 1200)  Resp: 20 (07/02/22 1200)  BP: (!) 72/0 (07/02/22 1200)  SpO2: 96 % (07/02/22 1200)   Vital Signs (24h Range):  Temp:  [96.2 °F (35.7 °C)-98.1 °F (36.7 °C)] 97.9 °F (36.6 °C)  Pulse:  [81-90] 89  Resp:  [16-20] 20  SpO2:  [90 %-97 %] 96 %  BP: ()/(0-69) 72/0     Patient Vitals for the past 72 hrs (Last 3 readings):   Weight   07/02/22 0713 104.3 kg (229 lb 15 oz)   07/01/22 1211 101.6 kg (224 lb)   07/01/22 0700 101.8 kg (224 lb 6.9 oz)       Body mass index is 30.34 kg/m².      Intake/Output Summary (Last 24 hours) at 7/2/2022 1254  Last data filed at 7/2/2022 1230  Gross per 24 hour   Intake 1577.5 ml   Output 1500 ml   Net 77.5 ml       Physical Exam  Constitutional:       General: He is not in acute distress.     Appearance: He is obese.      Comments: Pleasant middle age male   HENT:      Head: Normocephalic.      Mouth/Throat:      Mouth: Mucous membranes are moist.   Eyes:      Extraocular Movements: Extraocular movements intact.   Neck:      Comments: JVP 3 fingers above the level of  clavicle.   Cardiovascular:      Rate and Rhythm: Normal rate and regular rhythm.      Comments: VAD hum appreciated  Pulses detected via doppler  Pulmonary:      Effort: No respiratory distress.      Breath sounds: Normal breath sounds.      Comments: Mild tachypnea  Abdominal:      General: Bowel sounds are normal. There is no distension.      Palpations: Abdomen is soft.      Tenderness: There is no abdominal tenderness.      Comments: Hepatomegaly measuring 16 cm   Musculoskeletal:      Cervical back: Neck supple.      Right lower leg: Edema present.      Left lower leg: Edema present.   Skin:     General: Skin is warm.   Neurological:      General: No focal deficit present.      Mental Status: He is oriented to person, place, and time.   Psychiatric:         Mood and Affect: Mood normal.         Behavior: Behavior normal.       Significant Labs:  CBC:  Recent Labs   Lab 06/30/22  0445 07/01/22  0436 07/02/22  0649   WBC 8.62 7.80 7.04   RBC 4.20* 4.50* 4.08*   HGB 11.2* 11.7* 10.9*   HCT 34.9* 37.7* 35.3*    211 204   MCV 83 84 87   MCH 26.7* 26.0* 26.7*   MCHC 32.1 31.0* 30.9*       BNP:  Recent Labs   Lab 06/27/22  1116 06/29/22  0448   BNP 1,951* 1,292*       CMP:  Recent Labs   Lab 06/30/22  0445 06/30/22  2037 07/01/22  0436 07/01/22  1338 07/02/22  0648   GLU 95   < > 95 103 95   CALCIUM 10.0   < > 10.0 9.5 10.2   ALBUMIN 3.5  --  3.2*  --  3.2*   PROT 8.2  --  8.0  --  8.0      < > 136 137 133*   K 3.1*   < > 3.3* 3.9 3.5   CO2 31*   < > 30* 32* 30*   CL 91*   < > 92* 94* 91*   BUN 42*   < > 50* 49* 42*   CREATININE 2.4*   < > 2.5* 2.7* 2.2*   ALKPHOS 109  --  107  --  97   ALT 93*  --  75*  --  67*   *  --  106*  --  104*   BILITOT 2.9*  --  3.5*  --  3.3*    < > = values in this interval not displayed.        Coagulation:   Recent Labs   Lab 06/30/22  0445 06/30/22  0949 07/01/22  0436 07/01/22  1238 07/02/22  0231 07/02/22  0648 07/02/22  0649 07/02/22  1048   INR 2.0*  --  1.8*   --   --   --  1.6*  --    APTT  --    < > 38.0*   < > 42.4* 41.9*  --  41.3*    < > = values in this interval not displayed.       LDH:  Recent Labs   Lab 06/30/22  0445 07/01/22  0436 07/02/22  0648   LDH 1,501* 1,755* 1,821*       Microbiology:  Microbiology Results (last 7 days)       ** No results found for the last 168 hours. **          I have reviewed all pertinent labs within the past 24 hours.    Diagnostic Results:  TTE 6/28/22  There is an LVAD present. Base speed is 9800 RPMs. The pump type is a Heartmate II. Ramp study. At 9800 rpm the mitral valve leaflets fail to coapt and septum bows slightly to the right with intermittent aortic valve opening with LVEDd 9.3 cm, severe MR and mild AR. At 39650 rpm the septum appears more midline and LVIDd measures 9 cm with slightly reduced mitral regurgitation (better leaflet coaptation) but slightly worse AR (moderate).  The left ventricle is severely enlarged with severely decreased systolic function. The estimated ejection fraction is 10%.  There is severe left ventricular global hypokinesis.  Moderately reduced right ventricular systolic function.  Left ventricular diastolic dysfunction.  Severe mitral regurgitation.      TTE 5/19/2022  There is an LVAD present. Base speed is 9800 RPMs. The pump type is a Heartmate III. The interventricular septum appears to bow into the right ventricle. The aortic valve does not open.  The left ventricle is severely enlarged with severe eccentric hypertrophy and severely decreased systolic function.  The estimated ejection fraction is 10%.  There is severe left ventricular global hypokinesis.  There is abnormal septal wall motion.  Grade I left ventricular diastolic dysfunction.  Severe left atrial enlargement.  Normal right ventricular size with mildly reduced right ventricular systolic function.  Moderate right atrial enlargement.  Mild-to-moderate mitral regurgitation.  Mild tricuspid regurgitation.  Intermediate central  venous pressure (8 mmHg).  The estimated PA systolic pressure is 26 mmHg.  The ascending aorta is dilated.

## 2022-07-03 LAB
ALBUMIN SERPL BCP-MCNC: 3.1 G/DL (ref 3.5–5.2)
ALLENS TEST: ABNORMAL
ALP SERPL-CCNC: 96 U/L (ref 55–135)
ALT SERPL W/O P-5'-P-CCNC: 58 U/L (ref 10–44)
ANION GAP SERPL CALC-SCNC: 10 MMOL/L (ref 8–16)
ANION GAP SERPL CALC-SCNC: 9 MMOL/L (ref 8–16)
APTT BLDCRRT: 41.5 SEC (ref 21–32)
AST SERPL-CCNC: 106 U/L (ref 10–40)
BASOPHILS # BLD AUTO: 0.01 K/UL (ref 0–0.2)
BASOPHILS NFR BLD: 0.2 % (ref 0–1.9)
BILIRUB SERPL-MCNC: 3.3 MG/DL (ref 0.1–1)
BUN SERPL-MCNC: 39 MG/DL (ref 6–20)
BUN SERPL-MCNC: 40 MG/DL (ref 6–20)
CALCIUM SERPL-MCNC: 10 MG/DL (ref 8.7–10.5)
CALCIUM SERPL-MCNC: 9.9 MG/DL (ref 8.7–10.5)
CHLORIDE SERPL-SCNC: 94 MMOL/L (ref 95–110)
CHLORIDE SERPL-SCNC: 96 MMOL/L (ref 95–110)
CO2 SERPL-SCNC: 27 MMOL/L (ref 23–29)
CO2 SERPL-SCNC: 29 MMOL/L (ref 23–29)
CREAT SERPL-MCNC: 2 MG/DL (ref 0.5–1.4)
CREAT SERPL-MCNC: 2.1 MG/DL (ref 0.5–1.4)
CRP SERPL-MCNC: 107.7 MG/L (ref 0–8.2)
DELSYS: ABNORMAL
DIFFERENTIAL METHOD: ABNORMAL
EOSINOPHIL # BLD AUTO: 0.1 K/UL (ref 0–0.5)
EOSINOPHIL NFR BLD: 1.1 % (ref 0–8)
ERYTHROCYTE [DISTWIDTH] IN BLOOD BY AUTOMATED COUNT: 14.8 % (ref 11.5–14.5)
EST. GFR  (AFRICAN AMERICAN): 39.8 ML/MIN/1.73 M^2
EST. GFR  (AFRICAN AMERICAN): 42.2 ML/MIN/1.73 M^2
EST. GFR  (NON AFRICAN AMERICAN): 34.4 ML/MIN/1.73 M^2
EST. GFR  (NON AFRICAN AMERICAN): 36.5 ML/MIN/1.73 M^2
GLUCOSE SERPL-MCNC: 118 MG/DL (ref 70–110)
GLUCOSE SERPL-MCNC: 98 MG/DL (ref 70–110)
HCO3 UR-SCNC: 34.3 MMOL/L (ref 24–28)
HCT VFR BLD AUTO: 33.6 % (ref 40–54)
HGB BLD-MCNC: 10.5 G/DL (ref 14–18)
IMM GRANULOCYTES # BLD AUTO: 0.03 K/UL (ref 0–0.04)
IMM GRANULOCYTES NFR BLD AUTO: 0.5 % (ref 0–0.5)
INR PPP: 1.4 (ref 0.8–1.2)
LDH SERPL L TO P-CCNC: 1806 U/L (ref 110–260)
LYMPHOCYTES # BLD AUTO: 0.9 K/UL (ref 1–4.8)
LYMPHOCYTES NFR BLD: 13.8 % (ref 18–48)
MAGNESIUM SERPL-MCNC: 2.1 MG/DL (ref 1.6–2.6)
MCH RBC QN AUTO: 26.6 PG (ref 27–31)
MCHC RBC AUTO-ENTMCNC: 31.3 G/DL (ref 32–36)
MCV RBC AUTO: 85 FL (ref 82–98)
MONOCYTES # BLD AUTO: 1.1 K/UL (ref 0.3–1)
MONOCYTES NFR BLD: 16.5 % (ref 4–15)
NEUTROPHILS # BLD AUTO: 4.3 K/UL (ref 1.8–7.7)
NEUTROPHILS NFR BLD: 67.9 % (ref 38–73)
NRBC BLD-RTO: 0 /100 WBC
PCO2 BLDA: 50.9 MMHG (ref 35–45)
PH SMN: 7.44 [PH] (ref 7.35–7.45)
PLATELET # BLD AUTO: 205 K/UL (ref 150–450)
PMV BLD AUTO: 11.1 FL (ref 9.2–12.9)
PO2 BLDA: 28 MMHG (ref 40–60)
POC BE: 10 MMOL/L
POC SATURATED O2: 54 % (ref 95–100)
POC TCO2: 36 MMOL/L (ref 24–29)
POTASSIUM SERPL-SCNC: 3.4 MMOL/L (ref 3.5–5.1)
POTASSIUM SERPL-SCNC: 3.7 MMOL/L (ref 3.5–5.1)
POTASSIUM SERPL-SCNC: 4 MMOL/L (ref 3.5–5.1)
POTASSIUM SERPL-SCNC: 4.3 MMOL/L (ref 3.5–5.1)
PROT SERPL-MCNC: 7.8 G/DL (ref 6–8.4)
PROTHROMBIN TIME: 14.8 SEC (ref 9–12.5)
RBC # BLD AUTO: 3.94 M/UL (ref 4.6–6.2)
SAMPLE: ABNORMAL
SITE: ABNORMAL
SODIUM SERPL-SCNC: 132 MMOL/L (ref 136–145)
SODIUM SERPL-SCNC: 133 MMOL/L (ref 136–145)
WBC # BLD AUTO: 6.38 K/UL (ref 3.9–12.7)

## 2022-07-03 PROCEDURE — 63600175 PHARM REV CODE 636 W HCPCS: Performed by: INTERNAL MEDICINE

## 2022-07-03 PROCEDURE — 27000248 HC VAD-ADDITIONAL DAY

## 2022-07-03 PROCEDURE — 80048 BASIC METABOLIC PNL TOTAL CA: CPT | Mod: XB | Performed by: INTERNAL MEDICINE

## 2022-07-03 PROCEDURE — 85730 THROMBOPLASTIN TIME PARTIAL: CPT | Performed by: INTERNAL MEDICINE

## 2022-07-03 PROCEDURE — 93750 INTERROGATION VAD IN PERSON: CPT | Mod: ,,, | Performed by: INTERNAL MEDICINE

## 2022-07-03 PROCEDURE — 25000003 PHARM REV CODE 250: Performed by: INTERNAL MEDICINE

## 2022-07-03 PROCEDURE — 25000003 PHARM REV CODE 250: Performed by: STUDENT IN AN ORGANIZED HEALTH CARE EDUCATION/TRAINING PROGRAM

## 2022-07-03 PROCEDURE — 20000000 HC ICU ROOM

## 2022-07-03 PROCEDURE — 99291 PR CRITICAL CARE, E/M 30-74 MINUTES: ICD-10-PCS | Mod: ,,, | Performed by: INTERNAL MEDICINE

## 2022-07-03 PROCEDURE — 94761 N-INVAS EAR/PLS OXIMETRY MLT: CPT

## 2022-07-03 PROCEDURE — 85610 PROTHROMBIN TIME: CPT | Performed by: STUDENT IN AN ORGANIZED HEALTH CARE EDUCATION/TRAINING PROGRAM

## 2022-07-03 PROCEDURE — 86140 C-REACTIVE PROTEIN: CPT | Performed by: STUDENT IN AN ORGANIZED HEALTH CARE EDUCATION/TRAINING PROGRAM

## 2022-07-03 PROCEDURE — 99900035 HC TECH TIME PER 15 MIN (STAT)

## 2022-07-03 PROCEDURE — 27000207 HC ISOLATION

## 2022-07-03 PROCEDURE — 83735 ASSAY OF MAGNESIUM: CPT | Performed by: STUDENT IN AN ORGANIZED HEALTH CARE EDUCATION/TRAINING PROGRAM

## 2022-07-03 PROCEDURE — 99291 CRITICAL CARE FIRST HOUR: CPT | Mod: ,,, | Performed by: INTERNAL MEDICINE

## 2022-07-03 PROCEDURE — 82803 BLOOD GASES ANY COMBINATION: CPT

## 2022-07-03 PROCEDURE — 80053 COMPREHEN METABOLIC PANEL: CPT | Performed by: STUDENT IN AN ORGANIZED HEALTH CARE EDUCATION/TRAINING PROGRAM

## 2022-07-03 PROCEDURE — 84132 ASSAY OF SERUM POTASSIUM: CPT | Mod: 91 | Performed by: INTERNAL MEDICINE

## 2022-07-03 PROCEDURE — 85025 COMPLETE CBC W/AUTO DIFF WBC: CPT | Performed by: STUDENT IN AN ORGANIZED HEALTH CARE EDUCATION/TRAINING PROGRAM

## 2022-07-03 PROCEDURE — 83615 LACTATE (LD) (LDH) ENZYME: CPT | Performed by: STUDENT IN AN ORGANIZED HEALTH CARE EDUCATION/TRAINING PROGRAM

## 2022-07-03 PROCEDURE — 93750 PR INTERROGATE VENT ASSIST DEV, IN PERSON, W PHYSICIAN ANALYSIS: ICD-10-PCS | Mod: ,,, | Performed by: INTERNAL MEDICINE

## 2022-07-03 RX ORDER — LIDOCAINE 50 MG/G
1 PATCH TOPICAL
Status: DISCONTINUED | OUTPATIENT
Start: 2022-07-03 | End: 2022-07-13

## 2022-07-03 RX ORDER — POTASSIUM CHLORIDE 20 MEQ/1
40 TABLET, EXTENDED RELEASE ORAL ONCE
Status: COMPLETED | OUTPATIENT
Start: 2022-07-03 | End: 2022-07-03

## 2022-07-03 RX ORDER — POTASSIUM CHLORIDE 20 MEQ/1
40 TABLET, EXTENDED RELEASE ORAL ONCE
Status: DISCONTINUED | OUTPATIENT
Start: 2022-07-03 | End: 2022-07-03

## 2022-07-03 RX ORDER — POTASSIUM CHLORIDE 20 MEQ/1
60 TABLET, EXTENDED RELEASE ORAL ONCE
Status: COMPLETED | OUTPATIENT
Start: 2022-07-03 | End: 2022-07-03

## 2022-07-03 RX ORDER — FUROSEMIDE 10 MG/ML
40 INJECTION INTRAMUSCULAR; INTRAVENOUS ONCE
Status: DISCONTINUED | OUTPATIENT
Start: 2022-07-03 | End: 2022-07-03

## 2022-07-03 RX ORDER — FUROSEMIDE 10 MG/ML
80 INJECTION INTRAMUSCULAR; INTRAVENOUS ONCE
Status: COMPLETED | OUTPATIENT
Start: 2022-07-03 | End: 2022-07-03

## 2022-07-03 RX ADMIN — AMLODIPINE BESYLATE 10 MG: 10 TABLET ORAL at 08:07

## 2022-07-03 RX ADMIN — BUSPIRONE HYDROCHLORIDE 5 MG: 5 TABLET ORAL at 08:07

## 2022-07-03 RX ADMIN — PANTOPRAZOLE SODIUM 40 MG: 40 TABLET, DELAYED RELEASE ORAL at 12:07

## 2022-07-03 RX ADMIN — ACETAMINOPHEN 650 MG: 325 TABLET ORAL at 12:07

## 2022-07-03 RX ADMIN — MEXILETINE HYDROCHLORIDE 250 MG: 250 CAPSULE ORAL at 05:07

## 2022-07-03 RX ADMIN — AMIODARONE HYDROCHLORIDE 400 MG: 200 TABLET ORAL at 08:07

## 2022-07-03 RX ADMIN — MEXILETINE HYDROCHLORIDE 250 MG: 250 CAPSULE ORAL at 08:07

## 2022-07-03 RX ADMIN — ALLOPURINOL 100 MG: 100 TABLET ORAL at 08:07

## 2022-07-03 RX ADMIN — POTASSIUM CHLORIDE 40 MEQ: 1500 TABLET, EXTENDED RELEASE ORAL at 09:07

## 2022-07-03 RX ADMIN — MEXILETINE HYDROCHLORIDE 250 MG: 250 CAPSULE ORAL at 01:07

## 2022-07-03 RX ADMIN — BUSPIRONE HYDROCHLORIDE 5 MG: 5 TABLET ORAL at 03:07

## 2022-07-03 RX ADMIN — ASPIRIN 325 MG: 325 TABLET, COATED ORAL at 08:07

## 2022-07-03 RX ADMIN — EPTIFIBATIDE 1 MCG/KG/MIN: 0.75 INJECTION INTRAVENOUS at 04:07

## 2022-07-03 RX ADMIN — EPTIFIBATIDE 1 MCG/KG/MIN: 0.75 INJECTION INTRAVENOUS at 02:07

## 2022-07-03 RX ADMIN — POTASSIUM CHLORIDE 60 MEQ: 1500 TABLET, EXTENDED RELEASE ORAL at 05:07

## 2022-07-03 RX ADMIN — LIDOCAINE 1 PATCH: 50 PATCH CUTANEOUS at 12:07

## 2022-07-03 RX ADMIN — HEPARIN SODIUM 16 UNITS/KG/HR: 5000 INJECTION INTRAVENOUS; SUBCUTANEOUS at 11:07

## 2022-07-03 RX ADMIN — FUROSEMIDE 80 MG: 10 INJECTION, SOLUTION INTRAMUSCULAR; INTRAVENOUS at 10:07

## 2022-07-03 RX ADMIN — ZOLPIDEM TARTRATE 5 MG: 5 TABLET ORAL at 08:07

## 2022-07-03 RX ADMIN — LEVOTHYROXINE SODIUM 112 MCG: 112 TABLET ORAL at 05:07

## 2022-07-03 NOTE — NURSING
"0823: HM II power has been noted to go into the 8's, highest 8.2 with flows of 8.5 at times. Power does not sustain but has been steadily inc. Pt denies any complaints, denies headache or pain. Pt is AAOx4 with MAPs of 74. Dr. Martinez informed. Will continue to closely monitor at this time.     0845: Dr. Martinez at bedside CVP 12. HM II power 7.7, flow 7.7 MAP 75. Pt has no complaints. Will continue to monitor. Per MD, order for SvO2 at this time.     0915: Dr. Martinez informed SvO2 54%. Pt asleep at this time. Power 7.8 flows 7.7 MAP 75. Will continue to monitor.     0955: pt spit up a dime size clot of blood. C/ of bleeding gums, small blood clots noted to gums. Pt reports has been going on for days. Dr. Martinez informed. No new orders at this time. Will continue to monitor.     1332: pt noted to be mildly tachypnic HM II power 8.4, flow 8.3 MAP 75. Pt reports "wavelike palpitation" denies pain or headache. Pt reports lasted 2minutes and resolved. EKG sent. Dr. Martinez informed.     1350: CVP 10 and pt urinated about 375mL earlier during the shift. Dr. Martinez informed. Ordered to hold lasix and K doses at this time and recheck CVP around 1530.    1630: Dr. Nair at bedside. Informed of CVP 9. All questions and concerns answered by MD. Plan of care reviewed with patient and wife.   ..  CICU DAILY GOALS    7/3/2022    A: Awake    RASS: Goal - RASS Goal: 0-->alert and calm  Actual - RASS (Newman Agitation-Sedation Scale): 0-->alert and calm   Restraint necessity:    B: Breath   SBT: Not intubated   C: Coordinate A & B, analgesics/sedatives   Pain: managed    SAT: Not intubated  D: Delirium   CAM-ICU: Overall CAM-ICU: Negative  E: Early(intubated/ Progressive (non-intubated) Mobility   MOVE Screen: Pass   Activity: Activity Management: Rolling - L1  FAS: Feeding/Nutrition   Diet order: Diet/Nutrition Received: low saturated fat/low cholesterol, 2 gram sodium,   Fluid restriction: Fluid Requirement: 1500mL " FR  T: Thrombus   DVT prophylaxis: VTE Required Core Measure: Pharmacological prophylaxis initiated/maintained  H: HOB Elevation   Head of Bed (HOB) Positioning: HOB at 30-45 degrees  U: Ulcer Prophylaxis   GI: yes  G: Glucose control   managed    S: Skin   Bundle compliance: yes   Bathing/Skin Care: dressed/undressed, patient refused (refused bath and linen change today) Date: 7/3/2022  B: Bowel Function   no issues   I: Indwelling Catheters   [place] necessity:     CVC necessity: Yes   IPAD offered: No  D: De-escalation Antibx   No  Plan for the day   Monitor hemodynamics, draw blood, monitor LVAD numbers  Family/Goals of care/Code Status   Code Status: Full Code     CVP 9, 12, 10,10,9 SvO2 54%. Doppler pressures around the 80's. HM II flows ranged from  6.5-8 and power 7.5-8.3. urine output 625mL. adequate PO intake. 1 dose of tylenol given for back pain and lidocaine patch added to lower back for pain. VS and assessment per flow sheet, patient progressing towards goals as tolerated, plan of care reviewed with Tim Richards and family, all concerns addressed, will continue to monitor.    Drips:  Heparin 16units/kg/hr  Integrilin 1mcg/kg/hr

## 2022-07-03 NOTE — SUBJECTIVE & OBJECTIVE
Interval History: NAEON. Denies SOB,CP/Chest discomfort. LDH has plateud at 1806. Power remains elevated at 7.9-8  S/p Lasix X1 yesterday with urine output of 4.2 L, net -2.4 L since yesterday.     CVP 12 svo2 54 co 6.17 CI 2.72 svr 1024.3      Review of Systems   Constitutional:  Positive for appetite change and fatigue.   HENT: Negative.     Eyes: Negative.    Respiratory:  Negative for cough, chest tightness and shortness of breath.    Cardiovascular:  Positive for leg swelling. Negative for chest pain and palpitations.   Gastrointestinal:  Negative for abdominal pain, blood in stool and nausea.   Endocrine: Negative.    Genitourinary: Negative.    Musculoskeletal: Negative.    Skin: Negative.    Allergic/Immunologic: Negative.    Neurological:  Negative for light-headedness.   Hematological: Negative.    Psychiatric/Behavioral: Negative.     Objective:     Vital Signs (Most Recent):  Temp: 98.2 °F (36.8 °C) (07/03/22 1500)  Pulse: 82 (07/03/22 1700)  Resp: 20 (07/03/22 1700)  BP: (!) 82/0 (07/03/22 1500)  SpO2: 95 % (07/03/22 1700)   Vital Signs (24h Range):  Temp:  [97.7 °F (36.5 °C)-98.4 °F (36.9 °C)] 98.2 °F (36.8 °C)  Pulse:  [82-91] 82  Resp:  [20-34] 20  SpO2:  [95 %-97 %] 95 %  BP: (79-82)/(0) 82/0  Arterial Line BP: ()/(70-88) 84/73     Patient Vitals for the past 72 hrs (Last 3 readings):   Weight   07/03/22 0330 102.5 kg (225 lb 15.5 oz)   07/02/22 0713 104.3 kg (229 lb 15 oz)   07/01/22 1211 101.6 kg (224 lb)       Body mass index is 29.81 kg/m².      Intake/Output Summary (Last 24 hours) at 7/3/2022 1729  Last data filed at 7/3/2022 1700  Gross per 24 hour   Intake 2136.58 ml   Output 3600 ml   Net -1463.42 ml         Physical Exam  Constitutional:       General: He is not in acute distress.     Appearance: He is obese.      Comments: Pleasant middle age male   HENT:      Head: Normocephalic.      Mouth/Throat:      Mouth: Mucous membranes are moist.   Eyes:      Extraocular Movements:  Extraocular movements intact.   Neck:      Comments: JVP 3 fingers above the level of clavicle.   Cardiovascular:      Rate and Rhythm: Normal rate and regular rhythm.      Comments: VAD hum appreciated  Pulses detected via doppler  Pulmonary:      Effort: No respiratory distress.      Breath sounds: Normal breath sounds.      Comments: Mild tachypnea  Abdominal:      General: Bowel sounds are normal. There is no distension.      Palpations: Abdomen is soft.      Tenderness: There is no abdominal tenderness.   Musculoskeletal:      Cervical back: Neck supple.      Right lower leg: Edema present.      Left lower leg: Edema present.   Skin:     General: Skin is warm.   Neurological:      General: No focal deficit present.      Mental Status: He is oriented to person, place, and time.   Psychiatric:         Mood and Affect: Mood normal.         Behavior: Behavior normal.       Significant Labs:  CBC:  Recent Labs   Lab 07/01/22  0436 07/02/22  0649 07/03/22  0318   WBC 7.80 7.04 6.38   RBC 4.50* 4.08* 3.94*   HGB 11.7* 10.9* 10.5*   HCT 37.7* 35.3* 33.6*    204 205   MCV 84 87 85   MCH 26.0* 26.7* 26.6*   MCHC 31.0* 30.9* 31.3*       BNP:  Recent Labs   Lab 06/27/22  1116 06/29/22  0448   BNP 1,951* 1,292*       CMP:  Recent Labs   Lab 07/01/22  0436 07/01/22  1338 07/02/22  0648 07/02/22  1303 07/02/22  2102 07/03/22  0318 07/03/22  0904 07/03/22  1303 07/03/22  1533   GLU 95   < > 95  --  106 98  --   --  118*   CALCIUM 10.0   < > 10.2  --  9.6 9.9  --   --  10.0   ALBUMIN 3.2*  --  3.2*  --   --  3.1*  --   --   --    PROT 8.0  --  8.0  --   --  7.8  --   --   --       < > 133*  --  133* 133*  --   --  132*   K 3.3*   < > 3.5   < > 3.5  3.5 3.4* 3.7 4.0 4.3   CO2 30*   < > 30*  --  29 29  --   --  27   CL 92*   < > 91*  --  91* 94*  --   --  96   BUN 50*   < > 42*  --  40* 40*  --   --  39*   CREATININE 2.5*   < > 2.2*  --  2.1* 2.0*  --   --  2.1*   ALKPHOS 107  --  97  --   --  96  --   --   --     ALT 75*  --  67*  --   --  58*  --   --   --    *  --  104*  --   --  106*  --   --   --    BILITOT 3.5*  --  3.3*  --   --  3.3*  --   --   --     < > = values in this interval not displayed.        Coagulation:   Recent Labs   Lab 07/01/22  0436 07/01/22  1238 07/02/22  0648 07/02/22  0649 07/02/22  1048 07/03/22  0318   INR 1.8*  --   --  1.6*  --  1.4*   APTT 38.0*   < > 41.9*  --  41.3* 41.5*    < > = values in this interval not displayed.       LDH:  Recent Labs   Lab 07/01/22  0436 07/02/22  0648 07/03/22  0318   LDH 1,755* 1,821* 1,806*       Microbiology:  Microbiology Results (last 7 days)       ** No results found for the last 168 hours. **          I have reviewed all pertinent labs within the past 24 hours.    Diagnostic Results:  TTE 6/28/22  There is an LVAD present. Base speed is 9800 RPMs. The pump type is a Heartmate II. Ramp study. At 9800 rpm the mitral valve leaflets fail to coapt and septum bows slightly to the right with intermittent aortic valve opening with LVEDd 9.3 cm, severe MR and mild AR. At 55036 rpm the septum appears more midline and LVIDd measures 9 cm with slightly reduced mitral regurgitation (better leaflet coaptation) but slightly worse AR (moderate).  The left ventricle is severely enlarged with severely decreased systolic function. The estimated ejection fraction is 10%.  There is severe left ventricular global hypokinesis.  Moderately reduced right ventricular systolic function.  Left ventricular diastolic dysfunction.  Severe mitral regurgitation.      TTE 5/19/2022  There is an LVAD present. Base speed is 9800 RPMs. The pump type is a Heartmate III. The interventricular septum appears to bow into the right ventricle. The aortic valve does not open.  The left ventricle is severely enlarged with severe eccentric hypertrophy and severely decreased systolic function.  The estimated ejection fraction is 10%.  There is severe left ventricular global hypokinesis.  There  is abnormal septal wall motion.  Grade I left ventricular diastolic dysfunction.  Severe left atrial enlargement.  Normal right ventricular size with mildly reduced right ventricular systolic function.  Moderate right atrial enlargement.  Mild-to-moderate mitral regurgitation.  Mild tricuspid regurgitation.  Intermediate central venous pressure (8 mmHg).  The estimated PA systolic pressure is 26 mmHg.  The ascending aorta is dilated.

## 2022-07-03 NOTE — NURSING
Pt c/o bleeding gums.  While this RN in room, pt spat dime sized dark blood clot he stated came from his gums.  RN examined mouth and no active bleeding was noted at that time.  Pt states it happens at home with Warfarin.  MD Jama notified - no new orders.

## 2022-07-03 NOTE — ASSESSMENT & PLAN NOTE
#ADHF  #NICMP  -Admitted with acute decompensated HF, warm and wet with presence of HM2 LVAD  -Was placed on lasix gtt but appeared euvolemic and lasix gtt was stopped  -Continue Amlodipine 10mg daily  -Holding Warfarin, Heparin started instead  -Speed echo at 9800 showed bowing to the right [place] [id number], IVS was at midline, MR decreased but worse AR

## 2022-07-03 NOTE — PROGRESS NOTES
[facility] - Cardiac Intensive Care  Heart Transplant  Progress Note    Patient Name: Tim Richards  MRN: [id number]  Admission Date: 6/27/2022  Hospital Length of Stay: 6 days  Attending Physician: Antonio Hadley Jr.,*  Primary Care Provider: Deyaniar Booth MD  Principal Problem:Left ventricular assist device (LVAD) complication    Subjective:     Interval History: NAEON. Denies SOB,CP/Chest discomfort. LDH has plateud at 1806. Power remains elevated at 7.9-8  S/p Lasix X1 yesterday with urine output of 4.2 L, net -2.4 L since yesterday.     CVP 12 svo2 54 co 6.17 CI 2.72 svr 1024.3      Review of Systems   Constitutional:  Positive for appetite change and fatigue.   HENT: Negative.     Eyes: Negative.    Respiratory:  Negative for cough, chest tightness and shortness of breath.    Cardiovascular:  Positive for leg swelling. Negative for chest pain and palpitations.   Gastrointestinal:  Negative for abdominal pain, blood in stool and nausea.   Endocrine: Negative.    Genitourinary: Negative.    Musculoskeletal: Negative.    Skin: Negative.    Allergic/Immunologic: Negative.    Neurological:  Negative for light-headedness.   Hematological: Negative.    Psychiatric/Behavioral: Negative.     Objective:     Vital Signs (Most Recent):  Temp: 98.2 °F (36.8 °C) (07/03/22 1500)  Pulse: 82 (07/03/22 1700)  Resp: 20 (07/03/22 1700)  BP: (!) 82/0 (07/03/22 1500)  SpO2: 95 % (07/03/22 1700)   Vital Signs (24h Range):  Temp:  [97.7 °F (36.5 °C)-98.4 °F (36.9 °C)] 98.2 °F (36.8 °C)  Pulse:  [82-91] 82  Resp:  [20-34] 20  SpO2:  [95 %-97 %] 95 %  BP: (79-82)/(0) 82/0  Arterial Line BP: ()/(70-88) 84/73     Patient Vitals for the past 72 hrs (Last 3 readings):   Weight   07/03/22 0330 102.5 kg (225 lb 15.5 oz)   07/02/22 0713 104.3 kg (229 lb 15 oz)   07/01/22 1211 101.6 kg (224 lb)       Body mass index is 29.81 kg/m².      Intake/Output Summary (Last 24 hours) at 7/3/2022 8907  Last data filed at 7/3/2022  1700  Gross per 24 hour   Intake 2136.58 ml   Output 3600 ml   Net -1463.42 ml         Physical Exam  Constitutional:       General: He is not in acute distress.     Appearance: He is obese.      Comments: Pleasant middle age male   HENT:      Head: Normocephalic.      Mouth/Throat:      Mouth: Mucous membranes are moist.   Eyes:      Extraocular Movements: Extraocular movements intact.   Neck:      Comments: JVP 3 fingers above the level of clavicle.   Cardiovascular:      Rate and Rhythm: Normal rate and regular rhythm.      Comments: VAD hum appreciated  Pulses detected via doppler  Pulmonary:      Effort: No respiratory distress.      Breath sounds: Normal breath sounds.      Comments: Mild tachypnea  Abdominal:      General: Bowel sounds are normal. There is no distension.      Palpations: Abdomen is soft.      Tenderness: There is no abdominal tenderness.   Musculoskeletal:      Cervical back: Neck supple.      Right lower leg: Edema present.      Left lower leg: Edema present.   Skin:     General: Skin is warm.   Neurological:      General: No focal deficit present.      Mental Status: He is oriented to person, place, and time.   Psychiatric:         Mood and Affect: Mood normal.         Behavior: Behavior normal.       Significant Labs:  CBC:  Recent Labs   Lab 07/01/22  0436 07/02/22  0649 07/03/22  0318   WBC 7.80 7.04 6.38   RBC 4.50* 4.08* 3.94*   HGB 11.7* 10.9* 10.5*   HCT 37.7* 35.3* 33.6*    204 205   MCV 84 87 85   MCH 26.0* 26.7* 26.6*   MCHC 31.0* 30.9* 31.3*       BNP:  Recent Labs   Lab 06/27/22  1116 06/29/22  0448   BNP 1,951* 1,292*       CMP:  Recent Labs   Lab 07/01/22  0436 07/01/22  1338 07/02/22  0648 07/02/22  1303 07/02/22  2102 07/03/22  0318 07/03/22  0904 07/03/22  1303 07/03/22  1533   GLU 95   < > 95  --  106 98  --   --  118*   CALCIUM 10.0   < > 10.2  --  9.6 9.9  --   --  10.0   ALBUMIN 3.2*  --  3.2*  --   --  3.1*  --   --   --    PROT 8.0  --  8.0  --   --  7.8  --    --   --       < > 133*  --  133* 133*  --   --  132*   K 3.3*   < > 3.5   < > 3.5  3.5 3.4* 3.7 4.0 4.3   CO2 30*   < > 30*  --  29 29  --   --  27   CL 92*   < > 91*  --  91* 94*  --   --  96   BUN 50*   < > 42*  --  40* 40*  --   --  39*   CREATININE 2.5*   < > 2.2*  --  2.1* 2.0*  --   --  2.1*   ALKPHOS 107  --  97  --   --  96  --   --   --    ALT 75*  --  67*  --   --  58*  --   --   --    *  --  104*  --   --  106*  --   --   --    BILITOT 3.5*  --  3.3*  --   --  3.3*  --   --   --     < > = values in this interval not displayed.        Coagulation:   Recent Labs   Lab 07/01/22  0436 07/01/22  1238 07/02/22  0648 07/02/22  0649 07/02/22  1048 07/03/22  0318   INR 1.8*  --   --  1.6*  --  1.4*   APTT 38.0*   < > 41.9*  --  41.3* 41.5*    < > = values in this interval not displayed.       LDH:  Recent Labs   Lab 07/01/22  0436 07/02/22  0648 07/03/22  0318   LDH 1,755* 1,821* 1,806*       Microbiology:  Microbiology Results (last 7 days)       ** No results found for the last 168 hours. **          I have reviewed all pertinent labs within the past 24 hours.    Diagnostic Results:  TTE 6/28/22  · There is an LVAD present. Base speed is 9800 RPMs. The pump type is a Heartmate II. Ramp study. At 9800 rpm the mitral valve leaflets fail to coapt and septum bows slightly to the right with intermittent aortic valve opening with LVEDd 9.3 cm, severe MR and mild AR. At 44924 rpm the septum appears more midline and LVIDd measures 9 cm with slightly reduced mitral regurgitation (better leaflet coaptation) but slightly worse AR (moderate).  · The left ventricle is severely enlarged with severely decreased systolic function. The estimated ejection fraction is 10%.  · There is severe left ventricular global hypokinesis.  · Moderately reduced right ventricular systolic function.  · Left ventricular diastolic dysfunction.  · Severe mitral regurgitation.      TTE 5/19/2022  · There is an LVAD present. Base speed  is 9800 RPMs. The pump type is a Heartmate III. The interventricular septum appears to bow into the right ventricle. The aortic valve does not open.  · The left ventricle is severely enlarged with severe eccentric hypertrophy and severely decreased systolic function.  · The estimated ejection fraction is 10%.  · There is severe left ventricular global hypokinesis.  · There is abnormal septal wall motion.  · Grade I left ventricular diastolic dysfunction.  · Severe left atrial enlargement.  · Normal right ventricular size with mildly reduced right ventricular systolic function.  · Moderate right atrial enlargement.  · Mild-to-moderate mitral regurgitation.  · Mild tricuspid regurgitation.  · Intermediate central venous pressure (8 mmHg).  · The estimated PA systolic pressure is 26 mmHg.  · The ascending aorta is dilated.      Assessment and Plan:     57 Y/O M with Hx of stage D HFrEF (EF 10%) due to NICM underwent HM2 implant 3/8/18 and exchange of outflow graft 3/9/18, Hx VT/VF s/p SICD 2014 presenting with gradual worsening shortness of breath associated with nonpleuritic, nonradiating bilateral 4/10 retrosternal chest pressure pain.  Symptoms began yesterday and have gradually worsened in the last 12 hours.  He does report going to a family picnic with increased consumption of chicken wings, ribs and other salty meat products.  Prior to symptom onset, he reports nausea, nonbloody diarrhea began yesterday and single episode of nonbloody nonbilious vomiting this morning. He also complains of dizziness, lightheadedness, and a mild HA. SOB worsened this morning prompting him to come to the ED.     In the ED, patient was in respiratory distress requiring BiPAP. MAP 96 and started on Nitro gtt. Work-up revealed WBC 10, K 5.4, Cr 2.5 (baseline 1.9), BNP 1950 (baseline 200-300s), Bili 2.1, LDH 1058, and INR 3.2. Bedside TTE with severely reduced EF, AV not opening, septum bulging into RV. Given IV Lasix 80 mg x1 with only  100-200 mL UOP and dark in color. HM2 interrogated and flows have been 8.5-9 L/min with no alarms or power surges. Cardiology consulted in the ED, dicussed with HTS, and decision made to admit to ICU for further mgmt.       * Left ventricular assist device (LVAD) complication  -Usually an elevated LDH is a red flag in a patient with LVAD, especially HM2 given the risk of pump thrombosis. PI, power and flow are elevated when compared to prior clinic visits  -LDH is trending up (although trend could be confounded by covid infection)  -Haptoglobin low (although it has been chronically low)  -Direct and indirect wendy test are negative  -Plasma free hemoglobin elevated  -At this point we are concerned that he could have some degree of thrombosis, since adding a second antiplatelet agent didn't resolved it, Integrilin with a Heparin drip were started until we have lab confirmation that this issue that resolved (normalization of LDH etc)  - monitor volume status very closely  -Integrilin at 1 mcg/kg/min  -Heparin drip  -Aspirin 325mg daily    Procedure: Device Interrogation Including analysis of device parameters  Current Settings: Ventricular Assist Device    TXP LVAD INTERROGATIONS 7/1/2022 7/1/2022 6/30/2022 6/30/2022 6/30/2022 6/30/2022 6/30/2022   Type - HeartMate II HeartMate II HeartMate II HeartMate II HeartMate II HeartMate II   Flow 7.0 7.7 7.6 5.7 5.6 5.8 6.0   Speed 9800 9800 9790 9800 9800 9800 9800   PI 2.5 2.6 2.7 2.6 2.5 2.6 2.9   Power (Jackson) 7.4 7.8 7.7 6.6 6.8 6.6 6.7   LSL 9400 - - - 9400 9400 9400   Pulsatility - Intermittent pulse Intermittent pulse - - - -       History of ventricular tachycardia  Patient experienced an episode of VT on 6/30 and experienced an ICD shock thought to be related to hypokalemia (K of 3.1 on 6/30)  - Plan to aggressively replete K, keep K>4 and Mg>2  - Will continue mexiletine 25mg q8hrs and amiodarone 400mg daily    COVID-19  -Previously hypoxic, now off  oxygen  -Refers chills, nausea and soft stools  -ID consulted, recommended Remdesivir, course to be completed today    Elevated serum lactate dehydrogenase (LDH)  Concerned for pump thrombosis  Plan for pump exchange  Pt moved to the ICU for closer monitoring of hemodynamics    amiodarone induced hypothyrodism  -Continue levothyroxine 112 mcg     LVAD (left ventricular assist device) present  Procedure: Device Interrogation Including analysis of device parameters  Current Settings: Ventricular Assist Device  Review of device function is stable    TXP LVAD INTERROGATIONS 7/3/2022 7/3/2022 7/3/2022 7/3/2022 7/3/2022 7/3/2022 7/3/2022   Type HeartMate II HeartMate II HeartMate II HeartMate II HeartMate II HeartMate II HeartMate II   Flow 7.2 7.6 7.3 7 7.6 7.9 7.9   Speed 9800 9800 9800 9800 9800 9800 9800   PI 3.4 3.3 3.4 3 3.5 2.6 2.8   Power (Jackson) 7.6 7.6 7.7 7.4 7.7 7.8 7.8   LSL 9400 9400 9400 9400 9400 9400 9400   Pulsatility Intermittent pulse Intermittent pulse Intermittent pulse Intermittent pulse Intermittent pulse Intermittent pulse Intermittent pulse       CKD (chronic kidney disease), stage III  WAGNER on CKD  Creatinine is 2.5 today with baseline of 1.5-1.9.  - Avoiding nephrotoxic medications  - Continue to monitor  - Strict I/O's    Chronic combined systolic and diastolic heart failure  #ADHF  #NICMP  -Admitted with acute decompensated HF, warm and wet with presence of HM2 LVAD  -Was placed on lasix gtt but appeared euvolemic and lasix gtt was stopped  -Continue Amlodipine 10mg daily  -Holding Warfarin, Heparin started instead  -Speed echo at 9800 showed bowing to the right [place] [id number], IVS was at midline, MR decreased but worse AR        Juan Cedillo MD  Heart Transplant  [facility] - Cardiac Intensive Care

## 2022-07-03 NOTE — ASSESSMENT & PLAN NOTE
Procedure: Device Interrogation Including analysis of device parameters  Current Settings: Ventricular Assist Device  Review of device function is stable    TXP LVAD INTERROGATIONS 7/3/2022 7/3/2022 7/3/2022 7/3/2022 7/3/2022 7/3/2022 7/3/2022   Type HeartMate II HeartMate II HeartMate II HeartMate II HeartMate II HeartMate II HeartMate II   Flow 7.2 7.6 7.3 7 7.6 7.9 7.9   Speed 9800 9800 9800 9800 9800 9800 9800   PI 3.4 3.3 3.4 3 3.5 2.6 2.8   Power (Jackson) 7.6 7.6 7.7 7.4 7.7 7.8 7.8   LSL 9400 9400 9400 9400 9400 9400 9400   Pulsatility Intermittent pulse Intermittent pulse Intermittent pulse Intermittent pulse Intermittent pulse Intermittent pulse Intermittent pulse

## 2022-07-03 NOTE — PROCEDURES
"Tim Richards is a 56 y.o. male patient.    Temp: 98.4 °F (36.9 °C) (07/03/22 0700)  Pulse: 86 (07/03/22 0800)  Resp: 20 (07/03/22 0800)  BP: (!) 79/0 (07/03/22 0700)  SpO2: 95 % (07/03/22 0700)  Weight: 102.5 kg (225 lb 15.5 oz) (07/03/22 0330)  Height: 6' 1" (185.4 cm) (07/01/22 1211)    Procedures    TXP LVAD INTERROGATIONS 7/3/2022 7/3/2022 7/3/2022 7/3/2022 7/3/2022 7/3/2022 7/3/2022   Type HeartMate II HeartMate II HeartMate II HeartMate II HeartMate II HeartMate II HeartMate II   Flow 7.9 7.5 6.7 8 8.1 7.6 7.6   Speed 9800 9800 9800 9800 9800 9800 9800   PI 3.6 3.3 3.3 2.6 3.1 3.2 3.1   Power (Jackson) 7.9 7.7 7.2 7.8 7.9 7.7 7.5   LSL 9400 9400 9400 9400 9400 9400 9400   Pulsatility Intermittent pulse Intermittent pulse Intermittent pulse Intermittent pulse Intermittent pulse Intermittent pulse Intermittent pulse     Beasley continue to trend higher, LDH has plateaued and Cr 2.0    Interrogation of Ventricular assist device was performed with physician analysis of device parameters and review of device function. I have personally reviewed the interrogation findings and agree with findings as stated.       7/3/2022  "

## 2022-07-03 NOTE — PLAN OF CARE
Cardiac ICU Care Plan    ANGELITA  HM II power ranged 7.3-7.9 overnight.  CVPs 9, 11 and 11 mm Hg.    POC reviewed with Tim Richards and family. Questions and concerns addressed. No acute events today. Pt progressing toward goals. Will continue to monitor. See below and flowsheets for full assessment and VS info.       Neuro:  Deedee Coma Scale  Best Eye Response: 4-->(E4) spontaneous  Best Motor Response: 6-->(M6) obeys commands  Best Verbal Response: 5-->(V5) oriented  Deedee Coma Scale Score: 15  Assessment Qualifiers: patient not sedated/intubated, no eye obstruction present  Pupil PERRLA: yes    24 hr Temp:  [96.2 °F (35.7 °C)-98.4 °F (36.9 °C)]      CV:  Rhythm: normal sinus rhythm   DVT prophylaxis: VTE Required Core Measure: Pharmacological prophylaxis initiated/maintained    CVP (mean): 11 mmHg (07/03/22 0301)               Type: HeartMate II       Pulses  Right Radial Pulse: 1+ (weak)  Left Radial Pulse: 1+ (weak)  Right Dorsalis Pedis Pulse: 1+ (weak)  Left Dorsalis Pedis Pulse: 1+ (weak)  Right Posterior Tibial Pulse: 1+ (weak)  Left Posterior Tibial Pulse: 1+ (weak)    Resp:  O2 Device (Oxygen Therapy): room air  Flow (L/min): 2  Oxygen Concentration (%): 95    GI/:  GI prophylaxis: yes  Diet/Nutrition Received: low saturated fat/low cholesterol, 2 gram sodium, restrict fluids  Last Bowel Movement: 06/30/22      Intake/Output Summary (Last 24 hours) at 7/3/2022 0351  Last data filed at 7/3/2022 0300  Gross per 24 hour   Intake 1728.64 ml   Output 3875 ml   Net -2146.36 ml          Labs/Accuchecks:  Recent Labs   Lab 07/01/22  0436 07/02/22  0649 07/03/22  0318   WBC 7.80 7.04 6.38   RBC 4.50* 4.08* 3.94*   HGB 11.7* 10.9* 10.5*   HCT 37.7* 35.3* 33.6*    204 205      Recent Labs   Lab 06/30/22  0445 06/30/22  0949 07/01/22  0436 07/01/22  1238 07/02/22  0231 07/02/22  0648 07/02/22  0649 07/02/22  1048   INR 2.0*  --  1.8*  --   --   --  1.6*  --    APTT  --    < > 38.0*   < > 42.4* 41.9*   --  41.3*    < > = values in this interval not displayed.      Recent Labs     07/02/22  0648 07/02/22  1303 07/02/22  2102   *  --  133*   K 3.5   < > 3.5  3.5   CO2 30*  --  29   CL 91*  --  91*   BUN 42*  --  40*   CREATININE 2.2*  --  2.1*   ALKPHOS 97  --   --    ALT 67*  --   --    *  --   --    BILITOT 3.3*  --   --     < > = values in this interval not displayed.       Recent Labs   Lab 06/27/22  1116 06/27/22  1432   CPK  --  121   TROPONINI 0.083* 0.096*    No results for input(s): PH, PCO2, PO2, HCO3, POCSATURATED, BE in the last 72 hours.    Electrolytes: Electrolytes replaced  Accuchecks: none    Gtts/LDAs:   eptifibatide 1 mcg/kg/min (07/03/22 0300)    heparin (porcine) in D5W 16 Units/kg/hr (07/03/22 0300)       Lines/Drains/Airways       Central Venous Catheter Line  Duration             Percutaneous Central Line Insertion/Assessment - Triple Lumen  07/02/22 1522 right internal jugular <1 day              Arterial Line  Duration             Arterial Line 07/02/22 1545 Left Radial <1 day              Line  Duration                  VAD 03/08/18 1124 Left ventricular assist device HeartMate II 1577 days              Peripheral Intravenous Line  Duration                  Midline Catheter Insertion/Assessment  - Single Lumen 06/28/22 1027 Right cephalic vein (lateral side of arm) 20g x 8cm 4 days         Peripheral IV - Single Lumen 06/30/22 2116 20 G Anterior;Right Forearm 2 days                    Skin/Wounds  Bathing/Skin Care: electrode patches/site rotation;dressed/undressed;linen changed (07/02/22 1500)  Wounds: No  Wound care consulted: No   Problem: Adult Inpatient Plan of Care  Goal: Plan of Care Review  Outcome: Ongoing, Progressing  Flowsheets (Taken 7/3/2022 0351)  Plan of Care Reviewed With: patient

## 2022-07-04 LAB
ALBUMIN SERPL BCP-MCNC: 3.2 G/DL (ref 3.5–5.2)
ALLENS TEST: ABNORMAL
ALP SERPL-CCNC: 102 U/L (ref 55–135)
ALT SERPL W/O P-5'-P-CCNC: 57 U/L (ref 10–44)
ANION GAP SERPL CALC-SCNC: 10 MMOL/L (ref 8–16)
ANION GAP SERPL CALC-SCNC: 11 MMOL/L (ref 8–16)
APTT BLDCRRT: 39.1 SEC (ref 21–32)
AST SERPL-CCNC: 101 U/L (ref 10–40)
BASOPHILS # BLD AUTO: 0.02 K/UL (ref 0–0.2)
BASOPHILS NFR BLD: 0.3 % (ref 0–1.9)
BILIRUB SERPL-MCNC: 3 MG/DL (ref 0.1–1)
BUN SERPL-MCNC: 37 MG/DL (ref 6–20)
BUN SERPL-MCNC: 40 MG/DL (ref 6–20)
CALCIUM SERPL-MCNC: 10 MG/DL (ref 8.7–10.5)
CALCIUM SERPL-MCNC: 9.6 MG/DL (ref 8.7–10.5)
CHLORIDE SERPL-SCNC: 94 MMOL/L (ref 95–110)
CHLORIDE SERPL-SCNC: 95 MMOL/L (ref 95–110)
CO2 SERPL-SCNC: 27 MMOL/L (ref 23–29)
CO2 SERPL-SCNC: 28 MMOL/L (ref 23–29)
CREAT SERPL-MCNC: 2.1 MG/DL (ref 0.5–1.4)
CREAT SERPL-MCNC: 2.3 MG/DL (ref 0.5–1.4)
CRP SERPL-MCNC: 80.7 MG/L (ref 0–8.2)
DIFFERENTIAL METHOD: ABNORMAL
EOSINOPHIL # BLD AUTO: 0.1 K/UL (ref 0–0.5)
EOSINOPHIL NFR BLD: 0.9 % (ref 0–8)
ERYTHROCYTE [DISTWIDTH] IN BLOOD BY AUTOMATED COUNT: 14.9 % (ref 11.5–14.5)
EST. GFR  (AFRICAN AMERICAN): 35.6 ML/MIN/1.73 M^2
EST. GFR  (AFRICAN AMERICAN): 39.8 ML/MIN/1.73 M^2
EST. GFR  (NON AFRICAN AMERICAN): 30.8 ML/MIN/1.73 M^2
EST. GFR  (NON AFRICAN AMERICAN): 34.4 ML/MIN/1.73 M^2
GLUCOSE SERPL-MCNC: 100 MG/DL (ref 70–110)
GLUCOSE SERPL-MCNC: 107 MG/DL (ref 70–110)
HCO3 UR-SCNC: 32.8 MMOL/L (ref 24–28)
HCT VFR BLD AUTO: 34.4 % (ref 40–54)
HGB BLD-MCNC: 10.7 G/DL (ref 14–18)
IMM GRANULOCYTES # BLD AUTO: 0.03 K/UL (ref 0–0.04)
IMM GRANULOCYTES NFR BLD AUTO: 0.4 % (ref 0–0.5)
INR PPP: 1.3 (ref 0.8–1.2)
LDH SERPL L TO P-CCNC: 1899 U/L (ref 110–260)
LYMPHOCYTES # BLD AUTO: 0.9 K/UL (ref 1–4.8)
LYMPHOCYTES NFR BLD: 13.6 % (ref 18–48)
MAGNESIUM SERPL-MCNC: 2.1 MG/DL (ref 1.6–2.6)
MCH RBC QN AUTO: 26.8 PG (ref 27–31)
MCHC RBC AUTO-ENTMCNC: 31.1 G/DL (ref 32–36)
MCV RBC AUTO: 86 FL (ref 82–98)
MONOCYTES # BLD AUTO: 1.1 K/UL (ref 0.3–1)
MONOCYTES NFR BLD: 15.8 % (ref 4–15)
NEUTROPHILS # BLD AUTO: 4.8 K/UL (ref 1.8–7.7)
NEUTROPHILS NFR BLD: 69 % (ref 38–73)
NRBC BLD-RTO: 0 /100 WBC
PCO2 BLDA: 49.7 MMHG (ref 35–45)
PH SMN: 7.43 [PH] (ref 7.35–7.45)
PLATELET # BLD AUTO: 199 K/UL (ref 150–450)
PMV BLD AUTO: 10.5 FL (ref 9.2–12.9)
PO2 BLDA: 26 MMHG (ref 40–60)
POC BE: 8 MMOL/L
POC SATURATED O2: 48 % (ref 95–100)
POC TCO2: 34 MMOL/L (ref 24–29)
POTASSIUM SERPL-SCNC: 3.4 MMOL/L (ref 3.5–5.1)
POTASSIUM SERPL-SCNC: 3.9 MMOL/L (ref 3.5–5.1)
POTASSIUM SERPL-SCNC: 3.9 MMOL/L (ref 3.5–5.1)
POTASSIUM SERPL-SCNC: 4.2 MMOL/L (ref 3.5–5.1)
PROT SERPL-MCNC: 8.1 G/DL (ref 6–8.4)
PROTHROMBIN TIME: 13.7 SEC (ref 9–12.5)
RBC # BLD AUTO: 4 M/UL (ref 4.6–6.2)
SAMPLE: ABNORMAL
SITE: ABNORMAL
SODIUM SERPL-SCNC: 132 MMOL/L (ref 136–145)
SODIUM SERPL-SCNC: 133 MMOL/L (ref 136–145)
WBC # BLD AUTO: 6.91 K/UL (ref 3.9–12.7)

## 2022-07-04 PROCEDURE — 84132 ASSAY OF SERUM POTASSIUM: CPT | Mod: 91 | Performed by: INTERNAL MEDICINE

## 2022-07-04 PROCEDURE — 25000003 PHARM REV CODE 250: Performed by: STUDENT IN AN ORGANIZED HEALTH CARE EDUCATION/TRAINING PROGRAM

## 2022-07-04 PROCEDURE — 85025 COMPLETE CBC W/AUTO DIFF WBC: CPT | Performed by: STUDENT IN AN ORGANIZED HEALTH CARE EDUCATION/TRAINING PROGRAM

## 2022-07-04 PROCEDURE — 25000003 PHARM REV CODE 250: Performed by: INTERNAL MEDICINE

## 2022-07-04 PROCEDURE — 80053 COMPREHEN METABOLIC PANEL: CPT | Performed by: STUDENT IN AN ORGANIZED HEALTH CARE EDUCATION/TRAINING PROGRAM

## 2022-07-04 PROCEDURE — 83735 ASSAY OF MAGNESIUM: CPT | Performed by: STUDENT IN AN ORGANIZED HEALTH CARE EDUCATION/TRAINING PROGRAM

## 2022-07-04 PROCEDURE — 99900035 HC TECH TIME PER 15 MIN (STAT)

## 2022-07-04 PROCEDURE — 86140 C-REACTIVE PROTEIN: CPT | Performed by: STUDENT IN AN ORGANIZED HEALTH CARE EDUCATION/TRAINING PROGRAM

## 2022-07-04 PROCEDURE — 63600175 PHARM REV CODE 636 W HCPCS: Performed by: INTERNAL MEDICINE

## 2022-07-04 PROCEDURE — 27000248 HC VAD-ADDITIONAL DAY

## 2022-07-04 PROCEDURE — 99233 SBSQ HOSP IP/OBS HIGH 50: CPT | Mod: ,,, | Performed by: INTERNAL MEDICINE

## 2022-07-04 PROCEDURE — 83615 LACTATE (LD) (LDH) ENZYME: CPT | Performed by: STUDENT IN AN ORGANIZED HEALTH CARE EDUCATION/TRAINING PROGRAM

## 2022-07-04 PROCEDURE — 85610 PROTHROMBIN TIME: CPT | Performed by: STUDENT IN AN ORGANIZED HEALTH CARE EDUCATION/TRAINING PROGRAM

## 2022-07-04 PROCEDURE — 93750 PR INTERROGATE VENT ASSIST DEV, IN PERSON, W PHYSICIAN ANALYSIS: ICD-10-PCS | Mod: ,,, | Performed by: INTERNAL MEDICINE

## 2022-07-04 PROCEDURE — 63600175 PHARM REV CODE 636 W HCPCS: Performed by: STUDENT IN AN ORGANIZED HEALTH CARE EDUCATION/TRAINING PROGRAM

## 2022-07-04 PROCEDURE — 27000207 HC ISOLATION

## 2022-07-04 PROCEDURE — 80048 BASIC METABOLIC PNL TOTAL CA: CPT | Performed by: INTERNAL MEDICINE

## 2022-07-04 PROCEDURE — 94761 N-INVAS EAR/PLS OXIMETRY MLT: CPT

## 2022-07-04 PROCEDURE — 99233 PR SUBSEQUENT HOSPITAL CARE,LEVL III: ICD-10-PCS | Mod: ,,, | Performed by: INTERNAL MEDICINE

## 2022-07-04 PROCEDURE — 85730 THROMBOPLASTIN TIME PARTIAL: CPT | Performed by: INTERNAL MEDICINE

## 2022-07-04 PROCEDURE — 93750 INTERROGATION VAD IN PERSON: CPT | Mod: ,,, | Performed by: INTERNAL MEDICINE

## 2022-07-04 PROCEDURE — 20000000 HC ICU ROOM

## 2022-07-04 RX ORDER — FUROSEMIDE 10 MG/ML
80 INJECTION INTRAMUSCULAR; INTRAVENOUS ONCE
Status: COMPLETED | OUTPATIENT
Start: 2022-07-04 | End: 2022-07-04

## 2022-07-04 RX ORDER — POTASSIUM CHLORIDE 20 MEQ/1
40 TABLET, EXTENDED RELEASE ORAL ONCE
Status: COMPLETED | OUTPATIENT
Start: 2022-07-04 | End: 2022-07-04

## 2022-07-04 RX ADMIN — EPTIFIBATIDE 1 MCG/KG/MIN: 0.75 INJECTION INTRAVENOUS at 12:07

## 2022-07-04 RX ADMIN — MEXILETINE HYDROCHLORIDE 250 MG: 250 CAPSULE ORAL at 05:07

## 2022-07-04 RX ADMIN — POTASSIUM CHLORIDE 40 MEQ: 1500 TABLET, EXTENDED RELEASE ORAL at 09:07

## 2022-07-04 RX ADMIN — ACETAMINOPHEN 650 MG: 325 TABLET ORAL at 04:07

## 2022-07-04 RX ADMIN — BUSPIRONE HYDROCHLORIDE 5 MG: 5 TABLET ORAL at 02:07

## 2022-07-04 RX ADMIN — AMIODARONE HYDROCHLORIDE 400 MG: 200 TABLET ORAL at 08:07

## 2022-07-04 RX ADMIN — AMLODIPINE BESYLATE 10 MG: 10 TABLET ORAL at 08:07

## 2022-07-04 RX ADMIN — MEXILETINE HYDROCHLORIDE 250 MG: 250 CAPSULE ORAL at 02:07

## 2022-07-04 RX ADMIN — ZOLPIDEM TARTRATE 5 MG: 5 TABLET ORAL at 09:07

## 2022-07-04 RX ADMIN — ALLOPURINOL 100 MG: 100 TABLET ORAL at 08:07

## 2022-07-04 RX ADMIN — MEXILETINE HYDROCHLORIDE 250 MG: 250 CAPSULE ORAL at 09:07

## 2022-07-04 RX ADMIN — POTASSIUM CHLORIDE 40 MEQ: 1500 TABLET, EXTENDED RELEASE ORAL at 05:07

## 2022-07-04 RX ADMIN — BUSPIRONE HYDROCHLORIDE 5 MG: 5 TABLET ORAL at 09:07

## 2022-07-04 RX ADMIN — PANTOPRAZOLE SODIUM 40 MG: 40 TABLET, DELAYED RELEASE ORAL at 11:07

## 2022-07-04 RX ADMIN — HEPARIN SODIUM 16 UNITS/KG/HR: 5000 INJECTION INTRAVENOUS; SUBCUTANEOUS at 06:07

## 2022-07-04 RX ADMIN — ASPIRIN 325 MG: 325 TABLET, COATED ORAL at 08:07

## 2022-07-04 RX ADMIN — LEVOTHYROXINE SODIUM 112 MCG: 112 TABLET ORAL at 05:07

## 2022-07-04 RX ADMIN — BUSPIRONE HYDROCHLORIDE 5 MG: 5 TABLET ORAL at 08:07

## 2022-07-04 RX ADMIN — FUROSEMIDE 80 MG: 10 INJECTION, SOLUTION INTRAMUSCULAR; INTRAVENOUS at 03:07

## 2022-07-04 NOTE — ASSESSMENT & PLAN NOTE
#ADHF  #NICMP  -Admitted with acute decompensated HF presence of HM2 LVAD  -Was placed on lasix gtt but appeared euvolemic and lasix gtt was stopped  -Continue Amlodipine 10mg daily  -Holding Warfarin, Heparin started instead  -Speed echo at 9800 showed bowing to the [place] [id number], IVS was at midline, MR decreased but worse AR

## 2022-07-04 NOTE — ASSESSMENT & PLAN NOTE
WAGNER on CKD  Creatinine is 2.3 today with baseline of 1.5-1.9.  - Avoiding nephrotoxic medications  - Continue to monitor  - Strict I/O's

## 2022-07-04 NOTE — ASSESSMENT & PLAN NOTE
-Previously hypoxic, now off oxygen  -Refers chills, nausea and soft stools  -ID consulted, recommended Remdesivir, course completed

## 2022-07-04 NOTE — PROGRESS NOTES
[facility] - Cardiac Intensive Care  Heart Transplant  Progress Note    Patient Name: Tim Richards  MRN: [id number]  Admission Date: 6/27/2022  Hospital Length of Stay: 7 days  Attending Physician: Antonio Hadley Jr.,*  Primary Care Provider: Deyanira Booth MD  Principal Problem:Left ventricular assist device (LVAD) complication    Subjective:     Interval History: NAEON. Denies SOB,CP/Chest discomfort. Afebrile. HR 80-90. MAP 74-86, saturating 97% RA. Urine output overnight 2.6L, net -175 cc since yeterday and -9 L SA.   CVP 11 SVO2 48 CO 4.7 CI 2.1 SVR 1276  LD 1899 from 1806  Power currently at 7.7-7.9      Review of Systems   Constitutional:  Positive for appetite change and fatigue.   HENT: Negative.     Eyes: Negative.    Respiratory:  Negative for cough, chest tightness and shortness of breath.    Cardiovascular:  Positive for leg swelling. Negative for chest pain and palpitations.   Gastrointestinal:  Negative for abdominal pain, blood in stool and nausea.   Endocrine: Negative.    Genitourinary: Negative.    Musculoskeletal: Negative.    Skin: Negative.    Allergic/Immunologic: Negative.    Neurological:  Negative for light-headedness.   Hematological: Negative.    Psychiatric/Behavioral: Negative.     Objective:     Vital Signs (Most Recent):  Temp: 98.3 °F (36.8 °C) (07/04/22 1100)  Pulse: 80 (07/04/22 1200)  Resp: 18 (07/04/22 1200)  BP: (!) 78/0 (07/04/22 0700)  SpO2: 95 % (07/04/22 1200)   Vital Signs (24h Range):  Temp:  [97.7 °F (36.5 °C)-98.4 °F (36.9 °C)] 98.3 °F (36.8 °C)  Pulse:  [80-91] 80  Resp:  [18-37] 18  SpO2:  [95 %-100 %] 95 %  BP: (78-82)/(0) 78/0  Arterial Line BP: ()/(60-83) 77/69     Patient Vitals for the past 72 hrs (Last 3 readings):   Weight   07/04/22 0400 95.5 kg (210 lb 8.6 oz)   07/03/22 0330 102.5 kg (225 lb 15.5 oz)   07/02/22 0713 104.3 kg (229 lb 15 oz)       Body mass index is 27.78 kg/m².      Intake/Output Summary (Last 24 hours) at 7/4/2022 1222  Last  data filed at 7/4/2022 1200  Gross per 24 hour   Intake 1965.87 ml   Output 2275 ml   Net -309.13 ml         Physical Exam  Constitutional:       General: He is not in acute distress.     Appearance: He is obese.   HENT:      Head: Normocephalic.      Mouth/Throat:      Mouth: Mucous membranes are moist.   Eyes:      Extraocular Movements: Extraocular movements intact.   Cardiovascular:      Rate and Rhythm: Normal rate and regular rhythm.      Comments: VAD hum appreciated  Pulses detected via doppler  Pulmonary:      Effort: No respiratory distress.      Breath sounds: Normal breath sounds.      Comments: Mild tachypnea  Abdominal:      General: Bowel sounds are normal. There is no distension.      Palpations: Abdomen is soft.      Tenderness: There is no abdominal tenderness.   Musculoskeletal:      Cervical back: Neck supple.      Right lower leg: Edema present.      Left lower leg: Edema present.   Skin:     General: Skin is warm.   Neurological:      General: No focal deficit present.      Mental Status: He is oriented to person, place, and time.   Psychiatric:         Mood and Affect: Mood normal.         Behavior: Behavior normal.       Significant Labs:  CBC:  Recent Labs   Lab 07/02/22  0649 07/03/22  0318 07/04/22  0330   WBC 7.04 6.38 6.91   RBC 4.08* 3.94* 4.00*   HGB 10.9* 10.5* 10.7*   HCT 35.3* 33.6* 34.4*    205 199   MCV 87 85 86   MCH 26.7* 26.6* 26.8*   MCHC 30.9* 31.3* 31.1*       BNP:  Recent Labs   Lab 06/29/22  0448   BNP 1,292*       CMP:  Recent Labs   Lab 07/02/22  0648 07/02/22  1303 07/03/22  0318 07/03/22  0904 07/03/22  1533 07/04/22  0057 07/04/22  0330 07/04/22  0825   GLU 95   < > 98  --  118* 107 100  --    CALCIUM 10.2   < > 9.9  --  10.0 9.6 10.0  --    ALBUMIN 3.2*  --  3.1*  --   --   --  3.2*  --    PROT 8.0  --  7.8  --   --   --  8.1  --    *   < > 133*  --  132* 133* 132*  --    K 3.5   < > 3.4*   < > 4.3 3.9 3.4* 3.9   CO2 30*   < > 29  --  27 27 28  --    CL  91*   < > 94*  --  96 95 94*  --    BUN 42*   < > 40*  --  39* 40* 37*  --    CREATININE 2.2*   < > 2.0*  --  2.1* 2.1* 2.3*  --    ALKPHOS 97  --  96  --   --   --  102  --    ALT 67*  --  58*  --   --   --  57*  --    *  --  106*  --   --   --  101*  --    BILITOT 3.3*  --  3.3*  --   --   --  3.0*  --     < > = values in this interval not displayed.        Coagulation:   Recent Labs   Lab 07/02/22  0649 07/02/22  1048 07/03/22  0318 07/04/22  0330   INR 1.6*  --  1.4* 1.3*   APTT  --  41.3* 41.5* 39.1*       LDH:  Recent Labs   Lab 07/02/22  0648 07/03/22  0318 07/04/22  0330   LDH 1,821* 1,806* 1,899*       Microbiology:  Microbiology Results (last 7 days)       ** No results found for the last 168 hours. **          I have reviewed all pertinent labs within the past 24 hours.    Diagnostic Results:  TTE 6/28/22  · There is an LVAD present. Base speed is 9800 RPMs. The pump type is a Heartmate II. Ramp study. At 9800 rpm the mitral valve leaflets fail to coapt and septum bows slightly to the right with intermittent aortic valve opening with LVEDd 9.3 cm, severe MR and mild AR. At 59068 rpm the septum appears more midline and LVIDd measures 9 cm with slightly reduced mitral regurgitation (better leaflet coaptation) but slightly worse AR (moderate).  · The left ventricle is severely enlarged with severely decreased systolic function. The estimated ejection fraction is 10%.  · There is severe left ventricular global hypokinesis.  · Moderately reduced right ventricular systolic function.  · Left ventricular diastolic dysfunction.  · Severe mitral regurgitation.      TTE 5/19/2022  · There is an LVAD present. Base speed is 9800 RPMs. The pump type is a Heartmate III. The interventricular septum appears to bow into the right ventricle. The aortic valve does not open.  · The left ventricle is severely enlarged with severe eccentric hypertrophy and severely decreased systolic function.  · The estimated ejection  fraction is 10%.  · There is severe left ventricular global hypokinesis.  · There is abnormal septal wall motion.  · Grade I left ventricular diastolic dysfunction.  · Severe left atrial enlargement.  · Normal right ventricular size with mildly reduced right ventricular systolic function.  · Moderate right atrial enlargement.  · Mild-to-moderate mitral regurgitation.  · Mild tricuspid regurgitation.  · Intermediate central venous pressure (8 mmHg).  · The estimated PA systolic pressure is 26 mmHg.  · The ascending aorta is dilated.      Assessment and Plan:     57 Y/O M with Hx of stage D HFrEF (EF 10%) due to NICM underwent HM2 implant 3/8/18 and exchange of outflow graft 3/9/18, Hx VT/VF s/p SICD 2014 presenting with gradual worsening shortness of breath associated with nonpleuritic, nonradiating bilateral 4/10 retrosternal chest pressure pain.  Symptoms began yesterday and have gradually worsened in the last 12 hours.  He does report going to a family picnic with increased consumption of chicken wings, ribs and other salty meat products.  Prior to symptom onset, he reports nausea, nonbloody diarrhea began yesterday and single episode of nonbloody nonbilious vomiting this morning. He also complains of dizziness, lightheadedness, and a mild HA. SOB worsened this morning prompting him to come to the ED.     In the ED, patient was in respiratory distress requiring BiPAP. MAP 96 and started on Nitro gtt. Work-up revealed WBC 10, K 5.4, Cr 2.5 (baseline 1.9), BNP 1950 (baseline 200-300s), Bili 2.1, LDH 1058, and INR 3.2. Bedside TTE with severely reduced EF, AV not opening, septum bulging into RV. Given IV Lasix 80 mg x1 with only 100-200 mL UOP and dark in color. HM2 interrogated and flows have been 8.5-9 L/min with no alarms or power surges. Cardiology consulted in the ED, dicussed with HTS, and decision made to admit to ICU for further mgmt.       * Left ventricular assist device (LVAD) complication  -Usually an  elevated LDH is a red flag in a patient with LVAD, especially HM2 given the risk of pump thrombosis. PI, power and flow are elevated when compared to prior clinic visits  -LDH is trending up (although trend could be confounded by covid infection)  -Haptoglobin low (although it has been chronically low)  -Direct and indirect wendy test are negative  -Plasma free hemoglobin elevated  - concerned that he could have some degree of thrombosis, since adding a second antiplatelet agent didn't resolved it, Integrilin with a Heparin drip were started until we have lab confirmation that this issue that resolved (normalization of LDH etc)  -Integrilin at 1 mcg/kg/min  -Heparin drip  -Aspirin 325mg daily    Procedure: Device Interrogation Including analysis of device parameters  Current Settings: Ventricular Assist Device    TXP LVAD INTERROGATIONS 7/4/2022 7/4/2022 7/4/2022 7/4/2022 7/4/2022 7/4/2022 7/4/2022   Type HeartMate II HeartMate II HeartMate II HeartMate II HeartMate II HeartMate II HeartMate II   Flow 7.1 7.4 7.4 6.8 6.8 7.4 7.8   Speed 9800 9800 9800 9800 9800 9800 9800   PI 3.3 3.5 3.4 3.5 3.3 3.3 3.2   Power (Jackson) 7.3 7.6 7.4 7.2 7.7 7.7 7.7   LSL 9400 9400 9400 9400 9400 9400 9400   Pulsatility Intermittent pulse Intermittent pulse Intermittent pulse Intermittent pulse Intermittent pulse Intermittent pulse Intermittent pulse       History of ventricular tachycardia  Patient experienced an episode of VT on 6/30 and experienced an ICD shock thought to be related to hypokalemia (K of 3.1 on 6/30)  - Plan to aggressively replete K, keep K>4 and Mg>2  - Will continue mexiletine 25mg q8hrs and amiodarone 400mg daily    COVID-19  -Previously hypoxic, now off oxygen  -Refers chills, nausea and soft stools  -ID consulted, recommended Remdesivir, course completed    Elevated serum lactate dehydrogenase (LDH)  Concerned for pump thrombosis  Cont to trend  Plan for pump exchange    amiodarone induced  hypothyrodism  -Continue levothyroxine 112 mcg     LVAD (left ventricular assist device) present  Procedure: Device Interrogation Including analysis of device parameters  Current Settings: Ventricular Assist Device  Review of device function is stable    TXP LVAD INTERROGATIONS 7/3/2022 7/3/2022 7/3/2022 7/3/2022 7/3/2022 7/3/2022 7/3/2022   Type HeartMate II HeartMate II HeartMate II HeartMate II HeartMate II HeartMate II HeartMate II   Flow 7.2 7.6 7.3 7 7.6 7.9 7.9   Speed 9800 9800 9800 9800 9800 9800 9800   PI 3.4 3.3 3.4 3 3.5 2.6 2.8   Power (Jackson) 7.6 7.6 7.7 7.4 7.7 7.8 7.8   LSL 9400 9400 9400 9400 9400 9400 9400   Pulsatility Intermittent pulse Intermittent pulse Intermittent pulse Intermittent pulse Intermittent pulse Intermittent pulse Intermittent pulse       CKD (chronic kidney disease), stage III  WAGNER on CKD  Creatinine is 2.3 today with baseline of 1.5-1.9.  - Avoiding nephrotoxic medications  - Continue to monitor  - Strict I/O's    Chronic combined systolic and diastolic heart failure  #ADHF  #NICMP  -Admitted with acute decompensated HF presence of HM2 LVAD  -Was placed on lasix gtt which was stopped once euvolemia was achieved  - restared on 7/3- goal for net even!  -Continue Amlodipine 10mg daily  -Holding Warfarin, Heparin started instead  -Speed echo at 9800 showed bowing to the right [place] [id number], IVS was at midline, MR decreased but worse AR        Juan Cedillo MD  Heart Transplant  [facility] - Cardiac Intensive Care

## 2022-07-04 NOTE — ASSESSMENT & PLAN NOTE
-Usually an elevated LDH is a red flag in a patient with LVAD, especially HM2 given the risk of pump thrombosis. PI, power and flow are elevated when compared to prior clinic visits  -LDH is trending up (although trend could be confounded by covid infection)  -Haptoglobin low (although it has been chronically low)  -Direct and indirect wendy test are negative  -Plasma free hemoglobin elevated  - concerned that he could have some degree of thrombosis, since adding a second antiplatelet agent didn't resolved it, Integrilin with a Heparin drip were started until we have lab confirmation that this issue that resolved (normalization of LDH etc)  -Integrilin at 1 mcg/kg/min  -Heparin drip  -Aspirin 325mg daily    Procedure: Device Interrogation Including analysis of device parameters  Current Settings: Ventricular Assist Device    TXP LVAD INTERROGATIONS 7/4/2022 7/4/2022 7/4/2022 7/4/2022 7/4/2022 7/4/2022 7/4/2022   Type HeartMate II HeartMate II HeartMate II HeartMate II HeartMate II HeartMate II HeartMate II   Flow 7.1 7.4 7.4 6.8 6.8 7.4 7.8   Speed 9800 9800 9800 9800 9800 9800 9800   PI 3.3 3.5 3.4 3.5 3.3 3.3 3.2   Power (Jackson) 7.3 7.6 7.4 7.2 7.7 7.7 7.7   LSL 9400 9400 9400 9400 9400 9400 9400   Pulsatility Intermittent pulse Intermittent pulse Intermittent pulse Intermittent pulse Intermittent pulse Intermittent pulse Intermittent pulse

## 2022-07-04 NOTE — NURSING
0900: Dr. Martinez at bedside, CVP done with MD and RN, CVP 11, Svo2 48%. no new orders at this time. Will continue to monitor.     0930: pt had 1 BM and 1 unmeasured occurrence of urine output while on toilet.    ..  CICU DAILY GOALS     7/4/2022  A: Awake    RASS: Goal - RASS Goal: 0-->alert and calm  Actual - RASS (Newman Agitation-Sedation Scale): 0-->alert and calm   Restraint necessity:    B: Breath   SBT: Not intubated   C: Coordinate A & B, analgesics/sedatives   Pain: managed    SAT: Not intubated  D: Delirium   CAM-ICU: Overall CAM-ICU: Negative  E: Early(intubated/ Progressive (non-intubated) Mobility   MOVE Screen: Pass   Activity: Activity Management: Rolling - L1  FAS: Feeding/Nutrition   Diet order: Diet/Nutrition Received: low saturated fat/low cholesterol, 2 gram sodium,   Fluid restriction: Fluid Requirement: 1500mL FR  T: Thrombus   DVT prophylaxis: VTE Required Core Measure: Pharmacological prophylaxis initiated/maintained  H: HOB Elevation   Head of Bed (HOB) Positioning: HOB at 30-45 degrees  U: Ulcer Prophylaxis   GI: yes  G: Glucose control   managed    S: Skin   Bundle compliance: yes   Bathing/Skin Care: bath, complete, electrode patches/site rotation, dressed/undressed Date: 7/4/2022  B: Bowel Function   no issues   I: Indwelling Catheters   [place] necessity:     CVC necessity: Yes   IPAD offered: No  D: De-escalation Antibx   No  Plan for the day   Monitor hemodynamics, draw blood, monitor VAD n  Family/Goals of care/Code Status   Code Status: Full Code     No issues with VAD. Beasley ranged from 7-7.8, flows ranged from 6.5-8. No flow alarms from Heartmate II. Pt given K replacements during shift. Blood drawn during shift. CVP 8,11,9,10, SvO2 48%. Pt ambulated to toilet and had BM during shift. Pt sat in recliner during shift. Pt had good urine output after 80mg of lasix. No acute events throughout day, VS and assessment per flow sheet, patient progressing towards goals as tolerated,  plan of care reviewed with Tim Richards and family, all concerns addressed, will continue to monitor.    Drips:  Heparin 16units/kg/hr  Integrelin 1mcg/kg/hr

## 2022-07-04 NOTE — PLAN OF CARE
Cardiac ICU Care Plan    NAEON.  Diuresed overnight with appropriate UOP subsequently.  CVP 8, 10 and 7 mm Hg.  No complaints of bleeding gums this shift.   HM II power range 7.5-8, flows 7.2-8.1.      POC reviewed with Tim Richards and family. Questions and concerns addressed. No acute events today. Pt progressing toward goals. Will continue to monitor. See below and flowsheets for full assessment and VS info.       Neuro:  Deedee Coma Scale  Best Eye Response: 4-->(E4) spontaneous  Best Motor Response: 6-->(M6) obeys commands  Best Verbal Response: 5-->(V5) oriented  Deedee Coma Scale Score: 15  Assessment Qualifiers: patient not sedated/intubated, no eye obstruction present  Pupil PERRLA: yes    24 hr Temp:  [97.7 °F (36.5 °C)-98.4 °F (36.9 °C)]      CV:  Rhythm: normal sinus rhythm   DVT prophylaxis: VTE Required Core Measure: Pharmacological prophylaxis initiated/maintained    CVP (mean): 7 mmHg (07/04/22 0301)               Type: HeartMate II       Pulses  Right Radial Pulse: 1+ (weak)  Left Radial Pulse: 1+ (weak)  Right Dorsalis Pedis Pulse: 1+ (weak)  Left Dorsalis Pedis Pulse: 1+ (weak)  Right Posterior Tibial Pulse: 1+ (weak)  Left Posterior Tibial Pulse: 1+ (weak)    Resp:  O2 Device (Oxygen Therapy): room air  Flow (L/min): 2  Oxygen Concentration (%): 95    GI/:  GI prophylaxis: yes  Diet/Nutrition Received: low saturated fat/low cholesterol, 2 gram sodium, restrict fluids  Last Bowel Movement: 06/30/22      Intake/Output Summary (Last 24 hours) at 7/4/2022 0417  Last data filed at 7/4/2022 0400  Gross per 24 hour   Intake 2377.18 ml   Output 2600 ml   Net -222.82 ml          Labs/Accuchecks:  Recent Labs   Lab 07/02/22  0649 07/03/22  0318 07/04/22  0330   WBC 7.04 6.38 6.91   RBC 4.08* 3.94* 4.00*   HGB 10.9* 10.5* 10.7*   HCT 35.3* 33.6* 34.4*    205 199      Recent Labs   Lab 07/02/22  0649 07/02/22  1048 07/03/22  0318 07/04/22  0330   INR 1.6*  --  1.4* 1.3*   APTT  --  41.3*  41.5* 39.1*      Recent Labs     07/03/22  0318 07/03/22  0904 07/04/22  0057   *   < > 133*   K 3.4*   < > 3.9   CO2 29   < > 27   CL 94*   < > 95   BUN 40*   < > 40*   CREATININE 2.0*   < > 2.1*   ALKPHOS 96  --   --    ALT 58*  --   --    *  --   --    BILITOT 3.3*  --   --     < > = values in this interval not displayed.       Recent Labs   Lab 06/27/22  1116 06/27/22  1432   CPK  --  121   TROPONINI 0.083* 0.096*      Recent Labs     07/03/22  0904   PH 7.437   PCO2 50.9*   PO2 28*   HCO3 34.3*   POCSATURATED 54*   BE 10       Electrolytes: Electrolytes replaced  Accuchecks: none    Gtts/LDAs:   eptifibatide 1 mcg/kg/min (07/04/22 0400)    heparin (porcine) in D5W 16 Units/kg/hr (07/04/22 0400)       Lines/Drains/Airways       Central Venous Catheter Line  Duration             Percutaneous Central Line Insertion/Assessment - Triple Lumen  07/02/22 1522 right internal jugular 1 day              Arterial Line  Duration             Arterial Line 07/02/22 1545 Left Radial 1 day              Line  Duration                  VAD 03/08/18 1124 Left ventricular assist device HeartMate II 1578 days              Peripheral Intravenous Line  Duration                  Midline Catheter Insertion/Assessment  - Single Lumen 06/28/22 1027 Right cephalic vein (lateral side of arm) 20g x 8cm 5 days                    Skin/Wounds  Bathing/Skin Care: dressed/undressed;patient refused (refused bath and linen change today) (07/03/22 1500)  Wounds: no   Wound care consulted: No   Problem: Adult Inpatient Plan of Care  Goal: Plan of Care Review  Outcome: Ongoing, Progressing

## 2022-07-04 NOTE — SUBJECTIVE & OBJECTIVE
Interval History: NAEON. Denies SOB,CP/Chest discomfort. Afebrile. HR 80-90. MAP 74-86, saturating 97% RA. Urine output overnight 2.6L, net -175 cc since yeterday and -9 L SA.   CVP 11 SVO2 48 CO 4.7 CI 2.1 SVR 1276  LD 1899 from 1806  Power currently at 7.7-7.9      Review of Systems   Constitutional:  Positive for appetite change and fatigue.   HENT: Negative.     Eyes: Negative.    Respiratory:  Negative for cough, chest tightness and shortness of breath.    Cardiovascular:  Positive for leg swelling. Negative for chest pain and palpitations.   Gastrointestinal:  Negative for abdominal pain, blood in stool and nausea.   Endocrine: Negative.    Genitourinary: Negative.    Musculoskeletal: Negative.    Skin: Negative.    Allergic/Immunologic: Negative.    Neurological:  Negative for light-headedness.   Hematological: Negative.    Psychiatric/Behavioral: Negative.     Objective:     Vital Signs (Most Recent):  Temp: 98.3 °F (36.8 °C) (07/04/22 1100)  Pulse: 80 (07/04/22 1200)  Resp: 18 (07/04/22 1200)  BP: (!) 78/0 (07/04/22 0700)  SpO2: 95 % (07/04/22 1200)   Vital Signs (24h Range):  Temp:  [97.7 °F (36.5 °C)-98.4 °F (36.9 °C)] 98.3 °F (36.8 °C)  Pulse:  [80-91] 80  Resp:  [18-37] 18  SpO2:  [95 %-100 %] 95 %  BP: (78-82)/(0) 78/0  Arterial Line BP: ()/(60-83) 77/69     Patient Vitals for the past 72 hrs (Last 3 readings):   Weight   07/04/22 0400 95.5 kg (210 lb 8.6 oz)   07/03/22 0330 102.5 kg (225 lb 15.5 oz)   07/02/22 0713 104.3 kg (229 lb 15 oz)       Body mass index is 27.78 kg/m².      Intake/Output Summary (Last 24 hours) at 7/4/2022 1222  Last data filed at 7/4/2022 1200  Gross per 24 hour   Intake 1965.87 ml   Output 2275 ml   Net -309.13 ml         Physical Exam  Constitutional:       General: He is not in acute distress.     Appearance: He is obese.   HENT:      Head: Normocephalic.      Mouth/Throat:      Mouth: Mucous membranes are moist.   Eyes:      Extraocular Movements: Extraocular  movements intact.   Cardiovascular:      Rate and Rhythm: Normal rate and regular rhythm.      Comments: VAD hum appreciated  Pulses detected via doppler  Pulmonary:      Effort: No respiratory distress.      Breath sounds: Normal breath sounds.      Comments: Mild tachypnea  Abdominal:      General: Bowel sounds are normal. There is no distension.      Palpations: Abdomen is soft.      Tenderness: There is no abdominal tenderness.   Musculoskeletal:      Cervical back: Neck supple.      Right lower leg: Edema present.      Left lower leg: Edema present.   Skin:     General: Skin is warm.   Neurological:      General: No focal deficit present.      Mental Status: He is oriented to person, place, and time.   Psychiatric:         Mood and Affect: Mood normal.         Behavior: Behavior normal.       Significant Labs:  CBC:  Recent Labs   Lab 07/02/22  0649 07/03/22  0318 07/04/22  0330   WBC 7.04 6.38 6.91   RBC 4.08* 3.94* 4.00*   HGB 10.9* 10.5* 10.7*   HCT 35.3* 33.6* 34.4*    205 199   MCV 87 85 86   MCH 26.7* 26.6* 26.8*   MCHC 30.9* 31.3* 31.1*       BNP:  Recent Labs   Lab 06/29/22  0448   BNP 1,292*       CMP:  Recent Labs   Lab 07/02/22  0648 07/02/22  1303 07/03/22  0318 07/03/22  0904 07/03/22  1533 07/04/22  0057 07/04/22  0330 07/04/22  0825   GLU 95   < > 98  --  118* 107 100  --    CALCIUM 10.2   < > 9.9  --  10.0 9.6 10.0  --    ALBUMIN 3.2*  --  3.1*  --   --   --  3.2*  --    PROT 8.0  --  7.8  --   --   --  8.1  --    *   < > 133*  --  132* 133* 132*  --    K 3.5   < > 3.4*   < > 4.3 3.9 3.4* 3.9   CO2 30*   < > 29  --  27 27 28  --    CL 91*   < > 94*  --  96 95 94*  --    BUN 42*   < > 40*  --  39* 40* 37*  --    CREATININE 2.2*   < > 2.0*  --  2.1* 2.1* 2.3*  --    ALKPHOS 97  --  96  --   --   --  102  --    ALT 67*  --  58*  --   --   --  57*  --    *  --  106*  --   --   --  101*  --    BILITOT 3.3*  --  3.3*  --   --   --  3.0*  --     < > = values in this interval not  displayed.        Coagulation:   Recent Labs   Lab 07/02/22  0649 07/02/22  1048 07/03/22  0318 07/04/22  0330   INR 1.6*  --  1.4* 1.3*   APTT  --  41.3* 41.5* 39.1*       LDH:  Recent Labs   Lab 07/02/22  0648 07/03/22  0318 07/04/22  0330   LDH 1,821* 1,806* 1,899*       Microbiology:  Microbiology Results (last 7 days)       ** No results found for the last 168 hours. **          I have reviewed all pertinent labs within the past 24 hours.    Diagnostic Results:  TTE 6/28/22  There is an LVAD present. Base speed is 9800 RPMs. The pump type is a Heartmate II. Ramp study. At 9800 rpm the mitral valve leaflets fail to coapt and septum bows slightly to the right with intermittent aortic valve opening with LVEDd 9.3 cm, severe MR and mild AR. At 49316 rpm the septum appears more midline and LVIDd measures 9 cm with slightly reduced mitral regurgitation (better leaflet coaptation) but slightly worse AR (moderate).  The left ventricle is severely enlarged with severely decreased systolic function. The estimated ejection fraction is 10%.  There is severe left ventricular global hypokinesis.  Moderately reduced right ventricular systolic function.  Left ventricular diastolic dysfunction.  Severe mitral regurgitation.      TTE 5/19/2022  There is an LVAD present. Base speed is 9800 RPMs. The pump type is a Heartmate III. The interventricular septum appears to bow into the right ventricle. The aortic valve does not open.  The left ventricle is severely enlarged with severe eccentric hypertrophy and severely decreased systolic function.  The estimated ejection fraction is 10%.  There is severe left ventricular global hypokinesis.  There is abnormal septal wall motion.  Grade I left ventricular diastolic dysfunction.  Severe left atrial enlargement.  Normal right ventricular size with mildly reduced right ventricular systolic function.  Moderate right atrial enlargement.  Mild-to-moderate mitral regurgitation.  Mild  tricuspid regurgitation.  Intermediate central venous pressure (8 mmHg).  The estimated PA systolic pressure is 26 mmHg.  The ascending aorta is dilated.

## 2022-07-04 NOTE — PROGRESS NOTES
07/04/2022  Casper Harris    Current provider:  Antonio Hadley Jr.,*    Device interrogation:  TXP LVAD INTERROGATIONS 7/4/2022 7/4/2022 7/4/2022 7/4/2022 7/4/2022 7/4/2022 7/4/2022   Type HeartMate II HeartMate II HeartMate II HeartMate II HeartMate II HeartMate II HeartMate II   Flow 6.9 6.9 7.1 7.4 7.4 6.8 6.8   Speed 9800 9800 9800 9800 9800 9800 9800   PI 3.3 3.5 3.3 3.5 3.4 3.5 3.3   Power (Jackson) 7.3 7.3 7.3 7.6 7.4 7.2 7.7   LSL 9400 9400 9400 9400 9400 9400 9400   Pulsatility Intermittent pulse Intermittent pulse Intermittent pulse Intermittent pulse Intermittent pulse Intermittent pulse Intermittent pulse          Rounded on Tim Richards to ensure all mechanical assist device settings (IABP or VAD) were appropriate and all parameters were within limits.  I was able to ensure all back up equipment was present, the staff had no issues, and the Perfusion Department daily rounding was complete.      For implantable VADs: Interrogation of Ventricular assist device was performed with analysis of device parameters and review of device function. I have personally reviewed the interrogation findings and agree with findings as stated.     In emergency, the nursing units have been notified to contact the perfusion department either by:  Calling [phone] from 630am to 4pm Mon thru Fri, utilizing the On-Call Finder functionality of Epic and searching for Perfusion, or by contacting the hospital  from 4pm to 630am and on weekends and asking to speak with the perfusionist on call.    4:39 PM

## 2022-07-05 ENCOUNTER — TELEPHONE (OUTPATIENT)
Dept: TRANSPLANT | Facility: CLINIC | Age: 57
End: 2022-07-05
Payer: MEDICARE

## 2022-07-05 LAB
ALLENS TEST: ABNORMAL
ANION GAP SERPL CALC-SCNC: 10 MMOL/L (ref 8–16)
ANION GAP SERPL CALC-SCNC: 9 MMOL/L (ref 8–16)
APTT BLDCRRT: 39 SEC (ref 21–32)
BASOPHILS # BLD AUTO: 0.02 K/UL (ref 0–0.2)
BASOPHILS NFR BLD: 0.3 % (ref 0–1.9)
BUN SERPL-MCNC: 33 MG/DL (ref 6–20)
BUN SERPL-MCNC: 33 MG/DL (ref 6–20)
CALCIUM SERPL-MCNC: 10 MG/DL (ref 8.7–10.5)
CALCIUM SERPL-MCNC: 9.8 MG/DL (ref 8.7–10.5)
CHLORIDE SERPL-SCNC: 93 MMOL/L (ref 95–110)
CHLORIDE SERPL-SCNC: 97 MMOL/L (ref 95–110)
CO2 SERPL-SCNC: 25 MMOL/L (ref 23–29)
CO2 SERPL-SCNC: 29 MMOL/L (ref 23–29)
CREAT SERPL-MCNC: 1.9 MG/DL (ref 0.5–1.4)
CREAT SERPL-MCNC: 2.1 MG/DL (ref 0.5–1.4)
DELSYS: ABNORMAL
DIFFERENTIAL METHOD: ABNORMAL
EOSINOPHIL # BLD AUTO: 0.1 K/UL (ref 0–0.5)
EOSINOPHIL NFR BLD: 1.6 % (ref 0–8)
ERYTHROCYTE [DISTWIDTH] IN BLOOD BY AUTOMATED COUNT: 14.9 % (ref 11.5–14.5)
EST. GFR  (AFRICAN AMERICAN): 39.8 ML/MIN/1.73 M^2
EST. GFR  (AFRICAN AMERICAN): 44.9 ML/MIN/1.73 M^2
EST. GFR  (NON AFRICAN AMERICAN): 34.4 ML/MIN/1.73 M^2
EST. GFR  (NON AFRICAN AMERICAN): 38.8 ML/MIN/1.73 M^2
FIO2: 21
GLUCOSE SERPL-MCNC: 115 MG/DL (ref 70–110)
GLUCOSE SERPL-MCNC: 87 MG/DL (ref 70–110)
HCO3 UR-SCNC: 32.1 MMOL/L (ref 24–28)
HCT VFR BLD AUTO: 33.4 % (ref 40–54)
HGB BLD-MCNC: 10.4 G/DL (ref 14–18)
IMM GRANULOCYTES # BLD AUTO: 0.04 K/UL (ref 0–0.04)
IMM GRANULOCYTES NFR BLD AUTO: 0.6 % (ref 0–0.5)
LDH SERPL L TO P-CCNC: 1661 U/L (ref 110–260)
LYMPHOCYTES # BLD AUTO: 1.2 K/UL (ref 1–4.8)
LYMPHOCYTES NFR BLD: 18.2 % (ref 18–48)
MAGNESIUM SERPL-MCNC: 2 MG/DL (ref 1.6–2.6)
MAGNESIUM SERPL-MCNC: 2.1 MG/DL (ref 1.6–2.6)
MCH RBC QN AUTO: 26.9 PG (ref 27–31)
MCHC RBC AUTO-ENTMCNC: 31.1 G/DL (ref 32–36)
MCV RBC AUTO: 86 FL (ref 82–98)
MODE: ABNORMAL
MONOCYTES # BLD AUTO: 1.1 K/UL (ref 0.3–1)
MONOCYTES NFR BLD: 15.4 % (ref 4–15)
NEUTROPHILS # BLD AUTO: 4.3 K/UL (ref 1.8–7.7)
NEUTROPHILS NFR BLD: 63.9 % (ref 38–73)
NRBC BLD-RTO: 0 /100 WBC
PCO2 BLDA: 50.2 MMHG (ref 35–45)
PH SMN: 7.41 [PH] (ref 7.35–7.45)
PLATELET # BLD AUTO: 193 K/UL (ref 150–450)
PMV BLD AUTO: 11.2 FL (ref 9.2–12.9)
PO2 BLDA: 33 MMHG (ref 40–60)
POC BE: 8 MMOL/L
POC SATURATED O2: 63 % (ref 95–100)
POC TCO2: 34 MMOL/L (ref 24–29)
POTASSIUM SERPL-SCNC: 3.4 MMOL/L (ref 3.5–5.1)
POTASSIUM SERPL-SCNC: 3.7 MMOL/L (ref 3.5–5.1)
POTASSIUM SERPL-SCNC: 4.1 MMOL/L (ref 3.5–5.1)
POTASSIUM SERPL-SCNC: 4.3 MMOL/L (ref 3.5–5.1)
RBC # BLD AUTO: 3.87 M/UL (ref 4.6–6.2)
SAMPLE: ABNORMAL
SITE: ABNORMAL
SODIUM SERPL-SCNC: 131 MMOL/L (ref 136–145)
SODIUM SERPL-SCNC: 132 MMOL/L (ref 136–145)
SP02: 98
WBC # BLD AUTO: 6.8 K/UL (ref 3.9–12.7)

## 2022-07-05 PROCEDURE — 82800 BLOOD PH: CPT

## 2022-07-05 PROCEDURE — 84132 ASSAY OF SERUM POTASSIUM: CPT | Mod: 91 | Performed by: INTERNAL MEDICINE

## 2022-07-05 PROCEDURE — 25000003 PHARM REV CODE 250: Performed by: STUDENT IN AN ORGANIZED HEALTH CARE EDUCATION/TRAINING PROGRAM

## 2022-07-05 PROCEDURE — 80048 BASIC METABOLIC PNL TOTAL CA: CPT | Mod: 91 | Performed by: STUDENT IN AN ORGANIZED HEALTH CARE EDUCATION/TRAINING PROGRAM

## 2022-07-05 PROCEDURE — 85025 COMPLETE CBC W/AUTO DIFF WBC: CPT | Performed by: STUDENT IN AN ORGANIZED HEALTH CARE EDUCATION/TRAINING PROGRAM

## 2022-07-05 PROCEDURE — 27000207 HC ISOLATION

## 2022-07-05 PROCEDURE — 27000248 HC VAD-ADDITIONAL DAY

## 2022-07-05 PROCEDURE — 80048 BASIC METABOLIC PNL TOTAL CA: CPT | Performed by: STUDENT IN AN ORGANIZED HEALTH CARE EDUCATION/TRAINING PROGRAM

## 2022-07-05 PROCEDURE — 82803 BLOOD GASES ANY COMBINATION: CPT

## 2022-07-05 PROCEDURE — 85730 THROMBOPLASTIN TIME PARTIAL: CPT | Performed by: INTERNAL MEDICINE

## 2022-07-05 PROCEDURE — 25000003 PHARM REV CODE 250: Performed by: INTERNAL MEDICINE

## 2022-07-05 PROCEDURE — 20000000 HC ICU ROOM

## 2022-07-05 PROCEDURE — 83735 ASSAY OF MAGNESIUM: CPT | Performed by: STUDENT IN AN ORGANIZED HEALTH CARE EDUCATION/TRAINING PROGRAM

## 2022-07-05 PROCEDURE — 63600175 PHARM REV CODE 636 W HCPCS: Performed by: INTERNAL MEDICINE

## 2022-07-05 PROCEDURE — 84132 ASSAY OF SERUM POTASSIUM: CPT | Performed by: INTERNAL MEDICINE

## 2022-07-05 PROCEDURE — 99900035 HC TECH TIME PER 15 MIN (STAT)

## 2022-07-05 PROCEDURE — 83615 LACTATE (LD) (LDH) ENZYME: CPT | Performed by: INTERNAL MEDICINE

## 2022-07-05 RX ORDER — POTASSIUM CHLORIDE 20 MEQ/1
40 TABLET, EXTENDED RELEASE ORAL ONCE
Status: COMPLETED | OUTPATIENT
Start: 2022-07-05 | End: 2022-07-05

## 2022-07-05 RX ADMIN — MEXILETINE HYDROCHLORIDE 250 MG: 250 CAPSULE ORAL at 02:07

## 2022-07-05 RX ADMIN — PANTOPRAZOLE SODIUM 40 MG: 40 TABLET, DELAYED RELEASE ORAL at 11:07

## 2022-07-05 RX ADMIN — LEVOTHYROXINE SODIUM 112 MCG: 112 TABLET ORAL at 05:07

## 2022-07-05 RX ADMIN — BUSPIRONE HYDROCHLORIDE 5 MG: 5 TABLET ORAL at 09:07

## 2022-07-05 RX ADMIN — BUSPIRONE HYDROCHLORIDE 5 MG: 5 TABLET ORAL at 08:07

## 2022-07-05 RX ADMIN — ALPRAZOLAM 1 MG: 0.5 TABLET ORAL at 05:07

## 2022-07-05 RX ADMIN — POTASSIUM CHLORIDE 40 MEQ: 1500 TABLET, EXTENDED RELEASE ORAL at 11:07

## 2022-07-05 RX ADMIN — ZOLPIDEM TARTRATE 5 MG: 5 TABLET ORAL at 09:07

## 2022-07-05 RX ADMIN — EPTIFIBATIDE 1 MCG/KG/MIN: 0.75 INJECTION INTRAVENOUS at 12:07

## 2022-07-05 RX ADMIN — EPTIFIBATIDE 1 MCG/KG/MIN: 0.75 INJECTION INTRAVENOUS at 11:07

## 2022-07-05 RX ADMIN — BUSPIRONE HYDROCHLORIDE 5 MG: 5 TABLET ORAL at 02:07

## 2022-07-05 RX ADMIN — ALLOPURINOL 100 MG: 100 TABLET ORAL at 08:07

## 2022-07-05 RX ADMIN — AMLODIPINE BESYLATE 10 MG: 10 TABLET ORAL at 08:07

## 2022-07-05 RX ADMIN — HEPARIN SODIUM 16 UNITS/KG/HR: 5000 INJECTION INTRAVENOUS; SUBCUTANEOUS at 12:07

## 2022-07-05 RX ADMIN — ASPIRIN 325 MG: 325 TABLET, COATED ORAL at 08:07

## 2022-07-05 RX ADMIN — HEPARIN SODIUM 16 UNITS/KG/HR: 5000 INJECTION INTRAVENOUS; SUBCUTANEOUS at 07:07

## 2022-07-05 RX ADMIN — POTASSIUM CHLORIDE 40 MEQ: 1500 TABLET, EXTENDED RELEASE ORAL at 06:07

## 2022-07-05 RX ADMIN — AMIODARONE HYDROCHLORIDE 400 MG: 200 TABLET ORAL at 08:07

## 2022-07-05 RX ADMIN — EPTIFIBATIDE 1 MCG/KG/MIN: 0.75 INJECTION INTRAVENOUS at 09:07

## 2022-07-05 RX ADMIN — MEXILETINE HYDROCHLORIDE 250 MG: 250 CAPSULE ORAL at 09:07

## 2022-07-05 RX ADMIN — MEXILETINE HYDROCHLORIDE 250 MG: 250 CAPSULE ORAL at 05:07

## 2022-07-05 NOTE — PROGRESS NOTES
07/05/2022  Fitz Christianson    Current provider:  Antonio Hadley Jr.,*    Device interrogation:  TXP LVAD INTERROGATIONS 7/5/2022 7/5/2022 7/5/2022 7/5/2022 7/5/2022 7/5/2022 7/5/2022   Type HeartMate II HeartMate II HeartMate II HeartMate II HeartMate II HeartMate II HeartMate II   Flow 7.4 7.2 7.6 7.3 6.9 7.1 7.7   Speed 9800 9800 9800 9800 9800 9800 9800   PI 2.7 2.9 2.7 2.8 3.0 2.9 2.9   Power (Jackson) 7.5 7.5 7.5 7.5 7.3 7.4 7.7   LSL 9400 9400 9400 9400 9400 9400 9400   Pulsatility Intermittent pulse Intermittent pulse Intermittent pulse Intermittent pulse Intermittent pulse Intermittent pulse Intermittent pulse          Rounded on iTm Richards to ensure all mechanical assist device settings (IABP or VAD) were appropriate and all parameters were within limits.  I was able to ensure all back up equipment was present, the staff had no issues, and the Perfusion Department daily rounding was complete.      For implantable VADs: Interrogation of Ventricular assist device was performed with analysis of device parameters and review of device function. I have personally reviewed the interrogation findings and agree with findings as stated.     In emergency, the nursing units have been notified to contact the perfusion department either by:  Calling [phone] from 630am to 4pm Mon thru Fri, utilizing the On-Call Finder functionality of Epic and searching for Perfusion, or by contacting the hospital  from 4pm to 630am and on weekends and asking to speak with the perfusionist on call.    8:01 AM

## 2022-07-05 NOTE — PROGRESS NOTES
[facility] - Cardiac [facility]ensive [facility]  Heart Transplant  Progress Note    Patient Name: Tim Richards  MRN: [id number]  Admission Date: 6/27/2022  Hospital Length of Stay: 8 days  Attending Physician: Isaiah Santizo MD  Primary Care Provider: Deyanira Booth MD  Principal Problem:Left ventricular assist device (LVAD) complication    Subjective:     Interval History:   No acute events overnight  The patient denies symptoms at this time    Continuous Infusions:   eptifibatide 1 mcg/kg/min (07/05/22 1500)    heparin (porcine) in D5W 16 Units/kg/hr (07/05/22 1500)     Scheduled Meds:   allopurinoL  100 mg Oral Daily    amiodarone  400 mg Oral Daily    amLODIPine  10 mg Oral Daily    aspirin  325 mg Oral Daily    busPIRone  5 mg Oral TID    levothyroxine  112 mcg Oral Before breakfast    LIDOcaine  1 patch Transdermal Q24H    mexiletine  250 mg Oral Q8H    pantoprazole  40 mg Oral Daily with lunch     PRN Meds:acetaminophen, ALPRAZolam, sodium chloride 0.9%, zolpidem    Review of patient's allergies indicates:   Allergen Reactions    Lisinopril Anaphylaxis    Hydralazine analogues      Chronic constipation, impotence, dizziness     Objective:     Vital Signs (Most Recent):  Temp: 98.3 °F (36.8 °C) (07/05/22 1500)  Pulse: 84 (07/05/22 1500)  Resp: (!) 22 (07/05/22 1500)  BP: (!) 80/0 (07/05/22 1500)  SpO2: 96 % (07/05/22 1500)   Vital Signs (24h Range):  Temp:  [97.7 °F (36.5 °C)-98.5 °F (36.9 °C)] 98.3 °F (36.8 °C)  Pulse:  [80-90] 84  Resp:  [15-27] 22  SpO2:  [95 %-96 %] 96 %  BP: (74-82)/(0) 80/0  Arterial Line BP: (71-84)/(63-77) 81/73     Patient Vitals for the past 72 hrs (Last 3 readings):   Weight   07/05/22 1100 101.5 kg (223 lb 12.3 oz)   07/04/22 0400 95.5 kg (210 lb 8.6 oz)   07/03/22 0330 102.5 kg (225 lb 15.5 oz)     Body mass index is 29.52 kg/m².      Intake/Output Summary (Last 24 hours) at 7/5/2022 1554  Last data filed at 7/5/2022 1500  Gross per 24 hour   Intake 1747.98 ml    Output 2425 ml   Net -677.02 ml       Hemodynamic Parameters:       Telemetry: PVCs and NSVT x 3 beats    Physical Exam  Constitutional:       General: He is not in acute distress.     Appearance: He is obese.   HENT:      Head: Normocephalic.      Mouth/Throat:      Mouth: Mucous membranes are moist.   Eyes:      Extraocular Movements: Extraocular movements intact.   Cardiovascular:      Rate and Rhythm: Normal rate and regular rhythm.      Comments: VAD hum appreciated  Pulses detected via doppler  Pulmonary:      Effort: No respiratory distress.      Breath sounds: Normal breath sounds.      Comments: Mild tachypnea  Abdominal:      General: Bowel sounds are normal. There is no distension.      Palpations: Abdomen is soft.      Tenderness: There is no abdominal tenderness.   Musculoskeletal:      Cervical back: Neck supple.      Right lower leg: Edema present.      Left lower leg: Edema present.   Skin:     General: Skin is warm.   Neurological:      General: No focal deficit present.      Mental Status: He is oriented to person, place, and time.   Psychiatric:         Mood and Affect: Mood normal.         Behavior: Behavior normal.       Significant Labs:  CBC:  Recent Labs   Lab 07/03/22 0318 07/04/22  0330 07/05/22  0400   WBC 6.38 6.91 6.80   RBC 3.94* 4.00* 3.87*   HGB 10.5* 10.7* 10.4*   HCT 33.6* 34.4* 33.4*    199 193   MCV 85 86 86   MCH 26.6* 26.8* 26.9*   MCHC 31.3* 31.1* 31.1*     BNP:  Recent Labs   Lab 06/29/22  0448   BNP 1,292*     CMP:  Recent Labs   Lab 07/02/22  0648 07/02/22  1303 07/03/22  0318 07/03/22  0904 07/04/22  0057 07/04/22  0330 07/04/22  0825 07/05/22  0400 07/05/22  0851 07/05/22  1322   GLU 95   < > 98   < > 107 100  --  87  --   --    CALCIUM 10.2   < > 9.9   < > 9.6 10.0  --  10.0  --   --    ALBUMIN 3.2*  --  3.1*  --   --  3.2*  --   --   --   --    PROT 8.0  --  7.8  --   --  8.1  --   --   --   --    *   < > 133*   < > 133* 132*  --  132*  --   --    K 3.5    < > 3.4*   < > 3.9 3.4*   < > 3.4* 3.7 4.1   CO2 30*   < > 29   < > 27 28  --  29  --   --    CL 91*   < > 94*   < > 95 94*  --  93*  --   --    BUN 42*   < > 40*   < > 40* 37*  --  33*  --   --    CREATININE 2.2*   < > 2.0*   < > 2.1* 2.3*  --  1.9*  --   --    ALKPHOS 97  --  96  --   --  102  --   --   --   --    ALT 67*  --  58*  --   --  57*  --   --   --   --    *  --  106*  --   --  101*  --   --   --   --    BILITOT 3.3*  --  3.3*  --   --  3.0*  --   --   --   --     < > = values in this interval not displayed.      Coagulation:   Recent Labs   Lab 07/02/22  0649 07/02/22  1048 07/03/22  0318 07/04/22  0330 07/05/22  0400   INR 1.6*  --  1.4* 1.3*  --    APTT  --    < > 41.5* 39.1* 39.0*    < > = values in this interval not displayed.     LDH:  Recent Labs   Lab 07/03/22  0318 07/04/22  0330 07/05/22  0642   LDH 1,806* 1,899* 1,661*     Microbiology:  Microbiology Results (last 7 days)       ** No results found for the last 168 hours. **            I have reviewed all pertinent labs within the past 24 hours.    Estimated Creatinine Clearance: 55 mL/min (A) (based on SCr of 1.9 mg/dL (H)).    Diagnostic Results:  I have reviewed and interpreted all pertinent imaging results/findings within the past 24 hours.    Assessment and Plan:     57 Y/O M with Hx of stage D HFrEF (EF 10%) due to NICM underwent HM2 implant 3/8/18 and exchange of outflow graft 3/9/18, Hx VT/VF s/p SICD 2014 presenting with gradual worsening shortness of breath associated with nonpleuritic, nonradiating bilateral 4/10 retrosternal chest pressure pain.  Symptoms began yesterday and have gradually worsened in the last 12 hours.  He does report going to a family picnic with increased consumption of chicken wings, ribs and other salty meat products.  Prior to symptom onset, he reports nausea, nonbloody diarrhea began yesterday and single episode of nonbloody nonbilious vomiting this morning. He also complains of dizziness,  lightheadedness, and a mild HA. SOB worsened this morning prompting him to come to the ED.     In the ED, patient was in respiratory distress requiring BiPAP. MAP 96 and started on Nitro gtt. Work-up revealed WBC 10, K 5.4, Cr 2.5 (baseline 1.9), BNP 1950 (baseline 200-300s), Bili 2.1, LDH 1058, and INR 3.2. Bedside TTE with severely reduced EF, AV not opening, septum bulging into RV. Given IV Lasix 80 mg x1 with only 100-200 mL UOP and dark in color. HM2 interrogated and flows have been 8.5-9 L/min with no alarms or power surges. Cardiology consulted in the ED, dicussed with HTS, and decision made to admit to ICU for further mgmt.       * Left ventricular assist device (LVAD) complication  -Usually an elevated LDH is a red flag in a patient with LVAD, especially HM2 given the risk of pump thrombosis. PI, power and flow are elevated when compared to prior clinic visits  -LDH was trending up but has downtrended this morning (although trend could be confounded by covid infection)  -Haptoglobin low (although it has been chronically low)  -Direct and indirect wendy test are negative  -Plasma free hemoglobin elevated  - concerned that he could have some degree of thrombosis, since adding a second antiplatelet agent didn't resolved it, Integrilin with a Heparin drip were started until we have lab confirmation that this issue that resolved (normalization of LDH etc)  -Integrilin at 1 mcg/kg/min  -Heparin drip  -Aspirin 325mg daily    Procedure: Device Interrogation Including analysis of device parameters  Current Settings: Ventricular Assist Device    TXP LVAD INTERROGATIONS 7/5/2022 7/5/2022 7/5/2022 7/5/2022 7/5/2022 7/5/2022 7/5/2022   Type HeartMate II HeartMate II HeartMate II HeartMate II HeartMate II HeartMate II HeartMate II   Flow 5.9 6.2 6.7 6 6.2 6.4 6.4   Speed 9800 9800 9800 9800 9800 9800 9800   PI 2.7 3 2.9 3.2 2.7 2.9 3.1   Power (Jackson) 6.7 6.7 7.1 6.8 6.9 7 7.1   LSL 9400 9400 9400 9400 9400 9400 9400    Pulsatility Intermittent pulse Intermittent pulse Intermittent pulse Intermittent pulse Intermittent pulse Intermittent pulse Intermittent pulse       History of ventricular tachycardia  Patient experienced an episode of VT on 6/30 and experienced an ICD shock thought to be related to hypokalemia (K of 3.1 on 6/30)  - Plan to aggressively replete K, keep K>4 and Mg>2  - Will continue mexiletine 25mg q8hrs and amiodarone 400mg daily    COVID-19  -Previously hypoxic, now off oxygen  -Refers chills, nausea and soft stools  -ID consulted, recommended Remdesivir, course completed    Elevated serum lactate dehydrogenase (LDH)  Concerned for pump thrombosis  Cont to trend  Plan for possible pump exchange vs medical management as above    amiodarone induced hypothyrodism  -Continue levothyroxine 112 mcg     LVAD (left ventricular assist device) present  Procedure: Device Interrogation Including analysis of device parameters  Current Settings: Ventricular Assist Device  Review of device function is stable    TXP LVAD INTERROGATIONS 7/5/2022 7/5/2022 7/5/2022 7/5/2022 7/5/2022 7/5/2022 7/5/2022   Type HeartMate II HeartMate II HeartMate II HeartMate II HeartMate II HeartMate II HeartMate II   Flow 5.9 6.2 6.7 6 6.2 6.4 6.4   Speed 9800 9800 9800 9800 9800 9800 9800   PI 2.7 3 2.9 3.2 2.7 2.9 3.1   Power (Jackson) 6.7 6.7 7.1 6.8 6.9 7 7.1   LSL 9400 9400 9400 9400 9400 9400 9400   Pulsatility Intermittent pulse Intermittent pulse Intermittent pulse Intermittent pulse Intermittent pulse Intermittent pulse Intermittent pulse       CKD (chronic kidney disease), stage III  WAGNER on CKD  Creatinine is 1.9 today with baseline of 1.5-1.9.  - Avoiding nephrotoxic medications  - Continue to monitor  - Strict I/O's    Chronic combined systolic and diastolic heart failure  #ADHF  #NICMP  -Admitted with acute decompensated HF presence of HM2 LVAD  -Was placed on lasix gtt but appeared euvolemic and lasix gtt was stopped  -Continue  Amlodipine 10mg daily  -Holding Warfarin, Heparin started instead  -Speed echo at 9800 showed bowing to the right [place] [id number], IVS was at midline, MR decreased but worse AR        Harry Canales MD  Heart Transplant  [facility] - [facility]

## 2022-07-05 NOTE — TELEPHONE ENCOUNTER
MARIA DEL CARMEN contacted pt via telephone (ph: [phone]) re: possible insurance problem. Pt reports his insurance switched from [employer] to [employer] at the end of June. Pt reports no other concerns/questions/needs at this time. Pt aware of how to contact MARIA DEL CARMEN.    MARIA DEL CARMEN contacted [facility] Transplant , Hanane Irizarry, regarding change in pt's insurance. Per Edie, because pt was admitted to hospital in June with [employer], [facility] will be responsible for his admission until discharge.    MARIA DEL CARMEN remains available.

## 2022-07-05 NOTE — ASSESSMENT & PLAN NOTE
Procedure: Device Interrogation Including analysis of device parameters  Current Settings: Ventricular Assist Device  Review of device function is stable    TXP LVAD INTERROGATIONS 7/5/2022 7/5/2022 7/5/2022 7/5/2022 7/5/2022 7/5/2022 7/5/2022   Type HeartMate II HeartMate II HeartMate II HeartMate II HeartMate II HeartMate II HeartMate II   Flow 5.9 6.2 6.7 6 6.2 6.4 6.4   Speed 9800 9800 9800 9800 9800 9800 9800   PI 2.7 3 2.9 3.2 2.7 2.9 3.1   Power (Jackson) 6.7 6.7 7.1 6.8 6.9 7 7.1   LSL 9400 9400 9400 9400 9400 9400 9400   Pulsatility Intermittent pulse Intermittent pulse Intermittent pulse Intermittent pulse Intermittent pulse Intermittent pulse Intermittent pulse

## 2022-07-05 NOTE — ASSESSMENT & PLAN NOTE
WAGNER on CKD  Creatinine is 1.9 today with baseline of 1.5-1.9.  - Avoiding nephrotoxic medications  - Continue to monitor  - Strict I/O's

## 2022-07-05 NOTE — SUBJECTIVE & OBJECTIVE
Interval History:   No acute events overnight  The patient denies symptoms at this time    Continuous Infusions:   eptifibatide 1 mcg/kg/min (07/05/22 1500)    heparin (porcine) in D5W 16 Units/kg/hr (07/05/22 1500)     Scheduled Meds:   allopurinoL  100 mg Oral Daily    amiodarone  400 mg Oral Daily    amLODIPine  10 mg Oral Daily    aspirin  325 mg Oral Daily    busPIRone  5 mg Oral TID    levothyroxine  112 mcg Oral Before breakfast    LIDOcaine  1 patch Transdermal Q24H    mexiletine  250 mg Oral Q8H    pantoprazole  40 mg Oral Daily with lunch     PRN Meds:acetaminophen, ALPRAZolam, sodium chloride 0.9%, zolpidem    Review of patient's allergies indicates:   Allergen Reactions    Lisinopril Anaphylaxis    Hydralazine analogues      Chronic constipation, impotence, dizziness     Objective:     Vital Signs (Most Recent):  Temp: 98.3 °F (36.8 °C) (07/05/22 1500)  Pulse: 84 (07/05/22 1500)  Resp: (!) 22 (07/05/22 1500)  BP: (!) 80/0 (07/05/22 1500)  SpO2: 96 % (07/05/22 1500)   Vital Signs (24h Range):  Temp:  [97.7 °F (36.5 °C)-98.5 °F (36.9 °C)] 98.3 °F (36.8 °C)  Pulse:  [80-90] 84  Resp:  [15-27] 22  SpO2:  [95 %-96 %] 96 %  BP: (74-82)/(0) 80/0  Arterial Line BP: (71-84)/(63-77) 81/73     Patient Vitals for the past 72 hrs (Last 3 readings):   Weight   07/05/22 1100 101.5 kg (223 lb 12.3 oz)   07/04/22 0400 95.5 kg (210 lb 8.6 oz)   07/03/22 0330 102.5 kg (225 lb 15.5 oz)     Body mass index is 29.52 kg/m².      Intake/Output Summary (Last 24 hours) at 7/5/2022 1554  Last data filed at 7/5/2022 1500  Gross per 24 hour   Intake 1747.98 ml   Output 2425 ml   Net -677.02 ml       Hemodynamic Parameters:       Telemetry: PVCs and NSVT x 3 beats    Physical Exam  Constitutional:       General: He is not in acute distress.     Appearance: He is obese.   HENT:      Head: Normocephalic.      Mouth/Throat:      Mouth: Mucous membranes are moist.   Eyes:      Extraocular Movements: Extraocular movements intact.    Cardiovascular:      Rate and Rhythm: Normal rate and regular rhythm.      Comments: VAD hum appreciated  Pulses detected via doppler  Pulmonary:      Effort: No respiratory distress.      Breath sounds: Normal breath sounds.      Comments: Mild tachypnea  Abdominal:      General: Bowel sounds are normal. There is no distension.      Palpations: Abdomen is soft.      Tenderness: There is no abdominal tenderness.   Musculoskeletal:      Cervical back: Neck supple.      Right lower leg: Edema present.      Left lower leg: Edema present.   Skin:     General: Skin is warm.   Neurological:      General: No focal deficit present.      Mental Status: He is oriented to person, place, and time.   Psychiatric:         Mood and Affect: Mood normal.         Behavior: Behavior normal.       Significant Labs:  CBC:  Recent Labs   Lab 07/03/22  0318 07/04/22  0330 07/05/22  0400   WBC 6.38 6.91 6.80   RBC 3.94* 4.00* 3.87*   HGB 10.5* 10.7* 10.4*   HCT 33.6* 34.4* 33.4*    199 193   MCV 85 86 86   MCH 26.6* 26.8* 26.9*   MCHC 31.3* 31.1* 31.1*     BNP:  Recent Labs   Lab 06/29/22  0448   BNP 1,292*     CMP:  Recent Labs   Lab 07/02/22  0648 07/02/22  1303 07/03/22  0318 07/03/22  0904 07/04/22  0057 07/04/22  0330 07/04/22  0825 07/05/22  0400 07/05/22  0851 07/05/22  1322   GLU 95   < > 98   < > 107 100  --  87  --   --    CALCIUM 10.2   < > 9.9   < > 9.6 10.0  --  10.0  --   --    ALBUMIN 3.2*  --  3.1*  --   --  3.2*  --   --   --   --    PROT 8.0  --  7.8  --   --  8.1  --   --   --   --    *   < > 133*   < > 133* 132*  --  132*  --   --    K 3.5   < > 3.4*   < > 3.9 3.4*   < > 3.4* 3.7 4.1   CO2 30*   < > 29   < > 27 28  --  29  --   --    CL 91*   < > 94*   < > 95 94*  --  93*  --   --    BUN 42*   < > 40*   < > 40* 37*  --  33*  --   --    CREATININE 2.2*   < > 2.0*   < > 2.1* 2.3*  --  1.9*  --   --    ALKPHOS 97  --  96  --   --  102  --   --   --   --    ALT 67*  --  58*  --   --  57*  --   --   --   --     *  --  106*  --   --  101*  --   --   --   --    BILITOT 3.3*  --  3.3*  --   --  3.0*  --   --   --   --     < > = values in this interval not displayed.      Coagulation:   Recent Labs   Lab 07/02/22  0649 07/02/22  1048 07/03/22  0318 07/04/22  0330 07/05/22  0400   INR 1.6*  --  1.4* 1.3*  --    APTT  --    < > 41.5* 39.1* 39.0*    < > = values in this interval not displayed.     LDH:  Recent Labs   Lab 07/03/22 0318 07/04/22  0330 07/05/22  0642   LDH 1,806* 1,899* 1,661*     Microbiology:  Microbiology Results (last 7 days)       ** No results found for the last 168 hours. **            I have reviewed all pertinent labs within the past 24 hours.    Estimated Creatinine Clearance: 55 mL/min (A) (based on SCr of 1.9 mg/dL (H)).    Diagnostic Results:  I have reviewed and interpreted all pertinent imaging results/findings within the past 24 hours.

## 2022-07-05 NOTE — NURSING
Patient voiced concerns about lump on right foot, no discoloration noted, cap refill normal, pulses obtained via doppler. Notified MD Kohli and VAD coordinator Bridget Freeman. Md Kohli plans to discuss possible ultrasound with Team in morning.

## 2022-07-05 NOTE — ASSESSMENT & PLAN NOTE
-Usually an elevated LDH is a red flag in a patient with LVAD, especially HM2 given the risk of pump thrombosis. PI, power and flow are elevated when compared to prior clinic visits  -LDH was trending up but has downtrended this morning (although trend could be confounded by covid infection)  -Haptoglobin low (although it has been chronically low)  -Direct and indirect wendy test are negative  -Plasma free hemoglobin elevated  - concerned that he could have some degree of thrombosis, since adding a second antiplatelet agent didn't resolved it, Integrilin with a Heparin drip were started until we have lab confirmation that this issue that resolved (normalization of LDH etc)  -Integrilin at 1 mcg/kg/min  -Heparin drip  -Aspirin 325mg daily    Procedure: Device Interrogation Including analysis of device parameters  Current Settings: Ventricular Assist Device    TXP LVAD INTERROGATIONS 7/5/2022 7/5/2022 7/5/2022 7/5/2022 7/5/2022 7/5/2022 7/5/2022   Type HeartMate II HeartMate II HeartMate II HeartMate II HeartMate II HeartMate II HeartMate II   Flow 5.9 6.2 6.7 6 6.2 6.4 6.4   Speed 9800 9800 9800 9800 9800 9800 9800   PI 2.7 3 2.9 3.2 2.7 2.9 3.1   Power (Jackson) 6.7 6.7 7.1 6.8 6.9 7 7.1   LSL 9400 9400 9400 9400 9400 9400 9400   Pulsatility Intermittent pulse Intermittent pulse Intermittent pulse Intermittent pulse Intermittent pulse Intermittent pulse Intermittent pulse

## 2022-07-05 NOTE — PROGRESS NOTES
Interdisciplinary Rounds Report:   Attended interdisciplinary rounds with the [facility]/CTS services including the LVAD Coordinators, social workers, cardiologists, surgeons,  PT/OT/Speech, dietician, and unit charge nurses. Discussed patient status, plan of care, goals of care, including DC date, and post discharge needs. Plan of care will be discussed with the patient and/or family per the physician while rounding on the floor. This is a recurring meeting that is medically and socially necessary to collaborate with the interdisciplinary team to assist patient needs and safe discharge.

## 2022-07-05 NOTE — ASSESSMENT & PLAN NOTE
Concerned for pump thrombosis  Cont to trend  Plan for possible pump exchange vs medical management as above

## 2022-07-05 NOTE — PLAN OF CARE
Problem: Adult Inpatient Plan of Care  Goal: Plan of Care Review  Outcome: Ongoing, Progressing  Goal: Patient-Specific Goal (Individualized)  Outcome: Ongoing, Progressing  Goal: Absence of Hospital-Acquired Illness or Injury  Outcome: Ongoing, Progressing     CICU DAILY GOALS       A: Awake    RASS: Goal - RASS Goal: 0-->alert and calm  Actual - RASS (Newman Agitation-Sedation Scale): 0-->alert and calm   Restraint necessity:    B: Breath   SBT: Not intubated   C: Coordinate A & B, analgesics/sedatives   Pain: managed    SAT: Not intubated  D: Delirium   CAM-ICU: Overall CAM-ICU: Negative  E: Early(intubated/ Progressive (non-intubated) Mobility   MOVE Screen: Pass   Activity: Activity Management: Ankle pumps - L1, Arm raise - L1  FAS: Feeding/Nutrition   Diet order: Diet/Nutrition Received: 2 gram sodium, low saturated fat/low cholesterol,   Fluid restriction: Fluid Requirement: 1500ml FR  T: Thrombus   DVT prophylaxis: VTE Required Core Measure: Pharmacological prophylaxis initiated/maintained  H: HOB Elevation   Head of Bed (HOB) Positioning: HOB at 30-45 degrees  U: Ulcer Prophylaxis   GI: yes  G: Glucose control   N/a     S: Skin   Bundle compliance: no   Bathing/Skin Care: bath, complete, electrode patches/site rotation, dressed/undressed   B: Bowel Function   no issues   I: Indwelling Catheters   [place] necessity:     CVC necessity: Yes   IPAD offered: Not appropriate  D: De-escalation Antibx   No  Plan for the day   Continue to monitor hemodynamics and LVAD PUMP hemodymanics.   Family/Goals of care/Code Status   Code Status: Full Code     Cvp trend 8/9/8, SVO2 63%,  VS and assessment per flow sheet, patient progressing towards goals as tolerated, plan of care reviewed with Tim Richards and family, all concerns addressed, will continue to monitor.

## 2022-07-05 NOTE — ASSESSMENT & PLAN NOTE
#ADHF  #NICMP  -Admitted with acute decompensated HF presence of HM2 LVAD  -Was placed on lasix gtt but appeared euvolemic and lasix gtt was stopped  -Continue Amlodipine 10mg daily  -Holding Warfarin, Heparin started instead  -Speed echo at 9800 showed bowing to the right and severe MR, [place] [id number], IVS was at midline, MR decreased but worse AR

## 2022-07-05 NOTE — NURSING
Notified MD Kohli of serum potassium+ 3.4 and cr = 1.9. New orders for potassium replacement, potassium 40meq po x 1 dose. Will continue to monitor.

## 2022-07-06 LAB
ALLENS TEST: ABNORMAL
ANION GAP SERPL CALC-SCNC: 10 MMOL/L (ref 8–16)
ANION GAP SERPL CALC-SCNC: 9 MMOL/L (ref 8–16)
APTT BLDCRRT: 38.3 SEC (ref 21–32)
APTT BLDCRRT: 40.2 SEC (ref 21–32)
APTT BLDCRRT: 40.4 SEC (ref 21–32)
BASOPHILS # BLD AUTO: 0.01 K/UL (ref 0–0.2)
BASOPHILS NFR BLD: 0.2 % (ref 0–1.9)
BUN SERPL-MCNC: 28 MG/DL (ref 6–20)
BUN SERPL-MCNC: 29 MG/DL (ref 6–20)
CALCIUM SERPL-MCNC: 9.6 MG/DL (ref 8.7–10.5)
CALCIUM SERPL-MCNC: 9.7 MG/DL (ref 8.7–10.5)
CHLORIDE SERPL-SCNC: 97 MMOL/L (ref 95–110)
CHLORIDE SERPL-SCNC: 99 MMOL/L (ref 95–110)
CO2 SERPL-SCNC: 24 MMOL/L (ref 23–29)
CO2 SERPL-SCNC: 26 MMOL/L (ref 23–29)
CREAT SERPL-MCNC: 2 MG/DL (ref 0.5–1.4)
CREAT SERPL-MCNC: 2.1 MG/DL (ref 0.5–1.4)
DIFFERENTIAL METHOD: ABNORMAL
EOSINOPHIL # BLD AUTO: 0.1 K/UL (ref 0–0.5)
EOSINOPHIL NFR BLD: 1.6 % (ref 0–8)
ERYTHROCYTE [DISTWIDTH] IN BLOOD BY AUTOMATED COUNT: 15.2 % (ref 11.5–14.5)
EST. GFR  (AFRICAN AMERICAN): 39.8 ML/MIN/1.73 M^2
EST. GFR  (AFRICAN AMERICAN): 42.2 ML/MIN/1.73 M^2
EST. GFR  (NON AFRICAN AMERICAN): 34.4 ML/MIN/1.73 M^2
EST. GFR  (NON AFRICAN AMERICAN): 36.5 ML/MIN/1.73 M^2
GLUCOSE SERPL-MCNC: 102 MG/DL (ref 70–110)
GLUCOSE SERPL-MCNC: 82 MG/DL (ref 70–110)
HCO3 UR-SCNC: 30.3 MMOL/L (ref 24–28)
HCT VFR BLD AUTO: 30.9 % (ref 40–54)
HGB BLD-MCNC: 9.7 G/DL (ref 14–18)
IMM GRANULOCYTES # BLD AUTO: 0.03 K/UL (ref 0–0.04)
IMM GRANULOCYTES NFR BLD AUTO: 0.5 % (ref 0–0.5)
LACTATE SERPL-SCNC: 0.8 MMOL/L (ref 0.5–2.2)
LDH SERPL L TO P-CCNC: 1429 U/L (ref 110–260)
LYMPHOCYTES # BLD AUTO: 0.9 K/UL (ref 1–4.8)
LYMPHOCYTES NFR BLD: 14.5 % (ref 18–48)
MAGNESIUM SERPL-MCNC: 2 MG/DL (ref 1.6–2.6)
MAGNESIUM SERPL-MCNC: 2.2 MG/DL (ref 1.6–2.6)
MCH RBC QN AUTO: 26.4 PG (ref 27–31)
MCHC RBC AUTO-ENTMCNC: 31.4 G/DL (ref 32–36)
MCV RBC AUTO: 84 FL (ref 82–98)
MONOCYTES # BLD AUTO: 1 K/UL (ref 0.3–1)
MONOCYTES NFR BLD: 16 % (ref 4–15)
NEUTROPHILS # BLD AUTO: 4.2 K/UL (ref 1.8–7.7)
NEUTROPHILS NFR BLD: 67.2 % (ref 38–73)
NRBC BLD-RTO: 0 /100 WBC
PCO2 BLDA: 49.4 MMHG (ref 35–45)
PH SMN: 7.4 [PH] (ref 7.35–7.45)
PLATELET # BLD AUTO: 172 K/UL (ref 150–450)
PMV BLD AUTO: 10.1 FL (ref 9.2–12.9)
PO2 BLDA: 33 MMHG (ref 40–60)
POC BE: 5 MMOL/L
POC SATURATED O2: 62 % (ref 95–100)
POC TCO2: 32 MMOL/L (ref 24–29)
POTASSIUM SERPL-SCNC: 3.7 MMOL/L (ref 3.5–5.1)
POTASSIUM SERPL-SCNC: 4.4 MMOL/L (ref 3.5–5.1)
RBC # BLD AUTO: 3.68 M/UL (ref 4.6–6.2)
SAMPLE: ABNORMAL
SITE: ABNORMAL
SODIUM SERPL-SCNC: 132 MMOL/L (ref 136–145)
SODIUM SERPL-SCNC: 133 MMOL/L (ref 136–145)
WBC # BLD AUTO: 6.19 K/UL (ref 3.9–12.7)

## 2022-07-06 PROCEDURE — 20000000 HC ICU ROOM

## 2022-07-06 PROCEDURE — 80048 BASIC METABOLIC PNL TOTAL CA: CPT | Mod: 91 | Performed by: STUDENT IN AN ORGANIZED HEALTH CARE EDUCATION/TRAINING PROGRAM

## 2022-07-06 PROCEDURE — 97535 SELF CARE MNGMENT TRAINING: CPT

## 2022-07-06 PROCEDURE — 25000003 PHARM REV CODE 250: Performed by: STUDENT IN AN ORGANIZED HEALTH CARE EDUCATION/TRAINING PROGRAM

## 2022-07-06 PROCEDURE — 99233 SBSQ HOSP IP/OBS HIGH 50: CPT | Mod: 25,,, | Performed by: INTERNAL MEDICINE

## 2022-07-06 PROCEDURE — 85025 COMPLETE CBC W/AUTO DIFF WBC: CPT | Performed by: STUDENT IN AN ORGANIZED HEALTH CARE EDUCATION/TRAINING PROGRAM

## 2022-07-06 PROCEDURE — 27000248 HC VAD-ADDITIONAL DAY

## 2022-07-06 PROCEDURE — 85730 THROMBOPLASTIN TIME PARTIAL: CPT | Mod: 91 | Performed by: STUDENT IN AN ORGANIZED HEALTH CARE EDUCATION/TRAINING PROGRAM

## 2022-07-06 PROCEDURE — 93750 INTERROGATION VAD IN PERSON: CPT | Mod: ,,, | Performed by: INTERNAL MEDICINE

## 2022-07-06 PROCEDURE — 27000207 HC ISOLATION

## 2022-07-06 PROCEDURE — 83735 ASSAY OF MAGNESIUM: CPT | Performed by: STUDENT IN AN ORGANIZED HEALTH CARE EDUCATION/TRAINING PROGRAM

## 2022-07-06 PROCEDURE — 97164 PT RE-EVAL EST PLAN CARE: CPT

## 2022-07-06 PROCEDURE — 97165 OT EVAL LOW COMPLEX 30 MIN: CPT

## 2022-07-06 PROCEDURE — 99900035 HC TECH TIME PER 15 MIN (STAT)

## 2022-07-06 PROCEDURE — 99233 PR SUBSEQUENT HOSPITAL CARE,LEVL III: ICD-10-PCS | Mod: 25,,, | Performed by: INTERNAL MEDICINE

## 2022-07-06 PROCEDURE — 63600175 PHARM REV CODE 636 W HCPCS: Performed by: INTERNAL MEDICINE

## 2022-07-06 PROCEDURE — 93750 PR INTERROGATE VENT ASSIST DEV, IN PERSON, W PHYSICIAN ANALYSIS: ICD-10-PCS | Mod: ,,, | Performed by: INTERNAL MEDICINE

## 2022-07-06 PROCEDURE — 83615 LACTATE (LD) (LDH) ENZYME: CPT | Performed by: STUDENT IN AN ORGANIZED HEALTH CARE EDUCATION/TRAINING PROGRAM

## 2022-07-06 PROCEDURE — 97116 GAIT TRAINING THERAPY: CPT

## 2022-07-06 PROCEDURE — 25000003 PHARM REV CODE 250: Performed by: INTERNAL MEDICINE

## 2022-07-06 PROCEDURE — 83605 ASSAY OF LACTIC ACID: CPT | Performed by: STUDENT IN AN ORGANIZED HEALTH CARE EDUCATION/TRAINING PROGRAM

## 2022-07-06 PROCEDURE — 85730 THROMBOPLASTIN TIME PARTIAL: CPT | Performed by: INTERNAL MEDICINE

## 2022-07-06 PROCEDURE — 85730 THROMBOPLASTIN TIME PARTIAL: CPT | Mod: 91 | Performed by: INTERNAL MEDICINE

## 2022-07-06 RX ORDER — POTASSIUM CHLORIDE 20 MEQ/1
40 TABLET, EXTENDED RELEASE ORAL ONCE
Status: DISCONTINUED | OUTPATIENT
Start: 2022-07-06 | End: 2022-07-06

## 2022-07-06 RX ORDER — POTASSIUM CHLORIDE 750 MG/1
30 CAPSULE, EXTENDED RELEASE ORAL ONCE
Status: COMPLETED | OUTPATIENT
Start: 2022-07-06 | End: 2022-07-06

## 2022-07-06 RX ORDER — POTASSIUM CHLORIDE 750 MG/1
10 CAPSULE, EXTENDED RELEASE ORAL ONCE
Status: COMPLETED | OUTPATIENT
Start: 2022-07-06 | End: 2022-07-06

## 2022-07-06 RX ORDER — FAMOTIDINE 20 MG/1
20 TABLET, FILM COATED ORAL ONCE
Status: COMPLETED | OUTPATIENT
Start: 2022-07-07 | End: 2022-07-07

## 2022-07-06 RX ADMIN — ALPRAZOLAM 1 MG: 0.5 TABLET ORAL at 02:07

## 2022-07-06 RX ADMIN — EPTIFIBATIDE 1 MCG/KG/MIN: 0.75 INJECTION INTRAVENOUS at 08:07

## 2022-07-06 RX ADMIN — BUSPIRONE HYDROCHLORIDE 5 MG: 5 TABLET ORAL at 08:07

## 2022-07-06 RX ADMIN — ASPIRIN 325 MG: 325 TABLET, COATED ORAL at 08:07

## 2022-07-06 RX ADMIN — LEVOTHYROXINE SODIUM 112 MCG: 112 TABLET ORAL at 06:07

## 2022-07-06 RX ADMIN — MEXILETINE HYDROCHLORIDE 250 MG: 250 CAPSULE ORAL at 01:07

## 2022-07-06 RX ADMIN — POTASSIUM CHLORIDE 10 MEQ: 10 CAPSULE, COATED, EXTENDED RELEASE ORAL at 06:07

## 2022-07-06 RX ADMIN — AMIODARONE HYDROCHLORIDE 400 MG: 200 TABLET ORAL at 08:07

## 2022-07-06 RX ADMIN — POTASSIUM CHLORIDE 30 MEQ: 10 CAPSULE, COATED, EXTENDED RELEASE ORAL at 06:07

## 2022-07-06 RX ADMIN — BUSPIRONE HYDROCHLORIDE 5 MG: 5 TABLET ORAL at 02:07

## 2022-07-06 RX ADMIN — EPTIFIBATIDE 1 MCG/KG/MIN: 0.75 INJECTION INTRAVENOUS at 05:07

## 2022-07-06 RX ADMIN — MEXILETINE HYDROCHLORIDE 250 MG: 250 CAPSULE ORAL at 09:07

## 2022-07-06 RX ADMIN — PANTOPRAZOLE SODIUM 40 MG: 40 TABLET, DELAYED RELEASE ORAL at 11:07

## 2022-07-06 RX ADMIN — MEXILETINE HYDROCHLORIDE 250 MG: 250 CAPSULE ORAL at 06:07

## 2022-07-06 RX ADMIN — HEPARIN SODIUM 18 UNITS/KG/HR: 5000 INJECTION INTRAVENOUS; SUBCUTANEOUS at 01:07

## 2022-07-06 RX ADMIN — AMLODIPINE BESYLATE 10 MG: 10 TABLET ORAL at 08:07

## 2022-07-06 RX ADMIN — ALLOPURINOL 100 MG: 100 TABLET ORAL at 08:07

## 2022-07-06 NOTE — PROGRESS NOTES
[facility] - Cardiac Intensive Care  Heart Transplant  Progress Note    Patient Name: Tim Richards  MRN: [id number]  Admission Date: 6/27/2022  Hospital Length of Stay: 9 days  Attending Physician: Isaiah Santizo MD  Primary Care Provider: Deyanira Booth MD  Principal Problem:Left ventricular assist device (LVAD) complication    Subjective:     Interval History:   No acute events overnight  Urine has now darkened compared to prior.  CVP is 14    Continuous Infusions:   eptifibatide 1 mcg/kg/min (07/06/22 1305)    heparin (porcine) in D5W 18 Units/kg/hr (07/06/22 1330)     Scheduled Meds:   allopurinoL  100 mg Oral Daily    amiodarone  400 mg Oral Daily    amLODIPine  10 mg Oral Daily    aspirin  325 mg Oral Daily    busPIRone  5 mg Oral TID    levothyroxine  112 mcg Oral Before breakfast    LIDOcaine  1 patch Transdermal Q24H    mexiletine  250 mg Oral Q8H    pantoprazole  40 mg Oral Daily with lunch     PRN Meds:acetaminophen, ALPRAZolam, sodium chloride 0.9%, zolpidem    Review of patient's allergies indicates:   Allergen Reactions    Lisinopril Anaphylaxis    Hydralazine analogues      Chronic constipation, impotence, dizziness     Objective:     Vital Signs (Most Recent):  Temp: 97.6 °F (36.4 °C) (07/06/22 1105)  Pulse: 83 (07/06/22 1305)  Resp: (!) 26 (07/06/22 1305)  BP: (!) 88/0 (07/06/22 1105)  SpO2: 96 % (07/06/22 1105)   Vital Signs (24h Range):  Temp:  [97.6 °F (36.4 °C)-98.3 °F (36.8 °C)] 97.6 °F (36.4 °C)  Pulse:  [77-92] 83  Resp:  [18-40] 26  SpO2:  [93 %-96 %] 96 %  BP: (70-96)/(0-73) 88/0  Arterial Line BP: (72-94)/(62-86) 81/73     Patient Vitals for the past 72 hrs (Last 3 readings):   Weight   07/05/22 1100 101.5 kg (223 lb 12.3 oz)   07/04/22 0400 95.5 kg (210 lb 8.6 oz)     Body mass index is 29.52 kg/m².      Intake/Output Summary (Last 24 hours) at 7/6/2022 1422  Last data filed at 7/6/2022 1305  Gross per 24 hour   Intake 1670.93 ml   Output 1675 ml   Net -4.07 ml        Hemodynamic Parameters:       Telemetry: No events on telemetry    Physical Exam  Constitutional:       General: He is not in acute distress.     Appearance: He is obese.   HENT:      Head: Normocephalic.      Mouth/Throat:      Mouth: Mucous membranes are moist.   Eyes:      Extraocular Movements: Extraocular movements intact.   Cardiovascular:      Rate and Rhythm: Normal rate and regular rhythm.      Comments: VAD hum appreciated  Pulses detected via doppler  Pulmonary:      Effort: No respiratory distress.      Breath sounds: Normal breath sounds.      Comments: Mild tachypnea  Abdominal:      General: Bowel sounds are normal. There is no distension.      Palpations: Abdomen is soft.      Tenderness: There is no abdominal tenderness.   Musculoskeletal:      Cervical back: Neck supple.   Skin:     General: Skin is warm.   Neurological:      General: No focal deficit present.      Mental Status: He is oriented to person, place, and time.   Psychiatric:         Mood and Affect: Mood normal.         Behavior: Behavior normal.       Significant Labs:  CBC:  Recent Labs   Lab 07/04/22  0330 07/05/22  0400 07/06/22  0442   WBC 6.91 6.80 6.19   RBC 4.00* 3.87* 3.68*   HGB 10.7* 10.4* 9.7*   HCT 34.4* 33.4* 30.9*    193 172   MCV 86 86 84   MCH 26.8* 26.9* 26.4*   MCHC 31.1* 31.1* 31.4*     BNP:  No results for input(s): BNP in the last 168 hours.    Invalid input(s): BNPTRIAGELBLO  CMP:  Recent Labs   Lab 07/02/22  0648 07/02/22  1303 07/03/22  0318 07/03/22  0904 07/04/22  0330 07/04/22  0825 07/05/22  0400 07/05/22  0851 07/05/22  1322 07/05/22  1838 07/06/22  0442   GLU 95   < > 98   < > 100  --  87  --   --  115* 82   CALCIUM 10.2   < > 9.9   < > 10.0  --  10.0  --   --  9.8 9.7   ALBUMIN 3.2*  --  3.1*  --  3.2*  --   --   --   --   --   --    PROT 8.0  --  7.8  --  8.1  --   --   --   --   --   --    *   < > 133*   < > 132*  --  132*  --   --  131* 133*   K 3.5   < > 3.4*   < > 3.4*   < > 3.4*    < > 4.1 4.3 3.7   CO2 30*   < > 29   < > 28  --  29  --   --  25 26   CL 91*   < > 94*   < > 94*  --  93*  --   --  97 97   BUN 42*   < > 40*   < > 37*  --  33*  --   --  33* 29*   CREATININE 2.2*   < > 2.0*   < > 2.3*  --  1.9*  --   --  2.1* 2.0*   ALKPHOS 97  --  96  --  102  --   --   --   --   --   --    ALT 67*  --  58*  --  57*  --   --   --   --   --   --    *  --  106*  --  101*  --   --   --   --   --   --    BILITOT 3.3*  --  3.3*  --  3.0*  --   --   --   --   --   --     < > = values in this interval not displayed.      Coagulation:   Recent Labs   Lab 07/02/22  0649 07/02/22  1048 07/03/22  0318 07/04/22  0330 07/05/22  0400 07/06/22  0442 07/06/22  1333   INR 1.6*  --  1.4* 1.3*  --   --   --    APTT  --    < > 41.5* 39.1* 39.0* 38.3* 40.4*    < > = values in this interval not displayed.     LDH:  Recent Labs   Lab 07/04/22  0330 07/05/22  0642 07/06/22  0827   LDH 1,899* 1,661* 1,429*     Microbiology:  Microbiology Results (last 7 days)       ** No results found for the last 168 hours. **            I have reviewed all pertinent labs within the past 24 hours.    Estimated Creatinine Clearance: 52.2 mL/min (A) (based on SCr of 2 mg/dL (H)).    Diagnostic Results:  I have reviewed and interpreted all pertinent imaging results/findings within the past 24 hours.    Assessment and Plan:     57 Y/O M with Hx of stage D HFrEF (EF 10%) due to NICM underwent HM2 implant 3/8/18 and exchange of outflow graft 3/9/18, Hx VT/VF s/p SICD 2014 presenting with gradual worsening shortness of breath associated with nonpleuritic, nonradiating bilateral 4/10 retrosternal chest pressure pain.  Symptoms began yesterday and have gradually worsened in the last 12 hours.  He does report going to a family picnic with increased consumption of chicken wings, ribs and other salty meat products.  Prior to symptom onset, he reports nausea, nonbloody diarrhea began yesterday and single episode of nonbloody nonbilious  vomiting this morning. He also complains of dizziness, lightheadedness, and a mild HA. SOB worsened this morning prompting him to come to the ED.     In the ED, patient was in respiratory distress requiring BiPAP. MAP 96 and started on Nitro gtt. Work-up revealed WBC 10, K 5.4, Cr 2.5 (baseline 1.9), BNP 1950 (baseline 200-300s), Bili 2.1, LDH 1058, and INR 3.2. Bedside TTE with severely reduced EF, AV not opening, septum bulging into RV. Given IV Lasix 80 mg x1 with only 100-200 mL UOP and dark in color. HM2 interrogated and flows have been 8.5-9 L/min with no alarms or power surges. Cardiology consulted in the ED, dicussed with HTS, and decision made to admit to ICU for further mgmt.       * Left ventricular assist device (LVAD) complication  -Usually an elevated LDH is a red flag in a patient with LVAD, especially HM2 given the risk of pump thrombosis. PI, power and flow are elevated when compared to prior clinic visits  -LDH is now downtrending, but there is continued concern for red cell shedding  -Haptoglobin low (although it has been chronically low)  -Direct and indirect wendy test are negative  -Plasma free hemoglobin elevated  - concerned that he could have some degree of thrombosis, since adding a second antiplatelet agent didn't resolved it, Integrilin with a Heparin drip were started until we have lab confirmation that this issue that resolved (normalization of LDH etc)  -Integrilin at 1 mcg/kg/min  -Heparin drip  -Aspirin 325mg daily    Procedure: Device Interrogation Including analysis of device parameters  Current Settings: Ventricular Assist Device    TXP LVAD INTERROGATIONS 7/6/2022 7/6/2022 7/6/2022 7/6/2022 7/6/2022 7/6/2022 7/6/2022   Type HeartMate II HeartMate II HeartMate II HeartMate II HeartMate II HeartMate II HeartMate II   Flow 8.8 9 8.0 8.8 8.3 7 7.4   Speed 9800 9800 9800 9800 9800 9800 9800   PI 2.6 2.8 2.7 2.5 2.5 3.1 2.7   Power (Jackson) 8.4 8.2 7.9 8.4 7.1 7.4 7.8   LSL 9400 9400  9400 9400 9400 9400 9400   Pulsatility Intermittent pulse Intermittent pulse Intermittent pulse Intermittent pulse Intermittent pulse Intermittent pulse Intermittent pulse       History of ventricular tachycardia  Patient experienced an episode of VT on 6/30 and experienced an ICD shock thought to be related to hypokalemia (K of 3.1 on 6/30)  - Plan to aggressively replete K, keep K>4 and Mg>2  - Will continue mexiletine 25mg q8hrs and amiodarone 400mg daily    COVID-19  -Previously hypoxic, now off oxygen  -ID was consulted, recommended Remdesivir, course completed    Elevated serum lactate dehydrogenase (LDH)  Concerned for pump thrombosis  Cont to trend  Plan for possible pump exchange vs medical management as above    amiodarone induced hypothyrodism  -Continue levothyroxine 112 mcg     LVAD (left ventricular assist device) present  Procedure: Device Interrogation Including analysis of device parameters  Current Settings: Ventricular Assist Device  Review of device function is stable    TXP LVAD INTERROGATIONS 7/6/2022 7/6/2022 7/6/2022 7/6/2022 7/6/2022 7/6/2022 7/6/2022   Type HeartMate II HeartMate II HeartMate II HeartMate II HeartMate II HeartMate II HeartMate II   Flow 8.8 9 8.0 8.8 8.3 7 7.4   Speed 9800 9800 9800 9800 9800 9800 9800   PI 2.6 2.8 2.7 2.5 2.5 3.1 2.7   Power (Jackson) 8.4 8.2 7.9 8.4 7.1 7.4 7.8   LSL 9400 9400 9400 9400 9400 9400 9400   Pulsatility Intermittent pulse Intermittent pulse Intermittent pulse Intermittent pulse Intermittent pulse Intermittent pulse Intermittent pulse       CKD (chronic kidney disease), stage III  WAGNER on CKD  Creatinine is 2.0 today with baseline of 1.5-1.9.  - Avoiding nephrotoxic medications  - Continue to monitor  - Strict I/O's    Chronic combined systolic and diastolic heart failure  #ADHF  #NICMP  -Admitted with acute decompensated HF presence of HM2 LVAD  -Was placed on lasix gtt but appeared euvolemic and lasix gtt was stopped  -Continue Amlodipine 10mg  daily  -Holding Warfarin, Heparin started instead  -Speed echo at 9800 showed bowing to the right [place] [id number], IVS was at midline, MR decreased but worse AR        Harry Canales MD  Heart Transplant  [facility] - Cardiac Intensive Care

## 2022-07-06 NOTE — NURSING
0530 VAD Flow 8.0, Power 8.0, PI 2.7. Doppler BP 84 mmhg, CVP13, Scvo2 62%. No headache, no chest pain. MD Kareem, notified of increasing flow and power overnight. Otherwise, patient hemodynamically stable. Per MD Kareem, we will wait for the Labs to make a decision.

## 2022-07-06 NOTE — PLAN OF CARE
Problem: Physical Therapy  Goal: Physical Therapy Goal  Outcome: Met    Eval Completed. Patient functioning at baseline mobility. Discharge from acute PT services

## 2022-07-06 NOTE — PT/OT/SLP RE-EVAL
"Physical Therapy Re-Assessment and Discharge    Patient Name:  Tim Richards   MRN:  [id number]  Admit Date: 6/27/2022  Admitting Diagnosis:  Left ventricular assist device (LVAD) complication   Length of Stay: 9 days  Recent Surgery: * No surgery found *      Hospital Course:  6/29/2022: PT initial evaluation    Please refer to PT initial evaluation for PLOF and social history.  Recommendations:     Discharge Recommendations:  home   Discharge Equipment Recommendations: none   Barriers to discharge: None    Assessment:     Tim Richards is a 56 y.o. male admitted with a medical diagnosis of Left ventricular assist device (LVAD) complication. Patient found alert and cooperative with therapy. Vitals remained stable. Patient ambulated around room and performed standing ADL's with independence. Patient is currently functioning at baseline mobility and does not require further PT services. Patient is appropriate to return home upon hospital discharge.     Problem List: impaired cardiopulmonary response to activity  Rehab Prognosis: Good   Plan:      Discharge  from Acute PT services. Patient functioning at baseline mobility.     Subjective   Communicated with RN prior to session.  Patient found HOB elevated upon PT entry to room, agreeable to evaluation. Tim Richards's family member present during session.    Chief Complaint: no complaints   Patient/Family Comments/goals: to return to PLOF   Pain/Comfort:  · Pain Rating 1: 0/10  · Pain Rating Post-Intervention 1: 0/10    Objective:   Patient found with: central line, peripheral IV, arterial line, pulse ox (continuous), telemetry, LVAD   General Precautions: Standard, fall, contact, droplet, airborne   Orthopedic Precautions:N/A   Braces: N/A   Body mass index is 29.52 kg/m².  Oxygen Device: Room Air  Vitals: BP (!) 88/0 (BP Location: Left arm, Patient Position: Lying)   Pulse 83   Temp 97.6 °F (36.4 °C) (Oral)   Resp (!) 26   Ht 6' 1" (1.854 m)   Wt " 101.5 kg (223 lb 12.3 oz)   SpO2 96%   BMI 29.52 kg/m²     Exams:  · Cognition:   · Alert and Cooperative  · AxOx4  · Command following: Follows multistep  commands  · Fluency: clear/fluent  · Hearing: Intact    · RLE ROM: WFL  · RLE Strength: at least grossly 4+/5 due to ability to hold weight against gravity and ambulate independently   · LLE ROM: WFL  · LLE Strength: at least grossly 4+/5 due to ability to hold weight against gravity and ambulate independently     Outcome Measures:  AM-PAC 6 CLICK MOBILITY  Turning over in bed (including adjusting bedclothes, sheets and blankets)?: 4  Sitting down on and standing up from a chair with arms (e.g., wheelchair, bedside commode, etc.): 4  Moving from lying on back to sitting on the side of the bed?: 4  Moving to and from a bed to a chair (including a wheelchair)?: 4  Need to walk in hospital room?: 4  Climbing 3-5 steps with a railing?: 4  Basic Mobility Total Score: 24     Functional Mobility:  Additional staff present: OT and Supervising PT  Bed Mobility:   · Supine to Sit: independence    Transfers:   · Sit <> Stand Transfer: independence with no assistive device   · From EOB       Gait:  · Patient ambulated: 10ft + 20ft  With standing ADL's in between   · Patient required: independent  · Patient used:  No Assistive Device  · Gait Pattern observed: reciprocal gait  · Comments:   · Ambulation trial was confined to room distances due to adherence to COVID precautions  · Patient showed no deviation from baseline      Therapeutic Activities, Exercises, Education:   Patient educated on PT role and POC.  Patient educated on importance of daily ambulation and OOB activity.   Patient verbalized understanding.         Patient left up in chair with all lines intact, call button in reach and RN notified..    GOALS:   Multidisciplinary Problems     Physical Therapy Goals        Problem: Physical Therapy    Goal Priority Disciplines Outcome Goal Variances Interventions    Physical Therapy Goal     PT, PT/OT Ongoing, Progressing               Multidisciplinary Problems (Resolved)        Problem: Physical Therapy    Goal Priority Disciplines Outcome Goal Variances Interventions   Physical Therapy Goal   (Resolved)     PT, PT/OT Met     Description: The patient is near his functional baseline, he ambulated within the room with no AD and independence. Unable to do stair training, assess gait endurance due to COVID restrictions. He is safe to return home with no therapy needs. He is in agreement with PT discharge, he states he is confident he can ascend/descend his stairs.           Problem: Physical Therapy    Goal Priority Disciplines Outcome Goal Variances Interventions   Physical Therapy Goal   (Resolved)     PT, PT/OT Met                    Time Tracking:     PT Received On: 07/06/22  PT Start Time: 1350     PT Stop Time: 1411  PT Total Time (min): 21 min     Billable Minutes: Re-eval 10 and Gait Training 11

## 2022-07-06 NOTE — PROGRESS NOTES
"RN sitting outside of pod when all of the sudden LVAD disconnect alarm began alarming. RN immediately ran into the room and witnessed that the patient had the white LVAD power cable in his hands. The alarm had ceased prior to RN entering room. Alarm went off for approx 5 seconds. Upon entering room, RN asked patient "what was the alarm?" because it had stopped alarming by the time the RN got in the room. Patient laughed and stated "oh dont worry that was me." RN questioned patient and asked if he had disconnected the white power cord himself? Patient admitted to RN, "yes I did it to get your attention". Call light next to patient and within reach. RN asked patient why he didn't use the call light and there was no response from the patient. RN educated patient to use the call light and under NO circumstances is he to ever disconnect himself from the LVAD. Patient visibly laughed and stated "I do this all of the time". RN, again, educated patient that disconnecting himself from the LVAD to get the nurses attention is highly inappropriate and that he should use the call light or yell for help if the call light is not in reach. Patient laughed again. Charge RN, Jesica, notified of incident. Jack, VAD coordinator, also notified. Patient educated twice with no verbalization of understanding.   "

## 2022-07-06 NOTE — SUBJECTIVE & OBJECTIVE
Interval History:   No acute events overnight  Urine has now darkened compared to prior.  CVP is 14    Continuous Infusions:   eptifibatide 1 mcg/kg/min (07/06/22 1305)    heparin (porcine) in D5W 18 Units/kg/hr (07/06/22 1330)     Scheduled Meds:   allopurinoL  100 mg Oral Daily    amiodarone  400 mg Oral Daily    amLODIPine  10 mg Oral Daily    aspirin  325 mg Oral Daily    busPIRone  5 mg Oral TID    levothyroxine  112 mcg Oral Before breakfast    LIDOcaine  1 patch Transdermal Q24H    mexiletine  250 mg Oral Q8H    pantoprazole  40 mg Oral Daily with lunch     PRN Meds:acetaminophen, ALPRAZolam, sodium chloride 0.9%, zolpidem    Review of patient's allergies indicates:   Allergen Reactions    Lisinopril Anaphylaxis    Hydralazine analogues      Chronic constipation, impotence, dizziness     Objective:     Vital Signs (Most Recent):  Temp: 97.6 °F (36.4 °C) (07/06/22 1105)  Pulse: 83 (07/06/22 1305)  Resp: (!) 26 (07/06/22 1305)  BP: (!) 88/0 (07/06/22 1105)  SpO2: 96 % (07/06/22 1105)   Vital Signs (24h Range):  Temp:  [97.6 °F (36.4 °C)-98.3 °F (36.8 °C)] 97.6 °F (36.4 °C)  Pulse:  [77-92] 83  Resp:  [18-40] 26  SpO2:  [93 %-96 %] 96 %  BP: (70-96)/(0-73) 88/0  Arterial Line BP: (72-94)/(62-86) 81/73     Patient Vitals for the past 72 hrs (Last 3 readings):   Weight   07/05/22 1100 101.5 kg (223 lb 12.3 oz)   07/04/22 0400 95.5 kg (210 lb 8.6 oz)     Body mass index is 29.52 kg/m².      Intake/Output Summary (Last 24 hours) at 7/6/2022 1422  Last data filed at 7/6/2022 1305  Gross per 24 hour   Intake 1670.93 ml   Output 1675 ml   Net -4.07 ml       Hemodynamic Parameters:       Telemetry: No events on telemetry    Physical Exam  Constitutional:       General: He is not in acute distress.     Appearance: He is obese.   HENT:      Head: Normocephalic.      Mouth/Throat:      Mouth: Mucous membranes are moist.   Eyes:      Extraocular Movements: Extraocular movements intact.   Cardiovascular:      Rate and  Rhythm: Normal rate and regular rhythm.      Comments: VAD hum appreciated  Pulses detected via doppler  Pulmonary:      Effort: No respiratory distress.      Breath sounds: Normal breath sounds.      Comments: Mild tachypnea  Abdominal:      General: Bowel sounds are normal. There is no distension.      Palpations: Abdomen is soft.      Tenderness: There is no abdominal tenderness.   Musculoskeletal:      Cervical back: Neck supple.   Skin:     General: Skin is warm.   Neurological:      General: No focal deficit present.      Mental Status: He is oriented to person, place, and time.   Psychiatric:         Mood and Affect: Mood normal.         Behavior: Behavior normal.       Significant Labs:  CBC:  Recent Labs   Lab 07/04/22  0330 07/05/22  0400 07/06/22  0442   WBC 6.91 6.80 6.19   RBC 4.00* 3.87* 3.68*   HGB 10.7* 10.4* 9.7*   HCT 34.4* 33.4* 30.9*    193 172   MCV 86 86 84   MCH 26.8* 26.9* 26.4*   MCHC 31.1* 31.1* 31.4*     BNP:  No results for input(s): BNP in the last 168 hours.    Invalid input(s): BNPTRIAGELBLO  CMP:  Recent Labs   Lab 07/02/22  0648 07/02/22  1303 07/03/22  0318 07/03/22  0904 07/04/22  0330 07/04/22  0825 07/05/22  0400 07/05/22  0851 07/05/22  1322 07/05/22  1838 07/06/22  0442   GLU 95   < > 98   < > 100  --  87  --   --  115* 82   CALCIUM 10.2   < > 9.9   < > 10.0  --  10.0  --   --  9.8 9.7   ALBUMIN 3.2*  --  3.1*  --  3.2*  --   --   --   --   --   --    PROT 8.0  --  7.8  --  8.1  --   --   --   --   --   --    *   < > 133*   < > 132*  --  132*  --   --  131* 133*   K 3.5   < > 3.4*   < > 3.4*   < > 3.4*   < > 4.1 4.3 3.7   CO2 30*   < > 29   < > 28  --  29  --   --  25 26   CL 91*   < > 94*   < > 94*  --  93*  --   --  97 97   BUN 42*   < > 40*   < > 37*  --  33*  --   --  33* 29*   CREATININE 2.2*   < > 2.0*   < > 2.3*  --  1.9*  --   --  2.1* 2.0*   ALKPHOS 97  --  96  --  102  --   --   --   --   --   --    ALT 67*  --  58*  --  57*  --   --   --   --   --   --     *  --  106*  --  101*  --   --   --   --   --   --    BILITOT 3.3*  --  3.3*  --  3.0*  --   --   --   --   --   --     < > = values in this interval not displayed.      Coagulation:   Recent Labs   Lab 07/02/22  0649 07/02/22  1048 07/03/22  0318 07/04/22  0330 07/05/22  0400 07/06/22  0442 07/06/22  1333   INR 1.6*  --  1.4* 1.3*  --   --   --    APTT  --    < > 41.5* 39.1* 39.0* 38.3* 40.4*    < > = values in this interval not displayed.     LDH:  Recent Labs   Lab 07/04/22  0330 07/05/22  0642 07/06/22  0827   LDH 1,899* 1,661* 1,429*     Microbiology:  Microbiology Results (last 7 days)       ** No results found for the last 168 hours. **            I have reviewed all pertinent labs within the past 24 hours.    Estimated Creatinine Clearance: 52.2 mL/min (A) (based on SCr of 2 mg/dL (H)).    Diagnostic Results:  I have reviewed and interpreted all pertinent imaging results/findings within the past 24 hours.

## 2022-07-06 NOTE — PLAN OF CARE
Problem: Occupational Therapy  Goal: Occupational Therapy Goal  Outcome: Met       Once medically stable, pt safe to dc home with no further OT needs anticipated.

## 2022-07-06 NOTE — PT/OT/SLP EVAL
"Occupational Therapy   Re-Assessment / Treatment    Patient Name:  Tim Richards   MRN:  [id number]  Admit Date: 6/27/2022  Admitting Diagnosis:  Left ventricular assist device (LVAD) complication   Length of Stay: 9 days  Recent Surgery: * No surgery found *      Hospital Course:  6/27: Admit date  6/29: OT initial evaluation, pt d/c 2* independent  Re-Eval complete this date 2* transfer to ICU    OT recommendation:  Home no needs  Please refer to OT initial evaluation for PLOF, OP and social history.    Recommendations:     Discharge Recommendations: home  Discharge Equipment Recommendations:  none  Barriers to discharge:  None    Assessment:     Tim Richards is a 56 y.o. male with a medical diagnosis of Left ventricular assist device (LVAD) complication. At this time, patient is functioning at their prior level of function and does not require further acute OT services.     Plan:     During this hospitalization, patient does not require further acute OT services.  Please re-consult if situation changes.    · Plan of Care Reviewed with: patient, spouse    Subjective     Communicated with RN prior to session.  Patient found up in chair upon OT entry to room, agreeable to evaluation.     Chief Complaint: Shortness of Breath, Diarrhea, and Vomiting (Lvad cood caden arshad notifies)    Patient comments/goals: "i'm doing just fine"     Pain/Comfort:  · Pain Rating 1: 0/10  · Pain Rating Post-Intervention 1: 0/10    Objective:   Patient found with: central line, LVAD, peripheral IV, pulse ox (continuous), telemetry, arterial line     General Precautions: Standard, Cardiac fall, LVAD   Orthopedic Precautions:N/A   Braces: N/A   Respiratory Status:    Room air  Vitals: BP (!) 88/0 (BP Location: Left arm, Patient Position: Lying)   Pulse 86   Temp 97.6 °F (36.4 °C) (Oral)   Resp 20   Ht 6' 1" (1.854 m)   Wt 101.5 kg (223 lb 12.3 oz)   SpO2 96%   BMI 29.52 kg/m²     Cognitive and Psychosocial Function: "   · AxOx4 --    · Follows Commands/attention: follows two-step commands  · Communication: clear/fluent  · Memory: No Deficits noted  · Safety awareness/insight to disability: intact   · Mood/Affect/Coping skills/emotional control: Appropriate to situation    Hearing: Intact    Vision:  Intact visual fields and wears glasses     Physical Exam:  Postural examination/scapula alignment:    -       Rounded shoulders  -       Forward head  Skin integrity: Visible skin intact    BUE WFL for strength, sensation, GM/FM for use during functional tasks.  Pt is R handed.      Occupational Performance:  Bed Mobility:    · Patient seated in chair upon entry    Functional Mobility/Transfers:   Patient completed Sit <> Stand Transfer with independence  with  no assistive device    Static Standing Balance: ind at sink for standing ADLs   Dynamic Standing Balance: ind   Patient completed Bed <> Chair Transfer using Step Transfer technique with independence with no assistive device    Activities of Daily Living:  · Feeding:  independence BUE WFL  · Grooming: independence at sink in standing  · Upper Body Dressing: modified independence set up seated EOB  · Lower Body Dressing: modified independence set up seated EOB      AMPAC 6 Click ADL:  AMPAC Total Score: 24    Treatment & Education:  Pt demonstrating independence with functional mobility, ADLs, and LVAD management at this time. Education provided on OT role, continued importance of OOB mobility 3-4x/day, and maintaining engagement in ADLs during hospital admission, with pt verbalizing understanding. Will d/c OT orders at this time as pt is at their functional baseline. Please re-consult if there is a change in functional status.       Patient left up in chair with all lines intact, call button in reach, RN notified and spouse present    GOALS:   Multidisciplinary Problems     Occupational Therapy Goals     Not on file          Multidisciplinary Problems (Resolved)         Problem: Occupational Therapy    Goal Priority Disciplines Outcome Interventions   Occupational Therapy Goal   (Resolved)     OT, PT/OT Met           Problem: Occupational Therapy    Goal Priority Disciplines Outcome Interventions   Occupational Therapy Goal   (Resolved)     OT, PT/OT Met                    History:     Past Medical History:   Diagnosis Date    Anticoagulant long-term use     CHF (congestive heart failure)     Class 1 obesity due to excess calories with serious comorbidity and body mass index (BMI) of 31.0 to 31.9 in adult     Dilated cardiomyopathy 1/10/2018    Disorder of kidney and ureter     CKD    Encounter for blood transfusion     Gout     HTN (hypertension)     Hx of psychiatric care     ICD (implantable cardioverter-defibrillator) infection 7/1/2020    Psychiatric problem     Thyroid disease     Ventricular tachycardia (paroxysmal)        Past Surgical History:   Procedure Laterality Date    CARDIAC CATHETERIZATION  Dec. 2012    CARDIAC DEFIBRILLATOR PLACEMENT Left     CRRT-D    COLONOSCOPY N/A 3/6/2018    Procedure: COLONOSCOPY;  Surgeon: Alonso Bone MD;  Location: [facility] ([address] [place] [address]);  Service: Endoscopy;  Laterality: N/A;    COLONOSCOPY N/A 7/17/2019    Procedure: COLONOSCOPY;  Surgeon: Blane Valdez MD;  [facility] ([facility]  Service: Endoscopy;  Laterality: N/A;    COLONOSCOPY N/A 7/18/2019    Procedure: COLONOSCOPY;  Surgeon: Blane Valdez MD;  [facility]  Service: Endoscopy;  Laterality: N/A;    ESOPHAGOGASTRODUODENOSCOPY N/A 7/17/2019    Procedure: EGD (ESOPHAGOGASTRODUODENOSCOPY);  Surgeon: Blane Valdez MD;  Location: [facility] ([address] [place] [address]);  Service: Endoscopy;  Laterality: N/A;    ESOPHAGOGASTRODUODENOSCOPY N/A 7/18/2019    Procedure: EGD (ESOPHAGOGASTRODUODENOSCOPY);  Surgeon: Blane Valdez MD;  [facility]  Service: Endoscopy;  Laterality: N/A;    NONINVASIVE CARDIAC ELECTROPHYSIOLOGY STUDY N/A 10/18/2019    Procedure:  CARDIAC ELECTROPHYSIOLOGY STUDY, NONINVASIVE;  Surgeon: Raz Wagner MD;  Location: [facility];  Service: Cardiology;  Laterality: N/A;  VT, DFTs, MDT CRTD in situ, LVAD, LASHELL pizarro, 3098    REPLACEMENT OF IMPLANTABLE CARDIOVERTER-DEFIBRILLATOR (ICD) GENERATOR N/A 3/9/2020    Procedure: REPLACEMENT, ICD GENERATOR;  Surgeon: Harry Yun MD;  Location: [facility];  Service: Cardiology;  Laterality: N/A;  VT, ICD Gen Change and Lead Revision, MDT, MAC, DM,3 Prep    REVISION OF IMPLANTABLE CARDIOVERTER-DEFIBRILLATOR (ICD) ELECTRODE LEAD PLACEMENT N/A 3/9/2020    Procedure: REVISION, INSERTION, ELECTRODE LEAD, ICD;  Surgeon: Harry Yun MD;  Location: [facility];  Service: Cardiology;  Laterality: N/A;  VT, ICD Gen Change and Lead Revision, MDT, MAC, DM,3 Prep    TREATMENT OF CARDIAC ARRHYTHMIA  10/18/2019    Procedure: Cardioversion or Defibrillation;  Surgeon: Raz Wagner MD;  Location: [facility];  Service: Cardiology;;       Time Tracking:     OT Date of Treatment: 07/06/22  OT Start Time: 1340  OT Stop Time: 1412  OT Total Time (min): 32 min  Additional staff present: PT      Billable Minutes:Evaluation 9  Self Care/Home Management 23      7/6/2022

## 2022-07-06 NOTE — NURSING
1715: pt ambulated from toilet back to bed. MAP on vicky noted to be in the high 80's/ 90's. Doppler pressure taken at this time. Doppler pressure 90/0. pt denies any pain or headache but does voice frustration about current plan of care. Dr. Martinez informed. No additional BP med ordered, xanax given. Will continue to monitor.     ..  CICU DAILY GOALS   7/5/2022    A: Awake    RASS: Goal - RASS Goal: 0-->alert and calm  Actual - RASS (Newman Agitation-Sedation Scale): 0-->alert and calm   Restraint necessity:    B: Breath   SBT: Not intubated   C: Coordinate A & B, analgesics/sedatives   Pain: managed    SAT: Not intubated  D: Delirium   CAM-ICU: Overall CAM-ICU: Negative  E: Early(intubated/ Progressive (non-intubated) Mobility   MOVE Screen: Pass   Activity: Activity Management:  (assisted back to bed)  FAS: Feeding/Nutrition   Diet order: Diet/Nutrition Received: low saturated fat/low cholesterol, 2 gram sodium,   Fluid restriction: Fluid Requirement: 1500mL FR  T: Thrombus   DVT prophylaxis: VTE Required Core Measure: Pharmacological prophylaxis initiated/maintained  H: HOB Elevation   Head of Bed (HOB) Positioning: HOB at 30-45 degrees  U: Ulcer Prophylaxis   GI: yes  G: Glucose control   managed    S: Skin   Bundle compliance: yes   Bathing/Skin Care: dressed/undressed, bath, complete, electrode patches/site rotation, linen changed, shaved face Date: 7/5/2022  B: Bowel Function   no issues   I: Indwelling Catheters   [place] necessity:     CVC necessity: Yes   IPAD offered: No  D: De-escalation Antibx   No  Plan for the day   Monitor hemodynamics, monitor HM2, draw blood  Family/Goals of care/Code Status   Code Status: Full Code     No acute events throughout day, VS and assessment per flow sheet, patient progressing towards goals as tolerated, plan of care reviewed with Tim Richards and family, all concerns addressed, will continue to monitor.    CVP 11,9,9  HM2 putnam ranged from 6.5-7.8  No low flow  alarms or issues with VAD.

## 2022-07-06 NOTE — PROGRESS NOTES
Update     LCSW called PT's cell (ph: [phone])  and spoke to his wife Kelly who Pt presents as AAO x4, calm, cooperative, and asking and answering questions appropriately. Per Pt's wife, Pt is unable to speak on the phone at this time. Kelly states they are doing well and she has been taking care of her self, and her employer is allowing her to work remotely during this time. LCSW confirmed that Pt's new insurance [employer] is listed in the computer. Kelly states she does not have any additional needs or concerns at this time, LCSW provided information on how to contact him if any concerns arise. SW providing ongoing psychosocial, counseling, & emotional support, education, resources, assistance, and discharge planning as indicated.  SW to continue to follow.

## 2022-07-06 NOTE — ASSESSMENT & PLAN NOTE
-Usually an elevated LDH is a red flag in a patient with LVAD, especially HM2 given the risk of pump thrombosis. PI, power and flow are elevated when compared to prior clinic visits  -LDH is now downtrending, but there is continued concern for red cell shedding  -Haptoglobin low (although it has been chronically low)  -Direct and indirect wendy test are negative  -Plasma free hemoglobin elevated  - concerned that he could have some degree of thrombosis, since adding a second antiplatelet agent didn't resolved it, Integrilin with a Heparin drip were started until we have lab confirmation that this issue that resolved (normalization of LDH etc)  -Integrilin at 1 mcg/kg/min  -Heparin drip  -Aspirin 325mg daily    Procedure: Device Interrogation Including analysis of device parameters  Current Settings: Ventricular Assist Device    TXP LVAD INTERROGATIONS 7/6/2022 7/6/2022 7/6/2022 7/6/2022 7/6/2022 7/6/2022 7/6/2022   Type HeartMate II HeartMate II HeartMate II HeartMate II HeartMate II HeartMate II HeartMate II   Flow 8.8 9 8.0 8.8 8.3 7 7.4   Speed 9800 9800 9800 9800 9800 9800 9800   PI 2.6 2.8 2.7 2.5 2.5 3.1 2.7   Power ([place]) 8.4 8.2 7.9 8.4 7.1 7.4 7.8   LSL 9400 9400 9400 9400 9400 9400 9400   Pulsatility Intermittent pulse Intermittent pulse Intermittent pulse Intermittent pulse Intermittent pulse Intermittent pulse Intermittent pulse

## 2022-07-06 NOTE — PLAN OF CARE
Recommendations  1. Continue cardiac diet-fluid per MD     2. If po intake declines <50% add Optisource BID (indicated for optimization for protein and calorie intake)   3. RD following     Goals: Continue to meet % EEN/EPN by RD f/u date  Nutrition Goal Status: progressing towards goal  Communication of RD Recs: other (POC)

## 2022-07-06 NOTE — PLAN OF CARE
Cardiac ICU Care Plan    POC reviewed with Tim Richards and family. CT today. VSS, working w/ PT/OT. No VAD alarms this shift. Pt progressing toward goals. Will continue to monitor. See below and flowsheets for full assessment and VS info.       Neuro:  [place]ell Coma Scale  Best Eye Response: 4-->(E4) spontaneous  Best Motor Response: 6-->(M6) obeys commands  Best Verbal Response: 5-->(V5) oriented  [place]ell Coma Scale Score: 15  Assessment Qualifiers: patient not sedated/intubated  Pupil PERRLA: yes    24 hr Temp:  [97.6 °F (36.4 °C)-98.3 °F (36.8 °C)]      CV:  Rhythm: normal sinus rhythm   DVT prophylaxis: VTE Required Core Measure: Pharmacological prophylaxis initiated/maintained    CVP (mean): 12 mmHg (07/06/22 1505)       SVO2 (%): 63 % (07/05/22 0400)       Type: HeartMate II       Pulses  Right Radial Pulse: Doppler  Left Radial Pulse: Doppler  Right Dorsalis Pedis Pulse: Doppler  Left Dorsalis Pedis Pulse: Doppler  Right Posterior Tibial Pulse: Doppler  Left Posterior Tibial Pulse: Doppler    Resp:  O2 Device (Oxygen Therapy): room air  Flow (L/min): 2  Oxygen Concentration (%): 95    GI/:  GI prophylaxis: yes  Diet/Nutrition Received: 2 gram sodium, low saturated fat/low cholesterol  Last Bowel Movement: 07/05/22  Voiding Characteristics: voids spontaneously without difficulty   Intake/Output Summary (Last 24 hours) at 7/6/2022 1833  Last data filed at 7/6/2022 1805  Gross per 24 hour   Intake 1863.67 ml   Output 1915 ml   Net -51.33 ml          Labs/Accuchecks:  Recent Labs   Lab 07/04/22  0330 07/05/22  0400 07/06/22  0442   WBC 6.91 6.80 6.19   RBC 4.00* 3.87* 3.68*   HGB 10.7* 10.4* 9.7*   HCT 34.4* 33.4* 30.9*    193 172      Recent Labs   Lab 07/02/22  0649 07/02/22  1048 07/03/22  0318 07/04/22  0330 07/05/22  0400 07/06/22  0442 07/06/22  1333   INR 1.6*  --  1.4* 1.3*  --   --   --    APTT  --    < > 41.5* 39.1* 39.0* 38.3* 40.4*    < > = values in this interval not displayed.       Recent Labs     07/04/22  0330 07/04/22  0825 07/06/22  1748   *   < > 132*   K 3.4*   < > 4.4   CO2 28   < > 24   CL 94*   < > 99   BUN 37*   < > 28*   CREATININE 2.3*   < > 2.1*   ALKPHOS 102  --   --    ALT 57*  --   --    *  --   --    BILITOT 3.0*  --   --     < > = values in this interval not displayed.     No results for input(s): CPK, CPKMB, MB, TROPONINI in the last 168 hours.   Recent Labs     07/04/22  0900 07/05/22  0423 07/06/22  0440   PH 7.427 7.414 7.396   PCO2 49.7* 50.2* 49.4*   PO2 26* 33* 33*   HCO3 32.8* 32.1* 30.3*   POCSATURATED 48* 63* 62*   BE 8 8 5       Electrolytes: Electrolytes replaced  Accuchecks: none    Gtts/LDAs:   eptifibatide 1 mcg/kg/min (07/06/22 1805)    heparin (porcine) in D5W 18 Units/kg/hr (07/06/22 1805)       Lines/Drains/Airways       Central Venous Catheter Line  Duration             Percutaneous Central Line Insertion/Assessment - Triple Lumen  07/02/22 1522 right internal jugular 4 days              Arterial Line  Duration             Arterial Line 07/02/22 1545 Left Radial 4 days              Line  Duration                  VAD 03/08/18 1124 Left ventricular assist device HeartMate II 1581 days              Peripheral Intravenous Line  Duration                  Midline Catheter Insertion/Assessment  - Single Lumen 06/28/22 1027 Right cephalic vein (lateral side of arm) 20g x 8cm 8 days                    Skin/Wounds  Bathing/Skin Care: dressed/undressed;bath, complete;electrode patches/site rotation;linen changed;shaved face (07/05/22 1330)  Wounds: No  Wound care consulted: No

## 2022-07-06 NOTE — ASSESSMENT & PLAN NOTE
WAGNER on CKD  Creatinine is 2.0 today with baseline of 1.5-1.9.  - Avoiding nephrotoxic medications  - Continue to monitor  - Strict I/O's

## 2022-07-06 NOTE — CARE UPDATE
CICU DAILY GOALS       A: Awake    RASS: Goal - RASS Goal: 0-->alert and calm  Actual - RASS (Newman Agitation-Sedation Scale): 0-->alert and calm   Restraint necessity:    B: Breath   SBT: Not intubated   C: Coordinate A & B, analgesics/sedatives   Pain: managed    SAT: Not intubated  D: Delirium   CAM-ICU: Overall CAM-ICU: Negative  E: Early(intubated/ Progressive (non-intubated) Mobility   MOVE Screen: Pass   Activity: Activity Management: Rolling - L1  FAS: Feeding/Nutrition   Diet order: Diet/Nutrition Received: 2 gram sodium, low saturated fat/low cholesterol,   Fluid restriction: Fluid Requirement: 1500ml FR  T: Thrombus   DVT prophylaxis: VTE Required Core Measure: Pharmacological prophylaxis initiated/maintained  H: HOB Elevation   Head of Bed (HOB) Positioning: HOB at 30 degrees  U: Ulcer Prophylaxis   GI: yes  G: Glucose control   managed    S: Skin   Bundle compliance: yes   Bathing/Skin Care: dressed/undressed, bath, complete, electrode patches/site rotation, linen changed, shaved face Date:   B: Bowel Function   no issues   I: Indwelling Catheters   [place] necessity:     CVC necessity: Yes     IPAD offered: Yes  D: De-escalation Antibx     Plan for the day    Family/Goals of care/Code Status   Code Status: Full Code     No acute events throughout day, VS and assessment per flow sheet, patient progressing towards goals as tolerated, plan of care reviewed with Tim Richards and family, all concerns addressed, will continue to monitor. CVP 10,14, 13. SCVO2 62 %. VAD flow and power increace over night. See other note regarding night. Heparin and integrelin continued.

## 2022-07-06 NOTE — ASSESSMENT & PLAN NOTE
#ADHF  #NICMP  -Admitted with acute decompensated HF presence of HM2 LVAD  -Was placed on lasix gtt but appeared euvolemic and lasix gtt was stopped  -Continue Amlodipine 10mg daily  -Holding Warfarin, Heparin started instead  -Speed echo at 9800 showed bowing to [place] [id number], IVS was at midline, MR decreased but worse AR

## 2022-07-06 NOTE — PROGRESS NOTES
07/06/2022  Estephania Mratinez    Current provider:  Isaiah Santzio MD    Device interrogation:  TXP LVAD INTERROGATIONS 7/6/2022 7/6/2022 7/6/2022 7/6/2022 7/6/2022 7/6/2022 7/6/2022   Type HeartMate II HeartMate II HeartMate II HeartMate II HeartMate II HeartMate II HeartMate II   Flow 7 7.4 8.0 8.1 6.2 6.5 6.4   Speed 9800 9800 9800 9800 9800 9800 9800   PI 3.1 2.7 2.7 2.6 3.4 3.0 3.2   Power (Jackson) 7.4 7.8 8.0 8.1 6.9 7.2 7.0   LSL 9400 9400 9400 9400 9400 9400 9400   Pulsatility Intermittent pulse Intermittent pulse Intermittent pulse Intermittent pulse Intermittent pulse Intermittent pulse Intermittent pulse          Rounded on Tim Richards to ensure all mechanical assist device settings (IABP or VAD) were appropriate and all parameters were within limits.  I was able to ensure all back up equipment was present, the staff had no issues, and the Perfusion Department daily rounding was complete.      For implantable VADs: Interrogation of Ventricular assist device was performed with analysis of device parameters and review of device function. I have personally reviewed the interrogation findings and agree with findings as stated.     In emergency, the nursing units have been notified to contact the perfusion department either by:  Calling [id number] from 630am to 4pm Mon thru Fri, utilizing the On-Call Finder functionality of Epic and searching for Perfusion, or by contacting the hospital  from 4pm to 630am and on weekends and asking to speak with the perfusionist on call.    8:47 AM

## 2022-07-06 NOTE — NURSING
Rounded on patient. Wife at bedside. LDH remains elevated, peaked at 1899 and is at 1429 today. Power is now in the 8s. Urine is tea colored. Pt is on heparin and integrillin gtts. Noncontrast CT done for suspicion of thrombosis, unable to assess due to not using contrast.     I spoke in length with patient and wife reviewing if the patient has to undergo a pump exchange. Recovering time, education, driveline, dressing change, etc.      Waveforms sent to [employer]:    Below is a summary of the Heart Mate log file for your review.  There are also low supply voltage values when connected to the power module.   Please replace the patient cable & send us the lot # of the old cable.  There were no other unusual events recorded in the log file event history.    The MCS equipment is operating as intended.  The pump settings, operating ranges & event history are listed below.  The pump parameters trending can be viewed in the graph below.  Please turn on the logging feature and set it to a 1 hour interval   so more data points can be collected until this issue is resolved.     Patient ID RBB   Controller  7.29   Date range 6/9/2022 9:37 7/6/2022 4:16   Fixed Speed 9800   Low Speed Limit 9400   Actual Speed Avg 9783   Power max 9.4   Power min 6.2   Power avg 7.6   Flow max 10.3   Flow min 5.2   Flow avg 7.5   PI max 4.7   PI min 1.4   PI avg 3.1   PI event total 87

## 2022-07-06 NOTE — ASSESSMENT & PLAN NOTE
Procedure: Device Interrogation Including analysis of device parameters  Current Settings: Ventricular Assist Device  Review of device function is stable    TXP LVAD INTERROGATIONS 7/6/2022 7/6/2022 7/6/2022 7/6/2022 7/6/2022 7/6/2022 7/6/2022   Type HeartMate II HeartMate II HeartMate II HeartMate II HeartMate II HeartMate II HeartMate II   Flow 8.8 9 8.0 8.8 8.3 7 7.4   Speed 9800 9800 9800 9800 9800 9800 9800   PI 2.6 2.8 2.7 2.5 2.5 3.1 2.7   Power (Jackson) 8.4 8.2 7.9 8.4 7.1 7.4 7.8   LSL 9400 9400 9400 9400 9400 9400 9400   Pulsatility Intermittent pulse Intermittent pulse Intermittent pulse Intermittent pulse Intermittent pulse Intermittent pulse Intermittent pulse

## 2022-07-06 NOTE — PROGRESS NOTES
"[facility] - Cardiac Intensive Care  Adult Nutrition  Progress Note    SUMMARY       Recommendations  1. Continue cardiac diet-fluid per MD     2. If po intake declines <50% add Optisource BID (indicated for optimization for protein and calorie intake)    3. RD following    Goals: Continue to meet % EEN/EPN by RD f/u date  Nutrition Goal Status: progressing towards goal  Communication of RD Recs: other (POC)    Assessment and Plan    Nutrition Problem  Increased protein needs      Related to (etiology):   Physiological demands     Signs and Symptoms (as evidenced by):   LVAD present      Interventions (treatment strategy):  Collaboration of nutrition care w/ other providers      Nutrition Diagnosis Status:   Continues     Reason for Assessment  Reason For Assessment: RD follow-up  Diagnosis: other (COVID-19)  Relevant Medical History: CHF, gout, cardiomyopathy  Interdisciplinary Rounds: attended  General Information Comments: Pt COVID-19 positive since 6/27/22. Pt tolerating diet- pt w/ % average meal consumption since admit. Pt denies any issues w/ chewing/swallowing. Noted 21 lb weight loss since last RD note, likely related to fluid. Pt w/ no s/s of malnutrition at this time.  LBM noted-7/5/22  Nutrition Discharge Planning: cardiac/low sodium diet    Nutrition Risk Screen    Nutrition Risk Screen: no indicators present    Nutrition/Diet History    Spiritual, Cultural Beliefs, Restorationism Practices, Values that Affect Care: no    Anthropometrics    Temp: 98.1 °F (36.7 °C)  Height Method: Stated  Height: 6' 1" (185.4 cm)  Height (inches): 73 in  Weight Method: Bed Scale  Weight: 101.5 kg (223 lb 12.3 oz)  Weight (lb): 223.77 lb  Ideal Body Weight (IBW), Male: 184 lb  % Ideal Body Weight, Male (lb): 121.74 %  BMI (Calculated): 29.5  BMI Grade: 25 - 29.9 - overweight     Lab/Procedures/Meds    Pertinent Labs Reviewed: reviewed  Pertinent Labs Comments: Sodium 133, BUN 29, Cr 2.0, GFR 42.2  Pertinent " Medications Reviewed: reviewed  Pertinent Medications Comments: buspirone    Estimated/Assessed Needs    Weight Used For Calorie Calculations: 101.5 kg (223 lb 12.3 oz)  Energy Calorie Requirements (kcal): 1903 (kcal/day)  Energy Need Method: Clark-St Jeor (PAL 1.0)  Protein Requirements: 122 g (1.2 g/kg)  Weight Used For Protein Calculations: 101.5 kg (223 lb 12.3 oz)  Fluid Requirements (mL): 1 ml or fluid per MD   RDA Method (mL): 1903     Nutrition Prescription Ordered    Current Diet Order: Cardiac diet    Evaluation of Received Nutrient/Fluid Intake    I/O: -9.6 L since admit  Energy Calories Required: meeting needs  Protein Required: meeting needs  Fluid Required: meeting needs  Total Fluid Intake (mL/kg): 1 ml or fluid per MD  Tolerance: tolerating  % Intake of Estimated Energy Needs: %   Meal Intake: %    Nutrition Risk    Level of Risk/Frequency of Follow-up: low ((1 x/week))     Monitor and Evaluation    Food and Nutrient Intake: energy intake, food and beverage intake  Food and Nutrient Adminstration: diet order  Knowledge/Beliefs/Attitudes: food and nutrition knowledge/skill, beliefs and attitudes  Physical Activity and Function: nutrition-related ADLs and IADLs, factors affecting access to physical activity  Anthropometric Measurements: height/length, weight, weight change, body mass index, growth pattern indices/percentile ranks  Biochemical Data, Medical Tests and Procedures: electrolyte and renal panel, gastrointestinal profile, glucose/endocrine profile, inflammatory profile, lipid profile  Nutrition-Focused Physical Findings: overall appearance, extremities, muscles and bones, head and eyes, skin     Nutrition Follow-Up    RD Follow-up?: Yes

## 2022-07-07 LAB
ALLENS TEST: ABNORMAL
ANION GAP SERPL CALC-SCNC: 9 MMOL/L (ref 8–16)
ANION GAP SERPL CALC-SCNC: 9 MMOL/L (ref 8–16)
APTT BLDCRRT: 38.5 SEC (ref 21–32)
APTT BLDCRRT: 39.2 SEC (ref 21–32)
APTT BLDCRRT: 41.6 SEC (ref 21–32)
BASOPHILS # BLD AUTO: 0.01 K/UL (ref 0–0.2)
BASOPHILS NFR BLD: 0.1 % (ref 0–1.9)
BUN SERPL-MCNC: 24 MG/DL (ref 6–20)
BUN SERPL-MCNC: 25 MG/DL (ref 6–20)
CALCIUM SERPL-MCNC: 9.3 MG/DL (ref 8.7–10.5)
CALCIUM SERPL-MCNC: 9.8 MG/DL (ref 8.7–10.5)
CHLORIDE SERPL-SCNC: 100 MMOL/L (ref 95–110)
CHLORIDE SERPL-SCNC: 99 MMOL/L (ref 95–110)
CO2 SERPL-SCNC: 23 MMOL/L (ref 23–29)
CO2 SERPL-SCNC: 23 MMOL/L (ref 23–29)
CREAT SERPL-MCNC: 1.7 MG/DL (ref 0.5–1.4)
CREAT SERPL-MCNC: 1.8 MG/DL (ref 0.5–1.4)
DELSYS: ABNORMAL
DIFFERENTIAL METHOD: ABNORMAL
EOSINOPHIL # BLD AUTO: 0 K/UL (ref 0–0.5)
EOSINOPHIL NFR BLD: 0.5 % (ref 0–8)
ERYTHROCYTE [DISTWIDTH] IN BLOOD BY AUTOMATED COUNT: 15.2 % (ref 11.5–14.5)
EST. GFR  (AFRICAN AMERICAN): 47.9 ML/MIN/1.73 M^2
EST. GFR  (AFRICAN AMERICAN): 51.3 ML/MIN/1.73 M^2
EST. GFR  (NON AFRICAN AMERICAN): 41.4 ML/MIN/1.73 M^2
EST. GFR  (NON AFRICAN AMERICAN): 44.4 ML/MIN/1.73 M^2
FIO2: 21
GLUCOSE SERPL-MCNC: 133 MG/DL (ref 70–110)
GLUCOSE SERPL-MCNC: 96 MG/DL (ref 70–110)
HCO3 UR-SCNC: 29.1 MMOL/L (ref 24–28)
HCT VFR BLD AUTO: 32.1 % (ref 40–54)
HGB BLD-MCNC: 9.8 G/DL (ref 14–18)
IMM GRANULOCYTES # BLD AUTO: 0.04 K/UL (ref 0–0.04)
IMM GRANULOCYTES NFR BLD AUTO: 0.5 % (ref 0–0.5)
LDH SERPL L TO P-CCNC: 1345 U/L (ref 110–260)
LDH SERPL L TO P-CCNC: 1355 U/L (ref 110–260)
LYMPHOCYTES # BLD AUTO: 0.8 K/UL (ref 1–4.8)
LYMPHOCYTES NFR BLD: 10 % (ref 18–48)
MAGNESIUM SERPL-MCNC: 2.1 MG/DL (ref 1.6–2.6)
MAGNESIUM SERPL-MCNC: 2.1 MG/DL (ref 1.6–2.6)
MCH RBC QN AUTO: 26.6 PG (ref 27–31)
MCHC RBC AUTO-ENTMCNC: 30.5 G/DL (ref 32–36)
MCV RBC AUTO: 87 FL (ref 82–98)
MODE: ABNORMAL
MONOCYTES # BLD AUTO: 1 K/UL (ref 0.3–1)
MONOCYTES NFR BLD: 13.4 % (ref 4–15)
NEUTROPHILS # BLD AUTO: 5.7 K/UL (ref 1.8–7.7)
NEUTROPHILS NFR BLD: 75.5 % (ref 38–73)
NRBC BLD-RTO: 0 /100 WBC
PCO2 BLDA: 47.9 MMHG (ref 35–45)
PH SMN: 7.39 [PH] (ref 7.35–7.45)
PLATELET # BLD AUTO: 213 K/UL (ref 150–450)
PMV BLD AUTO: 11.2 FL (ref 9.2–12.9)
PO2 BLDA: 28 MMHG (ref 40–60)
POC BE: 4 MMOL/L
POC SATURATED O2: 52 % (ref 95–100)
POC TCO2: 31 MMOL/L (ref 24–29)
POTASSIUM SERPL-SCNC: 4.1 MMOL/L (ref 3.5–5.1)
POTASSIUM SERPL-SCNC: 4.2 MMOL/L (ref 3.5–5.1)
RBC # BLD AUTO: 3.68 M/UL (ref 4.6–6.2)
SAMPLE: ABNORMAL
SITE: ABNORMAL
SODIUM SERPL-SCNC: 131 MMOL/L (ref 136–145)
SODIUM SERPL-SCNC: 132 MMOL/L (ref 136–145)
WBC # BLD AUTO: 7.59 K/UL (ref 3.9–12.7)

## 2022-07-07 PROCEDURE — 94761 N-INVAS EAR/PLS OXIMETRY MLT: CPT

## 2022-07-07 PROCEDURE — 93750 PR INTERROGATE VENT ASSIST DEV, IN PERSON, W PHYSICIAN ANALYSIS: ICD-10-PCS | Mod: ,,, | Performed by: INTERNAL MEDICINE

## 2022-07-07 PROCEDURE — 27000248 HC VAD-ADDITIONAL DAY

## 2022-07-07 PROCEDURE — 25000003 PHARM REV CODE 250: Performed by: STUDENT IN AN ORGANIZED HEALTH CARE EDUCATION/TRAINING PROGRAM

## 2022-07-07 PROCEDURE — 83615 LACTATE (LD) (LDH) ENZYME: CPT | Performed by: STUDENT IN AN ORGANIZED HEALTH CARE EDUCATION/TRAINING PROGRAM

## 2022-07-07 PROCEDURE — 99900035 HC TECH TIME PER 15 MIN (STAT)

## 2022-07-07 PROCEDURE — 99233 SBSQ HOSP IP/OBS HIGH 50: CPT | Mod: 25,,, | Performed by: INTERNAL MEDICINE

## 2022-07-07 PROCEDURE — 20000000 HC ICU ROOM

## 2022-07-07 PROCEDURE — 63600175 PHARM REV CODE 636 W HCPCS: Performed by: INTERNAL MEDICINE

## 2022-07-07 PROCEDURE — 85730 THROMBOPLASTIN TIME PARTIAL: CPT | Mod: 91 | Performed by: INTERNAL MEDICINE

## 2022-07-07 PROCEDURE — 93750 INTERROGATION VAD IN PERSON: CPT | Mod: ,,, | Performed by: INTERNAL MEDICINE

## 2022-07-07 PROCEDURE — 85025 COMPLETE CBC W/AUTO DIFF WBC: CPT | Performed by: INTERNAL MEDICINE

## 2022-07-07 PROCEDURE — 85730 THROMBOPLASTIN TIME PARTIAL: CPT | Performed by: INTERNAL MEDICINE

## 2022-07-07 PROCEDURE — 99233 PR SUBSEQUENT HOSPITAL CARE,LEVL III: ICD-10-PCS | Mod: 25,,, | Performed by: INTERNAL MEDICINE

## 2022-07-07 PROCEDURE — 83735 ASSAY OF MAGNESIUM: CPT | Mod: 91 | Performed by: INTERNAL MEDICINE

## 2022-07-07 PROCEDURE — 25000003 PHARM REV CODE 250: Performed by: INTERNAL MEDICINE

## 2022-07-07 PROCEDURE — 80048 BASIC METABOLIC PNL TOTAL CA: CPT | Mod: 91 | Performed by: INTERNAL MEDICINE

## 2022-07-07 RX ORDER — FAMOTIDINE 20 MG/1
20 TABLET, FILM COATED ORAL 2 TIMES DAILY PRN
Status: DISCONTINUED | OUTPATIENT
Start: 2022-07-07 | End: 2022-07-13

## 2022-07-07 RX ORDER — FAMOTIDINE 20 MG/1
20 TABLET, FILM COATED ORAL 2 TIMES DAILY
Status: DISCONTINUED | OUTPATIENT
Start: 2022-07-07 | End: 2022-07-07

## 2022-07-07 RX ADMIN — BUSPIRONE HYDROCHLORIDE 5 MG: 5 TABLET ORAL at 03:07

## 2022-07-07 RX ADMIN — MEXILETINE HYDROCHLORIDE 250 MG: 250 CAPSULE ORAL at 03:07

## 2022-07-07 RX ADMIN — HEPARIN SODIUM 18 UNITS/KG/HR: 5000 INJECTION INTRAVENOUS; SUBCUTANEOUS at 03:07

## 2022-07-07 RX ADMIN — LIDOCAINE 1 PATCH: 50 PATCH CUTANEOUS at 12:07

## 2022-07-07 RX ADMIN — HEPARIN SODIUM 20 UNITS/KG/HR: 5000 INJECTION INTRAVENOUS; SUBCUTANEOUS at 08:07

## 2022-07-07 RX ADMIN — AMLODIPINE BESYLATE 10 MG: 10 TABLET ORAL at 08:07

## 2022-07-07 RX ADMIN — FAMOTIDINE 20 MG: 20 TABLET ORAL at 06:07

## 2022-07-07 RX ADMIN — EPTIFIBATIDE 1 MCG/KG/MIN: 0.75 INJECTION INTRAVENOUS at 06:07

## 2022-07-07 RX ADMIN — PANTOPRAZOLE SODIUM 40 MG: 40 TABLET, DELAYED RELEASE ORAL at 12:07

## 2022-07-07 RX ADMIN — FAMOTIDINE 20 MG: 20 TABLET ORAL at 12:07

## 2022-07-07 RX ADMIN — AMIODARONE HYDROCHLORIDE 400 MG: 200 TABLET ORAL at 08:07

## 2022-07-07 RX ADMIN — ALLOPURINOL 100 MG: 100 TABLET ORAL at 08:07

## 2022-07-07 RX ADMIN — MEXILETINE HYDROCHLORIDE 250 MG: 250 CAPSULE ORAL at 09:07

## 2022-07-07 RX ADMIN — EPTIFIBATIDE 1 MCG/KG/MIN: 0.75 INJECTION INTRAVENOUS at 05:07

## 2022-07-07 RX ADMIN — ASPIRIN 325 MG: 325 TABLET, COATED ORAL at 08:07

## 2022-07-07 RX ADMIN — MEXILETINE HYDROCHLORIDE 250 MG: 250 CAPSULE ORAL at 05:07

## 2022-07-07 RX ADMIN — ZOLPIDEM TARTRATE 5 MG: 5 TABLET ORAL at 12:07

## 2022-07-07 RX ADMIN — BUSPIRONE HYDROCHLORIDE 5 MG: 5 TABLET ORAL at 09:07

## 2022-07-07 RX ADMIN — LEVOTHYROXINE SODIUM 112 MCG: 112 TABLET ORAL at 05:07

## 2022-07-07 RX ADMIN — BUSPIRONE HYDROCHLORIDE 5 MG: 5 TABLET ORAL at 08:07

## 2022-07-07 NOTE — ASSESSMENT & PLAN NOTE
WAGNER on CKD  Creatinine is 1.8 today with baseline of 1.5-1.9.  - Avoiding nephrotoxic medications  - Continue to monitor  - Strict I/O's

## 2022-07-07 NOTE — ASSESSMENT & PLAN NOTE
-Usually an elevated LDH is a red flag in a patient with LVAD, especially HM2 given the risk of pump thrombosis. PI, power and flow are elevated when compared to prior clinic visits  -LDH is now downtrending, but there is continued concern for red cell shedding  -Haptoglobin low (although it has been chronically low)  -Direct and indirect wendy test are negative  -Plasma free hemoglobin elevated  - concerned that he could have some degree of thrombosis, since adding a second antiplatelet agent didn't resolved it, Integrilin with a Heparin drip were started until we have lab confirmation that this issue that resolved (normalization of LDH etc)  -Integrilin at 1 mcg/kg/min  -Heparin drip  -Aspirin 325mg daily    Procedure: Device Interrogation Including analysis of device parameters  Current Settings: Ventricular Assist Device    TXP LVAD INTERROGATIONS 7/7/2022 7/7/2022 7/7/2022 7/7/2022 7/7/2022 7/7/2022 7/7/2022   Type HeartMate II HeartMate II HeartMate II HeartMate II HeartMate II HeartMate II HeartMate II   Flow 6.9 7.4 8.2 7.9 8.4 9.2 6.5   Speed 9800 9800 9800 9800 9800 9800 9800   PI 2.9 2.8 3.0 2.9 3 3 2.8   Power (Jackson) 7.3 7.6 8.1 7.7 8.2 8.6 7.1   LSL 9400 9400 9400 9400 9400 9400 9400   Pulsatility Intermittent pulse Intermittent pulse Intermittent pulse Intermittent pulse Intermittent pulse Intermittent pulse Intermittent pulse

## 2022-07-07 NOTE — PLAN OF CARE
Cardiac ICU Care Plan    POC reviewed with Tim Richards and family. Questions and concerns addressed. No acute events today. Pt progressing toward goals. Will continue to monitor. See below and flowsheets for full assessment and VS info.       Neuro:  [place] Coma Scale  Best Eye Response: 4-->(E4) spontaneous  Best Motor Response: 6-->(M6) obeys commands  Best Verbal Response: 5-->(V5) oriented  De[place]ee Coma Scale Score: 15  Assessment Qualifiers: patient not sedated/intubated  Pupil PERRLA: yes    24 hr Temp:  [97.8 °F (36.6 °C)-98.6 °F (37 °C)]      CV:  Rhythm: normal sinus rhythm   DVT prophylaxis: VTE Required Core Measure: Pharmacological prophylaxis initiated/maintained    CVP (mean): 11 mmHg (07/07/22 1505)       SVO2 (%): 63 % (07/05/22 0400)       Type: HeartMate II       Pulses  Right Radial Pulse: Doppler  Left Radial Pulse: Doppler  Right Dorsalis Pedis Pulse: Doppler  Left Dorsalis Pedis Pulse: Doppler  Right Posterior Tibial Pulse: Doppler  Left Posterior Tibial Pulse: Doppler    Resp:  O2 Device (Oxygen Therapy): room air  Flow (L/min): 2  Oxygen Concentration (%): 95    GI/:  GI prophylaxis: yes  Diet/Nutrition Received: 2 gram sodium, low saturated fat/low cholesterol  Last Bowel Movement: 07/05/22  Voiding Characteristics: voids spontaneously without difficulty   Intake/Output Summary (Last 24 hours) at 7/7/2022 1731  Last data filed at 7/7/2022 1715  Gross per 24 hour   Intake 1608.09 ml   Output 1500 ml   Net 108.09 ml          Labs/Accuchecks:  Recent Labs   Lab 07/05/22  0400 07/06/22  0442 07/07/22  0315   WBC 6.80 6.19 7.59   RBC 3.87* 3.68* 3.68*   HGB 10.4* 9.7* 9.8*   HCT 33.4* 30.9* 32.1*    172 213      Recent Labs   Lab 07/02/22  0649 07/02/22  1048 07/03/22  0318 07/04/22  0330 07/05/22  0400 07/06/22  1946 07/07/22  0315 07/07/22  1137   INR 1.6*  --  1.4* 1.3*  --   --   --   --    APTT  --    < > 41.5* 39.1*   < > 40.2* 39.2* 38.5*    < > = values in this interval  not displayed.      Recent Labs     07/07/22  0315   *   K 4.1   CO2 23   CL 99   BUN 25*   CREATININE 1.8*     No results for input(s): CPK, CPKMB, MB, TROPONINI in the last 168 hours.   Recent Labs     07/05/22  0423 07/06/22  0440 07/07/22  0509   PH 7.414 7.396 7.392   PCO2 50.2* 49.4* 47.9*   PO2 33* 33* 28*   HCO3 32.1* 30.3* 29.1*   POCSATURATED 63* 62* 52*   BE 8 5 4       Electrolytes: Electrolytes replaced  Accuchecks: none    Gtts/LDAs:   eptifibatide 1 mcg/kg/min (07/07/22 1705)    heparin (porcine) in D5W 20 Units/kg/hr (07/07/22 1705)       Lines/Drains/Airways       Central Venous Catheter Line  Duration             Percutaneous Central Line Insertion/Assessment - Triple Lumen  07/02/22 1522 right internal jugular 5 days              Arterial Line  Duration             Arterial Line 07/02/22 1545 Left Radial 5 days              Line  Duration                  VAD 03/08/18 1124 Left ventricular assist device HeartMate II 1582 days              Peripheral Intravenous Line  Duration                  Midline Catheter Insertion/Assessment  - Single Lumen 06/28/22 1027 Right cephalic vein (lateral side of arm) 20g x 8cm 9 days                    Skin/Wounds  Bathing/Skin Care: linen changed;dressed/undressed (07/07/22 1505)  Wounds: No  Wound care consulted: No

## 2022-07-07 NOTE — NURSING
VAD flows have been 9.4-10.4L/min. Pt asymptomatic.Called Dr. Kareem DUNN to update. No new changes at this time.

## 2022-07-07 NOTE — PROGRESS NOTES
[facility] - Cardiac [facility]  Heart Transplant  Progress Note    Patient Name: Tim Richards  MRN: [id number]  Admission Date: 6/27/2022  Hospital Length of Stay: 10 days  Attending Physician: Isaiah Santizo MD  Primary Care Provider: Deyanira Booth MD  Principal Problem:Left ventricular assist device (LVAD) complication    Subjective:     Interval History:   No acute events overnight    PT/OT evaluated the patient with no acute needs    Power temporarily increased to 8 overnight    CVP of 11    Continuous Infusions:   eptifibatide 1 mcg/kg/min (07/07/22 1005)    heparin (porcine) in D5W 18 Units/kg/hr (07/07/22 1005)     Scheduled Meds:   allopurinoL  100 mg Oral Daily    amiodarone  400 mg Oral Daily    amLODIPine  10 mg Oral Daily    aspirin  325 mg Oral Daily    busPIRone  5 mg Oral TID    levothyroxine  112 mcg Oral Before breakfast    LIDOcaine  1 patch Transdermal Q24H    mexiletine  250 mg Oral Q8H    pantoprazole  40 mg Oral Daily with lunch     PRN Meds:acetaminophen, ALPRAZolam, sodium chloride 0.9%, zolpidem    Review of patient's allergies indicates:   Allergen Reactions    Lisinopril Anaphylaxis    Hydralazine analogues      Chronic constipation, impotence, dizziness     Objective:     Vital Signs (Most Recent):  Temp: 97.8 °F (36.6 °C) (07/07/22 0705)  Pulse: 84 (07/07/22 1005)  Resp: (!) 22 (07/07/22 1005)  BP: (!) 84/0 (07/07/22 0301)  SpO2: 96 % (07/07/22 0843)   Vital Signs (24h Range):  Temp:  [97.8 °F (36.6 °C)-98.2 °F (36.8 °C)] 97.8 °F (36.6 °C)  Pulse:  [79-92] 84  Resp:  [16-55] 22  SpO2:  [95 %-97 %] 96 %  BP: (80-86)/(0) 84/0  Arterial Line BP: ()/(64-94) 86/79     Patient Vitals for the past 72 hrs (Last 3 readings):   Weight   07/07/22 0400 102.2 kg (225 lb 5 oz)   07/05/22 1100 101.5 kg (223 lb 12.3 oz)     Body mass index is 29.73 kg/m².      Intake/Output Summary (Last 24 hours) at 7/7/2022 1144  Last data filed at 7/7/2022 1005  Gross per 24 hour    Intake 1646.78 ml   Output 1765 ml   Net -118.22 ml       Hemodynamic Parameters:       Telemetry: No events    Physical Exam  Constitutional:       General: He is not in acute distress.     Appearance: He is obese.   HENT:      Head: Normocephalic.      Mouth/Throat:      Mouth: Mucous membranes are moist.   Eyes:      Extraocular Movements: Extraocular movements intact.   Cardiovascular:      Rate and Rhythm: Normal rate and regular rhythm.      Comments: VAD hum appreciated  Pulses detected via doppler  Pulmonary:      Effort: No respiratory distress.      Breath sounds: Normal breath sounds.      Comments: Mild tachypnea  Abdominal:      General: Bowel sounds are normal. There is no distension.      Palpations: Abdomen is soft.      Tenderness: There is no abdominal tenderness.   Musculoskeletal:      Cervical back: Neck supple.   Skin:     General: Skin is warm.   Neurological:      General: No focal deficit present.      Mental Status: He is oriented to person, place, and time.   Psychiatric:         Mood and Affect: Mood normal.         Behavior: Behavior normal.       Significant Labs:  CBC:  Recent Labs   Lab 07/05/22  0400 07/06/22  0442 07/07/22  0315   WBC 6.80 6.19 7.59   RBC 3.87* 3.68* 3.68*   HGB 10.4* 9.7* 9.8*   HCT 33.4* 30.9* 32.1*    172 213   MCV 86 84 87   MCH 26.9* 26.4* 26.6*   MCHC 31.1* 31.4* 30.5*     BNP:  No results for input(s): BNP in the last 168 hours.    Invalid input(s): BNPTRIAGELBLO  CMP:  Recent Labs   Lab 07/02/22  0648 07/02/22  1303 07/03/22  0318 07/03/22  0904 07/04/22  0330 07/04/22  0825 07/06/22  0442 07/06/22  1748 07/07/22  0315   GLU 95   < > 98   < > 100   < > 82 102 96   CALCIUM 10.2   < > 9.9   < > 10.0   < > 9.7 9.6 9.8   ALBUMIN 3.2*  --  3.1*  --  3.2*  --   --   --   --    PROT 8.0  --  7.8  --  8.1  --   --   --   --    *   < > 133*   < > 132*   < > 133* 132* 131*   K 3.5   < > 3.4*   < > 3.4*   < > 3.7 4.4 4.1   CO2 30*   < > 29   < > 28   < >  26 24 23   CL 91*   < > 94*   < > 94*   < > 97 99 99   BUN 42*   < > 40*   < > 37*   < > 29* 28* 25*   CREATININE 2.2*   < > 2.0*   < > 2.3*   < > 2.0* 2.1* 1.8*   ALKPHOS 97  --  96  --  102  --   --   --   --    ALT 67*  --  58*  --  57*  --   --   --   --    *  --  106*  --  101*  --   --   --   --    BILITOT 3.3*  --  3.3*  --  3.0*  --   --   --   --     < > = values in this interval not displayed.      Coagulation:   Recent Labs   Lab 07/02/22  0649 07/02/22  1048 07/03/22  0318 07/04/22  0330 07/05/22  0400 07/06/22  1333 07/06/22  1946 07/07/22  0315   INR 1.6*  --  1.4* 1.3*  --   --   --   --    APTT  --    < > 41.5* 39.1*   < > 40.4* 40.2* 39.2*    < > = values in this interval not displayed.     LDH:  Recent Labs   Lab 07/05/22  0642 07/06/22  0827 07/07/22  0315   LDH 1,661* 1,429* 1,345*     Microbiology:  Microbiology Results (last 7 days)       ** No results found for the last 168 hours. **            I have reviewed all pertinent labs within the past 24 hours.    Estimated Creatinine Clearance: 58.2 mL/min (A) (based on SCr of 1.8 mg/dL (H)).    Diagnostic Results:  I have reviewed and interpreted all pertinent imaging results/findings within the past 24 hours.    Assessment and Plan:     57 Y/O M with Hx of stage D HFrEF (EF 10%) due to NICM underwent HM2 implant 3/8/18 and exchange of outflow graft 3/9/18, Hx VT/VF s/p SICD 2014 presenting with gradual worsening shortness of breath associated with nonpleuritic, nonradiating bilateral 4/10 retrosternal chest pressure pain.  Symptoms began yesterday and have gradually worsened in the last 12 hours.  He does report going to a family picnic with increased consumption of chicken wings, ribs and other salty meat products.  Prior to symptom onset, he reports nausea, nonbloody diarrhea began yesterday and single episode of nonbloody nonbilious vomiting this morning. He also complains of dizziness, lightheadedness, and a mild HA. SOB worsened this  morning prompting him to come to the ED.     In the ED, patient was in respiratory distress requiring BiPAP. MAP 96 and started on Nitro gtt. Work-up revealed WBC 10, K 5.4, Cr 2.5 (baseline 1.9), BNP 1950 (baseline 200-300s), Bili 2.1, LDH 1058, and INR 3.2. Bedside TTE with severely reduced EF, AV not opening, septum bulging into RV. Given IV Lasix 80 mg x1 with only 100-200 mL UOP and dark in color. HM2 interrogated and flows have been 8.5-9 L/min with no alarms or power surges. Cardiology consulted in the ED, dicussed with HTS, and decision made to admit to ICU for further mgmt.       * Left ventricular assist device (LVAD) complication  -Usually an elevated LDH is a red flag in a patient with LVAD, especially HM2 given the risk of pump thrombosis. PI, power and flow are elevated when compared to prior clinic visits  -LDH is now downtrending, but there is continued concern for red cell shedding  -Haptoglobin low (although it has been chronically low)  -Direct and indirect wendy test are negative  -Plasma free hemoglobin elevated  - concerned that he could have some degree of thrombosis, since adding a second antiplatelet agent didn't resolved it, Integrilin with a Heparin drip were started until we have lab confirmation that this issue that resolved (normalization of LDH etc)  -Integrilin at 1 mcg/kg/min  -Heparin drip  -Aspirin 325mg daily    Procedure: Device Interrogation Including analysis of device parameters  Current Settings: Ventricular Assist Device    TXP LVAD INTERROGATIONS 7/7/2022 7/7/2022 7/7/2022 7/7/2022 7/7/2022 7/7/2022 7/7/2022   Type HeartMate II HeartMate II HeartMate II HeartMate II HeartMate II HeartMate II HeartMate II   Flow 6.9 7.4 8.2 7.9 8.4 9.2 6.5   Speed 9800 9800 9800 9800 9800 9800 9800   PI 2.9 2.8 3.0 2.9 3 3 2.8   Power (Jackson) 7.3 7.6 8.1 7.7 8.2 8.6 7.1   LSL 9400 9400 9400 9400 9400 9400 9400   Pulsatility Intermittent pulse Intermittent pulse Intermittent pulse  Intermittent pulse Intermittent pulse Intermittent pulse Intermittent pulse       History of ventricular tachycardia  Patient experienced an episode of VT on 6/30 and experienced an ICD shock thought to be related to hypokalemia (K of 3.1 on 6/30)  - Plan to aggressively replete K, keep K>4 and Mg>2  - Will continue mexiletine 25mg q8hrs and amiodarone 400mg daily    COVID-19  -Previously hypoxic, now off oxygen  -ID was consulted, recommended Remdesivir, course completed    Elevated serum lactate dehydrogenase (LDH)  Concerned for pump thrombosis  Cont to trend  Plan for possible pump exchange vs medical management as above    amiodarone induced hypothyrodism  -Continue levothyroxine 112 mcg     LVAD (left ventricular assist device) present  Procedure: Device Interrogation Including analysis of device parameters  Current Settings: Ventricular Assist Device  Review of device function is stable    TXP LVAD INTERROGATIONS 7/7/2022 7/7/2022 7/7/2022 7/7/2022 7/7/2022 7/7/2022 7/7/2022   Type HeartMate II HeartMate II HeartMate II HeartMate II HeartMate II HeartMate II HeartMate II   Flow 6.9 7.4 8.2 7.9 8.4 9.2 6.5   Speed 9800 9800 9800 9800 9800 9800 9800   PI 2.9 2.8 3.0 2.9 3 3 2.8   Power (Jackson) 7.3 7.6 8.1 7.7 8.2 8.6 7.1   LSL 9400 9400 9400 9400 9400 9400 9400   Pulsatility Intermittent pulse Intermittent pulse Intermittent pulse Intermittent pulse Intermittent pulse Intermittent pulse Intermittent pulse       CKD (chronic kidney disease), stage III  WAGNER on CKD  Creatinine is 1.8 today with baseline of 1.5-1.9.  - Avoiding nephrotoxic medications  - Continue to monitor  - Strict I/O's    Chronic combined systolic and diastolic heart failure  #ADHF  #NICMP  -Admitted with acute decompensated HF presence of HM2 LVAD  -Was placed on lasix gtt but appeared euvolemic and lasix gtt was stopped  -Continue Amlodipine 10mg daily  -Holding Warfarin, Heparin started instead  -Speed echo at 9800 showed bowing to the right  [place] [id number], IVS was at midline, MR decreased but worse AR        Harry Canales MD  Heart Transplant  [facility] - [facility]

## 2022-07-07 NOTE — SUBJECTIVE & OBJECTIVE
Interval History:   No acute events overnight    PT/OT evaluated the patient with no acute needs    Power temporarily increased to 8 overnight    CVP of 11    Continuous Infusions:   eptifibatide 1 mcg/kg/min (07/07/22 1005)    heparin (porcine) in D5W 18 Units/kg/hr (07/07/22 1005)     Scheduled Meds:   allopurinoL  100 mg Oral Daily    amiodarone  400 mg Oral Daily    amLODIPine  10 mg Oral Daily    aspirin  325 mg Oral Daily    busPIRone  5 mg Oral TID    levothyroxine  112 mcg Oral Before breakfast    LIDOcaine  1 patch Transdermal Q24H    mexiletine  250 mg Oral Q8H    pantoprazole  40 mg Oral Daily with lunch     PRN Meds:acetaminophen, ALPRAZolam, sodium chloride 0.9%, zolpidem    Review of patient's allergies indicates:   Allergen Reactions    Lisinopril Anaphylaxis    Hydralazine analogues      Chronic constipation, impotence, dizziness     Objective:     Vital Signs (Most Recent):  Temp: 97.8 °F (36.6 °C) (07/07/22 0705)  Pulse: 84 (07/07/22 1005)  Resp: (!) 22 (07/07/22 1005)  BP: (!) 84/0 (07/07/22 0301)  SpO2: 96 % (07/07/22 0843)   Vital Signs (24h Range):  Temp:  [97.8 °F (36.6 °C)-98.2 °F (36.8 °C)] 97.8 °F (36.6 °C)  Pulse:  [79-92] 84  Resp:  [16-55] 22  SpO2:  [95 %-97 %] 96 %  BP: (80-86)/(0) 84/0  Arterial Line BP: ()/(64-94) 86/79     Patient Vitals for the past 72 hrs (Last 3 readings):   Weight   07/07/22 0400 102.2 kg (225 lb 5 oz)   07/05/22 1100 101.5 kg (223 lb 12.3 oz)     Body mass index is 29.73 kg/m².      Intake/Output Summary (Last 24 hours) at 7/7/2022 1144  Last data filed at 7/7/2022 1005  Gross per 24 hour   Intake 1646.78 ml   Output 1765 ml   Net -118.22 ml       Hemodynamic Parameters:       Telemetry: No events    Physical Exam  Constitutional:       General: He is not in acute distress.     Appearance: He is obese.   HENT:      Head: Normocephalic.      Mouth/Throat:      Mouth: Mucous membranes are moist.   Eyes:      Extraocular Movements: Extraocular movements  intact.   Cardiovascular:      Rate and Rhythm: Normal rate and regular rhythm.      Comments: VAD hum appreciated  Pulses detected via doppler  Pulmonary:      Effort: No respiratory distress.      Breath sounds: Normal breath sounds.      Comments: Mild tachypnea  Abdominal:      General: Bowel sounds are normal. There is no distension.      Palpations: Abdomen is soft.      Tenderness: There is no abdominal tenderness.   Musculoskeletal:      Cervical back: Neck supple.   Skin:     General: Skin is warm.   Neurological:      General: No focal deficit present.      Mental Status: He is oriented to person, place, and time.   Psychiatric:         Mood and Affect: Mood normal.         Behavior: Behavior normal.       Significant Labs:  CBC:  Recent Labs   Lab 07/05/22  0400 07/06/22  0442 07/07/22  0315   WBC 6.80 6.19 7.59   RBC 3.87* 3.68* 3.68*   HGB 10.4* 9.7* 9.8*   HCT 33.4* 30.9* 32.1*    172 213   MCV 86 84 87   MCH 26.9* 26.4* 26.6*   MCHC 31.1* 31.4* 30.5*     BNP:  No results for input(s): BNP in the last 168 hours.    Invalid input(s): BNPTRIAGELBLO  CMP:  Recent Labs   Lab 07/02/22  0648 07/02/22  1303 07/03/22  0318 07/03/22  0904 07/04/22  0330 07/04/22  0825 07/06/22  0442 07/06/22  1748 07/07/22  0315   GLU 95   < > 98   < > 100   < > 82 102 96   CALCIUM 10.2   < > 9.9   < > 10.0   < > 9.7 9.6 9.8   ALBUMIN 3.2*  --  3.1*  --  3.2*  --   --   --   --    PROT 8.0  --  7.8  --  8.1  --   --   --   --    *   < > 133*   < > 132*   < > 133* 132* 131*   K 3.5   < > 3.4*   < > 3.4*   < > 3.7 4.4 4.1   CO2 30*   < > 29   < > 28   < > 26 24 23   CL 91*   < > 94*   < > 94*   < > 97 99 99   BUN 42*   < > 40*   < > 37*   < > 29* 28* 25*   CREATININE 2.2*   < > 2.0*   < > 2.3*   < > 2.0* 2.1* 1.8*   ALKPHOS 97  --  96  --  102  --   --   --   --    ALT 67*  --  58*  --  57*  --   --   --   --    *  --  106*  --  101*  --   --   --   --    BILITOT 3.3*  --  3.3*  --  3.0*  --   --   --   --      < > = values in this interval not displayed.      Coagulation:   Recent Labs   Lab 07/02/22  0649 07/02/22  1048 07/03/22  0318 07/04/22  0330 07/05/22  0400 07/06/22  1333 07/06/22  1946 07/07/22  0315   INR 1.6*  --  1.4* 1.3*  --   --   --   --    APTT  --    < > 41.5* 39.1*   < > 40.4* 40.2* 39.2*    < > = values in this interval not displayed.     LDH:  Recent Labs   Lab 07/05/22  0642 07/06/22  0827 07/07/22  0315   LDH 1,661* 1,429* 1,345*     Microbiology:  Microbiology Results (last 7 days)       ** No results found for the last 168 hours. **            I have reviewed all pertinent labs within the past 24 hours.    Estimated Creatinine Clearance: 58.2 mL/min (A) (based on SCr of 1.8 mg/dL (H)).    Diagnostic Results:  I have reviewed and interpreted all pertinent imaging results/findings within the past 24 hours.

## 2022-07-07 NOTE — NURSING
Pt requested to maintain home schedule of weekly VAD dressing changes. Ok per Bridget Freeman, VAD Coordinator

## 2022-07-07 NOTE — ASSESSMENT & PLAN NOTE
Procedure: Device Interrogation Including analysis of device parameters  Current Settings: Ventricular Assist Device  Review of device function is stable    TXP LVAD INTERROGATIONS 7/7/2022 7/7/2022 7/7/2022 7/7/2022 7/7/2022 7/7/2022 7/7/2022   Type HeartMate II HeartMate II HeartMate II HeartMate II HeartMate II HeartMate II HeartMate II   Flow 6.9 7.4 8.2 7.9 8.4 9.2 6.5   Speed 9800 9800 9800 9800 9800 9800 9800   PI 2.9 2.8 3.0 2.9 3 3 2.8   Power (Jackson) 7.3 7.6 8.1 7.7 8.2 8.6 7.1   LSL 9400 9400 9400 9400 9400 9400 9400   Pulsatility Intermittent pulse Intermittent pulse Intermittent pulse Intermittent pulse Intermittent pulse Intermittent pulse Intermittent pulse

## 2022-07-07 NOTE — PROGRESS NOTES
07/07/2022  Casper Harris    Current provider:  Isaiah Santizo MD    Device interrogation:  TXP LVAD INTERROGATIONS 7/7/2022 7/7/2022 7/7/2022 7/7/2022 7/7/2022 7/7/2022 7/7/2022   Type HeartMate II HeartMate II HeartMate II HeartMate II HeartMate II HeartMate II HeartMate II   Flow 7.1 7.7 7.2 7.6 6.9 7.4 8.2   Speed 9800 9800 9800 9800 9800 9800 9800   PI 2.6 2.7 3.0 3.0 2.9 2.8 3.0   Power (Jackson) 7.5 7.5 7.5 7.7 7.3 7.6 8.1   LSL 9400 9400 9400 9400 9400 9400 9400   Pulsatility Intermittent pulse Intermittent pulse Intermittent pulse Intermittent pulse Intermittent pulse Intermittent pulse Intermittent pulse          Rounded on Tim Richards to ensure all mechanical assist device settings (IABP or VAD) were appropriate and all parameters were within limits.  I was able to ensure all back up equipment was present, the staff had no issues, and the Perfusion Department daily rounding was complete.      For implantable VADs: Interrogation of Ventricular assist device was performed with analysis of device parameters and review of device function. I have personally reviewed the interrogation findings and agree with findings as stated.     In emergency, the nursing units have been notified to contact the perfusion department either by:  Calling [phone] from 630am to 4pm Mon thru Fri, utilizing the On-Call Finder functionality of Epic and searching for Perfusion, or by contacting the hospital  from 4pm to 630am and on weekends and asking to speak with the perfusionist on call.    3:16 PM

## 2022-07-07 NOTE — ASSESSMENT & PLAN NOTE
#ADHF  #NICMP  -Admitted with acute decompensated HF presence of HM2 LVAD  -Was placed on lasix gtt but appeared euvolemic and lasix gtt was stopped  -Continue Amlodipine 10mg daily  -Holding Warfarin, Heparin started instead  -Speed echo at 9800 showed bowing to [place] [id number], IVS was at midline, MR decreased but worse AR

## 2022-07-07 NOTE — NURSING
VAD flows 8.4-9L. Power reached 9 for a few seconds. Power sustaining around 8.5-8.8. Called VAD coordinator.

## 2022-07-07 NOTE — PLAN OF CARE
No acute events overnight. Pt afebrile. NSR on monitor with HR 70-80bpm. MAPs 70-80 with doppler. Intermittent pulse. HM2 continues at set speed of 9800L/min. Flows 6.4-8.1L. No VAD alarms. Utilizing room air. Urine is tea colored. Pt did have some heart burn overnight resolved with one time dose of pepcid per order. CVPs 13, 11, 14    Problem: Adult Inpatient Plan of Care  Goal: Plan of Care Review  7/7/2022 0457 by Fifi Copeland RN  Outcome: Ongoing, Progressing  Goal: Absence of Hospital-Acquired Illness or Injury  Outcome: Ongoing, Progressing  Goal: Optimal Comfort and Wellbeing  Outcome: Ongoing, Progressing     Problem: Infection  Goal: Absence of Infection Signs and Symptoms  Outcome: Ongoing, Progressing     Problem: Bleeding (Ventricular Assist Device)  Goal: Absence of Bleeding  Outcome: Ongoing, Progressing     Problem: Embolism (Ventricular Assist Device)  Goal: Absence of Embolism Signs and Symptoms  Outcome: Ongoing, Progressing     Problem: Hemodynamic Instability (Ventricular Assist Device)  Goal: Optimal Blood Flow  Outcome: Ongoing, Progressing     Problem: Infection (Ventricular Assist Device)  Goal: Absence of Infection Signs and Symptoms  Outcome: Ongoing, Progressing

## 2022-07-08 DIAGNOSIS — I50.42 CHRONIC COMBINED SYSTOLIC AND DIASTOLIC HEART FAILURE: ICD-10-CM

## 2022-07-08 DIAGNOSIS — I42.0 DILATED CARDIOMYOPATHY: ICD-10-CM

## 2022-07-08 DIAGNOSIS — T82.9XXD COMPLICATION INVOLVING LEFT VENTRICULAR ASSIST DEVICE (LVAD), SUBSEQUENT ENCOUNTER: Primary | ICD-10-CM

## 2022-07-08 LAB
ALLENS TEST: ABNORMAL
ANION GAP SERPL CALC-SCNC: 10 MMOL/L (ref 8–16)
ANION GAP SERPL CALC-SCNC: 9 MMOL/L (ref 8–16)
APTT BLDCRRT: 44.8 SEC (ref 21–32)
BASOPHILS # BLD AUTO: 0.01 K/UL (ref 0–0.2)
BASOPHILS NFR BLD: 0.1 % (ref 0–1.9)
BSA FOR ECHO PROCEDURE: 2.29 M2
BUN SERPL-MCNC: 22 MG/DL (ref 6–20)
BUN SERPL-MCNC: 23 MG/DL (ref 6–20)
CALCIUM SERPL-MCNC: 9.9 MG/DL (ref 8.7–10.5)
CALCIUM SERPL-MCNC: 9.9 MG/DL (ref 8.7–10.5)
CHLORIDE SERPL-SCNC: 100 MMOL/L (ref 95–110)
CHLORIDE SERPL-SCNC: 99 MMOL/L (ref 95–110)
CO2 SERPL-SCNC: 23 MMOL/L (ref 23–29)
CO2 SERPL-SCNC: 25 MMOL/L (ref 23–29)
CREAT SERPL-MCNC: 1.7 MG/DL (ref 0.5–1.4)
CREAT SERPL-MCNC: 1.8 MG/DL (ref 0.5–1.4)
DELSYS: ABNORMAL
DIFFERENTIAL METHOD: ABNORMAL
DOP CALC RVOT PEAK VEL: 0.65 M/S
DOP CALC RVOT VTI: 7.78 CM
EJECTION FRACTION: 13 %
EOSINOPHIL # BLD AUTO: 0.1 K/UL (ref 0–0.5)
EOSINOPHIL NFR BLD: 1.4 % (ref 0–8)
ERYTHROCYTE [DISTWIDTH] IN BLOOD BY AUTOMATED COUNT: 15 % (ref 11.5–14.5)
EST. GFR  (AFRICAN AMERICAN): 47.9 ML/MIN/1.73 M^2
EST. GFR  (AFRICAN AMERICAN): 51.3 ML/MIN/1.73 M^2
EST. GFR  (NON AFRICAN AMERICAN): 41.4 ML/MIN/1.73 M^2
EST. GFR  (NON AFRICAN AMERICAN): 44.4 ML/MIN/1.73 M^2
FIO2: 21
GLUCOSE SERPL-MCNC: 114 MG/DL (ref 70–110)
GLUCOSE SERPL-MCNC: 86 MG/DL (ref 70–110)
HCO3 UR-SCNC: 25.6 MMOL/L (ref 24–28)
HCT VFR BLD AUTO: 30.5 % (ref 40–54)
HGB BLD-MCNC: 9.2 G/DL (ref 14–18)
IMM GRANULOCYTES # BLD AUTO: 0.04 K/UL (ref 0–0.04)
IMM GRANULOCYTES NFR BLD AUTO: 0.6 % (ref 0–0.5)
LACTATE SERPL-SCNC: 1.2 MMOL/L (ref 0.5–2.2)
LDH SERPL L TO P-CCNC: 1384 U/L (ref 110–260)
LEFT VENTRICLE DIASTOLIC VOLUME INDEX: 249.31 ML/M2
LEFT VENTRICLE DIASTOLIC VOLUME: 563.43 ML
LEFT VENTRICULAR INTERNAL DIMENSION IN DIASTOLE: 9.99 CM (ref 3.5–6)
LYMPHOCYTES # BLD AUTO: 0.9 K/UL (ref 1–4.8)
LYMPHOCYTES NFR BLD: 13 % (ref 18–48)
MAGNESIUM SERPL-MCNC: 2 MG/DL (ref 1.6–2.6)
MAGNESIUM SERPL-MCNC: 2.1 MG/DL (ref 1.6–2.6)
MCH RBC QN AUTO: 26.4 PG (ref 27–31)
MCHC RBC AUTO-ENTMCNC: 30.2 G/DL (ref 32–36)
MCV RBC AUTO: 88 FL (ref 82–98)
MODE: ABNORMAL
MONOCYTES # BLD AUTO: 1.2 K/UL (ref 0.3–1)
MONOCYTES NFR BLD: 16 % (ref 4–15)
NEUTROPHILS # BLD AUTO: 5 K/UL (ref 1.8–7.7)
NEUTROPHILS NFR BLD: 68.9 % (ref 38–73)
NRBC BLD-RTO: 0 /100 WBC
OHS CV RAMP LVEDD 1: 9.9
OHS CV RAMP LVEDD 2: 10.4
OHS CV RAMP SPEED 1: 9800
OHS CV RAMP SPEED 2: ABNORMAL
PCO2 BLDA: 44 MMHG (ref 35–45)
PH SMN: 7.37 [PH] (ref 7.35–7.45)
PISA TR MAX VEL: 2.7 M/S
PLATELET # BLD AUTO: 216 K/UL (ref 150–450)
PMV BLD AUTO: 11.3 FL (ref 9.2–12.9)
PO2 BLDA: 27 MMHG (ref 40–60)
POC BE: 0 MMOL/L
POC SATURATED O2: 48 % (ref 95–100)
POC TCO2: 27 MMOL/L (ref 24–29)
POTASSIUM SERPL-SCNC: 3.6 MMOL/L (ref 3.5–5.1)
POTASSIUM SERPL-SCNC: 4 MMOL/L (ref 3.5–5.1)
PV MEAN GRADIENT: 0.86 MMHG
PV PEAK GRADIENT: 1.67 MMHG
RBC # BLD AUTO: 3.48 M/UL (ref 4.6–6.2)
SAMPLE: ABNORMAL
SITE: ABNORMAL
SODIUM SERPL-SCNC: 132 MMOL/L (ref 136–145)
SODIUM SERPL-SCNC: 134 MMOL/L (ref 136–145)
TR MAX PG: 29 MMHG
WBC # BLD AUTO: 7.25 K/UL (ref 3.9–12.7)

## 2022-07-08 PROCEDURE — 27000248 HC VAD-ADDITIONAL DAY

## 2022-07-08 PROCEDURE — 83615 LACTATE (LD) (LDH) ENZYME: CPT | Performed by: STUDENT IN AN ORGANIZED HEALTH CARE EDUCATION/TRAINING PROGRAM

## 2022-07-08 PROCEDURE — 25000003 PHARM REV CODE 250: Performed by: STUDENT IN AN ORGANIZED HEALTH CARE EDUCATION/TRAINING PROGRAM

## 2022-07-08 PROCEDURE — 25000003 PHARM REV CODE 250: Performed by: INTERNAL MEDICINE

## 2022-07-08 PROCEDURE — 63600175 PHARM REV CODE 636 W HCPCS: Performed by: INTERNAL MEDICINE

## 2022-07-08 PROCEDURE — 93750 INTERROGATION VAD IN PERSON: CPT | Mod: ,,, | Performed by: INTERNAL MEDICINE

## 2022-07-08 PROCEDURE — 94761 N-INVAS EAR/PLS OXIMETRY MLT: CPT

## 2022-07-08 PROCEDURE — 20000000 HC ICU ROOM

## 2022-07-08 PROCEDURE — 99233 PR SUBSEQUENT HOSPITAL CARE,LEVL III: ICD-10-PCS | Mod: ,,, | Performed by: INTERNAL MEDICINE

## 2022-07-08 PROCEDURE — 80048 BASIC METABOLIC PNL TOTAL CA: CPT | Performed by: INTERNAL MEDICINE

## 2022-07-08 PROCEDURE — 63600175 PHARM REV CODE 636 W HCPCS: Performed by: STUDENT IN AN ORGANIZED HEALTH CARE EDUCATION/TRAINING PROGRAM

## 2022-07-08 PROCEDURE — 83735 ASSAY OF MAGNESIUM: CPT | Performed by: INTERNAL MEDICINE

## 2022-07-08 PROCEDURE — 85025 COMPLETE CBC W/AUTO DIFF WBC: CPT | Performed by: STUDENT IN AN ORGANIZED HEALTH CARE EDUCATION/TRAINING PROGRAM

## 2022-07-08 PROCEDURE — 99233 SBSQ HOSP IP/OBS HIGH 50: CPT | Mod: ,,, | Performed by: INTERNAL MEDICINE

## 2022-07-08 PROCEDURE — 83605 ASSAY OF LACTIC ACID: CPT | Performed by: INTERNAL MEDICINE

## 2022-07-08 PROCEDURE — 93750 PR INTERROGATE VENT ASSIST DEV, IN PERSON, W PHYSICIAN ANALYSIS: ICD-10-PCS | Mod: ,,, | Performed by: INTERNAL MEDICINE

## 2022-07-08 RX ORDER — FUROSEMIDE 10 MG/ML
80 INJECTION INTRAMUSCULAR; INTRAVENOUS ONCE
Status: COMPLETED | OUTPATIENT
Start: 2022-07-08 | End: 2022-07-08

## 2022-07-08 RX ADMIN — HEPARIN SODIUM 20 UNITS/KG/HR: 5000 INJECTION INTRAVENOUS; SUBCUTANEOUS at 09:07

## 2022-07-08 RX ADMIN — BUSPIRONE HYDROCHLORIDE 5 MG: 5 TABLET ORAL at 08:07

## 2022-07-08 RX ADMIN — FUROSEMIDE 80 MG: 10 INJECTION, SOLUTION INTRAMUSCULAR; INTRAVENOUS at 02:07

## 2022-07-08 RX ADMIN — AMIODARONE HYDROCHLORIDE 400 MG: 200 TABLET ORAL at 08:07

## 2022-07-08 RX ADMIN — MEXILETINE HYDROCHLORIDE 250 MG: 250 CAPSULE ORAL at 05:07

## 2022-07-08 RX ADMIN — AMLODIPINE BESYLATE 10 MG: 10 TABLET ORAL at 08:07

## 2022-07-08 RX ADMIN — ASPIRIN 325 MG: 325 TABLET, COATED ORAL at 08:07

## 2022-07-08 RX ADMIN — ALPRAZOLAM 1 MG: 0.5 TABLET ORAL at 06:07

## 2022-07-08 RX ADMIN — EPTIFIBATIDE 2 MCG/KG/MIN: 0.75 INJECTION INTRAVENOUS at 11:07

## 2022-07-08 RX ADMIN — EPTIFIBATIDE 2 MCG/KG/MIN: 0.75 INJECTION INTRAVENOUS at 05:07

## 2022-07-08 RX ADMIN — MEXILETINE HYDROCHLORIDE 250 MG: 250 CAPSULE ORAL at 09:07

## 2022-07-08 RX ADMIN — LIDOCAINE 1 PATCH: 50 PATCH CUTANEOUS at 11:07

## 2022-07-08 RX ADMIN — MEXILETINE HYDROCHLORIDE 250 MG: 250 CAPSULE ORAL at 02:07

## 2022-07-08 RX ADMIN — BUSPIRONE HYDROCHLORIDE 5 MG: 5 TABLET ORAL at 09:07

## 2022-07-08 RX ADMIN — ACETAMINOPHEN 650 MG: 325 TABLET ORAL at 08:07

## 2022-07-08 RX ADMIN — ALLOPURINOL 100 MG: 100 TABLET ORAL at 08:07

## 2022-07-08 RX ADMIN — EPTIFIBATIDE 1 MCG/KG/MIN: 0.75 INJECTION INTRAVENOUS at 03:07

## 2022-07-08 RX ADMIN — PANTOPRAZOLE SODIUM 40 MG: 40 TABLET, DELAYED RELEASE ORAL at 11:07

## 2022-07-08 RX ADMIN — EPTIFIBATIDE 2 MCG/KG/MIN: 0.75 INJECTION INTRAVENOUS at 09:07

## 2022-07-08 RX ADMIN — LEVOTHYROXINE SODIUM 112 MCG: 112 TABLET ORAL at 05:07

## 2022-07-08 RX ADMIN — BUSPIRONE HYDROCHLORIDE 5 MG: 5 TABLET ORAL at 02:07

## 2022-07-08 NOTE — PROGRESS NOTES
After discussing equipment with patient, patient needed new patient cable.   Lot#[id number] given to patient today in clinic and patient educated on proper use of item and maintenance. This patient cable is medically necessary for proper care and maintenance of LVAD system.MCS connect updated and VAD snapshot updated today.It is medically necessary to ensure patient has properly functioning equipment and wearables to prevent infection, injury or death to patient.

## 2022-07-08 NOTE — PLAN OF CARE
Cardiac ICU Care Plan    POC reviewed with Tim Yonis Richards and family. Questions and concerns addressed. Pt awaiting possible pump exchange. Pt progressing toward goals. Will continue to monitor. See below and flowsheets for full assessment and VS info.       Neuro:  [place] Coma Scale  Best Eye Response: 4-->(E4) spontaneous  Best Motor Response: 6-->(M6) obeys commands  Best Verbal Response: 5-->(V5) oriented  [place] Coma Scale Score: 15  Assessment Qualifiers: patient not sedated/intubated  Pupil PERRLA: yes    24 hr Temp:  [97.8 °F (36.6 °C)-98.9 °F (37.2 °C)]      CV:  Rhythm: normal sinus rhythm   DVT prophylaxis: VTE Required Core Measure: Pharmacological prophylaxis initiated/maintained    CVP (mean): (S) 12 mmHg (07/08/22 0405)       SVO2 (%): 63 % (07/05/22 0400)       Type: HeartMate II       Pulses  Right Radial Pulse: Doppler  Left Radial Pulse: Doppler  Right Dorsalis Pedis Pulse: Doppler  Left Dorsalis Pedis Pulse: Doppler  Right Posterior Tibial Pulse: Doppler  Left Posterior Tibial Pulse: Doppler    Resp:  O2 Device (Oxygen Therapy): room air  Flow (L/min): 2  Oxygen Concentration (%): 95    GI/:  GI prophylaxis: yes  Diet/Nutrition Received: 2 gram sodium, low saturated fat/low cholesterol  Last Bowel Movement: 07/05/22  Voiding Characteristics: voids spontaneously without difficulty   Intake/Output Summary (Last 24 hours) at 7/8/2022 0454  Last data filed at 7/8/2022 0405  Gross per 24 hour   Intake 1339.78 ml   Output 1350 ml   Net -10.22 ml          Labs/Accuchecks:  Recent Labs   Lab 07/06/22  0442 07/07/22  0315 07/08/22  0348   WBC 6.19 7.59 7.25   RBC 3.68* 3.68* 3.48*   HGB 9.7* 9.8* 9.2*   HCT 30.9* 32.1* 30.5*    213 216      Recent Labs   Lab 07/02/22  0649 07/02/22  1048 07/03/22  0318 07/04/22  0330 07/05/22  0400 07/07/22  1137 07/07/22  1814 07/07/22  2346   INR 1.6*  --  1.4* 1.3*  --   --   --   --    APTT  --    < > 41.5* 39.1*   < > 38.5* 41.6* 44.8*    < > = values  in this interval not displayed.      Recent Labs     07/08/22  0348   *   K 4.0   CO2 23      BUN 22*   CREATININE 1.7*     No results for input(s): CPK, CPKMB, MB, TROPONINI in the last 168 hours.   Recent Labs     07/06/22  0440 07/07/22  0509 07/08/22  0429   PH 7.396 7.392 7.374   PCO2 49.4* 47.9* 44.0   PO2 33* 28* 27*   HCO3 30.3* 29.1* 25.6   POCSATURATED 62* 52* 48*   BE 5 4 0       Electrolytes: N/A - electrolytes WDL  Accuchecks: none    Gtts/LDAs:   eptifibatide 1 mcg/kg/min (07/08/22 0405)    heparin (porcine) in D5W Stopped (07/07/22 2342)       Lines/Drains/Airways       Central Venous Catheter Line  Duration             Percutaneous Central Line Insertion/Assessment - Triple Lumen  07/02/22 1522 right internal jugular 5 days              Arterial Line  Duration             Arterial Line 07/02/22 1545 Left Radial 5 days              Line  Duration                  VAD 03/08/18 1124 Left ventricular assist device HeartMate II 1582 days              Peripheral Intravenous Line  Duration                  Midline Catheter Insertion/Assessment  - Single Lumen 06/28/22 1027 Right cephalic vein (lateral side of arm) 20g x 8cm 9 days                    Skin/Wounds  Bathing/Skin Care: linen changed;dressed/undressed (07/07/22 1505)  Wounds: No

## 2022-07-08 NOTE — NURSING
Pt sitting on EOB with wife at bedside. Pt in a good mood, aware that he will be getting a LVAD exchange. I informed patient that he is scheduled for 7/13 with Dr. Kaufman. Pt states he is nervous but he is ready. I sat with wife and patient for 30 minutes reviewing the surgery, education and emotional support provided. I did consult palliative care to speak with the patient in preparation for surgery. Pt agrees.     VAD parameters- Flow 8s, power 8s. No alarms on interrogation.

## 2022-07-08 NOTE — ASSESSMENT & PLAN NOTE
#ADHF  #NICMP  -Admitted with acute decompensated HF presence of HM2 LVAD  -Continue Amlodipine 10mg daily  -Holding Warfarin, Heparin started instead  -Speed echo at 9800 showed [place]ing to the [place] [id number], IVS was at midline, MR decreased but worse AR  -Was placed on lasix gtt but appeared euvolemic and lasix gtt was stopped, administering Lasix 80 mg IV x 1  - Obtain Ramp TTE today

## 2022-07-08 NOTE — SUBJECTIVE & OBJECTIVE
Interval History:   No acute events overnight  The patient notes worsening shortness of breath  CO: 6.17  CI: 2.75  CVP: 12  SVR: 843    Continuous Infusions:   eptifibatide 2 mcg/kg/min (07/08/22 1600)    heparin (porcine) in D5W 20 Units/kg/hr (07/08/22 1600)     Scheduled Meds:   allopurinoL  100 mg Oral Daily    amiodarone  400 mg Oral Daily    amLODIPine  10 mg Oral Daily    aspirin  325 mg Oral Daily    busPIRone  5 mg Oral TID    levothyroxine  112 mcg Oral Before breakfast    LIDOcaine  1 patch Transdermal Q24H    mexiletine  250 mg Oral Q8H    pantoprazole  40 mg Oral Daily with lunch     PRN Meds:acetaminophen, ALPRAZolam, famotidine, sodium chloride 0.9%, zolpidem    Review of patient's allergies indicates:   Allergen Reactions    Lisinopril Anaphylaxis    Hydralazine analogues      Chronic constipation, impotence, dizziness     Objective:     Vital Signs (Most Recent):  Temp: 98.8 °F (37.1 °C) (07/08/22 1501)  Pulse: 85 (07/08/22 1600)  Resp: (!) 26 (07/08/22 1600)  BP: 94/75 (07/08/22 1300)  SpO2: 95 % (07/08/22 1528)   Vital Signs (24h Range):  Temp:  [98.6 °F (37 °C)-98.9 °F (37.2 °C)] 98.8 °F (37.1 °C)  Pulse:  [] 85  Resp:  [16-49] 26  SpO2:  [95 %] 95 %  BP: ()/(0-78) 94/75  Arterial Line BP: ()/(57-97) 81/74     Patient Vitals for the past 72 hrs (Last 3 readings):   Weight   07/08/22 1000 102.1 kg (225 lb)   07/07/22 0400 102.2 kg (225 lb 5 oz)     Body mass index is 29.69 kg/m².      Intake/Output Summary (Last 24 hours) at 7/8/2022 1655  Last data filed at 7/8/2022 1600  Gross per 24 hour   Intake 1646.06 ml   Output 1250 ml   Net 396.06 ml       Hemodynamic Parameters:       Telemetry:   NSVT up to 6 beats    Physical Exam  Constitutional:       General: He is not in acute distress.     Appearance: He is obese.   HENT:      Head: Normocephalic.      Mouth/Throat:      Mouth: Mucous membranes are moist.   Eyes:      Extraocular Movements: Extraocular movements intact.    Cardiovascular:      Rate and Rhythm: Normal rate and regular rhythm.      Comments: VAD hum appreciated  Pulses detected via doppler  Pulmonary:      Effort: No respiratory distress.      Breath sounds: Normal breath sounds.      Comments: Coarse breath sounds at the bases bilaterally  Abdominal:      General: Bowel sounds are normal. There is no distension.      Palpations: Abdomen is soft.      Tenderness: There is no abdominal tenderness.   Musculoskeletal:      Cervical back: Neck supple.   Skin:     General: Skin is warm.   Neurological:      General: No focal deficit present.      Mental Status: He is oriented to person, place, and time.   Psychiatric:         Mood and Affect: Mood normal.         Behavior: Behavior normal.       Significant Labs:  CBC:  Recent Labs   Lab 07/06/22  0442 07/07/22  0315 07/08/22  0348   WBC 6.19 7.59 7.25   RBC 3.68* 3.68* 3.48*   HGB 9.7* 9.8* 9.2*   HCT 30.9* 32.1* 30.5*    213 216   MCV 84 87 88   MCH 26.4* 26.6* 26.4*   MCHC 31.4* 30.5* 30.2*     BNP:  No results for input(s): BNP in the last 168 hours.    Invalid input(s): BNPTRIAGELBLO  CMP:  Recent Labs   Lab 07/02/22  0648 07/02/22  1303 07/03/22  0318 07/03/22  0904 07/04/22  0330 07/04/22  0825 07/07/22  0315 07/07/22  1814 07/08/22  0348   GLU 95   < > 98   < > 100   < > 96 133* 86   CALCIUM 10.2   < > 9.9   < > 10.0   < > 9.8 9.3 9.9   ALBUMIN 3.2*  --  3.1*  --  3.2*  --   --   --   --    PROT 8.0  --  7.8  --  8.1  --   --   --   --    *   < > 133*   < > 132*   < > 131* 132* 132*   K 3.5   < > 3.4*   < > 3.4*   < > 4.1 4.2 4.0   CO2 30*   < > 29   < > 28   < > 23 23 23   CL 91*   < > 94*   < > 94*   < > 99 100 100   BUN 42*   < > 40*   < > 37*   < > 25* 24* 22*   CREATININE 2.2*   < > 2.0*   < > 2.3*   < > 1.8* 1.7* 1.7*   ALKPHOS 97  --  96  --  102  --   --   --   --    ALT 67*  --  58*  --  57*  --   --   --   --    *  --  106*  --  101*  --   --   --   --    BILITOT 3.3*  --  3.3*  --   3.0*  --   --   --   --     < > = values in this interval not displayed.      Coagulation:   Recent Labs   Lab 07/02/22  0649 07/02/22  1048 07/03/22  0318 07/04/22  0330 07/05/22  0400 07/07/22  1137 07/07/22  1814 07/07/22  2346   INR 1.6*  --  1.4* 1.3*  --   --   --   --    APTT  --    < > 41.5* 39.1*   < > 38.5* 41.6* 44.8*    < > = values in this interval not displayed.     LDH:  Recent Labs   Lab 07/06/22  0827 07/07/22  0315 07/07/22  1814 07/08/22  0348   LDH 1,429* 1,345* 1,355* 1,384*     Microbiology:  Microbiology Results (last 7 days)       ** No results found for the last 168 hours. **            I have reviewed all pertinent labs within the past 24 hours.    Estimated Creatinine Clearance: 61.7 mL/min (A) (based on SCr of 1.7 mg/dL (H)).    Diagnostic Results:  I have reviewed and interpreted all pertinent imaging results/findings within the past 24 hours.

## 2022-07-08 NOTE — PROGRESS NOTES
[facility] - Cardiac Intensive Care  Heart Transplant  Progress Note    Patient Name: Tim Richards  MRN: [id number]  Admission Date: 6/27/2022  Hospital Length of Stay: 11 days  Attending Physician: Isaiah Santizo MD  Primary Care Provider: Deyanira Booth MD  Principal Problem:Left ventricular assist device (LVAD) complication    Subjective:     Interval History:   No acute events overnight  The patient notes worsening shortness of breath  CO: 6.17  CI: 2.75  CVP: 12  SVR: 843    Continuous Infusions:   eptifibatide 2 mcg/kg/min (07/08/22 1600)    heparin (porcine) in D5W 20 Units/kg/hr (07/08/22 1600)     Scheduled Meds:   allopurinoL  100 mg Oral Daily    amiodarone  400 mg Oral Daily    amLODIPine  10 mg Oral Daily    aspirin  325 mg Oral Daily    busPIRone  5 mg Oral TID    levothyroxine  112 mcg Oral Before breakfast    LIDOcaine  1 patch Transdermal Q24H    mexiletine  250 mg Oral Q8H    pantoprazole  40 mg Oral Daily with lunch     PRN Meds:acetaminophen, ALPRAZolam, famotidine, sodium chloride 0.9%, zolpidem    Review of patient's allergies indicates:   Allergen Reactions    Lisinopril Anaphylaxis    Hydralazine analogues      Chronic constipation, impotence, dizziness     Objective:     Vital Signs (Most Recent):  Temp: 98.8 °F (37.1 °C) (07/08/22 1501)  Pulse: 85 (07/08/22 1600)  Resp: (!) 26 (07/08/22 1600)  BP: 94/75 (07/08/22 1300)  SpO2: 95 % (07/08/22 1528)   Vital Signs (24h Range):  Temp:  [98.6 °F (37 °C)-98.9 °F (37.2 °C)] 98.8 °F (37.1 °C)  Pulse:  [] 85  Resp:  [16-49] 26  SpO2:  [95 %] 95 %  BP: ()/(0-78) 94/75  Arterial Line BP: ()/(57-97) 81/74     Patient Vitals for the past 72 hrs (Last 3 readings):   Weight   07/08/22 1000 102.1 kg (225 lb)   07/07/22 0400 102.2 kg (225 lb 5 oz)     Body mass index is 29.69 kg/m².      Intake/Output Summary (Last 24 hours) at 7/8/2022 1655  Last data filed at 7/8/2022 1600  Gross per 24 hour   Intake 1646.06 ml    Output 1250 ml   Net 396.06 ml       Hemodynamic Parameters:       Telemetry:   NSVT up to 6 beats    Physical Exam  Constitutional:       General: He is not in acute distress.     Appearance: He is obese.   HENT:      Head: Normocephalic.      Mouth/Throat:      Mouth: Mucous membranes are moist.   Eyes:      Extraocular Movements: Extraocular movements intact.   Cardiovascular:      Rate and Rhythm: Normal rate and regular rhythm.      Comments: VAD hum appreciated  Pulses detected via doppler  Pulmonary:      Effort: No respiratory distress.      Breath sounds: Normal breath sounds.      Comments: Coarse breath sounds at the bases bilaterally  Abdominal:      General: Bowel sounds are normal. There is no distension.      Palpations: Abdomen is soft.      Tenderness: There is no abdominal tenderness.   Musculoskeletal:      Cervical back: Neck supple.   Skin:     General: Skin is warm.   Neurological:      General: No focal deficit present.      Mental Status: He is oriented to person, place, and time.   Psychiatric:         Mood and Affect: Mood normal.         Behavior: Behavior normal.       Significant Labs:  CBC:  Recent Labs   Lab 07/06/22  0442 07/07/22  0315 07/08/22  0348   WBC 6.19 7.59 7.25   RBC 3.68* 3.68* 3.48*   HGB 9.7* 9.8* 9.2*   HCT 30.9* 32.1* 30.5*    213 216   MCV 84 87 88   MCH 26.4* 26.6* 26.4*   MCHC 31.4* 30.5* 30.2*     BNP:  No results for input(s): BNP in the last 168 hours.    Invalid input(s): BNPTRIAGELBLO  CMP:  Recent Labs   Lab 07/02/22  0648 07/02/22  1303 07/03/22  0318 07/03/22  0904 07/04/22  0330 07/04/22  0825 07/07/22  0315 07/07/22  1814 07/08/22  0348   GLU 95   < > 98   < > 100   < > 96 133* 86   CALCIUM 10.2   < > 9.9   < > 10.0   < > 9.8 9.3 9.9   ALBUMIN 3.2*  --  3.1*  --  3.2*  --   --   --   --    PROT 8.0  --  7.8  --  8.1  --   --   --   --    *   < > 133*   < > 132*   < > 131* 132* 132*   K 3.5   < > 3.4*   < > 3.4*   < > 4.1 4.2 4.0   CO2 30*    < > 29   < > 28   < > 23 23 23   CL 91*   < > 94*   < > 94*   < > 99 100 100   BUN 42*   < > 40*   < > 37*   < > 25* 24* 22*   CREATININE 2.2*   < > 2.0*   < > 2.3*   < > 1.8* 1.7* 1.7*   ALKPHOS 97  --  96  --  102  --   --   --   --    ALT 67*  --  58*  --  57*  --   --   --   --    *  --  106*  --  101*  --   --   --   --    BILITOT 3.3*  --  3.3*  --  3.0*  --   --   --   --     < > = values in this interval not displayed.      Coagulation:   Recent Labs   Lab 07/02/22  0649 07/02/22  1048 07/03/22  0318 07/04/22  0330 07/05/22  0400 07/07/22  1137 07/07/22  1814 07/07/22  2346   INR 1.6*  --  1.4* 1.3*  --   --   --   --    APTT  --    < > 41.5* 39.1*   < > 38.5* 41.6* 44.8*    < > = values in this interval not displayed.     LDH:  Recent Labs   Lab 07/06/22  0827 07/07/22  0315 07/07/22  1814 07/08/22  0348   LDH 1,429* 1,345* 1,355* 1,384*     Microbiology:  Microbiology Results (last 7 days)       ** No results found for the last 168 hours. **            I have reviewed all pertinent labs within the past 24 hours.    Estimated Creatinine Clearance: 61.7 mL/min (A) (based on SCr of 1.7 mg/dL (H)).    Diagnostic Results:  I have reviewed and interpreted all pertinent imaging results/findings within the past 24 hours.    Assessment and Plan:     55 Y/O M with Hx of stage D HFrEF (EF 10%) due to NICM underwent HM2 implant 3/8/18 and exchange of outflow graft 3/9/18, Hx VT/VF s/p SICD 2014 presenting with gradual worsening shortness of breath associated with nonpleuritic, nonradiating bilateral 4/10 retrosternal chest pressure pain.  Symptoms began yesterday and have gradually worsened in the last 12 hours.  He does report going to a family picnic with increased consumption of chicken wings, ribs and other salty meat products.  Prior to symptom onset, he reports nausea, nonbloody diarrhea began yesterday and single episode of nonbloody nonbilious vomiting this morning. He also complains of dizziness,  lightheadedness, and a mild HA. SOB worsened this morning prompting him to come to the ED.     In the ED, patient was in respiratory distress requiring BiPAP. MAP 96 and started on Nitro gtt. Work-up revealed WBC 10, K 5.4, Cr 2.5 (baseline 1.9), BNP 1950 (baseline 200-300s), Bili 2.1, LDH 1058, and INR 3.2. Bedside TTE with severely reduced EF, AV not opening, septum bulging into RV. Given IV Lasix 80 mg x1 with only 100-200 mL UOP and dark in color. HM2 interrogated and flows have been 8.5-9 L/min with no alarms or power surges. Cardiology consulted in the ED, dicussed with HTS, and decision made to admit to ICU for further mgmt.       * Left ventricular assist device (LVAD) complication  -Usually an elevated LDH is a red flag in a patient with LVAD, especially HM2 given the risk of pump thrombosis. PI, power and flow are elevated when compared to prior clinic visits  -LDH is now downtrending, but there is continued concern for red cell shedding  -Haptoglobin low (although it has been chronically low)  -Direct and indirect wendy test are negative  -Plasma free hemoglobin elevated  - concerned that he could have some degree of thrombosis, since adding a second antiplatelet agent didn't resolved it, Integrilin with a Heparin drip were started until we have lab confirmation that this issue that resolved (normalization of LDH etc)  -Integrilin increased to 2 mcg/kg/min  -Heparin drip  -Aspirin 325mg daily  -Continue discussions with CTS regarding possible upgrade to HM3    Procedure: Device Interrogation Including analysis of device parameters  Current Settings: Ventricular Assist Device    TXP LVAD INTERROGATIONS 7/8/2022 7/8/2022 7/8/2022 7/8/2022 7/8/2022 7/8/2022 7/8/2022   Type HeartMate II HeartMate II HeartMate II HeartMate II HeartMate II HeartMate II HeartMate II   Flow 8.5 8.5 7.5 7.4 7.9 7.8 7.2   Speed 9800 9800 9800 9800 9800 9800 9800   PI 2.5 2.5 2.9 2.7 2.6 2.6 2.7   Power (Jackson) 8.1 8.2 7.7 7.6 7.9  7.8 7.7   LSL 9400 9400 9400 9400 9400 9400 9400   Pulsatility Intermittent pulse Intermittent pulse Intermittent pulse Intermittent pulse Intermittent pulse Intermittent pulse Intermittent pulse       History of ventricular tachycardia  Patient experienced an episode of VT on 6/30 and experienced an ICD shock thought to be related to hypokalemia (K of 3.1 on 6/30)  - Plan to aggressively replete K, keep K>4 and Mg>2  - Will continue mexiletine 25mg q8hrs and amiodarone 400mg daily    COVID-19  -Previously hypoxic, now off oxygen  -ID was consulted, recommended Remdesivir, course completed    Elevated serum lactate dehydrogenase (LDH)  Concerned for pump thrombosis  Cont to trend  Plan for possible pump exchange vs medical management as above    amiodarone induced hypothyrodism  -Continue levothyroxine 112 mcg     LVAD (left ventricular assist device) present  Procedure: Device Interrogation Including analysis of device parameters  Current Settings: Ventricular Assist Device  Review of device function is stable    TXP LVAD INTERROGATIONS 7/8/2022 7/8/2022 7/8/2022 7/8/2022 7/8/2022 7/8/2022 7/8/2022   Type HeartMate II HeartMate II HeartMate II HeartMate II HeartMate II HeartMate II HeartMate II   Flow 8.5 8.5 7.5 7.4 7.9 7.8 7.2   Speed 9800 9800 9800 9800 9800 9800 9800   PI 2.5 2.5 2.9 2.7 2.6 2.6 2.7   Power (Jackson) 8.1 8.2 7.7 7.6 7.9 7.8 7.7   LSL 9400 9400 9400 9400 9400 9400 9400   Pulsatility Intermittent pulse Intermittent pulse Intermittent pulse Intermittent pulse Intermittent pulse Intermittent pulse Intermittent pulse       CKD (chronic kidney disease), stage III  WAGNER on CKD  Creatinine is 1.7 today with baseline of 1.5-1.9.  - Avoiding nephrotoxic medications  - Continue to monitor  - Strict I/O's    Chronic combined systolic and diastolic heart failure  #ADHF  #NICMP  -Admitted with acute decompensated HF presence of HM2 LVAD  -Continue Amlodipine 10mg daily  -Holding Warfarin, Heparin started  instead  -Speed echo at 9800 showed bowing to the right [place] [id number], IVS was at midline, MR decreased but worse AR  -Was placed on lasix gtt but appeared euvolemic and lasix gtt was stopped, administering Lasix 80 mg IV x 1  - Obtain Ramp TTE today        Harry Canales MD  Heart Transplant  [facility] - Cardiac Intensive Care

## 2022-07-08 NOTE — ASSESSMENT & PLAN NOTE
WAGNER on CKD  Creatinine is 1.7 today with baseline of 1.5-1.9.  - Avoiding nephrotoxic medications  - Continue to monitor  - Strict I/O's

## 2022-07-08 NOTE — PROGRESS NOTES
07/08/2022  Casper Harris    Current provider:  Isaiah Santizo MD    Device interrogation:  TXP LVAD INTERROGATIONS 7/8/2022 7/8/2022 7/8/2022 7/8/2022 7/8/2022 7/7/2022 7/7/2022   Type HeartMate II HeartMate II HeartMate II HeartMate II HeartMate II HeartMate II HeartMate II   Flow 7.7 7.7 7.8 6.9 6.8 7 7.5   Speed 9800 9800 9800 9800 9800 9800 9800   PI 2.8 3.4 3.4 3 3 3.2 3.1   Power (Jackson) 7.8 7.7 7.8 7.3 7.2 7.4 7.7   LSL - - - 9400 9400 9400 9400   Pulsatility - - - - - - -          Rounded on Tim Richards to ensure all mechanical assist device settings (IABP or VAD) were appropriate and all parameters were within limits.  I was able to ensure all back up equipment was present, the staff had no issues, and the Perfusion Department daily rounding was complete.      For implantable VADs: Interrogation of Ventricular assist device was performed with analysis of device parameters and review of device function. I have personally reviewed the interrogation findings and agree with findings as stated.     In emergency, the nursing units have been notified to contact the perfusion department either by:  Calling [phone] from 630am to 4pm Mon thru Fri, utilizing the On-Call Finder functionality of Epic and searching for Perfusion, or by contacting the hospital  from 4pm to 630am and on weekends and asking to speak with the perfusionist on call.    6:50 AM

## 2022-07-08 NOTE — ASSESSMENT & PLAN NOTE
-Usually an elevated LDH is a red flag in a patient with LVAD, especially HM2 given the risk of pump thrombosis. PI, power and flow are elevated when compared to prior clinic visits  -LDH is now downtrending, but there is continued concern for red cell shedding  -Haptoglobin low (although it has been chronically low)  -Direct and indirect wendy test are negative  -Plasma free hemoglobin elevated  - concerned that he could have some degree of thrombosis, since adding a second antiplatelet agent didn't resolved it, Integrilin with a Heparin drip were started until we have lab confirmation that this issue that resolved (normalization of LDH etc)  -Integrilin increased to 2 mcg/kg/min  -Heparin drip  -Aspirin 325mg daily  -Continue discussions with CTS regarding possible upgrade to HM3    Procedure: Device Interrogation Including analysis of device parameters  Current Settings: Ventricular Assist Device    TXP LVAD INTERROGATIONS 7/8/2022 7/8/2022 7/8/2022 7/8/2022 7/8/2022 7/8/2022 7/8/2022   Type HeartMate II HeartMate II HeartMate II HeartMate II HeartMate II HeartMate II HeartMate II   Flow 8.5 8.5 7.5 7.4 7.9 7.8 7.2   Speed 9800 9800 9800 9800 9800 9800 9800   PI 2.5 2.5 2.9 2.7 2.6 2.6 2.7   Power (Jackson) 8.1 8.2 7.7 7.6 7.9 7.8 7.7   LSL 9400 9400 9400 9400 9400 9400 9400   Pulsatility Intermittent pulse Intermittent pulse Intermittent pulse Intermittent pulse Intermittent pulse Intermittent pulse Intermittent pulse

## 2022-07-08 NOTE — PLAN OF CARE
Cardiac ICU Care Plan    POC reviewed with Tim Richards and family. Questions and concerns addressed. Pt VSS. No alarms on LVAD HM2. No acute events today. Pt progressing toward goals. Will continue to monitor. See below and flowsheets for full assessment and VS info.       Neuro:  [place] Coma Scale  Best Eye Response: 4-->(E4) spontaneous  Best Motor Response: 6-->(M6) obeys commands  Best Verbal Response: 5-->(V5) oriented  [place] Coma Scale Score: 15  Assessment Qualifiers: patient not sedated/intubated  Pupil PERRLA: yes    24 hr Temp:  [98.6 °F (37 °C)-98.9 °F (37.2 °C)]      CV:  Rhythm: normal sinus rhythm   DVT prophylaxis: VTE Required Core Measure: Pharmacological prophylaxis initiated/maintained    CVP (mean): 7 mmHg (07/08/22 1501)       SVO2 (%): 63 % (07/05/22 0400)       Type: HeartMate II       Pulses  Right Radial Pulse: Doppler  Left Radial Pulse: Doppler  Right Dorsalis Pedis Pulse: Doppler  Left Dorsalis Pedis Pulse: Doppler  Right Posterior Tibial Pulse: Doppler  Left Posterior Tibial Pulse: Doppler    Resp:  O2 Device (Oxygen Therapy): room air  Flow (L/min): 2  Oxygen Concentration (%): 95    GI/:  GI prophylaxis: no  Diet/Nutrition Received: 2 gram sodium, low saturated fat/low cholesterol  Last Bowel Movement: 07/08/22  Voiding Characteristics: voids spontaneously without difficulty   Intake/Output Summary (Last 24 hours) at 7/8/2022 1658  Last data filed at 7/8/2022 1600  Gross per 24 hour   Intake 1646.06 ml   Output 1250 ml   Net 396.06 ml          Labs/Accuchecks:  Recent Labs   Lab 07/06/22  0442 07/07/22  0315 07/08/22  0348   WBC 6.19 7.59 7.25   RBC 3.68* 3.68* 3.48*   HGB 9.7* 9.8* 9.2*   HCT 30.9* 32.1* 30.5*    213 216      Recent Labs   Lab 07/02/22  0649 07/02/22  1048 07/03/22  0318 07/04/22  0330 07/05/22  0400 07/07/22  1137 07/07/22  1814 07/07/22  2346   INR 1.6*  --  1.4* 1.3*  --   --   --   --    APTT  --    < > 41.5* 39.1*   < > 38.5* 41.6* 44.8*    < >  = values in this interval not displayed.      Recent Labs     07/08/22  0348   *   K 4.0   CO2 23      BUN 22*   CREATININE 1.7*     No results for input(s): CPK, CPKMB, MB, TROPONINI in the last 168 hours.   Recent Labs     07/06/22  0440 07/07/22  0509 07/08/22  0429   PH 7.396 7.392 7.374   PCO2 49.4* 47.9* 44.0   PO2 33* 28* 27*   HCO3 30.3* 29.1* 25.6   POCSATURATED 62* 52* 48*   BE 5 4 0       Electrolytes: No replacement orders  Accuchecks: none    Gtts/LDAs:   eptifibatide 2 mcg/kg/min (07/08/22 1600)    heparin (porcine) in D5W 20 Units/kg/hr (07/08/22 1600)       Lines/Drains/Airways       Central Venous Catheter Line  Duration             Percutaneous Central Line Insertion/Assessment - Triple Lumen  07/02/22 1522 right internal jugular 6 days              Arterial Line  Duration             Arterial Line 07/02/22 1545 Left Radial 6 days              Line  Duration                  VAD 03/08/18 1124 Left ventricular assist device HeartMate II 1583 days              Peripheral Intravenous Line  Duration                  Midline Catheter Insertion/Assessment  - Single Lumen 06/28/22 1027 Right cephalic vein (lateral side of arm) 20g x 8cm 10 days                    Skin/Wounds  Bathing/Skin Care: linen changed;dressed/undressed (07/07/22 1505)  Wounds: No  Wound care consulted: No

## 2022-07-09 LAB
ALLENS TEST: ABNORMAL
ANION GAP SERPL CALC-SCNC: 10 MMOL/L (ref 8–16)
ANION GAP SERPL CALC-SCNC: 13 MMOL/L (ref 8–16)
APTT BLDCRRT: 37.1 SEC (ref 21–32)
APTT BLDCRRT: 39.7 SEC (ref 21–32)
APTT BLDCRRT: 41.6 SEC (ref 21–32)
BASOPHILS # BLD AUTO: 0.01 K/UL (ref 0–0.2)
BASOPHILS NFR BLD: 0.2 % (ref 0–1.9)
BUN SERPL-MCNC: 21 MG/DL (ref 6–20)
BUN SERPL-MCNC: 23 MG/DL (ref 6–20)
CALCIUM SERPL-MCNC: 9 MG/DL (ref 8.7–10.5)
CALCIUM SERPL-MCNC: 9.8 MG/DL (ref 8.7–10.5)
CHLORIDE SERPL-SCNC: 100 MMOL/L (ref 95–110)
CHLORIDE SERPL-SCNC: 100 MMOL/L (ref 95–110)
CO2 SERPL-SCNC: 21 MMOL/L (ref 23–29)
CO2 SERPL-SCNC: 25 MMOL/L (ref 23–29)
CREAT SERPL-MCNC: 1.6 MG/DL (ref 0.5–1.4)
CREAT SERPL-MCNC: 1.9 MG/DL (ref 0.5–1.4)
DELSYS: ABNORMAL
DIFFERENTIAL METHOD: ABNORMAL
EOSINOPHIL # BLD AUTO: 0.1 K/UL (ref 0–0.5)
EOSINOPHIL NFR BLD: 1.3 % (ref 0–8)
ERYTHROCYTE [DISTWIDTH] IN BLOOD BY AUTOMATED COUNT: 14.7 % (ref 11.5–14.5)
EST. GFR  (AFRICAN AMERICAN): 44.9 ML/MIN/1.73 M^2
EST. GFR  (AFRICAN AMERICAN): 55.2 ML/MIN/1.73 M^2
EST. GFR  (NON AFRICAN AMERICAN): 38.8 ML/MIN/1.73 M^2
EST. GFR  (NON AFRICAN AMERICAN): 47.8 ML/MIN/1.73 M^2
GLUCOSE SERPL-MCNC: 114 MG/DL (ref 70–110)
GLUCOSE SERPL-MCNC: 91 MG/DL (ref 70–110)
HCO3 UR-SCNC: 28.3 MMOL/L (ref 24–28)
HCT VFR BLD AUTO: 28.9 % (ref 40–54)
HGB BLD-MCNC: 8.8 G/DL (ref 14–18)
IMM GRANULOCYTES # BLD AUTO: 0.02 K/UL (ref 0–0.04)
IMM GRANULOCYTES NFR BLD AUTO: 0.4 % (ref 0–0.5)
LACTATE SERPL-SCNC: 1 MMOL/L (ref 0.5–2.2)
LDH SERPL L TO P-CCNC: 1216 U/L (ref 110–260)
LYMPHOCYTES # BLD AUTO: 0.7 K/UL (ref 1–4.8)
LYMPHOCYTES NFR BLD: 12.3 % (ref 18–48)
MAGNESIUM SERPL-MCNC: 1.9 MG/DL (ref 1.6–2.6)
MAGNESIUM SERPL-MCNC: 1.9 MG/DL (ref 1.6–2.6)
MCH RBC QN AUTO: 26.2 PG (ref 27–31)
MCHC RBC AUTO-ENTMCNC: 30.4 G/DL (ref 32–36)
MCV RBC AUTO: 86 FL (ref 82–98)
MODE: ABNORMAL
MONOCYTES # BLD AUTO: 1 K/UL (ref 0.3–1)
MONOCYTES NFR BLD: 17 % (ref 4–15)
NEUTROPHILS # BLD AUTO: 3.9 K/UL (ref 1.8–7.7)
NEUTROPHILS NFR BLD: 68.8 % (ref 38–73)
NRBC BLD-RTO: 0 /100 WBC
PCO2 BLDA: 48.1 MMHG (ref 35–45)
PH SMN: 7.38 [PH] (ref 7.35–7.45)
PLATELET # BLD AUTO: 206 K/UL (ref 150–450)
PMV BLD AUTO: 10.9 FL (ref 9.2–12.9)
PO2 BLDA: 29 MMHG (ref 40–60)
POC BE: 3 MMOL/L
POC SATURATED O2: 52 % (ref 95–100)
POC TCO2: 30 MMOL/L (ref 24–29)
POTASSIUM SERPL-SCNC: 3.6 MMOL/L (ref 3.5–5.1)
POTASSIUM SERPL-SCNC: 3.9 MMOL/L (ref 3.5–5.1)
POTASSIUM SERPL-SCNC: 4 MMOL/L (ref 3.5–5.1)
RBC # BLD AUTO: 3.36 M/UL (ref 4.6–6.2)
SAMPLE: ABNORMAL
SITE: ABNORMAL
SODIUM SERPL-SCNC: 134 MMOL/L (ref 136–145)
SODIUM SERPL-SCNC: 135 MMOL/L (ref 136–145)
WBC # BLD AUTO: 5.59 K/UL (ref 3.9–12.7)

## 2022-07-09 PROCEDURE — 80048 BASIC METABOLIC PNL TOTAL CA: CPT | Performed by: INTERNAL MEDICINE

## 2022-07-09 PROCEDURE — 25000003 PHARM REV CODE 250: Performed by: STUDENT IN AN ORGANIZED HEALTH CARE EDUCATION/TRAINING PROGRAM

## 2022-07-09 PROCEDURE — 63600175 PHARM REV CODE 636 W HCPCS: Performed by: STUDENT IN AN ORGANIZED HEALTH CARE EDUCATION/TRAINING PROGRAM

## 2022-07-09 PROCEDURE — 83735 ASSAY OF MAGNESIUM: CPT | Mod: 91 | Performed by: INTERNAL MEDICINE

## 2022-07-09 PROCEDURE — 85730 THROMBOPLASTIN TIME PARTIAL: CPT | Performed by: INTERNAL MEDICINE

## 2022-07-09 PROCEDURE — 82803 BLOOD GASES ANY COMBINATION: CPT

## 2022-07-09 PROCEDURE — 84132 ASSAY OF SERUM POTASSIUM: CPT | Performed by: INTERNAL MEDICINE

## 2022-07-09 PROCEDURE — 25000003 PHARM REV CODE 250: Performed by: INTERNAL MEDICINE

## 2022-07-09 PROCEDURE — 27000248 HC VAD-ADDITIONAL DAY

## 2022-07-09 PROCEDURE — 93750 INTERROGATION VAD IN PERSON: CPT | Mod: ,,, | Performed by: INTERNAL MEDICINE

## 2022-07-09 PROCEDURE — 63600175 PHARM REV CODE 636 W HCPCS: Performed by: INTERNAL MEDICINE

## 2022-07-09 PROCEDURE — 83605 ASSAY OF LACTIC ACID: CPT | Performed by: STUDENT IN AN ORGANIZED HEALTH CARE EDUCATION/TRAINING PROGRAM

## 2022-07-09 PROCEDURE — 94761 N-INVAS EAR/PLS OXIMETRY MLT: CPT

## 2022-07-09 PROCEDURE — 83735 ASSAY OF MAGNESIUM: CPT | Performed by: INTERNAL MEDICINE

## 2022-07-09 PROCEDURE — 83615 LACTATE (LD) (LDH) ENZYME: CPT | Performed by: STUDENT IN AN ORGANIZED HEALTH CARE EDUCATION/TRAINING PROGRAM

## 2022-07-09 PROCEDURE — 99233 SBSQ HOSP IP/OBS HIGH 50: CPT | Mod: ,,, | Performed by: INTERNAL MEDICINE

## 2022-07-09 PROCEDURE — 20000000 HC ICU ROOM

## 2022-07-09 PROCEDURE — 99900035 HC TECH TIME PER 15 MIN (STAT)

## 2022-07-09 PROCEDURE — 93750 PR INTERROGATE VENT ASSIST DEV, IN PERSON, W PHYSICIAN ANALYSIS: ICD-10-PCS | Mod: ,,, | Performed by: INTERNAL MEDICINE

## 2022-07-09 PROCEDURE — 99233 PR SUBSEQUENT HOSPITAL CARE,LEVL III: ICD-10-PCS | Mod: ,,, | Performed by: INTERNAL MEDICINE

## 2022-07-09 PROCEDURE — 85025 COMPLETE CBC W/AUTO DIFF WBC: CPT | Performed by: STUDENT IN AN ORGANIZED HEALTH CARE EDUCATION/TRAINING PROGRAM

## 2022-07-09 PROCEDURE — 85730 THROMBOPLASTIN TIME PARTIAL: CPT | Mod: 91 | Performed by: INTERNAL MEDICINE

## 2022-07-09 PROCEDURE — 80048 BASIC METABOLIC PNL TOTAL CA: CPT | Mod: 91 | Performed by: INTERNAL MEDICINE

## 2022-07-09 RX ORDER — POTASSIUM CHLORIDE 750 MG/1
10 CAPSULE, EXTENDED RELEASE ORAL ONCE
Status: COMPLETED | OUTPATIENT
Start: 2022-07-09 | End: 2022-07-09

## 2022-07-09 RX ORDER — MAGNESIUM SULFATE HEPTAHYDRATE 40 MG/ML
2 INJECTION, SOLUTION INTRAVENOUS ONCE
Status: COMPLETED | OUTPATIENT
Start: 2022-07-09 | End: 2022-07-09

## 2022-07-09 RX ORDER — FUROSEMIDE 10 MG/ML
80 INJECTION INTRAMUSCULAR; INTRAVENOUS ONCE
Status: COMPLETED | OUTPATIENT
Start: 2022-07-09 | End: 2022-07-09

## 2022-07-09 RX ORDER — POTASSIUM CHLORIDE 20 MEQ/1
40 TABLET, EXTENDED RELEASE ORAL ONCE
Status: COMPLETED | OUTPATIENT
Start: 2022-07-09 | End: 2022-07-09

## 2022-07-09 RX ADMIN — MEXILETINE HYDROCHLORIDE 250 MG: 250 CAPSULE ORAL at 02:07

## 2022-07-09 RX ADMIN — MEXILETINE HYDROCHLORIDE 250 MG: 250 CAPSULE ORAL at 09:07

## 2022-07-09 RX ADMIN — POTASSIUM CHLORIDE 40 MEQ: 1500 TABLET, EXTENDED RELEASE ORAL at 06:07

## 2022-07-09 RX ADMIN — PANTOPRAZOLE SODIUM 40 MG: 40 TABLET, DELAYED RELEASE ORAL at 11:07

## 2022-07-09 RX ADMIN — ALLOPURINOL 100 MG: 100 TABLET ORAL at 08:07

## 2022-07-09 RX ADMIN — EPTIFIBATIDE 2 MCG/KG/MIN: 0.75 INJECTION INTRAVENOUS at 07:07

## 2022-07-09 RX ADMIN — BUSPIRONE HYDROCHLORIDE 5 MG: 5 TABLET ORAL at 08:07

## 2022-07-09 RX ADMIN — HEPARIN SODIUM 22 UNITS/KG/HR: 5000 INJECTION INTRAVENOUS; SUBCUTANEOUS at 05:07

## 2022-07-09 RX ADMIN — ASPIRIN 325 MG: 325 TABLET, COATED ORAL at 08:07

## 2022-07-09 RX ADMIN — MEXILETINE HYDROCHLORIDE 250 MG: 250 CAPSULE ORAL at 05:07

## 2022-07-09 RX ADMIN — BUSPIRONE HYDROCHLORIDE 5 MG: 5 TABLET ORAL at 09:07

## 2022-07-09 RX ADMIN — EPTIFIBATIDE 2 MCG/KG/MIN: 0.75 INJECTION INTRAVENOUS at 02:07

## 2022-07-09 RX ADMIN — MAGNESIUM SULFATE HEPTAHYDRATE 2 G: 2 INJECTION, SOLUTION INTRAVENOUS at 06:07

## 2022-07-09 RX ADMIN — AMIODARONE HYDROCHLORIDE 400 MG: 200 TABLET ORAL at 08:07

## 2022-07-09 RX ADMIN — EPTIFIBATIDE 2 MCG/KG/MIN: 0.75 INJECTION INTRAVENOUS at 01:07

## 2022-07-09 RX ADMIN — EPTIFIBATIDE 2 MCG/KG/MIN: 0.75 INJECTION INTRAVENOUS at 06:07

## 2022-07-09 RX ADMIN — ALPRAZOLAM 1 MG: 0.5 TABLET ORAL at 04:07

## 2022-07-09 RX ADMIN — BUSPIRONE HYDROCHLORIDE 5 MG: 5 TABLET ORAL at 02:07

## 2022-07-09 RX ADMIN — HEPARIN SODIUM 22 UNITS/KG/HR: 5000 INJECTION INTRAVENOUS; SUBCUTANEOUS at 06:07

## 2022-07-09 RX ADMIN — FUROSEMIDE 80 MG: 10 INJECTION, SOLUTION INTRAMUSCULAR; INTRAVENOUS at 02:07

## 2022-07-09 RX ADMIN — POTASSIUM CHLORIDE 10 MEQ: 10 CAPSULE, COATED, EXTENDED RELEASE ORAL at 03:07

## 2022-07-09 RX ADMIN — AMLODIPINE BESYLATE 10 MG: 10 TABLET ORAL at 08:07

## 2022-07-09 RX ADMIN — LEVOTHYROXINE SODIUM 112 MCG: 112 TABLET ORAL at 05:07

## 2022-07-09 NOTE — NURSING
Cardiac ICU Care Plan    POC reviewed with Tim Somers Maurice and family. Questions and concerns addressed. Plan for LVAD pump exchange on wednesday. Pt progressing toward goals. Will continue to monitor. See below and flowsheets for full assessment and VS info.       Neuro:  [place] Coma Scale  Best Eye Response: 4-->(E4) spontaneous  Best Motor Response: 6-->(M6) obeys commands  Best Verbal Response: 5-->(V5) oriented  [place] Coma Scale Score: 15  Assessment Qualifiers: patient not sedated/intubated  Pupil PERRLA: yes    24 hr Temp:  [98.1 °F (36.7 °C)-99 °F (37.2 °C)]      CV:  Rhythm: normal sinus rhythm   DVT prophylaxis: VTE Required Core Measure: Pharmacological prophylaxis initiated/maintained    CVP (mean): 7 mmHg (07/09/22 0701)       SVO2 (%): 63 % (07/05/22 0400)       Type: HeartMate II       Pulses  Right Radial Pulse: Doppler  Left Radial Pulse: Doppler  Right Dorsalis Pedis Pulse: Doppler  Left Dorsalis Pedis Pulse: Doppler  Right Posterior Tibial Pulse: Doppler  Left Posterior Tibial Pulse: Doppler    Resp:  O2 Device (Oxygen Therapy): room air  Flow (L/min): 2  Oxygen Concentration (%): 95    GI/:  GI prophylaxis: no  Diet/Nutrition Received: 2 gram sodium, low saturated fat/low cholesterol  Last Bowel Movement: 07/08/22  Voiding Characteristics: voids spontaneously without difficulty   Intake/Output Summary (Last 24 hours) at 7/9/2022 0731  Last data filed at 7/9/2022 0701  Gross per 24 hour   Intake 1561.25 ml   Output 1950 ml   Net -388.75 ml          Labs/Accuchecks:  Recent Labs   Lab 07/06/22  0442 07/07/22  0315 07/08/22  0348   WBC 6.19 7.59 7.25   RBC 3.68* 3.68* 3.48*   HGB 9.7* 9.8* 9.2*   HCT 30.9* 32.1* 30.5*    213 216      Recent Labs   Lab 07/03/22  0318 07/04/22  0330 07/05/22  0400 07/07/22  1814 07/07/22  2346 07/09/22  0426   INR 1.4* 1.3*  --   --   --   --    APTT 41.5* 39.1*   < > 41.6* 44.8* 37.1*    < > = values in this interval not displayed.      Recent Labs      07/09/22  0426   *   K 3.6   CO2 25      BUN 21*   CREATININE 1.6*     No results for input(s): CPK, CPKMB, MB, TROPONINI in the last 168 hours.   Recent Labs     07/07/22  0509 07/08/22  0429 07/09/22  0429   PH 7.392 7.374 7.378   PCO2 47.9* 44.0 48.1*   PO2 28* 27* 29*   HCO3 29.1* 25.6 28.3*   POCSATURATED 52* 48* 52*   BE 4 0 3       Electrolytes: Electrolytes replaced  Accuchecks: none    Gtts/LDAs:   eptifibatide 2 mcg/kg/min (07/09/22 0725)    heparin (porcine) in D5W 22 Units/kg/hr (07/09/22 0701)       Lines/Drains/Airways     Central Venous Catheter Line  Duration           Percutaneous Central Line Insertion/Assessment - Triple Lumen  07/02/22 1522 right internal jugular 6 days          Arterial Line  Duration           Arterial Line 07/02/22 1545 Left Radial 6 days          Line  Duration                VAD 03/08/18 1124 Left ventricular assist device HeartMate II 1583 days          Peripheral Intravenous Line  Duration                Midline Catheter Insertion/Assessment  - Single Lumen 06/28/22 1027 Right cephalic vein (lateral side of arm) 20g x 8cm 10 days                Skin/Wounds  Bathing/Skin Care: linen changed;dressed/undressed (07/07/22 1505)  Wounds: No

## 2022-07-09 NOTE — PROGRESS NOTES
[facility] - Cardiac Intensive Care  Heart Transplant  Progress Note    Patient Name: Tim Richards  MRN: [id number]  Admission Date: 6/27/2022  Hospital Length of Stay: 12 days  Attending Physician: Isaiah Santizo MD  Primary Care Provider: Deyanira Booth MD  Principal Problem:Left ventricular assist device (LVAD) complication    Subjective:     Interval History:   No acute events overnight  The patient states that his shortness of breath is improving  Surgery re-evaluated the patient and is planning for upgrade to 3    CVP: 10  CO: 6.74  CI: 2.98  SVR: 724    Continuous Infusions:   eptifibatide 2 mcg/kg/min (07/09/22 0725)    heparin (porcine) in D5W 22 Units/kg/hr (07/09/22 0701)     Scheduled Meds:   allopurinoL  100 mg Oral Daily    amiodarone  400 mg Oral Daily    amLODIPine  10 mg Oral Daily    aspirin  325 mg Oral Daily    busPIRone  5 mg Oral TID    levothyroxine  112 mcg Oral Before breakfast    LIDOcaine  1 patch Transdermal Q24H    mexiletine  250 mg Oral Q8H    pantoprazole  40 mg Oral Daily with lunch     PRN Meds:acetaminophen, ALPRAZolam, famotidine, sodium chloride 0.9%, zolpidem    Review of patient's allergies indicates:   Allergen Reactions    Lisinopril Anaphylaxis    Hydralazine analogues      Chronic constipation, impotence, dizziness     Objective:     Vital Signs (Most Recent):  Temp: 98.1 °F (36.7 °C) (07/09/22 0701)  Pulse: 80 (07/09/22 0749)  Resp: 20 (07/09/22 0749)  BP: (!) 80/0 (07/09/22 0405)  SpO2: 95 % (07/09/22 0405)   Vital Signs (24h Range):  Temp:  [98.1 °F (36.7 °C)-99 °F (37.2 °C)] 98.1 °F (36.7 °C)  Pulse:  [76-94] 80  Resp:  [16-49] 20  SpO2:  [95 %] 95 %  BP: ()/(0-78) 80/0  Arterial Line BP: (70-93)/(49-83) 80/68     Patient Vitals for the past 72 hrs (Last 3 readings):   Weight   07/08/22 1000 102.1 kg (225 lb)   07/07/22 0400 102.2 kg (225 lb 5 oz)     Body mass index is 29.69 kg/m².      Intake/Output Summary (Last 24 hours) at 7/9/2022  0849  Last data filed at 7/9/2022 0701  Gross per 24 hour   Intake 1535.23 ml   Output 1950 ml   Net -414.77 ml       Hemodynamic Parameters:       Telemetry: No events on telemetry    Physical Exam  Constitutional:       General: He is not in acute distress.     Appearance: He is obese.   HENT:      Head: Normocephalic.      Mouth/Throat:      Mouth: Mucous membranes are moist.   Eyes:      Extraocular Movements: Extraocular movements intact.   Cardiovascular:      Rate and Rhythm: Normal rate and regular rhythm.      Comments: VAD hum appreciated  Pulses detected via doppler  Pulmonary:      Effort: No respiratory distress.      Breath sounds: Normal breath sounds.      Comments: Coarse breath sounds at the bases bilaterally  Abdominal:      General: Bowel sounds are normal. There is no distension.      Palpations: Abdomen is soft.      Tenderness: There is no abdominal tenderness.   Musculoskeletal:      Cervical back: Neck supple.   Skin:     General: Skin is warm.   Neurological:      General: No focal deficit present.      Mental Status: He is oriented to person, place, and time.   Psychiatric:         Mood and Affect: Mood normal.         Behavior: Behavior normal.       Significant Labs:  CBC:  Recent Labs   Lab 07/06/22  0442 07/07/22  0315 07/08/22  0348   WBC 6.19 7.59 7.25   RBC 3.68* 3.68* 3.48*   HGB 9.7* 9.8* 9.2*   HCT 30.9* 32.1* 30.5*    213 216   MCV 84 87 88   MCH 26.4* 26.6* 26.4*   MCHC 31.4* 30.5* 30.2*     BNP:  No results for input(s): BNP in the last 168 hours.    Invalid input(s): BNPTRIAGELBLO  CMP:  Recent Labs   Lab 07/03/22  0318 07/03/22  0904 07/04/22  0330 07/04/22  0825 07/08/22  0348 07/08/22  1717 07/09/22  0426   GLU 98   < > 100   < > 86 114* 91   CALCIUM 9.9   < > 10.0   < > 9.9 9.9 9.8   ALBUMIN 3.1*  --  3.2*  --   --   --   --    PROT 7.8  --  8.1  --   --   --   --    *   < > 132*   < > 132* 134* 135*   K 3.4*   < > 3.4*   < > 4.0 3.6 3.6   CO2 29   < > 28   <  > 23 25 25   CL 94*   < > 94*   < > 100 99 100   BUN 40*   < > 37*   < > 22* 23* 21*   CREATININE 2.0*   < > 2.3*   < > 1.7* 1.8* 1.6*   ALKPHOS 96  --  102  --   --   --   --    ALT 58*  --  57*  --   --   --   --    *  --  101*  --   --   --   --    BILITOT 3.3*  --  3.0*  --   --   --   --     < > = values in this interval not displayed.      Coagulation:   Recent Labs   Lab 07/03/22  0318 07/04/22  0330 07/05/22  0400 07/07/22  1814 07/07/22  2346 07/09/22  0426   INR 1.4* 1.3*  --   --   --   --    APTT 41.5* 39.1*   < > 41.6* 44.8* 37.1*    < > = values in this interval not displayed.     LDH:  Recent Labs   Lab 07/07/22  0315 07/07/22 1814 07/08/22  0348 07/09/22  0426   LDH 1,345* 1,355* 1,384* 1,216*     Microbiology:  Microbiology Results (last 7 days)       ** No results found for the last 168 hours. **            I have reviewed all pertinent labs within the past 24 hours.    Estimated Creatinine Clearance: 65.5 mL/min (A) (based on SCr of 1.6 mg/dL (H)).    Diagnostic Results:  I have reviewed and interpreted all pertinent imaging results/findings within the past 24 hours.    Assessment and Plan:     55 Y/O M with Hx of stage D HFrEF (EF 10%) due to NICM underwent HM2 implant 3/8/18 and exchange of outflow graft 3/9/18, Hx VT/VF s/p SICD 2014 presenting with gradual worsening shortness of breath associated with nonpleuritic, nonradiating bilateral 4/10 retrosternal chest pressure pain.  Symptoms began yesterday and have gradually worsened in the last 12 hours.  He does report going to a family picnic with increased consumption of chicken wings, ribs and other salty meat products.  Prior to symptom onset, he reports nausea, nonbloody diarrhea began yesterday and single episode of nonbloody nonbilious vomiting this morning. He also complains of dizziness, lightheadedness, and a mild HA. SOB worsened this morning prompting him to come to the ED.     In the ED, patient was in respiratory distress  requiring BiPAP. MAP 96 and started on Nitro gtt. Work-up revealed WBC 10, K 5.4, Cr 2.5 (baseline 1.9), BNP 1950 (baseline 200-300s), Bili 2.1, LDH 1058, and INR 3.2. Bedside TTE with severely reduced EF, AV not opening, septum bulging into RV. Given IV Lasix 80 mg x1 with only 100-200 mL UOP and dark in color. HM2 interrogated and flows have been 8.5-9 L/min with no alarms or power surges. Cardiology consulted in the ED, dicussed with HTS, and decision made to admit to ICU for further mgmt.       * Left ventricular assist device (LVAD) complication  -Usually an elevated LDH is a red flag in a patient with LVAD, especially HM2 given the risk of pump thrombosis. PI, power and flow are elevated when compared to prior clinic visits  -LDH is now downtrending, but there is continued concern for red cell shedding  -Haptoglobin low (although it has been chronically low)  -Direct and indirect wendy test are negative  -Plasma free hemoglobin elevated  - concerned that he could have some degree of thrombosis, since adding a second antiplatelet agent didn't resolved it, Integrilin with a Heparin drip were started until we have lab confirmation that this issue that resolved (normalization of LDH etc)  -Integrilin increased to 2 mcg/kg/min on 7/8/22  -Heparin drip  -Aspirin 325mg daily  -CTS planning for upgrade to HM3 next week    Procedure: Device Interrogation Including analysis of device parameters  Current Settings: Ventricular Assist Device    TXP LVAD INTERROGATIONS 7/9/2022 7/9/2022 7/9/2022 7/9/2022 7/9/2022 7/9/2022 7/9/2022   Type HeartMate II HeartMate II HeartMate II HeartMate II HeartMate II HeartMate II HeartMate II   Flow 7.1 7.2 7.5 7.8 7.6 7.5 7.7   Speed 9800 9800 9800 9800 9800 9800 9800   PI 2.4 2.5 3.2 3.1 2.9 3.5 3.6   Power ([place]) 7.4 7.5 7.6 7.8 7.7 7.6 7.7   LSL - 9400 - - - - -   Pulsatility Intermittent pulse Intermittent pulse - - - - -       History of ventricular tachycardia  Patient experienced  an episode of VT on 6/30 and experienced an ICD shock thought to be related to hypokalemia (K of 3.1 on 6/30)  - Plan to aggressively replete K, keep K>4 and Mg>2  - Will continue mexiletine 25mg q8hrs and amiodarone 400mg daily    COVID-19  -Previously hypoxic, now off oxygen  -ID was consulted, recommended Remdesivir, course completed    Elevated serum lactate dehydrogenase (LDH)  Concerned for pump thrombosis  Cont to trend  Plan for possible pump exchange next week      amiodarone induced hypothyrodism  -Continue levothyroxine 112 mcg     LVAD (left ventricular assist device) present  Procedure: Device Interrogation Including analysis of device parameters  Current Settings: Ventricular Assist Device  Review of device function is stable    TXP LVAD INTERROGATIONS 7/9/2022 7/9/2022 7/9/2022 7/9/2022 7/9/2022 7/9/2022 7/9/2022   Type HeartMate II HeartMate II HeartMate II HeartMate II HeartMate II HeartMate II HeartMate II   Flow 7.1 7.2 7.5 7.8 7.6 7.5 7.7   Speed 9800 9800 9800 9800 9800 9800 9800   PI 2.4 2.5 3.2 3.1 2.9 3.5 3.6   Power ([place]) 7.4 7.5 7.6 7.8 7.7 7.6 7.7   LSL - 9400 - - - - -   Pulsatility Intermittent pulse Intermittent pulse - - - - -       CKD (chronic kidney disease), stage III  WAGNER on CKD  Improved and back to patient's baseline of 1.5-2.0  - Avoiding nephrotoxic medications  - Continue to monitor  - Strict I/O's    Chronic combined systolic and diastolic heart failure  #ADHF  #NICMP  -Admitted with acute decompensated HF presence of HM2 LVAD  -Continue Amlodipine 10mg daily  -Holding Warfarin, Heparin started instead  -Speed echo at 9800 showed bowing to the right and severe [place] [id number], IVS was at midline, MR decreased but worse AR  -Was placed on lasix gtt but appeared euvolemic and lasix gtt was stopped, administering Lasix 80 mg IV x 1 today        Harry Canales MD  Heart Transplant  [facility] - Cardiac Intensive Care

## 2022-07-09 NOTE — ASSESSMENT & PLAN NOTE
-Usually an elevated LDH is a red flag in a patient with LVAD, especially HM2 given the risk of pump thrombosis. PI, power and flow are elevated when compared to prior clinic visits  -LDH is now downtrending, but there is continued concern for red cell shedding  -Haptoglobin low (although it has been chronically low)  -Direct and indirect wendy test are negative  -Plasma free hemoglobin elevated  - concerned that he could have some degree of thrombosis, since adding a second antiplatelet agent didn't resolved it, Integrilin with a Heparin drip were started until we have lab confirmation that this issue that resolved (normalization of LDH etc)  -Integrilin increased to 2 mcg/kg/min on 7/8/22  -Heparin drip  -Aspirin 325mg daily  -CTS planning for upgrade to 3 next week    Procedure: Device Interrogation Including analysis of device parameters  Current Settings: Ventricular Assist Device    TXP LVAD INTERROGATIONS 7/9/2022 7/9/2022 7/9/2022 7/9/2022 7/9/2022 7/9/2022 7/9/2022   Type HeartMate II HeartMate II HeartMate II HeartMate II HeartMate II HeartMate II HeartMate II   Flow 7.1 7.2 7.5 7.8 7.6 7.5 7.7   Speed 9800 9800 9800 9800 9800 9800 9800   PI 2.4 2.5 3.2 3.1 2.9 3.5 3.6   Power ([place]) 7.4 7.5 7.6 7.8 7.7 7.6 7.7   [facility] - 9400 - - - - -   Pulsatility Intermittent pulse Intermittent pulse - - - - -

## 2022-07-09 NOTE — ASSESSMENT & PLAN NOTE
WAGNER on CKD  Improved and back to patient's baseline of 1.5-2.0  - Avoiding nephrotoxic medications  - Continue to monitor  - Strict I/O's

## 2022-07-09 NOTE — ASSESSMENT & PLAN NOTE
Procedure: Device Interrogation Including analysis of device parameters  Current Settings: Ventricular Assist Device  Review of device function is stable    TXP LVAD INTERROGATIONS 7/9/2022 7/9/2022 7/9/2022 7/9/2022 7/9/2022 7/9/2022 7/9/2022   Type HeartMate II HeartMate II HeartMate II HeartMate II HeartMate II HeartMate II HeartMate II   Flow 7.1 7.2 7.5 7.8 7.6 7.5 7.7   Speed 9800 9800 9800 9800 9800 9800 9800   PI 2.4 2.5 3.2 3.1 2.9 3.5 3.6   Power ([place]) 7.4 7.5 7.6 7.8 7.7 7.6 7.7   [facility] - 9400 - - - - -   Pulsatility Intermittent pulse Intermittent pulse - - - - -

## 2022-07-09 NOTE — PROGRESS NOTES
07/09/2022  Estephania Martinez    Current provider:  Isaiah Santizo MD    Device interrogation:  TXP LVAD INTERROGATIONS 7/9/2022 7/9/2022 7/9/2022 7/9/2022 7/9/2022 7/9/2022 7/9/2022   Type HeartMate II HeartMate II HeartMate II HeartMate II HeartMate II HeartMate II HeartMate II   Flow 6.8 6.7 7.1 6.8 7.0 7.0 6.9   Speed 9800 9800 9800 9800 9800 9800 9800   PI 2.7 3.0 2.4 2.7 2.8 2.5 2.6   Power ([place]) 7.3 7.2 7.4 7.3 7.4 7.3 7.3   LSL - - - - - - -   Pulsatility Intermittent pulse Intermittent pulse Intermittent pulse Intermittent pulse Intermittent pulse Intermittent pulse Intermittent pulse          Rounded on Tim Richards to ensure all mechanical assist device settings (IABP or VAD) were appropriate and all parameters were within limits.  I was able to ensure all back up equipment was present, the staff had no issues, and the Perfusion Department daily rounding was complete.      For implantable VADs: Interrogation of Ventricular assist device was performed with analysis of device parameters and review of device function. I have personally reviewed the interrogation findings and agree with findings as stated.     In emergency, the nursing units have been notified to contact the perfusion department either by:  Calling [phone] from 630am to 4pm Mon thru Fri, utilizing the On-Call Finder functionality of Epic and searching for Perfusion, or by contacting the hospital  from 4pm to 630am and on weekends and asking to speak with the perfusionist on call.    4:59 PM

## 2022-07-09 NOTE — ASSESSMENT & PLAN NOTE
#ADHF  #NICMP  -Admitted with acute decompensated HF presence of HM2 LVAD  -Continue Amlodipine 10mg daily  -Holding Warfarin, Heparin started instead  -Speed echo at 9800 showed bowing to the right [place] [id number], IVS was at midline, MR decreased but worse AR  -Was placed on lasix gtt but appeared euvolemic and lasix gtt was stopped, administering Lasix 80 mg IV x 1 today

## 2022-07-09 NOTE — SUBJECTIVE & OBJECTIVE
Interval History:   No acute events overnight  The patient states that his shortness of breath is improving  Surgery re-evaluated the patient and is planning for upgrade to 3    CVP: 10  CO: 6.74  CI: 2.98  SVR: 724    Continuous Infusions:   eptifibatide 2 mcg/kg/min (07/09/22 0725)    heparin (porcine) in D5W 22 Units/kg/hr (07/09/22 0701)     Scheduled Meds:   allopurinoL  100 mg Oral Daily    amiodarone  400 mg Oral Daily    amLODIPine  10 mg Oral Daily    aspirin  325 mg Oral Daily    busPIRone  5 mg Oral TID    levothyroxine  112 mcg Oral Before breakfast    LIDOcaine  1 patch Transdermal Q24H    mexiletine  250 mg Oral Q8H    pantoprazole  40 mg Oral Daily with lunch     PRN Meds:acetaminophen, ALPRAZolam, famotidine, sodium chloride 0.9%, zolpidem    Review of patient's allergies indicates:   Allergen Reactions    Lisinopril Anaphylaxis    Hydralazine analogues      Chronic constipation, impotence, dizziness     Objective:     Vital Signs (Most Recent):  Temp: 98.1 °F (36.7 °C) (07/09/22 0701)  Pulse: 80 (07/09/22 0749)  Resp: 20 (07/09/22 0749)  BP: (!) 80/0 (07/09/22 0405)  SpO2: 95 % (07/09/22 0405)   Vital Signs (24h Range):  Temp:  [98.1 °F (36.7 °C)-99 °F (37.2 °C)] 98.1 °F (36.7 °C)  Pulse:  [76-94] 80  Resp:  [16-49] 20  SpO2:  [95 %] 95 %  BP: ()/(0-78) 80/0  Arterial Line BP: (70-93)/(49-83) 80/68     Patient Vitals for the past 72 hrs (Last 3 readings):   Weight   07/08/22 1000 102.1 kg (225 lb)   07/07/22 0400 102.2 kg (225 lb 5 oz)     Body mass index is 29.69 kg/m².      Intake/Output Summary (Last 24 hours) at 7/9/2022 0849  Last data filed at 7/9/2022 0701  Gross per 24 hour   Intake 1535.23 ml   Output 1950 ml   Net -414.77 ml       Hemodynamic Parameters:       Telemetry: No events on telemetry    Physical Exam  Constitutional:       General: He is not in acute distress.     Appearance: He is obese.   HENT:      Head: Normocephalic.      Mouth/Throat:      Mouth: Mucous membranes  are moist.   Eyes:      Extraocular Movements: Extraocular movements intact.   Cardiovascular:      Rate and Rhythm: Normal rate and regular rhythm.      Comments: VAD hum appreciated  Pulses detected via doppler  Pulmonary:      Effort: No respiratory distress.      Breath sounds: Normal breath sounds.      Comments: Coarse breath sounds at the bases bilaterally  Abdominal:      General: Bowel sounds are normal. There is no distension.      Palpations: Abdomen is soft.      Tenderness: There is no abdominal tenderness.   Musculoskeletal:      Cervical back: Neck supple.   Skin:     General: Skin is warm.   Neurological:      General: No focal deficit present.      Mental Status: He is oriented to person, place, and time.   Psychiatric:         Mood and Affect: Mood normal.         Behavior: Behavior normal.       Significant Labs:  CBC:  Recent Labs   Lab 07/06/22  0442 07/07/22  0315 07/08/22  0348   WBC 6.19 7.59 7.25   RBC 3.68* 3.68* 3.48*   HGB 9.7* 9.8* 9.2*   HCT 30.9* 32.1* 30.5*    213 216   MCV 84 87 88   MCH 26.4* 26.6* 26.4*   MCHC 31.4* 30.5* 30.2*     BNP:  No results for input(s): BNP in the last 168 hours.    Invalid input(s): BNPTRIAGELBLO  CMP:  Recent Labs   Lab 07/03/22  0318 07/03/22  0904 07/04/22  0330 07/04/22  0825 07/08/22  0348 07/08/22  1717 07/09/22  0426   GLU 98   < > 100   < > 86 114* 91   CALCIUM 9.9   < > 10.0   < > 9.9 9.9 9.8   ALBUMIN 3.1*  --  3.2*  --   --   --   --    PROT 7.8  --  8.1  --   --   --   --    *   < > 132*   < > 132* 134* 135*   K 3.4*   < > 3.4*   < > 4.0 3.6 3.6   CO2 29   < > 28   < > 23 25 25   CL 94*   < > 94*   < > 100 99 100   BUN 40*   < > 37*   < > 22* 23* 21*   CREATININE 2.0*   < > 2.3*   < > 1.7* 1.8* 1.6*   ALKPHOS 96  --  102  --   --   --   --    ALT 58*  --  57*  --   --   --   --    *  --  101*  --   --   --   --    BILITOT 3.3*  --  3.0*  --   --   --   --     < > = values in this interval not displayed.      Coagulation:    Recent Labs   Lab 07/03/22  0318 07/04/22  0330 07/05/22  0400 07/07/22  1814 07/07/22  2346 07/09/22  0426   INR 1.4* 1.3*  --   --   --   --    APTT 41.5* 39.1*   < > 41.6* 44.8* 37.1*    < > = values in this interval not displayed.     LDH:  Recent Labs   Lab 07/07/22  0315 07/07/22  1814 07/08/22  0348 07/09/22  0426   LDH 1,345* 1,355* 1,384* 1,216*     Microbiology:  Microbiology Results (last 7 days)       ** No results found for the last 168 hours. **            I have reviewed all pertinent labs within the past 24 hours.    Estimated Creatinine Clearance: 65.5 mL/min (A) (based on SCr of 1.6 mg/dL (H)).    Diagnostic Results:  I have reviewed and interpreted all pertinent imaging results/findings within the past 24 hours.

## 2022-07-09 NOTE — PLAN OF CARE
Cardiac ICU Care Plan    POC reviewed with Tim Richards and family. Questions and concerns addressed. Pt VSS. No alarms on LVAD, replacement scheduled for Wednesday. No acute events today. Pt progressing toward goals. Will continue to monitor. See below and flowsheets for full assessment and VS info.       Neuro:  [place] Coma Scale  Best Eye Response: 4-->(E4) spontaneous  Best Motor Response: 6-->(M6) obeys commands  Best Verbal Response: 5-->(V5) oriented  [place] Coma Scale Score: 15  Assessment Qualifiers: patient not sedated/intubated  Pupil PERRLA: yes    24 hr Temp:  [98.1 °F (36.7 °C)-99 °F (37.2 °C)]      CV:  Rhythm: normal sinus rhythm   DVT prophylaxis: VTE Required Core Measure: Pharmacological prophylaxis initiated/maintained    CVP (mean): 7 mmHg (07/09/22 1101)       SVO2 (%): 63 % (07/05/22 0400)       Type: HeartMate II       Pulses  Right Radial Pulse: Doppler  Left Radial Pulse: Doppler  Right Dorsalis Pedis Pulse: Doppler  Left Dorsalis Pedis Pulse: Doppler  Right Posterior Tibial Pulse: Doppler  Left Posterior Tibial Pulse: Doppler    Resp:  O2 Device (Oxygen Therapy): room air  Flow (L/min): 2  Oxygen Concentration (%): 95    GI/:  GI prophylaxis: no  Diet/Nutrition Received: 2 gram sodium, low saturated fat/low cholesterol  Last Bowel Movement: 07/08/22  Voiding Characteristics: voids spontaneously without difficulty   Intake/Output Summary (Last 24 hours) at 7/9/2022 1616  Last data filed at 7/9/2022 1600  Gross per 24 hour   Intake 1247.81 ml   Output 2175 ml   Net -927.19 ml          Labs/Accuchecks:  Recent Labs   Lab 07/07/22  0315 07/08/22  0348 07/09/22  0843   WBC 7.59 7.25 5.59   RBC 3.68* 3.48* 3.36*   HGB 9.8* 9.2* 8.8*   HCT 32.1* 30.5* 28.9*    216 206      Recent Labs   Lab 07/03/22  0318 07/04/22  0330 07/05/22  0400 07/07/22  2346 07/09/22  0426 07/09/22  1102   INR 1.4* 1.3*  --   --   --   --    APTT 41.5* 39.1*   < > 44.8* 37.1* 41.6*    < > = values in this  interval not displayed.      Recent Labs     07/09/22  0426 07/09/22  1102   *  --    K 3.6 3.9   CO2 25  --      --    BUN 21*  --    CREATININE 1.6*  --      No results for input(s): CPK, CPKMB, MB, TROPONINI in the last 168 hours.   Recent Labs     07/07/22  0509 07/08/22  0429 07/09/22  0429   PH 7.392 7.374 7.378   PCO2 47.9* 44.0 48.1*   PO2 28* 27* 29*   HCO3 29.1* 25.6 28.3*   POCSATURATED 52* 48* 52*   BE 4 0 3       Electrolytes: Electrolytes replaced  Accuchecks: none    Gtts/LDAs:   eptifibatide 2 mcg/kg/min (07/09/22 1600)    heparin (porcine) in D5W 22 Units/kg/hr (07/09/22 1600)       Lines/Drains/Airways       Central Venous Catheter Line  Duration             Percutaneous Central Line Insertion/Assessment - Triple Lumen  07/02/22 1522 right internal jugular 7 days              Line  Duration                  VAD 03/08/18 1124 Left ventricular assist device HeartMate II 1584 days              Peripheral Intravenous Line  Duration                  Midline Catheter Insertion/Assessment  - Single Lumen 06/28/22 1027 Right cephalic vein (lateral side of arm) 20g x 8cm 11 days                    Skin/Wounds  Bathing/Skin Care: bath, complete (07/09/22 1300)  Wounds: No  Wound care consulted: No

## 2022-07-10 LAB
ALLENS TEST: ABNORMAL
ANION GAP SERPL CALC-SCNC: 9 MMOL/L (ref 8–16)
ANION GAP SERPL CALC-SCNC: 9 MMOL/L (ref 8–16)
APTT BLDCRRT: 52.2 SEC (ref 21–32)
BASOPHILS # BLD AUTO: 0.01 K/UL (ref 0–0.2)
BASOPHILS NFR BLD: 0.2 % (ref 0–1.9)
BUN SERPL-MCNC: 22 MG/DL (ref 6–20)
BUN SERPL-MCNC: 22 MG/DL (ref 6–20)
CALCIUM SERPL-MCNC: 9.3 MG/DL (ref 8.7–10.5)
CALCIUM SERPL-MCNC: 9.8 MG/DL (ref 8.7–10.5)
CHLORIDE SERPL-SCNC: 101 MMOL/L (ref 95–110)
CHLORIDE SERPL-SCNC: 102 MMOL/L (ref 95–110)
CO2 SERPL-SCNC: 24 MMOL/L (ref 23–29)
CO2 SERPL-SCNC: 26 MMOL/L (ref 23–29)
CREAT SERPL-MCNC: 1.7 MG/DL (ref 0.5–1.4)
CREAT SERPL-MCNC: 1.8 MG/DL (ref 0.5–1.4)
DELSYS: ABNORMAL
DIFFERENTIAL METHOD: ABNORMAL
EOSINOPHIL # BLD AUTO: 0.1 K/UL (ref 0–0.5)
EOSINOPHIL NFR BLD: 1.6 % (ref 0–8)
ERYTHROCYTE [DISTWIDTH] IN BLOOD BY AUTOMATED COUNT: 15 % (ref 11.5–14.5)
EST. GFR  (AFRICAN AMERICAN): 47.9 ML/MIN/1.73 M^2
EST. GFR  (AFRICAN AMERICAN): 51.3 ML/MIN/1.73 M^2
EST. GFR  (NON AFRICAN AMERICAN): 41.4 ML/MIN/1.73 M^2
EST. GFR  (NON AFRICAN AMERICAN): 44.4 ML/MIN/1.73 M^2
GLUCOSE SERPL-MCNC: 108 MG/DL (ref 70–110)
GLUCOSE SERPL-MCNC: 88 MG/DL (ref 70–110)
HCO3 UR-SCNC: 28.4 MMOL/L (ref 24–28)
HCT VFR BLD AUTO: 30 % (ref 40–54)
HGB BLD-MCNC: 9.1 G/DL (ref 14–18)
IMM GRANULOCYTES # BLD AUTO: 0.02 K/UL (ref 0–0.04)
IMM GRANULOCYTES NFR BLD AUTO: 0.4 % (ref 0–0.5)
LACTATE SERPL-SCNC: 1.2 MMOL/L (ref 0.5–2.2)
LDH SERPL L TO P-CCNC: 1086 U/L (ref 110–260)
LYMPHOCYTES # BLD AUTO: 0.8 K/UL (ref 1–4.8)
LYMPHOCYTES NFR BLD: 14.6 % (ref 18–48)
MAGNESIUM SERPL-MCNC: 1.9 MG/DL (ref 1.6–2.6)
MAGNESIUM SERPL-MCNC: 2 MG/DL (ref 1.6–2.6)
MCH RBC QN AUTO: 26.4 PG (ref 27–31)
MCHC RBC AUTO-ENTMCNC: 30.3 G/DL (ref 32–36)
MCV RBC AUTO: 87 FL (ref 82–98)
MODE: ABNORMAL
MONOCYTES # BLD AUTO: 1 K/UL (ref 0.3–1)
MONOCYTES NFR BLD: 17.4 % (ref 4–15)
NEUTROPHILS # BLD AUTO: 3.8 K/UL (ref 1.8–7.7)
NEUTROPHILS NFR BLD: 65.8 % (ref 38–73)
NRBC BLD-RTO: 0 /100 WBC
PCO2 BLDA: 47.2 MMHG (ref 35–45)
PH SMN: 7.39 [PH] (ref 7.35–7.45)
PLATELET # BLD AUTO: 205 K/UL (ref 150–450)
PMV BLD AUTO: 10.5 FL (ref 9.2–12.9)
PO2 BLDA: 28 MMHG (ref 40–60)
POC BE: 3 MMOL/L
POC SATURATED O2: 51 % (ref 95–100)
POC TCO2: 30 MMOL/L (ref 24–29)
POTASSIUM SERPL-SCNC: 3.6 MMOL/L (ref 3.5–5.1)
POTASSIUM SERPL-SCNC: 4.3 MMOL/L (ref 3.5–5.1)
RBC # BLD AUTO: 3.45 M/UL (ref 4.6–6.2)
SAMPLE: ABNORMAL
SITE: ABNORMAL
SODIUM SERPL-SCNC: 135 MMOL/L (ref 136–145)
SODIUM SERPL-SCNC: 136 MMOL/L (ref 136–145)
WBC # BLD AUTO: 5.69 K/UL (ref 3.9–12.7)

## 2022-07-10 PROCEDURE — 20000000 HC ICU ROOM

## 2022-07-10 PROCEDURE — 90792 PR PSYCHIATRIC DIAGNOSTIC EVALUATION W/MEDICAL SERVICES: ICD-10-PCS | Mod: ,,, | Performed by: PSYCHIATRY & NEUROLOGY

## 2022-07-10 PROCEDURE — 25000003 PHARM REV CODE 250: Performed by: STUDENT IN AN ORGANIZED HEALTH CARE EDUCATION/TRAINING PROGRAM

## 2022-07-10 PROCEDURE — 82803 BLOOD GASES ANY COMBINATION: CPT

## 2022-07-10 PROCEDURE — 83615 LACTATE (LD) (LDH) ENZYME: CPT | Performed by: STUDENT IN AN ORGANIZED HEALTH CARE EDUCATION/TRAINING PROGRAM

## 2022-07-10 PROCEDURE — 83605 ASSAY OF LACTIC ACID: CPT | Performed by: STUDENT IN AN ORGANIZED HEALTH CARE EDUCATION/TRAINING PROGRAM

## 2022-07-10 PROCEDURE — 25000003 PHARM REV CODE 250: Performed by: INTERNAL MEDICINE

## 2022-07-10 PROCEDURE — 99233 PR SUBSEQUENT HOSPITAL CARE,LEVL III: ICD-10-PCS | Mod: ,,, | Performed by: INTERNAL MEDICINE

## 2022-07-10 PROCEDURE — 63600175 PHARM REV CODE 636 W HCPCS: Performed by: INTERNAL MEDICINE

## 2022-07-10 PROCEDURE — 80048 BASIC METABOLIC PNL TOTAL CA: CPT | Mod: 91 | Performed by: INTERNAL MEDICINE

## 2022-07-10 PROCEDURE — 94761 N-INVAS EAR/PLS OXIMETRY MLT: CPT

## 2022-07-10 PROCEDURE — 99900035 HC TECH TIME PER 15 MIN (STAT)

## 2022-07-10 PROCEDURE — 99233 SBSQ HOSP IP/OBS HIGH 50: CPT | Mod: ,,, | Performed by: INTERNAL MEDICINE

## 2022-07-10 PROCEDURE — 93750 INTERROGATION VAD IN PERSON: CPT | Mod: ,,, | Performed by: INTERNAL MEDICINE

## 2022-07-10 PROCEDURE — 90792 PSYCH DIAG EVAL W/MED SRVCS: CPT | Mod: ,,, | Performed by: PSYCHIATRY & NEUROLOGY

## 2022-07-10 PROCEDURE — 83735 ASSAY OF MAGNESIUM: CPT | Performed by: INTERNAL MEDICINE

## 2022-07-10 PROCEDURE — 93750 PR INTERROGATE VENT ASSIST DEV, IN PERSON, W PHYSICIAN ANALYSIS: ICD-10-PCS | Mod: ,,, | Performed by: INTERNAL MEDICINE

## 2022-07-10 PROCEDURE — 27000248 HC VAD-ADDITIONAL DAY

## 2022-07-10 PROCEDURE — 85730 THROMBOPLASTIN TIME PARTIAL: CPT | Performed by: INTERNAL MEDICINE

## 2022-07-10 PROCEDURE — 85025 COMPLETE CBC W/AUTO DIFF WBC: CPT | Performed by: INTERNAL MEDICINE

## 2022-07-10 RX ORDER — TALC
6 POWDER (GRAM) TOPICAL NIGHTLY PRN
Status: DISCONTINUED | OUTPATIENT
Start: 2022-07-10 | End: 2022-07-13

## 2022-07-10 RX ORDER — POTASSIUM CHLORIDE 20 MEQ/1
40 TABLET, EXTENDED RELEASE ORAL ONCE
Status: COMPLETED | OUTPATIENT
Start: 2022-07-10 | End: 2022-07-10

## 2022-07-10 RX ORDER — LANOLIN ALCOHOL/MO/W.PET/CERES
400 CREAM (GRAM) TOPICAL ONCE
Status: COMPLETED | OUTPATIENT
Start: 2022-07-10 | End: 2022-07-10

## 2022-07-10 RX ADMIN — PANTOPRAZOLE SODIUM 40 MG: 40 TABLET, DELAYED RELEASE ORAL at 12:07

## 2022-07-10 RX ADMIN — ACETAMINOPHEN 650 MG: 325 TABLET ORAL at 06:07

## 2022-07-10 RX ADMIN — EPTIFIBATIDE 2 MCG/KG/MIN: 0.75 INJECTION INTRAVENOUS at 06:07

## 2022-07-10 RX ADMIN — POTASSIUM CHLORIDE 40 MEQ: 1500 TABLET, EXTENDED RELEASE ORAL at 06:07

## 2022-07-10 RX ADMIN — ASPIRIN 325 MG: 325 TABLET, COATED ORAL at 08:07

## 2022-07-10 RX ADMIN — BUSPIRONE HYDROCHLORIDE 5 MG: 5 TABLET ORAL at 08:07

## 2022-07-10 RX ADMIN — MEXILETINE HYDROCHLORIDE 250 MG: 250 CAPSULE ORAL at 09:07

## 2022-07-10 RX ADMIN — ALPRAZOLAM 1 MG: 0.5 TABLET ORAL at 07:07

## 2022-07-10 RX ADMIN — EPTIFIBATIDE 2 MCG/KG/MIN: 0.75 INJECTION INTRAVENOUS at 12:07

## 2022-07-10 RX ADMIN — LEVOTHYROXINE SODIUM 112 MCG: 112 TABLET ORAL at 06:07

## 2022-07-10 RX ADMIN — MEXILETINE HYDROCHLORIDE 250 MG: 250 CAPSULE ORAL at 02:07

## 2022-07-10 RX ADMIN — ZOLPIDEM TARTRATE 5 MG: 5 TABLET ORAL at 11:07

## 2022-07-10 RX ADMIN — AMLODIPINE BESYLATE 10 MG: 10 TABLET ORAL at 08:07

## 2022-07-10 RX ADMIN — ALLOPURINOL 100 MG: 100 TABLET ORAL at 08:07

## 2022-07-10 RX ADMIN — AMIODARONE HYDROCHLORIDE 400 MG: 200 TABLET ORAL at 08:07

## 2022-07-10 RX ADMIN — BUSPIRONE HYDROCHLORIDE 5 MG: 5 TABLET ORAL at 02:07

## 2022-07-10 RX ADMIN — MEXILETINE HYDROCHLORIDE 250 MG: 250 CAPSULE ORAL at 06:07

## 2022-07-10 RX ADMIN — BUSPIRONE HYDROCHLORIDE 5 MG: 5 TABLET ORAL at 09:07

## 2022-07-10 RX ADMIN — HEPARIN SODIUM 22 UNITS/KG/HR: 5000 INJECTION INTRAVENOUS; SUBCUTANEOUS at 06:07

## 2022-07-10 RX ADMIN — Medication 400 MG: at 06:07

## 2022-07-10 RX ADMIN — HEPARIN SODIUM 22 UNITS/KG/HR: 5000 INJECTION INTRAVENOUS; SUBCUTANEOUS at 09:07

## 2022-07-10 NOTE — CONSULTS
"[facility] - Card[facility]  Psychiatry  Consult Note    Patient Name: Tim Richards  MRN: [id number]   Code Status: Full Code  Admission Date: 6/27/2022  Hospital Length of Stay: 13 days  Attending Physician: Isaiah Santizo MD  Primary Care Provider: Deyanira Booth MD    Current Legal Status: Uncontested    Patient information was obtained from patient and primary team.   Inpatient consult to Psychiatry  Consult performed by: Leeann Lyle MD  Consult ordered by: Harry Canales MD        Subjective:     Principal Problem:Left ventricular assist device (LVAD) complication    Chief Complaint:  Anxiety and insomnia      HPI:   Tim Richards is a 56 y.o. male with a past psychiatric history of generalized anxiety disorder, adjustment disorder, and insomnia who presented to [facility] due to Left ventricular assist device (LVAD) complication. Psychiatry was consulted for "anxiety and sleep disturbance in the ICU".    Per Primary Team:  56-yo M with hx of Stage D HFrEF (NICMP, EF 10%, previously NYHA II), s/p  HM2 implanted in march/2018, SC ICD, VT on amiodarone and mexiletine and amiodarone induced hypothyroidism. Pt refers that he was in his usual state until 3-4 days ago when he visited a family member's house and had dietary indiscretions. Refers that since then he has had low appetite. Also states today he started to notice a dark urine and increasing SOB so he decided to come to the ED. He denies palpitaitons, ICD shocks, but refers some chest tightness.   He also refers chills and soft stools for the past 2 days.  Denied episodes of bleeding.     Per Psychiatry:  On interview with resident, patient was somewhat guarded as he had already talked with the attending psychiatrist who is his outpatient provider. He did complain of poor sleep, which he attributed to not being on his home medication and being woken up throughout the night by hospital staff. Mentioned some anxiety over his upcoming " surgery. Denied any depressed mood. Patient denied a history of anxiety and reported that he is unsure why he takes Xanax. He denied any past psychiatric hospitalizations and any other psychiatric diagnoses other than insomnia. Patient did mention that he follows with Dr. Krueger and that he plans to continue receiving outpatient care with him after discharge.     Psychiatric Review of Systems-is patient experiencing or having changes in  sleep: yes  appetite: no  weight: no  energy/anergy: no  interest/pleasure/anhedonia: no  somatic symptoms: no  libido: did not assess  anxiety/panic: yes  guilty/hopelessness: no  concentration: no  S.I.B.s/risky behavior: did not assess  any drugs: no  alcohol: no     Allergies:  Lisinopril and Hydralazine analogues    Past Medical/Surgical History:  Past Medical History:   Diagnosis Date    Anticoagulant long-term use     CHF (congestive heart failure)     Class 1 obesity due to excess calories with serious comorbidity and body mass index (BMI) of 31.0 to 31.9 in adult     Dilated cardiomyopathy 1/10/2018    Disorder of kidney and ureter     CKD    Encounter for blood transfusion     Gout     HTN (hypertension)     Hx of psychiatric care     ICD (implantable cardioverter-defibrillator) infection 7/1/2020    Psychiatric problem     Thyroid disease     Ventricular tachycardia (paroxysmal)      Past Surgical History:   Procedure Laterality Date    CARDIAC CATHETERIZATION  Dec. 2012    CARDIAC DEFIBRILLATOR PLACEMENT Left     CRRT-D    COLONOSCOPY N/A 3/6/2018    Procedure: COLONOSCOPY;  Surgeon: Alonso Bone MD;  Location: [facility] ([address] [place] [address]);  Service: Endoscopy;  Laterality: N/A;    COLONOSCOPY N/A 7/17/2019    Procedure: COLONOSCOPY;  Surgeon: Blane Valdez MD;  Location: [facility] ([address] [place] [address]);  Service: Endoscopy;  Laterality: N/A;    COLONOSCOPY N/A 7/18/2019    Procedure: COLONOSCOPY;  Surgeon: Blane Valdez MD;  Location: [facility] ([address] [place] [address]);  Service:  Endoscopy;  Laterality: N/A;    ESOPHAGOGASTRODUODENOSCOPY N/A 7/17/2019    Procedure: EGD (ESOPHAGOGASTRODUODENOSCOPY);  Surgeon: Blane Valdez MD;  Location: [facility] ENDO (2ND FLR);  Service: Endoscopy;  Laterality: N/A;    ESOPHAGOGASTRODUODENOSCOPY N/A 7/18/2019    Procedure: EGD (ESOPHAGOGASTRODUODENOSCOPY);  Surgeon: Blane Valdez MD;  Location: [facility] ENDO (2ND FLR);  Service: Endoscopy;  Laterality: N/A;    NONINVASIVE CARDIAC ELECTROPHYSIOLOGY STUDY N/A 10/18/2019    Procedure: CARDIAC ELECTROPHYSIOLOGY STUDY, NONINVASIVE;  Surgeon: Raz Wagner MD;  Location: [facility] EP LAB;  Service: Cardiology;  Laterality: N/A;  VT, DFTs, MDT CRTD in situ, LVAD, anes, MB, 3098    REPLACEMENT OF IMPLANTABLE CARDIOVERTER-DEFIBRILLATOR (ICD) GENERATOR N/A 3/9/2020    Procedure: REPLACEMENT, ICD GENERATOR;  Surgeon: Harry Yun MD;  Location: [facility] EP LAB;  Service: Cardiology;  Laterality: N/A;  VT, ICD Gen Change and Lead Revision, MDT, MAC, DM,3 Prep    REVISION OF IMPLANTABLE CARDIOVERTER-DEFIBRILLATOR (ICD) ELECTRODE LEAD PLACEMENT N/A 3/9/2020    Procedure: REVISION, INSERTION, ELECTRODE LEAD, ICD;  Surgeon: Harry Yun MD;  Location: [facility] EP LAB;  Service: Cardiology;  Laterality: N/A;  VT, ICD Gen Change and Lead Revision, MDT, MAC, DM,3 Prep    TREATMENT OF CARDIAC ARRHYTHMIA  10/18/2019    Procedure: Cardioversion or Defibrillation;  Surgeon: Raz Wagner MD;  Location: [facility] EP LAB;  Service: Cardiology;;       Past Psychiatric History:  Previous Medication Trials: yes   Previous Psychiatric Hospitalizations: no   Previous Suicide Attempts: no   History of Violence: unknown  Outpatient Psychiatrist: yes, Dr. Krueger     Social History:  Marital Status:   Children: 3   Employment Status/Info: on disability  Education:  college  Special Ed: no  Housing Status: with family   History of phys/sexual abuse: unknown  Access to gun: unknown    Substance Abuse History:  Recreational Drugs:   denied  Use of Alcohol: denied  Rehab History: unknown   Tobacco Use: no  Use of OTC: denied    Legal History:  Past Charges/Incarcerations: unknown   Pending charges: unknown     Family Psychiatric History:   unknown    Psychosocial Stressors: health  Functioning Relationships: good support system        Hospital Course: No notes on file         Patient History               Medical as of 7/10/2022       Past Medical History       Diagnosis Date Comments Source    Anticoagulant long-term use -- -- Provider    CHF (congestive heart failure) -- -- Provider    Class 1 obesity due to excess calories with serious comorbidity and body mass index (BMI) of 31.0 to 31.9 in adult -- -- Provider    Dilated cardiomyopathy 1/10/2018 -- Provider    Disorder of kidney and ureter -- CKD Provider    Encounter for blood transfusion -- -- Provider    Gout -- -- Provider    HTN (hypertension) -- -- Provider    Hx of psychiatric care -- -- Provider    ICD (implantable cardioverter-defibrillator) infection 7/1/2020 -- Provider    Psychiatric problem -- -- Provider    Thyroid disease -- -- Provider    Ventricular tachycardia (paroxysmal) -- -- Provider              Pertinent Negatives       Diagnosis Date Noted Comments Source    Arthritis 07/01/2020 -- Provider    Asthma 07/01/2020 -- Provider    Cancer 07/01/2020 -- Provider    COPD (chronic obstructive pulmonary disease) 07/01/2020 -- Provider    Diabetes mellitus 07/07/2018 -- Provider    Headache 04/28/2021 -- Provider    History of psychiatric hospitalization 04/28/2021 -- Provider    Seizures 07/01/2020 -- Provider    Stroke 07/01/2020 -- Provider    Suicide attempt 04/28/2021 -- Provider    Therapy 04/28/2021 -- Provider    Transfusion reaction 07/01/2020 -- Provider                          Surgical as of 7/10/2022       Past Surgical History       Procedure Laterality Date Comments Source    CARDIAC CATHETERIZATION -- Dec. 2012 -- Provider    CARDIAC DEFIBRILLATOR PLACEMENT  Left -- CRRT-D Provider    COLONOSCOPY N/A 3/6/2018 Procedure: COLONOSCOPY;  Surgeon: Alonso Bone MD;  Location: [facility] ENDO (2ND FLR);  Service: Endoscopy;  Laterality: N/A; Provider    ESOPHAGOGASTRODUODENOSCOPY N/A 7/17/2019 Procedure: EGD (ESOPHAGOGASTRODUODENOSCOPY);  Surgeon: Blane Valdez MD;  Location: [facility] ENDO (2ND FLR);  Service: Endoscopy;  Laterality: N/A; Provider    COLONOSCOPY N/A 7/17/2019 Procedure: COLONOSCOPY;  Surgeon: Blane Valdez MD;  Location: [facility] ENDO (2ND FLR);  Service: Endoscopy;  Laterality: N/A; Provider    ESOPHAGOGASTRODUODENOSCOPY N/A 7/18/2019 Procedure: EGD (ESOPHAGOGASTRODUODENOSCOPY);  Surgeon: Blane Valdez MD;  Location: [facility] ENDO (2ND FLR);  Service: Endoscopy;  Laterality: N/A; Provider    COLONOSCOPY N/A 7/18/2019 Procedure: COLONOSCOPY;  Surgeon: Blane Valdez MD;  Location: [facility] ENDO (2ND FLR);  Service: Endoscopy;  Laterality: N/A; Provider    NONINVASIVE CARDIAC ELECTROPHYSIOLOGY STUDY N/A 10/18/2019 Procedure: CARDIAC ELECTROPHYSIOLOGY STUDY, NONINVASIVE;  Surgeon: Raz Wagner MD;  Location: [facility] EP LAB;  Service: Cardiology;  Laterality: N/A;  VT, DFTs, MDT CRTD in situ, LVAD, anes, MB, 3098 Provider    TREATMENT OF CARDIAC ARRHYTHMIA -- 10/18/2019 Procedure: Cardioversion or Defibrillation;  Surgeon: Raz Wagner MD;  Location: [facility] EP LAB;  Service: Cardiology;; Provider    REVISION OF IMPLANTABLE CARDIOVERTER-DEFIBRILLATOR (ICD) ELECTRODE LEAD PLACEMENT N/A 3/9/2020 Procedure: REVISION, INSERTION, ELECTRODE LEAD, ICD;  Surgeon: Harry Yun MD;  Location: [facility] EP LAB;  Service: Cardiology;  Laterality: N/A;  VT, ICD Gen Change and Lead Revision, MDT, MAC, DM,3 Prep Provider    REPLACEMENT OF IMPLANTABLE CARDIOVERTER-DEFIBRILLATOR (ICD) GENERATOR N/A 3/9/2020 Procedure: REPLACEMENT, ICD GENERATOR;  Surgeon: Harry Yun MD;  Location: [facility] EP LAB;  Service: Cardiology;  Laterality: N/A;  VT, ICD Gen Change and Lead Revision, MDT, MAC, DM,3 Prep Provider                           Family as of 7/10/2022       Problem Relation Name Age of Onset Comments Source    Hypertension Father -- -- -- Provider    Diabetes Father -- -- -- Provider    Coronary artery disease Father -- -- -- Provider    Heart disease Father -- -- CHF Provider    No Known Problems Mother -- -- -- Provider    Cancer Sister -- 54 breast CA Provider    No Known Problems Brother -- -- -- Provider    Anxiety disorder Neg Hx -- -- -- Provider    Depression Neg Hx -- -- -- Provider    Dementia Neg Hx -- -- -- Provider    Bipolar disorder Neg Hx -- -- -- Provider    Suicide Neg Hx -- -- -- Provider                  Tobacco Use as of 7/10/2022       Smoking Status Smoking Start Date Smoking Quit Date Packs/Day Years Used    Former Smoker -- 1/12/2018 1.00 31.00      Types Comments Smokeless Tobacco Status Smokeless Tobacco Quit Date Source     Cigarettes pt is quiting on his own - pt stated not qualified for program; 2/16 pt  quit on his own Never Used -- Provider                  Alcohol Use as of 7/10/2022       Alcohol Use Drinks/Week Alcohol/Week Comments Source    No 0 Standard drinks or equivalent 0.0 standard drinks quit Provider                  Drug Use as of 7/10/2022       Drug Use Types Frequency Comments Source    No -- -- -- Provider                  Sexual Activity as of 7/10/2022       Sexually Active Birth Control Partners Comments Source    Yes None Female 10/5/17  with same partner 7 years  Provider                  Activities of Daily Living as of 7/10/2022       Activities of Daily Living Question Response Comments Source    Patient feels they ought to cut down on drinking/drug use Not Asked -- Provider    Patient annoyed by others criticizing their drinking/drug use Not Asked -- Provider    Patient has felt bad or guilty about drinking/drug use Not Asked -- Provider    Patient has had a drink/used drugs as an eye opener in the AM Not Asked -- Provider                  Social  Documentation as of 7/10/2022    Disabled    Source: Provider               Occupational as of 7/10/2022       Occupation Employer Comments Source    -- [employer]  -- Provider                  Socioeconomic as of 7/10/2022       Marital Status Spouse Name Number of Children Years Education Education Level Preferred Language Ethnicity Race Source     -- 3 -- -- English Not  or /a Black or  Provider                  Pertinent History       Question Response Comments    Lives with family wife and step son    Place in Birth Order -- --    Lives in home --    Number of Siblings -- --    Raised by -- grew up in [place], [place]    Legal Involvement none --    Childhood Trauma -- --    Criminal History of none --    Financial Status disabled --    Highest Level of Education unfinished college [employer]    Does patient have access to a firearm? Yes --     Service No --    Primary Leisure Activity other TV, family    Spirituality actively participates in organized [employer]          Past Medical History:   Diagnosis Date    Anticoagulant long-term use     CHF (congestive heart failure)     Class 1 obesity due to excess calories with serious comorbidity and body mass index (BMI) of 31.0 to 31.9 in adult     Dilated cardiomyopathy 1/10/2018    Disorder of kidney and ureter     CKD    Encounter for blood transfusion     Gout     HTN (hypertension)     Hx of psychiatric care     ICD (implantable cardioverter-defibrillator) infection 7/1/2020    Psychiatric problem     Thyroid disease     Ventricular tachycardia (paroxysmal)      Past Surgical History:   Procedure Laterality Date    CARDIAC CATHETERIZATION  Dec. 2012    CARDIAC DEFIBRILLATOR PLACEMENT Left     CRRT-D    COLONOSCOPY N/A 3/6/2018    Procedure: COLONOSCOPY;  Surgeon: Alonso Bone MD;  Location: [facility] ([address] [place] [address]);  Service: Endoscopy;  Laterality: N/A;    COLONOSCOPY N/A 7/17/2019     Procedure: COLONOSCOPY;  Surgeon: Blane Valdez MD;  Location: [facility] ENDO (2ND FLR);  Service: Endoscopy;  Laterality: N/A;    COLONOSCOPY N/A 7/18/2019    Procedure: COLONOSCOPY;  Surgeon: Blane Valdez MD;  Location: [facility] ENDO (2ND FLR);  Service: Endoscopy;  Laterality: N/A;    ESOPHAGOGASTRODUODENOSCOPY N/A 7/17/2019    Procedure: EGD (ESOPHAGOGASTRODUODENOSCOPY);  Surgeon: Blane Valdez MD;  Location: [facility] ENDO (2ND FLR);  Service: Endoscopy;  Laterality: N/A;    ESOPHAGOGASTRODUODENOSCOPY N/A 7/18/2019    Procedure: EGD (ESOPHAGOGASTRODUODENOSCOPY);  Surgeon: Blane Valdez MD;  Location: [facility] ENDO (2ND FLR);  Service: Endoscopy;  Laterality: N/A;    NONINVASIVE CARDIAC ELECTROPHYSIOLOGY STUDY N/A 10/18/2019    Procedure: CARDIAC ELECTROPHYSIOLOGY STUDY, NONINVASIVE;  Surgeon: Raz Wagner MD;  Location: [facility] EP LAB;  Service: Cardiology;  Laterality: N/A;  VT, DFTs, MDT CRTD in situ, LVAD, anes, MB, 3098    REPLACEMENT OF IMPLANTABLE CARDIOVERTER-DEFIBRILLATOR (ICD) GENERATOR N/A 3/9/2020    Procedure: REPLACEMENT, ICD GENERATOR;  Surgeon: Harry Yun MD;  Location: [facility] EP LAB;  Service: Cardiology;  Laterality: N/A;  VT, ICD Gen Change and Lead Revision, MDT, MAC, DM,3 Prep    REVISION OF IMPLANTABLE CARDIOVERTER-DEFIBRILLATOR (ICD) ELECTRODE LEAD PLACEMENT N/A 3/9/2020    Procedure: REVISION, INSERTION, ELECTRODE LEAD, ICD;  Surgeon: Harry Yun MD;  Location: [facility] EP LAB;  Service: Cardiology;  Laterality: N/A;  VT, ICD Gen Change and Lead Revision, MDT, MAC, DM,3 Prep    TREATMENT OF CARDIAC ARRHYTHMIA  10/18/2019    Procedure: Cardioversion or Defibrillation;  Surgeon: Raz Wagner MD;  Location: [facility] EP LAB;  Service: Cardiology;;     Family History       Problem Relation (Age of Onset)    Cancer Sister (54)    Coronary artery disease Father    Diabetes Father    Heart disease Father    Hypertension Father    No Known Problems Mother, Brother          Tobacco Use    Smoking status:  Former Smoker     Packs/day: 1.00     Years: 31.00     Pack years: 31.00     Types: Cigarettes     Quit date: 2018     Years since quittin.4    Smokeless tobacco: Never Used    Tobacco comment: pt is quiting on his own - pt stated not qualified for program;  pt  quit on his own   Substance and Sexual Activity    Alcohol use: No     Alcohol/week: 0.0 standard drinks     Comment: quit    Drug use: No    Sexual activity: Yes     Partners: Female     Birth control/protection: None     Comment: 10/5/17  with same partner 7 years      Review of patient's allergies indicates:   Allergen Reactions    Lisinopril Anaphylaxis    Hydralazine analogues      Chronic constipation, impotence, dizziness       No current facility-administered medications on file prior to encounter.     Current Outpatient Medications on File Prior to Encounter   Medication Sig    allopurinoL (ZYLOPRIM) 100 MG tablet TAKE 1 TABLET (100 MG) BY MOUTH NIGHTLY.    ALPRAZolam (XANAX) 1 MG tablet Take 1 tablet (1 mg total) by mouth daily as needed for Anxiety. Bid prn    amiodarone (PACERONE) 200 MG Tab Take 2 tablets (400 mg total) by mouth once daily.    amLODIPine (NORVASC) 10 MG tablet TAKE 1 TABLET BY MOUTH EVERY DAY    aspirin (ECOTRIN) 81 MG EC tablet Take 1 tablet (81 mg total) by mouth once daily.    busPIRone (BUSPAR) 5 MG Tab Take 1 tablet (5 mg total) by mouth 3 (three) times daily.    levothyroxine (SYNTHROID) 112 MCG tablet Take 1 tablet (112 mcg total) by mouth before breakfast.    mexiletine (MEXITIL) 250 MG Cap Take 1 capsule (250 mg total) by mouth every 8 (eight) hours.    pantoprazole (PROTONIX) 40 MG tablet TAKE 1 TABLET (40 MG TOTAL) BY MOUTH DAILY WITH LUNCH.    warfarin (COUMADIN) 5 MG tablet TAKE 7.5 MG ORALLY DAILY EVERY  AND THURSDAY, TAKE 5 MG ORALLY DAILY ON OTHER DAYS    zolpidem (AMBIEN CR) 12.5 MG CR tablet Take 1 tablet (12.5 mg total) by mouth nightly as needed for Insomnia.     "[DISCONTINUED] ferrous gluconate (FERGON) 324 MG tablet TAKE 1 TABLET (324 MG TOTAL) BY MOUTH WITH LUNCH.    [DISCONTINUED] sildenafiL (VIAGRA) 100 MG tablet Take 1 tablet (100 mg total) by mouth daily as needed for Erectile Dysfunction.    [DISCONTINUED] torsemide (DEMADEX) 20 MG Tab Take 1 tablet (20 mg total) by mouth once daily.     Psychotherapeutics (From admission, onward)                Start     Stop Route Frequency Ordered    06/27/22 2100  busPIRone tablet 5 mg         -- Oral 3 times daily 06/27/22 1514    06/27/22 1603  zolpidem tablet 5 mg         -- Oral Nightly PRN 06/27/22 1514    06/27/22 1600  ALPRAZolam tablet 1 mg         -- Oral Daily PRN 06/27/22 1514          Review of Systems  - patient denied other complaints at this time     Strengths and Liabilities: Strength: Patient is expressive/articulate., Liability: Patient has poor health.    Objective:     Vital Signs (Most Recent):  Temp: 98.4 °F (36.9 °C) (07/10/22 1101)  Pulse: 76 (07/10/22 1501)  Resp: (!) 22 (07/10/22 1501)  BP: (!) 86/0 (07/10/22 1400)  SpO2: 95 % (07/10/22 0743)   Vital Signs (24h Range):  Temp:  [98.2 °F (36.8 °C)-98.8 °F (37.1 °C)] 98.4 °F (36.9 °C)  Pulse:  [76-92] 76  Resp:  [22-51] 22  SpO2:  [94 %-97 %] 95 %  BP: ()/(0-73) 86/0     Height: 6' 1" (185.4 cm)  Weight: 107.6 kg (237 lb 3.4 oz)  Body mass index is 31.3 kg/m².      Intake/Output Summary (Last 24 hours) at 7/10/2022 1521  Last data filed at 7/10/2022 1501  Gross per 24 hour   Intake 1301.68 ml   Output 2100 ml   Net -798.32 ml       Physical Exam  Vitals and nursing note reviewed.   HENT:      Head: Normocephalic.   Eyes:      Extraocular Movements: Extraocular movements intact.   Pulmonary:      Effort: Pulmonary effort is normal.   Musculoskeletal:      Cervical back: Normal range of motion.   Skin:     General: Skin is dry.   Neurological:      Mental Status: He is alert and oriented to person, place, and time. Mental status is at baseline. " "  Psychiatric:      Comments: Mental Status Exam:  Appearance: unremarkable, age appropriate  Behavior/Cooperation: normal but somewhat guarded  Speech: normal tone, normal rate, normal volume  Mood: "frustrated"   Affect: normal  Thought Process: normal and logical  Thought Content: normal, no SI/HI/AVH reported  Orientation: grossly intact  Memory: Grossly intact  Attention Span/Concentration: Normal  Insight: fair  Judgment: fair               Assessment/Plan:     ASSESSMENT     Tim Richards is a 56 y.o. male with a past psychiatric history of generalized anxiety disorder, adjustment disorder, and insomnia who presented to [facility] due to Left ventricular assist device (LVAD) complication. Psychiatry was consulted for "anxiety and sleep disturbance in the ICU".      IMPRESSION  Insomnia   Adjustment Disorder with anxiety     RECOMMENDATION(S)      - Replace current Ambien order with patient's home medication of Ambien CR 12.5 nightly PRN insomnia.    - Continue buspirone 5 mg TID and alprazolam 1 mg daily PRN anxiety   - Please limit nighttime interruptions as much as possible.   - Patient does not meet criteria for PEC or inpatient psychiatric admission at this time. Patient is not currently an imminent danger to self or others and is not gravely disabled due to a psychiatric illness.       Leeann Lyle MD   [facility], PGY-2   [facility]  "

## 2022-07-10 NOTE — PROGRESS NOTES
07/10/2022  Estephania Martinez    Current provider:  Isaiah Santizo MD    Device interrogation:  TXP LVAD INTERROGATIONS 7/10/2022 7/10/2022 7/10/2022 7/10/2022 7/10/2022 7/10/2022 7/10/2022   Type HeartMate II HeartMate II HeartMate II HeartMate II HeartMate II HeartMate II HeartMate II   Flow 8.5 8.8 9.0 8.7 7.9 6.9 6.5   Speed 9800 9800 9800 9800 9800 9800 9800   PI 2.4 2.5 2.4 2.6 2.4 2.7 2.7   Power (Jackson) 8.2 8.5 8.4 8.3 8.1 7.3 7   LSL - - - 9400 9400 9400 9400   Pulsatility Intermittent pulse Intermittent pulse Intermittent pulse Intermittent pulse Intermittent pulse Intermittent pulse Intermittent pulse          Rounded on Tim Richards to ensure all mechanical assist device settings (IABP or VAD) were appropriate and all parameters were within limits.  I was able to ensure all back up equipment was present, the staff had no issues, and the Perfusion Department daily rounding was complete.      For implantable VADs: Interrogation of Ventricular assist device was performed with analysis of device parameters and review of device function. I have personally reviewed the interrogation findings and agree with findings as stated.     In emergency, the nursing units have been notified to contact the perfusion department either by:  Calling [phone] from 630am to 4pm Mon thru Fri, utilizing the On-Call Finder functionality of Epic and searching for Perfusion, or by contacting the hospital  from 4pm to 630am and on weekends and asking to speak with the perfusionist on call.    9:25 AM

## 2022-07-10 NOTE — HPI
Tim Richards is a 56 y.o. male with a past psychiatric history of generalized anxiety disorder, adjustment disorder, and insomnia who presented to [facility] due to Left ventricular assist device (LVAD) complication. Psychiatry was consulted for depression.    Per Primary Team:  56-yo M with hx of Stage D HFrEF (NICMP, EF 10%, previously NYHA II), s/p  HM2 implanted in march/2018, SC ICD, VT on amiodarone and mexiletine and amiodarone induced hypothyroidism. Pt refers that he was in his usual state until 3-4 days ago when he visited a family member's house and had dietary indiscretions. Refers that since then he has had low appetite. Also states today he started to notice a dark urine and increasing SOB so he decided to come to the ED. He denies palpitaitons, ICD shocks, but refers some chest tightness.   He also refers chills and soft stools for the past 2 days.  Denied episodes of bleeding.     Per 7/11 Psychiatry Consult:  On interview with resident, patient was somewhat guarded as he had already talked with the attending psychiatrist who is his outpatient provider. He did complain of poor sleep, which he attributed to not being on his home medication and being woken up throughout the night by hospital staff. Mentioned some anxiety over his upcoming surgery. Denied any depressed mood. Patient denied a history of anxiety and reported that he is unsure why he takes Xanax. He denied any past psychiatric hospitalizations and any other psychiatric diagnoses other than insomnia. Patient did mention that he follows with Dr. Krueger and that he plans to continue receiving outpatient care with him after discharge.     Per 8/13 Psychiatry Consult:  Patient seen resting in bed comfortably, states that he has been feeling depressed with being stuck in bed 24/7 as he has been hospitalized since 6/27. Voices that he has some interest in starting on some medications for depressed mood at this time. Denies being on any  psychiatric medications for mood besides some xanax by Dr. Krueger. He has been feeling disappointed after he was unable to pass the barium swallow. Denies SI, HI, AVH.    Psychiatric Review of Systems-is patient experiencing or having changes in  sleep: yes  appetite: no  weight: no  energy/anergy: yes  interest/pleasure/anhedonia: no  somatic symptoms: no  libido: did not assess  anxiety/panic: yes  guilty/hopelessness: yes  concentration: no  S.I.B.s/risky behavior: no  any drugs: no  alcohol: no     Allergies:  Lisinopril and Hydralazine analogues    Past Medical/Surgical History:  Past Medical History:   Diagnosis Date    Anticoagulant long-term use     CHF (congestive heart failure)     Class 1 obesity due to excess calories with serious comorbidity and body mass index (BMI) of 31.0 to 31.9 in adult     Dilated cardiomyopathy 1/10/2018    Disorder of kidney and ureter     CKD    Encounter for blood transfusion     Gout     HTN (hypertension)     Hx of psychiatric care     ICD (implantable cardioverter-defibrillator) infection 7/1/2020    Psychiatric problem     Thyroid disease     Ventricular tachycardia (paroxysmal)      Past Surgical History:   Procedure Laterality Date    CARDIAC CATHETERIZATION  Dec. 2012    CARDIAC DEFIBRILLATOR PLACEMENT Left     CRRT-D    COLONOSCOPY N/A 3/6/2018    Procedure: COLONOSCOPY;  Surgeon: Alonso Bone MD;  Location: [address]);  Service: Endoscopy;  Laterality: N/A;    COLONOSCOPY N/A 7/17/2019    Procedure: COLONOSCOPY;  Surgeon: Blane Valdez MD;  Location: [address]);  Service: Endoscopy;  Laterality: N/A;    COLONOSCOPY N/A 7/18/2019    Procedure: COLONOSCOPY;  Surgeon: Blane Valdez MD;  Location: [address]);  Service: Endoscopy;  Laterality: N/A;    ESOPHAGOGASTRODUODENOSCOPY N/A 7/17/2019    Procedure: EGD (ESOPHAGOGASTRODUODENOSCOPY);  Surgeon: Blane Valdez MD;  Location: [address]);  Service: Endoscopy;  Laterality: N/A;     ESOPHAGOGASTRODUODENOSCOPY N/A 7/18/2019    Procedure: EGD (ESOPHAGOGASTRODUODENOSCOPY);  Surgeon: Blane Valdez MD;  Location: [facility] ([address] [place] [address]);  Service: Endoscopy;  Laterality: N/A;    NONINVASIVE CARDIAC ELECTROPHYSIOLOGY STUDY N/A 10/18/2019    Procedure: CARDIAC ELECTROPHYSIOLOGY STUDY, NONINVASIVE;  Surgeon: Raz Wagner MD;  Location: [facility];  Service: Cardiology;  Laterality: N/A;  VT, DFTs, MDT CRTD in situ, LVAD, anes, MB, 3098    REPLACEMENT OF IMPLANTABLE CARDIOVERTER-DEFIBRILLATOR (ICD) GENERATOR N/A 3/9/2020    Procedure: REPLACEMENT, ICD GENERATOR;  Surgeon: Harry Yun MD;  Location: [facility];  Service: Cardiology;  Laterality: N/A;  VT, ICD Gen Change and Lead Revision, MDT, MAC, DM,3 Prep    REVISION OF IMPLANTABLE CARDIOVERTER-DEFIBRILLATOR (ICD) ELECTRODE LEAD PLACEMENT N/A 3/9/2020    Procedure: REVISION, INSERTION, ELECTRODE LEAD, ICD;  Surgeon: Harry Yun MD;  Location: [facility];  Service: Cardiology;  Laterality: N/A;  VT, ICD Gen Change and Lead Revision, MDT, MAC, DM,3 Prep    TREATMENT OF CARDIAC ARRHYTHMIA  10/18/2019    Procedure: Cardioversion or Defibrillation;  Surgeon: Raz Wagner MD;  Location: [facility];  Service: Cardiology;;       Past Psychiatric History:  Previous Medication Trials: yes, xanax, ambien  Previous Psychiatric Hospitalizations: no   Previous Suicide Attempts: no   History of Violence: unknown  Outpatient Psychiatrist: yes, Dr. Krueger     Social History:  Marital Status:   Children: 3   Employment Status/Info: on disability  Education:  college  Special Ed: no  Housing Status: with family   History of phys/sexual abuse: unknown  Access to gun: no    Substance Abuse History:  Recreational Drugs:  denied  Use of Alcohol: denied  Rehab History: unknown   Tobacco Use: no  Use of OTC: denied    Legal History:  Past Charges/Incarcerations: unknown   Pending charges: unknown     Family Psychiatric History:    unknown    Psychosocial Stressors: health  Functioning Relationships: good support system

## 2022-07-10 NOTE — ASSESSMENT & PLAN NOTE
Procedure: Device Interrogation Including analysis of device parameters  Current Settings: Ventricular Assist Device  Review of device function is stable    TXP LVAD INTERROGATIONS 7/10/2022 7/10/2022 7/10/2022 7/10/2022 7/10/2022 7/10/2022 7/10/2022   Type HeartMate II HeartMate II HeartMate II HeartMate II HeartMate II HeartMate II HeartMate II   Flow 7.4 7.8 8.5 8.8 9.0 8.7 7.9   Speed 9800 9800 9800 9800 9800 9800 9800   PI 3.0 2.2. 2.4 2.5 2.4 2.6 2.4   Power (Jackson) 7.6 8.0 8.2 8.5 8.4 8.3 8.1   LSL - - - - - 9400 9400   Pulsatility Intermittent pulse Intermittent pulse Intermittent pulse Intermittent pulse Intermittent pulse Intermittent pulse Intermittent pulse

## 2022-07-10 NOTE — ASSESSMENT & PLAN NOTE
-Zolpidem night prn  -Alprazolam prn anxiety  -Consulting psych for additional assistance  -Starting Melatonin 6 mg QHS PRN  -Doppler pulses only (no BP cuff) at night to encourage sleep  -Exchanged patient's bed

## 2022-07-10 NOTE — PLAN OF CARE
CICU DAILY GOALS       A: Awake    RASS: Goal - RASS Goal: 0-->alert and calm  Actual - RASS (Newman Agitation-Sedation Scale): 0-->alert and calm   Restraint necessity:    B: Breath   SBT: Not intubated   C: Coordinate A & B, analgesics/sedatives   Pain: managed    SAT: Not intubated  D: Delirium   CAM-ICU: Overall CAM-ICU: Negative  E: Early(intubated/ Progressive (non-intubated) Mobility   MOVE Screen: Pass   Activity: Activity Management: Ankle pumps - L1, Arm raise - L1  FAS: Feeding/Nutrition   Diet order: Diet/Nutrition Received: 2 gram sodium, low saturated fat/low cholesterol,   Fluid restriction: Fluid Requirement: 1500cc FR  T: Thrombus   DVT prophylaxis: VTE Required Core Measure: Pharmacological prophylaxis initiated/maintained  H: HOB Elevation   Head of Bed (HOB) Positioning: HOB at 20-30 degrees  U: Ulcer Prophylaxis   GI: yes  G: Glucose control   managed    S: Skin   Bundle compliance: yes   Bathing/Skin Care: bath, complete Date: AM bath  B: Bowel Function   no issues   I: Indwelling Catheters   [place] necessity:     CVC necessity: Yes   IPAD offered: No  D: De-escalation Antibx   No  Plan for the day   Monitor CVP 10, 13, 13, SVO2 51, monitor VS, pt refusing hourly BP, no VAD alarms.  Family/Goals of care/Code Status   Code Status: Full Code     No acute events throughout day, VS and assessment per flow sheet, patient progressing towards goals as tolerated, plan of care reviewed with Tim Richards and family, all concerns addressed, will continue to monitor

## 2022-07-10 NOTE — SUBJECTIVE & OBJECTIVE
Interval History:   Power went up to 11 and was sustaining to 9 for an extended period of time  The patient has been anxious and not sleeping well.  He had concerns regarding his medical management - Extensive discussion occurred regarding the underlying pathophysiology and need for continued ICU level support as well as the current medical treatment  His bed has not been supporting him - Bed replaced  His urine is yellow.    CVP: 11-12  SVO2: 51  CO: 7.02  CI: 3.04  SCR: 581      Continuous Infusions:   eptifibatide 2 mcg/kg/min (07/10/22 1101)    heparin (porcine) in D5W 22 Units/kg/hr (07/10/22 1101)     Scheduled Meds:   allopurinoL  100 mg Oral Daily    amiodarone  400 mg Oral Daily    amLODIPine  10 mg Oral Daily    aspirin  325 mg Oral Daily    busPIRone  5 mg Oral TID    levothyroxine  112 mcg Oral Before breakfast    LIDOcaine  1 patch Transdermal Q24H    mexiletine  250 mg Oral Q8H    pantoprazole  40 mg Oral Daily with lunch     PRN Meds:acetaminophen, ALPRAZolam, famotidine, sodium chloride 0.9%, zolpidem    Review of patient's allergies indicates:   Allergen Reactions    Lisinopril Anaphylaxis    Hydralazine analogues      Chronic constipation, impotence, dizziness     Objective:     Vital Signs (Most Recent):  Temp: 98.6 °F (37 °C) (07/10/22 0701)  Pulse: 84 (07/10/22 1101)  Resp: (!) 23 (07/10/22 1101)  BP: (!) 88/0 (07/10/22 1101)  SpO2: 95 % (07/10/22 0743)   Vital Signs (24h Range):  Temp:  [98.2 °F (36.8 °C)-98.8 °F (37.1 °C)] 98.6 °F (37 °C)  Pulse:  [79-92] 84  Resp:  [23-51] 23  SpO2:  [94 %-97 %] 95 %  BP: ()/(0-73) 88/0  Arterial Line BP: (81)/(74) 81/74     Patient Vitals for the past 72 hrs (Last 3 readings):   Weight   07/10/22 0400 107.6 kg (237 lb 3.4 oz)   07/09/22 2202 108.3 kg (238 lb 12.1 oz)   07/08/22 1000 102.1 kg (225 lb)     Body mass index is 31.3 kg/m².      Intake/Output Summary (Last 24 hours) at 7/10/2022 1144  Last data filed at 7/10/2022 1101  Gross per 24 hour    Intake 1241.76 ml   Output 2300 ml   Net -1058.24 ml       Hemodynamic Parameters:       Telemetry: No events    Physical Exam  Constitutional:       General: He is not in acute distress.     Appearance: He is obese.   HENT:      Head: Normocephalic.      Mouth/Throat:      Mouth: Mucous membranes are moist.   Eyes:      Extraocular Movements: Extraocular movements intact.   Cardiovascular:      Rate and Rhythm: Normal rate and regular rhythm.      Comments: VAD hum appreciated  Pulses detected via doppler  Pulmonary:      Effort: No respiratory distress.      Breath sounds: Normal breath sounds.      Comments: Coarse breath sounds at the bases bilaterally  Abdominal:      General: Bowel sounds are normal. There is no distension.      Palpations: Abdomen is soft.      Tenderness: There is no abdominal tenderness.   Musculoskeletal:      Cervical back: Neck supple.   Skin:     General: Skin is warm.   Neurological:      General: No focal deficit present.      Mental Status: He is oriented to person, place, and time.   Psychiatric:         Mood and Affect: Mood normal.         Behavior: Behavior normal.       Significant Labs:  CBC:  Recent Labs   Lab 07/08/22  0348 07/09/22  0843 07/10/22  0210   WBC 7.25 5.59 5.69   RBC 3.48* 3.36* 3.45*   HGB 9.2* 8.8* 9.1*   HCT 30.5* 28.9* 30.0*    206 205   MCV 88 86 87   MCH 26.4* 26.2* 26.4*   MCHC 30.2* 30.4* 30.3*     BNP:  No results for input(s): BNP in the last 168 hours.    Invalid input(s): BNPTRIAGELBLO  CMP:  Recent Labs   Lab 07/04/22  0330 07/04/22  0825 07/09/22  0426 07/09/22  1102 07/09/22  1727 07/10/22  0210      < > 91  --  114* 88   CALCIUM 10.0   < > 9.8  --  9.0 9.8   ALBUMIN 3.2*  --   --   --   --   --    PROT 8.1  --   --   --   --   --    *   < > 135*  --  134* 136   K 3.4*   < > 3.6 3.9 4.0 3.6   CO2 28   < > 25  --  21* 26   CL 94*   < > 100  --  100 101   BUN 37*   < > 21*  --  23* 22*   CREATININE 2.3*   < > 1.6*  --  1.9* 1.8*    ALKPHOS 102  --   --   --   --   --    ALT 57*  --   --   --   --   --    *  --   --   --   --   --    BILITOT 3.0*  --   --   --   --   --     < > = values in this interval not displayed.      Coagulation:   Recent Labs   Lab 07/04/22  0330 07/05/22  0400 07/09/22  1102 07/09/22  1727 07/10/22  0210   INR 1.3*  --   --   --   --    APTT 39.1*   < > 41.6* 39.7* 52.2*    < > = values in this interval not displayed.     LDH:  Recent Labs   Lab 07/07/22  1814 07/08/22  0348 07/09/22  0426 07/10/22  0210   LDH 1,355* 1,384* 1,216* 1,086*     Microbiology:  Microbiology Results (last 7 days)       ** No results found for the last 168 hours. **            I have reviewed all pertinent labs within the past 24 hours.    Estimated Creatinine Clearance: 59.7 mL/min (A) (based on SCr of 1.8 mg/dL (H)).    Diagnostic Results:  I have reviewed and interpreted all pertinent imaging results/findings within the past 24 hours.

## 2022-07-10 NOTE — PROGRESS NOTES
[facility] - Cardiac Intensive Care  Heart Transplant  Progress Note    Patient Name: Tim Richards  MRN: [id number]  Admission Date: 6/27/2022  Hospital Length of Stay: 13 days  Attending Physician: Isaiah Santizo MD  Primary Care Provider: Deyanira Booth MD  Principal Problem:Left ventricular assist device (LVAD) complication    Subjective:     Interval History:   Power went up to 11 and was sustaining to 9 for an extended period of time  The patient has been anxious and not sleeping well.  He had concerns regarding his medical management - Extensive discussion occurred regarding the underlying pathophysiology and need for continued ICU level support as well as the current medical treatment  His bed has not been supporting him - Bed replaced  His urine is yellow.    CVP: 11-12  SVO2: 51  CO: 7.02  CI: 3.04  SCR: 581      Continuous Infusions:   eptifibatide 2 mcg/kg/min (07/10/22 1101)    heparin (porcine) in D5W 22 Units/kg/hr (07/10/22 1101)     Scheduled Meds:   allopurinoL  100 mg Oral Daily    amiodarone  400 mg Oral Daily    amLODIPine  10 mg Oral Daily    aspirin  325 mg Oral Daily    busPIRone  5 mg Oral TID    levothyroxine  112 mcg Oral Before breakfast    LIDOcaine  1 patch Transdermal Q24H    mexiletine  250 mg Oral Q8H    pantoprazole  40 mg Oral Daily with lunch     PRN Meds:acetaminophen, ALPRAZolam, famotidine, sodium chloride 0.9%, zolpidem    Review of patient's allergies indicates:   Allergen Reactions    Lisinopril Anaphylaxis    Hydralazine analogues      Chronic constipation, impotence, dizziness     Objective:     Vital Signs (Most Recent):  Temp: 98.6 °F (37 °C) (07/10/22 0701)  Pulse: 84 (07/10/22 1101)  Resp: (!) 23 (07/10/22 1101)  BP: (!) 88/0 (07/10/22 1101)  SpO2: 95 % (07/10/22 0743)   Vital Signs (24h Range):  Temp:  [98.2 °F (36.8 °C)-98.8 °F (37.1 °C)] 98.6 °F (37 °C)  Pulse:  [79-92] 84  Resp:  [23-51] 23  SpO2:  [94 %-97 %] 95 %  BP: ()/(0-73)  88/0  Arterial Line BP: (81)/(74) 81/74     Patient Vitals for the past 72 hrs (Last 3 readings):   Weight   07/10/22 0400 107.6 kg (237 lb 3.4 oz)   07/09/22 2202 108.3 kg (238 lb 12.1 oz)   07/08/22 1000 102.1 kg (225 lb)     Body mass index is 31.3 kg/m².      Intake/Output Summary (Last 24 hours) at 7/10/2022 1144  Last data filed at 7/10/2022 1101  Gross per 24 hour   Intake 1241.76 ml   Output 2300 ml   Net -1058.24 ml       Hemodynamic Parameters:       Telemetry: No events    Physical Exam  Constitutional:       General: He is not in acute distress.     Appearance: He is obese.   HENT:      Head: Normocephalic.      Mouth/Throat:      Mouth: Mucous membranes are moist.   Eyes:      Extraocular Movements: Extraocular movements intact.   Cardiovascular:      Rate and Rhythm: Normal rate and regular rhythm.      Comments: VAD hum appreciated  Pulses detected via doppler  Pulmonary:      Effort: No respiratory distress.      Breath sounds: Normal breath sounds.      Comments: Coarse breath sounds at the bases bilaterally  Abdominal:      General: Bowel sounds are normal. There is no distension.      Palpations: Abdomen is soft.      Tenderness: There is no abdominal tenderness.   Musculoskeletal:      Cervical back: Neck supple.   Skin:     General: Skin is warm.   Neurological:      General: No focal deficit present.      Mental Status: He is oriented to person, place, and time.   Psychiatric:         Mood and Affect: Mood normal.         Behavior: Behavior normal.       Significant Labs:  CBC:  Recent Labs   Lab 07/08/22  0348 07/09/22  0843 07/10/22  0210   WBC 7.25 5.59 5.69   RBC 3.48* 3.36* 3.45*   HGB 9.2* 8.8* 9.1*   HCT 30.5* 28.9* 30.0*    206 205   MCV 88 86 87   MCH 26.4* 26.2* 26.4*   MCHC 30.2* 30.4* 30.3*     BNP:  No results for input(s): BNP in the last 168 hours.    Invalid input(s): BNPTRIAGELBLO  CMP:  Recent Labs   Lab 07/04/22  0330 07/04/22  0825 07/09/22  0426 07/09/22  1102  07/09/22  1727 07/10/22  0210      < > 91  --  114* 88   CALCIUM 10.0   < > 9.8  --  9.0 9.8   ALBUMIN 3.2*  --   --   --   --   --    PROT 8.1  --   --   --   --   --    *   < > 135*  --  134* 136   K 3.4*   < > 3.6 3.9 4.0 3.6   CO2 28   < > 25  --  21* 26   CL 94*   < > 100  --  100 101   BUN 37*   < > 21*  --  23* 22*   CREATININE 2.3*   < > 1.6*  --  1.9* 1.8*   ALKPHOS 102  --   --   --   --   --    ALT 57*  --   --   --   --   --    *  --   --   --   --   --    BILITOT 3.0*  --   --   --   --   --     < > = values in this interval not displayed.      Coagulation:   Recent Labs   Lab 07/04/22  0330 07/05/22  0400 07/09/22  1102 07/09/22  1727 07/10/22  0210   INR 1.3*  --   --   --   --    APTT 39.1*   < > 41.6* 39.7* 52.2*    < > = values in this interval not displayed.     LDH:  Recent Labs   Lab 07/07/22  1814 07/08/22  0348 07/09/22  0426 07/10/22  0210   LDH 1,355* 1,384* 1,216* 1,086*     Microbiology:  Microbiology Results (last 7 days)       ** No results found for the last 168 hours. **            I have reviewed all pertinent labs within the past 24 hours.    Estimated Creatinine Clearance: 59.7 mL/min (A) (based on SCr of 1.8 mg/dL (H)).    Diagnostic Results:  I have reviewed and interpreted all pertinent imaging results/findings within the past 24 hours.    Assessment and Plan:     55 Y/O M with Hx of stage D HFrEF (EF 10%) due to NICM underwent HM2 implant 3/8/18 and exchange of outflow graft 3/9/18, Hx VT/VF s/p SICD 2014 presenting with gradual worsening shortness of breath associated with nonpleuritic, nonradiating bilateral 4/10 retrosternal chest pressure pain.  Symptoms began yesterday and have gradually worsened in the last 12 hours.  He does report going to a family picnic with increased consumption of chicken wings, ribs and other salty meat products.  Prior to symptom onset, he reports nausea, nonbloody diarrhea began yesterday and single episode of nonbloody  nonbilious vomiting this morning. He also complains of dizziness, lightheadedness, and a mild HA. SOB worsened this morning prompting him to come to the ED.     In the ED, patient was in respiratory distress requiring BiPAP. MAP 96 and started on Nitro gtt. Work-up revealed WBC 10, K 5.4, Cr 2.5 (baseline 1.9), BNP 1950 (baseline 200-300s), Bili 2.1, LDH 1058, and INR 3.2. Bedside TTE with severely reduced EF, AV not opening, septum bulging into RV. Given IV Lasix 80 mg x1 with only 100-200 mL UOP and dark in color. HM2 interrogated and flows have been 8.5-9 L/min with no alarms or power surges. Cardiology consulted in the ED, dicussed with HTS, and decision made to admit to ICU for further mgmt.       * Left ventricular assist device (LVAD) complication  -Usually an elevated LDH is a red flag in a patient with LVAD, especially HM2 given the risk of pump thrombosis. PI, power and flow are elevated when compared to prior clinic visits  -LDH is now downtrending, but there is continued concern for red cell shedding  -Haptoglobin low (although it has been chronically low)  -Direct and indirect wendy test are negative  -Plasma free hemoglobin elevated  - concerned that he could have some degree of thrombosis, since adding a second antiplatelet agent didn't resolved it, Integrilin with a Heparin drip were started until we have lab confirmation that this issue that resolved (normalization of LDH etc)  -Integrilin increased to 2 mcg/kg/min on 7/8/22  -Heparin drip  -Aspirin 325mg daily  -CTS planning for upgrade to HM3 next week    Procedure: Device Interrogation Including analysis of device parameters  Current Settings: Ventricular Assist Device    TXP LVAD INTERROGATIONS 7/10/2022 7/10/2022 7/10/2022 7/10/2022 7/10/2022 7/10/2022 7/10/2022   Type HeartMate II HeartMate II HeartMate II HeartMate II HeartMate II HeartMate II HeartMate II   Flow 7.4 7.8 8.5 8.8 9.0 8.7 7.9   Speed 9800 9800 9800 9800 9800 9800 9800   PI 3.0  2.2. 2.4 2.5 2.4 2.6 2.4   Power (Jackson) 7.6 8.0 8.2 8.5 8.4 8.3 8.1   LSL - - - - - 9400 9400   Pulsatility Intermittent pulse Intermittent pulse Intermittent pulse Intermittent pulse Intermittent pulse Intermittent pulse Intermittent pulse       Anxiety  -Zolpidem night prn  -Alprazolam prn anxiety  -Consulting psych for additional assistance  -Starting Melatonin 6 mg QHS PRN  -Doppler pulses only (no BP cuff) at night to encourage sleep  -Exchanged patient's bed    History of ventricular tachycardia  Patient experienced an episode of VT on 6/30 and experienced an ICD shock thought to be related to hypokalemia (K of 3.1 on 6/30)  - Plan to aggressively replete K, keep K>4 and Mg>2  - Will continue mexiletine 25mg q8hrs and amiodarone 400mg daily    COVID-19  -Previously hypoxic, now off oxygen  -ID was consulted, recommended Remdesivir, course completed    Elevated serum lactate dehydrogenase (LDH)  Concerned for pump thrombosis  Cont to trend  Plan for possible pump exchange next week      amiodarone induced hypothyrodism  -Continue levothyroxine 112 mcg     LVAD (left ventricular assist device) present  Procedure: Device Interrogation Including analysis of device parameters  Current Settings: Ventricular Assist Device  Review of device function is stable    TXP LVAD INTERROGATIONS 7/10/2022 7/10/2022 7/10/2022 7/10/2022 7/10/2022 7/10/2022 7/10/2022   Type HeartMate II HeartMate II HeartMate II HeartMate II HeartMate II HeartMate II HeartMate II   Flow 7.4 7.8 8.5 8.8 9.0 8.7 7.9   Speed 9800 9800 9800 9800 9800 9800 9800   PI 3.0 2.2. 2.4 2.5 2.4 2.6 2.4   Power (Jackson) 7.6 8.0 8.2 8.5 8.4 8.3 8.1   LSL - - - - - 9400 9400   Pulsatility Intermittent pulse Intermittent pulse Intermittent pulse Intermittent pulse Intermittent pulse Intermittent pulse Intermittent pulse       CKD (chronic kidney disease), stage III  WAGNER on CKD  Improved and back to patient's baseline of 1.5-2.0  - Avoiding nephrotoxic  medications  - Continue to monitor  - Strict I/O's    Chronic combined systolic and diastolic heart failure  #ADHF  #NICMP  -Admitted with acute decompensated HF presence of HM2 LVAD  -Continue Amlodipine 10mg daily  -Holding Warfarin, Heparin started instead  -Speed echo at 9800 showed bowing to the [place] [id number], IVS was at midline, MR decreased but worse AR  -Was placed on lasix gtt but appeared euvolemic and lasix gtt was stopped, and Lasix boluses were administered. Holding off on additional Lasix today         Harry Canales MD  Heart Transplant  [facility] - Cardiac Intensive [facility]

## 2022-07-10 NOTE — SUBJECTIVE & OBJECTIVE
Patient History               Medical as of 7/10/2022       Past Medical History       Diagnosis Date Comments Source    Anticoagulant long-term use -- -- Provider    CHF (congestive heart failure) -- -- Provider    Class 1 obesity due to excess calories with serious comorbidity and body mass index (BMI) of 31.0 to 31.9 in adult -- -- Provider    Dilated cardiomyopathy 1/10/2018 -- Provider    Disorder of kidney and ureter -- CKD Provider    Encounter for blood transfusion -- -- Provider    Gout -- -- Provider    HTN (hypertension) -- -- Provider    Hx of psychiatric care -- -- Provider    ICD (implantable cardioverter-defibrillator) infection 7/1/2020 -- Provider    Psychiatric problem -- -- Provider    Thyroid disease -- -- Provider    Ventricular tachycardia (paroxysmal) -- -- Provider              Pertinent Negatives       Diagnosis Date Noted Comments Source    Arthritis 07/01/2020 -- Provider    Asthma 07/01/2020 -- Provider    Cancer 07/01/2020 -- Provider    COPD (chronic obstructive pulmonary disease) 07/01/2020 -- Provider    Diabetes mellitus 07/07/2018 -- Provider    Headache 04/28/2021 -- Provider    History of psychiatric hospitalization 04/28/2021 -- Provider    Seizures 07/01/2020 -- Provider    Stroke 07/01/2020 -- Provider    Suicide attempt 04/28/2021 -- Provider    Therapy 04/28/2021 -- Provider    Transfusion reaction 07/01/2020 -- Provider                          Surgical as of 7/10/2022       Past Surgical History       Procedure Laterality Date Comments Source    CARDIAC CATHETERIZATION -- Dec. 2012 -- Provider    CARDIAC DEFIBRILLATOR PLACEMENT Left -- CRRT-D Provider    COLONOSCOPY N/A 3/6/2018 Procedure: COLONOSCOPY;  Surgeon: Alonso Bone MD;  Location: [facility] ([address] [place] [address]);  Service: Endoscopy;  Laterality: N/A; Provider    ESOPHAGOGASTRODUODENOSCOPY N/A 7/17/2019 Procedure: EGD (ESOPHAGOGASTRODUODENOSCOPY);  Surgeon: Blane Valdez MD;  Location: [facility] ([address] [place] [address]);  Service:  Endoscopy;  Laterality: N/A; Provider    COLONOSCOPY N/A 7/17/2019 Procedure: COLONOSCOPY;  Surgeon: Blane Valdez MD;  Location: [facility] ENDO (2ND FLR);  Service: Endoscopy;  Laterality: N/A; Provider    ESOPHAGOGASTRODUODENOSCOPY N/A 7/18/2019 Procedure: EGD (ESOPHAGOGASTRODUODENOSCOPY);  Surgeon: Blane Valdez MD;  Location: [facility] ENDO (2ND FLR);  Service: Endoscopy;  Laterality: N/A; Provider    COLONOSCOPY N/A 7/18/2019 Procedure: COLONOSCOPY;  Surgeon: Blane Valdez MD;  Location: [facility] ENDO (2ND FLR);  Service: Endoscopy;  Laterality: N/A; Provider    NONINVASIVE CARDIAC ELECTROPHYSIOLOGY STUDY N/A 10/18/2019 Procedure: CARDIAC ELECTROPHYSIOLOGY STUDY, NONINVASIVE;  Surgeon: Raz Wagner MD;  Location: [facility] EP LAB;  Service: Cardiology;  Laterality: N/A;  VT, DFTs, MDT CRTD in situ, LVAD, anes, MB, 3098 Provider    TREATMENT OF CARDIAC ARRHYTHMIA -- 10/18/2019 Procedure: Cardioversion or Defibrillation;  Surgeon: Raz Wagner MD;  Location: [facility] EP LAB;  Service: Cardiology;; Provider    REVISION OF IMPLANTABLE CARDIOVERTER-DEFIBRILLATOR (ICD) ELECTRODE LEAD PLACEMENT N/A 3/9/2020 Procedure: REVISION, INSERTION, ELECTRODE LEAD, ICD;  Surgeon: Harry Yun MD;  Location: [facility] EP LAB;  Service: Cardiology;  Laterality: N/A;  VT, ICD Gen Change and Lead Revision, MDT, MAC, DM,3 Prep Provider    REPLACEMENT OF IMPLANTABLE CARDIOVERTER-DEFIBRILLATOR (ICD) GENERATOR N/A 3/9/2020 Procedure: REPLACEMENT, ICD GENERATOR;  Surgeon: Harry Yun MD;  Location: [facility] EP LAB;  Service: Cardiology;  Laterality: N/A;  VT, ICD Gen Change and Lead Revision, MDT, MAC, DM,3 Prep Provider                          Family as of 7/10/2022       Problem Relation Name Age of Onset Comments Source    Hypertension Father -- -- -- Provider    Diabetes Father -- -- -- Provider    Coronary artery disease Father -- -- -- Provider    Heart disease Father -- -- CHF Provider    No Known Problems Mother -- -- -- Provider    Cancer Sister  -- 54 breast CA Provider    No Known Problems Brother -- -- -- Provider    Anxiety disorder Neg Hx -- -- -- Provider    Depression Neg Hx -- -- -- Provider    Dementia Neg Hx -- -- -- Provider    Bipolar disorder Neg Hx -- -- -- Provider    Suicide Neg Hx -- -- -- Provider                  Tobacco Use as of 7/10/2022       Smoking Status Smoking Start Date Smoking Quit Date Packs/Day Years Used    Former Smoker -- 1/12/2018 1.00 31.00      Types Comments Smokeless Tobacco Status Smokeless Tobacco Quit Date Source     Cigarettes pt is quiting on his own - pt stated not qualified for program; 2/16 pt  quit on his own Never Used -- Provider                  Alcohol Use as of 7/10/2022       Alcohol Use Drinks/Week Alcohol/Week Comments Source    No 0 Standard drinks or equivalent 0.0 standard drinks quit Provider                  Drug Use as of 7/10/2022       Drug Use Types Frequency Comments Source    No -- -- -- Provider                  Sexual Activity as of 7/10/2022       Sexually Active Birth Control Partners Comments Source    Yes None Female 10/5/17  with same partner 7 years  Provider                  Activities of Daily Living as of 7/10/2022       Activities of Daily Living Question Response Comments Source    Patient feels they ought to cut down on drinking/drug use Not Asked -- Provider    Patient annoyed by others criticizing their drinking/drug use Not Asked -- Provider    Patient has felt bad or guilty about drinking/drug use Not Asked -- Provider    Patient has had a drink/used drugs as an eye opener in the AM Not Asked -- Provider                  Social Documentation as of 7/10/2022    Disabled    Source: Provider               Occupational as of 7/10/2022       Occupation Employer Comments Source    -- [employer]  -- Provider                  Socioeconomic as of 7/10/2022       Marital Status Spouse Name Number of Children Years Education Education Level Preferred Language Ethnicity  Race Source     -- 3 -- -- English Not  or /a Black or  Provider                  Pertinent History       Question Response Comments    Lives with family wife and step son    Place in Birth Order -- --    Lives in home --    Number of Siblings -- --    Raised by -- grew up in [place], [place]    Legal Involvement none --    Childhood Trauma -- --    Criminal History of none --    Financial Status disabled --    Highest Level of Education unfinished college [employer]    Does patient have access to a firearm? Yes --     Service No --    Primary Leisure Activity other TV, family    Spirituality actively participates in organized [employer]          Past Medical History:   Diagnosis Date    Anticoagulant long-term use     CHF (congestive heart failure)     Class 1 obesity due to excess calories with serious comorbidity and body mass index (BMI) of 31.0 to 31.9 in adult     Dilated cardiomyopathy 1/10/2018    Disorder of kidney and ureter     CKD    Encounter for blood transfusion     Gout     HTN (hypertension)     Hx of psychiatric care     ICD (implantable cardioverter-defibrillator) infection 7/1/2020    Psychiatric problem     Thyroid disease     Ventricular tachycardia (paroxysmal)      Past Surgical History:   Procedure Laterality Date    CARDIAC CATHETERIZATION  Dec. 2012    CARDIAC DEFIBRILLATOR PLACEMENT Left     CRRT-D    COLONOSCOPY N/A 3/6/2018    Procedure: COLONOSCOPY;  Surgeon: Alonso Bone MD;  Location: [facility] ([address] [place] [address]);  Service: Endoscopy;  Laterality: N/A;    COLONOSCOPY N/A 7/17/2019    Procedure: COLONOSCOPY;  Surgeon: Blane Valdez MD;  [facility]  Service: Endoscopy;  Laterality: N/A;    COLONOSCOPY N/A 7/18/2019    Procedure: COLONOSCOPY;  Surgeon: Blane Valdez MD;  [facility]  Service: Endoscopy;  Laterality: N/A;    ESOPHAGOGASTRODUODENOSCOPY N/A 7/17/2019    Procedure: EGD (ESOPHAGOGASTRODUODENOSCOPY);   Surgeon: Blane Valdez MD;  Location: [facility] ENDO (2ND FLR);  Service: Endoscopy;  Laterality: N/A;    ESOPHAGOGASTRODUODENOSCOPY N/A 2019    Procedure: EGD (ESOPHAGOGASTRODUODENOSCOPY);  Surgeon: Blane Valdez MD;  Location: [facility] ENDO (2ND FLR);  Service: Endoscopy;  Laterality: N/A;    NONINVASIVE CARDIAC ELECTROPHYSIOLOGY STUDY N/A 10/18/2019    Procedure: CARDIAC ELECTROPHYSIOLOGY STUDY, NONINVASIVE;  Surgeon: Raz Wagner MD;  Location: [facility] EP LAB;  Service: Cardiology;  Laterality: N/A;  VT, DFTs, MDT CRTD in situ, LVAD, anes, MB, 3098    REPLACEMENT OF IMPLANTABLE CARDIOVERTER-DEFIBRILLATOR (ICD) GENERATOR N/A 3/9/2020    Procedure: REPLACEMENT, ICD GENERATOR;  Surgeon: Harry Yun MD;  Location: [facility] EP LAB;  Service: Cardiology;  Laterality: N/A;  VT, ICD Gen Change and Lead Revision, MDT, MAC, DM,3 Prep    REVISION OF IMPLANTABLE CARDIOVERTER-DEFIBRILLATOR (ICD) ELECTRODE LEAD PLACEMENT N/A 3/9/2020    Procedure: REVISION, INSERTION, ELECTRODE LEAD, ICD;  Surgeon: Harry Yun MD;  Location: [facility] EP LAB;  Service: Cardiology;  Laterality: N/A;  VT, ICD Gen Change and Lead Revision, MDT, MAC, DM,3 Prep    TREATMENT OF CARDIAC ARRHYTHMIA  10/18/2019    Procedure: Cardioversion or Defibrillation;  Surgeon: Raz Wagner MD;  Location: [facility] EP LAB;  Service: Cardiology;;     Family History       Problem Relation (Age of Onset)    Cancer Sister (54)    Coronary artery disease Father    Diabetes Father    Heart disease Father    Hypertension Father    No Known Problems Mother, Brother          Tobacco Use    Smoking status: Former Smoker     Packs/day: 1.00     Years: 31.00     Pack years: 31.00     Types: Cigarettes     Quit date: 2018     Years since quittin.4    Smokeless tobacco: Never Used    Tobacco comment: pt is quiting on his own - pt stated not qualified for program;  pt  quit on his own   Substance and Sexual Activity    Alcohol use: No     Alcohol/week: 0.0 standard  drinks     Comment: quit    Drug use: No    Sexual activity: Yes     Partners: Female     Birth control/protection: None     Comment: 10/5/17  with same partner 7 years      Review of patient's allergies indicates:   Allergen Reactions    Lisinopril Anaphylaxis    Hydralazine analogues      Chronic constipation, impotence, dizziness       No current facility-administered medications on file prior to encounter.     Current Outpatient Medications on File Prior to Encounter   Medication Sig    allopurinoL (ZYLOPRIM) 100 MG tablet TAKE 1 TABLET (100 MG) BY MOUTH NIGHTLY.    ALPRAZolam (XANAX) 1 MG tablet Take 1 tablet (1 mg total) by mouth daily as needed for Anxiety. Bid prn    amiodarone (PACERONE) 200 MG Tab Take 2 tablets (400 mg total) by mouth once daily.    amLODIPine (NORVASC) 10 MG tablet TAKE 1 TABLET BY MOUTH EVERY DAY    aspirin (ECOTRIN) 81 MG EC tablet Take 1 tablet (81 mg total) by mouth once daily.    busPIRone (BUSPAR) 5 MG Tab Take 1 tablet (5 mg total) by mouth 3 (three) times daily.    levothyroxine (SYNTHROID) 112 MCG tablet Take 1 tablet (112 mcg total) by mouth before breakfast.    mexiletine (MEXITIL) 250 MG Cap Take 1 capsule (250 mg total) by mouth every 8 (eight) hours.    pantoprazole (PROTONIX) 40 MG tablet TAKE 1 TABLET (40 MG TOTAL) BY MOUTH DAILY WITH LUNCH.    warfarin (COUMADIN) 5 MG tablet TAKE 7.5 MG ORALLY DAILY EVERY SUNDAY AND THURSDAY, TAKE 5 MG ORALLY DAILY ON OTHER DAYS    zolpidem (AMBIEN CR) 12.5 MG CR tablet Take 1 tablet (12.5 mg total) by mouth nightly as needed for Insomnia.    [DISCONTINUED] ferrous gluconate (FERGON) 324 MG tablet TAKE 1 TABLET (324 MG TOTAL) BY MOUTH WITH LUNCH.    [DISCONTINUED] sildenafiL (VIAGRA) 100 MG tablet Take 1 tablet (100 mg total) by mouth daily as needed for Erectile Dysfunction.    [DISCONTINUED] torsemide (DEMADEX) 20 MG Tab Take 1 tablet (20 mg total) by mouth once daily.     Psychotherapeutics (From admission, onward)            "     Start     Stop Route Frequency Ordered    06/27/22 2100  busPIRone tablet 5 mg         -- Oral 3 times daily 06/27/22 1514    06/27/22 1603  zolpidem tablet 5 mg         -- Oral Nightly PRN 06/27/22 1514    06/27/22 1600  ALPRAZolam tablet 1 mg         -- Oral Daily PRN 06/27/22 1514          Review of Systems  - patient denied other complaints at this time     Strengths and Liabilities: Strength: Patient is expressive/articulate., Liability: Patient has poor health.    Objective:     Vital Signs (Most Recent):  Temp: 98.4 °F (36.9 °C) (07/10/22 1101)  Pulse: 76 (07/10/22 1501)  Resp: (!) 22 (07/10/22 1501)  BP: (!) 86/0 (07/10/22 1400)  SpO2: 95 % (07/10/22 0743)   Vital Signs (24h Range):  Temp:  [98.2 °F (36.8 °C)-98.8 °F (37.1 °C)] 98.4 °F (36.9 °C)  Pulse:  [76-92] 76  Resp:  [22-51] 22  SpO2:  [94 %-97 %] 95 %  BP: ()/(0-73) 86/0     Height: 6' 1" (185.4 cm)  Weight: 107.6 kg (237 lb 3.4 oz)  Body mass index is 31.3 kg/m².      Intake/Output Summary (Last 24 hours) at 7/10/2022 1521  Last data filed at 7/10/2022 1501  Gross per 24 hour   Intake 1301.68 ml   Output 2100 ml   Net -798.32 ml       Physical Exam  Vitals and nursing note reviewed.   HENT:      Head: Normocephalic.   Eyes:      Extraocular Movements: Extraocular movements intact.   Pulmonary:      Effort: Pulmonary effort is normal.   Musculoskeletal:      Cervical back: Normal range of motion.   Skin:     General: Skin is dry.   Neurological:      Mental Status: He is alert and oriented to person, place, and time. Mental status is at baseline.   Psychiatric:      Comments: Mental Status Exam:  Appearance: unremarkable, age appropriate  Behavior/Cooperation: normal but somewhat guarded  Speech: normal tone, normal rate, normal volume  Mood: "frustrated"   Affect: normal  Thought Process: normal and logical  Thought Content: normal, no SI/HI/AVH reported  Orientation: grossly intact  Memory: Grossly intact  Attention Span/Concentration: " Normal  Insight: fair  Judgment: fair

## 2022-07-10 NOTE — ASSESSMENT & PLAN NOTE
-Usually an elevated LDH is a red flag in a patient with LVAD, especially HM2 given the risk of pump thrombosis. PI, power and flow are elevated when compared to prior clinic visits  -LDH is now downtrending, but there is continued concern for red cell shedding  -Haptoglobin low (although it has been chronically low)  -Direct and indirect wendy test are negative  -Plasma free hemoglobin elevated  - concerned that he could have some degree of thrombosis, since adding a second antiplatelet agent didn't resolved it, Integrilin with a Heparin drip were started until we have lab confirmation that this issue that resolved (normalization of LDH etc)  -Integrilin increased to 2 mcg/kg/min on 7/8/22  -Heparin drip  -Aspirin 325mg daily  -CTS planning for upgrade to HM3 next week    Procedure: Device Interrogation Including analysis of device parameters  Current Settings: Ventricular Assist Device    TXP LVAD INTERROGATIONS 7/10/2022 7/10/2022 7/10/2022 7/10/2022 7/10/2022 7/10/2022 7/10/2022   Type HeartMate II HeartMate II HeartMate II HeartMate II HeartMate II HeartMate II HeartMate II   Flow 7.4 7.8 8.5 8.8 9.0 8.7 7.9   Speed 9800 9800 9800 9800 9800 9800 9800   PI 3.0 2.2. 2.4 2.5 2.4 2.6 2.4   Power (Jackson) 7.6 8.0 8.2 8.5 8.4 8.3 8.1   LSL - - - - - 9400 9400   Pulsatility Intermittent pulse Intermittent pulse Intermittent pulse Intermittent pulse Intermittent pulse Intermittent pulse Intermittent pulse

## 2022-07-10 NOTE — PLAN OF CARE
Cardiac ICU Care Plan    POC reviewed with Tim Richards and family. Questions and concerns addressed. Pt VSS. No alarms on LVAD. No acute events today. Pt progressing toward goals. Will continue to monitor. See below and flowsheets for full assessment and VS info.       Neuro:  Deedee Coma Scale  Best Eye Response: 4-->(E4) spontaneous  Best Motor Response: 6-->(M6) obeys commands  Best Verbal Response: 5-->(V5) oriented  Deedee Coma Scale Score: 15  Assessment Qualifiers: patient not sedated/intubated  Pupil PERRLA: yes    24 hr Temp:  [98.2 °F (36.8 °C)-98.8 °F (37.1 °C)]      CV:  Rhythm: normal sinus rhythm   DVT prophylaxis: VTE Required Core Measure: Pharmacological prophylaxis initiated/maintained    CVP (mean): 9 mmHg (07/10/22 1501)       SVO2 (%): 63 % (07/05/22 0400)       Type: HeartMate II       Pulses  Right Radial Pulse: Doppler  Left Radial Pulse: Doppler  Right Dorsalis Pedis Pulse: Doppler  Left Dorsalis Pedis Pulse: Doppler  Right Posterior Tibial Pulse: Doppler  Left Posterior Tibial Pulse: Doppler    Resp:  O2 Device (Oxygen Therapy): room air  Flow (L/min): 2  Oxygen Concentration (%): 95    GI/:  GI prophylaxis: no  Diet/Nutrition Received: 2 gram sodium, low saturated fat/low cholesterol  Last Bowel Movement: 07/08/22  Voiding Characteristics: voids spontaneously without difficulty   Intake/Output Summary (Last 24 hours) at 7/10/2022 1709  Last data filed at 7/10/2022 1700  Gross per 24 hour   Intake 1301.78 ml   Output 1475 ml   Net -173.22 ml          Labs/Accuchecks:  Recent Labs   Lab 07/08/22  0348 07/09/22  0843 07/10/22  0210   WBC 7.25 5.59 5.69   RBC 3.48* 3.36* 3.45*   HGB 9.2* 8.8* 9.1*   HCT 30.5* 28.9* 30.0*    206 205      Recent Labs   Lab 07/04/22  0330 07/05/22  0400 07/09/22  1102 07/09/22  1727 07/10/22  0210   INR 1.3*  --   --   --   --    APTT 39.1*   < > 41.6* 39.7* 52.2*    < > = values in this interval not displayed.      Recent Labs      07/10/22  0210      K 3.6   CO2 26      BUN 22*   CREATININE 1.8*     No results for input(s): CPK, CPKMB, MB, TROPONINI in the last 168 hours.   Recent Labs     07/08/22  0429 07/09/22  0429 07/10/22  0616   PH 7.374 7.378 7.388   PCO2 44.0 48.1* 47.2*   PO2 27* 29* 28*   HCO3 25.6 28.3* 28.4*   POCSATURATED 48* 52* 51*   BE 0 3 3       Electrolytes: No replacement orders  Accuchecks: none    Gtts/LDAs:   eptifibatide 2 mcg/kg/min (07/10/22 1700)    heparin (porcine) in D5W 22 Units/kg/hr (07/10/22 1700)       Lines/Drains/Airways       Central Venous Catheter Line  Duration             Percutaneous Central Line Insertion/Assessment - Triple Lumen  07/02/22 1522 right internal jugular 8 days              Line  Duration                  VAD 03/08/18 1124 Left ventricular assist device HeartMate II 1585 days              Peripheral Intravenous Line  Duration                  Midline Catheter Insertion/Assessment  - Single Lumen 06/28/22 1027 Right cephalic vein (lateral side of arm) 20g x 8cm 12 days                    Skin/Wounds  Bathing/Skin Care: linen changed (07/10/22 0701)  Wounds: No  Wound care consulted: No

## 2022-07-10 NOTE — ASSESSMENT & PLAN NOTE
#ADHF  #NICMP  -Admitted with acute decompensated HF presence of HM2 LVAD  -Continue Amlodipine 10mg daily  -Holding Warfarin, Heparin started instead  -Speed echo at 9800 showed bowing to [place] [id number], IVS was at midline, MR decreased but worse AR  -Was placed on lasix gtt but appeared euvolemic and lasix gtt was stopped, and Lasix boluses were administered. Holding off on additional Lasix today

## 2022-07-11 ENCOUNTER — ANESTHESIA EVENT (OUTPATIENT)
Dept: SURGERY | Facility: HOSPITAL | Age: 57
DRG: 001 | End: 2022-07-11
Payer: COMMERCIAL

## 2022-07-11 LAB
ALLENS TEST: ABNORMAL
ALLENS TEST: ABNORMAL
ANION GAP SERPL CALC-SCNC: 10 MMOL/L (ref 8–16)
ANION GAP SERPL CALC-SCNC: 9 MMOL/L (ref 8–16)
APTT BLDCRRT: 47.3 SEC (ref 21–32)
BASOPHILS # BLD AUTO: 0.01 K/UL (ref 0–0.2)
BASOPHILS NFR BLD: 0.2 % (ref 0–1.9)
BUN SERPL-MCNC: 19 MG/DL (ref 6–20)
BUN SERPL-MCNC: 21 MG/DL (ref 6–20)
CALCIUM SERPL-MCNC: 9.6 MG/DL (ref 8.7–10.5)
CALCIUM SERPL-MCNC: 9.8 MG/DL (ref 8.7–10.5)
CHLORIDE SERPL-SCNC: 101 MMOL/L (ref 95–110)
CHLORIDE SERPL-SCNC: 102 MMOL/L (ref 95–110)
CO2 SERPL-SCNC: 25 MMOL/L (ref 23–29)
CO2 SERPL-SCNC: 25 MMOL/L (ref 23–29)
CREAT SERPL-MCNC: 1.5 MG/DL (ref 0.5–1.4)
CREAT SERPL-MCNC: 1.8 MG/DL (ref 0.5–1.4)
DELSYS: ABNORMAL
DIFFERENTIAL METHOD: ABNORMAL
EOSINOPHIL # BLD AUTO: 0.1 K/UL (ref 0–0.5)
EOSINOPHIL NFR BLD: 1.5 % (ref 0–8)
ERYTHROCYTE [DISTWIDTH] IN BLOOD BY AUTOMATED COUNT: 14.9 % (ref 11.5–14.5)
EST. GFR  (AFRICAN AMERICAN): 47.9 ML/MIN/1.73 M^2
EST. GFR  (AFRICAN AMERICAN): 59.7 ML/MIN/1.73 M^2
EST. GFR  (NON AFRICAN AMERICAN): 41.4 ML/MIN/1.73 M^2
EST. GFR  (NON AFRICAN AMERICAN): 51.7 ML/MIN/1.73 M^2
GLUCOSE SERPL-MCNC: 83 MG/DL (ref 70–110)
GLUCOSE SERPL-MCNC: 91 MG/DL (ref 70–110)
HCO3 UR-SCNC: 26.5 MMOL/L (ref 24–28)
HCO3 UR-SCNC: 27 MMOL/L (ref 24–28)
HCT VFR BLD AUTO: 27.3 % (ref 40–54)
HGB BLD-MCNC: 8.3 G/DL (ref 14–18)
IMM GRANULOCYTES # BLD AUTO: 0.02 K/UL (ref 0–0.04)
IMM GRANULOCYTES NFR BLD AUTO: 0.3 % (ref 0–0.5)
LDH SERPL L TO P-CCNC: 1043 U/L (ref 110–260)
LYMPHOCYTES # BLD AUTO: 0.8 K/UL (ref 1–4.8)
LYMPHOCYTES NFR BLD: 12.8 % (ref 18–48)
MAGNESIUM SERPL-MCNC: 1.9 MG/DL (ref 1.6–2.6)
MAGNESIUM SERPL-MCNC: 1.9 MG/DL (ref 1.6–2.6)
MCH RBC QN AUTO: 26.6 PG (ref 27–31)
MCHC RBC AUTO-ENTMCNC: 30.4 G/DL (ref 32–36)
MCV RBC AUTO: 88 FL (ref 82–98)
MODE: ABNORMAL
MONOCYTES # BLD AUTO: 1 K/UL (ref 0.3–1)
MONOCYTES NFR BLD: 16.5 % (ref 4–15)
NEUTROPHILS # BLD AUTO: 4.1 K/UL (ref 1.8–7.7)
NEUTROPHILS NFR BLD: 68.7 % (ref 38–73)
NRBC BLD-RTO: 0 /100 WBC
PCO2 BLDA: 46.1 MMHG (ref 35–45)
PCO2 BLDA: 51.7 MMHG (ref 35–45)
PH SMN: 7.33 [PH] (ref 7.35–7.45)
PH SMN: 7.37 [PH] (ref 7.35–7.45)
PLATELET # BLD AUTO: 200 K/UL (ref 150–450)
PMV BLD AUTO: 10.8 FL (ref 9.2–12.9)
PO2 BLDA: 25 MMHG (ref 40–60)
PO2 BLDA: 32 MMHG (ref 40–60)
POC BE: 1 MMOL/L
POC BE: 1 MMOL/L
POC SATURATED O2: 44 % (ref 95–100)
POC SATURATED O2: 55 % (ref 95–100)
POC TCO2: 28 MMOL/L (ref 24–29)
POC TCO2: 29 MMOL/L (ref 24–29)
POTASSIUM SERPL-SCNC: 3.8 MMOL/L (ref 3.5–5.1)
POTASSIUM SERPL-SCNC: 4.1 MMOL/L (ref 3.5–5.1)
RBC # BLD AUTO: 3.12 M/UL (ref 4.6–6.2)
SAMPLE: ABNORMAL
SAMPLE: ABNORMAL
SITE: ABNORMAL
SITE: ABNORMAL
SODIUM SERPL-SCNC: 136 MMOL/L (ref 136–145)
SODIUM SERPL-SCNC: 136 MMOL/L (ref 136–145)
WBC # BLD AUTO: 5.95 K/UL (ref 3.9–12.7)

## 2022-07-11 PROCEDURE — 93750 PR INTERROGATE VENT ASSIST DEV, IN PERSON, W PHYSICIAN ANALYSIS: ICD-10-PCS | Mod: ,,, | Performed by: INTERNAL MEDICINE

## 2022-07-11 PROCEDURE — 63600175 PHARM REV CODE 636 W HCPCS: Performed by: STUDENT IN AN ORGANIZED HEALTH CARE EDUCATION/TRAINING PROGRAM

## 2022-07-11 PROCEDURE — 63600175 PHARM REV CODE 636 W HCPCS: Performed by: INTERNAL MEDICINE

## 2022-07-11 PROCEDURE — 27000248 HC VAD-ADDITIONAL DAY

## 2022-07-11 PROCEDURE — 25000003 PHARM REV CODE 250: Performed by: STUDENT IN AN ORGANIZED HEALTH CARE EDUCATION/TRAINING PROGRAM

## 2022-07-11 PROCEDURE — 99900035 HC TECH TIME PER 15 MIN (STAT)

## 2022-07-11 PROCEDURE — 93750 INTERROGATION VAD IN PERSON: CPT | Mod: ,,, | Performed by: INTERNAL MEDICINE

## 2022-07-11 PROCEDURE — 94761 N-INVAS EAR/PLS OXIMETRY MLT: CPT

## 2022-07-11 PROCEDURE — 27000221 HC OXYGEN, UP TO 24 HOURS

## 2022-07-11 PROCEDURE — 99233 PR SUBSEQUENT HOSPITAL CARE,LEVL III: ICD-10-PCS | Mod: ,,, | Performed by: INTERNAL MEDICINE

## 2022-07-11 PROCEDURE — 83735 ASSAY OF MAGNESIUM: CPT | Performed by: INTERNAL MEDICINE

## 2022-07-11 PROCEDURE — 25000003 PHARM REV CODE 250: Performed by: INTERNAL MEDICINE

## 2022-07-11 PROCEDURE — 99233 SBSQ HOSP IP/OBS HIGH 50: CPT | Mod: ,,, | Performed by: INTERNAL MEDICINE

## 2022-07-11 PROCEDURE — 99232 SBSQ HOSP IP/OBS MODERATE 35: CPT | Mod: ,,, | Performed by: PSYCHIATRY & NEUROLOGY

## 2022-07-11 PROCEDURE — 85730 THROMBOPLASTIN TIME PARTIAL: CPT | Performed by: INTERNAL MEDICINE

## 2022-07-11 PROCEDURE — 20000000 HC ICU ROOM

## 2022-07-11 PROCEDURE — 99232 PR SUBSEQUENT HOSPITAL CARE,LEVL II: ICD-10-PCS | Mod: ,,, | Performed by: PSYCHIATRY & NEUROLOGY

## 2022-07-11 PROCEDURE — 83615 LACTATE (LD) (LDH) ENZYME: CPT | Performed by: STUDENT IN AN ORGANIZED HEALTH CARE EDUCATION/TRAINING PROGRAM

## 2022-07-11 PROCEDURE — 85025 COMPLETE CBC W/AUTO DIFF WBC: CPT | Performed by: INTERNAL MEDICINE

## 2022-07-11 PROCEDURE — A4216 STERILE WATER/SALINE, 10 ML: HCPCS | Performed by: STUDENT IN AN ORGANIZED HEALTH CARE EDUCATION/TRAINING PROGRAM

## 2022-07-11 PROCEDURE — 99223 PR INITIAL HOSPITAL CARE,LEVL III: ICD-10-PCS | Mod: ,,, | Performed by: CLINICAL NURSE SPECIALIST

## 2022-07-11 PROCEDURE — 80048 BASIC METABOLIC PNL TOTAL CA: CPT | Performed by: INTERNAL MEDICINE

## 2022-07-11 PROCEDURE — 82803 BLOOD GASES ANY COMBINATION: CPT

## 2022-07-11 PROCEDURE — 99223 1ST HOSP IP/OBS HIGH 75: CPT | Mod: ,,, | Performed by: CLINICAL NURSE SPECIALIST

## 2022-07-11 RX ORDER — SODIUM CHLORIDE 9 MG/ML
INJECTION, SOLUTION INTRAVENOUS CONTINUOUS
Status: DISCONTINUED | OUTPATIENT
Start: 2022-07-11 | End: 2022-07-13

## 2022-07-11 RX ORDER — MAGNESIUM SULFATE 1 G/100ML
1 INJECTION INTRAVENOUS ONCE
Status: COMPLETED | OUTPATIENT
Start: 2022-07-11 | End: 2022-07-11

## 2022-07-11 RX ORDER — FUROSEMIDE 10 MG/ML
40 INJECTION INTRAMUSCULAR; INTRAVENOUS ONCE
Status: COMPLETED | OUTPATIENT
Start: 2022-07-11 | End: 2022-07-11

## 2022-07-11 RX ORDER — MAGNESIUM SULFATE HEPTAHYDRATE 40 MG/ML
2 INJECTION, SOLUTION INTRAVENOUS ONCE
Status: COMPLETED | OUTPATIENT
Start: 2022-07-11 | End: 2022-07-12

## 2022-07-11 RX ORDER — POTASSIUM CHLORIDE 20 MEQ/1
20 TABLET, EXTENDED RELEASE ORAL ONCE
Status: COMPLETED | OUTPATIENT
Start: 2022-07-11 | End: 2022-07-11

## 2022-07-11 RX ORDER — CALCIUM CARBONATE 200(500)MG
500 TABLET,CHEWABLE ORAL ONCE
Status: COMPLETED | OUTPATIENT
Start: 2022-07-11 | End: 2022-07-11

## 2022-07-11 RX ORDER — FUROSEMIDE 10 MG/ML
80 INJECTION INTRAMUSCULAR; INTRAVENOUS ONCE
Status: DISCONTINUED | OUTPATIENT
Start: 2022-07-11 | End: 2022-07-13

## 2022-07-11 RX ADMIN — ALLOPURINOL 100 MG: 100 TABLET ORAL at 08:07

## 2022-07-11 RX ADMIN — MAGNESIUM SULFATE HEPTAHYDRATE 1 G: 500 INJECTION, SOLUTION INTRAMUSCULAR; INTRAVENOUS at 09:07

## 2022-07-11 RX ADMIN — HEPARIN SODIUM 22 UNITS/KG/HR: 5000 INJECTION INTRAVENOUS; SUBCUTANEOUS at 10:07

## 2022-07-11 RX ADMIN — EPTIFIBATIDE 2 MCG/KG/MIN: 0.75 INJECTION INTRAVENOUS at 05:07

## 2022-07-11 RX ADMIN — MEXILETINE HYDROCHLORIDE 250 MG: 250 CAPSULE ORAL at 09:07

## 2022-07-11 RX ADMIN — AMIODARONE HYDROCHLORIDE 400 MG: 200 TABLET ORAL at 08:07

## 2022-07-11 RX ADMIN — FUROSEMIDE 40 MG: 10 INJECTION, SOLUTION INTRAMUSCULAR; INTRAVENOUS at 12:07

## 2022-07-11 RX ADMIN — Medication 6 MG: at 11:07

## 2022-07-11 RX ADMIN — Medication 10 ML: at 01:07

## 2022-07-11 RX ADMIN — BUSPIRONE HYDROCHLORIDE 5 MG: 5 TABLET ORAL at 09:07

## 2022-07-11 RX ADMIN — EPTIFIBATIDE 2 MCG/KG/MIN: 0.75 INJECTION INTRAVENOUS at 06:07

## 2022-07-11 RX ADMIN — POTASSIUM CHLORIDE 20 MEQ: 1500 TABLET, EXTENDED RELEASE ORAL at 06:07

## 2022-07-11 RX ADMIN — SODIUM CHLORIDE: 0.9 INJECTION, SOLUTION INTRAVENOUS at 10:07

## 2022-07-11 RX ADMIN — ASPIRIN 325 MG: 325 TABLET, COATED ORAL at 08:07

## 2022-07-11 RX ADMIN — MAGNESIUM SULFATE HEPTAHYDRATE 2 G: 2 INJECTION, SOLUTION INTRAVENOUS at 10:07

## 2022-07-11 RX ADMIN — PANTOPRAZOLE SODIUM 40 MG: 40 TABLET, DELAYED RELEASE ORAL at 11:07

## 2022-07-11 RX ADMIN — CALCIUM CARBONATE (ANTACID) CHEW TAB 500 MG 500 MG: 500 CHEW TAB at 09:07

## 2022-07-11 RX ADMIN — LEVOTHYROXINE SODIUM 112 MCG: 112 TABLET ORAL at 06:07

## 2022-07-11 RX ADMIN — EPTIFIBATIDE 2 MCG/KG/MIN: 0.75 INJECTION INTRAVENOUS at 11:07

## 2022-07-11 RX ADMIN — BUSPIRONE HYDROCHLORIDE 5 MG: 5 TABLET ORAL at 02:07

## 2022-07-11 RX ADMIN — ZOLPIDEM TARTRATE 5 MG: 5 TABLET ORAL at 11:07

## 2022-07-11 RX ADMIN — MEXILETINE HYDROCHLORIDE 250 MG: 250 CAPSULE ORAL at 02:07

## 2022-07-11 RX ADMIN — EPTIFIBATIDE 2 MCG/KG/MIN: 0.75 INJECTION INTRAVENOUS at 12:07

## 2022-07-11 RX ADMIN — MEXILETINE HYDROCHLORIDE 250 MG: 250 CAPSULE ORAL at 06:07

## 2022-07-11 RX ADMIN — BUSPIRONE HYDROCHLORIDE 5 MG: 5 TABLET ORAL at 08:07

## 2022-07-11 RX ADMIN — AMLODIPINE BESYLATE 10 MG: 10 TABLET ORAL at 08:07

## 2022-07-11 NOTE — ASSESSMENT & PLAN NOTE
-Usually an elevated LDH is a red flag in a patient with LVAD, especially HM2 given the risk of pump thrombosis. PI, power and flow are elevated when compared to prior clinic visits  -LDH is now downtrending, but there is continued concern for red cell shedding  -Haptoglobin low (although it has been chronically low)  -Direct and indirect wendy test are negative  -Plasma free hemoglobin elevated  - concerned that he could have some degree of thrombosis, since adding a second antiplatelet agent didn't resolved it, Integrilin with a Heparin drip were started until we have lab confirmation that this issue that resolved (normalization of LDH etc)  -Integrilin increased to 2 mcg/kg/min on 7/8/22  -Heparin drip  -Aspirin 325mg daily  -CTS planning for upgrade to HM3 on Wednesday    Procedure: Device Interrogation Including analysis of device parameters  Current Settings: Ventricular Assist Device    TXP LVAD INTERROGATIONS 7/11/2022 7/11/2022 7/11/2022 7/11/2022 7/11/2022 7/11/2022 7/11/2022   Type HeartMate II HeartMate II HeartMate II HeartMate II HeartMate II HeartMate II HeartMate II   Flow 7.2 6.9 6.7 6.8 6.9 7.3 7.6   Speed 9800 9800 9800 9800 9800 9800 9800   PI 3.0 2.8 2.9 2.7 2.7 2.5 2.5   Power (Jackson) 7.4 7.3 7.1 7.3 7.3 7.5 7.8   LSL - - - - - - -   Pulsatility Intermittent pulse Intermittent pulse Intermittent pulse Intermittent pulse Intermittent pulse Intermittent pulse Intermittent pulse

## 2022-07-11 NOTE — ASSESSMENT & PLAN NOTE
Procedure: Device Interrogation Including analysis of device parameters  Current Settings: Ventricular Assist Device  Review of device function is stable    TXP LVAD INTERROGATIONS 7/11/2022 7/11/2022 7/11/2022 7/11/2022 7/11/2022 7/11/2022 7/11/2022   Type HeartMate II HeartMate II HeartMate II HeartMate II HeartMate II HeartMate II HeartMate II   Flow 7.2 6.9 6.7 6.8 6.9 7.3 7.6   Speed 9800 9800 9800 9800 9800 9800 9800   PI 3.0 2.8 2.9 2.7 2.7 2.5 2.5   Power (Jackson) 7.4 7.3 7.1 7.3 7.3 7.5 7.8   LSL - - - - - - -   Pulsatility Intermittent pulse Intermittent pulse Intermittent pulse Intermittent pulse Intermittent pulse Intermittent pulse Intermittent pulse

## 2022-07-11 NOTE — ASSESSMENT & PLAN NOTE
-Zolpidem night prn  -Alprazolam prn anxiety  -Psych was consulted, appreciate recs  -Continue Melatonin 6 mg QHS PRN  -Doppler pulses only (no BP cuff) at night to encourage sleep  -Exchanged patient's bed

## 2022-07-11 NOTE — PROGRESS NOTES
[facility]  Heart Transplant  Progress Note    Patient Name: Tim Richards  MRN: [id number]  Admission Date: 6/27/2022  Hospital Length of Stay: 14 days  Attending Physician: Amy Rhodes MD  Primary Care Provider: Deyanira Booth MD  Principal Problem:Left ventricular assist device (LVAD) complication    Subjective:     Interval History:   No acute events overnight  The patient has no complaints at this time.  He notes continued bleeding from his gum.    The patient's urine is yellow.    CVP: 12  SVO2: 55  CO: 7.94  CI: 3.45  SVR: 736    Continuous Infusions:   eptifibatide 2 mcg/kg/min (07/11/22 1400)    heparin (porcine) in D5W 22 Units/kg/hr (07/11/22 1400)     Scheduled Meds:   allopurinoL  100 mg Oral Daily    amiodarone  400 mg Oral Daily    amLODIPine  10 mg Oral Daily    aspirin  325 mg Oral Daily    busPIRone  5 mg Oral TID    levothyroxine  112 mcg Oral Before breakfast    LIDOcaine  1 patch Transdermal Q24H    mexiletine  250 mg Oral Q8H    pantoprazole  40 mg Oral Daily with lunch     PRN Meds:acetaminophen, ALPRAZolam, famotidine, melatonin, sodium chloride 0.9%, zolpidem    Review of patient's allergies indicates:   Allergen Reactions    Lisinopril Anaphylaxis    Hydralazine analogues      Chronic constipation, impotence, dizziness     Objective:     Vital Signs (Most Recent):  Temp: 98.3 °F (36.8 °C) (07/11/22 0701)  Pulse: 79 (07/11/22 1400)  Resp: (!) 26 (07/11/22 1400)  BP: (!) 84/0 (07/11/22 1400)  SpO2: 97 % (07/11/22 0402)   Vital Signs (24h Range):  Temp:  [97.8 °F (36.6 °C)-98.6 °F (37 °C)] 98.3 °F (36.8 °C)  Pulse:  [77-98] 79  Resp:  [19-41] 26  SpO2:  [95 %-98 %] 97 %  BP: (82-90)/(0) 84/0     Patient Vitals for the past 72 hrs (Last 3 readings):   Weight   07/11/22 0303 106 kg (233 lb 11 oz)   07/10/22 0400 107.6 kg (237 lb 3.4 oz)   07/09/22 2202 108.3 kg (238 lb 12.1 oz)     Body mass index is 30.83 kg/m².      Intake/Output Summary (Last 24  hours) at 7/11/2022 1505  Last data filed at 7/11/2022 1400  Gross per 24 hour   Intake 1257.14 ml   Output 1900 ml   Net -642.86 ml       Hemodynamic Parameters:       Telemetry: PVC, predominantly sinus rhythm    Physical Exam    Significant Labs:  CBC:  Recent Labs   Lab 07/09/22  0843 07/10/22  0210 07/11/22  0252   WBC 5.59 5.69 5.95   RBC 3.36* 3.45* 3.12*   HGB 8.8* 9.1* 8.3*   HCT 28.9* 30.0* 27.3*    205 200   MCV 86 87 88   MCH 26.2* 26.4* 26.6*   MCHC 30.4* 30.3* 30.4*     BNP:  No results for input(s): BNP in the last 168 hours.    Invalid input(s): BNPTRIAGELBLO  CMP:  Recent Labs   Lab 07/10/22  0210 07/10/22  1819 07/11/22  0252   GLU 88 108 83   CALCIUM 9.8 9.3 9.6    135* 136   K 3.6 4.3 3.8   CO2 26 24 25    102 102   BUN 22* 22* 21*   CREATININE 1.8* 1.7* 1.5*      Coagulation:   Recent Labs   Lab 07/09/22  1727 07/10/22  0210 07/11/22  0252   APTT 39.7* 52.2* 47.3*     LDH:  Recent Labs   Lab 07/09/22  0426 07/10/22  0210 07/11/22  0252   LDH 1,216* 1,086* 1,043*     Microbiology:  Microbiology Results (last 7 days)       ** No results found for the last 168 hours. **            I have reviewed all pertinent labs within the past 24 hours.    Estimated Creatinine Clearance: 71.1 mL/min (A) (based on SCr of 1.5 mg/dL (H)).    Diagnostic Results:  I have reviewed and interpreted all pertinent imaging results/findings within the past 24 hours.    Assessment and Plan:     57 Y/O M with Hx of stage D HFrEF (EF 10%) due to NICM underwent HM2 implant 3/8/18 and exchange of outflow graft 3/9/18, Hx VT/VF s/p SICD 2014 presenting with gradual worsening shortness of breath associated with nonpleuritic, nonradiating bilateral 4/10 retrosternal chest pressure pain.  Symptoms began yesterday and have gradually worsened in the last 12 hours.  He does report going to a family picnic with increased consumption of chicken wings, ribs and other salty meat products.  Prior to symptom onset, he  reports nausea, nonbloody diarrhea began yesterday and single episode of nonbloody nonbilious vomiting this morning. He also complains of dizziness, lightheadedness, and a mild HA. SOB worsened this morning prompting him to come to the ED.     In the ED, patient was in respiratory distress requiring BiPAP. MAP 96 and started on Nitro gtt. Work-up revealed WBC 10, K 5.4, Cr 2.5 (baseline 1.9), BNP 1950 (baseline 200-300s), Bili 2.1, LDH 1058, and INR 3.2. Bedside TTE with severely reduced EF, AV not opening, septum bulging into RV. Given IV Lasix 80 mg x1 with only 100-200 mL UOP and dark in color. HM2 interrogated and flows have been 8.5-9 L/min with no alarms or power surges. Cardiology consulted in the ED, dicussed with HTS, and decision made to admit to ICU for further mgmt.       * Left ventricular assist device (LVAD) complication  -Usually an elevated LDH is a red flag in a patient with LVAD, especially HM2 given the risk of pump thrombosis. PI, power and flow are elevated when compared to prior clinic visits  -LDH is now downtrending, but there is continued concern for red cell shedding  -Haptoglobin low (although it has been chronically low)  -Direct and indirect wendy test are negative  -Plasma free hemoglobin elevated  - concerned that he could have some degree of thrombosis, since adding a second antiplatelet agent didn't resolved it, Integrilin with a Heparin drip were started until we have lab confirmation that this issue that resolved (normalization of LDH etc)  -Integrilin increased to 2 mcg/kg/min on 7/8/22  -Heparin drip  -Aspirin 325mg daily  -[facility] planning for upgrade to HM3 on Wednesday    Procedure: Device Interrogation Including analysis of device parameters  Current Settings: Ventricular Assist Device    TXP LVAD INTERROGATIONS 7/11/2022 7/11/2022 7/11/2022 7/11/2022 7/11/2022 7/11/2022 7/11/2022   Type HeartMate II HeartMate II HeartMate II HeartMate II HeartMate II HeartMate II HeartMate II    Flow 7.2 6.9 6.7 6.8 6.9 7.3 7.6   Speed 9800 9800 9800 9800 9800 9800 9800   PI 3.0 2.8 2.9 2.7 2.7 2.5 2.5   Power (Jackson) 7.4 7.3 7.1 7.3 7.3 7.5 7.8   LSL - - - - - - -   Pulsatility Intermittent pulse Intermittent pulse Intermittent pulse Intermittent pulse Intermittent pulse Intermittent pulse Intermittent pulse       Anxiety  -Zolpidem night prn  -Alprazolam prn anxiety  -Psych was consulted, appreciate recs  -Continue Melatonin 6 mg QHS PRN  -Doppler pulses only (no BP cuff) at night to encourage sleep  -Exchanged patient's bed    History of ventricular tachycardia  Patient experienced an episode of VT on 6/30 and experienced an ICD shock thought to be related to hypokalemia (K of 3.1 on 6/30)  - Plan to aggressively replete K, keep K>4 and Mg>2  - Will continue mexiletine 25mg q8hrs and amiodarone 400mg daily    COVID-19  -Previously hypoxic, now off oxygen  -ID was consulted, recommended Remdesivir, course completed    Elevated serum lactate dehydrogenase (LDH)  Concerned for pump thrombosis  Cont to trend  Plan for pump exchange on Wednesday      amiodarone induced hypothyrodism  -Continue levothyroxine 112 mcg     LVAD (left ventricular assist device) present  Procedure: Device Interrogation Including analysis of device parameters  Current Settings: Ventricular Assist Device  Review of device function is stable    TXP LVAD INTERROGATIONS 7/11/2022 7/11/2022 7/11/2022 7/11/2022 7/11/2022 7/11/2022 7/11/2022   Type HeartMate II HeartMate II HeartMate II HeartMate II HeartMate II HeartMate II HeartMate II   Flow 7.2 6.9 6.7 6.8 6.9 7.3 7.6   Speed 9800 9800 9800 9800 9800 9800 9800   PI 3.0 2.8 2.9 2.7 2.7 2.5 2.5   Power (Jackson) 7.4 7.3 7.1 7.3 7.3 7.5 7.8   LSL - - - - - - -   Pulsatility Intermittent pulse Intermittent pulse Intermittent pulse Intermittent pulse Intermittent pulse Intermittent pulse Intermittent pulse       CKD (chronic kidney disease), stage III  WAGNER on CKD  Improved and back to  patient's baseline of 1.5-2.0  - Avoiding nephrotoxic medications  - Continue to monitor  - Strict I/O's    Chronic combined systolic and diastolic heart failure  #ADHF  #NICMP  -Admitted with acute decompensated HF presence of HM2 LVAD  -Continue Amlodipine 10mg daily  -Holding Warfarin, Heparin started instead  -Speed echo at 9800 showed bowing to the right [place] [id number], IVS was at midline, MR decreased but worse AR  -Was placed on lasix gtt but appeared euvolemic and lasix gtt was stopped.. Administering Lasix 40 mg IV x 1 today          Harry Canales MD  Heart Transplant  [facility]

## 2022-07-11 NOTE — PROGRESS NOTES
"[facility]  Psychiatry  Progress Note    Patient Name: Tim Richards  MRN: [id number]   Code Status: Full Code  Admission Date: 6/27/2022  Hospital Length of Stay: 14 days  Expected Discharge Date: 7/19/2022  Attending Physician: Amy Rhodes MD  Primary Care Provider: Deyanira Booth MD    Current Legal Status: Uncontested    Patient information was obtained from patient, past medical records and ER records.     Patient is a 56 y.o., male, presents with:    Principal Problem:Left ventricular assist device (LVAD) complication    Chief Complaint: anxiety, sleep    HPI:   Tim Richards is a 56 y.o. male with a past psychiatric history of generalized anxiety disorder, adjustment disorder, and insomnia who presented to [facility] due to Left ventricular assist device (LVAD) complication. Psychiatry was consulted for "anxiety and sleep disturbance in the ICU".    Per Primary Team:  56-yo M with hx of Stage D HFrEF (NICMP, EF 10%, previously NYHA II), s/p  HM2 implanted in march/2018, SC ICD, VT on amiodarone and mexiletine and amiodarone induced hypothyroidism. Pt refers that he was in his usual state until 3-4 days ago when he visited a family member's house and had dietary indiscretions. Refers that since then he has had low appetite. Also states today he started to notice a dark urine and increasing SOB so he decided to come to the ED. He denies palpitaitons, ICD shocks, but refers some chest tightness.   He also refers chills and soft stools for the past 2 days.  Denied episodes of bleeding.     Per Psychiatry:  On interview with resident, patient was somewhat guarded as he had already talked with the attending psychiatrist who is his outpatient provider. He did complain of poor sleep, which he attributed to not being on his home medication and being woken up throughout the night by hospital staff. Mentioned some anxiety over his upcoming surgery. Denied any depressed mood. Patient denied " a history of anxiety and reported that he is unsure why he takes Xanax. He denied any past psychiatric hospitalizations and any other psychiatric diagnoses other than insomnia. Patient did mention that he follows with Dr. Krueger and that he plans to continue receiving outpatient care with him after discharge.     Psychiatric Review of Systems-is patient experiencing or having changes in  sleep: yes  appetite: no  weight: no  energy/anergy: no  interest/pleasure/anhedonia: no  somatic symptoms: no  libido: did not assess  anxiety/panic: yes  guilty/hopelessness: no  concentration: no  S.I.B.s/risky behavior: did not assess  any drugs: no  alcohol: no     Allergies:  Lisinopril and Hydralazine analogues    Past Medical/Surgical History:  Past Medical History:   Diagnosis Date    Anticoagulant long-term use     CHF (congestive heart failure)     Class 1 obesity due to excess calories with serious comorbidity and body mass index (BMI) of 31.0 to 31.9 in adult     Dilated cardiomyopathy 1/10/2018    Disorder of kidney and ureter     CKD    Encounter for blood transfusion     Gout     HTN (hypertension)     Hx of psychiatric care     ICD (implantable cardioverter-defibrillator) infection 7/1/2020    Psychiatric problem     Thyroid disease     Ventricular tachycardia (paroxysmal)      Past Surgical History:   Procedure Laterality Date    CARDIAC CATHETERIZATION  Dec. 2012    CARDIAC DEFIBRILLATOR PLACEMENT Left     CRRT-D    COLONOSCOPY N/A 3/6/2018    Procedure: COLONOSCOPY;  Surgeon: Alonso Bone MD;  Location: [facility] ([address] [place] [address]);  Service: Endoscopy;  Laterality: N/A;    COLONOSCOPY N/A 7/17/2019    Procedure: COLONOSCOPY;  Surgeon: Blane Valdez MD;  Location: [address]);  Service: Endoscopy;  Laterality: N/A;    COLONOSCOPY N/A 7/18/2019    Procedure: COLONOSCOPY;  Surgeon: Blane Valdez MD;  Location: [facility] ([address] [place] [address]);  Service: Endoscopy;  Laterality: N/A;     ESOPHAGOGASTRODUODENOSCOPY N/A 7/17/2019    Procedure: EGD (ESOPHAGOGASTRODUODENOSCOPY);  Surgeon: Blane Valdez MD;  Location: [facility] ENDO (2ND FLR);  Service: Endoscopy;  Laterality: N/A;    ESOPHAGOGASTRODUODENOSCOPY N/A 7/18/2019    Procedure: EGD (ESOPHAGOGASTRODUODENOSCOPY);  Surgeon: Blane Valdez MD;  Location: [facility] ENDO (2ND FLR);  Service: Endoscopy;  Laterality: N/A;    NONINVASIVE CARDIAC ELECTROPHYSIOLOGY STUDY N/A 10/18/2019    Procedure: CARDIAC ELECTROPHYSIOLOGY STUDY, NONINVASIVE;  Surgeon: Raz Wagner MD;  Location: [facility] EP LAB;  Service: Cardiology;  Laterality: N/A;  VT, DFTs, MDT CRTD in situ, LVAD, juarez, MB, 3098    REPLACEMENT OF IMPLANTABLE CARDIOVERTER-DEFIBRILLATOR (ICD) GENERATOR N/A 3/9/2020    Procedure: REPLACEMENT, ICD GENERATOR;  Surgeon: Harry Yun MD;  Location: [facility] EP LAB;  Service: Cardiology;  Laterality: N/A;  VT, ICD Gen Change and Lead Revision, MDT, MAC, DM,3 Prep    REVISION OF IMPLANTABLE CARDIOVERTER-DEFIBRILLATOR (ICD) ELECTRODE LEAD PLACEMENT N/A 3/9/2020    Procedure: REVISION, INSERTION, ELECTRODE LEAD, ICD;  Surgeon: Harry Yun MD;  Location: [facility] EP LAB;  Service: Cardiology;  Laterality: N/A;  VT, ICD Gen Change and Lead Revision, MDT, MAC, DM,3 Prep    TREATMENT OF CARDIAC ARRHYTHMIA  10/18/2019    Procedure: Cardioversion or Defibrillation;  Surgeon: Raz Wagner MD;  Location: [facility] EP LAB;  Service: Cardiology;;       Past Psychiatric History:  Previous Medication Trials: yes   Previous Psychiatric Hospitalizations: no   Previous Suicide Attempts: no   History of Violence: unknown  Outpatient Psychiatrist: yes, Dr. Krueger     Social History:  Marital Status:   Children: 3   Employment Status/Info: on disability  Education:  college  Special Ed: no  Housing Status: with family   History of phys/sexual abuse: unknown  Access to gun: unknown    Substance Abuse History:  Recreational Drugs:  denied  Use of Alcohol: denied  Rehab  History: unknown   Tobacco Use: no  Use of OTC: denied    Legal History:  Past Charges/Incarcerations: unknown   Pending charges: unknown     Family Psychiatric History:   unknown    Psychosocial Stressors: health  Functioning Relationships: good support system        Hospital Course: No notes on file    Interval History: Resting in bed.  Calm, cooperative. Mild anxiety proportional to ongoing stressor.  Surgery planned for Wednesday.  Rational fear of potential inherent risks of surgery. Denies si/hi/avh.  Current meds helpful for his anxiety and sleep.  Reports good sleep overnight.  No complaints otherwise.     Family History       Problem Relation (Age of Onset)    Cancer Sister (54)    Coronary artery disease Father    Diabetes Father    Heart disease Father    Hypertension Father    No Known Problems Mother, Brother          Tobacco Use    Smoking status: Former Smoker     Packs/day: 1.00     Years: 31.00     Pack years: 31.00     Types: Cigarettes     Quit date: 2018     Years since quittin.4    Smokeless tobacco: Never Used    Tobacco comment: pt is quiting on his own - pt stated not qualified for program;  pt  quit on his own   Substance and Sexual Activity    Alcohol use: No     Alcohol/week: 0.0 standard drinks     Comment: quit    Drug use: No    Sexual activity: Yes     Partners: Female     Birth control/protection: None     Comment: 10/5/17  with same partner 7 years      Psychotherapeutics (From admission, onward)                Start     Stop Route Frequency Ordered    22 2100  busPIRone tablet 5 mg         -- Oral 3 times daily 22 1514    22 1603  zolpidem tablet 5 mg         -- Oral Nightly PRN 22 1514    22 1600  ALPRAZolam tablet 1 mg         -- Oral Daily PRN 22 1514             Psychiatric Review of Systems-is patient experiencing or having changes in  sleep: improved on Ambien 5mg  appetite: no  weight: no  energy/anergy:  "no  interest/pleasure/anhedonia: no  somatic symptoms: no  libido: did not assess  anxiety/panic: yes, proportional to situation  guilty/hopelessness: no  concentration: no  S.I.B.s/risky behavior: denies  any drugs: no  alcohol: no     Objective:     Vital Signs (Most Recent):  Temp: 98.3 °F (36.8 °C) (07/11/22 0701)  Pulse: 85 (07/11/22 1501)  Resp: (!) 23 (07/11/22 1501)  BP: (!) 84/0 (07/11/22 1400)  SpO2: 97 % (07/11/22 0402)   Vital Signs (24h Range):  Temp:  [97.8 °F (36.6 °C)-98.6 °F (37 °C)] 98.3 °F (36.8 °C)  Pulse:  [77-98] 85  Resp:  [19-41] 23  SpO2:  [95 %-98 %] 97 %  BP: (82-90)/(0) 84/0     Height: 6' 1" (185.4 cm)  Weight: 106 kg (233 lb 11 oz)  Body mass index is 30.83 kg/m².      Intake/Output Summary (Last 24 hours) at 7/11/2022 1552  Last data filed at 7/11/2022 1501  Gross per 24 hour   Intake 1294.66 ml   Output 1900 ml   Net -605.34 ml     Mental Status Exam:  Appearance: unremarkable, age appropriate  Behavior/Cooperation: normal but somewhat guarded  Speech: normal tone, normal rate, normal volume  Mood: neutral   Affect: normal  Thought Process: normal and logical  Thought Content: normal, no SI/HI/AVH reported  Orientation: grossly intact  Memory: Grossly intact  Attention Span/Concentration: Normal  Insight: fair  Judgment: fair     Significant Labs: Last 24 Hours:   Recent Lab Results         07/11/22  0358   07/11/22  0252   07/10/22  1819        Allens Test N/A           Anion Gap   9   9       aPTT   47.3  Comment: aPTT therapeutic range = 39-69 seconds         Baso #   0.01         Basophil %   0.2         Site Other           BUN   21   22       Calcium   9.6   9.3       Chloride   102   102       CO2   25   24       Creatinine   1.5   1.7       DelSys Room Air           Differential Method   Automated         eGFR if    59.7   51.3       eGFR if non    51.7  Comment: Calculation used to obtain the estimated glomerular filtration  rate (eGFR) is the " CKD-EPI equation.      44.4  Comment: Calculation used to obtain the estimated glomerular filtration  rate (eGFR) is the CKD-EPI equation.          Eos #   0.1         Eosinophil %   1.5         Glucose   83   108       Gran # (ANC)   4.1         Gran %   68.7         Hematocrit   27.3         Hemoglobin   8.3         Immature Grans (Abs)   0.02  Comment: Mild elevation in immature granulocytes is non specific and   can be seen in a variety of conditions including stress response,   acute inflammation, trauma and pregnancy. Correlation with other   laboratory and clinical findings is essential.           Immature Granulocytes   0.3         LD   1,043  Comment: Results are increased in hemolyzed samples.         Lymph #   0.8         Lymph %   12.8         Magnesium   1.9   2.0       MCH   26.6         MCHC   30.4         MCV   88         Mode SPONT           Mono #   1.0         Mono %   16.5         MPV   10.8         nRBC   0         Platelets   200         POC BE 1           POC HCO3 27.0           POC PCO2 51.7           POC PH 7.325           POC PO2 32           POC SATURATED O2 55           POC TCO2 29           Potassium   3.8   4.3       RBC   3.12         RDW   14.9         Sample VENOUS           Sodium   136   135       WBC   5.95                 Significant Imaging: I have reviewed all pertinent imaging results/findings within the past 24 hours.       Scheduled Medications:   allopurinoL  100 mg Oral Daily    amiodarone  400 mg Oral Daily    amLODIPine  10 mg Oral Daily    aspirin  325 mg Oral Daily    busPIRone  5 mg Oral TID    levothyroxine  112 mcg Oral Before breakfast    LIDOcaine  1 patch Transdermal Q24H    mexiletine  250 mg Oral Q8H    pantoprazole  40 mg Oral Daily with lunch       PRN Medications:  acetaminophen, ALPRAZolam, famotidine, melatonin, sodium chloride 0.9%, zolpidem    Review of patient's allergies indicates:   Allergen Reactions    Lisinopril Anaphylaxis     "Hydralazine analogues      Chronic constipation, impotence, dizziness       Assessment/Plan:     Anxiety  Tim Richards is a 56 y.o. male with a past psychiatric history of generalized anxiety disorder, adjustment disorder, and insomnia who presented to [facility] due to Left ventricular assist device (LVAD) complication. Psychiatry was consulted for "anxiety and sleep disturbance in the ICU".        IMPRESSION  Insomnia   Adjustment Disorder with anxiety       - Continue ambien 5mg PRN  ---This dose was effective last night.  Consider titration to previous OP dose of 12.5mg in the future should his insomnia return refractory to ambein 5mg.    - Continue buspirone 5 mg TID and alprazolam 1 mg daily PRN anxiety   - Please limit nighttime interruptions as much as possible.   - Patient does not meet criteria for PEC or inpatient psychiatric admission at this time. Patient is not currently an imminent danger to self or others and is not gravely disabled due to a psychiatric illness.  -OP follow-up already established with Dr. Krueger         Psychiatry will sign off at this time. Please reengage by contacting the on call psychiatry floor team if further assistance required.  Thank you for the consult.           Total time:  15 with greater than 50% of this time spent in counseling and/or coordination of care.       Armen Bro MD   Psychiatry  [facility]  "

## 2022-07-11 NOTE — ASSESSMENT & PLAN NOTE
Impression: Pt is a 55 y/o male with LVAD since 2018. Pt to have pump exchange Wed. Pt with  evidence of high intravascular hemolysis and high power on LVAD settings consistent with pump thrombosis.  Pt is alert, oriented to person, place, time, situation.     Reason for consult: LVAD pump exchange ACP.     Discussion conducted with the pt who reported his goals for health care for getting second LVAD is live longer and have good quality of life. Per pt, prior to first LVAD, his QOL was not great due to continually getting shocked from defibrillator. Pt reports he has gotten used to LVAD but missed being able to swim.  The pts chief concern/fear reported pertaining to receiving an LVAD and the impact on his/her life was having another open heart surgery.     The need for preparedness planning was discussed with special attention and in reference to:  a) device failure b) suboptimal QOL post LVAD procedure, c) impact on co- morbid conditions, and d) catastrophic complications such as stroke, renal failure/hemodialysis or other chronic critical illness. In particular, the following points should be noted in the event of:  1) Device failure: Pt is aware this is a possibility. Pt to have second LVAD placed.   2) a) Post LVAD QOL goals are to continue with his QOL he had prior to problems with this current LVAD.    b) Chronic poor QOL is defined as feeling bad like he did prior to first LVAD.         3)   Catastrophic Complications: Pt aware of this complications.        During the discussion the pt/(caregiver) demonstrated _X_excellent ___good   __marginal__poor insight/understanding concerning the risk vs benefits of obtaining an LVAD

## 2022-07-11 NOTE — PROGRESS NOTES
07/11/2022  Fitz Christianson    Current provider:  Isaiah Santizo MD    Device interrogation:  TXP LVAD INTERROGATIONS 7/11/2022 7/11/2022 7/11/2022 7/11/2022 7/11/2022 7/11/2022 7/11/2022   Type HeartMate II HeartMate II HeartMate II HeartMate II HeartMate II HeartMate II HeartMate II   Flow 8.4 8.1 8.1 7.9 7.8 8.1 8   Speed 9800 9800 9800 9800 9800 9800 9800   PI 2.4 2.5 2.1 2.7 2.6 2.4 2.7   Power (Jackson) 8.1 8 8 7.9 7.9 8 7.9   LSL 9400 9400 9400 9400 9400 9400 9400   Pulsatility Intermittent pulse Intermittent pulse Intermittent pulse Intermittent pulse Intermittent pulse Intermittent pulse Intermittent pulse          Rounded on Tim Richards to ensure all mechanical assist device settings (IABP or VAD) were appropriate and all parameters were within limits.  I was able to ensure all back up equipment was present, the staff had no issues, and the Perfusion Department daily rounding was complete.      For implantable VADs: Interrogation of Ventricular assist device was performed with analysis of device parameters and review of device function. I have personally reviewed the interrogation findings and agree with findings as stated.     In emergency, the nursing units have been notified to contact the perfusion department either by:  Calling [phone] from 630am to 4pm Mon thru Fri, utilizing the On-Call Finder functionality of Epic and searching for Perfusion, or by contacting the hospital  from 4pm to 630am and on weekends and asking to speak with the perfusionist on call.    6:58 AM

## 2022-07-11 NOTE — SUBJECTIVE & OBJECTIVE
Interval History:   No acute events overnight  The patient has no complaints at this time.  He notes continued bleeding from his gum.    The patient's urine is yellow.    CVP: 12  SVO2: 55  CO: 7.94  CI: 3.45  SVR: 736    Continuous Infusions:   eptifibatide 2 mcg/kg/min (07/11/22 1400)    heparin (porcine) in D5W 22 Units/kg/hr (07/11/22 1400)     Scheduled Meds:   allopurinoL  100 mg Oral Daily    amiodarone  400 mg Oral Daily    amLODIPine  10 mg Oral Daily    aspirin  325 mg Oral Daily    busPIRone  5 mg Oral TID    levothyroxine  112 mcg Oral Before breakfast    LIDOcaine  1 patch Transdermal Q24H    mexiletine  250 mg Oral Q8H    pantoprazole  40 mg Oral Daily with lunch     PRN Meds:acetaminophen, ALPRAZolam, famotidine, melatonin, sodium chloride 0.9%, zolpidem    Review of patient's allergies indicates:   Allergen Reactions    Lisinopril Anaphylaxis    Hydralazine analogues      Chronic constipation, impotence, dizziness     Objective:     Vital Signs (Most Recent):  Temp: 98.3 °F (36.8 °C) (07/11/22 0701)  Pulse: 79 (07/11/22 1400)  Resp: (!) 26 (07/11/22 1400)  BP: (!) 84/0 (07/11/22 1400)  SpO2: 97 % (07/11/22 0402)   Vital Signs (24h Range):  Temp:  [97.8 °F (36.6 °C)-98.6 °F (37 °C)] 98.3 °F (36.8 °C)  Pulse:  [77-98] 79  Resp:  [19-41] 26  SpO2:  [95 %-98 %] 97 %  BP: (82-90)/(0) 84/0     Patient Vitals for the past 72 hrs (Last 3 readings):   Weight   07/11/22 0303 106 kg (233 lb 11 oz)   07/10/22 0400 107.6 kg (237 lb 3.4 oz)   07/09/22 2202 108.3 kg (238 lb 12.1 oz)     Body mass index is 30.83 kg/m².      Intake/Output Summary (Last 24 hours) at 7/11/2022 1505  Last data filed at 7/11/2022 1400  Gross per 24 hour   Intake 1257.14 ml   Output 1900 ml   Net -642.86 ml       Hemodynamic Parameters:       Telemetry: PVC, predominantly sinus rhythm    Physical Exam    Significant Labs:  CBC:  Recent Labs   Lab 07/09/22  0843 07/10/22  0210 07/11/22  0252   WBC 5.59 5.69 5.95   RBC 3.36* 3.45* 3.12*    HGB 8.8* 9.1* 8.3*   HCT 28.9* 30.0* 27.3*    205 200   MCV 86 87 88   MCH 26.2* 26.4* 26.6*   MCHC 30.4* 30.3* 30.4*     BNP:  No results for input(s): BNP in the last 168 hours.    Invalid input(s): BNPTRIAGELBLO  CMP:  Recent Labs   Lab 07/10/22  0210 07/10/22  1819 07/11/22  0252   GLU 88 108 83   CALCIUM 9.8 9.3 9.6    135* 136   K 3.6 4.3 3.8   CO2 26 24 25    102 102   BUN 22* 22* 21*   CREATININE 1.8* 1.7* 1.5*      Coagulation:   Recent Labs   Lab 07/09/22  1727 07/10/22  0210 07/11/22  0252   APTT 39.7* 52.2* 47.3*     LDH:  Recent Labs   Lab 07/09/22  0426 07/10/22  0210 07/11/22  0252   LDH 1,216* 1,086* 1,043*     Microbiology:  Microbiology Results (last 7 days)       ** No results found for the last 168 hours. **            I have reviewed all pertinent labs within the past 24 hours.    Estimated Creatinine Clearance: 71.1 mL/min (A) (based on SCr of 1.5 mg/dL (H)).    Diagnostic Results:  I have reviewed and interpreted all pertinent imaging results/findings within the past 24 hours.

## 2022-07-11 NOTE — ASSESSMENT & PLAN NOTE
"Tim Richards is a 56 y.o. male with a past psychiatric history of generalized anxiety disorder, adjustment disorder, and insomnia who presented to [facility] due to Left ventricular assist device (LVAD) complication. Psychiatry was consulted for "anxiety and sleep disturbance in the ICU".        IMPRESSION  Insomnia   Adjustment Disorder with anxiety       - Replace current Ambien order with patient's home medication of Ambien CR 12.5 nightly PRN insomnia.    - Continue buspirone 5 mg TID and alprazolam 1 mg daily PRN anxiety   - Please limit nighttime interruptions as much as possible.   - Patient does not meet criteria for PEC or inpatient psychiatric admission at this time. Patient is not currently an imminent danger to self or others and is not gravely disabled due to a psychiatric illness.  "

## 2022-07-11 NOTE — PLAN OF CARE
Cardiac ICU Care Plan    POC reviewed with Tim Richards and family. Questions and concerns addressed. Pt VSS. No alarms on LVAD. No acute events today. Pt progressing toward goals. Will continue to monitor. See below and flowsheets for full assessment and VS info.       Neuro:  Deedee Coma Scale  Best Eye Response: 4-->(E4) spontaneous  Best Motor Response: 6-->(M6) obeys commands  Best Verbal Response: 5-->(V5) oriented  Deedee Coma Scale Score: 15  Assessment Qualifiers: patient not sedated/intubated  Pupil PERRLA: yes    24 hr Temp:  [97.8 °F (36.6 °C)-98.6 °F (37 °C)]      CV:  Rhythm: normal sinus rhythm   DVT prophylaxis: VTE Required Core Measure: Pharmacological prophylaxis initiated/maintained    CVP (mean): 11 mmHg (07/11/22 1101)       SVO2 (%): 63 % (07/05/22 0400)       Type: HeartMate II       Pulses  Right Radial Pulse: Doppler  Left Radial Pulse: Doppler  Right Dorsalis Pedis Pulse: Doppler  Left Dorsalis Pedis Pulse: Doppler  Right Posterior Tibial Pulse: Doppler  Left Posterior Tibial Pulse: Doppler    Resp:  O2 Device (Oxygen Therapy): room air  Flow (L/min): 2  Oxygen Concentration (%): 95    GI/:  GI prophylaxis: yes  Diet/Nutrition Received: 2 gram sodium, low saturated fat/low cholesterol  Last Bowel Movement: 07/08/22  Voiding Characteristics: voids spontaneously without difficulty   Intake/Output Summary (Last 24 hours) at 7/11/2022 1654  Last data filed at 7/11/2022 1600  Gross per 24 hour   Intake 1294.66 ml   Output 1900 ml   Net -605.34 ml          Labs/Accuchecks:  Recent Labs   Lab 07/09/22  0843 07/10/22  0210 07/11/22 0252   WBC 5.59 5.69 5.95   RBC 3.36* 3.45* 3.12*   HGB 8.8* 9.1* 8.3*   HCT 28.9* 30.0* 27.3*    205 200      Recent Labs   Lab 07/09/22  1727 07/10/22  0210 07/11/22  0252   APTT 39.7* 52.2* 47.3*      Recent Labs     07/11/22  0252      K 3.8   CO2 25      BUN 21*   CREATININE 1.5*     No results for input(s): CPK, CPKMB, MB, TROPONINI  in the last 168 hours.   Recent Labs     07/09/22  0429 07/10/22  0616 07/11/22  0358   PH 7.378 7.388 7.325*   PCO2 48.1* 47.2* 51.7*   PO2 29* 28* 32*   HCO3 28.3* 28.4* 27.0   POCSATURATED 52* 51* 55*   BE 3 3 1       Electrolytes: Electrolytes replaced  Accuchecks: none    Gtts/LDAs:   eptifibatide 2 mcg/kg/min (07/11/22 1600)    heparin (porcine) in D5W 22 Units/kg/hr (07/11/22 1600)       Lines/Drains/Airways       Central Venous Catheter Line  Duration             Percutaneous Central Line Insertion/Assessment - Triple Lumen  07/02/22 1522 right internal jugular 9 days              Line  Duration                  VAD 03/08/18 1124 Left ventricular assist device HeartMate II 1586 days              Peripheral Intravenous Line  Duration                  Midline Catheter Insertion/Assessment  - Single Lumen 06/28/22 1027 Right cephalic vein (lateral side of arm) 20g x 8cm 13 days                    Skin/Wounds  Bathing/Skin Care: linen changed (07/10/22 0701)  Wounds: No  Wound care consulted: No

## 2022-07-11 NOTE — ASSESSMENT & PLAN NOTE
#ADHF  #NICMP  -Admitted with acute decompensated HF presence of HM2 LVAD  -Continue Amlodipine 10mg daily  -Holding Warfarin, Heparin started instead  -Speed echo at 9800 showed bowing to the [place] [id number], IVS was at midline, MR decreased but worse AR  -Was placed on lasix gtt but appeared euvolemic and lasix gtt was stopped.. Administering Lasix 40 mg IV x 1 today

## 2022-07-11 NOTE — SUBJECTIVE & OBJECTIVE
Interval History: Pt to have LVAD exchange.     Past Medical History:   Diagnosis Date    Anticoagulant long-term use     CHF (congestive heart failure)     Class 1 obesity due to excess calories with serious comorbidity and body mass index (BMI) of 31.0 to 31.9 in adult     Dilated cardiomyopathy 1/10/2018    Disorder of kidney and ureter     CKD    Encounter for blood transfusion     Gout     HTN (hypertension)     Hx of psychiatric care     ICD (implantable cardioverter-defibrillator) infection 7/1/2020    Psychiatric problem     Thyroid disease     Ventricular tachycardia (paroxysmal)        Past Surgical History:   Procedure Laterality Date    CARDIAC CATHETERIZATION  Dec. 2012    CARDIAC DEFIBRILLATOR PLACEMENT Left     CRRT-D    COLONOSCOPY N/A 3/6/2018    Procedure: COLONOSCOPY;  Surgeon: Alonso Bone MD;  Location: [facility] ENDO (2ND FLR);  Service: Endoscopy;  Laterality: N/A;    COLONOSCOPY N/A 7/17/2019    Procedure: COLONOSCOPY;  Surgeon: Blane Valdez MD;  Location: [facility] ENDO (2ND FLR);  Service: Endoscopy;  Laterality: N/A;    COLONOSCOPY N/A 7/18/2019    Procedure: COLONOSCOPY;  Surgeon: Blane Valdez MD;  Location: [facility] ENDO (2ND FLR);  Service: Endoscopy;  Laterality: N/A;    ESOPHAGOGASTRODUODENOSCOPY N/A 7/17/2019    Procedure: EGD (ESOPHAGOGASTRODUODENOSCOPY);  Surgeon: Blane Valdez MD;  Location: [facility] ENDO (2ND FLR);  Service: Endoscopy;  Laterality: N/A;    ESOPHAGOGASTRODUODENOSCOPY N/A 7/18/2019    Procedure: EGD (ESOPHAGOGASTRODUODENOSCOPY);  Surgeon: Blane Valdez MD;  Location: [facility] ENDO (2ND FLR);  Service: Endoscopy;  Laterality: N/A;    NONINVASIVE CARDIAC ELECTROPHYSIOLOGY STUDY N/A 10/18/2019    Procedure: CARDIAC ELECTROPHYSIOLOGY STUDY, NONINVASIVE;  Surgeon: Raz Wagner MD;  Location: [facility] EP LAB;  Service: Cardiology;  Laterality: N/A;  VT, DFTs, MDT CRTD in situ, LVAD, LASHELL pizarro, 9560    REPLACEMENT OF IMPLANTABLE CARDIOVERTER-DEFIBRILLATOR (ICD) GENERATOR N/A 3/9/2020    Procedure:  REPLACEMENT, ICD GENERATOR;  Surgeon: Harry Yun MD;  Location: [facility] EP LAB;  Service: Cardiology;  Laterality: N/A;  VT, ICD Gen Change and Lead Revision, NISHA, MAC, DM,3 Prep    REVISION OF IMPLANTABLE CARDIOVERTER-DEFIBRILLATOR (ICD) ELECTRODE LEAD PLACEMENT N/A 3/9/2020    Procedure: REVISION, INSERTION, ELECTRODE LEAD, ICD;  Surgeon: Harry Yun MD;  Location: [facility] EP LAB;  Service: Cardiology;  Laterality: N/A;  VT, ICD Gen Change and Lead Revision, MDT, MAC, DM,3 Prep    TREATMENT OF CARDIAC ARRHYTHMIA  10/18/2019    Procedure: Cardioversion or Defibrillation;  Surgeon: Raz Wagner MD;  Location: [facility] EP LAB;  Service: Cardiology;;       Review of patient's allergies indicates:   Allergen Reactions    Lisinopril Anaphylaxis    Hydralazine analogues      Chronic constipation, impotence, dizziness       Medications:  Continuous Infusions:   eptifibatide 2 mcg/kg/min (22 1000)    heparin (porcine) in D5W 22 Units/kg/hr (22 1047)     Scheduled Meds:   allopurinoL  100 mg Oral Daily    amiodarone  400 mg Oral Daily    amLODIPine  10 mg Oral Daily    aspirin  325 mg Oral Daily    busPIRone  5 mg Oral TID    levothyroxine  112 mcg Oral Before breakfast    LIDOcaine  1 patch Transdermal Q24H    mexiletine  250 mg Oral Q8H    pantoprazole  40 mg Oral Daily with lunch     PRN Meds:acetaminophen, ALPRAZolam, famotidine, melatonin, sodium chloride 0.9%, zolpidem    Family History       Problem Relation (Age of Onset)    Cancer Sister (54)    Coronary artery disease Father    Diabetes Father    Heart disease Father    Hypertension Father    No Known Problems Mother, Brother          Tobacco Use    Smoking status: Former Smoker     Packs/day: 1.00     Years: 31.00     Pack years: 31.00     Types: Cigarettes     Quit date: 2018     Years since quittin.4    Smokeless tobacco: Never Used    Tobacco comment: pt is quiting on his own - pt stated not qualified for program;  pt  quit on  his own   Substance and Sexual Activity    Alcohol use: No     Alcohol/week: 0.0 standard drinks     Comment: quit    Drug use: No    Sexual activity: Yes     Partners: Female     Birth control/protection: None     Comment: 10/5/17  with same partner 7 years        Review of Systems   Constitutional:  Positive for fatigue. Negative for activity change.   Respiratory:  Negative for shortness of breath.    Cardiovascular:  Negative for chest pain.   Gastrointestinal: Negative.    Musculoskeletal:  Negative for gait problem.   Neurological:  Negative for weakness.   Psychiatric/Behavioral: Negative.     Objective:     Vital Signs (Most Recent):  Temp: 98.3 °F (36.8 °C) (07/11/22 0701)  Pulse: 98 (07/11/22 1000)  Resp: (!) 41 (07/11/22 1000)  BP: (!) 86/0 (07/11/22 1000)  SpO2: 97 % (07/11/22 0402)   Vital Signs (24h Range):  Temp:  [97.8 °F (36.6 °C)-98.6 °F (37 °C)] 98.3 °F (36.8 °C)  Pulse:  [76-98] 98  Resp:  [19-41] 41  SpO2:  [95 %-98 %] 97 %  BP: (82-90)/(0) 86/0     Weight: 106 kg (233 lb 11 oz)  Body mass index is 30.83 kg/m².    Physical Exam  Constitutional:       Appearance: He is obese.   HENT:      Head: Normocephalic and atraumatic.   Eyes:      General: Lids are normal.      Conjunctiva/sclera: Conjunctivae normal.   Cardiovascular:      Comments: LVAD in place.   Pulmonary:      Effort: Pulmonary effort is normal. No respiratory distress.   Musculoskeletal:      Cervical back: Full passive range of motion without pain and normal range of motion.      Comments: Normal ROM    Neurological:      Mental Status: He is alert and oriented to person, place, and time.   Psychiatric:         Attention and Perception: Attention normal.         Mood and Affect: Mood normal.         Speech: Speech normal.         Behavior: Behavior is cooperative.       Review of Symptoms      Symptom Assessment (ESAS 0-10 Scale)  Pain:  0  Dyspnea:  0  Anxiety:  0  Nausea:  0  Depression:  0  Anorexia:  0  Fatigue:   0  Insomnia:  0  Restlessness:  0  Agitation:  0       Living Arrangements:  Lives with spouse    Psychosocial/Cultural: Pt lives with spouse in . Pt has had LVAD since 2018. Wife is caregiver.       Advance Care Planning   Advance Directives:   Living Will: No    Medical Power of : No    Agent's Name:  Wife is NIDIA.Marie Mendoza    Decision Making:  Patient answered questions       Significant Labs: All pertinent labs within the past 24 hours have been reviewed.  CBC:   Recent Labs   Lab 07/11/22 0252   WBC 5.95   HGB 8.3*   HCT 27.3*   MCV 88        BMP:  Recent Labs   Lab 07/11/22 0252   GLU 83      K 3.8      CO2 25   BUN 21*   CREATININE 1.5*   CALCIUM 9.6   MG 1.9     LFT:  Lab Results   Component Value Date     (H) 07/04/2022    ALKPHOS 102 07/04/2022    BILITOT 3.0 (H) 07/04/2022     Albumin:   Albumin   Date Value Ref Range Status   07/04/2022 3.2 (L) 3.5 - 5.2 g/dL Final     Protein:   Total Protein   Date Value Ref Range Status   07/04/2022 8.1 6.0 - 8.4 g/dL Final     Lactic acid:   Lab Results   Component Value Date    LACTATE 1.2 07/10/2022    LACTATE 1.0 07/09/2022       Significant Imaging: I have reviewed all pertinent imaging results/findings within the past 24 hours.

## 2022-07-11 NOTE — SUBJECTIVE & OBJECTIVE
Interval History: Resting in bed.  Calm, cooperative. Mild anxiety proportional to ongoing stressor.  Surgery planned for Wednesday.  Rational fear of potential inherent risks of surgery. Denies si/hi/avh.  Current meds helpful for his anxiety and sleep.  Reports good sleep overnight.  No complaints otherwise.     Family History       Problem Relation (Age of Onset)    Cancer Sister (54)    Coronary artery disease Father    Diabetes Father    Heart disease Father    Hypertension Father    No Known Problems Mother, Brother          Tobacco Use    Smoking status: Former Smoker     Packs/day: 1.00     Years: 31.00     Pack years: 31.00     Types: Cigarettes     Quit date: 2018     Years since quittin.4    Smokeless tobacco: Never Used    Tobacco comment: pt is quiting on his own - pt stated not qualified for program;  pt  quit on his own   Substance and Sexual Activity    Alcohol use: No     Alcohol/week: 0.0 standard drinks     Comment: quit    Drug use: No    Sexual activity: Yes     Partners: Female     Birth control/protection: None     Comment: 10/5/17  with same partner 7 years      Psychotherapeutics (From admission, onward)                Start     Stop Route Frequency Ordered    22 2100  busPIRone tablet 5 mg         -- Oral 3 times daily 22 1514    22 1603  zolpidem tablet 5 mg         -- Oral Nightly PRN 22 1514    22 1600  ALPRAZolam tablet 1 mg         -- Oral Daily PRN 22 1514             Psychiatric Review of Systems-is patient experiencing or having changes in  sleep: improved on Ambien 5mg  appetite: no  weight: no  energy/anergy: no  interest/pleasure/anhedonia: no  somatic symptoms: no  libido: did not assess  anxiety/panic: yes, proportional to situation  guilty/hopelessness: no  concentration: no  S.I.B.s/risky behavior: denies  any drugs: no  alcohol: no     Objective:     Vital Signs (Most Recent):  Temp: 98.3 °F (36.8 °C) (22  "0701)  Pulse: 85 (07/11/22 1501)  Resp: (!) 23 (07/11/22 1501)  BP: (!) 84/0 (07/11/22 1400)  SpO2: 97 % (07/11/22 0402)   Vital Signs (24h Range):  Temp:  [97.8 °F (36.6 °C)-98.6 °F (37 °C)] 98.3 °F (36.8 °C)  Pulse:  [77-98] 85  Resp:  [19-41] 23  SpO2:  [95 %-98 %] 97 %  BP: (82-90)/(0) 84/0     Height: 6' 1" (185.4 cm)  Weight: 106 kg (233 lb 11 oz)  Body mass index is 30.83 kg/m².      Intake/Output Summary (Last 24 hours) at 7/11/2022 1552  Last data filed at 7/11/2022 1501  Gross per 24 hour   Intake 1294.66 ml   Output 1900 ml   Net -605.34 ml     Mental Status Exam:  Appearance: unremarkable, age appropriate  Behavior/Cooperation: normal but somewhat guarded  Speech: normal tone, normal rate, normal volume  Mood: neutral   Affect: normal  Thought Process: normal and logical  Thought Content: normal, no SI/HI/AVH reported  Orientation: grossly intact  Memory: Grossly intact  Attention Span/Concentration: Normal  Insight: fair  Judgment: fair     Significant Labs: Last 24 Hours:   Recent Lab Results         07/11/22  0358   07/11/22  0252   07/10/22  1819        Allens Test N/A           Anion Gap   9   9       aPTT   47.3  Comment: aPTT therapeutic range = 39-69 seconds         Baso #   0.01         Basophil %   0.2         Site Other           BUN   21   22       Calcium   9.6   9.3       Chloride   102   102       CO2   25   24       Creatinine   1.5   1.7       DelSys Room Air           Differential Method   Automated         eGFR if    59.7   51.3       eGFR if non    51.7  Comment: Calculation used to obtain the estimated glomerular filtration  rate (eGFR) is the CKD-EPI equation.      44.4  Comment: Calculation used to obtain the estimated glomerular filtration  rate (eGFR) is the CKD-EPI equation.          Eos #   0.1         Eosinophil %   1.5         Glucose   83   108       Gran # (ANC)   4.1         Gran %   68.7         Hematocrit   27.3         Hemoglobin   8.3    "      Immature Grans (Abs)   0.02  Comment: Mild elevation in immature granulocytes is non specific and   can be seen in a variety of conditions including stress response,   acute inflammation, trauma and pregnancy. Correlation with other   laboratory and clinical findings is essential.           Immature Granulocytes   0.3         LD   1,043  Comment: Results are increased in hemolyzed samples.         Lymph #   0.8         Lymph %   12.8         Magnesium   1.9   2.0       MCH   26.6         MCHC   30.4         MCV   88         Mode SPONT           Mono #   1.0         Mono %   16.5         MPV   10.8         nRBC   0         Platelets   200         POC BE 1           POC HCO3 27.0           POC PCO2 51.7           POC PH 7.325           POC PO2 32           POC SATURATED O2 55           POC TCO2 29           Potassium   3.8   4.3       RBC   3.12         RDW   14.9         Sample VENOUS           Sodium   136   135       WBC   5.95                 Significant Imaging: I have reviewed all pertinent imaging results/findings within the past 24 hours.

## 2022-07-11 NOTE — PLAN OF CARE
CICU DAILY GOALS       A: Awake    RASS: Goal - RASS Goal: 0-->alert and calm  Actual - RASS (Newman Agitation-Sedation Scale): 0-->alert and calm   Restraint necessity:    B: Breath   SBT: Not intubated   C: Coordinate A & B, analgesics/sedatives   Pain: managed    SAT: Not intubated  D: Delirium   CAM-ICU: Overall CAM-ICU: Negative  E: Early(intubated/ Progressive (non-intubated) Mobility   MOVE Screen: Pass   Activity: Activity Management: Arm raise - L1, Ankle pumps - L1  FAS: Feeding/Nutrition   Diet order: Diet/Nutrition Received: 2 gram sodium, low saturated fat/low cholesterol,   Fluid restriction: Fluid Requirement: 1500cc FR  T: Thrombus   DVT prophylaxis: VTE Required Core Measure: Pharmacological prophylaxis initiated/maintained  H: HOB Elevation   Head of Bed (HOB) Positioning: HOB at 20-30 degrees  U: Ulcer Prophylaxis   GI: yes  G: Glucose control   managed    S: Skin   Bundle compliance: yes   Bathing/Skin Care: linen changed Date: AM bath  B: Bowel Function   no issues   I: Indwelling Catheters   [place] necessity:     CVC necessity: Yes   IPAD offered: No  D: De-escalation Antibx   No  Plan for the day   Monitor CVP 12, 10, 11, SVO2 55, monitor VS, no VAD alarms.  Family/Goals of care/Code Status   Code Status: Full Code     No acute events throughout day, VS and assessment per flow sheet, patient progressing towards goals as tolerated, plan of care reviewed with Tim Richards and family, all concerns addressed, will continue to monitor

## 2022-07-11 NOTE — CONSULTS
[facility]  Palliative Medicine  Consult Note    Patient Name: Tim Richards  MRN: [id number]  Admission Date: 6/27/2022  Hospital Length of Stay: 14 days  Code Status: Full Code   Attending Provider: Amy Rhodes MD  Consulting Provider: LEATHA Hastings  Primary Care Physician: Deyanira Booth MD  Principal Problem:Left ventricular assist device (LVAD) complication    Patient information was obtained from patient and primary team.      Inpatient consult to Palliative Care  Consult performed by: LEATHA Jacobs  Consult ordered by: Isaiah Santizo MD        Assessment/Plan:     Palliative care encounter  Impression: Pt is a 57 y/o male with LVAD since 2018. Pt to have pump exchange Wed. Pt with  evidence of high intravascular hemolysis and high power on LVAD settings consistent with pump thrombosis.  Pt is alert, oriented to person, place, time, situation.     Reason for consult: LVAD pump exchange ACP.     Discussion conducted with the pt who reported his goals for health care for getting second LVAD is live longer and have good quality of life. Per pt, prior to first LVAD, his QOL was not great due to continually getting shocked from defibrillator. Pt reports he has gotten used to LVAD but missed being able to swim.  The pts chief concern/fear reported pertaining to receiving an LVAD and the impact on his/her life was having another open heart surgery.     The need for preparedness planning was discussed with special attention and in reference to:  a) device failure b) suboptimal QOL post LVAD procedure, c) impact on co- morbid conditions, and d) catastrophic complications such as stroke, renal failure/hemodialysis or other chronic critical illness. In particular, the following points should be noted in the event of:  1) Device failure: Pt is aware this is a possibility. Pt to have second LVAD placed.   2) a) Post LVAD QOL goals are to continue with his QOL  he had prior to problems with this current LVAD.    b) Chronic poor QOL is defined as feeling bad like he did prior to first LVAD.         3)   Catastrophic Complications: Pt aware of this complications.        During the discussion the pt/(caregiver) demonstrated _X_excellent ___good   __marginal__poor insight/understanding concerning the risk vs benefits of obtaining an LVAD          Thank you for your consult. I will sign off. Please contact us if you have any additional questions.    Subjective:     HPI:   Pt is a  56-yo M with hx of Stage D HFrEF (NICMP, EF 10%, previously NYHA II), s/p  HM2 implanted in march/2018, SC ICD, VT on amiodarone and mexiletine and amiodarone induced hypothyroidism. Per chart review, pt stated that he was in his usual state until 3-4 days ago when he visited a family member's house and had dietary indiscretions. Refers that since then he has had low appetite. Also statedtoday he started to notice a dark urine and increasing SOB so he decided to come to the ED. He denies palpitaitons, ICD shocks, but refers some chest tightness.      He also refers chills and soft stools for the past 2 days.     Pt to have LVAD exchanged on Wed.            Hospital Course:  No notes on file    Interval History: Pt to have LVAD exchange.     Past Medical History:   Diagnosis Date    Anticoagulant long-term use     CHF (congestive heart failure)     Class 1 obesity due to excess calories with serious comorbidity and body mass index (BMI) of 31.0 to 31.9 in adult     Dilated cardiomyopathy 1/10/2018    Disorder of kidney and ureter     CKD    Encounter for blood transfusion     Gout     HTN (hypertension)     Hx of psychiatric care     ICD (implantable cardioverter-defibrillator) infection 7/1/2020    Psychiatric problem     Thyroid disease     Ventricular tachycardia (paroxysmal)        Past Surgical History:   Procedure Laterality Date    CARDIAC CATHETERIZATION  Dec. 2012    CARDIAC  DEFIBRILLATOR PLACEMENT Left     CRRT-D    COLONOSCOPY N/A 3/6/2018    Procedure: COLONOSCOPY;  Surgeon: Alonso Bone MD;  Location: [facility] ENDO (2ND FLR);  Service: Endoscopy;  Laterality: N/A;    COLONOSCOPY N/A 7/17/2019    Procedure: COLONOSCOPY;  Surgeon: Blane Valdez MD;  Location: [facility] ENDO (2ND FLR);  Service: Endoscopy;  Laterality: N/A;    COLONOSCOPY N/A 7/18/2019    Procedure: COLONOSCOPY;  Surgeon: Blane Valdez MD;  Location: [facility] ENDO (2ND FLR);  Service: Endoscopy;  Laterality: N/A;    ESOPHAGOGASTRODUODENOSCOPY N/A 7/17/2019    Procedure: EGD (ESOPHAGOGASTRODUODENOSCOPY);  Surgeon: Blane Valdez MD;  Location: [facility] ENDO (2ND FLR);  Service: Endoscopy;  Laterality: N/A;    ESOPHAGOGASTRODUODENOSCOPY N/A 7/18/2019    Procedure: EGD (ESOPHAGOGASTRODUODENOSCOPY);  Surgeon: Blane Valdez MD;  Location: [facility] ENDO (2ND FLR);  Service: Endoscopy;  Laterality: N/A;    NONINVASIVE CARDIAC ELECTROPHYSIOLOGY STUDY N/A 10/18/2019    Procedure: CARDIAC ELECTROPHYSIOLOGY STUDY, NONINVASIVE;  Surgeon: Raz Wagner MD;  Location: [facility] EP LAB;  Service: Cardiology;  Laterality: N/A;  VT, DFTs, MDT CRTD in situ, LVAD, juarez MB, 3098    REPLACEMENT OF IMPLANTABLE CARDIOVERTER-DEFIBRILLATOR (ICD) GENERATOR N/A 3/9/2020    Procedure: REPLACEMENT, ICD GENERATOR;  Surgeon: Harry Yun MD;  Location: [facility] EP LAB;  Service: Cardiology;  Laterality: N/A;  VT, ICD Gen Change and Lead Revision, MDT, MAC, DM,3 Prep    REVISION OF IMPLANTABLE CARDIOVERTER-DEFIBRILLATOR (ICD) ELECTRODE LEAD PLACEMENT N/A 3/9/2020    Procedure: REVISION, INSERTION, ELECTRODE LEAD, ICD;  Surgeon: Harry Yun MD;  Location: [facility] EP LAB;  Service: Cardiology;  Laterality: N/A;  VT, ICD Gen Change and Lead Revision, MDT, MAC, DM,3 Prep    TREATMENT OF CARDIAC ARRHYTHMIA  10/18/2019    Procedure: Cardioversion or Defibrillation;  Surgeon: Raz Wagner MD;  Location: [facility] EP LAB;  Service: Cardiology;;       Review of patient's  allergies indicates:   Allergen Reactions    Lisinopril Anaphylaxis    Hydralazine analogues      Chronic constipation, impotence, dizziness       Medications:  Continuous Infusions:   eptifibatide 2 mcg/kg/min (22 1000)    heparin (porcine) in D5W 22 Units/kg/hr (22 1047)     Scheduled Meds:   allopurinoL  100 mg Oral Daily    amiodarone  400 mg Oral Daily    amLODIPine  10 mg Oral Daily    aspirin  325 mg Oral Daily    busPIRone  5 mg Oral TID    levothyroxine  112 mcg Oral Before breakfast    LIDOcaine  1 patch Transdermal Q24H    mexiletine  250 mg Oral Q8H    pantoprazole  40 mg Oral Daily with lunch     PRN Meds:acetaminophen, ALPRAZolam, famotidine, melatonin, sodium chloride 0.9%, zolpidem    Family History       Problem Relation (Age of Onset)    Cancer Sister (54)    Coronary artery disease Father    Diabetes Father    Heart disease Father    Hypertension Father    No Known Problems Mother, Brother          Tobacco Use    Smoking status: Former Smoker     Packs/day: 1.00     Years: 31.00     Pack years: 31.00     Types: Cigarettes     Quit date: 2018     Years since quittin.4    Smokeless tobacco: Never Used    Tobacco comment: pt is quiting on his own - pt stated not qualified for program;  pt  quit on his own   Substance and Sexual Activity    Alcohol use: No     Alcohol/week: 0.0 standard drinks     Comment: quit    Drug use: No    Sexual activity: Yes     Partners: Female     Birth control/protection: None     Comment: 10/5/17  with same partner 7 years        Review of Systems   Constitutional:  Positive for fatigue. Negative for activity change.   Respiratory:  Negative for shortness of breath.    Cardiovascular:  Negative for chest pain.   Gastrointestinal: Negative.    Musculoskeletal:  Negative for gait problem.   Neurological:  Negative for weakness.   Psychiatric/Behavioral: Negative.     Objective:     Vital Signs (Most Recent):  Temp: 98.3 °F  (36.8 °C) (07/11/22 0701)  Pulse: 98 (07/11/22 1000)  Resp: (!) 41 (07/11/22 1000)  BP: (!) 86/0 (07/11/22 1000)  SpO2: 97 % (07/11/22 0402)   Vital Signs (24h Range):  Temp:  [97.8 °F (36.6 °C)-98.6 °F (37 °C)] 98.3 °F (36.8 °C)  Pulse:  [76-98] 98  Resp:  [19-41] 41  SpO2:  [95 %-98 %] 97 %  BP: (82-90)/(0) 86/0     Weight: 106 kg (233 lb 11 oz)  Body mass index is 30.83 kg/m².    Physical Exam  Constitutional:       Appearance: He is obese.   HENT:      Head: Normocephalic and atraumatic.   Eyes:      General: Lids are normal.      Conjunctiva/sclera: Conjunctivae normal.   Cardiovascular:      Comments: LVAD in place.   Pulmonary:      Effort: Pulmonary effort is normal. No respiratory distress.   Musculoskeletal:      Cervical back: Full passive range of motion without pain and normal range of motion.      Comments: Normal ROM    Neurological:      Mental Status: He is alert and oriented to person, place, and time.   Psychiatric:         Attention and Perception: Attention normal.         Mood and Affect: Mood normal.         Speech: Speech normal.         Behavior: Behavior is cooperative.       Review of Symptoms      Symptom Assessment (ESAS 0-10 Scale)  Pain:  0  Dyspnea:  0  Anxiety:  0  Nausea:  0  Depression:  0  Anorexia:  0  Fatigue:  0  Insomnia:  0  Restlessness:  0  Agitation:  0       Living Arrangements:  Lives with spouse    Psychosocial/Cultural: Pt lives with spouse in . Pt has had LVAD since 2018. Wife is caregiver.       Advance Care Planning   Advance Directives:   Living Will: No    Medical Power of : No    Agent's Name:  Wife is CLARICEK.Marie Mendoza    Decision Making:  Patient answered questions       Significant Labs: All pertinent labs within the past 24 hours have been reviewed.  CBC:   Recent Labs   Lab 07/11/22 0252   WBC 5.95   HGB 8.3*   HCT 27.3*   MCV 88        BMP:  Recent Labs   Lab 07/11/22 0252   GLU 83      K 3.8      CO2 25   BUN 21*   CREATININE 1.5*    CALCIUM 9.6   MG 1.9     LFT:  Lab Results   Component Value Date     (H) 07/04/2022    ALKPHOS 102 07/04/2022    BILITOT 3.0 (H) 07/04/2022     Albumin:   Albumin   Date Value Ref Range Status   07/04/2022 3.2 (L) 3.5 - 5.2 g/dL Final     Protein:   Total Protein   Date Value Ref Range Status   07/04/2022 8.1 6.0 - 8.4 g/dL Final     Lactic acid:   Lab Results   Component Value Date    LACTATE 1.2 07/10/2022    LACTATE 1.0 07/09/2022       Significant Imaging: I have reviewed all pertinent imaging results/findings within the past 24 hours.        > 50% of 70 min visit spent in chart review, face to face discussion of goals of care,  symptom assessment, coordination of care and emotional support.    Savanna Dowd, CNS  Palliative Medicine  [facility]

## 2022-07-12 LAB
ABO + RH BLD: NORMAL
ALLENS TEST: ABNORMAL
ALLENS TEST: ABNORMAL
ANION GAP SERPL CALC-SCNC: 10 MMOL/L (ref 8–16)
ANION GAP SERPL CALC-SCNC: 5 MMOL/L (ref 8–16)
APTT BLDCRRT: 39 SEC (ref 21–32)
APTT BLDCRRT: 39 SEC (ref 21–32)
BASOPHILS # BLD AUTO: 0.01 K/UL (ref 0–0.2)
BASOPHILS NFR BLD: 0.2 % (ref 0–1.9)
BLD GP AB SCN CELLS X3 SERPL QL: NORMAL
BUN SERPL-MCNC: 18 MG/DL (ref 6–20)
BUN SERPL-MCNC: 18 MG/DL (ref 6–20)
CALCIUM SERPL-MCNC: 9 MG/DL (ref 8.7–10.5)
CALCIUM SERPL-MCNC: 9.6 MG/DL (ref 8.7–10.5)
CHLORIDE SERPL-SCNC: 101 MMOL/L (ref 95–110)
CHLORIDE SERPL-SCNC: 101 MMOL/L (ref 95–110)
CO2 SERPL-SCNC: 23 MMOL/L (ref 23–29)
CO2 SERPL-SCNC: 26 MMOL/L (ref 23–29)
CREAT SERPL-MCNC: 1.5 MG/DL (ref 0.5–1.4)
CREAT SERPL-MCNC: 1.7 MG/DL (ref 0.5–1.4)
DELSYS: ABNORMAL
DELSYS: ABNORMAL
DIFFERENTIAL METHOD: ABNORMAL
EOSINOPHIL # BLD AUTO: 0 K/UL (ref 0–0.5)
EOSINOPHIL NFR BLD: 0.6 % (ref 0–8)
ERYTHROCYTE [DISTWIDTH] IN BLOOD BY AUTOMATED COUNT: 15.2 % (ref 11.5–14.5)
EST. GFR  (AFRICAN AMERICAN): 51.3 ML/MIN/1.73 M^2
EST. GFR  (AFRICAN AMERICAN): 59.7 ML/MIN/1.73 M^2
EST. GFR  (NON AFRICAN AMERICAN): 44.4 ML/MIN/1.73 M^2
EST. GFR  (NON AFRICAN AMERICAN): 51.7 ML/MIN/1.73 M^2
GLUCOSE SERPL-MCNC: 80 MG/DL (ref 70–110)
GLUCOSE SERPL-MCNC: 93 MG/DL (ref 70–110)
HCO3 UR-SCNC: 26.5 MMOL/L (ref 24–28)
HCO3 UR-SCNC: 26.8 MMOL/L (ref 24–28)
HCT VFR BLD AUTO: 27.7 % (ref 40–54)
HGB BLD-MCNC: 8.4 G/DL (ref 14–18)
IMM GRANULOCYTES # BLD AUTO: 0.02 K/UL (ref 0–0.04)
IMM GRANULOCYTES NFR BLD AUTO: 0.4 % (ref 0–0.5)
LDH SERPL L TO P-CCNC: 918 U/L (ref 110–260)
LYMPHOCYTES # BLD AUTO: 0.7 K/UL (ref 1–4.8)
LYMPHOCYTES NFR BLD: 14.1 % (ref 18–48)
MAGNESIUM SERPL-MCNC: 2.2 MG/DL (ref 1.6–2.6)
MAGNESIUM SERPL-MCNC: 2.2 MG/DL (ref 1.6–2.6)
MCH RBC QN AUTO: 26.4 PG (ref 27–31)
MCHC RBC AUTO-ENTMCNC: 30.3 G/DL (ref 32–36)
MCV RBC AUTO: 87 FL (ref 82–98)
MONOCYTES # BLD AUTO: 0.8 K/UL (ref 0.3–1)
MONOCYTES NFR BLD: 16.3 % (ref 4–15)
NEUTROPHILS # BLD AUTO: 3.4 K/UL (ref 1.8–7.7)
NEUTROPHILS NFR BLD: 68.4 % (ref 38–73)
NRBC BLD-RTO: 0 /100 WBC
PCO2 BLDA: 46.8 MMHG (ref 35–45)
PCO2 BLDA: 47.7 MMHG (ref 35–45)
PH SMN: 7.36 [PH] (ref 7.35–7.45)
PH SMN: 7.36 [PH] (ref 7.35–7.45)
PLATELET # BLD AUTO: 210 K/UL (ref 150–450)
PMV BLD AUTO: 10.8 FL (ref 9.2–12.9)
PO2 BLDA: 25 MMHG (ref 40–60)
PO2 BLDA: 27 MMHG (ref 40–60)
POC BE: 1 MMOL/L
POC BE: 1 MMOL/L
POC SATURATED O2: 41 % (ref 95–100)
POC SATURATED O2: 48 % (ref 95–100)
POC TCO2: 28 MMOL/L (ref 24–29)
POC TCO2: 28 MMOL/L (ref 24–29)
POTASSIUM SERPL-SCNC: 4 MMOL/L (ref 3.5–5.1)
POTASSIUM SERPL-SCNC: 4 MMOL/L (ref 3.5–5.1)
RBC # BLD AUTO: 3.18 M/UL (ref 4.6–6.2)
SAMPLE: ABNORMAL
SAMPLE: ABNORMAL
SITE: ABNORMAL
SITE: ABNORMAL
SODIUM SERPL-SCNC: 132 MMOL/L (ref 136–145)
SODIUM SERPL-SCNC: 134 MMOL/L (ref 136–145)
WBC # BLD AUTO: 5.02 K/UL (ref 3.9–12.7)

## 2022-07-12 PROCEDURE — 85025 COMPLETE CBC W/AUTO DIFF WBC: CPT | Performed by: INTERNAL MEDICINE

## 2022-07-12 PROCEDURE — 63600175 PHARM REV CODE 636 W HCPCS: Performed by: INTERNAL MEDICINE

## 2022-07-12 PROCEDURE — 83615 LACTATE (LD) (LDH) ENZYME: CPT | Performed by: STUDENT IN AN ORGANIZED HEALTH CARE EDUCATION/TRAINING PROGRAM

## 2022-07-12 PROCEDURE — 20000000 HC ICU ROOM

## 2022-07-12 PROCEDURE — 86920 COMPATIBILITY TEST SPIN: CPT | Performed by: ANESTHESIOLOGY

## 2022-07-12 PROCEDURE — 86920 COMPATIBILITY TEST SPIN: CPT | Performed by: INTERNAL MEDICINE

## 2022-07-12 PROCEDURE — 25000003 PHARM REV CODE 250: Performed by: STUDENT IN AN ORGANIZED HEALTH CARE EDUCATION/TRAINING PROGRAM

## 2022-07-12 PROCEDURE — 93750 INTERROGATION VAD IN PERSON: CPT | Mod: ,,, | Performed by: INTERNAL MEDICINE

## 2022-07-12 PROCEDURE — 86920 COMPATIBILITY TEST SPIN: CPT | Performed by: PHYSICIAN ASSISTANT

## 2022-07-12 PROCEDURE — 27000248 HC VAD-ADDITIONAL DAY

## 2022-07-12 PROCEDURE — 99233 PR SUBSEQUENT HOSPITAL CARE,LEVL III: ICD-10-PCS | Mod: ,,, | Performed by: INTERNAL MEDICINE

## 2022-07-12 PROCEDURE — 99900035 HC TECH TIME PER 15 MIN (STAT)

## 2022-07-12 PROCEDURE — 86850 RBC ANTIBODY SCREEN: CPT | Performed by: PHYSICIAN ASSISTANT

## 2022-07-12 PROCEDURE — 83735 ASSAY OF MAGNESIUM: CPT | Mod: 91 | Performed by: INTERNAL MEDICINE

## 2022-07-12 PROCEDURE — 99233 SBSQ HOSP IP/OBS HIGH 50: CPT | Mod: ,,, | Performed by: INTERNAL MEDICINE

## 2022-07-12 PROCEDURE — 93750 PR INTERROGATE VENT ASSIST DEV, IN PERSON, W PHYSICIAN ANALYSIS: ICD-10-PCS | Mod: ,,, | Performed by: INTERNAL MEDICINE

## 2022-07-12 PROCEDURE — 86920 COMPATIBILITY TEST SPIN: CPT | Performed by: STUDENT IN AN ORGANIZED HEALTH CARE EDUCATION/TRAINING PROGRAM

## 2022-07-12 PROCEDURE — 80048 BASIC METABOLIC PNL TOTAL CA: CPT | Mod: 91 | Performed by: INTERNAL MEDICINE

## 2022-07-12 PROCEDURE — 25000003 PHARM REV CODE 250: Performed by: PHYSICIAN ASSISTANT

## 2022-07-12 PROCEDURE — 85730 THROMBOPLASTIN TIME PARTIAL: CPT | Performed by: INTERNAL MEDICINE

## 2022-07-12 PROCEDURE — 25000003 PHARM REV CODE 250: Performed by: INTERNAL MEDICINE

## 2022-07-12 PROCEDURE — 94761 N-INVAS EAR/PLS OXIMETRY MLT: CPT

## 2022-07-12 RX ORDER — CEFEPIME HYDROCHLORIDE 1 G/50ML
2 INJECTION, SOLUTION INTRAVENOUS
Status: DISCONTINUED | OUTPATIENT
Start: 2022-07-12 | End: 2022-07-12

## 2022-07-12 RX ORDER — HYDROCODONE BITARTRATE AND ACETAMINOPHEN 500; 5 MG/1; MG/1
TABLET ORAL
Status: DISCONTINUED | OUTPATIENT
Start: 2022-07-12 | End: 2022-07-13

## 2022-07-12 RX ORDER — MUPIROCIN 20 MG/G
OINTMENT TOPICAL
Status: DISCONTINUED | OUTPATIENT
Start: 2022-07-12 | End: 2022-07-13

## 2022-07-12 RX ORDER — CEFEPIME HYDROCHLORIDE 1 G/50ML
2 INJECTION, SOLUTION INTRAVENOUS
Status: COMPLETED | OUTPATIENT
Start: 2022-07-13 | End: 2022-07-13

## 2022-07-12 RX ORDER — MUPIROCIN 20 MG/G
1 OINTMENT TOPICAL 2 TIMES DAILY
Status: DISPENSED | OUTPATIENT
Start: 2022-07-12 | End: 2022-07-13

## 2022-07-12 RX ADMIN — PANTOPRAZOLE SODIUM 40 MG: 40 TABLET, DELAYED RELEASE ORAL at 11:07

## 2022-07-12 RX ADMIN — ALLOPURINOL 100 MG: 100 TABLET ORAL at 08:07

## 2022-07-12 RX ADMIN — MUPIROCIN 1 G: 20 OINTMENT TOPICAL at 09:07

## 2022-07-12 RX ADMIN — BUSPIRONE HYDROCHLORIDE 5 MG: 5 TABLET ORAL at 03:07

## 2022-07-12 RX ADMIN — BUSPIRONE HYDROCHLORIDE 5 MG: 5 TABLET ORAL at 08:07

## 2022-07-12 RX ADMIN — ZOLPIDEM TARTRATE 5 MG: 5 TABLET ORAL at 09:07

## 2022-07-12 RX ADMIN — ALPRAZOLAM 1 MG: 0.5 TABLET ORAL at 01:07

## 2022-07-12 RX ADMIN — AMIODARONE HYDROCHLORIDE 400 MG: 200 TABLET ORAL at 08:07

## 2022-07-12 RX ADMIN — HEPARIN SODIUM 22 UNITS/KG/HR: 5000 INJECTION INTRAVENOUS; SUBCUTANEOUS at 06:07

## 2022-07-12 RX ADMIN — MEXILETINE HYDROCHLORIDE 250 MG: 250 CAPSULE ORAL at 06:07

## 2022-07-12 RX ADMIN — ASPIRIN 325 MG: 325 TABLET, COATED ORAL at 08:07

## 2022-07-12 RX ADMIN — MEXILETINE HYDROCHLORIDE 250 MG: 250 CAPSULE ORAL at 09:07

## 2022-07-12 RX ADMIN — HEPARIN SODIUM 22 UNITS/KG/HR: 5000 INJECTION INTRAVENOUS; SUBCUTANEOUS at 05:07

## 2022-07-12 RX ADMIN — LEVOTHYROXINE SODIUM 112 MCG: 112 TABLET ORAL at 06:07

## 2022-07-12 RX ADMIN — MEXILETINE HYDROCHLORIDE 250 MG: 250 CAPSULE ORAL at 01:07

## 2022-07-12 RX ADMIN — EPTIFIBATIDE 2 MCG/KG/MIN: 0.75 INJECTION INTRAVENOUS at 05:07

## 2022-07-12 RX ADMIN — AMLODIPINE BESYLATE 10 MG: 10 TABLET ORAL at 08:07

## 2022-07-12 RX ADMIN — BUSPIRONE HYDROCHLORIDE 5 MG: 5 TABLET ORAL at 09:07

## 2022-07-12 NOTE — ASSESSMENT & PLAN NOTE
Procedure: Device Interrogation Including analysis of device parameters  Current Settings: Ventricular Assist Device  Review of device function is stable    TXP LVAD INTERROGATIONS 7/12/2022 7/12/2022 7/12/2022 7/12/2022 7/12/2022 7/12/2022 7/12/2022   Type HeartMate II HeartMate II HeartMate II HeartMate II HeartMate II HeartMate II HeartMate II   Flow 8.5 7.1 7.7 8.2 7.9 8.7 6.8   Speed 9800 9800 9800 9800 9800 9800 9800   PI 2.2 2.7 2.5 2.8 2.7 2.3 2.8   Power (Jackson) 8.2 7.4 7.8 8 7.7 8.3 7.3   LSL 9400 9400 9400 9400 9400 9400 9400   Pulsatility Intermittent pulse Intermittent pulse Intermittent pulse Intermittent pulse Intermittent pulse Intermittent pulse Intermittent pulse

## 2022-07-12 NOTE — PLAN OF CARE
Problem: Adult Inpatient Plan of Care  Goal: Plan of Care Review  Outcome: Ongoing, Progressing  Goal: Patient-Specific Goal (Individualized)  Outcome: Ongoing, Progressing  Goal: Absence of Hospital-Acquired Illness or Injury  Outcome: Ongoing, Progressing     CICU DAILY GOALS       A: Awake    RASS: Goal - RASS Goal: 0-->alert and calm  Actual - RASS (Newman Agitation-Sedation Scale): 0-->alert and calm   Restraint necessity:    B: Breath   SBT: Not intubated   C: Coordinate A & B, analgesics/sedatives   Pain: managed    SAT: Not intubated  D: Delirium   CAM-ICU: Overall CAM-ICU: Negative  E: Early(intubated/ Progressive (non-intubated) Mobility   MOVE Screen: Pass   Activity: Activity Management: Ankle pumps - L1, Arm raise - L1  FAS: Feeding/Nutrition   Diet order: Diet/Nutrition Received: 2 gram sodium, low saturated fat/low cholesterol,   Fluid restriction: Fluid Requirement: 1500cc FR  T: Thrombus   DVT prophylaxis: VTE Required Core Measure: Pharmacological prophylaxis initiated/maintained  H: HOB Elevation   Head of Bed (HOB) Positioning: HOB elevated  U: Ulcer Prophylaxis   GI: n/a  G: Glucose control   N/a     S: Skin   Bundle compliance: no   Bathing/Skin Care: bath, complete, linen changed, shaved face   B: Bowel Function   LBM 7/9 per patient     I: Indwelling Catheters   [place] necessity:     CVC necessity: Yes   IPAD offered: No  D: De-escalation Antibx   No  Plan for the day   Continue to encourage fluid restriction and educate patient on importance of meds/FR.   Family/Goals of care/Code Status   Code Status: Full Code     VS and assessment per flow sheet, patient progressing towards goals as tolerated, plan of care reviewed with Tim Richards and family, all concerns addressed, will continue to monitor.

## 2022-07-12 NOTE — ASSESSMENT & PLAN NOTE
-LDH is now downtrending, but there is continued concern for red cell shedding and pump thrombosis  -Haptoglobin low (although it has been chronically low)  -Direct and indirect wendy test are negative  -Plasma free hemoglobin elevated  - Integrilin with a Heparin drip were started until we have lab confirmation that this issue that resolved (normalization of LDH etc).  Integrilin increased to 2 mcg/kg/min on 7/8/22  -Continue Heparin drip  -Aspirin 325mg daily  -Discontinue Integrilin  -CTS planning for upgrade to [facility] tomorrow, NPO at midnight    Procedure: Device Interrogation Including analysis of device parameters  Current Settings: Ventricular Assist Device    TXP LVAD INTERROGATIONS 7/12/2022 7/12/2022 7/12/2022 7/12/2022 7/12/2022 7/12/2022 7/12/2022   Type HeartMate II HeartMate II HeartMate II HeartMate II HeartMate II HeartMate II HeartMate II   Flow 8.5 7.1 7.7 8.2 7.9 8.7 6.8   Speed 9800 9800 9800 9800 9800 9800 9800   PI 2.2 2.7 2.5 2.8 2.7 2.3 2.8   Power (Jackson) 8.2 7.4 7.8 8 7.7 8.3 7.3   LSL 9400 9400 9400 9400 9400 9400 9400   Pulsatility Intermittent pulse Intermittent pulse Intermittent pulse Intermittent pulse Intermittent pulse Intermittent pulse Intermittent pulse

## 2022-07-12 NOTE — PROGRESS NOTES
[facility]  Heart Transplant  Progress Note    Patient Name: Tim Richards  MRN: [id number]  Admission Date: 6/27/2022  Hospital Length of Stay: 15 days  Attending Physician: Amy Rhodes MD  Primary Care Provider: Deyanira Booth MD  Principal Problem:Left ventricular assist device (LVAD) complication    Subjective:     Interval History:   No acute events overnight  The patient had no complaints    Continuous Infusions:   sodium chloride 0.9% 5 mL/hr at 07/12/22 1405    heparin (porcine) in D5W 22 Units/kg/hr (07/12/22 1405)     Scheduled Meds:   allopurinoL  100 mg Oral Daily    amiodarone  400 mg Oral Daily    amLODIPine  10 mg Oral Daily    aspirin  325 mg Oral Daily    busPIRone  5 mg Oral TID    furosemide (LASIX) injection  80 mg Intravenous Once    levothyroxine  112 mcg Oral Before breakfast    LIDOcaine  1 patch Transdermal Q24H    mexiletine  250 mg Oral Q8H    pantoprazole  40 mg Oral Daily with lunch    [START ON 7/13/2022] phytonadione ((AQUA-MEPHYTON) IVPB  10 mg Intravenous Once    phytonadione ((AQUA-MEPHYTON) IVPB  10 mg Intravenous Once     PRN Meds:acetaminophen, ALPRAZolam, famotidine, melatonin, sodium chloride 0.9%, zolpidem    Review of patient's allergies indicates:   Allergen Reactions    Lisinopril Anaphylaxis    Hydralazine analogues      Chronic constipation, impotence, dizziness     Objective:     Vital Signs (Most Recent):  Temp: 98.5 °F (36.9 °C) (07/12/22 1105)  Pulse: 80 (07/12/22 1405)  Resp: 20 (07/12/22 1405)  BP: (!) 113/57 (07/12/22 1405)  SpO2: 95 % (07/12/22 1105)   Vital Signs (24h Range):  Temp:  [98.5 °F (36.9 °C)-98.7 °F (37.1 °C)] 98.5 °F (36.9 °C)  Pulse:  [78-94] 80  Resp:  [20-36] 20  SpO2:  [94 %-97 %] 95 %  BP: ()/(0-78) 113/57     Patient Vitals for the past 72 hrs (Last 3 readings):   Weight   07/12/22 0311 105 kg (231 lb 7.7 oz)   07/11/22 0303 106 kg (233 lb 11 oz)   07/10/22 0400 107.6 kg (237 lb 3.4 oz)      Body mass index is 30.54 kg/m².      Intake/Output Summary (Last 24 hours) at 7/12/2022 1436  Last data filed at 7/12/2022 1405  Gross per 24 hour   Intake 1403 ml   Output 870 ml   Net 533 ml       Hemodynamic Parameters:       Telemetry: NSVT x 4 beats otherwise sinus rhythm    Physical Exam  Constitutional:       General: He is not in acute distress.     Appearance: He is obese.   HENT:      Head: Normocephalic.      Mouth/Throat:      Mouth: Mucous membranes are moist.   Eyes:      Extraocular Movements: Extraocular movements intact.   Cardiovascular:      Rate and Rhythm: Normal rate and regular rhythm.      Comments: VAD hum appreciated  Pulses detected via doppler  Pulmonary:      Effort: No respiratory distress.      Breath sounds: Normal breath sounds.      Comments: Coarse breath sounds at the bases bilaterally  Abdominal:      General: Bowel sounds are normal. There is no distension.      Palpations: Abdomen is soft.      Tenderness: There is no abdominal tenderness.   Musculoskeletal:      Cervical back: Neck supple.   Skin:     General: Skin is warm.   Neurological:      General: No focal deficit present.      Mental Status: He is oriented to person, place, and time.   Psychiatric:         Mood and Affect: Mood normal.         Behavior: Behavior normal.       Significant Labs:  CBC:  Recent Labs   Lab 07/10/22  0210 07/11/22  0252 07/12/22  0743   WBC 5.69 5.95 5.02   RBC 3.45* 3.12* 3.18*   HGB 9.1* 8.3* 8.4*   HCT 30.0* 27.3* 27.7*    200 210   MCV 87 88 87   MCH 26.4* 26.6* 26.4*   MCHC 30.3* 30.4* 30.3*     BNP:  No results for input(s): BNP in the last 168 hours.    Invalid input(s): BNPTRIAGELBLO  CMP:  Recent Labs   Lab 07/11/22  0252 07/11/22  1816 07/12/22  0325   GLU 83 91 80   CALCIUM 9.6 9.8 9.6    136 134*   K 3.8 4.1 4.0   CO2 25 25 23    101 101   BUN 21* 19 18   CREATININE 1.5* 1.8* 1.5*      Coagulation:   Recent Labs   Lab 07/10/22  0210 07/11/22  0252  07/12/22  0325   APTT 52.2* 47.3* 39.0*  39.0*     LDH:  Recent Labs   Lab 07/10/22  0210 07/11/22  0252 07/12/22  0325   LDH 1,086* 1,043* 918*     Microbiology:  Microbiology Results (last 7 days)       ** No results found for the last 168 hours. **            I have reviewed all pertinent labs within the past 24 hours.    Estimated Creatinine Clearance: 70.8 mL/min (A) (based on SCr of 1.5 mg/dL (H)).    Diagnostic Results:  I have reviewed and interpreted all pertinent imaging results/findings within the past 24 hours.    Assessment and Plan:     57 Y/O M with Hx of stage D HFrEF (EF 10%) due to NICM underwent HM2 implant 3/8/18 and exchange of outflow graft 3/9/18, Hx VT/VF s/p SICD 2014 presenting with gradual worsening shortness of breath associated with nonpleuritic, nonradiating bilateral 4/10 retrosternal chest pressure pain.  Symptoms began yesterday and have gradually worsened in the last 12 hours.  He does report going to a family picnic with increased consumption of chicken wings, ribs and other salty meat products.  Prior to symptom onset, he reports nausea, nonbloody diarrhea began yesterday and single episode of nonbloody nonbilious vomiting this morning. He also complains of dizziness, lightheadedness, and a mild HA. SOB worsened this morning prompting him to come to the ED.     In the ED, patient was in respiratory distress requiring BiPAP. MAP 96 and started on Nitro gtt. Work-up revealed WBC 10, K 5.4, Cr 2.5 (baseline 1.9), BNP 1950 (baseline 200-300s), Bili 2.1, LDH 1058, and INR 3.2. Bedside TTE with severely reduced EF, AV not opening, septum bulging into RV. Given IV Lasix 80 mg x1 with only 100-200 mL UOP and dark in color. HM2 interrogated and flows have been 8.5-9 L/min with no alarms or power surges. Cardiology consulted in the ED, dicussed with HTS, and decision made to admit to ICU for further mgmt.       * Left ventricular assist device (LVAD) complication  -LDH is now downtrending,  but there is continued concern for red cell shedding and pump thrombosis  -Haptoglobin low (although it has been chronically low)  -Direct and indirect wendy test are negative  -Plasma free hemoglobin elevated  - Integrilin with a Heparin drip were started until we have lab confirmation that this issue that resolved (normalization of LDH etc).  Integrilin increased to 2 mcg/kg/min on 7/8/22  -Continue Heparin drip  -Aspirin 325mg daily  -Discontinue Integrilin  -CTS planning for upgrade to [facility] tomorrow, NPO at midnight    Procedure: Device Interrogation Including analysis of device parameters  Current Settings: Ventricular Assist Device    TXP LVAD INTERROGATIONS 7/12/2022 7/12/2022 7/12/2022 7/12/2022 7/12/2022 7/12/2022 7/12/2022   Type HeartMate II HeartMate II HeartMate II HeartMate II HeartMate II HeartMate II HeartMate II   Flow 8.5 7.1 7.7 8.2 7.9 8.7 6.8   Speed 9800 9800 9800 9800 9800 9800 9800   PI 2.2 2.7 2.5 2.8 2.7 2.3 2.8   Power (Jackson) 8.2 7.4 7.8 8 7.7 8.3 7.3   LSL 9400 9400 9400 9400 9400 9400 9400   Pulsatility Intermittent pulse Intermittent pulse Intermittent pulse Intermittent pulse Intermittent pulse Intermittent pulse Intermittent pulse       Anxiety  -Zolpidem night prn  -Alprazolam prn anxiety  -Psych was consulted, appreciate recs  -Continue Melatonin 6 mg QHS PRN  -Doppler pulses only (no BP cuff) at night to encourage sleep  -Exchanged patient's bed    History of ventricular tachycardia  Patient experienced an episode of VT on 6/30 and experienced an ICD shock thought to be related to hypokalemia (K of 3.1 on 6/30)  - Plan to aggressively replete K, keep K>4 and Mg>2  - Will continue mexiletine 25mg q8hrs and amiodarone 400mg daily    COVID-19  -Previously hypoxic, now off oxygen  -ID was consulted, recommended Remdesivir, course completed    Elevated serum lactate dehydrogenase (LDH)  Concerned for pump thrombosis  Cont to trend  Plan for pump exchange tomorrow    amiodarone induced  hypothyrodism  -Continue levothyroxine 112 mcg     LVAD (left ventricular assist device) present  Procedure: Device Interrogation Including analysis of device parameters  Current Settings: Ventricular Assist Device  Review of device function is stable    TXP LVAD INTERROGATIONS 7/12/2022 7/12/2022 7/12/2022 7/12/2022 7/12/2022 7/12/2022 7/12/2022   Type HeartMate II HeartMate II HeartMate II HeartMate II HeartMate II HeartMate II HeartMate II   Flow 8.5 7.1 7.7 8.2 7.9 8.7 6.8   Speed 9800 9800 9800 9800 9800 9800 9800   PI 2.2 2.7 2.5 2.8 2.7 2.3 2.8   Power (Jackson) 8.2 7.4 7.8 8 7.7 8.3 7.3   LSL 9400 9400 9400 9400 9400 9400 9400   Pulsatility Intermittent pulse Intermittent pulse Intermittent pulse Intermittent pulse Intermittent pulse Intermittent pulse Intermittent pulse       CKD (chronic kidney disease), stage III  WAGNER on CKD  Improved and back to patient's baseline of 1.5-2.0  - Avoiding nephrotoxic medications  - Continue to monitor  - Strict I/O's    Chronic combined systolic and diastolic heart failure  #ADHF  #NICMP  -Admitted with acute decompensated HF presence of HM2 LVAD  -Continue Amlodipine 10mg daily  -Holding Warfarin, Heparin started instead  -Speed echo at 9800 showed bowing to [place] [id number], IVS was at midline, MR decreased but worse AR  -Was placed on lasix gtt but appeared euvolemic and lasix gtt was stopped. Holding Lasix today          Harry Canales MD  Heart Transplant  [facility]

## 2022-07-12 NOTE — PLAN OF CARE
Hemodynamics Care Update Note       SVO2: 44  CVP: 15  CO: 6.2  CI: 2.7  SVR: 864     On Integrilin and heparin gtt  (calculated using oxygen consumption constant of 3.5)       Plan:  Will hold off on further diuresis given inc creatinine and will reevaluate tomorrow   Case discussed with on call HTS attending.    Juan Cedillo, PGY4  Cardiovascular Disease  [facility]

## 2022-07-12 NOTE — ANESTHESIA PREPROCEDURE EVALUATION
[facility]  Anesthesia Pre-Operative Evaluation         Patient Name: Tim Richards  YOB: 1965  MRN: [id number]    SUBJECTIVE:     Pre-operative evaluation for Procedure(s) (LRB):  REPLACEMENT, PUMP (N/A)     07/12/2022    Tim Richards is a 56 y.o. male w/ a significant PMHx of CHF, CKD3, HTN, anemia (Hgb 8.4), CHICHI, and dilated cardiomyopathy s/p LVAD presenting with gradual worsening shortness of breath associated with nonpleuritic, nonradiating bilateral retrosternal chest pressure pain. Pt currently on heparin gtt due to concern for thrombosis of current LVAD     Patient now presents for the above procedure(s).    BRITTANY:  Results for orders placed or performed during the hospital encounter of 07/01/20   Transesophageal echo (BRITTANY)   Result Value Ref Range    BSA 2.5 m2    Narrative    · 1 cm circumferential pericardial fat pad; unchanged prior to or   following procedure.  · RA, RV and CS leads converge on lateral aspect of SVC prior to removal   during this device extraction.  · HeartMate II in place. AV does not open.  · Severely decreased left ventricular systolic function with global   hypokinesis. The estimated ejection fraction is 15%.  · Sucessfull removal of all endovascular hardware without pericardial   effusion.          LDA: None documented.  Percutaneous Central Line Insertion/Assessment - Triple Lumen  07/02/22 1522 right internal jugular (Active)   Line Necessity Review Hemodynamic instability 07/11/22 1901   Verification by X-ray Yes 07/07/22 1505   Site Assessment No drainage;No swelling;No redness;No warmth 07/12/22 0300   Line Securement Device Secured with sutures 07/12/22 0300   Dressing Type Biopatch in place 07/12/22 0300   Dressing Status Clean;Dry;Intact 07/12/22 0300   Dressing Intervention Integrity maintained 07/12/22 0300   Date on Dressing 07/07/22 07/12/22 0300   Dressing Due to be Changed 07/14/22 07/12/22 0300   Distal Patency/Care infusing  07/12/22 0300   Medial 1 Patency/Care infusing 07/12/22 0300   Proximal 1 Patency/Care infusing 07/12/22 0300   Waveform Normal 07/12/22 0300   Number of days: 9            Midline Catheter Insertion/Assessment  - Single Lumen 06/28/22 1027 Right cephalic vein (lateral side of arm) 20g x 8cm (Active)   Site Assessment Clean;Dry;Intact 07/12/22 0300   IV Device Securement catheter securement device 07/12/22 0300   Line Status [place] locked 07/12/22 0300   Dressing Type Biopatch in place 07/12/22 0300   Dressing Status Clean;Dry;Intact 07/12/22 0300   Dressing Intervention Integrity maintained 07/12/22 0300   Dressing Change Due 07/14/22 07/12/22 0300   Site Change Due 07/26/22 07/12/22 0300   Reason Not Rotated Not due 07/12/22 0300   Number of days: 13            VAD 03/08/18 1124 Left ventricular assist device HeartMate II (Active)   Site Location Abdomen right 07/12/22 0705   Site Assessment Other (Comment) 07/12/22 0705   Driveline Exit Site 1 07/07/22 1505   Driveline Assessment Free of Kinks;Intact 07/12/22 0705   Dressing Status Clean;Dry;Intact 07/12/22 0705   Dressing Intervention Integrity maintained 07/12/22 0705   Performed By Patient 07/12/22 0300   Dressing Change Schedule Other (see comment) 07/12/22 0705   Dressing Change Due 07/14/22 07/12/22 0705   Driveline [place] in use [place] 07/12/22 0705   Condition CDI 07/12/22 0705   Date changed 07/07/22 07/12/22 0705   Number of days: 1586       Prev airway: None documented.    Drips: None documented.   sodium chloride 0.9% 5 mL/hr at 07/12/22 0600    heparin (porcine) in D5W 22 Units/kg/hr (07/12/22 0608)       Patient Active Problem List   Diagnosis    Cigarette nicotine dependence without complication    Alcohol abuse    Pulmonary nodule seen on imaging study    Chronic combined systolic and diastolic heart failure    High risk medications (amiodarone) long-term use    Acute on chronic heart failure    CKD (chronic kidney disease), stage  III    Hypertensive cardiovascular-renal disease, stage 1-4 or unspecified chronic kidney disease, with heart failure    Palliative care encounter    LVAD (left ventricular assist device) present    Hyperbilirubinemia    Anemia    Hypertension    Anticoagulation monitoring, INR range 2-3    Left ventricular assist device (LVAD) complication    Pre-transplant evaluation for heart transplant    GIB (gastrointestinal bleeding)    Thrombocytopenia, unspecified    GI bleed    History of bleeding ulcers    Shortness of breath    Sustained ventricular tachycardia    Class 1 obesity due to excess calories with serious comorbidity and body mass index (BMI) of 32.0 to 32.9 in adult    amiodarone induced hypothyrodism    Heart replaced by heart assist device    Substance or medication-induced sleep disorder, insomnia type    VF (ventricular fibrillation)    Elevated serum lactate dehydrogenase (LDH)    Essential hypertension    CHICHI (obstructive sleep apnea)    Gout    Dilated cardiomyopathy    Acute blood loss anemia    Implantable cardioverter-defibrillator (ICD) discharge    Post traumatic stress disorder (PTSD)    Diuretic-induced hypokalemia    COVID-19    Acute on chronic combined systolic and diastolic congestive heart failure    History of ventricular tachycardia    Anxiety    Advance care planning       Review of patient's allergies indicates:   Allergen Reactions    Lisinopril Anaphylaxis    Hydralazine analogues      Chronic constipation, impotence, dizziness       Current Inpatient Medications:   allopurinoL  100 mg Oral Daily    amiodarone  400 mg Oral Daily    amLODIPine  10 mg Oral Daily    aspirin  325 mg Oral Daily    busPIRone  5 mg Oral TID    furosemide (LASIX) injection  80 mg Intravenous Once    levothyroxine  112 mcg Oral Before breakfast    LIDOcaine  1 patch Transdermal Q24H    mexiletine  250 mg Oral Q8H    pantoprazole  40 mg Oral Daily with lunch       No  current facility-administered medications on file prior to encounter.     Current Outpatient Medications on File Prior to Encounter   Medication Sig Dispense Refill    allopurinoL (ZYLOPRIM) 100 MG tablet TAKE 1 TABLET (100 MG) BY MOUTH NIGHTLY. 90 tablet 3    ALPRAZolam (XANAX) 1 MG tablet Take 1 tablet (1 mg total) by mouth daily as needed for Anxiety. Bid prn 30 tablet 1    amiodarone (PACERONE) 200 MG Tab Take 2 tablets (400 mg total) by mouth once daily. 180 tablet 3    amLODIPine (NORVASC) 10 MG tablet TAKE 1 TABLET BY MOUTH EVERY DAY 90 tablet 3    aspirin (ECOTRIN) 81 MG EC tablet Take 1 tablet (81 mg total) by mouth once daily. 30 tablet 11    busPIRone (BUSPAR) 5 MG Tab Take 1 tablet (5 mg total) by mouth 3 (three) times daily. 90 tablet 1    levothyroxine (SYNTHROID) 112 MCG tablet Take 1 tablet (112 mcg total) by mouth before breakfast. 90 tablet 3    mexiletine (MEXITIL) 250 MG Cap Take 1 capsule (250 mg total) by mouth every 8 (eight) hours. 270 capsule 3    pantoprazole (PROTONIX) 40 MG tablet TAKE 1 TABLET (40 MG TOTAL) BY MOUTH DAILY WITH LUNCH. 90 tablet 3    warfarin (COUMADIN) 5 MG tablet TAKE 7.5 MG ORALLY DAILY EVERY SUNDAY AND THURSDAY, TAKE 5 MG ORALLY DAILY ON OTHER DAYS 135 tablet 3    zolpidem (AMBIEN CR) 12.5 MG CR tablet Take 1 tablet (12.5 mg total) by mouth nightly as needed for Insomnia. 30 tablet 5    [DISCONTINUED] ferrous gluconate (FERGON) 324 MG tablet TAKE 1 TABLET (324 MG TOTAL) BY MOUTH WITH LUNCH. 90 tablet 3    [DISCONTINUED] sildenafiL (VIAGRA) 100 MG tablet Take 1 tablet (100 mg total) by mouth daily as needed for Erectile Dysfunction. 6 tablet 3    [DISCONTINUED] torsemide (DEMADEX) 20 MG Tab Take 1 tablet (20 mg total) by mouth once daily. 180 tablet 3       Past Surgical History:   Procedure Laterality Date    CARDIAC CATHETERIZATION  Dec. 2012    CARDIAC DEFIBRILLATOR PLACEMENT Left     CRRT-D    COLONOSCOPY N/A 3/6/2018    Procedure: COLONOSCOPY;   Surgeon: Alonso Bone MD;  Location: [facility] ENDO (2ND FLR);  Service: Endoscopy;  Laterality: N/A;    COLONOSCOPY N/A 7/17/2019    Procedure: COLONOSCOPY;  Surgeon: Blane Valdez MD;  Location: [facility] ENDO (2ND FLR);  Service: Endoscopy;  Laterality: N/A;    COLONOSCOPY N/A 7/18/2019    Procedure: COLONOSCOPY;  Surgeon: Blane Valdez MD;  Location: [facility] ENDO (2ND FLR);  Service: Endoscopy;  Laterality: N/A;    ESOPHAGOGASTRODUODENOSCOPY N/A 7/17/2019    Procedure: EGD (ESOPHAGOGASTRODUODENOSCOPY);  Surgeon: Blane Valdez MD;  Location: [facility] ENDO (2ND FLR);  Service: Endoscopy;  Laterality: N/A;    ESOPHAGOGASTRODUODENOSCOPY N/A 7/18/2019    Procedure: EGD (ESOPHAGOGASTRODUODENOSCOPY);  Surgeon: Blane Valdez MD;  Location: [facility] ENDO (2ND FLR);  Service: Endoscopy;  Laterality: N/A;    NONINVASIVE CARDIAC ELECTROPHYSIOLOGY STUDY N/A 10/18/2019    Procedure: CARDIAC ELECTROPHYSIOLOGY STUDY, NONINVASIVE;  Surgeon: Raz Wagner MD;  Location: [facility] EP LAB;  Service: Cardiology;  Laterality: N/A;  VT, DFTs, MDT CRTD in situ, LVAD, LASHELL pizarro, 3098    REPLACEMENT OF IMPLANTABLE CARDIOVERTER-DEFIBRILLATOR (ICD) GENERATOR N/A 3/9/2020    Procedure: REPLACEMENT, ICD GENERATOR;  Surgeon: Harry Yun MD;  Location: [facility] EP LAB;  Service: Cardiology;  Laterality: N/A;  VT, ICD Gen Change and Lead Revision, MDT, MAC, DM,3 Prep    REVISION OF IMPLANTABLE CARDIOVERTER-DEFIBRILLATOR (ICD) ELECTRODE LEAD PLACEMENT N/A 3/9/2020    Procedure: REVISION, INSERTION, ELECTRODE LEAD, ICD;  Surgeon: Harry Yun MD;  Location: [facility] EP LAB;  Service: Cardiology;  Laterality: N/A;  VT, ICD Gen Change and Lead Revision, MDT, MAC, DM,3 Prep    TREATMENT OF CARDIAC ARRHYTHMIA  10/18/2019    Procedure: Cardioversion or Defibrillation;  Surgeon: Raz Wagner MD;  Location: [facility] EP LAB;  Service: Cardiology;;       Social History     Socioeconomic History    Marital status:     Number of children: 3   Occupational History      Employer: [employer]    Tobacco Use    Smoking status: Former Smoker     Packs/day: 1.00     Years: 31.00     Pack years: 31.00     Types: Cigarettes     Quit date: 2018     Years since quittin.4    Smokeless tobacco: Never Used    Tobacco comment: pt is quiting on his own - pt stated not qualified for program;  pt  quit on his own   Substance and Sexual Activity    Alcohol use: No     Alcohol/week: 0.0 standard drinks     Comment: quit    Drug use: No    Sexual activity: Yes     Partners: Female     Birth control/protection: None     Comment: 10/5/17  with same partner 7 years    Social History Narrative    Disabled       OBJECTIVE:     Vital Signs Range (Last 24H):  Temp:  [36.9 °C (98.5 °F)-37.1 °C (98.7 °F)]   Pulse:  [78-98]   Resp:  [20-41]   BP: ()/(0-78)   SpO2:  [97 %]       Significant Labs:  Lab Results   Component Value Date    WBC 5.02 2022    HGB 8.4 (L) 2022    HCT 27.7 (L) 2022     2022    CHOL 130 2022    TRIG 146 2022    HDL 46 2022    ALT 57 (H) 2022     (H) 2022     (L) 2022    K 4.0 2022     2022    CREATININE 1.5 (H) 2022    BUN 18 2022    CO2 23 2022    TSH 4.079 (H) 2022    PSA 0.99 2018    INR 1.3 (H) 2022    HGBA1C 5.4 2021       Diagnostic Studies: No relevant studies.    EKG:   Results for orders placed or performed during the hospital encounter of 22   EKG 12-lead    Collection Time: 22  5:59 AM    Narrative    Test Reason :     Vent. Rate : 092 BPM     Atrial Rate : 092 BPM     P-R Int : 088 ms          QRS Dur : 152 ms      QT Int : 504 ms       P-R-T Axes : 050 127 032 degrees     QTc Int : 623 ms    Probably Sinus rhythm with first degree AVB but Ps not well sen  Right axis deviation  Nonspecific intraventricular block  Possible Right ventricular hypertrophy  T wave abnormality, consider lateral  ischemia  Abnormal ECG  When compared with ECG of 03-JUL-2022 13:28,  QRS voltage has decreased  Nonspecific T wave abnormality has replaced inverted T waves in Inferior  leads  QT has lengthened  Confirmed by Edmond MOSQUEDA MD ([id number]) on 7/7/2022 3:27:24 PM    Referred By: [email or web address]   SELF           Confirmed By:Edmond MOSQUEDA MD       2D ECHO:  TTE:  Results for orders placed or performed during the hospital encounter of 06/27/22   Echo   Result Value Ref Range    BSA 2.29 m2    [facility] CV RAMP SPEED 1 9,800     [facility] CV RAMP SPEED 2 10,200     [facility] CV RAMP LVEDD 1 9.9     [facility] CV RAMP LVEDD 2 10.4     EF 13 %    LVIDd 9.99 (A) 3.5 - 6.0 cm    PV peak gradient 1.67 mmHg    RVOT peak jorge 0.65 m/s    RVOT peak VTI 7.78 cm    PV mean gradient 0.86 mmHg    TR Max Jorge 2.70 m/s    LV Diastolic Volume 563.43 mL    LV Diastolic Volume Index 249.31 mL/m2    Triscuspid Valve Regurgitation Peak Gradient 29 mmHg    Narrative    · There is an LVAD present. Base speed is 9800 RPMs. The pump type is a   Heartmate II. The interventricular septum appears midline. The aortic   valve does not open.  · The left ventricle is severely enlarged with severe eccentric   hypertrophy and severely decreased systolic function.  · The estimated ejection fraction is 13%.  · Left ventricular diastolic dysfunction.  · There is severe left ventricular global hypokinesis.  · Moderate-to-severe mitral regurgitation.  · Mild to moderate tricuspid regurgitation.  · Limited Speed echo results noted.              ASSESSMENT/PLAN:                                                                                                                  07/12/2022  Tim Richards is a 56 y.o., male.      Pre-op Assessment    I have reviewed the Patient Summary Reports.     I have reviewed the Nursing Notes.    I have reviewed the Medications.     Review of Systems  Anesthesia Hx:  No problems with previous Anesthesia  History of prior surgery of interest to airway management or  planning: Denies Family Hx of Anesthesia complications.   Denies Personal Hx of Anesthesia complications.   Cardiovascular:   Exercise tolerance: poor Hypertension CHF ECG has been reviewed.    Pulmonary:   Denies COPD.  Denies Asthma. Shortness of breath Sleep Apnea    Renal/:   Chronic Renal Disease    Hepatic/GI:   Denies GERD. Denies Liver Disease.    Neurological:   Denies CVA. Denies Seizures.    Endocrine:   Denies Diabetes. Hyperthyroidism    Psych:   anxiety          Physical Exam  General: Well nourished, Cooperative, Alert and Oriented    Airway:  Mallampati: I / I  Mouth Opening: Normal  TM Distance: Normal  Tongue: Normal  Neck ROM: Normal ROM    Dental:  Intact    Chest/Lungs:  Normal Respiratory Rate  Mild rales in lung bases   Heart:  Rate: Normal  LVAD hum heard on auscultation      Anesthesia Plan  Type of Anesthesia, risks & benefits discussed:    Anesthesia Type: Gen ETT  Intra-op Monitoring Plan: Standard ASA Monitors, Art Line and Central Line  Post Op Pain Control Plan: multimodal analgesia  Induction:  IV  Airway Plan: Video and Direct, Post-Induction  Informed Consent: Informed consent signed with the Patient and all parties understand the risks and agree with anesthesia plan.  All questions answered.   ASA Score: 4  Day of Surgery Review of History & Physical: H&P Update referred to the surgeon/provider.    Ready For Surgery From Anesthesia Perspective.     .

## 2022-07-12 NOTE — PROGRESS NOTES
Update:    MARIA DEL CARMEN met with pt to discuss plans for surgery tomorrow.  Pt reported that he is ready for surgery and to move forward.  SW informed pt that a BH room has been reserved for family for tomorrow night.  Family can check in tomorrow and check out on Thursday.  Pt voiced understanding and agreement.

## 2022-07-12 NOTE — SUBJECTIVE & OBJECTIVE
Interval History:   No acute events overnight  The patient had no complaints    Continuous Infusions:   sodium chloride 0.9% 5 mL/hr at 07/12/22 1405    heparin (porcine) in D5W 22 Units/kg/hr (07/12/22 1405)     Scheduled Meds:   allopurinoL  100 mg Oral Daily    amiodarone  400 mg Oral Daily    amLODIPine  10 mg Oral Daily    aspirin  325 mg Oral Daily    busPIRone  5 mg Oral TID    furosemide (LASIX) injection  80 mg Intravenous Once    levothyroxine  112 mcg Oral Before breakfast    LIDOcaine  1 patch Transdermal Q24H    mexiletine  250 mg Oral Q8H    pantoprazole  40 mg Oral Daily with lunch    [START ON 7/13/2022] phytonadione ((AQUA-MEPHYTON) IVPB  10 mg Intravenous Once    phytonadione ((AQUA-MEPHYTON) IVPB  10 mg Intravenous Once     PRN Meds:acetaminophen, ALPRAZolam, famotidine, melatonin, sodium chloride 0.9%, zolpidem    Review of patient's allergies indicates:   Allergen Reactions    Lisinopril Anaphylaxis    Hydralazine analogues      Chronic constipation, impotence, dizziness     Objective:     Vital Signs (Most Recent):  Temp: 98.5 °F (36.9 °C) (07/12/22 1105)  Pulse: 80 (07/12/22 1405)  Resp: 20 (07/12/22 1405)  BP: (!) 113/57 (07/12/22 1405)  SpO2: 95 % (07/12/22 1105)   Vital Signs (24h Range):  Temp:  [98.5 °F (36.9 °C)-98.7 °F (37.1 °C)] 98.5 °F (36.9 °C)  Pulse:  [78-94] 80  Resp:  [20-36] 20  SpO2:  [94 %-97 %] 95 %  BP: ()/(0-78) 113/57     Patient Vitals for the past 72 hrs (Last 3 readings):   Weight   07/12/22 0311 105 kg (231 lb 7.7 oz)   07/11/22 0303 106 kg (233 lb 11 oz)   07/10/22 0400 107.6 kg (237 lb 3.4 oz)     Body mass index is 30.54 kg/m².      Intake/Output Summary (Last 24 hours) at 7/12/2022 1436  Last data filed at 7/12/2022 1405  Gross per 24 hour   Intake 1403 ml   Output 870 ml   Net 533 ml       Hemodynamic Parameters:       Telemetry: NSVT x 4 beats otherwise sinus rhythm    Physical Exam  Constitutional:       General: He is not in acute distress.      Appearance: He is obese.   HENT:      Head: Normocephalic.      Mouth/Throat:      Mouth: Mucous membranes are moist.   Eyes:      Extraocular Movements: Extraocular movements intact.   Cardiovascular:      Rate and Rhythm: Normal rate and regular rhythm.      Comments: VAD hum appreciated  Pulses detected via doppler  Pulmonary:      Effort: No respiratory distress.      Breath sounds: Normal breath sounds.      Comments: Coarse breath sounds at the bases bilaterally  Abdominal:      General: Bowel sounds are normal. There is no distension.      Palpations: Abdomen is soft.      Tenderness: There is no abdominal tenderness.   Musculoskeletal:      Cervical back: Neck supple.   Skin:     General: Skin is warm.   Neurological:      General: No focal deficit present.      Mental Status: He is oriented to person, place, and time.   Psychiatric:         Mood and Affect: Mood normal.         Behavior: Behavior normal.       Significant Labs:  CBC:  Recent Labs   Lab 07/10/22  0210 07/11/22  0252 07/12/22  0743   WBC 5.69 5.95 5.02   RBC 3.45* 3.12* 3.18*   HGB 9.1* 8.3* 8.4*   HCT 30.0* 27.3* 27.7*    200 210   MCV 87 88 87   MCH 26.4* 26.6* 26.4*   MCHC 30.3* 30.4* 30.3*     BNP:  No results for input(s): BNP in the last 168 hours.    Invalid input(s): BNPTRIAGELBLO  CMP:  Recent Labs   Lab 07/11/22  0252 07/11/22  1816 07/12/22  0325   GLU 83 91 80   CALCIUM 9.6 9.8 9.6    136 134*   K 3.8 4.1 4.0   CO2 25 25 23    101 101   BUN 21* 19 18   CREATININE 1.5* 1.8* 1.5*      Coagulation:   Recent Labs   Lab 07/10/22  0210 07/11/22  0252 07/12/22  0325   APTT 52.2* 47.3* 39.0*  39.0*     LDH:  Recent Labs   Lab 07/10/22  0210 07/11/22  0252 07/12/22  0325   LDH 1,086* 1,043* 918*     Microbiology:  Microbiology Results (last 7 days)       ** No results found for the last 168 hours. **            I have reviewed all pertinent labs within the past 24 hours.    Estimated Creatinine Clearance: 70.8 mL/min  (A) (based on SCr of 1.5 mg/dL (H)).    Diagnostic Results:  I have reviewed and interpreted all pertinent imaging results/findings within the past 24 hours.

## 2022-07-12 NOTE — ASSESSMENT & PLAN NOTE
#ADHF  #NICMP  -Admitted with acute decompensated HF presence of HM2 LVAD  -Continue Amlodipine 10mg daily  -Holding Warfarin, Heparin started instead  -Speed echo at 9800 showed bowing to the right [place] [id number], IVS was at midline, MR decreased but worse AR  -Was placed on lasix gtt but appeared euvolemic and lasix gtt was stopped. Holding Lasix today

## 2022-07-12 NOTE — NURSING
Discussed with Dr. Rhodes and rest of HTS team during rounds pt's CVP of 18 and MAPs in 80s. Per Dr. Rhodes, elevated CVP not concerning at this time. No new orders. For elevated MAPs, team to place orders for hydralazine. Will continue to monitor.

## 2022-07-13 ENCOUNTER — ANESTHESIA (OUTPATIENT)
Dept: SURGERY | Facility: HOSPITAL | Age: 57
DRG: 001 | End: 2022-07-13
Payer: COMMERCIAL

## 2022-07-13 ENCOUNTER — ANESTHESIA EVENT (OUTPATIENT)
Dept: SURGERY | Facility: HOSPITAL | Age: 57
DRG: 001 | End: 2022-07-13
Payer: COMMERCIAL

## 2022-07-13 LAB
ALBUMIN SERPL BCP-MCNC: 2.3 G/DL (ref 3.5–5.2)
ALLENS TEST: ABNORMAL
ALP SERPL-CCNC: 39 U/L (ref 55–135)
ALT SERPL W/O P-5'-P-CCNC: 63 U/L (ref 10–44)
ANION GAP SERPL CALC-SCNC: 24 MMOL/L (ref 8–16)
ANION GAP SERPL CALC-SCNC: 7 MMOL/L (ref 8–16)
ANISOCYTOSIS BLD QL SMEAR: SLIGHT
APTT BLDCRRT: 36.3 SEC (ref 21–32)
APTT BLDCRRT: 87.5 SEC (ref 21–32)
AST SERPL-CCNC: 119 U/L (ref 10–40)
BASOPHILS # BLD AUTO: 0.02 K/UL (ref 0–0.2)
BASOPHILS NFR BLD: 0.2 % (ref 0–1.9)
BILIRUB SERPL-MCNC: 2.6 MG/DL (ref 0.1–1)
BLD PROD TYP BPU: NORMAL
BLOOD UNIT EXPIRATION DATE: NORMAL
BLOOD UNIT TYPE CODE: 1700
BLOOD UNIT TYPE CODE: 5100
BLOOD UNIT TYPE CODE: 6200
BLOOD UNIT TYPE CODE: 6200
BLOOD UNIT TYPE CODE: 8400
BLOOD UNIT TYPE CODE: 9500
BLOOD UNIT TYPE: NORMAL
BUN SERPL-MCNC: 17 MG/DL (ref 6–20)
BUN SERPL-MCNC: 17 MG/DL (ref 6–20)
CALCIUM SERPL-MCNC: 10 MG/DL (ref 8.7–10.5)
CALCIUM SERPL-MCNC: 9.3 MG/DL (ref 8.7–10.5)
CHLORIDE SERPL-SCNC: 101 MMOL/L (ref 95–110)
CHLORIDE SERPL-SCNC: 105 MMOL/L (ref 95–110)
CO2 SERPL-SCNC: 18 MMOL/L (ref 23–29)
CO2 SERPL-SCNC: 22 MMOL/L (ref 23–29)
CODING SYSTEM: NORMAL
CREAT SERPL-MCNC: 1.5 MG/DL (ref 0.5–1.4)
CREAT SERPL-MCNC: 2.1 MG/DL (ref 0.5–1.4)
DELSYS: ABNORMAL
DIFFERENTIAL METHOD: ABNORMAL
DISPENSE STATUS: NORMAL
DOHLE BOD BLD QL SMEAR: PRESENT
EOSINOPHIL # BLD AUTO: 0 K/UL (ref 0–0.5)
EOSINOPHIL NFR BLD: 0 % (ref 0–8)
ERYTHROCYTE [DISTWIDTH] IN BLOOD BY AUTOMATED COUNT: 14.1 % (ref 11.5–14.5)
EST. GFR  (AFRICAN AMERICAN): 39.8 ML/MIN/1.73 M^2
EST. GFR  (AFRICAN AMERICAN): 59.7 ML/MIN/1.73 M^2
EST. GFR  (NON AFRICAN AMERICAN): 34.4 ML/MIN/1.73 M^2
EST. GFR  (NON AFRICAN AMERICAN): 51.7 ML/MIN/1.73 M^2
FIBRINOGEN PPP-MCNC: 202 MG/DL (ref 182–400)
FIBRINOGEN PPP-MCNC: 223 MG/DL (ref 182–400)
FIO2: 0.8
FIO2: 70
FIO2: 75
FIO2: 80
FIO2: 80
GLUCOSE SERPL-MCNC: 119 MG/DL (ref 70–110)
GLUCOSE SERPL-MCNC: 124 MG/DL (ref 70–110)
GLUCOSE SERPL-MCNC: 131 MG/DL (ref 70–110)
GLUCOSE SERPL-MCNC: 143 MG/DL (ref 70–110)
GLUCOSE SERPL-MCNC: 145 MG/DL (ref 70–110)
GLUCOSE SERPL-MCNC: 150 MG/DL (ref 70–110)
GLUCOSE SERPL-MCNC: 151 MG/DL (ref 70–110)
GLUCOSE SERPL-MCNC: 153 MG/DL (ref 70–110)
GLUCOSE SERPL-MCNC: 159 MG/DL (ref 70–110)
GLUCOSE SERPL-MCNC: 159 MG/DL (ref 70–110)
GLUCOSE SERPL-MCNC: 160 MG/DL (ref 70–110)
GLUCOSE SERPL-MCNC: 161 MG/DL (ref 70–110)
GLUCOSE SERPL-MCNC: 178 MG/DL (ref 70–110)
GLUCOSE SERPL-MCNC: 183 MG/DL (ref 70–110)
GLUCOSE SERPL-MCNC: 188 MG/DL (ref 70–110)
GLUCOSE SERPL-MCNC: 201 MG/DL (ref 70–110)
GLUCOSE SERPL-MCNC: 78 MG/DL (ref 70–110)
HCO3 UR-SCNC: 17.2 MMOL/L (ref 24–28)
HCO3 UR-SCNC: 17.8 MMOL/L (ref 24–28)
HCO3 UR-SCNC: 17.8 MMOL/L (ref 24–28)
HCO3 UR-SCNC: 18 MMOL/L (ref 24–28)
HCO3 UR-SCNC: 18.3 MMOL/L (ref 24–28)
HCO3 UR-SCNC: 19.1 MMOL/L (ref 24–28)
HCO3 UR-SCNC: 19.3 MMOL/L (ref 24–28)
HCO3 UR-SCNC: 20.4 MMOL/L (ref 24–28)
HCO3 UR-SCNC: 20.8 MMOL/L (ref 24–28)
HCO3 UR-SCNC: 21 MMOL/L (ref 24–28)
HCO3 UR-SCNC: 21.1 MMOL/L (ref 24–28)
HCO3 UR-SCNC: 21.7 MMOL/L (ref 24–28)
HCO3 UR-SCNC: 22.2 MMOL/L (ref 24–28)
HCO3 UR-SCNC: 22.4 MMOL/L (ref 24–28)
HCO3 UR-SCNC: 24.2 MMOL/L (ref 24–28)
HCO3 UR-SCNC: 25 MMOL/L (ref 24–28)
HCO3 UR-SCNC: 26.4 MMOL/L (ref 24–28)
HCO3 UR-SCNC: 27.8 MMOL/L (ref 24–28)
HCT VFR BLD AUTO: 26.5 % (ref 40–54)
HCT VFR BLD CALC: 16 %PCV (ref 36–54)
HCT VFR BLD CALC: 17 %PCV (ref 36–54)
HCT VFR BLD CALC: 19 %PCV (ref 36–54)
HCT VFR BLD CALC: 21 %PCV (ref 36–54)
HCT VFR BLD CALC: 22 %PCV (ref 36–54)
HCT VFR BLD CALC: 24 %PCV (ref 36–54)
HCT VFR BLD CALC: 25 %PCV (ref 36–54)
HCT VFR BLD CALC: 26 %PCV (ref 36–54)
HCT VFR BLD CALC: 26 %PCV (ref 36–54)
HCT VFR BLD CALC: 29 %PCV (ref 36–54)
HGB BLD-MCNC: 8.8 G/DL (ref 14–18)
HYPOCHROMIA BLD QL SMEAR: ABNORMAL
IMM GRANULOCYTES # BLD AUTO: 0.06 K/UL (ref 0–0.04)
IMM GRANULOCYTES NFR BLD AUTO: 0.5 % (ref 0–0.5)
INR PPP: 1.4 (ref 0.8–1.2)
INR PPP: 1.6 (ref 0.8–1.2)
LACTATE SERPL-SCNC: >12 MMOL/L (ref 0.5–2.2)
LDH SERPL L TO P-CCNC: 1.3 MMOL/L (ref 0.36–1.25)
LDH SERPL L TO P-CCNC: 10.52 MMOL/L (ref 0.36–1.25)
LDH SERPL L TO P-CCNC: 13.35 MMOL/L (ref 0.36–1.25)
LDH SERPL L TO P-CCNC: 14.04 MMOL/L (ref 0.36–1.25)
LDH SERPL L TO P-CCNC: 14.36 MMOL/L (ref 0.36–1.25)
LDH SERPL L TO P-CCNC: 14.94 MMOL/L (ref 0.36–1.25)
LDH SERPL L TO P-CCNC: 752 U/L (ref 110–260)
LDH SERPL L TO P-CCNC: 8.26 MMOL/L (ref 0.5–2.2)
LDH SERPL L TO P-CCNC: 9.28 MMOL/L (ref 0.36–1.25)
LYMPHOCYTES # BLD AUTO: 0.3 K/UL (ref 1–4.8)
LYMPHOCYTES NFR BLD: 2.5 % (ref 18–48)
MAGNESIUM SERPL-MCNC: 2.1 MG/DL (ref 1.6–2.6)
MAGNESIUM SERPL-MCNC: 2.6 MG/DL (ref 1.6–2.6)
MAGNESIUM SERPL-MCNC: 2.6 MG/DL (ref 1.6–2.6)
MCH RBC QN AUTO: 29.4 PG (ref 27–31)
MCHC RBC AUTO-ENTMCNC: 33.2 G/DL (ref 32–36)
MCV RBC AUTO: 89 FL (ref 82–98)
MONOCYTES # BLD AUTO: 1.6 K/UL (ref 0.3–1)
MONOCYTES NFR BLD: 12.6 % (ref 4–15)
NEUTROPHILS # BLD AUTO: 10.6 K/UL (ref 1.8–7.7)
NEUTROPHILS NFR BLD: 84.2 % (ref 38–73)
NRBC BLD-RTO: 0 /100 WBC
NUM UNITS TRANS FFP: NORMAL
NUM UNITS TRANS PACKED RBC: NORMAL
OVALOCYTES BLD QL SMEAR: ABNORMAL
PCO2 BLDA: 34.4 MMHG (ref 35–45)
PCO2 BLDA: 38.5 MMHG (ref 35–45)
PCO2 BLDA: 39.3 MMHG (ref 35–45)
PCO2 BLDA: 40.9 MMHG (ref 35–45)
PCO2 BLDA: 41.6 MMHG (ref 35–45)
PCO2 BLDA: 41.9 MMHG (ref 35–45)
PCO2 BLDA: 42.7 MMHG (ref 35–45)
PCO2 BLDA: 42.8 MMHG (ref 35–45)
PCO2 BLDA: 44.1 MMHG (ref 35–45)
PCO2 BLDA: 44.9 MMHG (ref 35–45)
PCO2 BLDA: 45.4 MMHG (ref 35–45)
PCO2 BLDA: 45.6 MMHG (ref 35–45)
PCO2 BLDA: 46.1 MMHG (ref 35–45)
PCO2 BLDA: 47.1 MMHG (ref 35–45)
PCO2 BLDA: 48.4 MMHG (ref 35–45)
PCO2 BLDA: 53.6 MMHG (ref 35–45)
PCO2 BLDA: 54.2 MMHG (ref 35–45)
PCO2 BLDA: 56.9 MMHG (ref 35–45)
PCO2 BLDA: 59.6 MMHG (ref 35–45)
PCO2 BLDA: 68.2 MMHG (ref 35–45)
PH SMN: 7.11 [PH] (ref 7.35–7.45)
PH SMN: 7.16 [PH] (ref 7.35–7.45)
PH SMN: 7.16 [PH] (ref 7.35–7.45)
PH SMN: 7.19 [PH] (ref 7.35–7.45)
PH SMN: 7.2 [PH] (ref 7.35–7.45)
PH SMN: 7.21 [PH] (ref 7.35–7.45)
PH SMN: 7.21 [PH] (ref 7.35–7.45)
PH SMN: 7.23 [PH] (ref 7.35–7.45)
PH SMN: 7.24 [PH] (ref 7.35–7.45)
PH SMN: 7.24 [PH] (ref 7.35–7.45)
PH SMN: 7.25 [PH] (ref 7.35–7.45)
PH SMN: 7.26 [PH] (ref 7.35–7.45)
PH SMN: 7.29 [PH] (ref 7.35–7.45)
PH SMN: 7.31 [PH] (ref 7.35–7.45)
PH SMN: 7.31 [PH] (ref 7.35–7.45)
PH SMN: 7.36 [PH] (ref 7.35–7.45)
PH SMN: 7.37 [PH] (ref 7.35–7.45)
PH SMN: 7.38 [PH] (ref 7.35–7.45)
PH SMN: 7.38 [PH] (ref 7.35–7.45)
PH SMN: 7.41 [PH] (ref 7.35–7.45)
PHOSPHATE SERPL-MCNC: 2.4 MG/DL (ref 2.7–4.5)
PHOSPHATE SERPL-MCNC: 2.4 MG/DL (ref 2.7–4.5)
PLATELET # BLD AUTO: 52 K/UL (ref 150–450)
PLATELET BLD QL SMEAR: ABNORMAL
PMV BLD AUTO: 10.3 FL (ref 9.2–12.9)
PO2 BLDA: 126 MMHG (ref 80–100)
PO2 BLDA: 145 MMHG (ref 80–100)
PO2 BLDA: 163 MMHG (ref 80–100)
PO2 BLDA: 182 MMHG (ref 80–100)
PO2 BLDA: 192 MMHG (ref 80–100)
PO2 BLDA: 214 MMHG (ref 80–100)
PO2 BLDA: 233 MMHG (ref 80–100)
PO2 BLDA: 257 MMHG (ref 80–100)
PO2 BLDA: 260 MMHG (ref 80–100)
PO2 BLDA: 27 MMHG (ref 40–60)
PO2 BLDA: 374 MMHG (ref 80–100)
PO2 BLDA: 38 MMHG (ref 40–60)
PO2 BLDA: 42 MMHG (ref 40–60)
PO2 BLDA: 438 MMHG (ref 80–100)
PO2 BLDA: 45 MMHG (ref 40–60)
PO2 BLDA: 55 MMHG (ref 80–100)
PO2 BLDA: 76 MMHG (ref 80–100)
PO2 BLDA: 78 MMHG (ref 80–100)
PO2 BLDA: 86 MMHG (ref 80–100)
PO2 BLDA: 94 MMHG (ref 80–100)
POC BE: -10 MMOL/L
POC BE: -3 MMOL/L
POC BE: -3 MMOL/L
POC BE: -5 MMOL/L
POC BE: -5 MMOL/L
POC BE: -6 MMOL/L
POC BE: -6 MMOL/L
POC BE: -7 MMOL/L
POC BE: -8 MMOL/L
POC BE: -8 MMOL/L
POC BE: -9 MMOL/L
POC BE: 0 MMOL/L
POC BE: 2 MMOL/L
POC BE: 3 MMOL/L
POC IONIZED CALCIUM: 0.96 MMOL/L (ref 1.06–1.42)
POC IONIZED CALCIUM: 0.97 MMOL/L (ref 1.06–1.42)
POC IONIZED CALCIUM: 1.03 MMOL/L (ref 1.06–1.42)
POC IONIZED CALCIUM: 1.07 MMOL/L (ref 1.06–1.42)
POC IONIZED CALCIUM: 1.11 MMOL/L (ref 1.06–1.42)
POC IONIZED CALCIUM: 1.15 MMOL/L (ref 1.06–1.42)
POC IONIZED CALCIUM: 1.15 MMOL/L (ref 1.06–1.42)
POC IONIZED CALCIUM: 1.16 MMOL/L (ref 1.06–1.42)
POC IONIZED CALCIUM: 1.18 MMOL/L (ref 1.06–1.42)
POC IONIZED CALCIUM: 1.18 MMOL/L (ref 1.06–1.42)
POC IONIZED CALCIUM: 1.21 MMOL/L (ref 1.06–1.42)
POC IONIZED CALCIUM: 1.22 MMOL/L (ref 1.06–1.42)
POC IONIZED CALCIUM: 1.22 MMOL/L (ref 1.06–1.42)
POC IONIZED CALCIUM: 1.24 MMOL/L (ref 1.06–1.42)
POC IONIZED CALCIUM: 1.25 MMOL/L (ref 1.06–1.42)
POC IONIZED CALCIUM: 1.28 MMOL/L (ref 1.06–1.42)
POC IONIZED CALCIUM: 1.32 MMOL/L (ref 1.06–1.42)
POC IONIZED CALCIUM: 1.33 MMOL/L (ref 1.06–1.42)
POC SATURATED O2: 100 % (ref 95–100)
POC SATURATED O2: 49 % (ref 95–100)
POC SATURATED O2: 62 % (ref 95–100)
POC SATURATED O2: 65 % (ref 95–100)
POC SATURATED O2: 70 % (ref 95–100)
POC SATURATED O2: 76 % (ref 95–100)
POC SATURATED O2: 92 % (ref 95–100)
POC SATURATED O2: 93 % (ref 95–100)
POC SATURATED O2: 93 % (ref 95–100)
POC SATURATED O2: 95 % (ref 95–100)
POC SATURATED O2: 98 % (ref 95–100)
POC SATURATED O2: 99 % (ref 95–100)
POC TCO2: 18 MMOL/L (ref 23–27)
POC TCO2: 19 MMOL/L (ref 23–27)
POC TCO2: 20 MMOL/L (ref 23–27)
POC TCO2: 21 MMOL/L (ref 23–27)
POC TCO2: 21 MMOL/L (ref 23–27)
POC TCO2: 22 MMOL/L (ref 23–27)
POC TCO2: 22 MMOL/L (ref 24–29)
POC TCO2: 22 MMOL/L (ref 24–29)
POC TCO2: 23 MMOL/L (ref 23–27)
POC TCO2: 23 MMOL/L (ref 23–27)
POC TCO2: 24 MMOL/L (ref 23–27)
POC TCO2: 26 MMOL/L (ref 24–29)
POC TCO2: 26 MMOL/L (ref 24–29)
POC TCO2: 28 MMOL/L (ref 23–27)
POC TCO2: 29 MMOL/L (ref 23–27)
POIKILOCYTOSIS BLD QL SMEAR: SLIGHT
POLYCHROMASIA BLD QL SMEAR: ABNORMAL
POTASSIUM BLD-SCNC: 2.9 MMOL/L (ref 3.5–5.1)
POTASSIUM BLD-SCNC: 3 MMOL/L (ref 3.5–5.1)
POTASSIUM BLD-SCNC: 3.1 MMOL/L (ref 3.5–5.1)
POTASSIUM BLD-SCNC: 3.1 MMOL/L (ref 3.5–5.1)
POTASSIUM BLD-SCNC: 3.3 MMOL/L (ref 3.5–5.1)
POTASSIUM BLD-SCNC: 3.4 MMOL/L (ref 3.5–5.1)
POTASSIUM BLD-SCNC: 3.5 MMOL/L (ref 3.5–5.1)
POTASSIUM BLD-SCNC: 3.7 MMOL/L (ref 3.5–5.1)
POTASSIUM BLD-SCNC: 3.7 MMOL/L (ref 3.5–5.1)
POTASSIUM BLD-SCNC: 3.8 MMOL/L (ref 3.5–5.1)
POTASSIUM BLD-SCNC: 4.1 MMOL/L (ref 3.5–5.1)
POTASSIUM BLD-SCNC: 4.2 MMOL/L (ref 3.5–5.1)
POTASSIUM BLD-SCNC: 4.5 MMOL/L (ref 3.5–5.1)
POTASSIUM BLD-SCNC: 5.2 MMOL/L (ref 3.5–5.1)
POTASSIUM BLD-SCNC: 5.3 MMOL/L (ref 3.5–5.1)
POTASSIUM BLD-SCNC: 5.8 MMOL/L (ref 3.5–5.1)
POTASSIUM BLD-SCNC: 5.8 MMOL/L (ref 3.5–5.1)
POTASSIUM BLD-SCNC: 6.3 MMOL/L (ref 3.5–5.1)
POTASSIUM SERPL-SCNC: 3.2 MMOL/L (ref 3.5–5.1)
POTASSIUM SERPL-SCNC: 3.7 MMOL/L (ref 3.5–5.1)
PROT SERPL-MCNC: 3.7 G/DL (ref 6–8.4)
PROTHROMBIN TIME: 14 SEC (ref 9–12.5)
PROTHROMBIN TIME: 16.1 SEC (ref 9–12.5)
RBC # BLD AUTO: 2.99 M/UL (ref 4.6–6.2)
SAMPLE: ABNORMAL
SARS-COV-2 RDRP RESP QL NAA+PROBE: NEGATIVE
SITE: ABNORMAL
SODIUM BLD-SCNC: 135 MMOL/L (ref 136–145)
SODIUM BLD-SCNC: 135 MMOL/L (ref 136–145)
SODIUM BLD-SCNC: 136 MMOL/L (ref 136–145)
SODIUM BLD-SCNC: 138 MMOL/L (ref 136–145)
SODIUM BLD-SCNC: 139 MMOL/L (ref 136–145)
SODIUM BLD-SCNC: 139 MMOL/L (ref 136–145)
SODIUM BLD-SCNC: 142 MMOL/L (ref 136–145)
SODIUM BLD-SCNC: 144 MMOL/L (ref 136–145)
SODIUM BLD-SCNC: 145 MMOL/L (ref 136–145)
SODIUM BLD-SCNC: 145 MMOL/L (ref 136–145)
SODIUM BLD-SCNC: 146 MMOL/L (ref 136–145)
SODIUM BLD-SCNC: 146 MMOL/L (ref 136–145)
SODIUM BLD-SCNC: 148 MMOL/L (ref 136–145)
SODIUM BLD-SCNC: 149 MMOL/L (ref 136–145)
SODIUM BLD-SCNC: 149 MMOL/L (ref 136–145)
SODIUM BLD-SCNC: 150 MMOL/L (ref 136–145)
SODIUM SERPL-SCNC: 130 MMOL/L (ref 136–145)
SODIUM SERPL-SCNC: 147 MMOL/L (ref 136–145)
T4 FREE SERPL-MCNC: 1.87 NG/DL (ref 0.71–1.51)
TOXIC GRANULES BLD QL SMEAR: PRESENT
TSH SERPL DL<=0.005 MIU/L-ACNC: 0.11 UIU/ML (ref 0.4–4)
UNIT NUMBER: NORMAL
WBC # BLD AUTO: 12.62 K/UL (ref 3.9–12.7)

## 2022-07-13 PROCEDURE — 85002 BLEEDING TIME TEST: CPT

## 2022-07-13 PROCEDURE — 83615 LACTATE (LD) (LDH) ENZYME: CPT | Performed by: STUDENT IN AN ORGANIZED HEALTH CARE EDUCATION/TRAINING PROGRAM

## 2022-07-13 PROCEDURE — 63600175 PHARM REV CODE 636 W HCPCS: Performed by: STUDENT IN AN ORGANIZED HEALTH CARE EDUCATION/TRAINING PROGRAM

## 2022-07-13 PROCEDURE — P9017 PLASMA 1 DONOR FRZ W/IN 8 HR: HCPCS | Performed by: INTERNAL MEDICINE

## 2022-07-13 PROCEDURE — 27201037 HC PRESSURE MONITORING SET UP

## 2022-07-13 PROCEDURE — 99900035 HC TECH TIME PER 15 MIN (STAT)

## 2022-07-13 PROCEDURE — 85014 HEMATOCRIT: CPT

## 2022-07-13 PROCEDURE — 25000003 PHARM REV CODE 250: Performed by: STUDENT IN AN ORGANIZED HEALTH CARE EDUCATION/TRAINING PROGRAM

## 2022-07-13 PROCEDURE — P9012 CRYOPRECIPITATE EACH UNIT: HCPCS | Performed by: THORACIC SURGERY (CARDIOTHORACIC VASCULAR SURGERY)

## 2022-07-13 PROCEDURE — P9012 CRYOPRECIPITATE EACH UNIT: HCPCS | Performed by: ANESTHESIOLOGY

## 2022-07-13 PROCEDURE — 33979 PR INSERT VENT ASST DEV,IMPLANT,SINGLE VENT: ICD-10-PCS | Mod: ,,, | Performed by: THORACIC SURGERY (CARDIOTHORACIC VASCULAR SURGERY)

## 2022-07-13 PROCEDURE — P9035 PLATELET PHERES LEUKOREDUCED: HCPCS | Performed by: PHYSICIAN ASSISTANT

## 2022-07-13 PROCEDURE — 99233 PR SUBSEQUENT HOSPITAL CARE,LEVL III: ICD-10-PCS | Mod: ,,, | Performed by: INTERNAL MEDICINE

## 2022-07-13 PROCEDURE — 99233 SBSQ HOSP IP/OBS HIGH 50: CPT | Mod: ,,, | Performed by: INTERNAL MEDICINE

## 2022-07-13 PROCEDURE — 83605 ASSAY OF LACTIC ACID: CPT | Performed by: STUDENT IN AN ORGANIZED HEALTH CARE EDUCATION/TRAINING PROGRAM

## 2022-07-13 PROCEDURE — 83735 ASSAY OF MAGNESIUM: CPT | Mod: 91 | Performed by: STUDENT IN AN ORGANIZED HEALTH CARE EDUCATION/TRAINING PROGRAM

## 2022-07-13 PROCEDURE — P9017 PLASMA 1 DONOR FRZ W/IN 8 HR: HCPCS | Performed by: STUDENT IN AN ORGANIZED HEALTH CARE EDUCATION/TRAINING PROGRAM

## 2022-07-13 PROCEDURE — 37799 UNLISTED PX VASCULAR SURGERY: CPT

## 2022-07-13 PROCEDURE — 27202057 HC OXYGENATOR - CHANGE OUT

## 2022-07-13 PROCEDURE — 27400108 HC CENTRIMAG BLOOD PUMP IMPLANT KIT

## 2022-07-13 PROCEDURE — 36415 COLL VENOUS BLD VENIPUNCTURE: CPT | Performed by: INTERNAL MEDICINE

## 2022-07-13 PROCEDURE — P9017 PLASMA 1 DONOR FRZ W/IN 8 HR: HCPCS | Performed by: THORACIC SURGERY (CARDIOTHORACIC VASCULAR SURGERY)

## 2022-07-13 PROCEDURE — 94002 VENT MGMT INPAT INIT DAY: CPT

## 2022-07-13 PROCEDURE — 36000710: Performed by: THORACIC SURGERY (CARDIOTHORACIC VASCULAR SURGERY)

## 2022-07-13 PROCEDURE — 27801475 HC HEARTMATE III IMPLANT KIT

## 2022-07-13 PROCEDURE — C1751 CATH, INF, PER/CENT/MIDLINE: HCPCS

## 2022-07-13 PROCEDURE — 93750 PR INTERROGATE VENT ASSIST DEV, IN PERSON, W PHYSICIAN ANALYSIS: ICD-10-PCS | Mod: ,,, | Performed by: INTERNAL MEDICINE

## 2022-07-13 PROCEDURE — 85730 THROMBOPLASTIN TIME PARTIAL: CPT | Mod: 91 | Performed by: STUDENT IN AN ORGANIZED HEALTH CARE EDUCATION/TRAINING PROGRAM

## 2022-07-13 PROCEDURE — 83735 ASSAY OF MAGNESIUM: CPT | Performed by: INTERNAL MEDICINE

## 2022-07-13 PROCEDURE — 36620 PR INSERT CATH,ART,PERCUT,SHORTTERM: ICD-10-PCS | Mod: 59,,, | Performed by: ANESTHESIOLOGY

## 2022-07-13 PROCEDURE — C1729 CATH, DRAINAGE: HCPCS | Performed by: THORACIC SURGERY (CARDIOTHORACIC VASCULAR SURGERY)

## 2022-07-13 PROCEDURE — 85610 PROTHROMBIN TIME: CPT | Performed by: THORACIC SURGERY (CARDIOTHORACIC VASCULAR SURGERY)

## 2022-07-13 PROCEDURE — 27200953 HC CARDIOPLEGIA SYSTEM

## 2022-07-13 PROCEDURE — 33979 INSERT INTRACORPOREAL DEVICE: CPT | Mod: ,,, | Performed by: THORACIC SURGERY (CARDIOTHORACIC VASCULAR SURGERY)

## 2022-07-13 PROCEDURE — P9016 RBC LEUKOCYTES REDUCED: HCPCS | Performed by: STUDENT IN AN ORGANIZED HEALTH CARE EDUCATION/TRAINING PROGRAM

## 2022-07-13 PROCEDURE — 84132 ASSAY OF SERUM POTASSIUM: CPT

## 2022-07-13 PROCEDURE — 33390 PR VALVUPLASTY, AORTIC, OPEN W CP BYPASS, SIMPLE: ICD-10-PCS | Mod: 22,51,, | Performed by: THORACIC SURGERY (CARDIOTHORACIC VASCULAR SURGERY)

## 2022-07-13 PROCEDURE — 99499 NO LOS: ICD-10-PCS | Mod: ,,, | Performed by: STUDENT IN AN ORGANIZED HEALTH CARE EDUCATION/TRAINING PROGRAM

## 2022-07-13 PROCEDURE — 82803 BLOOD GASES ANY COMBINATION: CPT

## 2022-07-13 PROCEDURE — P9017 PLASMA 1 DONOR FRZ W/IN 8 HR: HCPCS | Performed by: ANESTHESIOLOGY

## 2022-07-13 PROCEDURE — P9035 PLATELET PHERES LEUKOREDUCED: HCPCS | Performed by: INTERNAL MEDICINE

## 2022-07-13 PROCEDURE — D9220A PRA ANESTHESIA: Mod: ,,, | Performed by: ANESTHESIOLOGY

## 2022-07-13 PROCEDURE — 76937 PR  US GUIDE, VASCULAR ACCESS: ICD-10-PCS | Mod: 26,,, | Performed by: ANESTHESIOLOGY

## 2022-07-13 PROCEDURE — 27201673 HC ANCILLARY CANNULA

## 2022-07-13 PROCEDURE — 36556 INSERT NON-TUNNEL CV CATH: CPT | Mod: 59,,, | Performed by: ANESTHESIOLOGY

## 2022-07-13 PROCEDURE — 83605 ASSAY OF LACTIC ACID: CPT

## 2022-07-13 PROCEDURE — P9017 PLASMA 1 DONOR FRZ W/IN 8 HR: HCPCS | Performed by: PHYSICIAN ASSISTANT

## 2022-07-13 PROCEDURE — C9113 INJ PANTOPRAZOLE SODIUM, VIA: HCPCS | Performed by: STUDENT IN AN ORGANIZED HEALTH CARE EDUCATION/TRAINING PROGRAM

## 2022-07-13 PROCEDURE — 33390 VALVULOPLASTY AORTIC VALVE: CPT | Mod: 22,51,, | Performed by: THORACIC SURGERY (CARDIOTHORACIC VASCULAR SURGERY)

## 2022-07-13 PROCEDURE — 88300 SURGICAL PATH GROSS: CPT | Performed by: PATHOLOGY

## 2022-07-13 PROCEDURE — 27000248 HC VAD-ADDITIONAL DAY

## 2022-07-13 PROCEDURE — 88300 SURGICAL PATH GROSS: CPT | Mod: 26,,, | Performed by: PATHOLOGY

## 2022-07-13 PROCEDURE — 93750 INTERROGATION VAD IN PERSON: CPT | Mod: ,,, | Performed by: INTERNAL MEDICINE

## 2022-07-13 PROCEDURE — 33947 ECMO/ECLS INITIATION ARTERY: CPT

## 2022-07-13 PROCEDURE — 84295 ASSAY OF SERUM SODIUM: CPT

## 2022-07-13 PROCEDURE — 25000003 PHARM REV CODE 250: Performed by: THORACIC SURGERY (CARDIOTHORACIC VASCULAR SURGERY)

## 2022-07-13 PROCEDURE — P9016 RBC LEUKOCYTES REDUCED: HCPCS | Performed by: ANESTHESIOLOGY

## 2022-07-13 PROCEDURE — 27201423 OPTIME MED/SURG SUP & DEVICES STERILE SUPPLY: Performed by: THORACIC SURGERY (CARDIOTHORACIC VASCULAR SURGERY)

## 2022-07-13 PROCEDURE — 33946 ECMO/ECLS INITIATION VENOUS: CPT | Mod: 51,,, | Performed by: THORACIC SURGERY (CARDIOTHORACIC VASCULAR SURGERY)

## 2022-07-13 PROCEDURE — 33956 ECMO/ECLS INSJ CTR CANNULA: CPT | Mod: 51,,, | Performed by: THORACIC SURGERY (CARDIOTHORACIC VASCULAR SURGERY)

## 2022-07-13 PROCEDURE — C1768 GRAFT, VASCULAR: HCPCS | Performed by: THORACIC SURGERY (CARDIOTHORACIC VASCULAR SURGERY)

## 2022-07-13 PROCEDURE — 27201015 HC HEMO-CONCENTRATOR

## 2022-07-13 PROCEDURE — 33956 PR ECMO/ECLS INSJ CTR CANNULA STERN/THORACOTOMY 6 YR OLD & OLDER: ICD-10-PCS | Mod: 51,,, | Performed by: THORACIC SURGERY (CARDIOTHORACIC VASCULAR SURGERY)

## 2022-07-13 PROCEDURE — L8670 VASCULAR GRAFT, SYNTHETIC: HCPCS | Performed by: THORACIC SURGERY (CARDIOTHORACIC VASCULAR SURGERY)

## 2022-07-13 PROCEDURE — 27000685 HC LVAD KIT (30 DAY SUPPLY)

## 2022-07-13 PROCEDURE — 36000713 HC OR TIME LEV V EA ADD 15 MIN: Performed by: THORACIC SURGERY (CARDIOTHORACIC VASCULAR SURGERY)

## 2022-07-13 PROCEDURE — 37000009 HC ANESTHESIA EA ADD 15 MINS: Performed by: THORACIC SURGERY (CARDIOTHORACIC VASCULAR SURGERY)

## 2022-07-13 PROCEDURE — 99499 UNLISTED E&M SERVICE: CPT | Mod: ,,, | Performed by: STUDENT IN AN ORGANIZED HEALTH CARE EDUCATION/TRAINING PROGRAM

## 2022-07-13 PROCEDURE — 27100088 HC CELL SAVER

## 2022-07-13 PROCEDURE — 27000445 HC TEMPORARY PACEMAKER LEADS

## 2022-07-13 PROCEDURE — 84100 ASSAY OF PHOSPHORUS: CPT | Performed by: STUDENT IN AN ORGANIZED HEALTH CARE EDUCATION/TRAINING PROGRAM

## 2022-07-13 PROCEDURE — 76937 US GUIDE VASCULAR ACCESS: CPT | Mod: 26,,, | Performed by: ANESTHESIOLOGY

## 2022-07-13 PROCEDURE — P9035 PLATELET PHERES LEUKOREDUCED: HCPCS | Performed by: ANESTHESIOLOGY

## 2022-07-13 PROCEDURE — 63600367 HC NITRIC OXIDE PER HOUR

## 2022-07-13 PROCEDURE — 27000175 HC ADULT BYPASS PUMP

## 2022-07-13 PROCEDURE — 94761 N-INVAS EAR/PLS OXIMETRY MLT: CPT

## 2022-07-13 PROCEDURE — 82330 ASSAY OF CALCIUM: CPT

## 2022-07-13 PROCEDURE — 36000712 HC OR TIME LEV V 1ST 15 MIN: Performed by: THORACIC SURGERY (CARDIOTHORACIC VASCULAR SURGERY)

## 2022-07-13 PROCEDURE — 86965 POOLING BLOOD PLATELETS: CPT | Performed by: THORACIC SURGERY (CARDIOTHORACIC VASCULAR SURGERY)

## 2022-07-13 PROCEDURE — 27000191 HC C-V MONITORING

## 2022-07-13 PROCEDURE — D9220A PRA ANESTHESIA: ICD-10-PCS | Mod: ,,, | Performed by: ANESTHESIOLOGY

## 2022-07-13 PROCEDURE — 20000000 HC ICU ROOM

## 2022-07-13 PROCEDURE — 80053 COMPREHEN METABOLIC PANEL: CPT | Performed by: STUDENT IN AN ORGANIZED HEALTH CARE EDUCATION/TRAINING PROGRAM

## 2022-07-13 PROCEDURE — 27100026 HC SHUNT SENSOR, TERUMO

## 2022-07-13 PROCEDURE — 80048 BASIC METABOLIC PNL TOTAL CA: CPT | Mod: XB | Performed by: INTERNAL MEDICINE

## 2022-07-13 PROCEDURE — 93312 ECHO TRANSESOPHAGEAL: CPT | Mod: 26,59,, | Performed by: ANESTHESIOLOGY

## 2022-07-13 PROCEDURE — 84439 ASSAY OF FREE THYROXINE: CPT | Performed by: INTERNAL MEDICINE

## 2022-07-13 PROCEDURE — 88300 PR  SURG PATH,GROSS,LEVEL I: ICD-10-PCS | Mod: 26,,, | Performed by: PATHOLOGY

## 2022-07-13 PROCEDURE — 85730 THROMBOPLASTIN TIME PARTIAL: CPT | Performed by: INTERNAL MEDICINE

## 2022-07-13 PROCEDURE — P9016 RBC LEUKOCYTES REDUCED: HCPCS | Performed by: INTERNAL MEDICINE

## 2022-07-13 PROCEDURE — 36620 INSERTION CATHETER ARTERY: CPT | Mod: 59,,, | Performed by: ANESTHESIOLOGY

## 2022-07-13 PROCEDURE — 85610 PROTHROMBIN TIME: CPT | Mod: 91 | Performed by: STUDENT IN AN ORGANIZED HEALTH CARE EDUCATION/TRAINING PROGRAM

## 2022-07-13 PROCEDURE — 27201004 HC FEMORAL BYPASS CANNULA

## 2022-07-13 PROCEDURE — P9045 ALBUMIN (HUMAN), 5%, 250 ML: HCPCS | Mod: JG | Performed by: STUDENT IN AN ORGANIZED HEALTH CARE EDUCATION/TRAINING PROGRAM

## 2022-07-13 PROCEDURE — 86965 POOLING BLOOD PLATELETS: CPT | Performed by: ANESTHESIOLOGY

## 2022-07-13 PROCEDURE — 36592 COLLECT BLOOD FROM PICC: CPT

## 2022-07-13 PROCEDURE — 85520 HEPARIN ASSAY: CPT

## 2022-07-13 PROCEDURE — 85384 FIBRINOGEN ACTIVITY: CPT | Mod: 91 | Performed by: STUDENT IN AN ORGANIZED HEALTH CARE EDUCATION/TRAINING PROGRAM

## 2022-07-13 PROCEDURE — 85384 FIBRINOGEN ACTIVITY: CPT | Performed by: THORACIC SURGERY (CARDIOTHORACIC VASCULAR SURGERY)

## 2022-07-13 PROCEDURE — P9016 RBC LEUKOCYTES REDUCED: HCPCS | Performed by: PHYSICIAN ASSISTANT

## 2022-07-13 PROCEDURE — P9035 PLATELET PHERES LEUKOREDUCED: HCPCS | Performed by: STUDENT IN AN ORGANIZED HEALTH CARE EDUCATION/TRAINING PROGRAM

## 2022-07-13 PROCEDURE — 93312 TEE: ICD-10-PCS | Mod: 26,59,, | Performed by: ANESTHESIOLOGY

## 2022-07-13 PROCEDURE — 27202608 HC CANNULA, MISC

## 2022-07-13 PROCEDURE — 36556 CENTRAL LINE: ICD-10-PCS | Mod: 59,,, | Performed by: ANESTHESIOLOGY

## 2022-07-13 PROCEDURE — U0002 COVID-19 LAB TEST NON-CDC: HCPCS | Performed by: INTERNAL MEDICINE

## 2022-07-13 PROCEDURE — 84443 ASSAY THYROID STIM HORMONE: CPT | Performed by: INTERNAL MEDICINE

## 2022-07-13 PROCEDURE — 25000003 PHARM REV CODE 250: Performed by: PHYSICIAN ASSISTANT

## 2022-07-13 PROCEDURE — 85025 COMPLETE CBC W/AUTO DIFF WBC: CPT | Performed by: STUDENT IN AN ORGANIZED HEALTH CARE EDUCATION/TRAINING PROGRAM

## 2022-07-13 PROCEDURE — 33946 PR ECMO/ECLS INITIATION VENOUS: ICD-10-PCS | Mod: 51,,, | Performed by: THORACIC SURGERY (CARDIOTHORACIC VASCULAR SURGERY)

## 2022-07-13 PROCEDURE — 37000008 HC ANESTHESIA 1ST 15 MINUTES: Performed by: THORACIC SURGERY (CARDIOTHORACIC VASCULAR SURGERY)

## 2022-07-13 PROCEDURE — 63600175 PHARM REV CODE 636 W HCPCS: Performed by: PHYSICIAN ASSISTANT

## 2022-07-13 DEVICE — GRAFT 8 X 30 GELWEAVE WOVEN: Type: IMPLANTABLE DEVICE | Site: HEART | Status: FUNCTIONAL

## 2022-07-13 DEVICE — FELT TEFLON 1INX6IN: Type: IMPLANTABLE DEVICE | Site: CHEST | Status: FUNCTIONAL

## 2022-07-13 RX ORDER — PANTOPRAZOLE SODIUM 40 MG/10ML
40 INJECTION, POWDER, LYOPHILIZED, FOR SOLUTION INTRAVENOUS 2 TIMES DAILY
Status: DISCONTINUED | OUTPATIENT
Start: 2022-07-13 | End: 2022-07-15

## 2022-07-13 RX ORDER — PROTAMINE SULFATE 10 MG/ML
50 INJECTION, SOLUTION INTRAVENOUS ONCE
Status: DISCONTINUED | OUTPATIENT
Start: 2022-07-13 | End: 2022-07-13

## 2022-07-13 RX ORDER — MUPIROCIN 20 MG/G
OINTMENT TOPICAL 2 TIMES DAILY
Status: DISPENSED | OUTPATIENT
Start: 2022-07-13 | End: 2022-07-18

## 2022-07-13 RX ORDER — INDOMETHACIN 25 MG/1
100 CAPSULE ORAL ONCE
Status: COMPLETED | OUTPATIENT
Start: 2022-07-13 | End: 2022-07-13

## 2022-07-13 RX ORDER — DOPAMINE HYDROCHLORIDE 160 MG/100ML
0-20 INJECTION, SOLUTION INTRAVENOUS CONTINUOUS
Status: DISCONTINUED | OUTPATIENT
Start: 2022-07-13 | End: 2022-07-17

## 2022-07-13 RX ORDER — RIFAMPIN 600 MG/10ML
INJECTION, POWDER, LYOPHILIZED, FOR SOLUTION INTRAVENOUS
Status: DISCONTINUED | OUTPATIENT
Start: 2022-07-13 | End: 2022-07-13

## 2022-07-13 RX ORDER — CEFAZOLIN SODIUM/D5W 2 G/100 ML
2 PLASTIC BAG, INJECTION (ML) INTRAVENOUS
Status: DISCONTINUED | OUTPATIENT
Start: 2022-07-13 | End: 2022-07-14

## 2022-07-13 RX ORDER — MAGNESIUM SULFATE HEPTAHYDRATE 40 MG/ML
INJECTION, SOLUTION INTRAVENOUS
Status: DISCONTINUED | OUTPATIENT
Start: 2022-07-13 | End: 2022-07-13

## 2022-07-13 RX ORDER — ACETAMINOPHEN 325 MG/1
650 TABLET ORAL EVERY 8 HOURS PRN
Status: DISCONTINUED | OUTPATIENT
Start: 2022-07-13 | End: 2022-07-13

## 2022-07-13 RX ORDER — EPINEPHRINE 1 MG/ML
INJECTION, SOLUTION INTRACARDIAC; INTRAMUSCULAR; INTRAVENOUS; SUBCUTANEOUS
Status: DISCONTINUED | OUTPATIENT
Start: 2022-07-13 | End: 2022-07-13

## 2022-07-13 RX ORDER — VASOPRESSIN 20 [USP'U]/ML
INJECTION, SOLUTION INTRAMUSCULAR; SUBCUTANEOUS
Status: DISCONTINUED | OUTPATIENT
Start: 2022-07-13 | End: 2022-07-13

## 2022-07-13 RX ORDER — CALCIUM GLUCONATE 20 MG/ML
1 INJECTION, SOLUTION INTRAVENOUS
Status: DISCONTINUED | OUTPATIENT
Start: 2022-07-13 | End: 2022-07-14

## 2022-07-13 RX ORDER — FUROSEMIDE 10 MG/ML
INJECTION INTRAMUSCULAR; INTRAVENOUS
Status: DISCONTINUED | OUTPATIENT
Start: 2022-07-13 | End: 2022-07-13

## 2022-07-13 RX ORDER — ONDANSETRON 2 MG/ML
INJECTION INTRAMUSCULAR; INTRAVENOUS
Status: DISCONTINUED | OUTPATIENT
Start: 2022-07-13 | End: 2022-07-13

## 2022-07-13 RX ORDER — CALCIUM GLUCONATE 20 MG/ML
2 INJECTION, SOLUTION INTRAVENOUS
Status: DISCONTINUED | OUTPATIENT
Start: 2022-07-13 | End: 2022-07-14

## 2022-07-13 RX ORDER — PROPOFOL 10 MG/ML
0-50 INJECTION, EMULSION INTRAVENOUS CONTINUOUS
Status: DISCONTINUED | OUTPATIENT
Start: 2022-07-13 | End: 2022-07-18

## 2022-07-13 RX ORDER — DOPAMINE HYDROCHLORIDE 160 MG/100ML
INJECTION, SOLUTION INTRAVENOUS CONTINUOUS PRN
Status: DISCONTINUED | OUTPATIENT
Start: 2022-07-13 | End: 2022-07-13

## 2022-07-13 RX ORDER — MIDAZOLAM HYDROCHLORIDE 1 MG/ML
INJECTION, SOLUTION INTRAMUSCULAR; INTRAVENOUS
Status: DISCONTINUED | OUTPATIENT
Start: 2022-07-13 | End: 2022-07-13

## 2022-07-13 RX ORDER — HYDROMORPHONE HYDROCHLORIDE 1 MG/ML
2 INJECTION, SOLUTION INTRAMUSCULAR; INTRAVENOUS; SUBCUTANEOUS EVERY 4 HOURS PRN
Status: DISCONTINUED | OUTPATIENT
Start: 2022-07-13 | End: 2022-07-17

## 2022-07-13 RX ORDER — POTASSIUM CHLORIDE 14.9 MG/ML
20 INJECTION INTRAVENOUS
Status: DISCONTINUED | OUTPATIENT
Start: 2022-07-13 | End: 2022-07-14

## 2022-07-13 RX ORDER — NOREPINEPHRINE BITARTRATE 1 MG/ML
INJECTION, SOLUTION INTRAVENOUS
Status: DISCONTINUED | OUTPATIENT
Start: 2022-07-13 | End: 2022-07-13

## 2022-07-13 RX ORDER — LEVOTHYROXINE SODIUM 112 UG/1
112 TABLET ORAL
Status: DISCONTINUED | OUTPATIENT
Start: 2022-07-14 | End: 2022-07-28

## 2022-07-13 RX ORDER — SODIUM CHLORIDE 9 MG/ML
INJECTION, SOLUTION INTRAVENOUS
Status: DISCONTINUED | OUTPATIENT
Start: 2022-07-13 | End: 2022-08-19

## 2022-07-13 RX ORDER — DOBUTAMINE HYDROCHLORIDE 400 MG/100ML
0-20 INJECTION INTRAVENOUS CONTINUOUS
Status: DISCONTINUED | OUTPATIENT
Start: 2022-07-13 | End: 2022-07-13

## 2022-07-13 RX ORDER — LIDOCAINE HYDROCHLORIDE 20 MG/ML
INJECTION, SOLUTION EPIDURAL; INFILTRATION; INTRACAUDAL; PERINEURAL
Status: DISCONTINUED | OUTPATIENT
Start: 2022-07-13 | End: 2022-07-13

## 2022-07-13 RX ORDER — INDOMETHACIN 25 MG/1
CAPSULE ORAL
Status: DISCONTINUED | OUTPATIENT
Start: 2022-07-13 | End: 2022-07-13

## 2022-07-13 RX ORDER — AMIODARONE HYDROCHLORIDE 150 MG/3ML
INJECTION, SOLUTION INTRAVENOUS
Status: DISCONTINUED | OUTPATIENT
Start: 2022-07-13 | End: 2022-07-13

## 2022-07-13 RX ORDER — PHENYLEPHRINE HCL IN 0.9% NACL 1 MG/10 ML
SYRINGE (ML) INTRAVENOUS
Status: DISCONTINUED | OUTPATIENT
Start: 2022-07-13 | End: 2022-07-13

## 2022-07-13 RX ORDER — TRANEXAMIC ACID 100 MG/ML
INJECTION, SOLUTION INTRAVENOUS CONTINUOUS PRN
Status: DISCONTINUED | OUTPATIENT
Start: 2022-07-13 | End: 2022-07-13

## 2022-07-13 RX ORDER — PROTAMINE SULFATE 10 MG/ML
INJECTION, SOLUTION INTRAVENOUS
Status: DISCONTINUED | OUTPATIENT
Start: 2022-07-13 | End: 2022-07-13

## 2022-07-13 RX ORDER — INDOMETHACIN 25 MG/1
50 CAPSULE ORAL ONCE
Status: COMPLETED | OUTPATIENT
Start: 2022-07-14 | End: 2022-07-13

## 2022-07-13 RX ORDER — POTASSIUM CHLORIDE 14.9 MG/ML
INJECTION INTRAVENOUS CONTINUOUS PRN
Status: DISCONTINUED | OUTPATIENT
Start: 2022-07-13 | End: 2022-07-13

## 2022-07-13 RX ORDER — HYDROMORPHONE HYDROCHLORIDE 1 MG/ML
1 INJECTION, SOLUTION INTRAMUSCULAR; INTRAVENOUS; SUBCUTANEOUS EVERY 4 HOURS PRN
Status: DISCONTINUED | OUTPATIENT
Start: 2022-07-13 | End: 2022-07-17

## 2022-07-13 RX ORDER — CALCIUM GLUCONATE 20 MG/ML
3 INJECTION, SOLUTION INTRAVENOUS
Status: DISCONTINUED | OUTPATIENT
Start: 2022-07-13 | End: 2022-07-14

## 2022-07-13 RX ORDER — METHYLENE BLUE 5 MG/ML
INJECTION INTRAVENOUS
Status: DISCONTINUED | OUTPATIENT
Start: 2022-07-13 | End: 2022-07-13

## 2022-07-13 RX ORDER — PROTAMINE SULFATE 10 MG/ML
50 INJECTION, SOLUTION INTRAVENOUS ONCE
Status: COMPLETED | OUTPATIENT
Start: 2022-07-13 | End: 2022-07-13

## 2022-07-13 RX ORDER — PANTOPRAZOLE SODIUM 40 MG/10ML
40 INJECTION, POWDER, LYOPHILIZED, FOR SOLUTION INTRAVENOUS DAILY
Status: DISCONTINUED | OUTPATIENT
Start: 2022-07-14 | End: 2022-07-13

## 2022-07-13 RX ORDER — ROCURONIUM BROMIDE 10 MG/ML
INJECTION, SOLUTION INTRAVENOUS
Status: DISCONTINUED | OUTPATIENT
Start: 2022-07-13 | End: 2022-07-13

## 2022-07-13 RX ORDER — CALCIUM GLUCONATE 20 MG/ML
1 INJECTION, SOLUTION INTRAVENOUS ONCE
Status: DISCONTINUED | OUTPATIENT
Start: 2022-07-13 | End: 2022-07-14

## 2022-07-13 RX ORDER — POTASSIUM CHLORIDE 14.9 MG/ML
40 INJECTION INTRAVENOUS
Status: DISCONTINUED | OUTPATIENT
Start: 2022-07-13 | End: 2022-07-14

## 2022-07-13 RX ORDER — FENTANYL CITRATE 50 UG/ML
INJECTION, SOLUTION INTRAMUSCULAR; INTRAVENOUS
Status: DISCONTINUED | OUTPATIENT
Start: 2022-07-13 | End: 2022-07-13

## 2022-07-13 RX ORDER — NOREPINEPHRINE BITARTRATE/D5W 4MG/250ML
0-3 PLASTIC BAG, INJECTION (ML) INTRAVENOUS CONTINUOUS
Status: DISCONTINUED | OUTPATIENT
Start: 2022-07-13 | End: 2022-07-15

## 2022-07-13 RX ORDER — POTASSIUM CHLORIDE 29.8 MG/ML
40 INJECTION INTRAVENOUS
Status: DISCONTINUED | OUTPATIENT
Start: 2022-07-13 | End: 2022-07-14

## 2022-07-13 RX ORDER — HEPARIN SODIUM 1000 [USP'U]/ML
INJECTION, SOLUTION INTRAVENOUS; SUBCUTANEOUS
Status: DISCONTINUED | OUTPATIENT
Start: 2022-07-13 | End: 2022-07-13

## 2022-07-13 RX ORDER — HYDROCODONE BITARTRATE AND ACETAMINOPHEN 500; 5 MG/1; MG/1
TABLET ORAL
Status: DISCONTINUED | OUTPATIENT
Start: 2022-07-13 | End: 2022-07-16 | Stop reason: SDUPTHER

## 2022-07-13 RX ORDER — ETOMIDATE 2 MG/ML
INJECTION INTRAVENOUS
Status: DISCONTINUED | OUTPATIENT
Start: 2022-07-13 | End: 2022-07-13

## 2022-07-13 RX ORDER — ALBUMIN HUMAN 50 G/1000ML
25 SOLUTION INTRAVENOUS ONCE
Status: COMPLETED | OUTPATIENT
Start: 2022-07-14 | End: 2022-07-14

## 2022-07-13 RX ORDER — VANCOMYCIN HCL IN 5 % DEXTROSE 1G/250ML
1000 PLASTIC BAG, INJECTION (ML) INTRAVENOUS
Status: DISCONTINUED | OUTPATIENT
Start: 2022-07-13 | End: 2022-07-14

## 2022-07-13 RX ORDER — ALBUMIN HUMAN 50 G/1000ML
SOLUTION INTRAVENOUS CONTINUOUS PRN
Status: DISCONTINUED | OUTPATIENT
Start: 2022-07-13 | End: 2022-07-13

## 2022-07-13 RX ORDER — MAGNESIUM SULFATE HEPTAHYDRATE 40 MG/ML
4 INJECTION, SOLUTION INTRAVENOUS
Status: DISCONTINUED | OUTPATIENT
Start: 2022-07-13 | End: 2022-07-14

## 2022-07-13 RX ORDER — TRANEXAMIC ACID 100 MG/ML
INJECTION, SOLUTION INTRAVENOUS
Status: DISCONTINUED | OUTPATIENT
Start: 2022-07-13 | End: 2022-07-13

## 2022-07-13 RX ORDER — POTASSIUM CHLORIDE 20 MEQ/1
40 TABLET, EXTENDED RELEASE ORAL ONCE
Status: DISCONTINUED | OUTPATIENT
Start: 2022-07-13 | End: 2022-07-13

## 2022-07-13 RX ORDER — MAGNESIUM SULFATE HEPTAHYDRATE 40 MG/ML
2 INJECTION, SOLUTION INTRAVENOUS
Status: DISCONTINUED | OUTPATIENT
Start: 2022-07-13 | End: 2022-07-14

## 2022-07-13 RX ORDER — INDOMETHACIN 25 MG/1
CAPSULE ORAL
Status: COMPLETED
Start: 2022-07-13 | End: 2022-07-13

## 2022-07-13 RX ADMIN — LIDOCAINE HYDROCHLORIDE 100 MG: 20 INJECTION, SOLUTION EPIDURAL; INFILTRATION; INTRACAUDAL at 10:07

## 2022-07-13 RX ADMIN — NOREPINEPHRINE BITARTRATE 32 MCG: 1 INJECTION, SOLUTION, CONCENTRATE INTRAVENOUS at 01:07

## 2022-07-13 RX ADMIN — NOREPINEPHRINE BITARTRATE 0.15 MCG/KG/MIN: 4 INJECTION, SOLUTION INTRAVENOUS at 10:07

## 2022-07-13 RX ADMIN — MIDAZOLAM 2 MG: 1 INJECTION INTRAMUSCULAR; INTRAVENOUS at 08:07

## 2022-07-13 RX ADMIN — VASOPRESSIN 3 UNITS: 20 INJECTION, SOLUTION INTRAMUSCULAR; SUBCUTANEOUS at 03:07

## 2022-07-13 RX ADMIN — PROTAMINE SULFATE 170 MG: 10 INJECTION, SOLUTION INTRAVENOUS at 04:07

## 2022-07-13 RX ADMIN — SODIUM BICARBONATE 50 MEQ: 84 INJECTION, SOLUTION INTRAVENOUS at 03:07

## 2022-07-13 RX ADMIN — VASOPRESSIN 1 UNITS: 20 INJECTION, SOLUTION INTRAMUSCULAR; SUBCUTANEOUS at 10:07

## 2022-07-13 RX ADMIN — ONDANSETRON 0.5 G: 2 INJECTION INTRAMUSCULAR; INTRAVENOUS at 03:07

## 2022-07-13 RX ADMIN — LIDOCAINE HYDROCHLORIDE 100 MG: 20 INJECTION, SOLUTION EPIDURAL; INFILTRATION; INTRACAUDAL at 08:07

## 2022-07-13 RX ADMIN — PROTAMINE SULFATE 50 MG: 10 INJECTION, SOLUTION INTRAVENOUS at 10:07

## 2022-07-13 RX ADMIN — VASOPRESSIN 6 UNITS: 20 INJECTION, SOLUTION INTRAMUSCULAR; SUBCUTANEOUS at 03:07

## 2022-07-13 RX ADMIN — SODIUM BICARBONATE 100 MEQ: 84 INJECTION, SOLUTION INTRAVENOUS at 09:07

## 2022-07-13 RX ADMIN — MIDAZOLAM 1 MG: 1 INJECTION INTRAMUSCULAR; INTRAVENOUS at 08:07

## 2022-07-13 RX ADMIN — ROCURONIUM BROMIDE 50 MG: 10 INJECTION INTRAVENOUS at 04:07

## 2022-07-13 RX ADMIN — VASOPRESSIN 2 UNITS: 20 INJECTION, SOLUTION INTRAMUSCULAR; SUBCUTANEOUS at 03:07

## 2022-07-13 RX ADMIN — Medication 1000 MCG: at 04:07

## 2022-07-13 RX ADMIN — ONDANSETRON 1 G: 2 INJECTION INTRAMUSCULAR; INTRAVENOUS at 07:07

## 2022-07-13 RX ADMIN — TRANEXAMIC ACID 1000 MG: 100 INJECTION, SOLUTION INTRAVENOUS at 09:07

## 2022-07-13 RX ADMIN — ETOMIDATE 10 MG: 2 INJECTION INTRAVENOUS at 08:07

## 2022-07-13 RX ADMIN — AMIODARONE HYDROCHLORIDE 150 MG: 50 INJECTION, SOLUTION INTRAVENOUS at 01:07

## 2022-07-13 RX ADMIN — ALBUMIN (HUMAN): 12.5 SOLUTION INTRAVENOUS at 06:07

## 2022-07-13 RX ADMIN — ROCURONIUM BROMIDE 100 MG: 10 INJECTION INTRAVENOUS at 08:07

## 2022-07-13 RX ADMIN — HEPARIN SODIUM 35000 UNITS: 1000 INJECTION INTRAVENOUS; SUBCUTANEOUS at 12:07

## 2022-07-13 RX ADMIN — DOPAMINE HYDROCHLORIDE 3 MCG/KG/MIN: 160 INJECTION, SOLUTION INTRAVENOUS at 03:07

## 2022-07-13 RX ADMIN — NOREPINEPHRINE BITARTRATE 44 MCG: 1 INJECTION, SOLUTION, CONCENTRATE INTRAVENOUS at 01:07

## 2022-07-13 RX ADMIN — ANGIOTENSIN II 20 NG/KG/MIN: 2.5 INJECTION INTRAVENOUS at 01:07

## 2022-07-13 RX ADMIN — METHYLENE BLUE 50 MG: 5 INJECTION INTRAVENOUS at 03:07

## 2022-07-13 RX ADMIN — CALCIUM GLUCONATE 1 G: 20 INJECTION, SOLUTION INTRAVENOUS at 11:07

## 2022-07-13 RX ADMIN — ONDANSETRON 0.5 G: 2 INJECTION INTRAMUSCULAR; INTRAVENOUS at 04:07

## 2022-07-13 RX ADMIN — SODIUM BICARBONATE 50 MEQ: 84 INJECTION, SOLUTION INTRAVENOUS at 04:07

## 2022-07-13 RX ADMIN — ONDANSETRON 0.2 G: 2 INJECTION INTRAMUSCULAR; INTRAVENOUS at 03:07

## 2022-07-13 RX ADMIN — VASOPRESSIN 2 UNITS: 20 INJECTION, SOLUTION INTRAMUSCULAR; SUBCUTANEOUS at 01:07

## 2022-07-13 RX ADMIN — CEFEPIME 2 G: 2 INJECTION, POWDER, FOR SOLUTION INTRAVENOUS at 01:07

## 2022-07-13 RX ADMIN — PROTAMINE SULFATE 10 MG: 10 INJECTION, SOLUTION INTRAVENOUS at 03:07

## 2022-07-13 RX ADMIN — POTASSIUM CHLORIDE 40 MEQ: 29.8 INJECTION, SOLUTION INTRAVENOUS at 11:07

## 2022-07-13 RX ADMIN — EPINEPHRINE 10 MCG: 1 INJECTION, SOLUTION, CONCENTRATE INTRAVENOUS at 08:07

## 2022-07-13 RX ADMIN — FENTANYL CITRATE 100 MCG: 50 INJECTION INTRAMUSCULAR; INTRAVENOUS at 08:07

## 2022-07-13 RX ADMIN — MUPIROCIN: 20 OINTMENT TOPICAL at 09:07

## 2022-07-13 RX ADMIN — CEFEPIME 2 G: 2 INJECTION, POWDER, FOR SOLUTION INTRAVENOUS at 09:07

## 2022-07-13 RX ADMIN — VANCOMYCIN HYDROCHLORIDE 1000 MG: 1.25 INJECTION, POWDER, LYOPHILIZED, FOR SOLUTION INTRAVENOUS at 09:07

## 2022-07-13 RX ADMIN — VASOPRESSIN 20 UNITS: 20 INJECTION, SOLUTION INTRAMUSCULAR; SUBCUTANEOUS at 04:07

## 2022-07-13 RX ADMIN — SODIUM CHLORIDE, SODIUM GLUCONATE, SODIUM ACETATE, POTASSIUM CHLORIDE, MAGNESIUM CHLORIDE, SODIUM PHOSPHATE, DIBASIC, AND POTASSIUM PHOSPHATE: .53; .5; .37; .037; .03; .012; .00082 INJECTION, SOLUTION INTRAVENOUS at 09:07

## 2022-07-13 RX ADMIN — FUROSEMIDE 60 MG: 10 INJECTION, SOLUTION INTRAMUSCULAR; INTRAVENOUS at 06:07

## 2022-07-13 RX ADMIN — Medication 0.12 MCG/KG/MIN: at 11:07

## 2022-07-13 RX ADMIN — DOBUTAMINE HYDROCHLORIDE 2.5 MCG/KG/MIN: 400 INJECTION INTRAVENOUS at 04:07

## 2022-07-13 RX ADMIN — VASOPRESSIN 0.04 UNITS/MIN: 20 INJECTION INTRAVENOUS at 10:07

## 2022-07-13 RX ADMIN — ONDANSETRON 1 G: 2 INJECTION INTRAMUSCULAR; INTRAVENOUS at 03:07

## 2022-07-13 RX ADMIN — ROCURONIUM BROMIDE 30 MG: 10 INJECTION INTRAVENOUS at 10:07

## 2022-07-13 RX ADMIN — HEPARIN SODIUM 40000 UNITS: 1000 INJECTION INTRAVENOUS; SUBCUTANEOUS at 05:07

## 2022-07-13 RX ADMIN — POTASSIUM CHLORIDE: 14.9 INJECTION, SOLUTION INTRAVENOUS at 07:07

## 2022-07-13 RX ADMIN — MAGNESIUM SULFATE 2 G: 2 INJECTION INTRAVENOUS at 01:07

## 2022-07-13 RX ADMIN — ROCURONIUM BROMIDE 70 MG: 10 INJECTION INTRAVENOUS at 11:07

## 2022-07-13 RX ADMIN — POTASSIUM CHLORIDE 20 MEQ: 14.9 INJECTION, SOLUTION INTRAVENOUS at 09:07

## 2022-07-13 RX ADMIN — SODIUM CHLORIDE 1 MG/KG/HR: 9 INJECTION, SOLUTION INTRAVENOUS at 08:07

## 2022-07-13 RX ADMIN — DEXTROSE 2 G: 50 INJECTION, SOLUTION INTRAVENOUS at 11:07

## 2022-07-13 RX ADMIN — PROTAMINE SULFATE 290 MG: 10 INJECTION, SOLUTION INTRAVENOUS at 06:07

## 2022-07-13 RX ADMIN — PANTOPRAZOLE SODIUM 40 MG: 40 INJECTION, POWDER, FOR SOLUTION INTRAVENOUS at 10:07

## 2022-07-13 RX ADMIN — ALBUMIN (HUMAN) 25 G: 12.5 SOLUTION INTRAVENOUS at 11:07

## 2022-07-13 RX ADMIN — EPINEPHRINE 0.06 MCG/KG/MIN: 1 INJECTION INTRAMUSCULAR; INTRAVENOUS; SUBCUTANEOUS at 08:07

## 2022-07-13 RX ADMIN — INSULIN HUMAN 2 UNITS/HR: 1 INJECTION, SOLUTION INTRAVENOUS at 10:07

## 2022-07-13 RX ADMIN — HYDROCORTISONE SODIUM SUCCINATE 100 MG: 100 INJECTION, POWDER, FOR SOLUTION INTRAMUSCULAR; INTRAVENOUS at 04:07

## 2022-07-13 RX ADMIN — VASOPRESSIN 1 UNITS: 20 INJECTION, SOLUTION INTRAMUSCULAR; SUBCUTANEOUS at 12:07

## 2022-07-13 RX ADMIN — NOREPINEPHRINE BITARTRATE 0.04 MCG/KG/MIN: 1 INJECTION, SOLUTION, CONCENTRATE INTRAVENOUS at 11:07

## 2022-07-13 RX ADMIN — LIDOCAINE HYDROCHLORIDE 100 MG: 20 INJECTION, SOLUTION EPIDURAL; INFILTRATION; INTRACAUDAL at 12:07

## 2022-07-13 RX ADMIN — ALBUMIN (HUMAN): 12.5 SOLUTION INTRAVENOUS at 04:07

## 2022-07-13 RX ADMIN — VASOPRESSIN 1 UNITS: 20 INJECTION, SOLUTION INTRAMUSCULAR; SUBCUTANEOUS at 02:07

## 2022-07-13 RX ADMIN — NOREPINEPHRINE BITARTRATE 28 MCG: 1 INJECTION, SOLUTION, CONCENTRATE INTRAVENOUS at 01:07

## 2022-07-13 RX ADMIN — VASOPRESSIN 2 UNITS: 20 INJECTION, SOLUTION INTRAMUSCULAR; SUBCUTANEOUS at 12:07

## 2022-07-13 RX ADMIN — MEXILETINE HYDROCHLORIDE 250 MG: 250 CAPSULE ORAL at 06:07

## 2022-07-13 RX ADMIN — LEVOTHYROXINE SODIUM 112 MCG: 112 TABLET ORAL at 06:07

## 2022-07-13 RX ADMIN — ONDANSETRON 0.3 G: 2 INJECTION INTRAMUSCULAR; INTRAVENOUS at 03:07

## 2022-07-13 RX ADMIN — NOREPINEPHRINE BITARTRATE 12 MCG: 1 INJECTION, SOLUTION, CONCENTRATE INTRAVENOUS at 11:07

## 2022-07-13 RX ADMIN — EPINEPHRINE 20 MCG: 1 INJECTION, SOLUTION, CONCENTRATE INTRAVENOUS at 03:07

## 2022-07-13 NOTE — ANESTHESIA PREPROCEDURE EVALUATION
[facility]  Anesthesia Pre-Operative Evaluation         Patient Name: Tim Richards  YOB: 1965  MRN: [id number]    SUBJECTIVE:     Pre-operative evaluation for Procedure(s) (LRB):  CLOSURE, WOUND, STERNUM (N/A)  INSERTION-RIGHT VENTRICULAR ASSIST DEVICE (Right)     07/13/2022    Tim Richards is a 56 y.o. male w/ a significant PMHx of CHF, CKD3, HTN, anemia (Hgb 8.4), CHICHI, and dilated cardiomyopathy s/p LVAD presenting with gradual worsening shortness of breath associated with nonpleuritic, nonradiating bilateral retrosternal chest pressure pain. Pt currently on heparin gtt due to concern for thrombosis of current LVAD. Scheduled for replacement of pump 7/13/22.    Patient now presents for the above procedure(s).      LDA: None documented.    Prev airway:     Intubation 9/14/20  Performed by: Dandre Valentine CRNA  Authorized by: Rashad Yarbrough MD      Intubation:     Induction:  Intravenous    Intubated:  Postinduction    Mask Ventilation:  Easy mask    Attempts:  1    Attempted By:  CRNA    Method of Intubation:  Direct    Blade:  Barrios 2    Laryngeal View Grade: Grade I - full view of chords      Difficult Airway Encountered?: No      Complications:  None    Airway Device:  Oral endotracheal tube    Airway Device Size:  8.0    Style/Cuff Inflation:  Cuffed (inflated to minimal occlusive pressure)    Tube secured:  23    Secured at:  The teeth    Placement Verified By:  Capnometry    Complicating Factors:  None    Findings Post-Intubation:  BS equal bilateral      Intubation 7/10/20  Performed by: Stephania Ferguson CRNA  Authorized by: Grover Puente MD      Intubation:     Induction:  Intravenous    Intubated:  Postinduction    Mask Ventilation:  Easy with oral airway    Attempts:  1    Attempted By:  CRNA    Method of Intubation:  Video laryngoscopy    Blade:  Booth 4    Laryngeal View Grade: Grade I - full view of chords      Difficult Airway Encountered?:  No      Complications:  None    Airway Device:  Oral endotracheal tube    Airway Device Size:  7.5    Style/Cuff Inflation:  Cuffed (inflated to minimal occlusive pressure)    Inflation Amount (mL):  8    Tube secured:  24    Secured at:  The lips    Placement Verified By:  Capnometry    Complicating Factors:  Anterior larynx and obesity    Findings Post-Intubation:  BS equal bilateral    Drips: None documented.    Patient Active Problem List   Diagnosis    Cigarette nicotine dependence without complication    Alcohol abuse    Pulmonary nodule seen on imaging study    Chronic combined systolic and diastolic heart failure    High risk medications (amiodarone) long-term use    Acute on chronic heart failure    CKD (chronic kidney disease), stage III    Hypertensive cardiovascular-renal disease, stage 1-4 or unspecified chronic kidney disease, with heart failure    Palliative care encounter    LVAD (left ventricular assist device) present    Hyperbilirubinemia    Anemia    Hypertension    Anticoagulation monitoring, INR range 2-3    Left ventricular assist device (LVAD) complication    Pre-transplant evaluation for heart transplant    GIB (gastrointestinal bleeding)    Thrombocytopenia, unspecified    GI bleed    History of bleeding ulcers    Shortness of breath    Sustained ventricular tachycardia    Class 1 obesity due to excess calories with serious comorbidity and body mass index (BMI) of 32.0 to 32.9 in adult    amiodarone induced hypothyrodism    Heart replaced by heart assist device    Substance or medication-induced sleep disorder, insomnia type    VF (ventricular fibrillation)    Elevated serum lactate dehydrogenase (LDH)    Essential hypertension    CHICHI (obstructive sleep apnea)    Gout    Dilated cardiomyopathy    Acute blood loss anemia    Implantable cardioverter-defibrillator (ICD) discharge    Post traumatic stress disorder (PTSD)    Diuretic-induced hypokalemia     COVID-19    Acute on chronic combined systolic and diastolic congestive heart failure    History of ventricular tachycardia    Anxiety    Advance care planning       Review of patient's allergies indicates:   Allergen Reactions    Lisinopril Anaphylaxis    Hydralazine analogues      Chronic constipation, impotence, dizziness       Current Outpatient Medications:    Current Facility-Administered Medications:     0.9%  NaCl infusion (for blood administration), , Intravenous, Q24H PRN, Josefina Mcclure PA-C    0.9%  NaCl infusion (for blood administration), , Intravenous, Q24H PRN, Josefina Mcclure PA-C    0.9%  NaCl infusion (for blood administration), , Intravenous, Q24H PRN, Josefina Mcclure PA-C    0.9%  NaCl infusion, , Intravenous, Continuous, Amy Rhodes MD, Last Rate: 5 mL/hr at 07/13/22 0200, Rate Verify at 07/13/22 0200    acetaminophen tablet 650 mg, 650 mg, Oral, Q6H PRN, Chelle Bailey MD, 650 mg at 07/10/22 1814    allopurinoL tablet 100 mg, 100 mg, Oral, Daily, Ari Yun MD, 100 mg at 07/12/22 0838    ALPRAZolam tablet 1 mg, 1 mg, Oral, Daily PRN, Ari Yun MD, 1 mg at 07/12/22 1301    amiodarone tablet 400 mg, 400 mg, Oral, Daily, Ari Yun MD, 400 mg at 07/12/22 0837    aspirin EC tablet 325 mg, 325 mg, Oral, Daily, Antonio Hadley Jr., MD, 325 mg at 07/12/22 0838    busPIRone tablet 5 mg, 5 mg, Oral, TID, Ari Yun MD, 5 mg at 07/12/22 2151    cefepime in dextrose 5 % IVPB 2 g, 2 g, Intravenous, Once Pre-Op, Josefina Mcclure PA-C, Held at 07/13/22 0400    famotidine tablet 20 mg, 20 mg, Oral, BID PRN, Juan Cedillo MD, 20 mg at 07/07/22 1811    heparin 25,000 units in dextrose 5% 250 mL (100 units/mL) infusion LOW INTENSITY nomogram - [facility] - VAD PATIENTS, 0-40 Units/kg/hr (Adjusted), Intravenous, Continuous, Antonio Hadley Jr., MD, Last Rate: 19.7 mL/hr at 07/13/22 0200, 22 Units/kg/hr at 07/13/22 0200     levothyroxine tablet 112 mcg, 112 mcg, Oral, Before breakfast, Ari Yun MD, 112 mcg at 07/13/22 0612    LIDOcaine 5 % patch 1 patch, 1 patch, Transdermal, Q24H, Juan Cedillo MD, 1 patch at 07/08/22 1152    melatonin tablet 6 mg, 6 mg, Oral, Nightly PRN, Harry Canales MD, 6 mg at 07/11/22 2311    mexiletine capsule 250 mg, 250 mg, Oral, Q8H, Ari Yun MD, 250 mg at 07/13/22 0655    mupirocin 2 % ointment 1 g, 1 g, Nasal, BID, Josefina Mcclure PA-C, 1 g at 07/12/22 2152    mupirocin 2 % ointment, , Nasal, On Call Procedure, Josefina Mcclure PA-C    pantoprazole EC tablet 40 mg, 40 mg, Oral, Daily with lunch, Ari Yun MD, 40 mg at 07/12/22 1117    potassium chloride SA CR tablet 40 mEq, 40 mEq, Oral, Once, Harry Canales MD    sodium chloride 0.9% flush 10 mL, 10 mL, Intravenous, PRN, Ari Yun MD, 10 mL at 07/11/22 0132    vancomycin 1.25 g in dextrose 5% 250 mL IVPB (ready to mix), 1,250 mg, Intravenous, Once, Josefina Mcclure PA-C, Held at 07/13/22 0400    zolpidem tablet 5 mg, 5 mg, Oral, Nightly PRN, Ari Yun MD, 5 mg at 07/12/22 2151    Past Surgical History:   Procedure Laterality Date    CARDIAC CATHETERIZATION  Dec. 2012    CARDIAC DEFIBRILLATOR PLACEMENT Left     CRRT-D    COLONOSCOPY N/A 3/6/2018    Procedure: COLONOSCOPY;  Surgeon: Alonso Bone MD;  Location: [address]);  Service: Endoscopy;  Laterality: N/A;    COLONOSCOPY N/A 7/17/2019    Procedure: COLONOSCOPY;  Surgeon: Blane Valdez MD;  Location: [facility] ([address] [place] [address]);  Service: Endoscopy;  Laterality: N/A;    COLONOSCOPY N/A 7/18/2019    Procedure: COLONOSCOPY;  Surgeon: Blane Valdez MD;  Location: [facility] ([address] [place] [address]);  Service: Endoscopy;  Laterality: N/A;    ESOPHAGOGASTRODUODENOSCOPY N/A 7/17/2019    Procedure: EGD (ESOPHAGOGASTRODUODENOSCOPY);  Surgeon: Blane Valdez MD;  Location: [facility] ([address] [place] [address]);  Service: Endoscopy;  Laterality: N/A;     ESOPHAGOGASTRODUODENOSCOPY N/A 2019    Procedure: EGD (ESOPHAGOGASTRODUODENOSCOPY);  Surgeon: Blane Valdez MD;  Location: [facility] ([address] [place] [address]);  Service: Endoscopy;  Laterality: N/A;    NONINVASIVE CARDIAC ELECTROPHYSIOLOGY STUDY N/A 10/18/2019    Procedure: CARDIAC ELECTROPHYSIOLOGY STUDY, NONINVASIVE;  Surgeon: Raz Wagner MD;  Location: [facility];  Service: Cardiology;  Laterality: N/A;  VT, DFTs, MDT CRTD in situ, LVAD, anes, MB, 3098    REPLACEMENT OF IMPLANTABLE CARDIOVERTER-DEFIBRILLATOR (ICD) GENERATOR N/A 3/9/2020    Procedure: REPLACEMENT, ICD GENERATOR;  Surgeon: Harry Yun MD;  Location: [facility];  Service: Cardiology;  Laterality: N/A;  VT, ICD Gen Change and Lead Revision, MDT, MAC, DM,3 Prep    REVISION OF IMPLANTABLE CARDIOVERTER-DEFIBRILLATOR (ICD) ELECTRODE LEAD PLACEMENT N/A 3/9/2020    Procedure: REVISION, INSERTION, ELECTRODE LEAD, ICD;  Surgeon: Harry Yun MD;  Location: [facility];  Service: Cardiology;  Laterality: N/A;  VT, ICD Gen Change and Lead Revision, MDT, MAC, DM,3 Prep    TREATMENT OF CARDIAC ARRHYTHMIA  10/18/2019    Procedure: Cardioversion or Defibrillation;  Surgeon: Raz Wagner MD;  Location: [facility];  Service: Cardiology;;       Social History     Socioeconomic History    Marital status:     Number of children: 3   Occupational History     Employer: [employer]    Tobacco Use    Smoking status: Former Smoker     Packs/day: 1.00     Years: 31.00     Pack years: 31.00     Types: Cigarettes     Quit date: 2018     Years since quittin.5    Smokeless tobacco: Never Used    Tobacco comment: pt is quiting on his own - pt stated not qualified for program;  pt  quit on his own   Substance and Sexual Activity    Alcohol use: No     Alcohol/week: 0.0 standard drinks     Comment: quit    Drug use: No    Sexual activity: Yes     Partners: Female     Birth control/protection: None     Comment: 10/5/17  with  same partner 7 years    Social History Narrative    Disabled       OBJECTIVE:     Vital Signs Range (Last 24H):  Temp:  [36.6 °C (97.8 °F)-36.9 °C (98.5 °F)]   Pulse:  [80-94]   Resp:  [20-30]   BP: ()/(0-78)   SpO2:  [94 %-95 %]       Significant Labs:  Lab Results   Component Value Date    WBC 5.02 07/12/2022    HGB 8.4 (L) 07/12/2022    HCT 27.7 (L) 07/12/2022     07/12/2022    CHOL 130 05/19/2022    TRIG 146 05/19/2022    HDL 46 05/19/2022    ALT 57 (H) 07/04/2022     (H) 07/04/2022     (L) 07/13/2022    K 3.7 07/13/2022     07/13/2022    CREATININE 1.5 (H) 07/13/2022    BUN 17 07/13/2022    CO2 22 (L) 07/13/2022    TSH 4.079 (H) 03/17/2022    PSA 0.99 01/23/2018    INR 1.3 (H) 07/04/2022    HGBA1C 5.4 04/26/2021       Diagnostic Studies: No relevant studies.    EKG:   Results for orders placed or performed during the hospital encounter of 06/27/22   EKG 12-lead    Collection Time: 07/07/22  5:59 AM    Narrative    Test Reason :     Vent. Rate : 092 BPM     Atrial Rate : 092 BPM     P-R Int : 088 ms          QRS Dur : 152 ms      QT Int : 504 ms       P-R-T Axes : 050 127 032 degrees     QTc Int : 623 ms    Probably Sinus rhythm with first degree AVB but Ps not well sen  Right axis deviation  Nonspecific intraventricular block  Possible Right ventricular hypertrophy  T wave abnormality, consider lateral ischemia  Abnormal ECG  When compared with ECG of 03-JUL-2022 13:28,  QRS voltage has decreased  Nonspecific T wave abnormality has replaced inverted T waves in Inferior  leads  QT has lengthened  Confirmed by Edmond MOSQUEDA MD ([id number]) on 7/7/2022 3:27:24 PM    Referred By: [email or web address]   SELF           Confirmed By:Edmond MOSQUEDA MD       2D ECHO:  TTE:  Results for orders placed or performed during the hospital encounter of 06/27/22   Echo   Result Value Ref Range    BSA 2.29 m2    [facility] CV RAMP SPEED 1 9,800     [facility] CV RAMP SPEED 2 10,200     [facility] CV RAMP LVEDD 1 9.9     [facility] CV RAMP LVEDD 2  10.4     EF 13 %    LVIDd 9.99 (A) 3.5 - 6.0 cm    PV peak gradient 1.67 mmHg    RVOT peak jorge 0.65 m/s    RVOT peak VTI 7.78 cm    PV mean gradient 0.86 mmHg    TR Max Jorge 2.70 m/s    LV Diastolic Volume 563.43 mL    LV Diastolic Volume Index 249.31 mL/m2    Triscuspid Valve Regurgitation Peak Gradient 29 mmHg    Narrative    · There is an LVAD present. Base speed is 9800 RPMs. The pump type is a   Heartmate II. The interventricular septum appears midline. The aortic   valve does not open.  · The left ventricle is severely enlarged with severe eccentric   hypertrophy and severely decreased systolic function.  · The estimated ejection fraction is 13%.  · Left ventricular diastolic dysfunction.  · There is severe left ventricular global hypokinesis.  · Moderate-to-severe mitral regurgitation.  · Mild to moderate tricuspid regurgitation.  · Limited Speed echo results noted.          BRITTANY:  Results for orders placed or performed during the hospital encounter of 07/01/20   Transesophageal echo (BRITTANY)   Result Value Ref Range    BSA 2.5 m2    Narrative    · 1 cm circumferential pericardial fat pad; unchanged prior to or   following procedure.  · RA, RV and CS leads converge on lateral aspect of SVC prior to removal   during this device extraction.  · HeartMate II in place. AV does not open.  · Severely decreased left ventricular systolic function with global   hypokinesis. The estimated ejection fraction is 15%.  · Sucessfull removal of all endovascular hardware without pericardial   effusion.          ASSESSMENT/PLAN:           Pre-op Assessment    I have reviewed the Patient Summary Reports.     I have reviewed the Nursing Notes. I have reviewed the NPO Status.   I have reviewed the Medications.     Review of Systems  Anesthesia Hx:  History of prior surgery of interest to airway management or planning: Previous anesthesia: General Denies Family Hx of Anesthesia complications.   Denies Personal Hx of Anesthesia  complications.   Cardiovascular:   Hypertension CHF    Pulmonary:   Sleep Apnea    Renal/:   Chronic Renal Disease        Physical Exam  General: Well nourished, Cooperative, Alert and Oriented    Airway:  Mallampati: I / I  Mouth Opening: Normal  TM Distance: Normal  Tongue: Normal  Neck ROM: Normal ROM    Dental:  Intact        Anesthesia Plan  Type of Anesthesia, risks & benefits discussed:    Anesthesia Type: Gen ETT, Gen Supraglottic Airway  Intra-op Monitoring Plan: Standard ASA Monitors  Post Op Pain Control Plan: multimodal analgesia and IV/PO Opioids PRN  Induction:  IV  Airway Plan: Direct  ASA Score: 4  Day of Surgery Review of History & Physical: H&P Update referred to the surgeon/provider.    Ready For Surgery From Anesthesia Perspective.     .

## 2022-07-13 NOTE — SUBJECTIVE & OBJECTIVE
Interval History:   No acute events overnight  The patient's SubQ ICD was deactivated for the procedure    CVP: 17  SVO2: 49  CO: 7.08  CI: 3.09  SVR: 757    Continuous Infusions:   sodium chloride 0.9% Stopped (07/13/22 0601)    heparin (porcine) in D5W Stopped (07/13/22 0500)     Scheduled Meds:   allopurinoL  100 mg Oral Daily    amiodarone  400 mg Oral Daily    aspirin  325 mg Oral Daily    busPIRone  5 mg Oral TID    levothyroxine  112 mcg Oral Before breakfast    LIDOcaine  1 patch Transdermal Q24H    mexiletine  250 mg Oral Q8H    microplegia arrest solution (KCL 80mEq/40 mL)  80 mEq Other Once    microplegia protection soln (LIDOcaine 100mg/MAGNESIUM 4g/qs NS 40mL)   Other Once    mupirocin  1 g Nasal BID    pantoprazole  40 mg Oral Daily with lunch    potassium chloride  40 mEq Oral Once     PRN Meds:sodium chloride, sodium chloride, sodium chloride, acetaminophen, ALPRAZolam, famotidine, melatonin, mupirocin, rifAMpin, sodium chloride 0.9%, zolpidem    Review of patient's allergies indicates:   Allergen Reactions    Lisinopril Anaphylaxis    Hydralazine analogues      Chronic constipation, impotence, dizziness     Objective:     Vital Signs (Most Recent):  Temp: 98.3 °F (36.8 °C) (07/13/22 0701)  Pulse: 80 (07/13/22 0800)  Resp: (!) 45 (07/13/22 0800)  BP:  (Pt refused) (07/13/22 0815)  SpO2: 100 % (07/13/22 0815)   Vital Signs (24h Range):  Temp:  [97.8 °F (36.6 °C)-98.5 °F (36.9 °C)] 98.3 °F (36.8 °C)  Pulse:  [80-94] 80  Resp:  [20-45] 45  SpO2:  [94 %-100 %] 100 %  BP: ()/(0-66) 84/0     Patient Vitals for the past 72 hrs (Last 3 readings):   Weight   07/13/22 0615 104.5 kg (230 lb 6.1 oz)   07/12/22 0311 105 kg (231 lb 7.7 oz)   07/11/22 0303 106 kg (233 lb 11 oz)     Body mass index is 30.4 kg/m².      Intake/Output Summary (Last 24 hours) at 7/13/2022 1155  Last data filed at 7/13/2022 0600  Gross per 24 hour   Intake 446.3 ml   Output 740 ml   Net -293.7 ml       Hemodynamic Parameters:        Telemetry: No events    Physical Exam  Constitutional:       General: He is not in acute distress.     Appearance: He is obese.   HENT:      Head: Normocephalic.      Mouth/Throat:      Mouth: Mucous membranes are moist.   Eyes:      Extraocular Movements: Extraocular movements intact.   Cardiovascular:      Rate and Rhythm: Normal rate and regular rhythm.      Comments: VAD hum appreciated  Pulses detected via doppler  Pulmonary:      Effort: No respiratory distress.      Breath sounds: Normal breath sounds.   Abdominal:      General: Bowel sounds are normal. There is no distension.      Palpations: Abdomen is soft.      Tenderness: There is no abdominal tenderness.   Musculoskeletal:      Cervical back: Neck supple.   Skin:     General: Skin is warm.   Neurological:      General: No focal deficit present.      Mental Status: He is oriented to person, place, and time.   Psychiatric:         Mood and Affect: Mood normal.         Behavior: Behavior normal.       Significant Labs:  CBC:  Recent Labs   Lab 07/10/22  0210 07/11/22  0252 07/12/22  0743   WBC 5.69 5.95 5.02   RBC 3.45* 3.12* 3.18*   HGB 9.1* 8.3* 8.4*   HCT 30.0* 27.3* 27.7*    200 210   MCV 87 88 87   MCH 26.4* 26.6* 26.4*   MCHC 30.3* 30.4* 30.3*     BNP:  No results for input(s): BNP in the last 168 hours.    Invalid input(s): BNPTRIAGELBLO  CMP:  Recent Labs   Lab 07/12/22 0325 07/12/22  1904 07/13/22  0514   GLU 80 93 78   CALCIUM 9.6 9.0 9.3   * 132* 130*   K 4.0 4.0 3.7   CO2 23 26 22*    101 101   BUN 18 18 17   CREATININE 1.5* 1.7* 1.5*      Coagulation:   Recent Labs   Lab 07/11/22 0252 07/12/22 0325 07/13/22  0514   APTT 47.3* 39.0*  39.0* 36.3*     LDH:  Recent Labs   Lab 07/11/22 0252 07/12/22 0325 07/13/22  0514   LDH 1,043* 918* 752*     Microbiology:  Microbiology Results (last 7 days)       ** No results found for the last 168 hours. **            I have reviewed all pertinent labs within the past 24  hours.    Estimated Creatinine Clearance: 70.6 mL/min (A) (based on SCr of 1.5 mg/dL (H)).    Diagnostic Results:  I have reviewed and interpreted all pertinent imaging results/findings within the past 24 hours.

## 2022-07-13 NOTE — ASSESSMENT & PLAN NOTE
-LDH is now downtrending, but there is continued concern for red cell shedding and pump thrombosis  -Haptoglobin low (although it has been chronically low)  -Direct and indirect wendy test are negative  -Plasma free hemoglobin elevated  - Integrilin with a Heparin drip were started until we have lab confirmation that this issue that resolved (normalization of LDH etc).  Integrilin increased to 2 mcg/kg/min on 7/8/22 and discontinued on 7/13.  -Continue Heparin drip  -Aspirin 325mg daily  -[facility] planning for upgrade to [facility] today    Procedure: Device Interrogation Including analysis of device parameters  Current Settings: Ventricular Assist Device    TXP LVAD INTERROGATIONS 7/13/2022 7/13/2022 7/13/2022 7/13/2022 7/13/2022 7/13/2022 7/13/2022   Type HeartMate II HeartMate II HeartMate II HeartMate II HeartMate II HeartMate II HeartMate II   Flow 7.2 7.2 7.1 7.2 7.1 7.4 7.0   Speed 9800 9800 9800 9800 9800 9800 9800   PI 2.8 2.7 2.7 2.6 2.6 2.5 2.7   Power (Jackson) 7.2 7.2 7.1 7.3 7.3 7.5 7.3   LSL 9400 9400 9400 9400 9400 9400 9400   Pulsatility Intermittent pulse Intermittent pulse Intermittent pulse Intermittent pulse Intermittent pulse Intermittent pulse Intermittent pulse

## 2022-07-13 NOTE — PROGRESS NOTES
Autotransfusion/Rapid Infusion Record:      07/13/2022  Autotransfusionist:  Fitz Christianson    Surgeon(s) and Role:     * Yg Kaufman MD - Primary     * Hardeep Biswas MD - Resident - Assisting     * Angelo Avina MD - Fellow     * Sanya Castro MD - Co-Surgeon  Anesthesiologist:  Andrae Barrett Jr., MD    Past Medical History:   Diagnosis Date    Anticoagulant long-term use     CHF (congestive heart failure)     Class 1 obesity due to excess calories with serious comorbidity and body mass index (BMI) of 31.0 to 31.9 in adult     Dilated cardiomyopathy 1/10/2018    Disorder of kidney and ureter     CKD    Encounter for blood transfusion     Gout     HTN (hypertension)     Hx of psychiatric care     ICD (implantable cardioverter-defibrillator) infection 7/1/2020    Psychiatric problem     Thyroid disease     Ventricular tachycardia (paroxysmal)        Procedure(s) (LRB):  REPLACEMENT, PUMP (N/A)  REPAIR, AORTIC VALVE (N/A)     4:23 PM    Equipment:    Cell Saver     R.I.S.  : NovoED Model: CATSmart or CATSplus : [employer]   Model: [id number]     Serial number: [id number]   Serial number:    Disposable lot #: [id number]   Disposable lot #:      Were extra cardiotomies used for cell saver?  no   if yes, #:      Solutions:  Anticoagulant: ACD-A   Expiration date: 11/22 Volume used: 500   Wash solution: 0.9% NaCl   Expiration date: 4/25 Volume used: 4032     Cell saver checklist  Time completed:           [x]   Circuit assembled correctly     [x]   Cell saver powered and operational     [x]   Vacuum connected, functional, adjust to max -150mmHg     [x]   Anticoagulant drip rate adjusted     [x]   Transfer bag properly labeled with patient name, expiration time, volume,       anticoagulant, OR number, and initials     [x]   Cell saver disinfected after use (completed at end of case)       Cell Saver volumes:    Total volume processed:     2257 mL     Total volume pRBCs  recovered     419 mL     Volume pRBCs infused     419 mL         RIS checklist   Time completed:  []   RIS circuit assembled correctly     []   RIS power and operational     []   RIS disinfected after use (completed at end of case)       RIS volumes:    Total volume infused:    (see anesthesia record for blood       product information)    mL       Additional comments:

## 2022-07-13 NOTE — ANESTHESIA PROCEDURE NOTES
Intubation    Date/Time: 7/13/2022 8:48 AM  Performed by: Juanito Dimas MD  Authorized by: Andrae Barrett Jr., MD     Intubation:     Induction:  Intravenous    Intubated:  Postinduction    Mask Ventilation:  Easy with oral airway    Attempts:  1    Attempted By:  Resident anesthesiologist    Method of Intubation:  Video laryngoscopy    Blade:  Booth 3    Laryngeal View Grade: Grade I - full view of cords      Difficult Airway Encountered?: No      Complications:  None    Airway Device:  Oral endotracheal tube    Airway Device Size:  7.5    Style/Cuff Inflation:  Cuffed    Tube secured:  22    Secured at:  The lips    Placement Verified By:  Capnometry and Revisualization with laryngoscopy    Complicating Factors:  None    Findings Post-Intubation:  BS equal bilateral and atraumatic/condition of teeth unchanged

## 2022-07-13 NOTE — NURSING
Pt transported for LVAD replacement via CTS team. Anaesthesia, Blood, and surgical consents obtained. Placed in pt chart and given to CTS team.Surgical checklist completed and placed in pt chart.  Directed By CTS team to hold AM meds and not obtain scheduled CBC. Pt vitals WDL upon transfer; NSR with HR 80-83, Doppler SBP 84/0, Sats >95%, Temp 98.3.Pt denies pain, n/v, or HA. No s/s of distress noted. Wife at bedside upon transport and directed to SICU waiting area.

## 2022-07-13 NOTE — PLAN OF CARE
Hemodynamics Care Update Note       SVO2: 41  CVP: 17  CO: 5.5  CI: 2.4  SVR: 610.9       (calculated using oxygen consumption constant of 3.5)       Plan:  No changes made   Case discussed with on call HTS attending.    Juan Cedillo, PGY4  Cardiovascular Disease  [facility]

## 2022-07-13 NOTE — ANESTHESIA PROCEDURE NOTES
Arterial    Diagnosis: LVAD    Patient location during procedure: done in OR    Staffing  Authorizing Provider: Andrae Barrett Jr., MD  Performing Provider: Juanito Dimas MD    Staffing  Other anesthesia staff: Christine Cartwright MD  Anesthesiologist was present at the time of the procedure.    Preanesthetic Checklist  Completed: patient identified, IV checked, site marked, risks and benefits discussed, surgical consent, monitors and equipment checked, pre-op evaluation, timeout performed and anesthesia consent givenArterial  Skin Prep: chlorhexidine gluconate and isopropyl alcohol  Local Infiltration: lidocaine  Orientation: left  Location: brachial    Catheter Size: 20 G  Catheter placement by Ultrasound guidance. Heme positive aspiration all ports.   Vessel Caliber: medium, patent, compressibility normal  Vascular Doppler:  not done  Needle advanced into vessel with real time Ultrasound guidance.  Guidewire confirmed in vessel.  Sterile sheath used.Insertion Attempts: 1  Assessment  Dressing: secured with tape and tegaderm  Patient: Tolerated well

## 2022-07-13 NOTE — ANESTHESIA PROCEDURE NOTES
L Quad CVC    Diagnosis: LVAD  Patient location during procedure: done in OR    Staffing  Authorizing Provider: Andrae Barrett Jr., MD  Performing Provider: Juanito Dimas MD    Anesthesiologist was present at the time of the procedure.  Preanesthetic Checklist  Completed: patient identified, IV checked, site marked, risks and benefits discussed, surgical consent, monitors and equipment checked, pre-op evaluation, timeout performed and anesthesia consent given  Indication   Indication: vascular access, med administration     Anesthesia   general anesthesia    Central Line   Skin Prep: skin prepped with ChloraPrep, skin prep agent completely dried prior to procedure  Sterile Barriers Followed: Yes    All five maximal barriers used- gloves, gown, cap, mask, and large sterile sheet    hand hygiene performed prior to central venous catheter insertion  Location: left internal jugular.   Catheter type: quad lumen  Catheter Size: 8.5 Fr  Ultrasound: vascular probe with ultrasound   Vessel Caliber: medium, patent  Vascular Doppler:  not done, compressibility normal  Needle advanced into vessel with real time Ultrasound guidance.  Guidewire confirmed in vessel.  sterile gel and probe cover used in ultrasound-guided central venous catheter insertion   Manometry: Venous cannualation confirmed by visual estimation of blood vessel pressure using manometry.  Insertion Attempts: 1   Securement:line sutured, chlorhexidine patch, sterile dressing applied and blood return through all ports    Post-Procedure   X-Ray: no pneumothorax on x-ray   Adverse Events:local hemotoma      Guidewire Guidewire removed intact. Guidewire removed intact, verified with nurse.

## 2022-07-13 NOTE — ASSESSMENT & PLAN NOTE
#ADHF  #NICMP  -Admitted with acute decompensated HF presence of HM2 LVAD  -Continue Amlodipine 10mg daily  -Holding Warfarin, Heparin started instead  -Speed echo at 9800 showed bowing to the [place] [id number], IVS was at midline, MR decreased but worse AR  -Was previously on lasix gtt. Holding Lasix today

## 2022-07-13 NOTE — PROGRESS NOTES
Interdisciplinary Rounds Report:   Attended interdisciplinary rounds with the [facility]/CTS services including the LVAD Coordinators, social workers, cardiologists, surgeons,  PT/OT/Speech, dietician, and unit charge nurses. Discussed patient status, plan of care, goals of care, including DC date, and post discharge needs. Plan of care will be discussed with the patient and/or family per the physician while rounding on the floor. This is a recurring meeting that is medically and socially necessary to collaborate with the interdisciplinary team to assist patient needs and safe discharge.

## 2022-07-13 NOTE — PLAN OF CARE
SICD's therapy and post shock pacing turned off at bedside in anticipation for upcoming procedure this AM.    Juan Cedillo MD  Cardiovascular Department, PGY4  [facility]  [address] [place] [address]

## 2022-07-13 NOTE — PROGRESS NOTES
[facility] - Cardiac Intensive Care  Heart Transplant  Progress Note    Patient Name: Tim Richards  MRN: [id number]  Admission Date: 6/27/2022  Hospital Length of Stay: 16 days  Attending Physician: Amy Rhodes MD  Primary Care Provider: Deyanira Booth MD  Principal Problem:Left ventricular assist device (LVAD) complication    Subjective:     Interval History:   No acute events overnight  The patient's SubQ ICD was deactivated for the procedure    CVP: 17  SVO2: 49  CO: 7.08  CI: 3.09  SVR: 757    Continuous Infusions:   sodium chloride 0.9% Stopped (07/13/22 0601)    heparin (porcine) in D5W Stopped (07/13/22 0500)     Scheduled Meds:   allopurinoL  100 mg Oral Daily    amiodarone  400 mg Oral Daily    aspirin  325 mg Oral Daily    busPIRone  5 mg Oral TID    levothyroxine  112 mcg Oral Before breakfast    LIDOcaine  1 patch Transdermal Q24H    mexiletine  250 mg Oral Q8H    microplegia arrest solution (KCL 80mEq/40 mL)  80 mEq Other Once    microplegia protection soln (LIDOcaine 100mg/MAGNESIUM 4g/qs NS 40mL)   Other Once    mupirocin  1 g Nasal BID    pantoprazole  40 mg Oral Daily with lunch    potassium chloride  40 mEq Oral Once     PRN Meds:sodium chloride, sodium chloride, sodium chloride, acetaminophen, ALPRAZolam, famotidine, melatonin, mupirocin, rifAMpin, sodium chloride 0.9%, zolpidem    Review of patient's allergies indicates:   Allergen Reactions    Lisinopril Anaphylaxis    Hydralazine analogues      Chronic constipation, impotence, dizziness     Objective:     Vital Signs (Most Recent):  Temp: 98.3 °F (36.8 °C) (07/13/22 0701)  Pulse: 80 (07/13/22 0800)  Resp: (!) 45 (07/13/22 0800)  BP:  (Pt refused) (07/13/22 0815)  SpO2: 100 % (07/13/22 0815)   Vital Signs (24h Range):  Temp:  [97.8 °F (36.6 °C)-98.5 °F (36.9 °C)] 98.3 °F (36.8 °C)  Pulse:  [80-94] 80  Resp:  [20-45] 45  SpO2:  [94 %-100 %] 100 %  BP: ()/(0-66) 84/0     Patient Vitals for the past 72 hrs (Last 3  readings):   Weight   07/13/22 0615 104.5 kg (230 lb 6.1 oz)   07/12/22 0311 105 kg (231 lb 7.7 oz)   07/11/22 0303 106 kg (233 lb 11 oz)     Body mass index is 30.4 kg/m².      Intake/Output Summary (Last 24 hours) at 7/13/2022 1155  Last data filed at 7/13/2022 0600  Gross per 24 hour   Intake 446.3 ml   Output 740 ml   Net -293.7 ml       Hemodynamic Parameters:       Telemetry: No events    Physical Exam  Constitutional:       General: He is not in acute distress.     Appearance: He is obese.   HENT:      Head: Normocephalic.      Mouth/Throat:      Mouth: Mucous membranes are moist.   Eyes:      Extraocular Movements: Extraocular movements intact.   Cardiovascular:      Rate and Rhythm: Normal rate and regular rhythm.      Comments: VAD hum appreciated  Pulses detected via doppler  Pulmonary:      Effort: No respiratory distress.      Breath sounds: Normal breath sounds.   Abdominal:      General: Bowel sounds are normal. There is no distension.      Palpations: Abdomen is soft.      Tenderness: There is no abdominal tenderness.   Musculoskeletal:      Cervical back: Neck supple.   Skin:     General: Skin is warm.   Neurological:      General: No focal deficit present.      Mental Status: He is oriented to person, place, and time.   Psychiatric:         Mood and Affect: Mood normal.         Behavior: Behavior normal.       Significant Labs:  CBC:  Recent Labs   Lab 07/10/22  0210 07/11/22  0252 07/12/22  0743   WBC 5.69 5.95 5.02   RBC 3.45* 3.12* 3.18*   HGB 9.1* 8.3* 8.4*   HCT 30.0* 27.3* 27.7*    200 210   MCV 87 88 87   MCH 26.4* 26.6* 26.4*   MCHC 30.3* 30.4* 30.3*     BNP:  No results for input(s): BNP in the last 168 hours.    Invalid input(s): BNPTRIAGELBLO  CMP:  Recent Labs   Lab 07/12/22  0325 07/12/22  1904 07/13/22  0514   GLU 80 93 78   CALCIUM 9.6 9.0 9.3   * 132* 130*   K 4.0 4.0 3.7   CO2 23 26 22*    101 101   BUN 18 18 17   CREATININE 1.5* 1.7* 1.5*      Coagulation:    Recent Labs   Lab 07/11/22  0252 07/12/22 0325 07/13/22  0514   APTT 47.3* 39.0*  39.0* 36.3*     LDH:  Recent Labs   Lab 07/11/22  0252 07/12/22 0325 07/13/22  0514   LDH 1,043* 918* 752*     Microbiology:  Microbiology Results (last 7 days)       ** No results found for the last 168 hours. **            I have reviewed all pertinent labs within the past 24 hours.    Estimated Creatinine Clearance: 70.6 mL/min (A) (based on SCr of 1.5 mg/dL (H)).    Diagnostic Results:  I have reviewed and interpreted all pertinent imaging results/findings within the past 24 hours.    Assessment and Plan:     55 Y/O M with Hx of stage D HFrEF (EF 10%) due to NICM underwent HM2 implant 3/8/18 and exchange of outflow graft 3/9/18, Hx VT/VF s/p SICD 2014 presenting with gradual worsening shortness of breath associated with nonpleuritic, nonradiating bilateral 4/10 retrosternal chest pressure pain.  Symptoms began yesterday and have gradually worsened in the last 12 hours.  He does report going to a family picnic with increased consumption of chicken wings, ribs and other salty meat products.  Prior to symptom onset, he reports nausea, nonbloody diarrhea began yesterday and single episode of nonbloody nonbilious vomiting this morning. He also complains of dizziness, lightheadedness, and a mild HA. SOB worsened this morning prompting him to come to the ED.     In the ED, patient was in respiratory distress requiring BiPAP. MAP 96 and started on Nitro gtt. Work-up revealed WBC 10, K 5.4, Cr 2.5 (baseline 1.9), BNP 1950 (baseline 200-300s), Bili 2.1, LDH 1058, and INR 3.2. Bedside TTE with severely reduced EF, AV not opening, septum bulging into RV. Given IV Lasix 80 mg x1 with only 100-200 mL UOP and dark in color. HM2 interrogated and flows have been 8.5-9 L/min with no alarms or power surges. Cardiology consulted in the ED, dicussed with HTS, and decision made to admit to ICU for further mgmt.       * Left ventricular assist  device (LVAD) complication  -LDH is now downtrending, but there is continued concern for red cell shedding and pump thrombosis  -Haptoglobin low (although it has been chronically low)  -Direct and indirect wendy test are negative  -Plasma free hemoglobin elevated  - Integrilin with a Heparin drip were started until we have lab confirmation that this issue that resolved (normalization of LDH etc).  Integrilin increased to 2 mcg/kg/min on 7/8/22 and discontinued on 7/13.  -Continue Heparin drip  -Aspirin 325mg daily  -CTS planning for upgrade to [facility] [place] today    Procedure: Device Interrogation Including analysis of device parameters  Current Settings: Ventricular Assist Device    TXP LVAD INTERROGATIONS 7/13/2022 7/13/2022 7/13/2022 7/13/2022 7/13/2022 7/13/2022 7/13/2022   Type HeartMate II HeartMate II HeartMate II HeartMate II HeartMate II HeartMate II HeartMate II   Flow 7.2 7.2 7.1 7.2 7.1 7.4 7.0   Speed 9800 9800 9800 9800 9800 9800 9800   PI 2.8 2.7 2.7 2.6 2.6 2.5 2.7   Power (Jackson) 7.2 7.2 7.1 7.3 7.3 7.5 7.3   LSL 9400 9400 9400 9400 9400 9400 9400   Pulsatility Intermittent pulse Intermittent pulse Intermittent pulse Intermittent pulse Intermittent pulse Intermittent pulse Intermittent pulse       Anxiety  -Zolpidem night prn  -Alprazolam prn anxiety  -Psych was consulted, appreciate recs  -Continue Melatonin 6 mg QHS PRN  -Doppler pulses only (no BP cuff) at night to encourage sleep  -Exchanged patient's bed    History of ventricular tachycardia  Patient experienced an episode of VT on 6/30 and experienced an ICD shock thought to be related to hypokalemia (K of 3.1 on 6/30)  - Plan to aggressively replete K, keep K>4 and Mg>2  - Will continue mexiletine 25mg q8hrs and amiodarone 400mg daily    COVID-19  -Previously hypoxic, now off oxygen  -ID was consulted, recommended Remdesivir, course completed    Elevated serum lactate dehydrogenase (LDH)  Concerned for pump thrombosis  Cont to trend  Plan for pump  exchange today    amiodarone induced hypothyrodism  -Continue levothyroxine 112 mcg     LVAD (left ventricular assist device) present  Procedure: Device Interrogation Including analysis of device parameters  Current Settings: Ventricular Assist Device  Review of device function is stable    TXP LVAD INTERROGATIONS 7/13/2022 7/13/2022 7/13/2022 7/13/2022 7/13/2022 7/13/2022 7/13/2022   Type HeartMate II HeartMate II HeartMate II HeartMate II HeartMate II HeartMate II HeartMate II   Flow 7.2 7.2 7.1 7.2 7.1 7.4 7.0   Speed 9800 9800 9800 9800 9800 9800 9800   PI 2.8 2.7 2.7 2.6 2.6 2.5 2.7   Power (Jackson) 7.2 7.2 7.1 7.3 7.3 7.5 7.3   LSL 9400 9400 9400 9400 9400 9400 9400   Pulsatility Intermittent pulse Intermittent pulse Intermittent pulse Intermittent pulse Intermittent pulse Intermittent pulse Intermittent pulse       CKD (chronic kidney disease), stage III  WAGNER on CKD  Improved and back to patient's baseline of 1.5-2.0  - Avoiding nephrotoxic medications  - Continue to monitor  - Strict I/O's    Chronic combined systolic and diastolic heart failure  #ADHF  #NICMP  -Admitted with acute decompensated HF presence of HM2 LVAD  -Continue Amlodipine 10mg daily  -Holding Warfarin, Heparin started instead  -Speed echo at 9800 showed bowing to the right [place] [id number], IVS was at midline, MR decreased but worse AR  -Was previously on lasix gtt. Holding Lasix today          Harry Canales MD  Heart Transplant  [facility] - Cardiac Intensive [facility]

## 2022-07-14 ENCOUNTER — ANESTHESIA (OUTPATIENT)
Dept: SURGERY | Facility: HOSPITAL | Age: 57
DRG: 001 | End: 2022-07-14
Payer: COMMERCIAL

## 2022-07-14 ENCOUNTER — DOCUMENTATION ONLY (OUTPATIENT)
Dept: CARDIOLOGY | Facility: HOSPITAL | Age: 57
End: 2022-07-14
Payer: MEDICARE

## 2022-07-14 ENCOUNTER — ANESTHESIA EVENT (OUTPATIENT)
Dept: SURGERY | Facility: HOSPITAL | Age: 57
DRG: 001 | End: 2022-07-14
Payer: COMMERCIAL

## 2022-07-14 DIAGNOSIS — I42.0 DILATED CARDIOMYOPATHY: ICD-10-CM

## 2022-07-14 DIAGNOSIS — T82.9XXD COMPLICATION INVOLVING LEFT VENTRICULAR ASSIST DEVICE (LVAD), SUBSEQUENT ENCOUNTER: Primary | ICD-10-CM

## 2022-07-14 DIAGNOSIS — I50.42 CHRONIC COMBINED SYSTOLIC AND DIASTOLIC HEART FAILURE: ICD-10-CM

## 2022-07-14 LAB
ALBUMIN SERPL BCP-MCNC: 2 G/DL (ref 3.5–5.2)
ALBUMIN SERPL BCP-MCNC: 2.1 G/DL (ref 3.5–5.2)
ALBUMIN SERPL BCP-MCNC: 2.3 G/DL (ref 3.5–5.2)
ALBUMIN SERPL BCP-MCNC: 2.3 G/DL (ref 3.5–5.2)
ALBUMIN SERPL BCP-MCNC: 2.4 G/DL (ref 3.5–5.2)
ALLENS TEST: ABNORMAL
ALP SERPL-CCNC: 39 U/L (ref 55–135)
ALP SERPL-CCNC: 41 U/L (ref 55–135)
ALP SERPL-CCNC: 41 U/L (ref 55–135)
ALP SERPL-CCNC: 42 U/L (ref 55–135)
ALP SERPL-CCNC: 48 U/L (ref 55–135)
ALP SERPL-CCNC: 48 U/L (ref 55–135)
ALP SERPL-CCNC: 53 U/L (ref 55–135)
ALT SERPL W/O P-5'-P-CCNC: 109 U/L (ref 10–44)
ALT SERPL W/O P-5'-P-CCNC: 124 U/L (ref 10–44)
ALT SERPL W/O P-5'-P-CCNC: 132 U/L (ref 10–44)
ALT SERPL W/O P-5'-P-CCNC: 139 U/L (ref 10–44)
ALT SERPL W/O P-5'-P-CCNC: 155 U/L (ref 10–44)
ALT SERPL W/O P-5'-P-CCNC: 156 U/L (ref 10–44)
ALT SERPL W/O P-5'-P-CCNC: 79 U/L (ref 10–44)
ANION GAP SERPL CALC-SCNC: 11 MMOL/L (ref 8–16)
ANION GAP SERPL CALC-SCNC: 12 MMOL/L (ref 8–16)
ANION GAP SERPL CALC-SCNC: 16 MMOL/L (ref 8–16)
ANION GAP SERPL CALC-SCNC: 20 MMOL/L (ref 8–16)
ANION GAP SERPL CALC-SCNC: 8 MMOL/L (ref 8–16)
ANION GAP SERPL CALC-SCNC: 9 MMOL/L (ref 8–16)
ANION GAP SERPL CALC-SCNC: 9 MMOL/L (ref 8–16)
ANISOCYTOSIS BLD QL SMEAR: SLIGHT
APTT BLDCRRT: 31.6 SEC (ref 21–32)
APTT BLDCRRT: 32.3 SEC (ref 21–32)
APTT BLDCRRT: 32.5 SEC (ref 21–32)
APTT BLDCRRT: 33.4 SEC (ref 21–32)
APTT BLDCRRT: 33.9 SEC (ref 21–32)
APTT BLDCRRT: 41.3 SEC (ref 21–32)
AST SERPL-CCNC: 153 U/L (ref 10–40)
AST SERPL-CCNC: 221 U/L (ref 10–40)
AST SERPL-CCNC: 250 U/L (ref 10–40)
AST SERPL-CCNC: 262 U/L (ref 10–40)
AST SERPL-CCNC: 281 U/L (ref 10–40)
AST SERPL-CCNC: 291 U/L (ref 10–40)
AST SERPL-CCNC: 296 U/L (ref 10–40)
BASO STIPL BLD QL SMEAR: ABNORMAL
BASOPHILS # BLD AUTO: 0.01 K/UL (ref 0–0.2)
BASOPHILS # BLD AUTO: 0.02 K/UL (ref 0–0.2)
BASOPHILS # BLD AUTO: 0.02 K/UL (ref 0–0.2)
BASOPHILS # BLD AUTO: 0.03 K/UL (ref 0–0.2)
BASOPHILS # BLD AUTO: ABNORMAL K/UL (ref 0–0.2)
BASOPHILS NFR BLD: 0 % (ref 0–1.9)
BASOPHILS NFR BLD: 0.1 % (ref 0–1.9)
BILIRUB SERPL-MCNC: 2.5 MG/DL (ref 0.1–1)
BILIRUB SERPL-MCNC: 2.9 MG/DL (ref 0.1–1)
BILIRUB SERPL-MCNC: 3 MG/DL (ref 0.1–1)
BILIRUB SERPL-MCNC: 3.4 MG/DL (ref 0.1–1)
BILIRUB SERPL-MCNC: 3.5 MG/DL (ref 0.1–1)
BILIRUB SERPL-MCNC: 4.5 MG/DL (ref 0.1–1)
BILIRUB SERPL-MCNC: 4.9 MG/DL (ref 0.1–1)
BLD PROD TYP BPU: NORMAL
BLOOD UNIT EXPIRATION DATE: NORMAL
BLOOD UNIT TYPE CODE: 5100
BLOOD UNIT TYPE CODE: 7300
BLOOD UNIT TYPE CODE: 9500
BLOOD UNIT TYPE CODE: 9500
BLOOD UNIT TYPE CODE: NORMAL
BLOOD UNIT TYPE: NORMAL
BUN SERPL-MCNC: 19 MG/DL (ref 6–20)
BUN SERPL-MCNC: 20 MG/DL (ref 6–20)
BUN SERPL-MCNC: 21 MG/DL (ref 6–20)
BUN SERPL-MCNC: 21 MG/DL (ref 6–20)
BUN SERPL-MCNC: 22 MG/DL (ref 6–20)
BUN SERPL-MCNC: 24 MG/DL (ref 6–20)
BUN SERPL-MCNC: 25 MG/DL (ref 6–20)
CALCIUM SERPL-MCNC: 7.8 MG/DL (ref 8.7–10.5)
CALCIUM SERPL-MCNC: 7.9 MG/DL (ref 8.7–10.5)
CALCIUM SERPL-MCNC: 8.4 MG/DL (ref 8.7–10.5)
CALCIUM SERPL-MCNC: 8.9 MG/DL (ref 8.7–10.5)
CALCIUM SERPL-MCNC: 9.1 MG/DL (ref 8.7–10.5)
CHLORIDE SERPL-SCNC: 106 MMOL/L (ref 95–110)
CHLORIDE SERPL-SCNC: 106 MMOL/L (ref 95–110)
CHLORIDE SERPL-SCNC: 108 MMOL/L (ref 95–110)
CHLORIDE SERPL-SCNC: 108 MMOL/L (ref 95–110)
CHLORIDE SERPL-SCNC: 109 MMOL/L (ref 95–110)
CHLORIDE SERPL-SCNC: 109 MMOL/L (ref 95–110)
CHLORIDE SERPL-SCNC: 112 MMOL/L (ref 95–110)
CO2 SERPL-SCNC: 20 MMOL/L (ref 23–29)
CO2 SERPL-SCNC: 23 MMOL/L (ref 23–29)
CO2 SERPL-SCNC: 26 MMOL/L (ref 23–29)
CO2 SERPL-SCNC: 26 MMOL/L (ref 23–29)
CO2 SERPL-SCNC: 27 MMOL/L (ref 23–29)
CO2 SERPL-SCNC: 28 MMOL/L (ref 23–29)
CO2 SERPL-SCNC: 28 MMOL/L (ref 23–29)
CODING SYSTEM: NORMAL
CREAT SERPL-MCNC: 2.2 MG/DL (ref 0.5–1.4)
CREAT SERPL-MCNC: 2.4 MG/DL (ref 0.5–1.4)
CREAT SERPL-MCNC: 2.4 MG/DL (ref 0.5–1.4)
CREAT SERPL-MCNC: 2.5 MG/DL (ref 0.5–1.4)
CREAT SERPL-MCNC: 2.8 MG/DL (ref 0.5–1.4)
CREAT SERPL-MCNC: 2.8 MG/DL (ref 0.5–1.4)
CREAT SERPL-MCNC: 2.9 MG/DL (ref 0.5–1.4)
DELSYS: ABNORMAL
DIFFERENTIAL METHOD: ABNORMAL
DISPENSE STATUS: NORMAL
DOHLE BOD BLD QL SMEAR: PRESENT
EOSINOPHIL # BLD AUTO: 0 K/UL (ref 0–0.5)
EOSINOPHIL # BLD AUTO: ABNORMAL K/UL (ref 0–0.5)
EOSINOPHIL NFR BLD: 0 % (ref 0–8)
ERYTHROCYTE [DISTWIDTH] IN BLOOD BY AUTOMATED COUNT: 14.5 % (ref 11.5–14.5)
ERYTHROCYTE [DISTWIDTH] IN BLOOD BY AUTOMATED COUNT: 14.6 % (ref 11.5–14.5)
ERYTHROCYTE [DISTWIDTH] IN BLOOD BY AUTOMATED COUNT: 14.7 % (ref 11.5–14.5)
ERYTHROCYTE [DISTWIDTH] IN BLOOD BY AUTOMATED COUNT: 15 % (ref 11.5–14.5)
ERYTHROCYTE [DISTWIDTH] IN BLOOD BY AUTOMATED COUNT: 15 % (ref 11.5–14.5)
ERYTHROCYTE [DISTWIDTH] IN BLOOD BY AUTOMATED COUNT: 15.2 % (ref 11.5–14.5)
ERYTHROCYTE [DISTWIDTH] IN BLOOD BY AUTOMATED COUNT: 15.7 % (ref 11.5–14.5)
ERYTHROCYTE [SEDIMENTATION RATE] IN BLOOD BY WESTERGREN METHOD: 14 MM/H
EST. GFR  (AFRICAN AMERICAN): 26.9 ML/MIN/1.73 M^2
EST. GFR  (AFRICAN AMERICAN): 28.1 ML/MIN/1.73 M^2
EST. GFR  (AFRICAN AMERICAN): 28.1 ML/MIN/1.73 M^2
EST. GFR  (AFRICAN AMERICAN): 32.2 ML/MIN/1.73 M^2
EST. GFR  (AFRICAN AMERICAN): 33.8 ML/MIN/1.73 M^2
EST. GFR  (AFRICAN AMERICAN): 33.8 ML/MIN/1.73 M^2
EST. GFR  (AFRICAN AMERICAN): 37.6 ML/MIN/1.73 M^2
EST. GFR  (NON AFRICAN AMERICAN): 23.3 ML/MIN/1.73 M^2
EST. GFR  (NON AFRICAN AMERICAN): 24.3 ML/MIN/1.73 M^2
EST. GFR  (NON AFRICAN AMERICAN): 24.3 ML/MIN/1.73 M^2
EST. GFR  (NON AFRICAN AMERICAN): 27.9 ML/MIN/1.73 M^2
EST. GFR  (NON AFRICAN AMERICAN): 29.3 ML/MIN/1.73 M^2
EST. GFR  (NON AFRICAN AMERICAN): 29.3 ML/MIN/1.73 M^2
EST. GFR  (NON AFRICAN AMERICAN): 32.5 ML/MIN/1.73 M^2
FIBRINOGEN PPP-MCNC: 220 MG/DL (ref 182–400)
FIBRINOGEN PPP-MCNC: 236 MG/DL (ref 182–400)
FIBRINOGEN PPP-MCNC: 253 MG/DL (ref 182–400)
FIBRINOGEN PPP-MCNC: 290 MG/DL (ref 182–400)
FIBRINOGEN PPP-MCNC: 369 MG/DL (ref 182–400)
FIBRINOGEN PPP-MCNC: 415 MG/DL (ref 182–400)
FIO2: 1
FIO2: 100
FIO2: 80
GIANT PLATELETS BLD QL SMEAR: PRESENT
GLUCOSE SERPL-MCNC: 115 MG/DL (ref 70–110)
GLUCOSE SERPL-MCNC: 121 MG/DL (ref 70–110)
GLUCOSE SERPL-MCNC: 138 MG/DL (ref 70–110)
GLUCOSE SERPL-MCNC: 150 MG/DL (ref 70–110)
GLUCOSE SERPL-MCNC: 186 MG/DL (ref 70–110)
GLUCOSE SERPL-MCNC: 192 MG/DL (ref 70–110)
GLUCOSE SERPL-MCNC: 244 MG/DL (ref 70–110)
GLUCOSE SERPL-MCNC: 260 MG/DL (ref 70–110)
HCO3 UR-SCNC: 18.6 MMOL/L (ref 24–28)
HCO3 UR-SCNC: 18.6 MMOL/L (ref 24–28)
HCO3 UR-SCNC: 22.5 MMOL/L (ref 24–28)
HCO3 UR-SCNC: 23.2 MMOL/L (ref 24–28)
HCO3 UR-SCNC: 24.9 MMOL/L (ref 24–28)
HCO3 UR-SCNC: 25.3 MMOL/L (ref 24–28)
HCO3 UR-SCNC: 25.5 MMOL/L (ref 24–28)
HCO3 UR-SCNC: 25.5 MMOL/L (ref 24–28)
HCO3 UR-SCNC: 26.3 MMOL/L (ref 24–28)
HCO3 UR-SCNC: 26.4 MMOL/L (ref 24–28)
HCO3 UR-SCNC: 26.8 MMOL/L (ref 24–28)
HCO3 UR-SCNC: 26.9 MMOL/L (ref 24–28)
HCO3 UR-SCNC: 26.9 MMOL/L (ref 24–28)
HCO3 UR-SCNC: 27.1 MMOL/L (ref 24–28)
HCO3 UR-SCNC: 27.5 MMOL/L (ref 24–28)
HCO3 UR-SCNC: 28.9 MMOL/L (ref 24–28)
HCO3 UR-SCNC: 29 MMOL/L (ref 24–28)
HCO3 UR-SCNC: 29.1 MMOL/L (ref 24–28)
HCO3 UR-SCNC: 29.9 MMOL/L (ref 24–28)
HCO3 UR-SCNC: 30 MMOL/L (ref 24–28)
HCO3 UR-SCNC: 30.2 MMOL/L (ref 24–28)
HCO3 UR-SCNC: 31.2 MMOL/L (ref 24–28)
HCO3 UR-SCNC: 31.8 MMOL/L (ref 24–28)
HCO3 UR-SCNC: 31.8 MMOL/L (ref 24–28)
HCT VFR BLD AUTO: 25.7 % (ref 40–54)
HCT VFR BLD AUTO: 25.9 % (ref 40–54)
HCT VFR BLD AUTO: 26.4 % (ref 40–54)
HCT VFR BLD AUTO: 26.7 % (ref 40–54)
HCT VFR BLD AUTO: 28.4 % (ref 40–54)
HCT VFR BLD AUTO: 28.5 % (ref 40–54)
HCT VFR BLD AUTO: 28.8 % (ref 40–54)
HCT VFR BLD CALC: 18 %PCV (ref 36–54)
HCT VFR BLD CALC: 22 %PCV (ref 36–54)
HCT VFR BLD CALC: 22 %PCV (ref 36–54)
HCT VFR BLD CALC: 24 %PCV (ref 36–54)
HCT VFR BLD CALC: 25 %PCV (ref 36–54)
HCT VFR BLD CALC: 25 %PCV (ref 36–54)
HCT VFR BLD CALC: 26 %PCV (ref 36–54)
HCT VFR BLD CALC: 28 %PCV (ref 36–54)
HCT VFR BLD CALC: 29 %PCV (ref 36–54)
HCT VFR BLD CALC: 29 %PCV (ref 36–54)
HGB BLD-MCNC: 8.7 G/DL (ref 14–18)
HGB BLD-MCNC: 8.8 G/DL (ref 14–18)
HGB BLD-MCNC: 8.9 G/DL (ref 14–18)
HGB BLD-MCNC: 9 G/DL (ref 14–18)
HGB BLD-MCNC: 9.4 G/DL (ref 14–18)
HGB BLD-MCNC: 9.4 G/DL (ref 14–18)
HGB BLD-MCNC: 9.6 G/DL (ref 14–18)
HYPOCHROMIA BLD QL SMEAR: ABNORMAL
IMM GRANULOCYTES # BLD AUTO: 0.03 K/UL (ref 0–0.04)
IMM GRANULOCYTES # BLD AUTO: 0.04 K/UL (ref 0–0.04)
IMM GRANULOCYTES # BLD AUTO: 0.05 K/UL (ref 0–0.04)
IMM GRANULOCYTES # BLD AUTO: 0.06 K/UL (ref 0–0.04)
IMM GRANULOCYTES # BLD AUTO: 0.1 K/UL (ref 0–0.04)
IMM GRANULOCYTES # BLD AUTO: 0.13 K/UL (ref 0–0.04)
IMM GRANULOCYTES # BLD AUTO: ABNORMAL K/UL (ref 0–0.04)
IMM GRANULOCYTES NFR BLD AUTO: 0.2 % (ref 0–0.5)
IMM GRANULOCYTES NFR BLD AUTO: 0.3 % (ref 0–0.5)
IMM GRANULOCYTES NFR BLD AUTO: 0.4 % (ref 0–0.5)
IMM GRANULOCYTES NFR BLD AUTO: 0.4 % (ref 0–0.5)
IMM GRANULOCYTES NFR BLD AUTO: 0.5 % (ref 0–0.5)
IMM GRANULOCYTES NFR BLD AUTO: 0.8 % (ref 0–0.5)
IMM GRANULOCYTES NFR BLD AUTO: ABNORMAL % (ref 0–0.5)
INR PPP: 1.3 (ref 0.8–1.2)
INR PPP: 1.4 (ref 0.8–1.2)
LDH SERPL L TO P-CCNC: 11.42 MMOL/L (ref 0.36–1.25)
LDH SERPL L TO P-CCNC: 11.59 MMOL/L (ref 0.36–1.25)
LDH SERPL L TO P-CCNC: 2.45 MMOL/L (ref 0.36–1.25)
LDH SERPL L TO P-CCNC: 2.49 MMOL/L (ref 0.36–1.25)
LDH SERPL L TO P-CCNC: 2.63 MMOL/L (ref 0.36–1.25)
LDH SERPL L TO P-CCNC: 3.54 MMOL/L (ref 0.36–1.25)
LDH SERPL L TO P-CCNC: 3.73 MMOL/L (ref 0.36–1.25)
LDH SERPL L TO P-CCNC: 4.85 MMOL/L (ref 0.36–1.25)
LDH SERPL L TO P-CCNC: 5.6 MMOL/L (ref 0.36–1.25)
LDH SERPL L TO P-CCNC: 6.04 MMOL/L (ref 0.36–1.25)
LDH SERPL L TO P-CCNC: 6.44 MMOL/L (ref 0.36–1.25)
LDH SERPL L TO P-CCNC: 617 U/L (ref 110–260)
LDH SERPL L TO P-CCNC: 7.53 MMOL/L (ref 0.36–1.25)
LDH SERPL L TO P-CCNC: 7.85 MMOL/L (ref 0.36–1.25)
LDH SERPL L TO P-CCNC: 9.27 MMOL/L (ref 0.36–1.25)
LDH SERPL L TO P-CCNC: 9.88 MMOL/L (ref 0.36–1.25)
LYMPHOCYTES # BLD AUTO: 0.4 K/UL (ref 1–4.8)
LYMPHOCYTES # BLD AUTO: 0.5 K/UL (ref 1–4.8)
LYMPHOCYTES # BLD AUTO: 0.6 K/UL (ref 1–4.8)
LYMPHOCYTES # BLD AUTO: 0.6 K/UL (ref 1–4.8)
LYMPHOCYTES # BLD AUTO: ABNORMAL K/UL (ref 1–4.8)
LYMPHOCYTES NFR BLD: 2.9 % (ref 18–48)
LYMPHOCYTES NFR BLD: 3 % (ref 18–48)
LYMPHOCYTES NFR BLD: 3.1 % (ref 18–48)
LYMPHOCYTES NFR BLD: 3.2 % (ref 18–48)
LYMPHOCYTES NFR BLD: 3.3 % (ref 18–48)
LYMPHOCYTES NFR BLD: 3.3 % (ref 18–48)
LYMPHOCYTES NFR BLD: 6 % (ref 18–48)
MAGNESIUM SERPL-MCNC: 1.9 MG/DL (ref 1.6–2.6)
MAGNESIUM SERPL-MCNC: 2.3 MG/DL (ref 1.6–2.6)
MAGNESIUM SERPL-MCNC: 2.4 MG/DL (ref 1.6–2.6)
MCH RBC QN AUTO: 28.4 PG (ref 27–31)
MCH RBC QN AUTO: 28.9 PG (ref 27–31)
MCH RBC QN AUTO: 29.1 PG (ref 27–31)
MCH RBC QN AUTO: 29.1 PG (ref 27–31)
MCH RBC QN AUTO: 29.3 PG (ref 27–31)
MCHC RBC AUTO-ENTMCNC: 32.6 G/DL (ref 32–36)
MCHC RBC AUTO-ENTMCNC: 33.1 G/DL (ref 32–36)
MCHC RBC AUTO-ENTMCNC: 33.3 G/DL (ref 32–36)
MCHC RBC AUTO-ENTMCNC: 33.7 G/DL (ref 32–36)
MCHC RBC AUTO-ENTMCNC: 33.7 G/DL (ref 32–36)
MCHC RBC AUTO-ENTMCNC: 33.9 G/DL (ref 32–36)
MCHC RBC AUTO-ENTMCNC: 34.4 G/DL (ref 32–36)
MCV RBC AUTO: 85 FL (ref 82–98)
MCV RBC AUTO: 85 FL (ref 82–98)
MCV RBC AUTO: 86 FL (ref 82–98)
MCV RBC AUTO: 87 FL (ref 82–98)
MCV RBC AUTO: 89 FL (ref 82–98)
METAMYELOCYTES NFR BLD MANUAL: 1 %
METHEMOGLOBIN: 0.2 % (ref 0–3)
MODE: ABNORMAL
MONOCYTES # BLD AUTO: 1 K/UL (ref 0.3–1)
MONOCYTES # BLD AUTO: 1.2 K/UL (ref 0.3–1)
MONOCYTES # BLD AUTO: 1.3 K/UL (ref 0.3–1)
MONOCYTES # BLD AUTO: 1.7 K/UL (ref 0.3–1)
MONOCYTES # BLD AUTO: 2 K/UL (ref 0.3–1)
MONOCYTES # BLD AUTO: 2.2 K/UL (ref 0.3–1)
MONOCYTES # BLD AUTO: ABNORMAL K/UL (ref 0.3–1)
MONOCYTES NFR BLD: 10.5 % (ref 4–15)
MONOCYTES NFR BLD: 13.3 % (ref 4–15)
MONOCYTES NFR BLD: 7 % (ref 4–15)
MONOCYTES NFR BLD: 7.8 % (ref 4–15)
MONOCYTES NFR BLD: 9 % (ref 4–15)
MONOCYTES NFR BLD: 9.1 % (ref 4–15)
MONOCYTES NFR BLD: 9.8 % (ref 4–15)
NEUTROPHILS # BLD AUTO: 11.2 K/UL (ref 1.8–7.7)
NEUTROPHILS # BLD AUTO: 11.8 K/UL (ref 1.8–7.7)
NEUTROPHILS # BLD AUTO: 12.1 K/UL (ref 1.8–7.7)
NEUTROPHILS # BLD AUTO: 12.6 K/UL (ref 1.8–7.7)
NEUTROPHILS # BLD AUTO: 14.8 K/UL (ref 1.8–7.7)
NEUTROPHILS # BLD AUTO: 17.6 K/UL (ref 1.8–7.7)
NEUTROPHILS NFR BLD: 71 % (ref 38–73)
NEUTROPHILS NFR BLD: 83 % (ref 38–73)
NEUTROPHILS NFR BLD: 85.8 % (ref 38–73)
NEUTROPHILS NFR BLD: 86 % (ref 38–73)
NEUTROPHILS NFR BLD: 87.4 % (ref 38–73)
NEUTROPHILS NFR BLD: 87.7 % (ref 38–73)
NEUTROPHILS NFR BLD: 88.6 % (ref 38–73)
NEUTS BAND NFR BLD MANUAL: 15 %
NRBC BLD-RTO: 0 /100 WBC
NUM UNITS TRANS PACKED RBC: NORMAL
OVALOCYTES BLD QL SMEAR: ABNORMAL
PCO2 BLDA: 38.9 MMHG (ref 35–45)
PCO2 BLDA: 39.5 MMHG (ref 35–45)
PCO2 BLDA: 41.6 MMHG (ref 35–45)
PCO2 BLDA: 43.8 MMHG (ref 35–45)
PCO2 BLDA: 45 MMHG (ref 35–45)
PCO2 BLDA: 45.5 MMHG (ref 35–45)
PCO2 BLDA: 45.5 MMHG (ref 35–45)
PCO2 BLDA: 45.6 MMHG (ref 35–45)
PCO2 BLDA: 47.3 MMHG (ref 35–45)
PCO2 BLDA: 47.7 MMHG (ref 35–45)
PCO2 BLDA: 47.8 MMHG (ref 35–45)
PCO2 BLDA: 48.4 MMHG (ref 35–45)
PCO2 BLDA: 49.5 MMHG (ref 35–45)
PCO2 BLDA: 49.7 MMHG (ref 35–45)
PCO2 BLDA: 50 MMHG (ref 35–45)
PCO2 BLDA: 51 MMHG (ref 35–45)
PCO2 BLDA: 51.3 MMHG (ref 35–45)
PCO2 BLDA: 51.4 MMHG (ref 35–45)
PCO2 BLDA: 51.5 MMHG (ref 35–45)
PCO2 BLDA: 52.1 MMHG (ref 35–45)
PCO2 BLDA: 53.5 MMHG (ref 35–45)
PCO2 BLDA: 54.6 MMHG (ref 35–45)
PCO2 BLDA: 54.7 MMHG (ref 35–45)
PCO2 BLDA: 61.1 MMHG (ref 35–45)
PEEP: 8
PH SMN: 7.22 [PH] (ref 7.35–7.45)
PH SMN: 7.23 [PH] (ref 7.35–7.45)
PH SMN: 7.24 [PH] (ref 7.35–7.45)
PH SMN: 7.26 [PH] (ref 7.35–7.45)
PH SMN: 7.26 [PH] (ref 7.35–7.45)
PH SMN: 7.28 [PH] (ref 7.35–7.45)
PH SMN: 7.29 [PH] (ref 7.35–7.45)
PH SMN: 7.29 [PH] (ref 7.35–7.45)
PH SMN: 7.33 [PH] (ref 7.35–7.45)
PH SMN: 7.35 [PH] (ref 7.35–7.45)
PH SMN: 7.35 [PH] (ref 7.35–7.45)
PH SMN: 7.39 [PH] (ref 7.35–7.45)
PH SMN: 7.41 [PH] (ref 7.35–7.45)
PH SMN: 7.41 [PH] (ref 7.35–7.45)
PH SMN: 7.42 [PH] (ref 7.35–7.45)
PH SMN: 7.43 [PH] (ref 7.35–7.45)
PH SMN: 7.47 [PH] (ref 7.35–7.45)
PH SMN: 7.47 [PH] (ref 7.35–7.45)
PHOSPHATE SERPL-MCNC: 2.7 MG/DL (ref 2.7–4.5)
PHOSPHATE SERPL-MCNC: 2.9 MG/DL (ref 2.7–4.5)
PHOSPHATE SERPL-MCNC: 3 MG/DL (ref 2.7–4.5)
PHOSPHATE SERPL-MCNC: 4.4 MG/DL (ref 2.7–4.5)
PHOSPHATE SERPL-MCNC: 5.5 MG/DL (ref 2.7–4.5)
PLATELET # BLD AUTO: 106 K/UL (ref 150–450)
PLATELET # BLD AUTO: 117 K/UL (ref 150–450)
PLATELET # BLD AUTO: 126 K/UL (ref 150–450)
PLATELET # BLD AUTO: 133 K/UL (ref 150–450)
PLATELET # BLD AUTO: 90 K/UL (ref 150–450)
PLATELET # BLD AUTO: 93 K/UL (ref 150–450)
PLATELET # BLD AUTO: 93 K/UL (ref 150–450)
PLATELET BLD QL SMEAR: ABNORMAL
PMV BLD AUTO: 10.4 FL (ref 9.2–12.9)
PMV BLD AUTO: 10.4 FL (ref 9.2–12.9)
PMV BLD AUTO: 10.5 FL (ref 9.2–12.9)
PMV BLD AUTO: 10.7 FL (ref 9.2–12.9)
PMV BLD AUTO: 10.8 FL (ref 9.2–12.9)
PMV BLD AUTO: 10.9 FL (ref 9.2–12.9)
PMV BLD AUTO: 11.3 FL (ref 9.2–12.9)
PO2 BLDA: 111 MMHG (ref 80–100)
PO2 BLDA: 116 MMHG (ref 80–100)
PO2 BLDA: 120 MMHG (ref 80–100)
PO2 BLDA: 121 MMHG (ref 80–100)
PO2 BLDA: 125 MMHG (ref 80–100)
PO2 BLDA: 125 MMHG (ref 80–100)
PO2 BLDA: 134 MMHG (ref 80–100)
PO2 BLDA: 137 MMHG (ref 80–100)
PO2 BLDA: 141 MMHG (ref 80–100)
PO2 BLDA: 145 MMHG (ref 80–100)
PO2 BLDA: 158 MMHG (ref 80–100)
PO2 BLDA: 347 MMHG (ref 80–100)
PO2 BLDA: 388 MMHG (ref 80–100)
PO2 BLDA: 40 MMHG (ref 40–60)
PO2 BLDA: 42 MMHG (ref 40–60)
PO2 BLDA: 43 MMHG (ref 40–60)
PO2 BLDA: 48 MMHG (ref 40–60)
PO2 BLDA: 51 MMHG (ref 40–60)
PO2 BLDA: 514 MMHG (ref 80–100)
PO2 BLDA: 69 MMHG (ref 80–100)
PO2 BLDA: 85 MMHG (ref 80–100)
PO2 BLDA: 91 MMHG (ref 80–100)
PO2 BLDA: 99 MMHG (ref 80–100)
PO2 BLDA: 99 MMHG (ref 80–100)
POC BE: -1 MMOL/L
POC BE: -2 MMOL/L
POC BE: -2 MMOL/L
POC BE: -4 MMOL/L
POC BE: -5 MMOL/L
POC BE: -8 MMOL/L
POC BE: -9 MMOL/L
POC BE: 0 MMOL/L
POC BE: 0 MMOL/L
POC BE: 1 MMOL/L
POC BE: 2 MMOL/L
POC BE: 4 MMOL/L
POC BE: 5 MMOL/L
POC BE: 6 MMOL/L
POC BE: 7 MMOL/L
POC BE: 7 MMOL/L
POC BE: 8 MMOL/L
POC IONIZED CALCIUM: 1.07 MMOL/L (ref 1.06–1.42)
POC IONIZED CALCIUM: 1.1 MMOL/L (ref 1.06–1.42)
POC IONIZED CALCIUM: 1.14 MMOL/L (ref 1.06–1.42)
POC IONIZED CALCIUM: 1.15 MMOL/L (ref 1.06–1.42)
POC IONIZED CALCIUM: 1.16 MMOL/L (ref 1.06–1.42)
POC IONIZED CALCIUM: 1.17 MMOL/L (ref 1.06–1.42)
POC IONIZED CALCIUM: 1.18 MMOL/L (ref 1.06–1.42)
POC IONIZED CALCIUM: 1.2 MMOL/L (ref 1.06–1.42)
POC IONIZED CALCIUM: 1.22 MMOL/L (ref 1.06–1.42)
POC IONIZED CALCIUM: 1.24 MMOL/L (ref 1.06–1.42)
POC IONIZED CALCIUM: 1.25 MMOL/L (ref 1.06–1.42)
POC IONIZED CALCIUM: 1.26 MMOL/L (ref 1.06–1.42)
POC IONIZED CALCIUM: 1.28 MMOL/L (ref 1.06–1.42)
POC IONIZED CALCIUM: 1.29 MMOL/L (ref 1.06–1.42)
POC IONIZED CALCIUM: 1.31 MMOL/L (ref 1.06–1.42)
POC IONIZED CALCIUM: 1.31 MMOL/L (ref 1.06–1.42)
POC SATURATED O2: 100 % (ref 95–100)
POC SATURATED O2: 70 % (ref 95–100)
POC SATURATED O2: 73 % (ref 95–100)
POC SATURATED O2: 74 % (ref 95–100)
POC SATURATED O2: 78 % (ref 95–100)
POC SATURATED O2: 83 % (ref 95–100)
POC SATURATED O2: 95 % (ref 95–100)
POC SATURATED O2: 96 % (ref 95–100)
POC SATURATED O2: 96 % (ref 95–100)
POC SATURATED O2: 97 % (ref 95–100)
POC SATURATED O2: 98 % (ref 95–100)
POC SATURATED O2: 99 % (ref 95–100)
POC TCO2: 20 MMOL/L (ref 23–27)
POC TCO2: 20 MMOL/L (ref 24–29)
POC TCO2: 24 MMOL/L (ref 23–27)
POC TCO2: 25 MMOL/L (ref 23–27)
POC TCO2: 26 MMOL/L (ref 23–27)
POC TCO2: 27 MMOL/L (ref 23–27)
POC TCO2: 28 MMOL/L (ref 23–27)
POC TCO2: 28 MMOL/L (ref 24–29)
POC TCO2: 29 MMOL/L (ref 23–27)
POC TCO2: 29 MMOL/L (ref 24–29)
POC TCO2: 30 MMOL/L (ref 23–27)
POC TCO2: 30 MMOL/L (ref 23–27)
POC TCO2: 31 MMOL/L (ref 23–27)
POC TCO2: 31 MMOL/L (ref 23–27)
POC TCO2: 31 MMOL/L (ref 24–29)
POC TCO2: 32 MMOL/L (ref 23–27)
POC TCO2: 33 MMOL/L (ref 23–27)
POC TCO2: 33 MMOL/L (ref 23–27)
POC TCO2: 33 MMOL/L (ref 24–29)
POCT GLUCOSE: 106 MG/DL (ref 70–110)
POCT GLUCOSE: 117 MG/DL (ref 70–110)
POCT GLUCOSE: 127 MG/DL (ref 70–110)
POCT GLUCOSE: 135 MG/DL (ref 70–110)
POCT GLUCOSE: 136 MG/DL (ref 70–110)
POCT GLUCOSE: 137 MG/DL (ref 70–110)
POCT GLUCOSE: 138 MG/DL (ref 70–110)
POCT GLUCOSE: 140 MG/DL (ref 70–110)
POCT GLUCOSE: 143 MG/DL (ref 70–110)
POCT GLUCOSE: 143 MG/DL (ref 70–110)
POCT GLUCOSE: 147 MG/DL (ref 70–110)
POCT GLUCOSE: 158 MG/DL (ref 70–110)
POCT GLUCOSE: 184 MG/DL (ref 70–110)
POCT GLUCOSE: 205 MG/DL (ref 70–110)
POCT GLUCOSE: 205 MG/DL (ref 70–110)
POCT GLUCOSE: 209 MG/DL (ref 70–110)
POCT GLUCOSE: 228 MG/DL (ref 70–110)
POCT GLUCOSE: 238 MG/DL (ref 70–110)
POCT GLUCOSE: 245 MG/DL (ref 70–110)
POCT GLUCOSE: 268 MG/DL (ref 70–110)
POCT GLUCOSE: 272 MG/DL (ref 70–110)
POCT GLUCOSE: 273 MG/DL (ref 70–110)
POIKILOCYTOSIS BLD QL SMEAR: SLIGHT
POIKILOCYTOSIS BLD QL SMEAR: SLIGHT
POLYCHROMASIA BLD QL SMEAR: ABNORMAL
POLYCHROMASIA BLD QL SMEAR: ABNORMAL
POTASSIUM BLD-SCNC: 2.7 MMOL/L (ref 3.5–5.1)
POTASSIUM BLD-SCNC: 3.1 MMOL/L (ref 3.5–5.1)
POTASSIUM BLD-SCNC: 3.3 MMOL/L (ref 3.5–5.1)
POTASSIUM BLD-SCNC: 3.4 MMOL/L (ref 3.5–5.1)
POTASSIUM BLD-SCNC: 3.4 MMOL/L (ref 3.5–5.1)
POTASSIUM BLD-SCNC: 3.5 MMOL/L (ref 3.5–5.1)
POTASSIUM BLD-SCNC: 3.6 MMOL/L (ref 3.5–5.1)
POTASSIUM BLD-SCNC: 3.7 MMOL/L (ref 3.5–5.1)
POTASSIUM BLD-SCNC: 3.8 MMOL/L (ref 3.5–5.1)
POTASSIUM BLD-SCNC: 3.8 MMOL/L (ref 3.5–5.1)
POTASSIUM BLD-SCNC: 3.9 MMOL/L (ref 3.5–5.1)
POTASSIUM BLD-SCNC: 4.1 MMOL/L (ref 3.5–5.1)
POTASSIUM BLD-SCNC: 4.8 MMOL/L (ref 3.5–5.1)
POTASSIUM BLD-SCNC: 5 MMOL/L (ref 3.5–5.1)
POTASSIUM BLD-SCNC: 5 MMOL/L (ref 3.5–5.1)
POTASSIUM BLD-SCNC: 5.1 MMOL/L (ref 3.5–5.1)
POTASSIUM SERPL-SCNC: 3.6 MMOL/L (ref 3.5–5.1)
POTASSIUM SERPL-SCNC: 3.7 MMOL/L (ref 3.5–5.1)
POTASSIUM SERPL-SCNC: 3.9 MMOL/L (ref 3.5–5.1)
POTASSIUM SERPL-SCNC: 4 MMOL/L (ref 3.5–5.1)
POTASSIUM SERPL-SCNC: 4.1 MMOL/L (ref 3.5–5.1)
POTASSIUM SERPL-SCNC: 5 MMOL/L (ref 3.5–5.1)
POTASSIUM SERPL-SCNC: 5.3 MMOL/L (ref 3.5–5.1)
PROT SERPL-MCNC: 3.4 G/DL (ref 6–8.4)
PROT SERPL-MCNC: 3.6 G/DL (ref 6–8.4)
PROT SERPL-MCNC: 3.8 G/DL (ref 6–8.4)
PROT SERPL-MCNC: 3.8 G/DL (ref 6–8.4)
PROT SERPL-MCNC: 4 G/DL (ref 6–8.4)
PROT SERPL-MCNC: 4.1 G/DL (ref 6–8.4)
PROT SERPL-MCNC: 4.2 G/DL (ref 6–8.4)
PROTHROMBIN TIME: 13.5 SEC (ref 9–12.5)
PROTHROMBIN TIME: 13.6 SEC (ref 9–12.5)
PROTHROMBIN TIME: 13.7 SEC (ref 9–12.5)
PROTHROMBIN TIME: 14 SEC (ref 9–12.5)
PROTHROMBIN TIME: 14.1 SEC (ref 9–12.5)
PROTHROMBIN TIME: 14.3 SEC (ref 9–12.5)
RBC # BLD AUTO: 3.01 M/UL (ref 4.6–6.2)
RBC # BLD AUTO: 3.02 M/UL (ref 4.6–6.2)
RBC # BLD AUTO: 3.04 M/UL (ref 4.6–6.2)
RBC # BLD AUTO: 3.09 M/UL (ref 4.6–6.2)
RBC # BLD AUTO: 3.21 M/UL (ref 4.6–6.2)
RBC # BLD AUTO: 3.28 M/UL (ref 4.6–6.2)
RBC # BLD AUTO: 3.31 M/UL (ref 4.6–6.2)
SAMPLE: ABNORMAL
SITE: ABNORMAL
SMUDGE CELLS BLD QL SMEAR: PRESENT
SODIUM BLD-SCNC: 135 MMOL/L (ref 136–145)
SODIUM BLD-SCNC: 145 MMOL/L (ref 136–145)
SODIUM BLD-SCNC: 146 MMOL/L (ref 136–145)
SODIUM BLD-SCNC: 146 MMOL/L (ref 136–145)
SODIUM BLD-SCNC: 147 MMOL/L (ref 136–145)
SODIUM BLD-SCNC: 148 MMOL/L (ref 136–145)
SODIUM BLD-SCNC: 151 MMOL/L (ref 136–145)
SODIUM SERPL-SCNC: 143 MMOL/L (ref 136–145)
SODIUM SERPL-SCNC: 143 MMOL/L (ref 136–145)
SODIUM SERPL-SCNC: 146 MMOL/L (ref 136–145)
SODIUM SERPL-SCNC: 146 MMOL/L (ref 136–145)
SODIUM SERPL-SCNC: 147 MMOL/L (ref 136–145)
SODIUM SERPL-SCNC: 147 MMOL/L (ref 136–145)
SODIUM SERPL-SCNC: 149 MMOL/L (ref 136–145)
TARGETS BLD QL SMEAR: ABNORMAL
TOXIC GRANULES BLD QL SMEAR: ABNORMAL
TRIGL SERPL-MCNC: 43 MG/DL (ref 30–150)
UNIT NUMBER: NORMAL
VANCOMYCIN SERPL-MCNC: 16.7 UG/ML
VT: 400
WBC # BLD AUTO: 11.83 K/UL (ref 3.9–12.7)
WBC # BLD AUTO: 12.59 K/UL (ref 3.9–12.7)
WBC # BLD AUTO: 13.44 K/UL (ref 3.9–12.7)
WBC # BLD AUTO: 13.86 K/UL (ref 3.9–12.7)
WBC # BLD AUTO: 15.14 K/UL (ref 3.9–12.7)
WBC # BLD AUTO: 17.23 K/UL (ref 3.9–12.7)
WBC # BLD AUTO: 20.54 K/UL (ref 3.9–12.7)
WBC TOXIC VACUOLES BLD QL SMEAR: PRESENT

## 2022-07-14 PROCEDURE — 83615 LACTATE (LD) (LDH) ENZYME: CPT | Performed by: STUDENT IN AN ORGANIZED HEALTH CARE EDUCATION/TRAINING PROGRAM

## 2022-07-14 PROCEDURE — P9035 PLATELET PHERES LEUKOREDUCED: HCPCS | Performed by: STUDENT IN AN ORGANIZED HEALTH CARE EDUCATION/TRAINING PROGRAM

## 2022-07-14 PROCEDURE — 36000708 HC OR TIME LEV III 1ST 15 MIN: Performed by: THORACIC SURGERY (CARDIOTHORACIC VASCULAR SURGERY)

## 2022-07-14 PROCEDURE — 83735 ASSAY OF MAGNESIUM: CPT | Performed by: STUDENT IN AN ORGANIZED HEALTH CARE EDUCATION/TRAINING PROGRAM

## 2022-07-14 PROCEDURE — 63600367 HC NITRIC OXIDE PER HOUR

## 2022-07-14 PROCEDURE — 99291 PR CRITICAL CARE, E/M 30-74 MINUTES: ICD-10-PCS | Mod: ,,, | Performed by: ANESTHESIOLOGY

## 2022-07-14 PROCEDURE — 63600175 PHARM REV CODE 636 W HCPCS: Performed by: STUDENT IN AN ORGANIZED HEALTH CARE EDUCATION/TRAINING PROGRAM

## 2022-07-14 PROCEDURE — C9113 INJ PANTOPRAZOLE SODIUM, VIA: HCPCS | Performed by: STUDENT IN AN ORGANIZED HEALTH CARE EDUCATION/TRAINING PROGRAM

## 2022-07-14 PROCEDURE — P9012 CRYOPRECIPITATE EACH UNIT: HCPCS | Performed by: THORACIC SURGERY (CARDIOTHORACIC VASCULAR SURGERY)

## 2022-07-14 PROCEDURE — 36620 INSERTION CATHETER ARTERY: CPT

## 2022-07-14 PROCEDURE — 82330 ASSAY OF CALCIUM: CPT

## 2022-07-14 PROCEDURE — 83605 ASSAY OF LACTIC ACID: CPT

## 2022-07-14 PROCEDURE — 99900035 HC TECH TIME PER 15 MIN (STAT)

## 2022-07-14 PROCEDURE — 85025 COMPLETE CBC W/AUTO DIFF WBC: CPT | Performed by: STUDENT IN AN ORGANIZED HEALTH CARE EDUCATION/TRAINING PROGRAM

## 2022-07-14 PROCEDURE — P9017 PLASMA 1 DONOR FRZ W/IN 8 HR: HCPCS | Performed by: THORACIC SURGERY (CARDIOTHORACIC VASCULAR SURGERY)

## 2022-07-14 PROCEDURE — P9045 ALBUMIN (HUMAN), 5%, 250 ML: HCPCS | Mod: JG | Performed by: STUDENT IN AN ORGANIZED HEALTH CARE EDUCATION/TRAINING PROGRAM

## 2022-07-14 PROCEDURE — 37000008 HC ANESTHESIA 1ST 15 MINUTES: Performed by: THORACIC SURGERY (CARDIOTHORACIC VASCULAR SURGERY)

## 2022-07-14 PROCEDURE — 82803 BLOOD GASES ANY COMBINATION: CPT

## 2022-07-14 PROCEDURE — P9017 PLASMA 1 DONOR FRZ W/IN 8 HR: HCPCS | Performed by: STUDENT IN AN ORGANIZED HEALTH CARE EDUCATION/TRAINING PROGRAM

## 2022-07-14 PROCEDURE — 85610 PROTHROMBIN TIME: CPT | Mod: 91 | Performed by: STUDENT IN AN ORGANIZED HEALTH CARE EDUCATION/TRAINING PROGRAM

## 2022-07-14 PROCEDURE — C1729 CATH, DRAINAGE: HCPCS | Performed by: THORACIC SURGERY (CARDIOTHORACIC VASCULAR SURGERY)

## 2022-07-14 PROCEDURE — P9016 RBC LEUKOCYTES REDUCED: HCPCS | Performed by: STUDENT IN AN ORGANIZED HEALTH CARE EDUCATION/TRAINING PROGRAM

## 2022-07-14 PROCEDURE — 25000242 PHARM REV CODE 250 ALT 637 W/ HCPCS: Performed by: STUDENT IN AN ORGANIZED HEALTH CARE EDUCATION/TRAINING PROGRAM

## 2022-07-14 PROCEDURE — 85610 PROTHROMBIN TIME: CPT | Mod: 91 | Performed by: THORACIC SURGERY (CARDIOTHORACIC VASCULAR SURGERY)

## 2022-07-14 PROCEDURE — 85007 BL SMEAR W/DIFF WBC COUNT: CPT | Performed by: STUDENT IN AN ORGANIZED HEALTH CARE EDUCATION/TRAINING PROGRAM

## 2022-07-14 PROCEDURE — 83735 ASSAY OF MAGNESIUM: CPT | Mod: 91 | Performed by: THORACIC SURGERY (CARDIOTHORACIC VASCULAR SURGERY)

## 2022-07-14 PROCEDURE — 37000009 HC ANESTHESIA EA ADD 15 MINS: Performed by: THORACIC SURGERY (CARDIOTHORACIC VASCULAR SURGERY)

## 2022-07-14 PROCEDURE — 80202 ASSAY OF VANCOMYCIN: CPT | Performed by: THORACIC SURGERY (CARDIOTHORACIC VASCULAR SURGERY)

## 2022-07-14 PROCEDURE — 88300 SURGICAL PATH GROSS: CPT | Mod: 26,,, | Performed by: PATHOLOGY

## 2022-07-14 PROCEDURE — 94761 N-INVAS EAR/PLS OXIMETRY MLT: CPT

## 2022-07-14 PROCEDURE — 85384 FIBRINOGEN ACTIVITY: CPT | Mod: 91 | Performed by: STUDENT IN AN ORGANIZED HEALTH CARE EDUCATION/TRAINING PROGRAM

## 2022-07-14 PROCEDURE — 27000248 HC VAD-ADDITIONAL DAY

## 2022-07-14 PROCEDURE — 88300 SURGICAL PATH GROSS: CPT | Performed by: PATHOLOGY

## 2022-07-14 PROCEDURE — 84295 ASSAY OF SERUM SODIUM: CPT

## 2022-07-14 PROCEDURE — 93312 TEE: ICD-10-PCS | Mod: 26,,, | Performed by: ANESTHESIOLOGY

## 2022-07-14 PROCEDURE — 37799 UNLISTED PX VASCULAR SURGERY: CPT

## 2022-07-14 PROCEDURE — 93312 ECHO TRANSESOPHAGEAL: CPT | Mod: 26,,, | Performed by: ANESTHESIOLOGY

## 2022-07-14 PROCEDURE — 83050 HGB METHEMOGLOBIN QUAN: CPT

## 2022-07-14 PROCEDURE — 25000003 PHARM REV CODE 250: Performed by: STUDENT IN AN ORGANIZED HEALTH CARE EDUCATION/TRAINING PROGRAM

## 2022-07-14 PROCEDURE — 84100 ASSAY OF PHOSPHORUS: CPT | Mod: 91 | Performed by: THORACIC SURGERY (CARDIOTHORACIC VASCULAR SURGERY)

## 2022-07-14 PROCEDURE — P9012 CRYOPRECIPITATE EACH UNIT: HCPCS | Performed by: ANESTHESIOLOGY

## 2022-07-14 PROCEDURE — 85730 THROMBOPLASTIN TIME PARTIAL: CPT | Mod: 91 | Performed by: THORACIC SURGERY (CARDIOTHORACIC VASCULAR SURGERY)

## 2022-07-14 PROCEDURE — 27000221 HC OXYGEN, UP TO 24 HOURS

## 2022-07-14 PROCEDURE — 36592 COLLECT BLOOD FROM PICC: CPT

## 2022-07-14 PROCEDURE — 63600175 PHARM REV CODE 636 W HCPCS

## 2022-07-14 PROCEDURE — 39010 PR MEDIASTINOSCOPY, CHST APPROACH: ICD-10-PCS | Mod: 78,,, | Performed by: THORACIC SURGERY (CARDIOTHORACIC VASCULAR SURGERY)

## 2022-07-14 PROCEDURE — 85014 HEMATOCRIT: CPT

## 2022-07-14 PROCEDURE — 20000000 HC ICU ROOM

## 2022-07-14 PROCEDURE — P9016 RBC LEUKOCYTES REDUCED: HCPCS | Performed by: ANESTHESIOLOGY

## 2022-07-14 PROCEDURE — 84132 ASSAY OF SERUM POTASSIUM: CPT

## 2022-07-14 PROCEDURE — 86965 POOLING BLOOD PLATELETS: CPT | Performed by: ANESTHESIOLOGY

## 2022-07-14 PROCEDURE — 27201423 OPTIME MED/SURG SUP & DEVICES STERILE SUPPLY: Performed by: THORACIC SURGERY (CARDIOTHORACIC VASCULAR SURGERY)

## 2022-07-14 PROCEDURE — 84478 ASSAY OF TRIGLYCERIDES: CPT | Performed by: THORACIC SURGERY (CARDIOTHORACIC VASCULAR SURGERY)

## 2022-07-14 PROCEDURE — 86965 POOLING BLOOD PLATELETS: CPT | Performed by: THORACIC SURGERY (CARDIOTHORACIC VASCULAR SURGERY)

## 2022-07-14 PROCEDURE — 80053 COMPREHEN METABOLIC PANEL: CPT | Mod: 91 | Performed by: THORACIC SURGERY (CARDIOTHORACIC VASCULAR SURGERY)

## 2022-07-14 PROCEDURE — 85730 THROMBOPLASTIN TIME PARTIAL: CPT | Mod: 91 | Performed by: STUDENT IN AN ORGANIZED HEALTH CARE EDUCATION/TRAINING PROGRAM

## 2022-07-14 PROCEDURE — 36430 TRANSFUSION BLD/BLD COMPNT: CPT

## 2022-07-14 PROCEDURE — 88300 PR  SURG PATH,GROSS,LEVEL I: ICD-10-PCS | Mod: 26,,, | Performed by: PATHOLOGY

## 2022-07-14 PROCEDURE — 39010 EXPLORATION OF CHEST: CPT | Mod: 78,,, | Performed by: THORACIC SURGERY (CARDIOTHORACIC VASCULAR SURGERY)

## 2022-07-14 PROCEDURE — D9220A PRA ANESTHESIA: ICD-10-PCS | Mod: ,,, | Performed by: ANESTHESIOLOGY

## 2022-07-14 PROCEDURE — 27201598 HC CASSETTE, BLOOD WARMER

## 2022-07-14 PROCEDURE — 25000003 PHARM REV CODE 250: Performed by: THORACIC SURGERY (CARDIOTHORACIC VASCULAR SURGERY)

## 2022-07-14 PROCEDURE — 99900026 HC AIRWAY MAINTENANCE (STAT)

## 2022-07-14 PROCEDURE — 84100 ASSAY OF PHOSPHORUS: CPT | Performed by: STUDENT IN AN ORGANIZED HEALTH CARE EDUCATION/TRAINING PROGRAM

## 2022-07-14 PROCEDURE — 94640 AIRWAY INHALATION TREATMENT: CPT

## 2022-07-14 PROCEDURE — 85025 COMPLETE CBC W/AUTO DIFF WBC: CPT | Mod: 91 | Performed by: THORACIC SURGERY (CARDIOTHORACIC VASCULAR SURGERY)

## 2022-07-14 PROCEDURE — 85384 FIBRINOGEN ACTIVITY: CPT | Mod: 91 | Performed by: THORACIC SURGERY (CARDIOTHORACIC VASCULAR SURGERY)

## 2022-07-14 PROCEDURE — D9220A PRA ANESTHESIA: Mod: ,,, | Performed by: ANESTHESIOLOGY

## 2022-07-14 PROCEDURE — 33949 ECMO/ECLS DAILY MGMT ARTERY: CPT

## 2022-07-14 PROCEDURE — 85002 BLEEDING TIME TEST: CPT

## 2022-07-14 PROCEDURE — 63600175 PHARM REV CODE 636 W HCPCS: Performed by: THORACIC SURGERY (CARDIOTHORACIC VASCULAR SURGERY)

## 2022-07-14 PROCEDURE — 80053 COMPREHEN METABOLIC PANEL: CPT | Mod: 91 | Performed by: STUDENT IN AN ORGANIZED HEALTH CARE EDUCATION/TRAINING PROGRAM

## 2022-07-14 PROCEDURE — 36000709 HC OR TIME LEV III EA ADD 15 MIN: Performed by: THORACIC SURGERY (CARDIOTHORACIC VASCULAR SURGERY)

## 2022-07-14 PROCEDURE — 85027 COMPLETE CBC AUTOMATED: CPT | Performed by: STUDENT IN AN ORGANIZED HEALTH CARE EDUCATION/TRAINING PROGRAM

## 2022-07-14 PROCEDURE — 99291 CRITICAL CARE FIRST HOUR: CPT | Mod: ,,, | Performed by: ANESTHESIOLOGY

## 2022-07-14 PROCEDURE — 94003 VENT MGMT INPAT SUBQ DAY: CPT

## 2022-07-14 RX ORDER — FUROSEMIDE 10 MG/ML
10 INJECTION INTRAMUSCULAR; INTRAVENOUS ONCE
Status: COMPLETED | OUTPATIENT
Start: 2022-07-14 | End: 2022-07-14

## 2022-07-14 RX ORDER — ALBUTEROL SULFATE 2.5 MG/.5ML
10 SOLUTION RESPIRATORY (INHALATION) ONCE
Status: COMPLETED | OUTPATIENT
Start: 2022-07-14 | End: 2022-07-14

## 2022-07-14 RX ORDER — CEFEPIME HYDROCHLORIDE 1 G/50ML
2 INJECTION, SOLUTION INTRAVENOUS
Status: COMPLETED | OUTPATIENT
Start: 2022-07-14 | End: 2022-07-16

## 2022-07-14 RX ORDER — FUROSEMIDE 10 MG/ML
20 INJECTION INTRAMUSCULAR; INTRAVENOUS ONCE
Status: COMPLETED | OUTPATIENT
Start: 2022-07-14 | End: 2022-07-14

## 2022-07-14 RX ORDER — POTASSIUM CHLORIDE 29.8 MG/ML
40 INJECTION INTRAVENOUS ONCE
Status: COMPLETED | OUTPATIENT
Start: 2022-07-14 | End: 2022-07-14

## 2022-07-14 RX ORDER — PROTAMINE SULFATE 10 MG/ML
50 INJECTION, SOLUTION INTRAVENOUS ONCE
Status: COMPLETED | OUTPATIENT
Start: 2022-07-14 | End: 2022-07-14

## 2022-07-14 RX ORDER — HYDROCODONE BITARTRATE AND ACETAMINOPHEN 500; 5 MG/1; MG/1
TABLET ORAL
Status: DISCONTINUED | OUTPATIENT
Start: 2022-07-14 | End: 2022-07-16 | Stop reason: SDUPTHER

## 2022-07-14 RX ORDER — ROCURONIUM BROMIDE 10 MG/ML
INJECTION, SOLUTION INTRAVENOUS
Status: DISCONTINUED | OUTPATIENT
Start: 2022-07-14 | End: 2022-07-14

## 2022-07-14 RX ORDER — ALBUMIN HUMAN 50 G/1000ML
25 SOLUTION INTRAVENOUS ONCE
Status: DISCONTINUED | OUTPATIENT
Start: 2022-07-14 | End: 2022-07-14

## 2022-07-14 RX ORDER — CALCIUM GLUCONATE 20 MG/ML
1 INJECTION, SOLUTION INTRAVENOUS ONCE
Status: COMPLETED | OUTPATIENT
Start: 2022-07-15 | End: 2022-07-15

## 2022-07-14 RX ORDER — EPINEPHRINE 1 MG/ML
INJECTION INTRAMUSCULAR; INTRAVENOUS; SUBCUTANEOUS
Status: COMPLETED
Start: 2022-07-14 | End: 2022-07-14

## 2022-07-14 RX ORDER — POTASSIUM CHLORIDE 7.45 MG/ML
10 INJECTION INTRAVENOUS ONCE
Status: COMPLETED | OUTPATIENT
Start: 2022-07-14 | End: 2022-07-14

## 2022-07-14 RX ORDER — MAGNESIUM SULFATE HEPTAHYDRATE 40 MG/ML
2 INJECTION, SOLUTION INTRAVENOUS ONCE
Status: COMPLETED | OUTPATIENT
Start: 2022-07-14 | End: 2022-07-14

## 2022-07-14 RX ORDER — MIDAZOLAM HYDROCHLORIDE 1 MG/ML
INJECTION INTRAMUSCULAR; INTRAVENOUS
Status: DISCONTINUED | OUTPATIENT
Start: 2022-07-14 | End: 2022-07-14

## 2022-07-14 RX ORDER — CALCIUM GLUCONATE 20 MG/ML
1 INJECTION, SOLUTION INTRAVENOUS EVERY 10 MIN PRN
Status: DISCONTINUED | OUTPATIENT
Start: 2022-07-15 | End: 2022-07-16

## 2022-07-14 RX ORDER — NEOSTIGMINE METHYLSULFATE 0.5 MG/ML
INJECTION, SOLUTION INTRAVENOUS
Status: DISCONTINUED | OUTPATIENT
Start: 2022-07-14 | End: 2022-07-14

## 2022-07-14 RX ADMIN — POTASSIUM CHLORIDE 10 MEQ: 7.46 INJECTION, SOLUTION INTRAVENOUS at 01:07

## 2022-07-14 RX ADMIN — HYDROCORTISONE SODIUM SUCCINATE 100 MG: 100 INJECTION, POWDER, FOR SOLUTION INTRAMUSCULAR; INTRAVENOUS at 09:07

## 2022-07-14 RX ADMIN — PANTOPRAZOLE SODIUM 40 MG: 40 INJECTION, POWDER, FOR SOLUTION INTRAVENOUS at 08:07

## 2022-07-14 RX ADMIN — Medication 0.12 MCG/KG/MIN: at 06:07

## 2022-07-14 RX ADMIN — DEXTROSE 2 G: 50 INJECTION, SOLUTION INTRAVENOUS at 05:07

## 2022-07-14 RX ADMIN — INSULIN HUMAN 10 UNITS: 100 INJECTION, SOLUTION PARENTERAL at 11:07

## 2022-07-14 RX ADMIN — DOPAMINE HYDROCHLORIDE IN DEXTROSE 3 MCG/KG/MIN: 1.6 INJECTION, SOLUTION INTRAVENOUS at 03:07

## 2022-07-14 RX ADMIN — HYDROCORTISONE SODIUM SUCCINATE 100 MG: 100 INJECTION, POWDER, FOR SOLUTION INTRAMUSCULAR; INTRAVENOUS at 01:07

## 2022-07-14 RX ADMIN — DEXTROSE 500 MG: 50 INJECTION, SOLUTION INTRAVENOUS at 07:07

## 2022-07-14 RX ADMIN — Medication 0.1 MCG/KG/MIN: at 11:07

## 2022-07-14 RX ADMIN — PROPOFOL 30 MCG/KG/MIN: 10 INJECTION, EMULSION INTRAVENOUS at 05:07

## 2022-07-14 RX ADMIN — ONDANSETRON 2 G: 2 INJECTION INTRAMUSCULAR; INTRAVENOUS at 08:07

## 2022-07-14 RX ADMIN — Medication 0.1 MCG/KG/MIN: at 02:07

## 2022-07-14 RX ADMIN — ROCURONIUM BROMIDE 50 MG: 10 INJECTION, SOLUTION INTRAVENOUS at 09:07

## 2022-07-14 RX ADMIN — PROPOFOL 30 MCG/KG/MIN: 10 INJECTION, EMULSION INTRAVENOUS at 11:07

## 2022-07-14 RX ADMIN — MIDAZOLAM HYDROCHLORIDE 2 MG: 1 INJECTION, SOLUTION INTRAMUSCULAR; INTRAVENOUS at 09:07

## 2022-07-14 RX ADMIN — SODIUM CHLORIDE: 0.9 INJECTION, SOLUTION INTRAVENOUS at 04:07

## 2022-07-14 RX ADMIN — ANGIOTENSIN II 60 NG/KG/MIN: 2.5 INJECTION INTRAVENOUS at 09:07

## 2022-07-14 RX ADMIN — MAGNESIUM SULFATE HEPTAHYDRATE 2 G: 40 INJECTION, SOLUTION INTRAVENOUS at 03:07

## 2022-07-14 RX ADMIN — MUPIROCIN: 20 OINTMENT TOPICAL at 10:07

## 2022-07-14 RX ADMIN — DESMOPRESSIN ACETATE 31.35 MCG: 4 SOLUTION INTRAVENOUS at 02:07

## 2022-07-14 RX ADMIN — GLYCOPYRROLATE 0.8 MG: 0.2 INJECTION, SOLUTION INTRAMUSCULAR; INTRAVENOUS at 12:07

## 2022-07-14 RX ADMIN — ALBUTEROL SULFATE 10 MG: 2.5 SOLUTION RESPIRATORY (INHALATION) at 11:07

## 2022-07-14 RX ADMIN — HYDROCORTISONE SODIUM SUCCINATE 100 MG: 100 INJECTION, POWDER, FOR SOLUTION INTRAMUSCULAR; INTRAVENOUS at 12:07

## 2022-07-14 RX ADMIN — CHLOROTHIAZIDE SODIUM 250 MG: 500 INJECTION, POWDER, LYOPHILIZED, FOR SOLUTION INTRAVENOUS at 07:07

## 2022-07-14 RX ADMIN — DEXTROSE 250 ML: 10 SOLUTION INTRAVENOUS at 11:07

## 2022-07-14 RX ADMIN — VASOPRESSIN 0.04 UNITS/MIN: 20 INJECTION INTRAVENOUS at 03:07

## 2022-07-14 RX ADMIN — ANGIOTENSIN II 60 NG/KG/MIN: 2.5 INJECTION INTRAVENOUS at 02:07

## 2022-07-14 RX ADMIN — CEFEPIME 2 G: 2 INJECTION, POWDER, FOR SOLUTION INTRAVENOUS at 08:07

## 2022-07-14 RX ADMIN — SODIUM CHLORIDE, SODIUM GLUCONATE, SODIUM ACETATE, POTASSIUM CHLORIDE, MAGNESIUM CHLORIDE, SODIUM PHOSPHATE, DIBASIC, AND POTASSIUM PHOSPHATE: .53; .5; .37; .037; .03; .012; .00082 INJECTION, SOLUTION INTRAVENOUS at 10:07

## 2022-07-14 RX ADMIN — POTASSIUM CHLORIDE 20 MEQ: 14.9 INJECTION, SOLUTION INTRAVENOUS at 01:07

## 2022-07-14 RX ADMIN — PROPOFOL 30 MCG/KG/MIN: 10 INJECTION, EMULSION INTRAVENOUS at 02:07

## 2022-07-14 RX ADMIN — PROPOFOL 30 MCG/KG/MIN: 10 INJECTION, EMULSION INTRAVENOUS at 06:07

## 2022-07-14 RX ADMIN — NEOSTIGMINE METHYLSULFATE 5 MG: 0.5 INJECTION INTRAVENOUS at 12:07

## 2022-07-14 RX ADMIN — DOPAMINE HYDROCHLORIDE IN DEXTROSE 6 MCG/KG/MIN: 1.6 INJECTION, SOLUTION INTRAVENOUS at 02:07

## 2022-07-14 RX ADMIN — ROCURONIUM BROMIDE 50 MG: 10 INJECTION, SOLUTION INTRAVENOUS at 10:07

## 2022-07-14 RX ADMIN — HYDROCORTISONE SODIUM SUCCINATE 100 MG: 100 INJECTION, POWDER, FOR SOLUTION INTRAMUSCULAR; INTRAVENOUS at 06:07

## 2022-07-14 RX ADMIN — FUROSEMIDE 5 MG/HR: 10 INJECTION, SOLUTION INTRAMUSCULAR; INTRAVENOUS at 07:07

## 2022-07-14 RX ADMIN — ANGIOTENSIN II 55 NG/KG/MIN: 2.5 INJECTION INTRAVENOUS at 05:07

## 2022-07-14 RX ADMIN — HYDROMORPHONE HYDROCHLORIDE 1 MG: 1 INJECTION, SOLUTION INTRAMUSCULAR; INTRAVENOUS; SUBCUTANEOUS at 08:07

## 2022-07-14 RX ADMIN — MAGNESIUM SULFATE HEPTAHYDRATE 2 G: 40 INJECTION, SOLUTION INTRAVENOUS at 12:07

## 2022-07-14 RX ADMIN — PROTAMINE SULFATE 50 MG: 10 INJECTION, SOLUTION INTRAVENOUS at 01:07

## 2022-07-14 RX ADMIN — VASOPRESSIN 0.04 UNITS/MIN: 20 INJECTION INTRAVENOUS at 09:07

## 2022-07-14 RX ADMIN — FUROSEMIDE 20 MG: 10 INJECTION, SOLUTION INTRAMUSCULAR; INTRAVENOUS at 12:07

## 2022-07-14 RX ADMIN — INSULIN HUMAN 8.8 UNITS/HR: 1 INJECTION, SOLUTION INTRAVENOUS at 09:07

## 2022-07-14 RX ADMIN — MUPIROCIN: 20 OINTMENT TOPICAL at 08:07

## 2022-07-14 RX ADMIN — POTASSIUM CHLORIDE 40 MEQ: 29.8 INJECTION, SOLUTION INTRAVENOUS at 06:07

## 2022-07-14 RX ADMIN — NOREPINEPHRINE BITARTRATE 0.1 MCG/KG/MIN: 4 INJECTION, SOLUTION INTRAVENOUS at 04:07

## 2022-07-14 RX ADMIN — POTASSIUM CHLORIDE 20 MEQ: 14.9 INJECTION, SOLUTION INTRAVENOUS at 04:07

## 2022-07-14 RX ADMIN — VANCOMYCIN HYDROCHLORIDE 1000 MG: 1 INJECTION, POWDER, LYOPHILIZED, FOR SOLUTION INTRAVENOUS at 12:07

## 2022-07-14 RX ADMIN — POTASSIUM CHLORIDE 40 MEQ: 29.8 INJECTION, SOLUTION INTRAVENOUS at 08:07

## 2022-07-14 RX ADMIN — FUROSEMIDE 10 MG: 10 INJECTION, SOLUTION INTRAMUSCULAR; INTRAVENOUS at 08:07

## 2022-07-14 NOTE — CARE UPDATE
Patient admitted to SICU from OR via anesthesia team. Connected to ventilator and SICU monitor.   Dr. Donnie Avina at bedside, patient assessed and orders reviewed and followed through.  Patient w/ VA ECMO and LVAD in place on admit.   Current gtts infusing: Epi, Levo, Vaso, Loren, Dopamine, Propofol, TXA  Labs sent.     FFP drip initiated.   RBC infusing  Platelet transfusion initiated.     Patient arouses to voice with sedation vacation, follows simple commands and moves all extremities.   Spouse and family members to bedside, updated on plan of care.      Skin: Patient on immerse mattress. Heels free of skin breakdown. Unable to turn patient d/t chest open.

## 2022-07-14 NOTE — ANESTHESIA POSTPROCEDURE EVALUATION
Anesthesia Post Evaluation    Patient: Tim Richards    Procedure(s) Performed: Procedure(s) (LRB):  CLOSURE, WOUND, STERNUM (N/A)    Final Anesthesia Type: general      Patient location during evaluation: ICU  Patient participation: No - Unable to Participate, Intubation  Level of consciousness: sedated  Post-procedure vital signs: reviewed and stable  Pain management: adequate  Airway patency: patent  CHICHI mitigation strategies: Intraoperative administration of CPAP, nasopharyngeal airway, or oral appliance during sedation and Multimodal analgesia  PONV status at discharge: No PONV  Anesthetic complications: no      Cardiovascular status: hemodynamically stable  Respiratory status: ETT and ventilator  Hydration status: euvolemic  Follow-up not needed.          Vitals Value Taken Time   BP (72) 07/14/22 1331   Temp 36.6 °C (97.88 °F) 07/14/22 1331   Pulse 116 07/14/22 1331   Resp 15 07/14/22 1219   SpO2 95 % 07/14/22 1322   Vitals shown include unvalidated device data.      No case tracking events are documented in the log.      Pain/Iker Score: Pain Rating Prior to Med Admin: -- (see flacc) (7/14/2022  8:19 AM)

## 2022-07-14 NOTE — PROGRESS NOTES
[facility] - Surgical Intensive Care  Critical Care - Surgery  Progress Note    Patient Name: Tim Richards  MRN: [id number]  Admission Date: 6/27/2022  Hospital Length of Stay: 17 days  Code Status: Full Code  Attending Provider: Yg Kaufman MD  Primary Care Provider: Deyanira Booth MD   Principal Problem: Left ventricular assist device (LVAD) complication    Subjective:     Hospital/ICU Course:  No notes on file    Interval History/Significant Events: Stable pressor requirements overnight. High CT output early in the night which has slowed down. Making appropriate urine.     Follow-up For: Procedure(s) (LRB):  REPLACEMENT, PUMP (N/A)  REPAIR, AORTIC VALVE (N/A)  LYSIS, ADHESIONS    Post-Operative Day: 1 Day Post-Op    Objective:     Vital Signs (Most Recent):  Temp: 98.42 °F (36.9 °C) (07/14/22 0523)  Pulse: 99 (07/14/22 0523)  Resp: 14 (07/14/22 0100)  BP:  (Pt refused) (07/13/22 0815)  SpO2: 99 % (07/14/22 0400) Vital Signs (24h Range):  Temp:  [97.52 °F (36.4 °C)-98.6 °F (37 °C)] 98.42 °F (36.9 °C)  Pulse:  [] 99  Resp:  [14-45] 14  SpO2:  [60 %-100 %] 99 %  BP: (84)/(0) 84/0  Arterial Line BP: (65-71)/(47-61) 65/55     Weight: 126.1 kg (278 lb)  Body mass index is 36.69 kg/m².      Intake/Output Summary (Last 24 hours) at 7/14/2022 0612  Last data filed at 7/14/2022 0500  Gross per 24 hour   Intake 15280.5 ml   Output 4280 ml   Net 68260.5 ml       Physical Exam  Constitutional:       Comments: Intubated and sedated   HENT:      Head: Normocephalic and atraumatic.      Mouth/Throat:      Comments: OG in place  Eyes:      Pupils: Pupils are equal, round, and reactive to light.   Neck:      Comments: L IJ CVC  R IJ trialysis  Cardiovascular:      Rate and Rhythm: Regular rhythm. Tachycardia present.      Comments: On V-A ECMO -- 3700 rpm, 3.0L  LVAD in place -- 6000 rpm, 5.1L    Chest is open. Covered with ioban    2 plerual and 2 mediastinal chest tubes to suction. Clots beginning to form in the  tubes.    V pacing wires in place.   Pulmonary:      Comments: Mechanically ventilated  Abdominal:      General: There is no distension.      Palpations: Abdomen is soft.   Genitourinary:     Comments: Lagunas in place draining clear urine  Musculoskeletal:      Comments: ECMO cannula x2 (right femoral artery, right femoral vein)    L brachial, right radial arterial line   Skin:     General: Skin is warm and dry.   Neurological:      Comments: Following commands when sedation paused       Vents:  Vent Mode: A/C (07/14/22 0523)  Ventilator Initiated: Yes (07/13/22 2148)  Set Rate: 14 BPM (07/14/22 0523)  Vt Set: 400 mL (07/14/22 0523)  PEEP/CPAP: 8 cmH20 (07/14/22 0523)  Oxygen Concentration (%): 100 (07/14/22 0523)  Peak Airway Pressure: 29 cmH2O (07/14/22 0523)  Plateau Pressure: 21 cmH20 (07/14/22 0523)  Total Ve: 5.59 mL (07/14/22 0523)  Negative Inspiratory Force (cm H2O): 0 (07/14/22 0523)  F/VT Ratio<105 (RSBI): (!) 40.43 (07/13/22 2323)    Lines/Drains/Airways       Central Venous Catheter Line  Duration                  ECMO Cannula 07/13/22 right femoral vein;right femoral artery 1 day    Introducer 07/13/22 0900 left internal jugular <1 day    Percutaneous Central Line Insertion/Assessment - Quad Lumen  07/13/22 0941 left internal jugular <1 day    Pulmonary Artery Catheter Assessment  07/13/22 0900 left internal jugular <1 day    Trialysis (Dialysis) Catheter 07/13/22 2100 right internal jugular <1 day              Drain  Duration                  Chest Tube 07/13/22 1916 1 Right Pleural 32 Fr. <1 day         Chest Tube 07/13/22 1916 2 Left Pleural 32 Fr. <1 day         Chest Tube 07/13/22 1916 3 Anterior Mediastinal 32 Fr. <1 day         Chest Tube 07/13/22 1916 4 Posterior Mediastinal 32 Fr. <1 day         NG/OG Tube 07/13/22 2100 orogastric 16 Fr. Right mouth <1 day         Urethral Catheter 07/13/22 0848 Temperature probe;Latex 14 Fr. <1 day              Airway  Duration                  Airway -  Non-Surgical 07/13/22 0848 <1 day              Arterial Line  Duration             Arterial Line 07/13/22 0932 Left Brachial <1 day    Arterial Line 07/13/22 2000 Right Radial <1 day              Line  Duration                  VAD 07/13/22 1300 Left ventricular assist device HeartMate 3 <1 day              Peripheral Intravenous Line  Duration                  Midline Catheter Insertion/Assessment  - Single Lumen 06/28/22 1027 Right cephalic vein (lateral side of arm) 20g x 8cm 15 days                    Significant Labs:    CBC/Anemia Profile:  Recent Labs   Lab 07/13/22 2149 07/13/22  2314 07/14/22 0102 07/14/22  0103 07/14/22  0405 07/14/22  0408 07/14/22  0515 07/14/22  0557   WBC 12.62  --  11.83  --  12.59  --   --   --    HGB 8.8*  --  9.4*  --  9.4*  --   --   --    HCT 26.5*   < > 28.8*   < > 28.4* 26* 25* 22*   PLT 52*  --  133*  --  126*  --   --   --    MCV 89  --  87  --  89  --   --   --    RDW 14.1  --  14.5  --  14.6*  --   --   --     < > = values in this interval not displayed.        Chemistries:  Recent Labs   Lab 07/13/22 2149 07/14/22 0102 07/14/22  0405   * 149* 147*   K 3.2*  3.2*  3.2* 3.6 3.7    109 108   CO2 18* 20* 23   BUN 17 19 20   CREATININE 2.1* 2.2* 2.4*   CALCIUM 10.0 9.1 8.9   ALBUMIN 2.3* 2.4* 2.4*   PROT 3.7* 3.6* 3.8*   BILITOT 2.6* 2.5* 2.9*   ALKPHOS 39* 39* 42*   ALT 63* 79* 109*   * 153* 221*   MG 2.6  2.6 2.3 2.4   PHOS 2.4*  2.4* 2.7 3.0       ABGs:   Recent Labs   Lab 07/14/22  0557   PH 7.332*   PCO2 47.8*   HCO3 25.3   POCSATURATED 99   BE -1     Coagulation:   Recent Labs   Lab 07/14/22  0405   INR 1.3*   APTT 33.9*     Lactic Acid:   Recent Labs   Lab 07/13/22  2149   LACTATE >12.0*     All pertinent labs within the past 24 hours have been reviewed.    Significant Imaging:  I have reviewed all pertinent imaging results/findings within the past 24 hours.    Assessment/Plan:     Chronic combined systolic and diastolic heart failure  Tim  Yonis Richards is a 56 y.o. male HFrEF with an EF of 10% and a history of HM2 LVAD in 2018 who is now s/p HM3 upgrade, initiation of V-A ECMO after failure to wean off bypass x2      Neuro/Psych:   -- Sedation: Propofol.  -- Pain: PRN Dilaudid             Cards:   -- S/P LVAD exchange on 7/14/22  -- Requiring high dose pressors:   Epi 0.12   Levo 0.1   Vaso 0.04   Dopamine 6   Giapreza 60  -- Stress dose steroids  -- Ventricular pacing wires. Paced at 80 BPM  -- MAP goal >65  -- VAD and ECMO flows stable  -- Plan to return to the OR today for washout**      Pulm:   -- Goal O2 sat > 90%  -- ABG PRN  -- Mediastinal chest tubes x2 and pleural chest tube x2 to suction  -- Nitric at 20 ppm      Renal:  -- Keep sun for strict I/O  -- Trend BUN/Cr 20/2.4  -- Adequate UOP after 20  of lasix last night  -- Starting a lasix drip      FEN / GI:   -- Replace lytes as needed  -- Nutrition: NPO  -- GI ppx: pantoprazole      ID:   -- Tm: afebrile; WBC stable  -- Ancef and vanc      Heme/Onc:   -- H/H stable   -- Daily CBC  -- Received 18 pRBCs, 16 FFP, 2 plt, 25 cryo; 1500 albumin intra-op  -- FFP drip  -- Coags beginning to normalize      Endo:   -- BG goal 140-180  -- Insulin gtt      PPx:   Feeding: NPO  Analgesia/Sedation: Propofol / PRN Dilaudid  Thromboembolic prevention: SCDs  HOB >30: Yes  Stress Ulcer ppx: PPI  Glucose control: Critical care goal 140-180 g/dl, ISS     Lines/Drains/Airway: ETT; OG; ECMO canula x2; RIJ trialysis, LIJ CVC and gertrude, r-radial a-line, Chest tube x4; sun; WV, VAD; midline      Dispo/Code Status/Palliative:   -- SICU / Full Code.   -- Plan to return to the OR today           Dang Amezcua MD  Critical Care - Surgery  [facility] - [facility]

## 2022-07-14 NOTE — PROGRESS NOTES
Pt seen in OR #10. Pt has a BS S-ICD. Tachy therapy disabled. Call device clinic at [phone] after surgery for device reprogramming.

## 2022-07-14 NOTE — ADDENDUM NOTE
Addendum  created 07/14/22 1544 by Christine aCrtwright MD    Child order released for a procedure order, Clinical Note Signed, Intraprocedure Blocks edited, SmartForm saved

## 2022-07-14 NOTE — PROGRESS NOTES
Pharmacokinetic Initial Assessment: IV Vancomycin    Assessment/Plan:    Initiate intravenous vancomycin with loading dose of 1250 mg once followed by a maintenance dose of vancomycin 1000mg IV every 12 hours  Desired empiric serum trough concentration is 10 to 20 mcg/mL  Draw vancomycin trough level 60 min prior to fourth dose on 7/14 at approximately 2100  Pharmacy will continue to follow and monitor vancomycin.      Please contact pharmacy at extension [phone] with any questions regarding this assessment.     Thank you for the consult,   Peggy Sharif       Patient brief summary:  Tim Richards is a 56 y.o. male initiated on antimicrobial therapy with IV Vancomycin for treatment of suspected surgical prophylaxis    Drug Allergies:   Review of patient's allergies indicates:   Allergen Reactions    Lisinopril Anaphylaxis    Hydralazine analogues      Chronic constipation, impotence, dizziness       Actual Body Weight:   104.5kg    Renal Function:   Estimated Creatinine Clearance: 70.6 mL/min (A) (based on SCr of 1.5 mg/dL (H)).,     Dialysis Method (if applicable):  N/A    CBC (last 72 hours):  Recent Labs   Lab Result Units 07/11/22  0252 07/12/22  0743   WBC K/uL 5.95 5.02   Hemoglobin g/dL 8.3* 8.4*   Hematocrit % 27.3* 27.7*   Platelets K/uL 200 210   Gran % % 68.7 68.4   Lymph % % 12.8* 14.1*   Mono % % 16.5* 16.3*   Eosinophil % % 1.5 0.6   Basophil % % 0.2 0.2   Differential Method  Automated Automated       Metabolic Panel (last 72 hours):  Recent Labs   Lab Result Units 07/11/22  0252 07/11/22  1816 07/12/22  0325 07/12/22  1904 07/13/22  0514   Sodium mmol/L 136 136 134* 132* 130*   Potassium mmol/L 3.8 4.1 4.0 4.0 3.7   Chloride mmol/L 102 101 101 101 101   CO2 mmol/L 25 25 23 26 22*   Glucose mg/dL 83 91 80 93 78   BUN mg/dL 21* 19 18 18 17   Creatinine mg/dL 1.5* 1.8* 1.5* 1.7* 1.5*   Magnesium mg/dL 1.9 1.9 2.2 2.2 2.1       Drug levels (last 3 results):  No results for input(s): VANCOMYCINRA,  VANCORANDOM, VANCOMYCINPE, VANCOPEAK, VANCOMYCINTR, VANCOTROUGH in the last 72 hours.    Microbiologic Results:  Microbiology Results (last 7 days)     ** No results found for the last 168 hours. **

## 2022-07-14 NOTE — SUBJECTIVE & OBJECTIVE
Interval History/Significant Events: Stable pressor requirements overnight. High CT output early in the night which has slowed down. Making appropriate urine.     Follow-up For: Procedure(s) (LRB):  REPLACEMENT, PUMP (N/A)  REPAIR, AORTIC VALVE (N/A)  LYSIS, ADHESIONS    Post-Operative Day: 1 Day Post-Op    Objective:     Vital Signs (Most Recent):  Temp: 98.42 °F (36.9 °C) (07/14/22 0523)  Pulse: 99 (07/14/22 0523)  Resp: 14 (07/14/22 0100)  BP:  (Pt refused) (07/13/22 0815)  SpO2: 99 % (07/14/22 0400) Vital Signs (24h Range):  Temp:  [97.52 °F (36.4 °C)-98.6 °F (37 °C)] 98.42 °F (36.9 °C)  Pulse:  [] 99  Resp:  [14-45] 14  SpO2:  [60 %-100 %] 99 %  BP: (84)/(0) 84/0  Arterial Line BP: (65-71)/(47-61) 65/55     Weight: 126.1 kg (278 lb)  Body mass index is 36.69 kg/m².      Intake/Output Summary (Last 24 hours) at 7/14/2022 0612  Last data filed at 7/14/2022 0500  Gross per 24 hour   Intake 71263.5 ml   Output 4280 ml   Net 08367.5 ml       Physical Exam  Constitutional:       Comments: Intubated and sedated   HENT:      Head: Normocephalic and atraumatic.      Mouth/Throat:      Comments: OG in place  Eyes:      Pupils: Pupils are equal, round, and reactive to light.   Neck:      Comments: L IJ CVC  R IJ trialysis  Cardiovascular:      Rate and Rhythm: Regular rhythm. Tachycardia present.      Comments: On V-A ECMO -- 3700 rpm, 3.0L  LVAD in place -- 6000 rpm, 5.1L    Chest is open. Covered with ioban    2 plerual and 2 mediastinal chest tubes to suction. Clots beginning to form in the tubes.    V pacing wires in place.   Pulmonary:      Comments: Mechanically ventilated  Abdominal:      General: There is no distension.      Palpations: Abdomen is soft.   Genitourinary:     Comments: Lagunas in place draining clear urine  Musculoskeletal:      Comments: ECMO cannula x2 (right femoral artery, right femoral vein)    L brachial, right radial arterial line   Skin:     General: Skin is warm and dry.   Neurological:       Comments: Following commands when sedation paused       Vents:  Vent Mode: A/C (07/14/22 0523)  Ventilator Initiated: Yes (07/13/22 2148)  Set Rate: 14 BPM (07/14/22 0523)  Vt Set: 400 mL (07/14/22 0523)  PEEP/CPAP: 8 cmH20 (07/14/22 0523)  Oxygen Concentration (%): 100 (07/14/22 0523)  Peak Airway Pressure: 29 cmH2O (07/14/22 0523)  Plateau Pressure: 21 cmH20 (07/14/22 0523)  Total Ve: 5.59 mL (07/14/22 0523)  Negative Inspiratory Force (cm H2O): 0 (07/14/22 0523)  F/VT Ratio<105 (RSBI): (!) 40.43 (07/13/22 2323)    Lines/Drains/Airways       Central Venous Catheter Line  Duration                  ECMO Cannula 07/13/22 right femoral vein;right femoral artery 1 day    Introducer 07/13/22 0900 left internal jugular <1 day    Percutaneous Central Line Insertion/Assessment - Quad Lumen  07/13/22 0941 left internal jugular <1 day    Pulmonary Artery Catheter Assessment  07/13/22 0900 left internal jugular <1 day    Trialysis (Dialysis) Catheter 07/13/22 2100 right internal jugular <1 day              Drain  Duration                  Chest Tube 07/13/22 1916 1 Right Pleural 32 Fr. <1 day         Chest Tube 07/13/22 1916 2 Left Pleural 32 Fr. <1 day         Chest Tube 07/13/22 1916 3 Anterior Mediastinal 32 Fr. <1 day         Chest Tube 07/13/22 1916 4 Posterior Mediastinal 32 Fr. <1 day         NG/OG Tube 07/13/22 2100 orogastric 16 Fr. Right mouth <1 day         Urethral Catheter 07/13/22 0848 Temperature probe;Latex 14 Fr. <1 day              Airway  Duration                  Airway - Non-Surgical 07/13/22 0848 <1 day              Arterial Line  Duration             Arterial Line 07/13/22 0932 Left Brachial <1 day    Arterial Line 07/13/22 2000 Right Radial <1 day              Line  Duration                  VAD 07/13/22 1300 Left ventricular assist device HeartMate 3 <1 day              Peripheral Intravenous Line  Duration                  Midline Catheter Insertion/Assessment  - Single Lumen 06/28/22 1027 Right  cephalic vein (lateral side of arm) 20g x 8cm 15 days                    Significant Labs:    CBC/Anemia Profile:  Recent Labs   Lab 07/13/22 2149 07/13/22  2314 07/14/22 0102 07/14/22  0103 07/14/22  0405 07/14/22  0408 07/14/22  0515 07/14/22  0557   WBC 12.62  --  11.83  --  12.59  --   --   --    HGB 8.8*  --  9.4*  --  9.4*  --   --   --    HCT 26.5*   < > 28.8*   < > 28.4* 26* 25* 22*   PLT 52*  --  133*  --  126*  --   --   --    MCV 89  --  87  --  89  --   --   --    RDW 14.1  --  14.5  --  14.6*  --   --   --     < > = values in this interval not displayed.        Chemistries:  Recent Labs   Lab 07/13/22 2149 07/14/22 0102 07/14/22 0405   * 149* 147*   K 3.2*  3.2*  3.2* 3.6 3.7    109 108   CO2 18* 20* 23   BUN 17 19 20   CREATININE 2.1* 2.2* 2.4*   CALCIUM 10.0 9.1 8.9   ALBUMIN 2.3* 2.4* 2.4*   PROT 3.7* 3.6* 3.8*   BILITOT 2.6* 2.5* 2.9*   ALKPHOS 39* 39* 42*   ALT 63* 79* 109*   * 153* 221*   MG 2.6  2.6 2.3 2.4   PHOS 2.4*  2.4* 2.7 3.0       ABGs:   Recent Labs   Lab 07/14/22  0557   PH 7.332*   PCO2 47.8*   HCO3 25.3   POCSATURATED 99   BE -1     Coagulation:   Recent Labs   Lab 07/14/22 0405   INR 1.3*   APTT 33.9*     Lactic Acid:   Recent Labs   Lab 07/13/22 2149   LACTATE >12.0*     All pertinent labs within the past 24 hours have been reviewed.    Significant Imaging:  I have reviewed all pertinent imaging results/findings within the past 24 hours.

## 2022-07-14 NOTE — CARE UPDATE
Mr. Richards returns to the SICU from the OR following mediastinal exploration and washout. The case was uncomplicated and there was no active bleeding identified. In the OR he received 300 cc crystalloid. Urine output during the case recorded as 450 mL. No blood products were administered during the case. Post-op bronch demonstrated mucus on the right and small amount of blood on the left. Mr. Richards arrives intubated and under mechanical ventilation. Medications on admission include Epi @ 0.12, Levo @ 0.06, Vaso @ 0.04, Loren @ 60, Dopa @ 4, nitric nitric at 20, and an insulin gtt.     Plan  -Wean vasopressors as tolerated, focus on Loren and levo first  -Continue lasix gtt to optimize fluid status

## 2022-07-14 NOTE — ANESTHESIA PROCEDURE NOTES
Central Line    Diagnosis: Complication involving left ventricular assist device (LVAD), subsequent encounter [T82.9XXD]  Patient location during procedure: done in OR    Staffing  Authorizing Provider: Andrae Barrett Jr., MD  Performing Provider: Christine Cartwright MD    Staffing  Performed: resident/CRNA   Resident/CRNA: Christine Cartwright MD  Anesthesiologist was present at the time of the procedure.  Preanesthetic Checklist  Completed: patient identified, IV checked, site marked, risks and benefits discussed, surgical consent, monitors and equipment checked, pre-op evaluation, timeout performed and anesthesia consent given  Indication   Indication: hemodynamic monitoring, vascular access, med administration, hemodialysis     Anesthesia   general anesthesia    Central Line   Skin Prep: skin prepped with ChloraPrep, skin prep agent completely dried prior to procedure  Sterile Barriers Followed: Yes    All five maximal barriers used- gloves, gown, cap, mask, and large sterile sheet    hand hygiene performed prior to central venous catheter insertion  Location: right internal jugular.   Catheter type: dialysis  Catheter Size: 12 Fr  Ultrasound: vascular probe with ultrasound   Vessel Caliber: medium, patent, compressibility normal  Needle advanced into vessel with real time Ultrasound guidance.  Guidewire confirmed in vessel.  Image recorded and saved.  sterile gel and probe cover used in ultrasound-guided central venous catheter insertion   Manometry: Venous cannualation confirmed by visual estimation of blood vessel pressure using manometry.  Insertion Attempts: 1   Securement:line sutured, chlorhexidine patch, sterile dressing applied and blood return through all ports    Post-Procedure    Adverse Events:none      Guidewire Guidewire removed intact.

## 2022-07-14 NOTE — ANESTHESIA POSTPROCEDURE EVALUATION
Anesthesia Post Evaluation    Patient: Tim Richards    Procedure(s) Performed: Procedure(s) (LRB):  REPLACEMENT, PUMP (N/A)  REPAIR, AORTIC VALVE (N/A)  LYSIS, ADHESIONS  REPLACEMENT, LVAD    Final Anesthesia Type: general      Patient location during evaluation: ICU  Patient participation: No - Unable to Participate, Intubation  Level of consciousness: sedated  Post-procedure vital signs: reviewed and stable  Pain management: adequate  Airway patency: patent    PONV status at discharge: No PONV  Anesthetic complications: no      Cardiovascular status: decreasing pressor requirements but still on high doses.  Respiratory status: ETT, intubated and ventilator  Hydration status: euvolemic  Follow-up not needed.          Vitals Value Taken Time   BP 73/66 07/14/22 0713   Temp 36.7 °C (98.06 °F) 07/14/22 0750   Pulse 102 07/14/22 0750   Resp 12 07/14/22 0718   SpO2 98 % 07/14/22 0718   Vitals shown include unvalidated device data.      No case tracking events are documented in the log.      Pain/Iker Score: No data recorded

## 2022-07-14 NOTE — PROGRESS NOTES
Report received from Anesthesia team. SICU RN aware of patient current condition, all gtts, VS, ECMO settings, LVAD numbers, output, and products/medications given in OR. Patient transported from OR to SICU 1008. Upon arrival SICU RN at bedside. Dr. Avina and CTS resident at bedside. Bedside report completed. Patient connected to SICU monitors, SICU ventilator and LVAD monitor. Stat labs sent,  ABG/lytes/lactic done, CXR completed, AICD turned back on. Dr. Kaufman presented to bedside and notified SICU RN of plan of care. See flowsheet for complete documentation.

## 2022-07-14 NOTE — RESPIRATORY THERAPY
Pt. Arrived in SICU intubated with size 7.5ETT secured 22cm at lips. Currently on AC ventilatory supportnwith Nitric via vent at 20ppm.

## 2022-07-14 NOTE — PROGRESS NOTES
..ECMO Specialists shift report    Date: 07/14/2022  ECMO Specialist:  Pippa Horan    Pump parameters:  RPM: 3700  Flow:  3.05-3.15  Sweep:  4  FiO2:  80%    Oxygenator status:  Clots: none  Fibrin: none    Pressure trends:  P1: 234-236  P2: 216-218  Delta P: 17-18  Negative:      Volume status:  Chugging noted none  CVP: 11  MAP: 60's  MD notified (name)    Anticoagulation:  APTT: 40-80  aPTTtrends this shift:     Cannula size / status / placement:  Return cannula / from circuit to patient: 18Fr [employer] RFA  Drainage cannula / from patient to circuit: 27Fr [employer] RFV       Additional Comments:  Pt has 4 midline chest tubes.  Pt has been given multiple continuous rounds of products throughout night. Dr. Kaufman wants flows to remain at 3.0-3.1, as long as POa2 >100.  Pt also has an LVAD in place and working.  Plan to wash out chest, with possible closure today.

## 2022-07-14 NOTE — PROGRESS NOTES
07/14/2022  Perfusionist:  Mejia Shane Jr  [facility],   Surgeon(s) and Role:     * Yg Kaufman MD - Primary     * Angelo Avina MD - Fellow  Anesthesiologist:  Kole Woodruff MD  Primary Perfusionist:: Casper Harris [facility], Fitz Christianson [facility], Shirley Porras CP, Estephania Martinez [facility]    Past Medical History:   Diagnosis Date    Anticoagulant long-term use     CHF (congestive heart failure)     Class 1 obesity due to excess calories with serious comorbidity and body mass index (BMI) of 31.0 to 31.9 in adult     Dilated cardiomyopathy 1/10/2018    Disorder of kidney and ureter     CKD    Encounter for blood transfusion     Gout     HTN (hypertension)     Hx of psychiatric care     ICD (implantable cardioverter-defibrillator) infection 7/1/2020    Psychiatric problem     Thyroid disease     Ventricular tachycardia (paroxysmal)        Device implanted: [employer] Heartmate3  Console SN:  Perfusion/Heartmate Touch      For implantable VADs  Pump assembled and wet-tested by: Mejia Shane Jr.  [facility],   Pump SN: [id number]  Primary controller: [id number]  Backup controller: [id number]  For Heartmate3 devices: The VAD was started in extended alarm silence at 3000 RPMs per  recommendation.  Initially at 3000 RPMs, the PI was 4.2 and the power was 2.2W while flow did not register.  Note the VAD was started while the patient was still on full cardiopulmonary bypass.  As the bypass machine was weaned by the primary perfusionist, the VAD speed was changed multiple times by Mejia Shane Jr under the direction of Surgeon(s) and Role:     * Yg Kaufman MD - Primary     * Angelo Avina MD - Fellow and Kole Woodruff MD utilizing BRITTANY guidance to assess LV and/or RV function, as well as ventricular filling/emptying.  Those changes occurred rapidly over the first few minutes of weaning from bypass and conversion to full VAD support, and due to the extremely fluid nature of this process,  those rapid changes are not documented here.  The initial VAD parameters once stable are listed in the table below:    Initial VAD parameters at OR exit:  Fixed speed (RPMs) Low speed (RPMs) Flow (LPMs) PI Power (w) Extended alarm silence cancelled?   6400 6000 5.3 1.7 5.3 [x]       After weaning from cardiopulmonary bypass and transition to LVAD support, the patient experienced a profound episode of vasoplegia.  Despite maximal inotrope and pressor support,  the patient was unstable and therefore was returned to cardiopulmonary bypass and then cannulated for VA ECMO and taken to the SICU in tenuous but stable condition.    At the end of the procedure, the current device settings were verified to be accurate.  The patient was transported post operatively with back-up/emergency equipment that was delivered to the patient's room and verified by the bedside nurse, and report was given.  I was available by phone for management and troubleshooting concerns by the unit staff.    10:54 AM

## 2022-07-14 NOTE — PLAN OF CARE
Patient taken to OR today for washout, chest left open. Total of 3 PRBC, 2 platelets, 2 cryo given. Lasix 10 mg IV push x2 given and lasix infusion started today. Epinephrine weaned to 0.1 mcg/kg/min. Levophed weaned to 0.04 mcg/kg/min. Vasopressin at 0.04 units/min. Dopamine weaned to 3 mcg/kg/min. Plan is to wean Giapreza by 5 ng/kg/min every 4 hours if MAP >75. MAP goal 70-85. Patient did have 17 beat run of VTACH, electolytes checked and Mg replaced. LVAD speed lowered today to 5600, LSL 5200, Flows 4.7-5, PI 1.4-1.9, Power 4-4.4. ECMO speed 3800, Flows 3, FiO2 100% and Sweep 4. Good U/O. Minimal chest tube output post-washout. Plan is for possible take back to OR tomorrow for chest closure.

## 2022-07-14 NOTE — ANESTHESIA PROCEDURE NOTES
BRITTANY    Diagnosis: Chronic combined systolic and diastolic heart failure [I50.42]  Patient location during procedure: OR  Exam type: Baseline    Staffing  Performed: fellow and anesthesiologist     Anesthesiologist: Kole Woodruff MD        Anesthesiologist Present  Yes      Setup & Induction  Patient preparation: bite block inserted  Probe Insertion: easy  Exam: completeDoppler Echo: 2D, color flow mapping, pulse wave Doppler and continuous wave Doppler.  Exam     Left Heart  Left Atruim: dilated    Left Ventricle: cm, severely abnormal (men >/= 6.8; women>/= 6.1)    LVAD:yes  Inflow Cannula and Direction: midline  Septum:bowing  Estimated Ejection Fraction: < 25% severe            Right Heart  Right Ventricle: dilated  Right Ventricle Function: moderately decreased  Right Atria:  moderately abnormal    Intra Atrial Septum  PFO: no shunt by color flow doppler  no IAS aneurysm  no lipomatous hypertrophy  no Atrial Septal Defect (Asd)    Right Ventricle  Size: dilated    Aortic Valve:  Morphology: trileaflet    Regurgitation: mild (2+) aortic regurgitation      Mitral Valve:   Morphology:normal  Jet Description: mild-to-moderate and centrally-directed        Pulmonic Valve:  Morphology:normal          Effusions left pleural and right pleural    SummaryFindings discussed with surgeon.    Other Findings   Baseline    - LV is markedly dilated with EF 10%. HM 3 inflow cannula is seen at LV apex, aimed midline.  - RV function is moderately decreased  - Atria are dilated. The IAS is midline. ECMO venous cannula seen at cavoatrial junction  - AV is tricuspid with leaflets that open normally. There trace AI present  - There is mild to moderate MR  - Mild TR present. PV is normal appearing  - Bilateral pleural effusions seen. Small to moderate sized    Post washout:  - Mild improvement in RV systolic function after removal of compressive dressing and laps.   - Pleural effusions evacuated  - Remainder of exam unchanged  from baseline

## 2022-07-14 NOTE — PLAN OF CARE
[facility] - [facility] Intensive Care  Initial Discharge Assessment       BEING FOLLOWED BY TRANSPLANT UR/CM/MARIA DEL CARMEN

## 2022-07-14 NOTE — H&P
[facility]  History & Physical  Critical Care-Surgery      SUBJECTIVE:     Chief Complaint/Reason for Admission: HFrEF    History of Present Illness:  Patient is a 56 y.o. male with Hx of HFrEF with an EF of 10%, he had an HM2 placed in 2018 with Dr. Kaufman. He was initially admitted with acute heart failure. During this admission there was increasing concern for LVAD dysfunction. He went to the OR today for HM3 upgrade, had intra-operative VA ECMO started after he failed to wean off bypass. His chest is open with wound vac in place; chest tubes x4 with 1600 of total output. Transported to ICU in critical condition on dobutamine, vaso, epi, dopamine and giapreza. Methylene blue administered during case. He has made 1L of urine. Pt received 18 pRBCs, 16 FFP, 2 plt, 25 cryo; 1500 albumin intra-op.      PTA Medications   Medication Sig    allopurinoL (ZYLOPRIM) 100 MG tablet TAKE 1 TABLET (100 MG) BY MOUTH NIGHTLY.    ALPRAZolam (XANAX) 1 MG tablet Take 1 tablet (1 mg total) by mouth daily as needed for Anxiety. Bid prn    amiodarone (PACERONE) 200 MG Tab Take 2 tablets (400 mg total) by mouth once daily.    amLODIPine (NORVASC) 10 MG tablet TAKE 1 TABLET BY MOUTH EVERY DAY    aspirin (ECOTRIN) 81 MG EC tablet Take 1 tablet (81 mg total) by mouth once daily.    busPIRone (BUSPAR) 5 MG Tab Take 1 tablet (5 mg total) by mouth 3 (three) times daily.    levothyroxine (SYNTHROID) 112 MCG tablet Take 1 tablet (112 mcg total) by mouth before breakfast.    mexiletine (MEXITIL) 250 MG Cap Take 1 capsule (250 mg total) by mouth every 8 (eight) hours.    pantoprazole (PROTONIX) 40 MG tablet TAKE 1 TABLET (40 MG TOTAL) BY MOUTH DAILY WITH LUNCH.    warfarin (COUMADIN) 5 MG tablet TAKE 7.5 MG ORALLY DAILY EVERY SUNDAY AND THURSDAY, TAKE 5 MG ORALLY DAILY ON OTHER DAYS    zolpidem (AMBIEN CR) 12.5 MG CR tablet Take 1 tablet (12.5 mg total) by mouth nightly as needed for Insomnia.       Review of patient's  allergies indicates:   Allergen Reactions    Lisinopril Anaphylaxis    Hydralazine analogues      Chronic constipation, impotence, dizziness       Past Medical History:   Diagnosis Date    Anticoagulant long-term use     CHF (congestive heart failure)     Class 1 obesity due to excess calories with serious comorbidity and body mass index (BMI) of 31.0 to 31.9 in adult     Dilated cardiomyopathy 1/10/2018    Disorder of kidney and ureter     CKD    Encounter for blood transfusion     Gout     HTN (hypertension)     Hx of psychiatric care     ICD (implantable cardioverter-defibrillator) infection 7/1/2020    Psychiatric problem     Thyroid disease     Ventricular tachycardia (paroxysmal)      Past Surgical History:   Procedure Laterality Date    CARDIAC CATHETERIZATION  Dec. 2012    CARDIAC DEFIBRILLATOR PLACEMENT Left     CRRT-D    COLONOSCOPY N/A 3/6/2018    Procedure: COLONOSCOPY;  Surgeon: Alonso Bone MD;  [facility]  Service: Endoscopy;  Laterality: N/A;    COLONOSCOPY N/A 7/17/2019    Procedure: COLONOSCOPY;  Surgeon: Blane Valdez MD;  Location: [facility] ENDO (2ND FLR);  Service: Endoscopy;  Laterality: N/A;    COLONOSCOPY N/A 7/18/2019    Procedure: COLONOSCOPY;  Surgeon: Blane Valdez MD;  Location: [facility] (2ND FLR);  Service: Endoscopy;  Laterality: N/A;    ESOPHAGOGASTRODUODENOSCOPY N/A 7/17/2019    Procedure: EGD (ESOPHAGOGASTRODUODENOSCOPY);  Surgeon: Blane Valdez MD;  Location: [facility] (2ND FLR);  Service: Endoscopy;  Laterality: N/A;    ESOPHAGOGASTRODUODENOSCOPY N/A 7/18/2019    Procedure: EGD (ESOPHAGOGASTRODUODENOSCOPY);  Surgeon: Blane Valdez MD;  Location: [facility] SAMM (2ND FLR);  Service: Endoscopy;  Laterality: N/A;    NONINVASIVE CARDIAC ELECTROPHYSIOLOGY STUDY N/A 10/18/2019    Procedure: CARDIAC ELECTROPHYSIOLOGY STUDY, NONINVASIVE;  Surgeon: Raz Wagner MD;  Location: [facility] EP LAB;  Service: Cardiology;  Laterality: N/A;  VT, DFTs, MDT CRTD in situ,  LVAD, LASHELL pizarro, 3098    REPLACEMENT OF IMPLANTABLE CARDIOVERTER-DEFIBRILLATOR (ICD) GENERATOR N/A 3/9/2020    Procedure: REPLACEMENT, ICD GENERATOR;  Surgeon: Harry Yun MD;  Location: [facility] EP LAB;  Service: Cardiology;  Laterality: N/A;  VT, ICD Gen Change and Lead Revision, MDT, MAC, DM,3 Prep    REVISION OF IMPLANTABLE CARDIOVERTER-DEFIBRILLATOR (ICD) ELECTRODE LEAD PLACEMENT N/A 3/9/2020    Procedure: REVISION, INSERTION, ELECTRODE LEAD, ICD;  Surgeon: Harry Yun MD;  Location: [facility] EP LAB;  Service: Cardiology;  Laterality: N/A;  VT, ICD Gen Change and Lead Revision, MDT, MAC, DM,3 Prep    TREATMENT OF CARDIAC ARRHYTHMIA  10/18/2019    Procedure: Cardioversion or Defibrillation;  Surgeon: Raz Wagner MD;  Location: [facility] EP LAB;  Service: Cardiology;;     Family History   Problem Relation Age of Onset    Hypertension Father     Diabetes Father     Coronary artery disease Father     Heart disease Father         CHF    No Known Problems Mother     Cancer Sister 54        breast CA    No Known Problems Brother     Anxiety disorder Neg Hx     Depression Neg Hx     Dementia Neg Hx     Bipolar disorder Neg Hx     Suicide Neg Hx      Social History     Tobacco Use    Smoking status: Former Smoker     Packs/day: 1.00     Years: 31.00     Pack years: 31.00     Types: Cigarettes     Quit date: 2018     Years since quittin.5    Smokeless tobacco: Never Used    Tobacco comment: pt is quiting on his own - pt stated not qualified for program;  pt  quit on his own   Substance Use Topics    Alcohol use: No     Alcohol/week: 0.0 standard drinks     Comment: quit    Drug use: No        Review of Systems:  Review of systems not obtained due to patient factors intubated and sedated.    OBJECTIVE:     Vital Signs (Most Recent):  Temp: 97.88 °F (36.6 °C) (22)  Pulse: 95 (22)  Resp: 14 (22)  BP:  (Pt refused) (22 0815)  SpO2: 100 % (22  0852)    Ventilator Data (Last 24H):     Vent Mode: A/C  Oxygen Concentration (%):  [] 100  Resp Rate Total:  [14 br/min-18 br/min] 14 br/min  Vt Set:  [400 mL] 400 mL  PEEP/CPAP:  [5 cmH20] 5 cmH20  Mean Airway Pressure:  [9 cmH20] 9 cmH20    Hemodynamic Parameters (Last 24H):       Physical Exam:  General: intubated and sedated  Neuro: intubated and sedated  HEENT: normocephalic and atraumatic  Cardio: on V-A ecmo; LVAD in place and functioning; sternal WV in place, holding suction; chest tubes x4  Resp: Mechanically ventilated  Abd: Soft, NT, ND  Ext: V-A canula sites to R groin, intact. Warm and well perfused      Lines/Drains:  Introducer 07/13/22 left internal jugular (Active)   Number of days: 0       Percutaneous Central Line Insertion/Assessment - Triple Lumen  07/02/22 1522 right internal jugular (Active)   Line Necessity Review Hemodynamic instability 07/12/22 2300   Verification by X-ray Yes 07/07/22 1505   Site Assessment No drainage;No swelling;No redness;No warmth 07/12/22 2300   Line Securement Device Secured with sutures 07/12/22 2300   Dressing Type Biopatch in place;Central line dressing 07/12/22 2300   Dressing Status Clean;Dry;Intact 07/12/22 2300   Dressing Intervention Integrity maintained 07/12/22 2300   Date on Dressing 07/07/22 07/12/22 2300   Dressing Due to be Changed 07/14/22 07/12/22 2300   Distal Patency/Care infusing 07/12/22 2300   Medial 1 Patency/Care normal saline lock 07/12/22 2300   Proximal 1 Patency/Care infusing 07/12/22 2300   Waveform Normal 07/12/22 2300   Number of days: 11       Percutaneous Central Line Insertion/Assessment - Triple Lumen  07/13/22 0934 left internal jugular (Active)   Number of days: 0       Percutaneous Central Line Insertion/Assessment - Quad Lumen  07/13/22 0941 left internal jugular (Active)   Number of days: 0       Trialysis (Dialysis) Catheter 07/13/22 2100 right internal jugular (Active)   Number of days: 0            Midline Catheter  Insertion/Assessment  - Single Lumen 06/28/22 1027 Right cephalic vein (lateral side of arm) 20g x 8cm (Active)   Site Assessment Clean;Dry;Intact 07/12/22 2300   IV Device Securement catheter securement device 07/12/22 2300   Line Status Saline locked 07/12/22 2300   Dressing Type Biopatch in place;Central line dressing 07/12/22 2300   Dressing Status Clean;Dry;Intact 07/12/22 2300   Dressing Intervention Integrity maintained 07/12/22 2300   Dressing Change Due 07/14/22 07/12/22 2300   Site Change Due 07/26/22 07/12/22 2300   Reason Not Rotated Not due 07/12/22 2300   Number of days: 15       Arterial Line 07/13/22 0932 Left Other (Comment) (Active)   Number of days: 0       Arterial Line 07/13/22 2000 Right Radial (Active)   Number of days: 0            VAD 07/13/22 1300 Left ventricular assist device HeartMate 3 (Active)   Number of days: 0            Chest Tube 07/13/22 1916 1 Right Pleural 32 Fr. (Active)   Output (mL) 50 mL 07/13/22 2200   Number of days: 0            Chest Tube 07/13/22 1916 2 Left Pleural 32 Fr. (Active)   Output (mL) 100 mL 07/13/22 2200   Number of days: 0            Chest Tube 07/13/22 1916 3 Anterior Mediastinal 32 Fr. (Active)   Output (mL) 20 mL 07/13/22 2200   Number of days: 0            Chest Tube 07/13/22 1916 4 Posterior Mediastinal 32 Fr. (Active)   Output (mL) 100 mL 07/13/22 2200   Number of days: 0            NG/OG Tube 07/13/22 2100 orogastric 16 Fr. Right mouth (Active)   Number of days: 0            Urethral Catheter 07/13/22 0848 Temperature probe;Latex 14 Fr. (Active)   Output (mL) 125 mL 07/13/22 2200   Number of days: 0       Laboratory:  CBC:   Recent Labs   Lab 07/13/22 2149   WBC 12.62   RBC 2.99*   HGB 8.8*   HCT 26.5*   PLT 52*   MCV 89   MCH 29.4   MCHC 33.2       CMP:   Recent Labs   Lab 07/13/22 2149   *   CALCIUM 10.0   ALBUMIN 2.3*   PROT 3.7*   *   K 3.2*  3.2*  3.2*   CO2 18*      BUN 17   CREATININE 2.1*   ALKPHOS 39*   ALT 63*   AST  119*   BILITOT 2.6*       Coagulation:   Recent Labs   Lab 07/13/22  2149   INR 1.4*   APTT 87.5*       Cardiac markers: No results for input(s): CKMB, CPKMB, TROPONINT, TROPONINI, MYOGLOBIN in the last 168 hours.    ABGs:   Recent Labs   Lab 07/13/22 2145   PH 7.239*   PCO2 42.7   PO2 78*   HCO3 18.3*   POCSATURATED 93*   BE -9         Chest X-Ray:    Diagnostic Results:        ASSESSMENT/PLAN:   Tim Richards is a 56 y.o. male HFrEF with an EF of 10% and a history of HM2 LVAD in 2018 who is now s/p HM3 upgrade, initiation of V-A ECMO after failure to wean off bypass x2        Neuro/Psych:   -- Sedation: propofol  -- Pain: PRN dilaudid  -- continue sedation while chest open             Cards:   -- Pt received 18 pRBCs, 16 FFP, 2 plt, 25 cryo; 1500 albumin intra-op  -- on V-A ECMO  -- LVAD in place and functioning appropriately  -- wean pressor support as able  -- lactate 12, will continue fluid resuscitation  -- FFP drip; platelets      Pulm:   -- Goal O2 sat > 90%  -- Continue mechanical ventilation; back up rate ready if needed  -- continue q1h ABG      Renal:  -- Keep sun for strict I/O  -- BUN/Cr 17/2.1  -- adequate UOP since return from OR, >100cc/hr      FEN / GI:   -- Net +11L  -- Replace lytes as needed  -- Nutrition: NPO  -- GI ppx: protonix      ID:   -- Tm: afebrile; WBC 12.62  -- vanc/cefazolin for open chest      Heme/Onc:   -- hgb 8.8  -- AM CBC      Endo:   -- Endocrine consulted, appreciate recs  -- BG goal 110-140      PPx:   Feeding: NPO  Analgesia/Sedation: PRN dilaudid/prop  Thromboembolic prevention: holding  HOB >30: yes  Stress Ulcer ppx: protonix  Glucose control: Critical care goal 110-140 g/dl, ISS    Lines/Drains/Airway: ETT; OG; ECMO canula x2; RIJ trialysis, LIJ CVC and gertrude, r-radial a-line, Chest tube x4; sun; WV, VAD; midline      Dispo/Code Status/Palliative:   -- SICU / Full Code    Sebas Beatty  Transplant Surgery [facility]

## 2022-07-14 NOTE — TRANSFER OF CARE
"Anesthesia Transfer of Care Note    Patient: Tim Richards    Procedure(s) Performed: Procedure(s) (LRB):  REPLACEMENT, PUMP (N/A)  REPAIR, AORTIC VALVE (N/A)  LYSIS, ADHESIONS    Patient location: ICU    Anesthesia Type: general    Transport from OR: Continuous ECG monitoring in transport. Continuous SpO2 monitoring in transport. Continuos invasive BP monitoring in transport. Transported from OR intubated on 100% O2 by AMBU with adequate controlled ventilation    Post pain: adequate analgesia    Post vital signs: stable    Level of consciousness: sedated    Nausea/Vomiting: no nausea/vomiting    Transfer of care protocol was followed      Last vitals:   Visit Vitals  BP (!) 84/0 (BP Location: Left arm, Patient Position: Lying)   Pulse 80   Temp 36.8 °C (98.3 °F) (Oral)   Resp (!) 45   Ht 6' 0.84" (1.85 m)   Wt 104.5 kg (230 lb 6.1 oz)   SpO2 (!) 60%   BMI 30.53 kg/m²     "

## 2022-07-14 NOTE — ASSESSMENT & PLAN NOTE
Tim Richards is a 56 y.o. male HFrEF with an EF of 10% and a history of HM2 LVAD in 2018 who is now s/p HM3 upgrade, initiation of V-A ECMO after failure to wean off bypass x2      Neuro/Psych:   -- Sedation: Propofol.  -- Pain: PRN Dilaudid             Cards:   -- S/P LVAD exchange on 7/14/22  -- Requiring high dose pressors:   Epi 0.12   Levo 0.1   Vaso 0.04   Dopamine 6   Giapreza 60  -- Stress dose steroids  -- Ventricular pacing wires. Paced at 80 BPM  -- MAP goal >65  -- VAD and ECMO flows stable  -- Plan to return to the OR today for washout**      Pulm:   -- Goal O2 sat > 90%  -- ABG PRN  -- Mediastinal chest tubes x2 and pleural chest tube x2 to suction  -- Nitric at 20 ppm      Renal:  -- Keep sun for strict I/O  -- Trend BUN/Cr 20/2.4  -- Adequate UOP after 20  of lasix last night  -- Starting a lasix drip      FEN / GI:   -- Replace lytes as needed  -- Nutrition: NPO  -- GI ppx: pantoprazole      ID:   -- Tm: afebrile; WBC stable  -- Ancef and vanc      Heme/Onc:   -- H/H stable   -- Daily CBC  -- Received 18 pRBCs, 16 FFP, 2 plt, 25 cryo; 1500 albumin intra-op  -- FFP drip  -- Coags beginning to normalize      Endo:   -- BG goal 140-180  -- Insulin gtt      PPx:   Feeding: NPO  Analgesia/Sedation: Propofol / PRN Dilaudid  Thromboembolic prevention: SCDs  HOB >30: Yes  Stress Ulcer ppx: PPI  Glucose control: Critical care goal 140-180 g/dl, ISS     Lines/Drains/Airway: ETT; OG; ECMO canula x2; RIJ trialysis, LIJ CVC and gertrude, r-radial a-line, Chest tube x4; sun; WV, VAD; midline      Dispo/Code Status/Palliative:   -- SICU / Full Code.   -- Plan to return to the OR today

## 2022-07-14 NOTE — PROGRESS NOTES
EC[facility] Specialists shift report    Date: 07/14/2022  ECMO Specialist:  Ivon Castillo    Pump parameters:  RPM: 4636-3328  Flow:  3-3.1  Sweep:  4  FiO2:  100%    Oxygenator status:  Clots:   Fibrin: connectors     Pressure trends:  P1: 235-262  P2: 217-240  Delta P: 17-20    Volume status:  Chugging noted? No   CVP: 12-13  MAP:  65-75  MD notified (name):  Dr Kaufman    Anticoagulation:  ACT/aPTT/Xa parameters: patient is not on anticoagulation   ACT/aPTT/Xa trends this shift:     Cannula size / status / placement:  Return cannula / from circuit to patient: 18Fr cannula in the RFA @ 9.5cm  Drainage cannula / from patient to circuit: 27Fr cannula in the RFV @ 5.5cm       Additional Comments:      Patient maintained on documented ECMO settings. RPMs adjusted to maintain a flow goal of 3L. Sweep adjusted per ABGs. Lactates trending down this shift, last one was 2.63. Patient remains on pressors and sedation. Does follow commands. He was taken to OR for chest washout and bronch. He tolerated these procedures well and there were no complications during the transportation to and from the OR.

## 2022-07-14 NOTE — PROGRESS NOTES
07/13/2022  Perfusionist:  Estephania Martinez CCP, LP  Surgeon(s) and Role:     * Yg Kaufman MD - Primary     * Hardeep Biswas MD - Resident - Assisting     * Angelo Avina MD - Fellow     * Sanya Castro MD - Co-Surgeon  Anesthesiologist:  No responsible provider has been recorded for the case.  Primary Perfusionist:: JARON Gómez CCP, CCP, LP Jill Morgan, CCP, LP    Past Medical History:   Diagnosis Date    Anticoagulant long-term use     CHF (congestive heart failure)     Class 1 obesity due to excess calories with serious comorbidity and body mass index (BMI) of 31.0 to 31.9 in adult     Dilated cardiomyopathy 1/10/2018    Disorder of kidney and ureter     CKD    Encounter for blood transfusion     Gout     HTN (hypertension)     Hx of psychiatric care     ICD (implantable cardioverter-defibrillator) infection 7/1/2020    Psychiatric problem     Thyroid disease     Ventricular tachycardia (paroxysmal)      Patient continued to decompensate after being weaned of bypass for the second time and the decision was made to place the patient on VA ECMO. A 27 [place] drainage cannula was placed in the right femoral vein and an 18 [place] return cannula was placed in the right femoral artery. ECMO was initiated at 1950. Flows were increased as LVAD speeds were decreased. With BRITTANY guidance from anesthesia the patient settled at 3600 RPM's and a flow of 3.14 LPM. The LVAD was set at 6000 RPM's with a flow of 5.2 LPM. Patient's blood pressure started to stabilize shortly after initiation.     For implantable VADs  Pump assembled and wet-tested by: Mejia Shane CCP, LP  Pump SN: [id number]  Primary controller: [id number]  Backup controller: [id number]  For Heartmate3 devices: The VAD was started in extended alarm silence at 3000 RPMs per  recommendation.  Initially at 3000 RPMs, the PI was N/A and the power was N/A while flow did not register.  Note the VAD was started  while the patient was still on full cardiopulmonary bypass.  As the bypass machine was weaned by the primary perfusionist, the VAD speed was changed multiple times by Estephania Martinez under the direction of Surgeon(s) and Role:     * Yg Kaufman MD - Primary     * Hardeep Biswas MD - Resident - Assisting     * Angelo Avina MD - Fellow     * Sanya Castro MD - Co-Surgeon and No responsible provider has been recorded for the case. utilizing BRITTANY guidance to assess LV and/or RV function, as well as ventricular filling/emptying.  Those changes occurred rapidly over the first few minutes of weaning from bypass and conversion to full VAD support, and due to the extremely fluid nature of this process, those rapid changes are not documented here.  The initial VAD parameters once stable are listed in the table below:    Initial VAD parameters at OR exit:  Fixed speed (RPMs) Low speed (RPMs) Flow (LPMs) PI Power (w) Extended alarm silence cancelled?   6000 5600 5.2 1.6 4.8 [x]       At the end of the procedure, the current device settings were verified to be accurate.  The patient was transported post operatively with back-up/emergency equipment that was delivered to the patient's room and verified by the bedside nurse, and report was given.  I was available by phone for management and troubleshooting concerns by the unit staff.  There were no issues.    10:25 PM

## 2022-07-14 NOTE — CARE UPDATE
Patient's [employer] S-ICD was re-programmed: tachytherapies turned on, post-shock pacing turned on.        Nathen Lowry MD  [facility] PGY-5

## 2022-07-14 NOTE — PROGRESS NOTES
07/14/2022  Shirley Porras    Current provider:  Yg Kaufman MD    Device interrogation:  TXP LVAD INTERROGATIONS 7/14/2022 7/14/2022 7/14/2022 7/14/2022 7/14/2022 7/14/2022 7/14/2022   Type HeartMate3 HeartMate3 HeartMate3 HeartMate3 HeartMate3 HeartMate3 HeartMate3   Flow 5.1 5.1 5.2 5.2 5.1 5 5   Speed 6000 6000 6000 6000 6000 6000 6000   PI 2.3 1.5 1.6 1.6 1.5 2 1.7   Power ([place]) 4.8 4.8 4.7 4.8 4.7 4.7 4.7   LSL 5600 5600 5600 5600 5600 5600 5600   Pulsatility - - - - - - -          Rounded on Timmariama Richards to ensure all mechanical assist device settings (IABP or VAD) were appropriate and all parameters were within limits.  I was able to ensure all back up equipment was present, the staff had no issues, and the Perfusion Department daily rounding was complete.      For implantable VADs: Interrogation of Ventricular assist device was performed with analysis of device parameters and review of device function. I have personally reviewed the interrogation findings and agree with findings as stated.     In emergency, the nursing units have been notified to contact the perfusion department either by:  Calling [phone] from 630am to 4pm Mon thru Fri, utilizing the On-Call Finder functionality of Epic and searching for Perfusion, or by contacting the hospital  from 4pm to 630am and on weekends and asking to speak with the perfusionist on call.    7:24 AM

## 2022-07-14 NOTE — PROGRESS NOTES
Anesthesia team at bedside, patient connected to portable monitor, ambu-bag with portable oxygen. Patient connected to LVAD batteries, and back up controller sent with patient.  Patient transported from SICU [id number] to OR with all continuous infusions, nitric oxide, chest tubes, sun, and ECMO per anesthesia team and perfusion team. Consents obtained and chart sent with patient. Care assumed per Anesthesia team.

## 2022-07-14 NOTE — CARE UPDATE
Updated Dr. Avina with current VS, CVP, Chest Tube output, UOP and ABG. Will give lasix 20IVP, 2g mag, steroids. Repeat labs to be sent.

## 2022-07-14 NOTE — ANESTHESIA PREPROCEDURE EVALUATION
[facility]  Anesthesia Pre-Operative Evaluation         Patient Name: Tim Richards  YOB: 1965  MRN: [id number]    SUBJECTIVE:     Pre-operative evaluation for Procedure(s) (LRB):  CLOSURE, WOUND, STERNUM (N/A)  INSERTION-RIGHT VENTRICULAR ASSIST DEVICE (Right)     07/14/2022    Tim Richards is a 56 y.o. male w/ a significant PMHx of severe HFrEF 2/2 NICM (EF 13%), CKD3, HTN, VT (s/p AICD in 2014), GIB, Obesity. He is s/p HM2 LVAD in 2018 with HM3 upgrade on 7/13/2022 c/b failure to wean off CPB, now with V-A ECMO in place. Transported to SICU post-op on , vas, epi, dopamine, and giapreza.    Patient now presents for the above procedure(s).    Prev airway:   Intubation     Date/Time: 7/13/2022 8:48 AM  Performed by: Juanito Dimas MD  Authorized by: Andrae Barrett Jr., MD      Intubation:     Induction:  Intravenous    Intubated:  Postinduction    Mask Ventilation:  Easy with oral airway    Attempts:  1    Attempted By:  Resident anesthesiologist    Method of Intubation:  Video laryngoscopy    Blade:  Booth 3    Laryngeal View Grade: Grade I - full view of cords      Difficult Airway Encountered?: No      Complications:  None    Airway Device:  Oral endotracheal tube    Airway Device Size:  7.5    Style/Cuff Inflation:  Cuffed    Tube secured:  22    Secured at:  The lips    Placement Verified By:  Capnometry and Revisualization with laryngoscopy    Complicating Factors:  None    Findings Post-Intubation:  BS equal bilateral and atraumatic/condition of teeth unchanged    LDA:   Introducer 07/13/22 0900 left internal jugular (Active)   Site Assessment No drainage;No redness;No swelling 07/14/22 0303   Line Securement Device Secured with sutures 07/14/22 0303   Dressing Type Biopatch in place;Central line dressing 07/14/22 0303   Dressing Status Clean;Dry;Intact 07/14/22 0303   Dressing Intervention First dressing 07/14/22 0303   Date on Dressing 07/13/22 07/14/22  0303   Dressing Due to be Changed 07/20/22 07/14/22 0303   Line Necessity Review Hemodynamic instability 07/14/22 0303   Number of days: 0       Pulmonary Artery Catheter Assessment  07/13/22 0900 left internal jugular (Active)   Verification by X-ray Yes 07/13/22 2200   Site Assessment No drainage;No redness;No swelling 07/14/22 0303   Line Securement Device Secured with sutures 07/14/22 0303   Dressing Type Biopatch in place;Central line dressing 07/14/22 0303   Dressing Status Clean;Dry;Intact 07/14/22 0303   Dressing Intervention First dressing 07/14/22 0303   Date on Dressing 07/13/22 07/13/22 2200   Dressing Due to be Changed 07/20/22 07/13/22 2200   Current Insertion Depth (cm) 66 cm 07/14/22 0303   Waveform Normal 07/14/22 0303   Line Necessity Review Hemodynamic instability 07/13/22 2200   Number of days: 0            ECMO Cannula 07/13/22 right femoral vein;right femoral artery (Active)   Site Assessment Not assessed 07/14/22 0303   Dressing Type Gauze;Transparent (Tegaderm) 07/14/22 0303   Dressing Status New drainage 07/14/22 0303   Dressing Intervention First dressing 07/14/22 0303   Daily Line Review Performed 07/14/22 0303   Number of days: 1       Percutaneous Central Line Insertion/Assessment - Quad Lumen  07/13/22 0941 left internal jugular (Active)   Line Necessity Review Hemodynamic instability 07/13/22 2200   $ Central Line Charges (Upon insertion) Catheter - Quad Lumen (Supply) 07/13/22 2200   Verification by X-ray Yes 07/13/22 2200   Site Assessment No drainage;No redness;No swelling;No warmth 07/14/22 0303   Line Securement Device Secured with sutures 07/14/22 0303   Dressing Status Clean;Dry;Intact 07/14/22 0303   Dressing Intervention First dressing 07/14/22 0303   Date on Dressing 07/13/22 07/13/22 2200   Dressing Due to be Changed 07/20/22 07/13/22 2200   Distal Patency/Care infusing 07/14/22 0303   Medial 1 Patency/Care infusing 07/14/22 0303   Medial 2 Patency/Care infusing 07/14/22  0303   Proximal 1 Patency/Care infusing 07/14/22 0303   Waveform Not being transduced 07/14/22 0303   Number of days: 0       Trialysis (Dialysis) Catheter 07/13/22 2100 right internal jugular (Active)   Verification by X-ray Yes 07/13/22 2200   Site Assessment No drainage;No redness;No swelling;No warmth 07/14/22 0303   Line Securement Device Secured with sutures 07/13/22 2303   Dressing Type Biopatch in place;Central line dressing 07/14/22 0303   Dressing Status Clean;Dry;Intact 07/14/22 0303   Dressing Intervention First dressing 07/14/22 0303   Date on Dressing 07/13/22 07/14/22 0303   Dressing Due to be Changed 07/20/22 07/13/22 2200   Venous Patency/Care infusing 07/14/22 0303   Arterial Patency/Care infusing 07/14/22 0303   Distal Patency/Care infusing 07/14/22 0303   Waveform Not being transduced 07/14/22 0303   Line Necessity Review Hemodynamic instability 07/13/22 2200   Number of days: 0            Midline Catheter Insertion/Assessment  - Single Lumen 06/28/22 1027 Right cephalic vein (lateral side of arm) 20g x 8cm (Active)   Site Assessment Clean;Dry;Intact;No redness;No swelling 07/14/22 0303   IV Device Securement catheter securement device 07/12/22 2300   Line Status Infusing 07/14/22 0303   Dressing Type Biopatch in place;Central line dressing 07/14/22 0303   Dressing Status Clean;Dry;Intact 07/14/22 0303   Dressing Intervention Integrity maintained 07/14/22 0303   Dressing Change Due 07/14/22 07/14/22 0303   Site Change Due 07/26/22 07/13/22 2200   Reason Not Rotated Not due 07/14/22 0303   Number of days: 15       Arterial Line 07/13/22 0932 Left Brachial (Active)   Site Assessment Clean;Dry;Intact;No redness;No swelling 07/14/22 0303   Line Status Pulsatile blood flow 07/14/22 0303   Art Line Waveform Appropriate;Square wave test performed 07/14/22 0303   Arterial Line Interventions Zeroed and calibrated;Leveled;Connections checked and tightened;Flushed per protocol 07/14/22 0303    Color/Movement/Sensation Capillary refill less than 3 sec 07/14/22 0303   Dressing Type Transparent (Tegaderm) 07/14/22 0303   Dressing Status Dry;Clean;Intact 07/14/22 0303   Dressing Intervention First dressing 07/14/22 0303   Dressing Change Due 07/17/22 07/14/22 0303   Tubing Change Due 07/17/22 07/13/22 2200   Number of days: 0       Arterial Line 07/13/22 2000 Right Radial (Active)   Site Assessment Clean;Dry;Intact;No redness;No swelling 07/14/22 0303   Line Status Pulsatile blood flow 07/14/22 0303   Art Line Waveform Appropriate;Square wave test performed 07/14/22 0303   Arterial Line Interventions Zeroed and calibrated;Tubing changed;Connections checked and tightened;Flushed per protocol 07/14/22 0303   Color/Movement/Sensation Capillary refill less than 3 sec 07/14/22 0303   Dressing Type Transparent (Tegaderm) 07/14/22 0303   Dressing Status Clean;Dry;Intact 07/14/22 0303   Dressing Intervention First dressing 07/14/22 0303   Dressing Change Due 07/17/22 07/14/22 0303   Tubing Change Due 07/17/22 07/13/22 2200   Number of days: 0            VAD 07/13/22 1300 Left ventricular assist device HeartMate 3 (Active)   $ Ventricular Assist Device (VAD) Supplies LVAD Kit (30 Day Supply) 07/13/22 2200   Site Location Abdomen left 07/14/22 0303   Site Assessment Other (Comment) 07/14/22 0303   Driveline Assessment Free of Kinks;Intact 07/14/22 0303   Dressing Status Clean;Dry;Intact 07/14/22 0303   Dressing Intervention First dressing 07/14/22 0303   Dressing Change Schedule Daily 07/14/22 0303   Dressing Change Due 07/14/22 07/14/22 0303   Driveline [place] in use [place] deng 07/14/22 0303   Condition CDI 07/14/22 0303   Date changed 07/13/22 07/14/22 0303   Number of days: 0            Chest Tube 07/13/22 1916 1 Right Pleural 32 Fr. (Active)   Chest Tube WDL ex 07/14/22 0730   Function -20 cm H2O 07/14/22 0730   Air Leak/Fluctuation air leak not present;fluctuation not present;connections tightened;dependent  drainage cleared 07/14/22 0730   Safety all tubing connections taped;2 rubber-tipped hemostats w/ patient;all connections secured;suction checked 07/14/22 0730   Securement tubing secured to body distal to insertion site w/ tape 07/14/22 0730   Patency Intervention Tip/tilt 07/14/22 0730   Drainage Description Sanguineous 07/14/22 0730   Dressing Appearance occlusive gauze dressing intact;w/ moist drainage 07/14/22 0730   Dressing Care dressing changed 07/14/22 0730   Left Subcutaneous Emphysema none present 07/14/22 0730   Right Subcutaneous Emphysema none present 07/14/22 0730   Site Assessment Draining 07/14/22 0730   Surrounding Skin Intact 07/14/22 0730   Output (mL) 0 mL 07/14/22 0800   Number of days: 0            Chest Tube 07/13/22 1916 2 Left Pleural 32 Fr. (Active)   Chest Tube WDL ex 07/14/22 0730   Function -20 cm H2O 07/14/22 0730   Air Leak/Fluctuation air leak not present;fluctuation not present;connections tightened;dependent drainage cleared 07/14/22 0730   Safety all tubing connections taped;2 rubber-tipped hemostats w/ patient;all connections secured;suction checked 07/14/22 0730   Securement tubing secured to body distal to insertion site w/ tape 07/14/22 0730   Patency Intervention Tip/tilt 07/14/22 0730   Drainage Description Serosanguineous 07/14/22 0303   Dressing Appearance occlusive gauze dressing intact;clean and dry 07/14/22 0303   Dressing Care dressing changed 07/14/22 0303   Left Subcutaneous Emphysema none present 07/14/22 0303   Right Subcutaneous Emphysema none present 07/14/22 0303   Site Assessment Clean 07/14/22 0303   Surrounding Skin Intact 07/14/22 0303   Output (mL) 310 mL 07/14/22 0800   Number of days: 0            Chest Tube 07/13/22 1916 3 Anterior Mediastinal 32 Fr. (Active)   Chest Tube WDL ex 07/14/22 0303   Function -20 cm H2O 07/14/22 0303   Air Leak/Fluctuation air leak not present;fluctuation not present;connections tightened;dependent drainage cleared 07/14/22  0303   Safety all tubing connections taped;2 rubber-tipped hemostats w/ patient;all connections secured;suction checked 07/14/22 0303   Securement tubing secured to body distal to insertion site w/ tape 07/14/22 0303   Patency Intervention Tip/tilt 07/14/22 0303   Drainage Description Serosanguineous 07/14/22 0303   Dressing Appearance occlusive gauze dressing intact;clean and dry 07/14/22 0303   Dressing Care dressing changed 07/14/22 0303   Left Subcutaneous Emphysema none present 07/14/22 0303   Right Subcutaneous Emphysema none present 07/14/22 0303   Site Assessment Clean 07/14/22 0303   Surrounding Skin Intact 07/14/22 0303   Output (mL) 0 mL 07/14/22 0800   Number of days: 0            Chest Tube 07/13/22 1916 4 Posterior Mediastinal 32 Fr. (Active)   Chest Tube WDL ex 07/14/22 0303   Function -20 cm H2O 07/14/22 0303   Air Leak/Fluctuation air leak not present;fluctuation not present;connections tightened;dependent drainage cleared 07/14/22 0303   Safety all tubing connections taped;2 rubber-tipped hemostats w/ patient;all connections secured;suction checked 07/14/22 0303   Securement tubing secured to body distal to insertion site w/ tape 07/14/22 0303   Patency Intervention Tip/tilt 07/14/22 0303   Drainage Description Serosanguineous 07/14/22 0303   Dressing Appearance occlusive gauze dressing intact;clean and dry 07/14/22 0303   Dressing Care drainage area marked 07/14/22 0303   Left Subcutaneous Emphysema none present 07/14/22 0303   Right Subcutaneous Emphysema none present 07/14/22 0303   Site Assessment Clean 07/14/22 0303   Surrounding Skin Dry 07/14/22 0303   Output (mL) 0 mL 07/14/22 0800   Number of days: 0            NG/OG Tube 07/13/22 2100 orogastric 16 Fr. Right mouth (Active)   Placement Check placement verified by x-ray 07/14/22 0303   Tolerance no signs/symptoms of discomfort 07/14/22 0303   Securement secured to commercial device 07/14/22 0303   Clamp Status/Tolerance unclamped 07/14/22  "0303   Suction Setting/Drainage Method suction at;low;intermittent setting 07/14/22 0303   Drainage None 07/14/22 0303   Number of days: 0            Urethral Catheter 07/13/22 0848 Temperature probe;Latex 14 Fr. (Active)   Site Assessment Clean;Intact 07/14/22 0303   Collection Container Urimeter 07/14/22 0303   Securement Method secured to top of thigh w/ adhesive device 07/14/22 0303   Catheter Care Performed yes 07/14/22 0303   Reason for Continuing Urinary Catheterization Critically ill in ICU and requiring hourly monitoring of intake/output;Post operative 07/14/22 0303   CAUTI Prevention Bundle Intact seal between catheter & drainage tubing;Securement Device in place with 1" slack;Drainage bag/urimeter off the floor;Sheeting clip in use;No dependent loops or kinks;Drainage bag/urimeter not overfilled (<2/3 full);Drainage bag/urimeter below bladder 07/14/22 0303   Output (mL) 110 mL 07/14/22 0800   Number of days: 0       Drips:    angiotensin II 60 ng/kg/min (07/14/22 0800)    DOPamine 6 mcg/kg/min (07/14/22 0800)    EPINEPHrine 0.12 mcg/kg/min (07/14/22 0800)    furosemide (LASIX) 2 mg/mL continuous infusion (non-titrating) 5 mg/hr (07/14/22 0800)    insulin regular 1 units/mL infusion orderable (CTS POST-OP) 14.8 Units/hr (07/14/22 0800)    nitric oxide gas      NORepinephrine bitartrate-D5W 0.1 mcg/kg/min (07/14/22 0800)    propofoL 30 mcg/kg/min (07/14/22 0800)    vasopressin 0.04 Units/min (07/14/22 0800)       Patient Active Problem List   Diagnosis    Cigarette nicotine dependence without complication    Alcohol abuse    Pulmonary nodule seen on imaging study    Chronic combined systolic and diastolic heart failure    High risk medications (amiodarone) long-term use    Acute on chronic heart failure    CKD (chronic kidney disease), stage III    Hypertensive cardiovascular-renal disease, stage 1-4 or unspecified chronic kidney disease, with heart failure    Palliative care encounter    " LVAD (left ventricular assist device) present    Hyperbilirubinemia    Anemia    Hypertension    Anticoagulation monitoring, INR range 2-3    Left ventricular assist device (LVAD) complication    Pre-transplant evaluation for heart transplant    GIB (gastrointestinal bleeding)    Thrombocytopenia, unspecified    GI bleed    History of bleeding ulcers    Shortness of breath    Sustained ventricular tachycardia    Class 1 obesity due to excess calories with serious comorbidity and body mass index (BMI) of 32.0 to 32.9 in adult    amiodarone induced hypothyrodism    Heart replaced by heart assist device    Substance or medication-induced sleep disorder, insomnia type    VF (ventricular fibrillation)    Elevated serum lactate dehydrogenase (LDH)    Essential hypertension    CHICHI (obstructive sleep apnea)    Gout    Dilated cardiomyopathy    Acute blood loss anemia    Implantable cardioverter-defibrillator (ICD) discharge    Post traumatic stress disorder (PTSD)    Diuretic-induced hypokalemia    COVID-19    Acute on chronic combined systolic and diastolic congestive heart failure    History of ventricular tachycardia    Anxiety    Advance care planning       Review of patient's allergies indicates:   Allergen Reactions    Lisinopril Anaphylaxis    Hydralazine analogues      Chronic constipation, impotence, dizziness       Current Inpatient Medications:   calcium gluconate IVPB  1 g Intravenous Once    ceFEPime (MAXIPIME) IVPB  2 g Intravenous Q12H    hydrocortisone sodium succinate  100 mg Intravenous Q8H    levothyroxine  112 mcg Per OG tube Before breakfast    mupirocin   Nasal BID    pantoprazole  40 mg Intravenous BID       No current facility-administered medications on file prior to encounter.     Current Outpatient Medications on File Prior to Encounter   Medication Sig Dispense Refill    allopurinoL (ZYLOPRIM) 100 MG tablet TAKE 1 TABLET (100 MG) BY MOUTH NIGHTLY. 90 tablet  3    ALPRAZolam (XANAX) 1 MG tablet Take 1 tablet (1 mg total) by mouth daily as needed for Anxiety. Bid prn 30 tablet 1    amiodarone (PACERONE) 200 MG Tab Take 2 tablets (400 mg total) by mouth once daily. 180 tablet 3    amLODIPine (NORVASC) 10 MG tablet TAKE 1 TABLET BY MOUTH EVERY DAY 90 tablet 3    aspirin (ECOTRIN) 81 MG EC tablet Take 1 tablet (81 mg total) by mouth once daily. 30 tablet 11    busPIRone (BUSPAR) 5 MG Tab Take 1 tablet (5 mg total) by mouth 3 (three) times daily. 90 tablet 1    levothyroxine (SYNTHROID) 112 MCG tablet Take 1 tablet (112 mcg total) by mouth before breakfast. 90 tablet 3    mexiletine (MEXITIL) 250 MG Cap Take 1 capsule (250 mg total) by mouth every 8 (eight) hours. 270 capsule 3    pantoprazole (PROTONIX) 40 MG tablet TAKE 1 TABLET (40 MG TOTAL) BY MOUTH DAILY WITH LUNCH. 90 tablet 3    warfarin (COUMADIN) 5 MG tablet TAKE 7.5 MG ORALLY DAILY EVERY SUNDAY AND THURSDAY, TAKE 5 MG ORALLY DAILY ON OTHER DAYS 135 tablet 3    zolpidem (AMBIEN CR) 12.5 MG CR tablet Take 1 tablet (12.5 mg total) by mouth nightly as needed for Insomnia. 30 tablet 5    [DISCONTINUED] ferrous gluconate (FERGON) 324 MG tablet TAKE 1 TABLET (324 MG TOTAL) BY MOUTH WITH LUNCH. 90 tablet 3    [DISCONTINUED] sildenafiL (VIAGRA) 100 MG tablet Take 1 tablet (100 mg total) by mouth daily as needed for Erectile Dysfunction. 6 tablet 3    [DISCONTINUED] torsemide (DEMADEX) 20 MG Tab Take 1 tablet (20 mg total) by mouth once daily. 180 tablet 3       Past Surgical History:   Procedure Laterality Date    AORTIC VALVULOPLASTY N/A 7/13/2022    Procedure: REPAIR, AORTIC VALVE;  Surgeon: Yg Kaufman MD;  Location: [facility] OR [address] [place] [address];  Service: Cardiovascular;  Laterality: N/A;    CARDIAC CATHETERIZATION  Dec. 2012    CARDIAC DEFIBRILLATOR PLACEMENT Left     CRRT-D    COLONOSCOPY N/A 3/6/2018    Procedure: COLONOSCOPY;  Surgeon: Alonso Bone MD;  Location: [facility] (2ND FLR);  Service: Endoscopy;   Laterality: N/A;    COLONOSCOPY N/A 7/17/2019    Procedure: COLONOSCOPY;  Surgeon: Blane Valdez MD;  Location: [facility] ENDO (2ND FLR);  Service: Endoscopy;  Laterality: N/A;    COLONOSCOPY N/A 7/18/2019    Procedure: COLONOSCOPY;  Surgeon: Blane Valdez MD;  Location: [facility] ENDO (2ND FLR);  Service: Endoscopy;  Laterality: N/A;    ESOPHAGOGASTRODUODENOSCOPY N/A 7/17/2019    Procedure: EGD (ESOPHAGOGASTRODUODENOSCOPY);  Surgeon: Blane Valdez MD;  Location: [facility] ENDO (2ND FLR);  Service: Endoscopy;  Laterality: N/A;    ESOPHAGOGASTRODUODENOSCOPY N/A 7/18/2019    Procedure: EGD (ESOPHAGOGASTRODUODENOSCOPY);  Surgeon: Blane Valdez MD;  Location: [facility] ENDO (2ND FLR);  Service: Endoscopy;  Laterality: N/A;    LYSIS OF ADHESIONS  7/13/2022    Procedure: LYSIS, ADHESIONS;  Surgeon: Yg Kaufman MD;  Location: [facility] OR 2ND FLR;  Service: Cardiovascular;;    NONINVASIVE CARDIAC ELECTROPHYSIOLOGY STUDY N/A 10/18/2019    Procedure: CARDIAC ELECTROPHYSIOLOGY STUDY, NONINVASIVE;  Surgeon: Raz Wagner MD;  Location: [facility] EP LAB;  Service: Cardiology;  Laterality: N/A;  VT, DFTs, MDT CRTD in situ, LVAD, juarez MB, 3098    REPLACEMENT OF IMPLANTABLE CARDIOVERTER-DEFIBRILLATOR (ICD) GENERATOR N/A 3/9/2020    Procedure: REPLACEMENT, ICD GENERATOR;  Surgeon: Harry Yun MD;  Location: [facility] EP LAB;  Service: Cardiology;  Laterality: N/A;  VT, ICD Gen Change and Lead Revision, MDT, MAC, DM,3 Prep    REPLACEMENT OF LEFT VENTRICULAR ASSIST DEVICE (LVAD)  7/13/2022    Procedure: REPLACEMENT, LVAD;  Surgeon: Yg Kaufman MD;  Location: [facility] OR 2ND FLR;  Service: Cardiovascular;;    REPLACEMENT OF PUMP N/A 7/13/2022    Procedure: REPLACEMENT, PUMP;  Surgeon: Yg Kaufman MD;  Location: [facility] OR 2ND FLR;  Service: Cardiovascular;  Laterality: N/A;  LVAD pump exchange  EXPLANATION OF HEATMATE 2  IMPLANTATION OF HEARTMATE 3  IMPLANTATION OF 8MM CHIMNEY GRAFT TO RFA  INITIATION OF ECMO  TEMPORARY CLOSURE OF CHEST    REVISION OF  IMPLANTABLE CARDIOVERTER-DEFIBRILLATOR (ICD) ELECTRODE LEAD PLACEMENT N/A 3/9/2020    Procedure: REVISION, INSERTION, ELECTRODE LEAD, ICD;  Surgeon: Harry Yun MD;  Location: [facility] EP LAB;  Service: Cardiology;  Laterality: N/A;  VT, ICD Gen Change and Lead Revision, MDT, MAC, DM,3 Prep    TREATMENT OF CARDIAC ARRHYTHMIA  10/18/2019    Procedure: Cardioversion or Defibrillation;  Surgeon: Raz Wagner MD;  Location: [facility] EP LAB;  Service: Cardiology;;       Social History     Socioeconomic History    Marital status:     Number of children: 3   Occupational History     Employer: [employer]    Tobacco Use    Smoking status: Former Smoker     Packs/day: 1.00     Years: 31.00     Pack years: 31.00     Types: Cigarettes     Quit date: 2018     Years since quittin.5    Smokeless tobacco: Never Used    Tobacco comment: pt is quiting on his own - pt stated not qualified for program;  pt  quit on his own   Substance and Sexual Activity    Alcohol use: No     Alcohol/week: 0.0 standard drinks     Comment: quit    Drug use: No    Sexual activity: Yes     Partners: Female     Birth control/protection: None     Comment: 10/5/17  with same partner 7 years    Social History Narrative    Disabled       OBJECTIVE:     Vital Signs Range (Last 24H):  Temp:  [36.4 °C (97.52 °F)-37 °C (98.6 °F)]   Pulse:  []   Resp:  [12-25]   BP: (73)/(66)   SpO2:  [60 %-100 %]   Arterial Line BP: (65-75)/(47-68)       Significant Labs:  Lab Results   Component Value Date    WBC 13.44 (H) 2022    HGB 8.8 (L) 2022    HCT 29 (L) 2022     (L) 2022    CHOL 130 2022    TRIG 43 2022    HDL 46 2022     (H) 2022     (H) 2022     (H) 2022    K 3.7 2022     2022    CREATININE 2.4 (H) 2022    BUN 20 2022    CO2 23 2022    TSH 0.111 (L) 2022    PSA 0.99 2018    INR 1.3 (H)  07/14/2022    HGBA1C 5.4 04/26/2021       Diagnostic Studies: No relevant studies.    EKG:   Results for orders placed or performed during the hospital encounter of 06/27/22   EKG 12-lead    Collection Time: 07/07/22  5:59 AM    Narrative    Test Reason :     Vent. Rate : 092 BPM     Atrial Rate : 092 BPM     P-R Int : 088 ms          QRS Dur : 152 ms      QT Int : 504 ms       P-R-T Axes : 050 127 032 degrees     QTc Int : 623 ms    Probably Sinus rhythm with first degree AVB but Ps not well sen  Right axis deviation  Nonspecific intraventricular block  Possible Right ventricular hypertrophy  T wave abnormality, consider lateral ischemia  Abnormal ECG  When compared with ECG of 03-JUL-2022 13:28,  QRS voltage has decreased  Nonspecific T wave abnormality has replaced inverted T waves in Inferior  leads  QT has lengthened  Confirmed by Edmond MOSQUEDA MD ([id number]) on 7/7/2022 3:27:24 PM    Referred By: [email or web address]   SELF           Confirmed By:Edmond MOSQUEDA MD       2D ECHO:  TTE:  Results for orders placed or performed during the hospital encounter of 06/27/22   Echo   Result Value Ref Range    BSA 2.29 m2    [facility] CV RAMP SPEED 1 9,800     [facility] CV RAMP SPEED 2 10,200     [facility] CV RAMP LVEDD 1 9.9     [facility] CV RAMP LVEDD 2 10.4     EF 13 %    LVIDd 9.99 (A) 3.5 - 6.0 cm    PV peak gradient 1.67 mmHg    RVOT peak jorge 0.65 m/s    RVOT peak VTI 7.78 cm    PV mean gradient 0.86 mmHg    TR Max Jorge 2.70 m/s    LV Diastolic Volume 563.43 mL    LV Diastolic Volume Index 249.31 mL/m2    Triscuspid Valve Regurgitation Peak Gradient 29 mmHg    Narrative    · There is an LVAD present. Base speed is 9800 RPMs. The pump type is a   Heartmate II. The interventricular septum appears midline. The aortic   valve does not open.  · The left ventricle is severely enlarged with severe eccentric   hypertrophy and severely decreased systolic function.  · The estimated ejection fraction is 13%.  · Left ventricular diastolic dysfunction.  · There is severe  left ventricular global hypokinesis.  · Moderate-to-severe mitral regurgitation.  · Mild to moderate tricuspid regurgitation.  · Limited Speed echo results noted.          BRITTANY:  Results for orders placed or performed during the hospital encounter of 07/01/20   Transesophageal echo (BRITTANY)   Result Value Ref Range    BSA 2.5 m2    Narrative    · 1 cm circumferential pericardial fat pad; unchanged prior to or   following procedure.  · RA, RV and CS leads converge on lateral aspect of SVC prior to removal   during this device extraction.  · HeartMate II in place. AV does not open.  · Severely decreased left ventricular systolic function with global   hypokinesis. The estimated ejection fraction is 15%.  · Sucessfull removal of all endovascular hardware without pericardial   effusion.          ASSESSMENT/PLAN:       Pre-op Assessment    I have reviewed the Patient Summary Reports.     I have reviewed the Nursing Notes. I have reviewed the NPO Status.   I have reviewed the Medications.     Review of Systems  Anesthesia Hx:  No problems with previous Anesthesia Denies Hx of Anesthetic complications  History of prior surgery of interest to airway management or planning: heart surgery. Previous anesthesia: General Denies Family Hx of Anesthesia complications.   Denies Personal Hx of Anesthesia complications.   Cardiovascular:   Exercise tolerance: poor Pacemaker Hypertension Denies CAD.    Denies CABG/stent.  CHF Orthopnea PND no hyperlipidemia NYHA Classification IV ECG has been reviewed.    Pulmonary:   Denies COPD.  Denies Asthma. Shortness of breath  Denies Sleep Apnea.    Renal/:   Chronic Renal Disease, CRI    Hepatic/GI:   Denies GERD. GIB   Neurological:   Denies CVA.  Denies Headaches. Denies Seizures.    Endocrine:   Denies Diabetes. Hypothyroidism  Obesity / BMI > 30  Psych:   Denies Psychiatric History.          Physical Exam  General: Unconscious    Airway:  Pre-Existing Airway: Oral Endotracheal  tube    Chest/Lungs:  Clear to auscultation  Vent  Heart:ECMO      Anesthesia Plan  Type of Anesthesia, risks & benefits discussed:    Anesthesia Type: Gen ETT  Intra-op Monitoring Plan: Standard ASA Monitors, Art Line, Central Line, BRITTANY and PA  Post Op Pain Control Plan: multimodal analgesia and IV/PO Opioids PRN  Induction:  IV  Airway Plan: Direct and Video, Post-Induction  Informed Consent: Informed consent signed with the Patient representative and all parties understand the risks and agree with anesthesia plan.  All questions answered. Patient consented to blood products? Yes  ASA Score: 5  Day of Surgery Review of History & Physical: H&P Update referred to the surgeon/provider.    Ready For Surgery From Anesthesia Perspective.     .

## 2022-07-14 NOTE — PROGRESS NOTES
Therapy with vancomycin and consult discontinued by provider.  Pharmacy will sign off, please re-consult as needed.    Mike Olivares, PharmD  Ext: [phone]

## 2022-07-14 NOTE — TRANSFER OF CARE
"Anesthesia Transfer of Care Note    Patient: Tmi Richards    Procedure(s) Performed: Procedure(s) (LRB):  CLOSURE, WOUND, STERNUM (N/A)    Patient location: ICU    Transport from OR: Continuous ECG monitoring in transport. Continuous SpO2 monitoring in transport. Continuos invasive BP monitoring in transport. Transported from OR intubated on 100% O2 by AMBU with adequate controlled ventilation    Post pain: adequate analgesia    Post assessment: no apparent anesthetic complications and tolerated procedure well    Post vital signs: stable    Level of consciousness: sedated    Nausea/Vomiting: no nausea/vomiting    Complications: none    Transfer of care protocol was followed      Last vitals:   Visit Vitals  BP (!) 73/66   Pulse 92   Temp 36.4 °C (97.52 °F)   Resp 12   Ht 6' 0.99" (1.854 m)   Wt 126.1 kg (278 lb)   SpO2 99%   BMI 36.69 kg/m²     "

## 2022-07-14 NOTE — PROGRESS NOTES
Pharmacokinetic Assessment Follow Up: IV Vancomycin    Vancomycin Regimen Assessment/Plan:    - Vancomycin random level resulted as 16.7 mg/dl, drawn ~ 7 hours from the previous dose. Goal 10-15 mg/dl.   - Patient with worsening WAGNER.  Discontinue scheduled regimen and start pulse dosing.   - A random level is ordered tonight at 18:00 to assess clearance.  Pharmacy to re-dose vancomycin if level < 15 mg/dl.     Drug levels (last 3 results):  Recent Labs   Lab Result Units 07/14/22  0715   Vancomycin, Random ug/mL 16.7       Pharmacy will continue to follow and monitor vancomycin.    Please contact pharmacy at extension [phone] for questions regarding this assessment.    Thank you for the consult,   Cecy Lopez, PharmD, BCCCP       Patient brief summary:  Tim Richards is a 56 y.o. male initiated on antimicrobial therapy with IV Vancomycin for surgical prophylaxis.     The patient's current regimen is pulse dosing.    Drug Allergies:   Review of patient's allergies indicates:   Allergen Reactions    Lisinopril Anaphylaxis    Hydralazine analogues      Chronic constipation, impotence, dizziness       Actual Body Weight:   126.1 kg    Renal Function:   Estimated Creatinine Clearance: 46.5 mL/min (A) (based on SCr of 2.5 mg/dL (H)).,     Dialysis Method (if applicable):  N/A    CBC (last 72 hours):  Recent Labs   Lab Result Units 07/12/22  0743 07/13/22  2149 07/14/22  0102 07/14/22  0405 07/14/22  0605 07/14/22  0752   WBC K/uL 5.02 12.62 11.83 12.59 13.44* 13.86*   Hemoglobin g/dL 8.4* 8.8* 9.4* 9.4* 8.8* 9.6*   Hematocrit % 27.7* 26.5* 28.8* 28.4* 26.4* 28.5*   Platelets K/uL 210 52* 133* 126* 106* 117*   Gran % % 68.4 84.2* 71.0 88.6* 87.7* 87.4*   Lymph % % 14.1* 2.5* 6.0* 3.3* 2.9* 3.0*   Mono % % 16.3* 12.6 7.0 7.8 9.0 9.1   Eosinophil % % 0.6 0.0 0.0 0.0 0.0 0.0   Basophil % % 0.2 0.2 0.0 0.1 0.1 0.1   Differential Method  Automated Automated Manual Automated Automated Automated       Metabolic Panel  (last 72 hours):  Recent Labs   Lab Result Units 07/11/22  1816 07/12/22  0325 07/12/22  1904 07/13/22  0514 07/13/22  2149 07/14/22  0102 07/14/22  0405 07/14/22  0752   Sodium mmol/L 136 134* 132* 130* 147* 149* 147* 147*   Potassium mmol/L 4.1 4.0 4.0 3.7 3.2*  3.2*  3.2* 3.6 3.7 4.0   Chloride mmol/L 101 101 101 101 105 109 108 109   CO2 mmol/L 25 23 26 22* 18* 20* 23 27   Glucose mg/dL 91 80 93 78 201* 260* 244* 186*   BUN mg/dL 19 18 18 17 17 19 20 21*   Creatinine mg/dL 1.8* 1.5* 1.7* 1.5* 2.1* 2.2* 2.4* 2.5*   Albumin g/dL  --   --   --   --  2.3* 2.4* 2.4* 2.4*   Total Bilirubin mg/dL  --   --   --   --  2.6* 2.5* 2.9* 3.0*   Alkaline Phosphatase U/L  --   --   --   --  39* 39* 42* 41*   AST U/L  --   --   --   --  119* 153* 221* 250*   ALT U/L  --   --   --   --  63* 79* 109* 124*   Magnesium mg/dL 1.9 2.2 2.2 2.1 2.6  2.6 2.3 2.4 2.3   Phosphorus mg/dL  --   --   --   --  2.4*  2.4* 2.7 3.0 2.9       Vancomycin Administrations:  vancomycin given in the last 96 hours                   vancomycin in dextrose 5 % 1 gram/250 mL IVPB 1,000 mg (mg) 1,000 mg New Bag 07/14/22 0012    vancomycin 1.25 g in dextrose 5% 250 mL IVPB (ready to mix) (mg) 1,000 mg Given 07/13/22 0906                Microbiologic Results:  Microbiology Results (last 7 days)     ** No results found for the last 168 hours. **

## 2022-07-15 ENCOUNTER — DOCUMENTATION ONLY (OUTPATIENT)
Dept: ELECTROPHYSIOLOGY | Facility: CLINIC | Age: 57
End: 2022-07-15
Payer: MEDICARE

## 2022-07-15 ENCOUNTER — ANESTHESIA (OUTPATIENT)
Dept: SURGERY | Facility: HOSPITAL | Age: 57
DRG: 001 | End: 2022-07-15
Payer: COMMERCIAL

## 2022-07-15 PROBLEM — Z87.891 PERSONAL HISTORY OF NICOTINE DEPENDENCE: Status: ACTIVE | Noted: 2020-07-01

## 2022-07-15 PROBLEM — N17.9 AKI (ACUTE KIDNEY INJURY): Status: ACTIVE | Noted: 2022-07-15

## 2022-07-15 LAB
ALBUMIN SERPL BCP-MCNC: 2 G/DL (ref 3.5–5.2)
ALBUMIN SERPL BCP-MCNC: 2 G/DL (ref 3.5–5.2)
ALBUMIN SERPL BCP-MCNC: 2.2 G/DL (ref 3.5–5.2)
ALLENS TEST: ABNORMAL
ALLENS TEST: ABNORMAL
ALP SERPL-CCNC: 50 U/L (ref 55–135)
ALP SERPL-CCNC: 56 U/L (ref 55–135)
ALP SERPL-CCNC: 62 U/L (ref 55–135)
ALP SERPL-CCNC: 64 U/L (ref 55–135)
ALP SERPL-CCNC: 66 U/L (ref 55–135)
ALT SERPL W/O P-5'-P-CCNC: 139 U/L (ref 10–44)
ALT SERPL W/O P-5'-P-CCNC: 156 U/L (ref 10–44)
ALT SERPL W/O P-5'-P-CCNC: 159 U/L (ref 10–44)
ALT SERPL W/O P-5'-P-CCNC: 162 U/L (ref 10–44)
ALT SERPL W/O P-5'-P-CCNC: 164 U/L (ref 10–44)
ANION GAP SERPL CALC-SCNC: 10 MMOL/L (ref 8–16)
ANION GAP SERPL CALC-SCNC: 10 MMOL/L (ref 8–16)
ANION GAP SERPL CALC-SCNC: 11 MMOL/L (ref 8–16)
ANION GAP SERPL CALC-SCNC: 12 MMOL/L (ref 8–16)
ANION GAP SERPL CALC-SCNC: 14 MMOL/L (ref 8–16)
ANISOCYTOSIS BLD QL SMEAR: SLIGHT
APTT BLDCRRT: 32.1 SEC (ref 21–32)
APTT BLDCRRT: 32.6 SEC (ref 21–32)
APTT BLDCRRT: 33 SEC (ref 21–32)
APTT BLDCRRT: 33.7 SEC (ref 21–32)
APTT BLDCRRT: 33.9 SEC (ref 21–32)
AST SERPL-CCNC: 249 U/L (ref 10–40)
AST SERPL-CCNC: 272 U/L (ref 10–40)
AST SERPL-CCNC: 282 U/L (ref 10–40)
AST SERPL-CCNC: 283 U/L (ref 10–40)
AST SERPL-CCNC: 287 U/L (ref 10–40)
BASO STIPL BLD QL SMEAR: ABNORMAL
BASO STIPL BLD QL SMEAR: ABNORMAL
BASOPHILS # BLD AUTO: 0.01 K/UL (ref 0–0.2)
BASOPHILS # BLD AUTO: 0.03 K/UL (ref 0–0.2)
BASOPHILS # BLD AUTO: 0.04 K/UL (ref 0–0.2)
BASOPHILS # BLD AUTO: ABNORMAL K/UL (ref 0–0.2)
BASOPHILS # BLD AUTO: ABNORMAL K/UL (ref 0–0.2)
BASOPHILS NFR BLD: 0 % (ref 0–1.9)
BASOPHILS NFR BLD: 0.1 % (ref 0–1.9)
BASOPHILS NFR BLD: 0.2 % (ref 0–1.9)
BILIRUB DIRECT SERPL-MCNC: 4.5 MG/DL (ref 0.1–0.3)
BILIRUB SERPL-MCNC: 5.1 MG/DL (ref 0.1–1)
BILIRUB SERPL-MCNC: 5.6 MG/DL (ref 0.1–1)
BILIRUB SERPL-MCNC: 5.7 MG/DL (ref 0.1–1)
BILIRUB SERPL-MCNC: 6.4 MG/DL (ref 0.1–1)
BILIRUB SERPL-MCNC: 6.5 MG/DL (ref 0.1–1)
BNP SERPL-MCNC: 1106 PG/ML (ref 0–99)
BUN SERPL-MCNC: 30 MG/DL (ref 6–20)
BUN SERPL-MCNC: 31 MG/DL (ref 6–20)
BUN SERPL-MCNC: 39 MG/DL (ref 6–20)
BUN SERPL-MCNC: 39 MG/DL (ref 6–20)
BUN SERPL-MCNC: 45 MG/DL (ref 6–20)
BURR CELLS BLD QL SMEAR: ABNORMAL
CA-I BLDV-SCNC: 1.05 MMOL/L (ref 1.06–1.42)
CALCIUM SERPL-MCNC: 7.9 MG/DL (ref 8.7–10.5)
CALCIUM SERPL-MCNC: 8.1 MG/DL (ref 8.7–10.5)
CALCIUM SERPL-MCNC: 8.1 MG/DL (ref 8.7–10.5)
CALCIUM SERPL-MCNC: 8.4 MG/DL (ref 8.7–10.5)
CALCIUM SERPL-MCNC: 8.7 MG/DL (ref 8.7–10.5)
CHLORIDE SERPL-SCNC: 103 MMOL/L (ref 95–110)
CHLORIDE SERPL-SCNC: 104 MMOL/L (ref 95–110)
CHLORIDE SERPL-SCNC: 106 MMOL/L (ref 95–110)
CO2 SERPL-SCNC: 24 MMOL/L (ref 23–29)
CO2 SERPL-SCNC: 26 MMOL/L (ref 23–29)
CO2 SERPL-SCNC: 27 MMOL/L (ref 23–29)
CO2 SERPL-SCNC: 28 MMOL/L (ref 23–29)
CO2 SERPL-SCNC: 29 MMOL/L (ref 23–29)
CREAT SERPL-MCNC: 2.9 MG/DL (ref 0.5–1.4)
CREAT SERPL-MCNC: 3.5 MG/DL (ref 0.5–1.4)
CREAT SERPL-MCNC: 3.6 MG/DL (ref 0.5–1.4)
CREAT SERPL-MCNC: 3.6 MG/DL (ref 0.5–1.4)
CREAT SERPL-MCNC: 3.7 MG/DL (ref 0.5–1.4)
CRP SERPL-MCNC: 265.4 MG/L (ref 0–8.2)
DELSYS: ABNORMAL
DIFFERENTIAL METHOD: ABNORMAL
DOHLE BOD BLD QL SMEAR: PRESENT
DOHLE BOD BLD QL SMEAR: PRESENT
EOSINOPHIL # BLD AUTO: 0 K/UL (ref 0–0.5)
EOSINOPHIL # BLD AUTO: ABNORMAL K/UL (ref 0–0.5)
EOSINOPHIL # BLD AUTO: ABNORMAL K/UL (ref 0–0.5)
EOSINOPHIL NFR BLD: 0 % (ref 0–8)
EOSINOPHIL NFR BLD: 0.1 % (ref 0–8)
ERYTHROCYTE [DISTWIDTH] IN BLOOD BY AUTOMATED COUNT: 15.6 % (ref 11.5–14.5)
ERYTHROCYTE [DISTWIDTH] IN BLOOD BY AUTOMATED COUNT: 15.6 % (ref 11.5–14.5)
ERYTHROCYTE [DISTWIDTH] IN BLOOD BY AUTOMATED COUNT: 15.7 % (ref 11.5–14.5)
ERYTHROCYTE [DISTWIDTH] IN BLOOD BY AUTOMATED COUNT: 15.8 % (ref 11.5–14.5)
ERYTHROCYTE [DISTWIDTH] IN BLOOD BY AUTOMATED COUNT: 15.9 % (ref 11.5–14.5)
ERYTHROCYTE [SEDIMENTATION RATE] IN BLOOD BY WESTERGREN METHOD: 14 MM/H
EST. GFR  (AFRICAN AMERICAN): 20.1 ML/MIN/1.73 M^2
EST. GFR  (AFRICAN AMERICAN): 20.7 ML/MIN/1.73 M^2
EST. GFR  (AFRICAN AMERICAN): 20.7 ML/MIN/1.73 M^2
EST. GFR  (AFRICAN AMERICAN): 21.4 ML/MIN/1.73 M^2
EST. GFR  (AFRICAN AMERICAN): 26.9 ML/MIN/1.73 M^2
EST. GFR  (NON AFRICAN AMERICAN): 17.3 ML/MIN/1.73 M^2
EST. GFR  (NON AFRICAN AMERICAN): 17.9 ML/MIN/1.73 M^2
EST. GFR  (NON AFRICAN AMERICAN): 17.9 ML/MIN/1.73 M^2
EST. GFR  (NON AFRICAN AMERICAN): 18.5 ML/MIN/1.73 M^2
EST. GFR  (NON AFRICAN AMERICAN): 23.3 ML/MIN/1.73 M^2
FIBRINOGEN PPP-MCNC: 425 MG/DL (ref 182–400)
FIBRINOGEN PPP-MCNC: 452 MG/DL (ref 182–400)
FIBRINOGEN PPP-MCNC: 485 MG/DL (ref 182–400)
FIBRINOGEN PPP-MCNC: 523 MG/DL (ref 182–400)
FIO2: 100
FIO2: 60
GLUCOSE SERPL-MCNC: 108 MG/DL (ref 70–110)
GLUCOSE SERPL-MCNC: 113 MG/DL (ref 70–110)
GLUCOSE SERPL-MCNC: 147 MG/DL (ref 70–110)
GLUCOSE SERPL-MCNC: 159 MG/DL (ref 70–110)
GLUCOSE SERPL-MCNC: 166 MG/DL (ref 70–110)
GLUCOSE SERPL-MCNC: 179 MG/DL (ref 70–110)
GLUCOSE SERPL-MCNC: 179 MG/DL (ref 70–110)
HCO3 UR-SCNC: 23 MMOL/L (ref 24–28)
HCO3 UR-SCNC: 25.5 MMOL/L (ref 24–28)
HCO3 UR-SCNC: 28.8 MMOL/L (ref 24–28)
HCO3 UR-SCNC: 28.8 MMOL/L (ref 24–28)
HCO3 UR-SCNC: 28.9 MMOL/L (ref 24–28)
HCO3 UR-SCNC: 29.5 MMOL/L (ref 24–28)
HCO3 UR-SCNC: 30.2 MMOL/L (ref 24–28)
HCO3 UR-SCNC: 30.2 MMOL/L (ref 24–28)
HCO3 UR-SCNC: 30.3 MMOL/L (ref 24–28)
HCO3 UR-SCNC: 30.5 MMOL/L (ref 24–28)
HCO3 UR-SCNC: 31 MMOL/L (ref 24–28)
HCO3 UR-SCNC: 31 MMOL/L (ref 24–28)
HCO3 UR-SCNC: 31.3 MMOL/L (ref 24–28)
HCO3 UR-SCNC: 31.9 MMOL/L (ref 24–28)
HCO3 UR-SCNC: 33.1 MMOL/L (ref 24–28)
HCT VFR BLD AUTO: 24.7 % (ref 40–54)
HCT VFR BLD AUTO: 25.4 % (ref 40–54)
HCT VFR BLD AUTO: 25.7 % (ref 40–54)
HCT VFR BLD AUTO: 25.7 % (ref 40–54)
HCT VFR BLD AUTO: 26.6 % (ref 40–54)
HCT VFR BLD CALC: 21 %PCV (ref 36–54)
HCT VFR BLD CALC: 21 %PCV (ref 36–54)
HCT VFR BLD CALC: 24 %PCV (ref 36–54)
HCT VFR BLD CALC: 24 %PCV (ref 36–54)
HCT VFR BLD CALC: 25 %PCV (ref 36–54)
HCT VFR BLD CALC: 25 %PCV (ref 36–54)
HCT VFR BLD CALC: 26 %PCV (ref 36–54)
HCT VFR BLD CALC: 26 %PCV (ref 36–54)
HCT VFR BLD CALC: 27 %PCV (ref 36–54)
HGB BLD-MCNC: 8.4 G/DL (ref 14–18)
HGB BLD-MCNC: 8.6 G/DL (ref 14–18)
HGB BLD-MCNC: 8.7 G/DL (ref 14–18)
HGB BLD-MCNC: 8.7 G/DL (ref 14–18)
HGB BLD-MCNC: 8.9 G/DL (ref 14–18)
HYPOCHROMIA BLD QL SMEAR: ABNORMAL
IMM GRANULOCYTES # BLD AUTO: 0.35 K/UL (ref 0–0.04)
IMM GRANULOCYTES # BLD AUTO: 0.4 K/UL (ref 0–0.04)
IMM GRANULOCYTES # BLD AUTO: 0.41 K/UL (ref 0–0.04)
IMM GRANULOCYTES # BLD AUTO: ABNORMAL K/UL (ref 0–0.04)
IMM GRANULOCYTES # BLD AUTO: ABNORMAL K/UL (ref 0–0.04)
IMM GRANULOCYTES NFR BLD AUTO: 1.5 % (ref 0–0.5)
IMM GRANULOCYTES NFR BLD AUTO: 1.6 % (ref 0–0.5)
IMM GRANULOCYTES NFR BLD AUTO: 1.7 % (ref 0–0.5)
IMM GRANULOCYTES NFR BLD AUTO: ABNORMAL % (ref 0–0.5)
IMM GRANULOCYTES NFR BLD AUTO: ABNORMAL % (ref 0–0.5)
INR PPP: 1.3 (ref 0.8–1.2)
INR PPP: 1.4 (ref 0.8–1.2)
INR PPP: 1.5 (ref 0.8–1.2)
LDH SERPL L TO P-CCNC: 1.49 MMOL/L (ref 0.36–1.25)
LDH SERPL L TO P-CCNC: 1.6 MMOL/L (ref 0.36–1.25)
LDH SERPL L TO P-CCNC: 1.89 MMOL/L (ref 0.36–1.25)
LDH SERPL L TO P-CCNC: 2 MMOL/L (ref 0.36–1.25)
LDH SERPL L TO P-CCNC: 2.13 MMOL/L (ref 0.36–1.25)
LDH SERPL L TO P-CCNC: 2.52 MMOL/L (ref 0.36–1.25)
LDH SERPL L TO P-CCNC: 3 MMOL/L (ref 0.36–1.25)
LYMPHOCYTES # BLD AUTO: 0.5 K/UL (ref 1–4.8)
LYMPHOCYTES # BLD AUTO: 0.6 K/UL (ref 1–4.8)
LYMPHOCYTES # BLD AUTO: 0.8 K/UL (ref 1–4.8)
LYMPHOCYTES # BLD AUTO: ABNORMAL K/UL (ref 1–4.8)
LYMPHOCYTES # BLD AUTO: ABNORMAL K/UL (ref 1–4.8)
LYMPHOCYTES NFR BLD: 1.9 % (ref 18–48)
LYMPHOCYTES NFR BLD: 2 % (ref 18–48)
LYMPHOCYTES NFR BLD: 2.2 % (ref 18–48)
LYMPHOCYTES NFR BLD: 3 % (ref 18–48)
LYMPHOCYTES NFR BLD: 3.3 % (ref 18–48)
MAGNESIUM SERPL-MCNC: 2.4 MG/DL (ref 1.6–2.6)
MAGNESIUM SERPL-MCNC: 2.4 MG/DL (ref 1.6–2.6)
MAGNESIUM SERPL-MCNC: 2.6 MG/DL (ref 1.6–2.6)
MCH RBC QN AUTO: 29.2 PG (ref 27–31)
MCH RBC QN AUTO: 29.3 PG (ref 27–31)
MCH RBC QN AUTO: 29.3 PG (ref 27–31)
MCH RBC QN AUTO: 29.6 PG (ref 27–31)
MCH RBC QN AUTO: 29.7 PG (ref 27–31)
MCHC RBC AUTO-ENTMCNC: 33.5 G/DL (ref 32–36)
MCHC RBC AUTO-ENTMCNC: 33.5 G/DL (ref 32–36)
MCHC RBC AUTO-ENTMCNC: 33.9 G/DL (ref 32–36)
MCHC RBC AUTO-ENTMCNC: 34 G/DL (ref 32–36)
MCHC RBC AUTO-ENTMCNC: 34.3 G/DL (ref 32–36)
MCV RBC AUTO: 86 FL (ref 82–98)
MCV RBC AUTO: 88 FL (ref 82–98)
MCV RBC AUTO: 89 FL (ref 82–98)
METAMYELOCYTES NFR BLD MANUAL: 1 %
METAMYELOCYTES NFR BLD MANUAL: 1 %
MODE: ABNORMAL
MONOCYTES # BLD AUTO: 1.4 K/UL (ref 0.3–1)
MONOCYTES # BLD AUTO: 2 K/UL (ref 0.3–1)
MONOCYTES # BLD AUTO: 2.1 K/UL (ref 0.3–1)
MONOCYTES # BLD AUTO: ABNORMAL K/UL (ref 0.3–1)
MONOCYTES # BLD AUTO: ABNORMAL K/UL (ref 0.3–1)
MONOCYTES NFR BLD: 4 % (ref 4–15)
MONOCYTES NFR BLD: 5.8 % (ref 4–15)
MONOCYTES NFR BLD: 8.3 % (ref 4–15)
MONOCYTES NFR BLD: 8.3 % (ref 4–15)
MONOCYTES NFR BLD: 9 % (ref 4–15)
NEUTROPHILS # BLD AUTO: 20.8 K/UL (ref 1.8–7.7)
NEUTROPHILS # BLD AUTO: 21.4 K/UL (ref 1.8–7.7)
NEUTROPHILS # BLD AUTO: 22.2 K/UL (ref 1.8–7.7)
NEUTROPHILS NFR BLD: 72 % (ref 38–73)
NEUTROPHILS NFR BLD: 74 % (ref 38–73)
NEUTROPHILS NFR BLD: 87.7 % (ref 38–73)
NEUTROPHILS NFR BLD: 87.9 % (ref 38–73)
NEUTROPHILS NFR BLD: 89.4 % (ref 38–73)
NEUTS BAND NFR BLD MANUAL: 16 %
NEUTS BAND NFR BLD MANUAL: 18 %
NRBC BLD-RTO: 0 /100 WBC
OVALOCYTES BLD QL SMEAR: ABNORMAL
PCO2 BLDA: 37.6 MMHG (ref 35–45)
PCO2 BLDA: 39.3 MMHG (ref 35–45)
PCO2 BLDA: 44.6 MMHG (ref 35–45)
PCO2 BLDA: 44.9 MMHG (ref 35–45)
PCO2 BLDA: 45.5 MMHG (ref 35–45)
PCO2 BLDA: 46.1 MMHG (ref 35–45)
PCO2 BLDA: 46.3 MMHG (ref 35–45)
PCO2 BLDA: 47.2 MMHG (ref 35–45)
PCO2 BLDA: 47.2 MMHG (ref 35–45)
PCO2 BLDA: 47.6 MMHG (ref 35–45)
PCO2 BLDA: 47.9 MMHG (ref 35–45)
PCO2 BLDA: 48 MMHG (ref 35–45)
PCO2 BLDA: 48.1 MMHG (ref 35–45)
PCO2 BLDA: 48.2 MMHG (ref 35–45)
PCO2 BLDA: 48.3 MMHG (ref 35–45)
PEEP: 7
PH SMN: 7.38 [PH] (ref 7.35–7.45)
PH SMN: 7.39 [PH] (ref 7.35–7.45)
PH SMN: 7.39 [PH] (ref 7.35–7.45)
PH SMN: 7.4 [PH] (ref 7.35–7.45)
PH SMN: 7.41 [PH] (ref 7.35–7.45)
PH SMN: 7.41 [PH] (ref 7.35–7.45)
PH SMN: 7.42 [PH] (ref 7.35–7.45)
PH SMN: 7.43 [PH] (ref 7.35–7.45)
PH SMN: 7.46 [PH] (ref 7.35–7.45)
PH SMN: 7.47 [PH] (ref 7.35–7.45)
PHOSPHATE SERPL-MCNC: 5.6 MG/DL (ref 2.7–4.5)
PHOSPHATE SERPL-MCNC: 6.1 MG/DL (ref 2.7–4.5)
PHOSPHATE SERPL-MCNC: 6.6 MG/DL (ref 2.7–4.5)
PHOSPHATE SERPL-MCNC: 6.8 MG/DL (ref 2.7–4.5)
PLATELET # BLD AUTO: 75 K/UL (ref 150–450)
PLATELET # BLD AUTO: 80 K/UL (ref 150–450)
PLATELET # BLD AUTO: 81 K/UL (ref 150–450)
PLATELET # BLD AUTO: 81 K/UL (ref 150–450)
PLATELET # BLD AUTO: 93 K/UL (ref 150–450)
PLATELET BLD QL SMEAR: ABNORMAL
PMV BLD AUTO: 11.5 FL (ref 9.2–12.9)
PMV BLD AUTO: 11.6 FL (ref 9.2–12.9)
PMV BLD AUTO: 11.7 FL (ref 9.2–12.9)
PMV BLD AUTO: 11.7 FL (ref 9.2–12.9)
PMV BLD AUTO: 11.8 FL (ref 9.2–12.9)
PO2 BLDA: 103 MMHG (ref 80–100)
PO2 BLDA: 105 MMHG (ref 80–100)
PO2 BLDA: 109 MMHG (ref 80–100)
PO2 BLDA: 111 MMHG (ref 80–100)
PO2 BLDA: 113 MMHG (ref 80–100)
PO2 BLDA: 118 MMHG (ref 80–100)
PO2 BLDA: 120 MMHG (ref 80–100)
PO2 BLDA: 126 MMHG (ref 80–100)
PO2 BLDA: 167 MMHG (ref 80–100)
PO2 BLDA: 40 MMHG (ref 40–60)
PO2 BLDA: 42 MMHG (ref 40–60)
PO2 BLDA: 48 MMHG (ref 40–60)
PO2 BLDA: 501 MMHG (ref 80–100)
PO2 BLDA: 51 MMHG (ref 40–60)
PO2 BLDA: 517 MMHG (ref 80–100)
POC BE: -2 MMOL/L
POC BE: 1 MMOL/L
POC BE: 4 MMOL/L
POC BE: 5 MMOL/L
POC BE: 6 MMOL/L
POC BE: 7 MMOL/L
POC BE: 8 MMOL/L
POC BE: 9 MMOL/L
POC IONIZED CALCIUM: 1.04 MMOL/L (ref 1.06–1.42)
POC IONIZED CALCIUM: 1.04 MMOL/L (ref 1.06–1.42)
POC IONIZED CALCIUM: 1.07 MMOL/L (ref 1.06–1.42)
POC IONIZED CALCIUM: 1.07 MMOL/L (ref 1.06–1.42)
POC IONIZED CALCIUM: 1.08 MMOL/L (ref 1.06–1.42)
POC IONIZED CALCIUM: 1.1 MMOL/L (ref 1.06–1.42)
POC IONIZED CALCIUM: 1.13 MMOL/L (ref 1.06–1.42)
POC IONIZED CALCIUM: 1.15 MMOL/L (ref 1.06–1.42)
POC IONIZED CALCIUM: 1.19 MMOL/L (ref 1.06–1.42)
POC SATURATED O2: 100 % (ref 95–100)
POC SATURATED O2: 74 % (ref 95–100)
POC SATURATED O2: 77 % (ref 95–100)
POC SATURATED O2: 83 % (ref 95–100)
POC SATURATED O2: 85 % (ref 95–100)
POC SATURATED O2: 98 % (ref 95–100)
POC SATURATED O2: 99 % (ref 95–100)
POC TCO2: 24 MMOL/L (ref 23–27)
POC TCO2: 27 MMOL/L (ref 23–27)
POC TCO2: 30 MMOL/L (ref 23–27)
POC TCO2: 30 MMOL/L (ref 24–29)
POC TCO2: 30 MMOL/L (ref 24–29)
POC TCO2: 31 MMOL/L (ref 23–27)
POC TCO2: 32 MMOL/L (ref 23–27)
POC TCO2: 32 MMOL/L (ref 24–29)
POC TCO2: 32 MMOL/L (ref 24–29)
POC TCO2: 33 MMOL/L (ref 23–27)
POC TCO2: 33 MMOL/L (ref 23–27)
POC TCO2: 34 MMOL/L (ref 23–27)
POCT GLUCOSE: 115 MG/DL (ref 70–110)
POCT GLUCOSE: 131 MG/DL (ref 70–110)
POCT GLUCOSE: 132 MG/DL (ref 70–110)
POCT GLUCOSE: 133 MG/DL (ref 70–110)
POCT GLUCOSE: 138 MG/DL (ref 70–110)
POCT GLUCOSE: 138 MG/DL (ref 70–110)
POCT GLUCOSE: 142 MG/DL (ref 70–110)
POCT GLUCOSE: 145 MG/DL (ref 70–110)
POCT GLUCOSE: 151 MG/DL (ref 70–110)
POCT GLUCOSE: 151 MG/DL (ref 70–110)
POCT GLUCOSE: 153 MG/DL (ref 70–110)
POCT GLUCOSE: 153 MG/DL (ref 70–110)
POCT GLUCOSE: 155 MG/DL (ref 70–110)
POCT GLUCOSE: 157 MG/DL (ref 70–110)
POCT GLUCOSE: 160 MG/DL (ref 70–110)
POCT GLUCOSE: 169 MG/DL (ref 70–110)
POCT GLUCOSE: 170 MG/DL (ref 70–110)
POCT GLUCOSE: 171 MG/DL (ref 70–110)
POCT GLUCOSE: 174 MG/DL (ref 70–110)
POCT GLUCOSE: 189 MG/DL (ref 70–110)
POCT GLUCOSE: 204 MG/DL (ref 70–110)
POIKILOCYTOSIS BLD QL SMEAR: SLIGHT
POLYCHROMASIA BLD QL SMEAR: ABNORMAL
POTASSIUM BLD-SCNC: 3.3 MMOL/L (ref 3.5–5.1)
POTASSIUM BLD-SCNC: 3.3 MMOL/L (ref 3.5–5.1)
POTASSIUM BLD-SCNC: 4.2 MMOL/L (ref 3.5–5.1)
POTASSIUM BLD-SCNC: 4.4 MMOL/L (ref 3.5–5.1)
POTASSIUM BLD-SCNC: 4.5 MMOL/L (ref 3.5–5.1)
POTASSIUM BLD-SCNC: 4.7 MMOL/L (ref 3.5–5.1)
POTASSIUM BLD-SCNC: 4.7 MMOL/L (ref 3.5–5.1)
POTASSIUM BLD-SCNC: 4.9 MMOL/L (ref 3.5–5.1)
POTASSIUM BLD-SCNC: 4.9 MMOL/L (ref 3.5–5.1)
POTASSIUM SERPL-SCNC: 4.5 MMOL/L (ref 3.5–5.1)
POTASSIUM SERPL-SCNC: 4.7 MMOL/L (ref 3.5–5.1)
POTASSIUM SERPL-SCNC: 4.8 MMOL/L (ref 3.5–5.1)
POTASSIUM SERPL-SCNC: 4.9 MMOL/L (ref 3.5–5.1)
POTASSIUM SERPL-SCNC: 4.9 MMOL/L (ref 3.5–5.1)
PREALB SERPL-MCNC: 10 MG/DL (ref 20–43)
PROT SERPL-MCNC: 4 G/DL (ref 6–8.4)
PROT SERPL-MCNC: 4.1 G/DL (ref 6–8.4)
PROT SERPL-MCNC: 4.3 G/DL (ref 6–8.4)
PROT SERPL-MCNC: 4.4 G/DL (ref 6–8.4)
PROT SERPL-MCNC: 4.5 G/DL (ref 6–8.4)
PROTHROMBIN TIME: 13.5 SEC (ref 9–12.5)
PROTHROMBIN TIME: 14.4 SEC (ref 9–12.5)
PROTHROMBIN TIME: 14.6 SEC (ref 9–12.5)
PROTHROMBIN TIME: 14.6 SEC (ref 9–12.5)
PROTHROMBIN TIME: 14.8 SEC (ref 9–12.5)
RBC # BLD AUTO: 2.87 M/UL (ref 4.6–6.2)
RBC # BLD AUTO: 2.9 M/UL (ref 4.6–6.2)
RBC # BLD AUTO: 2.97 M/UL (ref 4.6–6.2)
RBC # BLD AUTO: 2.98 M/UL (ref 4.6–6.2)
RBC # BLD AUTO: 3.01 M/UL (ref 4.6–6.2)
SAMPLE: ABNORMAL
SCHISTOCYTES BLD QL SMEAR: ABNORMAL
SCHISTOCYTES BLD QL SMEAR: ABNORMAL
SITE: ABNORMAL
SITE: ABNORMAL
SODIUM BLD-SCNC: 141 MMOL/L (ref 136–145)
SODIUM BLD-SCNC: 141 MMOL/L (ref 136–145)
SODIUM BLD-SCNC: 142 MMOL/L (ref 136–145)
SODIUM BLD-SCNC: 142 MMOL/L (ref 136–145)
SODIUM BLD-SCNC: 143 MMOL/L (ref 136–145)
SODIUM BLD-SCNC: 143 MMOL/L (ref 136–145)
SODIUM BLD-SCNC: 145 MMOL/L (ref 136–145)
SODIUM BLD-SCNC: 149 MMOL/L (ref 136–145)
SODIUM BLD-SCNC: 150 MMOL/L (ref 136–145)
SODIUM SERPL-SCNC: 140 MMOL/L (ref 136–145)
SODIUM SERPL-SCNC: 142 MMOL/L (ref 136–145)
SODIUM SERPL-SCNC: 142 MMOL/L (ref 136–145)
SODIUM SERPL-SCNC: 143 MMOL/L (ref 136–145)
SODIUM SERPL-SCNC: 143 MMOL/L (ref 136–145)
TARGETS BLD QL SMEAR: ABNORMAL
VT: 400
WBC # BLD AUTO: 18.57 K/UL (ref 3.9–12.7)
WBC # BLD AUTO: 22.61 K/UL (ref 3.9–12.7)
WBC # BLD AUTO: 23.31 K/UL (ref 3.9–12.7)
WBC # BLD AUTO: 24.45 K/UL (ref 3.9–12.7)
WBC # BLD AUTO: 25.18 K/UL (ref 3.9–12.7)
WBC TOXIC VACUOLES BLD QL SMEAR: PRESENT
WBC TOXIC VACUOLES BLD QL SMEAR: PRESENT

## 2022-07-15 PROCEDURE — 94003 VENT MGMT INPAT SUBQ DAY: CPT

## 2022-07-15 PROCEDURE — 84134 ASSAY OF PREALBUMIN: CPT | Performed by: STUDENT IN AN ORGANIZED HEALTH CARE EDUCATION/TRAINING PROGRAM

## 2022-07-15 PROCEDURE — 63600175 PHARM REV CODE 636 W HCPCS

## 2022-07-15 PROCEDURE — 80076 HEPATIC FUNCTION PANEL: CPT | Performed by: STUDENT IN AN ORGANIZED HEALTH CARE EDUCATION/TRAINING PROGRAM

## 2022-07-15 PROCEDURE — 85384 FIBRINOGEN ACTIVITY: CPT | Mod: 91 | Performed by: THORACIC SURGERY (CARDIOTHORACIC VASCULAR SURGERY)

## 2022-07-15 PROCEDURE — D9220A PRA ANESTHESIA: Mod: ,,, | Performed by: ANESTHESIOLOGY

## 2022-07-15 PROCEDURE — 99900035 HC TECH TIME PER 15 MIN (STAT)

## 2022-07-15 PROCEDURE — 85730 THROMBOPLASTIN TIME PARTIAL: CPT | Mod: 91 | Performed by: THORACIC SURGERY (CARDIOTHORACIC VASCULAR SURGERY)

## 2022-07-15 PROCEDURE — 85027 COMPLETE CBC AUTOMATED: CPT | Mod: 91 | Performed by: THORACIC SURGERY (CARDIOTHORACIC VASCULAR SURGERY)

## 2022-07-15 PROCEDURE — 25000003 PHARM REV CODE 250

## 2022-07-15 PROCEDURE — 83880 ASSAY OF NATRIURETIC PEPTIDE: CPT | Performed by: STUDENT IN AN ORGANIZED HEALTH CARE EDUCATION/TRAINING PROGRAM

## 2022-07-15 PROCEDURE — 99223 1ST HOSP IP/OBS HIGH 75: CPT | Mod: ,,, | Performed by: INTERNAL MEDICINE

## 2022-07-15 PROCEDURE — 25000003 PHARM REV CODE 250: Performed by: THORACIC SURGERY (CARDIOTHORACIC VASCULAR SURGERY)

## 2022-07-15 PROCEDURE — 63600367 HC NITRIC OXIDE PER HOUR

## 2022-07-15 PROCEDURE — 63600175 PHARM REV CODE 636 W HCPCS: Performed by: STUDENT IN AN ORGANIZED HEALTH CARE EDUCATION/TRAINING PROGRAM

## 2022-07-15 PROCEDURE — 94002 VENT MGMT INPAT INIT DAY: CPT

## 2022-07-15 PROCEDURE — 85384 FIBRINOGEN ACTIVITY: CPT | Mod: 91 | Performed by: STUDENT IN AN ORGANIZED HEALTH CARE EDUCATION/TRAINING PROGRAM

## 2022-07-15 PROCEDURE — 93325 DOPPLER ECHO COLOR FLOW MAPG: CPT | Mod: 26,,, | Performed by: ANESTHESIOLOGY

## 2022-07-15 PROCEDURE — 85027 COMPLETE CBC AUTOMATED: CPT | Performed by: STUDENT IN AN ORGANIZED HEALTH CARE EDUCATION/TRAINING PROGRAM

## 2022-07-15 PROCEDURE — 25000003 PHARM REV CODE 250: Performed by: STUDENT IN AN ORGANIZED HEALTH CARE EDUCATION/TRAINING PROGRAM

## 2022-07-15 PROCEDURE — 93312 ECHO TRANSESOPHAGEAL: CPT | Mod: 26,59,, | Performed by: ANESTHESIOLOGY

## 2022-07-15 PROCEDURE — 82330 ASSAY OF CALCIUM: CPT | Performed by: STUDENT IN AN ORGANIZED HEALTH CARE EDUCATION/TRAINING PROGRAM

## 2022-07-15 PROCEDURE — 37000008 HC ANESTHESIA 1ST 15 MINUTES: Performed by: THORACIC SURGERY (CARDIOTHORACIC VASCULAR SURGERY)

## 2022-07-15 PROCEDURE — 85730 THROMBOPLASTIN TIME PARTIAL: CPT | Performed by: STUDENT IN AN ORGANIZED HEALTH CARE EDUCATION/TRAINING PROGRAM

## 2022-07-15 PROCEDURE — 36000713 HC OR TIME LEV V EA ADD 15 MIN: Performed by: THORACIC SURGERY (CARDIOTHORACIC VASCULAR SURGERY)

## 2022-07-15 PROCEDURE — 27201423 OPTIME MED/SURG SUP & DEVICES STERILE SUPPLY: Performed by: THORACIC SURGERY (CARDIOTHORACIC VASCULAR SURGERY)

## 2022-07-15 PROCEDURE — 36592 COLLECT BLOOD FROM PICC: CPT

## 2022-07-15 PROCEDURE — 99291 CRITICAL CARE FIRST HOUR: CPT | Mod: ,,, | Performed by: ANESTHESIOLOGY

## 2022-07-15 PROCEDURE — 83735 ASSAY OF MAGNESIUM: CPT | Performed by: STUDENT IN AN ORGANIZED HEALTH CARE EDUCATION/TRAINING PROGRAM

## 2022-07-15 PROCEDURE — 80048 BASIC METABOLIC PNL TOTAL CA: CPT | Mod: 91,XB | Performed by: THORACIC SURGERY (CARDIOTHORACIC VASCULAR SURGERY)

## 2022-07-15 PROCEDURE — 84100 ASSAY OF PHOSPHORUS: CPT | Mod: 91 | Performed by: THORACIC SURGERY (CARDIOTHORACIC VASCULAR SURGERY)

## 2022-07-15 PROCEDURE — 83050 HGB METHEMOGLOBIN QUAN: CPT

## 2022-07-15 PROCEDURE — 27000248 HC VAD-ADDITIONAL DAY

## 2022-07-15 PROCEDURE — 85610 PROTHROMBIN TIME: CPT | Mod: 91 | Performed by: THORACIC SURGERY (CARDIOTHORACIC VASCULAR SURGERY)

## 2022-07-15 PROCEDURE — 84100 ASSAY OF PHOSPHORUS: CPT | Performed by: STUDENT IN AN ORGANIZED HEALTH CARE EDUCATION/TRAINING PROGRAM

## 2022-07-15 PROCEDURE — 21750 REPAIR OF STERNUM SEPARATION: CPT | Mod: 58,,, | Performed by: THORACIC SURGERY (CARDIOTHORACIC VASCULAR SURGERY)

## 2022-07-15 PROCEDURE — 99291 PR CRITICAL CARE, E/M 30-74 MINUTES: ICD-10-PCS | Mod: ,,, | Performed by: ANESTHESIOLOGY

## 2022-07-15 PROCEDURE — C1729 CATH, DRAINAGE: HCPCS | Performed by: THORACIC SURGERY (CARDIOTHORACIC VASCULAR SURGERY)

## 2022-07-15 PROCEDURE — 84132 ASSAY OF SERUM POTASSIUM: CPT

## 2022-07-15 PROCEDURE — 20000000 HC ICU ROOM

## 2022-07-15 PROCEDURE — 85610 PROTHROMBIN TIME: CPT | Performed by: STUDENT IN AN ORGANIZED HEALTH CARE EDUCATION/TRAINING PROGRAM

## 2022-07-15 PROCEDURE — 80053 COMPREHEN METABOLIC PANEL: CPT | Mod: 91 | Performed by: THORACIC SURGERY (CARDIOTHORACIC VASCULAR SURGERY)

## 2022-07-15 PROCEDURE — 80053 COMPREHEN METABOLIC PANEL: CPT | Performed by: STUDENT IN AN ORGANIZED HEALTH CARE EDUCATION/TRAINING PROGRAM

## 2022-07-15 PROCEDURE — 85014 HEMATOCRIT: CPT

## 2022-07-15 PROCEDURE — 36000712 HC OR TIME LEV V 1ST 15 MIN: Performed by: THORACIC SURGERY (CARDIOTHORACIC VASCULAR SURGERY)

## 2022-07-15 PROCEDURE — 86140 C-REACTIVE PROTEIN: CPT | Performed by: STUDENT IN AN ORGANIZED HEALTH CARE EDUCATION/TRAINING PROGRAM

## 2022-07-15 PROCEDURE — D9220A PRA ANESTHESIA: ICD-10-PCS | Mod: ,,, | Performed by: ANESTHESIOLOGY

## 2022-07-15 PROCEDURE — 37799 UNLISTED PX VASCULAR SURGERY: CPT

## 2022-07-15 PROCEDURE — 80053 COMPREHEN METABOLIC PANEL: CPT | Mod: 91 | Performed by: STUDENT IN AN ORGANIZED HEALTH CARE EDUCATION/TRAINING PROGRAM

## 2022-07-15 PROCEDURE — 94761 N-INVAS EAR/PLS OXIMETRY MLT: CPT

## 2022-07-15 PROCEDURE — 82803 BLOOD GASES ANY COMBINATION: CPT

## 2022-07-15 PROCEDURE — 21750 PR CLOSE MED STERNOTOMY SEP, W/WO DEBRIDE: ICD-10-PCS | Mod: 58,,, | Performed by: THORACIC SURGERY (CARDIOTHORACIC VASCULAR SURGERY)

## 2022-07-15 PROCEDURE — 85027 COMPLETE CBC AUTOMATED: CPT | Mod: 91 | Performed by: STUDENT IN AN ORGANIZED HEALTH CARE EDUCATION/TRAINING PROGRAM

## 2022-07-15 PROCEDURE — 93320 PR DOPPLER ECHO HEART,COMPLETE: ICD-10-PCS | Mod: 26,,, | Performed by: ANESTHESIOLOGY

## 2022-07-15 PROCEDURE — 85007 BL SMEAR W/DIFF WBC COUNT: CPT | Performed by: STUDENT IN AN ORGANIZED HEALTH CARE EDUCATION/TRAINING PROGRAM

## 2022-07-15 PROCEDURE — 99223 PR INITIAL HOSPITAL CARE,LEVL III: ICD-10-PCS | Mod: ,,, | Performed by: INTERNAL MEDICINE

## 2022-07-15 PROCEDURE — 93325 PR DOPPLER COLOR FLOW VELOCITY MAP: ICD-10-PCS | Mod: 26,,, | Performed by: ANESTHESIOLOGY

## 2022-07-15 PROCEDURE — 83735 ASSAY OF MAGNESIUM: CPT | Mod: 91 | Performed by: STUDENT IN AN ORGANIZED HEALTH CARE EDUCATION/TRAINING PROGRAM

## 2022-07-15 PROCEDURE — 37000009 HC ANESTHESIA EA ADD 15 MINS: Performed by: THORACIC SURGERY (CARDIOTHORACIC VASCULAR SURGERY)

## 2022-07-15 PROCEDURE — 85007 BL SMEAR W/DIFF WBC COUNT: CPT | Mod: 91 | Performed by: STUDENT IN AN ORGANIZED HEALTH CARE EDUCATION/TRAINING PROGRAM

## 2022-07-15 PROCEDURE — 27000221 HC OXYGEN, UP TO 24 HOURS

## 2022-07-15 PROCEDURE — 63600175 PHARM REV CODE 636 W HCPCS: Performed by: THORACIC SURGERY (CARDIOTHORACIC VASCULAR SURGERY)

## 2022-07-15 PROCEDURE — 84100 ASSAY OF PHOSPHORUS: CPT | Mod: 91 | Performed by: STUDENT IN AN ORGANIZED HEALTH CARE EDUCATION/TRAINING PROGRAM

## 2022-07-15 PROCEDURE — C1768 GRAFT, VASCULAR: HCPCS | Performed by: THORACIC SURGERY (CARDIOTHORACIC VASCULAR SURGERY)

## 2022-07-15 PROCEDURE — 93320 DOPPLER ECHO COMPLETE: CPT | Mod: 26,,, | Performed by: ANESTHESIOLOGY

## 2022-07-15 PROCEDURE — 85025 COMPLETE CBC W/AUTO DIFF WBC: CPT | Mod: 91 | Performed by: THORACIC SURGERY (CARDIOTHORACIC VASCULAR SURGERY)

## 2022-07-15 PROCEDURE — 83735 ASSAY OF MAGNESIUM: CPT | Mod: 91 | Performed by: THORACIC SURGERY (CARDIOTHORACIC VASCULAR SURGERY)

## 2022-07-15 PROCEDURE — 84295 ASSAY OF SERUM SODIUM: CPT

## 2022-07-15 PROCEDURE — 85610 PROTHROMBIN TIME: CPT | Mod: 91 | Performed by: STUDENT IN AN ORGANIZED HEALTH CARE EDUCATION/TRAINING PROGRAM

## 2022-07-15 PROCEDURE — 85007 BL SMEAR W/DIFF WBC COUNT: CPT | Mod: 91 | Performed by: THORACIC SURGERY (CARDIOTHORACIC VASCULAR SURGERY)

## 2022-07-15 PROCEDURE — 93312 PR ECHO HEART,TRANSESOPHAGEAL: ICD-10-PCS | Mod: 26,59,, | Performed by: ANESTHESIOLOGY

## 2022-07-15 PROCEDURE — 83605 ASSAY OF LACTIC ACID: CPT

## 2022-07-15 PROCEDURE — 33949 ECMO/ECLS DAILY MGMT ARTERY: CPT

## 2022-07-15 PROCEDURE — 85002 BLEEDING TIME TEST: CPT

## 2022-07-15 PROCEDURE — 85730 THROMBOPLASTIN TIME PARTIAL: CPT | Mod: 91 | Performed by: STUDENT IN AN ORGANIZED HEALTH CARE EDUCATION/TRAINING PROGRAM

## 2022-07-15 PROCEDURE — C9113 INJ PANTOPRAZOLE SODIUM, VIA: HCPCS

## 2022-07-15 PROCEDURE — 82330 ASSAY OF CALCIUM: CPT

## 2022-07-15 DEVICE — MEMBRANE PERICARDIAL 15X20CM: Type: IMPLANTABLE DEVICE | Site: CHEST | Status: FUNCTIONAL

## 2022-07-15 RX ORDER — NEOSTIGMINE METHYLSULFATE 0.5 MG/ML
INJECTION, SOLUTION INTRAVENOUS
Status: DISCONTINUED | OUTPATIENT
Start: 2022-07-15 | End: 2022-07-15

## 2022-07-15 RX ORDER — FAMOTIDINE 10 MG/ML
20 INJECTION INTRAVENOUS DAILY
Status: DISCONTINUED | OUTPATIENT
Start: 2022-07-15 | End: 2022-07-15

## 2022-07-15 RX ORDER — CALCIUM GLUCONATE 20 MG/ML
1 INJECTION, SOLUTION INTRAVENOUS ONCE
Status: COMPLETED | OUTPATIENT
Start: 2022-07-15 | End: 2022-07-15

## 2022-07-15 RX ORDER — PANTOPRAZOLE SODIUM 40 MG/10ML
40 INJECTION, POWDER, LYOPHILIZED, FOR SOLUTION INTRAVENOUS 2 TIMES DAILY
Status: DISCONTINUED | OUTPATIENT
Start: 2022-07-15 | End: 2022-08-10

## 2022-07-15 RX ORDER — DEXMEDETOMIDINE HYDROCHLORIDE 4 UG/ML
0-1.4 INJECTION, SOLUTION INTRAVENOUS CONTINUOUS
Status: DISCONTINUED | OUTPATIENT
Start: 2022-07-15 | End: 2022-07-28

## 2022-07-15 RX ORDER — NOREPINEPHRINE BITARTRATE/D5W 8 MG/250ML
0-3 PLASTIC BAG, INJECTION (ML) INTRAVENOUS CONTINUOUS
Status: DISCONTINUED | OUTPATIENT
Start: 2022-07-15 | End: 2022-08-03

## 2022-07-15 RX ORDER — ROCURONIUM BROMIDE 10 MG/ML
INJECTION, SOLUTION INTRAVENOUS
Status: DISCONTINUED | OUTPATIENT
Start: 2022-07-15 | End: 2022-07-15

## 2022-07-15 RX ORDER — MIDAZOLAM HYDROCHLORIDE 1 MG/ML
INJECTION, SOLUTION INTRAMUSCULAR; INTRAVENOUS
Status: DISCONTINUED | OUTPATIENT
Start: 2022-07-15 | End: 2022-07-15

## 2022-07-15 RX ORDER — HEPARIN SODIUM 10000 [USP'U]/100ML
100 INJECTION, SOLUTION INTRAVENOUS CONTINUOUS
Status: DISCONTINUED | OUTPATIENT
Start: 2022-07-15 | End: 2022-07-16

## 2022-07-15 RX ORDER — CALCIUM GLUCONATE 20 MG/ML
1 INJECTION, SOLUTION INTRAVENOUS ONCE
Status: COMPLETED | OUTPATIENT
Start: 2022-07-15 | End: 2022-07-16

## 2022-07-15 RX ADMIN — PANTOPRAZOLE SODIUM 40 MG: 40 INJECTION, POWDER, FOR SOLUTION INTRAVENOUS at 09:07

## 2022-07-15 RX ADMIN — NOREPINEPHRINE BITARTRATE 0.04 MCG/KG/MIN: 4 INJECTION, SOLUTION INTRAVENOUS at 01:07

## 2022-07-15 RX ADMIN — VASOPRESSIN 0.06 UNITS/MIN: 20 INJECTION INTRAVENOUS at 11:07

## 2022-07-15 RX ADMIN — CALCIUM GLUCONATE 1 G: 20 INJECTION, SOLUTION INTRAVENOUS at 08:07

## 2022-07-15 RX ADMIN — CEFEPIME 2 G: 2 INJECTION, POWDER, FOR SOLUTION INTRAVENOUS at 10:07

## 2022-07-15 RX ADMIN — FUROSEMIDE 10 MG/HR: 10 INJECTION, SOLUTION INTRAMUSCULAR; INTRAVENOUS at 09:07

## 2022-07-15 RX ADMIN — DOPAMINE HYDROCHLORIDE IN DEXTROSE 3 MCG/KG/MIN: 1.6 INJECTION, SOLUTION INTRAVENOUS at 01:07

## 2022-07-15 RX ADMIN — VASOPRESSIN 0.04 UNITS/MIN: 20 INJECTION INTRAVENOUS at 04:07

## 2022-07-15 RX ADMIN — CALCIUM GLUCONATE 1 G: 20 INJECTION, SOLUTION INTRAVENOUS at 10:07

## 2022-07-15 RX ADMIN — GLYCOPYRROLATE 0.4 MG: 0.2 INJECTION, SOLUTION INTRAMUSCULAR; INTRAVENOUS at 10:07

## 2022-07-15 RX ADMIN — MIDAZOLAM 1 MG: 1 INJECTION INTRAMUSCULAR; INTRAVENOUS at 06:07

## 2022-07-15 RX ADMIN — EPINEPHRINE 0.08 MCG/KG/MIN: 1 INJECTION INTRAMUSCULAR; INTRAVENOUS; SUBCUTANEOUS at 11:07

## 2022-07-15 RX ADMIN — DEXMEDETOMIDINE HYDROCHLORIDE 0.4 MCG/KG/HR: 4 INJECTION INTRAVENOUS at 01:07

## 2022-07-15 RX ADMIN — SODIUM CHLORIDE: 0.9 INJECTION, SOLUTION INTRAVENOUS at 09:07

## 2022-07-15 RX ADMIN — ROCURONIUM BROMIDE 30 MG: 10 INJECTION INTRAVENOUS at 07:07

## 2022-07-15 RX ADMIN — ANGIOTENSIN II 60 NG/KG/MIN: 2.5 INJECTION INTRAVENOUS at 01:07

## 2022-07-15 RX ADMIN — HYDROCORTISONE SODIUM SUCCINATE 100 MG: 100 INJECTION, POWDER, FOR SOLUTION INTRAMUSCULAR; INTRAVENOUS at 09:07

## 2022-07-15 RX ADMIN — HYDROCORTISONE SODIUM SUCCINATE 100 MG: 100 INJECTION, POWDER, FOR SOLUTION INTRAMUSCULAR; INTRAVENOUS at 06:07

## 2022-07-15 RX ADMIN — PROPOFOL 20 MCG/KG/MIN: 10 INJECTION, EMULSION INTRAVENOUS at 04:07

## 2022-07-15 RX ADMIN — HEPARIN SODIUM 100 UNITS/HR: 5000 INJECTION INTRAVENOUS; SUBCUTANEOUS at 08:07

## 2022-07-15 RX ADMIN — NOREPINEPHRINE BITARTRATE 0.04 MCG/KG/MIN: 8 INJECTION, SOLUTION INTRAVENOUS at 11:07

## 2022-07-15 RX ADMIN — NEOSTIGMINE METHYLSULFATE 4 MG: 0.5 INJECTION INTRAVENOUS at 10:07

## 2022-07-15 RX ADMIN — CEFEPIME 2 G: 2 INJECTION, POWDER, FOR SOLUTION INTRAVENOUS at 08:07

## 2022-07-15 RX ADMIN — CALCIUM GLUCONATE 1 G: 20 INJECTION, SOLUTION INTRAVENOUS at 12:07

## 2022-07-15 RX ADMIN — MUPIROCIN: 20 OINTMENT TOPICAL at 09:07

## 2022-07-15 RX ADMIN — MIDAZOLAM 1 MG: 1 INJECTION INTRAMUSCULAR; INTRAVENOUS at 07:07

## 2022-07-15 RX ADMIN — HYDROMORPHONE HYDROCHLORIDE 1 MG: 1 INJECTION, SOLUTION INTRAMUSCULAR; INTRAVENOUS; SUBCUTANEOUS at 08:07

## 2022-07-15 RX ADMIN — ROCURONIUM BROMIDE 30 MG: 10 INJECTION INTRAVENOUS at 06:07

## 2022-07-15 RX ADMIN — HYDROMORPHONE HYDROCHLORIDE 1 MG: 1 INJECTION, SOLUTION INTRAMUSCULAR; INTRAVENOUS; SUBCUTANEOUS at 01:07

## 2022-07-15 RX ADMIN — DEXMEDETOMIDINE HYDROCHLORIDE 0.4 MCG/KG/HR: 4 INJECTION INTRAVENOUS at 08:07

## 2022-07-15 RX ADMIN — FUROSEMIDE 10 MG/HR: 10 INJECTION, SOLUTION INTRAMUSCULAR; INTRAVENOUS at 12:07

## 2022-07-15 RX ADMIN — ANGIOTENSIN II 40 NG/KG/MIN: 2.5 INJECTION INTRAVENOUS at 07:07

## 2022-07-15 RX ADMIN — CALCIUM GLUCONATE 1 G: 20 INJECTION, SOLUTION INTRAVENOUS at 11:07

## 2022-07-15 RX ADMIN — HYDROCORTISONE SODIUM SUCCINATE 100 MG: 100 INJECTION, POWDER, FOR SOLUTION INTRAMUSCULAR; INTRAVENOUS at 02:07

## 2022-07-15 NOTE — PROGRESS NOTES
Pharmacist Renal Dose Adjustment Note    Tim Richards is a 56 y.o. male being treated with the medication pepcid    Patient Data:    Vital Signs (Most Recent):  Temp: 98.06 °F (36.7 °C) (07/15/22 0525)  Pulse: 97 (07/15/22 0600)  Resp: 14 (07/14/22 2346)  BP: (!) 70/0 (07/15/22 0300)  SpO2: 99 % (07/14/22 2000) Vital Signs (72h Range):  Temp:  [97.5 °F (36.4 °C)-98.6 °F (37 °C)]   Pulse:  []   Resp:  [12-45]   BP: ()/(0-78)   SpO2:  [60 %-100 %]   Arterial Line BP: (65-88)/(47-73)      Recent Labs   Lab 07/14/22  2105 07/15/22  0024 07/15/22  0401   CREATININE 2.8* 2.9* 3.5*     Serum creatinine: 3.5 mg/dL (H) 07/15/22 0401  Estimated creatinine clearance: 33 mL/min (A)    Medication:pepcid dose: 20mg frequency bid will be changed to medication:pepcid dose:20mg frequency:daily    Pharmacist's Name: Jessica Asif  Pharmacist's Extension:

## 2022-07-15 NOTE — PROGRESS NOTES
[facility] - Surgical Intensive Care  Cardiothoracic Surgery  Evaluation and Management/VAD interrogation   ECMO    Patient Name: Tim Richards  MRN: [id number]  Admission Date: 6/27/2022  Hospital Length of Stay: 18 days  Code Status: Full Code   Attending Physician: Yg Kaufman MD   Referring Provider: Self, Aaareferral  Principal Problem:Left ventricular assist device (LVAD) complication    Subjective:     Post-Op Info:  Procedure(s) (LRB):  CLOSURE, WOUND, STERNUM (N/A)  APPLICATION, WOUND VAC (N/A)  INSERTION, GRAFT, PERICARDIUM (N/A)  IRRIGATION, MEDIASTINUM (N/A)   Day of Surgery     Subjective:     Post-Op Info:  Procedure(s) (LRB):  CLOSURE, WOUND, STERNUM (N/A)  APPLICATION, WOUND VAC (N/A)  INSERTION, GRAFT, PERICARDIUM (N/A)  IRRIGATION, MEDIASTINUM (N/A)   Day of Surgery      Reason for Visit:  Patient is seen in follow up management of ECMO/LVAD    Interval History: Progressing, see SICU noted for full details.  No clots noted in oxygenator.  Will continue to monitor      Medications:  Continuous Infusions:   angiotensin II 40 ng/kg/min (07/15/22 1300)    dexmedetomidine (PRECEDEX) infusion 0.4 mcg/kg/hr (07/15/22 1328)    DOPamine 3 mcg/kg/min (07/15/22 1340)    EPINEPHrine 0.08 mcg/kg/min (07/15/22 1300)    furosemide (LASIX) 2 mg/mL continuous infusion (non-titrating) 10 mg/hr (07/15/22 1300)    insulin regular 1 units/mL infusion orderable (CTS POST-OP) 1 Units/hr (07/15/22 1300)    nitric oxide gas      NORepinephrine bitartrate-D5W      propofoL 10 mcg/kg/min (07/15/22 1300)    vasopressin 0.06 Units/min (07/15/22 1300)     Scheduled Meds:   ceFEPime (MAXIPIME) IVPB  2 g Intravenous Q12H    hydrocortisone sodium succinate  100 mg Intravenous Q8H    levothyroxine  112 mcg Per OG tube Before breakfast    mupirocin   Nasal BID    pantoprazole  40 mg Intravenous BID     PRN Meds:sodium chloride, sodium chloride, sodium chloride, sodium chloride 0.9%, [COMPLETED] calcium gluconate  IVPB **AND** calcium gluconate IVPB, dextrose 10%, dextrose 10%, [COMPLETED] dextrose 10% **AND** dextrose 10% **AND** [COMPLETED] insulin regular, HYDROmorphone, HYDROmorphone     Objective:     Vital Signs (Most Recent):  Temp: 98.06 °F (36.7 °C) (07/15/22 1400)  Pulse: (!) 117 (07/15/22 1400)  Resp: 17 (07/15/22 1359)  BP: 97/86 (07/15/22 1022)  SpO2: (!) 92 % (07/15/22 0700)   Vital Signs (24h Range):  Temp:  [97.5 °F (36.4 °C)-98.42 °F (36.9 °C)] 98.06 °F (36.7 °C)  Pulse:  [] 117  Resp:  [14-17] 17  SpO2:  [85 %-99 %] 92 %  BP: (70-97)/(0-86) 97/86  Arterial Line BP: (69-83)/(58-71) 78/63       Intake/Output Summary (Last 24 hours) at 7/15/2022 1408  Last data filed at 7/15/2022 1400  Gross per 24 hour   Intake 3310.88 ml   Output 5035 ml   Net -1724.12 ml       Physical Exam  Constitutional:       Comments: Intubated and sedated   HENT:      Head: Normocephalic and atraumatic.      Mouth/Throat:      Comments: OG in place  Eyes:      Pupils: Pupils are equal, round, and reactive to light.   Neck:      Comments: L IJ CVC  R IJ trialysis  Cardiovascular:      Rate and Rhythm: Regular rhythm. Tachycardia present.      Comments: On V-A ECMO -- 3600 rpm, 3.0L  LVAD in place -- 5600 rpm, 4.7L    Chest is open. Covered with ioban    2 plerual and 2 mediastinal chest tubes to suction. Clots beginning to form in the tubes.    V pacing wires in place.   Pulmonary:      Comments: Mechanically ventilated  Abdominal:      General: There is no distension.      Palpations: Abdomen is soft.   Genitourinary:     Comments: [place] in place draining clear urine  Musculoskeletal:      Comments: ECMO cannula x2 (right femoral artery, right femoral vein)    L brachial, right radial arterial line   Skin:     General: Skin is warm and dry.   Neurological:      Comments: Following commands when sedation paused       Significant Labs:  BMP:   Recent Labs   Lab 07/15/22  1029   *      K 4.9      CO2 28   BUN 39*    CREATININE 3.6*   CALCIUM 8.1*   MG 2.6     CBC:   Recent Labs   Lab 07/15/22  1029   WBC 25.18*   RBC 2.98*   HGB 8.7*   HCT 25.7*   PLT 93*   MCV 86   MCH 29.2   MCHC 33.9     CMP:   Recent Labs   Lab 07/15/22  1029   *   CALCIUM 8.1*   ALBUMIN 2.2*   PROT 4.0*      K 4.9   CO2 28      BUN 39*   CREATININE 3.6*   ALKPHOS 64   *   *   BILITOT 6.4*     Coagulation:   Recent Labs   Lab 07/15/22  1029   INR 1.5*   APTT 33.0*       Significant Diagnostics:  I have reviewed and interpreted all pertinent imaging results/findings within the past 24 hours.    Procedure: Device Interrogation Including analysis of device parameters  Current Settings: Ventricular Assist Device  Review of device function is stable  TXP LVAD INTERROGATIONS 7/15/2022 7/15/2022 7/15/2022 7/15/2022 7/15/2022 7/15/2022 7/15/2022   Type HeartMate3 HeartMate3 HeartMate3 HeartMate3 HeartMate3 HeartMate3 HeartMate3   Flow 4.7 4.7 4.7 4.7 4.7 4.7 4.6   Speed 5500 5500 5500 5600 5600 5600 5600   PI 1.7 1.6 1.6 2.2 2.5 2.6 2.9   Power (Jackson) 4 4 3.9 4.1 4.1 4.1 4.1   LSL 5200 5200 5200 5200 5200 5200 5200   Pulsatility - - - - - - Intermittent pulse         Assessment/Plan:     LVAD (left ventricular assist device) present  - Interval History was obtained from ICU attending team member during rounding today.  - VAD/DANIELLE teaching was not performed with patient.  - Mobilization/Physical Therapy is ongoing.  - Anticoagulation held  - To OR today for chest closure  - Remains on ECMO  - See Dr. Amezcua note for medication details    More than 50 percent of the care dominated counseling and coordinating care with different team members. The VAD was interrogated and all parameters were found in the history. All these findings are documented in the note above.        Adriana Jimenez NP  Cardiothoracic Surgery  [facility] - Surgical Intensive Care

## 2022-07-15 NOTE — ANESTHESIA PROCEDURE NOTES
BRITTANY    Diagnosis: Complication involving left ventricular assist device (LVAD), subsequent encounter [T82.9XXD]  Patient location during procedure: OR  Exam type: Baseline    Staffing  Performed: fellow     Anesthesiologist: Kerri Drake MD        Anesthesiologist Present  Yes      Setup & Induction  Probe Insertion: easy  Exam: completeDoppler Echo: 2D, continuous wave Doppler, color flow mapping and pulse wave Doppler.  Exam     Left Heart  Left Atruim: dilated    Left Ventricle: cm, severely abnormal (men >/= 6.8; women>/= 6.1)    LVAD:yes  Inflow Cannula and Direction: midline  Septum:D-shaped  Estimated Ejection Fraction: < 25% severe            Right Heart  Right Ventricle Function: severely decreased  Right Atria:  normal, RA Device    Intra Atrial Septum  PFO: no shunt by color flow doppler  no IAS aneurysm  no lipomatous hypertrophy  no Atrial Septal Defect (Asd)    Right Ventricle    Aortic Valve:  Stenosis: none.  Morphology: trileaflet    Regurgitation: mild (2+) aortic regurgitation      Mitral Valve:   Morphology:normal  Jet Description: mild-to-moderate and centrally-directed    Tricuspid Valve:  Morphology: normal  Regurgitation: moderate    Pulmonic Valve:  Morphology:normal  Regurgitation(color flow): none          Effusions    SummaryFindings discussed with surgeon.    Other Findings

## 2022-07-15 NOTE — CARE UPDATE
Mr. Richards presents to the SICU now s/p sternal closure. On admission  is intubated, sedated, and in stable condition. Pressor requirements upon admission are 0.08 of Epi, 0.08 of Levo, 40 Giapreza, 3 of dopamine and 0.04 of Vaso. Blood pressures are stable with MAPs maintaining greater than 65.    Intraoperatively received 100cc of crystalloid,. Urine output intraoperatively recorded as 650mL.    Immediate post-operative plans include hemodynamic stabilization and weaning of cardiac and respiratory support. Plans to wean vasopressors as tolerated while maintaining epi, dopamine and giapreza at current values, and closely monitor fluid status with strict I's+O's and continued diuresis as needed.    Dang Amezcua MD  General Surgery, PGY-2

## 2022-07-15 NOTE — SUBJECTIVE & OBJECTIVE
Past Medical History:   Diagnosis Date    Anticoagulant long-term use     CHF (congestive heart failure)     Class 1 obesity due to excess calories with serious comorbidity and body mass index (BMI) of 31.0 to 31.9 in adult     Dilated cardiomyopathy 1/10/2018    Disorder of kidney and ureter     CKD    Encounter for blood transfusion     Gout     HTN (hypertension)     Hx of psychiatric care     ICD (implantable cardioverter-defibrillator) infection 7/1/2020    Psychiatric problem     Thyroid disease     Ventricular tachycardia (paroxysmal)        Past Surgical History:   Procedure Laterality Date    AORTIC VALVULOPLASTY N/A 7/13/2022    Procedure: REPAIR, AORTIC VALVE;  Surgeon: Yg Kaufman MD;  [facility];  Service: Cardiovascular;  Laterality: N/A;    CARDIAC CATHETERIZATION  Dec. 2012    CARDIAC DEFIBRILLATOR PLACEMENT Left     CRRT-D    COLONOSCOPY N/A 3/6/2018    Procedure: COLONOSCOPY;  Surgeon: Alonso Bone MD;  Location: [facility] ENDO (2ND FLR);  Service: Endoscopy;  Laterality: N/A;    COLONOSCOPY N/A 7/17/2019    Procedure: COLONOSCOPY;  Surgeon: Blane Valdez MD;  Location: [facility] ENDO (2ND FLR);  Service: Endoscopy;  Laterality: N/A;    COLONOSCOPY N/A 7/18/2019    Procedure: COLONOSCOPY;  Surgeon: Blane Valdez MD;  Location: [facility] ENDO (2ND FLR);  Service: Endoscopy;  Laterality: N/A;    ESOPHAGOGASTRODUODENOSCOPY N/A 7/17/2019    Procedure: EGD (ESOPHAGOGASTRODUODENOSCOPY);  Surgeon: Blane Valdez MD;  Location: [facility] ENDO (2ND FLR);  Service: Endoscopy;  Laterality: N/A;    ESOPHAGOGASTRODUODENOSCOPY N/A 7/18/2019    Procedure: EGD (ESOPHAGOGASTRODUODENOSCOPY);  Surgeon: Blane Valdez MD;  Location: [facility] ENDO (2ND FLR);  Service: Endoscopy;  Laterality: N/A;    LYSIS OF ADHESIONS  7/13/2022    Procedure: LYSIS, ADHESIONS;  Surgeon: Yg Kaufman MD;  [facility];  Service: Cardiovascular;;    NONINVASIVE CARDIAC ELECTROPHYSIOLOGY STUDY N/A 10/18/2019    Procedure: CARDIAC  ELECTROPHYSIOLOGY STUDY, NONINVASIVE;  Surgeon: Raz Wagner MD;  Location: [facility] EP LAB;  Service: Cardiology;  Laterality: N/A;  VT, DFTs, MDT CRTD in situ, LVAD, LASHELL pizarro, 3098    REPLACEMENT OF IMPLANTABLE CARDIOVERTER-DEFIBRILLATOR (ICD) GENERATOR N/A 3/9/2020    Procedure: REPLACEMENT, ICD GENERATOR;  Surgeon: Harry Yun MD;  Location: [facility] EP LAB;  Service: Cardiology;  Laterality: N/A;  VT, ICD Gen Change and Lead Revision, MDT, MAC, DM,3 Prep    REPLACEMENT OF LEFT VENTRICULAR ASSIST DEVICE (LVAD)  7/13/2022    Procedure: REPLACEMENT, LVAD;  Surgeon: Yg Kaufman MD;  Location: [facility] OR [facility] FLR;  Service: Cardiovascular;;    REPLACEMENT OF PUMP N/A 7/13/2022    Procedure: REPLACEMENT, PUMP;  Surgeon: Yg Kaufman MD;  Location: [facility] OR [facility] FLR;  Service: Cardiovascular;  Laterality: N/A;  LVAD pump exchange  EXPLANATION OF HEATMATE 2  IMPLANTATION OF HEARTMATE 3  IMPLANTATION OF 8MM CHIMNEY GRAFT TO RFA  INITIATION OF ECMO  TEMPORARY CLOSURE OF CHEST    REVISION OF IMPLANTABLE CARDIOVERTER-DEFIBRILLATOR (ICD) ELECTRODE LEAD PLACEMENT N/A 3/9/2020    Procedure: REVISION, INSERTION, ELECTRODE LEAD, ICD;  Surgeon: Harry Yun MD;  Location: [facility] EP LAB;  Service: Cardiology;  Laterality: N/A;  VT, ICD Gen Change and Lead Revision, MDT, MAC, DM,3 Prep    STERNAL WOUND CLOSURE N/A 7/14/2022    Procedure: CLOSURE, WOUND, STERNUM;  Surgeon: Yg Kaufman MD;  Location: [facility] OR [facility] FLR;  Service: Cardiovascular;  Laterality: N/A;  temporary closure  evacuation of hematoma    TREATMENT OF CARDIAC ARRHYTHMIA  10/18/2019    Procedure: Cardioversion or Defibrillation;  Surgeon: Raz Wagner MD;  Location: [facility] EP LAB;  Service: Cardiology;;       Review of patient's allergies indicates:   Allergen Reactions    Lisinopril Anaphylaxis    Hydralazine analogues      Chronic constipation, impotence, dizziness     Current Facility-Administered Medications   Medication Frequency    0.9%  NaCl  infusion (for blood administration) Q24H PRN    0.9%  NaCl infusion (for blood administration) Q24H PRN    0.9%  NaCl infusion (for blood administration) Q24H PRN    0.9%  NaCl infusion PRN    angiotensin II (GIAPREZA) 2,500,000 ng in sodium chloride 0.9% 250 mL infusion Continuous    calcium gluconate 1 g in NS IVPB (premixed) Q10 Min PRN    cefepime in dextrose 5 % IVPB 2 g Q12H    dexmedetomidine (PRECEDEX) 400mcg/100mL 0.9% NaCL infusion Continuous    dextrose 10% bolus 125 mL PRN    dextrose 10% bolus 250 mL PRN    dextrose 10% bolus 250 mL PRN    DOPamine 400 mg in dextrose 5 % 250 mL infusion (premix) Continuous    EPINEPHrine (ADRENALIN) 10 mg in dextrose 5 % 250 mL infusion Continuous    furosemide (LASIX) 200 mg in dextrose 5 % 100 mL continuous infusion (conc: 2 mg/mL) Continuous    hydrocortisone sodium succinate injection 100 mg Q8H    HYDROmorphone injection 1 mg Q4H PRN    HYDROmorphone injection 2 mg Q4H PRN    insulin regular in 0.9 % NaCl 100 unit/100 mL (1 unit/mL) infusion Continuous    levothyroxine tablet 112 mcg Before breakfast    mupirocin 2 % ointment BID    nitric oxide gas Gas 20 ppm Continuous    NORepinephrine 8 mg in dextrose 5% 250 mL infusion Continuous    pantoprazole injection 40 mg BID    propofol (DIPRIVAN) 10 mg/mL infusion Continuous    vasopressin (PITRESSIN) 1 Units/mL in dextrose 5 % 100 mL infusion Continuous     Family History       Problem Relation (Age of Onset)    Cancer Sister (54)    Coronary artery disease Father    Diabetes Father    Heart disease Father    Hypertension Father    No Known Problems Mother, Brother          Tobacco Use    Smoking status: Former Smoker     Packs/day: 1.00     Years: 31.00     Pack years: 31.00     Types: Cigarettes     Quit date: 2018     Years since quittin.5    Smokeless tobacco: Never Used    Tobacco comment: pt is quiting on his own - pt stated not qualified for program;  pt  quit on his own   Substance and Sexual  Activity    Alcohol use: No     Alcohol/week: 0.0 standard drinks     Comment: quit    Drug use: No    Sexual activity: Yes     Partners: Female     Birth control/protection: None     Comment: 10/5/17  with same partner 7 years      Review of Systems   Unable to perform ROS: Intubated   Objective:     Vital Signs (Most Recent):  Temp: 98.06 °F (36.7 °C) (07/15/22 1530)  Pulse: (!) 114 (07/15/22 1530)  Resp: 17 (07/15/22 1359)  BP: 97/86 (07/15/22 1022)  SpO2: 100 % (07/15/22 1500)  O2 Device (Oxygen Therapy): ventilator (07/15/22 1500)   Vital Signs (24h Range):  Temp:  [97.5 °F (36.4 °C)-98.42 °F (36.9 °C)] 98.06 °F (36.7 °C)  Pulse:  [] 114  Resp:  [14-17] 17  SpO2:  [92 %-100 %] 100 %  BP: (70-97)/(0-86) 97/86  Arterial Line BP: (69-83)/(54-71) 74/58     Weight: 124.7 kg (275 lb) (07/15/22 0500)  Body mass index is 36.29 kg/m².  Body surface area is 2.53 meters squared.    I/O last 3 completed shifts:  In: 89453.7 [I.V.:5770.1; Blood:6878; IV Piggyback:3160.6]  Out: 9810 [Urine:5745; Chest Tube:4065]    Physical Exam  Vitals and nursing note reviewed.   Constitutional:       General: He is in acute distress.      Appearance: He is ill-appearing. He is not toxic-appearing or diaphoretic.   HENT:      Mouth/Throat:      Mouth: Mucous membranes are moist.   Cardiovascular:      Rate and Rhythm: Normal rate and regular rhythm.      Pulses: Normal pulses.      Heart sounds: Murmur heard.      Comments: Intubated   Ecmo lines   Trialysis catheter   Pulmonary:      Effort: Pulmonary effort is normal. No respiratory distress.      Breath sounds: Normal breath sounds. No stridor. No wheezing, rhonchi or rales.      Comments: intubated  Abdominal:      General: There is no distension.      Palpations: There is no mass.   Genitourinary:     Comments: Dark yellow colored urine in sun collection bag   Musculoskeletal:         General: Swelling present. No tenderness, deformity or signs of injury.      Right  lower leg: Edema present.      Left lower leg: Edema present.   Skin:     General: Skin is warm.      Capillary Refill: Capillary refill takes 2 to 3 seconds.      Coloration: Skin is pale. Skin is not jaundiced.      Findings: No bruising, erythema, lesion or rash.       Significant Labs:  ABGs:   Recent Labs   Lab 07/15/22  1422   PH 7.418   PCO2 47.9*   HCO3 31.0*   POCSATURATED 98   BE 6     Cardiac Markers: No results for input(s): CKMB, TROPONINT, MYOGLOBIN in the last 168 hours.  CBC:   Recent Labs   Lab 07/15/22  1426   WBC 24.45*   RBC 2.97*   HGB 8.7*   HCT 25.4*   PLT 81*   MCV 86   MCH 29.3   MCHC 34.3     CMP:   Recent Labs   Lab 07/15/22  1426   *   CALCIUM 8.1*   ALBUMIN 2.0*   PROT 4.5*      K 4.7   CO2 27      BUN 39*   CREATININE 3.6*   ALKPHOS 66   *   *   BILITOT 6.5*     LFTs:   Recent Labs   Lab 07/15/22  1426   *   *   ALKPHOS 66   BILITOT 6.5*   PROT 4.5*   ALBUMIN 2.0*     Microbiology Results (last 7 days)       ** No results found for the last 168 hours. **          All labs within the past 24 hours have been reviewed.    Significant Imaging:  Labs: Reviewed  X-Ray: Reviewed  CT: Reviewed

## 2022-07-15 NOTE — PROGRESS NOTES
[facility] shift report    Date: 07/15/2022  ECMO Specialist:  Penny Mosley    Pump parameters:  RPM: 4000  Flow:  3.4  -  3.6  Sweep:  4L  FiO2:  100%  Oxygenator status:  Clots: pre and post Oxy  Fibrin: pre and post Oxy and CDI line    Pressure trends:  P1:270   P2: 250  Delta P: 20    Volume status:  Chugging noted?No  CVP: 11 - 8  MAP:  >65  MD notified (name):  Dr Kaufman      Cannula size / status / placement:  Return cannula / from circuit to patient: 18Fr  RFA @ 9.5cm  Drainage cannula / from patient to circuit: 27Fr  RFV @ 5.5     Additional Comments: Chest irrigated and closed today/ Dr Kaufman

## 2022-07-15 NOTE — OP NOTE
Date of service:  7/13/2022    Preoperative diagnosis:   1.  Acute on chronic systolic and diastolic heart failure, NYHA Class IV, INTERMACS 2  2.  Cardiogenic shock  3. Pump thrombosis  4. Acute on chronic heart failure  5. Left ventricular assist device complication  6. Shortness of breath  7. Sustained ventricular tachycardia  8. Class 1 obesity due to excess calories with serious comorbidity and body mass index of 32-32.9  9. Dilated cardiomyopathy  10. COVID 19 infection  11. Amiodarone induced hyperthyroidism  12. Elevated serum lactate dehydrogenase secondary to pump thrombosis       Postop diagnosis:  Same    Operation:  1.  The redo sternotomy with extensive lysis of adhesions  2. Aortic valve repair  3. Cutdown of the right femoral artery and vein for initiation of cardiopulmonary bypass  4. Explantation of left ventricular assist device HeartMate 2  5. Implantation of left ventricular assist device HeartMate 3  6. Creation of a chimney graft with 8 mm gel weave graft to the right femoral artery  7. Placement of peripheral VA ECMO  8. Initiation of VA ECMO  9. Temporary closure of the chest  10. Extremely complicated and challenging case-6 hours of time was spent for : lysis of adhesions due to extremely dense and calcific adhesions, profound vasoplegia requiring multiple vasopressors and Giapreza at maximal doses, along with massive coagulopathy     Staff surgeon:  Yg Kaufman  First assistant:  Peter Helms  Anesthesia:  GTA  Estimated blood loss:  1500 mL    Key findings of the operation:  1.  Diffuse coagulopathy  2.  Moderate RV dysfunction   3.  Significant hemoconcentration carried out total of 1600 cc.    4.  Patient significantly fluid overload with a CVP of 15   5. Extremely dense and calcific adhesions that increased the operative time along with very challenging visualization of the aortic valve  6. 2 lap sponges were used to pack to achieve hemostasis     Indication of  operation:  This is a 56-year-old male who presented with elevated LDH and COVID-19 infection.  Patient was started on anticoagulation and Integrilin to help in resolution of pump thrombosis.  CT scan and complete workup was done which did not show any outflow graft twist or obstruction.  In spite of aggressive anticoagulation patient LDH continued to be elevated.  Once patient was cleared from COVID-19 standpoint extensive discussion was carried out with the patient about different treatment options.  Patient understood the risks and benefits and wanted to proceed with the replacement of the left ventricular assist device with HeartMate 3. An informed consent was obtained.     Operation:  Patient was brought to the operating room and placed in supine position.  After induction of anesthesia area was prepped and draped in the usual sterile fashion.  A right groin cutdown was initiated for initiation of cardiopulmonary bypass due to the redo nature of the operation.  The femoral artery and femoral vein were dissected out and cannulation stitches were placed.  Once that was done attention was then directed towards the sternal incision.  A previously healed sternal incision was incised and incision carried all the way down to the sternum.  Cauterize a shin was used to achieve hemostasis.  Sternal wires were then isolated and cut and left in place.  Using redo oscillating saw the anterior table of the sternum was cut.  The sternal wires were passed of the field.  Using curved Loomis scissors posterior table was cut and sternum was  from the underlying heart.  Meticulous dissection was used to dissect the underlying heart from the sternum.  Extensive calcific dense adhesions were noted which increased the operative time by more than 4 hours.  Once complete dissection of the heart along with the HeartMate 2 and outflow graft was achieved systemic heparinization was carried out.  Arterial cannula was placed in the  ascending aorta and venous cannula was placed in the right atrial appendage.  CO2 was used to Flood the operative field.  Patient was placed on full cardiopulmonary bypass and at clamp on the outflow graft was applied followed by stopping of the HeartMate 2.  Further posterior dissection of the heart and the LVAD was carried out to have complete circumferential control.  Driveline was cut and the HeartMate 2 was disconnected from the sewing ring.  Cross-clamp was applied and an aortotomy was made.  Using a handheld antegrade cardioplegia as well as retrograde cardioplegia catheter that was placed in the coronary sinus a cardiac standstill was obtained.  Once that was achieved visualization of the aortic valve was carried out.  The non coronary cusp was found to be prolapsing.  Aortic valve was otherwise normal.  A decision was made to proceed with the aortic valve repair. 5-0 Prolene suture was passed through each node of Arantus.  The suture was then tied down and the valve was tested for patency.  No for the prolapse was noted and minimal aortic insufficiency was seen.  Once that was achieved the ascending aorta was then closed in 2 layers using 4-0 Prolene suture in a continuous running fashion.  Once this was achieved by ago was used to achieve further hemostasis.  Attention was then directed towards the hotshot and after 3 minutes of delivery the cross-clamp was then removed.  Instant cardiac activity was noted.  Once this was achieved, the heart was brought to the field by placing multiple lap sponges behind the heart.  Previously placed sewing ring of HeartMate 2 was then excised in entirety along with pledgetted sutures.  Four quadrant stitches were placed and passed through the new HeartMate 3 sewing ring.  The needles were cut and passed of the field followed by tying of the sutures.  Before this step we ensured absence of any intraventricular thrombus.  The HeartMate 3 was then brought to the field and  secured to this sewing ring.  Using a 3-0 Prolene suture a continuous running anastomosis of the epicardium to the sewing ring was achieved to obtain good hemostasis.  After achieving good hemostasis attention was directed towards the outflow graft.  HeartMate 3 outflow graft was measured to appropriate length and beveled at 45 degree angle.  Using a side-biting clamp the ascending aorta was prepared for anastomosis.  Using a 5-0 Prolene suture continuous running anastomosis was performed of the outflow graft to the ascending aorta.  Side-biting clamp was then removed.  De-airing of the outflow graft was carried out.  Wet to wet connection of the outflow graft to the LVAD was carried out.  Aortic root vent was placed in the ascending aorta.  The driveline was then connected to the console and started at 3000 RPMs.  Ventilation was resumed.  Electrolytes was found to be within normal limits.  Gradually with volume loading the speed of the left ventricular assist device was increased.  Patient was transitioned from the cardiopulmonary bypass to HeartMate 3. Severe vaso plegia was noted.  Patient was able to be for flowed at 9000 RPMs providing is 6.2 L of flow.  At this stage patient was on high I inotropic and vasopressor support.  In spite of extensive support patient was very vaso plegic.  Mean arterial pressures were 42 mmHg.  A decision was made to administer methylene blue along with steroids.  Any of these maneuvers did not result in any improvement in hemodynamics.  Dr. Rhodes was called to the operating room to provide medical support.  In spite of all measures patient remained vaso plegic with increasing lactate.  As a result a decision was made to provide VA ECMO support.  A 8 mm gel weave graft was anastomosed as a chimney graft to the right femoral artery bypass applying a partial occluding clamp.  Using 5-0 Prolene suture continuous running anastomosis was achieved.  This was tunneled subcutaneously to I  new exit site.  The femoral vein was then used for cannulation purposes for a 27 venous cannula.  This venous cannula was also tunneled subcutaneously.  Using Seldinger technique the cannula was advanced and under BRITTANY guidance was placed in the IVC right atrial junction.  A 18 [place] arterial cannula was placed through the newly anastomosed 8 mm gel weave graft.  The cannula was secured in such a way that the tip of the cannula was above the anastomosis to prevent both proximal and distal flow to the femoral artery.  Once that was done vent to wet connection of both the cannulas were done to the ECMO circuit.  The ECMO circuit was then initiated and gradually the speed of the device was increased to provide a 3-1/2 L flow on VA ECMO circuit.  BRITTANY examination showed a balance of the ventricle and at that stage the HeartMate 3 pump was being run at 5600 RPMs.  Multiple blood products were transfused prior to initiation of ECMO and complete reversal of heparin was also achieved using protamine.  With initiation of VA ECMO significant hemodynamic improvement was noted with mean pressures in the 62-65 mmHg range.  After having adequate hemostasis packing of the mediastinum was carried out multiple drainage tubes were placed in the mediastinum and bilateral pleural spaces and brought through separate skin incision and connected to Pleur-Evac.  Temporary closure of the chest was achieved by using an Esmarch and Ioban to obtain an airtight seal.  Terminal count of needles sponges instrument was found to be correct.  Diverting exit site was sutured with 2-0 Ethibond stitch. Patient was taken back to the intensive care unit in a critical condition.      Medicare attestation:  I was present for all parts of the operation and Dr. Avina acted as my 1st assistant.

## 2022-07-15 NOTE — PROGRESS NOTES
07/15/2022  Casper Harris    Current provider:  Yg Kaufman MD    Device interrogation:  TXP LVAD INTERROGATIONS 7/15/2022 7/15/2022 7/15/2022 7/15/2022 7/15/2022 7/15/2022 7/15/2022   Type HeartMate3 HeartMate3 HeartMate3 HeartMate3 HeartMate3 HeartMate3 HeartMate3   Flow 4.7 4.7 4.7 4.7 4.7 4.6 4.6   Speed 5500 5500 5600 5600 5600 5600 5600   PI 1.6 1.6 2.2 2.5 2.6 2.9 2.4   Power (Jackson) 4 3.9 4.1 4.1 4.1 4.1 4.1   LSL 5200 5200 5200 5200 5200 5200 5200   Pulsatility - - - - - Intermittent pulse -          Rounded on Tim Richards to ensure all mechanical assist device settings (IABP or VAD) were appropriate and all parameters were within limits.  I was able to ensure all back up equipment was present, the staff had no issues, and the Perfusion Department daily rounding was complete.      For implantable VADs: Interrogation of Ventricular assist device was performed with analysis of device parameters and review of device function. I have personally reviewed the interrogation findings and agree with findings as stated.     In emergency, the nursing units have been notified to contact the perfusion department either by:  Calling [phone] from 630am to 4pm Mon thru Fri, utilizing the On-Call Finder functionality of Epic and searching for Perfusion, or by contacting the hospital  from 4pm to 630am and on weekends and asking to speak with the perfusionist on call.    11:25 AM

## 2022-07-15 NOTE — OP NOTE
Date of service:  7/14/2022    Preop diagnosis:    1. Status post left ventricular assist device placement and VA ECMO  2.  Severe Vasoplegia  3.  Diffuse coagulopathy.    Postop diagnosis:  Same      Operation:  1. Washout of the Mediastinum  2. Evacuation of hematoma  3. Temporary closure of chest  4. Removal of 2 lap sponges    Staff surgeon:  Yg Kaufman  First assistant:  Peter Avina  Anesthesia:  GTA  Estimated blood loss:  150 cc    Key findings of the operation:  1.  Significant improvement in diffuse coagulopathy      Indication of operation:  This is a 56-year-old male who underwent left ventricular assist device placement.  Postoperatively patient was visibly weak and required placement of VA ECMO.  Patient was packed and taken to the ICU overnight.  Significant improvement in hemodynamics have occurred over the last 12 hours however chest tube outputs have continued to be about  cc an hour with some accumulation of blood in the left chest.  A decision was made to take the patient back to the operating room for a washout and removal of packing and drainage of pleural effusions.  Risks benefits were discussed and informed consent obtained.    Operation:  Patient was brought to the operating room placed in a supine position after induction of anesthesia, area was prepped and draped in the usual sterile fashion.  Previously placed temporary dressing was taken down.  Chest retractor was placed. Copious amount of irrigation was used to irrigate the chest cavity.  All the chest tubes were irrigated and repositioned.  Previously placed packing material was removed and submitted for pathology.  A total of 2 lap sponges were removed.  All cannulation sites were checked for good hemostasis.  There was still some minimal diffuse coagulopathy and a decision was made to leave the chest open.  Temporary closure of the chest was obtained by using Esmarch an Ioban to obtain an airtight seal after positioning  of chest tubes.  Patient was taken back to the intensive care unit in stable condition.    Medicare registration:  I was present for all parts of the operation and Dr. Avina acted as my 1st assistant.

## 2022-07-15 NOTE — PROGRESS NOTES
Update    LCSW called PT's wife Kelly Richards ([phone]) who states Pt is doing well after chest closure procedure. Kelly states there are no needs or concerns at this time. LCSW let Kelly know that she could reach out to the [facility] LCSWs via the bedside RN if any needs arise. LCSW offered support. SW providing ongoing psychosocial, counseling, & emotional support, education, resources, assistance, and discharge planning as indicated.  SW to continue to follow.

## 2022-07-15 NOTE — PROGRESS NOTES
Pt out of the Surgery.    Cardiac device reprogramming  for AICD        ICD detection and tachy therapies enabled at bedside.    Attempted to enable Smart Pass, unsuccessful.  Contacted KOREY Richards of [employer] and increased the Gain setting from 1X-->2X.   Still not able to Enable Smart Pass.  Sent him PDF of session. He will discuss with Dr. Yun.       Pt's nurse notified of changes.

## 2022-07-15 NOTE — ASSESSMENT & PLAN NOTE
-Ischemic ATN 2/2 decrease perfusion severe hypotension in a patient with known CKD 3b  Baseline sCr: 1.9-2.3  On admission his Cr was: 2.5  Lab Results   Component Value Date    CREATININE 3.6 (H) 07/15/2022     7/15 urine microscopy showed: many granular casts, muddy brown casts, waxy casts, some WBCS, some yeast, and occasional RBCs    Recommendations:   -increase lasix to 20mg/hr and add diuril 500mg x1, page nephrology if pt becomes severely oliguric  -Electrolytes: replace as necessary, renal diet, page nephrology if K >5.5   · For hyperK: may napier lokelma    Phos level 6.8, start phos binders Sevelamer 800mg tid IF not NPO   Mg 2.4, goal >2  -Acid/base: AGMA 2/2 ATN  -Fluid balance: fluid overloaded   -Anemia: Hgb 8.7, send anemia panel labs, transfuse for Hgb <7.0  -Strict I/O's and daily weights  -Renal function panel and Mg levels Q8H   -Check uric acid, CK level, lactic acid, TSH, and urine labs (ordered)  -check Renal ultrasound   -Maintain MAP >65 for renal perfusion    There is no immediate indication for KRT at this time

## 2022-07-15 NOTE — PROGRESS NOTES
Pt in OR for LVAD skin closure  [employer] ICD-tachy detection and therapies disabled in OR.      Please call [phone] post op to have ICD enabled.

## 2022-07-15 NOTE — SUBJECTIVE & OBJECTIVE
Interval History/Significant Events: Back to OR for sternal washout yesterday. Slight increase in pressor requirements overnight. Flows on ECMO and LVAD stable. Good UOP, however positive 1.2 L for yesterday. CT output improved since washout.    Follow-up For: Procedure(s) (LRB):  CLOSURE, WOUND, STERNUM (N/A)  INSERTION-RIGHT VENTRICULAR ASSIST DEVICE (Right)    Post-Operative Day: Day of Surgery    Objective:     Vital Signs (Most Recent):  Temp: 98.06 °F (36.7 °C) (07/15/22 0525)  Pulse: 97 (07/15/22 0600)  Resp: 14 (07/14/22 2346)  BP: (!) 70/0 (07/15/22 0300)  SpO2: 99 % (07/14/22 2000)   Vital Signs (24h Range):  Temp:  [97.5 °F (36.4 °C)-98.42 °F (36.9 °C)] 98.06 °F (36.7 °C)  Pulse:  [] 97  Resp:  [12-14] 14  SpO2:  [85 %-99 %] 99 %  BP: (70-73)/(0-66) 70/0  Arterial Line BP: (69-88)/(60-73) 78/70     Weight: 124.7 kg (275 lb)  Body mass index is 36.29 kg/m².      Intake/Output Summary (Last 24 hours) at 7/15/2022 0644  Last data filed at 7/15/2022 0600  Gross per 24 hour   Intake 6401.24 ml   Output 5535 ml   Net 866.24 ml       Physical Exam  Constitutional:       Comments: Intubated and sedated   HENT:      Head: Normocephalic and atraumatic.      Mouth/Throat:      Comments: OG in place  Eyes:      Pupils: Pupils are equal, round, and reactive to light.   Neck:      Comments: L IJ CVC  R IJ trialysis  Cardiovascular:      Rate and Rhythm: Regular rhythm. Tachycardia present.      Comments: On V-A ECMO -- 3600 rpm, 3.0L  LVAD in place -- 5600 rpm, 4.7L    Chest is open. Covered with ioban    2 plerual and 2 mediastinal chest tubes to suction. Clots beginning to form in the tubes.    V pacing wires in place.   Pulmonary:      Comments: Mechanically ventilated  Abdominal:      General: There is no distension.      Palpations: Abdomen is soft.   Genitourinary:     Comments: Lagunas in place draining clear urine  Musculoskeletal:      Comments: ECMO cannula x2 (right femoral artery, right femoral  vein)    L brachial, right radial arterial line   Skin:     General: Skin is warm and dry.   Neurological:      Comments: Following commands when sedation paused       Vents:  Vent Mode: A/C (07/15/22 0525)  Ventilator Initiated: Yes (07/13/22 2148)  Set Rate: 14 BPM (07/15/22 0525)  Vt Set: 400 mL (07/15/22 0525)  PEEP/CPAP: 7 cmH20 (07/15/22 0525)  Oxygen Concentration (%): 60 (07/15/22 0600)  Peak Airway Pressure: 23 cmH2O (07/15/22 0525)  Plateau Pressure: 24 cmH20 (07/15/22 0525)  Total Ve: 6.45 mL (07/15/22 0525)  Negative Inspiratory Force (cm H2O): 0 (07/14/22 1528)  F/VT Ratio<105 (RSBI): (!) 34.15 (07/14/22 0803)    Lines/Drains/Airways       Central Venous Catheter Line  Duration                  ECMO Cannula 07/13/22 right femoral artery 2 days         ECMO Cannula 07/13/22 right femoral vein 2 days     Introducer with Double Lumen 07/13/22 0900 left internal jugular 1 day    Percutaneous Central Line Insertion/Assessment - Quad Lumen  07/13/22 0941 left internal jugular 1 day    Pulmonary Artery Catheter Assessment  07/13/22 0900 left internal jugular 1 day    Trialysis (Dialysis) Catheter 07/13/22 2100 right internal jugular 1 day              Drain  Duration                  Chest Tube 07/13/22 1916 1 Right Pleural 32 Fr. 1 day         Chest Tube 07/13/22 1916 2 Left Pleural 32 Fr. 1 day         Chest Tube 07/13/22 1916 3 Anterior Mediastinal 32 Fr. 1 day         Chest Tube 07/13/22 1916 4 Posterior Mediastinal 32 Fr. 1 day         Urethral Catheter 07/13/22 0848 Temperature probe;Latex 14 Fr. 1 day         NG/OG Tube 07/14/22 1230 Right mouth <1 day              Airway  Duration                  Airway - Non-Surgical 07/13/22 0848 1 day              Arterial Line  Duration             Arterial Line 07/13/22 0932 Left Brachial 1 day    Arterial Line 07/13/22 2000 Right Radial 1 day              Line  Duration                  VAD 03/08/18 1124 Left ventricular assist device HeartMate II 1589 days          VAD 07/13/22 1300 Left ventricular assist device HeartMate 3 1 day              Peripheral Intravenous Line  Duration                  Midline Catheter Insertion/Assessment  - Single Lumen 06/28/22 1027 Right cephalic vein (lateral side of arm) 20g x 8cm 16 days                    Significant Labs:    CBC/Anemia Profile:  Recent Labs   Lab 07/14/22  2105 07/15/22  0024 07/15/22  0026 07/15/22  0401 07/15/22  0402   WBC 20.54* 18.57*  --  22.61*  --    HGB 9.0* 8.6*  --  8.9*  --    HCT 26.7* 25.7* 24* 26.6* 24*   PLT 93* 75*  --  81*  --    MCV 86 89  --  88  --    RDW 15.7* 15.6*  --  15.6*  --         Chemistries:  Recent Labs   Lab 07/14/22  2105 07/15/22  0024 07/15/22  0401    140 143   K 5.3* 4.8 4.9    106 103   CO2 28 24 26   BUN 25* 30* 31*   CREATININE 2.8* 2.9* 3.5*   CALCIUM 7.8* 8.4* 8.7   ALBUMIN 2.1* 2.0* 2.2*  2.2*   PROT 4.0* 4.1* 4.4*  4.3*   BILITOT 4.9* 5.1* 5.7*  5.6*   ALKPHOS 53* 50* 62  56   * 159* 164*  162*   * 282* 287*  283*   MG 2.3 2.6 2.6   PHOS 5.5* 5.6* 6.1*       ABGs:   Recent Labs   Lab 07/15/22  0620   PH 7.410   PCO2 47.6*   HCO3 30.2*   POCSATURATED 85*   BE 6     Coagulation:   Recent Labs   Lab 07/15/22  0401   INR 1.4*   APTT 32.6*     Lactic Acid:   Recent Labs   Lab 07/13/22  2149   LACTATE >12.0*       Significant Imaging:  I have reviewed all pertinent imaging results/findings within the past 24 hours.

## 2022-07-15 NOTE — TRANSFER OF CARE
"Anesthesia Transfer of Care Note    Patient: Tim Richards    Procedure(s) Performed: Procedure(s) (LRB):  CLOSURE, WOUND, STERNUM (N/A)  APPLICATION, WOUND VAC (N/A)  INSERTION, GRAFT, PERICARDIUM (N/A)  IRRIGATION, MEDIASTINUM (N/A)    Patient location: ICU    Anesthesia Type: general    Transport from OR: Transported from OR intubated on 100% O2 by AMBU with assisted ventilation. Upon arrival to PACU/ICU, patient attached to ventilator and auscultated to confirm bilateral breath sounds and adequate TV. Continuous ECG monitoring in transport. Continuos invasive BP monitoring in transport. Continuous PA pressure monitoring in transport. Continuous CVP monitoring in transport. Continuous SpO2 monitoring in transport    Post pain: adequate analgesia    Post assessment: no apparent anesthetic complications    Post vital signs: stable    Level of consciousness: sedated    Nausea/Vomiting: no nausea/vomiting    Complications: none    Transfer of care protocol was followed      Last vitals:   Visit Vitals  BP 97/86   Pulse 72   Temp 36.7 °C (98.06 °F)   Resp 14   Ht 6' 0.99" (1.854 m)   Wt 124.7 kg (275 lb)   SpO2 99%   BMI 36.29 kg/m²     "

## 2022-07-15 NOTE — PLAN OF CARE
Patient on AC50%/7peep/7Nictric.  ECMO, see flowsheet.  LVAD, see flowsheet.  Patient's chest closed this AM.  Dr. Kaufman at the bedside to review plan of care.  Orders received and implemented.  Questions and concerns addressed with patient's wife throughout the shift.  Refer to flow sheet for vital signs, ggts, and assessments.

## 2022-07-15 NOTE — ASSESSMENT & PLAN NOTE
Tim Richards is a 56 y.o. male HFrEF with an EF of 10% and a history of HM2 LVAD in 2018 who is now s/p HM3 upgrade, initiation of V-A ECMO after failure to wean off bypass x2      Neuro/Psych:   -- Sedation: Propofol.  -- Pain: PRN Dilaudid             Cards:   -- S/P LVAD exchange on 7/14/22  -- Requiring high dose pressors:   Epi 0.1   Levo 0.04   Vaso 0.04   Dopamine 3   Giapreza 60  -- Stress dose steroids  -- Ventricular pacing wires. Paced at 80 BPM  -- MAP goal >65  -- VAD and ECMO flows stable  -- Plan to return to the OR today for sternal closure      Pulm:   -- Goal O2 sat > 90%  -- ABG PRN  -- Mediastinal chest tubes x2 and pleural chest tube x2 to suction  -- Nitric at 15 ppm      Renal:  -- Keep sun for strict I/O  -- Trend BUN/Cr 31/3.5 <-- 20/2.4  -- Lasix drip at 10, received diuril and diamox overnight  -- Goal negative 1-2L      FEN / GI:   -- Replace lytes as needed  -- Nutrition: NPO  -- GI ppx: famotidine      ID:   -- Tm: afebrile; WBC increasing  -- cefepime      Heme/Onc:   -- H/H stable   -- Daily CBC  -- Received 18 pRBCs, 16 FFP, 2 plt, 25 cryo; 1500 albumin intra-op  -- Coags beginning to normalize      Endo:   -- BG goal 140-180  -- Insulin gtt      PPx:   Feeding: NPO  Analgesia/Sedation: Propofol / PRN Dilaudid  Thromboembolic prevention: SCDs  HOB >30: Yes  Stress Ulcer ppx: famotidine  Glucose control: Critical care goal 140-180 g/dl, ISS     Lines/Drains/Airway: ETT; OG; ECMO canula x2; RIJ trialysis, LIJ CVC and gertrude, r-radial a-line, Chest tube x4; sun; W[place], VAD; midline      Dispo/Code Status/Palliative:   -- SICU / Full Code.   -- Plan to return to the OR today

## 2022-07-15 NOTE — PLAN OF CARE
"      SICU PLAN OF CARE NOTE    Dx: Left ventricular assist device (LVAD) complication    Shift Events: Giapreza increased to 60 ng/kg/min, Diuril and Diamox administered, UO increased to 100-200cc/hr, K+ shifted, Transported to OR this morning by [facility] for washout and possible chest closure.    Goals of Care: MAP 70-85    Neuro: Arouses to Voice, Follows Commands, and Moves All Extremities    Vital Signs: BP (!) 70/0 (BP Location: Left arm, Patient Position: Lying)   Pulse 97   Temp 98.06 °F (36.7 °C)   Resp 14   Ht 6' 0.99" (1.854 m)   Wt 124.7 kg (275 lb)   SpO2 99%   BMI 36.29 kg/m²     Respiratory: Ventilator, AC VC, FiO2 60%, PEEP 7    Diet: NPO    Gtts: Epi @ 0.1 mcg/kg/min, Vaso @ 0.04 units/min, Levo @ 0.04 mcg/kg/min, Giapreza @ 60 ng/kg/min, Dopamine @ 3 mcg/kg/min, Lasix @ 10 mg/hr, Propofol, and Insulin    Urine Output: Urinary Catheter  cc/hour    Drains:   Chest tube Right Pleural 120cc/shift  Chest tube Left Pleural 130cc/shift  Chest tube Ant Med 50cc/shift  Chest tube Post Med 160cc/shift    LVAD HM3:  Speed 5600  Flows 4.7-4.8  PI 1.8-3.1  Power 4-4.2    VA ECMO  Speed 3600  Flows 3.0-3.1  Sweep 4  FiO2 100%     Labs/Accuchecks: Accuchecks hourly, Labs Q4H       "

## 2022-07-15 NOTE — HPI
"Tim Richards is a 56 y.o. male w/ Known CKD 3b (without prior nephrology f/u), NICM, end-stage HF, EF 10% s/p HM2 LVAD (2018) and ICD placement (2014), ventricular fibrillation (2020), GI bleed (2019), hypothyroidism, alcohol use (2014), nicotine dependence, and [facility] amitted on 6/27/2022 for evaluation and management of decreased appetite, decreased and dark-colored urine, and increasing shortness of breath.     History obtained from EMR and family at bedside.    In the ED, pt presented with the following VS:   Initial Vitals   BP Pulse Resp Temp SpO2   06/27/22 1129 06/27/22 1038 06/27/22 1038 06/27/22 1038 06/27/22 1810   (!) 94/0 93 20 98 °F (36.7 °C) 95 %      MAP       --                Pt was found to be in acute decompensated heart failure, hypoxic, and severely volume overloaded, for which he was started on BIPAP and on lasix gtt. On 6/30 pt had ventricular tachycardia with ICD shock, this was believed to be 2/2 hypokalemia.   His LVAD was interrogated and there was a concern of LVAD thrombosis and pt was started on Integrilin,however whilke on medical management pt continue with worsening hemolysis and so CTS was consulted and recommended upgrading to HM3 and on 7/13 pt underwent HM 2 explantation, and implantation of HM 3 LVAD. Intraoperatively pt had significant vasoplegic shock and pt was placed on ECMO as well as on vasopressors and steroids for vasodilatory shock. Pt remained intubated postoperatively.   Of note, on admission pt was found to be COVID 19 + and was treated with Remdesivir.     Nephrology was consulted for: "may soon have indication for dialysis"  Baseline sCr: 1.9-2.3  On admission his Cr was: 2.5    On 7/13 (day of surgery) his I/Os were as follows: 20L/4.4L, net +15.6L, urine 1965mls and 2.5L from chest tube    On 7/14 to 7/15 his I/Os were: 6.4/5.5L,net 866mls, 1.6L chest tube and 4L urine     "

## 2022-07-15 NOTE — OP NOTE
Date of service:  07/15/2022    Preop diagnosis:    1. Status post left ventricular assist device placement and VA ECMO  2.  Severe Vasoplegia  3.  Diffuse coagulopathy.    Postop diagnosis:  Same      Operation:  1. Washout of the Mediastinum  2. Creation of Fidel pericardium with Gerotex Membrane  3. Sternal Closure  4. Wound Vac 50 X 5 cm in size due to high risk for wound dehiscence and wound complications  5.   Staff surgeon:  Yg Kaufman  Anesthesia:  GTA  Estimated blood loss:  50 cc    Key findings of the operation:  1.  Significant improvement in diffuse coagulopathy  2.  Improvement in Vasoplegia    Indication of operation:  This is a 56 -year-old male who underwent left ventricular assist device placement and VA ECMO.  Postoperatively the chest was left open due to diffuse coagulopathy.  Over last 48 hrs patient condition has stabilized and the decision was made to take the patient back for possible closure.  Risks benefits were discussed and informed consent obtained.    Operation:  Patient was brought to the operating room placed in a supine position after induction of anesthesia, area was prepped and draped in the usual sterile fashion.  Previously placed temporary dressing was taken down.  Chest retractor was placed.  Copious amount of irrigation was used to irrigate the chest cavity.  All the chest tubes were irrigated and repositioned.  All cannulation sites were checked for good hemostasis.  We attempted to wean the VA ECMO, however, patient had severe hypotension and was not ready for weaning from ECMO. Creation of pneo-pericardium was done with the help of [place] membrane to help in reentry at the time of transplant or LVAD exchange.  Sternum was then approximated with # 6 stainless steel wires.  Skin and subcutaneous tissues were closed in multiple layers. Due to patient body habitus and risk for wound and sternal complications retention stiches and wound vac was applied. A 50 X 5 cm wound vac  was placed with good seal.  Patient was taken back to the intensive care unit in stable condition.    Medicare registration:  Due to unavailability of an adequately trained cardiothoracic surgery resident performed all parts of the operation myself.

## 2022-07-15 NOTE — PROGRESS NOTES
..ECMO Specialists shift report    Date: 07/15/2022  ECMO Specialist:  Pippa Horan    Pump parameters:  RPM: 3600  Flow:  3.0-3.1  Sweep:  4  FiO2:  100%    Oxygenator status:  Clots: pre/post oxy  Fibrin: pre/post oxy     Increasing overnight    Pressure trends:  P1: 222-230  P2: 205-206  Delta P: 17-19  Negative:      Volume status:  Chugging noted none  CVP: 11-13  MAP:  65-75  MD notified (name): Shae    Anticoagulation:  aPTT parameters: 40-80  APTT trends this shift: 32.5/32.1/    Cannula size / status / placement:  Return cannula / from circuit to patient: RFA 18Fr OPtisite 10cm     Drainage cannula / from patient to circuit: RFV 27Fr 5cm          Additional Comments:    No products given.  Pt awakens and follows commands.  Decreased RPMs to maintain flows at 3.  Good urine output throughout the night.  Maps maintained 65-75.

## 2022-07-15 NOTE — SUBJECTIVE & OBJECTIVE
Subjective:     Post-Op Info:  Procedure(s) (LRB):  CLOSURE, WOUND, STERNUM (N/A)  APPLICATION, WOUND VAC (N/A)  INSERTION, GRAFT, PERICARDIUM (N/A)  IRRIGATION, MEDIASTINUM (N/A)   Day of Surgery      Reason for Visit:  Patient is seen in follow up management of ECMO/LVAD    Interval History: Progressing, see SICU noted for full details.  No clots noted in oxygenator.  Will continue to monitor      Medications:  Continuous Infusions:   angiotensin II 40 ng/kg/min (07/15/22 1300)    dexmedetomidine (PRECEDEX) infusion 0.4 mcg/kg/hr (07/15/22 1328)    DOPamine 3 mcg/kg/min (07/15/22 1340)    EPINEPHrine 0.08 mcg/kg/min (07/15/22 1300)    furosemide (LASIX) 2 mg/mL continuous infusion (non-titrating) 10 mg/hr (07/15/22 1300)    insulin regular 1 units/mL infusion orderable (CTS POST-OP) 1 Units/hr (07/15/22 1300)    nitric oxide gas      NORepinephrine bitartrate-D5W      propofoL 10 mcg/kg/min (07/15/22 1300)    vasopressin 0.06 Units/min (07/15/22 1300)     Scheduled Meds:   ceFEPime (MAXIPIME) IVPB  2 g Intravenous Q12H    hydrocortisone sodium succinate  100 mg Intravenous Q8H    levothyroxine  112 mcg Per OG tube Before breakfast    mupirocin   Nasal BID    pantoprazole  40 mg Intravenous BID     PRN Meds:sodium chloride, sodium chloride, sodium chloride, sodium chloride 0.9%, [COMPLETED] calcium gluconate IVPB **AND** calcium gluconate IVPB, dextrose 10%, dextrose 10%, [COMPLETED] dextrose 10% **AND** dextrose 10% **AND** [COMPLETED] insulin regular, HYDROmorphone, HYDROmorphone     Objective:     Vital Signs (Most Recent):  Temp: 98.06 °F (36.7 °C) (07/15/22 1400)  Pulse: (!) 117 (07/15/22 1400)  Resp: 17 (07/15/22 1359)  BP: 97/86 (07/15/22 1022)  SpO2: (!) 92 % (07/15/22 0700)   Vital Signs (24h Range):  Temp:  [97.5 °F (36.4 °C)-98.42 °F (36.9 °C)] 98.06 °F (36.7 °C)  Pulse:  [] 117  Resp:  [14-17] 17  SpO2:  [85 %-99 %] 92 %  BP: (70-97)/(0-86) 97/86  Arterial Line BP: (69-83)/(58-71) 78/63        Intake/Output Summary (Last 24 hours) at 7/15/2022 1408  Last data filed at 7/15/2022 1400  Gross per 24 hour   Intake 3310.88 ml   Output 5035 ml   Net -1724.12 ml       Physical Exam  Constitutional:       Comments: Intubated and sedated   HENT:      Head: Normocephalic and atraumatic.      Mouth/Throat:      Comments: OG in place  Eyes:      Pupils: Pupils are equal, round, and reactive to light.   Neck:      Comments: L IJ CVC  R IJ trialysis  Cardiovascular:      Rate and Rhythm: Regular rhythm. Tachycardia present.      Comments: On V-A ECMO -- 3600 rpm, 3.0L  LVAD in place -- 5600 rpm, 4.7L    Chest is open. Covered with ioban    2 plerual and 2 mediastinal chest tubes to suction. Clots beginning to form in the tubes.    V pacing wires in place.   Pulmonary:      Comments: Mechanically ventilated  Abdominal:      General: There is no distension.      Palpations: Abdomen is soft.   Genitourinary:     Comments: Lagunas in place draining clear urine  Musculoskeletal:      Comments: ECMO cannula x2 (right femoral artery, right femoral vein)    L brachial, right radial arterial line   Skin:     General: Skin is warm and dry.   Neurological:      Comments: Following commands when sedation paused       Significant Labs:  BMP:   Recent Labs   Lab 07/15/22  1029   *      K 4.9      CO2 28   BUN 39*   CREATININE 3.6*   CALCIUM 8.1*   MG 2.6     CBC:   Recent Labs   Lab 07/15/22  1029   WBC 25.18*   RBC 2.98*   HGB 8.7*   HCT 25.7*   PLT 93*   MCV 86   MCH 29.2   MCHC 33.9     CMP:   Recent Labs   Lab 07/15/22  1029   *   CALCIUM 8.1*   ALBUMIN 2.2*   PROT 4.0*      K 4.9   CO2 28      BUN 39*   CREATININE 3.6*   ALKPHOS 64   *   *   BILITOT 6.4*     Coagulation:   Recent Labs   Lab 07/15/22  1029   INR 1.5*   APTT 33.0*       Significant Diagnostics:  I have reviewed and interpreted all pertinent imaging results/findings within the past 24 hours.    Procedure:  Device Interrogation Including analysis of device parameters  Current Settings: Ventricular Assist Device  Review of device function is stable  TXP LVAD INTERROGATIONS 7/15/2022 7/15/2022 7/15/2022 7/15/2022 7/15/2022 7/15/2022 7/15/2022   Type HeartMate3 HeartMate3 HeartMate3 HeartMate3 HeartMate3 HeartMate3 HeartMate3   Flow 4.7 4.7 4.7 4.7 4.7 4.7 4.6   Speed 5500 5500 5500 5600 5600 5600 5600   PI 1.7 1.6 1.6 2.2 2.5 2.6 2.9   Power ([place]) 4 4 3.9 4.1 4.1 4.1 4.1   LSL 5200 5200 5200 5200 5200 5200 5200   Pulsatility - - - - - - Intermittent pulse

## 2022-07-15 NOTE — CONSULTS
"[facility]  Nephrology  Consult Note    Patient Name: Tim Richards  MRN: [id number]  Admission Date: 6/27/2022  Hospital Length of Stay: 18 days  Attending Provider: Yg Kaufman MD   Primary Care Physician: Deyanira Booth MD  Principal Problem:Left ventricular assist device (LVAD) complication    Inpatient consult to Nephrology  Consult performed by: Holly Spangler MD  Consult ordered by: Dang Amezcua MD  Reason for consult: "may soon have indication for dialysis"  Assessment/Recommendations: Please see consult note and staff attestation for full recommendations. Thank you!         Subjective:     HPI:   Tim Richards is a 56 y.o. male w/ Known CKD 3b (without prior nephrology f/u), NICM, end-stage HF, EF 10% s/p HM2 LVAD (2018) and ICD placement (2014), ventricular fibrillation (2020), GI bleed (2019), hypothyroidism, alcohol use (2014), nicotine dependence, and [facility] amitted on 6/27/2022 for evaluation and management of decreased appetite, decreased and dark-colored urine, and increasing shortness of breath.     History obtained from EMR and family at bedside.    In the ED, pt presented with the following VS:   Initial Vitals   BP Pulse Resp Temp SpO2   06/27/22 1129 06/27/22 1038 06/27/22 1038 06/27/22 1038 06/27/22 1810   (!) 94/0 93 20 98 °F (36.7 °C) 95 %      MAP       --                Pt was found to be in acute decompensated heart failure, hypoxic, and severely volume overloaded, for which he was started on BIPAP and on lasix gtt. On 6/30 pt had ventricular tachycardia with ICD shock, this was believed to be 2/2 hypokalemia.   His LVAD was interrogated and there was a concern of LVAD thrombosis and pt was started on Integrilin,however whilke on medical management pt continue with worsening hemolysis and so CTS was consulted and recommended upgrading to HM3 and on 7/13 pt underwent HM 2 explantation, and implantation of HM 3 LVAD. Intraoperatively pt had significant " "vasoplegic shock and pt was placed on ECMO as well as on vasopressors and steroids for vasodilatory shock. Pt remained intubated postoperatively.   Of note, on admission pt was found to be COVID 19 + and was treated with Remdesivir.     Nephrology was consulted for: "may soon have indication for dialysis"  Baseline sCr: 1.9-2.3  On admission his Cr was: 2.5    On 7/13 (day of surgery) his I/Os were as follows: 20L/4.4L, net +15.6L, urine 1965mls and 2.5L from chest tube    On 7/14 to 7/15 his I/Os were: 6.4/5.5L,net 866mls, 1.6L chest tube and 4L urine         Past Medical History:   Diagnosis Date    Anticoagulant long-term use     CHF (congestive heart failure)     Class 1 obesity due to excess calories with serious comorbidity and body mass index (BMI) of 31.0 to 31.9 in adult     Dilated cardiomyopathy 1/10/2018    Disorder of kidney and ureter     CKD    Encounter for blood transfusion     Gout     HTN (hypertension)     Hx of psychiatric care     ICD (implantable cardioverter-defibrillator) infection 7/1/2020    Psychiatric problem     Thyroid disease     Ventricular tachycardia (paroxysmal)        Past Surgical History:   Procedure Laterality Date    AORTIC VALVULOPLASTY N/A 7/13/2022    Procedure: REPAIR, AORTIC VALVE;  Surgeon: Yg Kaufman MD;  [facility] OR [address] [place] [address];  Service: Cardiovascular;  Laterality: N/A;    CARDIAC CATHETERIZATION  Dec. 2012    CARDIAC DEFIBRILLATOR PLACEMENT Left     CRRT-D    COLONOSCOPY N/A 3/6/2018    Procedure: COLONOSCOPY;  Surgeon: Alonso Bone MD;  Location: [facility] ([address] [place] [address]);  Service: Endoscopy;  Laterality: N/A;    COLONOSCOPY N/A 7/17/2019    Procedure: COLONOSCOPY;  Surgeon: Blane Valdez MD;  Location: [facility] ENDO ([address] [place] [address]);  Service: Endoscopy;  Laterality: N/A;    COLONOSCOPY N/A 7/18/2019    Procedure: COLONOSCOPY;  Surgeon: Blane Valdez MD;  Location: [facility] ([address] [place] [address]);  Service: Endoscopy;  Laterality: N/A;    ESOPHAGOGASTRODUODENOSCOPY N/A " 7/17/2019    Procedure: EGD (ESOPHAGOGASTRODUODENOSCOPY);  Surgeon: Blane Valdez MD;  Location: [facility] ENDO (2ND FLR);  Service: Endoscopy;  Laterality: N/A;    ESOPHAGOGASTRODUODENOSCOPY N/A 7/18/2019    Procedure: EGD (ESOPHAGOGASTRODUODENOSCOPY);  Surgeon: Blane Valdez MD;  Location: [facility] ENDO (2ND FLR);  Service: Endoscopy;  Laterality: N/A;    LYSIS OF ADHESIONS  7/13/2022    Procedure: LYSIS, ADHESIONS;  Surgeon: Yg Kaufman MD;  [facility];  Service: Cardiovascular;;    NONINVASIVE CARDIAC ELECTROPHYSIOLOGY STUDY N/A 10/18/2019    Procedure: CARDIAC ELECTROPHYSIOLOGY STUDY, NONINVASIVE;  Surgeon: Raz Wagner MD;  Location: [facility] EP LAB;  Service: Cardiology;  Laterality: N/A;  VT, DFTs, MDT CRTD in situ, LVAD, anes, MB, 3098    REPLACEMENT OF IMPLANTABLE CARDIOVERTER-DEFIBRILLATOR (ICD) GENERATOR N/A 3/9/2020    Procedure: REPLACEMENT, ICD GENERATOR;  Surgeon: Harry Yun MD;  Location: [facility] EP LAB;  Service: Cardiology;  Laterality: N/A;  VT, ICD Gen Change and Lead Revision, MDT, MAC, DM,3 Prep    REPLACEMENT OF LEFT VENTRICULAR ASSIST DEVICE (LVAD)  7/13/2022    Procedure: REPLACEMENT, LVAD;  Surgeon: Yg Kaufman MD;  [facility];  Service: Cardiovascular;;    REPLACEMENT OF PUMP N/A 7/13/2022    Procedure: REPLACEMENT, PUMP;  Surgeon: Yg Kaufman MD;  [facility];  Service: Cardiovascular;  Laterality: N/A;  LVAD pump exchange  EXPLANATION OF HEATMATE 2  IMPLANTATION OF HEARTMATE 3  IMPLANTATION OF 8MM CHIMNEY GRAFT TO RFA  INITIATION OF ECMO  TEMPORARY CLOSURE OF CHEST    REVISION OF IMPLANTABLE CARDIOVERTER-DEFIBRILLATOR (ICD) ELECTRODE LEAD PLACEMENT N/A 3/9/2020    Procedure: REVISION, INSERTION, ELECTRODE LEAD, ICD;  Surgeon: Harry Yun MD;  Location: [facility] EP LAB;  Service: Cardiology;  Laterality: N/A;  VT, ICD Gen Change and Lead Revision, MDT, MAC, DM,3 Prep    STERNAL WOUND CLOSURE N/A 7/14/2022    Procedure: CLOSURE, WOUND,  STERNUM;  Surgeon: Yg Kafuman MD;  [facility];  Service: Cardiovascular;  Laterality: N/A;  temporary closure  evacuation of hematoma    TREATMENT OF CARDIAC ARRHYTHMIA  10/18/2019    Procedure: Cardioversion or Defibrillation;  Surgeon: Raz Wagner MD;  Location: [facility] EP LAB;  Service: Cardiology;;       Review of patient's allergies indicates:   Allergen Reactions    Lisinopril Anaphylaxis    Hydralazine analogues      Chronic constipation, impotence, dizziness     Current Facility-Administered Medications   Medication Frequency    0.9%  NaCl infusion (for blood administration) Q24H PRN    0.9%  NaCl infusion (for blood administration) Q24H PRN    0.9%  NaCl infusion (for blood administration) Q24H PRN    0.9%  NaCl infusion PRN    angiotensin II (GIAPREZA) 2,500,000 ng in sodium chloride 0.9% 250 mL infusion Continuous    calcium gluconate 1 g in NS IVPB (premixed) Q10 Min PRN    cefepime in dextrose 5 % IVPB 2 g Q12H    dexmedetomidine (PRECEDEX) 400mcg/100mL 0.9% NaCL infusion Continuous    dextrose 10% bolus 125 mL PRN    dextrose 10% bolus 250 mL PRN    dextrose 10% bolus 250 mL PRN    DOPamine 400 mg in dextrose 5 % 250 mL infusion (premix) Continuous    EPINEPHrine (ADRENALIN) 10 mg in dextrose 5 % 250 mL infusion Continuous    furosemide (LASIX) 200 mg in dextrose 5 % 100 mL continuous infusion (conc: 2 mg/mL) Continuous    hydrocortisone sodium succinate injection 100 mg Q8H    HYDROmorphone injection 1 mg Q4H PRN    HYDROmorphone injection 2 mg Q4H PRN    insulin regular in 0.9 % NaCl 100 unit/100 mL (1 unit/mL) infusion Continuous    levothyroxine tablet 112 mcg Before breakfast    mupirocin 2 % ointment BID    nitric oxide gas Gas 20 ppm Continuous    NORepinephrine 8 mg in dextrose 5% 250 mL infusion Continuous    pantoprazole injection 40 mg BID    propofol (DIPRIVAN) 10 mg/mL infusion Continuous    vasopressin (PITRESSIN) 1 Units/mL in dextrose  5 % 100 mL infusion Continuous     Family History       Problem Relation (Age of Onset)    Cancer Sister (54)    Coronary artery disease Father    Diabetes Father    Heart disease Father    Hypertension Father    No Known Problems Mother, Brother          Tobacco Use    Smoking status: Former Smoker     Packs/day: 1.00     Years: 31.00     Pack years: 31.00     Types: Cigarettes     Quit date: 2018     Years since quittin.5    Smokeless tobacco: Never Used    Tobacco comment: pt is quiting on his own - pt stated not qualified for program;  pt  quit on his own   Substance and Sexual Activity    Alcohol use: No     Alcohol/week: 0.0 standard drinks     Comment: quit    Drug use: No    Sexual activity: Yes     Partners: Female     Birth control/protection: None     Comment: 10/5/17  with same partner 7 years      Review of Systems   Unable to perform ROS: Intubated   Objective:     Vital Signs (Most Recent):  Temp: 98.06 °F (36.7 °C) (07/15/22 1530)  Pulse: (!) 114 (07/15/22 1530)  Resp: 17 (07/15/22 1359)  BP: 97/86 (07/15/22 1022)  SpO2: 100 % (07/15/22 1500)  O2 Device (Oxygen Therapy): ventilator (07/15/22 1500)   Vital Signs (24h Range):  Temp:  [97.5 °F (36.4 °C)-98.42 °F (36.9 °C)] 98.06 °F (36.7 °C)  Pulse:  [] 114  Resp:  [14-17] 17  SpO2:  [92 %-100 %] 100 %  BP: (70-97)/(0-86) 97/86  Arterial Line BP: (69-83)/(54-71) 74/58     Weight: 124.7 kg (275 lb) (07/15/22 0500)  Body mass index is 36.29 kg/m².  Body surface area is 2.53 meters squared.    I/O last 3 completed shifts:  In: 86585.7 [I.V.:5770.1; Blood:6878; IV Piggyback:3160.6]  Out: 9810 [Urine:5745; Chest Tube:4065]    Physical Exam  Vitals and nursing note reviewed.   Constitutional:       General: He is in acute distress.      Appearance: He is ill-appearing. He is not toxic-appearing or diaphoretic.   HENT:      Mouth/Throat:      Mouth: Mucous membranes are moist.   Cardiovascular:      Rate and Rhythm: Normal rate  and regular rhythm.      Pulses: Normal pulses.      Heart sounds: Murmur heard.      Comments: Intubated   Ecmo lines   Trialysis catheter   Pulmonary:      Effort: Pulmonary effort is normal. No respiratory distress.      Breath sounds: Normal breath sounds. No stridor. No wheezing, rhonchi or rales.      Comments: intubated  Abdominal:      General: There is no distension.      Palpations: There is no mass.   Genitourinary:     Comments: Dark yellow colored urine in sun collection bag   Musculoskeletal:         General: Swelling present. No tenderness, deformity or signs of injury.      Right lower leg: Edema present.      Left lower leg: Edema present.   Skin:     General: Skin is warm.      Capillary Refill: Capillary refill takes 2 to 3 seconds.      Coloration: Skin is pale. Skin is not jaundiced.      Findings: No bruising, erythema, lesion or rash.       Significant Labs:  ABGs:   Recent Labs   Lab 07/15/22  1422   PH 7.418   PCO2 47.9*   HCO3 31.0*   POCSATURATED 98   BE 6     Cardiac Markers: No results for input(s): CKMB, TROPONINT, MYOGLOBIN in the last 168 hours.  CBC:   Recent Labs   Lab 07/15/22  1426   WBC 24.45*   RBC 2.97*   HGB 8.7*   HCT 25.4*   PLT 81*   MCV 86   MCH 29.3   MCHC 34.3     CMP:   Recent Labs   Lab 07/15/22  1426   *   CALCIUM 8.1*   ALBUMIN 2.0*   PROT 4.5*      K 4.7   CO2 27      BUN 39*   CREATININE 3.6*   ALKPHOS 66   *   *   BILITOT 6.5*     LFTs:   Recent Labs   Lab 07/15/22  1426   *   *   ALKPHOS 66   BILITOT 6.5*   PROT 4.5*   ALBUMIN 2.0*     Microbiology Results (last 7 days)       ** No results found for the last 168 hours. **          All labs within the past 24 hours have been reviewed.    Significant Imaging:  Labs: Reviewed  X-Ray: Reviewed  CT: Reviewed    Assessment/Plan:     WAGNER (acute kidney injury)  -Ischemic ATN 2/2 decrease perfusion severe hypotension in a patient with known CKD 3b  Baseline sCr: 1.9-2.3  On  admission his Cr was: 2.5  Lab Results   Component Value Date    CREATININE 3.6 (H) 07/15/2022     7/15 urine microscopy showed: many granular casts, muddy brown casts, waxy casts, some WBCS, some yeast, and occasional RBCs    Recommendations:   -increase lasix to 20mg/hr and add diuril 500mg x1, page nephrology if pt becomes severely oliguric  -Electrolytes: replace as necessary, renal diet, page nephrology if K >5.5   · For hyperK: may spangler lokelma    Phos level 6.8, start phos binders Sevelamer 800mg tid IF not NPO   Mg 2.4, goal >2  -Acid/base: AGMA 2/2 ATN  -Fluid balance: fluid overloaded   -Anemia: Hgb 8.7, send anemia panel labs, transfuse for Hgb <7.0  -Strict I/O's and daily weights  -Renal function panel and Mg levels Q8H   -Check uric acid, CK level, lactic acid, TSH, and urine labs (ordered)  -check Renal ultrasound   -Maintain MAP >65 for renal perfusion    There is no immediate indication for KRT at this time        Dilated cardiomyopathy  S/p ICD placement and 7/13 pt underwent HM 2 explantation, and implantation of HM 3 LVAD.    Heart replaced by heart assist device  7/13 pt underwent HM 2 explantation, and implantation of HM 3 LVAD  -Plan per primary team       Leukocytosis  Lab Results   Component Value Date    WBC 24.45 (H) 07/15/2022     -Plan per primary team       Chronic combined systolic and diastolic heart failure  Continue with lasix gtt, see WAGNER  -Plan per primary team         Although no immediate indications present for KRT given pt's critical condition, consent was obtained from pt's wife for KRT, and given to nurse to place in pt's chart          Thank you for your consult. I will follow-up with patient. Please contact us if you have any additional questions.    Holly Spangler MD  Nephrology  [facility]

## 2022-07-15 NOTE — PROGRESS NOTES
[facility]  Critical Care - Surgery  Progress Note    Patient Name: Tim Richards  MRN: [id number]  Admission Date: 6/27/2022  Hospital Length of Stay: 18 days  Code Status: Full Code  Attending Provider: Yg Kaufman MD  Primary Care Provider: Deyanira Booth MD   Principal Problem: Left ventricular assist device (LVAD) complication    Subjective:     Hospital/ICU Course:  No notes on file    Interval History/Significant Events: Back to OR for sternal washout yesterday. Slight increase in pressor requirements overnight. Flows on ECMO and LVAD stable. Good UOP, however positive 1.2 L for yesterday. CT output improved since washout.    Follow-up For: Procedure(s) (LRB):  CLOSURE, WOUND, STERNUM (N/A)  INSERTION-RIGHT VENTRICULAR ASSIST DEVICE (Right)    Post-Operative Day: Day of Surgery    Objective:     Vital Signs (Most Recent):  Temp: 98.06 °F (36.7 °C) (07/15/22 0525)  Pulse: 97 (07/15/22 0600)  Resp: 14 (07/14/22 2346)  BP: (!) 70/0 (07/15/22 0300)  SpO2: 99 % (07/14/22 2000)   Vital Signs (24h Range):  Temp:  [97.5 °F (36.4 °C)-98.42 °F (36.9 °C)] 98.06 °F (36.7 °C)  Pulse:  [] 97  Resp:  [12-14] 14  SpO2:  [85 %-99 %] 99 %  BP: (70-73)/(0-66) 70/0  Arterial Line BP: (69-88)/(60-73) 78/70     Weight: 124.7 kg (275 lb)  Body mass index is 36.29 kg/m².      Intake/Output Summary (Last 24 hours) at 7/15/2022 0644  Last data filed at 7/15/2022 0600  Gross per 24 hour   Intake 6401.24 ml   Output 5535 ml   Net 866.24 ml       Physical Exam  Constitutional:       Comments: Intubated and sedated   HENT:      Head: Normocephalic and atraumatic.      Mouth/Throat:      Comments: OG in place  Eyes:      Pupils: Pupils are equal, round, and reactive to light.   Neck:      Comments: L IJ CVC  R IJ trialysis  Cardiovascular:      Rate and Rhythm: Regular rhythm. Tachycardia present.      Comments: On V-A ECMO -- 3600 rpm, 3.0L  LVAD in place -- 5600 rpm, 4.7L    Chest is open. Covered  with ioban    2 plerual and 2 mediastinal chest tubes to suction. Clots beginning to form in the tubes.    V pacing wires in place.   Pulmonary:      Comments: Mechanically ventilated  Abdominal:      General: There is no distension.      Palpations: Abdomen is soft.   Genitourinary:     Comments: Lagunas in place draining clear urine  Musculoskeletal:      Comments: ECMO cannula x2 (right femoral artery, right femoral vein)    L brachial, right radial arterial line   Skin:     General: Skin is warm and dry.   Neurological:      Comments: Following commands when sedation paused       Vents:  Vent Mode: A/C (07/15/22 0525)  Ventilator Initiated: Yes (07/13/22 2148)  Set Rate: 14 BPM (07/15/22 0525)  Vt Set: 400 mL (07/15/22 0525)  PEEP/CPAP: 7 cmH20 (07/15/22 0525)  Oxygen Concentration (%): 60 (07/15/22 0600)  Peak Airway Pressure: 23 cmH2O (07/15/22 0525)  Plateau Pressure: 24 cmH20 (07/15/22 0525)  Total Ve: 6.45 mL (07/15/22 0525)  Negative Inspiratory Force (cm H2O): 0 (07/14/22 1528)  F/VT Ratio<105 (RSBI): (!) 34.15 (07/14/22 0803)    Lines/Drains/Airways       Central Venous Catheter Line  Duration                  ECMO Cannula 07/13/22 right femoral artery 2 days         ECMO Cannula 07/13/22 right femoral vein 2 days     Introducer with Double Lumen 07/13/22 0900 left internal jugular 1 day    Percutaneous Central Line Insertion/Assessment - Quad Lumen  07/13/22 0941 left internal jugular 1 day    Pulmonary Artery Catheter Assessment  07/13/22 0900 left internal jugular 1 day    Trialysis (Dialysis) Catheter 07/13/22 2100 right internal jugular 1 day              Drain  Duration                  Chest Tube 07/13/22 1916 1 Right Pleural 32 Fr. 1 day         Chest Tube 07/13/22 1916 2 Left Pleural 32 Fr. 1 day         Chest Tube 07/13/22 1916 3 Anterior Mediastinal 32 Fr. 1 day         Chest Tube 07/13/22 1916 4 Posterior Mediastinal 32 Fr. 1 day         Urethral Catheter 07/13/22 0848 Temperature probe;Latex  14 Fr. 1 day         NG/OG Tube 07/14/22 1230 Right mouth <1 day              Airway  Duration                  Airway - Non-Surgical 07/13/22 0848 1 day              Arterial Line  Duration             Arterial Line 07/13/22 0932 Left Brachial 1 day    Arterial Line 07/13/22 2000 Right Radial 1 day              Line  Duration                  VAD 03/08/18 1124 Left ventricular assist device HeartMate II 1589 days         VAD 07/13/22 1300 Left ventricular assist device HeartMate 3 1 day              Peripheral Intravenous Line  Duration                  Midline Catheter Insertion/Assessment  - Single Lumen 06/28/22 1027 Right cephalic vein (lateral side of arm) 20g x 8cm 16 days                    Significant Labs:    CBC/Anemia Profile:  Recent Labs   Lab 07/14/22  2105 07/15/22  0024 07/15/22  0026 07/15/22  0401 07/15/22  0402   WBC 20.54* 18.57*  --  22.61*  --    HGB 9.0* 8.6*  --  8.9*  --    HCT 26.7* 25.7* 24* 26.6* 24*   PLT 93* 75*  --  81*  --    MCV 86 89  --  88  --    RDW 15.7* 15.6*  --  15.6*  --         Chemistries:  Recent Labs   Lab 07/14/22  2105 07/15/22  0024 07/15/22  0401    140 143   K 5.3* 4.8 4.9    106 103   CO2 28 24 26   BUN 25* 30* 31*   CREATININE 2.8* 2.9* 3.5*   CALCIUM 7.8* 8.4* 8.7   ALBUMIN 2.1* 2.0* 2.2*  2.2*   PROT 4.0* 4.1* 4.4*  4.3*   BILITOT 4.9* 5.1* 5.7*  5.6*   ALKPHOS 53* 50* 62  56   * 159* 164*  162*   * 282* 287*  283*   MG 2.3 2.6 2.6   PHOS 5.5* 5.6* 6.1*       ABGs:   Recent Labs   Lab 07/15/22  0620   PH 7.410   PCO2 47.6*   HCO3 30.2*   POCSATURATED 85*   BE 6     Coagulation:   Recent Labs   Lab 07/15/22  0401   INR 1.4*   APTT 32.6*     Lactic Acid:   Recent Labs   Lab 07/13/22  2149   LACTATE >12.0*       Significant Imaging:  I have reviewed all pertinent imaging results/findings within the past 24 hours.    Assessment/Plan:     Chronic combined systolic and diastolic heart failure  Tim Richards is a 56 y.o. male HFrEF  with an EF of 10% and a history of HM2 LVAD in 2018 who is now s/p HM3 upgrade, initiation of V-A ECMO after failure to wean off bypass x2      Neuro/Psych:   -- Sedation: Propofol.  -- Pain: PRN Dilaudid             Cards:   -- S/P LVAD exchange on 7/14/22  -- Requiring high dose pressors:   Epi 0.1   Levo 0.04   Vaso 0.04   Dopamine 3   Giapreza 60  -- Stress dose steroids  -- Ventricular pacing wires. Paced at 80 BPM  -- MAP goal >65  -- VAD and ECMO flows stable  -- Plan to return to the OR today for sternal closure      Pulm:   -- Goal O2 sat > 90%  -- ABG PRN  -- Mediastinal chest tubes x2 and pleural chest tube x2 to suction  -- Nitric at 15 ppm      Renal:  -- Keep sun for strict I/O  -- Trend BUN/Cr 31/3.5 <-- 20/2.4  -- Lasix drip at 10, received diuril and diamox overnight  -- Goal negative 1-2L      FEN / GI:   -- Replace lytes as needed  -- Nutrition: NPO  -- GI ppx: famotidine      ID:   -- Tm: afebrile; WBC increasing  -- cefepime      Heme/Onc:   -- H/H stable   -- Daily CBC  -- Received 18 pRBCs, 16 FFP, 2 plt, 25 cryo; 1500 albumin intra-op  -- Coags beginning to normalize      Endo:   -- BG goal 140-180  -- Insulin gtt      PPx:   Feeding: NPO  Analgesia/Sedation: Propofol / PRN Dilaudid  Thromboembolic prevention: SCDs  HOB >30: Yes  Stress Ulcer ppx: famotidine  Glucose control: Critical care goal 140-180 g/dl, ISS     Lines/Drains/Airway: ETT; OG; ECMO canula x2; RIJ trialysis, LIJ CVC and gertrude, r-radial a-line, Chest tube x4; sun; WV, VAD; midline      Dispo/Code Status/Palliative:   -- SICU / Full Code.   -- Plan to return to the OR today           Dang Amezcua MD  Critical Care - Surgery  [facility]

## 2022-07-16 LAB
ABO + RH BLD: NORMAL
ALBUMIN SERPL BCP-MCNC: 2.1 G/DL (ref 3.5–5.2)
ALP SERPL-CCNC: 77 U/L (ref 55–135)
ALP SERPL-CCNC: 82 U/L (ref 55–135)
ALP SERPL-CCNC: 90 U/L (ref 55–135)
ALP SERPL-CCNC: 90 U/L (ref 55–135)
ALT SERPL W/O P-5'-P-CCNC: 109 U/L (ref 10–44)
ALT SERPL W/O P-5'-P-CCNC: 82 U/L (ref 10–44)
ALT SERPL W/O P-5'-P-CCNC: 84 U/L (ref 10–44)
ALT SERPL W/O P-5'-P-CCNC: 95 U/L (ref 10–44)
ANION GAP SERPL CALC-SCNC: 11 MMOL/L (ref 8–16)
ANION GAP SERPL CALC-SCNC: 12 MMOL/L (ref 8–16)
ANION GAP SERPL CALC-SCNC: 14 MMOL/L (ref 8–16)
ANISOCYTOSIS BLD QL SMEAR: SLIGHT
APTT BLDCRRT: 32.2 SEC (ref 21–32)
APTT BLDCRRT: 32.8 SEC (ref 21–32)
APTT BLDCRRT: 33 SEC (ref 21–32)
APTT BLDCRRT: 33 SEC (ref 21–32)
APTT BLDCRRT: 33.4 SEC (ref 21–32)
AST SERPL-CCNC: 175 U/L (ref 10–40)
AST SERPL-CCNC: 181 U/L (ref 10–40)
AST SERPL-CCNC: 191 U/L (ref 10–40)
AST SERPL-CCNC: 205 U/L (ref 10–40)
BASO STIPL BLD QL SMEAR: ABNORMAL
BASOPHILS # BLD AUTO: 0.02 K/UL (ref 0–0.2)
BASOPHILS # BLD AUTO: 0.03 K/UL (ref 0–0.2)
BASOPHILS # BLD AUTO: 0.03 K/UL (ref 0–0.2)
BASOPHILS # BLD AUTO: ABNORMAL K/UL (ref 0–0.2)
BASOPHILS NFR BLD: 0 % (ref 0–1.9)
BASOPHILS NFR BLD: 0 % (ref 0–1.9)
BASOPHILS NFR BLD: 0.1 % (ref 0–1.9)
BILIRUB SERPL-MCNC: 7.7 MG/DL (ref 0.1–1)
BILIRUB SERPL-MCNC: 8 MG/DL (ref 0.1–1)
BILIRUB SERPL-MCNC: 8.3 MG/DL (ref 0.1–1)
BILIRUB SERPL-MCNC: 8.9 MG/DL (ref 0.1–1)
BLD GP AB SCN CELLS X3 SERPL QL: NORMAL
BLD PROD TYP BPU: NORMAL
BLOOD UNIT EXPIRATION DATE: NORMAL
BLOOD UNIT TYPE CODE: 5100
BLOOD UNIT TYPE CODE: 9500
BLOOD UNIT TYPE: NORMAL
BUN SERPL-MCNC: 51 MG/DL (ref 6–20)
BUN SERPL-MCNC: 55 MG/DL (ref 6–20)
BUN SERPL-MCNC: 64 MG/DL (ref 6–20)
BUN SERPL-MCNC: 69 MG/DL (ref 6–20)
BUN SERPL-MCNC: 72 MG/DL (ref 6–20)
BURR CELLS BLD QL SMEAR: ABNORMAL
CALCIUM SERPL-MCNC: 8.1 MG/DL (ref 8.7–10.5)
CALCIUM SERPL-MCNC: 8.5 MG/DL (ref 8.7–10.5)
CALCIUM SERPL-MCNC: 8.5 MG/DL (ref 8.7–10.5)
CALCIUM SERPL-MCNC: 8.8 MG/DL (ref 8.7–10.5)
CALCIUM SERPL-MCNC: 8.8 MG/DL (ref 8.7–10.5)
CHLORIDE SERPL-SCNC: 102 MMOL/L (ref 95–110)
CHLORIDE SERPL-SCNC: 103 MMOL/L (ref 95–110)
CHLORIDE SERPL-SCNC: 103 MMOL/L (ref 95–110)
CHLORIDE SERPL-SCNC: 104 MMOL/L (ref 95–110)
CHLORIDE SERPL-SCNC: 106 MMOL/L (ref 95–110)
CHLORIDE UR-SCNC: 84 MMOL/L (ref 25–200)
CO2 SERPL-SCNC: 26 MMOL/L (ref 23–29)
CO2 SERPL-SCNC: 26 MMOL/L (ref 23–29)
CO2 SERPL-SCNC: 27 MMOL/L (ref 23–29)
CODING SYSTEM: NORMAL
CREAT SERPL-MCNC: 3.7 MG/DL (ref 0.5–1.4)
CREAT SERPL-MCNC: 4.2 MG/DL (ref 0.5–1.4)
CREAT SERPL-MCNC: 4.5 MG/DL (ref 0.5–1.4)
CREAT UR-MCNC: 38 MG/DL (ref 23–375)
CREAT UR-MCNC: 38 MG/DL (ref 23–375)
DIFFERENTIAL METHOD: ABNORMAL
DISPENSE STATUS: NORMAL
DOHLE BOD BLD QL SMEAR: PRESENT
DOHLE BOD BLD QL SMEAR: PRESENT
EOSINOPHIL # BLD AUTO: 0 K/UL (ref 0–0.5)
EOSINOPHIL # BLD AUTO: ABNORMAL K/UL (ref 0–0.5)
EOSINOPHIL NFR BLD: 0 % (ref 0–8)
ERYTHROCYTE [DISTWIDTH] IN BLOOD BY AUTOMATED COUNT: 15.6 % (ref 11.5–14.5)
ERYTHROCYTE [DISTWIDTH] IN BLOOD BY AUTOMATED COUNT: 15.7 % (ref 11.5–14.5)
ERYTHROCYTE [DISTWIDTH] IN BLOOD BY AUTOMATED COUNT: 15.8 % (ref 11.5–14.5)
ERYTHROCYTE [DISTWIDTH] IN BLOOD BY AUTOMATED COUNT: 15.9 % (ref 11.5–14.5)
ERYTHROCYTE [DISTWIDTH] IN BLOOD BY AUTOMATED COUNT: 15.9 % (ref 11.5–14.5)
EST. GFR  (AFRICAN AMERICAN): 15.8 ML/MIN/1.73 M^2
EST. GFR  (AFRICAN AMERICAN): 17.2 ML/MIN/1.73 M^2
EST. GFR  (AFRICAN AMERICAN): 20.1 ML/MIN/1.73 M^2
EST. GFR  (NON AFRICAN AMERICAN): 13.7 ML/MIN/1.73 M^2
EST. GFR  (NON AFRICAN AMERICAN): 14.9 ML/MIN/1.73 M^2
EST. GFR  (NON AFRICAN AMERICAN): 17.3 ML/MIN/1.73 M^2
GLUCOSE SERPL-MCNC: 138 MG/DL (ref 70–110)
GLUCOSE SERPL-MCNC: 142 MG/DL (ref 70–110)
GLUCOSE SERPL-MCNC: 142 MG/DL (ref 70–110)
GLUCOSE SERPL-MCNC: 145 MG/DL (ref 70–110)
GLUCOSE SERPL-MCNC: 147 MG/DL (ref 70–110)
HCO3 UR-SCNC: 27.1 MMOL/L (ref 24–28)
HCO3 UR-SCNC: 28.7 MMOL/L (ref 24–28)
HCO3 UR-SCNC: 29.5 MMOL/L (ref 24–28)
HCO3 UR-SCNC: 29.5 MMOL/L (ref 24–28)
HCO3 UR-SCNC: 29.8 MMOL/L (ref 24–28)
HCO3 UR-SCNC: 30.4 MMOL/L (ref 24–28)
HCO3 UR-SCNC: 30.4 MMOL/L (ref 24–28)
HCO3 UR-SCNC: 30.5 MMOL/L (ref 24–28)
HCO3 UR-SCNC: 31 MMOL/L (ref 24–28)
HCO3 UR-SCNC: 31 MMOL/L (ref 24–28)
HCO3 UR-SCNC: 33.1 MMOL/L (ref 24–28)
HCO3 UR-SCNC: 33.4 MMOL/L (ref 24–28)
HCT VFR BLD AUTO: 23.9 % (ref 40–54)
HCT VFR BLD AUTO: 24.4 % (ref 40–54)
HCT VFR BLD AUTO: 24.7 % (ref 40–54)
HCT VFR BLD AUTO: 24.8 % (ref 40–54)
HCT VFR BLD AUTO: 24.9 % (ref 40–54)
HCT VFR BLD CALC: 21 %PCV (ref 36–54)
HCT VFR BLD CALC: 22 %PCV (ref 36–54)
HCT VFR BLD CALC: 26 %PCV (ref 36–54)
HCT VFR BLD CALC: 34 %PCV (ref 36–54)
HGB BLD-MCNC: 7.9 G/DL (ref 14–18)
HGB BLD-MCNC: 8 G/DL (ref 14–18)
HGB BLD-MCNC: 8.2 G/DL (ref 14–18)
HGB BLD-MCNC: 8.2 G/DL (ref 14–18)
HGB BLD-MCNC: 8.3 G/DL (ref 14–18)
HYPOCHROMIA BLD QL SMEAR: ABNORMAL
IMM GRANULOCYTES # BLD AUTO: 0.16 K/UL (ref 0–0.04)
IMM GRANULOCYTES # BLD AUTO: 0.21 K/UL (ref 0–0.04)
IMM GRANULOCYTES # BLD AUTO: 0.28 K/UL (ref 0–0.04)
IMM GRANULOCYTES # BLD AUTO: ABNORMAL K/UL (ref 0–0.04)
IMM GRANULOCYTES # BLD AUTO: ABNORMAL K/UL (ref 0–0.04)
IMM GRANULOCYTES NFR BLD AUTO: 0.6 % (ref 0–0.5)
IMM GRANULOCYTES NFR BLD AUTO: 0.8 % (ref 0–0.5)
IMM GRANULOCYTES NFR BLD AUTO: 1.1 % (ref 0–0.5)
IMM GRANULOCYTES NFR BLD AUTO: ABNORMAL % (ref 0–0.5)
IMM GRANULOCYTES NFR BLD AUTO: ABNORMAL % (ref 0–0.5)
INR PPP: 1.1 (ref 0.8–1.2)
INR PPP: 1.1 (ref 0.8–1.2)
INR PPP: 1.2 (ref 0.8–1.2)
INR PPP: 1.2 (ref 0.8–1.2)
INR PPP: 1.3 (ref 0.8–1.2)
LDH SERPL L TO P-CCNC: 0.88 MMOL/L (ref 0.36–1.25)
LDH SERPL L TO P-CCNC: 0.94 MMOL/L (ref 0.36–1.25)
LDH SERPL L TO P-CCNC: 1.01 MMOL/L (ref 0.36–1.25)
LDH SERPL L TO P-CCNC: 1.02 MMOL/L (ref 0.36–1.25)
LDH SERPL L TO P-CCNC: 1.08 MMOL/L (ref 0.36–1.25)
LDH SERPL L TO P-CCNC: 1.13 MMOL/L (ref 0.36–1.25)
LYMPHOCYTES # BLD AUTO: 0.4 K/UL (ref 1–4.8)
LYMPHOCYTES # BLD AUTO: 0.5 K/UL (ref 1–4.8)
LYMPHOCYTES # BLD AUTO: 0.5 K/UL (ref 1–4.8)
LYMPHOCYTES # BLD AUTO: ABNORMAL K/UL (ref 1–4.8)
LYMPHOCYTES NFR BLD: 1 % (ref 18–48)
LYMPHOCYTES NFR BLD: 1.5 % (ref 18–48)
LYMPHOCYTES NFR BLD: 1.8 % (ref 18–48)
LYMPHOCYTES NFR BLD: 1.9 % (ref 18–48)
LYMPHOCYTES NFR BLD: 3 % (ref 18–48)
MAGNESIUM SERPL-MCNC: 2.3 MG/DL (ref 1.6–2.6)
MAGNESIUM SERPL-MCNC: 2.4 MG/DL (ref 1.6–2.6)
MAGNESIUM SERPL-MCNC: 2.6 MG/DL (ref 1.6–2.6)
MCH RBC QN AUTO: 28.4 PG (ref 27–31)
MCH RBC QN AUTO: 28.7 PG (ref 27–31)
MCH RBC QN AUTO: 29 PG (ref 27–31)
MCH RBC QN AUTO: 29.5 PG (ref 27–31)
MCH RBC QN AUTO: 29.7 PG (ref 27–31)
MCHC RBC AUTO-ENTMCNC: 32.4 G/DL (ref 32–36)
MCHC RBC AUTO-ENTMCNC: 32.9 G/DL (ref 32–36)
MCHC RBC AUTO-ENTMCNC: 33.1 G/DL (ref 32–36)
MCHC RBC AUTO-ENTMCNC: 33.1 G/DL (ref 32–36)
MCHC RBC AUTO-ENTMCNC: 34 G/DL (ref 32–36)
MCV RBC AUTO: 87 FL (ref 82–98)
MCV RBC AUTO: 88 FL (ref 82–98)
MCV RBC AUTO: 89 FL (ref 82–98)
METHEMOGLOBIN: 0.4 % (ref 0–3)
MONOCYTES # BLD AUTO: 1.3 K/UL (ref 0.3–1)
MONOCYTES # BLD AUTO: 1.4 K/UL (ref 0.3–1)
MONOCYTES # BLD AUTO: 1.4 K/UL (ref 0.3–1)
MONOCYTES # BLD AUTO: ABNORMAL K/UL (ref 0.3–1)
MONOCYTES NFR BLD: 3 % (ref 4–15)
MONOCYTES NFR BLD: 3 % (ref 4–15)
MONOCYTES NFR BLD: 5.2 % (ref 4–15)
MONOCYTES NFR BLD: 5.4 % (ref 4–15)
MONOCYTES NFR BLD: 5.5 % (ref 4–15)
NEUTROPHILS # BLD AUTO: 23.1 K/UL (ref 1.8–7.7)
NEUTROPHILS # BLD AUTO: 23.2 K/UL (ref 1.8–7.7)
NEUTROPHILS # BLD AUTO: 24.4 K/UL (ref 1.8–7.7)
NEUTROPHILS NFR BLD: 91 % (ref 38–73)
NEUTROPHILS NFR BLD: 91.8 % (ref 38–73)
NEUTROPHILS NFR BLD: 91.9 % (ref 38–73)
NEUTROPHILS NFR BLD: 92.2 % (ref 38–73)
NEUTROPHILS NFR BLD: 95 % (ref 38–73)
NEUTS BAND NFR BLD MANUAL: 1 %
NEUTS BAND NFR BLD MANUAL: 3 %
NRBC BLD-RTO: 0 /100 WBC
NUM UNITS TRANS FFP: NORMAL
NUM UNITS TRANS PACKED RBC: NORMAL
OSMOLALITY UR: 329 MOSM/KG (ref 50–1200)
OVALOCYTES BLD QL SMEAR: ABNORMAL
PCO2 BLDA: 42.5 MMHG (ref 35–45)
PCO2 BLDA: 43.2 MMHG (ref 35–45)
PCO2 BLDA: 43.9 MMHG (ref 35–45)
PCO2 BLDA: 44 MMHG (ref 35–45)
PCO2 BLDA: 44 MMHG (ref 35–45)
PCO2 BLDA: 44.4 MMHG (ref 35–45)
PCO2 BLDA: 45.2 MMHG (ref 35–45)
PCO2 BLDA: 45.6 MMHG (ref 35–45)
PCO2 BLDA: 45.8 MMHG (ref 35–45)
PCO2 BLDA: 46 MMHG (ref 35–45)
PCO2 BLDA: 46.7 MMHG (ref 35–45)
PCO2 BLDA: 47.4 MMHG (ref 35–45)
PH SMN: 7.4 [PH] (ref 7.35–7.45)
PH SMN: 7.41 [PH] (ref 7.35–7.45)
PH SMN: 7.42 [PH] (ref 7.35–7.45)
PH SMN: 7.42 [PH] (ref 7.35–7.45)
PH SMN: 7.43 [PH] (ref 7.35–7.45)
PH SMN: 7.43 [PH] (ref 7.35–7.45)
PH SMN: 7.44 [PH] (ref 7.35–7.45)
PH SMN: 7.45 [PH] (ref 7.35–7.45)
PH SMN: 7.47 [PH] (ref 7.35–7.45)
PH SMN: 7.49 [PH] (ref 7.35–7.45)
PHOSPHATE SERPL-MCNC: 6.6 MG/DL (ref 2.7–4.5)
PHOSPHATE SERPL-MCNC: 6.6 MG/DL (ref 2.7–4.5)
PHOSPHATE SERPL-MCNC: 6.7 MG/DL (ref 2.7–4.5)
PHOSPHATE SERPL-MCNC: 6.9 MG/DL (ref 2.7–4.5)
PHOSPHATE SERPL-MCNC: 7.1 MG/DL (ref 2.7–4.5)
PLATELET # BLD AUTO: 68 K/UL (ref 150–450)
PLATELET # BLD AUTO: 73 K/UL (ref 150–450)
PLATELET # BLD AUTO: 80 K/UL (ref 150–450)
PLATELET # BLD AUTO: 81 K/UL (ref 150–450)
PLATELET # BLD AUTO: 81 K/UL (ref 150–450)
PLATELET BLD QL SMEAR: ABNORMAL
PMV BLD AUTO: 11.9 FL (ref 9.2–12.9)
PMV BLD AUTO: 12 FL (ref 9.2–12.9)
PMV BLD AUTO: 12.1 FL (ref 9.2–12.9)
PO2 BLDA: 107 MMHG (ref 80–100)
PO2 BLDA: 110 MMHG (ref 80–100)
PO2 BLDA: 112 MMHG (ref 80–100)
PO2 BLDA: 119 MMHG (ref 80–100)
PO2 BLDA: 121 MMHG (ref 80–100)
PO2 BLDA: 122 MMHG (ref 80–100)
PO2 BLDA: 432 MMHG (ref 80–100)
PO2 BLDA: 44 MMHG (ref 40–60)
PO2 BLDA: 477 MMHG (ref 80–100)
PO2 BLDA: 48 MMHG (ref 40–60)
PO2 BLDA: 50 MMHG (ref 40–60)
PO2 BLDA: 535 MMHG (ref 80–100)
POC BE: 10 MMOL/L
POC BE: 10 MMOL/L
POC BE: 2 MMOL/L
POC BE: 4 MMOL/L
POC BE: 5 MMOL/L
POC BE: 5 MMOL/L
POC BE: 6 MMOL/L
POC BE: 7 MMOL/L
POC IONIZED CALCIUM: 1.04 MMOL/L (ref 1.06–1.42)
POC IONIZED CALCIUM: 1.07 MMOL/L (ref 1.06–1.42)
POC IONIZED CALCIUM: 1.08 MMOL/L (ref 1.06–1.42)
POC IONIZED CALCIUM: 1.08 MMOL/L (ref 1.06–1.42)
POC IONIZED CALCIUM: 1.09 MMOL/L (ref 1.06–1.42)
POC IONIZED CALCIUM: 1.11 MMOL/L (ref 1.06–1.42)
POC SATURATED O2: 100 % (ref 95–100)
POC SATURATED O2: 80 % (ref 95–100)
POC SATURATED O2: 83 % (ref 95–100)
POC SATURATED O2: 85 % (ref 95–100)
POC SATURATED O2: 98 % (ref 95–100)
POC SATURATED O2: 99 % (ref 95–100)
POC TCO2: 28 MMOL/L (ref 24–29)
POC TCO2: 30 MMOL/L (ref 24–29)
POC TCO2: 31 MMOL/L (ref 23–27)
POC TCO2: 32 MMOL/L (ref 23–27)
POC TCO2: 32 MMOL/L (ref 24–29)
POC TCO2: 34 MMOL/L (ref 23–27)
POC TCO2: 35 MMOL/L (ref 23–27)
POCT GLUCOSE: 100 MG/DL (ref 70–110)
POCT GLUCOSE: 109 MG/DL (ref 70–110)
POCT GLUCOSE: 109 MG/DL (ref 70–110)
POCT GLUCOSE: 116 MG/DL (ref 70–110)
POCT GLUCOSE: 119 MG/DL (ref 70–110)
POCT GLUCOSE: 121 MG/DL (ref 70–110)
POCT GLUCOSE: 122 MG/DL (ref 70–110)
POCT GLUCOSE: 123 MG/DL (ref 70–110)
POCT GLUCOSE: 125 MG/DL (ref 70–110)
POCT GLUCOSE: 127 MG/DL (ref 70–110)
POCT GLUCOSE: 131 MG/DL (ref 70–110)
POCT GLUCOSE: 134 MG/DL (ref 70–110)
POCT GLUCOSE: 137 MG/DL (ref 70–110)
POCT GLUCOSE: 138 MG/DL (ref 70–110)
POCT GLUCOSE: 142 MG/DL (ref 70–110)
POCT GLUCOSE: 142 MG/DL (ref 70–110)
POCT GLUCOSE: 143 MG/DL (ref 70–110)
POCT GLUCOSE: 144 MG/DL (ref 70–110)
POCT GLUCOSE: 144 MG/DL (ref 70–110)
POCT GLUCOSE: 149 MG/DL (ref 70–110)
POIKILOCYTOSIS BLD QL SMEAR: SLIGHT
POLYCHROMASIA BLD QL SMEAR: ABNORMAL
POTASSIUM BLD-SCNC: 3.7 MMOL/L (ref 3.5–5.1)
POTASSIUM BLD-SCNC: 3.8 MMOL/L (ref 3.5–5.1)
POTASSIUM BLD-SCNC: 4.1 MMOL/L (ref 3.5–5.1)
POTASSIUM BLD-SCNC: 4.1 MMOL/L (ref 3.5–5.1)
POTASSIUM BLD-SCNC: 4.2 MMOL/L (ref 3.5–5.1)
POTASSIUM BLD-SCNC: 4.3 MMOL/L (ref 3.5–5.1)
POTASSIUM SERPL-SCNC: 3.9 MMOL/L (ref 3.5–5.1)
POTASSIUM SERPL-SCNC: 4 MMOL/L (ref 3.5–5.1)
POTASSIUM SERPL-SCNC: 4.3 MMOL/L (ref 3.5–5.1)
POTASSIUM SERPL-SCNC: 4.3 MMOL/L (ref 3.5–5.1)
POTASSIUM SERPL-SCNC: 4.5 MMOL/L (ref 3.5–5.1)
POTASSIUM UR-SCNC: 66 MMOL/L (ref 15–95)
PROT SERPL-MCNC: 4.7 G/DL (ref 6–8.4)
PROT SERPL-MCNC: 4.9 G/DL (ref 6–8.4)
PROT SERPL-MCNC: 4.9 G/DL (ref 6–8.4)
PROT SERPL-MCNC: 5 G/DL (ref 6–8.4)
PROT UR-MCNC: 24 MG/DL (ref 0–15)
PROT/CREAT UR: 0.63 MG/G{CREAT} (ref 0–0.2)
PROTHROMBIN TIME: 11.5 SEC (ref 9–12.5)
PROTHROMBIN TIME: 11.6 SEC (ref 9–12.5)
PROTHROMBIN TIME: 11.9 SEC (ref 9–12.5)
PROTHROMBIN TIME: 12.2 SEC (ref 9–12.5)
PROTHROMBIN TIME: 12.9 SEC (ref 9–12.5)
RBC # BLD AUTO: 2.75 M/UL (ref 4.6–6.2)
RBC # BLD AUTO: 2.78 M/UL (ref 4.6–6.2)
RBC # BLD AUTO: 2.79 M/UL (ref 4.6–6.2)
RBC # BLD AUTO: 2.82 M/UL (ref 4.6–6.2)
RBC # BLD AUTO: 2.83 M/UL (ref 4.6–6.2)
SAMPLE: ABNORMAL
SAMPLE: NORMAL
SCHISTOCYTES BLD QL SMEAR: ABNORMAL
SCHISTOCYTES BLD QL SMEAR: ABNORMAL
SODIUM BLD-SCNC: 143 MMOL/L (ref 136–145)
SODIUM BLD-SCNC: 144 MMOL/L (ref 136–145)
SODIUM BLD-SCNC: 144 MMOL/L (ref 136–145)
SODIUM SERPL-SCNC: 141 MMOL/L (ref 136–145)
SODIUM SERPL-SCNC: 142 MMOL/L (ref 136–145)
SODIUM SERPL-SCNC: 143 MMOL/L (ref 136–145)
SODIUM UR-SCNC: 59 MMOL/L (ref 20–250)
TARGETS BLD QL SMEAR: ABNORMAL
TOXIC GRANULES BLD QL SMEAR: PRESENT
TOXIC GRANULES BLD QL SMEAR: PRESENT
UUN UR-MCNC: 191 MG/DL (ref 140–1050)
WBC # BLD AUTO: 22.61 K/UL (ref 3.9–12.7)
WBC # BLD AUTO: 22.93 K/UL (ref 3.9–12.7)
WBC # BLD AUTO: 25.15 K/UL (ref 3.9–12.7)
WBC # BLD AUTO: 25.17 K/UL (ref 3.9–12.7)
WBC # BLD AUTO: 26.52 K/UL (ref 3.9–12.7)
WBC TOXIC VACUOLES BLD QL SMEAR: PRESENT

## 2022-07-16 PROCEDURE — 84132 ASSAY OF SERUM POTASSIUM: CPT

## 2022-07-16 PROCEDURE — 37799 UNLISTED PX VASCULAR SURGERY: CPT

## 2022-07-16 PROCEDURE — 80053 COMPREHEN METABOLIC PANEL: CPT | Performed by: STUDENT IN AN ORGANIZED HEALTH CARE EDUCATION/TRAINING PROGRAM

## 2022-07-16 PROCEDURE — 80053 COMPREHEN METABOLIC PANEL: CPT | Mod: 91 | Performed by: STUDENT IN AN ORGANIZED HEALTH CARE EDUCATION/TRAINING PROGRAM

## 2022-07-16 PROCEDURE — 85027 COMPLETE CBC AUTOMATED: CPT | Performed by: STUDENT IN AN ORGANIZED HEALTH CARE EDUCATION/TRAINING PROGRAM

## 2022-07-16 PROCEDURE — 99233 PR SUBSEQUENT HOSPITAL CARE,LEVL III: ICD-10-PCS | Mod: ,,, | Performed by: INTERNAL MEDICINE

## 2022-07-16 PROCEDURE — 25000003 PHARM REV CODE 250

## 2022-07-16 PROCEDURE — 63600367 HC NITRIC OXIDE PER HOUR

## 2022-07-16 PROCEDURE — 82565 ASSAY OF CREATININE: CPT

## 2022-07-16 PROCEDURE — 99233 SBSQ HOSP IP/OBS HIGH 50: CPT | Mod: ,,, | Performed by: INTERNAL MEDICINE

## 2022-07-16 PROCEDURE — 85007 BL SMEAR W/DIFF WBC COUNT: CPT | Performed by: STUDENT IN AN ORGANIZED HEALTH CARE EDUCATION/TRAINING PROGRAM

## 2022-07-16 PROCEDURE — 27000221 HC OXYGEN, UP TO 24 HOURS

## 2022-07-16 PROCEDURE — 85025 COMPLETE CBC W/AUTO DIFF WBC: CPT | Performed by: STUDENT IN AN ORGANIZED HEALTH CARE EDUCATION/TRAINING PROGRAM

## 2022-07-16 PROCEDURE — 82570 ASSAY OF URINE CREATININE: CPT | Performed by: STUDENT IN AN ORGANIZED HEALTH CARE EDUCATION/TRAINING PROGRAM

## 2022-07-16 PROCEDURE — 63600175 PHARM REV CODE 636 W HCPCS: Performed by: THORACIC SURGERY (CARDIOTHORACIC VASCULAR SURGERY)

## 2022-07-16 PROCEDURE — 63600175 PHARM REV CODE 636 W HCPCS

## 2022-07-16 PROCEDURE — 20000000 HC ICU ROOM

## 2022-07-16 PROCEDURE — 83605 ASSAY OF LACTIC ACID: CPT

## 2022-07-16 PROCEDURE — 27000248 HC VAD-ADDITIONAL DAY

## 2022-07-16 PROCEDURE — 83050 HGB METHEMOGLOBIN QUAN: CPT

## 2022-07-16 PROCEDURE — 83735 ASSAY OF MAGNESIUM: CPT | Performed by: THORACIC SURGERY (CARDIOTHORACIC VASCULAR SURGERY)

## 2022-07-16 PROCEDURE — 27201040 HC RC 50 FILTER: Performed by: STUDENT IN AN ORGANIZED HEALTH CARE EDUCATION/TRAINING PROGRAM

## 2022-07-16 PROCEDURE — 25000003 PHARM REV CODE 250: Performed by: STUDENT IN AN ORGANIZED HEALTH CARE EDUCATION/TRAINING PROGRAM

## 2022-07-16 PROCEDURE — 99291 PR CRITICAL CARE, E/M 30-74 MINUTES: ICD-10-PCS | Mod: ,,, | Performed by: ANESTHESIOLOGY

## 2022-07-16 PROCEDURE — 82330 ASSAY OF CALCIUM: CPT

## 2022-07-16 PROCEDURE — 99900026 HC AIRWAY MAINTENANCE (STAT)

## 2022-07-16 PROCEDURE — 83935 ASSAY OF URINE OSMOLALITY: CPT | Performed by: STUDENT IN AN ORGANIZED HEALTH CARE EDUCATION/TRAINING PROGRAM

## 2022-07-16 PROCEDURE — 82436 ASSAY OF URINE CHLORIDE: CPT | Performed by: STUDENT IN AN ORGANIZED HEALTH CARE EDUCATION/TRAINING PROGRAM

## 2022-07-16 PROCEDURE — 85730 THROMBOPLASTIN TIME PARTIAL: CPT | Performed by: STUDENT IN AN ORGANIZED HEALTH CARE EDUCATION/TRAINING PROGRAM

## 2022-07-16 PROCEDURE — 33949 ECMO/ECLS DAILY MGMT ARTERY: CPT

## 2022-07-16 PROCEDURE — 84100 ASSAY OF PHOSPHORUS: CPT | Performed by: THORACIC SURGERY (CARDIOTHORACIC VASCULAR SURGERY)

## 2022-07-16 PROCEDURE — 25000003 PHARM REV CODE 250: Performed by: THORACIC SURGERY (CARDIOTHORACIC VASCULAR SURGERY)

## 2022-07-16 PROCEDURE — 85610 PROTHROMBIN TIME: CPT | Performed by: STUDENT IN AN ORGANIZED HEALTH CARE EDUCATION/TRAINING PROGRAM

## 2022-07-16 PROCEDURE — 86920 COMPATIBILITY TEST SPIN: CPT

## 2022-07-16 PROCEDURE — 94003 VENT MGMT INPAT SUBQ DAY: CPT

## 2022-07-16 PROCEDURE — 84133 ASSAY OF URINE POTASSIUM: CPT | Performed by: STUDENT IN AN ORGANIZED HEALTH CARE EDUCATION/TRAINING PROGRAM

## 2022-07-16 PROCEDURE — 82803 BLOOD GASES ANY COMBINATION: CPT

## 2022-07-16 PROCEDURE — 36592 COLLECT BLOOD FROM PICC: CPT

## 2022-07-16 PROCEDURE — 85014 HEMATOCRIT: CPT

## 2022-07-16 PROCEDURE — 84100 ASSAY OF PHOSPHORUS: CPT | Mod: 91 | Performed by: STUDENT IN AN ORGANIZED HEALTH CARE EDUCATION/TRAINING PROGRAM

## 2022-07-16 PROCEDURE — 84300 ASSAY OF URINE SODIUM: CPT | Performed by: STUDENT IN AN ORGANIZED HEALTH CARE EDUCATION/TRAINING PROGRAM

## 2022-07-16 PROCEDURE — 84540 ASSAY OF URINE/UREA-N: CPT | Performed by: STUDENT IN AN ORGANIZED HEALTH CARE EDUCATION/TRAINING PROGRAM

## 2022-07-16 PROCEDURE — 94761 N-INVAS EAR/PLS OXIMETRY MLT: CPT

## 2022-07-16 PROCEDURE — 86850 RBC ANTIBODY SCREEN: CPT | Performed by: STUDENT IN AN ORGANIZED HEALTH CARE EDUCATION/TRAINING PROGRAM

## 2022-07-16 PROCEDURE — 84295 ASSAY OF SERUM SODIUM: CPT

## 2022-07-16 PROCEDURE — 63600175 PHARM REV CODE 636 W HCPCS: Performed by: STUDENT IN AN ORGANIZED HEALTH CARE EDUCATION/TRAINING PROGRAM

## 2022-07-16 PROCEDURE — 99900035 HC TECH TIME PER 15 MIN (STAT)

## 2022-07-16 PROCEDURE — 83735 ASSAY OF MAGNESIUM: CPT | Mod: 91 | Performed by: STUDENT IN AN ORGANIZED HEALTH CARE EDUCATION/TRAINING PROGRAM

## 2022-07-16 PROCEDURE — 85730 THROMBOPLASTIN TIME PARTIAL: CPT | Mod: 91 | Performed by: STUDENT IN AN ORGANIZED HEALTH CARE EDUCATION/TRAINING PROGRAM

## 2022-07-16 PROCEDURE — 99291 CRITICAL CARE FIRST HOUR: CPT | Mod: ,,, | Performed by: ANESTHESIOLOGY

## 2022-07-16 PROCEDURE — C9113 INJ PANTOPRAZOLE SODIUM, VIA: HCPCS

## 2022-07-16 PROCEDURE — 85610 PROTHROMBIN TIME: CPT | Mod: 91 | Performed by: STUDENT IN AN ORGANIZED HEALTH CARE EDUCATION/TRAINING PROGRAM

## 2022-07-16 RX ORDER — POTASSIUM CHLORIDE 29.8 MG/ML
40 INJECTION INTRAVENOUS ONCE
Status: COMPLETED | OUTPATIENT
Start: 2022-07-16 | End: 2022-07-17

## 2022-07-16 RX ORDER — CALCIUM GLUCONATE 20 MG/ML
1 INJECTION, SOLUTION INTRAVENOUS
Status: COMPLETED | OUTPATIENT
Start: 2022-07-16 | End: 2022-07-16

## 2022-07-16 RX ORDER — HEPARIN SODIUM 10000 [USP'U]/100ML
400 INJECTION, SOLUTION INTRAVENOUS CONTINUOUS
Status: DISCONTINUED | OUTPATIENT
Start: 2022-07-16 | End: 2022-07-16

## 2022-07-16 RX ORDER — HYDROMORPHONE HYDROCHLORIDE 1 MG/ML
0.5 INJECTION, SOLUTION INTRAMUSCULAR; INTRAVENOUS; SUBCUTANEOUS ONCE
Status: COMPLETED | OUTPATIENT
Start: 2022-07-16 | End: 2022-07-16

## 2022-07-16 RX ORDER — HEPARIN SODIUM 10000 [USP'U]/100ML
600 INJECTION, SOLUTION INTRAVENOUS CONTINUOUS
Status: DISCONTINUED | OUTPATIENT
Start: 2022-07-16 | End: 2022-07-18

## 2022-07-16 RX ORDER — HEPARIN SODIUM 10000 [USP'U]/100ML
200 INJECTION, SOLUTION INTRAVENOUS CONTINUOUS
Status: DISCONTINUED | OUTPATIENT
Start: 2022-07-16 | End: 2022-07-16

## 2022-07-16 RX ORDER — HEPARIN SODIUM 10000 [USP'U]/100ML
300 INJECTION, SOLUTION INTRAVENOUS CONTINUOUS
Status: DISCONTINUED | OUTPATIENT
Start: 2022-07-16 | End: 2022-07-16

## 2022-07-16 RX ADMIN — PANTOPRAZOLE SODIUM 40 MG: 40 INJECTION, POWDER, FOR SOLUTION INTRAVENOUS at 08:07

## 2022-07-16 RX ADMIN — HEPARIN SODIUM AND DEXTROSE 200 UNITS/HR: 10000; 5 INJECTION INTRAVENOUS at 02:07

## 2022-07-16 RX ADMIN — MUPIROCIN: 20 OINTMENT TOPICAL at 08:07

## 2022-07-16 RX ADMIN — DEXMEDETOMIDINE HYDROCHLORIDE 0.8 MCG/KG/HR: 4 INJECTION INTRAVENOUS at 04:07

## 2022-07-16 RX ADMIN — DEXMEDETOMIDINE HYDROCHLORIDE 1.1 MCG/KG/HR: 4 INJECTION INTRAVENOUS at 09:07

## 2022-07-16 RX ADMIN — HYDROCORTISONE SODIUM SUCCINATE 100 MG: 100 INJECTION, POWDER, FOR SOLUTION INTRAMUSCULAR; INTRAVENOUS at 05:07

## 2022-07-16 RX ADMIN — POTASSIUM CHLORIDE 40 MEQ: 29.8 INJECTION, SOLUTION INTRAVENOUS at 08:07

## 2022-07-16 RX ADMIN — CALCIUM GLUCONATE 1 G: 20 INJECTION, SOLUTION INTRAVENOUS at 08:07

## 2022-07-16 RX ADMIN — CALCIUM GLUCONATE 1 G: 20 INJECTION, SOLUTION INTRAVENOUS at 05:07

## 2022-07-16 RX ADMIN — FUROSEMIDE 10 MG/HR: 10 INJECTION, SOLUTION INTRAMUSCULAR; INTRAVENOUS at 05:07

## 2022-07-16 RX ADMIN — EPINEPHRINE 0.08 MCG/KG/MIN: 1 INJECTION INTRAMUSCULAR; INTRAVENOUS; SUBCUTANEOUS at 08:07

## 2022-07-16 RX ADMIN — SODIUM CHLORIDE: 0.9 INJECTION, SOLUTION INTRAVENOUS at 08:07

## 2022-07-16 RX ADMIN — DEXMEDETOMIDINE HYDROCHLORIDE 1 MCG/KG/HR: 4 INJECTION INTRAVENOUS at 11:07

## 2022-07-16 RX ADMIN — DEXMEDETOMIDINE HYDROCHLORIDE 1 MCG/KG/HR: 4 INJECTION INTRAVENOUS at 08:07

## 2022-07-16 RX ADMIN — HYDROMORPHONE HYDROCHLORIDE 0.5 MG: 1 INJECTION, SOLUTION INTRAMUSCULAR; INTRAVENOUS; SUBCUTANEOUS at 04:07

## 2022-07-16 RX ADMIN — CEFEPIME 2 G: 2 INJECTION, POWDER, FOR SOLUTION INTRAVENOUS at 08:07

## 2022-07-16 RX ADMIN — CALCIUM GLUCONATE 1 G: 20 INJECTION, SOLUTION INTRAVENOUS at 04:07

## 2022-07-16 RX ADMIN — HYDROCORTISONE SODIUM SUCCINATE 100 MG: 100 INJECTION, POWDER, FOR SOLUTION INTRAMUSCULAR; INTRAVENOUS at 02:07

## 2022-07-16 RX ADMIN — CALCIUM GLUCONATE 1 G: 20 INJECTION, SOLUTION INTRAVENOUS at 09:07

## 2022-07-16 RX ADMIN — VASOPRESSIN 0.04 UNITS/MIN: 20 INJECTION INTRAVENOUS at 05:07

## 2022-07-16 RX ADMIN — DEXMEDETOMIDINE HYDROCHLORIDE 1.1 MCG/KG/HR: 4 INJECTION INTRAVENOUS at 11:07

## 2022-07-16 RX ADMIN — HYDROMORPHONE HYDROCHLORIDE 1 MG: 1 INJECTION, SOLUTION INTRAMUSCULAR; INTRAVENOUS; SUBCUTANEOUS at 04:07

## 2022-07-16 RX ADMIN — DEXMEDETOMIDINE HYDROCHLORIDE 1.1 MCG/KG/HR: 4 INJECTION INTRAVENOUS at 03:07

## 2022-07-16 RX ADMIN — HYDROMORPHONE HYDROCHLORIDE 1 MG: 1 INJECTION, SOLUTION INTRAMUSCULAR; INTRAVENOUS; SUBCUTANEOUS at 09:07

## 2022-07-16 RX ADMIN — LEVOTHYROXINE SODIUM 112 MCG: 112 TABLET ORAL at 05:07

## 2022-07-16 NOTE — PROGRESS NOTES
Cardiac Surgery Progress Note     MIGDALIA overnight  Giapreza weaned to off, dopamine weaned to off     Gtts  Epi 0.08, levo 0.04, vaso 0.04  Lasix      A/P  1 Day Post-Op s/p chest closure s/p redo VAD     Precedex for sedation  Continue epi at current dose  Continue sun, urine output goal of -1L-1.5    Vidhi Nicolas MD, PGY-6  Cardiothoracic Surgery   [phone]

## 2022-07-16 NOTE — PLAN OF CARE
"SICU PLAN OF CARE NOTE    Dx: Left ventricular assist device (LVAD) complication    Goals of Care:  MAP >75     Vital Signs:  BP (!) 82/0 (BP Location: Left arm, Patient Position: Lying)   Pulse 101   Temp 98.24 °F (36.8 °C)   Resp 14   Ht 6' 0.99" (1.854 m)   Wt 122.5 kg (270 lb)   SpO2 100%   BMI 35.63 kg/m²     Cardiac:  NSR    Resp:  SpO2 98% on ventilator with 50% fio2, and peep 7    Neuro:  Sedated, Arouses to Voice, Follows Commands, and Moves All Extremities    Gtts:  Precedex, Insulin, Norepinephrine, Vasopressin, Epinephrine, and Heparin    Urine Output:  Urinary Catheter 1705 cc/shift    Drains:  Chest Tube, total output 365 cc /  shift    LVAD 5500 RPM, and VA ECMO 3800 RPM,, with 3.5 flows    Diet:  NPO     Labs/Accuchecks: cbc, cmp, mag, phos, aptt, inr every 6 hour, abg every 4 hours    Skin:  All skin remains free from injury.  Patient turned Q2, waffle mattress inflated, ICU bed working correctly.    Shift Events:  Patient's wife updated on phone of patient's plan of care for the day. Dr. Amezcua and Dr. Kaufman updated on patient's assessments, vital signs, urine output, lab results, vad numbers, and ecmo numbers throughout the day. Will continue to monitor patient.  See flowsheet for further assessment/details.  Family updated on current condition/plan of care, questions answered, and emotional support provided.   MD updated on current condition, vitals, labs, and gtts.  No new orders received, will continue to monitor.     "

## 2022-07-16 NOTE — PROGRESS NOTES
[facility] shift report    Date: 07/16/2022  ECMO Specialist:  Alycia Olivier    Pump parameters:  RPM: 3800  Flow:  3.2  Sweep:  4  FiO2:  100    Oxygenator status:  Clots: yes  Fibrin: yes    Pressure trends:  P1: 247  P2: 227  Delta P: 20  Negative:      Volume status:  Chugging noted? no  CVP: 6-8  MAP:  70s  MD notified (name):  Shae    Anticoagulation:  ACT/aPTT/Xa parameters: 40-80 ptt  ACT/aPTT/Xa trends this shift: 33, 33.4    Cannula size / status / placement:  Return cannula / from circuit to patient: 18Fr/ RFA/ 9.5cm  Drainage cannula / from patient to circuit: 27fr/ RFV/ 5.5cm  LV:  Dual lumen:      Additional Comments:      Pt maintained on ecmo. RPMs decreased per Dr. Kaufman. abgs within range. Hep increased for ptt.

## 2022-07-16 NOTE — SUBJECTIVE & OBJECTIVE
Interval History/Significant Events: No acute events overnight. Taken back for sternal closure yesterday which he tolerated well. Started on low dose heparin last night. No issues with flows. Weaning pressors.     Follow-up For: Procedure(s) (LRB):  CLOSURE, WOUND, STERNUM (N/A)  APPLICATION, WOUND VAC (N/A)  INSERTION, GRAFT, PERICARDIUM (N/A)  IRRIGATION, MEDIASTINUM (N/A)    Post-Operative Day: 1 Day Post-Op    Objective:     Vital Signs (Most Recent):  Temp: 98.06 °F (36.7 °C) (07/16/22 0600)  Pulse: 105 (07/16/22 0600)  Resp: 14 (07/16/22 0521)  BP: (!) 80/0 (07/16/22 0300)  SpO2: 100 % (07/15/22 1500)   Vital Signs (24h Range):  Temp:  [97.5 °F (36.4 °C)-98.06 °F (36.7 °C)] 98.06 °F (36.7 °C)  Pulse:  [] 105  Resp:  [14-17] 14  SpO2:  [100 %] 100 %  BP: (80-97)/(0-86) 80/0  Arterial Line BP: (70-86)/(54-71) 80/68     Weight: 122.5 kg (270 lb)  Body mass index is 35.63 kg/m².      Intake/Output Summary (Last 24 hours) at 7/16/2022 0750  Last data filed at 7/16/2022 0700  Gross per 24 hour   Intake 1934.52 ml   Output 4297 ml   Net -2362.48 ml       Physical Exam  Constitutional:       Comments: Intubated and sedated   HENT:      Head: Normocephalic and atraumatic.      Mouth/Throat:      Comments: OG in place  Eyes:      Pupils: Pupils are equal, round, and reactive to light.   Neck:      Comments: L IJ CVC  R IJ trialysis  Cardiovascular:      Rate and Rhythm: Regular rhythm. Tachycardia present.      Comments: On V-A ECMO -- 3600 rpm, 3.0L -- clots present in oxygenator  LVAD in place -- 5600 rpm, 4.7L    Midline sternotomy c/d with dressing in place    2 plerual and 2 mediastinal chest tubes to suction.    V pacing wires in place.   Pulmonary:      Comments: Mechanically ventilated  Abdominal:      General: There is no distension.      Palpations: Abdomen is soft.   Genitourinary:     Comments: Lagunas in place draining clear urine  Musculoskeletal:      Comments: ECMO cannula x2 (right femoral artery,  right femoral vein)    L brachial, right radial arterial line   Skin:     General: Skin is warm and dry.   Neurological:      Comments: Following commands when sedation paused       Vents:  Vent Mode: A/C (07/16/22 0521)  Ventilator Initiated: Yes (07/15/22 1027)  Set Rate: 14 BPM (07/16/22 0521)  Vt Set: 400 mL (07/16/22 0521)  PEEP/CPAP: 7 cmH20 (07/16/22 0521)  Oxygen Concentration (%): 50 (07/16/22 0600)  Peak Airway Pressure: 25 cmH2O (07/16/22 0521)  Plateau Pressure: 20 cmH20 (07/16/22 0521)  Total Ve: 5.37 mL (07/16/22 0521)  Negative Inspiratory Force (cm H2O): 0 (07/16/22 0521)  F/VT Ratio<105 (RSBI): (!) 36.46 (07/16/22 0521)    Lines/Drains/Airways       Central Venous Catheter Line  Duration                  ECMO Cannula 07/13/22 right femoral artery 3 days         ECMO Cannula 07/13/22 right femoral vein 3 days     Introducer with Double Lumen 07/13/22 0900 left internal jugular 2 days    Percutaneous Central Line Insertion/Assessment - Quad Lumen  07/13/22 0941 left internal jugular 2 days    Trialysis (Dialysis) Catheter 07/13/22 2100 right internal jugular 2 days              Drain  Duration                  Chest Tube 07/13/22 1916 1 Right Pleural 32 Fr. 2 days         Chest Tube 07/13/22 1916 2 Left Pleural 32 Fr. 2 days         Chest Tube 07/13/22 1916 3 Anterior Mediastinal 32 Fr. 2 days         Chest Tube 07/13/22 1916 4 Posterior Mediastinal 32 Fr. 2 days         Urethral Catheter 07/13/22 0848 Temperature probe;Latex 14 Fr. 2 days         NG/OG Tube 07/15/22 1030 Center mouth <1 day              Airway  Duration                  Airway - Non-Surgical 07/13/22 0848 2 days              Arterial Line  Duration             Arterial Line 07/13/22 0932 Left Brachial 2 days    Arterial Line 07/13/22 2000 Right Radial 2 days              Line  Duration                  VAD 03/08/18 1124 Left ventricular assist device HeartMate II 1590 days         VAD 07/13/22 1300 Left ventricular assist device  HeartMate 3 2 days              Peripheral Intravenous Line  Duration                  Midline Catheter Insertion/Assessment  - Single Lumen 06/28/22 1027 Right cephalic vein (lateral side of arm) 20g x 8cm 17 days                    Significant Labs:    CBC/Anemia Profile:  Recent Labs   Lab 07/15/22  1959 07/15/22  2001 07/15/22  2359 07/16/22  0333 07/16/22  0338   WBC 23.31*  --  22.61* 22.93*  --    HGB 8.4*  --  7.9* 8.3*  --    HCT 24.7*   < > 23.9* 24.4* 21*   PLT 80*  --  68* 80*  --    MCV 86  --  87 88  --    RDW 15.8*  --  15.9* 15.9*  --     < > = values in this interval not displayed.        Chemistries:  Recent Labs   Lab 07/15/22  1029 07/15/22  1426 07/15/22  1959 07/15/22  2359 07/16/22  0333    142 142 143 143   K 4.9 4.7 4.5 4.3 4.5    103 103 106 104   CO2 28 27 29 26 27   BUN 39* 39* 45* 51* 55*   CREATININE 3.6* 3.6* 3.7* 3.7* 4.2*   CALCIUM 8.1* 8.1* 7.9* 8.1* 8.5*   ALBUMIN 2.2* 2.0*  --   --  2.1*   PROT 4.0* 4.5*  --   --  4.7*   BILITOT 6.4* 6.5*  --   --  7.7*   ALKPHOS 64 66  --   --  77   * 139*  --   --  109*   * 249*  --   --  205*   MG 2.6 2.4 2.4 2.3 2.6   PHOS 6.8*  --  6.6* 6.6* 6.9*       ABGs:   Recent Labs   Lab 07/16/22 0728   PH 7.403   PCO2 46.0*   HCO3 28.7*   POCSATURATED 80*   BE 4     Coagulation:   Recent Labs   Lab 07/16/22 0333   INR 1.2   APTT 32.8*     Lactic Acid: No results for input(s): LACTATE in the last 48 hours.  All pertinent labs within the past 24 hours have been reviewed.    Significant Imaging:  I have reviewed all pertinent imaging results/findings within the past 24 hours.

## 2022-07-16 NOTE — ASSESSMENT & PLAN NOTE
Tim Richards is a 56 y.o. male HFrEF with an EF of 10% and a history of HM2 LVAD in 2018 who is now s/p HM3 upgrade, initiation of V-A ECMO after failure to wean off bypass x2      Neuro/Psych:   -- Sedation: Precedex.  -- Pain: PRN Dilaudid             Cards:   -- S/P LVAD exchange on 7/14/22  -- Requiring high dose pressors:   Epi 0.0.8   Levo 0.04   Vaso 0.04   Dopamine off   Giapreza 40  -- Stress dose steroids  -- Ventricular pacing wires. Paced at 80 BPM  -- MAP goal 75  -- VAD and ECMO flows stable  -- Sternal closure on 7/15      Pulm:   -- Goal O2 sat > 90%  -- ABG PRN  -- Mediastinal chest tubes x2 and pleural chest tube x2 to suction  -- Nitric at 5 ppm      Renal:  -- Keep sun for strict I/O  -- Trend BUN/Cr 55/4.2 <-- 31/3.5 <-- 20/2.4  -- Lasix drip at 10  -- Goal negative 1-2L      FEN / GI:   -- Replace lytes as needed  -- Nutrition: NPO  -- GI ppx: famotidine      ID:   -- Tm: afebrile; WBC stable  -- ABX complete      Heme/Onc:   -- H/H stable   -- Daily CBC  -- Received 18 pRBCs, 16 FFP, 2 plt, 25 cryo; 1500 albumin intra-op  -- No product requirement since sternal closure      Endo:   -- BG goal 140-180  -- Insulin gtt      PPx:   Feeding: NPO  Analgesia/Sedation: Precedex / PRN Dilaudid  Thromboembolic prevention: SCDs  HOB >30: Yes  Stress Ulcer ppx: famotidine  Glucose control: Critical care goal 140-180 g/dl, ISS     Lines/Drains/Airway: ETT; OG; ECMO canula x2; RIJ trialysis, LIJ CVC and gertrude, r-radial a-line, Chest tube x4; sun; [place], VAD; midline      Dispo/Code Status/Palliative:   -- SICU / Full Code.

## 2022-07-16 NOTE — PLAN OF CARE
"      SICU PLAN OF CARE NOTE    Dx: Left ventricular assist device (LVAD) complication    Shift Events: Levo, vaso, and giapreza titrated down.  Dopamine off.  Heparin started.  NO decreased to 5ppm.    Neuro: Arouses to Voice, Follows Commands, and Moves All Extremities    Vital Signs: BP (!) 80/0 (BP Location: Left arm, Patient Position: Lying)   Pulse 105   Temp 98.06 °F (36.7 °C) (Core Bladder)   Resp 14   Ht 6' 0.99" (1.854 m)   Wt 122.5 kg (270 lb)   SpO2 100%   BMI 35.63 kg/m²     VA- ECMO:  Speed 4000, Flows 3.5, FiO2 100%, SWEEP 4    LVAD - HM3: Speed 5500, Flows 4.6-4.7    Respiratory: Ventilator A/C VC+, FiO2 50%, PEEP 7    NO @ 5ppm    Diet: NPO    Gtts: Precedex, Insulin, Norepinephrine, Vasopressin, Epinephrine, Lasix, Heparin, and Angiotensin    Urine Output: Urinary Catheter  cc/hour    Chest tubes: Right pleural 14cc/shift, left pleural 130cc/shift, ant med 60cc/shift, post med 60cc/shift         "

## 2022-07-16 NOTE — PLAN OF CARE
"SICU PLAN OF CARE NOTE    Dx: Left ventricular assist device (LVAD) complication    Goals of Care:  MAP >***    Vital Signs:  BP (!) 82/0 (BP Location: Left arm, Patient Position: Lying)   Pulse 104   Temp 98.06 °F (36.7 °C)   Resp 15   Ht 6' 0.99" (1.854 m)   Wt 122.5 kg (270 lb)   SpO2 100%   BMI 35.63 kg/m²     Cardiac:  NSR    Resp:  SpO2 ***% on     Neuro:  {SICU Neuro:95353}    Gtts:  {SICU GTTS:76154}    Urine Output:  {ICU Output:63093} *** cc/shift    Drains:  {ICU Drains (Optional):53630}    {SICU Frequent (Optional):13631}    Diet:  {SICU DIET (Optional):69399}     Labs/Accuchecks:  ***    Skin:  All skin remains free from injury.  Patient turned Q2, waffle mattress inflated, ICU bed working correctly.    Shift Events:  ***.  See flowsheet for further assessment/details.  Family updated on current condition/plan of care, questions answered, and emotional support provided.   MD updated on current condition, vitals, labs, and gtts.  No new orders received, will continue to monitor.     "

## 2022-07-16 NOTE — PROGRESS NOTES
..[facility] shift report    Date: 07/16/2022  EC[facility] Specialist:  Pippa Horan    Pump parameters:  RPM: 4000  Flow:  3.55-3.58  Sweep:  4  FiO2:  100%    Oxygenator status:  Clots: pre / post oxy  Fibrin: pre/ post oxy     @0400 flushed CDI cuvette due to increasing fibrin/clot buildup, issue resolved    Pressure trends:  P1: 277-279  P2: 255-258  Delta P: 21-22  Negative:      Volume status:  Chugging noted no  CVP: 11-13  MAP:  75-79  MD notified (name): Shae    Anticoagulation:  aPTT parameters: 40-80  aPTT trends this shift: 33.7/33/32.8    Cannula size / status / placement:  Return cannula / from circuit to patient:18Fr RFA 9.5cm [employer]   Drainage cannula / from patient to circuit: 27Fr RFV 5.5cm [employer]      No drainage/oozing  Additional Comments:    @2000 began Heparin @ 100u, increased to 200u @0200, increased to 300u @.  No products give.  Nitric weaned to 5ppm.

## 2022-07-16 NOTE — PROGRESS NOTES
[facility]  Critical Care - Surgery  Progress Note    Patient Name: Tim Richards  MRN: [id number]  Admission Date: 6/27/2022  Hospital Length of Stay: 19 days  Code Status: Full Code  Attending Provider: Yg Kaufman MD  Primary Care Provider: Deyanira Booth MD   Principal Problem: Left ventricular assist device (LVAD) complication    Subjective:     Hospital/ICU Course:  No notes on file    Interval History/Significant Events: No acute events overnight. Taken back for sternal closure yesterday which he tolerated well. Started on low dose heparin last night. No issues with flows. Weaning pressors.     Follow-up For: Procedure(s) (LRB):  CLOSURE, WOUND, STERNUM (N/A)  APPLICATION, WOUND VAC (N/A)  INSERTION, GRAFT, PERICARDIUM (N/A)  IRRIGATION, MEDIASTINUM (N/A)    Post-Operative Day: 1 Day Post-Op    Objective:     Vital Signs (Most Recent):  Temp: 98.06 °F (36.7 °C) (07/16/22 0600)  Pulse: 105 (07/16/22 0600)  Resp: 14 (07/16/22 0521)  BP: (!) 80/0 (07/16/22 0300)  SpO2: 100 % (07/15/22 1500)   Vital Signs (24h Range):  Temp:  [97.5 °F (36.4 °C)-98.06 °F (36.7 °C)] 98.06 °F (36.7 °C)  Pulse:  [] 105  Resp:  [14-17] 14  SpO2:  [100 %] 100 %  BP: (80-97)/(0-86) 80/0  Arterial Line BP: (70-86)/(54-71) 80/68     Weight: 122.5 kg (270 lb)  Body mass index is 35.63 kg/m².      Intake/Output Summary (Last 24 hours) at 7/16/2022 0750  Last data filed at 7/16/2022 0700  Gross per 24 hour   Intake 1934.52 ml   Output 4297 ml   Net -2362.48 ml       Physical Exam  Constitutional:       Comments: Intubated and sedated   HENT:      Head: Normocephalic and atraumatic.      Mouth/Throat:      Comments: OG in place  Eyes:      Pupils: Pupils are equal, round, and reactive to light.   Neck:      Comments: L IJ CVC  R IJ trialysis  Cardiovascular:      Rate and Rhythm: Regular rhythm. Tachycardia present.      Comments: On V-A ECMO -- 3600 rpm, 3.0L -- clots present in oxygenator  LVAD in  place -- 5600 rpm, 4.7L    Midline sternotomy c/d with dressing in place    2 plerual and 2 mediastinal chest tubes to suction.    V pacing wires in place.   Pulmonary:      Comments: Mechanically ventilated  Abdominal:      General: There is no distension.      Palpations: Abdomen is soft.   Genitourinary:     Comments: Lagunas in place draining clear urine  Musculoskeletal:      Comments: ECMO cannula x2 (right femoral artery, right femoral vein)    L brachial, right radial arterial line   Skin:     General: Skin is warm and dry.   Neurological:      Comments: Following commands when sedation paused       Vents:  Vent Mode: A/C (07/16/22 0521)  Ventilator Initiated: Yes (07/15/22 1027)  Set Rate: 14 BPM (07/16/22 0521)  Vt Set: 400 mL (07/16/22 0521)  PEEP/CPAP: 7 cmH20 (07/16/22 0521)  Oxygen Concentration (%): 50 (07/16/22 0600)  Peak Airway Pressure: 25 cmH2O (07/16/22 0521)  Plateau Pressure: 20 cmH20 (07/16/22 0521)  Total Ve: 5.37 mL (07/16/22 0521)  Negative Inspiratory Force (cm H2O): 0 (07/16/22 0521)  F/VT Ratio<105 (RSBI): (!) 36.46 (07/16/22 0521)    Lines/Drains/Airways       Central Venous Catheter Line  Duration                  ECMO Cannula 07/13/22 right femoral artery 3 days         ECMO Cannula 07/13/22 right femoral vein 3 days     Introducer with Double Lumen 07/13/22 0900 left internal jugular 2 days    Percutaneous Central Line Insertion/Assessment - Quad Lumen  07/13/22 0941 left internal jugular 2 days    Trialysis (Dialysis) Catheter 07/13/22 2100 right internal jugular 2 days              Drain  Duration                  Chest Tube 07/13/22 1916 1 Right Pleural 32 Fr. 2 days         Chest Tube 07/13/22 1916 2 Left Pleural 32 Fr. 2 days         Chest Tube 07/13/22 1916 3 Anterior Mediastinal 32 Fr. 2 days         Chest Tube 07/13/22 1916 4 Posterior Mediastinal 32 Fr. 2 days         Urethral Catheter 07/13/22 0848 Temperature probe;Latex 14 Fr. 2 days         NG/OG Tube 07/15/22 1030 [place]  mouth <1 day              Airway  Duration                  Airway - Non-Surgical 07/13/22 0848 2 days              Arterial Line  Duration             Arterial Line 07/13/22 0932 Left Brachial 2 days    Arterial Line 07/13/22 2000 Right Radial 2 days              Line  Duration                  VAD 03/08/18 1124 Left ventricular assist device HeartMate II 1590 days         VAD 07/13/22 1300 Left ventricular assist device HeartMate 3 2 days              Peripheral Intravenous Line  Duration                  Midline Catheter Insertion/Assessment  - Single Lumen 06/28/22 1027 Right cephalic vein (lateral side of arm) 20g x 8cm 17 days                    Significant Labs:    CBC/Anemia Profile:  Recent Labs   Lab 07/15/22  1959 07/15/22  2001 07/15/22  2359 07/16/22  0333 07/16/22  0338   WBC 23.31*  --  22.61* 22.93*  --    HGB 8.4*  --  7.9* 8.3*  --    HCT 24.7*   < > 23.9* 24.4* 21*   PLT 80*  --  68* 80*  --    MCV 86  --  87 88  --    RDW 15.8*  --  15.9* 15.9*  --     < > = values in this interval not displayed.        Chemistries:  Recent Labs   Lab 07/15/22  1029 07/15/22  1426 07/15/22  1959 07/15/22  2359 07/16/22  0333    142 142 143 143   K 4.9 4.7 4.5 4.3 4.5    103 103 106 104   CO2 28 27 29 26 27   BUN 39* 39* 45* 51* 55*   CREATININE 3.6* 3.6* 3.7* 3.7* 4.2*   CALCIUM 8.1* 8.1* 7.9* 8.1* 8.5*   ALBUMIN 2.2* 2.0*  --   --  2.1*   PROT 4.0* 4.5*  --   --  4.7*   BILITOT 6.4* 6.5*  --   --  7.7*   ALKPHOS 64 66  --   --  77   * 139*  --   --  109*   * 249*  --   --  205*   MG 2.6 2.4 2.4 2.3 2.6   PHOS 6.8*  --  6.6* 6.6* 6.9*       ABGs:   Recent Labs   Lab 07/16/22  0728   PH 7.403   PCO2 46.0*   HCO3 28.7*   POCSATURATED 80*   BE 4     Coagulation:   Recent Labs   Lab 07/16/22  0333   INR 1.2   APTT 32.8*     Lactic Acid: No results for input(s): LACTATE in the last 48 hours.  All pertinent labs within the past 24 hours have been reviewed.    Significant Imaging:  I have  reviewed all pertinent imaging results/findings within the past 24 hours.    Assessment/Plan:     Chronic combined systolic and diastolic heart failure  Tim Richards is a 56 y.o. male HFrEF with an EF of 10% and a history of HM2 LVAD in 2018 who is now s/p HM3 upgrade, initiation of V-A ECMO after failure to wean off bypass x2      Neuro/Psych:   -- Sedation: Precedex.  -- Pain: PRN Dilaudid             Cards:   -- S/P LVAD exchange on 7/14/22  -- Requiring high dose pressors:   Epi 0.0.8   Levo 0.04   Vaso 0.04   Dopamine off   Giapreza 40  -- Stress dose steroids  -- Ventricular pacing wires. Paced at 80 BPM  -- MAP goal 75  -- VAD and ECMO flows stable  -- Sternal closure on 7/15      Pulm:   -- Goal O2 sat > 90%  -- ABG PRN  -- Mediastinal chest tubes x2 and pleural chest tube x2 to suction  -- Nitric at 5 ppm      Renal:  -- Keep sun for strict I/O  -- Trend BUN/Cr 55/4.2 <-- 31/3.5 <-- 20/2.4  -- Lasix drip at 10  -- Goal negative 1-2L      FEN / GI:   -- Replace lytes as needed  -- Nutrition: NPO  -- GI ppx: famotidine      ID:   -- Tm: afebrile; WBC stable  -- ABX complete      Heme/Onc:   -- H/H stable   -- Daily CBC  -- Received 18 pRBCs, 16 FFP, 2 plt, 25 cryo; 1500 albumin intra-op  -- No product requirement since sternal closure      Endo:   -- BG goal 140-180  -- Insulin gtt      PPx:   Feeding: NPO  Analgesia/Sedation: Precedex / PRN Dilaudid  Thromboembolic prevention: SCDs  HOB >30: Yes  Stress Ulcer ppx: famotidine  Glucose control: Critical care goal 140-180 g/dl, ISS     Lines/Drains/Airway: ETT; OG; ECMO canula x2; RIJ trialysis, LIJ CVC and gertrude, r-radial a-line, Chest tube x4; sun; WV, VAD; midline      Dispo/Code Status/Palliative:   -- SICU / Full Code.              Dang Amezcua MD  Critical Care - Surgery  [facility]

## 2022-07-16 NOTE — PROGRESS NOTES
"[facility]  Nephrology  Progress Note    Patient Name: Tim Richards  MRN: [id number]  Admission Date: 6/27/2022  Hospital Length of Stay: 19 days  Attending Provider: Yg Kaufman MD   Primary Care Physician: Deyanira Booth MD  Principal Problem:Left ventricular assist device (LVAD) complication    Subjective:     HPI:   Tim Richards is a 56 y.o. male w/ Known CKD 3b (without prior nephrology f/u), NICM, end-stage HF, EF 10% s/p HM2 LVAD (2018) and ICD placement (2014), ventricular fibrillation (2020), GI bleed (2019), hypothyroidism, alcohol use (2014), nicotine dependence, and [facility] amitted on 6/27/2022 for evaluation and management of decreased appetite, decreased and dark-colored urine, and increasing shortness of breath.     History obtained from EMR and family at bedside.    In the ED, pt presented with the following VS:   Initial Vitals   BP Pulse Resp Temp SpO2   06/27/22 1129 06/27/22 1038 06/27/22 1038 06/27/22 1038 06/27/22 1810   (!) 94/0 93 20 98 °F (36.7 °C) 95 %      MAP       --                Pt was found to be in acute decompensated heart failure, hypoxic, and severely volume overloaded, for which he was started on BIPAP and on lasix gtt. On 6/30 pt had ventricular tachycardia with ICD shock, this was believed to be 2/2 hypokalemia.   His LVAD was interrogated and there was a concern of LVAD thrombosis and pt was started on Integrilin,however whilke on medical management pt continue with worsening hemolysis and so CTS was consulted and recommended upgrading to HM3 and on 7/13 pt underwent HM 2 explantation, and implantation of HM 3 LVAD. Intraoperatively pt had significant vasoplegic shock and pt was placed on ECMO as well as on vasopressors and steroids for vasodilatory shock. Pt remained intubated postoperatively.   Of note, on admission pt was found to be COVID 19 + and was treated with Remdesivir.     Nephrology was consulted for: "may soon have indication " "for dialysis"  Baseline sCr: 1.9-2.3  On admission his Cr was: 2.5    On 7/13 (day of surgery) his I/Os were as follows: 20L/4.4L, net +15.6L, urine 1965mls and 2.5L from chest tube    On 7/14 to 7/15 his I/Os were: 6.4/5.5L,net 866mls, 1.6L chest tube and 4L urine         Interval History: Patient seen and examined this morning. No acute events overnight. Weaned off Giapreza this morning. Afebrile with pulse ranging from 110-100s bpm. Systolic blood pressures ranging from 80-70s from artrial line. Still intubated on PEEP 7 and FiO2 50% and UOP with ~3.5 liters in the last 24 hours. BUN 55, creatinine 3.7, FEUrea 38.4%, phosphorous 6.9, UPCR 0.63 and urine osmolality.  Continued on Lasix infusion.    Review of patient's allergies indicates:   Allergen Reactions    Lisinopril Anaphylaxis    Hydralazine analogues      Chronic constipation, impotence, dizziness     Current Facility-Administered Medications   Medication Frequency    0.9%  NaCl infusion PRN    angiotensin II (GIAPREZA) 2,500,000 ng in sodium chloride 0.9% 250 mL infusion Continuous    cefepime in dextrose 5 % IVPB 2 g Q12H    dexmedetomidine (PRECEDEX) 400mcg/100mL 0.9% NaCL infusion Continuous    dextrose 10% bolus 125 mL PRN    dextrose 10% bolus 250 mL PRN    DOPamine 400 mg in dextrose 5 % 250 mL infusion (premix) Continuous    EPINEPHrine (ADRENALIN) 10 mg in dextrose 5 % 250 mL infusion Continuous    furosemide (LASIX) 200 mg in dextrose 5 % 100 mL continuous infusion (conc: 2 mg/mL) Continuous    heparin 25,000 units in dextrose 5% 250 mL (100 units/mL) infusion (heparin infusion - NO NOMOGRAM) Continuous    hydrocortisone sodium succinate injection 100 mg Q8H    HYDROmorphone injection 1 mg Q4H PRN    HYDROmorphone injection 2 mg Q4H PRN    insulin regular in 0.9 % NaCl 100 unit/100 mL (1 unit/mL) infusion Continuous    levothyroxine tablet 112 mcg Before breakfast    mupirocin 2 % ointment BID    nitric oxide gas Gas 5 ppm " Continuous    NORepinephrine 8 mg in dextrose 5% 250 mL infusion Continuous    pantoprazole injection 40 mg BID    propofol (DIPRIVAN) 10 mg/mL infusion Continuous    vasopressin (PITRESSIN) 1 Units/mL in dextrose 5 % 100 mL infusion Continuous       Objective:     Vital Signs (Most Recent):  Temp: 98.06 °F (36.7 °C) (07/16/22 0600)  Pulse: 105 (07/16/22 0600)  Resp: 14 (07/16/22 0521)  BP: (!) 80/0 (07/16/22 0300)  SpO2: 100 % (07/15/22 1500)  O2 Device (Oxygen Therapy): ventilator (07/16/22 0600)   Vital Signs (24h Range):  Temp:  [97.5 °F (36.4 °C)-98.06 °F (36.7 °C)] 98.06 °F (36.7 °C)  Pulse:  [] 105  Resp:  [14-17] 14  SpO2:  [100 %] 100 %  BP: (80-97)/(0-86) 80/0  Arterial Line BP: (70-86)/(54-71) 80/68     Weight: 122.5 kg (270 lb) (07/16/22 0500)  Body mass index is 35.63 kg/m².  Body surface area is 2.51 meters squared.    I/O last 3 completed shifts:  In: 4179.7 [I.V.:2317; IV Piggyback:1862.7]  Out: 6654 [Urine:5515; Chest Tube:1139]    Physical Exam  Vitals and nursing note reviewed.   Constitutional:       General: He is in acute distress.      Appearance: He is ill-appearing. He is not toxic-appearing or diaphoretic.   HENT:      Mouth/Throat:      Mouth: Mucous membranes are moist.   Cardiovascular:      Rate and Rhythm: Normal rate and regular rhythm.      Pulses: Normal pulses.      Heart sounds: Murmur heard.      Comments: Intubated   ECHMO catheters.   Trialysis catheter.   Pulmonary:      Effort: Pulmonary effort is normal. No respiratory distress.      Breath sounds: Normal breath sounds. No stridor. No wheezing, rhonchi or rales.      Comments: Intubated and sedated.  Abdominal:      General: There is no distension.      Palpations: There is no mass.   Genitourinary:     Comments: Dark yellow colored urine in sun collection bag.  Musculoskeletal:         General: Swelling present. No tenderness, deformity or signs of injury.      Right lower leg: Edema present.      Left lower  leg: Edema present.   Skin:     General: Skin is warm.      Capillary Refill: Capillary refill takes 2 to 3 seconds.      Coloration: Skin is pale. Skin is not jaundiced.      Findings: No bruising, erythema, lesion or rash.     Significant Labs:  ABGs:   Recent Labs   Lab 07/16/22 0412   PH 7.485*   PCO2 44.0   HCO3 33.1*   POCSATURATED 100   BE 10     BMP:   Recent Labs   Lab 07/16/22 0333   *      K 4.5      CO2 27   BUN 55*   CREATININE 4.2*   CALCIUM 8.5*   MG 2.6     CBC:   Recent Labs   Lab 07/16/22 0333 07/16/22 0338   WBC 22.93*  --    RBC 2.79*  --    HGB 8.3*  --    HCT 24.4* 21*   PLT 80*  --    MCV 88  --    MCH 29.7  --    MCHC 34.0  --      CMP:   Recent Labs   Lab 07/16/22 0333   *   CALCIUM 8.5*   ALBUMIN 2.1*   PROT 4.7*      K 4.5   CO2 27      BUN 55*   CREATININE 4.2*   ALKPHOS 77   *   *   BILITOT 7.7*     Coagulation:   Recent Labs   Lab 07/16/22 0333   INR 1.2   APTT 32.8*     LFTs:   Recent Labs   Lab 07/16/22 0333   *   *   ALKPHOS 77   BILITOT 7.7*   PROT 4.7*   ALBUMIN 2.1*     TSH:   Recent Labs   Lab 07/13/22  1649   TSH 0.111*     Significant Imaging:  I have reviewed all imagining in the last 24 hours.    Assessment/Plan:     WAGNER (acute kidney injury)  -Ischemic ATN 2/2 decrease perfusion severe hypotension in a patient with known CKD 3b  Baseline sCr: 1.9-2.3  On admission his Cr was: 2.5  Lab Results   Component Value Date    CREATININE 4.2 (H) 07/16/2022     7/15 urine microscopy showed: many granular casts, muddy brown casts, waxy casts, some WBCS, some yeast, and occasional RBCs    Recommendations:   -Agree with continuing Lasix 10 mg/hr for now  -Electrolytes: replace as necessary, renal diet, page nephrology if K >5.5   · For hyperK: may napier lokelma    Phos level 6.8, start phos binders Sevelamer 800mg tid IF not NPO   Mg 2.4, goal >2  -Acid/base: AGMA 2/2 ATN  -Fluid balance: fluid overloaded    -Anemia: Hgb 8.7, send anemia panel labs, transfuse for Hgb <7.0  -Strict I/O's and daily weights  -Renal function panel and Mg levels Q8H   -Check uric acid, CK level, lactic acid, TSH, and urine labs (ordered)  -check Renal ultrasound   -Maintain MAP >65 for renal perfusion    There is no immediate indication for KRT at this time        Dilated cardiomyopathy  S/p ICD placement and 7/13 pt underwent HM 2 explantation, and implantation of HM 3 LVAD.    Heart replaced by heart assist device  7/13 pt underwent HM 2 explantation, and implantation of HM 3 LVAD  -Plan per primary team       Leukocytosis  Lab Results   Component Value Date    WBC 25.15 (H) 07/16/2022     -Plan per primary team       Chronic combined systolic and diastolic heart failure  -Agree with continuing Lasix infusion for now  -Plan per primary team           Thank you for your consult. I will follow-up with patient. Please contact us if you have any additional questions.    Osmar Jackson MD  Nephrology  [facility]

## 2022-07-16 NOTE — PROGRESS NOTES
07/16/2022  Fitz Christianson    Current provider:  Yg Kaufman MD    Device interrogation:  TXP LVAD INTERROGATIONS 7/16/2022 7/16/2022 7/16/2022 7/16/2022 7/16/2022 7/16/2022 7/16/2022   Type HeartMate3 HeartMate3 HeartMate3 HeartMate3 HeartMate3 HeartMate3 HeartMate3   Flow 4.5 4.5 4.5 4.4 4.4 4.4 4.5   Speed 5000 5550 5500 5500 5500 5500 5500   PI 2.5 2.4 2.7 2.7 2.5 2.5 2.2   Power (Jackson) 3.9 4.0 3.9 3.9 4.0 3.9 3.9   LSL - - - - - 5100 5100   Pulsatility - - - - - - -          Rounded on Tim Richards to ensure all mechanical assist device settings (IABP or VAD) were appropriate and all parameters were within limits.  I was able to ensure all back up equipment was present, the staff had no issues, and the Perfusion Department daily rounding was complete.      For implantable VADs: Interrogation of Ventricular assist device was performed with analysis of device parameters and review of device function. I have personally reviewed the interrogation findings and agree with findings as stated.     In emergency, the nursing units have been notified to contact the perfusion department either by:  Calling [phone] from 630am to 4pm Mon thru Fri, utilizing the On-Call Finder functionality of Epic and searching for Perfusion, or by contacting the hospital  from 4pm to 630am and on weekends and asking to speak with the perfusionist on call.    2:19 PM

## 2022-07-16 NOTE — SUBJECTIVE & OBJECTIVE
Interval History: Patient seen and examined this morning. No acute events overnight. Weaned off Giapreza this morning. Afebrile with pulse ranging from 110-100s bpm. Systolic blood pressures ranging from 80-70s from artrial line. Still intubated on PEEP 7 and FiO2 50% and UOP with ~3.5 liters in the last 24 hours. BUN 55, creatinine 3.7, FEUrea 38.4%, phosphorous 6.9, UPCR 0.63 and urine osmolality.  Continued on Lasix infusion.    Review of patient's allergies indicates:   Allergen Reactions    Lisinopril Anaphylaxis    Hydralazine analogues      Chronic constipation, impotence, dizziness     Current Facility-Administered Medications   Medication Frequency    0.9%  NaCl infusion PRN    angiotensin II (GIAPREZA) 2,500,000 ng in sodium chloride 0.9% 250 mL infusion Continuous    cefepime in dextrose 5 % IVPB 2 g Q12H    dexmedetomidine (PRECEDEX) 400mcg/100mL 0.9% NaCL infusion Continuous    dextrose 10% bolus 125 mL PRN    dextrose 10% bolus 250 mL PRN    DOPamine 400 mg in dextrose 5 % 250 mL infusion (premix) Continuous    EPINEPHrine (ADRENALIN) 10 mg in dextrose 5 % 250 mL infusion Continuous    furosemide (LASIX) 200 mg in dextrose 5 % 100 mL continuous infusion (conc: 2 mg/mL) Continuous    heparin 25,000 units in dextrose 5% 250 mL (100 units/mL) infusion (heparin infusion - NO NOMOGRAM) Continuous    hydrocortisone sodium succinate injection 100 mg Q8H    HYDROmorphone injection 1 mg Q4H PRN    HYDROmorphone injection 2 mg Q4H PRN    insulin regular in 0.9 % NaCl 100 unit/100 mL (1 unit/mL) infusion Continuous    levothyroxine tablet 112 mcg Before breakfast    mupirocin 2 % ointment BID    nitric oxide gas Gas 5 ppm Continuous    NORepinephrine 8 mg in dextrose 5% 250 mL infusion Continuous    pantoprazole injection 40 mg BID    propofol (DIPRIVAN) 10 mg/mL infusion Continuous    vasopressin (PITRESSIN) 1 Units/mL in dextrose 5 % 100 mL infusion Continuous       Objective:     Vital Signs (Most  Recent):  Temp: 98.06 °F (36.7 °C) (07/16/22 0600)  Pulse: 105 (07/16/22 0600)  Resp: 14 (07/16/22 0521)  BP: (!) 80/0 (07/16/22 0300)  SpO2: 100 % (07/15/22 1500)  O2 Device (Oxygen Therapy): ventilator (07/16/22 0600)   Vital Signs (24h Range):  Temp:  [97.5 °F (36.4 °C)-98.06 °F (36.7 °C)] 98.06 °F (36.7 °C)  Pulse:  [] 105  Resp:  [14-17] 14  SpO2:  [100 %] 100 %  BP: (80-97)/(0-86) 80/0  Arterial Line BP: (70-86)/(54-71) 80/68     Weight: 122.5 kg (270 lb) (07/16/22 0500)  Body mass index is 35.63 kg/m².  Body surface area is 2.51 meters squared.    I/O last 3 completed shifts:  In: 4179.7 [I.V.:2317; IV Piggyback:1862.7]  Out: 6654 [Urine:5515; Chest Tube:1139]    Physical Exam  Vitals and nursing note reviewed.   Constitutional:       General: He is in acute distress.      Appearance: He is ill-appearing. He is not toxic-appearing or diaphoretic.   HENT:      Mouth/Throat:      Mouth: Mucous membranes are moist.   Cardiovascular:      Rate and Rhythm: Normal rate and regular rhythm.      Pulses: Normal pulses.      Heart sounds: Murmur heard.      Comments: Intubated   ECHMO catheters.   Trialysis catheter.   Pulmonary:      Effort: Pulmonary effort is normal. No respiratory distress.      Breath sounds: Normal breath sounds. No stridor. No wheezing, rhonchi or rales.      Comments: Intubated and sedated.  Abdominal:      General: There is no distension.      Palpations: There is no mass.   Genitourinary:     Comments: Dark yellow colored urine in sun collection bag.  Musculoskeletal:         General: Swelling present. No tenderness, deformity or signs of injury.      Right lower leg: Edema present.      Left lower leg: Edema present.   Skin:     General: Skin is warm.      Capillary Refill: Capillary refill takes 2 to 3 seconds.      Coloration: Skin is pale. Skin is not jaundiced.      Findings: No bruising, erythema, lesion or rash.     Significant Labs:  ABGs:   Recent Labs   Lab 07/16/22  0413    PH 7.485*   PCO2 44.0   HCO3 33.1*   POCSATURATED 100   BE 10     BMP:   Recent Labs   Lab 07/16/22 0333   *      K 4.5      CO2 27   BUN 55*   CREATININE 4.2*   CALCIUM 8.5*   MG 2.6     CBC:   Recent Labs   Lab 07/16/22 0333 07/16/22 0338   WBC 22.93*  --    RBC 2.79*  --    HGB 8.3*  --    HCT 24.4* 21*   PLT 80*  --    MCV 88  --    MCH 29.7  --    MCHC 34.0  --      CMP:   Recent Labs   Lab 07/16/22 0333   *   CALCIUM 8.5*   ALBUMIN 2.1*   PROT 4.7*      K 4.5   CO2 27      BUN 55*   CREATININE 4.2*   ALKPHOS 77   *   *   BILITOT 7.7*     Coagulation:   Recent Labs   Lab 07/16/22 0333   INR 1.2   APTT 32.8*     LFTs:   Recent Labs   Lab 07/16/22 0333   *   *   ALKPHOS 77   BILITOT 7.7*   PROT 4.7*   ALBUMIN 2.1*     TSH:   Recent Labs   Lab 07/13/22  1649   TSH 0.111*     Significant Imaging:  I have reviewed all imagining in the last 24 hours.

## 2022-07-16 NOTE — ASSESSMENT & PLAN NOTE
-Ischemic ATN 2/2 decrease perfusion severe hypotension in a patient with known CKD 3b  Baseline sCr: 1.9-2.3  On admission his Cr was: 2.5  Lab Results   Component Value Date    CREATININE 4.2 (H) 07/16/2022     7/15 urine microscopy showed: many granular casts, muddy brown casts, waxy casts, some WBCS, some yeast, and occasional RBCs    Recommendations:   -Agree with continuing Lasix 10 mg/hr for now  -Electrolytes: replace as necessary, renal diet, page nephrology if K >5.5   · For hyperK: may napier lokelma    Phos level 6.8, start phos binders Sevelamer 800mg tid IF not NPO   Mg 2.4, goal >2  -Acid/base: AGMA 2/2 ATN  -Fluid balance: fluid overloaded   -Anemia: Hgb 8.7, send anemia panel labs, transfuse for Hgb <7.0  -Strict I/O's and daily weights  -Renal function panel and Mg levels Q8H   -Check uric acid, CK level, lactic acid, TSH, and urine labs (ordered)  -check Renal ultrasound   -Maintain MAP >65 for renal perfusion    There is no immediate indication for KRT at this time

## 2022-07-17 LAB
ALBUMIN SERPL BCP-MCNC: 2.1 G/DL (ref 3.5–5.2)
ALBUMIN SERPL BCP-MCNC: 2.2 G/DL (ref 3.5–5.2)
ALP SERPL-CCNC: 102 U/L (ref 55–135)
ALP SERPL-CCNC: 104 U/L (ref 55–135)
ALP SERPL-CCNC: 106 U/L (ref 55–135)
ALP SERPL-CCNC: 94 U/L (ref 55–135)
ALP SERPL-CCNC: 95 U/L (ref 55–135)
ALT SERPL W/O P-5'-P-CCNC: 65 U/L (ref 10–44)
ALT SERPL W/O P-5'-P-CCNC: 68 U/L (ref 10–44)
ALT SERPL W/O P-5'-P-CCNC: 70 U/L (ref 10–44)
ALT SERPL W/O P-5'-P-CCNC: 73 U/L (ref 10–44)
ALT SERPL W/O P-5'-P-CCNC: 74 U/L (ref 10–44)
ANION GAP SERPL CALC-SCNC: 11 MMOL/L (ref 8–16)
ANION GAP SERPL CALC-SCNC: 13 MMOL/L (ref 8–16)
ANION GAP SERPL CALC-SCNC: 13 MMOL/L (ref 8–16)
ANION GAP SERPL CALC-SCNC: 14 MMOL/L (ref 8–16)
APTT BLDCRRT: 27.8 SEC (ref 21–32)
APTT BLDCRRT: 28 SEC (ref 21–32)
APTT BLDCRRT: 29 SEC (ref 21–32)
APTT BLDCRRT: 30.4 SEC (ref 21–32)
AST SERPL-CCNC: 131 U/L (ref 10–40)
AST SERPL-CCNC: 141 U/L (ref 10–40)
AST SERPL-CCNC: 156 U/L (ref 10–40)
AST SERPL-CCNC: 163 U/L (ref 10–40)
AST SERPL-CCNC: 166 U/L (ref 10–40)
BASOPHILS # BLD AUTO: 0.02 K/UL (ref 0–0.2)
BASOPHILS # BLD AUTO: 0.03 K/UL (ref 0–0.2)
BASOPHILS NFR BLD: 0.1 % (ref 0–1.9)
BILIRUB DIRECT SERPL-MCNC: 6.6 MG/DL (ref 0.1–0.3)
BILIRUB SERPL-MCNC: 10.4 MG/DL (ref 0.1–1)
BILIRUB SERPL-MCNC: 8.5 MG/DL (ref 0.1–1)
BILIRUB SERPL-MCNC: 8.8 MG/DL (ref 0.1–1)
BILIRUB SERPL-MCNC: 9.2 MG/DL (ref 0.1–1)
BILIRUB SERPL-MCNC: 9.7 MG/DL (ref 0.1–1)
BNP SERPL-MCNC: 955 PG/ML (ref 0–99)
BUN SERPL-MCNC: 77 MG/DL (ref 6–20)
BUN SERPL-MCNC: 77 MG/DL (ref 6–20)
BUN SERPL-MCNC: 78 MG/DL (ref 6–20)
BUN SERPL-MCNC: 78 MG/DL (ref 6–20)
CA-I BLDV-SCNC: 1.09 MMOL/L (ref 1.06–1.42)
CALCIUM SERPL-MCNC: 8.5 MG/DL (ref 8.7–10.5)
CALCIUM SERPL-MCNC: 8.5 MG/DL (ref 8.7–10.5)
CALCIUM SERPL-MCNC: 8.9 MG/DL (ref 8.7–10.5)
CALCIUM SERPL-MCNC: 9 MG/DL (ref 8.7–10.5)
CHLORIDE SERPL-SCNC: 104 MMOL/L (ref 95–110)
CHLORIDE SERPL-SCNC: 106 MMOL/L (ref 95–110)
CHLORIDE SERPL-SCNC: 106 MMOL/L (ref 95–110)
CHLORIDE SERPL-SCNC: 107 MMOL/L (ref 95–110)
CO2 SERPL-SCNC: 24 MMOL/L (ref 23–29)
CO2 SERPL-SCNC: 26 MMOL/L (ref 23–29)
CO2 SERPL-SCNC: 27 MMOL/L (ref 23–29)
CO2 SERPL-SCNC: 28 MMOL/L (ref 23–29)
CREAT SERPL-MCNC: 3.5 MG/DL (ref 0.5–1.4)
CREAT SERPL-MCNC: 3.7 MG/DL (ref 0.5–1.4)
CREAT SERPL-MCNC: 4 MG/DL (ref 0.5–1.4)
CREAT SERPL-MCNC: 4.1 MG/DL (ref 0.5–1.4)
CRP SERPL-MCNC: 217.2 MG/L (ref 0–8.2)
DIFFERENTIAL METHOD: ABNORMAL
EOSINOPHIL # BLD AUTO: 0 K/UL (ref 0–0.5)
EOSINOPHIL NFR BLD: 0 % (ref 0–8)
EOSINOPHIL NFR BLD: 0.1 % (ref 0–8)
ERYTHROCYTE [DISTWIDTH] IN BLOOD BY AUTOMATED COUNT: 15.6 % (ref 11.5–14.5)
ERYTHROCYTE [DISTWIDTH] IN BLOOD BY AUTOMATED COUNT: 15.6 % (ref 11.5–14.5)
ERYTHROCYTE [DISTWIDTH] IN BLOOD BY AUTOMATED COUNT: 15.8 % (ref 11.5–14.5)
ERYTHROCYTE [DISTWIDTH] IN BLOOD BY AUTOMATED COUNT: 15.9 % (ref 11.5–14.5)
EST. GFR  (AFRICAN AMERICAN): 17.7 ML/MIN/1.73 M^2
EST. GFR  (AFRICAN AMERICAN): 18.2 ML/MIN/1.73 M^2
EST. GFR  (AFRICAN AMERICAN): 20.1 ML/MIN/1.73 M^2
EST. GFR  (AFRICAN AMERICAN): 21.4 ML/MIN/1.73 M^2
EST. GFR  (NON AFRICAN AMERICAN): 15.3 ML/MIN/1.73 M^2
EST. GFR  (NON AFRICAN AMERICAN): 15.8 ML/MIN/1.73 M^2
EST. GFR  (NON AFRICAN AMERICAN): 17.3 ML/MIN/1.73 M^2
EST. GFR  (NON AFRICAN AMERICAN): 18.5 ML/MIN/1.73 M^2
FIBRINOGEN PPP-MCNC: 650 MG/DL (ref 182–400)
GLUCOSE SERPL-MCNC: 112 MG/DL (ref 70–110)
GLUCOSE SERPL-MCNC: 122 MG/DL (ref 70–110)
GLUCOSE SERPL-MCNC: 129 MG/DL (ref 70–110)
GLUCOSE SERPL-MCNC: 140 MG/DL (ref 70–110)
HCO3 UR-SCNC: 25.6 MMOL/L (ref 24–28)
HCO3 UR-SCNC: 25.9 MMOL/L (ref 24–28)
HCO3 UR-SCNC: 26.5 MMOL/L (ref 24–28)
HCO3 UR-SCNC: 27.8 MMOL/L (ref 24–28)
HCO3 UR-SCNC: 27.9 MMOL/L (ref 24–28)
HCO3 UR-SCNC: 28.6 MMOL/L (ref 24–28)
HCO3 UR-SCNC: 29.1 MMOL/L (ref 24–28)
HCO3 UR-SCNC: 29.8 MMOL/L (ref 24–28)
HCO3 UR-SCNC: 30.3 MMOL/L (ref 24–28)
HCO3 UR-SCNC: 30.5 MMOL/L (ref 24–28)
HCO3 UR-SCNC: 31.3 MMOL/L (ref 24–28)
HCO3 UR-SCNC: 34.5 MMOL/L (ref 24–28)
HCT VFR BLD AUTO: 25.4 % (ref 40–54)
HCT VFR BLD AUTO: 25.7 % (ref 40–54)
HCT VFR BLD AUTO: 26 % (ref 40–54)
HCT VFR BLD AUTO: 26.1 % (ref 40–54)
HCT VFR BLD CALC: 24 %PCV (ref 36–54)
HCT VFR BLD CALC: 25 %PCV (ref 36–54)
HCT VFR BLD CALC: 26 %PCV (ref 36–54)
HCT VFR BLD CALC: 31 %PCV (ref 36–54)
HCT VFR BLD CALC: 33 %PCV (ref 36–54)
HGB BLD-MCNC: 8.3 G/DL (ref 14–18)
HGB BLD-MCNC: 8.3 G/DL (ref 14–18)
HGB BLD-MCNC: 8.4 G/DL (ref 14–18)
HGB BLD-MCNC: 8.7 G/DL (ref 14–18)
IMM GRANULOCYTES # BLD AUTO: 0.13 K/UL (ref 0–0.04)
IMM GRANULOCYTES # BLD AUTO: 0.13 K/UL (ref 0–0.04)
IMM GRANULOCYTES # BLD AUTO: 0.14 K/UL (ref 0–0.04)
IMM GRANULOCYTES # BLD AUTO: 0.15 K/UL (ref 0–0.04)
IMM GRANULOCYTES NFR BLD AUTO: 0.5 % (ref 0–0.5)
IMM GRANULOCYTES NFR BLD AUTO: 0.6 % (ref 0–0.5)
IMM GRANULOCYTES NFR BLD AUTO: 0.7 % (ref 0–0.5)
IMM GRANULOCYTES NFR BLD AUTO: 0.8 % (ref 0–0.5)
INR PPP: 1.1 (ref 0.8–1.2)
LDH SERPL L TO P-CCNC: 0.94 MMOL/L (ref 0.36–1.25)
LDH SERPL L TO P-CCNC: 0.95 MMOL/L (ref 0.36–1.25)
LDH SERPL L TO P-CCNC: 0.98 MMOL/L (ref 0.36–1.25)
LDH SERPL L TO P-CCNC: 1.04 MMOL/L (ref 0.36–1.25)
LDH SERPL L TO P-CCNC: 1.07 MMOL/L (ref 0.36–1.25)
LDH SERPL L TO P-CCNC: 1.09 MMOL/L (ref 0.36–1.25)
LDH SERPL L TO P-CCNC: 720 U/L (ref 110–260)
LYMPHOCYTES # BLD AUTO: 0.6 K/UL (ref 1–4.8)
LYMPHOCYTES # BLD AUTO: 0.6 K/UL (ref 1–4.8)
LYMPHOCYTES # BLD AUTO: 0.9 K/UL (ref 1–4.8)
LYMPHOCYTES # BLD AUTO: 1 K/UL (ref 1–4.8)
LYMPHOCYTES NFR BLD: 3.1 % (ref 18–48)
LYMPHOCYTES NFR BLD: 3.3 % (ref 18–48)
LYMPHOCYTES NFR BLD: 3.4 % (ref 18–48)
LYMPHOCYTES NFR BLD: 4.5 % (ref 18–48)
MAGNESIUM SERPL-MCNC: 2.3 MG/DL (ref 1.6–2.6)
MAGNESIUM SERPL-MCNC: 2.4 MG/DL (ref 1.6–2.6)
MCH RBC QN AUTO: 28.5 PG (ref 27–31)
MCH RBC QN AUTO: 29 PG (ref 27–31)
MCH RBC QN AUTO: 29.1 PG (ref 27–31)
MCH RBC QN AUTO: 29.3 PG (ref 27–31)
MCHC RBC AUTO-ENTMCNC: 31.9 G/DL (ref 32–36)
MCHC RBC AUTO-ENTMCNC: 32.3 G/DL (ref 32–36)
MCHC RBC AUTO-ENTMCNC: 33.1 G/DL (ref 32–36)
MCHC RBC AUTO-ENTMCNC: 33.3 G/DL (ref 32–36)
MCV RBC AUTO: 86 FL (ref 82–98)
MCV RBC AUTO: 89 FL (ref 82–98)
MCV RBC AUTO: 90 FL (ref 82–98)
MCV RBC AUTO: 91 FL (ref 82–98)
METHEMOGLOBIN: 0.3 % (ref 0–3)
MONOCYTES # BLD AUTO: 1.5 K/UL (ref 0.3–1)
MONOCYTES # BLD AUTO: 1.6 K/UL (ref 0.3–1)
MONOCYTES NFR BLD: 5.8 % (ref 4–15)
MONOCYTES NFR BLD: 7.2 % (ref 4–15)
MONOCYTES NFR BLD: 7.7 % (ref 4–15)
MONOCYTES NFR BLD: 7.9 % (ref 4–15)
NEUTROPHILS # BLD AUTO: 16.8 K/UL (ref 1.8–7.7)
NEUTROPHILS # BLD AUTO: 17 K/UL (ref 1.8–7.7)
NEUTROPHILS # BLD AUTO: 19.1 K/UL (ref 1.8–7.7)
NEUTROPHILS # BLD AUTO: 22.6 K/UL (ref 1.8–7.7)
NEUTROPHILS NFR BLD: 87.6 % (ref 38–73)
NEUTROPHILS NFR BLD: 87.8 % (ref 38–73)
NEUTROPHILS NFR BLD: 88.4 % (ref 38–73)
NEUTROPHILS NFR BLD: 90.2 % (ref 38–73)
NRBC BLD-RTO: 0 /100 WBC
OSMOLALITY UR: 365 MOSM/KG (ref 50–1200)
PCO2 BLDA: 33.9 MMHG (ref 35–45)
PCO2 BLDA: 35.9 MMHG (ref 35–45)
PCO2 BLDA: 36.1 MMHG (ref 35–45)
PCO2 BLDA: 36.2 MMHG (ref 35–45)
PCO2 BLDA: 36.5 MMHG (ref 35–45)
PCO2 BLDA: 37.4 MMHG (ref 35–45)
PCO2 BLDA: 39.9 MMHG (ref 35–45)
PCO2 BLDA: 40.3 MMHG (ref 35–45)
PCO2 BLDA: 40.4 MMHG (ref 35–45)
PCO2 BLDA: 42.7 MMHG (ref 35–45)
PCO2 BLDA: 42.8 MMHG (ref 35–45)
PCO2 BLDA: 44.1 MMHG (ref 35–45)
PH SMN: 7.43 [PH] (ref 7.35–7.45)
PH SMN: 7.45 [PH] (ref 7.35–7.45)
PH SMN: 7.46 [PH] (ref 7.35–7.45)
PH SMN: 7.47 [PH] (ref 7.35–7.45)
PH SMN: 7.48 [PH] (ref 7.35–7.45)
PH SMN: 7.48 [PH] (ref 7.35–7.45)
PH SMN: 7.5 [PH] (ref 7.35–7.45)
PH SMN: 7.51 [PH] (ref 7.35–7.45)
PH SMN: 7.53 [PH] (ref 7.35–7.45)
PH SMN: 7.53 [PH] (ref 7.35–7.45)
PHOSPHATE SERPL-MCNC: 4.1 MG/DL (ref 2.7–4.5)
PHOSPHATE SERPL-MCNC: 4.3 MG/DL (ref 2.7–4.5)
PHOSPHATE SERPL-MCNC: 4.5 MG/DL (ref 2.7–4.5)
PHOSPHATE SERPL-MCNC: 5.8 MG/DL (ref 2.7–4.5)
PLATELET # BLD AUTO: 75 K/UL (ref 150–450)
PLATELET # BLD AUTO: 82 K/UL (ref 150–450)
PLATELET # BLD AUTO: 84 K/UL (ref 150–450)
PLATELET # BLD AUTO: 84 K/UL (ref 150–450)
PMV BLD AUTO: 11.6 FL (ref 9.2–12.9)
PMV BLD AUTO: 12.1 FL (ref 9.2–12.9)
PMV BLD AUTO: 12.3 FL (ref 9.2–12.9)
PMV BLD AUTO: 12.4 FL (ref 9.2–12.9)
PO2 BLDA: 100 MMHG (ref 80–100)
PO2 BLDA: 109 MMHG (ref 80–100)
PO2 BLDA: 43 MMHG (ref 40–60)
PO2 BLDA: 45 MMHG (ref 40–60)
PO2 BLDA: 478 MMHG (ref 80–100)
PO2 BLDA: 497 MMHG (ref 80–100)
PO2 BLDA: 53 MMHG (ref 80–100)
PO2 BLDA: 66 MMHG (ref 80–100)
PO2 BLDA: 67 MMHG (ref 80–100)
PO2 BLDA: 77 MMHG (ref 80–100)
PO2 BLDA: 78 MMHG (ref 80–100)
PO2 BLDA: 88 MMHG (ref 80–100)
POC BE: 12 MMOL/L
POC BE: 2 MMOL/L
POC BE: 4 MMOL/L
POC BE: 5 MMOL/L
POC BE: 5 MMOL/L
POC BE: 6 MMOL/L
POC BE: 8 MMOL/L
POC BE: 8 MMOL/L
POC IONIZED CALCIUM: 1.09 MMOL/L (ref 1.06–1.42)
POC IONIZED CALCIUM: 1.11 MMOL/L (ref 1.06–1.42)
POC IONIZED CALCIUM: 1.12 MMOL/L (ref 1.06–1.42)
POC IONIZED CALCIUM: 1.13 MMOL/L (ref 1.06–1.42)
POC IONIZED CALCIUM: 1.14 MMOL/L (ref 1.06–1.42)
POC SATURATED O2: 100 % (ref 95–100)
POC SATURATED O2: 100 % (ref 95–100)
POC SATURATED O2: 82 % (ref 95–100)
POC SATURATED O2: 82 % (ref 95–100)
POC SATURATED O2: 91 % (ref 95–100)
POC SATURATED O2: 95 % (ref 95–100)
POC SATURATED O2: 95 % (ref 95–100)
POC SATURATED O2: 96 % (ref 95–100)
POC SATURATED O2: 96 % (ref 95–100)
POC SATURATED O2: 97 % (ref 95–100)
POC SATURATED O2: 98 % (ref 95–100)
POC SATURATED O2: 98 % (ref 95–100)
POC TCO2: 27 MMOL/L (ref 23–27)
POC TCO2: 27 MMOL/L (ref 23–27)
POC TCO2: 28 MMOL/L (ref 24–29)
POC TCO2: 29 MMOL/L (ref 23–27)
POC TCO2: 29 MMOL/L (ref 23–27)
POC TCO2: 30 MMOL/L (ref 23–27)
POC TCO2: 30 MMOL/L (ref 24–29)
POC TCO2: 31 MMOL/L (ref 23–27)
POC TCO2: 31 MMOL/L (ref 23–27)
POC TCO2: 32 MMOL/L (ref 23–27)
POC TCO2: 33 MMOL/L (ref 23–27)
POC TCO2: 36 MMOL/L (ref 23–27)
POCT GLUCOSE: 102 MG/DL (ref 70–110)
POCT GLUCOSE: 107 MG/DL (ref 70–110)
POCT GLUCOSE: 111 MG/DL (ref 70–110)
POCT GLUCOSE: 114 MG/DL (ref 70–110)
POCT GLUCOSE: 114 MG/DL (ref 70–110)
POCT GLUCOSE: 118 MG/DL (ref 70–110)
POCT GLUCOSE: 118 MG/DL (ref 70–110)
POCT GLUCOSE: 121 MG/DL (ref 70–110)
POCT GLUCOSE: 122 MG/DL (ref 70–110)
POCT GLUCOSE: 130 MG/DL (ref 70–110)
POCT GLUCOSE: 131 MG/DL (ref 70–110)
POCT GLUCOSE: 138 MG/DL (ref 70–110)
POCT GLUCOSE: 75 MG/DL (ref 70–110)
POCT GLUCOSE: 86 MG/DL (ref 70–110)
POCT GLUCOSE: 92 MG/DL (ref 70–110)
POTASSIUM BLD-SCNC: 3.5 MMOL/L (ref 3.5–5.1)
POTASSIUM BLD-SCNC: 3.6 MMOL/L (ref 3.5–5.1)
POTASSIUM BLD-SCNC: 3.6 MMOL/L (ref 3.5–5.1)
POTASSIUM BLD-SCNC: 3.7 MMOL/L (ref 3.5–5.1)
POTASSIUM BLD-SCNC: 3.8 MMOL/L (ref 3.5–5.1)
POTASSIUM BLD-SCNC: 3.8 MMOL/L (ref 3.5–5.1)
POTASSIUM BLD-SCNC: 3.9 MMOL/L (ref 3.5–5.1)
POTASSIUM SERPL-SCNC: 3.8 MMOL/L (ref 3.5–5.1)
POTASSIUM SERPL-SCNC: 3.9 MMOL/L (ref 3.5–5.1)
POTASSIUM SERPL-SCNC: 3.9 MMOL/L (ref 3.5–5.1)
POTASSIUM SERPL-SCNC: 4.3 MMOL/L (ref 3.5–5.1)
POTASSIUM UR-SCNC: 24 MMOL/L (ref 15–95)
PREALB SERPL-MCNC: 11 MG/DL (ref 20–43)
PROT SERPL-MCNC: 4.5 G/DL (ref 6–8.4)
PROT SERPL-MCNC: 5.1 G/DL (ref 6–8.4)
PROT SERPL-MCNC: 5.2 G/DL (ref 6–8.4)
PROT SERPL-MCNC: 5.3 G/DL (ref 6–8.4)
PROT SERPL-MCNC: 5.3 G/DL (ref 6–8.4)
PROTHROMBIN TIME: 11.3 SEC (ref 9–12.5)
PROTHROMBIN TIME: 11.3 SEC (ref 9–12.5)
PROTHROMBIN TIME: 11.4 SEC (ref 9–12.5)
PROTHROMBIN TIME: 11.5 SEC (ref 9–12.5)
RBC # BLD AUTO: 2.85 M/UL (ref 4.6–6.2)
RBC # BLD AUTO: 2.86 M/UL (ref 4.6–6.2)
RBC # BLD AUTO: 2.87 M/UL (ref 4.6–6.2)
RBC # BLD AUTO: 3.05 M/UL (ref 4.6–6.2)
SAMPLE: ABNORMAL
SAMPLE: NORMAL
SODIUM BLD-SCNC: 144 MMOL/L (ref 136–145)
SODIUM BLD-SCNC: 145 MMOL/L (ref 136–145)
SODIUM BLD-SCNC: 146 MMOL/L (ref 136–145)
SODIUM BLD-SCNC: 147 MMOL/L (ref 136–145)
SODIUM SERPL-SCNC: 143 MMOL/L (ref 136–145)
SODIUM SERPL-SCNC: 144 MMOL/L (ref 136–145)
SODIUM SERPL-SCNC: 145 MMOL/L (ref 136–145)
SODIUM SERPL-SCNC: 147 MMOL/L (ref 136–145)
SODIUM UR-SCNC: 119 MMOL/L (ref 20–250)
WBC # BLD AUTO: 19.14 K/UL (ref 3.9–12.7)
WBC # BLD AUTO: 19.18 K/UL (ref 3.9–12.7)
WBC # BLD AUTO: 21.83 K/UL (ref 3.9–12.7)
WBC # BLD AUTO: 25.07 K/UL (ref 3.9–12.7)

## 2022-07-17 PROCEDURE — 99291 PR CRITICAL CARE, E/M 30-74 MINUTES: ICD-10-PCS | Mod: ,,, | Performed by: ANESTHESIOLOGY

## 2022-07-17 PROCEDURE — 84295 ASSAY OF SERUM SODIUM: CPT

## 2022-07-17 PROCEDURE — 85730 THROMBOPLASTIN TIME PARTIAL: CPT | Mod: 91 | Performed by: STUDENT IN AN ORGANIZED HEALTH CARE EDUCATION/TRAINING PROGRAM

## 2022-07-17 PROCEDURE — 82330 ASSAY OF CALCIUM: CPT | Performed by: THORACIC SURGERY (CARDIOTHORACIC VASCULAR SURGERY)

## 2022-07-17 PROCEDURE — 99233 SBSQ HOSP IP/OBS HIGH 50: CPT | Mod: ,,, | Performed by: INTERNAL MEDICINE

## 2022-07-17 PROCEDURE — 83605 ASSAY OF LACTIC ACID: CPT

## 2022-07-17 PROCEDURE — 33949 ECMO/ECLS DAILY MGMT ARTERY: CPT

## 2022-07-17 PROCEDURE — 25000003 PHARM REV CODE 250

## 2022-07-17 PROCEDURE — 63600175 PHARM REV CODE 636 W HCPCS: Performed by: STUDENT IN AN ORGANIZED HEALTH CARE EDUCATION/TRAINING PROGRAM

## 2022-07-17 PROCEDURE — 27000221 HC OXYGEN, UP TO 24 HOURS

## 2022-07-17 PROCEDURE — 27000685 HC LVAD KIT (30 DAY SUPPLY)

## 2022-07-17 PROCEDURE — 85610 PROTHROMBIN TIME: CPT | Mod: 91 | Performed by: STUDENT IN AN ORGANIZED HEALTH CARE EDUCATION/TRAINING PROGRAM

## 2022-07-17 PROCEDURE — 83615 LACTATE (LD) (LDH) ENZYME: CPT | Performed by: STUDENT IN AN ORGANIZED HEALTH CARE EDUCATION/TRAINING PROGRAM

## 2022-07-17 PROCEDURE — 80076 HEPATIC FUNCTION PANEL: CPT | Performed by: STUDENT IN AN ORGANIZED HEALTH CARE EDUCATION/TRAINING PROGRAM

## 2022-07-17 PROCEDURE — 85014 HEMATOCRIT: CPT

## 2022-07-17 PROCEDURE — 63600175 PHARM REV CODE 636 W HCPCS

## 2022-07-17 PROCEDURE — 83735 ASSAY OF MAGNESIUM: CPT | Performed by: STUDENT IN AN ORGANIZED HEALTH CARE EDUCATION/TRAINING PROGRAM

## 2022-07-17 PROCEDURE — 63600175 PHARM REV CODE 636 W HCPCS: Performed by: THORACIC SURGERY (CARDIOTHORACIC VASCULAR SURGERY)

## 2022-07-17 PROCEDURE — 80053 COMPREHEN METABOLIC PANEL: CPT | Mod: 91 | Performed by: STUDENT IN AN ORGANIZED HEALTH CARE EDUCATION/TRAINING PROGRAM

## 2022-07-17 PROCEDURE — 36592 COLLECT BLOOD FROM PICC: CPT

## 2022-07-17 PROCEDURE — 82803 BLOOD GASES ANY COMBINATION: CPT

## 2022-07-17 PROCEDURE — 84100 ASSAY OF PHOSPHORUS: CPT | Mod: 91 | Performed by: STUDENT IN AN ORGANIZED HEALTH CARE EDUCATION/TRAINING PROGRAM

## 2022-07-17 PROCEDURE — 83935 ASSAY OF URINE OSMOLALITY: CPT | Performed by: INTERNAL MEDICINE

## 2022-07-17 PROCEDURE — 20000000 HC ICU ROOM

## 2022-07-17 PROCEDURE — 99900026 HC AIRWAY MAINTENANCE (STAT)

## 2022-07-17 PROCEDURE — 99233 PR SUBSEQUENT HOSPITAL CARE,LEVL III: ICD-10-PCS | Mod: ,,, | Performed by: INTERNAL MEDICINE

## 2022-07-17 PROCEDURE — 84133 ASSAY OF URINE POTASSIUM: CPT | Performed by: INTERNAL MEDICINE

## 2022-07-17 PROCEDURE — 84134 ASSAY OF PREALBUMIN: CPT | Performed by: STUDENT IN AN ORGANIZED HEALTH CARE EDUCATION/TRAINING PROGRAM

## 2022-07-17 PROCEDURE — 25000003 PHARM REV CODE 250: Performed by: STUDENT IN AN ORGANIZED HEALTH CARE EDUCATION/TRAINING PROGRAM

## 2022-07-17 PROCEDURE — 82330 ASSAY OF CALCIUM: CPT

## 2022-07-17 PROCEDURE — 99900035 HC TECH TIME PER 15 MIN (STAT)

## 2022-07-17 PROCEDURE — 84132 ASSAY OF SERUM POTASSIUM: CPT

## 2022-07-17 PROCEDURE — 99291 CRITICAL CARE FIRST HOUR: CPT | Mod: ,,, | Performed by: ANESTHESIOLOGY

## 2022-07-17 PROCEDURE — 83050 HGB METHEMOGLOBIN QUAN: CPT

## 2022-07-17 PROCEDURE — 37799 UNLISTED PX VASCULAR SURGERY: CPT

## 2022-07-17 PROCEDURE — 85025 COMPLETE CBC W/AUTO DIFF WBC: CPT | Mod: 91 | Performed by: STUDENT IN AN ORGANIZED HEALTH CARE EDUCATION/TRAINING PROGRAM

## 2022-07-17 PROCEDURE — 85384 FIBRINOGEN ACTIVITY: CPT | Performed by: THORACIC SURGERY (CARDIOTHORACIC VASCULAR SURGERY)

## 2022-07-17 PROCEDURE — 94003 VENT MGMT INPAT SUBQ DAY: CPT

## 2022-07-17 PROCEDURE — 84300 ASSAY OF URINE SODIUM: CPT | Performed by: INTERNAL MEDICINE

## 2022-07-17 PROCEDURE — 63600367 HC NITRIC OXIDE PER HOUR

## 2022-07-17 PROCEDURE — 25000003 PHARM REV CODE 250: Performed by: THORACIC SURGERY (CARDIOTHORACIC VASCULAR SURGERY)

## 2022-07-17 PROCEDURE — 27000248 HC VAD-ADDITIONAL DAY

## 2022-07-17 PROCEDURE — C9113 INJ PANTOPRAZOLE SODIUM, VIA: HCPCS

## 2022-07-17 PROCEDURE — 94761 N-INVAS EAR/PLS OXIMETRY MLT: CPT

## 2022-07-17 PROCEDURE — 86140 C-REACTIVE PROTEIN: CPT | Performed by: STUDENT IN AN ORGANIZED HEALTH CARE EDUCATION/TRAINING PROGRAM

## 2022-07-17 PROCEDURE — 83880 ASSAY OF NATRIURETIC PEPTIDE: CPT | Performed by: STUDENT IN AN ORGANIZED HEALTH CARE EDUCATION/TRAINING PROGRAM

## 2022-07-17 RX ORDER — HYDROMORPHONE HYDROCHLORIDE 1 MG/ML
0.5 INJECTION, SOLUTION INTRAMUSCULAR; INTRAVENOUS; SUBCUTANEOUS ONCE
Status: COMPLETED | OUTPATIENT
Start: 2022-07-17 | End: 2022-07-17

## 2022-07-17 RX ORDER — POTASSIUM CHLORIDE 29.8 MG/ML
40 INJECTION INTRAVENOUS ONCE
Status: COMPLETED | OUTPATIENT
Start: 2022-07-17 | End: 2022-07-17

## 2022-07-17 RX ORDER — HYDROMORPHONE HYDROCHLORIDE 1 MG/ML
0.5 INJECTION, SOLUTION INTRAMUSCULAR; INTRAVENOUS; SUBCUTANEOUS EVERY 4 HOURS PRN
Status: DISCONTINUED | OUTPATIENT
Start: 2022-07-17 | End: 2022-09-01 | Stop reason: HOSPADM

## 2022-07-17 RX ORDER — HYDROMORPHONE HYDROCHLORIDE 1 MG/ML
1 INJECTION, SOLUTION INTRAMUSCULAR; INTRAVENOUS; SUBCUTANEOUS EVERY 4 HOURS PRN
Status: DISCONTINUED | OUTPATIENT
Start: 2022-07-17 | End: 2022-09-01 | Stop reason: HOSPADM

## 2022-07-17 RX ADMIN — POTASSIUM CHLORIDE 40 MEQ: 29.8 INJECTION, SOLUTION INTRAVENOUS at 10:07

## 2022-07-17 RX ADMIN — HYDROMORPHONE HYDROCHLORIDE 0.5 MG: 1 INJECTION, SOLUTION INTRAMUSCULAR; INTRAVENOUS; SUBCUTANEOUS at 03:07

## 2022-07-17 RX ADMIN — DEXMEDETOMIDINE HYDROCHLORIDE 1.3 MCG/KG/HR: 4 INJECTION INTRAVENOUS at 11:07

## 2022-07-17 RX ADMIN — FUROSEMIDE 7.5 MG/HR: 10 INJECTION, SOLUTION INTRAMUSCULAR; INTRAVENOUS at 03:07

## 2022-07-17 RX ADMIN — POTASSIUM CHLORIDE 40 MEQ: 29.8 INJECTION, SOLUTION INTRAVENOUS at 05:07

## 2022-07-17 RX ADMIN — DEXMEDETOMIDINE HYDROCHLORIDE 1.4 MCG/KG/HR: 4 INJECTION INTRAVENOUS at 01:07

## 2022-07-17 RX ADMIN — DEXMEDETOMIDINE HYDROCHLORIDE 1.3 MCG/KG/HR: 4 INJECTION INTRAVENOUS at 06:07

## 2022-07-17 RX ADMIN — MUPIROCIN: 20 OINTMENT TOPICAL at 09:07

## 2022-07-17 RX ADMIN — DEXMEDETOMIDINE HYDROCHLORIDE 1.1 MCG/KG/HR: 4 INJECTION INTRAVENOUS at 08:07

## 2022-07-17 RX ADMIN — HYDROMORPHONE HYDROCHLORIDE 1 MG: 1 INJECTION, SOLUTION INTRAMUSCULAR; INTRAVENOUS; SUBCUTANEOUS at 05:07

## 2022-07-17 RX ADMIN — DEXMEDETOMIDINE HYDROCHLORIDE 1.1 MCG/KG/HR: 4 INJECTION INTRAVENOUS at 03:07

## 2022-07-17 RX ADMIN — DEXMEDETOMIDINE HYDROCHLORIDE 1.4 MCG/KG/HR: 4 INJECTION INTRAVENOUS at 04:07

## 2022-07-17 RX ADMIN — HYDROMORPHONE HYDROCHLORIDE 1 MG: 1 INJECTION, SOLUTION INTRAMUSCULAR; INTRAVENOUS; SUBCUTANEOUS at 02:07

## 2022-07-17 RX ADMIN — PANTOPRAZOLE SODIUM 40 MG: 40 INJECTION, POWDER, FOR SOLUTION INTRAVENOUS at 09:07

## 2022-07-17 RX ADMIN — EPINEPHRINE 0.06 MCG/KG/MIN: 1 INJECTION INTRAMUSCULAR; INTRAVENOUS; SUBCUTANEOUS at 03:07

## 2022-07-17 RX ADMIN — HEPARIN SODIUM 600 UNITS/HR: 5000 INJECTION INTRAVENOUS; SUBCUTANEOUS at 07:07

## 2022-07-17 RX ADMIN — DEXTROSE 500 MG: 50 INJECTION, SOLUTION INTRAVENOUS at 12:07

## 2022-07-17 RX ADMIN — DEXMEDETOMIDINE HYDROCHLORIDE 1.2 MCG/KG/HR: 4 INJECTION INTRAVENOUS at 09:07

## 2022-07-17 RX ADMIN — HYDROMORPHONE HYDROCHLORIDE 0.5 MG: 1 INJECTION, SOLUTION INTRAMUSCULAR; INTRAVENOUS; SUBCUTANEOUS at 09:07

## 2022-07-17 RX ADMIN — HYDROMORPHONE HYDROCHLORIDE 1 MG: 1 INJECTION, SOLUTION INTRAMUSCULAR; INTRAVENOUS; SUBCUTANEOUS at 07:07

## 2022-07-17 RX ADMIN — INSULIN HUMAN 1.3 UNITS/HR: 1 INJECTION, SOLUTION INTRAVENOUS at 03:07

## 2022-07-17 RX ADMIN — LEVOTHYROXINE SODIUM 112 MCG: 112 TABLET ORAL at 05:07

## 2022-07-17 NOTE — ASSESSMENT & PLAN NOTE
-Ischemic ATN 2/2 decrease perfusion severe hypotension in a patient with known CKD 3b  Baseline sCr: 1.9-2.3  On admission his Cr was: 2.5  Lab Results   Component Value Date    CREATININE 4.1 (H) 07/17/2022     7/15 urine microscopy showed: many granular casts, muddy brown casts, waxy casts, some WBCS, some yeast, and occasional RBCs    Recommendations:   -Continued on Lasix infusion at 10 mg/hr for now  -Electrolytes: replace as necessary, renal diet, page nephrology if K >5.5   · For hyperK: N/A   Phos level 5.8, continue phos binders Sevelamer 800mg tid IF not NPO   Mg 2.4, goal >2  -Acid/base: AGMA 2/2 ATN  -Fluid balance: fluid overloaded   -Anemia: Hgb 8.3, send anemia panel labs, transfuse for Hgb <7.0  -Strict I/O's and daily weights  -Renal function panel and Mg levels Q8H   -Check uric acid, CK level, lactic acid, TSH, and urine labs (ordered)  -check Renal ultrasound   -Maintain MAP >65 for renal perfusion    There is no immediate indication for KRT at this time

## 2022-07-17 NOTE — ASSESSMENT & PLAN NOTE
Tim Richards is a 56 y.o. male HFrEF with an EF of 10% and a history of HM2 LVAD in 2018 who is now s/p HM3 upgrade, initiation of V-A ECMO after failure to wean off bypass x2      Neuro/Psych:   -- Sedation: Precedex.  -- Pain: PRN Dilaudid             Cards:   -- S/P LVAD exchange on 7/14/22  --Improving pressor requirements, consider weaning vaso   Epi 0.06   Levo off   Vaso 0.04   Dopamine off   Giapreza off  -- Stress dose steroids complete  -- Ventricular pacing wires.  -- MAP goal 75  -- VAD and ECMO flows stable, consider trial of minimal ECMO support today  -- Sternal closure on 7/15      Pulm:   -- Goal O2 sat > 90%  -- ABG PRN  -- Mediastinal chest tubes x2 and pleural chest tube x2 to suction  -- Nitric at 5 ppm      Renal:  -- Keep sun for strict I/O  -- Trend BUN/Cr 77/4.1 <--55/4.2 <-- 31/3.5 <-- 20/2.4  -- Lasix drip at 10  -- Goal negative 1-2L      FEN / GI:   -- Replace lytes as needed  -- Nutrition: NPO  -- GI ppx: famotidine      ID:   -- Tm: afebrile; WBC stable  -- ABX complete      Heme/Onc:   -- H/H stable   -- Daily CBC  -- Received 18 pRBCs, 16 FFP, 2 plt, 25 cryo; 1500 albumin intra-op  -- No product requirement since sternal closure  -- Heparin gtt at 600      Endo:   -- BG goal 140-180  -- Insulin gtt      PPx:   Feeding: NPO  Analgesia/Sedation: Precedex / PRN Dilaudid  Thromboembolic prevention: SCDs  HOB >30: Yes  Stress Ulcer ppx: famotidine  Glucose control: Critical care goal 140-180 g/dl, ISS     Lines/Drains/Airway: ETT; OG; ECMO canula x2; RIJ trialysis, LIJ CVC and gertrude, r-radial a-line, Chest tube x4; sun; WV, VAD; midline      Dispo/Code Status/Palliative:   -- SICU / Full Code.

## 2022-07-17 NOTE — SUBJECTIVE & OBJECTIVE
Interval History/Significant Events: No acute events overnight. Hemodynamically stable on 0.06 epi, 0.04 vaso. Giapreza and levo weaned off yesterday. Lsaix at 10 with UOP of 200-300/hr. Stable flows on VAD and ECMO.    Follow-up For: Procedure(s) (LRB):  CLOSURE, WOUND, STERNUM (N/A)  APPLICATION, WOUND VAC (N/A)  INSERTION, GRAFT, PERICARDIUM (N/A)  IRRIGATION, MEDIASTINUM (N/A)    Post-Operative Day: 2 Days Post-Op    Objective:     Vital Signs (Most Recent):  Temp: 98.24 °F (36.8 °C) (07/17/22 0509)  Pulse: 98 (07/17/22 0509)  Resp: 14 (07/17/22 0524)  BP: (!) 80/0 (07/17/22 0315)  SpO2: 100 % (07/16/22 1505)   Vital Signs (24h Range):  Temp:  [98.06 °F (36.7 °C)-98.24 °F (36.8 °C)] 98.24 °F (36.8 °C)  Pulse:  [] 98  Resp:  [14-15] 14  SpO2:  [100 %] 100 %  BP: (78-82)/(0) 80/0  Arterial Line BP: (80-89)/(67-77) 83/70     Weight: 118.8 kg (262 lb)  Body mass index is 34.57 kg/m².      Intake/Output Summary (Last 24 hours) at 7/17/2022 0543  Last data filed at 7/17/2022 0500  Gross per 24 hour   Intake 1840.74 ml   Output 5823 ml   Net -3982.26 ml       Physical Exam  Constitutional:       Comments: Intubated and sedated   HENT:      Head: Normocephalic and atraumatic.      Mouth/Throat:      Comments: OG in place  Eyes:      Pupils: Pupils are equal, round, and reactive to light.   Neck:      Comments: L IJ CVC  R IJ trialysis  Cardiovascular:      Rate and Rhythm: Regular rhythm. Tachycardia present.      Comments: On V-A ECMO -- 4000 rpm, 3.0L -- clots present in oxygenator  LVAD in place -- 5500 rpm, 4.0L    Midline sternotomy c/d with dressing in place    2 plerual and 2 mediastinal chest tubes to suction.    V pacing wires in place.   Pulmonary:      Comments: Mechanically ventilated  Abdominal:      General: There is no distension.      Palpations: Abdomen is soft.   Genitourinary:     Comments: Lagunas in place draining clear urine  Musculoskeletal:      Comments: ECMO cannula x2 (right femoral  artery, right femoral vein)    L brachial, right radial arterial line   Skin:     General: Skin is warm and dry.   Neurological:      Comments: Following commands when sedation paused       Vents:  Vent Mode: A/C (07/17/22 0509)  Ventilator Initiated: Yes (07/15/22 1027)  Set Rate: 14 BPM (07/17/22 0509)  Vt Set: 400 mL (07/17/22 0509)  PEEP/CPAP: 7 cmH20 (07/17/22 0509)  Oxygen Concentration (%): 50 (07/17/22 0509)  Peak Airway Pressure: 27 cmH2O (07/17/22 0509)  Plateau Pressure: 21 cmH20 (07/17/22 0509)  Total Ve: 4.9 mL (07/17/22 0509)  Negative Inspiratory Force (cm H2O): 0 (07/17/22 0509)  F/VT Ratio<105 (RSBI): (!) 36.08 (07/17/22 0042)    Lines/Drains/Airways       Central Venous Catheter Line  Duration                  ECMO Cannula 07/13/22 right femoral artery 4 days         ECMO Cannula 07/13/22 right femoral vein 4 days     Introducer with Double Lumen 07/13/22 0900 left internal jugular 3 days    Percutaneous Central Line Insertion/Assessment - Quad Lumen  07/13/22 0941 left internal jugular 3 days    Trialysis (Dialysis) Catheter 07/13/22 2100 right internal jugular 3 days              Drain  Duration                  Chest Tube 07/13/22 1916 1 Right Pleural 32 Fr. 3 days         Chest Tube 07/13/22 1916 2 Left Pleural 32 Fr. 3 days         Chest Tube 07/13/22 1916 3 Anterior Mediastinal 32 Fr. 3 days         Chest Tube 07/13/22 1916 4 Posterior Mediastinal 32 Fr. 3 days         Urethral Catheter 07/13/22 0848 Temperature probe;Latex 14 Fr. 3 days         NG/OG Tube 07/15/22 1030 Center mouth 1 day              Airway  Duration                  Airway - Non-Surgical 07/13/22 0848 3 days              Arterial Line  Duration             Arterial Line 07/13/22 0932 Left Brachial 3 days    Arterial Line 07/13/22 2000 Right Radial 3 days              Line  Duration                  VAD 03/08/18 1124 Left ventricular assist device HeartMate II 1591 days         VAD 07/13/22 1300 Left ventricular assist  device HeartMate 3 3 days              Peripheral Intravenous Line  Duration                  Midline Catheter Insertion/Assessment  - Single Lumen 06/28/22 1027 Right cephalic vein (lateral side of arm) 20g x 8cm 18 days                    Significant Labs:    CBC/Anemia Profile:  Recent Labs   Lab 07/16/22  1551 07/16/22  1607 07/16/22  2105 07/16/22  2358 07/17/22  0359 07/17/22  0404   WBC 26.52*  --  25.17*  --  25.07*  --    HGB 8.2*  --  8.0*  --  8.3*  --    HCT 24.8*   < > 24.7* 31* 25.7* 24*   PLT 73*  --  81*  --  82*  --    MCV 89  --  88  --  90  --    RDW 15.7*  --  15.6*  --  15.8*  --     < > = values in this interval not displayed.        Chemistries:  Recent Labs   Lab 07/16/22  1551 07/16/22 2105 07/17/22  0359    141 143   K 4.0 3.9 3.9    103 104   CO2 27 26 28   BUN 69* 72* 77*   CREATININE 4.2* 4.5* 4.1*   CALCIUM 8.5* 8.8 8.5*   ALBUMIN 2.1* 2.1* 2.1*  2.2*   PROT 4.9* 5.0* 5.1*  4.5*   BILITOT 8.3* 8.9* 8.5*  8.8*   ALKPHOS 90 90 94  95   ALT 84* 82* 73*  74*   * 175* 163*  166*   MG 2.4 2.6 2.4   PHOS 6.7* 6.6* 5.8*       ABGs:   Recent Labs   Lab 07/17/22  0404   PH 7.472*   PCO2 42.7   HCO3 31.3*   POCSATURATED 98   BE 8     Coagulation:   Recent Labs   Lab 07/17/22  0359   INR 1.1   APTT 30.4     Lactic Acid: No results for input(s): LACTATE in the last 48 hours.  All pertinent labs within the past 24 hours have been reviewed.    Significant Imaging:  I have reviewed all pertinent imaging results/findings within the past 24 hours.

## 2022-07-17 NOTE — PROGRESS NOTES
07/17/2022  Fitz Christianson    Current provider:  Yg Kaufman MD    Device interrogation:  TXP LVAD INTERROGATIONS 7/17/2022 7/17/2022 7/17/2022 7/17/2022 7/17/2022 7/17/2022 7/17/2022   Type HeartMate3 HeartMate3 HeartMate3 HeartMate3 HeartMate3 HeartMate3 HeartMate3   Flow 4.3 4.5 4.5 4.4 4.4 4.4 4.4   Speed 5500 5500 5500 5500 5500 5500 5500   PI 2.9 2.5 2.5 2.5 2.7 2.7 2.7   Power (Jackson) 3.9 3.9 3.9 3.9 3.9 4.0 3.9   LSL 5100 5100 5100 5100 5100 5100 5100   Pulsatility - - - - - - -          Rounded on Tim Richards to ensure all mechanical assist device settings (IABP or VAD) were appropriate and all parameters were within limits.  I was able to ensure all back up equipment was present, the staff had no issues, and the Perfusion Department daily rounding was complete.      For implantable VADs: Interrogation of Ventricular assist device was performed with analysis of device parameters and review of device function. I have personally reviewed the interrogation findings and agree with findings as stated.     In emergency, the nursing units have been notified to contact the perfusion department either by:  Calling [phone] from 630am to 4pm Mon thru Fri, utilizing the On-Call Finder functionality of Epic and searching for Perfusion, or by contacting the hospital  from 4pm to 630am and on weekends and asking to speak with the perfusionist on call.    5:01 PM

## 2022-07-17 NOTE — PROGRESS NOTES
EC[facility] Specialists shift report    Date: 07/17/2022  ECMO Specialist:  Penny Mosley    Pump parameters:  RPM: 4000  Flow:  3.4 - 3.5L  Sweep:  4L  FiO2:  100%    Oxygenator status:  Clots: yes  Fibrin: yes    Pressure trends:  P1: 272  P2: 250  Delta P: 22      Volume status:  Chugging noted?  CVP: 3 - 5  MAP: >72  MD notified (name):  Dr Kaufman    Anticoagulation:  PTT parameters: 40 - 80  PTT trends this shift: 27.8, 28    Cannula size / status / placement:  Return cannula / from circuit to patient: 18Fr RFA, @ 9.5cm  Drainage cannula / from patient to circuit: 27Fr RFV, @ 5.5cm     Additional Comments:  RPM's increased/ Dr Kaufman

## 2022-07-17 NOTE — PROGRESS NOTES
..[facility] Specialists shift report    Date: 07/17/2022  ECMO Specialist:  Pippa Horan    Pump parameters:  RPM: 4000  Flow:  3.22-3.25  Sweep:  4L  FiO2:  100%    Oxygenator status:  Clots: pre/post oxy  Fibrin: pre/post oxy    Pressure trends:  P1: 244-246  P2: 224-226  Delta P: 19-20  Negative:      Volume status:  Chugging noted none  CVP: 4-6  MAP: 78-81  MD notified (name):Amezcua/ Shae    Anticoagulation:  aPTT parameters: 40-80  aPTT trends this shift: 32/    Cannula size / status / placement:  Return cannula / from circuit to patient: RFA 18Fr 9.5cm  Drainage cannula / from patient to circuit: RFA 27Fr 5.5cm       Additional Comments:    Pt remained stable overnight.  No products given.  Clots in oxy still growing, monitored through out night.   Heparin remains at 600u.

## 2022-07-17 NOTE — PROGRESS NOTES
"[facility]  Nephrology  Progress Note    Patient Name: Tim Richards  MRN: [id number]  Admission Date: 6/27/2022  Hospital Length of Stay: 20 days  Attending Provider: Yg Kaufman MD   Primary Care Physician: Deyanira Booth MD  Principal Problem:Left ventricular assist device (LVAD) complication    Subjective:     HPI:   Tim Richards is a 56 y.o. male w/ Known CKD 3b (without prior nephrology f/u), NICM, end-stage HF, EF 10% s/p HM2 LVAD (2018) and ICD placement (2014), ventricular fibrillation (2020), GI bleed (2019), hypothyroidism, alcohol use (2014), nicotine dependence, and CHICHI amitted on 6/27/2022 for evaluation and management of decreased appetite, decreased and dark-colored urine, and increasing shortness of breath.     History obtained from EMR and family at bedside.    In the ED, pt presented with the following VS:   Initial Vitals   BP Pulse Resp Temp SpO2   06/27/22 1129 06/27/22 1038 06/27/22 1038 06/27/22 1038 06/27/22 1810   (!) 94/0 93 20 98 °F (36.7 °C) 95 %      MAP       --                Pt was found to be in acute decompensated heart failure, hypoxic, and severely volume overloaded, for which he was started on BIPAP and on lasix gtt. On 6/30 pt had ventricular tachycardia with ICD shock, this was believed to be 2/2 hypokalemia.   His LVAD was interrogated and there was a concern of LVAD thrombosis and pt was started on Integrilin,however whilke on medical management pt continue with worsening hemolysis and so CTS was consulted and recommended upgrading to HM3 and on 7/13 pt underwent HM 2 explantation, and implantation of HM 3 LVAD. Intraoperatively pt had significant vasoplegic shock and pt was placed on ECMO as well as on vasopressors and steroids for vasodilatory shock. Pt remained intubated postoperatively.   Of note, on admission pt was found to be COVID 19 + and was treated with Remdesivir.     Nephrology was consulted for: "may soon have indication " "for dialysis"  Baseline sCr: 1.9-2.3  On admission his Cr was: 2.5    On 7/13 (day of surgery) his I/Os were as follows: 20L/4.4L, net +15.6L, urine 1965mls and 2.5L from chest tube    On 7/14 to 7/15 his I/Os were: 6.4/5.5L,net 866mls, 1.6L chest tube and 4L urine         Interval History: Patient seen and examined this morning. No acute events overnight. Weaned off Levophed and continued on vasopressin this morning. Afebrile with pulse ranging from 100-90s bpm. Systolic blood pressures ranging from 80-70s from artrial line. Still intubated on PEEP 7 and FiO2 70% and UOP with ~5.8 liters in the last 24 hours. BUN 77, creatinine 4.1 and phosphorous 5.8.  Continued on Lasix infusion at 10 mg/hr per primary team.    Review of patient's allergies indicates:   Allergen Reactions    Lisinopril Anaphylaxis    Hydralazine analogues      Chronic constipation, impotence, dizziness     Current Facility-Administered Medications   Medication Frequency    0.9%  NaCl infusion PRN    dexmedetomidine (PRECEDEX) 400mcg/100mL 0.9% NaCL infusion Continuous    dextrose 10% bolus 125 mL PRN    dextrose 10% bolus 250 mL PRN    EPINEPHrine (ADRENALIN) 10 mg in dextrose 5 % 250 mL infusion Continuous    furosemide (LASIX) 200 mg in dextrose 5 % 100 mL continuous infusion (conc: 2 mg/mL) Continuous    heparin 25,000 units in dextrose 5% 250 mL (100 units/mL) infusion (heparin infusion - NO NOMOGRAM) Continuous    HYDROmorphone injection 1 mg Q4H PRN    HYDROmorphone injection 2 mg Q4H PRN    insulin regular in 0.9 % NaCl 100 unit/100 mL (1 unit/mL) infusion Continuous    levothyroxine tablet 112 mcg Before breakfast    mupirocin 2 % ointment BID    nitric oxide gas Gas 5 ppm Continuous    NORepinephrine 8 mg in dextrose 5% 250 mL infusion Continuous    pantoprazole injection 40 mg BID    propofol (DIPRIVAN) 10 mg/mL infusion Continuous    vasopressin (PITRESSIN) 1 Units/mL in dextrose 5 % 100 mL infusion Continuous "       Objective:     Vital Signs (Most Recent):  Temp: 98.24 °F (36.8 °C) (07/17/22 0545)  Pulse: 100 (07/17/22 0800)  Resp: 14 (07/17/22 0524)  BP: (!) 80/0 (07/17/22 0315)  SpO2: 100 % (07/16/22 1505)  O2 Device (Oxygen Therapy): ventilator (07/17/22 0509)   Vital Signs (24h Range):  Temp:  [98.06 °F (36.7 °C)-98.24 °F (36.8 °C)] 98.24 °F (36.8 °C)  Pulse:  [] 100  Resp:  [14-15] 14  SpO2:  [100 %] 100 %  BP: (78-82)/(0) 80/0  Arterial Line BP: (77-89)/(63-77) 86/74     Weight: 118.8 kg (262 lb) (07/17/22 0533)  Body mass index is 34.57 kg/m².  Body surface area is 2.47 meters squared.    I/O last 3 completed shifts:  In: 3005.5 [I.V.:2215.4; NG/GT:20; IV Piggyback:770.2]  Out: 7737 [Urine:6720; Blood:1; Chest Tube:1016]    Physical Exam  Vitals and nursing note reviewed.   Constitutional:       General: He is in acute distress.      Appearance: He is ill-appearing. He is not toxic-appearing or diaphoretic.   HENT:      Mouth/Throat:      Mouth: Mucous membranes are moist.   Cardiovascular:      Rate and Rhythm: Normal rate and regular rhythm.      Pulses: Normal pulses.      Heart sounds: Murmur heard.      Comments: Intubated   ECHMO catheters.   Trialysis catheter.   Pulmonary:      Effort: Pulmonary effort is normal. No respiratory distress.      Breath sounds: Normal breath sounds. No stridor. No wheezing, rhonchi or rales.      Comments: Intubated and sedated.  Abdominal:      General: There is no distension.      Palpations: There is no mass.   Genitourinary:     Comments: Dark yellow colored urine in sun collection bag.  Musculoskeletal:         General: Swelling present. No tenderness, deformity or signs of injury.      Right lower leg: Edema present.      Left lower leg: Edema present.   Skin:     General: Skin is warm.      Capillary Refill: Capillary refill takes 2 to 3 seconds.      Coloration: Skin is pale. Skin is not jaundiced.      Findings: No bruising, erythema, lesion or rash.      Significant Labs:  ABGs:   Recent Labs   Lab 07/17/22 0720   PH 7.534*   PCO2 33.9*   HCO3 28.6*   POCSATURATED 91*   BE 6     BMP:   Recent Labs   Lab 07/17/22 0359   *      K 3.9      CO2 28   BUN 77*   CREATININE 4.1*   CALCIUM 8.5*   MG 2.4     CBC:   Recent Labs   Lab 07/17/22  0359 07/17/22  0404 07/17/22 0720   WBC 25.07*  --   --    RBC 2.85*  --   --    HGB 8.3*  --   --    HCT 25.7*   < > 25*   PLT 82*  --   --    MCV 90  --   --    MCH 29.1  --   --    MCHC 32.3  --   --     < > = values in this interval not displayed.     CMP:   Recent Labs   Lab 07/17/22 0359   *   CALCIUM 8.5*   ALBUMIN 2.1*  2.2*   PROT 5.1*  4.5*      K 3.9   CO2 28      BUN 77*   CREATININE 4.1*   ALKPHOS 94  95   ALT 73*  74*   *  166*   BILITOT 8.5*  8.8*     Coagulation:   Recent Labs   Lab 07/17/22 0359   INR 1.1   APTT 30.4     LFTs:   Recent Labs   Lab 07/17/22 0359   ALT 73*  74*   *  166*   ALKPHOS 94  95   BILITOT 8.5*  8.8*   PROT 5.1*  4.5*   ALBUMIN 2.1*  2.2*     TSH:   Recent Labs   Lab 07/13/22  1649   TSH 0.111*     Significant Imaging:  I have reviewed all imagining in the last 24 hours.    Assessment/Plan:     WAGNER (acute kidney injury)  -Ischemic ATN 2/2 decrease perfusion severe hypotension in a patient with known CKD 3b  Baseline sCr: 1.9-2.3  On admission his Cr was: 2.5  Lab Results   Component Value Date    CREATININE 4.1 (H) 07/17/2022     7/15 urine microscopy showed: many granular casts, muddy brown casts, waxy casts, some WBCS, some yeast, and occasional RBCs    Recommendations:   -Continued on Lasix infusion at 10 mg/hr for now, consider decreasing rate of infusion and if further diuresis warranted consider addition of acetazolamide  -Electrolytes: replace as necessary, renal diet, page nephrology if K >5.5   · For hyperK: N/A   Phos level 5.8, continue phos binders Sevelamer 800mg tid IF not NPO   Mg 2.4, goal >2  -Acid/base:  AGMA 2/2 ATN  -Fluid balance: fluid overloaded   -Anemia: Hgb 8.3, send anemia panel labs, transfuse for Hgb <7.0  -Strict I/O's and daily weights  -Renal function panel and Mg levels Q8H   -Check uric acid, CK level, lactic acid, TSH, and urine labs (ordered)  -check Renal ultrasound   -Maintain MAP >65 for renal perfusion    There is no immediate indication for KRT at this time        Dilated cardiomyopathy  S/p ICD placement and 7/13 pt underwent HM 2 explantation, and implantation of HM 3 LVAD.    Heart replaced by heart assist device  7/13 pt underwent HM 2 explantation, and implantation of HM 3 LVAD  -Plan per primary team       Leukocytosis  Lab Results   Component Value Date    WBC 25.07 (H) 07/17/2022     -Plan per primary team       Chronic combined systolic and diastolic heart failure  -Continued on Lasix infusion per primary team  -Plan per primary team         Thank you for your consult. I will follow-up with patient. Please contact us if you have any additional questions.    Osmar Jackson MD  Nephrology  [facility]

## 2022-07-17 NOTE — SUBJECTIVE & OBJECTIVE
Interval History: Patient seen and examined this morning. No acute events overnight. Weaned off Levophed and continued on vasopressin this morning. Afebrile with pulse ranging from 100-90s bpm. Systolic blood pressures ranging from 80-70s from artrial line. Still intubated on PEEP 7 and FiO2 70% and UOP with ~5.8 liters in the last 24 hours. BUN 77, creatinine 4.1 and phosphorous 5.8.  Continued on Lasix infusion at 10 mg/hr per primary team.    Review of patient's allergies indicates:   Allergen Reactions    Lisinopril Anaphylaxis    Hydralazine analogues      Chronic constipation, impotence, dizziness     Current Facility-Administered Medications   Medication Frequency    0.9%  NaCl infusion PRN    dexmedetomidine (PRECEDEX) 400mcg/100mL 0.9% NaCL infusion Continuous    dextrose 10% bolus 125 mL PRN    dextrose 10% bolus 250 mL PRN    EPINEPHrine (ADRENALIN) 10 mg in dextrose 5 % 250 mL infusion Continuous    furosemide (LASIX) 200 mg in dextrose 5 % 100 mL continuous infusion (conc: 2 mg/mL) Continuous    heparin 25,000 units in dextrose 5% 250 mL (100 units/mL) infusion (heparin infusion - NO NOMOGRAM) Continuous    HYDROmorphone injection 1 mg Q4H PRN    HYDROmorphone injection 2 mg Q4H PRN    insulin regular in 0.9 % NaCl 100 unit/100 mL (1 unit/mL) infusion Continuous    levothyroxine tablet 112 mcg Before breakfast    mupirocin 2 % ointment BID    nitric oxide gas Gas 5 ppm Continuous    NORepinephrine 8 mg in dextrose 5% 250 mL infusion Continuous    pantoprazole injection 40 mg BID    propofol (DIPRIVAN) 10 mg/mL infusion Continuous    vasopressin (PITRESSIN) 1 Units/mL in dextrose 5 % 100 mL infusion Continuous       Objective:     Vital Signs (Most Recent):  Temp: 98.24 °F (36.8 °C) (07/17/22 0545)  Pulse: 100 (07/17/22 0800)  Resp: 14 (07/17/22 0524)  BP: (!) 80/0 (07/17/22 0315)  SpO2: 100 % (07/16/22 1505)  O2 Device (Oxygen Therapy): ventilator (07/17/22 0500)   Vital Signs (24h Range):  Temp:   [98.06 °F (36.7 °C)-98.24 °F (36.8 °C)] 98.24 °F (36.8 °C)  Pulse:  [] 100  Resp:  [14-15] 14  SpO2:  [100 %] 100 %  BP: (78-82)/(0) 80/0  Arterial Line BP: (77-89)/(63-77) 86/74     Weight: 118.8 kg (262 lb) (07/17/22 0533)  Body mass index is 34.57 kg/m².  Body surface area is 2.47 meters squared.    I/O last 3 completed shifts:  In: 3005.5 [I.V.:2215.4; NG/GT:20; IV Piggyback:770.2]  Out: 7737 [Urine:6720; Blood:1; Chest Tube:1016]    Physical Exam  Vitals and nursing note reviewed.   Constitutional:       General: He is in acute distress.      Appearance: He is ill-appearing. He is not toxic-appearing or diaphoretic.   HENT:      Mouth/Throat:      Mouth: Mucous membranes are moist.   Cardiovascular:      Rate and Rhythm: Normal rate and regular rhythm.      Pulses: Normal pulses.      Heart sounds: Murmur heard.      Comments: Intubated   ECHMO catheters.   Trialysis catheter.   Pulmonary:      Effort: Pulmonary effort is normal. No respiratory distress.      Breath sounds: Normal breath sounds. No stridor. No wheezing, rhonchi or rales.      Comments: Intubated and sedated.  Abdominal:      General: There is no distension.      Palpations: There is no mass.   Genitourinary:     Comments: Dark yellow colored urine in sun collection bag.  Musculoskeletal:         General: Swelling present. No tenderness, deformity or signs of injury.      Right lower leg: Edema present.      Left lower leg: Edema present.   Skin:     General: Skin is warm.      Capillary Refill: Capillary refill takes 2 to 3 seconds.      Coloration: Skin is pale. Skin is not jaundiced.      Findings: No bruising, erythema, lesion or rash.     Significant Labs:  ABGs:   Recent Labs   Lab 07/17/22  0720   PH 7.534*   PCO2 33.9*   HCO3 28.6*   POCSATURATED 91*   BE 6     BMP:   Recent Labs   Lab 07/17/22  0359   *      K 3.9      CO2 28   BUN 77*   CREATININE 4.1*   CALCIUM 8.5*   MG 2.4     CBC:   Recent Labs   Lab  07/17/22 0359 07/17/22  0404 07/17/22 0720   WBC 25.07*  --   --    RBC 2.85*  --   --    HGB 8.3*  --   --    HCT 25.7*   < > 25*   PLT 82*  --   --    MCV 90  --   --    MCH 29.1  --   --    MCHC 32.3  --   --     < > = values in this interval not displayed.     CMP:   Recent Labs   Lab 07/17/22 0359   *   CALCIUM 8.5*   ALBUMIN 2.1*  2.2*   PROT 5.1*  4.5*      K 3.9   CO2 28      BUN 77*   CREATININE 4.1*   ALKPHOS 94  95   ALT 73*  74*   *  166*   BILITOT 8.5*  8.8*     Coagulation:   Recent Labs   Lab 07/17/22 0359   INR 1.1   APTT 30.4     LFTs:   Recent Labs   Lab 07/17/22 0359   ALT 73*  74*   *  166*   ALKPHOS 94  95   BILITOT 8.5*  8.8*   PROT 5.1*  4.5*   ALBUMIN 2.1*  2.2*     TSH:   Recent Labs   Lab 07/13/22 1649   TSH 0.111*     Significant Imaging:  I have reviewed all imagining in the last 24 hours.

## 2022-07-17 NOTE — PROGRESS NOTES
Cardiac Surgery Progress Note     MIGDALIA overnight  Weaning pressors  Net negative 4L     Gtts  Epi 0.06, vaso 0.04  Lasix      A/P  2 Days Post-Op s/p chest closure s/p redo VAD     Precedex for sedation  Continue epi at current dose  Continue sun, urine output goal of -2L  Likely wean ECMO tomorrow    Vidhi Nicolas MD, PGY-6  Cardiothoracic Surgery   [phone]

## 2022-07-17 NOTE — PROGRESS NOTES
[facility]  Critical Care - Surgery  Progress Note    Patient Name: Tim Richards  MRN: [id number]  Admission Date: 6/27/2022  Hospital Length of Stay: 20 days  Code Status: Full Code  Attending Provider: Yg Kaufman MD  Primary Care Provider: Deyanira Booth MD   Principal Problem: Left ventricular assist device (LVAD) complication    Subjective:     Hospital/ICU Course:  No notes on file    Interval History/Significant Events: No acute events overnight. Hemodynamically stable on 0.06 epi, 0.04 vaso. Giapreza and levo weaned off yesterday. Lsaix at 10 with UOP of 200-300/hr. Stable flows on VAD and ECMO.    Follow-up For: Procedure(s) (LRB):  CLOSURE, WOUND, STERNUM (N/A)  APPLICATION, WOUND VAC (N/A)  INSERTION, GRAFT, PERICARDIUM (N/A)  IRRIGATION, MEDIASTINUM (N/A)    Post-Operative Day: 2 Days Post-Op    Objective:     Vital Signs (Most Recent):  Temp: 98.24 °F (36.8 °C) (07/17/22 0509)  Pulse: 98 (07/17/22 0509)  Resp: 14 (07/17/22 0524)  BP: (!) 80/0 (07/17/22 0315)  SpO2: 100 % (07/16/22 1505)   Vital Signs (24h Range):  Temp:  [98.06 °F (36.7 °C)-98.24 °F (36.8 °C)] 98.24 °F (36.8 °C)  Pulse:  [] 98  Resp:  [14-15] 14  SpO2:  [100 %] 100 %  BP: (78-82)/(0) 80/0  Arterial Line BP: (80-89)/(67-77) 83/70     Weight: 118.8 kg (262 lb)  Body mass index is 34.57 kg/m².      Intake/Output Summary (Last 24 hours) at 7/17/2022 0543  Last data filed at 7/17/2022 0500  Gross per 24 hour   Intake 1840.74 ml   Output 5823 ml   Net -3982.26 ml       Physical Exam  Constitutional:       Comments: Intubated and sedated   HENT:      Head: Normocephalic and atraumatic.      Mouth/Throat:      Comments: OG in place  Eyes:      Pupils: Pupils are equal, round, and reactive to light.   Neck:      Comments: L IJ CVC  R IJ trialysis  Cardiovascular:      Rate and Rhythm: Regular rhythm. Tachycardia present.      Comments: On V-A ECMO -- 4000 rpm, 3.0L -- clots present in oxygenator  LVAD in  place -- 5500 rpm, 4.0L    Midline sternotomy c/d with dressing in place    2 plerual and 2 mediastinal chest tubes to suction.    V pacing wires in place.   Pulmonary:      Comments: Mechanically ventilated  Abdominal:      General: There is no distension.      Palpations: Abdomen is soft.   Genitourinary:     Comments: Lagunas in place draining clear urine  Musculoskeletal:      Comments: ECMO cannula x2 (right femoral artery, right femoral vein)    L brachial, right radial arterial line   Skin:     General: Skin is warm and dry.   Neurological:      Comments: Following commands when sedation paused       Vents:  Vent Mode: A/C (07/17/22 0509)  Ventilator Initiated: Yes (07/15/22 1027)  Set Rate: 14 BPM (07/17/22 0509)  Vt Set: 400 mL (07/17/22 0509)  PEEP/CPAP: 7 cmH20 (07/17/22 0509)  Oxygen Concentration (%): 50 (07/17/22 0509)  Peak Airway Pressure: 27 cmH2O (07/17/22 0509)  Plateau Pressure: 21 cmH20 (07/17/22 0509)  Total Ve: 4.9 mL (07/17/22 0509)  Negative Inspiratory Force (cm H2O): 0 (07/17/22 0509)  F/VT Ratio<105 (RSBI): (!) 36.08 (07/17/22 0042)    Lines/Drains/Airways       Central Venous Catheter Line  Duration                  ECMO Cannula 07/13/22 right femoral artery 4 days         ECMO Cannula 07/13/22 right femoral vein 4 days     Introducer with Double Lumen 07/13/22 0900 left internal jugular 3 days    Percutaneous Central Line Insertion/Assessment - Quad Lumen  07/13/22 0941 left internal jugular 3 days    Trialysis (Dialysis) Catheter 07/13/22 2100 right internal jugular 3 days              Drain  Duration                  Chest Tube 07/13/22 1916 1 Right Pleural 32 Fr. 3 days         Chest Tube 07/13/22 1916 2 Left Pleural 32 Fr. 3 days         Chest Tube 07/13/22 1916 3 Anterior Mediastinal 32 Fr. 3 days         Chest Tube 07/13/22 1916 4 Posterior Mediastinal 32 Fr. 3 days         Urethral Catheter 07/13/22 0848 Temperature probe;Latex 14 Fr. 3 days         NG/OG Tube 07/15/22 1030 [place]  mouth 1 day              Airway  Duration                  Airway - Non-Surgical 07/13/22 0848 3 days              Arterial Line  Duration             Arterial Line 07/13/22 0932 Left Brachial 3 days    Arterial Line 07/13/22 2000 Right Radial 3 days              Line  Duration                  VAD 03/08/18 1124 Left ventricular assist device HeartMate II 1591 days         VAD 07/13/22 1300 Left ventricular assist device HeartMate 3 3 days              Peripheral Intravenous Line  Duration                  Midline Catheter Insertion/Assessment  - Single Lumen 06/28/22 1027 Right cephalic vein (lateral side of arm) 20g x 8cm 18 days                    Significant Labs:    CBC/Anemia Profile:  Recent Labs   Lab 07/16/22  1551 07/16/22  1607 07/16/22  2105 07/16/22  2358 07/17/22  0359 07/17/22  0404   WBC 26.52*  --  25.17*  --  25.07*  --    HGB 8.2*  --  8.0*  --  8.3*  --    HCT 24.8*   < > 24.7* 31* 25.7* 24*   PLT 73*  --  81*  --  82*  --    MCV 89  --  88  --  90  --    RDW 15.7*  --  15.6*  --  15.8*  --     < > = values in this interval not displayed.        Chemistries:  Recent Labs   Lab 07/16/22  1551 07/16/22  2105 07/17/22  0359    141 143   K 4.0 3.9 3.9    103 104   CO2 27 26 28   BUN 69* 72* 77*   CREATININE 4.2* 4.5* 4.1*   CALCIUM 8.5* 8.8 8.5*   ALBUMIN 2.1* 2.1* 2.1*  2.2*   PROT 4.9* 5.0* 5.1*  4.5*   BILITOT 8.3* 8.9* 8.5*  8.8*   ALKPHOS 90 90 94  95   ALT 84* 82* 73*  74*   * 175* 163*  166*   MG 2.4 2.6 2.4   PHOS 6.7* 6.6* 5.8*       ABGs:   Recent Labs   Lab 07/17/22  0404   PH 7.472*   PCO2 42.7   HCO3 31.3*   POCSATURATED 98   BE 8     Coagulation:   Recent Labs   Lab 07/17/22  0359   INR 1.1   APTT 30.4     Lactic Acid: No results for input(s): LACTATE in the last 48 hours.  All pertinent labs within the past 24 hours have been reviewed.    Significant Imaging:  I have reviewed all pertinent imaging results/findings within the past 24 hours.    Assessment/Plan:      Chronic combined systolic and diastolic heart failure  Tim Richards is a 56 y.o. male HFrEF with an EF of 10% and a history of HM2 LVAD in 2018 who is now s/p HM3 upgrade, initiation of V-A ECMO after failure to wean off bypass x2      Neuro/Psych:   -- Sedation: Precedex.  -- Pain: PRN Dilaudid             Cards:   -- S/P LVAD exchange on 7/14/22  --Improving pressor requirements, consider weaning vaso   Epi 0.06   Levo off   Vaso 0.04   Dopamine off   Giapreza off  -- Stress dose steroids complete  -- Ventricular pacing wires.  -- MAP goal 75  -- VAD and ECMO flows stable, consider trial of minimal ECMO support today  -- Sternal closure on 7/15      Pulm:   -- Goal O2 sat > 90%  -- ABG PRN  -- Mediastinal chest tubes x2 and pleural chest tube x2 to suction  -- Nitric at 5 ppm      Renal:  -- Keep sun for strict I/O  -- Trend BUN/Cr 77/4.1 <--55/4.2 <-- 31/3.5 <-- 20/2.4  -- Lasix drip at 10  -- Goal negative 1-2L      FEN / GI:   -- Replace lytes as needed  -- Nutrition: NPO  -- GI ppx: famotidine      ID:   -- Tm: afebrile; WBC stable  -- ABX complete      Heme/Onc:   -- H/H stable   -- Daily CBC  -- Received 18 pRBCs, 16 FFP, 2 plt, 25 cryo; 1500 albumin intra-op  -- No product requirement since sternal closure  -- Heparin gtt at 600      Endo:   -- BG goal 140-180  -- Insulin gtt      PPx:   Feeding: NPO  Analgesia/Sedation: Precedex / PRN Dilaudid  Thromboembolic prevention: SCDs  HOB >30: Yes  Stress Ulcer ppx: famotidine  Glucose control: Critical care goal 140-180 g/dl, ISS     Lines/Drains/Airway: ETT; OG; ECMO canula x2; RIJ trialysis, LIJ CVC and gertrude, r-radial a-line, Chest tube x4; sun; [place], VAD; midline      Dispo/Code Status/Palliative:   -- SICU / Full Code.              Dang Amezcua MD  Critical Care - Surgery  [facility]

## 2022-07-18 PROBLEM — E44.0 MODERATE PROTEIN-CALORIE MALNUTRITION: Status: ACTIVE | Noted: 2022-07-18

## 2022-07-18 LAB
ALBUMIN SERPL BCP-MCNC: 2 G/DL (ref 3.5–5.2)
ALBUMIN SERPL BCP-MCNC: 2.1 G/DL (ref 3.5–5.2)
ALBUMIN SERPL BCP-MCNC: 2.1 G/DL (ref 3.5–5.2)
ALP SERPL-CCNC: 108 U/L (ref 55–135)
ALP SERPL-CCNC: 113 U/L (ref 55–135)
ALP SERPL-CCNC: 117 U/L (ref 55–135)
ALT SERPL W/O P-5'-P-CCNC: 51 U/L (ref 10–44)
ALT SERPL W/O P-5'-P-CCNC: 53 U/L (ref 10–44)
ALT SERPL W/O P-5'-P-CCNC: 62 U/L (ref 10–44)
ANION GAP SERPL CALC-SCNC: 13 MMOL/L (ref 8–16)
ANION GAP SERPL CALC-SCNC: 9 MMOL/L (ref 8–16)
ANION GAP SERPL CALC-SCNC: 9 MMOL/L (ref 8–16)
APTT BLDCRRT: 28.1 SEC (ref 21–32)
APTT BLDCRRT: 28.8 SEC (ref 21–32)
APTT BLDCRRT: 28.8 SEC (ref 21–32)
AST SERPL-CCNC: 119 U/L (ref 10–40)
AST SERPL-CCNC: 85 U/L (ref 10–40)
AST SERPL-CCNC: 98 U/L (ref 10–40)
BASOPHILS # BLD AUTO: 0.02 K/UL (ref 0–0.2)
BASOPHILS NFR BLD: 0.1 % (ref 0–1.9)
BILIRUB SERPL-MCNC: 10.4 MG/DL (ref 0.1–1)
BILIRUB SERPL-MCNC: 10.8 MG/DL (ref 0.1–1)
BILIRUB SERPL-MCNC: 11.7 MG/DL (ref 0.1–1)
BUN SERPL-MCNC: 69 MG/DL (ref 6–20)
BUN SERPL-MCNC: 74 MG/DL (ref 6–20)
BUN SERPL-MCNC: 75 MG/DL (ref 6–20)
CALCIUM SERPL-MCNC: 9.1 MG/DL (ref 8.7–10.5)
CALCIUM SERPL-MCNC: 9.2 MG/DL (ref 8.7–10.5)
CALCIUM SERPL-MCNC: 9.4 MG/DL (ref 8.7–10.5)
CHLORIDE SERPL-SCNC: 109 MMOL/L (ref 95–110)
CHLORIDE SERPL-SCNC: 113 MMOL/L (ref 95–110)
CHLORIDE SERPL-SCNC: 114 MMOL/L (ref 95–110)
CO2 SERPL-SCNC: 23 MMOL/L (ref 23–29)
CO2 SERPL-SCNC: 24 MMOL/L (ref 23–29)
CO2 SERPL-SCNC: 24 MMOL/L (ref 23–29)
CREAT SERPL-MCNC: 2.7 MG/DL (ref 0.5–1.4)
CREAT SERPL-MCNC: 3 MG/DL (ref 0.5–1.4)
CREAT SERPL-MCNC: 3.3 MG/DL (ref 0.5–1.4)
DIFFERENTIAL METHOD: ABNORMAL
EOSINOPHIL # BLD AUTO: 0 K/UL (ref 0–0.5)
EOSINOPHIL # BLD AUTO: 0 K/UL (ref 0–0.5)
EOSINOPHIL # BLD AUTO: 0.1 K/UL (ref 0–0.5)
EOSINOPHIL NFR BLD: 0.1 % (ref 0–8)
EOSINOPHIL NFR BLD: 0.1 % (ref 0–8)
EOSINOPHIL NFR BLD: 0.3 % (ref 0–8)
ERYTHROCYTE [DISTWIDTH] IN BLOOD BY AUTOMATED COUNT: 15.9 % (ref 11.5–14.5)
ERYTHROCYTE [DISTWIDTH] IN BLOOD BY AUTOMATED COUNT: 16.3 % (ref 11.5–14.5)
ERYTHROCYTE [DISTWIDTH] IN BLOOD BY AUTOMATED COUNT: 16.7 % (ref 11.5–14.5)
EST. GFR  (AFRICAN AMERICAN): 23 ML/MIN/1.73 M^2
EST. GFR  (AFRICAN AMERICAN): 25.8 ML/MIN/1.73 M^2
EST. GFR  (AFRICAN AMERICAN): 29.3 ML/MIN/1.73 M^2
EST. GFR  (NON AFRICAN AMERICAN): 19.9 ML/MIN/1.73 M^2
EST. GFR  (NON AFRICAN AMERICAN): 22.3 ML/MIN/1.73 M^2
EST. GFR  (NON AFRICAN AMERICAN): 25.4 ML/MIN/1.73 M^2
FIBRINOGEN PPP-MCNC: 738 MG/DL (ref 182–400)
FINAL PATHOLOGIC DIAGNOSIS: NORMAL
FIO2: 95
GLUCOSE SERPL-MCNC: 140 MG/DL (ref 70–110)
GLUCOSE SERPL-MCNC: 146 MG/DL (ref 70–110)
GLUCOSE SERPL-MCNC: 149 MG/DL (ref 70–110)
GROSS: NORMAL
HCO3 UR-SCNC: 23 MMOL/L (ref 24–28)
HCO3 UR-SCNC: 24.2 MMOL/L (ref 24–28)
HCO3 UR-SCNC: 24.4 MMOL/L (ref 24–28)
HCO3 UR-SCNC: 24.5 MMOL/L (ref 24–28)
HCO3 UR-SCNC: 24.7 MMOL/L (ref 24–28)
HCO3 UR-SCNC: 24.8 MMOL/L (ref 24–28)
HCO3 UR-SCNC: 24.9 MMOL/L (ref 24–28)
HCO3 UR-SCNC: 24.9 MMOL/L (ref 24–28)
HCO3 UR-SCNC: 25.2 MMOL/L (ref 24–28)
HCO3 UR-SCNC: 25.4 MMOL/L (ref 24–28)
HCO3 UR-SCNC: 25.5 MMOL/L (ref 24–28)
HCO3 UR-SCNC: 25.7 MMOL/L (ref 24–28)
HCT VFR BLD AUTO: 25.3 % (ref 40–54)
HCT VFR BLD AUTO: 25.4 % (ref 40–54)
HCT VFR BLD AUTO: 25.7 % (ref 40–54)
HCT VFR BLD CALC: 23 %PCV (ref 36–54)
HCT VFR BLD CALC: 24 %PCV (ref 36–54)
HCT VFR BLD CALC: 25 %PCV (ref 36–54)
HCT VFR BLD CALC: 26 %PCV (ref 36–54)
HCT VFR BLD CALC: 31 %PCV (ref 36–54)
HGB BLD-MCNC: 7.9 G/DL (ref 14–18)
HGB BLD-MCNC: 8.1 G/DL (ref 14–18)
HGB BLD-MCNC: 8.2 G/DL (ref 14–18)
IMM GRANULOCYTES # BLD AUTO: 0.22 K/UL (ref 0–0.04)
IMM GRANULOCYTES # BLD AUTO: 0.38 K/UL (ref 0–0.04)
IMM GRANULOCYTES # BLD AUTO: 0.41 K/UL (ref 0–0.04)
IMM GRANULOCYTES NFR BLD AUTO: 1.2 % (ref 0–0.5)
IMM GRANULOCYTES NFR BLD AUTO: 2.2 % (ref 0–0.5)
IMM GRANULOCYTES NFR BLD AUTO: 2.6 % (ref 0–0.5)
INR PPP: 1.1 (ref 0.8–1.2)
LDH SERPL L TO P-CCNC: 1.01 MMOL/L (ref 0.36–1.25)
LDH SERPL L TO P-CCNC: 1.09 MMOL/L (ref 0.36–1.25)
LDH SERPL L TO P-CCNC: 1.11 MMOL/L (ref 0.36–1.25)
LDH SERPL L TO P-CCNC: 1.13 MMOL/L (ref 0.36–1.25)
LDH SERPL L TO P-CCNC: 1.17 MMOL/L (ref 0.36–1.25)
LDH SERPL L TO P-CCNC: 1.18 MMOL/L (ref 0.36–1.25)
LDH SERPL L TO P-CCNC: 1.19 MMOL/L (ref 0.36–1.25)
LDH SERPL L TO P-CCNC: 517 U/L (ref 110–260)
LYMPHOCYTES # BLD AUTO: 0.6 K/UL (ref 1–4.8)
LYMPHOCYTES # BLD AUTO: 0.6 K/UL (ref 1–4.8)
LYMPHOCYTES # BLD AUTO: 0.7 K/UL (ref 1–4.8)
LYMPHOCYTES NFR BLD: 3.2 % (ref 18–48)
LYMPHOCYTES NFR BLD: 3.3 % (ref 18–48)
LYMPHOCYTES NFR BLD: 4.1 % (ref 18–48)
Lab: NORMAL
MAGNESIUM SERPL-MCNC: 2.3 MG/DL (ref 1.6–2.6)
MAGNESIUM SERPL-MCNC: 2.4 MG/DL (ref 1.6–2.6)
MAGNESIUM SERPL-MCNC: 2.7 MG/DL (ref 1.6–2.6)
MCH RBC QN AUTO: 28.5 PG (ref 27–31)
MCH RBC QN AUTO: 28.7 PG (ref 27–31)
MCH RBC QN AUTO: 28.9 PG (ref 27–31)
MCHC RBC AUTO-ENTMCNC: 30.7 G/DL (ref 32–36)
MCHC RBC AUTO-ENTMCNC: 32 G/DL (ref 32–36)
MCHC RBC AUTO-ENTMCNC: 32.3 G/DL (ref 32–36)
MCV RBC AUTO: 89 FL (ref 82–98)
MCV RBC AUTO: 89 FL (ref 82–98)
MCV RBC AUTO: 94 FL (ref 82–98)
METHEMOGLOBIN: 0.7 % (ref 0–3)
MONOCYTES # BLD AUTO: 1.6 K/UL (ref 0.3–1)
MONOCYTES # BLD AUTO: 1.6 K/UL (ref 0.3–1)
MONOCYTES # BLD AUTO: 1.7 K/UL (ref 0.3–1)
MONOCYTES NFR BLD: 10.2 % (ref 4–15)
MONOCYTES NFR BLD: 9 % (ref 4–15)
MONOCYTES NFR BLD: 9.7 % (ref 4–15)
NEUTROPHILS # BLD AUTO: 13.2 K/UL (ref 1.8–7.7)
NEUTROPHILS # BLD AUTO: 14.4 K/UL (ref 1.8–7.7)
NEUTROPHILS # BLD AUTO: 15.6 K/UL (ref 1.8–7.7)
NEUTROPHILS NFR BLD: 82.7 % (ref 38–73)
NEUTROPHILS NFR BLD: 84.7 % (ref 38–73)
NEUTROPHILS NFR BLD: 86.3 % (ref 38–73)
NRBC BLD-RTO: 0 /100 WBC
PCO2 BLDA: 33.4 MMHG (ref 35–45)
PCO2 BLDA: 33.5 MMHG (ref 35–45)
PCO2 BLDA: 36 MMHG (ref 35–45)
PCO2 BLDA: 36.2 MMHG (ref 35–45)
PCO2 BLDA: 38.2 MMHG (ref 35–45)
PCO2 BLDA: 38.3 MMHG (ref 35–45)
PCO2 BLDA: 38.5 MMHG (ref 35–45)
PCO2 BLDA: 38.6 MMHG (ref 35–45)
PCO2 BLDA: 39.5 MMHG (ref 35–45)
PCO2 BLDA: 40.9 MMHG (ref 35–45)
PCO2 BLDA: 41.2 MMHG (ref 35–45)
PCO2 BLDA: 41.6 MMHG (ref 35–45)
PH SMN: 7.38 [PH] (ref 7.35–7.45)
PH SMN: 7.38 [PH] (ref 7.35–7.45)
PH SMN: 7.4 [PH] (ref 7.35–7.45)
PH SMN: 7.4 [PH] (ref 7.35–7.45)
PH SMN: 7.41 [PH] (ref 7.35–7.45)
PH SMN: 7.43 [PH] (ref 7.35–7.45)
PH SMN: 7.43 [PH] (ref 7.35–7.45)
PH SMN: 7.44 [PH] (ref 7.35–7.45)
PH SMN: 7.48 [PH] (ref 7.35–7.45)
PH SMN: 7.49 [PH] (ref 7.35–7.45)
PHOSPHATE SERPL-MCNC: 3.8 MG/DL (ref 2.7–4.5)
PHOSPHATE SERPL-MCNC: 4.2 MG/DL (ref 2.7–4.5)
PHOSPHATE SERPL-MCNC: 4.3 MG/DL (ref 2.7–4.5)
PLATELET # BLD AUTO: 81 K/UL (ref 150–450)
PLATELET # BLD AUTO: 83 K/UL (ref 150–450)
PLATELET # BLD AUTO: 95 K/UL (ref 150–450)
PMV BLD AUTO: 12.3 FL (ref 9.2–12.9)
PMV BLD AUTO: 12.8 FL (ref 9.2–12.9)
PMV BLD AUTO: 13.2 FL (ref 9.2–12.9)
PO2 BLDA: 109 MMHG (ref 80–100)
PO2 BLDA: 434 MMHG (ref 40–60)
PO2 BLDA: 44 MMHG (ref 40–60)
PO2 BLDA: 44 MMHG (ref 40–60)
PO2 BLDA: 440 MMHG (ref 80–100)
PO2 BLDA: 68 MMHG (ref 80–100)
PO2 BLDA: 71 MMHG (ref 80–100)
PO2 BLDA: 72 MMHG (ref 80–100)
PO2 BLDA: 77 MMHG (ref 80–100)
PO2 BLDA: 86 MMHG (ref 80–100)
PO2 BLDA: 87 MMHG (ref 80–100)
PO2 BLDA: 93 MMHG (ref 80–100)
POC BE: -2 MMOL/L
POC BE: 0 MMOL/L
POC BE: 1 MMOL/L
POC BE: 2 MMOL/L
POC IONIZED CALCIUM: 1.16 MMOL/L (ref 1.06–1.42)
POC IONIZED CALCIUM: 1.17 MMOL/L (ref 1.06–1.42)
POC IONIZED CALCIUM: 1.2 MMOL/L (ref 1.06–1.42)
POC IONIZED CALCIUM: 1.2 MMOL/L (ref 1.06–1.42)
POC IONIZED CALCIUM: 1.22 MMOL/L (ref 1.06–1.42)
POC IONIZED CALCIUM: 1.22 MMOL/L (ref 1.06–1.42)
POC IONIZED CALCIUM: 1.23 MMOL/L (ref 1.06–1.42)
POC SATURATED O2: 100 % (ref 95–100)
POC SATURATED O2: 100 % (ref 95–100)
POC SATURATED O2: 79 % (ref 95–100)
POC SATURATED O2: 79 % (ref 95–100)
POC SATURATED O2: 93 % (ref 95–100)
POC SATURATED O2: 95 % (ref 95–100)
POC SATURATED O2: 95 % (ref 95–100)
POC SATURATED O2: 96 % (ref 95–100)
POC SATURATED O2: 96 % (ref 95–100)
POC SATURATED O2: 97 % (ref 95–100)
POC SATURATED O2: 97 % (ref 95–100)
POC SATURATED O2: 98 % (ref 95–100)
POC TCO2: 24 MMOL/L (ref 24–29)
POC TCO2: 25 MMOL/L (ref 23–27)
POC TCO2: 25 MMOL/L (ref 23–27)
POC TCO2: 26 MMOL/L (ref 23–27)
POC TCO2: 26 MMOL/L (ref 24–29)
POC TCO2: 26 MMOL/L (ref 24–29)
POC TCO2: 27 MMOL/L (ref 23–27)
POC TCO2: 27 MMOL/L (ref 23–27)
POCT GLUCOSE: 145 MG/DL (ref 70–110)
POTASSIUM BLD-SCNC: 3.8 MMOL/L (ref 3.5–5.1)
POTASSIUM BLD-SCNC: 3.8 MMOL/L (ref 3.5–5.1)
POTASSIUM BLD-SCNC: 3.9 MMOL/L (ref 3.5–5.1)
POTASSIUM BLD-SCNC: 3.9 MMOL/L (ref 3.5–5.1)
POTASSIUM BLD-SCNC: 4 MMOL/L (ref 3.5–5.1)
POTASSIUM BLD-SCNC: 4.1 MMOL/L (ref 3.5–5.1)
POTASSIUM BLD-SCNC: 4.2 MMOL/L (ref 3.5–5.1)
POTASSIUM SERPL-SCNC: 3.8 MMOL/L (ref 3.5–5.1)
POTASSIUM SERPL-SCNC: 3.9 MMOL/L (ref 3.5–5.1)
POTASSIUM SERPL-SCNC: 4.1 MMOL/L (ref 3.5–5.1)
PROT SERPL-MCNC: 5.4 G/DL (ref 6–8.4)
PROT SERPL-MCNC: 5.5 G/DL (ref 6–8.4)
PROT SERPL-MCNC: 5.5 G/DL (ref 6–8.4)
PROTHROMBIN TIME: 11.6 SEC (ref 9–12.5)
PROTHROMBIN TIME: 11.6 SEC (ref 9–12.5)
PROTHROMBIN TIME: 11.7 SEC (ref 9–12.5)
RBC # BLD AUTO: 2.75 M/UL (ref 4.6–6.2)
RBC # BLD AUTO: 2.84 M/UL (ref 4.6–6.2)
RBC # BLD AUTO: 2.84 M/UL (ref 4.6–6.2)
SAMPLE: ABNORMAL
SAMPLE: NORMAL
SODIUM BLD-SCNC: 146 MMOL/L (ref 136–145)
SODIUM BLD-SCNC: 147 MMOL/L (ref 136–145)
SODIUM BLD-SCNC: 148 MMOL/L (ref 136–145)
SODIUM BLD-SCNC: 149 MMOL/L (ref 136–145)
SODIUM BLD-SCNC: 150 MMOL/L (ref 136–145)
SODIUM SERPL-SCNC: 145 MMOL/L (ref 136–145)
SODIUM SERPL-SCNC: 146 MMOL/L (ref 136–145)
SODIUM SERPL-SCNC: 147 MMOL/L (ref 136–145)
WBC # BLD AUTO: 16.01 K/UL (ref 3.9–12.7)
WBC # BLD AUTO: 17.06 K/UL (ref 3.9–12.7)
WBC # BLD AUTO: 18.08 K/UL (ref 3.9–12.7)

## 2022-07-18 PROCEDURE — 36592 COLLECT BLOOD FROM PICC: CPT

## 2022-07-18 PROCEDURE — 85384 FIBRINOGEN ACTIVITY: CPT | Performed by: THORACIC SURGERY (CARDIOTHORACIC VASCULAR SURGERY)

## 2022-07-18 PROCEDURE — 27000221 HC OXYGEN, UP TO 24 HOURS

## 2022-07-18 PROCEDURE — 63600175 PHARM REV CODE 636 W HCPCS: Performed by: STUDENT IN AN ORGANIZED HEALTH CARE EDUCATION/TRAINING PROGRAM

## 2022-07-18 PROCEDURE — 84295 ASSAY OF SERUM SODIUM: CPT

## 2022-07-18 PROCEDURE — 27000685 HC LVAD KIT (30 DAY SUPPLY)

## 2022-07-18 PROCEDURE — 63600367 HC NITRIC OXIDE PER HOUR

## 2022-07-18 PROCEDURE — 20000000 HC ICU ROOM

## 2022-07-18 PROCEDURE — 63600175 PHARM REV CODE 636 W HCPCS

## 2022-07-18 PROCEDURE — 94003 VENT MGMT INPAT SUBQ DAY: CPT

## 2022-07-18 PROCEDURE — 25000003 PHARM REV CODE 250

## 2022-07-18 PROCEDURE — 37799 UNLISTED PX VASCULAR SURGERY: CPT

## 2022-07-18 PROCEDURE — 85025 COMPLETE CBC W/AUTO DIFF WBC: CPT | Performed by: STUDENT IN AN ORGANIZED HEALTH CARE EDUCATION/TRAINING PROGRAM

## 2022-07-18 PROCEDURE — 82803 BLOOD GASES ANY COMBINATION: CPT

## 2022-07-18 PROCEDURE — 99291 PR CRITICAL CARE, E/M 30-74 MINUTES: ICD-10-PCS | Mod: ,,, | Performed by: ANESTHESIOLOGY

## 2022-07-18 PROCEDURE — 85730 THROMBOPLASTIN TIME PARTIAL: CPT | Performed by: STUDENT IN AN ORGANIZED HEALTH CARE EDUCATION/TRAINING PROGRAM

## 2022-07-18 PROCEDURE — 83615 LACTATE (LD) (LDH) ENZYME: CPT | Performed by: STUDENT IN AN ORGANIZED HEALTH CARE EDUCATION/TRAINING PROGRAM

## 2022-07-18 PROCEDURE — 82800 BLOOD PH: CPT

## 2022-07-18 PROCEDURE — 85025 COMPLETE CBC W/AUTO DIFF WBC: CPT | Mod: 91 | Performed by: THORACIC SURGERY (CARDIOTHORACIC VASCULAR SURGERY)

## 2022-07-18 PROCEDURE — 99233 SBSQ HOSP IP/OBS HIGH 50: CPT | Mod: ,,, | Performed by: INTERNAL MEDICINE

## 2022-07-18 PROCEDURE — 83735 ASSAY OF MAGNESIUM: CPT | Performed by: STUDENT IN AN ORGANIZED HEALTH CARE EDUCATION/TRAINING PROGRAM

## 2022-07-18 PROCEDURE — 85610 PROTHROMBIN TIME: CPT | Performed by: STUDENT IN AN ORGANIZED HEALTH CARE EDUCATION/TRAINING PROGRAM

## 2022-07-18 PROCEDURE — 99900035 HC TECH TIME PER 15 MIN (STAT)

## 2022-07-18 PROCEDURE — C9113 INJ PANTOPRAZOLE SODIUM, VIA: HCPCS

## 2022-07-18 PROCEDURE — 85014 HEMATOCRIT: CPT

## 2022-07-18 PROCEDURE — 84100 ASSAY OF PHOSPHORUS: CPT | Performed by: STUDENT IN AN ORGANIZED HEALTH CARE EDUCATION/TRAINING PROGRAM

## 2022-07-18 PROCEDURE — 80053 COMPREHEN METABOLIC PANEL: CPT | Mod: 91 | Performed by: THORACIC SURGERY (CARDIOTHORACIC VASCULAR SURGERY)

## 2022-07-18 PROCEDURE — 27200966 HC CLOSED SUCTION SYSTEM

## 2022-07-18 PROCEDURE — 85730 THROMBOPLASTIN TIME PARTIAL: CPT | Mod: 91 | Performed by: THORACIC SURGERY (CARDIOTHORACIC VASCULAR SURGERY)

## 2022-07-18 PROCEDURE — 80053 COMPREHEN METABOLIC PANEL: CPT | Performed by: STUDENT IN AN ORGANIZED HEALTH CARE EDUCATION/TRAINING PROGRAM

## 2022-07-18 PROCEDURE — 25000003 PHARM REV CODE 250: Performed by: STUDENT IN AN ORGANIZED HEALTH CARE EDUCATION/TRAINING PROGRAM

## 2022-07-18 PROCEDURE — 85610 PROTHROMBIN TIME: CPT | Mod: 91 | Performed by: THORACIC SURGERY (CARDIOTHORACIC VASCULAR SURGERY)

## 2022-07-18 PROCEDURE — 84100 ASSAY OF PHOSPHORUS: CPT | Mod: 91 | Performed by: THORACIC SURGERY (CARDIOTHORACIC VASCULAR SURGERY)

## 2022-07-18 PROCEDURE — 94761 N-INVAS EAR/PLS OXIMETRY MLT: CPT

## 2022-07-18 PROCEDURE — 99291 CRITICAL CARE FIRST HOUR: CPT | Mod: ,,, | Performed by: ANESTHESIOLOGY

## 2022-07-18 PROCEDURE — 99900026 HC AIRWAY MAINTENANCE (STAT)

## 2022-07-18 PROCEDURE — 27000248 HC VAD-ADDITIONAL DAY

## 2022-07-18 PROCEDURE — 82330 ASSAY OF CALCIUM: CPT

## 2022-07-18 PROCEDURE — 83605 ASSAY OF LACTIC ACID: CPT

## 2022-07-18 PROCEDURE — 33949 ECMO/ECLS DAILY MGMT ARTERY: CPT

## 2022-07-18 PROCEDURE — 83735 ASSAY OF MAGNESIUM: CPT | Mod: 91 | Performed by: THORACIC SURGERY (CARDIOTHORACIC VASCULAR SURGERY)

## 2022-07-18 PROCEDURE — 99233 PR SUBSEQUENT HOSPITAL CARE,LEVL III: ICD-10-PCS | Mod: ,,, | Performed by: INTERNAL MEDICINE

## 2022-07-18 PROCEDURE — 84132 ASSAY OF SERUM POTASSIUM: CPT

## 2022-07-18 RX ORDER — POTASSIUM CHLORIDE 29.8 MG/ML
40 INJECTION INTRAVENOUS ONCE
Status: COMPLETED | OUTPATIENT
Start: 2022-07-18 | End: 2022-07-18

## 2022-07-18 RX ORDER — DOCUSATE SODIUM 50 MG/5ML
100 LIQUID ORAL DAILY
Status: DISCONTINUED | OUTPATIENT
Start: 2022-07-18 | End: 2022-07-19

## 2022-07-18 RX ORDER — HEPARIN SODIUM 10000 [USP'U]/100ML
800 INJECTION, SOLUTION INTRAVENOUS CONTINUOUS
Status: DISCONTINUED | OUTPATIENT
Start: 2022-07-18 | End: 2022-07-20

## 2022-07-18 RX ORDER — POLYETHYLENE GLYCOL 3350 17 G/17G
17 POWDER, FOR SOLUTION ORAL DAILY
Status: DISCONTINUED | OUTPATIENT
Start: 2022-07-18 | End: 2022-07-19

## 2022-07-18 RX ORDER — SYRING-NEEDL,DISP,INSUL,0.3 ML 29 G X1/2"
148 SYRINGE, EMPTY DISPOSABLE MISCELLANEOUS ONCE
Status: COMPLETED | OUTPATIENT
Start: 2022-07-18 | End: 2022-07-18

## 2022-07-18 RX ORDER — POTASSIUM CHLORIDE 14.9 MG/ML
20 INJECTION INTRAVENOUS ONCE
Status: COMPLETED | OUTPATIENT
Start: 2022-07-18 | End: 2022-07-18

## 2022-07-18 RX ADMIN — DEXMEDETOMIDINE HYDROCHLORIDE 1.2 MCG/KG/HR: 4 INJECTION INTRAVENOUS at 12:07

## 2022-07-18 RX ADMIN — EPINEPHRINE 0.06 MCG/KG/MIN: 1 INJECTION INTRAMUSCULAR; INTRAVENOUS; SUBCUTANEOUS at 04:07

## 2022-07-18 RX ADMIN — HYDROMORPHONE HYDROCHLORIDE 1 MG: 1 INJECTION, SOLUTION INTRAMUSCULAR; INTRAVENOUS; SUBCUTANEOUS at 09:07

## 2022-07-18 RX ADMIN — HYDROMORPHONE HYDROCHLORIDE 1 MG: 1 INJECTION, SOLUTION INTRAMUSCULAR; INTRAVENOUS; SUBCUTANEOUS at 01:07

## 2022-07-18 RX ADMIN — POTASSIUM CHLORIDE 20 MEQ: 14.9 INJECTION, SOLUTION INTRAVENOUS at 09:07

## 2022-07-18 RX ADMIN — DEXMEDETOMIDINE HYDROCHLORIDE 1.1 MCG/KG/HR: 4 INJECTION INTRAVENOUS at 12:07

## 2022-07-18 RX ADMIN — POTASSIUM CHLORIDE 40 MEQ: 29.8 INJECTION, SOLUTION INTRAVENOUS at 04:07

## 2022-07-18 RX ADMIN — PANTOPRAZOLE SODIUM 40 MG: 40 INJECTION, POWDER, FOR SOLUTION INTRAVENOUS at 09:07

## 2022-07-18 RX ADMIN — HYDROMORPHONE HYDROCHLORIDE 1 MG: 1 INJECTION, SOLUTION INTRAMUSCULAR; INTRAVENOUS; SUBCUTANEOUS at 04:07

## 2022-07-18 RX ADMIN — HYDROMORPHONE HYDROCHLORIDE 1 MG: 1 INJECTION, SOLUTION INTRAMUSCULAR; INTRAVENOUS; SUBCUTANEOUS at 08:07

## 2022-07-18 RX ADMIN — HYDROMORPHONE HYDROCHLORIDE 0.5 MG: 1 INJECTION, SOLUTION INTRAMUSCULAR; INTRAVENOUS; SUBCUTANEOUS at 05:07

## 2022-07-18 RX ADMIN — MAGNESIUM CITRATE 148 ML: 1.75 LIQUID ORAL at 10:07

## 2022-07-18 RX ADMIN — HYDROMORPHONE HYDROCHLORIDE 1 MG: 1 INJECTION, SOLUTION INTRAMUSCULAR; INTRAVENOUS; SUBCUTANEOUS at 12:07

## 2022-07-18 RX ADMIN — DEXMEDETOMIDINE HYDROCHLORIDE 1.4 MCG/KG/HR: 4 INJECTION INTRAVENOUS at 02:07

## 2022-07-18 RX ADMIN — LEVOTHYROXINE SODIUM 112 MCG: 112 TABLET ORAL at 05:07

## 2022-07-18 RX ADMIN — DEXMEDETOMIDINE HYDROCHLORIDE 0.03 MCG/KG/HR: 4 INJECTION INTRAVENOUS at 08:07

## 2022-07-18 RX ADMIN — DEXMEDETOMIDINE HYDROCHLORIDE 1.2 MCG/KG/HR: 4 INJECTION INTRAVENOUS at 10:07

## 2022-07-18 RX ADMIN — DEXMEDETOMIDINE HYDROCHLORIDE 1.4 MCG/KG/HR: 4 INJECTION INTRAVENOUS at 07:07

## 2022-07-18 RX ADMIN — MUPIROCIN: 20 OINTMENT TOPICAL at 10:07

## 2022-07-18 RX ADMIN — PANTOPRAZOLE SODIUM 40 MG: 40 INJECTION, POWDER, FOR SOLUTION INTRAVENOUS at 10:07

## 2022-07-18 RX ADMIN — DEXMEDETOMIDINE HYDROCHLORIDE 1.4 MCG/KG/HR: 4 INJECTION INTRAVENOUS at 05:07

## 2022-07-18 NOTE — ASSESSMENT & PLAN NOTE
Pt is net ->9 L in the past 48 hours  -would hold diuretics today, discussed with primary team and they will manage diuretics   -Plan per primary team

## 2022-07-18 NOTE — PROGRESS NOTES
..[facility] shift report    Date: 07/18/2022  ECMO Specialist:  Pippa Horan    Pump parameters:  RPM: 4000  Flow:  3.48-3.5  Sweep:  4L   FiO2:  100%    Oxygenator status:  Clots: pre/post oxy, connectors  Fibrin: pre/post oxy    Pressure trends:  P1: 268-270  P2: 246-248  Delta P: 20-22  Negative:      Volume status:  Chugging noted none  CVP: 4-8  MAP:  76-82  MD notified (name):Shae    Anticoagulation:  aPTT parameters: 40-80  aPTT trends this shift: 29/    Cannula size / status / placement:  Return cannula / from circuit to patient:  18FR RFA 9.5cm [employer]  Drainage cannula / from patient to circuit: 27Fr LFV 5.5cm [employer]     Additional Comments:  Pt remained stable.  No changes.  Continue to monitor clots/ fibrin.

## 2022-07-18 NOTE — SUBJECTIVE & OBJECTIVE
Interval History:   Pt remains intubated, on ECMO, heparin gtt, and on pressors Epi & levophed  Lasix gtt restarted at midnight, however pt did receive acetazolamide 500mg x1 yesterday,   500ml free water given 7/17, NG tube has been removed   sNa  147-->145 this morning  sCr 4.1-->3.3  T. [place] 8.5-->10.4  Urine labs: Shelly 119 and uOsm 365    I/O: 2.2L/6.7L urine and 300ml from chest tube drains, net -4.9L in the last 24 hours and >9L in the last 48 hours    Review of patient's allergies indicates:   Allergen Reactions    Lisinopril Anaphylaxis    Hydralazine analogues      Chronic constipation, impotence, dizziness     Current Facility-Administered Medications   Medication Frequency    0.9%  NaCl infusion PRN    dexmedetomidine (PRECEDEX) 400mcg/100mL 0.9% NaCL infusion Continuous    dextrose 10% bolus 125 mL PRN    dextrose 10% bolus 250 mL PRN    EPINEPHrine (ADRENALIN) 10 mg in dextrose 5 % 250 mL infusion Continuous    furosemide (LASIX) 200 mg in dextrose 5 % 100 mL continuous infusion (conc: 2 mg/mL) Continuous    heparin 25,000 units in dextrose 5% 250 mL (100 units/mL) infusion (heparin infusion - NO NOMOGRAM) Continuous    HYDROmorphone injection 0.5 mg Q4H PRN    HYDROmorphone injection 1 mg Q4H PRN    insulin regular in 0.9 % NaCl 100 unit/100 mL (1 unit/mL) infusion Continuous    levothyroxine tablet 112 mcg Before breakfast    mupirocin 2 % ointment BID    nitric oxide gas Gas 5 ppm Continuous    NORepinephrine 8 mg in dextrose 5% 250 mL infusion Continuous    pantoprazole injection 40 mg BID    propofol (DIPRIVAN) 10 mg/mL infusion Continuous    vasopressin (PITRESSIN) 1 Units/mL in dextrose 5 % 100 mL infusion Continuous       Objective:     Vital Signs (Most Recent):  Temp: 98.06 °F (36.7 °C) (07/18/22 0000)  Pulse: 101 (07/18/22 0000)  Resp: (!) 23 (07/18/22 0012)  BP: (!) 82/0 (07/17/22 2315)  SpO2: 96 % (07/17/22 1915)  O2 Device (Oxygen Therapy): ventilator (07/17/22 7353)   Vital Signs (24h  Range):  Temp:  [97.88 °F (36.6 °C)-98.24 °F (36.8 °C)] 98.06 °F (36.7 °C)  Pulse:  [] 101  Resp:  [14-24] 23  SpO2:  [96 %-100 %] 96 %  BP: (78-82)/(0) 82/0  Arterial Line BP: (75-89)/(61-74) 82/69     Weight: 118.8 kg (262 lb) (07/17/22 0533)  Body mass index is 34.57 kg/m².  Body surface area is 2.47 meters squared.    I/O last 3 completed shifts:  In: 3224.8 [I.V.:2331.4; NG/GT:520; IV Piggyback:373.4]  Out: 52576 [Urine:9562; Chest Tube:769]    Physical Exam  Vitals and nursing note reviewed.   Constitutional:       General: He is in acute distress.      Appearance: He is ill-appearing. He is not toxic-appearing or diaphoretic.   HENT:      Mouth/Throat:      Mouth: Mucous membranes are moist.   Eyes:      General: No scleral icterus.  Cardiovascular:      Rate and Rhythm: Normal rate and regular rhythm.      Pulses: Normal pulses.      Heart sounds: Murmur heard.      Comments: Intubated   Ecmo lines   + R IJ Trialysis catheter   Pulmonary:      Effort: Pulmonary effort is normal. No respiratory distress.      Breath sounds: Normal breath sounds. No stridor. No wheezing, rhonchi or rales.      Comments: intubated  Abdominal:      General: There is no distension.      Palpations: There is no mass.   Genitourinary:     Comments: Dark yellow colored urine in sun collection bag   Musculoskeletal:         General: Swelling present. No tenderness, deformity or signs of injury.      Right lower leg: Edema present.      Left lower leg: Edema present.   Skin:     General: Skin is warm.      Capillary Refill: Capillary refill takes 2 to 3 seconds.      Coloration: Skin is pale. Skin is not jaundiced.      Findings: No bruising, erythema, lesion or rash.       Significant Labs:  ABGs:   Recent Labs   Lab 07/18/22  0000   PH 7.489*   PCO2 33.5*   HCO3 25.5   POCSATURATED 95   BE 2     CBC:   Recent Labs   Lab 07/17/22  2135 07/18/22  0000   WBC 19.14*  --    RBC 2.86*  --    HGB 8.3*  --    HCT 26.0* 25*   PLT 84*   --    MCV 91  --    MCH 29.0  --    MCHC 31.9*  --      CMP:   Recent Labs   Lab 07/17/22 2135   *   CALCIUM 9.0   ALBUMIN 2.1*   PROT 5.3*      K 4.3   CO2 24      BUN 78*   CREATININE 3.5*   ALKPHOS 106   ALT 65*   *   BILITOT 10.4*     LFTs:   Recent Labs   Lab 07/17/22 2135   ALT 65*   *   ALKPHOS 106   BILITOT 10.4*   PROT 5.3*   ALBUMIN 2.1*     All labs within the past 24 hours have been reviewed.     Significant Imaging:  Labs: Reviewed  X-Ray: Reviewed

## 2022-07-18 NOTE — PROGRESS NOTES
07/18/2022  Casper Harris    Current provider:  Yg Kaufman MD    Device interrogation:  TXP LVAD INTERROGATIONS 7/18/2022 7/18/2022 7/18/2022 7/18/2022 7/18/2022 7/18/2022 7/18/2022   Type HeartMate3 - - - - - -   Flow 4.4 4.4 4.2 4.1 4.1 4.1 4.2   Speed 5500 5500 5500 5500 5500 5500 5500   PI 2.8 3.0 3.1 3.1 3.1 3.1 2.9   Power ([place]) 3.9 3.9 3.9 3.9 3.9 3.9 3.9   LSL 5100 - - - - - -   Pulsatility - - - - - - -          Rounded on Tim Richards to ensure all mechanical assist device settings (IABP or VAD) were appropriate and all parameters were within limits.  I was able to ensure all back up equipment was present, the staff had no issues, and the Perfusion Department daily rounding was complete.      For implantable VADs: Interrogation of Ventricular assist device was performed with analysis of device parameters and review of device function. I have personally reviewed the interrogation findings and agree with findings as stated.     In emergency, the nursing units have been notified to contact the perfusion department either by:  Calling [id number] from 630am to 4pm Mon thru Fri, utilizing the On-Call Finder functionality of Epic and searching for Perfusion, or by contacting the hospital  from 4pm to 630am and on weekends and asking to speak with the perfusionist on call.    11:14 AM

## 2022-07-18 NOTE — PROGRESS NOTES
[facility]  Critical Care - Surgery  Progress Note    Patient Name: Tim Richards  MRN: [id number]  Admission Date: 6/27/2022  Hospital Length of Stay: 21 days  Code Status: Full Code  Attending Provider: Yg Kaufman MD  Primary Care Provider: Deyanira Booth MD   Principal Problem: Left ventricular assist device (LVAD) complication    Subjective:     Hospital/ICU Course:  No notes on file    Interval History/Significant Events: No acute events overnight. Hemodynamically stable on epi 0.06 and vaso 0.04. Briefly had to be on levo yesterday but was weaned off. PaO2 has been persistently lower since yesterday morning despite increasing FiO2.    Follow-up For: Procedure(s) (LRB):  CLOSURE, WOUND, STERNUM (N/A)  APPLICATION, WOUND VAC (N/A)  INSERTION, GRAFT, PERICARDIUM (N/A)  IRRIGATION, MEDIASTINUM (N/A)    Post-Operative Day: 3 Days Post-Op    Objective:     Vital Signs (Most Recent):  Temp: 98.06 °F (36.7 °C) (07/18/22 0600)  Pulse: 101 (07/18/22 0600)  Resp: (!) 23 (07/18/22 0423)  BP: (!) 80/0 (07/18/22 0415)  SpO2: 96 % (From ABG) (07/18/22 0415)   Vital Signs (24h Range):  Temp:  [97.34 °F (36.3 °C)-98.06 °F (36.7 °C)] 98.06 °F (36.7 °C)  Pulse:  [] 101  Resp:  [14-24] 23  SpO2:  [96 %-100 %] 96 %  BP: (78-82)/(0) 80/0  Arterial Line BP: (75-89)/(61-75) 85/72     Weight: 118.8 kg (262 lb)  Body mass index is 34.57 kg/m².      Intake/Output Summary (Last 24 hours) at 7/18/2022 0611  Last data filed at 7/18/2022 0600  Gross per 24 hour   Intake 2359.86 ml   Output 7482 ml   Net -5122.14 ml       Physical Exam  Constitutional:       Comments: Intubated and sedated   HENT:      Head: Normocephalic and atraumatic.      Mouth/Throat:      Comments: OG in place  Eyes:      Pupils: Pupils are equal, round, and reactive to light.   Neck:      Comments: L IJ CVC  R IJ trialysis  Cardiovascular:      Rate and Rhythm: Regular rhythm. Tachycardia present.      Comments: On V-A ECMO --  4000 rpm, 3.5L, sweep 4, FiO2 100% -- clots present on both sides of oxygenator  LVAD in place -- 5500 rpm, 4.0L    Midline sternotomy c/d with dressing in place    2 plerual and 2 mediastinal chest tubes to suction.    V pacing wires in place.   Pulmonary:      Comments: Mechanically ventilated  Abdominal:      General: There is no distension.      Palpations: Abdomen is soft.   Genitourinary:     Comments: Lagunas in place draining clear urine  Musculoskeletal:      Comments: ECMO cannula x2 (right femoral artery, right femoral vein)    L brachial, right radial arterial line   Skin:     General: Skin is warm and dry.   Neurological:      Comments: Following commands when sedation paused       Vents:  Vent Mode: A/C (07/18/22 0513)  Ventilator Initiated: Yes (07/15/22 1027)  Set Rate: 14 BPM (07/18/22 0513)  Vt Set: 400 mL (07/18/22 0513)  PEEP/CPAP: 7 cmH20 (07/18/22 0513)  Oxygen Concentration (%): 70 (07/18/22 0600)  Peak Airway Pressure: 31 cmH2O (07/18/22 0513)  Plateau Pressure: 21 cmH20 (07/18/22 0513)  Total Ve: 8.48 mL (07/18/22 0513)  Negative Inspiratory Force (cm H2O): 0 (07/18/22 0513)  F/VT Ratio<105 (RSBI): (!) 54.59 (07/17/22 1530)    Lines/Drains/Airways       Central Venous Catheter Line  Duration                  ECMO Cannula 07/13/22 right femoral artery 5 days         ECMO Cannula 07/13/22 right femoral vein 5 days     Introducer with Double Lumen 07/13/22 0900 left internal jugular 4 days    Percutaneous Central Line Insertion/Assessment - Quad Lumen  07/13/22 0941 left internal jugular 4 days    Trialysis (Dialysis) Catheter 07/13/22 2100 right internal jugular 4 days              Drain  Duration                  Chest Tube 07/13/22 1916 1 Right Pleural 32 Fr. 4 days         Chest Tube 07/13/22 1916 2 Left Pleural 32 Fr. 4 days         Chest Tube 07/13/22 1916 3 Anterior Mediastinal 32 Fr. 4 days         Chest Tube 07/13/22 1916 4 Posterior Mediastinal 32 Fr. 4 days         Urethral Catheter  07/13/22 0848 Temperature probe;Latex 14 Fr. 4 days         NG/OG Tube 07/15/22 1030 Center mouth 2 days              Airway  Duration                  Airway - Non-Surgical 07/13/22 0848 4 days              Arterial Line  Duration             Arterial Line 07/13/22 0932 Left Brachial 4 days    Arterial Line 07/13/22 2000 Right Radial 4 days              Line  Duration                  VAD 03/08/18 1124 Left ventricular assist device HeartMate II 1592 days         VAD 07/13/22 1300 Left ventricular assist device HeartMate 3 4 days              Peripheral Intravenous Line  Duration                  Midline Catheter Insertion/Assessment  - Single Lumen 06/28/22 1027 Right cephalic vein (lateral side of arm) 20g x 8cm 19 days                    Significant Labs:    CBC/Anemia Profile:  Recent Labs   Lab 07/17/22  1518 07/17/22  1710 07/17/22 2135 07/18/22  0000 07/18/22  0318 07/18/22  0401   WBC 19.18*  --  19.14*  --  18.08*  --    HGB 8.7*  --  8.3*  --  8.2*  --    HCT 26.1*   < > 26.0* 25* 25.4* 25*   PLT 84*  --  84*  --  83*  --    MCV 86  --  91  --  89  --    RDW 15.6*  --  15.9*  --  15.9*  --     < > = values in this interval not displayed.        Chemistries:  Recent Labs   Lab 07/17/22  1518 07/17/22 2135 07/18/22  0318    144 145   K 3.9 4.3 3.9    107 109   CO2 26 24 23   BUN 77* 78* 75*   CREATININE 3.7* 3.5* 3.3*   CALCIUM 8.9 9.0 9.1   ALBUMIN 2.1* 2.1* 2.1*   PROT 5.3* 5.3* 5.4*   BILITOT 9.7* 10.4* 10.8*   ALKPHOS 104 106 108   ALT 68* 65* 62*   * 131* 119*   MG 2.3 2.3 2.3   PHOS 4.3 4.1 4.3       ABGs:   Recent Labs   Lab 07/18/22  0401   PH 7.480*   PCO2 33.4*   HCO3 24.9   POCSATURATED 96   BE 1     Coagulation:   Recent Labs   Lab 07/18/22  0318   INR 1.1   APTT 28.1     Lactic Acid: No results for input(s): LACTATE in the last 48 hours.  All pertinent labs within the past 24 hours have been reviewed.    Significant Imaging:  I have reviewed all pertinent imaging  results/findings within the past 24 hours.    Assessment/Plan:     Chronic combined systolic and diastolic heart failure  Tim Richards is a 56 y.o. male HFrEF with an EF of 10% and a history of HM2 LVAD in 2018 who is now s/p HM3 upgrade, initiation of V-A ECMO after failure to wean off bypass x2      Neuro/Psych:   -- Sedation: Precedex.  -- Pain: PRN Dilaudid             Cards:   -- S/P LVAD exchange on 7/14/22  --Improving pressor requirements, consider weaning vaso   Epi 0.06   Levo off   Vaso 0.04   Dopamine off   Giapreza off  -- Stress dose steroids complete  -- Ventricular pacing wires.  -- MAP goal 75  -- VAD and ECMO flows stable, consider trial of minimal ECMO support today  -- Sternal closure on 7/15      Pulm:   -- Goal O2 sat > 90%  -- ABG PRN  -- Mediastinal chest tubes x2 and pleural chest tube x2 to suction  -- Nitric at 5 ppm      Renal:  -- Keep sun for strict I/O  -- Trend BUN/Cr 75/3.3 <-- 77/4.1 <--55/4.2 <-- 31/3.5 <-- 20/2.4  -- Lasix drip at 1        FEN / GI:   -- Replace lytes as needed  -- Nutrition: NPO  -- GI ppx: famotidine      ID:   -- Tm: afebrile; WBC stable  -- ABX complete      Heme/Onc:   -- H/H stable   -- Daily CBC  -- Received 18 pRBCs, 16 FFP, 2 plt, 25 cryo; 1500 albumin intra-op  -- No product requirement since sternal closure  -- Heparin gtt at 600      Endo:   -- BG goal 140-180  -- Insulin gtt      PPx:   Feeding: NPO  Analgesia/Sedation: Precedex / PRN Dilaudid  Thromboembolic prevention: SCDs, heparin gtt  HOB >30: Yes  Stress Ulcer ppx: famotidine  Glucose control: Critical care goal 140-180 g/dl, ISS     Lines/Drains/Airway: ETT; OG; ECMO canula x2; RIJ trialysis, LIJ CVC and gertrude, r-radial a-line, Chest tube x4; sun; [place], VAD; midline      Dispo/Code Status/Palliative:   -- SICU / Full Code.              Dang Amezcua MD  Critical Care - Surgery  [facility]

## 2022-07-18 NOTE — PLAN OF CARE
"SICU PLAN OF CARE NOTE    Dx: Left ventricular assist device (LVAD) complication    Goals of Care:  MAP >75, with epi and levo      Vital Signs:  BP (!) 78/0 (BP Location: Left arm, Patient Position: Lying)   Pulse 101   Temp 97.88 °F (36.6 °C)   Resp (!) 22   Ht 6' 0.99" (1.854 m)   Wt 118.8 kg (262 lb)   SpO2 100%   BMI 34.57 kg/m²     Cardiac:  NSR    Resp:  SpO2 98% on fio2 70% on ventilator     Neuro:  Sedated, Arouses to Voice, Follows Commands, and Moves All Extremities    Gtts: epi, levo, precedex, heparin, lasix    Urine Output:  Urinary Catheter 3970 cc/shift    Drains:      CRRT 5500 RPM. And VA ecmo on 4000 RPM    Diet:  NPO     Labs/Accuchecks:  cbc, cmp, aptt, inr, mag. Phos every 6 hours, abg every 4 hours    Skin:  All skin remains free from injury.  Patient turned Q2, waffle mattress inflated, ICU bed working correctly.    Shift Events:  Patient on epi and levo maintaining MAPs greater than 75. Patient on precedex following commands. Patient with wife at bedside and updated on plan of care for the day. Dr. Kaufman and Dr. Butts updated on patient's assessments, vital signs, cvp, abg, lab results, and urine output throughout the day.  See flowsheet for further assessment/details.  Family updated on current condition/plan of care, questions answered, and emotional support provided.   MD updated on current condition, vitals, labs, and gtts.  No new orders received, will continue to monitor.    "

## 2022-07-18 NOTE — ASSESSMENT & PLAN NOTE
S/p ICD placement and 7/13 pt underwent HM 2 explantation, and implantation of HM 3 LVAD.  -Plan per primary team

## 2022-07-18 NOTE — ASSESSMENT & PLAN NOTE
Tim Richards is a 56 y.o. male HFrEF with an EF of 10% and a history of HM2 LVAD in 2018 who is now s/p HM3 upgrade, initiation of V-A ECMO after failure to wean off bypass x2      Neuro/Psych:   -- Sedation: Precedex.  -- Pain: PRN Dilaudid             Cards:   -- S/P LVAD exchange on 7/14/22  --Improving pressor requirements, consider weaning vaso   Epi 0.06   Levo off   Vaso 0.04   Dopamine off   Giapreza off  -- Stress dose steroids complete  -- Ventricular pacing wires.  -- MAP goal 75  -- VAD and ECMO flows stable, consider trial of minimal ECMO support today  -- Sternal closure on 7/15      Pulm:   -- Goal O2 sat > 90%  -- ABG PRN  -- Mediastinal chest tubes x2 and pleural chest tube x2 to suction  -- Nitric at 5 ppm      Renal:  -- Keep sun for strict I/O  -- Trend BUN/Cr 75/3.3 <-- 77/4.1 <--55/4.2 <-- 31/3.5 <-- 20/2.4  -- Lasix drip at 1        FEN / GI:   -- Replace lytes as needed  -- Nutrition: NPO  -- GI ppx: famotidine      ID:   -- Tm: afebrile; WBC stable  -- ABX complete      Heme/Onc:   -- H/H stable   -- Daily CBC  -- Received 18 pRBCs, 16 FFP, 2 plt, 25 cryo; 1500 albumin intra-op  -- No product requirement since sternal closure  -- Heparin gtt at 600      Endo:   -- BG goal 140-180  -- Insulin gtt      PPx:   Feeding: NPO  Analgesia/Sedation: Precedex / PRN Dilaudid  Thromboembolic prevention: SCDs, heparin gtt  HOB >30: Yes  Stress Ulcer ppx: famotidine  Glucose control: Critical care goal 140-180 g/dl, ISS     Lines/Drains/Airway: ETT; OG; ECMO canula x2; RIJ trialysis, LIJ CVC and gertrude, r-radial a-line, Chest tube x4; sun; WV, VAD; midline      Dispo/Code Status/Palliative:   -- SICU / Full Code.

## 2022-07-18 NOTE — CONSULTS
[facility]  Adult Nutrition  Consult Note    SUMMARY     Recommendations    1) Advance diet as medically appropriate to cardiac diet - fluid per physician.     2) Continue tube feeds of Novasource Renal. Rec'd advance to goal rate of 45 ml/hr as medically feasible to provide 2160 kcal, 98g protein, 774mL water - meets 100% of kcal/protein needs.     3) RD to monitor and f/u.    Goals: Continue to meet % EEN/EPN by RD f/u date  Nutrition Goal Status: new  Communication of RD Recs: POC    Assessment and Plan  Nutrition Problem:  Moderate Protein-Calorie Malnutrition  Malnutrition in the context ofAcute Illness/Injury     Related to (etiology):  Inadequate energy intake, NPO, intubation     Signs and Symptoms (as evidenced by):  Energy Intake: <50% of estimated energy requirement x 5 days  Weight Loss: suspect weight loss masked by edema   Fluid Accumulation: moderate (3+ edema)    Interventions(treatment strategy):  Collaboration of nutrition care with other providers  Enteral nutrition     Nutrition Diagnosis Status:  New    Reason for Assessment    Reason For Assessment: RD follow-up, consult (TF recs)  Diagnosis:  (LVAD complication, WAGNER)  Relevant Medical History: CHF, gout, cardiomyopathy  Interdisciplinary Rounds: did not attend    General Information Comments: Pt seen 2/2 consult for tube feed recommendations. Pt remains intubated/sedated. Spoke with pt's nurse - trickle feeds started today, no s/s intolerance. Pt weighing 245# on admit, wt flux during LOS 2/2 shifts in fluid. 3+ edema noted at this time. Per chart review, wt fluctuating between 253-265# x 4 months prior to admission. Possible ~8# wt loss prior to admission, and suspect further weight loss masked by edema as pt has been NPO x 5 days. Pt meets criteria for moderate malnutrition in the context of acute illness/injury - see PES statement for details.     Nutrition Discharge Planning: cardiac/low sodium  "diet    Nutrition Risk Screen    Nutrition Risk Screen: no indicators present    Nutrition/Diet History    Spiritual, Cultural Beliefs, Congregational Practices, Values that Affect Care: no  Food Allergies: NKFA    Anthropometrics    Temp: 98.06 °F (36.7 °C)  Height Method: Stated  Height: 6' 0.99" (185.4 cm)  Height (inches): 72.99 in  Weight Method: Bed Scale  Weight: 111.6 kg (246 lb)  Weight (lb): 246 lb  Ideal Body Weight (IBW), Male: 183.94 lb  % Ideal Body Weight, Male (lb): 122.28 %  BMI (Calculated): 32.5  BMI Grade: 25 - 29.9 - overweight    Lab/Procedures/Meds    Pertinent Labs Reviewed: reviewed  Pertinent Labs Comments: BUN 75, Crea 3.3, GFR 23, Glu 146, Alb 2.1  Pertinent Medications Reviewed: reviewed  Pertinent Medications Comments: KCl, pantoprazole, polyethylene glycol, epinephrine, furosemide, norepinephrine, vasopressin    Estimated/Assessed Needs    Weight Used For Calorie Calculations: 111.6 kg (246 lb 0.5 oz)  Energy Calorie Requirements (kcal): 1864 kcal/day  Energy Need Method: [place] (modified)  Protein Requirements: 100-126 g pro/day (1.2-1.5 g/kg IBW (decreased needs 2/2 WAGNER))  Weight Used For Protein Calculations: 83.6 kg (184 lb 4.9 oz) (IBW used 2/2 obesity)  Fluid Requirements (mL): 1 ml/kcal or fluid per MD  RDA Method (mL): 1864    Nutrition Prescription Ordered    Current Diet Order: NPO  Current Nutrition Support Formula Ordered: [facility]  Current Nutrition Support Rate Ordered: 10 (ml)  Current Nutrition Support Frequency Ordered: ml/hr x 24hr    Evaluation of Received Nutrient/Fluid Intake    I/O: +5.7L since admit  Comments: LBM: 7/9  Tolerance: tolerating   % Intake of Estimated Energy Needs: 0 - 25 %  % Meal Intake: NPO    Nutrition Risk    Level of Risk/Frequency of Follow-up:  (1x/week)     Monitor and Evaluation    Food and Nutrient Intake: energy intake, food and beverage intake, enteral nutrition intake  Food and Nutrient Adminstration: diet order, enteral and " parenteral nutrition administration  Knowledge/Beliefs/Attitudes: food and nutrition knowledge/skill  Physical Activity and Function: nutrition-related ADLs and IADLs  Anthropometric Measurements: weight, weight change  Biochemical Data, Medical Tests and Procedures: electrolyte and renal panel, gastrointestinal profile, glucose/endocrine profile, inflammatory profile, lipid profile  Nutrition-Focused Physical Findings: overall appearance, extremities, muscles and bones     Nutrition Follow-Up    RD Follow-up?: Yes

## 2022-07-18 NOTE — PROGRESS NOTES
"[facility]  Nephrology  Progress Note    Patient Name: Tim Richards  MRN: [id number]  Admission Date: 6/27/2022  Hospital Length of Stay: 21 days  Attending Provider: Yg Kaufman MD   Primary Care Physician: Deyanira Booth MD  Principal Problem:Left ventricular assist device (LVAD) complication    Subjective:     HPI:   Tim Richards is a 56 y.o. male w/ Known CKD 3b (without prior nephrology f/u), NICM, end-stage HF, EF 10% s/p HM2 LVAD (2018) and ICD placement (2014), ventricular fibrillation (2020), GI bleed (2019), hypothyroidism, alcohol use (2014), nicotine dependence, and CHICHI amitted on 6/27/2022 for evaluation and management of decreased appetite, decreased and dark-colored urine, and increasing shortness of breath.     History obtained from EMR and family at bedside.    In the ED, pt presented with the following VS:   Initial Vitals   BP Pulse Resp Temp SpO2   06/27/22 1129 06/27/22 1038 06/27/22 1038 06/27/22 1038 06/27/22 1810   (!) 94/0 93 20 98 °F (36.7 °C) 95 %      MAP       --                Pt was found to be in acute decompensated heart failure, hypoxic, and severely volume overloaded, for which he was started on BIPAP and on lasix gtt. On 6/30 pt had ventricular tachycardia with ICD shock, this was believed to be 2/2 hypokalemia.   His LVAD was interrogated and there was a concern of LVAD thrombosis and pt was started on Integrilin,however whilke on medical management pt continue with worsening hemolysis and so CTS was consulted and recommended upgrading to HM3 and on 7/13 pt underwent HM 2 explantation, and implantation of HM 3 LVAD. Intraoperatively pt had significant vasoplegic shock and pt was placed on ECMO as well as on vasopressors and steroids for vasodilatory shock. Pt remained intubated postoperatively.   Of note, on admission pt was found to be COVID 19 + and was treated with Remdesivir.     Nephrology was consulted for: "may soon have indication " "for dialysis"  Baseline sCr: 1.9-2.3  On admission his Cr was: 2.5    On 7/13 (day of surgery) his I/Os were as follows: 20L/4.4L, net +15.6L, urine 1965mls and 2.5L from chest tube    On 7/14 to 7/15 his I/Os were: 6.4/5.5L,net 866mls, 1.6L chest tube and 4L urine         Interval History:   Pt remains intubated, on ECMO, heparin gtt, and on pressors Epi & levophed  Lasix gtt restarted at midnight, however pt did receive acetazolamide 500mg x1 yesterday,   500ml free water given 7/17, NG tube has been removed   sNa  147-->145 this morning  sCr 4.1-->3.3  T. Catracho 8.5-->10.4  Urine labs: Shelly 119 and uOsm 365    I/O: 2.2L/6.7L urine and 300ml from chest tube drains, net -4.9L in the last 24 hours and >9L in the last 48 hours    Review of patient's allergies indicates:   Allergen Reactions    Lisinopril Anaphylaxis    Hydralazine analogues      Chronic constipation, impotence, dizziness     Current Facility-Administered Medications   Medication Frequency    0.9%  NaCl infusion PRN    dexmedetomidine (PRECEDEX) 400mcg/100mL 0.9% NaCL infusion Continuous    dextrose 10% bolus 125 mL PRN    dextrose 10% bolus 250 mL PRN    EPINEPHrine (ADRENALIN) 10 mg in dextrose 5 % 250 mL infusion Continuous    furosemide (LASIX) 200 mg in dextrose 5 % 100 mL continuous infusion (conc: 2 mg/mL) Continuous    heparin 25,000 units in dextrose 5% 250 mL (100 units/mL) infusion (heparin infusion - NO NOMOGRAM) Continuous    HYDROmorphone injection 0.5 mg Q4H PRN    HYDROmorphone injection 1 mg Q4H PRN    insulin regular in 0.9 % NaCl 100 unit/100 mL (1 unit/mL) infusion Continuous    levothyroxine tablet 112 mcg Before breakfast    mupirocin 2 % ointment BID    nitric oxide gas Gas 5 ppm Continuous    NORepinephrine 8 mg in dextrose 5% 250 mL infusion Continuous    pantoprazole injection 40 mg BID    propofol (DIPRIVAN) 10 mg/mL infusion Continuous    vasopressin (PITRESSIN) 1 Units/mL in dextrose 5 % 100 mL infusion " Continuous       Objective:     Vital Signs (Most Recent):  Temp: 98.06 °F (36.7 °C) (07/18/22 0000)  Pulse: 101 (07/18/22 0000)  Resp: (!) 23 (07/18/22 0012)  BP: (!) 82/0 (07/17/22 2315)  SpO2: 96 % (07/17/22 1915)  O2 Device (Oxygen Therapy): ventilator (07/17/22 2115)   Vital Signs (24h Range):  Temp:  [97.88 °F (36.6 °C)-98.24 °F (36.8 °C)] 98.06 °F (36.7 °C)  Pulse:  [] 101  Resp:  [14-24] 23  SpO2:  [96 %-100 %] 96 %  BP: (78-82)/(0) 82/0  Arterial Line BP: (75-89)/(61-74) 82/69     Weight: 118.8 kg (262 lb) (07/17/22 0533)  Body mass index is 34.57 kg/m².  Body surface area is 2.47 meters squared.    I/O last 3 completed shifts:  In: 3224.8 [I.V.:2331.4; NG/GT:520; IV Piggyback:373.4]  Out: 03143 [Urine:9562; Chest Tube:769]    Physical Exam  Vitals and nursing note reviewed.   Constitutional:       General: He is in acute distress.      Appearance: He is ill-appearing. He is not toxic-appearing or diaphoretic.   HENT:      Mouth/Throat:      Mouth: Mucous membranes are moist.   Eyes:      General: No scleral icterus.  Cardiovascular:      Rate and Rhythm: Normal rate and regular rhythm.      Pulses: Normal pulses.      Heart sounds: Murmur heard.      Comments: Intubated   Ecmo lines   + R IJ Trialysis catheter   Pulmonary:      Effort: Pulmonary effort is normal. No respiratory distress.      Breath sounds: Normal breath sounds. No stridor. No wheezing, rhonchi or rales.      Comments: intubated  Abdominal:      General: There is no distension.      Palpations: There is no mass.   Genitourinary:     Comments: Dark yellow colored urine in sun collection bag   Musculoskeletal:         General: Swelling present. No tenderness, deformity or signs of injury.      Right lower leg: Edema present.      Left lower leg: Edema present.   Skin:     General: Skin is warm.      Capillary Refill: Capillary refill takes 2 to 3 seconds.      Coloration: Skin is pale. Skin is not jaundiced.      Findings: No  bruising, erythema, lesion or rash.       Significant Labs:  ABGs:   Recent Labs   Lab 07/18/22  0000   PH 7.489*   PCO2 33.5*   HCO3 25.5   POCSATURATED 95   BE 2     CBC:   Recent Labs   Lab 07/17/22 2135 07/18/22  0000   WBC 19.14*  --    RBC 2.86*  --    HGB 8.3*  --    HCT 26.0* 25*   PLT 84*  --    MCV 91  --    MCH 29.0  --    MCHC 31.9*  --      CMP:   Recent Labs   Lab 07/17/22 2135   *   CALCIUM 9.0   ALBUMIN 2.1*   PROT 5.3*      K 4.3   CO2 24      BUN 78*   CREATININE 3.5*   ALKPHOS 106   ALT 65*   *   BILITOT 10.4*     LFTs:   Recent Labs   Lab 07/17/22 2135   ALT 65*   *   ALKPHOS 106   BILITOT 10.4*   PROT 5.3*   ALBUMIN 2.1*     All labs within the past 24 hours have been reviewed.     Significant Imaging:  Labs: Reviewed  X-Ray: Reviewed    Assessment/Plan:     WAGNER (acute kidney injury)  -Ischemic ATN 2/2 decrease perfusion severe hypotension in a patient with known CKD 3b  Baseline sCr: 1.9-2.3  On admission his Cr was: 2.5  Lab Results   Component Value Date    CREATININE 3.3 (H) 07/18/2022     7/15 urine microscopy showed: many granular casts, muddy brown casts, waxy casts, some WBCS, some yeast, and occasional RBCs    Recommendations:   -Pt is net -9 L in the past 48 hours, concentrated urine with urine Osm 365  -would hold diuretics today, discussed with primary team and they will manage diuretics   -Electrolytes: K 3.9, renal diet, page nephrology if K >5.5    Phos level 4.3, continue phos binders Sevelamer 800mg tid IF not NPO   Mg 2.3, goal >2  -Acid/base: AGMA 2/2 ATN  -Fluid balance: -4.5L in the last 24 hours, pt is 3rd spacing as his albumin is 2   -Anemia: Hgb 8.2, send anemia panel labs, transfuse for Hgb <7.0  -Strict I/O's and daily weights  -Renal function panel and Mg levels Q8H   -Renal ultrasound reviewed, no hydronephrosis and minimal CKD changes  -Maintain MAP >65 for renal perfusion    There is no immediate indication for KRT at this  time        Dilated cardiomyopathy  S/p ICD placement and 7/13 pt underwent HM 2 explantation, and implantation of HM 3 LVAD.  -Plan per primary team       Leukocytosis  Lab Results   Component Value Date    WBC 18.08 (H) 07/18/2022     -Plan per primary team       LVAD (left ventricular assist device) present  -Plan per primary team       Chronic combined systolic and diastolic heart failure  Pt is net ->9 L in the past 48 hours  -would hold diuretics today, discussed with primary team and they will manage diuretics   -Plan per primary team           Thank you for your consult. I will follow-up with patient. Please contact us if you have any additional questions.    Holly Spangler MD  Nephrology  [facility]

## 2022-07-18 NOTE — ASSESSMENT & PLAN NOTE
-Ischemic ATN 2/2 decrease perfusion severe hypotension in a patient with known CKD 3b  Baseline sCr: 1.9-2.3  On admission his Cr was: 2.5  Lab Results   Component Value Date    CREATININE 3.3 (H) 07/18/2022     7/15 urine microscopy showed: many granular casts, muddy brown casts, waxy casts, some WBCS, some yeast, and occasional RBCs    Recommendations:   -Pt is net -9 L in the past 48 hours  -would hold diuretics today, discussed with primary team and they will manage diuretics   -Electrolytes: K 3.9, renal diet, page nephrology if K >5.5    Phos level 4.3, continue phos binders Sevelamer 800mg tid IF not NPO   Mg 2.3, goal >2  -Acid/base: AGMA 2/2 ATN  -Fluid balance: -4.5L in the last 24 hours, pt is 3rd spacing as his albumin is 2   -Anemia: Hgb 8.2, send anemia panel labs, transfuse for Hgb <7.0  -Strict I/O's and daily weights  -Renal function panel and Mg levels Q8H   -Renal ultrasound   -Maintain MAP >65 for renal perfusion    There is no immediate indication for KRT at this time

## 2022-07-18 NOTE — PLAN OF CARE
Recommendations    1) Advance diet as medically appropriate to cardiac diet - fluid per physician.     2) Continue tube feeds of Novasource Renal. Rec'd advance to goal rate of 45 ml/hr as medically feasible to provide 2160 kcal, 98g protein, 774mL water - meets 100% of kcal/protein needs.     3) RD to monitor and f/u.    Goals: Continue to meet % EEN/EPN by RD f/u date  Nutrition Goal Status: new  Communication of RD Recs: POC

## 2022-07-18 NOTE — ANESTHESIA POSTPROCEDURE EVALUATION
Anesthesia Post Evaluation    Patient: Tim Richards    Procedure(s) Performed: Procedure(s) (LRB):  CLOSURE, WOUND, STERNUM (N/A)  APPLICATION, WOUND VAC (N/A)  INSERTION, GRAFT, PERICARDIUM (N/A)  IRRIGATION, MEDIASTINUM (N/A)    Final Anesthesia Type: general      Patient location during evaluation: ICU  Patient participation: No - Unable to Participate, Intubation  Level of consciousness: sedated  Post-procedure vital signs: reviewed and stable  Pain management: adequate  Airway patency: patent    PONV status at discharge: No PONV  Anesthetic complications: no      Cardiovascular status: stable (levo and giaprezza weaned off. On minimal pressors)  Respiratory status: ventilator  Hydration status: euvolemic  Follow-up not needed.          Vitals Value Taken Time   BP 80/0 07/18/22 0415   Temp 36.7 °C (98.06 °F) 07/18/22 0637   Pulse 102 07/18/22 0637   Resp 23 07/18/22 0423   SpO2 96 % 07/18/22 0415   Vitals shown include unvalidated device data.      No case tracking events are documented in the log.      Pain/Iker Score: Pain Rating Prior to Med Admin: 5 (7/18/2022  4:23 AM)

## 2022-07-18 NOTE — PROGRESS NOTES
[facility]ists shift report    Date: 07/18/2022  ECMO Specialist:  Swati Teixeira    Pump parameters:  RPM: 3400  Flow:  2.7  Sweep: 4  FiO2:  100    Oxygenator status:  Clots: pre oxy  Fibrin: large amount post oxy, connecters     Pressure trends:  P1: 225-250  P2: 205-230  Delta P: 17-23    Volume status:  Chugging noted? None  CVP: 4-6  MAP: mid 60s-70s  MD notified (name):  Dr. Oquendo    Anticoagulation:  aPTT parameters: 40-80  aPTT trends this shift: 28.8    Cannula size / status / placement:  Return cannula / from circuit to patient:  18FR RFA 9.5cm [employer]isite  Drainage cannula / from patient to circuit: 27Fr LFV 5.5cm [employer]     Additional Comments:   CV: Epi continuous, Vaso was weaned off around 3pm, restarted at 4:40p (decreased MAPs- mid 60s)  Pulm: FiO2 weaned to 50%  FEN/GI: mag citrate given, trickle feeds started. NPO at midnight for possible decannulation tomorrow  HEME: heparin increased to 800units/hour  NEPH: -175mL/hr; lasix drip at 1  ECMO: RPMs weaned to 3400 at 4:30pm, no further weaning of RPMs. Wean sweep by 0.5L as ABG tolerates, no lower than 2L.    Will continue to monitor. Please refer to flowsheets for additional information.

## 2022-07-18 NOTE — SUBJECTIVE & OBJECTIVE
Interval History/Significant Events: No acute events overnight. Hemodynamically stable on epi 0.06 and vaso 0.04. Briefly had to be on levo yesterday but was weaned off. PaO2 has been persistently lower since yesterday morning despite increasing FiO2.    Follow-up For: Procedure(s) (LRB):  CLOSURE, WOUND, STERNUM (N/A)  APPLICATION, WOUND VAC (N/A)  INSERTION, GRAFT, PERICARDIUM (N/A)  IRRIGATION, MEDIASTINUM (N/A)    Post-Operative Day: 3 Days Post-Op    Objective:     Vital Signs (Most Recent):  Temp: 98.06 °F (36.7 °C) (07/18/22 0600)  Pulse: 101 (07/18/22 0600)  Resp: (!) 23 (07/18/22 0423)  BP: (!) 80/0 (07/18/22 0415)  SpO2: 96 % (From ABG) (07/18/22 0415)   Vital Signs (24h Range):  Temp:  [97.34 °F (36.3 °C)-98.06 °F (36.7 °C)] 98.06 °F (36.7 °C)  Pulse:  [] 101  Resp:  [14-24] 23  SpO2:  [96 %-100 %] 96 %  BP: (78-82)/(0) 80/0  Arterial Line BP: (75-89)/(61-75) 85/72     Weight: 118.8 kg (262 lb)  Body mass index is 34.57 kg/m².      Intake/Output Summary (Last 24 hours) at 7/18/2022 0611  Last data filed at 7/18/2022 0600  Gross per 24 hour   Intake 2359.86 ml   Output 7482 ml   Net -5122.14 ml       Physical Exam  Constitutional:       Comments: Intubated and sedated   HENT:      Head: Normocephalic and atraumatic.      Mouth/Throat:      Comments: OG in place  Eyes:      Pupils: Pupils are equal, round, and reactive to light.   Neck:      Comments: L IJ CVC  R IJ trialysis  Cardiovascular:      Rate and Rhythm: Regular rhythm. Tachycardia present.      Comments: On V-A ECMO -- 4000 rpm, 3.5L, sweep 4, FiO2 100% -- clots present on both sides of oxygenator  LVAD in place -- 5500 rpm, 4.0L    Midline sternotomy c/d with dressing in place    2 plerual and 2 mediastinal chest tubes to suction.    V pacing wires in place.   Pulmonary:      Comments: Mechanically ventilated  Abdominal:      General: There is no distension.      Palpations: Abdomen is soft.   Genitourinary:     Comments: Lagunas in place  draining clear urine  Musculoskeletal:      Comments: ECMO cannula x2 (right femoral artery, right femoral vein)    L brachial, right radial arterial line   Skin:     General: Skin is warm and dry.   Neurological:      Comments: Following commands when sedation paused       Vents:  Vent Mode: A/C (07/18/22 0513)  Ventilator Initiated: Yes (07/15/22 1027)  Set Rate: 14 BPM (07/18/22 0513)  Vt Set: 400 mL (07/18/22 0513)  PEEP/CPAP: 7 cmH20 (07/18/22 0513)  Oxygen Concentration (%): 70 (07/18/22 0600)  Peak Airway Pressure: 31 cmH2O (07/18/22 0513)  Plateau Pressure: 21 cmH20 (07/18/22 0513)  Total Ve: 8.48 mL (07/18/22 0513)  Negative Inspiratory Force (cm H2O): 0 (07/18/22 0513)  F/VT Ratio<105 (RSBI): (!) 54.59 (07/17/22 1530)    Lines/Drains/Airways       Central Venous Catheter Line  Duration                  ECMO Cannula 07/13/22 right femoral artery 5 days         ECMO Cannula 07/13/22 right femoral vein 5 days     Introducer with Double Lumen 07/13/22 0900 left internal jugular 4 days    Percutaneous Central Line Insertion/Assessment - Quad Lumen  07/13/22 0941 left internal jugular 4 days    Trialysis (Dialysis) Catheter 07/13/22 2100 right internal jugular 4 days              Drain  Duration                  Chest Tube 07/13/22 1916 1 Right Pleural 32 Fr. 4 days         Chest Tube 07/13/22 1916 2 Left Pleural 32 Fr. 4 days         Chest Tube 07/13/22 1916 3 Anterior Mediastinal 32 Fr. 4 days         Chest Tube 07/13/22 1916 4 Posterior Mediastinal 32 Fr. 4 days         Urethral Catheter 07/13/22 0848 Temperature probe;Latex 14 Fr. 4 days         NG/OG Tube 07/15/22 1030 Center mouth 2 days              Airway  Duration                  Airway - Non-Surgical 07/13/22 0848 4 days              Arterial Line  Duration             Arterial Line 07/13/22 0932 Left Brachial 4 days    Arterial Line 07/13/22 2000 Right Radial 4 days              Line  Duration                  VAD 03/08/18 1124 Left ventricular  assist device HeartMate II 1592 days         VAD 07/13/22 1300 Left ventricular assist device HeartMate 3 4 days              Peripheral Intravenous Line  Duration                  Midline Catheter Insertion/Assessment  - Single Lumen 06/28/22 1027 Right cephalic vein (lateral side of arm) 20g x 8cm 19 days                    Significant Labs:    CBC/Anemia Profile:  Recent Labs   Lab 07/17/22  1518 07/17/22  1710 07/17/22 2135 07/18/22  0000 07/18/22  0318 07/18/22  0401   WBC 19.18*  --  19.14*  --  18.08*  --    HGB 8.7*  --  8.3*  --  8.2*  --    HCT 26.1*   < > 26.0* 25* 25.4* 25*   PLT 84*  --  84*  --  83*  --    MCV 86  --  91  --  89  --    RDW 15.6*  --  15.9*  --  15.9*  --     < > = values in this interval not displayed.        Chemistries:  Recent Labs   Lab 07/17/22  1518 07/17/22 2135 07/18/22 0318    144 145   K 3.9 4.3 3.9    107 109   CO2 26 24 23   BUN 77* 78* 75*   CREATININE 3.7* 3.5* 3.3*   CALCIUM 8.9 9.0 9.1   ALBUMIN 2.1* 2.1* 2.1*   PROT 5.3* 5.3* 5.4*   BILITOT 9.7* 10.4* 10.8*   ALKPHOS 104 106 108   ALT 68* 65* 62*   * 131* 119*   MG 2.3 2.3 2.3   PHOS 4.3 4.1 4.3       ABGs:   Recent Labs   Lab 07/18/22  0401   PH 7.480*   PCO2 33.4*   HCO3 24.9   POCSATURATED 96   BE 1     Coagulation:   Recent Labs   Lab 07/18/22 0318   INR 1.1   APTT 28.1     Lactic Acid: No results for input(s): LACTATE in the last 48 hours.  All pertinent labs within the past 24 hours have been reviewed.    Significant Imaging:  I have reviewed all pertinent imaging results/findings within the past 24 hours.

## 2022-07-19 PROBLEM — E87.0 HYPERNATREMIA: Status: ACTIVE | Noted: 2022-07-19

## 2022-07-19 LAB
ALBUMIN SERPL BCP-MCNC: 1.9 G/DL (ref 3.5–5.2)
ALBUMIN SERPL BCP-MCNC: 2 G/DL (ref 3.5–5.2)
ALLENS TEST: ABNORMAL
ALLENS TEST: ABNORMAL
ALLENS TEST: NORMAL
ALLENS TEST: NORMAL
ALP SERPL-CCNC: 113 U/L (ref 55–135)
ALP SERPL-CCNC: 117 U/L (ref 55–135)
ALP SERPL-CCNC: 119 U/L (ref 55–135)
ALP SERPL-CCNC: 121 U/L (ref 55–135)
ALT SERPL W/O P-5'-P-CCNC: 48 U/L (ref 10–44)
ALT SERPL W/O P-5'-P-CCNC: 48 U/L (ref 10–44)
ALT SERPL W/O P-5'-P-CCNC: 49 U/L (ref 10–44)
ALT SERPL W/O P-5'-P-CCNC: 50 U/L (ref 10–44)
ANION GAP SERPL CALC-SCNC: 10 MMOL/L (ref 8–16)
ANION GAP SERPL CALC-SCNC: 11 MMOL/L (ref 8–16)
ANION GAP SERPL CALC-SCNC: 8 MMOL/L (ref 8–16)
ANION GAP SERPL CALC-SCNC: 9 MMOL/L (ref 8–16)
APTT BLDCRRT: 27.9 SEC (ref 21–32)
APTT BLDCRRT: 28.9 SEC (ref 21–32)
APTT BLDCRRT: 29.1 SEC (ref 21–32)
AST SERPL-CCNC: 71 U/L (ref 10–40)
AST SERPL-CCNC: 77 U/L (ref 10–40)
AST SERPL-CCNC: 77 U/L (ref 10–40)
AST SERPL-CCNC: 78 U/L (ref 10–40)
BASOPHILS # BLD AUTO: 0.01 K/UL (ref 0–0.2)
BASOPHILS # BLD AUTO: 0.02 K/UL (ref 0–0.2)
BASOPHILS # BLD AUTO: 0.05 K/UL (ref 0–0.2)
BASOPHILS NFR BLD: 0.1 % (ref 0–1.9)
BASOPHILS NFR BLD: 0.1 % (ref 0–1.9)
BASOPHILS NFR BLD: 0.3 % (ref 0–1.9)
BILIRUB SERPL-MCNC: 11.7 MG/DL (ref 0.1–1)
BILIRUB SERPL-MCNC: 12.1 MG/DL (ref 0.1–1)
BILIRUB SERPL-MCNC: 12.4 MG/DL (ref 0.1–1)
BILIRUB SERPL-MCNC: 12.6 MG/DL (ref 0.1–1)
BUN SERPL-MCNC: 63 MG/DL (ref 6–20)
BUN SERPL-MCNC: 65 MG/DL (ref 6–20)
BUN SERPL-MCNC: 65 MG/DL (ref 6–20)
BUN SERPL-MCNC: 66 MG/DL (ref 6–20)
CALCIUM SERPL-MCNC: 9 MG/DL (ref 8.7–10.5)
CALCIUM SERPL-MCNC: 9.2 MG/DL (ref 8.7–10.5)
CALCIUM SERPL-MCNC: 9.3 MG/DL (ref 8.7–10.5)
CALCIUM SERPL-MCNC: 9.4 MG/DL (ref 8.7–10.5)
CHLORIDE SERPL-SCNC: 114 MMOL/L (ref 95–110)
CHLORIDE SERPL-SCNC: 114 MMOL/L (ref 95–110)
CHLORIDE SERPL-SCNC: 118 MMOL/L (ref 95–110)
CHLORIDE SERPL-SCNC: 119 MMOL/L (ref 95–110)
CO2 SERPL-SCNC: 22 MMOL/L (ref 23–29)
CO2 SERPL-SCNC: 23 MMOL/L (ref 23–29)
CREAT SERPL-MCNC: 2.3 MG/DL (ref 0.5–1.4)
CREAT SERPL-MCNC: 2.4 MG/DL (ref 0.5–1.4)
CREAT SERPL-MCNC: 2.5 MG/DL (ref 0.5–1.4)
CREAT SERPL-MCNC: 2.5 MG/DL (ref 0.5–1.4)
DIFFERENTIAL METHOD: ABNORMAL
EOSINOPHIL # BLD AUTO: 0.1 K/UL (ref 0–0.5)
EOSINOPHIL NFR BLD: 0.6 % (ref 0–8)
EOSINOPHIL NFR BLD: 0.8 % (ref 0–8)
EOSINOPHIL NFR BLD: 0.9 % (ref 0–8)
ERYTHROCYTE [DISTWIDTH] IN BLOOD BY AUTOMATED COUNT: 17.1 % (ref 11.5–14.5)
ERYTHROCYTE [DISTWIDTH] IN BLOOD BY AUTOMATED COUNT: 17.2 % (ref 11.5–14.5)
ERYTHROCYTE [DISTWIDTH] IN BLOOD BY AUTOMATED COUNT: 17.4 % (ref 11.5–14.5)
EST. GFR  (AFRICAN AMERICAN): 32.2 ML/MIN/1.73 M^2
EST. GFR  (AFRICAN AMERICAN): 32.2 ML/MIN/1.73 M^2
EST. GFR  (AFRICAN AMERICAN): 33.8 ML/MIN/1.73 M^2
EST. GFR  (AFRICAN AMERICAN): 35.6 ML/MIN/1.73 M^2
EST. GFR  (NON AFRICAN AMERICAN): 27.9 ML/MIN/1.73 M^2
EST. GFR  (NON AFRICAN AMERICAN): 27.9 ML/MIN/1.73 M^2
EST. GFR  (NON AFRICAN AMERICAN): 29.3 ML/MIN/1.73 M^2
EST. GFR  (NON AFRICAN AMERICAN): 30.8 ML/MIN/1.73 M^2
FIBRINOGEN PPP-MCNC: >800 MG/DL (ref 182–400)
FIO2: 80
GLUCOSE SERPL-MCNC: 130 MG/DL (ref 70–110)
GLUCOSE SERPL-MCNC: 134 MG/DL (ref 70–110)
GLUCOSE SERPL-MCNC: 142 MG/DL (ref 70–110)
GLUCOSE SERPL-MCNC: 154 MG/DL (ref 70–110)
HCO3 UR-SCNC: 23.1 MMOL/L (ref 24–28)
HCO3 UR-SCNC: 23.5 MMOL/L (ref 24–28)
HCO3 UR-SCNC: 23.6 MMOL/L (ref 24–28)
HCO3 UR-SCNC: 23.8 MMOL/L (ref 24–28)
HCO3 UR-SCNC: 24.1 MMOL/L (ref 24–28)
HCO3 UR-SCNC: 24.2 MMOL/L (ref 24–28)
HCO3 UR-SCNC: 24.3 MMOL/L (ref 24–28)
HCO3 UR-SCNC: 24.3 MMOL/L (ref 24–28)
HCO3 UR-SCNC: 24.4 MMOL/L (ref 24–28)
HCO3 UR-SCNC: 24.7 MMOL/L (ref 24–28)
HCO3 UR-SCNC: 24.7 MMOL/L (ref 24–28)
HCO3 UR-SCNC: 25.5 MMOL/L (ref 24–28)
HCT VFR BLD AUTO: 24.6 % (ref 40–54)
HCT VFR BLD AUTO: 25.5 % (ref 40–54)
HCT VFR BLD AUTO: 25.8 % (ref 40–54)
HCT VFR BLD CALC: 22 %PCV (ref 36–54)
HCT VFR BLD CALC: 23 %PCV (ref 36–54)
HCT VFR BLD CALC: 23 %PCV (ref 36–54)
HCT VFR BLD CALC: 24 %PCV (ref 36–54)
HCT VFR BLD CALC: 24 %PCV (ref 36–54)
HCT VFR BLD CALC: 27 %PCV (ref 36–54)
HCT VFR BLD CALC: 30 %PCV (ref 36–54)
HCT VFR BLD CALC: 33 %PCV (ref 36–54)
HGB BLD-MCNC: 7.7 G/DL (ref 14–18)
HGB BLD-MCNC: 8 G/DL (ref 14–18)
HGB BLD-MCNC: 8 G/DL (ref 14–18)
IMM GRANULOCYTES # BLD AUTO: 0.64 K/UL (ref 0–0.04)
IMM GRANULOCYTES # BLD AUTO: 0.69 K/UL (ref 0–0.04)
IMM GRANULOCYTES # BLD AUTO: 0.75 K/UL (ref 0–0.04)
IMM GRANULOCYTES NFR BLD AUTO: 4.2 % (ref 0–0.5)
IMM GRANULOCYTES NFR BLD AUTO: 4.4 % (ref 0–0.5)
IMM GRANULOCYTES NFR BLD AUTO: 4.8 % (ref 0–0.5)
INR PPP: 1.1 (ref 0.8–1.2)
INR PPP: 1.1 (ref 0.8–1.2)
INR PPP: 1.2 (ref 0.8–1.2)
LDH SERPL L TO P-CCNC: 0.86 MMOL/L (ref 0.36–1.25)
LDH SERPL L TO P-CCNC: 0.87 MMOL/L (ref 0.36–1.25)
LDH SERPL L TO P-CCNC: 0.89 MMOL/L (ref 0.36–1.25)
LDH SERPL L TO P-CCNC: 0.9 MMOL/L (ref 0.36–1.25)
LDH SERPL L TO P-CCNC: 0.93 MMOL/L (ref 0.36–1.25)
LDH SERPL L TO P-CCNC: 0.98 MMOL/L (ref 0.36–1.25)
LDH SERPL L TO P-CCNC: 1 MMOL/L (ref 0.36–1.25)
LDH SERPL L TO P-CCNC: 1.02 MMOL/L (ref 0.36–1.25)
LDH SERPL L TO P-CCNC: 588 U/L (ref 110–260)
LYMPHOCYTES # BLD AUTO: 0.6 K/UL (ref 1–4.8)
LYMPHOCYTES # BLD AUTO: 0.7 K/UL (ref 1–4.8)
LYMPHOCYTES # BLD AUTO: 0.8 K/UL (ref 1–4.8)
LYMPHOCYTES NFR BLD: 4 % (ref 18–48)
LYMPHOCYTES NFR BLD: 4.9 % (ref 18–48)
LYMPHOCYTES NFR BLD: 4.9 % (ref 18–48)
MAGNESIUM SERPL-MCNC: 2.7 MG/DL (ref 1.6–2.6)
MAGNESIUM SERPL-MCNC: 2.8 MG/DL (ref 1.6–2.6)
MCH RBC QN AUTO: 28.8 PG (ref 27–31)
MCH RBC QN AUTO: 29.1 PG (ref 27–31)
MCH RBC QN AUTO: 29.3 PG (ref 27–31)
MCHC RBC AUTO-ENTMCNC: 31 G/DL (ref 32–36)
MCHC RBC AUTO-ENTMCNC: 31.3 G/DL (ref 32–36)
MCHC RBC AUTO-ENTMCNC: 31.4 G/DL (ref 32–36)
MCV RBC AUTO: 92 FL (ref 82–98)
MCV RBC AUTO: 93 FL (ref 82–98)
MCV RBC AUTO: 95 FL (ref 82–98)
METHEMOGLOBIN: 0.3 % (ref 0–3)
MONOCYTES # BLD AUTO: 1.8 K/UL (ref 0.3–1)
MONOCYTES # BLD AUTO: 1.8 K/UL (ref 0.3–1)
MONOCYTES # BLD AUTO: 1.9 K/UL (ref 0.3–1)
MONOCYTES NFR BLD: 11.6 % (ref 4–15)
MONOCYTES NFR BLD: 11.7 % (ref 4–15)
MONOCYTES NFR BLD: 12.2 % (ref 4–15)
NEUTROPHILS # BLD AUTO: 11.9 K/UL (ref 1.8–7.7)
NEUTROPHILS # BLD AUTO: 12.2 K/UL (ref 1.8–7.7)
NEUTROPHILS # BLD AUTO: 12.3 K/UL (ref 1.8–7.7)
NEUTROPHILS NFR BLD: 78 % (ref 38–73)
NEUTROPHILS NFR BLD: 78.1 % (ref 38–73)
NEUTROPHILS NFR BLD: 78.4 % (ref 38–73)
NRBC BLD-RTO: 0 /100 WBC
PCO2 BLDA: 39 MMHG (ref 35–45)
PCO2 BLDA: 39.3 MMHG (ref 35–45)
PCO2 BLDA: 39.8 MMHG (ref 35–45)
PCO2 BLDA: 40.1 MMHG (ref 35–45)
PCO2 BLDA: 40.7 MMHG (ref 35–45)
PCO2 BLDA: 41.9 MMHG (ref 35–45)
PCO2 BLDA: 42.1 MMHG (ref 35–45)
PCO2 BLDA: 42.5 MMHG (ref 35–45)
PCO2 BLDA: 42.5 MMHG (ref 35–45)
PCO2 BLDA: 42.9 MMHG (ref 35–45)
PCO2 BLDA: 45.4 MMHG (ref 35–45)
PCO2 BLDA: 48.8 MMHG (ref 35–45)
PH SMN: 7.33 [PH] (ref 7.35–7.45)
PH SMN: 7.34 [PH] (ref 7.35–7.45)
PH SMN: 7.36 [PH] (ref 7.35–7.45)
PH SMN: 7.37 [PH] (ref 7.35–7.45)
PH SMN: 7.37 [PH] (ref 7.35–7.45)
PH SMN: 7.38 [PH] (ref 7.35–7.45)
PH SMN: 7.38 [PH] (ref 7.35–7.45)
PH SMN: 7.39 [PH] (ref 7.35–7.45)
PH SMN: 7.39 [PH] (ref 7.35–7.45)
PH SMN: 7.4 [PH] (ref 7.35–7.45)
PHOSPHATE SERPL-MCNC: 3.3 MG/DL (ref 2.7–4.5)
PHOSPHATE SERPL-MCNC: 3.4 MG/DL (ref 2.7–4.5)
PHOSPHATE SERPL-MCNC: 3.5 MG/DL (ref 2.7–4.5)
PHOSPHATE SERPL-MCNC: 4 MG/DL (ref 2.7–4.5)
PLATELET # BLD AUTO: 117 K/UL (ref 150–450)
PLATELET # BLD AUTO: 132 K/UL (ref 150–450)
PLATELET # BLD AUTO: 99 K/UL (ref 150–450)
PMV BLD AUTO: 12.5 FL (ref 9.2–12.9)
PMV BLD AUTO: 12.8 FL (ref 9.2–12.9)
PMV BLD AUTO: 13.4 FL (ref 9.2–12.9)
PO2 BLDA: 100 MMHG (ref 80–100)
PO2 BLDA: 102 MMHG (ref 80–100)
PO2 BLDA: 104 MMHG (ref 80–100)
PO2 BLDA: 116 MMHG (ref 80–100)
PO2 BLDA: 227 MMHG (ref 80–100)
PO2 BLDA: 34 MMHG (ref 40–60)
PO2 BLDA: 49 MMHG (ref 80–100)
PO2 BLDA: 84 MMHG (ref 80–100)
PO2 BLDA: 90 MMHG (ref 80–100)
PO2 BLDA: 92 MMHG (ref 80–100)
PO2 BLDA: 97 MMHG (ref 80–100)
PO2 BLDA: 97 MMHG (ref 80–100)
POC BE: -1 MMOL/L
POC BE: -2 MMOL/L
POC BE: 0 MMOL/L
POC BE: 0 MMOL/L
POC IONIZED CALCIUM: 1.25 MMOL/L (ref 1.06–1.42)
POC IONIZED CALCIUM: 1.26 MMOL/L (ref 1.06–1.42)
POC IONIZED CALCIUM: 1.27 MMOL/L (ref 1.06–1.42)
POC SATURATED O2: 100 % (ref 95–100)
POC SATURATED O2: 61 % (ref 95–100)
POC SATURATED O2: 83 % (ref 95–100)
POC SATURATED O2: 96 % (ref 95–100)
POC SATURATED O2: 97 % (ref 95–100)
POC SATURATED O2: 98 % (ref 95–100)
POC TCO2: 24 MMOL/L (ref 23–27)
POC TCO2: 25 MMOL/L (ref 23–27)
POC TCO2: 26 MMOL/L (ref 23–27)
POC TCO2: 27 MMOL/L (ref 24–29)
POCT GLUCOSE: 136 MG/DL (ref 70–110)
POCT GLUCOSE: 142 MG/DL (ref 70–110)
POTASSIUM BLD-SCNC: 4.1 MMOL/L (ref 3.5–5.1)
POTASSIUM BLD-SCNC: 4.3 MMOL/L (ref 3.5–5.1)
POTASSIUM BLD-SCNC: 4.4 MMOL/L (ref 3.5–5.1)
POTASSIUM BLD-SCNC: 4.4 MMOL/L (ref 3.5–5.1)
POTASSIUM BLD-SCNC: 4.6 MMOL/L (ref 3.5–5.1)
POTASSIUM BLD-SCNC: 4.6 MMOL/L (ref 3.5–5.1)
POTASSIUM BLD-SCNC: 4.8 MMOL/L (ref 3.5–5.1)
POTASSIUM BLD-SCNC: 4.8 MMOL/L (ref 3.5–5.1)
POTASSIUM SERPL-SCNC: 3.8 MMOL/L (ref 3.5–5.1)
POTASSIUM SERPL-SCNC: 4.2 MMOL/L (ref 3.5–5.1)
POTASSIUM SERPL-SCNC: 4.5 MMOL/L (ref 3.5–5.1)
POTASSIUM SERPL-SCNC: 4.7 MMOL/L (ref 3.5–5.1)
PROT SERPL-MCNC: 5.5 G/DL (ref 6–8.4)
PROT SERPL-MCNC: 5.6 G/DL (ref 6–8.4)
PROT SERPL-MCNC: 5.7 G/DL (ref 6–8.4)
PROT SERPL-MCNC: 5.7 G/DL (ref 6–8.4)
PROTHROMBIN TIME: 11.8 SEC (ref 9–12.5)
PROTHROMBIN TIME: 11.8 SEC (ref 9–12.5)
PROTHROMBIN TIME: 12.2 SEC (ref 9–12.5)
RBC # BLD AUTO: 2.67 M/UL (ref 4.6–6.2)
RBC # BLD AUTO: 2.73 M/UL (ref 4.6–6.2)
RBC # BLD AUTO: 2.75 M/UL (ref 4.6–6.2)
SAMPLE: ABNORMAL
SAMPLE: NORMAL
SITE: ABNORMAL
SITE: ABNORMAL
SITE: NORMAL
SITE: NORMAL
SODIUM BLD-SCNC: 151 MMOL/L (ref 136–145)
SODIUM BLD-SCNC: 152 MMOL/L (ref 136–145)
SODIUM BLD-SCNC: 152 MMOL/L (ref 136–145)
SODIUM BLD-SCNC: 153 MMOL/L (ref 136–145)
SODIUM SERPL-SCNC: 146 MMOL/L (ref 136–145)
SODIUM SERPL-SCNC: 147 MMOL/L (ref 136–145)
SODIUM SERPL-SCNC: 149 MMOL/L (ref 136–145)
SODIUM SERPL-SCNC: 150 MMOL/L (ref 136–145)
WBC # BLD AUTO: 15.13 K/UL (ref 3.9–12.7)
WBC # BLD AUTO: 15.62 K/UL (ref 3.9–12.7)
WBC # BLD AUTO: 15.75 K/UL (ref 3.9–12.7)

## 2022-07-19 PROCEDURE — 25000003 PHARM REV CODE 250: Performed by: ANESTHESIOLOGY

## 2022-07-19 PROCEDURE — 99900026 HC AIRWAY MAINTENANCE (STAT)

## 2022-07-19 PROCEDURE — 63600175 PHARM REV CODE 636 W HCPCS: Performed by: ANESTHESIOLOGY

## 2022-07-19 PROCEDURE — 83735 ASSAY OF MAGNESIUM: CPT | Mod: 91 | Performed by: STUDENT IN AN ORGANIZED HEALTH CARE EDUCATION/TRAINING PROGRAM

## 2022-07-19 PROCEDURE — 63600175 PHARM REV CODE 636 W HCPCS: Performed by: STUDENT IN AN ORGANIZED HEALTH CARE EDUCATION/TRAINING PROGRAM

## 2022-07-19 PROCEDURE — 93005 ELECTROCARDIOGRAM TRACING: CPT

## 2022-07-19 PROCEDURE — 27000221 HC OXYGEN, UP TO 24 HOURS

## 2022-07-19 PROCEDURE — 83735 ASSAY OF MAGNESIUM: CPT | Mod: 91 | Performed by: THORACIC SURGERY (CARDIOTHORACIC VASCULAR SURGERY)

## 2022-07-19 PROCEDURE — 99291 PR CRITICAL CARE, E/M 30-74 MINUTES: ICD-10-PCS | Mod: ,,, | Performed by: ANESTHESIOLOGY

## 2022-07-19 PROCEDURE — 93750 INTERROGATION VAD IN PERSON: CPT | Mod: ,,, | Performed by: INTERNAL MEDICINE

## 2022-07-19 PROCEDURE — 37799 UNLISTED PX VASCULAR SURGERY: CPT

## 2022-07-19 PROCEDURE — 85730 THROMBOPLASTIN TIME PARTIAL: CPT | Performed by: THORACIC SURGERY (CARDIOTHORACIC VASCULAR SURGERY)

## 2022-07-19 PROCEDURE — 27000248 HC VAD-ADDITIONAL DAY

## 2022-07-19 PROCEDURE — 85014 HEMATOCRIT: CPT

## 2022-07-19 PROCEDURE — 63600367 HC NITRIC OXIDE PER HOUR

## 2022-07-19 PROCEDURE — 63600175 PHARM REV CODE 636 W HCPCS

## 2022-07-19 PROCEDURE — 93010 ELECTROCARDIOGRAM REPORT: CPT | Mod: ,,, | Performed by: INTERNAL MEDICINE

## 2022-07-19 PROCEDURE — 99233 PR SUBSEQUENT HOSPITAL CARE,LEVL III: ICD-10-PCS | Mod: ,,, | Performed by: INTERNAL MEDICINE

## 2022-07-19 PROCEDURE — 99291 CRITICAL CARE FIRST HOUR: CPT | Mod: 25,,, | Performed by: INTERNAL MEDICINE

## 2022-07-19 PROCEDURE — 85610 PROTHROMBIN TIME: CPT | Performed by: THORACIC SURGERY (CARDIOTHORACIC VASCULAR SURGERY)

## 2022-07-19 PROCEDURE — 82330 ASSAY OF CALCIUM: CPT

## 2022-07-19 PROCEDURE — 82803 BLOOD GASES ANY COMBINATION: CPT

## 2022-07-19 PROCEDURE — 93750 PR INTERROGATE VENT ASSIST DEV, IN PERSON, W PHYSICIAN ANALYSIS: ICD-10-PCS | Mod: ,,, | Performed by: INTERNAL MEDICINE

## 2022-07-19 PROCEDURE — 84100 ASSAY OF PHOSPHORUS: CPT | Mod: 91 | Performed by: STUDENT IN AN ORGANIZED HEALTH CARE EDUCATION/TRAINING PROGRAM

## 2022-07-19 PROCEDURE — C9113 INJ PANTOPRAZOLE SODIUM, VIA: HCPCS

## 2022-07-19 PROCEDURE — 20000000 HC ICU ROOM

## 2022-07-19 PROCEDURE — 36592 COLLECT BLOOD FROM PICC: CPT

## 2022-07-19 PROCEDURE — 83605 ASSAY OF LACTIC ACID: CPT

## 2022-07-19 PROCEDURE — 84132 ASSAY OF SERUM POTASSIUM: CPT

## 2022-07-19 PROCEDURE — 94761 N-INVAS EAR/PLS OXIMETRY MLT: CPT

## 2022-07-19 PROCEDURE — 80053 COMPREHEN METABOLIC PANEL: CPT | Mod: 91 | Performed by: STUDENT IN AN ORGANIZED HEALTH CARE EDUCATION/TRAINING PROGRAM

## 2022-07-19 PROCEDURE — 25000003 PHARM REV CODE 250: Performed by: THORACIC SURGERY (CARDIOTHORACIC VASCULAR SURGERY)

## 2022-07-19 PROCEDURE — 82800 BLOOD PH: CPT

## 2022-07-19 PROCEDURE — 85384 FIBRINOGEN ACTIVITY: CPT | Performed by: THORACIC SURGERY (CARDIOTHORACIC VASCULAR SURGERY)

## 2022-07-19 PROCEDURE — 99900035 HC TECH TIME PER 15 MIN (STAT)

## 2022-07-19 PROCEDURE — 33949 ECMO/ECLS DAILY MGMT ARTERY: CPT

## 2022-07-19 PROCEDURE — 99291 CRITICAL CARE FIRST HOUR: CPT | Mod: ,,, | Performed by: ANESTHESIOLOGY

## 2022-07-19 PROCEDURE — 85025 COMPLETE CBC W/AUTO DIFF WBC: CPT | Mod: 91 | Performed by: THORACIC SURGERY (CARDIOTHORACIC VASCULAR SURGERY)

## 2022-07-19 PROCEDURE — 94003 VENT MGMT INPAT SUBQ DAY: CPT

## 2022-07-19 PROCEDURE — 99291 PR CRITICAL CARE, E/M 30-74 MINUTES: ICD-10-PCS | Mod: 25,,, | Performed by: INTERNAL MEDICINE

## 2022-07-19 PROCEDURE — 25000003 PHARM REV CODE 250

## 2022-07-19 PROCEDURE — 84295 ASSAY OF SERUM SODIUM: CPT

## 2022-07-19 PROCEDURE — 99233 SBSQ HOSP IP/OBS HIGH 50: CPT | Mod: ,,, | Performed by: INTERNAL MEDICINE

## 2022-07-19 PROCEDURE — 80053 COMPREHEN METABOLIC PANEL: CPT | Mod: 91 | Performed by: THORACIC SURGERY (CARDIOTHORACIC VASCULAR SURGERY)

## 2022-07-19 PROCEDURE — 93010 EKG 12-LEAD: ICD-10-PCS | Mod: ,,, | Performed by: INTERNAL MEDICINE

## 2022-07-19 PROCEDURE — 84100 ASSAY OF PHOSPHORUS: CPT | Mod: 91 | Performed by: THORACIC SURGERY (CARDIOTHORACIC VASCULAR SURGERY)

## 2022-07-19 PROCEDURE — 83615 LACTATE (LD) (LDH) ENZYME: CPT | Performed by: STUDENT IN AN ORGANIZED HEALTH CARE EDUCATION/TRAINING PROGRAM

## 2022-07-19 RX ORDER — PROPOFOL 10 MG/ML
100 VIAL (ML) INTRAVENOUS ONCE
Status: COMPLETED | OUTPATIENT
Start: 2022-07-19 | End: 2022-07-19

## 2022-07-19 RX ORDER — METOLAZONE 2.5 MG/1
10 TABLET ORAL ONCE
Status: COMPLETED | OUTPATIENT
Start: 2022-07-19 | End: 2022-07-19

## 2022-07-19 RX ORDER — DOCUSATE SODIUM 50 MG/5ML
100 LIQUID ORAL 2 TIMES DAILY
Status: DISCONTINUED | OUTPATIENT
Start: 2022-07-19 | End: 2022-07-20

## 2022-07-19 RX ORDER — POLYETHYLENE GLYCOL 3350 17 G/17G
17 POWDER, FOR SOLUTION ORAL DAILY
Status: DISCONTINUED | OUTPATIENT
Start: 2022-07-20 | End: 2022-07-21

## 2022-07-19 RX ORDER — HYDROCODONE BITARTRATE AND ACETAMINOPHEN 500; 5 MG/1; MG/1
TABLET ORAL
Status: DISCONTINUED | OUTPATIENT
Start: 2022-07-19 | End: 2022-07-20

## 2022-07-19 RX ORDER — POTASSIUM CHLORIDE 29.8 MG/ML
40 INJECTION INTRAVENOUS ONCE
Status: COMPLETED | OUTPATIENT
Start: 2022-07-19 | End: 2022-07-19

## 2022-07-19 RX ORDER — BISACODYL 10 MG
10 SUPPOSITORY, RECTAL RECTAL ONCE
Status: COMPLETED | OUTPATIENT
Start: 2022-07-19 | End: 2022-07-19

## 2022-07-19 RX ORDER — PROPOFOL 10 MG/ML
VIAL (ML) INTRAVENOUS
Status: COMPLETED
Start: 2022-07-19 | End: 2022-07-19

## 2022-07-19 RX ORDER — MIDAZOLAM HYDROCHLORIDE 1 MG/ML
INJECTION INTRAMUSCULAR; INTRAVENOUS
Status: COMPLETED
Start: 2022-07-19 | End: 2022-07-19

## 2022-07-19 RX ORDER — ALBUMIN HUMAN 50 G/1000ML
SOLUTION INTRAVENOUS
Status: COMPLETED
Start: 2022-07-19 | End: 2022-07-19

## 2022-07-19 RX ORDER — MIDAZOLAM HYDROCHLORIDE 1 MG/ML
4 INJECTION INTRAMUSCULAR; INTRAVENOUS ONCE
Status: COMPLETED | OUTPATIENT
Start: 2022-07-19 | End: 2022-07-19

## 2022-07-19 RX ORDER — SYRING-NEEDL,DISP,INSUL,0.3 ML 29 G X1/2"
296 SYRINGE, EMPTY DISPOSABLE MISCELLANEOUS ONCE
Status: COMPLETED | OUTPATIENT
Start: 2022-07-19 | End: 2022-07-19

## 2022-07-19 RX ORDER — FENTANYL CITRATE 50 UG/ML
100 INJECTION, SOLUTION INTRAMUSCULAR; INTRAVENOUS ONCE
Status: COMPLETED | OUTPATIENT
Start: 2022-07-19 | End: 2022-07-19

## 2022-07-19 RX ORDER — FENTANYL CITRATE 50 UG/ML
INJECTION, SOLUTION INTRAMUSCULAR; INTRAVENOUS
Status: COMPLETED
Start: 2022-07-19 | End: 2022-07-19

## 2022-07-19 RX ADMIN — HYDROMORPHONE HYDROCHLORIDE 1 MG: 1 INJECTION, SOLUTION INTRAMUSCULAR; INTRAVENOUS; SUBCUTANEOUS at 01:07

## 2022-07-19 RX ADMIN — HYDROMORPHONE HYDROCHLORIDE 1 MG: 1 INJECTION, SOLUTION INTRAMUSCULAR; INTRAVENOUS; SUBCUTANEOUS at 11:07

## 2022-07-19 RX ADMIN — PANTOPRAZOLE SODIUM 40 MG: 40 INJECTION, POWDER, FOR SOLUTION INTRAVENOUS at 08:07

## 2022-07-19 RX ADMIN — DEXMEDETOMIDINE HYDROCHLORIDE 1.2 MCG/KG/HR: 4 INJECTION INTRAVENOUS at 03:07

## 2022-07-19 RX ADMIN — HEPARIN SODIUM 800 UNITS/HR: 5000 INJECTION INTRAVENOUS; SUBCUTANEOUS at 05:07

## 2022-07-19 RX ADMIN — EPINEPHRINE 0.09 MCG/KG/MIN: 1 INJECTION INTRAMUSCULAR; INTRAVENOUS; SUBCUTANEOUS at 10:07

## 2022-07-19 RX ADMIN — FENTANYL CITRATE 100 MCG: 50 INJECTION, SOLUTION INTRAMUSCULAR; INTRAVENOUS at 05:07

## 2022-07-19 RX ADMIN — HYDROMORPHONE HYDROCHLORIDE 1 MG: 1 INJECTION, SOLUTION INTRAMUSCULAR; INTRAVENOUS; SUBCUTANEOUS at 06:07

## 2022-07-19 RX ADMIN — DEXMEDETOMIDINE HYDROCHLORIDE 1.2 MCG/KG/HR: 4 INJECTION INTRAVENOUS at 07:07

## 2022-07-19 RX ADMIN — MAGNESIUM CITRATE 296 ML: 1.75 LIQUID ORAL at 08:07

## 2022-07-19 RX ADMIN — METOLAZONE 10 MG: 2.5 TABLET ORAL at 08:07

## 2022-07-19 RX ADMIN — NOREPINEPHRINE BITARTRATE 0.02 MCG/KG/MIN: 8 INJECTION, SOLUTION INTRAVENOUS at 10:07

## 2022-07-19 RX ADMIN — PANTOPRAZOLE SODIUM 40 MG: 40 INJECTION, POWDER, FOR SOLUTION INTRAVENOUS at 09:07

## 2022-07-19 RX ADMIN — DOCUSATE SODIUM 100 MG: 50 LIQUID ORAL at 08:07

## 2022-07-19 RX ADMIN — BISACODYL 10 MG: 10 SUPPOSITORY RECTAL at 09:07

## 2022-07-19 RX ADMIN — DEXMEDETOMIDINE HYDROCHLORIDE 1.2 MCG/KG/HR: 4 INJECTION INTRAVENOUS at 12:07

## 2022-07-19 RX ADMIN — EPINEPHRINE 0.06 MCG/KG/MIN: 1 INJECTION INTRAMUSCULAR; INTRAVENOUS; SUBCUTANEOUS at 02:07

## 2022-07-19 RX ADMIN — DEXMEDETOMIDINE HYDROCHLORIDE 1.2 MCG/KG/HR: 4 INJECTION INTRAVENOUS at 01:07

## 2022-07-19 RX ADMIN — HYDROMORPHONE HYDROCHLORIDE 1 MG: 1 INJECTION, SOLUTION INTRAMUSCULAR; INTRAVENOUS; SUBCUTANEOUS at 05:07

## 2022-07-19 RX ADMIN — HYDROMORPHONE HYDROCHLORIDE 1 MG: 1 INJECTION, SOLUTION INTRAMUSCULAR; INTRAVENOUS; SUBCUTANEOUS at 09:07

## 2022-07-19 RX ADMIN — MIDAZOLAM 4 MG: 1 INJECTION INTRAMUSCULAR; INTRAVENOUS at 05:07

## 2022-07-19 RX ADMIN — MIDAZOLAM HYDROCHLORIDE 4 MG: 1 INJECTION INTRAMUSCULAR; INTRAVENOUS at 05:07

## 2022-07-19 RX ADMIN — DEXMEDETOMIDINE HYDROCHLORIDE 1.4 MCG/KG/HR: 4 INJECTION INTRAVENOUS at 08:07

## 2022-07-19 RX ADMIN — DEXMEDETOMIDINE HYDROCHLORIDE 1.2 MCG/KG/HR: 4 INJECTION INTRAVENOUS at 11:07

## 2022-07-19 RX ADMIN — FENTANYL CITRATE 100 MCG: 50 INJECTION INTRAMUSCULAR; INTRAVENOUS at 05:07

## 2022-07-19 RX ADMIN — POLYETHYLENE GLYCOL 3350 17 G: 17 POWDER, FOR SOLUTION ORAL at 08:07

## 2022-07-19 RX ADMIN — PROPOFOL 100 MG: 10 INJECTION, EMULSION INTRAVENOUS at 05:07

## 2022-07-19 RX ADMIN — DEXMEDETOMIDINE HYDROCHLORIDE 1 MCG/KG/HR: 4 INJECTION INTRAVENOUS at 04:07

## 2022-07-19 RX ADMIN — DEXMEDETOMIDINE HYDROCHLORIDE 1.2 MCG/KG/HR: 4 INJECTION INTRAVENOUS at 10:07

## 2022-07-19 RX ADMIN — Medication 100 MG: at 05:07

## 2022-07-19 RX ADMIN — POTASSIUM CHLORIDE 40 MEQ: 29.8 INJECTION, SOLUTION INTRAVENOUS at 02:07

## 2022-07-19 NOTE — SUBJECTIVE & OBJECTIVE
Interval History:   The patient underwent HM III upgrade on 7/13/22.  He returned from the OR intubated and on VA ECMO and has remained intubated and on VA ECMO since that time.  He underwent washout and his sternum was closed.    Currently on the following drips:  Lasix 1 mg/hr  Epi at 0.06  Vasopressin at 0.04  Heparin gtt  Precedex @ 1.2 mcg/kg/hr    Continuous Infusions:   dexmedetomidine (PRECEDEX) infusion 1.2 mcg/kg/hr (07/19/22 1500)    EPINEPHrine 0.06 mcg/kg/min (07/19/22 1500)    furosemide (LASIX) 2 mg/mL continuous infusion (non-titrating) 1 mg/hr (07/19/22 1500)    heparin (porcine) in 5 % dex 800 Units/hr (07/19/22 1500)    nitric oxide gas      NORepinephrine bitartrate-D5W Stopped (07/17/22 1647)    vasopressin 0.04 Units/min (07/19/22 1500)     Scheduled Meds:   docusate  100 mg Per NG tube BID    levothyroxine  112 mcg Per OG tube Before breakfast    pantoprazole  40 mg Intravenous BID    [START ON 7/20/2022] polyethylene glycol  17 g Per NG tube Daily     PRN Meds:sodium chloride, sodium chloride 0.9%, dextrose 10%, dextrose 10%, HYDROmorphone, HYDROmorphone    Review of patient's allergies indicates:   Allergen Reactions    Lisinopril Anaphylaxis    Hydralazine analogues      Chronic constipation, impotence, dizziness     Objective:     Vital Signs (Most Recent):  Temp: 98.06 °F (36.7 °C) (07/19/22 1545)  Pulse: 102 (07/19/22 1545)  Resp: (!) 21 (07/19/22 1501)  BP: (!) 75/0 (07/19/22 1502)  SpO2: 95 % (07/19/22 1545)   Vital Signs (24h Range):  Temp:  [97.7 °F (36.5 °C)-98.24 °F (36.8 °C)] 98.06 °F (36.7 °C)  Pulse:  [] 102  Resp:  [20-25] 21  SpO2:  [83 %-98 %] 95 %  BP: (75-80)/(0) 75/0  Arterial Line BP: (69-85)/(57-74) 78/68     Patient Vitals for the past 72 hrs (Last 3 readings):   Weight   07/19/22 0500 108.9 kg (240 lb)   07/18/22 0639 111.6 kg (246 lb)   07/17/22 0533 118.8 kg (262 lb)     Body mass index is 31.67 kg/m².      Intake/Output Summary (Last 24 hours) at 7/19/2022  1606  Last data filed at 7/19/2022 1500  Gross per 24 hour   Intake 2396.95 ml   Output 2995 ml   Net -598.05 ml       Hemodynamic Parameters:       Physical Exam  Constitutional:       Appearance: He is obese.      Comments: Intubated, sedated   HENT:      Head: Normocephalic.      Nose: Nose normal.      Mouth/Throat:      Mouth: Mucous membranes are moist.   Eyes:      General: Scleral icterus present.   Cardiovascular:      Rate and Rhythm: Normal rate and regular rhythm.      Comments: VAD hum appreciated  Pulses detected via doppler  Pulmonary:      Comments: Coarse mechanical breath sounds bilaterally  Abdominal:      General: Bowel sounds are normal.      Palpations: Abdomen is soft.   Musculoskeletal:      Cervical back: Neck supple.      Right lower leg: No edema.      Left lower leg: No edema.   Skin:     General: Skin is warm.   Neurological:      Comments: Sedated    Psychiatric:      Comments: Sedated       Significant Labs:  CBC:  Recent Labs   Lab 07/18/22  1950 07/18/22  1957 07/19/22  0353 07/19/22  0354 07/19/22  1008 07/19/22  1137 07/19/22  1138   WBC 16.01*  --  15.13*  --   --  15.62*  --    RBC 2.75*  --  2.75*  --   --  2.73*  --    HGB 7.9*  --  8.0*  --   --  8.0*  --    HCT 25.7*   < > 25.5*   < > 33* 25.8* 30*   PLT 81*  --  99*  --   --  117*  --    MCV 94  --  93  --   --  95  --    MCH 28.7  --  29.1  --   --  29.3  --    MCHC 30.7*  --  31.4*  --   --  31.0*  --     < > = values in this interval not displayed.     BNP:  Recent Labs   Lab 07/15/22  0401 07/17/22  0359   BNP 1,106* 955*     CMP:  Recent Labs   Lab 07/19/22  0114 07/19/22  0353 07/19/22  1137   * 142* 134*   CALCIUM 9.0 9.2 9.4   ALBUMIN 2.0* 2.0* 2.0*   PROT 5.5* 5.6* 5.7*   * 146* 149*   K 3.8 4.2 4.5   CO2 22* 22* 23   * 114* 118*   BUN 65* 65* 63*   CREATININE 2.5* 2.5* 2.4*   ALKPHOS 113 117 119   ALT 50* 49* 48*   AST 78* 77* 77*   BILITOT 11.7* 12.1* 12.6*      Coagulation:   Recent Labs   Lab  07/18/22  1950 07/19/22  0353 07/19/22  1137   INR 1.1 1.1 1.1   APTT 28.8 29.1 28.9     LDH:  Recent Labs   Lab 07/17/22  0649 07/18/22  0318 07/19/22  0353   * 517* 588*     Microbiology:  Microbiology Results (last 7 days)       ** No results found for the last 168 hours. **            I have reviewed all pertinent labs within the past 24 hours.    Estimated Creatinine Clearance: 45 mL/min (A) (based on SCr of 2.4 mg/dL (H)).    Diagnostic Results:  I have reviewed and interpreted all pertinent imaging results/findings within the past 24 hours.

## 2022-07-19 NOTE — ASSESSMENT & PLAN NOTE
The patient presented with COVID and found to have an uptrending LDH with increased power.  He underwent HM III upgrade on 7/13/22  -Daily LDH   -Continue Heparin drip    Procedure: Device Interrogation Including analysis of device parameters  Current Settings: Ventricular Assist Device    TXP LVAD INTERROGATIONS 7/19/2022 7/19/2022 7/19/2022 7/19/2022 7/19/2022 7/19/2022 7/19/2022   Type HeartMate3 HeartMate3 HeartMate3 HeartMate3 HeartMate3 HeartMate3 HeartMate3   Flow 4.6 4.6 4.6 4.6 4.7 4.6 4.7   Speed 5500 5500 5500 5500 5500 5500 5500   PI 2.1 2.2 2.1 2.1 2.1 2.2 2.2   Power ([place]) 4.0 3.9 3.9 3.9 4.0 3.9 4.0   LSL - - - - - - -   Pulsatility - - - - - - -

## 2022-07-19 NOTE — ASSESSMENT & PLAN NOTE
Patient experienced an episode of VT on 6/30 and experienced an ICD shock thought to be related to hypokalemia (K of 3.1 on 6/30)  - Aggressively replete K, keep K>4 and Mg>2  - Was previously on mexiletine 25mg q8hrs and amiodarone 400mg daily  - Continue to monitor on telemetry

## 2022-07-19 NOTE — SUBJECTIVE & OBJECTIVE
Interval History:   Pt remains intubated FiO2 50%, on ECMO, heparin gtt,  and on pressors Epi & vasopressin   Remains on Lasix gtt at 1mg/hr      sNa  149->151 at 0354 this morning pt was given 250mls water bolus x6     sCr 4.1-->3.3->2.5 this morning     I/O: 2.9L/2.9L urine and 370ml from chest tube drains, net -4.90mL in the last 24 hours    Review of patient's allergies indicates:   Allergen Reactions    Lisinopril Anaphylaxis    Hydralazine analogues      Chronic constipation, impotence, dizziness     Current Facility-Administered Medications   Medication Frequency    0.9%  NaCl infusion PRN    dexmedetomidine (PRECEDEX) 400mcg/100mL 0.9% NaCL infusion Continuous    dextrose 10% bolus 125 mL PRN    dextrose 10% bolus 250 mL PRN    docusate 50 mg/5 mL liquid 100 mg Daily    EPINEPHrine (ADRENALIN) 10 mg in dextrose 5 % 250 mL infusion Continuous    furosemide (LASIX) 200 mg in dextrose 5 % 100 mL continuous infusion (conc: 2 mg/mL) Continuous    heparin 25,000 units in dextrose 5% 250 mL (100 units/mL) infusion (heparin infusion - NO NOMOGRAM) Continuous    HYDROmorphone injection 0.5 mg Q4H PRN    HYDROmorphone injection 1 mg Q4H PRN    levothyroxine tablet 112 mcg Before breakfast    nitric oxide gas Gas 5 ppm Continuous    NORepinephrine 8 mg in dextrose 5% 250 mL infusion Continuous    pantoprazole injection 40 mg BID    polyethylene glycol packet 17 g Daily    vasopressin (PITRESSIN) 1 Units/mL in dextrose 5 % 100 mL infusion Continuous       Objective:     Vital Signs (Most Recent):  Temp: 97.88 °F (36.6 °C) (07/19/22 0600)  Pulse: 95 (07/19/22 0600)  Resp: (!) 22 (07/19/22 0600)  BP: (!) 80/0 (07/19/22 0300)  SpO2: 98 % (07/19/22 0405)  O2 Device (Oxygen Therapy): ventilator (07/19/22 0600)   Vital Signs (24h Range):  Temp:  [97.7 °F (36.5 °C)-98.06 °F (36.7 °C)] 97.88 °F (36.6 °C)  Pulse:  [] 95  Resp:  [22-23] 22  SpO2:  [96 %-98 %] 98 %  BP: (80-84)/(0) 80/0  Arterial Line BP: (69-98)/(44-73)  81/68     Weight: 108.9 kg (240 lb) (07/19/22 0500)  Body mass index is 31.67 kg/m².  Body surface area is 2.37 meters squared.    I/O last 3 completed shifts:  In: 3921.8 [I.V.:2214.7; NG/GT:1390; IV Piggyback:317.1]  Out: 9129 [Urine:8549; Chest Tube:580]    Physical Exam  Vitals and nursing note reviewed.   Constitutional:       General: He is in acute distress.      Appearance: He is ill-appearing. He is not toxic-appearing or diaphoretic.   HENT:      Mouth/Throat:      Mouth: Mucous membranes are moist.   Eyes:      General: No scleral icterus.  Cardiovascular:      Rate and Rhythm: Normal rate and regular rhythm.      Pulses: Normal pulses.      Heart sounds: Murmur heard.      Comments: Intubated   Ecmo lines   + R IJ Trialysis catheter   Pulmonary:      Effort: Pulmonary effort is normal. No respiratory distress.      Breath sounds: Normal breath sounds. No stridor. No wheezing, rhonchi or rales.      Comments: intubated  Abdominal:      General: There is no distension.      Palpations: There is no mass.   Genitourinary:     Comments: Dark yellow colored urine in sun collection bag   Musculoskeletal:         General: Swelling present. No tenderness, deformity or signs of injury.      Right lower leg: Edema present.      Left lower leg: Edema present.   Skin:     General: Skin is warm.      Capillary Refill: Capillary refill takes 2 to 3 seconds.      Coloration: Skin is pale. Skin is not jaundiced.      Findings: No bruising, erythema, lesion or rash.       Significant Labs:  ABGs:   Recent Labs   Lab 07/19/22 0354   PH 7.404   PCO2 39.0   HCO3 24.4   POCSATURATED 97   BE 0       CBC:   Recent Labs   Lab 07/19/22 0353 07/19/22 0354   WBC 15.13*  --    RBC 2.75*  --    HGB 8.0*  --    HCT 25.5* 23*   PLT 99*  --    MCV 93  --    MCH 29.1  --    MCHC 31.4*  --        CMP:   Recent Labs   Lab 07/19/22 0353   *   CALCIUM 9.2   ALBUMIN 2.0*   PROT 5.6*   *   K 4.2   CO2 22*   *   BUN 65*    CREATININE 2.5*   ALKPHOS 117   ALT 49*   AST 77*   BILITOT 12.1*       LFTs:   Recent Labs   Lab 07/19/22  0353   ALT 49*   AST 77*   ALKPHOS 117   BILITOT 12.1*   PROT 5.6*   ALBUMIN 2.0*       All labs within the past 24 hours have been reviewed.     Significant Imaging:  Labs: Reviewed  X-Ray: Reviewed

## 2022-07-19 NOTE — ASSESSMENT & PLAN NOTE
Last sNa at 0354 was 151  Likely d/t diuretics and lack of free water  Calculated free water deficit is 5L    Recommendations:  -would hold diuretics and provide at least scheduled free water flushes of 400ml Q6H x24 hours

## 2022-07-19 NOTE — PROGRESS NOTES
"[facility]  Nephrology  Progress Note    Patient Name: Tim Richards  MRN: [id number]  Admission Date: 6/27/2022  Hospital Length of Stay: 22 days  Attending Provider: Yg Kaufman MD   Primary Care Physician: Deyanira Booth MD  Principal Problem:Left ventricular assist device (LVAD) complication    Subjective:     HPI:   Tim Richards is a 56 y.o. male w/ Known CKD 3b (without prior nephrology f/u), NICM, end-stage HF, EF 10% s/p HM2 LVAD (2018) and ICD placement (2014), ventricular fibrillation (2020), GI bleed (2019), hypothyroidism, alcohol use (2014), nicotine dependence, and [facility] amitted on 6/27/2022 for evaluation and management of decreased appetite, decreased and dark-colored urine, and increasing shortness of breath.     History obtained from EMR and family at bedside.    In the ED, pt presented with the following VS:   Initial Vitals   BP Pulse Resp Temp SpO2   06/27/22 1129 06/27/22 1038 06/27/22 1038 06/27/22 1038 06/27/22 1810   (!) 94/0 93 20 98 °F (36.7 °C) 95 %      MAP       --                Pt was found to be in acute decompensated heart failure, hypoxic, and severely volume overloaded, for which he was started on BIPAP and on lasix gtt. On 6/30 pt had ventricular tachycardia with ICD shock, this was believed to be 2/2 hypokalemia.   His LVAD was interrogated and there was a concern of LVAD thrombosis and pt was started on Integrilin,however whilke on medical management pt continue with worsening hemolysis and so CTS was consulted and recommended upgrading to HM3 and on 7/13 pt underwent HM 2 explantation, and implantation of HM 3 LVAD. Intraoperatively pt had significant vasoplegic shock and pt was placed on ECMO as well as on vasopressors and steroids for vasodilatory shock. Pt remained intubated postoperatively.   Of note, on admission pt was found to be COVID 19 + and was treated with Remdesivir.     Nephrology was consulted for: "may soon have indication " "for dialysis"  Baseline sCr: 1.9-2.3  On admission his Cr was: 2.5    On 7/13 (day of surgery) his I/Os were as follows: 20L/4.4L, net +15.6L, urine 1965mls and 2.5L from chest tube    On 7/14 to 7/15 his I/Os were: 6.4/5.5L,net 866mls, 1.6L chest tube and 4L urine         Interval History:   Pt remains intubated FiO2 50%, on ECMO, heparin gtt,  and on pressors Epi & vasopressin   Remains on Lasix gtt at 1mg/hr      sNa  149->151 at 0354 this morning pt was given 250mls water bolus x6     sCr 4.1-->3.3->2.5 this morning     I/O: 2.9L/2.9L urine and 370ml from chest tube drains, net -490mL in the last 24 hours    Review of patient's allergies indicates:   Allergen Reactions    Lisinopril Anaphylaxis    Hydralazine analogues      Chronic constipation, impotence, dizziness     Current Facility-Administered Medications   Medication Frequency    0.9%  NaCl infusion PRN    dexmedetomidine (PRECEDEX) 400mcg/100mL 0.9% NaCL infusion Continuous    dextrose 10% bolus 125 mL PRN    dextrose 10% bolus 250 mL PRN    docusate 50 mg/5 mL liquid 100 mg Daily    EPINEPHrine (ADRENALIN) 10 mg in dextrose 5 % 250 mL infusion Continuous    furosemide (LASIX) 200 mg in dextrose 5 % 100 mL continuous infusion (conc: 2 mg/mL) Continuous    heparin 25,000 units in dextrose 5% 250 mL (100 units/mL) infusion (heparin infusion - NO NOMOGRAM) Continuous    HYDROmorphone injection 0.5 mg Q4H PRN    HYDROmorphone injection 1 mg Q4H PRN    levothyroxine tablet 112 mcg Before breakfast    nitric oxide gas Gas 5 ppm Continuous    NORepinephrine 8 mg in dextrose 5% 250 mL infusion Continuous    pantoprazole injection 40 mg BID    polyethylene glycol packet 17 g Daily    vasopressin (PITRESSIN) 1 Units/mL in dextrose 5 % 100 mL infusion Continuous       Objective:     Vital Signs (Most Recent):  Temp: 97.88 °F (36.6 °C) (07/19/22 0600)  Pulse: 95 (07/19/22 0600)  Resp: (!) 22 (07/19/22 0600)  BP: (!) 80/0 (07/19/22 0300)  SpO2: 98 % " (07/19/22 0405)  O2 Device (Oxygen Therapy): ventilator (07/19/22 0600)   Vital Signs (24h Range):  Temp:  [97.7 °F (36.5 °C)-98.06 °F (36.7 °C)] 97.88 °F (36.6 °C)  Pulse:  [] 95  Resp:  [22-23] 22  SpO2:  [96 %-98 %] 98 %  BP: (80-84)/(0) 80/0  Arterial Line BP: (69-98)/(44-73) 81/68     Weight: 108.9 kg (240 lb) (07/19/22 0500)  Body mass index is 31.67 kg/m².  Body surface area is 2.37 meters squared.    I/O last 3 completed shifts:  In: 3921.8 [I.V.:2214.7; NG/GT:1390; IV Piggyback:317.1]  Out: 9129 [Urine:8549; Chest Tube:580]    Physical Exam  Vitals and nursing note reviewed.   Constitutional:       General: He is in acute distress.      Appearance: He is ill-appearing. He is not toxic-appearing or diaphoretic.   HENT:      Mouth/Throat:      Mouth: Mucous membranes are moist.   Eyes:      General: No scleral icterus.  Cardiovascular:      Rate and Rhythm: Normal rate and regular rhythm.      Pulses: Normal pulses.      Heart sounds: Murmur heard.      Comments: Intubated   Ecmo lines   + R IJ Trialysis catheter   Pulmonary:      Effort: Pulmonary effort is normal. No respiratory distress.      Breath sounds: Normal breath sounds. No stridor. No wheezing, rhonchi or rales.      Comments: intubated  Abdominal:      General: There is no distension.      Palpations: There is no mass.   Genitourinary:     Comments: Dark yellow colored urine in sun collection bag   Musculoskeletal:         General: Swelling present. No tenderness, deformity or signs of injury.      Right lower leg: Edema present.      Left lower leg: Edema present.   Skin:     General: Skin is warm.      Capillary Refill: Capillary refill takes 2 to 3 seconds.      Coloration: Skin is pale. Skin is not jaundiced.      Findings: No bruising, erythema, lesion or rash.       Significant Labs:  ABGs:   Recent Labs   Lab 07/19/22  0354   PH 7.404   PCO2 39.0   HCO3 24.4   POCSATURATED 97   BE 0       CBC:   Recent Labs   Lab 07/19/22  0359  07/19/22  0354   WBC 15.13*  --    RBC 2.75*  --    HGB 8.0*  --    HCT 25.5* 23*   PLT 99*  --    MCV 93  --    MCH 29.1  --    MCHC 31.4*  --        CMP:   Recent Labs   Lab 07/19/22  0353   *   CALCIUM 9.2   ALBUMIN 2.0*   PROT 5.6*   *   K 4.2   CO2 22*   *   BUN 65*   CREATININE 2.5*   ALKPHOS 117   ALT 49*   AST 77*   BILITOT 12.1*       LFTs:   Recent Labs   Lab 07/19/22  0353   ALT 49*   AST 77*   ALKPHOS 117   BILITOT 12.1*   PROT 5.6*   ALBUMIN 2.0*       All labs within the past 24 hours have been reviewed.     Significant Imaging:  Labs: Reviewed  X-Ray: Reviewed    Assessment/Plan:     WAGNER (acute kidney injury)  -Ischemic ATN 2/2 decrease perfusion severe hypotension in a patient with known CKD 3b  Baseline sCr: 1.9-2.3  On admission his Cr was: 2.5  Lab Results   Component Value Date    CREATININE 2.5 (H) 07/19/2022     7/15 urine microscopy showed: many granular casts, muddy brown casts, waxy casts, some WBCS, some yeast, and occasional RBCs    Recommendations:   -would hold diuretics today, sNa 151, see hyperNa  -Electrolytes: K 4.2, renal diet, page nephrology if K >5.5    Phos level 3.3, DC phos binders    Mg 2.7, goal >2  -Acid/base: AGMA 2/2 ATN  -Fluid balance: net ->4 in the last 48 hrs, pt is 3rd spacing as his albumin is 2   -Anemia: Hgb 8.2, send anemia panel labs, transfuse for Hgb <7.0  -Strict I/O's and daily weights  -Renal function panel and Mg levels Q8H   -Renal ultrasound reviewed: no hydronephrosis and minimal CKD changes  -Maintain MAP >65 for renal perfusion    There is no immediate indication for KRT at this time      Hypernatremia  Last sNa at 0354 was 151  Likely d/t diuretics and lack of free water  Calculated free water deficit is 5L    Recommendations:  -would hold diuretics and provide at least scheduled free water flushes of 400ml Q6H x24 hours       Dilated cardiomyopathy  S/p ICD placement and 7/13 pt underwent HM 2 explantation, and implantation of  HM 3 LVAD.  -Plan per primary team       Leukocytosis  Lab Results   Component Value Date    WBC 15.13 (H) 07/19/2022     -Plan per primary team       LVAD (left ventricular assist device) present  -Plan per primary team           Thank you for your consult. I will follow-up with patient. Please contact us if you have any additional questions.    Holly Spangler MD  Nephrology  [facility]

## 2022-07-19 NOTE — PROGRESS NOTES
Interdisciplinary Rounds Report:   Attended interdisciplinary rounds with the [facility]/CTS services including the LVAD Coordinators, social workers, cardiologists, surgeons,  PT/OT/Speech, dietician, and unit charge nurses. Discussed patient status, plan of care, goals of care, including DC date, and post discharge needs. Plan of care will be discussed with the patient and/or family per the physician while rounding on the floor. This is a recurring meeting that is medically and socially necessary to collaborate with the interdisciplinary team to assist patient needs and safe discharge.

## 2022-07-19 NOTE — PROGRESS NOTES
..ECMO Specialists shift report    Date: 07/19/2022  ECMO Specialist:  Pippa Horan    Pump parameters:  RPM: 3400  Flow:  2.6  Sweep:  2L   FiO2:  65%    Oxygenator status:  Clots: pre/post oxy  Fibrin: pre/post oxy    Pressure trends:  P1: 201  P2: 185  Delta P: 16-17  Negative:      Volume status:  Chugging noted  CVP: 6-7  MAP:  70's  MD notified (name):      Anticoagulation:  APTT parameters: 40-60  aPTT trends this shift: 29    Cannula size / status / placement:  Return cannula / from circuit to patient: RFA 18 FR 9.5  Drainage cannula / from patient to circuit: RFV 2r FR 5.5       Additional Comments:  Weaned to goal of 2L/ 65% overnight, to decannulate today.  Pt did have short runs of vtac overnight, associated with coughing.  Tube feeds are stopped in preparation for today.  Heparin at 800 units.  continued to monitor clots/ fibrin throughout the night.

## 2022-07-19 NOTE — SUBJECTIVE & OBJECTIVE
Interval History/Significant Events: No acute events overnight. Weaned ECMO to 3400 rpm yesterday. FiO2 on the vent weaned to 50%. FiO2 on ECMO weaned to 65%. HDS on epi 0.06 and vaso 0.02. Planning for decannulation today.    Follow-up For: Procedure(s) (LRB):  CLOSURE, WOUND, STERNUM (N/A)  APPLICATION, WOUND VAC (N/A)  INSERTION, GRAFT, PERICARDIUM (N/A)  IRRIGATION, MEDIASTINUM (N/A)    Post-Operative Day: 4 Days Post-Op    Objective:     Vital Signs (Most Recent):  Temp: 97.88 °F (36.6 °C) (07/19/22 0600)  Pulse: 95 (07/19/22 0600)  Resp: (!) 22 (07/19/22 0600)  BP: (!) 80/0 (07/19/22 0300)  SpO2: 98 % (07/19/22 0405)   Vital Signs (24h Range):  Temp:  [97.7 °F (36.5 °C)-98.06 °F (36.7 °C)] 97.88 °F (36.6 °C)  Pulse:  [] 95  Resp:  [22-23] 22  SpO2:  [96 %-98 %] 98 %  BP: (80-84)/(0) 80/0  Arterial Line BP: (69-98)/(44-73) 81/68     Weight: 108.9 kg (240 lb)  Body mass index is 31.67 kg/m².      Intake/Output Summary (Last 24 hours) at 7/19/2022 0656  Last data filed at 7/19/2022 0600  Gross per 24 hour   Intake 2854.73 ml   Output 3345 ml   Net -490.27 ml       Physical Exam  Constitutional:       Comments: Intubated and sedated   HENT:      Head: Normocephalic and atraumatic.      Mouth/Throat:      Comments: OG in place  Eyes:      Pupils: Pupils are equal, round, and reactive to light.   Neck:      Comments: L IJ CVC  R IJ trialysis  Cardiovascular:      Rate and Rhythm: Regular rhythm. Tachycardia present.      Comments: On V-A ECMO -- 4000 rpm, 3.5L, sweep 4, FiO2 100% -- clots present on both sides of oxygenator  LVAD in place -- 5500 rpm, 4.0L    Midline sternotomy c/d with dressing in place    2 plerual and 2 mediastinal chest tubes to suction.    V pacing wires in place.   Pulmonary:      Comments: Mechanically ventilated  Abdominal:      General: There is no distension.      Palpations: Abdomen is soft.   Genitourinary:     Comments: Lagunas in place draining clear urine  Musculoskeletal:       Comments: ECMO cannula x2 (right femoral artery, right femoral vein)    L brachial, right radial arterial line   Skin:     General: Skin is warm and dry.   Neurological:      Comments: Following commands when sedation paused       Vents:  Vent Mode: A/C (07/19/22 0405)  Ventilator Initiated: Yes (07/15/22 1027)  Set Rate: 14 BPM (07/19/22 0405)  Vt Set: 400 mL (07/19/22 0405)  PEEP/CPAP: 7 cmH20 (07/19/22 0405)  Oxygen Concentration (%): 50 (07/19/22 0600)  Peak Airway Pressure: 23 cmH2O (07/19/22 0405)  Plateau Pressure: 21 cmH20 (07/19/22 0405)  Total Ve: 9.06 mL (07/19/22 0405)  Negative Inspiratory Force (cm H2O): 0 (07/19/22 0405)  F/VT Ratio<105 (RSBI): (!) 54.86 (07/19/22 0405)    Lines/Drains/Airways       Central Venous Catheter Line  Duration                  ECMO Cannula 07/13/22 right femoral artery 6 days         ECMO Cannula 07/13/22 right femoral vein 6 days     Introducer with Double Lumen 07/13/22 0900 left internal jugular 5 days    Percutaneous Central Line Insertion/Assessment - Quad Lumen  07/13/22 0941 left internal jugular 5 days    Trialysis (Dialysis) Catheter 07/13/22 2100 right internal jugular 5 days              Drain  Duration                  Chest Tube 07/13/22 1916 1 Right Pleural 32 Fr. 5 days         Chest Tube 07/13/22 1916 2 Left Pleural 32 Fr. 5 days         Chest Tube 07/13/22 1916 3 Anterior Mediastinal 32 Fr. 5 days         Chest Tube 07/13/22 1916 4 Posterior Mediastinal 32 Fr. 5 days         Urethral Catheter 07/13/22 0848 Temperature probe;Latex 14 Fr. 5 days         NG/OG Tube 07/15/22 1030 Center mouth 3 days              Airway  Duration                  Airway - Non-Surgical 07/13/22 0848 5 days              Arterial Line  Duration             Arterial Line 07/13/22 0932 Left Brachial 5 days    Arterial Line 07/13/22 2000 Right Radial 5 days              Line  Duration                  VAD 03/08/18 1124 Left ventricular assist device HeartMate II 1593 days         VAD  07/13/22 1300 Left ventricular assist device HeartMate 3 5 days              Peripheral Intravenous Line  Duration                  Midline Catheter Insertion/Assessment  - Single Lumen 06/28/22 1027 Right cephalic vein (lateral side of arm) 20g x 8cm 20 days                    Significant Labs:    CBC/Anemia Profile:  Recent Labs   Lab 07/18/22  1237 07/18/22  1607 07/18/22  1950 07/18/22  1957 07/18/22  2348 07/19/22  0353 07/19/22  0354   WBC 17.06*  --  16.01*  --   --  15.13*  --    HGB 8.1*  --  7.9*  --   --  8.0*  --    HCT 25.3*   < > 25.7*   < > 25* 25.5* 23*   PLT 95*  --  81*  --   --  99*  --    MCV 89  --  94  --   --  93  --    RDW 16.3*  --  16.7*  --   --  17.1*  --     < > = values in this interval not displayed.        Chemistries:  Recent Labs   Lab 07/18/22  1950 07/19/22  0114 07/19/22  0353   * 147* 146*   K 3.8 3.8 4.2   * 114* 114*   CO2 24 22* 22*   BUN 69* 65* 65*   CREATININE 2.7* 2.5* 2.5*   CALCIUM 9.2 9.0 9.2   ALBUMIN 2.0* 2.0* 2.0*   PROT 5.5* 5.5* 5.6*   BILITOT 11.7* 11.7* 12.1*   ALKPHOS 113 113 117   ALT 51* 50* 49*   AST 85* 78* 77*   MG 2.7* 2.7* 2.7*   PHOS 3.8 3.4 3.3       ABGs:   Recent Labs   Lab 07/19/22  0354   PH 7.404   PCO2 39.0   HCO3 24.4   POCSATURATED 97   BE 0     Coagulation:   Recent Labs   Lab 07/19/22  0353   INR 1.1   APTT 29.1     Lactic Acid: No results for input(s): LACTATE in the last 48 hours.  All pertinent labs within the past 24 hours have been reviewed.    Significant Imaging:  I have reviewed all pertinent imaging results/findings within the past 24 hours.

## 2022-07-19 NOTE — PROGRESS NOTES
07/19/2022  Mejia CARLOS Shane Jr    Current provider:  Yg Kaufman MD    Device interrogation:  TXP LVAD INTERROGATIONS 7/19/2022 7/19/2022 7/19/2022 7/19/2022 7/19/2022 7/19/2022 7/19/2022   Type HeartMate3 HeartMate3 HeartMate3 HeartMate3 HeartMate3 HeartMate3 HeartMate3   Flow 4.6 4.6 4.6 4.7 4.7 4.6 4.6   Speed 5500 5500 5500 5500 5500 5500 5500   PI 2.3 2.3 2.1 2 2 2.3 2.3   Power (Jackson) 4.0 3.9 3.9 4 4 4 3.9   LSL 5100 5100 5100 5100 5100 5100 5100   Pulsatility - - - - - Intermittent pulse -          Rounded on Tim Richards to ensure all mechanical assist device settings (IABP or VAD) were appropriate and all parameters were within limits.  I was able to ensure all back up equipment was present, the staff had no issues, and the Perfusion Department daily rounding was complete.  Plan to decannulate ECMO later today possibly.    For implantable VADs: Interrogation of Ventricular assist device was performed with analysis of device parameters and review of device function. I have personally reviewed the interrogation findings and agree with findings as stated.     In emergency, the nursing units have been notified to contact the perfusion department either by:  Calling [id number] from 630am to 4pm Mon thru Fri, utilizing the On-Call Finder functionality of Epic and searching for Perfusion, or by contacting the hospital  from 4pm to 630am and on weekends and asking to speak with the perfusionist on call.    10:36 AM

## 2022-07-19 NOTE — ASSESSMENT & PLAN NOTE
Tim Richards is a 56 y.o. male HFrEF with an EF of 10% and a history of HM2 LVAD in 2018 who is now s/p HM3 upgrade, initiation of V-A ECMO after failure to wean off bypass x2      Neuro/Psych:   -- Sedation: Precedex.  -- Pain: PRN Dilaudid             Cards:   -- S/P LVAD exchange on 7/14/22  --Improving pressor requirements, consider weaning vaso   Epi 0.06   Levo off   Vaso 0.02   Dopamine off   Giapreza off  -- Stress dose steroids complete  -- Ventricular pacing wires.  -- MAP goal 75  -- VAD and ECMO flows stable, planning to decannulate today  -- Sternal closure on 7/15      Pulm:   -- Goal O2 sat > 90%  -- ABG PRN  -- Mediastinal chest tubes x2 and pleural chest tube x2 to suction  -- Nitric at 5 ppm      Renal:  -- Keep sun for strict I/O  -- Trend BUN/Cr 65/2.5 <-- 75/3.3 <-- 77/4.1 <--55/4.2 <-- 31/3.5 <-- 20/2.4  -- Lasix drip at 1        FEN / GI:   -- Replace lytes as needed  -- Nutrition: NPO, trickle TF  -- GI ppx: PPI  -- Bowel reg: miralax, docusate, mag citrate       ID:   -- Tm: afebrile; WBC stable  -- ABX complete      Heme/Onc:   -- H/H stable   -- Daily CBC  -- Received 18 pRBCs, 16 FFP, 2 plt, 25 cryo; 1500 albumin intra-op  -- No product requirement since sternal closure  -- Heparin gtt at 800      Endo:   -- BG goal 140-180  -- Insulin gtt  -- Levothyroxine      PPx:   Feeding: NPO, trickle TF  Analgesia/Sedation: Precedex / PRN Dilaudid  Thromboembolic prevention: SCDs, heparin gtt  HOB >30: Yes  Stress Ulcer ppx: PPI  Glucose control: Critical care goal 140-180 g/dl, ISS     Lines/Drains/Airway: ETT; OG; ECMO canula x2; RIJ trialysis, LIJ CVC and gertrude, r-radial a-line, Chest tube x4; sun; WV, VAD; midline      Dispo/Code Status/Palliative:   -- SICU / Full Code.

## 2022-07-19 NOTE — PLAN OF CARE
"      SICU PLAN OF CARE NOTE    Dx: Left ventricular assist device (LVAD) complication    Goals of Care: MAP 70-80    Neuro: Arouses to Voice, Follows Commands, and Moves All Extremities    Vital Signs: BP (!) 80/0 (BP Location: Left arm, Patient Position: Lying)   Pulse 95   Temp 97.88 °F (36.6 °C) (Core Bladder)   Resp (!) 22   Ht 6' 0.99" (1.854 m)   Wt 108.9 kg (240 lb)   SpO2 98%   BMI 31.67 kg/m²     VA ECMO: FiO2 65% FiO2, Sweep 2    Respiratory: Ventilator    NO @ 5ppm    Diet: NPO @ 0000    Gtts: Precedex, Vasopressin, Epinephrine, Lasix, and Heparin    Urine Output: Urinary Catheter 100-125 cc/hour    Drains:   Right pleural chest tube, 0cc/shift  Left pleural chest tube, 40cc/shift serosangunious  Ant med chest tube, 60cc/shift serosanguinous  Post med chest tube, 40cc/shift serosanguinous    Shift Events: ECMO sweep decreased. Potassium replacements administered as ordered.  Labs Q6H.  Plan of care reviewed with patient.  Reassurance provided.         "

## 2022-07-19 NOTE — ASSESSMENT & PLAN NOTE
#ADHF  #NICMP  Underwent HM III upgrade on 7/13/22, currently on VA ECMO  Currently on Lasix gtt  Currently on Epi gtt  Currently on Vasopressin gtt  Currently on Heparin gtt  Currently on Precedex gtt  Plan for decanulation today  Currently intubated and sedated

## 2022-07-19 NOTE — PROGRESS NOTES
[facility]  Critical Care - Surgery  Progress Note    Patient Name: Tim Richards  MRN: [id number]  Admission Date: 6/27/2022  Hospital Length of Stay: 22 days  Code Status: Full Code  Attending Provider: Yg Kaufman MD  Primary Care Provider: Deyanira Booth MD   Principal Problem: Left ventricular assist device (LVAD) complication    Subjective:     Hospital/ICU Course:  No notes on file    Interval History/Significant Events: No acute events overnight. Weaned ECMO to 3400 rpm yesterday. FiO2 on the vent weaned to 50%. FiO2 on ECMO weaned to 65%. HDS on epi 0.06 and vaso 0.02. Planning for decannulation today.    Follow-up For: Procedure(s) (LRB):  CLOSURE, WOUND, STERNUM (N/A)  APPLICATION, WOUND VAC (N/A)  INSERTION, GRAFT, PERICARDIUM (N/A)  IRRIGATION, MEDIASTINUM (N/A)    Post-Operative Day: 4 Days Post-Op    Objective:     Vital Signs (Most Recent):  Temp: 97.88 °F (36.6 °C) (07/19/22 0600)  Pulse: 95 (07/19/22 0600)  Resp: (!) 22 (07/19/22 0600)  BP: (!) 80/0 (07/19/22 0300)  SpO2: 98 % (07/19/22 0405)   Vital Signs (24h Range):  Temp:  [97.7 °F (36.5 °C)-98.06 °F (36.7 °C)] 97.88 °F (36.6 °C)  Pulse:  [] 95  Resp:  [22-23] 22  SpO2:  [96 %-98 %] 98 %  BP: (80-84)/(0) 80/0  Arterial Line BP: (69-98)/(44-73) 81/68     Weight: 108.9 kg (240 lb)  Body mass index is 31.67 kg/m².      Intake/Output Summary (Last 24 hours) at 7/19/2022 0656  Last data filed at 7/19/2022 0600  Gross per 24 hour   Intake 2854.73 ml   Output 3345 ml   Net -490.27 ml       Physical Exam  Constitutional:       Comments: Intubated and sedated   HENT:      Head: Normocephalic and atraumatic.      Mouth/Throat:      Comments: OG in place  Eyes:      Pupils: Pupils are equal, round, and reactive to light.   Neck:      Comments: L IJ CVC  R IJ trialysis  Cardiovascular:      Rate and Rhythm: Regular rhythm. Tachycardia present.      Comments: On V-A ECMO -- 4000 rpm, 3.5L, sweep 4, FiO2 100% -- clots  present on both sides of oxygenator  LVAD in place -- 5500 rpm, 4.0L    Midline sternotomy c/d with dressing in place    2 plerual and 2 mediastinal chest tubes to suction.    V pacing wires in place.   Pulmonary:      Comments: Mechanically ventilated  Abdominal:      General: There is no distension.      Palpations: Abdomen is soft.   Genitourinary:     Comments: Lagunas in place draining clear urine  Musculoskeletal:      Comments: ECMO cannula x2 (right femoral artery, right femoral vein)    L brachial, right radial arterial line   Skin:     General: Skin is warm and dry.   Neurological:      Comments: Following commands when sedation paused       Vents:  Vent Mode: A/C (07/19/22 0405)  Ventilator Initiated: Yes (07/15/22 1027)  Set Rate: 14 BPM (07/19/22 0405)  Vt Set: 400 mL (07/19/22 0405)  PEEP/CPAP: 7 cmH20 (07/19/22 0405)  Oxygen Concentration (%): 50 (07/19/22 0600)  Peak Airway Pressure: 23 cmH2O (07/19/22 0405)  Plateau Pressure: 21 cmH20 (07/19/22 0405)  Total Ve: 9.06 mL (07/19/22 0405)  Negative Inspiratory Force (cm H2O): 0 (07/19/22 0405)  F/VT Ratio<105 (RSBI): (!) 54.86 (07/19/22 0405)    Lines/Drains/Airways       Central Venous Catheter Line  Duration                  ECMO Cannula 07/13/22 right femoral artery 6 days         ECMO Cannula 07/13/22 right femoral vein 6 days     Introducer with Double Lumen 07/13/22 0900 left internal jugular 5 days    Percutaneous Central Line Insertion/Assessment - Quad Lumen  07/13/22 0941 left internal jugular 5 days    Trialysis (Dialysis) Catheter 07/13/22 2100 right internal jugular 5 days              Drain  Duration                  Chest Tube 07/13/22 1916 1 Right Pleural 32 Fr. 5 days         Chest Tube 07/13/22 1916 2 Left Pleural 32 Fr. 5 days         Chest Tube 07/13/22 1916 3 Anterior Mediastinal 32 Fr. 5 days         Chest Tube 07/13/22 1916 4 Posterior Mediastinal 32 Fr. 5 days         Urethral Catheter 07/13/22 0848 Temperature probe;Latex 14 Fr. 5  days         NG/OG Tube 07/15/22 1030 Center mouth 3 days              Airway  Duration                  Airway - Non-Surgical 07/13/22 0848 5 days              Arterial Line  Duration             Arterial Line 07/13/22 0932 Left Brachial 5 days    Arterial Line 07/13/22 2000 Right Radial 5 days              Line  Duration                  VAD 03/08/18 1124 Left ventricular assist device HeartMate II 1593 days         VAD 07/13/22 1300 Left ventricular assist device HeartMate 3 5 days              Peripheral Intravenous Line  Duration                  Midline Catheter Insertion/Assessment  - Single Lumen 06/28/22 1027 Right cephalic vein (lateral side of arm) 20g x 8cm 20 days                    Significant Labs:    CBC/Anemia Profile:  Recent Labs   Lab 07/18/22  1237 07/18/22  1607 07/18/22  1950 07/18/22  1957 07/18/22  2348 07/19/22  0353 07/19/22  0354   WBC 17.06*  --  16.01*  --   --  15.13*  --    HGB 8.1*  --  7.9*  --   --  8.0*  --    HCT 25.3*   < > 25.7*   < > 25* 25.5* 23*   PLT 95*  --  81*  --   --  99*  --    MCV 89  --  94  --   --  93  --    RDW 16.3*  --  16.7*  --   --  17.1*  --     < > = values in this interval not displayed.        Chemistries:  Recent Labs   Lab 07/18/22  1950 07/19/22  0114 07/19/22  0353   * 147* 146*   K 3.8 3.8 4.2   * 114* 114*   CO2 24 22* 22*   BUN 69* 65* 65*   CREATININE 2.7* 2.5* 2.5*   CALCIUM 9.2 9.0 9.2   ALBUMIN 2.0* 2.0* 2.0*   PROT 5.5* 5.5* 5.6*   BILITOT 11.7* 11.7* 12.1*   ALKPHOS 113 113 117   ALT 51* 50* 49*   AST 85* 78* 77*   MG 2.7* 2.7* 2.7*   PHOS 3.8 3.4 3.3       ABGs:   Recent Labs   Lab 07/19/22  0354   PH 7.404   PCO2 39.0   HCO3 24.4   POCSATURATED 97   BE 0     Coagulation:   Recent Labs   Lab 07/19/22  0353   INR 1.1   APTT 29.1     Lactic Acid: No results for input(s): LACTATE in the last 48 hours.  All pertinent labs within the past 24 hours have been reviewed.    Significant Imaging:  I have reviewed all pertinent imaging  results/findings within the past 24 hours.    Assessment/Plan:     Chronic combined systolic and diastolic heart failure  Tim Richards is a 56 y.o. male HFrEF with an EF of 10% and a history of HM2 LVAD in 2018 who is now s/p HM3 upgrade, initiation of V-A ECMO after failure to wean off bypass x2      Neuro/Psych:   -- Sedation: Precedex.  -- Pain: PRN Dilaudid             Cards:   -- S/P LVAD exchange on 7/14/22  --Improving pressor requirements, consider weaning vaso   Epi 0.06   Levo off   Vaso 0.02   Dopamine off   Giapreza off  -- Stress dose steroids complete  -- Ventricular pacing wires.  -- MAP goal 75  -- VAD and ECMO flows stable, planning to decannulate today  -- Sternal closure on 7/15      Pulm:   -- Goal O2 sat > 90%  -- ABG PRN  -- Mediastinal chest tubes x2 and pleural chest tube x2 to suction  -- Nitric at 5 ppm      Renal:  -- Keep sun for strict I/O  -- Trend BUN/Cr 65/2.5 <-- 75/3.3 <-- 77/4.1 <--55/4.2 <-- 31/3.5 <-- 20/2.4  -- Lasix drip at 1        FEN / GI:   -- Replace lytes as needed  -- Nutrition: NPO, trickle TF  -- GI ppx: PPI  -- Bowel reg: miralax, docusate, mag citrate       ID:   -- Tm: afebrile; WBC stable  -- ABX complete      Heme/Onc:   -- H/H stable   -- Daily CBC  -- Received 18 pRBCs, 16 FFP, 2 plt, 25 cryo; 1500 albumin intra-op  -- No product requirement since sternal closure  -- Heparin gtt at 800      Endo:   -- BG goal 140-180  -- Insulin gtt  -- Levothyroxine      PPx:   Feeding: NPO, trickle TF  Analgesia/Sedation: Precedex / PRN Dilaudid  Thromboembolic prevention: SCDs, heparin gtt  HOB >30: Yes  Stress Ulcer ppx: PPI  Glucose control: Critical care goal 140-180 g/dl, ISS     Lines/Drains/Airway: ETT; OG; ECMO canula x2; RIJ trialysis, LIJ CVC and gertrude, r-radial a-line, Chest tube x4; sun; WV, VAD; midline      Dispo/Code Status/Palliative:   -- SICU / Full Code.              Dang Amezcua MD  Critical Care - Surgery  [facility]

## 2022-07-19 NOTE — PLAN OF CARE
"SICU PLAN OF CARE NOTE    Dx: Left ventricular assist device (LVAD) complication    Goals of Care:  MAP 70-85    Vital Signs:  BP (!) 84/0 (BP Location: Left arm, Patient Position: Lying)   Pulse 95   Temp 97.88 °F (36.6 °C) (Core Bladder)   Resp (!) 23   Ht 6' 0.99" (1.854 m)   Wt 111.6 kg (246 lb)   SpO2 98%   BMI 32.46 kg/m²     Cardiac: afib rate controlled ; VA ECMO fem-fem speed reduced today slowly from 4000 to 3400 - tolerated well, flows 3.1-3.3 on higher speed and 2.7-2.8 on reduced speed; LVAD speed 5500, flows ~4.6-4.8    Resp:  SpO2 98% on ACVC 50% (weaned down from 70%), 7 PEEP    Neuro:  Sedated, Follows Commands, and Moves All Extremities    Gtts:  Precedex, Vasopressin, Epinephrine, Lasix, and Heparin    Urine Output:  Urinary Catheter 1785 cc/shift    Drains:  Chest Tube, total output 280 cc /  shift    Diet:  Tube Feeds    Skin:  All skin remains free from injury.  Patient turned Q2, specialty mattress inflated, ICU bed working correctly.    Shift Events:  ECMO weaned today successfully - prepare for possible decannulation tomorrow - wife at bedside and updated.  See flowsheet for further assessment/details.  Family updated on current condition/plan of care, questions answered, and emotional support provided.   MD updated on current condition, vitals, labs, and gtts.  No new orders received, will continue to monitor.     "

## 2022-07-19 NOTE — ASSESSMENT & PLAN NOTE
Procedure: Device Interrogation Including analysis of device parameters  Current Settings: Ventricular Assist Device  Review of device function is stable    TXP LVAD INTERROGATIONS 7/19/2022 7/19/2022 7/19/2022 7/19/2022 7/19/2022 7/19/2022 7/19/2022   Type HeartMate3 HeartMate3 HeartMate3 HeartMate3 HeartMate3 HeartMate3 HeartMate3   Flow 4.6 4.6 4.6 4.6 4.7 4.6 4.7   Speed 5500 5500 5500 5500 5500 5500 5500   PI 2.1 2.2 2.1 2.1 2.1 2.2 2.2   Power (Jackson) 4.0 3.9 3.9 3.9 4.0 3.9 4.0   LSL - - - - - - -   Pulsatility - - - - - - -

## 2022-07-19 NOTE — PROGRESS NOTES
ECMO camped off at 1708 and decannulated at bedside by Dr Reaves. Patient tolerated procedure well with no events.

## 2022-07-19 NOTE — ASSESSMENT & PLAN NOTE
-Ischemic ATN 2/2 decrease perfusion severe hypotension in a patient with known CKD 3b  Baseline sCr: 1.9-2.3  On admission his Cr was: 2.5  Lab Results   Component Value Date    CREATININE 2.5 (H) 07/19/2022     7/15 urine microscopy showed: many granular casts, muddy brown casts, waxy casts, some WBCS, some yeast, and occasional RBCs    Recommendations:   -would hold diuretics today, sNa 151, see hyperNa  -Electrolytes: K 4.2, renal diet, page nephrology if K >5.5    Phos level 3.3, DC phos binders    Mg 2.7, goal >2  -Acid/base: AGMA 2/2 ATN  -Fluid balance: net ->4 in the last 48 hrs, pt is 3rd spacing as his albumin is 2   -Anemia: Hgb 8.2, send anemia panel labs, transfuse for Hgb <7.0  -Strict I/O's and daily weights  -Renal function panel and Mg levels Q8H   -Renal ultrasound reviewed: no hydronephrosis and minimal CKD changes  -Maintain MAP >65 for renal perfusion    There is no immediate indication for KRT at this time

## 2022-07-19 NOTE — NURSING
Pt intubated and sedated with LVAD and ECMO ongoing support. Sat with patient's wife and stepson for approximately an hour. Emotional support provided to wife. Wife had many questions which I answered to wife's satisfaction. Wife is extremely thankful and optimistic for patient's recovery.

## 2022-07-19 NOTE — ASSESSMENT & PLAN NOTE
WAGNER on CKD  Patient's baseline is 1.5-2.0  - Nephrology is following  - Avoiding nephrotoxic medications  - Continue to monitor  - Strict I/O's

## 2022-07-19 NOTE — PROGRESS NOTES
[facility]  Heart Transplant  Progress Note    Patient Name: Tim Richards  MRN: [id number]  Admission Date: 6/27/2022  Hospital Length of Stay: 22 days  Attending Physician: Yg Kaufman MD  Primary Care Provider: Deyanira Booth MD  Principal Problem:Left ventricular assist device (LVAD) complication    Subjective:     Interval History:   The patient underwent HM III upgrade on 7/13/22.  He returned from the OR intubated and on VA ECMO and has remained intubated and on VA ECMO since that time.  He underwent washout and his sternum was closed.    Currently on the following drips:  Lasix 1 mg/hr  Epi at 0.06  Vasopressin at 0.04  Heparin gtt  Precedex @ 1.2 mcg/kg/hr    Continuous Infusions:   dexmedetomidine (PRECEDEX) infusion 1.2 mcg/kg/hr (07/19/22 1500)    EPINEPHrine 0.06 mcg/kg/min (07/19/22 1500)    furosemide (LASIX) 2 mg/mL continuous infusion (non-titrating) 1 mg/hr (07/19/22 1500)    heparin (porcine) in 5 % dex 800 Units/hr (07/19/22 1500)    nitric oxide gas      NORepinephrine bitartrate-D5W Stopped (07/17/22 1647)    vasopressin 0.04 Units/min (07/19/22 1500)     Scheduled Meds:   docusate  100 mg Per NG tube BID    levothyroxine  112 mcg Per OG tube Before breakfast    pantoprazole  40 mg Intravenous BID    [START ON 7/20/2022] polyethylene glycol  17 g Per NG tube Daily     PRN Meds:sodium chloride, sodium chloride 0.9%, dextrose 10%, dextrose 10%, HYDROmorphone, HYDROmorphone    Review of patient's allergies indicates:   Allergen Reactions    Lisinopril Anaphylaxis    Hydralazine analogues      Chronic constipation, impotence, dizziness     Objective:     Vital Signs (Most Recent):  Temp: 98.06 °F (36.7 °C) (07/19/22 1545)  Pulse: 102 (07/19/22 1545)  Resp: (!) 21 (07/19/22 1501)  BP: (!) 75/0 (07/19/22 1502)  SpO2: 95 % (07/19/22 1545)   Vital Signs (24h Range):  Temp:  [97.7 °F (36.5 °C)-98.24 °F (36.8 °C)] 98.06 °F (36.7 °C)  Pulse:  []  102  Resp:  [20-25] 21  SpO2:  [83 %-98 %] 95 %  BP: (75-80)/(0) 75/0  Arterial Line BP: (69-85)/(57-74) 78/68     Patient Vitals for the past 72 hrs (Last 3 readings):   Weight   07/19/22 0500 108.9 kg (240 lb)   07/18/22 0639 111.6 kg (246 lb)   07/17/22 0533 118.8 kg (262 lb)     Body mass index is 31.67 kg/m².      Intake/Output Summary (Last 24 hours) at 7/19/2022 1606  Last data filed at 7/19/2022 1500  Gross per 24 hour   Intake 2396.95 ml   Output 2995 ml   Net -598.05 ml       Hemodynamic Parameters:       Physical Exam  Constitutional:       Appearance: He is obese.      Comments: Intubated, sedated   HENT:      Head: Normocephalic.      Nose: Nose normal.      Mouth/Throat:      Mouth: Mucous membranes are moist.   Eyes:      General: Scleral icterus present.   Cardiovascular:      Rate and Rhythm: Normal rate and regular rhythm.      Comments: VAD hum appreciated  Pulses detected via doppler  Pulmonary:      Comments: Coarse mechanical breath sounds bilaterally  Abdominal:      General: Bowel sounds are normal.      Palpations: Abdomen is soft.   Musculoskeletal:      Cervical back: Neck supple.      Right lower leg: No edema.      Left lower leg: No edema.   Skin:     General: Skin is warm.   Neurological:      Comments: Sedated    Psychiatric:      Comments: Sedated       Significant Labs:  CBC:  Recent Labs   Lab 07/18/22  1950 07/18/22  1957 07/19/22  0353 07/19/22  0354 07/19/22  1008 07/19/22  1137 07/19/22  1138   WBC 16.01*  --  15.13*  --   --  15.62*  --    RBC 2.75*  --  2.75*  --   --  2.73*  --    HGB 7.9*  --  8.0*  --   --  8.0*  --    HCT 25.7*   < > 25.5*   < > 33* 25.8* 30*   PLT 81*  --  99*  --   --  117*  --    MCV 94  --  93  --   --  95  --    MCH 28.7  --  29.1  --   --  29.3  --    MCHC 30.7*  --  31.4*  --   --  31.0*  --     < > = values in this interval not displayed.     BNP:  Recent Labs   Lab 07/15/22  0401 07/17/22  0359   BNP 1,106* 955*     CMP:  Recent Labs   Lab  07/19/22  0114 07/19/22  0353 07/19/22  1137   * 142* 134*   CALCIUM 9.0 9.2 9.4   ALBUMIN 2.0* 2.0* 2.0*   PROT 5.5* 5.6* 5.7*   * 146* 149*   K 3.8 4.2 4.5   CO2 22* 22* 23   * 114* 118*   BUN 65* 65* 63*   CREATININE 2.5* 2.5* 2.4*   ALKPHOS 113 117 119   ALT 50* 49* 48*   AST 78* 77* 77*   BILITOT 11.7* 12.1* 12.6*      Coagulation:   Recent Labs   Lab 07/18/22  1950 07/19/22  0353 07/19/22  1137   INR 1.1 1.1 1.1   APTT 28.8 29.1 28.9     LDH:  Recent Labs   Lab 07/17/22  0649 07/18/22  0318 07/19/22  0353   * 517* 588*     Microbiology:  Microbiology Results (last 7 days)       ** No results found for the last 168 hours. **            I have reviewed all pertinent labs within the past 24 hours.    Estimated Creatinine Clearance: 45 mL/min (A) (based on SCr of 2.4 mg/dL (H)).    Diagnostic Results:  I have reviewed and interpreted all pertinent imaging results/findings within the past 24 hours.    Assessment and Plan:     55 Y/O M with Hx of stage D HFrEF (EF 10%) due to NICM underwent HM2 implant 3/8/18 and exchange of outflow graft 3/9/18, Hx VT/VF s/p SICD 2014 presenting with gradual worsening shortness of breath associated with nonpleuritic, nonradiating bilateral 4/10 retrosternal chest pressure pain.  Symptoms began yesterday and have gradually worsened in the last 12 hours.  He does report going to a family picnic with increased consumption of chicken wings, ribs and other salty meat products.  Prior to symptom onset, he reports nausea, nonbloody diarrhea began yesterday and single episode of nonbloody nonbilious vomiting this morning. He also complains of dizziness, lightheadedness, and a mild HA. SOB worsened this morning prompting him to come to the ED.     In the ED, patient was in respiratory distress requiring BiPAP. MAP 96 and started on Nitro gtt. Work-up revealed WBC 10, K 5.4, Cr 2.5 (baseline 1.9), BNP 1950 (baseline 200-300s), Bili 2.1, LDH 1058, and INR 3.2.  Bedside TTE with severely reduced EF, AV not opening, septum bulging into RV. Given IV Lasix 80 mg x1 with only 100-200 mL UOP and dark in color. HM2 interrogated and flows have been 8.5-9 L/min with no alarms or power surges. Cardiology consulted in the ED, dicussed with HTS, and decision made to admit to ICU for further mgmt.       * Left ventricular assist device (LVAD) complication  The patient presented with COVID and found to have an uptrending LDH with increased power.  He underwent HM III upgrade on 7/13/22  -Daily LDH   -Continue Heparin drip    Procedure: Device Interrogation Including analysis of device parameters  Current Settings: Ventricular Assist Device    TXP LVAD INTERROGATIONS 7/19/2022 7/19/2022 7/19/2022 7/19/2022 7/19/2022 7/19/2022 7/19/2022   Type HeartMate3 HeartMate3 HeartMate3 HeartMate3 HeartMate3 HeartMate3 HeartMate3   Flow 4.6 4.6 4.6 4.6 4.7 4.6 4.7   Speed 5500 5500 5500 5500 5500 5500 5500   PI 2.1 2.2 2.1 2.1 2.1 2.2 2.2   Power (Jackson) 4.0 3.9 3.9 3.9 4.0 3.9 4.0   LSL - - - - - - -   Pulsatility - - - - - - -       Anxiety  -Zolpidem night prn  -Alprazolam prn anxiety  -Psych was consulted, appreciate recs  -Continue Melatonin 6 mg QHS PRN  -Doppler pulses only (no BP cuff) at night to encourage sleep  -Exchanged patient's bed    History of ventricular tachycardia  Patient experienced an episode of VT on 6/30 and experienced an ICD shock thought to be related to hypokalemia (K of 3.1 on 6/30)  - Aggressively replete K, keep K>4 and Mg>2  - Was previously on mexiletine 25mg q8hrs and amiodarone 400mg daily  - Continue to monitor on telemetry    COVID-19  -Previously hypoxic then recovered  -ID was consulted, recommended Remdesivir, course completed    Elevated serum lactate dehydrogenase (LDH)  Concerned for pump thrombosis  Cont to trend  Plan for pump exchange today    amiodarone induced hypothyrodism  -Continue levothyroxine 112 mcg     LVAD (left ventricular assist device)  present  Procedure: Device Interrogation Including analysis of device parameters  Current Settings: Ventricular Assist Device  Review of device function is stable    TXP LVAD INTERROGATIONS 7/19/2022 7/19/2022 7/19/2022 7/19/2022 7/19/2022 7/19/2022 7/19/2022   Type HeartMate3 HeartMate3 HeartMate3 HeartMate3 HeartMate3 HeartMate3 HeartMate3   Flow 4.6 4.6 4.6 4.6 4.7 4.6 4.7   Speed 5500 5500 5500 5500 5500 5500 5500   PI 2.1 2.2 2.1 2.1 2.1 2.2 2.2   Power (Jackson) 4.0 3.9 3.9 3.9 4.0 3.9 4.0   LSL - - - - - - -   Pulsatility - - - - - - -       CKD (chronic kidney disease), stage III  WAGNER on CKD  Patient's baseline is 1.5-2.0  - Nephrology is following  - Avoiding nephrotoxic medications  - Continue to monitor  - Strict I/O's    Chronic combined systolic and diastolic heart failure  #ADHF  #NICMP  Underwent HM III upgrade on 7/13/22, currently on VA ECMO  Currently on Lasix gtt  Currently on Epi gtt  Currently on Vasopressin gtt  Currently on Heparin gtt  Currently on Precedex gtt  Plan for decanulation today  Currently intubated and sedated           Harry Canales MD  Heart Transplant  [facility]

## 2022-07-20 LAB
ABO + RH BLD: NORMAL
ALBUMIN SERPL BCP-MCNC: 1.9 G/DL (ref 3.5–5.2)
ALBUMIN SERPL BCP-MCNC: 2 G/DL (ref 3.5–5.2)
ALBUMIN SERPL BCP-MCNC: 2.6 G/DL (ref 3.5–5.2)
ALLENS TEST: ABNORMAL
ALLENS TEST: NORMAL
ALP SERPL-CCNC: 110 U/L (ref 55–135)
ALP SERPL-CCNC: 121 U/L (ref 55–135)
ALP SERPL-CCNC: 123 U/L (ref 55–135)
ALP SERPL-CCNC: 124 U/L (ref 55–135)
ALP SERPL-CCNC: 124 U/L (ref 55–135)
ALT SERPL W/O P-5'-P-CCNC: 15 U/L (ref 10–44)
ALT SERPL W/O P-5'-P-CCNC: 47 U/L (ref 10–44)
ALT SERPL W/O P-5'-P-CCNC: 49 U/L (ref 10–44)
ALT SERPL W/O P-5'-P-CCNC: 50 U/L (ref 10–44)
ALT SERPL W/O P-5'-P-CCNC: 50 U/L (ref 10–44)
ANION GAP SERPL CALC-SCNC: 11 MMOL/L (ref 8–16)
ANION GAP SERPL CALC-SCNC: 11 MMOL/L (ref 8–16)
ANION GAP SERPL CALC-SCNC: 12 MMOL/L (ref 8–16)
ANION GAP SERPL CALC-SCNC: 7 MMOL/L (ref 8–16)
ANISOCYTOSIS BLD QL SMEAR: SLIGHT
ANISOCYTOSIS BLD QL SMEAR: SLIGHT
APTT BLDCRRT: 26.2 SEC (ref 21–32)
APTT BLDCRRT: 29.4 SEC (ref 21–32)
APTT BLDCRRT: 33 SEC (ref 21–32)
AST SERPL-CCNC: 18 U/L (ref 10–40)
AST SERPL-CCNC: 77 U/L (ref 10–40)
AST SERPL-CCNC: 78 U/L (ref 10–40)
BASOPHILS # BLD AUTO: 0.01 K/UL (ref 0–0.2)
BASOPHILS # BLD AUTO: 0.03 K/UL (ref 0–0.2)
BASOPHILS NFR BLD: 0.1 % (ref 0–1.9)
BASOPHILS NFR BLD: 0.2 % (ref 0–1.9)
BILIRUB DIRECT SERPL-MCNC: 10.3 MG/DL (ref 0.1–0.3)
BILIRUB SERPL-MCNC: 0.9 MG/DL (ref 0.1–1)
BILIRUB SERPL-MCNC: 12.9 MG/DL (ref 0.1–1)
BILIRUB SERPL-MCNC: 13.9 MG/DL (ref 0.1–1)
BILIRUB SERPL-MCNC: 13.9 MG/DL (ref 0.1–1)
BILIRUB SERPL-MCNC: 14.5 MG/DL (ref 0.1–1)
BLD GP AB SCN CELLS X3 SERPL QL: NORMAL
BLD PROD TYP BPU: NORMAL
BLD PROD TYP BPU: NORMAL
BLOOD UNIT EXPIRATION DATE: NORMAL
BLOOD UNIT EXPIRATION DATE: NORMAL
BLOOD UNIT TYPE CODE: 5100
BLOOD UNIT TYPE CODE: 5100
BLOOD UNIT TYPE: NORMAL
BLOOD UNIT TYPE: NORMAL
BNP SERPL-MCNC: 813 PG/ML (ref 0–99)
BUN SERPL-MCNC: 64 MG/DL (ref 6–20)
BUN SERPL-MCNC: 66 MG/DL (ref 6–20)
BUN SERPL-MCNC: 68 MG/DL (ref 6–20)
BUN SERPL-MCNC: 7 MG/DL (ref 6–20)
CA-I BLDV-SCNC: 1.19 MMOL/L (ref 1.06–1.42)
CALCIUM SERPL-MCNC: 9 MG/DL (ref 8.7–10.5)
CALCIUM SERPL-MCNC: 9.1 MG/DL (ref 8.7–10.5)
CALCIUM SERPL-MCNC: 9.3 MG/DL (ref 8.7–10.5)
CALCIUM SERPL-MCNC: 9.4 MG/DL (ref 8.7–10.5)
CHLORIDE SERPL-SCNC: 109 MMOL/L (ref 95–110)
CHLORIDE SERPL-SCNC: 115 MMOL/L (ref 95–110)
CHLORIDE SERPL-SCNC: 117 MMOL/L (ref 95–110)
CHLORIDE SERPL-SCNC: 119 MMOL/L (ref 95–110)
CO2 SERPL-SCNC: 20 MMOL/L (ref 23–29)
CO2 SERPL-SCNC: 21 MMOL/L (ref 23–29)
CO2 SERPL-SCNC: 22 MMOL/L (ref 23–29)
CO2 SERPL-SCNC: 23 MMOL/L (ref 23–29)
CODING SYSTEM: NORMAL
CODING SYSTEM: NORMAL
CREAT SERPL-MCNC: 0.4 MG/DL (ref 0.5–1.4)
CREAT SERPL-MCNC: 2.4 MG/DL (ref 0.5–1.4)
CREAT SERPL-MCNC: 2.5 MG/DL (ref 0.5–1.4)
CREAT SERPL-MCNC: 2.5 MG/DL (ref 0.5–1.4)
CRP SERPL-MCNC: 201.3 MG/L (ref 0–8.2)
DELSYS: ABNORMAL
DELSYS: NORMAL
DELSYS: NORMAL
DIFFERENTIAL METHOD: ABNORMAL
DISPENSE STATUS: NORMAL
DISPENSE STATUS: NORMAL
EOSINOPHIL # BLD AUTO: 0 K/UL (ref 0–0.5)
EOSINOPHIL # BLD AUTO: 0 K/UL (ref 0–0.5)
EOSINOPHIL # BLD AUTO: 0.1 K/UL (ref 0–0.5)
EOSINOPHIL # BLD AUTO: 0.1 K/UL (ref 0–0.5)
EOSINOPHIL NFR BLD: 0.1 % (ref 0–8)
EOSINOPHIL NFR BLD: 0.1 % (ref 0–8)
EOSINOPHIL NFR BLD: 0.3 % (ref 0–8)
EOSINOPHIL NFR BLD: 0.5 % (ref 0–8)
ERYTHROCYTE [DISTWIDTH] IN BLOOD BY AUTOMATED COUNT: 18 % (ref 11.5–14.5)
ERYTHROCYTE [DISTWIDTH] IN BLOOD BY AUTOMATED COUNT: 18.2 % (ref 11.5–14.5)
ERYTHROCYTE [DISTWIDTH] IN BLOOD BY AUTOMATED COUNT: 18.3 % (ref 11.5–14.5)
ERYTHROCYTE [DISTWIDTH] IN BLOOD BY AUTOMATED COUNT: 18.3 % (ref 11.5–14.5)
ERYTHROCYTE [SEDIMENTATION RATE] IN BLOOD BY WESTERGREN METHOD: 16 MM/H
EST. GFR  (AFRICAN AMERICAN): 32.2 ML/MIN/1.73 M^2
EST. GFR  (AFRICAN AMERICAN): 32.2 ML/MIN/1.73 M^2
EST. GFR  (AFRICAN AMERICAN): 33.8 ML/MIN/1.73 M^2
EST. GFR  (AFRICAN AMERICAN): >60 ML/MIN/1.73 M^2
EST. GFR  (NON AFRICAN AMERICAN): 27.9 ML/MIN/1.73 M^2
EST. GFR  (NON AFRICAN AMERICAN): 27.9 ML/MIN/1.73 M^2
EST. GFR  (NON AFRICAN AMERICAN): 29.3 ML/MIN/1.73 M^2
EST. GFR  (NON AFRICAN AMERICAN): >60 ML/MIN/1.73 M^2
FIBRINOGEN PPP-MCNC: >800 MG/DL (ref 182–400)
FIBRINOGEN PPP-MCNC: >800 MG/DL (ref 182–400)
FIO2: 50
FIO2: 60
GLUCOSE SERPL-MCNC: 102 MG/DL (ref 70–110)
GLUCOSE SERPL-MCNC: 123 MG/DL (ref 70–110)
GLUCOSE SERPL-MCNC: 134 MG/DL (ref 70–110)
GLUCOSE SERPL-MCNC: 167 MG/DL (ref 70–110)
HCO3 UR-SCNC: 21.7 MMOL/L (ref 24–28)
HCO3 UR-SCNC: 22.3 MMOL/L (ref 24–28)
HCO3 UR-SCNC: 22.8 MMOL/L (ref 24–28)
HCO3 UR-SCNC: 23.5 MMOL/L (ref 24–28)
HCO3 UR-SCNC: 24.4 MMOL/L (ref 24–28)
HCT VFR BLD AUTO: 24.6 % (ref 40–54)
HCT VFR BLD AUTO: 24.7 % (ref 40–54)
HCT VFR BLD AUTO: 25.5 % (ref 40–54)
HCT VFR BLD AUTO: 25.5 % (ref 40–54)
HCT VFR BLD CALC: 22 %PCV (ref 36–54)
HCT VFR BLD CALC: 23 %PCV (ref 36–54)
HCT VFR BLD CALC: 23 %PCV (ref 36–54)
HCT VFR BLD CALC: 24 %PCV (ref 36–54)
HCT VFR BLD CALC: 24 %PCV (ref 36–54)
HGB BLD-MCNC: 7.5 G/DL (ref 14–18)
HGB BLD-MCNC: 7.6 G/DL (ref 14–18)
HGB BLD-MCNC: 7.7 G/DL (ref 14–18)
HGB BLD-MCNC: 7.8 G/DL (ref 14–18)
HYPOCHROMIA BLD QL SMEAR: ABNORMAL
IMM GRANULOCYTES # BLD AUTO: 0.48 K/UL (ref 0–0.04)
IMM GRANULOCYTES # BLD AUTO: 0.68 K/UL (ref 0–0.04)
IMM GRANULOCYTES # BLD AUTO: 0.74 K/UL (ref 0–0.04)
IMM GRANULOCYTES # BLD AUTO: 0.75 K/UL (ref 0–0.04)
IMM GRANULOCYTES NFR BLD AUTO: 2.4 % (ref 0–0.5)
IMM GRANULOCYTES NFR BLD AUTO: 2.5 % (ref 0–0.5)
IMM GRANULOCYTES NFR BLD AUTO: 4.3 % (ref 0–0.5)
IMM GRANULOCYTES NFR BLD AUTO: 4.5 % (ref 0–0.5)
INR PPP: 1.2 (ref 0.8–1.2)
INR PPP: 1.3 (ref 0.8–1.2)
LACTATE SERPL-SCNC: 1.1 MMOL/L (ref 0.5–2.2)
LDH SERPL L TO P-CCNC: 0.82 MMOL/L (ref 0.36–1.25)
LDH SERPL L TO P-CCNC: 0.97 MMOL/L (ref 0.36–1.25)
LDH SERPL L TO P-CCNC: 1.14 MMOL/L (ref 0.36–1.25)
LDH SERPL L TO P-CCNC: 1.34 MMOL/L (ref 0.36–1.25)
LDH SERPL L TO P-CCNC: 578 U/L (ref 110–260)
LYMPHOCYTES # BLD AUTO: 0.6 K/UL (ref 1–4.8)
LYMPHOCYTES # BLD AUTO: 0.9 K/UL (ref 1–4.8)
LYMPHOCYTES # BLD AUTO: 1 K/UL (ref 1–4.8)
LYMPHOCYTES # BLD AUTO: 1 K/UL (ref 1–4.8)
LYMPHOCYTES NFR BLD: 3.7 % (ref 18–48)
LYMPHOCYTES NFR BLD: 3.8 % (ref 18–48)
LYMPHOCYTES NFR BLD: 4.8 % (ref 18–48)
LYMPHOCYTES NFR BLD: 5.1 % (ref 18–48)
MAGNESIUM SERPL-MCNC: 2 MG/DL (ref 1.6–2.6)
MAGNESIUM SERPL-MCNC: 2.9 MG/DL (ref 1.6–2.6)
MAGNESIUM SERPL-MCNC: 3 MG/DL (ref 1.6–2.6)
MAGNESIUM SERPL-MCNC: 3.1 MG/DL (ref 1.6–2.6)
MCH RBC QN AUTO: 28.2 PG (ref 27–31)
MCH RBC QN AUTO: 28.3 PG (ref 27–31)
MCH RBC QN AUTO: 28.6 PG (ref 27–31)
MCH RBC QN AUTO: 29.4 PG (ref 27–31)
MCHC RBC AUTO-ENTMCNC: 30.2 G/DL (ref 32–36)
MCHC RBC AUTO-ENTMCNC: 30.5 G/DL (ref 32–36)
MCHC RBC AUTO-ENTMCNC: 30.6 G/DL (ref 32–36)
MCHC RBC AUTO-ENTMCNC: 30.8 G/DL (ref 32–36)
MCV RBC AUTO: 92 FL (ref 82–98)
MCV RBC AUTO: 93 FL (ref 82–98)
MCV RBC AUTO: 95 FL (ref 82–98)
MCV RBC AUTO: 96 FL (ref 82–98)
METHEMOGLOBIN: 0.8 % (ref 0–3)
MODE: ABNORMAL
MODE: NORMAL
MONOCYTES # BLD AUTO: 1.7 K/UL (ref 0.3–1)
MONOCYTES # BLD AUTO: 1.8 K/UL (ref 0.3–1)
MONOCYTES # BLD AUTO: 2.2 K/UL (ref 0.3–1)
MONOCYTES # BLD AUTO: 2.2 K/UL (ref 0.3–1)
MONOCYTES NFR BLD: 10 % (ref 4–15)
MONOCYTES NFR BLD: 10.7 % (ref 4–15)
MONOCYTES NFR BLD: 10.7 % (ref 4–15)
MONOCYTES NFR BLD: 8 % (ref 4–15)
NEUTROPHILS # BLD AUTO: 13.3 K/UL (ref 1.8–7.7)
NEUTROPHILS # BLD AUTO: 13.8 K/UL (ref 1.8–7.7)
NEUTROPHILS # BLD AUTO: 16.5 K/UL (ref 1.8–7.7)
NEUTROPHILS # BLD AUTO: 23.1 K/UL (ref 1.8–7.7)
NEUTROPHILS NFR BLD: 80.1 % (ref 38–73)
NEUTROPHILS NFR BLD: 80.4 % (ref 38–73)
NEUTROPHILS NFR BLD: 81.9 % (ref 38–73)
NEUTROPHILS NFR BLD: 85.6 % (ref 38–73)
NRBC BLD-RTO: 0 /100 WBC
OVALOCYTES BLD QL SMEAR: ABNORMAL
PAPPENHEIMER BOD BLD QL SMEAR: ABNORMAL
PCO2 BLDA: 35.2 MMHG (ref 35–45)
PCO2 BLDA: 35.5 MMHG (ref 35–45)
PCO2 BLDA: 36.2 MMHG (ref 35–45)
PCO2 BLDA: 41.7 MMHG (ref 35–45)
PCO2 BLDA: 43.8 MMHG (ref 35–45)
PEEP: 7
PH SMN: 7.35 [PH] (ref 7.35–7.45)
PH SMN: 7.35 [PH] (ref 7.35–7.45)
PH SMN: 7.39 [PH] (ref 7.35–7.45)
PH SMN: 7.41 [PH] (ref 7.35–7.45)
PH SMN: 7.43 [PH] (ref 7.35–7.45)
PHOSPHATE SERPL-MCNC: 3.3 MG/DL (ref 2.7–4.5)
PHOSPHATE SERPL-MCNC: 4.3 MG/DL (ref 2.7–4.5)
PHOSPHATE SERPL-MCNC: 4.4 MG/DL (ref 2.7–4.5)
PHOSPHATE SERPL-MCNC: 4.5 MG/DL (ref 2.7–4.5)
PLATELET # BLD AUTO: 148 K/UL (ref 150–450)
PLATELET # BLD AUTO: 153 K/UL (ref 150–450)
PLATELET # BLD AUTO: 186 K/UL (ref 150–450)
PLATELET # BLD AUTO: 200 K/UL (ref 150–450)
PLATELET BLD QL SMEAR: ABNORMAL
PMV BLD AUTO: 12.7 FL (ref 9.2–12.9)
PMV BLD AUTO: 12.9 FL (ref 9.2–12.9)
PMV BLD AUTO: 13.3 FL (ref 9.2–12.9)
PMV BLD AUTO: 13.4 FL (ref 9.2–12.9)
PO2 BLDA: 100 MMHG (ref 80–100)
PO2 BLDA: 105 MMHG (ref 80–100)
PO2 BLDA: 66 MMHG (ref 80–100)
PO2 BLDA: 73 MMHG (ref 80–100)
PO2 BLDA: 76 MMHG (ref 80–100)
POC BE: -1 MMOL/L
POC BE: -1 MMOL/L
POC BE: -2 MMOL/L
POC BE: -3 MMOL/L
POC BE: -3 MMOL/L
POC IONIZED CALCIUM: 1.19 MMOL/L (ref 1.06–1.42)
POC IONIZED CALCIUM: 1.21 MMOL/L (ref 1.06–1.42)
POC IONIZED CALCIUM: 1.22 MMOL/L (ref 1.06–1.42)
POC SATURATED O2: 94 % (ref 95–100)
POC SATURATED O2: 95 % (ref 95–100)
POC SATURATED O2: 95 % (ref 95–100)
POC SATURATED O2: 97 % (ref 95–100)
POC SATURATED O2: 98 % (ref 95–100)
POC TCO2: 23 MMOL/L (ref 23–27)
POC TCO2: 23 MMOL/L (ref 23–27)
POC TCO2: 24 MMOL/L (ref 23–27)
POC TCO2: 25 MMOL/L (ref 23–27)
POC TCO2: 26 MMOL/L (ref 23–27)
POCT GLUCOSE: 130 MG/DL (ref 70–110)
POCT GLUCOSE: 162 MG/DL (ref 70–110)
POIKILOCYTOSIS BLD QL SMEAR: SLIGHT
POLYCHROMASIA BLD QL SMEAR: ABNORMAL
POLYCHROMASIA BLD QL SMEAR: ABNORMAL
POTASSIUM BLD-SCNC: 3.8 MMOL/L (ref 3.5–5.1)
POTASSIUM BLD-SCNC: 3.9 MMOL/L (ref 3.5–5.1)
POTASSIUM BLD-SCNC: 4.2 MMOL/L (ref 3.5–5.1)
POTASSIUM BLD-SCNC: 4.6 MMOL/L (ref 3.5–5.1)
POTASSIUM BLD-SCNC: 4.9 MMOL/L (ref 3.5–5.1)
POTASSIUM SERPL-SCNC: 3.6 MMOL/L (ref 3.5–5.1)
POTASSIUM SERPL-SCNC: 4 MMOL/L (ref 3.5–5.1)
POTASSIUM SERPL-SCNC: 4.5 MMOL/L (ref 3.5–5.1)
POTASSIUM SERPL-SCNC: 5 MMOL/L (ref 3.5–5.1)
PROT SERPL-MCNC: 4.3 G/DL (ref 6–8.4)
PROT SERPL-MCNC: 5.8 G/DL (ref 6–8.4)
PROT SERPL-MCNC: 5.9 G/DL (ref 6–8.4)
PROTHROMBIN TIME: 12.4 SEC (ref 9–12.5)
PROTHROMBIN TIME: 12.5 SEC (ref 9–12.5)
PROTHROMBIN TIME: 12.8 SEC (ref 9–12.5)
PROTHROMBIN TIME: 13.3 SEC (ref 9–12.5)
RBC # BLD AUTO: 2.65 M/UL (ref 4.6–6.2)
RBC # BLD AUTO: 2.66 M/UL (ref 4.6–6.2)
RBC # BLD AUTO: 2.69 M/UL (ref 4.6–6.2)
RBC # BLD AUTO: 2.69 M/UL (ref 4.6–6.2)
SAMPLE: ABNORMAL
SAMPLE: NORMAL
SCHISTOCYTES BLD QL SMEAR: ABNORMAL
SITE: ABNORMAL
SITE: NORMAL
SODIUM BLD-SCNC: 150 MMOL/L (ref 136–145)
SODIUM BLD-SCNC: 153 MMOL/L (ref 136–145)
SODIUM SERPL-SCNC: 139 MMOL/L (ref 136–145)
SODIUM SERPL-SCNC: 149 MMOL/L (ref 136–145)
SODIUM SERPL-SCNC: 149 MMOL/L (ref 136–145)
SODIUM SERPL-SCNC: 150 MMOL/L (ref 136–145)
TRANS ERYTHROCYTES VOL PATIENT: NORMAL ML
TRANS ERYTHROCYTES VOL PATIENT: NORMAL ML
VT: 400
WBC # BLD AUTO: 16.52 K/UL (ref 3.9–12.7)
WBC # BLD AUTO: 17.18 K/UL (ref 3.9–12.7)
WBC # BLD AUTO: 20.15 K/UL (ref 3.9–12.7)
WBC # BLD AUTO: 26.96 K/UL (ref 3.9–12.7)

## 2022-07-20 PROCEDURE — 37799 UNLISTED PX VASCULAR SURGERY: CPT

## 2022-07-20 PROCEDURE — 63600175 PHARM REV CODE 636 W HCPCS: Performed by: THORACIC SURGERY (CARDIOTHORACIC VASCULAR SURGERY)

## 2022-07-20 PROCEDURE — 63600175 PHARM REV CODE 636 W HCPCS

## 2022-07-20 PROCEDURE — 83880 ASSAY OF NATRIURETIC PEPTIDE: CPT | Performed by: STUDENT IN AN ORGANIZED HEALTH CARE EDUCATION/TRAINING PROGRAM

## 2022-07-20 PROCEDURE — 63600175 PHARM REV CODE 636 W HCPCS: Performed by: ANESTHESIOLOGY

## 2022-07-20 PROCEDURE — 99900035 HC TECH TIME PER 15 MIN (STAT)

## 2022-07-20 PROCEDURE — 83735 ASSAY OF MAGNESIUM: CPT | Performed by: STUDENT IN AN ORGANIZED HEALTH CARE EDUCATION/TRAINING PROGRAM

## 2022-07-20 PROCEDURE — C9113 INJ PANTOPRAZOLE SODIUM, VIA: HCPCS

## 2022-07-20 PROCEDURE — 86901 BLOOD TYPING SEROLOGIC RH(D): CPT | Performed by: STUDENT IN AN ORGANIZED HEALTH CARE EDUCATION/TRAINING PROGRAM

## 2022-07-20 PROCEDURE — 99232 SBSQ HOSP IP/OBS MODERATE 35: CPT | Mod: ,,, | Performed by: INTERNAL MEDICINE

## 2022-07-20 PROCEDURE — 25000242 PHARM REV CODE 250 ALT 637 W/ HCPCS

## 2022-07-20 PROCEDURE — 87040 BLOOD CULTURE FOR BACTERIA: CPT | Mod: 59 | Performed by: THORACIC SURGERY (CARDIOTHORACIC VASCULAR SURGERY)

## 2022-07-20 PROCEDURE — 63600175 PHARM REV CODE 636 W HCPCS: Performed by: STUDENT IN AN ORGANIZED HEALTH CARE EDUCATION/TRAINING PROGRAM

## 2022-07-20 PROCEDURE — 99291 CRITICAL CARE FIRST HOUR: CPT | Mod: ,,, | Performed by: INTERNAL MEDICINE

## 2022-07-20 PROCEDURE — 84132 ASSAY OF SERUM POTASSIUM: CPT

## 2022-07-20 PROCEDURE — 99291 PR CRITICAL CARE, E/M 30-74 MINUTES: ICD-10-PCS | Mod: 25,,, | Performed by: ANESTHESIOLOGY

## 2022-07-20 PROCEDURE — 94640 AIRWAY INHALATION TREATMENT: CPT

## 2022-07-20 PROCEDURE — 99900025 HC BRONCHOSCOPY-ASST (STAT)

## 2022-07-20 PROCEDURE — 25000003 PHARM REV CODE 250

## 2022-07-20 PROCEDURE — 87071 CULTURE AEROBIC QUANT OTHER: CPT

## 2022-07-20 PROCEDURE — 85014 HEMATOCRIT: CPT

## 2022-07-20 PROCEDURE — 85384 FIBRINOGEN ACTIVITY: CPT | Performed by: STUDENT IN AN ORGANIZED HEALTH CARE EDUCATION/TRAINING PROGRAM

## 2022-07-20 PROCEDURE — 99232 PR SUBSEQUENT HOSPITAL CARE,LEVL II: ICD-10-PCS | Mod: ,,, | Performed by: INTERNAL MEDICINE

## 2022-07-20 PROCEDURE — 31624 DX BRONCHOSCOPE/LAVAGE: CPT

## 2022-07-20 PROCEDURE — 93010 ELECTROCARDIOGRAM REPORT: CPT | Mod: ,,, | Performed by: INTERNAL MEDICINE

## 2022-07-20 PROCEDURE — 84295 ASSAY OF SERUM SODIUM: CPT

## 2022-07-20 PROCEDURE — 93750 INTERROGATION VAD IN PERSON: CPT | Mod: ,,, | Performed by: INTERNAL MEDICINE

## 2022-07-20 PROCEDURE — 83605 ASSAY OF LACTIC ACID: CPT

## 2022-07-20 PROCEDURE — 83615 LACTATE (LD) (LDH) ENZYME: CPT | Performed by: STUDENT IN AN ORGANIZED HEALTH CARE EDUCATION/TRAINING PROGRAM

## 2022-07-20 PROCEDURE — 87186 SC STD MICRODIL/AGAR DIL: CPT

## 2022-07-20 PROCEDURE — 80053 COMPREHEN METABOLIC PANEL: CPT | Mod: 91 | Performed by: THORACIC SURGERY (CARDIOTHORACIC VASCULAR SURGERY)

## 2022-07-20 PROCEDURE — 80076 HEPATIC FUNCTION PANEL: CPT | Performed by: STUDENT IN AN ORGANIZED HEALTH CARE EDUCATION/TRAINING PROGRAM

## 2022-07-20 PROCEDURE — 85610 PROTHROMBIN TIME: CPT | Mod: 91 | Performed by: THORACIC SURGERY (CARDIOTHORACIC VASCULAR SURGERY)

## 2022-07-20 PROCEDURE — 99291 CRITICAL CARE FIRST HOUR: CPT | Mod: 25,,, | Performed by: ANESTHESIOLOGY

## 2022-07-20 PROCEDURE — 20000000 HC ICU ROOM

## 2022-07-20 PROCEDURE — 83050 HGB METHEMOGLOBIN QUAN: CPT

## 2022-07-20 PROCEDURE — 31645 BRNCHSC W/THER ASPIR 1ST: CPT | Mod: ,,, | Performed by: ANESTHESIOLOGY

## 2022-07-20 PROCEDURE — 86140 C-REACTIVE PROTEIN: CPT | Performed by: STUDENT IN AN ORGANIZED HEALTH CARE EDUCATION/TRAINING PROGRAM

## 2022-07-20 PROCEDURE — 25000003 PHARM REV CODE 250: Performed by: STUDENT IN AN ORGANIZED HEALTH CARE EDUCATION/TRAINING PROGRAM

## 2022-07-20 PROCEDURE — 25000242 PHARM REV CODE 250 ALT 637 W/ HCPCS: Performed by: STUDENT IN AN ORGANIZED HEALTH CARE EDUCATION/TRAINING PROGRAM

## 2022-07-20 PROCEDURE — 87205 SMEAR GRAM STAIN: CPT

## 2022-07-20 PROCEDURE — 83605 ASSAY OF LACTIC ACID: CPT | Performed by: STUDENT IN AN ORGANIZED HEALTH CARE EDUCATION/TRAINING PROGRAM

## 2022-07-20 PROCEDURE — 31645 PR BRONCHOSCOPY,RX ASPIR PULM TREE: ICD-10-PCS | Mod: ,,, | Performed by: ANESTHESIOLOGY

## 2022-07-20 PROCEDURE — 83735 ASSAY OF MAGNESIUM: CPT | Mod: 91 | Performed by: THORACIC SURGERY (CARDIOTHORACIC VASCULAR SURGERY)

## 2022-07-20 PROCEDURE — 82330 ASSAY OF CALCIUM: CPT

## 2022-07-20 PROCEDURE — 87077 CULTURE AEROBIC IDENTIFY: CPT

## 2022-07-20 PROCEDURE — 99900026 HC AIRWAY MAINTENANCE (STAT)

## 2022-07-20 PROCEDURE — 85384 FIBRINOGEN ACTIVITY: CPT | Mod: 91 | Performed by: THORACIC SURGERY (CARDIOTHORACIC VASCULAR SURGERY)

## 2022-07-20 PROCEDURE — 84100 ASSAY OF PHOSPHORUS: CPT | Mod: 91 | Performed by: THORACIC SURGERY (CARDIOTHORACIC VASCULAR SURGERY)

## 2022-07-20 PROCEDURE — 85730 THROMBOPLASTIN TIME PARTIAL: CPT | Performed by: STUDENT IN AN ORGANIZED HEALTH CARE EDUCATION/TRAINING PROGRAM

## 2022-07-20 PROCEDURE — 93010 EKG 12-LEAD: ICD-10-PCS | Mod: ,,, | Performed by: INTERNAL MEDICINE

## 2022-07-20 PROCEDURE — 85730 THROMBOPLASTIN TIME PARTIAL: CPT | Mod: 91 | Performed by: THORACIC SURGERY (CARDIOTHORACIC VASCULAR SURGERY)

## 2022-07-20 PROCEDURE — 27000221 HC OXYGEN, UP TO 24 HOURS

## 2022-07-20 PROCEDURE — 94761 N-INVAS EAR/PLS OXIMETRY MLT: CPT

## 2022-07-20 PROCEDURE — 63600367 HC NITRIC OXIDE PER HOUR

## 2022-07-20 PROCEDURE — 27000248 HC VAD-ADDITIONAL DAY

## 2022-07-20 PROCEDURE — 85025 COMPLETE CBC W/AUTO DIFF WBC: CPT | Performed by: STUDENT IN AN ORGANIZED HEALTH CARE EDUCATION/TRAINING PROGRAM

## 2022-07-20 PROCEDURE — 25000003 PHARM REV CODE 250: Performed by: THORACIC SURGERY (CARDIOTHORACIC VASCULAR SURGERY)

## 2022-07-20 PROCEDURE — 99291 PR CRITICAL CARE, E/M 30-74 MINUTES: ICD-10-PCS | Mod: ,,, | Performed by: INTERNAL MEDICINE

## 2022-07-20 PROCEDURE — 93750 PR INTERROGATE VENT ASSIST DEV, IN PERSON, W PHYSICIAN ANALYSIS: ICD-10-PCS | Mod: ,,, | Performed by: INTERNAL MEDICINE

## 2022-07-20 PROCEDURE — 80053 COMPREHEN METABOLIC PANEL: CPT | Performed by: STUDENT IN AN ORGANIZED HEALTH CARE EDUCATION/TRAINING PROGRAM

## 2022-07-20 PROCEDURE — 93005 ELECTROCARDIOGRAM TRACING: CPT

## 2022-07-20 PROCEDURE — 85025 COMPLETE CBC W/AUTO DIFF WBC: CPT | Mod: 91 | Performed by: THORACIC SURGERY (CARDIOTHORACIC VASCULAR SURGERY)

## 2022-07-20 PROCEDURE — 84100 ASSAY OF PHOSPHORUS: CPT | Performed by: STUDENT IN AN ORGANIZED HEALTH CARE EDUCATION/TRAINING PROGRAM

## 2022-07-20 PROCEDURE — 82803 BLOOD GASES ANY COMBINATION: CPT

## 2022-07-20 PROCEDURE — 94003 VENT MGMT INPAT SUBQ DAY: CPT

## 2022-07-20 PROCEDURE — 82330 ASSAY OF CALCIUM: CPT | Performed by: THORACIC SURGERY (CARDIOTHORACIC VASCULAR SURGERY)

## 2022-07-20 PROCEDURE — 85610 PROTHROMBIN TIME: CPT | Performed by: STUDENT IN AN ORGANIZED HEALTH CARE EDUCATION/TRAINING PROGRAM

## 2022-07-20 RX ORDER — FENTANYL CITRATE 50 UG/ML
INJECTION, SOLUTION INTRAMUSCULAR; INTRAVENOUS
Status: COMPLETED
Start: 2022-07-20 | End: 2022-07-20

## 2022-07-20 RX ORDER — ACETYLCYSTEINE 100 MG/ML
4 SOLUTION ORAL; RESPIRATORY (INHALATION)
Status: DISCONTINUED | OUTPATIENT
Start: 2022-07-20 | End: 2022-07-26

## 2022-07-20 RX ORDER — HEPARIN SODIUM 10000 [USP'U]/100ML
1600 INJECTION, SOLUTION INTRAVENOUS CONTINUOUS
Status: DISCONTINUED | OUTPATIENT
Start: 2022-07-20 | End: 2022-07-20

## 2022-07-20 RX ORDER — LIDOCAINE HYDROCHLORIDE 10 MG/ML
5 INJECTION INFILTRATION; PERINEURAL ONCE
Status: COMPLETED | OUTPATIENT
Start: 2022-07-20 | End: 2022-07-20

## 2022-07-20 RX ORDER — LIDOCAINE HYDROCHLORIDE 10 MG/ML
INJECTION, SOLUTION EPIDURAL; INFILTRATION; INTRACAUDAL; PERINEURAL
Status: COMPLETED
Start: 2022-07-20 | End: 2022-07-20

## 2022-07-20 RX ORDER — FENTANYL CITRATE 50 UG/ML
100 INJECTION, SOLUTION INTRAMUSCULAR; INTRAVENOUS ONCE
Status: COMPLETED | OUTPATIENT
Start: 2022-07-20 | End: 2022-07-20

## 2022-07-20 RX ORDER — SYRING-NEEDL,DISP,INSUL,0.3 ML 29 G X1/2"
148 SYRINGE, EMPTY DISPOSABLE MISCELLANEOUS ONCE
Status: DISCONTINUED | OUTPATIENT
Start: 2022-07-20 | End: 2022-07-21

## 2022-07-20 RX ORDER — PROPOFOL 10 MG/ML
VIAL (ML) INTRAVENOUS
Status: COMPLETED
Start: 2022-07-20 | End: 2022-07-20

## 2022-07-20 RX ORDER — POTASSIUM CHLORIDE 29.8 MG/ML
80 INJECTION INTRAVENOUS
Status: DISCONTINUED | OUTPATIENT
Start: 2022-07-20 | End: 2022-07-21

## 2022-07-20 RX ORDER — SODIUM CHLORIDE FOR INHALATION 3 %
4 VIAL, NEBULIZER (ML) INHALATION ONCE
Status: COMPLETED | OUTPATIENT
Start: 2022-07-20 | End: 2022-07-20

## 2022-07-20 RX ORDER — METOLAZONE 2.5 MG/1
10 TABLET ORAL ONCE
Status: COMPLETED | OUTPATIENT
Start: 2022-07-20 | End: 2022-07-20

## 2022-07-20 RX ORDER — SODIUM CHLORIDE 9 MG/ML
INJECTION, SOLUTION INTRAVENOUS
Status: DISCONTINUED | OUTPATIENT
Start: 2022-07-20 | End: 2022-09-01 | Stop reason: HOSPADM

## 2022-07-20 RX ORDER — CALCIUM GLUCONATE 20 MG/ML
1 INJECTION, SOLUTION INTRAVENOUS
Status: DISCONTINUED | OUTPATIENT
Start: 2022-07-20 | End: 2022-07-21

## 2022-07-20 RX ORDER — POLYETHYLENE GLYCOL 3350, SODIUM SULFATE ANHYDROUS, SODIUM BICARBONATE, SODIUM CHLORIDE, POTASSIUM CHLORIDE 236; 22.74; 6.74; 5.86; 2.97 G/4L; G/4L; G/4L; G/4L; G/4L
4000 POWDER, FOR SOLUTION ORAL ONCE
Status: COMPLETED | OUTPATIENT
Start: 2022-07-20 | End: 2022-07-20

## 2022-07-20 RX ORDER — FUROSEMIDE 10 MG/ML
10 INJECTION INTRAMUSCULAR; INTRAVENOUS ONCE
Status: COMPLETED | OUTPATIENT
Start: 2022-07-21 | End: 2022-07-20

## 2022-07-20 RX ORDER — CALCIUM GLUCONATE 20 MG/ML
2 INJECTION, SOLUTION INTRAVENOUS
Status: DISCONTINUED | OUTPATIENT
Start: 2022-07-20 | End: 2022-07-21

## 2022-07-20 RX ORDER — POTASSIUM CHLORIDE 14.9 MG/ML
60 INJECTION INTRAVENOUS
Status: DISCONTINUED | OUTPATIENT
Start: 2022-07-20 | End: 2022-07-21

## 2022-07-20 RX ORDER — LACTULOSE 10 G/15ML
20 SOLUTION ORAL 3 TIMES DAILY
Status: DISCONTINUED | OUTPATIENT
Start: 2022-07-20 | End: 2022-07-21

## 2022-07-20 RX ORDER — POTASSIUM CHLORIDE 29.8 MG/ML
40 INJECTION INTRAVENOUS
Status: DISCONTINUED | OUTPATIENT
Start: 2022-07-20 | End: 2022-07-21

## 2022-07-20 RX ORDER — LACTULOSE 10 G/15ML
20 SOLUTION ORAL 3 TIMES DAILY
Status: DISCONTINUED | OUTPATIENT
Start: 2022-07-20 | End: 2022-07-20

## 2022-07-20 RX ORDER — FENTANYL CITRATE 50 UG/ML
50 INJECTION, SOLUTION INTRAMUSCULAR; INTRAVENOUS ONCE
Status: COMPLETED | OUTPATIENT
Start: 2022-07-20 | End: 2022-07-20

## 2022-07-20 RX ORDER — HYDROMORPHONE HYDROCHLORIDE 1 MG/ML
1 INJECTION, SOLUTION INTRAMUSCULAR; INTRAVENOUS; SUBCUTANEOUS ONCE
Status: COMPLETED | OUTPATIENT
Start: 2022-07-20 | End: 2022-07-20

## 2022-07-20 RX ORDER — ACETAMINOPHEN 650 MG/20.3ML
1000 LIQUID ORAL EVERY 6 HOURS PRN
Status: DISCONTINUED | OUTPATIENT
Start: 2022-07-20 | End: 2022-07-21

## 2022-07-20 RX ORDER — FENTANYL CITRATE 50 UG/ML
25 INJECTION, SOLUTION INTRAMUSCULAR; INTRAVENOUS ONCE
Status: DISCONTINUED | OUTPATIENT
Start: 2022-07-20 | End: 2022-07-20

## 2022-07-20 RX ORDER — HEPARIN SODIUM 10000 [USP'U]/100ML
1800 INJECTION, SOLUTION INTRAVENOUS CONTINUOUS
Status: DISCONTINUED | OUTPATIENT
Start: 2022-07-20 | End: 2022-07-21

## 2022-07-20 RX ORDER — MAGNESIUM SULFATE HEPTAHYDRATE 40 MG/ML
2 INJECTION, SOLUTION INTRAVENOUS
Status: DISCONTINUED | OUTPATIENT
Start: 2022-07-20 | End: 2022-07-21

## 2022-07-20 RX ORDER — PROPOFOL 10 MG/ML
100 VIAL (ML) INTRAVENOUS ONCE
Status: COMPLETED | OUTPATIENT
Start: 2022-07-20 | End: 2022-07-20

## 2022-07-20 RX ORDER — CALCIUM GLUCONATE 20 MG/ML
3 INJECTION, SOLUTION INTRAVENOUS
Status: DISCONTINUED | OUTPATIENT
Start: 2022-07-20 | End: 2022-07-21

## 2022-07-20 RX ORDER — MAGNESIUM SULFATE HEPTAHYDRATE 40 MG/ML
4 INJECTION, SOLUTION INTRAVENOUS
Status: DISCONTINUED | OUTPATIENT
Start: 2022-07-20 | End: 2022-07-21

## 2022-07-20 RX ORDER — GUAIFENESIN 600 MG/1
600 TABLET, EXTENDED RELEASE ORAL 2 TIMES DAILY
Status: DISCONTINUED | OUTPATIENT
Start: 2022-07-20 | End: 2022-07-21

## 2022-07-20 RX ORDER — ALBUTEROL SULFATE 2.5 MG/.5ML
2.5 SOLUTION RESPIRATORY (INHALATION)
Status: DISCONTINUED | OUTPATIENT
Start: 2022-07-20 | End: 2022-07-26

## 2022-07-20 RX ORDER — ALBUTEROL SULFATE 2.5 MG/.5ML
2.5 SOLUTION RESPIRATORY (INHALATION) EVERY 4 HOURS PRN
Status: DISCONTINUED | OUTPATIENT
Start: 2022-07-20 | End: 2022-07-20

## 2022-07-20 RX ADMIN — HEPARIN SODIUM 1600 UNITS/HR: 5000 INJECTION INTRAVENOUS; SUBCUTANEOUS at 11:07

## 2022-07-20 RX ADMIN — METOLAZONE 10 MG: 2.5 TABLET ORAL at 10:07

## 2022-07-20 RX ADMIN — HEPARIN SODIUM 1600 UNITS/HR: 5000 INJECTION INTRAVENOUS; SUBCUTANEOUS at 10:07

## 2022-07-20 RX ADMIN — HYDROMORPHONE HYDROCHLORIDE 1 MG: 1 INJECTION, SOLUTION INTRAMUSCULAR; INTRAVENOUS; SUBCUTANEOUS at 12:07

## 2022-07-20 RX ADMIN — DEXMEDETOMIDINE HYDROCHLORIDE 0.5 MCG/KG/HR: 4 INJECTION INTRAVENOUS at 01:07

## 2022-07-20 RX ADMIN — FENTANYL CITRATE 100 MCG: 50 INJECTION, SOLUTION INTRAMUSCULAR; INTRAVENOUS at 10:07

## 2022-07-20 RX ADMIN — GUAIFENESIN 600 MG: 600 TABLET, EXTENDED RELEASE ORAL at 08:07

## 2022-07-20 RX ADMIN — DEXMEDETOMIDINE HYDROCHLORIDE 1.4 MCG/KG/HR: 4 INJECTION INTRAVENOUS at 11:07

## 2022-07-20 RX ADMIN — HYDROMORPHONE HYDROCHLORIDE 1 MG: 1 INJECTION, SOLUTION INTRAMUSCULAR; INTRAVENOUS; SUBCUTANEOUS at 07:07

## 2022-07-20 RX ADMIN — ACETYLCYSTEINE 4 ML: 100 INHALANT RESPIRATORY (INHALATION) at 08:07

## 2022-07-20 RX ADMIN — HYDROMORPHONE HYDROCHLORIDE 0.5 MG: 1 INJECTION, SOLUTION INTRAMUSCULAR; INTRAVENOUS; SUBCUTANEOUS at 04:07

## 2022-07-20 RX ADMIN — Medication 100 MG: at 10:07

## 2022-07-20 RX ADMIN — FENTANYL CITRATE 100 MCG: 50 INJECTION INTRAMUSCULAR; INTRAVENOUS at 10:07

## 2022-07-20 RX ADMIN — PIPERACILLIN SODIUM AND TAZOBACTAM SODIUM 4.5 G: 4; .5 INJECTION, POWDER, LYOPHILIZED, FOR SOLUTION INTRAVENOUS at 11:07

## 2022-07-20 RX ADMIN — VANCOMYCIN HYDROCHLORIDE 1250 MG: 1.25 INJECTION, POWDER, LYOPHILIZED, FOR SOLUTION INTRAVENOUS at 02:07

## 2022-07-20 RX ADMIN — PANTOPRAZOLE SODIUM 40 MG: 40 INJECTION, POWDER, FOR SOLUTION INTRAVENOUS at 09:07

## 2022-07-20 RX ADMIN — ACETAMINOPHEN 999.01 MG: 160 SOLUTION ORAL at 12:07

## 2022-07-20 RX ADMIN — ACETYLCYSTEINE 4 ML: 100 INHALANT RESPIRATORY (INHALATION) at 02:07

## 2022-07-20 RX ADMIN — HEPARIN SODIUM AND DEXTROSE 1800 UNITS/HR: 10000; 5 INJECTION INTRAVENOUS at 06:07

## 2022-07-20 RX ADMIN — HYDROMORPHONE HYDROCHLORIDE 1 MG: 1 INJECTION, SOLUTION INTRAMUSCULAR; INTRAVENOUS; SUBCUTANEOUS at 10:07

## 2022-07-20 RX ADMIN — LEVOTHYROXINE SODIUM 112 MCG: 112 TABLET ORAL at 06:07

## 2022-07-20 RX ADMIN — LIDOCAINE HYDROCHLORIDE 5 ML: 10 INJECTION, SOLUTION EPIDURAL; INFILTRATION; INTRACAUDAL at 10:07

## 2022-07-20 RX ADMIN — ACETYLCYSTEINE 4 ML: 100 INHALANT RESPIRATORY (INHALATION) at 09:07

## 2022-07-20 RX ADMIN — FENTANYL CITRATE 50 MCG: 50 INJECTION INTRAMUSCULAR; INTRAVENOUS at 03:07

## 2022-07-20 RX ADMIN — LACTULOSE 20 G: 20 SOLUTION ORAL at 08:07

## 2022-07-20 RX ADMIN — PROPOFOL 100 MG: 10 INJECTION, EMULSION INTRAVENOUS at 10:07

## 2022-07-20 RX ADMIN — GUAIFENESIN 600 MG: 600 TABLET, EXTENDED RELEASE ORAL at 12:07

## 2022-07-20 RX ADMIN — DEXMEDETOMIDINE HYDROCHLORIDE 1.4 MCG/KG/HR: 4 INJECTION INTRAVENOUS at 09:07

## 2022-07-20 RX ADMIN — VASOPRESSIN 0.04 UNITS/MIN: 20 INJECTION INTRAVENOUS at 04:07

## 2022-07-20 RX ADMIN — DEXMEDETOMIDINE HYDROCHLORIDE 1.4 MCG/KG/HR: 4 INJECTION INTRAVENOUS at 04:07

## 2022-07-20 RX ADMIN — FUROSEMIDE 10 MG: 10 INJECTION, SOLUTION INTRAMUSCULAR; INTRAVENOUS at 11:07

## 2022-07-20 RX ADMIN — FUROSEMIDE 1 MG/HR: 10 INJECTION, SOLUTION INTRAMUSCULAR; INTRAVENOUS at 04:07

## 2022-07-20 RX ADMIN — PANTOPRAZOLE SODIUM 40 MG: 40 INJECTION, POWDER, FOR SOLUTION INTRAVENOUS at 08:07

## 2022-07-20 RX ADMIN — POLYETHYLENE GLYCOL 3350, SODIUM SULFATE ANHYDROUS, SODIUM BICARBONATE, SODIUM CHLORIDE, POTASSIUM CHLORIDE 200 ML: 236; 22.74; 6.74; 5.86; 2.97 POWDER, FOR SOLUTION ORAL at 11:07

## 2022-07-20 RX ADMIN — EPINEPHRINE 0.1 MCG/KG/MIN: 1 INJECTION INTRAMUSCULAR; INTRAVENOUS; SUBCUTANEOUS at 05:07

## 2022-07-20 RX ADMIN — ALBUTEROL SULFATE 2.5 MG: 2.5 SOLUTION RESPIRATORY (INHALATION) at 02:07

## 2022-07-20 RX ADMIN — POTASSIUM CHLORIDE 40 MEQ: 29.8 INJECTION, SOLUTION INTRAVENOUS at 09:07

## 2022-07-20 RX ADMIN — MAGNESIUM SULFATE HEPTAHYDRATE 2 G: 40 INJECTION, SOLUTION INTRAVENOUS at 09:07

## 2022-07-20 RX ADMIN — LIDOCAINE HYDROCHLORIDE 5 ML: 10 INJECTION INFILTRATION; PERINEURAL at 10:07

## 2022-07-20 RX ADMIN — ACETAMINOPHEN 999.01 MG: 160 SOLUTION ORAL at 04:07

## 2022-07-20 RX ADMIN — LACTULOSE 20 G: 20 SOLUTION ORAL at 03:07

## 2022-07-20 RX ADMIN — DEXMEDETOMIDINE HYDROCHLORIDE 1.4 MCG/KG/HR: 4 INJECTION INTRAVENOUS at 07:07

## 2022-07-20 RX ADMIN — SODIUM CHLORIDE: 0.9 INJECTION, SOLUTION INTRAVENOUS at 09:07

## 2022-07-20 RX ADMIN — PIPERACILLIN SODIUM AND TAZOBACTAM SODIUM 4.5 G: 4; .5 INJECTION, POWDER, LYOPHILIZED, FOR SOLUTION INTRAVENOUS at 03:07

## 2022-07-20 RX ADMIN — POLYETHYLENE GLYCOL 3350 17 G: 17 POWDER, FOR SOLUTION ORAL at 09:07

## 2022-07-20 RX ADMIN — LACTULOSE 20 G: 20 SOLUTION ORAL at 12:07

## 2022-07-20 RX ADMIN — WARFARIN SODIUM 3 MG: 2 TABLET ORAL at 04:07

## 2022-07-20 RX ADMIN — HYDROMORPHONE HYDROCHLORIDE 1 MG: 1 INJECTION, SOLUTION INTRAMUSCULAR; INTRAVENOUS; SUBCUTANEOUS at 04:07

## 2022-07-20 RX ADMIN — ALBUTEROL SULFATE 2.5 MG: 2.5 SOLUTION RESPIRATORY (INHALATION) at 08:07

## 2022-07-20 RX ADMIN — DEXMEDETOMIDINE HYDROCHLORIDE 1 MCG/KG/HR: 4 INJECTION INTRAVENOUS at 09:07

## 2022-07-20 RX ADMIN — SODIUM CHLORIDE SOLN NEBU 3% 4 ML: 3 NEBU SOLN at 11:07

## 2022-07-20 NOTE — PROGRESS NOTES
07/20/2022  Carissa Dean    Current provider:  Yg Kaufman MD    Device interrogation:  TXP LVAD INTERROGATIONS 7/20/2022 7/20/2022 7/20/2022 7/20/2022 7/20/2022 7/20/2022 7/20/2022   Type HeartMate3 HeartMate3 HeartMate3 HeartMate3 HeartMate3 HeartMate3 HeartMate3   Flow 5.1 5.1 5.2 5.4 5.3 5.4 5.3   Speed 6200 6200 6200 6200 6200 6200 6200   PI 3.2 3.3 3.2 2.6 3.1 2.5 3   Power (Jackson) 5.1 5.1 5.1 5.2 5 5 5.1   LSL - - - 5800 5800 5800 5800   Pulsatility - - - Intermittent pulse Intermittent pulse Intermittent pulse Intermittent pulse          Rounded on Tim Richards to ensure all mechanical assist device settings (IABP or VAD) were appropriate and all parameters were within limits.  I was able to ensure all back up equipment was present, the staff had no issues, and the Perfusion Department daily rounding was complete.      For implantable VADs: Interrogation of Ventricular assist device was performed with analysis of device parameters and review of device function. I have personally reviewed the interrogation findings and agree with findings as stated.     In emergency, the nursing units have been notified to contact the perfusion department either by:  Calling [phone] from 630am to 4pm Mon thru Fri, utilizing the On-Call Finder functionality of Epic and searching for Perfusion, or by contacting the hospital  from 4pm to 630am and on weekends and asking to speak with the perfusionist on call.    10:25 AM

## 2022-07-20 NOTE — ASSESSMENT & PLAN NOTE
#ADHF  #NICMP  Underwent HM III upgrade on 7/13/22, currently on VA ECMO  Currently on Lasix gtt  Currently on Epi gtt  Currently on Vasopressin gtt  Currently on Heparin gtt  Currently on Precedex gtt  Being treated for possible sepsis  Currently intubated and sedated

## 2022-07-20 NOTE — PROGRESS NOTES
[facility]  Critical Care - Surgery  Progress Note    Patient Name: Tim Richards  MRN: [id number]  Admission Date: 6/27/2022  Hospital Length of Stay: 23 days  Code Status: Full Code  Attending Provider: Yg Kaufman MD  Primary Care Provider: Deyanira Booth MD   Principal Problem: Left ventricular assist device (LVAD) complication    Subjective:     Hospital/ICU Course:  No notes on file    Interval History/Significant Events: Decannulated from ECMO yesterday. Fever and hypotension overnight with levo up to 0.12. Now hemodynamically stable on 0.08 epi and 0.04 vaso. LVAD flows stable.    Follow-up For: Procedure(s) (LRB):  CLOSURE, WOUND, STERNUM (N/A)  APPLICATION, WOUND VAC (N/A)  INSERTION, GRAFT, PERICARDIUM (N/A)  IRRIGATION, MEDIASTINUM (N/A)    Post-Operative Day: 5 Days Post-Op    Objective:     Vital Signs (Most Recent):  Temp: 97.7 °F (36.5 °C) (07/20/22 0910)  Pulse: 96 (07/20/22 0910)  Resp: (!) 32 (07/20/22 0452)  BP: (!) 80/0 (07/20/22 0300)  SpO2: 95 % (ABG) (07/20/22 0400)   Vital Signs (24h Range):  Temp:  [95.72 °F (35.4 °C)-104.54 °F (40.3 °C)] 97.7 °F (36.5 °C)  Pulse:  [] 96  Resp:  [21-40] 32  SpO2:  [83 %-98 %] 95 %  BP: (70-80)/(0) 80/0  Arterial Line BP: (64-92)/(51-76) 84/74     Weight: 104.5 kg (230 lb 6.1 oz)  Body mass index is 30.4 kg/m².      Intake/Output Summary (Last 24 hours) at 7/20/2022 0915  Last data filed at 7/20/2022 0600  Gross per 24 hour   Intake 3390.6 ml   Output 3070 ml   Net 320.6 ml       Physical Exam  Constitutional:       Comments: Intubated and sedated   HENT:      Head: Normocephalic and atraumatic.      Mouth/Throat:      Comments: OG in place  Eyes:      Pupils: Pupils are equal, round, and reactive to light.   Neck:      Comments: L IJ CVC  R IJ trialysis  Cardiovascular:      Rate and Rhythm: Regular rhythm. Tachycardia present.      Comments:   LVAD in place -- 6200 rpm, 5.0L    Midline sternotomy c/d with dressing in  place    2 plerual and 2 mediastinal chest tubes to suction.    V pacing wires in place.   Pulmonary:      Comments: Mechanically ventilated  Abdominal:      General: There is no distension.      Palpations: Abdomen is soft.   Genitourinary:     Comments: Lagunas in place draining clear urine  Musculoskeletal:      Comments: ECMO cannula sites with dressing in place.     Cutdown incision with seroma when cannula removed yesterday which has resolved.    L brachial, right radial arterial line   Skin:     General: Skin is warm and dry.   Neurological:      Comments: Following commands when sedation paused       Vents:  Vent Mode: A/C (07/20/22 0910)  Ventilator Initiated: Yes (07/15/22 1027)  Set Rate: 16 BPM (07/20/22 0910)  Vt Set: 400 mL (07/20/22 0910)  PEEP/CPAP: 7 cmH20 (07/20/22 0910)  Oxygen Concentration (%): 50 (07/20/22 0910)  Peak Airway Pressure: 38 cmH2O (07/20/22 0910)  Plateau Pressure: 21 cmH20 (07/20/22 0910)  Total Ve: 12.8 mL (07/20/22 0910)  Negative Inspiratory Force (cm H2O): 0 (07/20/22 0910)  F/VT Ratio<105 (RSBI): (!) 51.16 (07/20/22 0328)    Lines/Drains/Airways       Central Venous Catheter Line  Duration              Introducer with Double Lumen 07/13/22 0900 left internal jugular 7 days    Percutaneous Central Line Insertion/Assessment - Quad Lumen  07/13/22 0941 left internal jugular 6 days    Trialysis (Dialysis) Catheter 07/13/22 2100 right internal jugular 6 days              Drain  Duration                  Urethral Catheter 07/13/22 0848 Temperature probe;Latex 14 Fr. 7 days         Chest Tube 07/13/22 1916 1 Right Pleural 32 Fr. 6 days         Chest Tube 07/13/22 1916 2 Left Pleural 32 Fr. 6 days         Chest Tube 07/13/22 1916 3 Anterior Mediastinal 32 Fr. 6 days         Chest Tube 07/13/22 1916 4 Posterior Mediastinal 32 Fr. 6 days         NG/OG Tube 07/15/22 1030 Center mouth 4 days              Airway  Duration                  Airway - Non-Surgical 07/13/22 0848 7 days               Arterial Line  Duration             Arterial Line 07/13/22 0932 Left Brachial 6 days    Arterial Line 07/13/22 2000 Right Radial 6 days              Line  Duration                  VAD 03/08/18 1124 Left ventricular assist device HeartMate II 1594 days         VAD 07/13/22 1300 Left ventricular assist device HeartMate 3 6 days              Peripheral Intravenous Line  Duration                  Midline Catheter Insertion/Assessment  - Single Lumen 06/28/22 1027 Right cephalic vein (lateral side of arm) 20g x 8cm 21 days                    Significant Labs:    CBC/Anemia Profile:  Recent Labs   Lab 07/19/22 1941 07/19/22 2014 07/20/22 0038 07/20/22  0339 07/20/22  0421 07/20/22  0738   WBC 15.75*  --  16.52*  --  17.18*  --    HGB 7.7*  --  7.8*  --  7.5*  --    HCT 24.6*   < > 25.5* 22* 24.6* 24*   *  --  148*  --  153  --    MCV 92  --  96  --  93  --    RDW 17.4*  --  18.0*  --  18.3*  --     < > = values in this interval not displayed.        Chemistries:  Recent Labs   Lab 07/19/22 1941 07/20/22 0038 07/20/22 0421   * 150* 149*   K 4.7 5.0 4.5   * 119* 117*   CO2 22* 20* 21*   BUN 66* 68* 66*   CREATININE 2.3* 2.5* 2.5*   CALCIUM 9.3 9.1 9.3   ALBUMIN 1.9* 2.0* 2.0*  2.0*   PROT 5.7* 5.8* 5.9*  5.9*   BILITOT 12.4* 12.9* 13.9*  13.9*   ALKPHOS 121 121 124  124   ALT 48* 49* 50*  50*   AST 71* 78* 77*  77*   MG 2.7* 2.9* 3.1*   PHOS 4.0 3.3 4.5       ABGs:   Recent Labs   Lab 07/20/22  0738   PH 7.346*   PCO2 41.7   HCO3 22.8*   POCSATURATED 98   BE -3     Coagulation:   Recent Labs   Lab 07/20/22  0421   INR 1.2   APTT 29.4     Lactic Acid:   Recent Labs   Lab 07/20/22  0038   LACTATE 1.1     All pertinent labs within the past 24 hours have been reviewed.    Significant Imaging:  I have reviewed all pertinent imaging results/findings within the past 24 hours.    Assessment/Plan:     Chronic combined systolic and diastolic heart failure  Tim Richards is a 56 y.o. male  HFrEF with an EF of 10% and a history of HM2 LVAD in 2018 who is now s/p HM3 upgrade, initiation of V-A ECMO after failure to wean off bypass x2      Neuro/Psych:   -- Sedation: Precedex.  -- Pain: PRN Dilaudid             Cards:   -- S/P LVAD exchange on 7/14/22  --Improving pressor requirements, consider weaning vaso   Epi 0.08   Levo off   Vaso 0.04   Dopamine off   Giapreza off  -- Stress dose steroids complete  -- Ventricular pacing wires.  -- MAP goal 75  -- VAD  flows stable  -- Decannulated on 7/19  -- Sternal closure on 7/15      Pulm:   -- Goal O2 sat > 90%  -- ABG PRN  -- Mediastinal chest tubes x2 and pleural chest tube x2 to suction  -- Nitric at 10 ppm  -- Bronch today for thick secretions      Renal:  -- Keep sun for strict I/O  -- Trend BUN/Cr 66/2.5 <-- 65/2.5 <-- 75/3.3 <-- 77/4.1 <--55/4.2 <-- 31/3.5 <-- 20/2.4  -- Lasix drip at 10        FEN / GI:   -- Replace lytes as needed  --  mL q4  -- Nutrition: NPO, trickle TF  -- GI ppx: PPI  -- Bowel reg: miralax, docusate, lactulose, go lytely      ID:   -- Tm: afebrile; WBC stable  -- ABX complete      Heme/Onc:   -- H/H stable   -- Daily CBC  -- Received 18 pRBCs, 16 FFP, 2 plt, 25 cryo; 1500 albumin intra-op  -- No product requirement since sternal closure  -- Heparin gtt at 800 advance to 1600  -- Start warfarin at 3 tonight      Endo:   -- BG goal 140-180  -- Insulin gtt  -- Levothyroxine      PPx:   Feeding: NPO, trickle TF  Analgesia/Sedation: Precedex / PRN Dilaudid  Thromboembolic prevention: SCDs, heparin gtt  HOB >30: Yes  Stress Ulcer ppx: PPI  Glucose control: Critical care goal 140-180 g/dl, ISS     Lines/Drains/Airway: ETT; OG;  RIJ trialysis, LIJ CVC and gertrude, r-radial a-line, Chest tube x4; sun; WV, VAD; midline    Remove brachial a line, RIJ trialysis     Dispo/Code Status/Palliative:   -- SICU / Full Code.            Dang Amezcua MD  Critical Care - Surgery  [facility]

## 2022-07-20 NOTE — ASSESSMENT & PLAN NOTE
The patient presented with COVID and found to have an uptrending LDH with increased power.  He underwent HM III upgrade on 7/13/22  -Daily LDH   -Continue Heparin drip    Procedure: Device Interrogation Including analysis of device parameters  Current Settings: Ventricular Assist Device    TXP LVAD INTERROGATIONS 7/20/2022 7/20/2022 7/20/2022 7/20/2022 7/20/2022 7/20/2022 7/20/2022   Type HeartMate3 HeartMate3 HeartMate3 HeartMate3 HeartMate3 HeartMate3 HeartMate3   Flow 5.0 5.0 5.0 4.9 5.1 5.1 5.2   Speed 6250 6250 6250 6250 6200 6200 6200   PI 3.5 3.5 3.7 3.6 3.2 3.3 3.2   Power (Jackson) 5.0 5.0 5.0 5.0 5.1 5.1 5.1   LSL - - - - - - -   Pulsatility - - - - - - -

## 2022-07-20 NOTE — PROGRESS NOTES
Update    LCSW came to visit Pt and caregiver, Pt is intubated and caregiver is not present. LCSW rounded with bedside RN. LCSW called PT's wife Mrs. Mendoza who states she is doing well and doesn't have any needs at this time. LCSW offered support and encouragement. SW providing ongoing psychosocial, counseling, & emotional support, education, resources, assistance, and discharge planning as indicated.  SW to continue to follow.

## 2022-07-20 NOTE — PROGRESS NOTES
"[facility]  Nephrology  Progress Note    Patient Name: Tim Richards  MRN: [id number]  Admission Date: 6/27/2022  Hospital Length of Stay: 23 days  Attending Provider: Yg Kaufman MD   Primary Care Physician: Deyanira Booth MD  Principal Problem:Left ventricular assist device (LVAD) complication    Subjective:     HPI:   Tim Richards is a 56 y.o. male w/ Known CKD 3b (without prior nephrology f/u), NICM, end-stage HF, EF 10% s/p HM2 LVAD (2018) and ICD placement (2014), ventricular fibrillation (2020), GI bleed (2019), hypothyroidism, alcohol use (2014), nicotine dependence, and [facility] amitted on 6/27/2022 for evaluation and management of decreased appetite, decreased and dark-colored urine, and increasing shortness of breath.     History obtained from EMR and family at bedside.    In the ED, pt presented with the following VS:   Initial Vitals   BP Pulse Resp Temp SpO2   06/27/22 1129 06/27/22 1038 06/27/22 1038 06/27/22 1038 06/27/22 1810   (!) 94/0 93 20 98 °F (36.7 °C) 95 %      MAP       --                Pt was found to be in acute decompensated heart failure, hypoxic, and severely volume overloaded, for which he was started on BIPAP and on lasix gtt. On 6/30 pt had ventricular tachycardia with ICD shock, this was believed to be 2/2 hypokalemia.   His LVAD was interrogated and there was a concern of LVAD thrombosis and pt was started on Integrilin,however whilke on medical management pt continue with worsening hemolysis and so CTS was consulted and recommended upgrading to HM3 and on 7/13 pt underwent HM 2 explantation, and implantation of HM 3 LVAD. Intraoperatively pt had significant vasoplegic shock and pt was placed on ECMO as well as on vasopressors and steroids for vasodilatory shock. Pt remained intubated postoperatively.   Of note, on admission pt was found to be COVID 19 + and was treated with Remdesivir.     Nephrology was consulted for: "may soon have indication " "for dialysis"  Baseline sCr: 1.9-2.3  On admission his Cr was: 2.5    On 7/13 (day of surgery) his I/Os were as follows: 20L/4.4L, net +15.6L, urine 1965mls and 2.5L from chest tube    On 7/14 to 7/15 his I/Os were: 6.4/5.5L,net 866mls, 1.6L chest tube and 4L urine         Interval History: decannulated from ECMO yesterday, had hyperthermic (tmax 104.5)  & hypotensive episodes overnight, he required increase in pressors.   Pt remains intubated FiO2 60%, heparin gtt,  and on pressors Epi & vasopressin   Remains on Lasix gtt at 10mg/hr      sNa  149->151->150->149->150 at 07 this morning   Mg->2.9->3.1 (pt received Mg citrate)   T.bili 12->10.3     sCr 4.1-->3.3->2.5 this morning     I/O: 3.6L/2.8L urine and 360ml from chest tube drains, net +146mL in the last 24 hours    Review of patient's allergies indicates:   Allergen Reactions    Lisinopril Anaphylaxis    Hydralazine analogues      Chronic constipation, impotence, dizziness     Current Facility-Administered Medications   Medication Frequency    0.9%  NaCl infusion (for blood administration) Q24H PRN    0.9%  NaCl infusion PRN    acetaminophen oral solution 999.0148 mg Q6H PRN    dexmedetomidine (PRECEDEX) 400mcg/100mL 0.9% NaCL infusion Continuous    dextrose 10% bolus 125 mL PRN    dextrose 10% bolus 250 mL PRN    docusate 50 mg/5 mL liquid 100 mg BID    EPINEPHrine (ADRENALIN) 10 mg in dextrose 5 % 250 mL infusion Continuous    furosemide (LASIX) 200 mg in dextrose 5 % 100 mL continuous infusion (conc: 2 mg/mL) Continuous    heparin 25,000 units in dextrose 5% 250 mL (100 units/mL) infusion (heparin infusion - NO NOMOGRAM) Continuous    HYDROmorphone injection 0.5 mg Q4H PRN    HYDROmorphone injection 1 mg Q4H PRN    levothyroxine tablet 112 mcg Before breakfast    magnesium citrate solution 148 mL Once    nitric oxide gas Gas 10 ppm Continuous    NORepinephrine 8 mg in dextrose 5% 250 mL infusion Continuous    pantoprazole injection 40 mg " BID    polyethylene glycol packet 17 g Daily    vasopressin (PITRESSIN) 1 Units/mL in dextrose 5 % 100 mL infusion Continuous       Objective:     Vital Signs (Most Recent):  Temp: 96.62 °F (35.9 °C) (07/20/22 0746)  Pulse: 92 (07/20/22 0746)  Resp: (!) 32 (07/20/22 0452)  BP: (!) 80/0 (07/20/22 0300)  SpO2: 95 % (ABG) (07/20/22 0400)  O2 Device (Oxygen Therapy): ventilator (07/20/22 0701)   Vital Signs (24h Range):  Temp:  [95.72 °F (35.4 °C)-104.54 °F (40.3 °C)] 96.62 °F (35.9 °C)  Pulse:  [] 92  Resp:  [21-40] 32  SpO2:  [83 %-98 %] 95 %  BP: (70-80)/(0) 80/0  Arterial Line BP: (64-92)/(51-76) 84/74     Weight: 104.5 kg (230 lb 6.1 oz) (07/20/22 0500)  Body mass index is 30.4 kg/m².  Body surface area is 2.32 meters squared.    I/O last 3 completed shifts:  In: 4804.6 [I.V.:2164.5; NG/GT:2435; IV Piggyback:205.1]  Out: 4770 [Urine:4070; Drains:250; Chest Tube:450]    Physical Exam  Vitals and nursing note reviewed.   Constitutional:       General: He is in acute distress.      Appearance: He is ill-appearing. He is not toxic-appearing or diaphoretic.   HENT:      Mouth/Throat:      Mouth: Mucous membranes are moist.   Eyes:      General: No scleral icterus.  Cardiovascular:      Rate and Rhythm: Normal rate and regular rhythm.      Pulses: Normal pulses.      Heart sounds: Murmur heard.      Comments: Intubated   + R IJ Trialysis catheter   Pulmonary:      Effort: Pulmonary effort is normal. No respiratory distress.      Breath sounds: Normal breath sounds. No stridor. No wheezing, rhonchi or rales.      Comments: Intubated  Chest tube   Abdominal:      General: There is no distension.      Palpations: There is no mass.   Genitourinary:     Comments: Dark yellow colored urine in sun collection bag   Musculoskeletal:         General: Swelling present. No tenderness, deformity or signs of injury.      Right lower leg: Edema present.      Left lower leg: Edema present.   Skin:     General: Skin is warm.       Capillary Refill: Capillary refill takes 2 to 3 seconds.      Coloration: Skin is pale. Skin is not jaundiced.      Findings: No bruising, erythema, lesion or rash.       Significant Labs:  ABGs:   Recent Labs   Lab 07/20/22 0738   PH 7.346*   PCO2 41.7   HCO3 22.8*   POCSATURATED 98   BE -3       CBC:   Recent Labs   Lab 07/20/22 0421 07/20/22 0738   WBC 17.18*  --    RBC 2.66*  --    HGB 7.5*  --    HCT 24.6* 24*     --    MCV 93  --    MCH 28.2  --    MCHC 30.5*  --        CMP:   Recent Labs   Lab 07/20/22 0421   *   CALCIUM 9.3   ALBUMIN 2.0*  2.0*   PROT 5.9*  5.9*   *   K 4.5   CO2 21*   *   BUN 66*   CREATININE 2.5*   ALKPHOS 124  124   ALT 50*  50*   AST 77*  77*   BILITOT 13.9*  13.9*       LFTs:   Recent Labs   Lab 07/20/22 0421   ALT 50*  50*   AST 77*  77*   ALKPHOS 124  124   BILITOT 13.9*  13.9*   PROT 5.9*  5.9*   ALBUMIN 2.0*  2.0*       All labs within the past 24 hours have been reviewed.     Significant Imaging:  Labs: Reviewed  X-Ray: Reviewed    Assessment/Plan:     WAGNER (acute kidney injury)  -Ischemic ATN 2/2 decrease perfusion severe hypotension in a patient with known CKD 3b  Baseline sCr: 1.9-2.3  On admission his Cr was: 2.5  Lab Results   Component Value Date    CREATININE 2.5 (H) 07/20/2022     7/15 urine microscopy showed: many granular casts, muddy brown casts, waxy casts, some WBCS, some yeast, and occasional RBCs    Recommendations:   -would hold diuretics today as pt has severe WAGNER with multiple electrolyte abnormalities (hyperNa, HyperCalcemia, HyperMag) and he is being started on vancomycin today  -sNa persistently >145->sNa 151, see hyperNa  -corrected Ca 10.9, maybe d/t volume contraction   -Electrolytes: K 4.2, renal diet, page nephrology if K >5.5    Phos level 4.5, no phos binders needed at this time    Mg 2.7->3.1, goal <2.5  -Acid/base: AGMA 2/2 ATN  -Fluid balance: pt is 3rd spacing as his albumin is 2   -Anemia: Hgb 7.5, send  anemia panel labs, transfuse for Hgb <7.0  -Strict I/O's and daily weights  -Renal function panel and Mg levels Q8H   -Renal ultrasound reviewed: no hydronephrosis and minimal CKD changes  -Maintain MAP >65 for renal perfusion    There is no immediate indication for KRT at this time      Hypernatremia   149->151->150->149->150 at 07 this morning   Likely d/t diuretics and not sufficient free water      Recommendations:  -would hold diuretics and provide at least scheduled free water flushes of 400ml Q6H x24 hours   -avoid D5 IV solutions for IVPBs if possible       Dilated cardiomyopathy  S/p ICD placement and 7/13 pt underwent HM 2 explantation, and implantation of HM 3 LVAD.  -Plan per primary team       Heart replaced by heart assist device  7/13 pt underwent HM 2 explantation, and implantation of HM 3 LVAD  -Plan per primary team       Leukocytosis  Lab Results   Component Value Date    WBC 17.18 (H) 07/20/2022     -on vanc as of 7/20 monitor vanc levels closely   -Plan per primary team       LVAD (left ventricular assist device) present  -Plan per primary team       Chronic combined systolic and diastolic heart failure  -would hold diuretics today  -Plan per primary team           Thank you for your consult. I will follow-up with patient. Please contact us if you have any additional questions.    Holly Spangler MD  Nephrology  [facility]

## 2022-07-20 NOTE — PROGRESS NOTES
"   ECMO Initiation Note  Date:  7/13/22   Cannulating Physician: Shae   Perfusionist: Estephania Martinez   ECMO Specialist    Location of initiation in hospital: OR 10   Mode: VA/VV/VAV/other? VA   ECPR? (CPR during cannulation) No   Time on support: 1950        Cannulation:  Arterial / Return cannula size: 18fr   Insertion site: RFA   Insertion depth: 9.5 cm   : OPTI 18   Ref #:    Lot # / exp date:        Venous / Drainage cannula size: 27 fr   Insertion site: RFV   Insertion depth: 5.5 cm   : [employer]   Ref #: [id number]   Lot # / exp date:           Circuit details:  Oxygenator : Maquet   Lot # / exp date: [id number]/ 2023/12/08   Oxygenator model: Quadrox iD   Circuit size: 3/8"   Circuit : Terum[employer]   Lot # / exp date: [id number]/ 2023/01/31   Pump : [employer]   Lot # / exp date: [id number]/ 2024/09/13   Pump size: Centrimag   Prime solution lot # / exp date: Isolyte -    Blood prime?    Blood product type and unit ID #:    Heating system: Apprema   Heating system S/N: [id number]         Initiation checklist:   Patient:  X Patient identity confirmed, procedure confirmed   X Blood type confirmed   X Anticoagulation method discussed and administered as directed per surgeon    X Cannulation method confirmed and cannulas handed to field       Utilities:  X Components of ECMO circuit checked for package integrity/expiration    X Power connections secure, power on, backup battery charged    X Source of gas(es) confirmed and hoses leak-free   X Gas lines and filter connections secure   X Flowmeters/ functional   X LAUDA / Gentherm connected, water to fill ze, functioning, and lines unobstructed    X LAUDA / Gentherm temperature(s) set to appropriate parameters       Accessories:  X All accessories mounted, holders secure   X Oxygenator water tested for leaks   X Alarms set (Low/High flow & Low/High pressure)   X Flowmeter in correct direction, " placement checked, and operational    X CDI/Biotrend on and connected    X Flash light available   X Tubing clamps (8) available   X Emergency supplies available (Back up circuit)   X ECMO cart stocked      Circuit:  X Arterial pump flow direction checked, head rotation smooth and quiet   X Pressure lines zeroed    X All stopcocks and tubing connections securely connected and tie banded as necessary    X All vented caps removed and replaced with non-vented caps   X Tubing direction traced and checked for kinks   X Circuit primed and de-aired (including transducers, stopcocks, and pigtails)   X Cannula connections air free      Emergent initiation:(only used if above list cannot be followed due to      worsening clinical condition of patient)   Emergent initiation: circuit air free; gas flow confirmed; anticoagulation discussed; vent setting discussed

## 2022-07-20 NOTE — ASSESSMENT & PLAN NOTE
149->151->150->149->150 at 07 this morning   Likely d/t diuretics and not sufficient free water      Recommendations:  -would hold diuretics and provide at least scheduled free water flushes of 400ml Q6H x24 hours   -avoid D5 IV solutions for IVPBs if possible

## 2022-07-20 NOTE — ASSESSMENT & PLAN NOTE
-Ischemic ATN 2/2 decrease perfusion severe hypotension in a patient with known CKD 3b  Baseline sCr: 1.9-2.3  On admission his Cr was: 2.5  Lab Results   Component Value Date    CREATININE 2.5 (H) 07/20/2022     7/15 urine microscopy showed: many granular casts, muddy brown casts, waxy casts, some WBCS, some yeast, and occasional RBCs    Recommendations:   -would hold diuretics today as pt has severe WAGNER with multiple electrolyte abnormalities (hyperNa, HyperCalcemia, HyperMag) and he is being started on vancomycin today  -sNa persistently >145->sNa 151, see hyperNa  -corrected Ca 10.9, maybe d/t volume contraction   -Electrolytes: K 4.2, renal diet, page nephrology if K >5.5    Phos level 4.5, no phos binders needed at this time    Mg 2.7->3.1, goal <2.5  -Acid/base: AGMA 2/2 ATN  -Fluid balance: pt is 3rd spacing as his albumin is 2   -Anemia: Hgb 7.5, send anemia panel labs, transfuse for Hgb <7.0  -Strict I/O's and daily weights  -Renal function panel and Mg levels Q8H   -Renal ultrasound reviewed: no hydronephrosis and minimal CKD changes  -Maintain MAP >65 for renal perfusion    There is no immediate indication for KRT at this time

## 2022-07-20 NOTE — PLAN OF CARE
Patient hyperthermic overnight Tmax 104.5, tachycardic 130-140s, tachypneic breathing in the 40s, and increasing pressor requirements. Tylenol given and cooling blanket applied. Normothermia achieved. Patient off of Levo this AM.       VSS this morning. HR 80-90s. Maintaining MAP 75-85. Sats 94-97% on ventilator. CVP 13, 13, 10.     Ventilator: AC/VC 60% fiO2 / 7 PEEP   Marilia 10ppm.    Neuro: PERRLA, follows commands, moves all extremities.     HM3 speed 6200. LSL 5800. See flowsheets for VAD numbers. No alarms. HCT adjusted.     Gtts:   Lasix 1 mg/hr   Epinephrine 0.08 mcg/kg/min   Heparin 800 units/hr   Vasopressin 0.04 units/min   precedex 1.4 mcg/kg/hr  Norepinephrine gtt titrated off.    [place]un[place] in place with adequate UOP. See flowsheets.     Chest tubes to wall suction with minimal SS output. See flowsheets.     Skin: CHG bath, gown/linen/electrodes changed. Foams, SCDs, and heel boots on. Turned q2h to prevent breakdown. No breakdown noted to sacrum, heels, or elbows. Bed in low position and locked.     All labs reviewed. Plan of care discussed with provider.

## 2022-07-20 NOTE — ASSESSMENT & PLAN NOTE
Tim Richards is a 56 y.o. male HFrEF with an EF of 10% and a history of HM2 LVAD in 2018 who is now s/p HM3 upgrade, initiation of V-A ECMO after failure to wean off bypass x2      Neuro/Psych:   -- Sedation: Precedex.  -- Pain: PRN Dilaudid             Cards:   -- S/P LVAD exchange on 7/14/22  --Improving pressor requirements, consider weaning vaso   Epi 0.08   Levo off   Vaso 0.04   Dopamine off   Giapreza off  -- Stress dose steroids complete  -- Ventricular pacing wires.  -- MAP goal 75  -- VAD  flows stable  -- Decannulated on 7/19  -- Sternal closure on 7/15      Pulm:   -- Goal O2 sat > 90%  -- ABG PRN  -- Mediastinal chest tubes x2 and pleural chest tube x2 to suction  -- Nitric at 10 ppm  -- Bronch today for thick secretions      Renal:  -- Keep sun for strict I/O  -- Trend BUN/Cr 66/2.5 <-- 65/2.5 <-- 75/3.3 <-- 77/4.1 <--55/4.2 <-- 31/3.5 <-- 20/2.4  -- Lasix drip at 10        FEN / GI:   -- Replace lytes as needed  --  mL q4  -- Nutrition: NPO, trickle TF  -- GI ppx: PPI  -- Bowel reg: miralax, docusate, lactulose, go lytely      ID:   -- Tm: afebrile; WBC stable  -- ABX complete      Heme/Onc:   -- H/H stable   -- Daily CBC  -- Received 18 pRBCs, 16 FFP, 2 plt, 25 cryo; 1500 albumin intra-op  -- No product requirement since sternal closure  -- Heparin gtt at 800 advance to 1600  -- Start warfarin at 3 tonight      Endo:   -- BG goal 140-180  -- Insulin gtt  -- Levothyroxine      PPx:   Feeding: NPO, trickle TF  Analgesia/Sedation: Precedex / PRN Dilaudid  Thromboembolic prevention: SCDs, heparin gtt  HOB >30: Yes  Stress Ulcer ppx: PPI  Glucose control: Critical care goal 140-180 g/dl, ISS     Lines/Drains/Airway: ETT; OG;  RIJ trialysis, LIJ CVC and gertrude, r-radial a-line, Chest tube x4; sun; WV, VAD; midline    Remove brachial a line, RIJ trialysis     Dispo/Code Status/Palliative:   -- SICU / Full Code.

## 2022-07-20 NOTE — ASSESSMENT & PLAN NOTE
Procedure: Device Interrogation Including analysis of device parameters  Current Settings: Ventricular Assist Device  Review of device function is stable    TXP LVAD INTERROGATIONS 7/20/2022 7/20/2022 7/20/2022 7/20/2022 7/20/2022 7/20/2022 7/20/2022   Type HeartMate3 HeartMate3 HeartMate3 HeartMate3 HeartMate3 HeartMate3 HeartMate3   Flow 5.0 5.0 5.0 4.9 5.1 5.1 5.2   Speed 6250 6250 6250 6250 6200 6200 6200   PI 3.5 3.5 3.7 3.6 3.2 3.3 3.2   Power (Jackson) 5.0 5.0 5.0 5.0 5.1 5.1 5.1   LSL - - - - - - -   Pulsatility - - - - - - -

## 2022-07-20 NOTE — SUBJECTIVE & OBJECTIVE
Interval History: decannulated from ECMO yesterday, had hypothermic & hypotensive episodes overnight, he required increase in pressors.   Pt remains intubated FiO2 60%, heparin gtt,  and on pressors Epi & vasopressin   Remains on Lasix gtt at 10mg/hr      sNa  149->151->150->149->150 at 07 this morning   Mg->2.9->3.1 (pt received Mg citrate)   T.bili 12->10.3     sCr 4.1-->3.3->2.5 this morning     I/O: 3.6L/2.8L urine and 360ml from chest tube drains, net +146mL in the last 24 hours    Review of patient's allergies indicates:   Allergen Reactions    Lisinopril Anaphylaxis    Hydralazine analogues      Chronic constipation, impotence, dizziness     Current Facility-Administered Medications   Medication Frequency    0.9%  NaCl infusion (for blood administration) Q24H PRN    0.9%  NaCl infusion PRN    acetaminophen oral solution 999.0148 mg Q6H PRN    dexmedetomidine (PRECEDEX) 400mcg/100mL 0.9% NaCL infusion Continuous    dextrose 10% bolus 125 mL PRN    dextrose 10% bolus 250 mL PRN    docusate 50 mg/5 mL liquid 100 mg BID    EPINEPHrine (ADRENALIN) 10 mg in dextrose 5 % 250 mL infusion Continuous    furosemide (LASIX) 200 mg in dextrose 5 % 100 mL continuous infusion (conc: 2 mg/mL) Continuous    heparin 25,000 units in dextrose 5% 250 mL (100 units/mL) infusion (heparin infusion - NO NOMOGRAM) Continuous    HYDROmorphone injection 0.5 mg Q4H PRN    HYDROmorphone injection 1 mg Q4H PRN    levothyroxine tablet 112 mcg Before breakfast    magnesium citrate solution 148 mL Once    nitric oxide gas Gas 10 ppm Continuous    NORepinephrine 8 mg in dextrose 5% 250 mL infusion Continuous    pantoprazole injection 40 mg BID    polyethylene glycol packet 17 g Daily    vasopressin (PITRESSIN) 1 Units/mL in dextrose 5 % 100 mL infusion Continuous       Objective:     Vital Signs (Most Recent):  Temp: 96.62 °F (35.9 °C) (07/20/22 0746)  Pulse: 92 (07/20/22 0746)  Resp: (!) 32 (07/20/22 0452)  BP: (!) 80/0 (07/20/22 0300)  SpO2:  95 % (ABG) (07/20/22 0400)  O2 Device (Oxygen Therapy): ventilator (07/20/22 0701)   Vital Signs (24h Range):  Temp:  [95.72 °F (35.4 °C)-104.54 °F (40.3 °C)] 96.62 °F (35.9 °C)  Pulse:  [] 92  Resp:  [21-40] 32  SpO2:  [83 %-98 %] 95 %  BP: (70-80)/(0) 80/0  Arterial Line BP: (64-92)/(51-76) 84/74     Weight: 104.5 kg (230 lb 6.1 oz) (07/20/22 0500)  Body mass index is 30.4 kg/m².  Body surface area is 2.32 meters squared.    I/O last 3 completed shifts:  In: 4804.6 [I.V.:2164.5; NG/GT:2435; IV Piggyback:205.1]  Out: 4770 [Urine:4070; Drains:250; Chest Tube:450]    Physical Exam  Vitals and nursing note reviewed.   Constitutional:       General: He is in acute distress.      Appearance: He is ill-appearing. He is not toxic-appearing or diaphoretic.   HENT:      Mouth/Throat:      Mouth: Mucous membranes are moist.   Eyes:      General: No scleral icterus.  Cardiovascular:      Rate and Rhythm: Normal rate and regular rhythm.      Pulses: Normal pulses.      Heart sounds: Murmur heard.      Comments: Intubated   + R IJ Trialysis catheter   Pulmonary:      Effort: Pulmonary effort is normal. No respiratory distress.      Breath sounds: Normal breath sounds. No stridor. No wheezing, rhonchi or rales.      Comments: Intubated  Chest tube   Abdominal:      General: There is no distension.      Palpations: There is no mass.   Genitourinary:     Comments: Dark yellow colored urine in sun collection bag   Musculoskeletal:         General: Swelling present. No tenderness, deformity or signs of injury.      Right lower leg: Edema present.      Left lower leg: Edema present.   Skin:     General: Skin is warm.      Capillary Refill: Capillary refill takes 2 to 3 seconds.      Coloration: Skin is pale. Skin is not jaundiced.      Findings: No bruising, erythema, lesion or rash.       Significant Labs:  ABGs:   Recent Labs   Lab 07/20/22  0738   PH 7.346*   PCO2 41.7   HCO3 22.8*   POCSATURATED 98   BE -3       CBC:    Recent Labs   Lab 07/20/22 0421 07/20/22  0738   WBC 17.18*  --    RBC 2.66*  --    HGB 7.5*  --    HCT 24.6* 24*     --    MCV 93  --    MCH 28.2  --    MCHC 30.5*  --        CMP:   Recent Labs   Lab 07/20/22 0421   *   CALCIUM 9.3   ALBUMIN 2.0*  2.0*   PROT 5.9*  5.9*   *   K 4.5   CO2 21*   *   BUN 66*   CREATININE 2.5*   ALKPHOS 124  124   ALT 50*  50*   AST 77*  77*   BILITOT 13.9*  13.9*       LFTs:   Recent Labs   Lab 07/20/22 0421   ALT 50*  50*   AST 77*  77*   ALKPHOS 124  124   BILITOT 13.9*  13.9*   PROT 5.9*  5.9*   ALBUMIN 2.0*  2.0*       All labs within the past 24 hours have been reviewed.     Significant Imaging:  Labs: Reviewed  X-Ray: Reviewed

## 2022-07-20 NOTE — NURSING
Rounded on patient. Pt awake and alert while on vent CPAP setting. RN and wife at bedside. VAD parameters WNL. Pt successfully decannulated from ECMO on yesterday. BCs being drawn today as patient was febrile overnight to 104. Off of Levo at this time. Continues on Vaso, epi, lasix, heparin and precedex.     Emotional support provided for wife. She is going to knee surgery on this Friday, 6/22, at [facility].     VAD Coordinator remains available.

## 2022-07-20 NOTE — PROGRESS NOTES
[facility]  Heart Transplant  Progress Note    Patient Name: Tim Richards  MRN: [id number]  Admission Date: 6/27/2022  Hospital Length of Stay: 23 days  Attending Physician: Yg Kaufman MD  Primary Care Provider: Deyanira Booth MD  Principal Problem:Left ventricular assist device (LVAD) complication    Subjective:     Interval History:   The patient underwent decanulation yesterday afternoon.    The patient was hypothermic and hyperthermic overnight: plan for bronch today and was started on antibiotics    Currently gtts:  Lasix at 1  Epi at 0.08  Vasopressin at 0.04  Precedex at 0.08    Continuous Infusions:   dexmedetomidine (PRECEDEX) infusion 1.4 mcg/kg/hr (07/20/22 0600)    EPINEPHrine 0.08 mcg/kg/min (07/20/22 0600)    furosemide (LASIX) 2 mg/mL continuous infusion (non-titrating) 1 mg/hr (07/20/22 0600)    heparin (porcine) in 5 % dex      nitric oxide gas      NORepinephrine bitartrate-D5W Stopped (07/20/22 0354)    vasopressin 0.04 Units/min (07/20/22 0600)     Scheduled Meds:   acetylcysteine 100 mg/ml (10%)  4 mL Nebulization TID WAKE    lactulose  20 g Per NG tube TID    levothyroxine  112 mcg Per OG tube Before breakfast    magnesium citrate  148 mL Oral Once    pantoprazole  40 mg Intravenous BID    piperacillin-tazobactam (ZOSYN) IVPB  4.5 g Intravenous Q8H    polyethylene glycol  4,000 mL Oral Once    polyethylene glycol  17 g Per NG tube Daily    vancomycin (VANCOCIN) IVPB  1,250 mg Intravenous Once    warfarin  3 mg Oral Daily     PRN Meds:sodium chloride, sodium chloride 0.9%, acetaminophen, dextrose 10%, dextrose 10%, HYDROmorphone, HYDROmorphone, Pharmacy to dose Vancomycin consult **AND** vancomycin - pharmacy to dose    Review of patient's allergies indicates:   Allergen Reactions    Lisinopril Anaphylaxis    Hydralazine analogues      Chronic constipation, impotence, dizziness     Objective:     Vital Signs (Most Recent):  Temp: 97.7 °F  (36.5 °C) (07/20/22 0910)  Pulse: 96 (07/20/22 0910)  Resp: (!) 32 (07/20/22 0452)  BP: (!) 80/0 (07/20/22 0300)  SpO2: 95 % (ABG) (07/20/22 0400)   Vital Signs (24h Range):  Temp:  [95.72 °F (35.4 °C)-104.54 °F (40.3 °C)] 97.7 °F (36.5 °C)  Pulse:  [] 96  Resp:  [21-40] 32  SpO2:  [83 %-98 %] 95 %  BP: (70-80)/(0) 80/0  Arterial Line BP: (64-92)/(51-76) 84/74     Patient Vitals for the past 72 hrs (Last 3 readings):   Weight   07/20/22 0500 104.5 kg (230 lb 6.1 oz)   07/19/22 0500 108.9 kg (240 lb)   07/18/22 0639 111.6 kg (246 lb)     Body mass index is 30.4 kg/m².      Intake/Output Summary (Last 24 hours) at 7/20/2022 0923  Last data filed at 7/20/2022 0600  Gross per 24 hour   Intake 3390.6 ml   Output 3070 ml   Net 320.6 ml       Hemodynamic Parameters:       Telemetry: NSVT x 7 beats    Physical Exam  Constitutional:       Appearance: He is obese.      Comments: Intubated, sedated   HENT:      Head: Normocephalic.      Nose: Nose normal.      Mouth/Throat:      Mouth: Mucous membranes are moist.   Eyes:      General: Scleral icterus present.   Cardiovascular:      Rate and Rhythm: Normal rate and regular rhythm.      Comments: VAD hum appreciated  Pulses detected via doppler  Pulmonary:      Comments: Coarse mechanical breath sounds bilaterally  Abdominal:      General: Bowel sounds are normal.      Palpations: Abdomen is soft.   Musculoskeletal:      Cervical back: Neck supple.      Right lower leg: No edema.      Left lower leg: No edema.   Skin:     General: Skin is warm.      Coloration: Skin is jaundiced.   Neurological:      Comments: Following directions while sedated.   Psychiatric:      Comments: Sedated       Significant Labs:  CBC:  Recent Labs   Lab 07/19/22  1941 07/19/22 2014 07/20/22  0038 07/20/22  0339 07/20/22  0421 07/20/22  0738   WBC 15.75*  --  16.52*  --  17.18*  --    RBC 2.67*  --  2.65*  --  2.66*  --    HGB 7.7*  --  7.8*  --  7.5*  --    HCT 24.6*   < > 25.5* 22* 24.6* 24*    *  --  148*  --  153  --    MCV 92  --  96  --  93  --    MCH 28.8  --  29.4  --  28.2  --    MCHC 31.3*  --  30.6*  --  30.5*  --     < > = values in this interval not displayed.     BNP:  Recent Labs   Lab 07/15/22  0401 07/17/22  0359 07/20/22  0421   BNP 1,106* 955* 813*     CMP:  Recent Labs   Lab 07/19/22 1941 07/20/22  0038 07/20/22  0421   * 134* 167*   CALCIUM 9.3 9.1 9.3   ALBUMIN 1.9* 2.0* 2.0*  2.0*   PROT 5.7* 5.8* 5.9*  5.9*   * 150* 149*   K 4.7 5.0 4.5   CO2 22* 20* 21*   * 119* 117*   BUN 66* 68* 66*   CREATININE 2.3* 2.5* 2.5*   ALKPHOS 121 121 124  124   ALT 48* 49* 50*  50*   AST 71* 78* 77*  77*   BILITOT 12.4* 12.9* 13.9*  13.9*      Coagulation:   Recent Labs   Lab 07/19/22 1941 07/20/22 0038 07/20/22  0421   INR 1.2 1.2 1.2   APTT 27.9 26.2 29.4     LDH:  Recent Labs   Lab 07/18/22  0318 07/19/22  0353 07/20/22  0421   * 588* 578*     Microbiology:  Microbiology Results (last 7 days)       Procedure Component Value Units Date/Time    Blood culture [[id number]]     Order Status: No result Specimen: Blood     Blood culture [[id number]]     Order Status: No result Specimen: Blood             I have reviewed all pertinent labs within the past 24 hours.    Estimated Creatinine Clearance: 42.4 mL/min (A) (based on SCr of 2.5 mg/dL (H)).    Diagnostic Results:  I have reviewed and interpreted all pertinent imaging results/findings within the past 24 hours.    Assessment and Plan:     55 Y/O M with Hx of stage D HFrEF (EF 10%) due to NICM underwent HM2 implant 3/8/18 and exchange of outflow graft 3/9/18, Hx VT/VF s/p SICD 2014 presenting with gradual worsening shortness of breath associated with nonpleuritic, nonradiating bilateral 4/10 retrosternal chest pressure pain.  Symptoms began yesterday and have gradually worsened in the last 12 hours.  He does report going to a family picnic with increased consumption of chicken wings, ribs and other salty meat  products.  Prior to symptom onset, he reports nausea, nonbloody diarrhea began yesterday and single episode of nonbloody nonbilious vomiting this morning. He also complains of dizziness, lightheadedness, and a mild HA. SOB worsened this morning prompting him to come to the ED.     In the ED, patient was in respiratory distress requiring BiPAP. MAP 96 and started on Nitro gtt. Work-up revealed WBC 10, K 5.4, Cr 2.5 (baseline 1.9), BNP 1950 (baseline 200-300s), Bili 2.1, LDH 1058, and INR 3.2. Bedside TTE with severely reduced EF, AV not opening, septum bulging into RV. Given IV Lasix 80 mg x1 with only 100-200 mL UOP and dark in color. HM2 interrogated and flows have been 8.5-9 L/min with no alarms or power surges. Cardiology consulted in the ED, dicussed with HTS, and decision made to admit to ICU for further mgmt.       * Left ventricular assist device (LVAD) complication  The patient presented with COVID and found to have an uptrending LDH with increased power.  He underwent HM III upgrade on 7/13/22  -Daily LDH   -Continue Heparin drip    Procedure: Device Interrogation Including analysis of device parameters  Current Settings: Ventricular Assist Device    TXP LVAD INTERROGATIONS 7/20/2022 7/20/2022 7/20/2022 7/20/2022 7/20/2022 7/20/2022 7/20/2022   Type HeartMate3 HeartMate3 HeartMate3 HeartMate3 HeartMate3 HeartMate3 HeartMate3   Flow 5.0 5.0 5.0 4.9 5.1 5.1 5.2   Speed 6250 6250 6250 6250 6200 6200 6200   PI 3.5 3.5 3.7 3.6 3.2 3.3 3.2   Power (Jackson) 5.0 5.0 5.0 5.0 5.1 5.1 5.1   LSL - - - - - - -   Pulsatility - - - - - - -       Anxiety  -Currently intubated and sedated    History of ventricular tachycardia  Patient experienced an episode of VT on 6/30 and experienced an ICD shock thought to be related to hypokalemia (K of 3.1 on 6/30)  - Aggressively replete K, keep K>4 and Mg>2  - Was previously on mexiletine 25mg q8hrs and amiodarone 400mg daily  - Continue to monitor on  telemetry    COVID-19  -Previously hypoxic then recovered  -ID was consulted, recommended Remdesivir, course completed    Elevated serum lactate dehydrogenase (LDH)  Concerned for pump thrombosis  Cont to trend  Plan for pump exchange today    amiodarone induced hypothyrodism  -Continue levothyroxine 112 mcg     LVAD (left ventricular assist device) present  Procedure: Device Interrogation Including analysis of device parameters  Current Settings: Ventricular Assist Device  Review of device function is stable    TXP LVAD INTERROGATIONS 7/20/2022 7/20/2022 7/20/2022 7/20/2022 7/20/2022 7/20/2022 7/20/2022   Type HeartMate3 HeartMate3 HeartMate3 HeartMate3 HeartMate3 HeartMate3 HeartMate3   Flow 5.0 5.0 5.0 4.9 5.1 5.1 5.2   Speed 6250 6250 6250 6250 6200 6200 6200   PI 3.5 3.5 3.7 3.6 3.2 3.3 3.2   Power (Jackson) 5.0 5.0 5.0 5.0 5.1 5.1 5.1   LSL - - - - - - -   Pulsatility - - - - - - -       CKD (chronic kidney disease), stage III  WAGNER on CKD  Patient's baseline is 1.5-2.0  - Nephrology is following  - Avoiding nephrotoxic medications  - Continue to monitor  - Strict I/O's    Chronic combined systolic and diastolic heart failure  #ADHF  #NICMP  Underwent HM III upgrade on 7/13/22, currently on [facility] ECMO  Currently on Lasix gtt  Currently on Epi gtt  Currently on Vasopressin gtt  Currently on Heparin gtt  Currently on Precedex gtt  Being treated for possible sepsis  Currently intubated and sedated           Harry Canales MD  Heart Transplant  [facility]

## 2022-07-20 NOTE — SUBJECTIVE & OBJECTIVE
Interval History/Significant Events: Decannulated from ECMO yesterday. Fever and hypotension overnight with levo up to 0.12. Now hemodynamically stable on 0.08 epi and 0.04 vaso. LVAD flows stable.    Follow-up For: Procedure(s) (LRB):  CLOSURE, WOUND, STERNUM (N/A)  APPLICATION, WOUND VAC (N/A)  INSERTION, GRAFT, PERICARDIUM (N/A)  IRRIGATION, MEDIASTINUM (N/A)    Post-Operative Day: 5 Days Post-Op    Objective:     Vital Signs (Most Recent):  Temp: 97.7 °F (36.5 °C) (07/20/22 0910)  Pulse: 96 (07/20/22 0910)  Resp: (!) 32 (07/20/22 0452)  BP: (!) 80/0 (07/20/22 0300)  SpO2: 95 % (ABG) (07/20/22 0400)   Vital Signs (24h Range):  Temp:  [95.72 °F (35.4 °C)-104.54 °F (40.3 °C)] 97.7 °F (36.5 °C)  Pulse:  [] 96  Resp:  [21-40] 32  SpO2:  [83 %-98 %] 95 %  BP: (70-80)/(0) 80/0  Arterial Line BP: (64-92)/(51-76) 84/74     Weight: 104.5 kg (230 lb 6.1 oz)  Body mass index is 30.4 kg/m².      Intake/Output Summary (Last 24 hours) at 7/20/2022 0915  Last data filed at 7/20/2022 0600  Gross per 24 hour   Intake 3390.6 ml   Output 3070 ml   Net 320.6 ml       Physical Exam  Constitutional:       Comments: Intubated and sedated   HENT:      Head: Normocephalic and atraumatic.      Mouth/Throat:      Comments: OG in place  Eyes:      Pupils: Pupils are equal, round, and reactive to light.   Neck:      Comments: L IJ CVC  R IJ trialysis  Cardiovascular:      Rate and Rhythm: Regular rhythm. Tachycardia present.      Comments:   LVAD in place -- 6200 rpm, 5.0L    Midline sternotomy c/d with dressing in place    2 plerual and 2 mediastinal chest tubes to suction.    V pacing wires in place.   Pulmonary:      Comments: Mechanically ventilated  Abdominal:      General: There is no distension.      Palpations: Abdomen is soft.   Genitourinary:     Comments: Lagunas in place draining clear urine  Musculoskeletal:      Comments: ECMO cannula sites with dressing in place.     Cutdown incision with seroma when cannula removed  yesterday which has resolved.    L brachial, right radial arterial line   Skin:     General: Skin is warm and dry.   Neurological:      Comments: Following commands when sedation paused       Vents:  Vent Mode: A/C (07/20/22 0910)  Ventilator Initiated: Yes (07/15/22 1027)  Set Rate: 16 BPM (07/20/22 0910)  Vt Set: 400 mL (07/20/22 0910)  PEEP/CPAP: 7 cmH20 (07/20/22 0910)  Oxygen Concentration (%): 50 (07/20/22 0910)  Peak Airway Pressure: 38 cmH2O (07/20/22 0910)  Plateau Pressure: 21 cmH20 (07/20/22 0910)  Total Ve: 12.8 mL (07/20/22 0910)  Negative Inspiratory Force (cm H2O): 0 (07/20/22 0910)  F/VT Ratio<105 (RSBI): (!) 51.16 (07/20/22 0328)    Lines/Drains/Airways       Central Venous Catheter Line  Duration              Introducer with Double Lumen 07/13/22 0900 left internal jugular 7 days    Percutaneous Central Line Insertion/Assessment - Quad Lumen  07/13/22 0941 left internal jugular 6 days    Trialysis (Dialysis) Catheter 07/13/22 2100 right internal jugular 6 days              Drain  Duration                  Urethral Catheter 07/13/22 0848 Temperature probe;Latex 14 Fr. 7 days         Chest Tube 07/13/22 1916 1 Right Pleural 32 Fr. 6 days         Chest Tube 07/13/22 1916 2 Left Pleural 32 Fr. 6 days         Chest Tube 07/13/22 1916 3 Anterior Mediastinal 32 Fr. 6 days         Chest Tube 07/13/22 1916 4 Posterior Mediastinal 32 Fr. 6 days         NG/OG Tube 07/15/22 1030 Center mouth 4 days              Airway  Duration                  Airway - Non-Surgical 07/13/22 0848 7 days              Arterial Line  Duration             Arterial Line 07/13/22 0932 Left Brachial 6 days    Arterial Line 07/13/22 2000 Right Radial 6 days              Line  Duration                  VAD 03/08/18 1124 Left ventricular assist device HeartMate II 1594 days         VAD 07/13/22 1300 Left ventricular assist device HeartMate 3 6 days              Peripheral Intravenous Line  Duration                  Midline Catheter  Insertion/Assessment  - Single Lumen 06/28/22 1027 Right cephalic vein (lateral side of arm) 20g x 8cm 21 days                    Significant Labs:    CBC/Anemia Profile:  Recent Labs   Lab 07/19/22 1941 07/19/22 2014 07/20/22 0038 07/20/22  0339 07/20/22 0421 07/20/22  0738   WBC 15.75*  --  16.52*  --  17.18*  --    HGB 7.7*  --  7.8*  --  7.5*  --    HCT 24.6*   < > 25.5* 22* 24.6* 24*   *  --  148*  --  153  --    MCV 92  --  96  --  93  --    RDW 17.4*  --  18.0*  --  18.3*  --     < > = values in this interval not displayed.        Chemistries:  Recent Labs   Lab 07/19/22 1941 07/20/22 0038 07/20/22 0421   * 150* 149*   K 4.7 5.0 4.5   * 119* 117*   CO2 22* 20* 21*   BUN 66* 68* 66*   CREATININE 2.3* 2.5* 2.5*   CALCIUM 9.3 9.1 9.3   ALBUMIN 1.9* 2.0* 2.0*  2.0*   PROT 5.7* 5.8* 5.9*  5.9*   BILITOT 12.4* 12.9* 13.9*  13.9*   ALKPHOS 121 121 124  124   ALT 48* 49* 50*  50*   AST 71* 78* 77*  77*   MG 2.7* 2.9* 3.1*   PHOS 4.0 3.3 4.5       ABGs:   Recent Labs   Lab 07/20/22 0738   PH 7.346*   PCO2 41.7   HCO3 22.8*   POCSATURATED 98   BE -3     Coagulation:   Recent Labs   Lab 07/20/22 0421   INR 1.2   APTT 29.4     Lactic Acid:   Recent Labs   Lab 07/20/22 0038   LACTATE 1.1     All pertinent labs within the past 24 hours have been reviewed.    Significant Imaging:  I have reviewed all pertinent imaging results/findings within the past 24 hours.

## 2022-07-20 NOTE — SUBJECTIVE & OBJECTIVE
Interval History:   The patient underwent decanulation yesterday afternoon.    The patient was hypothermic and hyperthermic overnight: plan for bronch today and was started on antibiotics    Currently gtts:  Lasix at 1  Epi at 0.08  Vasopressin at 0.04  Precedex at 0.08    Continuous Infusions:   dexmedetomidine (PRECEDEX) infusion 1.4 mcg/kg/hr (07/20/22 0600)    EPINEPHrine 0.08 mcg/kg/min (07/20/22 0600)    furosemide (LASIX) 2 mg/mL continuous infusion (non-titrating) 1 mg/hr (07/20/22 0600)    heparin (porcine) in 5 % dex      nitric oxide gas      NORepinephrine bitartrate-D5W Stopped (07/20/22 0354)    vasopressin 0.04 Units/min (07/20/22 0600)     Scheduled Meds:   acetylcysteine 100 mg/ml (10%)  4 mL Nebulization TID WAKE    lactulose  20 g Per NG tube TID    levothyroxine  112 mcg Per OG tube Before breakfast    magnesium citrate  148 mL Oral Once    pantoprazole  40 mg Intravenous BID    piperacillin-tazobactam (ZOSYN) IVPB  4.5 g Intravenous Q8H    polyethylene glycol  4,000 mL Oral Once    polyethylene glycol  17 g Per NG tube Daily    vancomycin (VANCOCIN) IVPB  1,250 mg Intravenous Once    warfarin  3 mg Oral Daily     PRN Meds:sodium chloride, sodium chloride 0.9%, acetaminophen, dextrose 10%, dextrose 10%, HYDROmorphone, HYDROmorphone, Pharmacy to dose Vancomycin consult **AND** vancomycin - pharmacy to dose    Review of patient's allergies indicates:   Allergen Reactions    Lisinopril Anaphylaxis    Hydralazine analogues      Chronic constipation, impotence, dizziness     Objective:     Vital Signs (Most Recent):  Temp: 97.7 °F (36.5 °C) (07/20/22 0910)  Pulse: 96 (07/20/22 0910)  Resp: (!) 32 (07/20/22 0452)  BP: (!) 80/0 (07/20/22 0300)  SpO2: 95 % (ABG) (07/20/22 0400)   Vital Signs (24h Range):  Temp:  [95.72 °F (35.4 °C)-104.54 °F (40.3 °C)] 97.7 °F (36.5 °C)  Pulse:  [] 96  Resp:  [21-40] 32  SpO2:  [83 %-98 %] 95 %  BP: (70-80)/(0) 80/0  Arterial Line BP: (64-92)/(51-76) 84/74      Patient Vitals for the past 72 hrs (Last 3 readings):   Weight   07/20/22 0500 104.5 kg (230 lb 6.1 oz)   07/19/22 0500 108.9 kg (240 lb)   07/18/22 0639 111.6 kg (246 lb)     Body mass index is 30.4 kg/m².      Intake/Output Summary (Last 24 hours) at 7/20/2022 0923  Last data filed at 7/20/2022 0600  Gross per 24 hour   Intake 3390.6 ml   Output 3070 ml   Net 320.6 ml       Hemodynamic Parameters:       Telemetry: NSVT x 7 beats    Physical Exam  Constitutional:       Appearance: He is obese.      Comments: Intubated, sedated   HENT:      Head: Normocephalic.      Nose: Nose normal.      Mouth/Throat:      Mouth: Mucous membranes are moist.   Eyes:      General: Scleral icterus present.   Cardiovascular:      Rate and Rhythm: Normal rate and regular rhythm.      Comments: VAD hum appreciated  Pulses detected via doppler  Pulmonary:      Comments: Coarse mechanical breath sounds bilaterally  Abdominal:      General: Bowel sounds are normal.      Palpations: Abdomen is soft.   Musculoskeletal:      Cervical back: Neck supple.      Right lower leg: No edema.      Left lower leg: No edema.   Skin:     General: Skin is warm.      Coloration: Skin is jaundiced.   Neurological:      Comments: Following directions while sedated.   Psychiatric:      Comments: Sedated       Significant Labs:  CBC:  Recent Labs   Lab 07/19/22  1941 07/19/22 2014 07/20/22  0038 07/20/22  0339 07/20/22  0421 07/20/22  0738   WBC 15.75*  --  16.52*  --  17.18*  --    RBC 2.67*  --  2.65*  --  2.66*  --    HGB 7.7*  --  7.8*  --  7.5*  --    HCT 24.6*   < > 25.5* 22* 24.6* 24*   *  --  148*  --  153  --    MCV 92  --  96  --  93  --    MCH 28.8  --  29.4  --  28.2  --    MCHC 31.3*  --  30.6*  --  30.5*  --     < > = values in this interval not displayed.     BNP:  Recent Labs   Lab 07/15/22  0401 07/17/22  0359 07/20/22  0421   BNP 1,106* 955* 813*     CMP:  Recent Labs   Lab 07/19/22  1941 07/20/22  0038 07/20/22  0421   *  134* 167*   CALCIUM 9.3 9.1 9.3   ALBUMIN 1.9* 2.0* 2.0*  2.0*   PROT 5.7* 5.8* 5.9*  5.9*   * 150* 149*   K 4.7 5.0 4.5   CO2 22* 20* 21*   * 119* 117*   BUN 66* 68* 66*   CREATININE 2.3* 2.5* 2.5*   ALKPHOS 121 121 124  124   ALT 48* 49* 50*  50*   AST 71* 78* 77*  77*   BILITOT 12.4* 12.9* 13.9*  13.9*      Coagulation:   Recent Labs   Lab 07/19/22  1941 07/20/22  0038 07/20/22  0421   INR 1.2 1.2 1.2   APTT 27.9 26.2 29.4     LDH:  Recent Labs   Lab 07/18/22  0318 07/19/22  0353 07/20/22  0421   * 588* 578*     Microbiology:  Microbiology Results (last 7 days)       Procedure Component Value Units Date/Time    Blood culture [[id number]]     Order Status: No result Specimen: Blood     Blood culture [[id number]]     Order Status: No result Specimen: Blood             I have reviewed all pertinent labs within the past 24 hours.    Estimated Creatinine Clearance: 42.4 mL/min (A) (based on SCr of 2.5 mg/dL (H)).    Diagnostic Results:  I have reviewed and interpreted all pertinent imaging results/findings within the past 24 hours.

## 2022-07-20 NOTE — PROGRESS NOTES
Pharmacokinetic Initial Assessment: IV Vancomycin    Assessment/Plan:    Initiate intravenous vancomycin with one time dose of 1250 mg once with subsequent doses when random concentrations are less than 20 mcg/mL   Cr 2.5 today, close to baseline of 1.9-2.3, will pulse dose. Initial dose based on previous  given on 7/13/22 with a 24 hr level ~16.  Desired empiric serum trough concentration is 15 to 20 mcg/mL  Draw vancomycin random level on 7/21/22 at 0500 with morning labs.  Pharmacy will continue to follow and monitor vancomycin.      Please contact pharmacy at extension [phone] with any questions regarding this assessment.     Thank you for the consult,   Annmarie Saucedo       Patient brief summary:  Tim Richards is a 56 y.o. male initiated on antimicrobial therapy with IV Vancomycin for treatment of suspected bacteremia    Drug Allergies:   Review of patient's allergies indicates:   Allergen Reactions    Lisinopril Anaphylaxis    Hydralazine analogues      Chronic constipation, impotence, dizziness       Actual Body Weight:   104.6 kg    Renal Function:   Estimated Creatinine Clearance: 42.4 mL/min (A) (based on SCr of 2.5 mg/dL (H)).,     Dialysis Method (if applicable):  N/A    CBC (last 72 hours):  Recent Labs   Lab Result Units 07/17/22  1518 07/17/22  2135 07/18/22  0318 07/18/22  1237 07/18/22  1950 07/19/22  0353 07/19/22  1137 07/19/22  1941 07/20/22  0038 07/20/22  0421   WBC K/uL 19.18* 19.14* 18.08* 17.06* 16.01* 15.13* 15.62* 15.75* 16.52* 17.18*   Hemoglobin g/dL 8.7* 8.3* 8.2* 8.1* 7.9* 8.0* 8.0* 7.7* 7.8* 7.5*   Hematocrit % 26.1* 26.0* 25.4* 25.3* 25.7* 25.5* 25.8* 24.6* 25.5* 24.6*   Platelets K/uL 84* 84* 83* 95* 81* 99* 117* 132* 148* 153   Gran % % 88.4* 87.8* 86.3* 84.7* 82.7* 78.4* 78.0* 78.1* 80.4* 80.1*   Lymph % % 3.1* 3.3* 3.3* 3.2* 4.1* 4.9* 4.0* 4.9* 3.8* 5.1*   Mono % % 7.7 7.9 9.0 9.7 10.2 11.6 12.2 11.7 10.7 10.0   Eosinophil % % 0.0 0.1 0.1 0.1 0.3 0.8 0.9 0.6 0.5 0.3    Basophil % % 0.1 0.1 0.1 0.1 0.1 0.1 0.1 0.3 0.1 0.2   Differential Method  Automated Automated Automated Automated Automated Automated Automated Automated Automated Automated       Metabolic Panel (last 72 hours):  Recent Labs   Lab Result Units 07/17/22  1240 07/17/22  1518 07/17/22  2135 07/18/22  0318 07/18/22  1237 07/18/22  1950 07/19/22  0114 07/19/22  0353 07/19/22  1137 07/19/22  1941 07/20/22  0038 07/20/22  0421   Sodium mmol/L  --  145 144 145 146* 147* 147* 146* 149* 150* 150* 149*   Sodium, Urine mmol/L 119  --   --   --   --   --   --   --   --   --   --   --    Potassium mmol/L  --  3.9 4.3 3.9 4.1 3.8 3.8 4.2 4.5 4.7 5.0 4.5   Potassium, Urine mmol/L 24  --   --   --   --   --   --   --   --   --   --   --    Chloride mmol/L  --  106 107 109 113* 114* 114* 114* 118* 119* 119* 117*   CO2 mmol/L  --  26 24 23 24 24 22* 22* 23 22* 20* 21*   Glucose mg/dL  --  129* 140* 146* 140* 149* 154* 142* 134* 130* 134* 167*   BUN mg/dL  --  77* 78* 75* 74* 69* 65* 65* 63* 66* 68* 66*   Creatinine mg/dL  --  3.7* 3.5* 3.3* 3.0* 2.7* 2.5* 2.5* 2.4* 2.3* 2.5* 2.5*   Albumin g/dL  --  2.1* 2.1* 2.1* 2.1* 2.0* 2.0* 2.0* 2.0* 1.9* 2.0* 2.0*  2.0*   Total Bilirubin mg/dL  --  9.7* 10.4* 10.8* 10.4* 11.7* 11.7* 12.1* 12.6* 12.4* 12.9* 13.9*  13.9*   Alkaline Phosphatase U/L  --  104 106 108 117 113 113 117 119 121 121 124  124   AST U/L  --  141* 131* 119* 98* 85* 78* 77* 77* 71* 78* 77*  77*   ALT U/L  --  68* 65* 62* 53* 51* 50* 49* 48* 48* 49* 50*  50*   Magnesium mg/dL  --  2.3 2.3 2.3 2.4 2.7* 2.7* 2.7* 2.8* 2.7* 2.9* 3.1*   Phosphorus mg/dL  --  4.3 4.1 4.3 4.2 3.8 3.4 3.3 3.5 4.0 3.3 4.5       Drug levels (last 3 results):  No results for input(s): VANCOMYCINRA, VANCORANDOM, VANCOMYCINPE, VANCOPEAK, VANCOMYCINTR, VANCOTROUGH in the last 72 hours.    Microbiologic Results:  Microbiology Results (last 7 days)     Procedure Component Value Units Date/Time    Blood culture [[id number]]     Order Status: No  result Specimen: Blood     Blood culture [[id number]]     Order Status: No result Specimen: Blood

## 2022-07-20 NOTE — ASSESSMENT & PLAN NOTE
Lab Results   Component Value Date    WBC 17.18 (H) 07/20/2022     -on vanc as of 7/20 monitor vanc levels closely   -Plan per primary team

## 2022-07-20 NOTE — PROCEDURES
Template for Bronchoscopy with Basic Procedures    DATE OF PROCEDURE: 7/15/2022     PREOPERATIVE DIAGNOSES:   Complication involving left ventricular assist device (LVAD), subsequent encounter [T82.9XXD]  Chronic combined systolic and diastolic heart failure [I50.42]  Dilated cardiomyopathy [I42.0]    POSTOPERATIVE DIAGNOSES:   Complication involving left ventricular assist device (LVAD), subsequent encounter [T82.9XXD]  Chronic combined systolic and diastolic heart failure [I50.42]  Dilated cardiomyopathy [I42.0]    PROCEDURES PERFORMED:   Bronchoscopy    Surgeon(s) and Role:     * Shakeel Oquendo MD - Primary     * Hardeep Biswas MD - Resident - Assisting    Informed consent:  The nature of the surgery and possible benefits explained to and appreciated by the patient's wife. The multiple complications and adverse outcomes including:surgical infection, pneumonia, air leak / fluid drainage from the lung requiring prolonged chest tube drainage, intra-thoracic abscess, bleeding, lung tumor or infection/ bleeding recurrence, cardiac arrhythmias, myocardial infarction, renal failure, prolonged gastrointestinal infection or dysfunction, respiratory failure, need for tracheostomy, pulmonary embolus, thrombophlebitis, multiple organ failure, stroke, and prolonged hospital / extended care facility stay, acute and chronic pain, the need for new chronic medical care, along with other unsuspected problems,also understood and appreciated by patient who voluntarily and in an informed state of mind agreed to this surgical intervention.    Operation Detail:  After surgical time out and general understanding of the nature and laterality of the operative side was established, the patient underwent adequate anesthesia utilizing intravenous sedation and topical analgesia.     Fiberoptic Bronchoscopy was the performed introducing the bronchoscopy through the endotracheal tube. Bronchoscopy revealed thick white secretions, most  notably in the right middle and right lower lobes that were easily suctionable and sent for culture. All other lobes were inspected and suctioned as appropriate.    INTRAOPERATIVE COMPLICATIONS: none    ESTIMATED BLOOD LOSS: 0 mL    Specimens:   Bronchioalveolar lavage right middle lobe and right lower lobe

## 2022-07-21 ENCOUNTER — DOCUMENTATION ONLY (OUTPATIENT)
Dept: CARDIOLOGY | Facility: HOSPITAL | Age: 57
End: 2022-07-21
Payer: MEDICARE

## 2022-07-21 LAB
ALBUMIN SERPL BCP-MCNC: 1.8 G/DL (ref 3.5–5.2)
ALBUMIN SERPL BCP-MCNC: 1.9 G/DL (ref 3.5–5.2)
ALBUMIN SERPL BCP-MCNC: 2 G/DL (ref 3.5–5.2)
ALLENS TEST: ABNORMAL
ALLENS TEST: NORMAL
ALP SERPL-CCNC: 114 U/L (ref 55–135)
ALP SERPL-CCNC: 131 U/L (ref 55–135)
ALP SERPL-CCNC: 131 U/L (ref 55–135)
ALT SERPL W/O P-5'-P-CCNC: 41 U/L (ref 10–44)
ALT SERPL W/O P-5'-P-CCNC: 45 U/L (ref 10–44)
ALT SERPL W/O P-5'-P-CCNC: 50 U/L (ref 10–44)
ANION GAP SERPL CALC-SCNC: 10 MMOL/L (ref 8–16)
ANION GAP SERPL CALC-SCNC: 10 MMOL/L (ref 8–16)
ANION GAP SERPL CALC-SCNC: 12 MMOL/L (ref 8–16)
APTT BLDCRRT: 31.6 SEC (ref 21–32)
APTT BLDCRRT: 37 SEC (ref 21–32)
APTT BLDCRRT: 42 SEC (ref 21–32)
APTT BLDCRRT: 43.6 SEC (ref 21–32)
AST SERPL-CCNC: 57 U/L (ref 10–40)
AST SERPL-CCNC: 66 U/L (ref 10–40)
AST SERPL-CCNC: 72 U/L (ref 10–40)
BASOPHILS # BLD AUTO: 0.04 K/UL (ref 0–0.2)
BASOPHILS # BLD AUTO: 0.06 K/UL (ref 0–0.2)
BASOPHILS # BLD AUTO: 0.07 K/UL (ref 0–0.2)
BASOPHILS NFR BLD: 0.1 % (ref 0–1.9)
BASOPHILS NFR BLD: 0.2 % (ref 0–1.9)
BASOPHILS NFR BLD: 0.2 % (ref 0–1.9)
BILIRUB SERPL-MCNC: 13.1 MG/DL (ref 0.1–1)
BILIRUB SERPL-MCNC: 14.6 MG/DL (ref 0.1–1)
BILIRUB SERPL-MCNC: 15.9 MG/DL (ref 0.1–1)
BSA FOR ECHO PROCEDURE: 2.34 M2
BUN SERPL-MCNC: 52 MG/DL (ref 6–20)
BUN SERPL-MCNC: 56 MG/DL (ref 6–20)
BUN SERPL-MCNC: 60 MG/DL (ref 6–20)
CA-I BLDV-SCNC: 1.15 MMOL/L (ref 1.06–1.42)
CA-I BLDV-SCNC: 1.17 MMOL/L (ref 1.06–1.42)
CALCIUM SERPL-MCNC: 9.1 MG/DL (ref 8.7–10.5)
CALCIUM SERPL-MCNC: 9.2 MG/DL (ref 8.7–10.5)
CALCIUM SERPL-MCNC: 9.3 MG/DL (ref 8.7–10.5)
CHLORIDE SERPL-SCNC: 112 MMOL/L (ref 95–110)
CHLORIDE SERPL-SCNC: 113 MMOL/L (ref 95–110)
CHLORIDE SERPL-SCNC: 113 MMOL/L (ref 95–110)
CO2 SERPL-SCNC: 24 MMOL/L (ref 23–29)
CO2 SERPL-SCNC: 24 MMOL/L (ref 23–29)
CO2 SERPL-SCNC: 25 MMOL/L (ref 23–29)
CREAT SERPL-MCNC: 2.4 MG/DL (ref 0.5–1.4)
CREAT SERPL-MCNC: 2.7 MG/DL (ref 0.5–1.4)
CREAT SERPL-MCNC: 2.7 MG/DL (ref 0.5–1.4)
CV ECHO LV RWT: 0.15 CM
DELSYS: ABNORMAL
DELSYS: NORMAL
DIFFERENTIAL METHOD: ABNORMAL
DOP CALC LVOT AREA: 4.4 CM2
DOP CALC LVOT DIAMETER: 2.38 CM
DOP CALC LVOT PEAK VEL: 0.76 M/S
DOP CALC LVOT STROKE VOLUME: 56.38 CM3
DOP CALCLVOT PEAK VEL VTI: 12.68 CM
ECHO LV POSTERIOR WALL: 0.66 CM (ref 0.6–1.1)
EJECTION FRACTION: 10 %
EOSINOPHIL # BLD AUTO: 0 K/UL (ref 0–0.5)
EOSINOPHIL NFR BLD: 0.1 % (ref 0–8)
ERYTHROCYTE [DISTWIDTH] IN BLOOD BY AUTOMATED COUNT: 18.5 % (ref 11.5–14.5)
ERYTHROCYTE [DISTWIDTH] IN BLOOD BY AUTOMATED COUNT: 18.6 % (ref 11.5–14.5)
ERYTHROCYTE [DISTWIDTH] IN BLOOD BY AUTOMATED COUNT: 18.6 % (ref 11.5–14.5)
ERYTHROCYTE [SEDIMENTATION RATE] IN BLOOD BY WESTERGREN METHOD: 16 MM/H
EST. GFR  (AFRICAN AMERICAN): 29.3 ML/MIN/1.73 M^2
EST. GFR  (AFRICAN AMERICAN): 29.3 ML/MIN/1.73 M^2
EST. GFR  (AFRICAN AMERICAN): 33.8 ML/MIN/1.73 M^2
EST. GFR  (NON AFRICAN AMERICAN): 25.4 ML/MIN/1.73 M^2
EST. GFR  (NON AFRICAN AMERICAN): 25.4 ML/MIN/1.73 M^2
EST. GFR  (NON AFRICAN AMERICAN): 29.3 ML/MIN/1.73 M^2
FIBRINOGEN PPP-MCNC: >800 MG/DL (ref 182–400)
FIO2: 50
FRACTIONAL SHORTENING: 3 % (ref 28–44)
GLUCOSE SERPL-MCNC: 123 MG/DL (ref 70–110)
GLUCOSE SERPL-MCNC: 146 MG/DL (ref 70–110)
GLUCOSE SERPL-MCNC: 163 MG/DL (ref 70–110)
HCO3 UR-SCNC: 22.6 MMOL/L (ref 24–28)
HCO3 UR-SCNC: 24 MMOL/L (ref 24–28)
HCO3 UR-SCNC: 25.1 MMOL/L (ref 24–28)
HCO3 UR-SCNC: 26.1 MMOL/L (ref 24–28)
HCO3 UR-SCNC: 26.4 MMOL/L (ref 24–28)
HCO3 UR-SCNC: 27.1 MMOL/L (ref 24–28)
HCT VFR BLD AUTO: 23.6 % (ref 40–54)
HCT VFR BLD AUTO: 25.9 % (ref 40–54)
HCT VFR BLD AUTO: 26 % (ref 40–54)
HCT VFR BLD CALC: 22 %PCV (ref 36–54)
HCT VFR BLD CALC: 23 %PCV (ref 36–54)
HCT VFR BLD CALC: 24 %PCV (ref 36–54)
HCT VFR BLD CALC: 25 %PCV (ref 36–54)
HCT VFR BLD CALC: 25 %PCV (ref 36–54)
HCT VFR BLD CALC: 26 %PCV (ref 36–54)
HGB BLD-MCNC: 7.3 G/DL (ref 14–18)
HGB BLD-MCNC: 7.8 G/DL (ref 14–18)
HGB BLD-MCNC: 7.9 G/DL (ref 14–18)
IMM GRANULOCYTES # BLD AUTO: 0.77 K/UL (ref 0–0.04)
IMM GRANULOCYTES # BLD AUTO: 0.8 K/UL (ref 0–0.04)
IMM GRANULOCYTES # BLD AUTO: 0.92 K/UL (ref 0–0.04)
IMM GRANULOCYTES NFR BLD AUTO: 2.5 % (ref 0–0.5)
IMM GRANULOCYTES NFR BLD AUTO: 2.5 % (ref 0–0.5)
IMM GRANULOCYTES NFR BLD AUTO: 2.7 % (ref 0–0.5)
INR PPP: 1.4 (ref 0.8–1.2)
INR PPP: 1.5 (ref 0.8–1.2)
INR PPP: 1.8 (ref 0.8–1.2)
INTERVENTRICULAR SEPTUM: 0.72 CM (ref 0.6–1.1)
LA MAJOR: 6.58 CM
LA MINOR: 6.28 CM
LA WIDTH: 3.44 CM
LACTATE SERPL-SCNC: 0.8 MMOL/L (ref 0.5–2.2)
LDH SERPL L TO P-CCNC: 0.83 MMOL/L (ref 0.36–1.25)
LDH SERPL L TO P-CCNC: 0.93 MMOL/L (ref 0.36–1.25)
LDH SERPL L TO P-CCNC: 1.04 MMOL/L (ref 0.36–1.25)
LDH SERPL L TO P-CCNC: 1.09 MMOL/L (ref 0.36–1.25)
LDH SERPL L TO P-CCNC: 1.17 MMOL/L (ref 0.36–1.25)
LDH SERPL L TO P-CCNC: 1.43 MMOL/L (ref 0.36–1.25)
LDH SERPL L TO P-CCNC: 783 U/L (ref 110–260)
LEFT ATRIUM SIZE: 4.83 CM
LEFT ATRIUM VOLUME INDEX MOD: 60.9 ML/M2
LEFT ATRIUM VOLUME INDEX: 39.5 ML/M2
LEFT ATRIUM VOLUME MOD: 140 CM3
LEFT ATRIUM VOLUME: 90.76 CM3
LEFT INTERNAL DIMENSION IN SYSTOLE: 8.3 CM (ref 2.1–4)
LEFT VENTRICLE DIASTOLIC VOLUME INDEX: 173.57 ML/M2
LEFT VENTRICLE DIASTOLIC VOLUME: 399.2 ML
LEFT VENTRICLE MASS INDEX: 128 G/M2
LEFT VENTRICLE SYSTOLIC VOLUME INDEX: 162.8 ML/M2
LEFT VENTRICLE SYSTOLIC VOLUME: 374.47 ML
LEFT VENTRICULAR INTERNAL DIMENSION IN DIASTOLE: 8.55 CM (ref 3.5–6)
LEFT VENTRICULAR MASS: 295.23 G
LYMPHOCYTES # BLD AUTO: 0.8 K/UL (ref 1–4.8)
LYMPHOCYTES # BLD AUTO: 1 K/UL (ref 1–4.8)
LYMPHOCYTES # BLD AUTO: 1.2 K/UL (ref 1–4.8)
LYMPHOCYTES NFR BLD: 2.3 % (ref 18–48)
LYMPHOCYTES NFR BLD: 3.1 % (ref 18–48)
LYMPHOCYTES NFR BLD: 3.6 % (ref 18–48)
MAGNESIUM SERPL-MCNC: 2.9 MG/DL (ref 1.6–2.6)
MAGNESIUM SERPL-MCNC: 3.1 MG/DL (ref 1.6–2.6)
MAGNESIUM SERPL-MCNC: 3.2 MG/DL (ref 1.6–2.6)
MCH RBC QN AUTO: 28.6 PG (ref 27–31)
MCH RBC QN AUTO: 28.6 PG (ref 27–31)
MCH RBC QN AUTO: 28.7 PG (ref 27–31)
MCHC RBC AUTO-ENTMCNC: 30 G/DL (ref 32–36)
MCHC RBC AUTO-ENTMCNC: 30.5 G/DL (ref 32–36)
MCHC RBC AUTO-ENTMCNC: 30.9 G/DL (ref 32–36)
MCV RBC AUTO: 93 FL (ref 82–98)
MCV RBC AUTO: 94 FL (ref 82–98)
MCV RBC AUTO: 95 FL (ref 82–98)
METHEMOGLOBIN: 0.5 % (ref 0–3)
MIN VOL: 10.1
MIN VOL: 9.18
MIN VOL: 9.92
MODE: ABNORMAL
MODE: NORMAL
MONOCYTES # BLD AUTO: 2 K/UL (ref 0.3–1)
MONOCYTES # BLD AUTO: 2 K/UL (ref 0.3–1)
MONOCYTES # BLD AUTO: 2.2 K/UL (ref 0.3–1)
MONOCYTES NFR BLD: 5.9 % (ref 4–15)
MONOCYTES NFR BLD: 6.5 % (ref 4–15)
MONOCYTES NFR BLD: 6.7 % (ref 4–15)
MV PEAK E VEL: 0.85 M/S
NEUTROPHILS # BLD AUTO: 27.3 K/UL (ref 1.8–7.7)
NEUTROPHILS # BLD AUTO: 28 K/UL (ref 1.8–7.7)
NEUTROPHILS # BLD AUTO: 30.7 K/UL (ref 1.8–7.7)
NEUTROPHILS NFR BLD: 86.9 % (ref 38–73)
NEUTROPHILS NFR BLD: 87.7 % (ref 38–73)
NEUTROPHILS NFR BLD: 88.8 % (ref 38–73)
NRBC BLD-RTO: 0 /100 WBC
PCO2 BLDA: 36.7 MMHG (ref 35–45)
PCO2 BLDA: 37.6 MMHG (ref 35–45)
PCO2 BLDA: 41.9 MMHG (ref 35–45)
PCO2 BLDA: 42.8 MMHG (ref 35–45)
PCO2 BLDA: 43.7 MMHG (ref 35–45)
PCO2 BLDA: 44.3 MMHG (ref 35–45)
PEEP: 5
PH SMN: 7.37 [PH] (ref 7.35–7.45)
PH SMN: 7.39 [PH] (ref 7.35–7.45)
PH SMN: 7.39 [PH] (ref 7.35–7.45)
PH SMN: 7.4 [PH] (ref 7.35–7.45)
PH SMN: 7.41 [PH] (ref 7.35–7.45)
PH SMN: 7.41 [PH] (ref 7.35–7.45)
PHOSPHATE SERPL-MCNC: 3.6 MG/DL (ref 2.7–4.5)
PHOSPHATE SERPL-MCNC: 4 MG/DL (ref 2.7–4.5)
PHOSPHATE SERPL-MCNC: 4.4 MG/DL (ref 2.7–4.5)
PIP: 17
PIP: 28
PIP: 31
PISA TR MAX VEL: 2.89 M/S
PLATELET # BLD AUTO: 207 K/UL (ref 150–450)
PLATELET # BLD AUTO: 226 K/UL (ref 150–450)
PLATELET # BLD AUTO: 249 K/UL (ref 150–450)
PMV BLD AUTO: 12.9 FL (ref 9.2–12.9)
PMV BLD AUTO: 13.2 FL (ref 9.2–12.9)
PMV BLD AUTO: 13.3 FL (ref 9.2–12.9)
PO2 BLDA: 73 MMHG (ref 80–100)
PO2 BLDA: 79 MMHG (ref 80–100)
PO2 BLDA: 79 MMHG (ref 80–100)
PO2 BLDA: 82 MMHG (ref 80–100)
PO2 BLDA: 85 MMHG (ref 80–100)
PO2 BLDA: 89 MMHG (ref 80–100)
POC BE: -1 MMOL/L
POC BE: -2 MMOL/L
POC BE: 0 MMOL/L
POC BE: 1 MMOL/L
POC BE: 2 MMOL/L
POC BE: 2 MMOL/L
POC IONIZED CALCIUM: 1.15 MMOL/L (ref 1.06–1.42)
POC IONIZED CALCIUM: 1.18 MMOL/L (ref 1.06–1.42)
POC IONIZED CALCIUM: 1.18 MMOL/L (ref 1.06–1.42)
POC IONIZED CALCIUM: 1.19 MMOL/L (ref 1.06–1.42)
POC IONIZED CALCIUM: 1.2 MMOL/L (ref 1.06–1.42)
POC IONIZED CALCIUM: 1.2 MMOL/L (ref 1.06–1.42)
POC SATURATED O2: 94 % (ref 95–100)
POC SATURATED O2: 95 % (ref 95–100)
POC SATURATED O2: 96 % (ref 95–100)
POC SATURATED O2: 96 % (ref 95–100)
POC SATURATED O2: 97 % (ref 95–100)
POC SATURATED O2: 97 % (ref 95–100)
POC TCO2: 24 MMOL/L (ref 23–27)
POC TCO2: 25 MMOL/L (ref 23–27)
POC TCO2: 26 MMOL/L (ref 23–27)
POC TCO2: 27 MMOL/L (ref 23–27)
POC TCO2: 28 MMOL/L (ref 23–27)
POC TCO2: 28 MMOL/L (ref 23–27)
POCT GLUCOSE: 125 MG/DL (ref 70–110)
POCT GLUCOSE: 142 MG/DL (ref 70–110)
POCT GLUCOSE: 153 MG/DL (ref 70–110)
POCT GLUCOSE: 159 MG/DL (ref 70–110)
POCT GLUCOSE: 162 MG/DL (ref 70–110)
POCT GLUCOSE: 167 MG/DL (ref 70–110)
POCT GLUCOSE: 172 MG/DL (ref 70–110)
POTASSIUM BLD-SCNC: 3.4 MMOL/L (ref 3.5–5.1)
POTASSIUM BLD-SCNC: 3.6 MMOL/L (ref 3.5–5.1)
POTASSIUM BLD-SCNC: 3.7 MMOL/L (ref 3.5–5.1)
POTASSIUM BLD-SCNC: 4.1 MMOL/L (ref 3.5–5.1)
POTASSIUM SERPL-SCNC: 3.6 MMOL/L (ref 3.5–5.1)
POTASSIUM SERPL-SCNC: 3.7 MMOL/L (ref 3.5–5.1)
POTASSIUM SERPL-SCNC: 3.7 MMOL/L (ref 3.5–5.1)
PROT SERPL-MCNC: 5.9 G/DL (ref 6–8.4)
PROT SERPL-MCNC: 6.1 G/DL (ref 6–8.4)
PROT SERPL-MCNC: 6.1 G/DL (ref 6–8.4)
PROTHROMBIN TIME: 14 SEC (ref 9–12.5)
PROTHROMBIN TIME: 15.6 SEC (ref 9–12.5)
PROTHROMBIN TIME: 18.7 SEC (ref 9–12.5)
RA MAJOR: 5.32 CM
RA WIDTH: 3.7 CM
RBC # BLD AUTO: 2.55 M/UL (ref 4.6–6.2)
RBC # BLD AUTO: 2.73 M/UL (ref 4.6–6.2)
RBC # BLD AUTO: 2.75 M/UL (ref 4.6–6.2)
RIGHT VENTRICULAR END-DIASTOLIC DIMENSION: 4 CM
SAMPLE: ABNORMAL
SAMPLE: NORMAL
SINUS: 3.64 CM
SITE: ABNORMAL
SITE: NORMAL
SODIUM BLD-SCNC: 149 MMOL/L (ref 136–145)
SODIUM BLD-SCNC: 150 MMOL/L (ref 136–145)
SODIUM SERPL-SCNC: 146 MMOL/L (ref 136–145)
SODIUM SERPL-SCNC: 148 MMOL/L (ref 136–145)
SODIUM SERPL-SCNC: 149 MMOL/L (ref 136–145)
TR MAX PG: 33 MMHG
TRICUSPID ANNULAR PLANE SYSTOLIC EXCURSION: 1.27 CM
VANCOMYCIN SERPL-MCNC: 11.3 UG/ML
VT: 400
WBC # BLD AUTO: 31.19 K/UL (ref 3.9–12.7)
WBC # BLD AUTO: 32.22 K/UL (ref 3.9–12.7)
WBC # BLD AUTO: 34.57 K/UL (ref 3.9–12.7)

## 2022-07-21 PROCEDURE — 36556 INSERT NON-TUNNEL CV CATH: CPT

## 2022-07-21 PROCEDURE — 25000003 PHARM REV CODE 250

## 2022-07-21 PROCEDURE — 27000221 HC OXYGEN, UP TO 24 HOURS

## 2022-07-21 PROCEDURE — 63600175 PHARM REV CODE 636 W HCPCS: Performed by: STUDENT IN AN ORGANIZED HEALTH CARE EDUCATION/TRAINING PROGRAM

## 2022-07-21 PROCEDURE — 63600175 PHARM REV CODE 636 W HCPCS: Mod: JG

## 2022-07-21 PROCEDURE — C1751 CATH, INF, PER/CENT/MIDLINE: HCPCS

## 2022-07-21 PROCEDURE — 20000000 HC ICU ROOM

## 2022-07-21 PROCEDURE — 99233 SBSQ HOSP IP/OBS HIGH 50: CPT | Mod: ,,, | Performed by: INTERNAL MEDICINE

## 2022-07-21 PROCEDURE — 25000003 PHARM REV CODE 250: Performed by: STUDENT IN AN ORGANIZED HEALTH CARE EDUCATION/TRAINING PROGRAM

## 2022-07-21 PROCEDURE — 63600367 HC NITRIC OXIDE PER HOUR

## 2022-07-21 PROCEDURE — 37799 UNLISTED PX VASCULAR SURGERY: CPT

## 2022-07-21 PROCEDURE — 63600175 PHARM REV CODE 636 W HCPCS: Performed by: PHYSICIAN ASSISTANT

## 2022-07-21 PROCEDURE — 84132 ASSAY OF SERUM POTASSIUM: CPT

## 2022-07-21 PROCEDURE — 85730 THROMBOPLASTIN TIME PARTIAL: CPT | Mod: 91 | Performed by: THORACIC SURGERY (CARDIOTHORACIC VASCULAR SURGERY)

## 2022-07-21 PROCEDURE — 82330 ASSAY OF CALCIUM: CPT | Mod: 91 | Performed by: THORACIC SURGERY (CARDIOTHORACIC VASCULAR SURGERY)

## 2022-07-21 PROCEDURE — 85730 THROMBOPLASTIN TIME PARTIAL: CPT | Performed by: THORACIC SURGERY (CARDIOTHORACIC VASCULAR SURGERY)

## 2022-07-21 PROCEDURE — 93750 PR INTERROGATE VENT ASSIST DEV, IN PERSON, W PHYSICIAN ANALYSIS: ICD-10-PCS | Mod: ,,, | Performed by: INTERNAL MEDICINE

## 2022-07-21 PROCEDURE — 83615 LACTATE (LD) (LDH) ENZYME: CPT | Performed by: STUDENT IN AN ORGANIZED HEALTH CARE EDUCATION/TRAINING PROGRAM

## 2022-07-21 PROCEDURE — 27200966 HC CLOSED SUCTION SYSTEM

## 2022-07-21 PROCEDURE — 25000003 PHARM REV CODE 250: Performed by: THORACIC SURGERY (CARDIOTHORACIC VASCULAR SURGERY)

## 2022-07-21 PROCEDURE — 25000003 PHARM REV CODE 250: Performed by: PHYSICIAN ASSISTANT

## 2022-07-21 PROCEDURE — 36620 INSERTION CATHETER ARTERY: CPT

## 2022-07-21 PROCEDURE — C9113 INJ PANTOPRAZOLE SODIUM, VIA: HCPCS

## 2022-07-21 PROCEDURE — 83605 ASSAY OF LACTIC ACID: CPT

## 2022-07-21 PROCEDURE — 99233 PR SUBSEQUENT HOSPITAL CARE,LEVL III: ICD-10-PCS | Mod: ,,, | Performed by: INTERNAL MEDICINE

## 2022-07-21 PROCEDURE — 85610 PROTHROMBIN TIME: CPT | Mod: 91 | Performed by: THORACIC SURGERY (CARDIOTHORACIC VASCULAR SURGERY)

## 2022-07-21 PROCEDURE — 85014 HEMATOCRIT: CPT

## 2022-07-21 PROCEDURE — 36556 PR INSERT NON-TUNNEL CV CATH 5+ YRS OLD: ICD-10-PCS | Mod: ,,, | Performed by: ANESTHESIOLOGY

## 2022-07-21 PROCEDURE — 99356 PR PROLONGED SERV,INPATIENT,1ST HR: ICD-10-PCS | Mod: ,,, | Performed by: STUDENT IN AN ORGANIZED HEALTH CARE EDUCATION/TRAINING PROGRAM

## 2022-07-21 PROCEDURE — 85025 COMPLETE CBC W/AUTO DIFF WBC: CPT | Performed by: THORACIC SURGERY (CARDIOTHORACIC VASCULAR SURGERY)

## 2022-07-21 PROCEDURE — 85347 COAGULATION TIME ACTIVATED: CPT

## 2022-07-21 PROCEDURE — 82803 BLOOD GASES ANY COMBINATION: CPT

## 2022-07-21 PROCEDURE — 99900035 HC TECH TIME PER 15 MIN (STAT)

## 2022-07-21 PROCEDURE — 80202 ASSAY OF VANCOMYCIN: CPT | Performed by: THORACIC SURGERY (CARDIOTHORACIC VASCULAR SURGERY)

## 2022-07-21 PROCEDURE — 82330 ASSAY OF CALCIUM: CPT

## 2022-07-21 PROCEDURE — 27000248 HC VAD-ADDITIONAL DAY

## 2022-07-21 PROCEDURE — 63600175 PHARM REV CODE 636 W HCPCS: Performed by: THORACIC SURGERY (CARDIOTHORACIC VASCULAR SURGERY)

## 2022-07-21 PROCEDURE — 94761 N-INVAS EAR/PLS OXIMETRY MLT: CPT

## 2022-07-21 PROCEDURE — 99233 PR SUBSEQUENT HOSPITAL CARE,LEVL III: ICD-10-PCS | Mod: ,,, | Performed by: STUDENT IN AN ORGANIZED HEALTH CARE EDUCATION/TRAINING PROGRAM

## 2022-07-21 PROCEDURE — 83050 HGB METHEMOGLOBIN QUAN: CPT

## 2022-07-21 PROCEDURE — 36410 VNPNXR 3YR/> PHY/QHP DX/THER: CPT

## 2022-07-21 PROCEDURE — 99291 PR CRITICAL CARE, E/M 30-74 MINUTES: ICD-10-PCS | Mod: ,,, | Performed by: INTERNAL MEDICINE

## 2022-07-21 PROCEDURE — 99356 PR PROLONGED SERV,INPATIENT,1ST HR: CPT | Mod: ,,, | Performed by: STUDENT IN AN ORGANIZED HEALTH CARE EDUCATION/TRAINING PROGRAM

## 2022-07-21 PROCEDURE — 99291 CRITICAL CARE FIRST HOUR: CPT | Mod: ,,, | Performed by: INTERNAL MEDICINE

## 2022-07-21 PROCEDURE — 99233 SBSQ HOSP IP/OBS HIGH 50: CPT | Mod: ,,, | Performed by: STUDENT IN AN ORGANIZED HEALTH CARE EDUCATION/TRAINING PROGRAM

## 2022-07-21 PROCEDURE — 99900026 HC AIRWAY MAINTENANCE (STAT)

## 2022-07-21 PROCEDURE — 63600175 PHARM REV CODE 636 W HCPCS: Performed by: ANESTHESIOLOGY

## 2022-07-21 PROCEDURE — 93750 INTERROGATION VAD IN PERSON: CPT | Mod: ,,, | Performed by: INTERNAL MEDICINE

## 2022-07-21 PROCEDURE — 94003 VENT MGMT INPAT SUBQ DAY: CPT

## 2022-07-21 PROCEDURE — 36556 INSERT NON-TUNNEL CV CATH: CPT | Mod: ,,, | Performed by: ANESTHESIOLOGY

## 2022-07-21 PROCEDURE — 25000242 PHARM REV CODE 250 ALT 637 W/ HCPCS: Performed by: STUDENT IN AN ORGANIZED HEALTH CARE EDUCATION/TRAINING PROGRAM

## 2022-07-21 PROCEDURE — 63600175 PHARM REV CODE 636 W HCPCS

## 2022-07-21 PROCEDURE — 83605 ASSAY OF LACTIC ACID: CPT | Performed by: THORACIC SURGERY (CARDIOTHORACIC VASCULAR SURGERY)

## 2022-07-21 PROCEDURE — 84295 ASSAY OF SERUM SODIUM: CPT

## 2022-07-21 PROCEDURE — 84100 ASSAY OF PHOSPHORUS: CPT | Mod: 91 | Performed by: THORACIC SURGERY (CARDIOTHORACIC VASCULAR SURGERY)

## 2022-07-21 PROCEDURE — 83735 ASSAY OF MAGNESIUM: CPT | Mod: 91 | Performed by: THORACIC SURGERY (CARDIOTHORACIC VASCULAR SURGERY)

## 2022-07-21 PROCEDURE — 82565 ASSAY OF CREATININE: CPT

## 2022-07-21 PROCEDURE — 76937 US GUIDE VASCULAR ACCESS: CPT

## 2022-07-21 PROCEDURE — 85384 FIBRINOGEN ACTIVITY: CPT | Performed by: THORACIC SURGERY (CARDIOTHORACIC VASCULAR SURGERY)

## 2022-07-21 PROCEDURE — 80053 COMPREHEN METABOLIC PANEL: CPT | Performed by: THORACIC SURGERY (CARDIOTHORACIC VASCULAR SURGERY)

## 2022-07-21 PROCEDURE — 99291 CRITICAL CARE FIRST HOUR: CPT | Mod: 25,,, | Performed by: ANESTHESIOLOGY

## 2022-07-21 PROCEDURE — 94640 AIRWAY INHALATION TREATMENT: CPT

## 2022-07-21 PROCEDURE — 99291 PR CRITICAL CARE, E/M 30-74 MINUTES: ICD-10-PCS | Mod: 25,,, | Performed by: ANESTHESIOLOGY

## 2022-07-21 RX ORDER — ACETAMINOPHEN 10 MG/ML
1000 INJECTION, SOLUTION INTRAVENOUS ONCE
Status: COMPLETED | OUTPATIENT
Start: 2022-07-21 | End: 2022-07-21

## 2022-07-21 RX ORDER — GUAIFENESIN 100 MG/5ML
300 SOLUTION ORAL EVERY 6 HOURS
Status: DISCONTINUED | OUTPATIENT
Start: 2022-07-21 | End: 2022-08-13

## 2022-07-21 RX ORDER — LIDOCAINE HYDROCHLORIDE 10 MG/ML
100 INJECTION INFILTRATION; PERINEURAL ONCE
Status: COMPLETED | OUTPATIENT
Start: 2022-07-21 | End: 2022-07-21

## 2022-07-21 RX ORDER — VANCOMYCIN HCL IN 5 % DEXTROSE 1G/250ML
1000 PLASTIC BAG, INJECTION (ML) INTRAVENOUS ONCE
Status: DISCONTINUED | OUTPATIENT
Start: 2022-07-21 | End: 2022-07-21

## 2022-07-21 RX ORDER — HEPARIN SODIUM 10000 [USP'U]/100ML
1900 INJECTION, SOLUTION INTRAVENOUS CONTINUOUS
Status: DISCONTINUED | OUTPATIENT
Start: 2022-07-21 | End: 2022-07-21

## 2022-07-21 RX ORDER — PROPOFOL 10 MG/ML
0-50 INJECTION, EMULSION INTRAVENOUS CONTINUOUS
Status: DISCONTINUED | OUTPATIENT
Start: 2022-07-21 | End: 2022-07-26

## 2022-07-21 RX ORDER — ACETAMINOPHEN 10 MG/ML
1000 INJECTION, SOLUTION INTRAVENOUS EVERY 8 HOURS
Status: DISCONTINUED | OUTPATIENT
Start: 2022-07-21 | End: 2022-07-21

## 2022-07-21 RX ORDER — HEPARIN SODIUM 10000 [USP'U]/100ML
1800 INJECTION, SOLUTION INTRAVENOUS CONTINUOUS
Status: DISCONTINUED | OUTPATIENT
Start: 2022-07-21 | End: 2022-07-26

## 2022-07-21 RX ORDER — ACETAMINOPHEN 10 MG/ML
1000 INJECTION, SOLUTION INTRAVENOUS ONCE
Status: COMPLETED | OUTPATIENT
Start: 2022-07-22 | End: 2022-07-21

## 2022-07-21 RX ORDER — CALCIUM GLUCONATE 20 MG/ML
1 INJECTION, SOLUTION INTRAVENOUS ONCE
Status: COMPLETED | OUTPATIENT
Start: 2022-07-21 | End: 2022-07-22

## 2022-07-21 RX ORDER — PROPOFOL 10 MG/ML
INJECTION, EMULSION INTRAVENOUS
Status: COMPLETED
Start: 2022-07-21 | End: 2022-07-21

## 2022-07-21 RX ORDER — POLYETHYLENE GLYCOL 3350, SODIUM SULFATE ANHYDROUS, SODIUM BICARBONATE, SODIUM CHLORIDE, POTASSIUM CHLORIDE 236; 22.74; 6.74; 5.86; 2.97 G/4L; G/4L; G/4L; G/4L; G/4L
200 POWDER, FOR SOLUTION ORAL EVERY 4 HOURS
Status: DISCONTINUED | OUTPATIENT
Start: 2022-07-21 | End: 2022-07-21

## 2022-07-21 RX ORDER — POTASSIUM CHLORIDE 14.9 MG/ML
20 INJECTION INTRAVENOUS ONCE
Status: COMPLETED | OUTPATIENT
Start: 2022-07-21 | End: 2022-07-21

## 2022-07-21 RX ORDER — SODIUM CHLORIDE 450 MG/100ML
INJECTION, SOLUTION INTRAVENOUS CONTINUOUS
Status: DISCONTINUED | OUTPATIENT
Start: 2022-07-21 | End: 2022-07-22

## 2022-07-21 RX ORDER — LIDOCAINE HYDROCHLORIDE ANHYDROUS AND DEXTROSE MONOHYDRATE .8; 5 G/100ML; G/100ML
1 INJECTION, SOLUTION INTRAVENOUS CONTINUOUS
Status: DISCONTINUED | OUTPATIENT
Start: 2022-07-21 | End: 2022-07-23

## 2022-07-21 RX ORDER — HYDROMORPHONE HYDROCHLORIDE 1 MG/ML
1 INJECTION, SOLUTION INTRAMUSCULAR; INTRAVENOUS; SUBCUTANEOUS ONCE
Status: COMPLETED | OUTPATIENT
Start: 2022-07-21 | End: 2022-07-21

## 2022-07-21 RX ORDER — DEXTROSE MONOHYDRATE 50 MG/ML
INJECTION, SOLUTION INTRAVENOUS CONTINUOUS
Status: DISCONTINUED | OUTPATIENT
Start: 2022-07-21 | End: 2022-07-22

## 2022-07-21 RX ORDER — NAPROXEN SODIUM 220 MG/1
81 TABLET, FILM COATED ORAL DAILY
Status: DISCONTINUED | OUTPATIENT
Start: 2022-07-21 | End: 2022-08-18

## 2022-07-21 RX ORDER — SODIUM CHLORIDE 450 MG/100ML
INJECTION, SOLUTION INTRAVENOUS CONTINUOUS
Status: DISCONTINUED | OUTPATIENT
Start: 2022-07-21 | End: 2022-07-21

## 2022-07-21 RX ORDER — CEFEPIME HYDROCHLORIDE 1 G/50ML
2 INJECTION, SOLUTION INTRAVENOUS
Status: DISCONTINUED | OUTPATIENT
Start: 2022-07-21 | End: 2022-07-22

## 2022-07-21 RX ORDER — ACETAMINOPHEN 10 MG/ML
1000 INJECTION, SOLUTION INTRAVENOUS ONCE
Status: DISCONTINUED | OUTPATIENT
Start: 2022-07-22 | End: 2022-07-21

## 2022-07-21 RX ADMIN — DEXTROSE: 5 SOLUTION INTRAVENOUS at 11:07

## 2022-07-21 RX ADMIN — CEFEPIME HYDROCHLORIDE 2 G: 2 INJECTION, SOLUTION INTRAVENOUS at 07:07

## 2022-07-21 RX ADMIN — SODIUM CHLORIDE: 0.9 INJECTION, SOLUTION INTRAVENOUS at 11:07

## 2022-07-21 RX ADMIN — AMIODARONE HYDROCHLORIDE 1 MG/MIN: 1.8 INJECTION, SOLUTION INTRAVENOUS at 09:07

## 2022-07-21 RX ADMIN — ALBUTEROL SULFATE 2.5 MG: 2.5 SOLUTION RESPIRATORY (INHALATION) at 12:07

## 2022-07-21 RX ADMIN — HYDROMORPHONE HYDROCHLORIDE 1 MG: 1 INJECTION, SOLUTION INTRAMUSCULAR; INTRAVENOUS; SUBCUTANEOUS at 11:07

## 2022-07-21 RX ADMIN — FUROSEMIDE 1 MG/HR: 10 INJECTION, SOLUTION INTRAMUSCULAR; INTRAVENOUS at 12:07

## 2022-07-21 RX ADMIN — AMIODARONE HYDROCHLORIDE 0.5 MG/MIN: 1.8 INJECTION, SOLUTION INTRAVENOUS at 05:07

## 2022-07-21 RX ADMIN — SODIUM CHLORIDE: 0.9 INJECTION, SOLUTION INTRAVENOUS at 09:07

## 2022-07-21 RX ADMIN — DEXMEDETOMIDINE HYDROCHLORIDE 1.4 MCG/KG/HR: 4 INJECTION INTRAVENOUS at 11:07

## 2022-07-21 RX ADMIN — GUAIFENESIN 300 MG: 100 SOLUTION ORAL at 07:07

## 2022-07-21 RX ADMIN — DEXMEDETOMIDINE HYDROCHLORIDE 1.4 MCG/KG/HR: 4 INJECTION INTRAVENOUS at 01:07

## 2022-07-21 RX ADMIN — ACETYLCYSTEINE 4 ML: 100 INHALANT RESPIRATORY (INHALATION) at 07:07

## 2022-07-21 RX ADMIN — NOREPINEPHRINE BITARTRATE 0.04 MCG/KG/MIN: 8 INJECTION, SOLUTION INTRAVENOUS at 05:07

## 2022-07-21 RX ADMIN — ACETAMINOPHEN 1000 MG: 10 INJECTION INTRAVENOUS at 06:07

## 2022-07-21 RX ADMIN — PIPERACILLIN SODIUM AND TAZOBACTAM SODIUM 4.5 G: 4; .5 INJECTION, POWDER, LYOPHILIZED, FOR SOLUTION INTRAVENOUS at 06:07

## 2022-07-21 RX ADMIN — PROPOFOL 20 MCG/KG/MIN: 10 INJECTION, EMULSION INTRAVENOUS at 07:07

## 2022-07-21 RX ADMIN — MICAFUNGIN SODIUM 100 MG: 100 INJECTION, POWDER, LYOPHILIZED, FOR SOLUTION INTRAVENOUS at 06:07

## 2022-07-21 RX ADMIN — POTASSIUM CHLORIDE 20 MEQ: 14.9 INJECTION, SOLUTION INTRAVENOUS at 01:07

## 2022-07-21 RX ADMIN — NOREPINEPHRINE BITARTRATE 0.04 MCG/KG/MIN: 8 INJECTION, SOLUTION INTRAVENOUS at 10:07

## 2022-07-21 RX ADMIN — ACETAMINOPHEN 1000 MG: 10 INJECTION INTRAVENOUS at 11:07

## 2022-07-21 RX ADMIN — SODIUM CHLORIDE 500 ML: 0.45 INJECTION, SOLUTION INTRAVENOUS at 07:07

## 2022-07-21 RX ADMIN — ASPIRIN 81 MG CHEWABLE TABLET 81 MG: 81 TABLET CHEWABLE at 12:07

## 2022-07-21 RX ADMIN — EPINEPHRINE 0.12 MCG/KG/MIN: 1 INJECTION INTRAMUSCULAR; INTRAVENOUS; SUBCUTANEOUS at 09:07

## 2022-07-21 RX ADMIN — LIDOCAINE HYDROCHLORIDE 1 MG/MIN: 8 INJECTION, SOLUTION INTRAVENOUS at 11:07

## 2022-07-21 RX ADMIN — LEVOTHYROXINE SODIUM 112 MCG: 112 TABLET ORAL at 05:07

## 2022-07-21 RX ADMIN — CALCIUM GLUCONATE 1 G: 20 INJECTION, SOLUTION INTRAVENOUS at 11:07

## 2022-07-21 RX ADMIN — PIPERACILLIN SODIUM AND TAZOBACTAM SODIUM 4.5 G: 4; .5 INJECTION, POWDER, LYOPHILIZED, FOR SOLUTION INTRAVENOUS at 05:07

## 2022-07-21 RX ADMIN — POLYETHYLENE GLYCOL 3350 17 G: 17 POWDER, FOR SOLUTION ORAL at 08:07

## 2022-07-21 RX ADMIN — HYDROMORPHONE HYDROCHLORIDE 1 MG: 1 INJECTION, SOLUTION INTRAMUSCULAR; INTRAVENOUS; SUBCUTANEOUS at 08:07

## 2022-07-21 RX ADMIN — EPINEPHRINE 0.08 MCG/KG/MIN: 1 INJECTION INTRAMUSCULAR; INTRAVENOUS; SUBCUTANEOUS at 05:07

## 2022-07-21 RX ADMIN — PROPOFOL 10 MCG/KG/MIN: 10 INJECTION, EMULSION INTRAVENOUS at 01:07

## 2022-07-21 RX ADMIN — ALBUTEROL SULFATE 2.5 MG: 2.5 SOLUTION RESPIRATORY (INHALATION) at 07:07

## 2022-07-21 RX ADMIN — PANTOPRAZOLE SODIUM 40 MG: 40 INJECTION, POWDER, FOR SOLUTION INTRAVENOUS at 08:07

## 2022-07-21 RX ADMIN — DEXMEDETOMIDINE HYDROCHLORIDE 1.4 MCG/KG/HR: 4 INJECTION INTRAVENOUS at 05:07

## 2022-07-21 RX ADMIN — HEPARIN SODIUM AND DEXTROSE 1800 UNITS/HR: 10000; 5 INJECTION INTRAVENOUS at 04:07

## 2022-07-21 RX ADMIN — ANGIOTENSIN II 20 NG/KG/MIN: 2.5 INJECTION INTRAVENOUS at 10:07

## 2022-07-21 RX ADMIN — HYDROMORPHONE HYDROCHLORIDE 1 MG: 1 INJECTION, SOLUTION INTRAMUSCULAR; INTRAVENOUS; SUBCUTANEOUS at 05:07

## 2022-07-21 RX ADMIN — HYDROMORPHONE HYDROCHLORIDE 1 MG: 1 INJECTION, SOLUTION INTRAMUSCULAR; INTRAVENOUS; SUBCUTANEOUS at 10:07

## 2022-07-21 RX ADMIN — VASOPRESSIN 0.04 UNITS/MIN: 20 INJECTION INTRAVENOUS at 12:07

## 2022-07-21 RX ADMIN — VASOPRESSIN 0.04 UNITS/MIN: 20 INJECTION INTRAVENOUS at 05:07

## 2022-07-21 RX ADMIN — LIDOCAINE HYDROCHLORIDE 100 MG: 10 INJECTION, SOLUTION INFILTRATION; PERINEURAL at 11:07

## 2022-07-21 RX ADMIN — DEXMEDETOMIDINE HYDROCHLORIDE 1.4 MCG/KG/HR: 4 INJECTION INTRAVENOUS at 03:07

## 2022-07-21 RX ADMIN — DEXMEDETOMIDINE HYDROCHLORIDE 1.4 MCG/KG/HR: 4 INJECTION INTRAVENOUS at 08:07

## 2022-07-21 RX ADMIN — GUAIFENESIN 300 MG: 100 SOLUTION ORAL at 11:07

## 2022-07-21 RX ADMIN — LACTULOSE 20 G: 20 SOLUTION ORAL at 08:07

## 2022-07-21 RX ADMIN — SODIUM CHLORIDE: 0.45 INJECTION, SOLUTION INTRAVENOUS at 01:07

## 2022-07-21 RX ADMIN — POTASSIUM CHLORIDE 20 MEQ: 14.9 INJECTION, SOLUTION INTRAVENOUS at 09:07

## 2022-07-21 RX ADMIN — POTASSIUM CHLORIDE 40 MEQ: 29.8 INJECTION, SOLUTION INTRAVENOUS at 05:07

## 2022-07-21 RX ADMIN — ACETAMINOPHEN 999.01 MG: 160 SOLUTION ORAL at 01:07

## 2022-07-21 RX ADMIN — HEPARIN SODIUM 1600 UNITS/HR: 5000 INJECTION INTRAVENOUS; SUBCUTANEOUS at 05:07

## 2022-07-21 RX ADMIN — DEXMEDETOMIDINE HYDROCHLORIDE 0.2 MCG/KG/HR: 4 INJECTION INTRAVENOUS at 09:07

## 2022-07-21 RX ADMIN — ACETYLCYSTEINE 4 ML: 100 INHALANT RESPIRATORY (INHALATION) at 12:07

## 2022-07-21 RX ADMIN — HYDROMORPHONE HYDROCHLORIDE 1 MG: 1 INJECTION, SOLUTION INTRAMUSCULAR; INTRAVENOUS; SUBCUTANEOUS at 06:07

## 2022-07-21 NOTE — PROGRESS NOTES
[facility] - Surgical Intensive Care  Heart Transplant  Progress Note    Patient Name: Tim Richards  MRN: [id number]  Admission Date: 6/27/2022  Hospital Length of Stay: 24 days  Attending Physician: Yg Kaufman MD  Primary Care Provider: Deyanira Booth MD  Principal Problem:Left ventricular assist device (LVAD) complication    Subjective:     Interval History:   The patient was febrile overnight with increased pressor requirements.    CVP: 5  Current drips:  Lasix at 1  Epi at 0.12  Heparin  Precedex at 1    Continuous Infusions:   amiodarone in dextrose 5% 0.5 mg/min (07/21/22 1044)    dexmedetomidine (PRECEDEX) infusion 1 mcg/kg/hr (07/21/22 1044)    dextrose 5 % 50 mL/hr at 07/21/22 1108    EPINEPHrine 0.1 mcg/kg/min (07/21/22 1044)    furosemide (LASIX) 2 mg/mL continuous infusion (non-titrating)      heparin (porcine) in 5 % dex      nitric oxide gas      NORepinephrine bitartrate-D5W 0.04 mcg/kg/min (07/21/22 1053)    vasopressin 0.04 Units/min (07/21/22 1044)     Scheduled Meds:   acetylcysteine 100 mg/ml (10%)  4 mL Nebulization TID WAKE    albuterol sulfate  2.5 mg Nebulization TID WAKE    aspirin  81 mg Per OG tube Daily    guaiFENesin 100 mg/5 ml  300 mg Per NG tube Q6H    levothyroxine  112 mcg Per OG tube Before breakfast    pantoprazole  40 mg Intravenous BID    piperacillin-tazobactam (ZOSYN) IVPB  4.5 g Intravenous Q8H    polyethylene glycol  200 mL Per NG tube Q4H    warfarin  3 mg Oral Daily     PRN Meds:sodium chloride 0.9%, sodium chloride 0.9%, acetaminophen, dextrose 10%, dextrose 10%, HYDROmorphone, HYDROmorphone    Review of patient's allergies indicates:   Allergen Reactions    Lisinopril Anaphylaxis    Hydralazine analogues      Chronic constipation, impotence, dizziness     Objective:     Vital Signs (Most Recent):  Temp: (!) 100.94 °F (38.3 °C) (07/21/22 1039)  Pulse: 103 (07/21/22 1039)  Resp: (!) 27 (07/21/22 0843)  BP: (!) 68/0 (07/21/22 0845)  SpO2: 97 %  (07/21/22 0926)   Vital Signs (24h Range):  Temp:  [97.16 °F (36.2 °C)-102.9 °F (39.4 °C)] 100.94 °F (38.3 °C)  Pulse:  [] 103  Resp:  [20-35] 27  SpO2:  [95 %-98 %] 97 %  BP: (68-80)/(0) 68/0  Arterial Line BP: ()/(57-74) 76/67     Patient Vitals for the past 72 hrs (Last 3 readings):   Weight   07/21/22 0500 106.6 kg (235 lb)   07/20/22 0500 104.5 kg (230 lb 6.1 oz)   07/19/22 0500 108.9 kg (240 lb)     Body mass index is 31.01 kg/m².      Intake/Output Summary (Last 24 hours) at 7/21/2022 1132  Last data filed at 7/21/2022 1044  Gross per 24 hour   Intake 4670.41 ml   Output 5495 ml   Net -824.59 ml       Hemodynamic Parameters:       Telemetry: NSVT x 7 beats at the longest, PVCs    Physical Exam  Constitutional:       Appearance: He is obese.      Comments: Intubated, sedated   HENT:      Head: Normocephalic.      Nose: Nose normal.      Mouth/Throat:      Mouth: Mucous membranes are moist.   Eyes:      General: Scleral icterus present.   Cardiovascular:      Rate and Rhythm: Normal rate and regular rhythm.      Comments: VAD hum appreciated  Pulses detected via doppler  Pulmonary:      Comments: Coarse mechanical breath sounds bilaterally  Abdominal:      General: Bowel sounds are normal.      Palpations: Abdomen is soft.   Musculoskeletal:      Cervical back: Neck supple.      Right lower leg: No edema.      Left lower leg: No edema.   Skin:     General: Skin is warm.      Coloration: Skin is jaundiced.   Neurological:      Comments: Following directions while sedated.   Psychiatric:      Comments: Sedated       Significant Labs:  CBC:  Recent Labs   Lab 07/20/22  1130 07/20/22  1249 07/20/22  1951 07/20/22  2353 07/21/22  0352 07/21/22  0410 07/21/22  0735   WBC 20.15*  --  26.96*  --  32.22*  --   --    RBC 2.69*  --  2.69*  --  2.73*  --   --    HGB 7.6*  --  7.7*  --  7.8*  --   --    HCT 24.7*   < > 25.5*   < > 26.0* 23* 25*     --  200  --  207  --   --    MCV 92  --  95  --  95  --    --    MCH 28.3  --  28.6  --  28.6  --   --    MCHC 30.8*  --  30.2*  --  30.0*  --   --     < > = values in this interval not displayed.     BNP:  Recent Labs   Lab 07/15/22  0401 07/17/22  0359 07/20/22  0421   BNP 1,106* 955* 813*     CMP:  Recent Labs   Lab 07/20/22  1130 07/20/22 1951 07/21/22  0352   * 102 163*   CALCIUM 9.4 9.0 9.2   ALBUMIN 1.9* 2.6* 2.0*   PROT 5.9* 4.3* 6.1   * 139 149*   K 4.0 3.6 3.7   CO2 22* 23 24   * 109 113*   BUN 64* 7 60*   CREATININE 2.4* 0.4* 2.7*   ALKPHOS 123 110 131   ALT 47* 15 50*   AST 77* 18 72*   BILITOT 14.5* 0.9 15.9*      Coagulation:   Recent Labs   Lab 07/20/22  1130 07/20/22  1545 07/20/22 1951 07/21/22  0000 07/21/22  0352 07/21/22  0630   INR 1.2  --  1.3*  --  1.4*  --    APTT  --  33.0*  --  42.0*  --  43.6*     LDH:  Recent Labs   Lab 07/19/22  0353 07/20/22  0421 07/21/22  0352   * 578* 783*     Microbiology:  Microbiology Results (last 7 days)       Procedure Component Value Units Date/Time    Culture, VAP (BAL) [[id number]] Collected: 07/20/22 1120    Order Status: Completed Specimen: Bronchial Alveolar Lavage from BAL, RML Updated: 07/21/22 0802     VAP BAL CULTURE Normal respiratory sachin     Gram Stain (Respiratory) Few WBC's     Gram Stain (Respiratory) Rare Gram positive cocci     Gram Stain (Respiratory) Rare Gram negative rods    Blood culture [[id number]] Collected: 07/20/22 1450    Order Status: Completed Specimen: Blood from Wrist, Left Updated: 07/21/22 0115     Blood Culture, Routine No Growth to date    Blood culture [[id number]] Collected: 07/20/22 1445    Order Status: Completed Specimen: Blood from Peripheral, Forearm, Left Updated: 07/21/22 0115     Blood Culture, Routine No Growth to date    Blood culture [[id number]]     Order Status: Canceled Specimen: Blood     Blood culture [[id number]]     Order Status: Canceled Specimen: Blood             I have reviewed all pertinent labs within the past 24 hours.    Estimated  Creatinine Clearance: 39.6 mL/min (A) (based on SCr of 2.7 mg/dL (H)).    Diagnostic Results:  I have reviewed and interpreted all pertinent imaging results/findings within the past 24 hours.    Assessment and Plan:     57 Y/O M with Hx of stage D HFrEF (EF 10%) due to NICM underwent HM2 implant 3/8/18 and exchange of outflow graft 3/9/18, Hx VT/VF s/p SICD 2014 presenting with gradual worsening shortness of breath associated with nonpleuritic, nonradiating bilateral 4/10 retrosternal chest pressure pain.  Symptoms began yesterday and have gradually worsened in the last 12 hours.  He does report going to a family picnic with increased consumption of chicken wings, ribs and other salty meat products.  Prior to symptom onset, he reports nausea, nonbloody diarrhea began yesterday and single episode of nonbloody nonbilious vomiting this morning. He also complains of dizziness, lightheadedness, and a mild HA. SOB worsened this morning prompting him to come to the ED.     In the ED, patient was in respiratory distress requiring BiPAP. MAP 96 and started on Nitro gtt. Work-up revealed WBC 10, K 5.4, Cr 2.5 (baseline 1.9), BNP 1950 (baseline 200-300s), Bili 2.1, LDH 1058, and INR 3.2. Bedside TTE with severely reduced EF, AV not opening, septum bulging into RV. Given IV Lasix 80 mg x1 with only 100-200 mL UOP and dark in color. HM2 interrogated and flows have been 8.5-9 L/min with no alarms or power surges. Cardiology consulted in the ED, dicussed with HTS, and decision made to admit to ICU for further mgmt.       * Left ventricular assist device (LVAD) complication  The patient presented with COVID and found to have an uptrending LDH with increased power.  He underwent HM III upgrade on 7/13/22  -Daily LDH   -Continue Heparin drip    Procedure: Device Interrogation Including analysis of device parameters  Current Settings: Ventricular Assist Device    TXP LVAD INTERROGATIONS 7/21/2022 7/21/2022 7/21/2022 7/21/2022  7/21/2022 7/21/2022 7/21/2022   Type HeartMate3 HeartMate3 HeartMate3 HeartMate3 HeartMate3 HeartMate3 HeartMate3   Flow 5.2 5.3 5.3 5.5 5.3 5.1 5.3   Speed 6200 6250 6250 6200 6200 6200 6200   PI 3.1 2.8 2.8 2.8 3.1 3.1 3.2   Power (Jackson) 5.1 5.0 5.1 5.2 5.2 5.1 5.2   LSL - - - 5800 5800 5800 5800   Pulsatility - Intermittent pulse Intermittent pulse Intermittent pulse Intermittent pulse Intermittent pulse Intermittent pulse       Anxiety  -Currently intubated and sedated    History of ventricular tachycardia  Patient experienced an episode of VT on 6/30 and experienced an ICD shock thought to be related to hypokalemia (K of 3.1 on 6/30)  - Aggressively replete K, keep K>4 and Mg>2  - Was previously on mexiletine 25mg q8hrs and amiodarone 400mg daily  - Continue to monitor on telemetry    COVID-19  -Previously hypoxic then recovered  -ID was consulted, recommended Remdesivir, course completed    Elevated serum lactate dehydrogenase (LDH)  Concerned for pump thrombosis  Cont to trend  Plan for pump exchange today    amiodarone induced hypothyrodism  -Continue levothyroxine 112 mcg     LVAD (left ventricular assist device) present  Procedure: Device Interrogation Including analysis of device parameters  Current Settings: Ventricular Assist Device  Review of device function is stable    TXP LVAD INTERROGATIONS 7/21/2022 7/21/2022 7/21/2022 7/21/2022 7/21/2022 7/21/2022 7/21/2022   Type HeartMate3 HeartMate3 HeartMate3 HeartMate3 HeartMate3 HeartMate3 HeartMate3   Flow 5.2 5.3 5.3 5.5 5.3 5.1 5.3   Speed 6200 6250 6250 6200 6200 6200 6200   PI 3.1 2.8 2.8 2.8 3.1 3.1 3.2   Power (Jackson) 5.1 5.0 5.1 5.2 5.2 5.1 5.2   LSL - - - 5800 5800 5800 5800   Pulsatility - Intermittent pulse Intermittent pulse Intermittent pulse Intermittent pulse Intermittent pulse Intermittent pulse       CKD (chronic kidney disease), stage III  WAGNER on CKD  Patient's baseline is 1.5-2.0  - Nephrology is following  - Avoiding nephrotoxic  medications  - Continue to monitor  - Strict I/O's    Chronic combined systolic and diastolic heart failure  #ADHF  #NICMP  Underwent HM III upgrade on 7/13/22, currently on [facility] ECMO  Currently on Lasix gtt  Currently on Epi gtt  Currently on Heparin gtt  Currently on Precedex gtt  Being treated for possible sepsis  Currently intubated and sedated           Harry Canales MD  Heart Transplant  [facility] - [facility]

## 2022-07-21 NOTE — CONSULTS
Single lumen 18g x 10 cm midline placed to left basilic vein. Max dwell date 8/19/22 , Lot# [id number].  Needle advanced into the vessel under real time ultrasound guidance.  Image recorded and saved.

## 2022-07-21 NOTE — PROGRESS NOTES
Interdisciplinary Rounds Report:   Attended interdisciplinary rounds with the [facility]/CTS services including the LVAD Coordinators, social workers, cardiologists, surgeons,  PT/OT/Speech, dietician, and unit charge nurses. Discussed patient status, plan of care, goals of care, including DC date, and post discharge needs. Plan of care will be discussed with the patient and/or family per the physician while rounding on the floor. This is a recurring meeting that is medically and socially necessary to collaborate with the interdisciplinary team to assist patient needs and safe discharge.

## 2022-07-21 NOTE — ASSESSMENT & PLAN NOTE
Procedure: Device Interrogation Including analysis of device parameters  Current Settings: Ventricular Assist Device  Review of device function is stable    TXP LVAD INTERROGATIONS 7/21/2022 7/21/2022 7/21/2022 7/21/2022 7/21/2022 7/21/2022 7/21/2022   Type HeartMate3 HeartMate3 HeartMate3 HeartMate3 HeartMate3 HeartMate3 HeartMate3   Flow 5.2 5.3 5.3 5.5 5.3 5.1 5.3   Speed 6200 6250 6250 6200 6200 6200 6200   PI 3.1 2.8 2.8 2.8 3.1 3.1 3.2   Power (Jackson) 5.1 5.0 5.1 5.2 5.2 5.1 5.2   LSL - - - 5800 5800 5800 5800   Pulsatility - Intermittent pulse Intermittent pulse Intermittent pulse Intermittent pulse Intermittent pulse Intermittent pulse

## 2022-07-21 NOTE — NURSING
2159-  MD Benjamín at bedside and updated on I & O, VS, gtts, VAD numbers, Marilia, vent settings.  Orders received and implemented for 10 mg po metolazone.    2310-  MD Benjamín notified of low uop and HR between 90-130s.  Orders received and implemented for EKG.    2330-  MD Benjamín at bedside.  Orders received and implemented to flush sun with 100 cc, 10 mg IVP lasix, increase epinephrine gtt to 0.12 mcg/kg/min., hold 0000 water bolus, hold TF, call if HR sustains > 120 BPM.

## 2022-07-21 NOTE — SUBJECTIVE & OBJECTIVE
Subjective:     Post-Op Info:  Procedure(s) (LRB):  CLOSURE, WOUND, STERNUM (N/A)  APPLICATION, WOUND VAC (N/A)  INSERTION, GRAFT, PERICARDIUM (N/A)  IRRIGATION, MEDIASTINUM (N/A)   6 Days Post-Op      Reason for Visit:  Patient is seen in follow up management of LVAD pump exchange     Interval History:   - Patient with increasing white count and Tbili.   - ID consulted - consider fungemia   - Liver US ordered  - Vad speed increased to 6400. TTE ordered  - Some VT this AM. EP consulted.   -  Heparin set at 1600 non titrating.   - Will plan to d/c midline after new central line placed.   - Started patient on Levo 0.04 and epi to 0.1.   - Will add 200mL GoLytley Q4.   - Do daily Vanc levels and based on level will discuss vanc dose. Do not just run vanc daily.   - PT/OT for range of motion and placement of paraffu boot   - Start 81mg ASA   - Lasix gtt at 0.5  - Start D5W at 50cc per hour  - Replace prevena to sternotomy site   - Will place wound vac to right groin site with wound care to complete dressing changes every 3 days   - Remove right pleural and anterior med chest tubes   - Cultures pending        Implant 7/13/22    Medications:  Continuous Infusions:   amiodarone in dextrose 5% 0.5 mg/min (07/21/22 1044)    dexmedetomidine (PRECEDEX) infusion 1 mcg/kg/hr (07/21/22 1044)    dextrose 5 %      EPINEPHrine 0.1 mcg/kg/min (07/21/22 1044)    furosemide (LASIX) 2 mg/mL continuous infusion (non-titrating)      heparin (porcine) in 5 % dex      nitric oxide gas      NORepinephrine bitartrate-D5W 0.04 mcg/kg/min (07/21/22 1053)    vasopressin 0.04 Units/min (07/21/22 1044)     Scheduled Meds:   acetylcysteine 100 mg/ml (10%)  4 mL Nebulization TID WAKE    albuterol sulfate  2.5 mg Nebulization TID WAKE    amiodarone        amiodarone in dextrose        guaiFENesin 100 mg/5 ml  300 mg Per NG tube Q6H    levothyroxine  112 mcg Per OG tube Before breakfast    pantoprazole  40 mg Intravenous BID    piperacillin-tazobactam  (ZOSYN) IVPB  4.5 g Intravenous Q8H    polyethylene glycol  200 mL Per NG tube Q4H    vancomycin (VANCOCIN) IVPB  1,000 mg Intravenous Once    warfarin  3 mg Oral Daily     PRN Meds:sodium chloride 0.9%, sodium chloride 0.9%, acetaminophen, dextrose 10%, dextrose 10%, HYDROmorphone, HYDROmorphone, Pharmacy to dose Vancomycin consult **AND** vancomycin - pharmacy to dose     Objective:     Vital Signs (Most Recent):  Temp: (!) 100.94 °F (38.3 °C) (07/21/22 1039)  Pulse: 103 (07/21/22 1039)  Resp: (!) 27 (07/21/22 0843)  BP: (!) 68/0 (07/21/22 0845)  SpO2: 97 % (07/21/22 0926)   Vital Signs (24h Range):  Temp:  [97.16 °F (36.2 °C)-102.9 °F (39.4 °C)] 100.94 °F (38.3 °C)  Pulse:  [] 103  Resp:  [20-35] 27  SpO2:  [95 %-98 %] 97 %  BP: (68-80)/(0) 68/0  Arterial Line BP: ()/(57-74) 76/67       Intake/Output Summary (Last 24 hours) at 7/21/2022 1058  Last data filed at 7/21/2022 1044  Gross per 24 hour   Intake 4827.44 ml   Output 5720 ml   Net -892.56 ml       Physical Exam  Constitutional:       Comments: Intubated and sedated   HENT:      Head: Normocephalic and atraumatic.      Mouth/Throat:      Comments: OG in place  Eyes:      Pupils: Pupils are equal, round, and reactive to light.   Neck:      Comments: L IJ CVC    Cardiovascular:      Rate and Rhythm: Regular rhythm. Tachycardia present.      Comments:   LVAD in place -- 6400 rpm    Midline sternotomy c/d with dressing in place    2 plerual and 2 mediastinal chest tubes to suction.    V pacing wires in place.   Pulmonary:      Comments: Mechanically ventilated  Abdominal:      General: There is no distension.      Palpations: Abdomen is soft.   Genitourinary:     Comments: Lagunas in place draining clear urine  Musculoskeletal:      Comments: ECMO cannula sites with dressing in place.     Cutdown incision with seroma      Skin:     General: Skin is warm and dry.   Neurological:      Comments: Following commands when sedation paused       Significant  Labs:  ABGs:   Recent Labs   Lab 07/21/22  0735   PH 7.394   PCO2 42.8   PO2 89   HCO3 26.1   POCSATURATED 97   BE 1     Amylase: No results for input(s): AMYLASE in the last 48 hours.  BMP:   Recent Labs   Lab 07/21/22 0352   *   *   K 3.7   *   CO2 24   BUN 60*   CREATININE 2.7*   CALCIUM 9.2   MG 3.2*     Cardiac markers: No results for input(s): CKMB, CPKMB, TROPONINT, TROPONINI, MYOGLOBIN in the last 48 hours.  CBC:   Recent Labs   Lab 07/21/22  0352 07/21/22  0410 07/21/22  0735   WBC 32.22*  --   --    RBC 2.73*  --   --    HGB 7.8*  --   --    HCT 26.0*   < > 25*     --   --    MCV 95  --   --    MCH 28.6  --   --    MCHC 30.0*  --   --     < > = values in this interval not displayed.     CMP:   Recent Labs   Lab 07/21/22 0352   *   CALCIUM 9.2   ALBUMIN 2.0*   PROT 6.1   *   K 3.7   CO2 24   *   BUN 60*   CREATININE 2.7*   ALKPHOS 131   ALT 50*   AST 72*   BILITOT 15.9*     Coagulation:   Recent Labs   Lab 07/21/22 0352 07/21/22  0630   INR 1.4*  --    APTT  --  43.6*     Lactic Acid:   Recent Labs   Lab 07/20/22  0038   LACTATE 1.1     LFTs:   Recent Labs   Lab 07/21/22 0352   ALT 50*   AST 72*   ALKPHOS 131   BILITOT 15.9*   PROT 6.1   ALBUMIN 2.0*     Lipase: No results for input(s): LIPASE in the last 48 hours.    Significant Diagnostics:  I have reviewed all pertinent imaging results/findings within the past 24 hours.    Procedure: Device Interrogation Including analysis of device parameters  Current Settings: Ventricular Assist Device  Review of device function is stable  TXP LVAD INTERROGATIONS 7/21/2022 7/21/2022 7/21/2022 7/21/2022 7/21/2022 7/21/2022 7/21/2022   Type HeartMate3 HeartMate3 HeartMate3 HeartMate3 HeartMate3 HeartMate3 HeartMate3   Flow 5.2 5.3 5.3 5.5 5.3 5.1 5.3   Speed 6200 6250 6250 6200 6200 6200 6200   PI 3.1 2.8 2.8 2.8 3.1 3.1 3.2   Power (Jackson) 5.1 5.0 5.1 5.2 5.2 5.1 5.2   LSL - - - 5800 5800 5800 5800   Pulsatility -  Intermittent pulse Intermittent pulse Intermittent pulse Intermittent pulse Intermittent pulse Intermittent pulse

## 2022-07-21 NOTE — ASSESSMENT & PLAN NOTE
Lab Results   Component Value Date    WBC 32.22 (H) 07/21/2022     -on zosyn and vanc as of 7/20 monitor vanc levels closely   -Plan per primary team

## 2022-07-21 NOTE — ASSESSMENT & PLAN NOTE
Tim Richards is a 56 y.o. male HFrEF with an EF of 10% and a history of HM2 LVAD in 2018 who is now s/p HM3 upgrade, initiation of V-A ECMO after failure to wean off bypass x2      Neuro/Psych:   -- Sedation: Precedex.  -- Pain: PRN Dilaudid             Cards:   -- S/P LVAD exchange on 7/14/22  --Improving pressor requirements, consider weaning vaso   Epi 0.12   Levo 0.03   Vaso 0.04   Dopamine off   Giapreza off  -- Stress dose steroids complete  -- Ventricular pacing wires.  -- MAP goal 75  -- VAD  flows stable  -- Decannulated on 7/19  -- Sternal closure on 7/15  -- EP consult due to v-tach  -- Started on amio due to v-tach/a fib  --Echo       Pulm:   -- Goal O2 sat > 90%  -- ABG PRN  -- Mediastinal chest tubes x2 and pleural chest tube x2 to suction, will remove 2 today  -- Nitric at 10 ppm  -- Leave on vent      Renal:  -- Keep sun for strict I/O  -- Trend BUN/Cr 60/2.7 <-- 66/2.5 <-- 65/2.5 <-- 75/3.3 <-- 77/4.1 <--55/4.2 <-- 31/3.5 <-- 20/2.4  -- Lasix drip at 0.5        FEN / GI:   -- Replace lytes as needed  -- Nutrition: NPO   -- GI ppx: PPI  -- Bowel reg: miralax, docusate, go lytely      ID:   -- Tm: febrile; WBC 32.3 from 26.9  -- ABX vanc, zosyn  -- Cultures sent, no growth to date  -- ID consult  -- daily van levels  -- exchange central line      Heme/Onc:   -- H/H stable   -- Daily CBC  -- Received 18 pRBCs, 16 FFP, 2 plt, 25 cryo; 1500 albumin intra-op  -- No product requirement since sternal closure  -- Heparin gtt at 1600, do not titrate  -- Continue warfarin at 3       Endo:   -- BG goal 140-180  -- Insulin gtt  -- Levothyroxine      PPx:   Feeding: NPO, trickle TF  Analgesia/Sedation: Precedex / PRN Dilaudid  Thromboembolic prevention: SCDs, heparin gtt  HOB >30: Yes  Stress Ulcer ppx: PPI  Glucose control: Critical care goal 140-180 g/dl, ISS     Lines/Drains/Airway: ETT; OG;  RIJ trialysis, LIJ CVC and gertrude, r-radial a-line, Chest tube x4; sun; WV, VAD; midline    Remove  brachial a line, ELEAZAR trialysis     Dispo/Code Status/Palliative:   -- SICU / Full Code.

## 2022-07-21 NOTE — PROGRESS NOTES
[facility]  Critical Care - Surgery  Progress Note    Patient Name: Tim Richards  MRN: [id number]  Admission Date: 6/27/2022  Hospital Length of Stay: 24 days  Code Status: Full Code  Attending Provider: Yg Kaufman MD  Primary Care Provider: Deyanira Booth MD   Principal Problem: Left ventricular assist device (LVAD) complication    Subjective:     Hospital/ICU Course:  No notes on file    Interval History/Significant Events: Febrile with increased pressor requirements overnight. Able to wean slowly early this morning. Now HDS on epi 0.12, levo 0.03, vaso 0.04.     Follow-up For: Procedure(s) (LRB):  CLOSURE, WOUND, STERNUM (N/A)  APPLICATION, WOUND VAC (N/A)  INSERTION, GRAFT, PERICARDIUM (N/A)  IRRIGATION, MEDIASTINUM (N/A)    Post-Operative Day: 6 Days Post-Op    Objective:     Vital Signs (Most Recent):  Temp: (!) 100.94 °F (38.3 °C) (07/21/22 1039)  Pulse: 103 (07/21/22 1039)  Resp: (!) 27 (07/21/22 0843)  BP: (!) 68/0 (07/21/22 0845)  SpO2: 97 % (07/21/22 0926)   Vital Signs (24h Range):  Temp:  [97.16 °F (36.2 °C)-102.9 °F (39.4 °C)] 100.94 °F (38.3 °C)  Pulse:  [] 103  Resp:  [20-35] 27  SpO2:  [95 %-98 %] 97 %  BP: (68-80)/(0) 68/0  Arterial Line BP: ()/(57-74) 76/67     Weight: 106.6 kg (235 lb)  Body mass index is 31.01 kg/m².      Intake/Output Summary (Last 24 hours) at 7/21/2022 1054  Last data filed at 7/21/2022 1044  Gross per 24 hour   Intake 4827.44 ml   Output 5720 ml   Net -892.56 ml       Physical Exam  Constitutional:       Comments: Intubated and sedated   HENT:      Head: Normocephalic and atraumatic.      Mouth/Throat:      Comments: OG in place  Eyes:      Pupils: Pupils are equal, round, and reactive to light.   Neck:      Comments: L IJ CVC  R IJ trialysis  Cardiovascular:      Rate and Rhythm: Regular rhythm. Tachycardia present.      Comments:   LVAD in place -- 6200 rpm, 5.0L    Midline sternotomy c/d with dressing in place    2 plerual  and 2 mediastinal chest tubes to suction.    V pacing wires in place.   Pulmonary:      Comments: Mechanically ventilated  Abdominal:      General: There is no distension.      Palpations: Abdomen is soft.   Genitourinary:     Comments: Lagunas in place draining clear urine  Musculoskeletal:      Comments: ECMO cannula sites with dressing in place.     Cutdown incision with seroma when cannula removed yesterday which has resolved.    L brachial, right radial arterial line   Skin:     General: Skin is warm and dry.   Neurological:      Comments: Following commands when sedation paused       Vents:  Vent Mode: A/C (07/21/22 1039)  Ventilator Initiated: Yes (07/15/22 1027)  Set Rate: 16 BPM (07/21/22 1039)  Vt Set: 400 mL (07/21/22 1039)  Pressure Support: 8 cmH20 (07/21/22 1039)  PEEP/CPAP: 5 cmH20 (07/21/22 1039)  Oxygen Concentration (%): 50 (07/21/22 1039)  Peak Airway Pressure: 23 cmH2O (07/21/22 1039)  Plateau Pressure: 21 cmH20 (07/21/22 1039)  Total Ve: 10.6 mL (07/21/22 1039)  Negative Inspiratory Force (cm H2O): 0 (07/21/22 1039)  F/VT Ratio<105 (RSBI): (!) 97.78 (07/21/22 0734)    Lines/Drains/Airways       Central Venous Catheter Line  Duration             Percutaneous Central Line Insertion/Assessment - Quad Lumen  07/13/22 0941 left internal jugular 8 days              Drain  Duration                  Urethral Catheter 07/13/22 0848 Temperature probe;Latex 14 Fr. 8 days         Chest Tube 07/13/22 1916 1 Right Pleural 32 Fr. 7 days         Chest Tube 07/13/22 1916 2 Left Pleural 32 Fr. 7 days         Chest Tube 07/13/22 1916 3 Anterior Mediastinal 32 Fr. 7 days         Chest Tube 07/13/22 1916 4 Posterior Mediastinal 32 Fr. 7 days         NG/OG Tube 07/15/22 1030 Left nostril 6 days              Airway  Duration                  Airway - Non-Surgical 07/13/22 0848 8 days              Arterial Line  Duration             Arterial Line 07/13/22 2000 Right Radial 7 days              Line  Duration                   VAD 03/08/18 1124 Left ventricular assist device HeartMate II 1595 days         VAD 07/13/22 1300 Left ventricular assist device HeartMate 3 7 days              Peripheral Intravenous Line  Duration                  Midline Catheter Insertion/Assessment  - Single Lumen 06/28/22 1027 Right cephalic vein (lateral side of arm) 20g x 8cm 23 days                    Significant Labs:    CBC/Anemia Profile:  Recent Labs   Lab 07/20/22  1130 07/20/22  1249 07/20/22 1951 07/20/22  2353 07/21/22  0352 07/21/22  0410 07/21/22  0735   WBC 20.15*  --  26.96*  --  32.22*  --   --    HGB 7.6*  --  7.7*  --  7.8*  --   --    HCT 24.7*   < > 25.5*   < > 26.0* 23* 25*     --  200  --  207  --   --    MCV 92  --  95  --  95  --   --    RDW 18.3*  --  18.2*  --  18.6*  --   --     < > = values in this interval not displayed.        Chemistries:  Recent Labs   Lab 07/20/22  1130 07/20/22 1951 07/21/22  0352   * 139 149*   K 4.0 3.6 3.7   * 109 113*   CO2 22* 23 24   BUN 64* 7 60*   CREATININE 2.4* 0.4* 2.7*   CALCIUM 9.4 9.0 9.2   ALBUMIN 1.9* 2.6* 2.0*   PROT 5.9* 4.3* 6.1   BILITOT 14.5* 0.9 15.9*   ALKPHOS 123 110 131   ALT 47* 15 50*   AST 77* 18 72*   MG 3.0* 2.0 3.2*   PHOS 4.3 4.4 4.4       ABGs:   Recent Labs   Lab 07/21/22  0735   PH 7.394   PCO2 42.8   HCO3 26.1   POCSATURATED 97   BE 1     Coagulation:   Recent Labs   Lab 07/21/22  0352 07/21/22  0630   INR 1.4*  --    APTT  --  43.6*     Lactic Acid:   Recent Labs   Lab 07/20/22  0038   LACTATE 1.1     All pertinent labs within the past 24 hours have been reviewed.    Significant Imaging:  I have reviewed all pertinent imaging results/findings within the past 24 hours.    Assessment/Plan:     Chronic combined systolic and diastolic heart failure  Tim Richards is a 56 y.o. male HFrEF with an EF of 10% and a history of HM2 LVAD in 2018 who is now s/p HM3 upgrade, initiation of V-A ECMO after failure to wean off bypass x2      Neuro/Psych:   --  Sedation: Precedex.  -- Pain: PRN Dilaudid             Cards:   -- S/P LVAD exchange on 7/14/22  --Improving pressor requirements, consider weaning vaso   Epi 0.12   Levo 0.03   Vaso 0.04   Dopamine off   Giapreza off  -- Stress dose steroids complete  -- Ventricular pacing wires.  -- MAP goal 75  -- VAD  flows stable  -- Decannulated on 7/19  -- Sternal closure on 7/15  -- EP consult due to v-tach  -- Started on amio due to v-tach/a fib  --Echo       Pulm:   -- Goal O2 sat > 90%  -- ABG PRN  -- Mediastinal chest tubes x2 and pleural chest tube x2 to suction, will remove 2 today  -- Nitric at 10 ppm  -- Leave on vent      Renal:  -- Keep sun for strict I/O  -- Trend BUN/Cr 60/2.7 <-- 66/2.5 <-- 65/2.5 <-- 75/3.3 <-- 77/4.1 <--55/4.2 <-- 31/3.5 <-- 20/2.4  -- Lasix drip at 0.5        FEN / GI:   -- Replace lytes as needed  -- Nutrition: NPO   -- GI ppx: PPI  -- Bowel reg: miralax, docusate, go lytely      ID:   -- Tm: febrile; WBC 32.3 from 26.9  -- ABX vanc, zosyn  -- Cultures sent, no growth to date  -- ID consult  -- daily van levels  -- exchange central line      Heme/Onc:   -- H/H stable   -- Daily CBC  -- Received 18 pRBCs, 16 FFP, 2 plt, 25 cryo; 1500 albumin intra-op  -- No product requirement since sternal closure  -- Heparin gtt at 1600, do not titrate  -- Continue warfarin at 3       Endo:   -- BG goal 140-180  -- Insulin gtt  -- Levothyroxine      PPx:   Feeding: NPO, trickle TF  Analgesia/Sedation: Precedex / PRN Dilaudid  Thromboembolic prevention: SCDs, heparin gtt  HOB >30: Yes  Stress Ulcer ppx: PPI  Glucose control: Critical care goal 140-180 g/dl, ISS     Lines/Drains/Airway: ETT; OG;  RIJ trialysis, LIJ CVC and gertrude, r-radial a-line, Chest tube x4; sun; WV, VAD; midline    Remove brachial a line, RIJ trialysis     Dispo/Code Status/Palliative:   -- SICU / Full Code.         Dang Amezcua MD  Critical Care - Surgery  [facility]

## 2022-07-21 NOTE — ASSESSMENT & PLAN NOTE
#ADHF  #NICMP  Underwent HM III upgrade on 7/13/22, currently on VA ECMO  Currently on Lasix gtt  Currently on Epi gtt  Currently on Heparin gtt  Currently on Precedex gtt  Being treated for possible sepsis  Currently intubated and sedated

## 2022-07-21 NOTE — ASSESSMENT & PLAN NOTE
The patient presented with COVID and found to have an uptrending LDH with increased power.  He underwent HM III upgrade on 7/13/22  -Daily LDH   -Continue Heparin drip    Procedure: Device Interrogation Including analysis of device parameters  Current Settings: Ventricular Assist Device    TXP LVAD INTERROGATIONS 7/21/2022 7/21/2022 7/21/2022 7/21/2022 7/21/2022 7/21/2022 7/21/2022   Type HeartMate3 HeartMate3 HeartMate3 HeartMate3 HeartMate3 HeartMate3 HeartMate3   Flow 5.2 5.3 5.3 5.5 5.3 5.1 5.3   Speed 6200 6250 6250 6200 6200 6200 6200   PI 3.1 2.8 2.8 2.8 3.1 3.1 3.2   Power (Jackson) 5.1 5.0 5.1 5.2 5.2 5.1 5.2   LSL - - - 5800 5800 5800 5800   Pulsatility - Intermittent pulse Intermittent pulse Intermittent pulse Intermittent pulse Intermittent pulse Intermittent pulse

## 2022-07-21 NOTE — HPI
55 yo male with PMH of HFrEF with an EF of 10% s/p HM2 admitted on 6/27 with acute respiratory failure secondary to COVID complicated with LVAD pump thrombosis that was refractory to medical therapy (failed integrillin). Underwent LVAD pump exchange with HM3 on 7/13. Post-op course complicated with worsening cardiogenic shock/RV failure requiring VA-ECMO. Improved clinically underwent successful decannulation. He remains intubated, sedated and requiring pressors and requiring pressor support. On 7/21, EP was initially consulted consulted due to episode of MMVT resulting in SQ-ICD discharge. Discharge successful and restoration of NSR. He was started on amiodarone gtt and restarted on home mexiletine. On 7/24, patient developed more frequent episodes of non sustained VT while on amiodarone PO, lidocaine, and mexiletine. He was restarted on IV amiodarone with improvement of his VT.     On 7/30, EP re consulted for concerns of wide complex tachyarrhythmia. Patient remains intubated, sedated and on low dose levo/epi. Regarding antiarrhythmics he is currently on amiodarone gtt at 0.25, amiodarone 400 mg PO TID, mexiletine 400 mg q8hr. Upon tele review, appears to be in 2:1 atrial flutter with rate of 150. MAPs remain unchanged but nurse did note lower PI reads on VAD.

## 2022-07-21 NOTE — CONSULTS
[facility]  Cardiac Electrophysiology  Consult Note    Admission Date: 6/27/2022  Code Status: Full Code   Attending Provider: Yg Kaufman MD  Consulting Provider: Sebas Adair MD  Principal Problem:Left ventricular assist device (LVAD) complication    Inpatient consult to Electrophysiology  Consult performed by: Sebas Adair MD  Consult ordered by: Josefina Mcclure PA-C        Subjective:     Chief Complaint:  VT     HPI:   57 yo male with previous HM2 LVAD admitted with COVID respiratory failure complicated with LVAD pump thrombosis that was refractory to medical therapy (failed integrillin). Underwent LVAD pump exchange with HM3. Post-op course complicated with worsening cardiogenic shock/RV failure requiring VA-ECMO. Improved clinically and 2 days ago patient underwent successful decannulation. He remains intubated. Sedated. On epi, vaso, levo, and NO. HM3 at a speed of 5500 rpm. Had a fever 2 nights ago. Underwent Bronchoscopy.  Cultures were obtained and NGTD.     Patient had Afib with HR ranging 90-130s overnight, but remained stable. Started on Amiodarone gtt. Had episode of MMVT today around 12:45PM resulting in SQ-ICD discharge. Discharge successful and restoration of NSR with HR around 100. WBC 37K and on Abx for possible infection. WAGNER improving with diuresis.     EP consulted for MMVT.       Past Medical History:   Diagnosis Date    Anticoagulant long-term use     CHF (congestive heart failure)     Class 1 obesity due to excess calories with serious comorbidity and body mass index (BMI) of 31.0 to 31.9 in adult     Dilated cardiomyopathy 1/10/2018    Disorder of kidney and ureter     CKD    Encounter for blood transfusion     Gout     HTN (hypertension)     Hx of psychiatric care     ICD (implantable cardioverter-defibrillator) infection 7/1/2020    Psychiatric problem     Thyroid disease     Ventricular tachycardia (paroxysmal)        Past Surgical  History:   Procedure Laterality Date    AORTIC VALVULOPLASTY N/A 7/13/2022    Procedure: REPAIR, AORTIC VALVE;  Surgeon: Yg Kaufman MD;  Location: [facility] 2ND FLR;  Service: Cardiovascular;  Laterality: N/A;    APPLICATION OF WOUND VACUUM-ASSISTED CLOSURE DEVICE N/A 7/15/2022    Procedure: APPLICATION, WOUND VAC;  Surgeon: Yg Kaufman MD;  Location: [facility] 2ND FLR;  Service: Cardiovascular;  Laterality: N/A;  50 x 5 cm     CARDIAC CATHETERIZATION  Dec. 2012    CARDIAC DEFIBRILLATOR PLACEMENT Left     CRRT-D    COLONOSCOPY N/A 3/6/2018    Procedure: COLONOSCOPY;  Surgeon: Alonso Bone MD;  [facility] [facility]);  Service: Endoscopy;  Laterality: N/A;    COLONOSCOPY N/A 7/17/2019    Procedure: COLONOSCOPY;  Surgeon: Blane Valdez MD;  [facility] (2ND FLR);  Service: Endoscopy;  Laterality: N/A;    COLONOSCOPY N/A 7/18/2019    Procedure: COLONOSCOPY;  Surgeon: Blane Valdez MD;  Location: [facility] (2ND FLR);  Service: Endoscopy;  Laterality: N/A;    ESOPHAGOGASTRODUODENOSCOPY N/A 7/17/2019    Procedure: EGD (ESOPHAGOGASTRODUODENOSCOPY);  Surgeon: Blane Valdez MD;  [facility]: [facility] (2ND FLR);  Service: Endoscopy;  Laterality: N/A;    ESOPHAGOGASTRODUODENOSCOPY N/A 7/18/2019    Procedure: EGD (ESOPHAGOGASTRODUODENOSCOPY);  Surgeon: Blane Valdez MD;  [facility] (2ND FLR);  Service: Endoscopy;  Laterality: N/A;    INSERTION OF GRAFT TO PERICARDIUM N/A 7/15/2022    Procedure: INSERTION, GRAFT, PERICARDIUM;  Surgeon: Yg Kaufman MD;  Location: [facility] FLR;  Service: Cardiovascular;  Laterality: N/A;    IRRIGATION OF MEDIASTINUM N/A 7/15/2022    Procedure: IRRIGATION, MEDIASTINUM;  Surgeon: Yg Kaufman MD;  Location: [facility] 2ND FLR;  Service: Cardiovascular;  Laterality: N/A;    LYSIS OF ADHESIONS  7/13/2022    Procedure: LYSIS, ADHESIONS;  Surgeon: Yg Kaufman MD;  Location: [facility] OR 2ND FLR;  Service: Cardiovascular;;    NONINVASIVE CARDIAC ELECTROPHYSIOLOGY STUDY N/A  10/18/2019    Procedure: CARDIAC ELECTROPHYSIOLOGY STUDY, NONINVASIVE;  Surgeon: Raz Wagner MD;  Location: [facility] EP LAB;  Service: Cardiology;  Laterality: N/A;  VT, DFTs, MDT CRTD in situ, LVAD, juarez, MB, 3098    REPLACEMENT OF IMPLANTABLE CARDIOVERTER-DEFIBRILLATOR (ICD) GENERATOR N/A 3/9/2020    Procedure: REPLACEMENT, ICD GENERATOR;  Surgeon: Harry Yun MD;  Location: [facility] EP LAB;  Service: Cardiology;  Laterality: N/A;  VT, ICD Gen Change and Lead Revision, MDT, MAC, DM,3 Prep    REPLACEMENT OF LEFT VENTRICULAR ASSIST DEVICE (LVAD)  7/13/2022    Procedure: REPLACEMENT, LVAD;  Surgeon: Yg Kaufman MD;  [facility];  Service: Cardiovascular;;    REPLACEMENT OF PUMP N/A 7/13/2022    Procedure: REPLACEMENT, PUMP;  Surgeon: Yg Kaufman MD;  [facility];  Service: Cardiovascular;  Laterality: N/A;  LVAD pump exchange  EXPLANATION OF HEATMATE 2  IMPLANTATION OF HEARTMATE 3  IMPLANTATION OF 8MM CHIMNEY GRAFT TO RFA  INITIATION OF ECMO  TEMPORARY CLOSURE OF CHEST    REVISION OF IMPLANTABLE CARDIOVERTER-DEFIBRILLATOR (ICD) ELECTRODE LEAD PLACEMENT N/A 3/9/2020    Procedure: REVISION, INSERTION, ELECTRODE LEAD, ICD;  Surgeon: Harry Yun MD;  Location: [facility] EP LAB;  Service: Cardiology;  Laterality: N/A;  VT, ICD Gen Change and Lead Revision, MDT, MAC, DM,3 Prep    STERNAL WOUND CLOSURE N/A 7/14/2022    Procedure: CLOSURE, WOUND, STERNUM;  Surgeon: Yg Kaufman MD;  [facility];  Service: Cardiovascular;  Laterality: N/A;  temporary closure  evacuation of hematoma    STERNAL WOUND CLOSURE N/A 7/15/2022    Procedure: CLOSURE, WOUND, STERNUM;  Surgeon: Yg Kaufman MD;  [facility];  Service: Cardiovascular;  Laterality: N/A;    TREATMENT OF CARDIAC ARRHYTHMIA  10/18/2019    Procedure: Cardioversion or Defibrillation;  Surgeon: Raz Wagner MD;  Location: [facility] EP LAB;  Service: Cardiology;;       Review of patient's allergies  indicates:   Allergen Reactions    Lisinopril Anaphylaxis    Hydralazine analogues      Chronic constipation, impotence, dizziness       No current facility-administered medications on file prior to encounter.     Current Outpatient Medications on File Prior to Encounter   Medication Sig    allopurinoL (ZYLOPRIM) 100 MG tablet TAKE 1 TABLET (100 MG) BY MOUTH NIGHTLY.    amiodarone (PACERONE) 200 MG Tab Take 2 tablets (400 mg total) by mouth once daily.    amLODIPine (NORVASC) 10 MG tablet TAKE 1 TABLET BY MOUTH EVERY DAY    aspirin (ECOTRIN) 81 MG EC tablet Take 1 tablet (81 mg total) by mouth once daily.    busPIRone (BUSPAR) 5 MG Tab Take 1 tablet (5 mg total) by mouth 3 (three) times daily.    levothyroxine (SYNTHROID) 112 MCG tablet Take 1 tablet (112 mcg total) by mouth before breakfast.    mexiletine (MEXITIL) 250 MG Cap Take 1 capsule (250 mg total) by mouth every 8 (eight) hours.    pantoprazole (PROTONIX) 40 MG tablet TAKE 1 TABLET (40 MG TOTAL) BY MOUTH DAILY WITH LUNCH.    warfarin (COUMADIN) 5 MG tablet TAKE 7.5 MG ORALLY DAILY EVERY SUNDAY AND THURSDAY, TAKE 5 MG ORALLY DAILY ON OTHER DAYS    [DISCONTINUED] ALPRAZolam (XANAX) 1 MG tablet Take 1 tablet (1 mg total) by mouth daily as needed for Anxiety. Bid prn    [DISCONTINUED] ferrous gluconate (FERGON) 324 MG tablet TAKE 1 TABLET (324 MG TOTAL) BY MOUTH WITH LUNCH.    [DISCONTINUED] sildenafiL (VIAGRA) 100 MG tablet Take 1 tablet (100 mg total) by mouth daily as needed for Erectile Dysfunction.    [DISCONTINUED] torsemide (DEMADEX) 20 MG Tab Take 1 tablet (20 mg total) by mouth once daily.    [DISCONTINUED] zolpidem (AMBIEN CR) 12.5 MG CR tablet Take 1 tablet (12.5 mg total) by mouth nightly as needed for Insomnia.     Family History       Problem Relation (Age of Onset)    Cancer Sister (54)    Coronary artery disease Father    Diabetes Father    Heart disease Father    Hypertension Father    No Known Problems Mother, Brother           Tobacco Use    Smoking status: Former Smoker     Packs/day: 1.00     Years: 31.00     Pack years: 31.00     Types: Cigarettes     Quit date: 2018     Years since quittin.5    Smokeless tobacco: Never Used    Tobacco comment: pt is quiting on his own - pt stated not qualified for program;  pt  quit on his own   Substance and Sexual Activity    Alcohol use: No     Alcohol/week: 0.0 standard drinks     Comment: quit    Drug use: No    Sexual activity: Yes     Partners: Female     Birth control/protection: None     Comment: 10/5/17  with same partner 7 years      Review of Systems   Unable to perform ROS: Intubated   Objective:     Vital Signs (Most Recent):  Temp: (!) 101.1 °F (38.4 °C) (22 1600)  Pulse: 96 (22 1600)  Resp: (!) 24 (22 1208)  BP: (!) 68/0 (22 1304)  SpO2: 97 % (22 1545)   Vital Signs (24h Range):  Temp:  [97.16 °F (36.2 °C)-101.84 °F (38.8 °C)] 101.1 °F (38.4 °C)  Pulse:  [] 96  Resp:  [20-37] 24  SpO2:  [95 %-98 %] 97 %  BP: (68-80)/(0) 68/0  Arterial Line BP: ()/(57-74) 78/68       Weight: 106.6 kg (235 lb)  Body mass index is 31 kg/m².    SpO2: 97 %  O2 Device (Oxygen Therapy): ventilator    Physical Exam  Constitutional:       General: He is not in acute distress.     Appearance: He is obese.   HENT:      Mouth/Throat:      Mouth: Mucous membranes are moist.   Eyes:      Extraocular Movements: Extraocular movements intact.   Cardiovascular:      Rate and Rhythm: Normal rate and regular rhythm.      Pulses: Normal pulses.   Pulmonary:      Effort: No respiratory distress.      Comments: Intubated  Abdominal:      Palpations: Abdomen is soft.      Tenderness: There is no abdominal tenderness.   Musculoskeletal:      Cervical back: Neck supple.      Right lower leg: No edema.      Left lower leg: No edema.   Skin:     General: Skin is warm.   Neurological:      General: No focal deficit present.       Significant Labs: BMP:    Recent Labs   Lab 07/20/22 1951 07/21/22  0352 07/21/22  1217    163* 146*    149* 148*   K 3.6 3.7 3.7    113* 113*   CO2 23 24 25   BUN 7 60* 56*   CREATININE 0.4* 2.7* 2.7*   CALCIUM 9.0 9.2 9.3   MG 2.0 3.2* 3.1*   , CMP:   Recent Labs   Lab 07/20/22 1951 07/21/22  0352 07/21/22  1217    149* 148*   K 3.6 3.7 3.7    113* 113*   CO2 23 24 25    163* 146*   BUN 7 60* 56*   CREATININE 0.4* 2.7* 2.7*   CALCIUM 9.0 9.2 9.3   PROT 4.3* 6.1 6.1   ALBUMIN 2.6* 2.0* 1.9*   BILITOT 0.9 15.9* 14.6*   ALKPHOS 110 131 131   AST 18 72* 66*   ALT 15 50* 45*   ANIONGAP 7* 12 10   ESTGFRAFRICA >60.0 29.3* 29.3*   EGFRNONAA >60.0 25.4* 25.4*   , CBC:   Recent Labs   Lab 07/20/22 1951 07/20/22 2353 07/21/22 0352 07/21/22  0410 07/21/22  1217 07/21/22  1542   WBC 26.96*  --  32.22*  --  34.57*  --    HGB 7.7*  --  7.8*  --  7.9*  --    HCT 25.5*   < > 26.0*   < > 25.9* 24*     --  207  --  226  --     < > = values in this interval not displayed.   , INR:   Recent Labs   Lab 07/20/22 1951 07/21/22 0352 07/21/22 1217   INR 1.3* 1.4* 1.5*   , Lipid Panel No results for input(s): CHOL, HDL, LDLCALC, TRIG, CHOLHDL in the last 48 hours., and Troponin No results for input(s): TROPONINI in the last 48 hours.    Significant Imaging:   TTE 7/21/22  · There is an LVAD present. Base speed is 6400 RPMs. The pump type is a Heartmate III. The interventricular septum appears midline. The aortic valve does not open.  · The estimated ejection fraction is 10%.  · The left ventricle is severely enlarged with eccentric hypertrophy and severely decreased systolic function.  · Indeterminate left ventricular diastolic function.  · Mild right ventricular enlargement with moderately reduced right ventricular systolic function.  · Severe left atrial enlargement.  · Mild mitral regurgitation.  · Mechanically ventilated; cannot use inferior caval vein diameter to estimate central venous  pressure.        Assessment and Plan:     VT (ventricular tachycardia)  Hx of VT/VF s/p ICD 2014. Severe NICM (EF 10%) with HM2 implanted 2018. ICD infected in 2020, so explanted and SQ-ICD ([employer]) 9/2020. Admitted for pump thrombosis and successful HM2 exchange with HM3 on 7/12/22. Course c/b cardiogenic shock/RV failure needing VA ECMO, WAGNER, possible infection. Episode of MMVT today around midday resulting in successful ICD discharge with restoration of NSR on interrogation. Precipitant are likely recent cardiac surgery, infection, electrolyte derangement, and on going shock needing vasopressors.     - Continue Amiodarone gtt @ 0.5 mg/min for 24 hrs, if no recurrence of VT can transition to Amiodarone 400 mg BID x2 weeks then back to home 400 mg daily  - Restart home Mexiletine 250 mg TID  - Continue to wean vasopressors as able and clinically improving  - Can consider starting BB once sock resolves  - Continue Abx for possible underlying infection  - Monitor on telemetry; K>4, Mag>2  - Also possible AF on ICD interrogation, but no 12 lead EKG that clearly demonstrates AF; please obtain EKG if AF recurs      Case discussed with Dr Raleigh Yun MD. Please contact EP if VT recurs.     Thank you for your consult. I will sign off. Please contact us if you have any additional questions.    Sebas Adair MD  Cardiac Electrophysiology  [facility]

## 2022-07-21 NOTE — SUBJECTIVE & OBJECTIVE
Interval History:   The patient was febrile overnight with increased pressor requirements.    CVP: 5  Current drips:  Lasix at 1  Epi at 0.12  Heparin  Precedex at 1    Continuous Infusions:   amiodarone in dextrose 5% 0.5 mg/min (07/21/22 1044)    dexmedetomidine (PRECEDEX) infusion 1 mcg/kg/hr (07/21/22 1044)    dextrose 5 % 50 mL/hr at 07/21/22 1108    EPINEPHrine 0.1 mcg/kg/min (07/21/22 1044)    furosemide (LASIX) 2 mg/mL continuous infusion (non-titrating)      heparin (porcine) in 5 % dex      nitric oxide gas      NORepinephrine bitartrate-D5W 0.04 mcg/kg/min (07/21/22 1053)    vasopressin 0.04 Units/min (07/21/22 1044)     Scheduled Meds:   acetylcysteine 100 mg/ml (10%)  4 mL Nebulization TID WAKE    albuterol sulfate  2.5 mg Nebulization TID WAKE    aspirin  81 mg Per OG tube Daily    guaiFENesin 100 mg/5 ml  300 mg Per NG tube Q6H    levothyroxine  112 mcg Per OG tube Before breakfast    pantoprazole  40 mg Intravenous BID    piperacillin-tazobactam (ZOSYN) IVPB  4.5 g Intravenous Q8H    polyethylene glycol  200 mL Per NG tube Q4H    warfarin  3 mg Oral Daily     PRN Meds:sodium chloride 0.9%, sodium chloride 0.9%, acetaminophen, dextrose 10%, dextrose 10%, HYDROmorphone, HYDROmorphone    Review of patient's allergies indicates:   Allergen Reactions    Lisinopril Anaphylaxis    Hydralazine analogues      Chronic constipation, impotence, dizziness     Objective:     Vital Signs (Most Recent):  Temp: (!) 100.94 °F (38.3 °C) (07/21/22 1039)  Pulse: 103 (07/21/22 1039)  Resp: (!) 27 (07/21/22 0843)  BP: (!) 68/0 (07/21/22 0845)  SpO2: 97 % (07/21/22 0926)   Vital Signs (24h Range):  Temp:  [97.16 °F (36.2 °C)-102.9 °F (39.4 °C)] 100.94 °F (38.3 °C)  Pulse:  [] 103  Resp:  [20-35] 27  SpO2:  [95 %-98 %] 97 %  BP: (68-80)/(0) 68/0  Arterial Line BP: ()/(57-74) 76/67     Patient Vitals for the past 72 hrs (Last 3 readings):   Weight   07/21/22 0500 106.6 kg (235 lb)   07/20/22 0500 104.5 kg (230 lb  6.1 oz)   07/19/22 0500 108.9 kg (240 lb)     Body mass index is 31.01 kg/m².      Intake/Output Summary (Last 24 hours) at 7/21/2022 1132  Last data filed at 7/21/2022 1044  Gross per 24 hour   Intake 4670.41 ml   Output 5495 ml   Net -824.59 ml       Hemodynamic Parameters:       Telemetry: NSVT x 7 beats at the longest, PVCs    Physical Exam  Constitutional:       Appearance: He is obese.      Comments: Intubated, sedated   HENT:      Head: Normocephalic.      Nose: Nose normal.      Mouth/Throat:      Mouth: Mucous membranes are moist.   Eyes:      General: Scleral icterus present.   Cardiovascular:      Rate and Rhythm: Normal rate and regular rhythm.      Comments: VAD hum appreciated  Pulses detected via doppler  Pulmonary:      Comments: Coarse mechanical breath sounds bilaterally  Abdominal:      General: Bowel sounds are normal.      Palpations: Abdomen is soft.   Musculoskeletal:      Cervical back: Neck supple.      Right lower leg: No edema.      Left lower leg: No edema.   Skin:     General: Skin is warm.      Coloration: Skin is jaundiced.   Neurological:      Comments: Following directions while sedated.   Psychiatric:      Comments: Sedated       Significant Labs:  CBC:  Recent Labs   Lab 07/20/22  1130 07/20/22  1249 07/20/22  1951 07/20/22  2353 07/21/22  0352 07/21/22  0410 07/21/22  0735   WBC 20.15*  --  26.96*  --  32.22*  --   --    RBC 2.69*  --  2.69*  --  2.73*  --   --    HGB 7.6*  --  7.7*  --  7.8*  --   --    HCT 24.7*   < > 25.5*   < > 26.0* 23* 25*     --  200  --  207  --   --    MCV 92  --  95  --  95  --   --    MCH 28.3  --  28.6  --  28.6  --   --    MCHC 30.8*  --  30.2*  --  30.0*  --   --     < > = values in this interval not displayed.     BNP:  Recent Labs   Lab 07/15/22  0401 07/17/22  0359 07/20/22  0421   BNP 1,106* 955* 813*     CMP:  Recent Labs   Lab 07/20/22  1130 07/20/22  1951 07/21/22  0352   * 102 163*   CALCIUM 9.4 9.0 9.2   ALBUMIN 1.9* 2.6* 2.0*    PROT 5.9* 4.3* 6.1   * 139 149*   K 4.0 3.6 3.7   CO2 22* 23 24   * 109 113*   BUN 64* 7 60*   CREATININE 2.4* 0.4* 2.7*   ALKPHOS 123 110 131   ALT 47* 15 50*   AST 77* 18 72*   BILITOT 14.5* 0.9 15.9*      Coagulation:   Recent Labs   Lab 07/20/22  1130 07/20/22  1545 07/20/22  1951 07/21/22  0000 07/21/22  0352 07/21/22  0630   INR 1.2  --  1.3*  --  1.4*  --    APTT  --  33.0*  --  42.0*  --  43.6*     LDH:  Recent Labs   Lab 07/19/22  0353 07/20/22  0421 07/21/22  0352   * 578* 783*     Microbiology:  Microbiology Results (last 7 days)       Procedure Component Value Units Date/Time    Culture, VAP (BAL) [[id number]] Collected: 07/20/22 1120    Order Status: Completed Specimen: Bronchial Alveolar Lavage from BAL, RML Updated: 07/21/22 0802     VAP BAL CULTURE Normal respiratory sachin     Gram Stain (Respiratory) Few WBC's     Gram Stain (Respiratory) Rare Gram positive cocci     Gram Stain (Respiratory) Rare Gram negative rods    Blood culture [[id number]] Collected: 07/20/22 1450    Order Status: Completed Specimen: Blood from Wrist, Left Updated: 07/21/22 0115     Blood Culture, Routine No Growth to date    Blood culture [[id number]] Collected: 07/20/22 1445    Order Status: Completed Specimen: Blood from Peripheral, Forearm, Left Updated: 07/21/22 0115     Blood Culture, Routine No Growth to date    Blood culture [[id number]]     Order Status: Canceled Specimen: Blood     Blood culture [[id number]]     Order Status: Canceled Specimen: Blood             I have reviewed all pertinent labs within the past 24 hours.    Estimated Creatinine Clearance: 39.6 mL/min (A) (based on SCr of 2.7 mg/dL (H)).    Diagnostic Results:  I have reviewed and interpreted all pertinent imaging results/findings within the past 24 hours.

## 2022-07-21 NOTE — PROGRESS NOTES
" In patient device check done     Device Type: [employer] S - ICD    Date/Time:  7/21/22 @ 1246 pm    Event Type:  MMVT some undersensing noted at beginning of VT episode    [place]:  Approximately 27 secs    Therapy Delivered:   Shock    Appropriate Therapy:  Yes    Successful:  Yes    Some undersensing noted at beginning of VT episode.Gilles with [employer] reviewed episode and no reprogramming necessary, some undersensing is normal and expected at the start of a lower amplitude fast rhythm.  Once it senses enough beats, it will switch to increasingly aggressive "sensing profiles" depending on rate.    See attached for full report                        "

## 2022-07-21 NOTE — ASSESSMENT & PLAN NOTE
Free water deficit 3.4L  Likely d/t diuretics and not sufficient free water      Recommendations:  -would hold diuretics and provide at least scheduled free water flushes of 400ml Q4H x24 hours   -avoid D5 IV solutions for IVPBs if possible

## 2022-07-21 NOTE — PLAN OF CARE
Speed change to 6400  LSL 6000  Abdominal xray  D/c LIJ quad lumen  D/c Rt upper arm midline  Placed RIJ quad lumen central line  Placed Lt upper arm midline  Held coumadin for possible removal of HM2 driveline tomorrow 7/22/22  Wound vac placed  to rt groin 125 mmHG suction   Changed pervena to chest incision   Bowel movement today   cxr after line placement  Tylenol 1 gm iv for temp 101.5 po  D5 gtt @ 50cc/hr

## 2022-07-21 NOTE — ASSESSMENT & PLAN NOTE
-Ischemic ATN 2/2 decrease perfusion severe hypotension in a patient with known CKD 3b  Baseline sCr: 1.9-2.3  On admission his Cr was: 2.5  Lab Results   Component Value Date    CREATININE 2.7 (H) 07/21/2022     7/15 urine microscopy showed: many granular casts, muddy brown casts, waxy casts, some WBCS, some yeast, and occasional RBCs    Recommendations:   -would hold diuretics today as pt has severe WAGNER, hyperchloremia, with multiple electrolyte abnormalities (hyperNa, HyperCalcemia, HyperMag) and on vancomycin today  -sNa persistently >145->sNa 149, see hyperNa  -corrected Ca 10.9, maybe d/t volume contraction   -Electrolytes: K 3.7, renal diet, page nephrology if K >5.5    Phos level 4.4, no phos binders needed at this time    Mg 2.7->3.2, goal <2.5  -Acid/base: AGMA 2/2 ATN  -Fluid balance: pt is 3rd spacing as his albumin is 2   -Anemia: Hgb 7.8, send anemia panel labs, transfuse for Hgb <7.0  -Strict I/O's and daily weights  -Renal function panel and Mg levels Q8H   -Renal ultrasound reviewed: no hydronephrosis and minimal CKD changes  -Maintain MAP >65 for renal perfusion    There is no immediate indication for KRT at this time

## 2022-07-21 NOTE — SUBJECTIVE & OBJECTIVE
Past Medical History:   Diagnosis Date    Anticoagulant long-term use     CHF (congestive heart failure)     Class 1 obesity due to excess calories with serious comorbidity and body mass index (BMI) of 31.0 to 31.9 in adult     Dilated cardiomyopathy 1/10/2018    Disorder of kidney and ureter     CKD    Encounter for blood transfusion     Gout     HTN (hypertension)     Hx of psychiatric care     ICD (implantable cardioverter-defibrillator) infection 7/1/2020    Psychiatric problem     Thyroid disease     Ventricular tachycardia (paroxysmal)        Past Surgical History:   Procedure Laterality Date    AORTIC VALVULOPLASTY N/A 7/13/2022    Procedure: REPAIR, AORTIC VALVE;  Surgeon: Yg Kaufman MD;  [facility];  Service: Cardiovascular;  Laterality: N/A;    APPLICATION OF WOUND VACUUM-ASSISTED CLOSURE DEVICE N/A 7/15/2022    Procedure: APPLICATION, WOUND VAC;  Surgeon: Yg Kaufman MD;  [facility];  Service: Cardiovascular;  Laterality: N/A;  50 x 5 cm     CARDIAC CATHETERIZATION  Dec. 2012    CARDIAC DEFIBRILLATOR PLACEMENT Left     CRRT-D    COLONOSCOPY N/A 3/6/2018    Procedure: COLONOSCOPY;  Surgeon: Alonso Bone MD;  Location: [facility] (2ND FLR);  Service: Endoscopy;  Laterality: N/A;    COLONOSCOPY N/A 7/17/2019    Procedure: COLONOSCOPY;  Surgeon: Blane Valdez MD;  Location: [facility] ENDO (2ND FLR);  Service: Endoscopy;  Laterality: N/A;    COLONOSCOPY N/A 7/18/2019    Procedure: COLONOSCOPY;  Surgeon: Blane Valdez MD;  Location: [facility] ENDO (2ND FLR);  Service: Endoscopy;  Laterality: N/A;    ESOPHAGOGASTRODUODENOSCOPY N/A 7/17/2019    Procedure: EGD (ESOPHAGOGASTRODUODENOSCOPY);  Surgeon: Blane Valdez MD;  Location: [facility] ENDO (2ND FLR);  Service: Endoscopy;  Laterality: N/A;    ESOPHAGOGASTRODUODENOSCOPY N/A 7/18/2019    Procedure: EGD (ESOPHAGOGASTRODUODENOSCOPY);  Surgeon: Blane Valdez MD;  Location: [facility] ENDO (2ND FLR);  Service: Endoscopy;  Laterality: N/A;    INSERTION OF GRAFT TO  PERICARDIUM N/A 7/15/2022    Procedure: INSERTION, GRAFT, PERICARDIUM;  Surgeon: Yg Kaufman MD;  Location: [facility] OR [facility] FLR;  Service: Cardiovascular;  Laterality: N/A;    IRRIGATION OF MEDIASTINUM N/A 7/15/2022    Procedure: IRRIGATION, MEDIASTINUM;  Surgeon: Yg Kaufman MD;  Location: [facility] OR [facility] FLR;  Service: Cardiovascular;  Laterality: N/A;    LYSIS OF ADHESIONS  7/13/2022    Procedure: LYSIS, ADHESIONS;  Surgeon: Yg Kaufman MD;  [facility];  Service: Cardiovascular;;    NONINVASIVE CARDIAC ELECTROPHYSIOLOGY STUDY N/A 10/18/2019    Procedure: CARDIAC ELECTROPHYSIOLOGY STUDY, NONINVASIVE;  Surgeon: Raz Wagner MD;  Location: [facility] EP LAB;  Service: Cardiology;  Laterality: N/A;  VT, DFTs, MDT CRTD in situ, LVAD, anes, MB, 3098    REPLACEMENT OF IMPLANTABLE CARDIOVERTER-DEFIBRILLATOR (ICD) GENERATOR N/A 3/9/2020    Procedure: REPLACEMENT, ICD GENERATOR;  Surgeon: Harry Yun MD;  Location: [facility] EP LAB;  Service: Cardiology;  Laterality: N/A;  VT, ICD Gen Change and Lead Revision, MDT, MAC, DM,3 Prep    REPLACEMENT OF LEFT VENTRICULAR ASSIST DEVICE (LVAD)  7/13/2022    Procedure: REPLACEMENT, LVAD;  Surgeon: Yg Kaufman MD;  [facility];  Service: Cardiovascular;;    REPLACEMENT OF PUMP N/A 7/13/2022    Procedure: REPLACEMENT, PUMP;  Surgeon: Yg Kaufman MD;  [facility];  Service: Cardiovascular;  Laterality: N/A;  LVAD pump exchange  EXPLANATION OF HEATMATE 2  IMPLANTATION OF HEARTMATE 3  IMPLANTATION OF 8MM CHIMNEY GRAFT TO RFA  INITIATION OF ECMO  TEMPORARY CLOSURE OF CHEST    REVISION OF IMPLANTABLE CARDIOVERTER-DEFIBRILLATOR (ICD) ELECTRODE LEAD PLACEMENT N/A 3/9/2020    Procedure: REVISION, INSERTION, ELECTRODE LEAD, ICD;  Surgeon: Harry Yun MD;  Location: [facility] EP LAB;  Service: Cardiology;  Laterality: N/A;  VT, ICD Gen Change and Lead Revision, MDT, MAC, DM,3 Prep    STERNAL WOUND CLOSURE N/A 7/14/2022    Procedure: CLOSURE,  WOUND, STERNUM;  Surgeon: Yg Kaufman MD;  Location: [facility] OR [facility] FLR;  Service: Cardiovascular;  Laterality: N/A;  temporary closure  evacuation of hematoma    STERNAL WOUND CLOSURE N/A 7/15/2022    Procedure: CLOSURE, WOUND, STERNUM;  Surgeon: Yg Kaufman MD;  Location: [facility] OR [facility] FLR;  Service: Cardiovascular;  Laterality: N/A;    TREATMENT OF CARDIAC ARRHYTHMIA  10/18/2019    Procedure: Cardioversion or Defibrillation;  Surgeon: Raz Wagner MD;  Location: [facility] EP LAB;  Service: Cardiology;;       Review of patient's allergies indicates:   Allergen Reactions    Lisinopril Anaphylaxis    Hydralazine analogues      Chronic constipation, impotence, dizziness       Medications:  Medications Prior to Admission   Medication Sig    allopurinoL (ZYLOPRIM) 100 MG tablet TAKE 1 TABLET (100 MG) BY MOUTH NIGHTLY.    amiodarone (PACERONE) 200 MG Tab Take 2 tablets (400 mg total) by mouth once daily.    amLODIPine (NORVASC) 10 MG tablet TAKE 1 TABLET BY MOUTH EVERY DAY    aspirin (ECOTRIN) 81 MG EC tablet Take 1 tablet (81 mg total) by mouth once daily.    busPIRone (BUSPAR) 5 MG Tab Take 1 tablet (5 mg total) by mouth 3 (three) times daily.    levothyroxine (SYNTHROID) 112 MCG tablet Take 1 tablet (112 mcg total) by mouth before breakfast.    mexiletine (MEXITIL) 250 MG Cap Take 1 capsule (250 mg total) by mouth every 8 (eight) hours.    pantoprazole (PROTONIX) 40 MG tablet TAKE 1 TABLET (40 MG TOTAL) BY MOUTH DAILY WITH LUNCH.    warfarin (COUMADIN) 5 MG tablet TAKE 7.5 MG ORALLY DAILY EVERY SUNDAY AND THURSDAY, TAKE 5 MG ORALLY DAILY ON OTHER DAYS    [DISCONTINUED] ALPRAZolam (XANAX) 1 MG tablet Take 1 tablet (1 mg total) by mouth daily as needed for Anxiety. Bid prn    [DISCONTINUED] zolpidem (AMBIEN CR) 12.5 MG CR tablet Take 1 tablet (12.5 mg total) by mouth nightly as needed for Insomnia.     Antibiotics (From admission, onward)                Start     Stop Route Frequency Ordered    07/20/22 1000   piperacillin-tazobactam 4.5 g in sodium chloride 0.9% 100 mL IVPB (ready to mix system)         -- IV Every 8 hours (non-standard times) 22 0857    22  mupirocin 2 % ointment         2059 Nasl 2 times daily 22 2100  mupirocin 2 % ointment 1 g         2059 Nasl 2 times daily 22 1552    22 2100  mupirocin 2 % ointment         2059 Nasl 2 times daily 22 1810          Antifungals (From admission, onward)                None          Antivirals (From admission, onward)      None             Immunization History   Administered Date(s) Administered    COVID-19, MRNA, LN-S, PF (MODERNA FULL 0.5 ML DOSE) 2021, 2021    COVID-19, MRNA, LN-S, PF (Pfizer) (Purple Cap) 2021    Hepatitis A / Hepatitis B 2018    Influenza 2014, 2019    Influenza - Quadrivalent - PF *Preferred* (6 months and older) 2015, 2016, 10/05/2017, 2021    Influenza - Trivalent - PF (PED) 2014    Pneumococcal Conjugate - 13 Valent 2014    Pneumococcal Polysaccharide - 23 Valent 10/01/2015, 2016    Tdap 2018       Family History       Problem Relation (Age of Onset)    Cancer Sister (54)    Coronary artery disease Father    Diabetes Father    Heart disease Father    Hypertension Father    No Known Problems Mother, Brother          Social History     Socioeconomic History    Marital status:     Number of children: 3   Occupational History     Employer: [employer]    Tobacco Use    Smoking status: Former Smoker     Packs/day: 1.00     Years: 31.00     Pack years: 31.00     Types: Cigarettes     Quit date: 2018     Years since quittin.5    Smokeless tobacco: Never Used    Tobacco comment: pt is quiting on his own - pt stated not qualified for program;  pt  quit on his own   Substance and Sexual Activity    Alcohol use: No     Alcohol/week: 0.0 standard drinks     Comment: quit    Drug  use: No    Sexual activity: Yes     Partners: Female     Birth control/protection: None     Comment: 10/5/17  with same partner 7 years    Social History Narrative    Disabled     Review of Systems   Unable to perform ROS: Intubated   Objective:     Vital Signs (Most Recent):  Temp: (!) 101.48 °F (38.6 °C) (07/21/22 1500)  Pulse: 96 (07/21/22 1515)  Resp: (!) 24 (07/21/22 1208)  BP: (!) 68/0 (07/21/22 1304)  SpO2: 97 % (07/21/22 1208)   Vital Signs (24h Range):  Temp:  [97.16 °F (36.2 °C)-102.9 °F (39.4 °C)] 101.48 °F (38.6 °C)  Pulse:  [] 96  Resp:  [20-37] 24  SpO2:  [95 %-98 %] 97 %  BP: (68-80)/(0) 68/0  Arterial Line BP: ()/(57-74) 79/70     Weight: 106.6 kg (235 lb)  Body mass index is 31 kg/m².    Estimated Creatinine Clearance: 39.6 mL/min (A) (based on SCr of 2.7 mg/dL (H)).    Physical Exam  Constitutional:       General: He is not in acute distress.     Appearance: He is not ill-appearing or toxic-appearing.   HENT:      Head: Normocephalic and atraumatic.      Mouth/Throat:      Comments: ETT  Eyes:      General:         Right eye: No discharge.         Left eye: No discharge.   Cardiovascular:      Comments: VAD hum    Pulmonary:      Effort: Pulmonary effort is normal. No respiratory distress.      Comments: Chest tubes present  Abdominal:      General: There is no distension.      Tenderness: There is no abdominal tenderness.   Genitourinary:     Comments: sun  Musculoskeletal:      Right lower leg: No edema.      Left lower leg: No edema.   Skin:     Findings: No erythema or rash.      Comments: R groin wound vac  RIJ/LIJ - no erythema or induration  R midline - no erythema or induration  Chest wound - dressed/wound vac present     Neurological:      Mental Status: He is alert. He is disoriented.       Significant Labs:   Microbiology Results (last 7 days)       Procedure Component Value Units Date/Time    Culture, VAP (BAL) [[id number]] Collected: 07/20/22 1120    Order Status:  Completed Specimen: Bronchial Alveolar Lavage from BAL, RML Updated: 07/21/22 0802     VAP BAL CULTURE Normal respiratory sachin     Gram Stain (Respiratory) Few WBC's     Gram Stain (Respiratory) Rare Gram positive cocci     Gram Stain (Respiratory) Rare Gram negative rods    Blood culture [[id number]] Collected: 07/20/22 1450    Order Status: Completed Specimen: Blood from Wrist, Left Updated: 07/21/22 0115     Blood Culture, Routine No Growth to date    Blood culture [[id number]] Collected: 07/20/22 1445    Order Status: Completed Specimen: Blood from Peripheral, Forearm, Left Updated: 07/21/22 0115     Blood Culture, Routine No Growth to date    Blood culture [[id number]]     Order Status: Canceled Specimen: Blood     Blood culture [[id number]]     Order Status: Canceled Specimen: Blood             Significant Imaging: I have reviewed all pertinent imaging results/findings within the past 24 hours.

## 2022-07-21 NOTE — ASSESSMENT & PLAN NOTE
Leukocytosis with associated fever post-decannulation. Blcx so far ngtd, resp cx unrevealing. Pt is at risk for fungal infection (prior lines, multiple surgeries).     Recommendations:  -continue empiric vanco pharm to dose  -stop zosyn, switch to renally dosed cefepime to decrease risk of nephrotoxicity   -add empiric bacilio - if no evidence of positive fungal cx x 48hr, would stop  -remove old lines when feasible  -follow up cx data  -if persistently febrile, consider CT c/a/p to evaluate for source

## 2022-07-21 NOTE — PROGRESS NOTES
"[facility]  Nephrology  Progress Note    Patient Name: Tim Richards  MRN: [id number]  Admission Date: 6/27/2022  Hospital Length of Stay: 24 days  Attending Provider: Yg Kaufman MD   Primary Care Physician: Deyanira Booth MD  Principal Problem:Left ventricular assist device (LVAD) complication    Subjective:     HPI:   Tim Richards is a 56 y.o. male w/ Known CKD 3b (without prior nephrology f/u), NICM, end-stage HF, EF 10% s/p HM2 LVAD (2018) and ICD placement (2014), ventricular fibrillation (2020), GI bleed (2019), hypothyroidism, alcohol use (2014), nicotine dependence, and [facility] amitted on 6/27/2022 for evaluation and management of decreased appetite, decreased and dark-colored urine, and increasing shortness of breath.     History obtained from EMR and family at bedside.    In the ED, pt presented with the following VS:   Initial Vitals   BP Pulse Resp Temp SpO2   06/27/22 1129 06/27/22 1038 06/27/22 1038 06/27/22 1038 06/27/22 1810   (!) 94/0 93 20 98 °F (36.7 °C) 95 %      MAP       --                Pt was found to be in acute decompensated heart failure, hypoxic, and severely volume overloaded, for which he was started on BIPAP and on lasix gtt. On 6/30 pt had ventricular tachycardia with ICD shock, this was believed to be 2/2 hypokalemia.   His LVAD was interrogated and there was a concern of LVAD thrombosis and pt was started on Integrilin,however whilke on medical management pt continue with worsening hemolysis and so CTS was consulted and recommended upgrading to HM3 and on 7/13 pt underwent HM 2 explantation, and implantation of HM 3 LVAD. Intraoperatively pt had significant vasoplegic shock and pt was placed on ECMO as well as on vasopressors and steroids for vasodilatory shock. Pt remained intubated postoperatively.   Of note, on admission pt was found to be COVID 19 + and was treated with Remdesivir.     Nephrology was consulted for: "may soon have indication " "for dialysis"  Baseline sCr: 1.9-2.3  On admission his Cr was: 2.5    On 7/13 (day of surgery) his I/Os were as follows: 20L/4.4L, net +15.6L, urine 1965mls and 2.5L from chest tube    On 7/14 to 7/15 his I/Os were: 6.4/5.5L,net 866mls, 1.6L chest tube and 4L urine         Interval History: decannulated from ECMO 07/20/2022  Afebrile overnight   Pt remains intubated FiO2 50%, on heparin gtt and on vasopressin   Off lasix this morning. Last dose of metolazone 10mg overnight. Warfarin started on 7/20  Renal Tube feeds on hold overnight and this morning    sNa  150->149->?139->149 at 04 this morning   Mg->2.9->3.1->?2->3.2  this morning (pt received Mg citrate 07/19)   T.bili 12->10.3->?0.9->15.9     sCr 4.1-->3.3->2.5->2.4->?0.4->2.7 this morning      I/O: 4.7L/4.9L urine and 400ml from chest tube drains, net -1.1L in the last 24 hours    Family at bedside    Review of patient's allergies indicates:   Allergen Reactions    Lisinopril Anaphylaxis    Hydralazine analogues      Chronic constipation, impotence, dizziness     Current Facility-Administered Medications   Medication Frequency    0.9%  NaCl infusion PRN    0.9%  NaCl infusion PRN    acetaminophen oral solution 999.0148 mg Q6H PRN    acetylcysteine 100 mg/ml (10%) solution 4 mL TID WAKE    albuterol sulfate nebulizer solution 2.5 mg TID WAKE    amiodarone (CORDARONE) 360 mg/200 mL (1.8 mg/mL) infusion     amiodarone in dextrose 150 mg/100 mL (1.5 mg/mL) loading dose     calcium gluconate 1 g in NS IVPB (premixed) PRN    calcium gluconate 1 g in NS IVPB (premixed) PRN    calcium gluconate 1 g in NS IVPB (premixed) PRN    dexmedetomidine (PRECEDEX) 400mcg/100mL 0.9% NaCL infusion Continuous    dextrose 10% bolus 125 mL PRN    dextrose 10% bolus 250 mL PRN    EPINEPHrine (ADRENALIN) 10 mg in dextrose 5 % 250 mL infusion Continuous    furosemide (LASIX) 200 mg in dextrose 5 % 100 mL continuous infusion (conc: 2 mg/mL) Continuous    guaiFENesin 12 " hr tablet 600 mg BID    heparin 25,000 units in dextrose 5% 250 mL (100 units/mL) infusion (heparin infusion - NO NOMOGRAM) Continuous    HYDROmorphone injection 0.5 mg Q4H PRN    HYDROmorphone injection 1 mg Q4H PRN    lactulose 20 gram/30 mL solution Soln 20 g TID    levothyroxine tablet 112 mcg Before breakfast    magnesium citrate solution 148 mL Once    magnesium sulfate 2g in water 50mL IVPB (premix) PRN    magnesium sulfate 2g in water 50mL IVPB (premix) PRN    nitric oxide gas Gas 10 ppm Continuous    NORepinephrine 8 mg in dextrose 5% 250 mL infusion Continuous    pantoprazole injection 40 mg BID    piperacillin-tazobactam 4.5 g in sodium chloride 0.9% 100 mL IVPB (ready to mix system) Q8H    polyethylene glycol packet 17 g Daily    potassium chloride 40 mEq in 100 mL IVPB (FOR CENTRAL LINE ADMINISTRATION ONLY) PRN    And    potassium chloride 20 mEq in 100 mL IVPB (FOR CENTRAL LINE ADMINISTRATION ONLY) PRN    And    potassium chloride 40 mEq in 100 mL IVPB (FOR CENTRAL LINE ADMINISTRATION ONLY) PRN    sodium phosphate 15 mmol in dextrose 5 % 250 mL IVPB PRN    sodium phosphate 20.01 mmol in dextrose 5 % 250 mL IVPB PRN    sodium phosphate 30 mmol in dextrose 5 % 250 mL IVPB PRN    vancomycin - pharmacy to dose pharmacy to manage frequency    vasopressin (PITRESSIN) 1 Units/mL in dextrose 5 % 100 mL infusion Continuous    warfarin tablet 3 mg Daily       Objective:     Vital Signs (Most Recent):  Temp: 97.88 °F (36.6 °C) (07/21/22 0645)  Pulse: 106 (07/21/22 0645)  Resp: (!) 22 (07/21/22 0517)  BP: (!) 80/0 (07/21/22 0315)  SpO2: 97 % (07/21/22 0415)  O2 Device (Oxygen Therapy): ventilator (07/21/22 0600)   Vital Signs (24h Range):  Temp:  [96.44 °F (35.8 °C)-102.9 °F (39.4 °C)] 97.88 °F (36.6 °C)  Pulse:  [] 106  Resp:  [20-35] 22  SpO2:  [95 %-98 %] 97 %  BP: (70-80)/(0) 80/0  Arterial Line BP: ()/(57-73) 79/69     Weight: 106.6 kg (235 lb) (07/21/22 0500)  Body mass  index is 31.01 kg/m².  Body surface area is 2.34 meters squared.    I/O last 3 completed shifts:  In: 7211.8 [I.V.:2784; NG/GT:3855; IV Piggyback:572.8]  Out: 7805 [Urine:6595; Drains:650; Chest Tube:560]    Physical Exam  Vitals and nursing note reviewed.   Constitutional:       General: He is in acute distress.      Appearance: He is ill-appearing. He is not toxic-appearing or diaphoretic.   HENT:      Mouth/Throat:      Mouth: Mucous membranes are moist.   Eyes:      General: No scleral icterus.  Cardiovascular:      Rate and Rhythm: Normal rate and regular rhythm.      Pulses: Normal pulses.      Heart sounds: Murmur heard.      Comments: Intubated   + R IJ Trialysis catheter   Pulmonary:      Effort: Pulmonary effort is normal. No respiratory distress.      Breath sounds: Normal breath sounds. No stridor. No wheezing, rhonchi or rales.      Comments: Intubated  Chest tube   Abdominal:      General: There is no distension.      Palpations: There is no mass.   Genitourinary:     Comments: Dark yellow colored urine in sun collection bag   Musculoskeletal:         General: Swelling present. No tenderness, deformity or signs of injury.      Right lower leg: Edema present.      Left lower leg: Edema present.   Skin:     General: Skin is warm.      Capillary Refill: Capillary refill takes 2 to 3 seconds.      Coloration: Skin is pale. Skin is not jaundiced.      Findings: No bruising, erythema, lesion or rash.       Significant Labs:  ABGs:   Recent Labs   Lab 07/20/22  1514   PH 7.432   PCO2 35.2   HCO3 23.5*   POCSATURATED 94*   BE -1       CBC:   Recent Labs   Lab 07/21/22  0352   WBC 32.22*   RBC 2.73*   HGB 7.8*   HCT 26.0*      MCV 95   MCH 28.6   MCHC 30.0*       CMP:   Recent Labs   Lab 07/21/22  0352   *   CALCIUM 9.2   ALBUMIN 2.0*   PROT 6.1   *   K 3.7   CO2 24   *   BUN 60*   CREATININE 2.7*   ALKPHOS 131   ALT 50*   AST 72*   BILITOT 15.9*       LFTs:   Recent Labs   Lab  07/21/22  0352   ALT 50*   AST 72*   ALKPHOS 131   BILITOT 15.9*   PROT 6.1   ALBUMIN 2.0*       All labs within the past 24 hours have been reviewed.     Significant Imaging:  Labs: Reviewed  X-Ray: Reviewed    Assessment/Plan:     WAGNER (acute kidney injury)  -Ischemic ATN 2/2 decrease perfusion severe hypotension in a patient with known CKD 3b  Baseline sCr: 1.9-2.3  On admission his Cr was: 2.5  Lab Results   Component Value Date    CREATININE 2.7 (H) 07/21/2022     7/15 urine microscopy showed: many granular casts, muddy brown casts, waxy casts, some WBCS, some yeast, and occasional RBCs    Recommendations:   -would hold diuretics today as pt has severe WAGNER, hyperchloremia, with multiple electrolyte abnormalities (hyperNa, HyperCalcemia, HyperMag) and on vancomycin today  -sNa persistently >145->sNa 149, see hyperNa  -corrected Ca 10.9, maybe d/t volume contraction   -Electrolytes: K 3.7, renal diet, page nephrology if K >5.5    Phos level 4.4, no phos binders needed at this time    Mg 2.7->3.2, goal <2.5  -Acid/base: AGMA 2/2 ATN  -Fluid balance: pt is 3rd spacing as his albumin is 2   -Anemia: Hgb 7.8, send anemia panel labs, transfuse for Hgb <7.0  -Strict I/O's and daily weights  -Renal function panel and Mg levels Q8H   -Renal ultrasound reviewed: no hydronephrosis and minimal CKD changes  -Maintain MAP >65 for renal perfusion    There is no immediate indication for KRT at this time      Hypernatremia  Free water deficit 3.4L  Likely d/t diuretics and not sufficient free water      Recommendations:  -would hold diuretics and provide at least scheduled free water flushes of 400ml Q4H x24 hours   -if NPO would continue to monitor Na off tube feeds and begin D5W if sNa persistently  >150      Heart replaced by heart assist device  7/13 pt underwent HM 2 explantation, and implantation of HM 3 LVAD  -Plan per primary team       Leukocytosis  Lab Results   Component Value Date    WBC 32.22 (H) 07/21/2022     -on  zosyn and vanc as of 7/20 monitor vanc levels closely   -Plan per primary team       LVAD (left ventricular assist device) present  -Plan per primary team       Chronic combined systolic and diastolic heart failure  -would hold diuretics today  -Plan per primary team         Thank you for your consult. I will follow-up with patient. Please contact us if you have any additional questions.    Holly Spangler MD  Nephrology  [facility]

## 2022-07-21 NOTE — PROGRESS NOTES
[facility]  Cardiothoracic Surgery  Progress Note /Evaluation and Management/VAD interrogation       Patient Name: Tim Richards  MRN: [id number]  Admission Date: 6/27/2022  Hospital Length of Stay: 24 days  Code Status: Full Code   Attending Physician: Yg Kaufman MD   Referring Provider: Self, Aaareferral  Principal Problem:Left ventricular assist device (LVAD) complication      Subjective:     Post-Op Info:  Procedure(s) (LRB):  CLOSURE, WOUND, STERNUM (N/A)  APPLICATION, WOUND VAC (N/A)  INSERTION, GRAFT, PERICARDIUM (N/A)  IRRIGATION, MEDIASTINUM (N/A)   6 Days Post-Op      Reason for Visit:  Patient is seen in follow up management of LVAD pump exchange     Interval History:   - Patient with increasing white count and Tbili.   - ID consulted - consider fungemia   - Liver US ordered  - Vad speed increased to 6400. TTE ordered  - Some VT this AM. EP consulted.   -  Heparin set at 1600 non titrating.   - Will plan to d/c midline after new central line placed.   - Started patient on Levo 0.04 and epi to 0.1.   - Will add 200mL GoLytley Q4.   - Do daily Vanc levels and based on level will discuss vanc dose. Do not just run vanc daily.   - PT/OT for range of motion and placement of paraffu boot   - Start 81mg ASA   - Lasix gtt at 0.5  - Start D5W at 50cc per hour  - Replace prevena to sternotomy site   - Will place wound vac to right groin site with wound care to complete dressing changes every 3 days   - Remove right pleural and anterior med chest tubes   - Cultures pending        Implant 7/13/22    Medications:  Continuous Infusions:   amiodarone in dextrose 5% 0.5 mg/min (07/21/22 1044)    dexmedetomidine (PRECEDEX) infusion 1 mcg/kg/hr (07/21/22 1044)    dextrose 5 %      EPINEPHrine 0.1 mcg/kg/min (07/21/22 1044)    furosemide (LASIX) 2 mg/mL continuous infusion (non-titrating)      heparin (porcine) in 5 % dex      nitric oxide gas      NORepinephrine bitartrate-D5W 0.04  mcg/kg/min (07/21/22 1053)    vasopressin 0.04 Units/min (07/21/22 1044)     Scheduled Meds:   acetylcysteine 100 mg/ml (10%)  4 mL Nebulization TID WAKE    albuterol sulfate  2.5 mg Nebulization TID WAKE    amiodarone        amiodarone in dextrose        guaiFENesin 100 mg/5 ml  300 mg Per NG tube Q6H    levothyroxine  112 mcg Per OG tube Before breakfast    pantoprazole  40 mg Intravenous BID    piperacillin-tazobactam (ZOSYN) IVPB  4.5 g Intravenous Q8H    polyethylene glycol  200 mL Per NG tube Q4H    vancomycin (VANCOCIN) IVPB  1,000 mg Intravenous Once    warfarin  3 mg Oral Daily     PRN Meds:sodium chloride 0.9%, sodium chloride 0.9%, acetaminophen, dextrose 10%, dextrose 10%, HYDROmorphone, HYDROmorphone, Pharmacy to dose Vancomycin consult **AND** vancomycin - pharmacy to dose     Objective:     Vital Signs (Most Recent):  Temp: (!) 100.94 °F (38.3 °C) (07/21/22 1039)  Pulse: 103 (07/21/22 1039)  Resp: (!) 27 (07/21/22 0843)  BP: (!) 68/0 (07/21/22 0845)  SpO2: 97 % (07/21/22 0926)   Vital Signs (24h Range):  Temp:  [97.16 °F (36.2 °C)-102.9 °F (39.4 °C)] 100.94 °F (38.3 °C)  Pulse:  [] 103  Resp:  [20-35] 27  SpO2:  [95 %-98 %] 97 %  BP: (68-80)/(0) 68/0  Arterial Line BP: ()/(57-74) 76/67       Intake/Output Summary (Last 24 hours) at 7/21/2022 1058  Last data filed at 7/21/2022 1044  Gross per 24 hour   Intake 4827.44 ml   Output 5720 ml   Net -892.56 ml       Physical Exam  Constitutional:       Comments: Intubated and sedated   HENT:      Head: Normocephalic and atraumatic.      Mouth/Throat:      Comments: OG in place  Eyes:      Pupils: Pupils are equal, round, and reactive to light.   Neck:      Comments: L IJ CVC    Cardiovascular:      Rate and Rhythm: Regular rhythm. Tachycardia present.      Comments:   LVAD in place -- 6400 rpm    Midline sternotomy c/d with dressing in place    2 plerual and 2 mediastinal chest tubes to suction.    V pacing wires in place.   Pulmonary:       Comments: Mechanically ventilated  Abdominal:      General: There is no distension.      Palpations: Abdomen is soft.   Genitourinary:     Comments: Lagunas in place draining clear urine  Musculoskeletal:      Comments: ECMO cannula sites with dressing in place.     Cutdown incision with seroma      Skin:     General: Skin is warm and dry.   Neurological:      Comments: Following commands when sedation paused       Significant Labs:  ABGs:   Recent Labs   Lab 07/21/22  0735   PH 7.394   PCO2 42.8   PO2 89   HCO3 26.1   POCSATURATED 97   BE 1     Amylase: No results for input(s): AMYLASE in the last 48 hours.  BMP:   Recent Labs   Lab 07/21/22  0352   *   *   K 3.7   *   CO2 24   BUN 60*   CREATININE 2.7*   CALCIUM 9.2   MG 3.2*     Cardiac markers: No results for input(s): CKMB, CPKMB, TROPONINT, TROPONINI, MYOGLOBIN in the last 48 hours.  CBC:   Recent Labs   Lab 07/21/22  0352 07/21/22  0410 07/21/22  0735   WBC 32.22*  --   --    RBC 2.73*  --   --    HGB 7.8*  --   --    HCT 26.0*   < > 25*     --   --    MCV 95  --   --    MCH 28.6  --   --    MCHC 30.0*  --   --     < > = values in this interval not displayed.     CMP:   Recent Labs   Lab 07/21/22 0352   *   CALCIUM 9.2   ALBUMIN 2.0*   PROT 6.1   *   K 3.7   CO2 24   *   BUN 60*   CREATININE 2.7*   ALKPHOS 131   ALT 50*   AST 72*   BILITOT 15.9*     Coagulation:   Recent Labs   Lab 07/21/22 0352 07/21/22  0630   INR 1.4*  --    APTT  --  43.6*     Lactic Acid:   Recent Labs   Lab 07/20/22  0038   LACTATE 1.1     LFTs:   Recent Labs   Lab 07/21/22 0352   ALT 50*   AST 72*   ALKPHOS 131   BILITOT 15.9*   PROT 6.1   ALBUMIN 2.0*     Lipase: No results for input(s): LIPASE in the last 48 hours.    Significant Diagnostics:  I have reviewed all pertinent imaging results/findings within the past 24 hours.    Procedure: Device Interrogation Including analysis of device parameters  Current Settings: Ventricular Assist  Device  Review of device function is stable  TXP LVAD INTERROGATIONS 7/21/2022 7/21/2022 7/21/2022 7/21/2022 7/21/2022 7/21/2022 7/21/2022   Type HeartMate3 HeartMate3 HeartMate3 HeartMate3 HeartMate3 HeartMate3 HeartMate3   Flow 5.2 5.3 5.3 5.5 5.3 5.1 5.3   Speed 6200 6250 6250 6200 6200 6200 6200   PI 3.1 2.8 2.8 2.8 3.1 3.1 3.2   Power (Jackson) 5.1 5.0 5.1 5.2 5.2 5.1 5.2   LSL - - - 5800 5800 5800 5800   Pulsatility - Intermittent pulse Intermittent pulse Intermittent pulse Intermittent pulse Intermittent pulse Intermittent pulse         Assessment/Plan:     LVAD (left ventricular assist device) present  - Interval History was obtained from ICU attending team member during rounding today.  - VAD/DANIELLE teaching was not performed with patient.  - Mobilization/Physical Therapy - PT/OT consulted   - Anticoagulation ongoing  - Decannulated   - WBC 32 from 26  - ID consulted - consider fungemia   - Tbili 15.9. Liver US ordered   - Speed increased to 6400. TTE ordered   - Levo to 0.04 added  - Epi down to 0.1      - Lasix gtt 0.5  - Replace lines   - Heparin gtt to 1600 non titrating   - Golytely added   - EP consult for VT   - Add 81 mg ASA  - Will remove two chest tubes  - Replace prevena and apply wound vac to right groin site   - Start D5W 50cc       More than 50 percent of the care dominated counseling and coordinating care with different team members. The VAD was interrogated and all parameters were found in the history. All these findings are documented in the note above.        Josefina Mcclure PA-C  Cardiothoracic Surgery  [facility]

## 2022-07-21 NOTE — ASSESSMENT & PLAN NOTE
- Interval History was obtained from ICU attending team member during rounding today.  - VAD/DANIELLE teaching was not performed with patient.  - Mobilization/Physical Therapy - PT/OT consulted   - Anticoagulation ongoing  - Decannulated   - WBC 32 from 26  - ID consulted - consider fungemia   - Tbili 15.9. Liver US ordered   - Speed increased to 6400. TTE ordered   - Levo to 0.04 added  - Epi down to 0.1  - Replace lines   - Heparin gtt to 1600 non titrating   - Golytely added   - Daily vanc levels. Will decide daily to run or not based on results   - EP consult for VT   - Add 81 mg ASA  - Will remove two chest tubes  - Replace prevena and apply wound vac to right groin site   - Start D5W 50cc   - Lasix gtt 0.5    More than 50 percent of the care dominated counseling and coordinating care with different team members. The VAD was interrogated and all parameters were found in the history. All these findings are documented in the note above.

## 2022-07-21 NOTE — SUBJECTIVE & OBJECTIVE
Past Medical History:   Diagnosis Date    Anticoagulant long-term use     CHF (congestive heart failure)     Class 1 obesity due to excess calories with serious comorbidity and body mass index (BMI) of 31.0 to 31.9 in adult     Dilated cardiomyopathy 1/10/2018    Disorder of kidney and ureter     CKD    Encounter for blood transfusion     Gout     HTN (hypertension)     Hx of psychiatric care     ICD (implantable cardioverter-defibrillator) infection 7/1/2020    Psychiatric problem     Thyroid disease     Ventricular tachycardia (paroxysmal)        Past Surgical History:   Procedure Laterality Date    AORTIC VALVULOPLASTY N/A 7/13/2022    Procedure: REPAIR, AORTIC VALVE;  Surgeon: Yg Kaufman MD;  [facility];  Service: Cardiovascular;  Laterality: N/A;    APPLICATION OF WOUND VACUUM-ASSISTED CLOSURE DEVICE N/A 7/15/2022    Procedure: APPLICATION, WOUND VAC;  Surgeon: Yg Kaufman MD;  [facility];  Service: Cardiovascular;  Laterality: N/A;  50 x 5 cm     CARDIAC CATHETERIZATION  Dec. 2012    CARDIAC DEFIBRILLATOR PLACEMENT Left     CRRT-D    COLONOSCOPY N/A 3/6/2018    Procedure: COLONOSCOPY;  Surgeon: Alonso Bone MD;  Location: [facility] (2ND FLR);  Service: Endoscopy;  Laterality: N/A;    COLONOSCOPY N/A 7/17/2019    Procedure: COLONOSCOPY;  Surgeon: Blane Valdez MD;  Location: [facility] ENDO (2ND FLR);  Service: Endoscopy;  Laterality: N/A;    COLONOSCOPY N/A 7/18/2019    Procedure: COLONOSCOPY;  Surgeon: Blane Valdez MD;  Location: [facility] ENDO (2ND FLR);  Service: Endoscopy;  Laterality: N/A;    ESOPHAGOGASTRODUODENOSCOPY N/A 7/17/2019    Procedure: EGD (ESOPHAGOGASTRODUODENOSCOPY);  Surgeon: Blane Valdez MD;  Location: [facility] ENDO (2ND FLR);  Service: Endoscopy;  Laterality: N/A;    ESOPHAGOGASTRODUODENOSCOPY N/A 7/18/2019    Procedure: EGD (ESOPHAGOGASTRODUODENOSCOPY);  Surgeon: Blane Valdez MD;  Location: [facility] ENDO (2ND FLR);  Service: Endoscopy;  Laterality: N/A;    INSERTION OF GRAFT TO  PERICARDIUM N/A 7/15/2022    Procedure: INSERTION, GRAFT, PERICARDIUM;  Surgeon: Yg Kaufman MD;  Location: [facility] OR [facility] FLR;  Service: Cardiovascular;  Laterality: N/A;    IRRIGATION OF MEDIASTINUM N/A 7/15/2022    Procedure: IRRIGATION, MEDIASTINUM;  Surgeon: Yg Kaufman MD;  Location: [facility] OR [facility] FLR;  Service: Cardiovascular;  Laterality: N/A;    LYSIS OF ADHESIONS  7/13/2022    Procedure: LYSIS, ADHESIONS;  Surgeon: Yg Kaufman MD;  [facility];  Service: Cardiovascular;;    NONINVASIVE CARDIAC ELECTROPHYSIOLOGY STUDY N/A 10/18/2019    Procedure: CARDIAC ELECTROPHYSIOLOGY STUDY, NONINVASIVE;  Surgeon: Raz Wagner MD;  Location: [facility] EP LAB;  Service: Cardiology;  Laterality: N/A;  VT, DFTs, MDT CRTD in situ, LVAD, anes, MB, 3098    REPLACEMENT OF IMPLANTABLE CARDIOVERTER-DEFIBRILLATOR (ICD) GENERATOR N/A 3/9/2020    Procedure: REPLACEMENT, ICD GENERATOR;  Surgeon: Harry Yun MD;  Location: [facility] EP LAB;  Service: Cardiology;  Laterality: N/A;  VT, ICD Gen Change and Lead Revision, MDT, MAC, DM,3 Prep    REPLACEMENT OF LEFT VENTRICULAR ASSIST DEVICE (LVAD)  7/13/2022    Procedure: REPLACEMENT, LVAD;  Surgeon: Yg Kaufman MD;  [facility];  Service: Cardiovascular;;    REPLACEMENT OF PUMP N/A 7/13/2022    Procedure: REPLACEMENT, PUMP;  Surgeon: Yg Kaufman MD;  [facility];  Service: Cardiovascular;  Laterality: N/A;  LVAD pump exchange  EXPLANATION OF HEATMATE 2  IMPLANTATION OF HEARTMATE 3  IMPLANTATION OF 8MM CHIMNEY GRAFT TO RFA  INITIATION OF ECMO  TEMPORARY CLOSURE OF CHEST    REVISION OF IMPLANTABLE CARDIOVERTER-DEFIBRILLATOR (ICD) ELECTRODE LEAD PLACEMENT N/A 3/9/2020    Procedure: REVISION, INSERTION, ELECTRODE LEAD, ICD;  Surgeon: Harry Yun MD;  Location: [facility] EP LAB;  Service: Cardiology;  Laterality: N/A;  VT, ICD Gen Change and Lead Revision, MDT, MAC, DM,3 Prep    STERNAL WOUND CLOSURE N/A 7/14/2022    Procedure: CLOSURE,  WOUND, STERNUM;  Surgeon: Yg Kaufman MD;  [facility];  Service: Cardiovascular;  Laterality: N/A;  temporary closure  evacuation of hematoma    STERNAL WOUND CLOSURE N/A 7/15/2022    Procedure: CLOSURE, WOUND, STERNUM;  Surgeon: Yg Kaufman MD;  Location: [facility] OR [facility] FLR;  Service: Cardiovascular;  Laterality: N/A;    TREATMENT OF CARDIAC ARRHYTHMIA  10/18/2019    Procedure: Cardioversion or Defibrillation;  Surgeon: Raz Wagner MD;  Location: [facility] EP LAB;  Service: Cardiology;;       Review of patient's allergies indicates:   Allergen Reactions    Lisinopril Anaphylaxis    Hydralazine analogues      Chronic constipation, impotence, dizziness       No current facility-administered medications on file prior to encounter.     Current Outpatient Medications on File Prior to Encounter   Medication Sig    allopurinoL (ZYLOPRIM) 100 MG tablet TAKE 1 TABLET (100 MG) BY MOUTH NIGHTLY.    amiodarone (PACERONE) 200 MG Tab Take 2 tablets (400 mg total) by mouth once daily.    amLODIPine (NORVASC) 10 MG tablet TAKE 1 TABLET BY MOUTH EVERY DAY    aspirin (ECOTRIN) 81 MG EC tablet Take 1 tablet (81 mg total) by mouth once daily.    busPIRone (BUSPAR) 5 MG Tab Take 1 tablet (5 mg total) by mouth 3 (three) times daily.    levothyroxine (SYNTHROID) 112 MCG tablet Take 1 tablet (112 mcg total) by mouth before breakfast.    mexiletine (MEXITIL) 250 MG Cap Take 1 capsule (250 mg total) by mouth every 8 (eight) hours.    pantoprazole (PROTONIX) 40 MG tablet TAKE 1 TABLET (40 MG TOTAL) BY MOUTH DAILY WITH LUNCH.    warfarin (COUMADIN) 5 MG tablet TAKE 7.5 MG ORALLY DAILY EVERY SUNDAY AND THURSDAY, TAKE 5 MG ORALLY DAILY ON OTHER DAYS    [DISCONTINUED] ALPRAZolam (XANAX) 1 MG tablet Take 1 tablet (1 mg total) by mouth daily as needed for Anxiety. Bid prn    [DISCONTINUED] ferrous gluconate (FERGON) 324 MG tablet TAKE 1 TABLET (324 MG TOTAL) BY MOUTH WITH LUNCH.    [DISCONTINUED] sildenafiL (VIAGRA) 100 MG  tablet Take 1 tablet (100 mg total) by mouth daily as needed for Erectile Dysfunction.    [DISCONTINUED] torsemide (DEMADEX) 20 MG Tab Take 1 tablet (20 mg total) by mouth once daily.    [DISCONTINUED] zolpidem (AMBIEN CR) 12.5 MG CR tablet Take 1 tablet (12.5 mg total) by mouth nightly as needed for Insomnia.     Family History       Problem Relation (Age of Onset)    Cancer Sister (54)    Coronary artery disease Father    Diabetes Father    Heart disease Father    Hypertension Father    No Known Problems Mother, Brother          Tobacco Use    Smoking status: Former Smoker     Packs/day: 1.00     Years: 31.00     Pack years: 31.00     Types: Cigarettes     Quit date: 2018     Years since quittin.5    Smokeless tobacco: Never Used    Tobacco comment: pt is quiting on his own - pt stated not qualified for program;  pt  quit on his own   Substance and Sexual Activity    Alcohol use: No     Alcohol/week: 0.0 standard drinks     Comment: quit    Drug use: No    Sexual activity: Yes     Partners: Female     Birth control/protection: None     Comment: 10/5/17  with same partner 7 years      Review of Systems   Unable to perform ROS: Intubated   Objective:     Vital Signs (Most Recent):  Temp: (!) 101.1 °F (38.4 °C) (22 1600)  Pulse: 96 (22 1600)  Resp: (!) 24 (22 1208)  BP: (!) 68/0 (22 1304)  SpO2: 97 % (22 1545)   Vital Signs (24h Range):  Temp:  [97.16 °F (36.2 °C)-101.84 °F (38.8 °C)] 101.1 °F (38.4 °C)  Pulse:  [] 96  Resp:  [20-37] 24  SpO2:  [95 %-98 %] 97 %  BP: (68-80)/(0) 68/0  Arterial Line BP: ()/(57-74) 78/68       Weight: 106.6 kg (235 lb)  Body mass index is 31 kg/m².    SpO2: 97 %  O2 Device (Oxygen Therapy): ventilator    Physical Exam  Constitutional:       General: He is not in acute distress.     Appearance: He is obese.   HENT:      Mouth/Throat:      Mouth: Mucous membranes are moist.   Eyes:      Extraocular Movements: Extraocular  movements intact.   Cardiovascular:      Rate and Rhythm: Normal rate and regular rhythm.      Pulses: Normal pulses.   Pulmonary:      Effort: No respiratory distress.      Comments: Intubated  Abdominal:      Palpations: Abdomen is soft.      Tenderness: There is no abdominal tenderness.   Musculoskeletal:      Cervical back: Neck supple.      Right lower leg: No edema.      Left lower leg: No edema.   Skin:     General: Skin is warm.   Neurological:      General: No focal deficit present.       Significant Labs: BMP:   Recent Labs   Lab 07/20/22 1951 07/21/22 0352 07/21/22  1217    163* 146*    149* 148*   K 3.6 3.7 3.7    113* 113*   CO2 23 24 25   BUN 7 60* 56*   CREATININE 0.4* 2.7* 2.7*   CALCIUM 9.0 9.2 9.3   MG 2.0 3.2* 3.1*   , CMP:   Recent Labs   Lab 07/20/22 1951 07/21/22 0352 07/21/22  1217    149* 148*   K 3.6 3.7 3.7    113* 113*   CO2 23 24 25    163* 146*   BUN 7 60* 56*   CREATININE 0.4* 2.7* 2.7*   CALCIUM 9.0 9.2 9.3   PROT 4.3* 6.1 6.1   ALBUMIN 2.6* 2.0* 1.9*   BILITOT 0.9 15.9* 14.6*   ALKPHOS 110 131 131   AST 18 72* 66*   ALT 15 50* 45*   ANIONGAP 7* 12 10   ESTGFRAFRICA >60.0 29.3* 29.3*   EGFRNONAA >60.0 25.4* 25.4*   , CBC:   Recent Labs   Lab 07/20/22 1951 07/20/22  2353 07/21/22  0352 07/21/22  0410 07/21/22  1217 07/21/22  1542   WBC 26.96*  --  32.22*  --  34.57*  --    HGB 7.7*  --  7.8*  --  7.9*  --    HCT 25.5*   < > 26.0*   < > 25.9* 24*     --  207  --  226  --     < > = values in this interval not displayed.   , INR:   Recent Labs   Lab 07/20/22 1951 07/21/22  0352 07/21/22  1217   INR 1.3* 1.4* 1.5*   , Lipid Panel No results for input(s): CHOL, HDL, LDLCALC, TRIG, CHOLHDL in the last 48 hours., and Troponin No results for input(s): TROPONINI in the last 48 hours.    Significant Imaging:   TTE 7/21/22  There is an LVAD present. Base speed is 6400 RPMs. The pump type is a Heartmate III. The interventricular septum appears  midline. The aortic valve does not open.  The estimated ejection fraction is 10%.  The left ventricle is severely enlarged with eccentric hypertrophy and severely decreased systolic function.  Indeterminate left ventricular diastolic function.  Mild right ventricular enlargement with moderately reduced right ventricular systolic function.  Severe left atrial enlargement.  Mild mitral regurgitation.  Mechanically ventilated; cannot use inferior caval vein diameter to estimate central venous pressure.

## 2022-07-21 NOTE — CONSULTS
[facility] - Surgical Intensive Care  Infectious Disease  Consult Note    Patient Name: Tim Richards  MRN: [id number]  Admission Date: 6/27/2022  Hospital Length of Stay: 24 days  Attending Physician: Yg Kaufman MD  Primary Care Provider: Deyanira Booth MD     Isolation Status: No active isolations    Patient information was obtained from past medical records and ER records.      Inpatient consult to Infectious Diseases  Consult performed by: Jessica Perez MD  Consult ordered by: Josefina Mcclure PA-C        Assessment/Plan:     Leukocytosis  Shock  Leukocytosis with associated fever post-decannulation. Blcx so far ngtd, resp cx unrevealing. Pt is at risk for fungal infection (prior lines, multiple surgeries). Remains critically ill, requiring multiple pressors.     Recommendations:  -continue empiric vanco pharm to dose  -stop zosyn, switch to renally dosed cefepime to decrease risk of nephrotoxicity   -add empiric bacilio - if no evidence of positive fungal cx x 48hr, would stop  -remove old lines when feasible  -follow up cx data  -if persistently febrile, consider CT c/a/p to evaluate for source        Thank you for your consult. I will follow-up with patient. Please contact us if you have any additional questions. Above d/w primary team.     Time: 70 minutes   50% of time spent on face-to-face counseling and coordination of care. Counseling included review of test results, diagnosis, and treatment plan with patient and/or family.        Jessica Perez MD  Infectious Disease  [facility] - Surgical Intensive Care    Subjective:     Principal Problem: Left ventricular assist device (LVAD) complication    HPI: A 56-year-old man with HFrEF-10%, s/p VAD HM2 (March 2018), ICD, VT on amiodarone who developed malaise, anorexia, SOB, dark urine, and soft stools 3-4 days prior to admission. He was evaluated in [facility] ED at which time he tested positive for Covid-19 infection. He was admitted for further  "management and started on Remdesivir treatment protocol. Since admission, he feels significantly improved with bowel movements returning to normal and the shortness of breath subsiding.     Infectious Diseases consulted for "covid infection, acute. Patient with LVAD"    ID reconsulted 7/21 for "sepsis and consideration for fungemia". Since pt was last seen by ID, patient underwent AV repair, redo sternotomy, explant of old lvad HM2 with implantation of HM3, and placed on VA-ECMO, takeback 7/14 for mediastinal washout/hematoma, and washout, sternal closure, creation of moises-pericardium (gerotex membrane) and wound vac placement on 7/15. Decannulated ECMO on 7/19 with subsequent fever and hypotension. Pt was placed on broad spectrum abx. Blcx obtained from 7/20 ngtd. S/p bronch on 7/20 - cx with normal respiratory sachin.             Past Medical History:   Diagnosis Date    Anticoagulant long-term use     CHF (congestive heart failure)     Class 1 obesity due to excess calories with serious comorbidity and body mass index (BMI) of 31.0 to 31.9 in adult     Dilated cardiomyopathy 1/10/2018    Disorder of kidney and ureter     CKD    Encounter for blood transfusion     Gout     HTN (hypertension)     Hx of psychiatric care     ICD (implantable cardioverter-defibrillator) infection 7/1/2020    Psychiatric problem     Thyroid disease     Ventricular tachycardia (paroxysmal)        Past Surgical History:   Procedure Laterality Date    AORTIC VALVULOPLASTY N/A 7/13/2022    Procedure: REPAIR, AORTIC VALVE;  Surgeon: Yg Kaufman MD;  Location: [facility] OR [address] [place] [address];  Service: Cardiovascular;  Laterality: N/A;    APPLICATION OF WOUND VACUUM-ASSISTED CLOSURE DEVICE N/A 7/15/2022    Procedure: APPLICATION, WOUND VAC;  Surgeon: Yg Kaufman MD;  Location: [facility] OR [address] [place] [address];  Service: Cardiovascular;  Laterality: N/A;  50 x 5 cm     CARDIAC CATHETERIZATION  Dec. 2012    CARDIAC DEFIBRILLATOR PLACEMENT Left     CRRT-D "    COLONOSCOPY N/A 3/6/2018    Procedure: COLONOSCOPY;  Surgeon: Alonso Bone MD;  Location: [facility] ENDO (2ND FLR);  Service: Endoscopy;  Laterality: N/A;    COLONOSCOPY N/A 7/17/2019    Procedure: COLONOSCOPY;  Surgeon: Blane Valdez MD;  Location: [facility] ENDO (2ND FLR);  Service: Endoscopy;  Laterality: N/A;    COLONOSCOPY N/A 7/18/2019    Procedure: COLONOSCOPY;  Surgeon: Blane Valdez MD;  Location: [facility] ENDO (2ND FLR);  Service: Endoscopy;  Laterality: N/A;    ESOPHAGOGASTRODUODENOSCOPY N/A 7/17/2019    Procedure: EGD (ESOPHAGOGASTRODUODENOSCOPY);  Surgeon: Blane Valdez MD;  Location: [facility] ENDO (2ND FLR);  Service: Endoscopy;  Laterality: N/A;    ESOPHAGOGASTRODUODENOSCOPY N/A 7/18/2019    Procedure: EGD (ESOPHAGOGASTRODUODENOSCOPY);  Surgeon: Blane Valdez MD;  Location: [facility] ENDO (2ND FLR);  Service: Endoscopy;  Laterality: N/A;    INSERTION OF GRAFT TO PERICARDIUM N/A 7/15/2022    Procedure: INSERTION, GRAFT, PERICARDIUM;  Surgeon: Yg Kaufman MD;  Location: [facility] OR 2ND FLR;  Service: Cardiovascular;  Laterality: N/A;    IRRIGATION OF MEDIASTINUM N/A 7/15/2022    Procedure: IRRIGATION, MEDIASTINUM;  Surgeon: Yg Kaufman MD;  Location: [facility] OR 2ND FLR;  Service: Cardiovascular;  Laterality: N/A;    LYSIS OF ADHESIONS  7/13/2022    Procedure: LYSIS, ADHESIONS;  Surgeon: Yg Kaufman MD;  Location: [facility] OR 2ND FLR;  Service: Cardiovascular;;    NONINVASIVE CARDIAC ELECTROPHYSIOLOGY STUDY N/A 10/18/2019    Procedure: CARDIAC ELECTROPHYSIOLOGY STUDY, NONINVASIVE;  Surgeon: Raz Wagner MD;  Location: [facility] EP LAB;  Service: Cardiology;  Laterality: N/A;  VT, DFTs, MDT CRTD in situ, LVAD, anes, MB, 3098    REPLACEMENT OF IMPLANTABLE CARDIOVERTER-DEFIBRILLATOR (ICD) GENERATOR N/A 3/9/2020    Procedure: REPLACEMENT, ICD GENERATOR;  Surgeon: Harry Yun MD;  Location: [facility] EP LAB;  Service: Cardiology;  Laterality: N/A;  VT, ICD Gen Change and Lead Revision, MDT, MAC, DM,3 Prep    REPLACEMENT OF LEFT  VENTRICULAR ASSIST DEVICE (LVAD)  7/13/2022    Procedure: REPLACEMENT, LVAD;  Surgeon: Yg Kaufman MD;  [facility];  Service: Cardiovascular;;    REPLACEMENT OF PUMP N/A 7/13/2022    Procedure: REPLACEMENT, PUMP;  Surgeon: Yg Kaufman MD;  Location: [facility] OR [facility] FLR;  Service: Cardiovascular;  Laterality: N/A;  LVAD pump exchange  EXPLANATION OF HEATMATE 2  IMPLANTATION OF HEARTMATE 3  IMPLANTATION OF 8MM CHIMNEY GRAFT TO RFA  INITIATION OF ECMO  TEMPORARY CLOSURE OF CHEST    REVISION OF IMPLANTABLE CARDIOVERTER-DEFIBRILLATOR (ICD) ELECTRODE LEAD PLACEMENT N/A 3/9/2020    Procedure: REVISION, INSERTION, ELECTRODE LEAD, ICD;  Surgeon: Harry Yun MD;  Location: [facility] EP LAB;  Service: Cardiology;  Laterality: N/A;  VT, ICD Gen Change and Lead Revision, MDT, MAC, DM,3 Prep    STERNAL WOUND CLOSURE N/A 7/14/2022    Procedure: CLOSURE, WOUND, STERNUM;  Surgeon: Yg Kaufman MD;  [facility];  Service: Cardiovascular;  Laterality: N/A;  temporary closure  evacuation of hematoma    STERNAL WOUND CLOSURE N/A 7/15/2022    Procedure: CLOSURE, WOUND, STERNUM;  Surgeon: Yg Kaufman MD;  [facility];  Service: Cardiovascular;  Laterality: N/A;    TREATMENT OF CARDIAC ARRHYTHMIA  10/18/2019    Procedure: Cardioversion or Defibrillation;  Surgeon: Raz Wagner MD;  Location: [facility] EP LAB;  Service: Cardiology;;       Review of patient's allergies indicates:   Allergen Reactions    Lisinopril Anaphylaxis    Hydralazine analogues      Chronic constipation, impotence, dizziness       Medications:  Medications Prior to Admission   Medication Sig    allopurinoL (ZYLOPRIM) 100 MG tablet TAKE 1 TABLET (100 MG) BY MOUTH NIGHTLY.    amiodarone (PACERONE) 200 MG Tab Take 2 tablets (400 mg total) by mouth once daily.    amLODIPine (NORVASC) 10 MG tablet TAKE 1 TABLET BY MOUTH EVERY DAY    aspirin (ECOTRIN) 81 MG EC tablet Take 1 tablet (81 mg total) by mouth once  daily.    busPIRone (BUSPAR) 5 MG Tab Take 1 tablet (5 mg total) by mouth 3 (three) times daily.    levothyroxine (SYNTHROID) 112 MCG tablet Take 1 tablet (112 mcg total) by mouth before breakfast.    mexiletine (MEXITIL) 250 MG Cap Take 1 capsule (250 mg total) by mouth every 8 (eight) hours.    pantoprazole (PROTONIX) 40 MG tablet TAKE 1 TABLET (40 MG TOTAL) BY MOUTH DAILY WITH LUNCH.    warfarin (COUMADIN) 5 MG tablet TAKE 7.5 MG ORALLY DAILY EVERY SUNDAY AND THURSDAY, TAKE 5 MG ORALLY DAILY ON OTHER DAYS    [DISCONTINUED] ALPRAZolam (XANAX) 1 MG tablet Take 1 tablet (1 mg total) by mouth daily as needed for Anxiety. Bid prn    [DISCONTINUED] zolpidem (AMBIEN CR) 12.5 MG CR tablet Take 1 tablet (12.5 mg total) by mouth nightly as needed for Insomnia.     Antibiotics (From admission, onward)                Start     Stop Route Frequency Ordered    07/20/22 1000  piperacillin-tazobactam 4.5 g in sodium chloride 0.9% 100 mL IVPB (ready to mix system)         -- IV Every 8 hours (non-standard times) 07/20/22 0857    07/13/22 2130  mupirocin 2 % ointment         07/18 2059 Nasl 2 times daily 07/13/22 2035    07/12/22 2100  mupirocin 2 % ointment 1 g         07/13 2059 Nasl 2 times daily 07/12/22 1552    06/27/22 2100  mupirocin 2 % ointment         07/02 2059 Nasl 2 times daily 06/27/22 1810          Antifungals (From admission, onward)                None          Antivirals (From admission, onward)      None             Immunization History   Administered Date(s) Administered    COVID-19, MRNA, LN-S, PF (MODERNA FULL 0.5 ML DOSE) 03/12/2021, 04/09/2021    COVID-19, MRNA, LN-S, PF (Pfizer) (Purple Cap) 12/04/2021    Hepatitis A / Hepatitis B 02/23/2018    Influenza 11/01/2014, 08/17/2019    Influenza - Quadrivalent - PF *Preferred* (6 months and older) 09/23/2015, 09/21/2016, 10/05/2017, 03/05/2021    Influenza - Trivalent - PF (PED) 11/01/2014    Pneumococcal Conjugate - 13 Valent 11/01/2014     Pneumococcal Polysaccharide - 23 Valent 10/01/2015, 2016    Tdap 2018       Family History       Problem Relation (Age of Onset)    Cancer Sister (54)    Coronary artery disease Father    Diabetes Father    Heart disease Father    Hypertension Father    No Known Problems Mother, Brother          Social History     Socioeconomic History    Marital status:     Number of children: 3   Occupational History     Employer: [employer]    Tobacco Use    Smoking status: Former Smoker     Packs/day: 1.00     Years: 31.00     Pack years: 31.00     Types: Cigarettes     Quit date: 2018     Years since quittin.5    Smokeless tobacco: Never Used    Tobacco comment: pt is quiting on his own - pt stated not qualified for program;  pt  quit on his own   Substance and Sexual Activity    Alcohol use: No     Alcohol/week: 0.0 standard drinks     Comment: quit    Drug use: No    Sexual activity: Yes     Partners: Female     Birth control/protection: None     Comment: 10/5/17  with same partner 7 years    Social History Narrative    Disabled     Review of Systems   Unable to perform ROS: Intubated   Objective:     Vital Signs (Most Recent):  Temp: (!) 101.48 °F (38.6 °C) (22 1500)  Pulse: 96 (22 1515)  Resp: (!) 24 (22 1208)  BP: (!) 68/0 (22 1304)  SpO2: 97 % (22 1208)   Vital Signs (24h Range):  Temp:  [97.16 °F (36.2 °C)-102.9 °F (39.4 °C)] 101.48 °F (38.6 °C)  Pulse:  [] 96  Resp:  [20-37] 24  SpO2:  [95 %-98 %] 97 %  BP: (68-80)/(0) 68/0  Arterial Line BP: ()/(57-74) 79/70     Weight: 106.6 kg (235 lb)  Body mass index is 31 kg/m².    Estimated Creatinine Clearance: 39.6 mL/min (A) (based on SCr of 2.7 mg/dL (H)).    Physical Exam  Constitutional:       General: He is not in acute distress.     Appearance: He is not ill-appearing or toxic-appearing.   HENT:      Head: Normocephalic and atraumatic.      Mouth/Throat:      Comments:  ETT  Eyes:      General:         Right eye: No discharge.         Left eye: No discharge.   Cardiovascular:      Comments: VAD hum    Pulmonary:      Effort: Pulmonary effort is normal. No respiratory distress.      Comments: Chest tubes present  Abdominal:      General: There is no distension.      Tenderness: There is no abdominal tenderness.   Genitourinary:     Comments: sun  Musculoskeletal:      Right lower leg: No edema.      Left lower leg: No edema.   Skin:     Findings: No erythema or rash.      Comments: R groin wound vac  RIJ/LIJ - no erythema or induration  R midline - no erythema or induration  Chest wound - dressed     Neurological:      Mental Status: He is alert. He is disoriented.       Significant Labs:   Microbiology Results (last 7 days)       Procedure Component Value Units Date/Time    Culture, VAP (BAL) [[id number]] Collected: 07/20/22 1120    Order Status: Completed Specimen: Bronchial Alveolar Lavage from BAL, RML Updated: 07/21/22 0802     VAP BAL CULTURE Normal respiratory sachin     Gram Stain (Respiratory) Few WBC's     Gram Stain (Respiratory) Rare Gram positive cocci     Gram Stain (Respiratory) Rare Gram negative rods    Blood culture [[id number]] Collected: 07/20/22 1450    Order Status: Completed Specimen: Blood from Wrist, Left Updated: 07/21/22 0115     Blood Culture, Routine No Growth to date    Blood culture [[id number]] Collected: 07/20/22 1445    Order Status: Completed Specimen: Blood from Peripheral, Forearm, Left Updated: 07/21/22 0115     Blood Culture, Routine No Growth to date    Blood culture [[id number]]     Order Status: Canceled Specimen: Blood     Blood culture [[id number]]     Order Status: Canceled Specimen: Blood             Significant Imaging: I have reviewed all pertinent imaging results/findings within the past 24 hours.

## 2022-07-21 NOTE — PROCEDURES
"Tim Richards is a 56 y.o. male patient.    Temp: (!) 101.48 °F (38.6 °C) (07/21/22 1500)  Pulse: 96 (07/21/22 1515)  Resp: (!) 24 (07/21/22 1208)  BP: (!) 68/0 (07/21/22 1304)  SpO2: 97 % (07/21/22 1208)  Weight: 106.6 kg (235 lb) (07/21/22 1304)  Height: 6' 1" (185.4 cm) (07/21/22 1304)       Central Line    Date/Time: 7/21/2022 3:29 PM  Performed by: Dang Amezcua MD  Authorized by: Dang Amezcua MD     Location procedure was performed:  [facility] CRITICAL CARE SURGERY  Consent Done ?:  Yes  Time out complete?: Verified correct patient, procedure, equipment, staff, and site/side    Indications:  Vascular access and hemodynamic monitoring  Anesthesia:  Local infiltration  Local anesthetic:  Lidocaine 1% without epinephrine  Preparation:  Skin prepped with ChloraPrep  Sterile barriers: All five maximal sterile barriers used - gloves, gown, cap, mask and large sterile sheet    Hand hygiene: Hand hygiene performed immediately prior to central venous catheter insertion    Location:  Right internal jugular  Catheter size:  8.5 Fr  Ultrasound guidance: Yes    Needle advanced into vessel with real time ultrasound guidance.    Guidewire confirmed in vessel.    Steril sheath on probe.    Manometry: Yes    Number of attempts:  1  Securement:  Line sutured, chlorhexidine patch, sterile dressing applied and blood return through all ports        7/21/2022  "

## 2022-07-21 NOTE — ASSESSMENT & PLAN NOTE
Hx of VT/VF s/p ICD 2014. Severe NICM (EF 10%) with HM2 implanted 2018. ICD infected in 2020, so explanted and SQ-ICD ([employer]) 9/2020. Admitted for pump thrombosis and successful HM2 exchange with HM3 on 7/12/22. Course c/b cardiogenic shock/RV failure needing VA ECMO, WAGNER, possible infection. Episode of MMVT today around midday resulting in successful ICD discharge with restoration of NSR on interrogation. Precipitant are likely recent cardiac surgery, infection, electrolyte derangement, and on going shock needing vasopressors.     - Continue Amiodarone gtt @ 0.5 mg/min for 24 hrs, if no recurrence of VT can transition to Amiodarone 400 mg BID x2 weeks then back to home 400 mg daily  - Restart home Mexiletine 250 mg TID  - Continue to wean vasopressors as able and clinically improving  - Continue Abx for possible underlying infection  - Monitor on telemetry; K>4, Mag>2  - Also possible AF on ICD interrogation, but no 12 lead EKG that clearly demonstrates AF; please obtain EKG if AF recurs

## 2022-07-21 NOTE — PROGRESS NOTES
Update: Vancomycin was discontinued by the provider.  Pharmacy will sign off, please re-consult as needed.    ________________________________________________________________________________    Pharmacokinetic Assessment Follow Up: IV Vancomycin    Vancomycin Regimen Assessment/Plan:    - Vancomycin random level resulted as 11.3 mg/dl, drawn ~ 14 hours after the previous dose.  Goal 10-20 mg/dl.   - Patient with WAGNER on CKD3, continue pulse dosing.   - Administer vancomycin 1000 mg x 1 dose.   - A random level is ordered with AM labs tomorrow to assess clearance.  Pharmacy to re-dose based on level and renal function.     Drug levels (last 3 results):  Recent Labs   Lab Result Units 07/21/22  0456   Vancomycin, Random ug/mL 11.3       Pharmacy will continue to follow and monitor vancomycin.    Please contact pharmacy at extension [phone] for questions regarding this assessment.    Thank you for the consult,   Cecy Lopez, PharmD, BCCCP       Patient brief summary:  Tim Richards is a 56 y.o. male initiated on antimicrobial therapy with IV Vancomycin for treatment of sepsis    The patient's current regimen is pulse dosing.     Drug Allergies:   Review of patient's allergies indicates:   Allergen Reactions    Lisinopril Anaphylaxis    Hydralazine analogues      Chronic constipation, impotence, dizziness       Actual Body Weight:   106.6 kg    Renal Function:   Estimated Creatinine Clearance: 39.6 mL/min (A) (based on SCr of 2.7 mg/dL (H)).,     Dialysis Method (if applicable):  N/A    CBC (last 72 hours):  Recent Labs   Lab Result Units 07/18/22  1237 07/18/22  1950 07/19/22  0353 07/19/22  1137 07/19/22  1941 07/20/22  0038 07/20/22  0421 07/20/22  1130 07/20/22  1951 07/21/22  0352   WBC K/uL 17.06* 16.01* 15.13* 15.62* 15.75* 16.52* 17.18* 20.15* 26.96* 32.22*   Hemoglobin g/dL 8.1* 7.9* 8.0* 8.0* 7.7* 7.8* 7.5* 7.6* 7.7* 7.8*   Hematocrit % 25.3* 25.7* 25.5* 25.8* 24.6* 25.5* 24.6* 24.7* 25.5* 26.0*    Platelets K/uL 95* 81* 99* 117* 132* 148* 153 186 200 207   Gran % % 84.7* 82.7* 78.4* 78.0* 78.1* 80.4* 80.1* 81.9* 85.6* 86.9*   Lymph % % 3.2* 4.1* 4.9* 4.0* 4.9* 3.8* 5.1* 4.8* 3.7* 3.6*   Mono % % 9.7 10.2 11.6 12.2 11.7 10.7 10.0 10.7 8.0 6.7   Eosinophil % % 0.1 0.3 0.8 0.9 0.6 0.5 0.3 0.1 0.1 0.1   Basophil % % 0.1 0.1 0.1 0.1 0.3 0.1 0.2 0.1 0.1 0.2   Differential Method  Automated Automated Automated Automated Automated Automated Automated Automated Automated Automated       Metabolic Panel (last 72 hours):  Recent Labs   Lab Result Units 07/18/22  1237 07/18/22  1950 07/19/22  0114 07/19/22  0353 07/19/22  1137 07/19/22  1941 07/20/22  0038 07/20/22  0421 07/20/22  1130 07/20/22 1951 07/21/22  0352   Sodium mmol/L 146* 147* 147* 146* 149* 150* 150* 149* 149* 139 149*   Potassium mmol/L 4.1 3.8 3.8 4.2 4.5 4.7 5.0 4.5 4.0 3.6 3.7   Chloride mmol/L 113* 114* 114* 114* 118* 119* 119* 117* 115* 109 113*   CO2 mmol/L 24 24 22* 22* 23 22* 20* 21* 22* 23 24   Glucose mg/dL 140* 149* 154* 142* 134* 130* 134* 167* 123* 102 163*   BUN mg/dL 74* 69* 65* 65* 63* 66* 68* 66* 64* 7 60*   Creatinine mg/dL 3.0* 2.7* 2.5* 2.5* 2.4* 2.3* 2.5* 2.5* 2.4* 0.4* 2.7*   Albumin g/dL 2.1* 2.0* 2.0* 2.0* 2.0* 1.9* 2.0* 2.0*  2.0* 1.9* 2.6* 2.0*   Total Bilirubin mg/dL 10.4* 11.7* 11.7* 12.1* 12.6* 12.4* 12.9* 13.9*  13.9* 14.5* 0.9 15.9*   Alkaline Phosphatase U/L 117 113 113 117 119 121 121 124  124 123 110 131   AST U/L 98* 85* 78* 77* 77* 71* 78* 77*  77* 77* 18 72*   ALT U/L 53* 51* 50* 49* 48* 48* 49* 50*  50* 47* 15 50*   Magnesium mg/dL 2.4 2.7* 2.7* 2.7* 2.8* 2.7* 2.9* 3.1* 3.0* 2.0 3.2*   Phosphorus mg/dL 4.2 3.8 3.4 3.3 3.5 4.0 3.3 4.5 4.3 4.4 4.4       Vancomycin Administrations:  vancomycin given in the last 96 hours                   vancomycin 1.25 g in dextrose 5% 250 mL IVPB (ready to mix) (mg) 1,250 mg New Bag 07/20/22 2450                Microbiologic Results:  Microbiology Results (last 7 days)      Procedure Component Value Units Date/Time    Culture, VAP (BAL) [[id number]] Collected: 07/20/22 1120    Order Status: Completed Specimen: Bronchial Alveolar Lavage from BAL, RML Updated: 07/21/22 0802     VAP BAL CULTURE Normal respiratory sachin     Gram Stain (Respiratory) Few WBC's     Gram Stain (Respiratory) Rare Gram positive cocci     Gram Stain (Respiratory) Rare Gram negative rods    Blood culture [[id number]] Collected: 07/20/22 1450    Order Status: Completed Specimen: Blood from Wrist, Left Updated: 07/21/22 0115     Blood Culture, Routine No Growth to date    Blood culture [[id number]] Collected: 07/20/22 1445    Order Status: Completed Specimen: Blood from Peripheral, Forearm, Left Updated: 07/21/22 0115     Blood Culture, Routine No Growth to date    Blood culture [[id number]]     Order Status: Canceled Specimen: Blood     Blood culture [[id number]]     Order Status: Canceled Specimen: Blood

## 2022-07-21 NOTE — PROGRESS NOTES
07/21/2022  Casper Harris    Current provider:  Yg Kaufman MD    Device interrogation:  TXP LVAD INTERROGATIONS 7/21/2022 7/21/2022 7/21/2022 7/21/2022 7/21/2022 7/21/2022 7/21/2022   Type HeartMate3 HeartMate3 HeartMate3 HeartMate3 HeartMate3 HeartMate3 HeartMate3   Flow 5 5 5.3 5.2 5.2 5.2 5.3   Speed 6400 6400 6450 6400 6400 6200 6250   PI 3.6 3.7 3.3 3.4 3.4 3.1 2.8   Power (Jackson) 5.4 5.4 5.5 5.4 5.4 5.1 5.0   LSL - - - - - - -   Low Flow Alarm - - - - 6000 - -   Pulsatility - - - - - - Intermittent pulse          Rounded on Tim Richards to ensure all mechanical assist device settings (IABP or VAD) were appropriate and all parameters were within limits.  I was able to ensure all back up equipment was present, the staff had no issues, and the Perfusion Department daily rounding was complete.      For implantable VADs: Interrogation of Ventricular assist device was performed with analysis of device parameters and review of device function. I have personally reviewed the interrogation findings and agree with findings as stated.     In emergency, the nursing units have been notified to contact the perfusion department either by:  Calling [email or web address] from 630am to 4pm Mon thru Fri, utilizing the On-Call Finder functionality of Epic and searching for Perfusion, or by contacting the hospital  from 4pm to 630am and on weekends and asking to speak with the perfusionist on call.    3:40 PM

## 2022-07-21 NOTE — SUBJECTIVE & OBJECTIVE
Interval History: decannulated from ECMO 07/20/2022  Afebrile overnight   Pt remains intubated FiO2 50%, on heparin gtt and on vasopressin   Off lasix this morning. Last dose of metolazone 10mg overnight. Warfarin started on 7/20  Renal Tube feeds on hold overnight and this morning    sNa  150->149->?139->149 at 04 this morning   Mg->2.9->3.1->?2->3.2  this morning (pt received Mg citrate 07/19)   T.bili 12->10.3->?0.9->15.9     sCr 4.1-->3.3->2.5->2.4->?0.4->2.7 this morning      I/O: 4.7L/4.9L urine and 400ml from chest tube drains, net -1.1L in the last 24 hours    Family at bedside    Review of patient's allergies indicates:   Allergen Reactions    Lisinopril Anaphylaxis    Hydralazine analogues      Chronic constipation, impotence, dizziness     Current Facility-Administered Medications   Medication Frequency    0.9%  NaCl infusion PRN    0.9%  NaCl infusion PRN    acetaminophen oral solution 999.0148 mg Q6H PRN    acetylcysteine 100 mg/ml (10%) solution 4 mL TID WAKE    albuterol sulfate nebulizer solution 2.5 mg TID WAKE    amiodarone (CORDARONE) 360 mg/200 mL (1.8 mg/mL) infusion     amiodarone in dextrose 150 mg/100 mL (1.5 mg/mL) loading dose     calcium gluconate 1 g in NS IVPB (premixed) PRN    calcium gluconate 1 g in NS IVPB (premixed) PRN    calcium gluconate 1 g in NS IVPB (premixed) PRN    dexmedetomidine (PRECEDEX) 400mcg/100mL 0.9% NaCL infusion Continuous    dextrose 10% bolus 125 mL PRN    dextrose 10% bolus 250 mL PRN    EPINEPHrine (ADRENALIN) 10 mg in dextrose 5 % 250 mL infusion Continuous    furosemide (LASIX) 200 mg in dextrose 5 % 100 mL continuous infusion (conc: 2 mg/mL) Continuous    guaiFENesin 12 hr tablet 600 mg BID    heparin 25,000 units in dextrose 5% 250 mL (100 units/mL) infusion (heparin infusion - NO NOMOGRAM) Continuous    HYDROmorphone injection 0.5 mg Q4H PRN    HYDROmorphone injection 1 mg Q4H PRN    lactulose 20 gram/30 mL solution Soln 20 g TID    levothyroxine tablet  112 mcg Before breakfast    magnesium citrate solution 148 mL Once    magnesium sulfate 2g in water 50mL IVPB (premix) PRN    magnesium sulfate 2g in water 50mL IVPB (premix) PRN    nitric oxide gas Gas 10 ppm Continuous    NORepinephrine 8 mg in dextrose 5% 250 mL infusion Continuous    pantoprazole injection 40 mg BID    piperacillin-tazobactam 4.5 g in sodium chloride 0.9% 100 mL IVPB (ready to mix system) Q8H    polyethylene glycol packet 17 g Daily    potassium chloride 40 mEq in 100 mL IVPB (FOR CENTRAL LINE ADMINISTRATION ONLY) PRN    And    potassium chloride 20 mEq in 100 mL IVPB (FOR CENTRAL LINE ADMINISTRATION ONLY) PRN    And    potassium chloride 40 mEq in 100 mL IVPB (FOR CENTRAL LINE ADMINISTRATION ONLY) PRN    sodium phosphate 15 mmol in dextrose 5 % 250 mL IVPB PRN    sodium phosphate 20.01 mmol in dextrose 5 % 250 mL IVPB PRN    sodium phosphate 30 mmol in dextrose 5 % 250 mL IVPB PRN    vancomycin - pharmacy to dose pharmacy to manage frequency    vasopressin (PITRESSIN) 1 Units/mL in dextrose 5 % 100 mL infusion Continuous    warfarin tablet 3 mg Daily       Objective:     Vital Signs (Most Recent):  Temp: 97.88 °F (36.6 °C) (07/21/22 0645)  Pulse: 106 (07/21/22 0645)  Resp: (!) 22 (07/21/22 0517)  BP: (!) 80/0 (07/21/22 0315)  SpO2: 97 % (07/21/22 0415)  O2 Device (Oxygen Therapy): ventilator (07/21/22 0600)   Vital Signs (24h Range):  Temp:  [96.44 °F (35.8 °C)-102.9 °F (39.4 °C)] 97.88 °F (36.6 °C)  Pulse:  [] 106  Resp:  [20-35] 22  SpO2:  [95 %-98 %] 97 %  BP: (70-80)/(0) 80/0  Arterial Line BP: ()/(57-73) 79/69     Weight: 106.6 kg (235 lb) (07/21/22 0500)  Body mass index is 31.01 kg/m².  Body surface area is 2.34 meters squared.    I/O last 3 completed shifts:  In: 7211.8 [I.V.:2784; NG/GT:3855; IV Piggyback:572.8]  Out: 7805 [Urine:6595; Drains:650; Chest Tube:560]    Physical Exam  Vitals and nursing note reviewed.   Constitutional:       General: He is in acute distress.       Appearance: He is ill-appearing. He is not toxic-appearing or diaphoretic.   HENT:      Mouth/Throat:      Mouth: Mucous membranes are moist.   Eyes:      General: No scleral icterus.  Cardiovascular:      Rate and Rhythm: Normal rate and regular rhythm.      Pulses: Normal pulses.      Heart sounds: Murmur heard.      Comments: Intubated   + R IJ Trialysis catheter   Pulmonary:      Effort: Pulmonary effort is normal. No respiratory distress.      Breath sounds: Normal breath sounds. No stridor. No wheezing, rhonchi or rales.      Comments: Intubated  Chest tube   Abdominal:      General: There is no distension.      Palpations: There is no mass.   Genitourinary:     Comments: Dark yellow colored urine in sun collection bag   Musculoskeletal:         General: Swelling present. No tenderness, deformity or signs of injury.      Right lower leg: Edema present.      Left lower leg: Edema present.   Skin:     General: Skin is warm.      Capillary Refill: Capillary refill takes 2 to 3 seconds.      Coloration: Skin is pale. Skin is not jaundiced.      Findings: No bruising, erythema, lesion or rash.       Significant Labs:  ABGs:   Recent Labs   Lab 07/20/22  1514   PH 7.432   PCO2 35.2   HCO3 23.5*   POCSATURATED 94*   BE -1       CBC:   Recent Labs   Lab 07/21/22  0352   WBC 32.22*   RBC 2.73*   HGB 7.8*   HCT 26.0*      MCV 95   MCH 28.6   MCHC 30.0*       CMP:   Recent Labs   Lab 07/21/22  0352   *   CALCIUM 9.2   ALBUMIN 2.0*   PROT 6.1   *   K 3.7   CO2 24   *   BUN 60*   CREATININE 2.7*   ALKPHOS 131   ALT 50*   AST 72*   BILITOT 15.9*       LFTs:   Recent Labs   Lab 07/21/22  0352   ALT 50*   AST 72*   ALKPHOS 131   BILITOT 15.9*   PROT 6.1   ALBUMIN 2.0*       All labs within the past 24 hours have been reviewed.     Significant Imaging:  Labs: Reviewed  X-Ray: Reviewed

## 2022-07-22 LAB
ALBUMIN SERPL BCP-MCNC: 1.7 G/DL (ref 3.5–5.2)
ALBUMIN SERPL BCP-MCNC: 1.7 G/DL (ref 3.5–5.2)
ALBUMIN SERPL BCP-MCNC: 1.8 G/DL (ref 3.5–5.2)
ALLENS TEST: ABNORMAL
ALLENS TEST: NORMAL
ALP SERPL-CCNC: 104 U/L (ref 55–135)
ALP SERPL-CCNC: 105 U/L (ref 55–135)
ALP SERPL-CCNC: 118 U/L (ref 55–135)
ALP SERPL-CCNC: 120 U/L (ref 55–135)
ALP SERPL-CCNC: 120 U/L (ref 55–135)
ALT SERPL W/O P-5'-P-CCNC: 34 U/L (ref 10–44)
ALT SERPL W/O P-5'-P-CCNC: 35 U/L (ref 10–44)
ALT SERPL W/O P-5'-P-CCNC: 38 U/L (ref 10–44)
ALT SERPL W/O P-5'-P-CCNC: 38 U/L (ref 10–44)
ALT SERPL W/O P-5'-P-CCNC: 41 U/L (ref 10–44)
ANION GAP SERPL CALC-SCNC: 10 MMOL/L (ref 8–16)
ANION GAP SERPL CALC-SCNC: 11 MMOL/L (ref 8–16)
ANION GAP SERPL CALC-SCNC: 12 MMOL/L (ref 8–16)
ANION GAP SERPL CALC-SCNC: 12 MMOL/L (ref 8–16)
ANISOCYTOSIS BLD QL SMEAR: SLIGHT
ANISOCYTOSIS BLD QL SMEAR: SLIGHT
APTT BLDCRRT: 36.8 SEC (ref 21–32)
APTT BLDCRRT: 38.1 SEC (ref 21–32)
APTT BLDCRRT: 40.3 SEC (ref 21–32)
AST SERPL-CCNC: 49 U/L (ref 10–40)
AST SERPL-CCNC: 53 U/L (ref 10–40)
AST SERPL-CCNC: 57 U/L (ref 10–40)
AST SERPL-CCNC: 57 U/L (ref 10–40)
AST SERPL-CCNC: 59 U/L (ref 10–40)
BASOPHILS # BLD AUTO: 0.03 K/UL (ref 0–0.2)
BASOPHILS # BLD AUTO: 0.03 K/UL (ref 0–0.2)
BASOPHILS # BLD AUTO: 0.04 K/UL (ref 0–0.2)
BASOPHILS NFR BLD: 0.1 % (ref 0–1.9)
BILIRUB DIRECT SERPL-MCNC: 9.8 MG/DL (ref 0.1–0.3)
BILIRUB SERPL-MCNC: 10.5 MG/DL (ref 0.1–1)
BILIRUB SERPL-MCNC: 10.9 MG/DL (ref 0.1–1)
BILIRUB SERPL-MCNC: 12.9 MG/DL (ref 0.1–1)
BILIRUB SERPL-MCNC: 12.9 MG/DL (ref 0.1–1)
BILIRUB SERPL-MCNC: 13.5 MG/DL (ref 0.1–1)
BNP SERPL-MCNC: 2579 PG/ML (ref 0–99)
BUN SERPL-MCNC: 47 MG/DL (ref 6–20)
BUN SERPL-MCNC: 49 MG/DL (ref 6–20)
BUN SERPL-MCNC: 50 MG/DL (ref 6–20)
BUN SERPL-MCNC: 53 MG/DL (ref 6–20)
CA-I BLDV-SCNC: 1.13 MMOL/L (ref 1.06–1.42)
CA-I BLDV-SCNC: 1.2 MMOL/L (ref 1.06–1.42)
CALCIUM SERPL-MCNC: 8.8 MG/DL (ref 8.7–10.5)
CALCIUM SERPL-MCNC: 8.8 MG/DL (ref 8.7–10.5)
CALCIUM SERPL-MCNC: 9.2 MG/DL (ref 8.7–10.5)
CALCIUM SERPL-MCNC: 9.3 MG/DL (ref 8.7–10.5)
CHLORIDE SERPL-SCNC: 109 MMOL/L (ref 95–110)
CHLORIDE SERPL-SCNC: 110 MMOL/L (ref 95–110)
CO2 SERPL-SCNC: 21 MMOL/L (ref 23–29)
CO2 SERPL-SCNC: 22 MMOL/L (ref 23–29)
CO2 SERPL-SCNC: 22 MMOL/L (ref 23–29)
CO2 SERPL-SCNC: 24 MMOL/L (ref 23–29)
CREAT SERPL-MCNC: 2.4 MG/DL (ref 0.5–1.4)
CREAT SERPL-MCNC: 2.6 MG/DL (ref 0.5–1.4)
CREAT SERPL-MCNC: 2.6 MG/DL (ref 0.5–1.4)
CREAT SERPL-MCNC: 2.8 MG/DL (ref 0.5–1.4)
CRP SERPL-MCNC: 256.1 MG/L (ref 0–8.2)
DELSYS: ABNORMAL
DELSYS: NORMAL
DIFFERENTIAL METHOD: ABNORMAL
EOSINOPHIL # BLD AUTO: 0 K/UL (ref 0–0.5)
EOSINOPHIL NFR BLD: 0.1 % (ref 0–8)
ERYTHROCYTE [DISTWIDTH] IN BLOOD BY AUTOMATED COUNT: 18.6 % (ref 11.5–14.5)
ERYTHROCYTE [DISTWIDTH] IN BLOOD BY AUTOMATED COUNT: 18.7 % (ref 11.5–14.5)
ERYTHROCYTE [DISTWIDTH] IN BLOOD BY AUTOMATED COUNT: 18.8 % (ref 11.5–14.5)
ERYTHROCYTE [SEDIMENTATION RATE] IN BLOOD BY WESTERGREN METHOD: 16 MM/H
EST. GFR  (AFRICAN AMERICAN): 28.1 ML/MIN/1.73 M^2
EST. GFR  (AFRICAN AMERICAN): 30.7 ML/MIN/1.73 M^2
EST. GFR  (AFRICAN AMERICAN): 30.7 ML/MIN/1.73 M^2
EST. GFR  (AFRICAN AMERICAN): 33.8 ML/MIN/1.73 M^2
EST. GFR  (NON AFRICAN AMERICAN): 24.3 ML/MIN/1.73 M^2
EST. GFR  (NON AFRICAN AMERICAN): 26.6 ML/MIN/1.73 M^2
EST. GFR  (NON AFRICAN AMERICAN): 26.6 ML/MIN/1.73 M^2
EST. GFR  (NON AFRICAN AMERICAN): 29.3 ML/MIN/1.73 M^2
FIBRINOGEN PPP-MCNC: >800 MG/DL (ref 182–400)
FIO2: 50
GLUCOSE SERPL-MCNC: 111 MG/DL (ref 70–110)
GLUCOSE SERPL-MCNC: 112 MG/DL (ref 70–110)
GLUCOSE SERPL-MCNC: 123 MG/DL (ref 70–110)
GLUCOSE SERPL-MCNC: 141 MG/DL (ref 70–110)
HCO3 UR-SCNC: 23.4 MMOL/L (ref 24–28)
HCO3 UR-SCNC: 24 MMOL/L (ref 24–28)
HCO3 UR-SCNC: 24.4 MMOL/L (ref 24–28)
HCO3 UR-SCNC: 25.1 MMOL/L (ref 24–28)
HCT VFR BLD AUTO: 21.6 % (ref 40–54)
HCT VFR BLD AUTO: 22.2 % (ref 40–54)
HCT VFR BLD AUTO: 23.8 % (ref 40–54)
HCT VFR BLD CALC: 22 %PCV (ref 36–54)
HCT VFR BLD CALC: 23 %PCV (ref 36–54)
HCT VFR BLD CALC: 23 %PCV (ref 36–54)
HCT VFR BLD CALC: 24 %PCV (ref 36–54)
HGB BLD-MCNC: 6.8 G/DL (ref 14–18)
HGB BLD-MCNC: 6.9 G/DL (ref 14–18)
HGB BLD-MCNC: 7.4 G/DL (ref 14–18)
HYPOCHROMIA BLD QL SMEAR: ABNORMAL
HYPOCHROMIA BLD QL SMEAR: ABNORMAL
IMM GRANULOCYTES # BLD AUTO: 0.84 K/UL (ref 0–0.04)
IMM GRANULOCYTES # BLD AUTO: 0.85 K/UL (ref 0–0.04)
IMM GRANULOCYTES # BLD AUTO: 0.99 K/UL (ref 0–0.04)
IMM GRANULOCYTES NFR BLD AUTO: 2.9 % (ref 0–0.5)
IMM GRANULOCYTES NFR BLD AUTO: 3 % (ref 0–0.5)
IMM GRANULOCYTES NFR BLD AUTO: 3.1 % (ref 0–0.5)
INR PPP: 1.4 (ref 0.8–1.2)
INR PPP: 1.6 (ref 0.8–1.2)
INR PPP: 1.7 (ref 0.8–1.2)
LDH SERPL L TO P-CCNC: 0.77 MMOL/L (ref 0.36–1.25)
LDH SERPL L TO P-CCNC: 0.91 MMOL/L (ref 0.36–1.25)
LDH SERPL L TO P-CCNC: 0.93 MMOL/L (ref 0.36–1.25)
LDH SERPL L TO P-CCNC: 0.93 MMOL/L (ref 0.36–1.25)
LDH SERPL L TO P-CCNC: 541 U/L (ref 110–260)
LYMPHOCYTES # BLD AUTO: 0.7 K/UL (ref 1–4.8)
LYMPHOCYTES # BLD AUTO: 1.1 K/UL (ref 1–4.8)
LYMPHOCYTES # BLD AUTO: 1.3 K/UL (ref 1–4.8)
LYMPHOCYTES NFR BLD: 2.5 % (ref 18–48)
LYMPHOCYTES NFR BLD: 3.7 % (ref 18–48)
LYMPHOCYTES NFR BLD: 4.1 % (ref 18–48)
MAGNESIUM SERPL-MCNC: 2.6 MG/DL (ref 1.6–2.6)
MAGNESIUM SERPL-MCNC: 2.7 MG/DL (ref 1.6–2.6)
MAGNESIUM SERPL-MCNC: 2.9 MG/DL (ref 1.6–2.6)
MAGNESIUM SERPL-MCNC: 2.9 MG/DL (ref 1.6–2.6)
MCH RBC QN AUTO: 28.2 PG (ref 27–31)
MCH RBC QN AUTO: 28.6 PG (ref 27–31)
MCH RBC QN AUTO: 28.9 PG (ref 27–31)
MCHC RBC AUTO-ENTMCNC: 31.1 G/DL (ref 32–36)
MCHC RBC AUTO-ENTMCNC: 31.1 G/DL (ref 32–36)
MCHC RBC AUTO-ENTMCNC: 31.5 G/DL (ref 32–36)
MCV RBC AUTO: 90 FL (ref 82–98)
MCV RBC AUTO: 92 FL (ref 82–98)
MCV RBC AUTO: 93 FL (ref 82–98)
METHEMOGLOBIN: 1.1 % (ref 0–3)
MODE: ABNORMAL
MODE: NORMAL
MONOCYTES # BLD AUTO: 1.7 K/UL (ref 0.3–1)
MONOCYTES # BLD AUTO: 1.8 K/UL (ref 0.3–1)
MONOCYTES # BLD AUTO: 2.1 K/UL (ref 0.3–1)
MONOCYTES NFR BLD: 5.8 % (ref 4–15)
MONOCYTES NFR BLD: 6.3 % (ref 4–15)
MONOCYTES NFR BLD: 6.5 % (ref 4–15)
NEUTROPHILS # BLD AUTO: 25 K/UL (ref 1.8–7.7)
NEUTROPHILS # BLD AUTO: 25.6 K/UL (ref 1.8–7.7)
NEUTROPHILS # BLD AUTO: 27.3 K/UL (ref 1.8–7.7)
NEUTROPHILS NFR BLD: 86.1 % (ref 38–73)
NEUTROPHILS NFR BLD: 86.8 % (ref 38–73)
NEUTROPHILS NFR BLD: 88.6 % (ref 38–73)
NRBC BLD-RTO: 0 /100 WBC
NRBC BLD-RTO: 0 /100 WBC
NRBC BLD-RTO: 1 /100 WBC
OVALOCYTES BLD QL SMEAR: ABNORMAL
OVALOCYTES BLD QL SMEAR: ABNORMAL
PCO2 BLDA: 34.6 MMHG (ref 35–45)
PCO2 BLDA: 35.1 MMHG (ref 35–45)
PCO2 BLDA: 39.5 MMHG (ref 35–45)
PCO2 BLDA: 42.1 MMHG (ref 35–45)
PEEP: 5
PH SMN: 7.38 [PH] (ref 7.35–7.45)
PH SMN: 7.4 [PH] (ref 7.35–7.45)
PH SMN: 7.43 [PH] (ref 7.35–7.45)
PH SMN: 7.45 [PH] (ref 7.35–7.45)
PHOSPHATE SERPL-MCNC: 3.4 MG/DL (ref 2.7–4.5)
PHOSPHATE SERPL-MCNC: 3.5 MG/DL (ref 2.7–4.5)
PHOSPHATE SERPL-MCNC: 3.8 MG/DL (ref 2.7–4.5)
PHOSPHATE SERPL-MCNC: 4.1 MG/DL (ref 2.7–4.5)
PLATELET # BLD AUTO: 258 K/UL (ref 150–450)
PLATELET # BLD AUTO: 265 K/UL (ref 150–450)
PLATELET # BLD AUTO: 287 K/UL (ref 150–450)
PLATELET BLD QL SMEAR: ABNORMAL
PMV BLD AUTO: 12.7 FL (ref 9.2–12.9)
PMV BLD AUTO: 12.8 FL (ref 9.2–12.9)
PMV BLD AUTO: 13 FL (ref 9.2–12.9)
PO2 BLDA: 100 MMHG (ref 80–100)
PO2 BLDA: 72 MMHG (ref 80–100)
PO2 BLDA: 81 MMHG (ref 80–100)
PO2 BLDA: 87 MMHG (ref 80–100)
POC BE: -1 MMOL/L
POC BE: 0 MMOL/L
POC IONIZED CALCIUM: 1.15 MMOL/L (ref 1.06–1.42)
POC IONIZED CALCIUM: 1.17 MMOL/L (ref 1.06–1.42)
POC IONIZED CALCIUM: 1.18 MMOL/L (ref 1.06–1.42)
POC IONIZED CALCIUM: 1.21 MMOL/L (ref 1.06–1.42)
POC SATURATED O2: 94 % (ref 95–100)
POC SATURATED O2: 96 % (ref 95–100)
POC SATURATED O2: 97 % (ref 95–100)
POC SATURATED O2: 98 % (ref 95–100)
POC TCO2: 24 MMOL/L (ref 23–27)
POC TCO2: 25 MMOL/L (ref 23–27)
POC TCO2: 26 MMOL/L (ref 23–27)
POC TCO2: 26 MMOL/L (ref 23–27)
POIKILOCYTOSIS BLD QL SMEAR: SLIGHT
POIKILOCYTOSIS BLD QL SMEAR: SLIGHT
POLYCHROMASIA BLD QL SMEAR: ABNORMAL
POLYCHROMASIA BLD QL SMEAR: ABNORMAL
POTASSIUM BLD-SCNC: 3.7 MMOL/L (ref 3.5–5.1)
POTASSIUM BLD-SCNC: 3.8 MMOL/L (ref 3.5–5.1)
POTASSIUM SERPL-SCNC: 3.7 MMOL/L (ref 3.5–5.1)
POTASSIUM SERPL-SCNC: 3.8 MMOL/L (ref 3.5–5.1)
POTASSIUM SERPL-SCNC: 3.9 MMOL/L (ref 3.5–5.1)
POTASSIUM SERPL-SCNC: 4.1 MMOL/L (ref 3.5–5.1)
PROT SERPL-MCNC: 6 G/DL (ref 6–8.4)
PROT SERPL-MCNC: 6.1 G/DL (ref 6–8.4)
PROT SERPL-MCNC: 6.2 G/DL (ref 6–8.4)
PROT SERPL-MCNC: 6.2 G/DL (ref 6–8.4)
PROT SERPL-MCNC: 6.4 G/DL (ref 6–8.4)
PROTHROMBIN TIME: 14.6 SEC (ref 9–12.5)
PROTHROMBIN TIME: 16.2 SEC (ref 9–12.5)
PROTHROMBIN TIME: 17.2 SEC (ref 9–12.5)
RBC # BLD AUTO: 2.41 M/UL (ref 4.6–6.2)
RBC # BLD AUTO: 2.41 M/UL (ref 4.6–6.2)
RBC # BLD AUTO: 2.56 M/UL (ref 4.6–6.2)
SAMPLE: ABNORMAL
SAMPLE: NORMAL
SITE: ABNORMAL
SITE: NORMAL
SODIUM BLD-SCNC: 144 MMOL/L (ref 136–145)
SODIUM BLD-SCNC: 144 MMOL/L (ref 136–145)
SODIUM BLD-SCNC: 145 MMOL/L (ref 136–145)
SODIUM BLD-SCNC: 145 MMOL/L (ref 136–145)
SODIUM SERPL-SCNC: 142 MMOL/L (ref 136–145)
SODIUM SERPL-SCNC: 142 MMOL/L (ref 136–145)
SODIUM SERPL-SCNC: 143 MMOL/L (ref 136–145)
SODIUM SERPL-SCNC: 144 MMOL/L (ref 136–145)
SPHEROCYTES BLD QL SMEAR: ABNORMAL
SPHEROCYTES BLD QL SMEAR: ABNORMAL
VANCOMYCIN SERPL-MCNC: 6.5 UG/ML
VT: 550
WBC # BLD AUTO: 28.79 K/UL (ref 3.9–12.7)
WBC # BLD AUTO: 28.94 K/UL (ref 3.9–12.7)
WBC # BLD AUTO: 31.7 K/UL (ref 3.9–12.7)

## 2022-07-22 PROCEDURE — 84132 ASSAY OF SERUM POTASSIUM: CPT

## 2022-07-22 PROCEDURE — 63600175 PHARM REV CODE 636 W HCPCS: Performed by: STUDENT IN AN ORGANIZED HEALTH CARE EDUCATION/TRAINING PROGRAM

## 2022-07-22 PROCEDURE — 25000003 PHARM REV CODE 250: Performed by: PHYSICIAN ASSISTANT

## 2022-07-22 PROCEDURE — 63600367 HC NITRIC OXIDE PER HOUR

## 2022-07-22 PROCEDURE — 99232 SBSQ HOSP IP/OBS MODERATE 35: CPT | Mod: ,,, | Performed by: INTERNAL MEDICINE

## 2022-07-22 PROCEDURE — 25000003 PHARM REV CODE 250: Performed by: STUDENT IN AN ORGANIZED HEALTH CARE EDUCATION/TRAINING PROGRAM

## 2022-07-22 PROCEDURE — 36000708 HC OR TIME LEV III 1ST 15 MIN: Performed by: THORACIC SURGERY (CARDIOTHORACIC VASCULAR SURGERY)

## 2022-07-22 PROCEDURE — 84295 ASSAY OF SERUM SODIUM: CPT

## 2022-07-22 PROCEDURE — 85730 THROMBOPLASTIN TIME PARTIAL: CPT | Performed by: THORACIC SURGERY (CARDIOTHORACIC VASCULAR SURGERY)

## 2022-07-22 PROCEDURE — 85610 PROTHROMBIN TIME: CPT | Mod: 91 | Performed by: THORACIC SURGERY (CARDIOTHORACIC VASCULAR SURGERY)

## 2022-07-22 PROCEDURE — 63600175 PHARM REV CODE 636 W HCPCS: Performed by: PHYSICIAN ASSISTANT

## 2022-07-22 PROCEDURE — 83605 ASSAY OF LACTIC ACID: CPT

## 2022-07-22 PROCEDURE — 27000221 HC OXYGEN, UP TO 24 HOURS

## 2022-07-22 PROCEDURE — 85384 FIBRINOGEN ACTIVITY: CPT | Performed by: THORACIC SURGERY (CARDIOTHORACIC VASCULAR SURGERY)

## 2022-07-22 PROCEDURE — 80076 HEPATIC FUNCTION PANEL: CPT | Performed by: STUDENT IN AN ORGANIZED HEALTH CARE EDUCATION/TRAINING PROGRAM

## 2022-07-22 PROCEDURE — 80053 COMPREHEN METABOLIC PANEL: CPT | Performed by: THORACIC SURGERY (CARDIOTHORACIC VASCULAR SURGERY)

## 2022-07-22 PROCEDURE — 83880 ASSAY OF NATRIURETIC PEPTIDE: CPT | Performed by: STUDENT IN AN ORGANIZED HEALTH CARE EDUCATION/TRAINING PROGRAM

## 2022-07-22 PROCEDURE — 94761 N-INVAS EAR/PLS OXIMETRY MLT: CPT

## 2022-07-22 PROCEDURE — 27200966 HC CLOSED SUCTION SYSTEM

## 2022-07-22 PROCEDURE — 63600175 PHARM REV CODE 636 W HCPCS

## 2022-07-22 PROCEDURE — 99291 PR CRITICAL CARE, E/M 30-74 MINUTES: ICD-10-PCS | Mod: ,,, | Performed by: ANESTHESIOLOGY

## 2022-07-22 PROCEDURE — 82330 ASSAY OF CALCIUM: CPT | Mod: 91 | Performed by: THORACIC SURGERY (CARDIOTHORACIC VASCULAR SURGERY)

## 2022-07-22 PROCEDURE — 99233 PR SUBSEQUENT HOSPITAL CARE,LEVL III: ICD-10-PCS | Mod: ,,, | Performed by: STUDENT IN AN ORGANIZED HEALTH CARE EDUCATION/TRAINING PROGRAM

## 2022-07-22 PROCEDURE — 25000003 PHARM REV CODE 250

## 2022-07-22 PROCEDURE — 88300 SURGICAL PATH GROSS: CPT | Mod: 26,,, | Performed by: PATHOLOGY

## 2022-07-22 PROCEDURE — 25000242 PHARM REV CODE 250 ALT 637 W/ HCPCS: Performed by: STUDENT IN AN ORGANIZED HEALTH CARE EDUCATION/TRAINING PROGRAM

## 2022-07-22 PROCEDURE — 85347 COAGULATION TIME ACTIVATED: CPT

## 2022-07-22 PROCEDURE — 99232 PR SUBSEQUENT HOSPITAL CARE,LEVL II: ICD-10-PCS | Mod: ,,, | Performed by: INTERNAL MEDICINE

## 2022-07-22 PROCEDURE — 99291 CRITICAL CARE FIRST HOUR: CPT | Mod: ,,, | Performed by: ANESTHESIOLOGY

## 2022-07-22 PROCEDURE — 20000000 HC ICU ROOM

## 2022-07-22 PROCEDURE — 80202 ASSAY OF VANCOMYCIN: CPT | Performed by: PHYSICIAN ASSISTANT

## 2022-07-22 PROCEDURE — 82330 ASSAY OF CALCIUM: CPT

## 2022-07-22 PROCEDURE — 10120 INC&RMVL FB SUBQ TISS SMPL: CPT | Mod: 78,,, | Performed by: THORACIC SURGERY (CARDIOTHORACIC VASCULAR SURGERY)

## 2022-07-22 PROCEDURE — 36000709 HC OR TIME LEV III EA ADD 15 MIN: Performed by: THORACIC SURGERY (CARDIOTHORACIC VASCULAR SURGERY)

## 2022-07-22 PROCEDURE — 84100 ASSAY OF PHOSPHORUS: CPT | Mod: 91 | Performed by: THORACIC SURGERY (CARDIOTHORACIC VASCULAR SURGERY)

## 2022-07-22 PROCEDURE — 99291 PR CRITICAL CARE, E/M 30-74 MINUTES: ICD-10-PCS | Mod: ,,, | Performed by: INTERNAL MEDICINE

## 2022-07-22 PROCEDURE — 83735 ASSAY OF MAGNESIUM: CPT | Mod: 91 | Performed by: THORACIC SURGERY (CARDIOTHORACIC VASCULAR SURGERY)

## 2022-07-22 PROCEDURE — 63600175 PHARM REV CODE 636 W HCPCS: Performed by: THORACIC SURGERY (CARDIOTHORACIC VASCULAR SURGERY)

## 2022-07-22 PROCEDURE — C9113 INJ PANTOPRAZOLE SODIUM, VIA: HCPCS

## 2022-07-22 PROCEDURE — 85014 HEMATOCRIT: CPT

## 2022-07-22 PROCEDURE — 93750 INTERROGATION VAD IN PERSON: CPT | Mod: ,,, | Performed by: INTERNAL MEDICINE

## 2022-07-22 PROCEDURE — 94640 AIRWAY INHALATION TREATMENT: CPT

## 2022-07-22 PROCEDURE — 93750 PR INTERROGATE VENT ASSIST DEV, IN PERSON, W PHYSICIAN ANALYSIS: ICD-10-PCS | Mod: ,,, | Performed by: INTERNAL MEDICINE

## 2022-07-22 PROCEDURE — 88300 SURGICAL PATH GROSS: CPT | Performed by: PATHOLOGY

## 2022-07-22 PROCEDURE — 99900026 HC AIRWAY MAINTENANCE (STAT)

## 2022-07-22 PROCEDURE — 25000003 PHARM REV CODE 250: Performed by: THORACIC SURGERY (CARDIOTHORACIC VASCULAR SURGERY)

## 2022-07-22 PROCEDURE — 82803 BLOOD GASES ANY COMBINATION: CPT

## 2022-07-22 PROCEDURE — 99900035 HC TECH TIME PER 15 MIN (STAT)

## 2022-07-22 PROCEDURE — 83615 LACTATE (LD) (LDH) ENZYME: CPT | Performed by: STUDENT IN AN ORGANIZED HEALTH CARE EDUCATION/TRAINING PROGRAM

## 2022-07-22 PROCEDURE — 87040 BLOOD CULTURE FOR BACTERIA: CPT | Performed by: THORACIC SURGERY (CARDIOTHORACIC VASCULAR SURGERY)

## 2022-07-22 PROCEDURE — 37799 UNLISTED PX VASCULAR SURGERY: CPT

## 2022-07-22 PROCEDURE — 86140 C-REACTIVE PROTEIN: CPT | Performed by: STUDENT IN AN ORGANIZED HEALTH CARE EDUCATION/TRAINING PROGRAM

## 2022-07-22 PROCEDURE — 10120 PR REMOVE FOREIGN BODY SIMPLE: ICD-10-PCS | Mod: 78,,, | Performed by: THORACIC SURGERY (CARDIOTHORACIC VASCULAR SURGERY)

## 2022-07-22 PROCEDURE — 99233 SBSQ HOSP IP/OBS HIGH 50: CPT | Mod: ,,, | Performed by: STUDENT IN AN ORGANIZED HEALTH CARE EDUCATION/TRAINING PROGRAM

## 2022-07-22 PROCEDURE — 99291 CRITICAL CARE FIRST HOUR: CPT | Mod: ,,, | Performed by: INTERNAL MEDICINE

## 2022-07-22 PROCEDURE — 85025 COMPLETE CBC W/AUTO DIFF WBC: CPT | Performed by: THORACIC SURGERY (CARDIOTHORACIC VASCULAR SURGERY)

## 2022-07-22 PROCEDURE — 27000248 HC VAD-ADDITIONAL DAY

## 2022-07-22 PROCEDURE — 94003 VENT MGMT INPAT SUBQ DAY: CPT

## 2022-07-22 PROCEDURE — 88300 PR  SURG PATH,GROSS,LEVEL I: ICD-10-PCS | Mod: 26,,, | Performed by: PATHOLOGY

## 2022-07-22 RX ORDER — POTASSIUM CHLORIDE 14.9 MG/ML
20 INJECTION INTRAVENOUS ONCE
Status: COMPLETED | OUTPATIENT
Start: 2022-07-22 | End: 2022-07-22

## 2022-07-22 RX ORDER — ACETAMINOPHEN 10 MG/ML
1000 INJECTION, SOLUTION INTRAVENOUS ONCE
Status: COMPLETED | OUTPATIENT
Start: 2022-07-22 | End: 2022-07-22

## 2022-07-22 RX ORDER — CALCIUM GLUCONATE 20 MG/ML
1 INJECTION, SOLUTION INTRAVENOUS ONCE
Status: COMPLETED | OUTPATIENT
Start: 2022-07-22 | End: 2022-07-22

## 2022-07-22 RX ORDER — FENTANYL CITRATE 50 UG/ML
100 INJECTION, SOLUTION INTRAMUSCULAR; INTRAVENOUS ONCE
Status: COMPLETED | OUTPATIENT
Start: 2022-07-22 | End: 2022-07-22

## 2022-07-22 RX ORDER — FENTANYL CITRATE 50 UG/ML
INJECTION, SOLUTION INTRAMUSCULAR; INTRAVENOUS
Status: COMPLETED
Start: 2022-07-22 | End: 2022-07-22

## 2022-07-22 RX ORDER — ROCURONIUM BROMIDE 10 MG/ML
INJECTION, SOLUTION INTRAVENOUS
Status: COMPLETED
Start: 2022-07-22 | End: 2022-07-22

## 2022-07-22 RX ORDER — ROCURONIUM BROMIDE 10 MG/ML
50 INJECTION, SOLUTION INTRAVENOUS ONCE
Status: COMPLETED | OUTPATIENT
Start: 2022-07-22 | End: 2022-07-22

## 2022-07-22 RX ORDER — MEXILETINE HYDROCHLORIDE 250 MG/1
250 CAPSULE ORAL EVERY 8 HOURS
Status: DISCONTINUED | OUTPATIENT
Start: 2022-07-22 | End: 2022-07-22

## 2022-07-22 RX ORDER — MEXILETINE HYDROCHLORIDE 250 MG/1
250 CAPSULE ORAL EVERY 8 HOURS
Status: DISCONTINUED | OUTPATIENT
Start: 2022-07-22 | End: 2022-07-24

## 2022-07-22 RX ORDER — MEROPENEM AND SODIUM CHLORIDE 1 G/50ML
1 INJECTION, SOLUTION INTRAVENOUS
Status: DISCONTINUED | OUTPATIENT
Start: 2022-07-22 | End: 2022-07-22

## 2022-07-22 RX ORDER — POTASSIUM CHLORIDE 7.45 MG/ML
INJECTION INTRAVENOUS
Status: DISCONTINUED
Start: 2022-07-22 | End: 2022-07-22 | Stop reason: WASHOUT

## 2022-07-22 RX ORDER — MIDODRINE HYDROCHLORIDE 5 MG/1
10 TABLET ORAL EVERY 8 HOURS
Status: DISCONTINUED | OUTPATIENT
Start: 2022-07-22 | End: 2022-07-23

## 2022-07-22 RX ORDER — ACETAMINOPHEN 650 MG/20.3ML
650 LIQUID ORAL ONCE
Status: COMPLETED | OUTPATIENT
Start: 2022-07-22 | End: 2022-07-22

## 2022-07-22 RX ADMIN — ROCURONIUM BROMIDE 50 MG: 10 INJECTION INTRAVENOUS at 11:07

## 2022-07-22 RX ADMIN — DEXMEDETOMIDINE HYDROCHLORIDE 0.5 MCG/KG/HR: 4 INJECTION INTRAVENOUS at 02:07

## 2022-07-22 RX ADMIN — MICAFUNGIN SODIUM 100 MG: 100 INJECTION, POWDER, LYOPHILIZED, FOR SOLUTION INTRAVENOUS at 03:07

## 2022-07-22 RX ADMIN — ACETAMINOPHEN 1000 MG: 10 INJECTION INTRAVENOUS at 09:07

## 2022-07-22 RX ADMIN — HEPARIN SODIUM 1600 UNITS/HR: 5000 INJECTION INTRAVENOUS; SUBCUTANEOUS at 12:07

## 2022-07-22 RX ADMIN — EPINEPHRINE 0.05 MCG/KG/MIN: 1 INJECTION INTRAMUSCULAR; INTRAVENOUS; SUBCUTANEOUS at 09:07

## 2022-07-22 RX ADMIN — ALBUTEROL SULFATE 2.5 MG: 2.5 SOLUTION RESPIRATORY (INHALATION) at 01:07

## 2022-07-22 RX ADMIN — MIDODRINE HYDROCHLORIDE 10 MG: 5 TABLET ORAL at 09:07

## 2022-07-22 RX ADMIN — ACETYLCYSTEINE 4 ML: 100 INHALANT RESPIRATORY (INHALATION) at 01:07

## 2022-07-22 RX ADMIN — DEXMEDETOMIDINE HYDROCHLORIDE 1 MCG/KG/HR: 4 INJECTION INTRAVENOUS at 03:07

## 2022-07-22 RX ADMIN — PROPOFOL 20 MCG/KG/MIN: 10 INJECTION, EMULSION INTRAVENOUS at 08:07

## 2022-07-22 RX ADMIN — FENTANYL CITRATE 100 MCG: 50 INJECTION, SOLUTION INTRAMUSCULAR; INTRAVENOUS at 11:07

## 2022-07-22 RX ADMIN — ACETAMINOPHEN 650 MG: 160 SOLUTION ORAL at 12:07

## 2022-07-22 RX ADMIN — ANGIOTENSIN II 15.01 NG/KG/MIN: 2.5 INJECTION INTRAVENOUS at 05:07

## 2022-07-22 RX ADMIN — ALBUTEROL SULFATE 2.5 MG: 2.5 SOLUTION RESPIRATORY (INHALATION) at 07:07

## 2022-07-22 RX ADMIN — PANTOPRAZOLE SODIUM 40 MG: 40 INJECTION, POWDER, FOR SOLUTION INTRAVENOUS at 08:07

## 2022-07-22 RX ADMIN — GUAIFENESIN 300 MG: 100 SOLUTION ORAL at 05:07

## 2022-07-22 RX ADMIN — LEVOTHYROXINE SODIUM 112 MCG: 112 TABLET ORAL at 05:07

## 2022-07-22 RX ADMIN — MEXILETINE HYDROCHLORIDE 250 MG: 250 CAPSULE ORAL at 09:07

## 2022-07-22 RX ADMIN — POTASSIUM CHLORIDE 20 MEQ: 14.9 INJECTION, SOLUTION INTRAVENOUS at 09:07

## 2022-07-22 RX ADMIN — CEFEPIME HYDROCHLORIDE 2 G: 2 INJECTION, SOLUTION INTRAVENOUS at 06:07

## 2022-07-22 RX ADMIN — ANGIOTENSIN II 50 NG/KG/MIN: 2.5 INJECTION INTRAVENOUS at 04:07

## 2022-07-22 RX ADMIN — FENTANYL CITRATE 100 MCG: 50 INJECTION INTRAMUSCULAR; INTRAVENOUS at 11:07

## 2022-07-22 RX ADMIN — HYDROMORPHONE HYDROCHLORIDE 1 MG: 1 INJECTION, SOLUTION INTRAMUSCULAR; INTRAVENOUS; SUBCUTANEOUS at 08:07

## 2022-07-22 RX ADMIN — DEXMEDETOMIDINE HYDROCHLORIDE 0.8 MCG/KG/HR: 4 INJECTION INTRAVENOUS at 11:07

## 2022-07-22 RX ADMIN — MEXILETINE HYDROCHLORIDE 250 MG: 250 CAPSULE ORAL at 06:07

## 2022-07-22 RX ADMIN — POTASSIUM CHLORIDE 20 MEQ: 14.9 INJECTION, SOLUTION INTRAVENOUS at 04:07

## 2022-07-22 RX ADMIN — PROPOFOL 20 MCG/KG/MIN: 10 INJECTION, EMULSION INTRAVENOUS at 04:07

## 2022-07-22 RX ADMIN — VANCOMYCIN HYDROCHLORIDE 750 MG: 750 INJECTION, POWDER, LYOPHILIZED, FOR SOLUTION INTRAVENOUS at 08:07

## 2022-07-22 RX ADMIN — MEXILETINE HYDROCHLORIDE 250 MG: 250 CAPSULE ORAL at 08:07

## 2022-07-22 RX ADMIN — ROCURONIUM BROMIDE 50 MG: 10 INJECTION, SOLUTION INTRAVENOUS at 11:07

## 2022-07-22 RX ADMIN — MIDODRINE HYDROCHLORIDE 10 MG: 5 TABLET ORAL at 03:07

## 2022-07-22 RX ADMIN — HYDROMORPHONE HYDROCHLORIDE 1 MG: 1 INJECTION, SOLUTION INTRAMUSCULAR; INTRAVENOUS; SUBCUTANEOUS at 03:07

## 2022-07-22 RX ADMIN — AMIODARONE HYDROCHLORIDE 0.5 MG/MIN: 1.8 INJECTION, SOLUTION INTRAVENOUS at 02:07

## 2022-07-22 RX ADMIN — DEXMEDETOMIDINE HYDROCHLORIDE 1.4 MCG/KG/HR: 4 INJECTION INTRAVENOUS at 09:07

## 2022-07-22 RX ADMIN — NOREPINEPHRINE BITARTRATE 0.04 MCG/KG/MIN: 8 INJECTION, SOLUTION INTRAVENOUS at 08:07

## 2022-07-22 RX ADMIN — ASPIRIN 81 MG CHEWABLE TABLET 81 MG: 81 TABLET CHEWABLE at 08:07

## 2022-07-22 RX ADMIN — GUAIFENESIN 300 MG: 100 SOLUTION ORAL at 11:07

## 2022-07-22 RX ADMIN — PROPOFOL 20 MCG/KG/MIN: 10 INJECTION, EMULSION INTRAVENOUS at 06:07

## 2022-07-22 RX ADMIN — MEROPENEM 2 G: 1 INJECTION INTRAVENOUS at 09:07

## 2022-07-22 RX ADMIN — ACETYLCYSTEINE 4 ML: 100 INHALANT RESPIRATORY (INHALATION) at 07:07

## 2022-07-22 RX ADMIN — DEXMEDETOMIDINE HYDROCHLORIDE 1.2 MCG/KG/HR: 4 INJECTION INTRAVENOUS at 11:07

## 2022-07-22 RX ADMIN — HYDROMORPHONE HYDROCHLORIDE 1 MG: 1 INJECTION, SOLUTION INTRAMUSCULAR; INTRAVENOUS; SUBCUTANEOUS at 04:07

## 2022-07-22 RX ADMIN — CALCIUM GLUCONATE 1 G: 20 INJECTION, SOLUTION INTRAVENOUS at 09:07

## 2022-07-22 RX ADMIN — MEROPENEM 2 G: 1 INJECTION INTRAVENOUS at 08:07

## 2022-07-22 RX ADMIN — PROPOFOL 25 MCG/KG/MIN: 10 INJECTION, EMULSION INTRAVENOUS at 02:07

## 2022-07-22 NOTE — PROGRESS NOTES
[facility] - [facility]  Critical Care - Surgery  Progress Note    Patient Name: Tim Richards  MRN: [id number]  Admission Date: 6/27/2022  Hospital Length of Stay: 25 days  Code Status: Full Code  Attending Provider: Yg Kaufman MD  Primary Care Provider: Deyanira Booth MD   Principal Problem: Left ventricular assist device (LVAD) complication    Subjective:     Hospital/ICU Course:  No notes on file    Interval History/Significant Events: Increasing pressor requirements overnight. Now on gwen 35, levo 0.04, epi 0.04, vaso 0.04. Vent settings changed this morning to improve patient comfort.     Follow-up For: Procedure(s) (LRB):  CLOSURE, WOUND, STERNUM (N/A)  APPLICATION, WOUND VAC (N/A)  INSERTION, GRAFT, PERICARDIUM (N/A)  IRRIGATION, MEDIASTINUM (N/A)    Post-Operative Day: 7 Days Post-Op    Objective:     Vital Signs (Most Recent):  Temp: 100.04 °F (37.8 °C) (07/22/22 0900)  Pulse: 97 (07/22/22 0915)  Resp: (!) 30 (07/22/22 0831)  BP: (!) 80/0 (07/22/22 0806)  SpO2: (!) 94 % (07/22/22 0752)   Vital Signs (24h Range):  Temp:  [99.32 °F (37.4 °C)-101.84 °F (38.8 °C)] 100.04 °F (37.8 °C)  Pulse:  [] 97  Resp:  [16-37] 30  SpO2:  [94 %-97 %] 94 %  BP: (68-80)/(0) 80/0  Arterial Line BP: (65-98)/(57-86) 85/73     Weight: 98.9 kg (218 lb)  Body mass index is 28.76 kg/m².      Intake/Output Summary (Last 24 hours) at 7/22/2022 0929  Last data filed at 7/22/2022 0900  Gross per 24 hour   Intake 6158.88 ml   Output 4850 ml   Net 1308.88 ml       Physical Exam  Constitutional:       Comments: Intubated and sedated   HENT:      Head: Normocephalic and atraumatic.      Mouth/Throat:      Comments: OG in place  Eyes:      Pupils: Pupils are equal, round, and reactive to light.   Neck:      Comments: R IJ CVC    Cardiovascular:      Rate and Rhythm: Regular rhythm. Tachycardia present.      Comments:   LVAD in place -- 6400 rpm, 5.0L    Midline sternotomy c/d with dressing in place    1 plerual and  1 mediastinal chest tubes to suction.    V pacing wires in place.   Pulmonary:      Comments: Mechanically ventilated  Abdominal:      General: There is no distension.      Palpations: Abdomen is soft.   Genitourinary:     Comments: Lagunas in place draining clear urine  Musculoskeletal:      Comments: ECMO cannula sites with dressing in place.     Cutdown incision with seroma with wound vac in place. Minimal output    right radial arterial line   Skin:     General: Skin is warm and dry.   Neurological:      Comments: Following commands when sedation paused       Vents:  Vent Mode: Spont (07/22/22 0821)  Ventilator Initiated: Yes (07/15/22 1027)  Set Rate: 16 BPM (07/22/22 0806)  Vt Set: 550 mL (07/22/22 0806)  Pressure Support: 8 cmH20 (07/22/22 0821)  PEEP/CPAP: 5 cmH20 (07/22/22 0821)  Oxygen Concentration (%): 50 (07/22/22 0915)  Peak Airway Pressure: 14 cmH2O (07/22/22 0821)  Plateau Pressure: 21 cmH20 (07/22/22 0821)  Total Ve: 11.6 mL (07/22/22 0821)  Negative Inspiratory Force (cm H2O): 0 (07/22/22 0821)  F/VT Ratio<105 (RSBI): (!) 38.25 (07/22/22 0752)    Lines/Drains/Airways       Central Venous Catheter Line  Duration             Percutaneous Central Line Insertion/Assessment - Quad Lumen  07/21/22 1515 right internal jugular <1 day              Drain  Duration                  Urethral Catheter 07/13/22 0848 Temperature probe;Latex 14 Fr. 9 days         Chest Tube 07/13/22 1916 2 Left Pleural 32 Fr. 8 days         Chest Tube 07/13/22 1916 4 Posterior Mediastinal 32 Fr. 8 days         NG/OG Tube 07/15/22 1030 Left nostril 6 days              Airway  Duration                  Airway - Non-Surgical 07/13/22 0848 9 days              Arterial Line  Duration             Arterial Line 07/13/22 2000 Right Radial 8 days              Line  Duration                  VAD 03/08/18 1124 Left ventricular assist device HeartMate II 1596 days         VAD 07/13/22 1300 Left ventricular assist device HeartMate 3 8 days               Peripheral Intravenous Line  Duration                  Midline Catheter Insertion/Assessment  - Single Lumen 07/21/22 1616 Left basilic vein (medial side of arm) 18g x 10cm <1 day                    Significant Labs:    CBC/Anemia Profile:  Recent Labs   Lab 07/21/22  1217 07/21/22  1542 07/21/22 1918 07/21/22  1954 07/22/22  0115 07/22/22  0317 07/22/22  0801   WBC 34.57*  --  31.19*  --   --  31.70*  --    HGB 7.9*  --  7.3*  --   --  7.4*  --    HCT 25.9*   < > 23.6*   < > 23* 23.8* 24*     --  249  --   --  265  --    MCV 94  --  93  --   --  93  --    RDW 18.5*  --  18.6*  --   --  18.6*  --     < > = values in this interval not displayed.        Chemistries:  Recent Labs   Lab 07/21/22 1217 07/21/22 1918 07/22/22 0317   * 146* 144   K 3.7 3.6 3.8   * 112* 110   CO2 25 24 22*   BUN 56* 52* 53*   CREATININE 2.7* 2.4* 2.6*   CALCIUM 9.3 9.1 9.2   ALBUMIN 1.9* 1.8* 1.8*  1.8*   PROT 6.1 5.9* 6.2  6.2   BILITOT 14.6* 13.1* 12.9*  12.9*   ALKPHOS 131 114 120  120   ALT 45* 41 38  38   AST 66* 57* 57*  57*   MG 3.1* 2.9* 2.9*   PHOS 4.0 3.6 3.8       ABGs:   Recent Labs   Lab 07/22/22  0801   PH 7.399   PCO2 39.5   HCO3 24.4   POCSATURATED 94*   BE 0     Blood Culture:   Recent Labs   Lab 07/20/22  1445 07/20/22  1450   LABBLOO No Growth to date  No Growth to date No Growth to date  No Growth to date     Coagulation:   Recent Labs   Lab 07/22/22 0317   INR 1.7*   APTT 40.3*     Lactic Acid:   Recent Labs   Lab 07/21/22 1918   LACTATE 0.8     All pertinent labs within the past 24 hours have been reviewed.    Significant Imaging:  I have reviewed all pertinent imaging results/findings within the past 24 hours.    Assessment/Plan:     Chronic combined systolic and diastolic heart failure  Tim Richards is a 56 y.o. male HFrEF with an EF of 10% and a history of HM2 LVAD in 2018 who is now s/p HM3 upgrade, initiation of V-A ECMO after failure to wean off bypass x2       Neuro/Psych:   -- Sedation: Precedex.  -- Pain: PRN Dilaudid             Cards:   -- S/P LVAD exchange on 7/14/22  --Improving pressor requirements, consider weaning vaso   Epi 0.04   Levo 0.04   Vaso 0.04   Dopamine off   Giapreza 35  -- Wean giapreza today, start midodrine  -- Stress dose steroids complete  -- Ventricular pacing wires.  -- MAP goal 75  -- VAD  flows stable  -- Decannulated on 7/19  -- Sternal closure on 7/15  -- EP consult due to v-tach, will restart mexiletine today  -- Started on amio, lidocaine due to v-tach/a fib  --Echo   -- Bedside drive line removal today      Pulm:   -- Goal O2 sat > 90%  -- ABG PRN  -- Mediastinal chest tubes x1 and pleural chest tube x1 to suction, will remove 2 today  -- Nitric at 10 ppm  -- Leave on vent  -- Trach consult  -- Plan for bronch today      Renal:  -- Keep sun for strict I/O  -- Trend BUN/Cr 53/2.6 <-- 60/2.7 <-- 66/2.5 <-- 65/2.5 <-- 75/3.3 <-- 77/4.1 <--55/4.2 <-- 31/3.5 <-- 20/2.4  -- Lasix drip at 0.5        FEN / GI:   -- Replace lytes as needed  -- Nutrition: NPO  -- GI ppx: PPI  -- Bowel reg: miralax, docusate, go lytely      ID:   -- Tm: febrile; WBC 31.9 from 32.3  -- ABX miguel, vanc, micafungin  -- Cultures sent, repeat cultures sent 7/22, no growth to date  -- ID consult  -- daily vanc levels  -- Line exchange 7/21      Heme/Onc:   -- H/H stable   -- Daily CBC  -- Received 18 pRBCs, 16 FFP, 2 plt, 25 cryo; 1500 albumin intra-op  -- No product requirement since sternal closure  -- Heparin gtt at 1600, do not titrate       Endo:   -- BG goal 140-180  -- Insulin gtt  -- Levothyroxine      PPx:   Feeding: NPO  Analgesia/Sedation: Precedex / PRN Dilaudid  Thromboembolic prevention: SCDs, heparin gtt  HOB >30: Yes  Stress Ulcer ppx: PPI  Glucose control: Critical care goal 140-180 g/dl, ISS     Lines/Drains/Airway: ETT; OG; RIJ CVC, r-radial a-line, Chest tube x2; sun; WV, VAD; midline      Dispo/Code Status/Palliative:   -- SICU / Full  Code.              Dang Amezcua MD  Critical Care - Surgery  [facility] - [facility]

## 2022-07-22 NOTE — SUBJECTIVE & OBJECTIVE
Interval History: Febrile overnight, remains critically ill requiring multiple/increased pressor requirements. Events noted. Tentative plan for old driveline removal today.     Review of Systems   Unable to perform ROS: Intubated   Objective:     Vital Signs (Most Recent):  Temp: 99.32 °F (37.4 °C) (07/22/22 0630)  Pulse: 86 (07/22/22 0630)  Resp: (!) 26 (07/22/22 0439)  BP: (!) 80/0 (07/22/22 0315)  SpO2: 96 % (07/22/22 0315)   Vital Signs (24h Range):  Temp:  [98.24 °F (36.8 °C)-101.84 °F (38.8 °C)] 99.32 °F (37.4 °C)  Pulse:  [] 86  Resp:  [16-37] 26  SpO2:  [94 %-97 %] 96 %  BP: (68-80)/(0) 80/0  Arterial Line BP: (65-98)/(57-86) 81/72     Weight: 98.9 kg (218 lb)  Body mass index is 28.76 kg/m².    Estimated Creatinine Clearance: 39.7 mL/min (A) (based on SCr of 2.6 mg/dL (H)).    Physical Exam  Constitutional:       General: He is not in acute distress.     Appearance: He is ill-appearing. He is not toxic-appearing.   HENT:      Head: Normocephalic and atraumatic.      Mouth/Throat:      Comments: ETT  Eyes:      General:         Right eye: No discharge.         Left eye: No discharge.   Pulmonary:      Effort: Pulmonary effort is normal. No respiratory distress.      Comments: Chest tubes  Abdominal:      General: Bowel sounds are normal. There is no distension.      Palpations: Abdomen is soft.      Tenderness: There is no abdominal tenderness. There is no guarding.      Comments: Driveline site dressed   Genitourinary:     Comments: sun  Skin:     General: Skin is warm and dry.      Comments: R groin wound vac  Chest preveena  RIJ -no erythema or induration  Lmidline - no erythema or induration             Neurological:      Mental Status: He is disoriented.       Significant Labs:   Microbiology Results (last 7 days)       Procedure Component Value Units Date/Time    Blood culture [[id number]] Collected: 07/20/22 1450    Order Status: Completed Specimen: Blood from Wrist, Left Updated: 07/21/22 1277      Blood Culture, Routine No Growth to date      No Growth to date    Blood culture [[id number]] Collected: 07/20/22 1445    Order Status: Completed Specimen: Blood from Peripheral, Forearm, Left Updated: 07/21/22 1612     Blood Culture, Routine No Growth to date      No Growth to date    Culture, VAP (BAL) [[id number]] Collected: 07/20/22 1120    Order Status: Completed Specimen: Bronchial Alveolar Lavage from BAL, RML Updated: 07/21/22 0802     VAP BAL CULTURE Normal respiratory sachin     Gram Stain (Respiratory) Few WBC's     Gram Stain (Respiratory) Rare Gram positive cocci     Gram Stain (Respiratory) Rare Gram negative rods    Blood culture [[id number]]     Order Status: Canceled Specimen: Blood     Blood culture [[id number]]     Order Status: Canceled Specimen: Blood             Significant Imaging: I have reviewed all pertinent imaging results/findings within the past 24 hours.   unsteady/other

## 2022-07-22 NOTE — ASSESSMENT & PLAN NOTE
Leukocytosis with associated fever post-decannulation. Blcx so far ngtd, resp cx unrevealing. Pt is at risk for fungal infection (prior lines, multiple surgeries). Remains critically ill with increased pressor requirements. Plan for removal of HM3 tentatively today.     Recommendations:  -continue empiric vanco pharm to dose  -stop cefepime, start meropenem given acute decompensation overnight  -continue empiric bacilio - if no evidence of positive fungal cx x 48hr, would stop  -follow up cx data  -consider CT c/a/p to evaluate for source when feasible

## 2022-07-22 NOTE — PT/OT/SLP PROGRESS
Physical Therapy      Patient Name:  Tim Richards   MRN:  [id number]    Patient not seen today. Pt remains intubated and not medically appropriate for therapy. Pt with increased pressor over night. Tentative plan for old driveline removal today. Will follow-up when appropriate    Nursing to perform ROM and positioning until pt is appropriate for early mobility or progressive mobility.

## 2022-07-22 NOTE — PROGRESS NOTES
[facility] - Surgical Intensive Care  Infectious Disease  Progress Note    Patient Name: Tim Richards  MRN: [id number]  Admission Date: 6/27/2022  Length of Stay: 25 days  Attending Physician: Yg Kaufman MD  Primary Care Provider: Deyanira Booth MD    Isolation Status: No active isolations  Assessment/Plan:      Leukocytosis  Shock  Leukocytosis with associated high fevers post-decannulation. Blcx so far ngtd, resp cx unrevealing. Pt is at risk for fungal infection (prior lines, multiple surgeries). Remains critically ill with increased pressor requirements. Plan for removal of HM3 tentatively today.     Recommendations:  -continue empiric vanco pharm to dose  -stop cefepime, start meropenem given acute decompensation overnight  -continue empiric bacilio - if no evidence of positive fungal cx x 48hr, would stop  -follow up cx data  -consider CT c/a/p to evaluate for source when feasible          Thank you for your consult. I will follow-up with patient. Please contact us if you have any additional questions. Above d/w primary team.     Critical care time: 35 minutes   I personally spent critical care time on the following: evaluating this patient's organ dysfunction, development of treatment plan, discussing treatment plan with patient or surrogate and bedside caregivers, discussions with critical care service and/or consultants, evaluation of patient's response to treatment, physical examination of patient, ordering and review of treatments interventions, laboratory studies, and radiographic studies, re-evaluation of patient's condition. This critical care time did not overlap with that of any other provider of the same specialty or involve time for procedures.       Jessica Perez MD  Infectious Disease  [facility] - Surgical Intensive Care    Subjective:     Principal Problem:Left ventricular assist device (LVAD) complication    HPI: A 56-year-old man with HFrEF-10%, s/p VAD HM2 (March 2018), ICD, VT on  "amiodarone who developed malaise, anorexia, SOB, dark urine, and soft stools 3-4 days prior to admission. He was evaluated in [facility] ED at which time he tested positive for Covid-19 infection. He was admitted for further management and started on Remdesivir treatment protocol. Since admission, he feels significantly improved with bowel movements returning to normal and the shortness of breath subsiding.     Infectious Diseases consulted for "covid infection, acute. Patient with LVAD"    ID reconsulted 7/21 for "sepsis and consideration for fungemia". Since pt was last seen by ID, patient underwent AV repair, redo sternotomy, explant of old lvad HM2 with implantation of HM3, and placed on VA-ECMO, takeback 7/14 for mediastinal washout/hematoma, and washout, sternal closure, creation of moises-pericardium (gerotex membrane) and wound vac placement on 7/15. Decannulated ECMO on 7/19 with subsequent fever and hypotension. Pt was placed on broad spectrum abx. Blcx obtained from 7/20 ngtd. S/p bronch on 7/20 - cx with normal respiratory sachin.           Interval History: Febrile overnight, remains critically ill requiring multiple/increased pressor requirements. Events noted. Tentative plan for old driveline removal today.     Review of Systems   Unable to perform ROS: Intubated   Objective:     Vital Signs (Most Recent):  Temp: 99.32 °F (37.4 °C) (07/22/22 0630)  Pulse: 86 (07/22/22 0630)  Resp: (!) 26 (07/22/22 0439)  BP: (!) 80/0 (07/22/22 0315)  SpO2: 96 % (07/22/22 0315)   Vital Signs (24h Range):  Temp:  [98.24 °F (36.8 °C)-101.84 °F (38.8 °C)] 99.32 °F (37.4 °C)  Pulse:  [] 86  Resp:  [16-37] 26  SpO2:  [94 %-97 %] 96 %  BP: (68-80)/(0) 80/0  Arterial Line BP: (65-98)/(57-86) 81/72     Weight: 98.9 kg (218 lb)  Body mass index is 28.76 kg/m².    Estimated Creatinine Clearance: 39.7 mL/min (A) (based on SCr of 2.6 mg/dL (H)).    Physical Exam  Constitutional:       General: He is not in acute distress.     " Appearance: He is ill-appearing. He is not toxic-appearing.   HENT:      Head: Normocephalic and atraumatic.      Mouth/Throat:      Comments: ETT  Eyes:      General:         Right eye: No discharge.         Left eye: No discharge.   Pulmonary:      Effort: Pulmonary effort is normal. No respiratory distress.      Comments: Chest tubes  Abdominal:      General: Bowel sounds are normal. There is no distension.      Palpations: Abdomen is soft.      Tenderness: There is no abdominal tenderness. There is no guarding.      Comments: Driveline site dressed   Genitourinary:     Comments: sun  Skin:     General: Skin is warm and dry.      Comments: R groin wound vac  Chest preveena  RIJ -no erythema or induration  Lmidline - no erythema or induration             Neurological:      Mental Status: He is disoriented.       Significant Labs:   Microbiology Results (last 7 days)       Procedure Component Value Units Date/Time    Blood culture [[id number]] Collected: 07/20/22 1450    Order Status: Completed Specimen: Blood from Wrist, Left Updated: 07/21/22 1612     Blood Culture, Routine No Growth to date      No Growth to date    Blood culture [[id number]] Collected: 07/20/22 1445    Order Status: Completed Specimen: Blood from Peripheral, Forearm, Left Updated: 07/21/22 1612     Blood Culture, Routine No Growth to date      No Growth to date    Culture, VAP (BAL) [[id number]] Collected: 07/20/22 1120    Order Status: Completed Specimen: Bronchial Alveolar Lavage from BAL, L Updated: 07/21/22 0802     VAP BAL CULTURE Normal respiratory sachin     Gram Stain (Respiratory) Few WBC's     Gram Stain (Respiratory) Rare Gram positive cocci     Gram Stain (Respiratory) Rare Gram negative rods    Blood culture [[id number]]     Order Status: Canceled Specimen: Blood     Blood culture [[id number]]     Order Status: Canceled Specimen: Blood             Significant Imaging: I have reviewed all pertinent imaging results/findings within the  past 24 hours.

## 2022-07-22 NOTE — PROGRESS NOTES
07/22/22 1100   WOCN Assessment   WOCN Total Time (mins) 5   Visit Date 07/22/22   Visit Time 1050   Consult Type New   Intervention assessed;chart review;orders;coordination of care   Teaching on-going        Altered Skin Integrity 07/22/22 1050 Right Ear Other (comment)   Date First Assessed/Time First Assessed: 07/22/22 1050   Side: Right  Location: Ear  Is this injury device related?: Yes  Primary Wound Type: Other (comment)   Wound Image    Dressing Appearance Open to air   Drainage Amount None   Appearance Black;Dry   Black (%), Wound Tissue Color 100 %   Periwound Area Redness   Wound Length (cm) 3.2 cm   Wound Width (cm) 1 cm   Wound Depth (cm) 0 cm   Wound Volume (cm^3) 0 cm^3   Wound Surface Area (cm^2) 3.2 cm^2   Care Cleansed with:;Povidone iodine        Urethral Catheter 07/13/22 0848 Temperature probe;Latex 14 Fr.   Placement Date/Time: 07/13/22 0848   Present Prior to Hospital Arrival?: No  Hand Hygiene: Performed  Inserted by: RN   Name: Annmarie Hernández  Insertion attempts (enter comment if more than 2 attempts): 1  Catheter Type: Temperature probe;Latex...   Output (mL) 125 mL   Wound consult performed.  Recommendation:  Rt ear--betadine and leave neal.  NPWT placed per CTS yesterday, intact.  Being prepped for procedure by staff.  Will follow to assess need for vac changes.

## 2022-07-22 NOTE — PROGRESS NOTES
Pt wakes up to gentle touch.  Following commands, moves all extremities equally .  See flow sheet for complete assessment.

## 2022-07-22 NOTE — ASSESSMENT & PLAN NOTE
Lab Results   Component Value Date    WBC 28.94 (H) 07/22/2022 7/20 +VAP Cx Gram negative jodie  -on meropenem and cefepime  -Plan per primary team

## 2022-07-22 NOTE — ASSESSMENT & PLAN NOTE
Sodium   Date Value Ref Range Status   07/22/2022 143 136 - 145 mmol/L Final     Corrected w/ IVF bolus and D5W      Recommendations:  -when tube feeds are resumed start with scheduled free water 400mls Q6H   -lasix IVP prn if pt has increased oxygen requirements

## 2022-07-22 NOTE — SUBJECTIVE & OBJECTIVE
Interval History/Significant Events: Increasing pressor requirements overnight. Now on gwen 35, levo 0.04, epi 0.04, vaso 0.04. Vent settings changed this morning to improve patient comfort.     Follow-up For: Procedure(s) (LRB):  CLOSURE, WOUND, STERNUM (N/A)  APPLICATION, WOUND VAC (N/A)  INSERTION, GRAFT, PERICARDIUM (N/A)  IRRIGATION, MEDIASTINUM (N/A)    Post-Operative Day: 7 Days Post-Op    Objective:     Vital Signs (Most Recent):  Temp: 100.04 °F (37.8 °C) (07/22/22 0900)  Pulse: 97 (07/22/22 0915)  Resp: (!) 30 (07/22/22 0831)  BP: (!) 80/0 (07/22/22 0806)  SpO2: (!) 94 % (07/22/22 0752)   Vital Signs (24h Range):  Temp:  [99.32 °F (37.4 °C)-101.84 °F (38.8 °C)] 100.04 °F (37.8 °C)  Pulse:  [] 97  Resp:  [16-37] 30  SpO2:  [94 %-97 %] 94 %  BP: (68-80)/(0) 80/0  Arterial Line BP: (65-98)/(57-86) 85/73     Weight: 98.9 kg (218 lb)  Body mass index is 28.76 kg/m².      Intake/Output Summary (Last 24 hours) at 7/22/2022 0929  Last data filed at 7/22/2022 0900  Gross per 24 hour   Intake 6158.88 ml   Output 4850 ml   Net 1308.88 ml       Physical Exam  Constitutional:       Comments: Intubated and sedated   HENT:      Head: Normocephalic and atraumatic.      Mouth/Throat:      Comments: OG in place  Eyes:      Pupils: Pupils are equal, round, and reactive to light.   Neck:      Comments: R IJ CVC    Cardiovascular:      Rate and Rhythm: Regular rhythm. Tachycardia present.      Comments:   LVAD in place -- 6400 rpm, 5.0L    Midline sternotomy c/d with dressing in place    1 plerual and 1 mediastinal chest tubes to suction.    V pacing wires in place.   Pulmonary:      Comments: Mechanically ventilated  Abdominal:      General: There is no distension.      Palpations: Abdomen is soft.   Genitourinary:     Comments: Lagunas in place draining clear urine  Musculoskeletal:      Comments: ECMO cannula sites with dressing in place.     Cutdown incision with seroma with wound vac in place. Minimal output    right  radial arterial line   Skin:     General: Skin is warm and dry.   Neurological:      Comments: Following commands when sedation paused       Vents:  Vent Mode: Spont (07/22/22 0821)  Ventilator Initiated: Yes (07/15/22 1027)  Set Rate: 16 BPM (07/22/22 0806)  Vt Set: 550 mL (07/22/22 0806)  Pressure Support: 8 cmH20 (07/22/22 0821)  PEEP/CPAP: 5 cmH20 (07/22/22 0821)  Oxygen Concentration (%): 50 (07/22/22 0915)  Peak Airway Pressure: 14 cmH2O (07/22/22 0821)  Plateau Pressure: 21 cmH20 (07/22/22 0821)  Total Ve: 11.6 mL (07/22/22 0821)  Negative Inspiratory Force (cm H2O): 0 (07/22/22 0821)  F/VT Ratio<105 (RSBI): (!) 38.25 (07/22/22 0752)    Lines/Drains/Airways       Central Venous Catheter Line  Duration             Percutaneous Central Line Insertion/Assessment - Quad Lumen  07/21/22 1515 right internal jugular <1 day              Drain  Duration                  Urethral Catheter 07/13/22 0848 Temperature probe;Latex 14 Fr. 9 days         Chest Tube 07/13/22 1916 2 Left Pleural 32 Fr. 8 days         Chest Tube 07/13/22 1916 4 Posterior Mediastinal 32 Fr. 8 days         NG/OG Tube 07/15/22 1030 Left nostril 6 days              Airway  Duration                  Airway - Non-Surgical 07/13/22 0848 9 days              Arterial Line  Duration             Arterial Line 07/13/22 2000 Right Radial 8 days              Line  Duration                  VAD 03/08/18 1124 Left ventricular assist device HeartMate II 1596 days         VAD 07/13/22 1300 Left ventricular assist device HeartMate 3 8 days              Peripheral Intravenous Line  Duration                  Midline Catheter Insertion/Assessment  - Single Lumen 07/21/22 1616 Left basilic vein (medial side of arm) 18g x 10cm <1 day                    Significant Labs:    CBC/Anemia Profile:  Recent Labs   Lab 07/21/22  1217 07/21/22  1542 07/21/22  1918 07/21/22  1954 07/22/22  0115 07/22/22  0317 07/22/22  0801   WBC 34.57*  --  31.19*  --   --  31.70*  --    HGB  7.9*  --  7.3*  --   --  7.4*  --    HCT 25.9*   < > 23.6*   < > 23* 23.8* 24*     --  249  --   --  265  --    MCV 94  --  93  --   --  93  --    RDW 18.5*  --  18.6*  --   --  18.6*  --     < > = values in this interval not displayed.        Chemistries:  Recent Labs   Lab 07/21/22  1217 07/21/22 1918 07/22/22 0317   * 146* 144   K 3.7 3.6 3.8   * 112* 110   CO2 25 24 22*   BUN 56* 52* 53*   CREATININE 2.7* 2.4* 2.6*   CALCIUM 9.3 9.1 9.2   ALBUMIN 1.9* 1.8* 1.8*  1.8*   PROT 6.1 5.9* 6.2  6.2   BILITOT 14.6* 13.1* 12.9*  12.9*   ALKPHOS 131 114 120  120   ALT 45* 41 38  38   AST 66* 57* 57*  57*   MG 3.1* 2.9* 2.9*   PHOS 4.0 3.6 3.8       ABGs:   Recent Labs   Lab 07/22/22  0801   PH 7.399   PCO2 39.5   HCO3 24.4   POCSATURATED 94*   BE 0     Blood Culture:   Recent Labs   Lab 07/20/22  1445 07/20/22  1450   LABBLOO No Growth to date  No Growth to date No Growth to date  No Growth to date     Coagulation:   Recent Labs   Lab 07/22/22 0317   INR 1.7*   APTT 40.3*     Lactic Acid:   Recent Labs   Lab 07/21/22 1918   LACTATE 0.8     All pertinent labs within the past 24 hours have been reviewed.    Significant Imaging:  I have reviewed all pertinent imaging results/findings within the past 24 hours.

## 2022-07-22 NOTE — OP NOTE
Date of Service: 7/22/2022    Pre Op Diagnosis:  1. S/p LVAD exchange  2. Pump Thrombosis  3. S/p VA ECMO  4. Presence of old driveline    Post Op Diagnosis: Same    Operation:  1. Removal of Old driveline    Staff Surgeon: Yg Kaufman  1st Assistant: Celia Helms  Anesthesia: GTA  EBL: 20 cc  Specimen: Driveline of HM2    Key Findings:  1. The remanant driveline with velour was removed    Indication for Surgery:  This is a 56 yr old male who underwent LVAD exchange due to pump thrombosis. The old driveline was left in place to be removed later as patient postoperative had significant bleeding and coagulopathy. The decision was made to leave the drive line and not create another potential area for bleeding. Pt has satibized and now a decision was made to remove the old driveline. An informed consent was obtained.    Procedure.  The operation was performed in the SICU with cardiac anaesthesia personnel. After induction of anesthesia the area was prepped and draped in the usual sterile fashion. A liner incision was made on the abdominal wall right over the drive line and carried subcutaneously. The driveline was identified and circumferential dissection was carried out. The entire remaining driveline was dissected along with velaour and removed. Good hemostasis was obtained. The wound was irrigated and using electrocautery the driveline exit site was excised and packed with iodoform gauze. The incision was then approximated in layers using monofilament suture. Patient tolerated the procedure well. Terminal count of needles, instruments and sponges were correct.    Medicare attestation:  Due to unavailability of any adequately trained cardiothoracic surgery resident I performed all parts of the operation myself.

## 2022-07-22 NOTE — PROGRESS NOTES
Pt had run of vtach, shocked once by internal aicd.  EP at bedside interrogating AICD, Dr. Kaufman at bedside.  Admin 20 mEq of potassium iv.

## 2022-07-22 NOTE — ASSESSMENT & PLAN NOTE
Tim Richards is a 56 y.o. male HFrEF with an EF of 10% and a history of HM2 LVAD in 2018 who is now s/p HM3 upgrade, initiation of V-A ECMO after failure to wean off bypass x2      Neuro/Psych:   -- Sedation: Precedex.  -- Pain: PRN Dilaudid             Cards:   -- S/P LVAD exchange on 7/14/22  --Improving pressor requirements, consider weaning vaso   Epi 0.04   Levo 0.04   Vaso 0.04   Dopamine off   Giapreza 35  -- Wean giapreza today, start midodrine  -- Stress dose steroids complete  -- Ventricular pacing wires.  -- MAP goal 75  -- VAD  flows stable  -- Decannulated on 7/19  -- Sternal closure on 7/15  -- EP consult due to v-tach, will restart mexiletine today  -- Started on amio, lidocaine due to v-tach/a fib  --Echo   -- Bedside drive line removal today      Pulm:   -- Goal O2 sat > 90%  -- ABG PRN  -- Mediastinal chest tubes x1 and pleural chest tube x1 to suction, will remove 2 today  -- Nitric at 10 ppm  -- Leave on vent  -- Trach consult  -- Plan for bronch today      Renal:  -- Keep sun for strict I/O  -- Trend BUN/Cr 53/2.6 <-- 60/2.7 <-- 66/2.5 <-- 65/2.5 <-- 75/3.3 <-- 77/4.1 <--55/4.2 <-- 31/3.5 <-- 20/2.4  -- Lasix drip at 0.5        FEN / GI:   -- Replace lytes as needed  -- Nutrition: NPO  -- GI ppx: PPI  -- Bowel reg: miralax, docusate, go lytely      ID:   -- Tm: febrile; WBC 31.9 from 32.3  -- ABX miguel, vanc, micafungin  -- Cultures sent, repeat cultures sent 7/22, no growth to date  -- ID consult  -- daily vanc levels  -- Line exchange 7/21      Heme/Onc:   -- H/H stable   -- Daily CBC  -- Received 18 pRBCs, 16 FFP, 2 plt, 25 cryo; 1500 albumin intra-op  -- No product requirement since sternal closure  -- Heparin gtt at 1600, do not titrate       Endo:   -- BG goal 140-180  -- Insulin gtt  -- Levothyroxine      PPx:   Feeding: NPO  Analgesia/Sedation: Precedex / PRN Dilaudid  Thromboembolic prevention: SCDs, heparin gtt  HOB >30: Yes  Stress Ulcer ppx: PPI  Glucose control:  Critical care goal 140-180 g/dl, ISS     Lines/Drains/Airway: ETT; OG; RIJ CVC, r-radial a-line, Chest tube x2; sun; WV, VAD; midline      Dispo/Code Status/Palliative:   -- SICU / Full Code.

## 2022-07-22 NOTE — PT/OT/SLP PROGRESS
Occupational Therapy      Patient Name:  Tim Richards   MRN:  [id number]    Patient not seen today secondary to patient remains intubated/sedated, pressor support required, pt CAROL ANN for lead removal this AM. OT to monitor for appropriate eval time.     7/22/2022

## 2022-07-22 NOTE — PROGRESS NOTES
"[facility]  Nephrology  Progress Note    Patient Name: Tim Richards  MRN: [id number]  Admission Date: 6/27/2022  Hospital Length of Stay: 25 days  Attending Provider: Yg Kaufman MD   Primary Care Physician: Deyanira Booth MD  Principal Problem:Left ventricular assist device (LVAD) complication    Subjective:     HPI:   Tim Richards is a 56 y.o. male w/ Known CKD 3b (without prior nephrology f/u), NICM, end-stage HF, EF 10% s/p HM2 LVAD (2018) and ICD placement (2014), ventricular fibrillation (2020), GI bleed (2019), hypothyroidism, alcohol use (2014), nicotine dependence, and CHICHI amitted on 6/27/2022 for evaluation and management of decreased appetite, decreased and dark-colored urine, and increasing shortness of breath.     History obtained from EMR and family at bedside.    In the ED, pt presented with the following VS:   Initial Vitals   BP Pulse Resp Temp SpO2   06/27/22 1129 06/27/22 1038 06/27/22 1038 06/27/22 1038 06/27/22 1810   (!) 94/0 93 20 98 °F (36.7 °C) 95 %      MAP       --                Pt was found to be in acute decompensated heart failure, hypoxic, and severely volume overloaded, for which he was started on BIPAP and on lasix gtt. On 6/30 pt had ventricular tachycardia with ICD shock, this was believed to be 2/2 hypokalemia.   His LVAD was interrogated and there was a concern of LVAD thrombosis and pt was started on Integrilin,however whilke on medical management pt continue with worsening hemolysis and so CTS was consulted and recommended upgrading to HM3 and on 7/13 pt underwent HM 2 explantation, and implantation of HM 3 LVAD. Intraoperatively pt had significant vasoplegic shock and pt was placed on ECMO as well as on vasopressors and steroids for vasodilatory shock. Pt remained intubated postoperatively.   Of note, on admission pt was found to be COVID 19 + and was treated with Remdesivir.     Nephrology was consulted for: "may soon have indication " "for dialysis"  Baseline sCr: 1.9-2.3  On admission his Cr was: 2.5    On 7/13 (day of surgery) his I/Os were as follows: 20L/4.4L, net +15.6L, urine 1965mls and 2.5L from chest tube    On 7/14 to 7/15 his I/Os were: 6.4/5.5L,net 866mls, 1.6L chest tube and 4L urine         Interval History: decannulated from ECMO 07/20/2022  Intubated with relatively unstable overnight, was febrile with hypotension requiring increase in pressors, he was started on angiotensin, he is on epi gtt, lidocaine gtt, levophed gtt, and remains on heparin gtt  Off lasix and metolazone overnight. Warfarin started on 7/20 and mexiletine started this morning   Antibiotics: meropenem and cefepime   7/20 +VAP Cx Gram negative jodie    Renal Tube feeds on hold overnight and this morning    sNa  150->149->IVF bolus and D5W->148->143   Mg->2.9->3.1->?2->3.2->2.9    T.bili 12->10.3->?0.9->15.9->13.5    sCr 4.1-->2.7 ->2.4->2.6->2.8 , BUN 60->50 (has been off tube feeds)    I/O: 5.9/4.6L urine and 450ml from drains and 110mls from chest tube, net +720mls in the last 24 hours    Family at bedside    Review of patient's allergies indicates:   Allergen Reactions    Lisinopril Anaphylaxis    Hydralazine analogues      Chronic constipation, impotence, dizziness     Current Facility-Administered Medications   Medication Frequency    0.9%  NaCl infusion PRN    0.9%  NaCl infusion PRN    acetylcysteine 100 mg/ml (10%) solution 4 mL TID WAKE    albuterol sulfate nebulizer solution 2.5 mg TID WAKE    amiodarone 360 mg/200 mL (1.8 mg/mL) infusion Continuous    angiotensin II (GIAPREZA) 2,500,000 ng in sodium chloride 0.9% 250 mL infusion Continuous    aspirin chewable tablet 81 mg Daily    dexmedetomidine (PRECEDEX) 400mcg/100mL 0.9% NaCL infusion Continuous    dextrose 10% bolus 125 mL PRN    dextrose 10% bolus 250 mL PRN    EPINEPHrine (ADRENALIN) 10 mg in dextrose 5 % 250 mL infusion Continuous    furosemide (LASIX) 200 mg in dextrose 5 % 100 mL " continuous infusion (conc: 2 mg/mL) Continuous    guaiFENesin 100 mg/5 ml syrup 300 mg Q6H    heparin 25,000 units in dextrose 5% 250 mL (100 units/mL) infusion (heparin infusion - NO NOMOGRAM) Continuous    HYDROmorphone injection 0.5 mg Q4H PRN    HYDROmorphone injection 1 mg Q4H PRN    levothyroxine tablet 112 mcg Before breakfast    LIDOcaine 2000 mg in D5W 250 mL infusion Continuous    meropenem (MERREM) 2 g in sodium chloride 0.9% 100 mL IVPB Q12H    mexiletine capsule 250 mg Q8H    micafungin 100 mg in sodium chloride 0.9 % 100 mL IVPB (ready to mix system) Q24H    midodrine tablet 10 mg Q8H    nitric oxide gas Gas 10 ppm Continuous    NORepinephrine 8 mg in dextrose 5% 250 mL infusion Continuous    pantoprazole injection 40 mg BID    propofol (DIPRIVAN) 10 mg/mL infusion Continuous    vancomycin - pharmacy to dose pharmacy to manage frequency    vasopressin (PITRESSIN) 1 Units/mL in dextrose 5 % 100 mL infusion Continuous       Objective:     Vital Signs (Most Recent):  Temp: (!) 101.48 °F (38.6 °C) (07/22/22 1328)  Pulse: 82 (07/22/22 1328)  Resp: (!) 21 (07/22/22 1315)  BP: (!) 80/0 (07/22/22 0806)  SpO2: (!) 94 % (07/22/22 1144)  O2 Device (Oxygen Therapy): ventilator (07/22/22 1315)   Vital Signs (24h Range):  Temp:  [99.32 °F (37.4 °C)-101.84 °F (38.8 °C)] 101.48 °F (38.6 °C)  Pulse:  [] 82  Resp:  [16-32] 21  SpO2:  [94 %-97 %] 94 %  BP: (80)/(0) 80/0  Arterial Line BP: (65-98)/(57-86) 80/70     Weight: 98.9 kg (218 lb) (07/22/22 0500)  Body mass index is 28.76 kg/m².  Body surface area is 2.26 meters squared.    I/O last 3 completed shifts:  In: 7902.9 [I.V.:4702.8; NG/GT:1085; IV Piggyback:2115.1]  Out: 8825 [Urine:7625; Drains:850; Chest Tube:350]    Physical Exam  Vitals and nursing note reviewed.   Constitutional:       General: He is in acute distress.      Appearance: He is ill-appearing. He is not toxic-appearing or diaphoretic.   HENT:      Mouth/Throat:      Mouth:  Mucous membranes are moist.   Eyes:      General: No scleral icterus.  Cardiovascular:      Rate and Rhythm: Normal rate and regular rhythm.      Pulses: Normal pulses.      Heart sounds: Murmur heard.      Comments: Intubated   + R IJ Trialysis catheter   Pulmonary:      Effort: Pulmonary effort is normal. No respiratory distress.      Breath sounds: Normal breath sounds. No stridor. No wheezing, rhonchi or rales.      Comments: Intubated  Chest tube   Abdominal:      General: There is no distension.      Palpations: There is no mass.   Genitourinary:     Comments: Dark yellow colored urine in sun collection bag   Musculoskeletal:         General: Swelling present. No tenderness, deformity or signs of injury.      Right lower leg: Edema present.      Left lower leg: Edema present.   Skin:     General: Skin is warm.      Capillary Refill: Capillary refill takes 2 to 3 seconds.      Coloration: Skin is pale. Skin is not jaundiced.      Findings: No bruising, erythema, lesion or rash.   Neurological:      Comments: Alert, nods to yes and no questions       Significant Labs:  ABGs:   Recent Labs   Lab 07/22/22  0801   PH 7.399   PCO2 39.5   HCO3 24.4   POCSATURATED 94*   BE 0       CBC:   Recent Labs   Lab 07/22/22  1224   WBC 28.94*   RBC 2.41*   HGB 6.8*   HCT 21.6*      MCV 90   MCH 28.2   MCHC 31.5*       CMP:   Recent Labs   Lab 07/22/22  0858   *   CALCIUM 9.3   ALBUMIN 1.8*   PROT 6.4      K 4.1   CO2 24      BUN 50*   CREATININE 2.8*   ALKPHOS 118   ALT 41   AST 59*   BILITOT 13.5*       LFTs:   Recent Labs   Lab 07/22/22  0858   ALT 41   AST 59*   ALKPHOS 118   BILITOT 13.5*   PROT 6.4   ALBUMIN 1.8*       All labs within the past 24 hours have been reviewed.     Significant Imaging:  Labs: Reviewed  X-Ray: Reviewed    Assessment/Plan:     WAGNER (acute kidney injury)  -Ischemic ATN 2/2 decrease perfusion severe hypotension in a patient with known CKD 3b  Baseline sCr: 1.9-2.3  On  admission his Cr was: 2.5  Lab Results   Component Value Date    CREATININE 2.8 (H) 07/22/2022     7/15 urine microscopy showed: many granular casts, muddy brown casts, waxy casts, some WBCS, some yeast, and occasional RBCs    Recommendations:   -PRN lasix IV Push if pt continues to be net+   -corrected Ca 11 maybe d/t volume contraction   -Electrolytes: K 3.7, renal diet, page nephrology if K >5.5    Phos level 4.1, no phos binders needed at this time    Mg 2.9, goal <2.5  -Acid/base: AGMA 2/2 ATN  -Fluid balance: pt is 3rd spacing as his albumin is 2   -Anemia: Hgb 6.8, send anemia panel labs, transfuse for Hgb <7.0  -Strict I/O's and daily weights  -Renal function panel and Mg levels Q8H   -Renal ultrasound reviewed: no hydronephrosis and minimal CKD changes  -Maintain MAP >65 for renal perfusion    There is no immediate indication for KRT at this time      Hypernatremia  Sodium   Date Value Ref Range Status   07/22/2022 143 136 - 145 mmol/L Final     Corrected w/ IVF bolus and D5W      Recommendations:  -when tube feeds are resumed start with scheduled free water 400mls Q6H   -lasix IVP prn if pt has increased oxygen requirements       Heart replaced by heart assist device  7/13 pt underwent HM 2 explantation, and implantation of HM 3 LVAD  -Plan per primary team       Leukocytosis  Lab Results   Component Value Date    WBC 28.94 (H) 07/22/2022 7/20 +VAP Cx Gram negative jodie  -on meropenem and cefepime  -Plan per primary team       Chronic combined systolic and diastolic heart failure  -pt is net + if oxygen requirements are increasing would start with IV lasix pushes  -Plan per primary team           Thank you for your consult. I will follow-up with patient. Please contact us if you have any additional questions.    Holly Spangler MD  Nephrology  [facility]

## 2022-07-22 NOTE — NURSING
MD Shae notified of VS, gtts, CVP, ABG, labs, VAD numbers, Marilia, vent settings, I & O.  Orders received and implemented to increase epinephrine gtt to 0.06 mcg/kg/min., decrease giapreza to 35 ng/kg/min.

## 2022-07-22 NOTE — PROGRESS NOTES
Pharmacokinetic Assessment Initial: IV Vancomycin    Vancomycin Regimen Assessment/Plan:    - Vancomycin consult discontinued yesterday.  Pharmacy re-consulted for vancomycin dosing this morning.   - Vancomycin random level from AM labs resulted as 6.5 mg/dl, drawn ~ 24 hours after the previous level of 11.3 mg/dl.  Goal 10-20 mg/dl.    - Based on 2-point kinetics: Ke = 0.023, T 1/2 = 30.1 hr  - Patient with WAGNER on CKD3, continue pulse dosing.   - Administer vancomycin 750 mg x 1 dose.   - A random level is ordered with AM labs tomorrow to assess clearance.  Pharmacy to re-dose based on level and renal function.     Drug levels (last 3 results):  Recent Labs   Lab Result Units 07/21/22  0456 07/22/22  0455   Vancomycin, Random ug/mL 11.3 6.5       Pharmacy will continue to follow and monitor vancomycin.    Please contact pharmacy at extension [phone] for questions regarding this assessment.    Thank you for the consult,   Cecy Lopez, PharmD, BCCCP       Patient brief summary:  Tim Richards is a 56 y.o. male initiated on antimicrobial therapy with IV Vancomycin for treatment of sepsis    The patient's current regimen is pulse dosing.     Drug Allergies:   Review of patient's allergies indicates:   Allergen Reactions    Lisinopril Anaphylaxis    Hydralazine analogues      Chronic constipation, impotence, dizziness       Actual Body Weight:   98.9 kg    Renal Function:   Estimated Creatinine Clearance: 36.9 mL/min (A) (based on SCr of 2.8 mg/dL (H)).,     Dialysis Method (if applicable):  N/A    CBC (last 72 hours):  Recent Labs   Lab Result Units 07/19/22  1941 07/20/22  0038 07/20/22  0421 07/20/22  1130 07/20/22  1951 07/21/22  0352 07/21/22  1217 07/21/22  1918 07/22/22  0317   WBC K/uL 15.75* 16.52* 17.18* 20.15* 26.96* 32.22* 34.57* 31.19* 31.70*   Hemoglobin g/dL 7.7* 7.8* 7.5* 7.6* 7.7* 7.8* 7.9* 7.3* 7.4*   Hematocrit % 24.6* 25.5* 24.6* 24.7* 25.5* 26.0* 25.9* 23.6* 23.8*   Platelets K/uL 132*  148* 153 186 200 207 226 249 265   Gran % % 78.1* 80.4* 80.1* 81.9* 85.6* 86.9* 88.8* 87.7* 86.1*   Lymph % % 4.9* 3.8* 5.1* 4.8* 3.7* 3.6* 2.3* 3.1* 4.1*   Mono % % 11.7 10.7 10.0 10.7 8.0 6.7 5.9 6.5 6.5   Eosinophil % % 0.6 0.5 0.3 0.1 0.1 0.1 0.1 0.1 0.1   Basophil % % 0.3 0.1 0.2 0.1 0.1 0.2 0.2 0.1 0.1   Differential Method  Automated Automated Automated Automated Automated Automated Automated Automated Automated       Metabolic Panel (last 72 hours):  Recent Labs   Lab Result Units 07/19/22  1941 07/20/22  0038 07/20/22  0421 07/20/22  1130 07/20/22  1951 07/21/22  0352 07/21/22  1217 07/21/22  1918 07/22/22  0317 07/22/22  0858   Sodium mmol/L 150* 150* 149* 149* 139 149* 148* 146* 144 143   Potassium mmol/L 4.7 5.0 4.5 4.0 3.6 3.7 3.7 3.6 3.8 4.1   Chloride mmol/L 119* 119* 117* 115* 109 113* 113* 112* 110 109   CO2 mmol/L 22* 20* 21* 22* 23 24 25 24 22* 24   Glucose mg/dL 130* 134* 167* 123* 102 163* 146* 123* 123* 141*   BUN mg/dL 66* 68* 66* 64* 7 60* 56* 52* 53* 50*   Creatinine mg/dL 2.3* 2.5* 2.5* 2.4* 0.4* 2.7* 2.7* 2.4* 2.6* 2.8*   Albumin g/dL 1.9* 2.0* 2.0*  2.0* 1.9* 2.6* 2.0* 1.9* 1.8* 1.8*  1.8* 1.8*   Total Bilirubin mg/dL 12.4* 12.9* 13.9*  13.9* 14.5* 0.9 15.9* 14.6* 13.1* 12.9*  12.9* 13.5*   Alkaline Phosphatase U/L 121 121 124  124 123 110 131 131 114 120  120 118   AST U/L 71* 78* 77*  77* 77* 18 72* 66* 57* 57*  57* 59*   ALT U/L 48* 49* 50*  50* 47* 15 50* 45* 41 38  38 41   Magnesium mg/dL 2.7* 2.9* 3.1* 3.0* 2.0 3.2* 3.1* 2.9* 2.9* 2.9*   Phosphorus mg/dL 4.0 3.3 4.5 4.3 4.4 4.4 4.0 3.6 3.8 4.1       Vancomycin Administrations:  vancomycin given in the last 96 hours                   vancomycin 1.25 g in dextrose 5% 250 mL IVPB (ready to mix) (mg) 1,250 mg New Bag 07/20/22 1454                Microbiologic Results:  Microbiology Results (last 7 days)     Procedure Component Value Units Date/Time    Culture, VAP (BAL) [[id number]]  (Abnormal) Collected: 07/20/22 1120    Order  Status: Completed Specimen: Bronchial Alveolar Lavage from BAL, RML Updated: 07/22/22 1112     VAP BAL CULTURE GRAM NEGATIVE LAURA  Few  Identification and susceptibility pending  Normal respiratory sachin also present       Gram Stain (Respiratory) Few WBC's     Gram Stain (Respiratory) Rare Gram positive cocci     Gram Stain (Respiratory) Rare Gram negative rods    Blood culture [[id number]] Collected: 07/22/22 0918    Order Status: Sent Specimen: Blood from Peripheral, Hand, Left Updated: 07/22/22 0923    Blood culture [[id number]] Collected: 07/22/22 0857    Order Status: Sent Specimen: Blood from Peripheral, Antecubital, Right Updated: 07/22/22 0907    Blood culture [[id number]] Collected: 07/20/22 1450    Order Status: Completed Specimen: Blood from Wrist, Left Updated: 07/21/22 1612     Blood Culture, Routine No Growth to date      No Growth to date    Blood culture [[id number]] Collected: 07/20/22 1445    Order Status: Completed Specimen: Blood from Peripheral, Forearm, Left Updated: 07/21/22 1612     Blood Culture, Routine No Growth to date      No Growth to date    Blood culture [[id number]]     Order Status: Canceled Specimen: Blood     Blood culture [[id number]]     Order Status: Canceled Specimen: Blood

## 2022-07-22 NOTE — ASSESSMENT & PLAN NOTE
-pt is net + if oxygen requirements are increasing would start with IV lasix pushes  -Plan per primary team

## 2022-07-22 NOTE — PROGRESS NOTES
Notified Dr. Kaufman off all gtts, rate, uop, map 64, all vs.  Increased levophed gtt to 0.06 mcg/kg/min.  See chart for all orders received.

## 2022-07-22 NOTE — ASSESSMENT & PLAN NOTE
The patient presented with COVID and found to have an uptrending LDH with increased power.  He underwent HM III upgrade on 7/13/22  -Daily LDH   -Continue Heparin drip    Procedure: Device Interrogation Including analysis of device parameters  Current Settings: Ventricular Assist Device    TXP LVAD INTERROGATIONS 7/22/2022 7/22/2022 7/22/2022 7/22/2022 7/22/2022 7/22/2022 7/22/2022   Type HeartMate3 HeartMate3 HeartMate3 HeartMate3 HeartMate3 HeartMate3 HeartMate3   Flow 5.2 5.3 5.3 5.5 5.4 5.5 5.5   Speed 6400 6400 6400 6400 6400 6400 6400   PI 3.7 3.6 3.6 3.2 3.3 3.3 3.3   Power (Jackson) 5.4 5.5 5.5 5.5 5.5 5.5 5.4   LSL - - 6000 6000 6000 6000 6000   Low Flow Alarm - - - - - - -   Pulsatility - - Intermittent pulse Intermittent pulse Intermittent pulse Intermittent pulse Intermittent pulse

## 2022-07-22 NOTE — ASSESSMENT & PLAN NOTE
#ADHF  #NICMP  Underwent HM III upgrade on 7/13/22, currently on VA ECMO  Currently on Epi gtt, Vasopressin, Norepi, and Angiotension III  Currently on Precedex gtt  Being treated for sepsis  Currently intubated and sedated   Plan for chest tube removal, bronch, and old driveline removal today

## 2022-07-22 NOTE — PROGRESS NOTES
07/22/2022  Casper Harris    Current provider:  Yg Kaufman MD    Device interrogation:  TXP LVAD INTERROGATIONS 7/22/2022 7/22/2022 7/22/2022 7/22/2022 7/22/2022 7/22/2022 7/22/2022   Type HeartMate3 HeartMate3 HeartMate3 HeartMate3 HeartMate3 HeartMate3 HeartMate3   Flow 5.3 5.3 5.4 5.3 5.2 5.2 5.3   Speed 6400 6400 6400 6400 6400 6400 6400   PI 3.5 3.5 3.4 3.3 3.6 3.7 3.6   Power (Jackson) 5.4 3.4 5.5 5.6 5.5 5.4 5.5   LSL - - - 6000 - - -   Low Flow Alarm - - - - - - -   Pulsatility - - - - - - -          Rounded on Timmariama Richards to ensure all mechanical assist device settings (IABP or VAD) were appropriate and all parameters were within limits.  I was able to ensure all back up equipment was present, the staff had no issues, and the Perfusion Department daily rounding was complete.      For implantable VADs: Interrogation of Ventricular assist device was performed with analysis of device parameters and review of device function. I have personally reviewed the interrogation findings and agree with findings as stated.     In emergency, the nursing units have been notified to contact the perfusion department either by:  Calling [phone] from 630am to 4pm Mon thru Fri, utilizing the On-Call Finder functionality of Epic and searching for Perfusion, or by contacting the hospital  from 4pm to 630am and on weekends and asking to speak with the perfusionist on call.    2:34 PM

## 2022-07-22 NOTE — ASSESSMENT & PLAN NOTE
Patient experienced an episode of VT on 6/30 and experienced an ICD shock thought to be related to hypokalemia (K of 3.1 on 6/30)  - Aggressively replete K, keep K>4 and Mg>2  - Continue mexiletine PO, lidocaine gtt, and amiodarone gtt   - EP is following, appreciate recs  - Continue to monitor on telemetry

## 2022-07-22 NOTE — SUBJECTIVE & OBJECTIVE
Interval History:   Yesterday, the patient experienced an episode of NSVT x 29 seconds and underwent an appropriate ICD shock which converted him back to normal sinus rhythm.  Amiodarone gtt was started.  EP was consulted and recommended continuing Amiodarone and restarting his home Mexiletine.    Overnight, the patient was hypotensive with episodes of NSVT.  His pressors were increased, and he was started on a lidocaine gtt.    LVAD RPM was increased to 6400    CVP: 7    Current drips:  Epi at 0.06  Amiodarone at 0.5  Vasopressin at 0.04  Vancomycin  Precedex at 1  Norepi at 0.04  Lidocaine at 0.5  Angiotension at 35  NO at 9 ppm    Pulses in the lower extremity edema are dopplerable.    Continuous Infusions:   amiodarone in dextrose 5% 0.5 mg/min (07/22/22 0900)    angiotensin II 35 ng/kg/min (07/22/22 0900)    dexmedetomidine (PRECEDEX) infusion 1.4 mcg/kg/hr (07/22/22 0903)    dextrose 5 % Stopped (07/22/22 0833)    EPINEPHrine 0.06 mcg/kg/min (07/22/22 0900)    furosemide (LASIX) 2 mg/mL continuous infusion (non-titrating) Stopped (07/21/22 1200)    heparin (porcine) in 5 % dex Stopped (07/22/22 0850)    LIDOcaine 0.5 mg/min (07/22/22 0900)    nitric oxide gas      NORepinephrine bitartrate-D5W 0.04 mcg/kg/min (07/22/22 0900)    propofoL Stopped (07/22/22 0751)    vasopressin 0.04 Units/min (07/22/22 0900)     Scheduled Meds:   acetylcysteine 100 mg/ml (10%)  4 mL Nebulization TID WAKE    albuterol sulfate  2.5 mg Nebulization TID WAKE    aspirin  81 mg Per OG tube Daily    guaiFENesin 100 mg/5 ml  300 mg Per NG tube Q6H    levothyroxine  112 mcg Per OG tube Before breakfast    meropenem (MERREM) IVPB  2 g Intravenous Q12H    mexiletine  250 mg Per NG tube Q8H    micafungin (MYCAMINE) IVPB  100 mg Intravenous Q24H    pantoprazole  40 mg Intravenous BID     PRN Meds:sodium chloride 0.9%, sodium chloride 0.9%, dextrose 10%, dextrose 10%, HYDROmorphone, HYDROmorphone, Pharmacy to dose Vancomycin consult **AND**  vancomycin - pharmacy to dose    Review of patient's allergies indicates:   Allergen Reactions    Lisinopril Anaphylaxis    Hydralazine analogues      Chronic constipation, impotence, dizziness     Objective:     Vital Signs (Most Recent):  Temp: 100.04 °F (37.8 °C) (07/22/22 0900)  Pulse: 99 (07/22/22 0900)  Resp: (!) 30 (07/22/22 0831)  BP: (!) 80/0 (07/22/22 0315)  SpO2: (!) 94 % (07/22/22 0752)   Vital Signs (24h Range):  Temp:  [99.32 °F (37.4 °C)-101.84 °F (38.8 °C)] 100.04 °F (37.8 °C)  Pulse:  [] 99  Resp:  [16-37] 30  SpO2:  [94 %-97 %] 94 %  BP: (68-80)/(0) 80/0  Arterial Line BP: (65-98)/(57-86) 85/72     Patient Vitals for the past 72 hrs (Last 3 readings):   Weight   07/22/22 0500 98.9 kg (218 lb)   07/21/22 1304 106.6 kg (235 lb)   07/21/22 0500 106.6 kg (235 lb)     Body mass index is 28.76 kg/m².      Intake/Output Summary (Last 24 hours) at 7/22/2022 0915  Last data filed at 7/22/2022 0900  Gross per 24 hour   Intake 6158.88 ml   Output 4850 ml   Net 1308.88 ml       Hemodynamic Parameters:       Telemetry: NSVT x 29 seconds at the longest, PVCs, normal sinus rhythm    Physical Exam  Constitutional:       Appearance: He is obese.      Comments: Intubated, sedated   HENT:      Head: Normocephalic.      Nose: Nose normal.      Mouth/Throat:      Mouth: Mucous membranes are moist.   Eyes:      General: Scleral icterus present.   Cardiovascular:      Rate and Rhythm: Normal rate and regular rhythm.      Comments: VAD hum appreciated  Pulses detected via doppler  Pulmonary:      Comments: Coarse mechanical breath sounds bilaterally  Abdominal:      General: Bowel sounds are normal.      Palpations: Abdomen is soft.   Musculoskeletal:      Cervical back: Neck supple.      Right lower leg: No edema.      Left lower leg: No edema.   Skin:     General: Skin is warm.   Neurological:      Comments: Sedated    Psychiatric:      Comments: Sedated       Significant Labs:  CBC:  Recent Labs   Lab  07/21/22  1217 07/21/22  1542 07/21/22 1918 07/21/22  1954 07/22/22  0115 07/22/22 0317 07/22/22  0801   WBC 34.57*  --  31.19*  --   --  31.70*  --    RBC 2.75*  --  2.55*  --   --  2.56*  --    HGB 7.9*  --  7.3*  --   --  7.4*  --    HCT 25.9*   < > 23.6*   < > 23* 23.8* 24*     --  249  --   --  265  --    MCV 94  --  93  --   --  93  --    MCH 28.7  --  28.6  --   --  28.9  --    MCHC 30.5*  --  30.9*  --   --  31.1*  --     < > = values in this interval not displayed.     BNP:  Recent Labs   Lab 07/17/22  0359 07/20/22 0421 07/22/22 0317   * 813* 2,579*     CMP:  Recent Labs   Lab 07/21/22 1217 07/21/22 1918 07/22/22 0317   * 123* 123*   CALCIUM 9.3 9.1 9.2   ALBUMIN 1.9* 1.8* 1.8*  1.8*   PROT 6.1 5.9* 6.2  6.2   * 146* 144   K 3.7 3.6 3.8   CO2 25 24 22*   * 112* 110   BUN 56* 52* 53*   CREATININE 2.7* 2.4* 2.6*   ALKPHOS 131 114 120  120   ALT 45* 41 38  38   AST 66* 57* 57*  57*   BILITOT 14.6* 13.1* 12.9*  12.9*      Coagulation:   Recent Labs   Lab 07/21/22 1217 07/21/22 1918 07/22/22 0317   INR 1.5* 1.8* 1.7*   APTT 31.6 37.0* 40.3*     LDH:  Recent Labs   Lab 07/20/22  0421 07/21/22 0352 07/22/22 0317   * 783* 541*     Microbiology:  Microbiology Results (last 7 days)       Procedure Component Value Units Date/Time    Blood culture [[id number]] Collected: 07/22/22 0857    Order Status: Sent Specimen: Blood from Peripheral, Antecubital, Right Updated: 07/22/22 0907    Blood culture [[id number]] Collected: 07/20/22 1450    Order Status: Completed Specimen: Blood from Wrist, Left Updated: 07/21/22 1612     Blood Culture, Routine No Growth to date      No Growth to date    Blood culture [[id number]] Collected: 07/20/22 1445    Order Status: Completed Specimen: Blood from Peripheral, Forearm, Left Updated: 07/21/22 1612     Blood Culture, Routine No Growth to date      No Growth to date    Culture, VAP (BAL) [[id number]] Collected: 07/20/22 1120    Order  Status: Completed Specimen: Bronchial Alveolar Lavage from BAL, RM Updated: 07/21/22 0802     VAP BAL CULTURE Normal respiratory sachin     Gram Stain (Respiratory) Few WBC's     Gram Stain (Respiratory) Rare Gram positive cocci     Gram Stain (Respiratory) Rare Gram negative rods    Blood culture [[id number]]     Order Status: Canceled Specimen: Blood     Blood culture [[id number]]     Order Status: Canceled Specimen: Blood             I have reviewed all pertinent labs within the past 24 hours.    Estimated Creatinine Clearance: 39.7 mL/min (A) (based on SCr of 2.6 mg/dL (H)).    Diagnostic Results:  I have reviewed and interpreted all pertinent imaging results/findings within the past 24 hours.

## 2022-07-22 NOTE — PROGRESS NOTES
[facility]  Heart Transplant  Progress Note    Patient Name: Tim Richards  MRN: [id number]  Admission Date: 6/27/2022  Hospital Length of Stay: 25 days  Attending Physician: Yg Kaufman MD  Primary Care Provider: Deyanira Booth MD  Principal Problem:Left ventricular assist device (LVAD) complication    Subjective:     Interval History:   Yesterday, the patient experienced an episode of NSVT x 29 seconds and underwent an appropriate ICD shock which converted him back to normal sinus rhythm.  Amiodarone gtt was started.  EP was consulted and recommended continuing Amiodarone and restarting his home Mexiletine.    Overnight, the patient was hypotensive with episodes of NSVT.  His pressors were increased, and he was started on a lidocaine gtt.    LVAD RPM was increased to 6400    CVP: 7    Current drips:  Epi at 0.06  Amiodarone at 0.5  Vasopressin at 0.04  Vancomycin  Precedex at 1  Norepi at 0.04  Lidocaine at 0.5  Angiotension at 35  NO at 9 ppm    Pulses in the lower extremity edema are dopplerable.    Continuous Infusions:   amiodarone in dextrose 5% 0.5 mg/min (07/22/22 0900)    angiotensin II 35 ng/kg/min (07/22/22 0900)    dexmedetomidine (PRECEDEX) infusion 1.4 mcg/kg/hr (07/22/22 0903)    dextrose 5 % Stopped (07/22/22 0833)    EPINEPHrine 0.06 mcg/kg/min (07/22/22 0900)    furosemide (LASIX) 2 mg/mL continuous infusion (non-titrating) Stopped (07/21/22 1200)    heparin (porcine) in 5 % dex Stopped (07/22/22 0850)    LIDOcaine 0.5 mg/min (07/22/22 0900)    nitric oxide gas      NORepinephrine bitartrate-D5W 0.04 mcg/kg/min (07/22/22 0900)    propofoL Stopped (07/22/22 0751)    vasopressin 0.04 Units/min (07/22/22 0900)     Scheduled Meds:   acetylcysteine 100 mg/ml (10%)  4 mL Nebulization TID WAKE    albuterol sulfate  2.5 mg Nebulization TID WAKE    aspirin  81 mg Per OG tube Daily    guaiFENesin 100 mg/5 ml  300 mg Per NG tube Q6H    levothyroxine  112 mcg  Per OG tube Before breakfast    meropenem (MERREM) IVPB  2 g Intravenous Q12H    mexiletine  250 mg Per NG tube Q8H    micafungin (MYCAMINE) IVPB  100 mg Intravenous Q24H    pantoprazole  40 mg Intravenous BID     PRN Meds:sodium chloride 0.9%, sodium chloride 0.9%, dextrose 10%, dextrose 10%, HYDROmorphone, HYDROmorphone, Pharmacy to dose Vancomycin consult **AND** vancomycin - pharmacy to dose    Review of patient's allergies indicates:   Allergen Reactions    Lisinopril Anaphylaxis    Hydralazine analogues      Chronic constipation, impotence, dizziness     Objective:     Vital Signs (Most Recent):  Temp: 100.04 °F (37.8 °C) (07/22/22 0900)  Pulse: 99 (07/22/22 0900)  Resp: (!) 30 (07/22/22 0831)  BP: (!) 80/0 (07/22/22 0315)  SpO2: (!) 94 % (07/22/22 0752)   Vital Signs (24h Range):  Temp:  [99.32 °F (37.4 °C)-101.84 °F (38.8 °C)] 100.04 °F (37.8 °C)  Pulse:  [] 99  Resp:  [16-37] 30  SpO2:  [94 %-97 %] 94 %  BP: (68-80)/(0) 80/0  Arterial Line BP: (65-98)/(57-86) 85/72     Patient Vitals for the past 72 hrs (Last 3 readings):   Weight   07/22/22 0500 98.9 kg (218 lb)   07/21/22 1304 106.6 kg (235 lb)   07/21/22 0500 106.6 kg (235 lb)     Body mass index is 28.76 kg/m².      Intake/Output Summary (Last 24 hours) at 7/22/2022 0915  Last data filed at 7/22/2022 0900  Gross per 24 hour   Intake 6158.88 ml   Output 4850 ml   Net 1308.88 ml       Hemodynamic Parameters:       Telemetry: NSVT x 29 seconds at the longest, PVCs, normal sinus rhythm    Physical Exam  Constitutional:       Appearance: He is obese.      Comments: Intubated, sedated   HENT:      Head: Normocephalic.      Nose: Nose normal.      Mouth/Throat:      Mouth: Mucous membranes are moist.   Eyes:      General: Scleral icterus present.   Cardiovascular:      Rate and Rhythm: Normal rate and regular rhythm.      Comments: VAD hum appreciated  Pulses detected via doppler  Pulmonary:      Comments: Coarse mechanical breath sounds  bilaterally  Abdominal:      General: Bowel sounds are normal.      Palpations: Abdomen is soft.   Musculoskeletal:      Cervical back: Neck supple.      Right lower leg: No edema.      Left lower leg: No edema.   Skin:     General: Skin is warm.   Neurological:      Comments: Sedated    Psychiatric:      Comments: Sedated       Significant Labs:  CBC:  Recent Labs   Lab 07/21/22  1217 07/21/22  1542 07/21/22 1918 07/21/22  1954 07/22/22  0115 07/22/22  0317 07/22/22  0801   WBC 34.57*  --  31.19*  --   --  31.70*  --    RBC 2.75*  --  2.55*  --   --  2.56*  --    HGB 7.9*  --  7.3*  --   --  7.4*  --    HCT 25.9*   < > 23.6*   < > 23* 23.8* 24*     --  249  --   --  265  --    MCV 94  --  93  --   --  93  --    MCH 28.7  --  28.6  --   --  28.9  --    MCHC 30.5*  --  30.9*  --   --  31.1*  --     < > = values in this interval not displayed.     BNP:  Recent Labs   Lab 07/17/22  0359 07/20/22  0421 07/22/22 0317   * 813* 2,579*     CMP:  Recent Labs   Lab 07/21/22 1217 07/21/22 1918 07/22/22 0317   * 123* 123*   CALCIUM 9.3 9.1 9.2   ALBUMIN 1.9* 1.8* 1.8*  1.8*   PROT 6.1 5.9* 6.2  6.2   * 146* 144   K 3.7 3.6 3.8   CO2 25 24 22*   * 112* 110   BUN 56* 52* 53*   CREATININE 2.7* 2.4* 2.6*   ALKPHOS 131 114 120  120   ALT 45* 41 38  38   AST 66* 57* 57*  57*   BILITOT 14.6* 13.1* 12.9*  12.9*      Coagulation:   Recent Labs   Lab 07/21/22 1217 07/21/22 1918 07/22/22 0317   INR 1.5* 1.8* 1.7*   APTT 31.6 37.0* 40.3*     LDH:  Recent Labs   Lab 07/20/22  0421 07/21/22  0352 07/22/22  0317   * 783* 541*     Microbiology:  Microbiology Results (last 7 days)       Procedure Component Value Units Date/Time    Blood culture [[id number]] Collected: 07/22/22 0857    Order Status: Sent Specimen: Blood from Peripheral, Antecubital, Right Updated: 07/22/22 0907    Blood culture [[id number]] Collected: 07/20/22 1450    Order Status: Completed Specimen: Blood from Wrist, Left  Updated: 07/21/22 1612     Blood Culture, Routine No Growth to date      No Growth to date    Blood culture [[id number]] Collected: 07/20/22 1445    Order Status: Completed Specimen: Blood from Peripheral, Forearm, Left Updated: 07/21/22 1612     Blood Culture, Routine No Growth to date      No Growth to date    Culture, VAP (BAL) [[id number]] Collected: 07/20/22 1120    Order Status: Completed Specimen: Bronchial Alveolar Lavage from BAL, RML Updated: 07/21/22 0802     VAP BAL CULTURE Normal respiratory sachin     Gram Stain (Respiratory) Few WBC's     Gram Stain (Respiratory) Rare Gram positive cocci     Gram Stain (Respiratory) Rare Gram negative rods    Blood culture [[id number]]     Order Status: Canceled Specimen: Blood     Blood culture [[id number]]     Order Status: Canceled Specimen: Blood             I have reviewed all pertinent labs within the past 24 hours.    Estimated Creatinine Clearance: 39.7 mL/min (A) (based on SCr of 2.6 mg/dL (H)).    Diagnostic Results:  I have reviewed and interpreted all pertinent imaging results/findings within the past 24 hours.    Assessment and Plan:     57 Y/O M with Hx of stage D HFrEF (EF 10%) due to NICM underwent HM2 implant 3/8/18 and exchange of outflow graft 3/9/18, Hx VT/VF s/p SICD 2014 presenting with gradual worsening shortness of breath associated with nonpleuritic, nonradiating bilateral 4/10 retrosternal chest pressure pain.  Symptoms began yesterday and have gradually worsened in the last 12 hours.  He does report going to a family picnic with increased consumption of chicken wings, ribs and other salty meat products.  Prior to symptom onset, he reports nausea, nonbloody diarrhea began yesterday and single episode of nonbloody nonbilious vomiting this morning. He also complains of dizziness, lightheadedness, and a mild HA. SOB worsened this morning prompting him to come to the ED.     In the ED, patient was in respiratory distress requiring BiPAP. MAP 96 and  started on Nitro gtt. Work-up revealed WBC 10, K 5.4, Cr 2.5 (baseline 1.9), BNP 1950 (baseline 200-300s), Bili 2.1, LDH 1058, and INR 3.2. Bedside TTE with severely reduced EF, AV not opening, septum bulging into RV. Given IV Lasix 80 mg x1 with only 100-200 mL UOP and dark in color. HM2 interrogated and flows have been 8.5-9 L/min with no alarms or power surges. Cardiology consulted in the ED, dicussed with HTS, and decision made to admit to ICU for further mgmt.       * Left ventricular assist device (LVAD) complication  The patient presented with COVID and found to have an uptrending LDH with increased power.  He underwent HM III upgrade on 7/13/22  -Daily LDH   -Continue Heparin drip    Procedure: Device Interrogation Including analysis of device parameters  Current Settings: Ventricular Assist Device    TXP LVAD INTERROGATIONS 7/22/2022 7/22/2022 7/22/2022 7/22/2022 7/22/2022 7/22/2022 7/22/2022   Type HeartMate3 HeartMate3 HeartMate3 HeartMate3 HeartMate3 HeartMate3 HeartMate3   Flow 5.2 5.3 5.3 5.5 5.4 5.5 5.5   Speed 6400 6400 6400 6400 6400 6400 6400   PI 3.7 3.6 3.6 3.2 3.3 3.3 3.3   Power (Jackson) 5.4 5.5 5.5 5.5 5.5 5.5 5.4   LSL - - 6000 6000 6000 6000 6000   Low Flow Alarm - - - - - - -   Pulsatility - - Intermittent pulse Intermittent pulse Intermittent pulse Intermittent pulse Intermittent pulse       Anxiety  -Currently intubated and sedated    History of ventricular tachycardia  Patient experienced an episode of VT on 6/30 and experienced an ICD shock thought to be related to hypokalemia (K of 3.1 on 6/30)  - Aggressively replete K, keep K>4 and Mg>2  - Continue mexiletine PO, lidocaine gtt, and amiodarone gtt   - EP is following, appreciate recs  - Continue to monitor on telemetry    COVID-19  -Previously hypoxic then recovered  -ID was consulted, recommended Remdesivir, course completed    Elevated serum lactate dehydrogenase (LDH)  Concerned for pump thrombosis  Cont to trend  Plan for pump  exchange today    amiodarone induced hypothyrodism  -Continue levothyroxine 112 mcg     LVAD (left ventricular assist device) present  Procedure: Device Interrogation Including analysis of device parameters  Current Settings: Ventricular Assist Device  Review of device function is stable    TXP LVAD INTERROGATIONS 7/22/2022 7/22/2022 7/22/2022 7/22/2022 7/22/2022 7/22/2022 7/22/2022   Type HeartMate3 HeartMate3 HeartMate3 HeartMate3 HeartMate3 HeartMate3 HeartMate3   Flow 5.2 5.3 5.3 5.5 5.4 5.5 5.5   Speed 6400 6400 6400 6400 6400 6400 6400   PI 3.7 3.6 3.6 3.2 3.3 3.3 3.3   Power (Jackson) 5.4 5.5 5.5 5.5 5.5 5.5 5.4   LSL - - 6000 6000 6000 6000 6000   Low Flow Alarm - - - - - - -   Pulsatility - - Intermittent pulse Intermittent pulse Intermittent pulse Intermittent pulse Intermittent pulse       CKD (chronic kidney disease), stage III  WAGNER on CKD  Patient's baseline is 1.5-2.0  - Nephrology is following  - Avoiding nephrotoxic medications  - Continue to monitor  - Strict I/O's    Chronic combined systolic and diastolic heart failure  #ADHF  #NICMP  Underwent HM III upgrade on 7/13/22, currently on [facility] ECMO  Currently on Epi gtt, Vasopressin, Norepi, and Angiotension III  Currently on Precedex gtt  Being treated for sepsis  Currently intubated and sedated   Plan for chest tube removal, bronch, and old driveline removal today           Harry Canales MD  Heart Transplant  [facility]

## 2022-07-22 NOTE — PROGRESS NOTES
Notified Dr. Kaufman old  II site bleeding.  MD at bedside, cauterized site with bovie and dsg applied.

## 2022-07-22 NOTE — SUBJECTIVE & OBJECTIVE
Interval History: decannulated from ECMO 07/20/2022  Intubated with relatively unstable overnight, was febrile with hypotension requiring increase in pressors, he was started on angiotensin, he is on epi gtt, lidocaine gtt, levophed gtt, and remains on heparin gtt  Off lasix and metolazone overnight. Warfarin started on 7/20 and mexiletine started this morning   Antibiotics: meropenem and cefepime   7/20 +VAP Cx Gram negative jodie    Renal Tube feeds on hold overnight and this morning    sNa  150->149->IVF bolus and D5W->148->143   Mg->2.9->3.1->?2->3.2->2.9    T.bili 12->10.3->?0.9->15.9->13.5    sCr 4.1-->2.7 ->2.4->2.6->2.8 , BUN 60->50 (has been off tube feeds)    I/O: 5.9/4.6L urine and 450ml from drains and 110mls from chest tube, net +720mls in the last 24 hours    Family at bedside    Review of patient's allergies indicates:   Allergen Reactions    Lisinopril Anaphylaxis    Hydralazine analogues      Chronic constipation, impotence, dizziness     Current Facility-Administered Medications   Medication Frequency    0.9%  NaCl infusion PRN    0.9%  NaCl infusion PRN    acetylcysteine 100 mg/ml (10%) solution 4 mL TID WAKE    albuterol sulfate nebulizer solution 2.5 mg TID WAKE    amiodarone 360 mg/200 mL (1.8 mg/mL) infusion Continuous    angiotensin II (GIAPREZA) 2,500,000 ng in sodium chloride 0.9% 250 mL infusion Continuous    aspirin chewable tablet 81 mg Daily    dexmedetomidine (PRECEDEX) 400mcg/100mL 0.9% NaCL infusion Continuous    dextrose 10% bolus 125 mL PRN    dextrose 10% bolus 250 mL PRN    EPINEPHrine (ADRENALIN) 10 mg in dextrose 5 % 250 mL infusion Continuous    furosemide (LASIX) 200 mg in dextrose 5 % 100 mL continuous infusion (conc: 2 mg/mL) Continuous    guaiFENesin 100 mg/5 ml syrup 300 mg Q6H    heparin 25,000 units in dextrose 5% 250 mL (100 units/mL) infusion (heparin infusion - NO NOMOGRAM) Continuous    HYDROmorphone injection 0.5 mg Q4H PRN    HYDROmorphone injection 1 mg Q4H PRN     levothyroxine tablet 112 mcg Before breakfast    LIDOcaine 2000 mg in D5W 250 mL infusion Continuous    meropenem (MERREM) 2 g in sodium chloride 0.9% 100 mL IVPB Q12H    mexiletine capsule 250 mg Q8H    micafungin 100 mg in sodium chloride 0.9 % 100 mL IVPB (ready to mix system) Q24H    midodrine tablet 10 mg Q8H    nitric oxide gas Gas 10 ppm Continuous    NORepinephrine 8 mg in dextrose 5% 250 mL infusion Continuous    pantoprazole injection 40 mg BID    propofol (DIPRIVAN) 10 mg/mL infusion Continuous    vancomycin - pharmacy to dose pharmacy to manage frequency    vasopressin (PITRESSIN) 1 Units/mL in dextrose 5 % 100 mL infusion Continuous       Objective:     Vital Signs (Most Recent):  Temp: (!) 101.48 °F (38.6 °C) (07/22/22 1328)  Pulse: 82 (07/22/22 1328)  Resp: (!) 21 (07/22/22 1315)  BP: (!) 80/0 (07/22/22 0806)  SpO2: (!) 94 % (07/22/22 1144)  O2 Device (Oxygen Therapy): ventilator (07/22/22 1315)   Vital Signs (24h Range):  Temp:  [99.32 °F (37.4 °C)-101.84 °F (38.8 °C)] 101.48 °F (38.6 °C)  Pulse:  [] 82  Resp:  [16-32] 21  SpO2:  [94 %-97 %] 94 %  BP: (80)/(0) 80/0  Arterial Line BP: (65-98)/(57-86) 80/70     Weight: 98.9 kg (218 lb) (07/22/22 0500)  Body mass index is 28.76 kg/m².  Body surface area is 2.26 meters squared.    I/O last 3 completed shifts:  In: 7902.9 [I.V.:4702.8; NG/GT:1085; IV Piggyback:2115.1]  Out: 8825 [Urine:7625; Drains:850; Chest Tube:350]    Physical Exam  Vitals and nursing note reviewed.   Constitutional:       General: He is in acute distress.      Appearance: He is ill-appearing. He is not toxic-appearing or diaphoretic.   HENT:      Mouth/Throat:      Mouth: Mucous membranes are moist.   Eyes:      General: No scleral icterus.  Cardiovascular:      Rate and Rhythm: Normal rate and regular rhythm.      Pulses: Normal pulses.      Heart sounds: Murmur heard.      Comments: Intubated   + R IJ Trialysis catheter   Pulmonary:      Effort: Pulmonary effort is normal. No  respiratory distress.      Breath sounds: Normal breath sounds. No stridor. No wheezing, rhonchi or rales.      Comments: Intubated  Chest tube   Abdominal:      General: There is no distension.      Palpations: There is no mass.   Genitourinary:     Comments: Dark yellow colored urine in sun collection bag   Musculoskeletal:         General: Swelling present. No tenderness, deformity or signs of injury.      Right lower leg: Edema present.      Left lower leg: Edema present.   Skin:     General: Skin is warm.      Capillary Refill: Capillary refill takes 2 to 3 seconds.      Coloration: Skin is pale. Skin is not jaundiced.      Findings: No bruising, erythema, lesion or rash.   Neurological:      Comments: Alert, nods to yes and no questions       Significant Labs:  ABGs:   Recent Labs   Lab 07/22/22  0801   PH 7.399   PCO2 39.5   HCO3 24.4   POCSATURATED 94*   BE 0       CBC:   Recent Labs   Lab 07/22/22  1224   WBC 28.94*   RBC 2.41*   HGB 6.8*   HCT 21.6*      MCV 90   MCH 28.2   MCHC 31.5*       CMP:   Recent Labs   Lab 07/22/22  0858   *   CALCIUM 9.3   ALBUMIN 1.8*   PROT 6.4      K 4.1   CO2 24      BUN 50*   CREATININE 2.8*   ALKPHOS 118   ALT 41   AST 59*   BILITOT 13.5*       LFTs:   Recent Labs   Lab 07/22/22  0858   ALT 41   AST 59*   ALKPHOS 118   BILITOT 13.5*   PROT 6.4   ALBUMIN 1.8*       All labs within the past 24 hours have been reviewed.     Significant Imaging:  Labs: Reviewed  X-Ray: Reviewed

## 2022-07-22 NOTE — NURSING
2034-  MD Shae notified of VS, gtts, CVP, ABG, labs, VAD numbers, Marilia, vent settings, I & O.  Orders received and implemented to increase levophed gtt to 0.08 mcg/kg/min., start giapreza at 20 ng/kg/min., call back in an hour.      2052-  MD Shae called and the following orders were received and implemented: increase epinephrine gtt to 0.1 mcg/kg/min., increase vasopressin gtt to 0.06 units/min., have giapreza at bedside stat, call back with updates in an hour.    2116-  MD Shae notified of increasing pressor requirements with MAPs in low 60s and dusky fingers.  Orders received and implemented to turn off vasopressin, levophed at 0.2 mcg/kg/min., epinephrine 0.08 mcg/kg/min.    2133-  Called pharmacy to check on giapreza status.  Jessica with pharmacy stated gtt was currently being made.    2153-  MD Susan notified of hypotension and multiple runs of vtach.  Stated coming to bedside.    2154-  Called pharmacy to check on giapreza status.  Jessica with pharmacy stated it was out for delivery at 2139.  Rechecked with tech~ tech on the way to room to deliver medication.    2201-  MD Shae notified that giapreza hanging, VS, gtts, multiple runs of vtach.  The following orders were received and implemented:  100 mg lidocaine push STAT, lidocaine gtt at 1 mg/min., 1 g calcium gluconate, increase giapreza gtt to 50 ng/kg/min., do not decrease levophed lower than 0.04 mcg/kg/min., do not decrease epinephrine gtt below 0.04 mcg/kg.min., do not decrease vasopressin gtt below 0.04 units/min., restart mexalatine tomorrow, do not administer vancomycin tomorrow until trough level received, okay to wean giapreza as tolerated once epinephrine, levophed, vasopressin at desired rates as stated above.  MD Susan at bedside throughout conversation.

## 2022-07-22 NOTE — ASSESSMENT & PLAN NOTE
Procedure: Device Interrogation Including analysis of device parameters  Current Settings: Ventricular Assist Device  Review of device function is stable    TXP LVAD INTERROGATIONS 7/22/2022 7/22/2022 7/22/2022 7/22/2022 7/22/2022 7/22/2022 7/22/2022   Type HeartMate3 HeartMate3 HeartMate3 HeartMate3 HeartMate3 HeartMate3 HeartMate3   Flow 5.2 5.3 5.3 5.5 5.4 5.5 5.5   Speed 6400 6400 6400 6400 6400 6400 6400   PI 3.7 3.6 3.6 3.2 3.3 3.3 3.3   Power (Jackson) 5.4 5.5 5.5 5.5 5.5 5.5 5.4   LSL - - 6000 6000 6000 6000 6000   Low Flow Alarm - - - - - - -   Pulsatility - - Intermittent pulse Intermittent pulse Intermittent pulse Intermittent pulse Intermittent pulse

## 2022-07-22 NOTE — ASSESSMENT & PLAN NOTE
-Ischemic ATN 2/2 decrease perfusion severe hypotension in a patient with known CKD 3b  Baseline sCr: 1.9-2.3  On admission his Cr was: 2.5  Lab Results   Component Value Date    CREATININE 2.8 (H) 07/22/2022     7/15 urine microscopy showed: many granular casts, muddy brown casts, waxy casts, some WBCS, some yeast, and occasional RBCs    Recommendations:   -PRN lasix IV Push if pt continues to be net+   -corrected Ca 11 maybe d/t volume contraction   -Electrolytes: K 3.7, renal diet, page nephrology if K >5.5    Phos level 4.1, no phos binders needed at this time    Mg 2.9, goal <2.5  -Acid/base: AGMA 2/2 ATN  -Fluid balance: pt is 3rd spacing as his albumin is 2   -Anemia: Hgb 6.8, send anemia panel labs, transfuse for Hgb <7.0  -Strict I/O's and daily weights  -Renal function panel and Mg levels Q8H   -Renal ultrasound reviewed: no hydronephrosis and minimal CKD changes  -Maintain MAP >65 for renal perfusion    There is no immediate indication for KRT at this time

## 2022-07-23 PROBLEM — R57.9 SHOCK: Status: ACTIVE | Noted: 2022-07-23

## 2022-07-23 LAB
ABO + RH BLD: NORMAL
ALBUMIN SERPL BCP-MCNC: 1.7 G/DL (ref 3.5–5.2)
ALLENS TEST: ABNORMAL
ALLENS TEST: NORMAL
ALP SERPL-CCNC: 101 U/L (ref 55–135)
ALP SERPL-CCNC: 102 U/L (ref 55–135)
ALT SERPL W/O P-5'-P-CCNC: 31 U/L (ref 10–44)
ALT SERPL W/O P-5'-P-CCNC: 33 U/L (ref 10–44)
ALT SERPL W/O P-5'-P-CCNC: 33 U/L (ref 10–44)
ALT SERPL W/O P-5'-P-CCNC: 34 U/L (ref 10–44)
ANION GAP SERPL CALC-SCNC: 10 MMOL/L (ref 8–16)
ANION GAP SERPL CALC-SCNC: 10 MMOL/L (ref 8–16)
ANION GAP SERPL CALC-SCNC: 12 MMOL/L (ref 8–16)
ANION GAP SERPL CALC-SCNC: 12 MMOL/L (ref 8–16)
ANISOCYTOSIS BLD QL SMEAR: SLIGHT
APTT BLDCRRT: 32.5 SEC (ref 21–32)
APTT BLDCRRT: 32.9 SEC (ref 21–32)
APTT BLDCRRT: 34.5 SEC (ref 21–32)
APTT BLDCRRT: 35.6 SEC (ref 21–32)
AST SERPL-CCNC: 49 U/L (ref 10–40)
AST SERPL-CCNC: 50 U/L (ref 10–40)
AST SERPL-CCNC: 51 U/L (ref 10–40)
AST SERPL-CCNC: 55 U/L (ref 10–40)
BACTERIA BAL AEROBE CULT: ABNORMAL
BASO STIPL BLD QL SMEAR: ABNORMAL
BASOPHILS # BLD AUTO: 0.04 K/UL (ref 0–0.2)
BASOPHILS # BLD AUTO: ABNORMAL K/UL (ref 0–0.2)
BASOPHILS NFR BLD: 0 % (ref 0–1.9)
BASOPHILS NFR BLD: 0.1 % (ref 0–1.9)
BILIRUB SERPL-MCNC: 10.7 MG/DL (ref 0.1–1)
BILIRUB SERPL-MCNC: 11 MG/DL (ref 0.1–1)
BILIRUB SERPL-MCNC: 11.2 MG/DL (ref 0.1–1)
BILIRUB SERPL-MCNC: 11.3 MG/DL (ref 0.1–1)
BLD GP AB SCN CELLS X3 SERPL QL: NORMAL
BLD PROD TYP BPU: NORMAL
BLOOD UNIT EXPIRATION DATE: NORMAL
BLOOD UNIT TYPE CODE: 5100
BLOOD UNIT TYPE: NORMAL
BUN SERPL-MCNC: 40 MG/DL (ref 6–20)
BUN SERPL-MCNC: 41 MG/DL (ref 6–20)
BUN SERPL-MCNC: 44 MG/DL (ref 6–20)
BUN SERPL-MCNC: 45 MG/DL (ref 6–20)
BURR CELLS BLD QL SMEAR: ABNORMAL
CA-I BLDV-SCNC: 1.14 MMOL/L (ref 1.06–1.42)
CA-I BLDV-SCNC: 1.16 MMOL/L (ref 1.06–1.42)
CALCIUM SERPL-MCNC: 8.2 MG/DL (ref 8.7–10.5)
CALCIUM SERPL-MCNC: 9 MG/DL (ref 8.7–10.5)
CALCIUM SERPL-MCNC: 9.1 MG/DL (ref 8.7–10.5)
CALCIUM SERPL-MCNC: 9.1 MG/DL (ref 8.7–10.5)
CHLORIDE SERPL-SCNC: 107 MMOL/L (ref 95–110)
CHLORIDE SERPL-SCNC: 109 MMOL/L (ref 95–110)
CHLORIDE SERPL-SCNC: 109 MMOL/L (ref 95–110)
CHLORIDE SERPL-SCNC: 110 MMOL/L (ref 95–110)
CO2 SERPL-SCNC: 22 MMOL/L (ref 23–29)
CO2 SERPL-SCNC: 22 MMOL/L (ref 23–29)
CO2 SERPL-SCNC: 23 MMOL/L (ref 23–29)
CO2 SERPL-SCNC: 24 MMOL/L (ref 23–29)
CODING SYSTEM: NORMAL
CREAT SERPL-MCNC: 2.1 MG/DL (ref 0.5–1.4)
CREAT SERPL-MCNC: 2.1 MG/DL (ref 0.5–1.4)
CREAT SERPL-MCNC: 2.3 MG/DL (ref 0.5–1.4)
CREAT SERPL-MCNC: 2.3 MG/DL (ref 0.5–1.4)
DACRYOCYTES BLD QL SMEAR: ABNORMAL
DELSYS: ABNORMAL
DELSYS: NORMAL
DIFFERENTIAL METHOD: ABNORMAL
DISPENSE STATUS: NORMAL
EOSINOPHIL # BLD AUTO: 0 K/UL (ref 0–0.5)
EOSINOPHIL # BLD AUTO: ABNORMAL K/UL (ref 0–0.5)
EOSINOPHIL NFR BLD: 0 % (ref 0–8)
EOSINOPHIL NFR BLD: 0.1 % (ref 0–8)
ERYTHROCYTE [DISTWIDTH] IN BLOOD BY AUTOMATED COUNT: 18.2 % (ref 11.5–14.5)
ERYTHROCYTE [DISTWIDTH] IN BLOOD BY AUTOMATED COUNT: 18.8 % (ref 11.5–14.5)
ERYTHROCYTE [SEDIMENTATION RATE] IN BLOOD BY WESTERGREN METHOD: 16 MM/H
EST. GFR  (AFRICAN AMERICAN): 35.6 ML/MIN/1.73 M^2
EST. GFR  (AFRICAN AMERICAN): 35.6 ML/MIN/1.73 M^2
EST. GFR  (AFRICAN AMERICAN): 39.8 ML/MIN/1.73 M^2
EST. GFR  (AFRICAN AMERICAN): 39.8 ML/MIN/1.73 M^2
EST. GFR  (NON AFRICAN AMERICAN): 30.8 ML/MIN/1.73 M^2
EST. GFR  (NON AFRICAN AMERICAN): 30.8 ML/MIN/1.73 M^2
EST. GFR  (NON AFRICAN AMERICAN): 34.4 ML/MIN/1.73 M^2
EST. GFR  (NON AFRICAN AMERICAN): 34.4 ML/MIN/1.73 M^2
FIBRINOGEN PPP-MCNC: >800 MG/DL (ref 182–400)
FIO2: 50
GIANT PLATELETS BLD QL SMEAR: PRESENT
GLUCOSE SERPL-MCNC: 110 MG/DL (ref 70–110)
GLUCOSE SERPL-MCNC: 112 MG/DL (ref 70–110)
GLUCOSE SERPL-MCNC: 116 MG/DL (ref 70–110)
GLUCOSE SERPL-MCNC: 118 MG/DL (ref 70–110)
GRAM STN SPEC: ABNORMAL
HCO3 UR-SCNC: 22.9 MMOL/L (ref 24–28)
HCO3 UR-SCNC: 23.5 MMOL/L (ref 24–28)
HCO3 UR-SCNC: 23.6 MMOL/L (ref 24–28)
HCO3 UR-SCNC: 25 MMOL/L (ref 24–28)
HCT VFR BLD AUTO: 20.5 % (ref 40–54)
HCT VFR BLD AUTO: 23.1 % (ref 40–54)
HCT VFR BLD AUTO: 23.7 % (ref 40–54)
HCT VFR BLD AUTO: 24.5 % (ref 40–54)
HCT VFR BLD CALC: 21 %PCV (ref 36–54)
HCT VFR BLD CALC: 23 %PCV (ref 36–54)
HGB BLD-MCNC: 6.6 G/DL (ref 14–18)
HGB BLD-MCNC: 7.5 G/DL (ref 14–18)
HGB BLD-MCNC: 7.6 G/DL (ref 14–18)
HGB BLD-MCNC: 7.8 G/DL (ref 14–18)
HYPOCHROMIA BLD QL SMEAR: ABNORMAL
IMM GRANULOCYTES # BLD AUTO: 0.94 K/UL (ref 0–0.04)
IMM GRANULOCYTES # BLD AUTO: 1.09 K/UL (ref 0–0.04)
IMM GRANULOCYTES # BLD AUTO: 1.11 K/UL (ref 0–0.04)
IMM GRANULOCYTES # BLD AUTO: ABNORMAL K/UL (ref 0–0.04)
IMM GRANULOCYTES NFR BLD AUTO: 3.1 % (ref 0–0.5)
IMM GRANULOCYTES NFR BLD AUTO: 3.9 % (ref 0–0.5)
IMM GRANULOCYTES NFR BLD AUTO: 4 % (ref 0–0.5)
IMM GRANULOCYTES NFR BLD AUTO: ABNORMAL % (ref 0–0.5)
INR PPP: 1.2 (ref 0.8–1.2)
INR PPP: 1.3 (ref 0.8–1.2)
LDH SERPL L TO P-CCNC: 0.74 MMOL/L (ref 0.36–1.25)
LDH SERPL L TO P-CCNC: 0.74 MMOL/L (ref 0.36–1.25)
LDH SERPL L TO P-CCNC: 0.75 MMOL/L (ref 0.36–1.25)
LDH SERPL L TO P-CCNC: 0.85 MMOL/L (ref 0.36–1.25)
LDH SERPL L TO P-CCNC: 479 U/L (ref 110–260)
LYMPHOCYTES # BLD AUTO: 1 K/UL (ref 1–4.8)
LYMPHOCYTES # BLD AUTO: 1.1 K/UL (ref 1–4.8)
LYMPHOCYTES # BLD AUTO: 1.1 K/UL (ref 1–4.8)
LYMPHOCYTES # BLD AUTO: ABNORMAL K/UL (ref 1–4.8)
LYMPHOCYTES NFR BLD: 3.4 % (ref 18–48)
LYMPHOCYTES NFR BLD: 3.5 % (ref 18–48)
LYMPHOCYTES NFR BLD: 4 % (ref 18–48)
LYMPHOCYTES NFR BLD: 5 % (ref 18–48)
MAGNESIUM SERPL-MCNC: 2.6 MG/DL (ref 1.6–2.6)
MAGNESIUM SERPL-MCNC: 2.7 MG/DL (ref 1.6–2.6)
MAGNESIUM SERPL-MCNC: 2.7 MG/DL (ref 1.6–2.6)
MAGNESIUM SERPL-MCNC: 2.8 MG/DL (ref 1.6–2.6)
MCH RBC QN AUTO: 28.7 PG (ref 27–31)
MCH RBC QN AUTO: 29.4 PG (ref 27–31)
MCH RBC QN AUTO: 29.5 PG (ref 27–31)
MCH RBC QN AUTO: 29.5 PG (ref 27–31)
MCHC RBC AUTO-ENTMCNC: 31.8 G/DL (ref 32–36)
MCHC RBC AUTO-ENTMCNC: 32.1 G/DL (ref 32–36)
MCHC RBC AUTO-ENTMCNC: 32.2 G/DL (ref 32–36)
MCHC RBC AUTO-ENTMCNC: 32.5 G/DL (ref 32–36)
MCV RBC AUTO: 89 FL (ref 82–98)
MCV RBC AUTO: 91 FL (ref 82–98)
MCV RBC AUTO: 92 FL (ref 82–98)
MCV RBC AUTO: 93 FL (ref 82–98)
METAMYELOCYTES NFR BLD MANUAL: 2 %
MODE: ABNORMAL
MODE: NORMAL
MONOCYTES # BLD AUTO: 1.9 K/UL (ref 0.3–1)
MONOCYTES # BLD AUTO: 1.9 K/UL (ref 0.3–1)
MONOCYTES # BLD AUTO: 2 K/UL (ref 0.3–1)
MONOCYTES # BLD AUTO: ABNORMAL K/UL (ref 0.3–1)
MONOCYTES NFR BLD: 4 % (ref 4–15)
MONOCYTES NFR BLD: 6.6 % (ref 4–15)
MONOCYTES NFR BLD: 6.7 % (ref 4–15)
MONOCYTES NFR BLD: 6.8 % (ref 4–15)
MYELOCYTES NFR BLD MANUAL: 1 %
NEUTROPHILS # BLD AUTO: 23.1 K/UL (ref 1.8–7.7)
NEUTROPHILS # BLD AUTO: 24.6 K/UL (ref 1.8–7.7)
NEUTROPHILS # BLD AUTO: 25.8 K/UL (ref 1.8–7.7)
NEUTROPHILS NFR BLD: 83 % (ref 38–73)
NEUTROPHILS NFR BLD: 85 % (ref 38–73)
NEUTROPHILS NFR BLD: 85.8 % (ref 38–73)
NEUTROPHILS NFR BLD: 86.6 % (ref 38–73)
NEUTS BAND NFR BLD MANUAL: 5 %
NRBC BLD-RTO: 1 /100 WBC
NUM UNITS TRANS PACKED RBC: NORMAL
OVALOCYTES BLD QL SMEAR: ABNORMAL
PCO2 BLDA: 35 MMHG (ref 35–45)
PCO2 BLDA: 35.3 MMHG (ref 35–45)
PCO2 BLDA: 36.6 MMHG (ref 35–45)
PCO2 BLDA: 38.1 MMHG (ref 35–45)
PEEP: 5
PH SMN: 7.42 [PH] (ref 7.35–7.45)
PH SMN: 7.43 [PH] (ref 7.35–7.45)
PHOSPHATE SERPL-MCNC: 3.1 MG/DL (ref 2.7–4.5)
PHOSPHATE SERPL-MCNC: 3.1 MG/DL (ref 2.7–4.5)
PHOSPHATE SERPL-MCNC: 3.4 MG/DL (ref 2.7–4.5)
PHOSPHATE SERPL-MCNC: 3.4 MG/DL (ref 2.7–4.5)
PLATELET # BLD AUTO: 312 K/UL (ref 150–450)
PLATELET # BLD AUTO: 318 K/UL (ref 150–450)
PLATELET # BLD AUTO: 330 K/UL (ref 150–450)
PLATELET # BLD AUTO: 334 K/UL (ref 150–450)
PLATELET BLD QL SMEAR: ABNORMAL
PMV BLD AUTO: 12.4 FL (ref 9.2–12.9)
PMV BLD AUTO: 12.5 FL (ref 9.2–12.9)
PMV BLD AUTO: 12.6 FL (ref 9.2–12.9)
PMV BLD AUTO: 12.6 FL (ref 9.2–12.9)
PO2 BLDA: 102 MMHG (ref 80–100)
PO2 BLDA: 104 MMHG (ref 80–100)
PO2 BLDA: 80 MMHG (ref 80–100)
PO2 BLDA: 99 MMHG (ref 80–100)
POC BE: -1 MMOL/L
POC BE: 1 MMOL/L
POC IONIZED CALCIUM: 1.14 MMOL/L (ref 1.06–1.42)
POC IONIZED CALCIUM: 1.14 MMOL/L (ref 1.06–1.42)
POC IONIZED CALCIUM: 1.15 MMOL/L (ref 1.06–1.42)
POC IONIZED CALCIUM: 1.17 MMOL/L (ref 1.06–1.42)
POC SATURATED O2: 96 % (ref 95–100)
POC SATURATED O2: 98 % (ref 95–100)
POC TCO2: 24 MMOL/L (ref 23–27)
POC TCO2: 25 MMOL/L (ref 23–27)
POC TCO2: 25 MMOL/L (ref 23–27)
POC TCO2: 26 MMOL/L (ref 23–27)
POIKILOCYTOSIS BLD QL SMEAR: SLIGHT
POLYCHROMASIA BLD QL SMEAR: ABNORMAL
POTASSIUM BLD-SCNC: 3.5 MMOL/L (ref 3.5–5.1)
POTASSIUM BLD-SCNC: 3.6 MMOL/L (ref 3.5–5.1)
POTASSIUM SERPL-SCNC: 3.5 MMOL/L (ref 3.5–5.1)
POTASSIUM SERPL-SCNC: 3.6 MMOL/L (ref 3.5–5.1)
POTASSIUM SERPL-SCNC: 3.6 MMOL/L (ref 3.5–5.1)
POTASSIUM SERPL-SCNC: 3.8 MMOL/L (ref 3.5–5.1)
PROT SERPL-MCNC: 5.3 G/DL (ref 6–8.4)
PROT SERPL-MCNC: 6 G/DL (ref 6–8.4)
PROT SERPL-MCNC: 6.1 G/DL (ref 6–8.4)
PROT SERPL-MCNC: 6.3 G/DL (ref 6–8.4)
PROTHROMBIN TIME: 12.3 SEC (ref 9–12.5)
PROTHROMBIN TIME: 12.4 SEC (ref 9–12.5)
PROTHROMBIN TIME: 12.7 SEC (ref 9–12.5)
PROTHROMBIN TIME: 13.4 SEC (ref 9–12.5)
RBC # BLD AUTO: 2.3 M/UL (ref 4.6–6.2)
RBC # BLD AUTO: 2.54 M/UL (ref 4.6–6.2)
RBC # BLD AUTO: 2.58 M/UL (ref 4.6–6.2)
RBC # BLD AUTO: 2.65 M/UL (ref 4.6–6.2)
SAMPLE: ABNORMAL
SAMPLE: NORMAL
SCHISTOCYTES BLD QL SMEAR: ABNORMAL
SCHISTOCYTES BLD QL SMEAR: PRESENT
SITE: ABNORMAL
SITE: NORMAL
SODIUM BLD-SCNC: 143 MMOL/L (ref 136–145)
SODIUM BLD-SCNC: 144 MMOL/L (ref 136–145)
SODIUM SERPL-SCNC: 141 MMOL/L (ref 136–145)
SODIUM SERPL-SCNC: 143 MMOL/L (ref 136–145)
SPHEROCYTES BLD QL SMEAR: ABNORMAL
TARGETS BLD QL SMEAR: ABNORMAL
VANCOMYCIN SERPL-MCNC: 9.2 UG/ML
VT: 550
WBC # BLD AUTO: 27.23 K/UL (ref 3.9–12.7)
WBC # BLD AUTO: 28.14 K/UL (ref 3.9–12.7)
WBC # BLD AUTO: 28.7 K/UL (ref 3.9–12.7)
WBC # BLD AUTO: 29.88 K/UL (ref 3.9–12.7)

## 2022-07-23 PROCEDURE — 25000003 PHARM REV CODE 250: Performed by: STUDENT IN AN ORGANIZED HEALTH CARE EDUCATION/TRAINING PROGRAM

## 2022-07-23 PROCEDURE — 82803 BLOOD GASES ANY COMBINATION: CPT

## 2022-07-23 PROCEDURE — 63600175 PHARM REV CODE 636 W HCPCS: Performed by: THORACIC SURGERY (CARDIOTHORACIC VASCULAR SURGERY)

## 2022-07-23 PROCEDURE — 99233 PR SUBSEQUENT HOSPITAL CARE,LEVL III: ICD-10-PCS | Mod: ,,, | Performed by: INTERNAL MEDICINE

## 2022-07-23 PROCEDURE — 85014 HEMATOCRIT: CPT

## 2022-07-23 PROCEDURE — 83615 LACTATE (LD) (LDH) ENZYME: CPT | Performed by: STUDENT IN AN ORGANIZED HEALTH CARE EDUCATION/TRAINING PROGRAM

## 2022-07-23 PROCEDURE — 84295 ASSAY OF SERUM SODIUM: CPT

## 2022-07-23 PROCEDURE — 63600175 PHARM REV CODE 636 W HCPCS: Performed by: PHYSICIAN ASSISTANT

## 2022-07-23 PROCEDURE — 27000221 HC OXYGEN, UP TO 24 HOURS

## 2022-07-23 PROCEDURE — 80053 COMPREHEN METABOLIC PANEL: CPT | Performed by: THORACIC SURGERY (CARDIOTHORACIC VASCULAR SURGERY)

## 2022-07-23 PROCEDURE — 63600367 HC NITRIC OXIDE PER HOUR

## 2022-07-23 PROCEDURE — 93750 INTERROGATION VAD IN PERSON: CPT | Mod: ,,, | Performed by: INTERNAL MEDICINE

## 2022-07-23 PROCEDURE — 63600175 PHARM REV CODE 636 W HCPCS: Performed by: STUDENT IN AN ORGANIZED HEALTH CARE EDUCATION/TRAINING PROGRAM

## 2022-07-23 PROCEDURE — 99900035 HC TECH TIME PER 15 MIN (STAT)

## 2022-07-23 PROCEDURE — P9016 RBC LEUKOCYTES REDUCED: HCPCS | Performed by: THORACIC SURGERY (CARDIOTHORACIC VASCULAR SURGERY)

## 2022-07-23 PROCEDURE — 85730 THROMBOPLASTIN TIME PARTIAL: CPT | Mod: 91 | Performed by: THORACIC SURGERY (CARDIOTHORACIC VASCULAR SURGERY)

## 2022-07-23 PROCEDURE — 27200966 HC CLOSED SUCTION SYSTEM

## 2022-07-23 PROCEDURE — 63600175 PHARM REV CODE 636 W HCPCS

## 2022-07-23 PROCEDURE — 37799 UNLISTED PX VASCULAR SURGERY: CPT

## 2022-07-23 PROCEDURE — 25000003 PHARM REV CODE 250: Performed by: PHYSICIAN ASSISTANT

## 2022-07-23 PROCEDURE — 25000003 PHARM REV CODE 250

## 2022-07-23 PROCEDURE — 84100 ASSAY OF PHOSPHORUS: CPT | Mod: 91 | Performed by: THORACIC SURGERY (CARDIOTHORACIC VASCULAR SURGERY)

## 2022-07-23 PROCEDURE — 99291 PR CRITICAL CARE, E/M 30-74 MINUTES: ICD-10-PCS | Mod: ,,, | Performed by: INTERNAL MEDICINE

## 2022-07-23 PROCEDURE — 25000003 PHARM REV CODE 250: Performed by: THORACIC SURGERY (CARDIOTHORACIC VASCULAR SURGERY)

## 2022-07-23 PROCEDURE — 27000248 HC VAD-ADDITIONAL DAY

## 2022-07-23 PROCEDURE — 20000000 HC ICU ROOM

## 2022-07-23 PROCEDURE — 94640 AIRWAY INHALATION TREATMENT: CPT

## 2022-07-23 PROCEDURE — 83605 ASSAY OF LACTIC ACID: CPT

## 2022-07-23 PROCEDURE — 85384 FIBRINOGEN ACTIVITY: CPT | Performed by: THORACIC SURGERY (CARDIOTHORACIC VASCULAR SURGERY)

## 2022-07-23 PROCEDURE — 93750 PR INTERROGATE VENT ASSIST DEV, IN PERSON, W PHYSICIAN ANALYSIS: ICD-10-PCS | Mod: ,,, | Performed by: INTERNAL MEDICINE

## 2022-07-23 PROCEDURE — 99291 PR CRITICAL CARE, E/M 30-74 MINUTES: ICD-10-PCS | Mod: ,,, | Performed by: ANESTHESIOLOGY

## 2022-07-23 PROCEDURE — 82330 ASSAY OF CALCIUM: CPT | Mod: 91 | Performed by: THORACIC SURGERY (CARDIOTHORACIC VASCULAR SURGERY)

## 2022-07-23 PROCEDURE — 82330 ASSAY OF CALCIUM: CPT

## 2022-07-23 PROCEDURE — C9113 INJ PANTOPRAZOLE SODIUM, VIA: HCPCS

## 2022-07-23 PROCEDURE — 86901 BLOOD TYPING SEROLOGIC RH(D): CPT | Performed by: THORACIC SURGERY (CARDIOTHORACIC VASCULAR SURGERY)

## 2022-07-23 PROCEDURE — 94003 VENT MGMT INPAT SUBQ DAY: CPT

## 2022-07-23 PROCEDURE — 99231 PR SUBSEQUENT HOSPITAL CARE,LEVL I: ICD-10-PCS | Mod: ,,, | Performed by: INTERNAL MEDICINE

## 2022-07-23 PROCEDURE — 85025 COMPLETE CBC W/AUTO DIFF WBC: CPT | Mod: 91 | Performed by: THORACIC SURGERY (CARDIOTHORACIC VASCULAR SURGERY)

## 2022-07-23 PROCEDURE — 99291 CRITICAL CARE FIRST HOUR: CPT | Mod: ,,, | Performed by: INTERNAL MEDICINE

## 2022-07-23 PROCEDURE — 36430 TRANSFUSION BLD/BLD COMPNT: CPT

## 2022-07-23 PROCEDURE — 86920 COMPATIBILITY TEST SPIN: CPT

## 2022-07-23 PROCEDURE — 99900026 HC AIRWAY MAINTENANCE (STAT)

## 2022-07-23 PROCEDURE — 99231 SBSQ HOSP IP/OBS SF/LOW 25: CPT | Mod: ,,, | Performed by: INTERNAL MEDICINE

## 2022-07-23 PROCEDURE — 85610 PROTHROMBIN TIME: CPT | Mod: 91 | Performed by: THORACIC SURGERY (CARDIOTHORACIC VASCULAR SURGERY)

## 2022-07-23 PROCEDURE — 94761 N-INVAS EAR/PLS OXIMETRY MLT: CPT

## 2022-07-23 PROCEDURE — 99291 CRITICAL CARE FIRST HOUR: CPT | Mod: ,,, | Performed by: ANESTHESIOLOGY

## 2022-07-23 PROCEDURE — 99233 SBSQ HOSP IP/OBS HIGH 50: CPT | Mod: ,,, | Performed by: INTERNAL MEDICINE

## 2022-07-23 PROCEDURE — 84132 ASSAY OF SERUM POTASSIUM: CPT

## 2022-07-23 PROCEDURE — 83735 ASSAY OF MAGNESIUM: CPT | Mod: 91 | Performed by: THORACIC SURGERY (CARDIOTHORACIC VASCULAR SURGERY)

## 2022-07-23 PROCEDURE — 83050 HGB METHEMOGLOBIN QUAN: CPT

## 2022-07-23 PROCEDURE — 25000242 PHARM REV CODE 250 ALT 637 W/ HCPCS: Performed by: STUDENT IN AN ORGANIZED HEALTH CARE EDUCATION/TRAINING PROGRAM

## 2022-07-23 PROCEDURE — 80202 ASSAY OF VANCOMYCIN: CPT | Performed by: PHYSICIAN ASSISTANT

## 2022-07-23 PROCEDURE — 86920 COMPATIBILITY TEST SPIN: CPT | Performed by: THORACIC SURGERY (CARDIOTHORACIC VASCULAR SURGERY)

## 2022-07-23 RX ORDER — HYDROCODONE BITARTRATE AND ACETAMINOPHEN 500; 5 MG/1; MG/1
TABLET ORAL
Status: DISCONTINUED | OUTPATIENT
Start: 2022-07-23 | End: 2022-07-23

## 2022-07-23 RX ORDER — CALCIUM GLUCONATE 20 MG/ML
1 INJECTION, SOLUTION INTRAVENOUS ONCE
Status: COMPLETED | OUTPATIENT
Start: 2022-07-23 | End: 2022-07-23

## 2022-07-23 RX ORDER — POTASSIUM CHLORIDE 29.8 MG/ML
40 INJECTION INTRAVENOUS ONCE
Status: COMPLETED | OUTPATIENT
Start: 2022-07-23 | End: 2022-07-23

## 2022-07-23 RX ORDER — LIDOCAINE HYDROCHLORIDE ANHYDROUS AND DEXTROSE MONOHYDRATE .8; 5 G/100ML; G/100ML
0.5 INJECTION, SOLUTION INTRAVENOUS CONTINUOUS
Status: DISCONTINUED | OUTPATIENT
Start: 2022-07-23 | End: 2022-08-03

## 2022-07-23 RX ORDER — POLYETHYLENE GLYCOL 3350 17 G/17G
17 POWDER, FOR SOLUTION ORAL DAILY
Status: DISCONTINUED | OUTPATIENT
Start: 2022-07-23 | End: 2022-08-02

## 2022-07-23 RX ORDER — POTASSIUM CHLORIDE 14.9 MG/ML
20 INJECTION INTRAVENOUS ONCE
Status: COMPLETED | OUTPATIENT
Start: 2022-07-23 | End: 2022-07-23

## 2022-07-23 RX ORDER — MIDODRINE HYDROCHLORIDE 5 MG/1
15 TABLET ORAL EVERY 8 HOURS
Status: DISCONTINUED | OUTPATIENT
Start: 2022-07-23 | End: 2022-08-09

## 2022-07-23 RX ORDER — ACETAMINOPHEN 10 MG/ML
1000 INJECTION, SOLUTION INTRAVENOUS ONCE
Status: COMPLETED | OUTPATIENT
Start: 2022-07-23 | End: 2022-07-23

## 2022-07-23 RX ORDER — VANCOMYCIN HCL IN 5 % DEXTROSE 1G/250ML
1000 PLASTIC BAG, INJECTION (ML) INTRAVENOUS ONCE
Status: COMPLETED | OUTPATIENT
Start: 2022-07-23 | End: 2022-07-23

## 2022-07-23 RX ORDER — AMIODARONE HYDROCHLORIDE 200 MG/1
400 TABLET ORAL 2 TIMES DAILY
Status: DISCONTINUED | OUTPATIENT
Start: 2022-07-23 | End: 2022-07-26

## 2022-07-23 RX ADMIN — PROPOFOL 20 MCG/KG/MIN: 10 INJECTION, EMULSION INTRAVENOUS at 06:07

## 2022-07-23 RX ADMIN — HYDROMORPHONE HYDROCHLORIDE 1 MG: 1 INJECTION, SOLUTION INTRAMUSCULAR; INTRAVENOUS; SUBCUTANEOUS at 12:07

## 2022-07-23 RX ADMIN — PROPOFOL 20 MCG/KG/MIN: 10 INJECTION, EMULSION INTRAVENOUS at 11:07

## 2022-07-23 RX ADMIN — PROPOFOL 20 MCG/KG/MIN: 10 INJECTION, EMULSION INTRAVENOUS at 12:07

## 2022-07-23 RX ADMIN — DEXMEDETOMIDINE HYDROCHLORIDE 0.8 MCG/KG/HR: 4 INJECTION INTRAVENOUS at 08:07

## 2022-07-23 RX ADMIN — PANTOPRAZOLE SODIUM 40 MG: 40 INJECTION, POWDER, FOR SOLUTION INTRAVENOUS at 08:07

## 2022-07-23 RX ADMIN — AMIODARONE HYDROCHLORIDE 400 MG: 200 TABLET ORAL at 10:07

## 2022-07-23 RX ADMIN — AMIODARONE HYDROCHLORIDE 400 MG: 200 TABLET ORAL at 08:07

## 2022-07-23 RX ADMIN — VASOPRESSIN 0.04 UNITS/MIN: 20 INJECTION INTRAVENOUS at 11:07

## 2022-07-23 RX ADMIN — GUAIFENESIN 300 MG: 100 SOLUTION ORAL at 06:07

## 2022-07-23 RX ADMIN — HYDROMORPHONE HYDROCHLORIDE 1 MG: 1 INJECTION, SOLUTION INTRAMUSCULAR; INTRAVENOUS; SUBCUTANEOUS at 07:07

## 2022-07-23 RX ADMIN — VANCOMYCIN HYDROCHLORIDE 1000 MG: 1 INJECTION, POWDER, LYOPHILIZED, FOR SOLUTION INTRAVENOUS at 11:07

## 2022-07-23 RX ADMIN — POLYETHYLENE GLYCOL 3350 17 G: 17 POWDER, FOR SOLUTION ORAL at 11:07

## 2022-07-23 RX ADMIN — HYDROMORPHONE HYDROCHLORIDE 1 MG: 1 INJECTION, SOLUTION INTRAMUSCULAR; INTRAVENOUS; SUBCUTANEOUS at 10:07

## 2022-07-23 RX ADMIN — AMIODARONE HYDROCHLORIDE 0.5 MG/MIN: 1.8 INJECTION, SOLUTION INTRAVENOUS at 02:07

## 2022-07-23 RX ADMIN — HEPARIN SODIUM 1600 UNITS/HR: 5000 INJECTION INTRAVENOUS; SUBCUTANEOUS at 04:07

## 2022-07-23 RX ADMIN — ALBUTEROL SULFATE 2.5 MG: 2.5 SOLUTION RESPIRATORY (INHALATION) at 08:07

## 2022-07-23 RX ADMIN — GUAIFENESIN 300 MG: 100 SOLUTION ORAL at 11:07

## 2022-07-23 RX ADMIN — MEXILETINE HYDROCHLORIDE 250 MG: 250 CAPSULE ORAL at 01:07

## 2022-07-23 RX ADMIN — CALCIUM GLUCONATE 1 G: 20 INJECTION, SOLUTION INTRAVENOUS at 10:07

## 2022-07-23 RX ADMIN — MEROPENEM 2 G: 1 INJECTION INTRAVENOUS at 08:07

## 2022-07-23 RX ADMIN — DEXMEDETOMIDINE HYDROCHLORIDE 0.8 MCG/KG/HR: 4 INJECTION INTRAVENOUS at 03:07

## 2022-07-23 RX ADMIN — HYDROMORPHONE HYDROCHLORIDE 1 MG: 1 INJECTION, SOLUTION INTRAMUSCULAR; INTRAVENOUS; SUBCUTANEOUS at 05:07

## 2022-07-23 RX ADMIN — POTASSIUM CHLORIDE 20 MEQ: 14.9 INJECTION, SOLUTION INTRAVENOUS at 11:07

## 2022-07-23 RX ADMIN — DEXMEDETOMIDINE HYDROCHLORIDE 1.2 MCG/KG/HR: 4 INJECTION INTRAVENOUS at 11:07

## 2022-07-23 RX ADMIN — SODIUM CHLORIDE: 0.9 INJECTION, SOLUTION INTRAVENOUS at 10:07

## 2022-07-23 RX ADMIN — POTASSIUM CHLORIDE 20 MEQ: 14.9 INJECTION, SOLUTION INTRAVENOUS at 10:07

## 2022-07-23 RX ADMIN — DEXMEDETOMIDINE HYDROCHLORIDE 0.6 MCG/KG/HR: 4 INJECTION INTRAVENOUS at 02:07

## 2022-07-23 RX ADMIN — HEPARIN SODIUM 1600 UNITS/HR: 5000 INJECTION INTRAVENOUS; SUBCUTANEOUS at 09:07

## 2022-07-23 RX ADMIN — POTASSIUM CHLORIDE 40 MEQ: 29.8 INJECTION, SOLUTION INTRAVENOUS at 06:07

## 2022-07-23 RX ADMIN — MEXILETINE HYDROCHLORIDE 250 MG: 250 CAPSULE ORAL at 05:07

## 2022-07-23 RX ADMIN — MEROPENEM 2 G: 1 INJECTION INTRAVENOUS at 09:07

## 2022-07-23 RX ADMIN — MIDODRINE HYDROCHLORIDE 15 MG: 5 TABLET ORAL at 09:07

## 2022-07-23 RX ADMIN — FUROSEMIDE 0.5 MG/HR: 10 INJECTION, SOLUTION INTRAMUSCULAR; INTRAVENOUS at 10:07

## 2022-07-23 RX ADMIN — MIDODRINE HYDROCHLORIDE 15 MG: 5 TABLET ORAL at 01:07

## 2022-07-23 RX ADMIN — ACETAMINOPHEN 1000 MG: 10 INJECTION INTRAVENOUS at 06:07

## 2022-07-23 RX ADMIN — LEVOTHYROXINE SODIUM 112 MCG: 112 TABLET ORAL at 05:07

## 2022-07-23 RX ADMIN — ACETYLCYSTEINE 4 ML: 100 INHALANT RESPIRATORY (INHALATION) at 08:07

## 2022-07-23 RX ADMIN — DEXMEDETOMIDINE HYDROCHLORIDE 0.8 MCG/KG/HR: 4 INJECTION INTRAVENOUS at 06:07

## 2022-07-23 RX ADMIN — MEXILETINE HYDROCHLORIDE 250 MG: 250 CAPSULE ORAL at 09:07

## 2022-07-23 RX ADMIN — HYDROMORPHONE HYDROCHLORIDE 1 MG: 1 INJECTION, SOLUTION INTRAMUSCULAR; INTRAVENOUS; SUBCUTANEOUS at 11:07

## 2022-07-23 RX ADMIN — GUAIFENESIN 300 MG: 100 SOLUTION ORAL at 02:07

## 2022-07-23 RX ADMIN — HYDROMORPHONE HYDROCHLORIDE 1 MG: 1 INJECTION, SOLUTION INTRAMUSCULAR; INTRAVENOUS; SUBCUTANEOUS at 03:07

## 2022-07-23 RX ADMIN — PANTOPRAZOLE SODIUM 40 MG: 40 INJECTION, POWDER, FOR SOLUTION INTRAVENOUS at 09:07

## 2022-07-23 RX ADMIN — MIDODRINE HYDROCHLORIDE 10 MG: 5 TABLET ORAL at 05:07

## 2022-07-23 RX ADMIN — LIDOCAINE HYDROCHLORIDE 0.5 MG/MIN: 8 INJECTION, SOLUTION INTRAVENOUS at 10:07

## 2022-07-23 RX ADMIN — ACETYLCYSTEINE 4 ML: 100 INHALANT RESPIRATORY (INHALATION) at 02:07

## 2022-07-23 RX ADMIN — AMIODARONE HYDROCHLORIDE 0.5 MG/MIN: 1.8 INJECTION, SOLUTION INTRAVENOUS at 01:07

## 2022-07-23 RX ADMIN — ALBUTEROL SULFATE 2.5 MG: 2.5 SOLUTION RESPIRATORY (INHALATION) at 02:07

## 2022-07-23 RX ADMIN — ASPIRIN 81 MG CHEWABLE TABLET 81 MG: 81 TABLET CHEWABLE at 09:07

## 2022-07-23 NOTE — ASSESSMENT & PLAN NOTE
Tim Richards is a 56 y.o. male HFrEF with an EF of 10% and a history of HM2 LVAD in 2018 who is now s/p HM3 upgrade, initiation of V-A ECMO after failure to wean off bypass x2      Neuro/Psych:   -- Sedation: Propofol/Precedex.  -- Pain: PRN Dilaudid             Cards:   -- S/P LVAD exchange on 7/14/22  --Improving pressor requirements, wean gwen for MAP > 75, keep other pressors the same   Epi 0.04   Levo 0.04   Vaso 0.04   Giapreza 10  -- Continue Midodrine 10 mg Q8  -- Stress dose steroids complete  -- Ventricular pacing wires.  -- MAP goal 75  -- VAD  flows stable  -- Decannulated on 7/19  -- Sternal closure on 7/15  -- Bedside drive line removal on 7/23  -- EP consult due to v-tach, continue mexiletine   -- Discontinue lidocaine, continue amio ggt with addition of amio 400 BID      Pulm:   -- Goal O2 sat > 90%  -- ABG PRN  -- Chest tubes removed  -- Nitric at 10 ppm; Goal to wean to off by Monday  -- Leave on vent  -- Trach consult; tentatively planning for Thursday next week      Renal:  -- Keep sun for strict I/O  -- Trend BUN/Cr  -- Lasix ggt 0.5/hr      FEN / GI:   -- Replace lytes as needed  -- Nutrition: NPO  -- GI ppx: PPI  -- Bowel reg: miralax, docusate, go lytely      ID:   -- Tm: febrile; WBC 27 from 28  -- ABX miguel, vanc, micafungin  -- Cultures sent, repeat cultures sent 7/22, no growth to date  -- BAL GNR  -- ID consult  -- daily vanc levels  -- Line exchange 7/21      Heme/Onc:   -- H/H stable   -- Daily CBC  -- Received 18 pRBCs, 16 FFP, 2 plt, 25 cryo; 1500 albumin intra-op  -- Heparin gtt at 1600, do not titrate       Endo:   -- BG goal 140-180  -- Insulin gtt  -- Levothyroxine      PPx:   Feeding: NPO, Trickle TF today  Analgesia/Sedation: Precedex / PRN Dilaudid  Thromboembolic prevention: SCDs, heparin gtt  HOB >30: Yes  Stress Ulcer ppx: PPI  Glucose control: Critical care goal 140-180 g/dl, ISS     Lines/Drains/Airway: ETT; OG; RIJ CVC, r-radial a-line, sun; WV, VAD;  midline      Dispo/Code Status/Palliative:   -- SICU / Full Code.

## 2022-07-23 NOTE — SUBJECTIVE & OBJECTIVE
Interval History: still on multiple pressors and in critically ill setting     Review of patient's allergies indicates:   Allergen Reactions    Lisinopril Anaphylaxis    Hydralazine analogues      Chronic constipation, impotence, dizziness     Current Facility-Administered Medications   Medication Frequency    0.9%  NaCl infusion (for blood administration) Q24H PRN    0.9%  NaCl infusion PRN    0.9%  NaCl infusion PRN    acetylcysteine 100 mg/ml (10%) solution 4 mL TID WAKE    albuterol sulfate nebulizer solution 2.5 mg TID WAKE    amiodarone 360 mg/200 mL (1.8 mg/mL) infusion Continuous    amiodarone tablet 400 mg BID    angiotensin II (GIAPREZA) 2,500,000 ng in sodium chloride 0.9% 250 mL infusion Continuous    aspirin chewable tablet 81 mg Daily    dexmedetomidine (PRECEDEX) 400mcg/100mL 0.9% NaCL infusion Continuous    dextrose 10% bolus 125 mL PRN    dextrose 10% bolus 250 mL PRN    EPINEPHrine (ADRENALIN) 10 mg in dextrose 5 % 250 mL infusion Continuous    furosemide (LASIX) 200 mg in dextrose 5 % 100 mL continuous infusion (conc: 2 mg/mL) Continuous    guaiFENesin 100 mg/5 ml syrup 300 mg Q6H    heparin 25,000 units in dextrose 5% 250 mL (100 units/mL) infusion (heparin infusion - NO NOMOGRAM) Continuous    HYDROmorphone injection 0.5 mg Q4H PRN    HYDROmorphone injection 1 mg Q4H PRN    levothyroxine tablet 112 mcg Before breakfast    meropenem (MERREM) 2 g in sodium chloride 0.9% 100 mL IVPB Q12H    mexiletine capsule 250 mg Q8H    midodrine tablet 15 mg Q8H    nitric oxide gas Gas 10 ppm Continuous    NORepinephrine 8 mg in dextrose 5% 250 mL infusion Continuous    pantoprazole injection 40 mg BID    polyethylene glycol packet 17 g Daily    propofol (DIPRIVAN) 10 mg/mL infusion Continuous    vancomycin - pharmacy to dose pharmacy to manage frequency    vasopressin (PITRESSIN) 1 Units/mL in dextrose 5 % 100 mL infusion Continuous       Objective:     Vital Signs (Most Recent):  Temp: (!) 100.4 °F (38 °C)  (07/23/22 1500)  Pulse: 74 (07/23/22 1500)  Resp: (!) 32 (07/23/22 1508)  BP: (!) 75/0 (07/23/22 0315)  SpO2: 99 % (07/23/22 1456)  O2 Device (Oxygen Therapy): ventilator (07/23/22 1456)   Vital Signs (24h Range):  Temp:  [100.22 °F (37.9 °C)-101.84 °F (38.8 °C)] 100.4 °F (38 °C)  Pulse:  [] 74  Resp:  [20-32] 32  SpO2:  [97 %-99 %] 99 %  BP: (75)/(0) 75/0  Arterial Line BP: (77-93)/(57-82) 92/82     Weight: 103.4 kg (228 lb) (07/23/22 0500)  Body mass index is 30.08 kg/m².  Body surface area is 2.31 meters squared.    I/O last 3 completed shifts:  In: 7379.8 [I.V.:3950.2; NG/GT:680; IV Piggyback:2749.6]  Out: 5820 [Urine:5410; Drains:230; Other:100; Chest Tube:80]    Physical Exam    Significant Labs:  All labs within the past 24 hours have been reviewed.     Significant Imaging:  Labs: Reviewed

## 2022-07-23 NOTE — NURSING
MD Jj at the bedside 2/2 rt. groin wound vac seal not intact and increasing serous/bloody drainage from HM2 site.  Wound vac exchange performed.  MD Shae notified of Jj's request to place a stitch.

## 2022-07-23 NOTE — CONSULTS
[facility] - Surgical Intensive Care  General Surgery  Consult Note    Patient Name: Tim Richards  MRN: [id number]  Code Status: Full Code  Admission Date: 6/27/2022  Hospital Length of Stay: 25 days  Attending Physician: Yg Kaufman MD  Primary Care Provider: Deyanira Booth MD    Patient information was obtained from past medical records and primary team.     Inpatient consult to General Surgery  Consult performed by: Temitope Butts MD  Consult ordered by: Dang Amezcua MD        Subjective:     Principal Problem: Left ventricular assist device (LVAD) complication    History of Present Illness: Patient is a 56 y.o. male with history of HFrEF with an EF of 10%, he had an HM2 placed in 2018 now s/p recent surgery for HM3 upgrade complicated by need for intra-operative VA ECMO after failure to wean off bypass. General Surgery was consulted for tracheostomy creation. Patient was decannulated on 7/19. He has remained intubated and has been unable to be weaned off the vent. Currently with vent settings at 50/5. He is currently on nitric at 10 ppm. Patient with recent GNR from BAL on 7/20/22. Blood cultures with no growth to date. He has been having intermittent fevers - Tmax today was 101.5F. Patient has also had increased hypotension and is on Giapreza 30, epi 0.06, levo 0.04, and vaso 0.04. He is currently being treated with meropenem, vanc, and micafungin.      No current facility-administered medications on file prior to encounter.     Current Outpatient Medications on File Prior to Encounter   Medication Sig    allopurinoL (ZYLOPRIM) 100 MG tablet TAKE 1 TABLET (100 MG) BY MOUTH NIGHTLY.    amiodarone (PACERONE) 200 MG Tab Take 2 tablets (400 mg total) by mouth once daily.    amLODIPine (NORVASC) 10 MG tablet TAKE 1 TABLET BY MOUTH EVERY DAY    aspirin (ECOTRIN) 81 MG EC tablet Take 1 tablet (81 mg total) by mouth once daily.    busPIRone (BUSPAR) 5 MG Tab Take 1 tablet (5 mg total) by mouth 3 (three)  times daily.    levothyroxine (SYNTHROID) 112 MCG tablet Take 1 tablet (112 mcg total) by mouth before breakfast.    mexiletine (MEXITIL) 250 MG Cap Take 1 capsule (250 mg total) by mouth every 8 (eight) hours.    pantoprazole (PROTONIX) 40 MG tablet TAKE 1 TABLET (40 MG TOTAL) BY MOUTH DAILY WITH LUNCH.    warfarin (COUMADIN) 5 MG tablet TAKE 7.5 MG ORALLY DAILY EVERY SUNDAY AND THURSDAY, TAKE 5 MG ORALLY DAILY ON OTHER DAYS    [DISCONTINUED] ferrous gluconate (FERGON) 324 MG tablet TAKE 1 TABLET (324 MG TOTAL) BY MOUTH WITH LUNCH.    [DISCONTINUED] sildenafiL (VIAGRA) 100 MG tablet Take 1 tablet (100 mg total) by mouth daily as needed for Erectile Dysfunction.    [DISCONTINUED] torsemide (DEMADEX) 20 MG Tab Take 1 tablet (20 mg total) by mouth once daily.       Review of patient's allergies indicates:   Allergen Reactions    Lisinopril Anaphylaxis    Hydralazine analogues      Chronic constipation, impotence, dizziness       Past Medical History:   Diagnosis Date    Anticoagulant long-term use     CHF (congestive heart failure)     Class 1 obesity due to excess calories with serious comorbidity and body mass index (BMI) of 31.0 to 31.9 in adult     Dilated cardiomyopathy 1/10/2018    Disorder of kidney and ureter     CKD    Encounter for blood transfusion     Gout     HTN (hypertension)     Hx of psychiatric care     ICD (implantable cardioverter-defibrillator) infection 7/1/2020    Psychiatric problem     Thyroid disease     Ventricular tachycardia (paroxysmal)      Past Surgical History:   Procedure Laterality Date    AORTIC VALVULOPLASTY N/A 7/13/2022    Procedure: REPAIR, AORTIC VALVE;  Surgeon: Yg Kaufman MD;  Location: [facility] OR [address] [place] [address];  Service: Cardiovascular;  Laterality: N/A;    APPLICATION OF WOUND VACUUM-ASSISTED CLOSURE DEVICE N/A 7/15/2022    Procedure: APPLICATION, WOUND VAC;  Surgeon: Yg Kaufman MD;  Location: [facility] OR [address] [place] [address];  Service: Cardiovascular;  Laterality:  N/A;  50 x 5 cm     CARDIAC CATHETERIZATION  Dec. 2012    CARDIAC DEFIBRILLATOR PLACEMENT Left     CRRT-D    COLONOSCOPY N/A 3/6/2018    Procedure: COLONOSCOPY;  Surgeon: Alonso Bone MD;  Location: [facility] ENDO (2ND FLR);  Service: Endoscopy;  Laterality: N/A;    COLONOSCOPY N/A 7/17/2019    Procedure: COLONOSCOPY;  Surgeon: Blane Valdez MD;  Location: [facility] ENDO (2ND FLR);  Service: Endoscopy;  Laterality: N/A;    COLONOSCOPY N/A 7/18/2019    Procedure: COLONOSCOPY;  Surgeon: Blane Valdez MD;  Location: [facility] ENDO (2ND FLR);  Service: Endoscopy;  Laterality: N/A;    ESOPHAGOGASTRODUODENOSCOPY N/A 7/17/2019    Procedure: EGD (ESOPHAGOGASTRODUODENOSCOPY);  Surgeon: Blane Valdez MD;  Location: [facility] ENDO (2ND FLR);  Service: Endoscopy;  Laterality: N/A;    ESOPHAGOGASTRODUODENOSCOPY N/A 7/18/2019    Procedure: EGD (ESOPHAGOGASTRODUODENOSCOPY);  Surgeon: Blane Valdez MD;  Location: [facility] ENDO (2ND FLR);  Service: Endoscopy;  Laterality: N/A;    INSERTION OF GRAFT TO PERICARDIUM N/A 7/15/2022    Procedure: INSERTION, GRAFT, PERICARDIUM;  Surgeon: Yg Kaufman MD;  Location: [facility] OR 2ND FLR;  Service: Cardiovascular;  Laterality: N/A;    IRRIGATION OF MEDIASTINUM N/A 7/15/2022    Procedure: IRRIGATION, MEDIASTINUM;  Surgeon: Yg Kaufman MD;  Location: [facility] OR 2ND FLR;  Service: Cardiovascular;  Laterality: N/A;    LYSIS OF ADHESIONS  7/13/2022    Procedure: LYSIS, ADHESIONS;  Surgeon: Yg Kaufman MD;  Location: [facility] OR 2ND FLR;  Service: Cardiovascular;;    NONINVASIVE CARDIAC ELECTROPHYSIOLOGY STUDY N/A 10/18/2019    Procedure: CARDIAC ELECTROPHYSIOLOGY STUDY, NONINVASIVE;  Surgeon: Raz Wagner MD;  Location: [facility] EP LAB;  Service: Cardiology;  Laterality: N/A;  VT, DFTs, MDT CRTD in situ, LVAD, anes, MB, 3098    REPLACEMENT OF IMPLANTABLE CARDIOVERTER-DEFIBRILLATOR (ICD) GENERATOR N/A 3/9/2020    Procedure: REPLACEMENT, ICD GENERATOR;  Surgeon: Harry Yun MD;  Location: [facility] EP LAB;  Service:  Cardiology;  Laterality: N/A;  VT, ICD Gen Change and Lead Revision, MDT, MAC, DM,3 Prep    REPLACEMENT OF LEFT VENTRICULAR ASSIST DEVICE (LVAD)  2022    Procedure: REPLACEMENT, LVAD;  Surgeon: Yg Kaufman MD;  Location: [facility] OR [address] [place] [address];  Service: Cardiovascular;;    REPLACEMENT OF PUMP N/A 2022    Procedure: REPLACEMENT, PUMP;  Surgeon: Yg Kaufman MD;  [facility];  Service: Cardiovascular;  Laterality: N/A;  LVAD pump exchange  EXPLANATION OF HEATMATE 2  IMPLANTATION OF HEARTMATE 3  IMPLANTATION OF 8MM CHIMNEY GRAFT TO RFA  INITIATION OF ECMO  TEMPORARY CLOSURE OF CHEST    REVISION OF IMPLANTABLE CARDIOVERTER-DEFIBRILLATOR (ICD) ELECTRODE LEAD PLACEMENT N/A 3/9/2020    Procedure: REVISION, INSERTION, ELECTRODE LEAD, ICD;  Surgeon: Harry Yun MD;  Location: [facility] EP LAB;  Service: Cardiology;  Laterality: N/A;  VT, ICD Gen Change and Lead Revision, MDT, MAC, DM,3 Prep    STERNAL WOUND CLOSURE N/A 2022    Procedure: CLOSURE, WOUND, STERNUM;  Surgeon: Yg Kaufman MD;  [facility];  Service: Cardiovascular;  Laterality: N/A;  temporary closure  evacuation of hematoma    STERNAL WOUND CLOSURE N/A 7/15/2022    Procedure: CLOSURE, WOUND, STERNUM;  Surgeon: Yg Kaufman MD;  Location: [facility] OR [address] [place] [address];  Service: Cardiovascular;  Laterality: N/A;    TREATMENT OF CARDIAC ARRHYTHMIA  10/18/2019    Procedure: Cardioversion or Defibrillation;  Surgeon: Raz Wagner MD;  Location: [facility] EP LAB;  Service: Cardiology;;     Family History       Problem Relation (Age of Onset)    Cancer Sister (54)    Coronary artery disease Father    Diabetes Father    Heart disease Father    Hypertension Father    No Known Problems Mother, Brother          Tobacco Use    Smoking status: Former Smoker     Packs/day: 1.00     Years: 31.00     Pack years: 31.00     Types: Cigarettes     Quit date: 2018     Years since quittin.5    Smokeless tobacco: Never Used     Tobacco comment: pt is quiting on his own - pt stated not qualified for program; 2/16 pt  quit on his own   Substance and Sexual Activity    Alcohol use: No     Alcohol/week: 0.0 standard drinks     Comment: quit    Drug use: No    Sexual activity: Yes     Partners: Female     Birth control/protection: None     Comment: 10/5/17  with same partner 7 years      Review of Systems   Unable to perform ROS: Intubated   Objective:     Vital Signs (Most Recent):  Temp: (!) 101.48 °F (38.6 °C) (07/22/22 1915)  Pulse: 79 (07/22/22 1935)  Resp: 20 (07/22/22 1935)  BP: (!) 80/0 (07/22/22 0806)  SpO2: 96 % (07/22/22 1525)   Vital Signs (24h Range):  Temp:  [99.32 °F (37.4 °C)-101.84 °F (38.8 °C)] 101.48 °F (38.6 °C)  Pulse:  [] 79  Resp:  [20-32] 20  SpO2:  [94 %-96 %] 96 %  BP: (80)/(0) 80/0  Arterial Line BP: (65-98)/(57-86) 85/73     Weight: 98.9 kg (218 lb)  Body mass index is 28.76 kg/m².    Physical Exam  Constitutional:       General: He is not in acute distress.     Appearance: He is ill-appearing.   HENT:      Head: Normocephalic and atraumatic.   Cardiovascular:      Rate and Rhythm: Normal rate. Rhythm irregular.      Comments: LVAD in place  Pulmonary:      Comments: On vent  Vent Mode: A/C  Oxygen Concentration (%):  [50] 50  Resp Rate Total:  [19 br/min-48 br/min] 21 br/min  Vt Set:  [400 mL-550 mL] 550 mL  PEEP/CPAP:  [5 cmH20] 5 cmH20  Pressure Support:  [8 cmH20] 8 cmH20  Mean Airway Pressure:  [8.4 wcT30-21 cmH20] 20 cmH20    Abdominal:      General: There is no distension.      Palpations: Abdomen is soft.   Neurological:      General: No focal deficit present.       Significant Labs:  I have reviewed all pertinent lab results within the past 24 hours.  CBC:   Recent Labs   Lab 07/22/22  1224 07/22/22  1324 07/22/22  1934   WBC 28.94*  --   --    RBC 2.41*  --   --    HGB 6.8*  --   --    HCT 21.6*   < > 23*     --   --    MCV 90  --   --    MCH 28.2  --   --    MCHC 31.5*  --   --     <  "> = values in this interval not displayed.     CMP:   Recent Labs   Lab 07/22/22  1509   *   CALCIUM 8.8   ALBUMIN 1.7*   PROT 6.0      K 3.7   CO2 21*      BUN 49*   CREATININE 2.6*   ALKPHOS 104   ALT 35   AST 49*   BILITOT 10.9*       Significant Diagnostics:  I have reviewed all pertinent imaging results/findings within the past 24 hours.      Assessment/Plan:     Encounter for weaning from ventilator  56 y.o. male with history of HFrEF with an EF of 10%, he had an HM2 placed in 2018 now s/p recent surgery for HM3 upgrade complicated by need for intra-operative VA ECMO s/p decannulation on 7/19. Patient unable to be weaned off vent.     Tentatively plan for OR for tracheostomy creation for 7/28/22. Patient will need to be off giapreza and be hemodynamically stable.    Consent to be obtained.        VTE Risk Mitigation (From admission, onward)         Ordered     IP VTE HIGH RISK PATIENT  Once         07/21/22 1652     heparin 25,000 units in dextrose 5% 250 mL (100 units/mL) infusion (heparin infusion - NO NOMOGRAM)  Continuous        "And" Linked Group Details    07/21/22 1053     Place sequential compression device  Until discontinued         07/13/22 2040                Thank you for your consult. I will follow-up with patient. Please contact us if you have any additional questions.    Temitope Butts MD  General Surgery  [facility] - Surgical Intensive Care  "

## 2022-07-23 NOTE — ASSESSMENT & PLAN NOTE
Leukocytosis with associated fever post-decannulation. Blcx so far ngtd, resp cx unrevealing. Pt is at risk for fungal infection (prior lines, multiple surgeries). S/P HM3 with removal of old driveline. Up-escalated to meropenem due to acute decompensation.     Leukocytosis stable to slightly decreasing.   · Continue meropenem for now.  · Plan to de-escalate meropenem tomorrow as K aerogenes is susceptible.   · Continue vancomycin for now.   · Discontinue micafungin as no evidence of fungal infection.

## 2022-07-23 NOTE — PROGRESS NOTES
"[facility] - Surgical Intensive Care  Infectious Disease  Progress Note    Patient Name: Tim Richards  MRN: [id number]  Admission Date: 6/27/2022  Length of Stay: 26 days  Attending Physician: Yg Kaufman MD  Primary Care Provider: Deyanira Booth MD    Isolation Status: No active isolations  Assessment/Plan:      Shock  Likely multifactorial . On multiple pressor support.   · Management per primary.    Leukocytosis  Leukocytosis with associated fever post-decannulation. Blcx so far ngtd, resp cx unrevealing. Pt is at risk for fungal infection (prior lines, multiple surgeries). S/P HM3 with removal of old driveline. Up-escalated to meropenem due to acute decompensation.     Leukocytosis stable to slightly decreasing.   · Continue meropenem for now.  · Plan to de-escalate meropenem tomorrow as K aerogenes is susceptible.   · Continue vancomycin for now.   · Discontinue micafungin as no evidence of fungal infection.         Anticipated Disposition: per primary    Thank you for your consult. I will follow-up with patient. Please contact us if you have any additional questions.    Roxie Garg MD  Infectious Disease  [facility] - Surgical Intensive Care    Subjective:     Principal Problem:Left ventricular assist device (LVAD) complication    HPI: A 56-year-old man with HFrEF-10%, s/p VAD HM2 (March 2018), ICD, VT on amiodarone who developed malaise, anorexia, SOB, dark urine, and soft stools 3-4 days prior to admission. He was evaluated in [facility] ED at which time he tested positive for Covid-19 infection. He was admitted for further management and started on Remdesivir treatment protocol. Since admission, he feels significantly improved with bowel movements returning to normal and the shortness of breath subsiding.     Infectious Diseases consulted for "covid infection, acute. Patient with LVAD"    ID reconsulted 7/21 for "sepsis and consideration for fungemia". Since pt was last seen by ID, patient underwent " AV repair, redo sternotomy, explant of old lvad HM2 with implantation of HM3, and placed on VA-ECMO, takeback 7/14 for mediastinal washout/hematoma, and washout, sternal closure, creation of moises-pericardium (gerotex membrane) and wound vac placement on 7/15. Decannulated ECMO on 7/19 with subsequent fever and hypotension. Pt was placed on broad spectrum abx. Blcx obtained from 7/20 ngtd. S/p bronch on 7/20 - cx with normal respiratory sachin.           Interval History: ". Patient remains intubated and febrile. BAL culture on 7/20 now with K aerogenes.     Review of Systems   Unable to perform ROS: Intubated   Objective:     Vital Signs (Most Recent):  Temp: (!) 100.4 °F (38 °C) (07/23/22 1500)  Pulse: 74 (07/23/22 1500)  Resp: (!) 32 (07/23/22 1508)  BP: (!) 75/0 (07/23/22 0315)  SpO2: 99 % (07/23/22 1456)   Vital Signs (24h Range):  Temp:  [100.22 °F (37.9 °C)-101.84 °F (38.8 °C)] 100.4 °F (38 °C)  Pulse:  [] 74  Resp:  [20-32] 32  SpO2:  [97 %-99 %] 99 %  BP: (75)/(0) 75/0  Arterial Line BP: (77-93)/(57-82) 92/82     Weight: 103.4 kg (228 lb)  Body mass index is 30.08 kg/m².    Estimated Creatinine Clearance: 45.8 mL/min (A) (based on SCr of 2.3 mg/dL (H)).    Physical Exam  Vitals and nursing note reviewed. Exam conducted with a chaperone present.   Constitutional:       General: He is sleeping.      Appearance: He is ill-appearing.      Interventions: He is sedated and intubated.   HENT:      Head: Normocephalic and atraumatic.   Eyes:      General: Scleral icterus present.         Right eye: No discharge.         Left eye: No discharge.      Conjunctiva/sclera: Conjunctivae normal.   Cardiovascular:      Pulses: Normal pulses.      Comments: Distant VAD Humming sounds  Pulmonary:      Effort: Pulmonary effort is normal. No respiratory distress. He is intubated.      Breath sounds: No stridor. Rales present. No wheezing or rhonchi.   Abdominal:      General: Bowel sounds are decreased. There is no  distension.      Palpations: Abdomen is soft.      Tenderness: There is no abdominal tenderness.      Comments: Left sided DLES covered with gauze dressing soiled with yellow sanguinous secretions.    Musculoskeletal:         General: Normal range of motion.   Skin:     General: Skin is warm and dry.   Neurological:      General: No focal deficit present.      Mental Status: He is easily aroused.       Significant Labs: Blood Culture:   Recent Labs   Lab 07/20/22  1445 07/20/22  1450 07/22/22  0857 07/22/22  0918   LABBLOO No Growth to date  No Growth to date  No Growth to date  No Growth to date No Growth to date  No Growth to date  No Growth to date  No Growth to date No Growth to date  No Growth to date No Growth to date  No Growth to date     BMP:   Recent Labs   Lab 07/23/22  1048         K 3.8      CO2 23   BUN 44*   CREATININE 2.3*   CALCIUM 9.0   MG 2.7*     CBC:   Recent Labs   Lab 07/22/22  1958 07/23/22  0300 07/23/22  0402 07/23/22  0826 07/23/22  1048   WBC 28.79*  --  27.23*  --  28.70*   HGB 6.9*  --  6.6*  --  7.6*   HCT 22.2*   < > 20.5* 23* 23.7*     --  312  --  318    < > = values in this interval not displayed.     Respiratory Culture:   Recent Labs   Lab 07/20/22  1120   GSRESP Few WBC's  Rare Gram positive cocci  Rare Gram negative rods     Urine Culture: No results for input(s): LABURIN in the last 4320 hours.  Urine Studies:   Recent Labs   Lab 06/28/22  1137   COLORU Yellow   APPEARANCEUA Clear   PHUR 5.0   SPECGRAV 1.010   PROTEINUA Negative   GLUCUA Negative   KETONESU Negative   BILIRUBINUA Negative   OCCULTUA 2+*   NITRITE Negative   LEUKOCYTESUR Negative   RBCUA 0   WBCUA 2   SQUAMEPITHEL 0   HYALINECASTS 4*     Wound Culture: No results for input(s): LABAERO in the last 4320 hours.    Significant Imaging: I have reviewed all pertinent imaging results/findings within the past 24 hours.

## 2022-07-23 NOTE — ASSESSMENT & PLAN NOTE
Patient experienced an episode of VT on 6/30 and experienced an ICD shock thought to be related to hypokalemia (K of 3.1 on 6/30)  - Aggressively replete K, keep K>4 and Mg>2  - Continue mexiletine PO, lidocaine gtt, and amiodarone gtt   - EP signed off and recommending Amiodarone and Mexilitine, so would try to wean Lidocaine gtt if able  - Continue to monitor on telemetry

## 2022-07-23 NOTE — HPI
Patient is a 56 y.o. male with history of HFrEF with an EF of 10%, he had an HM2 placed in 2018 now s/p recent surgery for HM3 upgrade complicated by need for intra-operative VA ECMO after failure to wean off bypass. General Surgery was consulted for tracheostomy creation. Patient was decannulated on 7/19. He has remained intubated and has been unable to be weaned off the vent. Currently with vent settings at 50/5. He is currently on nitric at 10 ppm. Patient with recent GNR from BAL on 7/20/22. Blood cultures with no growth to date. He has been having intermittent fevers - Tmax today was 101.5F. Patient has also had increased hypotension and is on Giapreza 30, epi 0.06, levo 0.04, and vaso 0.04. He is currently being treated with meropenem, vanc, and micafungin.

## 2022-07-23 NOTE — PROGRESS NOTES
Cardiac Surgery Progress Note     Tmax 101  Slowly weaning pressors  GNRs growing from BAL     Gtts  Epi 0.05, levo 0.04, gwen 12, vaso 0.04     A/P  s/p redo LVAD     Slowly wean nitric to off by Monday  Increase midodrine, wean other pressors  Start trickle tube feeds  D/c lidocaine and start 400 PO amio BID  PAV for goal of extubation on Monday  Continue broad spectrum antibiotics      Vidhi Nicolas MD, PGY-7  Cardiothoracic Surgery   [phone]  '

## 2022-07-23 NOTE — SUBJECTIVE & OBJECTIVE
Interval History:   No acute events overnight. Intubated and able to respond appropriately and follow commands. UOP 3+L yesterday.     CVP: 7    Current drips:  Epi at 0.04  Amiodarone at 0.5  Vasopressin at 0.04  Vancomycin  Precedex at 0.8  Norepi at 0.04  Lidocaine at 0.5  Angiotension at 30  NO at 10 ppm    Pulses in the lower extremity edema are dopplerable.    Continuous Infusions:   amiodarone in dextrose 5% 0.5 mg/min (07/23/22 1312)    angiotensin II 4 ng/kg/min (07/23/22 1300)    dexmedetomidine (PRECEDEX) infusion 0.8 mcg/kg/hr (07/23/22 1300)    EPINEPHrine 0.04 mcg/kg/min (07/23/22 1300)    furosemide (LASIX) 2 mg/mL continuous infusion (non-titrating) 0.5 mg/hr (07/23/22 1300)    heparin (porcine) in 5 % dex 1,600 Units/hr (07/23/22 1300)    nitric oxide gas      NORepinephrine bitartrate-D5W 0.04 mcg/kg/min (07/23/22 1300)    propofoL 20 mcg/kg/min (07/23/22 1300)    vasopressin 0.04 Units/min (07/23/22 1300)     Scheduled Meds:   acetylcysteine 100 mg/ml (10%)  4 mL Nebulization TID WAKE    albuterol sulfate  2.5 mg Nebulization TID WAKE    amiodarone  400 mg Oral BID    aspirin  81 mg Per OG tube Daily    guaiFENesin 100 mg/5 ml  300 mg Per NG tube Q6H    levothyroxine  112 mcg Per OG tube Before breakfast    meropenem (MERREM) IVPB  2 g Intravenous Q12H    mexiletine  250 mg Per NG tube Q8H    midodrine  15 mg Per NG tube Q8H    pantoprazole  40 mg Intravenous BID    polyethylene glycol  17 g Per NG tube Daily     PRN Meds:sodium chloride, sodium chloride 0.9%, sodium chloride 0.9%, dextrose 10%, dextrose 10%, HYDROmorphone, HYDROmorphone, Pharmacy to dose Vancomycin consult **AND** vancomycin - pharmacy to dose    Review of patient's allergies indicates:   Allergen Reactions    Lisinopril Anaphylaxis    Hydralazine analogues      Chronic constipation, impotence, dizziness     Objective:     Vital Signs (Most Recent):  Temp: (!) 100.94 °F (38.3 °C) (07/23/22 1300)  Pulse: 90 (07/23/22 1300)  Resp:  (!) 21 (07/23/22 1006)  BP: (!) 75/0 (07/23/22 0315)  SpO2: 98 % (07/23/22 0315)   Vital Signs (24h Range):  Temp:  [100.22 °F (37.9 °C)-101.84 °F (38.8 °C)] 100.94 °F (38.3 °C)  Pulse:  [] 90  Resp:  [20-25] 21  SpO2:  [96 %-98 %] 98 %  BP: (75)/(0) 75/0  Arterial Line BP: (78-91)/(57-80) 86/76     Patient Vitals for the past 72 hrs (Last 3 readings):   Weight   07/23/22 0500 103.4 kg (228 lb)   07/22/22 0500 98.9 kg (218 lb)   07/21/22 1304 106.6 kg (235 lb)       Body mass index is 30.08 kg/m².      Intake/Output Summary (Last 24 hours) at 7/23/2022 1332  Last data filed at 7/23/2022 1300  Gross per 24 hour   Intake 4257.31 ml   Output 3390 ml   Net 867.31 ml         Hemodynamic Parameters:         Physical Exam  Constitutional:       General: He is not in acute distress.     Appearance: He is obese. He is ill-appearing.      Comments: Intubated   HENT:      Head: Normocephalic.      Nose: Nose normal.      Mouth/Throat:      Mouth: Mucous membranes are moist.   Eyes:      General: Scleral icterus present.   Cardiovascular:      Rate and Rhythm: Normal rate and regular rhythm.      Comments: VAD hum appreciated  Pulses detected via doppler  Pulmonary:      Comments: Coarse mechanical breath sounds bilaterally  Abdominal:      General: Bowel sounds are normal.      Palpations: Abdomen is soft.   Musculoskeletal:      Cervical back: Neck supple.      Right lower leg: No edema.      Left lower leg: No edema.   Skin:     General: Skin is warm.   Neurological:      General: No focal deficit present.       Significant Labs:  CBC:  Recent Labs   Lab 07/22/22  1958 07/23/22  0300 07/23/22  0402 07/23/22  0826 07/23/22  1048   WBC 28.79*  --  27.23*  --  28.70*   RBC 2.41*  --  2.30*  --  2.58*   HGB 6.9*  --  6.6*  --  7.6*   HCT 22.2*   < > 20.5* 23* 23.7*     --  312  --  318   MCV 92  --  89  --  92   MCH 28.6  --  28.7  --  29.5   MCHC 31.1*  --  32.2  --  32.1    < > = values in this interval not  displayed.       BNP:  Recent Labs   Lab 07/17/22 0359 07/20/22  0421 07/22/22  0317   * 813* 2,579*       CMP:  Recent Labs   Lab 07/22/22 1958 07/23/22  0402 07/23/22  1048   * 112* 110   CALCIUM 8.8 8.2* 9.0   ALBUMIN 1.7* 1.7* 1.7*   PROT 6.1 5.3* 6.0    141 143   K 3.9 3.5 3.8   CO2 22* 22* 23    107 110   BUN 47* 45* 44*   CREATININE 2.4* 2.3* 2.3*   ALKPHOS 105 102 102   ALT 34 33 31   AST 53* 49* 50*   BILITOT 10.5* 10.7* 11.0*        Coagulation:   Recent Labs   Lab 07/22/22 1958 07/23/22  0402 07/23/22  1048   INR 1.4* 1.3* 1.2   APTT 38.1* 35.6* 34.5*       LDH:  Recent Labs   Lab 07/21/22  0352 07/22/22  0317 07/23/22  0402   * 541* 479*       Microbiology:  Microbiology Results (last 7 days)       Procedure Component Value Units Date/Time    Culture, VAP (BAL) [[id number]]  (Abnormal)  (Susceptibility) Collected: 07/20/22 1120    Order Status: Completed Specimen: Bronchial Alveolar Lavage from BAL, L Updated: 07/23/22 1048     VAP BAL CULTURE KLEBSIELLA AEROGENES  Few  Normal respiratory sachin also present       Gram Stain (Respiratory) Few WBC's     Gram Stain (Respiratory) Rare Gram positive cocci     Gram Stain (Respiratory) Rare Gram negative rods    Blood culture [[id number]] Collected: 07/22/22 0857    Order Status: Completed Specimen: Blood from Peripheral, Antecubital, Right Updated: 07/23/22 1012     Blood Culture, Routine No Growth to date      No Growth to date    Blood culture [[id number]] Collected: 07/22/22 0918    Order Status: Completed Specimen: Blood from Peripheral, Hand, Left Updated: 07/23/22 1012     Blood Culture, Routine No Growth to date      No Growth to date    Blood culture [[id number]] Collected: 07/20/22 1450    Order Status: Completed Specimen: Blood from Wrist, Left Updated: 07/22/22 1612     Blood Culture, Routine No Growth to date      No Growth to date      No Growth to date    Blood culture [[id number]] Collected: 07/20/22 1441     Order Status: Completed Specimen: Blood from Peripheral, Forearm, Left Updated: 07/22/22 1612     Blood Culture, Routine No Growth to date      No Growth to date      No Growth to date    Blood culture [[id number]]     Order Status: Canceled Specimen: Blood     Blood culture [[id number]]     Order Status: Canceled Specimen: Blood             I have reviewed all pertinent labs within the past 24 hours.    Estimated Creatinine Clearance: 45.8 mL/min (A) (based on SCr of 2.3 mg/dL (H)).    Diagnostic Results:  I have reviewed and interpreted all pertinent imaging results/findings within the past 24 hours.

## 2022-07-23 NOTE — SUBJECTIVE & OBJECTIVE
Interval History/Significant Events: Continues to be persistently febrile overnight. MAPs stable on Epi 0.04, Vaso 0.04, levo 0.04, Loren 10 and nitric of 10. Dressing over driveline site changed multiple times given sanguinous discharge. Removed this morning and figure of 8 placed    Follow-up For: Procedure(s) (LRB):  REMOVAL, FOREIGN BODY (N/A)    Post-Operative Day: 1 Day Post-Op    Objective:     Vital Signs (Most Recent):  Temp: (!) 100.4 °F (38 °C) (07/23/22 0825)  Pulse: 76 (07/23/22 0825)  Resp: 20 (07/23/22 0810)  BP: (!) 75/0 (07/23/22 0315)  SpO2: 98 % (07/23/22 0315)   Vital Signs (24h Range):  Temp:  [100.04 °F (37.8 °C)-101.84 °F (38.8 °C)] 100.4 °F (38 °C)  Pulse:  [] 76  Resp:  [20-25] 20  SpO2:  [94 %-98 %] 98 %  BP: (75)/(0) 75/0  Arterial Line BP: (78-86)/(57-76) 85/74     Weight: 103.4 kg (228 lb)  Body mass index is 30.08 kg/m².      Intake/Output Summary (Last 24 hours) at 7/23/2022 0857  Last data filed at 7/23/2022 0800  Gross per 24 hour   Intake 4046.49 ml   Output 3265 ml   Net 781.49 ml       Physical Exam  Constitutional:       Comments: Intubated and sedated   HENT:      Head: Normocephalic and atraumatic.      Mouth/Throat:      Comments: OG in place  Eyes:      Pupils: Pupils are equal, round, and reactive to light.   Neck:      Comments: R IJ CVC    Cardiovascular:      Rate and Rhythm: Normal rate. Rhythm irregular.      Comments:   LVAD in place -- 6400 rpm, 5.0L    Midline sternotomy c/d with dressing in place    1 plerual and 1 mediastinal chest tubes to suction.    V pacing wires in place.   Pulmonary:      Comments: Mechanically ventilated  Abdominal:      General: There is no distension.      Palpations: Abdomen is soft.      Comments: Prior driveline site packed with iodoform gauze   Genitourinary:     Comments: Lagunas in place draining clear urine  Musculoskeletal:      Comments: ECMO cannula sites with dressing in place.     Cutdown incision with seroma with wound vac  in place. Minimal output    right radial arterial line   Skin:     General: Skin is warm and dry.   Neurological:      Comments: Following commands when sedation paused       Vents:  Vent Mode: A/C (07/23/22 0825)  Ventilator Initiated: Yes (07/15/22 1027)  Set Rate: 16 BPM (07/23/22 0825)  Vt Set: 550 mL (07/23/22 0825)  Pressure Support: 8 cmH20 (07/22/22 0821)  PEEP/CPAP: 5 cmH20 (07/23/22 0825)  Oxygen Concentration (%): 50 (07/23/22 0825)  Peak Airway Pressure: 29 cmH2O (07/23/22 0825)  Plateau Pressure: 21 cmH20 (07/23/22 0825)  Total Ve: 11.1 mL (07/23/22 0825)  Negative Inspiratory Force (cm H2O): 0 (07/23/22 0825)  F/VT Ratio<105 (RSBI): (!) 37.04 (07/22/22 1315)    Lines/Drains/Airways       Central Venous Catheter Line  Duration             Percutaneous Central Line Insertion/Assessment - Quad Lumen  07/21/22 1515 right internal jugular 1 day              Drain  Duration                  Urethral Catheter 07/13/22 0848 Temperature probe;Latex 14 Fr. 10 days         NG/OG Tube 07/15/22 1030 Left nostril 7 days              Airway  Duration                  Airway - Non-Surgical 07/13/22 0848 10 days              Arterial Line  Duration             Arterial Line 07/13/22 2000 Right Radial 9 days              Line  Duration                  VAD 07/13/22 1300 Left ventricular assist device HeartMate 3 9 days              Peripheral Intravenous Line  Duration                  Midline Catheter Insertion/Assessment  - Single Lumen 07/21/22 1616 Left basilic vein (medial side of arm) 18g x 10cm 1 day                    Significant Labs:    CBC/Anemia Profile:  Recent Labs   Lab 07/22/22  1224 07/22/22  1324 07/22/22  1958 07/23/22  0300 07/23/22  0402 07/23/22  0826   WBC 28.94*  --  28.79*  --  27.23*  --    HGB 6.8*  --  6.9*  --  6.6*  --    HCT 21.6*   < > 22.2* 21* 20.5* 23*     --  287  --  312  --    MCV 90  --  92  --  89  --    RDW 18.7*  --  18.8*  --  18.8*  --     < > = values in this interval  not displayed.        Chemistries:  Recent Labs   Lab 07/22/22  1509 07/22/22  1958 07/23/22  0402    142 141   K 3.7 3.9 3.5    109 107   CO2 21* 22* 22*   BUN 49* 47* 45*   CREATININE 2.6* 2.4* 2.3*   CALCIUM 8.8 8.8 8.2*   ALBUMIN 1.7* 1.7* 1.7*   PROT 6.0 6.1 5.3*   BILITOT 10.9* 10.5* 10.7*   ALKPHOS 104 105 102   ALT 35 34 33   AST 49* 53* 49*   MG 2.6 2.7* 2.8*   PHOS 3.4 3.5 3.4       All pertinent labs within the past 24 hours have been reviewed.    Significant Imaging:  I have reviewed all pertinent imaging results/findings within the past 24 hours.

## 2022-07-23 NOTE — PLAN OF CARE
Blood cx X2  HM II drive line removed  Weaning Benedictzia for map 75-85  Waking up following commands  3 [phone]

## 2022-07-23 NOTE — PROGRESS NOTES
[facility]  Critical Care - Surgery  Progress Note    Patient Name: Tim Richards  MRN: [id number]  Admission Date: 6/27/2022  Hospital Length of Stay: 26 days  Code Status: Full Code  Attending Provider: Yg Kaufman MD  Primary Care Provider: Deyanira Booth MD   Principal Problem: Left ventricular assist device (LVAD) complication    Subjective:     Hospital/ICU Course:  No notes on file    Interval History/Significant Events: Continues to be persistently febrile overnight. MAPs stable on Epi 0.04, Vaso 0.04, levo 0.04, Loren 10 and nitric of 10. Dressing over driveline site changed multiple times given sanguinous discharge. Removed this morning and figure of 8 placed    Follow-up For: Procedure(s) (LRB):  REMOVAL, FOREIGN BODY (N/A)    Post-Operative Day: 1 Day Post-Op    Objective:     Vital Signs (Most Recent):  Temp: (!) 100.4 °F (38 °C) (07/23/22 0825)  Pulse: 76 (07/23/22 0825)  Resp: 20 (07/23/22 0810)  BP: (!) 75/0 (07/23/22 0315)  SpO2: 98 % (07/23/22 0315)   Vital Signs (24h Range):  Temp:  [100.04 °F (37.8 °C)-101.84 °F (38.8 °C)] 100.4 °F (38 °C)  Pulse:  [] 76  Resp:  [20-25] 20  SpO2:  [94 %-98 %] 98 %  BP: (75)/(0) 75/0  Arterial Line BP: (78-86)/(57-76) 85/74     Weight: 103.4 kg (228 lb)  Body mass index is 30.08 kg/m².      Intake/Output Summary (Last 24 hours) at 7/23/2022 0857  Last data filed at 7/23/2022 0800  Gross per 24 hour   Intake 4046.49 ml   Output 3265 ml   Net 781.49 ml       Physical Exam  Constitutional:       Comments: Intubated and sedated   HENT:      Head: Normocephalic and atraumatic.      Mouth/Throat:      Comments: OG in place  Eyes:      Pupils: Pupils are equal, round, and reactive to light.   Neck:      Comments: R IJ CVC    Cardiovascular:      Rate and Rhythm: Normal rate. Rhythm irregular.      Comments:   LVAD in place -- 6400 rpm, 5.0L    Midline sternotomy c/d with dressing in place    1 plerual and 1 mediastinal chest tubes to  suction.    V pacing wires in place.   Pulmonary:      Comments: Mechanically ventilated  Abdominal:      General: There is no distension.      Palpations: Abdomen is soft.      Comments: Prior driveline site packed with iodoform gauze   Genitourinary:     Comments: Lagunas in place draining clear urine  Musculoskeletal:      Comments: ECMO cannula sites with dressing in place.     Cutdown incision with seroma with wound vac in place. Minimal output    right radial arterial line   Skin:     General: Skin is warm and dry.   Neurological:      Comments: Following commands when sedation paused       Vents:  Vent Mode: A/C (07/23/22 0825)  Ventilator Initiated: Yes (07/15/22 1027)  Set Rate: 16 BPM (07/23/22 0825)  Vt Set: 550 mL (07/23/22 0825)  Pressure Support: 8 cmH20 (07/22/22 0821)  PEEP/CPAP: 5 cmH20 (07/23/22 0825)  Oxygen Concentration (%): 50 (07/23/22 0825)  Peak Airway Pressure: 29 cmH2O (07/23/22 0825)  Plateau Pressure: 21 cmH20 (07/23/22 0825)  Total Ve: 11.1 mL (07/23/22 0825)  Negative Inspiratory Force (cm H2O): 0 (07/23/22 0825)  F/VT Ratio<105 (RSBI): (!) 37.04 (07/22/22 1315)    Lines/Drains/Airways       Central Venous Catheter Line  Duration             Percutaneous Central Line Insertion/Assessment - Quad Lumen  07/21/22 1515 right internal jugular 1 day              Drain  Duration                  Urethral Catheter 07/13/22 0848 Temperature probe;Latex 14 Fr. 10 days         NG/OG Tube 07/15/22 1030 Left nostril 7 days              Airway  Duration                  Airway - Non-Surgical 07/13/22 0848 10 days              Arterial Line  Duration             Arterial Line 07/13/22 2000 Right Radial 9 days              Line  Duration                  VAD 07/13/22 1300 Left ventricular assist device HeartMate 3 9 days              Peripheral Intravenous Line  Duration                  Midline Catheter Insertion/Assessment  - Single Lumen 07/21/22 1616 Left basilic vein (medial side of arm) 18g x 10cm  1 day                    Significant Labs:    CBC/Anemia Profile:  Recent Labs   Lab 07/22/22  1224 07/22/22  1324 07/22/22 1958 07/23/22  0300 07/23/22  0402 07/23/22  0826   WBC 28.94*  --  28.79*  --  27.23*  --    HGB 6.8*  --  6.9*  --  6.6*  --    HCT 21.6*   < > 22.2* 21* 20.5* 23*     --  287  --  312  --    MCV 90  --  92  --  89  --    RDW 18.7*  --  18.8*  --  18.8*  --     < > = values in this interval not displayed.        Chemistries:  Recent Labs   Lab 07/22/22  1509 07/22/22 1958 07/23/22  0402    142 141   K 3.7 3.9 3.5    109 107   CO2 21* 22* 22*   BUN 49* 47* 45*   CREATININE 2.6* 2.4* 2.3*   CALCIUM 8.8 8.8 8.2*   ALBUMIN 1.7* 1.7* 1.7*   PROT 6.0 6.1 5.3*   BILITOT 10.9* 10.5* 10.7*   ALKPHOS 104 105 102   ALT 35 34 33   AST 49* 53* 49*   MG 2.6 2.7* 2.8*   PHOS 3.4 3.5 3.4       All pertinent labs within the past 24 hours have been reviewed.    Significant Imaging:  I have reviewed all pertinent imaging results/findings within the past 24 hours.    Assessment/Plan:     Chronic combined systolic and diastolic heart failure  Tim Richards is a 56 y.o. male HFrEF with an EF of 10% and a history of HM2 LVAD in 2018 who is now s/p HM3 upgrade, initiation of V-A ECMO after failure to wean off bypass x2      Neuro/Psych:   -- Sedation: Propofol/Precedex.  -- Pain: PRN Dilaudid             Cards:   -- S/P LVAD exchange on 7/14/22  --Improving pressor requirements, wean gwen for MAP > 75, keep other pressors the same   Epi 0.04   Levo 0.04   Vaso 0.04   Giapreza 10  -- Continue Midodrine 10 mg Q8  -- Stress dose steroids complete  -- Ventricular pacing wires.  -- MAP goal 75  -- VAD  flows stable  -- Decannulated on 7/19  -- Sternal closure on 7/15  -- Bedside drive line removal on 7/23  -- EP consult due to v-tach, continue mexiletine   -- Discontinue lidocaine, continue amio ggt with addition of amio 400 BID      Pulm:   -- Goal O2 sat > 90%  -- ABG PRN  -- Chest tubes  removed  -- Nitric at 10 ppm; Goal to wean to off by Monday  -- Leave on vent  -- Trach consult; tentatively planning for Thursday next week      Renal:  -- Keep sun for strict I/O  -- Trend BUN/Cr  -- Lasix ggt 0.5/hr      FEN / GI:   -- Replace lytes as needed  -- Nutrition: NPO  -- GI ppx: PPI  -- Bowel reg: miralax, docusate, go lytely      ID:   -- Tm: febrile; WBC 27 from 28  -- ABX miguel, vanc, micafungin  -- Cultures sent, repeat cultures sent 7/22, no growth to date  -- BAL GNR  -- ID consult  -- daily vanc levels  -- Line exchange 7/21      Heme/Onc:   -- H/H stable   -- Daily CBC  -- Received 18 pRBCs, 16 FFP, 2 plt, 25 cryo; 1500 albumin intra-op  -- Heparin gtt at 1600, do not titrate       Endo:   -- BG goal 140-180  -- Insulin gtt  -- Levothyroxine      PPx:   Feeding: NPO, Trickle TF today  Analgesia/Sedation: Precedex / PRN Dilaudid  Thromboembolic prevention: SCDs, heparin gtt  HOB >30: Yes  Stress Ulcer ppx: PPI  Glucose control: Critical care goal 140-180 g/dl, ISS     Lines/Drains/Airway: ETT; OG; RIJ CVC, r-radial a-line, sun; WV, VAD; midline      Dispo/Code Status/Palliative:   -- SICU / Full Code.              Hardeep Biswas MD  Critical Care - Surgery  [facility]

## 2022-07-23 NOTE — ASSESSMENT & PLAN NOTE
#ADHF  #NICMP  Underwent HM III upgrade on 7/13/22, currently on VA ECMO  Currently on Epi gtt, Vasopressin, Norepi, and Angiotension II  Currently on Precedex gtt  Being treated for sepsis  Currently intubated and sedated   Chest tube removal, bronch, and old driveline removed 7/22

## 2022-07-23 NOTE — NURSING
MD Shae notified of VS, gtts, CVP, ABG, labs, VAD numbers, Marilia, vent settings, I & O, HM2 site still oozing serous fluid and blood.  Orders received and implemented to send type and screen, replete K and calcium.

## 2022-07-23 NOTE — ASSESSMENT & PLAN NOTE
The patient presented with COVID and found to have an uptrending LDH with increased power.  He underwent HM III upgrade on 7/13/22  -Daily LDH   -Continue Heparin drip    Procedure: Device Interrogation Including analysis of device parameters  Current Settings: Ventricular Assist Device    TXP LVAD INTERROGATIONS 7/23/2022 7/23/2022 7/23/2022 7/23/2022 7/23/2022 7/23/2022 7/23/2022   Type HeartMate3 HeartMate3 HeartMate3 HeartMate3 HeartMate3 HeartMate3 HeartMate3   Flow 5.3 5.3 5.4 5.5 5.6 5.5 5.4   Speed 6400 6400 6400 6400 6400 6400 6400   PI 3.5 3.6 3.6 .3.3 3.5 3.4 3.5   Power ([place]) 5.5 5.5 5.6 5.5 5.5 5.5 5.5   LSL - - 6000 - - - 6000   Low Flow Alarm - - - - - - -   Pulsatility Intermittent pulse Intermittent pulse Intermittent pulse Intermittent pulse Intermittent pulse Intermittent pulse Intermittent pulse

## 2022-07-23 NOTE — PLAN OF CARE
Following commands  Transfused 1 RBC  Suture placed to old HM II drive line site  Started on lasix gtt  Wean NO to 5 ppm

## 2022-07-23 NOTE — PROGRESS NOTES
[facility]  Heart Transplant  Progress Note    Patient Name: Tim Richards  MRN: [id number]  Admission Date: 6/27/2022  Hospital Length of Stay: 26 days  Attending Physician: Yg Kaufman MD  Primary Care Provider: Deyanira Booth MD  Principal Problem:Left ventricular assist device (LVAD) complication    Subjective:     Interval History:   No acute events overnight. Intubated and able to respond appropriately and follow commands. UOP 3+L yesterday.     CVP: 7    Current drips:  Epi at 0.04  Amiodarone at 0.5  Vasopressin at 0.04  Vancomycin  Precedex at 0.8  Norepi at 0.04  Lidocaine at 0.5  Angiotension at 30  NO at 10 ppm    Pulses in the lower extremity edema are dopplerable.    Continuous Infusions:   amiodarone in dextrose 5% 0.5 mg/min (07/23/22 1312)    angiotensin II 4 ng/kg/min (07/23/22 1300)    dexmedetomidine (PRECEDEX) infusion 0.8 mcg/kg/hr (07/23/22 1300)    EPINEPHrine 0.04 mcg/kg/min (07/23/22 1300)    furosemide (LASIX) 2 mg/mL continuous infusion (non-titrating) 0.5 mg/hr (07/23/22 1300)    heparin (porcine) in 5 % dex 1,600 Units/hr (07/23/22 1300)    nitric oxide gas      NORepinephrine bitartrate-D5W 0.04 mcg/kg/min (07/23/22 1300)    propofoL 20 mcg/kg/min (07/23/22 1300)    vasopressin 0.04 Units/min (07/23/22 1300)     Scheduled Meds:   acetylcysteine 100 mg/ml (10%)  4 mL Nebulization TID WAKE    albuterol sulfate  2.5 mg Nebulization TID WAKE    amiodarone  400 mg Oral BID    aspirin  81 mg Per OG tube Daily    guaiFENesin 100 mg/5 ml  300 mg Per NG tube Q6H    levothyroxine  112 mcg Per OG tube Before breakfast    meropenem (MERREM) IVPB  2 g Intravenous Q12H    mexiletine  250 mg Per NG tube Q8H    midodrine  15 mg Per NG tube Q8H    pantoprazole  40 mg Intravenous BID    polyethylene glycol  17 g Per NG tube Daily     PRN Meds:sodium chloride, sodium chloride 0.9%, sodium chloride 0.9%, dextrose 10%, dextrose 10%, HYDROmorphone,  HYDROmorphone, Pharmacy to dose Vancomycin consult **AND** vancomycin - pharmacy to dose    Review of patient's allergies indicates:   Allergen Reactions    Lisinopril Anaphylaxis    Hydralazine analogues      Chronic constipation, impotence, dizziness     Objective:     Vital Signs (Most Recent):  Temp: (!) 100.94 °F (38.3 °C) (07/23/22 1300)  Pulse: 90 (07/23/22 1300)  Resp: (!) 21 (07/23/22 1006)  BP: (!) 75/0 (07/23/22 0315)  SpO2: 98 % (07/23/22 0315)   Vital Signs (24h Range):  Temp:  [100.22 °F (37.9 °C)-101.84 °F (38.8 °C)] 100.94 °F (38.3 °C)  Pulse:  [] 90  Resp:  [20-25] 21  SpO2:  [96 %-98 %] 98 %  BP: (75)/(0) 75/0  Arterial Line BP: (78-91)/(57-80) 86/76     Patient Vitals for the past 72 hrs (Last 3 readings):   Weight   07/23/22 0500 103.4 kg (228 lb)   07/22/22 0500 98.9 kg (218 lb)   07/21/22 1304 106.6 kg (235 lb)       Body mass index is 30.08 kg/m².      Intake/Output Summary (Last 24 hours) at 7/23/2022 1332  Last data filed at 7/23/2022 1300  Gross per 24 hour   Intake 4257.31 ml   Output 3390 ml   Net 867.31 ml         Hemodynamic Parameters:         Physical Exam  Constitutional:       General: He is not in acute distress.     Appearance: He is obese. He is ill-appearing.      Comments: Intubated   HENT:      Head: Normocephalic.      Nose: Nose normal.      Mouth/Throat:      Mouth: Mucous membranes are moist.   Eyes:      General: Scleral icterus present.   Cardiovascular:      Rate and Rhythm: Normal rate and regular rhythm.      Comments: VAD hum appreciated  Pulses detected via doppler  Pulmonary:      Comments: Coarse mechanical breath sounds bilaterally  Abdominal:      General: Bowel sounds are normal.      Palpations: Abdomen is soft.   Musculoskeletal:      Cervical back: Neck supple.      Right lower leg: No edema.      Left lower leg: No edema.   Skin:     General: Skin is warm.   Neurological:      General: No focal deficit present.       Significant Labs:  CBC:  Recent  Labs   Lab 07/22/22 1958 07/23/22  0300 07/23/22  0402 07/23/22  0826 07/23/22  1048   WBC 28.79*  --  27.23*  --  28.70*   RBC 2.41*  --  2.30*  --  2.58*   HGB 6.9*  --  6.6*  --  7.6*   HCT 22.2*   < > 20.5* 23* 23.7*     --  312  --  318   MCV 92  --  89  --  92   MCH 28.6  --  28.7  --  29.5   MCHC 31.1*  --  32.2  --  32.1    < > = values in this interval not displayed.       BNP:  Recent Labs   Lab 07/17/22 0359 07/20/22 0421 07/22/22 0317   * 813* 2,579*       CMP:  Recent Labs   Lab 07/22/22 1958 07/23/22  0402 07/23/22  1048   * 112* 110   CALCIUM 8.8 8.2* 9.0   ALBUMIN 1.7* 1.7* 1.7*   PROT 6.1 5.3* 6.0    141 143   K 3.9 3.5 3.8   CO2 22* 22* 23    107 110   BUN 47* 45* 44*   CREATININE 2.4* 2.3* 2.3*   ALKPHOS 105 102 102   ALT 34 33 31   AST 53* 49* 50*   BILITOT 10.5* 10.7* 11.0*        Coagulation:   Recent Labs   Lab 07/22/22 1958 07/23/22  0402 07/23/22  1048   INR 1.4* 1.3* 1.2   APTT 38.1* 35.6* 34.5*       LDH:  Recent Labs   Lab 07/21/22 0352 07/22/22 0317 07/23/22  0402   * 541* 479*       Microbiology:  Microbiology Results (last 7 days)       Procedure Component Value Units Date/Time    Culture, VAP (BAL) [[id number]]  (Abnormal)  (Susceptibility) Collected: 07/20/22 1120    Order Status: Completed Specimen: Bronchial Alveolar Lavage from BAL, [facility] Updated: 07/23/22 1048     VAP BAL CULTURE KLEBSIELLA AEROGENES  Few  Normal respiratory sachin also present       Gram Stain (Respiratory) Few WBC's     Gram Stain (Respiratory) Rare Gram positive cocci     Gram Stain (Respiratory) Rare Gram negative rods    Blood culture [[id number]] Collected: 07/22/22 0857    Order Status: Completed Specimen: Blood from Peripheral, Antecubital, Right Updated: 07/23/22 1012     Blood Culture, Routine No Growth to date      No Growth to date    Blood culture [[id number]] Collected: 07/22/22 0918    Order Status: Completed Specimen: Blood from Peripheral, Hand, Left  Updated: 07/23/22 1012     Blood Culture, Routine No Growth to date      No Growth to date    Blood culture [[id number]] Collected: 07/20/22 1450    Order Status: Completed Specimen: Blood from Wrist, Left Updated: 07/22/22 1612     Blood Culture, Routine No Growth to date      No Growth to date      No Growth to date    Blood culture [[id number]] Collected: 07/20/22 1445    Order Status: Completed Specimen: Blood from Peripheral, Forearm, Left Updated: 07/22/22 1612     Blood Culture, Routine No Growth to date      No Growth to date      No Growth to date    Blood culture [[id number]]     Order Status: Canceled Specimen: Blood     Blood culture [[id number]]     Order Status: Canceled Specimen: Blood             I have reviewed all pertinent labs within the past 24 hours.    Estimated Creatinine Clearance: 45.8 mL/min (A) (based on SCr of 2.3 mg/dL (H)).    Diagnostic Results:  I have reviewed and interpreted all pertinent imaging results/findings within the past 24 hours.    Assessment and Plan:     55 Y/O M with Hx of stage D HFrEF (EF 10%) due to NICM underwent HM2 implant 3/8/18 and exchange of outflow graft 3/9/18, Hx VT/VF s/p SICD 2014 presenting with gradual worsening shortness of breath associated with nonpleuritic, nonradiating bilateral 4/10 retrosternal chest pressure pain.  Symptoms began yesterday and have gradually worsened in the last 12 hours.  He does report going to a family picnic with increased consumption of chicken wings, ribs and other salty meat products.  Prior to symptom onset, he reports nausea, nonbloody diarrhea began yesterday and single episode of nonbloody nonbilious vomiting this morning. He also complains of dizziness, lightheadedness, and a mild HA. SOB worsened this morning prompting him to come to the ED.     In the ED, patient was in respiratory distress requiring BiPAP. MAP 96 and started on Nitro gtt. Work-up revealed WBC 10, K 5.4, Cr 2.5 (baseline 1.9), BNP 1950 (baseline  200-300s), Bili 2.1, LDH 1058, and INR 3.2. Bedside TTE with severely reduced EF, AV not opening, septum bulging into RV. Given IV Lasix 80 mg x1 with only 100-200 mL UOP and dark in color. HM2 interrogated and flows have been 8.5-9 L/min with no alarms or power surges. Cardiology consulted in the ED, dicussed with HTS, and decision made to admit to ICU for further mgmt.       * Left ventricular assist device (LVAD) complication  The patient presented with COVID and found to have an uptrending LDH with increased power.  He underwent HM III upgrade on 7/13/22  -Daily LDH   -Continue Heparin drip    Procedure: Device Interrogation Including analysis of device parameters  Current Settings: Ventricular Assist Device    TXP LVAD INTERROGATIONS 7/23/2022 7/23/2022 7/23/2022 7/23/2022 7/23/2022 7/23/2022 7/23/2022   Type HeartMate3 HeartMate3 HeartMate3 HeartMate3 HeartMate3 HeartMate3 HeartMate3   Flow 5.3 5.3 5.4 5.5 5.6 5.5 5.4   Speed 6400 6400 6400 6400 6400 6400 6400   PI 3.5 3.6 3.6 .3.3 3.5 3.4 3.5   Power (Jackson) 5.5 5.5 5.6 5.5 5.5 5.5 5.5   LSL - - 6000 - - - 6000   Low Flow Alarm - - - - - - -   Pulsatility Intermittent pulse Intermittent pulse Intermittent pulse Intermittent pulse Intermittent pulse Intermittent pulse Intermittent pulse       Anxiety  -Currently intubated and sedated    History of ventricular tachycardia  Patient experienced an episode of VT on 6/30 and experienced an ICD shock thought to be related to hypokalemia (K of 3.1 on 6/30)  - Aggressively replete K, keep K>4 and Mg>2  - Continue mexiletine PO, lidocaine gtt, and amiodarone gtt   - EP signed off and recommending Amiodarone and Mexilitine, so would try to wean Lidocaine gtt if able  - Continue to monitor on telemetry    COVID-19  -Previously hypoxic then recovered  -ID was consulted, recommended Remdesivir, course completed    Elevated serum lactate dehydrogenase (LDH)  S/p HM3 exchange for HM2 pump thrombosis  Trend LDH  daily    amiodarone induced hypothyrodism  -Continue levothyroxine 112 mcg     LVAD (left ventricular assist device) present  Procedure: Device Interrogation Including analysis of device parameters  Current Settings: Ventricular Assist Device  Review of device function is stable    TXP LVAD INTERROGATIONS 7/23/2022 7/23/2022 7/23/2022 7/23/2022 7/23/2022 7/23/2022 7/23/2022   Type HeartMate3 HeartMate3 HeartMate3 HeartMate3 HeartMate3 HeartMate3 HeartMate3   Flow 5.3 5.3 5.4 5.5 5.6 5.5 5.4   Speed 6400 6400 6400 6400 6400 6400 6400   PI 3.5 3.6 3.6 .3.3 3.5 3.4 3.5   Power (Jackson) 5.5 5.5 5.6 5.5 5.5 5.5 5.5   LSL - - 6000 - - - 6000   Low Flow Alarm - - - - - - -   Pulsatility Intermittent pulse Intermittent pulse Intermittent pulse Intermittent pulse Intermittent pulse Intermittent pulse Intermittent pulse       CKD (chronic kidney disease), stage III  WAGNER on CKD  Patient's baseline is 1.5-2.0  - Nephrology is following  - Avoiding nephrotoxic medications  - Continue to monitor  - Strict I/O's    Chronic combined systolic and diastolic heart failure  #ADHF  #NICMP  Underwent HM III upgrade on 7/13/22, currently on [facility] ECMO  Currently on Epi gtt, Vasopressin, Norepi, and Angiotension II  Currently on Precedex gtt  Being treated for sepsis  Currently intubated and sedated   Chest tube removal, bronch, and old driveline removed 7/22      Case discussed with Dr Guerda Bautista MD.    Sebas Adair MD  Heart Transplant  [facility]

## 2022-07-23 NOTE — SUBJECTIVE & OBJECTIVE
No current facility-administered medications on file prior to encounter.     Current Outpatient Medications on File Prior to Encounter   Medication Sig    allopurinoL (ZYLOPRIM) 100 MG tablet TAKE 1 TABLET (100 MG) BY MOUTH NIGHTLY.    amiodarone (PACERONE) 200 MG Tab Take 2 tablets (400 mg total) by mouth once daily.    amLODIPine (NORVASC) 10 MG tablet TAKE 1 TABLET BY MOUTH EVERY DAY    aspirin (ECOTRIN) 81 MG EC tablet Take 1 tablet (81 mg total) by mouth once daily.    busPIRone (BUSPAR) 5 MG Tab Take 1 tablet (5 mg total) by mouth 3 (three) times daily.    levothyroxine (SYNTHROID) 112 MCG tablet Take 1 tablet (112 mcg total) by mouth before breakfast.    mexiletine (MEXITIL) 250 MG Cap Take 1 capsule (250 mg total) by mouth every 8 (eight) hours.    pantoprazole (PROTONIX) 40 MG tablet TAKE 1 TABLET (40 MG TOTAL) BY MOUTH DAILY WITH LUNCH.    warfarin (COUMADIN) 5 MG tablet TAKE 7.5 MG ORALLY DAILY EVERY SUNDAY AND THURSDAY, TAKE 5 MG ORALLY DAILY ON OTHER DAYS    [DISCONTINUED] ferrous gluconate (FERGON) 324 MG tablet TAKE 1 TABLET (324 MG TOTAL) BY MOUTH WITH LUNCH.    [DISCONTINUED] sildenafiL (VIAGRA) 100 MG tablet Take 1 tablet (100 mg total) by mouth daily as needed for Erectile Dysfunction.    [DISCONTINUED] torsemide (DEMADEX) 20 MG Tab Take 1 tablet (20 mg total) by mouth once daily.       Review of patient's allergies indicates:   Allergen Reactions    Lisinopril Anaphylaxis    Hydralazine analogues      Chronic constipation, impotence, dizziness       Past Medical History:   Diagnosis Date    Anticoagulant long-term use     CHF (congestive heart failure)     Class 1 obesity due to excess calories with serious comorbidity and body mass index (BMI) of 31.0 to 31.9 in adult     Dilated cardiomyopathy 1/10/2018    Disorder of kidney and ureter     CKD    Encounter for blood transfusion     Gout     HTN (hypertension)     Hx of psychiatric care     ICD (implantable cardioverter-defibrillator) infection  7/1/2020    Psychiatric problem     Thyroid disease     Ventricular tachycardia (paroxysmal)      Past Surgical History:   Procedure Laterality Date    AORTIC VALVULOPLASTY N/A 7/13/2022    Procedure: REPAIR, AORTIC VALVE;  Surgeon: Yg Kaufman MD;  Location: [facility] OR 2ND FLR;  Service: Cardiovascular;  Laterality: N/A;    APPLICATION OF WOUND VACUUM-ASSISTED CLOSURE DEVICE N/A 7/15/2022    Procedure: APPLICATION, WOUND VAC;  Surgeon: Yg Kaufman MD;  Location: [facility] OR 2ND FLR;  Service: Cardiovascular;  Laterality: N/A;  50 x 5 cm     CARDIAC CATHETERIZATION  Dec. 2012    CARDIAC DEFIBRILLATOR PLACEMENT Left     CRRT-D    COLONOSCOPY N/A 3/6/2018    Procedure: COLONOSCOPY;  Surgeon: Alonso Bone MD;  Location: [facility] ENDO (2ND FLR);  Service: Endoscopy;  Laterality: N/A;    COLONOSCOPY N/A 7/17/2019    Procedure: COLONOSCOPY;  Surgeon: Blane Valdez MD;  Location: [facility] ENDO (2ND FLR);  Service: Endoscopy;  Laterality: N/A;    COLONOSCOPY N/A 7/18/2019    Procedure: COLONOSCOPY;  Surgeon: Blane Valdez MD;  Location: [facility] ENDO (2ND FLR);  Service: Endoscopy;  Laterality: N/A;    ESOPHAGOGASTRODUODENOSCOPY N/A 7/17/2019    Procedure: EGD (ESOPHAGOGASTRODUODENOSCOPY);  Surgeon: Blane Valdez MD;  Location: [facility] ENDO (2ND FLR);  Service: Endoscopy;  Laterality: N/A;    ESOPHAGOGASTRODUODENOSCOPY N/A 7/18/2019    Procedure: EGD (ESOPHAGOGASTRODUODENOSCOPY);  Surgeon: Blane Valdez MD;  Location: [facility] ENDO (2ND FLR);  Service: Endoscopy;  Laterality: N/A;    INSERTION OF GRAFT TO PERICARDIUM N/A 7/15/2022    Procedure: INSERTION, GRAFT, PERICARDIUM;  Surgeon: Yg Kaufman MD;  Location: [facility] OR 2ND FLR;  Service: Cardiovascular;  Laterality: N/A;    IRRIGATION OF MEDIASTINUM N/A 7/15/2022    Procedure: IRRIGATION, MEDIASTINUM;  Surgeon: Yg Kaufman MD;  Location: [facility] OR 2ND FLR;  Service: Cardiovascular;  Laterality: N/A;    LYSIS OF ADHESIONS  7/13/2022    Procedure: LYSIS, ADHESIONS;  Surgeon: Yg Kaufman MD;   [address]  Service: Cardiovascular;;    NONINVASIVE CARDIAC ELECTROPHYSIOLOGY STUDY N/A 10/18/2019    Procedure: CARDIAC ELECTROPHYSIOLOGY STUDY, NONINVASIVE;  Surgeon: Raz Wagner MD;  Location: [facility] EP LAB;  Service: Cardiology;  Laterality: N/A;  VT, DFTs, MDT CRTD in situ, LVAD, anes, MB, 3098    REPLACEMENT OF IMPLANTABLE CARDIOVERTER-DEFIBRILLATOR (ICD) GENERATOR N/A 3/9/2020    Procedure: REPLACEMENT, ICD GENERATOR;  Surgeon: Harry Yun MD;  Location: [facility] EP LAB;  Service: Cardiology;  Laterality: N/A;  VT, ICD Gen Change and Lead Revision, MDT, MAC, DM,3 Prep    REPLACEMENT OF LEFT VENTRICULAR ASSIST DEVICE (LVAD)  7/13/2022    Procedure: REPLACEMENT, LVAD;  Surgeon: Yg Kaufman MD;  [facility];  Service: Cardiovascular;;    REPLACEMENT OF PUMP N/A 7/13/2022    Procedure: REPLACEMENT, PUMP;  Surgeon: Yg Kaufman MD;  Location: [address];  Service: Cardiovascular;  Laterality: N/A;  LVAD pump exchange  EXPLANATION OF HEATMATE 2  IMPLANTATION OF HEARTMATE 3  IMPLANTATION OF 8MM CHIMNEY GRAFT TO RFA  INITIATION OF ECMO  TEMPORARY CLOSURE OF CHEST    REVISION OF IMPLANTABLE CARDIOVERTER-DEFIBRILLATOR (ICD) ELECTRODE LEAD PLACEMENT N/A 3/9/2020    Procedure: REVISION, INSERTION, ELECTRODE LEAD, ICD;  Surgeon: Harry Yun MD;  Location: [facility] EP LAB;  Service: Cardiology;  Laterality: N/A;  VT, ICD Gen Change and Lead Revision, MDT, MAC, DM,3 Prep    STERNAL WOUND CLOSURE N/A 7/14/2022    Procedure: CLOSURE, WOUND, STERNUM;  Surgeon: Yg Kaufman MD;  [facility];  Service: Cardiovascular;  Laterality: N/A;  temporary closure  evacuation of hematoma    STERNAL WOUND CLOSURE N/A 7/15/2022    Procedure: CLOSURE, WOUND, STERNUM;  Surgeon: Yg Kaufman MD;  Location: [facility] OR [address] [place] [address];  Service: Cardiovascular;  Laterality: N/A;    TREATMENT OF CARDIAC ARRHYTHMIA  10/18/2019    Procedure: Cardioversion or Defibrillation;  Surgeon: Raz  MARGARET Wagner MD;  Location: [facility] EP LAB;  Service: Cardiology;;     Family History       Problem Relation (Age of Onset)    Cancer Sister (54)    Coronary artery disease Father    Diabetes Father    Heart disease Father    Hypertension Father    No Known Problems Mother, Brother          Tobacco Use    Smoking status: Former Smoker     Packs/day: 1.00     Years: 31.00     Pack years: 31.00     Types: Cigarettes     Quit date: 2018     Years since quittin.5    Smokeless tobacco: Never Used    Tobacco comment: pt is quiting on his own - pt stated not qualified for program;  pt  quit on his own   Substance and Sexual Activity    Alcohol use: No     Alcohol/week: 0.0 standard drinks     Comment: quit    Drug use: No    Sexual activity: Yes     Partners: Female     Birth control/protection: None     Comment: 10/5/17  with same partner 7 years      Review of Systems   Unable to perform ROS: Intubated   Objective:     Vital Signs (Most Recent):  Temp: (!) 101.48 °F (38.6 °C) (22)  Pulse: 79 (22)  Resp: 20 (22)  BP: (!) 80/0 (22 0806)  SpO2: 96 % (22 1525)   Vital Signs (24h Range):  Temp:  [99.32 °F (37.4 °C)-101.84 °F (38.8 °C)] 101.48 °F (38.6 °C)  Pulse:  [] 79  Resp:  [20-32] 20  SpO2:  [94 %-96 %] 96 %  BP: (80)/(0) 80/0  Arterial Line BP: (65-98)/(57-86) 85/73     Weight: 98.9 kg (218 lb)  Body mass index is 28.76 kg/m².    Physical Exam  Constitutional:       General: He is not in acute distress.     Appearance: He is ill-appearing.   HENT:      Head: Normocephalic and atraumatic.   Cardiovascular:      Rate and Rhythm: Normal rate. Rhythm irregular.      Comments: LVAD in place  Pulmonary:      Comments: On vent  Vent Mode: A/C  Oxygen Concentration (%):  [50] 50  Resp Rate Total:  [19 br/min-48 br/min] 21 br/min  Vt Set:  [400 mL-550 mL] 550 mL  PEEP/CPAP:  [5 cmH20] 5 cmH20  Pressure Support:  [8 cmH20] 8 cmH20  Mean Airway Pressure:  [8.4  fhE58-85 cmH20] 20 cmH20    Abdominal:      General: There is no distension.      Palpations: Abdomen is soft.   Neurological:      General: No focal deficit present.       Significant Labs:  I have reviewed all pertinent lab results within the past 24 hours.  CBC:   Recent Labs   Lab 07/22/22  1224 07/22/22  1324 07/22/22  1934   WBC 28.94*  --   --    RBC 2.41*  --   --    HGB 6.8*  --   --    HCT 21.6*   < > 23*     --   --    MCV 90  --   --    MCH 28.2  --   --    MCHC 31.5*  --   --     < > = values in this interval not displayed.     CMP:   Recent Labs   Lab 07/22/22  1509   *   CALCIUM 8.8   ALBUMIN 1.7*   PROT 6.0      K 3.7   CO2 21*      BUN 49*   CREATININE 2.6*   ALKPHOS 104   ALT 35   AST 49*   BILITOT 10.9*       Significant Diagnostics:  I have reviewed all pertinent imaging results/findings within the past 24 hours.

## 2022-07-23 NOTE — ASSESSMENT & PLAN NOTE
Procedure: Device Interrogation Including analysis of device parameters  Current Settings: Ventricular Assist Device  Review of device function is stable    TXP LVAD INTERROGATIONS 7/23/2022 7/23/2022 7/23/2022 7/23/2022 7/23/2022 7/23/2022 7/23/2022   Type HeartMate3 HeartMate3 HeartMate3 HeartMate3 HeartMate3 HeartMate3 HeartMate3   Flow 5.3 5.3 5.4 5.5 5.6 5.5 5.4   Speed 6400 6400 6400 6400 6400 6400 6400   PI 3.5 3.6 3.6 .3.3 3.5 3.4 3.5   Power (Jackson) 5.5 5.5 5.6 5.5 5.5 5.5 5.5   LSL - - 6000 - - - 6000   Low Flow Alarm - - - - - - -   Pulsatility Intermittent pulse Intermittent pulse Intermittent pulse Intermittent pulse Intermittent pulse Intermittent pulse Intermittent pulse

## 2022-07-23 NOTE — RESPIRATORY THERAPY
Patient tachypneic and complaining of SOB. Vent settings switched from PAV+ to AC/VC+ at previous settings with improvement in patient comfort.

## 2022-07-23 NOTE — NURSING
MD Shae notified of VS, gtts, CVP, ABG, labs, VAD numbers, Marilia, vent settings, I & O, HM2 site still oozing serous fluid and blood.  Orders received and implemented to administer PRBC x1 peripherally over 2 hours using blood warmer and to place stitch in old HM2 site.

## 2022-07-23 NOTE — ASSESSMENT & PLAN NOTE
56 y.o. male with history of HFrEF with an EF of 10%, he had an HM2 placed in 2018 now s/p recent surgery for HM3 upgrade complicated by need for intra-operative VA ECMO s/p decannulation on 7/19. Patient unable to be weaned off vent.     Tentatively plan for OR for tracheostomy creation for 7/28/22. Patient will need to be off giapreza and be hemodynamically stable.    Consent to be obtained.

## 2022-07-23 NOTE — PROGRESS NOTES
"[facility]  Nephrology  Progress Note    Patient Name: Tim Richards  MRN: [id number]  Admission Date: 6/27/2022  Hospital Length of Stay: 26 days  Attending Provider: Yg Kaufman MD   Primary Care Physician: Deyanira Booth MD  Principal Problem:Left ventricular assist device (LVAD) complication    Subjective:     HPI:   Tim Richards is a 56 y.o. male w/ Known CKD 3b (without prior nephrology f/u), NICM, end-stage HF, EF 10% s/p HM2 LVAD (2018) and ICD placement (2014), ventricular fibrillation (2020), GI bleed (2019), hypothyroidism, alcohol use (2014), nicotine dependence, and [facility] amitted on 6/27/2022 for evaluation and management of decreased appetite, decreased and dark-colored urine, and increasing shortness of breath.     History obtained from EMR and family at bedside.    In the ED, pt presented with the following VS:   Initial Vitals   BP Pulse Resp Temp SpO2   06/27/22 1129 06/27/22 1038 06/27/22 1038 06/27/22 1038 06/27/22 1810   (!) 94/0 93 20 98 °F (36.7 °C) 95 %      MAP       --                Pt was found to be in acute decompensated heart failure, hypoxic, and severely volume overloaded, for which he was started on BIPAP and on lasix gtt. On 6/30 pt had ventricular tachycardia with ICD shock, this was believed to be 2/2 hypokalemia.   His LVAD was interrogated and there was a concern of LVAD thrombosis and pt was started on Integrilin,however whilke on medical management pt continue with worsening hemolysis and so CTS was consulted and recommended upgrading to HM3 and on 7/13 pt underwent HM 2 explantation, and implantation of HM 3 LVAD. Intraoperatively pt had significant vasoplegic shock and pt was placed on ECMO as well as on vasopressors and steroids for vasodilatory shock. Pt remained intubated postoperatively.   Of note, on admission pt was found to be COVID 19 + and was treated with Remdesivir.     Nephrology was consulted for: "may soon have indication " "for dialysis"  Baseline sCr: 1.9-2.3  On admission his Cr was: 2.5    On 7/13 (day of surgery) his I/Os were as follows: 20L/4.4L, net +15.6L, urine 1965mls and 2.5L from chest tube    On 7/14 to 7/15 his I/Os were: 6.4/5.5L,net 866mls, 1.6L chest tube and 4L urine         Interval History: still on multiple pressors and in critically ill setting     Review of patient's allergies indicates:   Allergen Reactions    Lisinopril Anaphylaxis    Hydralazine analogues      Chronic constipation, impotence, dizziness     Current Facility-Administered Medications   Medication Frequency    0.9%  NaCl infusion (for blood administration) Q24H PRN    0.9%  NaCl infusion PRN    0.9%  NaCl infusion PRN    acetylcysteine 100 mg/ml (10%) solution 4 mL TID WAKE    albuterol sulfate nebulizer solution 2.5 mg TID WAKE    amiodarone 360 mg/200 mL (1.8 mg/mL) infusion Continuous    amiodarone tablet 400 mg BID    angiotensin II (GIAPREZA) 2,500,000 ng in sodium chloride 0.9% 250 mL infusion Continuous    aspirin chewable tablet 81 mg Daily    dexmedetomidine (PRECEDEX) 400mcg/100mL 0.9% NaCL infusion Continuous    dextrose 10% bolus 125 mL PRN    dextrose 10% bolus 250 mL PRN    EPINEPHrine (ADRENALIN) 10 mg in dextrose 5 % 250 mL infusion Continuous    furosemide (LASIX) 200 mg in dextrose 5 % 100 mL continuous infusion (conc: 2 mg/mL) Continuous    guaiFENesin 100 mg/5 ml syrup 300 mg Q6H    heparin 25,000 units in dextrose 5% 250 mL (100 units/mL) infusion (heparin infusion - NO NOMOGRAM) Continuous    HYDROmorphone injection 0.5 mg Q4H PRN    HYDROmorphone injection 1 mg Q4H PRN    levothyroxine tablet 112 mcg Before breakfast    meropenem (MERREM) 2 g in sodium chloride 0.9% 100 mL IVPB Q12H    mexiletine capsule 250 mg Q8H    midodrine tablet 15 mg Q8H    nitric oxide gas Gas 10 ppm Continuous    NORepinephrine 8 mg in dextrose 5% 250 mL infusion Continuous    pantoprazole injection 40 mg BID    " polyethylene glycol packet 17 g Daily    propofol (DIPRIVAN) 10 mg/mL infusion Continuous    vancomycin - pharmacy to dose pharmacy to manage frequency    vasopressin (PITRESSIN) 1 Units/mL in dextrose 5 % 100 mL infusion Continuous       Objective:     Vital Signs (Most Recent):  Temp: (!) 100.4 °F (38 °C) (07/23/22 1500)  Pulse: 74 (07/23/22 1500)  Resp: (!) 32 (07/23/22 1508)  BP: (!) 75/0 (07/23/22 0315)  SpO2: 99 % (07/23/22 1456)  O2 Device (Oxygen Therapy): ventilator (07/23/22 1456)   Vital Signs (24h Range):  Temp:  [100.22 °F (37.9 °C)-101.84 °F (38.8 °C)] 100.4 °F (38 °C)  Pulse:  [] 74  Resp:  [20-32] 32  SpO2:  [97 %-99 %] 99 %  BP: (75)/(0) 75/0  Arterial Line BP: (77-93)/(57-82) 92/82     Weight: 103.4 kg (228 lb) (07/23/22 0500)  Body mass index is 30.08 kg/m².  Body surface area is 2.31 meters squared.    I/O last 3 completed shifts:  In: 7379.8 [I.V.:3950.2; NG/GT:680; IV Piggyback:2749.6]  Out: 5820 [Urine:5410; Drains:230; Other:100; Chest Tube:80]    Physical Exam    Significant Labs:  All labs within the past 24 hours have been reviewed.     Significant Imaging:  Labs: Reviewed    Assessment/Plan:     WAGNER (acute kidney injury)  -Ischemic ATN 2/2 decrease perfusion severe hypotension in a patient with known CKD 3b  Baseline sCr: 1.9-2.3  On admission his Cr was: 2.5  Lab Results   Component Value Date    CREATININE 2.3 (H) 07/23/2022     7/15 urine microscopy showed: many granular casts, muddy brown casts, waxy casts, some WBCS, some yeast, and occasional RBCs    Recommendations:   -PRN lasix IV Push if pt continues to be net+   -corrected Ca 11 maybe d/t volume contraction   -Electrolytes:   -Acid/base:   -Fluid balance: pt is 3rd spacing as his albumin is 2   -Anemia:  -Strict I/O's and daily weights  -Renal function panel and Mg levels Q8H   -Renal ultrasound reviewed: no hydronephrosis and minimal CKD changes  -Maintain MAP >65 for renal perfusion    There is no immediate indication  for KRT at this time      LVAD (left ventricular assist device) present  -Plan per primary team       Chronic combined systolic and diastolic heart failure  -pt is net + if oxygen requirements are increasing would start with IV lasix pushes  -Plan per primary team           Thank you for your consult. I will follow-up with patient. Please contact us if you have any additional questions.    Roscoe Vazquez MD  Nephrology  [facility]

## 2022-07-23 NOTE — SUBJECTIVE & OBJECTIVE
Interval History: ". Patient remains intubated and febrile. BAL culture on 7/20 now with K aerogenes.     Review of Systems   Unable to perform ROS: Intubated   Objective:     Vital Signs (Most Recent):  Temp: (!) 100.4 °F (38 °C) (07/23/22 1500)  Pulse: 74 (07/23/22 1500)  Resp: (!) 32 (07/23/22 1508)  BP: (!) 75/0 (07/23/22 0315)  SpO2: 99 % (07/23/22 1456)   Vital Signs (24h Range):  Temp:  [100.22 °F (37.9 °C)-101.84 °F (38.8 °C)] 100.4 °F (38 °C)  Pulse:  [] 74  Resp:  [20-32] 32  SpO2:  [97 %-99 %] 99 %  BP: (75)/(0) 75/0  Arterial Line BP: (77-93)/(57-82) 92/82     Weight: 103.4 kg (228 lb)  Body mass index is 30.08 kg/m².    Estimated Creatinine Clearance: 45.8 mL/min (A) (based on SCr of 2.3 mg/dL (H)).    Physical Exam  Vitals and nursing note reviewed. Exam conducted with a chaperone present.   Constitutional:       General: He is sleeping.      Appearance: He is ill-appearing.      Interventions: He is sedated and intubated.   HENT:      Head: Normocephalic and atraumatic.   Eyes:      General: Scleral icterus present.         Right eye: No discharge.         Left eye: No discharge.      Conjunctiva/sclera: Conjunctivae normal.   Cardiovascular:      Pulses: Normal pulses.      Comments: Distant VAD Humming sounds  Pulmonary:      Effort: Pulmonary effort is normal. No respiratory distress. He is intubated.      Breath sounds: No stridor. Rales present. No wheezing or rhonchi.   Abdominal:      General: Bowel sounds are decreased. There is no distension.      Palpations: Abdomen is soft.      Tenderness: There is no abdominal tenderness.      Comments: Left sided DLES covered with gauze dressing soiled with yellow sanguinous secretions.    Musculoskeletal:         General: Normal range of motion.   Skin:     General: Skin is warm and dry.   Neurological:      General: No focal deficit present.      Mental Status: He is easily aroused.       Significant Labs: Blood Culture:   Recent Labs   Lab  07/20/22  1445 07/20/22  1450 07/22/22  0857 07/22/22  0918   LABBLOO No Growth to date  No Growth to date  No Growth to date  No Growth to date No Growth to date  No Growth to date  No Growth to date  No Growth to date No Growth to date  No Growth to date No Growth to date  No Growth to date     BMP:   Recent Labs   Lab 07/23/22  1048         K 3.8      CO2 23   BUN 44*   CREATININE 2.3*   CALCIUM 9.0   MG 2.7*     CBC:   Recent Labs   Lab 07/22/22  1958 07/23/22  0300 07/23/22  0402 07/23/22  0826 07/23/22  1048   WBC 28.79*  --  27.23*  --  28.70*   HGB 6.9*  --  6.6*  --  7.6*   HCT 22.2*   < > 20.5* 23* 23.7*     --  312  --  318    < > = values in this interval not displayed.     Respiratory Culture:   Recent Labs   Lab 07/20/22  1120   GSRESP Few WBC's  Rare Gram positive cocci  Rare Gram negative rods     Urine Culture: No results for input(s): LABURIN in the last 4320 hours.  Urine Studies:   Recent Labs   Lab 06/28/22  1137   COLORU Yellow   APPEARANCEUA Clear   PHUR 5.0   SPECGRAV 1.010   PROTEINUA Negative   GLUCUA Negative   KETONESU Negative   BILIRUBINUA Negative   OCCULTUA 2+*   NITRITE Negative   LEUKOCYTESUR Negative   RBCUA 0   WBCUA 2   SQUAMEPITHEL 0   HYALINECASTS 4*     Wound Culture: No results for input(s): LABAERO in the last 4320 hours.    Significant Imaging: I have reviewed all pertinent imaging results/findings within the past 24 hours.

## 2022-07-23 NOTE — PROGRESS NOTES
Pharmacokinetic Assessment Follow Up: IV Vancomycin    Vancomycin Regimen Assessment/Plan:     - Vancomycin random level from AM labs resulted as 9.2 mg/dl, drawn ~ 21 hours post dose.  Goal 10-20 mg/dl.   - Patient with WAGNER on CKD3.  Scr 2.3 mg/dL - trending down; continue pulse dosing.   - Administer Vancomycin 1000 mg IV x 1 dose.   - A random level is ordered with AM labs tomorrow to assess clearance.  Pharmacy to re-dose based on level and renal function.     Drug levels (last 3 results):  Recent Labs   Lab Result Units 07/21/22  0456 07/22/22  0455 07/23/22  0459   Vancomycin, Random ug/mL 11.3 6.5 9.2       Pharmacy will continue to follow and monitor vancomycin.    Please contact pharmacy at extension [phone] for questions regarding this assessment.    Thank you for the consult,   Latricia Elizabeth       Patient brief summary:  Tim Richards is a 56 y.o. male initiated on antimicrobial therapy with IV Vancomycin for treatment of sepsis    The patient's current regimen is pulse dosing    Drug Allergies:   Review of patient's allergies indicates:   Allergen Reactions    Lisinopril Anaphylaxis    Hydralazine analogues      Chronic constipation, impotence, dizziness       Actual Body Weight:   103.4 kg    Renal Function:   Estimated Creatinine Clearance: 45.8 mL/min (A) (based on SCr of 2.3 mg/dL (H)).,     Dialysis Method (if applicable):  N/A    CBC (last 72 hours):  Recent Labs   Lab Result Units 07/20/22  1130 07/20/22  1951 07/21/22  0352 07/21/22  1217 07/21/22  1918 07/22/22  0317 07/22/22  1224 07/22/22  1958 07/23/22  0402   WBC K/uL 20.15* 26.96* 32.22* 34.57* 31.19* 31.70* 28.94* 28.79* 27.23*   Hemoglobin g/dL 7.6* 7.7* 7.8* 7.9* 7.3* 7.4* 6.8* 6.9* 6.6*   Hematocrit % 24.7* 25.5* 26.0* 25.9* 23.6* 23.8* 21.6* 22.2* 20.5*   Platelets K/uL 186 200 207 226 249 265 258 287 312   Gran % % 81.9* 85.6* 86.9* 88.8* 87.7* 86.1* 88.6* 86.8* 85.0*   Lymph % % 4.8* 3.7* 3.6* 2.3* 3.1* 4.1* 2.5* 3.7* 4.0*    Mono % % 10.7 8.0 6.7 5.9 6.5 6.5 5.8 6.3 6.8   Eosinophil % % 0.1 0.1 0.1 0.1 0.1 0.1 0.1 0.1 0.1   Basophil % % 0.1 0.1 0.2 0.2 0.1 0.1 0.1 0.1 0.1   Differential Method  Automated Automated Automated Automated Automated Automated Automated Automated Automated       Metabolic Panel (last 72 hours):  Recent Labs   Lab Result Units 07/20/22  1130 07/20/22  1951 07/21/22  0352 07/21/22  1217 07/21/22  1918 07/22/22  0317 07/22/22  0858 07/22/22  1509 07/22/22  1958 07/23/22  0402   Sodium mmol/L 149* 139 149* 148* 146* 144 143 142 142 141   Potassium mmol/L 4.0 3.6 3.7 3.7 3.6 3.8 4.1 3.7 3.9 3.5   Chloride mmol/L 115* 109 113* 113* 112* 110 109 109 109 107   CO2 mmol/L 22* 23 24 25 24 22* 24 21* 22* 22*   Glucose mg/dL 123* 102 163* 146* 123* 123* 141* 112* 111* 112*   BUN mg/dL 64* 7 60* 56* 52* 53* 50* 49* 47* 45*   Creatinine mg/dL 2.4* 0.4* 2.7* 2.7* 2.4* 2.6* 2.8* 2.6* 2.4* 2.3*   Albumin g/dL 1.9* 2.6* 2.0* 1.9* 1.8* 1.8*  1.8* 1.8* 1.7* 1.7* 1.7*   Total Bilirubin mg/dL 14.5* 0.9 15.9* 14.6* 13.1* 12.9*  12.9* 13.5* 10.9* 10.5* 10.7*   Alkaline Phosphatase U/L 123 110 131 131 114 120  120 118 104 105 102   AST U/L 77* 18 72* 66* 57* 57*  57* 59* 49* 53* 49*   ALT U/L 47* 15 50* 45* 41 38  38 41 35 34 33   Magnesium mg/dL 3.0* 2.0 3.2* 3.1* 2.9* 2.9* 2.9* 2.6 2.7* 2.8*   Phosphorus mg/dL 4.3 4.4 4.4 4.0 3.6 3.8 4.1 3.4 3.5 3.4       Vancomycin Administrations:  vancomycin given in the last 96 hours                   vancomycin 750 mg in dextrose 5 % 250 mL IVPB (ready to mix system) (mg) 750 mg New Bag 07/22/22 0813    vancomycin 1.25 g in dextrose 5% 250 mL IVPB (ready to mix) (mg) 1,250 mg New Bag 07/20/22 1454                Microbiologic Results:  Microbiology Results (last 7 days)     Procedure Component Value Units Date/Time    Culture, VAP (BAL) [[id number]]  (Abnormal) Collected: 07/20/22 1120    Order Status: Completed Specimen: Bronchial Alveolar Lavage from BAL, [facility] Updated: 07/23/22 0735      VAP BAL CULTURE GRAM NEGATIVE LAURA  Few  Identification and susceptibility pending  Normal respiratory sachin also present       Gram Stain (Respiratory) Few WBC's     Gram Stain (Respiratory) Rare Gram positive cocci     Gram Stain (Respiratory) Rare Gram negative rods    Blood culture [[id number]] Collected: 07/20/22 1450    Order Status: Completed Specimen: Blood from Wrist, Left Updated: 07/22/22 1612     Blood Culture, Routine No Growth to date      No Growth to date      No Growth to date    Blood culture [[id number]] Collected: 07/20/22 1445    Order Status: Completed Specimen: Blood from Peripheral, Forearm, Left Updated: 07/22/22 1612     Blood Culture, Routine No Growth to date      No Growth to date      No Growth to date    Blood culture [[id number]] Collected: 07/22/22 0857    Order Status: Completed Specimen: Blood from Peripheral, Antecubital, Right Updated: 07/22/22 1545     Blood Culture, Routine No Growth to date    Blood culture [[id number]] Collected: 07/22/22 0918    Order Status: Completed Specimen: Blood from Peripheral, Hand, Left Updated: 07/22/22 1545     Blood Culture, Routine No Growth to date    Blood culture [[id number]]     Order Status: Canceled Specimen: Blood     Blood culture [[id number]]     Order Status: Canceled Specimen: Blood

## 2022-07-23 NOTE — ASSESSMENT & PLAN NOTE
-Ischemic ATN 2/2 decrease perfusion severe hypotension in a patient with known CKD 3b  Baseline sCr: 1.9-2.3  On admission his Cr was: 2.5  Lab Results   Component Value Date    CREATININE 2.3 (H) 07/23/2022     7/15 urine microscopy showed: many granular casts, muddy brown casts, waxy casts, some WBCS, some yeast, and occasional RBCs    Recommendations:   -PRN lasix IV Push if pt continues to be net+   -corrected Ca 11 maybe d/t volume contraction   -Electrolytes:   -Acid/base:   -Fluid balance: pt is 3rd spacing as his albumin is 2   -Anemia:  -Strict I/O's and daily weights  -Renal function panel and Mg levels Q8H   -Renal ultrasound reviewed: no hydronephrosis and minimal CKD changes  -Maintain MAP >65 for renal perfusion    There is no immediate indication for KRT at this time

## 2022-07-24 LAB
ALBUMIN SERPL BCP-MCNC: 1.7 G/DL (ref 3.5–5.2)
ALBUMIN SERPL BCP-MCNC: 1.9 G/DL (ref 3.5–5.2)
ALLENS TEST: ABNORMAL
ALLENS TEST: NORMAL
ALP SERPL-CCNC: 107 U/L (ref 55–135)
ALP SERPL-CCNC: 108 U/L (ref 55–135)
ALP SERPL-CCNC: 115 U/L (ref 55–135)
ALT SERPL W/O P-5'-P-CCNC: 35 U/L (ref 10–44)
ALT SERPL W/O P-5'-P-CCNC: 35 U/L (ref 10–44)
ALT SERPL W/O P-5'-P-CCNC: 36 U/L (ref 10–44)
ALT SERPL W/O P-5'-P-CCNC: 36 U/L (ref 10–44)
ALT SERPL W/O P-5'-P-CCNC: 38 U/L (ref 10–44)
ANION GAP SERPL CALC-SCNC: 10 MMOL/L (ref 8–16)
ANION GAP SERPL CALC-SCNC: 11 MMOL/L (ref 8–16)
ANION GAP SERPL CALC-SCNC: 7 MMOL/L (ref 8–16)
ANISOCYTOSIS BLD QL SMEAR: SLIGHT
APTT BLDCRRT: 31.9 SEC (ref 21–32)
APTT BLDCRRT: 32.7 SEC (ref 21–32)
APTT BLDCRRT: 32.8 SEC (ref 21–32)
APTT BLDCRRT: 32.8 SEC (ref 21–32)
AST SERPL-CCNC: 57 U/L (ref 10–40)
AST SERPL-CCNC: 58 U/L (ref 10–40)
AST SERPL-CCNC: 69 U/L (ref 10–40)
AST SERPL-CCNC: 69 U/L (ref 10–40)
AST SERPL-CCNC: 72 U/L (ref 10–40)
BASOPHILS # BLD AUTO: 0.03 K/UL (ref 0–0.2)
BASOPHILS # BLD AUTO: 0.04 K/UL (ref 0–0.2)
BASOPHILS # BLD AUTO: 0.05 K/UL (ref 0–0.2)
BASOPHILS # BLD AUTO: ABNORMAL K/UL (ref 0–0.2)
BASOPHILS NFR BLD: 0 % (ref 0–1.9)
BASOPHILS NFR BLD: 0.1 % (ref 0–1.9)
BASOPHILS NFR BLD: 0.1 % (ref 0–1.9)
BASOPHILS NFR BLD: 0.2 % (ref 0–1.9)
BILIRUB SERPL-MCNC: 11.2 MG/DL (ref 0.1–1)
BILIRUB SERPL-MCNC: 11.4 MG/DL (ref 0.1–1)
BILIRUB SERPL-MCNC: 11.6 MG/DL (ref 0.1–1)
BILIRUB SERPL-MCNC: 11.6 MG/DL (ref 0.1–1)
BILIRUB SERPL-MCNC: 12 MG/DL (ref 0.1–1)
BUN SERPL-MCNC: 37 MG/DL (ref 6–20)
BUN SERPL-MCNC: 37 MG/DL (ref 6–20)
BUN SERPL-MCNC: 38 MG/DL (ref 6–20)
BUN SERPL-MCNC: 38 MG/DL (ref 6–20)
BUN SERPL-MCNC: 44 MG/DL (ref 6–20)
CA-I BLDV-SCNC: 1.22 MMOL/L (ref 1.06–1.42)
CA-I BLDV-SCNC: 1.23 MMOL/L (ref 1.06–1.42)
CALCIUM SERPL-MCNC: 10.1 MG/DL (ref 8.7–10.5)
CALCIUM SERPL-MCNC: 9.1 MG/DL (ref 8.7–10.5)
CALCIUM SERPL-MCNC: 9.2 MG/DL (ref 8.7–10.5)
CALCIUM SERPL-MCNC: 9.2 MG/DL (ref 8.7–10.5)
CALCIUM SERPL-MCNC: 9.3 MG/DL (ref 8.7–10.5)
CHLORIDE SERPL-SCNC: 107 MMOL/L (ref 95–110)
CHLORIDE SERPL-SCNC: 107 MMOL/L (ref 95–110)
CHLORIDE SERPL-SCNC: 108 MMOL/L (ref 95–110)
CHLORIDE SERPL-SCNC: 109 MMOL/L (ref 95–110)
CHLORIDE SERPL-SCNC: 111 MMOL/L (ref 95–110)
CO2 SERPL-SCNC: 21 MMOL/L (ref 23–29)
CO2 SERPL-SCNC: 21 MMOL/L (ref 23–29)
CO2 SERPL-SCNC: 22 MMOL/L (ref 23–29)
CO2 SERPL-SCNC: 23 MMOL/L (ref 23–29)
CO2 SERPL-SCNC: 26 MMOL/L (ref 23–29)
CREAT SERPL-MCNC: 1.8 MG/DL (ref 0.5–1.4)
CREAT SERPL-MCNC: 1.9 MG/DL (ref 0.5–1.4)
CREAT SERPL-MCNC: 2.1 MG/DL (ref 0.5–1.4)
DELSYS: ABNORMAL
DELSYS: NORMAL
DIFFERENTIAL METHOD: ABNORMAL
EOSINOPHIL # BLD AUTO: 0.1 K/UL (ref 0–0.5)
EOSINOPHIL # BLD AUTO: ABNORMAL K/UL (ref 0–0.5)
EOSINOPHIL NFR BLD: 0 % (ref 0–8)
EOSINOPHIL NFR BLD: 0.2 % (ref 0–8)
EOSINOPHIL NFR BLD: 0.2 % (ref 0–8)
EOSINOPHIL NFR BLD: 0.3 % (ref 0–8)
ERYTHROCYTE [DISTWIDTH] IN BLOOD BY AUTOMATED COUNT: 18.6 % (ref 11.5–14.5)
ERYTHROCYTE [DISTWIDTH] IN BLOOD BY AUTOMATED COUNT: 18.8 % (ref 11.5–14.5)
ERYTHROCYTE [DISTWIDTH] IN BLOOD BY AUTOMATED COUNT: 18.8 % (ref 11.5–14.5)
ERYTHROCYTE [DISTWIDTH] IN BLOOD BY AUTOMATED COUNT: 19 % (ref 11.5–14.5)
ERYTHROCYTE [SEDIMENTATION RATE] IN BLOOD BY WESTERGREN METHOD: 16 MM/H
ERYTHROCYTE [SEDIMENTATION RATE] IN BLOOD BY WESTERGREN METHOD: 19 MM/H
ERYTHROCYTE [SEDIMENTATION RATE] IN BLOOD BY WESTERGREN METHOD: 20 MM/H
ERYTHROCYTE [SEDIMENTATION RATE] IN BLOOD BY WESTERGREN METHOD: 22 MM/H
ERYTHROCYTE [SEDIMENTATION RATE] IN BLOOD BY WESTERGREN METHOD: 22 MM/H
ERYTHROCYTE [SEDIMENTATION RATE] IN BLOOD BY WESTERGREN METHOD: 25 MM/H
EST. GFR  (AFRICAN AMERICAN): 39.8 ML/MIN/1.73 M^2
EST. GFR  (AFRICAN AMERICAN): 44.9 ML/MIN/1.73 M^2
EST. GFR  (AFRICAN AMERICAN): 47.9 ML/MIN/1.73 M^2
EST. GFR  (NON AFRICAN AMERICAN): 34.4 ML/MIN/1.73 M^2
EST. GFR  (NON AFRICAN AMERICAN): 38.8 ML/MIN/1.73 M^2
EST. GFR  (NON AFRICAN AMERICAN): 41.4 ML/MIN/1.73 M^2
FIBRINOGEN PPP-MCNC: >800 MG/DL (ref 182–400)
FIO2: 50
GLUCOSE SERPL-MCNC: 119 MG/DL (ref 70–110)
GLUCOSE SERPL-MCNC: 119 MG/DL (ref 70–110)
GLUCOSE SERPL-MCNC: 127 MG/DL (ref 70–110)
GLUCOSE SERPL-MCNC: 127 MG/DL (ref 70–110)
GLUCOSE SERPL-MCNC: 143 MG/DL (ref 70–110)
HCO3 UR-SCNC: 17.7 MMOL/L (ref 24–28)
HCO3 UR-SCNC: 21.2 MMOL/L (ref 24–28)
HCO3 UR-SCNC: 21.7 MMOL/L (ref 24–28)
HCO3 UR-SCNC: 22.6 MMOL/L (ref 24–28)
HCO3 UR-SCNC: 23.9 MMOL/L (ref 24–28)
HCO3 UR-SCNC: 27 MMOL/L (ref 24–28)
HCT VFR BLD AUTO: 22.8 % (ref 40–54)
HCT VFR BLD AUTO: 23.1 % (ref 40–54)
HCT VFR BLD AUTO: 23.5 % (ref 40–54)
HCT VFR BLD AUTO: 24.6 % (ref 40–54)
HCT VFR BLD CALC: 20 %PCV (ref 36–54)
HCT VFR BLD CALC: 22 %PCV (ref 36–54)
HCT VFR BLD CALC: 24 %PCV (ref 36–54)
HCT VFR BLD CALC: 29 %PCV (ref 36–54)
HCT VFR BLD CALC: 41 %PCV (ref 36–54)
HGB BLD-MCNC: 7.3 G/DL (ref 14–18)
HGB BLD-MCNC: 7.3 G/DL (ref 14–18)
HGB BLD-MCNC: 7.4 G/DL (ref 14–18)
HGB BLD-MCNC: 7.6 G/DL (ref 14–18)
HYPOCHROMIA BLD QL SMEAR: ABNORMAL
HYPOCHROMIA BLD QL SMEAR: ABNORMAL
IMM GRANULOCYTES # BLD AUTO: 0.83 K/UL (ref 0–0.04)
IMM GRANULOCYTES # BLD AUTO: 0.85 K/UL (ref 0–0.04)
IMM GRANULOCYTES # BLD AUTO: 0.96 K/UL (ref 0–0.04)
IMM GRANULOCYTES # BLD AUTO: ABNORMAL K/UL (ref 0–0.04)
IMM GRANULOCYTES NFR BLD AUTO: 3 % (ref 0–0.5)
IMM GRANULOCYTES NFR BLD AUTO: 3.2 % (ref 0–0.5)
IMM GRANULOCYTES NFR BLD AUTO: 3.3 % (ref 0–0.5)
IMM GRANULOCYTES NFR BLD AUTO: ABNORMAL % (ref 0–0.5)
INR PPP: 1.1 (ref 0.8–1.2)
INR PPP: 1.2 (ref 0.8–1.2)
LDH SERPL L TO P-CCNC: 0.51 MMOL/L (ref 0.36–1.25)
LDH SERPL L TO P-CCNC: 0.65 MMOL/L (ref 0.36–1.25)
LDH SERPL L TO P-CCNC: 0.7 MMOL/L (ref 0.36–1.25)
LDH SERPL L TO P-CCNC: 0.7 MMOL/L (ref 0.36–1.25)
LDH SERPL L TO P-CCNC: 0.74 MMOL/L (ref 0.36–1.25)
LDH SERPL L TO P-CCNC: 566 U/L (ref 110–260)
LYMPHOCYTES # BLD AUTO: 0.8 K/UL (ref 1–4.8)
LYMPHOCYTES # BLD AUTO: 0.8 K/UL (ref 1–4.8)
LYMPHOCYTES # BLD AUTO: 0.9 K/UL (ref 1–4.8)
LYMPHOCYTES # BLD AUTO: ABNORMAL K/UL (ref 1–4.8)
LYMPHOCYTES NFR BLD: 2.9 % (ref 18–48)
LYMPHOCYTES NFR BLD: 3.1 % (ref 18–48)
LYMPHOCYTES NFR BLD: 3.2 % (ref 18–48)
LYMPHOCYTES NFR BLD: 7 % (ref 18–48)
MAGNESIUM SERPL-MCNC: 2.5 MG/DL (ref 1.6–2.6)
MAGNESIUM SERPL-MCNC: 2.6 MG/DL (ref 1.6–2.6)
MAGNESIUM SERPL-MCNC: 2.8 MG/DL (ref 1.6–2.6)
MCH RBC QN AUTO: 28.4 PG (ref 27–31)
MCH RBC QN AUTO: 29 PG (ref 27–31)
MCH RBC QN AUTO: 29.2 PG (ref 27–31)
MCH RBC QN AUTO: 29.5 PG (ref 27–31)
MCHC RBC AUTO-ENTMCNC: 30.9 G/DL (ref 32–36)
MCHC RBC AUTO-ENTMCNC: 31.1 G/DL (ref 32–36)
MCHC RBC AUTO-ENTMCNC: 32 G/DL (ref 32–36)
MCHC RBC AUTO-ENTMCNC: 32 G/DL (ref 32–36)
MCV RBC AUTO: 91 FL (ref 82–98)
MCV RBC AUTO: 91 FL (ref 82–98)
MCV RBC AUTO: 92 FL (ref 82–98)
MCV RBC AUTO: 94 FL (ref 82–98)
METAMYELOCYTES NFR BLD MANUAL: 1 %
METHEMOGLOBIN: 0.9 % (ref 0–3)
MIN VOL: 4.8
MIN VOL: 6.8
MIN VOL: 7.1
MIN VOL: 8.2
MODE: ABNORMAL
MODE: NORMAL
MONOCYTES # BLD AUTO: 1.7 K/UL (ref 0.3–1)
MONOCYTES # BLD AUTO: 1.8 K/UL (ref 0.3–1)
MONOCYTES # BLD AUTO: 1.9 K/UL (ref 0.3–1)
MONOCYTES # BLD AUTO: ABNORMAL K/UL (ref 0.3–1)
MONOCYTES NFR BLD: 3 % (ref 4–15)
MONOCYTES NFR BLD: 6.3 % (ref 4–15)
MONOCYTES NFR BLD: 6.3 % (ref 4–15)
MONOCYTES NFR BLD: 6.5 % (ref 4–15)
MYELOCYTES NFR BLD MANUAL: 2 %
NEUTROPHILS # BLD AUTO: 23.4 K/UL (ref 1.8–7.7)
NEUTROPHILS # BLD AUTO: 24.5 K/UL (ref 1.8–7.7)
NEUTROPHILS # BLD AUTO: 25.6 K/UL (ref 1.8–7.7)
NEUTROPHILS NFR BLD: 86.9 % (ref 38–73)
NEUTROPHILS NFR BLD: 87 % (ref 38–73)
NEUTROPHILS NFR BLD: 87 % (ref 38–73)
NEUTROPHILS NFR BLD: 87.2 % (ref 38–73)
NRBC BLD-RTO: 0 /100 WBC
OVALOCYTES BLD QL SMEAR: ABNORMAL
OVALOCYTES BLD QL SMEAR: ABNORMAL
PCO2 BLDA: 32.3 MMHG (ref 35–45)
PCO2 BLDA: 32.4 MMHG (ref 35–45)
PCO2 BLDA: 34.7 MMHG (ref 35–45)
PCO2 BLDA: 36.5 MMHG (ref 35–45)
PCO2 BLDA: 41.7 MMHG (ref 35–45)
PCO2 BLDA: 47.4 MMHG (ref 35–45)
PEEP: 5
PH SMN: 7.34 [PH] (ref 7.35–7.45)
PH SMN: 7.36 [PH] (ref 7.35–7.45)
PH SMN: 7.37 [PH] (ref 7.35–7.45)
PH SMN: 7.37 [PH] (ref 7.35–7.45)
PH SMN: 7.42 [PH] (ref 7.35–7.45)
PH SMN: 7.44 [PH] (ref 7.35–7.45)
PHOSPHATE SERPL-MCNC: 2.9 MG/DL (ref 2.7–4.5)
PHOSPHATE SERPL-MCNC: 2.9 MG/DL (ref 2.7–4.5)
PHOSPHATE SERPL-MCNC: 3.2 MG/DL (ref 2.7–4.5)
PIP: 21
PIP: 21
PIP: 23
PIP: 25
PIP: 25
PLATELET # BLD AUTO: 332 K/UL (ref 150–450)
PLATELET # BLD AUTO: 348 K/UL (ref 150–450)
PLATELET # BLD AUTO: 349 K/UL (ref 150–450)
PLATELET # BLD AUTO: 364 K/UL (ref 150–450)
PLATELET BLD QL SMEAR: ABNORMAL
PMV BLD AUTO: 12.2 FL (ref 9.2–12.9)
PMV BLD AUTO: 12.2 FL (ref 9.2–12.9)
PMV BLD AUTO: 12.3 FL (ref 9.2–12.9)
PMV BLD AUTO: 12.7 FL (ref 9.2–12.9)
PO2 BLDA: 122 MMHG (ref 80–100)
PO2 BLDA: 130 MMHG (ref 80–100)
PO2 BLDA: 131 MMHG (ref 80–100)
PO2 BLDA: 135 MMHG (ref 80–100)
PO2 BLDA: 137 MMHG (ref 80–100)
PO2 BLDA: 139 MMHG (ref 80–100)
POC BE: -1 MMOL/L
POC BE: -2 MMOL/L
POC BE: -2 MMOL/L
POC BE: -4 MMOL/L
POC BE: -8 MMOL/L
POC BE: 2 MMOL/L
POC IONIZED CALCIUM: 1.1 MMOL/L (ref 1.06–1.42)
POC IONIZED CALCIUM: 1.14 MMOL/L (ref 1.06–1.42)
POC IONIZED CALCIUM: 1.15 MMOL/L (ref 1.06–1.42)
POC IONIZED CALCIUM: 1.18 MMOL/L (ref 1.06–1.42)
POC IONIZED CALCIUM: 1.21 MMOL/L (ref 1.06–1.42)
POC PCO2 TEMP: 32.4 MMHG
POC PCO2 TEMP: 36.5 MMHG
POC PCO2 TEMP: 47.4 MMHG
POC PH TEMP: 7.34
POC PH TEMP: 7.36
POC PH TEMP: 7.37
POC PO2 TEMP: 131 MMHG
POC PO2 TEMP: 135 MMHG
POC PO2 TEMP: 137 MMHG
POC SATURATED O2: 99 % (ref 95–100)
POC TCO2: 19 MMOL/L (ref 23–27)
POC TCO2: 22 MMOL/L (ref 23–27)
POC TCO2: 23 MMOL/L (ref 23–27)
POC TCO2: 24 MMOL/L (ref 23–27)
POC TCO2: 25 MMOL/L (ref 23–27)
POC TCO2: 28 MMOL/L (ref 23–27)
POC TEMPERATURE: ABNORMAL
POC TEMPERATURE: NORMAL
POC TEMPERATURE: NORMAL
POIKILOCYTOSIS BLD QL SMEAR: SLIGHT
POIKILOCYTOSIS BLD QL SMEAR: SLIGHT
POLYCHROMASIA BLD QL SMEAR: ABNORMAL
POTASSIUM BLD-SCNC: 3.1 MMOL/L (ref 3.5–5.1)
POTASSIUM BLD-SCNC: 3.3 MMOL/L (ref 3.5–5.1)
POTASSIUM BLD-SCNC: 3.4 MMOL/L (ref 3.5–5.1)
POTASSIUM BLD-SCNC: 3.6 MMOL/L (ref 3.5–5.1)
POTASSIUM BLD-SCNC: 3.8 MMOL/L (ref 3.5–5.1)
POTASSIUM SERPL-SCNC: 3.6 MMOL/L (ref 3.5–5.1)
POTASSIUM SERPL-SCNC: 3.7 MMOL/L (ref 3.5–5.1)
POTASSIUM SERPL-SCNC: 3.9 MMOL/L (ref 3.5–5.1)
POTASSIUM SERPL-SCNC: 3.9 MMOL/L (ref 3.5–5.1)
POTASSIUM SERPL-SCNC: 4.2 MMOL/L (ref 3.5–5.1)
PROT SERPL-MCNC: 6.4 G/DL (ref 6–8.4)
PROT SERPL-MCNC: 6.8 G/DL (ref 6–8.4)
PROTHROMBIN TIME: 11.4 SEC (ref 9–12.5)
PROTHROMBIN TIME: 11.7 SEC (ref 9–12.5)
PROTHROMBIN TIME: 11.7 SEC (ref 9–12.5)
PROTHROMBIN TIME: 11.9 SEC (ref 9–12.5)
RBC # BLD AUTO: 2.5 M/UL (ref 4.6–6.2)
RBC # BLD AUTO: 2.51 M/UL (ref 4.6–6.2)
RBC # BLD AUTO: 2.57 M/UL (ref 4.6–6.2)
RBC # BLD AUTO: 2.62 M/UL (ref 4.6–6.2)
SAMPLE: ABNORMAL
SAMPLE: NORMAL
SITE: ABNORMAL
SITE: NORMAL
SODIUM BLD-SCNC: 142 MMOL/L (ref 136–145)
SODIUM BLD-SCNC: 143 MMOL/L (ref 136–145)
SODIUM BLD-SCNC: 144 MMOL/L (ref 136–145)
SODIUM BLD-SCNC: 144 MMOL/L (ref 136–145)
SODIUM BLD-SCNC: 145 MMOL/L (ref 136–145)
SODIUM SERPL-SCNC: 139 MMOL/L (ref 136–145)
SODIUM SERPL-SCNC: 139 MMOL/L (ref 136–145)
SODIUM SERPL-SCNC: 141 MMOL/L (ref 136–145)
SODIUM SERPL-SCNC: 142 MMOL/L (ref 136–145)
SODIUM SERPL-SCNC: 144 MMOL/L (ref 136–145)
SP02: 100
SPHEROCYTES BLD QL SMEAR: ABNORMAL
TARGETS BLD QL SMEAR: ABNORMAL
TARGETS BLD QL SMEAR: ABNORMAL
TIME NOTIFIED: 2035
TIME NOTIFIED: 2119
TIME NOTIFIED: 2141
TIME NOTIFIED: 2153
VANCOMYCIN SERPL-MCNC: 12.2 UG/ML
VT: 450
VT: 550
WBC # BLD AUTO: 26.84 K/UL (ref 3.9–12.7)
WBC # BLD AUTO: 28.1 K/UL (ref 3.9–12.7)
WBC # BLD AUTO: 28.19 K/UL (ref 3.9–12.7)
WBC # BLD AUTO: 29.44 K/UL (ref 3.9–12.7)

## 2022-07-24 PROCEDURE — 80053 COMPREHEN METABOLIC PANEL: CPT | Performed by: THORACIC SURGERY (CARDIOTHORACIC VASCULAR SURGERY)

## 2022-07-24 PROCEDURE — 84295 ASSAY OF SERUM SODIUM: CPT

## 2022-07-24 PROCEDURE — 85014 HEMATOCRIT: CPT

## 2022-07-24 PROCEDURE — 99900035 HC TECH TIME PER 15 MIN (STAT)

## 2022-07-24 PROCEDURE — 99291 CRITICAL CARE FIRST HOUR: CPT | Mod: ,,, | Performed by: INTERNAL MEDICINE

## 2022-07-24 PROCEDURE — 25000003 PHARM REV CODE 250

## 2022-07-24 PROCEDURE — 25000242 PHARM REV CODE 250 ALT 637 W/ HCPCS: Performed by: STUDENT IN AN ORGANIZED HEALTH CARE EDUCATION/TRAINING PROGRAM

## 2022-07-24 PROCEDURE — 93750 PR INTERROGATE VENT ASSIST DEV, IN PERSON, W PHYSICIAN ANALYSIS: ICD-10-PCS | Mod: ,,, | Performed by: INTERNAL MEDICINE

## 2022-07-24 PROCEDURE — 94640 AIRWAY INHALATION TREATMENT: CPT

## 2022-07-24 PROCEDURE — 82800 BLOOD PH: CPT

## 2022-07-24 PROCEDURE — 83615 LACTATE (LD) (LDH) ENZYME: CPT | Performed by: STUDENT IN AN ORGANIZED HEALTH CARE EDUCATION/TRAINING PROGRAM

## 2022-07-24 PROCEDURE — 63600175 PHARM REV CODE 636 W HCPCS

## 2022-07-24 PROCEDURE — 85384 FIBRINOGEN ACTIVITY: CPT | Performed by: THORACIC SURGERY (CARDIOTHORACIC VASCULAR SURGERY)

## 2022-07-24 PROCEDURE — 99900026 HC AIRWAY MAINTENANCE (STAT)

## 2022-07-24 PROCEDURE — 63600175 PHARM REV CODE 636 W HCPCS: Performed by: THORACIC SURGERY (CARDIOTHORACIC VASCULAR SURGERY)

## 2022-07-24 PROCEDURE — 82803 BLOOD GASES ANY COMBINATION: CPT

## 2022-07-24 PROCEDURE — 99291 PR CRITICAL CARE, E/M 30-74 MINUTES: ICD-10-PCS | Mod: ,,, | Performed by: ANESTHESIOLOGY

## 2022-07-24 PROCEDURE — C9113 INJ PANTOPRAZOLE SODIUM, VIA: HCPCS

## 2022-07-24 PROCEDURE — 27000248 HC VAD-ADDITIONAL DAY

## 2022-07-24 PROCEDURE — 94761 N-INVAS EAR/PLS OXIMETRY MLT: CPT

## 2022-07-24 PROCEDURE — 37799 UNLISTED PX VASCULAR SURGERY: CPT

## 2022-07-24 PROCEDURE — 25000003 PHARM REV CODE 250: Performed by: PHYSICIAN ASSISTANT

## 2022-07-24 PROCEDURE — 83605 ASSAY OF LACTIC ACID: CPT

## 2022-07-24 PROCEDURE — 85027 COMPLETE CBC AUTOMATED: CPT | Performed by: THORACIC SURGERY (CARDIOTHORACIC VASCULAR SURGERY)

## 2022-07-24 PROCEDURE — 80176 ASSAY OF LIDOCAINE: CPT | Performed by: THORACIC SURGERY (CARDIOTHORACIC VASCULAR SURGERY)

## 2022-07-24 PROCEDURE — 93750 INTERROGATION VAD IN PERSON: CPT | Mod: ,,, | Performed by: INTERNAL MEDICINE

## 2022-07-24 PROCEDURE — 36620 INSERTION CATHETER ARTERY: CPT

## 2022-07-24 PROCEDURE — 25000003 PHARM REV CODE 250: Performed by: STUDENT IN AN ORGANIZED HEALTH CARE EDUCATION/TRAINING PROGRAM

## 2022-07-24 PROCEDURE — 25000003 PHARM REV CODE 250: Performed by: THORACIC SURGERY (CARDIOTHORACIC VASCULAR SURGERY)

## 2022-07-24 PROCEDURE — 93010 EKG 12-LEAD: ICD-10-PCS | Mod: ,,, | Performed by: INTERNAL MEDICINE

## 2022-07-24 PROCEDURE — 93005 ELECTROCARDIOGRAM TRACING: CPT

## 2022-07-24 PROCEDURE — 80202 ASSAY OF VANCOMYCIN: CPT | Performed by: PHYSICIAN ASSISTANT

## 2022-07-24 PROCEDURE — 99233 PR SUBSEQUENT HOSPITAL CARE,LEVL III: ICD-10-PCS | Mod: ,,, | Performed by: INTERNAL MEDICINE

## 2022-07-24 PROCEDURE — 84132 ASSAY OF SERUM POTASSIUM: CPT

## 2022-07-24 PROCEDURE — 93010 ELECTROCARDIOGRAM REPORT: CPT | Mod: ,,, | Performed by: INTERNAL MEDICINE

## 2022-07-24 PROCEDURE — 27200966 HC CLOSED SUCTION SYSTEM

## 2022-07-24 PROCEDURE — 83735 ASSAY OF MAGNESIUM: CPT | Mod: 91 | Performed by: THORACIC SURGERY (CARDIOTHORACIC VASCULAR SURGERY)

## 2022-07-24 PROCEDURE — 85025 COMPLETE CBC W/AUTO DIFF WBC: CPT | Performed by: THORACIC SURGERY (CARDIOTHORACIC VASCULAR SURGERY)

## 2022-07-24 PROCEDURE — 99233 SBSQ HOSP IP/OBS HIGH 50: CPT | Mod: ,,, | Performed by: INTERNAL MEDICINE

## 2022-07-24 PROCEDURE — 85007 BL SMEAR W/DIFF WBC COUNT: CPT | Performed by: THORACIC SURGERY (CARDIOTHORACIC VASCULAR SURGERY)

## 2022-07-24 PROCEDURE — 85610 PROTHROMBIN TIME: CPT | Performed by: THORACIC SURGERY (CARDIOTHORACIC VASCULAR SURGERY)

## 2022-07-24 PROCEDURE — 82330 ASSAY OF CALCIUM: CPT

## 2022-07-24 PROCEDURE — 99291 CRITICAL CARE FIRST HOUR: CPT | Mod: ,,, | Performed by: ANESTHESIOLOGY

## 2022-07-24 PROCEDURE — 82330 ASSAY OF CALCIUM: CPT | Performed by: THORACIC SURGERY (CARDIOTHORACIC VASCULAR SURGERY)

## 2022-07-24 PROCEDURE — 63600367 HC NITRIC OXIDE PER HOUR

## 2022-07-24 PROCEDURE — 27000221 HC OXYGEN, UP TO 24 HOURS

## 2022-07-24 PROCEDURE — 85730 THROMBOPLASTIN TIME PARTIAL: CPT | Mod: 91 | Performed by: THORACIC SURGERY (CARDIOTHORACIC VASCULAR SURGERY)

## 2022-07-24 PROCEDURE — 84100 ASSAY OF PHOSPHORUS: CPT | Mod: 91 | Performed by: THORACIC SURGERY (CARDIOTHORACIC VASCULAR SURGERY)

## 2022-07-24 PROCEDURE — 63600175 PHARM REV CODE 636 W HCPCS: Performed by: STUDENT IN AN ORGANIZED HEALTH CARE EDUCATION/TRAINING PROGRAM

## 2022-07-24 PROCEDURE — 82565 ASSAY OF CREATININE: CPT

## 2022-07-24 PROCEDURE — 94003 VENT MGMT INPAT SUBQ DAY: CPT

## 2022-07-24 PROCEDURE — 20000000 HC ICU ROOM

## 2022-07-24 PROCEDURE — 99291 PR CRITICAL CARE, E/M 30-74 MINUTES: ICD-10-PCS | Mod: ,,, | Performed by: INTERNAL MEDICINE

## 2022-07-24 RX ORDER — MAGNESIUM SULFATE HEPTAHYDRATE 40 MG/ML
2 INJECTION, SOLUTION INTRAVENOUS ONCE
Status: COMPLETED | OUTPATIENT
Start: 2022-07-24 | End: 2022-07-25

## 2022-07-24 RX ORDER — POTASSIUM CHLORIDE 29.8 MG/ML
40 INJECTION INTRAVENOUS ONCE
Status: COMPLETED | OUTPATIENT
Start: 2022-07-24 | End: 2022-07-25

## 2022-07-24 RX ORDER — FUROSEMIDE 10 MG/ML
10 INJECTION INTRAMUSCULAR; INTRAVENOUS ONCE
Status: COMPLETED | OUTPATIENT
Start: 2022-07-24 | End: 2022-07-24

## 2022-07-24 RX ORDER — LIDOCAINE HYDROCHLORIDE 20 MG/ML
100 INJECTION INTRAVENOUS ONCE
Status: COMPLETED | OUTPATIENT
Start: 2022-07-24 | End: 2022-07-24

## 2022-07-24 RX ORDER — POTASSIUM CHLORIDE 14.9 MG/ML
20 INJECTION INTRAVENOUS ONCE
Status: COMPLETED | OUTPATIENT
Start: 2022-07-24 | End: 2022-07-24

## 2022-07-24 RX ORDER — POTASSIUM CHLORIDE 29.8 MG/ML
40 INJECTION INTRAVENOUS ONCE
Status: COMPLETED | OUTPATIENT
Start: 2022-07-24 | End: 2022-07-24

## 2022-07-24 RX ORDER — VANCOMYCIN HCL IN 5 % DEXTROSE 1G/250ML
1000 PLASTIC BAG, INJECTION (ML) INTRAVENOUS ONCE
Status: DISCONTINUED | OUTPATIENT
Start: 2022-07-24 | End: 2022-07-24

## 2022-07-24 RX ADMIN — PROPOFOL 20 MCG/KG/MIN: 10 INJECTION, EMULSION INTRAVENOUS at 09:07

## 2022-07-24 RX ADMIN — NOREPINEPHRINE BITARTRATE 0.04 MCG/KG/MIN: 8 INJECTION, SOLUTION INTRAVENOUS at 05:07

## 2022-07-24 RX ADMIN — DEXMEDETOMIDINE HYDROCHLORIDE 0.2 MCG/KG/HR: 4 INJECTION INTRAVENOUS at 05:07

## 2022-07-24 RX ADMIN — ALBUTEROL SULFATE 2.5 MG: 2.5 SOLUTION RESPIRATORY (INHALATION) at 08:07

## 2022-07-24 RX ADMIN — AMIODARONE HYDROCHLORIDE 1 MG/MIN: 1.8 INJECTION, SOLUTION INTRAVENOUS at 08:07

## 2022-07-24 RX ADMIN — MIDODRINE HYDROCHLORIDE 15 MG: 5 TABLET ORAL at 06:07

## 2022-07-24 RX ADMIN — PANTOPRAZOLE SODIUM 40 MG: 40 INJECTION, POWDER, FOR SOLUTION INTRAVENOUS at 08:07

## 2022-07-24 RX ADMIN — ALBUTEROL SULFATE 2.5 MG: 2.5 SOLUTION RESPIRATORY (INHALATION) at 03:07

## 2022-07-24 RX ADMIN — ACETYLCYSTEINE 4 ML: 100 INHALANT RESPIRATORY (INHALATION) at 03:07

## 2022-07-24 RX ADMIN — ASPIRIN 81 MG CHEWABLE TABLET 81 MG: 81 TABLET CHEWABLE at 08:07

## 2022-07-24 RX ADMIN — AMIODARONE HYDROCHLORIDE 400 MG: 200 TABLET ORAL at 08:07

## 2022-07-24 RX ADMIN — EPINEPHRINE 0.03 MCG/KG/MIN: 1 INJECTION INTRAMUSCULAR; INTRAVENOUS; SUBCUTANEOUS at 06:07

## 2022-07-24 RX ADMIN — POTASSIUM CHLORIDE 40 MEQ: 29.8 INJECTION, SOLUTION INTRAVENOUS at 10:07

## 2022-07-24 RX ADMIN — FUROSEMIDE 10 MG: 10 INJECTION, SOLUTION INTRAMUSCULAR; INTRAVENOUS at 09:07

## 2022-07-24 RX ADMIN — MEXILETINE HYDROCHLORIDE 250 MG: 250 CAPSULE ORAL at 06:07

## 2022-07-24 RX ADMIN — HYDROMORPHONE HYDROCHLORIDE 1 MG: 1 INJECTION, SOLUTION INTRAMUSCULAR; INTRAVENOUS; SUBCUTANEOUS at 12:07

## 2022-07-24 RX ADMIN — MIDODRINE HYDROCHLORIDE 15 MG: 5 TABLET ORAL at 01:07

## 2022-07-24 RX ADMIN — MEXILETINE HYDROCHLORIDE 250 MG: 250 CAPSULE ORAL at 01:07

## 2022-07-24 RX ADMIN — SODIUM CHLORIDE 500 ML: 0.45 INJECTION, SOLUTION INTRAVENOUS at 11:07

## 2022-07-24 RX ADMIN — POLYETHYLENE GLYCOL 3350 17 G: 17 POWDER, FOR SOLUTION ORAL at 08:07

## 2022-07-24 RX ADMIN — AMIODARONE HYDROCHLORIDE 0.5 MG/MIN: 1.8 INJECTION, SOLUTION INTRAVENOUS at 12:07

## 2022-07-24 RX ADMIN — AMIODARONE HYDROCHLORIDE 400 MG: 200 TABLET ORAL at 09:07

## 2022-07-24 RX ADMIN — POTASSIUM CHLORIDE 40 MEQ: 29.8 INJECTION, SOLUTION INTRAVENOUS at 01:07

## 2022-07-24 RX ADMIN — DEXMEDETOMIDINE HYDROCHLORIDE 0.8 MCG/KG/HR: 4 INJECTION INTRAVENOUS at 08:07

## 2022-07-24 RX ADMIN — POTASSIUM CHLORIDE 20 MEQ: 14.9 INJECTION, SOLUTION INTRAVENOUS at 04:07

## 2022-07-24 RX ADMIN — ANGIOTENSIN II 11 NG/KG/MIN: 2.5 INJECTION INTRAVENOUS at 04:07

## 2022-07-24 RX ADMIN — HYDROMORPHONE HYDROCHLORIDE 1 MG: 1 INJECTION, SOLUTION INTRAMUSCULAR; INTRAVENOUS; SUBCUTANEOUS at 06:07

## 2022-07-24 RX ADMIN — MAGNESIUM SULFATE HEPTAHYDRATE 2 G: 40 INJECTION, SOLUTION INTRAVENOUS at 10:07

## 2022-07-24 RX ADMIN — HYDROMORPHONE HYDROCHLORIDE 1 MG: 1 INJECTION, SOLUTION INTRAMUSCULAR; INTRAVENOUS; SUBCUTANEOUS at 03:07

## 2022-07-24 RX ADMIN — MIDODRINE HYDROCHLORIDE 15 MG: 5 TABLET ORAL at 09:07

## 2022-07-24 RX ADMIN — PANTOPRAZOLE SODIUM 40 MG: 40 INJECTION, POWDER, FOR SOLUTION INTRAVENOUS at 09:07

## 2022-07-24 RX ADMIN — PROPOFOL 20 MCG/KG/MIN: 10 INJECTION, EMULSION INTRAVENOUS at 03:07

## 2022-07-24 RX ADMIN — GUAIFENESIN 300 MG: 100 SOLUTION ORAL at 11:07

## 2022-07-24 RX ADMIN — GUAIFENESIN 300 MG: 100 SOLUTION ORAL at 01:07

## 2022-07-24 RX ADMIN — DEXMEDETOMIDINE HYDROCHLORIDE 1 MCG/KG/HR: 4 INJECTION INTRAVENOUS at 10:07

## 2022-07-24 RX ADMIN — POTASSIUM CHLORIDE 20 MEQ: 14.9 INJECTION, SOLUTION INTRAVENOUS at 07:07

## 2022-07-24 RX ADMIN — DEXMEDETOMIDINE HYDROCHLORIDE 0.8 MCG/KG/HR: 4 INJECTION INTRAVENOUS at 12:07

## 2022-07-24 RX ADMIN — HYDROMORPHONE HYDROCHLORIDE 1 MG: 1 INJECTION, SOLUTION INTRAMUSCULAR; INTRAVENOUS; SUBCUTANEOUS at 07:07

## 2022-07-24 RX ADMIN — LIDOCAINE HYDROCHLORIDE 0.5 MG/MIN: 8 INJECTION, SOLUTION INTRAVENOUS at 03:07

## 2022-07-24 RX ADMIN — ACETYLCYSTEINE 4 ML: 100 INHALANT RESPIRATORY (INHALATION) at 08:07

## 2022-07-24 RX ADMIN — MEROPENEM 2 G: 1 INJECTION INTRAVENOUS at 08:07

## 2022-07-24 RX ADMIN — GUAIFENESIN 300 MG: 100 SOLUTION ORAL at 06:07

## 2022-07-24 RX ADMIN — LEVOTHYROXINE SODIUM 112 MCG: 112 TABLET ORAL at 06:07

## 2022-07-24 RX ADMIN — LIDOCAINE HYDROCHLORIDE 100 MG: 20 INJECTION INTRAVENOUS at 08:07

## 2022-07-24 RX ADMIN — HYDROMORPHONE HYDROCHLORIDE 1 MG: 1 INJECTION, SOLUTION INTRAMUSCULAR; INTRAVENOUS; SUBCUTANEOUS at 10:07

## 2022-07-24 NOTE — SUBJECTIVE & OBJECTIVE
Interval History:   No acute events overnight. Intubated and able to respond appropriately and follow commands. UOP 1+L yesterday.     CVP: 7-9  UOP: 100 ml/hr    Continuous Infusions:   amiodarone in dextrose 5% 0.5 mg/min (07/24/22 0900)    angiotensin II 10 ng/kg/min (07/24/22 0900)    dexmedetomidine (PRECEDEX) infusion 0.6 mcg/kg/hr (07/24/22 0900)    EPINEPHrine 0.04 mcg/kg/min (07/24/22 0900)    furosemide (LASIX) 2 mg/mL continuous infusion (non-titrating) 0.5 mg/hr (07/24/22 0900)    heparin (porcine) in 5 % dex 1,600 Units/hr (07/24/22 0900)    LIDOcaine 0.5 mg/min (07/24/22 0900)    nitric oxide gas      NORepinephrine bitartrate-D5W 0.04 mcg/kg/min (07/24/22 0900)    propofoL Stopped (07/24/22 0829)    vasopressin 0.04 Units/min (07/24/22 0900)     Scheduled Meds:   acetylcysteine 100 mg/ml (10%)  4 mL Nebulization TID WAKE    albuterol sulfate  2.5 mg Nebulization TID WAKE    amiodarone  400 mg Oral BID    aspirin  81 mg Per OG tube Daily    guaiFENesin 100 mg/5 ml  300 mg Per NG tube Q6H    levothyroxine  112 mcg Per OG tube Before breakfast    meropenem (MERREM) IVPB  2 g Intravenous Q12H    mexiletine  250 mg Per NG tube Q8H    midodrine  15 mg Per NG tube Q8H    pantoprazole  40 mg Intravenous BID    polyethylene glycol  17 g Per NG tube Daily     PRN Meds:sodium chloride 0.9%, sodium chloride 0.9%, dextrose 10%, dextrose 10%, HYDROmorphone, HYDROmorphone, Pharmacy to dose Vancomycin consult **AND** vancomycin - pharmacy to dose    Review of patient's allergies indicates:   Allergen Reactions    Lisinopril Anaphylaxis    Hydralazine analogues      Chronic constipation, impotence, dizziness     Objective:     Vital Signs (Most Recent):  Temp: 99.5 °F (37.5 °C) (07/24/22 1007)  Pulse: 89 (07/24/22 1007)  Resp: (!) 21 (07/24/22 0820)  BP: (!) 82/0 (07/24/22 0730)  SpO2: 99 % (07/24/22 0315)   Vital Signs (24h Range):  Temp:  [99.32 °F (37.4 °C)-100.94 °F (38.3 °C)] 99.5 °F (37.5 °C)  Pulse:  []  89  Resp:  [20-32] 21  SpO2:  [98 %-99 %] 99 %  BP: (75-82)/(0) 82/0  Arterial Line BP: (75-94)/(58-85) 88/78     Patient Vitals for the past 72 hrs (Last 3 readings):   Weight   07/24/22 0500 102.1 kg (225 lb)   07/23/22 0500 103.4 kg (228 lb)   07/22/22 0500 98.9 kg (218 lb)       Body mass index is 29.69 kg/m².      Intake/Output Summary (Last 24 hours) at 7/24/2022 1033  Last data filed at 7/24/2022 0900  Gross per 24 hour   Intake 4012.45 ml   Output 2975 ml   Net 1037.45 ml       Physical Exam  Constitutional:       General: He is not in acute distress.     Appearance: He is obese. He is ill-appearing.      Comments: Intubated   HENT:      Head: Normocephalic.      Nose: Nose normal.      Mouth/Throat:      Mouth: Mucous membranes are moist.   Eyes:      General: Scleral icterus present.   Cardiovascular:      Rate and Rhythm: Normal rate and regular rhythm.      Comments: VAD hum appreciated  Pulses detected via doppler  Pulmonary:      Comments: Coarse mechanical breath sounds bilaterally  Abdominal:      General: Bowel sounds are normal.      Palpations: Abdomen is soft.   Musculoskeletal:      Cervical back: Neck supple.      Right lower leg: No edema.      Left lower leg: No edema.   Skin:     General: Skin is warm.   Neurological:      General: No focal deficit present.       Significant Labs:  CBC:  Recent Labs   Lab 07/23/22  1645 07/23/22 2001 07/23/22 2006 07/24/22  0132 07/24/22  0352 07/24/22  0827   WBC 29.88* 28.14*  --   --  28.10*  --    RBC 2.65* 2.54*  --   --  2.51*  --    HGB 7.8* 7.5*  --   --  7.4*  --    HCT 24.5* 23.1*   < > 24* 23.1* 20*    330  --   --  332  --    MCV 93 91  --   --  92  --    MCH 29.4 29.5  --   --  29.5  --    MCHC 31.8* 32.5  --   --  32.0  --     < > = values in this interval not displayed.       BNP:  Recent Labs   Lab 07/20/22  0421 07/22/22  0317   * 2,579*       CMP:  Recent Labs   Lab 07/23/22  1645 07/23/22 2001 07/24/22  0352   *  118* 143*   CALCIUM 9.1 9.1 10.1   ALBUMIN 1.7* 1.7* 1.9*   PROT 6.3 6.1 6.8    143 144   K 3.6 3.6 4.2   CO2 22* 24 26    109 111*   BUN 40* 41* 44*   CREATININE 2.1* 2.1* 2.1*   ALKPHOS 102 101 108   ALT 33 34 35   AST 51* 55* 58*   BILITOT 11.2* 11.3* 11.4*        Coagulation:   Recent Labs   Lab 07/23/22  1645 07/23/22 2001 07/24/22  0352   INR 1.2 1.2 1.2   APTT 32.5* 32.9* 31.9       LDH:  Recent Labs   Lab 07/22/22  0317 07/23/22  0402 07/24/22  0352   * 479* 566*       Microbiology:  Microbiology Results (last 7 days)       Procedure Component Value Units Date/Time    Blood culture [[id number]] Collected: 07/20/22 1445    Order Status: Completed Specimen: Blood from Peripheral, Forearm, Left Updated: 07/23/22 1612     Blood Culture, Routine No Growth to date      No Growth to date      No Growth to date      No Growth to date    Blood culture [[id number]] Collected: 07/20/22 1450    Order Status: Completed Specimen: Blood from Wrist, Left Updated: 07/23/22 1612     Blood Culture, Routine No Growth to date      No Growth to date      No Growth to date      No Growth to date    Culture, VAP (BAL) [[id number]]  (Abnormal)  (Susceptibility) Collected: 07/20/22 1120    Order Status: Completed Specimen: Bronchial Alveolar Lavage from BAL, [facility] Updated: 07/23/22 1048     VAP BAL CULTURE KLEBSIELLA AEROGENES  Few  Normal respiratory sachin also present       Gram Stain (Respiratory) Few WBC's     Gram Stain (Respiratory) Rare Gram positive cocci     Gram Stain (Respiratory) Rare Gram negative rods    Blood culture [[id number]] Collected: 07/22/22 0857    Order Status: Completed Specimen: Blood from Peripheral, Antecubital, Right Updated: 07/23/22 1012     Blood Culture, Routine No Growth to date      No Growth to date    Blood culture [[id number]] Collected: 07/22/22 0918    Order Status: Completed Specimen: Blood from Peripheral, Hand, Left Updated: 07/23/22 1012     Blood Culture, Routine No Growth to  date      No Growth to date    Blood culture [[id number]]     Order Status: Canceled Specimen: Blood     Blood culture [[id number]]     Order Status: Canceled Specimen: Blood             I have reviewed all pertinent labs within the past 24 hours.    Estimated Creatinine Clearance: 49.9 mL/min (A) (based on SCr of 2.1 mg/dL (H)).    Diagnostic Results:  I have reviewed and interpreted all pertinent imaging results/findings within the past 24 hours.

## 2022-07-24 NOTE — SUBJECTIVE & OBJECTIVE
Interval History: ". Patient remains intubated and febrile. BAL culture on 7/20 now with K aerogenes. Blood cultures remain NGTD.    Review of Systems   Unable to perform ROS: Intubated   Objective:     Vital Signs (Most Recent):  Temp: 99.14 °F (37.3 °C) (07/24/22 1400)  Pulse: 66 (07/24/22 1400)  Resp: (!) 27 (07/24/22 1224)  BP: (!) 82/0 (07/24/22 0730)  SpO2: 99 % (07/24/22 0315)   Vital Signs (24h Range):  Temp:  [99.14 °F (37.3 °C)-100.76 °F (38.2 °C)] 99.14 °F (37.3 °C)  Pulse:  [] 66  Resp:  [20-32] 27  SpO2:  [98 %-99 %] 99 %  BP: (75-82)/(0) 82/0  Arterial Line BP: (75-94)/(58-85) 82/72     Weight: 102.1 kg (225 lb)  Body mass index is 29.69 kg/m².    Estimated Creatinine Clearance: 55.2 mL/min (A) (based on SCr of 1.9 mg/dL (H)).    Physical Exam  Vitals and nursing note reviewed. Exam conducted with a chaperone present.   Constitutional:       General: He is sleeping.      Appearance: He is ill-appearing.      Interventions: He is sedated and intubated.   HENT:      Head: Normocephalic and atraumatic.   Eyes:      General: Scleral icterus present.         Right eye: No discharge.         Left eye: No discharge.      Conjunctiva/sclera: Conjunctivae normal.   Cardiovascular:      Pulses: Normal pulses.      Comments: Distant VAD Humming sounds  Pulmonary:      Effort: Pulmonary effort is normal. No respiratory distress. He is intubated.      Breath sounds: No stridor. Rales present. No wheezing or rhonchi.   Abdominal:      General: Bowel sounds are decreased. There is no distension.      Palpations: Abdomen is soft.      Tenderness: There is no abdominal tenderness.      Comments: Left sided DLES covered with gauze dressing soiled with yellow sanguinous secretions.    Musculoskeletal:         General: Normal range of motion.   Skin:     General: Skin is warm and dry.   Neurological:      General: No focal deficit present.      Mental Status: He is easily aroused.      Comments: Awakens with verbal  angelica Valdiviakes head for yes/no questions.        Significant Labs: Blood Culture:   Recent Labs   Lab 07/20/22  1445 07/20/22  1450 07/22/22  0857 07/22/22  0918   LABBLOO No Growth to date  No Growth to date  No Growth to date  No Growth to date No Growth to date  No Growth to date  No Growth to date  No Growth to date No Growth to date  No Growth to date  No Growth to date No Growth to date  No Growth to date  No Growth to date       BMP:   Recent Labs   Lab 07/24/22  1006   *      K 3.7      CO2 22*   BUN 37*   CREATININE 1.9*   CALCIUM 9.3   MG 2.6       CBC:   Recent Labs   Lab 07/23/22 2001 07/23/22 2006 07/24/22  0352 07/24/22  0827 07/24/22  1006 07/24/22  1349   WBC 28.14*  --  28.10*  --  29.44*  --    HGB 7.5*  --  7.4*  --  7.6*  --    HCT 23.1*   < > 23.1* 20* 24.6* 22*     --  332  --  349  --     < > = values in this interval not displayed.       Respiratory Culture:   Recent Labs   Lab 07/20/22  1120   GSRESP Few WBC's  Rare Gram positive cocci  Rare Gram negative rods       Urine Culture: No results for input(s): LABURIN in the last 4320 hours.  Urine Studies:   Recent Labs   Lab 06/28/22  1137   COLORU Yellow   APPEARANCEUA Clear   PHUR 5.0   SPECGRAV 1.010   PROTEINUA Negative   GLUCUA Negative   KETONESU Negative   BILIRUBINUA Negative   OCCULTUA 2+*   NITRITE Negative   LEUKOCYTESUR Negative   RBCUA 0   WBCUA 2   SQUAMEPITHEL 0   HYALINECASTS 4*       Wound Culture: No results for input(s): LABAERO in the last 4320 hours.    Significant Imaging: I have reviewed all pertinent imaging results/findings within the past 24 hours.

## 2022-07-24 NOTE — NURSING
2011-  VAD coordinator paged regarding using VAD kits vs. soap and water 2/2 dressing be changed so frequently.    2014-  Bridget Freeman, VAD coordinator, returned paged.  Stated to use soap and water.

## 2022-07-24 NOTE — ASSESSMENT & PLAN NOTE
The patient presented with COVID and found to have an uptrending LDH with increased power.  He underwent HM III upgrade on 7/13/22  -Daily LDH   -Continue Heparin drip    Procedure: Device Interrogation Including analysis of device parameters  Current Settings: Ventricular Assist Device    TXP LVAD INTERROGATIONS 7/24/2022 7/24/2022 7/24/2022 7/24/2022 7/24/2022 7/24/2022 7/24/2022   Type HeartMate3 HeartMate3 HeartMate3 HeartMate3 HeartMate3 HeartMate3 HeartMate3   Flow 5.5 5.6 5.5 5.5 5.4 5.5 5.5   Speed 6400 6450 6400 6400 6400 6400 6400   PI 3.1 3.2 3.3 3.4 3.4 3.3 3.1   Power (Jackson) 5.5 5.5 5.5 5.5 5.5 5.6 5.5   LSL - - - 6000 6000 6000 6000   Low Flow Alarm - - - - - - -   Pulsatility Intermittent pulse Intermittent pulse Intermittent pulse Intermittent pulse Intermittent pulse Intermittent pulse Intermittent pulse

## 2022-07-24 NOTE — PROGRESS NOTES
Dr. Nicolas and team at bedside changing wound vac to right groin.  Md aware of brown serous drainage from chest tube puncture sites.      Pt opens eyes to gentle touch, following commands, moving all extremities equally  Reoriented x3 per rn.

## 2022-07-24 NOTE — PROGRESS NOTES
"[facility] - Surgical Intensive Care  Infectious Disease  Progress Note    Patient Name: Tim Richards  MRN: [id number]  Admission Date: 6/27/2022  Length of Stay: 27 days  Attending Physician: Yg Kaufman MD  Primary Care Provider: Deyanira Booth MD    Isolation Status: No active isolations  Assessment/Plan:      Shock  Likely multifactorial . On multiple pressor support.   · Management per primary.    Leukocytosis  Leukocytosis with associated fever post-decannulation. Blcx so far ngtd, resp cx unrevealing. Pt is at risk for fungal infection (prior lines, multiple surgeries). S/P HM3 with removal of old driveline. Up-escalated to meropenem due to acute decompensation.     Leukocytosis stable. Sputum cultures with K aerogenes.    · Continue meropenem for now.  · De-escalate meropenem to ceftriaxone 2 gm IV daily on 7/25.   · Continue vancomycin for now. Monitor vancomycin trough.         Anticipated Disposition: per primary    Thank you for your consult. I will follow-up with patient. Please contact us if you have any additional questions.    Roxie Garg MD  Infectious Disease  [facility] - Surgical Intensive Care    Subjective:     Principal Problem:Left ventricular assist device (LVAD) complication    HPI: A 56-year-old man with HFrEF-10%, s/p VAD HM2 (March 2018), ICD, VT on amiodarone who developed malaise, anorexia, SOB, dark urine, and soft stools 3-4 days prior to admission. [place] [facility] time he tested positive for Covid-19 infection. He was admitted for further management and started on Remdesivir treatment protocol. Since admission, he feels significantly improved with bowel movements returning to normal and the shortness of breath subsiding.     Infectious Diseases consulted for "covid infection, acute. Patient with LVAD"    ID reconsulted 7/21 for "sepsis and consideration for fungemia". Since pt was last seen by ID, patient underwent AV repair, redo sternotomy, " explant of old lvad HM2 with implantation of HM3, and placed on VA-ECMO, takeback 7/14 for mediastinal washout/hematoma, and washout, sternal closure, creation of moises-pericardium (gerotex membrane) and wound vac placement on 7/15. Decannulated ECMO on 7/19 with subsequent fever and hypotension. Pt was placed on broad spectrum abx. Blcx obtained from 7/20 ngtd. S/p bronch on 7/20 - cx with normal respiratory sachin.           Interval History: ". Patient remains intubated and febrile. BAL culture on 7/20 now with K aerogenes. Blood cultures remain NGTD.    Review of Systems   Unable to perform ROS: Intubated   Objective:     Vital Signs (Most Recent):  Temp: 99.14 °F (37.3 °C) (07/24/22 1400)  Pulse: 66 (07/24/22 1400)  Resp: (!) 27 (07/24/22 1224)  BP: (!) 82/0 (07/24/22 0730)  SpO2: 99 % (07/24/22 0315)   Vital Signs (24h Range):  Temp:  [99.14 °F (37.3 °C)-100.76 °F (38.2 °C)] 99.14 °F (37.3 °C)  Pulse:  [] 66  Resp:  [20-32] 27  SpO2:  [98 %-99 %] 99 %  BP: (75-82)/(0) 82/0  Arterial Line BP: (75-94)/(58-85) 82/72     Weight: 102.1 kg (225 lb)  Body mass index is 29.69 kg/m².    Estimated Creatinine Clearance: 55.2 mL/min (A) (based on SCr of 1.9 mg/dL (H)).    Physical Exam  Vitals and nursing note reviewed. Exam conducted with a chaperone present.   Constitutional:       General: He is sleeping.      Appearance: He is ill-appearing.      Interventions: He is sedated and intubated.   HENT:      Head: Normocephalic and atraumatic.   Eyes:      General: Scleral icterus present.         Right eye: No discharge.         Left eye: No discharge.      Conjunctiva/sclera: Conjunctivae normal.   Cardiovascular:      Pulses: Normal pulses.      Comments: Distant VAD Humming sounds  Pulmonary:      Effort: Pulmonary effort is normal. No respiratory distress. He is intubated.      Breath sounds: No stridor. Rales present. No wheezing or rhonchi.   Abdominal:      General: Bowel sounds are decreased. There is no  distension.      Palpations: Abdomen is soft.      Tenderness: There is no abdominal tenderness.      Comments: Left sided DLES covered with gauze dressing soiled with yellow sanguinous secretions.    Musculoskeletal:         General: Normal range of motion.   Skin:     General: Skin is warm and dry.   Neurological:      General: No focal deficit present.      Mental Status: He is easily aroused.      Comments: Awakens with verbal stimuli. Shakes head for yes/no questions.        Significant Labs: Blood Culture:   Recent Labs   Lab 07/20/22  1445 07/20/22  1450 07/22/22  0857 07/22/22  0918   LABBLOO No Growth to date  No Growth to date  No Growth to date  No Growth to date No Growth to date  No Growth to date  No Growth to date  No Growth to date No Growth to date  No Growth to date  No Growth to date No Growth to date  No Growth to date  No Growth to date       BMP:   Recent Labs   Lab 07/24/22  1006   *      K 3.7      CO2 22*   BUN 37*   CREATININE 1.9*   CALCIUM 9.3   MG 2.6       CBC:   Recent Labs   Lab 07/23/22 2001 07/23/22  2006 07/24/22  0352 07/24/22  0827 07/24/22  1006 07/24/22  1349   WBC 28.14*  --  28.10*  --  29.44*  --    HGB 7.5*  --  7.4*  --  7.6*  --    HCT 23.1*   < > 23.1* 20* 24.6* 22*     --  332  --  349  --     < > = values in this interval not displayed.       Respiratory Culture:   Recent Labs   Lab 07/20/22  1120   GSRESP Few WBC's  Rare Gram positive cocci  Rare Gram negative rods       Urine Culture: No results for input(s): LABURIN in the last 4320 hours.  Urine Studies:   Recent Labs   Lab 06/28/22  1137   COLORU Yellow   APPEARANCEUA Clear   PHUR 5.0   SPECGRAV 1.010   PROTEINUA Negative   GLUCUA Negative   KETONESU Negative   BILIRUBINUA Negative   OCCULTUA 2+*   NITRITE Negative   LEUKOCYTESUR Negative   RBCUA 0   WBCUA 2   SQUAMEPITHEL 0   HYALINECASTS 4*       Wound Culture: No results for input(s): LABAERO in the last 4320  hours.    Significant Imaging: I have reviewed all pertinent imaging results/findings within the past 24 hours.

## 2022-07-24 NOTE — PROGRESS NOTES
[facility] - Surgical Intensive Care  Heart Transplant  Progress Note    Patient Name: Tim Richards  MRN: [id number]  Admission Date: 6/27/2022  Hospital Length of Stay: 27 days  Attending Physician: Yg Kaufman MD  Primary Care Provider: Deyanira Booth MD  Principal Problem:Left ventricular assist device (LVAD) complication    Subjective:     Interval History:   No acute events overnight. Intubated and able to respond appropriately and follow commands. UOP 1+L yesterday. CTS is primary.    CVP: 7-9  UOP: 100 ml/hr    Continuous Infusions:   amiodarone in dextrose 5% 0.5 mg/min (07/24/22 0900)    angiotensin II 10 ng/kg/min (07/24/22 0900)    dexmedetomidine (PRECEDEX) infusion 0.6 mcg/kg/hr (07/24/22 0900)    EPINEPHrine 0.04 mcg/kg/min (07/24/22 0900)    furosemide (LASIX) 2 mg/mL continuous infusion (non-titrating) 0.5 mg/hr (07/24/22 0900)    heparin (porcine) in 5 % dex 1,600 Units/hr (07/24/22 0900)    LIDOcaine 0.5 mg/min (07/24/22 0900)    nitric oxide gas      NORepinephrine bitartrate-D5W 0.04 mcg/kg/min (07/24/22 0900)    propofoL Stopped (07/24/22 0829)    vasopressin 0.04 Units/min (07/24/22 0900)     Scheduled Meds:   acetylcysteine 100 mg/ml (10%)  4 mL Nebulization TID WAKE    albuterol sulfate  2.5 mg Nebulization TID WAKE    amiodarone  400 mg Oral BID    aspirin  81 mg Per OG tube Daily    guaiFENesin 100 mg/5 ml  300 mg Per NG tube Q6H    levothyroxine  112 mcg Per OG tube Before breakfast    meropenem (MERREM) IVPB  2 g Intravenous Q12H    mexiletine  250 mg Per NG tube Q8H    midodrine  15 mg Per NG tube Q8H    pantoprazole  40 mg Intravenous BID    polyethylene glycol  17 g Per NG tube Daily     PRN Meds:sodium chloride 0.9%, sodium chloride 0.9%, dextrose 10%, dextrose 10%, HYDROmorphone, HYDROmorphone, Pharmacy to dose Vancomycin consult **AND** vancomycin - pharmacy to dose    Review of patient's allergies indicates:   Allergen Reactions    Lisinopril  Anaphylaxis    Hydralazine analogues      Chronic constipation, impotence, dizziness     Objective:     Vital Signs (Most Recent):  Temp: 99.5 °F (37.5 °C) (07/24/22 1007)  Pulse: 89 (07/24/22 1007)  Resp: (!) 21 (07/24/22 0820)  BP: (!) 82/0 (07/24/22 0730)  SpO2: 99 % (07/24/22 0315)   Vital Signs (24h Range):  Temp:  [99.32 °F (37.4 °C)-100.94 °F (38.3 °C)] 99.5 °F (37.5 °C)  Pulse:  [] 89  Resp:  [20-32] 21  SpO2:  [98 %-99 %] 99 %  BP: (75-82)/(0) 82/0  Arterial Line BP: (75-94)/(58-85) 88/78     Patient Vitals for the past 72 hrs (Last 3 readings):   Weight   07/24/22 0500 102.1 kg (225 lb)   07/23/22 0500 103.4 kg (228 lb)   07/22/22 0500 98.9 kg (218 lb)       Body mass index is 29.69 kg/m².      Intake/Output Summary (Last 24 hours) at 7/24/2022 1033  Last data filed at 7/24/2022 0900  Gross per 24 hour   Intake 4012.45 ml   Output 2975 ml   Net 1037.45 ml       Physical Exam  Constitutional:       General: He is not in acute distress.     Appearance: He is obese. He is ill-appearing.      Comments: Intubated   HENT:      Head: Normocephalic.      Nose: Nose normal.      Mouth/Throat:      Mouth: Mucous membranes are moist.   Eyes:      General: Scleral icterus present.   Cardiovascular:      Rate and Rhythm: Normal rate and regular rhythm.      Comments: VAD hum appreciated  Pulses detected via doppler  Pulmonary:      Comments: Coarse mechanical breath sounds bilaterally  Abdominal:      General: Bowel sounds are normal.      Palpations: Abdomen is soft.   Musculoskeletal:      Cervical back: Neck supple.      Right lower leg: No edema.      Left lower leg: No edema.   Skin:     General: Skin is warm.   Neurological:      General: No focal deficit present.       Significant Labs:  CBC:  Recent Labs   Lab 07/23/22  1645 07/23/22 2001 07/23/22 2006 07/24/22  0132 07/24/22  0352 07/24/22  0827   WBC 29.88* 28.14*  --   --  28.10*  --    RBC 2.65* 2.54*  --   --  2.51*  --    HGB 7.8* 7.5*  --   --   7.4*  --    HCT 24.5* 23.1*   < > 24* 23.1* 20*    330  --   --  332  --    MCV 93 91  --   --  92  --    MCH 29.4 29.5  --   --  29.5  --    MCHC 31.8* 32.5  --   --  32.0  --     < > = values in this interval not displayed.       BNP:  Recent Labs   Lab 07/20/22  0421 07/22/22  0317   * 2,579*       CMP:  Recent Labs   Lab 07/23/22 1645 07/23/22 2001 07/24/22  0352   * 118* 143*   CALCIUM 9.1 9.1 10.1   ALBUMIN 1.7* 1.7* 1.9*   PROT 6.3 6.1 6.8    143 144   K 3.6 3.6 4.2   CO2 22* 24 26    109 111*   BUN 40* 41* 44*   CREATININE 2.1* 2.1* 2.1*   ALKPHOS 102 101 108   ALT 33 34 35   AST 51* 55* 58*   BILITOT 11.2* 11.3* 11.4*        Coagulation:   Recent Labs   Lab 07/23/22  1645 07/23/22 2001 07/24/22  0352   INR 1.2 1.2 1.2   APTT 32.5* 32.9* 31.9       LDH:  Recent Labs   Lab 07/22/22  0317 07/23/22  0402 07/24/22  0352   * 479* 566*       Microbiology:  Microbiology Results (last 7 days)       Procedure Component Value Units Date/Time    Blood culture [[id number]] Collected: 07/20/22 1445    Order Status: Completed Specimen: Blood from Peripheral, Forearm, Left Updated: 07/23/22 1612     Blood Culture, Routine No Growth to date      No Growth to date      No Growth to date      No Growth to date    Blood culture [[id number]] Collected: 07/20/22 1450    Order Status: Completed Specimen: Blood from Wrist, Left Updated: 07/23/22 1612     Blood Culture, Routine No Growth to date      No Growth to date      No Growth to date      No Growth to date    Culture, VAP (BAL) [[id number]]  (Abnormal)  (Susceptibility) Collected: 07/20/22 1120    Order Status: Completed Specimen: Bronchial Alveolar Lavage from BAL, RML Updated: 07/23/22 1048     VAP BAL CULTURE KLEBSIELLA AEROGENES  Few  Normal respiratory sachin also present       Gram Stain (Respiratory) Few WBC's     Gram Stain (Respiratory) Rare Gram positive cocci     Gram Stain (Respiratory) Rare Gram negative rods    Blood  culture [[id number]] Collected: 07/22/22 0857    Order Status: Completed Specimen: Blood from Peripheral, Antecubital, Right Updated: 07/23/22 1012     Blood Culture, Routine No Growth to date      No Growth to date    Blood culture [[id number]] Collected: 07/22/22 0918    Order Status: Completed Specimen: Blood from Peripheral, Hand, Left Updated: 07/23/22 1012     Blood Culture, Routine No Growth to date      No Growth to date    Blood culture [[id number]]     Order Status: Canceled Specimen: Blood     Blood culture [[id number]]     Order Status: Canceled Specimen: Blood             I have reviewed all pertinent labs within the past 24 hours.    Estimated Creatinine Clearance: 49.9 mL/min (A) (based on SCr of 2.1 mg/dL (H)).    Diagnostic Results:  I have reviewed and interpreted all pertinent imaging results/findings within the past 24 hours.    Assessment and Plan:     55 Y/O M with Hx of stage D HFrEF (EF 10%) due to NICM underwent HM2 implant 3/8/18 and exchange of outflow graft 3/9/18, Hx VT/VF s/p SICD 2014 presenting with gradual worsening shortness of breath associated with nonpleuritic, nonradiating bilateral 4/10 retrosternal chest pressure pain.  Symptoms began yesterday and have gradually worsened in the last 12 hours.  He does report going to a family picnic with increased consumption of chicken wings, ribs and other salty meat products.  Prior to symptom onset, he reports nausea, nonbloody diarrhea began yesterday and single episode of nonbloody nonbilious vomiting this morning. He also complains of dizziness, lightheadedness, and a mild HA. SOB worsened this morning prompting him to come to the ED.     In the ED, patient was in respiratory distress requiring BiPAP. MAP 96 and started on Nitro gtt. Work-up revealed WBC 10, K 5.4, Cr 2.5 (baseline 1.9), BNP 1950 (baseline 200-300s), Bili 2.1, LDH 1058, and INR 3.2. Bedside TTE with severely reduced EF, AV not opening, septum bulging into RV. Given IV  Lasix 80 mg x1 with only 100-200 mL UOP and dark in color. HM2 interrogated and flows have been 8.5-9 L/min with no alarms or power surges. Cardiology consulted in the ED, dicussed with HTS, and decision made to admit to ICU for further mgmt.       * Left ventricular assist device (LVAD) complication  The patient presented with COVID and found to have an uptrending LDH with increased power.  He underwent HM III upgrade on 7/13/22  -Daily LDH   -Continue Heparin drip    Procedure: Device Interrogation Including analysis of device parameters  Current Settings: Ventricular Assist Device    TXP LVAD INTERROGATIONS 7/24/2022 7/24/2022 7/24/2022 7/24/2022 7/24/2022 7/24/2022 7/24/2022   Type HeartMate3 HeartMate3 HeartMate3 HeartMate3 HeartMate3 HeartMate3 HeartMate3   Flow 5.5 5.6 5.5 5.5 5.4 5.5 5.5   Speed 6400 6450 6400 6400 6400 6400 6400   PI 3.1 3.2 3.3 3.4 3.4 3.3 3.1   Power (Jackson) 5.5 5.5 5.5 5.5 5.5 5.6 5.5   LSL - - - 6000 6000 6000 6000   Low Flow Alarm - - - - - - -   Pulsatility Intermittent pulse Intermittent pulse Intermittent pulse Intermittent pulse Intermittent pulse Intermittent pulse Intermittent pulse     History of ventricular tachycardia  Patient experienced an episode of VT on 6/30 and experienced an ICD shock thought to be related to hypokalemia (K of 3.1 on 6/30)  - Aggressively replete K, keep K>4 and Mg>2  - Continue mexiletine PO, lidocaine gtt, and amiodarone gtt   - EP signed off and recommending Amiodarone and Mexilitine, so would try to wean Lidocaine gtt if able  - Continue to monitor on telemetry    COVID-19  -Previously hypoxic then recovered  -ID was consulted, recommended Remdesivir, course completed    Elevated serum lactate dehydrogenase (LDH)  S/p HM3 exchange for HM2 pump thrombosis  Trend LDH daily    amiodarone induced hypothyrodism  -Continue levothyroxine 112 mcg     CKD (chronic kidney disease), stage III  WAGNER on CKD  Patient's baseline is 1.5-2.0  - Nephrology is  following  - Avoiding nephrotoxic medications  - Continue to monitor  - Strict I/O's    Chronic combined systolic and diastolic heart failure  ADHF  Underwent HM III upgrade on 7/13/22, currently on VA ECMO  Currently on Epi gtt, Vasopressin, Norepi, and Angiotension II  Currently on Precedex gtt  Being treated for sepsis  Currently intubated and sedated   Chest tube removal, bronch, and old driveline removed 7/22    Jluis Nolen MD  Heart Transplant  [facility] - Surgical Intensive Care

## 2022-07-24 NOTE — ASSESSMENT & PLAN NOTE
Leukocytosis with associated fever post-decannulation. Blcx so far ngtd, resp cx unrevealing. Pt is at risk for fungal infection (prior lines, multiple surgeries). S/P HM3 with removal of old driveline. Up-escalated to meropenem due to acute decompensation.     Leukocytosis stable. Sputum cultures with K aerogenes.    · Continue meropenem for now.  · De-escalate meropenem to ceftriaxone 2 gm IV daily on 7/25.   · Continue vancomycin for now. Monitor vancomycin trough.

## 2022-07-24 NOTE — PROGRESS NOTES
[facility]  Critical Care - Surgery  Progress Note    Patient Name: Tim Richards  MRN: [id number]  Admission Date: 6/27/2022  Hospital Length of Stay: 27 days  Code Status: Full Code  Attending Provider: Yg Kaufman MD  Primary Care Provider: Deyanira Booth MD   Principal Problem: Left ventricular assist device (LVAD) complication    Subjective:     Hospital/ICU Course:  No notes on file    Interval History/Significant Events: Continues to have low grade fevers. V tach yesterday afternoon, lidocaine gtt resumed. Loren to 10 this morning given difficulty maintaining MAPs > 75 in the afternoon/overnight. Continues to have brown discharge from the new VAD site.     Follow-up For: Procedure(s) (LRB):  REMOVAL, FOREIGN BODY (N/A)    Post-Operative Day: 2 Days Post-Op    Objective:     Vital Signs (Most Recent):  Temp: 100.22 °F (37.9 °C) (07/24/22 0700)  Pulse: 79 (07/24/22 0700)  Resp: (!) 28 (07/24/22 0749)  BP: (!) 75/0 (07/24/22 0315)  SpO2: 99 % (07/24/22 0315)   Vital Signs (24h Range):  Temp:  [100.04 °F (37.8 °C)-100.94 °F (38.3 °C)] 100.22 °F (37.9 °C)  Pulse:  [] 79  Resp:  [20-32] 28  SpO2:  [98 %-99 %] 99 %  BP: (75)/(0) 75/0  Arterial Line BP: (75-94)/(58-85) 82/72     Weight: 102.1 kg (225 lb)  Body mass index is 29.69 kg/m².      Intake/Output Summary (Last 24 hours) at 7/24/2022 0816  Last data filed at 7/24/2022 0800  Gross per 24 hour   Intake 4615.68 ml   Output 3150 ml   Net 1465.68 ml       Physical Exam  Constitutional:       Comments: Intubated and sedated   HENT:      Head: Normocephalic and atraumatic.      Mouth/Throat:      Comments: OG in place  Eyes:      Pupils: Pupils are equal, round, and reactive to light.   Neck:      Comments: R IJ CVC    Cardiovascular:      Rate and Rhythm: Normal rate. Rhythm irregular.      Comments:   LVAD in place -- 6400 rpm, 5.0L    Midline sternotomy c/d with dressing in place    V pacing wires in place.   Pulmonary:       Comments: Mechanically ventilated  Abdominal:      General: There is no distension.      Palpations: Abdomen is soft.      Comments: Prior driveline site packed with iodoform gauze   Genitourinary:     Comments: Lagunas in place draining clear urine  Musculoskeletal:      Comments: ECMO cannula sites with dressing in place.     Cutdown incision with seroma with wound vac in place. Minimal output    right radial arterial line   Skin:     General: Skin is warm and dry.   Neurological:      Comments: Following commands when sedation paused       Vents:  Vent Mode: A/C (07/23/22 1733)  Ventilator Initiated: Yes (07/15/22 1027)  Set Rate: 16 BPM (07/23/22 1733)  Vt Set: 550 mL (07/23/22 1733)  Pressure Support: 8 cmH20 (07/22/22 0821)  PEEP/CPAP: 5 cmH20 (07/23/22 1733)  Oxygen Concentration (%): 50 (07/24/22 0700)  Peak Airway Pressure: 32 cmH2O (07/23/22 1733)  Plateau Pressure: 21 cmH20 (07/23/22 1733)  Total Ve: 13.7 mL (07/23/22 1733)  Negative Inspiratory Force (cm H2O): 0 (07/23/22 1733)  F/VT Ratio<105 (RSBI): (!) 37.04 (07/22/22 1315)    Lines/Drains/Airways       Central Venous Catheter Line  Duration             Percutaneous Central Line Insertion/Assessment - Quad Lumen  07/21/22 1515 right internal jugular 2 days              Drain  Duration                  Urethral Catheter 07/13/22 0848 Temperature probe;Latex 14 Fr. 10 days         NG/OG Tube 07/15/22 1030 Left nostril 8 days              Airway  Duration                  Airway - Non-Surgical 07/13/22 0848 10 days              Arterial Line  Duration             Arterial Line 07/13/22 2000 Right Radial 10 days              Line  Duration                  VAD 07/13/22 1300 Left ventricular assist device HeartMate 3 10 days              Peripheral Intravenous Line  Duration                  Midline Catheter Insertion/Assessment  - Single Lumen 07/21/22 1616 Left basilic vein (medial side of arm) 18g x 10cm 2 days                    Significant  Labs:    CBC/Anemia Profile:  Recent Labs   Lab 07/23/22  1645 07/23/22 2001 07/23/22 2006 07/24/22 0132 07/24/22  0352   WBC 29.88* 28.14*  --   --  28.10*   HGB 7.8* 7.5*  --   --  7.4*   HCT 24.5* 23.1* 23* 24* 23.1*    330  --   --  332   MCV 93 91  --   --  92   RDW 18.2* 18.2*  --   --  18.6*        Chemistries:  Recent Labs   Lab 07/23/22  1645 07/23/22 2001 07/24/22  0352    143 144   K 3.6 3.6 4.2    109 111*   CO2 22* 24 26   BUN 40* 41* 44*   CREATININE 2.1* 2.1* 2.1*   CALCIUM 9.1 9.1 10.1   ALBUMIN 1.7* 1.7* 1.9*   PROT 6.3 6.1 6.8   BILITOT 11.2* 11.3* 11.4*   ALKPHOS 102 101 108   ALT 33 34 35   AST 51* 55* 58*   MG 2.7* 2.6 2.8*   PHOS 3.1 3.1 2.9       All pertinent labs within the past 24 hours have been reviewed.    Significant Imaging:  I have reviewed all pertinent imaging results/findings within the past 24 hours.    Assessment/Plan:     Chronic combined systolic and diastolic heart failure  Tim Richards is a 56 y.o. male HFrEF with an EF of 10% and a history of HM2 LVAD in 2018 who is now s/p HM3 upgrade, initiation of V-A ECMO after failure to wean off bypass x2      Neuro/Psych:   -- Sedation: Propofol/Precedex.  -- Pain: PRN Dilaudid             Cards:   -- S/P LVAD exchange on 7/14/22  --Improving pressor requirements, wean gwen for MAP > 75, keep other pressors the same   Epi 0.04   Levo 0.04   Vaso 0.04   Giapreza 10  -- Continue Midodrine 15 mg Q8  -- Stress dose steroids complete  -- Ventricular pacing wires.  -- MAP goal 75  -- VAD  flows stable  -- Decannulated on 7/19  -- Sternal closure on 7/15  -- Bedside drive line removal on 7/23  -- EP consult due to v-tach, continue mexiletine   -- Discontinue lidocaine, continue amio ggt with addition of amio 400 BID      Pulm:   -- Goal O2 sat > 90%  -- ABG PRN  -- Chest tubes removed  -- Nitric at 10 ppm; Goal to wean to off by Monday  -- PAV as tolerated with goal to extubate tomorrow.  -- Trach consult;  tentatively planning for Thursday next week      Renal:  -- Keep sun for strict I/O  -- Trend BUN/Cr  -- Lasix ggt 0.5/hr      FEN / GI:   -- Replace lytes as needed  -- Nutrition: NPO, TF   -- GI ppx: PPI  -- Bowel reg: miralax, docusate, go lytely      ID:   -- Tm: febrile; WBC 27 from 28  -- ABX miguel, vanc  -- Cultures sent, repeat cultures sent 7/22, no growth to date  -- BAL GNR  -- ID consult  -- daily vanc levels  -- Line exchange 7/21      Heme/Onc:   -- H/H stable   -- Daily CBC  -- Received 18 pRBCs, 16 FFP, 2 plt, 25 cryo; 1500 albumin intra-op  -- Heparin gtt at 1600, do not titrate       Endo:   -- BG goal 140-180  -- Insulin gtt  -- Levothyroxine      PPx:   Feeding: NPO, TF  Analgesia/Sedation: Precedex / PRN Dilaudid  Thromboembolic prevention: SCDs, heparin gtt  HOB >30: Yes  Stress Ulcer ppx: PPI  Glucose control: Critical care goal 140-180 g/dl, ISS     Lines/Drains/Airway: ETT; OG; RIJ CVC, r-radial a-line, sun; WV, VAD; midline      Dispo/Code Status/Palliative:   -- SICU / Full Code.              Hardeep Biswas MD  Critical Care - Surgery  [facility]

## 2022-07-24 NOTE — PLAN OF CARE
Weaning levophed for map >75  Tolerating Tube feeds, increased rate to 20cc/hr  Following commands, moving all extremities  Possible extubation in am

## 2022-07-24 NOTE — PROGRESS NOTES
Pharmacokinetic Assessment Follow Up: IV Vancomycin    Vancomycin Regimen Assessment/Plan:     - Vancomycin random level from AM labs resulted as 12.2 mg/dl, drawn ~ 17 hours post dose.  Goal 10-20 mg/dl.   - Patient with WAGNER on CKD3.  Scr 2.1 mg/dL - trending down; continue pulse dosing.   - Administer Vancomycin 1000 mg IV x 1 dose.   - A random level is ordered with AM labs tomorrow to assess clearance.  Pharmacy to re-dose based on level and renal function.     Drug levels (last 3 results):  Recent Labs   Lab Result Units 07/22/22  0455 07/23/22  0459 07/24/22  0459   Vancomycin, Random ug/mL 6.5 9.2 12.2       Pharmacy will continue to follow and monitor vancomycin.    Please contact pharmacy at extension [phone] for questions regarding this assessment.    Thank you for the consult,   Latricia Elizabeth       Patient brief summary:  Tim Richards is a 56 y.o. male initiated on antimicrobial therapy with IV Vancomycin for treatment of sepsis    The patient's current regimen is pulse dosing.    Drug Allergies:   Review of patient's allergies indicates:   Allergen Reactions    Lisinopril Anaphylaxis    Hydralazine analogues      Chronic constipation, impotence, dizziness       Actual Body Weight:   102.1 kg    Renal Function:   Estimated Creatinine Clearance: 49.9 mL/min (A) (based on SCr of 2.1 mg/dL (H)).,     Dialysis Method (if applicable):  N/A    CBC (last 72 hours):  Recent Labs   Lab Result Units 07/21/22  1217 07/21/22  1918 07/22/22  0317 07/22/22  1224 07/22/22  1958 07/23/22  0402 07/23/22  1048 07/23/22  1645 07/23/22 2001 07/24/22  0352   WBC K/uL 34.57* 31.19* 31.70* 28.94* 28.79* 27.23* 28.70* 29.88* 28.14* 28.10*   Hemoglobin g/dL 7.9* 7.3* 7.4* 6.8* 6.9* 6.6* 7.6* 7.8* 7.5* 7.4*   Hematocrit % 25.9* 23.6* 23.8* 21.6* 22.2* 20.5* 23.7* 24.5* 23.1* 23.1*   Platelets K/uL 226 249 265 258 287 312 318 334 330 332   Gran % % 88.8* 87.7* 86.1* 88.6* 86.8* 85.0* 85.8* 86.6* 83.0* 87.2*   Lymph % % 2.3*  3.1* 4.1* 2.5* 3.7* 4.0* 3.4* 3.5* 5.0* 3.2*   Mono % % 5.9 6.5 6.5 5.8 6.3 6.8 6.7 6.6 4.0 6.3   Eosinophil % % 0.1 0.1 0.1 0.1 0.1 0.1 0.1 0.1 0.0 0.2   Basophil % % 0.2 0.1 0.1 0.1 0.1 0.1 0.1 0.1 0.0 0.1   Differential Method  Automated Automated Automated Automated Automated Automated Automated Automated Automated Automated       Metabolic Panel (last 72 hours):  Recent Labs   Lab Result Units 07/21/22  1217 07/21/22  1918 07/22/22  0317 07/22/22  0858 07/22/22  1509 07/22/22  1958 07/23/22  0402 07/23/22  1048 07/23/22  1645 07/23/22  2001 07/24/22  0352   Sodium mmol/L 148* 146* 144 143 142 142 141 143 143 143 144   Potassium mmol/L 3.7 3.6 3.8 4.1 3.7 3.9 3.5 3.8 3.6 3.6 4.2   Chloride mmol/L 113* 112* 110 109 109 109 107 110 109 109 111*   CO2 mmol/L 25 24 22* 24 21* 22* 22* 23 22* 24 26   Glucose mg/dL 146* 123* 123* 141* 112* 111* 112* 110 116* 118* 143*   BUN mg/dL 56* 52* 53* 50* 49* 47* 45* 44* 40* 41* 44*   Creatinine mg/dL 2.7* 2.4* 2.6* 2.8* 2.6* 2.4* 2.3* 2.3* 2.1* 2.1* 2.1*   Albumin g/dL 1.9* 1.8* 1.8*  1.8* 1.8* 1.7* 1.7* 1.7* 1.7* 1.7* 1.7* 1.9*   Total Bilirubin mg/dL 14.6* 13.1* 12.9*  12.9* 13.5* 10.9* 10.5* 10.7* 11.0* 11.2* 11.3* 11.4*   Alkaline Phosphatase U/L 131 114 120  120 118 104 105 102 102 102 101 108   AST U/L 66* 57* 57*  57* 59* 49* 53* 49* 50* 51* 55* 58*   ALT U/L 45* 41 38  38 41 35 34 33 31 33 34 35   Magnesium mg/dL 3.1* 2.9* 2.9* 2.9* 2.6 2.7* 2.8* 2.7* 2.7* 2.6 2.8*   Phosphorus mg/dL 4.0 3.6 3.8 4.1 3.4 3.5 3.4 3.4 3.1 3.1 2.9       Vancomycin Administrations:  vancomycin given in the last 96 hours                   vancomycin in dextrose 5 % 1 gram/250 mL IVPB 1,000 mg (mg) 1,000 mg New Bag 07/23/22 1114    vancomycin 750 mg in dextrose 5 % 250 mL IVPB (ready to mix system) (mg) 750 mg New Bag 07/22/22 0813    vancomycin 1.25 g in dextrose 5% 250 mL IVPB (ready to mix) (mg) 1,250 mg New Bag 07/20/22 1454                Microbiologic Results:  Microbiology Results  (last 7 days)     Procedure Component Value Units Date/Time    Blood culture [[id number]] Collected: 07/20/22 1445    Order Status: Completed Specimen: Blood from Peripheral, Forearm, Left Updated: 07/23/22 1612     Blood Culture, Routine No Growth to date      No Growth to date      No Growth to date      No Growth to date    Blood culture [[id number]] Collected: 07/20/22 1450    Order Status: Completed Specimen: Blood from Wrist, Left Updated: 07/23/22 1612     Blood Culture, Routine No Growth to date      No Growth to date      No Growth to date      No Growth to date    Culture, VAP (BAL) [[id number]]  (Abnormal)  (Susceptibility) Collected: 07/20/22 1120    Order Status: Completed Specimen: Bronchial Alveolar Lavage from BAL, RML Updated: 07/23/22 1048     VAP BAL CULTURE KLEBSIELLA AEROGENES  Few  Normal respiratory sachin also present       Gram Stain (Respiratory) Few WBC's     Gram Stain (Respiratory) Rare Gram positive cocci     Gram Stain (Respiratory) Rare Gram negative rods    Blood culture [[id number]] Collected: 07/22/22 0857    Order Status: Completed Specimen: Blood from Peripheral, Antecubital, Right Updated: 07/23/22 1012     Blood Culture, Routine No Growth to date      No Growth to date    Blood culture [[id number]] Collected: 07/22/22 0918    Order Status: Completed Specimen: Blood from Peripheral, Hand, Left Updated: 07/23/22 1012     Blood Culture, Routine No Growth to date      No Growth to date    Blood culture [[id number]]     Order Status: Canceled Specimen: Blood     Blood culture [[id number]]     Order Status: Canceled Specimen: Blood

## 2022-07-24 NOTE — NURSING
MD Shae notified of VS, gtts, CVP, ABG, labs, VAD numbers, Marilia, vent settings, I & O, increasing ectopy/runs of vtach.  Orders received and implemented to restart lidocaine gtt at 0.5 mg/min., increase giapreza gtt to 10 ng/kg/min., 20 mEq KCl IVPB.

## 2022-07-24 NOTE — ASSESSMENT & PLAN NOTE
Tim Richards is a 56 y.o. male HFrEF with an EF of 10% and a history of HM2 LVAD in 2018 who is now s/p HM3 upgrade, initiation of V-A ECMO after failure to wean off bypass x2      Neuro/Psych:   -- Sedation: Propofol/Precedex.  -- Pain: PRN Dilaudid             Cards:   -- S/P LVAD exchange on 7/14/22  --Improving pressor requirements, wean gwen for MAP > 75, keep other pressors the same   Epi 0.04   Levo 0.04   Vaso 0.04   Giapreza 10  -- Continue Midodrine 15 mg Q8  -- Stress dose steroids complete  -- Ventricular pacing wires.  -- MAP goal 75  -- VAD  flows stable  -- Decannulated on 7/19  -- Sternal closure on 7/15  -- Bedside drive line removal on 7/23  -- EP consult due to v-tach, continue mexiletine   -- Discontinue lidocaine, continue amio ggt with addition of amio 400 BID      Pulm:   -- Goal O2 sat > 90%  -- ABG PRN  -- Chest tubes removed  -- Nitric at 10 ppm; Goal to wean to off by Monday  -- PAV as tolerated with goal to extubate tomorrow.  -- Trach consult; tentatively planning for Thursday next week      Renal:  -- Keep sun for strict I/O  -- Trend BUN/Cr  -- Lasix ggt 0.5/hr      FEN / GI:   -- Replace lytes as needed  -- Nutrition: NPO, TF   -- GI ppx: PPI  -- Bowel reg: miralax, docusate, go lytely      ID:   -- Tm: febrile; WBC 27 from 28  -- ABX miguel, vanc  -- Cultures sent, repeat cultures sent 7/22, no growth to date  -- BAL GNR  -- ID consult  -- daily vanc levels  -- Line exchange 7/21      Heme/Onc:   -- H/H stable   -- Daily CBC  -- Received 18 pRBCs, 16 FFP, 2 plt, 25 cryo; 1500 albumin intra-op  -- Heparin gtt at 1600, do not titrate       Endo:   -- BG goal 140-180  -- Insulin gtt  -- Levothyroxine      PPx:   Feeding: NPO, TF  Analgesia/Sedation: Precedex / PRN Dilaudid  Thromboembolic prevention: SCDs, heparin gtt  HOB >30: Yes  Stress Ulcer ppx: PPI  Glucose control: Critical care goal 140-180 g/dl, ISS     Lines/Drains/Airway: ETT; OG; RIJ CVC, r-radial a-line,  corinne; WV, VAD; midline      Dispo/Code Status/Palliative:   -- SICU / Full Code.

## 2022-07-24 NOTE — PROGRESS NOTES
07/24/2022  Casper Harris    Current provider:  Yg Kaufman MD    Device interrogation:  TXP LVAD INTERROGATIONS 7/24/2022 7/24/2022 7/24/2022 7/24/2022 7/24/2022 7/24/2022 7/24/2022   Type HeartMate3 HeartMate3 HeartMate3 HeartMate3 HeartMate3 HeartMate3 HeartMate3   Flow 5.5 5.6 5.5 5.5 5.4 5.5 5.5   Speed 6400 6450 6400 6400 6400 6400 6400   PI 3.1 3.2 3.3 3.4 3.4 3.3 3.1   Power (Jackson) 5.5 5.5 5.5 5.5 5.5 5.6 5.5   LSL - - - 6000 6000 6000 6000   Low Flow Alarm - - - - - - -   Pulsatility Intermittent pulse Intermittent pulse Intermittent pulse Intermittent pulse Intermittent pulse Intermittent pulse Intermittent pulse          Rounded on Tim Richards to ensure all mechanical assist device settings (IABP or VAD) were appropriate and all parameters were within limits.  I was able to ensure all back up equipment was present, the staff had no issues, and the Perfusion Department daily rounding was complete.      For implantable VADs: Interrogation of Ventricular assist device was performed with analysis of device parameters and review of device function. I have personally reviewed the interrogation findings and agree with findings as stated.     In emergency, the nursing units have been notified to contact the perfusion department either by:  Calling [phone] from 630am to 4pm Mon thru Fri, utilizing the On-Call Finder functionality of Epic and searching for Perfusion, or by contacting the hospital  from 4pm to 630am and on weekends and asking to speak with the perfusionist on call.    10:51 AM

## 2022-07-24 NOTE — PROGRESS NOTES
Notified Dr. Kaufman of uop 60, and map 68-71.  titrating gtts for map > 75 and 500cc 1/2 NS bolus admin iv.

## 2022-07-24 NOTE — ASSESSMENT & PLAN NOTE
ADHF  Underwent HM III upgrade on 7/13/22, currently on VA ECMO  Currently on Epi gtt, Vasopressin, Norepi, and Angiotension II  Currently on Precedex gtt  Being treated for sepsis  Currently intubated and sedated   Chest tube removal, bronch, and old driveline removed 7/22

## 2022-07-24 NOTE — PROGRESS NOTES
Cardiac Surgery Progress Note     Tmax 100.9  Slowly weaning pressors     Gtts  Epi 0.03, levo 0.04, gwen 8, vaso 0.04     A/P  s/p redo LVAD     Slowly wean nitric to off by Monday  Wean pressors  Increase tube feeds to 20  Wean lidocaine tomorrow and start 400 PO amio BID  PAV for goal of extubation on Monday  Continue broad spectrum antibiotics      Vidhi Nicolas MD, PGY-7  Cardiothoracic Surgery   [phone]  '

## 2022-07-24 NOTE — PROGRESS NOTES
Dr. Kaufman at bedside.  Md aware of all gtts, rate, uop, vs, and HM3 driveline dsg with brown/yellow drainage.

## 2022-07-24 NOTE — ASSESSMENT & PLAN NOTE
Procedure: Device Interrogation Including analysis of device parameters  Current Settings: Ventricular Assist Device  Review of device function is stable    TXP LVAD INTERROGATIONS 7/24/2022 7/24/2022 7/24/2022 7/24/2022 7/24/2022 7/24/2022 7/24/2022   Type HeartMate3 HeartMate3 HeartMate3 HeartMate3 HeartMate3 HeartMate3 HeartMate3   Flow 5.5 5.6 5.5 5.5 5.4 5.5 5.5   Speed 6400 6450 6400 6400 6400 6400 6400   PI 3.1 3.2 3.3 3.4 3.4 3.3 3.1   Power (Jackson) 5.5 5.5 5.5 5.5 5.5 5.6 5.5   LSL - - - 6000 6000 6000 6000   Low Flow Alarm - - - - - - -   Pulsatility Intermittent pulse Intermittent pulse Intermittent pulse Intermittent pulse Intermittent pulse Intermittent pulse Intermittent pulse

## 2022-07-24 NOTE — SUBJECTIVE & OBJECTIVE
Interval History/Significant Events: Continues to have low grade fevers. V tach yesterday afternoon, lidocaine gtt resumed. Loren to 10 this morning given difficulty maintaining MAPs > 75 in the afternoon/overnight. Continues to have brown discharge from the new VAD site.     Follow-up For: Procedure(s) (LRB):  REMOVAL, FOREIGN BODY (N/A)    Post-Operative Day: 2 Days Post-Op    Objective:     Vital Signs (Most Recent):  Temp: 100.22 °F (37.9 °C) (07/24/22 0700)  Pulse: 79 (07/24/22 0700)  Resp: (!) 28 (07/24/22 0749)  BP: (!) 75/0 (07/24/22 0315)  SpO2: 99 % (07/24/22 0315)   Vital Signs (24h Range):  Temp:  [100.04 °F (37.8 °C)-100.94 °F (38.3 °C)] 100.22 °F (37.9 °C)  Pulse:  [] 79  Resp:  [20-32] 28  SpO2:  [98 %-99 %] 99 %  BP: (75)/(0) 75/0  Arterial Line BP: (75-94)/(58-85) 82/72     Weight: 102.1 kg (225 lb)  Body mass index is 29.69 kg/m².      Intake/Output Summary (Last 24 hours) at 7/24/2022 0816  Last data filed at 7/24/2022 0800  Gross per 24 hour   Intake 4615.68 ml   Output 3150 ml   Net 1465.68 ml       Physical Exam  Constitutional:       Comments: Intubated and sedated   HENT:      Head: Normocephalic and atraumatic.      Mouth/Throat:      Comments: OG in place  Eyes:      Pupils: Pupils are equal, round, and reactive to light.   Neck:      Comments: R IJ CVC    Cardiovascular:      Rate and Rhythm: Normal rate. Rhythm irregular.      Comments:   LVAD in place -- 6400 rpm, 5.0L    Midline sternotomy c/d with dressing in place    V pacing wires in place.   Pulmonary:      Comments: Mechanically ventilated  Abdominal:      General: There is no distension.      Palpations: Abdomen is soft.      Comments: Prior driveline site packed with iodoform gauze   Genitourinary:     Comments: Lagunas in place draining clear urine  Musculoskeletal:      Comments: ECMO cannula sites with dressing in place.     Cutdown incision with seroma with wound vac in place. Minimal output    right radial arterial line    Skin:     General: Skin is warm and dry.   Neurological:      Comments: Following commands when sedation paused       Vents:  Vent Mode: A/C (07/23/22 1733)  Ventilator Initiated: Yes (07/15/22 1027)  Set Rate: 16 BPM (07/23/22 1733)  Vt Set: 550 mL (07/23/22 1733)  Pressure Support: 8 cmH20 (07/22/22 0821)  PEEP/CPAP: 5 cmH20 (07/23/22 1733)  Oxygen Concentration (%): 50 (07/24/22 0700)  Peak Airway Pressure: 32 cmH2O (07/23/22 1733)  Plateau Pressure: 21 cmH20 (07/23/22 1733)  Total Ve: 13.7 mL (07/23/22 1733)  Negative Inspiratory Force (cm H2O): 0 (07/23/22 1733)  F/VT Ratio<105 (RSBI): (!) 37.04 (07/22/22 1315)    Lines/Drains/Airways       Central Venous Catheter Line  Duration             Percutaneous Central Line Insertion/Assessment - Quad Lumen  07/21/22 1515 right internal jugular 2 days              Drain  Duration                  Urethral Catheter 07/13/22 0848 Temperature probe;Latex 14 Fr. 10 days         NG/OG Tube 07/15/22 1030 Left nostril 8 days              Airway  Duration                  Airway - Non-Surgical 07/13/22 0848 10 days              Arterial Line  Duration             Arterial Line 07/13/22 2000 Right Radial 10 days              Line  Duration                  VAD 07/13/22 1300 Left ventricular assist device HeartMate 3 10 days              Peripheral Intravenous Line  Duration                  Midline Catheter Insertion/Assessment  - Single Lumen 07/21/22 1616 Left basilic vein (medial side of arm) 18g x 10cm 2 days                    Significant Labs:    CBC/Anemia Profile:  Recent Labs   Lab 07/23/22  1645 07/23/22 2001 07/23/22 2006 07/24/22  0132 07/24/22  0352   WBC 29.88* 28.14*  --   --  28.10*   HGB 7.8* 7.5*  --   --  7.4*   HCT 24.5* 23.1* 23* 24* 23.1*    330  --   --  332   MCV 93 91  --   --  92   RDW 18.2* 18.2*  --   --  18.6*        Chemistries:  Recent Labs   Lab 07/23/22  1645 07/23/22 2001 07/24/22  0352    143 144   K 3.6 3.6 4.2    109  111*   CO2 22* 24 26   BUN 40* 41* 44*   CREATININE 2.1* 2.1* 2.1*   CALCIUM 9.1 9.1 10.1   ALBUMIN 1.7* 1.7* 1.9*   PROT 6.3 6.1 6.8   BILITOT 11.2* 11.3* 11.4*   ALKPHOS 102 101 108   ALT 33 34 35   AST 51* 55* 58*   MG 2.7* 2.6 2.8*   PHOS 3.1 3.1 2.9       All pertinent labs within the past 24 hours have been reviewed.    Significant Imaging:  I have reviewed all pertinent imaging results/findings within the past 24 hours.

## 2022-07-25 LAB
ALBUMIN SERPL BCP-MCNC: 2 G/DL (ref 3.5–5.2)
ALBUMIN SERPL BCP-MCNC: 2.1 G/DL (ref 3.5–5.2)
ALBUMIN SERPL BCP-MCNC: 2.1 G/DL (ref 3.5–5.2)
ALLENS TEST: ABNORMAL
ALLENS TEST: NORMAL
ALP SERPL-CCNC: 111 U/L (ref 55–135)
ALP SERPL-CCNC: 111 U/L (ref 55–135)
ALP SERPL-CCNC: 116 U/L (ref 55–135)
ALT SERPL W/O P-5'-P-CCNC: 37 U/L (ref 10–44)
ALT SERPL W/O P-5'-P-CCNC: 37 U/L (ref 10–44)
ALT SERPL W/O P-5'-P-CCNC: 38 U/L (ref 10–44)
ANION GAP SERPL CALC-SCNC: 11 MMOL/L (ref 8–16)
ANION GAP SERPL CALC-SCNC: 7 MMOL/L (ref 8–16)
ANION GAP SERPL CALC-SCNC: 9 MMOL/L (ref 8–16)
ANION GAP SERPL CALC-SCNC: 9 MMOL/L (ref 8–16)
ANISOCYTOSIS BLD QL SMEAR: SLIGHT
APTT BLDCRRT: 29.6 SEC (ref 21–32)
APTT BLDCRRT: 29.6 SEC (ref 21–32)
APTT BLDCRRT: 30.8 SEC (ref 21–32)
APTT BLDCRRT: 33 SEC (ref 21–32)
AST SERPL-CCNC: 66 U/L (ref 10–40)
AST SERPL-CCNC: 69 U/L (ref 10–40)
AST SERPL-CCNC: 69 U/L (ref 10–40)
AST SERPL-CCNC: 74 U/L (ref 10–40)
AST SERPL-CCNC: 74 U/L (ref 10–40)
BACTERIA BLD CULT: NORMAL
BACTERIA BLD CULT: NORMAL
BASOPHILS # BLD AUTO: 0.01 K/UL (ref 0–0.2)
BASOPHILS # BLD AUTO: 0.03 K/UL (ref 0–0.2)
BASOPHILS # BLD AUTO: 0.03 K/UL (ref 0–0.2)
BASOPHILS NFR BLD: 0 % (ref 0–1.9)
BASOPHILS NFR BLD: 0.1 % (ref 0–1.9)
BASOPHILS NFR BLD: 0.1 % (ref 0–1.9)
BILIRUB DIRECT SERPL-MCNC: 8.7 MG/DL (ref 0.1–0.3)
BILIRUB SERPL-MCNC: 10.4 MG/DL (ref 0.1–1)
BILIRUB SERPL-MCNC: 11.1 MG/DL (ref 0.1–1)
BILIRUB SERPL-MCNC: 11.1 MG/DL (ref 0.1–1)
BILIRUB SERPL-MCNC: 11.3 MG/DL (ref 0.1–1)
BILIRUB SERPL-MCNC: 11.3 MG/DL (ref 0.1–1)
BNP SERPL-MCNC: 2301 PG/ML (ref 0–99)
BUN SERPL-MCNC: 33 MG/DL (ref 6–20)
BUN SERPL-MCNC: 34 MG/DL (ref 6–20)
BUN SERPL-MCNC: 34 MG/DL (ref 6–20)
BUN SERPL-MCNC: 35 MG/DL (ref 6–20)
CA-I BLDV-SCNC: 1.22 MMOL/L (ref 1.06–1.42)
CALCIUM SERPL-MCNC: 9.1 MG/DL (ref 8.7–10.5)
CALCIUM SERPL-MCNC: 9.2 MG/DL (ref 8.7–10.5)
CALCIUM SERPL-MCNC: 9.2 MG/DL (ref 8.7–10.5)
CALCIUM SERPL-MCNC: 9.5 MG/DL (ref 8.7–10.5)
CHLORIDE SERPL-SCNC: 105 MMOL/L (ref 95–110)
CHLORIDE SERPL-SCNC: 106 MMOL/L (ref 95–110)
CHLORIDE SERPL-SCNC: 107 MMOL/L (ref 95–110)
CHLORIDE SERPL-SCNC: 107 MMOL/L (ref 95–110)
CO2 SERPL-SCNC: 21 MMOL/L (ref 23–29)
CO2 SERPL-SCNC: 23 MMOL/L (ref 23–29)
CO2 SERPL-SCNC: 23 MMOL/L (ref 23–29)
CO2 SERPL-SCNC: 25 MMOL/L (ref 23–29)
CREAT SERPL-MCNC: 1.6 MG/DL (ref 0.5–1.4)
CREAT SERPL-MCNC: 1.6 MG/DL (ref 0.5–1.4)
CREAT SERPL-MCNC: 1.7 MG/DL (ref 0.5–1.4)
CREAT SERPL-MCNC: 1.7 MG/DL (ref 0.5–1.4)
CRP SERPL-MCNC: 134.6 MG/L (ref 0–8.2)
DELSYS: ABNORMAL
DELSYS: NORMAL
DIFFERENTIAL METHOD: ABNORMAL
EOSINOPHIL # BLD AUTO: 0 K/UL (ref 0–0.5)
EOSINOPHIL # BLD AUTO: 0.1 K/UL (ref 0–0.5)
EOSINOPHIL # BLD AUTO: 0.1 K/UL (ref 0–0.5)
EOSINOPHIL NFR BLD: 0.2 % (ref 0–8)
EOSINOPHIL NFR BLD: 0.3 % (ref 0–8)
EOSINOPHIL NFR BLD: 0.4 % (ref 0–8)
ERYTHROCYTE [DISTWIDTH] IN BLOOD BY AUTOMATED COUNT: 19.2 % (ref 11.5–14.5)
ERYTHROCYTE [DISTWIDTH] IN BLOOD BY AUTOMATED COUNT: 19.3 % (ref 11.5–14.5)
ERYTHROCYTE [DISTWIDTH] IN BLOOD BY AUTOMATED COUNT: 20.3 % (ref 11.5–14.5)
ERYTHROCYTE [SEDIMENTATION RATE] IN BLOOD BY WESTERGREN METHOD: 16 MM/H
EST. GFR  (AFRICAN AMERICAN): 51.3 ML/MIN/1.73 M^2
EST. GFR  (AFRICAN AMERICAN): 51.3 ML/MIN/1.73 M^2
EST. GFR  (AFRICAN AMERICAN): 55.2 ML/MIN/1.73 M^2
EST. GFR  (AFRICAN AMERICAN): 55.2 ML/MIN/1.73 M^2
EST. GFR  (NON AFRICAN AMERICAN): 44.4 ML/MIN/1.73 M^2
EST. GFR  (NON AFRICAN AMERICAN): 44.4 ML/MIN/1.73 M^2
EST. GFR  (NON AFRICAN AMERICAN): 47.8 ML/MIN/1.73 M^2
EST. GFR  (NON AFRICAN AMERICAN): 47.8 ML/MIN/1.73 M^2
FIBRINOGEN PPP-MCNC: >800 MG/DL (ref 182–400)
FIO2: 50
GIANT PLATELETS BLD QL SMEAR: PRESENT
GLUCOSE SERPL-MCNC: 108 MG/DL (ref 70–110)
GLUCOSE SERPL-MCNC: 108 MG/DL (ref 70–110)
GLUCOSE SERPL-MCNC: 120 MG/DL (ref 70–110)
GLUCOSE SERPL-MCNC: 132 MG/DL (ref 70–110)
HCO3 UR-SCNC: 24.6 MMOL/L (ref 24–28)
HCO3 UR-SCNC: 24.7 MMOL/L (ref 24–28)
HCO3 UR-SCNC: 24.9 MMOL/L (ref 24–28)
HCO3 UR-SCNC: 26.3 MMOL/L (ref 24–28)
HCT VFR BLD AUTO: 22.6 % (ref 40–54)
HCT VFR BLD AUTO: 22.6 % (ref 40–54)
HCT VFR BLD AUTO: 23.5 % (ref 40–54)
HCT VFR BLD CALC: 22 %PCV (ref 36–54)
HCT VFR BLD CALC: 22 %PCV (ref 36–54)
HCT VFR BLD CALC: 23 %PCV (ref 36–54)
HCT VFR BLD CALC: 24 %PCV (ref 36–54)
HGB BLD-MCNC: 7 G/DL (ref 14–18)
HGB BLD-MCNC: 7 G/DL (ref 14–18)
HGB BLD-MCNC: 7.2 G/DL (ref 14–18)
HYPOCHROMIA BLD QL SMEAR: ABNORMAL
IMM GRANULOCYTES # BLD AUTO: 0.74 K/UL (ref 0–0.04)
IMM GRANULOCYTES # BLD AUTO: 0.77 K/UL (ref 0–0.04)
IMM GRANULOCYTES # BLD AUTO: 0.89 K/UL (ref 0–0.04)
IMM GRANULOCYTES NFR BLD AUTO: 3.3 % (ref 0–0.5)
IMM GRANULOCYTES NFR BLD AUTO: 3.5 % (ref 0–0.5)
IMM GRANULOCYTES NFR BLD AUTO: 3.6 % (ref 0–0.5)
INR PPP: 1.1 (ref 0.8–1.2)
LDH SERPL L TO P-CCNC: 0.58 MMOL/L (ref 0.36–1.25)
LDH SERPL L TO P-CCNC: 0.62 MMOL/L (ref 0.36–1.25)
LDH SERPL L TO P-CCNC: 0.7 MMOL/L (ref 0.36–1.25)
LDH SERPL L TO P-CCNC: 0.71 MMOL/L (ref 0.36–1.25)
LDH SERPL L TO P-CCNC: 381 U/L (ref 110–260)
LYMPHOCYTES # BLD AUTO: 0.6 K/UL (ref 1–4.8)
LYMPHOCYTES # BLD AUTO: 0.7 K/UL (ref 1–4.8)
LYMPHOCYTES # BLD AUTO: 0.9 K/UL (ref 1–4.8)
LYMPHOCYTES NFR BLD: 2.8 % (ref 18–48)
LYMPHOCYTES NFR BLD: 3.4 % (ref 18–48)
LYMPHOCYTES NFR BLD: 3.5 % (ref 18–48)
MAGNESIUM SERPL-MCNC: 2.3 MG/DL (ref 1.6–2.6)
MAGNESIUM SERPL-MCNC: 2.6 MG/DL (ref 1.6–2.6)
MAGNESIUM SERPL-MCNC: 2.8 MG/DL (ref 1.6–2.6)
MCH RBC QN AUTO: 28.8 PG (ref 27–31)
MCH RBC QN AUTO: 28.9 PG (ref 27–31)
MCH RBC QN AUTO: 29.7 PG (ref 27–31)
MCHC RBC AUTO-ENTMCNC: 30.6 G/DL (ref 32–36)
MCHC RBC AUTO-ENTMCNC: 31 G/DL (ref 32–36)
MCHC RBC AUTO-ENTMCNC: 31 G/DL (ref 32–36)
MCV RBC AUTO: 93 FL (ref 82–98)
MCV RBC AUTO: 94 FL (ref 82–98)
MCV RBC AUTO: 96 FL (ref 82–98)
METHEMOGLOBIN: 0.6 % (ref 0–3)
MODE: ABNORMAL
MODE: NORMAL
MONOCYTES # BLD AUTO: 1.5 K/UL (ref 0.3–1)
MONOCYTES # BLD AUTO: 1.6 K/UL (ref 0.3–1)
MONOCYTES # BLD AUTO: 1.6 K/UL (ref 0.3–1)
MONOCYTES NFR BLD: 6.1 % (ref 4–15)
MONOCYTES NFR BLD: 6.9 % (ref 4–15)
MONOCYTES NFR BLD: 7.5 % (ref 4–15)
NEUTROPHILS # BLD AUTO: 18.3 K/UL (ref 1.8–7.7)
NEUTROPHILS # BLD AUTO: 19.9 K/UL (ref 1.8–7.7)
NEUTROPHILS # BLD AUTO: 21.4 K/UL (ref 1.8–7.7)
NEUTROPHILS NFR BLD: 85.4 % (ref 38–73)
NEUTROPHILS NFR BLD: 86.3 % (ref 38–73)
NEUTROPHILS NFR BLD: 86.6 % (ref 38–73)
NRBC BLD-RTO: 0 /100 WBC
NRBC BLD-RTO: 0 /100 WBC
NRBC BLD-RTO: 1 /100 WBC
OVALOCYTES BLD QL SMEAR: ABNORMAL
PCO2 BLDA: 38.4 MMHG (ref 35–45)
PCO2 BLDA: 40 MMHG (ref 35–45)
PCO2 BLDA: 43.9 MMHG (ref 35–45)
PCO2 BLDA: 47 MMHG (ref 35–45)
PEEP: 5
PH SMN: 7.36 [PH] (ref 7.35–7.45)
PH SMN: 7.36 [PH] (ref 7.35–7.45)
PH SMN: 7.4 [PH] (ref 7.35–7.45)
PH SMN: 7.42 [PH] (ref 7.35–7.45)
PHOSPHATE SERPL-MCNC: 2.8 MG/DL (ref 2.7–4.5)
PHOSPHATE SERPL-MCNC: 3 MG/DL (ref 2.7–4.5)
PHOSPHATE SERPL-MCNC: 3 MG/DL (ref 2.7–4.5)
PLATELET # BLD AUTO: 362 K/UL (ref 150–450)
PLATELET # BLD AUTO: 372 K/UL (ref 150–450)
PLATELET # BLD AUTO: 379 K/UL (ref 150–450)
PMV BLD AUTO: 12 FL (ref 9.2–12.9)
PMV BLD AUTO: 12.3 FL (ref 9.2–12.9)
PMV BLD AUTO: 12.4 FL (ref 9.2–12.9)
PO2 BLDA: 103 MMHG (ref 80–100)
PO2 BLDA: 115 MMHG (ref 80–100)
PO2 BLDA: 120 MMHG (ref 80–100)
PO2 BLDA: 79 MMHG (ref 80–100)
POC BE: -1 MMOL/L
POC BE: 0 MMOL/L
POC BE: 0 MMOL/L
POC BE: 1 MMOL/L
POC IONIZED CALCIUM: 1.21 MMOL/L (ref 1.06–1.42)
POC IONIZED CALCIUM: 1.23 MMOL/L (ref 1.06–1.42)
POC IONIZED CALCIUM: 1.25 MMOL/L (ref 1.06–1.42)
POC IONIZED CALCIUM: 1.27 MMOL/L (ref 1.06–1.42)
POC SATURATED O2: 96 % (ref 95–100)
POC SATURATED O2: 98 % (ref 95–100)
POC TCO2: 26 MMOL/L (ref 23–27)
POC TCO2: 28 MMOL/L (ref 23–27)
POIKILOCYTOSIS BLD QL SMEAR: SLIGHT
POLYCHROMASIA BLD QL SMEAR: ABNORMAL
POTASSIUM BLD-SCNC: 3.9 MMOL/L (ref 3.5–5.1)
POTASSIUM BLD-SCNC: 4 MMOL/L (ref 3.5–5.1)
POTASSIUM BLD-SCNC: 4.1 MMOL/L (ref 3.5–5.1)
POTASSIUM BLD-SCNC: 4.2 MMOL/L (ref 3.5–5.1)
POTASSIUM SERPL-SCNC: 3.8 MMOL/L (ref 3.5–5.1)
POTASSIUM SERPL-SCNC: 3.9 MMOL/L (ref 3.5–5.1)
POTASSIUM SERPL-SCNC: 4 MMOL/L (ref 3.5–5.1)
POTASSIUM SERPL-SCNC: 4.2 MMOL/L (ref 3.5–5.1)
POTASSIUM SERPL-SCNC: 4.2 MMOL/L (ref 3.5–5.1)
PROT SERPL-MCNC: 6.3 G/DL (ref 6–8.4)
PROT SERPL-MCNC: 6.4 G/DL (ref 6–8.4)
PROTHROMBIN TIME: 11 SEC (ref 9–12.5)
PROTHROMBIN TIME: 11.4 SEC (ref 9–12.5)
PROTHROMBIN TIME: 11.4 SEC (ref 9–12.5)
RBC # BLD AUTO: 2.36 M/UL (ref 4.6–6.2)
RBC # BLD AUTO: 2.43 M/UL (ref 4.6–6.2)
RBC # BLD AUTO: 2.49 M/UL (ref 4.6–6.2)
SAMPLE: ABNORMAL
SAMPLE: NORMAL
SITE: ABNORMAL
SITE: NORMAL
SODIUM BLD-SCNC: 141 MMOL/L (ref 136–145)
SODIUM BLD-SCNC: 142 MMOL/L (ref 136–145)
SODIUM BLD-SCNC: 142 MMOL/L (ref 136–145)
SODIUM BLD-SCNC: 143 MMOL/L (ref 136–145)
SODIUM SERPL-SCNC: 137 MMOL/L (ref 136–145)
SODIUM SERPL-SCNC: 138 MMOL/L (ref 136–145)
SODIUM SERPL-SCNC: 139 MMOL/L (ref 136–145)
SODIUM SERPL-SCNC: 139 MMOL/L (ref 136–145)
TARGETS BLD QL SMEAR: ABNORMAL
VANCOMYCIN SERPL-MCNC: 6.9 UG/ML
VT: 420
VT: 450
WBC # BLD AUTO: 21.39 K/UL (ref 3.9–12.7)
WBC # BLD AUTO: 23.04 K/UL (ref 3.9–12.7)
WBC # BLD AUTO: 24.84 K/UL (ref 3.9–12.7)

## 2022-07-25 PROCEDURE — 93750 INTERROGATION VAD IN PERSON: CPT | Mod: ,,, | Performed by: INTERNAL MEDICINE

## 2022-07-25 PROCEDURE — 85730 THROMBOPLASTIN TIME PARTIAL: CPT | Mod: 91 | Performed by: THORACIC SURGERY (CARDIOTHORACIC VASCULAR SURGERY)

## 2022-07-25 PROCEDURE — 63600175 PHARM REV CODE 636 W HCPCS

## 2022-07-25 PROCEDURE — 83605 ASSAY OF LACTIC ACID: CPT

## 2022-07-25 PROCEDURE — 25000003 PHARM REV CODE 250

## 2022-07-25 PROCEDURE — P9045 ALBUMIN (HUMAN), 5%, 250 ML: HCPCS | Mod: JG | Performed by: THORACIC SURGERY (CARDIOTHORACIC VASCULAR SURGERY)

## 2022-07-25 PROCEDURE — 82800 BLOOD PH: CPT

## 2022-07-25 PROCEDURE — 85384 FIBRINOGEN ACTIVITY: CPT | Performed by: THORACIC SURGERY (CARDIOTHORACIC VASCULAR SURGERY)

## 2022-07-25 PROCEDURE — 20000000 HC ICU ROOM

## 2022-07-25 PROCEDURE — 99233 SBSQ HOSP IP/OBS HIGH 50: CPT | Mod: ,,, | Performed by: INTERNAL MEDICINE

## 2022-07-25 PROCEDURE — 27200966 HC CLOSED SUCTION SYSTEM

## 2022-07-25 PROCEDURE — 80053 COMPREHEN METABOLIC PANEL: CPT | Mod: 91 | Performed by: THORACIC SURGERY (CARDIOTHORACIC VASCULAR SURGERY)

## 2022-07-25 PROCEDURE — 80202 ASSAY OF VANCOMYCIN: CPT | Performed by: PHYSICIAN ASSISTANT

## 2022-07-25 PROCEDURE — 36620 INSERTION CATHETER ARTERY: CPT

## 2022-07-25 PROCEDURE — 99291 PR CRITICAL CARE, E/M 30-74 MINUTES: ICD-10-PCS | Mod: ,,, | Performed by: INTERNAL MEDICINE

## 2022-07-25 PROCEDURE — 63600175 PHARM REV CODE 636 W HCPCS: Performed by: STUDENT IN AN ORGANIZED HEALTH CARE EDUCATION/TRAINING PROGRAM

## 2022-07-25 PROCEDURE — 27000685 HC LVAD KIT (30 DAY SUPPLY)

## 2022-07-25 PROCEDURE — 80076 HEPATIC FUNCTION PANEL: CPT | Performed by: STUDENT IN AN ORGANIZED HEALTH CARE EDUCATION/TRAINING PROGRAM

## 2022-07-25 PROCEDURE — 25000003 PHARM REV CODE 250: Performed by: STUDENT IN AN ORGANIZED HEALTH CARE EDUCATION/TRAINING PROGRAM

## 2022-07-25 PROCEDURE — 99231 SBSQ HOSP IP/OBS SF/LOW 25: CPT | Mod: ,,, | Performed by: INTERNAL MEDICINE

## 2022-07-25 PROCEDURE — 99233 PR SUBSEQUENT HOSPITAL CARE,LEVL III: ICD-10-PCS | Mod: ,,, | Performed by: INTERNAL MEDICINE

## 2022-07-25 PROCEDURE — 27000248 HC VAD-ADDITIONAL DAY

## 2022-07-25 PROCEDURE — 37799 UNLISTED PX VASCULAR SURGERY: CPT

## 2022-07-25 PROCEDURE — 84132 ASSAY OF SERUM POTASSIUM: CPT

## 2022-07-25 PROCEDURE — 85730 THROMBOPLASTIN TIME PARTIAL: CPT | Performed by: THORACIC SURGERY (CARDIOTHORACIC VASCULAR SURGERY)

## 2022-07-25 PROCEDURE — 94003 VENT MGMT INPAT SUBQ DAY: CPT

## 2022-07-25 PROCEDURE — 85025 COMPLETE CBC W/AUTO DIFF WBC: CPT | Performed by: THORACIC SURGERY (CARDIOTHORACIC VASCULAR SURGERY)

## 2022-07-25 PROCEDURE — 82565 ASSAY OF CREATININE: CPT

## 2022-07-25 PROCEDURE — 99291 CRITICAL CARE FIRST HOUR: CPT | Mod: ,,, | Performed by: INTERNAL MEDICINE

## 2022-07-25 PROCEDURE — 83615 LACTATE (LD) (LDH) ENZYME: CPT | Performed by: STUDENT IN AN ORGANIZED HEALTH CARE EDUCATION/TRAINING PROGRAM

## 2022-07-25 PROCEDURE — C9113 INJ PANTOPRAZOLE SODIUM, VIA: HCPCS

## 2022-07-25 PROCEDURE — 27000221 HC OXYGEN, UP TO 24 HOURS

## 2022-07-25 PROCEDURE — 82330 ASSAY OF CALCIUM: CPT

## 2022-07-25 PROCEDURE — 25000003 PHARM REV CODE 250: Performed by: THORACIC SURGERY (CARDIOTHORACIC VASCULAR SURGERY)

## 2022-07-25 PROCEDURE — 99231 PR SUBSEQUENT HOSPITAL CARE,LEVL I: ICD-10-PCS | Mod: ,,, | Performed by: INTERNAL MEDICINE

## 2022-07-25 PROCEDURE — 99223 1ST HOSP IP/OBS HIGH 75: CPT | Mod: ,,, | Performed by: ANESTHESIOLOGY

## 2022-07-25 PROCEDURE — 87205 SMEAR GRAM STAIN: CPT | Performed by: THORACIC SURGERY (CARDIOTHORACIC VASCULAR SURGERY)

## 2022-07-25 PROCEDURE — 84100 ASSAY OF PHOSPHORUS: CPT | Mod: 91 | Performed by: THORACIC SURGERY (CARDIOTHORACIC VASCULAR SURGERY)

## 2022-07-25 PROCEDURE — 83050 HGB METHEMOGLOBIN QUAN: CPT

## 2022-07-25 PROCEDURE — 82330 ASSAY OF CALCIUM: CPT | Performed by: THORACIC SURGERY (CARDIOTHORACIC VASCULAR SURGERY)

## 2022-07-25 PROCEDURE — 82803 BLOOD GASES ANY COMBINATION: CPT

## 2022-07-25 PROCEDURE — P9045 ALBUMIN (HUMAN), 5%, 250 ML: HCPCS | Mod: JG | Performed by: STUDENT IN AN ORGANIZED HEALTH CARE EDUCATION/TRAINING PROGRAM

## 2022-07-25 PROCEDURE — 99900026 HC AIRWAY MAINTENANCE (STAT)

## 2022-07-25 PROCEDURE — 25000003 PHARM REV CODE 250: Performed by: PHYSICIAN ASSISTANT

## 2022-07-25 PROCEDURE — 84295 ASSAY OF SERUM SODIUM: CPT

## 2022-07-25 PROCEDURE — 86140 C-REACTIVE PROTEIN: CPT | Performed by: STUDENT IN AN ORGANIZED HEALTH CARE EDUCATION/TRAINING PROGRAM

## 2022-07-25 PROCEDURE — 94761 N-INVAS EAR/PLS OXIMETRY MLT: CPT

## 2022-07-25 PROCEDURE — 63600367 HC NITRIC OXIDE PER HOUR

## 2022-07-25 PROCEDURE — 99900035 HC TECH TIME PER 15 MIN (STAT)

## 2022-07-25 PROCEDURE — 94640 AIRWAY INHALATION TREATMENT: CPT

## 2022-07-25 PROCEDURE — 83735 ASSAY OF MAGNESIUM: CPT | Mod: 91 | Performed by: THORACIC SURGERY (CARDIOTHORACIC VASCULAR SURGERY)

## 2022-07-25 PROCEDURE — 87070 CULTURE OTHR SPECIMN AEROBIC: CPT | Performed by: THORACIC SURGERY (CARDIOTHORACIC VASCULAR SURGERY)

## 2022-07-25 PROCEDURE — 84132 ASSAY OF SERUM POTASSIUM: CPT | Performed by: THORACIC SURGERY (CARDIOTHORACIC VASCULAR SURGERY)

## 2022-07-25 PROCEDURE — 93750 PR INTERROGATE VENT ASSIST DEV, IN PERSON, W PHYSICIAN ANALYSIS: ICD-10-PCS | Mod: ,,, | Performed by: INTERNAL MEDICINE

## 2022-07-25 PROCEDURE — 85014 HEMATOCRIT: CPT

## 2022-07-25 PROCEDURE — 87075 CULTR BACTERIA EXCEPT BLOOD: CPT | Performed by: THORACIC SURGERY (CARDIOTHORACIC VASCULAR SURGERY)

## 2022-07-25 PROCEDURE — 85610 PROTHROMBIN TIME: CPT | Performed by: THORACIC SURGERY (CARDIOTHORACIC VASCULAR SURGERY)

## 2022-07-25 PROCEDURE — 99223 PR INITIAL HOSPITAL CARE,LEVL III: ICD-10-PCS | Mod: ,,, | Performed by: ANESTHESIOLOGY

## 2022-07-25 PROCEDURE — 63600175 PHARM REV CODE 636 W HCPCS: Performed by: THORACIC SURGERY (CARDIOTHORACIC VASCULAR SURGERY)

## 2022-07-25 PROCEDURE — 83880 ASSAY OF NATRIURETIC PEPTIDE: CPT | Performed by: STUDENT IN AN ORGANIZED HEALTH CARE EDUCATION/TRAINING PROGRAM

## 2022-07-25 PROCEDURE — 25000242 PHARM REV CODE 250 ALT 637 W/ HCPCS: Performed by: STUDENT IN AN ORGANIZED HEALTH CARE EDUCATION/TRAINING PROGRAM

## 2022-07-25 RX ORDER — ALBUMIN HUMAN 50 G/1000ML
12.5 SOLUTION INTRAVENOUS ONCE
Status: COMPLETED | OUTPATIENT
Start: 2022-07-25 | End: 2022-07-26

## 2022-07-25 RX ORDER — MAGNESIUM SULFATE HEPTAHYDRATE 40 MG/ML
2 INJECTION, SOLUTION INTRAVENOUS ONCE
Status: COMPLETED | OUTPATIENT
Start: 2022-07-25 | End: 2022-07-26

## 2022-07-25 RX ORDER — POTASSIUM CHLORIDE 14.9 MG/ML
20 INJECTION INTRAVENOUS ONCE
Status: COMPLETED | OUTPATIENT
Start: 2022-07-25 | End: 2022-07-25

## 2022-07-25 RX ORDER — CEFEPIME HYDROCHLORIDE 1 G/50ML
2 INJECTION, SOLUTION INTRAVENOUS
Status: DISCONTINUED | OUTPATIENT
Start: 2022-07-25 | End: 2022-08-09

## 2022-07-25 RX ORDER — WARFARIN 2 MG/1
2 TABLET ORAL DAILY
Status: DISCONTINUED | OUTPATIENT
Start: 2022-07-25 | End: 2022-07-26

## 2022-07-25 RX ORDER — ALBUMIN HUMAN 50 G/1000ML
SOLUTION INTRAVENOUS
Status: DISPENSED
Start: 2022-07-25 | End: 2022-07-26

## 2022-07-25 RX ORDER — CALCIUM GLUCONATE 20 MG/ML
1 INJECTION, SOLUTION INTRAVENOUS ONCE
Status: COMPLETED | OUTPATIENT
Start: 2022-07-25 | End: 2022-07-25

## 2022-07-25 RX ORDER — ALBUMIN HUMAN 50 G/1000ML
12.5 SOLUTION INTRAVENOUS ONCE
Status: COMPLETED | OUTPATIENT
Start: 2022-07-25 | End: 2022-07-25

## 2022-07-25 RX ORDER — POTASSIUM CHLORIDE 29.8 MG/ML
40 INJECTION INTRAVENOUS ONCE
Status: COMPLETED | OUTPATIENT
Start: 2022-07-25 | End: 2022-07-25

## 2022-07-25 RX ORDER — POTASSIUM CHLORIDE 29.8 MG/ML
40 INJECTION INTRAVENOUS ONCE
Status: COMPLETED | OUTPATIENT
Start: 2022-07-25 | End: 2022-07-26

## 2022-07-25 RX ADMIN — POTASSIUM CHLORIDE 40 MEQ: 29.8 INJECTION, SOLUTION INTRAVENOUS at 09:07

## 2022-07-25 RX ADMIN — AMIODARONE HYDROCHLORIDE 0.5 MG/MIN: 1.8 INJECTION, SOLUTION INTRAVENOUS at 12:07

## 2022-07-25 RX ADMIN — PANTOPRAZOLE SODIUM 40 MG: 40 INJECTION, POWDER, FOR SOLUTION INTRAVENOUS at 08:07

## 2022-07-25 RX ADMIN — DEXMEDETOMIDINE HYDROCHLORIDE 0.4 MCG/KG/HR: 4 INJECTION INTRAVENOUS at 03:07

## 2022-07-25 RX ADMIN — HEPARIN SODIUM 1800 UNITS/HR: 5000 INJECTION INTRAVENOUS; SUBCUTANEOUS at 01:07

## 2022-07-25 RX ADMIN — WARFARIN SODIUM 2 MG: 2 TABLET ORAL at 05:07

## 2022-07-25 RX ADMIN — AMIODARONE HYDROCHLORIDE 0.5 MG/MIN: 1.8 INJECTION, SOLUTION INTRAVENOUS at 11:07

## 2022-07-25 RX ADMIN — EPINEPHRINE 0.02 MCG/KG/MIN: 1 INJECTION INTRAMUSCULAR; INTRAVENOUS; SUBCUTANEOUS at 03:07

## 2022-07-25 RX ADMIN — HYDROMORPHONE HYDROCHLORIDE 1 MG: 1 INJECTION, SOLUTION INTRAMUSCULAR; INTRAVENOUS; SUBCUTANEOUS at 02:07

## 2022-07-25 RX ADMIN — AMIODARONE HYDROCHLORIDE 1 MG/MIN: 1.8 INJECTION, SOLUTION INTRAVENOUS at 01:07

## 2022-07-25 RX ADMIN — ALBUTEROL SULFATE 2.5 MG: 2.5 SOLUTION RESPIRATORY (INHALATION) at 01:07

## 2022-07-25 RX ADMIN — LEVOTHYROXINE SODIUM 112 MCG: 112 TABLET ORAL at 06:07

## 2022-07-25 RX ADMIN — CALCIUM GLUCONATE 1 G: 20 INJECTION, SOLUTION INTRAVENOUS at 01:07

## 2022-07-25 RX ADMIN — MAGNESIUM SULFATE HEPTAHYDRATE 2 G: 40 INJECTION, SOLUTION INTRAVENOUS at 10:07

## 2022-07-25 RX ADMIN — POTASSIUM CHLORIDE 20 MEQ: 14.9 INJECTION, SOLUTION INTRAVENOUS at 05:07

## 2022-07-25 RX ADMIN — SODIUM CHLORIDE: 0.9 INJECTION, SOLUTION INTRAVENOUS at 02:07

## 2022-07-25 RX ADMIN — ACETYLCYSTEINE 4 ML: 100 INHALANT RESPIRATORY (INHALATION) at 01:07

## 2022-07-25 RX ADMIN — ANGIOTENSIN II 5 NG/KG/MIN: 2.5 INJECTION INTRAVENOUS at 11:07

## 2022-07-25 RX ADMIN — ALBUTEROL SULFATE 2.5 MG: 2.5 SOLUTION RESPIRATORY (INHALATION) at 08:07

## 2022-07-25 RX ADMIN — GUAIFENESIN 300 MG: 100 SOLUTION ORAL at 11:07

## 2022-07-25 RX ADMIN — HEPARIN SODIUM 1800 UNITS/HR: 5000 INJECTION INTRAVENOUS; SUBCUTANEOUS at 04:07

## 2022-07-25 RX ADMIN — GUAIFENESIN 300 MG: 100 SOLUTION ORAL at 06:07

## 2022-07-25 RX ADMIN — ACETYLCYSTEINE 4 ML: 100 INHALANT RESPIRATORY (INHALATION) at 08:07

## 2022-07-25 RX ADMIN — DEXMEDETOMIDINE HYDROCHLORIDE 1.4 MCG/KG/HR: 4 INJECTION INTRAVENOUS at 01:07

## 2022-07-25 RX ADMIN — VASOPRESSIN 0.04 UNITS/MIN: 20 INJECTION INTRAVENOUS at 03:07

## 2022-07-25 RX ADMIN — ACETYLCYSTEINE 4 ML: 100 INHALANT RESPIRATORY (INHALATION) at 07:07

## 2022-07-25 RX ADMIN — ALBUTEROL SULFATE 2.5 MG: 2.5 SOLUTION RESPIRATORY (INHALATION) at 07:07

## 2022-07-25 RX ADMIN — DEXMEDETOMIDINE HYDROCHLORIDE 0.8 MCG/KG/HR: 4 INJECTION INTRAVENOUS at 10:07

## 2022-07-25 RX ADMIN — MIDODRINE HYDROCHLORIDE 15 MG: 5 TABLET ORAL at 01:07

## 2022-07-25 RX ADMIN — GUAIFENESIN 300 MG: 100 SOLUTION ORAL at 05:07

## 2022-07-25 RX ADMIN — MIDODRINE HYDROCHLORIDE 15 MG: 5 TABLET ORAL at 09:07

## 2022-07-25 RX ADMIN — NOREPINEPHRINE BITARTRATE 0.01 MCG/KG/MIN: 8 INJECTION, SOLUTION INTRAVENOUS at 12:07

## 2022-07-25 RX ADMIN — LIDOCAINE HYDROCHLORIDE 1 MG/MIN: 8 INJECTION, SOLUTION INTRAVENOUS at 03:07

## 2022-07-25 RX ADMIN — AMIODARONE HYDROCHLORIDE 400 MG: 200 TABLET ORAL at 08:07

## 2022-07-25 RX ADMIN — POTASSIUM CHLORIDE 20 MEQ: 14.9 INJECTION, SOLUTION INTRAVENOUS at 02:07

## 2022-07-25 RX ADMIN — POTASSIUM CHLORIDE 20 MEQ: 14.9 INJECTION, SOLUTION INTRAVENOUS at 10:07

## 2022-07-25 RX ADMIN — CEFEPIME 2 G: 2 INJECTION, POWDER, FOR SOLUTION INTRAVENOUS at 07:07

## 2022-07-25 RX ADMIN — MEROPENEM 2 G: 1 INJECTION INTRAVENOUS at 08:07

## 2022-07-25 RX ADMIN — ASPIRIN 81 MG CHEWABLE TABLET 81 MG: 81 TABLET CHEWABLE at 08:07

## 2022-07-25 RX ADMIN — ALBUMIN (HUMAN) 12.5 G: 12.5 SOLUTION INTRAVENOUS at 11:07

## 2022-07-25 RX ADMIN — HYDROMORPHONE HYDROCHLORIDE 1 MG: 1 INJECTION, SOLUTION INTRAMUSCULAR; INTRAVENOUS; SUBCUTANEOUS at 05:07

## 2022-07-25 RX ADMIN — MIDODRINE HYDROCHLORIDE 15 MG: 5 TABLET ORAL at 06:07

## 2022-07-25 RX ADMIN — DEXMEDETOMIDINE HYDROCHLORIDE 1.4 MCG/KG/HR: 4 INJECTION INTRAVENOUS at 03:07

## 2022-07-25 RX ADMIN — POTASSIUM CHLORIDE 40 MEQ: 29.8 INJECTION, SOLUTION INTRAVENOUS at 05:07

## 2022-07-25 RX ADMIN — PROPOFOL 20 MCG/KG/MIN: 10 INJECTION, EMULSION INTRAVENOUS at 02:07

## 2022-07-25 RX ADMIN — ALBUMIN (HUMAN) 12.5 G: 12.5 SOLUTION INTRAVENOUS at 09:07

## 2022-07-25 NOTE — ASSESSMENT & PLAN NOTE
The patient presented with COVID and found to have an uptrending LDH with increased power.  He underwent HM III upgrade on 7/13/22  -Daily LDH   -Continue Heparin drip    Procedure: Device Interrogation Including analysis of device parameters  Current Settings: Ventricular Assist Device    TXP LVAD INTERROGATIONS 7/25/2022 7/25/2022 7/25/2022 7/25/2022 7/25/2022 7/25/2022 7/25/2022   Type HeartMate3 HeartMate3 HeartMate3 HeartMate3 HeartMate3 HeartMate3 HeartMate3   Flow 5.3 5.2 5.4 5.5 5.5 5.4 5.3   Speed 6400 6400 6400 6400 6400 6400 6400   PI 3.5 3.6 3.4 3.3 3.5 3.4 3.5   Power (Jackson) 5.4 5.4 5.5 5.5 5.5 5.6 5.4   LSL 6000 6000 6000 6000 6000 6000 6000   Low Flow Alarm - - - - - - -   Pulsatility - - - - - Intermittent pulse Intermittent pulse

## 2022-07-25 NOTE — PROGRESS NOTES
[facility]  Critical Care - Surgery  Progress Note    Patient Name: Tim Richards  MRN: [id number]  Admission Date: 6/27/2022  Hospital Length of Stay: 28 days  Code Status: Full Code  Attending Provider: Yg Kaufman MD  Primary Care Provider: Deyanira Booth MD   Principal Problem: Left ventricular assist device (LVAD) complication    Subjective:     Hospital/ICU Course:  No notes on file    Interval History/Significant Events: Short runs of vtach yesterday. Push of lidocaine given and drip increased. Mexiletine discontinued as well as levo. Epi decreased to 0.02. Amio transitioned from PO to gtt. Continues to be stable on giapreza 16 and vaso 0.04.    Follow-up For: Procedure(s) (LRB):  REMOVAL, FOREIGN BODY (N/A)    Post-Operative Day: 3 Days Post-Op    Objective:     Vital Signs (Most Recent):  Temp: 98.42 °F (36.9 °C) (07/25/22 0615)  Pulse: 67 (07/25/22 0615)  Resp: 20 (07/25/22 0537)  BP: (!) 75/0 (07/25/22 0315)  SpO2: 100 % (07/25/22 0508)   Vital Signs (24h Range):  Temp:  [98.06 °F (36.7 °C)-100.4 °F (38 °C)] 98.42 °F (36.9 °C)  Pulse:  [58-89] 67  Resp:  [20-28] 20  SpO2:  [99 %-100 %] 100 %  BP: (75-82)/(0) 75/0  Arterial Line BP: ()/() 81/72     Weight: 100.7 kg (222 lb)  Body mass index is 29.29 kg/m².      Intake/Output Summary (Last 24 hours) at 7/25/2022 0625  Last data filed at 7/25/2022 0600  Gross per 24 hour   Intake 4029.65 ml   Output 2595 ml   Net 1434.65 ml       Physical Exam  Constitutional:       Comments: Intubated and sedated   HENT:      Head: Normocephalic and atraumatic.      Mouth/Throat:      Comments: OG in place  Eyes:      Pupils: Pupils are equal, round, and reactive to light.   Neck:      Comments: R IJ CVC    Cardiovascular:      Rate and Rhythm: Normal rate. Rhythm irregular.      Comments: LVAD in place -- 6400 rpm, 5.0L    Midline sternotomy c/d with dressing in place      Pulmonary:      Comments: Mechanically  ventilated  Abdominal:      General: There is no distension.      Palpations: Abdomen is soft.      Comments: Prior driveline site packed with iodoform gauze   Genitourinary:     Comments: Lagunas in place draining clear urine  Musculoskeletal:      Comments: ECMO cannula sites with dressing in place.     Cutdown incision with seroma with wound vac in place. Minimal output    right radial arterial line   Skin:     General: Skin is warm and dry.   Neurological:      Comments: Following commands when sedation paused       Vents:  Vent Mode: A/C (07/25/22 0508)  Ventilator Initiated: Yes (07/15/22 1027)  Set Rate: 16 BPM (07/25/22 0508)  Vt Set: 450 mL (07/25/22 0508)  Pressure Support: 8 cmH20 (07/22/22 0821)  PEEP/CPAP: 5 cmH20 (07/25/22 0508)  Oxygen Concentration (%): 50 (07/25/22 0615)  Peak Airway Pressure: 24 cmH2O (07/25/22 0508)  Plateau Pressure: 21 cmH20 (07/25/22 0508)  Total Ve: 10.3 mL (07/25/22 0508)  Negative Inspiratory Force (cm H2O): 0 (07/25/22 0508)  F/VT Ratio<105 (RSBI): (!) 37.04 (07/22/22 1315)    Lines/Drains/Airways       Central Venous Catheter Line  Duration             Percutaneous Central Line Insertion/Assessment - Quad Lumen  07/21/22 1515 right internal jugular 3 days              Drain  Duration                  Urethral Catheter 07/13/22 0848 Temperature probe;Latex 14 Fr. 11 days         NG/OG Tube 07/15/22 1030 Left nostril 9 days              Airway  Duration                  Airway - Non-Surgical 07/13/22 0848 11 days              Arterial Line  Duration             Arterial Line 07/13/22 2000 Right Radial 11 days              Line  Duration                  VAD 07/13/22 1300 Left ventricular assist device HeartMate 3 11 days              Peripheral Intravenous Line  Duration                  Midline Catheter Insertion/Assessment  - Single Lumen 07/21/22 1616 Left basilic vein (medial side of arm) 18g x 10cm 3 days                    Significant Labs:    CBC/Anemia Profile:  Recent  Labs   Lab 07/24/22  1516 07/24/22 2010 07/24/22  2153 07/24/22  2221 07/25/22  0105 07/25/22  0401   WBC 28.19* 26.84*  --   --   --  24.84*   HGB 7.3* 7.3*  --   --   --  7.2*   HCT 22.8* 23.5*   < > 41 23* 23.5*    364  --   --   --  362   MCV 91 91  --   --   --  94   RDW 18.8* 19.0*  --   --   --  19.2*    < > = values in this interval not displayed.        Chemistries:  Recent Labs   Lab 07/24/22  1515 07/24/22 2010 07/25/22  0041 07/25/22  0401     139 141  --  138   K 3.9  3.9 3.6 4.0 3.9     107 108  --  106   CO2 21*  21* 23  --  21*   BUN 38*  38* 37*  --  35*   CREATININE 1.9*  1.9* 1.8*  --  1.7*   CALCIUM 9.2  9.2 9.1  --  9.5   ALBUMIN 1.7*  1.7* 1.7*  --  2.1*  2.1*   PROT 6.4  6.4 6.4  --  6.4  6.4   BILITOT 11.6*  11.6* 12.0*  --  11.3*  11.3*   ALKPHOS 108  108 115  --  111  111   ALT 36  36 38  --  37  37   AST 69*  69* 72*  --  69*  69*   MG 2.5  2.5 2.5  --  2.8*   PHOS 3.2  3.2 3.2  --  3.0       ABGs:   Recent Labs   Lab 07/25/22  0105   PH 7.357   PCO2 43.9   HCO3 24.7   POCSATURATED 98   BE -1     Coagulation:   Recent Labs   Lab 07/25/22  0401   INR 1.1   APTT 33.0*     Lactic Acid: No results for input(s): LACTATE in the last 48 hours.  All pertinent labs within the past 24 hours have been reviewed.    Significant Imaging:  I have reviewed all pertinent imaging results/findings within the past 24 hours.    Assessment/Plan:     Chronic combined systolic and diastolic heart failure  Tim Richards is a 56 y.o. male HFrEF with an EF of 10% and a history of HM2 LVAD in 2018 who is now s/p HM3 upgrade, initiation of V-A ECMO after failure to wean off bypass x2      Neuro/Psych:   -- Sedation: Propofol/Precedex.  -- Pain: PRN Dilaudid             Cards:   -- S/P LVAD exchange on 7/14/22  --Improving pressor requirements, wean gwen for MAP > 75, keep other pressors the same   Epi 0.02   Levo off   Vaso 0.04   Giapreza 16  -- Continue Midodrine 15 mg  Q8  -- Stress dose steroids complete  -- MAP goal 75  -- VAD  flows stable  -- Decannulated on 7/19  -- Sternal closure on 7/15  -- Bedside drive line removal on 7/23  -- EP consult due to v-tach, discontinue mexiletine   -- Continue lidocaine, continue amio ggt      Pulm:   -- Goal O2 sat > 90%  -- ABG PRN  -- Chest tubes removed  -- Nitric at 0.7 ppm; Goal to wean to off by Monday  -- PAV as tolerated with goal to extubate tomorrow.  -- Trach consult; tentatively planning for Thursday next week      Renal:  -- Keep sun for strict I/O  -- Trend BUN/Cr  -- Lasix ggt 1/hr      FEN / GI:   -- Replace lytes as needed  -- Nutrition: NPO, TF   -- GI ppx: PPI  -- Bowel reg: miralax, docusate, go lytely      ID:   -- Tm: febrile; WBC 24.8 from 27  -- ABX miugel, vanc  -- Cultures sent, repeat cultures sent 7/22, no growth to date  -- BAL GNR --> klebsiella   -- ID consult, recommending transitioning from miguel to ceftriaxone today  -- daily vanc levels  -- Line exchange 7/21      Heme/Onc:   -- H/H stable   -- Daily CBC  -- Received 18 pRBCs, 16 FFP, 2 plt, 25 cryo; 1500 albumin intra-op  -- Heparin gtt at 1800, do not titrate       Endo:   -- BG goal 140-180  -- SSI  -- Levothyroxine      PPx:   Feeding: NPO, TF  Analgesia/Sedation: Precedex / PRN Dilaudid  Thromboembolic prevention: SCDs, heparin gtt  HOB >30: Yes  Stress Ulcer ppx: PPI  Glucose control: Critical care goal 140-180 g/dl, ISS     Lines/Drains/Airway: ETT; OG; RIJ CVC, r-radial a-line, sun; WV, VAD; midline      Dispo/Code Status/Palliative:   -- SICU / Full Code.            Dang Amezcua MD  Critical Care - Surgery  [facility]

## 2022-07-25 NOTE — PROGRESS NOTES
[facility]  Adult Nutrition  Progress Note    SUMMARY       Recommendations    1) Continue tube feeds of Peptamen 1.5 with Prebio. As medically feasible, rec'd increasing to goal rate of 60 ml/hr to meet estimated kcal/protein needs - provides 2160 kcal, 98g protein, 1106mL water. Give Beneprotein QID to meet estimated protein needs; 1 packet = 25 kcal, 6g protein for a total of 2260 kcal/day, 122g protein/day.    -As pressor requirements increase, rec'd trickle feeds for best tolerance. Avoid holding TF for residuals less than 500mL unless associated with other s/s of intolerance such as N/V/D, abd pain/distention.     2) RD to monitor and f/u.    Goals: Continue to meet % EEN/EPN by RD f/u date  Nutrition Goal Status: progressing towards goal  Communication of RD Recs: other (POC)    Assessment and Plan  Nutrition Problem:  Moderate Protein-Calorie Malnutrition  Malnutrition in the context ofAcute Illness/Injury      Related to (etiology):  Inadequate energy intake, NPO, intubation      Signs and Symptoms (as evidenced by):  Energy Intake: <50% of estimated energy requirement x 5 days  Weight Loss: ~9% x <1 month  Fluid Accumulation: mild (1-2+ edema)     Interventions(treatment strategy):  Collaboration of nutrition care with other providers  Enteral nutrition      Nutrition Diagnosis Status:  Continues    Reason for Assessment    Reason For Assessment: RD follow-up  Diagnosis:  (LVAD complication, WAGNER)  Relevant Medical History: CHF, gout, cardiomyopathy  Interdisciplinary Rounds: did not attend    General Information Comments:   Pt remains intubated/sedated, +OG tube in place, tolerating TF at 20 ml/hr. No N/V/D or abd pain noted. Per chart review, wt fluctuating between 253-265# x 4 months prior to admission. Pt weighing 245# on admit. #. Possible ~8# wt loss prior to admission, and 23# (9%) wt loss during LOS (suspect further weight loss masked by edema; 1 to 2+ edema  "noted at this time).  Pt meets criteria for moderate malnutrition in the context of acute illness/injury - see PES statement for details.     Nutrition Discharge Planning: cardiac/low sodium diet at d/c    Nutrition Risk Screen    Nutrition Risk Screen: no indicators present    Nutrition/Diet History    Spiritual, Cultural Beliefs, Latter-day Practices, Values that Affect Care: no  Food Allergies: NKFA    Anthropometrics    Temp: 98.96 °F (37.2 °C)  Height Method: Stated  Height: 6' 1" (185.4 cm)  Height (inches): 73 in  Weight Method: Bed Scale  Weight: 100.7 kg (222 lb)  Weight (lb): 222 lb  Ideal Body Weight (IBW), Male: 184 lb  % Ideal Body Weight, Male (lb): 127.72 %  BMI (Calculated): 29.3  BMI Grade: 25 - 29.9 - overweight    Lab/Procedures/Meds    Pertinent Labs Reviewed: reviewed  Pertinent Labs Comments: BUN 35, Crea 1.7, GFR 51.3, Glu 132, Mag 2.8, Alb 2.1  Pertinent Medications Reviewed: reviewed  Pertinent Medications Comments: dexmedetomidine, epinephrine, vasopressin, KCl, pantoprazole, furosemide    Estimated/Assessed Needs    Weight Used For Calorie Calculations: 100.7 kg (222 lb 0.1 oz)  Energy Calorie Requirements (kcal): 2172 kcal/day  Energy Need Method: [facility]  Protein Requirements: 121-151g pro/day (1.2-1.5 g/kg)  Weight Used For Protein Calculations: 100.7 kg (222 lb 0.1 oz)  Fluid Requirements (mL): 1 ml or fluid per MD  RDA Method (mL): 2172    Nutrition Prescription Ordered    Current Diet Order: NPO  Current Nutrition Support Formula Ordered: Peptamen 1.5 w/Prebio  Current Nutrition Support Rate Ordered: 20 (ml)  Current Nutrition Support Frequency Ordered: ml/hr x 24hr    Evaluation of Received Nutrient/Fluid Intake    Enteral Calories (kcal): 720  Enteral Protein (gm): 33  Enteral (Free Water) Fluid (mL): 369  % Kcal Needs: 33%  % Protein Needs: 27%  I/O: +7.4L since 7/11  Energy Calories Required: not meeting needs  Protein Required: not meeting needs  Fluid Required: not meeting " needs  Total Fluid Intake (mL/kg): -  Comments: LBM: 7/22  Tolerance: tolerating  % Intake of Estimated Energy Needs: 25 - 50 %  % Meal Intake: NPO    Nutrition Risk    Level of Risk/Frequency of Follow-up:  (1x/week)     Monitor and Evaluation    Food and Nutrient Intake: energy intake, food and beverage intake, enteral nutrition intake  Food and Nutrient Adminstration: diet order, enteral and parenteral nutrition administration  Knowledge/Beliefs/Attitudes: food and nutrition knowledge/skill  Physical Activity and Function: nutrition-related ADLs and IADLs  Anthropometric Measurements: weight, weight change  Biochemical Data, Medical Tests and Procedures: electrolyte and renal panel, gastrointestinal profile, glucose/endocrine profile, inflammatory profile, lipid profile  Nutrition-Focused Physical Findings: overall appearance, extremities, muscles and bones     Nutrition Follow-Up    RD Follow-up?: Yes

## 2022-07-25 NOTE — NURSING
1944-  MD Shae notified of multiple runs of vtach, most lasting > 1 min.  Orders received and implemented for 100 mg IVP lidocaine, increase lidocaine gtt to 1 mg/min., call EP.    2012-  MD Kandace with EP at bedside.    2032-  MD Shae on phone and POC reviewed with MD Kandace.  Orders received and implemented to D/C mexalatine, 10 mg IVP lasix, increase lasix gtt to 1 mg/hr., start amiodarone gtt at 1 mg/min., decrease epinephrine gtt to 0.02 mcg/kg/min., continue with po amiodarone, call EP staff, call back at 2200 with update.    2123-  MD Kandace called and stated he spoke with MD Sumeet, who recommends the following:  Start amiodarone gtt at 1 mg/min. x6h, then decrease to 0.5 mg/min.; increase lidocaine gtt as needed; send lidocaine level.  Also stated EP will be at bedside tomorrow AM.

## 2022-07-25 NOTE — SUBJECTIVE & OBJECTIVE
Interval History/Significant Events: Short runs of vtach yesterday. Push of lidocaine given and drip increased. Mexiletine discontinued as well as levo. Epi decreased to 0.02. Amio transitioned from PO to gtt. Continues to be stable on giapreza 16 and vaso 0.04.    Follow-up For: Procedure(s) (LRB):  REMOVAL, FOREIGN BODY (N/A)    Post-Operative Day: 3 Days Post-Op    Objective:     Vital Signs (Most Recent):  Temp: 98.42 °F (36.9 °C) (07/25/22 0615)  Pulse: 67 (07/25/22 0615)  Resp: 20 (07/25/22 0537)  BP: (!) 75/0 (07/25/22 0315)  SpO2: 100 % (07/25/22 0508)   Vital Signs (24h Range):  Temp:  [98.06 °F (36.7 °C)-100.4 °F (38 °C)] 98.42 °F (36.9 °C)  Pulse:  [58-89] 67  Resp:  [20-28] 20  SpO2:  [99 %-100 %] 100 %  BP: (75-82)/(0) 75/0  Arterial Line BP: ()/() 81/72     Weight: 100.7 kg (222 lb)  Body mass index is 29.29 kg/m².      Intake/Output Summary (Last 24 hours) at 7/25/2022 0625  Last data filed at 7/25/2022 0600  Gross per 24 hour   Intake 4029.65 ml   Output 2595 ml   Net 1434.65 ml       Physical Exam  Constitutional:       Comments: Intubated and sedated   HENT:      Head: Normocephalic and atraumatic.      Mouth/Throat:      Comments: OG in place  Eyes:      Pupils: Pupils are equal, round, and reactive to light.   Neck:      Comments: R IJ CVC    Cardiovascular:      Rate and Rhythm: Normal rate. Rhythm irregular.      Comments: LVAD in place -- 6400 rpm, 5.0L    Midline sternotomy c/d with dressing in place      Pulmonary:      Comments: Mechanically ventilated  Abdominal:      General: There is no distension.      Palpations: Abdomen is soft.      Comments: Prior driveline site packed with iodoform gauze   Genitourinary:     Comments: Lagunas in place draining clear urine  Musculoskeletal:      Comments: ECMO cannula sites with dressing in place.     Cutdown incision with seroma with wound vac in place. Minimal output    right radial arterial line   Skin:     General: Skin is warm and  dry.   Neurological:      Comments: Following commands when sedation paused       Vents:  Vent Mode: A/C (07/25/22 0508)  Ventilator Initiated: Yes (07/15/22 1027)  Set Rate: 16 BPM (07/25/22 0508)  Vt Set: 450 mL (07/25/22 0508)  Pressure Support: 8 cmH20 (07/22/22 0821)  PEEP/CPAP: 5 cmH20 (07/25/22 0508)  Oxygen Concentration (%): 50 (07/25/22 0615)  Peak Airway Pressure: 24 cmH2O (07/25/22 0508)  Plateau Pressure: 21 cmH20 (07/25/22 0508)  Total Ve: 10.3 mL (07/25/22 0508)  Negative Inspiratory Force (cm H2O): 0 (07/25/22 0508)  F/VT Ratio<105 (RSBI): (!) 37.04 (07/22/22 1315)    Lines/Drains/Airways       Central Venous Catheter Line  Duration             Percutaneous Central Line Insertion/Assessment - Quad Lumen  07/21/22 1515 right internal jugular 3 days              Drain  Duration                  Urethral Catheter 07/13/22 0848 Temperature probe;Latex 14 Fr. 11 days         NG/OG Tube 07/15/22 1030 Left nostril 9 days              Airway  Duration                  Airway - Non-Surgical 07/13/22 0848 11 days              Arterial Line  Duration             Arterial Line 07/13/22 2000 Right Radial 11 days              Line  Duration                  VAD 07/13/22 1300 Left ventricular assist device HeartMate 3 11 days              Peripheral Intravenous Line  Duration                  Midline Catheter Insertion/Assessment  - Single Lumen 07/21/22 1616 Left basilic vein (medial side of arm) 18g x 10cm 3 days                    Significant Labs:    CBC/Anemia Profile:  Recent Labs   Lab 07/24/22  1516 07/24/22 2010 07/24/22  2153 07/24/22  2221 07/25/22  0105 07/25/22  0401   WBC 28.19* 26.84*  --   --   --  24.84*   HGB 7.3* 7.3*  --   --   --  7.2*   HCT 22.8* 23.5*   < > 41 23* 23.5*    364  --   --   --  362   MCV 91 91  --   --   --  94   RDW 18.8* 19.0*  --   --   --  19.2*    < > = values in this interval not displayed.        Chemistries:  Recent Labs   Lab 07/24/22  1515 07/24/22 2010  07/25/22  0041 07/25/22  0401     139 141  --  138   K 3.9  3.9 3.6 4.0 3.9     107 108  --  106   CO2 21*  21* 23  --  21*   BUN 38*  38* 37*  --  35*   CREATININE 1.9*  1.9* 1.8*  --  1.7*   CALCIUM 9.2  9.2 9.1  --  9.5   ALBUMIN 1.7*  1.7* 1.7*  --  2.1*  2.1*   PROT 6.4  6.4 6.4  --  6.4  6.4   BILITOT 11.6*  11.6* 12.0*  --  11.3*  11.3*   ALKPHOS 108  108 115  --  111  111   ALT 36  36 38  --  37  37   AST 69*  69* 72*  --  69*  69*   MG 2.5  2.5 2.5  --  2.8*   PHOS 3.2  3.2 3.2  --  3.0       ABGs:   Recent Labs   Lab 07/25/22  0105   PH 7.357   PCO2 43.9   HCO3 24.7   POCSATURATED 98   BE -1     Coagulation:   Recent Labs   Lab 07/25/22  0401   INR 1.1   APTT 33.0*     Lactic Acid: No results for input(s): LACTATE in the last 48 hours.  All pertinent labs within the past 24 hours have been reviewed.    Significant Imaging:  I have reviewed all pertinent imaging results/findings within the past 24 hours.

## 2022-07-25 NOTE — ASSESSMENT & PLAN NOTE
Hx of VT/VF s/p ICD 2014. Severe NICM (EF 10%) with HM2 implanted 2018. ICD infected in 2020, so explanted and SQ-ICD ([employer]) 9/2020. Admitted for pump thrombosis and successful HM2 exchange with HM3 on 7/12/22. Course c/b cardiogenic shock/RV failure needing VA ECMO, WAGNER, possible infection. Episode of MMVT on 7/21  resulting in successful ICD discharge with restoration of NSR on interrogation. Precipitant are likely recent cardiac surgery, infection, electrolyte derangement, and on going shock needing vasopressors. On 7/24, called by primary team for frequent runs of non sustained VT without hemodynamic changes.      Recommendations   - restart amiodarone gtt 1 mg/min for 6h, then decrease to 0.5 mg/min. Would not transition to oral amiodarone until patients clinical status is much improved and off pressors/extubated   - continue lidocaine at 1mg/min, can increase if needed. Would obtain lidocaine level   - DC mexiletine  - would wean epinephrine and norepinephrine as tolerated   - repeat electrolytes, keep mag >2, K>4

## 2022-07-25 NOTE — SUBJECTIVE & OBJECTIVE
Past Medical History:   Diagnosis Date    Anticoagulant long-term use     CHF (congestive heart failure)     Class 1 obesity due to excess calories with serious comorbidity and body mass index (BMI) of 31.0 to 31.9 in adult     Dilated cardiomyopathy 1/10/2018    Disorder of kidney and ureter     CKD    Encounter for blood transfusion     Gout     HTN (hypertension)     Hx of psychiatric care     ICD (implantable cardioverter-defibrillator) infection 7/1/2020    Psychiatric problem     Thyroid disease     Ventricular tachycardia (paroxysmal)        Past Surgical History:   Procedure Laterality Date    AORTIC VALVULOPLASTY N/A 7/13/2022    Procedure: REPAIR, AORTIC VALVE;  Surgeon: Yg Kaufman MD;  [facility];  Service: Cardiovascular;  Laterality: N/A;    APPLICATION OF WOUND VACUUM-ASSISTED CLOSURE DEVICE N/A 7/15/2022    Procedure: APPLICATION, WOUND VAC;  Surgeon: Yg Kaufman MD;  [facility];  Service: Cardiovascular;  Laterality: N/A;  50 x 5 cm     CARDIAC CATHETERIZATION  Dec. 2012    CARDIAC DEFIBRILLATOR PLACEMENT Left     CRRT-D    COLONOSCOPY N/A 3/6/2018    Procedure: COLONOSCOPY;  Surgeon: Alonso Bone MD;  Location: [facility] (2ND FLR);  Service: Endoscopy;  Laterality: N/A;    COLONOSCOPY N/A 7/17/2019    Procedure: COLONOSCOPY;  Surgeon: Blane Valdez MD;  Location: [facility] ENDO (2ND FLR);  Service: Endoscopy;  Laterality: N/A;    COLONOSCOPY N/A 7/18/2019    Procedure: COLONOSCOPY;  Surgeon: Blane Valdez MD;  Location: [facility] ENDO (2ND FLR);  Service: Endoscopy;  Laterality: N/A;    ESOPHAGOGASTRODUODENOSCOPY N/A 7/17/2019    Procedure: EGD (ESOPHAGOGASTRODUODENOSCOPY);  Surgeon: Blane Valdez MD;  Location: [facility] ENDO (2ND FLR);  Service: Endoscopy;  Laterality: N/A;    ESOPHAGOGASTRODUODENOSCOPY N/A 7/18/2019    Procedure: EGD (ESOPHAGOGASTRODUODENOSCOPY);  Surgeon: Blane Valdez MD;  Location: [facility] ENDO (2ND FLR);  Service: Endoscopy;  Laterality: N/A;    INSERTION OF GRAFT TO  PERICARDIUM N/A 7/15/2022    Procedure: INSERTION, GRAFT, PERICARDIUM;  Surgeon: Yg Kaufman MD;  Location: [facility] OR [facility] FLR;  Service: Cardiovascular;  Laterality: N/A;    IRRIGATION OF MEDIASTINUM N/A 7/15/2022    Procedure: IRRIGATION, MEDIASTINUM;  Surgeon: Yg Kaufman MD;  Location: [facility] OR [facility] FLR;  Service: Cardiovascular;  Laterality: N/A;    LYSIS OF ADHESIONS  7/13/2022    Procedure: LYSIS, ADHESIONS;  Surgeon: Yg Kaufman MD;  [facility];  Service: Cardiovascular;;    NONINVASIVE CARDIAC ELECTROPHYSIOLOGY STUDY N/A 10/18/2019    Procedure: CARDIAC ELECTROPHYSIOLOGY STUDY, NONINVASIVE;  Surgeon: Raz Wagner MD;  Location: [facility] EP LAB;  Service: Cardiology;  Laterality: N/A;  VT, DFTs, MDT CRTD in situ, LVAD, anes, MB, 3098    REPLACEMENT OF IMPLANTABLE CARDIOVERTER-DEFIBRILLATOR (ICD) GENERATOR N/A 3/9/2020    Procedure: REPLACEMENT, ICD GENERATOR;  Surgeon: Harry Yun MD;  Location: [facility] EP LAB;  Service: Cardiology;  Laterality: N/A;  VT, ICD Gen Change and Lead Revision, MDT, MAC, DM,3 Prep    REPLACEMENT OF LEFT VENTRICULAR ASSIST DEVICE (LVAD)  7/13/2022    Procedure: REPLACEMENT, LVAD;  Surgeon: Yg Kaufman MD;  [facility];  Service: Cardiovascular;;    REPLACEMENT OF PUMP N/A 7/13/2022    Procedure: REPLACEMENT, PUMP;  Surgeon: Yg Kaufman MD;  [facility];  Service: Cardiovascular;  Laterality: N/A;  LVAD pump exchange  EXPLANATION OF HEATMATE 2  IMPLANTATION OF HEARTMATE 3  IMPLANTATION OF 8MM CHIMNEY GRAFT TO RFA  INITIATION OF ECMO  TEMPORARY CLOSURE OF CHEST    REVISION OF IMPLANTABLE CARDIOVERTER-DEFIBRILLATOR (ICD) ELECTRODE LEAD PLACEMENT N/A 3/9/2020    Procedure: REVISION, INSERTION, ELECTRODE LEAD, ICD;  Surgeon: Harry Yun MD;  Location: [facility] EP LAB;  Service: Cardiology;  Laterality: N/A;  VT, ICD Gen Change and Lead Revision, MDT, MAC, DM,3 Prep    STERNAL WOUND CLOSURE N/A 7/14/2022    Procedure: CLOSURE,  WOUND, STERNUM;  Surgeon: Yg Kaufman MD;  [facility];  Service: Cardiovascular;  Laterality: N/A;  temporary closure  evacuation of hematoma    STERNAL WOUND CLOSURE N/A 7/15/2022    Procedure: CLOSURE, WOUND, STERNUM;  Surgeon: Yg Kaufman MD;  Location: [facility] OR [facility] F[facility];  Service: Cardiovascular;  Laterality: N/A;    TREATMENT OF CARDIAC ARRHYTHMIA  10/18/2019    Procedure: Cardioversion or Defibrillation;  Surgeon: Raz Wagner MD;  Location: [facility] EP LAB;  Service: Cardiology;;       Review of patient's allergies indicates:   Allergen Reactions    Lisinopril Anaphylaxis    Hydralazine analogues      Chronic constipation, impotence, dizziness       No current facility-administered medications on file prior to encounter.     Current Outpatient Medications on File Prior to Encounter   Medication Sig    allopurinoL (ZYLOPRIM) 100 MG tablet TAKE 1 TABLET (100 MG) BY MOUTH NIGHTLY.    amiodarone (PACERONE) 200 MG Tab Take 2 tablets (400 mg total) by mouth once daily.    amLODIPine (NORVASC) 10 MG tablet TAKE 1 TABLET BY MOUTH EVERY DAY    aspirin (ECOTRIN) 81 MG EC tablet Take 1 tablet (81 mg total) by mouth once daily.    busPIRone (BUSPAR) 5 MG Tab Take 1 tablet (5 mg total) by mouth 3 (three) times daily.    levothyroxine (SYNTHROID) 112 MCG tablet Take 1 tablet (112 mcg total) by mouth before breakfast.    mexiletine (MEXITIL) 250 MG Cap Take 1 capsule (250 mg total) by mouth every 8 (eight) hours.    pantoprazole (PROTONIX) 40 MG tablet TAKE 1 TABLET (40 MG TOTAL) BY MOUTH DAILY WITH LUNCH.    warfarin (COUMADIN) 5 MG tablet TAKE 7.5 MG ORALLY DAILY EVERY SUNDAY AND THURSDAY, TAKE 5 MG ORALLY DAILY ON OTHER DAYS    [DISCONTINUED] ferrous gluconate (FERGON) 324 MG tablet TAKE 1 TABLET (324 MG TOTAL) BY MOUTH WITH LUNCH.    [DISCONTINUED] sildenafiL (VIAGRA) 100 MG tablet Take 1 tablet (100 mg total) by mouth daily as needed for Erectile Dysfunction.    [DISCONTINUED] torsemide (DEMADEX)  20 MG Tab Take 1 tablet (20 mg total) by mouth once daily.     Family History       Problem Relation (Age of Onset)    Cancer Sister (54)    Coronary artery disease Father    Diabetes Father    Heart disease Father    Hypertension Father    No Known Problems Mother, Brother          Tobacco Use    Smoking status: Former Smoker     Packs/day: 1.00     Years: 31.00     Pack years: 31.00     Types: Cigarettes     Quit date: 2018     Years since quittin.5    Smokeless tobacco: Never Used    Tobacco comment: pt is quiting on his own - pt stated not qualified for program;  pt  quit on his own   Substance and Sexual Activity    Alcohol use: No     Alcohol/week: 0.0 standard drinks     Comment: quit    Drug use: No    Sexual activity: Yes     Partners: Female     Birth control/protection: None     Comment: 10/5/17  with same partner 7 years      Review of Systems   Unable to perform ROS: Intubated   Objective:     Vital Signs (Most Recent):  Temp: 98.42 °F (36.9 °C) (22 0430)  Pulse: 65 (22 0430)  Resp: 20 (22 0209)  BP: (!) 75/0 (22 0315)  SpO2: 100 % (22 0359)   Vital Signs (24h Range):  Temp:  [98.06 °F (36.7 °C)-100.4 °F (38 °C)] 98.42 °F (36.9 °C)  Pulse:  [58-89] 65  Resp:  [20-28] 20  SpO2:  [99 %-100 %] 100 %  BP: (75-82)/(0) 75/0  Arterial Line BP: ()/() 77/68       Weight: 102.1 kg (225 lb)  Body mass index is 29.69 kg/m².    SpO2: 100 %  O2 Device (Oxygen Therapy): ventilator    Physical Exam  Constitutional:       General: He is not in acute distress.     Appearance: He is not ill-appearing.   HENT:      Head: Normocephalic and atraumatic.      Nose: No congestion.      Mouth/Throat:      Mouth: Mucous membranes are moist.   Eyes:      Extraocular Movements: Extraocular movements intact.      Conjunctiva/sclera: Conjunctivae normal.   Cardiovascular:      Rate and Rhythm: Normal rate and regular rhythm.      Pulses: Normal pulses.      Comments:  Frequent ectopy   Pulmonary:      Effort: Pulmonary effort is normal. No respiratory distress.      Breath sounds: Normal breath sounds. No wheezing.      Comments: Intubated  Abdominal:      General: Abdomen is flat. There is no distension.      Palpations: Abdomen is soft.      Tenderness: There is no abdominal tenderness. There is no guarding or rebound.   Musculoskeletal:      Cervical back: Normal range of motion.      Right lower leg: No edema.      Left lower leg: No edema.   Skin:     General: Skin is warm.      Capillary Refill: Capillary refill takes less than 2 seconds.      Findings: No rash.   Neurological:      Mental Status: He is alert and oriented to person, place, and time.      Comments: Sedated but follows commands and easily arousable     Psychiatric:         Mood and Affect: Mood normal.       Significant Labs: BMP:   Recent Labs   Lab 07/24/22  1006 07/24/22  1515 07/24/22 2010 07/25/22  0041   * 127*  127* 119*  --     139  139 141  --    K 3.7 3.9  3.9 3.6 4.0    107  107 108  --    CO2 22* 21*  21* 23  --    BUN 37* 38*  38* 37*  --    CREATININE 1.9* 1.9*  1.9* 1.8*  --    CALCIUM 9.3 9.2  9.2 9.1  --    MG 2.6 2.5  2.5 2.5  --    , CMP:   Recent Labs   Lab 07/24/22  1006 07/24/22  1515 07/24/22 2010 07/25/22  0041    139  139 141  --    K 3.7 3.9  3.9 3.6 4.0    107  107 108  --    CO2 22* 21*  21* 23  --    * 127*  127* 119*  --    BUN 37* 38*  38* 37*  --    CREATININE 1.9* 1.9*  1.9* 1.8*  --    CALCIUM 9.3 9.2  9.2 9.1  --    PROT 6.4 6.4  6.4 6.4  --    ALBUMIN 1.7* 1.7*  1.7* 1.7*  --    BILITOT 11.2* 11.6*  11.6* 12.0*  --    ALKPHOS 107 108  108 115  --    AST 57* 69*  69* 72*  --    ALT 35 36  36 38  --    ANIONGAP 11 11  11 10  --    ESTGFRAFRICA 44.9* 44.9*  44.9* 47.9*  --    EGFRNONAA 38.8* 38.8*  38.8* 41.4*  --    , and All pertinent lab results from the last 24 hours have been reviewed.    Significant  Imaging:  reviewed

## 2022-07-25 NOTE — CARE UPDATE
Called by primary team for frequent runs of non sustained VT without hemodynamic changes. Plan discussed with EP fellow and EP staff     Recommendations   - restart amiodarone gtt 1 mg/min for 6h, then decrease to 0.5 mg/min. Would not transition to oral amiodarone until patients clinical status is much improved and off pressors  - continue lidocaine at 1mg/min, can increase if needed. Would obtain lidocaine level   - would DC mexiletine given same class as lidocaine  - would wean epinephrine and norepinephrine as tolerated for MAP goals   - repeat electrolytes, keep mag >2, K>4    Full note to follow

## 2022-07-25 NOTE — CONSULTS
[facility] - [facility] Int[facility]  Cardiac Electrophysiology  Consult Note    Admission Date: 6/27/2022  Code Status: Full Code   Attending Provider: Yg Kaufman MD  Consulting Provider: Taco Jeronimo MD  Principal Problem:Left ventricular assist device (LVAD) complication    Consults  Subjective:     Chief Complaint:  ADHF    HPI:   55 yo male with previous HM2 LVAD admitted with COVID respiratory failure complicated with LVAD pump thrombosis that was refractory to medical therapy (failed integrillin). Underwent LVAD pump exchange with HM3. Post-op course complicated with worsening cardiogenic shock/RV failure requiring VA-ECMO. Improved clinically underwent successful decannulation. He remains intubated and requiring pressors and RV support with epi/NO. On 7/21, EP was consulted due to episode of MMVT resulting in SQ-ICD discharge. Discharge successful and restoration of NSR. He was started on amiodarone gtt and restarted on home mexiletine. On 7/24, patient developed more frequent episodes of non sustained VT. Episodes were up to 20 beat runs at maximum, but frequent 5 beat runs occurring back to back. Patient was given bolus of lidocaine without improvement. EP was reconsulted for non sustained VT    Currently, patient intubated and on HD support with epi, norepinephrine, vasopressin, angiotensin II, NO. Sedated on propofol and precedex. On arrival MAPs >70. Appeared to be in sinus rhythm with rates 70-80 and frequent runs of non sustained VT      Past Medical History:   Diagnosis Date    Anticoagulant long-term use     CHF (congestive heart failure)     Class 1 obesity due to excess calories with serious comorbidity and body mass index (BMI) of 31.0 to 31.9 in adult     Dilated cardiomyopathy 1/10/2018    Disorder of kidney and ureter     CKD    Encounter for blood transfusion     Gout     HTN (hypertension)     Hx of psychiatric care     ICD (implantable cardioverter-defibrillator) infection  7/1/2020    Psychiatric problem     Thyroid disease     Ventricular tachycardia (paroxysmal)        Past Surgical History:   Procedure Laterality Date    AORTIC VALVULOPLASTY N/A 7/13/2022    Procedure: REPAIR, AORTIC VALVE;  Surgeon: Yg Kaufman MD;  Location: [facility] OR 2ND FLR;  Service: Cardiovascular;  Laterality: N/A;    APPLICATION OF WOUND VACUUM-ASSISTED CLOSURE DEVICE N/A 7/15/2022    Procedure: APPLICATION, WOUND VAC;  Surgeon: Yg Kaufman MD;  Location: [facility] OR 2ND FLR;  Service: Cardiovascular;  Laterality: N/A;  50 x 5 cm     CARDIAC CATHETERIZATION  Dec. 2012    CARDIAC DEFIBRILLATOR PLACEMENT Left     CRRT-D    COLONOSCOPY N/A 3/6/2018    Procedure: COLONOSCOPY;  Surgeon: Alonso Bone MD;  Location: [facility] ENDO (2ND FLR);  Service: Endoscopy;  Laterality: N/A;    COLONOSCOPY N/A 7/17/2019    Procedure: COLONOSCOPY;  Surgeon: Blane Valdez MD;  Location: [facility] ENDO (2ND FLR);  Service: Endoscopy;  Laterality: N/A;    COLONOSCOPY N/A 7/18/2019    Procedure: COLONOSCOPY;  Surgeon: Blane Valdez MD;  Location: [facility] ENDO (2ND FLR);  Service: Endoscopy;  Laterality: N/A;    ESOPHAGOGASTRODUODENOSCOPY N/A 7/17/2019    Procedure: EGD (ESOPHAGOGASTRODUODENOSCOPY);  Surgeon: Blane Valdez MD;  Location: [facility] ENDO (2ND FLR);  Service: Endoscopy;  Laterality: N/A;    ESOPHAGOGASTRODUODENOSCOPY N/A 7/18/2019    Procedure: EGD (ESOPHAGOGASTRODUODENOSCOPY);  Surgeon: Blane Valdez MD;  Location: [facility] ENDO (2ND FLR);  Service: Endoscopy;  Laterality: N/A;    INSERTION OF GRAFT TO PERICARDIUM N/A 7/15/2022    Procedure: INSERTION, GRAFT, PERICARDIUM;  Surgeon: Yg Kaufman MD;  Location: [facility] OR 2ND FLR;  Service: Cardiovascular;  Laterality: N/A;    IRRIGATION OF MEDIASTINUM N/A 7/15/2022    Procedure: IRRIGATION, MEDIASTINUM;  Surgeon: Yg Kaufman MD;  Location: [facility] OR 2ND FLR;  Service: Cardiovascular;  Laterality: N/A;    LYSIS OF ADHESIONS  7/13/2022    Procedure: LYSIS, ADHESIONS;  Surgeon: Yg  MD Shae;  Location: [facility] OR [address] [place] [address];  Service: Cardiovascular;;    NONINVASIVE CARDIAC ELECTROPHYSIOLOGY STUDY N/A 10/18/2019    Procedure: CARDIAC ELECTROPHYSIOLOGY STUDY, NONINVASIVE;  Surgeon: Raz Wagner MD;  Location: [facility] EP LAB;  Service: Cardiology;  Laterality: N/A;  VT, DFTs, MDT CRTD in situ, LVAD, anes, MB, 3098    REPLACEMENT OF IMPLANTABLE CARDIOVERTER-DEFIBRILLATOR (ICD) GENERATOR N/A 3/9/2020    Procedure: REPLACEMENT, ICD GENERATOR;  Surgeon: Harry Yun MD;  Location: [facility] EP LAB;  Service: Cardiology;  Laterality: N/A;  VT, ICD Gen Change and Lead Revision, MDT, MAC, DM,3 Prep    REPLACEMENT OF LEFT VENTRICULAR ASSIST DEVICE (LVAD)  7/13/2022    Procedure: REPLACEMENT, LVAD;  Surgeon: Yg Kaufman MD;  [facility];  Service: Cardiovascular;;    REPLACEMENT OF PUMP N/A 7/13/2022    Procedure: REPLACEMENT, PUMP;  Surgeon: Yg Kaufman MD;  Location: [facility] OR [address] [place] [address];  Service: Cardiovascular;  Laterality: N/A;  LVAD pump exchange  EXPLANATION OF HEATMATE 2  IMPLANTATION OF HEARTMATE 3  IMPLANTATION OF 8MM CHIMNEY GRAFT TO RFA  INITIATION OF ECMO  TEMPORARY CLOSURE OF CHEST    REVISION OF IMPLANTABLE CARDIOVERTER-DEFIBRILLATOR (ICD) ELECTRODE LEAD PLACEMENT N/A 3/9/2020    Procedure: REVISION, INSERTION, ELECTRODE LEAD, ICD;  Surgeon: Harry Yun MD;  Location: [facility] EP LAB;  Service: Cardiology;  Laterality: N/A;  VT, ICD Gen Change and Lead Revision, MDT, MAC, DM,3 Prep    STERNAL WOUND CLOSURE N/A 7/14/2022    Procedure: CLOSURE, WOUND, STERNUM;  Surgeon: Yg Kaufman MD;  [facility];  Service: Cardiovascular;  Laterality: N/A;  temporary closure  evacuation of hematoma    STERNAL WOUND CLOSURE N/A 7/15/2022    Procedure: CLOSURE, WOUND, STERNUM;  Surgeon: Yg Kaufman MD;  Location: [facility] OR [address] [place] [address];  Service: Cardiovascular;  Laterality: N/A;    TREATMENT OF CARDIAC ARRHYTHMIA  10/18/2019    Procedure: Cardioversion or  Defibrillation;  Surgeon: Raz Wagner MD;  Location: [facility] EP LAB;  Service: Cardiology;;       Review of patient's allergies indicates:   Allergen Reactions    Lisinopril Anaphylaxis    Hydralazine analogues      Chronic constipation, impotence, dizziness       No current facility-administered medications on file prior to encounter.     Current Outpatient Medications on File Prior to Encounter   Medication Sig    allopurinoL (ZYLOPRIM) 100 MG tablet TAKE 1 TABLET (100 MG) BY MOUTH NIGHTLY.    amiodarone (PACERONE) 200 MG Tab Take 2 tablets (400 mg total) by mouth once daily.    amLODIPine (NORVASC) 10 MG tablet TAKE 1 TABLET BY MOUTH EVERY DAY    aspirin (ECOTRIN) 81 MG EC tablet Take 1 tablet (81 mg total) by mouth once daily.    busPIRone (BUSPAR) 5 MG Tab Take 1 tablet (5 mg total) by mouth 3 (three) times daily.    levothyroxine (SYNTHROID) 112 MCG tablet Take 1 tablet (112 mcg total) by mouth before breakfast.    mexiletine (MEXITIL) 250 MG Cap Take 1 capsule (250 mg total) by mouth every 8 (eight) hours.    pantoprazole (PROTONIX) 40 MG tablet TAKE 1 TABLET (40 MG TOTAL) BY MOUTH DAILY WITH LUNCH.    warfarin (COUMADIN) 5 MG tablet TAKE 7.5 MG ORALLY DAILY EVERY SUNDAY AND THURSDAY, TAKE 5 MG ORALLY DAILY ON OTHER DAYS    [DISCONTINUED] ferrous gluconate (FERGON) 324 MG tablet TAKE 1 TABLET (324 MG TOTAL) BY MOUTH WITH LUNCH.    [DISCONTINUED] sildenafiL (VIAGRA) 100 MG tablet Take 1 tablet (100 mg total) by mouth daily as needed for Erectile Dysfunction.    [DISCONTINUED] torsemide (DEMADEX) 20 MG Tab Take 1 tablet (20 mg total) by mouth once daily.     Family History       Problem Relation (Age of Onset)    Cancer Sister (54)    Coronary artery disease Father    Diabetes Father    Heart disease Father    Hypertension Father    No Known Problems Mother, Brother          Tobacco Use    Smoking status: Former Smoker     Packs/day: 1.00     Years: 31.00     Pack years: 31.00     Types:  Cigarettes     Quit date: 2018     Years since quittin.5    Smokeless tobacco: Never Used    Tobacco comment: pt is quiting on his own - pt stated not qualified for program;  pt  quit on his own   Substance and Sexual Activity    Alcohol use: No     Alcohol/week: 0.0 standard drinks     Comment: quit    Drug use: No    Sexual activity: Yes     Partners: Female     Birth control/protection: None     Comment: 10/5/17  with same partner 7 years      Review of Systems   Unable to perform ROS: Intubated   Objective:     Vital Signs (Most Recent):  Temp: 98.42 °F (36.9 °C) (22 0430)  Pulse: 65 (22 0430)  Resp: 20 (22 0209)  BP: (!) 75/0 (22 0315)  SpO2: 100 % (22 0359)   Vital Signs (24h Range):  Temp:  [98.06 °F (36.7 °C)-100.4 °F (38 °C)] 98.42 °F (36.9 °C)  Pulse:  [58-89] 65  Resp:  [20-28] 20  SpO2:  [99 %-100 %] 100 %  BP: (75-82)/(0) 75/0  Arterial Line BP: ()/() 77/68       Weight: 102.1 kg (225 lb)  Body mass index is 29.69 kg/m².    SpO2: 100 %  O2 Device (Oxygen Therapy): ventilator    Physical Exam  Constitutional:       General: He is not in acute distress.     Appearance: He is not ill-appearing.   HENT:      Head: Normocephalic and atraumatic.      Nose: No congestion.      Mouth/Throat:      Mouth: Mucous membranes are moist.   Eyes:      Extraocular Movements: Extraocular movements intact.      Conjunctiva/sclera: Conjunctivae normal.   Cardiovascular:      Rate and Rhythm: Normal rate and regular rhythm.      Pulses: Normal pulses.      Comments: Frequent ectopy   Pulmonary:      Effort: Pulmonary effort is normal. No respiratory distress.      Breath sounds: Normal breath sounds. No wheezing.      Comments: Intubated  Abdominal:      General: Abdomen is flat. There is no distension.      Palpations: Abdomen is soft.      Tenderness: There is no abdominal tenderness. There is no guarding or rebound.   Musculoskeletal:      Cervical back:  Normal range of motion.      Right lower leg: No edema.      Left lower leg: No edema.   Skin:     General: Skin is warm.      Capillary Refill: Capillary refill takes less than 2 seconds.      Findings: No rash.   Neurological:      Mental Status: He is alert and oriented to person, place, and time.      Comments: Sedated but follows commands and easily arousable     Psychiatric:         Mood and Affect: Mood normal.       Significant Labs: BMP:   Recent Labs   Lab 07/24/22  1006 07/24/22  1515 07/24/22 2010 07/25/22  0041   * 127*  127* 119*  --     139  139 141  --    K 3.7 3.9  3.9 3.6 4.0    107  107 108  --    CO2 22* 21*  21* 23  --    BUN 37* 38*  38* 37*  --    CREATININE 1.9* 1.9*  1.9* 1.8*  --    CALCIUM 9.3 9.2  9.2 9.1  --    MG 2.6 2.5  2.5 2.5  --    , CMP:   Recent Labs   Lab 07/24/22  1006 07/24/22  1515 07/24/22 2010 07/25/22  0041    139  139 141  --    K 3.7 3.9  3.9 3.6 4.0    107  107 108  --    CO2 22* 21*  21* 23  --    * 127*  127* 119*  --    BUN 37* 38*  38* 37*  --    CREATININE 1.9* 1.9*  1.9* 1.8*  --    CALCIUM 9.3 9.2  9.2 9.1  --    PROT 6.4 6.4  6.4 6.4  --    ALBUMIN 1.7* 1.7*  1.7* 1.7*  --    BILITOT 11.2* 11.6*  11.6* 12.0*  --    ALKPHOS 107 108  108 115  --    AST 57* 69*  69* 72*  --    ALT 35 36  36 38  --    ANIONGAP 11 11 11 10  --    ESTGFRAFRICA 44.9* 44.9*  44.9* 47.9*  --    EGFRNONAA 38.8* 38.8*  38.8* 41.4*  --    , and All pertinent lab results from the last 24 hours have been reviewed.    Significant Imaging:  reviewed              Assessment and Plan:     VT (ventricular tachycardia)  Hx of VT/VF s/p ICD 2014. Severe NICM (EF 10%) with HM2 implanted 2018. ICD infected in 2020, so explanted and SQ-ICD ([employer]) 9/2020. Admitted for pump thrombosis and successful HM2 exchange with HM3 on 7/12/22. Course c/b cardiogenic shock/RV failure needing VA ECMO, [facility]AGNER, possible infection. Episode of  MMVT on 7/21  resulting in successful ICD discharge with restoration of NSR on interrogation. Precipitant are likely recent cardiac surgery, infection, electrolyte derangement, and on going shock needing vasopressors. On 7/24, called by primary team for frequent runs of non sustained VT without hemodynamic changes.      Recommendations   - restart amiodarone gtt 1 mg/min for 6h, then decrease to 0.5 mg/min. Would not transition to oral amiodarone until patients clinical status is much improved and off pressors/extubated   - continue lidocaine at 1mg/min, can increase if needed. Would obtain lidocaine level   - DC mexiletine  - would wean epinephrine and norepinephrine as tolerated   - repeat electrolytes, keep mag >2, K>4           Thank you for your consult. I will follow-up with patient. Please contact us if you have any additional questions.    Taco Jeronimo MD  Cardiac Electrophysiology  [facility] - [facility]

## 2022-07-25 NOTE — PROGRESS NOTES
[facility]  Heart Transplant  Progress Note    Patient Name: Tim Richards  MRN: [id number]  Admission Date: 6/27/2022  Hospital Length of Stay: 28 days  Attending Physician: Yg Kaufman MD  Primary Care Provider: Deyanira Booth MD  Principal Problem:Left ventricular assist device (LVAD) complication    Subjective:     Interval History:   No acute events overnight. Intubated and able to respond appropriately and follow commands. UOP 1.4+L yesterday.     CVP: 10  UOP: 100 ml/hr    Continuous Infusions:   amiodarone in dextrose 5% 0.5 mg/min (07/25/22 0700)    angiotensin II 16 ng/kg/min (07/25/22 0700)    dexmedetomidine (PRECEDEX) infusion 0.8 mcg/kg/hr (07/25/22 1031)    EPINEPHrine 0.02 mcg/kg/min (07/25/22 0700)    furosemide (LASIX) 2 mg/mL continuous infusion (non-titrating) 1 mg/hr (07/25/22 0700)    heparin (porcine) in 5 % dex 1,800 Units/hr (07/25/22 0700)    LIDOcaine 1 mg/min (07/25/22 0700)    NORepinephrine bitartrate-D5W Stopped (07/25/22 0559)    propofoL Stopped (07/25/22 0600)    vasopressin 0.04 Units/min (07/25/22 0700)     Scheduled Meds:   acetylcysteine 100 mg/ml (10%)  4 mL Nebulization TID WAKE    albuterol sulfate  2.5 mg Nebulization TID WAKE    amiodarone  400 mg Oral BID    aspirin  81 mg Per OG tube Daily    ceFEPime (MAXIPIME) IVPB  2 g Intravenous Q12H    guaiFENesin 100 mg/5 ml  300 mg Per NG tube Q6H    levothyroxine  112 mcg Per OG tube Before breakfast    midodrine  15 mg Per NG tube Q8H    pantoprazole  40 mg Intravenous BID    polyethylene glycol  17 g Per NG tube Daily     PRN Meds:sodium chloride 0.9%, sodium chloride 0.9%, dextrose 10%, dextrose 10%, HYDROmorphone, HYDROmorphone    Review of patient's allergies indicates:   Allergen Reactions    Lisinopril Anaphylaxis    Hydralazine analogues      Chronic constipation, impotence, dizziness     Objective:     Vital Signs (Most Recent):  Temp: 98.96 °F (37.2 °C) (07/25/22  1100)  Pulse: 71 (07/25/22 1100)  Resp: (!) 26 (07/25/22 1100)  BP: (!) 85/74 (07/25/22 1100)  SpO2: 99 % (07/25/22 0806)   Vital Signs (24h Range):  Temp:  [98.06 °F (36.7 °C)-99.14 °F (37.3 °C)] 98.96 °F (37.2 °C)  Pulse:  [51-86] 71  Resp:  [20-27] 26  SpO2:  [99 %-100 %] 99 %  BP: (75-88)/(0-75) 85/74  Arterial Line BP: ()/() 79/67     Patient Vitals for the past 72 hrs (Last 3 readings):   Weight   07/25/22 0500 100.7 kg (222 lb)   07/24/22 0500 102.1 kg (225 lb)   07/23/22 0500 103.4 kg (228 lb)       Body mass index is 29.29 kg/m².      Intake/Output Summary (Last 24 hours) at 7/25/2022 1149  Last data filed at 7/25/2022 1100  Gross per 24 hour   Intake 3305.26 ml   Output 2625 ml   Net 680.26 ml       Physical Exam  Constitutional:       General: He is not in acute distress.     Appearance: He is obese. He is ill-appearing.      Comments: Intubated   HENT:      Head: Normocephalic.      Nose: Nose normal.      Mouth/Throat:      Mouth: Mucous membranes are moist.   Eyes:      General: Scleral icterus present.   Cardiovascular:      Rate and Rhythm: Normal rate and regular rhythm.      Comments: VAD hum appreciated  Pulses detected via doppler  Pulmonary:      Comments: Coarse mechanical breath sounds bilaterally  Abdominal:      General: Bowel sounds are normal.      Palpations: Abdomen is soft.   Musculoskeletal:      Cervical back: Neck supple.      Right lower leg: No edema.      Left lower leg: No edema.   Skin:     General: Skin is warm.   Neurological:      General: No focal deficit present.       Significant Labs:  CBC:  Recent Labs   Lab 07/24/22  1516 07/24/22 2010 07/24/22  2153 07/25/22  0105 07/25/22  0401 07/25/22  0818   WBC 28.19* 26.84*  --   --  24.84*  --    RBC 2.50* 2.57*  --   --  2.49*  --    HGB 7.3* 7.3*  --   --  7.2*  --    HCT 22.8* 23.5*   < > 23* 23.5* 24*    364  --   --  362  --    MCV 91 91  --   --  94  --    MCH 29.2 28.4  --   --  28.9  --    MCHC 32.0  31.1*  --   --  30.6*  --     < > = values in this interval not displayed.       BNP:  Recent Labs   Lab 07/20/22  0421 07/22/22  0317 07/25/22  0401   * 2,579* 2,301*       CMP:  Recent Labs   Lab 07/24/22  1515 07/24/22 2010 07/25/22  0041 07/25/22  0401   *  127* 119*  --  132*   CALCIUM 9.2  9.2 9.1  --  9.5   ALBUMIN 1.7*  1.7* 1.7*  --  2.1*  2.1*   PROT 6.4  6.4 6.4  --  6.4  6.4     139 141  --  138   K 3.9  3.9 3.6 4.0 3.9   CO2 21*  21* 23  --  21*     107 108  --  106   BUN 38*  38* 37*  --  35*   CREATININE 1.9*  1.9* 1.8*  --  1.7*   ALKPHOS 108  108 115  --  111  111   ALT 36  36 38  --  37  37   AST 69*  69* 72*  --  69*  69*   BILITOT 11.6*  11.6* 12.0*  --  11.3*  11.3*        Coagulation:   Recent Labs   Lab 07/24/22  1516 07/24/22 2010 07/25/22  0401   INR 1.1  1.1 1.1 1.1   APTT 32.8*  32.8* 32.7* 33.0*       LDH:  Recent Labs   Lab 07/23/22  0402 07/24/22  0352 07/25/22  0401   * 566* 381*       Microbiology:  Microbiology Results (last 7 days)       Procedure Component Value Units Date/Time    Blood culture [[id number]] Collected: 07/22/22 0918    Order Status: Completed Specimen: Blood from Peripheral, Hand, Left Updated: 07/25/22 1012     Blood Culture, Routine No Growth to date      No Growth to date      No Growth to date      No Growth to date    Blood culture [[id number]] Collected: 07/22/22 0857    Order Status: Completed Specimen: Blood from Peripheral, Antecubital, Right Updated: 07/25/22 1012     Blood Culture, Routine No Growth to date      No Growth to date      No Growth to date      No Growth to date    Blood culture [[id number]] Collected: 07/20/22 1450    Order Status: Completed Specimen: Blood from Wrist, Left Updated: 07/24/22 1612     Blood Culture, Routine No Growth to date      No Growth to date      No Growth to date      No Growth to date      No Growth to date    Blood culture [[id number]] Collected: 07/20/22 1445     Order Status: Completed Specimen: Blood from Peripheral, Forearm, Left Updated: 07/24/22 1612     Blood Culture, Routine No Growth to date      No Growth to date      No Growth to date      No Growth to date      No Growth to date    Culture, VAP (BAL) [[id number]]  (Abnormal)  (Susceptibility) Collected: 07/20/22 1120    Order Status: Completed Specimen: Bronchial Alveolar Lavage from BAL, RML Updated: 07/23/22 1048     VAP BAL CULTURE KLEBSIELLA AEROGENES  Few  Normal respiratory sachin also present       Gram Stain (Respiratory) Few WBC's     Gram Stain (Respiratory) Rare Gram positive cocci     Gram Stain (Respiratory) Rare Gram negative rods    Blood culture [[id number]]     Order Status: Canceled Specimen: Blood     Blood culture [[id number]]     Order Status: Canceled Specimen: Blood             I have reviewed all pertinent labs within the past 24 hours.    Estimated Creatinine Clearance: 61.3 mL/min (A) (based on SCr of 1.7 mg/dL (H)).    Diagnostic Results:  I have reviewed and interpreted all pertinent imaging results/findings within the past 24 hours.    Assessment and Plan:     57 Y/O M with Hx of stage D HFrEF (EF 10%) due to NICM underwent HM2 implant 3/8/18 and exchange of outflow graft 3/9/18, Hx VT/VF s/p SICD 2014 presenting with gradual worsening shortness of breath associated with nonpleuritic, nonradiating bilateral 4/10 retrosternal chest pressure pain.  Symptoms began yesterday and have gradually worsened in the last 12 hours.  He does report going to a family picnic with increased consumption of chicken wings, ribs and other salty meat products.  Prior to symptom onset, he reports nausea, nonbloody diarrhea began yesterday and single episode of nonbloody nonbilious vomiting this morning. He also complains of dizziness, lightheadedness, and a mild HA. SOB worsened this morning prompting him to come to the ED.     In the ED, patient was in respiratory distress requiring BiPAP. MAP 96 and started  on Nitro gtt. Work-up revealed WBC 10, K 5.4, Cr 2.5 (baseline 1.9), BNP 1950 (baseline 200-300s), Bili 2.1, LDH 1058, and INR 3.2. Bedside TTE with severely reduced EF, AV not opening, septum bulging into RV. Given IV Lasix 80 mg x1 with only 100-200 mL UOP and dark in color. HM2 interrogated and flows have been 8.5-9 L/min with no alarms or power surges. Cardiology consulted in the ED, dicussed with HTS, and decision made to admit to ICU for further mgmt.       * Left ventricular assist device (LVAD) complication  The patient presented with COVID and found to have an uptrending LDH with increased power.  He underwent HM III upgrade on 7/13/22  -Daily LDH   -Continue Heparin drip    Procedure: Device Interrogation Including analysis of device parameters  Current Settings: Ventricular Assist Device    TXP LVAD INTERROGATIONS 7/25/2022 7/25/2022 7/25/2022 7/25/2022 7/25/2022 7/25/2022 7/25/2022   Type HeartMate3 HeartMate3 HeartMate3 HeartMate3 HeartMate3 HeartMate3 HeartMate3   Flow 5.3 5.2 5.4 5.5 5.5 5.4 5.3   Speed 6400 6400 6400 6400 6400 6400 6400   PI 3.5 3.6 3.4 3.3 3.5 3.4 3.5   Power (Jackson) 5.4 5.4 5.5 5.5 5.5 5.6 5.4   LSL 6000 6000 6000 6000 6000 6000 6000   Low Flow Alarm - - - - - - -   Pulsatility - - - - - Intermittent pulse Intermittent pulse       Anxiety  -Currently intubated and sedated    History of ventricular tachycardia  Patient experienced an episode of VT on 6/30 and experienced an ICD shock thought to be related to hypokalemia (K of 3.1 on 6/30)  - Aggressively replete K, keep K>4 and Mg>2  - Continue mexiletine PO, lidocaine gtt, and amiodarone gtt   - EP signed off and recommending Amiodarone and Mexilitine, so would try to wean Lidocaine gtt if able  - Continue to monitor on telemetry    COVID-19  -Previously hypoxic then recovered  -ID was consulted, recommended Remdesivir, course completed    Elevated serum lactate dehydrogenase (LDH)  S/p HM3 exchange for HM2 pump thrombosis  Trend  LDH daily    amiodarone induced hypothyrodism  -Continue levothyroxine 112 mcg     LVAD (left ventricular assist device) present  Procedure: Device Interrogation Including analysis of device parameters  Current Settings: Ventricular Assist Device  Review of device function is stable    TXP LVAD INTERROGATIONS 7/24/2022 7/24/2022 7/24/2022 7/24/2022 7/24/2022 7/24/2022 7/24/2022   Type HeartMate3 HeartMate3 HeartMate3 HeartMate3 HeartMate3 HeartMate3 HeartMate3   Flow 5.5 5.6 5.5 5.5 5.4 5.5 5.5   Speed 6400 6450 6400 6400 6400 6400 6400   PI 3.1 3.2 3.3 3.4 3.4 3.3 3.1   Power (Jackson) 5.5 5.5 5.5 5.5 5.5 5.6 5.5   LSL - - - 6000 6000 6000 6000   Low Flow Alarm - - - - - - -   Pulsatility Intermittent pulse Intermittent pulse Intermittent pulse Intermittent pulse Intermittent pulse Intermittent pulse Intermittent pulse       CKD (chronic kidney disease), stage III  WAGNER on CKD  Patient's baseline is 1.5-2.0  - Nephrology is following  - Avoiding nephrotoxic medications  - Continue to monitor  - Strict I/O's    Chronic combined systolic and diastolic heart failure  ADHF  Underwent HM III upgrade on 7/13/22, currently on VA ECMO  Currently on Epi gtt, Vasopressin, Norepi, and Angiotension II  Currently on Precedex gtt  Being treated for sepsis  Currently intubated and sedated   Chest tube removal, bronch, and old driveline removed 7/22          Kareem Gaona MD  Heart Transplant  [facility]

## 2022-07-25 NOTE — PROGRESS NOTES
Dr. Kaufman at bedside.  Notified of uop, cvp, gtts, rate, lab resuls, and color and amt of drainage from prior drive line, heart mate 3 driveline site.

## 2022-07-25 NOTE — ASSESSMENT & PLAN NOTE
Lab Results   Component Value Date    WBC 23.04 (H) 07/25/2022 7/20 +VAP Cx Klebsiella earogenes   -on meropenem and cefepime  -Plan per primary team

## 2022-07-25 NOTE — PT/OT/SLP PROGRESS
Occupational Therapy      Patient Name:  Tim Richards   MRN:  [id number]    Patient not seen today secondary to patient remains intubated/sedated. Will follow-up 7/26 after possible extubation.    7/25/2022

## 2022-07-25 NOTE — PROGRESS NOTES
07/25/2022  Fitz Christianson    Current provider:  Yg Kaufman MD    Device interrogation:  TXP LVAD INTERROGATIONS 7/25/2022 7/25/2022 7/25/2022 7/25/2022 7/25/2022 7/25/2022 7/25/2022   Type HeartMate3 HeartMate3 HeartMate3 HeartMate3 HeartMate3 HeartMate3 HeartMate3   Flow 5.5 5.5 5.3 5.2 5.4 5.5 5.5   Speed 6400 6400 6400 6400 6400 6400 6400   PI 3.3 3.5 3.5 3.6 3.4 3.3 3.5   Power (Jackson) 5.5 5.4 5.4 5.4 5.5 5.5 5.5   LSL 6000 6000 6000 6000 6000 6000 6000   Low Flow Alarm - - - - - - -   Pulsatility - - - - - - -          Rounded on Tim Richards to ensure all mechanical assist device settings (IABP or VAD) were appropriate and all parameters were within limits.  I was able to ensure all back up equipment was present, the staff had no issues, and the Perfusion Department daily rounding was complete.      For implantable VADs: Interrogation of Ventricular assist device was performed with analysis of device parameters and review of device function. I have personally reviewed the interrogation findings and agree with findings as stated.     In emergency, the nursing units have been notified to contact the perfusion department either by:  Calling [phone] from 630am to 4pm Mon thru Fri, utilizing the On-Call Finder functionality of Epic and searching for Perfusion, or by contacting the hospital  from 4pm to 630am and on weekends and asking to speak with the perfusionist on call.    1:56 PM

## 2022-07-25 NOTE — PLAN OF CARE
Recommendations    1) Continue tube feeds of Peptamen 1.5 with Prebio. As medically feasible, rec'd increasing to goal rate of 60 ml/hr to meet estimated kcal/protein needs - provides 2160 kcal, 98g protein, 1106mL water. Give Beneprotein QID to meet estimated protein needs; 1 packet = 25 kcal, 6g protein for a total of 2260 kcal/day, 122g protein/day.    -As pressor requirements increase, rec'd trickle feeds for best tolerance. Avoid holding TF for residuals less than 500mL unless associated with other s/s of intolerance such as N/V/D, abd pain/distention.     2) RD to monitor and f/u.    Goals: Continue to meet % EEN/EPN by RD f/u date  Nutrition Goal Status: progressing towards goal  Communication of RD Recs: other (POC)

## 2022-07-25 NOTE — ASSESSMENT & PLAN NOTE
Tim Richards is a 56 y.o. male HFrEF with an EF of 10% and a history of HM2 LVAD in 2018 who is now s/p HM3 upgrade, initiation of V-A ECMO after failure to wean off bypass x2      Neuro/Psych:   -- Sedation: Propofol/Precedex.  -- Pain: PRN Dilaudid             Cards:   -- S/P LVAD exchange on 7/14/22  --Improving pressor requirements, wean gwen for MAP > 75, keep other pressors the same   Epi 0.02   Levo off   Vaso 0.04   Giapreza 16  -- Continue Midodrine 15 mg Q8  -- Stress dose steroids complete  -- MAP goal 75  -- VAD  flows stable  -- Decannulated on 7/19  -- Sternal closure on 7/15  -- Bedside drive line removal on 7/23  -- EP consult due to v-tach, discontinue mexiletine   -- Continue lidocaine, continue amio ggt      Pulm:   -- Goal O2 sat > 90%  -- ABG PRN  -- Chest tubes removed  -- Nitric at 0.7 ppm; Goal to wean to off by Monday  -- PAV as tolerated with goal to extubate tomorrow.  -- Trach consult; tentatively planning for Thursday next week      Renal:  -- Keep sun for strict I/O  -- Trend BUN/Cr  -- Lasix ggt 1/hr      FEN / GI:   -- Replace lytes as needed  -- Nutrition: NPO, TF   -- GI ppx: PPI  -- Bowel reg: miralax, docusate, go lytely      ID:   -- Tm: febrile; WBC 24.8 from 27  -- ABX miguel, vanc  -- Cultures sent, repeat cultures sent 7/22, no growth to date  -- BAL GNR --> klebsiella   -- ID consult, recommending transitioning from miguel to ceftriaxone today  -- daily vanc levels  -- Line exchange 7/21      Heme/Onc:   -- H/H stable   -- Daily CBC  -- Received 18 pRBCs, 16 FFP, 2 plt, 25 cryo; 1500 albumin intra-op  -- Heparin gtt at 1800, do not titrate       Endo:   -- BG goal 140-180  -- SSI  -- Levothyroxine      PPx:   Feeding: NPO, TF  Analgesia/Sedation: Precedex / PRN Dilaudid  Thromboembolic prevention: SCDs, heparin gtt  HOB >30: Yes  Stress Ulcer ppx: PPI  Glucose control: Critical care goal 140-180 g/dl, ISS     Lines/Drains/Airway: ETT; OG; RIJ CVC, r-radial  a-line, sun; WV, VAD; midline      Dispo/Code Status/Palliative:   -- SICU / Full Code.

## 2022-07-25 NOTE — ASSESSMENT & PLAN NOTE
-Ischemic ATN 2/2 decrease perfusion severe hypotension in a patient with known CKD 3b  Baseline sCr: 1.9-2.3  On admission his Cr was: 2.5  Lab Results   Component Value Date    CREATININE 1.7 (H) 07/25/2022     7/15 urine microscopy showed: many granular casts, muddy brown casts, waxy casts, some WBCS, some yeast, and occasional RBCs    Recommendations:   -PRN lasix IV Push 80-100mg to maintain pt net -1L/day or at least net even, pt is net +1.5L in the last 24 hours and +2.8L in the last 48 hours. Concentrate IV infusions if possible, and give electrolyte replacement orally to cut down on vol infusing into patient  -corrected Ca >10   -Electrolytes:  ·  K 3.9 keep 4-4.5  ·  Mg 2.8, no replacement needed   -Acid/base: AGMA 2/2 ATN and infectious process   -Fluid balance: pt is 3rd spacing as his albumin is 2   -Anemia: hgb 7.2, transfuse for hgb <7   -Strict I/O's and daily weights  -Renal function panel and Mg levels Q8H   -Renal ultrasound reviewed: no hydronephrosis and minimal CKD changes  -Maintain MAP >65 for renal perfusion    There is no immediate indication for KRT at this time

## 2022-07-25 NOTE — PROGRESS NOTES
"[facility] - Surgical Intensive Care  Infectious Disease  Progress Note    Patient Name: Tim Richards  MRN: [id number]  Admission Date: 6/27/2022  Length of Stay: 28 days  Attending Physician: Yg Kaufman MD  Primary Care Provider: Deyanira Booth MD    Isolation Status: No active isolations  Assessment/Plan:      Shock  Likely multifactorial . On multiple pressor support.   · Management per primary.    Leukocytosis  Leukocytosis with associated fever post-decannulation. Blcx so far ngtd, resp cx unrevealing. Pt is at risk for fungal infection (prior lines, multiple surgeries). S/P HM3 with removal of old driveline. Up-escalated to meropenem due to acute decompensation.     Leukocytosis stable. Sputum cultures with K aerogenes.    · De-escalate meropenem to cefepime.  · Discontinue vancomycin for now. Monitor vancomycin trough.   · Will follow up DLES cultures.         Anticipated Disposition: per primary    Thank you for your consult. I will follow-up with patient. Please contact us if you have any additional questions.    Roxie Garg MD  Infectious Disease  [facility] - Surgical Intensive Care    Subjective:     Principal Problem:Left ventricular assist device (LVAD) complication    HPI: A 56-year-old man with HFrEF-10%, s/p VAD HM2 (March 2018), ICD, VT on amiodarone who developed malaise, anorexia, SOB, dark urine, and soft stools 3-4 days prior to admission. He was evaluated in [facility] ED at which time he tested positive for Covid-19 infection. He was admitted for further management and started on Remdesivir treatment protocol. Since admission, he feels significantly improved with bowel movements returning to normal and the shortness of breath subsiding.     Infectious Diseases consulted for "covid infection, acute. Patient with LVAD"    ID reconsulted 7/21 for "sepsis and consideration for fungemia". Since pt was last seen by ID, patient underwent AV repair, redo sternotomy, explant of old lvad HM2 " with implantation of HM3, and placed on VA-ECMO, takeback 7/14 for mediastinal washout/hematoma, and washout, sternal closure, creation of moises-pericardium (gerotex membrane) and wound vac placement on 7/15. Decannulated ECMO on 7/19 with subsequent fever and hypotension. Pt was placed on broad spectrum abx. Blcx obtained from 7/20 ngtd. S/p bronch on 7/20 - cx with normal respiratory sachin.           Interval History: ". Patient remains intubated and febrile. BAL culture on 7/20 now with K aerogenes. Blood cultures remain NGTD. Yellow mucoid drainage noted at DLES. Brown yellow secretions at chest tube exit site.     Review of Systems   Unable to perform ROS: Intubated   Objective:     Vital Signs (Most Recent):  Temp: 99.5 °F (37.5 °C) (07/25/22 1937)  Pulse: 69 (07/25/22 1937)  Resp: (!) 34 (07/25/22 1936)  BP: (!) 82/0 (07/25/22 1901)  SpO2: (!) 93 % (07/25/22 1901)   Vital Signs (24h Range):  Temp:  [98.06 °F (36.7 °C)-99.5 °F (37.5 °C)] 99.5 °F (37.5 °C)  Pulse:  [] 69  Resp:  [19-34] 34  SpO2:  [93 %-100 %] 93 %  BP: (72-98)/(0-79) 82/0  Arterial Line BP: ()/() 75/67     Weight: 100.7 kg (222 lb)  Body mass index is 29.29 kg/m².    Estimated Creatinine Clearance: 65.1 mL/min (A) (based on SCr of 1.6 mg/dL (H)).    Physical Exam  Vitals and nursing note reviewed. Exam conducted with a chaperone present.   Constitutional:       General: He is sleeping.      Appearance: He is ill-appearing.      Interventions: He is sedated and intubated.   HENT:      Head: Normocephalic and atraumatic.   Eyes:      General: Scleral icterus present.         Right eye: No discharge.         Left eye: No discharge.      Conjunctiva/sclera: Conjunctivae normal.   Cardiovascular:      Pulses: Normal pulses.      Comments: Distant VAD Humming sounds  Pulmonary:      Effort: Pulmonary effort is normal. No respiratory distress. He is intubated.      Breath sounds: No stridor. Rales present. No wheezing or rhonchi.    Abdominal:      General: Bowel sounds are decreased. There is no distension.      Palpations: Abdomen is soft.      Tenderness: There is no abdominal tenderness.          Comments: Left sided DLES covered with gauze dressing soiled with yellow sanguinous secretions.    Musculoskeletal:         General: Normal range of motion.   Skin:     General: Skin is warm and dry.   Neurological:      General: No focal deficit present.      Mental Status: He is easily aroused.      Comments: Awakens with verbal stimuli. Shakes head for yes/no questions.                Significant Labs: Blood Culture:   Recent Labs   Lab 07/20/22  1445 07/20/22  1450 07/22/22  0857 07/22/22  0918   LABBLOO No growth after 5 days. No growth after 5 days. No Growth to date  No Growth to date  No Growth to date  No Growth to date No Growth to date  No Growth to date  No Growth to date  No Growth to date       BMP:   Recent Labs   Lab 07/25/22  1236 07/25/22  1238   GLU  --  108  108   NA  --  139  139   K  --  4.2  4.2   CL  --  107  107   CO2  --  23  23   BUN  --  34*  34*   CREATININE  --  1.6*  1.6*   CALCIUM  --  9.2  9.2   MG 2.6  --        CBC:   Recent Labs   Lab 07/24/22 2010 07/24/22  2153 07/25/22  0401 07/25/22  0818 07/25/22  1236 07/25/22  1323 07/25/22  1943   WBC 26.84*  --  24.84*  --  23.04*  --   --    HGB 7.3*  --  7.2*  --  7.0*  --   --    HCT 23.5*   < > 23.5*   < > 22.6* 22* 22*     --  362  --  379  --   --     < > = values in this interval not displayed.       Respiratory Culture:   Recent Labs   Lab 07/20/22  1120   GSRESP Few WBC's  Rare Gram positive cocci  Rare Gram negative rods       Urine Culture: No results for input(s): LABURIN in the last 4320 hours.  Urine Studies:   Recent Labs   Lab 06/28/22  1137   COLORU Yellow   APPEARANCEUA Clear   PHUR 5.0   SPECGRAV 1.010   PROTEINUA Negative   GLUCUA Negative   KETONESU Negative   BILIRUBINUA Negative   OCCULTUA 2+*   NITRITE Negative    LEUKOCYTESUR Negative   RBCUA 0   WBCUA 2   SQUAMEPITHEL 0   HYALINECASTS 4*       Wound Culture: No results for input(s): LABAERO in the last 4320 hours.    Significant Imaging: I have reviewed all pertinent imaging results/findings within the past 24 hours.

## 2022-07-25 NOTE — PROGRESS NOTES
"[facility]  Nephrology  Progress Note    Patient Name: Tim Richards  MRN: [id number]  Admission Date: 6/27/2022  Hospital Length of Stay: 28 days  Attending Provider: Yg Kaufman MD   Primary Care Physician: Deyanira Booth MD  Principal Problem:Left ventricular assist device (LVAD) complication    Subjective:     HPI:   Tim Richards is a 56 y.o. male w/ Known CKD 3b (without prior nephrology f/u), NICM, end-stage HF, EF 10% s/p HM2 LVAD (2018) and ICD placement (2014), ventricular fibrillation (2020), GI bleed (2019), hypothyroidism, alcohol use (2014), nicotine dependence, and [facility] amitted on 6/27/2022 for evaluation and management of decreased appetite, decreased and dark-colored urine, and increasing shortness of breath.     History obtained from EMR and family at bedside.    In the ED, pt presented with the following VS:   Initial Vitals   BP Pulse Resp Temp SpO2   06/27/22 1129 06/27/22 1038 06/27/22 1038 06/27/22 1038 06/27/22 1810   (!) 94/0 93 20 98 °F (36.7 °C) 95 %      MAP       --                Pt was found to be in acute decompensated heart failure, hypoxic, and severely volume overloaded, for which he was started on BIPAP and on lasix gtt. On 6/30 pt had ventricular tachycardia with ICD shock, this was believed to be 2/2 hypokalemia.   His LVAD was interrogated and there was a concern of LVAD thrombosis and pt was started on Integrilin,however whilke on medical management pt continue with worsening hemolysis and so CTS was consulted and recommended upgrading to HM3 and on 7/13 pt underwent HM 2 explantation, and implantation of HM 3 LVAD. Intraoperatively pt had significant vasoplegic shock and pt was placed on ECMO as well as on vasopressors and steroids for vasodilatory shock. Pt remained intubated postoperatively.   Of note, on admission pt was found to be COVID 19 + and was treated with Remdesivir.     Nephrology was consulted for: "may soon have indication " "for dialysis"  Baseline sCr: 1.9-2.3  On admission his Cr was: 2.5    On 7/13 (day of surgery) his I/Os were as follows: 20L/4.4L, net +15.6L, urine 1965mls and 2.5L from chest tube    On 7/14 to 7/15 his I/Os were: 6.4/5.5L,net 866mls, 1.6L chest tube and 4L urine         Interval History: decannulated from ECMO 07/20/2022, driveline of HM2 removed on 7/22  Afebrile overnight, remains intubated FiO2 50%. Remains on angiotensin, epi gtt, lidocaine gtt, levophed gtt, vasopressin, amiodarone, and heparin gtt    Continues on lasix gtt  1mg/hr   Antibiotics: meropenem   WBC 26->25K  7/20 +VAP Cx Klebsiella aerogenes    On renal Tube feeds    T.bili 12->11.3    sCr 2.1->1.7, BUN 44->35, bicarb 21  I/O: 4.1/2.6L urine and 50ml from drains, net +1.5L in the last 24 hours and +2.8L in the last 48 hours     Family at bedside    Review of patient's allergies indicates:   Allergen Reactions    Lisinopril Anaphylaxis    Hydralazine analogues      Chronic constipation, impotence, dizziness     Current Facility-Administered Medications   Medication Frequency    0.9%  NaCl infusion PRN    0.9%  NaCl infusion PRN    acetylcysteine 100 mg/ml (10%) solution 4 mL TID WAKE    albuterol sulfate nebulizer solution 2.5 mg TID WAKE    amiodarone 360 mg/200 mL (1.8 mg/mL) infusion Continuous    amiodarone tablet 400 mg BID    angiotensin II (GIAPREZA) 2,500,000 ng in sodium chloride 0.9% 250 mL infusion Continuous    aspirin chewable tablet 81 mg Daily    dexmedetomidine (PRECEDEX) 400mcg/100mL 0.9% NaCL infusion Continuous    dextrose 10% bolus 125 mL PRN    dextrose 10% bolus 250 mL PRN    EPINEPHrine (ADRENALIN) 10 mg in dextrose 5 % 250 mL infusion Continuous    furosemide (LASIX) 200 mg in dextrose 5 % 100 mL continuous infusion (conc: 2 mg/mL) Continuous    guaiFENesin 100 mg/5 ml syrup 300 mg Q6H    heparin 25,000 units in dextrose 5% 250 mL (100 units/mL) infusion (heparin infusion - NO NOMOGRAM) Continuous    " HYDROmorphone injection 0.5 mg Q4H PRN    HYDROmorphone injection 1 mg Q4H PRN    levothyroxine tablet 112 mcg Before breakfast    LIDOcaine 2000 mg in D5W 250 mL infusion Continuous    meropenem (MERREM) 2 g in sodium chloride 0.9% 100 mL IVPB Q12H    midodrine tablet 15 mg Q8H    nitric oxide gas Gas 10 ppm Continuous    NORepinephrine 8 mg in dextrose 5% 250 mL infusion Continuous    pantoprazole injection 40 mg BID    polyethylene glycol packet 17 g Daily    propofol (DIPRIVAN) 10 mg/mL infusion Continuous    vancomycin - pharmacy to dose pharmacy to manage frequency    vasopressin (PITRESSIN) 1 Units/mL in dextrose 5 % 100 mL infusion Continuous       Objective:     Vital Signs (Most Recent):  Temp: 98.42 °F (36.9 °C) (07/25/22 0700)  Pulse: 75 (07/25/22 0700)  Resp: 20 (07/25/22 0537)  BP: (!) 75/0 (07/25/22 0315)  SpO2: 100 % (07/25/22 0508)  O2 Device (Oxygen Therapy): ventilator (07/25/22 0700)   Vital Signs (24h Range):  Temp:  [98.06 °F (36.7 °C)-100.04 °F (37.8 °C)] 98.42 °F (36.9 °C)  Pulse:  [58-89] 75  Resp:  [20-28] 20  SpO2:  [99 %-100 %] 100 %  BP: (75-82)/(0) 75/0  Arterial Line BP: ()/() 87/78     Weight: 100.7 kg (222 lb) (07/25/22 0500)  Body mass index is 29.29 kg/m².  Body surface area is 2.28 meters squared.    I/O last 3 completed shifts:  In: 6319.7 [I.V.:3170.9; NG/GT:1090; IV Piggyback:2058.7]  Out: 4180 [Urine:4130; Other:50]    Physical Exam  Vitals and nursing note reviewed.   Constitutional:       General: He is in acute distress.      Appearance: He is ill-appearing. He is not toxic-appearing or diaphoretic.   HENT:      Mouth/Throat:      Mouth: Mucous membranes are moist.   Eyes:      General: No scleral icterus.  Cardiovascular:      Rate and Rhythm: Normal rate and regular rhythm.      Pulses: Normal pulses.      Heart sounds: Murmur heard.      Comments: Intubated   + R IJ Trialysis catheter   Pulmonary:      Effort: Pulmonary effort is normal. No  respiratory distress.      Breath sounds: Normal breath sounds. No stridor. No wheezing, rhonchi or rales.      Comments: Intubated  Chest tube   Abdominal:      General: There is no distension.      Palpations: There is no mass.   Genitourinary:     Comments: Dark yellow colored urine in sun collection bag   Musculoskeletal:         General: Swelling present. No tenderness, deformity or signs of injury.      Right lower leg: Edema present.      Left lower leg: Edema present.   Skin:     General: Skin is warm.      Capillary Refill: Capillary refill takes 2 to 3 seconds.      Coloration: Skin is pale. Skin is not jaundiced.      Findings: No bruising, erythema, lesion or rash.      Comments: +Wound vacc     Neurological:      Comments: Alert, nods to yes and no questions       Significant Labs:  ABGs:   Recent Labs   Lab 07/25/22  0105   PH 7.357   PCO2 43.9   HCO3 24.7   POCSATURATED 98   BE -1       CBC:   Recent Labs   Lab 07/25/22  0401   WBC 24.84*   RBC 2.49*   HGB 7.2*   HCT 23.5*      MCV 94   MCH 28.9   MCHC 30.6*       CMP:   Recent Labs   Lab 07/25/22  0401   *   CALCIUM 9.5   ALBUMIN 2.1*  2.1*   PROT 6.4  6.4      K 3.9   CO2 21*      BUN 35*   CREATININE 1.7*   ALKPHOS 111  111   ALT 37  37   AST 69*  69*   BILITOT 11.3*  11.3*       LFTs:   Recent Labs   Lab 07/25/22  0401   ALT 37  37   AST 69*  69*   ALKPHOS 111  111   BILITOT 11.3*  11.3*   PROT 6.4  6.4   ALBUMIN 2.1*  2.1*       All labs within the past 24 hours have been reviewed.     Significant Imaging:  Labs: Reviewed  X-Ray: Reviewed    Assessment/Plan:     WAGNER (acute kidney injury)  -Ischemic ATN 2/2 decrease perfusion severe hypotension in a patient with known CKD 3b  Baseline sCr: 1.9-2.3  On admission his Cr was: 2.5  Lab Results   Component Value Date    CREATININE 1.7 (H) 07/25/2022     7/15 urine microscopy showed: many granular casts, muddy brown casts, waxy casts, some WBCS, some yeast, and  occasional RBCs    Recommendations:   -PRN lasix IV Push 80-100mg to maintain pt net -1L/day or at least net even, pt is net +1.5L in the last 24 hours and +2.8L in the last 48 hours. Concentrate IV infusions if possible, and give electrolyte replacement orally to cut down on vol infusing into patient  -corrected Ca >10   -Electrolytes:  ·  K 3.9 keep 4-4.5  ·  Mg 2.8, no replacement needed   -Acid/base: AGMA 2/2 ATN and infectious process   -Fluid balance: pt is 3rd spacing as his albumin is 2   -Anemia: hgb 7.2, transfuse for hgb <7   -Strict I/O's and daily weights  -Renal function panel and Mg levels Q8H   -Renal ultrasound reviewed: no hydronephrosis and minimal CKD changes  -Maintain MAP >65 for renal perfusion    There is no immediate indication for KRT at this time      Heart replaced by heart assist device  7/13 pt underwent HM 2 explantation, and implantation of HM 3 LVAD  -Plan per primary team       Leukocytosis  Lab Results   Component Value Date    WBC 23.04 (H) 07/25/2022 7/20 +VAP Cx Klebsiella earogenes   -on meropenem and cefepime  -Plan per primary team           Thank you for your consult. I will sign off. Please contact us if you have any additional questions.    Holly Spangler MD  Nephrology  [facility]

## 2022-07-25 NOTE — NURSING
Pt intubated and sedated. I reviewed DLES pictures today. Gauze is saturated with yellow/browinsh drainage but also now has purulent drainage. RN states that when she is performing the dressing change, drainage constantly comes from DLES. Cultures are being obtained today. ID is also on board and changed IV antibiotics.     VAD interrogation has PI events but no speed drops. VAD numbers remain stable.

## 2022-07-25 NOTE — PROGRESS NOTES
Therapy with vancomycin was discontinued by the provider.  Pharmacy will sign off, please re-consult as needed.    Cecy Lopez, PharmD, BCCCP  [phone]

## 2022-07-25 NOTE — NURSING
2207-  MD Shae notified of EP recommendations, ABG, CVP, uop.  Orders received and implemented for stat ABG with lytes and lactic on a different iSTAT, send lidocaine level, keep magnesium > 2.5, keep K 4.5    2223-  MD Shae notified of ABG.  No new orders.

## 2022-07-25 NOTE — SUBJECTIVE & OBJECTIVE
Interval History:   No acute events overnight. Intubated and able to respond appropriately and follow commands. UOP 1.4+L yesterday.     CVP: 10  UOP: 100 ml/hr    Continuous Infusions:   amiodarone in dextrose 5% 0.5 mg/min (07/25/22 0700)    angiotensin II 16 ng/kg/min (07/25/22 0700)    dexmedetomidine (PRECEDEX) infusion 0.8 mcg/kg/hr (07/25/22 1031)    EPINEPHrine 0.02 mcg/kg/min (07/25/22 0700)    furosemide (LASIX) 2 mg/mL continuous infusion (non-titrating) 1 mg/hr (07/25/22 0700)    heparin (porcine) in 5 % dex 1,800 Units/hr (07/25/22 0700)    LIDOcaine 1 mg/min (07/25/22 0700)    NORepinephrine bitartrate-D5W Stopped (07/25/22 0559)    propofoL Stopped (07/25/22 0600)    vasopressin 0.04 Units/min (07/25/22 0700)     Scheduled Meds:   acetylcysteine 100 mg/ml (10%)  4 mL Nebulization TID WAKE    albuterol sulfate  2.5 mg Nebulization TID WAKE    amiodarone  400 mg Oral BID    aspirin  81 mg Per OG tube Daily    ceFEPime (MAXIPIME) IVPB  2 g Intravenous Q12H    guaiFENesin 100 mg/5 ml  300 mg Per NG tube Q6H    levothyroxine  112 mcg Per OG tube Before breakfast    midodrine  15 mg Per NG tube Q8H    pantoprazole  40 mg Intravenous BID    polyethylene glycol  17 g Per NG tube Daily     PRN Meds:sodium chloride 0.9%, sodium chloride 0.9%, dextrose 10%, dextrose 10%, HYDROmorphone, HYDROmorphone    Review of patient's allergies indicates:   Allergen Reactions    Lisinopril Anaphylaxis    Hydralazine analogues      Chronic constipation, impotence, dizziness     Objective:     Vital Signs (Most Recent):  Temp: 98.96 °F (37.2 °C) (07/25/22 1100)  Pulse: 71 (07/25/22 1100)  Resp: (!) 26 (07/25/22 1100)  BP: (!) 85/74 (07/25/22 1100)  SpO2: 99 % (07/25/22 0806)   Vital Signs (24h Range):  Temp:  [98.06 °F (36.7 °C)-99.14 °F (37.3 °C)] 98.96 °F (37.2 °C)  Pulse:  [51-86] 71  Resp:  [20-27] 26  SpO2:  [99 %-100 %] 99 %  BP: (75-88)/(0-75) 85/74  Arterial Line BP: ()/() 79/67     Patient Vitals for the  past 72 hrs (Last 3 readings):   Weight   07/25/22 0500 100.7 kg (222 lb)   07/24/22 0500 102.1 kg (225 lb)   07/23/22 0500 103.4 kg (228 lb)       Body mass index is 29.29 kg/m².      Intake/Output Summary (Last 24 hours) at 7/25/2022 1149  Last data filed at 7/25/2022 1100  Gross per 24 hour   Intake 3305.26 ml   Output 2625 ml   Net 680.26 ml       Physical Exam  Constitutional:       General: He is not in acute distress.     Appearance: He is obese. He is ill-appearing.      Comments: Intubated   HENT:      Head: Normocephalic.      Nose: Nose normal.      Mouth/Throat:      Mouth: Mucous membranes are moist.   Eyes:      General: Scleral icterus present.   Cardiovascular:      Rate and Rhythm: Normal rate and regular rhythm.      Comments: VAD hum appreciated  Pulses detected via doppler  Pulmonary:      Comments: Coarse mechanical breath sounds bilaterally  Abdominal:      General: Bowel sounds are normal.      Palpations: Abdomen is soft.   Musculoskeletal:      Cervical back: Neck supple.      Right lower leg: No edema.      Left lower leg: No edema.   Skin:     General: Skin is warm.   Neurological:      General: No focal deficit present.       Significant Labs:  CBC:  Recent Labs   Lab 07/24/22  1516 07/24/22 2010 07/24/22  2153 07/25/22  0105 07/25/22  0401 07/25/22  0818   WBC 28.19* 26.84*  --   --  24.84*  --    RBC 2.50* 2.57*  --   --  2.49*  --    HGB 7.3* 7.3*  --   --  7.2*  --    HCT 22.8* 23.5*   < > 23* 23.5* 24*    364  --   --  362  --    MCV 91 91  --   --  94  --    MCH 29.2 28.4  --   --  28.9  --    MCHC 32.0 31.1*  --   --  30.6*  --     < > = values in this interval not displayed.       BNP:  Recent Labs   Lab 07/20/22  0421 07/22/22  0317 07/25/22  0401   * 2,579* 2,301*       CMP:  Recent Labs   Lab 07/24/22  1515 07/24/22 2010 07/25/22  0041 07/25/22  0401   *  127* 119*  --  132*   CALCIUM 9.2  9.2 9.1  --  9.5   ALBUMIN 1.7*  1.7* 1.7*  --  2.1*  2.1*    PROT 6.4  6.4 6.4  --  6.4  6.4     139 141  --  138   K 3.9  3.9 3.6 4.0 3.9   CO2 21*  21* 23  --  21*     107 108  --  106   BUN 38*  38* 37*  --  35*   CREATININE 1.9*  1.9* 1.8*  --  1.7*   ALKPHOS 108  108 115  --  111  111   ALT 36  36 38  --  37  37   AST 69*  69* 72*  --  69*  69*   BILITOT 11.6*  11.6* 12.0*  --  11.3*  11.3*        Coagulation:   Recent Labs   Lab 07/24/22  1516 07/24/22 2010 07/25/22  0401   INR 1.1  1.1 1.1 1.1   APTT 32.8*  32.8* 32.7* 33.0*       LDH:  Recent Labs   Lab 07/23/22  0402 07/24/22  0352 07/25/22  0401   * 566* 381*       Microbiology:  Microbiology Results (last 7 days)       Procedure Component Value Units Date/Time    Blood culture [[id number]] Collected: 07/22/22 0918    Order Status: Completed Specimen: Blood from Peripheral, Hand, Left Updated: 07/25/22 1012     Blood Culture, Routine No Growth to date      No Growth to date      No Growth to date      No Growth to date    Blood culture [[id number]] Collected: 07/22/22 0857    Order Status: Completed Specimen: Blood from Peripheral, Antecubital, Right Updated: 07/25/22 1012     Blood Culture, Routine No Growth to date      No Growth to date      No Growth to date      No Growth to date    Blood culture [[id number]] Collected: 07/20/22 1450    Order Status: Completed Specimen: Blood from Wrist, Left Updated: 07/24/22 1612     Blood Culture, Routine No Growth to date      No Growth to date      No Growth to date      No Growth to date      No Growth to date    Blood culture [[id number]] Collected: 07/20/22 1445    Order Status: Completed Specimen: Blood from Peripheral, Forearm, Left Updated: 07/24/22 1612     Blood Culture, Routine No Growth to date      No Growth to date      No Growth to date      No Growth to date      No Growth to date    Culture, VAP (BAL) [[id number]]  (Abnormal)  (Susceptibility) Collected: 07/20/22 1120    Order Status: Completed Specimen: Bronchial Alveolar  Lavage from BAL, L Updated: 07/23/22 1048     VAP BAL CULTURE KLEBSIELLA AEROGENES  Few  Normal respiratory sachin also present       Gram Stain (Respiratory) Few WBC's     Gram Stain (Respiratory) Rare Gram positive cocci     Gram Stain (Respiratory) Rare Gram negative rods    Blood culture [[id number]]     Order Status: Canceled Specimen: Blood     Blood culture [[id number]]     Order Status: Canceled Specimen: Blood             I have reviewed all pertinent labs within the past 24 hours.    Estimated Creatinine Clearance: 61.3 mL/min (A) (based on SCr of 1.7 mg/dL (H)).    Diagnostic Results:  I have reviewed and interpreted all pertinent imaging results/findings within the past 24 hours.

## 2022-07-25 NOTE — PLAN OF CARE
"      SICU PLAN OF CARE NOTE    Dx: Left ventricular assist device (LVAD) complication    Shift Events: Tolerating tube feedings, increased rate 30 cc/hr  Patient follows commands, moves all extremities.     Goals of Care: continue to meet MAP >70    Neuro: Arouses to Voice    Vital Signs: BP (!) 79/71 (BP Location: Right arm, Patient Position: Lying)   Pulse 61   Temp 99.5 °F (37.5 °C)   Resp (!) 27   Ht 6' 1" (1.854 m)   Wt 100.7 kg (222 lb)   SpO2 99%   BMI 29.29 kg/m²     Respiratory: Ventilator    Diet: tube feeds    Gtts: Precedex, Norepinephrine, Epinephrine, Amiodarone, and Lasix,Heparin, Lidocaine    Urine Output: Urinary Catheter 100-140 cc/hour    Questions and concerns addressed with patient's wife at the bed side. Refer to flow sheet for vital signs, I/Os, and drips.      "

## 2022-07-25 NOTE — CARE UPDATE
During LVAD dressing change large amounts of brown and purulent drainage noted. Dr. Amezcua, Dr. Garg, and LVAD coordinator notified. Refer to flow sheets for vital signs, drips, and assessments. Will continue to monitor.

## 2022-07-25 NOTE — SUBJECTIVE & OBJECTIVE
Interval History: decannulated from ECMO 07/20/2022, driveline of HM2 removed on 7/22  Afebrile overnight, remains intubated FiO2 50%. Remains on angiotensin, epi gtt, lidocaine gtt, levophed gtt, vasopressin, amiodarone, and heparin gtt    Continues on lasix gtt  1mg/hr   Antibiotics: meropenem   WBC 26->25K  7/20 +VAP Cx Klebsiella aerogenes    On renal Tube feeds    T.bili 12->11.3    sCr 2.1->1.7, BUN 44->35, bicarb 21  I/O: 4.1/2.6L urine and 50ml from drains, net +1.5L in the last 24 hours and +2.8L in the last 48 hours     Family at bedside    Review of patient's allergies indicates:   Allergen Reactions    Lisinopril Anaphylaxis    Hydralazine analogues      Chronic constipation, impotence, dizziness     Current Facility-Administered Medications   Medication Frequency    0.9%  NaCl infusion PRN    0.9%  NaCl infusion PRN    acetylcysteine 100 mg/ml (10%) solution 4 mL TID WAKE    albuterol sulfate nebulizer solution 2.5 mg TID WAKE    amiodarone 360 mg/200 mL (1.8 mg/mL) infusion Continuous    amiodarone tablet 400 mg BID    angiotensin II (GIAPREZA) 2,500,000 ng in sodium chloride 0.9% 250 mL infusion Continuous    aspirin chewable tablet 81 mg Daily    dexmedetomidine (PRECEDEX) 400mcg/100mL 0.9% NaCL infusion Continuous    dextrose 10% bolus 125 mL PRN    dextrose 10% bolus 250 mL PRN    EPINEPHrine (ADRENALIN) 10 mg in dextrose 5 % 250 mL infusion Continuous    furosemide (LASIX) 200 mg in dextrose 5 % 100 mL continuous infusion (conc: 2 mg/mL) Continuous    guaiFENesin 100 mg/5 ml syrup 300 mg Q6H    heparin 25,000 units in dextrose 5% 250 mL (100 units/mL) infusion (heparin infusion - NO NOMOGRAM) Continuous    HYDROmorphone injection 0.5 mg Q4H PRN    HYDROmorphone injection 1 mg Q4H PRN    levothyroxine tablet 112 mcg Before breakfast    LIDOcaine 2000 mg in D5W 250 mL infusion Continuous    meropenem (MERREM) 2 g in sodium chloride 0.9% 100 mL IVPB Q12H    midodrine tablet 15 mg Q8H    nitric oxide  gas Gas 10 ppm Continuous    NORepinephrine 8 mg in dextrose 5% 250 mL infusion Continuous    pantoprazole injection 40 mg BID    polyethylene glycol packet 17 g Daily    propofol (DIPRIVAN) 10 mg/mL infusion Continuous    vancomycin - pharmacy to dose pharmacy to manage frequency    vasopressin (PITRESSIN) 1 Units/mL in dextrose 5 % 100 mL infusion Continuous       Objective:     Vital Signs (Most Recent):  Temp: 98.42 °F (36.9 °C) (07/25/22 0700)  Pulse: 75 (07/25/22 0700)  Resp: 20 (07/25/22 0537)  BP: (!) 75/0 (07/25/22 0315)  SpO2: 100 % (07/25/22 0508)  O2 Device (Oxygen Therapy): ventilator (07/25/22 0700)   Vital Signs (24h Range):  Temp:  [98.06 °F (36.7 °C)-100.04 °F (37.8 °C)] 98.42 °F (36.9 °C)  Pulse:  [58-89] 75  Resp:  [20-28] 20  SpO2:  [99 %-100 %] 100 %  BP: (75-82)/(0) 75/0  Arterial Line BP: ()/() 87/78     Weight: 100.7 kg (222 lb) (07/25/22 0500)  Body mass index is 29.29 kg/m².  Body surface area is 2.28 meters squared.    I/O last 3 completed shifts:  In: 6319.7 [I.V.:3170.9; NG/GT:1090; IV Piggyback:2058.7]  Out: 4180 [Urine:4130; Other:50]    Physical Exam  Vitals and nursing note reviewed.   Constitutional:       General: He is in acute distress.      Appearance: He is ill-appearing. He is not toxic-appearing or diaphoretic.   HENT:      Mouth/Throat:      Mouth: Mucous membranes are moist.   Eyes:      General: No scleral icterus.  Cardiovascular:      Rate and Rhythm: Normal rate and regular rhythm.      Pulses: Normal pulses.      Heart sounds: Murmur heard.      Comments: Intubated   + R IJ Trialysis catheter   Pulmonary:      Effort: Pulmonary effort is normal. No respiratory distress.      Breath sounds: Normal breath sounds. No stridor. No wheezing, rhonchi or rales.      Comments: Intubated  Chest tube   Abdominal:      General: There is no distension.      Palpations: There is no mass.   Genitourinary:     Comments: Dark yellow colored urine in sun collection bag    Musculoskeletal:         General: Swelling present. No tenderness, deformity or signs of injury.      Right lower leg: Edema present.      Left lower leg: Edema present.   Skin:     General: Skin is warm.      Capillary Refill: Capillary refill takes 2 to 3 seconds.      Coloration: Skin is pale. Skin is not jaundiced.      Findings: No bruising, erythema, lesion or rash.      Comments: +Wound vacc     Neurological:      Comments: Alert, nods to yes and no questions       Significant Labs:  ABGs:   Recent Labs   Lab 07/25/22  0105   PH 7.357   PCO2 43.9   HCO3 24.7   POCSATURATED 98   BE -1       CBC:   Recent Labs   Lab 07/25/22  0401   WBC 24.84*   RBC 2.49*   HGB 7.2*   HCT 23.5*      MCV 94   MCH 28.9   MCHC 30.6*       CMP:   Recent Labs   Lab 07/25/22  0401   *   CALCIUM 9.5   ALBUMIN 2.1*  2.1*   PROT 6.4  6.4      K 3.9   CO2 21*      BUN 35*   CREATININE 1.7*   ALKPHOS 111  111   ALT 37  37   AST 69*  69*   BILITOT 11.3*  11.3*       LFTs:   Recent Labs   Lab 07/25/22  0401   ALT 37  37   AST 69*  69*   ALKPHOS 111  111   BILITOT 11.3*  11.3*   PROT 6.4  6.4   ALBUMIN 2.1*  2.1*       All labs within the past 24 hours have been reviewed.     Significant Imaging:  Labs: Reviewed  X-Ray: Reviewed

## 2022-07-26 LAB
ALBUMIN SERPL BCP-MCNC: 2.2 G/DL (ref 3.5–5.2)
ALLENS TEST: ABNORMAL
ALLENS TEST: NORMAL
ALP SERPL-CCNC: 125 U/L (ref 55–135)
ALP SERPL-CCNC: 142 U/L (ref 55–135)
ALP SERPL-CCNC: 145 U/L (ref 55–135)
ALT SERPL W/O P-5'-P-CCNC: 40 U/L (ref 10–44)
ALT SERPL W/O P-5'-P-CCNC: 43 U/L (ref 10–44)
ALT SERPL W/O P-5'-P-CCNC: 48 U/L (ref 10–44)
ANION GAP SERPL CALC-SCNC: 10 MMOL/L (ref 8–16)
ANION GAP SERPL CALC-SCNC: 9 MMOL/L (ref 8–16)
ANION GAP SERPL CALC-SCNC: 9 MMOL/L (ref 8–16)
APTT BLDCRRT: 28.3 SEC (ref 21–32)
APTT BLDCRRT: 30.8 SEC (ref 21–32)
APTT BLDCRRT: 34 SEC (ref 21–32)
AST SERPL-CCNC: 74 U/L (ref 10–40)
AST SERPL-CCNC: 81 U/L (ref 10–40)
AST SERPL-CCNC: 89 U/L (ref 10–40)
BASOPHILS # BLD AUTO: 0.03 K/UL (ref 0–0.2)
BASOPHILS NFR BLD: 0.1 % (ref 0–1.9)
BASOPHILS NFR BLD: 0.2 % (ref 0–1.9)
BASOPHILS NFR BLD: 0.2 % (ref 0–1.9)
BILIRUB SERPL-MCNC: 10.1 MG/DL (ref 0.1–1)
BILIRUB SERPL-MCNC: 10.5 MG/DL (ref 0.1–1)
BILIRUB SERPL-MCNC: 10.7 MG/DL (ref 0.1–1)
BLD PROD TYP BPU: NORMAL
BLOOD UNIT EXPIRATION DATE: NORMAL
BLOOD UNIT TYPE CODE: 5100
BLOOD UNIT TYPE: NORMAL
BUN SERPL-MCNC: 32 MG/DL (ref 6–20)
BUN SERPL-MCNC: 33 MG/DL (ref 6–20)
BUN SERPL-MCNC: 33 MG/DL (ref 6–20)
CALCIUM SERPL-MCNC: 8.9 MG/DL (ref 8.7–10.5)
CALCIUM SERPL-MCNC: 9 MG/DL (ref 8.7–10.5)
CALCIUM SERPL-MCNC: 9.1 MG/DL (ref 8.7–10.5)
CHLORIDE SERPL-SCNC: 107 MMOL/L (ref 95–110)
CHLORIDE SERPL-SCNC: 108 MMOL/L (ref 95–110)
CHLORIDE SERPL-SCNC: 108 MMOL/L (ref 95–110)
CO2 SERPL-SCNC: 22 MMOL/L (ref 23–29)
CO2 SERPL-SCNC: 22 MMOL/L (ref 23–29)
CO2 SERPL-SCNC: 23 MMOL/L (ref 23–29)
CODING SYSTEM: NORMAL
CREAT SERPL-MCNC: 1.4 MG/DL (ref 0.5–1.4)
DELSYS: ABNORMAL
DELSYS: NORMAL
DIFFERENTIAL METHOD: ABNORMAL
DISPENSE STATUS: NORMAL
EOSINOPHIL # BLD AUTO: 0 K/UL (ref 0–0.5)
EOSINOPHIL # BLD AUTO: 0.1 K/UL (ref 0–0.5)
EOSINOPHIL # BLD AUTO: 0.1 K/UL (ref 0–0.5)
EOSINOPHIL NFR BLD: 0.2 % (ref 0–8)
EOSINOPHIL NFR BLD: 0.3 % (ref 0–8)
EOSINOPHIL NFR BLD: 0.3 % (ref 0–8)
ERYTHROCYTE [DISTWIDTH] IN BLOOD BY AUTOMATED COUNT: 20.1 % (ref 11.5–14.5)
ERYTHROCYTE [DISTWIDTH] IN BLOOD BY AUTOMATED COUNT: 20.8 % (ref 11.5–14.5)
ERYTHROCYTE [DISTWIDTH] IN BLOOD BY AUTOMATED COUNT: 20.8 % (ref 11.5–14.5)
ERYTHROCYTE [SEDIMENTATION RATE] IN BLOOD BY WESTERGREN METHOD: 16 MM/H
EST. GFR  (AFRICAN AMERICAN): >60 ML/MIN/1.73 M^2
EST. GFR  (NON AFRICAN AMERICAN): 56.2 ML/MIN/1.73 M^2
FACT X PPP CHRO-ACNC: 0.16 IU/ML (ref 0.3–0.7)
FACT X PPP CHRO-ACNC: 0.22 IU/ML (ref 0.3–0.7)
FACT X PPP CHRO-ACNC: 0.23 IU/ML (ref 0.3–0.7)
FIBRINOGEN PPP-MCNC: 754 MG/DL (ref 182–400)
FIBRINOGEN PPP-MCNC: >800 MG/DL (ref 182–400)
FIO2: 50
FLOW: 10
FLOW: 10
GLUCOSE SERPL-MCNC: 118 MG/DL (ref 70–110)
GLUCOSE SERPL-MCNC: 119 MG/DL (ref 70–110)
GLUCOSE SERPL-MCNC: 127 MG/DL (ref 70–110)
GRAM STN SPEC: NORMAL
GRAM STN SPEC: NORMAL
HCO3 UR-SCNC: 18.2 MMOL/L (ref 24–28)
HCO3 UR-SCNC: 21.4 MMOL/L (ref 24–28)
HCO3 UR-SCNC: 21.4 MMOL/L (ref 24–28)
HCO3 UR-SCNC: 23.8 MMOL/L (ref 24–28)
HCO3 UR-SCNC: 25.4 MMOL/L (ref 24–28)
HCT VFR BLD AUTO: 21.7 % (ref 40–54)
HCT VFR BLD AUTO: 24.1 % (ref 40–54)
HCT VFR BLD AUTO: 25.2 % (ref 40–54)
HCT VFR BLD CALC: 21 %PCV (ref 36–54)
HCT VFR BLD CALC: 23 %PCV (ref 36–54)
HCT VFR BLD CALC: 23 %PCV (ref 36–54)
HCT VFR BLD CALC: 25 %PCV (ref 36–54)
HCT VFR BLD CALC: 25 %PCV (ref 36–54)
HGB BLD-MCNC: 6.7 G/DL (ref 14–18)
HGB BLD-MCNC: 7.6 G/DL (ref 14–18)
HGB BLD-MCNC: 7.8 G/DL (ref 14–18)
IMM GRANULOCYTES # BLD AUTO: 0.78 K/UL (ref 0–0.04)
IMM GRANULOCYTES # BLD AUTO: 0.84 K/UL (ref 0–0.04)
IMM GRANULOCYTES # BLD AUTO: 0.85 K/UL (ref 0–0.04)
IMM GRANULOCYTES NFR BLD AUTO: 3.7 % (ref 0–0.5)
IMM GRANULOCYTES NFR BLD AUTO: 4 % (ref 0–0.5)
IMM GRANULOCYTES NFR BLD AUTO: 4.6 % (ref 0–0.5)
INR PPP: 1.1 (ref 0.8–1.2)
INR PPP: 1.1 (ref 0.8–1.2)
INR PPP: 1.2 (ref 0.8–1.2)
LDH SERPL L TO P-CCNC: 0.67 MMOL/L (ref 0.36–1.25)
LDH SERPL L TO P-CCNC: 0.73 MMOL/L (ref 0.36–1.25)
LDH SERPL L TO P-CCNC: 0.75 MMOL/L (ref 0.36–1.25)
LDH SERPL L TO P-CCNC: 326 U/L (ref 110–260)
LIDOCAIN SERPL-MCNC: 2.8 MCG/ML (ref 1.5–5)
LYMPHOCYTES # BLD AUTO: 0.7 K/UL (ref 1–4.8)
LYMPHOCYTES # BLD AUTO: 0.8 K/UL (ref 1–4.8)
LYMPHOCYTES # BLD AUTO: 1.3 K/UL (ref 1–4.8)
LYMPHOCYTES NFR BLD: 3.7 % (ref 18–48)
LYMPHOCYTES NFR BLD: 4.1 % (ref 18–48)
LYMPHOCYTES NFR BLD: 5.5 % (ref 18–48)
MAGNESIUM SERPL-MCNC: 2.3 MG/DL (ref 1.6–2.6)
MAGNESIUM SERPL-MCNC: 2.5 MG/DL (ref 1.6–2.6)
MAGNESIUM SERPL-MCNC: 2.6 MG/DL (ref 1.6–2.6)
MCH RBC QN AUTO: 29.4 PG (ref 27–31)
MCH RBC QN AUTO: 29.5 PG (ref 27–31)
MCH RBC QN AUTO: 29.6 PG (ref 27–31)
MCHC RBC AUTO-ENTMCNC: 30.9 G/DL (ref 32–36)
MCHC RBC AUTO-ENTMCNC: 31 G/DL (ref 32–36)
MCHC RBC AUTO-ENTMCNC: 31.5 G/DL (ref 32–36)
MCV RBC AUTO: 94 FL (ref 82–98)
MCV RBC AUTO: 95 FL (ref 82–98)
MCV RBC AUTO: 96 FL (ref 82–98)
MODE: ABNORMAL
MODE: NORMAL
MONOCYTES # BLD AUTO: 1.4 K/UL (ref 0.3–1)
MONOCYTES # BLD AUTO: 1.5 K/UL (ref 0.3–1)
MONOCYTES # BLD AUTO: 1.8 K/UL (ref 0.3–1)
MONOCYTES NFR BLD: 7.5 % (ref 4–15)
MONOCYTES NFR BLD: 7.5 % (ref 4–15)
MONOCYTES NFR BLD: 8 % (ref 4–15)
NEUTROPHILS # BLD AUTO: 15.2 K/UL (ref 1.8–7.7)
NEUTROPHILS # BLD AUTO: 16.6 K/UL (ref 1.8–7.7)
NEUTROPHILS # BLD AUTO: 18.8 K/UL (ref 1.8–7.7)
NEUTROPHILS NFR BLD: 82.4 % (ref 38–73)
NEUTROPHILS NFR BLD: 83.8 % (ref 38–73)
NEUTROPHILS NFR BLD: 83.9 % (ref 38–73)
NRBC BLD-RTO: 0 /100 WBC
NRBC BLD-RTO: 1 /100 WBC
NRBC BLD-RTO: 1 /100 WBC
NUM UNITS TRANS PACKED RBC: NORMAL
PCO2 BLDA: 28.6 MMHG (ref 35–45)
PCO2 BLDA: 33.7 MMHG (ref 35–45)
PCO2 BLDA: 37.3 MMHG (ref 35–45)
PCO2 BLDA: 40.1 MMHG (ref 35–45)
PCO2 BLDA: 44.1 MMHG (ref 35–45)
PEEP: 5
PH SMN: 7.37 [PH] (ref 7.35–7.45)
PH SMN: 7.37 [PH] (ref 7.35–7.45)
PH SMN: 7.38 [PH] (ref 7.35–7.45)
PH SMN: 7.41 [PH] (ref 7.35–7.45)
PH SMN: 7.41 [PH] (ref 7.35–7.45)
PHOSPHATE SERPL-MCNC: 2.3 MG/DL (ref 2.7–4.5)
PHOSPHATE SERPL-MCNC: 2.6 MG/DL (ref 2.7–4.5)
PHOSPHATE SERPL-MCNC: 3.1 MG/DL (ref 2.7–4.5)
PLATELET # BLD AUTO: 384 K/UL (ref 150–450)
PLATELET # BLD AUTO: 417 K/UL (ref 150–450)
PLATELET # BLD AUTO: 472 K/UL (ref 150–450)
PMV BLD AUTO: 11.6 FL (ref 9.2–12.9)
PMV BLD AUTO: 11.9 FL (ref 9.2–12.9)
PMV BLD AUTO: 12 FL (ref 9.2–12.9)
PO2 BLDA: 108 MMHG (ref 80–100)
PO2 BLDA: 63 MMHG (ref 80–100)
PO2 BLDA: 65 MMHG (ref 80–100)
PO2 BLDA: 77 MMHG (ref 80–100)
PO2 BLDA: 91 MMHG (ref 80–100)
POC BE: -1 MMOL/L
POC BE: -3 MMOL/L
POC BE: -4 MMOL/L
POC BE: -6 MMOL/L
POC BE: 0 MMOL/L
POC IONIZED CALCIUM: 1.13 MMOL/L (ref 1.06–1.42)
POC IONIZED CALCIUM: 1.18 MMOL/L (ref 1.06–1.42)
POC IONIZED CALCIUM: 1.21 MMOL/L (ref 1.06–1.42)
POC SATURATED O2: 91 % (ref 95–100)
POC SATURATED O2: 92 % (ref 95–100)
POC SATURATED O2: 95 % (ref 95–100)
POC SATURATED O2: 97 % (ref 95–100)
POC SATURATED O2: 98 % (ref 95–100)
POC TCO2: 19 MMOL/L (ref 23–27)
POC TCO2: 22 MMOL/L (ref 23–27)
POC TCO2: 23 MMOL/L (ref 23–27)
POC TCO2: 25 MMOL/L (ref 23–27)
POC TCO2: 27 MMOL/L (ref 23–27)
POTASSIUM BLD-SCNC: 3.4 MMOL/L (ref 3.5–5.1)
POTASSIUM BLD-SCNC: 3.6 MMOL/L (ref 3.5–5.1)
POTASSIUM BLD-SCNC: 3.6 MMOL/L (ref 3.5–5.1)
POTASSIUM SERPL-SCNC: 3.9 MMOL/L (ref 3.5–5.1)
POTASSIUM SERPL-SCNC: 4 MMOL/L (ref 3.5–5.1)
POTASSIUM SERPL-SCNC: 4.1 MMOL/L (ref 3.5–5.1)
PROT SERPL-MCNC: 6.4 G/DL (ref 6–8.4)
PROT SERPL-MCNC: 6.6 G/DL (ref 6–8.4)
PROT SERPL-MCNC: 6.6 G/DL (ref 6–8.4)
PROTHROMBIN TIME: 11.4 SEC (ref 9–12.5)
PROTHROMBIN TIME: 11.5 SEC (ref 9–12.5)
PROTHROMBIN TIME: 12 SEC (ref 9–12.5)
RBC # BLD AUTO: 2.28 M/UL (ref 4.6–6.2)
RBC # BLD AUTO: 2.57 M/UL (ref 4.6–6.2)
RBC # BLD AUTO: 2.64 M/UL (ref 4.6–6.2)
SAMPLE: ABNORMAL
SAMPLE: NORMAL
SITE: ABNORMAL
SITE: NORMAL
SODIUM BLD-SCNC: 144 MMOL/L (ref 136–145)
SODIUM BLD-SCNC: 145 MMOL/L (ref 136–145)
SODIUM BLD-SCNC: 145 MMOL/L (ref 136–145)
SODIUM SERPL-SCNC: 138 MMOL/L (ref 136–145)
SODIUM SERPL-SCNC: 140 MMOL/L (ref 136–145)
SODIUM SERPL-SCNC: 140 MMOL/L (ref 136–145)
VT: 450
WBC # BLD AUTO: 18.18 K/UL (ref 3.9–12.7)
WBC # BLD AUTO: 19.73 K/UL (ref 3.9–12.7)
WBC # BLD AUTO: 22.84 K/UL (ref 3.9–12.7)

## 2022-07-26 PROCEDURE — 99233 PR SUBSEQUENT HOSPITAL CARE,LEVL III: ICD-10-PCS | Mod: ,,, | Performed by: INTERNAL MEDICINE

## 2022-07-26 PROCEDURE — 85520 HEPARIN ASSAY: CPT

## 2022-07-26 PROCEDURE — 93750 PR INTERROGATE VENT ASSIST DEV, IN PERSON, W PHYSICIAN ANALYSIS: ICD-10-PCS | Mod: ,,, | Performed by: INTERNAL MEDICINE

## 2022-07-26 PROCEDURE — 27200966 HC CLOSED SUCTION SYSTEM

## 2022-07-26 PROCEDURE — 94010 BREATHING CAPACITY TEST: CPT

## 2022-07-26 PROCEDURE — 97112 NEUROMUSCULAR REEDUCATION: CPT

## 2022-07-26 PROCEDURE — 87040 BLOOD CULTURE FOR BACTERIA: CPT | Performed by: THORACIC SURGERY (CARDIOTHORACIC VASCULAR SURGERY)

## 2022-07-26 PROCEDURE — 25000003 PHARM REV CODE 250

## 2022-07-26 PROCEDURE — 99233 SBSQ HOSP IP/OBS HIGH 50: CPT | Mod: ,,, | Performed by: ANESTHESIOLOGY

## 2022-07-26 PROCEDURE — 85384 FIBRINOGEN ACTIVITY: CPT | Performed by: THORACIC SURGERY (CARDIOTHORACIC VASCULAR SURGERY)

## 2022-07-26 PROCEDURE — 83735 ASSAY OF MAGNESIUM: CPT | Performed by: THORACIC SURGERY (CARDIOTHORACIC VASCULAR SURGERY)

## 2022-07-26 PROCEDURE — 25000003 PHARM REV CODE 250: Performed by: PHYSICIAN ASSISTANT

## 2022-07-26 PROCEDURE — 25000003 PHARM REV CODE 250: Performed by: STUDENT IN AN ORGANIZED HEALTH CARE EDUCATION/TRAINING PROGRAM

## 2022-07-26 PROCEDURE — 87070 CULTURE OTHR SPECIMN AEROBIC: CPT | Performed by: THORACIC SURGERY (CARDIOTHORACIC VASCULAR SURGERY)

## 2022-07-26 PROCEDURE — 80053 COMPREHEN METABOLIC PANEL: CPT | Mod: 91 | Performed by: THORACIC SURGERY (CARDIOTHORACIC VASCULAR SURGERY)

## 2022-07-26 PROCEDURE — 27000221 HC OXYGEN, UP TO 24 HOURS

## 2022-07-26 PROCEDURE — 63600175 PHARM REV CODE 636 W HCPCS: Performed by: THORACIC SURGERY (CARDIOTHORACIC VASCULAR SURGERY)

## 2022-07-26 PROCEDURE — 63600175 PHARM REV CODE 636 W HCPCS

## 2022-07-26 PROCEDURE — 85610 PROTHROMBIN TIME: CPT | Mod: 91 | Performed by: THORACIC SURGERY (CARDIOTHORACIC VASCULAR SURGERY)

## 2022-07-26 PROCEDURE — 87205 SMEAR GRAM STAIN: CPT | Performed by: THORACIC SURGERY (CARDIOTHORACIC VASCULAR SURGERY)

## 2022-07-26 PROCEDURE — 20000000 HC ICU ROOM

## 2022-07-26 PROCEDURE — 94150 VITAL CAPACITY TEST: CPT

## 2022-07-26 PROCEDURE — 27100171 HC OXYGEN HIGH FLOW UP TO 24 HOURS

## 2022-07-26 PROCEDURE — 99900026 HC AIRWAY MAINTENANCE (STAT)

## 2022-07-26 PROCEDURE — 94640 AIRWAY INHALATION TREATMENT: CPT

## 2022-07-26 PROCEDURE — 99291 PR CRITICAL CARE, E/M 30-74 MINUTES: ICD-10-PCS | Mod: 25,,, | Performed by: INTERNAL MEDICINE

## 2022-07-26 PROCEDURE — 82330 ASSAY OF CALCIUM: CPT

## 2022-07-26 PROCEDURE — 85730 THROMBOPLASTIN TIME PARTIAL: CPT | Mod: 91 | Performed by: THORACIC SURGERY (CARDIOTHORACIC VASCULAR SURGERY)

## 2022-07-26 PROCEDURE — 87186 SC STD MICRODIL/AGAR DIL: CPT | Performed by: THORACIC SURGERY (CARDIOTHORACIC VASCULAR SURGERY)

## 2022-07-26 PROCEDURE — 85520 HEPARIN ASSAY: CPT | Mod: 91 | Performed by: THORACIC SURGERY (CARDIOTHORACIC VASCULAR SURGERY)

## 2022-07-26 PROCEDURE — 85014 HEMATOCRIT: CPT

## 2022-07-26 PROCEDURE — 94003 VENT MGMT INPAT SUBQ DAY: CPT

## 2022-07-26 PROCEDURE — 25000242 PHARM REV CODE 250 ALT 637 W/ HCPCS: Performed by: THORACIC SURGERY (CARDIOTHORACIC VASCULAR SURGERY)

## 2022-07-26 PROCEDURE — 94799 UNLISTED PULMONARY SVC/PX: CPT

## 2022-07-26 PROCEDURE — 99900035 HC TECH TIME PER 15 MIN (STAT)

## 2022-07-26 PROCEDURE — 97168 OT RE-EVAL EST PLAN CARE: CPT

## 2022-07-26 PROCEDURE — 99291 CRITICAL CARE FIRST HOUR: CPT | Mod: 25,,, | Performed by: INTERNAL MEDICINE

## 2022-07-26 PROCEDURE — 85025 COMPLETE CBC W/AUTO DIFF WBC: CPT | Mod: 91 | Performed by: THORACIC SURGERY (CARDIOTHORACIC VASCULAR SURGERY)

## 2022-07-26 PROCEDURE — 25000242 PHARM REV CODE 250 ALT 637 W/ HCPCS: Performed by: STUDENT IN AN ORGANIZED HEALTH CARE EDUCATION/TRAINING PROGRAM

## 2022-07-26 PROCEDURE — 83615 LACTATE (LD) (LDH) ENZYME: CPT | Performed by: STUDENT IN AN ORGANIZED HEALTH CARE EDUCATION/TRAINING PROGRAM

## 2022-07-26 PROCEDURE — 93750 INTERROGATION VAD IN PERSON: CPT | Mod: ,,, | Performed by: INTERNAL MEDICINE

## 2022-07-26 PROCEDURE — 84132 ASSAY OF SERUM POTASSIUM: CPT

## 2022-07-26 PROCEDURE — 85520 HEPARIN ASSAY: CPT | Mod: 91

## 2022-07-26 PROCEDURE — 99233 SBSQ HOSP IP/OBS HIGH 50: CPT | Mod: ,,, | Performed by: INTERNAL MEDICINE

## 2022-07-26 PROCEDURE — 25000003 PHARM REV CODE 250: Performed by: THORACIC SURGERY (CARDIOTHORACIC VASCULAR SURGERY)

## 2022-07-26 PROCEDURE — 84295 ASSAY OF SERUM SODIUM: CPT

## 2022-07-26 PROCEDURE — 84100 ASSAY OF PHOSPHORUS: CPT | Mod: 91 | Performed by: THORACIC SURGERY (CARDIOTHORACIC VASCULAR SURGERY)

## 2022-07-26 PROCEDURE — 63600175 PHARM REV CODE 636 W HCPCS: Performed by: STUDENT IN AN ORGANIZED HEALTH CARE EDUCATION/TRAINING PROGRAM

## 2022-07-26 PROCEDURE — P9016 RBC LEUKOCYTES REDUCED: HCPCS

## 2022-07-26 PROCEDURE — 82803 BLOOD GASES ANY COMBINATION: CPT

## 2022-07-26 PROCEDURE — 97530 THERAPEUTIC ACTIVITIES: CPT

## 2022-07-26 PROCEDURE — 27000646 HC AEROBIKA DEVICE

## 2022-07-26 PROCEDURE — 83605 ASSAY OF LACTIC ACID: CPT

## 2022-07-26 PROCEDURE — C9113 INJ PANTOPRAZOLE SODIUM, VIA: HCPCS

## 2022-07-26 PROCEDURE — 99233 PR SUBSEQUENT HOSPITAL CARE,LEVL III: ICD-10-PCS | Mod: ,,, | Performed by: ANESTHESIOLOGY

## 2022-07-26 PROCEDURE — 97164 PT RE-EVAL EST PLAN CARE: CPT

## 2022-07-26 PROCEDURE — 94664 DEMO&/EVAL PT USE INHALER: CPT

## 2022-07-26 PROCEDURE — 87077 CULTURE AEROBIC IDENTIFY: CPT | Performed by: THORACIC SURGERY (CARDIOTHORACIC VASCULAR SURGERY)

## 2022-07-26 PROCEDURE — 94761 N-INVAS EAR/PLS OXIMETRY MLT: CPT

## 2022-07-26 PROCEDURE — 37799 UNLISTED PX VASCULAR SURGERY: CPT

## 2022-07-26 RX ORDER — HYDROCODONE BITARTRATE AND ACETAMINOPHEN 500; 5 MG/1; MG/1
TABLET ORAL
Status: DISCONTINUED | OUTPATIENT
Start: 2022-07-26 | End: 2022-08-03

## 2022-07-26 RX ORDER — POTASSIUM CHLORIDE 29.8 MG/ML
40 INJECTION INTRAVENOUS ONCE
Status: COMPLETED | OUTPATIENT
Start: 2022-07-26 | End: 2022-07-26

## 2022-07-26 RX ORDER — ACETYLCYSTEINE 100 MG/ML
4 SOLUTION ORAL; RESPIRATORY (INHALATION) EVERY 6 HOURS
Status: DISCONTINUED | OUTPATIENT
Start: 2022-07-26 | End: 2022-08-18

## 2022-07-26 RX ORDER — FUROSEMIDE 10 MG/ML
10 INJECTION INTRAMUSCULAR; INTRAVENOUS ONCE
Status: COMPLETED | OUTPATIENT
Start: 2022-07-26 | End: 2022-07-26

## 2022-07-26 RX ORDER — MAGNESIUM SULFATE HEPTAHYDRATE 40 MG/ML
2 INJECTION, SOLUTION INTRAVENOUS ONCE
Status: COMPLETED | OUTPATIENT
Start: 2022-07-26 | End: 2022-07-27

## 2022-07-26 RX ORDER — HEPARIN SODIUM 10000 [USP'U]/100ML
2200 INJECTION, SOLUTION INTRAVENOUS CONTINUOUS
Status: DISCONTINUED | OUTPATIENT
Start: 2022-07-26 | End: 2022-07-29

## 2022-07-26 RX ORDER — TALC
6 POWDER (GRAM) TOPICAL ONCE
Status: COMPLETED | OUTPATIENT
Start: 2022-07-26 | End: 2022-07-26

## 2022-07-26 RX ORDER — WARFARIN 4 MG/1
4 TABLET ORAL DAILY
Status: DISCONTINUED | OUTPATIENT
Start: 2022-07-26 | End: 2022-07-29

## 2022-07-26 RX ORDER — ALBUTEROL SULFATE 2.5 MG/.5ML
2.5 SOLUTION RESPIRATORY (INHALATION) EVERY 4 HOURS PRN
Status: DISCONTINUED | OUTPATIENT
Start: 2022-07-26 | End: 2022-07-28

## 2022-07-26 RX ORDER — ACETAMINOPHEN 650 MG/20.3ML
1000 LIQUID ORAL EVERY 8 HOURS PRN
Status: DISCONTINUED | OUTPATIENT
Start: 2022-07-26 | End: 2022-08-23

## 2022-07-26 RX ORDER — ACETAMINOPHEN 325 MG/1
650 TABLET ORAL ONCE
Status: COMPLETED | OUTPATIENT
Start: 2022-07-26 | End: 2022-07-26

## 2022-07-26 RX ORDER — AMIODARONE HYDROCHLORIDE 200 MG/1
400 TABLET ORAL 2 TIMES DAILY
Status: DISCONTINUED | OUTPATIENT
Start: 2022-07-26 | End: 2022-07-28

## 2022-07-26 RX ORDER — POTASSIUM CHLORIDE 29.8 MG/ML
40 INJECTION INTRAVENOUS ONCE
Status: COMPLETED | OUTPATIENT
Start: 2022-07-26 | End: 2022-07-27

## 2022-07-26 RX ADMIN — POTASSIUM BICARBONATE 40 MEQ: 391 TABLET, EFFERVESCENT ORAL at 02:07

## 2022-07-26 RX ADMIN — GUAIFENESIN 300 MG: 100 SOLUTION ORAL at 05:07

## 2022-07-26 RX ADMIN — HYDROMORPHONE HYDROCHLORIDE 0.5 MG: 1 INJECTION, SOLUTION INTRAMUSCULAR; INTRAVENOUS; SUBCUTANEOUS at 12:07

## 2022-07-26 RX ADMIN — MIDODRINE HYDROCHLORIDE 15 MG: 5 TABLET ORAL at 05:07

## 2022-07-26 RX ADMIN — MIDODRINE HYDROCHLORIDE 15 MG: 5 TABLET ORAL at 02:07

## 2022-07-26 RX ADMIN — CEFEPIME 2 G: 2 INJECTION, POWDER, FOR SOLUTION INTRAVENOUS at 08:07

## 2022-07-26 RX ADMIN — HYDROMORPHONE HYDROCHLORIDE 1 MG: 1 INJECTION, SOLUTION INTRAMUSCULAR; INTRAVENOUS; SUBCUTANEOUS at 03:07

## 2022-07-26 RX ADMIN — DEXMEDETOMIDINE HYDROCHLORIDE 0.8 MCG/KG/HR: 4 INJECTION INTRAVENOUS at 10:07

## 2022-07-26 RX ADMIN — ACETYLCYSTEINE 4 ML: 100 INHALANT RESPIRATORY (INHALATION) at 12:07

## 2022-07-26 RX ADMIN — DEXMEDETOMIDINE HYDROCHLORIDE 0.6 MCG/KG/HR: 4 INJECTION INTRAVENOUS at 12:07

## 2022-07-26 RX ADMIN — POTASSIUM BICARBONATE 25 MEQ: 978 TABLET, EFFERVESCENT ORAL at 08:07

## 2022-07-26 RX ADMIN — MIDODRINE HYDROCHLORIDE 15 MG: 5 TABLET ORAL at 10:07

## 2022-07-26 RX ADMIN — CEFEPIME 2 G: 2 INJECTION, POWDER, FOR SOLUTION INTRAVENOUS at 09:07

## 2022-07-26 RX ADMIN — POTASSIUM CHLORIDE 40 MEQ: 29.8 INJECTION, SOLUTION INTRAVENOUS at 05:07

## 2022-07-26 RX ADMIN — GUAIFENESIN 300 MG: 100 SOLUTION ORAL at 11:07

## 2022-07-26 RX ADMIN — PANTOPRAZOLE SODIUM 40 MG: 40 INJECTION, POWDER, FOR SOLUTION INTRAVENOUS at 10:07

## 2022-07-26 RX ADMIN — POLYETHYLENE GLYCOL 3350 17 G: 17 POWDER, FOR SOLUTION ORAL at 10:07

## 2022-07-26 RX ADMIN — AMIODARONE HYDROCHLORIDE 400 MG: 200 TABLET ORAL at 08:07

## 2022-07-26 RX ADMIN — AMIODARONE HYDROCHLORIDE 400 MG: 200 TABLET ORAL at 10:07

## 2022-07-26 RX ADMIN — ASPIRIN 81 MG CHEWABLE TABLET 81 MG: 81 TABLET CHEWABLE at 10:07

## 2022-07-26 RX ADMIN — DEXMEDETOMIDINE HYDROCHLORIDE 0.4 MCG/KG/HR: 4 INJECTION INTRAVENOUS at 03:07

## 2022-07-26 RX ADMIN — Medication 6 MG: at 11:07

## 2022-07-26 RX ADMIN — ACETYLCYSTEINE 4 ML: 100 SOLUTION ORAL; RESPIRATORY (INHALATION) at 07:07

## 2022-07-26 RX ADMIN — LEVOTHYROXINE SODIUM 112 MCG: 112 TABLET ORAL at 05:07

## 2022-07-26 RX ADMIN — WARFARIN SODIUM 4 MG: 4 TABLET ORAL at 05:07

## 2022-07-26 RX ADMIN — AMIODARONE HYDROCHLORIDE 0.5 MG/MIN: 1.8 INJECTION, SOLUTION INTRAVENOUS at 12:07

## 2022-07-26 RX ADMIN — POTASSIUM BICARBONATE 25 MEQ: 978 TABLET, EFFERVESCENT ORAL at 10:07

## 2022-07-26 RX ADMIN — VASOPRESSIN 0.04 UNITS/MIN: 20 INJECTION INTRAVENOUS at 07:07

## 2022-07-26 RX ADMIN — PANTOPRAZOLE SODIUM 40 MG: 40 INJECTION, POWDER, FOR SOLUTION INTRAVENOUS at 08:07

## 2022-07-26 RX ADMIN — HEPARIN SODIUM 1800 UNITS/HR: 5000 INJECTION INTRAVENOUS; SUBCUTANEOUS at 06:07

## 2022-07-26 RX ADMIN — HEPARIN SODIUM 2000 UNITS/HR: 5000 INJECTION INTRAVENOUS; SUBCUTANEOUS at 10:07

## 2022-07-26 RX ADMIN — ACETAMINOPHEN 650 MG: 325 TABLET ORAL at 06:07

## 2022-07-26 RX ADMIN — AMIODARONE HYDROCHLORIDE 0.5 MG/MIN: 1.8 INJECTION, SOLUTION INTRAVENOUS at 11:07

## 2022-07-26 RX ADMIN — ALBUTEROL SULFATE 2.5 MG: 2.5 SOLUTION RESPIRATORY (INHALATION) at 07:07

## 2022-07-26 RX ADMIN — ALBUTEROL SULFATE 2.5 MG: 2.5 SOLUTION RESPIRATORY (INHALATION) at 12:07

## 2022-07-26 RX ADMIN — MAGNESIUM SULFATE HEPTAHYDRATE 2 G: 40 INJECTION, SOLUTION INTRAVENOUS at 10:07

## 2022-07-26 RX ADMIN — HEPARIN SODIUM 2200 UNITS/HR: 5000 INJECTION INTRAVENOUS; SUBCUTANEOUS at 07:07

## 2022-07-26 RX ADMIN — POTASSIUM CHLORIDE 40 MEQ: 29.8 INJECTION, SOLUTION INTRAVENOUS at 09:07

## 2022-07-26 RX ADMIN — GUAIFENESIN 300 MG: 100 SOLUTION ORAL at 12:07

## 2022-07-26 RX ADMIN — LIDOCAINE HYDROCHLORIDE 0.5 MG/MIN: 8 INJECTION, SOLUTION INTRAVENOUS at 03:07

## 2022-07-26 RX ADMIN — DEXMEDETOMIDINE HYDROCHLORIDE 0.8 MCG/KG/HR: 4 INJECTION INTRAVENOUS at 08:07

## 2022-07-26 RX ADMIN — FUROSEMIDE 10 MG: 10 INJECTION, SOLUTION INTRAMUSCULAR; INTRAVENOUS at 10:07

## 2022-07-26 NOTE — ASSESSMENT & PLAN NOTE
Leukocytosis with associated fever post-decannulation. Blcx so far ngtd, resp cx unrevealing. Pt is at risk for fungal infection (prior lines, multiple surgeries). S/P HM3 with removal of old driveline. Up-escalated to meropenem due to acute decompensation.     Leukocytosis stable. Sputum cultures with K aerogenes.    · De-escalate meropenem to cefepime.  · Discontinue vancomycin for now. Monitor vancomycin trough.   · Will follow up DLES cultures.

## 2022-07-26 NOTE — PHYSICIAN QUERY
"PT Name: Tim Richards  MR #: [id number]    DOCUMENTATION CLARIFICATION     CDS/: Waleska Vasquez               Contact information:[email or web address]  This form is a permanent document in the medical record.    Query Date: July 26, 2022      By submitting this query, we are merely seeking further clarification of documentation.  Please utilize your independent clinical judgment when addressing the question(s) below.    The Medical Record reflects the following:    Clinical Information Location in Medical Record   FINDINGS:  endotracheal tube is at the level of clavicles.  Right internal jugular catheter, left internal jugular catheter, sternotomy wires, lower chest surgical drains, LVAD remain unchanged. Enteric tube tip is not included in the field of view, obscured. There is mildly increased left perihilar opacity which may reflect  edema or developing infection       Antibiotics: meropenem   WBC 26->25K  7/20 +VAP Cx Klebsiella aerogenes      Leukocytosis  Leukocytosis with associated fever post-decannulation. Blcx so far ngtd, resp cx unrevealing. Pt is at risk for fungal infection (prior lines, multiple surgeries). S/P HM3 with removal of old driveline. Up-escalated to meropenem due to acute decompensation.      Leukocytosis stable. Sputum cultures with K aerogenes.    ·De-escalate meropenem to cefepime.  ·Discontinue vancomycin for now. Monitor vancomycin trough.   ·Will follow up DLES cultures.     ID reconsulted 7/21 for "sepsis and consideration for fungemia   Chest xray 7-18            Nephrology note 7-25            Infectious disease note 7-25       Please clarify/confirm the Consultants diagnosis of   VAP(Ventilator associated pneumonia).     [  ] Diagnosis ruled in   [  ] Diagnosis ruled out   [  ] Other diagnosis (please specify): _____________________________   [  ] Clinically undetermined             "

## 2022-07-26 NOTE — PLAN OF CARE
"      SICU PLAN OF CARE NOTE    Dx: Left ventricular assist device (LVAD) complication    Shift Events: 500ml Albumin and 1unit pRBC. Decreased rate to .5 and changed levo to giapreza per Shae.    Goals of Care: MAP >70, K >4.5, Mag >2.5    Neuro: Arouses to Voice, Follows Commands, and Moves All Extremities    Vital Signs: BP (!) 80/0 (BP Location: Right arm, Patient Position: Lying)   Pulse 83   Temp (!) 100.76 °F (38.2 °C) (Core Bladder)   Resp (!) 26   Ht 6' 1" (1.854 m)   Wt 100.7 kg (222 lb)   SpO2 97%   BMI 29.29 kg/m²     Cardiac: A Fib with occasional runs of MD Jose Aware    Respiratory: Ventilator 50FiO2/5 PEEP    Diet: NPO    Gtts: Precedex @0.6, Vasopressin @.04, Epinephrine @.02, Amiodarone @.5, Lidocaine @.5, Lasix @1, Heparin @1800, and Angiotensin @5    Urine Output: Urinary Catheter 1180 cc/shift    Wound Vac 100 cc/ Shift      Labs/Accuchecks: Q4 labs and accucheck    Skin: Bed plugged in. Heel and sacral foams in place. Incontinence pads in use. Heel boots also in place. Site of former drive line packed with iodofoam, draining serosanguineous. No new skin integrity compromise noted upon assessment.        "

## 2022-07-26 NOTE — PROGRESS NOTES
Interdisciplinary Rounds Report:   Attended interdisciplinary rounds with the \[facility]\""/CTS services including the LVAD Coordinators, social workers, cardiologists, surgeons,  PT/OT/Speech, dietician, and unit charge nurses. Discussed patient status, plan of care, goals of care, including DC date, and post discharge needs. Plan of care will be discussed with the patient and/or family per the physician while rounding on the floor. This is a recurring meeting that is medically and socially necessary to collaborate with the interdisciplinary team to assist patient needs and safe discharge.

## 2022-07-26 NOTE — SUBJECTIVE & OBJECTIVE
Interval History: Lidocaine weaned down from 1mg-> to 0.5. Pressors being weaned down. Weaning pressors. Still on vaso 0.04 and epi 1. Able to communicate non-verbally.      Review of Systems   Unable to perform ROS: Intubated   Objective:     Vital Signs (Most Recent):  Temp: (!) 100.58 °F (38.1 °C) (07/26/22 1015)  Pulse: 79 (07/26/22 1015)  Resp: (!) 26 (07/26/22 0706)  BP: (!) 80/0 (07/26/22 0300)  SpO2:  (UNABLE TO ) (07/26/22 0706)   Vital Signs (24h Range):  Temp:  [98.96 °F (37.2 °C)-100.76 °F (38.2 °C)] 100.58 °F (38.1 °C)  Pulse:  [] 79  Resp:  [19-34] 26  SpO2:  [93 %-99 %] 97 %  BP: (72-90)/(0-79) 80/0  Arterial Line BP: ()/(61-84) 80/68     Weight: 100.7 kg (222 lb)  Body mass index is 29.29 kg/m².     SpO2:  (UNABLE TO )  O2 Device (Oxygen Therapy): ventilator    Physical Exam  Constitutional:       General: He is not in acute distress.     Appearance: He is not ill-appearing.   HENT:      Head: Normocephalic and atraumatic.      Nose: No congestion.      Mouth/Throat:      Mouth: Mucous membranes are moist.   Eyes:      Extraocular Movements: Extraocular movements intact.      Conjunctiva/sclera: Conjunctivae normal.   Cardiovascular:      Rate and Rhythm: Normal rate and regular rhythm.      Pulses: Normal pulses.      Comments: Frequent ectopy   Pulmonary:      Effort: Pulmonary effort is normal. No respiratory distress.      Comments: Intubated  Abdominal:      General: Abdomen is flat.   Musculoskeletal:      Cervical back: Normal range of motion.      Right lower leg: No edema.      Left lower leg: No edema.   Skin:     General: Skin is warm.      Capillary Refill: Capillary refill takes less than 2 seconds.      Findings: No rash.   Neurological:      Mental Status: He is alert.      Comments: Sedated but follows commands and easily arousable         Significant Labs: BMP:   Recent Labs   Lab 07/25/22  1236 07/25/22  1238 07/25/22  1939 07/26/22  0321   GLU  --  108   108 120* 127*   NA  --  139  139 137 138   K  --  4.2  4.2 3.8 4.0   CL  --  107  107 105 107   CO2  --  23  23 25 22*   BUN  --  34*  34* 33* 33*   CREATININE  --  1.6*  1.6* 1.7* 1.4   CALCIUM  --  9.2  9.2 9.1 8.9   MG 2.6  --  2.3 2.6   , CMP:   Recent Labs   Lab 07/25/22  1238 07/25/22 1939 07/26/22  0321     139 137 138   K 4.2  4.2 3.8 4.0     107 105 107   CO2 23  23 25 22*     108 120* 127*   BUN 34*  34* 33* 33*   CREATININE 1.6*  1.6* 1.7* 1.4   CALCIUM 9.2  9.2 9.1 8.9   PROT 6.4  6.4 6.3 6.4   ALBUMIN 2.0*  2.0* 2.0* 2.2*   BILITOT 11.1*  11.1* 10.4* 10.5*   ALKPHOS 116  116 116 125   AST 74*  74* 66* 74*   ALT 38  38 38 40   ANIONGAP 9  9 7* 9   ESTGFRAFRICA 55.2*  55.2* 51.3* >60.0   EGFRNONAA 47.8*  47.8* 44.4* 56.2*   , and CBC:   Recent Labs   Lab 07/25/22  1236 07/25/22  1323 07/25/22 1939 07/25/22 1943 07/26/22  0321 07/26/22  0732   WBC 23.04*  --  21.39*  --  19.73*  --    HGB 7.0*  --  7.0*  --  6.7*  --    HCT 22.6*   < > 22.6*   < > 21.7* 21*     --  372  --  384  --     < > = values in this interval not displayed.

## 2022-07-26 NOTE — NURSING
Pt extubated, awake and alert. No family at bedside. Jenn, RN at bedside for dressing change, see picture below. Dr. Ragsdale presented to bedside druing dressing change. Hydrofera blue placed on driveline along with extra gauze in order to help with drainage. DLES is not healing well due to moisture from drainage/dressing. Pt has a new enlarged area above chest tube sites and beneath misternal skin vac. Dr. Ragsdale is aware and will discuss with Dr. Kaufman for imaging.

## 2022-07-26 NOTE — SUBJECTIVE & OBJECTIVE
Interval History: ". Patient remains intubated and febrile. BAL culture on 7/20 now with K aerogenes. Blood cultures remain NGTD. Yellow mucoid drainage noted at DLES. Brown yellow secretions at chest tube exit site.     Review of Systems   Unable to perform ROS: Intubated   Objective:     Vital Signs (Most Recent):  Temp: 99.5 °F (37.5 °C) (07/25/22 1937)  Pulse: 69 (07/25/22 1937)  Resp: (!) 34 (07/25/22 1936)  BP: (!) 82/0 (07/25/22 1901)  SpO2: (!) 93 % (07/25/22 1901)   Vital Signs (24h Range):  Temp:  [98.06 °F (36.7 °C)-99.5 °F (37.5 °C)] 99.5 °F (37.5 °C)  Pulse:  [] 69  Resp:  [19-34] 34  SpO2:  [93 %-100 %] 93 %  BP: (72-98)/(0-79) 82/0  Arterial Line BP: ()/() 75/67     Weight: 100.7 kg (222 lb)  Body mass index is 29.29 kg/m².    Estimated Creatinine Clearance: 65.1 mL/min (A) (based on SCr of 1.6 mg/dL (H)).    Physical Exam  Vitals and nursing note reviewed. Exam conducted with a chaperone present.   Constitutional:       General: He is sleeping.      Appearance: He is ill-appearing.      Interventions: He is sedated and intubated.   HENT:      Head: Normocephalic and atraumatic.   Eyes:      General: Scleral icterus present.         Right eye: No discharge.         Left eye: No discharge.      Conjunctiva/sclera: Conjunctivae normal.   Cardiovascular:      Pulses: Normal pulses.      Comments: Distant VAD Humming sounds  Pulmonary:      Effort: Pulmonary effort is normal. No respiratory distress. He is intubated.      Breath sounds: No stridor. Rales present. No wheezing or rhonchi.   Abdominal:      General: Bowel sounds are decreased. There is no distension.      Palpations: Abdomen is soft.      Tenderness: There is no abdominal tenderness.          Comments: Left sided DLES covered with gauze dressing soiled with yellow sanguinous secretions.    Musculoskeletal:         General: Normal range of motion.   Skin:     General: Skin is warm and dry.   Neurological:      General: No  focal deficit present.      Mental Status: He is easily aroused.      Comments: Awakens with verbal stimuli. Shakes head for yes/no questions.                Significant Labs: Blood Culture:   Recent Labs   Lab 07/20/22  1445 07/20/22  1450 07/22/22  0857 07/22/22  0918   LABBLOO No growth after 5 days. No growth after 5 days. No Growth to date  No Growth to date  No Growth to date  No Growth to date No Growth to date  No Growth to date  No Growth to date  No Growth to date       BMP:   Recent Labs   Lab 07/25/22  1236 07/25/22  1238   GLU  --  108  108   NA  --  139  139   K  --  4.2  4.2   CL  --  107  107   CO2  --  23  23   BUN  --  34*  34*   CREATININE  --  1.6*  1.6*   CALCIUM  --  9.2  9.2   MG 2.6  --        CBC:   Recent Labs   Lab 07/24/22 2010 07/24/22  2153 07/25/22  0401 07/25/22  0818 07/25/22  1236 07/25/22  1323 07/25/22  1943   WBC 26.84*  --  24.84*  --  23.04*  --   --    HGB 7.3*  --  7.2*  --  7.0*  --   --    HCT 23.5*   < > 23.5*   < > 22.6* 22* 22*     --  362  --  379  --   --     < > = values in this interval not displayed.       Respiratory Culture:   Recent Labs   Lab 07/20/22  1120   GSRESP Few WBC's  Rare Gram positive cocci  Rare Gram negative rods       Urine Culture: No results for input(s): LABURIN in the last 4320 hours.  Urine Studies:   Recent Labs   Lab 06/28/22  1137   COLORU Yellow   APPEARANCEUA Clear   PHUR 5.0   SPECGRAV 1.010   PROTEINUA Negative   GLUCUA Negative   KETONESU Negative   BILIRUBINUA Negative   OCCULTUA 2+*   NITRITE Negative   LEUKOCYTESUR Negative   RBCUA 0   WBCUA 2   SQUAMEPITHEL 0   HYALINECASTS 4*       Wound Culture: No results for input(s): LABAERO in the last 4320 hours.    Significant Imaging: I have reviewed all pertinent imaging results/findings within the past 24 hours.

## 2022-07-26 NOTE — PROGRESS NOTES
[facility] - [facility]ensive [facility]  Critical Care - Surgery  Progress Note    Patient Name: Tim Richards  MRN: [id number]  Admission Date: 6/27/2022  Hospital Length of Stay: 29 days  Code Status: Full Code  Attending Provider: Yg Kaufman MD  Primary Care Provider: Deyanira Booth MD   Principal Problem: Left ventricular assist device (LVAD) complication    Subjective:     Hospital/ICU Course:  No notes on file    Interval History/Significant Events: Some increase in pressor requirements overnight. Patient switched from levo to giapreza per staff request. Lidocaine drip decreased to 0.5 with increase in ectopy. Tolerated 4 hours of PAV before needing to be switched to a rate.     Follow-up For: Procedure(s) (LRB):  REMOVAL, FOREIGN BODY (N/A)    Post-Operative Day: 4 Days Post-Op    Objective:     Vital Signs (Most Recent):  Temp: (!) 100.76 °F (38.2 °C) (07/26/22 0532)  Pulse: 85 (07/26/22 0532)  Resp: (!) 26 (07/26/22 0341)  BP: (!) 80/0 (07/26/22 0300)  SpO2: 97 % (07/26/22 0341)   Vital Signs (24h Range):  Temp:  [98.42 °F (36.9 °C)-100.76 °F (38.2 °C)] 100.76 °F (38.2 °C)  Pulse:  [] 85  Resp:  [19-34] 26  SpO2:  [93 %-99 %] 97 %  BP: (72-98)/(0-79) 80/0  Arterial Line BP: ()/(61-80) 92/61     Weight: 100.7 kg (222 lb)  Body mass index is 29.29 kg/m².      Intake/Output Summary (Last 24 hours) at 7/26/2022 0557  Last data filed at 7/26/2022 0500  Gross per 24 hour   Intake 3066.8 ml   Output 2540 ml   Net 526.8 ml       Physical Exam  Constitutional:       Comments: Intubated and sedated   HENT:      Head: Normocephalic and atraumatic.      Mouth/Throat:      Comments: OG in place  Eyes:      Pupils: Pupils are equal, round, and reactive to light.   Neck:      Comments: R IJ CVC    Cardiovascular:      Rate and Rhythm: Normal rate. Rhythm irregular.      Comments: LVAD in place -- 6400 rpm, 5.0L    Midline sternotomy c/d with dressing in place      Pulmonary:      Comments: Mechanically  ventilated  Abdominal:      General: There is no distension.      Palpations: Abdomen is soft.      Comments: Prior driveline site packed with iodoform gauze   Genitourinary:     Comments: Lagunas in place draining clear urine  Musculoskeletal:      Comments: ECMO cannula sites with dressing in place.     Cutdown incision with seroma with wound vac in place. Minimal output    right radial arterial line   Skin:     General: Skin is warm and dry.   Neurological:      Comments: Following commands when sedation paused       Vents:  Vent Mode: A/C (07/26/22 0532)  Ventilator Initiated: Yes (07/15/22 1027)  Set Rate: 16 BPM (07/26/22 0532)  Vt Set: 450 mL (07/26/22 0532)  Pressure Support: 5 cmH20 (07/25/22 0822)  PEEP/CPAP: 5 cmH20 (07/26/22 0532)  Oxygen Concentration (%): 50 (07/26/22 0532)  Peak Airway Pressure: 26 cmH2O (07/26/22 0532)  Plateau Pressure: 21 cmH20 (07/26/22 0532)  Total Ve: 14.4 mL (07/26/22 0532)  Negative Inspiratory Force (cm H2O): -27 (07/25/22 1937)  F/VT Ratio<105 (RSBI): (!) 57.27 (07/26/22 0341)    Lines/Drains/Airways       Central Venous Catheter Line  Duration             Percutaneous Central Line Insertion/Assessment - Quad Lumen  07/21/22 1515 right internal jugular 4 days              Drain  Duration                  Urethral Catheter 07/13/22 0848 Temperature probe;Latex 14 Fr. 12 days         NG/OG Tube 07/15/22 1030 Left nostril 10 days              Airway  Duration                  Airway - Non-Surgical 07/13/22 0848 12 days              Arterial Line  Duration             Arterial Line 07/13/22 2000 Right Radial 12 days              Line  Duration                  VAD 07/13/22 1300 Left ventricular assist device HeartMate 3 12 days              Peripheral Intravenous Line  Duration                  Midline Catheter Insertion/Assessment  - Single Lumen 07/21/22 1616 Left basilic vein (medial side of arm) 18g x 10cm 4 days                    Significant Labs:    CBC/Anemia  Profile:  Recent Labs   Lab 07/25/22  1236 07/25/22  1323 07/25/22 1939 07/25/22  1943 07/26/22 0128 07/26/22  0321   WBC 23.04*  --  21.39*  --   --  19.73*   HGB 7.0*  --  7.0*  --   --  6.7*   HCT 22.6*   < > 22.6* 22* 25* 21.7*     --  372  --   --  384   MCV 93  --  96  --   --  95   RDW 19.3*  --  20.3*  --   --  20.8*    < > = values in this interval not displayed.        Chemistries:  Recent Labs   Lab 07/25/22  1236 07/25/22  1238 07/25/22 1939 07/26/22 0321   NA  --  139  139 137 138   K  --  4.2  4.2 3.8 4.0   CL  --  107  107 105 107   CO2  --  23  23 25 22*   BUN  --  34*  34* 33* 33*   CREATININE  --  1.6*  1.6* 1.7* 1.4   CALCIUM  --  9.2  9.2 9.1 8.9   ALBUMIN  --  2.0*  2.0* 2.0* 2.2*   PROT  --  6.4  6.4 6.3 6.4   BILITOT  --  11.1*  11.1* 10.4* 10.5*   ALKPHOS  --  116  116 116 125   ALT  --  38  38 38 40   AST  --  74*  74* 66* 74*   MG 2.6  --  2.3 2.6   PHOS 2.8  --  3.0 2.6*       ABGs:   Recent Labs   Lab 07/26/22 0128   PH 7.412   PCO2 33.7*   HCO3 21.4*   POCSATURATED 98   BE -3     Coagulation:   Recent Labs   Lab 07/26/22 0321   INR 1.1   APTT 28.3     Lactic Acid: No results for input(s): LACTATE in the last 48 hours.  All pertinent labs within the past 24 hours have been reviewed.    Significant Imaging:  I have reviewed all pertinent imaging results/findings within the past 24 hours.    Assessment/Plan:     Chronic combined systolic and diastolic heart failure  Tim Richards is a 56 y.o. male HFrEF with an EF of 10% and a history of HM2 LVAD in 2018 who is now s/p HM3 upgrade, initiation of V-A ECMO after failure to wean off bypass x2      Neuro/Psych:   -- Sedation: Precedex.  -- Pain: PRN Dilaudid             Cards:   -- S/P LVAD exchange on 7/14/22  --Improving pressor requirements, wean gwen for MAP > 75, keep other pressors the same   Epi 0.02   Levo off   Vaso 0.04   Giapreza 5  -- Continue Midodrine 15 mg Q8  -- Stress dose steroids complete  --  MAP goal 75  -- VAD  flows stable  -- Decannulated on 7/19  -- Sternal closure on 7/15  -- Bedside drive line removal on 7/23  -- EP consult due to v-tach  -- Continue lidocaine, continue amio ggt, will consider restarting mexiletine      Pulm:   -- Goal O2 sat > 90%  -- ABG PRN  -- Chest tubes removed  -- Nitric weaned off  -- PAV as tolerated with goal to potentially extubate today vs tomorrow.  -- Trach consult; tentatively planning for Thursday      Renal:  -- Keep sun for strict I/O  -- Trend BUN/Cr  -- Lasix ggt 1/hr      FEN / GI:   -- Replace lytes as needed  -- Nutrition: NPO, trickle TF   -- GI ppx: PPI  -- Bowel reg: miralax, docusate, go lytely      ID:   -- Tm: febrile; WBC 24.8 from 27  -- ABX cefepime  -- Cultures sent, repeat cultures sent 7/22, no growth to date  -- BAL GNR --> pansensitive klebsiella   -- ID consult   -- daily vanc levels  -- Line exchange 7/21      Heme/Onc:   -- H/H stable   -- Daily CBC  -- Received 18 pRBCs, 16 FFP, 2 plt, 25 cryo; 1500 albumin intra-op  -- Heparin gtt at 1800, do not titrate   -- Warfarin 2mg      Endo:   -- BG goal 140-180  -- SSI  -- Levothyroxine      PPx:   Feeding: NPO, TF  Analgesia/Sedation: Precedex / PRN Dilaudid  Thromboembolic prevention: SCDs, heparin gtt  HOB >30: Yes  Stress Ulcer ppx: PPI  Glucose control: Critical care goal 140-180 g/dl, ISS     Lines/Drains/Airway: ETT; OG; RIJ CVC, r-radial a-line, sun; WV, VAD; midline      Dispo/Code Status/Palliative:   -- SICU / Full Code.              Dang Amezcua MD  Critical Care - Surgery  [facility]

## 2022-07-26 NOTE — SUBJECTIVE & OBJECTIVE
Interval History/Significant Events: Some increase in pressor requirements overnight. Patient switched from levo to giapreza per staff request. Lidocaine drip decreased to 0.5 with increase in ectopy. Tolerated 4 hours of PAV before needing to be switched to a rate.     Follow-up For: Procedure(s) (LRB):  REMOVAL, FOREIGN BODY (N/A)    Post-Operative Day: 4 Days Post-Op    Objective:     Vital Signs (Most Recent):  Temp: (!) 100.76 °F (38.2 °C) (07/26/22 0532)  Pulse: 85 (07/26/22 0532)  Resp: (!) 26 (07/26/22 0341)  BP: (!) 80/0 (07/26/22 0300)  SpO2: 97 % (07/26/22 0341)   Vital Signs (24h Range):  Temp:  [98.42 °F (36.9 °C)-100.76 °F (38.2 °C)] 100.76 °F (38.2 °C)  Pulse:  [] 85  Resp:  [19-34] 26  SpO2:  [93 %-99 %] 97 %  BP: (72-98)/(0-79) 80/0  Arterial Line BP: ()/(61-80) 92/61     Weight: 100.7 kg (222 lb)  Body mass index is 29.29 kg/m².      Intake/Output Summary (Last 24 hours) at 7/26/2022 0557  Last data filed at 7/26/2022 0500  Gross per 24 hour   Intake 3066.8 ml   Output 2540 ml   Net 526.8 ml       Physical Exam  Constitutional:       Comments: Intubated and sedated   HENT:      Head: Normocephalic and atraumatic.      Mouth/Throat:      Comments: OG in place  Eyes:      Pupils: Pupils are equal, round, and reactive to light.   Neck:      Comments: R IJ CVC    Cardiovascular:      Rate and Rhythm: Normal rate. Rhythm irregular.      Comments: LVAD in place -- 6400 rpm, 5.0L    Midline sternotomy c/d with dressing in place      Pulmonary:      Comments: Mechanically ventilated  Abdominal:      General: There is no distension.      Palpations: Abdomen is soft.      Comments: Prior driveline site packed with iodoform gauze   Genitourinary:     Comments: Lagunas in place draining clear urine  Musculoskeletal:      Comments: ECMO cannula sites with dressing in place.     Cutdown incision with seroma with wound vac in place. Minimal output    right radial arterial line   Skin:     General: Skin  is warm and dry.   Neurological:      Comments: Following commands when sedation paused       Vents:  Vent Mode: A/C (07/26/22 0532)  Ventilator Initiated: Yes (07/15/22 1027)  Set Rate: 16 BPM (07/26/22 0532)  Vt Set: 450 mL (07/26/22 0532)  Pressure Support: 5 cmH20 (07/25/22 0822)  PEEP/CPAP: 5 cmH20 (07/26/22 0532)  Oxygen Concentration (%): 50 (07/26/22 0532)  Peak Airway Pressure: 26 cmH2O (07/26/22 0532)  Plateau Pressure: 21 cmH20 (07/26/22 0532)  Total Ve: 14.4 mL (07/26/22 0532)  Negative Inspiratory Force (cm H2O): -27 (07/25/22 1937)  F/VT Ratio<105 (RSBI): (!) 57.27 (07/26/22 0341)    Lines/Drains/Airways       Central Venous Catheter Line  Duration             Percutaneous Central Line Insertion/Assessment - Quad Lumen  07/21/22 1515 right internal jugular 4 days              Drain  Duration                  Urethral Catheter 07/13/22 0848 Temperature probe;Latex 14 Fr. 12 days         NG/OG Tube 07/15/22 1030 Left nostril 10 days              Airway  Duration                  Airway - Non-Surgical 07/13/22 0848 12 days              Arterial Line  Duration             Arterial Line 07/13/22 2000 Right Radial 12 days              Line  Duration                  VAD 07/13/22 1300 Left ventricular assist device HeartMate 3 12 days              Peripheral Intravenous Line  Duration                  Midline Catheter Insertion/Assessment  - Single Lumen 07/21/22 1616 Left basilic vein (medial side of arm) 18g x 10cm 4 days                    Significant Labs:    CBC/Anemia Profile:  Recent Labs   Lab 07/25/22  1236 07/25/22  1323 07/25/22  1939 07/25/22  1943 07/26/22  0128 07/26/22  0321   WBC 23.04*  --  21.39*  --   --  19.73*   HGB 7.0*  --  7.0*  --   --  6.7*   HCT 22.6*   < > 22.6* 22* 25* 21.7*     --  372  --   --  384   MCV 93  --  96  --   --  95   RDW 19.3*  --  20.3*  --   --  20.8*    < > = values in this interval not displayed.        Chemistries:  Recent Labs   Lab 07/25/22  0475  07/25/22  1238 07/25/22  1939 07/26/22  0321   NA  --  139  139 137 138   K  --  4.2  4.2 3.8 4.0   CL  --  107  107 105 107   CO2  --  23 23 25 22*   BUN  --  34*  34* 33* 33*   CREATININE  --  1.6*  1.6* 1.7* 1.4   CALCIUM  --  9.2  9.2 9.1 8.9   ALBUMIN  --  2.0*  2.0* 2.0* 2.2*   PROT  --  6.4  6.4 6.3 6.4   BILITOT  --  11.1*  11.1* 10.4* 10.5*   ALKPHOS  --  116  116 116 125   ALT  --  38  38 38 40   AST  --  74*  74* 66* 74*   MG 2.6  --  2.3 2.6   PHOS 2.8  --  3.0 2.6*       ABGs:   Recent Labs   Lab 07/26/22  0128   PH 7.412   PCO2 33.7*   HCO3 21.4*   POCSATURATED 98   BE -3     Coagulation:   Recent Labs   Lab 07/26/22  0321   INR 1.1   APTT 28.3     Lactic Acid: No results for input(s): LACTATE in the last 48 hours.  All pertinent labs within the past 24 hours have been reviewed.    Significant Imaging:  I have reviewed all pertinent imaging results/findings within the past 24 hours.

## 2022-07-26 NOTE — ASSESSMENT & PLAN NOTE
Tim Richards is a 56 y.o. male HFrEF with an EF of 10% and a history of HM2 LVAD in 2018 who is now s/p HM3 upgrade, initiation of V-A ECMO after failure to wean off bypass x2      Neuro/Psych:   -- Sedation: Precedex.  -- Pain: PRN Dilaudid             Cards:   -- S/P LVAD exchange on 7/14/22  --Improving pressor requirements, wean gwen for MAP > 75, keep other pressors the same   Epi 0.02   Levo off   Vaso 0.04   Giapreza 5  -- Continue Midodrine 15 mg Q8  -- Stress dose steroids complete  -- MAP goal 75  -- VAD  flows stable  -- Decannulated on 7/19  -- Sternal closure on 7/15  -- Bedside drive line removal on 7/23  -- EP consult due to v-tach  -- Continue lidocaine, continue amio ggt, will consider restarting mexiletine      Pulm:   -- Goal O2 sat > 90%  -- ABG PRN  -- Chest tubes removed  -- Nitric weaned off  -- PAV as tolerated with goal to potentially extubate today vs tomorrow.  -- Trach consult; tentatively planning for Thursday      Renal:  -- Keep sun for strict I/O  -- Trend BUN/Cr  -- Lasix ggt 1/hr      FEN / GI:   -- Replace lytes as needed  -- Nutrition: NPO, trickle TF   -- GI ppx: PPI  -- Bowel reg: miralax, docusate, go lytely      ID:   -- Tm: febrile; WBC 24.8 from 27  -- ABX cefepime  -- Cultures sent, repeat cultures sent 7/22, no growth to date  -- BAL GNR --> pansensitive klebsiella   -- ID consult   -- daily vanc levels  -- Line exchange 7/21      Heme/Onc:   -- H/H stable   -- Daily CBC  -- Received 18 pRBCs, 16 FFP, 2 plt, 25 cryo; 1500 albumin intra-op  -- Heparin gtt at 1800, do not titrate   -- Warfarin 2mg      Endo:   -- BG goal 140-180  -- SSI  -- Levothyroxine      PPx:   Feeding: NPO, TF  Analgesia/Sedation: Precedex / PRN Dilaudid  Thromboembolic prevention: SCDs, heparin gtt  HOB >30: Yes  Stress Ulcer ppx: PPI  Glucose control: Critical care goal 140-180 g/dl, ISS     Lines/Drains/Airway: ETT; OG; RIJ CVC, r-radial a-line, sun; WV, VAD; midline       Dispo/Code Status/Palliative:   -- SICU / Full Code.

## 2022-07-26 NOTE — PROGRESS NOTES
[facility] - [facility]  Cardiac Electrophysiology  Progress Note    Admission Date: 6/27/2022  Code Status: Full Code   Attending Physician: Yg Kaufman MD   Expected Discharge Date: 8/15/2022  Principal Problem:Left ventricular assist device (LVAD) complication    Subjective:     Interval History: Lidocaine weaned down from 1mg-> to 0.5. Pressors being weaned down. Weaning pressors. Still on vaso 0.04 and epi 1. Able to communicate non-verbally.      Review of Systems   Unable to perform ROS: Intubated   Objective:     Vital Signs (Most Recent):  Temp: (!) 100.58 °F (38.1 °C) (07/26/22 1015)  Pulse: 79 (07/26/22 1015)  Resp: (!) 26 (07/26/22 0706)  BP: (!) 80/0 (07/26/22 0300)  SpO2:  (UNABLE TO ) (07/26/22 0706)   Vital Signs (24h Range):  Temp:  [98.96 °F (37.2 °C)-100.76 °F (38.2 °C)] 100.58 °F (38.1 °C)  Pulse:  [] 79  Resp:  [19-34] 26  SpO2:  [93 %-99 %] 97 %  BP: (72-90)/(0-79) 80/0  Arterial Line BP: ()/(61-84) 80/68     Weight: 100.7 kg (222 lb)  Body mass index is 29.29 kg/m².     SpO2:  (UNABLE TO )  O2 Device (Oxygen Therapy): ventilator    Physical Exam  Constitutional:       General: He is not in acute distress.     Appearance: He is not ill-appearing.   HENT:      Head: Normocephalic and atraumatic.      Nose: No congestion.      Mouth/Throat:      Mouth: Mucous membranes are moist.   Eyes:      Extraocular Movements: Extraocular movements intact.      Conjunctiva/sclera: Conjunctivae normal.   Cardiovascular:      Rate and Rhythm: Normal rate and regular rhythm.      Pulses: Normal pulses.      Comments: Frequent ectopy   Pulmonary:      Effort: Pulmonary effort is normal. No respiratory distress.      Comments: Intubated  Abdominal:      General: Abdomen is flat.   Musculoskeletal:      Cervical back: Normal range of motion.      Right lower leg: No edema.      Left lower leg: No edema.   Skin:     General: Skin is warm.      Capillary Refill: Capillary  refill takes less than 2 seconds.      Findings: No rash.   Neurological:      Mental Status: He is alert.      Comments: Sedated but follows commands and easily arousable         Significant Labs: BMP:   Recent Labs   Lab 07/25/22  1236 07/25/22 1238 07/25/22 1939 07/26/22  0321   GLU  --  108  108 120* 127*   NA  --  139  139 137 138   K  --  4.2  4.2 3.8 4.0   CL  --  107  107 105 107   CO2  --  23 23 25 22*   BUN  --  34*  34* 33* 33*   CREATININE  --  1.6*  1.6* 1.7* 1.4   CALCIUM  --  9.2  9.2 9.1 8.9   MG 2.6  --  2.3 2.6   , CMP:   Recent Labs   Lab 07/25/22 1238 07/25/22 1939 07/26/22 0321     139 137 138   K 4.2  4.2 3.8 4.0     107 105 107   CO2 23 23 25 22*     108 120* 127*   BUN 34*  34* 33* 33*   CREATININE 1.6*  1.6* 1.7* 1.4   CALCIUM 9.2  9.2 9.1 8.9   PROT 6.4  6.4 6.3 6.4   ALBUMIN 2.0*  2.0* 2.0* 2.2*   BILITOT 11.1*  11.1* 10.4* 10.5*   ALKPHOS 116  116 116 125   AST 74*  74* 66* 74*   ALT 38  38 38 40   ANIONGAP 9  9 7* 9   ESTGFRAFRICA 55.2*  55.2* 51.3* >60.0   EGFRNONAA 47.8*  47.8* 44.4* 56.2*   , and CBC:   Recent Labs   Lab 07/25/22  1236 07/25/22  1323 07/25/22 1939 07/25/22 1943 07/26/22 0321 07/26/22  0732   WBC 23.04*  --  21.39*  --  19.73*  --    HGB 7.0*  --  7.0*  --  6.7*  --    HCT 22.6*   < > 22.6*   < > 21.7* 21*     --  372  --  384  --     < > = values in this interval not displayed.           Assessment and Plan:     VT (ventricular tachycardia)  Hx of VT/VF s/p ICD 2014. Severe NICM (EF 10%) with HM2 implanted 2018. ICD infected in 2020, so explanted and SQ-ICD ([employer]) 9/2020. Admitted for pump thrombosis and successful HM2 exchange with HM3 on 7/12/22. Course c/b cardiogenic shock/RV failure needing VA ECMO, WAGNER, possible infection. Episode of MMVT on 7/21  resulting in successful ICD discharge with restoration of NSR on interrogation. Precipitant are likely recent cardiac surgery, infection,  electrolyte derangement, and on going shock needing vasopressors. On 7/24, called by primary team for frequent runs of non sustained VT without hemodynamic changes.      Recommendations   - Okay to continue amiodarone at 0.5 mg/min and lidocaine at 0.5mg/min for now until pressors have been weaned off and reversible causes of NSVT and PVCs have resolved.   - We will sign off, but please call back after pressors have been completely weaned off for oral antiarrythmic transition recs.  - Consider d/c oral amio as patient is already on amio continuous drip  - repeat electrolytes, keep mag >2, K>4         Kojaneth Dupree MD  Cardiac Electrophysiology  [facility] - [facility]

## 2022-07-26 NOTE — NURSING
Dr. Kaufman updated on patient condition via phone call. Labs, gtts, I/O's, and day's events discussed. Plan to restart giapreza gtt in order to wean off levo, change lidocaine gtt from 1 to 0.5 mg/min, give additional 250 mL of 5% albumin (250 mL given earlier this shift for increase in levo gtt to maintain MAP>70), and wean precedex as tolerated. Orders placed.

## 2022-07-26 NOTE — ASSESSMENT & PLAN NOTE
Hx of VT/VF s/p ICD 2014. Severe NICM (EF 10%) with HM2 implanted 2018. ICD infected in 2020, so explanted and SQ-ICD ([employer]) 9/2020. Admitted for pump thrombosis and successful HM2 exchange with HM3 on 7/12/22. Course c/b cardiogenic shock/RV failure needing VA ECMO, WAGNER, possible infection. Episode of MMVT on 7/21  resulting in successful ICD discharge with restoration of NSR on interrogation. Precipitant are likely recent cardiac surgery, infection, electrolyte derangement, and on going shock needing vasopressors. On 7/24, called by primary team for frequent runs of non sustained VT without hemodynamic changes.      Recommendations   - Okay to continue amiodarone at 0.5 mg/min and lidocaine at 0.5mg/min for now until pressors have been weaned off and reversible causes of NSVT and PVCs have resolved.   - We will sign off, but please call back after pressors have been completely weaned off for oral antiarrythmic transition recs.  - Consider d/c oral amio as patient is already on amio continuous drip  - repeat electrolytes, keep mag >2, K>4

## 2022-07-26 NOTE — PLAN OF CARE
Problem: Physical Therapy  Goal: Physical Therapy Goal  Description: Goals to be met by: 22    Patient will increase functional independence with mobility by performin. Supine to sit with MInimal Assistance  2. Sit to stand transfer with Contact Guard Assistance  3. Gait  x 150 feet with Contact Guard Assistance   4. Ascend/descend 8 stair with right Handrails Contact Guard Assistance   5. Sitting at edge of bed x15 minutes with Contact Guard Assistance to improve tolerance to activities.   6. Lower extremity exercise program x15 reps with assistance as needed    Outcome: Ongoing, Progressing   Evaluation completed and goals appropriate. 2022

## 2022-07-26 NOTE — PT/OT/SLP RE-EVAL
Occupational Therapy   Re-evaluation/Treatment with PT    Name: Tim Richards  MRN: [id number]  Admitting Diagnosis:  Left ventricular assist device (LVAD) complication  Recent Surgery: Procedure(s) (LRB):  REMOVAL, FOREIGN BODY (N/A) 4 Days Post-Op    Recommendations:     Discharge Recommendations: home health OT  Discharge Equipment Recommendations:   (TBD)  Barriers to discharge:  None    Assessment:     Tim Richards is a 56 y.o. male with a medical diagnosis of Left ventricular assist device (LVAD) complication.  He presents with good motivation and participation. Pt is s/p LVAD exchange 7/13/22. He is currently requiring increased assistance for functional tasks.  Performance deficits affecting function are weakness, impaired endurance, impaired self care skills, impaired functional mobility, gait instability, impaired balance, decreased safety awareness, decreased lower extremity function, decreased upper extremity function, pain, impaired cardiopulmonary response to activity. Pt would benefit from skilled OT services in order to maximize independence with ADLs and facilitate safe discharge. Pt would benefit from home health OT once medically stable for discharge.      Rehab Prognosis:  Good; patient would benefit from acute skilled OT services to address these deficits and reach maximum level of function.       Plan:     Patient to be seen 3 x/week to address the above listed problems via self-care/home management, therapeutic activities, therapeutic exercises  · Plan of Care Expires: 08/25/22  · Plan of Care Reviewed with: patient    Subjective     Chief Complaint: throat pain 2' ET tube  Patient/Family stated goals: to return home  Communicated with: RN and PT prior to session.  Pain/Comfort:  · Pain Rating 1:  (unrated throat pain 2' ET tube)    Objective:     Communicated with: RN and PT prior to session.  Patient found HOB elevated with: telemetry, arterial line, LVAD, ventilator, sun catheter,  restraints, PICC line, wound vac, NG tube (CVP; nitric oxide) upon OT entry to room.    General Precautions: Standard, LVAD, fall, sternal   Orthopedic Precautions:N/A   Braces: N/A  Respiratory Status: ET tube to vent; RT notified and deferred attending session. RT checked ET tube prior to and at conclusion of session; ET tube 23 at lip at beginning of session and 23 at teeth at conclusion of session    Occupational Performance:    Bed Mobility:    · Patient completed Scooting/Bridging with maximal assistance and 2 persons  · Patient completed Supine to Sit with maximal assistance and 2 persons  · Patient completed Sit to Supine with maximal assistance and 2 persons    Functional Mobility/Transfers:  · Unable at this time    EOB:  Pt sat EOB x5 minutes with mod-max (A) and posterior lean  Cuing to maintain neck in neutral position  Pt with c/o of dizziness upon sitting EOB, improving with increased time  Pt with c/o of SOB while EOB; RN notified and increased ventilator settings    Activities of Daily Living:  · Lower Body Dressing: maximal assistance to don B  socks    Cognitive/Visual Perceptual:  Cognitive/Psychosocial Skills:     -       Oriented to: Person, Place, Time and Situation   -       Follows Commands/attention:Follows one-step commands  -       Communication: nonverbal 2' ET Tube; responds appropriately via head nods  -       Safety awareness/insight to disability: intact   -       Mood/Affect/Coping skills/emotional control: Appropriate to situation    Physical Exam:  Sensation:    -       Intact  Upper Extremity Range of Motion:     -       Right Upper Extremity: WFL  -       Left Upper Extremity: WFL  Upper Extremity Strength:    -       Right Upper Extremity: 3/5  -       Left Upper Extremity: 3/5   Strength:    -       Right Upper Extremity: WFL  -       Left Upper Extremity: WFL    AMPAC 6 Click:  AMPAC Total Score: 11    Treatment & Education:  Education:  -Therapist provided  facilitation and instruction of proper body mechanics, energy conservation, and fall prevention strategies during tasks listed above.  -Pt educated on role of OT, POC and goals for therapy  -pt educated on 3/3 sternal precautions; requiring cuing to adhere to precautions  -Pt educated on importance of OOB activities with staff member assistance and sitting OOB majority of the day.   -Pt verbalized understanding. Pt expressed no further concerns/questions  -Whiteboard updated   -Evaluation completed with PT in the ICU to best establish plan of care for acute setting.     Patient left HOB elevated with all lines intact, call button in reach and RN and RT notified    GOALS:   Multidisciplinary Problems     Occupational Therapy Goals        Problem: Occupational Therapy    Goal Priority Disciplines Outcome Interventions   Occupational Therapy Goal     OT, PT/OT Ongoing, Progressing    Description: Goals to be met by: 8/9/22     Patient will increase functional independence with ADLs by performing:    UE Dressing with Stand-by Assistance.  LE Dressing with Stand-by Assistance.  Grooming while standing at sink with Stand-by Assistance.  Toileting from toilet with Stand-by Assistance for hygiene and clothing management.   Toilet transfer to toilet with Stand-by Assistance.               Multidisciplinary Problems (Resolved)        Problem: Occupational Therapy    Goal Priority Disciplines Outcome Interventions   Occupational Therapy Goal   (Resolved)     OT, PT/OT Met           Problem: Occupational Therapy    Goal Priority Disciplines Outcome Interventions   Occupational Therapy Goal   (Resolved)     OT, PT/OT Met                    History:     Past Medical History:   Diagnosis Date    Anticoagulant long-term use     CHF (congestive heart failure)     Class 1 obesity due to excess calories with serious comorbidity and body mass index (BMI) of 31.0 to 31.9 in adult     Dilated cardiomyopathy 1/10/2018    Disorder of  kidney and ureter     CKD    Encounter for blood transfusion     Gout     HTN (hypertension)     Hx of psychiatric care     ICD (implantable cardioverter-defibrillator) infection 7/1/2020    Psychiatric problem     Thyroid disease     Ventricular tachycardia (paroxysmal)        Past Surgical History:   Procedure Laterality Date    AORTIC VALVULOPLASTY N/A 7/13/2022    Procedure: REPAIR, AORTIC VALVE;  Surgeon: Yg Kaufman MD;  Location: [facility] OR 2ND FLR;  Service: Cardiovascular;  Laterality: N/A;    APPLICATION OF WOUND VACUUM-ASSISTED CLOSURE DEVICE N/A 7/15/2022    Procedure: APPLICATION, WOUND VAC;  Surgeon: Yg Kaufman MD;  Location: [facility] OR 2ND FLR;  Service: Cardiovascular;  Laterality: N/A;  50 x 5 cm     CARDIAC CATHETERIZATION  Dec. 2012    CARDIAC DEFIBRILLATOR PLACEMENT Left     CRRT-D    COLONOSCOPY N/A 3/6/2018    Procedure: COLONOSCOPY;  Surgeon: Alonso Bone MD;  Location: [facility] ENDO (2ND FLR);  Service: Endoscopy;  Laterality: N/A;    COLONOSCOPY N/A 7/17/2019    Procedure: COLONOSCOPY;  Surgeon: Blane Valdez MD;  Location: [facility] ENDO (2ND FLR);  Service: Endoscopy;  Laterality: N/A;    COLONOSCOPY N/A 7/18/2019    Procedure: COLONOSCOPY;  Surgeon: Blane Valdez MD;  Location: [facility] ENDO (2ND FLR);  Service: Endoscopy;  Laterality: N/A;    ESOPHAGOGASTRODUODENOSCOPY N/A 7/17/2019    Procedure: EGD (ESOPHAGOGASTRODUODENOSCOPY);  Surgeon: Blane Valdez MD;  Location: [facility] ENDO (2ND FLR);  Service: Endoscopy;  Laterality: N/A;    ESOPHAGOGASTRODUODENOSCOPY N/A 7/18/2019    Procedure: EGD (ESOPHAGOGASTRODUODENOSCOPY);  Surgeon: Blane Valdez MD;  Location: [facility] ENDO (2ND FLR);  Service: Endoscopy;  Laterality: N/A;    INSERTION OF GRAFT TO PERICARDIUM N/A 7/15/2022    Procedure: INSERTION, GRAFT, PERICARDIUM;  Surgeon: Yg Kaufman MD;  Location: [facility] OR 2ND FLR;  Service: Cardiovascular;  Laterality: N/A;    IRRIGATION OF MEDIASTINUM N/A 7/15/2022    Procedure: IRRIGATION, MEDIASTINUM;   Surgeon: Yg Kaufman MD;  Location: [address];  Service: Cardiovascular;  Laterality: N/A;    LYSIS OF ADHESIONS  7/13/2022    Procedure: LYSIS, ADHESIONS;  Surgeon: Yg Kaufman MD;  Location: [address];  Service: Cardiovascular;;    NONINVASIVE CARDIAC ELECTROPHYSIOLOGY STUDY N/A 10/18/2019    Procedure: CARDIAC ELECTROPHYSIOLOGY STUDY, NONINVASIVE;  Surgeon: Raz Wagner MD;  Location: [facility] EP LAB;  Service: Cardiology;  Laterality: N/A;  VT, DFTs, MDT CRTD in situ, LVAD, juarez MB, 3098    REPLACEMENT OF IMPLANTABLE CARDIOVERTER-DEFIBRILLATOR (ICD) GENERATOR N/A 3/9/2020    Procedure: REPLACEMENT, ICD GENERATOR;  Surgeon: Harry Yun MD;  Location: [facility] EP LAB;  Service: Cardiology;  Laterality: N/A;  VT, ICD Gen Change and Lead Revision, MDT, MAC, DM,3 Prep    REPLACEMENT OF LEFT VENTRICULAR ASSIST DEVICE (LVAD)  7/13/2022    Procedure: REPLACEMENT, LVAD;  Surgeon: Yg Kaufman MD;  Location: [address];  Service: Cardiovascular;;    REPLACEMENT OF PUMP N/A 7/13/2022    Procedure: REPLACEMENT, PUMP;  Surgeon: Yg Kaufman MD;  Location: [address];  Service: Cardiovascular;  Laterality: N/A;  LVAD pump exchange  EXPLANATION OF HEATMATE 2  IMPLANTATION OF HEARTMATE 3  IMPLANTATION OF 8MM CHIMNEY GRAFT TO RFA  INITIATION OF ECMO  TEMPORARY CLOSURE OF CHEST    REVISION OF IMPLANTABLE CARDIOVERTER-DEFIBRILLATOR (ICD) ELECTRODE LEAD PLACEMENT N/A 3/9/2020    Procedure: REVISION, INSERTION, ELECTRODE LEAD, ICD;  Surgeon: Harry Yun MD;  Location: [facility] EP LAB;  Service: Cardiology;  Laterality: N/A;  VT, ICD Gen Change and Lead Revision, MDT, MAC, DM,3 Prep    STERNAL WOUND CLOSURE N/A 7/14/2022    Procedure: CLOSURE, WOUND, STERNUM;  Surgeon: Yg Kaufman MD;  Location: [facility] OR [address] [place] [address];  Service: Cardiovascular;  Laterality: N/A;  temporary closure  evacuation of hematoma    STERNAL WOUND CLOSURE N/A 7/15/2022    Procedure: CLOSURE, WOUND, STERNUM;  Surgeon:  Yg Kaufman MD;  Location: [facility] OR [facility] FLR;  Service: Cardiovascular;  Laterality: N/A;    TREATMENT OF CARDIAC ARRHYTHMIA  10/18/2019    Procedure: Cardioversion or Defibrillation;  Surgeon: Raz Wagner MD;  Location: [facility] EP LAB;  Service: Cardiology;;       Time Tracking:     OT Date of Treatment: 07/26/22  OT Start Time: 1115  OT Stop Time: 1137  OT Total Time (min): 22 min    Billable Minutes:Evaluation 14  Therapeutic Activity 8    7/26/2022

## 2022-07-26 NOTE — PHYSICIAN QUERY
PT Name: Tim Richards  MR #: [id number]     DOCUMENTATION CLARIFICATION      CDS/: Waleska Vasquez               Contact information:[email or web address]  This form is a permanent document in the medical record.    Query Date: July 26, 2022    By submitting this query, we are merely seeking further clarification of documentation to reflect the severity of illness of your patient. Please utilize your independent clinical judgment when addressing the question(s) below.     The Medical Record contains the following:   Indicators   Supporting Clinical Findings Location in Medical Record   x Documentation/History of condition 55 yo male with previous HM2 LVAD admitted with COVID respiratory failure   Cardiac electrophysiology note 7-25    Lab Value(s)      Radiology Findings     x Treatment/Medication He remains intubated and requiring pressors and RV support with epi/NO.   Currently, patient intubated and on HD support with epi, norepinephrine, vasopressin, angiotensin II, NO. Sedated on propofol and precedex.   Cardiac electrophysiology note 7-25   x Other Hypoxia H&P      Provider, please specify the acuity/chronicity of  respiratory failure:    [   ] Acute   [   ] Other (please specify): _______________   [   ] Clinically Undetermined     Please document in your progress notes daily for the duration of treatment until resolved, and include in your discharge summary.  Form No. [id number]

## 2022-07-26 NOTE — PROGRESS NOTES
[facility] - [facility]ensive Care  Heart Transplant  Progress Note     Patient Name: Tim Richards  MRN: [id number]  Admission Date: 6/27/2022  Hospital Length of Stay: 28 days  Attending Physician: Yg Kaufman MD  Primary Care Provider: Deyanira Booth MD  Principal Problem:Left ventricular assist device (LVAD) complication    Subjective:   Overnight events:   - No acute events overnight.   - Spiking low-grade temperatures since yesterday.   - Blood and sputum cultures obtained.   - Still on lidocaine, amiodarone, lasix, heparin, vasopressin, precedex gtts. Off other pressors.   - Received an additional Lasix 10 mg IV x1 push this AM.   - Plan for extubation today.     CVP15    Intake/Output - Last 3 Shifts       07/24 0700 07/25 0659 07/25 0700 07/26 0659 07/26 0700 07/27 0659    I.V. (mL/kg) 1804.5 (17.9) 2330.6 (23.1) 534.9 (5.3)    Blood   0    NG/ 440 340    IV Piggyback 1455.2 621.6 85.8    Total Intake(mL/kg) 4029.7 (40) 3392.2 (33.7) 960.8 (9.5)    Urine (mL/kg/hr) 2545 (1.1) 2440 (1) 1030 (1.3)    Other 50 50     Total Output 2595 2490 1030    Net +1434.7 +902.2 -69.3                 Medications:   Continuous Infusions:   amiodarone in dextrose 5% 0.5 mg/min (07/26/22 1400)    angiotensin II Stopped (07/26/22 0757)    dexmedetomidine (PRECEDEX) infusion 0.8 mcg/kg/hr (07/26/22 1400)    EPINEPHrine Stopped (07/26/22 1018)    furosemide (LASIX) 2 mg/mL continuous infusion (non-titrating) 1 mg/hr (07/26/22 1400)    heparin (porcine) in 5 % dex 2,000 Units/hr (07/26/22 1400)    LIDOcaine 0.5 mg/min (07/26/22 1400)    NORepinephrine bitartrate-D5W Stopped (07/26/22 0023)    vasopressin 0.04 Units/min (07/26/22 1400)       Scheduled Meds:   amiodarone  400 mg Oral BID    aspirin  81 mg Per OG tube Daily    ceFEPime (MAXIPIME) IVPB  2 g Intravenous Q12H    guaiFENesin 100 mg/5 ml  300 mg Per NG tube Q6H    levothyroxine  112 mcg Per OG tube Before breakfast    midodrine   15 mg Per NG tube Q8H    pantoprazole  40 mg Intravenous BID    polyethylene glycol  17 g Per NG tube Daily    potassium bicarbonate  25 mEq Oral BID    warfarin  4 mg Oral Daily     PRN Meds:sodium chloride, sodium chloride 0.9%, sodium chloride 0.9%, acetaminophen, dextrose 10%, dextrose 10%, HYDROmorphone, HYDROmorphone  Objective:     Vitals:  Temp:  [98.96 °F (37.2 °C)-100.94 °F (38.3 °C)]   Pulse:  []   Resp:  [19-34]   BP: (72-85)/(0-75)   SpO2:  [49 %-99 %]   Arterial Line BP: (69-95)/(59-84)   I/O's:    Intake/Output Summary (Last 24 hours) at 7/26/2022 1446  Last data filed at 7/26/2022 1400  Gross per 24 hour   Intake 3534.54 ml   Output 2725 ml   Net 809.54 ml       Constitutional: No distress, obese, conversant  Constitutional:       General: He is not in acute distress.     Appearance: He is obese. He is ill-appearing.      Comments: Intubated   HENT:      Head: Normocephalic.      Nose: Nose normal.      Mouth/Throat:      Mouth: Mucous membranes are moist.   Eyes:      General: Scleral icterus present.   Cardiovascular:      Rate and Rhythm: Normal rate and regular rhythm.      Comments: VAD hum appreciated  Pulses detected via doppler  Pulmonary:      Comments: Coarse mechanical breath sounds bilaterally  Abdominal:      General: Bowel sounds are normal.      Palpations: Abdomen is soft.   Musculoskeletal:      Cervical back: Neck supple.      Right lower leg: No edema.      Left lower leg: No edema.   Skin:     General: Skin is warm.   Neurological:      General: No focal deficit present.     Labs:     Lab Results   Component Value Date    HGBA1C 5.4 04/26/2021     Lab Results   Component Value Date    BNP 2,301 (H) 07/25/2022    BNP 2,579 (H) 07/22/2022     (H) 07/20/2022     Lab Results   Component Value Date    CHOL 130 05/19/2022    HDL 46 05/19/2022    LDLCALC 54.8 (L) 05/19/2022    TRIG 43 07/14/2022         Micro:   Blood Cultures  Lab Results   Component Value Date     LABBLOO No Growth to date 07/22/2022    LABBLOO No Growth to date 07/22/2022    LABBLOO No Growth to date 07/22/2022    LABBLOO No Growth to date 07/22/2022    LABBLOO No Growth to date 07/22/2022     Urine Cultures  Lab Results   Component Value Date    LABURIN No growth 03/23/2018    LABURIN No growth 03/15/2018     Sputum Cultures    Imaging:     EF   Date Value Ref Range Status   07/21/2022 10 % Final   07/08/2022 13 % Final   07/01/2022 10 % Final   06/28/2022 10 % Final   05/19/2022 10 % Final       TTE: {Echo:   EF   Date Value Ref Range Status   07/21/2022 10 % Final   07/08/2022 13 % Final   07/01/2022 10 % Final           Assessment/Plan     55 Y/O M with Hx of stage D HFrEF (EF 10%) due to NICM underwent HM2 implant 3/8/18 and exchange of outflow graft 3/9/18, Hx VT/VF s/p SICD 2014 presenting with gradual worsening shortness of breath associated with nonpleuritic, nonradiating bilateral 4/10 retrosternal chest pressure pain.  Symptoms began yesterday and have gradually worsened in the last 12 hours.  He does report going to a family picnic with increased consumption of chicken wings, ribs and other salty meat products.  Prior to symptom onset, he reports nausea, nonbloody diarrhea began yesterday and single episode of nonbloody nonbilious vomiting this morning. He also complains of dizziness, lightheadedness, and a mild HA. SOB worsened this morning prompting him to come to the ED.      In the ED, patient was in respiratory distress requiring BiPAP. MAP 96 and started on Nitro gtt. Work-up revealed WBC 10, K 5.4, Cr 2.5 (baseline 1.9), BNP 1950 (baseline 200-300s), Bili 2.1, LDH 1058, and INR 3.2. Bedside TTE with severely reduced EF, AV not opening, septum bulging into RV. Given IV Lasix 80 mg x1 with only 100-200 mL UOP and dark in color. HM2 interrogated and flows were 8.5-9 L/min with no alarms or power surges. Cardiology consulted in the ED, dicussed with HTS, and decision made to admit to ICU for  further mgmt.       * Left ventricular assist device (LVAD) complication  The patient presented with COVID and found to have an uptrending LDH with increased power.  He underwent HM III upgrade on 7/13/22  -Daily LDH   -Continue Heparin drip     Fevers:       - Source unclear. Sputum with K aerogenes. DDx includes infection around driveline.       - Blood cultures, sputum cultures obtained. To be followed.        - Currently on antibiotics (cefepime). ID following.        - Consider CT abdomen to rule out fluid collection around driveline.     Chronic combined systolic and diastolic heart failure  ADHF  Underwent HM III upgrade on 7/13/22  Currently on Vasopressin. Off Epi gtt, Angiotension II  Currently on Precedex gtt  Continue Lasix gtt. Consider increasing rate as the pt is net fluid positive > +8.5L since admission.   Being treated for sepsis  Currently intubated - plan for extubation today  Chest tube removal, bronch, and old driveline removed 7/22    History of ventricular tachycardia  Patient experienced an episode of VT on 6/30 and experienced an ICD shock thought to be related to hypokalemia (K of 3.1 on 6/30)  - Aggressively replete K, keep K>4 and Mg>2  - Continue lidocaine gtt, and amiodarone gtt.   - EP signed off and recommending Amiodarone and Mexilitine when pt can safely take PO.   - Would try to wean Lidocaine gtt if able  - Continue to monitor on telemetry     COVID-19 - resolved  -Previously hypoxic then recovered  -ID was consulted, recommended Remdesivir, course completed     Elevated serum lactate dehydrogenase (LDH)  S/p HM3 exchange for HM2 pump thrombosis  Trend LDH daily     Amiodarone induced hypothyrodism  -Continue levothyroxine 112 mcg   - Repeat TFTs tomorrow 7/27.      CKD (chronic kidney disease), stage III  WAGNER on CKD  Patient's baseline is 1.5-2.0  - Nephrology is following  - Avoiding nephrotoxic medications  - Continue to monitor  - Strict I/O's     Anxiety  -Currently  intubated and sedated        Signed:  Oliverio Soriano MD, MSCI  [employer] - Cardiology Fellow (visiting)  Heart transplant Service

## 2022-07-26 NOTE — SUBJECTIVE & OBJECTIVE
Interval History: ". Patient remains intubated and febrile. BAL culture on 7/20 now with K aerogenes. Blood cultures remain NGTD. Yellow mucoid drainage noted at DLES. Brown yellow secretions at prior chest tube exit site. Transitioned from meropenem to cefepime on 7/25. Low grade fever overnight.     Review of Systems   Unable to perform ROS: Intubated   Objective:     Vital Signs (Most Recent):  Temp: (!) 100.4 °F (38 °C) (07/26/22 1106)  Pulse: 77 (07/26/22 1106)  Resp: (!) 26 (07/26/22 0706)  BP: (!) 80/0 (07/26/22 0300)  SpO2:  (UNABLE TO ) (07/26/22 0706)   Vital Signs (24h Range):  Temp:  [98.96 °F (37.2 °C)-100.76 °F (38.2 °C)] 100.4 °F (38 °C)  Pulse:  [] 77  Resp:  [19-34] 26  SpO2:  [93 %-99 %] 97 %  BP: (72-90)/(0-79) 80/0  Arterial Line BP: ()/(61-84) 80/68     Weight: 100.7 kg (222 lb)  Body mass index is 29.29 kg/m².    Estimated Creatinine Clearance: 74.4 mL/min (based on SCr of 1.4 mg/dL).    Physical Exam  Vitals and nursing note reviewed. Exam conducted with a chaperone present.   Constitutional:       General: He is sleeping.      Appearance: He is ill-appearing.      Interventions: He is sedated and intubated.   HENT:      Head: Normocephalic and atraumatic.   Eyes:      General: Scleral icterus present.         Right eye: No discharge.         Left eye: No discharge.      Conjunctiva/sclera: Conjunctivae normal.   Cardiovascular:      Pulses: Normal pulses.      Comments: Distant VAD Humming sounds  Pulmonary:      Effort: Pulmonary effort is normal. No respiratory distress. He is intubated.      Breath sounds: No stridor. Rales present. No wheezing or rhonchi.   Abdominal:      General: Bowel sounds are decreased. There is no distension.      Palpations: Abdomen is soft.      Tenderness: There is no abdominal tenderness.          Comments: Left sided DLES covered with gauze dressing soiled with yellow sanguinous secretions.    Musculoskeletal:         General: Normal range  of motion.   Skin:     General: Skin is warm and dry.   Neurological:      General: No focal deficit present.      Mental Status: He is easily aroused.      Comments: Awakens with verbal stimuli. Shakes head for yes/no questions.                Significant Labs: Blood Culture:   Recent Labs   Lab 07/20/22  1445 07/20/22  1450 07/22/22  0857 07/22/22  0918   LABBLOO No growth after 5 days. No growth after 5 days. No Growth to date  No Growth to date  No Growth to date  No Growth to date  No Growth to date No Growth to date  No Growth to date  No Growth to date  No Growth to date  No Growth to date       BMP:   Recent Labs   Lab 07/26/22  0321   *      K 4.0      CO2 22*   BUN 33*   CREATININE 1.4   CALCIUM 8.9   MG 2.6       CBC:   Recent Labs   Lab 07/25/22  1236 07/25/22  1323 07/25/22  1939 07/25/22  1943 07/26/22  0128 07/26/22  0321 07/26/22  0732   WBC 23.04*  --  21.39*  --   --  19.73*  --    HGB 7.0*  --  7.0*  --   --  6.7*  --    HCT 22.6*   < > 22.6*   < > 25* 21.7* 21*     --  372  --   --  384  --     < > = values in this interval not displayed.       Respiratory Culture:   Recent Labs   Lab 07/20/22  1120   GSRESP Few WBC's  Rare Gram positive cocci  Rare Gram negative rods       Urine Culture: No results for input(s): LABURIN in the last 4320 hours.  Urine Studies:   Recent Labs   Lab 06/28/22  1137   COLORU Yellow   APPEARANCEUA Clear   PHUR 5.0   SPECGRAV 1.010   PROTEINUA Negative   GLUCUA Negative   KETONESU Negative   BILIRUBINUA Negative   OCCULTUA 2+*   NITRITE Negative   LEUKOCYTESUR Negative   RBCUA 0   WBCUA 2   SQUAMEPITHEL 0   HYALINECASTS 4*       Wound Culture: No results for input(s): LABAERO in the last 4320 hours.    Significant Imaging: I have reviewed all pertinent imaging results/findings within the past 24 hours.

## 2022-07-26 NOTE — PLAN OF CARE
Problem: Occupational Therapy  Goal: Occupational Therapy Goal  Description: Goals to be met by: 8/9/22     Patient will increase functional independence with ADLs by performing:    UE Dressing with Stand-by Assistance.  LE Dressing with Stand-by Assistance.  Grooming while standing at sink with Stand-by Assistance.  Toileting from toilet with Stand-by Assistance for hygiene and clothing management.   Toilet transfer to toilet with Stand-by Assistance.    Outcome: Ongoing, Progressing

## 2022-07-26 NOTE — ASSESSMENT & PLAN NOTE
Leukocytosis with associated fever post-decannulation. Blcx so far ngtd, resp cx unrevealing. Pt is at risk for fungal infection (prior lines, multiple surgeries). S/P HM3 with removal of old driveline. Up-escalated to meropenem due to acute decompensation.     Sputum cultures with K aerogenes. Meropenem transitioned to cefepime on 7/25. Leukocytosis stable. Febrile overnight. DLES cultures performed on 7/25.  · Continue cefepime.  · Repeat sputum, and blood cultures. Consider urinalysis with reflex to culture.  · Further antibiotic optimization with culture results.

## 2022-07-26 NOTE — PT/OT/SLP RE-EVAL
Physical Therapy Co- Re-evaluation and co-treatment with OT    Patient Name:  Tim Richards   MRN:  [id number]    Recommendations:     Discharge Recommendations:  home health PT   Discharge Equipment Recommendations:  (will determine DME needs closer to discharge)   Barriers to discharge: None    Assessment:     Tim Richards is a 56 y.o. male admitted with a medical diagnosis of Left ventricular assist device (LVAD) complication.  He presents with the following impairments/functional limitations:  weakness, impaired endurance, impaired functional mobility, gait instability, impaired balance, decreased safety awareness, decreased lower extremity function pt tolerated treatment well and will benefit from skilled PT 3x/wk to progress physically. Pt should be able to discharge home when medically stable. Pt was evaluated and discharged 6/29 and 7/6 due to being Independent. Pt had LVAD pump exchange 7/22/22.    SOCIAL:pt lives with his wife in 2 story with B&B upstairs and 4 steps with no rail. Pt is independent with mobility and owns QC. Pt wife will assist after discharge.     Rehab Prognosis:  Good ; patient would benefit from acute skilled PT services to address these deficits and reach maximum level of function.      Recent Surgery: Procedure(s) (LRB):  REMOVAL, FOREIGN BODY (N/A) 4 Days Post-Op    Plan:     During this hospitalization, patient to be seen 3 x/week to address the above listed problems via gait training, therapeutic activities, therapeutic exercises, neuromuscular re-education  · Plan of Care Expires:  08/20/22   Plan of Care Reviewed with: patient    Subjective     Communicated with nurse prior to session.  Patient found supine with telemetry, arterial line, LVAD, ventilator, sun catheter, restraints, PICC line, wound vac, NG tube (CVP, vent to ET tube, nitric oxide, BUE restraints.) upon PT entry to room, agreeable to evaluation.      Chief Complaint: Pt indicated that he was dizzy with  "sitting on EOB.   Patient comments/goals: pt not able to set goals and no family was present to set goals.   Pain/Comfort:  · Pain Rating 1: 0/10  · Pain Rating Post-Intervention 1: 0/10    Patients cultural, spiritual, Hindu conflicts given the current situation: no      Objective:     Patient found with: telemetry, arterial line, LVAD, ventilator, sun catheter, restraints, PICC line, wound vac, NG tube (CVP, vent to ET tube, nitric oxide, BUE restraints.)     General Precautions: Standard, LVAD, fall, sternal   Orthopedic Precautions:N/A   Braces:    Respiratory Status: trach to ET tube    Exams:  · Cognitive Exam:  Patient is oriented to pt was able to follow commands.  ·   · RLE ROM: WFL  · RLE Strength: WFL  · LLE ROM: WFL  · LLE Strength: WFL    Functional Mobility:  · Bed Mobility:   Pt needed verbal cues for functional mobility with sternal precautions. Respiratory therapy approved treatment at current vent settings and visually inspected ET tube prior to and after treatment.   · Rolling Right: maximal assistance and of 2 persons  · Supine to Sit: maximal assistance and of 2 persons  · Sit to Supine: maximal assistance and of 2 persons  ·   · Balance: pt sat on EOB x 5 min with mod to max assist for sitting balance. pt needed verbal cues and tactile cues at the trunk for upright posture with sitting. Pt ET tube was 23 at lip prior to and after treatment. Pt indicated that he was SOB with treatment, RN gave pt boost of O2  via vent settings during treatment.     Due to pt complex medical condition, the skill of 2 licensed therapists is needed to maximize treatment session and progression towards goals      AM-PAC 6 CLICK MOBILITY  Total Score:9       Therapeutic Activities and Exercises:   pt received verbal instructions in role of PT and POC. Pt nodded "yes" that he understood.     Patient left supine with all lines intact, call button in reach and RN notified.    GOALS:   Multidisciplinary Problems  "    Physical Therapy Goals        Problem: Physical Therapy    Goal Priority Disciplines Outcome Goal Variances Interventions   Physical Therapy Goal     PT, PT/OT Ongoing, Progressing     Description: Goals to be met by: 22    Patient will increase functional independence with mobility by performin. Supine to sit with MInimal Assistance  2. Sit to stand transfer with Contact Guard Assistance  3. Gait  x 150 feet with Contact Guard Assistance   4. Ascend/descend 8 stair with right Handrails Contact Guard Assistance   5. Sitting at edge of bed x15 minutes with Contact Guard Assistance to improve tolerance to activities.   6. Lower extremity exercise program x15 reps with assistance as needed               Multidisciplinary Problems (Resolved)        Problem: Physical Therapy    Goal Priority Disciplines Outcome Goal Variances Interventions   Physical Therapy Goal   (Resolved)     PT, PT/OT Met     Description: The patient is near his functional baseline, he ambulated within the room with no AD and independence. Unable to do stair training, assess gait endurance due to COVID restrictions. He is safe to return home with no therapy needs. He is in agreement with PT discharge, he states he is confident he can ascend/descend his stairs.           Problem: Physical Therapy    Goal Priority Disciplines Outcome Goal Variances Interventions   Physical Therapy Goal   (Resolved)     PT, PT/OT Met                     History:     Past Medical History:   Diagnosis Date    Anticoagulant long-term use     CHF (congestive heart failure)     Class 1 obesity due to excess calories with serious comorbidity and body mass index (BMI) of 31.0 to 31.9 in adult     Dilated cardiomyopathy 1/10/2018    Disorder of kidney and ureter     CKD    Encounter for blood transfusion     Gout     HTN (hypertension)     Hx of psychiatric care     ICD (implantable cardioverter-defibrillator) infection 2020    Psychiatric problem      Thyroid disease     Ventricular tachycardia (paroxysmal)        Past Surgical History:   Procedure Laterality Date    AORTIC VALVULOPLASTY N/A 7/13/2022    Procedure: REPAIR, AORTIC VALVE;  Surgeon: Yg Kaufman MD;  Location: [facility] OR 2ND FLR;  Service: Cardiovascular;  Laterality: N/A;    APPLICATION OF WOUND VACUUM-ASSISTED CLOSURE DEVICE N/A 7/15/2022    Procedure: APPLICATION, WOUND VAC;  Surgeon: Yg Kaufman MD;  Location: [facility] OR 2ND FLR;  Service: Cardiovascular;  Laterality: N/A;  50 x 5 cm     CARDIAC CATHETERIZATION  Dec. 2012    CARDIAC DEFIBRILLATOR PLACEMENT Left     CRRT-D    COLONOSCOPY N/A 3/6/2018    Procedure: COLONOSCOPY;  Surgeon: Alonso Bone MD;  Location: [facility] ENDO (2ND FLR);  Service: Endoscopy;  Laterality: N/A;    COLONOSCOPY N/A 7/17/2019    Procedure: COLONOSCOPY;  Surgeon: Blane Valdez MD;  Location: [facility] ENDO (2ND FLR);  Service: Endoscopy;  Laterality: N/A;    COLONOSCOPY N/A 7/18/2019    Procedure: COLONOSCOPY;  Surgeon: Blane Valdez MD;  Location: [facility] ENDO (2ND FLR);  Service: Endoscopy;  Laterality: N/A;    ESOPHAGOGASTRODUODENOSCOPY N/A 7/17/2019    Procedure: EGD (ESOPHAGOGASTRODUODENOSCOPY);  Surgeon: Blane Valdez MD;  Location: [facility] ENDO (2ND FLR);  Service: Endoscopy;  Laterality: N/A;    ESOPHAGOGASTRODUODENOSCOPY N/A 7/18/2019    Procedure: EGD (ESOPHAGOGASTRODUODENOSCOPY);  Surgeon: Blane Valdez MD;  Location: [facility] ENDO (2ND FLR);  Service: Endoscopy;  Laterality: N/A;    INSERTION OF GRAFT TO PERICARDIUM N/A 7/15/2022    Procedure: INSERTION, GRAFT, PERICARDIUM;  Surgeon: gY Kaufman MD;  Location: [facility] OR 2ND FLR;  Service: Cardiovascular;  Laterality: N/A;    IRRIGATION OF MEDIASTINUM N/A 7/15/2022    Procedure: IRRIGATION, MEDIASTINUM;  Surgeon: Yg Kaufman MD;  Location: [facility] OR 2ND FLR;  Service: Cardiovascular;  Laterality: N/A;    LYSIS OF ADHESIONS  7/13/2022    Procedure: LYSIS, ADHESIONS;  Surgeon: Yg Kaufman MD;  Location: [facility] OR [facility]  FLR;  Service: Cardiovascular;;    NONINVASIVE CARDIAC ELECTROPHYSIOLOGY STUDY N/A 10/18/2019    Procedure: CARDIAC ELECTROPHYSIOLOGY STUDY, NONINVASIVE;  Surgeon: Raz Wagner MD;  Location: [facility] EP LAB;  Service: Cardiology;  Laterality: N/A;  VT, DFTs, MDT CRTD in situ, LVAD, juarez, MB, 3098    REPLACEMENT OF IMPLANTABLE CARDIOVERTER-DEFIBRILLATOR (ICD) GENERATOR N/A 3/9/2020    Procedure: REPLACEMENT, ICD GENERATOR;  Surgeon: Harry Yun MD;  Location: [facility] EP LAB;  Service: Cardiology;  Laterality: N/A;  VT, ICD Gen Change and Lead Revision, MDT, MAC, DM,3 Prep    REPLACEMENT OF LEFT VENTRICULAR ASSIST DEVICE (LVAD)  7/13/2022    Procedure: REPLACEMENT, LVAD;  Surgeon: Yg Kaufman MD;  [facility];  Service: Cardiovascular;;    REPLACEMENT OF PUMP N/A 7/13/2022    Procedure: REPLACEMENT, PUMP;  Surgeon: Yg Kaufman MD;  [facility];  Service: Cardiovascular;  Laterality: N/A;  LVAD pump exchange  EXPLANATION OF HEATMATE 2  IMPLANTATION OF HEARTMATE 3  IMPLANTATION OF 8MM CHIMNEY GRAFT TO RFA  INITIATION OF ECMO  TEMPORARY CLOSURE OF CHEST    REVISION OF IMPLANTABLE CARDIOVERTER-DEFIBRILLATOR (ICD) ELECTRODE LEAD PLACEMENT N/A 3/9/2020    Procedure: REVISION, INSERTION, ELECTRODE LEAD, ICD;  Surgeon: Harry Yun MD;  Location: [facility] EP LAB;  Service: Cardiology;  Laterality: N/A;  VT, ICD Gen Change and Lead Revision, MDT, MAC, DM,3 Prep    STERNAL WOUND CLOSURE N/A 7/14/2022    Procedure: CLOSURE, WOUND, STERNUM;  Surgeon: Yg Kaufman MD;  Location: [facility] OR [facility] FLR;  Service: Cardiovascular;  Laterality: N/A;  temporary closure  evacuation of hematoma    STERNAL WOUND CLOSURE N/A 7/15/2022    Procedure: CLOSURE, WOUND, STERNUM;  Surgeon: Yg Kaufman MD;  Location: [facility] OR [facility] FLR;  Service: Cardiovascular;  Laterality: N/A;    TREATMENT OF CARDIAC ARRHYTHMIA  10/18/2019    Procedure: Cardioversion or Defibrillation;  Surgeon: Raz Wagner MD;   Location: [facility] EP LAB;  Service: Cardiology;;       Time Tracking:     PT Received On: 07/26/22  PT Start Time: 1115     PT Stop Time: 1137  PT Total Time (min): 22 min     Billable Minutes: Re-eval 8 min and Neuromuscular Re-education 14 min      07/26/2022

## 2022-07-26 NOTE — PROGRESS NOTES
"[facility] - Surgical Intensive Care  Infectious Disease  Progress Note    Patient Name: Tim Richards  MRN: [id number]  Admission Date: 6/27/2022  Length of Stay: 29 days  Attending Physician: Yg Kaufman MD  Primary Care Provider: Deyanira Booth MD    Isolation Status: No active isolations  Assessment/Plan:      Shock  Likely multifactorial . On multiple pressor support.   · Management per primary.    Leukocytosis  Leukocytosis with associated fever post-decannulation. Blcx so far ngtd, resp cx unrevealing. Pt is at risk for fungal infection (prior lines, multiple surgeries). S/P HM3 with removal of old driveline. Up-escalated to meropenem due to acute decompensation.     Sputum cultures with K aerogenes. Meropenem transitioned to cefepime on 7/25. Leukocytosis stable. Febrile overnight. DLES cultures performed on 7/25.  · Continue cefepime.  · Repeat sputum, and blood cultures. Consider urinalysis with reflex to culture.  · Further antibiotic optimization with culture results.          Anticipated Disposition: per primary    Thank you for your consult. I will follow-up with patient. Please contact us if you have any additional questions.    Roxie Garg MD  Infectious Disease  [facility] - Surgical Intensive Care    Subjective:     Principal Problem:Left ventricular assist device (LVAD) complication    HPI: A 56-year-old man with HFrEF-10%, s/p VAD HM2 (March 2018), ICD, VT on amiodarone who developed malaise, anorexia, SOB, dark urine, and soft stools 3-4 days prior to admission. He was evaluated in [facility] at which time he tested positive for Covid-19 infection. He was admitted for further management and started on Remdesivir treatment protocol. Since admission, he feels significantly improved with bowel movements returning to normal and the shortness of breath subsiding.     Infectious Diseases consulted for "covid infection, acute. Patient with LVAD"    ID reconsulted 7/21 for "sepsis and " "consideration for fungemia". Since pt was last seen by ID, patient underwent AV repair, redo sternotomy, explant of old lvad HM2 with implantation of HM3, and placed on VA-ECMO, takeback 7/14 for mediastinal washout/hematoma, and washout, sternal closure, creation of moises-pericardium (gerotex membrane) and wound vac placement on 7/15. Decannulated ECMO on 7/19 with subsequent fever and hypotension. Pt was placed on broad spectrum abx. Blcx obtained from 7/20 ngtd. S/p bronch on 7/20 - cx with normal respiratory sachin.           Interval History: "". Patient remains intubated and febrile. BAL culture on 7/20 now with K aerogenes. Blood cultures remain NGTD. Yellow mucoid drainage noted at DLES. Brown yellow secretions at prior chest tube exit site. Transitioned from meropenem to cefepime on 7/25. Low grade fever overnight.     Review of Systems   Unable to perform ROS: Intubated   Objective:     Vital Signs (Most Recent):  Temp: (!) 100.4 °F (38 °C) (07/26/22 1106)  Pulse: 77 (07/26/22 1106)  Resp: (!) 26 (07/26/22 0706)  BP: (!) 80/0 (07/26/22 0300)  SpO2:  (UNABLE TO ) (07/26/22 0706)   Vital Signs (24h Range):  Temp:  [98.96 °F (37.2 °C)-100.76 °F (38.2 °C)] 100.4 °F (38 °C)  Pulse:  [] 77  Resp:  [19-34] 26  SpO2:  [93 %-99 %] 97 %  BP: (72-90)/(0-79) 80/0  Arterial Line BP: ()/(61-84) 80/68     Weight: 100.7 kg (222 lb)  Body mass index is 29.29 kg/m².    Estimated Creatinine Clearance: 74.4 mL/min (based on SCr of 1.4 mg/dL).    Physical Exam  Vitals and nursing note reviewed. Exam conducted with a chaperone present.   Constitutional:       General: He is sleeping.      Appearance: He is ill-appearing.      Interventions: He is sedated and intubated.   HENT:      Head: Normocephalic and atraumatic.   Eyes:      General: Scleral icterus present.         Right eye: No discharge.         Left eye: No discharge.      Conjunctiva/sclera: Conjunctivae normal.   Cardiovascular:      Pulses: Normal " pulses.      Comments: Distant VAD Humming sounds  Pulmonary:      Effort: Pulmonary effort is normal. No respiratory distress. He is intubated.      Breath sounds: No stridor. Rales present. No wheezing or rhonchi.   Abdominal:      General: Bowel sounds are decreased. There is no distension.      Palpations: Abdomen is soft.      Tenderness: There is no abdominal tenderness.          Comments: Left sided DLES covered with gauze dressing soiled with yellow sanguinous secretions.    Musculoskeletal:         General: Normal range of motion.   Skin:     General: Skin is warm and dry.   Neurological:      General: No focal deficit present.      Mental Status: He is easily aroused.      Comments: Awakens with verbal stimuli. Shakes head for yes/no questions.                Significant Labs: Blood Culture:   Recent Labs   Lab 07/20/22  1445 07/20/22  1450 07/22/22  0857 07/22/22  0918   LABBLOO No growth after 5 days. No growth after 5 days. No Growth to date  No Growth to date  No Growth to date  No Growth to date  No Growth to date No Growth to date  No Growth to date  No Growth to date  No Growth to date  No Growth to date       BMP:   Recent Labs   Lab 07/26/22  0321   *      K 4.0      CO2 22*   BUN 33*   CREATININE 1.4   CALCIUM 8.9   MG 2.6       CBC:   Recent Labs   Lab 07/25/22  1236 07/25/22  1323 07/25/22  1939 07/25/22  1943 07/26/22  0128 07/26/22  0321 07/26/22  0732   WBC 23.04*  --  21.39*  --   --  19.73*  --    HGB 7.0*  --  7.0*  --   --  6.7*  --    HCT 22.6*   < > 22.6*   < > 25* 21.7* 21*     --  372  --   --  384  --     < > = values in this interval not displayed.       Respiratory Culture:   Recent Labs   Lab 07/20/22  1120   GSRESP Few WBC's  Rare Gram positive cocci  Rare Gram negative rods       Urine Culture: No results for input(s): LABURIN in the last 4320 hours.  Urine Studies:   Recent Labs   Lab 06/28/22  1137   COLORU Yellow   APPEARANCEUA Clear    PHUR 5.0   SPECGRAV 1.010   PROTEINUA Negative   GLUCUA Negative   KETONESU Negative   BILIRUBINUA Negative   OCCULTUA 2+*   NITRITE Negative   LEUKOCYTESUR Negative   RBCUA 0   WBCUA 2   SQUAMEPITHEL 0   HYALINECASTS 4*       Wound Culture: No results for input(s): LABAERO in the last 4320 hours.    Significant Imaging: I have reviewed all pertinent imaging results/findings within the past 24 hours.

## 2022-07-26 NOTE — ASSESSMENT & PLAN NOTE
Hx of VT/VF s/p ICD 2014. Severe NICM (EF 10%) with HM2 implanted 2018. ICD infected in 2020, so explanted and SQ-ICD ([employer]) 9/2020. Admitted for pump thrombosis and successful HM2 exchange with HM3 on 7/12/22. Course c/b cardiogenic shock/RV failure needing VA ECMO, WAGNER, possible infection. Episode of MMVT on 7/21  resulting in successful ICD discharge with restoration of NSR on interrogation. Precipitant are likely recent cardiac surgery, infection, electrolyte derangement, and on going shock needing vasopressors. On 7/24, called by primary team for frequent runs of non sustained VT without hemodynamic changes.      Recommendations   - Okay to continue amiodarone at 0.5 mg/min and lidocaine at 0.5mg/min for now until pressors have been weaned off and patient extubated.   - We will sign off, but please call back after pressors have been completely weaned off for oral antiarrythmic transition recs.  - Consider d/c oral amio as patient is already on amio continuous drip  - repeat electrolytes, keep mag >2, K>4

## 2022-07-27 LAB
ALBUMIN SERPL BCP-MCNC: 2.2 G/DL (ref 3.5–5.2)
ALBUMIN SERPL BCP-MCNC: 2.2 G/DL (ref 3.5–5.2)
ALBUMIN SERPL BCP-MCNC: 2.3 G/DL (ref 3.5–5.2)
ALBUMIN SERPL BCP-MCNC: 2.3 G/DL (ref 3.5–5.2)
ALLENS TEST: ABNORMAL
ALP SERPL-CCNC: 137 U/L (ref 55–135)
ALP SERPL-CCNC: 137 U/L (ref 55–135)
ALP SERPL-CCNC: 140 U/L (ref 55–135)
ALP SERPL-CCNC: 143 U/L (ref 55–135)
ALT SERPL W/O P-5'-P-CCNC: 48 U/L (ref 10–44)
ALT SERPL W/O P-5'-P-CCNC: 48 U/L (ref 10–44)
ALT SERPL W/O P-5'-P-CCNC: 51 U/L (ref 10–44)
ALT SERPL W/O P-5'-P-CCNC: 51 U/L (ref 10–44)
ANION GAP SERPL CALC-SCNC: 11 MMOL/L (ref 8–16)
ANION GAP SERPL CALC-SCNC: 12 MMOL/L (ref 8–16)
ANION GAP SERPL CALC-SCNC: 14 MMOL/L (ref 8–16)
ANISOCYTOSIS BLD QL SMEAR: SLIGHT
APTT BLDCRRT: 36.5 SEC (ref 21–32)
APTT BLDCRRT: 40.7 SEC (ref 21–32)
APTT BLDCRRT: 41.3 SEC (ref 21–32)
AST SERPL-CCNC: 76 U/L (ref 10–40)
AST SERPL-CCNC: 78 U/L (ref 10–40)
AST SERPL-CCNC: 78 U/L (ref 10–40)
AST SERPL-CCNC: 84 U/L (ref 10–40)
BACTERIA #/AREA URNS AUTO: ABNORMAL /HPF
BACTERIA BLD CULT: NORMAL
BACTERIA BLD CULT: NORMAL
BASO STIPL BLD QL SMEAR: ABNORMAL
BASOPHILS # BLD AUTO: 0.03 K/UL (ref 0–0.2)
BASOPHILS NFR BLD: 0.1 % (ref 0–1.9)
BILIRUB DIRECT SERPL-MCNC: 7.2 MG/DL (ref 0.1–0.3)
BILIRUB SERPL-MCNC: 9.2 MG/DL (ref 0.1–1)
BILIRUB SERPL-MCNC: 9.4 MG/DL (ref 0.1–1)
BILIRUB UR QL STRIP: NEGATIVE
BNP SERPL-MCNC: 3441 PG/ML (ref 0–99)
BUN SERPL-MCNC: 35 MG/DL (ref 6–20)
BUN SERPL-MCNC: 38 MG/DL (ref 6–20)
BUN SERPL-MCNC: 46 MG/DL (ref 6–20)
CALCIUM SERPL-MCNC: 9.2 MG/DL (ref 8.7–10.5)
CALCIUM SERPL-MCNC: 9.3 MG/DL (ref 8.7–10.5)
CALCIUM SERPL-MCNC: 9.5 MG/DL (ref 8.7–10.5)
CHLORIDE SERPL-SCNC: 105 MMOL/L (ref 95–110)
CHLORIDE SERPL-SCNC: 106 MMOL/L (ref 95–110)
CHLORIDE SERPL-SCNC: 108 MMOL/L (ref 95–110)
CLARITY UR REFRACT.AUTO: ABNORMAL
CO2 SERPL-SCNC: 22 MMOL/L (ref 23–29)
CO2 SERPL-SCNC: 22 MMOL/L (ref 23–29)
CO2 SERPL-SCNC: 23 MMOL/L (ref 23–29)
COLOR UR AUTO: ABNORMAL
CREAT SERPL-MCNC: 1.5 MG/DL (ref 0.5–1.4)
CREAT SERPL-MCNC: 1.6 MG/DL (ref 0.5–1.4)
CREAT SERPL-MCNC: 1.8 MG/DL (ref 0.5–1.4)
CRP SERPL-MCNC: 99.8 MG/L (ref 0–8.2)
DELSYS: ABNORMAL
DIFFERENTIAL METHOD: ABNORMAL
EOSINOPHIL # BLD AUTO: 0 K/UL (ref 0–0.5)
EOSINOPHIL NFR BLD: 0 % (ref 0–8)
EOSINOPHIL NFR BLD: 0 % (ref 0–8)
EOSINOPHIL NFR BLD: 0.1 % (ref 0–8)
ERYTHROCYTE [DISTWIDTH] IN BLOOD BY AUTOMATED COUNT: 21.2 % (ref 11.5–14.5)
ERYTHROCYTE [DISTWIDTH] IN BLOOD BY AUTOMATED COUNT: 21.4 % (ref 11.5–14.5)
ERYTHROCYTE [DISTWIDTH] IN BLOOD BY AUTOMATED COUNT: 21.6 % (ref 11.5–14.5)
EST. GFR  (AFRICAN AMERICAN): 47.9 ML/MIN/1.73 M^2
EST. GFR  (AFRICAN AMERICAN): 55.2 ML/MIN/1.73 M^2
EST. GFR  (AFRICAN AMERICAN): 59.7 ML/MIN/1.73 M^2
EST. GFR  (NON AFRICAN AMERICAN): 41.4 ML/MIN/1.73 M^2
EST. GFR  (NON AFRICAN AMERICAN): 47.8 ML/MIN/1.73 M^2
EST. GFR  (NON AFRICAN AMERICAN): 51.7 ML/MIN/1.73 M^2
FACT X PPP CHRO-ACNC: 0.27 IU/ML (ref 0.3–0.7)
FACT X PPP CHRO-ACNC: 0.29 IU/ML (ref 0.3–0.7)
FACT X PPP CHRO-ACNC: 0.3 IU/ML (ref 0.3–0.7)
FIBRINOGEN PPP-MCNC: 754 MG/DL (ref 182–400)
FIO2: 70
FLOW: 11
FLOW: 35
GIANT PLATELETS BLD QL SMEAR: PRESENT
GLUCOSE SERPL-MCNC: 103 MG/DL (ref 70–110)
GLUCOSE SERPL-MCNC: 104 MG/DL (ref 70–110)
GLUCOSE SERPL-MCNC: 112 MG/DL (ref 70–110)
GLUCOSE UR QL STRIP: NEGATIVE
HCO3 UR-SCNC: 21.2 MMOL/L (ref 24–28)
HCO3 UR-SCNC: 22.5 MMOL/L (ref 24–28)
HCO3 UR-SCNC: 22.5 MMOL/L (ref 24–28)
HCO3 UR-SCNC: 23 MMOL/L (ref 24–28)
HCO3 UR-SCNC: 23.5 MMOL/L (ref 24–28)
HCO3 UR-SCNC: 24 MMOL/L (ref 24–28)
HCO3 UR-SCNC: 24.2 MMOL/L (ref 24–28)
HCT VFR BLD AUTO: 24.5 % (ref 40–54)
HCT VFR BLD AUTO: 24.9 % (ref 40–54)
HCT VFR BLD AUTO: 25.2 % (ref 40–54)
HGB BLD-MCNC: 7.7 G/DL (ref 14–18)
HGB BLD-MCNC: 7.8 G/DL (ref 14–18)
HGB BLD-MCNC: 7.8 G/DL (ref 14–18)
HGB UR QL STRIP: ABNORMAL
HYALINE CASTS UR QL AUTO: 7 /LPF
HYPOCHROMIA BLD QL SMEAR: ABNORMAL
IMM GRANULOCYTES # BLD AUTO: 0.65 K/UL (ref 0–0.04)
IMM GRANULOCYTES # BLD AUTO: 0.7 K/UL (ref 0–0.04)
IMM GRANULOCYTES # BLD AUTO: 0.8 K/UL (ref 0–0.04)
IMM GRANULOCYTES NFR BLD AUTO: 3 % (ref 0–0.5)
IMM GRANULOCYTES NFR BLD AUTO: 3 % (ref 0–0.5)
IMM GRANULOCYTES NFR BLD AUTO: 3.6 % (ref 0–0.5)
INR PPP: 1.2 (ref 0.8–1.2)
INR PPP: 1.2 (ref 0.8–1.2)
INR PPP: 1.3 (ref 0.8–1.2)
KETONES UR QL STRIP: NEGATIVE
LDH SERPL L TO P-CCNC: 445 U/L (ref 110–260)
LEUKOCYTE ESTERASE UR QL STRIP: NEGATIVE
LYMPHOCYTES # BLD AUTO: 0.6 K/UL (ref 1–4.8)
LYMPHOCYTES # BLD AUTO: 0.7 K/UL (ref 1–4.8)
LYMPHOCYTES # BLD AUTO: 0.8 K/UL (ref 1–4.8)
LYMPHOCYTES NFR BLD: 2.9 % (ref 18–48)
LYMPHOCYTES NFR BLD: 3.1 % (ref 18–48)
LYMPHOCYTES NFR BLD: 3.5 % (ref 18–48)
MAGNESIUM SERPL-MCNC: 2.3 MG/DL (ref 1.6–2.6)
MAGNESIUM SERPL-MCNC: 2.5 MG/DL (ref 1.6–2.6)
MAGNESIUM SERPL-MCNC: 2.6 MG/DL (ref 1.6–2.6)
MCH RBC QN AUTO: 29.4 PG (ref 27–31)
MCH RBC QN AUTO: 29.5 PG (ref 27–31)
MCH RBC QN AUTO: 29.6 PG (ref 27–31)
MCHC RBC AUTO-ENTMCNC: 30.9 G/DL (ref 32–36)
MCHC RBC AUTO-ENTMCNC: 31 G/DL (ref 32–36)
MCHC RBC AUTO-ENTMCNC: 31.8 G/DL (ref 32–36)
MCV RBC AUTO: 93 FL (ref 82–98)
MCV RBC AUTO: 95 FL (ref 82–98)
MCV RBC AUTO: 96 FL (ref 82–98)
MICROSCOPIC COMMENT: ABNORMAL
MODE: ABNORMAL
MONOCYTES # BLD AUTO: 1.6 K/UL (ref 0.3–1)
MONOCYTES # BLD AUTO: 1.7 K/UL (ref 0.3–1)
MONOCYTES # BLD AUTO: 2 K/UL (ref 0.3–1)
MONOCYTES NFR BLD: 7.1 % (ref 4–15)
MONOCYTES NFR BLD: 7.7 % (ref 4–15)
MONOCYTES NFR BLD: 8.4 % (ref 4–15)
NEUTROPHILS # BLD AUTO: 18.4 K/UL (ref 1.8–7.7)
NEUTROPHILS # BLD AUTO: 19.2 K/UL (ref 1.8–7.7)
NEUTROPHILS # BLD AUTO: 20.2 K/UL (ref 1.8–7.7)
NEUTROPHILS NFR BLD: 85.4 % (ref 38–73)
NEUTROPHILS NFR BLD: 85.7 % (ref 38–73)
NEUTROPHILS NFR BLD: 86.2 % (ref 38–73)
NITRITE UR QL STRIP: NEGATIVE
NRBC BLD-RTO: 0 /100 WBC
PCO2 BLDA: 39.4 MMHG (ref 35–45)
PCO2 BLDA: 41.9 MMHG (ref 35–45)
PCO2 BLDA: 42.8 MMHG (ref 35–45)
PCO2 BLDA: 43.7 MMHG (ref 35–45)
PCO2 BLDA: 43.9 MMHG (ref 35–45)
PCO2 BLDA: 44.4 MMHG (ref 35–45)
PCO2 BLDA: 48.3 MMHG (ref 35–45)
PH SMN: 7.3 [PH] (ref 7.35–7.45)
PH SMN: 7.3 [PH] (ref 7.35–7.45)
PH SMN: 7.32 [PH] (ref 7.35–7.45)
PH SMN: 7.33 [PH] (ref 7.35–7.45)
PH SMN: 7.34 [PH] (ref 7.35–7.45)
PH SMN: 7.36 [PH] (ref 7.35–7.45)
PH SMN: 7.36 [PH] (ref 7.35–7.45)
PH UR STRIP: 5 [PH] (ref 5–8)
PHOSPHATE SERPL-MCNC: 3.5 MG/DL (ref 2.7–4.5)
PHOSPHATE SERPL-MCNC: 3.8 MG/DL (ref 2.7–4.5)
PHOSPHATE SERPL-MCNC: 4.2 MG/DL (ref 2.7–4.5)
PLATELET # BLD AUTO: 462 K/UL (ref 150–450)
PLATELET # BLD AUTO: 462 K/UL (ref 150–450)
PLATELET # BLD AUTO: 488 K/UL (ref 150–450)
PLATELET BLD QL SMEAR: ABNORMAL
PMV BLD AUTO: 11.3 FL (ref 9.2–12.9)
PMV BLD AUTO: 11.5 FL (ref 9.2–12.9)
PMV BLD AUTO: 12.1 FL (ref 9.2–12.9)
PO2 BLDA: 57 MMHG (ref 80–100)
PO2 BLDA: 59 MMHG (ref 80–100)
PO2 BLDA: 59 MMHG (ref 80–100)
PO2 BLDA: 64 MMHG (ref 80–100)
PO2 BLDA: 65 MMHG (ref 80–100)
PO2 BLDA: 75 MMHG (ref 80–100)
PO2 BLDA: 80 MMHG (ref 80–100)
POC BE: -1 MMOL/L
POC BE: -2 MMOL/L
POC BE: -2 MMOL/L
POC BE: -3 MMOL/L
POC BE: -3 MMOL/L
POC BE: -4 MMOL/L
POC BE: -5 MMOL/L
POC SATURATED O2: 87 % (ref 95–100)
POC SATURATED O2: 87 % (ref 95–100)
POC SATURATED O2: 88 % (ref 95–100)
POC SATURATED O2: 91 % (ref 95–100)
POC SATURATED O2: 92 % (ref 95–100)
POC SATURATED O2: 93 % (ref 95–100)
POC SATURATED O2: 95 % (ref 95–100)
POC TCO2: 23 MMOL/L (ref 23–27)
POC TCO2: 24 MMOL/L (ref 23–27)
POC TCO2: 25 MMOL/L (ref 23–27)
POC TCO2: 25 MMOL/L (ref 23–27)
POC TCO2: 26 MMOL/L (ref 23–27)
POLYCHROMASIA BLD QL SMEAR: ABNORMAL
POTASSIUM SERPL-SCNC: 3.7 MMOL/L (ref 3.5–5.1)
POTASSIUM SERPL-SCNC: 4 MMOL/L (ref 3.5–5.1)
POTASSIUM SERPL-SCNC: 4.7 MMOL/L (ref 3.5–5.1)
PROT SERPL-MCNC: 6.7 G/DL (ref 6–8.4)
PROT SERPL-MCNC: 6.7 G/DL (ref 6–8.4)
PROT SERPL-MCNC: 6.9 G/DL (ref 6–8.4)
PROT SERPL-MCNC: 7 G/DL (ref 6–8.4)
PROT UR QL STRIP: ABNORMAL
PROTHROMBIN TIME: 11.9 SEC (ref 9–12.5)
PROTHROMBIN TIME: 12.6 SEC (ref 9–12.5)
PROTHROMBIN TIME: 13.6 SEC (ref 9–12.5)
RBC # BLD AUTO: 2.6 M/UL (ref 4.6–6.2)
RBC # BLD AUTO: 2.64 M/UL (ref 4.6–6.2)
RBC # BLD AUTO: 2.65 M/UL (ref 4.6–6.2)
RBC #/AREA URNS AUTO: >100 /HPF (ref 0–4)
SAMPLE: ABNORMAL
SITE: ABNORMAL
SODIUM SERPL-SCNC: 139 MMOL/L (ref 136–145)
SODIUM SERPL-SCNC: 142 MMOL/L (ref 136–145)
SODIUM SERPL-SCNC: 142 MMOL/L (ref 136–145)
SP GR UR STRIP: 1.01 (ref 1–1.03)
SQUAMOUS #/AREA URNS AUTO: 1 /HPF
T3 SERPL-MCNC: 81 NG/DL (ref 60–180)
T4 FREE SERPL-MCNC: 2.45 NG/DL (ref 0.71–1.51)
TARGETS BLD QL SMEAR: ABNORMAL
TOXIC GRANULES BLD QL SMEAR: PRESENT
TSH SERPL DL<=0.005 MIU/L-ACNC: <0.01 UIU/ML (ref 0.4–4)
URN SPEC COLLECT METH UR: ABNORMAL
WBC # BLD AUTO: 21.5 K/UL (ref 3.9–12.7)
WBC # BLD AUTO: 22.3 K/UL (ref 3.9–12.7)
WBC # BLD AUTO: 23.69 K/UL (ref 3.9–12.7)
WBC #/AREA URNS AUTO: 22 /HPF (ref 0–5)

## 2022-07-27 PROCEDURE — 27000221 HC OXYGEN, UP TO 24 HOURS

## 2022-07-27 PROCEDURE — 85520 HEPARIN ASSAY: CPT | Performed by: THORACIC SURGERY (CARDIOTHORACIC VASCULAR SURGERY)

## 2022-07-27 PROCEDURE — 63600175 PHARM REV CODE 636 W HCPCS: Performed by: STUDENT IN AN ORGANIZED HEALTH CARE EDUCATION/TRAINING PROGRAM

## 2022-07-27 PROCEDURE — 83735 ASSAY OF MAGNESIUM: CPT | Performed by: THORACIC SURGERY (CARDIOTHORACIC VASCULAR SURGERY)

## 2022-07-27 PROCEDURE — 94799 UNLISTED PULMONARY SVC/PX: CPT

## 2022-07-27 PROCEDURE — 86140 C-REACTIVE PROTEIN: CPT | Performed by: STUDENT IN AN ORGANIZED HEALTH CARE EDUCATION/TRAINING PROGRAM

## 2022-07-27 PROCEDURE — 20000000 HC ICU ROOM

## 2022-07-27 PROCEDURE — 85025 COMPLETE CBC W/AUTO DIFF WBC: CPT | Performed by: THORACIC SURGERY (CARDIOTHORACIC VASCULAR SURGERY)

## 2022-07-27 PROCEDURE — 82803 BLOOD GASES ANY COMBINATION: CPT

## 2022-07-27 PROCEDURE — 25000003 PHARM REV CODE 250: Performed by: STUDENT IN AN ORGANIZED HEALTH CARE EDUCATION/TRAINING PROGRAM

## 2022-07-27 PROCEDURE — 94664 DEMO&/EVAL PT USE INHALER: CPT

## 2022-07-27 PROCEDURE — 25000003 PHARM REV CODE 250

## 2022-07-27 PROCEDURE — 85730 THROMBOPLASTIN TIME PARTIAL: CPT | Performed by: THORACIC SURGERY (CARDIOTHORACIC VASCULAR SURGERY)

## 2022-07-27 PROCEDURE — 27000248 HC VAD-ADDITIONAL DAY

## 2022-07-27 PROCEDURE — 97530 THERAPEUTIC ACTIVITIES: CPT

## 2022-07-27 PROCEDURE — 84100 ASSAY OF PHOSPHORUS: CPT | Performed by: THORACIC SURGERY (CARDIOTHORACIC VASCULAR SURGERY)

## 2022-07-27 PROCEDURE — 84480 ASSAY TRIIODOTHYRONINE (T3): CPT | Performed by: INTERNAL MEDICINE

## 2022-07-27 PROCEDURE — 85520 HEPARIN ASSAY: CPT | Mod: 91

## 2022-07-27 PROCEDURE — 27000646 HC AEROBIKA DEVICE

## 2022-07-27 PROCEDURE — 25000003 PHARM REV CODE 250: Performed by: THORACIC SURGERY (CARDIOTHORACIC VASCULAR SURGERY)

## 2022-07-27 PROCEDURE — 99233 SBSQ HOSP IP/OBS HIGH 50: CPT | Mod: ,,, | Performed by: INTERNAL MEDICINE

## 2022-07-27 PROCEDURE — 25000242 PHARM REV CODE 250 ALT 637 W/ HCPCS: Performed by: THORACIC SURGERY (CARDIOTHORACIC VASCULAR SURGERY)

## 2022-07-27 PROCEDURE — 63600175 PHARM REV CODE 636 W HCPCS

## 2022-07-27 PROCEDURE — 99233 PR SUBSEQUENT HOSPITAL CARE,LEVL III: ICD-10-PCS | Mod: ,,, | Performed by: INTERNAL MEDICINE

## 2022-07-27 PROCEDURE — 94660 CPAP INITIATION&MGMT: CPT

## 2022-07-27 PROCEDURE — 84443 ASSAY THYROID STIM HORMONE: CPT | Performed by: INTERNAL MEDICINE

## 2022-07-27 PROCEDURE — 80053 COMPREHEN METABOLIC PANEL: CPT | Mod: 91 | Performed by: THORACIC SURGERY (CARDIOTHORACIC VASCULAR SURGERY)

## 2022-07-27 PROCEDURE — 37799 UNLISTED PX VASCULAR SURGERY: CPT

## 2022-07-27 PROCEDURE — 27000190 HC CPAP FULL FACE MASK W/VALVE

## 2022-07-27 PROCEDURE — 99233 PR SUBSEQUENT HOSPITAL CARE,LEVL III: ICD-10-PCS | Mod: ,,, | Performed by: ANESTHESIOLOGY

## 2022-07-27 PROCEDURE — 93750 INTERROGATION VAD IN PERSON: CPT | Mod: ,,, | Performed by: INTERNAL MEDICINE

## 2022-07-27 PROCEDURE — 25000003 PHARM REV CODE 250: Performed by: PHYSICIAN ASSISTANT

## 2022-07-27 PROCEDURE — 94640 AIRWAY INHALATION TREATMENT: CPT

## 2022-07-27 PROCEDURE — 99233 SBSQ HOSP IP/OBS HIGH 50: CPT | Mod: ,,, | Performed by: ANESTHESIOLOGY

## 2022-07-27 PROCEDURE — 85610 PROTHROMBIN TIME: CPT | Mod: 91 | Performed by: THORACIC SURGERY (CARDIOTHORACIC VASCULAR SURGERY)

## 2022-07-27 PROCEDURE — C9113 INJ PANTOPRAZOLE SODIUM, VIA: HCPCS

## 2022-07-27 PROCEDURE — 63600175 PHARM REV CODE 636 W HCPCS: Performed by: THORACIC SURGERY (CARDIOTHORACIC VASCULAR SURGERY)

## 2022-07-27 PROCEDURE — 99900035 HC TECH TIME PER 15 MIN (STAT)

## 2022-07-27 PROCEDURE — 84439 ASSAY OF FREE THYROXINE: CPT | Performed by: INTERNAL MEDICINE

## 2022-07-27 PROCEDURE — 27100171 HC OXYGEN HIGH FLOW UP TO 24 HOURS

## 2022-07-27 PROCEDURE — 99291 CRITICAL CARE FIRST HOUR: CPT | Mod: 25,,, | Performed by: INTERNAL MEDICINE

## 2022-07-27 PROCEDURE — 81001 URINALYSIS AUTO W/SCOPE: CPT

## 2022-07-27 PROCEDURE — 99291 PR CRITICAL CARE, E/M 30-74 MINUTES: ICD-10-PCS | Mod: 25,,, | Performed by: INTERNAL MEDICINE

## 2022-07-27 PROCEDURE — 83615 LACTATE (LD) (LDH) ENZYME: CPT | Performed by: STUDENT IN AN ORGANIZED HEALTH CARE EDUCATION/TRAINING PROGRAM

## 2022-07-27 PROCEDURE — 87086 URINE CULTURE/COLONY COUNT: CPT

## 2022-07-27 PROCEDURE — 85384 FIBRINOGEN ACTIVITY: CPT | Performed by: THORACIC SURGERY (CARDIOTHORACIC VASCULAR SURGERY)

## 2022-07-27 PROCEDURE — 83880 ASSAY OF NATRIURETIC PEPTIDE: CPT | Performed by: STUDENT IN AN ORGANIZED HEALTH CARE EDUCATION/TRAINING PROGRAM

## 2022-07-27 PROCEDURE — 93750 PR INTERROGATE VENT ASSIST DEV, IN PERSON, W PHYSICIAN ANALYSIS: ICD-10-PCS | Mod: ,,, | Performed by: INTERNAL MEDICINE

## 2022-07-27 PROCEDURE — 80076 HEPATIC FUNCTION PANEL: CPT | Performed by: STUDENT IN AN ORGANIZED HEALTH CARE EDUCATION/TRAINING PROGRAM

## 2022-07-27 PROCEDURE — 94761 N-INVAS EAR/PLS OXIMETRY MLT: CPT

## 2022-07-27 RX ORDER — HYDROXYZINE HYDROCHLORIDE 25 MG/1
25 TABLET, FILM COATED ORAL 3 TIMES DAILY PRN
Status: DISCONTINUED | OUTPATIENT
Start: 2022-07-27 | End: 2022-09-01 | Stop reason: HOSPADM

## 2022-07-27 RX ORDER — FUROSEMIDE 10 MG/ML
10 INJECTION INTRAMUSCULAR; INTRAVENOUS ONCE
Status: COMPLETED | OUTPATIENT
Start: 2022-07-27 | End: 2022-07-27

## 2022-07-27 RX ORDER — METRONIDAZOLE 500 MG/1
500 TABLET ORAL EVERY 8 HOURS
Status: DISCONTINUED | OUTPATIENT
Start: 2022-07-27 | End: 2022-07-27

## 2022-07-27 RX ORDER — BISACODYL 10 MG
10 SUPPOSITORY, RECTAL RECTAL DAILY PRN
Status: DISCONTINUED | OUTPATIENT
Start: 2022-07-27 | End: 2022-09-01 | Stop reason: HOSPADM

## 2022-07-27 RX ORDER — MAGNESIUM SULFATE HEPTAHYDRATE 40 MG/ML
2 INJECTION, SOLUTION INTRAVENOUS ONCE
Status: COMPLETED | OUTPATIENT
Start: 2022-07-27 | End: 2022-07-27

## 2022-07-27 RX ORDER — POTASSIUM CHLORIDE 29.8 MG/ML
40 INJECTION INTRAVENOUS ONCE
Status: COMPLETED | OUTPATIENT
Start: 2022-07-27 | End: 2022-07-27

## 2022-07-27 RX ORDER — METRONIDAZOLE 500 MG/1
500 TABLET ORAL EVERY 8 HOURS
Status: DISCONTINUED | OUTPATIENT
Start: 2022-07-27 | End: 2022-08-09

## 2022-07-27 RX ORDER — SYRING-NEEDL,DISP,INSUL,0.3 ML 29 G X1/2"
148 SYRINGE, EMPTY DISPOSABLE MISCELLANEOUS ONCE
Status: DISCONTINUED | OUTPATIENT
Start: 2022-07-27 | End: 2022-07-29

## 2022-07-27 RX ADMIN — CEFEPIME 2 G: 2 INJECTION, POWDER, FOR SOLUTION INTRAVENOUS at 07:07

## 2022-07-27 RX ADMIN — ACETAMINOPHEN 999.01 MG: 160 SOLUTION ORAL at 05:07

## 2022-07-27 RX ADMIN — WARFARIN SODIUM 4 MG: 4 TABLET ORAL at 04:07

## 2022-07-27 RX ADMIN — MIDODRINE HYDROCHLORIDE 15 MG: 5 TABLET ORAL at 02:07

## 2022-07-27 RX ADMIN — MAGNESIUM SULFATE 2 G: 2 INJECTION INTRAVENOUS at 09:07

## 2022-07-27 RX ADMIN — AMIODARONE HYDROCHLORIDE 400 MG: 200 TABLET ORAL at 09:07

## 2022-07-27 RX ADMIN — MIDODRINE HYDROCHLORIDE 15 MG: 5 TABLET ORAL at 05:07

## 2022-07-27 RX ADMIN — ACETYLCYSTEINE 4 ML: 100 SOLUTION ORAL; RESPIRATORY (INHALATION) at 01:07

## 2022-07-27 RX ADMIN — BISACODYL 10 MG: 10 SUPPOSITORY RECTAL at 05:07

## 2022-07-27 RX ADMIN — GUAIFENESIN 300 MG: 100 SOLUTION ORAL at 05:07

## 2022-07-27 RX ADMIN — ALBUTEROL SULFATE 2.5 MG: 2.5 SOLUTION RESPIRATORY (INHALATION) at 07:07

## 2022-07-27 RX ADMIN — HEPARIN SODIUM 2400 UNITS/HR: 5000 INJECTION INTRAVENOUS; SUBCUTANEOUS at 06:07

## 2022-07-27 RX ADMIN — HYDROXYZINE HYDROCHLORIDE 25 MG: 25 TABLET ORAL at 04:07

## 2022-07-27 RX ADMIN — ALBUTEROL SULFATE 2.5 MG: 2.5 SOLUTION RESPIRATORY (INHALATION) at 01:07

## 2022-07-27 RX ADMIN — ACETYLCYSTEINE 4 ML: 100 SOLUTION ORAL; RESPIRATORY (INHALATION) at 07:07

## 2022-07-27 RX ADMIN — ACETAMINOPHEN 999.01 MG: 160 SOLUTION ORAL at 04:07

## 2022-07-27 RX ADMIN — PANTOPRAZOLE SODIUM 40 MG: 40 INJECTION, POWDER, FOR SOLUTION INTRAVENOUS at 09:07

## 2022-07-27 RX ADMIN — DEXMEDETOMIDINE HYDROCHLORIDE 0.4 MCG/KG/HR: 4 INJECTION INTRAVENOUS at 10:07

## 2022-07-27 RX ADMIN — DEXMEDETOMIDINE HYDROCHLORIDE 0.2 MCG/KG/HR: 4 INJECTION INTRAVENOUS at 11:07

## 2022-07-27 RX ADMIN — ASPIRIN 81 MG CHEWABLE TABLET 81 MG: 81 TABLET CHEWABLE at 09:07

## 2022-07-27 RX ADMIN — ALBUTEROL SULFATE 2.5 MG: 2.5 SOLUTION RESPIRATORY (INHALATION) at 09:07

## 2022-07-27 RX ADMIN — FUROSEMIDE 10 MG: 10 INJECTION, SOLUTION INTRAMUSCULAR; INTRAVENOUS at 09:07

## 2022-07-27 RX ADMIN — FUROSEMIDE 10 MG/HR: 10 INJECTION, SOLUTION INTRAMUSCULAR; INTRAVENOUS at 03:07

## 2022-07-27 RX ADMIN — ACETYLCYSTEINE 4 ML: 100 SOLUTION ORAL; RESPIRATORY (INHALATION) at 09:07

## 2022-07-27 RX ADMIN — GUAIFENESIN 300 MG: 100 SOLUTION ORAL at 01:07

## 2022-07-27 RX ADMIN — AMIODARONE HYDROCHLORIDE 0.5 MG/MIN: 1.8 INJECTION, SOLUTION INTRAVENOUS at 10:07

## 2022-07-27 RX ADMIN — HYDROXYZINE HYDROCHLORIDE 25 MG: 25 TABLET ORAL at 09:07

## 2022-07-27 RX ADMIN — HYDROMORPHONE HYDROCHLORIDE 1 MG: 1 INJECTION, SOLUTION INTRAMUSCULAR; INTRAVENOUS; SUBCUTANEOUS at 07:07

## 2022-07-27 RX ADMIN — METRONIDAZOLE 500 MG: 500 TABLET ORAL at 11:07

## 2022-07-27 RX ADMIN — LEVOTHYROXINE SODIUM 112 MCG: 112 TABLET ORAL at 05:07

## 2022-07-27 RX ADMIN — DEXMEDETOMIDINE HYDROCHLORIDE 0.6 MCG/KG/HR: 4 INJECTION INTRAVENOUS at 01:07

## 2022-07-27 RX ADMIN — FUROSEMIDE 10 MG: 10 INJECTION, SOLUTION INTRAMUSCULAR; INTRAVENOUS at 05:07

## 2022-07-27 RX ADMIN — METRONIDAZOLE 500 MG: 500 TABLET ORAL at 09:07

## 2022-07-27 RX ADMIN — NOREPINEPHRINE BITARTRATE 0.02 MCG/KG/MIN: 8 INJECTION, SOLUTION INTRAVENOUS at 06:07

## 2022-07-27 RX ADMIN — POLYETHYLENE GLYCOL 3350 17 G: 17 POWDER, FOR SOLUTION ORAL at 09:07

## 2022-07-27 RX ADMIN — CEFEPIME 2 G: 2 INJECTION, POWDER, FOR SOLUTION INTRAVENOUS at 08:07

## 2022-07-27 RX ADMIN — MIDODRINE HYDROCHLORIDE 15 MG: 5 TABLET ORAL at 09:07

## 2022-07-27 RX ADMIN — POTASSIUM CHLORIDE 40 MEQ: 29.8 INJECTION, SOLUTION INTRAVENOUS at 09:07

## 2022-07-27 RX ADMIN — VASOPRESSIN 0.04 UNITS/MIN: 20 INJECTION INTRAVENOUS at 11:07

## 2022-07-27 RX ADMIN — HYDROMORPHONE HYDROCHLORIDE 0.5 MG: 1 INJECTION, SOLUTION INTRAMUSCULAR; INTRAVENOUS; SUBCUTANEOUS at 04:07

## 2022-07-27 NOTE — SUBJECTIVE & OBJECTIVE
Interval History/Significant Events: Weaned off pressors yesterday and then extubated in the afternoon. Increased oxygen requirements overnight requiring comfort flow. Monitoring oxygenation with ABG due to inability to get sat probe to work. Continues to be intermittently febrile. Cultures drawn yesterday.      Follow-up For: Procedure(s) (LRB):  REMOVAL, FOREIGN BODY (N/A)    Post-Operative Day: 5 Days Post-Op    Objective:     Vital Signs (Most Recent):  Temp: (!) 100.76 °F (38.2 °C) (07/27/22 0915)  Pulse: 79 (07/27/22 0915)  Resp: (!) 28 (07/27/22 0735)  BP: (!) 84/0 (07/27/22 0730)  SpO2: (!) 88 % (07/27/22 0300)   Vital Signs (24h Range):  Temp:  [98 °F (36.7 °C)-101.66 °F (38.7 °C)] 100.76 °F (38.2 °C)  Pulse:  [] 79  Resp:  [20-30] 28  SpO2:  [49 %-95 %] 88 %  BP: (78-88)/(0) 84/0  Arterial Line BP: ()/(58-86) 87/76     Weight: 101.2 kg (223 lb)  Body mass index is 29.42 kg/m².      Intake/Output Summary (Last 24 hours) at 7/27/2022 0921  Last data filed at 7/27/2022 0900  Gross per 24 hour   Intake 2557.57 ml   Output 2365 ml   Net 192.57 ml       Physical Exam  Constitutional:       General: He is not in acute distress.  HENT:      Head: Normocephalic and atraumatic.      Nose:      Comments: NG in place  Eyes:      Pupils: Pupils are equal, round, and reactive to light.   Neck:      Comments: R IJ CVC    Cardiovascular:      Rate and Rhythm: Normal rate. Rhythm irregular.      Comments: LVAD in place -- 6400 rpm, 5.0L    Midline sternotomy c/d with dressing in place      Pulmonary:      Comments: On comfort flow. Persistent tachypnea.    Very weak voice  Abdominal:      General: There is no distension.      Palpations: Abdomen is soft.      Comments: Prior driveline site packed with iodoform gauze   Genitourinary:     Comments: Lagunas in place draining clear urine  Musculoskeletal:      Comments: ECMO cannula sites with dressing in place.     Cutdown incision with seroma with wound vac in  place. Minimal output    right radial arterial line   Skin:     General: Skin is warm and dry.   Neurological:      General: No focal deficit present.      Mental Status: He is alert.      Comments: Responding to questions with head nods, following commands       Vents:  Vent Mode: Spont (07/26/22 1106)  Ventilator Initiated: Yes (07/15/22 1027)  Set Rate: 16 BPM (07/26/22 0732)  Vt Set: 450 mL (07/26/22 0732)  Pressure Support: 10 cmH20 (07/26/22 1106)  PEEP/CPAP: 5 cmH20 (07/26/22 1106)  Oxygen Concentration (%): 80 (07/27/22 0735)  Peak Airway Pressure: 16 cmH2O (07/26/22 1106)  Plateau Pressure: 21 cmH20 (07/26/22 1106)  Total Ve: 13.8 mL (07/26/22 1106)  Negative Inspiratory Force (cm H2O): -27 (07/26/22 1222)  F/VT Ratio<105 (RSBI): (!) 59.09 (07/26/22 0706)    Lines/Drains/Airways       Central Venous Catheter Line  Duration             Percutaneous Central Line Insertion/Assessment - Quad Lumen  07/21/22 1515 right internal jugular 5 days              Drain  Duration                  Urethral Catheter 07/13/22 0848 Temperature probe;Latex 14 Fr. 14 days         NG/OG Tube 07/15/22 1030 Left nostril 11 days              Arterial Line  Duration             Arterial Line 07/13/22 2000 Right Radial 13 days              Line  Duration                  VAD 07/13/22 1300 Left ventricular assist device HeartMate 3 13 days              Peripheral Intravenous Line  Duration                  Midline Catheter Insertion/Assessment  - Single Lumen 07/21/22 1616 Left basilic vein (medial side of arm) 18g x 10cm 5 days                    Significant Labs:    CBC/Anemia Profile:  Recent Labs   Lab 07/26/22  1145 07/26/22  1306 07/26/22  1749 07/26/22  1950 07/27/22  0348   WBC 18.18*  --   --  22.84* 22.30*   HGB 7.6*  --   --  7.8* 7.7*   HCT 24.1*   < > 25* 25.2* 24.9*     --   --  472* 462*   MCV 94  --   --  96 96   RDW 20.1*  --   --  20.8* 21.2*    < > = values in this interval not displayed.         Chemistries:  Recent Labs   Lab 07/26/22  1145 07/26/22  1950 07/27/22  0348    140 142   K 4.1 3.9 4.7    108 108   CO2 22* 23 22*   BUN 33* 32* 35*   CREATININE 1.4 1.4 1.5*   CALCIUM 9.1 9.0 9.2   ALBUMIN 2.2* 2.2* 2.2*  2.2*   PROT 6.6 6.6 6.7  6.7   BILITOT 10.7* 10.1* 9.4*  9.4*   ALKPHOS 145* 142* 137*  137*   ALT 43 48* 48*  48*   AST 89* 81* 78*  78*   MG 2.5 2.3 2.6   PHOS 2.3* 3.1 3.5       ABGs:   Recent Labs   Lab 07/27/22  0914   PH 7.328*   PCO2 43.9   HCO3 23.0*   POCSATURATED 95   BE -3     Coagulation:   Recent Labs   Lab 07/27/22  0348   INR 1.2   APTT 36.5*     Lactic Acid: No results for input(s): LACTATE in the last 48 hours.  All pertinent labs within the past 24 hours have been reviewed.    Significant Imaging:  I have reviewed all pertinent imaging results/findings within the past 24 hours.

## 2022-07-27 NOTE — PLAN OF CARE
Problem: Physical Therapy  Goal: Physical Therapy Goal  Description: Goals to be met by: 22    Patient will increase functional independence with mobility by performin. Supine to sit with MInimal Assistance -not met  2. Sit to stand transfer with Contact Guard Assistance -not met  3. Gait  x 150 feet with Contact Guard Assistance with approved assistive device -not met  4. Ascend/descend 8 stair with right Handrails Contact Guard Assistance -not met  5. Sitting at edge of bed x15 minutes with Contact Guard Assistance to improve tolerance to activities. -not met  6. Lower extremity exercise program x15 reps with assistance as needed -not met    Outcome: Ongoing, Progressing   Goals remain appropriate. 2022

## 2022-07-27 NOTE — PLAN OF CARE
"      SICU PLAN OF CARE NOTE    Dx: Left ventricular assist device (LVAD) complication    Shift Events: Placed on Comfort flow 35L/70% @0315    Goals of Care: MAP >70     Neuro: AAO x4, Arouses to Voice, Follows Commands, and Moves All Extremities    Vital Signs: BP (!) 82/0 (BP Location: Right arm, Patient Position: Lying)   Pulse 81   Temp 100.22 °F (37.9 °C) (Core Bladder)   Resp (!) 25   Ht 6' 1" (1.854 m)   Wt 101.2 kg (223 lb)   SpO2 (!) 88%   BMI 29.42 kg/m²     Cardiac: A Fib    Respiratory: Comfort Isiah 40L/80%    Diet: NPO    Gtts: Precedex @ .6, Vasopressin @ .04, Amiodarone @.5, Lidocaine @.5, Lasix @2, and Heparin @2400    Urine Output: Urinary Catheter 575 cc/shift    Wound Vac: 50cc/ shift      Labs/Accuchecks: q4 accu, q6, ABG, q8 labs.    Skin: Bed plugged in with heel and sacral foams in place.Incontience pad in use. Hydrofera blue in place to reduce moisture at driveline site. Dressings at prior chest tube sites changed throughout shift per saturation.       "

## 2022-07-27 NOTE — PT/OT/SLP PROGRESS
Speech Language Pathology      Tim Richards  MRN: [id number]    Orders received and chart reviewed. SLP to bedside and RN requesting hold at this time given tenuous respiratory status. SLP will return 07/28/22 and reassess as appropriate.     Ayesha Vora MS, CCC-SLP  Speech Language Pathologist  Pager: [phone]  Date 7/27/2022

## 2022-07-27 NOTE — PROGRESS NOTES
[facility] - Surgical Intensive Care  Heart Transplant  Progress Note     Patient Name: Tim Richards  MRN: [id number]  Admission Date: 6/27/2022  Hospital Length of Stay: 30 days  Attending Physician: Yg Kaufman MD  Primary Care Provider: Deyanira Booth MD  Principal Problem:Left ventricular assist device (LVAD) complication    Subjective:   Overnight events:   - Pt extubated yesterday  - Was transfused 1U PRBC yesterday.   - CXR yesterday showed pulmonary edema.   - Pt continues to spike fevers, tmax 101.6.   - Blood and sputum cultures negative to date.   - As for pressors, he remains on vasopressin gtt. Levophed gtt was started this AM.   - Lasix gtt dose was increased to 10 mg/hr.   - Thyroid function test revealed hyperthyroidism.      Intake/Output - Last 3 Shifts       07/25 0700 07/26 0659 07/26 0700 07/27 0659 07/27 0700 07/28 0659    I.V. (mL/kg) 2330.6 (23.1) 1707.7 (16.9) 390.5 (3.9)    Blood  0     NG/ 640 150    IV Piggyback 621.6 284.4 49.6    Total Intake(mL/kg) 3392.2 (33.7) 2632.2 (26) 590.1 (5.8)    Urine (mL/kg/hr) 2440 (1) 2315 (1) 905 (1.8)    Other 50 100     Total Output 2490 2415 905    Net +902.2 +217.2 -314.9                 Medications:   Continuous Infusions:   amiodarone in dextrose 5% 0.5 mg/min (07/27/22 1100)    angiotensin II Stopped (07/26/22 0757)    dexmedetomidine (PRECEDEX) infusion 0.6 mcg/kg/hr (07/27/22 1100)    EPINEPHrine Stopped (07/26/22 1018)    furosemide (LASIX) 2 mg/mL continuous infusion (non-titrating) 10 mg/hr (07/27/22 1100)    heparin (porcine) in 5 % dex 2,400 Units/hr (07/27/22 1100)    LIDOcaine 0.5 mg/min (07/27/22 1100)    NORepinephrine bitartrate-D5W 0.02 mcg/kg/min (07/27/22 1100)    vasopressin 0.04 Units/min (07/27/22 1100)       Scheduled Meds:   acetylcysteine 100 mg/ml (10%)  4 mL Nebulization Q6H    amiodarone  400 mg Oral BID    aspirin  81 mg Per OG tube Daily    ceFEPime (MAXIPIME) IVPB  2 g Intravenous  Q12H    guaiFENesin 100 mg/5 ml  300 mg Per NG tube Q6H    levothyroxine  112 mcg Per OG tube Before breakfast    magnesium citrate  148 mL Oral Once    metroNIDAZOLE  500 mg Oral Q8H    midodrine  15 mg Per NG tube Q8H    pantoprazole  40 mg Intravenous BID    polyethylene glycol  17 g Per NG tube Daily    warfarin  4 mg Oral Daily     PRN Meds:sodium chloride, sodium chloride 0.9%, sodium chloride 0.9%, acetaminophen, albuterol sulfate, dextrose 10%, dextrose 10%, HYDROmorphone, HYDROmorphone  Objective:     Vitals:  Temp:  [98 °F (36.7 °C)-101.66 °F (38.7 °C)]   Pulse:  []   Resp:  [20-30]   BP: (78-88)/(0)   SpO2:  [49 %-96 %]   Arterial Line BP: ()/(58-86)   I/O's:    Intake/Output Summary (Last 24 hours) at 7/27/2022 1150  Last data filed at 7/27/2022 1100  Gross per 24 hour   Intake 2573.9 ml   Output 2665 ml   Net -91.1 ml       Constitutional: No distress, obese, conversant  Constitutional:       General: He is not in acute distress.     Appearance: He is obese. He is ill-appearing.   HENT:      Head: Normocephalic.      Nose: Nose normal.      Mouth/Throat:      Mouth: Mucous membranes are moist.   Eyes:      General: Scleral icterus present.   Cardiovascular:      Rate and Rhythm: Normal rate and regular rhythm.      Comments: VAD hum appreciated  Pulses detected via doppler  Pulmonary:      Comments: Bilateral basal crackles  Abdominal:      General: Bowel sounds are normal.      Palpations: Abdomen is soft.   Musculoskeletal:      Cervical back: Neck supple.      Right lower leg: + edema.      Left lower leg: + edema.   Skin:     General: Skin is warm.   Neurological:      General: No focal deficit present.     Labs:     Lab Results   Component Value Date    HGBA1C 5.4 04/26/2021     Lab Results   Component Value Date    BNP 3,441 (H) 07/27/2022    BNP 2,301 (H) 07/25/2022    BNP 2,579 (H) 07/22/2022     Lab Results   Component Value Date    CHOL 130 05/19/2022    HDL 46 05/19/2022     LDLCALC 54.8 (L) 05/19/2022    TRIG 43 07/14/2022         Micro:   Blood Cultures  Lab Results   Component Value Date    LABBLOO No Growth to date 07/26/2022    LABBLOO No Growth to date 07/26/2022    LABBLOO No growth after 5 days. 07/22/2022    LABBLOO No growth after 5 days. 07/22/2022    LABBLOO No growth after 5 days. 07/20/2022     Urine Cultures  Lab Results   Component Value Date    LABURIN No growth 03/23/2018    LABURIN No growth 03/15/2018     Sputum Cultures    Imaging:     EF   Date Value Ref Range Status   07/21/2022 10 % Final   07/08/2022 13 % Final   07/01/2022 10 % Final   06/28/2022 10 % Final   05/19/2022 10 % Final       TTE: {Echo:   EF   Date Value Ref Range Status   07/21/2022 10 % Final   07/08/2022 13 % Final   07/01/2022 10 % Final           Assessment/Plan     57 Y/O M with Hx of stage D HFrEF (EF 10%) due to NICM underwent HM2 implant 3/8/18 and exchange of outflow graft 3/9/18, Hx VT/VF s/p SICD 2014 presenting with gradual worsening shortness of breath associated with nonpleuritic, nonradiating bilateral 4/10 retrosternal chest pressure pain.  Symptoms began yesterday and have gradually worsened in the last 12 hours.  He does report going to a family picnic with increased consumption of chicken wings, ribs and other salty meat products.  Prior to symptom onset, he reports nausea, nonbloody diarrhea began yesterday and single episode of nonbloody nonbilious vomiting this morning. He also complains of dizziness, lightheadedness, and a mild HA. SOB worsened this morning prompting him to come to the ED.      In the ED, patient was in respiratory distress requiring BiPAP. MAP 96 and started on Nitro gtt. Work-up revealed WBC 10, K 5.4, Cr 2.5 (baseline 1.9), BNP 1950 (baseline 200-300s), Bili 2.1, LDH 1058, and INR 3.2. Bedside TTE with severely reduced EF, AV not opening, septum bulging into RV. Given IV Lasix 80 mg x1 with only 100-200 mL UOP and dark in color. HM2 interrogated and  flows were 8.5-9 L/min with no alarms or power surges. Cardiology consulted in the ED, dicussed with HTS, and decision made to admit to ICU for further mgmt.       * Left ventricular assist device (LVAD) complication  The patient presented with COVID and found to have an uptrending LDH with increased power.  He underwent HM III upgrade on 7/13/22  -Daily LDH. Gently trending up.   -Continue Heparin drip     Fevers:       - Source unclear. Sputum with K aerogenes. DDx includes infection around driveline.       - Blood cultures, sputum cultures obtained. Negative to date.         - Currently on antibiotics (cefepime). ID following.        - Consider CT abdomen to rule out fluid collection around driveline.     Chronic combined systolic and diastolic heart failure  ADHF  Underwent HM III upgrade on 7/13/22  Currently on Vasopressin and levophed - hypotension could be a combination of cardiogenic and septic shock. Off Epi gtt, Angiotensin II gtt.  Still on Precedex gtt  Pt is net +9L since admission. BNP elevated 3441.   Continue Lasix gtt. Rate was increased to 10/hr yesterday. Continue the same. Will monitor fluid status closely.    Being treated for sepsis  Chest tube removal, bronch, and old driveline removed 7/22    History of ventricular tachycardia  Patient experienced an episode of VT on 6/30 and experienced an ICD shock thought to be related to hypokalemia (K of 3.1 on 6/30)  - Aggressively replete K, keep K>4 and Mg>2  - Continue lidocaine gtt, and amiodarone gtt and PO amiodarone.   - EP signed off and recommending Amiodarone and Mexilitine when pt can safely take PO.   - Would try to wean Lidocaine gtt if able  - Continue to monitor on telemetry     COVID-19 - resolved  -Previously hypoxic then recovered  -ID was consulted, recommended Remdesivir, course completed     Elevated serum lactate dehydrogenase (LDH)  S/p HM3 exchange for HM2 pump thrombosis  Trend LDH daily     Amiodarone induced  hypothyrodism  -Continue levothyroxine 112 mcg   - Thyroid function test reveals hyperthyroidism. Recommend consulting Endocrinology.      CKD (chronic kidney disease), stage III  WAGNER on CKD  Patient's baseline is 1.5-2.0  - Nephrology is following  - Avoiding nephrotoxic medications  - Continue to monitor  - Strict I/O's         Signed:  Oliverio Soriano MD, MSCI  [employer] - Cardiology Fellow (visiting)  Heart transplant Service

## 2022-07-27 NOTE — PT/OT/SLP PROGRESS
Physical Therapy Treatment    Patient Name:  Tim Richards   MRN:  [id number]    Recommendations:     Discharge Recommendations:  home health PT   Discharge Equipment Recommendations:  (will determine DME needs closer to discharge)   Barriers to discharge: Decreased caregiver support family will not be able to assist at current functional level.     Assessment:     Tim Richards is a 56 y.o. male admitted with a medical diagnosis of Left ventricular assist device (LVAD) complication.  He presents with the following impairments/functional limitations:  weakness, impaired endurance, impaired functional mobility, gait instability, impaired balance, decreased safety awareness, decreased lower extremity function pt tolerated treatment well being able to begin standing. Pt will benefit from cont skilled PT 3x/wk to progress physically. Pt will be able to discharge home with HHPT when medically stable.     Rehab Prognosis: Good; patient would benefit from acute skilled PT services to address these deficits and reach maximum level of function.    Recent Surgery: Procedure(s) (LRB):  REMOVAL, FOREIGN BODY (N/A) 5 Days Post-Op    Plan:     During this hospitalization, patient to be seen 3 x/week to address the identified rehab impairments via gait training, therapeutic activities, therapeutic exercises, neuromuscular re-education and progress toward the following goals:    · Plan of Care Expires:  08/20/22    Subjective     Chief Complaint: pt c/o being tired after treatment.   Patient/Family Comments/goals: to get better and go home.   Pain/Comfort:  · Pain Rating 1: 0/10  · Pain Rating Post-Intervention 1: 0/10      Objective:     Communicated with nurse prior to session.  Patient found supine with telemetry, arterial line, LVAD, central line, wound vac, sun catheter (comfort flow and non-rebreather), NG Tube  upon PT entry to room.     General Precautions: Standard, fall, sternal, LVAD   Orthopedic Precautions:N/A    Braces:    Respiratory Status: Comfort flow, flow 40 L/min, concentration 78% RN applied NRB during treatment to supplement O2 needs.      Functional Mobility:  · Bed Mobility:  Pt needed verbal cues for functional mobility with sternal precautions.    · Rolling Right: moderate assistance  · Supine to Sit: moderate assistance  · Sit to Supine: maximal assistance  ·   · Transfers:     · Sit to Stand:  minimum assistance and of 2 persons with hand-held assist. Pt was not able to come to upright posture with standing and had B knees and hips flexed.   ·   · Balance: pt sat on EOB x 5 min with CGA to min assist. Pt leaned backwards with sitting and needed tactile cues at the shoulders and verbal cues to shift center of gravity over base of support. Pt needed verbal cues to hold head upright with sitting.       AM-PAC 6 CLICK MOBILITY  Turning over in bed (including adjusting bedclothes, sheets and blankets)?: 2  Sitting down on and standing up from a chair with arms (e.g., wheelchair, bedside commode, etc.): 2  Moving from lying on back to sitting on the side of the bed?: 2  Moving to and from a bed to a chair (including a wheelchair)?: 1  Need to walk in hospital room?: 1  Climbing 3-5 steps with a railing?: 1  Basic Mobility Total Score: 9       Therapeutic Activities and Exercises:   pt received verbal instructions in PT POC and verbally expressed understanding of such.     Patient left supine with all lines intact, call button in reach and RN present..    GOALS:   Multidisciplinary Problems     Physical Therapy Goals        Problem: Physical Therapy    Goal Priority Disciplines Outcome Goal Variances Interventions   Physical Therapy Goal     PT, PT/OT Ongoing, Progressing     Description: Goals to be met by: 22    Patient will increase functional independence with mobility by performin. Supine to sit with MInimal Assistance -not met  2. Sit to stand transfer with Contact Guard Assistance -not met  3.  Gait  x 150 feet with Contact Guard Assistance with approved assistive device -not met  4. Ascend/descend 8 stair with right Handrails Contact Guard Assistance -not met  5. Sitting at edge of bed x15 minutes with Contact Guard Assistance to improve tolerance to activities. -not met  6. Lower extremity exercise program x15 reps with assistance as needed -not met               Multidisciplinary Problems (Resolved)        Problem: Physical Therapy    Goal Priority Disciplines Outcome Goal Variances Interventions   Physical Therapy Goal   (Resolved)     PT, PT/OT Met     Description: The patient is near his functional baseline, he ambulated within the room with no AD and independence. Unable to do stair training, assess gait endurance due to COVID restrictions. He is safe to return home with no therapy needs. He is in agreement with PT discharge, he states he is confident he can ascend/descend his stairs.           Problem: Physical Therapy    Goal Priority Disciplines Outcome Goal Variances Interventions   Physical Therapy Goal   (Resolved)     PT, PT/OT Met                     Time Tracking:     PT Received On: 07/27/22  PT Start Time: 1347     PT Stop Time: 1403  PT Total Time (min): 16 min     Billable Minutes: Therapeutic Activity 16 min    Treatment Type: Treatment  PT/PTA: PT     PTA Visit Number: 0     07/27/2022

## 2022-07-27 NOTE — PROGRESS NOTES
07/27/2022  Fitz Christianson    Current provider:  Yg Kaufman MD    Device interrogation:  TXP LVAD INTERROGATIONS 7/27/2022 7/27/2022 7/27/2022 7/27/2022 7/27/2022 7/27/2022 7/27/2022   Type HeartMate3 HeartMate3 HeartMate3 HeartMate3 HeartMate3 HeartMate3 HeartMate3   Flow 5.7 5.7 5.8 5.7 5.6 5.6 5.6   Speed 6400 6400 6400 6400 6400 6400 6400   PI 2.7 2.5 2.3 2.1 2.8 2.7 3   Power (Jackson) 5.6 5.5 5.5 5.5 5.5 5.5 5.5   LSL - - - - 6000 6000 6000   Low Flow Alarm - - - - - - -   Pulsatility - - - - - - -          Rounded on Tim Richards to ensure all mechanical assist device settings (IABP or VAD) were appropriate and all parameters were within limits.  I was able to ensure all back up equipment was present, the staff had no issues, and the Perfusion Department daily rounding was complete.      For implantable VADs: Interrogation of Ventricular assist device was performed with analysis of device parameters and review of device function. I have personally reviewed the interrogation findings and agree with findings as stated.     In emergency, the nursing units have been notified to contact the perfusion department either by:  Calling [phone] from 630am to 4pm Mon thru Fri, utilizing the On-Call Finder functionality of Epic and searching for Perfusion, or by contacting the hospital  from 4pm to 630am and on weekends and asking to speak with the perfusionist on call.    1:41 PM

## 2022-07-27 NOTE — SUBJECTIVE & OBJECTIVE
Interval History: ". Patient remains intubated and febrile. BAL culture on 7/20 now with K aerogenes. Blood cultures remain NGTD. Yellow mucoid drainage noted at DLES. Brown yellow secretions at prior chest tube exit site. Transitioned from meropenem to cefepime on 7/25.     Extubated on 7/26. Febrile to 101.6 F. Repeat sputum culture with GNR pending identification.     Review of Systems   Unable to perform ROS: Severe respiratory distress   Objective:     Vital Signs (Most Recent):  Temp: 99.86 °F (37.7 °C) (07/27/22 1130)  Pulse: 94 (07/27/22 1132)  Resp: (!) 24 (07/27/22 1132)  BP: (!) 86/0 (07/27/22 1124)  SpO2: 96 % (07/27/22 1132)   Vital Signs (24h Range):  Temp:  [98 °F (36.7 °C)-101.66 °F (38.7 °C)] 99.86 °F (37.7 °C)  Pulse:  [] 94  Resp:  [20-30] 24  SpO2:  [49 %-96 %] 96 %  BP: (78-88)/(0) 86/0  Arterial Line BP: ()/(58-86) 82/70     Weight: 101.2 kg (223 lb)  Body mass index is 29.42 kg/m².    Estimated Creatinine Clearance: 69.6 mL/min (A) (based on SCr of 1.5 mg/dL (H)).    Physical Exam  Vitals and nursing note reviewed. Exam conducted with a chaperone present.   Constitutional:       General: He is sleeping.      Appearance: He is ill-appearing.   HENT:      Head: Normocephalic and atraumatic.   Eyes:      General: Scleral icterus present.         Right eye: No discharge.         Left eye: No discharge.      Conjunctiva/sclera: Conjunctivae normal.   Cardiovascular:      Pulses: Normal pulses.      Comments: Distant VAD Humming sounds  Pulmonary:      Effort: Tachypnea and accessory muscle usage present. No respiratory distress.      Breath sounds: No stridor. Rhonchi present. No wheezing or rales.   Abdominal:      General: Bowel sounds are decreased. There is no distension.      Palpations: Abdomen is soft.      Tenderness: There is no abdominal tenderness.          Comments: Left sided DLES covered with gauze dressing. Prior chest tube sites covered with gauze dressing.      Musculoskeletal:         General: Normal range of motion.   Skin:     General: Skin is warm and dry.   Neurological:      General: No focal deficit present.      Mental Status: He is easily aroused.      Comments: Awakens with verbal stimuli. Shakes head for yes/no questions.        Significant Labs: Blood Culture:   Recent Labs   Lab 07/20/22  1450 07/22/22  0857 07/22/22  0918 07/26/22  1200 07/26/22  1207   LABBLOO No growth after 5 days. No growth after 5 days. No growth after 5 days. No Growth to date No Growth to date       BMP:   Recent Labs   Lab 07/27/22  0348         K 4.7      CO2 22*   BUN 35*   CREATININE 1.5*   CALCIUM 9.2   MG 2.6       CBC:   Recent Labs   Lab 07/26/22  1145 07/26/22  1306 07/26/22  1749 07/26/22  1950 07/27/22  0348   WBC 18.18*  --   --  22.84* 22.30*   HGB 7.6*  --   --  7.8* 7.7*   HCT 24.1*   < > 25* 25.2* 24.9*     --   --  472* 462*    < > = values in this interval not displayed.       Respiratory Culture:   Recent Labs   Lab 07/20/22  1120 07/26/22  1010   GSRESP Few WBC's  Rare Gram positive cocci  Rare Gram negative rods <10 epithelial cells per low power field.  Rare WBC's  No organisms seen   RESPIRATORYC  --  GRAM NEGATIVE LAURA  Rare  Identification and susceptibility pending  *       Urine Culture: No results for input(s): LABURIN in the last 4320 hours.  Urine Studies:   Recent Labs   Lab 06/28/22  1137   COLORU Yellow   APPEARANCEUA Clear   PHUR 5.0   SPECGRAV 1.010   PROTEINUA Negative   GLUCUA Negative   KETONESU Negative   BILIRUBINUA Negative   OCCULTUA 2+*   NITRITE Negative   LEUKOCYTESUR Negative   RBCUA 0   WBCUA 2   SQUAMEPITHEL 0   HYALINECASTS 4*       Wound Culture:   Recent Labs   Lab 07/25/22  1503   LABAERO No growth       Significant Imaging: I have reviewed all pertinent imaging results/findings within the past 24 hours.

## 2022-07-27 NOTE — ASSESSMENT & PLAN NOTE
Tim Richards is a 56 y.o. male HFrEF with an EF of 10% and a history of HM2 LVAD in 2018 who is now s/p HM3 upgrade, initiation of V-A ECMO after failure to wean off bypass x2      Neuro/Psych:   -- Sedation: Precedex.  -- Pain: PRN Dilaudid             Cards:   -- S/P LVAD exchange on 7/14/22  --Improving pressor requirements, wean gwen for MAP > 75, keep other pressors the same   Epi 0.02   Levo off   Vaso 0.04   Giapreza 5  -- Continue Midodrine 15 mg Q8  -- Stress dose steroids complete  -- MAP goal 75  -- VAD  flows stable  -- Decannulated on 7/19  -- Sternal closure on 7/15  -- Bedside drive line removal on 7/23  -- EP consult due to v-tach  -- Continue lidocaine, continue amio ggt, will consider restarting mexiletine      Pulm:   -- Goal O2 sat > 90%  -- ABG PRN  -- Chest tubes removed  -- Nitric weaned off  -- Extubated 7/26, now on comfort flow, may need reintubation today  -- Trach consult; tentatively planning for Thursday if patient needs reintubation  -- Monitor O2 sat with ABGs      Renal:  -- Keep sun for strict I/O  -- Trend BUN/Cr  -- Lasix ggt 1/hr      FEN / GI:   -- Replace lytes as needed  -- Nutrition: NPO, holding TF for now due to respiratory status  -- GI ppx: PPI  -- Bowel reg: miralax, docusate, mag citrate      ID:   -- Tm: febrile; WBC increasing 22.3 from 18.1  -- ABX cefepime  -- Cultures sent, repeat cultures sent 7/22, no growth to date, repeat cultures sent 7/26  -- BAL GNR --> pansensitive klebsiella   -- ID consult   -- daily vanc levels  -- Line exchange 7/21  -- consider CT scan due to ongoing intermittent fevers and increasing WBC      Heme/Onc:   -- H/H stable   -- Daily CBC  -- Received 18 pRBCs, 16 FFP, 2 plt, 25 cryo; 1500 albumin intra-op  -- Heparin gtt at 2200, goal anti Xa 0.3  -- Warfarin 4mg      Endo:   -- BG goal 140-180  -- SSI  -- Levothyroxine      PPx:   Feeding: NPO, TF  Analgesia/Sedation: Precedex / PRN Dilaudid  Thromboembolic prevention:  SCDs, heparin gtt  HOB >30: Yes  Stress Ulcer ppx: PPI  Glucose control: Critical care goal 140-180 g/dl, ISS     Lines/Drains/Airway: NG; RIJ CVC, r-radial a-line, sun; WV, VAD; midline      Dispo/Code Status/Palliative:   -- SICU / Full Code.

## 2022-07-27 NOTE — ASSESSMENT & PLAN NOTE
Leukocytosis with associated fever post-decannulation. Blcx so far ngtd, resp cx unrevealing. Pt is at risk for fungal infection (prior lines, multiple surgeries). S/P HM3 with removal of old driveline. Up-escalated to meropenem due to acute decompensation.     Sputum cultures with K aerogenes. Meropenem transitioned to cefepime on 7/25. Leukocytosis stable. Febrile overnight. DLES cultures performed on 7/25 are no growth. Repeat sputum cultures with GNR pending identification.  · Continue cefepime.  · Will start metronidazole for anaerobic coverage.  · Will follow up sputum, and blood cultures.   · Consider abdominal imaging to assess for intraabdominal fluid collections, hematomas, or ischemic gut as underlying etiologies of fever.   · Further antibiotic optimization with culture results.

## 2022-07-27 NOTE — PROGRESS NOTES
Update    Pt presents as AAO x4, calm, cooperative, and asking and answering questions appropriately, no caregivers present. Pt recently extubated, sitting up and watching the [employer]. Pt states he has a positive attitude and pushing forward. Pt had no additional needs or concerns during visit. LCSW offered support and encouragement. If any needs arise, Pt will notify bedside RN to speak to LCSW. SW providing ongoing psychosocial, counseling, & emotional support, education, resources, assistance, and discharge planning as indicated.  SW to continue to follow.

## 2022-07-27 NOTE — PROGRESS NOTES
[facility]  Critical Care - Surgery  Progress Note    Patient Name: Tim Richards  MRN: [id number]  Admission Date: 6/27/2022  Hospital Length of Stay: 30 days  Code Status: Full Code  Attending Provider: Yg Kaufman MD  Primary Care Provider: Deyanira Booth MD   Principal Problem: Left ventricular assist device (LVAD) complication    Subjective:     Hospital/ICU Course:  No notes on file    Interval History/Significant Events: Weaned off pressors yesterday and then extubated in the afternoon. Increased oxygen requirements overnight requiring comfort flow. Monitoring oxygenation with ABG due to inability to get sat probe to work. Continues to be intermittently febrile. Cultures drawn yesterday.      Follow-up For: Procedure(s) (LRB):  REMOVAL, FOREIGN BODY (N/A)    Post-Operative Day: 5 Days Post-Op    Objective:     Vital Signs (Most Recent):  Temp: (!) 100.76 °F (38.2 °C) (07/27/22 0915)  Pulse: 79 (07/27/22 0915)  Resp: (!) 28 (07/27/22 0735)  BP: (!) 84/0 (07/27/22 0730)  SpO2: (!) 88 % (07/27/22 0300)   Vital Signs (24h Range):  Temp:  [98 °F (36.7 °C)-101.66 °F (38.7 °C)] 100.76 °F (38.2 °C)  Pulse:  [] 79  Resp:  [20-30] 28  SpO2:  [49 %-95 %] 88 %  BP: (78-88)/(0) 84/0  Arterial Line BP: ()/(58-86) 87/76     Weight: 101.2 kg (223 lb)  Body mass index is 29.42 kg/m².      Intake/Output Summary (Last 24 hours) at 7/27/2022 0921  Last data filed at 7/27/2022 0900  Gross per 24 hour   Intake 2557.57 ml   Output 2365 ml   Net 192.57 ml       Physical Exam  Constitutional:       General: He is not in acute distress.  HENT:      Head: Normocephalic and atraumatic.      Nose:      Comments: NG in place  Eyes:      Pupils: Pupils are equal, round, and reactive to light.   Neck:      Comments: R IJ CVC    Cardiovascular:      Rate and Rhythm: Normal rate. Rhythm irregular.      Comments: LVAD in place -- 6400 rpm, 5.0L    Midline sternotomy c/d with dressing in  place      Pulmonary:      Comments: On comfort flow. Persistent tachypnea.    Very weak voice  Abdominal:      General: There is no distension.      Palpations: Abdomen is soft.      Comments: Prior driveline site packed with iodoform gauze   Genitourinary:     Comments: Lagunas in place draining clear urine  Musculoskeletal:      Comments: ECMO cannula sites with dressing in place.     Cutdown incision with seroma with wound vac in place. Minimal output    right radial arterial line   Skin:     General: Skin is warm and dry.   Neurological:      General: No focal deficit present.      Mental Status: He is alert.      Comments: Responding to questions with head nods, following commands       Vents:  Vent Mode: Spont (07/26/22 1106)  Ventilator Initiated: Yes (07/15/22 1027)  Set Rate: 16 BPM (07/26/22 0732)  Vt Set: 450 mL (07/26/22 0732)  Pressure Support: 10 cmH20 (07/26/22 1106)  PEEP/CPAP: 5 cmH20 (07/26/22 1106)  Oxygen Concentration (%): 80 (07/27/22 0735)  Peak Airway Pressure: 16 cmH2O (07/26/22 1106)  Plateau Pressure: 21 cmH20 (07/26/22 1106)  Total Ve: 13.8 mL (07/26/22 1106)  Negative Inspiratory Force (cm H2O): -27 (07/26/22 1222)  F/VT Ratio<105 (RSBI): (!) 59.09 (07/26/22 0706)    Lines/Drains/Airways       Central Venous Catheter Line  Duration             Percutaneous Central Line Insertion/Assessment - Quad Lumen  07/21/22 1515 right internal jugular 5 days              Drain  Duration                  Urethral Catheter 07/13/22 0848 Temperature probe;Latex 14 Fr. 14 days         NG/OG Tube 07/15/22 1030 Left nostril 11 days              Arterial Line  Duration             Arterial Line 07/13/22 2000 Right Radial 13 days              Line  Duration                  VAD 07/13/22 1300 Left ventricular assist device HeartMate 3 13 days              Peripheral Intravenous Line  Duration                  Midline Catheter Insertion/Assessment  - Single Lumen 07/21/22 1616 Left basilic vein (medial side of  arm) 18g x 10cm 5 days                    Significant Labs:    CBC/Anemia Profile:  Recent Labs   Lab 07/26/22  1145 07/26/22  1306 07/26/22  1749 07/26/22  1950 07/27/22  0348   WBC 18.18*  --   --  22.84* 22.30*   HGB 7.6*  --   --  7.8* 7.7*   HCT 24.1*   < > 25* 25.2* 24.9*     --   --  472* 462*   MCV 94  --   --  96 96   RDW 20.1*  --   --  20.8* 21.2*    < > = values in this interval not displayed.        Chemistries:  Recent Labs   Lab 07/26/22  1145 07/26/22  1950 07/27/22  0348    140 142   K 4.1 3.9 4.7    108 108   CO2 22* 23 22*   BUN 33* 32* 35*   CREATININE 1.4 1.4 1.5*   CALCIUM 9.1 9.0 9.2   ALBUMIN 2.2* 2.2* 2.2*  2.2*   PROT 6.6 6.6 6.7  6.7   BILITOT 10.7* 10.1* 9.4*  9.4*   ALKPHOS 145* 142* 137*  137*   ALT 43 48* 48*  48*   AST 89* 81* 78*  78*   MG 2.5 2.3 2.6   PHOS 2.3* 3.1 3.5       ABGs:   Recent Labs   Lab 07/27/22  0914   PH 7.328*   PCO2 43.9   HCO3 23.0*   POCSATURATED 95   BE -3     Coagulation:   Recent Labs   Lab 07/27/22  0348   INR 1.2   APTT 36.5*     Lactic Acid: No results for input(s): LACTATE in the last 48 hours.  All pertinent labs within the past 24 hours have been reviewed.    Significant Imaging:  I have reviewed all pertinent imaging results/findings within the past 24 hours.    Assessment/Plan:     Chronic combined systolic and diastolic heart failure  Tim Richards is a 56 y.o. male HFrEF with an EF of 10% and a history of HM2 LVAD in 2018 who is now s/p HM3 upgrade, initiation of V-A ECMO after failure to wean off bypass x2      Neuro/Psych:   -- Sedation: Precedex.  -- Pain: PRN Dilaudid             Cards:   -- S/P LVAD exchange on 7/14/22  --Improving pressor requirements, wean gwen for MAP > 75, keep other pressors the same   Epi 0.02   Levo off   Vaso 0.04   Giapreza 5  -- Continue Midodrine 15 mg Q8  -- Stress dose steroids complete  -- MAP goal 75  -- VAD  flows stable  -- Decannulated on 7/19  -- Sternal closure on 7/15  --  Bedside drive line removal on 7/23  -- EP consult due to v-tach  -- Continue lidocaine, continue amio ggt, will consider restarting mexiletine      Pulm:   -- Goal O2 sat > 90%  -- ABG PRN  -- Chest tubes removed  -- Nitric weaned off  -- Extubated 7/26, now on comfort flow, may need reintubation today  -- Trach consult; tentatively planning for Thursday if patient needs reintubation  -- Monitor O2 sat with ABGs      Renal:  -- Keep sun for strict I/O  -- Trend BUN/Cr  -- Lasix ggt 1/hr      FEN / GI:   -- Replace lytes as needed  -- Nutrition: NPO, holding TF for now due to respiratory status  -- GI ppx: PPI  -- Bowel reg: miralax, docusate, mag citrate      ID:   -- Tm: febrile; WBC increasing 22.3 from 18.1  -- ABX cefepime  -- Cultures sent, repeat cultures sent 7/22, no growth to date, repeat cultures sent 7/26  -- BAL GNR --> pansensitive klebsiella   -- ID consult   -- daily vanc levels  -- Line exchange 7/21  -- consider CT scan due to ongoing intermittent fevers and increasing WBC      Heme/Onc:   -- H/H stable   -- Daily CBC  -- Received 18 pRBCs, 16 FFP, 2 plt, 25 cryo; 1500 albumin intra-op  -- Heparin gtt at 2200, goal anti Xa 0.3  -- Warfarin 4mg      Endo:   -- BG goal 140-180  -- SSI  -- Levothyroxine      PPx:   Feeding: NPO, TF  Analgesia/Sedation: Precedex / PRN Dilaudid  Thromboembolic prevention: SCDs, heparin gtt  HOB >30: Yes  Stress Ulcer ppx: PPI  Glucose control: Critical care goal 140-180 g/dl, ISS     Lines/Drains/Airway: NG; RIJ CVC, r-radial a-line, sun; WV, VAD; midline      Dispo/Code Status/Palliative:   -- SICU / Full Code.            Dang Amezcua MD  Critical Care - Surgery  [facility]

## 2022-07-27 NOTE — PLAN OF CARE
Patient extubated yesterday, doing well on comfortflo. No indication for surgical airway at this time. General surgery will sign off, please call us if needed.    Monica West MD  Pager: [phone]  General Surgery PGY-4  [facility]

## 2022-07-27 NOTE — PROGRESS NOTES
[facility] includes the [facility] - Surgical Intensive Care  Infectious Disease  Progress Note    Patient Name: Tim Richards  MRN: [id number]  Admission Date: 6/27/2022  Length of Stay: 30 days  Attending Physician: Yg Kaufman MD  Primary Care Provider: Deyanira Booth MD    Isolation Status: No active isolations  Assessment/Plan:      Shock  Likely multifactorial . On multiple pressor support.   · Management per primary.    Leukocytosis  Leukocytosis with associated fever post-decannulation. Blcx so far ngtd, resp cx unrevealing. Pt is at risk for fungal infection (prior lines, multiple surgeries). S/P HM3 with removal of old driveline. Up-escalated to meropenem due to acute decompensation.     Sputum cultures with K aerogenes. Meropenem transitioned to cefepime on 7/25. Leukocytosis stable. Febrile overnight. DLES cultures performed on 7/25 are no growth. Repeat sputum cultures with GNR pending identification.  · Continue cefepime.  · Will start metronidazole for anaerobic coverage.  · Will follow up sputum, and blood cultures.   · Consider abdominal imaging to assess for intraabdominal fluid collections, hematomas, or ischemic gut as underlying etiologies of fever.   · Further antibiotic optimization with culture results.          Anticipated Disposition: per primary    Thank you for your consult. I will follow-up with patient. Please contact us if you have any additional questions.    Roxie Garg MD  Infectious Disease  [facility] - Surgical Intensive Care    Subjective:     Principal Problem:Left ventricular assist device (LVAD) complication    HPI: A 56-year-old man with HFrEF-10%, s/p VAD HM2 (March 2018), ICD, VT on amiodarone who developed malaise, anorexia, SOB, dark urine, and soft stools 3-4 days prior to admission. He was evaluated in [facility] [place] at which time he tested positive for Covid-19 infection. He was admitted for further management and started on Remdesivir treatment protocol. Since admission, he feels  "significantly improved with bowel movements returning to normal and the shortness of breath subsiding.     Infectious Diseases consulted for "covid infection, acute. Patient with LVAD"    ID reconsulted 7/21 for "sepsis and consideration for fungemia". Since pt was last seen by ID, patient underwent AV repair, redo sternotomy, explant of old lvad HM2 with implantation of HM3, and placed on VA-ECMO, takeback 7/14 for mediastinal washout/hematoma, and washout, sternal closure, creation of moises-pericardium (gerotex membrane) and wound vac placement on 7/15. Decannulated ECMO on 7/19 with subsequent fever and hypotension. Pt was placed on broad spectrum abx. Blcx obtained from 7/20 ngtd. S/p bronch on 7/20 - cx with normal respiratory sachin.           Interval History: "". Patient remains intubated and febrile. BAL culture on 7/20 now with K aerogenes. Blood cultures remain NGTD. Yellow mucoid drainage noted at DLES. Brown yellow secretions at prior chest tube exit site. Transitioned from meropenem to cefepime on 7/25.     Extubated on 7/26. Febrile to 101.6 F. Repeat sputum culture with GNR pending identification.     Review of Systems   Unable to perform ROS: Severe respiratory distress   Objective:     Vital Signs (Most Recent):  Temp: 99.86 °F (37.7 °C) (07/27/22 1130)  Pulse: 94 (07/27/22 1132)  Resp: (!) 24 (07/27/22 1132)  BP: (!) 86/0 (07/27/22 1124)  SpO2: 96 % (07/27/22 1132)   Vital Signs (24h Range):  Temp:  [98 °F (36.7 °C)-101.66 °F (38.7 °C)] 99.86 °F (37.7 °C)  Pulse:  [] 94  Resp:  [20-30] 24  SpO2:  [49 %-96 %] 96 %  BP: (78-88)/(0) 86/0  Arterial Line BP: ()/(58-86) 82/70     Weight: 101.2 kg (223 lb)  Body mass index is 29.42 kg/m².    Estimated Creatinine Clearance: 69.6 mL/min (A) (based on SCr of 1.5 mg/dL (H)).    Physical Exam  Vitals and nursing note reviewed. Exam conducted with a chaperone present.   Constitutional:       General: He is sleeping.      Appearance: He is " ill-appearing.   HENT:      Head: Normocephalic and atraumatic.   Eyes:      General: Scleral icterus present.         Right eye: No discharge.         Left eye: No discharge.      Conjunctiva/sclera: Conjunctivae normal.   Cardiovascular:      Pulses: Normal pulses.      Comments: Distant VAD Humming sounds  Pulmonary:      Effort: Tachypnea and accessory muscle usage present. No respiratory distress.      Breath sounds: No stridor. Rhonchi present. No wheezing or rales.   Abdominal:      General: Bowel sounds are decreased. There is no distension.      Palpations: Abdomen is soft.      Tenderness: There is no abdominal tenderness.          Comments: Left sided DLES covered with gauze dressing. Prior chest tube sites covered with gauze dressing.     Musculoskeletal:         General: Normal range of motion.   Skin:     General: Skin is warm and dry.   Neurological:      General: No focal deficit present.      Mental Status: He is easily aroused.      Comments: Awakens with verbal stimuli. Shakes head for yes/no questions.        Significant Labs: Blood Culture:   Recent Labs   Lab 07/20/22  1450 07/22/22  0857 07/22/22  0918 07/26/22  1200 07/26/22  1207   LABBLOO No growth after 5 days. No growth after 5 days. No growth after 5 days. No Growth to date No Growth to date       BMP:   Recent Labs   Lab 07/27/22  0348         K 4.7      CO2 22*   BUN 35*   CREATININE 1.5*   CALCIUM 9.2   MG 2.6       CBC:   Recent Labs   Lab 07/26/22  1145 07/26/22  1306 07/26/22  1749 07/26/22  1950 07/27/22  0348   WBC 18.18*  --   --  22.84* 22.30*   HGB 7.6*  --   --  7.8* 7.7*   HCT 24.1*   < > 25* 25.2* 24.9*     --   --  472* 462*    < > = values in this interval not displayed.       Respiratory Culture:   Recent Labs   Lab 07/20/22  1120 07/26/22  1010   GSRESP Few WBC's  Rare Gram positive cocci  Rare Gram negative rods <10 epithelial cells per low power field.  Rare WBC's  No organisms seen    RESPIRATORYC  --  GRAM NEGATIVE LAURA  Rare  Identification and susceptibility pending  *       Urine Culture: No results for input(s): LABURIN in the last 4320 hours.  Urine Studies:   Recent Labs   Lab 06/28/22  1137   COLORU Yellow   APPEARANCEUA Clear   PHUR 5.0   SPECGRAV 1.010   PROTEINUA Negative   GLUCUA Negative   KETONESU Negative   BILIRUBINUA Negative   OCCULTUA 2+*   NITRITE Negative   LEUKOCYTESUR Negative   RBCUA 0   WBCUA 2   SQUAMEPITHEL 0   HYALINECASTS 4*       Wound Culture:   Recent Labs   Lab 07/25/22  1503   LABAERO No growth       Significant Imaging: I have reviewed all pertinent imaging results/findings within the past 24 hours.

## 2022-07-28 PROBLEM — T46.2X5A AMIODARONE-INDUCED HYPERTHYROIDISM: Status: ACTIVE | Noted: 2020-01-13

## 2022-07-28 PROBLEM — E05.80 AMIODARONE-INDUCED HYPERTHYROIDISM: Status: ACTIVE | Noted: 2020-01-13

## 2022-07-28 LAB
ALBUMIN SERPL BCP-MCNC: 2.2 G/DL (ref 3.5–5.2)
ALBUMIN SERPL BCP-MCNC: 2.2 G/DL (ref 3.5–5.2)
ALBUMIN SERPL BCP-MCNC: 2.3 G/DL (ref 3.5–5.2)
ALLENS TEST: ABNORMAL
ALLENS TEST: NORMAL
ALP SERPL-CCNC: 126 U/L (ref 55–135)
ALP SERPL-CCNC: 134 U/L (ref 55–135)
ALP SERPL-CCNC: 136 U/L (ref 55–135)
ALT SERPL W/O P-5'-P-CCNC: 46 U/L (ref 10–44)
ALT SERPL W/O P-5'-P-CCNC: 49 U/L (ref 10–44)
ALT SERPL W/O P-5'-P-CCNC: 50 U/L (ref 10–44)
ANION GAP SERPL CALC-SCNC: 12 MMOL/L (ref 8–16)
ANION GAP SERPL CALC-SCNC: 12 MMOL/L (ref 8–16)
ANION GAP SERPL CALC-SCNC: 15 MMOL/L (ref 8–16)
ANISOCYTOSIS BLD QL SMEAR: SLIGHT
ANISOCYTOSIS BLD QL SMEAR: SLIGHT
APTT BLDCRRT: 53.7 SEC (ref 21–32)
APTT BLDCRRT: 57.1 SEC (ref 21–32)
ASCENDING AORTA: 2.87 CM
AST SERPL-CCNC: 57 U/L (ref 10–40)
AST SERPL-CCNC: 62 U/L (ref 10–40)
AST SERPL-CCNC: 67 U/L (ref 10–40)
BACTERIA SPEC AEROBE CULT: ABNORMAL
BACTERIA SPEC AEROBE CULT: ABNORMAL
BASO STIPL BLD QL SMEAR: ABNORMAL
BASO STIPL BLD QL SMEAR: ABNORMAL
BASOPHILS # BLD AUTO: 0.03 K/UL (ref 0–0.2)
BASOPHILS # BLD AUTO: 0.04 K/UL (ref 0–0.2)
BASOPHILS # BLD AUTO: 0.05 K/UL (ref 0–0.2)
BASOPHILS NFR BLD: 0.1 % (ref 0–1.9)
BASOPHILS NFR BLD: 0.2 % (ref 0–1.9)
BASOPHILS NFR BLD: 0.2 % (ref 0–1.9)
BILIRUB SERPL-MCNC: 8.3 MG/DL (ref 0.1–1)
BILIRUB SERPL-MCNC: 8.9 MG/DL (ref 0.1–1)
BILIRUB SERPL-MCNC: 8.9 MG/DL (ref 0.1–1)
BSA FOR ECHO PROCEDURE: 2.32 M2
BUN SERPL-MCNC: 47 MG/DL (ref 6–20)
BUN SERPL-MCNC: 49 MG/DL (ref 6–20)
BUN SERPL-MCNC: 54 MG/DL (ref 6–20)
CALCIUM SERPL-MCNC: 9.2 MG/DL (ref 8.7–10.5)
CALCIUM SERPL-MCNC: 9.5 MG/DL (ref 8.7–10.5)
CALCIUM SERPL-MCNC: 9.7 MG/DL (ref 8.7–10.5)
CHLORIDE SERPL-SCNC: 106 MMOL/L (ref 95–110)
CHLORIDE SERPL-SCNC: 106 MMOL/L (ref 95–110)
CHLORIDE SERPL-SCNC: 107 MMOL/L (ref 95–110)
CO2 SERPL-SCNC: 20 MMOL/L (ref 23–29)
CO2 SERPL-SCNC: 22 MMOL/L (ref 23–29)
CO2 SERPL-SCNC: 23 MMOL/L (ref 23–29)
CREAT SERPL-MCNC: 1.7 MG/DL (ref 0.5–1.4)
CREAT SERPL-MCNC: 2.1 MG/DL (ref 0.5–1.4)
CREAT SERPL-MCNC: 2.3 MG/DL (ref 0.5–1.4)
CV ECHO LV RWT: 0.24 CM
DACRYOCYTES BLD QL SMEAR: ABNORMAL
DELSYS: ABNORMAL
DELSYS: NORMAL
DIFFERENTIAL METHOD: ABNORMAL
DOP CALC LVOT AREA: 3.1 CM2
DOP CALC LVOT DIAMETER: 1.98 CM
ECHO LV POSTERIOR WALL: 0.89 CM (ref 0.6–1.1)
EJECTION FRACTION: 13 %
EOSINOPHIL # BLD AUTO: 0 K/UL (ref 0–0.5)
EOSINOPHIL NFR BLD: 0 % (ref 0–8)
ERYTHROCYTE [DISTWIDTH] IN BLOOD BY AUTOMATED COUNT: 21.7 % (ref 11.5–14.5)
ERYTHROCYTE [DISTWIDTH] IN BLOOD BY AUTOMATED COUNT: 21.7 % (ref 11.5–14.5)
ERYTHROCYTE [DISTWIDTH] IN BLOOD BY AUTOMATED COUNT: 22.5 % (ref 11.5–14.5)
ERYTHROCYTE [SEDIMENTATION RATE] IN BLOOD BY WESTERGREN METHOD: 38 MM/H
EST. GFR  (AFRICAN AMERICAN): 35.6 ML/MIN/1.73 M^2
EST. GFR  (AFRICAN AMERICAN): 39.8 ML/MIN/1.73 M^2
EST. GFR  (AFRICAN AMERICAN): 51.3 ML/MIN/1.73 M^2
EST. GFR  (NON AFRICAN AMERICAN): 30.8 ML/MIN/1.73 M^2
EST. GFR  (NON AFRICAN AMERICAN): 34.4 ML/MIN/1.73 M^2
EST. GFR  (NON AFRICAN AMERICAN): 44.4 ML/MIN/1.73 M^2
FACT X PPP CHRO-ACNC: 0.32 IU/ML (ref 0.3–0.7)
FACT X PPP CHRO-ACNC: 0.34 IU/ML (ref 0.3–0.7)
FACT X PPP CHRO-ACNC: 0.48 IU/ML (ref 0.3–0.7)
FIBRINOGEN PPP-MCNC: 772 MG/DL (ref 182–400)
FIO2: 100
FIO2: 60
FIO2: 60
FLOW: 25
FRACTIONAL SHORTENING: 1 % (ref 28–44)
GIANT PLATELETS BLD QL SMEAR: PRESENT
GLUCOSE SERPL-MCNC: 104 MG/DL (ref 70–110)
GLUCOSE SERPL-MCNC: 104 MG/DL (ref 70–110)
GLUCOSE SERPL-MCNC: 115 MG/DL (ref 70–110)
GRAM STN SPEC: ABNORMAL
HCO3 UR-SCNC: 23.5 MMOL/L (ref 24–28)
HCO3 UR-SCNC: 23.6 MMOL/L (ref 24–28)
HCO3 UR-SCNC: 23.7 MMOL/L (ref 24–28)
HCO3 UR-SCNC: 23.9 MMOL/L (ref 24–28)
HCO3 UR-SCNC: 24.2 MMOL/L (ref 24–28)
HCO3 UR-SCNC: 24.4 MMOL/L (ref 24–28)
HCO3 UR-SCNC: 25.9 MMOL/L (ref 24–28)
HCT VFR BLD AUTO: 24.9 % (ref 40–54)
HCT VFR BLD AUTO: 25.1 % (ref 40–54)
HCT VFR BLD AUTO: 25.5 % (ref 40–54)
HGB BLD-MCNC: 7.5 G/DL (ref 14–18)
HGB BLD-MCNC: 7.7 G/DL (ref 14–18)
HGB BLD-MCNC: 7.8 G/DL (ref 14–18)
HYPOCHROMIA BLD QL SMEAR: ABNORMAL
IMM GRANULOCYTES # BLD AUTO: 0.49 K/UL (ref 0–0.04)
IMM GRANULOCYTES # BLD AUTO: 0.5 K/UL (ref 0–0.04)
IMM GRANULOCYTES # BLD AUTO: 0.62 K/UL (ref 0–0.04)
IMM GRANULOCYTES NFR BLD AUTO: 2 % (ref 0–0.5)
IMM GRANULOCYTES NFR BLD AUTO: 2.2 % (ref 0–0.5)
IMM GRANULOCYTES NFR BLD AUTO: 2.6 % (ref 0–0.5)
INR PPP: 1.4 (ref 0.8–1.2)
INR PPP: 1.6 (ref 0.8–1.2)
INR PPP: 2 (ref 0.8–1.2)
INTERVENTRICULAR SEPTUM: 1.2 CM (ref 0.6–1.1)
LA MAJOR: 8.04 CM
LA MINOR: 7.23 CM
LA WIDTH: 3.32 CM
LDH SERPL L TO P-CCNC: 441 U/L (ref 110–260)
LEFT ATRIUM SIZE: 3.83 CM
LEFT ATRIUM VOLUME INDEX: 35.9 ML/M2
LEFT ATRIUM VOLUME: 82.29 CM3
LEFT INTERNAL DIMENSION IN SYSTOLE: 7.39 CM (ref 2.1–4)
LEFT VENTRICLE DIASTOLIC VOLUME INDEX: 130.09 ML/M2
LEFT VENTRICLE DIASTOLIC VOLUME: 297.91 ML
LEFT VENTRICLE MASS INDEX: 167 G/M2
LEFT VENTRICLE SYSTOLIC VOLUME INDEX: 126 ML/M2
LEFT VENTRICLE SYSTOLIC VOLUME: 288.44 ML
LEFT VENTRICULAR INTERNAL DIMENSION IN DIASTOLE: 7.5 CM (ref 3.5–6)
LEFT VENTRICULAR MASS: 383.4 G
LYMPHOCYTES # BLD AUTO: 0.4 K/UL (ref 1–4.8)
LYMPHOCYTES # BLD AUTO: 0.5 K/UL (ref 1–4.8)
LYMPHOCYTES # BLD AUTO: 0.8 K/UL (ref 1–4.8)
LYMPHOCYTES NFR BLD: 1.5 % (ref 18–48)
LYMPHOCYTES NFR BLD: 2.2 % (ref 18–48)
LYMPHOCYTES NFR BLD: 3.3 % (ref 18–48)
MAGNESIUM SERPL-MCNC: 2.4 MG/DL (ref 1.6–2.6)
MAGNESIUM SERPL-MCNC: 2.4 MG/DL (ref 1.6–2.6)
MAGNESIUM SERPL-MCNC: 2.5 MG/DL (ref 1.6–2.6)
MAGNESIUM SERPL-MCNC: 2.6 MG/DL (ref 1.6–2.6)
MCH RBC QN AUTO: 28.3 PG (ref 27–31)
MCH RBC QN AUTO: 28.6 PG (ref 27–31)
MCH RBC QN AUTO: 29.2 PG (ref 27–31)
MCHC RBC AUTO-ENTMCNC: 29.9 G/DL (ref 32–36)
MCHC RBC AUTO-ENTMCNC: 30.6 G/DL (ref 32–36)
MCHC RBC AUTO-ENTMCNC: 30.9 G/DL (ref 32–36)
MCV RBC AUTO: 93 FL (ref 82–98)
MCV RBC AUTO: 95 FL (ref 82–98)
MCV RBC AUTO: 96 FL (ref 82–98)
MIN VOL: 13.5
MIN VOL: 15.4
MODE: ABNORMAL
MODE: NORMAL
MONOCYTES # BLD AUTO: 1.3 K/UL (ref 0.3–1)
MONOCYTES # BLD AUTO: 2 K/UL (ref 0.3–1)
MONOCYTES # BLD AUTO: 2.1 K/UL (ref 0.3–1)
MONOCYTES NFR BLD: 5.6 % (ref 4–15)
MONOCYTES NFR BLD: 8.2 % (ref 4–15)
MONOCYTES NFR BLD: 8.3 % (ref 4–15)
NEUTROPHILS # BLD AUTO: 20.4 K/UL (ref 1.8–7.7)
NEUTROPHILS # BLD AUTO: 20.7 K/UL (ref 1.8–7.7)
NEUTROPHILS # BLD AUTO: 21.9 K/UL (ref 1.8–7.7)
NEUTROPHILS NFR BLD: 85.7 % (ref 38–73)
NEUTROPHILS NFR BLD: 87.4 % (ref 38–73)
NEUTROPHILS NFR BLD: 90.5 % (ref 38–73)
NRBC BLD-RTO: 0 /100 WBC
NRBC BLD-RTO: 0 /100 WBC
NRBC BLD-RTO: 1 /100 WBC
OVALOCYTES BLD QL SMEAR: ABNORMAL
PCO2 BLDA: 41.2 MMHG (ref 35–45)
PCO2 BLDA: 41.3 MMHG (ref 35–45)
PCO2 BLDA: 41.9 MMHG (ref 35–45)
PCO2 BLDA: 47.6 MMHG (ref 35–45)
PCO2 BLDA: 51.5 MMHG (ref 35–45)
PCO2 BLDA: 57.1 MMHG (ref 35–45)
PCO2 BLDA: 63 MMHG (ref 35–45)
PEEP: 0
PEEP: 8
PEEP: 8
PH SMN: 7.18 [PH] (ref 7.35–7.45)
PH SMN: 7.22 [PH] (ref 7.35–7.45)
PH SMN: 7.31 [PH] (ref 7.35–7.45)
PH SMN: 7.31 [PH] (ref 7.35–7.45)
PH SMN: 7.36 [PH] (ref 7.35–7.45)
PH SMN: 7.37 [PH] (ref 7.35–7.45)
PH SMN: 7.38 [PH] (ref 7.35–7.45)
PHOSPHATE SERPL-MCNC: 4.3 MG/DL (ref 2.7–4.5)
PHOSPHATE SERPL-MCNC: 4.7 MG/DL (ref 2.7–4.5)
PHOSPHATE SERPL-MCNC: 5.4 MG/DL (ref 2.7–4.5)
PISA TR MAX VEL: 3.03 M/S
PLATELET # BLD AUTO: 465 K/UL (ref 150–450)
PLATELET # BLD AUTO: 479 K/UL (ref 150–450)
PLATELET # BLD AUTO: 494 K/UL (ref 150–450)
PLATELET BLD QL SMEAR: ABNORMAL
PLATELET BLD QL SMEAR: ABNORMAL
PMV BLD AUTO: 11.1 FL (ref 9.2–12.9)
PMV BLD AUTO: 11.2 FL (ref 9.2–12.9)
PMV BLD AUTO: 11.3 FL (ref 9.2–12.9)
PO2 BLDA: 207 MMHG (ref 80–100)
PO2 BLDA: 61 MMHG (ref 80–100)
PO2 BLDA: 63 MMHG (ref 80–100)
PO2 BLDA: 71 MMHG (ref 80–100)
PO2 BLDA: 83 MMHG (ref 80–100)
PO2 BLDA: 86 MMHG (ref 80–100)
PO2 BLDA: 90 MMHG (ref 80–100)
POC BE: -1 MMOL/L
POC BE: -1 MMOL/L
POC BE: -2 MMOL/L
POC BE: -2 MMOL/L
POC BE: -4 MMOL/L
POC BE: -5 MMOL/L
POC BE: 0 MMOL/L
POC SATURATED O2: 100 % (ref 95–100)
POC SATURATED O2: 84 % (ref 95–100)
POC SATURATED O2: 91 % (ref 95–100)
POC SATURATED O2: 92 % (ref 95–100)
POC SATURATED O2: 94 % (ref 95–100)
POC SATURATED O2: 96 % (ref 95–100)
POC SATURATED O2: 97 % (ref 95–100)
POC TCO2: 25 MMOL/L (ref 23–27)
POC TCO2: 26 MMOL/L (ref 23–27)
POC TCO2: 27 MMOL/L (ref 23–27)
POIKILOCYTOSIS BLD QL SMEAR: SLIGHT
POIKILOCYTOSIS BLD QL SMEAR: SLIGHT
POLYCHROMASIA BLD QL SMEAR: ABNORMAL
POLYCHROMASIA BLD QL SMEAR: ABNORMAL
POTASSIUM SERPL-SCNC: 3.6 MMOL/L (ref 3.5–5.1)
POTASSIUM SERPL-SCNC: 3.6 MMOL/L (ref 3.5–5.1)
POTASSIUM SERPL-SCNC: 4 MMOL/L (ref 3.5–5.1)
POTASSIUM SERPL-SCNC: 4.4 MMOL/L (ref 3.5–5.1)
POTASSIUM SERPL-SCNC: 4.5 MMOL/L (ref 3.5–5.1)
PROT SERPL-MCNC: 6.4 G/DL (ref 6–8.4)
PROT SERPL-MCNC: 6.9 G/DL (ref 6–8.4)
PROT SERPL-MCNC: 7 G/DL (ref 6–8.4)
PROTHROMBIN TIME: 14.7 SEC (ref 9–12.5)
PROTHROMBIN TIME: 16.7 SEC (ref 9–12.5)
PROTHROMBIN TIME: 19.8 SEC (ref 9–12.5)
PS: 8
RA MAJOR: 6.38 CM
RA PRESSURE: 8 MMHG
RA WIDTH: 5.14 CM
RBC # BLD AUTO: 2.65 M/UL (ref 4.6–6.2)
RBC # BLD AUTO: 2.67 M/UL (ref 4.6–6.2)
RBC # BLD AUTO: 2.69 M/UL (ref 4.6–6.2)
RIGHT VENTRICULAR END-DIASTOLIC DIMENSION: 5.1 CM
SAMPLE: ABNORMAL
SAMPLE: NORMAL
SCHISTOCYTES BLD QL SMEAR: ABNORMAL
SCHISTOCYTES BLD QL SMEAR: PRESENT
SINUS: 2.99 CM
SITE: ABNORMAL
SITE: NORMAL
SODIUM SERPL-SCNC: 141 MMOL/L (ref 136–145)
SP02: 60
SPHEROCYTES BLD QL SMEAR: ABNORMAL
SPONT RATE: 30
SPONT RATE: 33
SPONT RATE: 43
STJ: 3.03 CM
STOMATOCYTES BLD QL SMEAR: ABNORMAL
TARGETS BLD QL SMEAR: ABNORMAL
TARGETS BLD QL SMEAR: ABNORMAL
TDI LATERAL: 0.09 M/S
TDI SEPTAL: 0.03 M/S
TDI: 0.06 M/S
TR MAX PG: 37 MMHG
TRICUSPID ANNULAR PLANE SYSTOLIC EXCURSION: 2.2 CM
TV REST PULMONARY ARTERY PRESSURE: 45 MMHG
WBC # BLD AUTO: 22.85 K/UL (ref 3.9–12.7)
WBC # BLD AUTO: 23.8 K/UL (ref 3.9–12.7)
WBC # BLD AUTO: 24.99 K/UL (ref 3.9–12.7)

## 2022-07-28 PROCEDURE — 27100171 HC OXYGEN HIGH FLOW UP TO 24 HOURS

## 2022-07-28 PROCEDURE — 85520 HEPARIN ASSAY: CPT | Mod: 91 | Performed by: THORACIC SURGERY (CARDIOTHORACIC VASCULAR SURGERY)

## 2022-07-28 PROCEDURE — 80053 COMPREHEN METABOLIC PANEL: CPT | Performed by: THORACIC SURGERY (CARDIOTHORACIC VASCULAR SURGERY)

## 2022-07-28 PROCEDURE — 99291 PR CRITICAL CARE, E/M 30-74 MINUTES: ICD-10-PCS | Mod: ,,, | Performed by: INTERNAL MEDICINE

## 2022-07-28 PROCEDURE — 63600175 PHARM REV CODE 636 W HCPCS

## 2022-07-28 PROCEDURE — 94664 DEMO&/EVAL PT USE INHALER: CPT

## 2022-07-28 PROCEDURE — C9113 INJ PANTOPRAZOLE SODIUM, VIA: HCPCS

## 2022-07-28 PROCEDURE — 25000003 PHARM REV CODE 250: Performed by: PHYSICIAN ASSISTANT

## 2022-07-28 PROCEDURE — 97535 SELF CARE MNGMENT TRAINING: CPT

## 2022-07-28 PROCEDURE — 99232 PR SUBSEQUENT HOSPITAL CARE,LEVL II: ICD-10-PCS | Mod: ,,, | Performed by: ANESTHESIOLOGY

## 2022-07-28 PROCEDURE — 25000003 PHARM REV CODE 250

## 2022-07-28 PROCEDURE — 99232 SBSQ HOSP IP/OBS MODERATE 35: CPT | Mod: ,,, | Performed by: ANESTHESIOLOGY

## 2022-07-28 PROCEDURE — 85730 THROMBOPLASTIN TIME PARTIAL: CPT | Performed by: THORACIC SURGERY (CARDIOTHORACIC VASCULAR SURGERY)

## 2022-07-28 PROCEDURE — 85730 THROMBOPLASTIN TIME PARTIAL: CPT | Mod: 91 | Performed by: THORACIC SURGERY (CARDIOTHORACIC VASCULAR SURGERY)

## 2022-07-28 PROCEDURE — 25000242 PHARM REV CODE 250 ALT 637 W/ HCPCS

## 2022-07-28 PROCEDURE — 94799 UNLISTED PULMONARY SVC/PX: CPT

## 2022-07-28 PROCEDURE — 25000003 PHARM REV CODE 250: Performed by: STUDENT IN AN ORGANIZED HEALTH CARE EDUCATION/TRAINING PROGRAM

## 2022-07-28 PROCEDURE — 83735 ASSAY OF MAGNESIUM: CPT | Performed by: THORACIC SURGERY (CARDIOTHORACIC VASCULAR SURGERY)

## 2022-07-28 PROCEDURE — 85610 PROTHROMBIN TIME: CPT | Performed by: THORACIC SURGERY (CARDIOTHORACIC VASCULAR SURGERY)

## 2022-07-28 PROCEDURE — 85025 COMPLETE CBC W/AUTO DIFF WBC: CPT | Performed by: THORACIC SURGERY (CARDIOTHORACIC VASCULAR SURGERY)

## 2022-07-28 PROCEDURE — 80053 COMPREHEN METABOLIC PANEL: CPT | Mod: 91 | Performed by: THORACIC SURGERY (CARDIOTHORACIC VASCULAR SURGERY)

## 2022-07-28 PROCEDURE — 63600175 PHARM REV CODE 636 W HCPCS: Performed by: STUDENT IN AN ORGANIZED HEALTH CARE EDUCATION/TRAINING PROGRAM

## 2022-07-28 PROCEDURE — 99223 PR INITIAL HOSPITAL CARE,LEVL III: ICD-10-PCS | Mod: ,,, | Performed by: INTERNAL MEDICINE

## 2022-07-28 PROCEDURE — 84132 ASSAY OF SERUM POTASSIUM: CPT | Performed by: STUDENT IN AN ORGANIZED HEALTH CARE EDUCATION/TRAINING PROGRAM

## 2022-07-28 PROCEDURE — 25000242 PHARM REV CODE 250 ALT 637 W/ HCPCS: Performed by: THORACIC SURGERY (CARDIOTHORACIC VASCULAR SURGERY)

## 2022-07-28 PROCEDURE — 94640 AIRWAY INHALATION TREATMENT: CPT

## 2022-07-28 PROCEDURE — 99233 SBSQ HOSP IP/OBS HIGH 50: CPT | Mod: ,,, | Performed by: INTERNAL MEDICINE

## 2022-07-28 PROCEDURE — 37799 UNLISTED PX VASCULAR SURGERY: CPT

## 2022-07-28 PROCEDURE — B4185 PARENTERAL SOL 10 GM LIPIDS: HCPCS

## 2022-07-28 PROCEDURE — 27000248 HC VAD-ADDITIONAL DAY

## 2022-07-28 PROCEDURE — 93750 PR INTERROGATE VENT ASSIST DEV, IN PERSON, W PHYSICIAN ANALYSIS: ICD-10-PCS | Mod: ,,, | Performed by: INTERNAL MEDICINE

## 2022-07-28 PROCEDURE — 63600367 HC NITRIC OXIDE PER HOUR

## 2022-07-28 PROCEDURE — 94761 N-INVAS EAR/PLS OXIMETRY MLT: CPT

## 2022-07-28 PROCEDURE — 99233 PR SUBSEQUENT HOSPITAL CARE,LEVL III: ICD-10-PCS | Mod: ,,, | Performed by: INTERNAL MEDICINE

## 2022-07-28 PROCEDURE — A4217 STERILE WATER/SALINE, 500 ML: HCPCS

## 2022-07-28 PROCEDURE — 83735 ASSAY OF MAGNESIUM: CPT | Mod: 91 | Performed by: STUDENT IN AN ORGANIZED HEALTH CARE EDUCATION/TRAINING PROGRAM

## 2022-07-28 PROCEDURE — 83735 ASSAY OF MAGNESIUM: CPT | Mod: 91 | Performed by: THORACIC SURGERY (CARDIOTHORACIC VASCULAR SURGERY)

## 2022-07-28 PROCEDURE — 85520 HEPARIN ASSAY: CPT

## 2022-07-28 PROCEDURE — 84100 ASSAY OF PHOSPHORUS: CPT | Mod: 91 | Performed by: THORACIC SURGERY (CARDIOTHORACIC VASCULAR SURGERY)

## 2022-07-28 PROCEDURE — 93750 INTERROGATION VAD IN PERSON: CPT | Mod: ,,, | Performed by: INTERNAL MEDICINE

## 2022-07-28 PROCEDURE — 25000003 PHARM REV CODE 250: Performed by: THORACIC SURGERY (CARDIOTHORACIC VASCULAR SURGERY)

## 2022-07-28 PROCEDURE — 97530 THERAPEUTIC ACTIVITIES: CPT

## 2022-07-28 PROCEDURE — 97112 NEUROMUSCULAR REEDUCATION: CPT

## 2022-07-28 PROCEDURE — 99223 1ST HOSP IP/OBS HIGH 75: CPT | Mod: ,,, | Performed by: INTERNAL MEDICINE

## 2022-07-28 PROCEDURE — 83615 LACTATE (LD) (LDH) ENZYME: CPT | Performed by: STUDENT IN AN ORGANIZED HEALTH CARE EDUCATION/TRAINING PROGRAM

## 2022-07-28 PROCEDURE — 85384 FIBRINOGEN ACTIVITY: CPT | Performed by: THORACIC SURGERY (CARDIOTHORACIC VASCULAR SURGERY)

## 2022-07-28 PROCEDURE — 92610 EVALUATE SWALLOWING FUNCTION: CPT

## 2022-07-28 PROCEDURE — 63600175 PHARM REV CODE 636 W HCPCS: Performed by: THORACIC SURGERY (CARDIOTHORACIC VASCULAR SURGERY)

## 2022-07-28 PROCEDURE — 99900035 HC TECH TIME PER 15 MIN (STAT)

## 2022-07-28 PROCEDURE — 84100 ASSAY OF PHOSPHORUS: CPT | Performed by: THORACIC SURGERY (CARDIOTHORACIC VASCULAR SURGERY)

## 2022-07-28 PROCEDURE — 82803 BLOOD GASES ANY COMBINATION: CPT

## 2022-07-28 PROCEDURE — 80176 ASSAY OF LIDOCAINE: CPT

## 2022-07-28 PROCEDURE — 20000000 HC ICU ROOM

## 2022-07-28 PROCEDURE — 99291 CRITICAL CARE FIRST HOUR: CPT | Mod: ,,, | Performed by: INTERNAL MEDICINE

## 2022-07-28 RX ORDER — METHIMAZOLE 10 MG/1
40 TABLET ORAL ONCE
Status: COMPLETED | OUTPATIENT
Start: 2022-07-28 | End: 2022-07-28

## 2022-07-28 RX ORDER — METHIMAZOLE 10 MG/1
20 TABLET ORAL 2 TIMES DAILY
Status: DISCONTINUED | OUTPATIENT
Start: 2022-07-29 | End: 2022-08-04

## 2022-07-28 RX ORDER — MEXILETINE HYDROCHLORIDE 200 MG/1
400 CAPSULE ORAL EVERY 8 HOURS
Status: DISCONTINUED | OUTPATIENT
Start: 2022-07-28 | End: 2022-08-28

## 2022-07-28 RX ORDER — POTASSIUM CHLORIDE 14.9 MG/ML
60 INJECTION INTRAVENOUS ONCE
Status: COMPLETED | OUTPATIENT
Start: 2022-07-28 | End: 2022-07-28

## 2022-07-28 RX ORDER — PREDNISONE 20 MG/1
40 TABLET ORAL DAILY
Status: DISCONTINUED | OUTPATIENT
Start: 2022-07-28 | End: 2022-08-03

## 2022-07-28 RX ORDER — LEVALBUTEROL INHALATION SOLUTION 0.63 MG/3ML
0.63 SOLUTION RESPIRATORY (INHALATION) EVERY 6 HOURS
Status: DISCONTINUED | OUTPATIENT
Start: 2022-07-28 | End: 2022-09-01 | Stop reason: HOSPADM

## 2022-07-28 RX ORDER — AMIODARONE HYDROCHLORIDE 200 MG/1
400 TABLET ORAL 3 TIMES DAILY
Status: DISCONTINUED | OUTPATIENT
Start: 2022-07-28 | End: 2022-08-09

## 2022-07-28 RX ADMIN — ASCORBIC ACID, VITAMIN A PALMITATE, CHOLECALCIFEROL, THIAMINE HYDROCHLORIDE, RIBOFLAVIN-5 PHOSPHATE SODIUM, PYRIDOXINE HYDROCHLORIDE, NIACINAMIDE, DEXPANTHENOL, ALPHA-TOCOPHEROL ACETATE, VITAMIN K1, FOLIC ACID, BIOTIN, CYANOCOBALAMIN: 200; 3300; 200; 6; 3.6; 6; 40; 15; 10; 150; 600; 60; 5 INJECTION, SOLUTION INTRAVENOUS at 09:07

## 2022-07-28 RX ADMIN — FUROSEMIDE 5 MG/HR: 10 INJECTION, SOLUTION INTRAMUSCULAR; INTRAVENOUS at 05:07

## 2022-07-28 RX ADMIN — AMIODARONE HYDROCHLORIDE 400 MG: 200 TABLET ORAL at 08:07

## 2022-07-28 RX ADMIN — METRONIDAZOLE 500 MG: 500 TABLET ORAL at 05:07

## 2022-07-28 RX ADMIN — MIDODRINE HYDROCHLORIDE 15 MG: 5 TABLET ORAL at 05:07

## 2022-07-28 RX ADMIN — METRONIDAZOLE 500 MG: 500 TABLET ORAL at 09:07

## 2022-07-28 RX ADMIN — MIDODRINE HYDROCHLORIDE 15 MG: 5 TABLET ORAL at 02:07

## 2022-07-28 RX ADMIN — HYDROXYZINE HYDROCHLORIDE 25 MG: 25 TABLET ORAL at 10:07

## 2022-07-28 RX ADMIN — LEVOTHYROXINE SODIUM 112 MCG: 112 TABLET ORAL at 05:07

## 2022-07-28 RX ADMIN — AMIODARONE HYDROCHLORIDE 400 MG: 200 TABLET ORAL at 09:07

## 2022-07-28 RX ADMIN — PREDNISONE 40 MG: 20 TABLET ORAL at 04:07

## 2022-07-28 RX ADMIN — POLYETHYLENE GLYCOL 3350 17 G: 17 POWDER, FOR SOLUTION ORAL at 08:07

## 2022-07-28 RX ADMIN — HEPARIN SODIUM 2200 UNITS/HR: 5000 INJECTION INTRAVENOUS; SUBCUTANEOUS at 02:07

## 2022-07-28 RX ADMIN — METRONIDAZOLE 500 MG: 500 TABLET ORAL at 02:07

## 2022-07-28 RX ADMIN — CEFEPIME 2 G: 2 INJECTION, POWDER, FOR SOLUTION INTRAVENOUS at 08:07

## 2022-07-28 RX ADMIN — ALBUTEROL SULFATE 2.5 MG: 2.5 SOLUTION RESPIRATORY (INHALATION) at 08:07

## 2022-07-28 RX ADMIN — SMOFLIPID 250 ML: 6; 6; 5; 3 INJECTION, EMULSION INTRAVENOUS at 09:07

## 2022-07-28 RX ADMIN — ACETYLCYSTEINE 4 ML: 100 SOLUTION ORAL; RESPIRATORY (INHALATION) at 08:07

## 2022-07-28 RX ADMIN — PANTOPRAZOLE SODIUM 40 MG: 40 INJECTION, POWDER, FOR SOLUTION INTRAVENOUS at 09:07

## 2022-07-28 RX ADMIN — GUAIFENESIN 300 MG: 100 SOLUTION ORAL at 05:07

## 2022-07-28 RX ADMIN — ASPIRIN 81 MG CHEWABLE TABLET 81 MG: 81 TABLET CHEWABLE at 08:07

## 2022-07-28 RX ADMIN — AMIODARONE HYDROCHLORIDE 400 MG: 200 TABLET ORAL at 02:07

## 2022-07-28 RX ADMIN — MIDODRINE HYDROCHLORIDE 15 MG: 5 TABLET ORAL at 09:07

## 2022-07-28 RX ADMIN — POTASSIUM BICARBONATE 40 MEQ: 391 TABLET, EFFERVESCENT ORAL at 08:07

## 2022-07-28 RX ADMIN — ACETYLCYSTEINE 4 ML: 100 SOLUTION ORAL; RESPIRATORY (INHALATION) at 01:07

## 2022-07-28 RX ADMIN — MEXILETINE HYDROCHLORIDE 400 MG: 200 CAPSULE ORAL at 09:07

## 2022-07-28 RX ADMIN — GUAIFENESIN 300 MG: 100 SOLUTION ORAL at 11:07

## 2022-07-28 RX ADMIN — MEXILETINE HYDROCHLORIDE 400 MG: 200 CAPSULE ORAL at 02:07

## 2022-07-28 RX ADMIN — AMIODARONE HYDROCHLORIDE 0.5 MG/MIN: 1.8 INJECTION, SOLUTION INTRAVENOUS at 10:07

## 2022-07-28 RX ADMIN — ACETYLCYSTEINE 4 ML: 100 SOLUTION ORAL; RESPIRATORY (INHALATION) at 09:07

## 2022-07-28 RX ADMIN — POTASSIUM CHLORIDE 20 MEQ: 14.9 INJECTION, SOLUTION INTRAVENOUS at 02:07

## 2022-07-28 RX ADMIN — LEVALBUTEROL HYDROCHLORIDE 0.63 MG: 0.63 SOLUTION RESPIRATORY (INHALATION) at 09:07

## 2022-07-28 RX ADMIN — GUAIFENESIN 300 MG: 100 SOLUTION ORAL at 12:07

## 2022-07-28 RX ADMIN — METHIMAZOLE 40 MG: 10 TABLET ORAL at 04:07

## 2022-07-28 RX ADMIN — ALBUTEROL SULFATE 2.5 MG: 2.5 SOLUTION RESPIRATORY (INHALATION) at 01:07

## 2022-07-28 RX ADMIN — WARFARIN SODIUM 4 MG: 4 TABLET ORAL at 04:07

## 2022-07-28 RX ADMIN — PANTOPRAZOLE SODIUM 40 MG: 40 INJECTION, POWDER, FOR SOLUTION INTRAVENOUS at 08:07

## 2022-07-28 NOTE — PROGRESS NOTES
[facility] - Surgical Intensive Care  Heart Transplant  Progress Note     Patient Name: Tim Richards  MRN: [id number]  Admission Date: 6/27/2022  Hospital Length of Stay: 31 days  Attending Physician: Yg Kaufman MD  Primary Care Provider: Deyanira Booth MD  Principal Problem:Left ventricular assist device (LVAD) complication    Subjective:   Overnight events:   - Pt was tachypneic overnight. Reports feeling SOB this AM.   - Lasix rate was reduced to 5 then 2.5 overnight. Now back at 10 mg/hr.   - CVP has been running high 16 - 19.   - CXR this AM shows bilateral R>L infiltrates and effusion  - Tmax 101.4 last night.   - Sputum culture growing GNR.      Intake/Output - Last 3 Shifts       07/26 0700 07/27 0659 07/27 0700 07/28 0659 07/28 0700 07/29 0659    I.V. (mL/kg) 1707.7 (16.9) 1640.7 (15.7) 266.1 (2.5)    Blood 0      NG/ 150 60    IV Piggyback 284.4 394.5 170    Total Intake(mL/kg) 2632.2 (26) 2185.2 (20.9) 496.2 (4.7)    Urine (mL/kg/hr) 2315 (1) 4104 (1.6) 360 (0.7)    Drains  50     Other 100 400 50    Stool  0     Total Output 2415 4554 410    Net +217.2 -2368.8 +86.2           Stool Occurrence  1 x           Medications:   Continuous Infusions:   amiodarone in dextrose 5% 0.25 mg/min (07/28/22 1122)    furosemide (LASIX) 2 mg/mL continuous infusion (non-titrating) 7.5 mg/hr (07/28/22 1122)    heparin (porcine) in 5 % dex 2,200 Units/hr (07/28/22 1122)    LIDOcaine 0.5 mg/min (07/28/22 1100)    nitric oxide gas      NORepinephrine bitartrate-D5W Stopped (07/27/22 2015)    TPN ADULT CENTRAL LINE CUSTOM      vasopressin 0.04 Units/min (07/28/22 1100)       Scheduled Meds:   acetylcysteine 100 mg/ml (10%)  4 mL Nebulization Q6H    amiodarone  400 mg Oral TID    aspirin  81 mg Per OG tube Daily    ceFEPime (MAXIPIME) IVPB  2 g Intravenous Q12H    guaiFENesin 100 mg/5 ml  300 mg Per NG tube Q6H    levothyroxine  112 mcg Per OG tube Before breakfast    lipid (SMOFLIPID)   250 mL Intravenous Daily    magnesium citrate  148 mL Oral Once    metroNIDAZOLE  500 mg Oral Q8H    mexiletine  400 mg Oral Q8H    midodrine  15 mg Per NG tube Q8H    pantoprazole  40 mg Intravenous BID    polyethylene glycol  17 g Per NG tube Daily    warfarin  4 mg Oral Daily     PRN Meds:sodium chloride, sodium chloride 0.9%, sodium chloride 0.9%, acetaminophen, albuterol sulfate, bisacodyL, dextrose 10%, dextrose 10%, HYDROmorphone, HYDROmorphone, hydrOXYzine HCL  Objective:     Vitals:  Temp:  [97.88 °F (36.6 °C)-101.48 °F (38.6 °C)]   Pulse:  []   Resp:  [18-54]   BP: (76-85)/(0)   SpO2:  [91 %-100 %]   Arterial Line BP: (60-97)/(45-75)   I/O's:    Intake/Output Summary (Last 24 hours) at 7/28/2022 1204  Last data filed at 7/28/2022 1100  Gross per 24 hour   Intake 2013.85 ml   Output 3809 ml   Net -1795.15 ml       Constitutional: No distress, obese, conversant  Constitutional:       General: He is mildly tachypneic.     Appearance: He is ill-appearing.   HENT:      Head: Normocephalic.      Nose: Nose normal.      Mouth: Mucous membranes are moist.   Cardiovascular:      Rate and Rhythm: Normal rate and regular rhythm.      Comments: VAD hum appreciated  Pulses detected via doppler  Pulmonary:      Comments: Bilateral basal crackles. Peripheral edema noted.   Abdominal:      General: Bowel sounds are normal.      Palpations: Abdomen is soft.   Musculoskeletal:      Cervical back: Neck supple.      Right lower leg: + edema.      Left lower leg: + edema.   Skin:     General: Skin is warm.   Neurological:      General: No focal deficit present.     Labs:     Lab Results   Component Value Date    HGBA1C 5.4 04/26/2021     Lab Results   Component Value Date    BNP 3,441 (H) 07/27/2022    BNP 2,301 (H) 07/25/2022    BNP 2,579 (H) 07/22/2022     Lab Results   Component Value Date    CHOL 130 05/19/2022    HDL 46 05/19/2022    LDLCALC 54.8 (L) 05/19/2022    TRIG 43 07/14/2022         Micro:   Blood  Cultures  Lab Results   Component Value Date    LABBLOO No Growth to date 07/26/2022    LABBLOO No Growth to date 07/26/2022    LABBLOO No Growth to date 07/26/2022    LABBLOO No Growth to date 07/26/2022    LABBLOO No growth after 5 days. 07/22/2022     Urine Cultures  Lab Results   Component Value Date    LABURIN No growth 03/23/2018    LABURIN No growth 03/15/2018     Sputum Cultures    Imaging:     EF   Date Value Ref Range Status   07/28/2022 13 % Final   07/21/2022 10 % Final   07/08/2022 13 % Final   07/01/2022 10 % Final   06/28/2022 10 % Final       TTE: {Echo:   EF   Date Value Ref Range Status   07/28/2022 13 % Final   07/21/2022 10 % Final   07/08/2022 13 % Final           Assessment/Plan     55 Y/O M with Hx of stage D HFrEF (EF 10%) due to NICM underwent HM2 implant 3/8/18 and exchange of outflow graft 3/9/18, Hx VT/VF s/p SICD 2014 presenting with gradual worsening shortness of breath associated with nonpleuritic, nonradiating bilateral 4/10 retrosternal chest pressure pain.  Symptoms began yesterday and have gradually worsened in the last 12 hours.  He does report going to a family picnic with increased consumption of chicken wings, ribs and other salty meat products.  Prior to symptom onset, he reports nausea, nonbloody diarrhea began yesterday and single episode of nonbloody nonbilious vomiting this morning. He also complains of dizziness, lightheadedness, and a mild HA. SOB worsened this morning prompting him to come to the ED.      In the ED, patient was in respiratory distress requiring BiPAP. MAP 96 and started on Nitro gtt. Work-up revealed WBC 10, K 5.4, Cr 2.5 (baseline 1.9), BNP 1950 (baseline 200-300s), Bili 2.1, LDH 1058, and INR 3.2. Bedside TTE with severely reduced EF, AV not opening, septum bulging into RV. Given IV Lasix 80 mg x1 with only 100-200 mL UOP and dark in color. HM2 interrogated and flows were 8.5-9 L/min with no alarms or power surges. Cardiology consulted in the ED,  dicussed with HTS, and decision made to admit to ICU for further mgmt.       * Left ventricular assist device (LVAD) complication  The patient presented with COVID and found to have an uptrending LDH with increased power.  He underwent HM III upgrade on 7/13/22.  - PIs now trending down. Rather than decreasing lasix gtt rate, recommend decreasing pump speed for low PI.   - Daily LDH.  - Continue Heparin drip  - Rpt TTE 7/28 showed moderate RVE, mildly reduced RV systolic function, Mod NAVID, CVP 8, PASP 45     Fevers:       - Source unclear. Sputum previously with K aerogenes; repeat sputum culture growing the same.         - Blood cultures negative to date.  Urine culture pending      - CXR 7/28 with R>L bilateral infiltrates       - Currently on antibiotics (cefepime, metronidazole). ID following.        - Consider CT abdomen to rule out fluid collection around driveline.     Chronic combined systolic and diastolic heart failure  ADHF  Underwent HM III upgrade on 7/13/22  Currently on Vasopressin gtt. Off Epi gtt, Levophed and Angiotensin II gtt.  Pt is net + 9L since admission.   Continue Lasix gtt. Continue at rate of 10 mg/hr yesterday.  Being treated for sepsis  Chest tube removal, bronch, and old driveline removed 7/22    History of ventricular tachycardia  Patient experienced an episode of VT on 6/30 and experienced an ICD shock thought to be related to hypokalemia (K of 3.1 on 6/30)  - Aggressively replete K, keep K>4 and Mg>2  - Continue lidocaine gtt, and amiodarone gtt and PO amiodarone.   - EP signed off and recommending Amiodarone and Mexilitine when pt can safely take PO.   - Would try to wean Lidocaine gtt if able  - Continue to monitor on telemetry     COVID-19 - resolved  -Previously hypoxic then recovered  -ID was consulted, recommended Remdesivir, course completed     Elevated serum lactate dehydrogenase (LDH)  S/p HM3 exchange for HM2 pump thrombosis  Trend LDH daily     Amiodarone induced  hypothyrodism  -Continue levothyroxine 112 mcg   - Thyroid function test reveals hyperthyroidism. Recommend consulting Endocrinology.      CKD (chronic kidney disease), stage III  WAGNER on CKD  Patient's baseline is 1.5-2.0  - Nephrology is following  - Avoiding nephrotoxic medications  - Continue to monitor  - Strict I/O's         Signed:  Oliverio Soriano MD, MSCI  [employer] - Cardiology Fellow (visiting)  Heart transplant Service

## 2022-07-28 NOTE — PLAN OF CARE
Significant events: ECHO obtained. Nitric oxide started, 5 ppm. Comfort flow weaned to 25 L 60%.  Vitals/Respiratory:  Comfort flow, 25 L, 60%.  O2: >91%. RR: 30-40's.   HR: 70-80's, intermittent a-fib.  MAP: >65.  CVP: 19-16.  Temperature max: 99.5 F.   Gtts:  Vasopressin at 0.04 units/min, Lidocaine at 0.25 mg/min, Amiodarone at 0.25 mg/min, Lasix at 7.5 mg/hr, and Heparin at 2200 units/hr.   LVAD: HM3. Speed 6400.  Flows 5.5-5.7.  PI  2.5-3.3.    Power 5.5-5.7.  No alarms this shift. DLES to left abdomen with thick green drainage, hydrofera blue foam used with LVAD dressing kit. Old DLES to right abdomen with significant serosanguinous drainage, packed with iodoform gauze.   Wound vac: 200 cc/shift.  Neuro: Pupils equal and reactive.  AAOx4, follows commands, and moves all extremities purposefully.   Diet:  NPO.   Labs/Accuchecks:  Q8 hr labs. Accuchecks Q4 hrs.       Plan:   Wean oxygen as tolerated. Start TPN and lipids overnight. Trend lab values. Continue ICU care. Plan of care reviewed with patient and his wife, all questions answered.     Skin:  Dressings to midsternal incision, DLES to right abdomen, and old DLES to left abdomen changed. Weight shifting assistance provided Q2 hrs. Sacral foam and immerse mattress in use. Heels elevated on pillow. No new skin breakdown noted.

## 2022-07-28 NOTE — ADDENDUM NOTE
Addendum  created 07/28/22 0802 by Kole Woodruff MD    Clinical Note Signed, Intraprocedure Blocks edited, SmartForm saved

## 2022-07-28 NOTE — PROGRESS NOTES
Interdisciplinary Rounds Report:   Attended interdisciplinary rounds with the [facility]/CTS services including the LVAD Coordinators, social workers, cardiologists, surgeons,  PT/OT/Speech, dietician, and unit charge nurses. Discussed patient status, plan of care, goals of care, including DC date, and post discharge needs. Plan of care will be discussed with the patient and/or family per the physician while rounding on the floor. This is a recurring meeting that is medically and socially necessary to collaborate with the interdisciplinary team to assist patient needs and safe discharge.

## 2022-07-28 NOTE — ASSESSMENT & PLAN NOTE
-Pt with hx of Amiodarone induced hyperthyroidism type 2, then developed Hypothyroidism.  - TRAb and TSI - negative. US unremarkable with low vascularity.   - After previous presentation of AIT, he was weaned off methimazole and prednisone by 5/2020  - Then developed hypothyroidism, LT4 started and dose titrated per TFTs    - Prior to current admission he was on LT4 112 mcg qd  - Labs c/w hypothyroidism until current admission, initially on 7/13, with low TSH and elevated fT4. Repeat TFTs on 7/27 with worsening hyperthyroidism. He continued to receive LT4 112 mcg through 7/28.  - He remains on amiodarone for episodes of VT  - Suspect current hyperthyroidism is AIT, however continued exogenous LT4 exacerbated/contributed to current presentation       Recommendations:  - Although mechanism unknown, strongly suspect AIT (type 1 or 2) -- will start treatment for AIT type 2 empirically due to clinical implications in delayed treatment. Ultimately steroids and methimazole would be weaned based on response   - Methimazole 20mg BID and Prednisone 40mg qd  - Daily fT4 and T3  - We discontinued levothyroxine

## 2022-07-28 NOTE — ASSESSMENT & PLAN NOTE
After previous AIT he becme hypothyroid and required thyroid replacement   Levothyroxine discontinued on 7/28/2022

## 2022-07-28 NOTE — HPI
56 year-old  male with NICM with LVAD, s/p ICD for VT on amiodarone and mexiletine and AIT followed by hypothyroidism initially admitted 6/27/2022 with COVID respiratory failure complicated with LVAD pump thrombosis that was refractory to medical therapy. Underwent LVAD pump exchange. Post-op course complicated by worsening cardiogenic shock/RV failure requiring VA-ECMO. Improved clinically underwent successful decannulation. Continued episodes of VT with ICD shock 7/21 and ongoing anti-arrhythmic mediation adjustments. TFTs on 7/27 significant for recurrent hyperthyroidism with undetectable TSH and elevated fT4.    - Patient re-intubated 07/29/2022    - Endocrinology consulted for recurrent AIT    Regarding AIT:    - Last seen outpatient by Dr. 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 He required a prolonged course of prednisone and methimazole, then subsequently developed hypothyroidism in May 2020. LT4 was adjusted based on serial TFT measurements. Most recently levothyroxine dose has been 112 mcg     - He self-decreased his amiodarone dose to 200 mg daily about 6 months ago. T4 was high on 7/13, but he was continued on levothyroxine 112 mcg    Lab Results   Component Value Date    TSH 10.346 (H) 08/26/2022    TSH 3.940 08/21/2022    TSH 0.127 (L) 08/14/2022    FREET4 0.87 08/26/2022    FREET4 0.93 08/21/2022    FREET4 0.98 08/19/2022      Latest Reference Range & Units 08/07/22 03:42 08/08/22 03:10 08/09/22 03:29 08/10/22 03:33   Free T4 0.71 - 1.51 ng/dL 2.51 (H) 2.43 (H) 2.23 (H) 2.12 (H)

## 2022-07-28 NOTE — PROGRESS NOTES
[facility]  Critical Care - Surgery  Progress Note    Patient Name: Tim Richards  MRN: [id number]  Admission Date: 6/27/2022  Hospital Length of Stay: 31 days  Code Status: Full Code  Attending Provider: Yg Kaufman MD  Primary Care Provider: Deyanira Booth MD   Principal Problem: Left ventricular assist device (LVAD) complication    Subjective:     Hospital/ICU Course:  No notes on file    Interval History/Significant Events: No acute events overnight. Improved oxygenation with CPAP. Continues to be intermittently febrile. Sputum cultures with G- rods, UA with many bacteria and WBCs.     Follow-up For: Procedure(s) (LRB):  REMOVAL, FOREIGN BODY (N/A)    Post-Operative Day: 6 Days Post-Op    Objective:     Vital Signs (Most Recent):  Temp: 98.78 °F (37.1 °C) (07/28/22 0630)  Pulse: 81 (07/28/22 0630)  Resp: (!) 28 (07/28/22 0600)  BP: (!) 85/0 (07/28/22 0300)  SpO2: 96 % (Per ABG) (07/28/22 0400)   Vital Signs (24h Range):  Temp:  [97.88 °F (36.6 °C)-101.66 °F (38.7 °C)] 98.78 °F (37.1 °C)  Pulse:  [] 81  Resp:  [18-30] 28  SpO2:  [91 %-100 %] 96 %  BP: (80-86)/(0) 85/0  Arterial Line BP: ()/(50-96) 82/61     Weight: 104.8 kg (231 lb)  Body mass index is 30.48 kg/m².      Intake/Output Summary (Last 24 hours) at 7/28/2022 0723  Last data filed at 7/28/2022 0701  Gross per 24 hour   Intake 2105.91 ml   Output 4734 ml   Net -2628.09 ml       Physical Exam  Constitutional:       General: He is not in acute distress.  HENT:      Head: Normocephalic and atraumatic.      Nose:      Comments: NG in place  Eyes:      Pupils: Pupils are equal, round, and reactive to light.   Neck:      Comments: R IJ CVC    Cardiovascular:      Rate and Rhythm: Normal rate. Rhythm irregular.      Comments: LVAD in place -- 6400 rpm, 5.0L    Midline sternotomy c/d with dressing in place      Pulmonary:      Comments: On comfort flow. Persistent tachypnea.      Abdominal:      General: There is no  distension.      Palpations: Abdomen is soft.      Comments: Prior driveline site packed with iodoform gauze   Genitourinary:     Comments: Lagunas in place draining clear urine  Musculoskeletal:      Comments: ECMO cannula sites with dressing in place.     Cutdown incision with seroma with wound vac in place, serous output. Changed yesterday    right radial arterial line   Skin:     General: Skin is warm and dry.   Neurological:      General: No focal deficit present.      Mental Status: He is alert and oriented to person, place, and time.       Vents:  Vent Mode: Spont (07/26/22 1106)  Ventilator Initiated: Yes (07/15/22 1027)  Set Rate: 16 BPM (07/26/22 0732)  Vt Set: 450 mL (07/26/22 0732)  Pressure Support: 10 cmH20 (07/26/22 1106)  PEEP/CPAP: 5 cmH20 (07/26/22 1106)  Oxygen Concentration (%): 80 (07/28/22 0600)  Peak Airway Pressure: 16 cmH2O (07/26/22 1106)  Plateau Pressure: 21 cmH20 (07/26/22 1106)  Total Ve: 13.8 mL (07/26/22 1106)  Negative Inspiratory Force (cm H2O): -27 (07/26/22 1222)  F/VT Ratio<105 (RSBI): (!) 59.09 (07/26/22 0706)    Lines/Drains/Airways       Central Venous Catheter Line  Duration             Percutaneous Central Line Insertion/Assessment - Quad Lumen  07/21/22 1515 right internal jugular 6 days              Drain  Duration                  NG/OG Tube 07/15/22 1030 Left nostril 12 days         Urethral Catheter 07/27/22 1536 Temperature probe 16 Fr. <1 day              Arterial Line  Duration             Arterial Line 07/13/22 2000 Right Radial 14 days              Line  Duration                  VAD 07/13/22 1300 Left ventricular assist device HeartMate 3 14 days              Peripheral Intravenous Line  Duration                  Midline Catheter Insertion/Assessment  - Single Lumen 07/21/22 1616 Left basilic vein (medial side of arm) 18g x 10cm 6 days                    Significant Labs:    CBC/Anemia Profile:  Recent Labs   Lab 07/27/22  1130 07/27/22  1941 07/28/22  0302   WBC  21.50* 23.69* 23.80*   HGB 7.8* 7.8* 7.7*   HCT 24.5* 25.2* 24.9*   * 488* 465*   MCV 93 95 93   RDW 21.6* 21.4* 21.7*        Chemistries:  Recent Labs   Lab 07/27/22  1130 07/27/22  1941 07/28/22  0052 07/28/22  0302    142  --  141   K 4.0 3.7 3.6 3.6    106  --  106   CO2 23 22*  --  20*   BUN 38* 46*  --  47*   CREATININE 1.6* 1.8*  --  1.7*   CALCIUM 9.3 9.5  --  9.5   ALBUMIN 2.3* 2.3*  --  2.2*   PROT 6.9 7.0  --  7.0   BILITOT 9.4* 9.2*  --  8.9*   ALKPHOS 143* 140*  --  136*   ALT 51* 51*  --  49*   AST 84* 76*  --  67*   MG 2.5 2.3 2.6 2.5   PHOS 3.8 4.2  --  4.3       ABGs:   Recent Labs   Lab 07/28/22  0352   PH 7.380   PCO2 41.3   HCO3 24.4   POCSATURATED 96   BE -1     All pertinent labs within the past 24 hours have been reviewed.    Significant Imaging:  I have reviewed all pertinent imaging results/findings within the past 24 hours.    Assessment/Plan:     Chronic combined systolic and diastolic heart failure  Tim Richards is a 56 y.o. male HFrEF with an EF of 10% and a history of HM2 LVAD in 2018 who is now s/p HM3 upgrade, initiation of V-A ECMO after failure to wean off bypass x2      Neuro/Psych:   -- Sedation: none  -- Pain: PRN Dilaudid  -- PRN hydroxyzine              Cards:   -- S/P LVAD exchange on 7/14/22  --Improving pressor requirements, wean gwen for MAP > 75, keep other pressors the same   Epi off   Levo off   Vaso 0.04   Giapreza off  -- Continue Midodrine 15 mg Q8  -- Stress dose steroids complete  -- MAP goal 75  -- VAD  flows stable  -- Decannulated on 7/19  -- Sternal closure on 7/15  -- Bedside drive line removal on 7/23  -- EP consult due to v-tach  -- Continue lidocaine, continue amio ggt, will consider restarting mexiletine  -- Echo today      Pulm:   -- Goal O2 sat > 90%  -- ABG PRN  -- Chest tubes removed  -- Nitric weaned off, consider restarting  -- Extubated 7/26, now on comfort flow wean as tolerated  -- Monitor O2 sat with ABGs       Renal:  -- Keep sun for strict I/O  -- Trend BUN/Cr  -- Lasix gtt 2.5/hr  -- Attempt to be net even for the day      FEN / GI:   -- Replace lytes as needed  -- Nutrition: NPO, holding TF for now due to respiratory status  -- GI ppx: PPI  -- Bowel reg: miralax, docusate, mag citrate      ID:   -- Tm: febrile; WBC increasing 23.8 from 22.3  -- ABX cefepime, falgyl  -- Cultures sent, repeat cultures sent 7/22, no growth to date, repeat cultures sent 7/26 --> gram negative rods on sputum cultures  -- BAL GNR --> pansensitive klebsiella   -- ID consult   -- daily vanc levels  -- Line exchange 7/21  -- UA with many bacteria and WBCs, culture pending  -- consider CT scan due to ongoing intermittent fevers and increasing WBC      Heme/Onc:   -- H/H stable   -- Daily CBC  -- Received 18 pRBCs, 16 FFP, 2 plt, 25 cryo; 1500 albumin intra-op  -- Heparin gtt at 2500, goal anti Xa 0.3  -- Warfarin 4mg      Endo:   -- BG goal 140-180  -- SSI  -- Levothyroxine      PPx:   Feeding: NPO, TF  Analgesia/Sedation: Precedex / PRN Dilaudid  Thromboembolic prevention: SCDs, heparin gtt  HOB >30: Yes  Stress Ulcer ppx: PPI  Glucose control: Critical care goal 140-180 g/dl, ISS     Lines/Drains/Airway: NG; RIJ CVC, r-radial a-line, sun; WV, VAD; midline      Dispo/Code Status/Palliative:   -- SICU / Full Code.              Dang Amezcua MD  Critical Care - Surgery  [facility]

## 2022-07-28 NOTE — PT/OT/SLP PROGRESS
Physical Therapy  Co-Treatment with OT/Change Plan of Care    Patient Name:  Tim Richards   MRN:  [id number]    Recommendations:     Discharge Recommendations:  rehabilitation facility   Discharge Equipment Recommendations:  (will determine DME needs closer to discharge)   Barriers to discharge: Decreased caregiver support family will not be able to assist at current functional level.     Assessment:     Tim Richards is a 56 y.o. male admitted with a medical diagnosis of Left ventricular assist device (LVAD) complication.  He presents with the following impairments/functional limitations:  weakness, impaired endurance, impaired functional mobility, gait instability, impaired balance, decreased safety awareness, decreased lower extremity function, decreased coordination pt tolerated treatment well but seem more tired today which decreased functional mobility. Pt c/o SOB with treatment. Pt will benefit from cont skilled PT 5x/wk to progress physically. Pt will need inpt rehab when medically stable to maximize rehab potential.     Rehab Prognosis: Good; patient would benefit from acute skilled PT services to address these deficits and reach maximum level of function.    Recent Surgery: Procedure(s) (LRB):  REMOVAL, FOREIGN BODY (N/A) 6 Days Post-Op    Plan:     During this hospitalization, patient to be seen 5 x/week to address the identified rehab impairments via gait training, therapeutic activities, therapeutic exercises, neuromuscular re-education and progress toward the following goals:    · Plan of Care Expires:  08/20/22    Subjective     Chief Complaint: pt c/o feeling weak and SOB with treatment.   Patient/Family Comments/goals: to get better and go home.   Pain/Comfort:  · Pain Rating 1: 0/10  · Pain Rating Post-Intervention 1: 0/10      Objective:     Communicated with nurse prior to session.  Patient found supine with telemetry, arterial line, wound vac, LVAD, oxygen, central line, PICC line, sun  catheter, NG tube upon PT entry to room.     General Precautions: Standard, LVAD, fall, sternal   Orthopedic Precautions:N/A   Braces:    Respiratory Status: Comfort flow, flow 40 L/min, concentration 84%     Functional Mobility:  · Bed Mobility:  Pt needed verbal cues for functional mobility with sternal precautions.    · Rolling Left:  moderate assistance  · Rolling Right: moderate assistance  · Supine to Sit: moderate assistance  · Sit to Supine: maximal assistance  ·   · Balance: pt sat on EOB x 6 m in with max assist. pt leaned posteriorly and to L side with sitting. PT provided verbal cues for midline posture of trunk and to hold head upright. PT provided tactile cues at trunk to shift center of gravtiy over base of support.     Due to pt complex medical condition, the skill of 2 licensed therapists is needed to maximize treatment session and progression towards goals      AM-PAC 6 CLICK MOBILITY  Turning over in bed (including adjusting bedclothes, sheets and blankets)?: 2  Sitting down on and standing up from a chair with arms (e.g., wheelchair, bedside commode, etc.): 1  Moving from lying on back to sitting on the side of the bed?: 2  Moving to and from a bed to a chair (including a wheelchair)?: 2  Need to walk in hospital room?: 1  Climbing 3-5 steps with a railing?: 1  Basic Mobility Total Score: 9       Therapeutic Activities and Exercises:   pt received verbal instruction in PT POC and verbally expressed understanding of such.     Patient left supine with all lines intact, call button in reach and RN and speech therapist present..    GOALS:   Multidisciplinary Problems     Physical Therapy Goals        Problem: Physical Therapy    Goal Priority Disciplines Outcome Goal Variances Interventions   Physical Therapy Goal     PT, PT/OT Ongoing, Progressing     Description: Goals to be met by: 22    Patient will increase functional independence with mobility by performin. Supine to sit with  MInimal Assistance -not met  2. Sit to stand transfer with Contact Guard Assistance -not met  3. Gait  x 150 feet with Contact Guard Assistance with approved assistive device -not met  4. Ascend/descend 8 stair with right Handrails Contact Guard Assistance -not met  5. Sitting at edge of bed x15 minutes with Contact Guard Assistance to improve tolerance to activities. -not met  6. Lower extremity exercise program x15 reps with assistance as needed -not met               Multidisciplinary Problems (Resolved)        Problem: Physical Therapy    Goal Priority Disciplines Outcome Goal Variances Interventions   Physical Therapy Goal   (Resolved)     PT, PT/OT Met     Description: The patient is near his functional baseline, he ambulated within the room with no AD and independence. Unable to do stair training, assess gait endurance due to COVID restrictions. He is safe to return home with no therapy needs. He is in agreement with PT discharge, he states he is confident he can ascend/descend his stairs.           Problem: Physical Therapy    Goal Priority Disciplines Outcome Goal Variances Interventions   Physical Therapy Goal   (Resolved)     PT, PT/OT Met                     Time Tracking:     PT Received On: 07/28/22  PT Start Time: 0808     PT Stop Time: 0831  PT Total Time (min): 23 min     Billable Minutes: Neuromuscular Re-education 23 min    Treatment Type: Treatment  PT/PTA: PT     PTA Visit Number: 0     07/28/2022

## 2022-07-28 NOTE — ASSESSMENT & PLAN NOTE
Tim Richards is a 56 y.o. male HFrEF with an EF of 10% and a history of HM2 LVAD in 2018 who is now s/p HM3 upgrade, initiation of V-A ECMO after failure to wean off bypass x2      Neuro/Psych:   -- Sedation: none  -- Pain: PRN Dilaudid  -- PRN hydroxyzine              Cards:   -- S/P LVAD exchange on 7/14/22  --Improving pressor requirements, wean gwen for MAP > 75, keep other pressors the same   Epi off   Levo off   Vaso 0.04   Giapreza off  -- Continue Midodrine 15 mg Q8  -- Stress dose steroids complete  -- MAP goal 75  -- VAD  flows stable  -- Decannulated on 7/19  -- Sternal closure on 7/15  -- Bedside drive line removal on 7/23  -- EP consult due to v-tach  -- Continue lidocaine, continue amio ggt, will consider restarting mexiletine  -- Echo today      Pulm:   -- Goal O2 sat > 90%  -- ABG PRN  -- Chest tubes removed  -- Nitric weaned off, consider restarting  -- Extubated 7/26, now on comfort flow wean as tolerated  -- Monitor O2 sat with ABGs      Renal:  -- Keep sun for strict I/O  -- Trend BUN/Cr  -- Lasix gtt 2.5/hr  -- Attempt to be net even for the day      FEN / GI:   -- Replace lytes as needed  -- Nutrition: NPO, holding TF for now due to respiratory status  -- GI ppx: PPI  -- Bowel reg: miralax, docusate, mag citrate      ID:   -- Tm: febrile; WBC increasing 23.8 from 22.3  -- ABX cefepime, falgyl  -- Cultures sent, repeat cultures sent 7/22, no growth to date, repeat cultures sent 7/26 --> gram negative rods on sputum cultures  -- BAL GNR --> pansensitive klebsiella   -- ID consult   -- daily vanc levels  -- Line exchange 7/21  -- UA with many bacteria and WBCs, culture pending  -- consider CT scan due to ongoing intermittent fevers and increasing WBC      Heme/Onc:   -- H/H stable   -- Daily CBC  -- Received 18 pRBCs, 16 FFP, 2 plt, 25 cryo; 1500 albumin intra-op  -- Heparin gtt at 2500, goal anti Xa 0.3  -- Warfarin 4mg      Endo:   -- BG goal 140-180  -- SSI  -- Levothyroxine       PPx:   Feeding: NPO, TF  Analgesia/Sedation: Precedex / PRN Dilaudid  Thromboembolic prevention: SCDs, heparin gtt  HOB >30: Yes  Stress Ulcer ppx: PPI  Glucose control: Critical care goal 140-180 g/dl, ISS     Lines/Drains/Airway: NG; RIJ CVC, r-radial a-line, sun; [place], VAD; midline      Dispo/Code Status/Palliative:   -- SICU / Full Code.

## 2022-07-28 NOTE — CONSULTS
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 Underwent LVAD pump exchange. Post-op course complicated by worsening cardiogenic shock/RV failure requiring VA-ECMO. Improved clinically underwent successful decannulation. Continued episodes of VT with ICD shock 7/21 and ongoing anti-arrhythmic mediation adjustments. TFTs on 7/27 significant for undetectable TSH and elevated fT4.     Endocrinology was consulted for hyperthyroidism.       With regards to amiodarone induced hypothyroidism:  Last seen outpatient by Dr Piedra on 3/29/2022.    He initially developed amiodarone-induced hyperthyroidism (Type 2 AIT) in 7/2019. He required a prolonged course of PDN and MMI, then subsequently developed hypothyroidism in 5/2020. LT4 was adjusted based on serial TFT measurements. Most recently levothyroxine dose has been 112 mcg.     He self-decreased his amiodarone dose to 200 mg daily about 6 months ago. T4 was high on 7/13, but he was continued on levothyroxine 112 mcg.      Medications and/or Treatments Impacting Glycemic Control:  Immunotherapy:    Immunosuppressants     None        Steroids:   Hormones (From admission, onward)            Start     Stop Route Frequency Ordered    07/28/22 1545  predniSONE tablet 40 mg         -- PER NG TUBE Daily 07/28/22 1542    07/15/22 0900  vasopressin (PITRESSIN) 1 Units/mL in dextrose 5 % 100 mL infusion         -- IV Continuous 07/15/22 0900        Pressors:    Autonomic Drugs (From admission, onward)            Start     Stop Route Frequency Ordered     07/23/22 1400  midodrine tablet 15 mg         -- PER NG TUBE Every 8 hours 07/23/22 0944    07/15/22 0900  NORepinephrine 8 mg in dextrose 5% 250 mL infusion        Question Answer Comment   Begin at (in mcg/kg/min): 0.1    Titrate by: (in mcg/kg/min) 0.02    Titrate interval: (in minutes) 20    Titrate to maintain: (MAP or SBP) MAP    Greater than: (in mmHg) 65    Maximum dose: (in mcg/kg/min) 0.2        -- IV Continuous 07/15/22 0900          Medications Prior to Admission   Medication Sig Dispense Refill Last Dose    allopurinoL (ZYLOPRIM) 100 MG tablet TAKE 1 TABLET (100 MG) BY MOUTH NIGHTLY. 90 tablet 3 6/26/2022 at Unknown time    amiodarone (PACERONE) 200 MG Tab Take 2 tablets (400 mg total) by mouth once daily. 180 tablet 3 6/26/2022 at Unknown time    amLODIPine (NORVASC) 10 MG tablet TAKE 1 TABLET BY MOUTH EVERY DAY 90 tablet 3 6/26/2022 at Unknown time    aspirin (ECOTRIN) 81 MG EC tablet Take 1 tablet (81 mg total) by mouth once daily. 30 tablet 11 6/26/2022 at Unknown time    busPIRone (BUSPAR) 5 MG Tab Take 1 tablet (5 mg total) by mouth 3 (three) times daily. 90 tablet 1 6/26/2022 at Unknown time    levothyroxine (SYNTHROID) 112 MCG tablet Take 1 tablet (112 mcg total) by mouth before breakfast. 90 tablet 3 6/26/2022 at Unknown time    mexiletine (MEXITIL) 250 MG Cap Take 1 capsule (250 mg total) by mouth every 8 (eight) hours. 270 capsule 3 6/26/2022 at Unknown time    pantoprazole (PROTONIX) 40 MG tablet TAKE 1 TABLET (40 MG TOTAL) BY MOUTH DAILY WITH LUNCH. 90 tablet 3 6/26/2022 at Unknown time    warfarin (COUMADIN) 5 MG tablet TAKE 7.5 MG ORALLY DAILY EVERY SUNDAY AND THURSDAY, TAKE 5 MG ORALLY DAILY ON OTHER DAYS 135 tablet 3 6/26/2022 at Unknown time    [DISCONTINUED] ALPRAZolam (XANAX) 1 MG tablet Take 1 tablet (1 mg total) by mouth daily as needed for Anxiety. Bid prn 30 tablet 1 6/26/2022 at Unknown time    [DISCONTINUED] zolpidem (AMBIEN CR) 12.5 MG CR tablet Take 1 tablet (12.5  mg total) by mouth nightly as needed for Insomnia. 30 tablet 5 6/26/2022 at Unknown time       Current Facility-Administered Medications   Medication Dose Route Frequency Provider Last Rate Last Admin    0.9%  NaCl infusion (for blood administration)   Intravenous Q24H PRN Dang Amezcua MD        0.9%  NaCl infusion   Intravenous PRN Yg Kaufman MD   Stopped at 07/28/22 0926    0.9%  NaCl infusion   Intravenous PRN Yg Kaufman MD   Stopped at 07/25/22 0035    acetaminophen oral solution 999.0148 mg  999.0148 mg Per NG tube Q8H PRN Dang Amezcua MD   999.0148 mg at 07/27/22 1623    acetylcysteine 100 mg/ml (10%) solution 4 mL  4 mL Nebulization Q6H Yg Kaufman MD   4 mL at 07/28/22 1313    amiodarone 360 mg/200 mL (1.8 mg/mL) infusion  0.25 mg/min Intravenous Continuous Dang Amezcua MD 8.3 mL/hr at 07/28/22 1700 0.25 mg/min at 07/28/22 1700    amiodarone tablet 400 mg  400 mg Oral TID Dang Amezcua MD   400 mg at 07/28/22 1420    aspirin chewable tablet 81 mg  81 mg Per OG tube Daily Josefina Mcclure PA-C   81 mg at 07/28/22 0814    bisacodyL suppository 10 mg  10 mg Rectal Daily PRN Hardeep Biswas MD   10 mg at 07/27/22 1750    cefepime in dextrose 5 % IVPB 2 g  2 g Intravenous Q12H Dang Amezcua MD   Stopped at 07/28/22 0904    dextrose 10% bolus 125 mL  12.5 g Intravenous PRN Angelo Avina MD        dextrose 10% bolus 250 mL  25 g Intravenous PRN Angelo Avina MD        furosemide (LASIX) 200 mg in dextrose 5 % 100 mL continuous infusion (conc: 2 mg/mL)  7.5 mg/hr Intravenous Continuous Dang Amezcua MD 3.75 mL/hr at 07/28/22 1700 7.5 mg/hr at 07/28/22 1700    guaiFENesin 100 mg/5 ml syrup 300 mg  300 mg Per NG tube Q6H Yg Kaufman MD   300 mg at 07/28/22 1156    heparin 25,000 units in dextrose 5% 250 mL (100 units/mL) infusion (heparin infusion - NO NOMOGRAM)  2,200 Units/hr Intravenous Continuous Dang Amezcua MD 22 mL/hr at 07/28/22 1700 2,200 Units/hr at 07/28/22 1700     HYDROmorphone injection 0.5 mg  0.5 mg Intravenous Q4H PRN Temitope Butts MD   0.5 mg at 07/27/22 1652    HYDROmorphone injection 1 mg  1 mg Intravenous Q4H PRN Temitope Butts MD   1 mg at 07/27/22 0720    hydrOXYzine HCL tablet 25 mg  25 mg Per NG tube TID PRN Dang Amezcua MD   25 mg at 07/27/22 2115    levalbuterol nebulizer solution 0.63 mg  0.63 mg Nebulization Q6H Dang Amezcua MD        LIDOcaine 2000 mg in D5W 250 mL infusion  0.25 mg/min Intravenous Continuous Dang Amezcua MD 1.9 mL/hr at 07/28/22 1700 0.25 mg/min at 07/28/22 1700    lipid (SMOFLIPID) (SMOFLIPID) 20 % infusion 250 mL  250 mL Intravenous Daily Dang Amezcua MD        magnesium citrate solution 148 mL  148 mL Oral Once Dang Amezcua MD        [START ON 7/29/2022] methIMAzole tablet 20 mg  20 mg Per NG tube BID María Briec MD        metroNIDAZOLE tablet 500 mg  500 mg Oral Q8H Dang Amezcua MD   500 mg at 07/28/22 1420    mexiletine capsule 400 mg  400 mg Oral Q8H Dang Amezcua MD   400 mg at 07/28/22 1421    midodrine tablet 15 mg  15 mg Per NG tube Q8H Hardeep Biswas MD   15 mg at 07/28/22 1420    nitric oxide gas Gas 5 ppm  5 ppm Inhalation Continuous Dang Amezcua MD   5 ppm at 07/28/22 1130    NORepinephrine 8 mg in dextrose 5% 250 mL infusion  0-3 mcg/kg/min Intravenous Continuous Dang Amezcua MD   Stopped at 07/27/22 2015    pantoprazole injection 40 mg  40 mg Intravenous BID Dang Amezcua MD   40 mg at 07/28/22 0815    polyethylene glycol packet 17 g  17 g Per NG tube Daily Hardeep Biswas MD   17 g at 07/28/22 0814    predniSONE tablet 40 mg  40 mg Per NG tube Daily María Brice MD   40 mg at 07/28/22 1608    TPN ADULT CENTRAL LINE CUSTOM   Intravenous Continuous Dang Amezcua MD        vasopressin (PITRESSIN) 1 Units/mL in dextrose 5 % 100 mL infusion  0.04 Units/min Intravenous Continuous Dang Amezcua MD 2.4 mL/hr at 07/28/22 1700 0.04 Units/min at 07/28/22 1700    warfarin (COUMADIN) tablet 4 mg  4  mg Oral Daily Dang Amezcua MD   4 mg at 07/28/22 1608       Interval HPI:   Overnight events:  Eating:   NPO  Nausea: No  Hypoglycemia and intervention: No  Fever: Yes  TPN and/or TF: Yes  If yes, type of TF/TPN and rate: Peptamen 1.5 with Prebio at 30 mL/hr    PMH, PSH, FH, SH updated and reviewed     ROS:  Review of Systems   Unable to perform ROS: Severe respiratory distress and critical illness     Labs Reviewed and Include:  BASELINE Creatinine: 1.9    Lab Results   Component Value Date    WBC 24.99 (H) 07/28/2022    HGB 7.8 (L) 07/28/2022    HCT 25.5 (L) 07/28/2022    MCV 96 07/28/2022     (H) 07/28/2022     Recent Labs   Lab 07/28/22  1132      CALCIUM 9.2   ALBUMIN 2.3*   PROT 6.4      K 4.5   CO2 23      BUN 49*   CREATININE 2.1*   ALKPHOS 134   ALT 50*   AST 62*   BILITOT 8.9*     Lab Results   Component Value Date    HGBA1C 5.4 04/26/2021       Nutritional status:   Body mass index is 30.48 kg/m².  Lab Results   Component Value Date    ALBUMIN 2.3 (L) 07/28/2022    ALBUMIN 2.2 (L) 07/28/2022    ALBUMIN 2.3 (L) 07/27/2022     Lab Results   Component Value Date    PREALBUMIN 11 (L) 07/17/2022    PREALBUMIN 10 (L) 07/15/2022    PREALBUMIN 20 05/19/2022       Estimated Creatinine Clearance: 50.5 mL/min (A) (based on SCr of 2.1 mg/dL (H)).    POCT Glucose results: 100s-119    Current Insulin Regimen: none       PHYSICAL EXAMINATION:  Vitals:    07/28/22 1630   BP:    Pulse: 79   Resp: (!) 42   Temp: 99.14 °F (37.3 °C)     Body mass index is 30.48 kg/m².    Physical Exam  Constitutional:       General: He is in acute distress.      Appearance: He is ill-appearing.   HENT:      Head: Normocephalic.      Mouth/Throat:      Mouth: Mucous membranes are moist.   Eyes:      General: Scleral icterus present.   Cardiovascular:      Rate and Rhythm: Normal rate.      Comments: LVAD in place  Pulmonary:      Effort: Tachypnea, accessory muscle usage and respiratory distress present.    Abdominal:      General: There is no distension.      Palpations: Abdomen is soft.   Musculoskeletal:         General: No swelling.      Cervical back: Normal range of motion.   Neurological:      General: No focal deficit present.      Mental Status: He is alert and oriented to person, place, and time.     .     ASSESSMENT and PLAN:    Amiodarone-induced hyperthyroidism  -Pt with hx of Amiodarone induced hyperthyroidism type 2, then developed Hypothyroidism.  - TRAb and TSI - negative. US unremarkable with low vascularity.   - After previous presentation of AIT, he was weaned off methimazole and prednisone by 5/2020  - Then developed hypothyroidism, LT4 started and dose titrated per TFTs    - Prior to current admission he was on LT4 112 mcg qd  - Labs c/w hypothyroidism until current admission, initially on 7/13, with low TSH and elevated fT4. Repeat TFTs on 7/27 with worsening hyperthyroidism. He continued to receive LT4 112 mcg through 7/28.  - He remains on amiodarone for episodes of VT  - Suspect current hyperthyroidism is AIT, however continued exogenous LT4 exacerbated/contributed to current presentation       Recommendations:  - Although mechanism unknown, strongly suspect AIT (type 1 or 2) -- will start treatment for AIT type 2 empirically due to clinical implications in delayed treatment. Ultimately steroids and methimazole would be weaned based on response   - Methimazole 20mg BID and Prednisone 40mg qd  - Daily fT4 and T3  - We discontinued levothyroxine    amiodarone induced hypothyrodism  After previous AIT he becme hypothyroid and required thyroid replacement   Levothyroxine discontinued on 7/28/2022    LVAD (left ventricular assist device) present  LVAD in place, continues to have runs of VT and remains on amiodarone  Management by primary      High risk medications (amiodarone) long-term use  Will need close monitoring of thyroid function indefinitely as he remains reliant on amiodarone          DISCHARGE NEEDS: will assess daily    María Brice MD  Endocrinology  [facility]

## 2022-07-28 NOTE — SUBJECTIVE & OBJECTIVE
"Interval History: "OK. Trying to catch my breath". Patient remains intubated and febrile. BAL culture on 7/20 now with K aerogenes. Blood cultures remain NGTD. Yellow mucoid drainage noted at DLES. Brown yellow secretions at prior chest tube exit site. Transitioned from meropenem to cefepime on 7/25.     Extubated on 7/26. Febrile to 101.6 F on 7/27. Repeat sputum culture with K aerogenes susceptible to cefepime. Metronidazole added on 7/27. Fever curve down trending.     Review of Systems   Unable to perform ROS: Severe respiratory distress   Objective:     Vital Signs (Most Recent):  Temp: 99.68 °F (37.6 °C) (07/28/22 1030)  Pulse: 80 (07/28/22 1030)  Resp: (!) 43 (07/28/22 1030)  BP: (!) 76/0 (07/28/22 0800)  SpO2: (!) 91 % (07/28/22 0800)   Vital Signs (24h Range):  Temp:  [97.88 °F (36.6 °C)-101.48 °F (38.6 °C)] 99.68 °F (37.6 °C)  Pulse:  [] 80  Resp:  [18-54] 43  SpO2:  [91 %-100 %] 91 %  BP: (76-86)/(0) 76/0  Arterial Line BP: ()/(45-96) 79/67     Weight: 104.8 kg (231 lb)  Body mass index is 30.48 kg/m².    Estimated Creatinine Clearance: 62.4 mL/min (A) (based on SCr of 1.7 mg/dL (H)).    Physical Exam  Vitals and nursing note reviewed. Exam conducted with a chaperone present.   Constitutional:       General: He is sleeping.      Appearance: He is ill-appearing.   HENT:      Head: Normocephalic and atraumatic.   Eyes:      General: Scleral icterus present.         Right eye: No discharge.         Left eye: No discharge.      Conjunctiva/sclera: Conjunctivae normal.   Cardiovascular:      Pulses: Normal pulses.      Comments: Distant VAD Humming sounds  Pulmonary:      Effort: Tachypnea and accessory muscle usage present. No respiratory distress.      Breath sounds: No stridor. Rhonchi present. No wheezing or rales.   Abdominal:      General: Bowel sounds are decreased. There is no distension.      Palpations: Abdomen is soft.      Tenderness: There is no abdominal tenderness.          Comments: " Left sided DLES covered with gauze dressing. Prior chest tube sites covered with gauze dressing.     Musculoskeletal:         General: Normal range of motion.   Skin:     General: Skin is warm and dry.   Neurological:      General: No focal deficit present.      Mental Status: He is easily aroused.      Comments: Awakens with verbal stimuli. Shakes head for yes/no questions.        Significant Labs: Blood Culture:   Recent Labs   Lab 07/20/22  1450 07/22/22  0857 07/22/22  0918 07/26/22  1200 07/26/22  1207   LABBLOO No growth after 5 days. No growth after 5 days. No growth after 5 days. No Growth to date  No Growth to date No Growth to date  No Growth to date       BMP:   Recent Labs   Lab 07/28/22  0302 07/28/22  0751   *  --      --    K 3.6 4.0     --    CO2 20*  --    BUN 47*  --    CREATININE 1.7*  --    CALCIUM 9.5  --    MG 2.5  --        CBC:   Recent Labs   Lab 07/27/22  1130 07/27/22  1941 07/28/22  0302   WBC 21.50* 23.69* 23.80*   HGB 7.8* 7.8* 7.7*   HCT 24.5* 25.2* 24.9*   * 488* 465*       Respiratory Culture:   Recent Labs   Lab 07/20/22  1120 07/26/22  1010   GSRESP Few WBC's  Rare Gram positive cocci  Rare Gram negative rods <10 epithelial cells per low power field.  Rare WBC's  No organisms seen   RESPIRATORYC  --  No S aureus or Pseudomonas isolated.  KLEBSIELLA AEROGENES  Rare  *       Urine Culture: No results for input(s): LABURIN in the last 4320 hours.  Urine Studies:   Recent Labs   Lab 07/27/22  1709   COLORU [place]   APPEARANCEUA Cloudy*   PHUR 5.0   SPECGRAV 1.010   PROTEINUA 1+*   GLUCUA Negative   KETONESU Negative   BILIRUBINUA Negative   OCCULTUA 3+*   NITRITE Negative   LEUKOCYTESUR Negative   RBCUA >100*   WBCUA 22*   BACTERIA Moderate*   SQUAMEPITHEL 1   HYALINECASTS 7*       Wound Culture:   Recent Labs   Lab 07/25/22  1503   LABAERO No growth         Significant Imaging: I have reviewed all pertinent imaging results/findings within the past 24  hours.

## 2022-07-28 NOTE — SUBJECTIVE & OBJECTIVE
Interval HPI:   Overnight events:  Eating:   NPO  Nausea: No  Hypoglycemia and intervention: No  Fever: Yes  TPN and/or TF: Yes  If yes, type of TF/TPN and rate: Peptamen 1.5 with Prebio at 30 mL/hr    PMH, PSH, FH, SH updated and reviewed     ROS:  Review of Systems   Unable to perform ROS: Severe respiratory distress and critical illness     Labs Reviewed and Include:  BASELINE Creatinine: 1.9    Lab Results   Component Value Date    WBC 24.99 (H) 07/28/2022    HGB 7.8 (L) 07/28/2022    HCT 25.5 (L) 07/28/2022    MCV 96 07/28/2022     (H) 07/28/2022     Recent Labs   Lab 07/28/22  1132      CALCIUM 9.2   ALBUMIN 2.3*   PROT 6.4      K 4.5   CO2 23      BUN 49*   CREATININE 2.1*   ALKPHOS 134   ALT 50*   AST 62*   BILITOT 8.9*     Lab Results   Component Value Date    HGBA1C 5.4 04/26/2021       Nutritional status:   Body mass index is 30.48 kg/m².  Lab Results   Component Value Date    ALBUMIN 2.3 (L) 07/28/2022    ALBUMIN 2.2 (L) 07/28/2022    ALBUMIN 2.3 (L) 07/27/2022     Lab Results   Component Value Date    PREALBUMIN 11 (L) 07/17/2022    PREALBUMIN 10 (L) 07/15/2022    PREALBUMIN 20 05/19/2022       Estimated Creatinine Clearance: 50.5 mL/min (A) (based on SCr of 2.1 mg/dL (H)).    POCT Glucose results: 100s-119    Current Insulin Regimen: none       PHYSICAL EXAMINATION:  Vitals:    07/28/22 1630   BP:    Pulse: 79   Resp: (!) 42   Temp: 99.14 °F (37.3 °C)     Body mass index is 30.48 kg/m².    Physical Exam  Constitutional:       General: He is in acute distress.      Appearance: He is ill-appearing.   HENT:      Head: Normocephalic.      Mouth/Throat:      Mouth: Mucous membranes are moist.   Eyes:      General: Scleral icterus present.   Cardiovascular:      Rate and Rhythm: Normal rate.      Comments: LVAD in place  Pulmonary:      Effort: Tachypnea, accessory muscle usage and respiratory distress present.   Abdominal:      General: There is no distension.      Palpations:  Abdomen is soft.   Musculoskeletal:         General: No swelling.      Cervical back: Normal range of motion.   Neurological:      General: No focal deficit present.      Mental Status: He is alert and oriented to person, place, and time.

## 2022-07-28 NOTE — SUBJECTIVE & OBJECTIVE
Interval History/Significant Events: No acute events overnight. Improved oxygenation with CPAP. Continues to be intermittently febrile. Sputum cultures with G- rods, UA with many bacteria and WBCs.     Follow-up For: Procedure(s) (LRB):  REMOVAL, FOREIGN BODY (N/A)    Post-Operative Day: 6 Days Post-Op    Objective:     Vital Signs (Most Recent):  Temp: 98.78 °F (37.1 °C) (07/28/22 0630)  Pulse: 81 (07/28/22 0630)  Resp: (!) 28 (07/28/22 0600)  BP: (!) 85/0 (07/28/22 0300)  SpO2: 96 % (Per ABG) (07/28/22 0400)   Vital Signs (24h Range):  Temp:  [97.88 °F (36.6 °C)-101.66 °F (38.7 °C)] 98.78 °F (37.1 °C)  Pulse:  [] 81  Resp:  [18-30] 28  SpO2:  [91 %-100 %] 96 %  BP: (80-86)/(0) 85/0  Arterial Line BP: ()/(50-96) 82/61     Weight: 104.8 kg (231 lb)  Body mass index is 30.48 kg/m².      Intake/Output Summary (Last 24 hours) at 7/28/2022 0723  Last data filed at 7/28/2022 0701  Gross per 24 hour   Intake 2105.91 ml   Output 4734 ml   Net -2628.09 ml       Physical Exam  Constitutional:       General: He is not in acute distress.  HENT:      Head: Normocephalic and atraumatic.      Nose:      Comments: NG in place  Eyes:      Pupils: Pupils are equal, round, and reactive to light.   Neck:      Comments: R IJ CVC    Cardiovascular:      Rate and Rhythm: Normal rate. Rhythm irregular.      Comments: LVAD in place -- 6400 rpm, 5.0L    Midline sternotomy c/d with dressing in place      Pulmonary:      Comments: On comfort flow. Persistent tachypnea.      Abdominal:      General: There is no distension.      Palpations: Abdomen is soft.      Comments: Prior driveline site packed with iodoform gauze   Genitourinary:     Comments: Lagunas in place draining clear urine  Musculoskeletal:      Comments: ECMO cannula sites with dressing in place.     Cutdown incision with seroma with wound vac in place, serous output. Changed yesterday    right radial arterial line   Skin:     General: Skin is warm and dry.    Neurological:      General: No focal deficit present.      Mental Status: He is alert and oriented to person, place, and time.       Vents:  Vent Mode: Spont (07/26/22 1106)  Ventilator Initiated: Yes (07/15/22 1027)  Set Rate: 16 BPM (07/26/22 0732)  Vt Set: 450 mL (07/26/22 0732)  Pressure Support: 10 cmH20 (07/26/22 1106)  PEEP/CPAP: 5 cmH20 (07/26/22 1106)  Oxygen Concentration (%): 80 (07/28/22 0600)  Peak Airway Pressure: 16 cmH2O (07/26/22 1106)  Plateau Pressure: 21 cmH20 (07/26/22 1106)  Total Ve: 13.8 mL (07/26/22 1106)  Negative Inspiratory Force (cm H2O): -27 (07/26/22 1222)  F/VT Ratio<105 (RSBI): (!) 59.09 (07/26/22 0706)    Lines/Drains/Airways       Central Venous Catheter Line  Duration             Percutaneous Central Line Insertion/Assessment - Quad Lumen  07/21/22 1515 right internal jugular 6 days              Drain  Duration                  NG/OG Tube 07/15/22 1030 Left nostril 12 days         Urethral Catheter 07/27/22 1536 Temperature probe 16 Fr. <1 day              Arterial Line  Duration             Arterial Line 07/13/22 2000 Right Radial 14 days              Line  Duration                  VAD 07/13/22 1300 Left ventricular assist device HeartMate 3 14 days              Peripheral Intravenous Line  Duration                  Midline Catheter Insertion/Assessment  - Single Lumen 07/21/22 1616 Left basilic vein (medial side of arm) 18g x 10cm 6 days                    Significant Labs:    CBC/Anemia Profile:  Recent Labs   Lab 07/27/22  1130 07/27/22 1941 07/28/22  0302   WBC 21.50* 23.69* 23.80*   HGB 7.8* 7.8* 7.7*   HCT 24.5* 25.2* 24.9*   * 488* 465*   MCV 93 95 93   RDW 21.6* 21.4* 21.7*        Chemistries:  Recent Labs   Lab 07/27/22  1130 07/27/22  1941 07/28/22  0052 07/28/22  0302    142  --  141   K 4.0 3.7 3.6 3.6    106  --  106   CO2 23 22*  --  20*   BUN 38* 46*  --  47*   CREATININE 1.6* 1.8*  --  1.7*   CALCIUM 9.3 9.5  --  9.5   ALBUMIN 2.3* 2.3*  --   2.2*   PROT 6.9 7.0  --  7.0   BILITOT 9.4* 9.2*  --  8.9*   ALKPHOS 143* 140*  --  136*   ALT 51* 51*  --  49*   AST 84* 76*  --  67*   MG 2.5 2.3 2.6 2.5   PHOS 3.8 4.2  --  4.3       ABGs:   Recent Labs   Lab 07/28/22  0352   PH 7.380   PCO2 41.3   HCO3 24.4   POCSATURATED 96   BE -1     All pertinent labs within the past 24 hours have been reviewed.    Significant Imaging:  I have reviewed all pertinent imaging results/findings within the past 24 hours.

## 2022-07-28 NOTE — PT/OT/SLP EVAL
Speech Language Pathology Evaluation  Bedside Swallow    Patient Name:  Tim Richards   MRN:  [id number]  Admitting Diagnosis: Left ventricular assist device (LVAD) complication    Recommendations:                 General Recommendations:  Dysphagia therapy  Diet recommendations:  NPO, NPO   Aspiration Precautions: Ice chips sparingly when awake and alert and requesting at the discretion of the medical team, no additional PO trials outside of speech therapy sessions   General Precautions: Standard, LVAD  Communication strategies:  none    History:     Past Medical History:   Diagnosis Date    Anticoagulant long-term use     CHF (congestive heart failure)     Class 1 obesity due to excess calories with serious comorbidity and body mass index (BMI) of 31.0 to 31.9 in adult     Dilated cardiomyopathy 1/10/2018    Disorder of kidney and ureter     CKD    Encounter for blood transfusion     Gout     HTN (hypertension)     Hx of psychiatric care     ICD (implantable cardioverter-defibrillator) infection 7/1/2020    Psychiatric problem     Thyroid disease     Ventricular tachycardia (paroxysmal)        Past Surgical History:   Procedure Laterality Date    AORTIC VALVULOPLASTY N/A 7/13/2022    Procedure: REPAIR, AORTIC VALVE;  Surgeon: Yg Kaufman MD;  [facility];  Service: Cardiovascular;  Laterality: N/A;    APPLICATION OF WOUND VACUUM-ASSISTED CLOSURE DEVICE N/A 7/15/2022    Procedure: APPLICATION, WOUND VAC;  Surgeon: Yg Kaufman MD;  [facility];  Service: Cardiovascular;  Laterality: N/A;  50 x 5 cm     CARDIAC CATHETERIZATION  Dec. 2012    CARDIAC DEFIBRILLATOR PLACEMENT Left     CRRT-D    COLONOSCOPY N/A 3/6/2018    Procedure: COLONOSCOPY;  Surgeon: Alonso Bone MD;  [facility]  Service: Endoscopy;  Laterality: N/A;    COLONOSCOPY N/A 7/17/2019    Procedure: COLONOSCOPY;  Surgeon: Blane Valdez MD;  [facility]  Service:  Endoscopy;  Laterality: N/A;    COLONOSCOPY N/A 7/18/2019    Procedure: COLONOSCOPY;  Surgeon: Blane Valdez MD;  Location: [facility] ENDO (2ND FLR);  Service: Endoscopy;  Laterality: N/A;    ESOPHAGOGASTRODUODENOSCOPY N/A 7/17/2019    Procedure: EGD (ESOPHAGOGASTRODUODENOSCOPY);  Surgeon: Blane Valdez MD;  Location: [facility] ENDO (2ND FLR);  Service: Endoscopy;  Laterality: N/A;    ESOPHAGOGASTRODUODENOSCOPY N/A 7/18/2019    Procedure: EGD (ESOPHAGOGASTRODUODENOSCOPY);  Surgeon: Blane Valdez MD;  Location: [facility] ENDO (2ND FLR);  Service: Endoscopy;  Laterality: N/A;    FOREIGN BODY REMOVAL N/A 7/22/2022    Procedure: REMOVAL, FOREIGN BODY;  Surgeon: Yg Kaufman MD;  Location: [facility] OR 2ND FLR;  Service: Cardiovascular;  Laterality: N/A;  LVAD Heartmate 2 drive line removal    INSERTION OF GRAFT TO PERICARDIUM N/A 7/15/2022    Procedure: INSERTION, GRAFT, PERICARDIUM;  Surgeon: Yg Kaufman MD;  Location: [facility] OR 2ND FLR;  Service: Cardiovascular;  Laterality: N/A;    IRRIGATION OF MEDIASTINUM N/A 7/15/2022    Procedure: IRRIGATION, MEDIASTINUM;  Surgeon: Yg Kaufman MD;  Location: [facility] OR 2ND FLR;  Service: Cardiovascular;  Laterality: N/A;    LYSIS OF ADHESIONS  7/13/2022    Procedure: LYSIS, ADHESIONS;  Surgeon: Yg Kaufman MD;  Location: [facility] OR 2ND FLR;  Service: Cardiovascular;;    NONINVASIVE CARDIAC ELECTROPHYSIOLOGY STUDY N/A 10/18/2019    Procedure: CARDIAC ELECTROPHYSIOLOGY STUDY, NONINVASIVE;  Surgeon: Raz Wagner MD;  Location: [facility] EP LAB;  Service: Cardiology;  Laterality: N/A;  VT, DFTs, MDT CRTD in situ, LVAD, LASHELL pizarro, 3098    REPLACEMENT OF IMPLANTABLE CARDIOVERTER-DEFIBRILLATOR (ICD) GENERATOR N/A 3/9/2020    Procedure: REPLACEMENT, ICD GENERATOR;  Surgeon: Harry Yun MD;  Location: [facility] EP LAB;  Service: Cardiology;  Laterality: N/A;  VT, ICD Gen Change and Lead Revision, MDT, MAC, DM,3 Prep    REPLACEMENT OF LEFT VENTRICULAR ASSIST DEVICE (LVAD)  7/13/2022    Procedure:  REPLACEMENT, LVAD;  Surgeon: Yg Kaufman MD;  Location: [facility] OR [facility] FLR;  Service: Cardiovascular;;    REPLACEMENT OF PUMP N/A 7/13/2022    Procedure: REPLACEMENT, PUMP;  Surgeon: Yg Kaufman MD;  Location: [facility] OR [facility] FLR;  Service: Cardiovascular;  Laterality: N/A;  LVAD pump exchange  EXPLANATION OF HEATMATE 2  IMPLANTATION OF HEARTMATE 3  IMPLANTATION OF 8MM CHIMNEY GRAFT TO RFA  INITIATION OF ECMO  TEMPORARY CLOSURE OF CHEST    REVISION OF IMPLANTABLE CARDIOVERTER-DEFIBRILLATOR (ICD) ELECTRODE LEAD PLACEMENT N/A 3/9/2020    Procedure: REVISION, INSERTION, ELECTRODE LEAD, ICD;  Surgeon: Harry Yun MD;  Location: [facility] EP LAB;  Service: Cardiology;  Laterality: N/A;  VT, ICD Gen Change and Lead Revision, MDT, MAC, DM,3 Prep    STERNAL WOUND CLOSURE N/A 7/14/2022    Procedure: CLOSURE, WOUND, STERNUM;  Surgeon: Yg Kaufman MD;  Location: [facility] OR [facility] FLR;  Service: Cardiovascular;  Laterality: N/A;  temporary closure  evacuation of hematoma    STERNAL WOUND CLOSURE N/A 7/15/2022    Procedure: CLOSURE, WOUND, STERNUM;  Surgeon: Yg Kaufman MD;  [facility];  Service: Cardiovascular;  Laterality: N/A;    TREATMENT OF CARDIAC ARRHYTHMIA  10/18/2019    Procedure: Cardioversion or Defibrillation;  Surgeon: Raz Wagner MD;  Location: [facility] EP LAB;  Service: Cardiology;;     Tim Richards is a 56 y.o. male HFrEF with an EF of 10% and a history of HM2 LVAD in 2018 who is now s/p HM3 upgrade, initiation of V-A ECMO after failure to wean off bypass x2    - S/P LVAD exchange on 7/14/22  -- Sternal closure on 7/15  --Decannulated on 7/19  -- Bedside drive line removal on 7/23  -- Extubated 7/26, now on comfort flow wean as tolerated      Prior Intubation HX:  7/    Modified Barium Swallow: none prior     Prior diet: currently NPO, previously regular unrestricted diet       Subjective     Awake and alert   finishing with PT and OT therapies upon arrival      Pain/Comfort:  · Pain Rating 1: 0/10  · Pain Rating Post-Intervention 1: 0/10    Respiratory Status: Comfort flow, flow 40 L/min, concentration 80%    Objective:     Oral Musculature Evaluation  · Oral Musculature: WFL, general weakness  · Dentition: present and adequate  · Oral Labial Strength and Mobility: WFL  · Lingual Strength and Mobility: WFL  · Volitional Cough: strong  · Volitional Swallow: prompt  · Voice Prior to PO Intake: strong and clear    Bedside Swallow Eval:   Consistencies Assessed:  · Thin liquids ice chips x3   · NG tub ein place  · Additional trials deferred 2/2 high levels of respiratory support and pt shortness of breath     Oral Phase:   · WFL    Pharyngeal Phase:   · no overt clinical signs/symptoms of aspiration however pt with increased WOB and evidently short of breath throughout assessment   · Tenuous respiratory status and high need for respiratory support limiting furhter PO trials or ability for SLP able to safely advance diet at this time     Treatment:  Education provided to Pt re: SLP role in acute care setting, overall impressions and therapeutic goals. Whiteboard updated.    SLP discussed with pt overall recommendations and limitations with PO at this time given high levels of respiratory supports. Pt to continue with NG tube to meet needs, frequent oral care and ice chips sparingly at the discretion of the medical team. Speech to continue to follow. no additional PO trials outside of speech therapy sessions       Assessment:     Tim Richards is a 56 y.o. male with an SLP diagnosis of Dysphagia.     Goals:   Multidisciplinary Problems     SLP Goals        Problem: SLP    Goal Priority Disciplines Outcome   SLP Goal     SLP                    Plan:     · Patient to be seen:  4 x/week   · Plan of Care expires:     · Plan of Care reviewed with:      · SLP Follow-Up:  Yes       Discharge recommendations:  rehabilitation facility   Barriers to Discharge:  Level of Skilled  Assistance Needed      Time Tracking:     SLP Treatment Date:   07/28/22  Speech Start Time:  0817  Speech Stop Time:  0830     Speech Total Time (min):  13 min    Billable Minutes: Eval Swallow and Oral Function 5 and Self Care/Home Management Training 8    07/28/2022

## 2022-07-28 NOTE — CARE UPDATE
MD notified of the following:  - PIs sustaining 1.7-2.0 for the past 2 hours  - UO sustaining 100-175 qh  - CVP 16-17  - BP MAPs sustaining 75-80 and off of vasopressors  - LVAD Flows now sustaining 5.8 and beginning of shift 5.6  - Lasix gtt at 10 mg/hr  - Net Negative: -438.6 (my shift)     -1589 in 24 hours    MD called Dr. Nicolas. Orders given to decrease lasix gtt to 5 mg/hr. VSS. Will carry out and will continue to monitor.

## 2022-07-28 NOTE — PROGRESS NOTES
[facility] - Surgical Intensive Care  Infectious Disease  Progress Note    Patient Name: Tim Richards  MRN: [id number]  Admission Date: 6/27/2022  Length of Stay: 31 days  Attending Physician: Yg Kaufman MD  Primary Care Provider: Deyanira Booth MD    Isolation Status: No active isolations  Assessment/Plan:      Shock  Likely multifactorial . On multiple pressor support.   · Management per primary.    Leukocytosis  Leukocytosis with associated fever post-decannulation. Blcx so far ngtd, resp cx unrevealing. Pt is at risk for fungal infection (prior lines, multiple surgeries). S/P HM3 with removal of old driveline. Up-escalated to meropenem due to acute decompensation.     Sputum cultures with K aerogenes. Meropenem transitioned to cefepime on 7/25. Leukocytosis stable. Febrile overnight. DLES cultures performed on 7/25 are no growth. Repeat sputum cultures with K aerogenes susceptible to cefepime. Fever curve appears to be down trending after addition of metronidazole.   · Continue cefepime.  · Continue metronidazole for anaerobic coverage.  · Will follow up blood cultures.   · Consider abdominal imaging to assess for intraabdominal fluid collections, hematomas, or ischemic gut as underlying etiologies of fever.   · Further antibiotic optimization with culture results.          Anticipated Disposition: per primary    Thank you for your consult. I will follow-up with patient. Please contact us if you have any additional questions.    Roxie Garg MD  Infectious Disease  [facility] - Surgical Intensive Care    Subjective:     Principal Problem:Left ventricular assist device (LVAD) complication    HPI: A 56-year-old man with HFrEF-10%, s/p VAD HM2 (March 2018), ICD, VT on amiodarone who developed malaise, anorexia, SOB, dark urine, and soft stools 3-4 days prior to admission. [facility] at which time he tested positive for Covid-19 infection. He was admitted for further management and  "started on Remdesivir treatment protocol. Since admission, he feels significantly improved with bowel movements returning to normal and the shortness of breath subsiding.     Infectious Diseases consulted for "covid infection, acute. Patient with LVAD"    ID reconsulted 7/21 for "sepsis and consideration for fungemia". Since pt was last seen by ID, patient underwent AV repair, redo sternotomy, explant of old lvad HM2 with implantation of HM3, and placed on VA-ECMO, takeback 7/14 for mediastinal washout/hematoma, and washout, sternal closure, creation of moises-pericardium (gerotex membrane) and wound vac placement on 7/15. Decannulated ECMO on 7/19 with subsequent fever and hypotension. Pt was placed on broad spectrum abx. Blcx obtained from 7/20 ngtd. S/p bronch on 7/20 - cx with normal respiratory sachin.           Interval History: "OK. Trying to catch my breath". Patient remains intubated and febrile. BAL culture on 7/20 now with K aerogenes. Blood cultures remain NGTD. Yellow mucoid drainage noted at DLES. Brown yellow secretions at prior chest tube exit site. Transitioned from meropenem to cefepime on 7/25.     Extubated on 7/26. Febrile to 101.6 F on 7/27. Repeat sputum culture with K aerogenes susceptible to cefepime. Metronidazole added on 7/27. Fever curve down trending.     Review of Systems   Unable to perform ROS: Severe respiratory distress   Objective:     Vital Signs (Most Recent):  Temp: 99.68 °F (37.6 °C) (07/28/22 1030)  Pulse: 80 (07/28/22 1030)  Resp: (!) 43 (07/28/22 1030)  BP: (!) 76/0 (07/28/22 0800)  SpO2: (!) 91 % (07/28/22 0800)   Vital Signs (24h Range):  Temp:  [97.88 °F (36.6 °C)-101.48 °F (38.6 °C)] 99.68 °F (37.6 °C)  Pulse:  [] 80  Resp:  [18-54] 43  SpO2:  [91 %-100 %] 91 %  BP: (76-86)/(0) 76/0  Arterial Line BP: ()/(45-96) 79/67     Weight: 104.8 kg (231 lb)  Body mass index is 30.48 kg/m².    Estimated Creatinine Clearance: 62.4 mL/min (A) (based on SCr of 1.7 mg/dL " (H)).    Physical Exam  Vitals and nursing note reviewed. Exam conducted with a chaperone present.   Constitutional:       General: He is sleeping.      Appearance: He is ill-appearing.   HENT:      Head: Normocephalic and atraumatic.   Eyes:      General: Scleral icterus present.         Right eye: No discharge.         Left eye: No discharge.      Conjunctiva/sclera: Conjunctivae normal.   Cardiovascular:      Pulses: Normal pulses.      Comments: Distant VAD Humming sounds  Pulmonary:      Effort: Tachypnea and accessory muscle usage present. No respiratory distress.      Breath sounds: No stridor. Rhonchi present. No wheezing or rales.   Abdominal:      General: Bowel sounds are decreased. There is no distension.      Palpations: Abdomen is soft.      Tenderness: There is no abdominal tenderness.          Comments: Left sided DLES covered with gauze dressing. Prior chest tube sites covered with gauze dressing.     Musculoskeletal:         General: Normal range of motion.   Skin:     General: Skin is warm and dry.   Neurological:      General: No focal deficit present.      Mental Status: He is easily aroused.      Comments: Awakens with verbal stimuli. Shakes head for yes/no questions.        Significant Labs: Blood Culture:   Recent Labs   Lab 07/20/22  1450 07/22/22  0857 07/22/22  0918 07/26/22  1200 07/26/22  1207   LABBLOO No growth after 5 days. No growth after 5 days. No growth after 5 days. No Growth to date  No Growth to date No Growth to date  No Growth to date       BMP:   Recent Labs   Lab 07/28/22  0302 07/28/22  0751   *  --      --    K 3.6 4.0     --    CO2 20*  --    BUN 47*  --    CREATININE 1.7*  --    CALCIUM 9.5  --    MG 2.5  --        CBC:   Recent Labs   Lab 07/27/22  1130 07/27/22  1941 07/28/22  0302   WBC 21.50* 23.69* 23.80*   HGB 7.8* 7.8* 7.7*   HCT 24.5* 25.2* 24.9*   * 488* 465*       Respiratory Culture:   Recent Labs   Lab 07/20/22  1120  07/26/22  1010   GSRESP Few WBC's  Rare Gram positive cocci  Rare Gram negative rods <10 epithelial cells per low power field.  Rare WBC's  No organisms seen   RESPIRATORYC  --  No S aureus or Pseudomonas isolated.  KLEBSIELLA AEROGENES  Rare  *       Urine Culture: No results for input(s): LABURIN in the last 4320 hours.  Urine Studies:   Recent Labs   Lab 07/27/22  1709   COLORU [place]   APPEARANCEUA Cloudy*   PHUR 5.0   SPECGRAV 1.010   PROTEINUA 1+*   GLUCUA Negative   KETONESU Negative   BILIRUBINUA Negative   OCCULTUA 3+*   NITRITE Negative   LEUKOCYTESUR Negative   RBCUA >100*   WBCUA 22*   BACTERIA Moderate*   SQUAMEPITHEL 1   HYALINECASTS 7*       Wound Culture:   Recent Labs   Lab 07/25/22  1503   LABAERO No growth         Significant Imaging: I have reviewed all pertinent imaging results/findings within the past 24 hours.

## 2022-07-28 NOTE — ASSESSMENT & PLAN NOTE
Leukocytosis with associated fever post-decannulation. Blcx so far ngtd, resp cx unrevealing. Pt is at risk for fungal infection (prior lines, multiple surgeries). S/P HM3 with removal of old driveline. Up-escalated to meropenem due to acute decompensation.     Sputum cultures with K aerogenes. Meropenem transitioned to cefepime on 7/25. Leukocytosis stable. Febrile overnight. DLES cultures performed on 7/25 are no growth. Repeat sputum cultures with K aerogenes susceptible to cefepime. Fever curve appears to be down trending after addition of metronidazole.   · Continue cefepime.  · Continue metronidazole for anaerobic coverage.  · Will follow up blood cultures.   · Consider abdominal imaging to assess for intraabdominal fluid collections, hematomas, or ischemic gut as underlying etiologies of fever.   · Further antibiotic optimization with culture results.

## 2022-07-28 NOTE — PT/OT/SLP PROGRESS
Occupational Therapy   Treatment    Name: Tim Richards  MRN: [id number]  Admitting Diagnosis:  Left ventricular assist device (LVAD) complication  6 Days Post-Op    Recommendations:     Discharge Recommendations: rehabilitation facility  Discharge Equipment Recommendations:   (TBD)  Barriers to discharge:       Assessment:     Tim Richards is a 56 y.o. male with a medical diagnosis of Left ventricular assist device (LVAD) complication. Pt sat EOB requiring Max A for sitting balance. D/C rec changed to IP Rehab as pt is currently well below his functional level. Performance deficits affecting function are weakness, impaired endurance, impaired sensation, impaired self care skills, impaired functional mobility, gait instability, impaired balance, decreased coordination, decreased safety awareness.     Rehab Prognosis:  Good; patient would benefit from acute skilled OT services to address these deficits and reach maximum level of function.       Plan:     Patient to be seen 5 x/week to address the above listed problems via self-care/home management, therapeutic activities, therapeutic exercises  · Plan of Care Expires: 08/25/22  · Plan of Care Reviewed with: patient    Subjective     Pain/Comfort:  · Pain Rating 1: 0/10    Objective:     Communicated with: rn prior to session.  Patient found supine with blood pressure cuff, central line, sun catheter, LVAD, NG tube, oxygen, PEG Tube, peripheral IV, pulse ox (continuous), telemetry upon OT entry to room.    General Precautions: Standard, LVAD, fall, sternal   Orthopedic Precautions:N/A   Braces:    Respiratory Status: Comfort flow, flow 40 L/min, concentration 84%     Occupational Performance:     Bed Mobility:    · Patient completed Rolling/Turning to Left with  minimum assistance  Patient completed Rolling/Turning to Right with  minimum assistance  · Patient completed Scooting/Bridging with maximal assistance  · Patient completed Supine to Sit with moderate  assistance  · Patient completed Sit to Supine with maximal assistance     Functional Mobility/Transfers:  Deferred due to poor sitting balance.    Activities of Daily Living:  · Grooming: contact guard assistance sitting EOB.    AMPAC 6 Click ADL: 12    Treatment & Education:  Pt sat EOB 6 minutes requiring Max A due to left/posterior lean.  Reminded of sternal precautions.  Discussed OT POC.    Patient left supine with all lines intact and call button in reachEducation:      GOALS:   Multidisciplinary Problems     Occupational Therapy Goals        Problem: Occupational Therapy    Goal Priority Disciplines Outcome Interventions   Occupational Therapy Goal     OT, PT/OT Ongoing, Progressing    Description: Goals to be met by: 8/9/22     Patient will increase functional independence with ADLs by performing:    UE Dressing with Stand-by Assistance.  LE Dressing with Stand-by Assistance.  Grooming while standing at sink with Stand-by Assistance.  Toileting from toilet with Stand-by Assistance for hygiene and clothing management.   Toilet transfer to toilet with Stand-by Assistance.               Multidisciplinary Problems (Resolved)        Problem: Occupational Therapy    Goal Priority Disciplines Outcome Interventions   Occupational Therapy Goal   (Resolved)     OT, PT/OT Met           Problem: Occupational Therapy    Goal Priority Disciplines Outcome Interventions   Occupational Therapy Goal   (Resolved)     OT, PT/OT Met                    Time Tracking:     OT Date of Treatment: 07/28/22  OT Start Time: 0809  OT Stop Time: 0833  OT Total Time (min): 24 min    Billable Minutes:Self Care/Home Management 10  Therapeutic Activity 14    OT/CARY: OT          7/28/2022

## 2022-07-28 NOTE — PROGRESS NOTES
07/28/2022  Estephania Martinez    Current provider:  Yg Kaufman MD    Device interrogation:  TXP LVAD INTERROGATIONS 7/28/2022 7/28/2022 7/28/2022 7/28/2022 7/28/2022 7/28/2022 7/28/2022   Type HeartMate3 HeartMate3 HeartMate3 HeartMate3 HeartMate3 HeartMate3 HeartMate3   Flow 5.5 5.6 5.6 5.7 5.6 5.6 5.6   Speed 6400 6400 6400 6400 6400 6400 6400   PI 2.6 2.9 3.3 2.7 3.2 2.7 2.5   Power (Jackson) 5.5 5.5 5.5 5.5 5.5 5.5 5.5   LSL 6000 6000 6000 6000 6000 6000 6000   Low Flow Alarm - - - - - - -   Pulsatility - - - - Intermittent pulse Intermittent pulse Intermittent pulse          Rounded on Tim Richards to ensure all mechanical assist device settings (IABP or VAD) were appropriate and all parameters were within limits.  I was able to ensure all back up equipment was present, the staff had no issues, and the Perfusion Department daily rounding was complete.      For implantable VADs: Interrogation of Ventricular assist device was performed with analysis of device parameters and review of device function. I have personally reviewed the interrogation findings and agree with findings as stated.     In emergency, the nursing units have been notified to contact the perfusion department either by:  Calling [phone] from 630am to 4pm Mon thru Fri, utilizing the On-Call Finder functionality of Epic and searching for Perfusion, or by contacting the hospital  from 4pm to 630am and on weekends and asking to speak with the perfusionist on call.    4:53 PM

## 2022-07-28 NOTE — PLAN OF CARE
"      SICU PLAN OF CARE NOTE    Dx: Left ventricular assist device (LVAD) complication    Shift Events: Lasix gtt decreased. Electrolytes replaced. LVAD numbers reported. High CVP reported. UO monitored. CPAP at night. ABGs drawn.     Neuro: AAO x4, Follows Commands, and Moves All Extremities    Vital Signs: BP (!) 85/0 (BP Location: Right arm, Patient Position: Lying)   Pulse 81   Temp 98.78 °F (37.1 °C)   Resp (!) 28   Ht 6' 1" (1.854 m)   Wt 104.8 kg (231 lb)   SpO2 96% Comment: Per ABG  BMI 30.48 kg/m²     Respiratory: BiPAP/CPAP worn at night. FiO2 of 60% sustained. Comfort tanya worn. 40L and 80% FiO2.    Diet: NPO    Gtts: Precedex, Vasopressin, Amiodarone, Lidocaine, Lasix, and Heparin    Urine Output: Urinary Catheter 1999 cc/shift of yellow urine.    Drains: NG/OG Tube 50 cc /  shift of light pink drainage.     VAD HeartMate3  Flows: 5.6 - 5.8  Speed: 6400  PIs: 1.7 - 3.3  Power: 5.5    Wound Vac located to right groin site. 300mL collected of serosanguinous drainage. Wound vac on a continuous suction setting of 125 mmHg. No leak. Dressing intact.     Labs/Accuchecks:   - WBC: 23.80  - Hgb: 7.7  - Hct: 24.9  - Platelets: 465  - INR: 1.4  - aPTT: 57.1  - Fibrinogen: 772  - Heparin Anti-Xa: 0.48  - Na: 141  - K: 3.6  - BUN: 47  - Creatinine: 1.7  - Glucose: 115  - Ca: 9.5  - Phos: 4.3  - Ma.5  - T. Bili: 8.9  - AST: 67  - ALT: 49       "

## 2022-07-28 NOTE — CARE UPDATE
MD notified of patient's RR sustaining 30-35 on CPAP and patient reporting an increase of shortness of breath. Hydroxyzine administered at 21:15 for anxiety per patinet request. Precedex restarted at 23:30. ABG obtained and results relayed to MD. FiO2 on CPAP decreased to 70%. VSS at this time. Will continue to monitor.

## 2022-07-29 ENCOUNTER — ANESTHESIA (OUTPATIENT)
Dept: INTENSIVE CARE | Facility: HOSPITAL | Age: 57
DRG: 001 | End: 2022-07-29
Payer: COMMERCIAL

## 2022-07-29 ENCOUNTER — ANESTHESIA EVENT (OUTPATIENT)
Dept: INTENSIVE CARE | Facility: HOSPITAL | Age: 57
DRG: 001 | End: 2022-07-29
Payer: COMMERCIAL

## 2022-07-29 LAB
ALBUMIN SERPL BCP-MCNC: 2 G/DL (ref 3.5–5.2)
ALBUMIN SERPL BCP-MCNC: 2.1 G/DL (ref 3.5–5.2)
ALBUMIN SERPL BCP-MCNC: 2.3 G/DL (ref 3.5–5.2)
ALBUMIN SERPL BCP-MCNC: 2.3 G/DL (ref 3.5–5.2)
ALLENS TEST: ABNORMAL
ALLENS TEST: NORMAL
ALP SERPL-CCNC: 102 U/L (ref 55–135)
ALP SERPL-CCNC: 113 U/L (ref 55–135)
ALP SERPL-CCNC: 127 U/L (ref 55–135)
ALP SERPL-CCNC: 127 U/L (ref 55–135)
ALT SERPL W/O P-5'-P-CCNC: 38 U/L (ref 10–44)
ALT SERPL W/O P-5'-P-CCNC: 40 U/L (ref 10–44)
ALT SERPL W/O P-5'-P-CCNC: 47 U/L (ref 10–44)
ALT SERPL W/O P-5'-P-CCNC: 47 U/L (ref 10–44)
ANION GAP SERPL CALC-SCNC: 13 MMOL/L (ref 8–16)
ANION GAP SERPL CALC-SCNC: 15 MMOL/L (ref 8–16)
ANION GAP SERPL CALC-SCNC: 16 MMOL/L (ref 8–16)
AST SERPL-CCNC: 46 U/L (ref 10–40)
AST SERPL-CCNC: 52 U/L (ref 10–40)
AST SERPL-CCNC: 52 U/L (ref 10–40)
AST SERPL-CCNC: 53 U/L (ref 10–40)
BACTERIA SPEC AEROBE CULT: NO GROWTH
BACTERIA UR CULT: NO GROWTH
BASOPHILS # BLD AUTO: 0.01 K/UL (ref 0–0.2)
BASOPHILS # BLD AUTO: 0.02 K/UL (ref 0–0.2)
BASOPHILS # BLD AUTO: 0.02 K/UL (ref 0–0.2)
BASOPHILS NFR BLD: 0.1 % (ref 0–1.9)
BILIRUB DIRECT SERPL-MCNC: 6.4 MG/DL (ref 0.1–0.3)
BILIRUB SERPL-MCNC: 6.5 MG/DL (ref 0.1–1)
BILIRUB SERPL-MCNC: 6.7 MG/DL (ref 0.1–1)
BILIRUB SERPL-MCNC: 8.2 MG/DL (ref 0.1–1)
BILIRUB SERPL-MCNC: 8.2 MG/DL (ref 0.1–1)
BNP SERPL-MCNC: 2059 PG/ML (ref 0–99)
BUN SERPL-MCNC: 68 MG/DL (ref 6–20)
BUN SERPL-MCNC: 72 MG/DL (ref 6–20)
BUN SERPL-MCNC: 77 MG/DL (ref 6–20)
CALCIUM SERPL-MCNC: 8.6 MG/DL (ref 8.7–10.5)
CALCIUM SERPL-MCNC: 9.2 MG/DL (ref 8.7–10.5)
CALCIUM SERPL-MCNC: 9.9 MG/DL (ref 8.7–10.5)
CHLORIDE SERPL-SCNC: 104 MMOL/L (ref 95–110)
CHLORIDE SERPL-SCNC: 104 MMOL/L (ref 95–110)
CHLORIDE SERPL-SCNC: 105 MMOL/L (ref 95–110)
CO2 SERPL-SCNC: 19 MMOL/L (ref 23–29)
CO2 SERPL-SCNC: 21 MMOL/L (ref 23–29)
CO2 SERPL-SCNC: 22 MMOL/L (ref 23–29)
CREAT SERPL-MCNC: 2.5 MG/DL (ref 0.5–1.4)
CREAT SERPL-MCNC: 2.7 MG/DL (ref 0.5–1.4)
CREAT SERPL-MCNC: 2.8 MG/DL (ref 0.5–1.4)
CRP SERPL-MCNC: 172.6 MG/L (ref 0–8.2)
DELSYS: ABNORMAL
DELSYS: NORMAL
DIFFERENTIAL METHOD: ABNORMAL
EOSINOPHIL # BLD AUTO: 0 K/UL (ref 0–0.5)
EOSINOPHIL NFR BLD: 0 % (ref 0–8)
EOSINOPHIL NFR BLD: 0 % (ref 0–8)
EOSINOPHIL NFR BLD: 0.1 % (ref 0–8)
ERYTHROCYTE [DISTWIDTH] IN BLOOD BY AUTOMATED COUNT: 21.8 % (ref 11.5–14.5)
ERYTHROCYTE [DISTWIDTH] IN BLOOD BY AUTOMATED COUNT: 22.1 % (ref 11.5–14.5)
ERYTHROCYTE [DISTWIDTH] IN BLOOD BY AUTOMATED COUNT: 22.5 % (ref 11.5–14.5)
ERYTHROCYTE [SEDIMENTATION RATE] IN BLOOD BY WESTERGREN METHOD: 18 MM/H
ERYTHROCYTE [SEDIMENTATION RATE] IN BLOOD BY WESTERGREN METHOD: 24 MM/H
EST. GFR  (AFRICAN AMERICAN): 28.1 ML/MIN/1.73 M^2
EST. GFR  (AFRICAN AMERICAN): 29.3 ML/MIN/1.73 M^2
EST. GFR  (AFRICAN AMERICAN): 32.2 ML/MIN/1.73 M^2
EST. GFR  (NON AFRICAN AMERICAN): 24.3 ML/MIN/1.73 M^2
EST. GFR  (NON AFRICAN AMERICAN): 25.4 ML/MIN/1.73 M^2
EST. GFR  (NON AFRICAN AMERICAN): 27.9 ML/MIN/1.73 M^2
FACT X PPP CHRO-ACNC: <0.1 IU/ML (ref 0.3–0.7)
FIBRINOGEN PPP-MCNC: 754 MG/DL (ref 182–400)
FIO2: 100
FIO2: 60
FIO2: 60
FIO2: 65
FIO2: 85
FIO2: 85
GLUCOSE SERPL-MCNC: 135 MG/DL (ref 70–110)
GLUCOSE SERPL-MCNC: 135 MG/DL (ref 70–110)
GLUCOSE SERPL-MCNC: 215 MG/DL (ref 70–110)
HCO3 UR-SCNC: 20.4 MMOL/L (ref 24–28)
HCO3 UR-SCNC: 20.8 MMOL/L (ref 24–28)
HCO3 UR-SCNC: 21 MMOL/L (ref 24–28)
HCO3 UR-SCNC: 21.1 MMOL/L (ref 24–28)
HCO3 UR-SCNC: 22 MMOL/L (ref 24–28)
HCO3 UR-SCNC: 24.7 MMOL/L (ref 24–28)
HCT VFR BLD AUTO: 23.8 % (ref 40–54)
HCT VFR BLD AUTO: 24.4 % (ref 40–54)
HCT VFR BLD AUTO: 25.1 % (ref 40–54)
HGB BLD-MCNC: 7.3 G/DL (ref 14–18)
HGB BLD-MCNC: 7.4 G/DL (ref 14–18)
HGB BLD-MCNC: 7.6 G/DL (ref 14–18)
IMM GRANULOCYTES # BLD AUTO: 0.25 K/UL (ref 0–0.04)
IMM GRANULOCYTES # BLD AUTO: 0.35 K/UL (ref 0–0.04)
IMM GRANULOCYTES # BLD AUTO: 0.63 K/UL (ref 0–0.04)
IMM GRANULOCYTES NFR BLD AUTO: 1.4 % (ref 0–0.5)
IMM GRANULOCYTES NFR BLD AUTO: 1.8 % (ref 0–0.5)
IMM GRANULOCYTES NFR BLD AUTO: 2.5 % (ref 0–0.5)
INR PPP: 1.7 (ref 0.8–1.2)
INR PPP: 1.8 (ref 0.8–1.2)
INR PPP: 2.1 (ref 0.8–1.2)
LACTATE SERPL-SCNC: 2.1 MMOL/L (ref 0.5–2.2)
LDH SERPL L TO P-CCNC: 0.76 MMOL/L (ref 0.36–1.25)
LDH SERPL L TO P-CCNC: 0.86 MMOL/L (ref 0.36–1.25)
LDH SERPL L TO P-CCNC: 1.14 MMOL/L (ref 0.36–1.25)
LDH SERPL L TO P-CCNC: 326 U/L (ref 110–260)
LIDOCAIN SERPL-MCNC: 1.6 MCG/ML (ref 1.5–5)
LYMPHOCYTES # BLD AUTO: 0.3 K/UL (ref 1–4.8)
LYMPHOCYTES # BLD AUTO: 0.5 K/UL (ref 1–4.8)
LYMPHOCYTES # BLD AUTO: 0.7 K/UL (ref 1–4.8)
LYMPHOCYTES NFR BLD: 1.2 % (ref 18–48)
LYMPHOCYTES NFR BLD: 2.7 % (ref 18–48)
LYMPHOCYTES NFR BLD: 4 % (ref 18–48)
MAGNESIUM SERPL-MCNC: 2.2 MG/DL (ref 1.6–2.6)
MAGNESIUM SERPL-MCNC: 2.3 MG/DL (ref 1.6–2.6)
MAGNESIUM SERPL-MCNC: 2.4 MG/DL (ref 1.6–2.6)
MCH RBC QN AUTO: 29.1 PG (ref 27–31)
MCH RBC QN AUTO: 29.5 PG (ref 27–31)
MCH RBC QN AUTO: 29.8 PG (ref 27–31)
MCHC RBC AUTO-ENTMCNC: 30.3 G/DL (ref 32–36)
MCHC RBC AUTO-ENTMCNC: 30.3 G/DL (ref 32–36)
MCHC RBC AUTO-ENTMCNC: 30.7 G/DL (ref 32–36)
MCV RBC AUTO: 95 FL (ref 82–98)
MCV RBC AUTO: 97 FL (ref 82–98)
MCV RBC AUTO: 98 FL (ref 82–98)
METHEMOGLOBIN: 1.4 % (ref 0–3)
MIN VOL: 10.5
MIN VOL: 10.5
MIN VOL: 13
MIN VOL: 14
MODE: ABNORMAL
MODE: NORMAL
MONOCYTES # BLD AUTO: 1.4 K/UL (ref 0.3–1)
MONOCYTES # BLD AUTO: 1.9 K/UL (ref 0.3–1)
MONOCYTES # BLD AUTO: 2 K/UL (ref 0.3–1)
MONOCYTES NFR BLD: 10.8 % (ref 4–15)
MONOCYTES NFR BLD: 5.6 % (ref 4–15)
MONOCYTES NFR BLD: 9.6 % (ref 4–15)
NEUTROPHILS # BLD AUTO: 15.3 K/UL (ref 1.8–7.7)
NEUTROPHILS # BLD AUTO: 16.6 K/UL (ref 1.8–7.7)
NEUTROPHILS # BLD AUTO: 22.8 K/UL (ref 1.8–7.7)
NEUTROPHILS NFR BLD: 83.6 % (ref 38–73)
NEUTROPHILS NFR BLD: 85.8 % (ref 38–73)
NEUTROPHILS NFR BLD: 90.6 % (ref 38–73)
NRBC BLD-RTO: 2 /100 WBC
NRBC BLD-RTO: 3 /100 WBC
NRBC BLD-RTO: 4 /100 WBC
PCO2 BLDA: 37 MMHG (ref 35–45)
PCO2 BLDA: 40.6 MMHG (ref 35–45)
PCO2 BLDA: 46.9 MMHG (ref 35–45)
PCO2 BLDA: 48.3 MMHG (ref 35–45)
PCO2 BLDA: 48.4 MMHG (ref 35–45)
PCO2 BLDA: 53.7 MMHG (ref 35–45)
PEEP: 7
PEEP: 8
PEEP: 8
PH SMN: 7.2 [PH] (ref 7.35–7.45)
PH SMN: 7.26 [PH] (ref 7.35–7.45)
PH SMN: 7.27 [PH] (ref 7.35–7.45)
PH SMN: 7.31 [PH] (ref 7.35–7.45)
PH SMN: 7.32 [PH] (ref 7.35–7.45)
PH SMN: 7.36 [PH] (ref 7.35–7.45)
PHOSPHATE SERPL-MCNC: 4.1 MG/DL (ref 2.7–4.5)
PHOSPHATE SERPL-MCNC: 5.1 MG/DL (ref 2.7–4.5)
PHOSPHATE SERPL-MCNC: 7 MG/DL (ref 2.7–4.5)
PIP: 28
PIP: 30
PLATELET # BLD AUTO: 468 K/UL (ref 150–450)
PLATELET # BLD AUTO: 472 K/UL (ref 150–450)
PLATELET # BLD AUTO: 571 K/UL (ref 150–450)
PMV BLD AUTO: 11.1 FL (ref 9.2–12.9)
PMV BLD AUTO: 11.2 FL (ref 9.2–12.9)
PMV BLD AUTO: 11.4 FL (ref 9.2–12.9)
PO2 BLDA: 169 MMHG (ref 80–100)
PO2 BLDA: 182 MMHG (ref 80–100)
PO2 BLDA: 186 MMHG (ref 80–100)
PO2 BLDA: 193 MMHG (ref 80–100)
PO2 BLDA: 195 MMHG (ref 80–100)
PO2 BLDA: 99 MMHG (ref 80–100)
POC BE: -2 MMOL/L
POC BE: -5 MMOL/L
POC BE: -5 MMOL/L
POC BE: -6 MMOL/L
POC BE: -6 MMOL/L
POC BE: -7 MMOL/L
POC SATURATED O2: 100 % (ref 95–100)
POC SATURATED O2: 100 % (ref 95–100)
POC SATURATED O2: 97 % (ref 95–100)
POC SATURATED O2: 99 % (ref 95–100)
POC TCO2: 22 MMOL/L (ref 23–27)
POC TCO2: 22 MMOL/L (ref 23–27)
POC TCO2: 23 MMOL/L (ref 23–27)
POC TCO2: 26 MMOL/L (ref 23–27)
POTASSIUM SERPL-SCNC: 3.7 MMOL/L (ref 3.5–5.1)
POTASSIUM SERPL-SCNC: 4.2 MMOL/L (ref 3.5–5.1)
POTASSIUM SERPL-SCNC: 4.8 MMOL/L (ref 3.5–5.1)
PREALB SERPL-MCNC: 13 MG/DL (ref 20–43)
PROT SERPL-MCNC: 6 G/DL (ref 6–8.4)
PROT SERPL-MCNC: 6.3 G/DL (ref 6–8.4)
PROT SERPL-MCNC: 6.3 G/DL (ref 6–8.4)
PROT SERPL-MCNC: 6.5 G/DL (ref 6–8.4)
PROTHROMBIN TIME: 16.9 SEC (ref 9–12.5)
PROTHROMBIN TIME: 17.9 SEC (ref 9–12.5)
PROTHROMBIN TIME: 20.9 SEC (ref 9–12.5)
RBC # BLD AUTO: 2.51 M/UL (ref 4.6–6.2)
RBC # BLD AUTO: 2.51 M/UL (ref 4.6–6.2)
RBC # BLD AUTO: 2.55 M/UL (ref 4.6–6.2)
SAMPLE: ABNORMAL
SAMPLE: NORMAL
SARS-COV-2 RNA RESP QL NAA+PROBE: NOT DETECTED
SITE: ABNORMAL
SITE: NORMAL
SODIUM SERPL-SCNC: 139 MMOL/L (ref 136–145)
SODIUM SERPL-SCNC: 140 MMOL/L (ref 136–145)
SODIUM SERPL-SCNC: 140 MMOL/L (ref 136–145)
SP02: 65
SP02: 98
SP02: 98
T3 SERPL-MCNC: 88 NG/DL (ref 60–180)
T4 FREE SERPL-MCNC: 2.71 NG/DL (ref 0.71–1.51)
TRIGL SERPL-MCNC: 183 MG/DL (ref 30–150)
VT: 450
WBC # BLD AUTO: 18.31 K/UL (ref 3.9–12.7)
WBC # BLD AUTO: 19.35 K/UL (ref 3.9–12.7)
WBC # BLD AUTO: 25.18 K/UL (ref 3.9–12.7)

## 2022-07-29 PROCEDURE — 84480 ASSAY TRIIODOTHYRONINE (T3): CPT | Performed by: STUDENT IN AN ORGANIZED HEALTH CARE EDUCATION/TRAINING PROGRAM

## 2022-07-29 PROCEDURE — 63600175 PHARM REV CODE 636 W HCPCS

## 2022-07-29 PROCEDURE — 25000003 PHARM REV CODE 250

## 2022-07-29 PROCEDURE — 84478 ASSAY OF TRIGLYCERIDES: CPT | Performed by: THORACIC SURGERY (CARDIOTHORACIC VASCULAR SURGERY)

## 2022-07-29 PROCEDURE — 85025 COMPLETE CBC W/AUTO DIFF WBC: CPT | Performed by: THORACIC SURGERY (CARDIOTHORACIC VASCULAR SURGERY)

## 2022-07-29 PROCEDURE — 99900025 HC BRONCHOSCOPY-ASST (STAT)

## 2022-07-29 PROCEDURE — 99232 PR SUBSEQUENT HOSPITAL CARE,LEVL II: ICD-10-PCS | Mod: ,,, | Performed by: INTERNAL MEDICINE

## 2022-07-29 PROCEDURE — 31624 DX BRONCHOSCOPE/LAVAGE: CPT

## 2022-07-29 PROCEDURE — 99291 PR CRITICAL CARE, E/M 30-74 MINUTES: ICD-10-PCS | Mod: ,,, | Performed by: INTERNAL MEDICINE

## 2022-07-29 PROCEDURE — 83050 HGB METHEMOGLOBIN QUAN: CPT

## 2022-07-29 PROCEDURE — U0003 INFECTIOUS AGENT DETECTION BY NUCLEIC ACID (DNA OR RNA); SEVERE ACUTE RESPIRATORY SYNDROME CORONAVIRUS 2 (SARS-COV-2) (CORONAVIRUS DISEASE [COVID-19]), AMPLIFIED PROBE TECHNIQUE, MAKING USE OF HIGH THROUGHPUT TECHNOLOGIES AS DESCRIBED BY CMS-2020-01-R: HCPCS

## 2022-07-29 PROCEDURE — 94640 AIRWAY INHALATION TREATMENT: CPT

## 2022-07-29 PROCEDURE — 99232 SBSQ HOSP IP/OBS MODERATE 35: CPT | Mod: ,,, | Performed by: INTERNAL MEDICINE

## 2022-07-29 PROCEDURE — 25000003 PHARM REV CODE 250: Performed by: THORACIC SURGERY (CARDIOTHORACIC VASCULAR SURGERY)

## 2022-07-29 PROCEDURE — 84439 ASSAY OF FREE THYROXINE: CPT | Performed by: STUDENT IN AN ORGANIZED HEALTH CARE EDUCATION/TRAINING PROGRAM

## 2022-07-29 PROCEDURE — 85610 PROTHROMBIN TIME: CPT | Performed by: THORACIC SURGERY (CARDIOTHORACIC VASCULAR SURGERY)

## 2022-07-29 PROCEDURE — 83880 ASSAY OF NATRIURETIC PEPTIDE: CPT | Performed by: STUDENT IN AN ORGANIZED HEALTH CARE EDUCATION/TRAINING PROGRAM

## 2022-07-29 PROCEDURE — 94761 N-INVAS EAR/PLS OXIMETRY MLT: CPT

## 2022-07-29 PROCEDURE — 63600175 PHARM REV CODE 636 W HCPCS: Performed by: THORACIC SURGERY (CARDIOTHORACIC VASCULAR SURGERY)

## 2022-07-29 PROCEDURE — 27000248 HC VAD-ADDITIONAL DAY

## 2022-07-29 PROCEDURE — 99291 CRITICAL CARE FIRST HOUR: CPT | Mod: ,,, | Performed by: INTERNAL MEDICINE

## 2022-07-29 PROCEDURE — 63600367 HC NITRIC OXIDE PER HOUR

## 2022-07-29 PROCEDURE — 85384 FIBRINOGEN ACTIVITY: CPT | Performed by: THORACIC SURGERY (CARDIOTHORACIC VASCULAR SURGERY)

## 2022-07-29 PROCEDURE — 25500020 PHARM REV CODE 255: Performed by: THORACIC SURGERY (CARDIOTHORACIC VASCULAR SURGERY)

## 2022-07-29 PROCEDURE — 37799 UNLISTED PX VASCULAR SURGERY: CPT

## 2022-07-29 PROCEDURE — 99900026 HC AIRWAY MAINTENANCE (STAT)

## 2022-07-29 PROCEDURE — 25000242 PHARM REV CODE 250 ALT 637 W/ HCPCS: Performed by: THORACIC SURGERY (CARDIOTHORACIC VASCULAR SURGERY)

## 2022-07-29 PROCEDURE — 31500 AD HOC INTUBATION: ICD-10-PCS | Mod: ,,, | Performed by: ANESTHESIOLOGY

## 2022-07-29 PROCEDURE — 80053 COMPREHEN METABOLIC PANEL: CPT | Mod: 91 | Performed by: THORACIC SURGERY (CARDIOTHORACIC VASCULAR SURGERY)

## 2022-07-29 PROCEDURE — 25000003 PHARM REV CODE 250: Performed by: STUDENT IN AN ORGANIZED HEALTH CARE EDUCATION/TRAINING PROGRAM

## 2022-07-29 PROCEDURE — 99233 PR SUBSEQUENT HOSPITAL CARE,LEVL III: ICD-10-PCS | Mod: ,,, | Performed by: PHYSICIAN ASSISTANT

## 2022-07-29 PROCEDURE — 99233 PR SUBSEQUENT HOSPITAL CARE,LEVL III: ICD-10-PCS | Mod: ,,, | Performed by: INTERNAL MEDICINE

## 2022-07-29 PROCEDURE — 86140 C-REACTIVE PROTEIN: CPT | Performed by: STUDENT IN AN ORGANIZED HEALTH CARE EDUCATION/TRAINING PROGRAM

## 2022-07-29 PROCEDURE — 93750 INTERROGATION VAD IN PERSON: CPT | Mod: ,,, | Performed by: INTERNAL MEDICINE

## 2022-07-29 PROCEDURE — 83615 LACTATE (LD) (LDH) ENZYME: CPT | Performed by: STUDENT IN AN ORGANIZED HEALTH CARE EDUCATION/TRAINING PROGRAM

## 2022-07-29 PROCEDURE — 27000221 HC OXYGEN, UP TO 24 HOURS

## 2022-07-29 PROCEDURE — 84100 ASSAY OF PHOSPHORUS: CPT | Performed by: THORACIC SURGERY (CARDIOTHORACIC VASCULAR SURGERY)

## 2022-07-29 PROCEDURE — 31500 INSERT EMERGENCY AIRWAY: CPT | Mod: ,,, | Performed by: ANESTHESIOLOGY

## 2022-07-29 PROCEDURE — 99232 SBSQ HOSP IP/OBS MODERATE 35: CPT | Mod: ,,, | Performed by: ANESTHESIOLOGY

## 2022-07-29 PROCEDURE — 99900035 HC TECH TIME PER 15 MIN (STAT)

## 2022-07-29 PROCEDURE — C9113 INJ PANTOPRAZOLE SODIUM, VIA: HCPCS

## 2022-07-29 PROCEDURE — 63600175 PHARM REV CODE 636 W HCPCS: Performed by: STUDENT IN AN ORGANIZED HEALTH CARE EDUCATION/TRAINING PROGRAM

## 2022-07-29 PROCEDURE — 83605 ASSAY OF LACTIC ACID: CPT

## 2022-07-29 PROCEDURE — 99232 PR SUBSEQUENT HOSPITAL CARE,LEVL II: ICD-10-PCS | Mod: ,,, | Performed by: ANESTHESIOLOGY

## 2022-07-29 PROCEDURE — A4217 STERILE WATER/SALINE, 500 ML: HCPCS

## 2022-07-29 PROCEDURE — 80076 HEPATIC FUNCTION PANEL: CPT | Performed by: STUDENT IN AN ORGANIZED HEALTH CARE EDUCATION/TRAINING PROGRAM

## 2022-07-29 PROCEDURE — 25000003 PHARM REV CODE 250: Performed by: PHYSICIAN ASSISTANT

## 2022-07-29 PROCEDURE — 94002 VENT MGMT INPAT INIT DAY: CPT

## 2022-07-29 PROCEDURE — U0005 INFEC AGEN DETEC AMPLI PROBE: HCPCS

## 2022-07-29 PROCEDURE — 99233 SBSQ HOSP IP/OBS HIGH 50: CPT | Mod: ,,, | Performed by: INTERNAL MEDICINE

## 2022-07-29 PROCEDURE — 93750 PR INTERROGATE VENT ASSIST DEV, IN PERSON, W PHYSICIAN ANALYSIS: ICD-10-PCS | Mod: ,,, | Performed by: INTERNAL MEDICINE

## 2022-07-29 PROCEDURE — 85520 HEPARIN ASSAY: CPT | Performed by: THORACIC SURGERY (CARDIOTHORACIC VASCULAR SURGERY)

## 2022-07-29 PROCEDURE — 83605 ASSAY OF LACTIC ACID: CPT | Performed by: INTERNAL MEDICINE

## 2022-07-29 PROCEDURE — 83735 ASSAY OF MAGNESIUM: CPT | Mod: 91 | Performed by: THORACIC SURGERY (CARDIOTHORACIC VASCULAR SURGERY)

## 2022-07-29 PROCEDURE — 99233 SBSQ HOSP IP/OBS HIGH 50: CPT | Mod: ,,, | Performed by: PHYSICIAN ASSISTANT

## 2022-07-29 PROCEDURE — 25000242 PHARM REV CODE 250 ALT 637 W/ HCPCS

## 2022-07-29 PROCEDURE — 84134 ASSAY OF PREALBUMIN: CPT | Performed by: STUDENT IN AN ORGANIZED HEALTH CARE EDUCATION/TRAINING PROGRAM

## 2022-07-29 PROCEDURE — 82803 BLOOD GASES ANY COMBINATION: CPT

## 2022-07-29 PROCEDURE — 20000000 HC ICU ROOM

## 2022-07-29 RX ORDER — PROPOFOL 10 MG/ML
0-50 INJECTION, EMULSION INTRAVENOUS CONTINUOUS
Status: DISCONTINUED | OUTPATIENT
Start: 2022-07-29 | End: 2022-08-03

## 2022-07-29 RX ORDER — MAGNESIUM SULFATE HEPTAHYDRATE 40 MG/ML
2 INJECTION, SOLUTION INTRAVENOUS ONCE
Status: COMPLETED | OUTPATIENT
Start: 2022-07-29 | End: 2022-07-29

## 2022-07-29 RX ORDER — INSULIN ASPART 100 [IU]/ML
1-10 INJECTION, SOLUTION INTRAVENOUS; SUBCUTANEOUS EVERY 4 HOURS PRN
Status: DISCONTINUED | OUTPATIENT
Start: 2022-07-29 | End: 2022-07-30

## 2022-07-29 RX ORDER — PROPOFOL 10 MG/ML
40 VIAL (ML) INTRAVENOUS ONCE
Status: COMPLETED | OUTPATIENT
Start: 2022-07-29 | End: 2022-07-29

## 2022-07-29 RX ORDER — CHOLESTYRAMINE 4 G/9G
1 POWDER, FOR SUSPENSION ORAL 4 TIMES DAILY
Status: DISCONTINUED | OUTPATIENT
Start: 2022-07-29 | End: 2022-08-15

## 2022-07-29 RX ORDER — SUCCINYLCHOLINE CHLORIDE 20 MG/ML
INJECTION INTRAMUSCULAR; INTRAVENOUS
Status: COMPLETED
Start: 2022-07-29 | End: 2022-07-29

## 2022-07-29 RX ORDER — ROCURONIUM BROMIDE 10 MG/ML
INJECTION, SOLUTION INTRAVENOUS
Status: DISCONTINUED
Start: 2022-07-29 | End: 2022-07-29 | Stop reason: WASHOUT

## 2022-07-29 RX ORDER — DEXMEDETOMIDINE HYDROCHLORIDE 4 UG/ML
INJECTION, SOLUTION INTRAVENOUS
Status: COMPLETED
Start: 2022-07-29 | End: 2022-07-29

## 2022-07-29 RX ORDER — DEXMEDETOMIDINE HYDROCHLORIDE 4 UG/ML
0-1.4 INJECTION, SOLUTION INTRAVENOUS CONTINUOUS
Status: DISCONTINUED | OUTPATIENT
Start: 2022-07-29 | End: 2022-08-08

## 2022-07-29 RX ORDER — EPINEPHRINE 1 MG/ML
INJECTION INTRAMUSCULAR; INTRAVENOUS; SUBCUTANEOUS
Status: DISPENSED
Start: 2022-07-29 | End: 2022-07-29

## 2022-07-29 RX ORDER — POTASSIUM CHLORIDE 29.8 MG/ML
40 INJECTION INTRAVENOUS ONCE
Status: COMPLETED | OUTPATIENT
Start: 2022-07-29 | End: 2022-07-30

## 2022-07-29 RX ORDER — ALBUMIN HUMAN 50 G/1000ML
SOLUTION INTRAVENOUS
Status: DISPENSED
Start: 2022-07-29 | End: 2022-07-29

## 2022-07-29 RX ORDER — ONDANSETRON 2 MG/ML
1 INJECTION INTRAMUSCULAR; INTRAVENOUS ONCE
Status: COMPLETED | OUTPATIENT
Start: 2022-07-29 | End: 2022-07-29

## 2022-07-29 RX ORDER — ETOMIDATE 2 MG/ML
INJECTION INTRAVENOUS
Status: COMPLETED
Start: 2022-07-29 | End: 2022-07-29

## 2022-07-29 RX ORDER — DEXTROSE MONOHYDRATE 100 MG/ML
INJECTION, SOLUTION INTRAVENOUS CONTINUOUS PRN
Status: DISCONTINUED | OUTPATIENT
Start: 2022-07-29 | End: 2022-09-01 | Stop reason: HOSPADM

## 2022-07-29 RX ORDER — PROPOFOL 10 MG/ML
VIAL (ML) INTRAVENOUS
Status: COMPLETED
Start: 2022-07-29 | End: 2022-07-29

## 2022-07-29 RX ORDER — SUCCINYLCHOLINE CHLORIDE 20 MG/ML
1.5 INJECTION INTRAMUSCULAR; INTRAVENOUS ONCE
Status: DISCONTINUED | OUTPATIENT
Start: 2022-07-29 | End: 2022-07-29

## 2022-07-29 RX ORDER — HEPARIN SODIUM 10000 [USP'U]/100ML
2000 INJECTION, SOLUTION INTRAVENOUS CONTINUOUS
Status: DISCONTINUED | OUTPATIENT
Start: 2022-07-29 | End: 2022-08-09

## 2022-07-29 RX ORDER — ETOMIDATE 2 MG/ML
10 INJECTION INTRAVENOUS ONCE
Status: COMPLETED | OUTPATIENT
Start: 2022-07-29 | End: 2022-07-29

## 2022-07-29 RX ORDER — PROPOFOL 10 MG/ML
INJECTION, EMULSION INTRAVENOUS
Status: COMPLETED
Start: 2022-07-29 | End: 2022-07-29

## 2022-07-29 RX ORDER — GLUCAGON 1 MG
1 KIT INJECTION
Status: DISCONTINUED | OUTPATIENT
Start: 2022-07-29 | End: 2022-09-01 | Stop reason: HOSPADM

## 2022-07-29 RX ORDER — WARFARIN 2.5 MG/1
2.5 TABLET ORAL DAILY
Status: DISCONTINUED | OUTPATIENT
Start: 2022-07-29 | End: 2022-07-29

## 2022-07-29 RX ORDER — CALCIUM GLUCONATE 20 MG/ML
1 INJECTION, SOLUTION INTRAVENOUS ONCE
Status: DISCONTINUED | OUTPATIENT
Start: 2022-07-29 | End: 2022-08-01

## 2022-07-29 RX ORDER — NOREPINEPHRINE BITARTRATE 1 MG/ML
20 INJECTION, SOLUTION INTRAVENOUS ONCE
Status: DISCONTINUED | OUTPATIENT
Start: 2022-07-29 | End: 2022-07-29

## 2022-07-29 RX ADMIN — LEVALBUTEROL HYDROCHLORIDE 0.63 MG: 0.63 SOLUTION RESPIRATORY (INHALATION) at 01:07

## 2022-07-29 RX ADMIN — AMIODARONE HYDROCHLORIDE 400 MG: 200 TABLET ORAL at 09:07

## 2022-07-29 RX ADMIN — IOHEXOL 100 ML: 350 INJECTION, SOLUTION INTRAVENOUS at 10:07

## 2022-07-29 RX ADMIN — AMIODARONE HYDROCHLORIDE 400 MG: 200 TABLET ORAL at 08:07

## 2022-07-29 RX ADMIN — METRONIDAZOLE 500 MG: 500 TABLET ORAL at 09:07

## 2022-07-29 RX ADMIN — HEPARIN SODIUM 2200 UNITS/HR: 5000 INJECTION INTRAVENOUS; SUBCUTANEOUS at 05:07

## 2022-07-29 RX ADMIN — ASPIRIN 81 MG CHEWABLE TABLET 81 MG: 81 TABLET CHEWABLE at 09:07

## 2022-07-29 RX ADMIN — DEXMEDETOMIDINE HYDROCHLORIDE 1.4 MCG/KG/HR: 4 INJECTION, SOLUTION INTRAVENOUS at 05:07

## 2022-07-29 RX ADMIN — SUCCINYLCHOLINE CHLORIDE 120 MG: 20 INJECTION, SOLUTION INTRAMUSCULAR; INTRAVENOUS; PARENTERAL at 01:07

## 2022-07-29 RX ADMIN — METHIMAZOLE 20 MG: 10 TABLET ORAL at 08:07

## 2022-07-29 RX ADMIN — HEPARIN SODIUM 1200 UNITS/HR: 5000 INJECTION INTRAVENOUS; SUBCUTANEOUS at 05:07

## 2022-07-29 RX ADMIN — HYDROMORPHONE HYDROCHLORIDE 1 MG: 1 INJECTION, SOLUTION INTRAMUSCULAR; INTRAVENOUS; SUBCUTANEOUS at 03:07

## 2022-07-29 RX ADMIN — AMIODARONE HYDROCHLORIDE 0.25 MG/MIN: 1.8 INJECTION, SOLUTION INTRAVENOUS at 12:07

## 2022-07-29 RX ADMIN — MEXILETINE HYDROCHLORIDE 400 MG: 200 CAPSULE ORAL at 02:07

## 2022-07-29 RX ADMIN — DEXMEDETOMIDINE HYDROCHLORIDE 0.4 MCG/KG/HR: 4 INJECTION, SOLUTION INTRAVENOUS at 02:07

## 2022-07-29 RX ADMIN — MEXILETINE HYDROCHLORIDE 400 MG: 200 CAPSULE ORAL at 09:07

## 2022-07-29 RX ADMIN — LIDOCAINE HYDROCHLORIDE 0.25 MG/MIN: 8 INJECTION, SOLUTION INTRAVENOUS at 05:07

## 2022-07-29 RX ADMIN — ASCORBIC ACID, VITAMIN A PALMITATE, CHOLECALCIFEROL, THIAMINE HYDROCHLORIDE, RIBOFLAVIN-5 PHOSPHATE SODIUM, PYRIDOXINE HYDROCHLORIDE, NIACINAMIDE, DEXPANTHENOL, ALPHA-TOCOPHEROL ACETATE, VITAMIN K1, FOLIC ACID, BIOTIN, CYANOCOBALAMIN: 200; 3300; 200; 6; 3.6; 6; 40; 15; 10; 150; 600; 60; 5 INJECTION, SOLUTION INTRAVENOUS at 09:07

## 2022-07-29 RX ADMIN — PROPOFOL 35 MCG/KG/MIN: 10 INJECTION, EMULSION INTRAVENOUS at 01:07

## 2022-07-29 RX ADMIN — CHOLESTYRAMINE 4 G: 4 POWDER, FOR SUSPENSION ORAL at 08:07

## 2022-07-29 RX ADMIN — DEXMEDETOMIDINE HYDROCHLORIDE 1.4 MCG/KG/HR: 4 INJECTION INTRAVENOUS at 05:07

## 2022-07-29 RX ADMIN — MIDODRINE HYDROCHLORIDE 15 MG: 5 TABLET ORAL at 05:07

## 2022-07-29 RX ADMIN — DEXMEDETOMIDINE HYDROCHLORIDE 0.4 MCG/KG/HR: 4 INJECTION INTRAVENOUS at 02:07

## 2022-07-29 RX ADMIN — PROPOFOL 10 MCG/KG/MIN: 10 INJECTION, EMULSION INTRAVENOUS at 11:07

## 2022-07-29 RX ADMIN — Medication 0.06 MCG/KG/MIN: at 12:07

## 2022-07-29 RX ADMIN — CEFEPIME 2 G: 2 INJECTION, POWDER, FOR SOLUTION INTRAVENOUS at 07:07

## 2022-07-29 RX ADMIN — CEFEPIME 2 G: 2 INJECTION, POWDER, FOR SOLUTION INTRAVENOUS at 09:07

## 2022-07-29 RX ADMIN — PREDNISONE 40 MG: 20 TABLET ORAL at 09:07

## 2022-07-29 RX ADMIN — LEVALBUTEROL HYDROCHLORIDE 0.63 MG: 0.63 SOLUTION RESPIRATORY (INHALATION) at 07:07

## 2022-07-29 RX ADMIN — VASOPRESSIN 0.04 UNITS/MIN: 20 INJECTION INTRAVENOUS at 05:07

## 2022-07-29 RX ADMIN — PANTOPRAZOLE SODIUM 40 MG: 40 INJECTION, POWDER, FOR SOLUTION INTRAVENOUS at 08:07

## 2022-07-29 RX ADMIN — PANTOPRAZOLE SODIUM 40 MG: 40 INJECTION, POWDER, FOR SOLUTION INTRAVENOUS at 09:07

## 2022-07-29 RX ADMIN — MIDODRINE HYDROCHLORIDE 15 MG: 5 TABLET ORAL at 09:07

## 2022-07-29 RX ADMIN — METHIMAZOLE 20 MG: 10 TABLET ORAL at 09:07

## 2022-07-29 RX ADMIN — MIDODRINE HYDROCHLORIDE 15 MG: 5 TABLET ORAL at 02:07

## 2022-07-29 RX ADMIN — ACETYLCYSTEINE 4 ML: 100 SOLUTION ORAL; RESPIRATORY (INHALATION) at 01:07

## 2022-07-29 RX ADMIN — METRONIDAZOLE 500 MG: 500 TABLET ORAL at 02:07

## 2022-07-29 RX ADMIN — MAGNESIUM SULFATE 2 G: 2 INJECTION INTRAVENOUS at 08:07

## 2022-07-29 RX ADMIN — PROPOFOL 25 MCG/KG/MIN: 10 INJECTION, EMULSION INTRAVENOUS at 02:07

## 2022-07-29 RX ADMIN — POTASSIUM CHLORIDE 40 MEQ: 29.8 INJECTION, SOLUTION INTRAVENOUS at 08:07

## 2022-07-29 RX ADMIN — ACETYLCYSTEINE 4 ML: 100 SOLUTION ORAL; RESPIRATORY (INHALATION) at 07:07

## 2022-07-29 RX ADMIN — MEXILETINE HYDROCHLORIDE 400 MG: 200 CAPSULE ORAL at 05:07

## 2022-07-29 RX ADMIN — ETOMIDATE 10 MG: 2 INJECTION INTRAVENOUS at 01:07

## 2022-07-29 RX ADMIN — ACETYLCYSTEINE 4 ML: 100 SOLUTION ORAL; RESPIRATORY (INHALATION) at 12:07

## 2022-07-29 RX ADMIN — FUROSEMIDE 7.5 MG/HR: 10 INJECTION, SOLUTION INTRAMUSCULAR; INTRAVENOUS at 04:07

## 2022-07-29 RX ADMIN — ONDANSETRON 10 ML: 2 INJECTION INTRAMUSCULAR; INTRAVENOUS at 12:07

## 2022-07-29 RX ADMIN — NOREPINEPHRINE BITARTRATE 0.07 MCG/KG/MIN: 8 INJECTION, SOLUTION INTRAVENOUS at 04:07

## 2022-07-29 RX ADMIN — POLYETHYLENE GLYCOL 3350 17 G: 17 POWDER, FOR SOLUTION ORAL at 09:07

## 2022-07-29 RX ADMIN — GUAIFENESIN 300 MG: 100 SOLUTION ORAL at 05:07

## 2022-07-29 RX ADMIN — PROPOFOL 40 MG: 10 INJECTION, EMULSION INTRAVENOUS at 01:07

## 2022-07-29 RX ADMIN — AMIODARONE HYDROCHLORIDE 400 MG: 200 TABLET ORAL at 02:07

## 2022-07-29 RX ADMIN — AMIODARONE HYDROCHLORIDE 0.25 MG/MIN: 1.8 INJECTION, SOLUTION INTRAVENOUS at 05:07

## 2022-07-29 RX ADMIN — DEXMEDETOMIDINE HYDROCHLORIDE 0.5 MCG/KG/HR: 4 INJECTION INTRAVENOUS at 02:07

## 2022-07-29 RX ADMIN — GUAIFENESIN 300 MG: 100 SOLUTION ORAL at 12:07

## 2022-07-29 RX ADMIN — HYDROMORPHONE HYDROCHLORIDE 0.5 MG: 1 INJECTION, SOLUTION INTRAMUSCULAR; INTRAVENOUS; SUBCUTANEOUS at 03:07

## 2022-07-29 RX ADMIN — Medication 40 MG: at 01:07

## 2022-07-29 RX ADMIN — LEVALBUTEROL HYDROCHLORIDE 0.63 MG: 0.63 SOLUTION RESPIRATORY (INHALATION) at 12:07

## 2022-07-29 RX ADMIN — METRONIDAZOLE 500 MG: 500 TABLET ORAL at 05:07

## 2022-07-29 RX ADMIN — FUROSEMIDE 1 MG/HR: 10 INJECTION, SOLUTION INTRAMUSCULAR; INTRAVENOUS at 02:07

## 2022-07-29 NOTE — ASSESSMENT & PLAN NOTE
Tim Richards is a 56 y.o. male HFrEF with an EF of 10% and a history of HM2 LVAD in 2018 who is now s/p HM3 upgrade, initiation of V-A ECMO after failure to wean off bypass x2      Neuro/Psych:   -- Sedation: precedex, propofol   -- Pain: PRN Dilaudid             Cards:   -- S/P LVAD exchange on 7/14/22  --Improving pressor requirements, wean gwen for MAP > 75, keep other pressors the same   Epi 0.06   Levo 0.07   Vaso 0.04   Giapreza off  -- Continue Midodrine 15 mg Q8  -- Stress dose steroids complete  -- MAP goal 75  -- VAD  flows stable  -- Decannulated on 7/19  -- Sternal closure on 7/15  -- Bedside drive line removal on 7/23  -- EP consult due to v-tach  -- Continue lidocaine, continue amio ggt, restarted mexiletine      Pulm:   -- Goal O2 sat > 90%  -- ABG PRN  -- Chest tubes removed  -- Nitric weaned at 20  -- Extubated 7/26, re intubated 7/28  -- Monitor O2 sat with ABGs      Renal:  -- Keep sun for strict I/O  -- Trend BUN/Cr  -- Lasix gtt 10/hr        FEN / GI:   -- Replace lytes as needed  -- Nutrition: NPO,TPN  -- GI ppx: PPI  -- Bowel reg: miralax, docusate, mag citrate      ID:   -- Tm: febrile; WBC increasing   -- ABX cefepime, falgyl  -- Cultures sent, repeat cultures sent 7/22, no growth to date, repeat cultures sent 7/26 --> gram negative rods on sputum cultures  -- BAL GNR --> pansensitive klebsiella   -- Repeat sputum cultures --> pansensitive klebsiella  -- ID consult   -- daily vanc levels  -- Line exchange 7/21  -- UA with many bacteria and WBCs, culture pending  -- consider CT scan due to ongoing intermittent fevers and increasing WBC      Heme/Onc:   -- H/H stable   -- Daily CBC  -- Received 18 pRBCs, 16 FFP, 2 plt, 25 cryo; 1500 albumin intra-op  -- Heparin gtt at 2500, goal anti Xa 0.3  -- Warfarin 2.5mg      Endo:   -- BG goal 140-180  -- SSI  -- Levothyroxine discontinued  -- hyperthyroid despite being hypothyroid at home  -- Endocrine consulted  -- Started on prednisone  and methimazole       PPx:   Feeding: NPO, TPN  Analgesia/Sedation: Precedex, propofol / PRN Dilaudid  Thromboembolic prevention: SCDs, heparin gtt  HOB >30: Yes  Stress Ulcer ppx: PPI  Glucose control: Critical care goal 140-180 g/dl, ISS     Lines/Drains/Airway: NG; RIJ CVC, r-radial a-line, sun; WV, VAD; midline      Dispo/Code Status/Palliative:   -- SICU / Full Code.

## 2022-07-29 NOTE — ASSESSMENT & PLAN NOTE
Echo    Interpretation Summary  · There is an LVAD present. Base speed is 6400 RPMs. The pump type is a Heartmate III. The interventricular septum appears midline. The aortic valve does not open.  · The left ventricle is severely enlarged with eccentric hypertrophy and severely decreased systolic function.  · The estimated ejection fraction is 13%.  · There is abnormal septal wall motion.  · There is severe left ventricular global hypokinesis.  · Left ventricular diastolic dysfunction.  · Mild left atrial enlargement.  · Moderate right ventricular enlargement with mildly reduced right ventricular systolic function.  · Moderate right atrial enlargement.  · Moderate tricuspid regurgitation.  · Intermediate central venous pressure (8 mmHg).  · The estimated PA systolic pressure is 45 mmHg.  · There is pulmonary hypertension.    Recent Labs   Lab 07/29/22  0218   BNP 2,059*   .

## 2022-07-29 NOTE — SUBJECTIVE & OBJECTIVE
Interval History: Patient emergently intubated overnight after developing worsening hypercarbia and higher pressor requirements. Currently on .06 epi, .04 vaso. Vent on rate control mode at 24 BPM 65% FiO2. Sent for CT scan given concerns for intraabdominal infectious source.     Continuous Infusions:   dexmedetomidine (PRECEDEX) infusion 1.4 mcg/kg/hr (07/29/22 0800)    dextrose 10 % in water (D10W)      EPINEPHrine 0.06 mcg/kg/min (07/29/22 0800)    LIDOcaine 0.25 mg/min (07/29/22 0800)    nitric oxide gas      NORepinephrine bitartrate-D5W Stopped (07/29/22 0624)    propofoL 35 mcg/kg/min (07/29/22 0800)    TPN ADULT CENTRAL LINE CUSTOM 40 mL/hr at 07/29/22 0800    TPN ADULT CENTRAL LINE CUSTOM      vasopressin 0.04 Units/min (07/29/22 0800)     Scheduled Meds:   acetylcysteine 100 mg/ml (10%)  4 mL Nebulization Q6H    albumin human 5%        amiodarone  400 mg Oral TID    aspirin  81 mg Per OG tube Daily    calcium gluconate IVPB  1 g Intravenous Once    ceFEPime (MAXIPIME) IVPB  2 g Intravenous Q12H    EPINEPHrine        guaiFENesin 100 mg/5 ml  300 mg Per NG tube Q6H    levalbuterol  0.63 mg Nebulization Q6H    magnesium citrate  148 mL Oral Once    methIMAzole  20 mg Per NG tube BID    metroNIDAZOLE  500 mg Oral Q8H    mexiletine  400 mg Oral Q8H    midodrine  15 mg Per NG tube Q8H    NORepinephrine  20 mcg Intravenous Once    pantoprazole  40 mg Intravenous BID    polyethylene glycol  17 g Per NG tube Daily    predniSONE  40 mg Per NG tube Daily    succinylcholine  1.5 mg/kg Intravenous Once    warfarin  2.5 mg Per NG tube Daily     PRN Meds:sodium chloride, sodium chloride 0.9%, sodium chloride 0.9%, acetaminophen, bisacodyL, dextrose 10 % in water (D10W), dextrose 10%, dextrose 10%, glucagon (human recombinant), HYDROmorphone, HYDROmorphone, hydrOXYzine HCL, insulin aspart U-100    Review of patient's allergies indicates:   Allergen Reactions    Lisinopril Anaphylaxis    Hydralazine analogues      Chronic  constipation, impotence, dizziness     Objective:     Vital Signs (Most Recent):  Temp: 99.5 °F (37.5 °C) (07/29/22 0900)  Pulse: 83 (07/29/22 0900)  Resp: (!) 24 (07/29/22 0900)  BP: (!) 76/0 (07/29/22 0300)  SpO2: 96 % (07/29/22 0715)   Vital Signs (24h Range):  Temp:  [97.52 °F (36.4 °C)-99.5 °F (37.5 °C)] 99.5 °F (37.5 °C)  Pulse:  [] 83  Resp:  [24-50] 24  SpO2:  [92 %-97 %] 96 %  BP: (74-80)/(0) 76/0  Arterial Line BP: (65-92)/(45-77) 86/76     Patient Vitals for the past 72 hrs (Last 3 readings):   Weight   07/29/22 0604 99 kg (218 lb 4.1 oz)   07/28/22 0930 104.8 kg (231 lb)   07/28/22 0500 104.8 kg (231 lb)     Body mass index is 28.8 kg/m².      Intake/Output Summary (Last 24 hours) at 7/29/2022 1147  Last data filed at 7/29/2022 0800  Gross per 24 hour   Intake 1933.94 ml   Output 1530 ml   Net 403.94 ml         Physical Exam  Constitutional:       Interventions: He is sedated and intubated.   HENT:      Head: Normocephalic and atraumatic.   Cardiovascular:      Rate and Rhythm: Normal rate and regular rhythm.      Comments: VAD hum.   Pulmonary:      Effort: He is intubated.      Comments: Intubated. Equal breath sounds.   Abdominal:      General: Abdomen is flat. There is no distension.      Palpations: Abdomen is soft.   Musculoskeletal:      Right lower leg: Edema present.      Left lower leg: Edema present.   Skin:     General: Skin is warm and dry.       Significant Labs:  CBC:  Recent Labs   Lab 07/28/22  1132 07/28/22  1945 07/29/22  0218   WBC 24.99* 22.85* 25.18*   RBC 2.67* 2.65* 2.55*   HGB 7.8* 7.5* 7.6*   HCT 25.5* 25.1* 25.1*   * 479* 571*   MCV 96 95 98   MCH 29.2 28.3 29.8   MCHC 30.6* 29.9* 30.3*     BNP:  Recent Labs   Lab 07/25/22  0401 07/27/22  0348 07/29/22  0218   BNP 2,301* 3,441* 2,059*     CMP:  Recent Labs   Lab 07/28/22  1132 07/28/22  1945 07/29/22  0218    104 215*   CALCIUM 9.2 9.7 9.9   ALBUMIN 2.3* 2.2* 2.3*  2.3*   PROT 6.4 6.9 6.3  6.3    141  139   K 4.5 4.4 4.8   CO2 23 22* 19*    107 104   BUN 49* 54* 68*   CREATININE 2.1* 2.3* 2.5*   ALKPHOS 134 126 127  127   ALT 50* 46* 47*  47*   AST 62* 57* 52*  52*   BILITOT 8.9* 8.3* 8.2*  8.2*      Coagulation:   Recent Labs   Lab 07/27/22  1941 07/28/22  0302 07/28/22  1132 07/28/22  1945 07/29/22  0218   INR 1.3* 1.4* 1.6* 2.0* 2.1*   APTT 40.7* 57.1* 53.7*  --   --      LDH:  Recent Labs   Lab 07/27/22  0348 07/28/22  0302 07/29/22  0218   * 441* 326*     Microbiology:  Microbiology Results (last 7 days)       Procedure Component Value Units Date/Time    Aerobic culture [[id number]] Collected: 07/25/22 1503    Order Status: Completed Specimen: Wound from Abdomen Updated: 07/29/22 1023     Aerobic Bacterial Culture No growth    Narrative:      New driveline site    Urine culture [[id number]] Collected: 07/27/22 1709    Order Status: Completed Specimen: Urine Updated: 07/29/22 0043     Urine Culture, Routine No growth    Narrative:      Specimen Source->Urine    Blood culture [[id number]] Collected: 07/26/22 1207    Order Status: Completed Specimen: Blood from Peripheral, Hand, Left Updated: 07/28/22 1412     Blood Culture, Routine No Growth to date      No Growth to date      No Growth to date    Blood culture [[id number]] Collected: 07/26/22 1200    Order Status: Completed Specimen: Blood from Peripheral, Antecubital, Right Updated: 07/28/22 1412     Blood Culture, Routine No Growth to date      No Growth to date      No Growth to date    Culture, Respiratory with Gram Stain [[id number]]  (Abnormal)  (Susceptibility) Collected: 07/26/22 1010    Order Status: Completed Specimen: Respiratory from Tracheal Aspirate Updated: 07/28/22 1029     Respiratory Culture No S aureus or Pseudomonas isolated.      KLEBSIELLA AEROGENES  Rare       Gram Stain (Respiratory) <10 epithelial cells per low power field.     Gram Stain (Respiratory) Rare WBC's     Gram Stain (Respiratory) No organisms seen    Culture,  Anaerobe [[id number]] Collected: 07/25/22 1503    Order Status: Completed Specimen: Wound from Abdomen Updated: 07/28/22 0933     Anaerobic Culture Culture in progress    Narrative:      New driveline site    Blood culture [[id number]] Collected: 07/22/22 0857    Order Status: Completed Specimen: Blood from Peripheral, Antecubital, Right Updated: 07/27/22 1012     Blood Culture, Routine No growth after 5 days.    Blood culture [[id number]] Collected: 07/22/22 0918    Order Status: Completed Specimen: Blood from Peripheral, Hand, Left Updated: 07/27/22 1012     Blood Culture, Routine No growth after 5 days.    Gram stain [[id number]] Collected: 07/25/22 1503    Order Status: Completed Specimen: Wound from Abdomen Updated: 07/26/22 0817     Gram Stain Result No WBC's      No organisms seen    Narrative:      New driveline site    Blood culture [[id number]] Collected: 07/20/22 1445    Order Status: Completed Specimen: Blood from Peripheral, Forearm, Left Updated: 07/25/22 1612     Blood Culture, Routine No growth after 5 days.    Blood culture [[id number]] Collected: 07/20/22 1450    Order Status: Completed Specimen: Blood from Wrist, Left Updated: 07/25/22 1612     Blood Culture, Routine No growth after 5 days.    Culture, VAP (BAL) [[id number]]  (Abnormal)  (Susceptibility) Collected: 07/20/22 1120    Order Status: Completed Specimen: Bronchial Alveolar Lavage from BAL, [facility] Updated: 07/23/22 1048     VAP BAL CULTURE KLEBSIELLA AEROGENES  Few  Normal respiratory sachin also present       Gram Stain (Respiratory) Few WBC's     Gram Stain (Respiratory) Rare Gram positive cocci     Gram Stain (Respiratory) Rare Gram negative rods            I have reviewed all pertinent labs within the past 24 hours.    Estimated Creatinine Clearance: 41.3 mL/min (A) (based on SCr of 2.5 mg/dL (H)).    Diagnostic Results:  I have reviewed all pertinent imaging results/findings within the past 24 hours.

## 2022-07-29 NOTE — PROCEDURES
"Tim Richards is a 56 y.o. male patient.    Temp: 100.04 °F (37.8 °C) (07/29/22 1445)  Pulse: 82 (07/29/22 1445)  Resp: (!) 24 (07/29/22 1534)  BP: (!) 80/0 (07/29/22 1100)  SpO2: 97 % (07/29/22 1100)  Weight: 99 kg (218 lb 4.1 oz) (07/29/22 0604)  Height: 6' 1" (185.4 cm) (07/28/22 0930)       Procedures    7/29/2022     Template for Bronchoscopy with Basic Procedures    DATE OF PROCEDURE: 7/22/2022     PREOPERATIVE DIAGNOSES:   Left ventricular assist device (LVAD) complication [T82.9XXA]    POSTOPERATIVE DIAGNOSES:   Left ventricular assist device (LVAD) complication [T82.9XXA]    PROCEDURES PERFORMED:   Bronchoscopy    Surgeon(s) and Role:     * Dang Amezcua MD    Informed consent:  The nature of the surgery and possible benefits explained to and appreciated by patient. The multiple complications and adverse outcomes including:surgical infection, pneumonia, air leak / fluid drainage from the lung requiring prolonged chest tube drainage, intra-thoracic abscess, bleeding, lung tumor or infection/ bleeding recurrence, cardiac arrhythmias, myocardial infarction, renal failure, prolonged gastrointestinal infection or dysfunction, respiratory failure, need for tracheostomy, pulmonary embolus, thrombophlebitis, multiple organ failure, stroke, and prolonged hospital / extended care facility stay, acute and chronic pain, the need for new chronic medical care, along with other unsuspected problems,also understood and appreciated by patient who voluntarily and in an informed state of mind agreed to this surgical intervention.    Operation Detail:    Fiberoptic Bronchoscopy was the performed introducing the bronchoscopy through the endotracheal tube. Bronchoscopy revealed open airways on the right and left with no secretions/mucus present in any of the lobes.    INTRAOPERATIVE COMPLICATIONS: none    ESTIMATED BLOOD LOSS: 0 mL    Specimens: None taken    "

## 2022-07-29 NOTE — SUBJECTIVE & OBJECTIVE
"Interval History: "OK. Trying to catch my breath". Patient remains intubated and febrile. BAL culture on 7/20 now with K aerogenes. Blood cultures remain NGTD. Yellow mucoid drainage noted at DLES. Brown yellow secretions at prior chest tube exit site. Transitioned from meropenem to cefepime on 7/25.     Extubated on 7/26. Febrile to 101.6 F on 7/27. Repeat sputum culture with K aerogenes susceptible to cefepime. Metronidazole added on 7/27. Fever resolved. Re-intubated overnight and on mechanical ventilation.     Review of Systems   Unable to perform ROS: Intubated   Objective:     Vital Signs (Most Recent):  Temp: 100.04 °F (37.8 °C) (07/29/22 1445)  Pulse: 82 (07/29/22 1445)  Resp: (!) 24 (07/29/22 1445)  BP: (!) 80/0 (07/29/22 1100)  SpO2: 97 % (07/29/22 1100)   Vital Signs (24h Range):  Temp:  [97.52 °F (36.4 °C)-100.04 °F (37.8 °C)] 100.04 °F (37.8 °C)  Pulse:  [] 82  Resp:  [24-48] 24  SpO2:  [92 %-97 %] 97 %  BP: (74-82)/(0) 80/0  Arterial Line BP: (65-92)/(45-77) 82/72     Weight: 99 kg (218 lb 4.1 oz)  Body mass index is 28.8 kg/m².    Estimated Creatinine Clearance: 36.9 mL/min (A) (based on SCr of 2.8 mg/dL (H)).    Physical Exam  Vitals and nursing note reviewed. Exam conducted with a chaperone present.   Constitutional:       General: He is sleeping.      Appearance: He is ill-appearing.      Interventions: He is sedated and intubated.   HENT:      Head: Normocephalic and atraumatic.   Eyes:      General: Scleral icterus present.         Right eye: No discharge.         Left eye: No discharge.      Conjunctiva/sclera: Conjunctivae normal.   Cardiovascular:      Pulses: Normal pulses.      Comments: Distant VAD Humming sounds  Pulmonary:      Effort: Tachypnea and accessory muscle usage present. No respiratory distress. He is intubated.      Breath sounds: No stridor. Rhonchi present. No wheezing or rales.   Abdominal:      General: Bowel sounds are decreased. There is no distension.      " Palpations: Abdomen is soft.      Tenderness: There is no abdominal tenderness.          Comments: Left sided DLES covered with gauze dressing.    Musculoskeletal:         General: Normal range of motion.   Skin:     General: Skin is warm and dry.   Neurological:      General: No focal deficit present.      Mental Status: He is easily aroused.      Comments: Awakens with verbal stimuli. Shakes head for yes/no questions.        Significant Labs: Blood Culture:   Recent Labs   Lab 07/20/22  1450 07/22/22  0857 07/22/22  0918 07/26/22  1200 07/26/22  1207   LABBLOO No growth after 5 days. No growth after 5 days. No growth after 5 days. No Growth to date  No Growth to date  No Growth to date  No Growth to date No Growth to date  No Growth to date  No Growth to date  No Growth to date       BMP:   Recent Labs   Lab 07/29/22  1233   *      K 4.2      CO2 21*   BUN 72*   CREATININE 2.8*   CALCIUM 8.6*   MG 2.3       CBC:   Recent Labs   Lab 07/28/22  1945 07/29/22  0218 07/29/22  1233   WBC 22.85* 25.18* 19.35*   HGB 7.5* 7.6* 7.3*   HCT 25.1* 25.1* 23.8*   * 571* 468*       Respiratory Culture:   Recent Labs   Lab 07/20/22  1120 07/26/22  1010   GSRESP Few WBC's  Rare Gram positive cocci  Rare Gram negative rods <10 epithelial cells per low power field.  Rare WBC's  No organisms seen   RESPIRATORYC  --  No S aureus or Pseudomonas isolated.  KLEBSIELLA AEROGENES  Rare  *       Urine Culture:   Recent Labs   Lab 07/27/22  1709   LABURIN No growth     Urine Studies:   Recent Labs   Lab 07/27/22  1709   COLORU [place]   APPEARANCEUA Cloudy*   PHUR 5.0   SPECGRAV 1.010   PROTEINUA 1+*   GLUCUA Negative   KETONESU Negative   BILIRUBINUA Negative   OCCULTUA 3+*   NITRITE Negative   LEUKOCYTESUR Negative   RBCUA >100*   WBCUA 22*   BACTERIA Moderate*   SQUAMEPITHEL 1   HYALINECASTS 7*       Wound Culture:   Recent Labs   Lab 07/25/22  1503   LABAERO No growth         Significant Imaging: I have  reviewed all pertinent imaging results/findings within the past 24 hours.

## 2022-07-29 NOTE — EICU
Intervention Initiated From:  Bedside    Esther Communicated with Bedside Nurse regarding:  Respiratory and Time-Out      Comments: Called to the room to assist with a timeout for ETT placement.    Pt given:  Etomidate 10 mg                  Propofol 40 mg                  Succinyl choline 120 mg                  Norepinephrine 16 mcg

## 2022-07-29 NOTE — PROGRESS NOTES
[facility]  Critical Care - Surgery  Progress Note    Patient Name: Tim Richards  MRN: [id number]  Admission Date: 6/27/2022  Hospital Length of Stay: 32 days  Code Status: Full Code  Attending Provider: Yg Kaufman MD  Primary Care Provider: Deyanira Booth MD   Principal Problem: Left ventricular assist device (LVAD) complication    Subjective:     Hospital/ICU Course:  No notes on file    Interval History/Significant Events: Intubated overnight due to hypercarbia. Also began to have increasing pressor requirements.     Follow-up For: Procedure(s) (LRB):  REMOVAL, FOREIGN BODY (N/A)    Post-Operative Day: 7 Days Post-Op    Objective:     Vital Signs (Most Recent):  Temp: 99.32 °F (37.4 °C) (07/29/22 0815)  Pulse: 82 (07/29/22 0815)  Resp: (!) 24 (07/29/22 0815)  BP: (!) 76/0 (07/29/22 0300)  SpO2: 96 % (07/29/22 0715)   Vital Signs (24h Range):  Temp:  [97.52 °F (36.4 °C)-99.68 °F (37.6 °C)] 99.32 °F (37.4 °C)  Pulse:  [] 82  Resp:  [24-54] 24  SpO2:  [92 %-97 %] 96 %  BP: (74-80)/(0) 76/0  Arterial Line BP: (60-92)/(45-77) 86/76     Weight: 99 kg (218 lb 4.1 oz)  Body mass index is 28.8 kg/m².      Intake/Output Summary (Last 24 hours) at 7/29/2022 0829  Last data filed at 7/29/2022 0800  Gross per 24 hour   Intake 2220.79 ml   Output 1690 ml   Net 530.79 ml       Physical Exam  Constitutional:       Comments: Intubated and sedated   HENT:      Head: Normocephalic and atraumatic.      Nose:      Comments: NG in place  Eyes:      Pupils: Pupils are equal, round, and reactive to light.   Neck:      Comments: R IJ CVC    Cardiovascular:      Rate and Rhythm: Normal rate. Rhythm irregular.      Comments: LVAD in place -- 6400 rpm, 5.0L    Midline sternotomy c/d with dressing in place      Pulmonary:      Comments: Intubated      Abdominal:      General: There is no distension.      Palpations: Abdomen is soft.      Comments: Prior driveline site packed with iodoform gauze    Genitourinary:     Comments: Lagunas in place draining clear urine  Musculoskeletal:      Comments: ECMO cannula sites with dressing in place.     Cutdown incision with seroma with wound vac in place, serous output. Changed yesterday    right radial arterial line   Skin:     General: Skin is warm and dry.   Neurological:      Comments: Sedated       Vents:  Vent Mode: A/C (07/29/22 0508)  Ventilator Initiated: Yes (07/29/22 0002)  Set Rate: 24 BPM (07/29/22 0508)  Vt Set: 450 mL (07/29/22 0508)  Pressure Support: 8 cmH20 (07/29/22 0002)  PEEP/CPAP: 7 cmH20 (07/29/22 0508)  Oxygen Concentration (%): 65 (07/29/22 0815)  Peak Airway Pressure: 34 cmH2O (07/29/22 0508)  Plateau Pressure: 0 cmH20 (07/29/22 0508)  Total Ve: 10.5 mL (07/29/22 0508)  Negative Inspiratory Force (cm H2O): 0 (07/29/22 0508)  F/VT Ratio<105 (RSBI): (!) 50.63 (07/29/22 0508)    Lines/Drains/Airways       Central Venous Catheter Line  Duration             Percutaneous Central Line Insertion/Assessment - Quad Lumen  07/21/22 1515 right internal jugular 7 days              Drain  Duration                  NG/OG Tube 07/15/22 1030 Left nostril 13 days         Urethral Catheter 07/27/22 1536 Temperature probe 16 Fr. 1 day              Airway  Duration                  Airway - Non-Surgical 07/29/22 0042 <1 day       Airway Anesthesia 07/29/22 <1 day              Arterial Line  Duration             Arterial Line 07/13/22 2000 Right Radial 15 days              Line  Duration                  VAD 07/13/22 1300 Left ventricular assist device HeartMate 3 15 days              Peripheral Intravenous Line  Duration                  Midline Catheter Insertion/Assessment  - Single Lumen 07/21/22 1616 Left basilic vein (medial side of arm) 18g x 10cm 7 days         Peripheral IV - Single Lumen 07/29/22 0238 20 G Right Forearm <1 day                    Significant Labs:    CBC/Anemia Profile:  Recent Labs   Lab 07/28/22  1132 07/28/22  1945 07/29/22  0218   WBC  24.99* 22.85* 25.18*   HGB 7.8* 7.5* 7.6*   HCT 25.5* 25.1* 25.1*   * 479* 571*   MCV 96 95 98   RDW 21.7* 22.5* 22.5*        Chemistries:  Recent Labs   Lab 07/28/22  1132 07/28/22 1945 07/29/22 0218    141 139   K 4.5 4.4 4.8    107 104   CO2 23 22* 19*   BUN 49* 54* 68*   CREATININE 2.1* 2.3* 2.5*   CALCIUM 9.2 9.7 9.9   ALBUMIN 2.3* 2.2* 2.3*  2.3*   PROT 6.4 6.9 6.3  6.3   BILITOT 8.9* 8.3* 8.2*  8.2*   ALKPHOS 134 126 127  127   ALT 50* 46* 47*  47*   AST 62* 57* 52*  52*   MG 2.4 2.4 2.4   PHOS 4.7* 5.4* 7.0*       ABGs:   Recent Labs   Lab 07/29/22  0403   PH 7.267*   PCO2 48.3*   HCO3 22.0*   POCSATURATED 99   BE -5     Coagulation:   Recent Labs   Lab 07/28/22 1132 07/28/22 1945 07/29/22 0218   INR 1.6*   < > 2.1*   APTT 53.7*  --   --     < > = values in this interval not displayed.     All pertinent labs within the past 24 hours have been reviewed.    Significant Imaging:  I have reviewed all pertinent imaging results/findings within the past 24 hours.    Assessment/Plan:     Chronic combined systolic and diastolic heart failure  Tim Richards is a 56 y.o. male HFrEF with an EF of 10% and a history of HM2 LVAD in 2018 who is now s/p HM3 upgrade, initiation of V-A ECMO after failure to wean off bypass x2      Neuro/Psych:   -- Sedation: precedex, propofol   -- Pain: PRN Dilaudid             Cards:   -- S/P LVAD exchange on 7/14/22  --Improving pressor requirements, wean gwen for MAP > 75, keep other pressors the same   Epi 0.06   Levo 0.07   Vaso 0.04   Giapreza off  -- Continue Midodrine 15 mg Q8  -- Stress dose steroids complete  -- MAP goal 75  -- VAD  flows stable  -- Decannulated on 7/19  -- Sternal closure on 7/15  -- Bedside drive line removal on 7/23  -- EP consult due to v-tach  -- Continue lidocaine, continue amio ggt, restarted mexiletine      Pulm:   -- Goal O2 sat > 90%  -- ABG PRN  -- Chest tubes removed  -- Nitric weaned at 20  -- Extubated 7/26, re  intubated 7/28  -- Monitor O2 sat with ABGs      Renal:  -- Keep sun for strict I/O  -- Trend BUN/Cr  -- Lasix gtt 10/hr        FEN / GI:   -- Replace lytes as needed  -- Nutrition: NPO,TPN  -- GI ppx: PPI  -- Bowel reg: miralax, docusate, mag citrate      ID:   -- Tm: febrile; WBC increasing   -- ABX cefepime, falgyl  -- Cultures sent, repeat cultures sent 7/22, no growth to date, repeat cultures sent 7/26 --> gram negative rods on sputum cultures  -- BAL GNR --> pansensitive klebsiella   -- Repeat sputum cultures --> pansensitive klebsiella  -- ID consult   -- daily vanc levels  -- Line exchange 7/21  -- UA with many bacteria and WBCs, culture pending  -- consider CT scan due to ongoing intermittent fevers and increasing WBC      Heme/Onc:   -- H/H stable   -- Daily CBC  -- Received 18 pRBCs, 16 FFP, 2 plt, 25 cryo; 1500 albumin intra-op  -- Heparin gtt at 2500, goal anti Xa 0.3  -- Warfarin 2.5mg      Endo:   -- BG goal 140-180  -- SSI  -- Levothyroxine discontinued  -- hyperthyroid despite being hypothyroid at home  -- Endocrine consulted  -- Started on prednisone and methimazole       PPx:   Feeding: NPO, TPN  Analgesia/Sedation: Precedex, propofol / PRN Dilaudid  Thromboembolic prevention: SCDs, heparin gtt  HOB >30: Yes  Stress Ulcer ppx: PPI  Glucose control: Critical care goal 140-180 g/dl, ISS     Lines/Drains/Airway: NG; RIJ CVC, r-radial a-line, sun; WV, VAD; midline      Dispo/Code Status/Palliative:   -- SICU / Full Code.              Dang Amezcua MD  Critical Care - Surgery  [facility]

## 2022-07-29 NOTE — ANESTHESIA PROCEDURE NOTES
Ad Hoc Intubation    Date/Time: 7/29/2022 12:33 AM  Performed by: Noah Erickson MD  Authorized by: Kerri Drake MD     Indications:  Hypoxemia and hypercapnia  Diagnosis:  Hypoxic hypercapnic respiratory failure  Patient Location:  ICU  Timeout:  12/29/2022 12:30 AM  Procedure Start Time:  7/29/2022 12:31 AM  Procedure End Time:  7/29/2022 12:33 AM  Staff:     Anesthesiologist Present: Yes    Intubation:     Induction:  Rapid sequence induction    Intubated:  Postinduction    Mask Ventilation:  N/a    Attempts:  1    Attempted By:  Resident anesthesiologist    Blade:  Booth 3    Laryngeal View Grade: Grade I - full view of chords      Difficult Airway Encountered?: No      Complications:  None    Airway Device:  Oral endotracheal tube    Airway Device Size:  8.0    Style/Cuff Inflation:  Cuffed (inflated to minimal occlusive pressure)    Tube secured:  23    Secured at:  The lips    Placement Verified By:  Capnometry    Complicating Factors:  None    Findings Post-Intubation:  BS equal bilateral

## 2022-07-29 NOTE — ASSESSMENT & PLAN NOTE
Patient with leukocytosis (25K), previous fevers of unknown source.  Getting CT scan to look for abdominal or pulmonary source of sepsis.

## 2022-07-29 NOTE — PROGRESS NOTES
07/29/2022  Carissa Dean    Current provider:  Yg Kaufman MD    Device interrogation:  TXP LVAD INTERROGATIONS 7/29/2022 7/29/2022 7/29/2022 7/29/2022 7/29/2022 7/29/2022 7/29/2022   Type HeartMate3 HeartMate3 HeartMate3 HeartMate3 HeartMate3 HeartMate3 HeartMate3   Flow 5.3 5.4 5.2 5 5.1 5.2 5.1   Speed 6200 6200 6000 6000 6000 6000 6000   PI 3.4 3.1 3.7 3.6 3.3 3.5 3.4   Power (Jackson) 5.1 5.1 4.8 4.6 4.5 4.8 4.8   LSL 5800 5800 5600 5600 5600 5600 5600   Low Flow Alarm - - - - - - -   Pulsatility - - Intermittent pulse - - - Intermittent pulse          Rounded on Tim Richards to ensure all mechanical assist device settings (IABP or VAD) were appropriate and all parameters were within limits.  I was able to ensure all back up equipment was present, the staff had no issues, and the Perfusion Department daily rounding was complete.      For implantable VADs: Interrogation of Ventricular assist device was performed with analysis of device parameters and review of device function. I have personally reviewed the interrogation findings and agree with findings as stated.     In emergency, the nursing units have been notified to contact the perfusion department either by:  Calling [phone] from 630am to 4pm Mon thru Fri, utilizing the On-Call Finder functionality of Epic and searching for Perfusion, or by contacting the hospital  from 4pm to 630am and on weekends and asking to speak with the perfusionist on call.    2:05 PM

## 2022-07-29 NOTE — PLAN OF CARE
"Shift Events: remains intubated/sedated on nitric. lido, levo weaned off. CT chest/abd/pelvis and bronch completed. VAD speed changed to 6200. Flows 5.1-5.4, PI 3.4-3.9, Power 4.8-5.1.     Neuro: Sedated, Follows Commands and Moves All Extremities.    Vital Signs: BP (!) 82/0 (BP Location: Right arm, Patient Position: Lying)   Pulse 81   Temp (!) 100.76 °F (38.2 °C) (Core Bladder)   Resp (!) 24   Ht 6' 1" (1.854 m)   Wt 99 kg (218 lb 4.1 oz)   SpO2 95%   BMI 28.80 kg/m²     Respiratory: Ventilator with NiO 60% 8 PEEP    Diet: TPN    Gtts: Propfol, Precedex, Vasopressin, Epinephrine, Amiodarone, Lasix and Heparin    Urine Output: Urinary Catheter 1500 cc/shift    Drains: Wound vac with no output           "

## 2022-07-29 NOTE — PROGRESS NOTES
07/29/22 1330   WOCN Assessment   WOCN Total Time (mins) 20   Visit Date 07/29/22   Visit Time 1330   Consult Type New   WOCN Speciality Wound   Intervention assessed;changed;chart review;orders;coordination of care   Teaching on-going        Incision/Site 07/22/22 1135 Right Abdomen   Date First Assessed/Time First Assessed: 07/22/22 1135   Side: Right  Location: Abdomen   Dressing Appearance Dry;Intact   Drainage Amount Scant   Drainage Characteristics/Odor Serosanguineous   Appearance Red   Red (%), Wound Tissue Color 100 %   Periwound Area Intact   Wound Edges Defined   Wound Length (cm) 3.5 cm   Wound Width (cm) 4.5 cm   Wound Depth (cm) 1.9 cm   Wound Volume (cm^3) 29.925 cm^3   Wound Surface Area (cm^2) 15.75 cm^2   Undermining (depth (cm)/location) 1-2=1.7   Care Cleansed with:;Sterile normal saline   Dressing Changed;Gauze;Island/border  (iodosorb)   Packing gauze, iodoform   Periwound Care Skin barrier film applied   Wound consult performed.  Recommendations: R abd--clean with NS, Pack loosely with Iodoform, cover with gauze, and cover with island drsg.  Tolerated with no s/s pain.  Primary nurse consulted with CTS they will follow R groin wound to NPWT.  Will sign off. Please re consult if needed.

## 2022-07-29 NOTE — PT/OT/SLP PROGRESS
Occupational Therapy      Patient Name:  Tim Richards   MRN:  [id number]    Patient not seen today secondary to  (pt not seen due to re-intubated and sedations). Will follow-up at a later date.    7/29/2022

## 2022-07-29 NOTE — ASSESSMENT & PLAN NOTE
The patient presented with COVID and found to have an uptrending LDH with increased power.  He underwent HM III upgrade on 7/13/22  -Daily LDH   -Continue Heparin drip    Procedure: Device Interrogation Including analysis of device parameters  Current Settings: Ventricular Assist Device    TXP LVAD INTERROGATIONS 7/29/2022 7/29/2022 7/29/2022 7/29/2022 7/29/2022 7/29/2022 7/29/2022   Type HeartMate3 HeartMate3 HeartMate3 HeartMate3 HeartMate3 HeartMate3 HeartMate3   Flow 5.2 5.1 5.0 5.0 5.1 5.2 5.1   Speed 6000 6000 6000 6000 6000 6000 6000   PI 3.5 3.4 3.4 3.4 3.4 3.5 2.4   Power (Jackson) 4.8 4.8 4.7 4.7 4.7 4.7 5.5   LSL 5600 5600 5600 5600 5600 5600 6000   Low Flow Alarm - - - - - - -   Pulsatility - Intermittent pulse Intermittent pulse Intermittent pulse Intermittent pulse Intermittent pulse Intermittent pulse

## 2022-07-29 NOTE — PT/OT/SLP PROGRESS
Speech Language Pathology      Tim Richards  MRN: [id number]    Pt emergently intubated and no longer appropriate for skilled speech therapy services. Current orders discontinued and speech service to be reconsulted once medical appropriate.     Ayesha Vora MS, CCC-SLP  Speech Language Pathologist  Pager: [phone]  Date 7/29/2022

## 2022-07-29 NOTE — PT/OT/SLP PROGRESS
Physical Therapy      Patient Name:  Tim Richards   MRN:  [id number]    Patient not seen today secondary to  (pt not seen due to intubated and sedations). Will follow-up at a later date.    7/29/2022  .

## 2022-07-29 NOTE — ASSESSMENT & PLAN NOTE
Procedure: Device Interrogation Including analysis of device parameters  Current Settings: Ventricular Assist Device  Review of device function is stable    TXP LVAD INTERROGATIONS 7/29/2022 7/29/2022 7/29/2022 7/29/2022 7/29/2022 7/29/2022 7/29/2022   Type HeartMate3 HeartMate3 HeartMate3 HeartMate3 HeartMate3 HeartMate3 HeartMate3   Flow 5.2 5.1 5.0 5.0 5.1 5.2 5.1   Speed 6000 6000 6000 6000 6000 6000 6000   PI 3.5 3.4 3.4 3.4 3.4 3.5 2.4   Power (Jackson) 4.8 4.8 4.7 4.7 4.7 4.7 5.5   LSL 5600 5600 5600 5600 5600 5600 6000   Low Flow Alarm - - - - - - -   Pulsatility - Intermittent pulse Intermittent pulse Intermittent pulse Intermittent pulse Intermittent pulse Intermittent pulse

## 2022-07-29 NOTE — PROGRESS NOTES
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 Underwent LVAD pump exchange. Post-op course complicated by worsening cardiogenic shock/RV failure requiring VA-ECMO. Improved clinically underwent successful decannulation. Continued episodes of VT with ICD shock 7/21 and ongoing anti-arrhythmic mediation adjustments. TFTs on 7/27 significant for undetectable TSH and elevated fT4.     Endocrinology was consulted for hyperthyroidism.       With regards to amiodarone induced hypothyroidism:  Last seen outpatient by Dr Piedra on 3/29/2022.    He initially developed amiodarone-induced hyperthyroidism (Type 2 AIT) in 7/2019. He required a prolonged course of PDN and MMI, then subsequently developed hypothyroidism in 5/2020. LT4 was adjusted based on serial TFT measurements. Most recently levothyroxine dose has been 112 mcg.     He self-decreased his amiodarone dose to 200 mg daily about 6 months ago. T4 was high on 7/13, but he was continued on levothyroxine 112 mcg.      Interval HPI:   Overnight events: Progressive hypercarbia yesterday leading to reintubation  Eating:   NPO  Nausea: No  Hypoglycemia and intervention: No  Fever: Yes, 99F  TPN and/or TF: No  If yes, type of TF/TPN and rate: n/a    BP (!) 76/0 (BP Location: Right arm, Patient Position: Lying)   Pulse 83   Temp 99.5 °F (37.5 °C)   Resp (!) 24   Ht 6' 1" (1.854 m)   Wt 99 kg (218 lb 4.1 oz)   SpO2 96%   BMI 28.80 kg/m²     Labs Reviewed and Include    Recent Labs   Lab 07/29/22  0218   *   CALCIUM 9.9   ALBUMIN 2.3*  2.3*   PROT 6.3  6.3      K 4.8   CO2 19*      BUN 68*   CREATININE 2.5*   ALKPHOS 127  127   ALT " 47*  47*   AST 52*  52*   BILITOT 8.2*  8.2*     Lab Results   Component Value Date    WBC 25.18 (H) 07/29/2022    HGB 7.6 (L) 07/29/2022    HCT 25.1 (L) 07/29/2022    MCV 98 07/29/2022     (H) 07/29/2022     Recent Labs   Lab 07/27/22  0348 07/29/22  0218   TSH <0.010*  --    FREET4 2.45* 2.71*     Lab Results   Component Value Date    HGBA1C 5.4 04/26/2021       Nutritional status:   Body mass index is 28.8 kg/m².  Lab Results   Component Value Date    ALBUMIN 2.3 (L) 07/29/2022    ALBUMIN 2.3 (L) 07/29/2022    ALBUMIN 2.2 (L) 07/28/2022     Lab Results   Component Value Date    PREALBUMIN 13 (L) 07/29/2022    PREALBUMIN 11 (L) 07/17/2022    PREALBUMIN 10 (L) 07/15/2022       Estimated Creatinine Clearance: 41.3 mL/min (A) (based on SCr of 2.5 mg/dL (H)).    Accu-Checks  No results for input(s): POCTGLUCOSE in the last 72 hours.    Current Medications and/or Treatments Impacting Glycemic Control  Immunotherapy:    Immunosuppressants       None          Steroids:   Hormones (From admission, onward)                Start     Stop Route Frequency Ordered    07/28/22 1545  predniSONE tablet 40 mg         -- PER NG TUBE Daily 07/28/22 1542    07/15/22 0900  vasopressin (PITRESSIN) 1 Units/mL in dextrose 5 % 100 mL infusion         -- IV Continuous 07/15/22 0900          Pressors:    Autonomic Drugs (From admission, onward)                Start     Stop Route Frequency Ordered    07/29/22 0030  succinylcholine injection 158 mg         -- IV Once 07/29/22 0123    07/29/22 0030  NORepinephrine injection 20 mcg         -- IV Once 07/29/22 0123    07/29/22 0011  EPINEPHrine (ADRENALIN) 1 mg/mL injection        Note to Pharmacy: Created by cabinet override    07/29 1214   07/29/22 0011    07/29/22 0007  EPINEPHrine 5 mg in dextrose 5% 250 mL infusion (premix)        Question Answer Comment   Begin at (in mcg/kg/min): 0.01    Titrate by: (in mcg/kg/min) 0.01    Titrate interval: (in minutes) 10    Titrate to  maintain: (SBP or MAP or Cardiac Index) MAP    Greater than: (in mmHg) 65    Maximum dose: (in mcg/kg/min) 2        -- IV Continuous 07/29/22 0008    07/23/22 1400  midodrine tablet 15 mg         -- PER NG TUBE Every 8 hours 07/23/22 0944    07/15/22 0900  NORepinephrine 8 mg in dextrose 5% 250 mL infusion        Question Answer Comment   Begin at (in mcg/kg/min): 0.1    Titrate by: (in mcg/kg/min) 0.02    Titrate interval: (in minutes) 20    Titrate to maintain: (MAP or SBP) MAP    Greater than: (in mmHg) 65    Maximum dose: (in mcg/kg/min) 0.2        -- IV Continuous 07/15/22 0900          Hyperglycemia/Diabetes Medications:   Antihyperglycemics (From admission, onward)                Start     Stop Route Frequency Ordered    07/29/22 1225  insulin aspart U-100 pen 1-10 Units         -- SubQ Every 4 hours PRN 07/29/22 1129            ASSESSMENT and PLAN    * Left ventricular assist device (LVAD) complication  LVAD in place, continues to have runs of VT and remains on amiodarone  Management by primary      WAGNER (acute kidney injury)        Advance care planning        Anxiety        History of ventricular tachycardia        COVID-19 virus infection        Diuretic-induced hypokalemia        Dilated cardiomyopathy        Essential hypertension        Elevated serum lactate dehydrogenase (LDH)        Amiodarone-induced hyperthyroidism  -Pt with hx of Amiodarone induced hyperthyroidism type 2, then developed Hypothyroidism.  - TRAb and TSI - negative. US unremarkable with low vascularity.   - After previous presentation of AIT, he was weaned off methimazole and prednisone by 5/2020  - Then developed hypothyroidism, LT4 started and dose titrated per TFTs    - Prior to current admission he was on LT4 112 mcg qd  - Labs c/w hypothyroidism until current admission, initially on 7/13, with low TSH and elevated fT4. Repeat TFTs on 7/27 with worsening hyperthyroidism. He continued to receive LT4 112 mcg through 7/28.  - He  remains on amiodarone for episodes of VT  - Suspect current hyperthyroidism is AIT, however continued exogenous LT4 exacerbated/contributed to current presentation       Recommendations:  - Although mechanism unknown, strongly suspect AIT (type 1 or 2) -- will start treatment for AIT type 2 empirically due to clinical implications in delayed treatment. Ultimately steroids and methimazole would be weaned based on response   - Methimazole 20mg BID and Prednisone 40mg qd  - Daily fT4 and T3  - We discontinued levothyroxine    Heart replaced by heart assist device        amiodarone induced hypothyrodism  After previous AIT he becme hypothyroid and required thyroid replacement   Levothyroxine discontinued on 7/28/2022    Class 1 obesity due to excess calories with serious comorbidity and body mass index (BMI) of 32.0 to 32.9 in adult        VT (ventricular tachycardia)        Shortness of breath        History of bleeding ulcers        Leukocytosis        Anticoagulation monitoring, INR range 2-3        LVAD (left ventricular assist device) present  LVAD in place, continues to have runs of VT and remains on amiodarone  Management by primary      Palliative care encounter        Hypertensive cardiovascular-renal disease, stage 1-4 or unspecified chronic kidney disease, with heart failure        CKD (chronic kidney disease), stage III        Acute decompensated heart failure        High risk medications (amiodarone) long-term use  Will need close monitoring of thyroid function indefinitely as he remains reliant on amiodarone     Chronic combined systolic and diastolic heart failure  Managed per primary      Acute on chronic systolic congestive heart failure  Echo    Interpretation Summary  · There is an LVAD present. Base speed is 6400 RPMs. The pump type is a Heartmate III. The interventricular septum appears midline. The aortic valve does not open.  · The left ventricle is severely enlarged with eccentric hypertrophy and  severely decreased systolic function.  · The estimated ejection fraction is 13%.  · There is abnormal septal wall motion.  · There is severe left ventricular global hypokinesis.  · Left ventricular diastolic dysfunction.  · Mild left atrial enlargement.  · Moderate right ventricular enlargement with mildly reduced right ventricular systolic function.  · Moderate right atrial enlargement.  · Moderate tricuspid regurgitation.  · Intermediate central venous pressure (8 mmHg).  · The estimated PA systolic pressure is 45 mmHg.  · There is pulmonary hypertension.    Recent Labs   Lab 07/29/22  0218   BNP 2,059*   .          Ja Garrido MD  Endocrinology  [facility]

## 2022-07-29 NOTE — ASSESSMENT & PLAN NOTE
ADHF  Underwent HM III upgrade on 7/13/22, currently on VA ECMO  Currently on Epi gtt, Vasopressin  Currently on Precedex and propofol gtt for sedation  Being treated for sepsis of unknown origen.   Currently intubated and sedated (reintubarted 7/29)  Chest tube removal, bronch, and old driveline removed 7/22

## 2022-07-29 NOTE — PROGRESS NOTES
2015: map decreased to 59, Levo gtt restarted, Brisa CHAMBERLAIN notified  2200: levo increased to 0.02, abg obtained, MD aware of pt vs and lab/abg results   2300: abg repeated, levo increased to .04, md at the bedside   2330: levo increased to .08, pi 1.5 then increased to 2, CTS fellow contacted, epi gtt started, vad speed changed to 6000, calcium chloride pushed, nitric increased to 20, decision made to intubate,  0000: pt intubated per anesthesia, prop, precedex gtt started

## 2022-07-29 NOTE — PROGRESS NOTES
[facility]  Heart Transplant  Progress Note    Patient Name: Tim Richards  MRN: [id number]  Admission Date: 6/27/2022  Hospital Length of Stay: 32 days  Attending Physician: Yg Kaufman MD  Primary Care Provider: Deyanira Booth MD  Principal Problem:Left ventricular assist device (LVAD) complication    Subjective:     Interval History: Patient emergently intubated overnight after developing worsening hypercarbia and higher pressor requirements. Currently on .06 epi, .04 vaso. Vent on rate control mode at 24 BPM 65% FiO2. Sent for CT scan given concerns for intraabdominal infectious source.     Continuous Infusions:   dexmedetomidine (PRECEDEX) infusion 1.4 mcg/kg/hr (07/29/22 0800)    dextrose 10 % in water (D10W)      EPINEPHrine 0.06 mcg/kg/min (07/29/22 0800)    LIDOcaine 0.25 mg/min (07/29/22 0800)    nitric oxide gas      NORepinephrine bitartrate-D5W Stopped (07/29/22 0624)    propofoL 35 mcg/kg/min (07/29/22 0800)    TPN ADULT CENTRAL LINE CUSTOM 40 mL/hr at 07/29/22 0800    TPN ADULT CENTRAL LINE CUSTOM      vasopressin 0.04 Units/min (07/29/22 0800)     Scheduled Meds:   acetylcysteine 100 mg/ml (10%)  4 mL Nebulization Q6H    albumin human 5%        amiodarone  400 mg Oral TID    aspirin  81 mg Per OG tube Daily    calcium gluconate IVPB  1 g Intravenous Once    ceFEPime (MAXIPIME) IVPB  2 g Intravenous Q12H    EPINEPHrine        guaiFENesin 100 mg/5 ml  300 mg Per NG tube Q6H    levalbuterol  0.63 mg Nebulization Q6H    magnesium citrate  148 mL Oral Once    methIMAzole  20 mg Per NG tube BID    metroNIDAZOLE  500 mg Oral Q8H    mexiletine  400 mg Oral Q8H    midodrine  15 mg Per NG tube Q8H    NORepinephrine  20 mcg Intravenous Once    pantoprazole  40 mg Intravenous BID    polyethylene glycol  17 g Per NG tube Daily    predniSONE  40 mg Per NG tube Daily    succinylcholine  1.5 mg/kg Intravenous Once    warfarin  2.5 mg Per NG tube Daily      PRN Meds:sodium chloride, sodium chloride 0.9%, sodium chloride 0.9%, acetaminophen, bisacodyL, dextrose 10 % in water (D10W), dextrose 10%, dextrose 10%, glucagon (human recombinant), HYDROmorphone, HYDROmorphone, hydrOXYzine HCL, insulin aspart U-100    Review of patient's allergies indicates:   Allergen Reactions    Lisinopril Anaphylaxis    Hydralazine analogues      Chronic constipation, impotence, dizziness     Objective:     Vital Signs (Most Recent):  Temp: 99.5 °F (37.5 °C) (07/29/22 0900)  Pulse: 83 (07/29/22 0900)  Resp: (!) 24 (07/29/22 0900)  BP: (!) 76/0 (07/29/22 0300)  SpO2: 96 % (07/29/22 0715)   Vital Signs (24h Range):  Temp:  [97.52 °F (36.4 °C)-99.5 °F (37.5 °C)] 99.5 °F (37.5 °C)  Pulse:  [] 83  Resp:  [24-50] 24  SpO2:  [92 %-97 %] 96 %  BP: (74-80)/(0) 76/0  Arterial Line BP: (65-92)/(45-77) 86/76     Patient Vitals for the past 72 hrs (Last 3 readings):   Weight   07/29/22 0604 99 kg (218 lb 4.1 oz)   07/28/22 0930 104.8 kg (231 lb)   07/28/22 0500 104.8 kg (231 lb)     Body mass index is 28.8 kg/m².      Intake/Output Summary (Last 24 hours) at 7/29/2022 1147  Last data filed at 7/29/2022 0800  Gross per 24 hour   Intake 1933.94 ml   Output 1530 ml   Net 403.94 ml         Physical Exam  Constitutional:       Interventions: He is sedated and intubated.   HENT:      Head: Normocephalic and atraumatic.   Cardiovascular:      Rate and Rhythm: Normal rate and regular rhythm.      Comments: VAD hum.   Pulmonary:      Effort: He is intubated.      Comments: Intubated. Equal breath sounds.   Abdominal:      General: Abdomen is flat. There is no distension.      Palpations: Abdomen is soft.   Musculoskeletal:      Right lower leg: Edema present.      Left lower leg: Edema present.   Skin:     General: Skin is warm and dry.       Significant Labs:  CBC:  Recent Labs   Lab 07/28/22  1132 07/28/22  1945 07/29/22  0218   WBC 24.99* 22.85* 25.18*   RBC 2.67* 2.65* 2.55*   HGB 7.8* 7.5* 7.6*    HCT 25.5* 25.1* 25.1*   * 479* 571*   MCV 96 95 98   MCH 29.2 28.3 29.8   MCHC 30.6* 29.9* 30.3*     BNP:  Recent Labs   Lab 07/25/22  0401 07/27/22  0348 07/29/22  0218   BNP 2,301* 3,441* 2,059*     CMP:  Recent Labs   Lab 07/28/22  1132 07/28/22 1945 07/29/22 0218    104 215*   CALCIUM 9.2 9.7 9.9   ALBUMIN 2.3* 2.2* 2.3*  2.3*   PROT 6.4 6.9 6.3  6.3    141 139   K 4.5 4.4 4.8   CO2 23 22* 19*    107 104   BUN 49* 54* 68*   CREATININE 2.1* 2.3* 2.5*   ALKPHOS 134 126 127  127   ALT 50* 46* 47*  47*   AST 62* 57* 52*  52*   BILITOT 8.9* 8.3* 8.2*  8.2*      Coagulation:   Recent Labs   Lab 07/27/22  1941 07/28/22  0302 07/28/22  1132 07/28/22 1945 07/29/22 0218   INR 1.3* 1.4* 1.6* 2.0* 2.1*   APTT 40.7* 57.1* 53.7*  --   --      LDH:  Recent Labs   Lab 07/27/22  0348 07/28/22  0302 07/29/22  0218   * 441* 326*     Microbiology:  Microbiology Results (last 7 days)       Procedure Component Value Units Date/Time    Aerobic culture [[id number]] Collected: 07/25/22 1503    Order Status: Completed Specimen: Wound from Abdomen Updated: 07/29/22 1023     Aerobic Bacterial Culture No growth    Narrative:      New driveline site    Urine culture [[id number]] Collected: 07/27/22 1709    Order Status: Completed Specimen: Urine Updated: 07/29/22 0043     Urine Culture, Routine No growth    Narrative:      Specimen Source->Urine    Blood culture [[id number]] Collected: 07/26/22 1207    Order Status: Completed Specimen: Blood from Peripheral, Hand, Left Updated: 07/28/22 1412     Blood Culture, Routine No Growth to date      No Growth to date      No Growth to date    Blood culture [[id number]] Collected: 07/26/22 1200    Order Status: Completed Specimen: Blood from Peripheral, Antecubital, Right Updated: 07/28/22 1412     Blood Culture, Routine No Growth to date      No Growth to date      No Growth to date    Culture, Respiratory with Gram Stain [[id number]]  (Abnormal)   (Susceptibility) Collected: 07/26/22 1010    Order Status: Completed Specimen: Respiratory from Tracheal Aspirate Updated: 07/28/22 1029     Respiratory Culture No S aureus or Pseudomonas isolated.      KLEBSIELLA AEROGENES  Rare       Gram Stain (Respiratory) <10 epithelial cells per low power field.     Gram Stain (Respiratory) Rare WBC's     Gram Stain (Respiratory) No organisms seen    Culture, Anaerobe [[id number]] Collected: 07/25/22 1503    Order Status: Completed Specimen: Wound from Abdomen Updated: 07/28/22 0933     Anaerobic Culture Culture in progress    Narrative:      New driveline site    Blood culture [[id number]] Collected: 07/22/22 0857    Order Status: Completed Specimen: Blood from Peripheral, Antecubital, Right Updated: 07/27/22 1012     Blood Culture, Routine No growth after 5 days.    Blood culture [[id number]] Collected: 07/22/22 0918    Order Status: Completed Specimen: Blood from Peripheral, Hand, Left Updated: 07/27/22 1012     Blood Culture, Routine No growth after 5 days.    Gram stain [[id number]] Collected: 07/25/22 1503    Order Status: Completed Specimen: Wound from Abdomen Updated: 07/26/22 0817     Gram Stain Result No WBC's      No organisms seen    Narrative:      New driveline site    Blood culture [[id number]] Collected: 07/20/22 1445    Order Status: Completed Specimen: Blood from Peripheral, Forearm, Left Updated: 07/25/22 1612     Blood Culture, Routine No growth after 5 days.    Blood culture [[id number]] Collected: 07/20/22 1450    Order Status: Completed Specimen: Blood from Wrist, Left Updated: 07/25/22 1612     Blood Culture, Routine No growth after 5 days.    Culture, VAP (BAL) [[id number]]  (Abnormal)  (Susceptibility) Collected: 07/20/22 1120    Order Status: Completed Specimen: Bronchial Alveolar Lavage from BAL, L Updated: 07/23/22 1048     VAP BAL CULTURE KLEBSIELLA AEROGENES  Few  Normal respiratory sachin also present       Gram Stain (Respiratory) Few WBC's     Gram  Stain (Respiratory) Rare Gram positive cocci     Gram Stain (Respiratory) Rare Gram negative rods            I have reviewed all pertinent labs within the past 24 hours.    Estimated Creatinine Clearance: 41.3 mL/min (A) (based on SCr of 2.5 mg/dL (H)).    Diagnostic Results:  I have reviewed all pertinent imaging results/findings within the past 24 hours.    Assessment and Plan:     57 Y/O M with Hx of stage D HFrEF (EF 10%) due to NICM underwent HM2 implant 3/8/18 and exchange of outflow graft 3/9/18, Hx VT/VF s/p SICD 2014 presenting with gradual worsening shortness of breath associated with nonpleuritic, nonradiating bilateral 4/10 retrosternal chest pressure pain.  Symptoms began yesterday and have gradually worsened in the last 12 hours.  He does report going to a family picnic with increased consumption of chicken wings, ribs and other salty meat products.  Prior to symptom onset, he reports nausea, nonbloody diarrhea began yesterday and single episode of nonbloody nonbilious vomiting this morning. He also complains of dizziness, lightheadedness, and a mild HA. SOB worsened this morning prompting him to come to the ED.     In the ED, patient was in respiratory distress requiring BiPAP. MAP 96 and started on Nitro gtt. Work-up revealed WBC 10, K 5.4, Cr 2.5 (baseline 1.9), BNP 1950 (baseline 200-300s), Bili 2.1, LDH 1058, and INR 3.2. Bedside TTE with severely reduced EF, AV not opening, septum bulging into RV. Given IV Lasix 80 mg x1 with only 100-200 mL UOP and dark in color. HM2 interrogated and flows have been 8.5-9 L/min with no alarms or power surges. Cardiology consulted in the ED, dicussed with HTS, and decision made to admit to ICU for further mgmt.       * Left ventricular assist device (LVAD) complication  The patient presented with COVID and found to have an uptrending LDH with increased power.  He underwent HM III upgrade on 7/13/22  -Daily LDH   -Continue Heparin drip    Procedure: Device  Interrogation Including analysis of device parameters  Current Settings: Ventricular Assist Device    TXP LVAD INTERROGATIONS 7/29/2022 7/29/2022 7/29/2022 7/29/2022 7/29/2022 7/29/2022 7/29/2022   Type HeartMate3 HeartMate3 HeartMate3 HeartMate3 HeartMate3 HeartMate3 HeartMate3   Flow 5.2 5.1 5.0 5.0 5.1 5.2 5.1   Speed 6000 6000 6000 6000 6000 6000 6000   PI 3.5 3.4 3.4 3.4 3.4 3.5 2.4   Power (Jackson) 4.8 4.8 4.7 4.7 4.7 4.7 5.5   LSL 5600 5600 5600 5600 5600 5600 6000   Low Flow Alarm - - - - - - -   Pulsatility - Intermittent pulse Intermittent pulse Intermittent pulse Intermittent pulse Intermittent pulse Intermittent pulse       Anxiety  -Currently intubated and sedated    History of ventricular tachycardia  Patient experienced an episode of VT on 6/30 and experienced an ICD shock thought to be related to hypokalemia (K of 3.1 on 6/30)  - Aggressively replete K, keep K>4 and Mg>2  - Continue mexiletine PO, lidocaine gtt, and amiodarone gtt   - EP signed off and recommending Amiodarone and Mexilitine, so would try to wean Lidocaine gtt if able  - Continue to monitor on telemetry    COVID-19 virus infection  -Previously hypoxic then recovered  -ID was consulted, recommended Remdesivir, course completed    Elevated serum lactate dehydrogenase (LDH)  S/p HM3 exchange for HM2 pump thrombosis  Trend LDH daily    amiodarone induced hypothyrodism  -Continue levothyroxine 112 mcg     Leukocytosis  Patient with leukocytosis (25K), previous fevers of unknown source.  Getting CT scan to look for abdominal or pulmonary source of sepsis.     LVAD (left ventricular assist device) present  Procedure: Device Interrogation Including analysis of device parameters  Current Settings: Ventricular Assist Device  Review of device function is stable    TXP LVAD INTERROGATIONS 7/29/2022 7/29/2022 7/29/2022 7/29/2022 7/29/2022 7/29/2022 7/29/2022   Type HeartMate3 HeartMate3 HeartMate3 HeartMate3 HeartMate3 HeartMate3 HeartMate3   Flow  5.2 5.1 5.0 5.0 5.1 5.2 5.1   Speed 6000 6000 6000 6000 6000 6000 6000   PI 3.5 3.4 3.4 3.4 3.4 3.5 2.4   Power (Jackson) 4.8 4.8 4.7 4.7 4.7 4.7 5.5   LSL 5600 5600 5600 5600 5600 5600 6000   Low Flow Alarm - - - - - - -   Pulsatility - Intermittent pulse Intermittent pulse Intermittent pulse Intermittent pulse Intermittent pulse Intermittent pulse       CKD (chronic kidney disease), stage III  WAGNER on CKD  Patient's baseline is 1.5-2.0  - Nephrology is following  - Avoiding nephrotoxic medications  - Continue to monitor  - Strict I/O's    Chronic combined systolic and diastolic heart failure  ADHF  Underwent HM III upgrade on 7/13/22, currently on VA ECMO  Currently on Epi gtt, Vasopressin  Currently on Precedex and propofol gtt for sedation  Being treated for sepsis of unknown origen.   Currently intubated and sedated (reintubarted 7/29)  Chest tube removal, bronch, and old driveline removed 7/22          Yadiel Coley PA-C  Heart Transplant  [facility]

## 2022-07-29 NOTE — RESPIRATORY THERAPY
Pt. Emergently intubated with size 8.0ETT secured 24cm at lips. Currently on AC ventilatory support.

## 2022-07-29 NOTE — SUBJECTIVE & OBJECTIVE
Interval History/Significant Events: Intubated overnight due to hypercarbia. Also began to have increasing pressor requirements.     Follow-up For: Procedure(s) (LRB):  REMOVAL, FOREIGN BODY (N/A)    Post-Operative Day: 7 Days Post-Op    Objective:     Vital Signs (Most Recent):  Temp: 99.32 °F (37.4 °C) (07/29/22 0815)  Pulse: 82 (07/29/22 0815)  Resp: (!) 24 (07/29/22 0815)  BP: (!) 76/0 (07/29/22 0300)  SpO2: 96 % (07/29/22 0715)   Vital Signs (24h Range):  Temp:  [97.52 °F (36.4 °C)-99.68 °F (37.6 °C)] 99.32 °F (37.4 °C)  Pulse:  [] 82  Resp:  [24-54] 24  SpO2:  [92 %-97 %] 96 %  BP: (74-80)/(0) 76/0  Arterial Line BP: (60-92)/(45-77) 86/76     Weight: 99 kg (218 lb 4.1 oz)  Body mass index is 28.8 kg/m².      Intake/Output Summary (Last 24 hours) at 7/29/2022 0829  Last data filed at 7/29/2022 0800  Gross per 24 hour   Intake 2220.79 ml   Output 1690 ml   Net 530.79 ml       Physical Exam  Constitutional:       Comments: Intubated and sedated   HENT:      Head: Normocephalic and atraumatic.      Nose:      Comments: NG in place  Eyes:      Pupils: Pupils are equal, round, and reactive to light.   Neck:      Comments: R IJ CVC    Cardiovascular:      Rate and Rhythm: Normal rate. Rhythm irregular.      Comments: LVAD in place -- 6400 rpm, 5.0L    Midline sternotomy c/d with dressing in place      Pulmonary:      Comments: Intubated      Abdominal:      General: There is no distension.      Palpations: Abdomen is soft.      Comments: Prior driveline site packed with iodoform gauze   Genitourinary:     Comments: Lagunas in place draining clear urine  Musculoskeletal:      Comments: ECMO cannula sites with dressing in place.     Cutdown incision with seroma with wound vac in place, serous output. Changed yesterday    right radial arterial line   Skin:     General: Skin is warm and dry.   Neurological:      Comments: Sedated       Vents:  Vent Mode: A/C (07/29/22 3322)  Ventilator Initiated: Yes (07/29/22  0002)  Set Rate: 24 BPM (07/29/22 0508)  Vt Set: 450 mL (07/29/22 0508)  Pressure Support: 8 cmH20 (07/29/22 0002)  PEEP/CPAP: 7 cmH20 (07/29/22 0508)  Oxygen Concentration (%): 65 (07/29/22 0815)  Peak Airway Pressure: 34 cmH2O (07/29/22 0508)  Plateau Pressure: 0 cmH20 (07/29/22 0508)  Total Ve: 10.5 mL (07/29/22 0508)  Negative Inspiratory Force (cm H2O): 0 (07/29/22 0508)  F/VT Ratio<105 (RSBI): (!) 50.63 (07/29/22 0508)    Lines/Drains/Airways       Central Venous Catheter Line  Duration             Percutaneous Central Line Insertion/Assessment - Quad Lumen  07/21/22 1515 right internal jugular 7 days              Drain  Duration                  NG/OG Tube 07/15/22 1030 Left nostril 13 days         Urethral Catheter 07/27/22 1536 Temperature probe 16 Fr. 1 day              Airway  Duration                  Airway - Non-Surgical 07/29/22 0042 <1 day       Airway Anesthesia 07/29/22 <1 day              Arterial Line  Duration             Arterial Line 07/13/22 2000 Right Radial 15 days              Line  Duration                  VAD 07/13/22 1300 Left ventricular assist device HeartMate 3 15 days              Peripheral Intravenous Line  Duration                  Midline Catheter Insertion/Assessment  - Single Lumen 07/21/22 1616 Left basilic vein (medial side of arm) 18g x 10cm 7 days         Peripheral IV - Single Lumen 07/29/22 0238 20 G Right Forearm <1 day                    Significant Labs:    CBC/Anemia Profile:  Recent Labs   Lab 07/28/22  1132 07/28/22 1945 07/29/22 0218   WBC 24.99* 22.85* 25.18*   HGB 7.8* 7.5* 7.6*   HCT 25.5* 25.1* 25.1*   * 479* 571*   MCV 96 95 98   RDW 21.7* 22.5* 22.5*        Chemistries:  Recent Labs   Lab 07/28/22  1132 07/28/22 1945 07/29/22 0218    141 139   K 4.5 4.4 4.8    107 104   CO2 23 22* 19*   BUN 49* 54* 68*   CREATININE 2.1* 2.3* 2.5*   CALCIUM 9.2 9.7 9.9   ALBUMIN 2.3* 2.2* 2.3*  2.3*   PROT 6.4 6.9 6.3  6.3   BILITOT 8.9* 8.3* 8.2*   8.2*   ALKPHOS 134 126 127  127   ALT 50* 46* 47*  47*   AST 62* 57* 52*  52*   MG 2.4 2.4 2.4   PHOS 4.7* 5.4* 7.0*       ABGs:   Recent Labs   Lab 07/29/22  0403   PH 7.267*   PCO2 48.3*   HCO3 22.0*   POCSATURATED 99   BE -5     Coagulation:   Recent Labs   Lab 07/28/22  1132 07/28/22  1945 07/29/22  0218   INR 1.6*   < > 2.1*   APTT 53.7*  --   --     < > = values in this interval not displayed.     All pertinent labs within the past 24 hours have been reviewed.    Significant Imaging:  I have reviewed all pertinent imaging results/findings within the past 24 hours.

## 2022-07-29 NOTE — SUBJECTIVE & OBJECTIVE
"Interval HPI:   Overnight events: Progressive hypercarbia yesterday leading to reintubation  Eating:   NPO  Nausea: No  Hypoglycemia and intervention: No  Fever: Yes, 99F  TPN and/or TF: No  If yes, type of TF/TPN and rate: n/a    BP (!) 76/0 (BP Location: Right arm, Patient Position: Lying)   Pulse 83   Temp 99.5 °F (37.5 °C)   Resp (!) 24   Ht 6' 1" (1.854 m)   Wt 99 kg (218 lb 4.1 oz)   SpO2 96%   BMI 28.80 kg/m²     Labs Reviewed and Include    Recent Labs   Lab 07/29/22 0218   *   CALCIUM 9.9   ALBUMIN 2.3*  2.3*   PROT 6.3  6.3      K 4.8   CO2 19*      BUN 68*   CREATININE 2.5*   ALKPHOS 127  127   ALT 47*  47*   AST 52*  52*   BILITOT 8.2*  8.2*     Lab Results   Component Value Date    WBC 25.18 (H) 07/29/2022    HGB 7.6 (L) 07/29/2022    HCT 25.1 (L) 07/29/2022    MCV 98 07/29/2022     (H) 07/29/2022     Recent Labs   Lab 07/27/22  0348 07/29/22 0218   TSH <0.010*  --    FREET4 2.45* 2.71*     Lab Results   Component Value Date    HGBA1C 5.4 04/26/2021       Nutritional status:   Body mass index is 28.8 kg/m².  Lab Results   Component Value Date    ALBUMIN 2.3 (L) 07/29/2022    ALBUMIN 2.3 (L) 07/29/2022    ALBUMIN 2.2 (L) 07/28/2022     Lab Results   Component Value Date    PREALBUMIN 13 (L) 07/29/2022    PREALBUMIN 11 (L) 07/17/2022    PREALBUMIN 10 (L) 07/15/2022       Estimated Creatinine Clearance: 41.3 mL/min (A) (based on SCr of 2.5 mg/dL (H)).    Accu-Checks  No results for input(s): POCTGLUCOSE in the last 72 hours.    Current Medications and/or Treatments Impacting Glycemic Control  Immunotherapy:    Immunosuppressants       None          Steroids:   Hormones (From admission, onward)                Start     Stop Route Frequency Ordered    07/28/22 1545  predniSONE tablet 40 mg         -- PER NG TUBE Daily 07/28/22 1542    07/15/22 0900  vasopressin (PITRESSIN) 1 Units/mL in dextrose 5 % 100 mL infusion         -- IV Continuous 07/15/22 0900      "     Pressors:    Autonomic Drugs (From admission, onward)                Start     Stop Route Frequency Ordered    07/29/22 0030  succinylcholine injection 158 mg         -- IV Once 07/29/22 0123    07/29/22 0030  NORepinephrine injection 20 mcg         -- IV Once 07/29/22 0123    07/29/22 0011  EPINEPHrine (ADRENALIN) 1 mg/mL injection        Note to Pharmacy: Created by cabinet override    07/29 1214   07/29/22 0011    07/29/22 0007  EPINEPHrine 5 mg in dextrose 5% 250 mL infusion (premix)        Question Answer Comment   Begin at (in mcg/kg/min): 0.01    Titrate by: (in mcg/kg/min) 0.01    Titrate interval: (in minutes) 10    Titrate to maintain: (SBP or MAP or Cardiac Index) MAP    Greater than: (in mmHg) 65    Maximum dose: (in mcg/kg/min) 2        -- IV Continuous 07/29/22 0008    07/23/22 1400  midodrine tablet 15 mg         -- PER NG TUBE Every 8 hours 07/23/22 0944    07/15/22 0900  NORepinephrine 8 mg in dextrose 5% 250 mL infusion        Question Answer Comment   Begin at (in mcg/kg/min): 0.1    Titrate by: (in mcg/kg/min) 0.02    Titrate interval: (in minutes) 20    Titrate to maintain: (MAP or SBP) MAP    Greater than: (in mmHg) 65    Maximum dose: (in mcg/kg/min) 0.2        -- IV Continuous 07/15/22 0900          Hyperglycemia/Diabetes Medications:   Antihyperglycemics (From admission, onward)                Start     Stop Route Frequency Ordered    07/29/22 1225  insulin aspart U-100 pen 1-10 Units         -- SubQ Every 4 hours PRN 07/29/22 1129

## 2022-07-29 NOTE — PROGRESS NOTES
[facility] - Surgical Intensive Care  Infectious Disease  Progress Note    Patient Name: Tim Richards  MRN: [id number]  Admission Date: 6/27/2022  Length of Stay: 32 days  Attending Physician: Yg Kaufman MD  Primary Care Provider: Deyanira Booth MD    Isolation Status: No active isolations  Assessment/Plan:      Shock  Likely multifactorial . On multiple pressor support.   · Management per primary.    Leukocytosis  Leukocytosis with associated fever post-decannulation. Blcx so far ngtd, resp cx unrevealing. Pt is at risk for fungal infection (prior lines, multiple surgeries). S/P HM3 with removal of old driveline. Up-escalated to meropenem due to acute decompensation.     Sputum cultures with K aerogenes. Meropenem transitioned to cefepime on 7/25.  DLES cultures performed on 7/25 are no growth. Repeat sputum cultures with K aerogenes susceptible to cefepime. Afebrile and with ongoing to slightly down trending leukocytosis. Intubated overnight. CT imaging without intraabdominal collections or ischemic gut.   · Continue cefepime.  · Continue metronidazole for anaerobic coverage.  · Treat for 7-14 days from 7/26/22.      Anticipated Disposition: per primary    Thank you for your consult. I will sign off. Please contact us if you have any additional questions.    Roxie Garg MD  Infectious Disease  [facility] - Surgical Intensive Care    Subjective:     Principal Problem:Left ventricular assist device (LVAD) complication    HPI: A 56-year-old man with HFrEF-10%, s/p VAD HM2 (March 2018), ICD, VT on amiodarone who developed malaise, anorexia, SOB, dark urine, and soft stools 3-4 days prior to admission. [facility] was [facility] at which time he tested positive for Covid-19 infection. He was admitted for further management and started on Remdesivir treatment protocol. Since admission, he feels significantly improved with bowel movements returning to normal and the shortness of breath subsiding.  "    Infectious Diseases consulted for "covid infection, acute. Patient with LVAD"    ID reconsulted 7/21 for "sepsis and consideration for fungemia". Since pt was last seen by ID, patient underwent AV repair, redo sternotomy, explant of old lvad HM2 with implantation of HM3, and placed on VA-ECMO, takeback 7/14 for mediastinal washout/hematoma, and washout, sternal closure, creation of moises-pericardium (gerotex membrane) and wound vac placement on 7/15. Decannulated ECMO on 7/19 with subsequent fever and hypotension. Pt was placed on broad spectrum abx. Blcx obtained from 7/20 ngtd. S/p bronch on 7/20 - cx with normal respiratory sachin.           Interval History: "OK. Trying to catch my breath". Patient remains intubated and febrile. BAL culture on 7/20 now with K aerogenes. Blood cultures remain NGTD. Yellow mucoid drainage noted at DLES. Brown yellow secretions at prior chest tube exit site. Transitioned from meropenem to cefepime on 7/25.     Extubated on 7/26. Febrile to 101.6 F on 7/27. Repeat sputum culture with K aerogenes susceptible to cefepime. Metronidazole added on 7/27. Fever resolved. Re-intubated overnight and on mechanical ventilation.     Review of Systems   Unable to perform ROS: Intubated   Objective:     Vital Signs (Most Recent):  Temp: 100.04 °F (37.8 °C) (07/29/22 1445)  Pulse: 82 (07/29/22 1445)  Resp: (!) 24 (07/29/22 1445)  BP: (!) 80/0 (07/29/22 1100)  SpO2: 97 % (07/29/22 1100)   Vital Signs (24h Range):  Temp:  [97.52 °F (36.4 °C)-100.04 °F (37.8 °C)] 100.04 °F (37.8 °C)  Pulse:  [] 82  Resp:  [24-48] 24  SpO2:  [92 %-97 %] 97 %  BP: (74-82)/(0) 80/0  Arterial Line BP: (65-92)/(45-77) 82/72     Weight: 99 kg (218 lb 4.1 oz)  Body mass index is 28.8 kg/m².    Estimated Creatinine Clearance: 36.9 mL/min (A) (based on SCr of 2.8 mg/dL (H)).    Physical Exam  Vitals and nursing note reviewed. Exam conducted with a chaperone present.   Constitutional:       General: He is sleeping.      " Appearance: He is ill-appearing.      Interventions: He is sedated and intubated.   HENT:      Head: Normocephalic and atraumatic.   Eyes:      General: Scleral icterus present.         Right eye: No discharge.         Left eye: No discharge.      Conjunctiva/sclera: Conjunctivae normal.   Cardiovascular:      Pulses: Normal pulses.      Comments: Distant VAD Humming sounds  Pulmonary:      Effort: Tachypnea and accessory muscle usage present. No respiratory distress. He is intubated.      Breath sounds: No stridor. Rhonchi present. No wheezing or rales.   Abdominal:      General: Bowel sounds are decreased. There is no distension.      Palpations: Abdomen is soft.      Tenderness: There is no abdominal tenderness.          Comments: Left sided DLES covered with gauze dressing.    Musculoskeletal:         General: Normal range of motion.   Skin:     General: Skin is warm and dry.   Neurological:      General: No focal deficit present.      Mental Status: He is easily aroused.      Comments: Awakens with verbal stimuli. Shakes head for yes/no questions.        Significant Labs: Blood Culture:   Recent Labs   Lab 07/20/22  1450 07/22/22  0857 07/22/22  0918 07/26/22  1200 07/26/22  1207   LABBLOO No growth after 5 days. No growth after 5 days. No growth after 5 days. No Growth to date  No Growth to date  No Growth to date  No Growth to date No Growth to date  No Growth to date  No Growth to date  No Growth to date       BMP:   Recent Labs   Lab 07/29/22  1233   *      K 4.2      CO2 21*   BUN 72*   CREATININE 2.8*   CALCIUM 8.6*   MG 2.3       CBC:   Recent Labs   Lab 07/28/22  1945 07/29/22  0218 07/29/22  1233   WBC 22.85* 25.18* 19.35*   HGB 7.5* 7.6* 7.3*   HCT 25.1* 25.1* 23.8*   * 571* 468*       Respiratory Culture:   Recent Labs   Lab 07/20/22  1120 07/26/22  1010   GSRESP Few WBC's  Rare Gram positive cocci  Rare Gram negative rods <10 epithelial cells per low power field.   Rare WBC's  No organisms seen   RESPIRATORYC  --  No S aureus or Pseudomonas isolated.  KLEBSIELLA AEROGENES  Rare  *       Urine Culture:   Recent Labs   Lab 07/27/22  1709   LABURIN No growth     Urine Studies:   Recent Labs   Lab 07/27/22  1709   COLORU Carolin   APPEARANCEUA Cloudy*   PHUR 5.0   SPECGRAV 1.010   PROTEINUA 1+*   GLUCUA Negative   KETONESU Negative   BILIRUBINUA Negative   OCCULTUA 3+*   NITRITE Negative   LEUKOCYTESUR Negative   RBCUA >100*   WBCUA 22*   BACTERIA Moderate*   SQUAMEPITHEL 1   HYALINECASTS 7*       Wound Culture:   Recent Labs   Lab 07/25/22  1503   LABAERO No growth         Significant Imaging: I have reviewed all pertinent imaging results/findings within the past 24 hours.

## 2022-07-29 NOTE — ASSESSMENT & PLAN NOTE
Leukocytosis with associated fever post-decannulation. Blcx so far ngtd, resp cx unrevealing. Pt is at risk for fungal infection (prior lines, multiple surgeries). S/P HM3 with removal of old driveline. Up-escalated to meropenem due to acute decompensation.     Sputum cultures with K aerogenes. Meropenem transitioned to cefepime on 7/25.  DLES cultures performed on 7/25 are no growth. Repeat sputum cultures with K aerogenes susceptible to cefepime. Afebrile and with ongoing to slightly down trending leukocytosis. Intubated overnight. CT imaging without intraabdominal collections or ischemic gut.   · Continue cefepime.  · Continue metronidazole for anaerobic coverage.  · Treat for 7-14 days from 7/26/22.

## 2022-07-30 PROBLEM — I48.92 ATRIAL FLUTTER: Status: ACTIVE | Noted: 2022-07-30

## 2022-07-30 LAB
ALBUMIN SERPL BCP-MCNC: 2 G/DL (ref 3.5–5.2)
ALBUMIN SERPL BCP-MCNC: 2.1 G/DL (ref 3.5–5.2)
ALBUMIN SERPL BCP-MCNC: 2.1 G/DL (ref 3.5–5.2)
ALLENS TEST: ABNORMAL
ALLENS TEST: NORMAL
ALP SERPL-CCNC: 103 U/L (ref 55–135)
ALP SERPL-CCNC: 112 U/L (ref 55–135)
ALP SERPL-CCNC: 95 U/L (ref 55–135)
ALT SERPL W/O P-5'-P-CCNC: 32 U/L (ref 10–44)
ALT SERPL W/O P-5'-P-CCNC: 35 U/L (ref 10–44)
ALT SERPL W/O P-5'-P-CCNC: 38 U/L (ref 10–44)
ANION GAP SERPL CALC-SCNC: 12 MMOL/L (ref 8–16)
ANION GAP SERPL CALC-SCNC: 13 MMOL/L (ref 8–16)
ANION GAP SERPL CALC-SCNC: 15 MMOL/L (ref 8–16)
APTT BLDCRRT: 35 SEC (ref 21–32)
AST SERPL-CCNC: 38 U/L (ref 10–40)
AST SERPL-CCNC: 44 U/L (ref 10–40)
AST SERPL-CCNC: 46 U/L (ref 10–40)
BASOPHILS # BLD AUTO: 0.01 K/UL (ref 0–0.2)
BASOPHILS # BLD AUTO: 0.02 K/UL (ref 0–0.2)
BASOPHILS # BLD AUTO: 0.02 K/UL (ref 0–0.2)
BASOPHILS NFR BLD: 0.1 % (ref 0–1.9)
BILIRUB SERPL-MCNC: 6.3 MG/DL (ref 0.1–1)
BILIRUB SERPL-MCNC: 7 MG/DL (ref 0.1–1)
BILIRUB SERPL-MCNC: 7.1 MG/DL (ref 0.1–1)
BUN SERPL-MCNC: 86 MG/DL (ref 6–20)
BUN SERPL-MCNC: 87 MG/DL (ref 6–20)
BUN SERPL-MCNC: 91 MG/DL (ref 6–20)
CA-I BLDV-SCNC: 1.12 MMOL/L (ref 1.06–1.42)
CALCIUM SERPL-MCNC: 8.9 MG/DL (ref 8.7–10.5)
CALCIUM SERPL-MCNC: 9.2 MG/DL (ref 8.7–10.5)
CALCIUM SERPL-MCNC: 9.3 MG/DL (ref 8.7–10.5)
CHLORIDE SERPL-SCNC: 105 MMOL/L (ref 95–110)
CHLORIDE SERPL-SCNC: 106 MMOL/L (ref 95–110)
CHLORIDE SERPL-SCNC: 107 MMOL/L (ref 95–110)
CO2 SERPL-SCNC: 19 MMOL/L (ref 23–29)
CO2 SERPL-SCNC: 20 MMOL/L (ref 23–29)
CO2 SERPL-SCNC: 21 MMOL/L (ref 23–29)
CREAT SERPL-MCNC: 2.5 MG/DL (ref 0.5–1.4)
CREAT SERPL-MCNC: 2.8 MG/DL (ref 0.5–1.4)
CREAT SERPL-MCNC: 2.9 MG/DL (ref 0.5–1.4)
DELSYS: ABNORMAL
DELSYS: NORMAL
DIFFERENTIAL METHOD: ABNORMAL
EOSINOPHIL # BLD AUTO: 0 K/UL (ref 0–0.5)
EOSINOPHIL NFR BLD: 0.1 % (ref 0–8)
ERYTHROCYTE [DISTWIDTH] IN BLOOD BY AUTOMATED COUNT: 21.8 % (ref 11.5–14.5)
ERYTHROCYTE [DISTWIDTH] IN BLOOD BY AUTOMATED COUNT: 21.9 % (ref 11.5–14.5)
ERYTHROCYTE [DISTWIDTH] IN BLOOD BY AUTOMATED COUNT: 22.2 % (ref 11.5–14.5)
ERYTHROCYTE [SEDIMENTATION RATE] IN BLOOD BY WESTERGREN METHOD: 24 MM/H
EST. GFR  (AFRICAN AMERICAN): 26.9 ML/MIN/1.73 M^2
EST. GFR  (AFRICAN AMERICAN): 28.1 ML/MIN/1.73 M^2
EST. GFR  (AFRICAN AMERICAN): 32.2 ML/MIN/1.73 M^2
EST. GFR  (NON AFRICAN AMERICAN): 23.3 ML/MIN/1.73 M^2
EST. GFR  (NON AFRICAN AMERICAN): 24.3 ML/MIN/1.73 M^2
EST. GFR  (NON AFRICAN AMERICAN): 27.9 ML/MIN/1.73 M^2
FACT X PPP CHRO-ACNC: 0.15 IU/ML (ref 0.3–0.7)
FACT X PPP CHRO-ACNC: 0.21 IU/ML (ref 0.3–0.7)
FACT X PPP CHRO-ACNC: <0.1 IU/ML (ref 0.3–0.7)
FIBRINOGEN PPP-MCNC: 738 MG/DL (ref 182–400)
FIO2: 100
FIO2: 100
FIO2: 60
GLUCOSE SERPL-MCNC: 135 MG/DL (ref 70–110)
GLUCOSE SERPL-MCNC: 158 MG/DL (ref 70–110)
GLUCOSE SERPL-MCNC: 170 MG/DL (ref 70–110)
HCO3 UR-SCNC: 21.4 MMOL/L (ref 24–28)
HCO3 UR-SCNC: 22.5 MMOL/L (ref 24–28)
HCO3 UR-SCNC: 24.7 MMOL/L (ref 24–28)
HCO3 UR-SCNC: 24.7 MMOL/L (ref 24–28)
HCT VFR BLD AUTO: 24.4 % (ref 40–54)
HCT VFR BLD AUTO: 24.8 % (ref 40–54)
HCT VFR BLD AUTO: 26.7 % (ref 40–54)
HGB BLD-MCNC: 7.4 G/DL (ref 14–18)
HGB BLD-MCNC: 7.5 G/DL (ref 14–18)
HGB BLD-MCNC: 8.1 G/DL (ref 14–18)
IMM GRANULOCYTES # BLD AUTO: 0.25 K/UL (ref 0–0.04)
IMM GRANULOCYTES # BLD AUTO: 0.29 K/UL (ref 0–0.04)
IMM GRANULOCYTES # BLD AUTO: 0.3 K/UL (ref 0–0.04)
IMM GRANULOCYTES NFR BLD AUTO: 1.5 % (ref 0–0.5)
IMM GRANULOCYTES NFR BLD AUTO: 1.7 % (ref 0–0.5)
IMM GRANULOCYTES NFR BLD AUTO: 1.9 % (ref 0–0.5)
INR PPP: 1.4 (ref 0.8–1.2)
INR PPP: 1.5 (ref 0.8–1.2)
INR PPP: 1.6 (ref 0.8–1.2)
LDH SERPL L TO P-CCNC: 1.16 MMOL/L (ref 0.36–1.25)
LDH SERPL L TO P-CCNC: 1.17 MMOL/L (ref 0.36–1.25)
LDH SERPL L TO P-CCNC: 1.18 MMOL/L (ref 0.36–1.25)
LDH SERPL L TO P-CCNC: 1.36 MMOL/L (ref 0.36–1.25)
LDH SERPL L TO P-CCNC: 329 U/L (ref 110–260)
LYMPHOCYTES # BLD AUTO: 0.6 K/UL (ref 1–4.8)
LYMPHOCYTES # BLD AUTO: 0.6 K/UL (ref 1–4.8)
LYMPHOCYTES # BLD AUTO: 0.7 K/UL (ref 1–4.8)
LYMPHOCYTES NFR BLD: 3.2 % (ref 18–48)
LYMPHOCYTES NFR BLD: 4.2 % (ref 18–48)
LYMPHOCYTES NFR BLD: 4.4 % (ref 18–48)
MAGNESIUM SERPL-MCNC: 2.4 MG/DL (ref 1.6–2.6)
MAGNESIUM SERPL-MCNC: 2.6 MG/DL (ref 1.6–2.6)
MAGNESIUM SERPL-MCNC: 2.8 MG/DL (ref 1.6–2.6)
MCH RBC QN AUTO: 28.1 PG (ref 27–31)
MCH RBC QN AUTO: 28.7 PG (ref 27–31)
MCH RBC QN AUTO: 29 PG (ref 27–31)
MCHC RBC AUTO-ENTMCNC: 30.2 G/DL (ref 32–36)
MCHC RBC AUTO-ENTMCNC: 30.3 G/DL (ref 32–36)
MCHC RBC AUTO-ENTMCNC: 30.3 G/DL (ref 32–36)
MCV RBC AUTO: 93 FL (ref 82–98)
MCV RBC AUTO: 95 FL (ref 82–98)
MCV RBC AUTO: 96 FL (ref 82–98)
METHEMOGLOBIN: 0.5 % (ref 0–3)
MIN VOL: 10.5
MIN VOL: 10.7
MIN VOL: 12.9
MIN VOL: 12.9
MIN VOL: 13.7
MIN VOL: 9.5
MIN VOL: 9.7
MODE: ABNORMAL
MODE: NORMAL
MONOCYTES # BLD AUTO: 1.5 K/UL (ref 0.3–1)
MONOCYTES # BLD AUTO: 1.6 K/UL (ref 0.3–1)
MONOCYTES # BLD AUTO: 1.6 K/UL (ref 0.3–1)
MONOCYTES NFR BLD: 10 % (ref 4–15)
MONOCYTES NFR BLD: 9.3 % (ref 4–15)
MONOCYTES NFR BLD: 9.4 % (ref 4–15)
NEUTROPHILS # BLD AUTO: 12.5 K/UL (ref 1.8–7.7)
NEUTROPHILS # BLD AUTO: 14 K/UL (ref 1.8–7.7)
NEUTROPHILS # BLD AUTO: 15 K/UL (ref 1.8–7.7)
NEUTROPHILS NFR BLD: 83.7 % (ref 38–73)
NEUTROPHILS NFR BLD: 84.5 % (ref 38–73)
NEUTROPHILS NFR BLD: 85.6 % (ref 38–73)
NRBC BLD-RTO: 1 /100 WBC
NRBC BLD-RTO: 2 /100 WBC
NRBC BLD-RTO: 3 /100 WBC
PCO2 BLDA: 35.5 MMHG (ref 35–45)
PCO2 BLDA: 40 MMHG (ref 35–45)
PCO2 BLDA: 44.2 MMHG (ref 35–45)
PCO2 BLDA: 51 MMHG (ref 35–45)
PEEP: 10
PEEP: 8
PEEP: 8
PH SMN: 7.29 [PH] (ref 7.35–7.45)
PH SMN: 7.36 [PH] (ref 7.35–7.45)
PH SMN: 7.36 [PH] (ref 7.35–7.45)
PH SMN: 7.39 [PH] (ref 7.35–7.45)
PHOSPHATE SERPL-MCNC: 2.6 MG/DL (ref 2.7–4.5)
PHOSPHATE SERPL-MCNC: 3.5 MG/DL (ref 2.7–4.5)
PHOSPHATE SERPL-MCNC: 3.8 MG/DL (ref 2.7–4.5)
PIP: 14
PIP: 14
PIP: 24
PIP: 32
PIP: 32
PIP: 36
PIP: 37
PIP: 37
PLATELET # BLD AUTO: 426 K/UL (ref 150–450)
PLATELET # BLD AUTO: 434 K/UL (ref 150–450)
PLATELET # BLD AUTO: 461 K/UL (ref 150–450)
PMV BLD AUTO: 10.9 FL (ref 9.2–12.9)
PMV BLD AUTO: 10.9 FL (ref 9.2–12.9)
PMV BLD AUTO: 11.2 FL (ref 9.2–12.9)
PO2 BLDA: 206 MMHG (ref 80–100)
PO2 BLDA: 233 MMHG (ref 80–100)
PO2 BLDA: 396 MMHG (ref 80–100)
PO2 BLDA: 78 MMHG (ref 80–100)
POC BE: -1 MMOL/L
POC BE: -2 MMOL/L
POC BE: -3 MMOL/L
POC BE: -4 MMOL/L
POC SATURATED O2: 100 % (ref 95–100)
POC SATURATED O2: 95 % (ref 95–100)
POC TCO2: 22 MMOL/L (ref 23–27)
POC TCO2: 24 MMOL/L (ref 23–27)
POC TCO2: 26 MMOL/L (ref 23–27)
POC TCO2: 26 MMOL/L (ref 23–27)
POTASSIUM SERPL-SCNC: 3.7 MMOL/L (ref 3.5–5.1)
POTASSIUM SERPL-SCNC: 4.2 MMOL/L (ref 3.5–5.1)
POTASSIUM SERPL-SCNC: 4.2 MMOL/L (ref 3.5–5.1)
PROT SERPL-MCNC: 6.7 G/DL (ref 6–8.4)
PROT SERPL-MCNC: 6.8 G/DL (ref 6–8.4)
PROT SERPL-MCNC: 7 G/DL (ref 6–8.4)
PROTHROMBIN TIME: 14.8 SEC (ref 9–12.5)
PROTHROMBIN TIME: 15 SEC (ref 9–12.5)
PROTHROMBIN TIME: 16 SEC (ref 9–12.5)
RBC # BLD AUTO: 2.55 M/UL (ref 4.6–6.2)
RBC # BLD AUTO: 2.61 M/UL (ref 4.6–6.2)
RBC # BLD AUTO: 2.88 M/UL (ref 4.6–6.2)
SAMPLE: ABNORMAL
SAMPLE: NORMAL
SITE: ABNORMAL
SITE: NORMAL
SODIUM SERPL-SCNC: 139 MMOL/L (ref 136–145)
SODIUM SERPL-SCNC: 139 MMOL/L (ref 136–145)
SODIUM SERPL-SCNC: 140 MMOL/L (ref 136–145)
SP02: 95
SP02: 99
T3 SERPL-MCNC: 70 NG/DL (ref 60–180)
T4 FREE SERPL-MCNC: 3.25 NG/DL (ref 0.71–1.51)
VT: 450
VT: 550
WBC # BLD AUTO: 14.92 K/UL (ref 3.9–12.7)
WBC # BLD AUTO: 16.55 K/UL (ref 3.9–12.7)
WBC # BLD AUTO: 17.45 K/UL (ref 3.9–12.7)

## 2022-07-30 PROCEDURE — 99233 PR SUBSEQUENT HOSPITAL CARE,LEVL III: ICD-10-PCS | Mod: ,,, | Performed by: INTERNAL MEDICINE

## 2022-07-30 PROCEDURE — 31624 DX BRONCHOSCOPE/LAVAGE: CPT

## 2022-07-30 PROCEDURE — 94640 AIRWAY INHALATION TREATMENT: CPT

## 2022-07-30 PROCEDURE — 99233 SBSQ HOSP IP/OBS HIGH 50: CPT | Mod: ,,, | Performed by: ANESTHESIOLOGY

## 2022-07-30 PROCEDURE — 25000003 PHARM REV CODE 250

## 2022-07-30 PROCEDURE — 20000000 HC ICU ROOM

## 2022-07-30 PROCEDURE — 83735 ASSAY OF MAGNESIUM: CPT | Mod: 91 | Performed by: THORACIC SURGERY (CARDIOTHORACIC VASCULAR SURGERY)

## 2022-07-30 PROCEDURE — 84480 ASSAY TRIIODOTHYRONINE (T3): CPT | Performed by: THORACIC SURGERY (CARDIOTHORACIC VASCULAR SURGERY)

## 2022-07-30 PROCEDURE — 36620 INSERTION CATHETER ARTERY: CPT | Mod: ,,, | Performed by: ANESTHESIOLOGY

## 2022-07-30 PROCEDURE — 25000003 PHARM REV CODE 250: Performed by: STUDENT IN AN ORGANIZED HEALTH CARE EDUCATION/TRAINING PROGRAM

## 2022-07-30 PROCEDURE — 63600367 HC NITRIC OXIDE PER HOUR

## 2022-07-30 PROCEDURE — 99232 PR SUBSEQUENT HOSPITAL CARE,LEVL II: ICD-10-PCS | Mod: ,,, | Performed by: INTERNAL MEDICINE

## 2022-07-30 PROCEDURE — 63600175 PHARM REV CODE 636 W HCPCS: Performed by: THORACIC SURGERY (CARDIOTHORACIC VASCULAR SURGERY)

## 2022-07-30 PROCEDURE — 93750 PR INTERROGATE VENT ASSIST DEV, IN PERSON, W PHYSICIAN ANALYSIS: ICD-10-PCS | Mod: ,,, | Performed by: INTERNAL MEDICINE

## 2022-07-30 PROCEDURE — 27000248 HC VAD-ADDITIONAL DAY

## 2022-07-30 PROCEDURE — 36620 ARTERIAL LINE: ICD-10-PCS | Mod: ,,, | Performed by: ANESTHESIOLOGY

## 2022-07-30 PROCEDURE — 83050 HGB METHEMOGLOBIN QUAN: CPT

## 2022-07-30 PROCEDURE — 63600175 PHARM REV CODE 636 W HCPCS

## 2022-07-30 PROCEDURE — 84100 ASSAY OF PHOSPHORUS: CPT | Mod: 91 | Performed by: THORACIC SURGERY (CARDIOTHORACIC VASCULAR SURGERY)

## 2022-07-30 PROCEDURE — 99233 SBSQ HOSP IP/OBS HIGH 50: CPT | Mod: ,,, | Performed by: INTERNAL MEDICINE

## 2022-07-30 PROCEDURE — 82330 ASSAY OF CALCIUM: CPT

## 2022-07-30 PROCEDURE — 99233 PR SUBSEQUENT HOSPITAL CARE,LEVL III: ICD-10-PCS | Mod: ,,, | Performed by: ANESTHESIOLOGY

## 2022-07-30 PROCEDURE — 63600175 PHARM REV CODE 636 W HCPCS: Performed by: STUDENT IN AN ORGANIZED HEALTH CARE EDUCATION/TRAINING PROGRAM

## 2022-07-30 PROCEDURE — 25000003 PHARM REV CODE 250: Performed by: PHYSICIAN ASSISTANT

## 2022-07-30 PROCEDURE — 99900026 HC AIRWAY MAINTENANCE (STAT)

## 2022-07-30 PROCEDURE — 85520 HEPARIN ASSAY: CPT | Performed by: THORACIC SURGERY (CARDIOTHORACIC VASCULAR SURGERY)

## 2022-07-30 PROCEDURE — 83615 LACTATE (LD) (LDH) ENZYME: CPT | Performed by: STUDENT IN AN ORGANIZED HEALTH CARE EDUCATION/TRAINING PROGRAM

## 2022-07-30 PROCEDURE — 37799 UNLISTED PX VASCULAR SURGERY: CPT

## 2022-07-30 PROCEDURE — 82803 BLOOD GASES ANY COMBINATION: CPT

## 2022-07-30 PROCEDURE — 85730 THROMBOPLASTIN TIME PARTIAL: CPT | Performed by: THORACIC SURGERY (CARDIOTHORACIC VASCULAR SURGERY)

## 2022-07-30 PROCEDURE — 87205 SMEAR GRAM STAIN: CPT | Performed by: STUDENT IN AN ORGANIZED HEALTH CARE EDUCATION/TRAINING PROGRAM

## 2022-07-30 PROCEDURE — 25000003 PHARM REV CODE 250: Performed by: THORACIC SURGERY (CARDIOTHORACIC VASCULAR SURGERY)

## 2022-07-30 PROCEDURE — 85025 COMPLETE CBC W/AUTO DIFF WBC: CPT | Mod: 91 | Performed by: THORACIC SURGERY (CARDIOTHORACIC VASCULAR SURGERY)

## 2022-07-30 PROCEDURE — 99291 PR CRITICAL CARE, E/M 30-74 MINUTES: ICD-10-PCS | Mod: ,,, | Performed by: INTERNAL MEDICINE

## 2022-07-30 PROCEDURE — 94003 VENT MGMT INPAT SUBQ DAY: CPT

## 2022-07-30 PROCEDURE — 99232 SBSQ HOSP IP/OBS MODERATE 35: CPT | Mod: ,,, | Performed by: INTERNAL MEDICINE

## 2022-07-30 PROCEDURE — 87070 CULTURE OTHR SPECIMN AEROBIC: CPT | Performed by: STUDENT IN AN ORGANIZED HEALTH CARE EDUCATION/TRAINING PROGRAM

## 2022-07-30 PROCEDURE — 25000242 PHARM REV CODE 250 ALT 637 W/ HCPCS: Performed by: THORACIC SURGERY (CARDIOTHORACIC VASCULAR SURGERY)

## 2022-07-30 PROCEDURE — 99900025 HC BRONCHOSCOPY-ASST (STAT)

## 2022-07-30 PROCEDURE — 83605 ASSAY OF LACTIC ACID: CPT

## 2022-07-30 PROCEDURE — 85384 FIBRINOGEN ACTIVITY: CPT | Performed by: THORACIC SURGERY (CARDIOTHORACIC VASCULAR SURGERY)

## 2022-07-30 PROCEDURE — 94761 N-INVAS EAR/PLS OXIMETRY MLT: CPT

## 2022-07-30 PROCEDURE — 93750 INTERROGATION VAD IN PERSON: CPT | Mod: ,,, | Performed by: INTERNAL MEDICINE

## 2022-07-30 PROCEDURE — A4217 STERILE WATER/SALINE, 500 ML: HCPCS

## 2022-07-30 PROCEDURE — 84439 ASSAY OF FREE THYROXINE: CPT | Performed by: THORACIC SURGERY (CARDIOTHORACIC VASCULAR SURGERY)

## 2022-07-30 PROCEDURE — 99900035 HC TECH TIME PER 15 MIN (STAT)

## 2022-07-30 PROCEDURE — 80053 COMPREHEN METABOLIC PANEL: CPT | Mod: 91 | Performed by: THORACIC SURGERY (CARDIOTHORACIC VASCULAR SURGERY)

## 2022-07-30 PROCEDURE — 31622 DX BRONCHOSCOPE/WASH: CPT

## 2022-07-30 PROCEDURE — 85610 PROTHROMBIN TIME: CPT | Performed by: THORACIC SURGERY (CARDIOTHORACIC VASCULAR SURGERY)

## 2022-07-30 PROCEDURE — 27000221 HC OXYGEN, UP TO 24 HOURS

## 2022-07-30 PROCEDURE — 99291 CRITICAL CARE FIRST HOUR: CPT | Mod: ,,, | Performed by: INTERNAL MEDICINE

## 2022-07-30 PROCEDURE — 27200966 HC CLOSED SUCTION SYSTEM

## 2022-07-30 PROCEDURE — C9113 INJ PANTOPRAZOLE SODIUM, VIA: HCPCS

## 2022-07-30 PROCEDURE — 25000242 PHARM REV CODE 250 ALT 637 W/ HCPCS

## 2022-07-30 RX ORDER — POTASSIUM CHLORIDE 14.9 MG/ML
20 INJECTION INTRAVENOUS ONCE
Status: COMPLETED | OUTPATIENT
Start: 2022-07-30 | End: 2022-07-30

## 2022-07-30 RX ORDER — MIDAZOLAM HYDROCHLORIDE 1 MG/ML
2 INJECTION INTRAMUSCULAR; INTRAVENOUS ONCE
Status: COMPLETED | OUTPATIENT
Start: 2022-07-30 | End: 2022-07-30

## 2022-07-30 RX ORDER — MIDAZOLAM HYDROCHLORIDE 1 MG/ML
INJECTION INTRAMUSCULAR; INTRAVENOUS
Status: COMPLETED
Start: 2022-07-30 | End: 2022-07-30

## 2022-07-30 RX ORDER — INSULIN ASPART 100 [IU]/ML
0-5 INJECTION, SOLUTION INTRAVENOUS; SUBCUTANEOUS EVERY 4 HOURS PRN
Status: DISCONTINUED | OUTPATIENT
Start: 2022-07-30 | End: 2022-08-22

## 2022-07-30 RX ORDER — LIDOCAINE HYDROCHLORIDE 20 MG/ML
100 INJECTION INTRAVENOUS ONCE
Status: COMPLETED | OUTPATIENT
Start: 2022-07-30 | End: 2022-07-30

## 2022-07-30 RX ORDER — POTASSIUM CHLORIDE 29.8 MG/ML
40 INJECTION INTRAVENOUS EVERY 4 HOURS
Status: COMPLETED | OUTPATIENT
Start: 2022-07-30 | End: 2022-07-31

## 2022-07-30 RX ORDER — MAGNESIUM SULFATE 1 G/100ML
1 INJECTION INTRAVENOUS ONCE
Status: COMPLETED | OUTPATIENT
Start: 2022-07-30 | End: 2022-07-30

## 2022-07-30 RX ADMIN — HEPARIN SODIUM 1600 UNITS/HR: 5000 INJECTION INTRAVENOUS; SUBCUTANEOUS at 10:07

## 2022-07-30 RX ADMIN — CHOLESTYRAMINE 4 G: 4 POWDER, FOR SUSPENSION ORAL at 08:07

## 2022-07-30 RX ADMIN — LEVALBUTEROL HYDROCHLORIDE 0.63 MG: 0.63 SOLUTION RESPIRATORY (INHALATION) at 07:07

## 2022-07-30 RX ADMIN — LIDOCAINE HYDROCHLORIDE 100 MG: 20 INJECTION INTRAVENOUS at 01:07

## 2022-07-30 RX ADMIN — POTASSIUM CHLORIDE 20 MEQ: 14.9 INJECTION, SOLUTION INTRAVENOUS at 12:07

## 2022-07-30 RX ADMIN — VASOPRESSIN 0.04 UNITS/MIN: 20 INJECTION INTRAVENOUS at 02:07

## 2022-07-30 RX ADMIN — HYDROMORPHONE HYDROCHLORIDE 1 MG: 1 INJECTION, SOLUTION INTRAMUSCULAR; INTRAVENOUS; SUBCUTANEOUS at 11:07

## 2022-07-30 RX ADMIN — POTASSIUM CHLORIDE 40 MEQ: 29.8 INJECTION, SOLUTION INTRAVENOUS at 09:07

## 2022-07-30 RX ADMIN — MIDAZOLAM HYDROCHLORIDE 2 MG: 1 INJECTION INTRAMUSCULAR; INTRAVENOUS at 04:07

## 2022-07-30 RX ADMIN — ASCORBIC ACID, VITAMIN A PALMITATE, CHOLECALCIFEROL, THIAMINE HYDROCHLORIDE, RIBOFLAVIN-5 PHOSPHATE SODIUM, PYRIDOXINE HYDROCHLORIDE, NIACINAMIDE, DEXPANTHENOL, ALPHA-TOCOPHEROL ACETATE, VITAMIN K1, FOLIC ACID, BIOTIN, CYANOCOBALAMIN: 200; 3300; 200; 6; 3.6; 6; 40; 15; 10; 150; 600; 60; 5 INJECTION, SOLUTION INTRAVENOUS at 09:07

## 2022-07-30 RX ADMIN — MIDODRINE HYDROCHLORIDE 15 MG: 5 TABLET ORAL at 05:07

## 2022-07-30 RX ADMIN — MAGNESIUM SULFATE HEPTAHYDRATE 1 G: 500 INJECTION, SOLUTION INTRAMUSCULAR; INTRAVENOUS at 09:07

## 2022-07-30 RX ADMIN — INSULIN ASPART 2 UNITS: 100 INJECTION, SOLUTION INTRAVENOUS; SUBCUTANEOUS at 08:07

## 2022-07-30 RX ADMIN — CHOLESTYRAMINE 4 G: 4 POWDER, FOR SUSPENSION ORAL at 12:07

## 2022-07-30 RX ADMIN — PREDNISONE 40 MG: 20 TABLET ORAL at 08:07

## 2022-07-30 RX ADMIN — MIDODRINE HYDROCHLORIDE 15 MG: 5 TABLET ORAL at 01:07

## 2022-07-30 RX ADMIN — AMIODARONE HYDROCHLORIDE 0.25 MG/MIN: 1.8 INJECTION, SOLUTION INTRAVENOUS at 04:07

## 2022-07-30 RX ADMIN — ACETAMINOPHEN 999.01 MG: 160 SOLUTION ORAL at 01:07

## 2022-07-30 RX ADMIN — PANTOPRAZOLE SODIUM 40 MG: 40 INJECTION, POWDER, FOR SOLUTION INTRAVENOUS at 08:07

## 2022-07-30 RX ADMIN — METRONIDAZOLE 500 MG: 500 TABLET ORAL at 05:07

## 2022-07-30 RX ADMIN — LEVALBUTEROL HYDROCHLORIDE 0.63 MG: 0.63 SOLUTION RESPIRATORY (INHALATION) at 01:07

## 2022-07-30 RX ADMIN — AMIODARONE HYDROCHLORIDE 400 MG: 200 TABLET ORAL at 08:07

## 2022-07-30 RX ADMIN — HEPARIN SODIUM 2000 UNITS/HR: 5000 INJECTION INTRAVENOUS; SUBCUTANEOUS at 03:07

## 2022-07-30 RX ADMIN — AMIODARONE HYDROCHLORIDE 150 MG: 1.5 INJECTION, SOLUTION INTRAVENOUS at 03:07

## 2022-07-30 RX ADMIN — POTASSIUM CHLORIDE 20 MEQ: 14.9 INJECTION, SOLUTION INTRAVENOUS at 05:07

## 2022-07-30 RX ADMIN — AMIODARONE HYDROCHLORIDE 1 MG/MIN: 1.8 INJECTION, SOLUTION INTRAVENOUS at 08:07

## 2022-07-30 RX ADMIN — MEXILETINE HYDROCHLORIDE 400 MG: 200 CAPSULE ORAL at 09:07

## 2022-07-30 RX ADMIN — POLYETHYLENE GLYCOL 3350 17 G: 17 POWDER, FOR SOLUTION ORAL at 08:07

## 2022-07-30 RX ADMIN — MIDAZOLAM 2 MG: 1 INJECTION INTRAMUSCULAR; INTRAVENOUS at 04:07

## 2022-07-30 RX ADMIN — ACETYLCYSTEINE 4 ML: 100 SOLUTION ORAL; RESPIRATORY (INHALATION) at 01:07

## 2022-07-30 RX ADMIN — GUAIFENESIN 300 MG: 100 SOLUTION ORAL at 05:07

## 2022-07-30 RX ADMIN — AMIODARONE HYDROCHLORIDE 400 MG: 200 TABLET ORAL at 02:07

## 2022-07-30 RX ADMIN — MEXILETINE HYDROCHLORIDE 400 MG: 200 CAPSULE ORAL at 05:07

## 2022-07-30 RX ADMIN — ACETYLCYSTEINE 4 ML: 100 SOLUTION ORAL; RESPIRATORY (INHALATION) at 07:07

## 2022-07-30 RX ADMIN — AMIODARONE HYDROCHLORIDE 150 MG: 1.5 INJECTION, SOLUTION INTRAVENOUS at 01:07

## 2022-07-30 RX ADMIN — DEXMEDETOMIDINE HYDROCHLORIDE 0.3 MCG/KG/HR: 4 INJECTION INTRAVENOUS at 12:07

## 2022-07-30 RX ADMIN — METRONIDAZOLE 500 MG: 500 TABLET ORAL at 09:07

## 2022-07-30 RX ADMIN — PROPOFOL 20 MCG/KG/MIN: 10 INJECTION, EMULSION INTRAVENOUS at 07:07

## 2022-07-30 RX ADMIN — Medication 0.04 MCG/KG/MIN: at 08:07

## 2022-07-30 RX ADMIN — LIDOCAINE HYDROCHLORIDE 1 MG/MIN: 8 INJECTION, SOLUTION INTRAVENOUS at 01:07

## 2022-07-30 RX ADMIN — GUAIFENESIN 300 MG: 100 SOLUTION ORAL at 06:07

## 2022-07-30 RX ADMIN — POTASSIUM CHLORIDE 20 MEQ: 200 INJECTION, SOLUTION INTRAVENOUS at 12:07

## 2022-07-30 RX ADMIN — METHIMAZOLE 20 MG: 10 TABLET ORAL at 08:07

## 2022-07-30 RX ADMIN — AMIODARONE HYDROCHLORIDE 150 MG: 1.5 INJECTION, SOLUTION INTRAVENOUS at 11:07

## 2022-07-30 RX ADMIN — MEXILETINE HYDROCHLORIDE 400 MG: 200 CAPSULE ORAL at 02:07

## 2022-07-30 RX ADMIN — CHOLESTYRAMINE 4 G: 4 POWDER, FOR SUSPENSION ORAL at 04:07

## 2022-07-30 RX ADMIN — AMIODARONE HYDROCHLORIDE 1 MG/MIN: 1.8 INJECTION, SOLUTION INTRAVENOUS at 03:07

## 2022-07-30 RX ADMIN — GUAIFENESIN 300 MG: 100 SOLUTION ORAL at 12:07

## 2022-07-30 RX ADMIN — METRONIDAZOLE 500 MG: 500 TABLET ORAL at 01:07

## 2022-07-30 RX ADMIN — CEFEPIME 2 G: 2 INJECTION, POWDER, FOR SOLUTION INTRAVENOUS at 07:07

## 2022-07-30 RX ADMIN — MIDODRINE HYDROCHLORIDE 15 MG: 5 TABLET ORAL at 09:07

## 2022-07-30 RX ADMIN — CEFEPIME 2 G: 2 INJECTION, POWDER, FOR SOLUTION INTRAVENOUS at 08:07

## 2022-07-30 RX ADMIN — ASPIRIN 81 MG CHEWABLE TABLET 81 MG: 81 TABLET CHEWABLE at 08:07

## 2022-07-30 NOTE — SUBJECTIVE & OBJECTIVE
Interval History/Significant Events: CT scan yesterday showing atelectasis in both lungs. Bronch performed without evidence of large airway obstruction or mucus. Increasing pressor requirements overnight that were able to be weaned. Dyssynchrony with ventilator this morning which improved after respiratory treatments.     Follow-up For: Procedure(s) (LRB):  REMOVAL, FOREIGN BODY (N/A)    Post-Operative Day: 8 Days Post-Op    Objective:     Vital Signs (Most Recent):  Temp: (!) 100.58 °F (38.1 °C) (07/30/22 0939)  Pulse: 80 (07/30/22 0939)  Resp: (!) 25 (07/30/22 0939)  BP: (!) 70/0 (07/30/22 0800)  SpO2: 95 % (07/30/22 0700)   Vital Signs (24h Range):  Temp:  [99.68 °F (37.6 °C)-101.12 °F (38.4 °C)] 100.58 °F (38.1 °C)  Pulse:  [] 80  Resp:  [22-33] 25  SpO2:  [95 %-99 %] 95 %  BP: (48-82)/(0) 70/0  Arterial Line BP: (66-87)/(55-79) 75/65     Weight: 100.7 kg (222 lb)  Body mass index is 29.29 kg/m².      Intake/Output Summary (Last 24 hours) at 7/30/2022 1003  Last data filed at 7/30/2022 0900  Gross per 24 hour   Intake 2893.01 ml   Output 2740 ml   Net 153.01 ml       Physical Exam  Constitutional:       Comments: Intubated and sedated   HENT:      Head: Normocephalic and atraumatic.      Nose:      Comments: NG in place  Eyes:      Pupils: Pupils are equal, round, and reactive to light.   Neck:      Comments: R IJ CVC    Cardiovascular:      Rate and Rhythm: Normal rate. Rhythm irregular.      Comments: LVAD in place -- 6400 rpm, 5.0L    Midline sternotomy c/d with dressing in place      Pulmonary:      Comments: Intubated      Abdominal:      General: There is no distension.      Palpations: Abdomen is soft.      Comments: Prior driveline site packed with iodoform gauze   Genitourinary:     Comments: Lagunas in place draining clear urine  Musculoskeletal:      Comments: ECMO cannula sites with dressing in place.     Cutdown incision with seroma with wound vac in place, serous output. Changed  yesterday    right radial arterial line   Skin:     General: Skin is warm and dry.   Neurological:      Comments: Sedated       Vents:  Vent Mode: A/C (07/30/22 0939)  Ventilator Initiated: Yes (07/29/22 0002)  Set Rate: 24 BPM (07/30/22 0939)  Vt Set: 450 mL (07/30/22 0939)  Pressure Support: 8 cmH20 (07/29/22 0002)  PEEP/CPAP: 10 cmH20 (07/30/22 0939)  Oxygen Concentration (%): 60 (07/30/22 0939)  Peak Airway Pressure: 36 cmH2O (07/30/22 0939)  Plateau Pressure: 27 cmH20 (07/30/22 0939)  Total Ve: 11 mL (07/30/22 0939)  Negative Inspiratory Force (cm H2O): 0 (07/30/22 0939)  F/VT Ratio<105 (RSBI): (!) 61.27 (07/30/22 0939)    Lines/Drains/Airways       Central Venous Catheter Line  Duration             Percutaneous Central Line Insertion/Assessment - Quad Lumen  07/21/22 1515 right internal jugular 8 days              Drain  Duration                  NG/OG Tube 07/15/22 1030 Left nostril 14 days         Urethral Catheter 07/27/22 1536 Temperature probe 16 Fr. 2 days              Airway  Duration                  Airway - Non-Surgical 07/29/22 0042 1 day              Arterial Line  Duration             Arterial Line 07/13/22 2000 Right Radial 16 days              Line  Duration                  VAD 07/13/22 1300 Left ventricular assist device HeartMate 3 16 days              Peripheral Intravenous Line  Duration                  Midline Catheter Insertion/Assessment  - Single Lumen 07/21/22 1616 Left basilic vein (medial side of arm) 18g x 10cm 8 days         Peripheral IV - Single Lumen 07/30/22 0648 22 G Posterior;Right Hand <1 day                    Significant Labs:    CBC/Anemia Profile:  Recent Labs   Lab 07/29/22  1233 07/29/22  1934 07/30/22  0415   WBC 19.35* 18.31* 14.92*   HGB 7.3* 7.4* 8.1*   HCT 23.8* 24.4* 26.7*   * 472* 461*   MCV 95 97 93   RDW 21.8* 22.1* 22.2*        Chemistries:  Recent Labs   Lab 07/29/22  1233 07/29/22  1934 07/30/22  0415    140 139   K 4.2 3.7 4.2    105  105   CO2 21* 22* 19*   BUN 72* 77* 86*   CREATININE 2.8* 2.7* 2.8*   CALCIUM 8.6* 9.2 9.2   ALBUMIN 2.1* 2.0* 2.1*   PROT 6.0 6.5 7.0   BILITOT 6.7* 6.5* 7.1*   ALKPHOS 113 102 112   ALT 40 38 38   AST 46* 53* 44*   MG 2.3 2.2 2.8*   PHOS 5.1* 4.1 3.5       ABGs:   Recent Labs   Lab 07/30/22  0415   PH 7.355   PCO2 44.2   HCO3 24.7   POCSATURATED 95   BE -1     Coagulation:   Recent Labs   Lab 07/28/22  1132 07/28/22  1945 07/30/22 0415   INR 1.6*   < > 1.6*   APTT 53.7*  --   --     < > = values in this interval not displayed.     All pertinent labs within the past 24 hours have been reviewed.    Significant Imaging:  I have reviewed all pertinent imaging results/findings within the past 24 hours.

## 2022-07-30 NOTE — ASSESSMENT & PLAN NOTE
57 yo male with PMH of HFrEF with an EF of 10% s/p HM2 admitted on 6/27 with acute respiratory failure secondary to COVID complicated with LVAD pump thrombosis s/p LVAD pump exchange with HM3 on 7/13. Post-op course complicated with worsening cardiogenic shock/RV failure requiring VA-ECMO On 7/30, EP re consulted for concerns of wide complex tachyarrhythmia. Patient remains intubated, sedated and on low dose levo/epi. Regarding antiarrhythmics he is currently on amiodarone gtt at 0.25, amiodarone 400 mg PO TID, mexiletine 400 mg q8hr. Upon tele review, appears to be in 2:1 atrial flutter with rate of 150. MAPs remain unchanged but nurse did note lower PI reads on VAD.     Recommendations   - appears to be in 2:1 flutter on tele, hemodynamics appear unchanged  - would increase amiodarone to 1mg/min  - if patient becomes unstable, recommend bedside cardioversion (already sedated on propofol)   - will likely be difficult to keep in sinus rhythm if he converts given his critical illness   - minimize pressors as tolerated   - keep mag>2, K>4

## 2022-07-30 NOTE — PROGRESS NOTES
[facility] - Surgical Intensive Care  Critical Care - Surgery  Progress Note    Patient Name: Tmi Richards  MRN: [id number]  Admission Date: 6/27/2022  Hospital Length of Stay: 33 days  Code Status: Full Code  Attending Provider: Yg Kaufman MD  Primary Care Provider: Deyanira Booth MD   Principal Problem: Left ventricular assist device (LVAD) complication    Subjective:     Hospital/ICU Course:  No notes on file    Interval History/Significant Events: CT scan yesterday showing atelectasis in both lungs. Bronch performed without evidence of large airway obstruction or mucus. Increasing pressor requirements overnight that were able to be weaned. Dyssynchrony with ventilator this morning which improved after respiratory treatments.     Follow-up For: Procedure(s) (LRB):  REMOVAL, FOREIGN BODY (N/A)    Post-Operative Day: 8 Days Post-Op    Objective:     Vital Signs (Most Recent):  Temp: (!) 100.58 °F (38.1 °C) (07/30/22 0939)  Pulse: 80 (07/30/22 0939)  Resp: (!) 25 (07/30/22 0939)  BP: (!) 70/0 (07/30/22 0800)  SpO2: 95 % (07/30/22 0700)   Vital Signs (24h Range):  Temp:  [99.68 °F (37.6 °C)-101.12 °F (38.4 °C)] 100.58 °F (38.1 °C)  Pulse:  [] 80  Resp:  [22-33] 25  SpO2:  [95 %-99 %] 95 %  BP: (48-82)/(0) 70/0  Arterial Line BP: (66-87)/(55-79) 75/65     Weight: 100.7 kg (222 lb)  Body mass index is 29.29 kg/m².      Intake/Output Summary (Last 24 hours) at 7/30/2022 1003  Last data filed at 7/30/2022 0900  Gross per 24 hour   Intake 2893.01 ml   Output 2740 ml   Net 153.01 ml       Physical Exam  Constitutional:       Comments: Intubated and sedated   HENT:      Head: Normocephalic and atraumatic.      Nose:      Comments: NG in place  Eyes:      Pupils: Pupils are equal, round, and reactive to light.   Neck:      Comments: R IJ CVC    Cardiovascular:      Rate and Rhythm: Normal rate. Rhythm irregular.      Comments: LVAD in place -- 6400 rpm, 5.0L    Midline sternotomy c/d with dressing in  place      Pulmonary:      Comments: Intubated      Abdominal:      General: There is no distension.      Palpations: Abdomen is soft.      Comments: Prior driveline site packed with iodoform gauze   Genitourinary:     Comments: Lagunas in place draining clear urine  Musculoskeletal:      Comments: ECMO cannula sites with dressing in place.     Cutdown incision with seroma with wound vac in place, serous output. Changed yesterday    right radial arterial line   Skin:     General: Skin is warm and dry.   Neurological:      Comments: Sedated       Vents:  Vent Mode: A/C (07/30/22 0939)  Ventilator Initiated: Yes (07/29/22 0002)  Set Rate: 24 BPM (07/30/22 0939)  Vt Set: 450 mL (07/30/22 0939)  Pressure Support: 8 cmH20 (07/29/22 0002)  PEEP/CPAP: 10 cmH20 (07/30/22 0939)  Oxygen Concentration (%): 60 (07/30/22 0939)  Peak Airway Pressure: 36 cmH2O (07/30/22 0939)  Plateau Pressure: 27 cmH20 (07/30/22 0939)  Total Ve: 11 mL (07/30/22 0939)  Negative Inspiratory Force (cm H2O): 0 (07/30/22 0939)  F/VT Ratio<105 (RSBI): (!) 61.27 (07/30/22 0939)    Lines/Drains/Airways       Central Venous Catheter Line  Duration             Percutaneous Central Line Insertion/Assessment - Quad Lumen  07/21/22 1515 right internal jugular 8 days              Drain  Duration                  NG/OG Tube 07/15/22 1030 Left nostril 14 days         Urethral Catheter 07/27/22 1536 Temperature probe 16 Fr. 2 days              Airway  Duration                  Airway - Non-Surgical 07/29/22 0042 1 day              Arterial Line  Duration             Arterial Line 07/13/22 2000 Right Radial 16 days              Line  Duration                  VAD 07/13/22 1300 Left ventricular assist device HeartMate 3 16 days              Peripheral Intravenous Line  Duration                  Midline Catheter Insertion/Assessment  - Single Lumen 07/21/22 1616 Left basilic vein (medial side of arm) 18g x 10cm 8 days         Peripheral IV - Single Lumen 07/30/22 0648  22 G Posterior;Right Hand <1 day                    Significant Labs:    CBC/Anemia Profile:  Recent Labs   Lab 07/29/22  1233 07/29/22  1934 07/30/22  0415   WBC 19.35* 18.31* 14.92*   HGB 7.3* 7.4* 8.1*   HCT 23.8* 24.4* 26.7*   * 472* 461*   MCV 95 97 93   RDW 21.8* 22.1* 22.2*        Chemistries:  Recent Labs   Lab 07/29/22  1233 07/29/22 1934 07/30/22  0415    140 139   K 4.2 3.7 4.2    105 105   CO2 21* 22* 19*   BUN 72* 77* 86*   CREATININE 2.8* 2.7* 2.8*   CALCIUM 8.6* 9.2 9.2   ALBUMIN 2.1* 2.0* 2.1*   PROT 6.0 6.5 7.0   BILITOT 6.7* 6.5* 7.1*   ALKPHOS 113 102 112   ALT 40 38 38   AST 46* 53* 44*   MG 2.3 2.2 2.8*   PHOS 5.1* 4.1 3.5       ABGs:   Recent Labs   Lab 07/30/22  0415   PH 7.355   PCO2 44.2   HCO3 24.7   POCSATURATED 95   BE -1     Coagulation:   Recent Labs   Lab 07/28/22  1132 07/28/22 1945 07/30/22  0415   INR 1.6*   < > 1.6*   APTT 53.7*  --   --     < > = values in this interval not displayed.     All pertinent labs within the past 24 hours have been reviewed.    Significant Imaging:  I have reviewed all pertinent imaging results/findings within the past 24 hours.    Assessment/Plan:     Chronic combined systolic and diastolic heart failure  Tim Richards is a 56 y.o. male HFrEF with an EF of 10% and a history of HM2 LVAD in 2018 who is now s/p HM3 upgrade, initiation of V-A ECMO after failure to wean off bypass x2      Neuro/Psych:   -- Sedation: precedex, propofol   -- Pain: PRN Dilaudid             Cards:   -- S/P LVAD exchange on 7/14/22  --Improving pressor requirements, wean gwen for MAP > 75, keep other pressors the same   Epi 0.04   Levo off   Vaso 0.04   Giapreza off  -- Continue Midodrine 15 mg Q8  -- Stress dose steroids complete  -- MAP goal 75  -- VAD  flows stable  -- Decannulated on 7/19  -- Sternal closure on 7/15  -- Bedside drive line removal on 7/23  -- EP consult due to v-tach  -- Continue lidocaine, continue amio ggt, restarted mexiletine       Pulm:   -- Goal O2 sat > 90%  -- ABG PRN  -- Chest tubes removed  -- Nitric weaned at 20  -- Extubated 7/26, re intubated 7/28  -- Monitor O2 sat with ABGs  -- Bronch yesterday without significant findings, repeat today      Renal:  -- Keep sun for strict I/O  -- Trend BUN/Cr  -- Lasix gtt 2.5/hr        FEN / GI:   -- Replace lytes as needed  -- Nutrition: NPO,TPN  -- GI ppx: PPI  -- Bowel reg: miralax, docusate, mag citrate      ID:   -- Tm: febrile; WBC increasing   -- ABX cefepime, falgyl  -- Cultures sent, repeat cultures sent 7/22, no growth to date, repeat cultures sent 7/26 --> gram negative rods on sputum cultures  -- BAL GNR --> pansensitive klebsiella   -- Repeat sputum cultures --> pansensitive klebsiella  -- ID consult   -- daily vanc levels  -- Line exchange 7/21  -- UA with many bacteria and WBCs, culture pending  -- consider CT scan due to ongoing intermittent fevers and increasing WBC      Heme/Onc:   -- H/H stable   -- Daily CBC  -- Received 18 pRBCs, 16 FFP, 2 plt, 25 cryo; 1500 albumin intra-op  -- Heparin gtt at 1600, do not go above        Endo:   -- BG goal 140-180  -- SSI  -- Levothyroxine discontinued  -- hyperthyroid despite being hypothyroid at home  -- Endocrine consulted  -- Started on prednisone and methimazole       PPx:   Feeding: NPO, TPN  Analgesia/Sedation: Precedex, propofol / PRN Dilaudid  Thromboembolic prevention: SCDs, heparin gtt  HOB >30: Yes  Stress Ulcer ppx: PPI  Glucose control: Critical care goal 140-180 g/dl, ISS     Lines/Drains/Airway: NG; RIJ CVC, r-radial a-line, sun; W[place], VAD; midline      Dispo/Code Status/Palliative:   -- SICU / Full Code.              Dang Amezcua MD  Critical Care - Surgery  [facility] - Surgical Intensive Care

## 2022-07-30 NOTE — PROGRESS NOTES
Dr. Kaufman informed pt , PI 1.3. MAP ~70. Orders received for lidocaine 100mg IVP x1, lido gtt @ 1mg/min, amio bolus.

## 2022-07-30 NOTE — PROGRESS NOTES
Dr. Kaufman informed PI still 1.3 despite speed change. HR still 160s. States to cardiovert pt. Dr. Shrestha called to bedside for cardioversion. Pt sedated with versed 2mg IVP and on a precedex gtt.

## 2022-07-30 NOTE — PROCEDURES
DATE OF PROCEDURE: 7/22/2022     PREOPERATIVE DIAGNOSES:   Left ventricular assist device (LVAD) complication [T82.9XXA]    POSTOPERATIVE DIAGNOSES:   Left ventricular assist device (LVAD) complication [T82.9XXA]    PROCEDURES PERFORMED:   Bronchoscopy    Informed consent:  The nature of the surgery and possible benefits explained to and appreciated by patient. The multiple complications and adverse outcomes including:surgical infection, pneumonia, air leak / fluid drainage from the lung requiring prolonged chest tube drainage, intra-thoracic abscess, bleeding, lung tumor or infection/ bleeding recurrence, cardiac arrhythmias, myocardial infarction, renal failure, prolonged gastrointestinal infection or dysfunction, respiratory failure, need for tracheostomy, pulmonary embolus, thrombophlebitis, multiple organ failure, stroke, and prolonged hospital / extended care facility stay, acute and chronic pain, the need for new chronic medical care, along with other unsuspected problems,also understood and appreciated by patient who voluntarily and in an informed state of mind agreed to this surgical intervention.    Operation Detail:  Patient with worsening PaO2 and recent intubation.     Fiberoptic bronchoscopy was the performed introducing the bronchoscopy through the endotracheal tube. Bronchoscopy revealed minimal mucous predominantly in the right lower lobe. A BAL was performed and cultures sent. The remainder of the airways were normal with no mucosal changes    INTRAOPERATIVE COMPLICATIONS: none    ESTIMATED BLOOD LOSS: 0 mL      Specimens:   Bronchioalveolar lavage right lower lobe       Vidhi Nicolas MD, PGY-7  Cardiothoracic Surgery   [phone]

## 2022-07-30 NOTE — ASSESSMENT & PLAN NOTE
Procedure: Device Interrogation Including analysis of device parameters  Current Settings: Ventricular Assist Device  Review of device function is stable    TXP LVAD INTERROGATIONS 7/31/2022 7/31/2022 7/31/2022 7/31/2022 7/31/2022 7/31/2022 7/31/2022   Type HeartMate3 HeartMate3 HeartMate3 HeartMate3 HeartMate3 HeartMate3 HeartMate3   Flow 5.3 5.4 5.3 5.5 5.3 5.3 5.3   Speed 6200 6200 6200 6200 6200 6200 6200   PI 3.4 3.4 3.2 3.3 3.3 3.3 3.1   Power ([place]) 5.2 5.1 5.2 5.1 5.1 5.2 5.1   LSL - - 5800 - - - 5800   Low Flow Alarm - - - - - - -   Pulsatility - - Intermittent pulse - - - Intermittent pulse

## 2022-07-30 NOTE — PROCEDURES
"Tim Richards is a 56 y.o. male patient.    Temp: (!) 101.3 °F (38.5 °C) (07/30/22 1315)  Pulse: (!) 160 (07/30/22 1334)  Resp: (!) 24 (07/30/22 1334)  BP: (!) 70/0 (07/30/22 0800)  SpO2: 95 % (07/30/22 0700)  Weight: 100.7 kg (222 lb) (07/30/22 0420)  Height: 6' 1" (185.4 cm) (07/30/22 0746)       Arterial Line    Date/Time: 7/30/2022 1:35 PM  Location procedure was performed: [facility] CRITICAL CARE SURGERY  Performed by: Carlos Shrestha MD  Authorized by: Carlos Shrestha MD   Pre-op Diagnosis: Respiratory failure  Post-operative diagnosis: Same  Consent Done: Yes  Consent: Written consent obtained.  Consent given by: spouse  Time out: Immediately prior to procedure a "time out" was called to verify the correct patient, procedure, equipment, support staff and site/side marked as required.  Preparation: Patient was prepped and draped in the usual sterile fashion.  Indications: multiple ABGs and hemodynamic monitoring  Location: right radial  Needle gauge: 20  Post-procedure: line sutured and dressing applied  Comments: Wire exchange of current right radial arterial line due to malfunction. MiniStick kit and wire used. New catheter functions well with good waveform.          7/30/2022  "

## 2022-07-30 NOTE — PROGRESS NOTES
Dr. Kaufman informed HR 150s. MAP ~70, PI on VAD decreased to 1.4 from 3.2. order received for amio bolus, increase amio gtt to 1mg/min, consl EP.

## 2022-07-30 NOTE — PROGRESS NOTES
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 Underwent LVAD pump exchange. Post-op course complicated by worsening cardiogenic shock/RV failure requiring VA-ECMO. Improved clinically underwent successful decannulation. Continued episodes of VT with ICD shock 7/21 and ongoing anti-arrhythmic mediation adjustments. TFTs on 7/27 significant for undetectable TSH and elevated fT4.     Endocrinology was consulted for hyperthyroidism.       With regards to amiodarone induced hypothyroidism:  Last seen outpatient by Dr Piedra on 3/29/2022.    He initially developed amiodarone-induced hyperthyroidism (Type 2 AIT) in 7/2019. He required a prolonged course of PDN and MMI, then subsequently developed hypothyroidism in 5/2020. LT4 was adjusted based on serial TFT measurements. Most recently levothyroxine dose has been 112 mcg.     He self-decreased his amiodarone dose to 200 mg daily about 6 months ago. T4 was high on 7/13, but he was continued on levothyroxine 112 mcg.      Interval HPI:   Overnight events: Added cholestyramine for hyperthyroidism yesterday. Increasing pressor requirement still. fT4 still up-trending with normal T3. TF started and hyperglycemia noted. Although infrequent accuchecks currently.  Eating:   NPO  Nausea: No  Hypoglycemia and intervention: No  Fever: Yes  TPN and/or TF: Yes  If yes, type of TF/TPN and rate: Peptamen 1.5, ordered for rate 30mL/h, current rate is 42mL/hr    BP (!) 70/0   Pulse 79   Temp (!) 100.76 °F (38.2 °C)   Resp (!) 25   Ht 6' 1" (1.854 m)   Wt 100.7 kg (222 lb)   SpO2 95%   BMI 29.29 kg/m²     Labs Reviewed and Include    Recent Labs   Lab " 07/30/22  0415   *   CALCIUM 9.2   ALBUMIN 2.1*   PROT 7.0      K 4.2   CO2 19*      BUN 86*   CREATININE 2.8*   ALKPHOS 112   ALT 38   AST 44*   BILITOT 7.1*     Lab Results   Component Value Date    WBC 14.92 (H) 07/30/2022    HGB 8.1 (L) 07/30/2022    HCT 26.7 (L) 07/30/2022    MCV 93 07/30/2022     (H) 07/30/2022     Recent Labs   Lab 07/27/22  0348 07/29/22  0218 07/30/22 0415   TSH <0.010*  --   --    FREET4 2.45* 2.71* 3.25*     Lab Results   Component Value Date    HGBA1C 5.4 04/26/2021       Nutritional status:   Body mass index is 29.29 kg/m².  Lab Results   Component Value Date    ALBUMIN 2.1 (L) 07/30/2022    ALBUMIN 2.0 (L) 07/29/2022    ALBUMIN 2.1 (L) 07/29/2022     Lab Results   Component Value Date    PREALBUMIN 13 (L) 07/29/2022    PREALBUMIN 11 (L) 07/17/2022    PREALBUMIN 10 (L) 07/15/2022       Estimated Creatinine Clearance: 37.2 mL/min (A) (based on SCr of 2.8 mg/dL (H)).    Accu-Checks  No results for input(s): POCTGLUCOSE in the last 72 hours.    Current Medications and/or Treatments Impacting Glycemic Control  Immunotherapy:    Immunosuppressants       None          Steroids:   Hormones (From admission, onward)                Start     Stop Route Frequency Ordered    07/28/22 1545  predniSONE tablet 40 mg         -- PER NG TUBE Daily 07/28/22 1542    07/15/22 0900  vasopressin (PITRESSIN) 1 Units/mL in dextrose 5 % 100 mL infusion         -- IV Continuous 07/15/22 0900          Pressors:    Autonomic Drugs (From admission, onward)                Start     Stop Route Frequency Ordered    07/29/22 0011  EPINEPHrine (ADRENALIN) 1 mg/mL injection        Note to Pharmacy: Created by cabinet override    07/29 1214   07/29/22 0011    07/29/22 0007  EPINEPHrine 5 mg in dextrose 5% 250 mL infusion (premix)        Question Answer Comment   Begin at (in mcg/kg/min): 0.01    Titrate by: (in mcg/kg/min) 0.01    Titrate interval: (in minutes) 10    Titrate to maintain: (SBP or  MAP or Cardiac Index) MAP    Greater than: (in mmHg) 65    Maximum dose: (in mcg/kg/min) 2        -- IV Continuous 07/29/22 0008    07/23/22 1400  midodrine tablet 15 mg         -- PER NG TUBE Every 8 hours 07/23/22 0944    07/15/22 0900  NORepinephrine 8 mg in dextrose 5% 250 mL infusion        Question Answer Comment   Begin at (in mcg/kg/min): 0.1    Titrate by: (in mcg/kg/min) 0.02    Titrate interval: (in minutes) 20    Titrate to maintain: (MAP or SBP) MAP    Greater than: (in mmHg) 65    Maximum dose: (in mcg/kg/min) 0.2        -- IV Continuous 07/15/22 0900          Hyperglycemia/Diabetes Medications:   Antihyperglycemics (From admission, onward)                Start     Stop Route Frequency Ordered    07/29/22 1225  insulin aspart U-100 pen 1-10 Units         -- SubQ Every 4 hours PRN 07/29/22 1129            ASSESSMENT and PLAN    Amiodarone-induced hyperthyroidism  -Pt with hx of Amiodarone induced hyperthyroidism type 2, then developed Hypothyroidism.  - TRAb and TSI - negative. US unremarkable with low vascularity.   - After previous presentation of AIT, he was weaned off methimazole and prednisone by 5/2020  - Then developed hypothyroidism, LT4 started and dose titrated per TFTs    - Prior to current admission he was on LT4 112 mcg qd  - Labs c/w hypothyroidism until current admission, initially on 7/13, with low TSH and elevated fT4. Repeat TFTs on 7/27 with worsening hyperthyroidism. He continued to receive LT4 112 mcg through 7/28.  - He remains on amiodarone for episodes of VT  - Suspect current hyperthyroidism is AIT, however continued exogenous LT4 exacerbated/contributed to current presentation     Lab Results   Component Value Date    TSH <0.010 (L) 07/27/2022    S1JBSJQ 70 07/30/2022    I2PIRPP 9.5 11/11/2021    FREET4 3.25 (H) 07/30/2022       Recommendations:  - Although mechanism unknown, strongly suspect AIT (type 1 or 2) -- will start treatment for AIT type 2 empirically due to clinical  implications in delayed treatment. Ultimately steroids and methimazole would be weaned based on response   - Methimazole 20mg BID and Prednisone 40mg qd  - Added Cholestyramine 4g QID  - Daily fT4 and T3  - We discontinued levothyroxine    amiodarone induced hypothyrodism  After previous AIT he becme hypothyroid and required thyroid replacement   Levothyroxine discontinued on 7/28/2022    VT (ventricular tachycardia)  Recurrent VT requiring multiple antiarrhythmics, including continued, long-term need for amiodarone       Hyperglycemia  Hyperglycemia noted once starting TF, no hx of DM  Begin Accuchecks q4h with low dose correction for continuous enteral feeding  Will add scheduled aspart if requiring correction regularly    - Will watch trend and adjust insulin as needed  - Please notify Endocrine if the tube feeds are stopped or restarted for any reason as insulin requirements will change    LVAD (left ventricular assist device) present  LVAD in place, continues to have runs of VT and remains on amiodarone  Management by primary      Hypertensive cardiovascular-renal disease, stage 1-4 or unspecified chronic kidney disease, with heart failure  Careful insulin titration with impaired renal function      Chronic combined systolic and diastolic heart failure  Managed per primary          María Brice MD  Endocrinology  [facility]

## 2022-07-30 NOTE — PROGRESS NOTES
[facility]  Cardiology  Progress Note     Hospital Length of Stay: 33    Interval History:  -s/p bedside bronchoscopy this am with mucus plug suction, FIO2 slightly increased FI02 60%    Vitals:  Temp:  [100.4 °F (38 °C)-101.66 °F (38.7 °C)] 101.66 °F (38.7 °C)  Pulse:  [] 160  Resp:  [19-33] 24  SpO2:  [95 %-99 %] 99 %  BP: (48-78)/(0) 70/0  Arterial Line BP: (66-84)/(57-79) 73/60    Intake/Output    Intake/Output Summary (Last 24 hours) at 7/30/2022 1639  Last data filed at 7/30/2022 1600  Gross per 24 hour   Intake 3615.88 ml   Output 3190 ml   Net 425.88 ml       Physical Exam  Gen: Intubated and sedated  HEENT: Pupils equal and reactive to light  Cardio: Regular rate, point of maximal impulse not displaced,1+ radial pulses bilaterally, 1+ DP pulses bilaterally, VAD hum obstructing heart sounds  Resp: crackles, rhonchi  Abd: Soft, non-tender, non-distended  : CLEO drainage  Skin: Warm, dry, peripheral edema  Neuro: intubated and sedated  Psych: unable to assess    Labs:  Recent Labs   Lab 07/29/22 1934 07/30/22 0415 07/30/22  1058   WBC 18.31* 14.92* 17.45*   HGB 7.4* 8.1* 7.4*   HCT 24.4* 26.7* 24.4*   * 461* 426     Recent Labs   Lab 07/29/22 1934 07/30/22 0415 07/30/22  1058    139 139   K 3.7 4.2 4.2    105 106   CO2 22* 19* 21*   BUN 77* 86* 91*   CREATININE 2.7* 2.8* 2.9*   * 135* 158*   CALCIUM 9.2 9.2 9.3   MG 2.2 2.8* 2.6   PHOS 4.1 3.5 3.8       Imaging:       Current Meds:   acetylcysteine 100 mg/ml (10%)  4 mL Nebulization Q6H    amiodarone  400 mg Oral TID    aspirin  81 mg Per OG tube Daily    calcium gluconate IVPB  1 g Intravenous Once    ceFEPime (MAXIPIME) IVPB  2 g Intravenous Q12H    cholestyramine  1 packet Per NG tube QID    guaiFENesin 100 mg/5 ml  300 mg Per NG tube Q6H    levalbuterol  0.63 mg Nebulization Q6H    methIMAzole  20 mg Per NG tube BID    metroNIDAZOLE  500 mg Oral Q8H    mexiletine  400 mg Oral Q8H     midazolam  2 mg Intravenous Once    midazolam        midodrine  15 mg Per NG tube Q8H    pantoprazole  40 mg Intravenous BID    polyethylene glycol  17 g Per NG tube Daily    predniSONE  40 mg Per NG tube Daily       Continuous Infusions:   amiodarone in dextrose 5% 1 mg/min (07/30/22 1600)    amiodarone in dextrose 5%      dexmedetomidine (PRECEDEX) infusion 0.8 mcg/kg/hr (07/30/22 1600)    dextrose 10 % in water (D10W)      EPINEPHrine 0.04 mcg/kg/min (07/30/22 1600)    furosemide (LASIX) 2 mg/mL continuous infusion (non-titrating) 2.5 mg/hr (07/30/22 1600)    heparin (porcine) in 5 % dex 2,000 Units/hr (07/30/22 1600)    LIDOcaine 1 mg/min (07/30/22 1600)    nitric oxide gas      NORepinephrine bitartrate-D5W 0.04 mcg/kg/min (07/30/22 1600)    propofoL Stopped (07/30/22 1027)    TPN ADULT CENTRAL LINE CUSTOM 42 mL/hr at 07/30/22 1600    TPN ADULT CENTRAL LINE CUSTOM      vasopressin 0.04 Units/min (07/30/22 1600)       PRN:  sodium chloride, sodium chloride 0.9%, sodium chloride 0.9%, acetaminophen, bisacodyL, dextrose 10 % in water (D10W), dextrose 10%, dextrose 10%, glucagon (human recombinant), HYDROmorphone, HYDROmorphone, hydrOXYzine HCL, insulin aspart U-100    Assessment and Plan:    57 Y/O M with Hx of stage D HFrEF (EF 10%) due to NICM underwent HM2 implant 3/8/18 and exchange of outflow graft 3/9/18, Hx VT/VF s/p SICD 2014 presenting with gradual worsening shortness of breath associated with nonpleuritic, nonradiating bilateral 4/10 retrosternal chest pressure pain.  Symptoms began yesterday and have gradually worsened in the last 12 hours.  He does report going to a family picnic with increased consumption of chicken wings, ribs and other salty meat products.  Prior to symptom onset, he reports nausea, nonbloody diarrhea began yesterday and single episode of nonbloody nonbilious vomiting this morning. He also complains of dizziness, lightheadedness, and a mild HA. SOB worsened this  morning prompting him to come to the ED.      In the ED, patient was in respiratory distress requiring BiPAP. MAP 96 and started on Nitro gtt. Work-up revealed WBC 10, K 5.4, Cr 2.5 (baseline 1.9), BNP 1950 (baseline 200-300s), Bili 2.1, LDH 1058, and INR 3.2. Bedside TTE with severely reduced EF, AV not opening, septum bulging into RV. Given IV Lasix 80 mg x1 with only 100-200 mL UOP and dark in color. HM2 interrogated and flows have been 8.5-9 L/min with no alarms or power surges. Cardiology consulted in the ED, dicussed with HTS, and decision made to admit to ICU for further mgmt.       7/30: CVP 16 with urine output of about 75-100cc/hr, cont current support with Epi 0.04, Vaso 0.04, Levo 0.06.     * Left ventricular assist device (LVAD) complication  The patient presented with COVID and found to have an uptrending LDH with increased power.  He underwent HM III upgrade on 7/13/22  -Daily LDH   -Continue Heparin drip     Procedure: Device Interrogation Including analysis of device parameters  Current Settings: Ventricular Assist Device     TXP LVAD INTERROGATIONS 7/29/2022 7/29/2022 7/29/2022 7/29/2022 7/29/2022 7/29/2022 7/29/2022   Type HeartMate3 HeartMate3 HeartMate3 HeartMate3 HeartMate3 HeartMate3 HeartMate3   Flow 5.2 5.1 5.0 5.0 5.1 5.2 5.1   Speed 6000 6000 6000 6000 6000 6000 6000   PI 3.5 3.4 3.4 3.4 3.4 3.5 2.4   Power (Jackson) 4.8 4.8 4.7 4.7 4.7 4.7 5.5   LSL 5600 5600 5600 5600 5600 5600 6000   Low Flow Alarm - - - - - - -   Pulsatility - Intermittent pulse Intermittent pulse Intermittent pulse Intermittent pulse Intermittent pulse Intermittent pulse         Anxiety  -Currently intubated and sedated     History of ventricular tachycardia  Patient experienced an episode of VT on 6/30 and experienced an ICD shock thought to be related to hypokalemia (K of 3.1 on 6/30)  - Aggressively replete K, keep K>4 and Mg>2  - Continue mexiletine PO, lidocaine gtt, and amiodarone gtt   - EP signed off and  recommending Amiodarone and Mexilitine, so would try to wean Lidocaine gtt if able  - Continue to monitor on telemetry     COVID-19 virus infection  -Previously hypoxic then recovered  -ID was consulted, recommended Remdesivir, course completed     Elevated serum lactate dehydrogenase (LDH)  S/p HM3 exchange for HM2 pump thrombosis  Trend LDH daily     amiodarone induced hypothyrodism  -Continue levothyroxine 112 mcg      Leukocytosis  Patient with leukocytosis (25K), previous fevers of unknown source.  Getting CT scan to look for abdominal or pulmonary source of sepsis.      LVAD (left ventricular assist device) present  Procedure: Device Interrogation Including analysis of device parameters  Current Settings: Ventricular Assist Device  Review of device function is stable      TXP LVAD INTERROGATIONS 7/30/2022 7/30/2022 7/30/2022 7/30/2022 7/30/2022 7/30/2022 7/30/2022   Type HeartMate3 HeartMate3 HeartMate3 HeartMate3 HeartMate3 HeartMate3 HeartMate3   Flow 5.1 5.1 5.6 5.6 5.6 5.7 5   Speed 5600 5600 6200 6200 6200 6200 6200   PI 1.4 1.3 1.3 1.4 1.5 1.5 3.2   Power (Jackson) 4.1 4.1 5.2 5.2 5.2 5.2 5.2   LSL - 5200 - - - 5800 -   Low Flow Alarm - - - - - - -   Pulsatility - Intermittent pulse - - - Intermittent pulse -     CKD (chronic kidney disease), stage III  WAGNER on CKD  Patient's baseline is 1.5-2.0  - Nephrology is following  - Avoiding nephrotoxic medications  - Continue to monitor  - Strict I/O's     Chronic combined systolic and diastolic heart failure  ADHF  Underwent HM III upgrade on 7/13/22, currently on VA ECMO  Currently on Epi gtt, Vasopressin  Currently on Precedex and propofol gtt for sedation  Being treated for sepsis of unknown origen.   Currently intubated and sedated (reintubarted 7/29)  Chest tube removal, bronch, and old driveline removed 7/22         Susannah Garza MD  [facility]  Cardiovascular Disease, PGY-IV      Staff attestation to follow.

## 2022-07-30 NOTE — EICU
Rounding (Video Assessment):  Yes    Intervention Initiated From:  Bedside    Esther Communicated with Bedside Nurse regarding:  Documentation    Nurse Notified:  Yes    Doctor Notified:  Yes    Comments: Versed 2 mg IVP per Adriana Chu RN prior to procedure for emergent DCCV @ BS.  1635 Synchronized cardioverted 120 j Atrial flutter 2:1 converted to SR rate 71. Tolerated well.

## 2022-07-30 NOTE — ASSESSMENT & PLAN NOTE
Tim Richards is a 56 y.o. male HFrEF with an EF of 10% and a history of HM2 LVAD in 2018 who is now s/p HM3 upgrade, initiation of V-A ECMO after failure to wean off bypass x2      Neuro/Psych:   -- Sedation: precedex, propofol   -- Pain: PRN Dilaudid             Cards:   -- S/P LVAD exchange on 7/14/22  --Improving pressor requirements, wean gwen for MAP > 75, keep other pressors the same   Epi 0.04   Levo off   Vaso 0.04   Giapreza off  -- Continue Midodrine 15 mg Q8  -- Stress dose steroids complete  -- MAP goal 75  -- VAD  flows stable  -- Decannulated on 7/19  -- Sternal closure on 7/15  -- Bedside drive line removal on 7/23  -- EP consult due to v-tach  -- Continue lidocaine, continue amio ggt, restarted mexiletine      Pulm:   -- Goal O2 sat > 90%  -- ABG PRN  -- Chest tubes removed  -- Nitric weaned at 20  -- Extubated 7/26, re intubated 7/28  -- Monitor O2 sat with ABGs  -- Bronch yesterday without significant findings, repeat today      Renal:  -- Keep sun for strict I/O  -- Trend BUN/Cr  -- Lasix gtt 2.5/hr        FEN / GI:   -- Replace lytes as needed  -- Nutrition: NPO,TPN  -- GI ppx: PPI  -- Bowel reg: miralax, docusate, mag citrate      ID:   -- Tm: febrile; WBC increasing   -- ABX cefepime, falgyl  -- Cultures sent, repeat cultures sent 7/22, no growth to date, repeat cultures sent 7/26 --> gram negative rods on sputum cultures  -- BAL GNR --> pansensitive klebsiella   -- Repeat sputum cultures --> pansensitive klebsiella  -- ID consult   -- daily vanc levels  -- Line exchange 7/21  -- UA with many bacteria and WBCs, culture pending  -- consider CT scan due to ongoing intermittent fevers and increasing WBC      Heme/Onc:   -- H/H stable   -- Daily CBC  -- Received 18 pRBCs, 16 FFP, 2 plt, 25 cryo; 1500 albumin intra-op  -- Heparin gtt at 1600, do not go above        Endo:   -- BG goal 140-180  -- SSI  -- Levothyroxine discontinued  -- hyperthyroid despite being hypothyroid at home  --  Endocrine consulted  -- Started on prednisone and methimazole       PPx:   Feeding: NPO, TPN  Analgesia/Sedation: Precedex, propofol / PRN Dilaudid  Thromboembolic prevention: SCDs, heparin gtt  HOB >30: Yes  Stress Ulcer ppx: PPI  Glucose control: Critical care goal 140-180 g/dl, ISS     Lines/Drains/Airway: NG; RIJ CVC, r-radial a-line, sun; WV, VAD; midline      Dispo/Code Status/Palliative:   -- SICU / Full Code.

## 2022-07-30 NOTE — NURSING
Notified INNA Amezcua MD of pt's MAP 65-67. Instructed to restart levo gtt. Also informed MD of ABG results.

## 2022-07-30 NOTE — ASSESSMENT & PLAN NOTE
Hyperglycemia noted once starting TF, no hx of DM  Begin Accuchecks q4h with low dose correction for continuous enteral feeding  Will add scheduled aspart if requiring correction regularly    - Will watch trend and adjust insulin as needed  - Please notify Endocrine if the tube feeds are stopped or restarted for any reason as insulin requirements will change

## 2022-07-30 NOTE — ASSESSMENT & PLAN NOTE
-Pt with hx of Amiodarone induced hyperthyroidism type 2, then developed Hypothyroidism.  - TRAb and TSI - negative. US unremarkable with low vascularity.   - After previous presentation of AIT, he was weaned off methimazole and prednisone by 5/2020  - Then developed hypothyroidism, LT4 started and dose titrated per TFTs    - Prior to current admission he was on LT4 112 mcg qd  - Labs c/w hypothyroidism until current admission, initially on 7/13, with low TSH and elevated fT4. Repeat TFTs on 7/27 with worsening hyperthyroidism. He continued to receive LT4 112 mcg through 7/28.  - He remains on amiodarone for episodes of VT  - Suspect current hyperthyroidism is AIT, however continued exogenous LT4 exacerbated/contributed to current presentation     Lab Results   Component Value Date    TSH <0.010 (L) 07/27/2022    [id number] 70 07/30/2022    B6DYBIC 9.5 11/11/2021    FREET4 3.25 (H) 07/30/2022       Recommendations:  - Although mechanism unknown, strongly suspect AIT (type 1 or 2) -- will start treatment for AIT type 2 empirically due to clinical implications in delayed treatment. Ultimately steroids and methimazole would be weaned based on response   - Methimazole 20mg BID and Prednisone 40mg qd  - Added Cholestyramine 4g QID  - Daily fT4 and T3  - We discontinued levothyroxine

## 2022-07-30 NOTE — CONSULTS
[facility] - [facility]  Cardiac Electrophysiology  Consult Note    Admission Date: 6/27/2022  Code Status: Full Code   Attending Provider: Yg Kaufman MD  Consulting Provider: Taco Jeronimo MD  Principal Problem:Left ventricular assist device (LVAD) complication    Inpatient consult to Electrophysiology  Consult performed by: Taco Jeronimo MD  Consult ordered by: Carlos Shrestha MD        Subjective:     Chief Complaint:  ADHF    HPI:   57 yo male with PMH of HFrEF with an EF of 10% s/p HM2 admitted on 6/27 with acute respiratory failure secondary to COVID complicated with LVAD pump thrombosis that was refractory to medical therapy (failed integrillin). Underwent LVAD pump exchange with HM3 on 7/13. Post-op course complicated with worsening cardiogenic shock/RV failure requiring [facility]-ECMO. Improved clinically underwent successful decannulation. He remains intubated, sedated and requiring pressors and requiring pressor support. On 7/21, EP was initially consulted consulted due to episode of MMVT resulting in SQ-ICD discharge. Discharge successful and restoration of NSR. He was started on amiodarone gtt and restarted on home mexiletine. On 7/24, patient developed more frequent episodes of non sustained VT while on amiodarone PO, lidocaine, and mexiletine. He was restarted on IV amiodarone with improvement of his VT.     On 7/30, EP re consulted for concerns of wide complex tachyarrhythmia. Patient remains intubated, sedated and on low dose levo/epi. Regarding antiarrhythmics he is currently on amiodarone gtt at 0.25, amiodarone 400 mg PO TID, mexiletine 400 mg q8hr. Upon tele review, appears to be in 2:1 atrial flutter with rate of 150. MAPs remain unchanged but nurse did note lower PI reads on VAD.       Past Medical History:   Diagnosis Date    Anticoagulant long-term use     CHF (congestive heart failure)     Class 1 obesity due to excess calories with serious comorbidity and body mass index (BMI)  of 31.0 to 31.9 in adult     Dilated cardiomyopathy 1/10/2018    Disorder of kidney and ureter     CKD    Encounter for blood transfusion     Gout     HTN (hypertension)     Hx of psychiatric care     ICD (implantable cardioverter-defibrillator) infection 7/1/2020    Psychiatric problem     Thyroid disease     Ventricular tachycardia (paroxysmal)        Past Surgical History:   Procedure Laterality Date    AORTIC VALVULOPLASTY N/A 7/13/2022    Procedure: REPAIR, AORTIC VALVE;  Surgeon: Yg Kaufman MD;  Location: [facility] OR 2ND FLR;  Service: Cardiovascular;  Laterality: N/A;    APPLICATION OF WOUND VACUUM-ASSISTED CLOSURE DEVICE N/A 7/15/2022    Procedure: APPLICATION, WOUND VAC;  Surgeon: Yg Kaufman MD;  Location: [facility] OR 2ND FLR;  Service: Cardiovascular;  Laterality: N/A;  50 x 5 cm     CARDIAC CATHETERIZATION  Dec. 2012    CARDIAC DEFIBRILLATOR PLACEMENT Left     CRRT-D    COLONOSCOPY N/A 3/6/2018    Procedure: COLONOSCOPY;  Surgeon: Alonso Bone MD;  Location: [facility] ENDO (2ND FLR);  Service: Endoscopy;  Laterality: N/A;    COLONOSCOPY N/A 7/17/2019    Procedure: COLONOSCOPY;  Surgeon: Blane Valdez MD;  Location: [facility] ENDO (2ND FLR);  Service: Endoscopy;  Laterality: N/A;    COLONOSCOPY N/A 7/18/2019    Procedure: COLONOSCOPY;  Surgeon: Blane Valdez MD;  Location: [facility] ENDO (2ND FLR);  Service: Endoscopy;  Laterality: N/A;    ESOPHAGOGASTRODUODENOSCOPY N/A 7/17/2019    Procedure: EGD (ESOPHAGOGASTRODUODENOSCOPY);  Surgeon: Blane Valdez MD;  Location: [facility] ENDO (2ND FLR);  Service: Endoscopy;  Laterality: N/A;    ESOPHAGOGASTRODUODENOSCOPY N/A 7/18/2019    Procedure: EGD (ESOPHAGOGASTRODUODENOSCOPY);  Surgeon: Blane Valdez MD;  Location: [facility] ENDO (2ND FLR);  Service: Endoscopy;  Laterality: N/A;    FOREIGN BODY REMOVAL N/A 7/22/2022    Procedure: REMOVAL, FOREIGN BODY;  Surgeon: Yg Kaufman MD;  Location: [facility] OR 2ND FLR;  Service: Cardiovascular;  Laterality: N/A;  LVAD Heartmate 2 drive  line removal    INSERTION OF GRAFT TO PERICARDIUM N/A 7/15/2022    Procedure: INSERTION, GRAFT, PERICARDIUM;  Surgeon: Yg Kaufman MD;  [facility];  Service: Cardiovascular;  Laterality: N/A;    IRRIGATION OF MEDIASTINUM N/A 7/15/2022    Procedure: IRRIGATION, MEDIASTINUM;  Surgeon: Yg Kaufman MD;  [facility];  Service: Cardiovascular;  Laterality: N/A;    LYSIS OF ADHESIONS  7/13/2022    Procedure: LYSIS, ADHESIONS;  Surgeon: Yg Kaufman MD;  [facility];  Service: Cardiovascular;;    NONINVASIVE CARDIAC ELECTROPHYSIOLOGY STUDY N/A 10/18/2019    Procedure: CARDIAC ELECTROPHYSIOLOGY STUDY, NONINVASIVE;  Surgeon: Raz Wagner MD;  Location: [facility] EP LAB;  Service: Cardiology;  Laterality: N/A;  VT, DFTs, MDT CRTD in situ, LVAD, anes, MB, 3098    REPLACEMENT OF IMPLANTABLE CARDIOVERTER-DEFIBRILLATOR (ICD) GENERATOR N/A 3/9/2020    Procedure: REPLACEMENT, ICD GENERATOR;  Surgeon: Harry Yun MD;  Location: [facility] EP LAB;  Service: Cardiology;  Laterality: N/A;  VT, ICD Gen Change and Lead Revision, MDT, MAC, DM,3 Prep    REPLACEMENT OF LEFT VENTRICULAR ASSIST DEVICE (LVAD)  7/13/2022    Procedure: REPLACEMENT, LVAD;  Surgeon: Yg Kaufman MD;  [facility];  Service: Cardiovascular;;    REPLACEMENT OF PUMP N/A 7/13/2022    Procedure: REPLACEMENT, PUMP;  Surgeon: Yg Kaufman MD;  [facility];  Service: Cardiovascular;  Laterality: N/A;  LVAD pump exchange  EXPLANATION OF HEATMATE 2  IMPLANTATION OF HEARTMATE 3  IMPLANTATION OF 8MM CHIMNEY GRAFT TO RFA  INITIATION OF ECMO  TEMPORARY CLOSURE OF CHEST    REVISION OF IMPLANTABLE CARDIOVERTER-DEFIBRILLATOR (ICD) ELECTRODE LEAD PLACEMENT N/A 3/9/2020    Procedure: REVISION, INSERTION, ELECTRODE LEAD, ICD;  Surgeon: Harry Yun MD;  Location: [facility] EP LAB;  Service: Cardiology;  Laterality: N/A;  VT, ICD Gen Change and Lead Revision, MDT, MAC, DM,3 Prep    STERNAL WOUND  CLOSURE N/A 7/14/2022    Procedure: CLOSURE, WOUND, STERNUM;  Surgeon: Yg Kaufman MD;  Location: [facility] OR [facility] FLR;  Service: Cardiovascular;  Laterality: N/A;  temporary closure  evacuation of hematoma    STERNAL WOUND CLOSURE N/A 7/15/2022    Procedure: CLOSURE, WOUND, STERNUM;  Surgeon: Yg Kaufman MD;  Location: [facility] OR [facility] FLR;  Service: Cardiovascular;  Laterality: N/A;    TREATMENT OF CARDIAC ARRHYTHMIA  10/18/2019    Procedure: Cardioversion or Defibrillation;  Surgeon: Raz Wagner MD;  Location: [facility] EP LAB;  Service: Cardiology;;       Review of patient's allergies indicates:   Allergen Reactions    Lisinopril Anaphylaxis    Hydralazine analogues      Chronic constipation, impotence, dizziness       No current facility-administered medications on file prior to encounter.     Current Outpatient Medications on File Prior to Encounter   Medication Sig    allopurinoL (ZYLOPRIM) 100 MG tablet TAKE 1 TABLET (100 MG) BY MOUTH NIGHTLY.    amiodarone (PACERONE) 200 MG Tab Take 2 tablets (400 mg total) by mouth once daily.    amLODIPine (NORVASC) 10 MG tablet TAKE 1 TABLET BY MOUTH EVERY DAY    aspirin (ECOTRIN) 81 MG EC tablet Take 1 tablet (81 mg total) by mouth once daily.    busPIRone (BUSPAR) 5 MG Tab Take 1 tablet (5 mg total) by mouth 3 (three) times daily.    levothyroxine (SYNTHROID) 112 MCG tablet Take 1 tablet (112 mcg total) by mouth before breakfast.    mexiletine (MEXITIL) 250 MG Cap Take 1 capsule (250 mg total) by mouth every 8 (eight) hours.    pantoprazole (PROTONIX) 40 MG tablet TAKE 1 TABLET (40 MG TOTAL) BY MOUTH DAILY WITH LUNCH.    warfarin (COUMADIN) 5 MG tablet TAKE 7.5 MG ORALLY DAILY EVERY SUNDAY AND THURSDAY, TAKE 5 MG ORALLY DAILY ON OTHER DAYS    [DISCONTINUED] ferrous gluconate (FERGON) 324 MG tablet TAKE 1 TABLET (324 MG TOTAL) BY MOUTH WITH LUNCH.    [DISCONTINUED] sildenafiL (VIAGRA) 100 MG tablet Take 1 tablet (100 mg total) by mouth daily as needed for  Erectile Dysfunction.    [DISCONTINUED] torsemide (DEMADEX) 20 MG Tab Take 1 tablet (20 mg total) by mouth once daily.     Family History       Problem Relation (Age of Onset)    Cancer Sister (54)    Coronary artery disease Father    Diabetes Father    Heart disease Father    Hypertension Father    No Known Problems Mother, Brother          Tobacco Use    Smoking status: Former Smoker     Packs/day: 1.00     Years: 31.00     Pack years: 31.00     Types: Cigarettes     Quit date: 2018     Years since quittin.5    Smokeless tobacco: Never Used    Tobacco comment: pt is quiting on his own - pt stated not qualified for program;  pt  quit on his own   Substance and Sexual Activity    Alcohol use: No     Alcohol/week: 0.0 standard drinks     Comment: quit    Drug use: No    Sexual activity: Yes     Partners: Female     Birth control/protection: None     Comment: 10/5/17  with same partner 7 years      Review of Systems   Unable to perform ROS: Intubated   Objective:     Vital Signs (Most Recent):  Temp: (!) 101.66 °F (38.7 °C) (22 1430)  Pulse: (!) 159 (22 1430)  Resp: (!) 24 (22 1334)  BP: (!) 70/0 (22 0800)  SpO2: 99 % (22 1300)   Vital Signs (24h Range):  Temp:  [100.04 °F (37.8 °C)-101.66 °F (38.7 °C)] 101.66 °F (38.7 °C)  Pulse:  [] 159  Resp:  [19-33] 24  SpO2:  [95 %-99 %] 99 %  BP: (48-82)/(0) 70/0  Arterial Line BP: (66-84)/(57-79) 77/63       Weight: 100.7 kg (222 lb)  Body mass index is 29.29 kg/m².    SpO2: 99 %  O2 Device (Oxygen Therapy): ventilator    Physical Exam  Constitutional:       General: He is not in acute distress.     Appearance: He is ill-appearing.   HENT:      Head: Normocephalic and atraumatic.      Nose: No congestion.      Mouth/Throat:      Mouth: Mucous membranes are moist.   Eyes:      Extraocular Movements: Extraocular movements intact.      Conjunctiva/sclera: Conjunctivae normal.   Cardiovascular:      Rate and Rhythm:  Regular rhythm. Tachycardia present.      Comments: Vad hum present   Pulmonary:      Effort: Pulmonary effort is normal. No respiratory distress.      Comments: Intubated  Abdominal:      General: Abdomen is flat.   Musculoskeletal:      Cervical back: Normal range of motion.      Right lower leg: No edema.      Left lower leg: No edema.   Skin:     General: Skin is warm.      Capillary Refill: Capillary refill takes less than 2 seconds.      Findings: No rash.   Neurological:      Mental Status: He is alert.      Comments: Sedated       Significant Labs: CMP:   Recent Labs   Lab 07/29/22 1934 07/30/22 0415 07/30/22  1058    139 139   K 3.7 4.2 4.2    105 106   CO2 22* 19* 21*   * 135* 158*   BUN 77* 86* 91*   CREATININE 2.7* 2.8* 2.9*   CALCIUM 9.2 9.2 9.3   PROT 6.5 7.0 6.8   ALBUMIN 2.0* 2.1* 2.1*   BILITOT 6.5* 7.1* 7.0*   ALKPHOS 102 112 103   AST 53* 44* 46*   ALT 38 38 35   ANIONGAP 13 15 12   ESTGFRAFRICA 29.3* 28.1* 26.9*   EGFRNONAA 25.4* 24.3* 23.3*   , CBC:   Recent Labs   Lab 07/29/22 1934 07/30/22 0415 07/30/22  1058   WBC 18.31* 14.92* 17.45*   HGB 7.4* 8.1* 7.4*   HCT 24.4* 26.7* 24.4*   * 461* 426   , and All pertinent lab results from the last 24 hours have been reviewed.                  Assessment and Plan:     Atrial flutter  55 yo male with PMH of HFrEF with an EF of 10% s/p HM2 admitted on 6/27 with acute respiratory failure secondary to COVID complicated with LVAD pump thrombosis s/p LVAD pump exchange with HM3 on 7/13. Post-op course complicated with worsening cardiogenic shock/RV failure requiring VA-ECMO On 7/30, EP re consulted for concerns of wide complex tachyarrhythmia. Patient remains intubated, sedated and on low dose levo/epi. Regarding antiarrhythmics he is currently on amiodarone gtt at 0.25, amiodarone 400 mg PO TID, mexiletine 400 mg q8hr. Upon tele review, appears to be in 2:1 atrial flutter with rate of 150. MAPs remain unchanged but nurse did  note lower PI reads on VAD.     Recommendations   - appears to be in 2:1 flutter on tele, hemodynamics appear unchanged  - would increase amiodarone to 1mg/min  - if patient becomes unstable, recommend bedside cardioversion (already sedated on propofol)   - will likely be difficult to keep in sinus rhythm if he converts given his critical illness   - minimize pressors as tolerated   - keep mag>2, K>4      Taco Jeronimo MD  Cardiac Electrophysiology  [facility]

## 2022-07-30 NOTE — SUBJECTIVE & OBJECTIVE
Past Medical History:   Diagnosis Date    Anticoagulant long-term use     CHF (congestive heart failure)     Class 1 obesity due to excess calories with serious comorbidity and body mass index (BMI) of 31.0 to 31.9 in adult     Dilated cardiomyopathy 1/10/2018    Disorder of kidney and ureter     CKD    Encounter for blood transfusion     Gout     HTN (hypertension)     Hx of psychiatric care     ICD (implantable cardioverter-defibrillator) infection 7/1/2020    Psychiatric problem     Thyroid disease     Ventricular tachycardia (paroxysmal)        Past Surgical History:   Procedure Laterality Date    AORTIC VALVULOPLASTY N/A 7/13/2022    Procedure: REPAIR, AORTIC VALVE;  Surgeon: Yg Kaufman MD;  Location: [facility] OR [facility] FLR;  Service: Cardiovascular;  Laterality: N/A;    APPLICATION OF WOUND VACUUM-ASSISTED CLOSURE DEVICE N/A 7/15/2022    Procedure: APPLICATION, WOUND VAC;  Surgeon: Yg Kaufman MD;  [facility];  Service: Cardiovascular;  Laterality: N/A;  50 x 5 cm     CARDIAC CATHETERIZATION  Dec. 2012    CARDIAC DEFIBRILLATOR PLACEMENT Left     CRRT-D    COLONOSCOPY N/A 3/6/2018    Procedure: COLONOSCOPY;  Surgeon: Alonso Bone MD;  Location: [facility] (2ND FLR);  Service: Endoscopy;  Laterality: N/A;    COLONOSCOPY N/A 7/17/2019    Procedure: COLONOSCOPY;  Surgeon: Blane Valdez MD;  Location: [facility] ENDO (2ND FLR);  Service: Endoscopy;  Laterality: N/A;    COLONOSCOPY N/A 7/18/2019    Procedure: COLONOSCOPY;  Surgeon: Blane Valdez MD;  Location: [facility] ENDO (2ND FLR);  Service: Endoscopy;  Laterality: N/A;    ESOPHAGOGASTRODUODENOSCOPY N/A 7/17/2019    Procedure: EGD (ESOPHAGOGASTRODUODENOSCOPY);  Surgeon: Blane Valdez MD;  Location: [facility] ENDO (2ND FLR);  Service: Endoscopy;  Laterality: N/A;    ESOPHAGOGASTRODUODENOSCOPY N/A 7/18/2019    Procedure: EGD (ESOPHAGOGASTRODUODENOSCOPY);  Surgeon: Blane Valdez MD;  Location: [facility] ENDO (2ND FLR);  Service: Endoscopy;  Laterality: N/A;    FOREIGN BODY REMOVAL  N/A 7/22/2022    Procedure: REMOVAL, FOREIGN BODY;  Surgeon: Yg Kaufman MD;  Location: [facility] OR [facility] FLR;  Service: Cardiovascular;  Laterality: N/A;  LVAD Heartmate 2 drive line removal    INSERTION OF GRAFT TO PERICARDIUM N/A 7/15/2022    Procedure: INSERTION, GRAFT, PERICARDIUM;  Surgeon: Yg Kaufman MD;  Location: [facility] OR [facility] FLR;  Service: Cardiovascular;  Laterality: N/A;    IRRIGATION OF MEDIASTINUM N/A 7/15/2022    Procedure: IRRIGATION, MEDIASTINUM;  Surgeon: Yg Kaufman MD;  [facility];  Service: Cardiovascular;  Laterality: N/A;    LYSIS OF ADHESIONS  7/13/2022    Procedure: LYSIS, ADHESIONS;  Surgeon: Yg Kaufman MD;  [facility];  Service: Cardiovascular;;    NONINVASIVE CARDIAC ELECTROPHYSIOLOGY STUDY N/A 10/18/2019    Procedure: CARDIAC ELECTROPHYSIOLOGY STUDY, NONINVASIVE;  Surgeon: Raz Wagner MD;  Location: [facility] EP LAB;  Service: Cardiology;  Laterality: N/A;  VT, DFTs, MDT CRTD in situ, LVAD, juarez MB, 3098    REPLACEMENT OF IMPLANTABLE CARDIOVERTER-DEFIBRILLATOR (ICD) GENERATOR N/A 3/9/2020    Procedure: REPLACEMENT, ICD GENERATOR;  Surgeon: Harry Yun MD;  Location: [facility] EP LAB;  Service: Cardiology;  Laterality: N/A;  VT, ICD Gen Change and Lead Revision, MDT, MAC, DM,3 Prep    REPLACEMENT OF LEFT VENTRICULAR ASSIST DEVICE (LVAD)  7/13/2022    Procedure: REPLACEMENT, LVAD;  Surgeon: Yg Kaufman MD;  [facility];  Service: Cardiovascular;;    REPLACEMENT OF PUMP N/A 7/13/2022    Procedure: REPLACEMENT, PUMP;  Surgeon: Yg Kaufman MD;  [facility];  Service: Cardiovascular;  Laterality: N/A;  LVAD pump exchange  EXPLANATION OF HEATMATE 2  IMPLANTATION OF HEARTMATE 3  IMPLANTATION OF 8MM CHIMNEY GRAFT TO RFA  INITIATION OF ECMO  TEMPORARY CLOSURE OF CHEST    REVISION OF IMPLANTABLE CARDIOVERTER-DEFIBRILLATOR (ICD) ELECTRODE LEAD PLACEMENT N/A 3/9/2020    Procedure: REVISION, INSERTION, ELECTRODE LEAD, ICD;   Surgeon: Harry Yun MD;  Location: [facility] EP LAB;  Service: Cardiology;  Laterality: N/A;  VT, ICD Gen Change and Lead Revision, MDT, MAC, DM,3 Prep    STERNAL WOUND CLOSURE N/A 7/14/2022    Procedure: CLOSURE, WOUND, STERNUM;  Surgeon: Yg Kaufman MD;  Location: [facility] OR 2ND FLR;  Service: Cardiovascular;  Laterality: N/A;  temporary closure  evacuation of hematoma    STERNAL WOUND CLOSURE N/A 7/15/2022    Procedure: CLOSURE, WOUND, STERNUM;  Surgeon: Yg Kaufman MD;  [facility];  Service: Cardiovascular;  Laterality: N/A;    TREATMENT OF CARDIAC ARRHYTHMIA  10/18/2019    Procedure: Cardioversion or Defibrillation;  Surgeon: Raz Wagner MD;  Location: [facility] EP LAB;  Service: Cardiology;;       Review of patient's allergies indicates:   Allergen Reactions    Lisinopril Anaphylaxis    Hydralazine analogues      Chronic constipation, impotence, dizziness       No current facility-administered medications on file prior to encounter.     Current Outpatient Medications on File Prior to Encounter   Medication Sig    allopurinoL (ZYLOPRIM) 100 MG tablet TAKE 1 TABLET (100 MG) BY MOUTH NIGHTLY.    amiodarone (PACERONE) 200 MG Tab Take 2 tablets (400 mg total) by mouth once daily.    amLODIPine (NORVASC) 10 MG tablet TAKE 1 TABLET BY MOUTH EVERY DAY    aspirin (ECOTRIN) 81 MG EC tablet Take 1 tablet (81 mg total) by mouth once daily.    busPIRone (BUSPAR) 5 MG Tab Take 1 tablet (5 mg total) by mouth 3 (three) times daily.    levothyroxine (SYNTHROID) 112 MCG tablet Take 1 tablet (112 mcg total) by mouth before breakfast.    mexiletine (MEXITIL) 250 MG Cap Take 1 capsule (250 mg total) by mouth every 8 (eight) hours.    pantoprazole (PROTONIX) 40 MG tablet TAKE 1 TABLET (40 MG TOTAL) BY MOUTH DAILY WITH LUNCH.    warfarin (COUMADIN) 5 MG tablet TAKE 7.5 MG ORALLY DAILY EVERY SUNDAY AND THURSDAY, TAKE 5 MG ORALLY DAILY ON OTHER DAYS    [DISCONTINUED] ferrous gluconate (FERGON) 324 MG tablet TAKE  1 TABLET (324 MG TOTAL) BY MOUTH WITH LUNCH.    [DISCONTINUED] sildenafiL (VIAGRA) 100 MG tablet Take 1 tablet (100 mg total) by mouth daily as needed for Erectile Dysfunction.    [DISCONTINUED] torsemide (DEMADEX) 20 MG Tab Take 1 tablet (20 mg total) by mouth once daily.     Family History       Problem Relation (Age of Onset)    Cancer Sister (54)    Coronary artery disease Father    Diabetes Father    Heart disease Father    Hypertension Father    No Known Problems Mother, Brother          Tobacco Use    Smoking status: Former Smoker     Packs/day: 1.00     Years: 31.00     Pack years: 31.00     Types: Cigarettes     Quit date: 2018     Years since quittin.5    Smokeless tobacco: Never Used    Tobacco comment: pt is quiting on his own - pt stated not qualified for program;  pt  quit on his own   Substance and Sexual Activity    Alcohol use: No     Alcohol/week: 0.0 standard drinks     Comment: quit    Drug use: No    Sexual activity: Yes     Partners: Female     Birth control/protection: None     Comment: 10/5/17  with same partner 7 years      Review of Systems   Unable to perform ROS: Intubated   Objective:     Vital Signs (Most Recent):  Temp: (!) 101.66 °F (38.7 °C) (22 1430)  Pulse: (!) 159 (22 1430)  Resp: (!) 24 (22 1334)  BP: (!) 70/0 (22 0800)  SpO2: 99 % (22 1300)   Vital Signs (24h Range):  Temp:  [100.04 °F (37.8 °C)-101.66 °F (38.7 °C)] 101.66 °F (38.7 °C)  Pulse:  [] 159  Resp:  [19-33] 24  SpO2:  [95 %-99 %] 99 %  BP: (48-82)/(0) 70/0  Arterial Line BP: (66-84)/(57-79) 77/63       Weight: 100.7 kg (222 lb)  Body mass index is 29.29 kg/m².    SpO2: 99 %  O2 Device (Oxygen Therapy): ventilator    Physical Exam  Constitutional:       General: He is not in acute distress.     Appearance: He is ill-appearing.   HENT:      Head: Normocephalic and atraumatic.      Nose: No congestion.      Mouth/Throat:      Mouth: Mucous membranes are moist.    Eyes:      Extraocular Movements: Extraocular movements intact.      Conjunctiva/sclera: Conjunctivae normal.   Cardiovascular:      Rate and Rhythm: Regular rhythm. Tachycardia present.      Comments: Vad hum present   Pulmonary:      Effort: Pulmonary effort is normal. No respiratory distress.      Comments: Intubated  Abdominal:      General: Abdomen is flat.   Musculoskeletal:      Cervical back: Normal range of motion.      Right lower leg: No edema.      Left lower leg: No edema.   Skin:     General: Skin is warm.      Capillary Refill: Capillary refill takes less than 2 seconds.      Findings: No rash.   Neurological:      Mental Status: He is alert.      Comments: Sedated       Significant Labs: CMP:   Recent Labs   Lab 07/29/22 1934 07/30/22 0415 07/30/22  1058    139 139   K 3.7 4.2 4.2    105 106   CO2 22* 19* 21*   * 135* 158*   BUN 77* 86* 91*   CREATININE 2.7* 2.8* 2.9*   CALCIUM 9.2 9.2 9.3   PROT 6.5 7.0 6.8   ALBUMIN 2.0* 2.1* 2.1*   BILITOT 6.5* 7.1* 7.0*   ALKPHOS 102 112 103   AST 53* 44* 46*   ALT 38 38 35   ANIONGAP 13 15 12   ESTGFRAFRICA 29.3* 28.1* 26.9*   EGFRNONAA 25.4* 24.3* 23.3*   , CBC:   Recent Labs   Lab 07/29/22 1934 07/30/22 0415 07/30/22  1058   WBC 18.31* 14.92* 17.45*   HGB 7.4* 8.1* 7.4*   HCT 24.4* 26.7* 24.4*   * 461* 426   , and All pertinent lab results from the last 24 hours have been reviewed.

## 2022-07-30 NOTE — SUBJECTIVE & OBJECTIVE
"Interval HPI:   Overnight events: Added cholestyramine for hyperthyroidism yesterday. Increasing pressor requirement still. fT4 still up-trending with normal T3. TF started and hyperglycemia noted. Although infrequent accuchecks currently.  Eating:   NPO  Nausea: No  Hypoglycemia and intervention: No  Fever: Yes  TPN and/or TF: Yes  If yes, type of TF/TPN and rate: Peptamen 1.5, ordered for rate 30mL/h, current rate is 42mL/hr    BP (!) 70/0   Pulse 79   Temp (!) 100.76 °F (38.2 °C)   Resp (!) 25   Ht 6' 1" (1.854 m)   Wt 100.7 kg (222 lb)   SpO2 95%   BMI 29.29 kg/m²     Labs Reviewed and Include    Recent Labs   Lab 07/30/22  0415   *   CALCIUM 9.2   ALBUMIN 2.1*   PROT 7.0      K 4.2   CO2 19*      BUN 86*   CREATININE 2.8*   ALKPHOS 112   ALT 38   AST 44*   BILITOT 7.1*     Lab Results   Component Value Date    WBC 14.92 (H) 07/30/2022    HGB 8.1 (L) 07/30/2022    HCT 26.7 (L) 07/30/2022    MCV 93 07/30/2022     (H) 07/30/2022     Recent Labs   Lab 07/27/22  0348 07/29/22  0218 07/30/22  0415   TSH <0.010*  --   --    FREET4 2.45* 2.71* 3.25*     Lab Results   Component Value Date    HGBA1C 5.4 04/26/2021       Nutritional status:   Body mass index is 29.29 kg/m².  Lab Results   Component Value Date    ALBUMIN 2.1 (L) 07/30/2022    ALBUMIN 2.0 (L) 07/29/2022    ALBUMIN 2.1 (L) 07/29/2022     Lab Results   Component Value Date    PREALBUMIN 13 (L) 07/29/2022    PREALBUMIN 11 (L) 07/17/2022    PREALBUMIN 10 (L) 07/15/2022       Estimated Creatinine Clearance: 37.2 mL/min (A) (based on SCr of 2.8 mg/dL (H)).    Accu-Checks  No results for input(s): POCTGLUCOSE in the last 72 hours.    Current Medications and/or Treatments Impacting Glycemic Control  Immunotherapy:    Immunosuppressants       None          Steroids:   Hormones (From admission, onward)                Start     Stop Route Frequency Ordered    07/28/22 1545  predniSONE tablet 40 mg         -- PER NG TUBE Daily 07/28/22 " 1542    07/15/22 0900  vasopressin (PITRESSIN) 1 Units/mL in dextrose 5 % 100 mL infusion         -- IV Continuous 07/15/22 0900          Pressors:    Autonomic Drugs (From admission, onward)                Start     Stop Route Frequency Ordered    07/29/22 0011  EPINEPHrine (ADRENALIN) 1 mg/mL injection        Note to Pharmacy: Created by cabinet override    07/29 1214   07/29/22 0011    07/29/22 0007  EPINEPHrine 5 mg in dextrose 5% 250 mL infusion (premix)        Question Answer Comment   Begin at (in mcg/kg/min): 0.01    Titrate by: (in mcg/kg/min) 0.01    Titrate interval: (in minutes) 10    Titrate to maintain: (SBP or MAP or Cardiac Index) MAP    Greater than: (in mmHg) 65    Maximum dose: (in mcg/kg/min) 2        -- IV Continuous 07/29/22 0008    07/23/22 1400  midodrine tablet 15 mg         -- PER NG TUBE Every 8 hours 07/23/22 0944    07/15/22 0900  NORepinephrine 8 mg in dextrose 5% 250 mL infusion        Question Answer Comment   Begin at (in mcg/kg/min): 0.1    Titrate by: (in mcg/kg/min) 0.02    Titrate interval: (in minutes) 20    Titrate to maintain: (MAP or SBP) MAP    Greater than: (in mmHg) 65    Maximum dose: (in mcg/kg/min) 0.2        -- IV Continuous 07/15/22 0900          Hyperglycemia/Diabetes Medications:   Antihyperglycemics (From admission, onward)                Start     Stop Route Frequency Ordered    07/29/22 1225  insulin aspart U-100 pen 1-10 Units         -- SubQ Every 4 hours PRN 07/29/22 1129

## 2022-07-30 NOTE — PROGRESS NOTES
Dr. Kaufman informed temp 101.7 despite PRN tylenol and mult fans on pt. After last amio bolus HR down to 155 but then back up to 162. PI still 1.3. order received to decrease VAD speed to 5600.   Anti Xa 0.15- heparin gtt increased to 2000untis/hr.

## 2022-07-30 NOTE — EICU
Rounding (Video Assessment):  Yes    Intervention Initiated From:  Bedside    Esther Communicated with Bedside Nurse regarding:  ECG Change    Nurse Notified: Adriana Chu, RN  Yes    Doctor Notified:  No    Comments: Video into unit HR went from 80-NSR to 152 appears to be Aflutter 2:1 vs SVT, but unsure. Arterial line pressures adequate. LVAD flow 5.6. Adriana @ BS. 12 lead in progress. [facility]

## 2022-07-30 NOTE — ASSESSMENT & PLAN NOTE
Recurrent VT requiring multiple antiarrhythmics, including continued, long-term need for amiodarone

## 2022-07-30 NOTE — PROGRESS NOTES
Dr. Kaufman informed temp 101.1, loss of art line, awaiting replacement. Doppler BP this AM 48/0 levo gtt restarted @ 0.02mcg/kg/min with increase in doppler BP to the 60s. States to increase levo gtt to 0.04mcg/kg/min.   Updated on 0400 ABG results, Dr. Emmanuel increased PEEP to 10 on rounds.  CVP 16, UOP 75mL/hr. Lasix 10mg IVP given, lasix gtt increased to 2.5mg/hr.  States to bronch pt.

## 2022-07-30 NOTE — NURSING
Spoke with BRYSON Kaufman MD over the phone about pt's MAP maintaining below goal of 70 with levo gtt running and requiring up-titrations to meet goal. Instructed to decrease sedation and new MAP goal >65 for the night.

## 2022-07-31 LAB
ALBUMIN SERPL BCP-MCNC: 1.9 G/DL (ref 3.5–5.2)
ALLENS TEST: ABNORMAL
ALLENS TEST: ABNORMAL
ALLENS TEST: NORMAL
ALP SERPL-CCNC: 88 U/L (ref 55–135)
ALP SERPL-CCNC: 91 U/L (ref 55–135)
ALP SERPL-CCNC: 93 U/L (ref 55–135)
ALT SERPL W/O P-5'-P-CCNC: 29 U/L (ref 10–44)
ALT SERPL W/O P-5'-P-CCNC: 30 U/L (ref 10–44)
ALT SERPL W/O P-5'-P-CCNC: 31 U/L (ref 10–44)
ANION GAP SERPL CALC-SCNC: 11 MMOL/L (ref 8–16)
ANION GAP SERPL CALC-SCNC: 12 MMOL/L (ref 8–16)
ANION GAP SERPL CALC-SCNC: 12 MMOL/L (ref 8–16)
APTT BLDCRRT: 43.5 SEC (ref 21–32)
AST SERPL-CCNC: 33 U/L (ref 10–40)
AST SERPL-CCNC: 34 U/L (ref 10–40)
AST SERPL-CCNC: 37 U/L (ref 10–40)
BACTERIA BLD CULT: NORMAL
BACTERIA BLD CULT: NORMAL
BASOPHILS # BLD AUTO: 0.01 K/UL (ref 0–0.2)
BASOPHILS # BLD AUTO: 0.01 K/UL (ref 0–0.2)
BASOPHILS # BLD AUTO: 0.02 K/UL (ref 0–0.2)
BASOPHILS NFR BLD: 0.1 % (ref 0–1.9)
BILIRUB SERPL-MCNC: 5.7 MG/DL (ref 0.1–1)
BILIRUB SERPL-MCNC: 6.2 MG/DL (ref 0.1–1)
BILIRUB SERPL-MCNC: 6.3 MG/DL (ref 0.1–1)
BUN SERPL-MCNC: 83 MG/DL (ref 6–20)
BUN SERPL-MCNC: 83 MG/DL (ref 6–20)
BUN SERPL-MCNC: 84 MG/DL (ref 6–20)
CALCIUM SERPL-MCNC: 9.1 MG/DL (ref 8.7–10.5)
CALCIUM SERPL-MCNC: 9.2 MG/DL (ref 8.7–10.5)
CALCIUM SERPL-MCNC: 9.2 MG/DL (ref 8.7–10.5)
CHLORIDE SERPL-SCNC: 106 MMOL/L (ref 95–110)
CHLORIDE SERPL-SCNC: 107 MMOL/L (ref 95–110)
CHLORIDE SERPL-SCNC: 107 MMOL/L (ref 95–110)
CO2 SERPL-SCNC: 20 MMOL/L (ref 23–29)
CO2 SERPL-SCNC: 20 MMOL/L (ref 23–29)
CO2 SERPL-SCNC: 21 MMOL/L (ref 23–29)
CREAT SERPL-MCNC: 2.2 MG/DL (ref 0.5–1.4)
CREAT SERPL-MCNC: 2.3 MG/DL (ref 0.5–1.4)
CREAT SERPL-MCNC: 2.5 MG/DL (ref 0.5–1.4)
DELSYS: ABNORMAL
DELSYS: NORMAL
DIFFERENTIAL METHOD: ABNORMAL
EOSINOPHIL # BLD AUTO: 0 K/UL (ref 0–0.5)
EOSINOPHIL NFR BLD: 0.1 % (ref 0–8)
EOSINOPHIL NFR BLD: 0.1 % (ref 0–8)
EOSINOPHIL NFR BLD: 0.2 % (ref 0–8)
ERYTHROCYTE [DISTWIDTH] IN BLOOD BY AUTOMATED COUNT: 21.4 % (ref 11.5–14.5)
ERYTHROCYTE [DISTWIDTH] IN BLOOD BY AUTOMATED COUNT: 21.5 % (ref 11.5–14.5)
ERYTHROCYTE [DISTWIDTH] IN BLOOD BY AUTOMATED COUNT: 21.9 % (ref 11.5–14.5)
ERYTHROCYTE [SEDIMENTATION RATE] IN BLOOD BY WESTERGREN METHOD: 24 MM/H
ERYTHROCYTE [SEDIMENTATION RATE] IN BLOOD BY WESTERGREN METHOD: 24 MM/H
EST. GFR  (AFRICAN AMERICAN): 32.2 ML/MIN/1.73 M^2
EST. GFR  (AFRICAN AMERICAN): 35.6 ML/MIN/1.73 M^2
EST. GFR  (AFRICAN AMERICAN): 37.6 ML/MIN/1.73 M^2
EST. GFR  (NON AFRICAN AMERICAN): 27.9 ML/MIN/1.73 M^2
EST. GFR  (NON AFRICAN AMERICAN): 30.8 ML/MIN/1.73 M^2
EST. GFR  (NON AFRICAN AMERICAN): 32.5 ML/MIN/1.73 M^2
FACT X PPP CHRO-ACNC: 0.16 IU/ML (ref 0.3–0.7)
FACT X PPP CHRO-ACNC: 0.19 IU/ML (ref 0.3–0.7)
FACT X PPP CHRO-ACNC: 0.22 IU/ML (ref 0.3–0.7)
FACT X PPP CHRO-ACNC: 0.23 IU/ML (ref 0.3–0.7)
FIBRINOGEN PPP-MCNC: 790 MG/DL (ref 182–400)
FIO2: 60
FIO2: 60
GLUCOSE SERPL-MCNC: 156 MG/DL (ref 70–110)
GLUCOSE SERPL-MCNC: 183 MG/DL (ref 70–110)
GLUCOSE SERPL-MCNC: 194 MG/DL (ref 70–110)
HCO3 UR-SCNC: 21.3 MMOL/L (ref 24–28)
HCT VFR BLD AUTO: 23.4 % (ref 40–54)
HCT VFR BLD AUTO: 23.7 % (ref 40–54)
HCT VFR BLD AUTO: 23.7 % (ref 40–54)
HCT VFR BLD CALC: 26 %PCV (ref 36–54)
HGB BLD-MCNC: 7.2 G/DL (ref 14–18)
HGB BLD-MCNC: 7.3 G/DL (ref 14–18)
HGB BLD-MCNC: 7.4 G/DL (ref 14–18)
IMM GRANULOCYTES # BLD AUTO: 0.17 K/UL (ref 0–0.04)
IMM GRANULOCYTES # BLD AUTO: 0.24 K/UL (ref 0–0.04)
IMM GRANULOCYTES # BLD AUTO: 0.24 K/UL (ref 0–0.04)
IMM GRANULOCYTES NFR BLD AUTO: 1.1 % (ref 0–0.5)
IMM GRANULOCYTES NFR BLD AUTO: 1.5 % (ref 0–0.5)
IMM GRANULOCYTES NFR BLD AUTO: 1.5 % (ref 0–0.5)
INR PPP: 1.3 (ref 0.8–1.2)
INR PPP: 1.4 (ref 0.8–1.2)
INR PPP: 1.4 (ref 0.8–1.2)
LDH SERPL L TO P-CCNC: 1.03 MMOL/L (ref 0.36–1.25)
LDH SERPL L TO P-CCNC: 529 U/L (ref 110–260)
LYMPHOCYTES # BLD AUTO: 0.3 K/UL (ref 1–4.8)
LYMPHOCYTES # BLD AUTO: 0.4 K/UL (ref 1–4.8)
LYMPHOCYTES # BLD AUTO: 0.7 K/UL (ref 1–4.8)
LYMPHOCYTES NFR BLD: 1.9 % (ref 18–48)
LYMPHOCYTES NFR BLD: 2.5 % (ref 18–48)
LYMPHOCYTES NFR BLD: 4.4 % (ref 18–48)
MAGNESIUM SERPL-MCNC: 2.1 MG/DL (ref 1.6–2.6)
MAGNESIUM SERPL-MCNC: 2.2 MG/DL (ref 1.6–2.6)
MAGNESIUM SERPL-MCNC: 2.5 MG/DL (ref 1.6–2.6)
MCH RBC QN AUTO: 28 PG (ref 27–31)
MCH RBC QN AUTO: 28.6 PG (ref 27–31)
MCH RBC QN AUTO: 28.8 PG (ref 27–31)
MCHC RBC AUTO-ENTMCNC: 30.4 G/DL (ref 32–36)
MCHC RBC AUTO-ENTMCNC: 31.2 G/DL (ref 32–36)
MCHC RBC AUTO-ENTMCNC: 31.2 G/DL (ref 32–36)
MCV RBC AUTO: 92 FL (ref 82–98)
METHEMOGLOBIN: 1.6 % (ref 0–3)
MIN VOL: 12.9
MIN VOL: 12.9
MODE: ABNORMAL
MODE: NORMAL
MONOCYTES # BLD AUTO: 1.3 K/UL (ref 0.3–1)
MONOCYTES NFR BLD: 8.2 % (ref 4–15)
MONOCYTES NFR BLD: 8.5 % (ref 4–15)
MONOCYTES NFR BLD: 8.6 % (ref 4–15)
NEUTROPHILS # BLD AUTO: 13.3 K/UL (ref 1.8–7.7)
NEUTROPHILS # BLD AUTO: 13.3 K/UL (ref 1.8–7.7)
NEUTROPHILS # BLD AUTO: 14.1 K/UL (ref 1.8–7.7)
NEUTROPHILS NFR BLD: 85.3 % (ref 38–73)
NEUTROPHILS NFR BLD: 87.6 % (ref 38–73)
NEUTROPHILS NFR BLD: 88.2 % (ref 38–73)
NRBC BLD-RTO: 1 /100 WBC
PCO2 BLDA: 35 MMHG (ref 35–45)
PCO2 BLDA: 42.5 MMHG (ref 35–45)
PEEP: 10
PEEP: 10
PH SMN: 7.35 [PH] (ref 7.35–7.45)
PH SMN: 7.39 [PH] (ref 7.35–7.45)
PHOSPHATE SERPL-MCNC: 2.5 MG/DL (ref 2.7–4.5)
PHOSPHATE SERPL-MCNC: 3.6 MG/DL (ref 2.7–4.5)
PHOSPHATE SERPL-MCNC: 3.9 MG/DL (ref 2.7–4.5)
PIP: 33
PIP: 33
PLATELET # BLD AUTO: 387 K/UL (ref 150–450)
PLATELET # BLD AUTO: 398 K/UL (ref 150–450)
PLATELET # BLD AUTO: 401 K/UL (ref 150–450)
PMV BLD AUTO: 10.8 FL (ref 9.2–12.9)
PMV BLD AUTO: 11.1 FL (ref 9.2–12.9)
PMV BLD AUTO: 11.3 FL (ref 9.2–12.9)
PO2 BLDA: 226 MMHG (ref 80–100)
PO2 BLDA: 41 MMHG (ref 40–60)
POC BE: -2 MMOL/L
POC BE: -4 MMOL/L
POC SATURATED O2: 100 % (ref 95–100)
POC SATURATED O2: 74 % (ref 95–100)
POC TCO2: 22 MMOL/L (ref 23–27)
POCT GLUCOSE: 104 MG/DL (ref 70–110)
POCT GLUCOSE: 105 MG/DL (ref 70–110)
POCT GLUCOSE: 105 MG/DL (ref 70–110)
POCT GLUCOSE: 106 MG/DL (ref 70–110)
POCT GLUCOSE: 112 MG/DL (ref 70–110)
POCT GLUCOSE: 113 MG/DL (ref 70–110)
POCT GLUCOSE: 114 MG/DL (ref 70–110)
POCT GLUCOSE: 114 MG/DL (ref 70–110)
POCT GLUCOSE: 115 MG/DL (ref 70–110)
POCT GLUCOSE: 116 MG/DL (ref 70–110)
POCT GLUCOSE: 116 MG/DL (ref 70–110)
POCT GLUCOSE: 118 MG/DL (ref 70–110)
POCT GLUCOSE: 119 MG/DL (ref 70–110)
POCT GLUCOSE: 121 MG/DL (ref 70–110)
POCT GLUCOSE: 121 MG/DL (ref 70–110)
POCT GLUCOSE: 122 MG/DL (ref 70–110)
POCT GLUCOSE: 122 MG/DL (ref 70–110)
POCT GLUCOSE: 123 MG/DL (ref 70–110)
POCT GLUCOSE: 123 MG/DL (ref 70–110)
POCT GLUCOSE: 125 MG/DL (ref 70–110)
POCT GLUCOSE: 126 MG/DL (ref 70–110)
POCT GLUCOSE: 126 MG/DL (ref 70–110)
POCT GLUCOSE: 129 MG/DL (ref 70–110)
POCT GLUCOSE: 129 MG/DL (ref 70–110)
POCT GLUCOSE: 130 MG/DL (ref 70–110)
POCT GLUCOSE: 130 MG/DL (ref 70–110)
POCT GLUCOSE: 131 MG/DL (ref 70–110)
POCT GLUCOSE: 131 MG/DL (ref 70–110)
POCT GLUCOSE: 132 MG/DL (ref 70–110)
POCT GLUCOSE: 133 MG/DL (ref 70–110)
POCT GLUCOSE: 135 MG/DL (ref 70–110)
POCT GLUCOSE: 136 MG/DL (ref 70–110)
POCT GLUCOSE: 142 MG/DL (ref 70–110)
POCT GLUCOSE: 144 MG/DL (ref 70–110)
POCT GLUCOSE: 145 MG/DL (ref 70–110)
POCT GLUCOSE: 147 MG/DL (ref 70–110)
POCT GLUCOSE: 148 MG/DL (ref 70–110)
POCT GLUCOSE: 150 MG/DL (ref 70–110)
POCT GLUCOSE: 154 MG/DL (ref 70–110)
POCT GLUCOSE: 157 MG/DL (ref 70–110)
POCT GLUCOSE: 159 MG/DL (ref 70–110)
POCT GLUCOSE: 162 MG/DL (ref 70–110)
POCT GLUCOSE: 162 MG/DL (ref 70–110)
POCT GLUCOSE: 166 MG/DL (ref 70–110)
POCT GLUCOSE: 179 MG/DL (ref 70–110)
POCT GLUCOSE: 183 MG/DL (ref 70–110)
POCT GLUCOSE: 184 MG/DL (ref 70–110)
POCT GLUCOSE: 192 MG/DL (ref 70–110)
POCT GLUCOSE: 199 MG/DL (ref 70–110)
POCT GLUCOSE: 98 MG/DL (ref 70–110)
POCT GLUCOSE: 98 MG/DL (ref 70–110)
POCT GLUCOSE: 99 MG/DL (ref 70–110)
POTASSIUM SERPL-SCNC: 4 MMOL/L (ref 3.5–5.1)
POTASSIUM SERPL-SCNC: 4.8 MMOL/L (ref 3.5–5.1)
POTASSIUM SERPL-SCNC: 4.8 MMOL/L (ref 3.5–5.1)
PROT SERPL-MCNC: 6.5 G/DL (ref 6–8.4)
PROT SERPL-MCNC: 6.7 G/DL (ref 6–8.4)
PROT SERPL-MCNC: 6.8 G/DL (ref 6–8.4)
PROTHROMBIN TIME: 13.5 SEC (ref 9–12.5)
PROTHROMBIN TIME: 14 SEC (ref 9–12.5)
PROTHROMBIN TIME: 14.6 SEC (ref 9–12.5)
RBC # BLD AUTO: 2.55 M/UL (ref 4.6–6.2)
RBC # BLD AUTO: 2.57 M/UL (ref 4.6–6.2)
RBC # BLD AUTO: 2.57 M/UL (ref 4.6–6.2)
SAMPLE: ABNORMAL
SAMPLE: ABNORMAL
SAMPLE: NORMAL
SITE: ABNORMAL
SITE: ABNORMAL
SITE: NORMAL
SODIUM SERPL-SCNC: 138 MMOL/L (ref 136–145)
SODIUM SERPL-SCNC: 139 MMOL/L (ref 136–145)
SODIUM SERPL-SCNC: 139 MMOL/L (ref 136–145)
T3 SERPL-MCNC: 47 NG/DL (ref 60–180)
T4 FREE SERPL-MCNC: 3.02 NG/DL (ref 0.71–1.51)
VT: 550
VT: 550
WBC # BLD AUTO: 15.14 K/UL (ref 3.9–12.7)
WBC # BLD AUTO: 15.56 K/UL (ref 3.9–12.7)
WBC # BLD AUTO: 15.94 K/UL (ref 3.9–12.7)

## 2022-07-31 PROCEDURE — 83735 ASSAY OF MAGNESIUM: CPT | Performed by: THORACIC SURGERY (CARDIOTHORACIC VASCULAR SURGERY)

## 2022-07-31 PROCEDURE — 93750 INTERROGATION VAD IN PERSON: CPT | Mod: ,,, | Performed by: INTERNAL MEDICINE

## 2022-07-31 PROCEDURE — 25000003 PHARM REV CODE 250

## 2022-07-31 PROCEDURE — 82803 BLOOD GASES ANY COMBINATION: CPT

## 2022-07-31 PROCEDURE — 85610 PROTHROMBIN TIME: CPT | Mod: 91 | Performed by: THORACIC SURGERY (CARDIOTHORACIC VASCULAR SURGERY)

## 2022-07-31 PROCEDURE — C9113 INJ PANTOPRAZOLE SODIUM, VIA: HCPCS

## 2022-07-31 PROCEDURE — 84480 ASSAY TRIIODOTHYRONINE (T3): CPT | Performed by: STUDENT IN AN ORGANIZED HEALTH CARE EDUCATION/TRAINING PROGRAM

## 2022-07-31 PROCEDURE — 94640 AIRWAY INHALATION TREATMENT: CPT

## 2022-07-31 PROCEDURE — 99291 PR CRITICAL CARE, E/M 30-74 MINUTES: ICD-10-PCS | Mod: ,,, | Performed by: INTERNAL MEDICINE

## 2022-07-31 PROCEDURE — 63600175 PHARM REV CODE 636 W HCPCS

## 2022-07-31 PROCEDURE — 99233 PR SUBSEQUENT HOSPITAL CARE,LEVL III: ICD-10-PCS | Mod: ,,, | Performed by: ANESTHESIOLOGY

## 2022-07-31 PROCEDURE — 63600367 HC NITRIC OXIDE PER HOUR

## 2022-07-31 PROCEDURE — 63600175 PHARM REV CODE 636 W HCPCS: Performed by: THORACIC SURGERY (CARDIOTHORACIC VASCULAR SURGERY)

## 2022-07-31 PROCEDURE — 25000003 PHARM REV CODE 250: Performed by: THORACIC SURGERY (CARDIOTHORACIC VASCULAR SURGERY)

## 2022-07-31 PROCEDURE — 20000000 HC ICU ROOM

## 2022-07-31 PROCEDURE — 25000242 PHARM REV CODE 250 ALT 637 W/ HCPCS: Performed by: THORACIC SURGERY (CARDIOTHORACIC VASCULAR SURGERY)

## 2022-07-31 PROCEDURE — 25000003 PHARM REV CODE 250: Performed by: STUDENT IN AN ORGANIZED HEALTH CARE EDUCATION/TRAINING PROGRAM

## 2022-07-31 PROCEDURE — 85384 FIBRINOGEN ACTIVITY: CPT | Performed by: THORACIC SURGERY (CARDIOTHORACIC VASCULAR SURGERY)

## 2022-07-31 PROCEDURE — 85520 HEPARIN ASSAY: CPT | Mod: 91 | Performed by: THORACIC SURGERY (CARDIOTHORACIC VASCULAR SURGERY)

## 2022-07-31 PROCEDURE — 83615 LACTATE (LD) (LDH) ENZYME: CPT | Performed by: STUDENT IN AN ORGANIZED HEALTH CARE EDUCATION/TRAINING PROGRAM

## 2022-07-31 PROCEDURE — A4217 STERILE WATER/SALINE, 500 ML: HCPCS

## 2022-07-31 PROCEDURE — 27100171 HC OXYGEN HIGH FLOW UP TO 24 HOURS

## 2022-07-31 PROCEDURE — 80053 COMPREHEN METABOLIC PANEL: CPT | Mod: 91 | Performed by: THORACIC SURGERY (CARDIOTHORACIC VASCULAR SURGERY)

## 2022-07-31 PROCEDURE — 99233 SBSQ HOSP IP/OBS HIGH 50: CPT | Mod: ,,, | Performed by: ANESTHESIOLOGY

## 2022-07-31 PROCEDURE — 93750 PR INTERROGATE VENT ASSIST DEV, IN PERSON, W PHYSICIAN ANALYSIS: ICD-10-PCS | Mod: ,,, | Performed by: INTERNAL MEDICINE

## 2022-07-31 PROCEDURE — 25000242 PHARM REV CODE 250 ALT 637 W/ HCPCS

## 2022-07-31 PROCEDURE — 63600175 PHARM REV CODE 636 W HCPCS: Performed by: STUDENT IN AN ORGANIZED HEALTH CARE EDUCATION/TRAINING PROGRAM

## 2022-07-31 PROCEDURE — 37799 UNLISTED PX VASCULAR SURGERY: CPT

## 2022-07-31 PROCEDURE — 83050 HGB METHEMOGLOBIN QUAN: CPT

## 2022-07-31 PROCEDURE — 83605 ASSAY OF LACTIC ACID: CPT

## 2022-07-31 PROCEDURE — 27000248 HC VAD-ADDITIONAL DAY

## 2022-07-31 PROCEDURE — 27000644 HC VENT IN-LINE ADAPTER

## 2022-07-31 PROCEDURE — 85730 THROMBOPLASTIN TIME PARTIAL: CPT | Performed by: THORACIC SURGERY (CARDIOTHORACIC VASCULAR SURGERY)

## 2022-07-31 PROCEDURE — 99900035 HC TECH TIME PER 15 MIN (STAT)

## 2022-07-31 PROCEDURE — 84100 ASSAY OF PHOSPHORUS: CPT | Performed by: THORACIC SURGERY (CARDIOTHORACIC VASCULAR SURGERY)

## 2022-07-31 PROCEDURE — 85025 COMPLETE CBC W/AUTO DIFF WBC: CPT | Performed by: THORACIC SURGERY (CARDIOTHORACIC VASCULAR SURGERY)

## 2022-07-31 PROCEDURE — 94761 N-INVAS EAR/PLS OXIMETRY MLT: CPT

## 2022-07-31 PROCEDURE — 27000221 HC OXYGEN, UP TO 24 HOURS

## 2022-07-31 PROCEDURE — 27200966 HC CLOSED SUCTION SYSTEM

## 2022-07-31 PROCEDURE — 99232 SBSQ HOSP IP/OBS MODERATE 35: CPT | Mod: ,,, | Performed by: INTERNAL MEDICINE

## 2022-07-31 PROCEDURE — 99900026 HC AIRWAY MAINTENANCE (STAT)

## 2022-07-31 PROCEDURE — 99232 PR SUBSEQUENT HOSPITAL CARE,LEVL II: ICD-10-PCS | Mod: ,,, | Performed by: INTERNAL MEDICINE

## 2022-07-31 PROCEDURE — 25000003 PHARM REV CODE 250: Performed by: PHYSICIAN ASSISTANT

## 2022-07-31 PROCEDURE — 99291 CRITICAL CARE FIRST HOUR: CPT | Mod: ,,, | Performed by: INTERNAL MEDICINE

## 2022-07-31 PROCEDURE — 84439 ASSAY OF FREE THYROXINE: CPT | Performed by: STUDENT IN AN ORGANIZED HEALTH CARE EDUCATION/TRAINING PROGRAM

## 2022-07-31 PROCEDURE — 94003 VENT MGMT INPAT SUBQ DAY: CPT

## 2022-07-31 RX ORDER — POTASSIUM CHLORIDE 29.8 MG/ML
40 INJECTION INTRAVENOUS ONCE
Status: COMPLETED | OUTPATIENT
Start: 2022-07-31 | End: 2022-07-31

## 2022-07-31 RX ORDER — MAGNESIUM SULFATE HEPTAHYDRATE 40 MG/ML
2 INJECTION, SOLUTION INTRAVENOUS ONCE
Status: COMPLETED | OUTPATIENT
Start: 2022-07-31 | End: 2022-07-31

## 2022-07-31 RX ADMIN — CHOLESTYRAMINE 4 G: 4 POWDER, FOR SUSPENSION ORAL at 05:07

## 2022-07-31 RX ADMIN — PREDNISONE 40 MG: 20 TABLET ORAL at 08:07

## 2022-07-31 RX ADMIN — DEXMEDETOMIDINE HYDROCHLORIDE 0.7 MCG/KG/HR: 4 INJECTION INTRAVENOUS at 09:07

## 2022-07-31 RX ADMIN — MEXILETINE HYDROCHLORIDE 400 MG: 200 CAPSULE ORAL at 01:07

## 2022-07-31 RX ADMIN — CHOLESTYRAMINE 4 G: 4 POWDER, FOR SUSPENSION ORAL at 12:07

## 2022-07-31 RX ADMIN — ACETAMINOPHEN 999.01 MG: 160 SOLUTION ORAL at 08:07

## 2022-07-31 RX ADMIN — METRONIDAZOLE 500 MG: 500 TABLET ORAL at 05:07

## 2022-07-31 RX ADMIN — METHIMAZOLE 20 MG: 10 TABLET ORAL at 08:07

## 2022-07-31 RX ADMIN — HYDROMORPHONE HYDROCHLORIDE 1 MG: 1 INJECTION, SOLUTION INTRAMUSCULAR; INTRAVENOUS; SUBCUTANEOUS at 01:07

## 2022-07-31 RX ADMIN — DEXMEDETOMIDINE HYDROCHLORIDE 0.6 MCG/KG/HR: 4 INJECTION INTRAVENOUS at 06:07

## 2022-07-31 RX ADMIN — LEVALBUTEROL HYDROCHLORIDE 0.63 MG: 0.63 SOLUTION RESPIRATORY (INHALATION) at 07:07

## 2022-07-31 RX ADMIN — Medication 0.04 MCG/KG/MIN: at 04:07

## 2022-07-31 RX ADMIN — ACETYLCYSTEINE 4 ML: 100 SOLUTION ORAL; RESPIRATORY (INHALATION) at 12:07

## 2022-07-31 RX ADMIN — ACETYLCYSTEINE 4 ML: 100 SOLUTION ORAL; RESPIRATORY (INHALATION) at 01:07

## 2022-07-31 RX ADMIN — DEXMEDETOMIDINE HYDROCHLORIDE 0.6 MCG/KG/HR: 4 INJECTION INTRAVENOUS at 12:07

## 2022-07-31 RX ADMIN — CEFEPIME 2 G: 2 INJECTION, POWDER, FOR SOLUTION INTRAVENOUS at 09:07

## 2022-07-31 RX ADMIN — POLYETHYLENE GLYCOL 3350 17 G: 17 POWDER, FOR SOLUTION ORAL at 08:07

## 2022-07-31 RX ADMIN — POTASSIUM PHOSPHATE, MONOBASIC AND POTASSIUM PHOSPHATE, DIBASIC 20 MMOL: 224; 236 INJECTION, SOLUTION, CONCENTRATE INTRAVENOUS at 05:07

## 2022-07-31 RX ADMIN — PANTOPRAZOLE SODIUM 40 MG: 40 INJECTION, POWDER, FOR SOLUTION INTRAVENOUS at 08:07

## 2022-07-31 RX ADMIN — CEFEPIME 2 G: 2 INJECTION, POWDER, FOR SOLUTION INTRAVENOUS at 08:07

## 2022-07-31 RX ADMIN — GUAIFENESIN 300 MG: 100 SOLUTION ORAL at 11:07

## 2022-07-31 RX ADMIN — METRONIDAZOLE 500 MG: 500 TABLET ORAL at 02:07

## 2022-07-31 RX ADMIN — GUAIFENESIN 300 MG: 100 SOLUTION ORAL at 05:07

## 2022-07-31 RX ADMIN — HYDROMORPHONE HYDROCHLORIDE 1 MG: 1 INJECTION, SOLUTION INTRAMUSCULAR; INTRAVENOUS; SUBCUTANEOUS at 08:07

## 2022-07-31 RX ADMIN — AMIODARONE HYDROCHLORIDE 400 MG: 200 TABLET ORAL at 03:07

## 2022-07-31 RX ADMIN — ASCORBIC ACID, VITAMIN A PALMITATE, CHOLECALCIFEROL, THIAMINE HYDROCHLORIDE, RIBOFLAVIN-5 PHOSPHATE SODIUM, PYRIDOXINE HYDROCHLORIDE, NIACINAMIDE, DEXPANTHENOL, ALPHA-TOCOPHEROL ACETATE, VITAMIN K1, FOLIC ACID, BIOTIN, CYANOCOBALAMIN: 200; 3300; 200; 6; 3.6; 6; 40; 15; 10; 150; 600; 60; 5 INJECTION, SOLUTION INTRAVENOUS at 09:07

## 2022-07-31 RX ADMIN — GUAIFENESIN 300 MG: 100 SOLUTION ORAL at 12:07

## 2022-07-31 RX ADMIN — AMIODARONE HYDROCHLORIDE 400 MG: 200 TABLET ORAL at 08:07

## 2022-07-31 RX ADMIN — MIDODRINE HYDROCHLORIDE 15 MG: 5 TABLET ORAL at 09:07

## 2022-07-31 RX ADMIN — MIDODRINE HYDROCHLORIDE 15 MG: 5 TABLET ORAL at 01:07

## 2022-07-31 RX ADMIN — CHOLESTYRAMINE 4 G: 4 POWDER, FOR SUSPENSION ORAL at 09:07

## 2022-07-31 RX ADMIN — CHOLESTYRAMINE 4 G: 4 POWDER, FOR SUSPENSION ORAL at 08:07

## 2022-07-31 RX ADMIN — MEXILETINE HYDROCHLORIDE 400 MG: 200 CAPSULE ORAL at 09:07

## 2022-07-31 RX ADMIN — MEXILETINE HYDROCHLORIDE 400 MG: 200 CAPSULE ORAL at 05:07

## 2022-07-31 RX ADMIN — ACETYLCYSTEINE 4 ML: 100 SOLUTION ORAL; RESPIRATORY (INHALATION) at 08:07

## 2022-07-31 RX ADMIN — LEVALBUTEROL HYDROCHLORIDE 0.63 MG: 0.63 SOLUTION RESPIRATORY (INHALATION) at 12:07

## 2022-07-31 RX ADMIN — AMIODARONE HYDROCHLORIDE 0.5 MG/MIN: 1.8 INJECTION, SOLUTION INTRAVENOUS at 10:07

## 2022-07-31 RX ADMIN — MAGNESIUM SULFATE 2 G: 2 INJECTION INTRAVENOUS at 09:07

## 2022-07-31 RX ADMIN — LEVALBUTEROL HYDROCHLORIDE 0.63 MG: 0.63 SOLUTION RESPIRATORY (INHALATION) at 08:07

## 2022-07-31 RX ADMIN — AMIODARONE HYDROCHLORIDE 1 MG/MIN: 1.8 INJECTION, SOLUTION INTRAVENOUS at 08:07

## 2022-07-31 RX ADMIN — HEPARIN SODIUM 2000 UNITS/HR: 5000 INJECTION INTRAVENOUS; SUBCUTANEOUS at 12:07

## 2022-07-31 RX ADMIN — POTASSIUM CHLORIDE 40 MEQ: 29.8 INJECTION, SOLUTION INTRAVENOUS at 12:07

## 2022-07-31 RX ADMIN — MIDODRINE HYDROCHLORIDE 15 MG: 5 TABLET ORAL at 05:07

## 2022-07-31 RX ADMIN — METRONIDAZOLE 500 MG: 500 TABLET ORAL at 09:07

## 2022-07-31 RX ADMIN — LEVALBUTEROL HYDROCHLORIDE 0.63 MG: 0.63 SOLUTION RESPIRATORY (INHALATION) at 01:07

## 2022-07-31 RX ADMIN — ACETYLCYSTEINE 4 ML: 100 SOLUTION ORAL; RESPIRATORY (INHALATION) at 07:07

## 2022-07-31 RX ADMIN — ASPIRIN 81 MG CHEWABLE TABLET 81 MG: 81 TABLET CHEWABLE at 08:07

## 2022-07-31 RX ADMIN — POTASSIUM CHLORIDE 40 MEQ: 29.8 INJECTION, SOLUTION INTRAVENOUS at 05:07

## 2022-07-31 NOTE — ASSESSMENT & PLAN NOTE
LVAD in place, continues to have runs of VT on multiple antiarrhythmics. Also cardioverted from AFL to SR on 7/30  Management by primary

## 2022-07-31 NOTE — SUBJECTIVE & OBJECTIVE
"Interval HPI:   Overnight events: Pt went into AFL yesterday and was cardioverted to SR. Remains critically ill on vasopressors and intubated. fT4 is now down-trending. T3 low this AM. BG 140s-180s, although not documented q4h.  Eating:   NPO  Nausea: No  Hypoglycemia and intervention: No  Fever: Yes  TPN and/or TF: Yes  If yes, type of TF/TPN and rate: Peptamen 1.5 with prebio; rate currently at 45 mL/hr    BP (!) 72/0 (BP Location: Right arm, Patient Position: Lying)   Pulse (!) 59   Temp 98.24 °F (36.8 °C)   Resp (!) 24   Ht 6' 1" (1.854 m)   Wt 98.9 kg (218 lb)   SpO2 100%   BMI 28.76 kg/m²     Labs Reviewed and Include    Recent Labs   Lab 07/31/22 0402   *   CALCIUM 9.1   ALBUMIN 1.9*   PROT 6.5      K 4.0   CO2 20*      BUN 83*   CREATININE 2.5*   ALKPHOS 93   ALT 31   AST 34   BILITOT 6.3*     Lab Results   Component Value Date    WBC 15.56 (H) 07/31/2022    HGB 7.2 (L) 07/31/2022    HCT 23.7 (L) 07/31/2022    MCV 92 07/31/2022     07/31/2022     Recent Labs   Lab 07/27/22  0348 07/29/22  0218 07/30/22  0415 07/31/22  0402   TSH <0.010*  --   --   --    FREET4 2.45*   < > 3.25* 3.02*    < > = values in this interval not displayed.     Lab Results   Component Value Date    HGBA1C 5.4 04/26/2021       Nutritional status:   Body mass index is 28.76 kg/m².  Lab Results   Component Value Date    ALBUMIN 1.9 (L) 07/31/2022    ALBUMIN 2.0 (L) 07/30/2022    ALBUMIN 2.1 (L) 07/30/2022     Lab Results   Component Value Date    PREALBUMIN 13 (L) 07/29/2022    PREALBUMIN 11 (L) 07/17/2022    PREALBUMIN 10 (L) 07/15/2022       Estimated Creatinine Clearance: 41.3 mL/min (A) (based on SCr of 2.5 mg/dL (H)).    Accu-Checks  Recent Labs     07/31/22  0823   POCTGLUCOSE 150*       Current Medications and/or Treatments Impacting Glycemic Control  Immunotherapy:    Immunosuppressants       None          Steroids:   Hormones (From admission, onward)                Start     Stop Route Frequency " Ordered    07/28/22 1545  predniSONE tablet 40 mg         -- PER NG TUBE Daily 07/28/22 1542    07/15/22 0900  vasopressin (PITRESSIN) 1 Units/mL in dextrose 5 % 100 mL infusion         -- IV Continuous 07/15/22 0900          Pressors:    Autonomic Drugs (From admission, onward)                Start     Stop Route Frequency Ordered    07/29/22 0011  EPINEPHrine (ADRENALIN) 1 mg/mL injection        Note to Pharmacy: Created by cabinet override    07/29 1214   07/29/22 0011    07/29/22 0007  EPINEPHrine 5 mg in dextrose 5% 250 mL infusion (premix)        Question Answer Comment   Begin at (in mcg/kg/min): 0.01    Titrate by: (in mcg/kg/min) 0.01    Titrate interval: (in minutes) 10    Titrate to maintain: (SBP or MAP or Cardiac Index) MAP    Greater than: (in mmHg) 65    Maximum dose: (in mcg/kg/min) 2        -- IV Continuous 07/29/22 0008    07/23/22 1400  midodrine tablet 15 mg         -- PER NG TUBE Every 8 hours 07/23/22 0944    07/15/22 0900  NORepinephrine 8 mg in dextrose 5% 250 mL infusion        Question Answer Comment   Begin at (in mcg/kg/min): 0.1    Titrate by: (in mcg/kg/min) 0.02    Titrate interval: (in minutes) 20    Titrate to maintain: (MAP or SBP) MAP    Greater than: (in mmHg) 65    Maximum dose: (in mcg/kg/min) 0.2        -- IV Continuous 07/15/22 0900          Hyperglycemia/Diabetes Medications:   Antihyperglycemics (From admission, onward)                Start     Stop Route Frequency Ordered    07/30/22 1023  insulin aspart U-100 pen 0-5 Units         -- SubQ Every 4 hours PRN 07/30/22 0925

## 2022-07-31 NOTE — NURSING
ANNITA Shrestha MD notified of pt's recent ABG w/lactic, lab values, current drip rates, and VAD numbers. PI's now ~2.5-2.9. Electrolyte replacements ordered. Heparin gtt rate to remain the same for now per MD.

## 2022-07-31 NOTE — ASSESSMENT & PLAN NOTE
55 yo male with PMH of HFrEF with an EF of 10% s/p HM2 admitted on 6/27 with acute respiratory failure secondary to COVID complicated with LVAD pump thrombosis s/p LVAD pump exchange with HM3 on 7/13. Post-op course complicated with worsening cardiogenic shock/RV failure requiring VA-ECMO. On 7/30, EP reconsulted for concerns of wide complex tachyarrhythmia.    Upon tele review, appeared to be in 2:1 atrial flutter with rate of 150. Hemodynamically stable but lower PI reads on VAD.     Recommendations   - Returned to sinus rhythm after synchronized cardioversion yesterday. Remains in sinus rhythm this morning.   - can decrease amiodarone to 0.5 mg/min. Recommend remaining on amiodarone infusion until extubated and off pressors   - can discontinue oral amiodarone   - can discontinue lidocaine infusion given this was an atrial arrhythmia   - will likely be difficult to keep in sinus rhythm given his critical illness   - minimize pressors as tolerated   - keep mag>2, K>4

## 2022-07-31 NOTE — SUBJECTIVE & OBJECTIVE
Interval History/Significant Events: Tachycardia yesterday to the 180s with decreased Pis. Lidocaine and amio drip restarted. Required cardioversion and is now back in NSR. Hemodynamically stable this morning on epi 0.04 and vaso 0.04.     Follow-up For: Procedure(s) (LRB):  REMOVAL, FOREIGN BODY (N/A)    Post-Operative Day: 9 Days Post-Op    Objective:     Vital Signs (Most Recent):  Temp: 98.24 °F (36.8 °C) (07/31/22 0945)  Pulse: (!) 58 (07/31/22 0945)  Resp: (!) 24 (07/31/22 0905)  BP: (!) 72/0 (07/31/22 0300)  SpO2: 100 % (07/31/22 0350)   Vital Signs (24h Range):  Temp:  [97.7 °F (36.5 °C)-101.66 °F (38.7 °C)] 98.24 °F (36.8 °C)  Pulse:  [] 58  Resp:  [18-26] 24  SpO2:  [97 %-100 %] 100 %  BP: (70-78)/(0) 72/0  Arterial Line BP: (67-83)/(47-72) 76/66     Weight: 98.9 kg (218 lb)  Body mass index is 28.76 kg/m².      Intake/Output Summary (Last 24 hours) at 7/31/2022 1005  Last data filed at 7/31/2022 0900  Gross per 24 hour   Intake 4305.04 ml   Output 5200 ml   Net -894.96 ml       Physical Exam  Constitutional:       Comments: Intubated and sedated   HENT:      Head: Normocephalic and atraumatic.      Nose:      Comments: NG in place  Eyes:      Pupils: Pupils are equal, round, and reactive to light.   Neck:      Comments: R IJ CVC    Cardiovascular:      Rate and Rhythm: Normal rate. Rhythm irregular.      Comments: LVAD in place -- 6400 rpm, 5.0L    Midline sternotomy c/d with dressing in place      Pulmonary:      Comments: Intubated      Abdominal:      General: There is no distension.      Palpations: Abdomen is soft.      Comments: Prior driveline site packed with iodoform gauze   Genitourinary:     Comments: Lagunas in place draining clear urine  Musculoskeletal:      Comments: ECMO cannula sites with dressing in place.     Cutdown incision with seroma with wound vac in place, serous output. Changed yesterday    right radial arterial line   Skin:     General: Skin is warm and dry.   Neurological:       Comments: Sedated       Vents:  Vent Mode: A/C (07/31/22 0905)  Ventilator Initiated: Yes (07/29/22 0002)  Set Rate: 24 BPM (07/31/22 0905)  Vt Set: 550 mL (07/31/22 0905)  Pressure Support: 8 cmH20 (07/29/22 0002)  PEEP/CPAP: 10 cmH20 (07/31/22 0905)  Oxygen Concentration (%): 60 (07/31/22 0945)  Peak Airway Pressure: 34 cmH2O (07/31/22 0905)  Plateau Pressure: 30 cmH20 (07/31/22 0905)  Total Ve: 13.5 mL (07/31/22 0905)  Negative Inspiratory Force (cm H2O): 0 (07/31/22 0905)  F/VT Ratio<105 (RSBI): (!) 42.86 (07/31/22 0905)    Lines/Drains/Airways       Central Venous Catheter Line  Duration             Percutaneous Central Line Insertion/Assessment - Quad Lumen  07/21/22 1515 right internal jugular 9 days              Drain  Duration                  NG/OG Tube 07/15/22 1030 Left nostril 15 days         Urethral Catheter 07/27/22 1536 Temperature probe 16 Fr. 3 days              Airway  Duration                  Airway - Non-Surgical 07/29/22 0042 2 days              Arterial Line  Duration             Arterial Line 07/30/22 0900 Right Radial 1 day              Line  Duration                  VAD 07/13/22 1300 Left ventricular assist device HeartMate 3 17 days              Peripheral Intravenous Line  Duration                  Midline Catheter Insertion/Assessment  - Single Lumen 07/21/22 1616 Left basilic vein (medial side of arm) 18g x 10cm 9 days         Peripheral IV - Single Lumen 07/30/22 0648 22 G Posterior;Right Hand 1 day                    Significant Labs:    CBC/Anemia Profile:  Recent Labs   Lab 07/30/22  1058 07/30/22 1934 07/31/22  0402   WBC 17.45* 16.55* 15.56*   HGB 7.4* 7.5* 7.2*   HCT 24.4* 24.8* 23.7*    434 398   MCV 96 95 92   RDW 21.8* 21.9* 21.9*        Chemistries:  Recent Labs   Lab 07/30/22  1058 07/30/22 1934 07/31/22  0402    140 139   K 4.2 3.7 4.0    107 107   CO2 21* 20* 20*   BUN 91* 87* 83*   CREATININE 2.9* 2.5* 2.5*   CALCIUM 9.3 8.9 9.1   ALBUMIN 2.1*  2.0* 1.9*   PROT 6.8 6.7 6.5   BILITOT 7.0* 6.3* 6.3*   ALKPHOS 103 95 93   ALT 35 32 31   AST 46* 38 34   MG 2.6 2.4 2.5   PHOS 3.8 2.6* 2.5*       ABGs:   Recent Labs   Lab 07/31/22  0408   PH 7.391   PCO2 35.0   HCO3 21.3*   POCSATURATED 100   BE -4     Coagulation:   Recent Labs   Lab 07/31/22  0402   INR 1.4*   APTT 43.5*     All pertinent labs within the past 24 hours have been reviewed.    Significant Imaging:  I have reviewed all pertinent imaging results/findings within the past 24 hours.

## 2022-07-31 NOTE — PROGRESS NOTES
07/31/2022  Fitz Christianson    Current provider:  Yg Kaufman MD    Device interrogation:  TXP LVAD INTERROGATIONS 7/31/2022 7/31/2022 7/31/2022 7/31/2022 7/31/2022 7/31/2022 7/31/2022   Type HeartMate3 HeartMate3 HeartMate3 HeartMate3 HeartMate3 HeartMate3 HeartMate3   Flow 5.3 5.5 5.3 5.3 5.3 5.4 5.5   Speed 6200 6200 6200 6200 6200 6200 6200   PI 3.2 3.3 3.3 3.3 3.1 3.2 3.1   Power ([place]) 5.2 5.1 5.1 5.2 5.1 5.1 5.1   LSL 5800 - - - 5800 - -   Low Flow Alarm - - - - - - -   Pulsatility Intermittent pulse - - - Intermittent pulse - -          Rounded on Tim Richards to ensure all mechanical assist device settings (IABP or VAD) were appropriate and all parameters were within limits.  I was able to ensure all back up equipment was present, the staff had no issues, and the Perfusion Department daily rounding was complete.      For implantable VADs: Interrogation of Ventricular assist device was performed with analysis of device parameters and review of device function. I have personally reviewed the interrogation findings and agree with findings as stated.     In emergency, the nursing units have been notified to contact the perfusion department either by:  Calling [phone] from 630am to 4pm Mon thru Fri, utilizing the On-Call Finder functionality of Epic and searching for Perfusion, or by contacting the hospital  from 4pm to 630am and on weekends and asking to speak with the perfusionist on call.    3:18 PM

## 2022-07-31 NOTE — SUBJECTIVE & OBJECTIVE
Interval History: Returned to sinus rhythm after synchronized cardioversion yesterday. Remains in sinus rhythm this morning.     Review of Systems   Unable to perform ROS: Intubated   Objective:     Vital Signs (Most Recent):  Temp: 98.42 °F (36.9 °C) (07/31/22 1045)  Pulse: (!) 59 (07/31/22 1045)  Resp: (!) 24 (07/31/22 0905)  BP: (!) 72/0 (07/31/22 0300)  SpO2: 100 % (07/31/22 0350)   Vital Signs (24h Range):  Temp:  [97.7 °F (36.5 °C)-101.66 °F (38.7 °C)] 98.42 °F (36.9 °C)  Pulse:  [] 59  Resp:  [18-26] 24  SpO2:  [97 %-100 %] 100 %  BP: (70-78)/(0) 72/0  Arterial Line BP: (70-83)/(47-72) 77/65     Weight: 98.9 kg (218 lb)  Body mass index is 28.76 kg/m².     SpO2: 100 %  O2 Device (Oxygen Therapy): ventilator    Physical Exam  Constitutional:       General: He is not in acute distress.     Appearance: He is ill-appearing.   HENT:      Head: Normocephalic and atraumatic.      Nose: No congestion.      Mouth/Throat:      Mouth: Mucous membranes are moist.   Eyes:      Extraocular Movements: Extraocular movements intact.      Conjunctiva/sclera: Conjunctivae normal.   Cardiovascular:      Rate and Rhythm: Normal rate and regular rhythm.      Comments: Vad hum present   Pulmonary:      Effort: Pulmonary effort is normal. No respiratory distress.      Comments: Intubated  Abdominal:      General: Abdomen is flat.   Musculoskeletal:      Cervical back: Normal range of motion.      Right lower leg: No edema.      Left lower leg: No edema.   Skin:     General: Skin is warm.      Capillary Refill: Capillary refill takes less than 2 seconds.      Findings: No rash.   Neurological:      Mental Status: He is alert.      Comments: Sedated but easily arousable        Significant Labs: CMP:   Recent Labs   Lab 07/30/22  1058 07/30/22  1934 07/31/22  0402    140 139   K 4.2 3.7 4.0    107 107   CO2 21* 20* 20*   * 170* 183*   BUN 91* 87* 83*   CREATININE 2.9* 2.5* 2.5*   CALCIUM 9.3 8.9 9.1   PROT 6.8  6.7 6.5   ALBUMIN 2.1* 2.0* 1.9*   BILITOT 7.0* 6.3* 6.3*   ALKPHOS 103 95 93   AST 46* 38 34   ALT 35 32 31   ANIONGAP 12 13 12   ESTGFRAFRICA 26.9* 32.2* 32.2*   EGFRNONAA 23.3* 27.9* 27.9*   , CBC:   Recent Labs   Lab 07/30/22  1058 07/30/22  1934 07/31/22  0402   WBC 17.45* 16.55* 15.56*   HGB 7.4* 7.5* 7.2*   HCT 24.4* 24.8* 23.7*    434 398   , and All pertinent lab results from the last 24 hours have been reviewed.

## 2022-07-31 NOTE — PROGRESS NOTES
[facility]  Cardiac Electrophysiology  Progress Note    Admission Date: 6/27/2022  Code Status: Full Code   Attending Physician: Yg Kaufman MD   Expected Discharge Date: 8/15/2022  Principal Problem:Left ventricular assist device (LVAD) complication    Subjective:     Interval History: Returned to sinus rhythm after synchronized cardioversion yesterday. Remains in sinus rhythm this morning.     Review of Systems   Unable to perform ROS: Intubated   Objective:     Vital Signs (Most Recent):  Temp: 98.42 °F (36.9 °C) (07/31/22 1045)  Pulse: (!) 59 (07/31/22 1045)  Resp: (!) 24 (07/31/22 0905)  BP: (!) 72/0 (07/31/22 0300)  SpO2: 100 % (07/31/22 0350)   Vital Signs (24h Range):  Temp:  [97.7 °F (36.5 °C)-101.66 °F (38.7 °C)] 98.42 °F (36.9 °C)  Pulse:  [] 59  Resp:  [18-26] 24  SpO2:  [97 %-100 %] 100 %  BP: (70-78)/(0) 72/0  Arterial Line BP: (70-83)/(47-72) 77/65     Weight: 98.9 kg (218 lb)  Body mass index is 28.76 kg/m².     SpO2: 100 %  O2 Device (Oxygen Therapy): ventilator    Physical Exam  Constitutional:       General: He is not in acute distress.     Appearance: He is ill-appearing.   HENT:      Head: Normocephalic and atraumatic.      Nose: No congestion.      Mouth/Throat:      Mouth: Mucous membranes are moist.   Eyes:      Extraocular Movements: Extraocular movements intact.      Conjunctiva/sclera: Conjunctivae normal.   Cardiovascular:      Rate and Rhythm: Normal rate and regular rhythm.      Comments: Vad hum present   Pulmonary:      Effort: Pulmonary effort is normal. No respiratory distress.      Comments: Intubated  Abdominal:      General: Abdomen is flat.   Musculoskeletal:      Cervical back: Normal range of motion.      Right lower leg: No edema.      Left lower leg: No edema.   Skin:     General: Skin is warm.      Capillary Refill: Capillary refill takes less than 2 seconds.      Findings: No rash.   Neurological:      Mental Status: He is alert.       Comments: Sedated but easily arousable        Significant Labs: CMP:   Recent Labs   Lab 07/30/22  1058 07/30/22  1934 07/31/22  0402    140 139   K 4.2 3.7 4.0    107 107   CO2 21* 20* 20*   * 170* 183*   BUN 91* 87* 83*   CREATININE 2.9* 2.5* 2.5*   CALCIUM 9.3 8.9 9.1   PROT 6.8 6.7 6.5   ALBUMIN 2.1* 2.0* 1.9*   BILITOT 7.0* 6.3* 6.3*   ALKPHOS 103 95 93   AST 46* 38 34   ALT 35 32 31   ANIONGAP 12 13 12   ESTGFRAFRICA 26.9* 32.2* 32.2*   EGFRNONAA 23.3* 27.9* 27.9*   , CBC:   Recent Labs   Lab 07/30/22  1058 07/30/22  1934 07/31/22  0402   WBC 17.45* 16.55* 15.56*   HGB 7.4* 7.5* 7.2*   HCT 24.4* 24.8* 23.7*    434 398   , and All pertinent lab results from the last 24 hours have been reviewed.        Assessment and Plan:     Atrial flutter  57 yo male with PMH of HFrEF with an EF of 10% s/p HM2 admitted on 6/27 with acute respiratory failure secondary to COVID complicated with LVAD pump thrombosis s/p LVAD pump exchange with HM3 on 7/13. Post-op course complicated with worsening cardiogenic shock/RV failure requiring VA-ECMO. On 7/30, EP reconsulted for concerns of wide complex tachyarrhythmia.    Upon tele review, appeared to be in 2:1 atrial flutter with rate of 150. Hemodynamically stable but lower PI reads on VAD.     Recommendations   - Returned to sinus rhythm after synchronized cardioversion yesterday. Remains in sinus rhythm this morning.   - can decrease amiodarone to 0.5 mg/min. Recommend remaining on amiodarone infusion until extubated and off pressors   - can discontinue oral amiodarone   - can discontinue lidocaine infusion given this was an atrial arrhythmia   - will likely be difficult to keep in sinus rhythm given his critical illness, minimize pressors as tolerated   - keep mag>2, K>4        Will sign off at this time. Please feel free to reach out with any questions or concerns     Taco Jeronimo MD  Cardiac Electrophysiology  [facility]

## 2022-07-31 NOTE — ASSESSMENT & PLAN NOTE
- History of AIT type 2 (TRAb and TSI negative; Thyroid US unremarkable with low vascularity)  - Weaned off methimazole and prednisone by May 2020, then developed hypothyroidism, LT4 started and dose titrated per TFTs. Prior to current admission he was on LT4 112 mcg daily  - Labs consistent with hypothyroidism until current admission, initially on 7/13, with low TSH and elevated fT4. Repeat TFTs on 7/27 with worsening hyperthyroidism. He continued to receive LT4 112 mcg through 7/28. He remains on amiodarone for episodes of VT  - Suspect current hyperthyroidism is AIT, however continued exogenous LT4 certainly contribute to current presentation   - Free T4 continues to decrease    Lab Results   Component Value Date    TSH <0.010 (L) 07/27/2022    TSH 0.111 (L) 07/13/2022    TSH 4.079 (H) 03/17/2022    FREET4 2.71 (H) 08/01/2022    FREET4 3.02 (H) 07/31/2022    FREET4 3.25 (H) 07/30/2022     Recommendations:  - Although mechanism unknown, strongly suspect AIT (type 1 or 2); continuing empiric treatment for both  - Continue Methimazole 20mg BID  - Continue Prednisone 40mg qd  - Continue Cholestyramine 4g QID for now  - Check daily free T4 and total T3 for now

## 2022-07-31 NOTE — ASSESSMENT & PLAN NOTE
Tim Richards is a 56 y.o. male HFrEF with an EF of 10% and a history of HM2 LVAD in 2018 who is now s/p HM3 upgrade, initiation of V-A ECMO after failure to wean off bypass x2      Neuro/Psych:   -- Sedation: precedex, propofol   -- Pain: PRN Dilaudid             Cards:   -- S/P LVAD exchange on 7/14/22  --Improving pressor requirements, wean gwen for MAP > 75, keep other pressors the same   Epi 0.04   Levo off   Vaso 0.04   Giapreza off  -- Continue Midodrine 15 mg Q8  -- Stress dose steroids complete  -- MAP goal 75  -- VAD  flows stable  -- Decannulated on 7/19  -- Sternal closure on 7/15  -- Bedside drive line removal on 7/23  -- EP consult due to v-tach  -- Restarted lidocaine, restarted amio ggt, restarted mexiletine      Pulm:   -- Goal O2 sat > 90%  -- ABG PRN  -- Chest tubes removed  -- Nitric weaned at 20  -- Extubated 7/26, re intubated 7/28  -- Monitor O2 sat with ABGs  -- Bronch 7/29 without significant findings,7/30 with mucus plug removed      Renal:  -- Keep sun for strict I/O  -- Trend BUN/Cr  -- Lasix gtt 2.5/hr        FEN / GI:   -- Replace lytes as needed  -- Nutrition: NPO,TPN  -- GI ppx: PPI  -- Bowel reg: miralax, docusate, mag citrate      ID:   -- Tm: febrile; WBC increasing   -- ABX cefepime, falgyl  -- Cultures sent, repeat cultures sent 7/22, no growth to date, repeat cultures sent 7/26 --> gram negative rods on sputum cultures  -- BAL GNR --> pansensitive klebsiella   -- Repeat sputum cultures --> pansensitive klebsiella  -- ID consult   -- daily vanc levels  -- Line exchange 7/21  -- UA with many bacteria and WBCs, culture pending  -- CT without obvious source of infection, lungs with large amounts of atlectasis  -- Covid test negative      Heme/Onc:   -- H/H stable   -- Daily CBC  -- Received 18 pRBCs, 16 FFP, 2 plt, 25 cryo; 1500 albumin intra-op  -- Heparin gtt at 2000, do not titrate        Endo:   -- BG goal 140-180  -- SSI  -- Levothyroxine discontinued  --  hyperthyroid despite being hypothyroid at home  -- Endocrine consulted  -- Started on prednisone and methimazole       PPx:   Feeding: NPO, TPN  Analgesia/Sedation: Precedex, propofol / PRN Dilaudid  Thromboembolic prevention: SCDs, heparin gtt  HOB >30: Yes  Stress Ulcer ppx: PPI  Glucose control: Critical care goal 140-180 g/dl, ISS     Lines/Drains/Airway: NG; RIJ CVC, r-radial a-line, sun; WV, VAD; midline      Dispo/Code Status/Palliative:   -- SICU / Full Code.

## 2022-07-31 NOTE — PROGRESS NOTES
07/30/2022  Fitz Christianson    Current provider:  Yg Kaufman MD    Device interrogation:  TXP LVAD INTERROGATIONS 7/30/2022 7/30/2022 7/30/2022 7/30/2022 7/30/2022 7/30/2022 7/30/2022   Type HeartMate3 HeartMate3 HeartMate3 HeartMate3 HeartMate3 HeartMate3 HeartMate3   Flow 5.3 5.4 5.3 5.4 4.9 5 5.1   Speed 6200 6200 6200 6200 5800 5800 5600   PI 3.6 3.4 3.3 3.3 3.5 3.6 1.4   Power (Jackson) 5.1 5.1 5.1 5.1 4.4 4.4 4.1   LSL 5800 5800 5800 5800 - 5400 -   Low Flow Alarm - - - - - - -   Pulsatility Intermittent pulse Intermittent pulse Intermittent pulse Intermittent pulse - - -          Rounded on Tim Richards to ensure all mechanical assist device settings (IABP or VAD) were appropriate and all parameters were within limits.  I was able to ensure all back up equipment was present, the staff had no issues, and the Perfusion Department daily rounding was complete.      For implantable VADs: Interrogation of Ventricular assist device was performed with analysis of device parameters and review of device function. I have personally reviewed the interrogation findings and agree with findings as stated.     In emergency, the nursing units have been notified to contact the perfusion department either by:  Calling [phone] from 630am to 4pm Mon thru Fri, utilizing the On-Call Finder functionality of Epic and searching for Perfusion, or by contacting the hospital  from 4pm to 630am and on weekends and asking to speak with the perfusionist on call.    10:37 PM

## 2022-07-31 NOTE — PROGRESS NOTES
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 Underwent LVAD pump exchange. Post-op course complicated by worsening cardiogenic shock/RV failure requiring VA-ECMO. Improved clinically underwent successful decannulation. Continued episodes of VT with ICD shock 7/21 and ongoing anti-arrhythmic mediation adjustments. TFTs on 7/27 significant for undetectable TSH and elevated fT4.     Endocrinology was consulted for hyperthyroidism.       With regards to amiodarone induced hypothyroidism:  Last seen outpatient by Dr Piedra on 3/29/2022.    He initially developed amiodarone-induced hyperthyroidism (Type 2 AIT) in 7/2019. He required a prolonged course of PDN and MMI, then subsequently developed hypothyroidism in 5/2020. LT4 was adjusted based on serial TFT measurements. Most recently levothyroxine dose has been 112 mcg.     He self-decreased his amiodarone dose to 200 mg daily about 6 months ago. T4 was high on 7/13, but he was continued on levothyroxine 112 mcg.          Interval HPI:   Overnight events: Pt went into AFL yesterday and was cardioverted to SR. Remains critically ill on vasopressors and intubated. fT4 is now down-trending. T3 low this AM. BG 140s-180s, although not documented q4h.  Eating:   NPO  Nausea: No  Hypoglycemia and intervention: No  Fever: Yes  TPN and/or TF: Yes  If yes, type of TF/TPN and rate: Peptamen 1.5 with prebio; rate currently at 45 mL/hr    BP (!) 72/0 (BP Location: Right arm, Patient Position: Lying)   Pulse (!) 59   Temp 98.24 °F (36.8 °C)   Resp (!) 24   Ht 6' 1" (1.854 m)   Wt 98.9 kg (218 lb)   SpO2 100%   BMI 28.76 kg/m²     Labs Reviewed and Include  "   Recent Labs   Lab 07/31/22  0402   *   CALCIUM 9.1   ALBUMIN 1.9*   PROT 6.5      K 4.0   CO2 20*      BUN 83*   CREATININE 2.5*   ALKPHOS 93   ALT 31   AST 34   BILITOT 6.3*     Lab Results   Component Value Date    WBC 15.56 (H) 07/31/2022    HGB 7.2 (L) 07/31/2022    HCT 23.7 (L) 07/31/2022    MCV 92 07/31/2022     07/31/2022     Recent Labs   Lab 07/27/22  0348 07/29/22  0218 07/30/22  0415 07/31/22  0402   TSH <0.010*  --   --   --    FREET4 2.45*   < > 3.25* 3.02*    < > = values in this interval not displayed.     Lab Results   Component Value Date    HGBA1C 5.4 04/26/2021       Nutritional status:   Body mass index is 28.76 kg/m².  Lab Results   Component Value Date    ALBUMIN 1.9 (L) 07/31/2022    ALBUMIN 2.0 (L) 07/30/2022    ALBUMIN 2.1 (L) 07/30/2022     Lab Results   Component Value Date    PREALBUMIN 13 (L) 07/29/2022    PREALBUMIN 11 (L) 07/17/2022    PREALBUMIN 10 (L) 07/15/2022       Estimated Creatinine Clearance: 41.3 mL/min (A) (based on SCr of 2.5 mg/dL (H)).    Accu-Checks  Recent Labs     07/31/22  0823   POCTGLUCOSE 150*       Current Medications and/or Treatments Impacting Glycemic Control  Immunotherapy:    Immunosuppressants       None          Steroids:   Hormones (From admission, onward)                Start     Stop Route Frequency Ordered    07/28/22 1545  predniSONE tablet 40 mg         -- PER NG TUBE Daily 07/28/22 1542    07/15/22 0900  vasopressin (PITRESSIN) 1 Units/mL in dextrose 5 % 100 mL infusion         -- IV Continuous 07/15/22 0900          Pressors:    Autonomic Drugs (From admission, onward)                Start     Stop Route Frequency Ordered    07/29/22 0011  EPINEPHrine (ADRENALIN) 1 mg/mL injection        Note to Pharmacy: Created by cabinet override    07/29 1214   07/29/22 0011    07/29/22 0007  EPINEPHrine 5 mg in dextrose 5% 250 mL infusion (premix)        Question Answer Comment   Begin at (in mcg/kg/min): 0.01    Titrate by: (in  mcg/kg/min) 0.01    Titrate interval: (in minutes) 10    Titrate to maintain: (SBP or MAP or Cardiac Index) MAP    Greater than: (in mmHg) 65    Maximum dose: (in mcg/kg/min) 2        -- IV Continuous 07/29/22 0008    07/23/22 1400  midodrine tablet 15 mg         -- PER NG TUBE Every 8 hours 07/23/22 0944    07/15/22 0900  NORepinephrine 8 mg in dextrose 5% 250 mL infusion        Question Answer Comment   Begin at (in mcg/kg/min): 0.1    Titrate by: (in mcg/kg/min) 0.02    Titrate interval: (in minutes) 20    Titrate to maintain: (MAP or SBP) MAP    Greater than: (in mmHg) 65    Maximum dose: (in mcg/kg/min) 0.2        -- IV Continuous 07/15/22 0900          Hyperglycemia/Diabetes Medications:   Antihyperglycemics (From admission, onward)                Start     Stop Route Frequency Ordered    07/30/22 1023  insulin aspart U-100 pen 0-5 Units         -- SubQ Every 4 hours PRN 07/30/22 0925            ASSESSMENT and PLAN    Amiodarone-induced hyperthyroidism  -Pt with hx of Amiodarone induced hyperthyroidism type 2, then developed Hypothyroidism.  - TRAb and TSI - negative. US unremarkable with low vascularity.   - After previous presentation of AIT, he was weaned off methimazole and prednisone by 5/2020  - Then developed hypothyroidism, LT4 started and dose titrated per TFTs    - Prior to current admission he was on LT4 112 mcg qd  - Labs c/w hypothyroidism until current admission, initially on 7/13, with low TSH and elevated fT4. Repeat TFTs on 7/27 with worsening hyperthyroidism. He continued to receive LT4 112 mcg through 7/28.  - He remains on amiodarone for episodes of VT  - Suspect current hyperthyroidism is AIT, however continued exogenous LT4 exacerbated/contributed to current presentation     Lab Results   Component Value Date    TSH <0.010 (L) 07/27/2022    M2XKIBJ 47 (L) 07/31/2022    H6VWLFQ 9.5 11/11/2021    FREET4 3.02 (H) 07/31/2022       Recommendations:  - Although mechanism unknown, strongly  suspect AIT (type 1 or 2) -- will start treatment for AIT type 2 empirically due to clinical implications in delayed treatment.   - fT4 now down-trending with low T3, remains critically ill  - Methimazole 20mg BID and Prednisone 40mg qd  - Added Cholestyramine 4g QID  - Daily fT4 and total T3  - We discontinued levothyroxine    amiodarone induced hypothyrodism  After previous AIT he becme hypothyroid and required thyroid replacement   Levothyroxine discontinued on 7/28/2022    VT (ventricular tachycardia)  Recurrent VT requiring multiple antiarrhythmics, including continued, long-term need for amiodarone       Hyperglycemia  Hyperglycemia noted once starting TF in addition to steroids, no hx of DM  Accuchecks q4h with low dose correction for continuous enteral feeding  Please ensure that accuchecks are being documented q4h per order  Will add scheduled aspart if requiring correction regularly    - Will watch trend and adjust insulin as needed  - Please notify Endocrine if the tube feeds are stopped or restarted for any reason as insulin requirements will change    LVAD (left ventricular assist device) present  LVAD in place, continues to have runs of VT on multiple antiarrhythmics. Also cardioverted from AFL to SR on 7/30  Management by primary      Hypertensive cardiovascular-renal disease, stage 1-4 or unspecified chronic kidney disease, with heart failure  Careful insulin titration with impaired renal function      Chronic combined systolic and diastolic heart failure  Managed per primary        María Brice MD  Endocrinology  [facility]

## 2022-07-31 NOTE — ASSESSMENT & PLAN NOTE
-Pt with hx of Amiodarone induced hyperthyroidism type 2, then developed Hypothyroidism.  - TRAb and TSI - negative. US unremarkable with low vascularity.   - After previous presentation of AIT, he was weaned off methimazole and prednisone by 5/2020  - Then developed hypothyroidism, LT4 started and dose titrated per TFTs    - Prior to current admission he was on LT4 112 mcg qd  - Labs c/w hypothyroidism until current admission, initially on 7/13, with low TSH and elevated fT4. Repeat TFTs on 7/27 with worsening hyperthyroidism. He continued to receive LT4 112 mcg through 7/28.  - He remains on amiodarone for episodes of VT  - Suspect current hyperthyroidism is AIT, however continued exogenous LT4 exacerbated/contributed to current presentation     Lab Results   Component Value Date    TSH <0.010 (L) 07/27/2022    T4NFBTE 47 (L) 07/31/2022    E4FOKSM 9.5 11/11/2021    FREET4 3.02 (H) 07/31/2022       Recommendations:  - Although mechanism unknown, strongly suspect AIT (type 1 or 2) -- will start treatment for AIT type 2 empirically due to clinical implications in delayed treatment.   - fT4 now down-trending with low T3, remains critically ill  - Methimazole 20mg BID and Prednisone 40mg qd  - Added Cholestyramine 4g QID  - Daily fT4 and total T3  - We discontinued levothyroxine

## 2022-07-31 NOTE — NURSING
Notified ANNITA Shrestha MD of recent lab results, including ABG w/lactic and antiXa. Electrolyte replacement orders received. Instructed to leave heparin gtt at current rate.

## 2022-07-31 NOTE — ASSESSMENT & PLAN NOTE
Key History and Diagnostic Findings  - Hyperglycemia noted once starting tube feeds in addition to steroids  - No prior history of diabetes; most recent A1c of 5.4 on 04/26/2021    Plan  - Continue fingerstick checks q4h with low correction for continuous enteral feeding  - Please ensure that accuchecks are being documented q4h per order  - Will add scheduled aspart if requiring correction regularly

## 2022-07-31 NOTE — PROGRESS NOTES
[facility]  Critical Care - Surgery  Progress Note    Patient Name: Tim Richards  MRN: [id number]  Admission Date: 6/27/2022  Hospital Length of Stay: 34 days  Code Status: Full Code  Attending Provider: Yg Kaufman MD  Primary Care Provider: Deyanira Booth MD   Principal Problem: Left ventricular assist device (LVAD) complication    Subjective:     Hospital/ICU Course:  No notes on file    Interval History/Significant Events: Tachycardia yesterday to the 180s with decreased Pis. Lidocaine and amio drip restarted. Required cardioversion and is now back in NSR. Hemodynamically stable this morning on epi 0.04 and vaso 0.04.     Follow-up For: Procedure(s) (LRB):  REMOVAL, FOREIGN BODY (N/A)    Post-Operative Day: 9 Days Post-Op    Objective:     Vital Signs (Most Recent):  Temp: 98.24 °F (36.8 °C) (07/31/22 0945)  Pulse: (!) 58 (07/31/22 0945)  Resp: (!) 24 (07/31/22 0905)  BP: (!) 72/0 (07/31/22 0300)  SpO2: 100 % (07/31/22 0350)   Vital Signs (24h Range):  Temp:  [97.7 °F (36.5 °C)-101.66 °F (38.7 °C)] 98.24 °F (36.8 °C)  Pulse:  [] 58  Resp:  [18-26] 24  SpO2:  [97 %-100 %] 100 %  BP: (70-78)/(0) 72/0  Arterial Line BP: (67-83)/(47-72) 76/66     Weight: 98.9 kg (218 lb)  Body mass index is 28.76 kg/m².      Intake/Output Summary (Last 24 hours) at 7/31/2022 1005  Last data filed at 7/31/2022 0900  Gross per 24 hour   Intake 4305.04 ml   Output 5200 ml   Net -894.96 ml       Physical Exam  Constitutional:       Comments: Intubated and sedated   HENT:      Head: Normocephalic and atraumatic.      Nose:      Comments: NG in place  Eyes:      Pupils: Pupils are equal, round, and reactive to light.   Neck:      Comments: R IJ CVC    Cardiovascular:      Rate and Rhythm: Normal rate. Rhythm irregular.      Comments: LVAD in place -- 6400 rpm, 5.0L    Midline sternotomy c/d with dressing in place      Pulmonary:      Comments: Intubated      Abdominal:      General: There is no  distension.      Palpations: Abdomen is soft.      Comments: Prior driveline site packed with iodoform gauze   Genitourinary:     Comments: Lagunas in place draining clear urine  Musculoskeletal:      Comments: ECMO cannula sites with dressing in place.     Cutdown incision with seroma with wound vac in place, serous output. Changed yesterday    right radial arterial line   Skin:     General: Skin is warm and dry.   Neurological:      Comments: Sedated       Vents:  Vent Mode: A/C (07/31/22 0905)  Ventilator Initiated: Yes (07/29/22 0002)  Set Rate: 24 BPM (07/31/22 0905)  Vt Set: 550 mL (07/31/22 0905)  Pressure Support: 8 cmH20 (07/29/22 0002)  PEEP/CPAP: 10 cmH20 (07/31/22 0905)  Oxygen Concentration (%): 60 (07/31/22 0945)  Peak Airway Pressure: 34 cmH2O (07/31/22 0905)  Plateau Pressure: 30 cmH20 (07/31/22 0905)  Total Ve: 13.5 mL (07/31/22 0905)  Negative Inspiratory Force (cm H2O): 0 (07/31/22 0905)  F/VT Ratio<105 (RSBI): (!) 42.86 (07/31/22 0905)    Lines/Drains/Airways       Central Venous Catheter Line  Duration             Percutaneous Central Line Insertion/Assessment - Quad Lumen  07/21/22 1515 right internal jugular 9 days              Drain  Duration                  NG/OG Tube 07/15/22 1030 Left nostril 15 days         Urethral Catheter 07/27/22 1536 Temperature probe 16 Fr. 3 days              Airway  Duration                  Airway - Non-Surgical 07/29/22 0042 2 days              Arterial Line  Duration             Arterial Line 07/30/22 0900 Right Radial 1 day              Line  Duration                  VAD 07/13/22 1300 Left ventricular assist device HeartMate 3 17 days              Peripheral Intravenous Line  Duration                  Midline Catheter Insertion/Assessment  - Single Lumen 07/21/22 1616 Left basilic vein (medial side of arm) 18g x 10cm 9 days         Peripheral IV - Single Lumen 07/30/22 0648 22 G Posterior;Right Hand 1 day                    Significant Labs:    CBC/Anemia  Profile:  Recent Labs   Lab 07/30/22  1058 07/30/22 1934 07/31/22  0402   WBC 17.45* 16.55* 15.56*   HGB 7.4* 7.5* 7.2*   HCT 24.4* 24.8* 23.7*    434 398   MCV 96 95 92   RDW 21.8* 21.9* 21.9*        Chemistries:  Recent Labs   Lab 07/30/22  1058 07/30/22 1934 07/31/22  0402    140 139   K 4.2 3.7 4.0    107 107   CO2 21* 20* 20*   BUN 91* 87* 83*   CREATININE 2.9* 2.5* 2.5*   CALCIUM 9.3 8.9 9.1   ALBUMIN 2.1* 2.0* 1.9*   PROT 6.8 6.7 6.5   BILITOT 7.0* 6.3* 6.3*   ALKPHOS 103 95 93   ALT 35 32 31   AST 46* 38 34   MG 2.6 2.4 2.5   PHOS 3.8 2.6* 2.5*       ABGs:   Recent Labs   Lab 07/31/22  0408   PH 7.391   PCO2 35.0   HCO3 21.3*   POCSATURATED 100   BE -4     Coagulation:   Recent Labs   Lab 07/31/22  0402   INR 1.4*   APTT 43.5*     All pertinent labs within the past 24 hours have been reviewed.    Significant Imaging:  I have reviewed all pertinent imaging results/findings within the past 24 hours.    Assessment/Plan:     Chronic combined systolic and diastolic heart failure  Tim Richards is a 56 y.o. male HFrEF with an EF of 10% and a history of HM2 LVAD in 2018 who is now s/p HM3 upgrade, initiation of V-A ECMO after failure to wean off bypass x2      Neuro/Psych:   -- Sedation: precedex, propofol   -- Pain: PRN Dilaudid             Cards:   -- S/P LVAD exchange on 7/14/22  --Improving pressor requirements, wean gwen for MAP > 75, keep other pressors the same   Epi 0.04   Levo off   Vaso 0.04   Giapreza off  -- Continue Midodrine 15 mg Q8  -- Stress dose steroids complete  -- MAP goal 75  -- VAD  flows stable  -- Decannulated on 7/19  -- Sternal closure on 7/15  -- Bedside drive line removal on 7/23  -- EP consult due to v-tach  -- Restarted lidocaine, restarted amio ggt, restarted mexiletine      Pulm:   -- Goal O2 sat > 90%  -- ABG PRN  -- Chest tubes removed  -- Nitric weaned at 20  -- Extubated 7/26, re intubated 7/28  -- Monitor O2 sat with ABGs  -- Bronch 7/29 without  significant findings,7/30 with mucus plug removed      Renal:  -- Keep sun for strict I/O  -- Trend BUN/Cr  -- Lasix gtt 2.5/hr        FEN / GI:   -- Replace lytes as needed  -- Nutrition: NPO,TPN  -- GI ppx: PPI  -- Bowel reg: miralax, docusate, mag citrate      ID:   -- Tm: febrile; WBC increasing   -- ABX cefepime, falgyl  -- Cultures sent, repeat cultures sent 7/22, no growth to date, repeat cultures sent 7/26 --> gram negative rods on sputum cultures  -- BAL GNR --> pansensitive klebsiella   -- Repeat sputum cultures --> pansensitive klebsiella  -- ID consult   -- daily vanc levels  -- Line exchange 7/21  -- UA with many bacteria and WBCs, culture pending  -- CT without obvious source of infection, lungs with large amounts of atlectasis  -- Covid test negative      Heme/Onc:   -- H/H stable   -- Daily CBC  -- Received 18 pRBCs, 16 FFP, 2 plt, 25 cryo; 1500 albumin intra-op  -- Heparin gtt at 2000, do not titrate        Endo:   -- BG goal 140-180  -- SSI  -- Levothyroxine discontinued  -- hyperthyroid despite being hypothyroid at home  -- Endocrine consulted  -- Started on prednisone and methimazole       PPx:   Feeding: NPO, TPN  Analgesia/Sedation: Precedex, propofol / PRN Dilaudid  Thromboembolic prevention: SCDs, heparin gtt  HOB >30: Yes  Stress Ulcer ppx: PPI  Glucose control: Critical care goal 140-180 g/dl, ISS     Lines/Drains/Airway: NG; RIJ CVC, r-radial a-line, sun; WV, VAD; midline      Dispo/Code Status/Palliative:   -- SICU / Full Code.         Dang Amezcua MD  Critical Care - Surgery  [facility]

## 2022-07-31 NOTE — PROGRESS NOTES
[facility]  Heart Failure  Progress Note     Hospital Length of Stay: 34    Interval History:  -Returned to NSR following DCCV yesterday, in sinus rhythm this morning    Vitals:  Temp:  [97.7 °F (36.5 °C)-101.66 °F (38.7 °C)] 99.5 °F (37.5 °C)  Pulse:  [] 63  Resp:  [16-25] 25  SpO2:  [97 %-100 %] 100 %  BP: (70-78)/(0) 72/0  Arterial Line BP: (70-83)/(47-72) 74/64    Intake/Output    Intake/Output Summary (Last 24 hours) at 7/31/2022 1356  Last data filed at 7/31/2022 1300  Gross per 24 hour   Intake 4810.86 ml   Output 5425 ml   Net -614.14 ml       Physical Exam  Gen: Intubated and sedated  HEENT: Pupils equal and reactive to light  Cardio: Regular rate, point of maximal impulse not displaced,1+ radial pulses bilaterally, 1+ DP pulses bilaterally, VAD hum obstructing heart sounds  Resp: crackles, rhonchi  Abd: Soft, non-tender, non-distended  : CLEO drainage  Skin: Warm, dry, peripheral edema  Neuro: intubated and sedated  Psych: unable to assess     Labs:  Recent Labs   Lab 07/30/22 1934 07/31/22  0402 07/31/22  1153   WBC 16.55* 15.56* 15.94*   HGB 7.5* 7.2* 7.4*   HCT 24.8* 23.7* 23.7*    398 401     Recent Labs   Lab 07/30/22 1934 07/31/22  0402 07/31/22  1153    139 139   K 3.7 4.0 4.8    107 106   CO2 20* 20* 21*   BUN 87* 83* 84*   CREATININE 2.5* 2.5* 2.3*   * 183* 194*   CALCIUM 8.9 9.1 9.2   MG 2.4 2.5 2.2   PHOS 2.6* 2.5* 3.9       Micro:    Current Meds:   acetylcysteine 100 mg/ml (10%)  4 mL Nebulization Q6H    amiodarone  400 mg Oral TID    aspirin  81 mg Per OG tube Daily    calcium gluconate IVPB  1 g Intravenous Once    ceFEPime (MAXIPIME) IVPB  2 g Intravenous Q12H    cholestyramine  1 packet Per NG tube QID    guaiFENesin 100 mg/5 ml  300 mg Per NG tube Q6H    levalbuterol  0.63 mg Nebulization Q6H    methIMAzole  20 mg Per NG tube BID    metroNIDAZOLE  500 mg Oral Q8H    mexiletine  400 mg Oral Q8H    midodrine  15 mg Per NG  tube Q8H    pantoprazole  40 mg Intravenous BID    polyethylene glycol  17 g Per NG tube Daily    predniSONE  40 mg Per NG tube Daily       Continuous Infusions:   amiodarone in dextrose 5% 1 mg/min (07/31/22 1300)    dexmedetomidine (PRECEDEX) infusion 0.7 mcg/kg/hr (07/31/22 1300)    dextrose 10 % in water (D10W)      EPINEPHrine 0.04 mcg/kg/min (07/31/22 1300)    furosemide (LASIX) 2 mg/mL continuous infusion (non-titrating) 2.5 mg/hr (07/31/22 1300)    heparin (porcine) in 5 % dex 2,000 Units/hr (07/31/22 1300)    LIDOcaine 1 mg/min (07/31/22 1300)    nitric oxide gas      NORepinephrine bitartrate-D5W Stopped (07/30/22 2302)    propofoL Stopped (07/30/22 1027)    TPN ADULT CENTRAL LINE CUSTOM 45 mL/hr at 07/31/22 1300    TPN ADULT CENTRAL LINE CUSTOM      vasopressin 0.04 Units/min (07/31/22 1300)         Assessment and Plan:    Cardiogenic Shock  -Previous HM2, underwent pump exchange to HM3 on 7/13/22 complicated by worsening CS/RV failure requiring VA ECMO now decannulated  -Re-intubated on 7/29/22 due to hypercapnic resp failure  -Currently on Epi 0.04, vaso 0.04  -CTS primary    LVAD  TXP LVAD INTERROGATIONS 7/31/2022 7/31/2022 7/31/2022 7/31/2022 7/31/2022 7/31/2022 7/31/2022   Type HeartMate3 HeartMate3 HeartMate3 HeartMate3 HeartMate3 HeartMate3 HeartMate3   Flow 5.3 5.5 5.3 5.3 5.3 5.4 5.5   Speed 6200 6200 6200 6200 6200 6200 6200   PI 3.2 3.3 3.3 3.3 3.1 3.2 3.1   Power (Jackson) 5.2 5.1 5.1 5.2 5.1 5.1 5.1   LSL 5800 - - - 5800 - -   Low Flow Alarm - - - - - - -   Pulsatility Intermittent pulse - - - Intermittent pulse - -         Anxiety  -Currently intubated and sedated     History of ventricular tachycardia/Episode of A flutter 2:1 block  Patient experienced an episode of VT on 6/30 and experienced an ICD shock thought to be related to hypokalemia (K of 3.1 on 6/30)  - Aggressively replete K, keep K>4 and Mg>2  - Continue mexiletine PO, lidocaine gtt, and amiodarone gtt   - EP  consulted, appreciate their recommendations     COVID-19 virus infection  -Previously hypoxic then recovered  -ID was consulted, recommended Remdesivir, course completed     Elevated serum lactate dehydrogenase (LDH)  S/p HM3 exchange for HM2 pump thrombosis  Trend LDH daily     amiodarone induced hypothyrodism  -Continue levothyroxine 112 mcg      Leukocytosis  Patient with leukocytosis (25K), previous fevers of unknown source.  Getting CT scan to look for abdominal or pulmonary source of sepsis    Chronic combined systolic and diastolic heart failure  ADHF  Underwent HM III upgrade on 7/13/22, currently on VA ECMO  Currently on Epi gtt, Vasopressin  Currently on Precedex and propofol gtt for sedation  Being treated for sepsis of unknown origen.   Currently intubated and sedated (reintubarted 7/29)  Chest tube removal, bronch, and old driveline removed 7/22    Nathen Lowry MD  [facility] PGY-5      Staff attestation to follow.    This note was prepared using voice recognition system and may have sound alike errors that may have been overlooked even with proof reading.

## 2022-08-01 LAB
ALBUMIN SERPL BCP-MCNC: 1.9 G/DL (ref 3.5–5.2)
ALBUMIN SERPL BCP-MCNC: 2 G/DL (ref 3.5–5.2)
ALBUMIN SERPL BCP-MCNC: 2 G/DL (ref 3.5–5.2)
ALLENS TEST: ABNORMAL
ALLENS TEST: ABNORMAL
ALLENS TEST: NORMAL
ALP SERPL-CCNC: 89 U/L (ref 55–135)
ALP SERPL-CCNC: 89 U/L (ref 55–135)
ALP SERPL-CCNC: 92 U/L (ref 55–135)
ALT SERPL W/O P-5'-P-CCNC: 29 U/L (ref 10–44)
ALT SERPL W/O P-5'-P-CCNC: 30 U/L (ref 10–44)
ALT SERPL W/O P-5'-P-CCNC: 30 U/L (ref 10–44)
ANION GAP SERPL CALC-SCNC: 11 MMOL/L (ref 8–16)
ANION GAP SERPL CALC-SCNC: 11 MMOL/L (ref 8–16)
ANION GAP SERPL CALC-SCNC: 12 MMOL/L (ref 8–16)
APTT BLDCRRT: 35.4 SEC (ref 21–32)
AST SERPL-CCNC: 31 U/L (ref 10–40)
AST SERPL-CCNC: 34 U/L (ref 10–40)
AST SERPL-CCNC: 36 U/L (ref 10–40)
BACTERIA SPEC AEROBE CULT: NO GROWTH
BASOPHILS # BLD AUTO: 0.01 K/UL (ref 0–0.2)
BASOPHILS NFR BLD: 0.1 % (ref 0–1.9)
BILIRUB SERPL-MCNC: 6.2 MG/DL (ref 0.1–1)
BILIRUB SERPL-MCNC: 6.3 MG/DL (ref 0.1–1)
BILIRUB SERPL-MCNC: 6.4 MG/DL (ref 0.1–1)
BNP SERPL-MCNC: 2395 PG/ML (ref 0–99)
BUN SERPL-MCNC: 76 MG/DL (ref 6–20)
BUN SERPL-MCNC: 79 MG/DL (ref 6–20)
BUN SERPL-MCNC: 85 MG/DL (ref 6–20)
CALCIUM SERPL-MCNC: 8.6 MG/DL (ref 8.7–10.5)
CALCIUM SERPL-MCNC: 9.4 MG/DL (ref 8.7–10.5)
CALCIUM SERPL-MCNC: 9.4 MG/DL (ref 8.7–10.5)
CHLORIDE SERPL-SCNC: 105 MMOL/L (ref 95–110)
CHLORIDE SERPL-SCNC: 107 MMOL/L (ref 95–110)
CHLORIDE SERPL-SCNC: 108 MMOL/L (ref 95–110)
CO2 SERPL-SCNC: 22 MMOL/L (ref 23–29)
CREAT SERPL-MCNC: 1.8 MG/DL (ref 0.5–1.4)
CREAT SERPL-MCNC: 1.9 MG/DL (ref 0.5–1.4)
CREAT SERPL-MCNC: 2.1 MG/DL (ref 0.5–1.4)
CRP SERPL-MCNC: 79.6 MG/L (ref 0–8.2)
DELSYS: ABNORMAL
DIFFERENTIAL METHOD: ABNORMAL
EOSINOPHIL # BLD AUTO: 0 K/UL (ref 0–0.5)
EOSINOPHIL NFR BLD: 0 % (ref 0–8)
EOSINOPHIL NFR BLD: 0.1 % (ref 0–8)
EOSINOPHIL NFR BLD: 0.1 % (ref 0–8)
ERYTHROCYTE [DISTWIDTH] IN BLOOD BY AUTOMATED COUNT: 21 % (ref 11.5–14.5)
ERYTHROCYTE [DISTWIDTH] IN BLOOD BY AUTOMATED COUNT: 21.1 % (ref 11.5–14.5)
ERYTHROCYTE [DISTWIDTH] IN BLOOD BY AUTOMATED COUNT: 21.2 % (ref 11.5–14.5)
ERYTHROCYTE [SEDIMENTATION RATE] IN BLOOD BY WESTERGREN METHOD: 24 MM/H
EST. GFR  (NO RACE VARIABLE): 36.5 ML/MIN/1.73 M^2
EST. GFR  (NO RACE VARIABLE): 41.1 ML/MIN/1.73 M^2
EST. GFR  (NO RACE VARIABLE): 43.9 ML/MIN/1.73 M^2
FACT X PPP CHRO-ACNC: 0.17 IU/ML (ref 0.3–0.7)
FACT X PPP CHRO-ACNC: 0.23 IU/ML (ref 0.3–0.7)
FACT X PPP CHRO-ACNC: 0.25 IU/ML (ref 0.3–0.7)
FACT X PPP CHRO-ACNC: 0.33 IU/ML (ref 0.3–0.7)
FIBRINOGEN PPP-MCNC: 772 MG/DL (ref 182–400)
FIO2: 60
GLUCOSE SERPL-MCNC: 166 MG/DL (ref 70–110)
GLUCOSE SERPL-MCNC: 189 MG/DL (ref 70–110)
GLUCOSE SERPL-MCNC: 230 MG/DL (ref 70–110)
GRAM STN SPEC: NORMAL
HCO3 UR-SCNC: 23.1 MMOL/L (ref 24–28)
HCO3 UR-SCNC: 24.1 MMOL/L (ref 24–28)
HCT VFR BLD AUTO: 23.6 % (ref 40–54)
HCT VFR BLD AUTO: 23.6 % (ref 40–54)
HCT VFR BLD AUTO: 23.8 % (ref 40–54)
HCT VFR BLD CALC: 25 %PCV (ref 36–54)
HGB BLD-MCNC: 7.2 G/DL (ref 14–18)
HGB BLD-MCNC: 7.2 G/DL (ref 14–18)
HGB BLD-MCNC: 7.3 G/DL (ref 14–18)
IMM GRANULOCYTES # BLD AUTO: 0.15 K/UL (ref 0–0.04)
IMM GRANULOCYTES # BLD AUTO: 0.16 K/UL (ref 0–0.04)
IMM GRANULOCYTES # BLD AUTO: 0.18 K/UL (ref 0–0.04)
IMM GRANULOCYTES NFR BLD AUTO: 0.9 % (ref 0–0.5)
IMM GRANULOCYTES NFR BLD AUTO: 1 % (ref 0–0.5)
IMM GRANULOCYTES NFR BLD AUTO: 1.2 % (ref 0–0.5)
INR PPP: 1.3 (ref 0.8–1.2)
INR PPP: 1.3 (ref 0.8–1.2)
INR PPP: 1.4 (ref 0.8–1.2)
LDH SERPL L TO P-CCNC: 1.09 MMOL/L (ref 0.36–1.25)
LDH SERPL L TO P-CCNC: 235 U/L (ref 110–260)
LYMPHOCYTES # BLD AUTO: 0.2 K/UL (ref 1–4.8)
LYMPHOCYTES # BLD AUTO: 0.3 K/UL (ref 1–4.8)
LYMPHOCYTES # BLD AUTO: 0.6 K/UL (ref 1–4.8)
LYMPHOCYTES NFR BLD: 1.4 % (ref 18–48)
LYMPHOCYTES NFR BLD: 1.6 % (ref 18–48)
LYMPHOCYTES NFR BLD: 3.8 % (ref 18–48)
MAGNESIUM SERPL-MCNC: 2.1 MG/DL (ref 1.6–2.6)
MAGNESIUM SERPL-MCNC: 2.2 MG/DL (ref 1.6–2.6)
MAGNESIUM SERPL-MCNC: 2.5 MG/DL (ref 1.6–2.6)
MCH RBC QN AUTO: 28.1 PG (ref 27–31)
MCH RBC QN AUTO: 28.6 PG (ref 27–31)
MCH RBC QN AUTO: 29 PG (ref 27–31)
MCHC RBC AUTO-ENTMCNC: 30.3 G/DL (ref 32–36)
MCHC RBC AUTO-ENTMCNC: 30.5 G/DL (ref 32–36)
MCHC RBC AUTO-ENTMCNC: 30.9 G/DL (ref 32–36)
MCV RBC AUTO: 91 FL (ref 82–98)
MCV RBC AUTO: 94 FL (ref 82–98)
MCV RBC AUTO: 95 FL (ref 82–98)
METHEMOGLOBIN: 0.9 % (ref 0–3)
MODE: ABNORMAL
MONOCYTES # BLD AUTO: 1 K/UL (ref 0.3–1)
MONOCYTES # BLD AUTO: 1.1 K/UL (ref 0.3–1)
MONOCYTES # BLD AUTO: 1.5 K/UL (ref 0.3–1)
MONOCYTES NFR BLD: 6.4 % (ref 4–15)
MONOCYTES NFR BLD: 6.7 % (ref 4–15)
MONOCYTES NFR BLD: 9.6 % (ref 4–15)
NEUTROPHILS # BLD AUTO: 12.8 K/UL (ref 1.8–7.7)
NEUTROPHILS # BLD AUTO: 13.8 K/UL (ref 1.8–7.7)
NEUTROPHILS # BLD AUTO: 15.4 K/UL (ref 1.8–7.7)
NEUTROPHILS NFR BLD: 85.2 % (ref 38–73)
NEUTROPHILS NFR BLD: 90.5 % (ref 38–73)
NEUTROPHILS NFR BLD: 91.2 % (ref 38–73)
NRBC BLD-RTO: 1 /100 WBC
PCO2 BLDA: 36.4 MMHG (ref 35–45)
PCO2 BLDA: 38.3 MMHG (ref 35–45)
PEEP: 8
PH SMN: 7.41 [PH] (ref 7.35–7.45)
PH SMN: 7.41 [PH] (ref 7.35–7.45)
PHOSPHATE SERPL-MCNC: 3 MG/DL (ref 2.7–4.5)
PHOSPHATE SERPL-MCNC: 3.1 MG/DL (ref 2.7–4.5)
PHOSPHATE SERPL-MCNC: 3.3 MG/DL (ref 2.7–4.5)
PLATELET # BLD AUTO: 354 K/UL (ref 150–450)
PLATELET # BLD AUTO: 359 K/UL (ref 150–450)
PLATELET # BLD AUTO: 389 K/UL (ref 150–450)
PMV BLD AUTO: 11.1 FL (ref 9.2–12.9)
PMV BLD AUTO: 11.3 FL (ref 9.2–12.9)
PMV BLD AUTO: 11.4 FL (ref 9.2–12.9)
PO2 BLDA: 229 MMHG (ref 80–100)
PO2 BLDA: 234 MMHG (ref 80–100)
POC BE: -1 MMOL/L
POC BE: -2 MMOL/L
POC IONIZED CALCIUM: 1.23 MMOL/L (ref 1.06–1.42)
POC SATURATED O2: 100 % (ref 95–100)
POC SATURATED O2: 100 % (ref 95–100)
POC TCO2: 24 MMOL/L (ref 23–27)
POC TCO2: 25 MMOL/L (ref 23–27)
POCT GLUCOSE: 138 MG/DL (ref 70–110)
POCT GLUCOSE: 159 MG/DL (ref 70–110)
POCT GLUCOSE: 165 MG/DL (ref 70–110)
POCT GLUCOSE: 195 MG/DL (ref 70–110)
POCT GLUCOSE: 201 MG/DL (ref 70–110)
POCT GLUCOSE: 215 MG/DL (ref 70–110)
POCT GLUCOSE: 218 MG/DL (ref 70–110)
POTASSIUM BLD-SCNC: 4 MMOL/L (ref 3.5–5.1)
POTASSIUM SERPL-SCNC: 3.9 MMOL/L (ref 3.5–5.1)
POTASSIUM SERPL-SCNC: 4.1 MMOL/L (ref 3.5–5.1)
POTASSIUM SERPL-SCNC: 4.6 MMOL/L (ref 3.5–5.1)
PROT SERPL-MCNC: 6.1 G/DL (ref 6–8.4)
PROT SERPL-MCNC: 6.9 G/DL (ref 6–8.4)
PROT SERPL-MCNC: 6.9 G/DL (ref 6–8.4)
PROTHROMBIN TIME: 13.5 SEC (ref 9–12.5)
PROTHROMBIN TIME: 13.5 SEC (ref 9–12.5)
PROTHROMBIN TIME: 13.8 SEC (ref 9–12.5)
RBC # BLD AUTO: 2.48 M/UL (ref 4.6–6.2)
RBC # BLD AUTO: 2.52 M/UL (ref 4.6–6.2)
RBC # BLD AUTO: 2.6 M/UL (ref 4.6–6.2)
SAMPLE: ABNORMAL
SAMPLE: ABNORMAL
SAMPLE: NORMAL
SITE: ABNORMAL
SITE: ABNORMAL
SITE: NORMAL
SODIUM BLD-SCNC: 141 MMOL/L (ref 136–145)
SODIUM SERPL-SCNC: 139 MMOL/L (ref 136–145)
SODIUM SERPL-SCNC: 140 MMOL/L (ref 136–145)
SODIUM SERPL-SCNC: 141 MMOL/L (ref 136–145)
T3 SERPL-MCNC: 49 NG/DL (ref 60–180)
T4 FREE SERPL-MCNC: 2.71 NG/DL (ref 0.71–1.51)
VT: 550
WBC # BLD AUTO: 15.04 K/UL (ref 3.9–12.7)
WBC # BLD AUTO: 15.22 K/UL (ref 3.9–12.7)
WBC # BLD AUTO: 16.93 K/UL (ref 3.9–12.7)

## 2022-08-01 PROCEDURE — 85025 COMPLETE CBC W/AUTO DIFF WBC: CPT | Mod: 91 | Performed by: THORACIC SURGERY (CARDIOTHORACIC VASCULAR SURGERY)

## 2022-08-01 PROCEDURE — 99900026 HC AIRWAY MAINTENANCE (STAT)

## 2022-08-01 PROCEDURE — 99232 PR SUBSEQUENT HOSPITAL CARE,LEVL II: ICD-10-PCS | Mod: ,,, | Performed by: INTERNAL MEDICINE

## 2022-08-01 PROCEDURE — 85610 PROTHROMBIN TIME: CPT | Mod: 91 | Performed by: THORACIC SURGERY (CARDIOTHORACIC VASCULAR SURGERY)

## 2022-08-01 PROCEDURE — 25000003 PHARM REV CODE 250

## 2022-08-01 PROCEDURE — 27000248 HC VAD-ADDITIONAL DAY

## 2022-08-01 PROCEDURE — 63600175 PHARM REV CODE 636 W HCPCS: Performed by: STUDENT IN AN ORGANIZED HEALTH CARE EDUCATION/TRAINING PROGRAM

## 2022-08-01 PROCEDURE — 93750 PR INTERROGATE VENT ASSIST DEV, IN PERSON, W PHYSICIAN ANALYSIS: ICD-10-PCS | Mod: ,,, | Performed by: INTERNAL MEDICINE

## 2022-08-01 PROCEDURE — 25000003 PHARM REV CODE 250: Performed by: THORACIC SURGERY (CARDIOTHORACIC VASCULAR SURGERY)

## 2022-08-01 PROCEDURE — C9113 INJ PANTOPRAZOLE SODIUM, VIA: HCPCS

## 2022-08-01 PROCEDURE — 99291 PR CRITICAL CARE, E/M 30-74 MINUTES: ICD-10-PCS | Mod: ,,, | Performed by: ANESTHESIOLOGY

## 2022-08-01 PROCEDURE — 85520 HEPARIN ASSAY: CPT | Mod: 91 | Performed by: THORACIC SURGERY (CARDIOTHORACIC VASCULAR SURGERY)

## 2022-08-01 PROCEDURE — 83050 HGB METHEMOGLOBIN QUAN: CPT

## 2022-08-01 PROCEDURE — 25000242 PHARM REV CODE 250 ALT 637 W/ HCPCS

## 2022-08-01 PROCEDURE — 63600175 PHARM REV CODE 636 W HCPCS: Performed by: THORACIC SURGERY (CARDIOTHORACIC VASCULAR SURGERY)

## 2022-08-01 PROCEDURE — B4185 PARENTERAL SOL 10 GM LIPIDS: HCPCS

## 2022-08-01 PROCEDURE — 84480 ASSAY TRIIODOTHYRONINE (T3): CPT | Performed by: STUDENT IN AN ORGANIZED HEALTH CARE EDUCATION/TRAINING PROGRAM

## 2022-08-01 PROCEDURE — A4217 STERILE WATER/SALINE, 500 ML: HCPCS

## 2022-08-01 PROCEDURE — 25000003 PHARM REV CODE 250: Performed by: STUDENT IN AN ORGANIZED HEALTH CARE EDUCATION/TRAINING PROGRAM

## 2022-08-01 PROCEDURE — 83880 ASSAY OF NATRIURETIC PEPTIDE: CPT | Performed by: STUDENT IN AN ORGANIZED HEALTH CARE EDUCATION/TRAINING PROGRAM

## 2022-08-01 PROCEDURE — 85384 FIBRINOGEN ACTIVITY: CPT | Performed by: THORACIC SURGERY (CARDIOTHORACIC VASCULAR SURGERY)

## 2022-08-01 PROCEDURE — 37799 UNLISTED PX VASCULAR SURGERY: CPT

## 2022-08-01 PROCEDURE — 63600367 HC NITRIC OXIDE PER HOUR

## 2022-08-01 PROCEDURE — 83615 LACTATE (LD) (LDH) ENZYME: CPT | Performed by: STUDENT IN AN ORGANIZED HEALTH CARE EDUCATION/TRAINING PROGRAM

## 2022-08-01 PROCEDURE — 99900035 HC TECH TIME PER 15 MIN (STAT)

## 2022-08-01 PROCEDURE — 82330 ASSAY OF CALCIUM: CPT

## 2022-08-01 PROCEDURE — 85014 HEMATOCRIT: CPT

## 2022-08-01 PROCEDURE — 99232 SBSQ HOSP IP/OBS MODERATE 35: CPT | Mod: ,,, | Performed by: INTERNAL MEDICINE

## 2022-08-01 PROCEDURE — 84295 ASSAY OF SERUM SODIUM: CPT

## 2022-08-01 PROCEDURE — 27100171 HC OXYGEN HIGH FLOW UP TO 24 HOURS

## 2022-08-01 PROCEDURE — 63600175 PHARM REV CODE 636 W HCPCS

## 2022-08-01 PROCEDURE — 27000644 HC VENT IN-LINE ADAPTER

## 2022-08-01 PROCEDURE — 94761 N-INVAS EAR/PLS OXIMETRY MLT: CPT

## 2022-08-01 PROCEDURE — 84132 ASSAY OF SERUM POTASSIUM: CPT

## 2022-08-01 PROCEDURE — 84100 ASSAY OF PHOSPHORUS: CPT | Performed by: THORACIC SURGERY (CARDIOTHORACIC VASCULAR SURGERY)

## 2022-08-01 PROCEDURE — 82800 BLOOD PH: CPT

## 2022-08-01 PROCEDURE — 85730 THROMBOPLASTIN TIME PARTIAL: CPT | Performed by: THORACIC SURGERY (CARDIOTHORACIC VASCULAR SURGERY)

## 2022-08-01 PROCEDURE — 99291 CRITICAL CARE FIRST HOUR: CPT | Mod: ,,, | Performed by: ANESTHESIOLOGY

## 2022-08-01 PROCEDURE — 93750 INTERROGATION VAD IN PERSON: CPT | Mod: ,,, | Performed by: INTERNAL MEDICINE

## 2022-08-01 PROCEDURE — 82803 BLOOD GASES ANY COMBINATION: CPT

## 2022-08-01 PROCEDURE — 25000242 PHARM REV CODE 250 ALT 637 W/ HCPCS: Performed by: THORACIC SURGERY (CARDIOTHORACIC VASCULAR SURGERY)

## 2022-08-01 PROCEDURE — 99291 PR CRITICAL CARE, E/M 30-74 MINUTES: ICD-10-PCS | Mod: ,,, | Performed by: INTERNAL MEDICINE

## 2022-08-01 PROCEDURE — 94640 AIRWAY INHALATION TREATMENT: CPT

## 2022-08-01 PROCEDURE — 83735 ASSAY OF MAGNESIUM: CPT | Mod: 91 | Performed by: THORACIC SURGERY (CARDIOTHORACIC VASCULAR SURGERY)

## 2022-08-01 PROCEDURE — 86140 C-REACTIVE PROTEIN: CPT | Performed by: STUDENT IN AN ORGANIZED HEALTH CARE EDUCATION/TRAINING PROGRAM

## 2022-08-01 PROCEDURE — 94003 VENT MGMT INPAT SUBQ DAY: CPT

## 2022-08-01 PROCEDURE — 82565 ASSAY OF CREATININE: CPT

## 2022-08-01 PROCEDURE — 20000000 HC ICU ROOM

## 2022-08-01 PROCEDURE — 83605 ASSAY OF LACTIC ACID: CPT

## 2022-08-01 PROCEDURE — 84439 ASSAY OF FREE THYROXINE: CPT | Performed by: STUDENT IN AN ORGANIZED HEALTH CARE EDUCATION/TRAINING PROGRAM

## 2022-08-01 PROCEDURE — 99291 CRITICAL CARE FIRST HOUR: CPT | Mod: ,,, | Performed by: INTERNAL MEDICINE

## 2022-08-01 PROCEDURE — 25000003 PHARM REV CODE 250: Performed by: PHYSICIAN ASSISTANT

## 2022-08-01 PROCEDURE — 80053 COMPREHEN METABOLIC PANEL: CPT | Mod: 91 | Performed by: THORACIC SURGERY (CARDIOTHORACIC VASCULAR SURGERY)

## 2022-08-01 RX ORDER — INSULIN ASPART 100 [IU]/ML
2 INJECTION, SOLUTION INTRAVENOUS; SUBCUTANEOUS EVERY 4 HOURS
Status: DISCONTINUED | OUTPATIENT
Start: 2022-08-01 | End: 2022-08-02

## 2022-08-01 RX ORDER — POTASSIUM CHLORIDE 29.8 MG/ML
40 INJECTION INTRAVENOUS ONCE
Status: COMPLETED | OUTPATIENT
Start: 2022-08-01 | End: 2022-08-01

## 2022-08-01 RX ORDER — MAGNESIUM SULFATE HEPTAHYDRATE 40 MG/ML
2 INJECTION, SOLUTION INTRAVENOUS ONCE
Status: COMPLETED | OUTPATIENT
Start: 2022-08-01 | End: 2022-08-01

## 2022-08-01 RX ORDER — POTASSIUM CHLORIDE 14.9 MG/ML
20 INJECTION INTRAVENOUS ONCE
Status: COMPLETED | OUTPATIENT
Start: 2022-08-01 | End: 2022-08-01

## 2022-08-01 RX ADMIN — ASPIRIN 81 MG CHEWABLE TABLET 81 MG: 81 TABLET CHEWABLE at 09:08

## 2022-08-01 RX ADMIN — HYPROMELLOSE 2910 1 DROP: 5 SOLUTION OPHTHALMIC at 03:08

## 2022-08-01 RX ADMIN — VASOPRESSIN 0.04 UNITS/MIN: 20 INJECTION INTRAVENOUS at 03:08

## 2022-08-01 RX ADMIN — MEXILETINE HYDROCHLORIDE 400 MG: 200 CAPSULE ORAL at 09:08

## 2022-08-01 RX ADMIN — DEXMEDETOMIDINE HYDROCHLORIDE 0.6 MCG/KG/HR: 4 INJECTION INTRAVENOUS at 06:08

## 2022-08-01 RX ADMIN — HYDROMORPHONE HYDROCHLORIDE 0.5 MG: 1 INJECTION, SOLUTION INTRAMUSCULAR; INTRAVENOUS; SUBCUTANEOUS at 04:08

## 2022-08-01 RX ADMIN — GUAIFENESIN 300 MG: 100 SOLUTION ORAL at 12:08

## 2022-08-01 RX ADMIN — POLYETHYLENE GLYCOL 3350 17 G: 17 POWDER, FOR SOLUTION ORAL at 09:08

## 2022-08-01 RX ADMIN — AMIODARONE HYDROCHLORIDE 400 MG: 200 TABLET ORAL at 03:08

## 2022-08-01 RX ADMIN — CEFEPIME 2 G: 2 INJECTION, POWDER, FOR SOLUTION INTRAVENOUS at 09:08

## 2022-08-01 RX ADMIN — ACETYLCYSTEINE 4 ML: 100 SOLUTION ORAL; RESPIRATORY (INHALATION) at 07:08

## 2022-08-01 RX ADMIN — METHIMAZOLE 20 MG: 10 TABLET ORAL at 08:08

## 2022-08-01 RX ADMIN — ACETYLCYSTEINE 4 ML: 100 SOLUTION ORAL; RESPIRATORY (INHALATION) at 12:08

## 2022-08-01 RX ADMIN — LEVALBUTEROL HYDROCHLORIDE 0.63 MG: 0.63 SOLUTION RESPIRATORY (INHALATION) at 08:08

## 2022-08-01 RX ADMIN — MIDODRINE HYDROCHLORIDE 15 MG: 5 TABLET ORAL at 09:08

## 2022-08-01 RX ADMIN — POTASSIUM CHLORIDE 20 MEQ: 200 INJECTION, SOLUTION INTRAVENOUS at 04:08

## 2022-08-01 RX ADMIN — GUAIFENESIN 300 MG: 100 SOLUTION ORAL at 11:08

## 2022-08-01 RX ADMIN — VASOPRESSIN 0.04 UNITS/MIN: 20 INJECTION INTRAVENOUS at 05:08

## 2022-08-01 RX ADMIN — FUROSEMIDE 2.5 MG/HR: 10 INJECTION, SOLUTION INTRAMUSCULAR; INTRAVENOUS at 03:08

## 2022-08-01 RX ADMIN — INSULIN ASPART 2 UNITS: 100 INJECTION, SOLUTION INTRAVENOUS; SUBCUTANEOUS at 03:08

## 2022-08-01 RX ADMIN — CHOLESTYRAMINE 4 G: 4 POWDER, FOR SUSPENSION ORAL at 05:08

## 2022-08-01 RX ADMIN — Medication 0.04 MCG/KG/MIN: at 03:08

## 2022-08-01 RX ADMIN — DEXMEDETOMIDINE HYDROCHLORIDE 0.7 MCG/KG/HR: 4 INJECTION INTRAVENOUS at 05:08

## 2022-08-01 RX ADMIN — ACETYLCYSTEINE 4 ML: 100 SOLUTION ORAL; RESPIRATORY (INHALATION) at 08:08

## 2022-08-01 RX ADMIN — INSULIN ASPART 2 UNITS: 100 INJECTION, SOLUTION INTRAVENOUS; SUBCUTANEOUS at 08:08

## 2022-08-01 RX ADMIN — HEPARIN SODIUM 2000 UNITS/HR: 5000 INJECTION INTRAVENOUS; SUBCUTANEOUS at 04:08

## 2022-08-01 RX ADMIN — CHOLESTYRAMINE 4 G: 4 POWDER, FOR SUSPENSION ORAL at 08:08

## 2022-08-01 RX ADMIN — GUAIFENESIN 300 MG: 100 SOLUTION ORAL at 05:08

## 2022-08-01 RX ADMIN — PREDNISONE 40 MG: 20 TABLET ORAL at 09:08

## 2022-08-01 RX ADMIN — CHOLESTYRAMINE 4 G: 4 POWDER, FOR SUSPENSION ORAL at 03:08

## 2022-08-01 RX ADMIN — Medication 0.04 MCG/KG/MIN: at 12:08

## 2022-08-01 RX ADMIN — METRONIDAZOLE 500 MG: 500 TABLET ORAL at 05:08

## 2022-08-01 RX ADMIN — METHIMAZOLE 20 MG: 10 TABLET ORAL at 09:08

## 2022-08-01 RX ADMIN — METRONIDAZOLE 500 MG: 500 TABLET ORAL at 09:08

## 2022-08-01 RX ADMIN — MIDODRINE HYDROCHLORIDE 15 MG: 5 TABLET ORAL at 03:08

## 2022-08-01 RX ADMIN — METRONIDAZOLE 500 MG: 500 TABLET ORAL at 03:08

## 2022-08-01 RX ADMIN — DIPHENHYDRAMINE HYDROCHLORIDE 5 ML: 25 SOLUTION ORAL at 04:08

## 2022-08-01 RX ADMIN — CHOLESTYRAMINE 4 G: 4 POWDER, FOR SUSPENSION ORAL at 09:08

## 2022-08-01 RX ADMIN — MAGNESIUM SULFATE 2 G: 2 INJECTION INTRAVENOUS at 09:08

## 2022-08-01 RX ADMIN — HYDROXYZINE HYDROCHLORIDE 25 MG: 25 TABLET ORAL at 07:08

## 2022-08-01 RX ADMIN — MIDODRINE HYDROCHLORIDE 15 MG: 5 TABLET ORAL at 05:08

## 2022-08-01 RX ADMIN — HYDROMORPHONE HYDROCHLORIDE 1 MG: 1 INJECTION, SOLUTION INTRAMUSCULAR; INTRAVENOUS; SUBCUTANEOUS at 07:08

## 2022-08-01 RX ADMIN — MEXILETINE HYDROCHLORIDE 400 MG: 200 CAPSULE ORAL at 11:08

## 2022-08-01 RX ADMIN — ACETYLCYSTEINE 4 ML: 100 SOLUTION ORAL; RESPIRATORY (INHALATION) at 01:08

## 2022-08-01 RX ADMIN — SMOFLIPID 250 ML: 6; 6; 5; 3 INJECTION, EMULSION INTRAVENOUS at 09:08

## 2022-08-01 RX ADMIN — PANTOPRAZOLE SODIUM 40 MG: 40 INJECTION, POWDER, FOR SOLUTION INTRAVENOUS at 09:08

## 2022-08-01 RX ADMIN — LEVALBUTEROL HYDROCHLORIDE 0.63 MG: 0.63 SOLUTION RESPIRATORY (INHALATION) at 01:08

## 2022-08-01 RX ADMIN — ASCORBIC ACID, VITAMIN A PALMITATE, CHOLECALCIFEROL, THIAMINE HYDROCHLORIDE, RIBOFLAVIN-5 PHOSPHATE SODIUM, PYRIDOXINE HYDROCHLORIDE, NIACINAMIDE, DEXPANTHENOL, ALPHA-TOCOPHEROL ACETATE, VITAMIN K1, FOLIC ACID, BIOTIN, CYANOCOBALAMIN: 200; 3300; 200; 6; 3.6; 6; 40; 15; 10; 150; 600; 60; 5 INJECTION, SOLUTION INTRAVENOUS at 09:08

## 2022-08-01 RX ADMIN — HEPARIN SODIUM 2000 UNITS/HR: 5000 INJECTION INTRAVENOUS; SUBCUTANEOUS at 02:08

## 2022-08-01 RX ADMIN — LEVALBUTEROL HYDROCHLORIDE 0.63 MG: 0.63 SOLUTION RESPIRATORY (INHALATION) at 12:08

## 2022-08-01 RX ADMIN — DEXMEDETOMIDINE HYDROCHLORIDE 0.6 MCG/KG/HR: 4 INJECTION INTRAVENOUS at 11:08

## 2022-08-01 RX ADMIN — AMIODARONE HYDROCHLORIDE 400 MG: 200 TABLET ORAL at 08:08

## 2022-08-01 RX ADMIN — AMIODARONE HYDROCHLORIDE 0.5 MG/MIN: 1.8 INJECTION, SOLUTION INTRAVENOUS at 09:08

## 2022-08-01 RX ADMIN — INSULIN ASPART 2 UNITS: 100 INJECTION, SOLUTION INTRAVENOUS; SUBCUTANEOUS at 11:08

## 2022-08-01 RX ADMIN — INSULIN ASPART 1 UNITS: 100 INJECTION, SOLUTION INTRAVENOUS; SUBCUTANEOUS at 08:08

## 2022-08-01 RX ADMIN — LEVALBUTEROL HYDROCHLORIDE 0.63 MG: 0.63 SOLUTION RESPIRATORY (INHALATION) at 07:08

## 2022-08-01 RX ADMIN — CEFEPIME 2 G: 2 INJECTION, POWDER, FOR SOLUTION INTRAVENOUS at 08:08

## 2022-08-01 RX ADMIN — PANTOPRAZOLE SODIUM 40 MG: 40 INJECTION, POWDER, FOR SOLUTION INTRAVENOUS at 08:08

## 2022-08-01 RX ADMIN — AMIODARONE HYDROCHLORIDE 400 MG: 200 TABLET ORAL at 09:08

## 2022-08-01 RX ADMIN — INSULIN ASPART 2 UNITS: 100 INJECTION, SOLUTION INTRAVENOUS; SUBCUTANEOUS at 12:08

## 2022-08-01 RX ADMIN — POTASSIUM CHLORIDE 40 MEQ: 29.8 INJECTION, SOLUTION INTRAVENOUS at 03:08

## 2022-08-01 NOTE — NURSING
Dr. Kaufman at bedside - VAD speed changed to 6300, Marilia weaned to 6, lidocaine gtt weaned to 0.5, and amio gtt weaned to 0.5 per MD. Instructed to turn lidocaine gtt off in AM and wean Marilia to 4 in AM.

## 2022-08-01 NOTE — ASSESSMENT & PLAN NOTE
Key History and Diagnostic Findings  - History of AIT type 2 (TRAb and TSI negative; Thyroid US unremarkable with low vascularity)  - Weaned off methimazole and prednisone by May 2020, then developed hypothyroidism, LT4 started and dose titrated per TFTs. Prior to current admission he was on LT4 112 mcg daily  - Labs consistent with hypothyroidism until current admission, initially on 7/13, with low TSH and elevated fT4. Repeat TFTs on 7/27 with worsening hyperthyroidism. He continued to receive LT4 112 mcg through 7/28. He remains on amiodarone for episodes of VT  - Suspect current hyperthyroidism is AIT, however continued exogenous LT4 certainly contribute to current presentation   - Free T4 continues to decrease  Lab Results   Component Value Date    TSH <0.010 (L) 07/27/2022    TSH 0.111 (L) 07/13/2022    TSH 4.079 (H) 03/17/2022    FREET4 2.59 (H) 08/02/2022    FREET4 2.71 (H) 08/01/2022    FREET4 3.02 (H) 07/31/2022     - Noted plans for tracheostomy tube    Plan  - Although mechanism unknown, strongly suspect AIT (type 1 or 2); continuing empiric treatment for both  - Continue Methimazole 20mg BID  - Continue Prednisone 40mg daily  - Continue Cholestyramine 4g QID for now  - Continue to check daily free T4 until within normal range

## 2022-08-01 NOTE — PLAN OF CARE
Significant events: Nitric oxide weaned to 1 ppm.   Vitals/Respiratory:  Mechanically ventilated, AC VC+,  FiO2 60%, Peep 6, Rate 24.   O2: 100%.    HR: 60-90's, NSR.   MAP: >65.  CVP: 11-12.  Temperature max:  100.6 F.   Gtts:  Epinephrine at 0.04 mcg/kg/min, Vasopressin at 0.04 units/min, Amiodarone at 0.5 mg/min, Lasix at 2.5 mg/hr, Heparin, and Precedex.    UOP: 200-350 cc/hr from sun catheter.      Last Bowel Movement:  7/28/22.  LVAD:  HM3.  Speed 6300.  Low speed 5900.  Flows 5.2-5.4.  PI 3.4-3.7.  Power 5.3-5.4.  No alarms this shift. Minimal yellow drainage to DLES on left. Old DLES to right with minimal serosanguinous drainage.  Neuro: Pupils equal and reactive. Follows commands, and moves all extremities purposefully.      Diet:  NPO. TPN.   Labs/Accuchecks:   Labs Q8 hrs. Accuchecks Q4 hrs.      Plan:  Turn Nitric oxide off and wean FiO2 overnight if tolerated. Plan for trach on Wednesday. Plan of care reviewed with patient and his wife, all questions answered.     Skin:  Wound care provided to right ear, chest, and bilateral DLES as ordered. Turned Q2 hrs. Immerse mattress, heel boots, and sacral foam in use. No new skin breakdown noted.

## 2022-08-01 NOTE — PROGRESS NOTES
[facility]  Critical Care - Surgery  Progress Note    Patient Name: Tim Richards  MRN: [id number]  Admission Date: 6/27/2022  Hospital Length of Stay: 35 days  Code Status: Full Code  Attending Provider: Yg Kaufman MD  Primary Care Provider: Deyanira Booth MD   Principal Problem: Left ventricular assist device (LVAD) complication    Subjective:     Hospital/ICU Course:  No notes on file    Interval History/Significant Events: NAEON. Lido drip dced. Weaning nitric. Still on epi and vaso .04.    Patient only on precedex.         Follow-up For: Procedure(s) (LRB):  REMOVAL, FOREIGN BODY (N/A)    Post-Operative Day: 10 Days Post-Op    Objective:     Vital Signs (Most Recent):  Temp: 100.2 °F (37.9 °C) (08/01/22 1134)  Pulse: 71 (08/01/22 1134)  Resp: (!) 24 (08/01/22 0806)  BP: (!) 76/0 (08/01/22 1105)  SpO2: 100 % (08/01/22 0508)   Vital Signs (24h Range):  Temp:  [99.32 °F (37.4 °C)-101.48 °F (38.6 °C)] 100.2 °F (37.9 °C)  Pulse:  [62-91] 71  Resp:  [20-25] 24  SpO2:  [95 %-100 %] 100 %  BP: (68-78)/(0) 76/0  Arterial Line BP: (69-82)/(55-70) 81/69     Weight: 100.7 kg (222 lb)  Body mass index is 29.29 kg/m².      Intake/Output Summary (Last 24 hours) at 8/1/2022 1250  Last data filed at 8/1/2022 1200  Gross per 24 hour   Intake 3167.34 ml   Output 5710 ml   Net -2542.66 ml       Physical Exam  Constitutional:       Comments: Intubated and sedated   HENT:      Head: Normocephalic and atraumatic.      Nose:      Comments: NG in place  Eyes:      Pupils: Pupils are equal, round, and reactive to light.   Neck:      Comments: R IJ CVC    Cardiovascular:      Rate and Rhythm: Normal rate. Rhythm irregular.      Comments: LVAD in place -- 6400 rpm, 5.0L    Midline sternotomy c/d with dressing in place      Pulmonary:      Comments: Intubated      Abdominal:      General: There is no distension.      Palpations: Abdomen is soft.      Comments: Prior driveline site packed with iodoform  gauze   Genitourinary:     Comments: Lagunas in place draining clear urine  Musculoskeletal:      Comments: ECMO cannula sites with dressing in place.     Cutdown incision with seroma with wound vac in place, serous output. Changed yesterday    right radial arterial line   Skin:     General: Skin is warm and dry.   Neurological:      Comments: Sedated       Vents:  Vent Mode: A/C (08/01/22 1134)  Ventilator Initiated: Yes (07/29/22 0002)  Set Rate: 24 BPM (08/01/22 1134)  Vt Set: 550 mL (08/01/22 1134)  Pressure Support: 8 cmH20 (07/31/22 2043)  PEEP/CPAP: 8 cmH20 (08/01/22 1134)  Oxygen Concentration (%): 60 (08/01/22 1134)  Peak Airway Pressure: 32 cmH2O (08/01/22 1134)  Plateau Pressure: 30 cmH20 (08/01/22 1134)  Total Ve: 13.4 mL (08/01/22 1134)  Negative Inspiratory Force (cm H2O): 0 (08/01/22 1134)  F/VT Ratio<105 (RSBI): (!) 47.81 (08/01/22 0806)    Lines/Drains/Airways       Central Venous Catheter Line  Duration             Percutaneous Central Line Insertion/Assessment - Quad Lumen  07/21/22 1515 right internal jugular 10 days              Drain  Duration                  NG/OG Tube 07/15/22 1030 Left nostril 17 days         Urethral Catheter 07/27/22 1536 Temperature probe 16 Fr. 4 days              Airway  Duration                  Airway - Non-Surgical 07/29/22 0042 3 days              Arterial Line  Duration             Arterial Line 07/30/22 0900 Right Radial 2 days              Line  Duration                  VAD 07/13/22 1300 Left ventricular assist device HeartMate 3 18 days              Peripheral Intravenous Line  Duration                  Midline Catheter Insertion/Assessment  - Single Lumen 07/21/22 1616 Left basilic vein (medial side of arm) 18g x 10cm 10 days         Peripheral IV - Single Lumen 07/30/22 0648 22 G Posterior;Right Hand 2 days                    Significant Labs:    CBC/Anemia Profile:  Recent Labs   Lab 07/31/22  2027 08/01/22  0314 08/01/22  0314 08/01/22  1210   WBC 15.14*  15.04*  --  15.22*   HGB 7.3* 7.3*  --  7.2*   HCT 23.4* 23.6* 25* 23.6*    389  --  354   MCV 92 91  --  95   RDW 21.5* 21.1*  --  21.2*        Chemistries:  Recent Labs   Lab 07/31/22  1153 07/31/22 2027 08/01/22  0314    138 139   K 4.8 4.8 4.1    107 105   CO2 21* 20* 22*   BUN 84* 83* 85*   CREATININE 2.3* 2.2* 2.1*   CALCIUM 9.2 9.2 9.4   ALBUMIN 1.9* 1.9* 2.0*   PROT 6.8 6.7 6.9   BILITOT 6.2* 5.7* 6.2*   ALKPHOS 91 88 89   ALT 30 29 30   AST 37 33 36   MG 2.2 2.1 2.5   PHOS 3.9 3.6 3.3       CMP:   Recent Labs   Lab 07/31/22  0402 07/31/22  1153 07/31/22 2027 08/01/22  0314    139 138 139   K 4.0 4.8 4.8 4.1    106 107 105   CO2 20* 21* 20* 22*   * 194* 156* 166*   BUN 83* 84* 83* 85*   CREATININE 2.5* 2.3* 2.2* 2.1*   CALCIUM 9.1 9.2 9.2 9.4   PROT 6.5 6.8 6.7 6.9   ALBUMIN 1.9* 1.9* 1.9* 2.0*   BILITOT 6.3* 6.2* 5.7* 6.2*   ALKPHOS 93 91 88 89   AST 34 37 33 36   ALT 31 30 29 30   ANIONGAP 12 12 11 12   EGFRNONAA 27.9* 30.8* 32.5*  --        Significant Imaging:  I have reviewed all pertinent imaging results/findings within the past 24 hours.    Assessment/Plan:     Chronic combined systolic and diastolic heart failure  Tim Richards is a 56 y.o. male HFrEF with an EF of 10% and a history of HM2 LVAD in 2018 who is now s/p HM3 upgrade, initiation of V-A ECMO after failure to wean off bypass x2      Neuro/Psych:   -- Sedation: precedex  -- Pain: PRN Dilaudid             Cards:   -- S/P LVAD exchange on 7/14/22  --Improving pressor requirements, wean gwen for MAP > 75, keep other pressors the same   Epi 0.04   Vaso 0.04   Giapreza off  -- Continue Midodrine 15 mg Q8  -- Stress dose steroids complete  -- MAP goal 75  -- VAD  flows stable  -- Decannulated on 7/19  -- Sternal closure on 7/15  -- Bedside drive line removal on 7/23  -- EP consult due to v-tach  -- restarted amio ggt, restarted mexiletine      Pulm:   -- Goal O2 sat > 90%  -- ABG PRN  -- Chest tubes  removed  -- Nitric weaned at 20  -- Extubated 7/26, re intubated 7/28  -- Monitor O2 sat with ABGs  -- Bronch 7/29 without significant findings,7/30 with mucus plug removed      Renal:  -- Keep sun for strict I/O  -- Trend BUN/Cr  -- Lasix gtt 2.5/hr        FEN / GI:   -- Replace lytes as needed  -- Nutrition: NPO,TPN  -- GI ppx: PPI  -- Bowel reg: miralax, docusate, mag citrate      ID:   -- Tm: febrile; WBC increasing   -- ABX cefepime, falgyl  -- Cultures sent, repeat cultures sent 7/22, no growth to date, repeat cultures sent 7/26 --> gram negative rods on sputum cultures  -- BAL GNR --> pansensitive klebsiella   -- Repeat sputum cultures --> pansensitive klebsiella  -- ID consult   -- daily vanc levels  -- Line exchange 7/21  -- UA with many bacteria and WBCs, culture pending  -- CT without obvious source of infection, lungs with large amounts of atlectasis  -- Covid test negative      Heme/Onc:   -- H/H stable   -- Daily CBC  -- Received 18 pRBCs, 16 FFP, 2 plt, 25 cryo; 1500 albumin intra-op  -- Heparin gtt at 2000, do not titrate        Endo:   -- BG goal 140-180  -- SSI  -- Levothyroxine discontinued  -- hyperthyroid despite being hypothyroid at home  -- Endocrine consulted  -- Started on prednisone and methimazole       PPx:   Feeding: NPO, TPN  Analgesia/Sedation: Precedex/ PRN Dilaudid  Thromboembolic prevention: SCDs, heparin gtt  HOB >30: Yes  Stress Ulcer ppx: PPI  Glucose control: Critical care goal 140-180 g/dl, ISS     Lines/Drains/Airway: NG; RIJ CVC, r-radial a-line, sun; WV, VAD; midline      Dispo/Code Status/Palliative:   -- SICU / Full Code.               Critical secondary to Patient has a condition that poses threat to life and bodily function: Requiring pressers      Critical care was time spent personally by me on the following activities: development of treatment plan with patient or surrogate and bedside caregivers, discussions with consultants, evaluation of patient's  response to treatment, examination of patient, ordering and performing treatments and interventions, ordering and review of laboratory studies, ordering and review of radiographic studies, pulse oximetry, re-evaluation of patient's condition.  This critical care time did not overlap with that of any other provider or involve time for any procedures.     Randell Jackson MD  Critical Care - Surgery  [facility]

## 2022-08-01 NOTE — PROGRESS NOTES
"[facility]  Endocrinology  Progress Note    Admit Date: 6/27/2022     56 year-old  male with NICM with LVAD, s/p ICD for VT on amiodarone and mexiletine and AIT followed by hypothyroidism initially admitted 6/27/2022 with COVID respiratory failure complicated with LVAD pump thrombosis that was refractory to medical therapy. Underwent LVAD pump exchange. Post-op course complicated by worsening cardiogenic shock/RV failure requiring VA-ECMO. Improved clinically underwent successful decannulation. Continued episodes of VT with ICD shock 7/21 and ongoing anti-arrhythmic mediation adjustments. TFTs on 7/27 significant for recurrent hyperthyroidism with undetectable TSH and elevated fT4.    - Patient re-intubated 07/29/2022     - Endocrinology consulted for recurrent AIT      With regards to AIT:    - Last seen outpatient by Dr. 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 He required a prolonged course of PDN and MMI, then subsequently developed hypothyroidism in 5/2020. LT4 was adjusted based on serial TFT measurements. Most recently levothyroxine dose has been 112 mcg     - He self-decreased his amiodarone dose to 200 mg daily about 6 months ago. T4 was high on 7/13, but he was continued on levothyroxine 112 mcg    Lab Results   Component Value Date    TSH <0.010 (L) 07/27/2022    TSH 0.111 (L) 07/13/2022    TSH 4.079 (H) 03/17/2022    FREET4 2.71 (H) 08/01/2022    FREET4 3.02 (H) 07/31/2022    FREET4 3.25 (H) 07/30/2022       Interval HPI:   Overnight events: No actute events reported overnight  Eating:   NPO  Nausea: No  Hypoglycemia and intervention: No  Fever: No  TPN and/or TF: Yes  If yes, type of TF/TPN and rate: TF at 30 ml/hr    BP (!) 70/0 (BP Location: Right arm, Patient Position: Lying)   Pulse 69   Temp (!) 100.76 °F (38.2 °C)   Resp (!) 24   Ht 6' 1" (1.854 m)   Wt 100.7 kg (222 lb)   SpO2 " 100%   BMI 29.29 kg/m²     Labs Reviewed and Include    Recent Labs   Lab 08/01/22  0314   *   CALCIUM 9.4   ALBUMIN 2.0*   PROT 6.9      K 4.1   CO2 22*      BUN 85*   CREATININE 2.1*   ALKPHOS 89   ALT 30   AST 36   BILITOT 6.2*     Lab Results   Component Value Date    WBC 15.04 (H) 08/01/2022    HGB 7.3 (L) 08/01/2022    HCT 25 (L) 08/01/2022    MCV 91 08/01/2022     08/01/2022     Recent Labs   Lab 07/27/22  0348 07/29/22  0218 07/31/22  0402 08/01/22  0314   TSH <0.010*  --   --   --    FREET4 2.45*   < > 3.02* 2.71*    < > = values in this interval not displayed.     Lab Results   Component Value Date    HGBA1C 5.4 04/26/2021       Nutritional status:   Body mass index is 29.29 kg/m².  Lab Results   Component Value Date    ALBUMIN 2.0 (L) 08/01/2022    ALBUMIN 1.9 (L) 07/31/2022    ALBUMIN 1.9 (L) 07/31/2022     Lab Results   Component Value Date    PREALBUMIN 13 (L) 07/29/2022    PREALBUMIN 11 (L) 07/17/2022    PREALBUMIN 10 (L) 07/15/2022       Estimated Creatinine Clearance: 49.6 mL/min (A) (based on SCr of 2.1 mg/dL (H)).    Accu-Checks  Recent Labs     07/30/22  1606 07/30/22  1935 07/31/22  0011 07/31/22  0403 07/31/22  0823 07/31/22  1156 07/31/22  1507 07/31/22  2028 08/01/22  0012 08/01/22  0315   POCTGLUCOSE 199* 162* 184* 192* 150* 183* 179* 142* 159* 165*       Current Medications and/or Treatments Impacting Glycemic Control  Immunotherapy:    Immunosuppressants       None          Steroids:   Hormones (From admission, onward)                Start     Stop Route Frequency Ordered    07/28/22 1545  predniSONE tablet 40 mg         -- PER NG TUBE Daily 07/28/22 1542    07/15/22 0900  vasopressin (PITRESSIN) 1 Units/mL in dextrose 5 % 100 mL infusion         -- IV Continuous 07/15/22 0900          Pressors:    Autonomic Drugs (From admission, onward)                Start     Stop Route Frequency Ordered    07/29/22 0011  EPINEPHrine (ADRENALIN) 1 mg/mL injection        Note  to Pharmacy: Created by cabinet override    07/29 1214   07/29/22 0011    07/29/22 0007  EPINEPHrine 5 mg in dextrose 5% 250 mL infusion (premix)        Question Answer Comment   Begin at (in mcg/kg/min): 0.01    Titrate by: (in mcg/kg/min) 0.01    Titrate interval: (in minutes) 10    Titrate to maintain: (SBP or MAP or Cardiac Index) MAP    Greater than: (in mmHg) 65    Maximum dose: (in mcg/kg/min) 2        -- IV Continuous 07/29/22 0008    07/23/22 1400  midodrine tablet 15 mg         -- PER NG TUBE Every 8 hours 07/23/22 0944    07/15/22 0900  NORepinephrine 8 mg in dextrose 5% 250 mL infusion        Question Answer Comment   Begin at (in mcg/kg/min): 0.1    Titrate by: (in mcg/kg/min) 0.02    Titrate interval: (in minutes) 20    Titrate to maintain: (MAP or SBP) MAP    Greater than: (in mmHg) 65    Maximum dose: (in mcg/kg/min) 0.2        -- IV Continuous 07/15/22 0900          Hyperglycemia/Diabetes Medications:   Antihyperglycemics (From admission, onward)                Start     Stop Route Frequency Ordered    07/30/22 1023  insulin aspart U-100 pen 0-5 Units         -- SubQ Every 4 hours PRN 07/30/22 0925            ASSESSMENT and PLAN    Amiodarone-induced hyperthyroidism  - History of AIT type 2 (TRAb and TSI negative; Thyroid US unremarkable with low vascularity)  - Weaned off methimazole and prednisone by May 2020, then developed hypothyroidism, LT4 started and dose titrated per TFTs. Prior to current admission he was on LT4 112 mcg daily  - Labs consistent with hypothyroidism until current admission, initially on 7/13, with low TSH and elevated fT4. Repeat TFTs on 7/27 with worsening hyperthyroidism. He continued to receive LT4 112 mcg through 7/28. He remains on amiodarone for episodes of VT  - Suspect current hyperthyroidism is AIT, however continued exogenous LT4 certainly contribute to current presentation   - Free T4 continues to decrease    Lab Results   Component Value Date    TSH <0.010 (L)  07/27/2022    TSH 0.111 (L) 07/13/2022    TSH 4.079 (H) 03/17/2022    FREET4 2.71 (H) 08/01/2022    FREET4 3.02 (H) 07/31/2022    FREET4 3.25 (H) 07/30/2022     Recommendations:  - Although mechanism unknown, strongly suspect AIT (type 1 or 2); continuing empiric treatment for both  - Continue Methimazole 20mg BID  - Continue Prednisone 40mg qd  - Continue Cholestyramine 4g QID for now  - Check daily free T4 and total T3 for now    amiodarone induced hypothyrodism  - Levothyroxine discontinued on 7/28/2022  - Please see plan as above    Hyperglycemia  Key History and Diagnostic Findings  - Hyperglycemia noted once starting tube feeds in addition to steroids  - No prior history of diabetes; most recent A1c of 5.4 on 04/26/2021    Plan  - Continue fingerstick checks q4h with low correction for continuous enteral feeding  - Please ensure that accuchecks are being documented q4h per order  - Will add scheduled aspart if requiring correction regularly    Akira Zuñiga DO  [facility], [address]th [address]  [address] [place], [place], [address]    Office: [phone]  Fax: [phone]    Disclaimer: This note has been generated using voice-recognition software. There may be typographical errors that have been missed during proof-reading.    The above history labs imaging impression and plan were discussed with attending physician who is in agreement and also took part in this patient's care.  I personally reviewed all of the patients available medications, labs, imaging, vitals, allergies, medical history.

## 2022-08-01 NOTE — PROGRESS NOTES
Cardiac Surgery Progress Note     MIGDALIA overnight     Gtts  Epi 0.04  Vaso 0.04  Amiodarone  Lasix 2.5  Heparin     A/P  S/p Redo LVAD     Weaning nitric  Tracheostomy this week  Cefepime, flagyl  TPN  ASA  Heparin gtt  Lasix gtt  Lido off    Vidhi Nicolas MD, PGY-7  Cardiothoracic Surgery   [phone]

## 2022-08-01 NOTE — PT/OT/SLP PROGRESS
Occupational Therapy      Patient Name:  Tim Richards   MRN:  [id number]    Patient not seen today secondary to still intubated. Will follow-up.    8/1/2022

## 2022-08-01 NOTE — PROGRESS NOTES
Update    LCSW called Pt's wife Kelly, Pt is currently intubated. Kelly states she is doing well despite having a few low moments. Pt states she had good support (family & friends) and seeking counseling herself. Kelly states she remains hopeful. LCSW offered support and encouragement. Pt's wife states she doesn't have any additional needs or concerns at this time. SW providing ongoing psychosocial, counseling, & emotional support, education, resources, assistance, and discharge planning as indicated.  SW to continue to follow.

## 2022-08-01 NOTE — SUBJECTIVE & OBJECTIVE
"Interval HPI:   Overnight events: No actute events reported overnight  Eating:   NPO  Nausea: No  Hypoglycemia and intervention: No  Fever: No  TPN and/or TF: Yes  If yes, type of TF/TPN and rate: TF at 30 ml/hr    BP (!) 70/0 (BP Location: Right arm, Patient Position: Lying)   Pulse 69   Temp (!) 100.76 °F (38.2 °C)   Resp (!) 24   Ht 6' 1" (1.854 m)   Wt 100.7 kg (222 lb)   SpO2 100%   BMI 29.29 kg/m²     Labs Reviewed and Include    Recent Labs   Lab 08/01/22  0314   *   CALCIUM 9.4   ALBUMIN 2.0*   PROT 6.9      K 4.1   CO2 22*      BUN 85*   CREATININE 2.1*   ALKPHOS 89   ALT 30   AST 36   BILITOT 6.2*     Lab Results   Component Value Date    WBC 15.04 (H) 08/01/2022    HGB 7.3 (L) 08/01/2022    HCT 25 (L) 08/01/2022    MCV 91 08/01/2022     08/01/2022     Recent Labs   Lab 07/27/22  0348 07/29/22  0218 07/31/22  0402 08/01/22  0314   TSH <0.010*  --   --   --    FREET4 2.45*   < > 3.02* 2.71*    < > = values in this interval not displayed.     Lab Results   Component Value Date    HGBA1C 5.4 04/26/2021       Nutritional status:   Body mass index is 29.29 kg/m².  Lab Results   Component Value Date    ALBUMIN 2.0 (L) 08/01/2022    ALBUMIN 1.9 (L) 07/31/2022    ALBUMIN 1.9 (L) 07/31/2022     Lab Results   Component Value Date    PREALBUMIN 13 (L) 07/29/2022    PREALBUMIN 11 (L) 07/17/2022    PREALBUMIN 10 (L) 07/15/2022       Estimated Creatinine Clearance: 49.6 mL/min (A) (based on SCr of 2.1 mg/dL (H)).    Accu-Checks  Recent Labs     07/30/22  1606 07/30/22  1935 07/31/22  0011 07/31/22  0403 07/31/22  0823 07/31/22  1156 07/31/22  1507 07/31/22 2028 08/01/22  0012 08/01/22  0315   POCTGLUCOSE 199* 162* 184* 192* 150* 183* 179* 142* 159* 165*       Current Medications and/or Treatments Impacting Glycemic Control  Immunotherapy:    Immunosuppressants       None          Steroids:   Hormones (From admission, onward)                Start     Stop Route Frequency Ordered    " 07/28/22 1545  predniSONE tablet 40 mg         -- PER NG TUBE Daily 07/28/22 1542    07/15/22 0900  vasopressin (PITRESSIN) 1 Units/mL in dextrose 5 % 100 mL infusion         -- IV Continuous 07/15/22 0900          Pressors:    Autonomic Drugs (From admission, onward)                Start     Stop Route Frequency Ordered    07/29/22 0011  EPINEPHrine (ADRENALIN) 1 mg/mL injection        Note to Pharmacy: Created by cabinet override    07/29 1214   07/29/22 0011    07/29/22 0007  EPINEPHrine 5 mg in dextrose 5% 250 mL infusion (premix)        Question Answer Comment   Begin at (in mcg/kg/min): 0.01    Titrate by: (in mcg/kg/min) 0.01    Titrate interval: (in minutes) 10    Titrate to maintain: (SBP or MAP or Cardiac Index) MAP    Greater than: (in mmHg) 65    Maximum dose: (in mcg/kg/min) 2        -- IV Continuous 07/29/22 0008    07/23/22 1400  midodrine tablet 15 mg         -- PER NG TUBE Every 8 hours 07/23/22 0944    07/15/22 0900  NORepinephrine 8 mg in dextrose 5% 250 mL infusion        Question Answer Comment   Begin at (in mcg/kg/min): 0.1    Titrate by: (in mcg/kg/min) 0.02    Titrate interval: (in minutes) 20    Titrate to maintain: (MAP or SBP) MAP    Greater than: (in mmHg) 65    Maximum dose: (in mcg/kg/min) 0.2        -- IV Continuous 07/15/22 0900          Hyperglycemia/Diabetes Medications:   Antihyperglycemics (From admission, onward)                Start     Stop Route Frequency Ordered    07/30/22 1023  insulin aspart U-100 pen 0-5 Units         -- SubQ Every 4 hours PRN 07/30/22 0925

## 2022-08-01 NOTE — PROGRESS NOTES
08/01/2022  Carissa Dean    Current provider:  Yg Kaufman MD    Device interrogation:  TXP LVAD INTERROGATIONS 8/1/2022 8/1/2022 8/1/2022 8/1/2022 8/1/2022 8/1/2022 8/1/2022   Type HeartMate3 HeartMate3 HeartMate3 HeartMate3 HeartMate3 HeartMate3 HeartMate3   Flow 5.2 5.2 5.4 5.4 5.2 5.4 5.3   Speed 6300 6300 6300 6300 6300 6300 6300   PI 3.5 3.7 3.4 3.5 3.4 3.5 3.5   Power (Jackson) 5.3 5.4 5.3 5.3 5.2 5.3 5.3   LSL 5900 5900 5900 5900 5900 5900 5900   Low Flow Alarm - - - - - - -   Pulsatility - - - Intermittent pulse Intermittent pulse Intermittent pulse Intermittent pulse          Rounded on Tim Richards to ensure all mechanical assist device settings (IABP or VAD) were appropriate and all parameters were within limits.  I was able to ensure all back up equipment was present, the staff had no issues, and the Perfusion Department daily rounding was complete.      For implantable VADs: Interrogation of Ventricular assist device was performed with analysis of device parameters and review of device function. I have personally reviewed the interrogation findings and agree with findings as stated.     In emergency, the nursing units have been notified to contact the perfusion department either by:  Calling [phone] from 630am to 4pm Mon thru Fri, utilizing the On-Call Finder functionality of Epic and searching for Perfusion, or by contacting the hospital  from 4pm to 630am and on weekends and asking to speak with the perfusionist on call.    11:32 AM

## 2022-08-01 NOTE — PLAN OF CARE
Recommendations    1) Resume tube feeds of Peptamen 1.5 with Prebio as medically feasible. Goal rate of 60 ml/hr to meet estimated kcal/protein needs - provides 2160 kcal, 98g protein, 1106mL water. Give Beneprotein QID to meet estimated protein needs; 1 packet = 25 kcal, 6g protein for a total of 2260 kcal/day, 122g protein/day.               -As pressor requirements increase, rec'd trickle feeds for best tolerance. Avoid holding TF for residuals less than 500mL unless associated with other s/s of intolerance such as N/V/D, abd pain/distention.      2) If PO/EN is not feasible, rec'd TPN for nutrition support. Goal macronutrients 120g aa, 300g dextrose, + IV SMOFlipids to provide 2000 kcal and 120g protein. GIR: 2.07 mg/kg/min.     3) RD to monitor and f/u.    Goals: Continue to meet % EEN/EPN by RD f/u date  Nutrition Goal Status: progressing towards goal  Communication of RD Recs: POC

## 2022-08-01 NOTE — PROGRESS NOTES
[facility]  Heart Failure  Progress Note     Hospital Length of Stay: 35    Interval History:  -Spiked temperature.   -In NSR.     Vitals:  Temp:  [98.78 °F (37.1 °C)-101.48 °F (38.6 °C)] 100.2 °F (37.9 °C)  Pulse:  [60-91] 71  Resp:  [16-25] 24  SpO2:  [95 %-100 %] 100 %  BP: (68-78)/(0) 76/0  Arterial Line BP: (69-82)/(55-70) 81/69    Intake/Output    Intake/Output Summary (Last 24 hours) at 8/1/2022 1138  Last data filed at 8/1/2022 1100  Gross per 24 hour   Intake 3307.83 ml   Output 5635 ml   Net -2327.17 ml       Physical Exam  Gen: Intubated and following some command.   HEENT: Pupils equal and reactive to light  Cardio: Regular rate, point of maximal impulse not displaced,1+ radial pulses bilaterally, 1+ DP pulses bilaterally, VAD hum obstructing heart sounds  Resp: crackles, rhonchi  Abd: Soft, non-tender, non-distended  : CLEO drainage  Skin: Warm, dry, peripheral edema  Neuro: intubated. Unable to assess.   Psych: unable to assess     Labs:  Recent Labs   Lab 07/31/22 1153 07/31/22 1956 07/31/22 2027 08/01/22 0314 08/01/22 0314   WBC 15.94*  --  15.14* 15.04*  --    HGB 7.4*  --  7.3* 7.3*  --    HCT 23.7*   < > 23.4* 23.6* 25*     --  387 389  --     < > = values in this interval not displayed.     Recent Labs   Lab 07/31/22 1153 07/31/22 2027 08/01/22 0314    138 139   K 4.8 4.8 4.1    107 105   CO2 21* 20* 22*   BUN 84* 83* 85*   CREATININE 2.3* 2.2* 2.1*   * 156* 166*   CALCIUM 9.2 9.2 9.4   MG 2.2 2.1 2.5   PHOS 3.9 3.6 3.3       Micro:    Current Meds:   acetylcysteine 100 mg/ml (10%)  4 mL Nebulization Q6H    amiodarone  400 mg Oral TID    aspirin  81 mg Per OG tube Daily    ceFEPime (MAXIPIME) IVPB  2 g Intravenous Q12H    cholestyramine  1 packet Per NG tube QID    guaiFENesin 100 mg/5 ml  300 mg Per NG tube Q6H    levalbuterol  0.63 mg Nebulization Q6H    lipid (SMOFLIPID)  250 mL Intravenous Daily    methIMAzole  20 mg Per NG tube  BID    metroNIDAZOLE  500 mg Oral Q8H    mexiletine  400 mg Oral Q8H    midodrine  15 mg Per NG tube Q8H    pantoprazole  40 mg Intravenous BID    polyethylene glycol  17 g Per NG tube Daily    predniSONE  40 mg Per NG tube Daily       Continuous Infusions:   amiodarone in dextrose 5% 0.5 mg/min (08/01/22 1100)    dexmedetomidine (PRECEDEX) infusion 0.6 mcg/kg/hr (08/01/22 1130)    dextrose 10 % in water (D10W)      EPINEPHrine 0.04 mcg/kg/min (08/01/22 1100)    furosemide (LASIX) 2 mg/mL continuous infusion (non-titrating) 2.5 mg/hr (08/01/22 1100)    heparin (porcine) in 5 % dex 2,000 Units/hr (08/01/22 1100)    LIDOcaine Stopped (08/01/22 0502)    nitric oxide gas      NORepinephrine bitartrate-D5W Stopped (07/30/22 2302)    propofoL Stopped (07/30/22 1027)    TPN ADULT CENTRAL LINE CUSTOM 40 mL/hr at 08/01/22 1100    TPN ADULT CENTRAL LINE CUSTOM      vasopressin 0.04 Units/min (08/01/22 1100)         Assessment and Plan:    Cardiogenic Shock  -Previous HM2, underwent pump exchange to HM3 on 7/13/22 complicated by worsening CS/RV failure requiring VA ECMO now decannulated  -Re-intubated on 7/29/22 due to hypercapnic resp failure  -Currently on Epi 0.04, vaso 0.04  -CTS primary    LVAD  TXP LVAD INTERROGATIONS 7/31/2022 7/31/2022 7/31/2022 7/31/2022 7/31/2022 7/31/2022 7/31/2022   Type HeartMate3 HeartMate3 HeartMate3 HeartMate3 HeartMate3 HeartMate3 HeartMate3   Flow 5.3 5.5 5.3 5.3 5.3 5.4 5.5   Speed 6200 6200 6200 6200 6200 6200 6200   PI 3.2 3.3 3.3 3.3 3.1 3.2 3.1   Power (Jackson) 5.2 5.1 5.1 5.2 5.1 5.1 5.1   LSL 5800 - - - 5800 - -   Low Flow Alarm - - - - - - -   Pulsatility Intermittent pulse - - - Intermittent pulse - -         Anxiety  -Currently intubated and sedated     History of ventricular tachycardia/Episode of A flutter 2:1 block  Patient experienced an episode of VT on 6/30 and experienced an ICD shock thought to be related to hypokalemia (K of 3.1 on 6/30)  - Aggressively  replete K, keep K>4 and Mg>2  - Continue mexiletine PO, lidocaine gtt, and amiodarone gtt   - EP consulted, appreciate their recommendations     COVID-19 virus infection  -Previously hypoxic then recovered  -ID was consulted, recommended Remdesivir, course completed     Elevated serum lactate dehydrogenase (LDH)  S/p HM3 exchange for HM2 pump thrombosis  Trend LDH daily.   LDH normalized.      amiodarone induced hypothyrodism initially, now hyperthyroidism  -Endocrine team on board.  -On prednisone and methimazole.   - T4 has trended down.      Chronic combined systolic and diastolic heart failure  ADHF  Underwent HM III upgrade on 7/13/22, currently on VA ECMO  Currently on Epi gtt, Vasopressin  Currently on Precedex  gtt for sedation  Being treated for sepsis.   On cefepime and flagyl.  Currently intubated and sedated (reintubarted 7/29)  Chest tube removal, bronch, and old driveline removed 7/22          Staff attestation to follow.    This note was prepared using voice recognition system and may have sound alike errors that may have been overlooked even with proof reading.

## 2022-08-01 NOTE — ASSESSMENT & PLAN NOTE
Key History and Diagnostic Findings  - Hyperglycemia noted once starting tube feeds in addition to steroids  - No prior history of diabetes; most recent A1c of 5.4 on 04/26/2021    Plan  - Increase scheduled aspart from 2 up to 3 units q4h with low correction while on tube feeds  - Please notify endocrine if any change in tube feeds as this will change insulin requirements  - Please ensure that accuchecks are being documented q4h per order

## 2022-08-01 NOTE — ASSESSMENT & PLAN NOTE
Tim Richards is a 56 y.o. male HFrEF with an EF of 10% and a history of HM2 LVAD in 2018 who is now s/p HM3 upgrade, initiation of V-A ECMO after failure to wean off bypass x2      Neuro/Psych:   -- Sedation: precedex  -- Pain: PRN Dilaudid             Cards:   -- S/P LVAD exchange on 7/14/22  --Improving pressor requirements, wean gwen for MAP > 75, keep other pressors the same   Epi 0.04   Vaso 0.04   Giapreza off  -- Continue Midodrine 15 mg Q8  -- Stress dose steroids complete  -- MAP goal 75  -- VAD  flows stable  -- Decannulated on 7/19  -- Sternal closure on 7/15  -- Bedside drive line removal on 7/23  -- EP consult due to v-tach  -- restarted amio ggt, restarted mexiletine      Pulm:   -- Goal O2 sat > 90%  -- ABG PRN  -- Chest tubes removed  -- Nitric weaned at 20  -- Extubated 7/26, re intubated 7/28  -- Monitor O2 sat with ABGs  -- Bronch 7/29 without significant findings,7/30 with mucus plug removed      Renal:  -- Keep sun for strict I/O  -- Trend BUN/Cr  -- Lasix gtt 2.5/hr        FEN / GI:   -- Replace lytes as needed  -- Nutrition: NPO,TPN  -- GI ppx: PPI  -- Bowel reg: miralax, docusate, mag citrate      ID:   -- Tm: febrile; WBC increasing   -- ABX cefepime, falgyl  -- Cultures sent, repeat cultures sent 7/22, no growth to date, repeat cultures sent 7/26 --> gram negative rods on sputum cultures  -- BAL GNR --> pansensitive klebsiella   -- Repeat sputum cultures --> pansensitive klebsiella  -- ID consult   -- daily vanc levels  -- Line exchange 7/21  -- UA with many bacteria and WBCs, culture pending  -- CT without obvious source of infection, lungs with large amounts of atlectasis  -- Covid test negative      Heme/Onc:   -- H/H stable   -- Daily CBC  -- Received 18 pRBCs, 16 FFP, 2 plt, 25 cryo; 1500 albumin intra-op  -- Heparin gtt at 2000, do not titrate        Endo:   -- BG goal 140-180  -- SSI  -- Levothyroxine discontinued  -- hyperthyroid despite being hypothyroid at home  --  Endocrine consulted  -- Started on prednisone and methimazole       PPx:   Feeding: NPO, TPN  Analgesia/Sedation: Precedex/ PRN Dilaudid  Thromboembolic prevention: SCDs, heparin gtt  HOB >30: Yes  Stress Ulcer ppx: PPI  Glucose control: Critical care goal 140-180 g/dl, ISS     Lines/Drains/Airway: NG; RIJ CVC, r-radial a-line, sun; WV, VAD; midline      Dispo/Code Status/Palliative:   -- SICU / Full Code.

## 2022-08-01 NOTE — NURSING
MELANIE Davis MD notified of recent lab results including antiXa. Instructed to leave heparin gtt at current rate.

## 2022-08-01 NOTE — PROGRESS NOTES
[facility]  Adult Nutrition  Progress Note    SUMMARY       Recommendations    1) Resume tube feeds of Peptamen 1.5 with Prebio as medically feasible. Goal rate of 60 ml/hr to meet estimated kcal/protein needs - provides 2160 kcal, 98g protein, 1106mL water. Give Beneprotein QID to meet estimated protein needs; 1 packet = 25 kcal, 6g protein for a total of 2260 kcal/day, 122g protein/day.               -As pressor requirements increase, rec'd trickle feeds for best tolerance. Avoid holding TF for residuals less than 500mL unless associated with other s/s of intolerance such as N/V/D, abd pain/distention.      2) If PO/EN is not feasible, rec'd TPN for nutrition support. Goal macronutrients 120g aa, 300g dextrose, + IV SMOFlipids to provide 2000 kcal and 120g protein. GIR: 2.07 mg/kg/min.     3) RD to monitor and f/u.    Goals: Continue to meet % EEN/EPN by RD f/u date  Nutrition Goal Status: progressing towards goal  Communication of RD Recs: POC    Assessment and Plan  Nutrition Problem:  Moderate Protein-Calorie Malnutrition  Malnutrition in the context ofAcute Illness/Injury      Related to (etiology):  Inadequate energy intake, NPO, intubation      Signs and Symptoms (as evidenced by):  Energy Intake: <50% of estimated energy requirement x 5 days  Weight Loss: ~9% x <1 month  Fluid Accumulation: mild (1-2+ edema)     Interventions(treatment strategy):  Collaboration of nutrition care with other providers  Enteral nutrition      Nutrition Diagnosis Status:  Continues    Reason for Assessment    Reason For Assessment: RD follow-up  Diagnosis:  (LVAD complication)  Relevant Medical History: CHF, gout, cardiomyopathy  Interdisciplinary Rounds: did not attend    General Information Comments:   8/01: Pt was extubated 7/27, then re-intubated 7/29. Pt started on TPN on 7/29 d/t increase in pressors. TF held at this time.     7/25: Pt remains intubated/sedated, +OG tube in place, tolerating  "TF at 20 ml/hr. No N/V/D or abd pain noted. Per chart review, wt fluctuating between 253-265# x 4 months prior to admission. Pt weighing 245# on admit. #. Possible ~8# wt loss prior to admission, and 23# (9%) wt loss during LOS (suspect further weight loss masked by edema; 1 to 2+ edema noted at this time).  Pt meets criteria for moderate malnutrition in the context of acute illness/injury - see PES statement for details.     Nutrition Discharge Planning: cardiac/low sodium diet    Nutrition Risk Screen    Nutrition Risk Screen: tube feeding or parenteral nutrition    Nutrition/Diet History    Spiritual, Cultural Beliefs, Jehovah's witness Practices, Values that Affect Care: no  Food Allergies: NKFA    Anthropometrics    Temp: 100.2 °F (37.9 °C)  Height Method: Stated  Height: 6' 1" (185.4 cm)  Height (inches): 73 in  Weight Method: Bed Scale  Weight: 100.7 kg (222 lb)  Weight (lb): 222 lb  Ideal Body Weight (IBW), Male: 184 lb  % Ideal Body Weight, Male (lb): 125.54 %  BMI (Calculated): 29.3  BMI Grade: 25 - 29.9 - overweight    Lab/Procedures/Meds    Pertinent Labs Reviewed: reviewed  Pertinent Labs Comments: BUN 79, Crea 1.9, GFR 37.6, Glu 189, Alb 2  Pertinent Medications Reviewed: reviewed  Pertinent Medications Comments: lasix, pantoprazole, prednisone, amiodarone, dexmedetomidine, epinephrine, vasopressin    Estimated/Assessed Needs    Weight Used For Calorie Calculations: 100.7 kg (222 lb 0.1 oz)  Energy Calorie Requirements (kcal): 2172 kcal/day  Energy Need Method: [facility]  Protein Requirements: 121-151g pro/day (1.2-1.5 g/kg)  Weight Used For Protein Calculations: 100.7 kg (222 lb 0.1 oz)  Fluid Requirements (mL): 1 ml or fluid per MD  RDA Method (mL): 2172    Nutrition Prescription Ordered    Current Diet Order: NPO  Current Nutrition Support Formula Ordered:  (TPN: 80g aa, 250g dextrose + IV SMOFlipids)  Current Nutrition Support Rate Ordered: 40 (ml)  Current Nutrition Support Frequency Ordered: " ml/hr x 24hr    Evaluation of Received Nutrient/Fluid Intake    Parenteral Calories (kcal): 1670  Parenteral Protein (gm): 80  Parenteral Fluid (mL): 1008  Lipid Calories (kcals): 500 kcals  GIR (Glucose Infusion Rate) (mg/kg/min): 1.72 mg/kg/min  % Kcal Needs: 77%  % Protein Needs: 66%  I/O: +1L since 7/18  Energy Calories Required: not meeting needs  Protein Required: not meeting needs  Fluid Required: not meeting needs  Total Fluid Intake (mL/kg): -  Comments: LBM: 7/28  Tolerance: tolerating  % Intake of Estimated Energy Needs: 50 - 75 %  % Meal Intake: NPO    Nutrition Risk    Level of Risk/Frequency of Follow-up:  (1x/week)     Monitor and Evaluation    Food and Nutrient Intake: energy intake, food and beverage intake, enteral nutrition intake, parenteral nutrition intake  Food and Nutrient Adminstration: diet order, enteral and parenteral nutrition administration  Knowledge/Beliefs/Attitudes: food and nutrition knowledge/skill  Physical Activity and Function: nutrition-related ADLs and IADLs  Anthropometric Measurements: weight, weight change  Biochemical Data, Medical Tests and Procedures: electrolyte and renal panel, gastrointestinal profile, glucose/endocrine profile, inflammatory profile, lipid profile  Nutrition-Focused Physical Findings: overall appearance, extremities, muscles and bones     Nutrition Follow-Up    RD Follow-up?: Yes

## 2022-08-01 NOTE — PROGRESS NOTES
I have reviewed and concur with Dr. Rodriguez's history, physical, assessment, and plan.  I have personally interviewed and examined the patient.  See below addendum for my evaluation and additional findings.    fT4 trending down so will continue high dose prednisone and methimazole + cholestyramine.  Given normal total T3 will stop trending and continue monitoring fT4 daily.    Wendy Casper MD

## 2022-08-01 NOTE — SUBJECTIVE & OBJECTIVE
Interval History/Significant Events: NAEON. Lido drip dced. Weaning nitric. Still on epi and vaso .04.    Patient only on precedex.         Follow-up For: Procedure(s) (LRB):  REMOVAL, FOREIGN BODY (N/A)    Post-Operative Day: 10 Days Post-Op    Objective:     Vital Signs (Most Recent):  Temp: 100.2 °F (37.9 °C) (08/01/22 1134)  Pulse: 71 (08/01/22 1134)  Resp: (!) 24 (08/01/22 0806)  BP: (!) 76/0 (08/01/22 1105)  SpO2: 100 % (08/01/22 0508)   Vital Signs (24h Range):  Temp:  [99.32 °F (37.4 °C)-101.48 °F (38.6 °C)] 100.2 °F (37.9 °C)  Pulse:  [62-91] 71  Resp:  [20-25] 24  SpO2:  [95 %-100 %] 100 %  BP: (68-78)/(0) 76/0  Arterial Line BP: (69-82)/(55-70) 81/69     Weight: 100.7 kg (222 lb)  Body mass index is 29.29 kg/m².      Intake/Output Summary (Last 24 hours) at 8/1/2022 1250  Last data filed at 8/1/2022 1200  Gross per 24 hour   Intake 3167.34 ml   Output 5710 ml   Net -2542.66 ml       Physical Exam  Constitutional:       Comments: Intubated and sedated   HENT:      Head: Normocephalic and atraumatic.      Nose:      Comments: NG in place  Eyes:      Pupils: Pupils are equal, round, and reactive to light.   Neck:      Comments: R IJ CVC    Cardiovascular:      Rate and Rhythm: Normal rate. Rhythm irregular.      Comments: LVAD in place -- 6400 rpm, 5.0L    Midline sternotomy c/d with dressing in place      Pulmonary:      Comments: Intubated      Abdominal:      General: There is no distension.      Palpations: Abdomen is soft.      Comments: Prior driveline site packed with iodoform gauze   Genitourinary:     Comments: Lagunas in place draining clear urine  Musculoskeletal:      Comments: ECMO cannula sites with dressing in place.     Cutdown incision with seroma with wound vac in place, serous output. Changed yesterday    right radial arterial line   Skin:     General: Skin is warm and dry.   Neurological:      Comments: Sedated       Vents:  Vent Mode: A/C (08/01/22 1134)  Ventilator Initiated: Yes  (07/29/22 0002)  Set Rate: 24 BPM (08/01/22 1134)  Vt Set: 550 mL (08/01/22 1134)  Pressure Support: 8 cmH20 (07/31/22 2043)  PEEP/CPAP: 8 cmH20 (08/01/22 1134)  Oxygen Concentration (%): 60 (08/01/22 1134)  Peak Airway Pressure: 32 cmH2O (08/01/22 1134)  Plateau Pressure: 30 cmH20 (08/01/22 1134)  Total Ve: 13.4 mL (08/01/22 1134)  Negative Inspiratory Force (cm H2O): 0 (08/01/22 1134)  F/VT Ratio<105 (RSBI): (!) 47.81 (08/01/22 0806)    Lines/Drains/Airways       Central Venous Catheter Line  Duration             Percutaneous Central Line Insertion/Assessment - Quad Lumen  07/21/22 1515 right internal jugular 10 days              Drain  Duration                  NG/OG Tube 07/15/22 1030 Left nostril 17 days         Urethral Catheter 07/27/22 1536 Temperature probe 16 Fr. 4 days              Airway  Duration                  Airway - Non-Surgical 07/29/22 0042 3 days              Arterial Line  Duration             Arterial Line 07/30/22 0900 Right Radial 2 days              Line  Duration                  VAD 07/13/22 1300 Left ventricular assist device HeartMate 3 18 days              Peripheral Intravenous Line  Duration                  Midline Catheter Insertion/Assessment  - Single Lumen 07/21/22 1616 Left basilic vein (medial side of arm) 18g x 10cm 10 days         Peripheral IV - Single Lumen 07/30/22 0648 22 G Posterior;Right Hand 2 days                    Significant Labs:    CBC/Anemia Profile:  Recent Labs   Lab 07/31/22 2027 08/01/22 0314 08/01/22 0314 08/01/22  1210   WBC 15.14* 15.04*  --  15.22*   HGB 7.3* 7.3*  --  7.2*   HCT 23.4* 23.6* 25* 23.6*    389  --  354   MCV 92 91  --  95   RDW 21.5* 21.1*  --  21.2*        Chemistries:  Recent Labs   Lab 07/31/22  1153 07/31/22 2027 08/01/22  0314    138 139   K 4.8 4.8 4.1    107 105   CO2 21* 20* 22*   BUN 84* 83* 85*   CREATININE 2.3* 2.2* 2.1*   CALCIUM 9.2 9.2 9.4   ALBUMIN 1.9* 1.9* 2.0*   PROT 6.8 6.7 6.9   BILITOT 6.2*  5.7* 6.2*   ALKPHOS 91 88 89   ALT 30 29 30   AST 37 33 36   MG 2.2 2.1 2.5   PHOS 3.9 3.6 3.3       CMP:   Recent Labs   Lab 07/31/22  0402 07/31/22  1153 07/31/22 2027 08/01/22  0314    139 138 139   K 4.0 4.8 4.8 4.1    106 107 105   CO2 20* 21* 20* 22*   * 194* 156* 166*   BUN 83* 84* 83* 85*   CREATININE 2.5* 2.3* 2.2* 2.1*   CALCIUM 9.1 9.2 9.2 9.4   PROT 6.5 6.8 6.7 6.9   ALBUMIN 1.9* 1.9* 1.9* 2.0*   BILITOT 6.3* 6.2* 5.7* 6.2*   ALKPHOS 93 91 88 89   AST 34 37 33 36   ALT 31 30 29 30   ANIONGAP 12 12 11 12   EGFRNONAA 27.9* 30.8* 32.5*  --        Significant Imaging:  I have reviewed all pertinent imaging results/findings within the past 24 hours.

## 2022-08-02 LAB
ALBUMIN SERPL BCP-MCNC: 2 G/DL (ref 3.5–5.2)
ALLENS TEST: ABNORMAL
ALLENS TEST: ABNORMAL
ALP SERPL-CCNC: 89 U/L (ref 55–135)
ALP SERPL-CCNC: 91 U/L (ref 55–135)
ALP SERPL-CCNC: 93 U/L (ref 55–135)
ALT SERPL W/O P-5'-P-CCNC: 30 U/L (ref 10–44)
ALT SERPL W/O P-5'-P-CCNC: 31 U/L (ref 10–44)
ALT SERPL W/O P-5'-P-CCNC: 31 U/L (ref 10–44)
ANION GAP SERPL CALC-SCNC: 10 MMOL/L (ref 8–16)
ANION GAP SERPL CALC-SCNC: 13 MMOL/L (ref 8–16)
ANION GAP SERPL CALC-SCNC: 9 MMOL/L (ref 8–16)
APTT BLDCRRT: 59.3 SEC (ref 21–32)
AST SERPL-CCNC: 33 U/L (ref 10–40)
AST SERPL-CCNC: 33 U/L (ref 10–40)
AST SERPL-CCNC: 37 U/L (ref 10–40)
BACTERIA SPEC ANAEROBE CULT: NORMAL
BASOPHILS # BLD AUTO: 0.01 K/UL (ref 0–0.2)
BASOPHILS NFR BLD: 0.1 % (ref 0–1.9)
BILIRUB SERPL-MCNC: 6.1 MG/DL (ref 0.1–1)
BILIRUB SERPL-MCNC: 6.3 MG/DL (ref 0.1–1)
BILIRUB SERPL-MCNC: 6.5 MG/DL (ref 0.1–1)
BUN SERPL-MCNC: 65 MG/DL (ref 6–20)
BUN SERPL-MCNC: 68 MG/DL (ref 6–20)
BUN SERPL-MCNC: 71 MG/DL (ref 6–20)
CALCIUM SERPL-MCNC: 8.8 MG/DL (ref 8.7–10.5)
CALCIUM SERPL-MCNC: 9.1 MG/DL (ref 8.7–10.5)
CALCIUM SERPL-MCNC: 9.5 MG/DL (ref 8.7–10.5)
CHLORIDE SERPL-SCNC: 106 MMOL/L (ref 95–110)
CHLORIDE SERPL-SCNC: 107 MMOL/L (ref 95–110)
CHLORIDE SERPL-SCNC: 112 MMOL/L (ref 95–110)
CO2 SERPL-SCNC: 21 MMOL/L (ref 23–29)
CO2 SERPL-SCNC: 21 MMOL/L (ref 23–29)
CO2 SERPL-SCNC: 24 MMOL/L (ref 23–29)
CREAT SERPL-MCNC: 1.5 MG/DL (ref 0.5–1.4)
CREAT SERPL-MCNC: 1.5 MG/DL (ref 0.5–1.4)
CREAT SERPL-MCNC: 1.6 MG/DL (ref 0.5–1.4)
DIFFERENTIAL METHOD: ABNORMAL
EOSINOPHIL # BLD AUTO: 0 K/UL (ref 0–0.5)
EOSINOPHIL NFR BLD: 0 % (ref 0–8)
EOSINOPHIL NFR BLD: 0.1 % (ref 0–8)
EOSINOPHIL NFR BLD: 0.1 % (ref 0–8)
ERYTHROCYTE [DISTWIDTH] IN BLOOD BY AUTOMATED COUNT: 20.5 % (ref 11.5–14.5)
ERYTHROCYTE [DISTWIDTH] IN BLOOD BY AUTOMATED COUNT: 20.6 % (ref 11.5–14.5)
ERYTHROCYTE [DISTWIDTH] IN BLOOD BY AUTOMATED COUNT: 20.8 % (ref 11.5–14.5)
EST. GFR  (NO RACE VARIABLE): 50.6 ML/MIN/1.73 M^2
EST. GFR  (NO RACE VARIABLE): 54.6 ML/MIN/1.73 M^2
EST. GFR  (NO RACE VARIABLE): 54.6 ML/MIN/1.73 M^2
FACT X PPP CHRO-ACNC: 0.19 IU/ML (ref 0.3–0.7)
FACT X PPP CHRO-ACNC: 0.27 IU/ML (ref 0.3–0.7)
FACT X PPP CHRO-ACNC: 0.27 IU/ML (ref 0.3–0.7)
FACT X PPP CHRO-ACNC: 0.52 IU/ML (ref 0.3–0.7)
FIBRINOGEN PPP-MCNC: 706 MG/DL (ref 182–400)
GLUCOSE SERPL-MCNC: 194 MG/DL (ref 70–110)
GLUCOSE SERPL-MCNC: 201 MG/DL (ref 70–110)
GLUCOSE SERPL-MCNC: 213 MG/DL (ref 70–110)
HCO3 UR-SCNC: 23.5 MMOL/L (ref 24–28)
HCT VFR BLD AUTO: 23.4 % (ref 40–54)
HCT VFR BLD AUTO: 24.3 % (ref 40–54)
HCT VFR BLD AUTO: 24.4 % (ref 40–54)
HCT VFR BLD CALC: 28 %PCV (ref 36–54)
HGB BLD-MCNC: 7 G/DL (ref 14–18)
HGB BLD-MCNC: 7.3 G/DL (ref 14–18)
HGB BLD-MCNC: 7.3 G/DL (ref 14–18)
IMM GRANULOCYTES # BLD AUTO: 0.17 K/UL (ref 0–0.04)
IMM GRANULOCYTES # BLD AUTO: 0.17 K/UL (ref 0–0.04)
IMM GRANULOCYTES # BLD AUTO: 0.19 K/UL (ref 0–0.04)
IMM GRANULOCYTES NFR BLD AUTO: 0.9 % (ref 0–0.5)
IMM GRANULOCYTES NFR BLD AUTO: 0.9 % (ref 0–0.5)
IMM GRANULOCYTES NFR BLD AUTO: 1 % (ref 0–0.5)
INR PPP: 1.2 (ref 0.8–1.2)
INR PPP: 1.3 (ref 0.8–1.2)
INR PPP: 1.3 (ref 0.8–1.2)
LDH SERPL L TO P-CCNC: 1.32 MMOL/L (ref 0.36–1.25)
LYMPHOCYTES # BLD AUTO: 0.3 K/UL (ref 1–4.8)
LYMPHOCYTES # BLD AUTO: 0.3 K/UL (ref 1–4.8)
LYMPHOCYTES # BLD AUTO: 0.5 K/UL (ref 1–4.8)
LYMPHOCYTES NFR BLD: 1.3 % (ref 18–48)
LYMPHOCYTES NFR BLD: 1.8 % (ref 18–48)
LYMPHOCYTES NFR BLD: 2.6 % (ref 18–48)
MAGNESIUM SERPL-MCNC: 2.1 MG/DL (ref 1.6–2.6)
MAGNESIUM SERPL-MCNC: 2.5 MG/DL (ref 1.6–2.6)
MAGNESIUM SERPL-MCNC: 2.6 MG/DL (ref 1.6–2.6)
MCH RBC QN AUTO: 27.3 PG (ref 27–31)
MCH RBC QN AUTO: 28.3 PG (ref 27–31)
MCH RBC QN AUTO: 28.5 PG (ref 27–31)
MCHC RBC AUTO-ENTMCNC: 29.9 G/DL (ref 32–36)
MCHC RBC AUTO-ENTMCNC: 29.9 G/DL (ref 32–36)
MCHC RBC AUTO-ENTMCNC: 30 G/DL (ref 32–36)
MCV RBC AUTO: 91 FL (ref 82–98)
MCV RBC AUTO: 94 FL (ref 82–98)
MCV RBC AUTO: 95 FL (ref 82–98)
METHEMOGLOBIN: 0.3 % (ref 0–3)
MONOCYTES # BLD AUTO: 1.2 K/UL (ref 0.3–1)
MONOCYTES # BLD AUTO: 1.3 K/UL (ref 0.3–1)
MONOCYTES # BLD AUTO: 1.5 K/UL (ref 0.3–1)
MONOCYTES NFR BLD: 6.3 % (ref 4–15)
MONOCYTES NFR BLD: 6.6 % (ref 4–15)
MONOCYTES NFR BLD: 7.6 % (ref 4–15)
NEUTROPHILS # BLD AUTO: 16.8 K/UL (ref 1.8–7.7)
NEUTROPHILS # BLD AUTO: 17.6 K/UL (ref 1.8–7.7)
NEUTROPHILS # BLD AUTO: 17.8 K/UL (ref 1.8–7.7)
NEUTROPHILS NFR BLD: 88.6 % (ref 38–73)
NEUTROPHILS NFR BLD: 90.9 % (ref 38–73)
NEUTROPHILS NFR BLD: 91 % (ref 38–73)
NRBC BLD-RTO: 0 /100 WBC
PCO2 BLDA: 34 MMHG (ref 35–45)
PH SMN: 7.45 [PH] (ref 7.35–7.45)
PHOSPHATE SERPL-MCNC: 2.3 MG/DL (ref 2.7–4.5)
PHOSPHATE SERPL-MCNC: 2.8 MG/DL (ref 2.7–4.5)
PHOSPHATE SERPL-MCNC: 2.9 MG/DL (ref 2.7–4.5)
PLATELET # BLD AUTO: 339 K/UL (ref 150–450)
PLATELET # BLD AUTO: 340 K/UL (ref 150–450)
PLATELET # BLD AUTO: 345 K/UL (ref 150–450)
PMV BLD AUTO: 11.3 FL (ref 9.2–12.9)
PMV BLD AUTO: 11.3 FL (ref 9.2–12.9)
PMV BLD AUTO: 11.7 FL (ref 9.2–12.9)
PO2 BLDA: 186 MMHG (ref 80–100)
POC BE: -1 MMOL/L
POC IONIZED CALCIUM: 1.25 MMOL/L (ref 1.06–1.42)
POC SATURATED O2: 100 % (ref 95–100)
POC TCO2: 25 MMOL/L (ref 23–27)
POCT GLUCOSE: 189 MG/DL (ref 70–110)
POCT GLUCOSE: 191 MG/DL (ref 70–110)
POCT GLUCOSE: 202 MG/DL (ref 70–110)
POCT GLUCOSE: 206 MG/DL (ref 70–110)
POCT GLUCOSE: 231 MG/DL (ref 70–110)
POTASSIUM BLD-SCNC: 3.3 MMOL/L (ref 3.5–5.1)
POTASSIUM SERPL-SCNC: 3.5 MMOL/L (ref 3.5–5.1)
POTASSIUM SERPL-SCNC: 4.3 MMOL/L (ref 3.5–5.1)
POTASSIUM SERPL-SCNC: 4.5 MMOL/L (ref 3.5–5.1)
PROT SERPL-MCNC: 6.2 G/DL (ref 6–8.4)
PROT SERPL-MCNC: 7 G/DL (ref 6–8.4)
PROT SERPL-MCNC: 7.1 G/DL (ref 6–8.4)
PROTHROMBIN TIME: 12.8 SEC (ref 9–12.5)
PROTHROMBIN TIME: 12.9 SEC (ref 9–12.5)
PROTHROMBIN TIME: 13.2 SEC (ref 9–12.5)
RBC # BLD AUTO: 2.56 M/UL (ref 4.6–6.2)
RBC # BLD AUTO: 2.56 M/UL (ref 4.6–6.2)
RBC # BLD AUTO: 2.58 M/UL (ref 4.6–6.2)
SAMPLE: ABNORMAL
SAMPLE: ABNORMAL
SITE: ABNORMAL
SITE: ABNORMAL
SODIUM BLD-SCNC: 143 MMOL/L (ref 136–145)
SODIUM SERPL-SCNC: 140 MMOL/L (ref 136–145)
SODIUM SERPL-SCNC: 140 MMOL/L (ref 136–145)
SODIUM SERPL-SCNC: 143 MMOL/L (ref 136–145)
T4 FREE SERPL-MCNC: 2.59 NG/DL (ref 0.71–1.51)
WBC # BLD AUTO: 18.98 K/UL (ref 3.9–12.7)
WBC # BLD AUTO: 19.35 K/UL (ref 3.9–12.7)
WBC # BLD AUTO: 19.51 K/UL (ref 3.9–12.7)

## 2022-08-02 PROCEDURE — 83050 HGB METHEMOGLOBIN QUAN: CPT

## 2022-08-02 PROCEDURE — 63600175 PHARM REV CODE 636 W HCPCS

## 2022-08-02 PROCEDURE — 80053 COMPREHEN METABOLIC PANEL: CPT | Mod: 91 | Performed by: THORACIC SURGERY (CARDIOTHORACIC VASCULAR SURGERY)

## 2022-08-02 PROCEDURE — 25000003 PHARM REV CODE 250: Performed by: THORACIC SURGERY (CARDIOTHORACIC VASCULAR SURGERY)

## 2022-08-02 PROCEDURE — 84132 ASSAY OF SERUM POTASSIUM: CPT

## 2022-08-02 PROCEDURE — 25000003 PHARM REV CODE 250

## 2022-08-02 PROCEDURE — C9113 INJ PANTOPRAZOLE SODIUM, VIA: HCPCS

## 2022-08-02 PROCEDURE — 82803 BLOOD GASES ANY COMBINATION: CPT

## 2022-08-02 PROCEDURE — 37799 UNLISTED PX VASCULAR SURGERY: CPT

## 2022-08-02 PROCEDURE — A4217 STERILE WATER/SALINE, 500 ML: HCPCS

## 2022-08-02 PROCEDURE — 25000242 PHARM REV CODE 250 ALT 637 W/ HCPCS: Performed by: THORACIC SURGERY (CARDIOTHORACIC VASCULAR SURGERY)

## 2022-08-02 PROCEDURE — 99900026 HC AIRWAY MAINTENANCE (STAT)

## 2022-08-02 PROCEDURE — 99231 PR SUBSEQUENT HOSPITAL CARE,LEVL I: ICD-10-PCS | Mod: ,,, | Performed by: INTERNAL MEDICINE

## 2022-08-02 PROCEDURE — 20000000 HC ICU ROOM

## 2022-08-02 PROCEDURE — 97110 THERAPEUTIC EXERCISES: CPT

## 2022-08-02 PROCEDURE — 85610 PROTHROMBIN TIME: CPT | Performed by: THORACIC SURGERY (CARDIOTHORACIC VASCULAR SURGERY)

## 2022-08-02 PROCEDURE — 82330 ASSAY OF CALCIUM: CPT

## 2022-08-02 PROCEDURE — 85384 FIBRINOGEN ACTIVITY: CPT | Performed by: THORACIC SURGERY (CARDIOTHORACIC VASCULAR SURGERY)

## 2022-08-02 PROCEDURE — 83735 ASSAY OF MAGNESIUM: CPT | Mod: 91 | Performed by: THORACIC SURGERY (CARDIOTHORACIC VASCULAR SURGERY)

## 2022-08-02 PROCEDURE — 99900035 HC TECH TIME PER 15 MIN (STAT)

## 2022-08-02 PROCEDURE — 82800 BLOOD PH: CPT

## 2022-08-02 PROCEDURE — 99291 PR CRITICAL CARE, E/M 30-74 MINUTES: ICD-10-PCS | Mod: ,,, | Performed by: ANESTHESIOLOGY

## 2022-08-02 PROCEDURE — 83605 ASSAY OF LACTIC ACID: CPT

## 2022-08-02 PROCEDURE — 27000248 HC VAD-ADDITIONAL DAY

## 2022-08-02 PROCEDURE — 94003 VENT MGMT INPAT SUBQ DAY: CPT

## 2022-08-02 PROCEDURE — 85025 COMPLETE CBC W/AUTO DIFF WBC: CPT | Mod: 91 | Performed by: THORACIC SURGERY (CARDIOTHORACIC VASCULAR SURGERY)

## 2022-08-02 PROCEDURE — 85730 THROMBOPLASTIN TIME PARTIAL: CPT | Performed by: THORACIC SURGERY (CARDIOTHORACIC VASCULAR SURGERY)

## 2022-08-02 PROCEDURE — 25000003 PHARM REV CODE 250: Performed by: STUDENT IN AN ORGANIZED HEALTH CARE EDUCATION/TRAINING PROGRAM

## 2022-08-02 PROCEDURE — 82565 ASSAY OF CREATININE: CPT

## 2022-08-02 PROCEDURE — 84100 ASSAY OF PHOSPHORUS: CPT | Mod: 91 | Performed by: THORACIC SURGERY (CARDIOTHORACIC VASCULAR SURGERY)

## 2022-08-02 PROCEDURE — 94640 AIRWAY INHALATION TREATMENT: CPT

## 2022-08-02 PROCEDURE — 99231 SBSQ HOSP IP/OBS SF/LOW 25: CPT | Mod: ,,, | Performed by: INTERNAL MEDICINE

## 2022-08-02 PROCEDURE — 99291 CRITICAL CARE FIRST HOUR: CPT | Mod: ,,, | Performed by: ANESTHESIOLOGY

## 2022-08-02 PROCEDURE — B4185 PARENTERAL SOL 10 GM LIPIDS: HCPCS

## 2022-08-02 PROCEDURE — 84295 ASSAY OF SERUM SODIUM: CPT

## 2022-08-02 PROCEDURE — 27000644 HC VENT IN-LINE ADAPTER

## 2022-08-02 PROCEDURE — 25000242 PHARM REV CODE 250 ALT 637 W/ HCPCS

## 2022-08-02 PROCEDURE — 25000003 PHARM REV CODE 250: Performed by: PHYSICIAN ASSISTANT

## 2022-08-02 PROCEDURE — 63600175 PHARM REV CODE 636 W HCPCS: Performed by: STUDENT IN AN ORGANIZED HEALTH CARE EDUCATION/TRAINING PROGRAM

## 2022-08-02 PROCEDURE — 94761 N-INVAS EAR/PLS OXIMETRY MLT: CPT

## 2022-08-02 PROCEDURE — 85014 HEMATOCRIT: CPT

## 2022-08-02 PROCEDURE — 27100171 HC OXYGEN HIGH FLOW UP TO 24 HOURS

## 2022-08-02 PROCEDURE — 84439 ASSAY OF FREE THYROXINE: CPT | Performed by: STUDENT IN AN ORGANIZED HEALTH CARE EDUCATION/TRAINING PROGRAM

## 2022-08-02 PROCEDURE — 85520 HEPARIN ASSAY: CPT | Performed by: THORACIC SURGERY (CARDIOTHORACIC VASCULAR SURGERY)

## 2022-08-02 PROCEDURE — 63600367 HC NITRIC OXIDE PER HOUR

## 2022-08-02 RX ORDER — POTASSIUM CHLORIDE 14.9 MG/ML
20 INJECTION INTRAVENOUS ONCE
Status: COMPLETED | OUTPATIENT
Start: 2022-08-02 | End: 2022-08-02

## 2022-08-02 RX ORDER — TALC
6 POWDER (GRAM) TOPICAL ONCE
Status: DISCONTINUED | OUTPATIENT
Start: 2022-08-02 | End: 2022-08-02

## 2022-08-02 RX ORDER — POLYETHYLENE GLYCOL 3350, SODIUM SULFATE ANHYDROUS, SODIUM BICARBONATE, SODIUM CHLORIDE, POTASSIUM CHLORIDE 236; 22.74; 6.74; 5.86; 2.97 G/4L; G/4L; G/4L; G/4L; G/4L
4000 POWDER, FOR SOLUTION ORAL ONCE
Status: COMPLETED | OUTPATIENT
Start: 2022-08-02 | End: 2022-08-02

## 2022-08-02 RX ORDER — INSULIN ASPART 100 [IU]/ML
3 INJECTION, SOLUTION INTRAVENOUS; SUBCUTANEOUS EVERY 4 HOURS
Status: DISCONTINUED | OUTPATIENT
Start: 2022-08-02 | End: 2022-08-03

## 2022-08-02 RX ORDER — POLYETHYLENE GLYCOL 3350 17 G/17G
17 POWDER, FOR SOLUTION ORAL 2 TIMES DAILY
Status: DISCONTINUED | OUTPATIENT
Start: 2022-08-02 | End: 2022-08-06

## 2022-08-02 RX ORDER — POTASSIUM CHLORIDE 29.8 MG/ML
40 INJECTION INTRAVENOUS ONCE
Status: COMPLETED | OUTPATIENT
Start: 2022-08-02 | End: 2022-08-02

## 2022-08-02 RX ORDER — MAGNESIUM SULFATE HEPTAHYDRATE 40 MG/ML
2 INJECTION, SOLUTION INTRAVENOUS ONCE
Status: COMPLETED | OUTPATIENT
Start: 2022-08-02 | End: 2022-08-02

## 2022-08-02 RX ORDER — TALC
6 POWDER (GRAM) TOPICAL NIGHTLY PRN
Status: DISCONTINUED | OUTPATIENT
Start: 2022-08-02 | End: 2022-08-23

## 2022-08-02 RX ADMIN — HEPARIN SODIUM 2100 UNITS/HR: 5000 INJECTION INTRAVENOUS; SUBCUTANEOUS at 05:08

## 2022-08-02 RX ADMIN — METRONIDAZOLE 500 MG: 500 TABLET ORAL at 05:08

## 2022-08-02 RX ADMIN — POTASSIUM PHOSPHATE, MONOBASIC AND POTASSIUM PHOSPHATE, DIBASIC 20 MMOL: 224; 236 INJECTION, SOLUTION, CONCENTRATE INTRAVENOUS at 05:08

## 2022-08-02 RX ADMIN — POLYETHYLENE GLYCOL 3350 17 G: 17 POWDER, FOR SOLUTION ORAL at 08:08

## 2022-08-02 RX ADMIN — METHIMAZOLE 20 MG: 10 TABLET ORAL at 08:08

## 2022-08-02 RX ADMIN — MIDODRINE HYDROCHLORIDE 15 MG: 5 TABLET ORAL at 01:08

## 2022-08-02 RX ADMIN — ACETYLCYSTEINE 4 ML: 100 SOLUTION ORAL; RESPIRATORY (INHALATION) at 07:08

## 2022-08-02 RX ADMIN — HEPARIN SODIUM 2200 UNITS/HR: 5000 INJECTION INTRAVENOUS; SUBCUTANEOUS at 04:08

## 2022-08-02 RX ADMIN — DEXMEDETOMIDINE HYDROCHLORIDE 0.6 MCG/KG/HR: 4 INJECTION INTRAVENOUS at 12:08

## 2022-08-02 RX ADMIN — CEFEPIME 2 G: 2 INJECTION, POWDER, FOR SOLUTION INTRAVENOUS at 10:08

## 2022-08-02 RX ADMIN — INSULIN ASPART 2 UNITS: 100 INJECTION, SOLUTION INTRAVENOUS; SUBCUTANEOUS at 03:08

## 2022-08-02 RX ADMIN — MEXILETINE HYDROCHLORIDE 400 MG: 200 CAPSULE ORAL at 09:08

## 2022-08-02 RX ADMIN — INSULIN ASPART 3 UNITS: 100 INJECTION, SOLUTION INTRAVENOUS; SUBCUTANEOUS at 12:08

## 2022-08-02 RX ADMIN — MAGNESIUM SULFATE 2 G: 2 INJECTION INTRAVENOUS at 05:08

## 2022-08-02 RX ADMIN — SMOFLIPID 250 ML: 6; 6; 5; 3 INJECTION, EMULSION INTRAVENOUS at 10:08

## 2022-08-02 RX ADMIN — LEVALBUTEROL HYDROCHLORIDE 0.63 MG: 0.63 SOLUTION RESPIRATORY (INHALATION) at 07:08

## 2022-08-02 RX ADMIN — METRONIDAZOLE 500 MG: 500 TABLET ORAL at 01:08

## 2022-08-02 RX ADMIN — HYDROXYZINE HYDROCHLORIDE 25 MG: 25 TABLET ORAL at 09:08

## 2022-08-02 RX ADMIN — LEVALBUTEROL HYDROCHLORIDE 0.63 MG: 0.63 SOLUTION RESPIRATORY (INHALATION) at 12:08

## 2022-08-02 RX ADMIN — GUAIFENESIN 300 MG: 100 SOLUTION ORAL at 05:08

## 2022-08-02 RX ADMIN — CHOLESTYRAMINE 4 G: 4 POWDER, FOR SUSPENSION ORAL at 05:08

## 2022-08-02 RX ADMIN — POLYETHYLENE GLYCOL 3350 17 G: 17 POWDER, FOR SOLUTION ORAL at 09:08

## 2022-08-02 RX ADMIN — CHOLESTYRAMINE 4 G: 4 POWDER, FOR SUSPENSION ORAL at 09:08

## 2022-08-02 RX ADMIN — AMIODARONE HYDROCHLORIDE 400 MG: 200 TABLET ORAL at 02:08

## 2022-08-02 RX ADMIN — MEXILETINE HYDROCHLORIDE 400 MG: 200 CAPSULE ORAL at 01:08

## 2022-08-02 RX ADMIN — ASPIRIN 81 MG CHEWABLE TABLET 81 MG: 81 TABLET CHEWABLE at 08:08

## 2022-08-02 RX ADMIN — METHIMAZOLE 20 MG: 10 TABLET ORAL at 09:08

## 2022-08-02 RX ADMIN — MIDODRINE HYDROCHLORIDE 15 MG: 5 TABLET ORAL at 05:08

## 2022-08-02 RX ADMIN — GUAIFENESIN 300 MG: 100 SOLUTION ORAL at 12:08

## 2022-08-02 RX ADMIN — CEFEPIME 2 G: 2 INJECTION, POWDER, FOR SOLUTION INTRAVENOUS at 09:08

## 2022-08-02 RX ADMIN — Medication 0.04 MCG/KG/MIN: at 12:08

## 2022-08-02 RX ADMIN — PANTOPRAZOLE SODIUM 40 MG: 40 INJECTION, POWDER, FOR SOLUTION INTRAVENOUS at 08:08

## 2022-08-02 RX ADMIN — AMIODARONE HYDROCHLORIDE 400 MG: 200 TABLET ORAL at 09:08

## 2022-08-02 RX ADMIN — INSULIN ASPART 2 UNITS: 100 INJECTION, SOLUTION INTRAVENOUS; SUBCUTANEOUS at 12:08

## 2022-08-02 RX ADMIN — HYDROMORPHONE HYDROCHLORIDE 1 MG: 1 INJECTION, SOLUTION INTRAMUSCULAR; INTRAVENOUS; SUBCUTANEOUS at 03:08

## 2022-08-02 RX ADMIN — INSULIN ASPART 2 UNITS: 100 INJECTION, SOLUTION INTRAVENOUS; SUBCUTANEOUS at 08:08

## 2022-08-02 RX ADMIN — PANTOPRAZOLE SODIUM 40 MG: 40 INJECTION, POWDER, FOR SOLUTION INTRAVENOUS at 09:08

## 2022-08-02 RX ADMIN — POTASSIUM CHLORIDE 20 MEQ: 200 INJECTION, SOLUTION INTRAVENOUS at 08:08

## 2022-08-02 RX ADMIN — PREDNISONE 40 MG: 20 TABLET ORAL at 08:08

## 2022-08-02 RX ADMIN — INSULIN ASPART 3 UNITS: 100 INJECTION, SOLUTION INTRAVENOUS; SUBCUTANEOUS at 04:08

## 2022-08-02 RX ADMIN — MIDODRINE HYDROCHLORIDE 15 MG: 5 TABLET ORAL at 09:08

## 2022-08-02 RX ADMIN — INSULIN ASPART 3 UNITS: 100 INJECTION, SOLUTION INTRAVENOUS; SUBCUTANEOUS at 07:08

## 2022-08-02 RX ADMIN — POTASSIUM PHOSPHATE, MONOBASIC AND POTASSIUM PHOSPHATE, DIBASIC: 224; 236 INJECTION, SOLUTION INTRAVENOUS at 10:08

## 2022-08-02 RX ADMIN — POTASSIUM CHLORIDE 40 MEQ: 29.8 INJECTION, SOLUTION INTRAVENOUS at 05:08

## 2022-08-02 RX ADMIN — AMIODARONE HYDROCHLORIDE 400 MG: 200 TABLET ORAL at 08:08

## 2022-08-02 RX ADMIN — ACETAMINOPHEN 999.01 MG: 160 SOLUTION ORAL at 04:08

## 2022-08-02 RX ADMIN — METRONIDAZOLE 500 MG: 500 TABLET ORAL at 10:08

## 2022-08-02 RX ADMIN — CHOLESTYRAMINE 4 G: 4 POWDER, FOR SUSPENSION ORAL at 08:08

## 2022-08-02 RX ADMIN — INSULIN ASPART 2 UNITS: 100 INJECTION, SOLUTION INTRAVENOUS; SUBCUTANEOUS at 04:08

## 2022-08-02 RX ADMIN — CHOLESTYRAMINE 4 G: 4 POWDER, FOR SUSPENSION ORAL at 12:08

## 2022-08-02 RX ADMIN — ACETYLCYSTEINE 4 ML: 100 SOLUTION ORAL; RESPIRATORY (INHALATION) at 12:08

## 2022-08-02 RX ADMIN — POLYETHYLENE GLYCOL 3350, SODIUM SULFATE ANHYDROUS, SODIUM BICARBONATE, SODIUM CHLORIDE, POTASSIUM CHLORIDE 4000 ML: 236; 22.74; 6.74; 5.86; 2.97 POWDER, FOR SOLUTION ORAL at 01:08

## 2022-08-02 RX ADMIN — MEXILETINE HYDROCHLORIDE 400 MG: 200 CAPSULE ORAL at 05:08

## 2022-08-02 RX ADMIN — LEVALBUTEROL HYDROCHLORIDE 0.63 MG: 0.63 SOLUTION RESPIRATORY (INHALATION) at 01:08

## 2022-08-02 RX ADMIN — ACETYLCYSTEINE 4 ML: 100 SOLUTION ORAL; RESPIRATORY (INHALATION) at 01:08

## 2022-08-02 NOTE — ASSESSMENT & PLAN NOTE
Tim Richards is a 56 y.o. male HFrEF with an EF of 10% and a history of HM2 LVAD in 2018 who is now s/p HM3 upgrade, initiation of V-A ECMO after failure to wean off bypass x2      Neuro/Psych:   -- Sedation: precedex  -- Pain: PRN Dilaudid             Cards:   -- S/P LVAD exchange on 7/14/22  --Improving pressor requirements   Epi 0.04   Vaso 0.04  -- Continue Midodrine 15 mg Q8  -- MAP goal 75  -- VAD  flows stable  -- Decannulated on 7/19  -- Sternal closure on 7/15  -- Bedside drive line removal on 7/23  -- EP consult due to v-tach  -- restarted mexiletine. On PO amio      Pulm:   -- Goal O2 sat > 90%  -- ABG PRN  -- Nitric off 8/2  -- Extubated 7/26, re intubated 7/28  -- Monitor O2 sat with ABGs      Renal:  -- Keep sun for strict I/O  -- Trend BUN/Cr  -- Lasix gtt 2.5/hr     FEN / GI:   -- Replace lytes as needed  -- Nutrition: NPO,TPN  -- GI ppx: PPI  -- Bowel reg: miralax, docusate, mag citrate      ID:   -- Tm: afebrile night of 8/1  -- ABX cefepime, falgyl  -- Cultures sent, repeat cultures sent 7/22, no growth to date, repeat cultures sent 7/26 --> gram negative rods on sputum cultures  -- BAL GNR --> pansensitive klebsiella   -- Repeat sputum cultures --> pansensitive klebsiella  -- daily vanc levels  -- Covid test negative      Heme/Onc:   -- H/H stable   -- Daily CBC  -- Received 18 pRBCs, 16 FFP, 2 plt, 25 cryo; 1500 albumin intra-op  -- Heparin gtt at 2000, do not titrate        Endo:   -- BG goal 140-180  -- SSI  -- Levothyroxine discontinued  -- hyperthyroid despite being hypothyroid at home  -- Endocrine consulted  -- Started on prednisone and methimazole       PPx:   Feeding: NPO, TPN  Analgesia/Sedation: Precedex/ PRN Dilaudid  Thromboembolic prevention: SCDs, heparin gtt  HOB >30: Yes  Stress Ulcer ppx: PPI  Glucose control: Critical care goal 140-180 g/dl, ISS     Lines/Drains/Airway: NG; RIJ CVC, r-radial a-line, sun; [place], VAD; midline      Dispo/Code Status/Palliative:    -- SICU / Full Code.

## 2022-08-02 NOTE — PROGRESS NOTES
"[facility]  Endocrinology  Progress Note    Admit Date: 2022     56 year-old  male with NICM with LVAD, s/p ICD for VT on amiodarone and mexiletine and AIT followed by hypothyroidism initially admitted 2022 with COVID respiratory failure complicated with LVAD pump thrombosis that was refractory to medical therapy. Underwent LVAD pump exchange. Post-op course complicated by worsening cardiogenic shock/RV failure requiring VA-ECMO. Improved clinically underwent successful decannulation. Continued episodes of VT with ICD shock  and ongoing anti-arrhythmic mediation adjustments. TFTs on  significant for recurrent hyperthyroidism with undetectable TSH and elevated fT4.    - Patient re-intubated 2022    - Endocrinology consulted for recurrent AIT    Regarding AIT:    - Last seen outpatient by Dr. 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 He required a prolonged course of prednisone and methimazole, then subsequently developed hypothyroidism in May 2020. LT4 was adjusted based on serial TFT measurements. Most recently levothyroxine dose has been 112 mcg     - He self-decreased his amiodarone dose to 200 mg daily about 6 months ago. T4 was high on , but he was continued on levothyroxine 112 mcg    Lab Results   Component Value Date    TSH <0.010 (L) 2022    TSH 0.111 (L) 2022    TSH 4.079 (H) 2022    FREET4 2.59 (H) 2022    FREET4 2.71 (H) 2022    FREET4 3.02 (H) 2022       Interval HPI:   Overnight events:  No acute events reported overnight  Eatin%  Nausea: No  Hypoglycemia and intervention: No  Fever: No  TPN and/or TF: Yes  If yes, type of TF/TPN and rate:  Tube feeds at 40 mL/hr    BP (!) 82/0 (BP Location: Right arm)   Pulse 82   Temp 97.52 °F (36.4 °C) (Core Bladder)   Resp (!) 25   Ht 6' 1" (1.854 m)   Wt 101.6 kg (224 lb)   " SpO2 97%   BMI 29.55 kg/m²     Labs Reviewed and Include    Recent Labs   Lab 08/02/22  0325   *   CALCIUM 9.5   ALBUMIN 2.0*   PROT 7.1      K 3.5   CO2 21*      BUN 71*   CREATININE 1.6*   ALKPHOS 91   ALT 30   AST 33   BILITOT 6.3*     Lab Results   Component Value Date    WBC 18.98 (H) 08/02/2022    HGB 7.0 (L) 08/02/2022    HCT 28 (L) 08/02/2022    MCV 91 08/02/2022     08/02/2022     Recent Labs   Lab 07/27/22  0348 07/29/22  0218 08/01/22  0314 08/02/22  0325   TSH <0.010*  --   --   --    FREET4 2.45*   < > 2.71* 2.59*    < > = values in this interval not displayed.     Lab Results   Component Value Date    HGBA1C 5.4 04/26/2021       Nutritional status:   Body mass index is 29.55 kg/m².  Lab Results   Component Value Date    ALBUMIN 2.0 (L) 08/02/2022    ALBUMIN 1.9 (L) 08/01/2022    ALBUMIN 2.0 (L) 08/01/2022     Lab Results   Component Value Date    PREALBUMIN 13 (L) 07/29/2022    PREALBUMIN 11 (L) 07/17/2022    PREALBUMIN 10 (L) 07/15/2022       Estimated Creatinine Clearance: 65.4 mL/min (A) (based on SCr of 1.6 mg/dL (H)).    Accu-Checks  Recent Labs     07/31/22 2028 08/01/22  0012 08/01/22  0315 08/01/22  0808 08/01/22  1214 08/01/22  1533 08/01/22  1944 08/01/22  2348 08/02/22  0324 08/02/22  0721   POCTGLUCOSE 142* 159* 165* 138* 218* 215* 201* 195* 191* 231*       Current Medications and/or Treatments Impacting Glycemic Control  Immunotherapy:    Immunosuppressants       None          Steroids:   Hormones (From admission, onward)                Start     Stop Route Frequency Ordered    07/28/22 1545  predniSONE tablet 40 mg         -- PER NG TUBE Daily 07/28/22 1542    07/15/22 0900  vasopressin (PITRESSIN) 1 Units/mL in dextrose 5 % 100 mL infusion         -- IV Continuous 07/15/22 0900          Pressors:    Autonomic Drugs (From admission, onward)                Start     Stop Route Frequency Ordered    07/29/22 0011  EPINEPHrine (ADRENALIN) 1 mg/mL injection         Note to Pharmacy: Created by cabinet override    07/29 1214   07/29/22 0011    07/29/22 0007  EPINEPHrine 5 mg in dextrose 5% 250 mL infusion (premix)        Question Answer Comment   Begin at (in mcg/kg/min): 0.01    Titrate by: (in mcg/kg/min) 0.01    Titrate interval: (in minutes) 10    Titrate to maintain: (SBP or MAP or Cardiac Index) MAP    Greater than: (in mmHg) 65    Maximum dose: (in mcg/kg/min) 2        -- IV Continuous 07/29/22 0008    07/23/22 1400  midodrine tablet 15 mg         -- PER NG TUBE Every 8 hours 07/23/22 0944    07/15/22 0900  NORepinephrine 8 mg in dextrose 5% 250 mL infusion        Question Answer Comment   Begin at (in mcg/kg/min): 0.1    Titrate by: (in mcg/kg/min) 0.02    Titrate interval: (in minutes) 20    Titrate to maintain: (MAP or SBP) MAP    Greater than: (in mmHg) 65    Maximum dose: (in mcg/kg/min) 0.2        -- IV Continuous 07/15/22 0900          Hyperglycemia/Diabetes Medications:   Antihyperglycemics (From admission, onward)                Start     Stop Route Frequency Ordered    08/01/22 2000  insulin aspart U-100 pen 2 Units         -- SubQ Every 4 hours 08/01/22 1823    07/30/22 1023  insulin aspart U-100 pen 0-5 Units         -- SubQ Every 4 hours PRN 07/30/22 0925            ASSESSMENT and PLAN    Amiodarone-induced hyperthyroidism    Key History and Diagnostic Findings  - History of AIT type 2 (TRAb and TSI negative; Thyroid US unremarkable with low vascularity)  - Weaned off methimazole and prednisone by May 2020, then developed hypothyroidism, LT4 started and dose titrated per TFTs. Prior to current admission he was on LT4 112 mcg daily  - Labs consistent with hypothyroidism until current admission, initially on 7/13, with low TSH and elevated fT4. Repeat TFTs on 7/27 with worsening hyperthyroidism. He continued to receive LT4 112 mcg through 7/28. He remains on amiodarone for episodes of VT  - Suspect current hyperthyroidism is AIT, however continued exogenous  LT4 certainly contribute to current presentation   - Free T4 continues to decrease  Lab Results   Component Value Date    TSH <0.010 (L) 07/27/2022    TSH 0.111 (L) 07/13/2022    TSH 4.079 (H) 03/17/2022    FREET4 2.59 (H) 08/02/2022    FREET4 2.71 (H) 08/01/2022    FREET4 3.02 (H) 07/31/2022     - Noted plans for tracheostomy tube    Plan  - Although mechanism unknown, strongly suspect AIT (type 1 or 2); continuing empiric treatment for both  - Continue Methimazole 20mg BID  - Continue Prednisone 40mg daily  - Continue Cholestyramine 4g QID for now  - Continue to check daily free T4 until within normal range    amiodarone induced hypothyrodism  - Levothyroxine discontinued on 7/28/2022  - Please see plan as above    Hyperglycemia  Key History and Diagnostic Findings  - Hyperglycemia noted once starting tube feeds in addition to steroids  - No prior history of diabetes; most recent A1c of 5.4 on 04/26/2021    Plan  - Increase scheduled aspart from 2 up to 3 units q4h with low correction while on tube feeds  - Please notify endocrine if any change in tube feeds as this will change insulin requirements  - Please ensure that accuchecks are being documented q4h per order      Akira Zuñiga DO  [facility], [address]th [address]  [address] [place], [address]    Office: [phone]  Fax: [phone]    Disclaimer: This note has been generated using voice-recognition software. There may be typographical errors that have been missed during proof-reading.    The above history labs imaging impression and plan were discussed with attending physician who is in agreement and also took part in this patient's care.  I personally reviewed all of the patients available medications, labs, imaging, vitals, allergies, medical history.

## 2022-08-02 NOTE — SUBJECTIVE & OBJECTIVE
"Interval HPI:   Overnight events:  No acute events reported overnight  Eatin%  Nausea: No  Hypoglycemia and intervention: No  Fever: No  TPN and/or TF: Yes  If yes, type of TF/TPN and rate:  Tube feeds at 40 mL/hr    BP (!) 82/0 (BP Location: Right arm)   Pulse 82   Temp 97.52 °F (36.4 °C) (Core Bladder)   Resp (!) 25   Ht 6' 1" (1.854 m)   Wt 101.6 kg (224 lb)   SpO2 97%   BMI 29.55 kg/m²     Labs Reviewed and Include    Recent Labs   Lab 22  0325   *   CALCIUM 9.5   ALBUMIN 2.0*   PROT 7.1      K 3.5   CO2 21*      BUN 71*   CREATININE 1.6*   ALKPHOS 91   ALT 30   AST 33   BILITOT 6.3*     Lab Results   Component Value Date    WBC 18.98 (H) 2022    HGB 7.0 (L) 2022    HCT 28 (L) 2022    MCV 91 2022     2022     Recent Labs   Lab 22  0348 22  0218 22  0314 22  0325   TSH <0.010*  --   --   --    FREET4 2.45*   < > 2.71* 2.59*    < > = values in this interval not displayed.     Lab Results   Component Value Date    HGBA1C 5.4 2021       Nutritional status:   Body mass index is 29.55 kg/m².  Lab Results   Component Value Date    ALBUMIN 2.0 (L) 2022    ALBUMIN 1.9 (L) 2022    ALBUMIN 2.0 (L) 2022     Lab Results   Component Value Date    PREALBUMIN 13 (L) 2022    PREALBUMIN 11 (L) 2022    PREALBUMIN 10 (L) 07/15/2022       Estimated Creatinine Clearance: 65.4 mL/min (A) (based on SCr of 1.6 mg/dL (H)).    Accu-Checks  Recent Labs     22  0012 22  0315 22  0808 22  1214 22  1533 22  1944 22  2348 22  0324 22  0721   POCTGLUCOSE 142* 159* 165* 138* 218* 215* 201* 195* 191* 231*       Current Medications and/or Treatments Impacting Glycemic Control  Immunotherapy:    Immunosuppressants       None          Steroids:   Hormones (From admission, onward)                Start     Stop Route Frequency Ordered    22 " 1545  predniSONE tablet 40 mg         -- PER NG TUBE Daily 07/28/22 1542    07/15/22 0900  vasopressin (PITRESSIN) 1 Units/mL in dextrose 5 % 100 mL infusion         -- IV Continuous 07/15/22 0900          Pressors:    Autonomic Drugs (From admission, onward)                Start     Stop Route Frequency Ordered    07/29/22 0011  EPINEPHrine (ADRENALIN) 1 mg/mL injection        Note to Pharmacy: Created by cabinet override    07/29 1214   07/29/22 0011    07/29/22 0007  EPINEPHrine 5 mg in dextrose 5% 250 mL infusion (premix)        Question Answer Comment   Begin at (in mcg/kg/min): 0.01    Titrate by: (in mcg/kg/min) 0.01    Titrate interval: (in minutes) 10    Titrate to maintain: (SBP or MAP or Cardiac Index) MAP    Greater than: (in mmHg) 65    Maximum dose: (in mcg/kg/min) 2        -- IV Continuous 07/29/22 0008    07/23/22 1400  midodrine tablet 15 mg         -- PER NG TUBE Every 8 hours 07/23/22 0944    07/15/22 0900  NORepinephrine 8 mg in dextrose 5% 250 mL infusion        Question Answer Comment   Begin at (in mcg/kg/min): 0.1    Titrate by: (in mcg/kg/min) 0.02    Titrate interval: (in minutes) 20    Titrate to maintain: (MAP or SBP) MAP    Greater than: (in mmHg) 65    Maximum dose: (in mcg/kg/min) 0.2        -- IV Continuous 07/15/22 0900          Hyperglycemia/Diabetes Medications:   Antihyperglycemics (From admission, onward)                Start     Stop Route Frequency Ordered    08/01/22 2000  insulin aspart U-100 pen 2 Units         -- SubQ Every 4 hours 08/01/22 1823    07/30/22 1023  insulin aspart U-100 pen 0-5 Units         -- SubQ Every 4 hours PRN 07/30/22 0925

## 2022-08-02 NOTE — PT/OT/SLP PROGRESS
Cardiology progress note      History    Laurence Reynolds is a 72 year old female who presents to the office for follow up of her paroxysmal atrial fibrillation and hypertension.  Since her last visit on 12/04/2019, the patient states that she has been feeling good. She stopped taking Bupropion due to balance. She requests to refill for Amiodarone. She denies any palpitations or shortness of breath. The patient monitors her blood pressure at home.      This visit is being performed via phone to discuss Atrial Fibrillation and Md Call    [facility] is in [place] and her identity has been established.   She understands that we are limiting office visits due to the coronavirus pandemic and was informed that consent to treat includes permission to submit charges to her insurance. It was also shared that without being seen and evaluated in person, there is a risk that the information and/or assessment may be incomplete or inaccurate.  5-10 minutes were spent in this encounter.       CT Angiogram Head 05/09/2019:  IMPRESSION:    CT HEAD W CONTRAST:   1.  Unchanged small left frontal subarachnoid hemorrhage.  2.  No pathologic intracranial enhancement.   CT ANGIOGRAM HEAD:  1.  Recent prior left middle cerebral artery M2 occlusion is no longer  present but just distal to the proximal most component of the prior  occlusion there is high-grade focal stenosis.   2.  Mild atherosclerotic calcification of the bilateral intracranial and  cervical ICAs without high-grade narrowing.     Echo M-Mode/2D 05/07/2019:  Normal left ventricular size and wall thickness with no regional wall motion abnormalities. LVEF, 62 %.  Grade I/IV diastolic dysfunction.  Normal right ventricular size and systolic function.  No significant valve abnormalities.    CT Angiogram Head Neck w/contrast 05/06/2019:  IMPRESSION:  1.  There is occlusion of a proximal sylvian branch arising from the  Physical Therapy Treatment    Patient Name:  Tim Richards   MRN:  [id number]    Recommendations:     Discharge Recommendations:  rehabilitation facility   Discharge Equipment Recommendations:  (will determine DME needs closer to discharge)   Barriers to discharge: Decreased caregiver support family will not be able to assist at current functional level.     Assessment:     Tim Richards is a 56 y.o. male admitted with a medical diagnosis of Left ventricular assist device (LVAD) complication.  He presents with the following impairments/functional limitations:  weakness, impaired endurance, impaired functional mobility, gait instability, impaired balance, decreased safety awareness, decreased lower extremity function  Pt tolerated treatment well and will benefit from cont skilled PT 5x/wk to progress physically. Pt will need inpt rehab when medically stable to maximize functional mobility. . Pt was evaluated and discharged 6/29 and 7/6 due to being Independent. Pt had LVAD pump exchange 7/22/22.    Rehab Prognosis: Good; patient would benefit from acute skilled PT services to address these deficits and reach maximum level of function.    Recent Surgery: Procedure(s) (LRB):  REMOVAL, FOREIGN BODY (N/A) 11 Days Post-Op    Plan:     During this hospitalization, patient to be seen 5 x/week to address the identified rehab impairments via gait training, therapeutic activities, therapeutic exercises, neuromuscular re-education and progress toward the following goals:    · Plan of Care Expires:  08/20/22    Subjective     Chief Complaint: pt had no complaints during treatment.   Patient/Family Comments/goals: pt did not set goals and no family present to set goals.   Pain/Comfort:  · Pain Rating 1: 0/10  · Pain Rating Post-Intervention 1: 0/10      Objective:     Communicated with nurse prior to session.  Patient found supine with telemetry, arterial line, ventilator, SCD, central line, sun catheter, LVAD, PICC  inferior M2 division of the left middle cerebral artery.  The right middle cerebral artery is unremarkable.  2.  Approximate 45% narrowing of the proximal left internal carotid artery.    Regadenosen Stress Test 2015:   1.  No evidence of Regadenoson induced ischemia.  2.  No evidence of previous myocardial injury pattern.  3.  Left ventricular ejection fraction greater than 60%.    ------------------------    The patient was hospitalized at [facility] from 2019 through 2019 after she suffered an acute left middle cerebral artery stroke. The patient presented to [facility] emergency room complaining of aphasia and right-sided weakness and was transferred to [facility].  She underwent initial M2 thrombectomy.  She was discharged on aspirin and Xarelto.      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 fib RVR   • Mass excision  2000    neck mass benign   • Nm gracie per rst/strs pharmacolo  11/11/15    Negative Lexiscan; EF greater than 60%   • Thrombectomy  2019    MCA stroke   • Tubal ligation     • Tumor excision      Initial left vocal cord excision squamous cell cancer 2003 excision vocal cord lesion 2000   • Us carotid duplex bilateral  13    Unremarkable       mEDICATIONS    Current Outpatient Medications   Medication Sig   • carvedilol (COREG) 12.5 MG tablet TAKE 1 TABLET TWICE DAILY WITH  line, wound vac (trach to ET tube, B Hany Kelton boots, CVP, mid line PICC) upon PT entry to room.     General Precautions: Standard, LVAD, fall, sternal   Orthopedic Precautions:N/A   Braces:    Respiratory Status: vent to ET tube     Functional Mobility:  · Not performed       AM-PAC 6 CLICK MOBILITY  Turning over in bed (including adjusting bedclothes, sheets and blankets)?: 2  Sitting down on and standing up from a chair with arms (e.g., wheelchair, bedside commode, etc.): 2  Moving from lying on back to sitting on the side of the bed?: 2  Moving to and from a bed to a chair (including a wheelchair)?: 2  Need to walk in hospital room?: 1  Climbing 3-5 steps with a railing?: 1  Basic Mobility Total Score: 10       Therapeutic Activities and Exercises:   pt performed AAROM there exer x 4 extremities x 10 reps in supine.     Patient left supine with all lines intact, call button in reach and RN notified..    GOALS:   Multidisciplinary Problems     Physical Therapy Goals        Problem: Physical Therapy    Goal Priority Disciplines Outcome Goal Variances Interventions   Physical Therapy Goal     PT, PT/OT Ongoing, Progressing     Description: Goals to be met by: 22    Patient will increase functional independence with mobility by performin. Supine to sit with MInimal Assistance -not met  2. Sit to stand transfer with Contact Guard Assistance -not met  3. Gait  x 150 feet with Contact Guard Assistance with approved assistive device -not met  4. Ascend/descend 8 stair with right Handrails Contact Guard Assistance -not met  5. Sitting at edge of bed x15 minutes with Contact Guard Assistance to improve tolerance to activities. -not met  6. Lower extremity exercise program x15 reps with assistance as needed -not met               Multidisciplinary Problems (Resolved)        Problem: Physical Therapy    Goal Priority Disciplines Outcome Goal Variances Interventions   Physical Therapy Goal   (Resolved)     PT,  MEALS   • AMIODarone (PACERONE) 200 MG tablet TAKE 1/2 TABLET EVERY DAY   • levothyroxine 50 MCG tablet TAKE 1 TABLET BY MOUTH DAILY (BEFORE BREAKFAST).   • buPROPion (WELLBUTRIN XL) 150 MG 24 hr tablet Take 1 tablet by mouth daily.   • rivaroxaban (XARELTO) 20 MG Tab Take 1 tablet by mouth daily (with dinner).   • clopidogrel (PLAVIX) 75 MG tablet Take 1 tablet by mouth daily.   • pravastatin (PRAVACHOL) 20 MG tablet Take 1 tablet by mouth daily.   • amLODIPine (NORVASC) 5 MG tablet Take 1 tablet by mouth every evening.     No current facility-administered medications for this visit.        aLLERGIES    ALLERGIES:   Allergen Reactions   • Codeine    • Diovan [Valsartan] HEADACHES   • Lisinopril Cough   • Valacyclovir DIZZINESS       Physical Exam    Vital Signs:    There were no vitals filed for this visit.   Phone Visit    Glucose (mg/dL)   Date Value   12/03/2019 88       CHOLESTEROL (mg/dL)   Date Value   01/30/2020 162     HDL (mg/dL)   Date Value   01/30/2020 62     CHOL/HDL (no units)   Date Value   01/30/2020 2.6     TRIGLYCERIDE (mg/dL)   Date Value   01/30/2020 114     CALCULATED LDL (mg/dL)   Date Value   01/30/2020 77       Creatinine (mg/dL)   Date Value   12/03/2019 0.98 (H)     BUN (mg/dL)   Date Value   12/03/2019 14     Hemoglobin A1C (%)   Date Value   05/09/2019 5.2     INR (no units)   Date Value   05/09/2019 1.2     EKG 05/09/2019:  Normal sinus rhythm.  Nonspecific T-wave abnormality.  Abnormal ECG.   ms.    Assessment  1.  Left middle cerebral artery stroke 5/02/2019, status post M2 thrombectomy.   2.  Paroxysmal atrial fibrillation, remains in sinus rhythm on amiodarone 200 mg once daily.  Anticoagulation with Xarelto.  3.  Hypertension, stable.     PLAN   At the last office visit, I had Instructed her to check her blood pressure when the patient has any more episodes of a loud heart beat at night when laying down.      The patient is stable on her current regimen  Continue present  PT/OT Met     Description: The patient is near his functional baseline, he ambulated within the room with no AD and independence. Unable to do stair training, assess gait endurance due to COVID restrictions. He is safe to return home with no therapy needs. He is in agreement with PT discharge, he states he is confident he can ascend/descend his stairs.           Problem: Physical Therapy    Goal Priority Disciplines Outcome Goal Variances Interventions   Physical Therapy Goal   (Resolved)     PT, PT/OT Met                     Time Tracking:     PT Received On: 08/02/22  PT Start Time: 0815     PT Stop Time: 0832  PT Total Time (min): 17 min     Billable Minutes: Therapeutic Exercise 17 min    Treatment Type: Treatment  PT/PTA: PT     PTA Visit Number: 0     08/02/2022   cardiac medications.   Follow up with me in 6 months.       On 6/3/2020, I Soon Maximus Rg, personally transcribed this document on behalf of  Tohmas Fischer MD.    The documentation recorded by the scribe accurately and completely reflects the service(s) I personally performed and the decisions made by me.          Thomas Fischer M.D.  [facility]    CC: Wilmer Pillai, DO

## 2022-08-02 NOTE — PROGRESS NOTES
[facility]  Heart Failure  Progress Note     Hospital Length of Stay: 36    Interval History:  -Continue to spike temp.       Vitals:  Temp:  [97.5 °F (36.4 °C)-100.22 °F (37.9 °C)] 98.78 °F (37.1 °C)  Pulse:  [73-91] 87  Resp:  [20-25] 24  SpO2:  [97 %-99 %] 97 %  BP: (74-82)/(0) 78/0  Arterial Line BP: (74-89)/(64-77) 83/73    Intake/Output    Intake/Output Summary (Last 24 hours) at 8/2/2022 1429  Last data filed at 8/2/2022 1300  Gross per 24 hour   Intake 3683.44 ml   Output 5620 ml   Net -1936.56 ml       Physical Exam  Gen: Intubated and following some command.   HEENT: Pupils equal and reactive to light  Cardio: Regular rate, point of maximal impulse not displaced,1+ radial pulses bilaterally, 1+ DP pulses bilaterally, VAD hum obstructing heart sounds  Resp: crackles, rhonchi  Abd: Soft, non-tender, non-distended  : CLEO drainage  Skin: Warm,  trace peripheral edema  Neuro: intubated. Unable to assess.   Psych: unable to assess     Labs:  Recent Labs   Lab 08/01/22 1945 08/02/22 0325 08/02/22 0327 08/02/22  1219   WBC 16.93* 18.98*  --  19.51*   HGB 7.2* 7.0*  --  7.3*   HCT 23.8* 23.4* 28* 24.3*    339  --  345     Recent Labs   Lab 08/01/22 1945 08/02/22  0325 08/02/22  1219    140 140   K 4.6 3.5 4.3    106 107   CO2 22* 21* 24   BUN 76* 71* 65*   CREATININE 1.8* 1.6* 1.5*   * 201* 213*   CALCIUM 9.4 9.5 8.8   MG 2.2 2.1 2.6   PHOS 3.1 2.3* 2.8       Micro:    Current Meds:   acetylcysteine 100 mg/ml (10%)  4 mL Nebulization Q6H    amiodarone  400 mg Oral TID    aspirin  81 mg Per OG tube Daily    ceFEPime (MAXIPIME) IVPB  2 g Intravenous Q12H    cholestyramine  1 packet Per NG tube QID    guaiFENesin 100 mg/5 ml  300 mg Per NG tube Q6H    insulin aspart U-100  3 Units Subcutaneous Q4H    levalbuterol  0.63 mg Nebulization Q6H    lipid (SMOFLIPID)  250 mL Intravenous Daily    methIMAzole  20 mg Per NG tube BID    metroNIDAZOLE  500 mg Oral Q8H     mexiletine  400 mg Oral Q8H    midodrine  15 mg Per NG tube Q8H    pantoprazole  40 mg Intravenous BID    polyethylene glycol  17 g Per NG tube BID    predniSONE  40 mg Per NG tube Daily       Continuous Infusions:   amiodarone in dextrose 5% Stopped (08/02/22 0812)    dexmedetomidine (PRECEDEX) infusion 0.6 mcg/kg/hr (08/02/22 1300)    dextrose 10 % in water (D10W)      EPINEPHrine 0.04 mcg/kg/min (08/02/22 1300)    furosemide (LASIX) 2 mg/mL continuous infusion (non-titrating) 2.5 mg/hr (08/02/22 1300)    heparin (porcine) in 5 % dex 2,100 Units/hr (08/02/22 1300)    LIDOcaine Stopped (08/01/22 0502)    nitric oxide gas      NORepinephrine bitartrate-D5W Stopped (07/30/22 2302)    propofoL Stopped (07/30/22 1027)    TPN ADULT CENTRAL LINE CUSTOM 40 mL/hr at 08/02/22 1300    TPN ADULT CENTRAL LINE CUSTOM      vasopressin 0.04 Units/min (08/02/22 1300)         Assessment and Plan:    Cardiogenic Shock  -Previous HM2, underwent pump exchange to HM3 on 7/13/22 complicated by worsening CS/RV failure requiring VA ECMO now decannulated  -Re-intubated on 7/29/22 due to hypercapnic resp failure  -Currently on Epi 0.04, vaso 0.04  -CTS primary.   - NO has been weaned off.   - Plan for tracheostomy tomorrow.     LVAD  TXP LVAD INTERROGATIONS 7/31/2022 7/31/2022 7/31/2022 7/31/2022 7/31/2022 7/31/2022 7/31/2022   Type HeartMate3 HeartMate3 HeartMate3 HeartMate3 HeartMate3 HeartMate3 HeartMate3   Flow 5.3 5.5 5.3 5.3 5.3 5.4 5.5   Speed 6200 6200 6200 6200 6200 6200 6200   PI 3.2 3.3 3.3 3.3 3.1 3.2 3.1   Power (Jackson) 5.2 5.1 5.1 5.2 5.1 5.1 5.1   LSL 5800 - - - 5800 - -   Low Flow Alarm - - - - - - -   Pulsatility Intermittent pulse - - - Intermittent pulse - -         Anxiety  -Currently intubated and sedated     History of ventricular tachycardia/Episode of A flutter 2:1 block  Patient experienced an episode of VT on 6/30 and experienced an ICD shock thought to be related to hypokalemia (K of 3.1 on  6/30)  - Aggressively replete K, keep K>4 and Mg>2  - Continue mexiletine PO, lidocaine gtt.  - Amio gtt transitioned to PO.   - EP consulted, appreciate their recommendations     COVID-19 virus infection  -Previously hypoxic then recovered  -ID was consulted, recommended Remdesivir, course completed     Elevated serum lactate dehydrogenase (LDH)  S/p HM3 exchange for HM2 pump thrombosis  LDH normalized.      amiodarone induced hypothyrodism initially, now hyperthyroidism  -Endocrine team on board.  -On prednisone and methimazole.   - T4 has trended down.      Chronic combined systolic and diastolic heart failure  ADHF  Underwent HM III upgrade on 7/13/22, currently on VA ECMO  Currently on Epi gtt, Vasopressin  Currently on Precedex  gtt for sedation  Being treated for sepsis.   On cefepime and flagyl.  Currently intubated and sedated (reintubarted 7/29)  Chest tube removal, bronch, and old driveline removed 7/22          Staff attestation to follow.    This note was prepared using voice recognition system and may have sound alike errors that may have been overlooked even with proof reading.

## 2022-08-02 NOTE — PLAN OF CARE
"      SICU PLAN OF CARE NOTE    Dx: Left ventricular assist device (LVAD) complication    Shift Events: Amio gtt d/c'd    Goals of Care: Comfort, wean pressors, trach, increase rom and stregnth.     Neuro: follows commands, moves all extremities.     Vital Signs: BP (!) 78/0 (BP Location: Right arm)   Pulse 84   Temp 99.32 °F (37.4 °C)   Resp (!) 24   Ht 6' 1" (1.854 m)   Wt 101.6 kg (224 lb)   SpO2 99%   BMI 29.55 kg/m²     Respiratory: AC 50%/5 peep    Diet: TPN    Gtts: Precedex, Lasix, Epi, Vaso, TPN, Heparin    Urine Output: 150-200cc/h    Drains: Lagunas Catheter    HM3 Speed 6300             Labs/Accuchecks: Frequent Labs, Accuchecks q4h..    Skin: Sutures to chest incision intact. Old LVAD side dressing CDI, Driveline sight to abdomen left intact.        "

## 2022-08-02 NOTE — PROGRESS NOTES
08/02/2022  Casper Harris    Current provider:  Yg Kaufman MD    Device interrogation:  TXP LVAD INTERROGATIONS 8/2/2022 8/2/2022 8/2/2022 8/2/2022 8/2/2022 8/2/2022 8/2/2022   Type HeartMate3 HeartMate3 HeartMate3 HeartMate3 HeartMate3 HeartMate3 HeartMate3   Flow 5.4 5.4 5.5 5.4 5.4 5.4 5.2   Speed 6300 6300 6300 6300 6300 6300 6300   PI 3.1 3.1 3.2 3.6 3.4 3.3 3.7   Power (Jackson) 5.3 5.2 5.2 5.3 5.3 5.3 5.2   LSL 5900 5900 5900 5900 5900 5900 5900   Low Flow Alarm - - - - - - -   Pulsatility Intermittent pulse Intermittent pulse Intermittent pulse Intermittent pulse Intermittent pulse Intermittent pulse Intermittent pulse          Rounded on Tim Richards to ensure all mechanical assist device settings (IABP or VAD) were appropriate and all parameters were within limits.  I was able to ensure all back up equipment was present, the staff had no issues, and the Perfusion Department daily rounding was complete.      For implantable VADs: Interrogation of Ventricular assist device was performed with analysis of device parameters and review of device function. I have personally reviewed the interrogation findings and agree with findings as stated.     In emergency, the nursing units have been notified to contact the perfusion department either by:  Calling [phone] from 630am to 4pm Mon thru Fri, utilizing the On-Call Finder functionality of Epic and searching for Perfusion, or by contacting the hospital  from 4pm to 630am and on weekends and asking to speak with the perfusionist on call.    10:00 AM

## 2022-08-02 NOTE — PROGRESS NOTES
I have reviewed and concur with Dr. Rodriguez's history, physical, assessment, and plan.  I have personally interviewed and examined the patient.      Wendy Casper MD

## 2022-08-02 NOTE — SUBJECTIVE & OBJECTIVE
Interval History/Significant Events: NAEON. Stopped amio drip this morning. Went down on heparin drip from 2200 to 2000. Titrated down his fi02 to 50%. Is now off nitric oxide. No fevers overnight. T4 trending down    Planning for trach tomorrow    Follow-up For: Procedure(s) (LRB):  REMOVAL, FOREIGN BODY (N/A)    Post-Operative Day: 11 Days Post-Op    Objective:     Vital Signs (Most Recent):  Temp: 97.5 °F (36.4 °C) (08/02/22 0932)  Pulse: 85 (08/02/22 0932)  Resp: (!) 25 (08/02/22 0741)  BP: (!) 82/0 (08/02/22 0715)  SpO2: 97 % (08/02/22 0006)   Vital Signs (24h Range):  Temp:  [97.5 °F (36.4 °C)-100.6 °F (38.1 °C)] 97.5 °F (36.4 °C)  Pulse:  [70-97] 85  Resp:  [20-25] 25  SpO2:  [97 %-100 %] 97 %  BP: (74-82)/(0) 82/0  Arterial Line BP: (74-89)/(64-77) 77/68     Weight: 101.6 kg (224 lb)  Body mass index is 29.55 kg/m².      Intake/Output Summary (Last 24 hours) at 8/2/2022 1027  Last data filed at 8/2/2022 0900  Gross per 24 hour   Intake 3592.96 ml   Output 5835 ml   Net -2242.04 ml       Physical Exam  Constitutional:       Comments: Intubated and sedated   HENT:      Head: Normocephalic and atraumatic.      Nose:      Comments: NG in place  Eyes:      Pupils: Pupils are equal, round, and reactive to light.   Neck:      Comments: R IJ CVC    Cardiovascular:      Rate and Rhythm: Normal rate. Rhythm irregular.      Comments: LVAD in place -- 6400 rpm, 5.0L    Midline sternotomy c/d with dressing in place      Pulmonary:      Comments: Intubated      Abdominal:      General: There is no distension.      Palpations: Abdomen is soft.      Comments: Prior driveline site packed with iodoform gauze   Genitourinary:     Comments: Lagunas in place draining clear urine  Musculoskeletal:      Comments: ECMO cannula sites with dressing in place.     Cutdown incision with seroma with wound vac in place, serous output. Changed yesterday    right radial arterial line   Skin:     General: Skin is warm and dry.   Neurological:       Comments: Sedated       Vents:  Vent Mode: A/C (08/02/22 0932)  Ventilator Initiated: Yes (07/29/22 0002)  Set Rate: 24 BPM (08/02/22 0932)  Vt Set: 550 mL (08/02/22 0932)  Pressure Support: 8 cmH20 (07/31/22 2043)  PEEP/CPAP: 5 cmH20 (08/02/22 0932)  Oxygen Concentration (%): 50 (08/02/22 0932)  Peak Airway Pressure: 28 cmH2O (08/02/22 0932)  Plateau Pressure: 30 cmH20 (08/02/22 0932)  Total Ve: 13.6 mL (08/02/22 0932)  Negative Inspiratory Force (cm H2O): 0 (08/02/22 0932)  F/VT Ratio<105 (RSBI): (!) 45.05 (08/02/22 0741)    Lines/Drains/Airways       Central Venous Catheter Line  Duration             Percutaneous Central Line Insertion/Assessment - Quad Lumen  07/21/22 1515 right internal jugular 11 days              Drain  Duration                  NG/OG Tube 07/15/22 1030 Left nostril 17 days         Urethral Catheter 07/27/22 1536 Temperature probe 16 Fr. 5 days              Airway  Duration                  Airway - Non-Surgical 07/29/22 0042 4 days              Arterial Line  Duration             Arterial Line 07/30/22 0900 Right Radial 3 days              Line  Duration                  VAD 07/13/22 1300 Left ventricular assist device HeartMate 3 19 days              Peripheral Intravenous Line  Duration                  Midline Catheter Insertion/Assessment  - Single Lumen 07/21/22 1616 Left basilic vein (medial side of arm) 18g x 10cm 11 days         Peripheral IV - Single Lumen 07/30/22 0648 22 G Posterior;Right Hand 3 days                    Significant Labs:    CBC/Anemia Profile:  Recent Labs   Lab 08/01/22  1210 08/01/22 1945 08/02/22  0325 08/02/22  0327   WBC 15.22* 16.93* 18.98*  --    HGB 7.2* 7.2* 7.0*  --    HCT 23.6* 23.8* 23.4* 28*    359 339  --    MCV 95 94 91  --    RDW 21.2* 21.0* 20.8*  --         Chemistries:  Recent Labs   Lab 08/01/22  1210 08/01/22  1945 08/02/22  0325    140 140   K 3.9 4.6 3.5    107 106   CO2 22* 22* 21*   BUN 79* 76* 71*   CREATININE 1.9*  1.8* 1.6*   CALCIUM 8.6* 9.4 9.5   ALBUMIN 2.0* 1.9* 2.0*   PROT 6.1 6.9 7.1   BILITOT 6.4* 6.3* 6.3*   ALKPHOS 92 89 91   ALT 29 30 30   AST 31 34 33   MG 2.1 2.2 2.1   PHOS 3.0 3.1 2.3*       All pertinent labs within the past 24 hours have been reviewed.    Significant Imaging:  I have reviewed all pertinent imaging results/findings within the past 24 hours.

## 2022-08-02 NOTE — PROGRESS NOTES
[facility]  Critical Care - Surgery  Progress Note    Patient Name: Tim Richards  MRN: [id number]  Admission Date: 6/27/2022  Hospital Length of Stay: 36 days  Code Status: Full Code  Attending Provider: Yg Kaufman MD  Primary Care Provider: Deyanira Booth MD   Principal Problem: Left ventricular assist device (LVAD) complication    Subjective:     Hospital/ICU Course:  No notes on file    Interval History/Significant Events: NAEON. Stopped amio drip this morning. Went down on heparin drip from 2200 to 2000. Titrated down his fi02 to 50%. Is now off nitric oxide. No fevers overnight. T4 trending down    Planning for trach tomorrow    Follow-up For: Procedure(s) (LRB):  REMOVAL, FOREIGN BODY (N/A)    Post-Operative Day: 11 Days Post-Op    Objective:     Vital Signs (Most Recent):  Temp: 97.5 °F (36.4 °C) (08/02/22 0932)  Pulse: 85 (08/02/22 0932)  Resp: (!) 25 (08/02/22 0741)  BP: (!) 82/0 (08/02/22 0715)  SpO2: 97 % (08/02/22 0006)   Vital Signs (24h Range):  Temp:  [97.5 °F (36.4 °C)-100.6 °F (38.1 °C)] 97.5 °F (36.4 °C)  Pulse:  [70-97] 85  Resp:  [20-25] 25  SpO2:  [97 %-100 %] 97 %  BP: (74-82)/(0) 82/0  Arterial Line BP: (74-89)/(64-77) 77/68     Weight: 101.6 kg (224 lb)  Body mass index is 29.55 kg/m².      Intake/Output Summary (Last 24 hours) at 8/2/2022 1027  Last data filed at 8/2/2022 0900  Gross per 24 hour   Intake 3592.96 ml   Output 5835 ml   Net -2242.04 ml       Physical Exam  Constitutional:       Comments: Intubated and sedated   HENT:      Head: Normocephalic and atraumatic.      Nose:      Comments: NG in place  Eyes:      Pupils: Pupils are equal, round, and reactive to light.   Neck:      Comments: R IJ CVC    Cardiovascular:      Rate and Rhythm: Normal rate. Rhythm irregular.      Comments: LVAD in place -- 6400 rpm, 5.0L    Midline sternotomy c/d with dressing in place      Pulmonary:      Comments: Intubated      Abdominal:      General: There is no  distension.      Palpations: Abdomen is soft.      Comments: Prior driveline site packed with iodoform gauze   Genitourinary:     Comments: Lagunas in place draining clear urine  Musculoskeletal:      Comments: ECMO cannula sites with dressing in place.     Cutdown incision with seroma with wound vac in place, serous output. Changed yesterday    right radial arterial line   Skin:     General: Skin is warm and dry.   Neurological:      Comments: Sedated       Vents:  Vent Mode: A/C (08/02/22 0932)  Ventilator Initiated: Yes (07/29/22 0002)  Set Rate: 24 BPM (08/02/22 0932)  Vt Set: 550 mL (08/02/22 0932)  Pressure Support: 8 cmH20 (07/31/22 2043)  PEEP/CPAP: 5 cmH20 (08/02/22 0932)  Oxygen Concentration (%): 50 (08/02/22 0932)  Peak Airway Pressure: 28 cmH2O (08/02/22 0932)  Plateau Pressure: 30 cmH20 (08/02/22 0932)  Total Ve: 13.6 mL (08/02/22 0932)  Negative Inspiratory Force (cm H2O): 0 (08/02/22 0932)  F/VT Ratio<105 (RSBI): (!) 45.05 (08/02/22 0741)    Lines/Drains/Airways       Central Venous Catheter Line  Duration             Percutaneous Central Line Insertion/Assessment - Quad Lumen  07/21/22 1515 right internal jugular 11 days              Drain  Duration                  NG/OG Tube 07/15/22 1030 Left nostril 17 days         Urethral Catheter 07/27/22 1536 Temperature probe 16 Fr. 5 days              Airway  Duration                  Airway - Non-Surgical 07/29/22 0042 4 days              Arterial Line  Duration             Arterial Line 07/30/22 0900 Right Radial 3 days              Line  Duration                  VAD 07/13/22 1300 Left ventricular assist device HeartMate 3 19 days              Peripheral Intravenous Line  Duration                  Midline Catheter Insertion/Assessment  - Single Lumen 07/21/22 1616 Left basilic vein (medial side of arm) 18g x 10cm 11 days         Peripheral IV - Single Lumen 07/30/22 0648 22 G Posterior;Right Hand 3 days                    Significant Labs:    CBC/Anemia  Profile:  Recent Labs   Lab 08/01/22  1210 08/01/22 1945 08/02/22 0325 08/02/22 0327   WBC 15.22* 16.93* 18.98*  --    HGB 7.2* 7.2* 7.0*  --    HCT 23.6* 23.8* 23.4* 28*    359 339  --    MCV 95 94 91  --    RDW 21.2* 21.0* 20.8*  --         Chemistries:  Recent Labs   Lab 08/01/22  1210 08/01/22 1945 08/02/22 0325    140 140   K 3.9 4.6 3.5    107 106   CO2 22* 22* 21*   BUN 79* 76* 71*   CREATININE 1.9* 1.8* 1.6*   CALCIUM 8.6* 9.4 9.5   ALBUMIN 2.0* 1.9* 2.0*   PROT 6.1 6.9 7.1   BILITOT 6.4* 6.3* 6.3*   ALKPHOS 92 89 91   ALT 29 30 30   AST 31 34 33   MG 2.1 2.2 2.1   PHOS 3.0 3.1 2.3*       All pertinent labs within the past 24 hours have been reviewed.    Significant Imaging:  I have reviewed all pertinent imaging results/findings within the past 24 hours.    Assessment/Plan:     Chronic combined systolic and diastolic heart failure  Tim Richards is a 56 y.o. male HFrEF with an EF of 10% and a history of HM2 LVAD in 2018 who is now s/p HM3 upgrade, initiation of V-A ECMO after failure to wean off bypass x2      Neuro/Psych:   -- Sedation: precedex  -- Pain: PRN Dilaudid             Cards:   -- S/P LVAD exchange on 7/14/22  --Improving pressor requirements   Epi 0.04   Vaso 0.04  -- Continue Midodrine 15 mg Q8  -- MAP goal 75  -- VAD  flows stable  -- Decannulated on 7/19  -- Sternal closure on 7/15  -- Bedside drive line removal on 7/23  -- EP consult due to v-tach  -- restarted mexiletine. On PO amio      Pulm:   -- Goal O2 sat > 90%  -- ABG PRN  -- Nitric off 8/2  -- Extubated 7/26, re intubated 7/28  -- Monitor O2 sat with ABGs      Renal:  -- Keep sun for strict I/O  -- Trend BUN/Cr  -- Lasix gtt 2.5/hr     FEN / GI:   -- Replace lytes as needed  -- Nutrition: NPO,TPN  -- GI ppx: PPI  -- Bowel reg: miralax, docusate, mag citrate      ID:   -- Tm: afebrile night of 8/1  -- ABX cefepime, falgyl  -- Cultures sent, repeat cultures sent 7/22, no growth to date, repeat  cultures sent 7/26 --> gram negative rods on sputum cultures  -- BAL GNR --> pansensitive klebsiella   -- Repeat sputum cultures --> pansensitive klebsiella  -- daily vanc levels  -- Covid test negative      Heme/Onc:   -- H/H stable   -- Daily CBC  -- Received 18 pRBCs, 16 FFP, 2 plt, 25 cryo; 1500 albumin intra-op  -- Heparin gtt at 2000, do not titrate        Endo:   -- BG goal 140-180  -- SSI  -- Levothyroxine discontinued  -- hyperthyroid despite being hypothyroid at home  -- Endocrine consulted  -- Started on prednisone and methimazole       PPx:   Feeding: NPO, TPN  Analgesia/Sedation: Precedex/ PRN Dilaudid  Thromboembolic prevention: SCDs, heparin gtt  HOB >30: Yes  Stress Ulcer ppx: PPI  Glucose control: Critical care goal 140-180 g/dl, ISS     Lines/Drains/Airway: NG; RIJ CVC, r-radial a-line, sun; W[place], VAD; midline      Dispo/Code Status/Palliative:   -- SICU / Full Code.             Critical secondary to Patient has a condition that poses threat to life and bodily function: LVAD and intubated     Critical care was time spent personally by me on the following activities: development of treatment plan with patient or surrogate and bedside caregivers, discussions with consultants, evaluation of patient's response to treatment, examination of patient, ordering and performing treatments and interventions, ordering and review of laboratory studies, ordering and review of radiographic studies, pulse oximetry, re-evaluation of patient's condition.  This critical care time did not overlap with that of any other provider or involve time for any procedures.     Randell Jackson MD  Critical Care - Surgery  [facility]

## 2022-08-03 ENCOUNTER — ANESTHESIA EVENT (OUTPATIENT)
Dept: SURGERY | Facility: HOSPITAL | Age: 57
DRG: 001 | End: 2022-08-03
Payer: COMMERCIAL

## 2022-08-03 LAB
ALBUMIN SERPL BCP-MCNC: 2 G/DL (ref 3.5–5.2)
ALBUMIN SERPL BCP-MCNC: 2.1 G/DL (ref 3.5–5.2)
ALBUMIN SERPL BCP-MCNC: 2.1 G/DL (ref 3.5–5.2)
ALLENS TEST: ABNORMAL
ALP SERPL-CCNC: 90 U/L (ref 55–135)
ALP SERPL-CCNC: 90 U/L (ref 55–135)
ALP SERPL-CCNC: 93 U/L (ref 55–135)
ALT SERPL W/O P-5'-P-CCNC: 32 U/L (ref 10–44)
ALT SERPL W/O P-5'-P-CCNC: 32 U/L (ref 10–44)
ALT SERPL W/O P-5'-P-CCNC: 33 U/L (ref 10–44)
ANION GAP SERPL CALC-SCNC: 11 MMOL/L (ref 8–16)
ANION GAP SERPL CALC-SCNC: 12 MMOL/L (ref 8–16)
ANION GAP SERPL CALC-SCNC: 9 MMOL/L (ref 8–16)
APTT BLDCRRT: 41.1 SEC (ref 21–32)
AST SERPL-CCNC: 32 U/L (ref 10–40)
AST SERPL-CCNC: 38 U/L (ref 10–40)
AST SERPL-CCNC: 39 U/L (ref 10–40)
BASOPHILS # BLD AUTO: 0.02 K/UL (ref 0–0.2)
BASOPHILS # BLD AUTO: 0.02 K/UL (ref 0–0.2)
BASOPHILS NFR BLD: 0.1 % (ref 0–1.9)
BASOPHILS NFR BLD: 0.1 % (ref 0–1.9)
BILIRUB SERPL-MCNC: 5.4 MG/DL (ref 0.1–1)
BILIRUB SERPL-MCNC: 5.9 MG/DL (ref 0.1–1)
BILIRUB SERPL-MCNC: 6 MG/DL (ref 0.1–1)
BNP SERPL-MCNC: 2685 PG/ML (ref 0–99)
BUN SERPL-MCNC: 59 MG/DL (ref 6–20)
BUN SERPL-MCNC: 60 MG/DL (ref 6–20)
BUN SERPL-MCNC: 61 MG/DL (ref 6–20)
CALCIUM SERPL-MCNC: 8.5 MG/DL (ref 8.7–10.5)
CALCIUM SERPL-MCNC: 9.3 MG/DL (ref 8.7–10.5)
CALCIUM SERPL-MCNC: 9.4 MG/DL (ref 8.7–10.5)
CHLORIDE SERPL-SCNC: 106 MMOL/L (ref 95–110)
CHLORIDE SERPL-SCNC: 108 MMOL/L (ref 95–110)
CHLORIDE SERPL-SCNC: 110 MMOL/L (ref 95–110)
CO2 SERPL-SCNC: 20 MMOL/L (ref 23–29)
CO2 SERPL-SCNC: 21 MMOL/L (ref 23–29)
CO2 SERPL-SCNC: 24 MMOL/L (ref 23–29)
CREAT SERPL-MCNC: 1.3 MG/DL (ref 0.5–1.4)
CRP SERPL-MCNC: 27.9 MG/L (ref 0–8.2)
DIFFERENTIAL METHOD: ABNORMAL
DIFFERENTIAL METHOD: ABNORMAL
EOSINOPHIL # BLD AUTO: 0 K/UL (ref 0–0.5)
EOSINOPHIL # BLD AUTO: 0.1 K/UL (ref 0–0.5)
EOSINOPHIL NFR BLD: 0 % (ref 0–8)
EOSINOPHIL NFR BLD: 0.2 % (ref 0–8)
ERYTHROCYTE [DISTWIDTH] IN BLOOD BY AUTOMATED COUNT: 20 % (ref 11.5–14.5)
ERYTHROCYTE [DISTWIDTH] IN BLOOD BY AUTOMATED COUNT: 20.3 % (ref 11.5–14.5)
EST. GFR  (NO RACE VARIABLE): >60 ML/MIN/1.73 M^2
FACT X PPP CHRO-ACNC: 0.28 IU/ML (ref 0.3–0.7)
FACT X PPP CHRO-ACNC: 0.32 IU/ML (ref 0.3–0.7)
FACT X PPP CHRO-ACNC: 0.33 IU/ML (ref 0.3–0.7)
FIBRINOGEN PPP-MCNC: 738 MG/DL (ref 182–400)
FINAL PATHOLOGIC DIAGNOSIS: NORMAL
GLUCOSE SERPL-MCNC: 160 MG/DL (ref 70–110)
GLUCOSE SERPL-MCNC: 214 MG/DL (ref 70–110)
GLUCOSE SERPL-MCNC: 220 MG/DL (ref 70–110)
GROSS: NORMAL
HCO3 UR-SCNC: 24.5 MMOL/L (ref 24–28)
HCT VFR BLD AUTO: 24.3 % (ref 40–54)
HCT VFR BLD AUTO: 24.4 % (ref 40–54)
HCT VFR BLD AUTO: 25.1 % (ref 40–54)
HCT VFR BLD CALC: 25 %PCV (ref 36–54)
HGB BLD-MCNC: 7.2 G/DL (ref 14–18)
HGB BLD-MCNC: 7.3 G/DL (ref 14–18)
HGB BLD-MCNC: 7.3 G/DL (ref 14–18)
IMM GRANULOCYTES # BLD AUTO: 0.28 K/UL (ref 0–0.04)
IMM GRANULOCYTES # BLD AUTO: 0.52 K/UL (ref 0–0.04)
IMM GRANULOCYTES NFR BLD AUTO: 1.3 % (ref 0–0.5)
IMM GRANULOCYTES NFR BLD AUTO: 2.5 % (ref 0–0.5)
INR PPP: 1.2 (ref 0.8–1.2)
INR PPP: 1.2 (ref 0.8–1.2)
INR PPP: 1.3 (ref 0.8–1.2)
LDH SERPL L TO P-CCNC: 202 U/L (ref 110–260)
LYMPHOCYTES # BLD AUTO: 0.5 K/UL (ref 1–4.8)
LYMPHOCYTES # BLD AUTO: 0.5 K/UL (ref 1–4.8)
LYMPHOCYTES NFR BLD: 2.2 % (ref 18–48)
LYMPHOCYTES NFR BLD: 2.4 % (ref 18–48)
Lab: NORMAL
MAGNESIUM SERPL-MCNC: 2 MG/DL (ref 1.6–2.6)
MAGNESIUM SERPL-MCNC: 2.4 MG/DL (ref 1.6–2.6)
MAGNESIUM SERPL-MCNC: 2.5 MG/DL (ref 1.6–2.6)
MCH RBC QN AUTO: 27.3 PG (ref 27–31)
MCH RBC QN AUTO: 28.1 PG (ref 27–31)
MCHC RBC AUTO-ENTMCNC: 29.6 G/DL (ref 32–36)
MCHC RBC AUTO-ENTMCNC: 29.9 G/DL (ref 32–36)
MCV RBC AUTO: 91 FL (ref 82–98)
MCV RBC AUTO: 95 FL (ref 82–98)
MONOCYTES # BLD AUTO: 1.1 K/UL (ref 0.3–1)
MONOCYTES # BLD AUTO: 1.6 K/UL (ref 0.3–1)
MONOCYTES NFR BLD: 5.3 % (ref 4–15)
MONOCYTES NFR BLD: 7.6 % (ref 4–15)
NEUTROPHILS # BLD AUTO: 18.3 K/UL (ref 1.8–7.7)
NEUTROPHILS # BLD AUTO: 18.6 K/UL (ref 1.8–7.7)
NEUTROPHILS NFR BLD: 88.4 % (ref 38–73)
NEUTROPHILS NFR BLD: 89.9 % (ref 38–73)
NRBC BLD-RTO: 0 /100 WBC
NRBC BLD-RTO: 0 /100 WBC
PCO2 BLDA: 36.9 MMHG (ref 35–45)
PH SMN: 7.43 [PH] (ref 7.35–7.45)
PHOSPHATE SERPL-MCNC: 2.2 MG/DL (ref 2.7–4.5)
PHOSPHATE SERPL-MCNC: 2.3 MG/DL (ref 2.7–4.5)
PHOSPHATE SERPL-MCNC: 3.9 MG/DL (ref 2.7–4.5)
PLATELET # BLD AUTO: 324 K/UL (ref 150–450)
PLATELET # BLD AUTO: 334 K/UL (ref 150–450)
PMV BLD AUTO: 11.5 FL (ref 9.2–12.9)
PMV BLD AUTO: 11.8 FL (ref 9.2–12.9)
PO2 BLDA: 177 MMHG (ref 80–100)
POC BE: 0 MMOL/L
POC IONIZED CALCIUM: 1.27 MMOL/L (ref 1.06–1.42)
POC SATURATED O2: 100 % (ref 95–100)
POC TCO2: 26 MMOL/L (ref 23–27)
POCT GLUCOSE: 117 MG/DL (ref 70–110)
POCT GLUCOSE: 148 MG/DL (ref 70–110)
POCT GLUCOSE: 179 MG/DL (ref 70–110)
POCT GLUCOSE: 209 MG/DL (ref 70–110)
POCT GLUCOSE: 253 MG/DL (ref 70–110)
POCT GLUCOSE: 262 MG/DL (ref 70–110)
POTASSIUM BLD-SCNC: 4.6 MMOL/L (ref 3.5–5.1)
POTASSIUM SERPL-SCNC: 3.6 MMOL/L (ref 3.5–5.1)
POTASSIUM SERPL-SCNC: 4.1 MMOL/L (ref 3.5–5.1)
POTASSIUM SERPL-SCNC: 4.7 MMOL/L (ref 3.5–5.1)
PROT SERPL-MCNC: 6.1 G/DL (ref 6–8.4)
PROT SERPL-MCNC: 7.1 G/DL (ref 6–8.4)
PROT SERPL-MCNC: 7.2 G/DL (ref 6–8.4)
PROTHROMBIN TIME: 12.5 SEC (ref 9–12.5)
PROTHROMBIN TIME: 12.6 SEC (ref 9–12.5)
PROTHROMBIN TIME: 13 SEC (ref 9–12.5)
RBC # BLD AUTO: 2.56 M/UL (ref 4.6–6.2)
RBC # BLD AUTO: 2.67 M/UL (ref 4.6–6.2)
SAMPLE: ABNORMAL
SITE: ABNORMAL
SODIUM BLD-SCNC: 142 MMOL/L (ref 136–145)
SODIUM SERPL-SCNC: 139 MMOL/L (ref 136–145)
SODIUM SERPL-SCNC: 141 MMOL/L (ref 136–145)
SODIUM SERPL-SCNC: 141 MMOL/L (ref 136–145)
T4 FREE SERPL-MCNC: 3.03 NG/DL (ref 0.71–1.51)
TRIGL SERPL-MCNC: 75 MG/DL (ref 30–150)
WBC # BLD AUTO: 20.69 K/UL (ref 3.9–12.7)
WBC # BLD AUTO: 20.75 K/UL (ref 3.9–12.7)

## 2022-08-03 PROCEDURE — 80053 COMPREHEN METABOLIC PANEL: CPT | Mod: 91 | Performed by: THORACIC SURGERY (CARDIOTHORACIC VASCULAR SURGERY)

## 2022-08-03 PROCEDURE — 83735 ASSAY OF MAGNESIUM: CPT | Mod: 91 | Performed by: THORACIC SURGERY (CARDIOTHORACIC VASCULAR SURGERY)

## 2022-08-03 PROCEDURE — 20000000 HC ICU ROOM

## 2022-08-03 PROCEDURE — B4185 PARENTERAL SOL 10 GM LIPIDS: HCPCS | Performed by: STUDENT IN AN ORGANIZED HEALTH CARE EDUCATION/TRAINING PROGRAM

## 2022-08-03 PROCEDURE — 85384 FIBRINOGEN ACTIVITY: CPT | Performed by: THORACIC SURGERY (CARDIOTHORACIC VASCULAR SURGERY)

## 2022-08-03 PROCEDURE — 25000003 PHARM REV CODE 250: Performed by: STUDENT IN AN ORGANIZED HEALTH CARE EDUCATION/TRAINING PROGRAM

## 2022-08-03 PROCEDURE — 25000242 PHARM REV CODE 250 ALT 637 W/ HCPCS

## 2022-08-03 PROCEDURE — 25000003 PHARM REV CODE 250

## 2022-08-03 PROCEDURE — 93750 INTERROGATION VAD IN PERSON: CPT | Mod: ,,, | Performed by: INTERNAL MEDICINE

## 2022-08-03 PROCEDURE — 63600175 PHARM REV CODE 636 W HCPCS: Performed by: STUDENT IN AN ORGANIZED HEALTH CARE EDUCATION/TRAINING PROGRAM

## 2022-08-03 PROCEDURE — C9399 UNCLASSIFIED DRUGS OR BIOLOG: HCPCS | Performed by: STUDENT IN AN ORGANIZED HEALTH CARE EDUCATION/TRAINING PROGRAM

## 2022-08-03 PROCEDURE — 25000242 PHARM REV CODE 250 ALT 637 W/ HCPCS: Performed by: THORACIC SURGERY (CARDIOTHORACIC VASCULAR SURGERY)

## 2022-08-03 PROCEDURE — 85730 THROMBOPLASTIN TIME PARTIAL: CPT | Performed by: THORACIC SURGERY (CARDIOTHORACIC VASCULAR SURGERY)

## 2022-08-03 PROCEDURE — 85014 HEMATOCRIT: CPT

## 2022-08-03 PROCEDURE — 27000644 HC VENT IN-LINE ADAPTER

## 2022-08-03 PROCEDURE — 85014 HEMATOCRIT: CPT | Performed by: STUDENT IN AN ORGANIZED HEALTH CARE EDUCATION/TRAINING PROGRAM

## 2022-08-03 PROCEDURE — 27000221 HC OXYGEN, UP TO 24 HOURS

## 2022-08-03 PROCEDURE — 94640 AIRWAY INHALATION TREATMENT: CPT

## 2022-08-03 PROCEDURE — 82800 BLOOD PH: CPT

## 2022-08-03 PROCEDURE — 99900026 HC AIRWAY MAINTENANCE (STAT)

## 2022-08-03 PROCEDURE — 99232 SBSQ HOSP IP/OBS MODERATE 35: CPT | Mod: ,,, | Performed by: INTERNAL MEDICINE

## 2022-08-03 PROCEDURE — 84478 ASSAY OF TRIGLYCERIDES: CPT

## 2022-08-03 PROCEDURE — 99291 PR CRITICAL CARE, E/M 30-74 MINUTES: ICD-10-PCS | Mod: 25,,, | Performed by: INTERNAL MEDICINE

## 2022-08-03 PROCEDURE — 27000248 HC VAD-ADDITIONAL DAY

## 2022-08-03 PROCEDURE — 85520 HEPARIN ASSAY: CPT | Mod: 91 | Performed by: THORACIC SURGERY (CARDIOTHORACIC VASCULAR SURGERY)

## 2022-08-03 PROCEDURE — 84100 ASSAY OF PHOSPHORUS: CPT | Mod: 91 | Performed by: THORACIC SURGERY (CARDIOTHORACIC VASCULAR SURGERY)

## 2022-08-03 PROCEDURE — A4217 STERILE WATER/SALINE, 500 ML: HCPCS | Performed by: STUDENT IN AN ORGANIZED HEALTH CARE EDUCATION/TRAINING PROGRAM

## 2022-08-03 PROCEDURE — 63600175 PHARM REV CODE 636 W HCPCS

## 2022-08-03 PROCEDURE — 94003 VENT MGMT INPAT SUBQ DAY: CPT

## 2022-08-03 PROCEDURE — 86140 C-REACTIVE PROTEIN: CPT | Performed by: STUDENT IN AN ORGANIZED HEALTH CARE EDUCATION/TRAINING PROGRAM

## 2022-08-03 PROCEDURE — 99291 PR CRITICAL CARE, E/M 30-74 MINUTES: ICD-10-PCS | Mod: ,,, | Performed by: ANESTHESIOLOGY

## 2022-08-03 PROCEDURE — 25000003 PHARM REV CODE 250: Performed by: PHYSICIAN ASSISTANT

## 2022-08-03 PROCEDURE — 99900035 HC TECH TIME PER 15 MIN (STAT)

## 2022-08-03 PROCEDURE — 83880 ASSAY OF NATRIURETIC PEPTIDE: CPT | Performed by: STUDENT IN AN ORGANIZED HEALTH CARE EDUCATION/TRAINING PROGRAM

## 2022-08-03 PROCEDURE — 99291 CRITICAL CARE FIRST HOUR: CPT | Mod: ,,, | Performed by: ANESTHESIOLOGY

## 2022-08-03 PROCEDURE — 85018 HEMOGLOBIN: CPT | Performed by: STUDENT IN AN ORGANIZED HEALTH CARE EDUCATION/TRAINING PROGRAM

## 2022-08-03 PROCEDURE — 25000003 PHARM REV CODE 250: Performed by: THORACIC SURGERY (CARDIOTHORACIC VASCULAR SURGERY)

## 2022-08-03 PROCEDURE — 84295 ASSAY OF SERUM SODIUM: CPT

## 2022-08-03 PROCEDURE — 99232 PR SUBSEQUENT HOSPITAL CARE,LEVL II: ICD-10-PCS | Mod: ,,, | Performed by: INTERNAL MEDICINE

## 2022-08-03 PROCEDURE — 93750 PR INTERROGATE VENT ASSIST DEV, IN PERSON, W PHYSICIAN ANALYSIS: ICD-10-PCS | Mod: ,,, | Performed by: INTERNAL MEDICINE

## 2022-08-03 PROCEDURE — 84439 ASSAY OF FREE THYROXINE: CPT | Performed by: STUDENT IN AN ORGANIZED HEALTH CARE EDUCATION/TRAINING PROGRAM

## 2022-08-03 PROCEDURE — C9113 INJ PANTOPRAZOLE SODIUM, VIA: HCPCS

## 2022-08-03 PROCEDURE — 82803 BLOOD GASES ANY COMBINATION: CPT

## 2022-08-03 PROCEDURE — 85025 COMPLETE CBC W/AUTO DIFF WBC: CPT | Mod: 91 | Performed by: THORACIC SURGERY (CARDIOTHORACIC VASCULAR SURGERY)

## 2022-08-03 PROCEDURE — 82565 ASSAY OF CREATININE: CPT

## 2022-08-03 PROCEDURE — 85610 PROTHROMBIN TIME: CPT | Performed by: THORACIC SURGERY (CARDIOTHORACIC VASCULAR SURGERY)

## 2022-08-03 PROCEDURE — 83615 LACTATE (LD) (LDH) ENZYME: CPT | Performed by: STUDENT IN AN ORGANIZED HEALTH CARE EDUCATION/TRAINING PROGRAM

## 2022-08-03 PROCEDURE — 99291 CRITICAL CARE FIRST HOUR: CPT | Mod: 25,,, | Performed by: INTERNAL MEDICINE

## 2022-08-03 PROCEDURE — 84132 ASSAY OF SERUM POTASSIUM: CPT

## 2022-08-03 PROCEDURE — 94761 N-INVAS EAR/PLS OXIMETRY MLT: CPT

## 2022-08-03 PROCEDURE — 37799 UNLISTED PX VASCULAR SURGERY: CPT

## 2022-08-03 PROCEDURE — 82330 ASSAY OF CALCIUM: CPT

## 2022-08-03 RX ORDER — PREDNISONE 20 MG/1
20 TABLET ORAL ONCE
Status: COMPLETED | OUTPATIENT
Start: 2022-08-03 | End: 2022-08-03

## 2022-08-03 RX ORDER — POTASSIUM CHLORIDE 29.8 MG/ML
40 INJECTION INTRAVENOUS ONCE
Status: COMPLETED | OUTPATIENT
Start: 2022-08-03 | End: 2022-08-03

## 2022-08-03 RX ORDER — MAGNESIUM SULFATE HEPTAHYDRATE 40 MG/ML
2 INJECTION, SOLUTION INTRAVENOUS ONCE
Status: COMPLETED | OUTPATIENT
Start: 2022-08-03 | End: 2022-08-04

## 2022-08-03 RX ORDER — INSULIN ASPART 100 [IU]/ML
4 INJECTION, SOLUTION INTRAVENOUS; SUBCUTANEOUS EVERY 4 HOURS
Status: DISCONTINUED | OUTPATIENT
Start: 2022-08-03 | End: 2022-08-03

## 2022-08-03 RX ORDER — POTASSIUM CHLORIDE 29.8 MG/ML
40 INJECTION INTRAVENOUS ONCE AS NEEDED
Status: COMPLETED | OUTPATIENT
Start: 2022-08-03 | End: 2022-08-03

## 2022-08-03 RX ORDER — INSULIN ASPART 100 [IU]/ML
5 INJECTION, SOLUTION INTRAVENOUS; SUBCUTANEOUS EVERY 4 HOURS
Status: DISCONTINUED | OUTPATIENT
Start: 2022-08-03 | End: 2022-08-04

## 2022-08-03 RX ORDER — PREDNISONE 20 MG/1
20 TABLET ORAL ONCE
Status: DISCONTINUED | OUTPATIENT
Start: 2022-08-03 | End: 2022-08-03

## 2022-08-03 RX ORDER — MAGNESIUM SULFATE HEPTAHYDRATE 40 MG/ML
2 INJECTION, SOLUTION INTRAVENOUS ONCE
Status: COMPLETED | OUTPATIENT
Start: 2022-08-03 | End: 2022-08-03

## 2022-08-03 RX ORDER — PREDNISONE 20 MG/1
60 TABLET ORAL DAILY
Status: DISCONTINUED | OUTPATIENT
Start: 2022-08-04 | End: 2022-08-04

## 2022-08-03 RX ADMIN — METRONIDAZOLE 500 MG: 500 TABLET ORAL at 07:08

## 2022-08-03 RX ADMIN — METHIMAZOLE 20 MG: 10 TABLET ORAL at 08:08

## 2022-08-03 RX ADMIN — AMIODARONE HYDROCHLORIDE 400 MG: 200 TABLET ORAL at 02:08

## 2022-08-03 RX ADMIN — CEFEPIME 2 G: 2 INJECTION, POWDER, FOR SOLUTION INTRAVENOUS at 10:08

## 2022-08-03 RX ADMIN — AMIODARONE HYDROCHLORIDE 400 MG: 200 TABLET ORAL at 08:08

## 2022-08-03 RX ADMIN — CHOLESTYRAMINE 4 G: 4 POWDER, FOR SUSPENSION ORAL at 01:08

## 2022-08-03 RX ADMIN — MIDODRINE HYDROCHLORIDE 15 MG: 5 TABLET ORAL at 09:08

## 2022-08-03 RX ADMIN — INSULIN ASPART 3 UNITS: 100 INJECTION, SOLUTION INTRAVENOUS; SUBCUTANEOUS at 12:08

## 2022-08-03 RX ADMIN — POTASSIUM CHLORIDE 40 MEQ: 29.8 INJECTION, SOLUTION INTRAVENOUS at 01:08

## 2022-08-03 RX ADMIN — HYDROMORPHONE HYDROCHLORIDE 0.5 MG: 1 INJECTION, SOLUTION INTRAMUSCULAR; INTRAVENOUS; SUBCUTANEOUS at 07:08

## 2022-08-03 RX ADMIN — MEXILETINE HYDROCHLORIDE 400 MG: 200 CAPSULE ORAL at 09:08

## 2022-08-03 RX ADMIN — POLYETHYLENE GLYCOL 3350 17 G: 17 POWDER, FOR SOLUTION ORAL at 08:08

## 2022-08-03 RX ADMIN — SODIUM PHOSPHATE, MONOBASIC, MONOHYDRATE 20.01 MMOL: 276; 142 INJECTION, SOLUTION INTRAVENOUS at 02:08

## 2022-08-03 RX ADMIN — ACETYLCYSTEINE 4 ML: 100 SOLUTION ORAL; RESPIRATORY (INHALATION) at 01:08

## 2022-08-03 RX ADMIN — LEVALBUTEROL HYDROCHLORIDE 0.63 MG: 0.63 SOLUTION RESPIRATORY (INHALATION) at 08:08

## 2022-08-03 RX ADMIN — METRONIDAZOLE 500 MG: 500 TABLET ORAL at 09:08

## 2022-08-03 RX ADMIN — POTASSIUM CHLORIDE 40 MEQ: 29.8 INJECTION, SOLUTION INTRAVENOUS at 05:08

## 2022-08-03 RX ADMIN — INSULIN DETEMIR 6 UNITS: 100 INJECTION, SOLUTION SUBCUTANEOUS at 08:08

## 2022-08-03 RX ADMIN — HYDROMORPHONE HYDROCHLORIDE 0.5 MG: 1 INJECTION, SOLUTION INTRAMUSCULAR; INTRAVENOUS; SUBCUTANEOUS at 03:08

## 2022-08-03 RX ADMIN — INSULIN ASPART 1 UNITS: 100 INJECTION, SOLUTION INTRAVENOUS; SUBCUTANEOUS at 07:08

## 2022-08-03 RX ADMIN — GUAIFENESIN 300 MG: 100 SOLUTION ORAL at 05:08

## 2022-08-03 RX ADMIN — INSULIN ASPART 5 UNITS: 100 INJECTION, SOLUTION INTRAVENOUS; SUBCUTANEOUS at 08:08

## 2022-08-03 RX ADMIN — INSULIN ASPART 3 UNITS: 100 INJECTION, SOLUTION INTRAVENOUS; SUBCUTANEOUS at 11:08

## 2022-08-03 RX ADMIN — MIDODRINE HYDROCHLORIDE 15 MG: 5 TABLET ORAL at 01:08

## 2022-08-03 RX ADMIN — DEXMEDETOMIDINE HYDROCHLORIDE 0.6 MCG/KG/HR: 4 INJECTION INTRAVENOUS at 01:08

## 2022-08-03 RX ADMIN — ACETYLCYSTEINE 4 ML: 100 SOLUTION ORAL; RESPIRATORY (INHALATION) at 08:08

## 2022-08-03 RX ADMIN — INSULIN ASPART 3 UNITS: 100 INJECTION, SOLUTION INTRAVENOUS; SUBCUTANEOUS at 03:08

## 2022-08-03 RX ADMIN — CHOLESTYRAMINE 4 G: 4 POWDER, FOR SUSPENSION ORAL at 08:08

## 2022-08-03 RX ADMIN — INSULIN ASPART 3 UNITS: 100 INJECTION, SOLUTION INTRAVENOUS; SUBCUTANEOUS at 04:08

## 2022-08-03 RX ADMIN — SMOFLIPID 250 ML: 6; 6; 5; 3 INJECTION, EMULSION INTRAVENOUS at 10:08

## 2022-08-03 RX ADMIN — HEPARIN SODIUM 2200 UNITS/HR: 5000 INJECTION INTRAVENOUS; SUBCUTANEOUS at 07:08

## 2022-08-03 RX ADMIN — CHOLESTYRAMINE 4 G: 4 POWDER, FOR SUSPENSION ORAL at 05:08

## 2022-08-03 RX ADMIN — PANTOPRAZOLE SODIUM 40 MG: 40 INJECTION, POWDER, FOR SOLUTION INTRAVENOUS at 08:08

## 2022-08-03 RX ADMIN — DEXMEDETOMIDINE HYDROCHLORIDE 0.6 MCG/KG/HR: 4 INJECTION INTRAVENOUS at 07:08

## 2022-08-03 RX ADMIN — GUAIFENESIN 300 MG: 100 SOLUTION ORAL at 11:08

## 2022-08-03 RX ADMIN — MAGNESIUM SULFATE 2 G: 2 INJECTION INTRAVENOUS at 05:08

## 2022-08-03 RX ADMIN — MAGNESIUM SULFATE HEPTAHYDRATE: 500 INJECTION, SOLUTION INTRAMUSCULAR; INTRAVENOUS at 10:08

## 2022-08-03 RX ADMIN — Medication 0.04 MCG/KG/MIN: at 08:08

## 2022-08-03 RX ADMIN — Medication 0.04 MCG/KG/MIN: at 06:08

## 2022-08-03 RX ADMIN — INSULIN ASPART 3 UNITS: 100 INJECTION, SOLUTION INTRAVENOUS; SUBCUTANEOUS at 08:08

## 2022-08-03 RX ADMIN — MEXILETINE HYDROCHLORIDE 400 MG: 200 CAPSULE ORAL at 01:08

## 2022-08-03 RX ADMIN — LEVALBUTEROL HYDROCHLORIDE 0.63 MG: 0.63 SOLUTION RESPIRATORY (INHALATION) at 01:08

## 2022-08-03 RX ADMIN — GUAIFENESIN 300 MG: 100 SOLUTION ORAL at 12:08

## 2022-08-03 RX ADMIN — HEPARIN SODIUM 2200 UNITS/HR: 5000 INJECTION INTRAVENOUS; SUBCUTANEOUS at 06:08

## 2022-08-03 RX ADMIN — ASPIRIN 81 MG CHEWABLE TABLET 81 MG: 81 TABLET CHEWABLE at 08:08

## 2022-08-03 RX ADMIN — PREDNISONE 20 MG: 20 TABLET ORAL at 05:08

## 2022-08-03 RX ADMIN — MEXILETINE HYDROCHLORIDE 400 MG: 200 CAPSULE ORAL at 05:08

## 2022-08-03 RX ADMIN — PREDNISONE 40 MG: 20 TABLET ORAL at 08:08

## 2022-08-03 RX ADMIN — METRONIDAZOLE 500 MG: 500 TABLET ORAL at 01:08

## 2022-08-03 RX ADMIN — MAGNESIUM SULFATE 2 G: 2 INJECTION INTRAVENOUS at 11:08

## 2022-08-03 RX ADMIN — FUROSEMIDE 2.5 MG/HR: 10 INJECTION, SOLUTION INTRAMUSCULAR; INTRAVENOUS at 05:08

## 2022-08-03 RX ADMIN — MIDODRINE HYDROCHLORIDE 15 MG: 5 TABLET ORAL at 05:08

## 2022-08-03 NOTE — ASSESSMENT & PLAN NOTE
Key History and Diagnostic Findings  - History of AIT type 2 (TRAb and TSI negative; Thyroid US unremarkable with low vascularity)  - Weaned off methimazole and prednisone by May 2020, then developed hypothyroidism, LT4 started and dose titrated per TFTs. Prior to current admission he was on LT4 112 mcg daily  - Labs consistent with hypothyroidism until current admission, initially on 7/13, with low TSH and elevated fT4. Repeat TFTs on 7/27 with worsening hyperthyroidism. He continued to receive LT4 112 mcg through 7/28. He remains on amiodarone for episodes of VT  - Suspect current hyperthyroidism is AIT, however continued exogenous LT4 certainly contribute to current presentation   - Free T4 increased further to 3.46 from 3.0 (from 2.6 prior). Liver enzymes except LD remain normal  Lab Results   Component Value Date    TSH <0.010 (L) 07/27/2022    TSH 0.111 (L) 07/13/2022    TSH 4.079 (H) 03/17/2022    FREET4 3.46 (H) 08/04/2022    FREET4 3.03 (H) 08/03/2022    FREET4 2.59 (H) 08/02/2022   - Noted plans for tracheostomy tube today    Plan  - Strongly suspect AIT (type 1 or 2); continuing empiric treatment for both  - Increase Methimazole from 20 mg BID up to TID  - Increase Prednisone from 60 up to 80 mg daily  - Continue Cholestyramine 4g QID  - Check T3 now and tomorrow morning in conjunction with the free T4, depending on T3 result today may initiate SSKI 5 drops every 6 hours

## 2022-08-03 NOTE — NURSING
Pt in aflutter in the 90s;  notified. Pt remains hemodynamically unchanged. Labs sent and EKG obtained.  Verbal orders to notify MD if any changes in in hemodynamics and/or VAD numbers. Will continue to monitor.

## 2022-08-03 NOTE — ASSESSMENT & PLAN NOTE
Problem: Physical Therapy Goal  Goal: Physical Therapy Goal  Description: Goals to be met by: 2022     Patient will increase functional independence with mobility by performin. Sit to stand transfer with Supervision -not met  2. Bed to chair transfer with minimum -not met  3. Gait  x 100 feet with minimum  standing rest breaks prn. -not met  4. Lower extremity exercise program x30 reps per handout, with independence -not met  5. Recite 3/3 sternal precautions and remain complaint with precautions throughout session with no verbal cues -not met  6. Pt sit on EOB x 10 min with CGA - not met      Outcome: Ongoing, Progressing   Goals remain appropriate.. 2022     - Levothyroxine discontinued on 7/28/2022  - Please see plan as above

## 2022-08-03 NOTE — PROGRESS NOTES
[facility]  Heart Failure  Progress Note     Hospital Length of Stay: 37    Interval History:  - No significant events overnight.   - He did not have fever in last 24 hours.   - No significant event in LVAD recorded.       Vitals:  Temp:  [98.24 °F (36.8 °C)-99.68 °F (37.6 °C)] 99.32 °F (37.4 °C)  Pulse:  [] 95  Resp:  [24] 24  SpO2:  [9 %-99 %] 98 %  BP: (76-80)/(0) 76/0  Arterial Line BP: (78-95)/(64-83) 92/81    Intake/Output    Intake/Output Summary (Last 24 hours) at 8/3/2022 1153  Last data filed at 8/3/2022 1100  Gross per 24 hour   Intake 3126.09 ml   Output 3745 ml   Net -618.91 ml       Physical Exam  Gen: Intubated and following some command.   HEENT: Pupils equal and reactive to light  Cardio: Regular rate, point of maximal impulse not displaced,1+ radial pulses bilaterally, 1+ DP pulses bilaterally, VAD hum obstructing heart sounds  Resp: crackles, rhonchi  Abd: Soft, non-tender, non-distended  : CLEO drainage  Skin: Warm,  trace peripheral edema  Neuro: intubated. Unable to assess.   Psych: unable to assess     Labs:  Recent Labs   Lab 08/02/22  1219 08/02/22 1954 08/03/22  0324   WBC 19.51* 19.35* 20.75*   HGB 7.3* 7.3* 7.2*   HCT 24.3* 24.4* 24.3*    340 324     Recent Labs   Lab 08/02/22  1219 08/02/22 1954 08/03/22  0324    143 141   K 4.3 4.5 3.6    112* 110   CO2 24 21* 20*   BUN 65* 68* 61*   CREATININE 1.5* 1.5* 1.3   * 194* 160*   CALCIUM 8.8 9.1 8.5*   MG 2.6 2.5 2.0   PHOS 2.8 2.9 2.3*       Micro:    Current Meds:   acetylcysteine 100 mg/ml (10%)  4 mL Nebulization Q6H    amiodarone  400 mg Oral TID    aspirin  81 mg Per OG tube Daily    ceFEPime (MAXIPIME) IVPB  2 g Intravenous Q12H    cholestyramine  1 packet Per NG tube QID    guaiFENesin 100 mg/5 ml  300 mg Per NG tube Q6H    insulin aspart U-100  3 Units Subcutaneous Q4H    levalbuterol  0.63 mg Nebulization Q6H    lipid (SMOFLIPID)  250 mL Intravenous Daily    methIMAzole   20 mg Per NG tube BID    metroNIDAZOLE  500 mg Oral Q8H    mexiletine  400 mg Oral Q8H    midodrine  15 mg Per NG tube Q8H    pantoprazole  40 mg Intravenous BID    polyethylene glycol  17 g Per NG tube BID    predniSONE  40 mg Per NG tube Daily       Continuous Infusions:   dexmedetomidine (PRECEDEX) infusion 0.6 mcg/kg/hr (08/03/22 1100)    dextrose 10 % in water (D10W)      EPINEPHrine 0.04 mcg/kg/min (08/03/22 1100)    furosemide (LASIX) 2 mg/mL continuous infusion (non-titrating) 2.5 mg/hr (08/03/22 1100)    heparin (porcine) in 5 % dex 2,200 Units/hr (08/03/22 1100)    nitric oxide gas      TPN ADULT CENTRAL LINE CUSTOM 40 mL/hr at 08/03/22 1100    TPN ADULT CENTRAL LINE CUSTOM      vasopressin 0.02 Units/min (08/03/22 1100)         Assessment and Plan:    Cardiogenic Shock  -Previous HM2, underwent pump exchange to HM3 on 7/13/22 complicated by worsening CS/RV failure requiring VA ECMO now decannulated  -Re-intubated on 7/29/22 due to hypercapnic resp failure  -Currently on Epi 0.04, vaso 0.04  -CTS primary.   - NO has been weaned off.   - Plan for tracheostomy tomorrow.     LVAD  TXP LVAD INTERROGATIONS 7/31/2022 7/31/2022 7/31/2022 7/31/2022 7/31/2022 7/31/2022 7/31/2022   Type HeartMate3 HeartMate3 HeartMate3 HeartMate3 HeartMate3 HeartMate3 HeartMate3   Flow 5.3 5.5 5.3 5.3 5.3 5.4 5.5   Speed 6200 6200 6200 6200 6200 6200 6200   PI 3.2 3.3 3.3 3.3 3.1 3.2 3.1   Power (Jackson) 5.2 5.1 5.1 5.2 5.1 5.1 5.1   LSL 5800 - - - 5800 - -   Low Flow Alarm - - - - - - -   Pulsatility Intermittent pulse - - - Intermittent pulse - -         Anxiety  -Currently intubated and sedated     History of ventricular tachycardia/Episode of A flutter 2:1 block  Patient experienced an episode of VT on 6/30 and experienced an ICD shock thought to be related to hypokalemia (K of 3.1 on 6/30)  - Aggressively replete K, keep K>4 and Mg>2  - Continue mexiletine PO, lidocaine gtt.  - Amio gtt transitioned to PO.         COVID-19 virus infection  -Previously hypoxic then recovered  -ID was consulted, recommended Remdesivir, course completed     Elevated serum lactate dehydrogenase (LDH)  S/p HM3 exchange for HM2 pump thrombosis  LDH normalized.      amiodarone induced hypothyrodism initially, now hyperthyroidism  -Endocrine team on board.  -On prednisone and methimazole.   -      Chronic combined systolic and diastolic heart failure  ADHF  Underwent HM III upgrade on 7/13/22, currently on VA ECMO  Currently on Epi gtt, Vasopressin  Currently on Precedex  gtt for sedation  Being treated for sepsis.   On cefepime and flagyl.  Currently intubated and sedated (reintubarted 7/29)  Chest tube removal, bronch, and old driveline removed 7/22      Scot Card MD, FACP  Cardiovascular Disease Fellow PGY4  [facility]

## 2022-08-03 NOTE — ASSESSMENT & PLAN NOTE
Key History and Diagnostic Findings  - Hyperglycemia noted once starting TPN in addition to steroids  - No prior history of diabetes; most recent A1c of 5.4 on 04/26/2021    Plan  - Started levemir 6 units QHS yesterday evening  - Decrease aspart from 5 down to 4 units q4h with low correction while on TPN  - Please notify endocrine if any change in TPN as this will change insulin requirements  - Please ensure that accuchecks are being documented q4h per order

## 2022-08-03 NOTE — PROGRESS NOTES
08/03/2022  Carissa Dean    Current provider:  Yg Kaufman MD    Device interrogation:  TXP LVAD INTERROGATIONS 8/3/2022 8/3/2022 8/3/2022 8/3/2022 8/3/2022 8/3/2022 8/3/2022   Type HeartMate3 HeartMate3 HeartMate3 HeartMate3 HeartMate3 HeartMate3 HeartMate3   Flow 5.4 5.4 5.4 5.5 5.5 5.5 5.4   Speed 6300 6300 6300 6300 6300 6300 6300   PI 3.0 3.1 3.1 3.2 3.1 3.1 3.3   Power (Jackson) 5.4 5.3 5.4 5.3 5.4 5.3 5.3   LSL 5900 5900 5900 5900 5900 5900 5900   Low Flow Alarm - - - - - - -   Pulsatility No Pulse No Pulse No Pulse No Pulse No Pulse No Pulse No Pulse          Rounded on Tim Richards to ensure all mechanical assist device settings (IABP or VAD) were appropriate and all parameters were within limits.  I was able to ensure all back up equipment was present, the staff had no issues, and the Perfusion Department daily rounding was complete.      For implantable VADs: Interrogation of Ventricular assist device was performed with analysis of device parameters and review of device function. I have personally reviewed the interrogation findings and agree with findings as stated.     In emergency, the nursing units have been notified to contact the perfusion department either by:  Calling [phone] from 630am to 4pm Mon thru Fri, utilizing the On-Call Finder functionality of Epic and searching for Perfusion, or by contacting the hospital  from 4pm to 630am and on weekends and asking to speak with the perfusionist on call.    12:42 PM

## 2022-08-03 NOTE — PROGRESS NOTES
I have reviewed and concur with Dr. Rodriguez's history, physical, assessment, and plan.  I have personally interviewed and examined the patient.  See below addendum for my evaluation and additional findings.    Patient with rise in free T4 today with no missed doses methimazole, prednisone, or cholestyramine.  Given high suspicion for type 2 AIT will increased prednisone today continue to monitor daily free T4.    Wendy Casper MD

## 2022-08-03 NOTE — ANESTHESIA PREPROCEDURE EVALUATION
[facility]  Anesthesia Pre-Operative Evaluation         Patient Name: Tim Richards  YOB: 1965  MRN: [id number]    SUBJECTIVE:     Pre-operative evaluation for Procedure(s) (LRB):  CREATION, TRACHEOSTOMY (N/A)     08/03/2022    Tim Richards is a 56 y.o. male w/ a significant PMHx of HFrEF (EF 10%) s/p HM2 LVAD in 2018 and now s/p HM3 upgrade, initiation of V-A ECMO after failure to wean off bypass x2.  Patient undergoing the above procedure 2/2 extended intubation.      LDA:   Percutaneous Central Line Insertion/Assessment - Quad Lumen  07/21/22 1515 right internal jugular (Active)   Line Necessity Review Large/frequent boluses;Hemodynamic instability 08/02/22 0730   $ Central Line Charges (Upon insertion) Bedside Insertion Performed Pt > 5 Years Old;Catheter - Quad Lumen (Supply) 07/21/22 1515   Verification by X-ray Yes 08/02/22 1915   Site Assessment No drainage;No redness;No swelling;No warmth 08/02/22 1915   Line Securement Device Secured with sutures 08/02/22 1915   Dressing Type Biopatch in place 08/03/22 0315   Dressing Status Clean;Dry;Intact 08/03/22 0315   Dressing Intervention Integrity maintained 08/03/22 0315   Date on Dressing 08/02/22 08/02/22 1915   Dressing Due to be Changed 08/09/22 08/02/22 1915   Distal Patency/Care infusing 08/03/22 0315   Medial 1 Patency/Care infusing 08/03/22 0315   Medial 2 Patency/Care infusing 08/03/22 0315   Proximal 1 Patency/Care infusing 08/03/22 0315   Waveform Normal 08/02/22 1508   Number of days: 12            Peripheral IV - Single Lumen 07/30/22 0648 22 G Posterior;Right Hand (Active)   Site Assessment Clean;Dry;Intact;No redness;No swelling 08/03/22 0315   Extremity Assessment Distal to IV No warmth;No swelling;No redness;No abnormal discoloration 08/03/22 0315   Line Status Infusing 08/03/22 0315   Dressing Status Clean;Dry;Intact 08/03/22 0315   Dressing Intervention Integrity maintained 08/03/22 0315   Dressing  Change Due 08/03/22 08/03/22 0315   Site Change Due 08/03/22 08/02/22 1915   Reason Not Rotated Not due 08/03/22 0315   Number of days: 4            Midline Catheter Insertion/Assessment  - Single Lumen 07/21/22 1616 Left basilic vein (medial side of arm) 18g x 10cm (Active)   Site Assessment Clean;Dry;Intact;No redness;No swelling 08/03/22 0315   IV Device Securement catheter securement device 08/03/22 0315   Line Status Infusing 08/03/22 0315   Dressing Type Biopatch in place 08/03/22 0315   Dressing Status Clean;Dry;Intact 08/03/22 0315   Dressing Intervention Integrity maintained 08/03/22 0315   Dressing Change Due 08/09/22 08/03/22 0315   Site Change Due 08/19/22 08/02/22 1915   Reason Not Rotated Not due 08/03/22 0315   Number of days: 12       Arterial Line 07/30/22 0900 Right Radial (Active)   $ Arterial Line Charges (Upon Insertion) Bedside Insertion Performed 07/25/22 0315   Site Assessment Clean;Dry;Intact 08/03/22 0315   Line Status Pulsatile blood flow 08/03/22 0315   Art Line Waveform Appropriate 08/03/22 0315   Arterial Line Interventions Zeroed and calibrated 08/03/22 0315   Color/Movement/Sensation Capillary refill less than 3 sec 08/03/22 0315   Dressing Type Central line dressing 08/03/22 0315   Dressing Status Clean;Dry;Intact 08/03/22 0315   Dressing Intervention Sterile dressing change 08/03/22 0315   Dressing Change Due 08/07/22 08/03/22 0315   Tubing Change Due 08/07/22 08/03/22 0315   Number of days: 3            VAD 07/13/22 1300 Left ventricular assist device HeartMate 3 (Active)   $ Ventricular Assist Device (VAD) Supplies LVAD Kit (30 Day Supply) 07/25/22 1500   Site Location Abdomen left 08/03/22 0315   Site Assessment Clean;Dry;Intact 08/03/22 0315   Driveline Exit Site 1 08/02/22 1915   Driveline Assessment Free of Kinks;Intact 08/03/22 0315   Dressing Status Clean;Dry;Intact 08/03/22 0315   Dressing Intervention Integrity maintained 08/03/22 0315   Performed By RN 08/02/22 9548  "  Dressing Change Schedule Daily 08/02/22 1508   Dressing Change Due 08/03/22 08/02/22 1508   Driveline Aguila in use [place] deng 08/02/22 0730   Condition CDI 08/02/22 0730   Date changed 07/31/22 08/01/22 1105   Number of days: 20            NG/OG Tube 07/15/22 1030 Left nostril (Active)   Placement Check placement verified by aspirate characteristics;placement verified by distal tube length measurement;placement verified by x-ray 08/02/22 1508   Tolerance no signs/symptoms of discomfort 08/03/22 0315   Securement secured to nostril center 08/03/22 0315   Clamp Status/Tolerance unclamped 08/03/22 0315   Suction Setting/Drainage Method suction at;low;intermittent setting 08/02/22 1508   Insertion Site Appearance no redness, warmth, tenderness, skin breakdown, drainage 08/02/22 1508   Drainage Bile 08/02/22 1508   Flush/Irrigation flushed w/;water;no resistance met 08/02/22 1508   Feeding Type continuous;by pump 07/28/22 1505   Feeding Action feeding held 08/01/22 1505   Current Rate (mL/hr) 20 mL/hr 07/27/22 0300   Goal Rate (mL/hr) 30 mL/hr 07/27/22 0300   Intake (mL) 80 mL 08/01/22 1000   Water Bolus (mL) 200 mL 08/02/22 2300   Tube Output(mL)(Include Discarded Residual) 200 mL 08/01/22 1800   Rate Formula Tube Feeding (mL/hr) 10 mL/hr 07/26/22 1901   Formula Name Peptamen 07/27/22 0300   Intake (mL) - Formula Tube Feeding 60 07/30/22 1800   Residual Amount (ml) 35 ml 08/02/22 2315   Number of days: 18            Urethral Catheter 07/27/22 1536 Temperature probe 16 Fr. (Active)   Site Assessment Clean;Intact 08/03/22 0315   Collection Container Urimeter 08/03/22 0315   Securement Method secured to top of thigh w/ adhesive device 08/03/22 0315   Catheter Care Performed yes 08/03/22 0315   Reason for Continuing Urinary Catheterization Critically ill in ICU and requiring hourly monitoring of intake/output 08/03/22 0315   CAUTI Prevention Bundle Securement Device in place with 1" slack 08/02/22 6285   Output (mL) " 215 mL 08/03/22 0800   Number of days: 6       Prev airway: Method of Intubation: Video Laryngoscopy; Mask Ventilation: Easy - oral; Intubated: Postinduction; Blade: Booth #3; Airway Device Size: 7.5; Placement Verified By: Capnometry; Complicating Factors: None; Intubation Findings: Bilateral breath sounds, Atraumatic/Condition of teeth unchanged; Securement: Lips; Complications: None    Drips:    dexmedetomidine (PRECEDEX) infusion 0.6 mcg/kg/hr (08/03/22 0800)    dextrose 10 % in water (D10W)      EPINEPHrine 0.04 mcg/kg/min (08/03/22 0854)    furosemide (LASIX) 2 mg/mL continuous infusion (non-titrating) 2.5 mg/hr (08/03/22 0800)    heparin (porcine) in 5 % dex 2,200 Units/hr (08/03/22 0800)    nitric oxide gas      TPN ADULT CENTRAL LINE CUSTOM 40 mL/hr at 08/03/22 0800    vasopressin 0.04 Units/min (08/03/22 0800)       Patient Active Problem List   Diagnosis    Acute on chronic systolic congestive heart failure    Cigarette nicotine dependence without complication    Alcohol abuse    Pulmonary nodule seen on imaging study    Chronic combined systolic and diastolic heart failure    High risk medications (amiodarone) long-term use    Acute decompensated heart failure    CKD (chronic kidney disease), stage III    Hypertensive cardiovascular-renal disease, stage 1-4 or unspecified chronic kidney disease, with heart failure    Palliative care encounter    LVAD (left ventricular assist device) present    Hyperglycemia    Hyperbilirubinemia    Anemia    Hypertension    Anticoagulation monitoring, INR range 2-3    Left ventricular assist device (LVAD) complication    Pre-transplant evaluation for heart transplant    GIB (gastrointestinal bleeding)    Leukocytosis    Thrombocytopenia, unspecified    GI bleed    History of bleeding ulcers    Shortness of breath    VT (ventricular tachycardia)    Class 1 obesity due to excess calories with serious comorbidity and body mass index (BMI)  of 32.0 to 32.9 in adult    amiodarone induced hypothyrodism    Heart replaced by heart assist device    Amiodarone-induced hyperthyroidism    Substance or medication-induced sleep disorder, insomnia type    VF (ventricular fibrillation)    Elevated serum lactate dehydrogenase (LDH)    Essential hypertension    CHICHI (obstructive sleep apnea)    Gout    Dilated cardiomyopathy    Acute blood loss anemia    Implantable cardioverter-defibrillator (ICD) discharge    Post traumatic stress disorder (PTSD)    Diuretic-induced hypokalemia    COVID-19 virus infection    Acute on chronic combined systolic and diastolic congestive heart failure    History of ventricular tachycardia    Anxiety    Advance care planning    Personal history of nicotine dependence    WAGNER (acute kidney injury)    Moderate protein-calorie malnutrition    Metabolic alkalosis    Hypernatremia    Personal history of extracorporeal membrane oxygenation (ECMO)    Encounter for weaning from ventilator    Shock    Acute hypoxemic respiratory failure    A-fib    Acute hypercapnic respiratory failure    Atrial flutter       Review of patient's allergies indicates:   Allergen Reactions    Lisinopril Anaphylaxis    Hydralazine analogues      Chronic constipation, impotence, dizziness       Current Inpatient Medications:   acetylcysteine 100 mg/ml (10%)  4 mL Nebulization Q6H    amiodarone  400 mg Oral TID    aspirin  81 mg Per OG tube Daily    ceFEPime (MAXIPIME) IVPB  2 g Intravenous Q12H    cholestyramine  1 packet Per NG tube QID    guaiFENesin 100 mg/5 ml  300 mg Per NG tube Q6H    insulin aspart U-100  3 Units Subcutaneous Q4H    levalbuterol  0.63 mg Nebulization Q6H    lipid (SMOFLIPID)  250 mL Intravenous Daily    methIMAzole  20 mg Per NG tube BID    metroNIDAZOLE  500 mg Oral Q8H    mexiletine  400 mg Oral Q8H    midodrine  15 mg Per NG tube Q8H    pantoprazole  40 mg Intravenous BID    polyethylene glycol   17 g Per NG tube BID    predniSONE  40 mg Per NG tube Daily       No current facility-administered medications on file prior to encounter.     Current Outpatient Medications on File Prior to Encounter   Medication Sig Dispense Refill    allopurinoL (ZYLOPRIM) 100 MG tablet TAKE 1 TABLET (100 MG) BY MOUTH NIGHTLY. 90 tablet 3    amiodarone (PACERONE) 200 MG Tab Take 2 tablets (400 mg total) by mouth once daily. 180 tablet 3    amLODIPine (NORVASC) 10 MG tablet TAKE 1 TABLET BY MOUTH EVERY DAY 90 tablet 3    aspirin (ECOTRIN) 81 MG EC tablet Take 1 tablet (81 mg total) by mouth once daily. 30 tablet 11    busPIRone (BUSPAR) 5 MG Tab Take 1 tablet (5 mg total) by mouth 3 (three) times daily. 90 tablet 1    levothyroxine (SYNTHROID) 112 MCG tablet Take 1 tablet (112 mcg total) by mouth before breakfast. 90 tablet 3    mexiletine (MEXITIL) 250 MG Cap Take 1 capsule (250 mg total) by mouth every 8 (eight) hours. 270 capsule 3    pantoprazole (PROTONIX) 40 MG tablet TAKE 1 TABLET (40 MG TOTAL) BY MOUTH DAILY WITH LUNCH. 90 tablet 3    warfarin (COUMADIN) 5 MG tablet TAKE 7.5 MG ORALLY DAILY EVERY SUNDAY AND THURSDAY, TAKE 5 MG ORALLY DAILY ON OTHER DAYS 135 tablet 3    [DISCONTINUED] ferrous gluconate (FERGON) 324 MG tablet TAKE 1 TABLET (324 MG TOTAL) BY MOUTH WITH LUNCH. 90 tablet 3    [DISCONTINUED] sildenafiL (VIAGRA) 100 MG tablet Take 1 tablet (100 mg total) by mouth daily as needed for Erectile Dysfunction. 6 tablet 3    [DISCONTINUED] torsemide (DEMADEX) 20 MG Tab Take 1 tablet (20 mg total) by mouth once daily. 180 tablet 3       Past Surgical History:   Procedure Laterality Date    AORTIC VALVULOPLASTY N/A 7/13/2022    Procedure: REPAIR, AORTIC VALVE;  Surgeon: Yg Kaufman MD;  Location: [facility] OR [address] [place] [address];  Service: Cardiovascular;  Laterality: N/A;    APPLICATION OF WOUND VACUUM-ASSISTED CLOSURE DEVICE N/A 7/15/2022    Procedure: APPLICATION, WOUND VAC;  Surgeon: Yg Kaufman MD;  Location:  [facility] OR 2ND FLR;  Service: Cardiovascular;  Laterality: N/A;  50 x 5 cm     CARDIAC CATHETERIZATION  Dec. 2012    CARDIAC DEFIBRILLATOR PLACEMENT Left     CRRT-D    COLONOSCOPY N/A 3/6/2018    Procedure: COLONOSCOPY;  Surgeon: Alonso Bone MD;  Location: [facility] ENDO (2ND FLR);  Service: Endoscopy;  Laterality: N/A;    COLONOSCOPY N/A 7/17/2019    Procedure: COLONOSCOPY;  Surgeon: Blane Valdez MD;  Location: [facility] ENDO (2ND FLR);  Service: Endoscopy;  Laterality: N/A;    COLONOSCOPY N/A 7/18/2019    Procedure: COLONOSCOPY;  Surgeon: Blane Valdez MD;  Location: [facility] ENDO (2ND FLR);  Service: Endoscopy;  Laterality: N/A;    ESOPHAGOGASTRODUODENOSCOPY N/A 7/17/2019    Procedure: EGD (ESOPHAGOGASTRODUODENOSCOPY);  Surgeon: Blane Valdez MD;  Location: [facility] ENDO (2ND FLR);  Service: Endoscopy;  Laterality: N/A;    ESOPHAGOGASTRODUODENOSCOPY N/A 7/18/2019    Procedure: EGD (ESOPHAGOGASTRODUODENOSCOPY);  Surgeon: Blane Valdez MD;  Location: [facility] ENDO (2ND FLR);  Service: Endoscopy;  Laterality: N/A;    FOREIGN BODY REMOVAL N/A 7/22/2022    Procedure: REMOVAL, FOREIGN BODY;  Surgeon: Yg Kaufman MD;  Location: [facility] OR 2ND FLR;  Service: Cardiovascular;  Laterality: N/A;  LVAD Heartmate 2 drive line removal    INSERTION OF GRAFT TO PERICARDIUM N/A 7/15/2022    Procedure: INSERTION, GRAFT, PERICARDIUM;  Surgeon: Yg Kaufman MD;  Location: [facility] OR 2ND FLR;  Service: Cardiovascular;  Laterality: N/A;    IRRIGATION OF MEDIASTINUM N/A 7/15/2022    Procedure: IRRIGATION, MEDIASTINUM;  Surgeon: Yg Kaufman MD;  Location: [facility] OR 2ND FLR;  Service: Cardiovascular;  Laterality: N/A;    LYSIS OF ADHESIONS  7/13/2022    Procedure: LYSIS, ADHESIONS;  Surgeon: Yg Kaufman MD;  Location: [facility] OR 2ND FLR;  Service: Cardiovascular;;    NONINVASIVE CARDIAC ELECTROPHYSIOLOGY STUDY N/A 10/18/2019    Procedure: CARDIAC ELECTROPHYSIOLOGY STUDY, NONINVASIVE;  Surgeon: Raz Wagner MD;  Location: [facility] LAB;  Service:  Cardiology;  Laterality: N/A;  VT, DFTs, MDT CRTD in situ, LVAD, juarez, MB, 3098    REPLACEMENT OF IMPLANTABLE CARDIOVERTER-DEFIBRILLATOR (ICD) GENERATOR N/A 3/9/2020    Procedure: REPLACEMENT, ICD GENERATOR;  Surgeon: Harry Yun MD;  Location: [facility] EP LAB;  Service: Cardiology;  Laterality: N/A;  VT, ICD Gen Change and Lead Revision, MDT, MAC, DM,3 Prep    REPLACEMENT OF LEFT VENTRICULAR ASSIST DEVICE (LVAD)  7/13/2022    Procedure: REPLACEMENT, LVAD;  Surgeon: Yg Kaufman MD;  [facility];  Service: Cardiovascular;;    REPLACEMENT OF PUMP N/A 7/13/2022    Procedure: REPLACEMENT, PUMP;  Surgeon: Yg Kaufman MD;  [facility];  Service: Cardiovascular;  Laterality: N/A;  LVAD pump exchange  EXPLANATION OF HEATMATE 2  IMPLANTATION OF HEARTMATE 3  IMPLANTATION OF 8MM CHIMNEY GRAFT TO RFA  INITIATION OF ECMO  TEMPORARY CLOSURE OF CHEST    REVISION OF IMPLANTABLE CARDIOVERTER-DEFIBRILLATOR (ICD) ELECTRODE LEAD PLACEMENT N/A 3/9/2020    Procedure: REVISION, INSERTION, ELECTRODE LEAD, ICD;  Surgeon: Harry Yun MD;  Location: [facility] EP LAB;  Service: Cardiology;  Laterality: N/A;  VT, ICD Gen Change and Lead Revision, MDT, MAC, DM,3 Prep    STERNAL WOUND CLOSURE N/A 7/14/2022    Procedure: CLOSURE, WOUND, STERNUM;  Surgeon: Yg Kaufman MD;  [facility];  Service: Cardiovascular;  Laterality: N/A;  temporary closure  evacuation of hematoma    STERNAL WOUND CLOSURE N/A 7/15/2022    Procedure: CLOSURE, WOUND, STERNUM;  Surgeon: Yg Kaufman MD;  [facility];  Service: Cardiovascular;  Laterality: N/A;    TREATMENT OF CARDIAC ARRHYTHMIA  10/18/2019    Procedure: Cardioversion or Defibrillation;  Surgeon: Raz Wagner MD;  Location: [facility] EP LAB;  Service: Cardiology;;       Social History     Socioeconomic History    Marital status:     Number of children: 3   Occupational History     Employer: [employer]    Tobacco Use     Smoking status: Former Smoker     Packs/day: 1.00     Years: 31.00     Pack years: 31.00     Types: Cigarettes     Quit date: 2018     Years since quittin.5    Smokeless tobacco: Never Used    Tobacco comment: pt is quiting on his own - pt stated not qualified for program;  pt  quit on his own   Substance and Sexual Activity    Alcohol use: No     Alcohol/week: 0.0 standard drinks     Comment: quit    Drug use: No    Sexual activity: Yes     Partners: Female     Birth control/protection: None     Comment: 10/5/17  with same partner 7 years    Social History Narrative    Disabled       OBJECTIVE:     Vital Signs Range (Last 24H):  Temp:  [36.4 °C (97.5 °F)-37.6 °C (99.68 °F)]   Pulse:  []   Resp:  [24]   BP: (76-80)/(0)   SpO2:  [9 %-99 %]   Arterial Line BP: (74-95)/(64-83)       Significant Labs:  Lab Results   Component Value Date    WBC 20.75 (H) 2022    HGB 7.2 (L) 2022    HCT 24.3 (L) 2022     2022    CHOL 130 2022    TRIG 75 2022    HDL 46 2022    ALT 32 2022    AST 32 2022     2022    K 3.6 2022     2022    CREATININE 1.3 2022    BUN 61 (H) 2022    CO2 20 (L) 2022    TSH <0.010 (L) 2022    PSA 0.99 2018    INR 1.3 (H) 2022    HGBA1C 5.4 2021       Diagnostic Studies: No relevant studies.    EKG:   Results for orders placed or performed during the hospital encounter of 22   EKG 12-lead    Collection Time: 22 11:03 AM    Narrative    Test Reason :     Vent. Rate : 157 BPM     Atrial Rate : 156 BPM     P-R Int : 000 ms          QRS Dur : 180 ms      QT Int : 316 ms       P-R-T Axes : 000 128 121 degrees     QTc Int : 510 ms    Baseline Artifact  repeat EKG  Confirmed by KIM SHERIFF MD ([id number]) on 2022 8:35:21 AM    Referred By: [email or web address]   SELF           Confirmed By:KIM SHERIFF MD       2D ECHO:  TTE:  Results for orders placed or  performed during the hospital encounter of 06/27/22   Echo   Result Value Ref Range    Ascending aorta 2.87 cm    STJ 3.03 cm    IVS 1.20 (A) 0.6 - 1.1 cm    LA size 3.83 cm    Left Atrium Major Axis 8.04 cm    Left Atrium Minor Axis 7.23 cm    LVIDd 7.50 (A) 3.5 - 6.0 cm    LVIDs 7.39 (A) 2.1 - 4.0 cm    LVOT diameter 1.98 cm    Posterior Wall 0.89 0.6 - 1.1 cm    RA Major Axis 6.38 cm    RA Width 5.14 cm    RVDD 5.10 cm    Sinus 2.99 cm    TAPSE 2.20 cm    TR Max Jorge 3.03 m/s    TDI LATERAL 0.09 m/s    TDI SEPTAL 0.03 m/s    LA WIDTH 3.32 cm    LV Diastolic Volume 297.91 mL    LV Systolic Volume 288.44 mL    FS 1 %    LA volume 82.29 cm3    LV mass 383.40 g    Left Ventricle Relative Wall Thickness 0.24 cm    Mean e' 0.06 m/s    LVOT area 3.1 cm2    LV Systolic Volume Index 126.0 mL/m2    LV Diastolic Volume Index 130.09 mL/m2    LA Volume Index 35.9 mL/m2    LV Mass Index 167 g/m2    Triscuspid Valve Regurgitation Peak Gradient 37 mmHg    BSA 2.32 m2    Right Atrial Pressure (from IVC) 8 mmHg    EF 13 %    TV rest pulmonary artery pressure 45 mmHg    Narrative    · There is an LVAD present. Base speed is 6400 RPMs. The pump type is a   Heartmate III. The interventricular septum appears midline. The aortic   valve does not open.  · The left ventricle is severely enlarged with eccentric hypertrophy and   severely decreased systolic function.  · The estimated ejection fraction is 13%.  · There is abnormal septal wall motion.  · There is severe left ventricular global hypokinesis.  · Left ventricular diastolic dysfunction.  · Mild left atrial enlargement.  · Moderate right ventricular enlargement with mildly reduced right   ventricular systolic function.  · Moderate right atrial enlargement.  · Moderate tricuspid regurgitation.  · Intermediate central venous pressure (8 mmHg).  · The estimated PA systolic pressure is 45 mmHg.  · There is pulmonary hypertension.          BRITTANY:  Results for orders placed or performed  during the hospital encounter of 07/01/20   Transesophageal echo (BRITTANY)   Result Value Ref Range    BSA 2.5 m2    Narrative    · 1 cm circumferential pericardial fat pad; unchanged prior to or   following procedure.  · RA, RV and CS leads converge on lateral aspect of SVC prior to removal   during this device extraction.  · HeartMate II in place. AV does not open.  · Severely decreased left ventricular systolic function with global   hypokinesis. The estimated ejection fraction is 15%.  · Sucessfull removal of all endovascular hardware without pericardial   effusion.          ASSESSMENT/PLAN:                                                                                                                  08/03/2022  Tim Richards is a 56 y.o., male.      Pre-op Assessment    I have reviewed the Patient Summary Reports.     I have reviewed the Nursing Notes. I have reviewed the NPO Status.   I have reviewed the Medications.     Review of Systems  Social:  Former Smoker, No Alcohol Use    Cardiovascular:   Hypertension Dysrhythmias atrial fibrillation CHF    Pulmonary:   Shortness of breath Sleep Apnea    Renal/:   Chronic Renal Disease    Endocrine:   Hypothyroidism    Psych:   Psychiatric History          Physical Exam  General: Well nourished and Cooperative    Airway:  Pre-Existing Airway: Oral Endotracheal tube    Dental:  Intact        Anesthesia Plan  Type of Anesthesia, risks & benefits discussed:    Anesthesia Type: Gen ETT  Intra-op Monitoring Plan: Standard ASA Monitors  Post Op Pain Control Plan: multimodal analgesia and IV/PO Opioids PRN  Induction:  IV  Airway Plan: Via Tracheostomy and Direct  Informed Consent: Informed consent signed with the Patient representative and all parties understand the risks and agree with anesthesia plan.  All questions answered.   ASA Score: 4  Day of Surgery Review of History & Physical: H&P Update referred to the surgeon/provider.    Ready For Surgery From Anesthesia  Perspective.     .

## 2022-08-03 NOTE — ASSESSMENT & PLAN NOTE
Key History and Diagnostic Findings  - History of AIT type 2 (TRAb and TSI negative; Thyroid US unremarkable with low vascularity)  - Weaned off methimazole and prednisone by May 2020, then developed hypothyroidism, LT4 started and dose titrated per TFTs. Prior to current admission he was on LT4 112 mcg daily  - Labs consistent with hypothyroidism until current admission, initially on 7/13, with low TSH and elevated fT4. Repeat TFTs on 7/27 with worsening hyperthyroidism. He continued to receive LT4 112 mcg through 7/28. He remains on amiodarone for episodes of VT  - Suspect current hyperthyroidism is AIT, however continued exogenous LT4 certainly contribute to current presentation   - Free T4 increased to 3.0 from 2.6. Liver enzymes remain normal  Lab Results   Component Value Date    TSH <0.010 (L) 07/27/2022    TSH 0.111 (L) 07/13/2022    TSH 4.079 (H) 03/17/2022    FREET4 3.03 (H) 08/03/2022    FREET4 2.59 (H) 08/02/2022    FREET4 2.71 (H) 08/01/2022     - Noted plans for tracheostomy tube today    Plan  - Strongly suspect AIT (type 1 or 2); continuing empiric treatment for both  - Continue Methimazole 20mg BID  - Continue Prednisone 40mg daily  - Continue Cholestyramine 4g QID for now  - Continue to check daily free T4 until within normal range

## 2022-08-03 NOTE — SUBJECTIVE & OBJECTIVE
"Interval HPI:   Overnight events: No acute events reported overnight  Eating:   NPO  Nausea: No  Hypoglycemia and intervention: No  Fever: No  TPN and/or TF: Yes  If yes, type of TF/TPN and rate: TF at 40 ml/hr    BP (!) 76/0 (BP Location: Right arm)   Pulse 92   Temp 99.14 °F (37.3 °C) (Core Bladder)   Resp (!) 24   Ht 6' 1" (1.854 m)   Wt 101 kg (222 lb 10.6 oz)   SpO2 98%   BMI 29.38 kg/m²     Labs Reviewed and Include    Recent Labs   Lab 08/03/22  0324   *   CALCIUM 8.5*   ALBUMIN 2.0*   PROT 6.1      K 3.6   CO2 20*      BUN 61*   CREATININE 1.3   ALKPHOS 90   ALT 32   AST 32   BILITOT 5.9*     Lab Results   Component Value Date    WBC 20.75 (H) 08/03/2022    HGB 7.2 (L) 08/03/2022    HCT 24.3 (L) 08/03/2022    MCV 95 08/03/2022     08/03/2022     Recent Labs   Lab 08/02/22  0325 08/03/22  0324   FREET4 2.59* 3.03*     Lab Results   Component Value Date    HGBA1C 5.4 04/26/2021       Nutritional status:   Body mass index is 29.38 kg/m².  Lab Results   Component Value Date    ALBUMIN 2.0 (L) 08/03/2022    ALBUMIN 2.0 (L) 08/02/2022    ALBUMIN 2.0 (L) 08/02/2022     Lab Results   Component Value Date    PREALBUMIN 13 (L) 07/29/2022    PREALBUMIN 11 (L) 07/17/2022    PREALBUMIN 10 (L) 07/15/2022       Estimated Creatinine Clearance: 80.2 mL/min (based on SCr of 1.3 mg/dL).    Accu-Checks  Recent Labs     08/01/22  1533 08/01/22  1944 08/01/22  2348 08/02/22  0324 08/02/22  0721 08/02/22  1215 08/02/22  1614 08/02/22  1956 08/03/22  0057 08/03/22  0326   POCTGLUCOSE 215* 201* 195* 191* 231* 202* 206* 189* 117* 148*       Current Medications and/or Treatments Impacting Glycemic Control  Immunotherapy:    Immunosuppressants       None          Steroids:   Hormones (From admission, onward)                Start     Stop Route Frequency Ordered    08/02/22 1931  melatonin tablet 6 mg         -- OG Nightly PRN 08/02/22 1832    07/28/22 1545  predniSONE tablet 40 mg         -- PER NG TUBE " Daily 07/28/22 1542    07/15/22 0900  vasopressin (PITRESSIN) 1 Units/mL in dextrose 5 % 100 mL infusion         -- IV Continuous 07/15/22 0900          Pressors:    Autonomic Drugs (From admission, onward)                Start     Stop Route Frequency Ordered    07/29/22 0011  EPINEPHrine (ADRENALIN) 1 mg/mL injection        Note to Pharmacy: Created by cabinet override    07/29 1214   07/29/22 0011    07/29/22 0007  EPINEPHrine 5 mg in dextrose 5% 250 mL infusion (premix)        Question Answer Comment   Begin at (in mcg/kg/min): 0.01    Titrate by: (in mcg/kg/min) 0.01    Titrate interval: (in minutes) 10    Titrate to maintain: (SBP or MAP or Cardiac Index) MAP    Greater than: (in mmHg) 65    Maximum dose: (in mcg/kg/min) 2        -- IV Continuous 07/29/22 0008    07/23/22 1400  midodrine tablet 15 mg         -- PER NG TUBE Every 8 hours 07/23/22 0944    07/15/22 0900  NORepinephrine 8 mg in dextrose 5% 250 mL infusion        Question Answer Comment   Begin at (in mcg/kg/min): 0.1    Titrate by: (in mcg/kg/min) 0.02    Titrate interval: (in minutes) 20    Titrate to maintain: (MAP or SBP) MAP    Greater than: (in mmHg) 65    Maximum dose: (in mcg/kg/min) 0.2        -- IV Continuous 07/15/22 0900          Hyperglycemia/Diabetes Medications:   Antihyperglycemics (From admission, onward)                Start     Stop Route Frequency Ordered    08/02/22 1200  insulin aspart U-100 pen 3 Units         -- SubQ Every 4 hours 08/02/22 0914    07/30/22 1023  insulin aspart U-100 pen 0-5 Units         -- SubQ Every 4 hours PRN 07/30/22 0925

## 2022-08-03 NOTE — ASSESSMENT & PLAN NOTE
Key History and Diagnostic Findings  - Hyperglycemia noted once starting tube feeds in addition to steroids  - No prior history of diabetes; most recent A1c of 5.4 on 04/26/2021    Plan  - Continue aspart 3 units q4h with low correction while on tube feeds; scheduled aspart should be stopped if tube feeds are discontinued  - Please notify endocrine if any change in tube feeds as this will change insulin requirements  - Please ensure that accuchecks are being documented q4h per order

## 2022-08-03 NOTE — PROGRESS NOTES
[facility]  Critical Care - Surgery  Progress Note    Patient Name: Tim Richards  MRN: [id number]  Admission Date: 6/27/2022  Hospital Length of Stay: 37 days  Code Status: Full Code  Attending Provider: Yg Kaufman MD  Primary Care Provider: Deyanira Booth MD   Principal Problem: Left ventricular assist device (LVAD) complication    Subjective:     Hospital/ICU Course:  No notes on file    Interval History/Significant Events: Patient went back into atrial flutter last night. BP stayed stable and lvad had no changes in speed.     Planning for trach tomorrow     Follow-up For: Procedure(s) (LRB):  REMOVAL, FOREIGN BODY (N/A)    Post-Operative Day: 12 Days Post-Op    Objective:     Vital Signs (Most Recent):  Temp: 99.32 °F (37.4 °C) (08/03/22 1000)  Pulse: 95 (08/03/22 1000)  Resp: (!) 24 (08/03/22 0839)  BP: (!) 76/0 (08/02/22 2300)  SpO2: 98 % (08/03/22 0800)   Vital Signs (24h Range):  Temp:  [97.7 °F (36.5 °C)-99.68 °F (37.6 °C)] 99.32 °F (37.4 °C)  Pulse:  [] 95  Resp:  [24] 24  SpO2:  [9 %-99 %] 98 %  BP: (76-80)/(0) 76/0  Arterial Line BP: (78-95)/(64-83) 92/81     Weight: 101 kg (222 lb 10.6 oz)  Body mass index is 29.38 kg/m².      Intake/Output Summary (Last 24 hours) at 8/3/2022 1034  Last data filed at 8/3/2022 1006  Gross per 24 hour   Intake 3121.41 ml   Output 3820 ml   Net -698.59 ml       Physical Exam  Constitutional:       Comments: Intubated and sedated   HENT:      Head: Normocephalic and atraumatic.      Nose:      Comments: NG in place  Eyes:      Pupils: Pupils are equal, round, and reactive to light.   Neck:      Comments: R IJ CVC    Cardiovascular:      Rate and Rhythm: Normal rate. Rhythm irregular.      Comments: LVAD in place -- 6400 rpm, 5.0L    Midline sternotomy c/d with dressing in place      Pulmonary:      Comments: Intubated      Abdominal:      General: There is no distension.      Palpations: Abdomen is soft.      Comments: Prior driveline  site packed with iodoform gauze   Genitourinary:     Comments: Lagunas in place draining clear urine  Musculoskeletal:      Comments: ECMO cannula sites with dressing in place.     Cutdown incision with seroma with wound vac in place, serous output. Changed yesterday    right radial arterial line   Skin:     General: Skin is warm and dry.   Neurological:      Comments: Sedated       Vents:  Vent Mode: A/C (08/03/22 0519)  Ventilator Initiated: Yes (07/29/22 0002)  Set Rate: 24 BPM (08/03/22 0519)  Vt Set: 550 mL (08/03/22 0519)  Pressure Support: 8 cmH20 (07/31/22 2043)  PEEP/CPAP: 5 cmH20 (08/03/22 0519)  Oxygen Concentration (%): 50 (08/03/22 1000)  Peak Airway Pressure: 34 cmH2O (08/03/22 0519)  Plateau Pressure: 26 cmH20 (08/03/22 0519)  Total Ve: 13.7 mL (08/03/22 0519)  Negative Inspiratory Force (cm H2O): 0 (08/03/22 0519)  F/VT Ratio<105 (RSBI): (!) 41.59 (08/03/22 0135)    Lines/Drains/Airways       Central Venous Catheter Line  Duration             Percutaneous Central Line Insertion/Assessment - Quad Lumen  07/21/22 1515 right internal jugular 12 days              Drain  Duration                  NG/OG Tube 07/15/22 1030 Left nostril 19 days         Urethral Catheter 07/27/22 1536 Temperature probe 16 Fr. 6 days              Airway  Duration                  Airway - Non-Surgical 07/29/22 0042 5 days              Arterial Line  Duration             Arterial Line 07/30/22 0900 Right Radial 4 days              Line  Duration                  VAD 07/13/22 1300 Left ventricular assist device HeartMate 3 20 days              Peripheral Intravenous Line  Duration                  Midline Catheter Insertion/Assessment  - Single Lumen 07/21/22 1616 Left basilic vein (medial side of arm) 18g x 10cm 12 days         Peripheral IV - Single Lumen 07/30/22 0648 22 G Posterior;Right Hand 4 days                    Significant Labs:    CBC/Anemia Profile:  Recent Labs   Lab 08/02/22  1219 08/02/22  1954 08/03/22  0324   WBC  19.51* 19.35* 20.75*   HGB 7.3* 7.3* 7.2*   HCT 24.3* 24.4* 24.3*    340 324   MCV 94 95 95   RDW 20.6* 20.5* 20.3*        Chemistries:  Recent Labs   Lab 08/02/22  1219 08/02/22 1954 08/03/22  0324    143 141   K 4.3 4.5 3.6    112* 110   CO2 24 21* 20*   BUN 65* 68* 61*   CREATININE 1.5* 1.5* 1.3   CALCIUM 8.8 9.1 8.5*   ALBUMIN 2.0* 2.0* 2.0*   PROT 6.2 7.0 6.1   BILITOT 6.5* 6.1* 5.9*   ALKPHOS 89 93 90   ALT 31 31 32   AST 33 37 32   MG 2.6 2.5 2.0   PHOS 2.8 2.9 2.3*       All pertinent labs within the past 24 hours have been reviewed.    Significant Imaging:  I have reviewed all pertinent imaging results/findings within the past 24 hours.    Assessment/Plan:     Chronic combined systolic and diastolic heart failure  Tim Richards is a 56 y.o. male HFrEF with an EF of 10% and a history of HM2 LVAD in 2018 who is now s/p HM3 upgrade, initiation of V-A ECMO after failure to wean off bypass x2      Neuro/Psych:   -- Sedation: precedex  -- Pain: PRN Dilaudid             Cards:   -- S/P LVAD exchange on 7/14/22  --Improving pressor requirements   Epi 0.04   Vaso 0.04  -- Continue Midodrine 15 mg Q8  -- MAP goal 75  -- VAD  flows stable  -- Decannulated on 7/19  -- Sternal closure on 7/15  -- Bedside drive line removal on 7/23  -- EP consult due to v-tach  -- restarted mexiletine. On PO amio      Pulm:   -- Goal O2 sat > 90%  -- ABG PRN  -- Nitric off 8/2  -- Extubated 7/26, re intubated 7/28  -- Monitor O2 sat with ABGs      Renal:  -- Keep sun for strict I/O  -- Trend BUN/Cr  -- Lasix gtt 2.5/hr     FEN / GI:   -- Replace lytes as needed  -- Nutrition: NPO,TPN  -- GI ppx: PPI  -- Bowel reg: miralax, docusate, mag citrate      ID:   -- Tm: afebrile night of 8/1  -- ABX cefepime, falgyl  -- Cultures sent, repeat cultures sent 7/22, no growth to date, repeat cultures sent 7/26 --> gram negative rods on sputum cultures  -- BAL GNR --> pansensitive klebsiella   -- Repeat sputum cultures  --> pansensitive klebsiella  -- daily vanc levels  -- Covid test negative      Heme/Onc:   -- H/H stable   -- Daily CBC  -- Received 18 pRBCs, 16 FFP, 2 plt, 25 cryo; 1500 albumin intra-op  -- Heparin gtt at 2000, do not titrate        Endo:   -- BG goal 140-180  -- SSI  -- Levothyroxine discontinued  -- hyperthyroid despite being hypothyroid at home  -- Endocrine consulted  -- Started on prednisone and methimazole       PPx:   Feeding: NPO, TPN  Analgesia/Sedation: Precedex/ PRN Dilaudid  Thromboembolic prevention: SCDs, heparin gtt  HOB >30: Yes  Stress Ulcer ppx: PPI  Glucose control: Critical care goal 140-180 g/dl, ISS     Lines/Drains/Airway: NG; RIJ CVC, r-radial a-line, sun; WV, VAD; midline      Dispo/Code Status/Palliative:   -- SICU / Full Code              Critical secondary to Patient has a condition that poses threat to life and bodily function: requiring pressors     Critical care was time spent personally by me on the following activities: development of treatment plan with patient or surrogate and bedside caregivers, discussions with consultants, evaluation of patient's response to treatment, examination of patient, ordering and performing treatments and interventions, ordering and review of laboratory studies, ordering and review of radiographic studies, pulse oximetry, re-evaluation of patient's condition.  This critical care time did not overlap with that of any other provider or involve time for any procedures.     Randell Jackson MD  Critical Care - Surgery  [facility]

## 2022-08-03 NOTE — PROGRESS NOTES
"[facility]  Endocrinology  Progress Note    Admit Date: 6/27/2022     56 year-old  male with NICM with LVAD, s/p ICD for VT on amiodarone and mexiletine and AIT followed by hypothyroidism initially admitted 6/27/2022 with COVID respiratory failure complicated with LVAD pump thrombosis that was refractory to medical therapy. Underwent LVAD pump exchange. Post-op course complicated by worsening cardiogenic shock/RV failure requiring VA-ECMO. Improved clinically underwent successful decannulation. Continued episodes of VT with ICD shock 7/21 and ongoing anti-arrhythmic mediation adjustments. TFTs on 7/27 significant for recurrent hyperthyroidism with undetectable TSH and elevated fT4.    - Patient re-intubated 07/29/2022    - Endocrinology consulted for recurrent AIT    Regarding AIT:    - Last seen outpatient by Dr. 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 He required a prolonged course of prednisone and methimazole, then subsequently developed hypothyroidism in May 2020. LT4 was adjusted based on serial TFT measurements. Most recently levothyroxine dose has been 112 mcg     - He self-decreased his amiodarone dose to 200 mg daily about 6 months ago. T4 was high on 7/13, but he was continued on levothyroxine 112 mcg    Lab Results   Component Value Date    TSH <0.010 (L) 07/27/2022    TSH 0.111 (L) 07/13/2022    TSH 4.079 (H) 03/17/2022    FREET4 3.03 (H) 08/03/2022    FREET4 2.59 (H) 08/02/2022    FREET4 2.71 (H) 08/01/2022       Interval HPI:   Overnight events: No acute events reported overnight  Eating:   NPO  Nausea: No  Hypoglycemia and intervention: No  Fever: No  TPN and/or TF: Yes  If yes, type of TF/TPN and rate: TF at 40 ml/hr    BP (!) 76/0 (BP Location: Right arm)   Pulse 92   Temp 99.14 °F (37.3 °C) (Core Bladder)   Resp (!) 24   Ht 6' 1" (1.854 m)   Wt 101 kg (222 lb 10.6 oz)   SpO2 98% "   BMI 29.38 kg/m²     Labs Reviewed and Include    Recent Labs   Lab 08/03/22  0324   *   CALCIUM 8.5*   ALBUMIN 2.0*   PROT 6.1      K 3.6   CO2 20*      BUN 61*   CREATININE 1.3   ALKPHOS 90   ALT 32   AST 32   BILITOT 5.9*     Lab Results   Component Value Date    WBC 20.75 (H) 08/03/2022    HGB 7.2 (L) 08/03/2022    HCT 24.3 (L) 08/03/2022    MCV 95 08/03/2022     08/03/2022     Recent Labs   Lab 08/02/22  0325 08/03/22  0324   FREET4 2.59* 3.03*     Lab Results   Component Value Date    HGBA1C 5.4 04/26/2021       Nutritional status:   Body mass index is 29.38 kg/m².  Lab Results   Component Value Date    ALBUMIN 2.0 (L) 08/03/2022    ALBUMIN 2.0 (L) 08/02/2022    ALBUMIN 2.0 (L) 08/02/2022     Lab Results   Component Value Date    PREALBUMIN 13 (L) 07/29/2022    PREALBUMIN 11 (L) 07/17/2022    PREALBUMIN 10 (L) 07/15/2022       Estimated Creatinine Clearance: 80.2 mL/min (based on SCr of 1.3 mg/dL).    Accu-Checks  Recent Labs     08/01/22  1533 08/01/22  1944 08/01/22  2348 08/02/22  0324 08/02/22  0721 08/02/22  1215 08/02/22  1614 08/02/22  1956 08/03/22  0057 08/03/22  0326   POCTGLUCOSE 215* 201* 195* 191* 231* 202* 206* 189* 117* 148*       Current Medications and/or Treatments Impacting Glycemic Control  Immunotherapy:    Immunosuppressants       None          Steroids:   Hormones (From admission, onward)                Start     Stop Route Frequency Ordered    08/02/22 1931  melatonin tablet 6 mg         -- OG Nightly PRN 08/02/22 1832    07/28/22 1545  predniSONE tablet 40 mg         -- PER NG TUBE Daily 07/28/22 1542    07/15/22 0900  vasopressin (PITRESSIN) 1 Units/mL in dextrose 5 % 100 mL infusion         -- IV Continuous 07/15/22 0900          Pressors:    Autonomic Drugs (From admission, onward)                Start     Stop Route Frequency Ordered    07/29/22 0011  EPINEPHrine (ADRENALIN) 1 mg/mL injection        Note to Pharmacy: Created by cabinet override    07/29  1214   07/29/22 0011    07/29/22 0007  EPINEPHrine 5 mg in dextrose 5% 250 mL infusion (premix)        Question Answer Comment   Begin at (in mcg/kg/min): 0.01    Titrate by: (in mcg/kg/min) 0.01    Titrate interval: (in minutes) 10    Titrate to maintain: (SBP or MAP or Cardiac Index) MAP    Greater than: (in mmHg) 65    Maximum dose: (in mcg/kg/min) 2        -- IV Continuous 07/29/22 0008    07/23/22 1400  midodrine tablet 15 mg         -- PER NG TUBE Every 8 hours 07/23/22 0944    07/15/22 0900  NORepinephrine 8 mg in dextrose 5% 250 mL infusion        Question Answer Comment   Begin at (in mcg/kg/min): 0.1    Titrate by: (in mcg/kg/min) 0.02    Titrate interval: (in minutes) 20    Titrate to maintain: (MAP or SBP) MAP    Greater than: (in mmHg) 65    Maximum dose: (in mcg/kg/min) 0.2        -- IV Continuous 07/15/22 0900          Hyperglycemia/Diabetes Medications:   Antihyperglycemics (From admission, onward)                Start     Stop Route Frequency Ordered    08/02/22 1200  insulin aspart U-100 pen 3 Units         -- SubQ Every 4 hours 08/02/22 0914    07/30/22 1023  insulin aspart U-100 pen 0-5 Units         -- SubQ Every 4 hours PRN 07/30/22 0925            ASSESSMENT and PLAN    Amiodarone-induced hyperthyroidism  Key History and Diagnostic Findings  - History of AIT type 2 (TRAb and TSI negative; Thyroid US unremarkable with low vascularity)  - Weaned off methimazole and prednisone by May 2020, then developed hypothyroidism, LT4 started and dose titrated per TFTs. Prior to current admission he was on LT4 112 mcg daily  - Labs consistent with hypothyroidism until current admission, initially on 7/13, with low TSH and elevated fT4. Repeat TFTs on 7/27 with worsening hyperthyroidism. He continued to receive LT4 112 mcg through 7/28. He remains on amiodarone for episodes of VT  - Suspect current hyperthyroidism is AIT, however continued exogenous LT4 certainly contribute to current presentation   - Free  T4 increased to 3.0 from 2.6. Liver enzymes remain normal  Lab Results   Component Value Date    TSH <0.010 (L) 07/27/2022    TSH 0.111 (L) 07/13/2022    TSH 4.079 (H) 03/17/2022    FREET4 3.03 (H) 08/03/2022    FREET4 2.59 (H) 08/02/2022    FREET4 2.71 (H) 08/01/2022     - Noted plans for tracheostomy tube today    Plan  - Strongly suspect AIT (type 1 or 2); continuing empiric treatment for both  - Continue Methimazole 20mg BID  - Increase Prednisone from 40 up to 60 mg daily given increase in free T4  - Continue Cholestyramine 4g QID for now  - Continue to check daily free T4 until within normal range    amiodarone induced hypothyrodism  - Levothyroxine discontinued on 7/28/2022  - Please see plan as above    Hyperglycemia  Key History and Diagnostic Findings  - Hyperglycemia noted once starting tube feeds in addition to steroids  - No prior history of diabetes; most recent A1c of 5.4 on 04/26/2021    Plan  - Continue aspart 3 units q4h with low correction while on tube feeds; scheduled aspart should be stopped if tube feeds are discontinued  - Please notify endocrine if any change in tube feeds as this will change insulin requirements  - Please ensure that accuchecks are being documented q4h per order      Akira Zuñiga DO  [facility], [address]  [address], [place], [address]    Office: [phone]  Fax: [phone]    Disclaimer: This note has been generated using voice-recognition software. There may be typographical errors that have been missed during proof-reading.    The above history labs imaging impression and plan were discussed with attending physician who is in agreement and also took part in this patient's care.  I personally reviewed all of the patients available medications, labs, imaging, vitals, allergies, medical history.

## 2022-08-03 NOTE — ASSESSMENT & PLAN NOTE
Tim Richards is a 56 y.o. male HFrEF with an EF of 10% and a history of HM2 LVAD in 2018 who is now s/p HM3 upgrade, initiation of V-A ECMO after failure to wean off bypass x2      Neuro/Psych:   -- Sedation: precedex  -- Pain: PRN Dilaudid             Cards:   -- S/P LVAD exchange on 7/14/22  --Improving pressor requirements   Epi 0.04   Vaso 0.04  -- Continue Midodrine 15 mg Q8  -- MAP goal 75  -- VAD  flows stable  -- Decannulated on 7/19  -- Sternal closure on 7/15  -- Bedside drive line removal on 7/23  -- EP consult due to v-tach  -- restarted mexiletine. On PO amio      Pulm:   -- Goal O2 sat > 90%  -- ABG PRN  -- Nitric off 8/2  -- Extubated 7/26, re intubated 7/28  -- Monitor O2 sat with ABGs      Renal:  -- Keep sun for strict I/O  -- Trend BUN/Cr  -- Lasix gtt 2.5/hr     FEN / GI:   -- Replace lytes as needed  -- Nutrition: NPO,TPN  -- GI ppx: PPI  -- Bowel reg: miralax, docusate, mag citrate      ID:   -- Tm: afebrile night of 8/1  -- ABX cefepime, falgyl  -- Cultures sent, repeat cultures sent 7/22, no growth to date, repeat cultures sent 7/26 --> gram negative rods on sputum cultures  -- BAL GNR --> pansensitive klebsiella   -- Repeat sputum cultures --> pansensitive klebsiella  -- daily vanc levels  -- Covid test negative      Heme/Onc:   -- H/H stable   -- Daily CBC  -- Received 18 pRBCs, 16 FFP, 2 plt, 25 cryo; 1500 albumin intra-op  -- Heparin gtt at 2000, do not titrate        Endo:   -- BG goal 140-180  -- SSI  -- Levothyroxine discontinued  -- hyperthyroid despite being hypothyroid at home  -- Endocrine consulted  -- Started on prednisone and methimazole       PPx:   Feeding: NPO, TPN  Analgesia/Sedation: Precedex/ PRN Dilaudid  Thromboembolic prevention: SCDs, heparin gtt  HOB >30: Yes  Stress Ulcer ppx: PPI  Glucose control: Critical care goal 140-180 g/dl, ISS     Lines/Drains/Airway: NG; RIJ CVC, r-radial a-line, sun; WV, VAD; midline      Dispo/Code Status/Palliative:    -- SICU / Full Code

## 2022-08-03 NOTE — PLAN OF CARE
Pt alert and following commands. ACVC 50%, PEEP 5. Atrial flutter noted. VAD hum audible. -200/hr. On lasix, heparin, vaso, epi, and precedex gtt. TPN at 40/hr. Vaso at 0.02. Wound care and dressings changed per orders. Plan is to take pt to surgery tomorrow to place tracheostomy and replace central line. Consents signed and placed in chart. Plan of care reviewed with patient and spouse. All questions and concerns addressed.

## 2022-08-03 NOTE — SUBJECTIVE & OBJECTIVE
Interval History/Significant Events: Patient went back into atrial flutter last night. BP stayed stable and lvad had no changes in speed.     Planning for trach tomorrow     Follow-up For: Procedure(s) (LRB):  REMOVAL, FOREIGN BODY (N/A)    Post-Operative Day: 12 Days Post-Op    Objective:     Vital Signs (Most Recent):  Temp: 99.32 °F (37.4 °C) (08/03/22 1000)  Pulse: 95 (08/03/22 1000)  Resp: (!) 24 (08/03/22 0839)  BP: (!) 76/0 (08/02/22 2300)  SpO2: 98 % (08/03/22 0800)   Vital Signs (24h Range):  Temp:  [97.7 °F (36.5 °C)-99.68 °F (37.6 °C)] 99.32 °F (37.4 °C)  Pulse:  [] 95  Resp:  [24] 24  SpO2:  [9 %-99 %] 98 %  BP: (76-80)/(0) 76/0  Arterial Line BP: (78-95)/(64-83) 92/81     Weight: 101 kg (222 lb 10.6 oz)  Body mass index is 29.38 kg/m².      Intake/Output Summary (Last 24 hours) at 8/3/2022 1034  Last data filed at 8/3/2022 1006  Gross per 24 hour   Intake 3121.41 ml   Output 3820 ml   Net -698.59 ml       Physical Exam  Constitutional:       Comments: Intubated and sedated   HENT:      Head: Normocephalic and atraumatic.      Nose:      Comments: NG in place  Eyes:      Pupils: Pupils are equal, round, and reactive to light.   Neck:      Comments: R IJ CVC    Cardiovascular:      Rate and Rhythm: Normal rate. Rhythm irregular.      Comments: LVAD in place -- 6400 rpm, 5.0L    Midline sternotomy c/d with dressing in place      Pulmonary:      Comments: Intubated      Abdominal:      General: There is no distension.      Palpations: Abdomen is soft.      Comments: Prior driveline site packed with iodoform gauze   Genitourinary:     Comments: Lagunas in place draining clear urine  Musculoskeletal:      Comments: ECMO cannula sites with dressing in place.     Cutdown incision with seroma with wound vac in place, serous output. Changed yesterday    right radial arterial line   Skin:     General: Skin is warm and dry.   Neurological:      Comments: Sedated       Vents:  Vent Mode: A/C (08/03/22  0519)  Ventilator Initiated: Yes (07/29/22 0002)  Set Rate: 24 BPM (08/03/22 0519)  Vt Set: 550 mL (08/03/22 0519)  Pressure Support: 8 cmH20 (07/31/22 2043)  PEEP/CPAP: 5 cmH20 (08/03/22 0519)  Oxygen Concentration (%): 50 (08/03/22 1000)  Peak Airway Pressure: 34 cmH2O (08/03/22 0519)  Plateau Pressure: 26 cmH20 (08/03/22 0519)  Total Ve: 13.7 mL (08/03/22 0519)  Negative Inspiratory Force (cm H2O): 0 (08/03/22 0519)  F/VT Ratio<105 (RSBI): (!) 41.59 (08/03/22 0135)    Lines/Drains/Airways       Central Venous Catheter Line  Duration             Percutaneous Central Line Insertion/Assessment - Quad Lumen  07/21/22 1515 right internal jugular 12 days              Drain  Duration                  NG/OG Tube 07/15/22 1030 Left nostril 19 days         Urethral Catheter 07/27/22 1536 Temperature probe 16 Fr. 6 days              Airway  Duration                  Airway - Non-Surgical 07/29/22 0042 5 days              Arterial Line  Duration             Arterial Line 07/30/22 0900 Right Radial 4 days              Line  Duration                  VAD 07/13/22 1300 Left ventricular assist device HeartMate 3 20 days              Peripheral Intravenous Line  Duration                  Midline Catheter Insertion/Assessment  - Single Lumen 07/21/22 1616 Left basilic vein (medial side of arm) 18g x 10cm 12 days         Peripheral IV - Single Lumen 07/30/22 0648 22 G Posterior;Right Hand 4 days                    Significant Labs:    CBC/Anemia Profile:  Recent Labs   Lab 08/02/22 1219 08/02/22 1954 08/03/22  0324   WBC 19.51* 19.35* 20.75*   HGB 7.3* 7.3* 7.2*   HCT 24.3* 24.4* 24.3*    340 324   MCV 94 95 95   RDW 20.6* 20.5* 20.3*        Chemistries:  Recent Labs   Lab 08/02/22  1219 08/02/22 1954 08/03/22  0324    143 141   K 4.3 4.5 3.6    112* 110   CO2 24 21* 20*   BUN 65* 68* 61*   CREATININE 1.5* 1.5* 1.3   CALCIUM 8.8 9.1 8.5*   ALBUMIN 2.0* 2.0* 2.0*   PROT 6.2 7.0 6.1   BILITOT 6.5* 6.1* 5.9*    ALKPHOS 89 93 90   ALT 31 31 32   AST 33 37 32   MG 2.6 2.5 2.0   PHOS 2.8 2.9 2.3*       All pertinent labs within the past 24 hours have been reviewed.    Significant Imaging:  I have reviewed all pertinent imaging results/findings within the past 24 hours.

## 2022-08-03 NOTE — NURSING
notified of anti-Xa being subtherapeutic; verbal orders to keep gtt at  2200 units/hr. Will continue q6h monitoring.

## 2022-08-04 ENCOUNTER — DOCUMENTATION ONLY (OUTPATIENT)
Dept: ELECTROPHYSIOLOGY | Facility: CLINIC | Age: 57
End: 2022-08-04

## 2022-08-04 ENCOUNTER — ANESTHESIA (OUTPATIENT)
Dept: SURGERY | Facility: HOSPITAL | Age: 57
DRG: 001 | End: 2022-08-04
Payer: COMMERCIAL

## 2022-08-04 LAB
ABO + RH BLD: NORMAL
ALBUMIN SERPL BCP-MCNC: 2.1 G/DL (ref 3.5–5.2)
ALBUMIN SERPL BCP-MCNC: 2.1 G/DL (ref 3.5–5.2)
ALBUMIN SERPL BCP-MCNC: 2.4 G/DL (ref 3.5–5.2)
ALLENS TEST: ABNORMAL
ALP SERPL-CCNC: 100 U/L (ref 55–135)
ALP SERPL-CCNC: 97 U/L (ref 55–135)
ALP SERPL-CCNC: 99 U/L (ref 55–135)
ALT SERPL W/O P-5'-P-CCNC: 33 U/L (ref 10–44)
ALT SERPL W/O P-5'-P-CCNC: 39 U/L (ref 10–44)
ALT SERPL W/O P-5'-P-CCNC: 42 U/L (ref 10–44)
ANION GAP SERPL CALC-SCNC: 10 MMOL/L (ref 8–16)
ANION GAP SERPL CALC-SCNC: 11 MMOL/L (ref 8–16)
ANION GAP SERPL CALC-SCNC: 8 MMOL/L (ref 8–16)
APTT BLDCRRT: 46.5 SEC (ref 21–32)
AST SERPL-CCNC: 31 U/L (ref 10–40)
AST SERPL-CCNC: 33 U/L (ref 10–40)
AST SERPL-CCNC: 40 U/L (ref 10–40)
BASOPHILS # BLD AUTO: 0.01 K/UL (ref 0–0.2)
BASOPHILS # BLD AUTO: 0.04 K/UL (ref 0–0.2)
BASOPHILS # BLD AUTO: 0.05 K/UL (ref 0–0.2)
BASOPHILS NFR BLD: 0 % (ref 0–1.9)
BASOPHILS NFR BLD: 0.2 % (ref 0–1.9)
BASOPHILS NFR BLD: 0.2 % (ref 0–1.9)
BILIRUB SERPL-MCNC: 5.2 MG/DL (ref 0.1–1)
BILIRUB SERPL-MCNC: 5.3 MG/DL (ref 0.1–1)
BILIRUB SERPL-MCNC: 5.4 MG/DL (ref 0.1–1)
BLD GP AB SCN CELLS X3 SERPL QL: NORMAL
BUN SERPL-MCNC: 57 MG/DL (ref 6–20)
BUN SERPL-MCNC: 61 MG/DL (ref 6–20)
BUN SERPL-MCNC: 61 MG/DL (ref 6–20)
CALCIUM SERPL-MCNC: 9.3 MG/DL (ref 8.7–10.5)
CALCIUM SERPL-MCNC: 9.4 MG/DL (ref 8.7–10.5)
CALCIUM SERPL-MCNC: 9.7 MG/DL (ref 8.7–10.5)
CHLORIDE SERPL-SCNC: 110 MMOL/L (ref 95–110)
CO2 SERPL-SCNC: 20 MMOL/L (ref 23–29)
CO2 SERPL-SCNC: 21 MMOL/L (ref 23–29)
CO2 SERPL-SCNC: 21 MMOL/L (ref 23–29)
CREAT SERPL-MCNC: 1.3 MG/DL (ref 0.5–1.4)
CREAT SERPL-MCNC: 1.6 MG/DL (ref 0.5–1.4)
CREAT SERPL-MCNC: 1.6 MG/DL (ref 0.5–1.4)
DELSYS: ABNORMAL
DELSYS: ABNORMAL
DIFFERENTIAL METHOD: ABNORMAL
EOSINOPHIL # BLD AUTO: 0 K/UL (ref 0–0.5)
EOSINOPHIL NFR BLD: 0 % (ref 0–8)
ERYTHROCYTE [DISTWIDTH] IN BLOOD BY AUTOMATED COUNT: 19.6 % (ref 11.5–14.5)
ERYTHROCYTE [DISTWIDTH] IN BLOOD BY AUTOMATED COUNT: 19.8 % (ref 11.5–14.5)
ERYTHROCYTE [DISTWIDTH] IN BLOOD BY AUTOMATED COUNT: 20 % (ref 11.5–14.5)
ERYTHROCYTE [SEDIMENTATION RATE] IN BLOOD BY WESTERGREN METHOD: 24 MM/H
EST. GFR  (NO RACE VARIABLE): 50.6 ML/MIN/1.73 M^2
EST. GFR  (NO RACE VARIABLE): 50.6 ML/MIN/1.73 M^2
EST. GFR  (NO RACE VARIABLE): >60 ML/MIN/1.73 M^2
FACT X PPP CHRO-ACNC: 0.37 IU/ML (ref 0.3–0.7)
FACT X PPP CHRO-ACNC: <0.1 IU/ML (ref 0.3–0.7)
FIBRINOGEN PPP-MCNC: 691 MG/DL (ref 182–400)
FIO2: 50
FIO2: 50
GLUCOSE SERPL-MCNC: 137 MG/DL (ref 70–110)
GLUCOSE SERPL-MCNC: 184 MG/DL (ref 70–110)
GLUCOSE SERPL-MCNC: 221 MG/DL (ref 70–110)
HCO3 UR-SCNC: 23.3 MMOL/L (ref 24–28)
HCO3 UR-SCNC: 24.2 MMOL/L (ref 24–28)
HCT VFR BLD AUTO: 25.6 % (ref 40–54)
HCT VFR BLD AUTO: 25.7 % (ref 40–54)
HCT VFR BLD AUTO: 26.4 % (ref 40–54)
HGB BLD-MCNC: 7.6 G/DL (ref 14–18)
HGB BLD-MCNC: 7.6 G/DL (ref 14–18)
HGB BLD-MCNC: 7.8 G/DL (ref 14–18)
IMM GRANULOCYTES # BLD AUTO: 0.71 K/UL (ref 0–0.04)
IMM GRANULOCYTES # BLD AUTO: 0.79 K/UL (ref 0–0.04)
IMM GRANULOCYTES # BLD AUTO: 0.82 K/UL (ref 0–0.04)
IMM GRANULOCYTES NFR BLD AUTO: 3.1 % (ref 0–0.5)
IMM GRANULOCYTES NFR BLD AUTO: 3.3 % (ref 0–0.5)
IMM GRANULOCYTES NFR BLD AUTO: 3.3 % (ref 0–0.5)
INR PPP: 1.1 (ref 0.8–1.2)
INR PPP: 1.2 (ref 0.8–1.2)
INR PPP: 1.2 (ref 0.8–1.2)
LDH SERPL L TO P-CCNC: 275 U/L (ref 110–260)
LYMPHOCYTES # BLD AUTO: 0.3 K/UL (ref 1–4.8)
LYMPHOCYTES # BLD AUTO: 0.4 K/UL (ref 1–4.8)
LYMPHOCYTES # BLD AUTO: 0.5 K/UL (ref 1–4.8)
LYMPHOCYTES NFR BLD: 1.1 % (ref 18–48)
LYMPHOCYTES NFR BLD: 1.6 % (ref 18–48)
LYMPHOCYTES NFR BLD: 2.1 % (ref 18–48)
MAGNESIUM SERPL-MCNC: 2.6 MG/DL (ref 1.6–2.6)
MAGNESIUM SERPL-MCNC: 2.7 MG/DL (ref 1.6–2.6)
MAGNESIUM SERPL-MCNC: 2.8 MG/DL (ref 1.6–2.6)
MCH RBC QN AUTO: 27.3 PG (ref 27–31)
MCH RBC QN AUTO: 27.6 PG (ref 27–31)
MCH RBC QN AUTO: 28 PG (ref 27–31)
MCHC RBC AUTO-ENTMCNC: 29.5 G/DL (ref 32–36)
MCHC RBC AUTO-ENTMCNC: 29.6 G/DL (ref 32–36)
MCHC RBC AUTO-ENTMCNC: 29.7 G/DL (ref 32–36)
MCV RBC AUTO: 92 FL (ref 82–98)
MCV RBC AUTO: 93 FL (ref 82–98)
MCV RBC AUTO: 95 FL (ref 82–98)
MODE: ABNORMAL
MODE: ABNORMAL
MONOCYTES # BLD AUTO: 0.8 K/UL (ref 0.3–1)
MONOCYTES # BLD AUTO: 0.8 K/UL (ref 0.3–1)
MONOCYTES # BLD AUTO: 1.2 K/UL (ref 0.3–1)
MONOCYTES NFR BLD: 3.1 % (ref 4–15)
MONOCYTES NFR BLD: 3.1 % (ref 4–15)
MONOCYTES NFR BLD: 5.4 % (ref 4–15)
NEUTROPHILS # BLD AUTO: 19.4 K/UL (ref 1.8–7.7)
NEUTROPHILS # BLD AUTO: 22.3 K/UL (ref 1.8–7.7)
NEUTROPHILS # BLD AUTO: 24.2 K/UL (ref 1.8–7.7)
NEUTROPHILS NFR BLD: 89.2 % (ref 38–73)
NEUTROPHILS NFR BLD: 91.8 % (ref 38–73)
NEUTROPHILS NFR BLD: 92.5 % (ref 38–73)
NRBC BLD-RTO: 0 /100 WBC
PCO2 BLDA: 36.5 MMHG (ref 35–45)
PCO2 BLDA: 37.3 MMHG (ref 35–45)
PEEP: 5
PH SMN: 7.4 [PH] (ref 7.35–7.45)
PH SMN: 7.43 [PH] (ref 7.35–7.45)
PHOSPHATE SERPL-MCNC: 2.8 MG/DL (ref 2.7–4.5)
PHOSPHATE SERPL-MCNC: 3.5 MG/DL (ref 2.7–4.5)
PHOSPHATE SERPL-MCNC: 4.2 MG/DL (ref 2.7–4.5)
PLATELET # BLD AUTO: 333 K/UL (ref 150–450)
PLATELET # BLD AUTO: 354 K/UL (ref 150–450)
PLATELET # BLD AUTO: 368 K/UL (ref 150–450)
PMV BLD AUTO: 11.4 FL (ref 9.2–12.9)
PMV BLD AUTO: 11.8 FL (ref 9.2–12.9)
PMV BLD AUTO: 11.8 FL (ref 9.2–12.9)
PO2 BLDA: 145 MMHG (ref 80–100)
PO2 BLDA: 157 MMHG (ref 80–100)
PO2 BLDA: 36 MMHG (ref 40–60)
POC BE: -2 MMOL/L
POC BE: 0 MMOL/L
POC SATURATED O2: 65 % (ref 95–100)
POC SATURATED O2: 99 % (ref 95–100)
POC SATURATED O2: 99 % (ref 95–100)
POC TCO2: 24 MMOL/L (ref 23–27)
POC TCO2: 25 MMOL/L (ref 23–27)
POCT GLUCOSE: 108 MG/DL (ref 70–110)
POCT GLUCOSE: 145 MG/DL (ref 70–110)
POCT GLUCOSE: 174 MG/DL (ref 70–110)
POCT GLUCOSE: 175 MG/DL (ref 70–110)
POCT GLUCOSE: 193 MG/DL (ref 70–110)
POCT GLUCOSE: 249 MG/DL (ref 70–110)
POTASSIUM SERPL-SCNC: 4.2 MMOL/L (ref 3.5–5.1)
POTASSIUM SERPL-SCNC: 5.1 MMOL/L (ref 3.5–5.1)
POTASSIUM SERPL-SCNC: 5.1 MMOL/L (ref 3.5–5.1)
PROT SERPL-MCNC: 6.4 G/DL (ref 6–8.4)
PROT SERPL-MCNC: 6.9 G/DL (ref 6–8.4)
PROT SERPL-MCNC: 7 G/DL (ref 6–8.4)
PROTHROMBIN TIME: 11.8 SEC (ref 9–12.5)
PROTHROMBIN TIME: 12.2 SEC (ref 9–12.5)
PROTHROMBIN TIME: 12.3 SEC (ref 9–12.5)
RBC # BLD AUTO: 2.71 M/UL (ref 4.6–6.2)
RBC # BLD AUTO: 2.75 M/UL (ref 4.6–6.2)
RBC # BLD AUTO: 2.86 M/UL (ref 4.6–6.2)
SAMPLE: ABNORMAL
SITE: ABNORMAL
SODIUM SERPL-SCNC: 139 MMOL/L (ref 136–145)
SODIUM SERPL-SCNC: 141 MMOL/L (ref 136–145)
SODIUM SERPL-SCNC: 141 MMOL/L (ref 136–145)
T3 SERPL-MCNC: <40 NG/DL (ref 60–180)
T4 FREE SERPL-MCNC: 3.46 NG/DL (ref 0.71–1.51)
VT: 500
WBC # BLD AUTO: 21.69 K/UL (ref 3.9–12.7)
WBC # BLD AUTO: 24.23 K/UL (ref 3.9–12.7)
WBC # BLD AUTO: 26.11 K/UL (ref 3.9–12.7)

## 2022-08-04 PROCEDURE — 87070 CULTURE OTHR SPECIMN AEROBIC: CPT | Performed by: INTERNAL MEDICINE

## 2022-08-04 PROCEDURE — 85610 PROTHROMBIN TIME: CPT | Mod: 91 | Performed by: THORACIC SURGERY (CARDIOTHORACIC VASCULAR SURGERY)

## 2022-08-04 PROCEDURE — 25000242 PHARM REV CODE 250 ALT 637 W/ HCPCS

## 2022-08-04 PROCEDURE — 99232 PR SUBSEQUENT HOSPITAL CARE,LEVL II: ICD-10-PCS | Mod: ,,, | Performed by: INTERNAL MEDICINE

## 2022-08-04 PROCEDURE — 93750 PR INTERROGATE VENT ASSIST DEV, IN PERSON, W PHYSICIAN ANALYSIS: ICD-10-PCS | Mod: ,,, | Performed by: INTERNAL MEDICINE

## 2022-08-04 PROCEDURE — 37799 UNLISTED PX VASCULAR SURGERY: CPT

## 2022-08-04 PROCEDURE — 25000003 PHARM REV CODE 250

## 2022-08-04 PROCEDURE — 97110 THERAPEUTIC EXERCISES: CPT

## 2022-08-04 PROCEDURE — 63600175 PHARM REV CODE 636 W HCPCS: Performed by: NURSE ANESTHETIST, CERTIFIED REGISTERED

## 2022-08-04 PROCEDURE — 86850 RBC ANTIBODY SCREEN: CPT | Performed by: THORACIC SURGERY (CARDIOTHORACIC VASCULAR SURGERY)

## 2022-08-04 PROCEDURE — 84480 ASSAY TRIIODOTHYRONINE (T3): CPT | Performed by: STUDENT IN AN ORGANIZED HEALTH CARE EDUCATION/TRAINING PROGRAM

## 2022-08-04 PROCEDURE — 63600175 PHARM REV CODE 636 W HCPCS: Performed by: STUDENT IN AN ORGANIZED HEALTH CARE EDUCATION/TRAINING PROGRAM

## 2022-08-04 PROCEDURE — D9220A PRA ANESTHESIA: Mod: CRNA,,, | Performed by: NURSE ANESTHETIST, CERTIFIED REGISTERED

## 2022-08-04 PROCEDURE — 94640 AIRWAY INHALATION TREATMENT: CPT

## 2022-08-04 PROCEDURE — 99231 SBSQ HOSP IP/OBS SF/LOW 25: CPT | Mod: 57,,, | Performed by: SURGERY

## 2022-08-04 PROCEDURE — 85025 COMPLETE CBC W/AUTO DIFF WBC: CPT | Performed by: THORACIC SURGERY (CARDIOTHORACIC VASCULAR SURGERY)

## 2022-08-04 PROCEDURE — 99900026 HC AIRWAY MAINTENANCE (STAT)

## 2022-08-04 PROCEDURE — 82803 BLOOD GASES ANY COMBINATION: CPT

## 2022-08-04 PROCEDURE — 99900035 HC TECH TIME PER 15 MIN (STAT)

## 2022-08-04 PROCEDURE — 82800 BLOOD PH: CPT

## 2022-08-04 PROCEDURE — D9220A PRA ANESTHESIA: ICD-10-PCS | Mod: CRNA,,, | Performed by: NURSE ANESTHETIST, CERTIFIED REGISTERED

## 2022-08-04 PROCEDURE — 25000003 PHARM REV CODE 250: Performed by: STUDENT IN AN ORGANIZED HEALTH CARE EDUCATION/TRAINING PROGRAM

## 2022-08-04 PROCEDURE — 25000003 PHARM REV CODE 250: Performed by: PHYSICIAN ASSISTANT

## 2022-08-04 PROCEDURE — 25000003 PHARM REV CODE 250: Performed by: ANESTHESIOLOGY

## 2022-08-04 PROCEDURE — 36000706: Performed by: SURGERY

## 2022-08-04 PROCEDURE — 99231 PR SUBSEQUENT HOSPITAL CARE,LEVL I: ICD-10-PCS | Mod: 57,,, | Performed by: SURGERY

## 2022-08-04 PROCEDURE — D9220A PRA ANESTHESIA: Mod: ANES,,, | Performed by: ANESTHESIOLOGY

## 2022-08-04 PROCEDURE — 94645 CONT INHLJ TX EACH ADDL HOUR: CPT

## 2022-08-04 PROCEDURE — 25000242 PHARM REV CODE 250 ALT 637 W/ HCPCS: Performed by: THORACIC SURGERY (CARDIOTHORACIC VASCULAR SURGERY)

## 2022-08-04 PROCEDURE — 84100 ASSAY OF PHOSPHORUS: CPT | Mod: 91 | Performed by: THORACIC SURGERY (CARDIOTHORACIC VASCULAR SURGERY)

## 2022-08-04 PROCEDURE — 63600175 PHARM REV CODE 636 W HCPCS

## 2022-08-04 PROCEDURE — 94003 VENT MGMT INPAT SUBQ DAY: CPT

## 2022-08-04 PROCEDURE — 36000707: Performed by: SURGERY

## 2022-08-04 PROCEDURE — 99291 PR CRITICAL CARE, E/M 30-74 MINUTES: ICD-10-PCS | Mod: 25,,, | Performed by: INTERNAL MEDICINE

## 2022-08-04 PROCEDURE — 85730 THROMBOPLASTIN TIME PARTIAL: CPT | Performed by: THORACIC SURGERY (CARDIOTHORACIC VASCULAR SURGERY)

## 2022-08-04 PROCEDURE — 82565 ASSAY OF CREATININE: CPT

## 2022-08-04 PROCEDURE — 99232 SBSQ HOSP IP/OBS MODERATE 35: CPT | Mod: ,,, | Performed by: INTERNAL MEDICINE

## 2022-08-04 PROCEDURE — 84439 ASSAY OF FREE THYROXINE: CPT | Performed by: STUDENT IN AN ORGANIZED HEALTH CARE EDUCATION/TRAINING PROGRAM

## 2022-08-04 PROCEDURE — C9113 INJ PANTOPRAZOLE SODIUM, VIA: HCPCS

## 2022-08-04 PROCEDURE — 85520 HEPARIN ASSAY: CPT | Performed by: THORACIC SURGERY (CARDIOTHORACIC VASCULAR SURGERY)

## 2022-08-04 PROCEDURE — 36556 PR INSERT NON-TUNNEL CV CATH 5+ YRS OLD: ICD-10-PCS | Mod: 59,,, | Performed by: ANESTHESIOLOGY

## 2022-08-04 PROCEDURE — 94644 CONT INHLJ TX 1ST HOUR: CPT

## 2022-08-04 PROCEDURE — 27100171 HC OXYGEN HIGH FLOW UP TO 24 HOURS

## 2022-08-04 PROCEDURE — 31600 PR TRACHEOSTOMY, PLANNED: ICD-10-PCS | Mod: ,,, | Performed by: SURGERY

## 2022-08-04 PROCEDURE — 20000000 HC ICU ROOM

## 2022-08-04 PROCEDURE — 27000221 HC OXYGEN, UP TO 24 HOURS

## 2022-08-04 PROCEDURE — 37000009 HC ANESTHESIA EA ADD 15 MINS: Performed by: SURGERY

## 2022-08-04 PROCEDURE — 94761 N-INVAS EAR/PLS OXIMETRY MLT: CPT

## 2022-08-04 PROCEDURE — 37000008 HC ANESTHESIA 1ST 15 MINUTES: Performed by: SURGERY

## 2022-08-04 PROCEDURE — 84295 ASSAY OF SERUM SODIUM: CPT

## 2022-08-04 PROCEDURE — 83735 ASSAY OF MAGNESIUM: CPT | Mod: 91 | Performed by: THORACIC SURGERY (CARDIOTHORACIC VASCULAR SURGERY)

## 2022-08-04 PROCEDURE — 25000003 PHARM REV CODE 250: Performed by: NURSE ANESTHETIST, CERTIFIED REGISTERED

## 2022-08-04 PROCEDURE — 83615 LACTATE (LD) (LDH) ENZYME: CPT | Performed by: STUDENT IN AN ORGANIZED HEALTH CARE EDUCATION/TRAINING PROGRAM

## 2022-08-04 PROCEDURE — 93750 INTERROGATION VAD IN PERSON: CPT | Mod: ,,, | Performed by: INTERNAL MEDICINE

## 2022-08-04 PROCEDURE — 82330 ASSAY OF CALCIUM: CPT

## 2022-08-04 PROCEDURE — 84132 ASSAY OF SERUM POTASSIUM: CPT

## 2022-08-04 PROCEDURE — 99291 CRITICAL CARE FIRST HOUR: CPT | Mod: 25,,, | Performed by: INTERNAL MEDICINE

## 2022-08-04 PROCEDURE — 97530 THERAPEUTIC ACTIVITIES: CPT

## 2022-08-04 PROCEDURE — 36556 INSERT NON-TUNNEL CV CATH: CPT | Mod: 59,,, | Performed by: ANESTHESIOLOGY

## 2022-08-04 PROCEDURE — 80053 COMPREHEN METABOLIC PANEL: CPT | Mod: 91 | Performed by: THORACIC SURGERY (CARDIOTHORACIC VASCULAR SURGERY)

## 2022-08-04 PROCEDURE — 27000248 HC VAD-ADDITIONAL DAY

## 2022-08-04 PROCEDURE — 85014 HEMATOCRIT: CPT

## 2022-08-04 PROCEDURE — 25000003 PHARM REV CODE 250: Performed by: THORACIC SURGERY (CARDIOTHORACIC VASCULAR SURGERY)

## 2022-08-04 PROCEDURE — D9220A PRA ANESTHESIA: ICD-10-PCS | Mod: ANES,,, | Performed by: ANESTHESIOLOGY

## 2022-08-04 PROCEDURE — 85384 FIBRINOGEN ACTIVITY: CPT | Performed by: THORACIC SURGERY (CARDIOTHORACIC VASCULAR SURGERY)

## 2022-08-04 PROCEDURE — 27000644 HC VENT IN-LINE ADAPTER

## 2022-08-04 PROCEDURE — 25000003 PHARM REV CODE 250: Performed by: INTERNAL MEDICINE

## 2022-08-04 PROCEDURE — 31600 PLANNED TRACHEOSTOMY: CPT | Mod: ,,, | Performed by: SURGERY

## 2022-08-04 RX ORDER — FENTANYL CITRATE 50 UG/ML
INJECTION, SOLUTION INTRAMUSCULAR; INTRAVENOUS
Status: DISCONTINUED | OUTPATIENT
Start: 2022-08-04 | End: 2022-08-04

## 2022-08-04 RX ORDER — METHIMAZOLE 10 MG/1
30 TABLET ORAL 2 TIMES DAILY
Status: DISCONTINUED | OUTPATIENT
Start: 2022-08-04 | End: 2022-08-04

## 2022-08-04 RX ORDER — EPINEPHRINE 1 MG/ML
INJECTION, SOLUTION INTRACARDIAC; INTRAMUSCULAR; INTRAVENOUS; SUBCUTANEOUS
Status: DISCONTINUED | OUTPATIENT
Start: 2022-08-04 | End: 2022-08-04

## 2022-08-04 RX ORDER — FUROSEMIDE 10 MG/ML
40 INJECTION INTRAMUSCULAR; INTRAVENOUS ONCE
Status: COMPLETED | OUTPATIENT
Start: 2022-08-04 | End: 2022-08-04

## 2022-08-04 RX ORDER — MIDAZOLAM HYDROCHLORIDE 1 MG/ML
INJECTION, SOLUTION INTRAMUSCULAR; INTRAVENOUS
Status: DISCONTINUED | OUTPATIENT
Start: 2022-08-04 | End: 2022-08-04

## 2022-08-04 RX ORDER — DEXAMETHASONE SODIUM PHOSPHATE 4 MG/ML
INJECTION, SOLUTION INTRA-ARTICULAR; INTRALESIONAL; INTRAMUSCULAR; INTRAVENOUS; SOFT TISSUE
Status: DISCONTINUED | OUTPATIENT
Start: 2022-08-04 | End: 2022-08-04

## 2022-08-04 RX ORDER — ONDANSETRON 2 MG/ML
INJECTION INTRAMUSCULAR; INTRAVENOUS
Status: DISCONTINUED | OUTPATIENT
Start: 2022-08-04 | End: 2022-08-04

## 2022-08-04 RX ORDER — INSULIN ASPART 100 [IU]/ML
5 INJECTION, SOLUTION INTRAVENOUS; SUBCUTANEOUS EVERY 4 HOURS
Status: DISCONTINUED | OUTPATIENT
Start: 2022-08-04 | End: 2022-08-05

## 2022-08-04 RX ORDER — ETOMIDATE 2 MG/ML
INJECTION INTRAVENOUS
Status: DISCONTINUED | OUTPATIENT
Start: 2022-08-04 | End: 2022-08-04

## 2022-08-04 RX ORDER — INSULIN ASPART 100 [IU]/ML
4 INJECTION, SOLUTION INTRAVENOUS; SUBCUTANEOUS EVERY 4 HOURS
Status: DISCONTINUED | OUTPATIENT
Start: 2022-08-04 | End: 2022-08-04

## 2022-08-04 RX ORDER — NEOSTIGMINE METHYLSULFATE 0.5 MG/ML
INJECTION, SOLUTION INTRAVENOUS
Status: DISCONTINUED | OUTPATIENT
Start: 2022-08-04 | End: 2022-08-04

## 2022-08-04 RX ORDER — METHIMAZOLE 10 MG/1
20 TABLET ORAL 3 TIMES DAILY
Status: DISCONTINUED | OUTPATIENT
Start: 2022-08-04 | End: 2022-08-05

## 2022-08-04 RX ORDER — PHENYLEPHRINE HCL IN 0.9% NACL 1 MG/10 ML
SYRINGE (ML) INTRAVENOUS
Status: DISCONTINUED | OUTPATIENT
Start: 2022-08-04 | End: 2022-08-04

## 2022-08-04 RX ORDER — POTASSIUM CHLORIDE 14.9 MG/ML
20 INJECTION INTRAVENOUS ONCE
Status: COMPLETED | OUTPATIENT
Start: 2022-08-04 | End: 2022-08-04

## 2022-08-04 RX ORDER — METHIMAZOLE 10 MG/1
20 TABLET ORAL 3 TIMES DAILY
Status: DISCONTINUED | OUTPATIENT
Start: 2022-08-04 | End: 2022-08-04

## 2022-08-04 RX ORDER — PREDNISONE 20 MG/1
80 TABLET ORAL DAILY
Status: DISCONTINUED | OUTPATIENT
Start: 2022-08-04 | End: 2022-08-05

## 2022-08-04 RX ORDER — NICARDIPINE HYDROCHLORIDE 0.2 MG/ML
0-15 INJECTION INTRAVENOUS CONTINUOUS
Status: DISCONTINUED | OUTPATIENT
Start: 2022-08-04 | End: 2022-08-09

## 2022-08-04 RX ADMIN — AMIODARONE HYDROCHLORIDE 400 MG: 200 TABLET ORAL at 02:08

## 2022-08-04 RX ADMIN — DEXMEDETOMIDINE HYDROCHLORIDE 0.6 MCG/KG/HR: 4 INJECTION INTRAVENOUS at 07:08

## 2022-08-04 RX ADMIN — ETOMIDATE 10 MG: 2 INJECTION INTRAVENOUS at 12:08

## 2022-08-04 RX ADMIN — ETOMIDATE 10 MG: 2 INJECTION INTRAVENOUS at 11:08

## 2022-08-04 RX ADMIN — FUROSEMIDE 40 MG: 10 INJECTION, SOLUTION INTRAMUSCULAR; INTRAVENOUS at 05:08

## 2022-08-04 RX ADMIN — Medication 100 MCG: at 12:08

## 2022-08-04 RX ADMIN — CHOLESTYRAMINE 4 G: 4 POWDER, FOR SUSPENSION ORAL at 02:08

## 2022-08-04 RX ADMIN — MIDODRINE HYDROCHLORIDE 15 MG: 5 TABLET ORAL at 02:08

## 2022-08-04 RX ADMIN — METRONIDAZOLE 500 MG: 500 TABLET ORAL at 09:08

## 2022-08-04 RX ADMIN — ACETYLCYSTEINE 4 ML: 100 SOLUTION ORAL; RESPIRATORY (INHALATION) at 07:08

## 2022-08-04 RX ADMIN — INSULIN ASPART 5 UNITS: 100 INJECTION, SOLUTION INTRAVENOUS; SUBCUTANEOUS at 12:08

## 2022-08-04 RX ADMIN — POLYETHYLENE GLYCOL 3350 17 G: 17 POWDER, FOR SOLUTION ORAL at 08:08

## 2022-08-04 RX ADMIN — PANTOPRAZOLE SODIUM 40 MG: 40 INJECTION, POWDER, FOR SOLUTION INTRAVENOUS at 08:08

## 2022-08-04 RX ADMIN — FENTANYL CITRATE 50 MCG: 50 INJECTION INTRAMUSCULAR; INTRAVENOUS at 01:08

## 2022-08-04 RX ADMIN — METHIMAZOLE 20 MG: 10 TABLET ORAL at 08:08

## 2022-08-04 RX ADMIN — ASPIRIN 81 MG CHEWABLE TABLET 81 MG: 81 TABLET CHEWABLE at 08:08

## 2022-08-04 RX ADMIN — LEVALBUTEROL HYDROCHLORIDE 0.63 MG: 0.63 SOLUTION RESPIRATORY (INHALATION) at 07:08

## 2022-08-04 RX ADMIN — INSULIN ASPART 2 UNITS: 100 INJECTION, SOLUTION INTRAVENOUS; SUBCUTANEOUS at 02:08

## 2022-08-04 RX ADMIN — POTASSIUM CHLORIDE 20 MEQ: 200 INJECTION, SOLUTION INTRAVENOUS at 05:08

## 2022-08-04 RX ADMIN — LEVALBUTEROL HYDROCHLORIDE 0.63 MG: 0.63 SOLUTION RESPIRATORY (INHALATION) at 01:08

## 2022-08-04 RX ADMIN — DEXMEDETOMIDINE HYDROCHLORIDE 0.6 MCG/KG/HR: 4 INJECTION INTRAVENOUS at 01:08

## 2022-08-04 RX ADMIN — ONDANSETRON 4 MG: 2 INJECTION INTRAMUSCULAR; INTRAVENOUS at 12:08

## 2022-08-04 RX ADMIN — Medication 0.04 MCG/KG/MIN: at 08:08

## 2022-08-04 RX ADMIN — INSULIN ASPART 5 UNITS: 100 INJECTION, SOLUTION INTRAVENOUS; SUBCUTANEOUS at 06:08

## 2022-08-04 RX ADMIN — CHOLESTYRAMINE 4 G: 4 POWDER, FOR SUSPENSION ORAL at 08:08

## 2022-08-04 RX ADMIN — FENTANYL CITRATE 50 MCG: 50 INJECTION INTRAMUSCULAR; INTRAVENOUS at 12:08

## 2022-08-04 RX ADMIN — FUROSEMIDE 2.5 MG/HR: 10 INJECTION, SOLUTION INTRAMUSCULAR; INTRAVENOUS at 08:08

## 2022-08-04 RX ADMIN — ACETYLCYSTEINE 4 ML: 100 SOLUTION ORAL; RESPIRATORY (INHALATION) at 12:08

## 2022-08-04 RX ADMIN — CEFEPIME 2 G: 2 INJECTION, POWDER, FOR SOLUTION INTRAVENOUS at 09:08

## 2022-08-04 RX ADMIN — VASOPRESSIN 0.04 UNITS/MIN: 20 INJECTION INTRAVENOUS at 08:08

## 2022-08-04 RX ADMIN — FUROSEMIDE 2.5 MG/HR: 10 INJECTION, SOLUTION INTRAMUSCULAR; INTRAVENOUS at 02:08

## 2022-08-04 RX ADMIN — Medication 200 MCG: at 12:08

## 2022-08-04 RX ADMIN — AMIODARONE HYDROCHLORIDE 400 MG: 200 TABLET ORAL at 08:08

## 2022-08-04 RX ADMIN — CEFEPIME 2 G: 2 INJECTION, POWDER, FOR SOLUTION INTRAVENOUS at 11:08

## 2022-08-04 RX ADMIN — ACETYLCYSTEINE 4 ML: 100 SOLUTION ORAL; RESPIRATORY (INHALATION) at 01:08

## 2022-08-04 RX ADMIN — Medication 0.04 MCG/KG/MIN: at 02:08

## 2022-08-04 RX ADMIN — DEXAMETHASONE SODIUM PHOSPHATE 4 MG: 4 INJECTION INTRA-ARTICULAR; INTRALESIONAL; INTRAMUSCULAR; INTRAVENOUS; SOFT TISSUE at 12:08

## 2022-08-04 RX ADMIN — EPINEPHRINE 5 MCG: 1 INJECTION, SOLUTION, CONCENTRATE INTRAVENOUS at 12:08

## 2022-08-04 RX ADMIN — GUAIFENESIN 300 MG: 100 SOLUTION ORAL at 05:08

## 2022-08-04 RX ADMIN — EPINEPHRINE 10 MCG: 1 INJECTION, SOLUTION, CONCENTRATE INTRAVENOUS at 01:08

## 2022-08-04 RX ADMIN — MEXILETINE HYDROCHLORIDE 400 MG: 200 CAPSULE ORAL at 09:08

## 2022-08-04 RX ADMIN — SODIUM CHLORIDE: 0.9 INJECTION, SOLUTION INTRAVENOUS at 11:08

## 2022-08-04 RX ADMIN — CHOLESTYRAMINE 4 G: 4 POWDER, FOR SUSPENSION ORAL at 05:08

## 2022-08-04 RX ADMIN — DEXMEDETOMIDINE HYDROCHLORIDE 0.6 MCG/KG/HR: 4 INJECTION INTRAVENOUS at 02:08

## 2022-08-04 RX ADMIN — HYDROMORPHONE HYDROCHLORIDE 1 MG: 1 INJECTION, SOLUTION INTRAMUSCULAR; INTRAVENOUS; SUBCUTANEOUS at 06:08

## 2022-08-04 RX ADMIN — MEXILETINE HYDROCHLORIDE 400 MG: 200 CAPSULE ORAL at 05:08

## 2022-08-04 RX ADMIN — DEXMEDETOMIDINE HYDROCHLORIDE 0.6 MCG/KG/HR: 4 INJECTION INTRAVENOUS at 08:08

## 2022-08-04 RX ADMIN — INSULIN ASPART 5 UNITS: 100 INJECTION, SOLUTION INTRAVENOUS; SUBCUTANEOUS at 08:08

## 2022-08-04 RX ADMIN — METHIMAZOLE 20 MG: 10 TABLET ORAL at 02:08

## 2022-08-04 RX ADMIN — INSULIN ASPART 4 UNITS: 100 INJECTION, SOLUTION INTRAVENOUS; SUBCUTANEOUS at 02:08

## 2022-08-04 RX ADMIN — METRONIDAZOLE 500 MG: 500 TABLET ORAL at 02:08

## 2022-08-04 RX ADMIN — INSULIN ASPART 5 UNITS: 100 INJECTION, SOLUTION INTRAVENOUS; SUBCUTANEOUS at 09:08

## 2022-08-04 RX ADMIN — GLYCOPYRROLATE 0.4 MG: 0.2 INJECTION INTRAMUSCULAR; INTRAVENOUS at 01:08

## 2022-08-04 RX ADMIN — HEPARIN SODIUM 2200 UNITS/HR: 5000 INJECTION INTRAVENOUS; SUBCUTANEOUS at 05:08

## 2022-08-04 RX ADMIN — NEOSTIGMINE METHYLSULFATE 4 MG: 0.5 INJECTION, SOLUTION INTRAVENOUS at 01:08

## 2022-08-04 RX ADMIN — METRONIDAZOLE 500 MG: 500 TABLET ORAL at 05:08

## 2022-08-04 RX ADMIN — MEXILETINE HYDROCHLORIDE 400 MG: 200 CAPSULE ORAL at 02:08

## 2022-08-04 RX ADMIN — METHIMAZOLE 30 MG: 10 TABLET ORAL at 08:08

## 2022-08-04 RX ADMIN — NICARDIPINE HYDROCHLORIDE 1 MG/HR: 0.2 INJECTION INTRAVENOUS at 05:08

## 2022-08-04 RX ADMIN — LEVALBUTEROL HYDROCHLORIDE 0.63 MG: 0.63 SOLUTION RESPIRATORY (INHALATION) at 12:08

## 2022-08-04 RX ADMIN — INSULIN ASPART 5 UNITS: 100 INJECTION, SOLUTION INTRAVENOUS; SUBCUTANEOUS at 04:08

## 2022-08-04 RX ADMIN — INSULIN DETEMIR 6 UNITS: 100 INJECTION, SOLUTION SUBCUTANEOUS at 08:08

## 2022-08-04 RX ADMIN — MIDAZOLAM 2 MG: 1 INJECTION INTRAMUSCULAR; INTRAVENOUS at 11:08

## 2022-08-04 RX ADMIN — ACETAMINOPHEN 999.01 MG: 160 SOLUTION ORAL at 05:08

## 2022-08-04 RX ADMIN — PREDNISONE 80 MG: 20 TABLET ORAL at 08:08

## 2022-08-04 RX ADMIN — VASOPRESSIN 0.04 UNITS/MIN: 20 INJECTION INTRAVENOUS at 02:08

## 2022-08-04 NOTE — PT/OT/SLP PROGRESS
Occupational Therapy   Co-Treatment with PT    Name: Tim Richards  MRN: [id number]  Admitting Diagnosis:  Left ventricular assist device (LVAD) complication  Day of Surgery    Recommendations:     Discharge Recommendations: rehabilitation facility  Discharge Equipment Recommendations:   (TBD)  Barriers to discharge:  None    Assessment:     Tim Richards is a 56 y.o. male with a medical diagnosis of Left ventricular assist device (LVAD) complication.  He presents with good motivation and participation. Pt eager to use BSC to have BM, requiring frequent re-direction to current functional deficits. He demonstrated improvement in sitting balance EOB, however continues to be limited by weakness and decreased endurance, Performance deficits affecting function are weakness, impaired endurance, impaired self care skills, impaired functional mobility, gait instability, impaired balance, decreased lower extremity function, decreased upper extremity function, decreased coordination. Pt would benefit from skilled OT services in order to maximize independence with ADLs and facilitate safe discharge. Because of patient's significant decline from PLOF, patient would benefit from Inpatient Rehab to maximize return to PLOF. Pt is an excellent candidate for inpatient rehabilitation, as he has a qualifying diagnosis, displays good activity tolerance, has experienced decline from PLOF, has good family support, and is motivated to participate in therapy.       Rehab Prognosis:  Good; patient would benefit from acute skilled OT services to address these deficits and reach maximum level of function.       Plan:     Patient to be seen 5 x/week to address the above listed problems via self-care/home management, therapeutic activities, therapeutic exercises, neuromuscular re-education  · Plan of Care Expires: 08/25/22  · Plan of Care Reviewed with: patient, spouse    Subjective     Pain/Comfort:  · Pain Rating 1: 0/10  · Pain Rating  Post-Intervention 1: 0/10    Objective:     Communicated with: RN and PT prior to session.  Patient found HOB elevated with telemetry, arterial line, ventilator, SCD, central line, sun catheter, LVAD, PICC line, wound vac (CVP) upon OT entry to room.    General Precautions: Standard, fall   Orthopedic Precautions:N/A   Braces: N/A  Respiratory Status: ventilator; ET tube 24 at lip prior to session, RT notified before and after session     Occupational Performance:     Bed Mobility:    · Patient completed Supine to Sit with maximal assistance and 2 persons  · Patient completed Sit to Supine with maximal assistance and 2 persons     Functional Mobility/Transfers:  · Patient completed Sit <> Stand Transfer with maximal assistance and of 2 persons  with  hand-held assist    · Able to achieve 1/3 erect   · x1 trial EOB  · Functional Mobility: unable    Activities of Daily Living:  · Toileting: pt eager touse C 2' need to have BM; pt reports he does not want to use a bed pan; explained in detail reasons why pt is not appropriate for BSC this date     EOB:  pt sat EOB x5 minutes with CGA  Reaching activity EOB using B UEs x4 reps     [facility] 12    Treatment & Education:  -Therapist provided facilitation and instruction of proper body mechanics, energy conservation, and fall prevention strategies during tasks listed above.  -Pt educated on role of OT, POC and goals for therapy  -Pt educated on importance of OOB activities with staff member assistance   -Pt verbalized understanding. Pt expressed no further concerns/questions  -Co-tx with PT performed due to need for education and assistance from two skilled therapy disciplines at pt's current functional level.      Patient left HOB elevated with all lines intact, call button in reach, RT notified and RN presentEducation:      GOALS:   Multidisciplinary Problems     Occupational Therapy Goals        Problem: Occupational Therapy    Goal Priority Disciplines  Outcome Interventions   Occupational Therapy Goal     OT, PT/OT Ongoing, Progressing    Description: Goals to be met by: 8/9/22     Patient will increase functional independence with ADLs by performing:    UE Dressing with Stand-by Assistance.  LE Dressing with Stand-by Assistance.  Grooming while standing at sink with Stand-by Assistance.  Toileting from toilet with Stand-by Assistance for hygiene and clothing management.   Toilet transfer to toilet with Stand-by Assistance.               Multidisciplinary Problems (Resolved)        Problem: Occupational Therapy    Goal Priority Disciplines Outcome Interventions   Occupational Therapy Goal   (Resolved)     OT, PT/OT Met           Problem: Occupational Therapy    Goal Priority Disciplines Outcome Interventions   Occupational Therapy Goal   (Resolved)     OT, PT/OT Met                    Time Tracking:     OT Date of Treatment: 08/04/22  OT Start Time: 0849  OT Stop Time: 0907  OT Total Time (min): 18 min    Billable Minutes:Therapeutic Exercise 18    OT/CARY: OT          8/4/2022

## 2022-08-04 NOTE — PROGRESS NOTES
Patient connected to portable monitor, ambu-bag with oxygen, LVAD batteries connected. Chart and emergency VAD bag sent with patient to OR. Consents in chart and sent with patient. Care assumed per OR Anesthesia team. [id number]

## 2022-08-04 NOTE — BRIEF OP NOTE
[facility] - Surgical Intensive Care  Brief Operative Note    SUMMARY     Surgery Date: 8/4/2022     Surgeon(s) and Role:     * Germain Holt MD - Primary     * Tony Wheeler MD - Resident - Assisting     * Hardeep Biswas MD - Resident - Assisting        Pre-op Diagnosis:  Acute on chronic combined systolic and diastolic congestive heart failure [I50.43]  LVAD (left ventricular assist device) present [Z95.811]    Post-op Diagnosis:  Post-Op Diagnosis Codes:     * Acute on chronic combined systolic and diastolic congestive heart failure [I50.43]     * LVAD (left ventricular assist device) present [Z95.811]    Procedure(s) (LRB):  CREATION, TRACHEOSTOMY (N/A)  INSERTION, CENTRAL VENOUS ACCESS DEVICE    Anesthesia: Choice    Operative Findings: Percutaneous placement of 8 Shiley tracheostomy without apparent complication.     Estimated Blood Loss: * No values recorded between 8/4/2022 12:32 PM and 8/4/2022  1:17 PM *    Estimated Blood Loss has not been documented. EBL = 1.         Specimens:   Specimen (24h ago, onward)            None          [id number]

## 2022-08-04 NOTE — ANESTHESIA PROCEDURE NOTES
Central Line    Diagnosis: respiratory failure  Patient location during procedure: done in OR  Timeout: 8/4/2022 1:00 PM  Procedure end time: 8/4/2022 1:12 PM    Staffing  Authorizing Provider: Emma Sifuentes MD  Performing Provider: Nicole A. Lombardi, CRNA    Staffing  Performed: anesthesiologist   Anesthesiologist: Emma Sifuentes MD  Anesthesiologist was present at the time of the procedure.  Preanesthetic Checklist  Completed: patient identified, IV checked, site marked, risks and benefits discussed, surgical consent, monitors and equipment checked, pre-op evaluation, timeout performed and anesthesia consent given  Indication   Indication: vascular access, med administration     Anesthesia   general anesthesia    Central Line   Skin Prep: skin prepped with ChloraPrep, skin prep agent completely dried prior to procedure  Sterile Barriers Followed: Yes    All five maximal barriers used- gloves, gown, cap, mask, and large sterile sheet    hand hygiene performed prior to central venous catheter insertion  Location: right subclavian.   Catheter type: quad lumen  Catheter Size: 8.5 Fr  Inserted Catheter Length: 16 cm  Ultrasound: none      Manometry: Venous cannualation confirmed by visual estimation of blood vessel pressure using manometry.  Insertion Attempts: 1   Securement:line sutured, chlorhexidine patch, sterile dressing applied and blood return through all ports    Post-Procedure        Guidewire Guidewire removed intact. Guidewire removed intact, verified with nurse.

## 2022-08-04 NOTE — PLAN OF CARE
Afternoon rounds update:   Plan:   - One dose of 40 mg IV lasix along with that will increase lasix to 5 mg/hr.   - If MAP goes above 85, will  start on nicardipine drip to keep MAP between 65 - 85 and will continue epinephrine and vasopressin for ionotrophic effect.   - Repeat hemodynamics and VBG in the evening.

## 2022-08-04 NOTE — CARE UPDATE
Received patient from O.R. with new #8 Shiley Trach placed. Placed back on vent with previous settings

## 2022-08-04 NOTE — SUBJECTIVE & OBJECTIVE
"Interval HPI:   Overnight events: No acute events reported overnight  Eating:   NPO  Nausea: No  Hypoglycemia and intervention: No  Fever: No  TPN and/or TF: Yes  If yes, type of TF/TPN and rate: TPN at 40 ml/hr    BP (!) 88/0   Pulse 68   Temp (!) 95.4 °F (35.2 °C) (Core Bladder)   Resp (!) 24   Ht 6' 1" (1.854 m)   Wt 91.6 kg (202 lb)   SpO2 99% Comment: from AM ABG  BMI 26.65 kg/m²     Labs Reviewed and Include    Recent Labs   Lab 08/04/22  0337   *   CALCIUM 9.3   ALBUMIN 2.1*   PROT 6.4      K 4.2   CO2 20*      BUN 57*   CREATININE 1.3   ALKPHOS 97   ALT 33   AST 33   BILITOT 5.3*     Lab Results   Component Value Date    WBC 21.69 (H) 08/04/2022    HGB 7.6 (L) 08/04/2022    HCT 25.7 (L) 08/04/2022    MCV 95 08/04/2022     08/04/2022     Recent Labs   Lab 08/03/22  0324 08/04/22  0337   FREET4 3.03* 3.46*     Lab Results   Component Value Date    HGBA1C 5.4 04/26/2021       Nutritional status:   Body mass index is 26.65 kg/m².  Lab Results   Component Value Date    ALBUMIN 2.1 (L) 08/04/2022    ALBUMIN 2.1 (L) 08/03/2022    ALBUMIN 2.1 (L) 08/03/2022     Lab Results   Component Value Date    PREALBUMIN 13 (L) 07/29/2022    PREALBUMIN 11 (L) 07/17/2022    PREALBUMIN 10 (L) 07/15/2022       Estimated Creatinine Clearance: 72.6 mL/min (based on SCr of 1.3 mg/dL).    Accu-Checks  Recent Labs     08/02/22  1614 08/02/22  1956 08/03/22  0057 08/03/22  0326 08/03/22  0826 08/03/22  1137 08/03/22  1612 08/03/22  1909 08/04/22  0048 08/04/22  0334   POCTGLUCOSE 206* 189* 117* 148* 179* 262* 253* 209* 174* 145*       Current Medications and/or Treatments Impacting Glycemic Control  Immunotherapy:    Immunosuppressants       None          Steroids:   Hormones (From admission, onward)                Start     Stop Route Frequency Ordered    08/04/22 0900  predniSONE tablet 80 mg         -- PER NG TUBE Daily 08/04/22 0706    08/02/22 1931  melatonin tablet 6 mg         -- OG Nightly PRN " 08/02/22 1832    07/15/22 0900  vasopressin (PITRESSIN) 1 Units/mL in dextrose 5 % 100 mL infusion         -- IV Continuous 07/15/22 0900          Pressors:    Autonomic Drugs (From admission, onward)                Start     Stop Route Frequency Ordered    07/29/22 0011  EPINEPHrine (ADRENALIN) 1 mg/mL injection        Note to Pharmacy: Created by cabinet override    07/29 1214   07/29/22 0011    07/29/22 0007  EPINEPHrine 5 mg in dextrose 5% 250 mL infusion (premix)        Question Answer Comment   Begin at (in mcg/kg/min): 0.01    Titrate by: (in mcg/kg/min) 0.01    Titrate interval: (in minutes) 10    Titrate to maintain: (SBP or MAP or Cardiac Index) MAP    Greater than: (in mmHg) 65    Maximum dose: (in mcg/kg/min) 2        -- IV Continuous 07/29/22 0008    07/23/22 1400  midodrine tablet 15 mg         -- PER NG TUBE Every 8 hours 07/23/22 0944          Hyperglycemia/Diabetes Medications:   Antihyperglycemics (From admission, onward)                Start     Stop Route Frequency Ordered    08/03/22 2100  insulin detemir U-100 pen 6 Units         -- SubQ Nightly 08/03/22 1900    08/03/22 2000  insulin aspart U-100 pen 5 Units         -- SubQ Every 4 hours 08/03/22 1721    07/30/22 1023  insulin aspart U-100 pen 0-5 Units         -- SubQ Every 4 hours PRN 07/30/22 0965

## 2022-08-04 NOTE — PROGRESS NOTES
[facility]  Heart Failure  Progress Note     Hospital Length of Stay: 38    Interval History:  - No significant events overnight.   - He did not have fever in last 24 hours.   - No significant event in LVAD recorded.       Vitals:  Temp:  [95.36 °F (35.2 °C)-98.96 °F (37.2 °C)] 98.78 °F (37.1 °C)  Pulse:  [] 100  Resp:  [22-25] 24  SpO2:  [99 %-100 %] 99 %  BP: ()/(0) 72/0  Arterial Line BP: ()/(52-86) 84/73    Intake/Output    Intake/Output Summary (Last 24 hours) at 8/4/2022 1304  Last data filed at 8/4/2022 1100  Gross per 24 hour   Intake 2657.89 ml   Output 3709 ml   Net -1051.11 ml       Physical Exam  Gen: Intubated and following some command.   HEENT: Pupils equal and reactive to light  Cardio: Regular rate, point of maximal impulse not displaced,1+ radial pulses bilaterally, 1+ DP pulses bilaterally, VAD hum obstructing heart sounds  Resp: crackles, rhonchi  Abd: Soft, non-tender, non-distended  Skin: Warm,  trace peripheral edema  Neuro: intubated. Unable to assess.   Psych: unable to assess     Labs:  Recent Labs   Lab 08/03/22  0324 08/03/22  1233 08/03/22 1910 08/03/22 2012 08/04/22  0337   WBC 20.75*  --  20.69*  --  21.69*   HGB 7.2* 7.3* 7.3*  --  7.6*   HCT 24.3* 25.1* 24.4* 25* 25.7*     --  334  --  333     Recent Labs   Lab 08/03/22  1138 08/03/22 1910 08/04/22  0337    141 141   K 4.1 4.7 4.2    108 110   CO2 21* 24 20*   BUN 59* 60* 57*   CREATININE 1.3 1.3 1.3   * 214* 137*   CALCIUM 9.4 9.3 9.3   MG 2.5 2.4 2.8*   PHOS 2.2* 3.9 2.8       Micro:    Current Meds:   acetylcysteine 100 mg/ml (10%)  4 mL Nebulization Q6H    amiodarone  400 mg Oral TID    aspirin  81 mg Per OG tube Daily    ceFEPime (MAXIPIME) IVPB  2 g Intravenous Q12H    cholestyramine  1 packet Per NG tube QID    guaiFENesin 100 mg/5 ml  300 mg Per NG tube Q6H    insulin aspart U-100  4 Units Subcutaneous Q4H    insulin detemir U-100  6 Units Subcutaneous  QHS    levalbuterol  0.63 mg Nebulization Q6H    methIMAzole  20 mg Per NG tube TID    metroNIDAZOLE  500 mg Oral Q8H    mexiletine  400 mg Oral Q8H    midodrine  15 mg Per NG tube Q8H    pantoprazole  40 mg Intravenous BID    polyethylene glycol  17 g Per NG tube BID    predniSONE  80 mg Per NG tube Daily       Continuous Infusions:   dexmedetomidine (PRECEDEX) infusion 0.6 mcg/kg/hr (08/04/22 1100)    dextrose 10 % in water (D10W)      EPINEPHrine 0.06 mcg/kg/min (08/04/22 1207)    furosemide (LASIX) 2 mg/mL continuous infusion (non-titrating) 2.5 mg/hr (08/04/22 1100)    heparin (porcine) in 5 % dex Stopped (08/04/22 0526)    TPN ADULT CENTRAL LINE CUSTOM 20 mL/hr at 08/04/22 1100    vasopressin 0.04 Units/min (08/04/22 1100)         Assessment and Plan:    Cardiogenic Shock  -Previous HM2, underwent pump exchange to HM3 on 7/13/22 complicated by worsening CS/RV failure requiring VA ECMO now decannulated  -Re-intubated on 7/29/22 due to hypercapnic resp failure  -Was transiently off epi and vasopressin, however, had to be placed back on it.   -HTS taking over as primary from today.   - NO has been weaned off.   - Plan for tracheostomy today.     LVAD  TXP LVAD INTERROGATIONS 7/31/2022 7/31/2022 7/31/2022 7/31/2022 7/31/2022 7/31/2022 7/31/2022   Type HeartMate3 HeartMate3 HeartMate3 HeartMate3 HeartMate3 HeartMate3 HeartMate3   Flow 5.3 5.5 5.3 5.3 5.3 5.4 5.5   Speed 6200 6200 6200 6200 6200 6200 6200   PI 3.2 3.3 3.3 3.3 3.1 3.2 3.1   Power ([place]) 5.2 5.1 5.1 5.2 5.1 5.1 5.1   LSL 5800 - - - 5800 - -   Low Flow Alarm - - - - - - -   Pulsatility Intermittent pulse - - - Intermittent pulse - -         Anxiety  -Currently intubated and sedated     History of ventricular tachycardia/Episode of A flutter 2:1 block  Patient experienced an episode of VT on 6/30 and experienced an ICD shock thought to be related to hypokalemia (K of 3.1 on 6/30)  - Aggressively replete K, keep K>4 and Mg>2  - Continue  mexiletine PO.  - Amio gtt transitioned to PO.        COVID-19 virus infection  -Previously hypoxic then recovered  -ID was consulted, recommended Remdesivir, course completed     Elevated serum lactate dehydrogenase (LDH)  S/p HM3 exchange for HM2 pump thrombosis  Continue to trend LDH.      amiodarone induced hypothyrodism initially, now hyperthyroidism  -Endocrine team on board.  -On prednisone and methimazole.   -T4 trending up. Medications has been changes as per Endocrinology team.      Chronic combined systolic and diastolic heart failure  ADHF  Underwent HM III upgrade on 7/13/22, currently on VA ECMO  Currently on Epi gtt, Vasopressin  Currently on Precedex  gtt for sedation  Being treated for sepsis.   On cefepime and flagyl.  Currently intubated and sedated (reintubarted 7/29)  Chest tube removal, bronch, and old driveline removed 7/22      Scot Card MD, FACP  Cardiovascular Disease Fellow PGY4  [facility]

## 2022-08-04 NOTE — PLAN OF CARE
Patient s/p tracheostomy and new central line placement today. R IJ central line removed and sent tip for culture. Patient remains on ventilator AC VC Rate 24, FiO2 50% and Peep 5. Epinephrine and Vasopressin restarted today due to decrease in MAPs and PIs. Epinephrine at 0.04 mcg/kg/min and Vasopressin at 0.04 Units/hr. Plan is to leave Vasopressin and Epinephrine infusing overnight and start Cardene if needed to maintain a MAP <85. Precedex remains infusing for sedation. Patient awake and alert, following commands, uses written communication. Lasix at 2.5 mg/hr, U/O 50-100cc/hr, CVP trending up to 12, MD aware. TPN decreased to 20 ml/hr. LVAD speed at 6300, Flows 5.5, PI increased from 1.3 to now >2.4, Hct updated. LVAD dressings to be changed today per SICU RN. Patient had 1 large liquid BM today. Plan is for possible SBT and trach colllar tomorrow if tolerated. Plan of care reviewed with patient and spouse at bedside. All questions answered.

## 2022-08-04 NOTE — PROGRESS NOTES
Report received from Waleska GALVEZ. Patient transported from OR to SICU [id number] s/p tracheostomy and Right subclavian central line placement. Upon arrival to SICU patient connected to SICU monitors and Ventilator. Stat labs sent, CXR ordered. AICD turned back on. HTS notified of patient's arrival and SICU RN at bedside. See flowsheet for complete documentation

## 2022-08-04 NOTE — PROGRESS NOTES
[facility]ia [facility]    Orders received to disable tachy therapies prior to tracheostomy procedure.  Tachy therapies DISABLED.  Bedside RN notified.    Please call [facility]ia [facility] ext [phone] once patient returns from procedure.

## 2022-08-04 NOTE — PLAN OF CARE
Patient awake/alert and able to follow commands, written communication used to discuss patient wishes. -225hr. Current gtts: lasix, precedex, tpn/lipids. Patient tunred Q2H, complete bath given and linen changed. Immerse bed plugged in and functioning properly. Surgery scheduled today for tracheostomy and central line replacement. Consents in chart.

## 2022-08-04 NOTE — PROGRESS NOTES
"[facility]  Endocrinology  Progress Note    Admit Date: 6/27/2022     56 year-old  male with NICM with LVAD, s/p ICD for VT on amiodarone and mexiletine and AIT followed by hypothyroidism initially admitted 6/27/2022 with COVID respiratory failure complicated with LVAD pump thrombosis that was refractory to medical therapy. Underwent LVAD pump exchange. Post-op course complicated by worsening cardiogenic shock/RV failure requiring VA-ECMO. Improved clinically underwent successful decannulation. Continued episodes of VT with ICD shock 7/21 and ongoing anti-arrhythmic mediation adjustments. TFTs on 7/27 significant for recurrent hyperthyroidism with undetectable TSH and elevated fT4.    - Patient re-intubated 07/29/2022    - Endocrinology consulted for recurrent AIT    Regarding AIT:    - Last seen outpatient by Dr. 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 He required a prolonged course of prednisone and methimazole, then subsequently developed hypothyroidism in May 2020. LT4 was adjusted based on serial TFT measurements. Most recently levothyroxine dose has been 112 mcg     - He self-decreased his amiodarone dose to 200 mg daily about 6 months ago. T4 was high on 7/13, but he was continued on levothyroxine 112 mcg    Lab Results   Component Value Date    TSH <0.010 (L) 07/27/2022    TSH 0.111 (L) 07/13/2022    TSH 4.079 (H) 03/17/2022    FREET4 3.46 (H) 08/04/2022    FREET4 3.03 (H) 08/03/2022    FREET4 2.59 (H) 08/02/2022       Interval HPI:   Overnight events: No acute events reported overnight  Eating:   NPO  Nausea: No  Hypoglycemia and intervention: No  Fever: No  TPN and/or TF: Yes  If yes, type of TF/TPN and rate: TPN at 40 ml/hr    BP (!) 88/0   Pulse 68   Temp (!) 95.4 °F (35.2 °C) (Core Bladder)   Resp (!) 24   Ht 6' 1" (1.854 m)   Wt 91.6 kg (202 lb)   SpO2 99% Comment: from AM ABG "  BMI 26.65 kg/m²     Labs Reviewed and Include    Recent Labs   Lab 08/04/22  0337   *   CALCIUM 9.3   ALBUMIN 2.1*   PROT 6.4      K 4.2   CO2 20*      BUN 57*   CREATININE 1.3   ALKPHOS 97   ALT 33   AST 33   BILITOT 5.3*     Lab Results   Component Value Date    WBC 21.69 (H) 08/04/2022    HGB 7.6 (L) 08/04/2022    HCT 25.7 (L) 08/04/2022    MCV 95 08/04/2022     08/04/2022     Recent Labs   Lab 08/03/22  0324 08/04/22  0337   FREET4 3.03* 3.46*     Lab Results   Component Value Date    HGBA1C 5.4 04/26/2021       Nutritional status:   Body mass index is 26.65 kg/m².  Lab Results   Component Value Date    ALBUMIN 2.1 (L) 08/04/2022    ALBUMIN 2.1 (L) 08/03/2022    ALBUMIN 2.1 (L) 08/03/2022     Lab Results   Component Value Date    PREALBUMIN 13 (L) 07/29/2022    PREALBUMIN 11 (L) 07/17/2022    PREALBUMIN 10 (L) 07/15/2022       Estimated Creatinine Clearance: 72.6 mL/min (based on SCr of 1.3 mg/dL).    Accu-Checks  Recent Labs     08/02/22  1614 08/02/22  1956 08/03/22  0057 08/03/22  0326 08/03/22  0826 08/03/22  1137 08/03/22  1612 08/03/22  1909 08/04/22  0048 08/04/22  0334   POCTGLUCOSE 206* 189* 117* 148* 179* 262* 253* 209* 174* 145*       Current Medications and/or Treatments Impacting Glycemic Control  Immunotherapy:    Immunosuppressants       None          Steroids:   Hormones (From admission, onward)                Start     Stop Route Frequency Ordered    08/04/22 0900  predniSONE tablet 80 mg         -- PER NG TUBE Daily 08/04/22 0706    08/02/22 1931  melatonin tablet 6 mg         -- OG Nightly PRN 08/02/22 1832    07/15/22 0900  vasopressin (PITRESSIN) 1 Units/mL in dextrose 5 % 100 mL infusion         -- IV Continuous 07/15/22 0900          Pressors:    Autonomic Drugs (From admission, onward)                Start     Stop Route Frequency Ordered    07/29/22 0011  EPINEPHrine (ADRENALIN) 1 mg/mL injection        Note to Pharmacy: Created by cabinet override    07/29  1214   07/29/22 0011    07/29/22 0007  EPINEPHrine 5 mg in dextrose 5% 250 mL infusion (premix)        Question Answer Comment   Begin at (in mcg/kg/min): 0.01    Titrate by: (in mcg/kg/min) 0.01    Titrate interval: (in minutes) 10    Titrate to maintain: (SBP or MAP or Cardiac Index) MAP    Greater than: (in mmHg) 65    Maximum dose: (in mcg/kg/min) 2        -- IV Continuous 07/29/22 0008    07/23/22 1400  midodrine tablet 15 mg         -- PER NG TUBE Every 8 hours 07/23/22 0944          Hyperglycemia/Diabetes Medications:   Antihyperglycemics (From admission, onward)                Start     Stop Route Frequency Ordered    08/03/22 2100  insulin detemir U-100 pen 6 Units         -- SubQ Nightly 08/03/22 1900    08/03/22 2000  insulin aspart U-100 pen 5 Units         -- SubQ Every 4 hours 08/03/22 1721    07/30/22 1023  insulin aspart U-100 pen 0-5 Units         -- SubQ Every 4 hours PRN 07/30/22 0925            ASSESSMENT and PLAN    Amiodarone-induced hyperthyroidism  Key History and Diagnostic Findings  - History of AIT type 2 (TRAb and TSI negative; Thyroid US unremarkable with low vascularity)  - Weaned off methimazole and prednisone by May 2020, then developed hypothyroidism, LT4 started and dose titrated per TFTs. Prior to current admission he was on LT4 112 mcg daily  - Labs consistent with hypothyroidism until current admission, initially on 7/13, with low TSH and elevated fT4. Repeat TFTs on 7/27 with worsening hyperthyroidism. He continued to receive LT4 112 mcg through 7/28. He remains on amiodarone for episodes of VT  - Suspect current hyperthyroidism is AIT, however continued exogenous LT4 certainly contribute to current presentation   - Free T4 increased further to 3.46 from 3.0 (from 2.6 prior). Liver enzymes except LD remain normal  Lab Results   Component Value Date    TSH <0.010 (L) 07/27/2022    TSH 0.111 (L) 07/13/2022    TSH 4.079 (H) 03/17/2022    FREET4 3.46 (H) 08/04/2022    FREET4 3.03  (H) 08/03/2022    FREET4 2.59 (H) 08/02/2022   - Noted plans for tracheostomy tube today    Plan  - Strongly suspect AIT (type 1 or 2); continuing empiric treatment for both  - Increase Methimazole from 20 mg BID up to TID  - Increase Prednisone from 60 up to 80 mg daily  - Continue Cholestyramine 4g QID  - Check T3 now and tomorrow morning in conjunction with the free T4, depending on T3 result today may initiate SSKI 5 drops every 6 hours    amiodarone induced hypothyrodism  - Levothyroxine discontinued on 7/28/2022  - Please see plan as above    Hyperglycemia  Key History and Diagnostic Findings  - Hyperglycemia noted once starting TPN in addition to steroids  - No prior history of diabetes; most recent A1c of 5.4 on 04/26/2021    Plan  - Started levemir 6 units QHS yesterday evening  - Decrease aspart from 5 down to 4 units q4h with low correction while on TPN  - Please notify endocrine if any change in TPN as this will change insulin requirements  - Please ensure that accuchecks are being documented q4h per order      Akira Zuñiga DO  [facility], [address]th [address]  [address] [place], [place], [address]    Office: [phone]  Fax: [phone]    Disclaimer: This note has been generated using voice-recognition software. There may be typographical errors that have been missed during proof-reading.    The above history labs imaging impression and plan were discussed with attending physician who is in agreement and also took part in this patient's care.  I personally reviewed all of the patients available medications, labs, imaging, vitals, allergies, medical history.

## 2022-08-04 NOTE — PT/OT/SLP PROGRESS
Physical Therapy  Co-Treatment with OT    Patient Name:  Tim Richards   MRN:  [id number]    Recommendations:     Discharge Recommendations:  rehabilitation facility   Discharge Equipment Recommendations:  (will determine DME needs closer to discharge)   Barriers to discharge: Decreased caregiver support family will not be able to assist at current functional level.     Assessment:     Tim Richards is a 56 y.o. male admitted with a medical diagnosis of Left ventricular assist device (LVAD) complication.  He presents with the following impairments/functional limitations:  weakness, impaired endurance, impaired functional mobility, gait instability, decreased safety awareness, decreased lower extremity function pt tolerated treatment better being able to sit on EOB with less assistance and being able to begin sit to stand. Pt will benefit from cont skilled PT 5x/wk to progress physically. Pt will need inpt rehab when medically stable to maximize rehab potential. .Pt was evaluated and discharged 6/29 and 7/6 due to being Independent. Pt had LVAD pump exchange 7/22/22.    Rehab Prognosis: Good; patient would benefit from acute skilled PT services to address these deficits and reach maximum level of function.    Recent Surgery: Procedure(s) (LRB):  CREATION, TRACHEOSTOMY (N/A)  INSERTION, CENTRAL VENOUS ACCESS DEVICE Day of Surgery    Plan:     During this hospitalization, patient to be seen 5 x/week to address the identified rehab impairments via gait training, therapeutic activities, neuromuscular re-education and progress toward the following goals:    · Plan of Care Expires:  08/20/22    Subjective     Chief Complaint: pt wrote that he wanted to use the bedside commode.  Patient/Family Comments/goals: to get better and go home.   Pain/Comfort:  · Pain Rating 1: 5/10 (abdomen)  · Pain Addressed 1: Distraction  · Pain Rating Post-Intervention 1: 5/10 (abdomen)      Objective:     Communicated with nurse prior  "to session.  Patient found supine with telemetry, arterial line, Tracheostomy, LVAD, SCD, sun catheter, central line (CVP, vent to ET tube, CVP) upon PT entry to room.     General Precautions: Standard, fall , sternal, LVAD  Orthopedic Precautions:N/A   Braces:    Respiratory Status: vent to ET tube, Respiratory confirmed ET tube placement and 24 on bottom lip prior to treatment. Respiratory approved treatment session with current vent settings. Respiratory therapist was called after treatment session to check tube placement.      Functional Mobility:  · Bed Mobility:  Pt needed verbal instructions for functional mobility with sternal precautions.  ET tube placement at 24 at bottom lip before and after treatment.   · Rolling Right: maximal assistance and of 2 persons  · Supine to Sit: maximal assistance and of 2 persons  · Sit to Supine: maximal assistance and of 2 persons  ·   · Transfers:     · Sit to Stand:  maximal assistance and of 2 persons with hand-held assist. Pt was able to get 1/2 way to upright posture with standing.   ·   · Balance: pt sat on EOB x 5 min with CGA.    Due to pt complex medical condition, the skill of 2 licensed therapists is needed to maximize treatment session and progression towards goals      AM-PAC 6 CLICK MOBILITY  Turning over in bed (including adjusting bedclothes, sheets and blankets)?: 2  Sitting down on and standing up from a chair with arms (e.g., wheelchair, bedside commode, etc.): 1  Moving from lying on back to sitting on the side of the bed?: 2  Moving to and from a bed to a chair (including a wheelchair)?: 1  Need to walk in hospital room?: 1  Climbing 3-5 steps with a railing?: 1  Basic Mobility Total Score: 8       Therapeutic Activities and Exercises:   pt and wife received verbal instructions in PT POC. Wife verbally expressed understanding of such and pt nodded "yes" that he understood.     Patient left supine with all lines intact, call button in reach and wife " and RN present..    GOALS:   Multidisciplinary Problems     Physical Therapy Goals        Problem: Physical Therapy    Goal Priority Disciplines Outcome Goal Variances Interventions   Physical Therapy Goal     PT, PT/OT Ongoing, Progressing     Description: Goals to be met by: 22    Patient will increase functional independence with mobility by performin. Supine to sit with MInimal Assistance -not met  2. Sit to stand transfer with Contact Guard Assistance -not met  3. Gait  x 150 feet with Contact Guard Assistance with approved assistive device -not met  4. Ascend/descend 8 stair with right Handrails Contact Guard Assistance -not met  5. Sitting at edge of bed x15 minutes with Contact Guard Assistance to improve tolerance to activities. -not met  6. Lower extremity exercise program x15 reps with assistance as needed -not met               Multidisciplinary Problems (Resolved)        Problem: Physical Therapy    Goal Priority Disciplines Outcome Goal Variances Interventions   Physical Therapy Goal   (Resolved)     PT, PT/OT Met     Description: The patient is near his functional baseline, he ambulated within the room with no AD and independence. Unable to do stair training, assess gait endurance due to COVID restrictions. He is safe to return home with no therapy needs. He is in agreement with PT discharge, he states he is confident he can ascend/descend his stairs.           Problem: Physical Therapy    Goal Priority Disciplines Outcome Goal Variances Interventions   Physical Therapy Goal   (Resolved)     PT, PT/OT Met                     Time Tracking:     PT Received On: 22  PT Start Time: 849     PT Stop Time: 906  PT Total Time (min): 17 min     Billable Minutes: Therapeutic Activity 17 min    Treatment Type: Treatment  PT/PTA: PT     PTA Visit Number: 0     2022

## 2022-08-04 NOTE — TRANSFER OF CARE
"Anesthesia Transfer of Care Note    Patient: Tim Richards    Procedure(s) Performed: Procedure(s) (LRB):  CREATION, TRACHEOSTOMY (N/A)  INSERTION, CENTRAL VENOUS ACCESS DEVICE    Patient location: ICU ( [address])    Anesthesia Type: general    Transport from OR: Transported from OR intubated on 100% O2 by AMBU with assisted ventilation. Continuous ECG monitoring in transport. Continuos invasive BP monitoring in transport    Post pain: adequate analgesia    Post assessment: no apparent anesthetic complications and tolerated procedure well    Post vital signs: stable    Level of consciousness: awake    Nausea/Vomiting: no nausea/vomiting    Complications: none    Transfer of care protocol was followed      Last vitals:   Visit Vitals  BP (!) 72/0 (BP Location: Right arm, Patient Position: Lying)   Pulse 98   Temp 37.9 °C (100.22 °F) (Core Bladder)   Resp (!) 24   Ht 6' 1" (1.854 m)   Wt 91.6 kg (202 lb)   SpO2 99%   BMI 26.65 kg/m²     "

## 2022-08-04 NOTE — PROGRESS NOTES
08/04/2022  Shirley Porras    Current provider:  Yg Kaufman MD    Device interrogation:  TXP LVAD INTERROGATIONS 8/4/2022 8/4/2022 8/4/2022 8/4/2022 8/4/2022 8/4/2022 8/4/2022   Type HeartMate3 HeartMate3 HeartMate3 HeartMate3 HeartMate3 HeartMate3 HeartMate3   Flow 5.8 5.9 5. 5.7 5.8 5.7 5.6   Speed 6300 6300 6300 6300 6300 6300 6300   PI 1.5 1.3 1.5 1.6 1.7 1.8 1.8   Power (Jackson) 5.3 5.4 5.4 5.3 5.3 5.4 5.4   LSL 5900 5900 5900 5900 5900 5900 5900   Low Flow Alarm - - - - - - -   Pulsatility Intermittent pulse Intermittent pulse Intermittent pulse Intermittent pulse Intermittent pulse Intermittent pulse Intermittent pulse          Rounded on Tim Richards to ensure all mechanical assist device settings (IABP or VAD) were appropriate and all parameters were within limits.  I was able to ensure all back up equipment was present, the staff had no issues, and the Perfusion Department daily rounding was complete.      For implantable VADs: Interrogation of Ventricular assist device was performed with analysis of device parameters and review of device function. I have personally reviewed the interrogation findings and agree with findings as stated.     In emergency, the nursing units have been notified to contact the perfusion department either by:  Calling [phone] from 630am to 4pm Mon thru Fri, utilizing the On-Call Finder functionality of Epic and searching for Perfusion, or by contacting the hospital  from 4pm to 630am and on weekends and asking to speak with the perfusionist on call.    9:13 AM

## 2022-08-04 NOTE — PROGRESS NOTES
GENERAL SURGERY    Pt seen and examined.  Still on minimal vent settings, nitric off, off pressors, hep gtt off at 0530.  To OR today for tracheostomy.  Consent in chart and media tab.    Tony Wheeler MD  General Surgery, PGY-5  [phone]

## 2022-08-05 LAB
ALBUMIN SERPL BCP-MCNC: 2.3 G/DL (ref 3.5–5.2)
ALBUMIN SERPL BCP-MCNC: 2.3 G/DL (ref 3.5–5.2)
ALBUMIN SERPL BCP-MCNC: 2.5 G/DL (ref 3.5–5.2)
ALLENS TEST: ABNORMAL
ALP SERPL-CCNC: 106 U/L (ref 55–135)
ALP SERPL-CCNC: 111 U/L (ref 55–135)
ALP SERPL-CCNC: 99 U/L (ref 55–135)
ALT SERPL W/O P-5'-P-CCNC: 41 U/L (ref 10–44)
ALT SERPL W/O P-5'-P-CCNC: 41 U/L (ref 10–44)
ALT SERPL W/O P-5'-P-CCNC: 47 U/L (ref 10–44)
ANION GAP SERPL CALC-SCNC: 11 MMOL/L (ref 8–16)
ANION GAP SERPL CALC-SCNC: 11 MMOL/L (ref 8–16)
ANION GAP SERPL CALC-SCNC: 7 MMOL/L (ref 8–16)
ANISOCYTOSIS BLD QL SMEAR: SLIGHT
ANISOCYTOSIS BLD QL SMEAR: SLIGHT
APTT BLDCRRT: 38.7 SEC (ref 21–32)
APTT BLDCRRT: 39.2 SEC (ref 21–32)
AST SERPL-CCNC: 40 U/L (ref 10–40)
AST SERPL-CCNC: 43 U/L (ref 10–40)
AST SERPL-CCNC: 45 U/L (ref 10–40)
BASO STIPL BLD QL SMEAR: ABNORMAL
BASOPHILS # BLD AUTO: 0.03 K/UL (ref 0–0.2)
BASOPHILS # BLD AUTO: 0.04 K/UL (ref 0–0.2)
BASOPHILS # BLD AUTO: 0.04 K/UL (ref 0–0.2)
BASOPHILS NFR BLD: 0.1 % (ref 0–1.9)
BILIRUB SERPL-MCNC: 5.3 MG/DL (ref 0.1–1)
BILIRUB SERPL-MCNC: 5.7 MG/DL (ref 0.1–1)
BILIRUB SERPL-MCNC: 5.8 MG/DL (ref 0.1–1)
BNP SERPL-MCNC: 1806 PG/ML (ref 0–99)
BUN SERPL-MCNC: 54 MG/DL (ref 6–20)
BUN SERPL-MCNC: 55 MG/DL (ref 6–20)
BUN SERPL-MCNC: 58 MG/DL (ref 6–20)
BURR CELLS BLD QL SMEAR: ABNORMAL
CALCIUM SERPL-MCNC: 9.4 MG/DL (ref 8.7–10.5)
CALCIUM SERPL-MCNC: 9.7 MG/DL (ref 8.7–10.5)
CALCIUM SERPL-MCNC: 9.9 MG/DL (ref 8.7–10.5)
CHLORIDE SERPL-SCNC: 106 MMOL/L (ref 95–110)
CHLORIDE SERPL-SCNC: 107 MMOL/L (ref 95–110)
CHLORIDE SERPL-SCNC: 109 MMOL/L (ref 95–110)
CO2 SERPL-SCNC: 19 MMOL/L (ref 23–29)
CO2 SERPL-SCNC: 21 MMOL/L (ref 23–29)
CO2 SERPL-SCNC: 23 MMOL/L (ref 23–29)
CREAT SERPL-MCNC: 1.3 MG/DL (ref 0.5–1.4)
CREAT SERPL-MCNC: 1.5 MG/DL (ref 0.5–1.4)
CREAT SERPL-MCNC: 1.5 MG/DL (ref 0.5–1.4)
CRP SERPL-MCNC: 13.1 MG/L (ref 0–8.2)
DACRYOCYTES BLD QL SMEAR: ABNORMAL
DELSYS: ABNORMAL
DIFFERENTIAL METHOD: ABNORMAL
EOSINOPHIL # BLD AUTO: 0 K/UL (ref 0–0.5)
EOSINOPHIL NFR BLD: 0 % (ref 0–8)
ERYTHROCYTE [DISTWIDTH] IN BLOOD BY AUTOMATED COUNT: 19.8 % (ref 11.5–14.5)
ERYTHROCYTE [DISTWIDTH] IN BLOOD BY AUTOMATED COUNT: 19.8 % (ref 11.5–14.5)
ERYTHROCYTE [DISTWIDTH] IN BLOOD BY AUTOMATED COUNT: 19.9 % (ref 11.5–14.5)
ERYTHROCYTE [SEDIMENTATION RATE] IN BLOOD BY WESTERGREN METHOD: 24 MM/H
EST. GFR  (NO RACE VARIABLE): 54.6 ML/MIN/1.73 M^2
EST. GFR  (NO RACE VARIABLE): 54.6 ML/MIN/1.73 M^2
EST. GFR  (NO RACE VARIABLE): >60 ML/MIN/1.73 M^2
FACT X PPP CHRO-ACNC: 0.29 IU/ML (ref 0.3–0.7)
FACT X PPP CHRO-ACNC: 0.37 IU/ML (ref 0.3–0.7)
FACT X PPP CHRO-ACNC: 0.42 IU/ML (ref 0.3–0.7)
FACT X PPP CHRO-ACNC: 0.42 IU/ML (ref 0.3–0.7)
FACT X PPP CHRO-ACNC: 0.47 IU/ML (ref 0.3–0.7)
FACT X PPP CHRO-ACNC: 0.49 IU/ML (ref 0.3–0.7)
FACT X PPP CHRO-ACNC: 0.49 IU/ML (ref 0.3–0.7)
FIBRINOGEN PPP-MCNC: 650 MG/DL (ref 182–400)
FIO2: 40
GIANT PLATELETS BLD QL SMEAR: PRESENT
GLUCOSE SERPL-MCNC: 135 MG/DL (ref 70–110)
GLUCOSE SERPL-MCNC: 141 MG/DL (ref 70–110)
GLUCOSE SERPL-MCNC: 143 MG/DL (ref 70–110)
HCO3 UR-SCNC: 21.9 MMOL/L (ref 24–28)
HCO3 UR-SCNC: 22.6 MMOL/L (ref 24–28)
HCO3 UR-SCNC: 23.5 MMOL/L (ref 24–28)
HCO3 UR-SCNC: 24.2 MMOL/L (ref 24–28)
HCT VFR BLD AUTO: 25.8 % (ref 40–54)
HCT VFR BLD AUTO: 26.4 % (ref 40–54)
HCT VFR BLD AUTO: 26.9 % (ref 40–54)
HGB BLD-MCNC: 7.8 G/DL (ref 14–18)
HGB BLD-MCNC: 7.9 G/DL (ref 14–18)
HGB BLD-MCNC: 8.2 G/DL (ref 14–18)
HYPOCHROMIA BLD QL SMEAR: ABNORMAL
HYPOCHROMIA BLD QL SMEAR: ABNORMAL
IMM GRANULOCYTES # BLD AUTO: 0.85 K/UL (ref 0–0.04)
IMM GRANULOCYTES # BLD AUTO: 0.95 K/UL (ref 0–0.04)
IMM GRANULOCYTES # BLD AUTO: 0.96 K/UL (ref 0–0.04)
IMM GRANULOCYTES NFR BLD AUTO: 3.1 % (ref 0–0.5)
IMM GRANULOCYTES NFR BLD AUTO: 3.2 % (ref 0–0.5)
IMM GRANULOCYTES NFR BLD AUTO: 3.3 % (ref 0–0.5)
INR PPP: 1.2 (ref 0.8–1.2)
INR PPP: 1.2 (ref 0.8–1.2)
INR PPP: 1.3 (ref 0.8–1.2)
LDH SERPL L TO P-CCNC: 228 U/L (ref 110–260)
LYMPHOCYTES # BLD AUTO: 0.7 K/UL (ref 1–4.8)
LYMPHOCYTES NFR BLD: 2.2 % (ref 18–48)
LYMPHOCYTES NFR BLD: 2.4 % (ref 18–48)
LYMPHOCYTES NFR BLD: 2.6 % (ref 18–48)
MAGNESIUM SERPL-MCNC: 2.1 MG/DL (ref 1.6–2.6)
MAGNESIUM SERPL-MCNC: 2.2 MG/DL (ref 1.6–2.6)
MAGNESIUM SERPL-MCNC: 2.4 MG/DL (ref 1.6–2.6)
MCH RBC QN AUTO: 28.3 PG (ref 27–31)
MCH RBC QN AUTO: 28.5 PG (ref 27–31)
MCH RBC QN AUTO: 29.2 PG (ref 27–31)
MCHC RBC AUTO-ENTMCNC: 29.9 G/DL (ref 32–36)
MCHC RBC AUTO-ENTMCNC: 30.2 G/DL (ref 32–36)
MCHC RBC AUTO-ENTMCNC: 30.5 G/DL (ref 32–36)
MCV RBC AUTO: 94 FL (ref 82–98)
MCV RBC AUTO: 95 FL (ref 82–98)
MCV RBC AUTO: 96 FL (ref 82–98)
MODE: ABNORMAL
MONOCYTES # BLD AUTO: 1.7 K/UL (ref 0.3–1)
MONOCYTES # BLD AUTO: 1.7 K/UL (ref 0.3–1)
MONOCYTES # BLD AUTO: 2.1 K/UL (ref 0.3–1)
MONOCYTES NFR BLD: 5.6 % (ref 4–15)
MONOCYTES NFR BLD: 6.5 % (ref 4–15)
MONOCYTES NFR BLD: 7.2 % (ref 4–15)
NEUTROPHILS # BLD AUTO: 23.2 K/UL (ref 1.8–7.7)
NEUTROPHILS # BLD AUTO: 25.3 K/UL (ref 1.8–7.7)
NEUTROPHILS # BLD AUTO: 26.9 K/UL (ref 1.8–7.7)
NEUTROPHILS NFR BLD: 87 % (ref 38–73)
NEUTROPHILS NFR BLD: 87.6 % (ref 38–73)
NEUTROPHILS NFR BLD: 89 % (ref 38–73)
NRBC BLD-RTO: 0 /100 WBC
OVALOCYTES BLD QL SMEAR: ABNORMAL
OVALOCYTES BLD QL SMEAR: ABNORMAL
PCO2 BLDA: 32.8 MMHG (ref 35–45)
PCO2 BLDA: 38 MMHG (ref 35–45)
PCO2 BLDA: 38.5 MMHG (ref 35–45)
PCO2 BLDA: 40 MMHG (ref 35–45)
PEEP: 5
PEEP: 5
PH SMN: 7.36 [PH] (ref 7.35–7.45)
PH SMN: 7.39 [PH] (ref 7.35–7.45)
PH SMN: 7.41 [PH] (ref 7.35–7.45)
PH SMN: 7.43 [PH] (ref 7.35–7.45)
PHOSPHATE SERPL-MCNC: 3.1 MG/DL (ref 2.7–4.5)
PHOSPHATE SERPL-MCNC: 3.1 MG/DL (ref 2.7–4.5)
PHOSPHATE SERPL-MCNC: 3.9 MG/DL (ref 2.7–4.5)
PLATELET # BLD AUTO: 372 K/UL (ref 150–450)
PLATELET # BLD AUTO: 374 K/UL (ref 150–450)
PLATELET # BLD AUTO: 375 K/UL (ref 150–450)
PLATELET BLD QL SMEAR: ABNORMAL
PLATELET BLD QL SMEAR: ABNORMAL
PMV BLD AUTO: 11.7 FL (ref 9.2–12.9)
PMV BLD AUTO: 11.8 FL (ref 9.2–12.9)
PMV BLD AUTO: 11.9 FL (ref 9.2–12.9)
PO2 BLDA: 112 MMHG (ref 80–100)
PO2 BLDA: 32 MMHG (ref 40–60)
PO2 BLDA: 35 MMHG (ref 40–60)
PO2 BLDA: 36 MMHG (ref 40–60)
POC BE: -1 MMOL/L
POC BE: -2 MMOL/L
POC BE: -3 MMOL/L
POC BE: 0 MMOL/L
POC SATURATED O2: 62 % (ref 95–100)
POC SATURATED O2: 66 % (ref 95–100)
POC SATURATED O2: 66 % (ref 95–100)
POC SATURATED O2: 99 % (ref 95–100)
POC TCO2: 23 MMOL/L (ref 23–27)
POC TCO2: 24 MMOL/L (ref 24–29)
POC TCO2: 25 MMOL/L (ref 24–29)
POC TCO2: 25 MMOL/L (ref 24–29)
POCT GLUCOSE: 125 MG/DL (ref 70–110)
POCT GLUCOSE: 125 MG/DL (ref 70–110)
POCT GLUCOSE: 138 MG/DL (ref 70–110)
POCT GLUCOSE: 151 MG/DL (ref 70–110)
POCT GLUCOSE: 155 MG/DL (ref 70–110)
POCT GLUCOSE: 183 MG/DL (ref 70–110)
POIKILOCYTOSIS BLD QL SMEAR: SLIGHT
POIKILOCYTOSIS BLD QL SMEAR: SLIGHT
POLYCHROMASIA BLD QL SMEAR: ABNORMAL
POLYCHROMASIA BLD QL SMEAR: ABNORMAL
POTASSIUM SERPL-SCNC: 3.7 MMOL/L (ref 3.5–5.1)
POTASSIUM SERPL-SCNC: 3.9 MMOL/L (ref 3.5–5.1)
POTASSIUM SERPL-SCNC: 5 MMOL/L (ref 3.5–5.1)
PREALB SERPL-MCNC: 28 MG/DL (ref 20–43)
PROT SERPL-MCNC: 6.8 G/DL (ref 6–8.4)
PROT SERPL-MCNC: 7.2 G/DL (ref 6–8.4)
PROT SERPL-MCNC: 7.5 G/DL (ref 6–8.4)
PROTHROMBIN TIME: 12.3 SEC (ref 9–12.5)
PROTHROMBIN TIME: 12.7 SEC (ref 9–12.5)
PROTHROMBIN TIME: 13 SEC (ref 9–12.5)
RBC # BLD AUTO: 2.74 M/UL (ref 4.6–6.2)
RBC # BLD AUTO: 2.79 M/UL (ref 4.6–6.2)
RBC # BLD AUTO: 2.81 M/UL (ref 4.6–6.2)
SAMPLE: ABNORMAL
SCHISTOCYTES BLD QL SMEAR: PRESENT
SCHISTOCYTES BLD QL SMEAR: PRESENT
SITE: ABNORMAL
SODIUM SERPL-SCNC: 136 MMOL/L (ref 136–145)
SODIUM SERPL-SCNC: 137 MMOL/L (ref 136–145)
SODIUM SERPL-SCNC: 141 MMOL/L (ref 136–145)
SPHEROCYTES BLD QL SMEAR: ABNORMAL
STOMATOCYTES BLD QL SMEAR: ABNORMAL
T3 SERPL-MCNC: 40 NG/DL (ref 60–180)
T4 FREE SERPL-MCNC: 2.18 NG/DL (ref 0.71–1.51)
TARGETS BLD QL SMEAR: ABNORMAL
TARGETS BLD QL SMEAR: ABNORMAL
TOXIC GRANULES BLD QL SMEAR: PRESENT
VT: 550
WBC # BLD AUTO: 26.49 K/UL (ref 3.9–12.7)
WBC # BLD AUTO: 29.08 K/UL (ref 3.9–12.7)
WBC # BLD AUTO: 30.3 K/UL (ref 3.9–12.7)
WBC TOXIC VACUOLES BLD QL SMEAR: ABNORMAL
WBC TOXIC VACUOLES BLD QL SMEAR: PRESENT

## 2022-08-05 PROCEDURE — 85730 THROMBOPLASTIN TIME PARTIAL: CPT | Mod: 91 | Performed by: INTERNAL MEDICINE

## 2022-08-05 PROCEDURE — 99900035 HC TECH TIME PER 15 MIN (STAT)

## 2022-08-05 PROCEDURE — 85610 PROTHROMBIN TIME: CPT | Mod: 91 | Performed by: THORACIC SURGERY (CARDIOTHORACIC VASCULAR SURGERY)

## 2022-08-05 PROCEDURE — 25000003 PHARM REV CODE 250

## 2022-08-05 PROCEDURE — 27000221 HC OXYGEN, UP TO 24 HOURS

## 2022-08-05 PROCEDURE — 25000003 PHARM REV CODE 250: Performed by: PHYSICIAN ASSISTANT

## 2022-08-05 PROCEDURE — 94640 AIRWAY INHALATION TREATMENT: CPT

## 2022-08-05 PROCEDURE — 63600175 PHARM REV CODE 636 W HCPCS: Performed by: STUDENT IN AN ORGANIZED HEALTH CARE EDUCATION/TRAINING PROGRAM

## 2022-08-05 PROCEDURE — 85730 THROMBOPLASTIN TIME PARTIAL: CPT | Performed by: THORACIC SURGERY (CARDIOTHORACIC VASCULAR SURGERY)

## 2022-08-05 PROCEDURE — 97110 THERAPEUTIC EXERCISES: CPT

## 2022-08-05 PROCEDURE — 97112 NEUROMUSCULAR REEDUCATION: CPT

## 2022-08-05 PROCEDURE — 84439 ASSAY OF FREE THYROXINE: CPT | Mod: 91 | Performed by: STUDENT IN AN ORGANIZED HEALTH CARE EDUCATION/TRAINING PROGRAM

## 2022-08-05 PROCEDURE — 85520 HEPARIN ASSAY: CPT | Mod: 91 | Performed by: INTERNAL MEDICINE

## 2022-08-05 PROCEDURE — 86140 C-REACTIVE PROTEIN: CPT | Performed by: STUDENT IN AN ORGANIZED HEALTH CARE EDUCATION/TRAINING PROGRAM

## 2022-08-05 PROCEDURE — 25000003 PHARM REV CODE 250: Performed by: STUDENT IN AN ORGANIZED HEALTH CARE EDUCATION/TRAINING PROGRAM

## 2022-08-05 PROCEDURE — 99291 PR CRITICAL CARE, E/M 30-74 MINUTES: ICD-10-PCS | Mod: ,,, | Performed by: INTERNAL MEDICINE

## 2022-08-05 PROCEDURE — 83735 ASSAY OF MAGNESIUM: CPT | Performed by: THORACIC SURGERY (CARDIOTHORACIC VASCULAR SURGERY)

## 2022-08-05 PROCEDURE — 63600175 PHARM REV CODE 636 W HCPCS

## 2022-08-05 PROCEDURE — 25000242 PHARM REV CODE 250 ALT 637 W/ HCPCS: Performed by: THORACIC SURGERY (CARDIOTHORACIC VASCULAR SURGERY)

## 2022-08-05 PROCEDURE — 82803 BLOOD GASES ANY COMBINATION: CPT

## 2022-08-05 PROCEDURE — 94003 VENT MGMT INPAT SUBQ DAY: CPT

## 2022-08-05 PROCEDURE — 27200966 HC CLOSED SUCTION SYSTEM

## 2022-08-05 PROCEDURE — 83615 LACTATE (LD) (LDH) ENZYME: CPT | Performed by: STUDENT IN AN ORGANIZED HEALTH CARE EDUCATION/TRAINING PROGRAM

## 2022-08-05 PROCEDURE — 25000242 PHARM REV CODE 250 ALT 637 W/ HCPCS

## 2022-08-05 PROCEDURE — 84134 ASSAY OF PREALBUMIN: CPT | Performed by: STUDENT IN AN ORGANIZED HEALTH CARE EDUCATION/TRAINING PROGRAM

## 2022-08-05 PROCEDURE — 25000003 PHARM REV CODE 250: Performed by: THORACIC SURGERY (CARDIOTHORACIC VASCULAR SURGERY)

## 2022-08-05 PROCEDURE — 85025 COMPLETE CBC W/AUTO DIFF WBC: CPT | Mod: 91 | Performed by: THORACIC SURGERY (CARDIOTHORACIC VASCULAR SURGERY)

## 2022-08-05 PROCEDURE — 99232 PR SUBSEQUENT HOSPITAL CARE,LEVL II: ICD-10-PCS | Mod: ,,, | Performed by: INTERNAL MEDICINE

## 2022-08-05 PROCEDURE — 99233 PR SUBSEQUENT HOSPITAL CARE,LEVL III: ICD-10-PCS | Mod: ,,, | Performed by: STUDENT IN AN ORGANIZED HEALTH CARE EDUCATION/TRAINING PROGRAM

## 2022-08-05 PROCEDURE — 99900026 HC AIRWAY MAINTENANCE (STAT)

## 2022-08-05 PROCEDURE — 85384 FIBRINOGEN ACTIVITY: CPT | Performed by: THORACIC SURGERY (CARDIOTHORACIC VASCULAR SURGERY)

## 2022-08-05 PROCEDURE — C9113 INJ PANTOPRAZOLE SODIUM, VIA: HCPCS

## 2022-08-05 PROCEDURE — 83880 ASSAY OF NATRIURETIC PEPTIDE: CPT | Performed by: STUDENT IN AN ORGANIZED HEALTH CARE EDUCATION/TRAINING PROGRAM

## 2022-08-05 PROCEDURE — 80053 COMPREHEN METABOLIC PANEL: CPT | Performed by: THORACIC SURGERY (CARDIOTHORACIC VASCULAR SURGERY)

## 2022-08-05 PROCEDURE — 37799 UNLISTED PX VASCULAR SURGERY: CPT

## 2022-08-05 PROCEDURE — 20000000 HC ICU ROOM

## 2022-08-05 PROCEDURE — 27000248 HC VAD-ADDITIONAL DAY

## 2022-08-05 PROCEDURE — 84480 ASSAY TRIIODOTHYRONINE (T3): CPT | Performed by: STUDENT IN AN ORGANIZED HEALTH CARE EDUCATION/TRAINING PROGRAM

## 2022-08-05 PROCEDURE — 84439 ASSAY OF FREE THYROXINE: CPT | Performed by: STUDENT IN AN ORGANIZED HEALTH CARE EDUCATION/TRAINING PROGRAM

## 2022-08-05 PROCEDURE — 99291 CRITICAL CARE FIRST HOUR: CPT | Mod: ,,, | Performed by: INTERNAL MEDICINE

## 2022-08-05 PROCEDURE — 84100 ASSAY OF PHOSPHORUS: CPT | Performed by: THORACIC SURGERY (CARDIOTHORACIC VASCULAR SURGERY)

## 2022-08-05 PROCEDURE — 99233 SBSQ HOSP IP/OBS HIGH 50: CPT | Mod: ,,, | Performed by: STUDENT IN AN ORGANIZED HEALTH CARE EDUCATION/TRAINING PROGRAM

## 2022-08-05 PROCEDURE — 94664 DEMO&/EVAL PT USE INHALER: CPT

## 2022-08-05 PROCEDURE — 99232 SBSQ HOSP IP/OBS MODERATE 35: CPT | Mod: ,,, | Performed by: INTERNAL MEDICINE

## 2022-08-05 PROCEDURE — 85520 HEPARIN ASSAY: CPT | Mod: 91 | Performed by: THORACIC SURGERY (CARDIOTHORACIC VASCULAR SURGERY)

## 2022-08-05 PROCEDURE — 97535 SELF CARE MNGMENT TRAINING: CPT

## 2022-08-05 PROCEDURE — 94645 CONT INHLJ TX EACH ADDL HOUR: CPT

## 2022-08-05 PROCEDURE — 97530 THERAPEUTIC ACTIVITIES: CPT

## 2022-08-05 RX ORDER — INSULIN ASPART 100 [IU]/ML
5 INJECTION, SOLUTION INTRAVENOUS; SUBCUTANEOUS
Status: DISCONTINUED | OUTPATIENT
Start: 2022-08-05 | End: 2022-08-10

## 2022-08-05 RX ORDER — PREDNISONE 20 MG/1
60 TABLET ORAL DAILY
Status: DISCONTINUED | OUTPATIENT
Start: 2022-08-05 | End: 2022-08-10

## 2022-08-05 RX ORDER — POTASSIUM CHLORIDE 20 MEQ/1
40 TABLET, EXTENDED RELEASE ORAL 2 TIMES DAILY
Status: DISCONTINUED | OUTPATIENT
Start: 2022-08-05 | End: 2022-08-05

## 2022-08-05 RX ORDER — METHIMAZOLE 10 MG/1
20 TABLET ORAL 3 TIMES DAILY
Status: DISCONTINUED | OUTPATIENT
Start: 2022-08-05 | End: 2022-08-10

## 2022-08-05 RX ORDER — METHIMAZOLE 10 MG/1
20 TABLET ORAL 2 TIMES DAILY
Status: DISCONTINUED | OUTPATIENT
Start: 2022-08-05 | End: 2022-08-05

## 2022-08-05 RX ORDER — POTASSIUM CHLORIDE 14.9 MG/ML
20 INJECTION INTRAVENOUS ONCE
Status: COMPLETED | OUTPATIENT
Start: 2022-08-05 | End: 2022-08-05

## 2022-08-05 RX ORDER — LANOLIN ALCOHOL/MO/W.PET/CERES
400 CREAM (GRAM) TOPICAL 2 TIMES DAILY
Status: DISCONTINUED | OUTPATIENT
Start: 2022-08-05 | End: 2022-09-01 | Stop reason: HOSPADM

## 2022-08-05 RX ADMIN — INSULIN ASPART 5 UNITS: 100 INJECTION, SOLUTION INTRAVENOUS; SUBCUTANEOUS at 05:08

## 2022-08-05 RX ADMIN — CHOLESTYRAMINE 4 G: 4 POWDER, FOR SUSPENSION ORAL at 09:08

## 2022-08-05 RX ADMIN — METHIMAZOLE 20 MG: 10 TABLET ORAL at 09:08

## 2022-08-05 RX ADMIN — CHOLESTYRAMINE 4 G: 4 POWDER, FOR SUSPENSION ORAL at 04:08

## 2022-08-05 RX ADMIN — POTASSIUM BICARBONATE 40 MEQ: 391 TABLET, EFFERVESCENT ORAL at 02:08

## 2022-08-05 RX ADMIN — INSULIN DETEMIR 6 UNITS: 100 INJECTION, SOLUTION SUBCUTANEOUS at 09:08

## 2022-08-05 RX ADMIN — PANTOPRAZOLE SODIUM 40 MG: 40 INJECTION, POWDER, FOR SOLUTION INTRAVENOUS at 09:08

## 2022-08-05 RX ADMIN — DEXMEDETOMIDINE HYDROCHLORIDE 0.6 MCG/KG/HR: 4 INJECTION INTRAVENOUS at 02:08

## 2022-08-05 RX ADMIN — AMIODARONE HYDROCHLORIDE 400 MG: 200 TABLET ORAL at 09:08

## 2022-08-05 RX ADMIN — METRONIDAZOLE 500 MG: 500 TABLET ORAL at 01:08

## 2022-08-05 RX ADMIN — AMIODARONE HYDROCHLORIDE 400 MG: 200 TABLET ORAL at 02:08

## 2022-08-05 RX ADMIN — CHOLESTYRAMINE 4 G: 4 POWDER, FOR SUSPENSION ORAL at 12:08

## 2022-08-05 RX ADMIN — GUAIFENESIN 300 MG: 100 SOLUTION ORAL at 05:08

## 2022-08-05 RX ADMIN — LEVALBUTEROL HYDROCHLORIDE 0.63 MG: 0.63 SOLUTION RESPIRATORY (INHALATION) at 01:08

## 2022-08-05 RX ADMIN — METRONIDAZOLE 500 MG: 500 TABLET ORAL at 05:08

## 2022-08-05 RX ADMIN — LEVALBUTEROL HYDROCHLORIDE 0.63 MG: 0.63 SOLUTION RESPIRATORY (INHALATION) at 07:08

## 2022-08-05 RX ADMIN — MIDODRINE HYDROCHLORIDE 15 MG: 5 TABLET ORAL at 09:08

## 2022-08-05 RX ADMIN — CEFEPIME 2 G: 2 INJECTION, POWDER, FOR SOLUTION INTRAVENOUS at 09:08

## 2022-08-05 RX ADMIN — METHIMAZOLE 20 MG: 10 TABLET ORAL at 04:08

## 2022-08-05 RX ADMIN — Medication 6 MG: at 09:08

## 2022-08-05 RX ADMIN — MEXILETINE HYDROCHLORIDE 400 MG: 200 CAPSULE ORAL at 09:08

## 2022-08-05 RX ADMIN — Medication 400 MG: at 09:08

## 2022-08-05 RX ADMIN — MEXILETINE HYDROCHLORIDE 400 MG: 200 CAPSULE ORAL at 05:08

## 2022-08-05 RX ADMIN — GUAIFENESIN 300 MG: 100 SOLUTION ORAL at 12:08

## 2022-08-05 RX ADMIN — ACETYLCYSTEINE 4 ML: 100 SOLUTION ORAL; RESPIRATORY (INHALATION) at 01:08

## 2022-08-05 RX ADMIN — HEPARIN SODIUM 2300 UNITS/HR: 5000 INJECTION INTRAVENOUS; SUBCUTANEOUS at 04:08

## 2022-08-05 RX ADMIN — HEPARIN SODIUM 2100 UNITS/HR: 5000 INJECTION INTRAVENOUS; SUBCUTANEOUS at 04:08

## 2022-08-05 RX ADMIN — MIDODRINE HYDROCHLORIDE 15 MG: 5 TABLET ORAL at 01:08

## 2022-08-05 RX ADMIN — HYDROMORPHONE HYDROCHLORIDE 0.5 MG: 1 INJECTION, SOLUTION INTRAMUSCULAR; INTRAVENOUS; SUBCUTANEOUS at 04:08

## 2022-08-05 RX ADMIN — DEXMEDETOMIDINE HYDROCHLORIDE 0.6 MCG/KG/HR: 4 INJECTION INTRAVENOUS at 04:08

## 2022-08-05 RX ADMIN — Medication 0.04 MCG/KG/MIN: at 11:08

## 2022-08-05 RX ADMIN — ACETYLCYSTEINE 4 ML: 100 SOLUTION ORAL; RESPIRATORY (INHALATION) at 07:08

## 2022-08-05 RX ADMIN — INSULIN ASPART 5 UNITS: 100 INJECTION, SOLUTION INTRAVENOUS; SUBCUTANEOUS at 02:08

## 2022-08-05 RX ADMIN — POTASSIUM BICARBONATE 40 MEQ: 391 TABLET, EFFERVESCENT ORAL at 09:08

## 2022-08-05 RX ADMIN — Medication 400 MG: at 01:08

## 2022-08-05 RX ADMIN — GUAIFENESIN 300 MG: 100 SOLUTION ORAL at 01:08

## 2022-08-05 RX ADMIN — MEXILETINE HYDROCHLORIDE 400 MG: 200 CAPSULE ORAL at 01:08

## 2022-08-05 RX ADMIN — PREDNISONE 60 MG: 20 TABLET ORAL at 09:08

## 2022-08-05 RX ADMIN — POLYETHYLENE GLYCOL 3350 17 G: 17 POWDER, FOR SOLUTION ORAL at 09:08

## 2022-08-05 RX ADMIN — ASPIRIN 81 MG CHEWABLE TABLET 81 MG: 81 TABLET CHEWABLE at 09:08

## 2022-08-05 RX ADMIN — POTASSIUM CHLORIDE 20 MEQ: 200 INJECTION, SOLUTION INTRAVENOUS at 05:08

## 2022-08-05 RX ADMIN — DEXMEDETOMIDINE HYDROCHLORIDE 0.6 MCG/KG/HR: 4 INJECTION INTRAVENOUS at 09:08

## 2022-08-05 RX ADMIN — METRONIDAZOLE 500 MG: 500 TABLET ORAL at 09:08

## 2022-08-05 RX ADMIN — MIDODRINE HYDROCHLORIDE 15 MG: 5 TABLET ORAL at 10:08

## 2022-08-05 RX ADMIN — HYDROMORPHONE HYDROCHLORIDE 1 MG: 1 INJECTION, SOLUTION INTRAMUSCULAR; INTRAVENOUS; SUBCUTANEOUS at 09:08

## 2022-08-05 NOTE — NURSING
Pt resting in bed, just received IV Dilaudid. VAD parameters WNL. Tracheostomy surgery went well yesterday. VAD Coordinator remains available.

## 2022-08-05 NOTE — PROGRESS NOTES
[facility]  Endocrinology  Progress Note    Admit Date: 6/27/2022     56 year-old  male with NICM with LVAD, s/p ICD for VT on amiodarone and mexiletine and AIT followed by hypothyroidism initially admitted 6/27/2022 with COVID respiratory failure complicated with LVAD pump thrombosis that was refractory to medical therapy. Underwent LVAD pump exchange. Post-op course complicated by worsening cardiogenic shock/RV failure requiring VA-ECMO. Improved clinically underwent successful decannulation. Continued episodes of VT with ICD shock 7/21 and ongoing anti-arrhythmic mediation adjustments. TFTs on 7/27 significant for recurrent hyperthyroidism with undetectable TSH and elevated fT4.    - Patient re-intubated 07/29/2022    - Endocrinology consulted for recurrent AIT    Regarding AIT:    - Last seen outpatient by Dr. 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 He required a prolonged course of prednisone and methimazole, then subsequently developed hypothyroidism in May 2020. LT4 was adjusted based on serial TFT measurements. Most recently levothyroxine dose has been 112 mcg     - He self-decreased his amiodarone dose to 200 mg daily about 6 months ago. T4 was high on 7/13, but he was continued on levothyroxine 112 mcg    Lab Results   Component Value Date    TSH <0.010 (L) 07/27/2022    TSH 0.111 (L) 07/13/2022    TSH 4.079 (H) 03/17/2022    FREET4 2.18 (H) 08/05/2022    FREET4 3.46 (H) 08/04/2022    FREET4 3.03 (H) 08/03/2022       Interval HPI:   Overnight events: No acute events reported overnight  Eating:   NPO  Nausea: No  Hypoglycemia and intervention: No  Fever: No  TPN and/or TF: Yes  If yes, type of TF/TPN and rate: TPN was at 40 ml/hr but appears to be changing to tube feeds    BP (!) 82/0 (BP Location: Right arm, Patient Position: Lying)   Pulse 92   Temp 97.88 °F (36.6 °C)   Resp (!) 25   " Ht 6' 1" (1.854 m)   Wt 93.4 kg (206 lb)   SpO2 99%   BMI 27.18 kg/m²     Labs Reviewed and Include    Recent Labs   Lab 08/05/22 0319   *   CALCIUM 9.9   ALBUMIN 2.3*   PROT 7.5      K 3.9   CO2 21*      BUN 58*   CREATININE 1.5*   ALKPHOS 99   ALT 41   AST 43*   BILITOT 5.3*     Lab Results   Component Value Date    WBC 26.49 (H) 08/05/2022    HGB 7.9 (L) 08/05/2022    HCT 26.4 (L) 08/05/2022    MCV 95 08/05/2022     08/05/2022     Recent Labs   Lab 08/04/22  0337 08/05/22 0319   FREET4 3.46* 2.18*     Lab Results   Component Value Date    HGBA1C 5.4 04/26/2021       Nutritional status:   Body mass index is 27.18 kg/m².  Lab Results   Component Value Date    ALBUMIN 2.3 (L) 08/05/2022    ALBUMIN 2.4 (L) 08/04/2022    ALBUMIN 2.1 (L) 08/04/2022     Lab Results   Component Value Date    PREALBUMIN 28 08/05/2022    PREALBUMIN 13 (L) 07/29/2022    PREALBUMIN 11 (L) 07/17/2022       Estimated Creatinine Clearance: 62.9 mL/min (A) (based on SCr of 1.5 mg/dL (H)).    Accu-Checks  Recent Labs     08/03/22  1612 08/03/22  1909 08/04/22  0048 08/04/22  0334 08/04/22  0815 08/04/22  1356 08/04/22  1801 08/04/22  2119 08/05/22  0223 08/05/22  0542   POCTGLUCOSE 253* 209* 174* 145* 108 249* 193* 175* 155* 125*       Current Medications and/or Treatments Impacting Glycemic Control  Immunotherapy:    Immunosuppressants       None          Steroids:   Hormones (From admission, onward)                Start     Stop Route Frequency Ordered    08/05/22 0900  predniSONE tablet 60 mg         -- PER NG TUBE Daily 08/05/22 0740    08/02/22 1931  melatonin tablet 6 mg         -- OG Nightly PRN 08/02/22 1832    07/15/22 0900  vasopressin (PITRESSIN) 1 Units/mL in dextrose 5 % 100 mL infusion         -- IV Continuous 07/15/22 0900          Pressors:    Autonomic Drugs (From admission, onward)                Start     Stop Route Frequency Ordered    07/29/22 0011  EPINEPHrine (ADRENALIN) 1 mg/mL injection    "     Note to Pharmacy: Created by cabinet override    07/29 1214   07/29/22 0011    07/29/22 0007  EPINEPHrine 5 mg in dextrose 5% 250 mL infusion (premix)        Question Answer Comment   Begin at (in mcg/kg/min): 0.01    Titrate by: (in mcg/kg/min) 0.01    Titrate interval: (in minutes) 10    Titrate to maintain: (SBP or MAP or Cardiac Index) MAP    Greater than: (in mmHg) 65    Maximum dose: (in mcg/kg/min) 2        -- IV Continuous 07/29/22 0008    07/23/22 1400  midodrine tablet 15 mg         -- PER NG TUBE Every 8 hours 07/23/22 0944          Hyperglycemia/Diabetes Medications:   Antihyperglycemics (From admission, onward)                Start     Stop Route Frequency Ordered    08/04/22 1800  insulin aspart U-100 pen 5 Units         -- SubQ Every 4 hours 08/04/22 1710    08/03/22 2100  insulin detemir U-100 pen 6 Units         -- SubQ Nightly 08/03/22 1900    07/30/22 1023  insulin aspart U-100 pen 0-5 Units         -- SubQ Every 4 hours PRN 07/30/22 0925            ASSESSMENT and PLAN    Amiodarone-induced hyperthyroidism  Key History and Diagnostic Findings  - History of AIT type 2 (TRAb and TSI negative; Thyroid US unremarkable with low vascularity)  - Weaned off methimazole and prednisone by May 2020, then developed hypothyroidism, LT4 started and dose titrated per TFTs. Prior to current admission he was on LT4 112 mcg daily  - Labs consistent with hypothyroidism until current admission, initially on 7/13, with low TSH and elevated fT4. Repeat TFTs on 7/27 with worsening hyperthyroidism. He continued to receive LT4 112 mcg through 7/28. He remains on amiodarone for episodes of VT  - Suspect current hyperthyroidism is AIT, however continued exogenous LT4 certainly contribute to current presentation   - Free T4 improved today at 2.2 from 3.46 yesterday. Liver enzymes mildly elevated  Lab Results   Component Value Date    TSH <0.010 (L) 07/27/2022    TSH 0.111 (L) 07/13/2022    TSH 4.079 (H) 03/17/2022     FREET4 2.18 (H) 08/05/2022    FREET4 3.46 (H) 08/04/2022    FREET4 3.03 (H) 08/03/2022     Plan  - Strongly suspect AIT (type 1 or 2); continuing empiric treatment for both  - Continue Methimazole 20 mg TID   - Decrease Prednisone from 80 back to 60 mg daily given improvement in free T4  - Continue Cholestyramine 4g QID  - Continue checking daily free T4  - Follow up free T4 direct dialysis [tells us if free T4 elevation is from heparin; may take a week to result]    amiodarone induced hypothyrodism  - Levothyroxine discontinued on 7/28/2022  - Please see plan as above    Hyperglycemia  Key History and Diagnostic Findings  - Hyperglycemia noted once starting TPN in addition to steroids  - No prior history of diabetes; most recent A1c of 5.4 on 04/26/2021  - Changing from TPN to tube feeds on 08/05/2022, will be on TPN at 40 mL/hr and tube feeds at 10 mL an hour through the day with reported plan to stop TPN and up titrate tube feeds tomorrow    Plan  - Continue levemir 6 units QHS  - Continue aspart 4 units q4h with low correction  - Please notify endocrine if any change in tube feeds as this will change insulin requirements  - Please ensure that accuchecks are being documented q4h per order      Akira Zuñiga DO  [facility], 6th Floor  [address] [place], [address]    Office: [phone]  Fax: [phone]    Disclaimer: This note has been generated using voice-recognition software. There may be typographical errors that have been missed during proof-reading.    The above history labs imaging impression and plan were discussed with attending physician who is in agreement and also took part in this patient's care.  I personally reviewed all of the patients available medications, labs, imaging, vitals, allergies, medical history.

## 2022-08-05 NOTE — CONSULTS
[facility]  Infectious Disease  Consult Note    Patient Name: Tim Richards  MRN: [id number]  Admission Date: 6/27/2022  Hospital Length of Stay: 39 days  Attending Physician: Roslyn Ragsdale DO  Primary Care Provider: Deyanira Booth MD     Isolation Status: No active isolations    Patient information was obtained from patient and ER records.      Inpatient consult to Infectious Diseases  Consult performed by: Harry Gustafson DO  Consult ordered by: Scot Card MD        Assessment/Plan:     Leukocytosis  Fever  Leukocytosis with associated fever up to 100.9 F status post creation of tracheostomy on 8/4/22. Blood cultures this admission have been unrevealing. Only notable pathogen isolated was Klebsiella aerogenes from sputum for which patient has been treated with cefepime. Metronidazole was added for anaerobic coverage. 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 Old DLES inspected at bedside today and found to be dry with no drainage. Newer left sided DLES bandage seen dry, clean, and intact with no drainage reported by nursing. DLES cultures performed on 7/25 showed no growth. CT c/a/p 7/29 with no evidence of infection. 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 First dose 80 mg now tapered down to 60 mg.     Recommendations  --Continue IV cefepime and metronidazole for originally anticipated 14 day pneumonia course; stop date 8/9/22   --If patient spikes another fever please obtain two sets of repeat blood cultures  --Fever likely due to tracheostomy procedure on 8/4   --Leukocytosis likely attributed to high dose steroids   --If patient will be on glucocorticoid dose equivalent to ?20 mg of prednisone daily for one month or longer then would recommend starting PJP prophylaxis with PO TMP//80 mg daily or renally dosed by pharmacy       ID will sign off. Please contact us if new  "concerns arise.     Thank you for your consult. I will sign off. Please contact us if you have any additional questions.    Harry Gustafson, DO  Infectious Disease  [facility]    Subjective:     Principal Problem: Left ventricular assist device (LVAD) complication    HPI: A 56-year-old man with HFrEF-10%, s/p VAD HM2 (March 2018), ICD, VT on amiodarone who developed malaise, anorexia, SOB, dark urine, and soft stools 3-4 days prior to admission. He was evaluated in [facility] ED at which time he tested positive for Covid-19 infection. He was admitted for further management and started on Remdesivir treatment protocol. Since admission, he feels significantly improved with bowel movements returning to normal and the shortness of breath subsiding.     Infectious Diseases consulted for "covid infection, acute. Patient with LVAD"    ID reconsulted 7/21 for "sepsis and consideration for fungemia". Since pt was last seen by ID, patient underwent AV repair, redo sternotomy, explant of old lvad HM2 with implantation of HM3, and placed on VA-ECMO, takeback 7/14 for mediastinal washout/hematoma, and washout, sternal closure, creation of moises-pericardium (gerotex membrane) and wound vac placement on 7/15. Decannulated ECMO on 7/19 with subsequent fever and hypotension. Pt was placed on broad spectrum abx. Blcx obtained from 7/20 ngtd. S/p bronch on 7/20 - cx with normal respiratory sachin.     ID reconsulted 8/5 for "On cefepime and flagyl day 10 for klebsiella. Spiking fever. Has LVAD. WBC trending up (also on steroid though). Tip culture from [facility] in process."        Past Medical History:   Diagnosis Date    Anticoagulant long-term use     CHF (congestive heart failure)     Class 1 obesity due to excess calories with serious comorbidity and body mass index (BMI) of 31.0 to 31.9 in adult     Dilated cardiomyopathy 1/10/2018    Disorder of kidney and ureter     CKD    Encounter for blood transfusion     Gout "     HTN (hypertension)     Hx of psychiatric care     ICD (implantable cardioverter-defibrillator) infection 7/1/2020    Psychiatric problem     Thyroid disease     Ventricular tachycardia (paroxysmal)        Past Surgical History:   Procedure Laterality Date    AORTIC VALVULOPLASTY N/A 7/13/2022    Procedure: REPAIR, AORTIC VALVE;  Surgeon: Yg Kaufman MD;  Location: [facility] OR 2ND FLR;  Service: Cardiovascular;  Laterality: N/A;    APPLICATION OF WOUND VACUUM-ASSISTED CLOSURE DEVICE N/A 7/15/2022    Procedure: APPLICATION, WOUND VAC;  Surgeon: Yg Kaufman MD;  Location: [facility] OR 2ND FLR;  Service: Cardiovascular;  Laterality: N/A;  50 x 5 cm     CARDIAC CATHETERIZATION  Dec. 2012    CARDIAC DEFIBRILLATOR PLACEMENT Left     CRRT-D    COLONOSCOPY N/A 3/6/2018    Procedure: COLONOSCOPY;  Surgeon: Alonso Bone MD;  Location: [facility] ENDO (2ND FLR);  Service: Endoscopy;  Laterality: N/A;    COLONOSCOPY N/A 7/17/2019    Procedure: COLONOSCOPY;  Surgeon: Blane Valdez MD;  Location: [facility] ENDO (2ND FLR);  Service: Endoscopy;  Laterality: N/A;    COLONOSCOPY N/A 7/18/2019    Procedure: COLONOSCOPY;  Surgeon: Blane Valdez MD;  Location: [facility] ENDO (2ND FLR);  Service: Endoscopy;  Laterality: N/A;    ESOPHAGOGASTRODUODENOSCOPY N/A 7/17/2019    Procedure: EGD (ESOPHAGOGASTRODUODENOSCOPY);  Surgeon: Blane Valdez MD;  Location: [facility] ENDO (2ND FLR);  Service: Endoscopy;  Laterality: N/A;    ESOPHAGOGASTRODUODENOSCOPY N/A 7/18/2019    Procedure: EGD (ESOPHAGOGASTRODUODENOSCOPY);  Surgeon: Blane Valdez MD;  Location: [facility] ENDO (2ND FLR);  Service: Endoscopy;  Laterality: N/A;    FOREIGN BODY REMOVAL N/A 7/22/2022    Procedure: REMOVAL, FOREIGN BODY;  Surgeon: Yg Kaufman MD;  [facility];  Service: Cardiovascular;  Laterality: N/A;  LVAD Heartmate 2 drive line removal    INSERTION OF GRAFT TO PERICARDIUM N/A 7/15/2022    Procedure: INSERTION, GRAFT, PERICARDIUM;  Surgeon: Yg Kaufman MD;  Location: [facility]  OR 2ND FLR;  Service: Cardiovascular;  Laterality: N/A;    IRRIGATION OF MEDIASTINUM N/A 7/15/2022    Procedure: IRRIGATION, MEDIASTINUM;  Surgeon: Yg Kaufman MD;  Location: [facility] OR [facility] FLR;  Service: Cardiovascular;  Laterality: N/A;    LYSIS OF ADHESIONS  7/13/2022    Procedure: LYSIS, ADHESIONS;  Surgeon: Yg Kaufman MD;  [facility];  Service: Cardiovascular;;    NONINVASIVE CARDIAC ELECTROPHYSIOLOGY STUDY N/A 10/18/2019    Procedure: CARDIAC ELECTROPHYSIOLOGY STUDY, NONINVASIVE;  Surgeon: Raz Wagner MD;  Location: [facility] EP LAB;  Service: Cardiology;  Laterality: N/A;  VT, DFTs, MDT CRTD in situ, LVAD, juarez MB, 3098    REPLACEMENT OF IMPLANTABLE CARDIOVERTER-DEFIBRILLATOR (ICD) GENERATOR N/A 3/9/2020    Procedure: REPLACEMENT, ICD GENERATOR;  Surgeon: Harry Yun MD;  Location: [facility] EP LAB;  Service: Cardiology;  Laterality: N/A;  VT, ICD Gen Change and Lead Revision, MDT, MAC, DM,3 Prep    REPLACEMENT OF LEFT VENTRICULAR ASSIST DEVICE (LVAD)  7/13/2022    Procedure: REPLACEMENT, LVAD;  Surgeon: Yg Kaufman MD;  [facility];  Service: Cardiovascular;;    REPLACEMENT OF PUMP N/A 7/13/2022    Procedure: REPLACEMENT, PUMP;  Surgeon: Yg Kaufman MD;  Location: [facility] OR [address] [place] [address];  Service: Cardiovascular;  Laterality: N/A;  LVAD pump exchange  EXPLANATION OF HEATMATE 2  IMPLANTATION OF HEARTMATE 3  IMPLANTATION OF 8MM CHIMNEY GRAFT TO RFA  INITIATION OF ECMO  TEMPORARY CLOSURE OF CHEST    REVISION OF IMPLANTABLE CARDIOVERTER-DEFIBRILLATOR (ICD) ELECTRODE LEAD PLACEMENT N/A 3/9/2020    Procedure: REVISION, INSERTION, ELECTRODE LEAD, ICD;  Surgeon: Harry Yun MD;  Location: [facility] EP LAB;  Service: Cardiology;  Laterality: N/A;  VT, ICD Gen Change and Lead Revision, MDT, MAC, DM,3 Prep    STERNAL WOUND CLOSURE N/A 7/14/2022    Procedure: CLOSURE, WOUND, STERNUM;  Surgeon: Yg Kaufman MD;  [facility];  Service: Cardiovascular;   Laterality: N/A;  temporary closure  evacuation of hematoma    STERNAL WOUND CLOSURE N/A 7/15/2022    Procedure: CLOSURE, WOUND, STERNUM;  Surgeon: Yg Kaufman MD;  [facility];  Service: Cardiovascular;  Laterality: N/A;    TRACHEOSTOMY N/A 8/4/2022    Procedure: CREATION, TRACHEOSTOMY;  Surgeon: Germain Holt MD;  [facility];  Service: General;  Laterality: N/A;    TREATMENT OF CARDIAC ARRHYTHMIA  10/18/2019    Procedure: Cardioversion or Defibrillation;  Surgeon: Raz Wagner MD;  Location: [facility] EP LAB;  Service: Cardiology;;       Review of patient's allergies indicates:   Allergen Reactions    Lisinopril Anaphylaxis    Hydralazine analogues      Chronic constipation, impotence, dizziness       Medications:  Medications Prior to Admission   Medication Sig    allopurinoL (ZYLOPRIM) 100 MG tablet TAKE 1 TABLET (100 MG) BY MOUTH NIGHTLY.    amiodarone (PACERONE) 200 MG Tab Take 2 tablets (400 mg total) by mouth once daily.    amLODIPine (NORVASC) 10 MG tablet TAKE 1 TABLET BY MOUTH EVERY DAY    aspirin (ECOTRIN) 81 MG EC tablet Take 1 tablet (81 mg total) by mouth once daily.    busPIRone (BUSPAR) 5 MG Tab Take 1 tablet (5 mg total) by mouth 3 (three) times daily.    levothyroxine (SYNTHROID) 112 MCG tablet Take 1 tablet (112 mcg total) by mouth before breakfast.    mexiletine (MEXITIL) 250 MG Cap Take 1 capsule (250 mg total) by mouth every 8 (eight) hours.    pantoprazole (PROTONIX) 40 MG tablet TAKE 1 TABLET (40 MG TOTAL) BY MOUTH DAILY WITH LUNCH.    warfarin (COUMADIN) 5 MG tablet TAKE 7.5 MG ORALLY DAILY EVERY SUNDAY AND THURSDAY, TAKE 5 MG ORALLY DAILY ON OTHER DAYS    [DISCONTINUED] ALPRAZolam (XANAX) 1 MG tablet Take 1 tablet (1 mg total) by mouth daily as needed for Anxiety. Bid prn    [DISCONTINUED] zolpidem (AMBIEN CR) 12.5 MG CR tablet Take 1 tablet (12.5 mg total) by mouth nightly as needed for Insomnia.     Antibiotics (From admission,  onward)                Start     Stop Route Frequency Ordered    22 1030  metroNIDAZOLE tablet 500 mg         -- Oral Every 8 hours 22 1020    22  cefepime in dextrose 5 % IVPB 2 g         -- IV Every 12 hours (non-standard times) 22 1007    22  mupirocin 2 % ointment         2059 Nasl 2 times daily 22 2100  mupirocin 2 % ointment 1 g         2059 Nasl 2 times daily 22 1552    22  mupirocin 2 % ointment         2059 Nasl 2 times daily 22 1810          Antifungals (From admission, onward)                None          Antivirals (From admission, onward)      None             Immunization History   Administered Date(s) Administered    COVID-19, MRNA, LN-S, PF (MODERNA FULL 0.5 ML DOSE) 2021, 2021    COVID-19, MRNA, LN-S, PF (Pfizer) (Purple Cap) 2021    Hepatitis A / Hepatitis B 2018    Influenza 2014, 2019    Influenza - Quadrivalent - PF *Preferred* (6 months and older) 2015, 2016, 10/05/2017, 2021    Influenza - Trivalent - PF (PED) 2014    Pneumococcal Conjugate - 13 Valent 2014    Pneumococcal Polysaccharide - 23 Valent 10/01/2015, 2016    Tdap 2018       Family History       Problem Relation (Age of Onset)    Cancer Sister (54)    Coronary artery disease Father    Diabetes Father    Heart disease Father    Hypertension Father    No Known Problems Mother, Brother          Social History     Socioeconomic History    Marital status:     Number of children: 3   Occupational History     Employer: [employer]    Tobacco Use    Smoking status: Former Smoker     Packs/day: 1.00     Years: 31.00     Pack years: 31.00     Types: Cigarettes     Quit date: 2018     Years since quittin.5    Smokeless tobacco: Never Used    Tobacco comment: pt is quiting on his own - pt stated not qualified for program;  pt   quit on his own   Substance and Sexual Activity    Alcohol use: No     Alcohol/week: 0.0 standard drinks     Comment: quit    Drug use: No    Sexual activity: Yes     Partners: Female     Birth control/protection: None     Comment: 10/5/17  with same partner 7 years    Social History Narrative    Disabled     Review of Systems   Constitutional:  Negative for chills, fatigue, fever and unexpected weight change.   HENT:  Positive for congestion (Minimal secretions per patient). Negative for postnasal drip and trouble swallowing.    Respiratory:  Negative for cough, chest tightness and shortness of breath.    Cardiovascular:  Negative for chest pain, palpitations and leg swelling.   Gastrointestinal:  Negative for abdominal pain, blood in stool, constipation, diarrhea, nausea and vomiting.   Genitourinary:  Negative for difficulty urinating, dysuria and hematuria.   Musculoskeletal:  Negative for arthralgias and back pain.   Neurological:  Negative for dizziness and light-headedness.   Psychiatric/Behavioral:  Negative for confusion.    Objective:     Vital Signs (Most Recent):  Temp: 98.78 °F (37.1 °C) (08/05/22 1315)  Pulse: 94 (08/05/22 1315)  Resp: 15 (08/05/22 1315)  BP: (!) 78/0 (08/05/22 1100)  SpO2: 99 % (08/05/22 0318)   Vital Signs (24h Range):  Temp:  [97.88 °F (36.6 °C)-100.94 °F (38.3 °C)] 98.78 °F (37.1 °C)  Pulse:  [] 94  Resp:  [5-54] 15  SpO2:  [99 %] 99 %  BP: (78-88)/(0) 78/0  Arterial Line BP: (75-96)/(58-86) 75/66     Weight: 93.4 kg (206 lb)  Body mass index is 27.18 kg/m².    Estimated Creatinine Clearance: 72.6 mL/min (based on SCr of 1.3 mg/dL).    Physical Exam  Constitutional:       General: He is not in acute distress.     Appearance: He is ill-appearing.   Cardiovascular:      Rate and Rhythm: Normal rate and regular rhythm.      Pulses: Normal pulses.      Heart sounds: Normal heart sounds. No murmur heard.    No friction rub. No gallop.   Pulmonary:      Effort: Pulmonary  effort is normal. No respiratory distress.      Breath sounds: Normal breath sounds.      Comments: Tracheostomy intact with no notable secretions   Abdominal:      General: Abdomen is flat. Bowel sounds are normal. There is no distension.      Palpations: Abdomen is soft.      Tenderness: There is no abdominal tenderness.      Comments: Old right sided DLES dry on inspection  Left sided DLES bandage clean, dry, and intact    Skin:     General: Skin is warm and dry.      Comments: Midline chest incision healing    Neurological:      Mental Status: He is alert.       Significant Labs: All pertinent labs within the past 24 hours have been reviewed.    Significant Imaging: I have reviewed all pertinent imaging results/findings within the past 24 hours.

## 2022-08-05 NOTE — PROGRESS NOTES
[facility]  Heart Failure  Progress Note     Hospital Length of Stay: 39    Interval History:  - No significant events overnight.   - Calvin a temp in last 24 hour.   - No significant event in LVAD recorded.     Hemodynamics:   On epinephrine of 0.04, vasopressin 4 and lasix 5 mg/hr.   SvO2 of 62, CV 11  CO 8.2, CI 3.8 and .     Vitals:  Temp:  [97.88 °F (36.6 °C)-100.94 °F (38.3 °C)] 98.24 °F (36.8 °C)  Pulse:  [] 89  Resp:  [5-54] 19  SpO2:  [99 %] 99 %  BP: (78-88)/(0) 78/0  Arterial Line BP: (75-96)/(62-86) 80/69    Intake/Output    Intake/Output Summary (Last 24 hours) at 8/5/2022 1221  Last data filed at 8/5/2022 1200  Gross per 24 hour   Intake 2311.65 ml   Output 5805 ml   Net -3493.35 ml       Physical Exam  Gen: Trach in place, follows  command.   HEENT: Pupils equal and reactive to light  Cardio: Regular rate, point of maximal impulse not displaced,1+ radial pulses bilaterally, 1+ DP pulses bilaterally, VAD hum obstructing heart sounds  Resp: No additional sound heard.   Abd: Soft, non-tender, non-distended  Skin: Warm,  trace peripheral edema  Neuro: No gross abnormalities.   Psych: unable to assess     Labs:  Recent Labs   Lab 08/04/22  1355 08/04/22 1943 08/05/22 0319   WBC 26.11* 24.23* 26.49*   HGB 7.6* 7.8* 7.9*   HCT 25.6* 26.4* 26.4*    368 374     Recent Labs   Lab 08/04/22  1355 08/04/22 1943 08/05/22 0319    141 141   K 5.1 5.1 3.9    110 109   CO2 21* 21* 21*   BUN 61* 61* 58*   CREATININE 1.6* 1.6* 1.5*   * 184* 143*   CALCIUM 9.7 9.4 9.9   MG 2.6 2.7* 2.4   PHOS 4.2 3.5 3.1       Micro:    Current Meds:   acetylcysteine 100 mg/ml (10%)  4 mL Nebulization Q6H    amiodarone  400 mg Oral TID    aspirin  81 mg Per OG tube Daily    ceFEPime (MAXIPIME) IVPB  2 g Intravenous Q12H    cholestyramine  1 packet Per NG tube QID    guaiFENesin 100 mg/5 ml  300 mg Per NG tube Q6H    insulin aspart U-100  5 Units Subcutaneous 6 times per  day    insulin detemir U-100  6 Units Subcutaneous QHS    levalbuterol  0.63 mg Nebulization Q6H    methIMAzole  20 mg Per NG tube BID    metroNIDAZOLE  500 mg Oral Q8H    mexiletine  400 mg Oral Q8H    midodrine  15 mg Per NG tube Q8H    pantoprazole  40 mg Intravenous BID    polyethylene glycol  17 g Per NG tube BID    predniSONE  60 mg Per NG tube Daily       Continuous Infusions:   dexmedetomidine (PRECEDEX) infusion 0.6 mcg/kg/hr (08/05/22 1200)    dextrose 10 % in water (D10W)      EPINEPHrine 0.04 mcg/kg/min (08/05/22 1200)    furosemide (LASIX) 2 mg/mL continuous infusion (non-titrating) 2.5 mg/hr (08/05/22 1200)    heparin (porcine) in 5 % dex 2,300 Units/hr (08/05/22 1200)    nicardipine 1 mg/hr (08/05/22 1200)    vasopressin 0.03 Units/min (08/05/22 1200)     Assessment and Plan:  Cardiogenic Shock  -Previous HM2, underwent pump exchange to HM3 on 7/13/22 complicated by worsening CS/RV failure requiring VA ECMO now decannulated  -Re-intubated on 7/29/22 due to hypercapnic resp failure  -Was transiently off epi and vasopressin, however, had to be placed back on it. Will titrate it down. Also on nicardipine drip.      LVAD  TXP LVAD INTERROGATIONS 7/31/2022 7/31/2022 7/31/2022 7/31/2022 7/31/2022 7/31/2022 7/31/2022   Type HeartMate3 HeartMate3 HeartMate3 HeartMate3 HeartMate3 HeartMate3 HeartMate3   Flow 5.3 5.5 5.3 5.3 5.3 5.4 5.5   Speed 6200 6200 6200 6200 6200 6200 6200   PI 3.2 3.3 3.3 3.3 3.1 3.2 3.1   Power (Jackson) 5.2 5.1 5.1 5.2 5.1 5.1 5.1   LSL 5800 - - - 5800 - -   Low Flow Alarm - - - - - - -   Pulsatility Intermittent pulse - - - Intermittent pulse - -     Fever:   Also, has leucocytosis, although on steroid also.   Have discussed with ID team. On board.   Pending recommendations.   Currently on day 10 of cefepime and flagyl.      History of ventricular tachycardia/Episode of A flutter 2:1 block  Patient experienced an episode of VT on 6/30 and experienced an ICD shock thought  to be related to hypokalemia (K of 3.1 on 6/30)  - Aggressively replete K, keep K>4 and Mg>2  - Continue mexiletine PO.  - Amio gtt transitioned to PO.     COVID-19 virus infection  -Previously hypoxic then recovered  -ID was consulted, recommended Remdesivir, course completed     Elevated serum lactate dehydrogenase (LDH)  S/p HM3 exchange for HM2 pump thrombosis  Continue to trend LDH.      amiodarone induced hypothyrodism initially, now hyperthyroidism  -Endocrine team on board.  -On prednisone and methimazole. Prednisone decreased to 60 mg.   -T4 trending down. Medications has been changes as per Endocrinology team.      Chronic combined systolic and diastolic heart failure  ADHF  Underwent HM III upgrade on 7/13/22, currently on VA ECMO  Currently on Epi gtt, Vasopressin  Currently on Precedex  gtt for sedation  Being treated for sepsis.   On cefepime and flagyl.  Currently trach ed. Trach placed on 8/4. (reintubarted 7/29)  Chest tube removal, bronch, and old driveline removed 7/22      Scot Card MD, FACP  Cardiovascular Disease Fellow PGY4  [facility]

## 2022-08-05 NOTE — PT/OT/SLP PROGRESS
Physical Therapy  Co-Treatment with OT    Patient Name:  Tim Richards   MRN:  [id number]    Recommendations:     Discharge Recommendations:  rehabilitation facility   Discharge Equipment Recommendations:  (will determine DME needs closer to discharge.)   Barriers to discharge: Decreased caregiver support family will not be able to assist at current functional level.     Assessment:     Tim Richards is a 56 y.o. male admitted with a medical diagnosis of Left ventricular assist device (LVAD) complication.  He presents with the following impairments/functional limitations:  weakness, impaired endurance, impaired functional mobility, gait instability, impaired balance, decreased safety awareness, decreased lower extremity function, decreased coordination pt tolerated treatment well and will benefit from cont skilled PT 5x/wk to progress physically. Pt will need inpt rehab when medically stable to maximize rehab potential. Pt was evaluated and discharged 6/29 and 7/6 due to being Independent. Pt had LVAD pump exchange 7/22/22. Pt is s/p trach placement 8/4/22.    Rehab Prognosis: Good; patient would benefit from acute skilled PT services to address these deficits and reach maximum level of function.    Recent Surgery: Procedure(s) (LRB):  CREATION, TRACHEOSTOMY (N/A)  INSERTION, CENTRAL VENOUS ACCESS DEVICE 1 Day Post-Op    Plan:     During this hospitalization, patient to be seen 5 x/week to address the identified rehab impairments via gait training, therapeutic activities, therapeutic exercises, neuromuscular re-education and progress toward the following goals:    · Plan of Care Expires:  08/20/22    Subjective     Chief Complaint: pt c/o pain during treatment and being tired after treatment.   Patient/Family Comments/goals: to get better and go home.   Pain/Comfort:  · Pain Rating 1: 5/10 (throat)  · Pain Addressed 1: Distraction  · Pain Rating Post-Intervention 1: 5/10 (neck)      Objective:  "    Communicated with nurse prior to session.  Patient found supine with telemetry, arterial line, SCD, ventilator, Tracheostomy, PICC line, sun catheter, wound vac, LVAD (SCD, CVP) upon PT entry to room.     General Precautions: Standard, fall, sternal   Orthopedic Precautions:N/A   Braces:    Respiratory Status: trach to vent Respiratory therapist approved treatment on current vent settings.      Functional Mobility:  · Bed Mobility: pt needed verbal cues for functional mobility with sternal precautions.     · Rolling Right: maximal assistance and of 2 persons  · Supine to Sit: maximal assistance and of 2 persons  · Sit to Supine: maximal assistance and of 2 persons  ·   · Balance: pt sat on EOB x 10 min with CGA for static sitting balance with verbal cues to shift trunk to R.  pt was max assist dynamic sitting balance leaning to L side.     Due to pt complex medical condition, the skill of 2 licensed therapists is needed to maximize treatment session and progression towards goals    AM-PAC 6 CLICK MOBILITY  Turning over in bed (including adjusting bedclothes, sheets and blankets)?: 2  Sitting down on and standing up from a chair with arms (e.g., wheelchair, bedside commode, etc.): 2  Moving from lying on back to sitting on the side of the bed?: 2  Moving to and from a bed to a chair (including a wheelchair)?: 2  Need to walk in hospital room?: 1  Climbing 3-5 steps with a railing?: 1  Basic Mobility Total Score: 10       Therapeutic Activities and Exercises:   pt performed AAROM there exer BLE in sitting for toe raises, heel raises, and LAQ x 10 reps. Pt performed AAROM there exer BLE in supine for heel slides and hip abduction slides x 10 reps.     Pt received verbal instructions in PT POC and nodded "yes" that he understood.     Patient left supine with all lines intact, call button in reach and RN notified.    GOALS:   Multidisciplinary Problems     Physical Therapy Goals        Problem: Physical Therapy    " Goal Priority Disciplines Outcome Goal Variances Interventions   Physical Therapy Goal     PT, PT/OT Ongoing, Progressing     Description: Goals to be met by: 22    Patient will increase functional independence with mobility by performin. Supine to sit with MInimal Assistance -not met  2. Sit to stand transfer with Contact Guard Assistance -not met  3. Gait  x 150 feet with Contact Guard Assistance with approved assistive device -not met  4. Ascend/descend 8 stair with right Handrails Contact Guard Assistance -not met  5. Sitting at edge of bed x15 minutes with Contact Guard Assistance to improve tolerance to activities. -not met  6. Lower extremity exercise program x15 reps with assistance as needed -not met               Multidisciplinary Problems (Resolved)        Problem: Physical Therapy    Goal Priority Disciplines Outcome Goal Variances Interventions   Physical Therapy Goal   (Resolved)     PT, PT/OT Met     Description: The patient is near his functional baseline, he ambulated within the room with no AD and independence. Unable to do stair training, assess gait endurance due to COVID restrictions. He is safe to return home with no therapy needs. He is in agreement with PT discharge, he states he is confident he can ascend/descend his stairs.           Problem: Physical Therapy    Goal Priority Disciplines Outcome Goal Variances Interventions   Physical Therapy Goal   (Resolved)     PT, PT/OT Met                     Time Tracking:     PT Received On: 22  PT Start Time: 1232     PT Stop Time: 1259  PT Total Time (min): 27 min     Billable Minutes: Therapeutic Activity 15 min and Therapeutic Exercise 12 min    Treatment Type: Treatment  PT/PTA: PT     PTA Visit Number: 0     2022

## 2022-08-05 NOTE — ASSESSMENT & PLAN NOTE
Key History and Diagnostic Findings  - History of AIT type 2 (TRAb and TSI negative; Thyroid US unremarkable with low vascularity)  - Weaned off methimazole and prednisone by May 2020, then developed hypothyroidism, LT4 started and dose titrated per TFTs. Prior to current admission he was on LT4 112 mcg daily  - Labs consistent with hypothyroidism until current admission, initially on 7/13, with low TSH and elevated fT4. Repeat TFTs on 7/27 with worsening hyperthyroidism. He continued to receive LT4 112 mcg through 7/28. He remains on amiodarone for episodes of VT  - Suspect current hyperthyroidism is AIT, however continued exogenous LT4 certainly contribute to current presentation   - Free T4 improved today at 2.2 from 3.46 yesterday. Liver enzymes mildly elevated  Lab Results   Component Value Date    TSH <0.010 (L) 07/27/2022    TSH 0.111 (L) 07/13/2022    TSH 4.079 (H) 03/17/2022    FREET4 2.18 (H) 08/05/2022    FREET4 3.46 (H) 08/04/2022    FREET4 3.03 (H) 08/03/2022     Plan  - Strongly suspect AIT (type 1 or 2); continuing empiric treatment for both  - Decrease Methimazole from 20 mg TID back to BID given mild elevation in liver enzymes and improvement in free T4  - Decrease Prednisone from 80 back to 60 mg daily given improvement in free T4  - Continue Cholestyramine 4g QID  - Continue checking daily free T4

## 2022-08-05 NOTE — PROGRESS NOTES
Update    Pt presents as AAO x4, calm, cooperative, caregivers not at bedside. Pt recently received Trach and is whispering, Pt states he is doing okay. LCSW offered support and encouragement and held space for Pt. SW providing ongoing psychosocial, counseling, & emotional support, education, resources, assistance, and discharge planning as indicated.  SW to continue to follow.

## 2022-08-05 NOTE — PROGRESS NOTES
[facility]  General Surgery  Progress Note    Subjective:     History of Present Illness:  Patient is a 56 y.o. male with history of HFrEF with an EF of 10%, he had an HM2 placed in 2018 now s/p recent surgery for HM3 upgrade complicated by need for intra-operative VA ECMO after failure to wean off bypass. General Surgery was consulted for tracheostomy creation. Patient was decannulated on 7/19. He has remained intubated and has been unable to be weaned off the vent. Currently with vent settings at 50/5. He is currently on nitric at 10 ppm. Patient with recent GNR from BAL on 7/20/22. Blood cultures with no growth to date. He has been having intermittent fevers - Tmax today was 101.5F. Patient has also had increased hypotension and is on Giapreza 30, epi 0.06, levo 0.04, and vaso 0.04. He is currently being treated with meropenem, vanc, and micafungin.      Post-Op Info:  Procedure(s) (LRB):  CREATION, TRACHEOSTOMY (N/A)  INSERTION, CENTRAL VENOUS ACCESS DEVICE   1 Day Post-Op     Interval History: Ventilating appropriately via trach without difficulty. On minimal vent settings this am.      Medications:  Continuous Infusions:   dexmedetomidine (PRECEDEX) infusion 0.6 mcg/kg/hr (08/05/22 0919)    dextrose 10 % in water (D10W)      EPINEPHrine 0.04 mcg/kg/min (08/05/22 0900)    furosemide (LASIX) 2 mg/mL continuous infusion (non-titrating) 2.5 mg/hr (08/05/22 0900)    heparin (porcine) in 5 % dex 2,300 Units/hr (08/05/22 0900)    nicardipine 2 mg/hr (08/05/22 0900)    vasopressin 0.04 Units/min (08/05/22 0900)     Scheduled Meds:   acetylcysteine 100 mg/ml (10%)  4 mL Nebulization Q6H    amiodarone  400 mg Oral TID    aspirin  81 mg Per OG tube Daily    ceFEPime (MAXIPIME) IVPB  2 g Intravenous Q12H    cholestyramine  1 packet Per NG tube QID    guaiFENesin 100 mg/5 ml  300 mg Per NG tube Q6H    insulin aspart U-100  5 Units Subcutaneous 6 times per day    insulin detemir U-100   6 Units Subcutaneous QHS    levalbuterol  0.63 mg Nebulization Q6H    methIMAzole  20 mg Per NG tube BID    metroNIDAZOLE  500 mg Oral Q8H    mexiletine  400 mg Oral Q8H    midodrine  15 mg Per NG tube Q8H    pantoprazole  40 mg Intravenous BID    polyethylene glycol  17 g Per NG tube BID    predniSONE  60 mg Per NG tube Daily     PRN Meds:(Magic mouthwash) 1:1:1 diphenhydramine(Benadryl) 12.5mg/5ml liq, aluminum & magnesium hydroxide-simethicone (Maalox), LIDOcaine viscous 2%, sodium chloride 0.9%, sodium chloride 0.9%, acetaminophen, artificial tears, bisacodyL, dextrose 10 % in water (D10W), dextrose 10%, dextrose 10%, glucagon (human recombinant), HYDROmorphone, HYDROmorphone, hydrOXYzine HCL, insulin aspart U-100, melatonin     Review of patient's allergies indicates:   Allergen Reactions    Lisinopril Anaphylaxis    Hydralazine analogues      Chronic constipation, impotence, dizziness     Objective:     Vital Signs (Most Recent):  Temp: 98.06 °F (36.7 °C) (08/05/22 0943)  Pulse: 95 (08/05/22 0943)  Resp: (!) 24 (08/05/22 0943)  BP: (!) 82/0 (08/05/22 0700)  SpO2: 99 % (08/05/22 0318)   Vital Signs (24h Range):  Temp:  [97.7 °F (36.5 °C)-100.94 °F (38.3 °C)] 98.06 °F (36.7 °C)  Pulse:  [] 95  Resp:  [5-54] 24  SpO2:  [99 %] 99 %  BP: (82-88)/(0) 82/0  Arterial Line BP: (79-96)/(62-86) 81/70     Weight: 93.4 kg (206 lb)  Body mass index is 27.18 kg/m².    Intake/Output - Last 3 Shifts         08/03 0700  08/04 0659 08/04 0700 08/05 0659 08/05 0700 08/06 0659    I.V. (mL/kg) 1243.1 (13.6) 1073.1 (11.5) 196.6 (2.1)    NG/  210    IV Piggyback 543.3 485 78.3    TPN 1178.2 491     Total Intake(mL/kg) 3134.6 (34.2) 2049 (21.9) 485 (5.2)    Urine (mL/kg/hr) 4510 (2.1) 4934 (2.2) 655 (2.5)    Other 50 100 0    Stool 0 0     Total Output 4560 5034 655    Net -1425.4 -2985 -170           Stool Occurrence 0 x 0 x             Physical Exam  Constitutional:       General: He is not in acute  distress.  HENT:      Head: Normocephalic.   Eyes:      Extraocular Movements: Extraocular movements intact.   Neck:      Comments: Tracheostomy tube in place. No stigmata of active bleeding appreciated.   Cardiovascular:      Rate and Rhythm: Normal rate.   Pulmonary:      Effort: Pulmonary effort is normal.      Comments: Mechanically ventilated.   Neurological:      Mental Status: He is alert.       Significant Labs:  I have reviewed all pertinent lab results within the past 24 hours.    Significant Diagnostics:  I have reviewed all pertinent imaging results/findings within the past 24 hours.    Assessment/Plan:     * Left ventricular assist device (LVAD) complication  Tim Richards is a 56 y.o. male who underwent heartmate 2 to heartmate 3 upgrade. Post operatively he had difficulty weaning from the vent, was briefly extubated, but had to be re intubated due to respiratory distress. He is now s/p percutaneous trach placement on 8/4/22.    -Successful placement of tracheostomy  -Vent management per primary      General surgery to sign off. Please do not hesitate to call with any questions.      Hardeep Biswas MD  General Surgery  [facility]

## 2022-08-05 NOTE — SUBJECTIVE & OBJECTIVE
Interval History: Ventilating appropriately via trach without difficulty. On minimal vent settings this am.      Medications:  Continuous Infusions:   dexmedetomidine (PRECEDEX) infusion 0.6 mcg/kg/hr (08/05/22 0919)    dextrose 10 % in water (D10W)      EPINEPHrine 0.04 mcg/kg/min (08/05/22 0900)    furosemide (LASIX) 2 mg/mL continuous infusion (non-titrating) 2.5 mg/hr (08/05/22 0900)    heparin (porcine) in 5 % dex 2,300 Units/hr (08/05/22 0900)    nicardipine 2 mg/hr (08/05/22 0900)    vasopressin 0.04 Units/min (08/05/22 0900)     Scheduled Meds:   acetylcysteine 100 mg/ml (10%)  4 mL Nebulization Q6H    amiodarone  400 mg Oral TID    aspirin  81 mg Per OG tube Daily    ceFEPime (MAXIPIME) IVPB  2 g Intravenous Q12H    cholestyramine  1 packet Per NG tube QID    guaiFENesin 100 mg/5 ml  300 mg Per NG tube Q6H    insulin aspart U-100  5 Units Subcutaneous 6 times per day    insulin detemir U-100  6 Units Subcutaneous QHS    levalbuterol  0.63 mg Nebulization Q6H    methIMAzole  20 mg Per NG tube BID    metroNIDAZOLE  500 mg Oral Q8H    mexiletine  400 mg Oral Q8H    midodrine  15 mg Per NG tube Q8H    pantoprazole  40 mg Intravenous BID    polyethylene glycol  17 g Per NG tube BID    predniSONE  60 mg Per NG tube Daily     PRN Meds:(Magic mouthwash) 1:1:1 diphenhydramine(Benadryl) 12.5mg/5ml liq, aluminum & magnesium hydroxide-simethicone (Maalox), LIDOcaine viscous 2%, sodium chloride 0.9%, sodium chloride 0.9%, acetaminophen, artificial tears, bisacodyL, dextrose 10 % in water (D10W), dextrose 10%, dextrose 10%, glucagon (human recombinant), HYDROmorphone, HYDROmorphone, hydrOXYzine HCL, insulin aspart U-100, melatonin     Review of patient's allergies indicates:   Allergen Reactions    Lisinopril Anaphylaxis    Hydralazine analogues      Chronic constipation, impotence, dizziness     Objective:     Vital Signs (Most Recent):  Temp: 98.06 °F (36.7 °C) (08/05/22 0943)  Pulse: 95 (08/05/22 0943)  Resp: (!) 24  (08/05/22 0943)  BP: (!) 82/0 (08/05/22 0700)  SpO2: 99 % (08/05/22 0318)   Vital Signs (24h Range):  Temp:  [97.7 °F (36.5 °C)-100.94 °F (38.3 °C)] 98.06 °F (36.7 °C)  Pulse:  [] 95  Resp:  [5-54] 24  SpO2:  [99 %] 99 %  BP: (82-88)/(0) 82/0  Arterial Line BP: (79-96)/(62-86) 81/70     Weight: 93.4 kg (206 lb)  Body mass index is 27.18 kg/m².    Intake/Output - Last 3 Shifts         08/03 0700  08/04 0659 08/04 0700 08/05 0659 08/05 0700 08/06 0659    I.V. (mL/kg) 1243.1 (13.6) 1073.1 (11.5) 196.6 (2.1)    NG/  210    IV Piggyback 543.3 485 78.3    TPN 1178.2 491     Total Intake(mL/kg) 3134.6 (34.2) 2049 (21.9) 485 (5.2)    Urine (mL/kg/hr) 4510 (2.1) 4934 (2.2) 655 (2.5)    Other 50 100 0    Stool 0 0     Total Output 4560 5034 655    Net -1425.4 -2985 -170           Stool Occurrence 0 x 0 x             Physical Exam  Constitutional:       General: He is not in acute distress.  HENT:      Head: Normocephalic.   Eyes:      Extraocular Movements: Extraocular movements intact.   Neck:      Comments: Tracheostomy tube in place. No stigmata of active bleeding appreciated.   Cardiovascular:      Rate and Rhythm: Normal rate.   Pulmonary:      Effort: Pulmonary effort is normal.      Comments: Mechanically ventilated.   Neurological:      Mental Status: He is alert.       Significant Labs:  I have reviewed all pertinent lab results within the past 24 hours.    Significant Diagnostics:  I have reviewed all pertinent imaging results/findings within the past 24 hours.

## 2022-08-05 NOTE — ASSESSMENT & PLAN NOTE
Fever  Leukocytosis with associated fever up to 100.9 F status post creation of tracheostomy on 8/4/22. Blood cultures this admission have been unrevealing. Only notable pathogen isolated was Klebsiella aerogenes from sputum for which patient has been treated with cefepime. Metronidazole was added for anaerobic coverage. 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 Old DLES inspected at bedside today and found to be dry with no drainage. Newer left sided DLES bandage seen dry, clean, and intact with no drainage reported by nursing. DLES cultures performed on 7/25 showed no growth. CT c/a/p 7/29 with no evidence of infection. 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 First dose 80 mg now tapered down to 60 mg.     Recommendations  --Continue IV cefepime and metronidazole for originally anticipated 14 day pneumonia course; stop date 8/9/22   --If patient spikes another fever please obtain two sets of repeat blood cultures  --Fever likely due to tracheostomy procedure on 8/4   --Leukocytosis likely attributed to high dose steroids   --If patient will be on glucocorticoid dose equivalent to ?20 mg of prednisone daily for one month or longer then would recommend starting PJP prophylaxis with PO TMP//80 mg daily or renally dosed by pharmacy

## 2022-08-05 NOTE — PLAN OF CARE
"      SICU PLAN OF CARE NOTE    Dx: Left ventricular assist device (LVAD) complication    Shift Events: Potassium 40mEq PO, Mag PO replacement given. Tubefeeds resumed Peptamen 1.5 at 10ml/hr. Plan to increase tomorrow if tolerated overnight. Plan for SBT in AM. SVO2 66-69% this shift. Sat at side of bed with physical therapy, plan for possible cardiac chair if patient tolerates activity Vasopressin weaned to 0.02 per MD orders. Do not hold Midodrine.     Goals of Care:   Map >65  K 4.5  Mag 2.5    Neuro: Arouses to Voice, Follows Commands, and Moves All Extremities    Vital Signs: BP (!) 82/0 (BP Location: Right arm, Patient Position: Lying)   Pulse 97   Temp 98.96 °F (37.2 °C)   Resp (!) 24   Ht 6' 1" (1.854 m)   Wt 93.4 kg (206 lb)   SpO2 99%   BMI 27.18 kg/m²     Respiratory: Ventilator ACVC, Tracheostomy POD1, plan for SBT in AM    Diet: NPO, Tube Feeds, and TPN Resume TPN tonight as ordered, plan for possible increasing TF tomorrow if tolerated overnight    Gtts: Precedex, Vasopressin, Epinephrine, and Lasix   Epi 0.04 do not titrate  Vaso 0.02 do not titrate at this time  Precedex 0.6  Lasix 5mg  Heparin 21 call MD with coag results for titration  Cardene 1mg      Urine Output: Urinary Catheter 80-150cc/hour    Drains:   Wound Vac  VAD  NGT                Labs/Accuchecks:  Q4H Accuchecks  Q8H CMP MAG Phos  Q6H PTT/Anti-Xa      Skin: R abdominal previous VAD site with iodiform packing covered with gauze and secured with medipore tape, change daily per orders  L abdominal VAD site  R groin previous ECMO cannulation site with woundvac in place      "

## 2022-08-05 NOTE — CARE UPDATE
Hemodynamics from 8 PM:  On Lasix 5, epi 0.04, vaso 0.04    CVP 10 SVO2 65  CO 9.3 CI 4         Plan:  -Continue current management  -Keep MAP between 65-85  -If MAP > 85 will start on Nicardipine gtt      Plan discussed with on-call HTS staff

## 2022-08-05 NOTE — PROGRESS NOTES
08/05/2022  Carissa Dean    Current provider:  Roslyn Ragsdale DO    Device interrogation:  TXP LVAD INTERROGATIONS 8/5/2022 8/5/2022 8/5/2022 8/5/2022 8/5/2022 8/5/2022 8/5/2022   Type HeartMate3 HeartMate3 HeartMate3 HeartMate3 HeartMate3 HeartMate3 HeartMate3   Flow 5.5 5.6 5.5 5.5 5.4 5.5 5.6   Speed 6300 6300 6300 6300 6300 6300 6300   PI 2.9 2.9 2.8 2.5 2.7 2.6 2.4   Power ([place]) 5.3 5.4 5.3 5.3 5.3 5.3 5.5   LSL 5900 5900 5900 5900 5900 5900 5900   Low Flow Alarm - - - - - - -   Pulsatility Intermittent pulse Intermittent pulse Intermittent pulse Intermittent pulse Intermittent pulse Intermittent pulse Intermittent pulse          Rounded on Tim Richards to ensure all mechanical assist device settings (IABP or VAD) were appropriate and all parameters were within limits.  I was able to ensure all back up equipment was present, the staff had no issues, and the Perfusion Department daily rounding was complete.      For implantable VADs: Interrogation of Ventricular assist device was performed with analysis of device parameters and review of device function. I have personally reviewed the interrogation findings and agree with findings as stated.     In emergency, the nursing units have been notified to contact the perfusion department either by:  Calling [phone] from 630am to 4pm Mon thru Fri, utilizing the On-Call Finder functionality of Epic and searching for Perfusion, or by contacting the hospital  from 4pm to 630am and on weekends and asking to speak with the perfusionist on call.    6:43 AM

## 2022-08-05 NOTE — ASSESSMENT & PLAN NOTE
Key History and Diagnostic Findings  - Hyperglycemia noted once starting TPN in addition to steroids  - No prior history of diabetes; most recent A1c of 5.4 on 04/26/2021  - Changing from TPN to tube feeds on 08/05/2022    Plan  - Continue levemir 6 units QHS  - Continue aspart 4 units q4h with low correction  - Recommend holding scheduled aspart and using only low correction while transitioning from TPN until tube feeds are at goal  - Please notify endocrine if any change in tube feeds as this will change insulin requirements  - Please ensure that accuchecks are being documented q4h per order

## 2022-08-05 NOTE — PLAN OF CARE
"SICU PLAN OF CARE NOTE    Dx: Left ventricular assist device (LVAD) complication    Shift Events: NAEON    Goals of Care: MAP 65-85     Neuro: AAO x4, Follows Commands and Moves All Extremities    Vital Signs: BP (!) 84/0   Pulse 102   Temp 99.32 °F (37.4 °C)   Resp (!) 24   Ht 6' 1" (1.854 m)   Wt 91.6 kg (202 lb)   SpO2 99%   BMI 26.65 kg/m²     Respiratory: Ventilator    Diet: NPO    Gtts: Precedex, Vasopressin, Epinephrine, Lasix and Nicardipine    Urine Output: Urinary Catheter 175-350 cc/hour    Drains: NG/OG Tube 0 cc /  shift    Wound Vac 125mmHg continuous    VAD Heartmate 3 speed 6300         Labs/Accuchecks: Accucheck Q4H, CMP, Xa/PTT/pt-INR, Thyroid Studies    Skin: Patient turned Q2H, immerse bed plugged in and functioning properly, linen changed and complete bath given, wound care completed per order, heels elevated off bed, SCDs in place and functioning, foams in place and integrity maintained       "

## 2022-08-05 NOTE — SUBJECTIVE & OBJECTIVE
"Interval HPI:   Overnight events: No acute events reported overnight  Eating:   NPO  Nausea: No  Hypoglycemia and intervention: No  Fever: No  TPN and/or TF: Yes  If yes, type of TF/TPN and rate: TPN was at 40 ml/hr but appears to be changing to tube feeds    BP (!) 82/0 (BP Location: Right arm, Patient Position: Lying)   Pulse 92   Temp 97.88 °F (36.6 °C)   Resp (!) 25   Ht 6' 1" (1.854 m)   Wt 93.4 kg (206 lb)   SpO2 99%   BMI 27.18 kg/m²     Labs Reviewed and Include    Recent Labs   Lab 08/05/22  0319   *   CALCIUM 9.9   ALBUMIN 2.3*   PROT 7.5      K 3.9   CO2 21*      BUN 58*   CREATININE 1.5*   ALKPHOS 99   ALT 41   AST 43*   BILITOT 5.3*     Lab Results   Component Value Date    WBC 26.49 (H) 08/05/2022    HGB 7.9 (L) 08/05/2022    HCT 26.4 (L) 08/05/2022    MCV 95 08/05/2022     08/05/2022     Recent Labs   Lab 08/04/22  0337 08/05/22  0319   FREET4 3.46* 2.18*     Lab Results   Component Value Date    HGBA1C 5.4 04/26/2021       Nutritional status:   Body mass index is 27.18 kg/m².  Lab Results   Component Value Date    ALBUMIN 2.3 (L) 08/05/2022    ALBUMIN 2.4 (L) 08/04/2022    ALBUMIN 2.1 (L) 08/04/2022     Lab Results   Component Value Date    PREALBUMIN 28 08/05/2022    PREALBUMIN 13 (L) 07/29/2022    PREALBUMIN 11 (L) 07/17/2022       Estimated Creatinine Clearance: 62.9 mL/min (A) (based on SCr of 1.5 mg/dL (H)).    Accu-Checks  Recent Labs     08/03/22  1612 08/03/22  1909 08/04/22  0048 08/04/22  0334 08/04/22  0815 08/04/22  1356 08/04/22  1801 08/04/22  2119 08/05/22  0223 08/05/22  0542   POCTGLUCOSE 253* 209* 174* 145* 108 249* 193* 175* 155* 125*       Current Medications and/or Treatments Impacting Glycemic Control  Immunotherapy:    Immunosuppressants       None          Steroids:   Hormones (From admission, onward)                Start     Stop Route Frequency Ordered    08/05/22 0900  predniSONE tablet 60 mg         -- PER NG TUBE Daily 08/05/22 0740    " 08/02/22 1931  melatonin tablet 6 mg         -- OG Nightly PRN 08/02/22 1832    07/15/22 0900  vasopressin (PITRESSIN) 1 Units/mL in dextrose 5 % 100 mL infusion         -- IV Continuous 07/15/22 0900          Pressors:    Autonomic Drugs (From admission, onward)                Start     Stop Route Frequency Ordered    07/29/22 0011  EPINEPHrine (ADRENALIN) 1 mg/mL injection        Note to Pharmacy: Created by cabinet override    07/29 1214   07/29/22 0011    07/29/22 0007  EPINEPHrine 5 mg in dextrose 5% 250 mL infusion (premix)        Question Answer Comment   Begin at (in mcg/kg/min): 0.01    Titrate by: (in mcg/kg/min) 0.01    Titrate interval: (in minutes) 10    Titrate to maintain: (SBP or MAP or Cardiac Index) MAP    Greater than: (in mmHg) 65    Maximum dose: (in mcg/kg/min) 2        -- IV Continuous 07/29/22 0008    07/23/22 1400  midodrine tablet 15 mg         -- PER NG TUBE Every 8 hours 07/23/22 0944          Hyperglycemia/Diabetes Medications:   Antihyperglycemics (From admission, onward)                Start     Stop Route Frequency Ordered    08/04/22 1800  insulin aspart U-100 pen 5 Units         -- SubQ Every 4 hours 08/04/22 1710    08/03/22 2100  insulin detemir U-100 pen 6 Units         -- SubQ Nightly 08/03/22 1900    07/30/22 1023  insulin aspart U-100 pen 0-5 Units         -- SubQ Every 4 hours PRN 07/30/22 0925

## 2022-08-05 NOTE — PT/OT/SLP PROGRESS
Occupational Therapy   Co-Treatment with PT    Name: Tim Richards  MRN: [id number]  Admitting Diagnosis:  Left ventricular assist device (LVAD) complication  1 Day Post-Op    Recommendations:     Discharge Recommendations: rehabilitation facility  Discharge Equipment Recommendations:   (TBD)  Barriers to discharge:  None    Assessment:     Tim Richards is a 56 y.o. male with a medical diagnosis of Left ventricular assist device (LVAD) complication.  He presents frustration regarding medical status. He required encouragement to participate in session. Pt with c/o of 5/10 pain at new tracheostomy site. Performance deficits affecting function are weakness, impaired endurance, impaired self care skills, impaired functional mobility, gait instability, impaired balance, decreased coordination, decreased upper extremity function, decreased lower extremity function, impaired cardiopulmonary response to activity. Pt would benefit from skilled OT services in order to maximize independence with ADLs and facilitate safe discharge. Because of patient's significant decline from PLOF, patient would benefit from Inpatient Rehab to maximize return to PLOF. Pt is an excellent candidate for inpatient rehabilitation, as he has a qualifying diagnosis, displays good activity tolerance, has experienced decline from PLOF, has good family support, and is motivated to participate in therapy.       Rehab Prognosis:  Good; patient would benefit from acute skilled OT services to address these deficits and reach maximum level of function.       Plan:     Patient to be seen 5 x/week to address the above listed problems via self-care/home management, therapeutic activities, therapeutic exercises, neuromuscular re-education  · Plan of Care Expires: 08/25/22  · Plan of Care Reviewed with: patient    Subjective     Pain/Comfort:  Pain Rating 1: 5/10 (at trach)    Objective:     Communicated with: RN and PT prior to session.  Patient found  HOB elevated with telemetry, arterial line, ventilator, Tracheostomy, LVAD, SCD, central line, PICC line, wound vac, sun catheter (CVP) upon OT entry to room.    General Precautions: Standard, fall   Orthopedic Precautions:N/A   Braces: N/A  Respiratory Status: trach to ventilator     Occupational Performance:     Bed Mobility:    · Patient completed Supine to Sit with maximal assistance and 2 persons  · Patient completed Sit to Supine with maximal assistance and 2 persons     Functional Mobility/Transfers:  · Pt declined to participate this session    Activities of Daily Living:  · Lower Body Dressing: max (A) to don/doff B  socks in supine    EOB:  -Pt sat EOB x10 minutes with L lean  -Pt required max (A) for sitting balance during dynamic tasks  -Pt required CGA with cuing to achieve midline during static tasks  -Pt completed reaching activity x5 reps using B UE in planes at 90 degrees and 30 degrees; pt demonstrated good ability to cross midline    AMPAC 6 Click ADL: 12    Treatment & Education:  -Therapist provided facilitation and instruction of proper body mechanics, energy conservation, and fall prevention strategies during tasks listed above.  -Pt educated on role of OT, POC and goals for therapy  -Pt educated on importance of OOB activities with staff member assistance   -Pt verbalized understanding. Pt expressed no further concerns/questions  -Co-tx with PT performed due to need for education and assistance from two skilled therapy disciplines at pt's current functional level.      Patient left HOB elevated with all lines intact, call button in reach and  RN notified    GOALS:   Multidisciplinary Problems     Occupational Therapy Goals        Problem: Occupational Therapy    Goal Priority Disciplines Outcome Interventions   Occupational Therapy Goal     OT, PT/OT Ongoing, Progressing    Description: Goals to be met by: 8/9/22     Patient will increase functional independence with ADLs by  performing:    UE Dressing with Stand-by Assistance.  LE Dressing with Stand-by Assistance.  Grooming while standing at sink with Stand-by Assistance.  Toileting from toilet with Stand-by Assistance for hygiene and clothing management.   Toilet transfer to toilet with Stand-by Assistance.               Multidisciplinary Problems (Resolved)        Problem: Occupational Therapy    Goal Priority Disciplines Outcome Interventions   Occupational Therapy Goal   (Resolved)     OT, PT/OT Met           Problem: Occupational Therapy    Goal Priority Disciplines Outcome Interventions   Occupational Therapy Goal   (Resolved)     OT, PT/OT Met                    Time Tracking:     OT Date of Treatment: 08/05/22  OT Start Time: 1232  OT Stop Time: 1300  OT Total Time (min): 28 min    Billable Minutes:Self Care/Home Management 8  Neuromuscular Re-education 20    OT/CARY: OT          8/5/2022

## 2022-08-05 NOTE — ASSESSMENT & PLAN NOTE
Tim Richards is a 56 y.o. male who underwent heartmate 2 to heartmate 3 upgrade. Post operatively he had difficulty weaning from the vent, was briefly extubated, but had to be re intubated due to respiratory distress. He is now s/p percutaneous trach placement on 8/4/22.    -Successful placement of tracheostomy  -Vent management per primary  -General surgery to sign off. Please do not hesitate to call with any questions.

## 2022-08-05 NOTE — SUBJECTIVE & OBJECTIVE
Past Medical History:   Diagnosis Date    Anticoagulant long-term use     CHF (congestive heart failure)     Class 1 obesity due to excess calories with serious comorbidity and body mass index (BMI) of 31.0 to 31.9 in adult     Dilated cardiomyopathy 1/10/2018    Disorder of kidney and ureter     CKD    Encounter for blood transfusion     Gout     HTN (hypertension)     Hx of psychiatric care     ICD (implantable cardioverter-defibrillator) infection 7/1/2020    Psychiatric problem     Thyroid disease     Ventricular tachycardia (paroxysmal)        Past Surgical History:   Procedure Laterality Date    AORTIC VALVULOPLASTY N/A 7/13/2022    Procedure: REPAIR, AORTIC VALVE;  Surgeon: Yg Kaufman MD;  Location: [facility] OR [facility] FLR;  Service: Cardiovascular;  Laterality: N/A;    APPLICATION OF WOUND VACUUM-ASSISTED CLOSURE DEVICE N/A 7/15/2022    Procedure: APPLICATION, WOUND VAC;  Surgeon: Yg Kaufman MD;  [facility];  Service: Cardiovascular;  Laterality: N/A;  50 x 5 cm     CARDIAC CATHETERIZATION  Dec. 2012    CARDIAC DEFIBRILLATOR PLACEMENT Left     CRRT-D    COLONOSCOPY N/A 3/6/2018    Procedure: COLONOSCOPY;  Surgeon: Alonso Bone MD;  Location: [facility] (2ND FLR);  Service: Endoscopy;  Laterality: N/A;    COLONOSCOPY N/A 7/17/2019    Procedure: COLONOSCOPY;  Surgeon: Blane Valdez MD;  Location: [facility] ENDO (2ND FLR);  Service: Endoscopy;  Laterality: N/A;    COLONOSCOPY N/A 7/18/2019    Procedure: COLONOSCOPY;  Surgeon: Blane Valdez MD;  Location: [facility] ENDO (2ND FLR);  Service: Endoscopy;  Laterality: N/A;    ESOPHAGOGASTRODUODENOSCOPY N/A 7/17/2019    Procedure: EGD (ESOPHAGOGASTRODUODENOSCOPY);  Surgeon: Blane Valdez MD;  Location: [facility] ENDO (2ND FLR);  Service: Endoscopy;  Laterality: N/A;    ESOPHAGOGASTRODUODENOSCOPY N/A 7/18/2019    Procedure: EGD (ESOPHAGOGASTRODUODENOSCOPY);  Surgeon: Blane Valdez MD;  Location: [facility] ENDO (2ND FLR);  Service: Endoscopy;  Laterality: N/A;    FOREIGN BODY REMOVAL  N/A 7/22/2022    Procedure: REMOVAL, FOREIGN BODY;  Surgeon: Yg Kaufman MD;  Location: [facility] OR [facility] FLR;  Service: Cardiovascular;  Laterality: N/A;  LVAD Heartmate 2 drive line removal    INSERTION OF GRAFT TO PERICARDIUM N/A 7/15/2022    Procedure: INSERTION, GRAFT, PERICARDIUM;  Surgeon: Yg Kaufman MD;  Location: [facility] OR [facility] FLR;  Service: Cardiovascular;  Laterality: N/A;    IRRIGATION OF MEDIASTINUM N/A 7/15/2022    Procedure: IRRIGATION, MEDIASTINUM;  Surgeon: Yg Kaufman MD;  [facility];  Service: Cardiovascular;  Laterality: N/A;    LYSIS OF ADHESIONS  7/13/2022    Procedure: LYSIS, ADHESIONS;  Surgeon: Yg Kaufman MD;  [facility];  Service: Cardiovascular;;    NONINVASIVE CARDIAC ELECTROPHYSIOLOGY STUDY N/A 10/18/2019    Procedure: CARDIAC ELECTROPHYSIOLOGY STUDY, NONINVASIVE;  Surgeon: Raz Wagner MD;  Location: [facility] EP LAB;  Service: Cardiology;  Laterality: N/A;  VT, DFTs, MDT CRTD in situ, LVAD, juarez MB, 3098    REPLACEMENT OF IMPLANTABLE CARDIOVERTER-DEFIBRILLATOR (ICD) GENERATOR N/A 3/9/2020    Procedure: REPLACEMENT, ICD GENERATOR;  Surgeon: Harry Yun MD;  Location: [facility] EP LAB;  Service: Cardiology;  Laterality: N/A;  VT, ICD Gen Change and Lead Revision, MDT, MAC, DM,3 Prep    REPLACEMENT OF LEFT VENTRICULAR ASSIST DEVICE (LVAD)  7/13/2022    Procedure: REPLACEMENT, LVAD;  Surgeon: Yg Kaufamn MD;  [facility];  Service: Cardiovascular;;    REPLACEMENT OF PUMP N/A 7/13/2022    Procedure: REPLACEMENT, PUMP;  Surgeon: Yg Kaufman MD;  [facility];  Service: Cardiovascular;  Laterality: N/A;  LVAD pump exchange  EXPLANATION OF HEATMATE 2  IMPLANTATION OF HEARTMATE 3  IMPLANTATION OF 8MM CHIMNEY GRAFT TO RFA  INITIATION OF ECMO  TEMPORARY CLOSURE OF CHEST    REVISION OF IMPLANTABLE CARDIOVERTER-DEFIBRILLATOR (ICD) ELECTRODE LEAD PLACEMENT N/A 3/9/2020    Procedure: REVISION, INSERTION, ELECTRODE LEAD, ICD;   Surgeon: Harry Yun MD;  Location: [facility] EP LAB;  Service: Cardiology;  Laterality: N/A;  VT, ICD Gen Change and Lead Revision, MDT, MAC, DM,3 Prep    STERNAL WOUND CLOSURE N/A 7/14/2022    Procedure: CLOSURE, WOUND, STERNUM;  Surgeon: Yg Kaufman MD;  Location: [facility] OR [facility] FLR;  Service: Cardiovascular;  Laterality: N/A;  temporary closure  evacuation of hematoma    STERNAL WOUND CLOSURE N/A 7/15/2022    Procedure: CLOSURE, WOUND, STERNUM;  Surgeon: Yg Kaufman MD;  Location: [facility] OR [facility] FLR;  Service: Cardiovascular;  Laterality: N/A;    TRACHEOSTOMY N/A 8/4/2022    Procedure: CREATION, TRACHEOSTOMY;  Surgeon: Germain Holt MD;  [facility]  Service: General;  Laterality: N/A;    TREATMENT OF CARDIAC ARRHYTHMIA  10/18/2019    Procedure: Cardioversion or Defibrillation;  Surgeon: Raz Wagner MD;  Location: [facility] EP LAB;  Service: Cardiology;;       Review of patient's allergies indicates:   Allergen Reactions    Lisinopril Anaphylaxis    Hydralazine analogues      Chronic constipation, impotence, dizziness       Medications:  Medications Prior to Admission   Medication Sig    allopurinoL (ZYLOPRIM) 100 MG tablet TAKE 1 TABLET (100 MG) BY MOUTH NIGHTLY.    amiodarone (PACERONE) 200 MG Tab Take 2 tablets (400 mg total) by mouth once daily.    amLODIPine (NORVASC) 10 MG tablet TAKE 1 TABLET BY MOUTH EVERY DAY    aspirin (ECOTRIN) 81 MG EC tablet Take 1 tablet (81 mg total) by mouth once daily.    busPIRone (BUSPAR) 5 MG Tab Take 1 tablet (5 mg total) by mouth 3 (three) times daily.    levothyroxine (SYNTHROID) 112 MCG tablet Take 1 tablet (112 mcg total) by mouth before breakfast.    mexiletine (MEXITIL) 250 MG Cap Take 1 capsule (250 mg total) by mouth every 8 (eight) hours.    pantoprazole (PROTONIX) 40 MG tablet TAKE 1 TABLET (40 MG TOTAL) BY MOUTH DAILY WITH LUNCH.    warfarin (COUMADIN) 5 MG tablet TAKE 7.5 MG ORALLY DAILY EVERY SUNDAY AND THURSDAY, TAKE 5 MG ORALLY DAILY ON  OTHER DAYS    [DISCONTINUED] ALPRAZolam (XANAX) 1 MG tablet Take 1 tablet (1 mg total) by mouth daily as needed for Anxiety. Bid prn    [DISCONTINUED] zolpidem (AMBIEN CR) 12.5 MG CR tablet Take 1 tablet (12.5 mg total) by mouth nightly as needed for Insomnia.     Antibiotics (From admission, onward)                Start     Stop Route Frequency Ordered    07/27/22 1030  metroNIDAZOLE tablet 500 mg         -- Oral Every 8 hours 07/27/22 1020    07/25/22 2000  cefepime in dextrose 5 % IVPB 2 g         -- IV Every 12 hours (non-standard times) 07/25/22 1007    07/13/22 2130  mupirocin 2 % ointment         07/18 2059 Nasl 2 times daily 07/13/22 2035 07/12/22 2100  mupirocin 2 % ointment 1 g         07/13 2059 Nasl 2 times daily 07/12/22 1552    06/27/22 2100  mupirocin 2 % ointment         07/02 2059 Nasl 2 times daily 06/27/22 1810          Antifungals (From admission, onward)                None          Antivirals (From admission, onward)      None             Immunization History   Administered Date(s) Administered    COVID-19, MRNA, LN-S, PF (MODERNA FULL 0.5 ML DOSE) 03/12/2021, 04/09/2021    COVID-19, MRNA, LN-S, PF (Pfizer) (Purple Cap) 12/04/2021    Hepatitis A / Hepatitis B 02/23/2018    Influenza 11/01/2014, 08/17/2019    Influenza - Quadrivalent - PF *Preferred* (6 months and older) 09/23/2015, 09/21/2016, 10/05/2017, 03/05/2021    Influenza - Trivalent - PF (PED) 11/01/2014    Pneumococcal Conjugate - 13 Valent 11/01/2014    Pneumococcal Polysaccharide - 23 Valent 10/01/2015, 03/24/2016    Tdap 02/23/2018       Family History       Problem Relation (Age of Onset)    Cancer Sister (54)    Coronary artery disease Father    Diabetes Father    Heart disease Father    Hypertension Father    No Known Problems Mother, Brother          Social History     Socioeconomic History    Marital status:     Number of children: 3   Occupational History     Employer: [employer]    Tobacco Use    Smoking  status: Former Smoker     Packs/day: 1.00     Years: 31.00     Pack years: 31.00     Types: Cigarettes     Quit date: 2018     Years since quittin.5    Smokeless tobacco: Never Used    Tobacco comment: pt is quiting on his own - pt stated not qualified for program;  pt  quit on his own   Substance and Sexual Activity    Alcohol use: No     Alcohol/week: 0.0 standard drinks     Comment: quit    Drug use: No    Sexual activity: Yes     Partners: Female     Birth control/protection: None     Comment: 10/5/17  with same partner 7 years    Social History Narrative    Disabled     Review of Systems   Constitutional:  Negative for chills, fatigue, fever and unexpected weight change.   HENT:  Positive for congestion (Minimal secretions per patient). Negative for postnasal drip and trouble swallowing.    Respiratory:  Negative for cough, chest tightness and shortness of breath.    Cardiovascular:  Negative for chest pain, palpitations and leg swelling.   Gastrointestinal:  Negative for abdominal pain, blood in stool, constipation, diarrhea, nausea and vomiting.   Genitourinary:  Negative for difficulty urinating, dysuria and hematuria.   Musculoskeletal:  Negative for arthralgias and back pain.   Neurological:  Negative for dizziness and light-headedness.   Psychiatric/Behavioral:  Negative for confusion.    Objective:     Vital Signs (Most Recent):  Temp: 98.78 °F (37.1 °C) (22 1315)  Pulse: 94 (22 1315)  Resp: 15 (22 1315)  BP: (!) 78/0 (22 1100)  SpO2: 99 % (22 0318)   Vital Signs (24h Range):  Temp:  [97.88 °F (36.6 °C)-100.94 °F (38.3 °C)] 98.78 °F (37.1 °C)  Pulse:  [] 94  Resp:  [5-54] 15  SpO2:  [99 %] 99 %  BP: (78-88)/(0) 78/0  Arterial Line BP: (75-96)/(58-86) 75/66     Weight: 93.4 kg (206 lb)  Body mass index is 27.18 kg/m².    Estimated Creatinine Clearance: 72.6 mL/min (based on SCr of 1.3 mg/dL).    Physical Exam  Constitutional:       General: He is not  in acute distress.     Appearance: He is ill-appearing.   Cardiovascular:      Rate and Rhythm: Normal rate and regular rhythm.      Pulses: Normal pulses.      Heart sounds: Normal heart sounds. No murmur heard.    No friction rub. No gallop.   Pulmonary:      Effort: Pulmonary effort is normal. No respiratory distress.      Breath sounds: Normal breath sounds.      Comments: Tracheostomy intact with no notable secretions   Abdominal:      General: Abdomen is flat. Bowel sounds are normal. There is no distension.      Palpations: Abdomen is soft.      Tenderness: There is no abdominal tenderness.      Comments: Old right sided DLES dry on inspection  Left sided DLES bandage clean, dry, and intact    Skin:     General: Skin is warm and dry.      Comments: Midline chest incision healing    Neurological:      Mental Status: He is alert.       Significant Labs: All pertinent labs within the past 24 hours have been reviewed.    Significant Imaging: I have reviewed all pertinent imaging results/findings within the past 24 hours.

## 2022-08-06 LAB
ALBUMIN SERPL BCP-MCNC: 2.4 G/DL (ref 3.5–5.2)
ALBUMIN SERPL BCP-MCNC: 2.5 G/DL (ref 3.5–5.2)
ALBUMIN SERPL BCP-MCNC: 2.6 G/DL (ref 3.5–5.2)
ALLENS TEST: ABNORMAL
ALP SERPL-CCNC: 107 U/L (ref 55–135)
ALP SERPL-CCNC: 112 U/L (ref 55–135)
ALP SERPL-CCNC: 116 U/L (ref 55–135)
ALT SERPL W/O P-5'-P-CCNC: 48 U/L (ref 10–44)
ALT SERPL W/O P-5'-P-CCNC: 50 U/L (ref 10–44)
ALT SERPL W/O P-5'-P-CCNC: 51 U/L (ref 10–44)
ANION GAP SERPL CALC-SCNC: 10 MMOL/L (ref 8–16)
ANION GAP SERPL CALC-SCNC: 12 MMOL/L (ref 8–16)
ANION GAP SERPL CALC-SCNC: 12 MMOL/L (ref 8–16)
ANISOCYTOSIS BLD QL SMEAR: SLIGHT
APTT BLDCRRT: 40.2 SEC (ref 21–32)
AST SERPL-CCNC: 39 U/L (ref 10–40)
AST SERPL-CCNC: 43 U/L (ref 10–40)
AST SERPL-CCNC: 45 U/L (ref 10–40)
BACTERIA CATH TIP CULT: NO GROWTH
BASOPHILS # BLD AUTO: 0.05 K/UL (ref 0–0.2)
BASOPHILS # BLD AUTO: 0.06 K/UL (ref 0–0.2)
BASOPHILS # BLD AUTO: 0.06 K/UL (ref 0–0.2)
BASOPHILS NFR BLD: 0.2 % (ref 0–1.9)
BILIRUB SERPL-MCNC: 6 MG/DL (ref 0.1–1)
BILIRUB SERPL-MCNC: 6.2 MG/DL (ref 0.1–1)
BILIRUB SERPL-MCNC: 6.4 MG/DL (ref 0.1–1)
BUN SERPL-MCNC: 57 MG/DL (ref 6–20)
BUN SERPL-MCNC: 59 MG/DL (ref 6–20)
BUN SERPL-MCNC: 60 MG/DL (ref 6–20)
CALCIUM SERPL-MCNC: 10 MG/DL (ref 8.7–10.5)
CALCIUM SERPL-MCNC: 9.5 MG/DL (ref 8.7–10.5)
CALCIUM SERPL-MCNC: 9.8 MG/DL (ref 8.7–10.5)
CHLORIDE SERPL-SCNC: 106 MMOL/L (ref 95–110)
CHLORIDE SERPL-SCNC: 106 MMOL/L (ref 95–110)
CHLORIDE SERPL-SCNC: 107 MMOL/L (ref 95–110)
CO2 SERPL-SCNC: 19 MMOL/L (ref 23–29)
CO2 SERPL-SCNC: 19 MMOL/L (ref 23–29)
CO2 SERPL-SCNC: 22 MMOL/L (ref 23–29)
CREAT SERPL-MCNC: 1.7 MG/DL (ref 0.5–1.4)
CREAT SERPL-MCNC: 1.8 MG/DL (ref 0.5–1.4)
CREAT SERPL-MCNC: 1.9 MG/DL (ref 0.5–1.4)
DELSYS: ABNORMAL
DIFFERENTIAL METHOD: ABNORMAL
EOSINOPHIL # BLD AUTO: 0 K/UL (ref 0–0.5)
EOSINOPHIL NFR BLD: 0 % (ref 0–8)
ERYTHROCYTE [DISTWIDTH] IN BLOOD BY AUTOMATED COUNT: 19.7 % (ref 11.5–14.5)
ERYTHROCYTE [DISTWIDTH] IN BLOOD BY AUTOMATED COUNT: 19.9 % (ref 11.5–14.5)
ERYTHROCYTE [DISTWIDTH] IN BLOOD BY AUTOMATED COUNT: 19.9 % (ref 11.5–14.5)
ERYTHROCYTE [SEDIMENTATION RATE] IN BLOOD BY WESTERGREN METHOD: 24 MM/H
EST. GFR  (NO RACE VARIABLE): 41.1 ML/MIN/1.73 M^2
EST. GFR  (NO RACE VARIABLE): 43.9 ML/MIN/1.73 M^2
EST. GFR  (NO RACE VARIABLE): 47 ML/MIN/1.73 M^2
FACT X PPP CHRO-ACNC: 0.37 IU/ML (ref 0.3–0.7)
FACT X PPP CHRO-ACNC: 0.39 IU/ML (ref 0.3–0.7)
FIBRINOGEN PPP-MCNC: 531 MG/DL (ref 182–400)
FIO2: 40
GLUCOSE SERPL-MCNC: 131 MG/DL (ref 70–110)
GLUCOSE SERPL-MCNC: 134 MG/DL (ref 70–110)
GLUCOSE SERPL-MCNC: 139 MG/DL (ref 70–110)
HCO3 UR-SCNC: 22.7 MMOL/L (ref 24–28)
HCT VFR BLD AUTO: 27.5 % (ref 40–54)
HCT VFR BLD AUTO: 28 % (ref 40–54)
HCT VFR BLD AUTO: 28.2 % (ref 40–54)
HGB BLD-MCNC: 8.2 G/DL (ref 14–18)
HGB BLD-MCNC: 8.2 G/DL (ref 14–18)
HGB BLD-MCNC: 8.3 G/DL (ref 14–18)
HYPOCHROMIA BLD QL SMEAR: ABNORMAL
IMM GRANULOCYTES # BLD AUTO: 1.01 K/UL (ref 0–0.04)
IMM GRANULOCYTES # BLD AUTO: 1.04 K/UL (ref 0–0.04)
IMM GRANULOCYTES # BLD AUTO: 1.24 K/UL (ref 0–0.04)
IMM GRANULOCYTES NFR BLD AUTO: 3.1 % (ref 0–0.5)
IMM GRANULOCYTES NFR BLD AUTO: 3.2 % (ref 0–0.5)
IMM GRANULOCYTES NFR BLD AUTO: 3.6 % (ref 0–0.5)
INR PPP: 1.3 (ref 0.8–1.2)
LDH SERPL L TO P-CCNC: 278 U/L (ref 110–260)
LYMPHOCYTES # BLD AUTO: 0.6 K/UL (ref 1–4.8)
LYMPHOCYTES # BLD AUTO: 0.7 K/UL (ref 1–4.8)
LYMPHOCYTES # BLD AUTO: 0.8 K/UL (ref 1–4.8)
LYMPHOCYTES NFR BLD: 1.9 % (ref 18–48)
LYMPHOCYTES NFR BLD: 2.3 % (ref 18–48)
LYMPHOCYTES NFR BLD: 2.3 % (ref 18–48)
MAGNESIUM SERPL-MCNC: 2.2 MG/DL (ref 1.6–2.6)
MAGNESIUM SERPL-MCNC: 2.4 MG/DL (ref 1.6–2.6)
MAGNESIUM SERPL-MCNC: 2.4 MG/DL (ref 1.6–2.6)
MCH RBC QN AUTO: 27.8 PG (ref 27–31)
MCH RBC QN AUTO: 28.1 PG (ref 27–31)
MCH RBC QN AUTO: 28.4 PG (ref 27–31)
MCHC RBC AUTO-ENTMCNC: 29.1 G/DL (ref 32–36)
MCHC RBC AUTO-ENTMCNC: 29.6 G/DL (ref 32–36)
MCHC RBC AUTO-ENTMCNC: 29.8 G/DL (ref 32–36)
MCV RBC AUTO: 93 FL (ref 82–98)
MCV RBC AUTO: 96 FL (ref 82–98)
MCV RBC AUTO: 97 FL (ref 82–98)
MODE: ABNORMAL
MONOCYTES # BLD AUTO: 1.7 K/UL (ref 0.3–1)
MONOCYTES # BLD AUTO: 2 K/UL (ref 0.3–1)
MONOCYTES # BLD AUTO: 2.3 K/UL (ref 0.3–1)
MONOCYTES NFR BLD: 5.1 % (ref 4–15)
MONOCYTES NFR BLD: 5.7 % (ref 4–15)
MONOCYTES NFR BLD: 7.1 % (ref 4–15)
NEUTROPHILS # BLD AUTO: 28.1 K/UL (ref 1.8–7.7)
NEUTROPHILS # BLD AUTO: 29.4 K/UL (ref 1.8–7.7)
NEUTROPHILS # BLD AUTO: 30.3 K/UL (ref 1.8–7.7)
NEUTROPHILS NFR BLD: 87.3 % (ref 38–73)
NEUTROPHILS NFR BLD: 88.2 % (ref 38–73)
NEUTROPHILS NFR BLD: 89.6 % (ref 38–73)
NRBC BLD-RTO: 0 /100 WBC
PCO2 BLDA: 37.6 MMHG (ref 35–45)
PEEP: 5
PH SMN: 7.39 [PH] (ref 7.35–7.45)
PHOSPHATE SERPL-MCNC: 3.8 MG/DL (ref 2.7–4.5)
PHOSPHATE SERPL-MCNC: 4.1 MG/DL (ref 2.7–4.5)
PHOSPHATE SERPL-MCNC: 4.1 MG/DL (ref 2.7–4.5)
PLATELET # BLD AUTO: 389 K/UL (ref 150–450)
PLATELET # BLD AUTO: 396 K/UL (ref 150–450)
PLATELET # BLD AUTO: 405 K/UL (ref 150–450)
PLATELET BLD QL SMEAR: ABNORMAL
PMV BLD AUTO: 11.6 FL (ref 9.2–12.9)
PMV BLD AUTO: 11.6 FL (ref 9.2–12.9)
PMV BLD AUTO: 12.2 FL (ref 9.2–12.9)
PO2 BLDA: 34 MMHG (ref 40–60)
POC BE: -2 MMOL/L
POC SATURATED O2: 64 % (ref 95–100)
POC TCO2: 24 MMOL/L (ref 24–29)
POCT GLUCOSE: 121 MG/DL (ref 70–110)
POCT GLUCOSE: 124 MG/DL (ref 70–110)
POCT GLUCOSE: 125 MG/DL (ref 70–110)
POCT GLUCOSE: 135 MG/DL (ref 70–110)
POCT GLUCOSE: 146 MG/DL (ref 70–110)
POLYCHROMASIA BLD QL SMEAR: ABNORMAL
POTASSIUM SERPL-SCNC: 4.2 MMOL/L (ref 3.5–5.1)
POTASSIUM SERPL-SCNC: 4.7 MMOL/L (ref 3.5–5.1)
POTASSIUM SERPL-SCNC: 5 MMOL/L (ref 3.5–5.1)
PROT SERPL-MCNC: 6.8 G/DL (ref 6–8.4)
PROT SERPL-MCNC: 7 G/DL (ref 6–8.4)
PROT SERPL-MCNC: 7.4 G/DL (ref 6–8.4)
PROTHROMBIN TIME: 13.1 SEC (ref 9–12.5)
PROTHROMBIN TIME: 13.2 SEC (ref 9–12.5)
PROTHROMBIN TIME: 13.4 SEC (ref 9–12.5)
RBC # BLD AUTO: 2.92 M/UL (ref 4.6–6.2)
RBC # BLD AUTO: 2.92 M/UL (ref 4.6–6.2)
RBC # BLD AUTO: 2.95 M/UL (ref 4.6–6.2)
SAMPLE: ABNORMAL
SITE: ABNORMAL
SODIUM SERPL-SCNC: 137 MMOL/L (ref 136–145)
SODIUM SERPL-SCNC: 138 MMOL/L (ref 136–145)
SODIUM SERPL-SCNC: 138 MMOL/L (ref 136–145)
T4 FREE SERPL-MCNC: 2.95 NG/DL (ref 0.71–1.51)
VT: 550
WBC # BLD AUTO: 32.21 K/UL (ref 3.9–12.7)
WBC # BLD AUTO: 32.81 K/UL (ref 3.9–12.7)
WBC # BLD AUTO: 34.37 K/UL (ref 3.9–12.7)

## 2022-08-06 PROCEDURE — 25000003 PHARM REV CODE 250: Performed by: STUDENT IN AN ORGANIZED HEALTH CARE EDUCATION/TRAINING PROGRAM

## 2022-08-06 PROCEDURE — 85610 PROTHROMBIN TIME: CPT | Performed by: THORACIC SURGERY (CARDIOTHORACIC VASCULAR SURGERY)

## 2022-08-06 PROCEDURE — 25000003 PHARM REV CODE 250

## 2022-08-06 PROCEDURE — 82803 BLOOD GASES ANY COMBINATION: CPT

## 2022-08-06 PROCEDURE — 99232 SBSQ HOSP IP/OBS MODERATE 35: CPT | Mod: ,,, | Performed by: INTERNAL MEDICINE

## 2022-08-06 PROCEDURE — 63600175 PHARM REV CODE 636 W HCPCS: Performed by: STUDENT IN AN ORGANIZED HEALTH CARE EDUCATION/TRAINING PROGRAM

## 2022-08-06 PROCEDURE — 94003 VENT MGMT INPAT SUBQ DAY: CPT

## 2022-08-06 PROCEDURE — 84100 ASSAY OF PHOSPHORUS: CPT | Mod: 91 | Performed by: THORACIC SURGERY (CARDIOTHORACIC VASCULAR SURGERY)

## 2022-08-06 PROCEDURE — 27000644 HC VENT IN-LINE ADAPTER

## 2022-08-06 PROCEDURE — 27000221 HC OXYGEN, UP TO 24 HOURS

## 2022-08-06 PROCEDURE — 94640 AIRWAY INHALATION TREATMENT: CPT

## 2022-08-06 PROCEDURE — 85025 COMPLETE CBC W/AUTO DIFF WBC: CPT | Mod: 91 | Performed by: THORACIC SURGERY (CARDIOTHORACIC VASCULAR SURGERY)

## 2022-08-06 PROCEDURE — C9113 INJ PANTOPRAZOLE SODIUM, VIA: HCPCS

## 2022-08-06 PROCEDURE — 83735 ASSAY OF MAGNESIUM: CPT | Performed by: THORACIC SURGERY (CARDIOTHORACIC VASCULAR SURGERY)

## 2022-08-06 PROCEDURE — 93750 INTERROGATION VAD IN PERSON: CPT | Mod: ,,, | Performed by: INTERNAL MEDICINE

## 2022-08-06 PROCEDURE — 99291 CRITICAL CARE FIRST HOUR: CPT | Mod: ,,, | Performed by: INTERNAL MEDICINE

## 2022-08-06 PROCEDURE — 63600175 PHARM REV CODE 636 W HCPCS

## 2022-08-06 PROCEDURE — 27000248 HC VAD-ADDITIONAL DAY

## 2022-08-06 PROCEDURE — 63600175 PHARM REV CODE 636 W HCPCS: Performed by: INTERNAL MEDICINE

## 2022-08-06 PROCEDURE — 99900035 HC TECH TIME PER 15 MIN (STAT)

## 2022-08-06 PROCEDURE — 85014 HEMATOCRIT: CPT

## 2022-08-06 PROCEDURE — 99291 PR CRITICAL CARE, E/M 30-74 MINUTES: ICD-10-PCS | Mod: ,,, | Performed by: INTERNAL MEDICINE

## 2022-08-06 PROCEDURE — 85730 THROMBOPLASTIN TIME PARTIAL: CPT | Performed by: THORACIC SURGERY (CARDIOTHORACIC VASCULAR SURGERY)

## 2022-08-06 PROCEDURE — B4185 PARENTERAL SOL 10 GM LIPIDS: HCPCS | Performed by: STUDENT IN AN ORGANIZED HEALTH CARE EDUCATION/TRAINING PROGRAM

## 2022-08-06 PROCEDURE — 37799 UNLISTED PX VASCULAR SURGERY: CPT

## 2022-08-06 PROCEDURE — 25000003 PHARM REV CODE 250: Performed by: PHYSICIAN ASSISTANT

## 2022-08-06 PROCEDURE — A4217 STERILE WATER/SALINE, 500 ML: HCPCS | Performed by: STUDENT IN AN ORGANIZED HEALTH CARE EDUCATION/TRAINING PROGRAM

## 2022-08-06 PROCEDURE — 83615 LACTATE (LD) (LDH) ENZYME: CPT | Performed by: STUDENT IN AN ORGANIZED HEALTH CARE EDUCATION/TRAINING PROGRAM

## 2022-08-06 PROCEDURE — 85384 FIBRINOGEN ACTIVITY: CPT | Performed by: THORACIC SURGERY (CARDIOTHORACIC VASCULAR SURGERY)

## 2022-08-06 PROCEDURE — 80053 COMPREHEN METABOLIC PANEL: CPT | Performed by: THORACIC SURGERY (CARDIOTHORACIC VASCULAR SURGERY)

## 2022-08-06 PROCEDURE — 84132 ASSAY OF SERUM POTASSIUM: CPT

## 2022-08-06 PROCEDURE — 82330 ASSAY OF CALCIUM: CPT

## 2022-08-06 PROCEDURE — 99232 PR SUBSEQUENT HOSPITAL CARE,LEVL II: ICD-10-PCS | Mod: ,,, | Performed by: INTERNAL MEDICINE

## 2022-08-06 PROCEDURE — 20000000 HC ICU ROOM

## 2022-08-06 PROCEDURE — 99900026 HC AIRWAY MAINTENANCE (STAT)

## 2022-08-06 PROCEDURE — 25000242 PHARM REV CODE 250 ALT 637 W/ HCPCS: Performed by: THORACIC SURGERY (CARDIOTHORACIC VASCULAR SURGERY)

## 2022-08-06 PROCEDURE — 82800 BLOOD PH: CPT

## 2022-08-06 PROCEDURE — 94761 N-INVAS EAR/PLS OXIMETRY MLT: CPT

## 2022-08-06 PROCEDURE — 84439 ASSAY OF FREE THYROXINE: CPT | Performed by: THORACIC SURGERY (CARDIOTHORACIC VASCULAR SURGERY)

## 2022-08-06 PROCEDURE — 85520 HEPARIN ASSAY: CPT | Performed by: THORACIC SURGERY (CARDIOTHORACIC VASCULAR SURGERY)

## 2022-08-06 PROCEDURE — 94664 DEMO&/EVAL PT USE INHALER: CPT

## 2022-08-06 PROCEDURE — 84295 ASSAY OF SERUM SODIUM: CPT

## 2022-08-06 PROCEDURE — 93750 PR INTERROGATE VENT ASSIST DEV, IN PERSON, W PHYSICIAN ANALYSIS: ICD-10-PCS | Mod: ,,, | Performed by: INTERNAL MEDICINE

## 2022-08-06 PROCEDURE — 25000242 PHARM REV CODE 250 ALT 637 W/ HCPCS

## 2022-08-06 PROCEDURE — 82565 ASSAY OF CREATININE: CPT

## 2022-08-06 PROCEDURE — 25000003 PHARM REV CODE 250: Performed by: INTERNAL MEDICINE

## 2022-08-06 RX ORDER — ONDANSETRON 2 MG/ML
INJECTION INTRAMUSCULAR; INTRAVENOUS
Status: COMPLETED
Start: 2022-08-06 | End: 2022-08-06

## 2022-08-06 RX ORDER — LIDOCAINE HYDROCHLORIDE 20 MG/ML
15 SOLUTION OROPHARYNGEAL ONCE
Status: COMPLETED | OUTPATIENT
Start: 2022-08-07 | End: 2022-08-07

## 2022-08-06 RX ORDER — ONDANSETRON 2 MG/ML
4 INJECTION INTRAMUSCULAR; INTRAVENOUS ONCE
Status: COMPLETED | OUTPATIENT
Start: 2022-08-06 | End: 2022-08-06

## 2022-08-06 RX ORDER — ONDANSETRON 2 MG/ML
4 INJECTION INTRAMUSCULAR; INTRAVENOUS ONCE
Status: DISCONTINUED | OUTPATIENT
Start: 2022-08-06 | End: 2022-09-01 | Stop reason: HOSPADM

## 2022-08-06 RX ORDER — MAG HYDROX/ALUMINUM HYD/SIMETH 200-200-20
30 SUSPENSION, ORAL (FINAL DOSE FORM) ORAL ONCE
Status: COMPLETED | OUTPATIENT
Start: 2022-08-07 | End: 2022-08-07

## 2022-08-06 RX ADMIN — MIDODRINE HYDROCHLORIDE 15 MG: 5 TABLET ORAL at 06:08

## 2022-08-06 RX ADMIN — ASPIRIN 81 MG CHEWABLE TABLET 81 MG: 81 TABLET CHEWABLE at 09:08

## 2022-08-06 RX ADMIN — ONDANSETRON 4 MG: 2 INJECTION INTRAMUSCULAR; INTRAVENOUS at 06:08

## 2022-08-06 RX ADMIN — LEVALBUTEROL HYDROCHLORIDE 0.63 MG: 0.63 SOLUTION RESPIRATORY (INHALATION) at 12:08

## 2022-08-06 RX ADMIN — MEXILETINE HYDROCHLORIDE 400 MG: 200 CAPSULE ORAL at 09:08

## 2022-08-06 RX ADMIN — LEVALBUTEROL HYDROCHLORIDE 0.63 MG: 0.63 SOLUTION RESPIRATORY (INHALATION) at 07:08

## 2022-08-06 RX ADMIN — ACETYLCYSTEINE 4 ML: 100 SOLUTION ORAL; RESPIRATORY (INHALATION) at 07:08

## 2022-08-06 RX ADMIN — CEFEPIME 2 G: 2 INJECTION, POWDER, FOR SOLUTION INTRAVENOUS at 09:08

## 2022-08-06 RX ADMIN — Medication 6 MG: at 09:08

## 2022-08-06 RX ADMIN — VASOPRESSIN 0.02 UNITS/MIN: 20 INJECTION INTRAVENOUS at 06:08

## 2022-08-06 RX ADMIN — AMIODARONE HYDROCHLORIDE 400 MG: 200 TABLET ORAL at 02:08

## 2022-08-06 RX ADMIN — MEXILETINE HYDROCHLORIDE 400 MG: 200 CAPSULE ORAL at 02:08

## 2022-08-06 RX ADMIN — HEPARIN SODIUM 2000 UNITS/HR: 5000 INJECTION INTRAVENOUS; SUBCUTANEOUS at 07:08

## 2022-08-06 RX ADMIN — CHOLESTYRAMINE 4 G: 4 POWDER, FOR SUSPENSION ORAL at 09:08

## 2022-08-06 RX ADMIN — METHIMAZOLE 20 MG: 10 TABLET ORAL at 03:08

## 2022-08-06 RX ADMIN — SMOFLIPID 250 ML: 6; 6; 5; 3 INJECTION, EMULSION INTRAVENOUS at 09:08

## 2022-08-06 RX ADMIN — AMIODARONE HYDROCHLORIDE 400 MG: 200 TABLET ORAL at 09:08

## 2022-08-06 RX ADMIN — METHIMAZOLE 20 MG: 10 TABLET ORAL at 09:08

## 2022-08-06 RX ADMIN — FUROSEMIDE 5 MG/HR: 10 INJECTION, SOLUTION INTRAMUSCULAR; INTRAVENOUS at 04:08

## 2022-08-06 RX ADMIN — PANTOPRAZOLE SODIUM 40 MG: 40 INJECTION, POWDER, FOR SOLUTION INTRAVENOUS at 09:08

## 2022-08-06 RX ADMIN — MIDODRINE HYDROCHLORIDE 15 MG: 5 TABLET ORAL at 02:08

## 2022-08-06 RX ADMIN — METRONIDAZOLE 500 MG: 500 TABLET ORAL at 02:08

## 2022-08-06 RX ADMIN — ONDANSETRON 4 MG: 2 INJECTION INTRAMUSCULAR; INTRAVENOUS at 10:08

## 2022-08-06 RX ADMIN — MEXILETINE HYDROCHLORIDE 400 MG: 200 CAPSULE ORAL at 06:08

## 2022-08-06 RX ADMIN — ONDANSETRON 4 MG: 2 INJECTION INTRAMUSCULAR; INTRAVENOUS at 01:08

## 2022-08-06 RX ADMIN — POTASSIUM BICARBONATE 40 MEQ: 391 TABLET, EFFERVESCENT ORAL at 09:08

## 2022-08-06 RX ADMIN — HYDROXYZINE HYDROCHLORIDE 25 MG: 25 TABLET ORAL at 11:08

## 2022-08-06 RX ADMIN — MAGNESIUM SULFATE HEPTAHYDRATE: 500 INJECTION, SOLUTION INTRAMUSCULAR; INTRAVENOUS at 09:08

## 2022-08-06 RX ADMIN — CHOLESTYRAMINE 4 G: 4 POWDER, FOR SUSPENSION ORAL at 12:08

## 2022-08-06 RX ADMIN — METRONIDAZOLE 500 MG: 500 TABLET ORAL at 09:08

## 2022-08-06 RX ADMIN — ACETYLCYSTEINE 4 ML: 100 SOLUTION ORAL; RESPIRATORY (INHALATION) at 12:08

## 2022-08-06 RX ADMIN — HEPARIN SODIUM 2000 UNITS/HR: 5000 INJECTION INTRAVENOUS; SUBCUTANEOUS at 06:08

## 2022-08-06 RX ADMIN — Medication 400 MG: at 09:08

## 2022-08-06 RX ADMIN — HYDROMORPHONE HYDROCHLORIDE 0.5 MG: 1 INJECTION, SOLUTION INTRAMUSCULAR; INTRAVENOUS; SUBCUTANEOUS at 06:08

## 2022-08-06 RX ADMIN — MIDODRINE HYDROCHLORIDE 15 MG: 5 TABLET ORAL at 09:08

## 2022-08-06 RX ADMIN — INSULIN DETEMIR 6 UNITS: 100 INJECTION, SOLUTION SUBCUTANEOUS at 09:08

## 2022-08-06 RX ADMIN — PREDNISONE 60 MG: 20 TABLET ORAL at 09:08

## 2022-08-06 RX ADMIN — METRONIDAZOLE 500 MG: 500 TABLET ORAL at 06:08

## 2022-08-06 RX ADMIN — Medication 0.04 MCG/KG/MIN: at 09:08

## 2022-08-06 NOTE — PROGRESS NOTES
[facility]  Endocrinology  Progress Note    Admit Date: 6/27/2022     56 year-old  male with NICM with LVAD, s/p ICD for VT on amiodarone and mexiletine and AIT followed by hypothyroidism initially admitted 6/27/2022 with COVID respiratory failure complicated with LVAD pump thrombosis that was refractory to medical therapy. Underwent LVAD pump exchange. Post-op course complicated by worsening cardiogenic shock/RV failure requiring VA-ECMO. Improved clinically underwent successful decannulation. Continued episodes of VT with ICD shock 7/21 and ongoing anti-arrhythmic mediation adjustments. TFTs on 7/27 significant for recurrent hyperthyroidism with undetectable TSH and elevated fT4.    - Patient re-intubated 07/29/2022    - Endocrinology consulted for recurrent AIT    Regarding AIT:    - Last seen outpatient by Dr. 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 He required a prolonged course of prednisone and methimazole, then subsequently developed hypothyroidism in May 2020. LT4 was adjusted based on serial TFT measurements. Most recently levothyroxine dose has been 112 mcg     - He self-decreased his amiodarone dose to 200 mg daily about 6 months ago. T4 was high on 7/13, but he was continued on levothyroxine 112 mcg    Lab Results   Component Value Date    TSH <0.010 (L) 07/27/2022    TSH 0.111 (L) 07/13/2022    TSH 4.079 (H) 03/17/2022    FREET4 2.95 (H) 08/06/2022    FREET4 2.18 (H) 08/05/2022    FREET4 3.46 (H) 08/04/2022       Interval HPI:   Overnight events:  No acute events reported overnight, some nausea  Eating:   NPO  Nausea: Yes  Hypoglycemia and intervention: No  Fever: No  TPN and/or TF: Yes  If yes, type of TF/TPN and rate:  Transitioning from TPN to tube feeds    BP (!) 86/0 (BP Location: Right arm, Patient Position: Lying)   Pulse 100   Temp 97.7 °F (36.5 °C)   Resp (!) 24   Ht 6'  "1" (1.854 m)   Wt 82.6 kg (182 lb)   SpO2 99% Comment: per iSTAT  BMI 24.01 kg/m²     Labs Reviewed and Include    Recent Labs   Lab 08/06/22 0342   *   CALCIUM 10.0   ALBUMIN 2.5*   PROT 7.4      K 5.0   CO2 19*      BUN 57*   CREATININE 1.7*   ALKPHOS 107   ALT 48*   AST 45*   BILITOT 6.0*     Lab Results   Component Value Date    WBC 32.21 (H) 08/06/2022    HGB 8.2 (L) 08/06/2022    HCT 28.2 (L) 08/06/2022    MCV 97 08/06/2022     08/06/2022     Recent Labs   Lab 08/05/22  0319 08/06/22 0342   FREET4 2.18* 2.95*     Lab Results   Component Value Date    HGBA1C 5.4 04/26/2021       Nutritional status:   Body mass index is 24.01 kg/m².  Lab Results   Component Value Date    ALBUMIN 2.5 (L) 08/06/2022    ALBUMIN 2.5 (L) 08/05/2022    ALBUMIN 2.3 (L) 08/05/2022     Lab Results   Component Value Date    PREALBUMIN 28 08/05/2022    PREALBUMIN 13 (L) 07/29/2022    PREALBUMIN 11 (L) 07/17/2022       Estimated Creatinine Clearance: 55.5 mL/min (A) (based on SCr of 1.7 mg/dL (H)).    Accu-Checks  Recent Labs     08/04/22  0815 08/04/22  1356 08/04/22  1801 08/04/22  2119 08/05/22  0223 08/05/22  0542 08/05/22  0745 08/05/22  1208 08/05/22  1722 08/05/22  2110   POCTGLUCOSE 108 249* 193* 175* 155* 125* 125* 151* 183* 138*       Current Medications and/or Treatments Impacting Glycemic Control  Immunotherapy:    Immunosuppressants       None          Steroids:   Hormones (From admission, onward)                Start     Stop Route Frequency Ordered    08/05/22 0900  predniSONE tablet 60 mg         -- PER NG TUBE Daily 08/05/22 0740    08/02/22 1931  melatonin tablet 6 mg         -- OG Nightly PRN 08/02/22 1832    07/15/22 0900  vasopressin (PITRESSIN) 1 Units/mL in dextrose 5 % 100 mL infusion         -- IV Continuous 07/15/22 0900          Pressors:    Autonomic Drugs (From admission, onward)                Start     Stop Route Frequency Ordered    07/29/22 0011  EPINEPHrine (ADRENALIN) 1 mg/mL " injection        Note to Pharmacy: Created by cabinet override    07/29 1214   07/29/22 0011    07/29/22 0007  EPINEPHrine 5 mg in dextrose 5% 250 mL infusion (premix)        Question Answer Comment   Begin at (in mcg/kg/min): 0.01    Titrate by: (in mcg/kg/min) 0.01    Titrate interval: (in minutes) 10    Titrate to maintain: (SBP or MAP or Cardiac Index) MAP    Greater than: (in mmHg) 65    Maximum dose: (in mcg/kg/min) 2        -- IV Continuous 07/29/22 0008    07/23/22 1400  midodrine tablet 15 mg         -- PER NG TUBE Every 8 hours 07/23/22 0944          Hyperglycemia/Diabetes Medications:   Antihyperglycemics (From admission, onward)                Start     Stop Route Frequency Ordered    08/05/22 1200  insulin aspart U-100 pen 5 Units         -- SubQ 6 times per day 08/05/22 0930    08/03/22 2100  insulin detemir U-100 pen 6 Units         -- SubQ Nightly 08/03/22 1900    07/30/22 1023  insulin aspart U-100 pen 0-5 Units         -- SubQ Every 4 hours PRN 07/30/22 0925            ASSESSMENT and PLAN    Amiodarone-induced hyperthyroidism  Key History and Diagnostic Findings  - History of AIT type 2 (TRAb and TSI negative; Thyroid US unremarkable with low vascularity)  - Weaned off methimazole and prednisone by May 2020, then developed hypothyroidism, LT4 started and dose titrated per TFTs. Prior to current admission he was on LT4 112 mcg daily  - Labs consistent with hypothyroidism until current admission, initially on 7/13, with low TSH and elevated fT4. Repeat TFTs on 7/27 with worsening hyperthyroidism. He continued to receive LT4 112 mcg through 7/28. He remains on amiodarone for episodes of VT  - Suspect current hyperthyroidism is AIT, however continued exogenous LT4 certainly contribute to current presentation   - Free T4 worsened today at 2.95 from 2.2 yesterday  Lab Results   Component Value Date    TSH <0.010 (L) 07/27/2022    TSH 0.111 (L) 07/13/2022    TSH 4.079 (H) 03/17/2022    FREET4 2.95 (H)  08/06/2022    FREET4 2.18 (H) 08/05/2022    FREET4 3.46 (H) 08/04/2022     Plan  - Strongly suspect AIT (type 1 or 2); continuing empiric treatment for both  - Heparin can cause a false elevation in free T4 as it displaces free T4 from TBG; will follow-up direct dialysis result which may take up to 1 week  - Continue Methimazole 20 mg TID   - Continue Prednisone 60 mg daily  - Continue Cholestyramine 4g QID  - Continue checking daily free T4    amiodarone induced hypothyrodism  - Levothyroxine discontinued on 7/28/2022  - Please see plan as above    Hyperglycemia  Key History and Diagnostic Findings  - Hyperglycemia noted once starting TPN in addition to steroids  - No prior history of diabetes; most recent A1c of 5.4 on 04/26/2021  - Changing from TPN to tube feeds on 08/05/2022    Plan  - Continue levemir 6 units QHS  - Resume aspart 4 units q4h with low correction once tube feeds are brought to goal  - Recommend holding scheduled aspart and using only low correction while transitioning from TPN until tube feeds are at goal  - Please notify endocrine if any change in tube feeds as this will change insulin requirements  - Please ensure that accuchecks are being documented q4h per order      Akira Zuñiga DO  [facility], [address]  [address] [place], [place], [address]    Office: [phone]  Fax: [phone]    Disclaimer: This note has been generated using voice-recognition software. There may be typographical errors that have been missed during proof-reading.    The above history labs imaging impression and plan were discussed with attending physician who is in agreement and also took part in this patient's care.  I personally reviewed all of the patients available medications, labs, imaging, vitals, allergies, medical history.

## 2022-08-06 NOTE — ASSESSMENT & PLAN NOTE
Key History and Diagnostic Findings  - History of AIT type 2 (TRAb and TSI negative; Thyroid US unremarkable with low vascularity)  - Weaned off methimazole and prednisone by May 2020, then developed hypothyroidism, LT4 started and dose titrated per TFTs. Prior to current admission he was on LT4 112 mcg daily  - Labs consistent with hypothyroidism until current admission, initially on 7/13, with low TSH and elevated fT4. Repeat TFTs on 7/27 with worsening hyperthyroidism. He continued to receive LT4 112 mcg through 7/28. He remains on amiodarone for episodes of VT  - Suspect current hyperthyroidism is AIT, however continued exogenous LT4 certainly contribute to current presentation   - Free T4 worsened today at 2.95 from 2.2 yesterday  Lab Results   Component Value Date    TSH <0.010 (L) 07/27/2022    TSH 0.111 (L) 07/13/2022    TSH 4.079 (H) 03/17/2022    FREET4 2.95 (H) 08/06/2022    FREET4 2.18 (H) 08/05/2022    FREET4 3.46 (H) 08/04/2022     Plan  - Strongly suspect AIT (type 1 or 2); continuing empiric treatment for both  - Heparin can cause a false elevation in free T4 as it displaces free T4 from TBG; will follow-up direct dialysis result which may take up to 1 week  - Continue Methimazole 20 mg TID   - Continue Prednisone 60 mg daily  - Continue Cholestyramine 4g QID  - Continue checking daily free T4

## 2022-08-06 NOTE — PLAN OF CARE
SICU PLAN OF CARE NOTE    Dx: Left ventricular assist device (LVAD) complication    Shift Events: Vaso turned off per MD order. Epi remains @ 0.04. Heparin therapeutic. Lasix gtt with adequate UOP. CVP 9-8-10. Remains on AC/VC 40% 5 PEEP with some thick secretions. WV intact to right groin. Right abd incision cleaned and packed with iodoform as ordered with minimal drainage noted. Left abd Drive line site sterile dressing change complete with small amount of green/tan drainage noted, pic sent to VAD coord noting improvement. Hydrafer Blue foam dressing applied directly to site as ordered. 2 loose BMs this shift. TF held since last night due to nausea and abdominal discomfort. Still complains of nausea with one-time IVP Zofran called in as needed. NGT to LIWS with one episode of clear emesis a few after oral meds flushed and clamped per NGT. KUB negative. TPN ordered for tonight.     Gtts: Epinephrine, Lasix, and Heparin    Urine Output: Urinary Catheter 100-150 cc/hour (1640ml/shift)    Drains: NGT output: 200 ml/shift     Wound Vac right groin: CDI output: 50ml/shift     VAD speed: 6300 rpm            Flow: 5.7-5.8           PI: 1.7-2.4           Power: 5.4-5.5    Labs/Accuchecks: Q8hr chem/CBC. Accuchecks Q4hr, AntiXa Q6hr    Skin: No new skin breakdown noted. See assessment for details and WC orders. Heels elevated on pillow. TQ2 with minimal assist. Immerse Specialty bed in use and bed functioning properly.

## 2022-08-06 NOTE — ASSESSMENT & PLAN NOTE
Key History and Diagnostic Findings  - Hyperglycemia noted once starting TPN in addition to steroids  - No prior history of diabetes; most recent A1c of 5.4 on 04/26/2021  - Changing from TPN to tube feeds on 08/05/2022    Plan  - Continue levemir 6 units QHS  - Resume aspart 4 units q4h with low correction once tube feeds are brought to goal  - Recommend holding scheduled aspart and using only low correction while transitioning from TPN until tube feeds are at goal  - Please notify endocrine if any change in tube feeds as this will change insulin requirements  - Please ensure that accuchecks are being documented q4h per order

## 2022-08-06 NOTE — PROGRESS NOTES
I have reviewed and concur with Dr. Rodriguez's history, physical, assessment, and plan.  I have personally interviewed and examined the patient.  See below addendum for my evaluation and additional findings.    Patient with significant fluctuations in free T4 despite high doses of methimazole and prednisone as well as cholestyramine.  Given that he is on a heparin drip there is concern for assay interference causing falsely elevated free T4.  Given his current medical state is difficult to determine if he is clinically hyperthyroid.  Will hold off on changing his intake thyroid regimen this time and await results direct free T4 measurement to whether not heparin drip is causing falsely elevated free T4.    Wendy Casper MD

## 2022-08-06 NOTE — SUBJECTIVE & OBJECTIVE
"Interval HPI:   Overnight events:  No acute events reported overnight, some nausea  Eating:   NPO  Nausea: Yes  Hypoglycemia and intervention: No  Fever: No  TPN and/or TF: Yes  If yes, type of TF/TPN and rate:  Transitioning from TPN to tube feeds    BP (!) 86/0 (BP Location: Right arm, Patient Position: Lying)   Pulse 100   Temp 97.7 °F (36.5 °C)   Resp (!) 24   Ht 6' 1" (1.854 m)   Wt 82.6 kg (182 lb)   SpO2 99% Comment: per iSTAT  BMI 24.01 kg/m²     Labs Reviewed and Include    Recent Labs   Lab 08/06/22  0342   *   CALCIUM 10.0   ALBUMIN 2.5*   PROT 7.4      K 5.0   CO2 19*      BUN 57*   CREATININE 1.7*   ALKPHOS 107   ALT 48*   AST 45*   BILITOT 6.0*     Lab Results   Component Value Date    WBC 32.21 (H) 08/06/2022    HGB 8.2 (L) 08/06/2022    HCT 28.2 (L) 08/06/2022    MCV 97 08/06/2022     08/06/2022     Recent Labs   Lab 08/05/22  0319 08/06/22  0342   FREET4 2.18* 2.95*     Lab Results   Component Value Date    HGBA1C 5.4 04/26/2021       Nutritional status:   Body mass index is 24.01 kg/m².  Lab Results   Component Value Date    ALBUMIN 2.5 (L) 08/06/2022    ALBUMIN 2.5 (L) 08/05/2022    ALBUMIN 2.3 (L) 08/05/2022     Lab Results   Component Value Date    PREALBUMIN 28 08/05/2022    PREALBUMIN 13 (L) 07/29/2022    PREALBUMIN 11 (L) 07/17/2022       Estimated Creatinine Clearance: 55.5 mL/min (A) (based on SCr of 1.7 mg/dL (H)).    Accu-Checks  Recent Labs     08/04/22  0815 08/04/22  1356 08/04/22  1801 08/04/22  2119 08/05/22  0223 08/05/22  0542 08/05/22  0745 08/05/22  1208 08/05/22  1722 08/05/22 2110   POCTGLUCOSE 108 249* 193* 175* 155* 125* 125* 151* 183* 138*       Current Medications and/or Treatments Impacting Glycemic Control  Immunotherapy:    Immunosuppressants       None          Steroids:   Hormones (From admission, onward)                Start     Stop Route Frequency Ordered    08/05/22 0900  predniSONE tablet 60 mg         -- PER NG TUBE Daily " 08/05/22 0740    08/02/22 1931  melatonin tablet 6 mg         -- OG Nightly PRN 08/02/22 1832    07/15/22 0900  vasopressin (PITRESSIN) 1 Units/mL in dextrose 5 % 100 mL infusion         -- IV Continuous 07/15/22 0900          Pressors:    Autonomic Drugs (From admission, onward)                Start     Stop Route Frequency Ordered    07/29/22 0011  EPINEPHrine (ADRENALIN) 1 mg/mL injection        Note to Pharmacy: Created by cabinet override    07/29 1214   07/29/22 0011    07/29/22 0007  EPINEPHrine 5 mg in dextrose 5% 250 mL infusion (premix)        Question Answer Comment   Begin at (in mcg/kg/min): 0.01    Titrate by: (in mcg/kg/min) 0.01    Titrate interval: (in minutes) 10    Titrate to maintain: (SBP or MAP or Cardiac Index) MAP    Greater than: (in mmHg) 65    Maximum dose: (in mcg/kg/min) 2        -- IV Continuous 07/29/22 0008    07/23/22 1400  midodrine tablet 15 mg         -- PER NG TUBE Every 8 hours 07/23/22 0944          Hyperglycemia/Diabetes Medications:   Antihyperglycemics (From admission, onward)                Start     Stop Route Frequency Ordered    08/05/22 1200  insulin aspart U-100 pen 5 Units         -- SubQ 6 times per day 08/05/22 0930    08/03/22 2100  insulin detemir U-100 pen 6 Units         -- SubQ Nightly 08/03/22 1900    07/30/22 1023  insulin aspart U-100 pen 0-5 Units         -- SubQ Every 4 hours PRN 07/30/22 0925

## 2022-08-06 NOTE — PROGRESS NOTES
08/06/2022  Estephania Martinez    Current provider:  Roslyn Ragsdale DO    Device interrogation:  TXP LVAD INTERROGATIONS 8/6/2022 8/6/2022 8/6/2022 8/6/2022 8/6/2022 8/6/2022 8/6/2022   Type HeartMate3 HeartMate3 HeartMate3 HeartMate3 HeartMate3 HeartMate3 HeartMate3   Flow 5.7 5.8 5.7 5.7 5.7 5.7 5.7   Speed 6300 6300 6300 6300 6300 6300 6300   PI 2.0 2.0 2.0 2.2 2.0 2.0 1.7   Power (Jackson) 5.4 5.4 5.4 5.4 5.4 5.4 5.3   LSL 5900 5900 5900 5900 5900 5900 5900   Low Flow Alarm - - - - - - -   Pulsatility Intermittent pulse Intermittent pulse Intermittent pulse Intermittent pulse Intermittent pulse Intermittent pulse Intermittent pulse          Rounded on Tim Richards to ensure all mechanical assist device settings (IABP or VAD) were appropriate and all parameters were within limits.  I was able to ensure all back up equipment was present, the staff had no issues, and the Perfusion Department daily rounding was complete.      For implantable VADs: Interrogation of Ventricular assist device was performed with analysis of device parameters and review of device function. I have personally reviewed the interrogation findings and agree with findings as stated.     In emergency, the nursing units have been notified to contact the perfusion department either by:  Calling [phone] from 630am to 4pm Mon thru Fri, utilizing the On-Call Finder functionality of Epic and searching for Perfusion, or by contacting the hospital  from 4pm to 630am and on weekends and asking to speak with the perfusionist on call.    2:53 PM

## 2022-08-06 NOTE — PROGRESS NOTES
Dr. Lowry notified of pt still nauseated despite Zofran administration, TF residuals previously 20 mL @ 0000, now 0 mL, trickle feeds stopped as pt stating he feels like the NGT has moved, neasuring at same level, pt also had 2 large BMs this shift,MD at bedside, ABG obtained results WNL sats 99%,, SvO2 64%, STAT chest Xray ordered, resp care done per RT.

## 2022-08-06 NOTE — PROGRESS NOTES
"Dr. Lowry notified of pt complains of feeling nauseated,per MD order placed for another 4 mg Zofran IVP, however pt refuses, stating "no more meds".   "

## 2022-08-06 NOTE — PROGRESS NOTES
[facility]  Heart Failure  Progress Note     Hospital Length of Stay: 40    Interval History:  - No significant events overnight.   - Episode of loose motion, likely associated with laxative.   - No fever in last 24 hour.   - No significant event in LVAD recorded.     Hemodynamics:   On epinephrine of 0.04, vasopressin 0.02 and lasix 5 mg/hr.   SvO2 of 64, CV 10  CO 7.4, CI 3.42 and .4     Vitals:  Temp:  [97.34 °F (36.3 °C)-99.14 °F (37.3 °C)] 97.52 °F (36.4 °C)  Pulse:  [] 104  Resp:  [8-46] 26  SpO2:  [99 %] 99 %  BP: (78-86)/(0) 84/0  Arterial Line BP: (75-94)/(58-80) 82/71    Intake/Output    Intake/Output Summary (Last 24 hours) at 8/6/2022 1030  Last data filed at 8/6/2022 1000  Gross per 24 hour   Intake 2250.31 ml   Output 3020 ml   Net -769.69 ml       Physical Exam  Gen: Trach in place, follows  command.   HEENT: Pupils equal and reactive to light  Cardio: Regular rate, point of maximal impulse not displaced,1+ radial pulses bilaterally, 1+ DP pulses bilaterally, VAD hum obstructing heart sounds  Resp: No additional sound heard.   Abd: Soft, non-tender, non-distended  Skin: Warm,  trace peripheral edema  Neuro: No gross abnormalities.   Psych: unable to assess     Labs:  Recent Labs   Lab 08/05/22  1204 08/05/22  2105 08/06/22  0342   WBC 29.08* 30.30* 32.21*   HGB 7.8* 8.2* 8.2*   HCT 25.8* 26.9* 28.2*    375 396     Recent Labs   Lab 08/05/22  1204 08/05/22  2105 08/06/22  0342    137 137   K 3.7 5.0 5.0    107 106   CO2 23 19* 19*   BUN 54* 55* 57*   CREATININE 1.3 1.5* 1.7*   * 135* 131*   CALCIUM 9.7 9.4 10.0   MG 2.1 2.2 2.2   PHOS 3.1 3.9 4.1       Micro:    Current Meds:   acetylcysteine 100 mg/ml (10%)  4 mL Nebulization Q6H    amiodarone  400 mg Oral TID    aspirin  81 mg Per OG tube Daily    ceFEPime (MAXIPIME) IVPB  2 g Intravenous Q12H    cholestyramine  1 packet Per NG tube QID    guaiFENesin 100 mg/5 ml  300 mg Per NG tube Q6H     insulin aspart U-100  5 Units Subcutaneous 6 times per day    insulin detemir U-100  6 Units Subcutaneous QHS    levalbuterol  0.63 mg Nebulization Q6H    magnesium oxide  400 mg Oral BID    methIMAzole  20 mg Per NG tube TID    metroNIDAZOLE  500 mg Oral Q8H    mexiletine  400 mg Oral Q8H    midodrine  15 mg Per NG tube Q8H    ondansetron  4 mg Intravenous Once    ondansetron  4 mg Intravenous Once    pantoprazole  40 mg Intravenous BID    potassium bicarbonate  40 mEq Per NG tube BID    predniSONE  60 mg Per NG tube Daily       Continuous Infusions:   dexmedetomidine (PRECEDEX) infusion Stopped (08/05/22 5595)    dextrose 10 % in water (D10W)      EPINEPHrine 0.04 mcg/kg/min (08/06/22 1000)    furosemide (LASIX) 2 mg/mL continuous infusion (non-titrating) 5 mg/hr (08/06/22 1000)    heparin (porcine) in 5 % dex 2,000 Units/hr (08/06/22 1000)    nicardipine Stopped (08/05/22 1733)    TPN ADULT CENTRAL LINE CUSTOM      vasopressin Stopped (08/06/22 0817)     Assessment and Plan:  Cardiogenic Shock  -Previous HM2, underwent pump exchange to HM3 on 7/13/22 complicated by worsening CS/RV failure requiring VA ECMO now decannulated  -Re-intubated on 7/29/22 due to hypercapnic resp failure  -Vaso stopped in the AM.   -On epinephrine 0.04 and lasix 5 currently.      LVAD  TXP LVAD INTERROGATIONS 7/31/2022 7/31/2022 7/31/2022 7/31/2022 7/31/2022 7/31/2022 7/31/2022   Type HeartMate3 HeartMate3 HeartMate3 HeartMate3 HeartMate3 HeartMate3 HeartMate3   Flow 5.3 5.5 5.3 5.3 5.3 5.4 5.5   Speed 6200 6200 6200 6200 6200 6200 6200   PI 3.2 3.3 3.3 3.3 3.1 3.2 3.1   Power (Jackson) 5.2 5.1 5.1 5.2 5.1 5.1 5.1   LSL 5800 - - - 5800 - -   Low Flow Alarm - - - - - - -   Pulsatility Intermittent pulse - - - Intermittent pulse - -     Fever:   Also, has leucocytosis, although on steroid also.   Have discussed with ID team. On board.   Plan to continue antibiotics for 14 days.   If spike fever again, repeat Blood  culture.      History of ventricular tachycardia/Episode of A flutter 2:1 block  Patient experienced an episode of VT on 6/30 and experienced an ICD shock thought to be related to hypokalemia (K of 3.1 on 6/30)  - Aggressively replete K, keep K>4 and Mg>2  - Continue mexiletine PO.  - Amio gtt transitioned to PO.     COVID-19 virus infection  -Previously hypoxic then recovered  -ID was consulted, recommended Remdesivir, course completed     Elevated serum lactate dehydrogenase (LDH)  S/p HM3 exchange for HM2 pump thrombosis  Continue to trend LDH.      amiodarone induced hypothyrodism initially, now hyperthyroidism  -Endocrine team on board.  -On prednisone and methimazole. Prednisone  60 mg.   -Continue to monitor free T4.  - Medications has been changes as per Endocrinology team.      Chronic combined systolic and diastolic heart failure  ADHF  Underwent HM III upgrade on 7/13/22, currently on VA ECMO  Currently on Epi gtt, Vasopressin  Currently on Precedex  gtt for sedation  Being treated for sepsis.   On cefepime and flagyl.  Currently trach ed. Trach placed on 8/4. (reintubarted 7/29)  Chest tube removal, bronch, and old driveline removed 7/22      Scot Card MD, FACP  Cardiovascular Disease Fellow PGY4  [facility]

## 2022-08-07 LAB
ALBUMIN SERPL BCP-MCNC: 2.4 G/DL (ref 3.5–5.2)
ALBUMIN SERPL BCP-MCNC: 2.4 G/DL (ref 3.5–5.2)
ALBUMIN SERPL BCP-MCNC: 2.5 G/DL (ref 3.5–5.2)
ALLENS TEST: ABNORMAL
ALP SERPL-CCNC: 109 U/L (ref 55–135)
ALP SERPL-CCNC: 114 U/L (ref 55–135)
ALP SERPL-CCNC: 116 U/L (ref 55–135)
ALT SERPL W/O P-5'-P-CCNC: 50 U/L (ref 10–44)
ALT SERPL W/O P-5'-P-CCNC: 50 U/L (ref 10–44)
ALT SERPL W/O P-5'-P-CCNC: 52 U/L (ref 10–44)
ANION GAP SERPL CALC-SCNC: 11 MMOL/L (ref 8–16)
ANION GAP SERPL CALC-SCNC: 12 MMOL/L (ref 8–16)
ANION GAP SERPL CALC-SCNC: 9 MMOL/L (ref 8–16)
ANISOCYTOSIS BLD QL SMEAR: SLIGHT
ANISOCYTOSIS BLD QL SMEAR: SLIGHT
APTT BLDCRRT: 43.4 SEC (ref 21–32)
AST SERPL-CCNC: 36 U/L (ref 10–40)
AST SERPL-CCNC: 37 U/L (ref 10–40)
AST SERPL-CCNC: 37 U/L (ref 10–40)
BASOPHILS # BLD AUTO: 0.08 K/UL (ref 0–0.2)
BASOPHILS NFR BLD: 0 % (ref 0–1.9)
BASOPHILS NFR BLD: 0 % (ref 0–1.9)
BASOPHILS NFR BLD: 0.2 % (ref 0–1.9)
BILIRUB SERPL-MCNC: 5.6 MG/DL (ref 0.1–1)
BILIRUB SERPL-MCNC: 6 MG/DL (ref 0.1–1)
BILIRUB SERPL-MCNC: 6.1 MG/DL (ref 0.1–1)
BUN SERPL-MCNC: 60 MG/DL (ref 6–20)
BUN SERPL-MCNC: 63 MG/DL (ref 6–20)
BUN SERPL-MCNC: 65 MG/DL (ref 6–20)
CALCIUM SERPL-MCNC: 9.4 MG/DL (ref 8.7–10.5)
CALCIUM SERPL-MCNC: 9.5 MG/DL (ref 8.7–10.5)
CALCIUM SERPL-MCNC: 9.8 MG/DL (ref 8.7–10.5)
CHLORIDE SERPL-SCNC: 102 MMOL/L (ref 95–110)
CHLORIDE SERPL-SCNC: 103 MMOL/L (ref 95–110)
CHLORIDE SERPL-SCNC: 105 MMOL/L (ref 95–110)
CO2 SERPL-SCNC: 22 MMOL/L (ref 23–29)
CO2 SERPL-SCNC: 23 MMOL/L (ref 23–29)
CO2 SERPL-SCNC: 25 MMOL/L (ref 23–29)
CREAT SERPL-MCNC: 1.9 MG/DL (ref 0.5–1.4)
CREAT SERPL-MCNC: 2 MG/DL (ref 0.5–1.4)
CREAT SERPL-MCNC: 2 MG/DL (ref 0.5–1.4)
DELSYS: ABNORMAL
DIFFERENTIAL METHOD: ABNORMAL
EOSINOPHIL # BLD AUTO: 0.1 K/UL (ref 0–0.5)
EOSINOPHIL NFR BLD: 0 % (ref 0–8)
EOSINOPHIL NFR BLD: 0 % (ref 0–8)
EOSINOPHIL NFR BLD: 0.3 % (ref 0–8)
ERYTHROCYTE [DISTWIDTH] IN BLOOD BY AUTOMATED COUNT: 19.5 % (ref 11.5–14.5)
ERYTHROCYTE [DISTWIDTH] IN BLOOD BY AUTOMATED COUNT: 19.6 % (ref 11.5–14.5)
ERYTHROCYTE [DISTWIDTH] IN BLOOD BY AUTOMATED COUNT: 19.8 % (ref 11.5–14.5)
ERYTHROCYTE [SEDIMENTATION RATE] IN BLOOD BY WESTERGREN METHOD: 20 MM/H
EST. GFR  (NO RACE VARIABLE): 38.7 ML/MIN/1.73 M^2
EST. GFR  (NO RACE VARIABLE): 38.7 ML/MIN/1.73 M^2
EST. GFR  (NO RACE VARIABLE): 41.1 ML/MIN/1.73 M^2
FACT X PPP CHRO-ACNC: 0.32 IU/ML (ref 0.3–0.7)
FACT X PPP CHRO-ACNC: 0.33 IU/ML (ref 0.3–0.7)
FACT X PPP CHRO-ACNC: 0.34 IU/ML (ref 0.3–0.7)
FACT X PPP CHRO-ACNC: 0.4 IU/ML (ref 0.3–0.7)
FIBRINOGEN PPP-MCNC: 506 MG/DL (ref 182–400)
FIO2: 30
GIANT PLATELETS BLD QL SMEAR: PRESENT
GLUCOSE SERPL-MCNC: 152 MG/DL (ref 70–110)
GLUCOSE SERPL-MCNC: 190 MG/DL (ref 70–110)
GLUCOSE SERPL-MCNC: 190 MG/DL (ref 70–110)
HCO3 UR-SCNC: 23.2 MMOL/L (ref 24–28)
HCO3 UR-SCNC: 27.1 MMOL/L (ref 24–28)
HCT VFR BLD AUTO: 28.7 % (ref 40–54)
HCT VFR BLD AUTO: 29.7 % (ref 40–54)
HCT VFR BLD AUTO: 30 % (ref 40–54)
HCT VFR BLD CALC: 31 %PCV (ref 36–54)
HCT VFR BLD CALC: 32 %PCV (ref 36–54)
HGB BLD-MCNC: 8.5 G/DL (ref 14–18)
HGB BLD-MCNC: 8.6 G/DL (ref 14–18)
HGB BLD-MCNC: 8.9 G/DL (ref 14–18)
HYPOCHROMIA BLD QL SMEAR: ABNORMAL
IMM GRANULOCYTES # BLD AUTO: 1.63 K/UL (ref 0–0.04)
IMM GRANULOCYTES # BLD AUTO: ABNORMAL K/UL (ref 0–0.04)
IMM GRANULOCYTES # BLD AUTO: ABNORMAL K/UL (ref 0–0.04)
IMM GRANULOCYTES NFR BLD AUTO: 4.6 % (ref 0–0.5)
IMM GRANULOCYTES NFR BLD AUTO: ABNORMAL % (ref 0–0.5)
IMM GRANULOCYTES NFR BLD AUTO: ABNORMAL % (ref 0–0.5)
INR PPP: 1.2 (ref 0.8–1.2)
INR PPP: 1.2 (ref 0.8–1.2)
INR PPP: 1.3 (ref 0.8–1.2)
LDH SERPL L TO P-CCNC: 257 U/L (ref 110–260)
LYMPHOCYTES # BLD AUTO: 1 K/UL (ref 1–4.8)
LYMPHOCYTES NFR BLD: 1 % (ref 18–48)
LYMPHOCYTES NFR BLD: 2 % (ref 18–48)
LYMPHOCYTES NFR BLD: 2.9 % (ref 18–48)
MAGNESIUM SERPL-MCNC: 2.3 MG/DL (ref 1.6–2.6)
MAGNESIUM SERPL-MCNC: 2.6 MG/DL (ref 1.6–2.6)
MAGNESIUM SERPL-MCNC: 2.6 MG/DL (ref 1.6–2.6)
MCH RBC QN AUTO: 28.4 PG (ref 27–31)
MCH RBC QN AUTO: 28.6 PG (ref 27–31)
MCH RBC QN AUTO: 28.7 PG (ref 27–31)
MCHC RBC AUTO-ENTMCNC: 29 G/DL (ref 32–36)
MCHC RBC AUTO-ENTMCNC: 29.6 G/DL (ref 32–36)
MCHC RBC AUTO-ENTMCNC: 29.7 G/DL (ref 32–36)
MCV RBC AUTO: 97 FL (ref 82–98)
MCV RBC AUTO: 97 FL (ref 82–98)
MCV RBC AUTO: 98 FL (ref 82–98)
METAMYELOCYTES NFR BLD MANUAL: 2 %
MIN VOL: 15.8
MODE: ABNORMAL
MONOCYTES # BLD AUTO: 1.9 K/UL (ref 0.3–1)
MONOCYTES NFR BLD: 2 % (ref 4–15)
MONOCYTES NFR BLD: 2 % (ref 4–15)
MONOCYTES NFR BLD: 5.3 % (ref 4–15)
MYELOCYTES NFR BLD MANUAL: 1 %
MYELOCYTES NFR BLD MANUAL: 2 %
NEUTROPHILS # BLD AUTO: 30.6 K/UL (ref 1.8–7.7)
NEUTROPHILS NFR BLD: 86.7 % (ref 38–73)
NEUTROPHILS NFR BLD: 91 % (ref 38–73)
NEUTROPHILS NFR BLD: 94 % (ref 38–73)
NEUTS BAND NFR BLD MANUAL: 3 %
NRBC BLD-RTO: 0 /100 WBC
OVALOCYTES BLD QL SMEAR: ABNORMAL
PCO2 BLDA: 37.3 MMHG (ref 35–45)
PCO2 BLDA: 39.4 MMHG (ref 35–45)
PCO2 BLDA: 42.8 MMHG (ref 35–45)
PCO2 BLDA: 77.9 MMHG (ref 35–45)
PEEP: 5
PH SMN: 7.08 [PH] (ref 7.35–7.45)
PH SMN: 7.41 [PH] (ref 7.35–7.45)
PH SMN: 7.41 [PH] (ref 7.35–7.45)
PH SMN: 7.45 [PH] (ref 7.35–7.45)
PHOSPHATE SERPL-MCNC: 3 MG/DL (ref 2.7–4.5)
PHOSPHATE SERPL-MCNC: 3.2 MG/DL (ref 2.7–4.5)
PHOSPHATE SERPL-MCNC: 3.5 MG/DL (ref 2.7–4.5)
PIP: 24
PLATELET # BLD AUTO: 370 K/UL (ref 150–450)
PLATELET # BLD AUTO: 373 K/UL (ref 150–450)
PLATELET # BLD AUTO: 393 K/UL (ref 150–450)
PLATELET BLD QL SMEAR: ABNORMAL
PMV BLD AUTO: 11.7 FL (ref 9.2–12.9)
PMV BLD AUTO: 11.8 FL (ref 9.2–12.9)
PMV BLD AUTO: 12.1 FL (ref 9.2–12.9)
PO2 BLDA: 115 MMHG (ref 80–100)
PO2 BLDA: 35 MMHG (ref 40–60)
PO2 BLDA: 43 MMHG (ref 40–60)
PO2 BLDA: 50 MMHG (ref 40–60)
POC BE: -1 MMOL/L
POC BE: -7 MMOL/L
POC BE: 3 MMOL/L
POC BE: 3 MMOL/L
POC SATURATED O2: 68 % (ref 95–100)
POC SATURATED O2: 68 % (ref 95–100)
POC SATURATED O2: 79 % (ref 95–100)
POC SATURATED O2: 99 % (ref 95–100)
POC TCO2: 26 MMOL/L (ref 24–29)
POC TCO2: 28 MMOL/L (ref 23–27)
POCT GLUCOSE: 142 MG/DL (ref 70–110)
POCT GLUCOSE: 159 MG/DL (ref 70–110)
POCT GLUCOSE: 165 MG/DL (ref 70–110)
POCT GLUCOSE: 200 MG/DL (ref 70–110)
POCT GLUCOSE: 205 MG/DL (ref 70–110)
POCT GLUCOSE: 210 MG/DL (ref 70–110)
POCT GLUCOSE: 228 MG/DL (ref 70–110)
POIKILOCYTOSIS BLD QL SMEAR: SLIGHT
POLYCHROMASIA BLD QL SMEAR: ABNORMAL
POTASSIUM SERPL-SCNC: 3.7 MMOL/L (ref 3.5–5.1)
POTASSIUM SERPL-SCNC: 4.7 MMOL/L (ref 3.5–5.1)
POTASSIUM SERPL-SCNC: 4.9 MMOL/L (ref 3.5–5.1)
PROT SERPL-MCNC: 6.7 G/DL (ref 6–8.4)
PROT SERPL-MCNC: 7.2 G/DL (ref 6–8.4)
PROT SERPL-MCNC: 7.4 G/DL (ref 6–8.4)
PROTHROMBIN TIME: 12.4 SEC (ref 9–12.5)
PROTHROMBIN TIME: 12.7 SEC (ref 9–12.5)
PROTHROMBIN TIME: 13.2 SEC (ref 9–12.5)
RBC # BLD AUTO: 2.96 M/UL (ref 4.6–6.2)
RBC # BLD AUTO: 3.03 M/UL (ref 4.6–6.2)
RBC # BLD AUTO: 3.11 M/UL (ref 4.6–6.2)
SAMPLE: ABNORMAL
SITE: ABNORMAL
SODIUM SERPL-SCNC: 137 MMOL/L (ref 136–145)
SODIUM SERPL-SCNC: 137 MMOL/L (ref 136–145)
SODIUM SERPL-SCNC: 138 MMOL/L (ref 136–145)
STOMATOCYTES BLD QL SMEAR: PRESENT
T4 FREE SERPL-MCNC: 2.51 NG/DL (ref 0.71–1.51)
TARGETS BLD QL SMEAR: ABNORMAL
VT: 550
WBC # BLD AUTO: 32.09 K/UL (ref 3.9–12.7)
WBC # BLD AUTO: 32.92 K/UL (ref 3.9–12.7)
WBC # BLD AUTO: 35.24 K/UL (ref 3.9–12.7)
WBC TOXIC VACUOLES BLD QL SMEAR: PRESENT

## 2022-08-07 PROCEDURE — 25000003 PHARM REV CODE 250

## 2022-08-07 PROCEDURE — 85014 HEMATOCRIT: CPT

## 2022-08-07 PROCEDURE — 85027 COMPLETE CBC AUTOMATED: CPT | Performed by: THORACIC SURGERY (CARDIOTHORACIC VASCULAR SURGERY)

## 2022-08-07 PROCEDURE — 63600175 PHARM REV CODE 636 W HCPCS: Performed by: STUDENT IN AN ORGANIZED HEALTH CARE EDUCATION/TRAINING PROGRAM

## 2022-08-07 PROCEDURE — 83615 LACTATE (LD) (LDH) ENZYME: CPT | Performed by: STUDENT IN AN ORGANIZED HEALTH CARE EDUCATION/TRAINING PROGRAM

## 2022-08-07 PROCEDURE — 25000242 PHARM REV CODE 250 ALT 637 W/ HCPCS: Performed by: THORACIC SURGERY (CARDIOTHORACIC VASCULAR SURGERY)

## 2022-08-07 PROCEDURE — 85025 COMPLETE CBC W/AUTO DIFF WBC: CPT | Performed by: THORACIC SURGERY (CARDIOTHORACIC VASCULAR SURGERY)

## 2022-08-07 PROCEDURE — 99900035 HC TECH TIME PER 15 MIN (STAT)

## 2022-08-07 PROCEDURE — 25000242 PHARM REV CODE 250 ALT 637 W/ HCPCS

## 2022-08-07 PROCEDURE — A4217 STERILE WATER/SALINE, 500 ML: HCPCS | Performed by: STUDENT IN AN ORGANIZED HEALTH CARE EDUCATION/TRAINING PROGRAM

## 2022-08-07 PROCEDURE — B4185 PARENTERAL SOL 10 GM LIPIDS: HCPCS | Performed by: STUDENT IN AN ORGANIZED HEALTH CARE EDUCATION/TRAINING PROGRAM

## 2022-08-07 PROCEDURE — 27000644 HC VENT IN-LINE ADAPTER

## 2022-08-07 PROCEDURE — 99291 PR CRITICAL CARE, E/M 30-74 MINUTES: ICD-10-PCS | Mod: ,,, | Performed by: INTERNAL MEDICINE

## 2022-08-07 PROCEDURE — 82803 BLOOD GASES ANY COMBINATION: CPT

## 2022-08-07 PROCEDURE — 99291 CRITICAL CARE FIRST HOUR: CPT | Mod: ,,, | Performed by: INTERNAL MEDICINE

## 2022-08-07 PROCEDURE — 93750 INTERROGATION VAD IN PERSON: CPT | Mod: ,,, | Performed by: INTERNAL MEDICINE

## 2022-08-07 PROCEDURE — 25000003 PHARM REV CODE 250: Performed by: STUDENT IN AN ORGANIZED HEALTH CARE EDUCATION/TRAINING PROGRAM

## 2022-08-07 PROCEDURE — 82330 ASSAY OF CALCIUM: CPT

## 2022-08-07 PROCEDURE — 84295 ASSAY OF SERUM SODIUM: CPT

## 2022-08-07 PROCEDURE — 94640 AIRWAY INHALATION TREATMENT: CPT

## 2022-08-07 PROCEDURE — 84132 ASSAY OF SERUM POTASSIUM: CPT

## 2022-08-07 PROCEDURE — 85730 THROMBOPLASTIN TIME PARTIAL: CPT | Performed by: THORACIC SURGERY (CARDIOTHORACIC VASCULAR SURGERY)

## 2022-08-07 PROCEDURE — 84439 ASSAY OF FREE THYROXINE: CPT | Performed by: THORACIC SURGERY (CARDIOTHORACIC VASCULAR SURGERY)

## 2022-08-07 PROCEDURE — 99900026 HC AIRWAY MAINTENANCE (STAT)

## 2022-08-07 PROCEDURE — 25000003 PHARM REV CODE 250: Performed by: PHYSICIAN ASSISTANT

## 2022-08-07 PROCEDURE — 27000248 HC VAD-ADDITIONAL DAY

## 2022-08-07 PROCEDURE — 80053 COMPREHEN METABOLIC PANEL: CPT | Mod: 91 | Performed by: THORACIC SURGERY (CARDIOTHORACIC VASCULAR SURGERY)

## 2022-08-07 PROCEDURE — 82800 BLOOD PH: CPT

## 2022-08-07 PROCEDURE — 85384 FIBRINOGEN ACTIVITY: CPT | Performed by: THORACIC SURGERY (CARDIOTHORACIC VASCULAR SURGERY)

## 2022-08-07 PROCEDURE — 99232 PR SUBSEQUENT HOSPITAL CARE,LEVL II: ICD-10-PCS | Mod: ,,, | Performed by: INTERNAL MEDICINE

## 2022-08-07 PROCEDURE — 63600175 PHARM REV CODE 636 W HCPCS: Mod: JG | Performed by: STUDENT IN AN ORGANIZED HEALTH CARE EDUCATION/TRAINING PROGRAM

## 2022-08-07 PROCEDURE — 63600175 PHARM REV CODE 636 W HCPCS

## 2022-08-07 PROCEDURE — 94003 VENT MGMT INPAT SUBQ DAY: CPT

## 2022-08-07 PROCEDURE — 83735 ASSAY OF MAGNESIUM: CPT | Mod: 91 | Performed by: THORACIC SURGERY (CARDIOTHORACIC VASCULAR SURGERY)

## 2022-08-07 PROCEDURE — 37799 UNLISTED PX VASCULAR SURGERY: CPT

## 2022-08-07 PROCEDURE — 99232 SBSQ HOSP IP/OBS MODERATE 35: CPT | Mod: ,,, | Performed by: INTERNAL MEDICINE

## 2022-08-07 PROCEDURE — 82565 ASSAY OF CREATININE: CPT

## 2022-08-07 PROCEDURE — 84100 ASSAY OF PHOSPHORUS: CPT | Mod: 91 | Performed by: THORACIC SURGERY (CARDIOTHORACIC VASCULAR SURGERY)

## 2022-08-07 PROCEDURE — 85520 HEPARIN ASSAY: CPT | Performed by: THORACIC SURGERY (CARDIOTHORACIC VASCULAR SURGERY)

## 2022-08-07 PROCEDURE — 85610 PROTHROMBIN TIME: CPT | Performed by: THORACIC SURGERY (CARDIOTHORACIC VASCULAR SURGERY)

## 2022-08-07 PROCEDURE — 94761 N-INVAS EAR/PLS OXIMETRY MLT: CPT

## 2022-08-07 PROCEDURE — 20000000 HC ICU ROOM

## 2022-08-07 PROCEDURE — 25000003 PHARM REV CODE 250: Performed by: THORACIC SURGERY (CARDIOTHORACIC VASCULAR SURGERY)

## 2022-08-07 PROCEDURE — 99233 SBSQ HOSP IP/OBS HIGH 50: CPT | Mod: ,,, | Performed by: INTERNAL MEDICINE

## 2022-08-07 PROCEDURE — C9113 INJ PANTOPRAZOLE SODIUM, VIA: HCPCS

## 2022-08-07 PROCEDURE — 27000221 HC OXYGEN, UP TO 24 HOURS

## 2022-08-07 PROCEDURE — 85007 BL SMEAR W/DIFF WBC COUNT: CPT | Performed by: THORACIC SURGERY (CARDIOTHORACIC VASCULAR SURGERY)

## 2022-08-07 PROCEDURE — 93750 PR INTERROGATE VENT ASSIST DEV, IN PERSON, W PHYSICIAN ANALYSIS: ICD-10-PCS | Mod: ,,, | Performed by: INTERNAL MEDICINE

## 2022-08-07 PROCEDURE — 99233 PR SUBSEQUENT HOSPITAL CARE,LEVL III: ICD-10-PCS | Mod: ,,, | Performed by: INTERNAL MEDICINE

## 2022-08-07 RX ADMIN — PREDNISONE 60 MG: 20 TABLET ORAL at 08:08

## 2022-08-07 RX ADMIN — GUAIFENESIN 300 MG: 100 SOLUTION ORAL at 05:08

## 2022-08-07 RX ADMIN — AMIODARONE HYDROCHLORIDE 400 MG: 200 TABLET ORAL at 03:08

## 2022-08-07 RX ADMIN — CHOLESTYRAMINE 4 G: 4 POWDER, FOR SUSPENSION ORAL at 04:08

## 2022-08-07 RX ADMIN — Medication 400 MG: at 08:08

## 2022-08-07 RX ADMIN — MEXILETINE HYDROCHLORIDE 400 MG: 200 CAPSULE ORAL at 05:08

## 2022-08-07 RX ADMIN — PANTOPRAZOLE SODIUM 40 MG: 40 INJECTION, POWDER, FOR SOLUTION INTRAVENOUS at 08:08

## 2022-08-07 RX ADMIN — CHOLESTYRAMINE 4 G: 4 POWDER, FOR SUSPENSION ORAL at 01:08

## 2022-08-07 RX ADMIN — LEVALBUTEROL HYDROCHLORIDE 0.63 MG: 0.63 SOLUTION RESPIRATORY (INHALATION) at 12:08

## 2022-08-07 RX ADMIN — METRONIDAZOLE 500 MG: 500 TABLET ORAL at 05:08

## 2022-08-07 RX ADMIN — GUAIFENESIN 300 MG: 100 SOLUTION ORAL at 11:08

## 2022-08-07 RX ADMIN — INSULIN ASPART 5 UNITS: 100 INJECTION, SOLUTION INTRAVENOUS; SUBCUTANEOUS at 08:08

## 2022-08-07 RX ADMIN — POTASSIUM BICARBONATE 40 MEQ: 391 TABLET, EFFERVESCENT ORAL at 08:08

## 2022-08-07 RX ADMIN — HYDROXYZINE HYDROCHLORIDE 25 MG: 25 TABLET ORAL at 01:08

## 2022-08-07 RX ADMIN — MIDODRINE HYDROCHLORIDE 15 MG: 5 TABLET ORAL at 09:08

## 2022-08-07 RX ADMIN — INSULIN ASPART 2 UNITS: 100 INJECTION, SOLUTION INTRAVENOUS; SUBCUTANEOUS at 03:08

## 2022-08-07 RX ADMIN — ACETYLCYSTEINE 4 ML: 100 SOLUTION ORAL; RESPIRATORY (INHALATION) at 06:08

## 2022-08-07 RX ADMIN — HYDROXYZINE HYDROCHLORIDE 25 MG: 25 TABLET ORAL at 08:08

## 2022-08-07 RX ADMIN — METHIMAZOLE 20 MG: 10 TABLET ORAL at 03:08

## 2022-08-07 RX ADMIN — HEPARIN SODIUM 2000 UNITS/HR: 5000 INJECTION INTRAVENOUS; SUBCUTANEOUS at 08:08

## 2022-08-07 RX ADMIN — ALTEPLASE 2 MG: 2.2 INJECTION, POWDER, LYOPHILIZED, FOR SOLUTION INTRAVENOUS at 05:08

## 2022-08-07 RX ADMIN — Medication 6 MG: at 08:08

## 2022-08-07 RX ADMIN — INSULIN ASPART 5 UNITS: 100 INJECTION, SOLUTION INTRAVENOUS; SUBCUTANEOUS at 11:08

## 2022-08-07 RX ADMIN — MAGNESIUM SULFATE HEPTAHYDRATE: 500 INJECTION, SOLUTION INTRAMUSCULAR; INTRAVENOUS at 09:08

## 2022-08-07 RX ADMIN — MEXILETINE HYDROCHLORIDE 400 MG: 200 CAPSULE ORAL at 01:08

## 2022-08-07 RX ADMIN — METHIMAZOLE 20 MG: 10 TABLET ORAL at 08:08

## 2022-08-07 RX ADMIN — METRONIDAZOLE 500 MG: 500 TABLET ORAL at 01:08

## 2022-08-07 RX ADMIN — AMIODARONE HYDROCHLORIDE 400 MG: 200 TABLET ORAL at 09:08

## 2022-08-07 RX ADMIN — Medication 0.04 MCG/KG/MIN: at 08:08

## 2022-08-07 RX ADMIN — INSULIN DETEMIR 6 UNITS: 100 INJECTION, SOLUTION SUBCUTANEOUS at 08:08

## 2022-08-07 RX ADMIN — CHOLESTYRAMINE 4 G: 4 POWDER, FOR SUSPENSION ORAL at 08:08

## 2022-08-07 RX ADMIN — INSULIN ASPART 1 UNITS: 100 INJECTION, SOLUTION INTRAVENOUS; SUBCUTANEOUS at 08:08

## 2022-08-07 RX ADMIN — METRONIDAZOLE 500 MG: 500 TABLET ORAL at 09:08

## 2022-08-07 RX ADMIN — ACETYLCYSTEINE 4 ML: 100 SOLUTION ORAL; RESPIRATORY (INHALATION) at 07:08

## 2022-08-07 RX ADMIN — ACETYLCYSTEINE 4 ML: 100 SOLUTION ORAL; RESPIRATORY (INHALATION) at 12:08

## 2022-08-07 RX ADMIN — POTASSIUM BICARBONATE 40 MEQ: 391 TABLET, EFFERVESCENT ORAL at 05:08

## 2022-08-07 RX ADMIN — LEVALBUTEROL HYDROCHLORIDE 0.63 MG: 0.63 SOLUTION RESPIRATORY (INHALATION) at 06:08

## 2022-08-07 RX ADMIN — LIDOCAINE HYDROCHLORIDE 15 ML: 20 SOLUTION ORAL; TOPICAL at 01:08

## 2022-08-07 RX ADMIN — LEVALBUTEROL HYDROCHLORIDE 0.63 MG: 0.63 SOLUTION RESPIRATORY (INHALATION) at 07:08

## 2022-08-07 RX ADMIN — CEFEPIME 2 G: 2 INJECTION, POWDER, FOR SOLUTION INTRAVENOUS at 09:08

## 2022-08-07 RX ADMIN — MIDODRINE HYDROCHLORIDE 15 MG: 5 TABLET ORAL at 01:08

## 2022-08-07 RX ADMIN — INSULIN ASPART 5 UNITS: 100 INJECTION, SOLUTION INTRAVENOUS; SUBCUTANEOUS at 03:08

## 2022-08-07 RX ADMIN — AMIODARONE HYDROCHLORIDE 400 MG: 200 TABLET ORAL at 08:08

## 2022-08-07 RX ADMIN — HYDROMORPHONE HYDROCHLORIDE 1 MG: 1 INJECTION, SOLUTION INTRAMUSCULAR; INTRAVENOUS; SUBCUTANEOUS at 12:08

## 2022-08-07 RX ADMIN — ASPIRIN 81 MG CHEWABLE TABLET 81 MG: 81 TABLET CHEWABLE at 08:08

## 2022-08-07 RX ADMIN — ALUMINUM HYDROXIDE, MAGNESIUM HYDROXIDE, AND SIMETHICONE 30 ML: 200; 200; 20 SUSPENSION ORAL at 01:08

## 2022-08-07 RX ADMIN — MIDODRINE HYDROCHLORIDE 15 MG: 5 TABLET ORAL at 05:08

## 2022-08-07 RX ADMIN — SMOFLIPID 250 ML: 6; 6; 5; 3 INJECTION, EMULSION INTRAVENOUS at 09:08

## 2022-08-07 RX ADMIN — MEXILETINE HYDROCHLORIDE 400 MG: 200 CAPSULE ORAL at 09:08

## 2022-08-07 NOTE — PLAN OF CARE
"      SICU PLAN OF CARE NOTE    Dx: Left ventricular assist device (LVAD) complication    Shift Events: Epi decreased to 0.03. No nausea or abd discomfort, TF restarted and tolerating at 10 ml/hr (don't adv rate per Dr. Kaufman) and TPN to continue as previous. Tolerated Spont vent trial with no desat or apnea noted but pt c/o increase in work of breathing and requested to be placed back to AC/VC. CVP 9-7 with adequate UOP on lasix gtt. SvO2 60. Cr increase to 2.0. ID consulted for elevated WBC without fever, no new orders at this time. Heparin gtt still therapeutic.     Goals of Care: MAP 65-85, K >4.5, Mg >2.5    Neuro: AAO x4, Follows Commands, and Moves All Extremities    Vital Signs: BP (!) 72/0 (BP Location: Right arm, Patient Position: Lying)   Pulse 80   Temp 96.98 °F (36.1 °C)   Resp (!) 28   Ht 6' 1" (1.854 m)   Wt 83.5 kg (184 lb)   SpO2 98%   BMI 24.28 kg/m²     Respiratory: Ventilator AC/VC 20, 30% 5 PEEP (Spont x 2 hrs)    Diet: Tube Feeds and TPN TF @ trickle 10 ml/hr (dont adv per )    Gtts: Epinephrine, Lasix, and Heparin TPN    Urine Output: Urinary Catheter 100-150 cc/hour (1665 ml/shift)    Drains: NGT: no output- TFs resumed @ 10 ml/hr    VAD HM3   Speed: 6300 RPM    Flow: 5.7-5.8    PI: 1.7-2.2    Power: 5.4-5.5    Wound Vac Right groin: CDI with no output this shift     Labs/Accuchecks: Q8 Chem/CBC. Q6hr AntiXa, and Accuchecks Q4hr    Skin: No new skin breakdown noted. Daily RN driveline dressing changes with scant green drainage noted. See assessment for details and WC orders. Heels elevated on pillow. TQ2 with minimal assist. Immerse Specialty bed in use and bed functioning properly.       "

## 2022-08-07 NOTE — PROGRESS NOTES
[facility]  Infectious Disease  Progress Note    Patient Name: Tim Richards  MRN: [id number]  Admission Date: 6/27/2022  Length of Stay: 41 days  Attending Physician: Roslyn Ragsdale DO  Primary Care Provider: Deyanira Booth MD    Isolation Status: No active isolations  Assessment/Plan:      Leukocytosis  Leukocytosis with associated fever up to 100.9 F status post creation of tracheostomy on 8/4/22. Blood cultures this admission have been unrevealing. Only notable pathogen isolated was Klebsiella aerogenes from sputum for which patient has been treated with cefepime. Metronidazole was added for anaerobic coverage. 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 Old DLES inspected at bedside today and found to be dry with no drainage. Newer left sided DLES bandage seen dry, clean, and intact with no drainage reported by nursing. DLES cultures performed on 7/25 showed no growth. CT c/a/p 7/29 with no evidence of infection. 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 First dose 80 mg now tapered down to 60 mg. No evidence of new infectious process.     Recommendations  - Continue IV cefepime and metronidazole for originally anticipated 14 day pneumonia course; stop date 8/9/22   - suspect leukocytosis is multifactorial in setting of recent surgeries, high dose steroids, thyrotoxicosis  - trend WBC, will initiate further infectious work up if patient develops any additional clinical change  - If patient will be on glucocorticoid dose equivalent to ?20 mg of prednisone daily for one month or longer then would recommend starting PJP prophylaxis with PO TMP//80 mg daily or renally dosed by pharmacy     Will follow      Anticipated Disposition: TBD    Thank you for your consult. I will follow-up with patient. Please contact us if you have any additional questions.    Nelly Mullins DO  Critical Care Infectious  "Disease    Time: 35 minutes   50% of time spent on face-to-face counseling and coordination of care. Counseling included review of test results, diagnosis, and treatment plan with patient and/or family.        Subjective:     Principal Problem:Left ventricular assist device (LVAD) complication    HPI: A 56-year-old man with HFrEF-10%, s/p VAD HM2 (March 2018), ICD, VT on amiodarone who developed malaise, anorexia, SOB, dark urine, and soft stools 3-4 days prior to admission. [place] [facility] [place] which time he tested positive for Covid-19 infection. He was admitted for further management and started on Remdesivir treatment protocol. Since admission, he feels significantly improved with bowel movements returning to normal and the shortness of breath subsiding.     Infectious Diseases consulted for "covid infection, acute. Patient with LVAD"    ID reconsulted 7/21 for "sepsis and consideration for fungemia". Since pt was last seen by ID, patient underwent AV repair, redo sternotomy, explant of old lvad HM2 with implantation of HM3, and placed on VA-ECMO, takeback 7/14 for mediastinal washout/hematoma, and washout, sternal closure, creation of moises-pericardium (gerotex membrane) and wound vac placement on 7/15. Decannulated ECMO on 7/19 with subsequent fever and hypotension. Pt was placed on broad spectrum abx. Blcx obtained from 7/20 ngtd. S/p bronch on 7/20 - cx with normal respiratory sachin.     ID reconsulted 8/5 for "On cefepime and flagyl day 10 for klebsiella. Spiking fever. Has LVAD. WBC trending up (also on steroid though). Tip culture from CVC in process."      Interval History: called back for increasing WBC. Pt overall appears well. Per bedside RN, DLES drainage significantly improved compared to prior, scant drainage present on dressing. Pt is afebrile, complains of discomfort w/ NG, otherwise no complaints. RN notes some loose BM after bowel regimen. Remains on high dose steroids for amio induced " thyrotoxicosis    Review of Systems   Unable to perform ROS: Acuity of condition   Objective:     Vital Signs (Most Recent):  Temp: 97.16 °F (36.2 °C) (08/07/22 1530)  Pulse: 81 (08/07/22 1530)  Resp: (!) 23 (08/07/22 1530)  BP: (!) 72/0 (08/07/22 1515)  SpO2: 98 % (08/07/22 1515)   Vital Signs (24h Range):  Temp:  [96.08 °F (35.6 °C)-98.24 °F (36.8 °C)] 97.16 °F (36.2 °C)  Pulse:  [] 81  Resp:  [17-95] 23  SpO2:  [95 %-100 %] 98 %  BP: (72-84)/(0) 72/0  Arterial Line BP: (74-92)/(64-81) 85/71     Weight: 83.5 kg (184 lb)  Body mass index is 24.28 kg/m².    Estimated Creatinine Clearance: 47.2 mL/min (A) (based on SCr of 2 mg/dL (H)).    Physical Exam  Constitutional:       General: He is not in acute distress.     Appearance: Normal appearance. He is well-developed. He is not ill-appearing or diaphoretic.   HENT:      Head: Normocephalic and atraumatic.      Right Ear: External ear normal.      Left Ear: External ear normal.      Nose: Nose normal.   Eyes:      General: No scleral icterus.        Right eye: No discharge.         Left eye: No discharge.      Extraocular Movements: Extraocular movements intact.      Conjunctiva/sclera: Conjunctivae normal.   Cardiovascular:      Rate and Rhythm: Normal rate and regular rhythm.      Pulses: Normal pulses.      Heart sounds: Normal heart sounds. No murmur heard.    No friction rub. No gallop.   Pulmonary:      Effort: Pulmonary effort is normal. No respiratory distress.      Breath sounds: Normal breath sounds. No stridor.      Comments: Tracheostomy intact with no notable secretions   Abdominal:      General: Abdomen is flat. Bowel sounds are normal. There is no distension.      Palpations: Abdomen is soft.      Tenderness: There is no abdominal tenderness.      Comments: Old right sided DLES dry on inspection  Left sided DLES bandage clean, dry, and intact    Skin:     General: Skin is warm and dry.      Coloration: Skin is not jaundiced or pale.      Findings:  No erythema.      Comments: Midline chest incision healing    Neurological:      General: No focal deficit present.      Mental Status: He is alert and oriented to person, place, and time. Mental status is at baseline.   Psychiatric:         Mood and Affect: Mood normal.         Behavior: Behavior normal.         Thought Content: Thought content normal.         Judgment: Judgment normal.       Significant Labs: CBC:   Recent Labs   Lab 08/06/22 1940 08/06/22 1951 08/07/22 0342 08/07/22  1143   WBC 34.37*  --  35.24* 32.92*   HGB 8.3*  --  8.5* 8.6*   HCT 28.0* 32* 28.7* 29.7*     --  393 370     CMP:   Recent Labs   Lab 08/06/22 1940 08/07/22 0342 08/07/22  1143    138 137   K 4.2 3.7 4.9    105 103   CO2 22* 22* 25   * 152* 190*   BUN 60* 60* 63*   CREATININE 1.9* 1.9* 2.0*   CALCIUM 9.5 9.8 9.4   PROT 7.0 7.2 6.7   ALBUMIN 2.4* 2.4* 2.5*   BILITOT 6.2* 6.0* 6.1*   ALKPHOS 112 109 114   AST 39 37 37   ALT 50* 50* 52*   ANIONGAP 10 11 9     Microbiology Results (last 7 days)       Procedure Component Value Units Date/Time    IV catheter culture [[id number]] Collected: 08/04/22 1525    Order Status: Completed Specimen: Catheter Tip, Intrajugular Updated: 08/06/22 0950     Aerobic Culture - Cath tip No growth    Culture, Anaerobe [[id number]] Collected: 07/25/22 1503    Order Status: Completed Specimen: Wound from Abdomen Updated: 08/02/22 0715     Anaerobic Culture No anaerobes isolated    Narrative:      New driveline site    Culture, Respiratory with Gram Stain [[id number]] Collected: 07/30/22 1015    Order Status: Completed Specimen: Respiratory from Bronchial Wash Updated: 08/01/22 1117     Respiratory Culture No growth     Gram Stain (Respiratory) <10 epithelial cells per low power field.     Gram Stain (Respiratory) Rare WBC's     Gram Stain (Respiratory) No organisms seen            Significant Imaging: I have reviewed all pertinent imaging results/findings within the past 24  hours.

## 2022-08-07 NOTE — NURSING
"Pt tolerated Spont. 30% 5 PEEP with 12 PS for over an hour and a half with no desats or apnea noted. Pt c/o spont trial "making him tired breathing" and requesting to be placed back on a rate. Resp Tx @ bedside for treatment and changed settings back to AC/VC rate 20, 30% 5 PEEP. VSS with NAD noted. WCTM  "

## 2022-08-07 NOTE — RESPIRATORY THERAPY
VBG obtained at 0803 per order.    Results:    PH 7.376  PCO2 45  PO2 32  BE 1  HCO3 26.4  TCO2 28  sO2 60

## 2022-08-07 NOTE — PLAN OF CARE
Hemodynamics:   Parameter   at 1900   CVP 8   SvO2 79   CI   6.04   CO 12.44      MAP 83     Current Heart Failure Medications:  - Epinephrine 0.04 mcg/kg/min  - Lasix 5 mg/hr    Assessment/Plan:  - Plan was discussed with attending staff     Brandon Sanches MD  Cardiology Fellow PGY IV  Pager: [phone]

## 2022-08-07 NOTE — ASSESSMENT & PLAN NOTE
Key History and Diagnostic Findings  - Hyperglycemia noted once starting TPN in addition to steroids  - No prior history of diabetes; most recent A1c of 5.4 on 04/26/2021  - Changing from TPN to tube feeds on 08/05/2022    Plan  - Continue levemir 6 units QHS  - Resume aspart 4 units q4h with low correction once TF brought to goal   - Please notify endocrine if any change in tube feeds as this will change insulin requirements  - Please ensure that accuchecks are being documented q4h per order

## 2022-08-07 NOTE — PROGRESS NOTES
"[facility]  Endocrinology  Progress Note    Admit Date: 6/27/2022     56 year-old  male with NICM with LVAD, s/p ICD for VT on amiodarone and mexiletine and AIT followed by hypothyroidism initially admitted 6/27/2022 with COVID respiratory failure complicated with LVAD pump thrombosis that was refractory to medical therapy. Underwent LVAD pump exchange. Post-op course complicated by worsening cardiogenic shock/RV failure requiring VA-ECMO. Improved clinically underwent successful decannulation. Continued episodes of VT with ICD shock 7/21 and ongoing anti-arrhythmic mediation adjustments. TFTs on 7/27 significant for recurrent hyperthyroidism with undetectable TSH and elevated fT4.    - Patient re-intubated 07/29/2022    - Endocrinology consulted for recurrent AIT    Regarding AIT:    - Last seen outpatient by Dr. 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 He required a prolonged course of prednisone and methimazole, then subsequently developed hypothyroidism in May 2020. LT4 was adjusted based on serial TFT measurements. Most recently levothyroxine dose has been 112 mcg     - He self-decreased his amiodarone dose to 200 mg daily about 6 months ago. T4 was high on 7/13, but he was continued on levothyroxine 112 mcg    Lab Results   Component Value Date    TSH <0.010 (L) 07/27/2022    TSH 0.111 (L) 07/13/2022    TSH 4.079 (H) 03/17/2022    FREET4 2.51 (H) 08/07/2022    FREET4 2.95 (H) 08/06/2022    FREET4 2.18 (H) 08/05/2022       Interval HPI:   Overnight events:  No acute events reported overnight  Eating:   NPO  Nausea: No  Hypoglycemia and intervention: No  Fever: No  TPN and/or TF: Yes  If yes, type of TF/TPN and rate: TF at 40 mL/hr    BP (!) 76/0 (BP Location: Right arm, Patient Position: Lying)   Pulse 90   Temp 96.62 °F (35.9 °C)   Resp 20   Ht 6' 1" (1.854 m)   Wt 83.5 kg (184 lb)   " SpO2 100%   BMI 24.28 kg/m²     Labs Reviewed and Include    Recent Labs   Lab 08/07/22 0342   *   CALCIUM 9.8   ALBUMIN 2.4*   PROT 7.2      K 3.7   CO2 22*      BUN 60*   CREATININE 1.9*   ALKPHOS 109   ALT 50*   AST 37   BILITOT 6.0*     Lab Results   Component Value Date    WBC 35.24 (H) 08/07/2022    HGB 8.5 (L) 08/07/2022    HCT 28.7 (L) 08/07/2022    MCV 97 08/07/2022     08/07/2022     Recent Labs   Lab 08/06/22  0342 08/07/22  0342   FREET4 2.95* 2.51*     Lab Results   Component Value Date    HGBA1C 5.4 04/26/2021       Nutritional status:   Body mass index is 24.28 kg/m².  Lab Results   Component Value Date    ALBUMIN 2.4 (L) 08/07/2022    ALBUMIN 2.4 (L) 08/06/2022    ALBUMIN 2.6 (L) 08/06/2022     Lab Results   Component Value Date    PREALBUMIN 28 08/05/2022    PREALBUMIN 13 (L) 07/29/2022    PREALBUMIN 11 (L) 07/17/2022       Estimated Creatinine Clearance: 49.6 mL/min (A) (based on SCr of 1.9 mg/dL (H)).    Accu-Checks  Recent Labs     08/05/22  1722 08/05/22  2110 08/06/22  0342 08/06/22  0835 08/06/22  1208 08/06/22  1541 08/06/22  1939 08/07/22  0016 08/07/22  0022 08/07/22  0322   POCTGLUCOSE 183* 138* 125* 121* 124* 146* 135* 210* 159* 142*       Current Medications and/or Treatments Impacting Glycemic Control  Immunotherapy:    Immunosuppressants       None          Steroids:   Hormones (From admission, onward)                Start     Stop Route Frequency Ordered    08/05/22 0900  predniSONE tablet 60 mg         -- PER NG TUBE Daily 08/05/22 0740    08/02/22 1931  melatonin tablet 6 mg         -- OG Nightly PRN 08/02/22 1832          Pressors:    Autonomic Drugs (From admission, onward)                Start     Stop Route Frequency Ordered    07/29/22 0011  EPINEPHrine (ADRENALIN) 1 mg/mL injection        Note to Pharmacy: Created by cabinet override    07/29 1214   07/29/22 0011    07/29/22 0007  EPINEPHrine 5 mg in dextrose 5% 250 mL infusion (premix)         Question Answer Comment   Begin at (in mcg/kg/min): 0.01    Titrate by: (in mcg/kg/min) 0.01    Titrate interval: (in minutes) 10    Titrate to maintain: (SBP or MAP or Cardiac Index) MAP    Greater than: (in mmHg) 65    Maximum dose: (in mcg/kg/min) 2        -- IV Continuous 07/29/22 0008    07/23/22 1400  midodrine tablet 15 mg         -- PER NG TUBE Every 8 hours 07/23/22 0944          Hyperglycemia/Diabetes Medications:   Antihyperglycemics (From admission, onward)                Start     Stop Route Frequency Ordered    08/05/22 1200  insulin aspart U-100 pen 5 Units         -- SubQ 6 times per day 08/05/22 0930    08/03/22 2100  insulin detemir U-100 pen 6 Units         -- SubQ Nightly 08/03/22 1900    07/30/22 1023  insulin aspart U-100 pen 0-5 Units         -- SubQ Every 4 hours PRN 07/30/22 0925            ASSESSMENT and PLAN    Amiodarone-induced hyperthyroidism  Key History and Diagnostic Findings  - History of AIT type 2 (TRAb and TSI negative; Thyroid US unremarkable with low vascularity)  - Weaned off methimazole and prednisone by May 2020, then developed hypothyroidism, LT4 started and dose titrated per TFTs. Prior to current admission he was on LT4 112 mcg daily  - Labs consistent with hypothyroidism until current admission, initially on 7/13, with low TSH and elevated fT4. Repeat TFTs on 7/27 with worsening hyperthyroidism. He continued to receive LT4 112 mcg through 7/28. He remains on amiodarone for episodes of VT  - Suspect current hyperthyroidism is AIT, however continued exogenous LT4 certainly contribute to current presentation   - Free T4 improving today at 2.5 from 2.95 yesterday  Lab Results   Component Value Date    TSH <0.010 (L) 07/27/2022    TSH 0.111 (L) 07/13/2022    TSH 4.079 (H) 03/17/2022    FREET4 2.51 (H) 08/07/2022    FREET4 2.95 (H) 08/06/2022    FREET4 2.18 (H) 08/05/2022     Plan  - Strongly suspect AIT (type 1 or 2); continuing empiric treatment for both  - Heparin can  cause a false elevation in free T4 as it displaces free T4 from TBG; will follow-up direct dialysis result which may take up to 1 week  - Continue Methimazole 20 mg TID   - Continue Prednisone 60 mg daily  - Continue Cholestyramine 4g QID  - Continue checking daily free T4    amiodarone induced hypothyrodism  - Levothyroxine discontinued on 7/28/2022  - Please see plan as above    Hyperglycemia  Key History and Diagnostic Findings  - Hyperglycemia noted once starting TPN in addition to steroids  - No prior history of diabetes; most recent A1c of 5.4 on 04/26/2021  - Changing from TPN to tube feeds on 08/05/2022    Plan  - Continue levemir 6 units QHS  - Resume aspart 4 units q4h with low correction once TF brought to goal   - Please notify endocrine if any change in tube feeds as this will change insulin requirements  - Please ensure that accuchecks are being documented q4h per order      Akira Zuñiga DO  [facility], [address]th Floor  [address] [place], [place], [address]    Office: [phone]  Fax: [phone]    Disclaimer: This note has been generated using voice-recognition software. There may be typographical errors that have been missed during proof-reading.    The above history labs imaging impression and plan were discussed with attending physician who is in agreement and also took part in this patient's care.  I personally reviewed all of the patients available medications, labs, imaging, vitals, allergies, medical history.

## 2022-08-07 NOTE — ASSESSMENT & PLAN NOTE
Leukocytosis with associated fever up to 100.9 F status post creation of tracheostomy on 8/4/22. Blood cultures this admission have been unrevealing. Only notable pathogen isolated was Klebsiella aerogenes from sputum for which patient has been treated with cefepime. Metronidazole was added for anaerobic coverage. 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 Old DLES inspected at bedside today and found to be dry with no drainage. Newer left sided DLES bandage seen dry, clean, and intact with no drainage reported by nursing. DLES cultures performed on 7/25 showed no growth. CT c/a/p 7/29 with no evidence of infection. 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 First dose 80 mg now tapered down to 60 mg. No evidence of new infectious process.     Recommendations  - Continue IV cefepime and metronidazole for originally anticipated 14 day pneumonia course; stop date 8/9/22   - suspect leukocytosis is multifactorial in setting of recent surgeries, high dose steroids, thyrotoxicosis  - trend WBC, will initiate further infectious work up if patient develops any additional clinical change  - If patient will be on glucocorticoid dose equivalent to ?20 mg of prednisone daily for one month or longer then would recommend starting PJP prophylaxis with PO TMP//80 mg daily or renally dosed by pharmacy     Will follow

## 2022-08-07 NOTE — ASSESSMENT & PLAN NOTE
Key History and Diagnostic Findings  - History of AIT type 2 (TRAb and TSI negative; Thyroid US unremarkable with low vascularity)  - Weaned off methimazole and prednisone by May 2020, then developed hypothyroidism, LT4 started and dose titrated per TFTs. Prior to current admission he was on LT4 112 mcg daily  - Labs consistent with hypothyroidism until current admission, initially on 7/13, with low TSH and elevated fT4. Repeat TFTs on 7/27 with worsening hyperthyroidism. He continued to receive LT4 112 mcg through 7/28. He remains on amiodarone for episodes of VT  - Suspect current hyperthyroidism is AIT, however continued exogenous LT4 certainly contribute to current presentation   - Free T4 improving today at 2.5 from 2.95 yesterday  Lab Results   Component Value Date    TSH <0.010 (L) 07/27/2022    TSH 0.111 (L) 07/13/2022    TSH 4.079 (H) 03/17/2022    FREET4 2.51 (H) 08/07/2022    FREET4 2.95 (H) 08/06/2022    FREET4 2.18 (H) 08/05/2022     Plan  - Strongly suspect AIT (type 1 or 2); continuing empiric treatment for both  - Heparin can cause a false elevation in free T4 as it displaces free T4 from TBG; will follow-up direct dialysis result which may take up to 1 week  - Continue Methimazole 20 mg TID   - Continue Prednisone 60 mg daily  - Continue Cholestyramine 4g QID  - Continue checking daily free T4

## 2022-08-07 NOTE — PROGRESS NOTES
[facility]  Heart Failure  Progress Note     Hospital Length of Stay: 41    Interval History:  - No significant events overnight. Nausea has resolved. Denies any abd discomfort.   - Diarrhea has subsided. Was on miralax, which was stopped yesterday.   - No fever in last 48 hour. WBC is trending up. We will re consult ID team.   - No significant event in LVAD recorded.   - Will continue TPN.       Hemodynamics:   On epinephrine of 0.04, and lasix 5 mg/hr.   SvO2 of 68, CV 9  CO 8.1, CI 3.7 and    1.33    Vitals:  Temp:  [96.08 °F (35.6 °C)-98.24 °F (36.8 °C)] 96.44 °F (35.8 °C)  Pulse:  [] 89  Resp:  [19-95] 20  SpO2:  [95 %-100 %] 99 %  BP: (72-84)/(0) 78/0  Arterial Line BP: (74-92)/(63-81) 87/78    Intake/Output    Intake/Output Summary (Last 24 hours) at 8/7/2022 1002  Last data filed at 8/7/2022 0900  Gross per 24 hour   Intake 2092.53 ml   Output 3985 ml   Net -1892.47 ml       Physical Exam  Gen: Trach in place, follows  command.   HEENT: Pupils equal and reactive to light  Cardio: Regular rate, point of maximal impulse not displaced,1+ radial pulses bilaterally, 1+ DP pulses bilaterally, VAD hum obstructing heart sounds  Resp: No additional sound heard.   Abd: Soft, non-tender, non-distended  Skin: Warm,  trace peripheral edema  Neuro: No gross abnormalities.   Psych: unable to assess     Labs:  Recent Labs   Lab 08/06/22 1212 08/06/22 1940 08/06/22 1951 08/07/22  0342   WBC 32.81* 34.37*  --  35.24*   HGB 8.2* 8.3*  --  8.5*   HCT 27.5* 28.0* 32* 28.7*    405  --  393     Recent Labs   Lab 08/06/22 1212 08/06/22 1940 08/07/22  0342    138 138   K 4.7 4.2 3.7    106 105   CO2 19* 22* 22*   BUN 59* 60* 60*   CREATININE 1.8* 1.9* 1.9*   * 134* 152*   CALCIUM 9.8 9.5 9.8   MG 2.4 2.4 2.3   PHOS 4.1 3.8 3.2       Micro:    Current Meds:   acetylcysteine 100 mg/ml (10%)  4 mL Nebulization Q6H    amiodarone  400 mg Oral TID    aspirin  81 mg Per  OG tube Daily    ceFEPime (MAXIPIME) IVPB  2 g Intravenous Q12H    cholestyramine  1 packet Per NG tube QID    guaiFENesin 100 mg/5 ml  300 mg Per NG tube Q6H    insulin aspart U-100  5 Units Subcutaneous 6 times per day    insulin detemir U-100  6 Units Subcutaneous QHS    levalbuterol  0.63 mg Nebulization Q6H    magnesium oxide  400 mg Oral BID    methIMAzole  20 mg Per NG tube TID    metroNIDAZOLE  500 mg Oral Q8H    mexiletine  400 mg Oral Q8H    midodrine  15 mg Per NG tube Q8H    ondansetron  4 mg Intravenous Once    pantoprazole  40 mg Intravenous BID    potassium bicarbonate  40 mEq Per NG tube BID    predniSONE  60 mg Per NG tube Daily       Continuous Infusions:   dexmedetomidine (PRECEDEX) infusion Stopped (08/06/22 2126)    dextrose 10 % in water (D10W)      EPINEPHrine 0.04 mcg/kg/min (08/07/22 0900)    furosemide (LASIX) 2 mg/mL continuous infusion (non-titrating) 5 mg/hr (08/07/22 0900)    heparin (porcine) in 5 % dex 2,000 Units/hr (08/07/22 0900)    nicardipine Stopped (08/05/22 1733)    TPN ADULT CENTRAL LINE CUSTOM 35 mL/hr at 08/07/22 0900     Assessment and Plan:  Cardiogenic Shock  -Previous HM2, underwent pump exchange to 3 on 7/13/22 complicated by worsening CS/RV failure requiring [facility] ECMO now decannulated  -Re-intubated on 7/29/22 due to hypercapnic resp failure  -Vaso stopped in the AM.   -On epinephrine 0.04 and lasix 5 currently.      LVAD  TXP LVAD INTERROGATIONS 7/31/2022 7/31/2022 7/31/2022 7/31/2022 7/31/2022 7/31/2022 7/31/2022   Type HeartMate3 HeartMate3 HeartMate3 HeartMate3 HeartMate3 HeartMate3 HeartMate3   Flow 5.3 5.5 5.3 5.3 5.3 5.4 5.5   Speed 6200 6200 6200 6200 6200 6200 6200   PI 3.2 3.3 3.3 3.3 3.1 3.2 3.1   Power (Jackson) 5.2 5.1 5.1 5.2 5.1 5.1 5.1   LSL 5800 - - - 5800 - -   Low Flow Alarm - - - - - - -   Pulsatility Intermittent pulse - - - Intermittent pulse - -     Fever: No fever in 48 hours.   However, leucocytosis worsening.   Also, has  leucocytosis, although on steroid also.   Will re consult ID team.   Continue cefepime/flagyl for total of 14 days.   If spike fever again, repeat Blood culture.      History of ventricular tachycardia/Episode of A flutter 2:1 block  Patient experienced an episode of VT on 6/30 and experienced an ICD shock thought to be related to hypokalemia (K of 3.1 on 6/30)  Aggressively replete K, keep K>4 and Mg>2  Continue mexiletine PO.  Amio gtt transitioned to PO.     COVID-19 virus infection  -Previously hypoxic then recovered  -ID was consulted, recommended Remdesivir, course completed     Elevated serum lactate dehydrogenase (LDH)  S/p HM3 exchange for HM2 pump thrombosis  Continue to trend LDH.      amiodarone induced hypothyrodism initially, now hyperthyroidism  -Endocrine team on board.  -On prednisone and methimazole. Prednisone  60 mg.   -Continue to monitor free T4.  - Medications has been changes as per Endocrinology team.      Chronic combined systolic and diastolic heart failure  ADHF  Underwent HM III upgrade on 7/13/22, currently on [facility] ECMO  Currently on Epi gtt, Vasopressin  Currently on Precedex  gtt for sedation  Being treated for sepsis.   On cefepime and flagyl.  Currently trach ed. Trach placed on 8/4. (reintubarted 7/29)  Chest tube removal, bronch, and old driveline removed 7/22      Scot Card MD, FACP  Cardiovascular Disease Fellow PGY4  [facility]

## 2022-08-07 NOTE — SUBJECTIVE & OBJECTIVE
Interval History: called back for increasing WBC. Pt overall appears well. Per bedside RN, DLES drainage significantly improved compared to prior, scant drainage present on dressing. Pt is afebrile, complains of discomfort w/ NG, otherwise no complaints. RN notes some loose BM after bowel regimen. Remains on high dose steroids for amio induced thyrotoxicosis    Review of Systems   Unable to perform ROS: Acuity of condition   Objective:     Vital Signs (Most Recent):  Temp: 97.16 °F (36.2 °C) (08/07/22 1530)  Pulse: 81 (08/07/22 1530)  Resp: (!) 23 (08/07/22 1530)  BP: (!) 72/0 (08/07/22 1515)  SpO2: 98 % (08/07/22 1515)   Vital Signs (24h Range):  Temp:  [96.08 °F (35.6 °C)-98.24 °F (36.8 °C)] 97.16 °F (36.2 °C)  Pulse:  [] 81  Resp:  [17-95] 23  SpO2:  [95 %-100 %] 98 %  BP: (72-84)/(0) 72/0  Arterial Line BP: (74-92)/(64-81) 85/71     Weight: 83.5 kg (184 lb)  Body mass index is 24.28 kg/m².    Estimated Creatinine Clearance: 47.2 mL/min (A) (based on SCr of 2 mg/dL (H)).    Physical Exam  Constitutional:       General: He is not in acute distress.     Appearance: Normal appearance. He is well-developed. He is not ill-appearing or diaphoretic.   HENT:      Head: Normocephalic and atraumatic.      Right Ear: External ear normal.      Left Ear: External ear normal.      Nose: Nose normal.   Eyes:      General: No scleral icterus.        Right eye: No discharge.         Left eye: No discharge.      Extraocular Movements: Extraocular movements intact.      Conjunctiva/sclera: Conjunctivae normal.   Cardiovascular:      Rate and Rhythm: Normal rate and regular rhythm.      Pulses: Normal pulses.      Heart sounds: Normal heart sounds. No murmur heard.    No friction rub. No gallop.   Pulmonary:      Effort: Pulmonary effort is normal. No respiratory distress.      Breath sounds: Normal breath sounds. No stridor.      Comments: Tracheostomy intact with no notable secretions   Abdominal:      General: Abdomen is  flat. Bowel sounds are normal. There is no distension.      Palpations: Abdomen is soft.      Tenderness: There is no abdominal tenderness.      Comments: Old right sided DLES dry on inspection  Left sided DLES bandage clean, dry, and intact    Skin:     General: Skin is warm and dry.      Coloration: Skin is not jaundiced or pale.      Findings: No erythema.      Comments: Midline chest incision healing    Neurological:      General: No focal deficit present.      Mental Status: He is alert and oriented to person, place, and time. Mental status is at baseline.   Psychiatric:         Mood and Affect: Mood normal.         Behavior: Behavior normal.         Thought Content: Thought content normal.         Judgment: Judgment normal.       Significant Labs: CBC:   Recent Labs   Lab 08/06/22 1940 08/06/22 1951 08/07/22 0342 08/07/22  1143   WBC 34.37*  --  35.24* 32.92*   HGB 8.3*  --  8.5* 8.6*   HCT 28.0* 32* 28.7* 29.7*     --  393 370     CMP:   Recent Labs   Lab 08/06/22 1940 08/07/22 0342 08/07/22  1143    138 137   K 4.2 3.7 4.9    105 103   CO2 22* 22* 25   * 152* 190*   BUN 60* 60* 63*   CREATININE 1.9* 1.9* 2.0*   CALCIUM 9.5 9.8 9.4   PROT 7.0 7.2 6.7   ALBUMIN 2.4* 2.4* 2.5*   BILITOT 6.2* 6.0* 6.1*   ALKPHOS 112 109 114   AST 39 37 37   ALT 50* 50* 52*   ANIONGAP 10 11 9     Microbiology Results (last 7 days)       Procedure Component Value Units Date/Time    IV catheter culture [[id number]] Collected: 08/04/22 1525    Order Status: Completed Specimen: Catheter Tip, Intrajugular Updated: 08/06/22 0950     Aerobic Culture - Cath tip No growth    Culture, Anaerobe [[id number]] Collected: 07/25/22 1503    Order Status: Completed Specimen: Wound from Abdomen Updated: 08/02/22 0715     Anaerobic Culture No anaerobes isolated    Narrative:      New driveline site    Culture, Respiratory with Gram Stain [[id number]] Collected: 07/30/22 1015    Order Status: Completed Specimen:  Respiratory from Bronchial Wash Updated: 08/01/22 1117     Respiratory Culture No growth     Gram Stain (Respiratory) <10 epithelial cells per low power field.     Gram Stain (Respiratory) Rare WBC's     Gram Stain (Respiratory) No organisms seen            Significant Imaging: I have reviewed all pertinent imaging results/findings within the past 24 hours.

## 2022-08-07 NOTE — SUBJECTIVE & OBJECTIVE
"Interval HPI:   Overnight events:  No acute events reported overnight  Eating:   NPO  Nausea: No  Hypoglycemia and intervention: No  Fever: No  TPN and/or TF: Yes  If yes, type of TF/TPN and rate: TF at 40 mL/hr    BP (!) 76/0 (BP Location: Right arm, Patient Position: Lying)   Pulse 90   Temp 96.62 °F (35.9 °C)   Resp 20   Ht 6' 1" (1.854 m)   Wt 83.5 kg (184 lb)   SpO2 100%   BMI 24.28 kg/m²     Labs Reviewed and Include    Recent Labs   Lab 08/07/22 0342   *   CALCIUM 9.8   ALBUMIN 2.4*   PROT 7.2      K 3.7   CO2 22*      BUN 60*   CREATININE 1.9*   ALKPHOS 109   ALT 50*   AST 37   BILITOT 6.0*     Lab Results   Component Value Date    WBC 35.24 (H) 08/07/2022    HGB 8.5 (L) 08/07/2022    HCT 28.7 (L) 08/07/2022    MCV 97 08/07/2022     08/07/2022     Recent Labs   Lab 08/06/22  0342 08/07/22 0342   FREET4 2.95* 2.51*     Lab Results   Component Value Date    HGBA1C 5.4 04/26/2021       Nutritional status:   Body mass index is 24.28 kg/m².  Lab Results   Component Value Date    ALBUMIN 2.4 (L) 08/07/2022    ALBUMIN 2.4 (L) 08/06/2022    ALBUMIN 2.6 (L) 08/06/2022     Lab Results   Component Value Date    PREALBUMIN 28 08/05/2022    PREALBUMIN 13 (L) 07/29/2022    PREALBUMIN 11 (L) 07/17/2022       Estimated Creatinine Clearance: 49.6 mL/min (A) (based on SCr of 1.9 mg/dL (H)).    Accu-Checks  Recent Labs     08/05/22  1722 08/05/22  2110 08/06/22  0342 08/06/22  0835 08/06/22  1208 08/06/22  1541 08/06/22  1939 08/07/22  0016 08/07/22  0022 08/07/22  0322   POCTGLUCOSE 183* 138* 125* 121* 124* 146* 135* 210* 159* 142*       Current Medications and/or Treatments Impacting Glycemic Control  Immunotherapy:    Immunosuppressants       None          Steroids:   Hormones (From admission, onward)                Start     Stop Route Frequency Ordered    08/05/22 0900  predniSONE tablet 60 mg         -- PER NG TUBE Daily 08/05/22 0740    08/02/22 1931  melatonin tablet 6 mg         -- " OG Nightly PRN 08/02/22 1832          Pressors:    Autonomic Drugs (From admission, onward)                Start     Stop Route Frequency Ordered    07/29/22 0011  EPINEPHrine (ADRENALIN) 1 mg/mL injection        Note to Pharmacy: Created by cabinet override    07/29 1214   07/29/22 0011    07/29/22 0007  EPINEPHrine 5 mg in dextrose 5% 250 mL infusion (premix)        Question Answer Comment   Begin at (in mcg/kg/min): 0.01    Titrate by: (in mcg/kg/min) 0.01    Titrate interval: (in minutes) 10    Titrate to maintain: (SBP or MAP or Cardiac Index) MAP    Greater than: (in mmHg) 65    Maximum dose: (in mcg/kg/min) 2        -- IV Continuous 07/29/22 0008    07/23/22 1400  midodrine tablet 15 mg         -- PER NG TUBE Every 8 hours 07/23/22 0944          Hyperglycemia/Diabetes Medications:   Antihyperglycemics (From admission, onward)                Start     Stop Route Frequency Ordered    08/05/22 1200  insulin aspart U-100 pen 5 Units         -- SubQ 6 times per day 08/05/22 0930    08/03/22 2100  insulin detemir U-100 pen 6 Units         -- SubQ Nightly 08/03/22 1900    07/30/22 1023  insulin aspart U-100 pen 0-5 Units         -- SubQ Every 4 hours PRN 07/30/22 0925

## 2022-08-07 NOTE — PROGRESS NOTES
08/07/2022  Estephania Martinez    Current provider:  Roslyn Ragsdale DO    Device interrogation:  TXP LVAD INTERROGATIONS 8/7/2022 8/7/2022 8/7/2022 8/7/2022 8/7/2022 8/7/2022 8/7/2022   Type HeartMate3 HeartMate3 HeartMate3 HeartMate3 HeartMate3 HeartMate3 HeartMate3   Flow 5.7 5.7 5.7 5.7 5.7 5.7 5.7   Speed 6300 6300 6300 6300 6300 6300 6300   PI 2.0 1.8 1.7 1.7 2 1.9 2   Power (Jackson) 5.4 5.4 5.4 5.4 5.4 5.4 5.4   LSL 5900 5900 5900 5900 5900 5900 5900   Low Flow Alarm - - - - - - -   Pulsatility Intermittent pulse Intermittent pulse Intermittent pulse Intermittent pulse Intermittent pulse Intermittent pulse Intermittent pulse          Rounded on Tim Richards to ensure all mechanical assist device settings (IABP or VAD) were appropriate and all parameters were within limits.  I was able to ensure all back up equipment was present, the staff had no issues, and the Perfusion Department daily rounding was complete.      For implantable VADs: Interrogation of Ventricular assist device was performed with analysis of device parameters and review of device function. I have personally reviewed the interrogation findings and agree with findings as stated.     In emergency, the nursing units have been notified to contact the perfusion department either by:  Calling [phone] from 630am to 4pm Mon thru Fri, utilizing the On-Call Finder functionality of Epic and searching for Perfusion, or by contacting the hospital  from 4pm to 630am and on weekends and asking to speak with the perfusionist on call.    10:21 AM

## 2022-08-08 LAB
ALBUMIN SERPL BCP-MCNC: 2.4 G/DL (ref 3.5–5.2)
ALLENS TEST: ABNORMAL
ALP SERPL-CCNC: 117 U/L (ref 55–135)
ALP SERPL-CCNC: 118 U/L (ref 55–135)
ALP SERPL-CCNC: 122 U/L (ref 55–135)
ALP SERPL-CCNC: 131 U/L (ref 55–135)
ALT SERPL W/O P-5'-P-CCNC: 50 U/L (ref 10–44)
ALT SERPL W/O P-5'-P-CCNC: 51 U/L (ref 10–44)
ALT SERPL W/O P-5'-P-CCNC: 51 U/L (ref 10–44)
ALT SERPL W/O P-5'-P-CCNC: 52 U/L (ref 10–44)
ANION GAP SERPL CALC-SCNC: 10 MMOL/L (ref 8–16)
ANION GAP SERPL CALC-SCNC: 10 MMOL/L (ref 8–16)
ANION GAP SERPL CALC-SCNC: 11 MMOL/L (ref 8–16)
ANISOCYTOSIS BLD QL SMEAR: SLIGHT
APTT BLDCRRT: 38.5 SEC (ref 21–32)
AST SERPL-CCNC: 36 U/L (ref 10–40)
AST SERPL-CCNC: 37 U/L (ref 10–40)
AST SERPL-CCNC: 38 U/L (ref 10–40)
AST SERPL-CCNC: 38 U/L (ref 10–40)
BASO STIPL BLD QL SMEAR: ABNORMAL
BASO STIPL BLD QL SMEAR: ABNORMAL
BASOPHILS # BLD AUTO: ABNORMAL K/UL (ref 0–0.2)
BASOPHILS # BLD AUTO: ABNORMAL K/UL (ref 0–0.2)
BASOPHILS NFR BLD: 0 % (ref 0–1.9)
BILIRUB DIRECT SERPL-MCNC: 4.1 MG/DL (ref 0.1–0.3)
BILIRUB SERPL-MCNC: 5.1 MG/DL (ref 0.1–1)
BILIRUB SERPL-MCNC: 5.4 MG/DL (ref 0.1–1)
BNP SERPL-MCNC: 471 PG/ML (ref 0–99)
BUN SERPL-MCNC: 65 MG/DL (ref 6–20)
BUN SERPL-MCNC: 67 MG/DL (ref 6–20)
BUN SERPL-MCNC: 70 MG/DL (ref 6–20)
BURR CELLS BLD QL SMEAR: ABNORMAL
CALCIUM SERPL-MCNC: 9.4 MG/DL (ref 8.7–10.5)
CALCIUM SERPL-MCNC: 9.8 MG/DL (ref 8.7–10.5)
CALCIUM SERPL-MCNC: 9.8 MG/DL (ref 8.7–10.5)
CHLORIDE SERPL-SCNC: 102 MMOL/L (ref 95–110)
CHLORIDE SERPL-SCNC: 104 MMOL/L (ref 95–110)
CHLORIDE SERPL-SCNC: 99 MMOL/L (ref 95–110)
CO2 SERPL-SCNC: 24 MMOL/L (ref 23–29)
CO2 SERPL-SCNC: 26 MMOL/L (ref 23–29)
CO2 SERPL-SCNC: 27 MMOL/L (ref 23–29)
CREAT SERPL-MCNC: 1.9 MG/DL (ref 0.5–1.4)
CREAT SERPL-MCNC: 2 MG/DL (ref 0.5–1.4)
CREAT SERPL-MCNC: 2.1 MG/DL (ref 0.5–1.4)
CRP SERPL-MCNC: 14.7 MG/L (ref 0–8.2)
DACRYOCYTES BLD QL SMEAR: ABNORMAL
DELSYS: ABNORMAL
DIFFERENTIAL METHOD: ABNORMAL
EOSINOPHIL # BLD AUTO: ABNORMAL K/UL (ref 0–0.5)
EOSINOPHIL # BLD AUTO: ABNORMAL K/UL (ref 0–0.5)
EOSINOPHIL NFR BLD: 0 % (ref 0–8)
ERYTHROCYTE [DISTWIDTH] IN BLOOD BY AUTOMATED COUNT: 19.1 % (ref 11.5–14.5)
ERYTHROCYTE [DISTWIDTH] IN BLOOD BY AUTOMATED COUNT: 19.3 % (ref 11.5–14.5)
ERYTHROCYTE [DISTWIDTH] IN BLOOD BY AUTOMATED COUNT: 19.5 % (ref 11.5–14.5)
ERYTHROCYTE [SEDIMENTATION RATE] IN BLOOD BY WESTERGREN METHOD: 20 MM/H
ERYTHROCYTE [SEDIMENTATION RATE] IN BLOOD BY WESTERGREN METHOD: 24 MM/H
EST. GFR  (NO RACE VARIABLE): 36.5 ML/MIN/1.73 M^2
EST. GFR  (NO RACE VARIABLE): 38.7 ML/MIN/1.73 M^2
EST. GFR  (NO RACE VARIABLE): 41.1 ML/MIN/1.73 M^2
FACT X PPP CHRO-ACNC: 0.29 IU/ML (ref 0.3–0.7)
FACT X PPP CHRO-ACNC: 0.31 IU/ML (ref 0.3–0.7)
FACT X PPP CHRO-ACNC: 0.39 IU/ML (ref 0.3–0.7)
FACT X PPP CHRO-ACNC: 0.44 IU/ML (ref 0.3–0.7)
FIBRINOGEN PPP-MCNC: 550 MG/DL (ref 182–400)
FIO2: 30
FIO2: 40
GIANT PLATELETS BLD QL SMEAR: PRESENT
GLUCOSE SERPL-MCNC: 130 MG/DL (ref 70–110)
GLUCOSE SERPL-MCNC: 139 MG/DL (ref 70–110)
GLUCOSE SERPL-MCNC: 155 MG/DL (ref 70–110)
HCO3 UR-SCNC: 21.9 MMOL/L (ref 24–28)
HCO3 UR-SCNC: 23.3 MMOL/L (ref 24–28)
HCO3 UR-SCNC: 24.6 MMOL/L (ref 24–28)
HCO3 UR-SCNC: 25.7 MMOL/L (ref 24–28)
HCO3 UR-SCNC: 26.4 MMOL/L (ref 24–28)
HCO3 UR-SCNC: 27 MMOL/L (ref 24–28)
HCT VFR BLD AUTO: 29.8 % (ref 40–54)
HCT VFR BLD AUTO: 30.8 % (ref 40–54)
HCT VFR BLD AUTO: 31.3 % (ref 40–54)
HCT VFR BLD CALC: 27 %PCV (ref 36–54)
HCT VFR BLD CALC: 31 %PCV (ref 36–54)
HGB BLD-MCNC: 8.8 G/DL (ref 14–18)
HGB BLD-MCNC: 8.9 G/DL (ref 14–18)
HGB BLD-MCNC: 9 G/DL (ref 14–18)
HYPOCHROMIA BLD QL SMEAR: ABNORMAL
HYPOCHROMIA BLD QL SMEAR: ABNORMAL
IMM GRANULOCYTES # BLD AUTO: ABNORMAL K/UL (ref 0–0.04)
IMM GRANULOCYTES NFR BLD AUTO: ABNORMAL % (ref 0–0.5)
INR PPP: 1.1 (ref 0.8–1.2)
INR PPP: 1.1 (ref 0.8–1.2)
INR PPP: 1.2 (ref 0.8–1.2)
LDH SERPL L TO P-CCNC: 280 U/L (ref 110–260)
LYMPHOCYTES # BLD AUTO: ABNORMAL K/UL (ref 1–4.8)
LYMPHOCYTES # BLD AUTO: ABNORMAL K/UL (ref 1–4.8)
LYMPHOCYTES NFR BLD: 0 % (ref 18–48)
LYMPHOCYTES NFR BLD: 1 % (ref 18–48)
LYMPHOCYTES NFR BLD: 2 % (ref 18–48)
MAGNESIUM SERPL-MCNC: 2.6 MG/DL (ref 1.6–2.6)
MAGNESIUM SERPL-MCNC: 2.6 MG/DL (ref 1.6–2.6)
MAGNESIUM SERPL-MCNC: 2.8 MG/DL (ref 1.6–2.6)
MCH RBC QN AUTO: 27.2 PG (ref 27–31)
MCH RBC QN AUTO: 28.4 PG (ref 27–31)
MCH RBC QN AUTO: 28.4 PG (ref 27–31)
MCHC RBC AUTO-ENTMCNC: 28.4 G/DL (ref 32–36)
MCHC RBC AUTO-ENTMCNC: 29.2 G/DL (ref 32–36)
MCHC RBC AUTO-ENTMCNC: 29.5 G/DL (ref 32–36)
MCV RBC AUTO: 96 FL (ref 82–98)
MCV RBC AUTO: 96 FL (ref 82–98)
MCV RBC AUTO: 97 FL (ref 82–98)
MODE: ABNORMAL
MONOCYTES # BLD AUTO: ABNORMAL K/UL (ref 0.3–1)
MONOCYTES # BLD AUTO: ABNORMAL K/UL (ref 0.3–1)
MONOCYTES NFR BLD: 2 % (ref 4–15)
MONOCYTES NFR BLD: 2 % (ref 4–15)
MONOCYTES NFR BLD: 4 % (ref 4–15)
MYELOCYTES NFR BLD MANUAL: 1 %
MYELOCYTES NFR BLD MANUAL: 2 %
NEUTROPHILS NFR BLD: 91 % (ref 38–73)
NEUTROPHILS NFR BLD: 95 % (ref 38–73)
NEUTROPHILS NFR BLD: 97 % (ref 38–73)
NEUTS BAND NFR BLD MANUAL: 1 %
NRBC BLD-RTO: 1 /100 WBC
OVALOCYTES BLD QL SMEAR: ABNORMAL
OVALOCYTES BLD QL SMEAR: ABNORMAL
PCO2 BLDA: 28.9 MMHG (ref 35–45)
PCO2 BLDA: 32.7 MMHG (ref 35–45)
PCO2 BLDA: 34.3 MMHG (ref 35–45)
PCO2 BLDA: 36.1 MMHG (ref 35–45)
PCO2 BLDA: 44 MMHG (ref 35–45)
PCO2 BLDA: 45 MMHG (ref 35–45)
PEEP: 5
PEEP: 5
PH SMN: 7.38 [PH] (ref 7.35–7.45)
PH SMN: 7.4 [PH] (ref 7.35–7.45)
PH SMN: 7.44 [PH] (ref 7.35–7.45)
PH SMN: 7.46 [PH] (ref 7.35–7.45)
PH SMN: 7.48 [PH] (ref 7.35–7.45)
PH SMN: 7.49 [PH] (ref 7.35–7.45)
PHOSPHATE SERPL-MCNC: 2.9 MG/DL (ref 2.7–4.5)
PHOSPHATE SERPL-MCNC: 3.2 MG/DL (ref 2.7–4.5)
PHOSPHATE SERPL-MCNC: 4.4 MG/DL (ref 2.7–4.5)
PLATELET # BLD AUTO: 333 K/UL (ref 150–450)
PLATELET # BLD AUTO: 358 K/UL (ref 150–450)
PLATELET # BLD AUTO: 365 K/UL (ref 150–450)
PLATELET BLD QL SMEAR: ABNORMAL
PLATELET BLD QL SMEAR: ABNORMAL
PMV BLD AUTO: 11.7 FL (ref 9.2–12.9)
PMV BLD AUTO: 11.9 FL (ref 9.2–12.9)
PMV BLD AUTO: 12 FL (ref 9.2–12.9)
PO2 BLDA: 129 MMHG (ref 80–100)
PO2 BLDA: 152 MMHG (ref 80–100)
PO2 BLDA: 189 MMHG (ref 80–100)
PO2 BLDA: 32 MMHG (ref 40–60)
PO2 BLDA: 35 MMHG (ref 40–60)
PO2 BLDA: 95 MMHG (ref 80–100)
POC BE: -1 MMOL/L
POC BE: 0 MMOL/L
POC BE: 1 MMOL/L
POC BE: 1 MMOL/L
POC BE: 2 MMOL/L
POC BE: 2 MMOL/L
POC IONIZED CALCIUM: 1.17 MMOL/L (ref 1.06–1.42)
POC IONIZED CALCIUM: 1.24 MMOL/L (ref 1.06–1.42)
POC SATURATED O2: 100 % (ref 95–100)
POC SATURATED O2: 60 % (ref 95–100)
POC SATURATED O2: 67 % (ref 95–100)
POC SATURATED O2: 98 % (ref 95–100)
POC SATURATED O2: 99 % (ref 95–100)
POC SATURATED O2: 99 % (ref 95–100)
POC TCO2: 23 MMOL/L (ref 23–27)
POC TCO2: 24 MMOL/L (ref 23–27)
POC TCO2: 26 MMOL/L (ref 23–27)
POC TCO2: 27 MMOL/L (ref 23–27)
POC TCO2: 28 MMOL/L (ref 24–29)
POC TCO2: 28 MMOL/L (ref 24–29)
POCT GLUCOSE: 127 MG/DL (ref 70–110)
POCT GLUCOSE: 128 MG/DL (ref 70–110)
POCT GLUCOSE: 136 MG/DL (ref 70–110)
POCT GLUCOSE: 148 MG/DL (ref 70–110)
POCT GLUCOSE: 159 MG/DL (ref 70–110)
POCT GLUCOSE: 162 MG/DL (ref 70–110)
POCT GLUCOSE: 179 MG/DL (ref 70–110)
POCT GLUCOSE: 191 MG/DL (ref 70–110)
POIKILOCYTOSIS BLD QL SMEAR: SLIGHT
POLYCHROMASIA BLD QL SMEAR: ABNORMAL
POTASSIUM BLD-SCNC: 3.9 MMOL/L (ref 3.5–5.1)
POTASSIUM BLD-SCNC: 4.3 MMOL/L (ref 3.5–5.1)
POTASSIUM SERPL-SCNC: 4.5 MMOL/L (ref 3.5–5.1)
POTASSIUM SERPL-SCNC: 4.6 MMOL/L (ref 3.5–5.1)
POTASSIUM SERPL-SCNC: 5.2 MMOL/L (ref 3.5–5.1)
PROCALCITONIN SERPL IA-MCNC: 0.97 NG/ML
PROT SERPL-MCNC: 6.6 G/DL (ref 6–8.4)
PROT SERPL-MCNC: 7.1 G/DL (ref 6–8.4)
PROT SERPL-MCNC: 7.2 G/DL (ref 6–8.4)
PROT SERPL-MCNC: 7.4 G/DL (ref 6–8.4)
PROTHROMBIN TIME: 11.7 SEC (ref 9–12.5)
PROTHROMBIN TIME: 11.9 SEC (ref 9–12.5)
PROTHROMBIN TIME: 12.4 SEC (ref 9–12.5)
RBC # BLD AUTO: 3.1 M/UL (ref 4.6–6.2)
RBC # BLD AUTO: 3.17 M/UL (ref 4.6–6.2)
RBC # BLD AUTO: 3.27 M/UL (ref 4.6–6.2)
SAMPLE: ABNORMAL
SCHISTOCYTES BLD QL SMEAR: ABNORMAL
SCHISTOCYTES BLD QL SMEAR: PRESENT
SITE: ABNORMAL
SODIUM BLD-SCNC: 141 MMOL/L (ref 136–145)
SODIUM BLD-SCNC: 142 MMOL/L (ref 136–145)
SODIUM SERPL-SCNC: 137 MMOL/L (ref 136–145)
SODIUM SERPL-SCNC: 138 MMOL/L (ref 136–145)
SODIUM SERPL-SCNC: 138 MMOL/L (ref 136–145)
SPHEROCYTES BLD QL SMEAR: ABNORMAL
SPHEROCYTES BLD QL SMEAR: ABNORMAL
STOMATOCYTES BLD QL SMEAR: PRESENT
T4 FREE SERPL-MCNC: 2.43 NG/DL (ref 0.71–1.51)
TARGETS BLD QL SMEAR: ABNORMAL
TARGETS BLD QL SMEAR: ABNORMAL
TOXIC GRANULES BLD QL SMEAR: PRESENT
VT: 550
VT: 550
WBC # BLD AUTO: 32.01 K/UL (ref 3.9–12.7)
WBC # BLD AUTO: 36.59 K/UL (ref 3.9–12.7)
WBC # BLD AUTO: 37.09 K/UL (ref 3.9–12.7)
WBC TOXIC VACUOLES BLD QL SMEAR: PRESENT
WBC TOXIC VACUOLES BLD QL SMEAR: PRESENT

## 2022-08-08 PROCEDURE — 84439 ASSAY OF FREE THYROXINE: CPT | Performed by: STUDENT IN AN ORGANIZED HEALTH CARE EDUCATION/TRAINING PROGRAM

## 2022-08-08 PROCEDURE — 99233 PR SUBSEQUENT HOSPITAL CARE,LEVL III: ICD-10-PCS | Mod: ,,, | Performed by: INTERNAL MEDICINE

## 2022-08-08 PROCEDURE — 27000644 HC VENT IN-LINE ADAPTER

## 2022-08-08 PROCEDURE — 97112 NEUROMUSCULAR REEDUCATION: CPT

## 2022-08-08 PROCEDURE — 25000242 PHARM REV CODE 250 ALT 637 W/ HCPCS: Performed by: THORACIC SURGERY (CARDIOTHORACIC VASCULAR SURGERY)

## 2022-08-08 PROCEDURE — 82803 BLOOD GASES ANY COMBINATION: CPT

## 2022-08-08 PROCEDURE — 99232 PR SUBSEQUENT HOSPITAL CARE,LEVL II: ICD-10-PCS | Mod: ,,, | Performed by: INTERNAL MEDICINE

## 2022-08-08 PROCEDURE — 25000003 PHARM REV CODE 250: Performed by: PHYSICIAN ASSISTANT

## 2022-08-08 PROCEDURE — 99900026 HC AIRWAY MAINTENANCE (STAT)

## 2022-08-08 PROCEDURE — 82800 BLOOD PH: CPT

## 2022-08-08 PROCEDURE — 85520 HEPARIN ASSAY: CPT | Mod: 91 | Performed by: THORACIC SURGERY (CARDIOTHORACIC VASCULAR SURGERY)

## 2022-08-08 PROCEDURE — 25000003 PHARM REV CODE 250: Performed by: STUDENT IN AN ORGANIZED HEALTH CARE EDUCATION/TRAINING PROGRAM

## 2022-08-08 PROCEDURE — 63600175 PHARM REV CODE 636 W HCPCS: Performed by: INTERNAL MEDICINE

## 2022-08-08 PROCEDURE — 83615 LACTATE (LD) (LDH) ENZYME: CPT | Performed by: STUDENT IN AN ORGANIZED HEALTH CARE EDUCATION/TRAINING PROGRAM

## 2022-08-08 PROCEDURE — 99900035 HC TECH TIME PER 15 MIN (STAT)

## 2022-08-08 PROCEDURE — 63600175 PHARM REV CODE 636 W HCPCS

## 2022-08-08 PROCEDURE — 83735 ASSAY OF MAGNESIUM: CPT | Mod: 91 | Performed by: THORACIC SURGERY (CARDIOTHORACIC VASCULAR SURGERY)

## 2022-08-08 PROCEDURE — 84295 ASSAY OF SERUM SODIUM: CPT

## 2022-08-08 PROCEDURE — 20000000 HC ICU ROOM

## 2022-08-08 PROCEDURE — 94640 AIRWAY INHALATION TREATMENT: CPT

## 2022-08-08 PROCEDURE — 25000003 PHARM REV CODE 250

## 2022-08-08 PROCEDURE — 84100 ASSAY OF PHOSPHORUS: CPT | Performed by: THORACIC SURGERY (CARDIOTHORACIC VASCULAR SURGERY)

## 2022-08-08 PROCEDURE — 99233 SBSQ HOSP IP/OBS HIGH 50: CPT | Mod: ,,, | Performed by: INTERNAL MEDICINE

## 2022-08-08 PROCEDURE — 86140 C-REACTIVE PROTEIN: CPT | Performed by: STUDENT IN AN ORGANIZED HEALTH CARE EDUCATION/TRAINING PROGRAM

## 2022-08-08 PROCEDURE — 27000221 HC OXYGEN, UP TO 24 HOURS

## 2022-08-08 PROCEDURE — 85014 HEMATOCRIT: CPT

## 2022-08-08 PROCEDURE — 97110 THERAPEUTIC EXERCISES: CPT

## 2022-08-08 PROCEDURE — 25000003 PHARM REV CODE 250: Performed by: INTERNAL MEDICINE

## 2022-08-08 PROCEDURE — 82330 ASSAY OF CALCIUM: CPT

## 2022-08-08 PROCEDURE — 63600175 PHARM REV CODE 636 W HCPCS: Performed by: STUDENT IN AN ORGANIZED HEALTH CARE EDUCATION/TRAINING PROGRAM

## 2022-08-08 PROCEDURE — 80076 HEPATIC FUNCTION PANEL: CPT | Performed by: STUDENT IN AN ORGANIZED HEALTH CARE EDUCATION/TRAINING PROGRAM

## 2022-08-08 PROCEDURE — 85007 BL SMEAR W/DIFF WBC COUNT: CPT | Performed by: THORACIC SURGERY (CARDIOTHORACIC VASCULAR SURGERY)

## 2022-08-08 PROCEDURE — 99232 SBSQ HOSP IP/OBS MODERATE 35: CPT | Mod: ,,, | Performed by: INTERNAL MEDICINE

## 2022-08-08 PROCEDURE — 84132 ASSAY OF SERUM POTASSIUM: CPT

## 2022-08-08 PROCEDURE — 80053 COMPREHEN METABOLIC PANEL: CPT | Mod: 91 | Performed by: THORACIC SURGERY (CARDIOTHORACIC VASCULAR SURGERY)

## 2022-08-08 PROCEDURE — 93750 INTERROGATION VAD IN PERSON: CPT | Mod: ,,, | Performed by: INTERNAL MEDICINE

## 2022-08-08 PROCEDURE — 93750 PR INTERROGATE VENT ASSIST DEV, IN PERSON, W PHYSICIAN ANALYSIS: ICD-10-PCS | Mod: ,,, | Performed by: INTERNAL MEDICINE

## 2022-08-08 PROCEDURE — 25000242 PHARM REV CODE 250 ALT 637 W/ HCPCS

## 2022-08-08 PROCEDURE — 37799 UNLISTED PX VASCULAR SURGERY: CPT

## 2022-08-08 PROCEDURE — C9113 INJ PANTOPRAZOLE SODIUM, VIA: HCPCS

## 2022-08-08 PROCEDURE — 94003 VENT MGMT INPAT SUBQ DAY: CPT

## 2022-08-08 PROCEDURE — 84145 PROCALCITONIN (PCT): CPT | Performed by: STUDENT IN AN ORGANIZED HEALTH CARE EDUCATION/TRAINING PROGRAM

## 2022-08-08 PROCEDURE — 97530 THERAPEUTIC ACTIVITIES: CPT

## 2022-08-08 PROCEDURE — 25000003 PHARM REV CODE 250: Performed by: THORACIC SURGERY (CARDIOTHORACIC VASCULAR SURGERY)

## 2022-08-08 PROCEDURE — 94761 N-INVAS EAR/PLS OXIMETRY MLT: CPT

## 2022-08-08 PROCEDURE — 85384 FIBRINOGEN ACTIVITY: CPT | Performed by: THORACIC SURGERY (CARDIOTHORACIC VASCULAR SURGERY)

## 2022-08-08 PROCEDURE — 85027 COMPLETE CBC AUTOMATED: CPT | Mod: 91 | Performed by: THORACIC SURGERY (CARDIOTHORACIC VASCULAR SURGERY)

## 2022-08-08 PROCEDURE — 85610 PROTHROMBIN TIME: CPT | Performed by: THORACIC SURGERY (CARDIOTHORACIC VASCULAR SURGERY)

## 2022-08-08 PROCEDURE — 83880 ASSAY OF NATRIURETIC PEPTIDE: CPT | Performed by: STUDENT IN AN ORGANIZED HEALTH CARE EDUCATION/TRAINING PROGRAM

## 2022-08-08 PROCEDURE — 82565 ASSAY OF CREATININE: CPT

## 2022-08-08 PROCEDURE — 85730 THROMBOPLASTIN TIME PARTIAL: CPT | Performed by: THORACIC SURGERY (CARDIOTHORACIC VASCULAR SURGERY)

## 2022-08-08 PROCEDURE — 27000248 HC VAD-ADDITIONAL DAY

## 2022-08-08 RX ADMIN — PANTOPRAZOLE SODIUM 40 MG: 40 INJECTION, POWDER, FOR SOLUTION INTRAVENOUS at 08:08

## 2022-08-08 RX ADMIN — METRONIDAZOLE 500 MG: 500 TABLET ORAL at 05:08

## 2022-08-08 RX ADMIN — METHIMAZOLE 20 MG: 10 TABLET ORAL at 09:08

## 2022-08-08 RX ADMIN — HEPARIN SODIUM 2000 UNITS/HR: 5000 INJECTION INTRAVENOUS; SUBCUTANEOUS at 12:08

## 2022-08-08 RX ADMIN — LEVALBUTEROL HYDROCHLORIDE 0.63 MG: 0.63 SOLUTION RESPIRATORY (INHALATION) at 12:08

## 2022-08-08 RX ADMIN — MIDODRINE HYDROCHLORIDE 15 MG: 5 TABLET ORAL at 02:08

## 2022-08-08 RX ADMIN — CHOLESTYRAMINE 4 G: 4 POWDER, FOR SUSPENSION ORAL at 06:08

## 2022-08-08 RX ADMIN — LEVALBUTEROL HYDROCHLORIDE 0.63 MG: 0.63 SOLUTION RESPIRATORY (INHALATION) at 08:08

## 2022-08-08 RX ADMIN — ACETYLCYSTEINE 4 ML: 100 SOLUTION ORAL; RESPIRATORY (INHALATION) at 08:08

## 2022-08-08 RX ADMIN — AMIODARONE HYDROCHLORIDE 400 MG: 200 TABLET ORAL at 08:08

## 2022-08-08 RX ADMIN — GUAIFENESIN 300 MG: 100 SOLUTION ORAL at 11:08

## 2022-08-08 RX ADMIN — GUAIFENESIN 300 MG: 100 SOLUTION ORAL at 06:08

## 2022-08-08 RX ADMIN — AMIODARONE HYDROCHLORIDE 400 MG: 200 TABLET ORAL at 02:08

## 2022-08-08 RX ADMIN — HEPARIN SODIUM 2000 UNITS/HR: 5000 INJECTION INTRAVENOUS; SUBCUTANEOUS at 02:08

## 2022-08-08 RX ADMIN — AMIODARONE HYDROCHLORIDE 400 MG: 200 TABLET ORAL at 09:08

## 2022-08-08 RX ADMIN — INSULIN ASPART 5 UNITS: 100 INJECTION, SOLUTION INTRAVENOUS; SUBCUTANEOUS at 12:08

## 2022-08-08 RX ADMIN — PANTOPRAZOLE SODIUM 40 MG: 40 INJECTION, POWDER, FOR SOLUTION INTRAVENOUS at 09:08

## 2022-08-08 RX ADMIN — MEXILETINE HYDROCHLORIDE 400 MG: 200 CAPSULE ORAL at 05:08

## 2022-08-08 RX ADMIN — GUAIFENESIN 300 MG: 100 SOLUTION ORAL at 05:08

## 2022-08-08 RX ADMIN — MIDODRINE HYDROCHLORIDE 15 MG: 5 TABLET ORAL at 09:08

## 2022-08-08 RX ADMIN — METHIMAZOLE 20 MG: 10 TABLET ORAL at 02:08

## 2022-08-08 RX ADMIN — HYDROMORPHONE HYDROCHLORIDE 0.5 MG: 1 INJECTION, SOLUTION INTRAMUSCULAR; INTRAVENOUS; SUBCUTANEOUS at 12:08

## 2022-08-08 RX ADMIN — GUAIFENESIN 300 MG: 100 SOLUTION ORAL at 12:08

## 2022-08-08 RX ADMIN — MEXILETINE HYDROCHLORIDE 400 MG: 200 CAPSULE ORAL at 02:08

## 2022-08-08 RX ADMIN — POTASSIUM BICARBONATE 40 MEQ: 391 TABLET, EFFERVESCENT ORAL at 08:08

## 2022-08-08 RX ADMIN — ACETYLCYSTEINE 4 ML: 100 SOLUTION ORAL; RESPIRATORY (INHALATION) at 12:08

## 2022-08-08 RX ADMIN — INSULIN ASPART 5 UNITS: 100 INJECTION, SOLUTION INTRAVENOUS; SUBCUTANEOUS at 11:08

## 2022-08-08 RX ADMIN — INSULIN ASPART 5 UNITS: 100 INJECTION, SOLUTION INTRAVENOUS; SUBCUTANEOUS at 04:08

## 2022-08-08 RX ADMIN — CEFEPIME 2 G: 2 INJECTION, POWDER, FOR SOLUTION INTRAVENOUS at 09:08

## 2022-08-08 RX ADMIN — METHIMAZOLE 20 MG: 10 TABLET ORAL at 08:08

## 2022-08-08 RX ADMIN — CHOLESTYRAMINE 4 G: 4 POWDER, FOR SUSPENSION ORAL at 09:08

## 2022-08-08 RX ADMIN — ASPIRIN 81 MG CHEWABLE TABLET 81 MG: 81 TABLET CHEWABLE at 09:08

## 2022-08-08 RX ADMIN — MIDODRINE HYDROCHLORIDE 15 MG: 5 TABLET ORAL at 05:08

## 2022-08-08 RX ADMIN — HYDROMORPHONE HYDROCHLORIDE 0.5 MG: 1 INJECTION, SOLUTION INTRAMUSCULAR; INTRAVENOUS; SUBCUTANEOUS at 07:08

## 2022-08-08 RX ADMIN — POTASSIUM BICARBONATE 40 MEQ: 391 TABLET, EFFERVESCENT ORAL at 09:08

## 2022-08-08 RX ADMIN — HYDROXYZINE HYDROCHLORIDE 25 MG: 25 TABLET ORAL at 09:08

## 2022-08-08 RX ADMIN — MEXILETINE HYDROCHLORIDE 400 MG: 200 CAPSULE ORAL at 09:08

## 2022-08-08 RX ADMIN — METRONIDAZOLE 500 MG: 500 TABLET ORAL at 09:08

## 2022-08-08 RX ADMIN — FUROSEMIDE 5 MG/HR: 10 INJECTION, SOLUTION INTRAMUSCULAR; INTRAVENOUS at 12:08

## 2022-08-08 RX ADMIN — INSULIN ASPART 5 UNITS: 100 INJECTION, SOLUTION INTRAVENOUS; SUBCUTANEOUS at 09:08

## 2022-08-08 RX ADMIN — Medication 400 MG: at 09:08

## 2022-08-08 RX ADMIN — Medication 400 MG: at 08:08

## 2022-08-08 RX ADMIN — INSULIN DETEMIR 6 UNITS: 100 INJECTION, SOLUTION SUBCUTANEOUS at 08:08

## 2022-08-08 RX ADMIN — PREDNISONE 60 MG: 20 TABLET ORAL at 09:08

## 2022-08-08 RX ADMIN — INSULIN ASPART 5 UNITS: 100 INJECTION, SOLUTION INTRAVENOUS; SUBCUTANEOUS at 08:08

## 2022-08-08 RX ADMIN — CHOLESTYRAMINE 4 G: 4 POWDER, FOR SUSPENSION ORAL at 12:08

## 2022-08-08 RX ADMIN — METRONIDAZOLE 500 MG: 500 TABLET ORAL at 02:08

## 2022-08-08 RX ADMIN — CHOLESTYRAMINE 4 G: 4 POWDER, FOR SUSPENSION ORAL at 08:08

## 2022-08-08 NOTE — ASSESSMENT & PLAN NOTE
Leukocytosis with associated fever up to 100.9 F status post creation of tracheostomy on 8/4/22. Blood cultures this admission have been unrevealing. Only notable pathogen isolated was Klebsiella aerogenes from sputum for which patient has been treated with cefepime. Metronidazole was added for anaerobic coverage. 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 Old DLES inspected at bedside today and found to be dry with no drainage. Newer left sided DLES bandage seen dry, clean, and intact with no drainage reported by nursing. DLES cultures performed on 7/25 showed no growth. CT c/a/p 7/29 with no evidence of infection. 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 First dose 80 mg now tapered down to 60 mg. No evidence of new infectious process. Patient denies any alarm symptoms today. Remains afebrile off all pressors. Leukocytosis is likely multifactorial in setting of recent surgeries, high dose steroids, thyrotoxicosis.      Recommendations  --Continue IV cefepime and metronidazole for originally anticipated 14 day pneumonia course; stop date 8/9/22   --Trend WBC; will initiate further infectious work up if patient develops any additional clinical change  --If patient will be on glucocorticoid dose equivalent to ?20 mg of prednisone daily for one month or longer then would recommend starting PJP prophylaxis with PO TMP//80 mg daily or renally dosed by pharmacy

## 2022-08-08 NOTE — PROGRESS NOTES
"[facility]  Endocrinology  Progress Note    Admit Date: 6/27/2022     56 year-old  male with NICM with LVAD, s/p ICD for VT on amiodarone and mexiletine and AIT followed by hypothyroidism initially admitted 6/27/2022 with COVID respiratory failure complicated with LVAD pump thrombosis that was refractory to medical therapy. Underwent LVAD pump exchange. Post-op course complicated by worsening cardiogenic shock/RV failure requiring VA-ECMO. Improved clinically underwent successful decannulation. Continued episodes of VT with ICD shock 7/21 and ongoing anti-arrhythmic mediation adjustments. TFTs on 7/27 significant for recurrent hyperthyroidism with undetectable TSH and elevated fT4.    - Patient re-intubated 07/29/2022    - Endocrinology consulted for recurrent AIT    Regarding AIT:    - Last seen outpatient by Dr. 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 He required a prolonged course of prednisone and methimazole, then subsequently developed hypothyroidism in May 2020. LT4 was adjusted based on serial TFT measurements. Most recently levothyroxine dose has been 112 mcg     - He self-decreased his amiodarone dose to 200 mg daily about 6 months ago. T4 was high on 7/13, but he was continued on levothyroxine 112 mcg    Lab Results   Component Value Date    TSH <0.010 (L) 07/27/2022    TSH 0.111 (L) 07/13/2022    TSH 4.079 (H) 03/17/2022    FREET4 2.51 (H) 08/07/2022    FREET4 2.95 (H) 08/06/2022    FREET4 2.18 (H) 08/05/2022       Interval HPI:   Overnight events: no complaints this morning, fT4 was 2.43 today, continues to be on tube feeds      BP (!) 74/0 (BP Location: Right arm, Patient Position: Lying)   Pulse 81   Temp 97.16 °F (36.2 °C)   Resp (!) 30   Ht 6' 1" (1.854 m)   Wt 81.8 kg (180 lb 5.4 oz)   SpO2 96%   BMI 23.79 kg/m²     Labs Reviewed and Include    Recent Labs   Lab " 08/08/22  0310 08/08/22  0803   *  --    CALCIUM 9.8  --    ALBUMIN 2.4* 2.4*   PROT 7.4 7.1     --    K 4.5  --    CO2 24  --      --    BUN 67*  --    CREATININE 2.1*  --    ALKPHOS 118 117   ALT 51* 51*   AST 38 38   BILITOT 5.4* 5.4*     Lab Results   Component Value Date    WBC 36.59 (H) 08/08/2022    HGB 8.8 (L) 08/08/2022    HCT 31 (L) 08/08/2022    MCV 96 08/08/2022     08/08/2022     Recent Labs   Lab 08/07/22  0342 08/08/22  0310   FREET4 2.51* 2.43*     Lab Results   Component Value Date    HGBA1C 5.4 04/26/2021       Nutritional status:   Body mass index is 23.79 kg/m².  Lab Results   Component Value Date    ALBUMIN 2.4 (L) 08/08/2022    ALBUMIN 2.4 (L) 08/08/2022    ALBUMIN 2.4 (L) 08/07/2022     Lab Results   Component Value Date    PREALBUMIN 28 08/05/2022    PREALBUMIN 13 (L) 07/29/2022    PREALBUMIN 11 (L) 07/17/2022       Estimated Creatinine Clearance: 44.9 mL/min (A) (based on SCr of 2.1 mg/dL (H)).    Accu-Checks  Recent Labs     08/07/22  0322 08/07/22  0807 08/07/22  1139 08/07/22  1553 08/07/22  2007 08/08/22  0022 08/08/22  0310 08/08/22  0802 08/08/22  0932 08/08/22  1228   POCTGLUCOSE 142* 165* 200* 228* 205* 136* 128* 191* 159* 179*       Current Medications and/or Treatments Impacting Glycemic Control  Immunotherapy:    Immunosuppressants       None          Steroids:   Hormones (From admission, onward)                Start     Stop Route Frequency Ordered    08/05/22 0900  predniSONE tablet 60 mg         -- PER NG TUBE Daily 08/05/22 0740    08/02/22 1931  melatonin tablet 6 mg         -- OG Nightly PRN 08/02/22 1832          Pressors:    Autonomic Drugs (From admission, onward)                Start     Stop Route Frequency Ordered    07/29/22 0011  EPINEPHrine (ADRENALIN) 1 mg/mL injection        Note to Pharmacy: Created by cabinet override    07/29 1214   07/29/22 0011 07/29/22 0007  EPINEPHrine 5 mg in dextrose 5% 250 mL infusion (premix)        Question  Answer Comment   Begin at (in mcg/kg/min): 0.01    Titrate by: (in mcg/kg/min) 0.01    Titrate interval: (in minutes) 10    Titrate to maintain: (SBP or MAP or Cardiac Index) MAP    Greater than: (in mmHg) 65    Maximum dose: (in mcg/kg/min) 2        -- IV Continuous 07/29/22 0008    07/23/22 1400  midodrine tablet 15 mg         -- PER NG TUBE Every 8 hours 07/23/22 0944          Hyperglycemia/Diabetes Medications:   Antihyperglycemics (From admission, onward)                Start     Stop Route Frequency Ordered    08/05/22 1200  insulin aspart U-100 pen 5 Units         -- SubQ 6 times per day 08/05/22 0930    08/03/22 2100  insulin detemir U-100 pen 6 Units         -- SubQ Nightly 08/03/22 1900    07/30/22 1023  insulin aspart U-100 pen 0-5 Units         -- SubQ Every 4 hours PRN 07/30/22 0925            ASSESSMENT and PLAN    Amiodarone-induced hyperthyroidism  Key History and Diagnostic Findings  - History of AIT type 2 (TRAb and TSI negative; Thyroid US unremarkable with low vascularity)  - Weaned off methimazole and prednisone by May 2020, then developed hypothyroidism, Levothyroxine started and dose titrated per TFTs. Prior to current admission he was on Levothyroxine 112 mcg daily  - Labs consistent with hypothyroidism until current admission, initially on 7/13, with low TSH and elevated fT4. Repeat TFTs on 7/27 with worsening hyperthyroidism. He continued to receive LT4 112 mcg through 7/28. He remains on amiodarone for episodes of Ventricular Tachycardia  - Suspect current hyperthyroidism is AIT, however continued exogenous LT4 certainly contribute to current presentation   - Free T4 improving today at 2.43   Lab Results   Component Value Date    TSH <0.010 (L) 07/27/2022    TSH 0.111 (L) 07/13/2022    TSH 4.079 (H) 03/17/2022    FREET4 2.43 (H) 08/08/2022    FREET4 2.51 (H) 08/07/2022    FREET4 2.95 (H) 08/06/2022     Plan  - Strongly suspect AIT (type 2); continuing empiric treatment for both  - Heparin  can cause a false elevation in free T4 as it displaces free T4 from TBG; will follow-up direct dialysis result which may take up to 1 week  - Continue Methimazole 20 mg TID   - Continue Prednisone 60 mg daily  - Continue Cholestyramine 4g QID  - Continue checking daily free T4    amiodarone induced hypothyrodism  - Levothyroxine discontinued on 7/28/2022  - Please see plan as above    Hyperglycemia  Key History and Diagnostic Findings  - Hyperglycemia noted once starting TPN in addition to steroids  - No prior history of diabetes; most recent A1c of 5.4 on 04/26/2021  - Changing from TPN to tube feeds on 08/05/2022    Plan  - Continue levemir 6 units QHS  - Continue on Aspart 5 units every 4 hour with low correction while on Tube Feeds  - Please notify endocrine if any change in tube feeds as this will change insulin requirements.  - Please hold insulin if tube feeds are held for some reason  - Please ensure that accuchecks are being documented every 4 hours    Chronic combined systolic and diastolic heart failure  Managed per primary          Poncho Guajardo MD  Endocrinology  [facility]

## 2022-08-08 NOTE — PLAN OF CARE
Recommendations  1. Continue TF of Peptamen 1.5 with Prebio.- As medically feasible, rec'd increasing to goal rate of 60 ml/hr to meet estimated kcal/protein needs, this provides 2160 kcal, 98 g of protein, and 1106 ml water.              - as pressor requirements increase, rec'd trickle feeds for best tolerance. Avoid holding TF for residuals less than 500 ml unless associated with other s/s of intolerance such as N/V/D, abd pain/distention.   2.  If pt solely receiving nutrition through TPN: rec' increasing custom TPN to              - 336 gD + 122 g AA + IV lipids (GIR 2.85)                -this provides 2132 kcals, 122 g of protein   3. Closely monitor for refeeding syndrome and nutrition related labs and discontinue TPN when pt meets goal rate for TF  4. RD following     Goals: Continue to meet % of EEN/EPN by RD f/u date  Nutrition Goal Status: progressing towards goal   Communication of RD Recs: other (POC)

## 2022-08-08 NOTE — PROGRESS NOTES
[facility]  Adult Nutrition  Progress Note    SUMMARY       Recommendations  1. Continue TF of Peptamen 1.5 with Prebio.- As medically feasible, rec'd increasing to goal rate of 60 ml/hr to meet estimated kcal/protein needs, this provides 2160 kcal, 98 g of protein, and 1106 ml water.   - as pressor requirements increase, rec'd trickle feeds for best tolerance. Avoid holding TF for residuals less than 500 ml unless associated with other s/s of intolerance such as N/V/D, abd pain/distention.   2.  If pt solely receiving nutrition through TPN: rec' increasing custom TPN to   - 336 gD + 122 g AA + IV lipids (GIR 2.85)     -this provides 2132 kcals, 122 g of protein   3. Closely monitor for refeeding syndrome and nutrition related labs and discontinue TPN when pt meets goal rate for TF  4. RD following    Goals: Continue to meet % of EEN/EPN by RD f/u date  Nutrition Goal Status: progressing towards goal   Communication of RD Recs: other (POC)    Assessment and Plan    Nutrition Problem:  Moderate Protein-Calorie Malnutrition  Malnutrition in the context of Acute Illness/Injury      Related to (etiology):  Inadequate energy intake, NPO, intubation      Signs and Symptoms (as evidenced by):  Energy Intake: <50% of estimated energy requirement x 5 days  Weight Loss: ~9% x <1 month  Fluid Accumulation: mild (1-2+ edema)     Interventions(treatment strategy):  Collaboration of nutrition care with other providers  Enteral nutrition      Nutrition Diagnosis Status:  Continues       Reason for Assessment    Reason For Assessment: RD follow-up  Diagnosis:  (LVAD complication)  Relevant Medical History: CHF, gout, cardiomyopathy  Interdisciplinary Rounds: attended  General Information Comments: Pt w/ history of LVAD and trach present. Pt tolerating tube feeds @ 30 ml/hr.  TPN infusing @ 35 ml/hr- pt tolerating. No N/V/D or abd pain noted. Since last RD note,  42 lb weight loss noted. Visual NFPE  "completed 8/8/22. Pt is at risk for severe protein malnutrition if continued weight loss occurs, pt still meets criteria for moderate malnutrition per ASPEN guidelines- see PES statement for details. LBM noted-8/6/22  Nutrition Discharge Planning: adequate nutrition intake    Nutrition Risk Screen    Nutrition Risk Screen: tube feeding or parenteral nutrition    Nutrition/Diet History    Spiritual, Cultural Beliefs, [employer] Practices, Values that Affect Care: no  Food Allergies: NKFA    Anthropometrics    Temp: 96.44 °F (35.8 °C)  Height Method: Stated  Height: 6' 1" (185.4 cm)  Height (inches): 73 in  Weight Method: Bed Scale  Weight: 81.8 kg (180 lb 5.4 oz)  Weight (lb): 180.34 lb  Ideal Body Weight (IBW), Male: 184 lb  % Ideal Body Weight, Male (lb): 125.54 %  BMI (Calculated): 23.8  BMI Grade: 25 - 29.9 - overweight       Lab/Procedures/Meds    Pertinent Labs Reviewed: reviewed  Pertinent Labs Comments: BUN 65, Cr 2.0, GFR 38.7, Glucose 155  Pertinent Medications Reviewed: reviewed  Pertinent Medications Comments: TPN + IV lipids      Estimated/Assessed Needs    Weight Used For Calorie Calculations: 81.8 kg (180 lb 5.4 oz)  Energy Calorie Requirements (kcal): 2132 (PAL 1.25)  Energy Need Method: [place]  Protein Requirements: 106-122 g (1.3-1.5 g/kg)  Weight Used For Protein Calculations: 81.8 kg (180 lb 5.4 oz)  Fluid Requirements (mL): 1 ml or fluid per MD     RDA Method (mL): 2132         Nutrition Prescription Ordered    Current Diet Order: NPO status  Nutrition Order Comments: 150 g D + 80 g AA + IV lipids  Current Nutrition Support Formula Ordered: Peptamen 1.5 w/Prebio  Current Nutrition Support Rate Ordered: 30 (ml)  Current Nutrition Support Frequency Ordered: ml/hr    Evaluation of Received Nutrient/Fluid Intake    Enteral Calories (kcal): 1080  Enteral Protein (gm): 49  Enteral (Free Water) Fluid (mL): 554  Parenteral Calories (kcal): 830  Parenteral Protein (gm): 80  Parenteral Fluid (mL): " 840  Lipid Calories (kcals): 500 kcals  GIR (Glucose Infusion Rate) (mg/kg/min): 1.25 mg/kg/min  Total Calories (kcal): 2410 (1080 calories from EN and 1330 calories from PN)  % Kcal Needs: 113  % Protein Needs: 105  I/O: -14.2 L since 7/25  Energy Calories Required: exceeds needs  Protein Required: exceeds needs  Fluid Required: exceeds needs  Total Fluid Intake (mL/kg): 1 ml or fluid per MD  Tolerance: tolerating  % Intake of Estimated Energy Needs: 100%  % Meal Intake: NPO    Nutrition Risk    Level of Risk/Frequency of Follow-up: low ((1 x/week))     Monitor and Evaluation    Food and Nutrient Intake: energy intake, food and beverage intake, enteral nutrition intake, parenteral nutrition intake  Food and Nutrient Adminstration: diet order, enteral and parenteral nutrition administration  Knowledge/Beliefs/Attitudes: food and nutrition knowledge/skill  Physical Activity and Function: nutrition-related ADLs and IADLs  Anthropometric Measurements: weight, weight change  Biochemical Data, Medical Tests and Procedures: electrolyte and renal panel, gastrointestinal profile, glucose/endocrine profile, inflammatory profile, lipid profile  Nutrition-Focused Physical Findings: overall appearance, extremities, muscles and bones     Nutrition Follow-Up    RD Follow-up?: Yes

## 2022-08-08 NOTE — PROGRESS NOTES
[facility]  Heart Failure  Progress Note     Hospital Length of Stay: 42  Interval History:  - No significant events overnight. Nausea has resolved. Denies any abd discomfort.   - No fever in last 72 hour. WBC is trending up.   - No significant event in LVAD recorded.   - Will continue TPN. Will titrate the tube feeding till it reaches goa.   - Will stop lasix drip.     Hemodynamics:   On epinephrine of 0.02, and lasix 5 mg/hr.   SvO2 of 67, CVP 7  CO 7.5, CI 3.4 and     Vitals:  Temp:  [96.62 °F (35.9 °C)-97.7 °F (36.5 °C)] 97.16 °F (36.2 °C)  Pulse:  [78-92] 81  Resp:  [20-34] 30  SpO2:  [96 %-99 %] 96 %  BP: (72-84)/(0) 74/0  Arterial Line BP: ()/() 92/78    Intake/Output    Intake/Output Summary (Last 24 hours) at 8/8/2022 1302  Last data filed at 8/8/2022 1100  Gross per 24 hour   Intake 2870.78 ml   Output 3330 ml   Net -459.22 ml     Physical Exam  Gen: Trach in place, follows  command.   HEENT: Pupils equal and reactive to light  Cardio: Regular rate, point of maximal impulse not displaced,1+ radial pulses bilaterally, 1+ DP pulses bilaterally, VAD hum obstructing heart sounds  Resp: No additional sound heard.   Abd: Soft, non-tender, non-distended  Skin: Warm,  trace peripheral edema  Neuro: No gross abnormalities.   Psych: unable to assess     Labs:  Recent Labs   Lab 08/07/22  1143 08/07/22  1932 08/07/22 2008 08/08/22 0310 08/08/22  0348   WBC 32.92*  --  32.09* 36.59*  --    HGB 8.6*  --  8.9* 8.8*  --    HCT 29.7*   < > 30.0* 29.8* 31*     --  373 358  --     < > = values in this interval not displayed.     Recent Labs   Lab 08/07/22  1143 08/07/22 2008 08/08/22 0310 08/08/22  0320    137 138  --    K 4.9 4.7 4.5  --     102 104  --    CO2 25 23 24  --    BUN 63* 65* 67*  --    CREATININE 2.0* 2.0* 2.1*  --    * 190* 130*  --    CALCIUM 9.4 9.5 9.8  --    MG 2.6 2.6  --  2.6   PHOS 3.0 3.5 2.9  --        Micro:    Current Meds:    acetylcysteine 100 mg/ml (10%)  4 mL Nebulization Q6H    amiodarone  400 mg Oral TID    aspirin  81 mg Per OG tube Daily    ceFEPime (MAXIPIME) IVPB  2 g Intravenous Q12H    cholestyramine  1 packet Per NG tube QID    guaiFENesin 100 mg/5 ml  300 mg Per NG tube Q6H    insulin aspart U-100  5 Units Subcutaneous 6 times per day    insulin detemir U-100  6 Units Subcutaneous QHS    levalbuterol  0.63 mg Nebulization Q6H    magnesium oxide  400 mg Oral BID    methIMAzole  20 mg Per NG tube TID    metroNIDAZOLE  500 mg Oral Q8H    mexiletine  400 mg Oral Q8H    midodrine  15 mg Per NG tube Q8H    ondansetron  4 mg Intravenous Once    pantoprazole  40 mg Intravenous BID    potassium bicarbonate  40 mEq Per NG tube BID    predniSONE  60 mg Per NG tube Daily       Continuous Infusions:   dextrose 10 % in water (D10W)      EPINEPHrine 0.02 mcg/kg/min (08/08/22 1100)    heparin (porcine) in 5 % dex 2,000 Units/hr (08/08/22 1100)    nicardipine Stopped (08/05/22 1733)    TPN ADULT CENTRAL LINE CUSTOM 35 mL/hr at 08/08/22 1100     Assessment and Plan:  Cardiogenic Shock  -Previous HM2, underwent pump exchange to HM3 on 7/13/22 complicated by worsening CS/RV failure requiring VA ECMO now decannulated  -Re-intubated on 7/29/22 due to hypercapnic resp failure  -Vaso stopped in the AM.   -On epinephrine 0.04 and lasix 5 currently.      LVAD  TXP LVAD INTERROGATIONS 7/31/2022 7/31/2022 7/31/2022 7/31/2022 7/31/2022 7/31/2022 7/31/2022   Type HeartMate3 HeartMate3 HeartMate3 HeartMate3 HeartMate3 HeartMate3 HeartMate3   Flow 5.3 5.5 5.3 5.3 5.3 5.4 5.5   Speed 6200 6200 6200 6200 6200 6200 6200   PI 3.2 3.3 3.3 3.3 3.1 3.2 3.1   Power ([place]) 5.2 5.1 5.1 5.2 5.1 5.1 5.1   LSL 5800 - - - 5800 - -   Low Flow Alarm - - - - - - -   Pulsatility Intermittent pulse - - - Intermittent pulse - -     Fever: No fever in 72 hours.   However, leucocytosis worsening.   ID team on board.   Continue cefepime/flagyl for total of  14 days.   If spike fever again, will repeat Blood culture.      History of ventricular tachycardia/Episode of A flutter 2:1 block  Patient experienced an episode of VT on 6/30 and experienced an ICD shock thought to be related to hypokalemia (K of 3.1 on 6/30)  Aggressively replete K, keep K>4 and Mg>2  Continue mexiletine PO.  Amio gtt transitioned to PO.     COVID-19 virus infection  -Previously hypoxic then recovered  -ID was consulted, recommended Remdesivir, course completed     Elevated serum lactate dehydrogenase (LDH)  S/p HM3 exchange for HM2 pump thrombosis  Continue to trend LDH.      amiodarone induced hypothyrodism initially, now hyperthyroidism  -Endocrine team on board.  -On prednisone and methimazole. Prednisone  60 mg.   -Continue to monitor free T4.  - Medications has been changes as per Endocrinology team.      Chronic combined systolic and diastolic heart failure  ADHF  Underwent HM III upgrade on 7/13/22, currently on VA ECMO  Will titrate the epi down as tolerated.   Being treated for sepsis.   On cefepime and flagyl.  Currently trach ed. Trach placed on 8/4. (reintubarted 7/29)  Chest tube removal, bronch, and old driveline removed 7/22      Scot Card MD, FACP  Cardiovascular Disease Fellow PGY4  [facility]

## 2022-08-08 NOTE — PLAN OF CARE
"SICU PLAN OF CARE NOTE    Dx: Left ventricular assist device (LVAD) complication    Goals of Care:  MAP 65-85, K >4.5, Mg >2.5    Vital Signs:  BP (!) 78/0 (BP Location: Right arm, Patient Position: Lying)   Pulse 90   Temp 96.8 °F (36 °C)   Resp 20   Ht 6' 1" (1.854 m)   Wt 83.5 kg (184 lb)   SpO2 96%   BMI 24.28 kg/m²     Cardiac:  VAD HM3    Speed: 6300 RPM    Flow: 5.7-5.8    PI: 1.5 - 2.4    Power: 5.4-5.5    Resp:  Ventilator AC/VC 20/550/30%/5    Neuro:  AAO x4, Follows Commands and Moves All Extremities, Mouths all needs    Gtts:  Epinephrine 0.03 mcg/kg/min             Lasix 5 mg/hr             Heparin 2000 units/hr             TPN 35 ml/hr    Urine Output:  Urinary Catheter 1855 cc/shift (100-200/hr)    Wound Vac 75 ml / shift    Diet:  NPO, Tube Feeds and TPN     Labs/Accuchecks:  BG Q4; Labs reviewed; VAD updated with most recent Hct.    Skin:  All skin remains free from new injury.  Patient turned Q2, Immerse bed in blace, ICU bed working correctly. Heels elevated on Pillow. Heel boots worn for a while. Patient requests them off for periods throughout night. Sacral Foam placed to patient buttock. Refer to flowsheets for further wound Assessment.    Shift Events:  Pt complains of no nausea or abd. Discomfort. Tolerating TF at this time. CVP 7-8 with adequate UOP on lasix gtt. Heparin continues to be therapeutic. Afebrile this shift.  See flowsheet for further assessment/details.  Patient updated on current condition/plan of care, questions answered, and emotional support provided.   MD updated on current condition, vitals, labs, and gtts.     "

## 2022-08-08 NOTE — PROGRESS NOTES
[facility]  Infectious Disease  Progress Note    Patient Name: Tim Richards  MRN: [id number]  Admission Date: 6/27/2022  Length of Stay: 42 days  Attending Physician: Roslyn Ragsdale DO  Primary Care Provider: Deyanira Booth MD    Isolation Status: No active isolations  Assessment/Plan:      Leukocytosis  Leukocytosis with associated fever up to 100.9 F status post creation of tracheostomy on 8/4/22. Blood cultures this admission have been unrevealing. Only notable pathogen isolated was Klebsiella aerogenes from sputum for which patient has been treated with cefepime. Metronidazole was added for anaerobic coverage. 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 Old DLES inspected at bedside today and found to be dry with no drainage. Newer left sided DLES bandage seen dry, clean, and intact with no drainage reported by nursing. DLES cultures performed on 7/25 showed no growth. CT c/a/p 7/29 with no evidence of infection. 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 First dose 80 mg now tapered down to 60 mg. No evidence of new infectious process. Patient denies any alarm symptoms today. Remains afebrile off all pressors. Leukocytosis is likely multifactorial in setting of recent surgeries, high dose steroids, thyrotoxicosis.      Recommendations  --Continue IV cefepime and metronidazole for originally anticipated 14 day pneumonia course; stop date 8/9/22   --Trend WBC; will initiate further infectious work up if patient develops any additional clinical change  --If patient will be on glucocorticoid dose equivalent to ?20 mg of prednisone daily for one month or longer then would recommend starting PJP prophylaxis with PO TMP//80 mg daily or renally dosed by pharmacy        Thank you for your consult. I will follow-up with patient. Please contact us if you have any additional questions.    Harry  "DO Sj  Infectious Disease  [facility] - Surgical Intensive [facility]    Subjective:     Principal Problem:Left ventricular assist device (LVAD) complication    HPI: A 56-year-old man with HFrEF-10%, s/p VAD HM2 (March 2018), ICD, VT on amiodarone who developed malaise, anorexia, SOB, dark urine, and soft stools 3-4 days prior to admission. He was evaluated in [facility] ED at which time he tested positive for Covid-19 infection. He was admitted for further management and started on Remdesivir treatment protocol. Since admission, he feels significantly improved with bowel movements returning to normal and the shortness of breath subsiding.     Infectious Diseases consulted for "covid infection, acute. Patient with LVAD"    ID reconsulted 7/21 for "sepsis and consideration for fungemia". Since pt was last seen by ID, patient underwent AV repair, redo sternotomy, explant of old lvad HM2 with implantation of HM3, and placed on VA-ECMO, takeback 7/14 for mediastinal washout/hematoma, and washout, sternal closure, creation of moises-pericardium (gerotex membrane) and wound vac placement on 7/15. Decannulated ECMO on 7/19 with subsequent fever and hypotension. Pt was placed on broad spectrum abx. Blcx obtained from 7/20 ngtd. S/p bronch on 7/20 - cx with normal respiratory sachin.     ID reconsulted 8/5 for "On cefepime and flagyl day 10 for klebsiella. Spiking fever. Has LVAD. WBC trending up (also on steroid though). Tip culture from CVC in process."      Interval History: Patient seen at bedside. Alert and able to communicate via nodding and attempting to verbalize words with trach in place. Denies abdominal pain, shortness of breath, diarrhea, or secretions. Ongoing leukocytosis. Notably remains on high dose steroids. Has been afebrile with no new focus of infection. Off pressors.     Review of Systems   Constitutional:  Negative for chills, fatigue, fever and unexpected weight change.   HENT:  Negative for congestion, postnasal " drip and trouble swallowing.    Respiratory:  Negative for cough, chest tightness and shortness of breath.    Cardiovascular:  Negative for chest pain, palpitations and leg swelling.   Gastrointestinal:  Negative for abdominal pain, blood in stool, constipation, diarrhea, nausea and vomiting.   Genitourinary:  Negative for difficulty urinating, dysuria and hematuria.   Musculoskeletal:  Negative for arthralgias and back pain.   Neurological:  Negative for dizziness and light-headedness.   Psychiatric/Behavioral:  Negative for confusion.    Objective:     Vital Signs (Most Recent):  Temp: 96.44 °F (35.8 °C) (08/08/22 1515)  Pulse: 73 (08/08/22 1530)  Resp: (!) 22 (08/08/22 1530)  BP: (!) 74/0 (08/08/22 1100)  SpO2: 96 % (08/08/22 1100)   Vital Signs (24h Range):  Temp:  [96.44 °F (35.8 °C)-97.7 °F (36.5 °C)] 96.44 °F (35.8 °C)  Pulse:  [73-92] 73  Resp:  [18-34] 22  SpO2:  [96 %-99 %] 96 %  BP: (72-84)/(0) 74/0  Arterial Line BP: ()/() 91/79     Weight: 81.8 kg (180 lb 5.4 oz)  Body mass index is 23.79 kg/m².    Estimated Creatinine Clearance: 47.2 mL/min (A) (based on SCr of 2 mg/dL (H)).    Physical Exam  Constitutional:       General: He is not in acute distress.     Appearance: Normal appearance. He is well-developed. He is not ill-appearing or diaphoretic.   Cardiovascular:      Rate and Rhythm: Normal rate and regular rhythm.      Pulses: Normal pulses.      Heart sounds: Normal heart sounds. No murmur heard.    No friction rub. No gallop.   Pulmonary:      Effort: Pulmonary effort is normal. No respiratory distress.      Breath sounds: Normal breath sounds. No stridor.      Comments: Tracheostomy intact with no notable secretions   Abdominal:      General: Abdomen is flat. There is no distension.      Palpations: Abdomen is soft.      Tenderness: There is no abdominal tenderness.      Comments: Old right sided DLES dry on inspection  Left sided DLES bandage clean, dry, and intact    Skin:      General: Skin is warm and dry.      Comments: Midline chest incision healing    Neurological:      Mental Status: He is alert.       Significant Labs: All pertinent labs within the past 24 hours have been reviewed.    Significant Imaging: I have reviewed all pertinent imaging results/findings within the past 24 hours.

## 2022-08-08 NOTE — PROGRESS NOTES
08/08/2022  Mejia CARLOS Shane Jr    Current provider:  Roslyn Ragsdale DO    Device interrogation:  TXP LVAD INTERROGATIONS 8/8/2022 8/8/2022 8/8/2022 8/8/2022 8/8/2022 8/8/2022 8/8/2022   Type HeartMate3 HeartMate3 HeartMate3 HeartMate3 HeartMate3 HeartMate3 HeartMate3   Flow 5.8 5.7 5.4 5.7 5.6 5.7 5.7   Speed 6300 6300 6300 6300 6300 6300 6300   PI 2.1 1.7 2.0 1.9 2.2 2.4 2.1   Power ([place]) 5.5 5.3 5.6 5.5 5.4 5.5. 5.5   LSL 5900 5900 5900 5900 5900 5900 5900   Low Flow Alarm - - - - - - -   Pulsatility Intermittent pulse Intermittent pulse Intermittent pulse Intermittent pulse Intermittent pulse Intermittent pulse Intermittent pulse          Rounded on Tim Richards to ensure all mechanical assist device settings (IABP or VAD) were appropriate and all parameters were within limits.  I was able to ensure all back up equipment was present, the staff had no issues, and the Perfusion Department daily rounding was complete.      For implantable VADs: Interrogation of Ventricular assist device was performed with analysis of device parameters and review of device function. I have personally reviewed the interrogation findings and agree with findings as stated.     In emergency, the nursing units have been notified to contact the perfusion department either by:  Calling [phone] from 630am to 4pm Mon thru Fri, utilizing the On-Call Finder functionality of Epic and searching for Perfusion, or by contacting the hospital  from 4pm to 630am and on weekends and asking to speak with the perfusionist on call.    8:05 AM

## 2022-08-08 NOTE — PHYSICIAN QUERY
PT Name: Tim Richards  MR #: [id number]     DOCUMENTATION CLARIFICATION      CDS/: Waleska Vasquez               Contact information:[email or web address]  This form is a permanent document in the medical record.    Query Date: August 8, 2022    By submitting this query, we are merely seeking further clarification of documentation to reflect the severity of illness of your patient. Please utilize your independent clinical judgment when addressing the question(s) below.     The Medical Record contains the following:   Indicators   Supporting Clinical Findings Location in Medical Record   x Documentation/History of condition 57 yo male with previous HM2 LVAD admitted with COVID respiratory failure   Cardiac electrophysiology note 7-25    Lab Value(s)      Radiology Findings     x Treatment/Medication He remains intubated and requiring pressors and RV support with epi/NO.   Currently, patient intubated and on HD support with epi, norepinephrine, vasopressin, angiotensin II, NO. Sedated on propofol and precedex.   Cardiac electrophysiology note 7-25   x Other Hypoxia H&P      Provider, please specify the acuity/chronicity of  respiratory failure:    [  xx ] Acute- s/p surgery which was part of led to him getting intubated to begin with   [   ] Other (please specify): _______________   [   ] Clinically Undetermined     Please document in your progress notes daily for the duration of treatment until resolved, and include in your discharge summary.  Form No. [id number]

## 2022-08-08 NOTE — PHYSICIAN QUERY
"PT Name: Tim Richards  MR #: [id number]    DOCUMENTATION CLARIFICATION     CDS/: Waleska Vasquez               Contact information:[email or web address]  This form is a permanent document in the medical record.    Query Date: August 8, 2022      By submitting this query, we are merely seeking further clarification of documentation.  Please utilize your independent clinical judgment when addressing the question(s) below.    The Medical Record reflects the following:    Clinical Information Location in Medical Record   FINDINGS:  endotracheal tube is at the level of clavicles.  Right internal jugular catheter, left internal jugular catheter, sternotomy wires, lower chest surgical drains, LVAD remain unchanged. Enteric tube tip is not included in the field of view, obscured. There is mildly increased left perihilar opacity which may reflect  edema or developing infection       Antibiotics: meropenem   WBC 26->25K  7/20 +VAP Cx Klebsiella aerogenes      Leukocytosis  Leukocytosis with associated fever post-decannulation. Blcx so far ngtd, resp cx unrevealing. Pt is at risk for fungal infection (prior lines, multiple surgeries). S/P HM3 with removal of old driveline. Up-escalated to meropenem due to acute decompensation.      Leukocytosis stable. Sputum cultures with K aerogenes.    ·De-escalate meropenem to cefepime.  ·Discontinue vancomycin for now. Monitor vancomycin trough.   ·Will follow up DLES cultures.     ID reconsulted 7/21 for "sepsis and consideration for fungemia      Continue IV cefepime and metronidazole for originally anticipated 14 day pneumonia course; stop date 8/9/22  Chest xray 7-18            Nephrology note 7-25            Infectious disease note 7-25                            Infectious disease note 8-7       Please clarify/confirm the Consultants diagnosis of   VAP(Ventilator associated pneumonia).     [ x ] VAP Diagnosis ruled in   [  ] VAP Diagnosis ruled " out..Pneumonia ruled in    [  ] Other diagnosis (please specify): _____________________________   [  ] Clinically undetermined

## 2022-08-08 NOTE — SUBJECTIVE & OBJECTIVE
"Interval HPI:   Overnight events: no complaints this morning, fT4 was 2.43 today, continues to be on tube feeds      BP (!) 74/0 (BP Location: Right arm, Patient Position: Lying)   Pulse 81   Temp 97.16 °F (36.2 °C)   Resp (!) 30   Ht 6' 1" (1.854 m)   Wt 81.8 kg (180 lb 5.4 oz)   SpO2 96%   BMI 23.79 kg/m²     Labs Reviewed and Include    Recent Labs   Lab 08/08/22  0310 08/08/22  0803   *  --    CALCIUM 9.8  --    ALBUMIN 2.4* 2.4*   PROT 7.4 7.1     --    K 4.5  --    CO2 24  --      --    BUN 67*  --    CREATININE 2.1*  --    ALKPHOS 118 117   ALT 51* 51*   AST 38 38   BILITOT 5.4* 5.4*     Lab Results   Component Value Date    WBC 36.59 (H) 08/08/2022    HGB 8.8 (L) 08/08/2022    HCT 31 (L) 08/08/2022    MCV 96 08/08/2022     08/08/2022     Recent Labs   Lab 08/07/22  0342 08/08/22  0310   FREET4 2.51* 2.43*     Lab Results   Component Value Date    HGBA1C 5.4 04/26/2021       Nutritional status:   Body mass index is 23.79 kg/m².  Lab Results   Component Value Date    ALBUMIN 2.4 (L) 08/08/2022    ALBUMIN 2.4 (L) 08/08/2022    ALBUMIN 2.4 (L) 08/07/2022     Lab Results   Component Value Date    PREALBUMIN 28 08/05/2022    PREALBUMIN 13 (L) 07/29/2022    PREALBUMIN 11 (L) 07/17/2022       Estimated Creatinine Clearance: 44.9 mL/min (A) (based on SCr of 2.1 mg/dL (H)).    Accu-Checks  Recent Labs     08/07/22  0322 08/07/22  0807 08/07/22  1139 08/07/22  1553 08/07/22  2007 08/08/22  0022 08/08/22  0310 08/08/22  0802 08/08/22  0932 08/08/22  1228   POCTGLUCOSE 142* 165* 200* 228* 205* 136* 128* 191* 159* 179*       Current Medications and/or Treatments Impacting Glycemic Control  Immunotherapy:    Immunosuppressants       None          Steroids:   Hormones (From admission, onward)                Start     Stop Route Frequency Ordered    08/05/22 0900  predniSONE tablet 60 mg         -- PER NG TUBE Daily 08/05/22 0740    08/02/22 1931  melatonin tablet 6 mg         -- OG Nightly " PRN 08/02/22 1832          Pressors:    Autonomic Drugs (From admission, onward)                Start     Stop Route Frequency Ordered    07/29/22 0011  EPINEPHrine (ADRENALIN) 1 mg/mL injection        Note to Pharmacy: Created by cabinet override    07/29 1214   07/29/22 0011    07/29/22 0007  EPINEPHrine 5 mg in dextrose 5% 250 mL infusion (premix)        Question Answer Comment   Begin at (in mcg/kg/min): 0.01    Titrate by: (in mcg/kg/min) 0.01    Titrate interval: (in minutes) 10    Titrate to maintain: (SBP or MAP or Cardiac Index) MAP    Greater than: (in mmHg) 65    Maximum dose: (in mcg/kg/min) 2        -- IV Continuous 07/29/22 0008    07/23/22 1400  midodrine tablet 15 mg         -- PER NG TUBE Every 8 hours 07/23/22 0944          Hyperglycemia/Diabetes Medications:   Antihyperglycemics (From admission, onward)                Start     Stop Route Frequency Ordered    08/05/22 1200  insulin aspart U-100 pen 5 Units         -- SubQ 6 times per day 08/05/22 0930    08/03/22 2100  insulin detemir U-100 pen 6 Units         -- SubQ Nightly 08/03/22 1900    07/30/22 1023  insulin aspart U-100 pen 0-5 Units         -- SubQ Every 4 hours PRN 07/30/22 0925

## 2022-08-08 NOTE — PT/OT/SLP PROGRESS
Physical Therapy Co-Treatment  Co-treatment completed due to patient's medical complexity and benefit of two skilled therapists to facilitate functional and safe mobility at this time, maximize pt's participation with therapy, and to accommodate pt's activity tolerance while in the ICU.      Patient Name:  Tim Richards   MRN:  [id number]    Recommendations:     Discharge Recommendations:  rehabilitation facility   Discharge Equipment Recommendations:  (TBD by next level of care)   Barriers to discharge: Inaccessible home and Decreased caregiver support    Assessment:     Tim Richards is a 56 y.o. male admitted with a medical diagnosis of Left ventricular assist device (LVAD) complication.  He presents with the following impairments/functional limitations:  weakness, impaired endurance, impaired self care skills, impaired functional mobility, gait instability, impaired balance, decreased coordination, decreased upper extremity function, decreased lower extremity function, decreased safety awareness. Pt progressing well with therapy. Pt progressed to performing 2 standing trials with maxA x2 and then mod A x2.     Pt would benefit from intensive inpatient rehabilitation for: Dynamic/static standing/sitting balance through skilled balance training, strengthening with the use of skilled therapeutic exercises interventions, mobility and safety training to ensure safe discharge home through skilled patient and caregiver education home management training. Pt highly motivated to return to independent PLOF and can tolerate 3+hours of therapy. Pt continues to benefit from a collaborative PT/OT/SLP program to improve quality of life and focus on recovery of impairments.      Rehab Prognosis: Good; patient would benefit from acute skilled PT services to address these deficits and reach maximum level of function.    Recent Surgery: Procedure(s) (LRB):  CREATION, TRACHEOSTOMY (N/A)  INSERTION, CENTRAL VENOUS ACCESS  DEVICE 4 Days Post-Op    Plan:     During this hospitalization, patient to be seen 5 x/week to address the identified rehab impairments via gait training, therapeutic activities, therapeutic exercises, neuromuscular re-education and progress toward the following goals:    · Plan of Care Expires:  08/20/22    Subjective     Chief Complaint: none verbalied  Patient/Family Comments/goals: pet reporting his legs are weak   Pain/Comfort:  · Pain Rating 1: 0/10  · Pain Rating Post-Intervention 1: 0/10      Objective:     Communicated with RN prior to session.  Patient found HOB elevated with blood pressure cuff, sun catheter, NG tube, oxygen, peripheral IV, pulse ox (continuous), telemetry, Tracheostomy, LVAD, wound vac upon PT entry to room.     General Precautions: Standard, fall   Orthopedic Precautions:N/A   Braces: N/A     Functional Mobility:  · Bed Mobility:     · Scooting: CGA  · Supine to Sit: minimum assistance  · Sit to Supine: moderate assistance  · Transfers:     · Sit to Stand #1:  maximal assistance and of 2 persons with no AD and HHA  · Sit to Stand #2:  moderate assistance and of 2 persons with no AD and hand-held assist  · Sit to Stand #3:  moderate assistance and of 2 persons with no AD and hand-held assist  · BLE knees and legs shaking  · Gait: RACHANA this date  · Balance:   · Static Sitting: CGA  · Dynamic Sitting: CGA  · Static Standing: modA-maxAx2  · Dynamic Standing: N/T      AM-PAC 6 CLICK MOBILITY  Turning over in bed (including adjusting bedclothes, sheets and blankets)?: 3  Sitting down on and standing up from a chair with arms (e.g., wheelchair, bedside commode, etc.): 2  Moving from lying on back to sitting on the side of the bed?: 3  Moving to and from a bed to a chair (including a wheelchair)?: 2  Need to walk in hospital room?: 1  Climbing 3-5 steps with a railing?: 1  Basic Mobility Total Score: 12       Therapeutic Activities and Exercises:  Patient also educated and instructed on  therapeutic exercises. Therapeutic exercises included the following: Long Arc Quads, Seated marches (Single leg), Bilateral Heel raises. Pt performed 1x10 on BLEs.  Patient educated on role of therapy, goals of session, and benefits of mobilizing.   Discussed PT plan of care during hospitalization.   Patient educated on calling for assistance.   Patient educated on how their diagnosis impacts their mobility within PT scope of practice.   Communication board up to date.  All questions answered within PT scope of practice.    Patient left HOB elevated with all lines intact, call button in reach and RN notified..    GOALS:   Multidisciplinary Problems     Physical Therapy Goals        Problem: Physical Therapy    Goal Priority Disciplines Outcome Goal Variances Interventions   Physical Therapy Goal     PT, PT/OT Ongoing, Progressing     Description: Goals to be met by: 22    Patient will increase functional independence with mobility by performin. Supine to sit with MInimal Assistance -not met  2. Sit to stand transfer with Contact Guard Assistance -not met  3. Gait  x 150 feet with Contact Guard Assistance with approved assistive device -not met  4. Ascend/descend 8 stair with right Handrails Contact Guard Assistance -not met  5. Sitting at edge of bed x15 minutes with Contact Guard Assistance to improve tolerance to activities. -not met  6. Lower extremity exercise program x15 reps with assistance as needed -not met               Multidisciplinary Problems (Resolved)        Problem: Physical Therapy    Goal Priority Disciplines Outcome Goal Variances Interventions   Physical Therapy Goal   (Resolved)     PT, PT/OT Met     Description: The patient is near his functional baseline, he ambulated within the room with no AD and independence. Unable to do stair training, assess gait endurance due to COVID restrictions. He is safe to return home with no therapy needs. He is in agreement with PT discharge, he  states he is confident he can ascend/descend his stairs.           Problem: Physical Therapy    Goal Priority Disciplines Outcome Goal Variances Interventions   Physical Therapy Goal   (Resolved)     PT, PT/OT Met                     Time Tracking:     PT Received On: 08/08/22  PT Start Time: 1119     PT Stop Time: 1152  PT Total Time (min): 33 min     Billable Minutes: Therapeutic Activity 10 and Therapeutic Exercise 15       PT/PTA: PT     PTA Visit Number: 0     08/08/2022

## 2022-08-08 NOTE — ASSESSMENT & PLAN NOTE
Key History and Diagnostic Findings  - History of AIT type 2 (TRAb and TSI negative; Thyroid US unremarkable with low vascularity)  - Weaned off methimazole and prednisone by May 2020, then developed hypothyroidism, Levothyroxine started and dose titrated per TFTs. Prior to current admission he was on Levothyroxine 112 mcg daily  - Labs consistent with hypothyroidism until current admission, initially on 7/13, with low TSH and elevated fT4. Repeat TFTs on 7/27 with worsening hyperthyroidism. He continued to receive LT4 112 mcg through 7/28. He remains on amiodarone for episodes of Ventricular Tachycardia  - Suspect current hyperthyroidism is AIT, however continued exogenous LT4 certainly contribute to current presentation   - Free T4 improving today at 2.43   Lab Results   Component Value Date    TSH <0.010 (L) 07/27/2022    TSH 0.111 (L) 07/13/2022    TSH 4.079 (H) 03/17/2022    FREET4 2.43 (H) 08/08/2022    FREET4 2.51 (H) 08/07/2022    FREET4 2.95 (H) 08/06/2022     Plan  - Strongly suspect AIT (type 2); continuing empiric treatment for both  - Heparin can cause a false elevation in free T4 as it displaces free T4 from TBG; will follow-up direct dialysis result which may take up to 1 week  - Continue Methimazole 20 mg TID   - Continue Prednisone 60 mg daily  - Continue Cholestyramine 4g QID  - Continue checking daily free T4

## 2022-08-08 NOTE — SUBJECTIVE & OBJECTIVE
Interval History: Patient seen at bedside. Alert and able to communicate via nodding and attempting to verbalize words with trach in place. Denies abdominal pain, shortness of breath, diarrhea, or secretions. Ongoing leukocytosis. Notably remains on high dose steroids. Has been afebrile with no new focus of infection. Off pressors.     Review of Systems   Constitutional:  Negative for chills, fatigue, fever and unexpected weight change.   HENT:  Negative for congestion, postnasal drip and trouble swallowing.    Respiratory:  Negative for cough, chest tightness and shortness of breath.    Cardiovascular:  Negative for chest pain, palpitations and leg swelling.   Gastrointestinal:  Negative for abdominal pain, blood in stool, constipation, diarrhea, nausea and vomiting.   Genitourinary:  Negative for difficulty urinating, dysuria and hematuria.   Musculoskeletal:  Negative for arthralgias and back pain.   Neurological:  Negative for dizziness and light-headedness.   Psychiatric/Behavioral:  Negative for confusion.    Objective:     Vital Signs (Most Recent):  Temp: 96.44 °F (35.8 °C) (08/08/22 1515)  Pulse: 73 (08/08/22 1530)  Resp: (!) 22 (08/08/22 1530)  BP: (!) 74/0 (08/08/22 1100)  SpO2: 96 % (08/08/22 1100)   Vital Signs (24h Range):  Temp:  [96.44 °F (35.8 °C)-97.7 °F (36.5 °C)] 96.44 °F (35.8 °C)  Pulse:  [73-92] 73  Resp:  [18-34] 22  SpO2:  [96 %-99 %] 96 %  BP: (72-84)/(0) 74/0  Arterial Line BP: ()/() 91/79     Weight: 81.8 kg (180 lb 5.4 oz)  Body mass index is 23.79 kg/m².    Estimated Creatinine Clearance: 47.2 mL/min (A) (based on SCr of 2 mg/dL (H)).    Physical Exam  Constitutional:       General: He is not in acute distress.     Appearance: Normal appearance. He is well-developed. He is not ill-appearing or diaphoretic.   Cardiovascular:      Rate and Rhythm: Normal rate and regular rhythm.      Pulses: Normal pulses.      Heart sounds: Normal heart sounds. No murmur heard.    No friction  rub. No gallop.   Pulmonary:      Effort: Pulmonary effort is normal. No respiratory distress.      Breath sounds: Normal breath sounds. No stridor.      Comments: Tracheostomy intact with no notable secretions   Abdominal:      General: Abdomen is flat. There is no distension.      Palpations: Abdomen is soft.      Tenderness: There is no abdominal tenderness.      Comments: Old right sided DLES dry on inspection  Left sided DLES bandage clean, dry, and intact    Skin:     General: Skin is warm and dry.      Comments: Midline chest incision healing    Neurological:      Mental Status: He is alert.       Significant Labs: All pertinent labs within the past 24 hours have been reviewed.    Significant Imaging: I have reviewed all pertinent imaging results/findings within the past 24 hours.

## 2022-08-08 NOTE — PLAN OF CARE
"SICU PLAN OF CARE NOTE    Dx: Left ventricular assist device (LVAD) complication    Goals of Care:  MAP 65-85    Vital Signs:  BP (!) 74/0 (BP Location: Right arm, Patient Position: Lying)   Pulse 73   Temp 96.44 °F (35.8 °C)   Resp (!) 22   Ht 6' 1" (1.854 m)   Wt 81.8 kg (180 lb 5.4 oz)   SpO2 96%   BMI 23.79 kg/m²     Cardiac:  NSR, AICD in place    Resp:  SpO2 100% on current vent settings; Spontaneous 5.0 PEEP and 30% FiO2. Pt tolerated well during shift.     Neuro:  AAO x4, Follows Commands and Moves All Extremities, pt able to mouth responses and uses communication board as well.     Gtts:  Heparin @ 2,000 units   TPN @ 35 cc    Urine Output:  Urinary Catheter 1140 cc/shift, sun removed per MD    Drains:  Wound vac in place, site remains CDI, scant drainage during shift.     VAD all VAD data charted Q1h, see flowsheets for details. No alarms or VAD events during shift.     Diet:  TPN @ 35 and TF increased to goal of 30cc/hr. Pt tolerating well with no complaints of nausea or vomiting, WCTM.      Labs/Accuchecks:  All labs trended reviewed and replaced accordingly.     Skin:  All skin monitored for redness and breakdown during shift. See flowsheet for skin integrity details, wound vac in place to R groin, dressing changes performed as needed, all pressur points protected, weight shift assistance provided, immerse mattress plugged in and working properly, no new injuries during shift.       "

## 2022-08-08 NOTE — NURSING
DLES pic received from RN. DLES is healing well and looks much better. Please see pictures below.

## 2022-08-08 NOTE — PT/OT/SLP PROGRESS
Occupational Therapy   Treatment    Name: Tim Richards  MRN: [id number]  Admitting Diagnosis:  Left ventricular assist device (LVAD) complication  4 Days Post-Op    Recommendations:     Discharge Recommendations: rehabilitation facility  Discharge Equipment Recommendations:   (TBD)  Barriers to discharge:  None    Assessment:     Tim Richards is a 56 y.o. male with a medical diagnosis of Left ventricular assist device (LVAD) complication. Performance deficits affecting function are weakness, impaired endurance, impaired self care skills, impaired functional mobility, gait instability, impaired balance, decreased coordination, decreased upper extremity function, decreased lower extremity function, decreased safety awareness.     Rehab Prognosis:  Good; patient would benefit from acute skilled OT services to address these deficits and reach maximum level of function.       Plan:     Patient to be seen 5 x/week to address the above listed problems via self-care/home management, therapeutic activities, therapeutic exercises, neuromuscular re-education  · Plan of Care Expires: 08/25/22  · Plan of Care Reviewed with: patient    Subjective     Pain/Comfort:  · Pain Rating 1: 0/10    Objective:     Communicated with: rn prior to session.  Patient found supine with blood pressure cuff, sun catheter, NG tube, oxygen, peripheral IV, pulse ox (continuous), telemetry, Tracheostomy upon OT entry to room.    General Precautions: Standard, droplet, respiratory   Orthopedic Precautions:N/A   Braces:    Respiratory Status: trach     Occupational Performance:     Bed Mobility:    · Patient completed Scooting/Bridging with contact guard assistance  · Patient completed Supine to Sit with minimum assistance  · Patient completed Sit to Supine with moderate assistance     Functional Mobility/Transfers:  · Patient completed Sit <> Stand Transfer with moderate assistance, maximal assistance and of 2 persons  with  no assistive  device (3 trials).    Activities of Daily Living:  · Grooming: stand by assistance to wash face sitting EOB.  · Lower Body Dressing: total assistance     AMPAC 6 Click ADL: 12    Treatment & Education:  Pt sat EOB ~ 15 minutes with SBA and completed ADLs and BUE AROM. C/o dizziness which subsided in time.  Discussed OT POC and progress.    Patient left supine with all lines intact and call button in reachEducation:      GOALS:   Multidisciplinary Problems     Occupational Therapy Goals        Problem: Occupational Therapy    Goal Priority Disciplines Outcome Interventions   Occupational Therapy Goal     OT, PT/OT Ongoing, Progressing    Description: Goals to be met by: 8/9/22     Patient will increase functional independence with ADLs by performing:    UE Dressing with Stand-by Assistance.  LE Dressing with Stand-by Assistance.  Grooming while standing at sink with Stand-by Assistance.  Toileting from toilet with Stand-by Assistance for hygiene and clothing management.   Toilet transfer to toilet with Stand-by Assistance.               Multidisciplinary Problems (Resolved)        Problem: Occupational Therapy    Goal Priority Disciplines Outcome Interventions   Occupational Therapy Goal   (Resolved)     OT, PT/OT Met           Problem: Occupational Therapy    Goal Priority Disciplines Outcome Interventions   Occupational Therapy Goal   (Resolved)     OT, PT/OT Met                    Time Tracking:     OT Date of Treatment: 08/08/22  OT Start Time: 1119  OT Stop Time: 1152  OT Total Time (min): 33 min    Billable Minutes:Therapeutic Exercise 12  Neuromuscular Re-education 21    OT/CARY: OT          8/8/2022

## 2022-08-08 NOTE — ASSESSMENT & PLAN NOTE
Key History and Diagnostic Findings  - Hyperglycemia noted once starting TPN in addition to steroids  - No prior history of diabetes; most recent A1c of 5.4 on 04/26/2021  - Changing from TPN to tube feeds on 08/05/2022    Plan  - Continue levemir 6 units QHS  - Continue on Aspart 5 units every 4 hour with low correction while on Tube Feeds  - Please notify endocrine if any change in tube feeds as this will change insulin requirements.  - Please hold insulin if tube feeds are held for some reason  - Please ensure that accuchecks are being documented every 4 hours

## 2022-08-08 NOTE — CARE UPDATE
Care Update Note    On lasix 5mg/hr, epi 0.03    Hemodynamics at 2100 on 8/7  MAP:85  SVO2: 68  CVP: 7  CO: 8.5  CI: 4.1  SVR: 740     UOP: 150 cc/hr    - continue current plan.     Case discussed with on call attending.      Taco Jeronimo MD  Cardiology Fellow, PGY4

## 2022-08-09 LAB
ALBUMIN SERPL BCP-MCNC: 2.4 G/DL (ref 3.5–5.2)
ALBUMIN SERPL BCP-MCNC: 2.4 G/DL (ref 3.5–5.2)
ALBUMIN SERPL BCP-MCNC: 2.5 G/DL (ref 3.5–5.2)
ALLENS TEST: ABNORMAL
ALP SERPL-CCNC: 133 U/L (ref 55–135)
ALP SERPL-CCNC: 144 U/L (ref 55–135)
ALP SERPL-CCNC: 144 U/L (ref 55–135)
ALT SERPL W/O P-5'-P-CCNC: 52 U/L (ref 10–44)
ALT SERPL W/O P-5'-P-CCNC: 56 U/L (ref 10–44)
ALT SERPL W/O P-5'-P-CCNC: 57 U/L (ref 10–44)
ANION GAP SERPL CALC-SCNC: 12 MMOL/L (ref 8–16)
ANION GAP SERPL CALC-SCNC: 8 MMOL/L (ref 8–16)
ANION GAP SERPL CALC-SCNC: 8 MMOL/L (ref 8–16)
ANISOCYTOSIS BLD QL SMEAR: SLIGHT
APTT BLDCRRT: 41.3 SEC (ref 21–32)
APTT BLDCRRT: 45.6 SEC (ref 21–32)
APTT BLDCRRT: 53.7 SEC (ref 21–32)
AST SERPL-CCNC: 36 U/L (ref 10–40)
AST SERPL-CCNC: 38 U/L (ref 10–40)
AST SERPL-CCNC: 46 U/L (ref 10–40)
BASOPHILS # BLD AUTO: 0.06 K/UL (ref 0–0.2)
BASOPHILS # BLD AUTO: 0.07 K/UL (ref 0–0.2)
BASOPHILS # BLD AUTO: 0.08 K/UL (ref 0–0.2)
BASOPHILS # BLD AUTO: 0.08 K/UL (ref 0–0.2)
BASOPHILS NFR BLD: 0.2 % (ref 0–1.9)
BILIRUB SERPL-MCNC: 4.5 MG/DL (ref 0.1–1)
BILIRUB SERPL-MCNC: 4.9 MG/DL (ref 0.1–1)
BILIRUB SERPL-MCNC: 5.2 MG/DL (ref 0.1–1)
BUN SERPL-MCNC: 70 MG/DL (ref 6–20)
BUN SERPL-MCNC: 72 MG/DL (ref 6–20)
BUN SERPL-MCNC: 72 MG/DL (ref 6–20)
CALCIUM SERPL-MCNC: 9.6 MG/DL (ref 8.7–10.5)
CALCIUM SERPL-MCNC: 9.8 MG/DL (ref 8.7–10.5)
CALCIUM SERPL-MCNC: 9.8 MG/DL (ref 8.7–10.5)
CHLORIDE SERPL-SCNC: 102 MMOL/L (ref 95–110)
CHLORIDE SERPL-SCNC: 105 MMOL/L (ref 95–110)
CHLORIDE SERPL-SCNC: 106 MMOL/L (ref 95–110)
CO2 SERPL-SCNC: 23 MMOL/L (ref 23–29)
CO2 SERPL-SCNC: 24 MMOL/L (ref 23–29)
CO2 SERPL-SCNC: 24 MMOL/L (ref 23–29)
CREAT SERPL-MCNC: 1.8 MG/DL (ref 0.5–1.4)
CREAT SERPL-MCNC: 1.9 MG/DL (ref 0.5–1.4)
CREAT SERPL-MCNC: 2 MG/DL (ref 0.5–1.4)
DELSYS: ABNORMAL
DIFFERENTIAL METHOD: ABNORMAL
EOSINOPHIL # BLD AUTO: 0 K/UL (ref 0–0.5)
EOSINOPHIL NFR BLD: 0 % (ref 0–8)
EOSINOPHIL NFR BLD: 0.1 % (ref 0–8)
ERYTHROCYTE [DISTWIDTH] IN BLOOD BY AUTOMATED COUNT: 19 % (ref 11.5–14.5)
ERYTHROCYTE [DISTWIDTH] IN BLOOD BY AUTOMATED COUNT: 19.1 % (ref 11.5–14.5)
EST. GFR  (NO RACE VARIABLE): 38.7 ML/MIN/1.73 M^2
EST. GFR  (NO RACE VARIABLE): 41.1 ML/MIN/1.73 M^2
EST. GFR  (NO RACE VARIABLE): 43.9 ML/MIN/1.73 M^2
FACT X PPP CHRO-ACNC: 0.32 IU/ML (ref 0.3–0.7)
FACT X PPP CHRO-ACNC: 0.38 IU/ML (ref 0.3–0.7)
FACT X PPP CHRO-ACNC: 0.49 IU/ML (ref 0.3–0.7)
FACT X PPP CHRO-ACNC: 0.5 IU/ML (ref 0.3–0.7)
FIBRINOGEN PPP-MCNC: 514 MG/DL (ref 182–400)
FIO2: 30
GLUCOSE SERPL-MCNC: 103 MG/DL (ref 70–110)
GLUCOSE SERPL-MCNC: 127 MG/DL (ref 70–110)
GLUCOSE SERPL-MCNC: 145 MG/DL (ref 70–110)
HCO3 UR-SCNC: 28.8 MMOL/L (ref 24–28)
HCT VFR BLD AUTO: 30.2 % (ref 40–54)
HCT VFR BLD AUTO: 32 % (ref 40–54)
HCT VFR BLD AUTO: 32.1 % (ref 40–54)
HCT VFR BLD AUTO: 32.2 % (ref 40–54)
HGB BLD-MCNC: 8.9 G/DL (ref 14–18)
HGB BLD-MCNC: 8.9 G/DL (ref 14–18)
HGB BLD-MCNC: 9.1 G/DL (ref 14–18)
HGB BLD-MCNC: 9.3 G/DL (ref 14–18)
HYPOCHROMIA BLD QL SMEAR: ABNORMAL
IMM GRANULOCYTES # BLD AUTO: 1.42 K/UL (ref 0–0.04)
IMM GRANULOCYTES # BLD AUTO: 1.6 K/UL (ref 0–0.04)
IMM GRANULOCYTES # BLD AUTO: 1.76 K/UL (ref 0–0.04)
IMM GRANULOCYTES # BLD AUTO: 1.8 K/UL (ref 0–0.04)
IMM GRANULOCYTES NFR BLD AUTO: 4.4 % (ref 0–0.5)
IMM GRANULOCYTES NFR BLD AUTO: 4.5 % (ref 0–0.5)
IMM GRANULOCYTES NFR BLD AUTO: 4.9 % (ref 0–0.5)
IMM GRANULOCYTES NFR BLD AUTO: 4.9 % (ref 0–0.5)
INR PPP: 1.1 (ref 0.8–1.2)
INR PPP: 1.2 (ref 0.8–1.2)
LDH SERPL L TO P-CCNC: 267 U/L (ref 110–260)
LYMPHOCYTES # BLD AUTO: 0.5 K/UL (ref 1–4.8)
LYMPHOCYTES # BLD AUTO: 0.6 K/UL (ref 1–4.8)
LYMPHOCYTES # BLD AUTO: 0.8 K/UL (ref 1–4.8)
LYMPHOCYTES # BLD AUTO: 1.1 K/UL (ref 1–4.8)
LYMPHOCYTES NFR BLD: 1.5 % (ref 18–48)
LYMPHOCYTES NFR BLD: 1.7 % (ref 18–48)
LYMPHOCYTES NFR BLD: 2.3 % (ref 18–48)
LYMPHOCYTES NFR BLD: 2.9 % (ref 18–48)
MAGNESIUM SERPL-MCNC: 2.8 MG/DL (ref 1.6–2.6)
MAGNESIUM SERPL-MCNC: 2.9 MG/DL (ref 1.6–2.6)
MAGNESIUM SERPL-MCNC: 2.9 MG/DL (ref 1.6–2.6)
MCH RBC QN AUTO: 27.2 PG (ref 27–31)
MCH RBC QN AUTO: 27.8 PG (ref 27–31)
MCH RBC QN AUTO: 28 PG (ref 27–31)
MCH RBC QN AUTO: 28.5 PG (ref 27–31)
MCHC RBC AUTO-ENTMCNC: 27.8 G/DL (ref 32–36)
MCHC RBC AUTO-ENTMCNC: 28.3 G/DL (ref 32–36)
MCHC RBC AUTO-ENTMCNC: 28.9 G/DL (ref 32–36)
MCHC RBC AUTO-ENTMCNC: 29.5 G/DL (ref 32–36)
MCV RBC AUTO: 97 FL (ref 82–98)
MCV RBC AUTO: 97 FL (ref 82–98)
MCV RBC AUTO: 98 FL (ref 82–98)
MCV RBC AUTO: 98 FL (ref 82–98)
MODE: ABNORMAL
MONOCYTES # BLD AUTO: 0.7 K/UL (ref 0.3–1)
MONOCYTES # BLD AUTO: 0.9 K/UL (ref 0.3–1)
MONOCYTES # BLD AUTO: 1.3 K/UL (ref 0.3–1)
MONOCYTES # BLD AUTO: 1.5 K/UL (ref 0.3–1)
MONOCYTES NFR BLD: 2.3 % (ref 4–15)
MONOCYTES NFR BLD: 2.7 % (ref 4–15)
MONOCYTES NFR BLD: 3.5 % (ref 4–15)
MONOCYTES NFR BLD: 4 % (ref 4–15)
NEUTROPHILS # BLD AUTO: 29.3 K/UL (ref 1.8–7.7)
NEUTROPHILS # BLD AUTO: 32.2 K/UL (ref 1.8–7.7)
NEUTROPHILS # BLD AUTO: 32.3 K/UL (ref 1.8–7.7)
NEUTROPHILS # BLD AUTO: 32.6 K/UL (ref 1.8–7.7)
NEUTROPHILS NFR BLD: 87.9 % (ref 38–73)
NEUTROPHILS NFR BLD: 89.1 % (ref 38–73)
NEUTROPHILS NFR BLD: 91.1 % (ref 38–73)
NEUTROPHILS NFR BLD: 91.4 % (ref 38–73)
NRBC BLD-RTO: 0 /100 WBC
NRBC BLD-RTO: 1 /100 WBC
OVALOCYTES BLD QL SMEAR: ABNORMAL
PCO2 BLDA: 49.8 MMHG (ref 35–45)
PEEP: 5
PH SMN: 7.37 [PH] (ref 7.35–7.45)
PHOSPHATE SERPL-MCNC: 4.1 MG/DL (ref 2.7–4.5)
PHOSPHATE SERPL-MCNC: 4.4 MG/DL (ref 2.7–4.5)
PHOSPHATE SERPL-MCNC: 4.5 MG/DL (ref 2.7–4.5)
PLATELET # BLD AUTO: 285 K/UL (ref 150–450)
PLATELET # BLD AUTO: 331 K/UL (ref 150–450)
PLATELET # BLD AUTO: 332 K/UL (ref 150–450)
PLATELET # BLD AUTO: 343 K/UL (ref 150–450)
PMV BLD AUTO: 12 FL (ref 9.2–12.9)
PMV BLD AUTO: 12.2 FL (ref 9.2–12.9)
PMV BLD AUTO: 12.3 FL (ref 9.2–12.9)
PMV BLD AUTO: 12.4 FL (ref 9.2–12.9)
PO2 BLDA: 36 MMHG (ref 40–60)
POC BE: 4 MMOL/L
POC SATURATED O2: 66 % (ref 95–100)
POC TCO2: 30 MMOL/L (ref 24–29)
POCT GLUCOSE: 106 MG/DL (ref 70–110)
POCT GLUCOSE: 112 MG/DL (ref 70–110)
POCT GLUCOSE: 127 MG/DL (ref 70–110)
POCT GLUCOSE: 131 MG/DL (ref 70–110)
POCT GLUCOSE: 136 MG/DL (ref 70–110)
POCT GLUCOSE: 169 MG/DL (ref 70–110)
POIKILOCYTOSIS BLD QL SMEAR: SLIGHT
POLYCHROMASIA BLD QL SMEAR: ABNORMAL
POTASSIUM SERPL-SCNC: 5.4 MMOL/L (ref 3.5–5.1)
POTASSIUM SERPL-SCNC: 5.4 MMOL/L (ref 3.5–5.1)
POTASSIUM SERPL-SCNC: 5.5 MMOL/L (ref 3.5–5.1)
PROT SERPL-MCNC: 6.4 G/DL (ref 6–8.4)
PROT SERPL-MCNC: 6.6 G/DL (ref 6–8.4)
PROT SERPL-MCNC: 6.7 G/DL (ref 6–8.4)
PROTHROMBIN TIME: 11.3 SEC (ref 9–12.5)
PROTHROMBIN TIME: 11.4 SEC (ref 9–12.5)
PROTHROMBIN TIME: 11.6 SEC (ref 9–12.5)
PROTHROMBIN TIME: 12.5 SEC (ref 9–12.5)
PS: 8
RBC # BLD AUTO: 3.12 M/UL (ref 4.6–6.2)
RBC # BLD AUTO: 3.27 M/UL (ref 4.6–6.2)
RBC # BLD AUTO: 3.27 M/UL (ref 4.6–6.2)
RBC # BLD AUTO: 3.32 M/UL (ref 4.6–6.2)
SAMPLE: ABNORMAL
SITE: ABNORMAL
SODIUM SERPL-SCNC: 137 MMOL/L (ref 136–145)
SODIUM SERPL-SCNC: 137 MMOL/L (ref 136–145)
SODIUM SERPL-SCNC: 138 MMOL/L (ref 136–145)
SP02: 98
SPHEROCYTES BLD QL SMEAR: ABNORMAL
T4 FREE SERPL-MCNC: 2.23 NG/DL (ref 0.71–1.51)
WBC # BLD AUTO: 32.02 K/UL (ref 3.9–12.7)
WBC # BLD AUTO: 35.37 K/UL (ref 3.9–12.7)
WBC # BLD AUTO: 36.26 K/UL (ref 3.9–12.7)
WBC # BLD AUTO: 37.05 K/UL (ref 3.9–12.7)

## 2022-08-09 PROCEDURE — 80053 COMPREHEN METABOLIC PANEL: CPT | Mod: 91 | Performed by: THORACIC SURGERY (CARDIOTHORACIC VASCULAR SURGERY)

## 2022-08-09 PROCEDURE — 25000003 PHARM REV CODE 250: Performed by: INTERNAL MEDICINE

## 2022-08-09 PROCEDURE — 99900035 HC TECH TIME PER 15 MIN (STAT)

## 2022-08-09 PROCEDURE — 83735 ASSAY OF MAGNESIUM: CPT | Mod: 91 | Performed by: THORACIC SURGERY (CARDIOTHORACIC VASCULAR SURGERY)

## 2022-08-09 PROCEDURE — 99900026 HC AIRWAY MAINTENANCE (STAT)

## 2022-08-09 PROCEDURE — 84100 ASSAY OF PHOSPHORUS: CPT | Mod: 91 | Performed by: THORACIC SURGERY (CARDIOTHORACIC VASCULAR SURGERY)

## 2022-08-09 PROCEDURE — 20000000 HC ICU ROOM

## 2022-08-09 PROCEDURE — 63600175 PHARM REV CODE 636 W HCPCS: Performed by: INTERNAL MEDICINE

## 2022-08-09 PROCEDURE — 63600175 PHARM REV CODE 636 W HCPCS

## 2022-08-09 PROCEDURE — 83615 LACTATE (LD) (LDH) ENZYME: CPT | Performed by: STUDENT IN AN ORGANIZED HEALTH CARE EDUCATION/TRAINING PROGRAM

## 2022-08-09 PROCEDURE — 85610 PROTHROMBIN TIME: CPT | Performed by: THORACIC SURGERY (CARDIOTHORACIC VASCULAR SURGERY)

## 2022-08-09 PROCEDURE — 25000003 PHARM REV CODE 250: Performed by: PHYSICIAN ASSISTANT

## 2022-08-09 PROCEDURE — 99291 PR CRITICAL CARE, E/M 30-74 MINUTES: ICD-10-PCS | Mod: ,,, | Performed by: INTERNAL MEDICINE

## 2022-08-09 PROCEDURE — 87205 SMEAR GRAM STAIN: CPT | Performed by: INTERNAL MEDICINE

## 2022-08-09 PROCEDURE — 27000644 HC VENT IN-LINE ADAPTER

## 2022-08-09 PROCEDURE — 99233 SBSQ HOSP IP/OBS HIGH 50: CPT | Mod: ,,, | Performed by: INTERNAL MEDICINE

## 2022-08-09 PROCEDURE — 87040 BLOOD CULTURE FOR BACTERIA: CPT | Performed by: INTERNAL MEDICINE

## 2022-08-09 PROCEDURE — 82803 BLOOD GASES ANY COMBINATION: CPT

## 2022-08-09 PROCEDURE — 94003 VENT MGMT INPAT SUBQ DAY: CPT

## 2022-08-09 PROCEDURE — 99233 PR SUBSEQUENT HOSPITAL CARE,LEVL III: ICD-10-PCS | Mod: ,,, | Performed by: INTERNAL MEDICINE

## 2022-08-09 PROCEDURE — 27000221 HC OXYGEN, UP TO 24 HOURS

## 2022-08-09 PROCEDURE — 25000003 PHARM REV CODE 250

## 2022-08-09 PROCEDURE — C9113 INJ PANTOPRAZOLE SODIUM, VIA: HCPCS

## 2022-08-09 PROCEDURE — 85520 HEPARIN ASSAY: CPT | Mod: 91 | Performed by: THORACIC SURGERY (CARDIOTHORACIC VASCULAR SURGERY)

## 2022-08-09 PROCEDURE — 85025 COMPLETE CBC W/AUTO DIFF WBC: CPT | Mod: 91 | Performed by: INTERNAL MEDICINE

## 2022-08-09 PROCEDURE — 85025 COMPLETE CBC W/AUTO DIFF WBC: CPT | Performed by: THORACIC SURGERY (CARDIOTHORACIC VASCULAR SURGERY)

## 2022-08-09 PROCEDURE — 85730 THROMBOPLASTIN TIME PARTIAL: CPT | Mod: 91 | Performed by: INTERNAL MEDICINE

## 2022-08-09 PROCEDURE — 87075 CULTR BACTERIA EXCEPT BLOOD: CPT | Performed by: STUDENT IN AN ORGANIZED HEALTH CARE EDUCATION/TRAINING PROGRAM

## 2022-08-09 PROCEDURE — 25000003 PHARM REV CODE 250: Performed by: STUDENT IN AN ORGANIZED HEALTH CARE EDUCATION/TRAINING PROGRAM

## 2022-08-09 PROCEDURE — 25000242 PHARM REV CODE 250 ALT 637 W/ HCPCS: Performed by: THORACIC SURGERY (CARDIOTHORACIC VASCULAR SURGERY)

## 2022-08-09 PROCEDURE — 63600175 PHARM REV CODE 636 W HCPCS: Performed by: STUDENT IN AN ORGANIZED HEALTH CARE EDUCATION/TRAINING PROGRAM

## 2022-08-09 PROCEDURE — 94640 AIRWAY INHALATION TREATMENT: CPT

## 2022-08-09 PROCEDURE — 84439 ASSAY OF FREE THYROXINE: CPT | Performed by: STUDENT IN AN ORGANIZED HEALTH CARE EDUCATION/TRAINING PROGRAM

## 2022-08-09 PROCEDURE — 87070 CULTURE OTHR SPECIMN AEROBIC: CPT | Mod: 59 | Performed by: INTERNAL MEDICINE

## 2022-08-09 PROCEDURE — 87070 CULTURE OTHR SPECIMN AEROBIC: CPT | Performed by: STUDENT IN AN ORGANIZED HEALTH CARE EDUCATION/TRAINING PROGRAM

## 2022-08-09 PROCEDURE — 25000242 PHARM REV CODE 250 ALT 637 W/ HCPCS

## 2022-08-09 PROCEDURE — 85730 THROMBOPLASTIN TIME PARTIAL: CPT | Performed by: THORACIC SURGERY (CARDIOTHORACIC VASCULAR SURGERY)

## 2022-08-09 PROCEDURE — 85384 FIBRINOGEN ACTIVITY: CPT | Performed by: THORACIC SURGERY (CARDIOTHORACIC VASCULAR SURGERY)

## 2022-08-09 PROCEDURE — 25000003 PHARM REV CODE 250: Performed by: THORACIC SURGERY (CARDIOTHORACIC VASCULAR SURGERY)

## 2022-08-09 PROCEDURE — 94761 N-INVAS EAR/PLS OXIMETRY MLT: CPT

## 2022-08-09 PROCEDURE — 99291 CRITICAL CARE FIRST HOUR: CPT | Mod: ,,, | Performed by: INTERNAL MEDICINE

## 2022-08-09 PROCEDURE — 85610 PROTHROMBIN TIME: CPT | Mod: 91 | Performed by: INTERNAL MEDICINE

## 2022-08-09 PROCEDURE — 27000248 HC VAD-ADDITIONAL DAY

## 2022-08-09 RX ORDER — AMIODARONE HYDROCHLORIDE 200 MG/1
400 TABLET ORAL DAILY
Status: DISCONTINUED | OUTPATIENT
Start: 2022-08-10 | End: 2022-08-23

## 2022-08-09 RX ORDER — HEPARIN SODIUM,PORCINE/D5W 25000/250
0-40 INTRAVENOUS SOLUTION INTRAVENOUS CONTINUOUS
Status: DISCONTINUED | OUTPATIENT
Start: 2022-08-09 | End: 2022-08-10

## 2022-08-09 RX ADMIN — CHOLESTYRAMINE 4 G: 4 POWDER, FOR SUSPENSION ORAL at 05:08

## 2022-08-09 RX ADMIN — CHOLESTYRAMINE 4 G: 4 POWDER, FOR SUSPENSION ORAL at 01:08

## 2022-08-09 RX ADMIN — HYDROXYZINE HYDROCHLORIDE 25 MG: 25 TABLET ORAL at 01:08

## 2022-08-09 RX ADMIN — GUAIFENESIN 300 MG: 100 SOLUTION ORAL at 11:08

## 2022-08-09 RX ADMIN — INSULIN ASPART 5 UNITS: 100 INJECTION, SOLUTION INTRAVENOUS; SUBCUTANEOUS at 04:08

## 2022-08-09 RX ADMIN — MEXILETINE HYDROCHLORIDE 400 MG: 200 CAPSULE ORAL at 02:08

## 2022-08-09 RX ADMIN — LEVALBUTEROL HYDROCHLORIDE 0.63 MG: 0.63 SOLUTION RESPIRATORY (INHALATION) at 07:08

## 2022-08-09 RX ADMIN — METHIMAZOLE 20 MG: 10 TABLET ORAL at 09:08

## 2022-08-09 RX ADMIN — MEXILETINE HYDROCHLORIDE 400 MG: 200 CAPSULE ORAL at 05:08

## 2022-08-09 RX ADMIN — CEFEPIME 2 G: 2 INJECTION, POWDER, FOR SOLUTION INTRAVENOUS at 10:08

## 2022-08-09 RX ADMIN — INSULIN ASPART 5 UNITS: 100 INJECTION, SOLUTION INTRAVENOUS; SUBCUTANEOUS at 05:08

## 2022-08-09 RX ADMIN — HEPARIN SODIUM 2000 UNITS/HR: 5000 INJECTION INTRAVENOUS; SUBCUTANEOUS at 02:08

## 2022-08-09 RX ADMIN — CHOLESTYRAMINE 4 G: 4 POWDER, FOR SUSPENSION ORAL at 09:08

## 2022-08-09 RX ADMIN — LEVALBUTEROL HYDROCHLORIDE 0.63 MG: 0.63 SOLUTION RESPIRATORY (INHALATION) at 12:08

## 2022-08-09 RX ADMIN — GUAIFENESIN 300 MG: 100 SOLUTION ORAL at 05:08

## 2022-08-09 RX ADMIN — HEPARIN SODIUM 20 UNITS/KG/HR: 5000 INJECTION INTRAVENOUS; SUBCUTANEOUS at 10:08

## 2022-08-09 RX ADMIN — MEXILETINE HYDROCHLORIDE 400 MG: 200 CAPSULE ORAL at 09:08

## 2022-08-09 RX ADMIN — HYDROXYZINE HYDROCHLORIDE 25 MG: 25 TABLET ORAL at 05:08

## 2022-08-09 RX ADMIN — HEPARIN SODIUM 20 UNITS/KG/HR: 5000 INJECTION INTRAVENOUS; SUBCUTANEOUS at 07:08

## 2022-08-09 RX ADMIN — ACETYLCYSTEINE 4 ML: 100 SOLUTION ORAL; RESPIRATORY (INHALATION) at 12:08

## 2022-08-09 RX ADMIN — PANTOPRAZOLE SODIUM 40 MG: 40 INJECTION, POWDER, FOR SOLUTION INTRAVENOUS at 09:08

## 2022-08-09 RX ADMIN — METHIMAZOLE 20 MG: 10 TABLET ORAL at 04:08

## 2022-08-09 RX ADMIN — INSULIN ASPART 5 UNITS: 100 INJECTION, SOLUTION INTRAVENOUS; SUBCUTANEOUS at 08:08

## 2022-08-09 RX ADMIN — MIDODRINE HYDROCHLORIDE 15 MG: 5 TABLET ORAL at 05:08

## 2022-08-09 RX ADMIN — METRONIDAZOLE 500 MG: 500 TABLET ORAL at 05:08

## 2022-08-09 RX ADMIN — ACETYLCYSTEINE 4 ML: 100 SOLUTION ORAL; RESPIRATORY (INHALATION) at 07:08

## 2022-08-09 RX ADMIN — INSULIN DETEMIR 6 UNITS: 100 INJECTION, SOLUTION SUBCUTANEOUS at 08:08

## 2022-08-09 RX ADMIN — ASPIRIN 81 MG CHEWABLE TABLET 81 MG: 81 TABLET CHEWABLE at 09:08

## 2022-08-09 RX ADMIN — MEROPENEM 2 G: 1 INJECTION INTRAVENOUS at 10:08

## 2022-08-09 RX ADMIN — MEROPENEM 2 G: 1 INJECTION INTRAVENOUS at 02:08

## 2022-08-09 RX ADMIN — PREDNISONE 60 MG: 20 TABLET ORAL at 09:08

## 2022-08-09 RX ADMIN — INSULIN ASPART 5 UNITS: 100 INJECTION, SOLUTION INTRAVENOUS; SUBCUTANEOUS at 11:08

## 2022-08-09 RX ADMIN — AMIODARONE HYDROCHLORIDE 400 MG: 200 TABLET ORAL at 09:08

## 2022-08-09 NOTE — PT/OT/SLP PROGRESS
Occupational Therapy      Patient Name:  Tim Richards   MRN:  [id number]    Patient not seen today secondary to ultrasound at bedside during OT attempt at 1106. Unable to make second attempt. Will follow-up as able.    8/9/2022

## 2022-08-09 NOTE — NURSING
Rounded on patient. ID, Dr. Mullins also at bedside. No family at bedside.    Pt's WBC has trended up since last week. New cultures taken including DLES. I assessed DLES myself; site is healing with scant amount of yellowish drainage. Old DLES is healing properly. VAD numbers WNL, no alarms on interrogation.

## 2022-08-09 NOTE — PLAN OF CARE
SICU PLAN OF CARE NOTE     Dx: Left ventricular assist device (LVAD) complication     Goals of Care:  MAP 65-85     Cardiac:  NSR, AICD in place     Resp:  SpO2 100% on trach collar for majority of shift;  Pt tolerated well during shift.      Neuro:  AAO x4, Follows Commands and Moves All Extremities, pt able to mouth responses and uses communication board as well.      Gtts:  Heparin @ 20 units/kg/hr    Urine Output:  ~650 cc/shift      Drains:  Wound vac site assessed by wound care RN at bedside, see consult notes. CDI, scant drainage during shift.      VAD all VAD data charted Q1h, see flowsheets for details. No alarms or VAD events during shift.      Diet:  TF increased to 50cc/hr. Pt tolerating well with no complaints of nausea or vomiting, WCTM.      Labs/Accuchecks:  All labs trended reviewed and replaced accordingly.  Heparin gtt order modified and ptt trended Q6h.      Skin:  All skin monitored for redness and breakdown during shift. See flowsheet for skin integrity details, wound vac removed from R groin, cultures sent from driveline site, MD notified of bleeding at trach site, guaze changed as needed and changes made to heparin gtt per new orders, dressing changes performed as needed, all pressure points protected, weight shift assistance provided, immerse mattress plugged in and working properly, no new injuries during shift.

## 2022-08-09 NOTE — ASSESSMENT & PLAN NOTE
Leukocytosis with associated fever up to 100.9 F status post creation of tracheostomy on 8/4/22. Blood cultures this admission have been unrevealing. Only notable pathogen isolated was Klebsiella aerogenes from sputum for which patient has been treated with cefepime. Metronidazole was added for anaerobic coverage. 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 Old DLES inspected at bedside today and found to be dry with no drainage. Newer left sided DLES bandage seen dry, clean, and intact with no drainage reported by nursing. DLES cultures performed on 7/25 showed no growth. CT c/a/p 7/29 with no evidence of infection. 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 First dose 80 mg now tapered down to 60 mg. No evidence of new infectious process. Patient denies any alarm symptoms today. Remains afebrile off all pressors. Leukocytosis is likely multifactorial in setting of recent surgeries, high dose steroids, thyrotoxicosis.      Recommendations  - will stop cefepime / flagyl, start meropenem and assess response   - will send blood and respiratory cultures today - although pt overall well appearing  - primary team discussing steroid dose with endocrine  - old DLES site evaluated, no evidence of acute infection  - New DLES w/ some drainage noted this morning per RN - will obtain culture on next dressing change  - wound vac on R groin - please ensure wound care is following up for appropriate vac changes  - removing central line today, pt switched to enteral feeds and no longer on TPN  - If patient will be on glucocorticoid dose equivalent to ?20 mg of prednisone daily for one month or longer then would recommend starting PJP prophylaxis with PO TMP//80 mg daily or renally dosed by pharmacy     Discussed plan w/ bedside RN and LVAD coordinator     Will follow

## 2022-08-09 NOTE — SUBJECTIVE & OBJECTIVE
Interval History: per RN, worsening drainage on DLES dressing compared to prior. WBC remains elevated, no fevers or new complaints. Minimal respiratory secretions per RN. Some bleeding from trach site noted. Wound vac to R groin. LVAD coordinator also at bedside. Removing centra line today.    Review of Systems   Unable to perform ROS: Acuity of condition   Objective:     Vital Signs (Most Recent):  Temp: 98.5 °F (36.9 °C) (08/09/22 1500)  Pulse: 70 (08/09/22 1600)  Resp: (!) 26 (08/09/22 1600)  BP: (!) 90/0 (08/09/22 1500)  SpO2:  (UTO) (08/09/22 1510)   Vital Signs (24h Range):  Temp:  [97.5 °F (36.4 °C)-98.5 °F (36.9 °C)] 98.5 °F (36.9 °C)  Pulse:  [69-83] 70  Resp:  [15-30] 26  SpO2:  [95 %-98 %] 97 %  BP: (74-90)/(0) 90/0  Arterial Line BP: ()/(59-89) 89/75     Weight: 80.9 kg (178 lb 5.6 oz)  Body mass index is 23.53 kg/m².    Estimated Creatinine Clearance: 47.2 mL/min (A) (based on SCr of 2 mg/dL (H)).    Physical Exam  Constitutional:       General: He is not in acute distress.     Appearance: Normal appearance. He is well-developed. He is not ill-appearing or diaphoretic.   HENT:      Head: Normocephalic and atraumatic.      Right Ear: External ear normal.      Left Ear: External ear normal.      Nose: Nose normal.   Eyes:      General: No scleral icterus.        Right eye: No discharge.         Left eye: No discharge.      Extraocular Movements: Extraocular movements intact.      Conjunctiva/sclera: Conjunctivae normal.   Cardiovascular:      Rate and Rhythm: Normal rate and regular rhythm.      Pulses: Normal pulses.      Heart sounds: Normal heart sounds. No murmur heard.    No friction rub. No gallop.   Pulmonary:      Effort: Pulmonary effort is normal. No respiratory distress.      Breath sounds: Normal breath sounds. No stridor.      Comments: Bleeding from trach noted  Abdominal:      General: Abdomen is flat. Bowel sounds are normal. There is no distension.      Palpations: Abdomen is soft.       Tenderness: There is no abdominal tenderness.      Comments: Old right sided DLES dry on inspection  Left sided DLES bandage clean, dry, and intact    Skin:     General: Skin is warm and dry.      Coloration: Skin is not jaundiced or pale.      Findings: No erythema.      Comments: Midline chest incision healing   R groin wound vac   Neurological:      General: No focal deficit present.      Mental Status: He is alert and oriented to person, place, and time. Mental status is at baseline.   Psychiatric:         Mood and Affect: Mood normal.         Behavior: Behavior normal.         Thought Content: Thought content normal.         Judgment: Judgment normal.       Significant Labs: CBC:   Recent Labs   Lab 08/09/22  0329 08/09/22  0800 08/09/22  1351   WBC 36.26* 37.05* 35.37*   HGB 9.1* 9.3* 8.9*   HCT 32.1* 32.2* 32.0*    343 332       CMP:   Recent Labs   Lab 08/08/22  2030 08/09/22  0329 08/09/22  1351    138 137   K 5.2* 5.4* 5.4*    102 105   CO2 26 24 24   * 103 127*   BUN 70* 72* 72*   CREATININE 1.9* 1.8* 2.0*   CALCIUM 9.4 9.8 9.8   PROT 6.6 6.7 6.6   ALBUMIN 2.4* 2.5* 2.4*   BILITOT 5.1* 5.2* 4.9*   ALKPHOS 131 133 144*   AST 36 36 46*   ALT 52* 52* 57*   ANIONGAP 10 12 8       Microbiology Results (last 7 days)       Procedure Component Value Units Date/Time    Aerobic culture [[id number]] Collected: 08/09/22 1347    Order Status: Sent Specimen: Incision site from Abdomen Updated: 08/09/22 1443    Culture, Anaerobe [[id number]] Collected: 08/09/22 1347    Order Status: Sent Specimen: Incision site from Abdomen Updated: 08/09/22 1443    Culture, Respiratory with Gram Stain [[id number]] Collected: 08/09/22 1026    Order Status: Completed Specimen: Respiratory from Endotracheal Aspirate Updated: 08/09/22 1357     Gram Stain (Respiratory) <10 epithelial cells per low power field.     Gram Stain (Respiratory) Rare WBC's     Gram Stain (Respiratory) No organisms seen    Blood culture  [[id number]] Collected: 08/09/22 1102    Order Status: Sent Specimen: Blood from Peripheral, Antecubital, Left Updated: 08/09/22 1120    Blood culture [[id number]] Collected: 08/09/22 1045    Order Status: Sent Specimen: Blood from Peripheral, Hand, Right Updated: 08/09/22 1120    IV catheter culture [[id number]] Collected: 08/04/22 1525    Order Status: Completed Specimen: Catheter Tip, Intrajugular Updated: 08/06/22 0950     Aerobic Culture - Cath tip No growth            Significant Imaging: I have reviewed all pertinent imaging results/findings within the past 24 hours.

## 2022-08-09 NOTE — PLAN OF CARE
Endocrinology managing:    Amiodarone-induced hyperthyroidism  Key History and Diagnostic Findings  - History of AIT type 2 (TRAb and TSI negative; Thyroid US unremarkable with low vascularity)  - Weaned off methimazole and prednisone by May 2020, then developed hypothyroidism, Levothyroxine started and dose titrated per TFTs. Prior to current admission he was on Levothyroxine 112 mcg daily  - Labs consistent with hypothyroidism until current admission, initially on 7/13, with low TSH and elevated fT4. Repeat TFTs on 7/27 with worsening hyperthyroidism. He continued to receive LT4 112 mcg through 7/28. He remains on amiodarone for episodes of Ventricular Tachycardia  - Suspect current hyperthyroidism is AIT, however continued exogenous LT4 certainly contribute to current presentation   - Free T4 improving today at 2.23 ng/dl    Plan  - Strongly suspect AIT (type 2); continuing empiric treatment for both  - Heparin can cause a false elevation in free T4 as it displaces free T4 from TBG; will follow-up direct dialysis result   - Continue Methimazole 20 mg TID   - Continue Prednisone 60 mg daily  - Continue checking daily free T4        Hyperglycemia  Key History and Diagnostic Findings  - Hyperglycemia noted once started on feeds in addition to steroids  - No prior history of diabetes; most recent A1c of 5.4 on 04/26/2021  - Changed from TPN to tube feeds on 08/05/2022     Plan  - Continue levemir 6 units QHS  - Continue on Aspart 5 units every 4 hour with low correction while on Tube Feeds  - Please notify endocrine if any change in tube feeds as this will change insulin requirements.  - Please hold insulin if tube feeds are held for some reason  - Please ensure that accuchecks are being documented every 4 hours       [facility], 6th Floor  [address], [place], [address]    The above history labs imaging impression and plan were discussed with attending  physician who is in agreement and also took part in this patient's care.  I personally reviewed all of the patients available medications, labs, imaging, vitals, allergies, medical history.

## 2022-08-09 NOTE — PROGRESS NOTES
[facility]  Infectious Disease  Progress Note    Patient Name: Tim Richards  MRN: [id number]  Admission Date: 6/27/2022  Length of Stay: 43 days  Attending Physician: Isaiah Santizo MD  Primary Care Provider: Deyanira Booth MD    Isolation Status: No active isolations  Assessment/Plan:      Leukocytosis  Leukocytosis with associated fever up to 100.9 F status post creation of tracheostomy on 8/4/22. Blood cultures this admission have been unrevealing. Only notable pathogen isolated was Klebsiella aerogenes from sputum for which patient has been treated with cefepime. Metronidazole was added for anaerobic coverage. 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 Old DLES inspected at bedside today and found to be dry with no drainage. Newer left sided DLES bandage seen dry, clean, and intact with no drainage reported by nursing. DLES cultures performed on 7/25 showed no growth. CT c/a/p 7/29 with no evidence of infection. 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 First dose 80 mg now tapered down to 60 mg. No evidence of new infectious process. Patient denies any alarm symptoms today. Remains afebrile off all pressors. Leukocytosis is likely multifactorial in setting of recent surgeries, high dose steroids, thyrotoxicosis.      Recommendations  - will stop cefepime / flagyl, start meropenem and assess response   - will send blood and respiratory cultures today - although pt overall well appearing  - primary team discussing steroid dose with endocrine  - old DLES site evaluated, no evidence of acute infection  - New DLES w/ some drainage noted this morning per RN - will obtain culture on next dressing change  - wound vac on R groin - please ensure wound care is following up for appropriate vac changes  - removing central line today, pt switched to enteral feeds and no longer on TPN  - If patient will be  "on glucocorticoid dose equivalent to ?20 mg of prednisone daily for one month or longer then would recommend starting PJP prophylaxis with PO TMP//80 mg daily or renally dosed by pharmacy     Discussed plan w/ bedside RN and LVAD coordinator     Will follow       Anticipated Disposition: [facility]    Thank you for your consult. I will follow-up with patient. Please contact us if you have any additional questions.    Nelly Mullins DO  Critical Care Infectious Disease    Time: 35 minutes   50% of time spent on face-to-face counseling and coordination of care. Counseling included review of test results, diagnosis, and treatment plan with patient and/or family.        Subjective:     Principal Problem:Left ventricular assist device (LVAD) complication    HPI: A 56-year-old man with HFrEF-10%, s/p VAD HM2 (March 2018), ICD, VT on amiodarone who developed malaise, anorexia, SOB, dark urine, and soft stools 3-4 days prior to admission. [place] [facility] [place] which time he tested positive for Covid-19 infection. He was admitted for further management and started on Remdesivir treatment protocol. Since admission, he feels significantly improved with bowel movements returning to normal and the shortness of breath subsiding.     Infectious Diseases consulted for "covid infection, acute. Patient with LVAD"    ID reconsulted 7/21 for "sepsis and consideration for fungemia". Since pt was last seen by ID, patient underwent AV repair, redo sternotomy, explant of old lvad HM2 with implantation of HM3, and placed on VA-ECMO, takeback 7/14 for mediastinal washout/hematoma, and washout, sternal closure, creation of moises-pericardium (gerotex membrane) and wound vac placement on 7/15. Decannulated ECMO on 7/19 with subsequent fever and hypotension. Pt was placed on broad spectrum abx. Blcx obtained from 7/20 ngtd. S/p bronch on 7/20 - cx with normal respiratory sachin.     ID reconsulted 8/5 for "On cefepime and flagyl day 10 for " "klebsiella. Spiking fever. Has LVAD. WBC trending up (also on steroid though). Tip culture from CVC in process."      Interval History: per RN, worsening drainage on DLES dressing compared to prior. WBC remains elevated, no fevers or new complaints. Minimal respiratory secretions per RN. Some bleeding from trach site noted. Wound vac to R groin. LVAD coordinator also at bedside. Removing centra line today.    Review of Systems   Unable to perform ROS: Acuity of condition   Objective:     Vital Signs (Most Recent):  Temp: 98.5 °F (36.9 °C) (08/09/22 1500)  Pulse: 70 (08/09/22 1600)  Resp: (!) 26 (08/09/22 1600)  BP: (!) 90/0 (08/09/22 1500)  SpO2:  (UTO) (08/09/22 1510)   Vital Signs (24h Range):  Temp:  [97.5 °F (36.4 °C)-98.5 °F (36.9 °C)] 98.5 °F (36.9 °C)  Pulse:  [69-83] 70  Resp:  [15-30] 26  SpO2:  [95 %-98 %] 97 %  BP: (74-90)/(0) 90/0  Arterial Line BP: ()/(59-89) 89/75     Weight: 80.9 kg (178 lb 5.6 oz)  Body mass index is 23.53 kg/m².    Estimated Creatinine Clearance: 47.2 mL/min (A) (based on SCr of 2 mg/dL (H)).    Physical Exam  Constitutional:       General: He is not in acute distress.     Appearance: Normal appearance. He is well-developed. He is not ill-appearing or diaphoretic.   HENT:      Head: Normocephalic and atraumatic.      Right Ear: External ear normal.      Left Ear: External ear normal.      Nose: Nose normal.   Eyes:      General: No scleral icterus.        Right eye: No discharge.         Left eye: No discharge.      Extraocular Movements: Extraocular movements intact.      Conjunctiva/sclera: Conjunctivae normal.   Cardiovascular:      Rate and Rhythm: Normal rate and regular rhythm.      Pulses: Normal pulses.      Heart sounds: Normal heart sounds. No murmur heard.    No friction rub. No gallop.   Pulmonary:      Effort: Pulmonary effort is normal. No respiratory distress.      Breath sounds: Normal breath sounds. No stridor.      Comments: Bleeding from trach " noted  Abdominal:      General: Abdomen is flat. Bowel sounds are normal. There is no distension.      Palpations: Abdomen is soft.      Tenderness: There is no abdominal tenderness.      Comments: Old right sided DLES dry on inspection  Left sided DLES bandage clean, dry, and intact    Skin:     General: Skin is warm and dry.      Coloration: Skin is not jaundiced or pale.      Findings: No erythema.      Comments: Midline chest incision healing   R groin wound vac   Neurological:      General: No focal deficit present.      Mental Status: He is alert and oriented to person, place, and time. Mental status is at baseline.   Psychiatric:         Mood and Affect: Mood normal.         Behavior: Behavior normal.         Thought Content: Thought content normal.         Judgment: Judgment normal.       Significant Labs: CBC:   Recent Labs   Lab 08/09/22  0329 08/09/22  0800 08/09/22  1351   WBC 36.26* 37.05* 35.37*   HGB 9.1* 9.3* 8.9*   HCT 32.1* 32.2* 32.0*    343 332       CMP:   Recent Labs   Lab 08/08/22  2030 08/09/22  0329 08/09/22  1351    138 137   K 5.2* 5.4* 5.4*    102 105   CO2 26 24 24   * 103 127*   BUN 70* 72* 72*   CREATININE 1.9* 1.8* 2.0*   CALCIUM 9.4 9.8 9.8   PROT 6.6 6.7 6.6   ALBUMIN 2.4* 2.5* 2.4*   BILITOT 5.1* 5.2* 4.9*   ALKPHOS 131 133 144*   AST 36 36 46*   ALT 52* 52* 57*   ANIONGAP 10 12 8       Microbiology Results (last 7 days)       Procedure Component Value Units Date/Time    Aerobic culture [[id number]] Collected: 08/09/22 1347    Order Status: Sent Specimen: Incision site from Abdomen Updated: 08/09/22 1443    Culture, Anaerobe [[id number]] Collected: 08/09/22 1347    Order Status: Sent Specimen: Incision site from Abdomen Updated: 08/09/22 1443    Culture, Respiratory with Gram Stain [[id number]] Collected: 08/09/22 1026    Order Status: Completed Specimen: Respiratory from Endotracheal Aspirate Updated: 08/09/22 1357     Gram Stain (Respiratory) <10 epithelial  cells per low power field.     Gram Stain (Respiratory) Rare WBC's     Gram Stain (Respiratory) No organisms seen    Blood culture [[id number]] Collected: 08/09/22 1102    Order Status: Sent Specimen: Blood from Peripheral, Antecubital, Left Updated: 08/09/22 1120    Blood culture [[id number]] Collected: 08/09/22 1045    Order Status: Sent Specimen: Blood from Peripheral, Hand, Right Updated: 08/09/22 1120    IV catheter culture [[id number]] Collected: 08/04/22 1525    Order Status: Completed Specimen: Catheter Tip, Intrajugular Updated: 08/06/22 0950     Aerobic Culture - Cath tip No growth            Significant Imaging: I have reviewed all pertinent imaging results/findings within the past 24 hours.

## 2022-08-09 NOTE — PROGRESS NOTES
08/09/2022  Carissa Dean    Current provider:  Isaiah Santizo MD    Device interrogation:  TXP LVAD INTERROGATIONS 8/9/2022 8/9/2022 8/9/2022 8/9/2022 8/9/2022 8/9/2022 8/9/2022   Type HeartMate3 HeartMate3 HeartMate3 HeartMate3 HeartMate3 HeartMate3 HeartMate3   Flow 5.7 5.6 5.6 5.4 5.4 5.5 5.6   Speed 6300 6300 6300 6300 6300 6300 6300   PI 2.2 2.1 1.4 1.9 2.0 1.9 1.8   Power (Jackson) 5.4 5.4 5.3 5.4 5.4 5.4 5.4   LSL 5900 5900 5900 5900 5900 5900 5900   Low Flow Alarm - - - - - - -   Pulsatility Intermittent pulse Intermittent pulse Intermittent pulse Intermittent pulse Intermittent pulse Intermittent pulse Intermittent pulse          Rounded on Tim Richards to ensure all mechanical assist device settings (IABP or VAD) were appropriate and all parameters were within limits.  I was able to ensure all back up equipment was present, the staff had no issues, and the Perfusion Department daily rounding was complete.      For implantable VADs: Interrogation of Ventricular assist device was performed with analysis of device parameters and review of device function. I have personally reviewed the interrogation findings and agree with findings as stated.     In emergency, the nursing units have been notified to contact the perfusion department either by:  Calling [phone] from 630am to 4pm Mon thru Fri, utilizing the On-Call Finder functionality of Epic and searching for Perfusion, or by contacting the hospital  from 4pm to 630am and on weekends and asking to speak with the perfusionist on call.    9:41 AM

## 2022-08-09 NOTE — ANESTHESIA POSTPROCEDURE EVALUATION
Anesthesia Post Evaluation    Patient: Tim Richards    Procedure(s) Performed: Procedure(s) (LRB):  CREATION, TRACHEOSTOMY (N/A)  INSERTION, CENTRAL VENOUS ACCESS DEVICE    Final Anesthesia Type: general      Patient location during evaluation: PACU  Patient participation: Yes- Able to Participate  Level of consciousness: awake and alert and oriented  Pain management: adequate  Airway patency: patent    PONV status at discharge: No PONV  Anesthetic complications: no      Cardiovascular status: blood pressure returned to baseline and hemodynamically stable  Respiratory status: unassisted  Hydration status: euvolemic  Follow-up not needed.          Vitals Value Taken Time   BP 76/0 08/09/22 0345   Temp 36.4 °C (97.5 °F) 08/09/22 0700   Pulse 77 08/09/22 1003   Resp 22 08/09/22 1003   SpO2 98 % 08/09/22 0732   Vitals shown include unvalidated device data.      No case tracking events are documented in the log.      Pain/Iker Score: Pain Rating Prior to Med Admin: 4 (8/8/2022  7:48 PM)  Pain Rating Post Med Admin: 0 (8/8/2022  8:18 PM)

## 2022-08-09 NOTE — PROGRESS NOTES
[facility]  Heart Failure  Progress Note     Hospital Length of Stay: 43  Interval History:  - No significant events overnight. Tolerating PO intake. Nausea has resolved. Denies any abd discomfort.   - No fever in last 96 hour. WBC is still very high.   - No significant event in LVAD recorded.   . Will titrate the tube feeding till it reaches goal.    Hemodynamics:   SvO2 of 66, CVP 4      Vitals:  Temp:  [96.26 °F (35.7 °C)-97.8 °F (36.6 °C)] 97.5 °F (36.4 °C)  Pulse:  [69-92] 75  Resp:  [15-34] 26  SpO2:  [96 %-100 %] 98 %  BP: (74-85)/(0) 76/0  Arterial Line BP: ()/(59-86) 82/71    Intake/Output    Intake/Output Summary (Last 24 hours) at 8/9/2022 1118  Last data filed at 8/9/2022 0900  Gross per 24 hour   Intake 1767.03 ml   Output 1558 ml   Net 209.03 ml     Physical Exam  Gen: Trach in place, follows  command.   HEENT: Pupils equal and reactive to light  Cardio: Regular rate, point of maximal impulse not displaced,1+ radial pulses bilaterally, 1+ DP pulses bilaterally, VAD hum obstructing heart sounds  Resp: No additional sound heard.   Abd: Soft, non-tender, non-distended  Skin: Warm,  trace peripheral edema  Neuro: No gross abnormalities.   Psych: unable to assess     Labs:  Recent Labs   Lab 08/08/22 2030 08/09/22  0329 08/09/22  0800   WBC 32.01* 36.26* 37.05*   HGB 8.9* 9.1* 9.3*   HCT 31.3* 32.1* 32.2*    331 343     Recent Labs   Lab 08/08/22  1230 08/08/22 2030 08/09/22  0329    138 138   K 4.6 5.2* 5.4*   CL 99 102 102   CO2 27 26 24   BUN 65* 70* 72*   CREATININE 2.0* 1.9* 1.8*   * 139* 103   CALCIUM 9.8 9.4 9.8   MG 2.6 2.8* 2.8*   PHOS 3.2 4.4 4.4       Micro:    Current Meds:   acetylcysteine 100 mg/ml (10%)  4 mL Nebulization Q6H    [START ON 8/10/2022] amiodarone  400 mg Oral Daily    aspirin  81 mg Per OG tube Daily    cholestyramine  1 packet Per NG tube QID    guaiFENesin 100 mg/5 ml  300 mg Per NG tube Q6H    insulin aspart U-100  5 Units  Subcutaneous 6 times per day    insulin detemir U-100  6 Units Subcutaneous QHS    levalbuterol  0.63 mg Nebulization Q6H    magnesium oxide  400 mg Oral BID    meropenem (MERREM) IVPB  2 g Intravenous Q8H    methIMAzole  20 mg Per NG tube TID    mexiletine  400 mg Oral Q8H    ondansetron  4 mg Intravenous Once    pantoprazole  40 mg Intravenous BID    predniSONE  60 mg Per NG tube Daily       Continuous Infusions:   dextrose 10 % in water (D10W)      heparin (porcine) in D5W 20 Units/kg/hr (08/09/22 1023)     Assessment and Plan:  Cardiogenic Shock  -Previous HM2, underwent pump exchange to HM3 on 7/13/22 complicated by worsening CS/RV failure requiring VA ECMO now decannulated  -Re-intubated on 7/29/22 due to hypercapnic resp failure  - Plan to wean to trach collar.      LVAD  TXP LVAD INTERROGATIONS 7/31/2022 7/31/2022 7/31/2022 7/31/2022 7/31/2022 7/31/2022 7/31/2022   Type HeartMate3 HeartMate3 HeartMate3 HeartMate3 HeartMate3 HeartMate3 HeartMate3   Flow 5.3 5.5 5.3 5.3 5.3 5.4 5.5   Speed 6200 6200 6200 6200 6200 6200 6200   PI 3.2 3.3 3.3 3.3 3.1 3.2 3.1   Power (Jackson) 5.2 5.1 5.1 5.2 5.1 5.1 5.1   LSL 5800 - - - 5800 - -   Low Flow Alarm - - - - - - -   Pulsatility Intermittent pulse - - - Intermittent pulse - -     Fever: No fever in 96  hours.   Still has leucocytosis.   ID team on board.   Changed to meropenem today.   XR and blood culture ordered today.      History of ventricular tachycardia/Episode of A flutter 2:1 block  Patient experienced an episode of VT on 6/30 and experienced an ICD shock thought to be related to hypokalemia (K of 3.1 on 6/30)  Aggressively replete K, keep K>4 and Mg>2  Continue mexiletine PO.  Amio gtt transitioned to PO.     COVID-19 virus infection  -Previously hypoxic then recovered  -ID was consulted, recommended Remdesivir, course completed     Elevated serum lactate dehydrogenase (LDH)  S/p HM3 exchange for HM2 pump thrombosis  Continue to trend LDH.       amiodarone induced hypothyrodism initially, now hyperthyroidism  -Endocrine team on board.  -On prednisone and methimazole.   - Prednisone  60 mg.   -Continue to monitor free T4.  - Medications has been changes as per Endocrinology team.        Chronic combined systolic and diastolic heart failure  ADHF  Underwent HM III upgrade on 7/13/22, currently on VA ECMO   Being treated for sepsis.   Currently trach ed. Trach placed on 8/4. (reintubarted 7/29)  Chest tube removal, bronch, and old driveline removed 7/22      Scot Card MD, FACP  Cardiovascular Disease Fellow PGY4  [facility]

## 2022-08-09 NOTE — PLAN OF CARE
"SICU PLAN OF CARE NOTE     Dx: Left ventricular assist device (LVAD) complication     Goals of Care:  MAP 65-85, K >4.5, Mg >2.5     Vital Signs:  BP (!) 78/0 (BP Location: Right arm, Patient Position: Lying)   Pulse 90   Temp 96.8 °F (36 °C)   Resp 20   Ht 6' 1" (1.854 m)   Wt 83.5 kg (184 lb)   SpO2 96%   BMI 24.28 kg/m²      Cardiac:  VAD HM3                          Speed: 6300 RPM                          Flow: 5.7-5.8                          PI: 1.6 - 2.4                          Power: 5.4-5.5    HCT updated in VAD with each new result  Resp:  Ventilator AC/VC 20/550/30%/5     Neuro:  AAO x4, Follows Commands and Moves All Extremities, Mouths all needs and uses communication board      Gtts:   Heparin 2000 units/hr               Urine Output:  Voids Spontaneously 460 cc / shift      Wound Vac 75 ml / shift     Diet:  NPO, Tube Feeds @ goal 30 ml/hr     Labs/Accuchecks:  BG Q4; Labs reviewed; VAD updated with most recent Hct.     Skin:  All skin remains free from new injury.  Patient turned Q2, Immerse bed in blace, ICU bed working correctly. Heels elevated on Pillow. Heel boots worn for a while. Patient requests them off for periods throughout night. Sacral Foam placed to patient buttock. Refer to flowsheets for further wound Assessment.     Shift Events:  Pt complains of no nausea or abd. Discomfort. Tolerating TF at this time. Heparin continues to be therapeutic. Afebrile this shift. Spontanious vent settings throughout shift until 0400. See flowsheet for further assessment/details.  Patient updated on current condition/plan of care, questions answered, and emotional support provided.   MD updated on current condition, vitals, labs, and gtts.   "

## 2022-08-09 NOTE — PT/OT/SLP PROGRESS
Physical Therapy      Patient Name:  Tim Richards   MRN:  [id number]    Patient not seen today secondary to ultrasound at bedside on PT attempt. Unable to make second attempt. Will follow-up as able.

## 2022-08-10 LAB
ABO + RH BLD: NORMAL
ALBUMIN SERPL BCP-MCNC: 2.2 G/DL (ref 3.5–5.2)
ALBUMIN SERPL BCP-MCNC: 2.3 G/DL (ref 3.5–5.2)
ALBUMIN SERPL BCP-MCNC: 2.4 G/DL (ref 3.5–5.2)
ALLENS TEST: ABNORMAL
ALP SERPL-CCNC: 147 U/L (ref 55–135)
ALP SERPL-CCNC: 150 U/L (ref 55–135)
ALP SERPL-CCNC: 152 U/L (ref 55–135)
ALT SERPL W/O P-5'-P-CCNC: 54 U/L (ref 10–44)
ALT SERPL W/O P-5'-P-CCNC: 54 U/L (ref 10–44)
ALT SERPL W/O P-5'-P-CCNC: 58 U/L (ref 10–44)
ANION GAP SERPL CALC-SCNC: 11 MMOL/L (ref 8–16)
ANION GAP SERPL CALC-SCNC: 7 MMOL/L (ref 8–16)
ANION GAP SERPL CALC-SCNC: 7 MMOL/L (ref 8–16)
APTT BLDCRRT: 23 SEC (ref 21–32)
APTT BLDCRRT: 24.7 SEC (ref 21–32)
APTT BLDCRRT: 27 SEC (ref 21–32)
APTT BLDCRRT: 27 SEC (ref 21–32)
APTT BLDCRRT: 32.1 SEC (ref 21–32)
AST SERPL-CCNC: 36 U/L (ref 10–40)
AST SERPL-CCNC: 39 U/L (ref 10–40)
AST SERPL-CCNC: 51 U/L (ref 10–40)
BASOPHILS # BLD AUTO: 0.05 K/UL (ref 0–0.2)
BASOPHILS # BLD AUTO: 0.09 K/UL (ref 0–0.2)
BASOPHILS NFR BLD: 0.2 % (ref 0–1.9)
BASOPHILS NFR BLD: 0.3 % (ref 0–1.9)
BILIRUB SERPL-MCNC: 3.7 MG/DL (ref 0.1–1)
BILIRUB SERPL-MCNC: 4.1 MG/DL (ref 0.1–1)
BILIRUB SERPL-MCNC: 4.3 MG/DL (ref 0.1–1)
BLD GP AB SCN CELLS X3 SERPL QL: NORMAL
BNP SERPL-MCNC: 216 PG/ML (ref 0–99)
BUN SERPL-MCNC: 67 MG/DL (ref 6–20)
BUN SERPL-MCNC: 67 MG/DL (ref 6–20)
BUN SERPL-MCNC: 71 MG/DL (ref 6–20)
CALCIUM SERPL-MCNC: 9.4 MG/DL (ref 8.7–10.5)
CALCIUM SERPL-MCNC: 9.4 MG/DL (ref 8.7–10.5)
CALCIUM SERPL-MCNC: 9.7 MG/DL (ref 8.7–10.5)
CHLORIDE SERPL-SCNC: 108 MMOL/L (ref 95–110)
CHLORIDE SERPL-SCNC: 111 MMOL/L (ref 95–110)
CHLORIDE SERPL-SCNC: 112 MMOL/L (ref 95–110)
CO2 SERPL-SCNC: 20 MMOL/L (ref 23–29)
CO2 SERPL-SCNC: 21 MMOL/L (ref 23–29)
CO2 SERPL-SCNC: 22 MMOL/L (ref 23–29)
CREAT SERPL-MCNC: 1.7 MG/DL (ref 0.5–1.4)
CREAT SERPL-MCNC: 1.8 MG/DL (ref 0.5–1.4)
CREAT SERPL-MCNC: 1.9 MG/DL (ref 0.5–1.4)
CRP SERPL-MCNC: 6.8 MG/L (ref 0–8.2)
DIFFERENTIAL METHOD: ABNORMAL
DIFFERENTIAL METHOD: ABNORMAL
EOSINOPHIL # BLD AUTO: 0 K/UL (ref 0–0.5)
EOSINOPHIL # BLD AUTO: 0.1 K/UL (ref 0–0.5)
EOSINOPHIL NFR BLD: 0 % (ref 0–8)
EOSINOPHIL NFR BLD: 0.1 % (ref 0–8)
ERYTHROCYTE [DISTWIDTH] IN BLOOD BY AUTOMATED COUNT: 19 % (ref 11.5–14.5)
ERYTHROCYTE [DISTWIDTH] IN BLOOD BY AUTOMATED COUNT: 19.1 % (ref 11.5–14.5)
EST. GFR  (NO RACE VARIABLE): 41.1 ML/MIN/1.73 M^2
EST. GFR  (NO RACE VARIABLE): 43.9 ML/MIN/1.73 M^2
EST. GFR  (NO RACE VARIABLE): 47 ML/MIN/1.73 M^2
FACT X PPP CHRO-ACNC: 0.13 IU/ML (ref 0.3–0.7)
FACT X PPP CHRO-ACNC: <0.1 IU/ML (ref 0.3–0.7)
FIBRINOGEN PPP-MCNC: 499 MG/DL (ref 182–400)
FINAL PATHOLOGIC DIAGNOSIS: NORMAL
GLUCOSE SERPL-MCNC: 126 MG/DL (ref 70–110)
GLUCOSE SERPL-MCNC: 133 MG/DL (ref 70–110)
GLUCOSE SERPL-MCNC: 98 MG/DL (ref 70–110)
GROSS: NORMAL
HCO3 UR-SCNC: 22.6 MMOL/L (ref 24–28)
HCT VFR BLD AUTO: 29.5 % (ref 40–54)
HCT VFR BLD AUTO: 30.7 % (ref 40–54)
HCT VFR BLD CALC: 29 %PCV (ref 36–54)
HGB BLD-MCNC: 8.8 G/DL (ref 14–18)
HGB BLD-MCNC: 8.9 G/DL (ref 14–18)
IMM GRANULOCYTES # BLD AUTO: 1.23 K/UL (ref 0–0.04)
IMM GRANULOCYTES # BLD AUTO: 1.44 K/UL (ref 0–0.04)
IMM GRANULOCYTES NFR BLD AUTO: 4.1 % (ref 0–0.5)
IMM GRANULOCYTES NFR BLD AUTO: 4.2 % (ref 0–0.5)
INR PPP: 1.1 (ref 0.8–1.2)
LDH SERPL L TO P-CCNC: 217 U/L (ref 110–260)
LYMPHOCYTES # BLD AUTO: 0.7 K/UL (ref 1–4.8)
LYMPHOCYTES # BLD AUTO: 0.8 K/UL (ref 1–4.8)
LYMPHOCYTES NFR BLD: 2.1 % (ref 18–48)
LYMPHOCYTES NFR BLD: 2.7 % (ref 18–48)
Lab: NORMAL
MAGNESIUM SERPL-MCNC: 2.9 MG/DL (ref 1.6–2.6)
MAGNESIUM SERPL-MCNC: 2.9 MG/DL (ref 1.6–2.6)
MAGNESIUM SERPL-MCNC: 3.1 MG/DL (ref 1.6–2.6)
MCH RBC QN AUTO: 27.4 PG (ref 27–31)
MCH RBC QN AUTO: 28.7 PG (ref 27–31)
MCHC RBC AUTO-ENTMCNC: 28.7 G/DL (ref 32–36)
MCHC RBC AUTO-ENTMCNC: 30.2 G/DL (ref 32–36)
MCV RBC AUTO: 95 FL (ref 82–98)
MCV RBC AUTO: 96 FL (ref 82–98)
MONOCYTES # BLD AUTO: 1.1 K/UL (ref 0.3–1)
MONOCYTES # BLD AUTO: 1.3 K/UL (ref 0.3–1)
MONOCYTES NFR BLD: 3.7 % (ref 4–15)
MONOCYTES NFR BLD: 3.9 % (ref 4–15)
NEUTROPHILS # BLD AUTO: 26.7 K/UL (ref 1.8–7.7)
NEUTROPHILS # BLD AUTO: 30.7 K/UL (ref 1.8–7.7)
NEUTROPHILS NFR BLD: 89.3 % (ref 38–73)
NEUTROPHILS NFR BLD: 89.4 % (ref 38–73)
NRBC BLD-RTO: 0 /100 WBC
NRBC BLD-RTO: 0 /100 WBC
PCO2 BLDA: 28.8 MMHG (ref 35–45)
PH SMN: 7.5 [PH] (ref 7.35–7.45)
PHOSPHATE SERPL-MCNC: 3 MG/DL (ref 2.7–4.5)
PHOSPHATE SERPL-MCNC: 3.9 MG/DL (ref 2.7–4.5)
PHOSPHATE SERPL-MCNC: 4.4 MG/DL (ref 2.7–4.5)
PLATELET # BLD AUTO: 279 K/UL (ref 150–450)
PLATELET # BLD AUTO: 283 K/UL (ref 150–450)
PMV BLD AUTO: 12 FL (ref 9.2–12.9)
PMV BLD AUTO: 12.3 FL (ref 9.2–12.9)
PO2 BLDA: 168 MMHG (ref 80–100)
POC BE: -1 MMOL/L
POC IONIZED CALCIUM: 1.31 MMOL/L (ref 1.06–1.42)
POC SATURATED O2: 100 % (ref 95–100)
POC TCO2: 23 MMOL/L (ref 23–27)
POCT GLUCOSE: 107 MG/DL (ref 70–110)
POCT GLUCOSE: 107 MG/DL (ref 70–110)
POCT GLUCOSE: 117 MG/DL (ref 70–110)
POCT GLUCOSE: 133 MG/DL (ref 70–110)
POCT GLUCOSE: 137 MG/DL (ref 70–110)
POCT GLUCOSE: 148 MG/DL (ref 70–110)
POTASSIUM BLD-SCNC: 4.4 MMOL/L (ref 3.5–5.1)
POTASSIUM SERPL-SCNC: 4.8 MMOL/L (ref 3.5–5.1)
POTASSIUM SERPL-SCNC: 5 MMOL/L (ref 3.5–5.1)
POTASSIUM SERPL-SCNC: 5.9 MMOL/L (ref 3.5–5.1)
PROT SERPL-MCNC: 6 G/DL (ref 6–8.4)
PROT SERPL-MCNC: 6.3 G/DL (ref 6–8.4)
PROT SERPL-MCNC: 6.3 G/DL (ref 6–8.4)
PROTHROMBIN TIME: 11.3 SEC (ref 9–12.5)
PROTHROMBIN TIME: 11.4 SEC (ref 9–12.5)
PROTHROMBIN TIME: 11.4 SEC (ref 9–12.5)
RBC # BLD AUTO: 3.1 M/UL (ref 4.6–6.2)
RBC # BLD AUTO: 3.21 M/UL (ref 4.6–6.2)
SAMPLE: ABNORMAL
SITE: ABNORMAL
SODIUM BLD-SCNC: 142 MMOL/L (ref 136–145)
SODIUM SERPL-SCNC: 139 MMOL/L (ref 136–145)
SODIUM SERPL-SCNC: 139 MMOL/L (ref 136–145)
SODIUM SERPL-SCNC: 141 MMOL/L (ref 136–145)
T4 FREE SERPL-MCNC: 2.12 NG/DL (ref 0.71–1.51)
TRIGL SERPL-MCNC: 55 MG/DL (ref 30–150)
WBC # BLD AUTO: 29.88 K/UL (ref 3.9–12.7)
WBC # BLD AUTO: 34.31 K/UL (ref 3.9–12.7)

## 2022-08-10 PROCEDURE — 25000003 PHARM REV CODE 250: Performed by: PHYSICIAN ASSISTANT

## 2022-08-10 PROCEDURE — 94640 AIRWAY INHALATION TREATMENT: CPT

## 2022-08-10 PROCEDURE — 82803 BLOOD GASES ANY COMBINATION: CPT

## 2022-08-10 PROCEDURE — 83735 ASSAY OF MAGNESIUM: CPT | Mod: 91 | Performed by: THORACIC SURGERY (CARDIOTHORACIC VASCULAR SURGERY)

## 2022-08-10 PROCEDURE — C9113 INJ PANTOPRAZOLE SODIUM, VIA: HCPCS

## 2022-08-10 PROCEDURE — 84295 ASSAY OF SERUM SODIUM: CPT

## 2022-08-10 PROCEDURE — 99233 PR SUBSEQUENT HOSPITAL CARE,LEVL III: ICD-10-PCS | Mod: ,,, | Performed by: INTERNAL MEDICINE

## 2022-08-10 PROCEDURE — 25000003 PHARM REV CODE 250

## 2022-08-10 PROCEDURE — 84478 ASSAY OF TRIGLYCERIDES: CPT | Performed by: INTERNAL MEDICINE

## 2022-08-10 PROCEDURE — 86850 RBC ANTIBODY SCREEN: CPT | Performed by: STUDENT IN AN ORGANIZED HEALTH CARE EDUCATION/TRAINING PROGRAM

## 2022-08-10 PROCEDURE — 85730 THROMBOPLASTIN TIME PARTIAL: CPT | Performed by: THORACIC SURGERY (CARDIOTHORACIC VASCULAR SURGERY)

## 2022-08-10 PROCEDURE — 97530 THERAPEUTIC ACTIVITIES: CPT

## 2022-08-10 PROCEDURE — 94003 VENT MGMT INPAT SUBQ DAY: CPT

## 2022-08-10 PROCEDURE — 80053 COMPREHEN METABOLIC PANEL: CPT | Performed by: THORACIC SURGERY (CARDIOTHORACIC VASCULAR SURGERY)

## 2022-08-10 PROCEDURE — 99232 SBSQ HOSP IP/OBS MODERATE 35: CPT | Mod: ,,, | Performed by: INTERNAL MEDICINE

## 2022-08-10 PROCEDURE — 37799 UNLISTED PX VASCULAR SURGERY: CPT

## 2022-08-10 PROCEDURE — 99233 SBSQ HOSP IP/OBS HIGH 50: CPT | Mod: ,,, | Performed by: INTERNAL MEDICINE

## 2022-08-10 PROCEDURE — 27000644 HC VENT IN-LINE ADAPTER

## 2022-08-10 PROCEDURE — 25000242 PHARM REV CODE 250 ALT 637 W/ HCPCS

## 2022-08-10 PROCEDURE — 63600175 PHARM REV CODE 636 W HCPCS: Performed by: STUDENT IN AN ORGANIZED HEALTH CARE EDUCATION/TRAINING PROGRAM

## 2022-08-10 PROCEDURE — 99900035 HC TECH TIME PER 15 MIN (STAT)

## 2022-08-10 PROCEDURE — 27000221 HC OXYGEN, UP TO 24 HOURS

## 2022-08-10 PROCEDURE — 85610 PROTHROMBIN TIME: CPT | Mod: 91 | Performed by: THORACIC SURGERY (CARDIOTHORACIC VASCULAR SURGERY)

## 2022-08-10 PROCEDURE — 93750 PR INTERROGATE VENT ASSIST DEV, IN PERSON, W PHYSICIAN ANALYSIS: ICD-10-PCS | Mod: ,,, | Performed by: INTERNAL MEDICINE

## 2022-08-10 PROCEDURE — 25000003 PHARM REV CODE 250: Performed by: INTERNAL MEDICINE

## 2022-08-10 PROCEDURE — 85730 THROMBOPLASTIN TIME PARTIAL: CPT | Mod: 91 | Performed by: STUDENT IN AN ORGANIZED HEALTH CARE EDUCATION/TRAINING PROGRAM

## 2022-08-10 PROCEDURE — 85730 THROMBOPLASTIN TIME PARTIAL: CPT | Mod: 91 | Performed by: INTERNAL MEDICINE

## 2022-08-10 PROCEDURE — 84132 ASSAY OF SERUM POTASSIUM: CPT

## 2022-08-10 PROCEDURE — 25000242 PHARM REV CODE 250 ALT 637 W/ HCPCS: Performed by: THORACIC SURGERY (CARDIOTHORACIC VASCULAR SURGERY)

## 2022-08-10 PROCEDURE — 25000003 PHARM REV CODE 250: Performed by: STUDENT IN AN ORGANIZED HEALTH CARE EDUCATION/TRAINING PROGRAM

## 2022-08-10 PROCEDURE — 97112 NEUROMUSCULAR REEDUCATION: CPT

## 2022-08-10 PROCEDURE — 85520 HEPARIN ASSAY: CPT | Mod: 91 | Performed by: THORACIC SURGERY (CARDIOTHORACIC VASCULAR SURGERY)

## 2022-08-10 PROCEDURE — 82330 ASSAY OF CALCIUM: CPT

## 2022-08-10 PROCEDURE — 85384 FIBRINOGEN ACTIVITY: CPT | Performed by: THORACIC SURGERY (CARDIOTHORACIC VASCULAR SURGERY)

## 2022-08-10 PROCEDURE — 82565 ASSAY OF CREATININE: CPT

## 2022-08-10 PROCEDURE — 93750 INTERROGATION VAD IN PERSON: CPT | Mod: ,,, | Performed by: INTERNAL MEDICINE

## 2022-08-10 PROCEDURE — 63600175 PHARM REV CODE 636 W HCPCS: Performed by: INTERNAL MEDICINE

## 2022-08-10 PROCEDURE — 99900026 HC AIRWAY MAINTENANCE (STAT)

## 2022-08-10 PROCEDURE — 27000248 HC VAD-ADDITIONAL DAY

## 2022-08-10 PROCEDURE — 84439 ASSAY OF FREE THYROXINE: CPT | Performed by: STUDENT IN AN ORGANIZED HEALTH CARE EDUCATION/TRAINING PROGRAM

## 2022-08-10 PROCEDURE — 82800 BLOOD PH: CPT

## 2022-08-10 PROCEDURE — 20000000 HC ICU ROOM

## 2022-08-10 PROCEDURE — 99232 PR SUBSEQUENT HOSPITAL CARE,LEVL II: ICD-10-PCS | Mod: ,,, | Performed by: INTERNAL MEDICINE

## 2022-08-10 PROCEDURE — 83615 LACTATE (LD) (LDH) ENZYME: CPT | Performed by: STUDENT IN AN ORGANIZED HEALTH CARE EDUCATION/TRAINING PROGRAM

## 2022-08-10 PROCEDURE — 97535 SELF CARE MNGMENT TRAINING: CPT

## 2022-08-10 PROCEDURE — 63600175 PHARM REV CODE 636 W HCPCS

## 2022-08-10 PROCEDURE — 85025 COMPLETE CBC W/AUTO DIFF WBC: CPT | Mod: 91 | Performed by: THORACIC SURGERY (CARDIOTHORACIC VASCULAR SURGERY)

## 2022-08-10 PROCEDURE — 84100 ASSAY OF PHOSPHORUS: CPT | Mod: 91 | Performed by: THORACIC SURGERY (CARDIOTHORACIC VASCULAR SURGERY)

## 2022-08-10 PROCEDURE — 94761 N-INVAS EAR/PLS OXIMETRY MLT: CPT

## 2022-08-10 PROCEDURE — 25000003 PHARM REV CODE 250: Performed by: THORACIC SURGERY (CARDIOTHORACIC VASCULAR SURGERY)

## 2022-08-10 PROCEDURE — 94644 CONT INHLJ TX 1ST HOUR: CPT

## 2022-08-10 PROCEDURE — 85014 HEMATOCRIT: CPT

## 2022-08-10 PROCEDURE — 86140 C-REACTIVE PROTEIN: CPT | Performed by: STUDENT IN AN ORGANIZED HEALTH CARE EDUCATION/TRAINING PROGRAM

## 2022-08-10 PROCEDURE — 83880 ASSAY OF NATRIURETIC PEPTIDE: CPT | Performed by: STUDENT IN AN ORGANIZED HEALTH CARE EDUCATION/TRAINING PROGRAM

## 2022-08-10 RX ORDER — HEPARIN SODIUM,PORCINE/D5W 25000/250
0-40 INTRAVENOUS SOLUTION INTRAVENOUS CONTINUOUS
Status: DISCONTINUED | OUTPATIENT
Start: 2022-08-10 | End: 2022-08-30

## 2022-08-10 RX ORDER — PREDNISONE 20 MG/1
40 TABLET ORAL DAILY
Status: DISCONTINUED | OUTPATIENT
Start: 2022-08-11 | End: 2022-08-16

## 2022-08-10 RX ORDER — METHIMAZOLE 10 MG/1
20 TABLET ORAL 2 TIMES DAILY
Status: DISCONTINUED | OUTPATIENT
Start: 2022-08-10 | End: 2022-08-11

## 2022-08-10 RX ORDER — ALBUMIN HUMAN 50 G/1000ML
SOLUTION INTRAVENOUS
Status: DISCONTINUED
Start: 2022-08-10 | End: 2022-08-10 | Stop reason: WASHOUT

## 2022-08-10 RX ORDER — PANTOPRAZOLE SODIUM 40 MG/1
40 FOR SUSPENSION ORAL DAILY
Status: DISCONTINUED | OUTPATIENT
Start: 2022-08-11 | End: 2022-08-18

## 2022-08-10 RX ORDER — INSULIN ASPART 100 [IU]/ML
4 INJECTION, SOLUTION INTRAVENOUS; SUBCUTANEOUS
Status: DISCONTINUED | OUTPATIENT
Start: 2022-08-11 | End: 2022-08-15

## 2022-08-10 RX ADMIN — LEVALBUTEROL HYDROCHLORIDE 0.63 MG: 0.63 SOLUTION RESPIRATORY (INHALATION) at 12:08

## 2022-08-10 RX ADMIN — ACETYLCYSTEINE 4 ML: 100 SOLUTION ORAL; RESPIRATORY (INHALATION) at 08:08

## 2022-08-10 RX ADMIN — METHIMAZOLE 20 MG: 10 TABLET ORAL at 09:08

## 2022-08-10 RX ADMIN — PANTOPRAZOLE SODIUM 40 MG: 40 INJECTION, POWDER, FOR SOLUTION INTRAVENOUS at 09:08

## 2022-08-10 RX ADMIN — ACETYLCYSTEINE 4 ML: 100 SOLUTION ORAL; RESPIRATORY (INHALATION) at 12:08

## 2022-08-10 RX ADMIN — LEVALBUTEROL HYDROCHLORIDE 0.63 MG: 0.63 SOLUTION RESPIRATORY (INHALATION) at 08:08

## 2022-08-10 RX ADMIN — PREDNISONE 60 MG: 20 TABLET ORAL at 09:08

## 2022-08-10 RX ADMIN — GUAIFENESIN 300 MG: 100 SOLUTION ORAL at 12:08

## 2022-08-10 RX ADMIN — MEROPENEM 2 G: 1 INJECTION INTRAVENOUS at 05:08

## 2022-08-10 RX ADMIN — CHOLESTYRAMINE 4 G: 4 POWDER, FOR SUSPENSION ORAL at 09:08

## 2022-08-10 RX ADMIN — MEROPENEM 2 G: 1 INJECTION INTRAVENOUS at 09:08

## 2022-08-10 RX ADMIN — ASPIRIN 81 MG CHEWABLE TABLET 81 MG: 81 TABLET CHEWABLE at 09:08

## 2022-08-10 RX ADMIN — INSULIN ASPART 5 UNITS: 100 INJECTION, SOLUTION INTRAVENOUS; SUBCUTANEOUS at 08:08

## 2022-08-10 RX ADMIN — AMIODARONE HYDROCHLORIDE 400 MG: 200 TABLET ORAL at 09:08

## 2022-08-10 RX ADMIN — MEXILETINE HYDROCHLORIDE 400 MG: 200 CAPSULE ORAL at 05:08

## 2022-08-10 RX ADMIN — INSULIN DETEMIR 6 UNITS: 100 INJECTION, SOLUTION SUBCUTANEOUS at 08:08

## 2022-08-10 RX ADMIN — INSULIN ASPART 5 UNITS: 100 INJECTION, SOLUTION INTRAVENOUS; SUBCUTANEOUS at 04:08

## 2022-08-10 RX ADMIN — CHOLESTYRAMINE 4 G: 4 POWDER, FOR SUSPENSION ORAL at 01:08

## 2022-08-10 RX ADMIN — LEVALBUTEROL HYDROCHLORIDE 0.63 MG: 0.63 SOLUTION RESPIRATORY (INHALATION) at 07:08

## 2022-08-10 RX ADMIN — ACETYLCYSTEINE 4 ML: 100 SOLUTION ORAL; RESPIRATORY (INHALATION) at 07:08

## 2022-08-10 RX ADMIN — CHOLESTYRAMINE 4 G: 4 POWDER, FOR SUSPENSION ORAL at 05:08

## 2022-08-10 RX ADMIN — INSULIN ASPART 5 UNITS: 100 INJECTION, SOLUTION INTRAVENOUS; SUBCUTANEOUS at 12:08

## 2022-08-10 RX ADMIN — MEXILETINE HYDROCHLORIDE 400 MG: 200 CAPSULE ORAL at 01:08

## 2022-08-10 RX ADMIN — MEROPENEM 2 G: 1 INJECTION INTRAVENOUS at 02:08

## 2022-08-10 NOTE — NURSING
Norberto Garza MD at bedside oozing blood still present. Orders to leave trach dsg in place to allow it to clot and stop heparin gtt for 2 hrs.

## 2022-08-10 NOTE — CARE UPDATE
-Called by Dominic as tracheostomy site with significant blood oozing out. Pt in no distress. Will leave dsg and allow to clot will need another dressing change after. Heparin gtt held for ~ 2 hrs.       CXR ordered.                           Susannah Thornton MD  Cardiology fellow IV

## 2022-08-10 NOTE — PROGRESS NOTES
08/10/2022  Fitz Christianson    Current provider:  Isaiah Santizo MD    Device interrogation:  TXP LVAD INTERROGATIONS 8/10/2022 8/10/2022 8/10/2022 8/10/2022 8/10/2022 8/10/2022 8/9/2022   Type HeartMate3 HeartMate3 HeartMate3 HeartMate3 HeartMate3 Heartware HeartMate3   Flow 5.8 5.6 5.6 5.7 5.7 5.7 5.7   Speed 6300 6300 6300 6300 6300 6300 6300   PI 1.4 1.6 1.5 1.4 1.4 1.4 1.3   Power (Jackson) 5.3 5.4 5.5 5.4 5.4 5.5 5.4   LSL 5900 5900 5900 5900 5900 5900 5900   Low Flow Alarm - - - - - - -   Pulsatility Intermittent pulse Intermittent pulse Intermittent pulse Intermittent pulse Intermittent pulse Intermittent pulse Intermittent pulse          Rounded on Tim Richards to ensure all mechanical assist device settings (IABP or VAD) were appropriate and all parameters were within limits.  I was able to ensure all back up equipment was present, the staff had no issues, and the Perfusion Department daily rounding was complete.      For implantable VADs: Interrogation of Ventricular assist device was performed with analysis of device parameters and review of device function. I have personally reviewed the interrogation findings and agree with findings as stated.     In emergency, the nursing units have been notified to contact the perfusion department either by:  Calling [phone] from 630am to 4pm Mon thru Fri, utilizing the On-Call Finder functionality of Epic and searching for Perfusion, or by contacting the hospital  from 4pm to 630am and on weekends and asking to speak with the perfusionist on call.    9:25 AM

## 2022-08-10 NOTE — NURSING
Norberto Garza MD updated trach site still bleeding. Orders for chest x ray in. MD will come to bedside.

## 2022-08-10 NOTE — SUBJECTIVE & OBJECTIVE
Interval History: no events, WBC downtrending, cultures pending, on trach mask today    Review of Systems   Unable to perform ROS: Acuity of condition   Objective:     Vital Signs (Most Recent):  Temp: 98.2 °F (36.8 °C) (08/10/22 1100)  Pulse: 71 (08/10/22 1234)  Resp: (!) 23 (08/10/22 1234)  BP: (!) 76/0 (08/10/22 0700)  SpO2: 96 % (08/10/22 1234)   Vital Signs (24h Range):  Temp:  [97.6 °F (36.4 °C)-98.5 °F (36.9 °C)] 98.2 °F (36.8 °C)  Pulse:  [68-92] 71  Resp:  [16-32] 23  SpO2:  [95 %-99 %] 96 %  BP: (76-90)/(0) 76/0  Arterial Line BP: ()/(58-89) 84/71     Weight: 78 kg (172 lb)  Body mass index is 22.69 kg/m².    Estimated Creatinine Clearance: 48.5 mL/min (A) (based on SCr of 1.9 mg/dL (H)).    Physical Exam  Constitutional:       General: He is not in acute distress.     Appearance: Normal appearance. He is well-developed. He is not ill-appearing or diaphoretic.   HENT:      Head: Normocephalic and atraumatic.      Right Ear: External ear normal.      Left Ear: External ear normal.      Nose: Nose normal.   Eyes:      General: No scleral icterus.        Right eye: No discharge.         Left eye: No discharge.      Extraocular Movements: Extraocular movements intact.      Conjunctiva/sclera: Conjunctivae normal.   Cardiovascular:      Rate and Rhythm: Normal rate and regular rhythm.      Pulses: Normal pulses.      Heart sounds: Normal heart sounds. No murmur heard.    No friction rub. No gallop.   Pulmonary:      Effort: Pulmonary effort is normal. No respiratory distress.      Breath sounds: Normal breath sounds. No stridor.      Comments: Dried blood surrounding trach site  Abdominal:      General: Abdomen is flat. Bowel sounds are normal. There is no distension.      Palpations: Abdomen is soft.      Tenderness: There is no abdominal tenderness.      Comments: Old right sided DLES dry on inspection  Left sided DLES bandage clean, dry, and intact    Skin:     General: Skin is warm and dry.       Coloration: Skin is not jaundiced or pale.      Findings: No erythema.      Comments: Midline chest incision healing   R groin wound vac   Neurological:      General: No focal deficit present.      Mental Status: He is alert and oriented to person, place, and time. Mental status is at baseline.   Psychiatric:         Mood and Affect: Mood normal.         Behavior: Behavior normal.         Thought Content: Thought content normal.         Judgment: Judgment normal.       Significant Labs: CBC:   Recent Labs   Lab 08/09/22  1351 08/09/22  2010 08/10/22  0333 08/10/22  0403   WBC 35.37* 32.02* 29.88*  --    HGB 8.9* 8.9* 8.9*  --    HCT 32.0* 30.2* 29.5* 29*    285 283  --      CMP:   Recent Labs   Lab 08/09/22  1351 08/09/22  2010 08/10/22  0333    137 139   K 5.4* 5.5* 4.8    106 108   CO2 24 23 20*   * 145* 98   BUN 72* 70* 71*   CREATININE 2.0* 1.9* 1.9*   CALCIUM 9.8 9.6 9.7   PROT 6.6 6.4 6.3   ALBUMIN 2.4* 2.4* 2.4*   BILITOT 4.9* 4.5* 4.3*   ALKPHOS 144* 144* 147*   AST 46* 38 36   ALT 57* 56* 54*   ANIONGAP 8 8 11     Microbiology Results (last 7 days)       Procedure Component Value Units Date/Time    Blood culture [[id number]] Collected: 08/09/22 1102    Order Status: Completed Specimen: Blood from Peripheral, Antecubital, Left Updated: 08/10/22 1212     Blood Culture, Routine No Growth to date      No Growth to date    Blood culture [[id number]] Collected: 08/09/22 1045    Order Status: Completed Specimen: Blood from Peripheral, Hand, Right Updated: 08/10/22 1212     Blood Culture, Routine No Growth to date      No Growth to date    Culture, Anaerobe [[id number]] Collected: 08/09/22 1347    Order Status: Completed Specimen: Incision site from Abdomen Updated: 08/10/22 0729     Anaerobic Culture Culture in progress    Narrative:      DL site    Culture, Respiratory with Gram Stain [[id number]] Collected: 08/09/22 1026    Order Status: Completed Specimen: Respiratory from Endotracheal  Aspirate Updated: 08/10/22 0727     Respiratory Culture Normal respiratory sachin     Gram Stain (Respiratory) <10 epithelial cells per low power field.     Gram Stain (Respiratory) Rare WBC's     Gram Stain (Respiratory) No organisms seen    Aerobic culture [[id number]] Collected: 08/09/22 1347    Order Status: Completed Specimen: Incision site from Abdomen Updated: 08/10/22 0535     Aerobic Bacterial Culture No growth    Narrative:      DL site.    IV catheter culture [[id number]] Collected: 08/04/22 1525    Order Status: Completed Specimen: Catheter Tip, Intrajugular Updated: 08/06/22 0950     Aerobic Culture - Cath tip No growth            Significant Imaging: I have reviewed all pertinent imaging results/findings within the past 24 hours.

## 2022-08-10 NOTE — ASSESSMENT & PLAN NOTE
Leukocytosis with associated fever up to 100.9 F status post creation of tracheostomy on 8/4/22. Blood cultures this admission have been unrevealing. Only notable pathogen isolated was Klebsiella aerogenes from sputum for which patient has been treated with cefepime. Metronidazole was added for anaerobic coverage. 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 Old DLES inspected at bedside today and found to be dry with no drainage. Newer left sided DLES bandage seen dry, clean, and intact with no drainage reported by nursing. DLES cultures performed on 7/25 showed no growth. CT c/a/p 7/29 with no evidence of infection. 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 First dose 80 mg now tapered down to 60 mg. No evidence of new infectious process. Patient denies any alarm symptoms today. Remains afebrile off all pressors. Leukocytosis is likely multifactorial in setting of recent surgeries, high dose steroids, thyrotoxicosis. Cefepime / flagyl changed to miguel no 8/9, WBC now downtrending. No infectious etiology identified.     Recommendations  - continue meropenem - WBC now downtrending  - follow up all pending cultures  - ? Who is managing wound vac - please consult wound care for vac change  - If patient will be on glucocorticoid dose equivalent to ?20 mg of prednisone daily for one month or longer then would recommend starting PJP prophylaxis with PO TMP//80 mg daily or renally dosed by pharmacy     Will follow

## 2022-08-10 NOTE — PROGRESS NOTES
Update    Pt presents as calm, cooperative, and able to nod head. Pt recently got Trach and adjusting to the device. PT's wife Kelly is a bedside and giving Pt a haircut. Pt and wife state they have no needs at this time. LCSW offered support and encouragement. SW providing ongoing psychosocial, counseling, & emotional support, education, resources, assistance, and discharge planning as indicated.  SW to continue to follow.

## 2022-08-10 NOTE — PT/OT/SLP PROGRESS
Occupational Therapy   Co-Treatment with PT    Name: Tim Richards  MRN: [id number]  Admitting Diagnosis:  Left ventricular assist device (LVAD) complication  6 Days Post-Op    Recommendations:     Discharge Recommendations: rehabilitation facility  Discharge Equipment Recommendations:   (TBD)  Barriers to discharge:  None    Assessment:     Tim Richards is a 56 y.o. male with a medical diagnosis of Left ventricular assist device (LVAD) complication.  He presents with good motivation and participation. Pt tolerated session well and is slowly progressing towards OT goals. Pt with c/o of dizziness throughout session. Performance deficits affecting function are weakness, impaired endurance, impaired self care skills, impaired functional mobility, gait instability, impaired balance, decreased coordination, decreased upper extremity function, decreased lower extremity function, decreased safety awareness, pain. Pt would benefit from skilled OT services in order to maximize independence with ADLs and facilitate safe discharge. Because of patient's significant decline from PLOF, patient would benefit from Inpatient Rehab to maximize return to PLOF.     Rehab Prognosis:  Good; patient would benefit from acute skilled OT services to address these deficits and reach maximum level of function.       Plan:     Patient to be seen 5 x/week to address the above listed problems via self-care/home management, therapeutic activities, therapeutic exercises, neuromuscular re-education  · Plan of Care Expires: 08/25/22  · Plan of Care Reviewed with: patient    Subjective     Pain/Comfort:  · Pain Rating 1: 0/10 (at rest)  · Pain Addressed 1: Distraction, Cessation of Activity  · Pain Rating Post-Intervention 1:  (unrated back pain while EOB)    Objective:     Communicated with: RN and PT prior to session.  Patient found HOB elevated with telemetry, pulse ox (continuous), Tracheostomy, oxygen, NG tube, LVAD, arterial line, blood  pressure cuff, pressure relief boots upon OT entry to room.    General Precautions: Standard, fall, LVAD, sternal   Orthopedic Precautions:N/A   Braces: N/A  Respiratory Status: trach collar     Occupational Performance:     Bed Mobility:    · Patient completed Scooting anterior towards EOB with stand by assistance  · Patient completed Supine to Sit with moderate assistance  · Patient completed Sit to Supine with moderate assistance     Functional Mobility/Transfers:  · Patient completed Sit <> Stand Transfer with maximal assistance and of 2 persons  with  hand-held assist    · x3 trials EOB  · ~30 second static stand x2 trials with max (A) x2 persons and facilitation for erect posture with B knee/foot blocking  · Functional Mobility: unable  · Pt sat EOB x10 minutes with SBA    Activities of Daily Living:  · Grooming: stand by assistance to perform facial hygiene seated EOB  · Lower Body Dressing: maximal assistance to don/doff B  socks   · Toileting: total (A) posterior pericare in standing      AMPAC 6 Click ADL: 12    Treatment & Education:  -Therapist provided facilitation and instruction of proper body mechanics, energy conservation, and fall prevention strategies during tasks listed above.  -Pt educated on role of OT, POC and goals for therapy  -Pt educated on importance of OOB activities with staff member assistance   -Pt verbalized understanding. Pt expressed no further concerns/questions  -Whiteboard updated  -Co-tx with PT performed due to need for education and assistance from two skilled therapy disciplines at pt's current functional level in the ICU setting    Patient left HOB elevated with all lines intact and call button in reachEducation:      GOALS:   Multidisciplinary Problems     Occupational Therapy Goals        Problem: Occupational Therapy    Goal Priority Disciplines Outcome Interventions   Occupational Therapy Goal     OT, PT/OT Ongoing, Progressing    Description: Goals to be met by:  8/9/22     Patient will increase functional independence with ADLs by performing:    UE Dressing with Stand-by Assistance.  LE Dressing with Stand-by Assistance.  Grooming while standing at sink with Stand-by Assistance.  Toileting from toilet with Stand-by Assistance for hygiene and clothing management.   Toilet transfer to toilet with Stand-by Assistance.               Multidisciplinary Problems (Resolved)        Problem: Occupational Therapy    Goal Priority Disciplines Outcome Interventions   Occupational Therapy Goal   (Resolved)     OT, PT/OT Met           Problem: Occupational Therapy    Goal Priority Disciplines Outcome Interventions   Occupational Therapy Goal   (Resolved)     OT, PT/OT Met                    Time Tracking:     OT Date of Treatment: 08/10/22  OT Start Time: 1134  OT Stop Time: 1157  OT Total Time (min): 23 min    Billable Minutes:Self Care/Home Management 13  Therapeutic Activity 10    OT/CARY: OT          8/10/2022

## 2022-08-10 NOTE — PROGRESS NOTES
[facility]  Infectious Disease  Progress Note    Patient Name: Tim Richards  MRN: [id number]  Admission Date: 6/27/2022  Length of Stay: 44 days  Attending Physician: Isaiah Santizo MD  Primary Care Provider: Deyanira Booth MD    Isolation Status: No active isolations  Assessment/Plan:      Leukocytosis  Leukocytosis with associated fever up to 100.9 F status post creation of tracheostomy on 8/4/22. Blood cultures this admission have been unrevealing. Only notable pathogen isolated was Klebsiella aerogenes from sputum for which patient has been treated with cefepime. Metronidazole was added for anaerobic coverage. 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 Old DLES inspected at bedside today and found to be dry with no drainage. Newer left sided DLES bandage seen dry, clean, and intact with no drainage reported by nursing. DLES cultures performed on 7/25 showed no growth. CT c/a/p 7/29 with no evidence of infection. 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 First dose 80 mg now tapered down to 60 mg. No evidence of new infectious process. Patient denies any alarm symptoms today. Remains afebrile off all pressors. Leukocytosis is likely multifactorial in setting of recent surgeries, high dose steroids, thyrotoxicosis. Cefepime / flagyl changed to miguel no 8/9, WBC now downtrending. No infectious etiology identified.     Recommendations  - continue meropenem - WBC now downtrending  - follow up all pending cultures  - ? Who is managing wound vac - please consult wound care for vac change  - If patient will be on glucocorticoid dose equivalent to ?20 mg of prednisone daily for one month or longer then would recommend starting PJP prophylaxis with PO TMP//80 mg daily or renally dosed by pharmacy     Will follow       Anticipated Disposition: [facility]BMICHELLE    Thank you for your consult. I will follow-up  "with patient. Please contact us if you have any additional questions.    Nelly Mullins DO  Critical Care Infectious Disease    Time: 35 minutes   50% of time spent on face-to-face counseling and coordination of care. Counseling included review of test results, diagnosis, and treatment plan with patient and/or family.        Subjective:     Principal Problem:Left ventricular assist device (LVAD) complication    HPI: A 56-year-old man with HFrEF-10%, s/p VAD HM2 (March 2018), ICD, VT on amiodarone who developed malaise, anorexia, SOB, dark urine, and soft stools 3-4 days prior to admission. He was evaluated in [facility] ED at which time he tested positive for Covid-19 infection. He was admitted for further management and started on Remdesivir treatment protocol. Since admission, he feels significantly improved with bowel movements returning to normal and the shortness of breath subsiding.     Infectious Diseases consulted for "covid infection, acute. Patient with LVAD"    ID reconsulted 7/21 for "sepsis and consideration for fungemia". Since pt was last seen by ID, patient underwent AV repair, redo sternotomy, explant of old lvad HM2 with implantation of HM3, and placed on VA-ECMO, takeback 7/14 for mediastinal washout/hematoma, and washout, sternal closure, creation of moises-pericardium (gerotex membrane) and wound vac placement on 7/15. Decannulated ECMO on 7/19 with subsequent fever and hypotension. Pt was placed on broad spectrum abx. Blcx obtained from 7/20 ngtd. S/p bronch on 7/20 - cx with normal respiratory sachin.     ID reconsulted 8/5 for "On cefepime and flagyl day 10 for klebsiella. Spiking fever. Has LVAD. WBC trending up (also on steroid though). Tip culture from [facility] in process."      Interval History: no events, WBC downtrending, cultures pending, on trach mask today    Review of Systems   Unable to perform ROS: Acuity of condition   Objective:     Vital Signs (Most Recent):  Temp: 98.2 °F (36.8 °C) (08/10/22 " 1100)  Pulse: 71 (08/10/22 1234)  Resp: (!) 23 (08/10/22 1234)  BP: (!) 76/0 (08/10/22 0700)  SpO2: 96 % (08/10/22 1234)   Vital Signs (24h Range):  Temp:  [97.6 °F (36.4 °C)-98.5 °F (36.9 °C)] 98.2 °F (36.8 °C)  Pulse:  [68-92] 71  Resp:  [16-32] 23  SpO2:  [95 %-99 %] 96 %  BP: (76-90)/(0) 76/0  Arterial Line BP: ()/(58-89) 84/71     Weight: 78 kg (172 lb)  Body mass index is 22.69 kg/m².    Estimated Creatinine Clearance: 48.5 mL/min (A) (based on SCr of 1.9 mg/dL (H)).    Physical Exam  Constitutional:       General: He is not in acute distress.     Appearance: Normal appearance. He is well-developed. He is not ill-appearing or diaphoretic.   HENT:      Head: Normocephalic and atraumatic.      Right Ear: External ear normal.      Left Ear: External ear normal.      Nose: Nose normal.   Eyes:      General: No scleral icterus.        Right eye: No discharge.         Left eye: No discharge.      Extraocular Movements: Extraocular movements intact.      Conjunctiva/sclera: Conjunctivae normal.   Cardiovascular:      Rate and Rhythm: Normal rate and regular rhythm.      Pulses: Normal pulses.      Heart sounds: Normal heart sounds. No murmur heard.    No friction rub. No gallop.   Pulmonary:      Effort: Pulmonary effort is normal. No respiratory distress.      Breath sounds: Normal breath sounds. No stridor.      Comments: Dried blood surrounding trach site  Abdominal:      General: Abdomen is flat. Bowel sounds are normal. There is no distension.      Palpations: Abdomen is soft.      Tenderness: There is no abdominal tenderness.      Comments: Old right sided DLES dry on inspection  Left sided DLES bandage clean, dry, and intact    Skin:     General: Skin is warm and dry.      Coloration: Skin is not jaundiced or pale.      Findings: No erythema.      Comments: Midline chest incision healing   R groin wound vac   Neurological:      General: No focal deficit present.      Mental Status: He is alert and  oriented to person, place, and time. Mental status is at baseline.   Psychiatric:         Mood and Affect: Mood normal.         Behavior: Behavior normal.         Thought Content: Thought content normal.         Judgment: Judgment normal.       Significant Labs: CBC:   Recent Labs   Lab 08/09/22  1351 08/09/22  2010 08/10/22  0333 08/10/22  0403   WBC 35.37* 32.02* 29.88*  --    HGB 8.9* 8.9* 8.9*  --    HCT 32.0* 30.2* 29.5* 29*    285 283  --      CMP:   Recent Labs   Lab 08/09/22  1351 08/09/22  2010 08/10/22  0333    137 139   K 5.4* 5.5* 4.8    106 108   CO2 24 23 20*   * 145* 98   BUN 72* 70* 71*   CREATININE 2.0* 1.9* 1.9*   CALCIUM 9.8 9.6 9.7   PROT 6.6 6.4 6.3   ALBUMIN 2.4* 2.4* 2.4*   BILITOT 4.9* 4.5* 4.3*   ALKPHOS 144* 144* 147*   AST 46* 38 36   ALT 57* 56* 54*   ANIONGAP 8 8 11     Microbiology Results (last 7 days)       Procedure Component Value Units Date/Time    Blood culture [[id number]] Collected: 08/09/22 1102    Order Status: Completed Specimen: Blood from Peripheral, Antecubital, Left Updated: 08/10/22 1212     Blood Culture, Routine No Growth to date      No Growth to date    Blood culture [[id number]] Collected: 08/09/22 1045    Order Status: Completed Specimen: Blood from Peripheral, Hand, Right Updated: 08/10/22 1212     Blood Culture, Routine No Growth to date      No Growth to date    Culture, Anaerobe [[id number]] Collected: 08/09/22 1347    Order Status: Completed Specimen: Incision site from Abdomen Updated: 08/10/22 0729     Anaerobic Culture Culture in progress    Narrative:      DL site    Culture, Respiratory with Gram Stain [[id number]] Collected: 08/09/22 1026    Order Status: Completed Specimen: Respiratory from Endotracheal Aspirate Updated: 08/10/22 0779     Respiratory Culture Normal respiratory sachin     Gram Stain (Respiratory) <10 epithelial cells per low power field.     Gram Stain (Respiratory) Rare WBC's     Gram Stain (Respiratory) No  organisms seen    Aerobic culture [[id number]] Collected: 08/09/22 1347    Order Status: Completed Specimen: Incision site from Abdomen Updated: 08/10/22 0535     Aerobic Bacterial Culture No growth    Narrative:      DL site.    IV catheter culture [[id number]] Collected: 08/04/22 1525    Order Status: Completed Specimen: Catheter Tip, Intrajugular Updated: 08/06/22 0950     Aerobic Culture - Cath tip No growth            Significant Imaging: I have reviewed all pertinent imaging results/findings within the past 24 hours.

## 2022-08-10 NOTE — PT/OT/SLP PROGRESS
Physical Therapy Co-Treatment    Patient Name:  Tim Richards   MRN:  [id number]    Recommendations:     Discharge Recommendations:  rehabilitation facility   Discharge Equipment Recommendations:  (TBD)   Barriers to discharge: Increased level of assist, Inaccessible home and Decreased caregiver support    Assessment:     Tim Richards is a 56 y.o. male admitted with a medical diagnosis of Left ventricular assist device (LVAD) complication. Patient tolerated session well, able to participate in 3 sit to stand trials. Reported feeling dizzy throughout mobility, BP per arterial line remained stable with MAP >60.    He presents with the following impairments/functional limitations: weakness, impaired endurance, impaired self care skills, impaired functional mobility, gait instability, impaired balance, decreased coordination, decreased upper extremity function, decreased lower extremity function, decreased safety awareness. Once medically stable, recommending pt discharge to rehabilitation facility.    Rehab Prognosis: Good; patient continues to benefit from acute skilled PT services to address these deficits and reach maximum level of function.  Recent Surgery: Procedure(s) (LRB):  CREATION, TRACHEOSTOMY (N/A)  INSERTION, CENTRAL VENOUS ACCESS DEVICE 6 Days Post-Op    Plan:     During this hospitalization, patient to be seen 5 x/week to address the identified rehab impairments via gait training, therapeutic activities, therapeutic exercises, neuromuscular re-education and progress toward the following goals:    · Plan of Care Expires:  08/20/22    Subjective     Chief Complaint: Back pain sitting edge of bed  Patient/Family Comments/Goals: agreeable to treatment  Pain/Comfort:  · Pain Rating 1:  (unable to rate)  · Location - Orientation 1: generalized  · Location 1: back  · Pain Addressed 1: Distraction, Cessation of Activity  · Pain Rating Post-Intervention 1:  (not rated)    Objective:     Communicated with  RN prior to session. Patient found HOB elevated with telemetry, pulse ox (continuous), blood pressure cuff, Tracheostomy, peripheral IV, oxygen, NG tube, LVAD upon PT entry to room.     General Precautions: Standard, fall, LVAD, sternal   Orthopedic Precautions:N/A   Braces: N/A    Functional Mobility:  · Bed Mobility:     · Rolling Right: moderate assistance  · Scooting: stand by assistance  · Supine to Sit: moderate assistance  · Sit to Supine: moderate assistance  · Transfers:     · Sit to Stand: maximal assistance of 2 persons with hand-held assist, 3 trials  · Gait: Deferred due to poor standing balance   · Balance:   · Static Sitting: Good, able to maintain for 10 minute(s) with stand by assistance, cuing for upright posture with B scapular retraction  · Dynamic Sitting: Fair: Patient accepts minimal challenge, stand by assistance  · Static Standing: Poor, able to maintain for 30 seconds with maximal assistance of 2 persons, significant cuing and assistance for upright posture with gluteal activation, 2 trials  · Dynamic Standing: not assessed this visit    AM-PAC 6 CLICK MOBILITY  Turning over in bed (including adjusting bedclothes, sheets and blankets)?: 2  Sitting down on and standing up from a chair with arms (e.g., wheelchair, bedside commode, etc.): 2  Moving from lying on back to sitting on the side of the bed?: 2  Moving to and from a bed to a chair (including a wheelchair)?: 1  Need to walk in hospital room?: 1  Climbing 3-5 steps with a railing?: 1  Basic Mobility Total Score: 9     Therapeutic Activities and Exercises:  Patient educated on role of acute care PT and PT POC  Patient participated in self-care activities with OT sitting edge of bed, PT assisting with balance during. Please see OT note for further details     Patient left HOB elevated with all lines intact, call button in reach and RN notified.    GOALS:   Multidisciplinary Problems     Physical Therapy Goals        Problem: Physical  Therapy    Goal Priority Disciplines Outcome Goal Variances Interventions   Physical Therapy Goal     PT, PT/OT Ongoing, Progressing     Description: Goals to be met by: 22    Patient will increase functional independence with mobility by performin. Supine to sit with MInimal Assistance -not met  2. Sit to stand transfer with Contact Guard Assistance -not met  3. Gait  x 150 feet with Contact Guard Assistance with approved assistive device -not met  4. Ascend/descend 8 stair with right Handrails Contact Guard Assistance -not met  5. Sitting at edge of bed x15 minutes with Contact Guard Assistance to improve tolerance to activities. -not met  6. Lower extremity exercise program x15 reps with assistance as needed -not met               Multidisciplinary Problems (Resolved)        Problem: Physical Therapy    Goal Priority Disciplines Outcome Goal Variances Interventions   Physical Therapy Goal   (Resolved)     PT, PT/OT Met     Description: The patient is near his functional baseline, he ambulated within the room with no AD and independence. Unable to do stair training, assess gait endurance due to COVID restrictions. He is safe to return home with no therapy needs. He is in agreement with PT discharge, he states he is confident he can ascend/descend his stairs.           Problem: Physical Therapy    Goal Priority Disciplines Outcome Goal Variances Interventions   Physical Therapy Goal   (Resolved)     PT, PT/OT Met                     Time Tracking:     PT Received On: 08/10/22  PT Start Time: 1134     PT Stop Time: 1157  PT Total Time (min): 23 min     Billable Minutes: Therapeutic Activity 15 min and Neuromuscular Re-education 8 min        PT/PTA: PT     PTA Visit Number: 0     08/10/2022    Co-treatment performed for this visit due to patient need for two skilled therapists to ensure patient and staff safety and to accommodate for patient activity tolerance/pain management

## 2022-08-10 NOTE — ASSESSMENT & PLAN NOTE
Key History and Diagnostic Findings  - History of AIT type 2 (TRAb and TSI negative; Thyroid US unremarkable with low vascularity)  - Weaned off methimazole and prednisone by May 2020, then developed hypothyroidism, Levothyroxine started and dose titrated per TFTs. Prior to current admission he was on Levothyroxine 112 mcg daily  - Labs consistent with hypothyroidism until current admission, initially on 7/13, with low TSH and elevated fT4. Repeat TFTs on 7/27 with worsening hyperthyroidism. He continued to receive LT4 112 mcg through 7/28. He remains on amiodarone for episodes of Ventricular Tachycardia  - Suspect current hyperthyroidism is AIT, however continued exogenous LT4 certainly contribute to current presentation   - Free T4 continues to improve, today at 2.12    Lab Results   Component Value Date    TSH <0.010 (L) 07/27/2022    TSH 0.111 (L) 07/13/2022    TSH 4.079 (H) 03/17/2022    FREET4 2.12 (H) 08/10/2022    FREET4 2.23 (H) 08/09/2022    FREET4 2.43 (H) 08/08/2022     Plan  - Strongly suspect AIT (type 2); continuing empiric treatment for both  - Heparin can cause a false elevation in free T4 as it displaces free T4 from TBG; will follow-up direct dialysis result which is still pending  - Continue Methimazole 20 mg TID   - Can decrease Prednisone to 40 mg daily  - Continue checking daily free T4

## 2022-08-10 NOTE — PROGRESS NOTES
[facility]  Heart Failure  Progress Note     Hospital Length of Stay: 44  Interval History:  - Continue to ooze from the trach site. Heparin stopped.  Tolerating tube feeding at goal of 60 ml/hr.     Nausea has resolved. Denies any abd discomfort.   - No fever in last  hour. WBC is still very high.   - No significant event in LVAD recorded.       Hemodynamics:   SvO2 of 66, CVP 4      Vitals:  Temp:  [97.6 °F (36.4 °C)-98.5 °F (36.9 °C)] 97.6 °F (36.4 °C)  Pulse:  [68-92] 75  Resp:  [16-32] 27  SpO2:  [95 %-99 %] 98 %  BP: (76-90)/(0) 76/0  Arterial Line BP: ()/(58-89) 87/76    Intake/Output    Intake/Output Summary (Last 24 hours) at 8/10/2022 1118  Last data filed at 8/10/2022 1000  Gross per 24 hour   Intake 1712.04 ml   Output 1425 ml   Net 287.04 ml     Physical Exam  Gen: Trach in place with blood soaked gauze, alert and awake.   HEENT: Pupils equal and reactive to light. Icterus +  Cardio: Regular rate, VAD hum obstructing heart sounds  Resp: No additional sound heard.   Abd: Soft, non-tender, non-distended  Skin: Warm,  trace peripheral edema  Neuro: No gross abnormalities.   Psych: Normal mood and affect.      Labs:  Recent Labs   Lab 08/09/22  1351 08/09/22  2010 08/10/22  0333 08/10/22  0403   WBC 35.37* 32.02* 29.88*  --    HGB 8.9* 8.9* 8.9*  --    HCT 32.0* 30.2* 29.5* 29*    285 283  --      Recent Labs   Lab 08/09/22  1351 08/09/22  2010 08/10/22  0333    137 139   K 5.4* 5.5* 4.8    106 108   CO2 24 23 20*   BUN 72* 70* 71*   CREATININE 2.0* 1.9* 1.9*   * 145* 98   CALCIUM 9.8 9.6 9.7   MG 2.9* 2.9* 2.9*   PHOS 4.1 4.5 3.0       Micro:    Current Meds:   acetylcysteine 100 mg/ml (10%)  4 mL Nebulization Q6H    amiodarone  400 mg Oral Daily    aspirin  81 mg Per OG tube Daily    cholestyramine  1 packet Per NG tube QID    guaiFENesin 100 mg/5 ml  300 mg Per NG tube Q6H    insulin aspart U-100  5 Units Subcutaneous 6 times per day    insulin  detemir U-100  6 Units Subcutaneous QHS    levalbuterol  0.63 mg Nebulization Q6H    magnesium oxide  400 mg Oral BID    meropenem (MERREM) IVPB  2 g Intravenous Q8H    methIMAzole  20 mg Per NG tube TID    mexiletine  400 mg Oral Q8H    ondansetron  4 mg Intravenous Once    [START ON 8/11/2022] pantoprazole  40 mg Per NG tube Daily    [START ON 8/11/2022] predniSONE  40 mg Per NG tube Daily       Continuous Infusions:   dextrose 10 % in water (D10W)       Assessment and Plan:  Cardiogenic Shock  -Previous HM2, underwent pump exchange to HM3 on 7/13/22 complicated by worsening CS/RV failure requiring VA ECMO now decannulated  -Re-intubated on 7/29/22 due to hypercapnic resp failure  - Weaning off ventilator.       Trach site bleeding:   - Hold heparin for now.   - Surgical team consult.   - Monitor Hb periodically.     LVAD:  - No significant events.   - Functioning well.     Fever: No fever in 5  Days:  Leucocytosis trending down.   ID team on board.   Changed to meropenem .   No growth in cultures yet.      History of ventricular tachycardia/Episode of A flutter 2:1 block:  Patient experienced an episode of VT on 6/30 and experienced an ICD shock thought to be related to hypokalemia (K of 3.1 on 6/30)  Aggressively replete K, keep K>4 and Mg>2  Continue mexiletine PO.  Amio gtt transitioned to PO.     COVID-19 virus infection:  -Previously hypoxic then recovered  -ID was consulted, recommended Remdesivir, course completed     Elevated serum lactate dehydrogenase (LDH):  S/p HM3 exchange for HM2 pump thrombosis  Continue to trend LDH.      Amiodarone induced hypothyrodism initially, now hyperthyroidism:  -Endocrine team on board.  -On prednisone and methimazole.   - Prednisone 40 mg. Decreased today because of concern for side effects.   -Continue to monitor free T4.  - Medications has been changes as per Endocrinology team.        Chronic combined systolic and diastolic heart failure  ADHF  Underwent HM  III upgrade on 7/13/22, currently on VA ECMO   Being treated for sepsis.   Currently trach ed. Trach placed on 8/4. (reintubarted 7/29)  Chest tube removal, bronch, and old driveline removed 7/22      Scot Card MD, FACP  Cardiovascular Disease Fellow PGY4  [facility]

## 2022-08-10 NOTE — SUBJECTIVE & OBJECTIVE
"Interval HPI:   Overnight events: fT4 has improved to 2.12      BP (!) 76/0 (BP Location: Right arm, Patient Position: Lying)   Pulse 81   Temp 97.6 °F (36.4 °C) (Oral)   Resp (!) 23   Ht 6' 1" (1.854 m)   Wt 78 kg (172 lb)   SpO2 98%   BMI 22.69 kg/m²     Labs Reviewed and Include    Recent Labs   Lab 08/10/22  0333   GLU 98   CALCIUM 9.7   ALBUMIN 2.4*   PROT 6.3      K 4.8   CO2 20*      BUN 71*   CREATININE 1.9*   ALKPHOS 147*   ALT 54*   AST 36   BILITOT 4.3*     Lab Results   Component Value Date    WBC 29.88 (H) 08/10/2022    HGB 8.9 (L) 08/10/2022    HCT 29 (L) 08/10/2022    MCV 95 08/10/2022     08/10/2022     Recent Labs   Lab 08/09/22  0329 08/10/22  0333   FREET4 2.23* 2.12*     Lab Results   Component Value Date    HGBA1C 5.4 04/26/2021       Nutritional status:   Body mass index is 22.69 kg/m².  Lab Results   Component Value Date    ALBUMIN 2.4 (L) 08/10/2022    ALBUMIN 2.4 (L) 08/09/2022    ALBUMIN 2.4 (L) 08/09/2022     Lab Results   Component Value Date    PREALBUMIN 28 08/05/2022    PREALBUMIN 13 (L) 07/29/2022    PREALBUMIN 11 (L) 07/17/2022       Estimated Creatinine Clearance: 48.5 mL/min (A) (based on SCr of 1.9 mg/dL (H)).    Accu-Checks  Recent Labs     08/08/22  2354 08/09/22  0331 08/09/22  0800 08/09/22  1056 08/09/22  1703 08/09/22 2008 08/09/22  2055 08/10/22  0038 08/10/22  0333 08/10/22  0811   POCTGLUCOSE 127* 112* 106 131* 169* 127* 136* 117* 107 107       Current Medications and/or Treatments Impacting Glycemic Control  Immunotherapy:    Immunosuppressants       None          Steroids:   Hormones (From admission, onward)                Start     Stop Route Frequency Ordered    08/05/22 0900  predniSONE tablet 60 mg         -- PER NG TUBE Daily 08/05/22 0740    08/02/22 1931  melatonin tablet 6 mg         -- OG Nightly PRN 08/02/22 1832          Pressors:    Autonomic Drugs (From admission, onward)                Start     Stop Route Frequency Ordered    " 07/29/22 0011  EPINEPHrine (ADRENALIN) 1 mg/mL injection        Note to Pharmacy: Created by cabinet override    07/29 1214   07/29/22 0011          Hyperglycemia/Diabetes Medications:   Antihyperglycemics (From admission, onward)                Start     Stop Route Frequency Ordered    08/05/22 1200  insulin aspart U-100 pen 5 Units         -- SubQ 6 times per day 08/05/22 0930    08/03/22 2100  insulin detemir U-100 pen 6 Units         -- SubQ Nightly 08/03/22 1900    07/30/22 1023  insulin aspart U-100 pen 0-5 Units         -- SubQ Every 4 hours PRN 07/30/22 0925

## 2022-08-10 NOTE — PLAN OF CARE
"      SICU PLAN OF CARE NOTE    SHIFT EVENTS:  VSS. Bleeding noted around trach site. Per MD Norberto holding heparin until AM and given further orders. POC reviewed with patient and family; questions and concerns addressed. See flow sheets for full assessment details. Will continue to monitor patient closely.      Dx: Left ventricular assist device (LVAD) complication    Vital Signs: BP (!) 90/0 (BP Location: Right arm, Patient Position: Lying)   Pulse 77   Temp 97.8 °F (36.6 °C) (Oral)   Resp (!) 22   Ht 6' 1" (1.854 m)   Wt 80.9 kg (178 lb 5.6 oz)   SpO2 99%   BMI 23.53 kg/m²     Neuro: AAO x4    Respiratory: Ventilator ACVC 30% and PEEP 5    Cardiac: NSR; HR 70s    Diet: Tube Feeds @ goal 60 cc/hr    Urine Output: Voids Spontaneously 800 cc/shift     Labs: CBC CMP MG Phos Q8; daily    Accuchecks: Q4    SKIN NOTE:  Skin: Bleeding noted to trach site, team aware; stopped heparin for 2 hrs overnight and chest x ray ordered. No new skin breakdown or skin tears noted at this time.    Skin precautions maintained including:  Sacrum and heels with foam dressing in place for pressure protection. Frequent weight shift assistance provided Q2 hr to prevent breakdown. Bed plugged in and mattress inflated; Adhesive use limited. Heels elevated off bed. Pressure points protected and positioning supports utilized.  Skin-to-device areas padded. Skin-to-skin areas padded    "

## 2022-08-10 NOTE — PROGRESS NOTES
"[facility]  Endocrinology  Progress Note    Admit Date: 6/27/2022     56 year-old  male with NICM with LVAD, s/p ICD for VT on amiodarone and mexiletine and AIT followed by hypothyroidism initially admitted 6/27/2022 with COVID respiratory failure complicated with LVAD pump thrombosis that was refractory to medical therapy. Underwent LVAD pump exchange. Post-op course complicated by worsening cardiogenic shock/RV failure requiring VA-ECMO. Improved clinically underwent successful decannulation. Continued episodes of VT with ICD shock 7/21 and ongoing anti-arrhythmic mediation adjustments. TFTs on 7/27 significant for recurrent hyperthyroidism with undetectable TSH and elevated fT4.    - Patient re-intubated 07/29/2022    - Endocrinology consulted for recurrent AIT    Regarding AIT:    - Last seen outpatient by Dr. 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 He required a prolonged course of prednisone and methimazole, then subsequently developed hypothyroidism in May 2020. LT4 was adjusted based on serial TFT measurements. Most recently levothyroxine dose has been 112 mcg     - He self-decreased his amiodarone dose to 200 mg daily about 6 months ago. T4 was high on 7/13, but he was continued on levothyroxine 112 mcg    Lab Results   Component Value Date    TSH <0.010 (L) 07/27/2022    TSH 0.111 (L) 07/13/2022    TSH 4.079 (H) 03/17/2022    FREET4 2.51 (H) 08/07/2022    FREET4 2.95 (H) 08/06/2022    FREET4 2.18 (H) 08/05/2022      Latest Reference Range & Units 08/07/22 03:42 08/08/22 03:10 08/09/22 03:29 08/10/22 03:33   Free T4 0.71 - 1.51 ng/dL 2.51 (H) 2.43 (H) 2.23 (H) 2.12 (H)       Interval HPI:   Overnight events: fT4 has improved to 2.12      BP (!) 76/0 (BP Location: Right arm, Patient Position: Lying)   Pulse 81   Temp 97.6 °F (36.4 °C) (Oral)   Resp (!) 23   Ht 6' 1" (1.854 m)   Wt 78 " kg (172 lb)   SpO2 98%   BMI 22.69 kg/m²     Labs Reviewed and Include    Recent Labs   Lab 08/10/22  0333   GLU 98   CALCIUM 9.7   ALBUMIN 2.4*   PROT 6.3      K 4.8   CO2 20*      BUN 71*   CREATININE 1.9*   ALKPHOS 147*   ALT 54*   AST 36   BILITOT 4.3*     Lab Results   Component Value Date    WBC 29.88 (H) 08/10/2022    HGB 8.9 (L) 08/10/2022    HCT 29 (L) 08/10/2022    MCV 95 08/10/2022     08/10/2022     Recent Labs   Lab 08/09/22  0329 08/10/22  0333   FREET4 2.23* 2.12*     Lab Results   Component Value Date    HGBA1C 5.4 04/26/2021       Nutritional status:   Body mass index is 22.69 kg/m².  Lab Results   Component Value Date    ALBUMIN 2.4 (L) 08/10/2022    ALBUMIN 2.4 (L) 08/09/2022    ALBUMIN 2.4 (L) 08/09/2022     Lab Results   Component Value Date    PREALBUMIN 28 08/05/2022    PREALBUMIN 13 (L) 07/29/2022    PREALBUMIN 11 (L) 07/17/2022       Estimated Creatinine Clearance: 48.5 mL/min (A) (based on SCr of 1.9 mg/dL (H)).    Accu-Checks  Recent Labs     08/08/22  2354 08/09/22  0331 08/09/22  0800 08/09/22  1056 08/09/22  1703 08/09/22  2008 08/09/22  2055 08/10/22  0038 08/10/22  0333 08/10/22  0811   POCTGLUCOSE 127* 112* 106 131* 169* 127* 136* 117* 107 107       Current Medications and/or Treatments Impacting Glycemic Control  Immunotherapy:    Immunosuppressants       None          Steroids:   Hormones (From admission, onward)                Start     Stop Route Frequency Ordered    08/05/22 0900  predniSONE tablet 60 mg         -- PER NG TUBE Daily 08/05/22 0740    08/02/22 1931  melatonin tablet 6 mg         -- OG Nightly PRN 08/02/22 1832          Pressors:    Autonomic Drugs (From admission, onward)                Start     Stop Route Frequency Ordered    07/29/22 0011  EPINEPHrine (ADRENALIN) 1 mg/mL injection        Note to Pharmacy: Created by cabinet override    07/29 1214   07/29/22 0011          Hyperglycemia/Diabetes Medications:   Antihyperglycemics (From  admission, onward)                Start     Stop Route Frequency Ordered    08/05/22 1200  insulin aspart U-100 pen 5 Units         -- SubQ 6 times per day 08/05/22 0930    08/03/22 2100  insulin detemir U-100 pen 6 Units         -- SubQ Nightly 08/03/22 1900    07/30/22 1023  insulin aspart U-100 pen 0-5 Units         -- SubQ Every 4 hours PRN 07/30/22 0925            ASSESSMENT and PLAN    Amiodarone-induced hyperthyroidism  Key History and Diagnostic Findings  - History of AIT type 2 (TRAb and TSI negative; Thyroid US unremarkable with low vascularity)  - Weaned off methimazole and prednisone by May 2020, then developed hypothyroidism, Levothyroxine started and dose titrated per TFTs. Prior to current admission he was on Levothyroxine 112 mcg daily  - Labs consistent with hypothyroidism until current admission, initially on 7/13, with low TSH and elevated fT4. Repeat TFTs on 7/27 with worsening hyperthyroidism. He continued to receive LT4 112 mcg through 7/28. He remains on amiodarone for episodes of Ventricular Tachycardia  - Suspect current hyperthyroidism is AIT, however continued exogenous LT4 certainly contribute to current presentation   - Free T4 continues to improve, today at 2.12    Lab Results   Component Value Date    TSH <0.010 (L) 07/27/2022    TSH 0.111 (L) 07/13/2022    TSH 4.079 (H) 03/17/2022    FREET4 2.12 (H) 08/10/2022    FREET4 2.23 (H) 08/09/2022    FREET4 2.43 (H) 08/08/2022     Plan  - Strongly suspect AIT (type 2); continuing empiric treatment for both  - Heparin can cause a false elevation in free T4 as it displaces free T4 from TBG; will follow-up direct dialysis result which is still pending  - Continue Methimazole 20 mg TID   - Can decrease Prednisone to 40 mg daily  - Continue checking daily free T4    Hyperglycemia  Key History and Diagnostic Findings  - Hyperglycemia noted once starting TPN in addition to steroids  - No prior history of diabetes; most recent A1c of 5.4 on  04/26/2021  - Blood sugars at goal for now    Plan  - Continue levemir 6 units QHS  - Continue on Aspart 5 units every 4 hour with low correction while on Tube Feeds  - Please notify endocrine if any change in tube feeds as this will change insulin requirements.  - Please hold insulin if tube feeds are held for some reason  - Please ensure that accuchecks are being documented every 4 hours    Chronic combined systolic and diastolic heart failure  Managed per cardiology          Poncho Guajardo MD  Endocrinology  [facility]

## 2022-08-10 NOTE — ASSESSMENT & PLAN NOTE
Key History and Diagnostic Findings  - Hyperglycemia noted once starting TPN in addition to steroids  - No prior history of diabetes; most recent A1c of 5.4 on 04/26/2021  - Blood sugars at goal for now    Plan  - Continue levemir 6 units QHS  - Continue on Aspart 5 units every 4 hour with low correction while on Tube Feeds  - Please notify endocrine if any change in tube feeds as this will change insulin requirements.  - Please hold insulin if tube feeds are held for some reason  - Please ensure that accuchecks are being documented every 4 hours

## 2022-08-11 LAB
ALBUMIN SERPL BCP-MCNC: 2.2 G/DL (ref 3.5–5.2)
ALBUMIN SERPL BCP-MCNC: 2.2 G/DL (ref 3.5–5.2)
ALBUMIN SERPL BCP-MCNC: 2.3 G/DL (ref 3.5–5.2)
ALBUMIN SERPL BCP-MCNC: 2.3 G/DL (ref 3.5–5.2)
ALLENS TEST: ABNORMAL
ALP SERPL-CCNC: 157 U/L (ref 55–135)
ALP SERPL-CCNC: 161 U/L (ref 55–135)
ALP SERPL-CCNC: 171 U/L (ref 55–135)
ALP SERPL-CCNC: 181 U/L (ref 55–135)
ALT SERPL W/O P-5'-P-CCNC: 53 U/L (ref 10–44)
ALT SERPL W/O P-5'-P-CCNC: 53 U/L (ref 10–44)
ALT SERPL W/O P-5'-P-CCNC: 55 U/L (ref 10–44)
ALT SERPL W/O P-5'-P-CCNC: 56 U/L (ref 10–44)
ANION GAP SERPL CALC-SCNC: 7 MMOL/L (ref 8–16)
ANION GAP SERPL CALC-SCNC: 8 MMOL/L (ref 8–16)
ANION GAP SERPL CALC-SCNC: 9 MMOL/L (ref 8–16)
ANION GAP SERPL CALC-SCNC: 9 MMOL/L (ref 8–16)
ANISOCYTOSIS BLD QL SMEAR: SLIGHT
ANISOCYTOSIS BLD QL SMEAR: SLIGHT
APTT BLDCRRT: 27.9 SEC (ref 21–32)
APTT BLDCRRT: 28.7 SEC (ref 21–32)
AST SERPL-CCNC: 44 U/L (ref 10–40)
AST SERPL-CCNC: 48 U/L (ref 10–40)
AST SERPL-CCNC: 52 U/L (ref 10–40)
AST SERPL-CCNC: 54 U/L (ref 10–40)
BACTERIA SPEC AEROBE CULT: NORMAL
BACTERIA SPEC AEROBE CULT: NORMAL
BASO STIPL BLD QL SMEAR: ABNORMAL
BASOPHILS # BLD AUTO: 0.03 K/UL (ref 0–0.2)
BASOPHILS # BLD AUTO: 0.06 K/UL (ref 0–0.2)
BASOPHILS # BLD AUTO: 0.06 K/UL (ref 0–0.2)
BASOPHILS # BLD AUTO: ABNORMAL K/UL (ref 0–0.2)
BASOPHILS NFR BLD: 0 % (ref 0–1.9)
BASOPHILS NFR BLD: 0.1 % (ref 0–1.9)
BASOPHILS NFR BLD: 0.2 % (ref 0–1.9)
BASOPHILS NFR BLD: 0.2 % (ref 0–1.9)
BILIRUB SERPL-MCNC: 3.2 MG/DL (ref 0.1–1)
BILIRUB SERPL-MCNC: 3.4 MG/DL (ref 0.1–1)
BILIRUB SERPL-MCNC: 3.5 MG/DL (ref 0.1–1)
BILIRUB SERPL-MCNC: 3.6 MG/DL (ref 0.1–1)
BUN SERPL-MCNC: 57 MG/DL (ref 6–20)
BUN SERPL-MCNC: 60 MG/DL (ref 6–20)
BUN SERPL-MCNC: 64 MG/DL (ref 6–20)
BUN SERPL-MCNC: 65 MG/DL (ref 6–20)
CALCIUM SERPL-MCNC: 9.2 MG/DL (ref 8.7–10.5)
CALCIUM SERPL-MCNC: 9.4 MG/DL (ref 8.7–10.5)
CALCIUM SERPL-MCNC: 9.5 MG/DL (ref 8.7–10.5)
CALCIUM SERPL-MCNC: 9.6 MG/DL (ref 8.7–10.5)
CHLORIDE SERPL-SCNC: 113 MMOL/L (ref 95–110)
CHLORIDE SERPL-SCNC: 113 MMOL/L (ref 95–110)
CHLORIDE SERPL-SCNC: 114 MMOL/L (ref 95–110)
CHLORIDE SERPL-SCNC: 115 MMOL/L (ref 95–110)
CO2 SERPL-SCNC: 19 MMOL/L (ref 23–29)
CO2 SERPL-SCNC: 20 MMOL/L (ref 23–29)
CREAT SERPL-MCNC: 1.6 MG/DL (ref 0.5–1.4)
CREAT SERPL-MCNC: 1.6 MG/DL (ref 0.5–1.4)
CREAT SERPL-MCNC: 1.7 MG/DL (ref 0.5–1.4)
CREAT SERPL-MCNC: 1.7 MG/DL (ref 0.5–1.4)
DACRYOCYTES BLD QL SMEAR: ABNORMAL
DELSYS: ABNORMAL
DIFFERENTIAL METHOD: ABNORMAL
DOHLE BOD BLD QL SMEAR: PRESENT
EOSINOPHIL # BLD AUTO: 0 K/UL (ref 0–0.5)
EOSINOPHIL # BLD AUTO: 0 K/UL (ref 0–0.5)
EOSINOPHIL # BLD AUTO: 0.1 K/UL (ref 0–0.5)
EOSINOPHIL # BLD AUTO: ABNORMAL K/UL (ref 0–0.5)
EOSINOPHIL NFR BLD: 0 % (ref 0–8)
EOSINOPHIL NFR BLD: 0.2 % (ref 0–8)
ERYTHROCYTE [DISTWIDTH] IN BLOOD BY AUTOMATED COUNT: 18.9 % (ref 11.5–14.5)
ERYTHROCYTE [DISTWIDTH] IN BLOOD BY AUTOMATED COUNT: 19 % (ref 11.5–14.5)
ERYTHROCYTE [DISTWIDTH] IN BLOOD BY AUTOMATED COUNT: 19.2 % (ref 11.5–14.5)
ERYTHROCYTE [DISTWIDTH] IN BLOOD BY AUTOMATED COUNT: 23.1 % (ref 11.5–14.5)
ERYTHROCYTE [SEDIMENTATION RATE] IN BLOOD BY WESTERGREN METHOD: 16 MM/H
EST. GFR  (NO RACE VARIABLE): 47 ML/MIN/1.73 M^2
EST. GFR  (NO RACE VARIABLE): 47 ML/MIN/1.73 M^2
EST. GFR  (NO RACE VARIABLE): 50.6 ML/MIN/1.73 M^2
EST. GFR  (NO RACE VARIABLE): 50.6 ML/MIN/1.73 M^2
FACT X PPP CHRO-ACNC: 0.12 IU/ML (ref 0.3–0.7)
FACT X PPP CHRO-ACNC: 0.16 IU/ML (ref 0.3–0.7)
FIO2: 30
GLUCOSE SERPL-MCNC: 105 MG/DL (ref 70–110)
GLUCOSE SERPL-MCNC: 108 MG/DL (ref 70–110)
GLUCOSE SERPL-MCNC: 115 MG/DL (ref 70–110)
GLUCOSE SERPL-MCNC: 121 MG/DL (ref 70–110)
GRAM STN SPEC: NORMAL
HCO3 UR-SCNC: 26.3 MMOL/L (ref 24–28)
HCT VFR BLD AUTO: 28.2 % (ref 40–54)
HCT VFR BLD AUTO: 29.7 % (ref 40–54)
HCT VFR BLD AUTO: 30.1 % (ref 40–54)
HCT VFR BLD AUTO: 31.2 % (ref 40–54)
HGB BLD-MCNC: 8.2 G/DL (ref 14–18)
HGB BLD-MCNC: 8.5 G/DL (ref 14–18)
HGB BLD-MCNC: 8.7 G/DL (ref 14–18)
HGB BLD-MCNC: 8.9 G/DL (ref 14–18)
HYPOCHROMIA BLD QL SMEAR: ABNORMAL
HYPOCHROMIA BLD QL SMEAR: ABNORMAL
IMM GRANULOCYTES # BLD AUTO: 1.03 K/UL (ref 0–0.04)
IMM GRANULOCYTES # BLD AUTO: 1.07 K/UL (ref 0–0.04)
IMM GRANULOCYTES # BLD AUTO: 1.14 K/UL (ref 0–0.04)
IMM GRANULOCYTES # BLD AUTO: ABNORMAL K/UL (ref 0–0.04)
IMM GRANULOCYTES NFR BLD AUTO: 3.7 % (ref 0–0.5)
IMM GRANULOCYTES NFR BLD AUTO: 3.8 % (ref 0–0.5)
IMM GRANULOCYTES NFR BLD AUTO: 3.8 % (ref 0–0.5)
IMM GRANULOCYTES NFR BLD AUTO: ABNORMAL % (ref 0–0.5)
INR PPP: 1 (ref 0.8–1.2)
INR PPP: 1.1 (ref 0.8–1.2)
LDH SERPL L TO P-CCNC: 397 U/L (ref 110–260)
LYMPHOCYTES # BLD AUTO: 0.6 K/UL (ref 1–4.8)
LYMPHOCYTES # BLD AUTO: 0.7 K/UL (ref 1–4.8)
LYMPHOCYTES # BLD AUTO: 0.8 K/UL (ref 1–4.8)
LYMPHOCYTES # BLD AUTO: ABNORMAL K/UL (ref 1–4.8)
LYMPHOCYTES NFR BLD: 2 % (ref 18–48)
LYMPHOCYTES NFR BLD: 2.2 % (ref 18–48)
LYMPHOCYTES NFR BLD: 2.5 % (ref 18–48)
LYMPHOCYTES NFR BLD: 2.6 % (ref 18–48)
MAGNESIUM SERPL-MCNC: 2.9 MG/DL (ref 1.6–2.6)
MAGNESIUM SERPL-MCNC: 3 MG/DL (ref 1.6–2.6)
MAGNESIUM SERPL-MCNC: 3.1 MG/DL (ref 1.6–2.6)
MAGNESIUM SERPL-MCNC: 3.1 MG/DL (ref 1.6–2.6)
MCH RBC QN AUTO: 28.1 PG (ref 27–31)
MCH RBC QN AUTO: 28.3 PG (ref 27–31)
MCH RBC QN AUTO: 28.4 PG (ref 27–31)
MCH RBC QN AUTO: 29 PG (ref 27–31)
MCHC RBC AUTO-ENTMCNC: 28.2 G/DL (ref 32–36)
MCHC RBC AUTO-ENTMCNC: 28.5 G/DL (ref 32–36)
MCHC RBC AUTO-ENTMCNC: 29.1 G/DL (ref 32–36)
MCHC RBC AUTO-ENTMCNC: 29.3 G/DL (ref 32–36)
MCV RBC AUTO: 98 FL (ref 82–98)
MCV RBC AUTO: 99 FL (ref 82–98)
MODE: ABNORMAL
MONOCYTES # BLD AUTO: 1 K/UL (ref 0.3–1)
MONOCYTES # BLD AUTO: 1.1 K/UL (ref 0.3–1)
MONOCYTES # BLD AUTO: 1.3 K/UL (ref 0.3–1)
MONOCYTES # BLD AUTO: ABNORMAL K/UL (ref 0.3–1)
MONOCYTES NFR BLD: 3.4 % (ref 4–15)
MONOCYTES NFR BLD: 3.9 % (ref 4–15)
MONOCYTES NFR BLD: 4.2 % (ref 4–15)
MONOCYTES NFR BLD: 5 % (ref 4–15)
MYELOCYTES NFR BLD MANUAL: 2 %
NEUTROPHILS # BLD AUTO: 24.2 K/UL (ref 1.8–7.7)
NEUTROPHILS # BLD AUTO: 25.9 K/UL (ref 1.8–7.7)
NEUTROPHILS # BLD AUTO: 27 K/UL (ref 1.8–7.7)
NEUTROPHILS NFR BLD: 89.1 % (ref 38–73)
NEUTROPHILS NFR BLD: 89.6 % (ref 38–73)
NEUTROPHILS NFR BLD: 90 % (ref 38–73)
NEUTROPHILS NFR BLD: 90.5 % (ref 38–73)
NEUTS BAND NFR BLD MANUAL: 1 %
NRBC BLD-RTO: 0 /100 WBC
OVALOCYTES BLD QL SMEAR: ABNORMAL
OVALOCYTES BLD QL SMEAR: ABNORMAL
PCO2 BLDA: 41.7 MMHG (ref 35–45)
PEEP: 5
PH SMN: 7.41 [PH] (ref 7.35–7.45)
PHOSPHATE SERPL-MCNC: 2.8 MG/DL (ref 2.7–4.5)
PHOSPHATE SERPL-MCNC: 3.1 MG/DL (ref 2.7–4.5)
PHOSPHATE SERPL-MCNC: 3.3 MG/DL (ref 2.7–4.5)
PHOSPHATE SERPL-MCNC: 3.5 MG/DL (ref 2.7–4.5)
PLATELET # BLD AUTO: 194 K/UL (ref 150–450)
PLATELET # BLD AUTO: 259 K/UL (ref 150–450)
PLATELET # BLD AUTO: 271 K/UL (ref 150–450)
PLATELET # BLD AUTO: 280 K/UL (ref 150–450)
PLATELET BLD QL SMEAR: ABNORMAL
PMV BLD AUTO: 12.2 FL (ref 9.2–12.9)
PMV BLD AUTO: 12.6 FL (ref 9.2–12.9)
PMV BLD AUTO: 12.8 FL (ref 9.2–12.9)
PMV BLD AUTO: 13.9 FL (ref 9.2–12.9)
PO2 BLDA: 129 MMHG (ref 80–100)
POC BE: 2 MMOL/L
POC SATURATED O2: 99 % (ref 95–100)
POC TCO2: 28 MMOL/L (ref 23–27)
POCT GLUCOSE: 100 MG/DL (ref 70–110)
POCT GLUCOSE: 121 MG/DL (ref 70–110)
POCT GLUCOSE: 129 MG/DL (ref 70–110)
POCT GLUCOSE: 131 MG/DL (ref 70–110)
POCT GLUCOSE: 157 MG/DL (ref 70–110)
POCT GLUCOSE: 172 MG/DL (ref 70–110)
POCT GLUCOSE: 176 MG/DL (ref 70–110)
POIKILOCYTOSIS BLD QL SMEAR: SLIGHT
POIKILOCYTOSIS BLD QL SMEAR: SLIGHT
POLYCHROMASIA BLD QL SMEAR: ABNORMAL
POLYCHROMASIA BLD QL SMEAR: ABNORMAL
POTASSIUM SERPL-SCNC: 4.9 MMOL/L (ref 3.5–5.1)
POTASSIUM SERPL-SCNC: 5.1 MMOL/L (ref 3.5–5.1)
POTASSIUM SERPL-SCNC: 5.4 MMOL/L (ref 3.5–5.1)
POTASSIUM SERPL-SCNC: 5.6 MMOL/L (ref 3.5–5.1)
PROT SERPL-MCNC: 6.1 G/DL (ref 6–8.4)
PROT SERPL-MCNC: 6.2 G/DL (ref 6–8.4)
PROT SERPL-MCNC: 6.2 G/DL (ref 6–8.4)
PROT SERPL-MCNC: 6.6 G/DL (ref 6–8.4)
PROTHROMBIN TIME: 10.9 SEC (ref 9–12.5)
PROTHROMBIN TIME: 11.1 SEC (ref 9–12.5)
RBC # BLD AUTO: 2.89 M/UL (ref 4.6–6.2)
RBC # BLD AUTO: 3 M/UL (ref 4.6–6.2)
RBC # BLD AUTO: 3.03 M/UL (ref 4.6–6.2)
RBC # BLD AUTO: 3.14 M/UL (ref 4.6–6.2)
SAMPLE: ABNORMAL
SCHISTOCYTES BLD QL SMEAR: PRESENT
SITE: ABNORMAL
SODIUM SERPL-SCNC: 141 MMOL/L (ref 136–145)
SODIUM SERPL-SCNC: 141 MMOL/L (ref 136–145)
SODIUM SERPL-SCNC: 142 MMOL/L (ref 136–145)
SODIUM SERPL-SCNC: 143 MMOL/L (ref 136–145)
SPHEROCYTES BLD QL SMEAR: ABNORMAL
SPHEROCYTES BLD QL SMEAR: ABNORMAL
T4 FREE SERPL-MCNC: 1.67 NG/DL (ref 0.71–1.51)
TOXIC GRANULES BLD QL SMEAR: PRESENT
VT: 550
WBC # BLD AUTO: 27.02 K/UL (ref 3.9–12.7)
WBC # BLD AUTO: 28.63 K/UL (ref 3.9–12.7)
WBC # BLD AUTO: 28.7 K/UL (ref 3.9–12.7)
WBC # BLD AUTO: 30.24 K/UL (ref 3.9–12.7)
WBC TOXIC VACUOLES BLD QL SMEAR: PRESENT

## 2022-08-11 PROCEDURE — 84100 ASSAY OF PHOSPHORUS: CPT | Mod: 91 | Performed by: INTERNAL MEDICINE

## 2022-08-11 PROCEDURE — 94761 N-INVAS EAR/PLS OXIMETRY MLT: CPT

## 2022-08-11 PROCEDURE — 85520 HEPARIN ASSAY: CPT | Performed by: INTERNAL MEDICINE

## 2022-08-11 PROCEDURE — 99900026 HC AIRWAY MAINTENANCE (STAT)

## 2022-08-11 PROCEDURE — 25000003 PHARM REV CODE 250: Performed by: STUDENT IN AN ORGANIZED HEALTH CARE EDUCATION/TRAINING PROGRAM

## 2022-08-11 PROCEDURE — 82803 BLOOD GASES ANY COMBINATION: CPT

## 2022-08-11 PROCEDURE — 97535 SELF CARE MNGMENT TRAINING: CPT

## 2022-08-11 PROCEDURE — 97530 THERAPEUTIC ACTIVITIES: CPT

## 2022-08-11 PROCEDURE — 97110 THERAPEUTIC EXERCISES: CPT

## 2022-08-11 PROCEDURE — 27000248 HC VAD-ADDITIONAL DAY

## 2022-08-11 PROCEDURE — 84100 ASSAY OF PHOSPHORUS: CPT | Mod: 91 | Performed by: STUDENT IN AN ORGANIZED HEALTH CARE EDUCATION/TRAINING PROGRAM

## 2022-08-11 PROCEDURE — 85025 COMPLETE CBC W/AUTO DIFF WBC: CPT | Mod: 91 | Performed by: STUDENT IN AN ORGANIZED HEALTH CARE EDUCATION/TRAINING PROGRAM

## 2022-08-11 PROCEDURE — 92522 EVALUATE SPEECH PRODUCTION: CPT

## 2022-08-11 PROCEDURE — 84134 ASSAY OF PREALBUMIN: CPT | Performed by: STUDENT IN AN ORGANIZED HEALTH CARE EDUCATION/TRAINING PROGRAM

## 2022-08-11 PROCEDURE — 94003 VENT MGMT INPAT SUBQ DAY: CPT

## 2022-08-11 PROCEDURE — 83735 ASSAY OF MAGNESIUM: CPT | Mod: 91 | Performed by: INTERNAL MEDICINE

## 2022-08-11 PROCEDURE — 25000003 PHARM REV CODE 250: Performed by: INTERNAL MEDICINE

## 2022-08-11 PROCEDURE — 99233 SBSQ HOSP IP/OBS HIGH 50: CPT | Mod: ,,, | Performed by: INTERNAL MEDICINE

## 2022-08-11 PROCEDURE — 99232 SBSQ HOSP IP/OBS MODERATE 35: CPT | Mod: ,,, | Performed by: INTERNAL MEDICINE

## 2022-08-11 PROCEDURE — 25000003 PHARM REV CODE 250

## 2022-08-11 PROCEDURE — 63600175 PHARM REV CODE 636 W HCPCS: Performed by: INTERNAL MEDICINE

## 2022-08-11 PROCEDURE — 80053 COMPREHEN METABOLIC PANEL: CPT | Mod: 91 | Performed by: STUDENT IN AN ORGANIZED HEALTH CARE EDUCATION/TRAINING PROGRAM

## 2022-08-11 PROCEDURE — 99233 PR SUBSEQUENT HOSPITAL CARE,LEVL III: ICD-10-PCS | Mod: ,,, | Performed by: INTERNAL MEDICINE

## 2022-08-11 PROCEDURE — 85610 PROTHROMBIN TIME: CPT | Performed by: THORACIC SURGERY (CARDIOTHORACIC VASCULAR SURGERY)

## 2022-08-11 PROCEDURE — 80053 COMPREHEN METABOLIC PANEL: CPT | Mod: 91 | Performed by: INTERNAL MEDICINE

## 2022-08-11 PROCEDURE — 85025 COMPLETE CBC W/AUTO DIFF WBC: CPT | Mod: 91 | Performed by: THORACIC SURGERY (CARDIOTHORACIC VASCULAR SURGERY)

## 2022-08-11 PROCEDURE — 80053 COMPREHEN METABOLIC PANEL: CPT | Performed by: THORACIC SURGERY (CARDIOTHORACIC VASCULAR SURGERY)

## 2022-08-11 PROCEDURE — 63600175 PHARM REV CODE 636 W HCPCS: Performed by: STUDENT IN AN ORGANIZED HEALTH CARE EDUCATION/TRAINING PROGRAM

## 2022-08-11 PROCEDURE — 37799 UNLISTED PX VASCULAR SURGERY: CPT

## 2022-08-11 PROCEDURE — 99232 PR SUBSEQUENT HOSPITAL CARE,LEVL II: ICD-10-PCS | Mod: ,,, | Performed by: INTERNAL MEDICINE

## 2022-08-11 PROCEDURE — 83735 ASSAY OF MAGNESIUM: CPT | Performed by: THORACIC SURGERY (CARDIOTHORACIC VASCULAR SURGERY)

## 2022-08-11 PROCEDURE — 94640 AIRWAY INHALATION TREATMENT: CPT

## 2022-08-11 PROCEDURE — 85610 PROTHROMBIN TIME: CPT | Mod: 91 | Performed by: INTERNAL MEDICINE

## 2022-08-11 PROCEDURE — 27000221 HC OXYGEN, UP TO 24 HOURS

## 2022-08-11 PROCEDURE — 92610 EVALUATE SWALLOWING FUNCTION: CPT

## 2022-08-11 PROCEDURE — 25000242 PHARM REV CODE 250 ALT 637 W/ HCPCS: Performed by: THORACIC SURGERY (CARDIOTHORACIC VASCULAR SURGERY)

## 2022-08-11 PROCEDURE — 85730 THROMBOPLASTIN TIME PARTIAL: CPT | Mod: 91 | Performed by: STUDENT IN AN ORGANIZED HEALTH CARE EDUCATION/TRAINING PROGRAM

## 2022-08-11 PROCEDURE — 84100 ASSAY OF PHOSPHORUS: CPT | Performed by: THORACIC SURGERY (CARDIOTHORACIC VASCULAR SURGERY)

## 2022-08-11 PROCEDURE — 85730 THROMBOPLASTIN TIME PARTIAL: CPT | Performed by: INTERNAL MEDICINE

## 2022-08-11 PROCEDURE — 85520 HEPARIN ASSAY: CPT | Mod: 91 | Performed by: THORACIC SURGERY (CARDIOTHORACIC VASCULAR SURGERY)

## 2022-08-11 PROCEDURE — 84439 ASSAY OF FREE THYROXINE: CPT | Performed by: THORACIC SURGERY (CARDIOTHORACIC VASCULAR SURGERY)

## 2022-08-11 PROCEDURE — 83735 ASSAY OF MAGNESIUM: CPT | Mod: 91 | Performed by: STUDENT IN AN ORGANIZED HEALTH CARE EDUCATION/TRAINING PROGRAM

## 2022-08-11 PROCEDURE — 93750 PR INTERROGATE VENT ASSIST DEV, IN PERSON, W PHYSICIAN ANALYSIS: ICD-10-PCS | Mod: ,,, | Performed by: INTERNAL MEDICINE

## 2022-08-11 PROCEDURE — 99900035 HC TECH TIME PER 15 MIN (STAT)

## 2022-08-11 PROCEDURE — 25000242 PHARM REV CODE 250 ALT 637 W/ HCPCS

## 2022-08-11 PROCEDURE — 93750 INTERROGATION VAD IN PERSON: CPT | Mod: ,,, | Performed by: INTERNAL MEDICINE

## 2022-08-11 PROCEDURE — 25000003 PHARM REV CODE 250: Performed by: PHYSICIAN ASSISTANT

## 2022-08-11 PROCEDURE — 83615 LACTATE (LD) (LDH) ENZYME: CPT | Performed by: STUDENT IN AN ORGANIZED HEALTH CARE EDUCATION/TRAINING PROGRAM

## 2022-08-11 PROCEDURE — 20000000 HC ICU ROOM

## 2022-08-11 RX ORDER — LIDOCAINE HYDROCHLORIDE 10 MG/ML
INJECTION, SOLUTION EPIDURAL; INFILTRATION; INTRACAUDAL; PERINEURAL
Status: COMPLETED
Start: 2022-08-11 | End: 2022-08-11

## 2022-08-11 RX ORDER — LIDOCAINE HYDROCHLORIDE 10 MG/ML
1 INJECTION, SOLUTION EPIDURAL; INFILTRATION; INTRACAUDAL; PERINEURAL ONCE
Status: COMPLETED | OUTPATIENT
Start: 2022-08-11 | End: 2022-08-11

## 2022-08-11 RX ORDER — METHIMAZOLE 10 MG/1
20 TABLET ORAL DAILY
Status: DISCONTINUED | OUTPATIENT
Start: 2022-08-12 | End: 2022-08-14

## 2022-08-11 RX ADMIN — ACETYLCYSTEINE 4 ML: 100 SOLUTION ORAL; RESPIRATORY (INHALATION) at 07:08

## 2022-08-11 RX ADMIN — AMIODARONE HYDROCHLORIDE 400 MG: 200 TABLET ORAL at 08:08

## 2022-08-11 RX ADMIN — CHOLESTYRAMINE 4 G: 4 POWDER, FOR SUSPENSION ORAL at 09:08

## 2022-08-11 RX ADMIN — INSULIN ASPART 4 UNITS: 100 INJECTION, SOLUTION INTRAVENOUS; SUBCUTANEOUS at 12:08

## 2022-08-11 RX ADMIN — HEPARIN SODIUM 14 UNITS/KG/HR: 5000 INJECTION INTRAVENOUS; SUBCUTANEOUS at 04:08

## 2022-08-11 RX ADMIN — MEXILETINE HYDROCHLORIDE 400 MG: 200 CAPSULE ORAL at 09:08

## 2022-08-11 RX ADMIN — MEROPENEM 2 G: 1 INJECTION INTRAVENOUS at 09:08

## 2022-08-11 RX ADMIN — ASPIRIN 81 MG CHEWABLE TABLET 81 MG: 81 TABLET CHEWABLE at 08:08

## 2022-08-11 RX ADMIN — LEVALBUTEROL HYDROCHLORIDE 0.63 MG: 0.63 SOLUTION RESPIRATORY (INHALATION) at 07:08

## 2022-08-11 RX ADMIN — LEVALBUTEROL HYDROCHLORIDE 0.63 MG: 0.63 SOLUTION RESPIRATORY (INHALATION) at 01:08

## 2022-08-11 RX ADMIN — MEROPENEM 2 G: 1 INJECTION INTRAVENOUS at 02:08

## 2022-08-11 RX ADMIN — INSULIN DETEMIR 6 UNITS: 100 INJECTION, SOLUTION SUBCUTANEOUS at 09:08

## 2022-08-11 RX ADMIN — PREDNISONE 40 MG: 20 TABLET ORAL at 08:08

## 2022-08-11 RX ADMIN — INSULIN ASPART 4 UNITS: 100 INJECTION, SOLUTION INTRAVENOUS; SUBCUTANEOUS at 08:08

## 2022-08-11 RX ADMIN — INSULIN ASPART 4 UNITS: 100 INJECTION, SOLUTION INTRAVENOUS; SUBCUTANEOUS at 09:08

## 2022-08-11 RX ADMIN — CHOLESTYRAMINE 4 G: 4 POWDER, FOR SUSPENSION ORAL at 01:08

## 2022-08-11 RX ADMIN — CHOLESTYRAMINE 4 G: 4 POWDER, FOR SUSPENSION ORAL at 05:08

## 2022-08-11 RX ADMIN — ACETYLCYSTEINE 4 ML: 100 SOLUTION ORAL; RESPIRATORY (INHALATION) at 01:08

## 2022-08-11 RX ADMIN — MEROPENEM 2 G: 1 INJECTION INTRAVENOUS at 05:08

## 2022-08-11 RX ADMIN — LIDOCAINE HYDROCHLORIDE 10 MG: 10 INJECTION, SOLUTION EPIDURAL; INFILTRATION; INTRACAUDAL; PERINEURAL at 02:08

## 2022-08-11 RX ADMIN — LIDOCAINE HYDROCHLORIDE 10 MG: 10 INJECTION, SOLUTION EPIDURAL; INFILTRATION; INTRACAUDAL at 02:08

## 2022-08-11 RX ADMIN — MEXILETINE HYDROCHLORIDE 400 MG: 200 CAPSULE ORAL at 01:08

## 2022-08-11 RX ADMIN — INSULIN ASPART 4 UNITS: 100 INJECTION, SOLUTION INTRAVENOUS; SUBCUTANEOUS at 04:08

## 2022-08-11 RX ADMIN — ACETYLCYSTEINE 4 ML: 100 SOLUTION ORAL; RESPIRATORY (INHALATION) at 08:08

## 2022-08-11 RX ADMIN — LEVALBUTEROL HYDROCHLORIDE 0.63 MG: 0.63 SOLUTION RESPIRATORY (INHALATION) at 08:08

## 2022-08-11 RX ADMIN — METHIMAZOLE 20 MG: 10 TABLET ORAL at 09:08

## 2022-08-11 RX ADMIN — MEXILETINE HYDROCHLORIDE 400 MG: 200 CAPSULE ORAL at 12:08

## 2022-08-11 RX ADMIN — MEXILETINE HYDROCHLORIDE 400 MG: 200 CAPSULE ORAL at 05:08

## 2022-08-11 RX ADMIN — INSULIN ASPART 4 UNITS: 100 INJECTION, SOLUTION INTRAVENOUS; SUBCUTANEOUS at 01:08

## 2022-08-11 RX ADMIN — PANTOPRAZOLE SODIUM 40 MG: 40 GRANULE, DELAYED RELEASE ORAL at 08:08

## 2022-08-11 NOTE — NURSING
Ptt 27.9 (subtherapeutic); however Trach site with continued oozing. HF Fellow notified. Results acknowledged. Continuing to hold Hep titration and Awaiting further orders.

## 2022-08-11 NOTE — NURSING
Pt lying in the bed, no family at bedside. Pt on trach collar with speaking valve on. Pt states that he is okay but very depressed. Encouragement and emotional support given.    I reached out to Dr. Ellsion regarding medication for depression.     I also called wife per pt request to update on care.

## 2022-08-11 NOTE — PROGRESS NOTES
[facility]  Heart Failure  Progress Note     Hospital Length of Stay: 45  Interval History:  - Continue to ooze from the trach site. Heparin at  Minimal dosage.   - Denies any complaints.   - No n/v. No abdominal discomfort.     Vitals:  Temp:  [97.6 °F (36.4 °C)-98.2 °F (36.8 °C)] 97.6 °F (36.4 °C)  Pulse:  [70-83] 83  Resp:  [16-38] 28  SpO2:  [96 %-100 %] 100 %  BP: (74-84)/(0) 84/0  Arterial Line BP: (74-92)/(56-75) 79/66    Intake/Output    Intake/Output Summary (Last 24 hours) at 8/11/2022 1114  Last data filed at 8/11/2022 1000  Gross per 24 hour   Intake 1922.9 ml   Output 1645 ml   Net 277.9 ml     Physical Exam  Gen: Trach in place with blood soaked gauze, alert and awake.   HEENT: Pupils equal and reactive to light. Icterus +  Cardio: Regular rate, VAD hum obstructing heart sounds  Resp: No additional sound heard.   Abd: Soft, non-tender, non-distended  Skin: Warm,  trace peripheral edema  Neuro: No gross abnormalities.   Psych: Normal mood and affect.      Labs:  Recent Labs   Lab 08/10/22  1204 08/10/22  2004 08/11/22  0305   WBC 34.31* 28.63* 27.02*   HGB 8.8* 8.9* 8.2*   HCT 30.7* 31.2* 28.2*    259 280     Recent Labs   Lab 08/10/22  2004 08/11/22  0305 08/11/22  0404    142 141   K 5.9* 5.4* 5.1   * 113* 113*   CO2 21* 20* 20*   BUN 67* 65* 64*   CREATININE 1.7* 1.7* 1.7*   * 105 108   CALCIUM 9.4 9.5 9.2   MG 3.1* 3.1* 2.9*   PHOS 4.4 3.5 3.3       Micro:    Current Meds:   acetylcysteine 100 mg/ml (10%)  4 mL Nebulization Q6H    amiodarone  400 mg Oral Daily    aspirin  81 mg Per OG tube Daily    cholestyramine  1 packet Per NG tube QID    guaiFENesin 100 mg/5 ml  300 mg Per NG tube Q6H    insulin aspart U-100  4 Units Subcutaneous 6 times per day    insulin detemir U-100  6 Units Subcutaneous QHS    levalbuterol  0.63 mg Nebulization Q6H    magnesium oxide  400 mg Oral BID    meropenem (MERREM) IVPB  2 g Intravenous Q8H    methIMAzole  20  mg Per NG tube BID    mexiletine  400 mg Oral Q8H    ondansetron  4 mg Intravenous Once    pantoprazole  40 mg Per NG tube Daily    predniSONE  40 mg Per NG tube Daily       Continuous Infusions:   dextrose 10 % in water (D10W)      heparin (porcine) in D5W 12 Units/kg/hr (08/11/22 0600)     Assessment and Plan:  Cardiogenic Shock  -Previous HM2, underwent pump exchange to HM3 on 7/13/22 complicated by worsening CS/RV failure requiring [facility] ECMO now decannulated  -Re-intubated on 7/29/22 due to hypercapnic resp failure  - Weaning off ventilator.       Trach site bleeding:   - Continue minimal dosing heparin.   - Surgical team consult.   - Monitor Hb periodically.     LVAD:  - No significant events.   - Functioning well.     Fever: No fever in 6  Days:  Leucocytosis trending down.   ID team on board.   Changed to meropenem .   No growth in cultures yet.      History of ventricular tachycardia/Episode of A flutter 2:1 block:  Patient experienced an episode of VT on 6/30 and experienced an ICD shock thought to be related to hypokalemia (K of 3.1 on 6/30)  Aggressively replete K, keep K>4 and Mg>2  Continue mexiletine PO.  Amio gtt transitioned to PO.     COVID-19 virus infection:  -Previously hypoxic then recovered  -ID was consulted, recommended Remdesivir, course completed     Elevated serum lactate dehydrogenase (LDH):  S/p HM3 exchange for HM2 pump thrombosis  Continue to trend LDH.      Amiodarone induced hypothyrodism initially, now hyperthyroidism:  -Endocrine team on board.  -On prednisone and methimazole.   - Prednisone 40 mg. Decreased today because of concern for side effects.   -Continue to monitor free T4.  - Medications has been changes as per Endocrinology team.        Chronic combined systolic and diastolic heart failure  ADHF  Underwent HM III upgrade on 7/13/22, currently on VA ECMO   Being treated for sepsis.   Currently trach ed. Trach placed on 8/4. (reintubarted 7/29)  Chest tube removal,  bronch, and old driveline removed 7/22      Scot Card MD, FACP  Cardiovascular Disease Fellow PGY4  [facility]

## 2022-08-11 NOTE — ASSESSMENT & PLAN NOTE
Key History and Diagnostic Findings  - History of AIT type 2 (TRAb and TSI negative; Thyroid US unremarkable with low vascularity)  - Weaned off methimazole and prednisone by May 2020, then developed hypothyroidism, Levothyroxine started and dose titrated per TFTs. Prior to current admission he was on Levothyroxine 112 mcg daily  - Labs consistent with hypothyroidism until current admission, initially on 7/13, with low TSH and elevated fT4. Repeat TFTs on 7/27 with worsening hyperthyroidism. He continued to receive LT4 112 mcg through 7/28. He remains on amiodarone for episodes of Ventricular Tachycardia  - Suspect current hyperthyroidism is likely due to AIT-2, however continued exogenous LT4 certainly contribute to current presentation   - Free T4 continues to improve, today at 1.67    Lab Results   Component Value Date    TSH <0.010 (L) 07/27/2022    TSH 0.111 (L) 07/13/2022    TSH 4.079 (H) 03/17/2022    FREET4 1.67 (H) 08/11/2022    FREET4 2.12 (H) 08/10/2022    FREET4 2.23 (H) 08/09/2022     Plan  - Strongly suspect AIT (type 2); continuing empiric treatment for both types  - Heparin can cause a false elevation in free T4 as it displaces free T4 from TBG; will follow-up direct dialysis result which is still pending  - Will decrease Methimazole to 20 mg daily  - Continue Prednisone to 40 mg daily  - Continue checking daily free T4

## 2022-08-11 NOTE — NURSING
Dr Wheeler with general surg at bedside to evaluate Trach site. MD placed sutures to inferior aspect of trach site in attempts to control oozing. Patient tolerated well. WCTM.

## 2022-08-11 NOTE — PLAN OF CARE
"      SICU PLAN OF CARE NOTE    SHIFT EVENTS:  VSS / No acute events this shift. Heparin at 12 units/kg/hr per MD Norberto since trach site oozing increased. POC reviewed with patient and family; questions and concerns addressed. See flow sheets for full assessment details. Will continue to monitor patient closely.      Dx: Left ventricular assist device (LVAD) complication    Vital Signs: BP (!) 84/0 (BP Location: Right arm, Patient Position: Lying)   Pulse 76   Temp 97.8 °F (36.6 °C) (Oral)   Resp 16   Ht 6' 1" (1.854 m)   Wt 78 kg (172 lb)   SpO2 100%   BMI 22.69 kg/m²     Neuro: AAO x4; Follows commands and moves all extremities    Respiratory: Ventilator ACVC FiO2 30% PEEP 5    Cardiac: NSR; HR 70s    Diet: TF at goal 60 cc / hr; no complains of N/V    Gtts: Heparin     Urine Output: Voids Spontaneously 800 cc/shift     Labs: CBC, CMP, MG, Phos, Q8; daily. APTT Q6 until Heparin is therapeutic at 39-54     Accuchecks: Q4     SKIN NOTE:  Skin: Bleeding noted to trach site, team aware; No new skin breakdown or skin tears noted at this time.     Skin precautions maintained including:  Sacrum and heels with foam dressing in place for pressure protection. Frequent weight shift assistance provided Q2 hr to prevent breakdown. Bed plugged in and mattress inflated; Adhesive use limited. Heels elevated off bed. Pressure points protected and positioning supports utilized.  Skin-to-device areas padded. Skin-to-skin areas padded.  "

## 2022-08-11 NOTE — SUBJECTIVE & OBJECTIVE
Interval History: no events overnight, cx remain negative, now has speaking valve on trach, denies any new complaints, WBC downtrending    Review of Systems   Constitutional:  Negative for activity change, appetite change, chills and fever.   HENT:  Negative for congestion, dental problem, ear pain and rhinorrhea.    Eyes:  Negative for pain and discharge.   Respiratory:  Negative for cough and shortness of breath.    Cardiovascular:  Negative for chest pain and leg swelling.   Gastrointestinal:  Negative for abdominal distention, abdominal pain, constipation, diarrhea, nausea and vomiting.   Genitourinary:  Negative for difficulty urinating and dysuria.   Musculoskeletal:  Negative for arthralgias, back pain and joint swelling.   Skin:  Negative for rash and wound.   Neurological:  Negative for dizziness, light-headedness and headaches.   Psychiatric/Behavioral:  Negative for behavioral problems and confusion.    Objective:     Vital Signs (Most Recent):  Temp: 97.6 °F (36.4 °C) (08/11/22 1200)  Pulse: 70 (08/11/22 1500)  Resp: (!) 21 (08/11/22 1500)  BP: (!) 88/0 (08/11/22 1200)  SpO2: 100 % (08/11/22 1200)   Vital Signs (24h Range):  Temp:  [97.6 °F (36.4 °C)-98.2 °F (36.8 °C)] 97.6 °F (36.4 °C)  Pulse:  [70-83] 70  Resp:  [16-38] 21  SpO2:  [98 %-100 %] 100 %  BP: (74-88)/(0) 88/0  Arterial Line BP: (74-92)/(56-81) 91/79     Weight: 88.5 kg (195 lb)  Body mass index is 25.73 kg/m².    Estimated Creatinine Clearance: 59 mL/min (A) (based on SCr of 1.6 mg/dL (H)).    Physical Exam  Constitutional:       General: He is not in acute distress.     Appearance: Normal appearance. He is well-developed. He is not ill-appearing or diaphoretic.   HENT:      Head: Normocephalic and atraumatic.      Right Ear: External ear normal.      Left Ear: External ear normal.      Nose: Nose normal.   Eyes:      General: No scleral icterus.        Right eye: No discharge.         Left eye: No discharge.      Extraocular Movements:  Extraocular movements intact.      Conjunctiva/sclera: Conjunctivae normal.   Cardiovascular:      Rate and Rhythm: Normal rate and regular rhythm.      Pulses: Normal pulses.      Heart sounds: Normal heart sounds. No murmur heard.    No friction rub. No gallop.   Pulmonary:      Effort: Pulmonary effort is normal. No respiratory distress.      Breath sounds: Normal breath sounds. No stridor.      Comments: Dried blood surrounding trach site  Abdominal:      General: Abdomen is flat. Bowel sounds are normal. There is no distension.      Palpations: Abdomen is soft.      Tenderness: There is no abdominal tenderness.      Comments: Old right sided DLES dry on inspection  Left sided DLES bandage clean, dry, and intact    Skin:     General: Skin is warm and dry.      Coloration: Skin is not jaundiced or pale.      Findings: No erythema.      Comments: Midline chest incision healing   R groin wound vac   Neurological:      General: No focal deficit present.      Mental Status: He is alert and oriented to person, place, and time. Mental status is at baseline.   Psychiatric:         Mood and Affect: Mood normal.         Behavior: Behavior normal.         Thought Content: Thought content normal.         Judgment: Judgment normal.       Significant Labs: CBC:   Recent Labs   Lab 08/10/22  2004 08/11/22  0305 08/11/22  1050   WBC 28.63* 27.02* 30.24*   HGB 8.9* 8.2* 8.5*   HCT 31.2* 28.2* 30.1*    280 271     CMP:   Recent Labs   Lab 08/11/22  0305 08/11/22  0404 08/11/22  1050    141 143   K 5.4* 5.1 4.9   * 113* 114*   CO2 20* 20* 20*    108 115*   BUN 65* 64* 60*   CREATININE 1.7* 1.7* 1.6*   CALCIUM 9.5 9.2 9.4   PROT 6.2 6.1 6.2   ALBUMIN 2.3* 2.2* 2.3*   BILITOT 3.5* 3.4* 3.6*   ALKPHOS 161* 157* 171*   AST 48* 54* 44*   ALT 53* 53* 56*   ANIONGAP 9 8 9     Microbiology Results (last 7 days)       Procedure Component Value Units Date/Time    Blood culture [[id number]] Collected: 08/09/22 1102     Order Status: Completed Specimen: Blood from Peripheral, Antecubital, Left Updated: 08/11/22 1212     Blood Culture, Routine No Growth to date      No Growth to date      No Growth to date    Blood culture [[id number]] Collected: 08/09/22 1045    Order Status: Completed Specimen: Blood from Peripheral, Hand, Right Updated: 08/11/22 1212     Blood Culture, Routine No Growth to date      No Growth to date      No Growth to date    Culture, Respiratory with Gram Stain [[id number]] Collected: 08/09/22 1026    Order Status: Completed Specimen: Respiratory from Endotracheal Aspirate Updated: 08/11/22 1051     Respiratory Culture Normal respiratory sachin      No S aureus or Pseudomonas isolated.     Gram Stain (Respiratory) <10 epithelial cells per low power field.     Gram Stain (Respiratory) Rare WBC's     Gram Stain (Respiratory) No organisms seen    Culture, Anaerobe [[id number]] Collected: 08/09/22 1347    Order Status: Completed Specimen: Incision site from Abdomen Updated: 08/11/22 0925     Anaerobic Culture Culture in progress    Narrative:      DL site    Aerobic culture [[id number]] Collected: 08/09/22 1347    Order Status: Completed Specimen: Incision site from Abdomen Updated: 08/10/22 0535     Aerobic Bacterial Culture No growth    Narrative:      DL site.    IV catheter culture [[id number]] Collected: 08/04/22 1525    Order Status: Completed Specimen: Catheter Tip, Intrajugular Updated: 08/06/22 0950     Aerobic Culture - Cath tip No growth            Significant Imaging: I have reviewed all pertinent imaging results/findings within the past 24 hours.

## 2022-08-11 NOTE — PROGRESS NOTES
Interdisciplinary Rounds Report:   Attended interdisciplinary rounds with the [facility]/CTS services including the LVAD Coordinators, social workers, cardiologists, surgeons,  PT/OT/Speech, dietician, and unit charge nurses. Discussed patient status, plan of care, goals of care, including DC date, and post discharge needs. Plan of care will be discussed with the patient and/or family per the physician while rounding on the floor. This is a recurring meeting that is medically and socially necessary to collaborate with the interdisciplinary team to assist patient needs and safe discharge.

## 2022-08-11 NOTE — PT/OT/SLP EVAL
Speech Language Pathology Evaluation  Bedside Swallow    Patient Name:  Tim Richards   MRN:  [id number]  Admitting Diagnosis: Left ventricular assist device (LVAD) complication    Recommendations:                 General Recommendations:  Modified barium swallow study and One Way Speaking Valve  Diet recommendations:  NPO, NPO   Aspiration Precautions: Continue alternate means of nutrition   General Precautions: Standard, LVAD  Communication strategies:  speaking valve     History:     Past Medical History:   Diagnosis Date    Anticoagulant long-term use     CHF (congestive heart failure)     Class 1 obesity due to excess calories with serious comorbidity and body mass index (BMI) of 31.0 to 31.9 in adult     Dilated cardiomyopathy 1/10/2018    Disorder of kidney and ureter     CKD    Encounter for blood transfusion     Gout     HTN (hypertension)     Hx of psychiatric care     ICD (implantable cardioverter-defibrillator) infection 7/1/2020    Psychiatric problem     Thyroid disease     Ventricular tachycardia (paroxysmal)        Past Surgical History:   Procedure Laterality Date    AORTIC VALVULOPLASTY N/A 7/13/2022    Procedure: REPAIR, AORTIC VALVE;  Surgeon: Yg Kaufman MD;  [facility];  Service: Cardiovascular;  Laterality: N/A;    APPLICATION OF WOUND VACUUM-ASSISTED CLOSURE DEVICE N/A 7/15/2022    Procedure: APPLICATION, WOUND VAC;  Surgeon: Yg Kaufman MD;  [facility];  Service: Cardiovascular;  Laterality: N/A;  50 x 5 cm     CARDIAC CATHETERIZATION  Dec. 2012    CARDIAC DEFIBRILLATOR PLACEMENT Left     CRRT-D    COLONOSCOPY N/A 3/6/2018    Procedure: COLONOSCOPY;  Surgeon: Alonso Bone MD;  Location: [facility] ([address] [place] [address]);  Service: Endoscopy;  Laterality: N/A;    COLONOSCOPY N/A 7/17/2019    Procedure: COLONOSCOPY;  Surgeon: Blane Valdez MD;  [facility]  Service: Endoscopy;  Laterality: N/A;    COLONOSCOPY N/A 7/18/2019     Procedure: COLONOSCOPY;  Surgeon: Blane Valdez MD;  Location: [facility] ENDO (2ND FLR);  Service: Endoscopy;  Laterality: N/A;    ESOPHAGOGASTRODUODENOSCOPY N/A 7/17/2019    Procedure: EGD (ESOPHAGOGASTRODUODENOSCOPY);  Surgeon: Blane Valdez MD;  Location: [facility] ENDO (2ND FLR);  Service: Endoscopy;  Laterality: N/A;    ESOPHAGOGASTRODUODENOSCOPY N/A 7/18/2019    Procedure: EGD (ESOPHAGOGASTRODUODENOSCOPY);  Surgeon: Blane Valdez MD;  Location: [facility] ENDO (2ND FLR);  Service: Endoscopy;  Laterality: N/A;    FOREIGN BODY REMOVAL N/A 7/22/2022    Procedure: REMOVAL, FOREIGN BODY;  Surgeon: Yg Kaufman MD;  Location: [facility] OR 2ND FLR;  Service: Cardiovascular;  Laterality: N/A;  LVAD Heartmate 2 drive line removal    INSERTION OF GRAFT TO PERICARDIUM N/A 7/15/2022    Procedure: INSERTION, GRAFT, PERICARDIUM;  Surgeon: Yg Kaufman MD;  Location: [facility] OR 2ND FLR;  Service: Cardiovascular;  Laterality: N/A;    IRRIGATION OF MEDIASTINUM N/A 7/15/2022    Procedure: IRRIGATION, MEDIASTINUM;  Surgeon: Yg Kaufman MD;  Location: [facility] OR 2ND FLR;  Service: Cardiovascular;  Laterality: N/A;    LYSIS OF ADHESIONS  7/13/2022    Procedure: LYSIS, ADHESIONS;  Surgeon: Yg Kaufman MD;  Location: [facility] OR 2ND FLR;  Service: Cardiovascular;;    NONINVASIVE CARDIAC ELECTROPHYSIOLOGY STUDY N/A 10/18/2019    Procedure: CARDIAC ELECTROPHYSIOLOGY STUDY, NONINVASIVE;  Surgeon: Raz Wagner MD;  Location: [facility] EP LAB;  Service: Cardiology;  Laterality: N/A;  VT, DFTs, MDT CRTD in situ, LVAD, juarez, MB, 3098    REPLACEMENT OF IMPLANTABLE CARDIOVERTER-DEFIBRILLATOR (ICD) GENERATOR N/A 3/9/2020    Procedure: REPLACEMENT, ICD GENERATOR;  Surgeon: Harry Yun MD;  Location: [facility] EP LAB;  Service: Cardiology;  Laterality: N/A;  VT, ICD Gen Change and Lead Revision, MDT, MAC, DM,3 Prep    REPLACEMENT OF LEFT VENTRICULAR ASSIST DEVICE (LVAD)  7/13/2022    Procedure: REPLACEMENT, LVAD;  Surgeon: Yg Kaufman MD;  Location: [facility] OR  2ND FLR;  Service: Cardiovascular;;    REPLACEMENT OF PUMP N/A 7/13/2022    Procedure: REPLACEMENT, PUMP;  Surgeon: Yg Kaufman MD;  [facility];  Service: Cardiovascular;  Laterality: N/A;  LVAD pump exchange  EXPLANATION OF HEATMATE 2  IMPLANTATION OF HEARTMATE 3  IMPLANTATION OF 8MM CHIMNEY GRAFT TO RFA  INITIATION OF ECMO  TEMPORARY CLOSURE OF CHEST    REVISION OF IMPLANTABLE CARDIOVERTER-DEFIBRILLATOR (ICD) ELECTRODE LEAD PLACEMENT N/A 3/9/2020    Procedure: REVISION, INSERTION, ELECTRODE LEAD, ICD;  Surgeon: Harry Yun MD;  Location: [facility] EP LAB;  Service: Cardiology;  Laterality: N/A;  VT, ICD Gen Change and Lead Revision, MDT, MAC, DM,3 Prep    STERNAL WOUND CLOSURE N/A 7/14/2022    Procedure: CLOSURE, WOUND, STERNUM;  Surgeon: Yg Kaufman MD;  [facility];  Service: Cardiovascular;  Laterality: N/A;  temporary closure  evacuation of hematoma    STERNAL WOUND CLOSURE N/A 7/15/2022    Procedure: CLOSURE, WOUND, STERNUM;  Surgeon: Yg Kaufman MD;  [address]  Service: Cardiovascular;  Laterality: N/A;    TRACHEOSTOMY N/A 8/4/2022    Procedure: CREATION, TRACHEOSTOMY;  Surgeon: Germain Holt MD;  [address]  Service: General;  Laterality: N/A;    TREATMENT OF CARDIAC ARRHYTHMIA  10/18/2019    Procedure: Cardioversion or Defibrillation;  Surgeon: Raz Wagner MD;  Location: [facility] EP LAB;  Service: Cardiology;;       Prior Intubation HX: intubated 7/13-7/28, re-intubated emergently 7/29, trach placed 8/4    Modified Barium Swallow: none prior     Prior diet: currently NPO with NG tube in place       Subjective     Pt awake and alert   Pt seen in conjunction with PT and OT for optimal postioning for speaking valve and PO trials   Pain/Comfort:  · Pain Rating 1: 0/10  · Pain Rating Post-Intervention 1: 0/10    Respiratory Status: trach collar     Objective:     Oral Musculature Evaluation  · Oral Musculature: WFL, general  weakness  · Dentition: present and adequate  · Oral Labial Strength and Mobility: WFL  · Lingual Strength and Mobility: WFL  · Volitional Cough: strong  · Volitional Swallow: prompt  · Voice Prior to PO Intake: strong and clear    Notable to mention pt with significantly saturated trach tries and neck guaze with bright red blood, RN aware and reports team also aware.     Per discussion with RT and RN tracheal suction continues to be clear without any blood laden tracheal secretions and all parties in agreement with speaking valve trials at this time.     Passy Kenroy Speaking Valve  Trach size/type: shiley 6.0 cuffed with trach cuff fully deflated   Able to phonate around trach: yes, strained   Vocal quality with digital finger occlusion: unable to achieve 2/2 duck bill nature of inner cannula   O2 sats at rest: unable to obtain 2/2 LVADs are intermittently pulsatile   O2 sats with PMSV in place: unable to obtain 2/2 LVADs are intermittently pulsatile   Vocal quality with valve in place: strong and clear, intermittent hoarseness   Back pressure upon removal: none appreciated   Minutes PMSV worn: 25 + consecutive minutes   Education topics addressed: cleaning valve, one-way technology, anatomy of trach, precautions with cuffed trach, removal of valve prior to sleeping      Bedside Swallow Eval:   Consistencies Assessed:  · Thin liquids ice x4, single sips from open cup x3  · Puree full tsp x3     Oral Phase:   · WFL   ·  large boluses warranting SLP max cues to restrict to small single sips     Pharyngeal Phase:   · no overt clinical signs/symptoms of aspiration  · multiple spontaneous swallows   · Throat clear appreciated x1 after very larger bolus     Compensatory Strategies  · None    Treatment:   Education provided to Pt re: SLP role in acute care setting, overall impressions and therapeutic goals.'    SLP discussed wearing speaking valve for short increment and if neck bleeding worsens to discontinued wearing  valve at all. SLP discussed will move forward with more objective swallow assessment to help determine least restrictive diet. SLP discussed with medical team and orders obtained. Whiteboard updated.      Assessment:     Tim Richards is a 56 y.o. male with an SLP diagnosis of Dysphagia, S/P Trach and One Way Speaking Valve Training.    Goals:   Multidisciplinary Problems     SLP Goals        Problem: SLP    Goal Priority Disciplines Outcome   SLP Goal     SLP    Description: Speech Language Pathology Goals  Goals expected to be met by 8/6    Pt will participate in ongoing assessment of swallow function to help determine least restrictive diet                        Plan:     · Patient to be seen:  4 x/week   · Plan of Care expires:     · Plan of Care reviewed with:      · SLP Follow-Up:  Yes       Discharge recommendations:  rehabilitation facility   Barriers to Discharge:  None per ST     Time Tracking:     SLP Treatment Date:   08/11/22  Speech Start Time:  1123  Speech Stop Time:  1147     Speech Total Time (min):  24 min    Billable Minutes: Eval 8 , Eval Swallow and Oral Function 8 and Self Care/Home Management Training 8    08/11/2022

## 2022-08-11 NOTE — ASSESSMENT & PLAN NOTE
Leukocytosis with associated fever up to 100.9 F status post creation of tracheostomy on 8/4/22. Blood cultures this admission have been unrevealing. Only notable pathogen isolated was Klebsiella aerogenes from sputum for which patient has been treated with cefepime. Metronidazole was added for anaerobic coverage. 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 Old DLES inspected at bedside today and found to be dry with no drainage. Newer left sided DLES bandage seen dry, clean, and intact with no drainage reported by nursing. DLES cultures performed on 7/25 showed no growth. CT c/a/p 7/29 with no evidence of infection. 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 First dose 80 mg now tapered down to 60 mg. No evidence of new infectious process. Patient denies any alarm symptoms today. Remains afebrile off all pressors. Leukocytosis is likely multifactorial in setting of recent surgeries, high dose steroids, thyrotoxicosis. Cefepime / flagyl changed to miguel no 8/9, WBC now downtrending. No infectious etiology identified.     Recommendations  - continue meropenem for 7 days total  - If patient will be on glucocorticoid dose equivalent to ?20 mg of prednisone daily for one month or longer then would recommend starting PJP prophylaxis with PO TMP//80 mg daily or renally dosed by pharmacy     Will sign off. Call back if any pending cultures turn positive.

## 2022-08-11 NOTE — PT/OT/SLP PROGRESS
Occupational Therapy   Co-Treatment with PT/SLP    Name: Tim Richards  MRN: [id number]  Admitting Diagnosis:  Left ventricular assist device (LVAD) complication  7 Days Post-Op    Recommendations:     Discharge Recommendations: rehabilitation facility  Discharge Equipment Recommendations:   (TBD)  Barriers to discharge:  None    Assessment:     Tmi Richards is a 56 y.o. male with a medical diagnosis of Left ventricular assist device (LVAD) complication.  He presents with good motivation and participation. Pt tolerated session well and is slowly progressing towards OT goals. Pt with good affect this date, motivated by SLP PMSV and PO trials. Performance deficits affecting function are weakness, impaired endurance, impaired self care skills, impaired functional mobility, gait instability, impaired balance, decreased coordination, decreased upper extremity function, decreased lower extremity function, decreased safety awareness. Pt would benefit from skilled OT services in order to maximize independence with ADLs and facilitate safe discharge. Because of patient's significant decline from PLOF, patient would benefit from Inpatient Rehab to maximize return to PLOF.     Rehab Prognosis:  Good; patient would benefit from acute skilled OT services to address these deficits and reach maximum level of function.       Plan:     Patient to be seen 5 x/week to address the above listed problems via self-care/home management, therapeutic activities, therapeutic exercises, neuromuscular re-education  · Plan of Care Expires: 08/25/22  · Plan of Care Reviewed with: patient    Subjective     Pain/Comfort:  Pain Rating 1: 0/10  Pain Rating Post-Intervention 1: 0/10    Objective:     Communicated with: RN and PT prior to session.  Patient found HOB elevated with telemetry, pulse ox (continuous), Tracheostomy, oxygen, NG tube, LVAD, arterial line, blood pressure cuff, pressure relief boots upon OT entry to room.    General  Precautions: Standard, fall, LVAD, sternal   Orthopedic Precautions:N/A   Braces: N/A  Respiratory Status: trach collar     Occupational Performance:     Bed Mobility:    · Patient completed Scooting anterior towards EOB with stand by assistance  · Patient completed Supine to Sit with contact guard assistance  · Patient completed Sit to Supine with minimum assistance     Functional Mobility/Transfers:  · Patient completed Sit <> Stand Transfer with moderate/maximal assistance and of 2 persons  with  hand-held assist    · x2 trials from EOB with bed in low position with max (A) x2 persons  · x2 trials from EOB with bed in higher position with mod (A) x2 persons  · Facilitation for erect posture with B knee/foot blocking  · Functional Mobility: unable  · Pt sat EOB x15 minutes with SBA    Activities of Daily Living:  · Feeding: stand by assistance to grasp cup and spoon for PO trials with SLP present  · Lower Body Dressing: maximal assistance to doff B  socks       AMPAC 6 Click ADL: 12    Treatment & Education:  -Therapist provided facilitation and instruction of proper body mechanics, energy conservation, and fall prevention strategies during tasks listed above.  -Pt educated on role of OT, POC and goals for therapy  -Pt educated on importance of OOB activities with staff member assistance   -Pt verbalized understanding. Pt expressed no further concerns/questions  -Whiteboard updated  -Co-tx with PT performed due to need for education and assistance from two skilled therapy disciplines at pt's current functional level in the ICU setting  -RN requesting PMSV remains on pt while RN present    Patient left HOB elevated with all lines intact, call button in reachEducation:   and RN present    GOALS:   Multidisciplinary Problems     Occupational Therapy Goals        Problem: Occupational Therapy    Goal Priority Disciplines Outcome Interventions   Occupational Therapy Goal     OT, PT/OT Ongoing, Progressing     Description: Goals to be met by: 8/9/22     Patient will increase functional independence with ADLs by performing:    UE Dressing with Stand-by Assistance.  LE Dressing with Stand-by Assistance.  Grooming while standing at sink with Stand-by Assistance.  Toileting from toilet with Stand-by Assistance for hygiene and clothing management.   Toilet transfer to toilet with Stand-by Assistance.               Multidisciplinary Problems (Resolved)        Problem: Occupational Therapy    Goal Priority Disciplines Outcome Interventions   Occupational Therapy Goal   (Resolved)     OT, PT/OT Met           Problem: Occupational Therapy    Goal Priority Disciplines Outcome Interventions   Occupational Therapy Goal   (Resolved)     OT, PT/OT Met                    Time Tracking:     OT Date of Treatment: 08/11/22  OT Start Time: 1126  OT Stop Time: 1156  OT Total Time (min): 30 min    Billable Minutes:Self Care/Home Management 15  Therapeutic Activity 15    OT/CARY: OT          8/11/2022

## 2022-08-11 NOTE — PT/OT/SLP PROGRESS
Physical Therapy    Co-Treatment with OT/SLP    Patient Name:  Tim Richards   MRN:  [id number]    Recommendations:     Discharge Recommendations:  rehabilitation facility   Discharge Equipment Recommendations:  (TBD by next level of care)   Barriers to discharge: Inaccessible home and Decreased caregiver support    Assessment:     Tim Richards is a 56 y.o. male admitted with a medical diagnosis of Left ventricular assist device (LVAD) complication.  He presents with the following impairments/functional limitations:  weakness, impaired endurance, impaired self care skills, impaired functional mobility, gait instability, impaired balance, decreased lower extremity function, decreased upper extremity function, decreased coordination, decreased safety awareness.   Pt tolerated PMSV and PO trials with speech well and demonstrated increased motivation. Pt's STS improved from higher level surface, but patient continues to have difficulty coming to full stand and maintaining upright with full knee and hip extension.    Pt would benefit from intensive inpatient rehabilitation for: Dynamic/static standing/sitting balance through skilled balance training, strengthening with the use of skilled therapeutic exercises interventions, mobility and safety training to ensure safe discharge home through skilled patient and caregiver education home management training. Pt highly motivated to return to independent PLOF and can tolerate 3+hours of therapy. Pt continues to benefit from a collaborative PT/OT/SLP program to improve quality of life and focus on recovery of impairments.      Rehab Prognosis: Good; patient would benefit from acute skilled PT services to address these deficits and reach maximum level of function.    Recent Surgery: Procedure(s) (LRB):  CREATION, TRACHEOSTOMY (N/A)  INSERTION, CENTRAL VENOUS ACCESS DEVICE 7 Days Post-Op    Plan:     During this hospitalization, patient to be seen 5 x/week to address the  identified rehab impairments via gait training, therapeutic activities, therapeutic exercises, neuromuscular re-education and progress toward the following goals:    · Plan of Care Expires:  08/20/22    Subjective     Chief Complaint: none verbalized  Patient/Family Comments/goals: pt wanted to keep the PMSV on all day  Pain/Comfort:  · Pain Rating 1: 0/10  · Pain Rating Post-Intervention 1: 0/10      Objective:     Communicated with RN prior to session.  Patient found HOB elevated with blood pressure cuff, telemetry, pressure relief boots, arterial line, NG tube, LVAD, oxygen, peripheral IV, pulse ox (continuous), Tracheostomy upon PT entry to room.     General Precautions: Standard, fall, LVAD, sternal   Orthopedic Precautions:N/A   Braces: N/A  Respiratory Status: trach collar     Functional Mobility:  · Bed Mobility:     · Scooting: contact guard assistance  · Supine to Sit: contact guard assistance  · Sit to Supine: minimum assistance  · Transfers:     · Trial 1: Sit to Stand from bed at regular height:  maximal assistance and of 2 persons with no AD and hand-held assist; B knees and feet blocked hip extension manually facilitated. Pt unable to come to full upright  · Trial 2: Sit to Stand from bed at regular height:  maximal assistance and of 2 persons with no AD and hand-held assist; B knees and feet blocked hip extension manually facilitated. Pt unable to come to full upright  · Trial 3: Sit to Stand from bed at elevated height:  moderate assistance and of 2 persons with no AD and hand-held assist; B knees and feet blocked for safety, pt able to achieve full upright for ~20 seconds and hip extension facilitation  · Trial 4: Sit to Stand from bed at elevated height:  moderate assistance and of 2 persons with no AD and hand-held assist; B knees and feet blocked for safety, pt able to achieve full upright for ~10 seconds and hip extension facilitation  · Gait: N/T this date  · Balance:   · Static Sitting:  SBA  · Dynamic Sitting: SBA-CGA  · Static Standing: modAx2 to maxAx2  · Dynamic Standing: N/T    LVAD:   · Pt remained on wall power throughout session.  · No alarms sounded during session    AM-PAC 6 CLICK MOBILITY  Turning over in bed (including adjusting bedclothes, sheets and blankets)?: 3  Sitting down on and standing up from a chair with arms (e.g., wheelchair, bedside commode, etc.): 2  Moving from lying on back to sitting on the side of the bed?: 3  Moving to and from a bed to a chair (including a wheelchair)?: 1  Need to walk in hospital room?: 1  Climbing 3-5 steps with a railing?: 1  Basic Mobility Total Score: 11       Therapeutic Activities and Exercises:  Patient also educated and instructed on therapeutic exercises. Therapeutic exercises included the following: Long Arc Quads, Seated marches (Single leg), Bilateral Heel raises, Bilateral Toe Raises. Pt performed 1x10 on BLEs.  Patient educated on role of therapy, goals of session, and benefits of mobilizing.   Discussed PT plan of care during hospitalization.   Patient educated on calling for assistance.   Patient educated on how their diagnosis impacts their mobility within PT scope of practice.   Communication board up to date.  All questions answered within PT scope of practice.      Co-tx with OT performed due to need for education and assistance from two skilled therapy disciplines at pt's current functional level in the ICU setting    Patient left HOB elevated with all lines intact, call button in reach and RN present.    GOALS:   Multidisciplinary Problems     Physical Therapy Goals        Problem: Physical Therapy    Goal Priority Disciplines Outcome Goal Variances Interventions   Physical Therapy Goal     PT, PT/OT Ongoing, Progressing     Description: Goals to be met by: 22    Patient will increase functional independence with mobility by performin. Supine to sit with MInimal Assistance -not met  2. Sit to stand transfer with  Contact Guard Assistance -not met  3. Gait  x 150 feet with Contact Guard Assistance with approved assistive device -not met  4. Ascend/descend 8 stair with right Handrails Contact Guard Assistance -not met  5. Sitting at edge of bed x15 minutes with Contact Guard Assistance to improve tolerance to activities. -not met  6. Lower extremity exercise program x15 reps with assistance as needed -not met               Multidisciplinary Problems (Resolved)        Problem: Physical Therapy    Goal Priority Disciplines Outcome Goal Variances Interventions   Physical Therapy Goal   (Resolved)     PT, PT/OT Met     Description: The patient is near his functional baseline, he ambulated within the room with no AD and independence. Unable to do stair training, assess gait endurance due to COVID restrictions. He is safe to return home with no therapy needs. He is in agreement with PT discharge, he states he is confident he can ascend/descend his stairs.           Problem: Physical Therapy    Goal Priority Disciplines Outcome Goal Variances Interventions   Physical Therapy Goal   (Resolved)     PT, PT/OT Met                     Time Tracking:     PT Received On: 08/11/22  PT Start Time: 1125     PT Stop Time: 1157  PT Total Time (min): 32 min     Billable Minutes: Therapeutic Activity 10 and Therapeutic Exercise 15       PT/PTA: PT     PTA Visit Number: 0     08/11/2022

## 2022-08-11 NOTE — PLAN OF CARE
"      SICU PLAN OF CARE NOTE    SHIFT EVENTS:  VSS / No acute events this shift. Trach site oozing now controlled (see previous shift notes). Per HF Fellow, continue to hold Hep titration until further orders received. SLP consulted today. Patient tolerating PMV without issues; O2 Sats % on 8L 40% Trach Collar. Plans for Modified Barium Swallow tomorrow.  POC reviewed with patient and spouse; questions and concerns addressed. See flow sheets for full assessment details. Will continue to monitor patient closely.    Dx: Left ventricular assist device (LVAD) complication    Vital Signs: BP (!) 84/0 (BP Location: Right arm, Patient Position: Lying)   Pulse 72   Temp 97.7 °F (36.5 °C) (Oral)   Resp (!) 26   Ht 6' 1" (1.854 m)   Wt 88.5 kg (195 lb)   SpO2 100%   BMI 25.73 kg/m²      VAD HM3 Settings  Speed 6300 (LSL 5900)  Flows 5.7  PI 1.4 - 2.1   Power 5.3 - 5.5    SKIN NOTE:  Skin: No pressure injuries noted.  Right Groin Wound care per Wound Care Rn this afternoon  RLQ previous VAD site dressing changed per orders; Site healing.   Current VAD site WNL.    Skin precautions maintained including:  Sacrum and heels with foam dressing in place for pressure protection. Frequent weight shift encouraged / assistance provided Q2 hr to prevent breakdown. Bed plugged in and mattress inflated; [employer] bed utilized. Adhesive use limited. Heels elevated off bed. Pressure points protected and positioning supports utilized.  Skin-to-device areas padded. Skin-to-skin areas padded    "

## 2022-08-11 NOTE — PROGRESS NOTES
[facility]  Infectious Disease  Progress Note    Patient Name: Tim Richards  MRN: [id number]  Admission Date: 6/27/2022  Length of Stay: 45 days  Attending Physician: Isaiah Santizo MD  Primary Care Provider: Deyanira Booth MD    Isolation Status: No active isolations  Assessment/Plan:      Leukocytosis  Leukocytosis with associated fever up to 100.9 F status post creation of tracheostomy on 8/4/22. Blood cultures this admission have been unrevealing. Only notable pathogen isolated was Klebsiella aerogenes from sputum for which patient has been treated with cefepime. Metronidazole was added for anaerobic coverage. 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 Old DLES inspected at bedside today and found to be dry with no drainage. Newer left sided DLES bandage seen dry, clean, and intact with no drainage reported by nursing. DLES cultures performed on 7/25 showed no growth. CT c/a/p 7/29 with no evidence of infection. 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 First dose 80 mg now tapered down to 60 mg. No evidence of new infectious process. Patient denies any alarm symptoms today. Remains afebrile off all pressors. Leukocytosis is likely multifactorial in setting of recent surgeries, high dose steroids, thyrotoxicosis. Cefepime / flagyl changed to miguel no 8/9, WBC now downtrending. No infectious etiology identified.     Recommendations  - continue meropenem for 7 days total  - If patient will be on glucocorticoid dose equivalent to ?20 mg of prednisone daily for one month or longer then would recommend starting PJP prophylaxis with PO TMP//80 mg daily or renally dosed by pharmacy     Will sign off. Call back if any pending cultures turn positive.            Anticipated Disposition: TBD    Thank you for your consult. I will sign off. Please contact us if you have any additional  "questions.    Nelly Mullins DO  Critical Care Infectious Disease    Time: 35 minutes   50% of time spent on face-to-face counseling and coordination of care. Counseling included review of test results, diagnosis, and treatment plan with patient and/or family.        Subjective:     Principal Problem:Left ventricular assist device (LVAD) complication    HPI: A 56-year-old man with HFrEF-10%, s/p VAD HM2 (March 2018), ICD, VT on amiodarone who developed malaise, anorexia, SOB, dark urine, and soft stools 3-4 days prior to admission. He was evaluated in [facility] ED at which time he tested positive for Covid-19 infection. He was admitted for further management and started on Remdesivir treatment protocol. Since admission, he feels significantly improved with bowel movements returning to normal and the shortness of breath subsiding.     Infectious Diseases consulted for "covid infection, acute. Patient with LVAD"    ID reconsulted 7/21 for "sepsis and consideration for fungemia". Since pt was last seen by ID, patient underwent AV repair, redo sternotomy, explant of old lvad HM2 with implantation of HM3, and placed on VA-ECMO, takeback 7/14 for mediastinal washout/hematoma, and washout, sternal closure, creation of moises-pericardium (gerotex membrane) and wound vac placement on 7/15. Decannulated ECMO on 7/19 with subsequent fever and hypotension. Pt was placed on broad spectrum abx. Blcx obtained from 7/20 ngtd. S/p bronch on 7/20 - cx with normal respiratory sachin.     ID reconsulted 8/5 for "On cefepime and flagyl day 10 for klebsiella. Spiking fever. Has LVAD. WBC trending up (also on steroid though). Tip culture from [facility] in process."      Interval History: no events overnight, cx remain negative, now has speaking valve on trach, denies any new complaints, WBC downtrending    Review of Systems   Constitutional:  Negative for activity change, appetite change, chills and fever.   HENT:  Negative for congestion, dental problem, " ear pain and rhinorrhea.    Eyes:  Negative for pain and discharge.   Respiratory:  Negative for cough and shortness of breath.    Cardiovascular:  Negative for chest pain and leg swelling.   Gastrointestinal:  Negative for abdominal distention, abdominal pain, constipation, diarrhea, nausea and vomiting.   Genitourinary:  Negative for difficulty urinating and dysuria.   Musculoskeletal:  Negative for arthralgias, back pain and joint swelling.   Skin:  Negative for rash and wound.   Neurological:  Negative for dizziness, light-headedness and headaches.   Psychiatric/Behavioral:  Negative for behavioral problems and confusion.    Objective:     Vital Signs (Most Recent):  Temp: 97.6 °F (36.4 °C) (08/11/22 1200)  Pulse: 70 (08/11/22 1500)  Resp: (!) 21 (08/11/22 1500)  BP: (!) 88/0 (08/11/22 1200)  SpO2: 100 % (08/11/22 1200)   Vital Signs (24h Range):  Temp:  [97.6 °F (36.4 °C)-98.2 °F (36.8 °C)] 97.6 °F (36.4 °C)  Pulse:  [70-83] 70  Resp:  [16-38] 21  SpO2:  [98 %-100 %] 100 %  BP: (74-88)/(0) 88/0  Arterial Line BP: (74-92)/(56-81) 91/79     Weight: 88.5 kg (195 lb)  Body mass index is 25.73 kg/m².    Estimated Creatinine Clearance: 59 mL/min (A) (based on SCr of 1.6 mg/dL (H)).    Physical Exam  Constitutional:       General: He is not in acute distress.     Appearance: Normal appearance. He is well-developed. He is not ill-appearing or diaphoretic.   HENT:      Head: Normocephalic and atraumatic.      Right Ear: External ear normal.      Left Ear: External ear normal.      Nose: Nose normal.   Eyes:      General: No scleral icterus.        Right eye: No discharge.         Left eye: No discharge.      Extraocular Movements: Extraocular movements intact.      Conjunctiva/sclera: Conjunctivae normal.   Cardiovascular:      Rate and Rhythm: Normal rate and regular rhythm.      Pulses: Normal pulses.      Heart sounds: Normal heart sounds. No murmur heard.    No friction rub. No gallop.   Pulmonary:      Effort:  Pulmonary effort is normal. No respiratory distress.      Breath sounds: Normal breath sounds. No stridor.      Comments: Dried blood surrounding trach site  Abdominal:      General: Abdomen is flat. Bowel sounds are normal. There is no distension.      Palpations: Abdomen is soft.      Tenderness: There is no abdominal tenderness.      Comments: Old right sided DLES dry on inspection  Left sided DLES bandage clean, dry, and intact    Skin:     General: Skin is warm and dry.      Coloration: Skin is not jaundiced or pale.      Findings: No erythema.      Comments: Midline chest incision healing   R groin wound vac   Neurological:      General: No focal deficit present.      Mental Status: He is alert and oriented to person, place, and time. Mental status is at baseline.   Psychiatric:         Mood and Affect: Mood normal.         Behavior: Behavior normal.         Thought Content: Thought content normal.         Judgment: Judgment normal.       Significant Labs: CBC:   Recent Labs   Lab 08/10/22  2004 08/11/22  0305 08/11/22  1050   WBC 28.63* 27.02* 30.24*   HGB 8.9* 8.2* 8.5*   HCT 31.2* 28.2* 30.1*    280 271     CMP:   Recent Labs   Lab 08/11/22  0305 08/11/22  0404 08/11/22  1050    141 143   K 5.4* 5.1 4.9   * 113* 114*   CO2 20* 20* 20*    108 115*   BUN 65* 64* 60*   CREATININE 1.7* 1.7* 1.6*   CALCIUM 9.5 9.2 9.4   PROT 6.2 6.1 6.2   ALBUMIN 2.3* 2.2* 2.3*   BILITOT 3.5* 3.4* 3.6*   ALKPHOS 161* 157* 171*   AST 48* 54* 44*   ALT 53* 53* 56*   ANIONGAP 9 8 9     Microbiology Results (last 7 days)       Procedure Component Value Units Date/Time    Blood culture [[id number]] Collected: 08/09/22 1102    Order Status: Completed Specimen: Blood from Peripheral, Antecubital, Left Updated: 08/11/22 1212     Blood Culture, Routine No Growth to date      No Growth to date      No Growth to date    Blood culture [[id number]] Collected: 08/09/22 1045    Order Status: Completed Specimen: Blood  from Peripheral, Hand, Right Updated: 08/11/22 1212     Blood Culture, Routine No Growth to date      No Growth to date      No Growth to date    Culture, Respiratory with Gram Stain [[id number]] Collected: 08/09/22 1026    Order Status: Completed Specimen: Respiratory from Endotracheal Aspirate Updated: 08/11/22 1051     Respiratory Culture Normal respiratory sachin      No S aureus or Pseudomonas isolated.     Gram Stain (Respiratory) <10 epithelial cells per low power field.     Gram Stain (Respiratory) Rare WBC's     Gram Stain (Respiratory) No organisms seen    Culture, Anaerobe [[id number]] Collected: 08/09/22 1347    Order Status: Completed Specimen: Incision site from Abdomen Updated: 08/11/22 0925     Anaerobic Culture Culture in progress    Narrative:      DL site    Aerobic culture [[id number]] Collected: 08/09/22 1347    Order Status: Completed Specimen: Incision site from Abdomen Updated: 08/10/22 0535     Aerobic Bacterial Culture No growth    Narrative:      DL site.    IV catheter culture [[id number]] Collected: 08/04/22 1525    Order Status: Completed Specimen: Catheter Tip, Intrajugular Updated: 08/06/22 0950     Aerobic Culture - Cath tip No growth            Significant Imaging: I have reviewed all pertinent imaging results/findings within the past 24 hours.

## 2022-08-11 NOTE — NURSING
MD Norberto at bedside orders to put heparin gtt back to 12 units/kg/hr if nurse notices more bleeding at trach site.

## 2022-08-11 NOTE — NURSING
MD Norberto notified pt K 5.9; scheduled aspart 5 units and detemir 6 units given. Orders to repeat K check with AM labs.

## 2022-08-11 NOTE — CONSULTS
GENERAL SURGERY    Per primary/nursing, clot around trach this AM.  Surgicel placed in area.  Asked to evaluate.  Took down dressing, removed sutures, partially removed trach and there was some oozing from skin edge, but suspect most of the bleeding is deeper.  With patient's permission, placed suture over this area.  Bleeding seemed to have stopped for the time being.      Plan:  Last transfusion was a month ago and hgb fairly stable since, unless bleed is significant enough to cause respiratory symptoms or hemodynamic/transfusion concerns, would run hep gtt at primary team's discretion    Tony Wheeler MD  General Surgery, PGY-5  [phone]

## 2022-08-11 NOTE — PLAN OF CARE
Pt mouthing words and communicates appropriately by writing on communication board. Trach collar all day, 35%, 10L, tolerated well. General surgery consulted for bleeding around trach, still bloody this afternoon but improving. Heparin gtt restarted. Tube feeds at 60 (goal), tolerated well. Up to side of bed with therapy. LVAD speed 6300, no alarms or issues. Dressing change per orders by RN. Wound care at bedside to assess right groin wound site. No issues with pain. /shift. Wife at bedside, plan of care reviewed.

## 2022-08-11 NOTE — PROGRESS NOTES
"[facility]  Endocrinology  Progress Note    Admit Date: 6/27/2022     56 year-old  male with NICM with LVAD, s/p ICD for VT on amiodarone and mexiletine and AIT followed by hypothyroidism initially admitted 6/27/2022 with COVID respiratory failure complicated with LVAD pump thrombosis that was refractory to medical therapy. Underwent LVAD pump exchange. Post-op course complicated by worsening cardiogenic shock/RV failure requiring VA-ECMO. Improved clinically underwent successful decannulation. Continued episodes of VT with ICD shock 7/21 and ongoing anti-arrhythmic mediation adjustments. TFTs on 7/27 significant for recurrent hyperthyroidism with undetectable TSH and elevated fT4.    - Patient re-intubated 07/29/2022    - Endocrinology consulted for recurrent AIT    Regarding AIT:    - Last seen outpatient by Dr. 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 He required a prolonged course of prednisone and methimazole, then subsequently developed hypothyroidism in May 2020. LT4 was adjusted based on serial TFT measurements. Most recently levothyroxine dose has been 112 mcg     - He self-decreased his amiodarone dose to 200 mg daily about 6 months ago. T4 was high on 7/13, but he was continued on levothyroxine 112 mcg    Lab Results   Component Value Date    TSH <0.010 (L) 07/27/2022    TSH 0.111 (L) 07/13/2022    TSH 4.079 (H) 03/17/2022    FREET4 2.51 (H) 08/07/2022    FREET4 2.95 (H) 08/06/2022    FREET4 2.18 (H) 08/05/2022      Latest Reference Range & Units 08/07/22 03:42 08/08/22 03:10 08/09/22 03:29 08/10/22 03:33   Free T4 0.71 - 1.51 ng/dL 2.51 (H) 2.43 (H) 2.23 (H) 2.12 (H)       Interval HPI:   Sugars well controlled, fT4 improved to 1.67    BP (!) 84/0 (BP Location: Right arm, Patient Position: Lying)   Pulse 80   Temp 97.8 °F (36.6 °C) (Oral)   Resp (!) 27   Ht 6' 1" (1.854 m)   Wt " 88.5 kg (195 lb)   SpO2 100%   BMI 25.73 kg/m²     Labs Reviewed and Include    Recent Labs   Lab 08/11/22  0404      CALCIUM 9.2   ALBUMIN 2.2*   PROT 6.1      K 5.1   CO2 20*   *   BUN 64*   CREATININE 1.7*   ALKPHOS 157*   ALT 53*   AST 54*   BILITOT 3.4*     Lab Results   Component Value Date    WBC 27.02 (H) 08/11/2022    HGB 8.2 (L) 08/11/2022    HCT 28.2 (L) 08/11/2022    MCV 98 08/11/2022     08/11/2022     Recent Labs   Lab 08/10/22  0333 08/11/22  0305   FREET4 2.12* 1.67*     Lab Results   Component Value Date    HGBA1C 5.4 04/26/2021       Nutritional status:   Body mass index is 25.73 kg/m².  Lab Results   Component Value Date    ALBUMIN 2.2 (L) 08/11/2022    ALBUMIN 2.3 (L) 08/11/2022    ALBUMIN 2.3 (L) 08/10/2022     Lab Results   Component Value Date    PREALBUMIN 28 08/05/2022    PREALBUMIN 13 (L) 07/29/2022    PREALBUMIN 11 (L) 07/17/2022       Estimated Creatinine Clearance: 55.5 mL/min (A) (based on SCr of 1.7 mg/dL (H)).    Accu-Checks  Recent Labs     08/09/22  2055 08/10/22  0038 08/10/22  0333 08/10/22  0811 08/10/22  1203 08/10/22  1622 08/10/22  2004 08/11/22  0017 08/11/22  0310 08/11/22  0855   POCTGLUCOSE 136* 117* 107 107 133* 148* 137* 131* 100 129*       Current Medications and/or Treatments Impacting Glycemic Control  Immunotherapy:    Immunosuppressants       None          Steroids:   Hormones (From admission, onward)                Start     Stop Route Frequency Ordered    08/11/22 0900  predniSONE tablet 40 mg         -- PER NG TUBE Daily 08/10/22 0931    08/02/22 1931  melatonin tablet 6 mg         -- OG Nightly PRN 08/02/22 1832          Pressors:    Autonomic Drugs (From admission, onward)                Start     Stop Route Frequency Ordered    07/29/22 0011  EPINEPHrine (ADRENALIN) 1 mg/mL injection        Note to Pharmacy: Created by cabinet override    07/29 1214   07/29/22 0011          Hyperglycemia/Diabetes Medications:   Antihyperglycemics  (From admission, onward)                Start     Stop Route Frequency Ordered    08/11/22 0000  insulin aspart U-100 pen 4 Units         -- SubQ 6 times per day 08/10/22 2055    08/03/22 2100  insulin detemir U-100 pen 6 Units         -- SubQ Nightly 08/03/22 1900    07/30/22 1023  insulin aspart U-100 pen 0-5 Units         -- SubQ Every 4 hours PRN 07/30/22 0925            ASSESSMENT and PLAN    Amiodarone-induced hyperthyroidism  Key History and Diagnostic Findings  - History of AIT type 2 (TRAb and TSI negative; Thyroid US unremarkable with low vascularity)  - Weaned off methimazole and prednisone by May 2020, then developed hypothyroidism, Levothyroxine started and dose titrated per TFTs. Prior to current admission he was on Levothyroxine 112 mcg daily  - Labs consistent with hypothyroidism until current admission, initially on 7/13, with low TSH and elevated fT4. Repeat TFTs on 7/27 with worsening hyperthyroidism. He continued to receive LT4 112 mcg through 7/28. He remains on amiodarone for episodes of Ventricular Tachycardia  - Suspect current hyperthyroidism is likely due to AIT-2, however continued exogenous LT4 certainly contribute to current presentation   - Free T4 continues to improve, today at 1.67    Lab Results   Component Value Date    TSH <0.010 (L) 07/27/2022    TSH 0.111 (L) 07/13/2022    TSH 4.079 (H) 03/17/2022    FREET4 1.67 (H) 08/11/2022    FREET4 2.12 (H) 08/10/2022    FREET4 2.23 (H) 08/09/2022     Plan  - Strongly suspect AIT (type 2); continuing empiric treatment for both types  - Heparin can cause a false elevation in free T4 as it displaces free T4 from TBG; will follow-up direct dialysis result which is still pending  - Will decrease Methimazole to 20 mg daily  - Continue Prednisone to 40 mg daily  - Continue checking daily free T4    Hyperglycemia  Key History and Diagnostic Findings  - Hyperglycemia noted once starting TPN in addition to steroids  - No prior history of diabetes;  most recent A1c of 5.4 on 04/26/2021  - Blood sugars at goal for now    Plan  - Continue levemir 6 units QHS  - Decrease Aspart to 4 units every 4 hour with low correction while on Tube Feeds  - Please notify endocrine if any change in tube feeds as this will change insulin requirements.  - Please hold insulin if tube feeds are held for some reason  - Please ensure that accuchecks are being documented every 4 hours    Chronic combined systolic and diastolic heart failure  Managed per cardiology          Poncho Guajardo MD  Endocrinology  [facility]

## 2022-08-11 NOTE — PROGRESS NOTES
08/11/2022  Fitz Christianson    Current provider:  Isaiah Santizo MD    Device interrogation:  TXP LVAD INTERROGATIONS 8/11/2022 8/11/2022 8/11/2022 8/11/2022 8/11/2022 8/11/2022 8/11/2022   Type HeartMate3 HeartMate3 HeartMate3 HeartMate3 HeartMate3 HeartMate3 HeartMate3   Flow 5.7 5.7 5.7 5.7 5.6 5.7 5.7   Speed 6300 6300 6300 6300 6300 6300 6300   PI 1.4 1.4 1.4 1.6 1.6 1.5 1.4   Power ([place]) 2.3 5.5 5.4 5.4 5.5 5.4 5.3   LSL 5900 5900 5900 5900 5900 5900 5900   Low Flow Alarm - - - - - - -   Pulsatility No Pulse No Pulse No Pulse No Pulse No Pulse No Pulse No Pulse          Rounded on Tim Richards to ensure all mechanical assist device settings (IABP or VAD) were appropriate and all parameters were within limits.  I was able to ensure all back up equipment was present, the staff had no issues, and the Perfusion Department daily rounding was complete.      For implantable VADs: Interrogation of Ventricular assist device was performed with analysis of device parameters and review of device function. I have personally reviewed the interrogation findings and agree with findings as stated.     In emergency, the nursing units have been notified to contact the perfusion department either by:  Calling [phone] from 630am to 4pm Mon thru Fri, utilizing the On-Call Finder functionality of Epic and searching for Perfusion, or by contacting the hospital  from 4pm to 630am and on weekends and asking to speak with the perfusionist on call.    11:41 AM

## 2022-08-11 NOTE — SUBJECTIVE & OBJECTIVE
"Interval HPI:   Sugars well controlled, fT4 improved to 1.67    BP (!) 84/0 (BP Location: Right arm, Patient Position: Lying)   Pulse 80   Temp 97.8 °F (36.6 °C) (Oral)   Resp (!) 27   Ht 6' 1" (1.854 m)   Wt 88.5 kg (195 lb)   SpO2 100%   BMI 25.73 kg/m²     Labs Reviewed and Include    Recent Labs   Lab 08/11/22  0404      CALCIUM 9.2   ALBUMIN 2.2*   PROT 6.1      K 5.1   CO2 20*   *   BUN 64*   CREATININE 1.7*   ALKPHOS 157*   ALT 53*   AST 54*   BILITOT 3.4*     Lab Results   Component Value Date    WBC 27.02 (H) 08/11/2022    HGB 8.2 (L) 08/11/2022    HCT 28.2 (L) 08/11/2022    MCV 98 08/11/2022     08/11/2022     Recent Labs   Lab 08/10/22  0333 08/11/22  0305   FREET4 2.12* 1.67*     Lab Results   Component Value Date    HGBA1C 5.4 04/26/2021       Nutritional status:   Body mass index is 25.73 kg/m².  Lab Results   Component Value Date    ALBUMIN 2.2 (L) 08/11/2022    ALBUMIN 2.3 (L) 08/11/2022    ALBUMIN 2.3 (L) 08/10/2022     Lab Results   Component Value Date    PREALBUMIN 28 08/05/2022    PREALBUMIN 13 (L) 07/29/2022    PREALBUMIN 11 (L) 07/17/2022       Estimated Creatinine Clearance: 55.5 mL/min (A) (based on SCr of 1.7 mg/dL (H)).    Accu-Checks  Recent Labs     08/09/22  2055 08/10/22  0038 08/10/22  0333 08/10/22  0811 08/10/22  1203 08/10/22  1622 08/10/22  2004 08/11/22  0017 08/11/22  0310 08/11/22  0855   POCTGLUCOSE 136* 117* 107 107 133* 148* 137* 131* 100 129*       Current Medications and/or Treatments Impacting Glycemic Control  Immunotherapy:    Immunosuppressants       None          Steroids:   Hormones (From admission, onward)                Start     Stop Route Frequency Ordered    08/11/22 0900  predniSONE tablet 40 mg         -- PER NG TUBE Daily 08/10/22 0931    08/02/22 1931  melatonin tablet 6 mg         -- OG Nightly PRN 08/02/22 1832          Pressors:    Autonomic Drugs (From admission, onward)                Start     Stop Route Frequency " Ordered    07/29/22 0011  EPINEPHrine (ADRENALIN) 1 mg/mL injection        Note to Pharmacy: Created by cabinet override    07/29 1214   07/29/22 0011          Hyperglycemia/Diabetes Medications:   Antihyperglycemics (From admission, onward)                Start     Stop Route Frequency Ordered    08/11/22 0000  insulin aspart U-100 pen 4 Units         -- SubQ 6 times per day 08/10/22 2055    08/03/22 2100  insulin detemir U-100 pen 6 Units         -- SubQ Nightly 08/03/22 1900    07/30/22 1023  insulin aspart U-100 pen 0-5 Units         -- SubQ Every 4 hours PRN 07/30/22 0952

## 2022-08-11 NOTE — ASSESSMENT & PLAN NOTE
Key History and Diagnostic Findings  - Hyperglycemia noted once starting TPN in addition to steroids  - No prior history of diabetes; most recent A1c of 5.4 on 04/26/2021  - Blood sugars at goal for now    Plan  - Continue levemir 6 units QHS  - Decrease Aspart to 4 units every 4 hour with low correction while on Tube Feeds  - Please notify endocrine if any change in tube feeds as this will change insulin requirements.  - Please hold insulin if tube feeds are held for some reason  - Please ensure that accuchecks are being documented every 4 hours

## 2022-08-12 LAB
ALBUMIN SERPL BCP-MCNC: 2.2 G/DL (ref 3.5–5.2)
ALP SERPL-CCNC: 183 U/L (ref 55–135)
ALP SERPL-CCNC: 194 U/L (ref 55–135)
ALP SERPL-CCNC: 205 U/L (ref 55–135)
ALT SERPL W/O P-5'-P-CCNC: 55 U/L (ref 10–44)
ALT SERPL W/O P-5'-P-CCNC: 58 U/L (ref 10–44)
ALT SERPL W/O P-5'-P-CCNC: 61 U/L (ref 10–44)
ANION GAP SERPL CALC-SCNC: 7 MMOL/L (ref 8–16)
ANION GAP SERPL CALC-SCNC: 7 MMOL/L (ref 8–16)
ANION GAP SERPL CALC-SCNC: 8 MMOL/L (ref 8–16)
ANISOCYTOSIS BLD QL SMEAR: SLIGHT
APTT BLDCRRT: 24.7 SEC (ref 21–32)
APTT BLDCRRT: 29.9 SEC (ref 21–32)
APTT BLDCRRT: 30.2 SEC (ref 21–32)
AST SERPL-CCNC: 36 U/L (ref 10–40)
AST SERPL-CCNC: 50 U/L (ref 10–40)
AST SERPL-CCNC: 51 U/L (ref 10–40)
BACTERIA SPEC AEROBE CULT: NO GROWTH
BASO STIPL BLD QL SMEAR: ABNORMAL
BASO STIPL BLD QL SMEAR: ABNORMAL
BASOPHILS # BLD AUTO: 0.06 K/UL (ref 0–0.2)
BASOPHILS # BLD AUTO: ABNORMAL K/UL (ref 0–0.2)
BASOPHILS NFR BLD: 0 % (ref 0–1.9)
BASOPHILS NFR BLD: 0 % (ref 0–1.9)
BASOPHILS NFR BLD: 0.2 % (ref 0–1.9)
BILIRUB SERPL-MCNC: 3.1 MG/DL (ref 0.1–1)
BILIRUB SERPL-MCNC: 3.2 MG/DL (ref 0.1–1)
BILIRUB SERPL-MCNC: 3.2 MG/DL (ref 0.1–1)
BNP SERPL-MCNC: 230 PG/ML (ref 0–99)
BUN SERPL-MCNC: 53 MG/DL (ref 6–20)
BUN SERPL-MCNC: 54 MG/DL (ref 6–20)
BUN SERPL-MCNC: 56 MG/DL (ref 6–20)
CALCIUM SERPL-MCNC: 9.3 MG/DL (ref 8.7–10.5)
CALCIUM SERPL-MCNC: 9.7 MG/DL (ref 8.7–10.5)
CALCIUM SERPL-MCNC: 9.9 MG/DL (ref 8.7–10.5)
CHLORIDE SERPL-SCNC: 116 MMOL/L (ref 95–110)
CO2 SERPL-SCNC: 16 MMOL/L (ref 23–29)
CO2 SERPL-SCNC: 18 MMOL/L (ref 23–29)
CO2 SERPL-SCNC: 19 MMOL/L (ref 23–29)
CREAT SERPL-MCNC: 1.4 MG/DL (ref 0.5–1.4)
CREAT SERPL-MCNC: 1.5 MG/DL (ref 0.5–1.4)
CREAT SERPL-MCNC: 1.5 MG/DL (ref 0.5–1.4)
CRP SERPL-MCNC: 2.7 MG/L (ref 0–8.2)
DACRYOCYTES BLD QL SMEAR: ABNORMAL
DIFFERENTIAL METHOD: ABNORMAL
DOHLE BOD BLD QL SMEAR: PRESENT
EOSINOPHIL # BLD AUTO: 0 K/UL (ref 0–0.5)
EOSINOPHIL # BLD AUTO: ABNORMAL K/UL (ref 0–0.5)
EOSINOPHIL NFR BLD: 0 % (ref 0–8)
EOSINOPHIL NFR BLD: 0 % (ref 0–8)
EOSINOPHIL NFR BLD: 0.1 % (ref 0–8)
ERYTHROCYTE [DISTWIDTH] IN BLOOD BY AUTOMATED COUNT: 18.7 % (ref 11.5–14.5)
ERYTHROCYTE [DISTWIDTH] IN BLOOD BY AUTOMATED COUNT: 18.9 % (ref 11.5–14.5)
ERYTHROCYTE [DISTWIDTH] IN BLOOD BY AUTOMATED COUNT: 19 % (ref 11.5–14.5)
EST. GFR  (NO RACE VARIABLE): 54.6 ML/MIN/1.73 M^2
EST. GFR  (NO RACE VARIABLE): 54.6 ML/MIN/1.73 M^2
EST. GFR  (NO RACE VARIABLE): 59.4 ML/MIN/1.73 M^2
FIBRINOGEN PPP-MCNC: 465 MG/DL (ref 182–400)
GLUCOSE SERPL-MCNC: 103 MG/DL (ref 70–110)
GLUCOSE SERPL-MCNC: 143 MG/DL (ref 70–110)
GLUCOSE SERPL-MCNC: 159 MG/DL (ref 70–110)
HCT VFR BLD AUTO: 28.7 % (ref 40–54)
HCT VFR BLD AUTO: 30 % (ref 40–54)
HCT VFR BLD AUTO: 30.6 % (ref 40–54)
HGB BLD-MCNC: 8.3 G/DL (ref 14–18)
HGB BLD-MCNC: 8.4 G/DL (ref 14–18)
HGB BLD-MCNC: 8.5 G/DL (ref 14–18)
HYPOCHROMIA BLD QL SMEAR: ABNORMAL
IMM GRANULOCYTES # BLD AUTO: 1.42 K/UL (ref 0–0.04)
IMM GRANULOCYTES # BLD AUTO: ABNORMAL K/UL (ref 0–0.04)
IMM GRANULOCYTES # BLD AUTO: ABNORMAL K/UL (ref 0–0.04)
IMM GRANULOCYTES NFR BLD AUTO: 4.8 % (ref 0–0.5)
IMM GRANULOCYTES NFR BLD AUTO: ABNORMAL % (ref 0–0.5)
IMM GRANULOCYTES NFR BLD AUTO: ABNORMAL % (ref 0–0.5)
INR PPP: 1 (ref 0.8–1.2)
INR PPP: 1 (ref 0.8–1.2)
LDH SERPL L TO P-CCNC: 205 U/L (ref 110–260)
LYMPHOCYTES # BLD AUTO: 0.6 K/UL (ref 1–4.8)
LYMPHOCYTES # BLD AUTO: ABNORMAL K/UL (ref 1–4.8)
LYMPHOCYTES NFR BLD: 1.9 % (ref 18–48)
LYMPHOCYTES NFR BLD: 2 % (ref 18–48)
LYMPHOCYTES NFR BLD: 4 % (ref 18–48)
MAGNESIUM SERPL-MCNC: 2.9 MG/DL (ref 1.6–2.6)
MAGNESIUM SERPL-MCNC: 2.9 MG/DL (ref 1.6–2.6)
MAGNESIUM SERPL-MCNC: 3 MG/DL (ref 1.6–2.6)
MCH RBC QN AUTO: 27.5 PG (ref 27–31)
MCH RBC QN AUTO: 28.1 PG (ref 27–31)
MCH RBC QN AUTO: 29.1 PG (ref 27–31)
MCHC RBC AUTO-ENTMCNC: 27.1 G/DL (ref 32–36)
MCHC RBC AUTO-ENTMCNC: 28.3 G/DL (ref 32–36)
MCHC RBC AUTO-ENTMCNC: 29.3 G/DL (ref 32–36)
MCV RBC AUTO: 101 FL (ref 82–98)
MCV RBC AUTO: 103 FL (ref 82–98)
MCV RBC AUTO: 96 FL (ref 82–98)
METAMYELOCYTES NFR BLD MANUAL: 2 %
MONOCYTES # BLD AUTO: 1.1 K/UL (ref 0.3–1)
MONOCYTES # BLD AUTO: ABNORMAL K/UL (ref 0.3–1)
MONOCYTES NFR BLD: 2 % (ref 4–15)
MONOCYTES NFR BLD: 3.8 % (ref 4–15)
MONOCYTES NFR BLD: 4 % (ref 4–15)
MYELOCYTES NFR BLD MANUAL: 1 %
MYELOCYTES NFR BLD MANUAL: 1 %
NEUTROPHILS # BLD AUTO: 26.7 K/UL (ref 1.8–7.7)
NEUTROPHILS NFR BLD: 84 % (ref 38–73)
NEUTROPHILS NFR BLD: 89.2 % (ref 38–73)
NEUTROPHILS NFR BLD: 92 % (ref 38–73)
NEUTS BAND NFR BLD MANUAL: 3 %
NEUTS BAND NFR BLD MANUAL: 5 %
NRBC BLD-RTO: 0 /100 WBC
OVALOCYTES BLD QL SMEAR: ABNORMAL
OVALOCYTES BLD QL SMEAR: ABNORMAL
PHOSPHATE SERPL-MCNC: 2.1 MG/DL (ref 2.7–4.5)
PHOSPHATE SERPL-MCNC: 2.7 MG/DL (ref 2.7–4.5)
PHOSPHATE SERPL-MCNC: 3 MG/DL (ref 2.7–4.5)
PLATELET # BLD AUTO: 217 K/UL (ref 150–450)
PLATELET # BLD AUTO: 228 K/UL (ref 150–450)
PLATELET # BLD AUTO: 229 K/UL (ref 150–450)
PLATELET BLD QL SMEAR: ABNORMAL
PMV BLD AUTO: 11.8 FL (ref 9.2–12.9)
PMV BLD AUTO: 12.1 FL (ref 9.2–12.9)
PMV BLD AUTO: 12.2 FL (ref 9.2–12.9)
POCT GLUCOSE: 117 MG/DL (ref 70–110)
POCT GLUCOSE: 138 MG/DL (ref 70–110)
POIKILOCYTOSIS BLD QL SMEAR: SLIGHT
POIKILOCYTOSIS BLD QL SMEAR: SLIGHT
POLYCHROMASIA BLD QL SMEAR: ABNORMAL
POTASSIUM SERPL-SCNC: 5 MMOL/L (ref 3.5–5.1)
POTASSIUM SERPL-SCNC: 5.2 MMOL/L (ref 3.5–5.1)
POTASSIUM SERPL-SCNC: 6.2 MMOL/L (ref 3.5–5.1)
PREALB SERPL-MCNC: 32 MG/DL (ref 20–43)
PROT SERPL-MCNC: 6.1 G/DL (ref 6–8.4)
PROT SERPL-MCNC: 6.4 G/DL (ref 6–8.4)
PROT SERPL-MCNC: 6.6 G/DL (ref 6–8.4)
PROTHROMBIN TIME: 10.5 SEC (ref 9–12.5)
PROTHROMBIN TIME: 10.8 SEC (ref 9–12.5)
RBC # BLD AUTO: 2.92 M/UL (ref 4.6–6.2)
RBC # BLD AUTO: 2.99 M/UL (ref 4.6–6.2)
RBC # BLD AUTO: 3.02 M/UL (ref 4.6–6.2)
SCHISTOCYTES BLD QL SMEAR: ABNORMAL
SCHISTOCYTES BLD QL SMEAR: PRESENT
SODIUM SERPL-SCNC: 140 MMOL/L (ref 136–145)
SODIUM SERPL-SCNC: 141 MMOL/L (ref 136–145)
SODIUM SERPL-SCNC: 142 MMOL/L (ref 136–145)
SPHEROCYTES BLD QL SMEAR: ABNORMAL
T4 FREE SERPL-MCNC: 1.62 NG/DL (ref 0.71–1.51)
TOXIC GRANULES BLD QL SMEAR: PRESENT
TOXIC GRANULES BLD QL SMEAR: PRESENT
WBC # BLD AUTO: 26.79 K/UL (ref 3.9–12.7)
WBC # BLD AUTO: 29.27 K/UL (ref 3.9–12.7)
WBC # BLD AUTO: 29.89 K/UL (ref 3.9–12.7)
WBC TOXIC VACUOLES BLD QL SMEAR: PRESENT

## 2022-08-12 PROCEDURE — 94003 VENT MGMT INPAT SUBQ DAY: CPT

## 2022-08-12 PROCEDURE — 85610 PROTHROMBIN TIME: CPT | Mod: 91 | Performed by: THORACIC SURGERY (CARDIOTHORACIC VASCULAR SURGERY)

## 2022-08-12 PROCEDURE — 25000003 PHARM REV CODE 250: Performed by: STUDENT IN AN ORGANIZED HEALTH CARE EDUCATION/TRAINING PROGRAM

## 2022-08-12 PROCEDURE — 20000000 HC ICU ROOM

## 2022-08-12 PROCEDURE — 25000003 PHARM REV CODE 250: Performed by: INTERNAL MEDICINE

## 2022-08-12 PROCEDURE — 93750 INTERROGATION VAD IN PERSON: CPT | Mod: ,,, | Performed by: INTERNAL MEDICINE

## 2022-08-12 PROCEDURE — 86140 C-REACTIVE PROTEIN: CPT | Performed by: STUDENT IN AN ORGANIZED HEALTH CARE EDUCATION/TRAINING PROGRAM

## 2022-08-12 PROCEDURE — 27200966 HC CLOSED SUCTION SYSTEM

## 2022-08-12 PROCEDURE — 84439 ASSAY OF FREE THYROXINE: CPT | Performed by: STUDENT IN AN ORGANIZED HEALTH CARE EDUCATION/TRAINING PROGRAM

## 2022-08-12 PROCEDURE — 25000242 PHARM REV CODE 250 ALT 637 W/ HCPCS: Performed by: THORACIC SURGERY (CARDIOTHORACIC VASCULAR SURGERY)

## 2022-08-12 PROCEDURE — 85025 COMPLETE CBC W/AUTO DIFF WBC: CPT | Performed by: THORACIC SURGERY (CARDIOTHORACIC VASCULAR SURGERY)

## 2022-08-12 PROCEDURE — 99900026 HC AIRWAY MAINTENANCE (STAT)

## 2022-08-12 PROCEDURE — 93750 PR INTERROGATE VENT ASSIST DEV, IN PERSON, W PHYSICIAN ANALYSIS: ICD-10-PCS | Mod: 59,,, | Performed by: INTERNAL MEDICINE

## 2022-08-12 PROCEDURE — 25000242 PHARM REV CODE 250 ALT 637 W/ HCPCS

## 2022-08-12 PROCEDURE — 94640 AIRWAY INHALATION TREATMENT: CPT

## 2022-08-12 PROCEDURE — 99900035 HC TECH TIME PER 15 MIN (STAT)

## 2022-08-12 PROCEDURE — 25000003 PHARM REV CODE 250: Performed by: PHYSICIAN ASSISTANT

## 2022-08-12 PROCEDURE — 27000221 HC OXYGEN, UP TO 24 HOURS

## 2022-08-12 PROCEDURE — 97535 SELF CARE MNGMENT TRAINING: CPT

## 2022-08-12 PROCEDURE — 85027 COMPLETE CBC AUTOMATED: CPT | Mod: 91 | Performed by: THORACIC SURGERY (CARDIOTHORACIC VASCULAR SURGERY)

## 2022-08-12 PROCEDURE — 63600175 PHARM REV CODE 636 W HCPCS: Performed by: INTERNAL MEDICINE

## 2022-08-12 PROCEDURE — 25000003 PHARM REV CODE 250

## 2022-08-12 PROCEDURE — 85384 FIBRINOGEN ACTIVITY: CPT | Performed by: INTERNAL MEDICINE

## 2022-08-12 PROCEDURE — 85027 COMPLETE CBC AUTOMATED: CPT | Performed by: STUDENT IN AN ORGANIZED HEALTH CARE EDUCATION/TRAINING PROGRAM

## 2022-08-12 PROCEDURE — 85730 THROMBOPLASTIN TIME PARTIAL: CPT | Performed by: THORACIC SURGERY (CARDIOTHORACIC VASCULAR SURGERY)

## 2022-08-12 PROCEDURE — 27000248 HC VAD-ADDITIONAL DAY

## 2022-08-12 PROCEDURE — 83880 ASSAY OF NATRIURETIC PEPTIDE: CPT | Performed by: STUDENT IN AN ORGANIZED HEALTH CARE EDUCATION/TRAINING PROGRAM

## 2022-08-12 PROCEDURE — 80048 BASIC METABOLIC PNL TOTAL CA: CPT | Mod: XB | Performed by: STUDENT IN AN ORGANIZED HEALTH CARE EDUCATION/TRAINING PROGRAM

## 2022-08-12 PROCEDURE — 83615 LACTATE (LD) (LDH) ENZYME: CPT | Performed by: INTERNAL MEDICINE

## 2022-08-12 PROCEDURE — 85730 THROMBOPLASTIN TIME PARTIAL: CPT | Mod: 91 | Performed by: STUDENT IN AN ORGANIZED HEALTH CARE EDUCATION/TRAINING PROGRAM

## 2022-08-12 PROCEDURE — 99233 PR SUBSEQUENT HOSPITAL CARE,LEVL III: ICD-10-PCS | Mod: ,,, | Performed by: INTERNAL MEDICINE

## 2022-08-12 PROCEDURE — 83735 ASSAY OF MAGNESIUM: CPT | Performed by: THORACIC SURGERY (CARDIOTHORACIC VASCULAR SURGERY)

## 2022-08-12 PROCEDURE — 84100 ASSAY OF PHOSPHORUS: CPT | Performed by: THORACIC SURGERY (CARDIOTHORACIC VASCULAR SURGERY)

## 2022-08-12 PROCEDURE — 85007 BL SMEAR W/DIFF WBC COUNT: CPT | Performed by: STUDENT IN AN ORGANIZED HEALTH CARE EDUCATION/TRAINING PROGRAM

## 2022-08-12 PROCEDURE — 25000003 PHARM REV CODE 250: Performed by: THORACIC SURGERY (CARDIOTHORACIC VASCULAR SURGERY)

## 2022-08-12 PROCEDURE — 63600175 PHARM REV CODE 636 W HCPCS: Performed by: STUDENT IN AN ORGANIZED HEALTH CARE EDUCATION/TRAINING PROGRAM

## 2022-08-12 PROCEDURE — 80053 COMPREHEN METABOLIC PANEL: CPT | Performed by: THORACIC SURGERY (CARDIOTHORACIC VASCULAR SURGERY)

## 2022-08-12 PROCEDURE — 85730 THROMBOPLASTIN TIME PARTIAL: CPT | Mod: 91 | Performed by: INTERNAL MEDICINE

## 2022-08-12 PROCEDURE — 94761 N-INVAS EAR/PLS OXIMETRY MLT: CPT

## 2022-08-12 PROCEDURE — 92611 MOTION FLUOROSCOPY/SWALLOW: CPT

## 2022-08-12 PROCEDURE — 85007 BL SMEAR W/DIFF WBC COUNT: CPT | Mod: 91 | Performed by: THORACIC SURGERY (CARDIOTHORACIC VASCULAR SURGERY)

## 2022-08-12 PROCEDURE — 99233 SBSQ HOSP IP/OBS HIGH 50: CPT | Mod: ,,, | Performed by: INTERNAL MEDICINE

## 2022-08-12 RX ORDER — CALCIUM GLUCONATE 20 MG/ML
1 INJECTION, SOLUTION INTRAVENOUS ONCE
Status: COMPLETED | OUTPATIENT
Start: 2022-08-12 | End: 2022-08-12

## 2022-08-12 RX ORDER — MIRTAZAPINE 15 MG/1
15 TABLET, FILM COATED ORAL NIGHTLY
Status: DISCONTINUED | OUTPATIENT
Start: 2022-08-12 | End: 2022-08-29

## 2022-08-12 RX ORDER — CALCIUM GLUCONATE 20 MG/ML
1 INJECTION, SOLUTION INTRAVENOUS EVERY 10 MIN PRN
Status: DISCONTINUED | OUTPATIENT
Start: 2022-08-12 | End: 2022-08-19

## 2022-08-12 RX ADMIN — ACETYLCYSTEINE 4 ML: 100 SOLUTION ORAL; RESPIRATORY (INHALATION) at 01:08

## 2022-08-12 RX ADMIN — SODIUM ZIRCONIUM CYCLOSILICATE 10 G: 10 POWDER, FOR SUSPENSION ORAL at 09:08

## 2022-08-12 RX ADMIN — ASPIRIN 81 MG CHEWABLE TABLET 81 MG: 81 TABLET CHEWABLE at 08:08

## 2022-08-12 RX ADMIN — INSULIN ASPART 4 UNITS: 100 INJECTION, SOLUTION INTRAVENOUS; SUBCUTANEOUS at 07:08

## 2022-08-12 RX ADMIN — CHOLESTYRAMINE 4 G: 4 POWDER, FOR SUSPENSION ORAL at 06:08

## 2022-08-12 RX ADMIN — CHOLESTYRAMINE 4 G: 4 POWDER, FOR SUSPENSION ORAL at 08:08

## 2022-08-12 RX ADMIN — INSULIN ASPART 4 UNITS: 100 INJECTION, SOLUTION INTRAVENOUS; SUBCUTANEOUS at 08:08

## 2022-08-12 RX ADMIN — PREDNISONE 40 MG: 20 TABLET ORAL at 08:08

## 2022-08-12 RX ADMIN — ACETYLCYSTEINE 4 ML: 100 SOLUTION ORAL; RESPIRATORY (INHALATION) at 12:08

## 2022-08-12 RX ADMIN — MEXILETINE HYDROCHLORIDE 400 MG: 200 CAPSULE ORAL at 02:08

## 2022-08-12 RX ADMIN — INSULIN DETEMIR 6 UNITS: 100 INJECTION, SOLUTION SUBCUTANEOUS at 09:08

## 2022-08-12 RX ADMIN — LEVALBUTEROL HYDROCHLORIDE 0.63 MG: 0.63 SOLUTION RESPIRATORY (INHALATION) at 12:08

## 2022-08-12 RX ADMIN — LEVALBUTEROL HYDROCHLORIDE 0.63 MG: 0.63 SOLUTION RESPIRATORY (INHALATION) at 01:08

## 2022-08-12 RX ADMIN — PANTOPRAZOLE SODIUM 40 MG: 40 GRANULE, DELAYED RELEASE ORAL at 08:08

## 2022-08-12 RX ADMIN — MEROPENEM 2 G: 1 INJECTION INTRAVENOUS at 02:08

## 2022-08-12 RX ADMIN — LEVALBUTEROL HYDROCHLORIDE 0.63 MG: 0.63 SOLUTION RESPIRATORY (INHALATION) at 07:08

## 2022-08-12 RX ADMIN — MEXILETINE HYDROCHLORIDE 400 MG: 200 CAPSULE ORAL at 09:08

## 2022-08-12 RX ADMIN — CHOLESTYRAMINE 4 G: 4 POWDER, FOR SUSPENSION ORAL at 12:08

## 2022-08-12 RX ADMIN — MEROPENEM 2 G: 1 INJECTION INTRAVENOUS at 05:08

## 2022-08-12 RX ADMIN — MIRTAZAPINE 15 MG: 15 TABLET, FILM COATED ORAL at 09:08

## 2022-08-12 RX ADMIN — ACETYLCYSTEINE 4 ML: 100 SOLUTION ORAL; RESPIRATORY (INHALATION) at 07:08

## 2022-08-12 RX ADMIN — MEXILETINE HYDROCHLORIDE 400 MG: 200 CAPSULE ORAL at 05:08

## 2022-08-12 RX ADMIN — MEROPENEM 2 G: 1 INJECTION INTRAVENOUS at 09:08

## 2022-08-12 RX ADMIN — CHOLESTYRAMINE 4 G: 4 POWDER, FOR SUSPENSION ORAL at 09:08

## 2022-08-12 RX ADMIN — CALCIUM GLUCONATE 1 G: 20 INJECTION, SOLUTION INTRAVENOUS at 10:08

## 2022-08-12 RX ADMIN — METHIMAZOLE 20 MG: 10 TABLET ORAL at 08:08

## 2022-08-12 RX ADMIN — INSULIN ASPART 4 UNITS: 100 INJECTION, SOLUTION INTRAVENOUS; SUBCUTANEOUS at 12:08

## 2022-08-12 RX ADMIN — AMIODARONE HYDROCHLORIDE 400 MG: 200 TABLET ORAL at 08:08

## 2022-08-12 NOTE — NURSING
SICU PLAN OF CARE NOTE    Dx: Left ventricular assist device (LVAD) complication    Shift Events: VSS, no acute events during shift. Plan for today is a modified barium swallow study.    Gtts: Heparin     Neuro: AAO x4, Follows Commands and Moves All Extremities    Cardiac: NSR 70s-80s    Respiratory: trach collar 8LPM 30%    GI: Tube Feeds @ 60cc/hr    : Voids Spontaneously 925 cc/shift    VAD  Flows: 5.6-5.7, Speed: 6300, PI: 1.3-1.5, LSL 4900. Pt is non pulsatile Doppler MAP: 78,80,     Labs/Accuchecks: q8 labs, accuchecks q4    Skin: No new skin breakdown noted, pt turned and repositioned throughout shift. Wound care consulted and managing care of all wounds. Immerse bed in use, bed plugged in, wheels locked, call light in reach. Green booties on, foams in place, pillows in use.

## 2022-08-12 NOTE — PROGRESS NOTES
[facility]  Heart Failure  Progress Note     Hospital Length of Stay: 46  Interval History:  - Bleeding from trach site has resolved. Tolerating tube feeding.   - Plan for barium swallow study today.   - Denies any complaints.   - No n/v. No abdominal discomfort.     Vitals:  Temp:  [97.5 °F (36.4 °C)-97.8 °F (36.6 °C)] 97.5 °F (36.4 °C)  Pulse:  [70-90] 72  Resp:  [16-35] 19  SpO2:  [99 %-100 %] 99 %  BP: (80)/(0) 80/0  Arterial Line BP: ()/(59-97) 92/81    Intake/Output    Intake/Output Summary (Last 24 hours) at 8/12/2022 1122  Last data filed at 8/12/2022 0900  Gross per 24 hour   Intake 1767.07 ml   Output 2050 ml   Net -282.93 ml     Physical Exam  Gen: Trach in place with blood soaked gauze, alert and awake.   HEENT: Pupils equal and reactive to light. Icterus +  Cardio: Regular rate, VAD hum obstructing heart sounds  Resp: No additional sound heard.   Abd: Soft, non-tender, non-distended  Skin: Warm,  trace peripheral edema  Neuro: No gross abnormalities.   Psych: Normal mood and affect.      Labs:  Recent Labs   Lab 08/11/22  1050 08/11/22  2105 08/12/22  0325   WBC 30.24* 28.70* 26.79*   HGB 8.5* 8.7* 8.4*   HCT 30.1* 29.7* 28.7*    194 229     Recent Labs   Lab 08/11/22  1050 08/11/22  2309 08/12/22  0325    141 142   K 4.9 5.6* 5.0   * 115* 116*   CO2 20* 19* 19*   BUN 60* 57* 56*   CREATININE 1.6* 1.6* 1.5*   * 121* 103   CALCIUM 9.4 9.6 9.7   MG 3.1* 3.0* 3.0*   PHOS 3.1 2.8 2.1*       Micro:    Current Meds:   acetylcysteine 100 mg/ml (10%)  4 mL Nebulization Q6H    amiodarone  400 mg Oral Daily    aspirin  81 mg Per OG tube Daily    cholestyramine  1 packet Per NG tube QID    guaiFENesin 100 mg/5 ml  300 mg Per NG tube Q6H    insulin aspart U-100  4 Units Subcutaneous 6 times per day    insulin detemir U-100  6 Units Subcutaneous QHS    levalbuterol  0.63 mg Nebulization Q6H    magnesium oxide  400 mg Oral BID    meropenem (MERREM) IVPB  2  g Intravenous Q8H    methIMAzole  20 mg Per NG tube Daily    mexiletine  400 mg Oral Q8H    mirtazapine  15 mg Oral QHS    ondansetron  4 mg Intravenous Once    pantoprazole  40 mg Per NG tube Daily    predniSONE  40 mg Per NG tube Daily       Continuous Infusions:   dextrose 10 % in water (D10W)      heparin (porcine) in D5W 17 Units/kg/hr (08/12/22 0800)     Assessment and Plan:  Cardiogenic Shock  -Previous HM2, underwent pump exchange to HM3 on 7/13/22 complicated by worsening CS/RV failure requiring VA ECMO now decannulated  Currently trach ed. Chest tube removal, bronch, and old driveline removed 7/22.  -Re-intubated on 7/29/22 due to hypercapnic resp failure. Trach placed on 8/4.   -Doing well on Trach collar.   -Off diuretics currently. Creatinine has trended down to baseline. Might restart PO diuretics tomorrow.      Trach site bleeding: Resolved.   - Continue minimal dosing heparin.   - Surgical team placed a suture on 8/11.       LVAD:  - No significant events.   - Functioning well.     Fever: No fever in 7  Days:  Leucocytosis trending down albeit slowly.   ID team on board.   Changed to meropenem . Plan to continue meropenem for 7 days.   No growth in cultures yet.      History of ventricular tachycardia/Episode of A flutter 2:1 block:  Patient experienced an episode of VT on 6/30 and experienced an ICD shock thought to be related to hypokalemia (K of 3.1 on 6/30)  Aggressively replete K, keep K>4 and Mg>2  Continue mexiletine PO.  Amio gtt transitioned to PO.     COVID-19 virus infection:  -Previously hypoxic then recovered  -ID was consulted, recommended Remdesivir, course completed     Elevated serum lactate dehydrogenase (LDH):  S/p HM3 exchange for HM2 pump thrombosis  Continue to trend LDH.      Amiodarone induced hypothyrodism initially, now hyperthyroidism:  -Endocrine team on board.  -On prednisone and methimazole.   - Prednisone 40 mg.   -Continue to monitor free T4. Trending down.   -  Medications has been changes as per Endocrinology team.            Scot Card MD, FACP  Cardiovascular Disease Fellow PGY4  [facility]

## 2022-08-12 NOTE — PT/OT/SLP PROGRESS
Occupational Therapy      Patient Name:  Tim Richards   MRN:  [id number]    Patient not seen today secondary to pt CAROL ANN for MBSS upon OT attempt at 0945. Will follow-up as scheduled.    8/12/2022

## 2022-08-12 NOTE — ADDENDUM NOTE
Addendum  created 08/12/22 174 by Juanito Dimas MD    Clinical Note Signed, Intraprocedure Blocks edited, SmartForm saved

## 2022-08-12 NOTE — PROGRESS NOTES
08/12/2022  Shirley Porras    Current provider:  Isaiah Santizo MD    Device interrogation:  TXP LVAD INTERROGATIONS 8/12/2022 8/12/2022 8/12/2022 8/12/2022 8/12/2022 8/12/2022 8/12/2022   Type - - - - - - -   Flow 5.7 5.7 5.7 5.7 5.6 5.7 5.7   Speed 6300 6300 6300 6300 6300 6300 6300   PI 1.8 2.2 2.3 2.0 1.5 1.4 1.3   Power (Jackson) 3.4 5.4 5.4 5.5 5.3 5.4 5.3   LSL 5900 5900 5900 5900 5900 5900 5900   Low Flow Alarm - - - - - - -   Pulsatility - - - - - - -          Rounded on Tim Richards to ensure all mechanical assist device settings (IABP or VAD) were appropriate and all parameters were within limits.  I was able to ensure all back up equipment was present, the staff had no issues, and the Perfusion Department daily rounding was complete.      For implantable VADs: Interrogation of Ventricular assist device was performed with analysis of device parameters and review of device function. I have personally reviewed the interrogation findings and agree with findings as stated.     In emergency, the nursing units have been notified to contact the perfusion department either by:  Calling [phone] from 630am to 4pm Mon thru Fri, utilizing the On-Call Finder functionality of Epic and searching for Perfusion, or by contacting the hospital  from 4pm to 630am and on weekends and asking to speak with the perfusionist on call.    8:13 AM

## 2022-08-12 NOTE — PROCEDURES
Modified Barium Swallow    Patient Name:  Tim Richards   MRN:  [id number]      Recommendations:     Recommendations:                General Recommendations:  Dysphagia therapy  Diet recommendations:  NPO, NPO  Nectar thickened liquids for pleasure, sparingly.   Aspiration Precautions: Strict aspiration precautions   General Precautions: Standard, fall, LVAD, sternal  Communication strategies:  One way speaking valve    Referral     Reason for Referral  Patient was referred for a Modified Barium Swallow Study to assess the efficiency of his/her swallow function, rule out aspiration and make recommendations regarding safe dietary consistencies, effective compensatory strategies, and safe eating environment.     Diagnosis: Left ventricular assist device (LVAD) complication       History:     Past Medical History:   Diagnosis Date    Anticoagulant long-term use     CHF (congestive heart failure)     Class 1 obesity due to excess calories with serious comorbidity and body mass index (BMI) of 31.0 to 31.9 in adult     Dilated cardiomyopathy 1/10/2018    Disorder of kidney and ureter     CKD    Encounter for blood transfusion     Gout     HTN (hypertension)     Hx of psychiatric care     ICD (implantable cardioverter-defibrillator) infection 7/1/2020    Psychiatric problem     Thyroid disease     Ventricular tachycardia (paroxysmal)        Objective:     Current Respiratory Status: 08/12/22    Alert: yes    Cooperative: yes    Follows Directions: yes    Visualization  · Patient was seen in the lateral view  · NG tube observed in place  · Tracheostomy tube visualized in place/ One Way Speaking Valve present    Oral Peripheral Examination  · Oral Musculature: WFL, general weakness  · Dentition: present and adequate  · Oral Labial Strength and Mobility: WFL  · Lingual Strength and Mobility: WFL  · Volitional Cough: strong  · Volitional Swallow: prompt  · Voice Prior to PO Intake: strong and  clear    Consistencies Assessed  · Thin via tsp cup and straw over 50mL  · Nectar thick via straw x2  · Puree x1    Oral Preparation/Oral Phase  · WFL- Pt with adequate bolus acceptance, containment, control and timely A-P transfer across consistencies     Pharyngeal Phase   Pt essentially timely swallow initiation for age  Pt with reduced BOT retraction  Pt with reduced hyolaryngeal elevation and near absent excursion patterns  Pt with incomplete epiglottic inversion patterns  Patient with laryngeal penetration to the vocal folds consistently with thin liquids via tsp cup and straw sip trials.  Material also noted to coat superior surface of vocal folds though ejected with throat clear.   No penetration or aspiration of puree consistencies however patient with severe vallecular stasis requiring 8+ swallows to clear ~75% of single bolus presented.   Penetration appreciated with nectar thickened liquids however likely penetration of previous thin liquids and   Chin tuck not beneficial in eliminating penetration or presence of residue.     Cervical Esophageal Phase  · Decreased UES opening    Assessment:     Impressions  ·   Patient demonstrates moderate  pharyngeal dysphagia characterized by  deep laryngeal penetration to the vocal folds with thin and nectar thickened liquids along with severe vallecular stasis of puree requiring 8+ swallows to clear ~75% of bolus. .     Prognosis: Good    Plan  SLP to follow up for introduction to dysphagia exercises.     Education  Results were discussed with patient. Results were discussed with Medical Team who was in agreement with plan.     Goals:   Multidisciplinary Problems     SLP Goals        Problem: SLP    Goal Priority Disciplines Outcome   SLP Goal     SLP    Description: Speech Language Pathology Goals  Goals expected to be met by 8/18    1.Pt will participate in more objective swallow assessment via MBSS to help determine least restrictive diet   2. . Pt will tolerate  PMSV for waking hours to increase phonation, respiration and swallowing aspects  3. Pt/family will don/doff PMSV x1 during session with min cues                       Plan:   · Patient to be seen:  Therapy Frequency: 4 x/week   · Plan of Care expires:     · Plan of Care reviewed with:  patient        Discharge recommendations:  rehabilitation facility   Barriers to Discharge:  None    Time Tracking:   SLP Treatment Date:   08/12/22  Speech Start Time:  0931  Speech Stop Time:  0958     Speech Total Time (min):  27 min    08/12/2022

## 2022-08-12 NOTE — PLAN OF CARE
Endocrinology following for:    Amiodarone-induced hyperthyroidism  - History of AIT type 2 (TRAb and TSI negative; Thyroid US unremarkable with low vascularity). Strongly suspect AIT type 2 with improvement seen currently with the use of prednisone.   - Heparin can cause a false elevation in free T4 as it displaces free T4 from TBG; will follow-up direct dialysis result which is still pending  - Continue Methimazole to 20 mg daily  - Continue Prednisone to 40 mg daily  - Continue checking daily free T4     Hyperglycemia  - Hyperglycemia noted once starting TPN in addition to steroids  - No prior history of diabetes; most recent A1c of 5.4 on 04/26/2021  - Blood sugars at goal      Plan  - Continue levemir 6 units nightly  - Continue Aspart to 4 units every 4 hour with low correction while on Tube Feeds  - Please notify endocrine if any change in tube feeds as this will change insulin requirements.  - Please hold insulin if tube feeds are held for some reason  - Please ensure that accuchecks are being documented every 4 hours

## 2022-08-13 PROBLEM — F43.23 ADJUSTMENT DISORDER WITH MIXED ANXIETY AND DEPRESSED MOOD: Status: ACTIVE | Noted: 2022-08-13

## 2022-08-13 LAB
ALBUMIN SERPL BCP-MCNC: 2.3 G/DL (ref 3.5–5.2)
ALP SERPL-CCNC: 195 U/L (ref 55–135)
ALT SERPL W/O P-5'-P-CCNC: 59 U/L (ref 10–44)
ANION GAP SERPL CALC-SCNC: 10 MMOL/L (ref 8–16)
ANION GAP SERPL CALC-SCNC: 10 MMOL/L (ref 8–16)
ANION GAP SERPL CALC-SCNC: 7 MMOL/L (ref 8–16)
ANION GAP SERPL CALC-SCNC: 9 MMOL/L (ref 8–16)
ANISOCYTOSIS BLD QL SMEAR: SLIGHT
APTT BLDCRRT: 31.2 SEC (ref 21–32)
APTT BLDCRRT: 39.9 SEC (ref 21–32)
APTT BLDCRRT: 40.8 SEC (ref 21–32)
APTT BLDCRRT: 42.7 SEC (ref 21–32)
AST SERPL-CCNC: 73 U/L (ref 10–40)
BASO STIPL BLD QL SMEAR: ABNORMAL
BASO STIPL BLD QL SMEAR: ABNORMAL
BASOPHILS # BLD AUTO: 0.07 K/UL (ref 0–0.2)
BASOPHILS NFR BLD: 0 % (ref 0–1.9)
BASOPHILS NFR BLD: 0 % (ref 0–1.9)
BASOPHILS NFR BLD: 0.2 % (ref 0–1.9)
BILIRUB DIRECT SERPL-MCNC: 1.6 MG/DL (ref 0.1–0.3)
BILIRUB SERPL-MCNC: 2.9 MG/DL (ref 0.1–1)
BUN SERPL-MCNC: 52 MG/DL (ref 6–20)
BUN SERPL-MCNC: 53 MG/DL (ref 6–20)
CALCIUM SERPL-MCNC: 9.5 MG/DL (ref 8.7–10.5)
CALCIUM SERPL-MCNC: 9.6 MG/DL (ref 8.7–10.5)
CALCIUM SERPL-MCNC: 9.6 MG/DL (ref 8.7–10.5)
CALCIUM SERPL-MCNC: 9.7 MG/DL (ref 8.7–10.5)
CHLORIDE SERPL-SCNC: 113 MMOL/L (ref 95–110)
CHLORIDE SERPL-SCNC: 113 MMOL/L (ref 95–110)
CHLORIDE SERPL-SCNC: 116 MMOL/L (ref 95–110)
CHLORIDE SERPL-SCNC: 116 MMOL/L (ref 95–110)
CO2 SERPL-SCNC: 15 MMOL/L (ref 23–29)
CO2 SERPL-SCNC: 15 MMOL/L (ref 23–29)
CO2 SERPL-SCNC: 16 MMOL/L (ref 23–29)
CO2 SERPL-SCNC: 17 MMOL/L (ref 23–29)
CREAT SERPL-MCNC: 1.3 MG/DL (ref 0.5–1.4)
CREAT SERPL-MCNC: 1.4 MG/DL (ref 0.5–1.4)
CREAT SERPL-MCNC: 1.4 MG/DL (ref 0.5–1.4)
CREAT SERPL-MCNC: 1.6 MG/DL (ref 0.5–1.4)
DIFFERENTIAL METHOD: ABNORMAL
EOSINOPHIL # BLD AUTO: 0 K/UL (ref 0–0.5)
EOSINOPHIL NFR BLD: 0 % (ref 0–8)
EOSINOPHIL NFR BLD: 0 % (ref 0–8)
EOSINOPHIL NFR BLD: 0.1 % (ref 0–8)
ERYTHROCYTE [DISTWIDTH] IN BLOOD BY AUTOMATED COUNT: 18.7 % (ref 11.5–14.5)
ERYTHROCYTE [DISTWIDTH] IN BLOOD BY AUTOMATED COUNT: 18.9 % (ref 11.5–14.5)
ERYTHROCYTE [DISTWIDTH] IN BLOOD BY AUTOMATED COUNT: 19 % (ref 11.5–14.5)
EST. GFR  (NO RACE VARIABLE): 50.6 ML/MIN/1.73 M^2
EST. GFR  (NO RACE VARIABLE): 59.4 ML/MIN/1.73 M^2
EST. GFR  (NO RACE VARIABLE): 59.4 ML/MIN/1.73 M^2
EST. GFR  (NO RACE VARIABLE): >60 ML/MIN/1.73 M^2
FIBRINOGEN PPP-MCNC: 506 MG/DL (ref 182–400)
GLUCOSE SERPL-MCNC: 118 MG/DL (ref 70–110)
GLUCOSE SERPL-MCNC: 162 MG/DL (ref 70–110)
GLUCOSE SERPL-MCNC: 168 MG/DL (ref 70–110)
GLUCOSE SERPL-MCNC: 95 MG/DL (ref 70–110)
HCT VFR BLD AUTO: 27.3 % (ref 40–54)
HCT VFR BLD AUTO: 28.7 % (ref 40–54)
HCT VFR BLD AUTO: 29.9 % (ref 40–54)
HGB BLD-MCNC: 7.3 G/DL (ref 14–18)
HGB BLD-MCNC: 8 G/DL (ref 14–18)
HGB BLD-MCNC: 8.4 G/DL (ref 14–18)
HYPOCHROMIA BLD QL SMEAR: ABNORMAL
IMM GRANULOCYTES # BLD AUTO: 1.43 K/UL (ref 0–0.04)
IMM GRANULOCYTES # BLD AUTO: ABNORMAL K/UL (ref 0–0.04)
IMM GRANULOCYTES # BLD AUTO: ABNORMAL K/UL (ref 0–0.04)
IMM GRANULOCYTES NFR BLD AUTO: 4.8 % (ref 0–0.5)
IMM GRANULOCYTES NFR BLD AUTO: ABNORMAL % (ref 0–0.5)
IMM GRANULOCYTES NFR BLD AUTO: ABNORMAL % (ref 0–0.5)
INR PPP: 1 (ref 0.8–1.2)
LDH SERPL L TO P-CCNC: 275 U/L (ref 110–260)
LYMPHOCYTES # BLD AUTO: 0.9 K/UL (ref 1–4.8)
LYMPHOCYTES NFR BLD: 2.9 % (ref 18–48)
LYMPHOCYTES NFR BLD: 3 % (ref 18–48)
LYMPHOCYTES NFR BLD: 4 % (ref 18–48)
MAGNESIUM SERPL-MCNC: 2.7 MG/DL (ref 1.6–2.6)
MAGNESIUM SERPL-MCNC: 2.8 MG/DL (ref 1.6–2.6)
MAGNESIUM SERPL-MCNC: 2.8 MG/DL (ref 1.6–2.6)
MAGNESIUM SERPL-MCNC: 2.9 MG/DL (ref 1.6–2.6)
MCH RBC QN AUTO: 27.9 PG (ref 27–31)
MCH RBC QN AUTO: 28.5 PG (ref 27–31)
MCH RBC QN AUTO: 28.6 PG (ref 27–31)
MCHC RBC AUTO-ENTMCNC: 26.7 G/DL (ref 32–36)
MCHC RBC AUTO-ENTMCNC: 27.9 G/DL (ref 32–36)
MCHC RBC AUTO-ENTMCNC: 28.1 G/DL (ref 32–36)
MCV RBC AUTO: 101 FL (ref 82–98)
MCV RBC AUTO: 103 FL (ref 82–98)
MCV RBC AUTO: 104 FL (ref 82–98)
METAMYELOCYTES NFR BLD MANUAL: 1 %
METAMYELOCYTES NFR BLD MANUAL: 1 %
MONOCYTES # BLD AUTO: 1.4 K/UL (ref 0.3–1)
MONOCYTES NFR BLD: 1 % (ref 4–15)
MONOCYTES NFR BLD: 3 % (ref 4–15)
MONOCYTES NFR BLD: 4.8 % (ref 4–15)
MYELOCYTES NFR BLD MANUAL: 1 %
MYELOCYTES NFR BLD MANUAL: 1 %
NEUTROPHILS # BLD AUTO: 26 K/UL (ref 1.8–7.7)
NEUTROPHILS NFR BLD: 87.2 % (ref 38–73)
NEUTROPHILS NFR BLD: 90 % (ref 38–73)
NEUTROPHILS NFR BLD: 93 % (ref 38–73)
NEUTS BAND NFR BLD MANUAL: 1 %
NRBC BLD-RTO: 0 /100 WBC
OVALOCYTES BLD QL SMEAR: ABNORMAL
PHOSPHATE SERPL-MCNC: 2.5 MG/DL (ref 2.7–4.5)
PHOSPHATE SERPL-MCNC: 3 MG/DL (ref 2.7–4.5)
PHOSPHATE SERPL-MCNC: 4.2 MG/DL (ref 2.7–4.5)
PHOSPHATE SERPL-MCNC: 5.4 MG/DL (ref 2.7–4.5)
PLATELET # BLD AUTO: 204 K/UL (ref 150–450)
PLATELET # BLD AUTO: 225 K/UL (ref 150–450)
PLATELET # BLD AUTO: 227 K/UL (ref 150–450)
PLATELET BLD QL SMEAR: ABNORMAL
PLATELET BLD QL SMEAR: ABNORMAL
PMV BLD AUTO: 11.9 FL (ref 9.2–12.9)
PMV BLD AUTO: 12 FL (ref 9.2–12.9)
PMV BLD AUTO: 12.2 FL (ref 9.2–12.9)
POCT GLUCOSE: 117 MG/DL (ref 70–110)
POCT GLUCOSE: 133 MG/DL (ref 70–110)
POCT GLUCOSE: 135 MG/DL (ref 70–110)
POCT GLUCOSE: 137 MG/DL (ref 70–110)
POCT GLUCOSE: 147 MG/DL (ref 70–110)
POCT GLUCOSE: 150 MG/DL (ref 70–110)
POCT GLUCOSE: 174 MG/DL (ref 70–110)
POCT GLUCOSE: 96 MG/DL (ref 70–110)
POIKILOCYTOSIS BLD QL SMEAR: SLIGHT
POLYCHROMASIA BLD QL SMEAR: ABNORMAL
POTASSIUM SERPL-SCNC: 5.2 MMOL/L (ref 3.5–5.1)
POTASSIUM SERPL-SCNC: 5.8 MMOL/L (ref 3.5–5.1)
POTASSIUM SERPL-SCNC: 5.9 MMOL/L (ref 3.5–5.1)
POTASSIUM SERPL-SCNC: 6 MMOL/L (ref 3.5–5.1)
PROMYELOCYTES NFR BLD MANUAL: 1 %
PROT SERPL-MCNC: 6.9 G/DL (ref 6–8.4)
PROTHROMBIN TIME: 10.6 SEC (ref 9–12.5)
RBC # BLD AUTO: 2.62 M/UL (ref 4.6–6.2)
RBC # BLD AUTO: 2.8 M/UL (ref 4.6–6.2)
RBC # BLD AUTO: 2.95 M/UL (ref 4.6–6.2)
SODIUM SERPL-SCNC: 138 MMOL/L (ref 136–145)
SODIUM SERPL-SCNC: 138 MMOL/L (ref 136–145)
SODIUM SERPL-SCNC: 139 MMOL/L (ref 136–145)
SODIUM SERPL-SCNC: 142 MMOL/L (ref 136–145)
T4 FREE SERPL-MCNC: 1.69 NG/DL (ref 0.71–1.51)
WBC # BLD AUTO: 27.47 K/UL (ref 3.9–12.7)
WBC # BLD AUTO: 29.81 K/UL (ref 3.9–12.7)
WBC # BLD AUTO: 32.04 K/UL (ref 3.9–12.7)
WBC TOXIC VACUOLES BLD QL SMEAR: PRESENT

## 2022-08-13 PROCEDURE — 99232 SBSQ HOSP IP/OBS MODERATE 35: CPT | Mod: ,,, | Performed by: INTERNAL MEDICINE

## 2022-08-13 PROCEDURE — 25000003 PHARM REV CODE 250

## 2022-08-13 PROCEDURE — 25000242 PHARM REV CODE 250 ALT 637 W/ HCPCS: Performed by: THORACIC SURGERY (CARDIOTHORACIC VASCULAR SURGERY)

## 2022-08-13 PROCEDURE — 80048 BASIC METABOLIC PNL TOTAL CA: CPT | Performed by: STUDENT IN AN ORGANIZED HEALTH CARE EDUCATION/TRAINING PROGRAM

## 2022-08-13 PROCEDURE — 31720 CLEARANCE OF AIRWAYS: CPT

## 2022-08-13 PROCEDURE — 25000003 PHARM REV CODE 250: Performed by: STUDENT IN AN ORGANIZED HEALTH CARE EDUCATION/TRAINING PROGRAM

## 2022-08-13 PROCEDURE — 25000242 PHARM REV CODE 250 ALT 637 W/ HCPCS

## 2022-08-13 PROCEDURE — 83615 LACTATE (LD) (LDH) ENZYME: CPT | Performed by: INTERNAL MEDICINE

## 2022-08-13 PROCEDURE — 80076 HEPATIC FUNCTION PANEL: CPT | Performed by: STUDENT IN AN ORGANIZED HEALTH CARE EDUCATION/TRAINING PROGRAM

## 2022-08-13 PROCEDURE — 84439 ASSAY OF FREE THYROXINE: CPT | Performed by: STUDENT IN AN ORGANIZED HEALTH CARE EDUCATION/TRAINING PROGRAM

## 2022-08-13 PROCEDURE — 85384 FIBRINOGEN ACTIVITY: CPT | Performed by: THORACIC SURGERY (CARDIOTHORACIC VASCULAR SURGERY)

## 2022-08-13 PROCEDURE — 63600175 PHARM REV CODE 636 W HCPCS: Performed by: STUDENT IN AN ORGANIZED HEALTH CARE EDUCATION/TRAINING PROGRAM

## 2022-08-13 PROCEDURE — 85730 THROMBOPLASTIN TIME PARTIAL: CPT | Performed by: STUDENT IN AN ORGANIZED HEALTH CARE EDUCATION/TRAINING PROGRAM

## 2022-08-13 PROCEDURE — 85730 THROMBOPLASTIN TIME PARTIAL: CPT | Mod: 91 | Performed by: THORACIC SURGERY (CARDIOTHORACIC VASCULAR SURGERY)

## 2022-08-13 PROCEDURE — 27000221 HC OXYGEN, UP TO 24 HOURS

## 2022-08-13 PROCEDURE — 27000248 HC VAD-ADDITIONAL DAY

## 2022-08-13 PROCEDURE — 99223 PR INITIAL HOSPITAL CARE,LEVL III: ICD-10-PCS | Mod: ,,, | Performed by: PSYCHIATRY & NEUROLOGY

## 2022-08-13 PROCEDURE — 85730 THROMBOPLASTIN TIME PARTIAL: CPT | Mod: 91 | Performed by: INTERNAL MEDICINE

## 2022-08-13 PROCEDURE — 25000003 PHARM REV CODE 250: Performed by: PHYSICIAN ASSISTANT

## 2022-08-13 PROCEDURE — 25000003 PHARM REV CODE 250: Performed by: INTERNAL MEDICINE

## 2022-08-13 PROCEDURE — 94761 N-INVAS EAR/PLS OXIMETRY MLT: CPT

## 2022-08-13 PROCEDURE — 99223 1ST HOSP IP/OBS HIGH 75: CPT | Mod: ,,, | Performed by: PSYCHIATRY & NEUROLOGY

## 2022-08-13 PROCEDURE — 93750 INTERROGATION VAD IN PERSON: CPT | Mod: ,,, | Performed by: INTERNAL MEDICINE

## 2022-08-13 PROCEDURE — 99900035 HC TECH TIME PER 15 MIN (STAT)

## 2022-08-13 PROCEDURE — 83735 ASSAY OF MAGNESIUM: CPT | Mod: 91 | Performed by: INTERNAL MEDICINE

## 2022-08-13 PROCEDURE — 85610 PROTHROMBIN TIME: CPT | Mod: 91 | Performed by: THORACIC SURGERY (CARDIOTHORACIC VASCULAR SURGERY)

## 2022-08-13 PROCEDURE — 94667 MNPJ CHEST WALL 1ST: CPT

## 2022-08-13 PROCEDURE — 83735 ASSAY OF MAGNESIUM: CPT | Mod: 91 | Performed by: THORACIC SURGERY (CARDIOTHORACIC VASCULAR SURGERY)

## 2022-08-13 PROCEDURE — 85025 COMPLETE CBC W/AUTO DIFF WBC: CPT | Performed by: THORACIC SURGERY (CARDIOTHORACIC VASCULAR SURGERY)

## 2022-08-13 PROCEDURE — 84100 ASSAY OF PHOSPHORUS: CPT | Mod: 91 | Performed by: INTERNAL MEDICINE

## 2022-08-13 PROCEDURE — 94640 AIRWAY INHALATION TREATMENT: CPT

## 2022-08-13 PROCEDURE — 99900026 HC AIRWAY MAINTENANCE (STAT)

## 2022-08-13 PROCEDURE — 63600175 PHARM REV CODE 636 W HCPCS: Performed by: INTERNAL MEDICINE

## 2022-08-13 PROCEDURE — 93750 PR INTERROGATE VENT ASSIST DEV, IN PERSON, W PHYSICIAN ANALYSIS: ICD-10-PCS | Mod: ,,, | Performed by: INTERNAL MEDICINE

## 2022-08-13 PROCEDURE — 99233 SBSQ HOSP IP/OBS HIGH 50: CPT | Mod: ,,, | Performed by: INTERNAL MEDICINE

## 2022-08-13 PROCEDURE — 84100 ASSAY OF PHOSPHORUS: CPT | Performed by: THORACIC SURGERY (CARDIOTHORACIC VASCULAR SURGERY)

## 2022-08-13 PROCEDURE — 94664 DEMO&/EVAL PT USE INHALER: CPT

## 2022-08-13 PROCEDURE — 85007 BL SMEAR W/DIFF WBC COUNT: CPT | Performed by: THORACIC SURGERY (CARDIOTHORACIC VASCULAR SURGERY)

## 2022-08-13 PROCEDURE — 85027 COMPLETE CBC AUTOMATED: CPT | Performed by: THORACIC SURGERY (CARDIOTHORACIC VASCULAR SURGERY)

## 2022-08-13 PROCEDURE — 99232 PR SUBSEQUENT HOSPITAL CARE,LEVL II: ICD-10-PCS | Mod: ,,, | Performed by: INTERNAL MEDICINE

## 2022-08-13 PROCEDURE — 99233 PR SUBSEQUENT HOSPITAL CARE,LEVL III: ICD-10-PCS | Mod: ,,, | Performed by: INTERNAL MEDICINE

## 2022-08-13 PROCEDURE — 20000000 HC ICU ROOM

## 2022-08-13 RX ORDER — IBUPROFEN 200 MG
16 TABLET ORAL
Status: DISCONTINUED | OUTPATIENT
Start: 2022-08-13 | End: 2022-08-22

## 2022-08-13 RX ORDER — GUAIFENESIN 100 MG/5ML
300 SOLUTION ORAL EVERY 6 HOURS PRN
Status: DISCONTINUED | OUTPATIENT
Start: 2022-08-13 | End: 2022-08-23

## 2022-08-13 RX ORDER — DOCUSATE SODIUM 50 MG/5ML
100 LIQUID ORAL DAILY
Status: DISCONTINUED | OUTPATIENT
Start: 2022-08-13 | End: 2022-08-18

## 2022-08-13 RX ORDER — POLYETHYLENE GLYCOL 3350 17 G/17G
17 POWDER, FOR SOLUTION ORAL DAILY
Status: DISCONTINUED | OUTPATIENT
Start: 2022-08-13 | End: 2022-08-18

## 2022-08-13 RX ORDER — CALCIUM GLUCONATE 20 MG/ML
1 INJECTION, SOLUTION INTRAVENOUS ONCE
Status: COMPLETED | OUTPATIENT
Start: 2022-08-13 | End: 2022-08-13

## 2022-08-13 RX ADMIN — INSULIN DETEMIR 6 UNITS: 100 INJECTION, SOLUTION SUBCUTANEOUS at 10:08

## 2022-08-13 RX ADMIN — LEVALBUTEROL HYDROCHLORIDE 0.63 MG: 0.63 SOLUTION RESPIRATORY (INHALATION) at 07:08

## 2022-08-13 RX ADMIN — PREDNISONE 40 MG: 20 TABLET ORAL at 08:08

## 2022-08-13 RX ADMIN — MEROPENEM 2 G: 1 INJECTION INTRAVENOUS at 06:08

## 2022-08-13 RX ADMIN — ACETYLCYSTEINE 4 ML: 100 SOLUTION ORAL; RESPIRATORY (INHALATION) at 12:08

## 2022-08-13 RX ADMIN — MEXILETINE HYDROCHLORIDE 400 MG: 200 CAPSULE ORAL at 09:08

## 2022-08-13 RX ADMIN — INSULIN ASPART 4 UNITS: 100 INJECTION, SOLUTION INTRAVENOUS; SUBCUTANEOUS at 03:08

## 2022-08-13 RX ADMIN — HYDROXYZINE HYDROCHLORIDE 25 MG: 25 TABLET ORAL at 11:08

## 2022-08-13 RX ADMIN — ACETYLCYSTEINE 4 ML: 100 SOLUTION ORAL; RESPIRATORY (INHALATION) at 07:08

## 2022-08-13 RX ADMIN — INSULIN HUMAN 10 UNITS: 100 INJECTION, SOLUTION PARENTERAL at 08:08

## 2022-08-13 RX ADMIN — MIRTAZAPINE 15 MG: 15 TABLET, FILM COATED ORAL at 08:08

## 2022-08-13 RX ADMIN — METHIMAZOLE 20 MG: 10 TABLET ORAL at 08:08

## 2022-08-13 RX ADMIN — INSULIN ASPART 4 UNITS: 100 INJECTION, SOLUTION INTRAVENOUS; SUBCUTANEOUS at 04:08

## 2022-08-13 RX ADMIN — MEXILETINE HYDROCHLORIDE 400 MG: 200 CAPSULE ORAL at 01:08

## 2022-08-13 RX ADMIN — CHOLESTYRAMINE 4 G: 4 POWDER, FOR SUSPENSION ORAL at 08:08

## 2022-08-13 RX ADMIN — INSULIN ASPART 4 UNITS: 100 INJECTION, SOLUTION INTRAVENOUS; SUBCUTANEOUS at 11:08

## 2022-08-13 RX ADMIN — HYDROXYZINE HYDROCHLORIDE 25 MG: 25 TABLET ORAL at 12:08

## 2022-08-13 RX ADMIN — LEVALBUTEROL HYDROCHLORIDE 0.63 MG: 0.63 SOLUTION RESPIRATORY (INHALATION) at 12:08

## 2022-08-13 RX ADMIN — CHOLESTYRAMINE 4 G: 4 POWDER, FOR SUSPENSION ORAL at 12:08

## 2022-08-13 RX ADMIN — MEXILETINE HYDROCHLORIDE 400 MG: 200 CAPSULE ORAL at 06:08

## 2022-08-13 RX ADMIN — HEPARIN SODIUM 23 UNITS/KG/HR: 5000 INJECTION INTRAVENOUS; SUBCUTANEOUS at 05:08

## 2022-08-13 RX ADMIN — CALCIUM GLUCONATE 1 G: 20 INJECTION, SOLUTION INTRAVENOUS at 08:08

## 2022-08-13 RX ADMIN — MEROPENEM 2 G: 1 INJECTION INTRAVENOUS at 01:08

## 2022-08-13 RX ADMIN — MEROPENEM 2 G: 1 INJECTION INTRAVENOUS at 09:08

## 2022-08-13 RX ADMIN — INSULIN ASPART 4 UNITS: 100 INJECTION, SOLUTION INTRAVENOUS; SUBCUTANEOUS at 07:08

## 2022-08-13 RX ADMIN — HYDROXYZINE HYDROCHLORIDE 25 MG: 25 TABLET ORAL at 08:08

## 2022-08-13 RX ADMIN — ASPIRIN 81 MG CHEWABLE TABLET 81 MG: 81 TABLET CHEWABLE at 08:08

## 2022-08-13 RX ADMIN — DEXTROSE 250 ML: 10 SOLUTION INTRAVENOUS at 08:08

## 2022-08-13 RX ADMIN — PANTOPRAZOLE SODIUM 40 MG: 40 GRANULE, DELAYED RELEASE ORAL at 08:08

## 2022-08-13 RX ADMIN — SODIUM PHOSPHATE, MONOBASIC, MONOHYDRATE 30 MMOL: 276; 142 INJECTION, SOLUTION INTRAVENOUS at 01:08

## 2022-08-13 RX ADMIN — Medication 16 G: at 10:08

## 2022-08-13 RX ADMIN — AMIODARONE HYDROCHLORIDE 400 MG: 200 TABLET ORAL at 08:08

## 2022-08-13 RX ADMIN — HEPARIN SODIUM 21 UNITS/KG/HR: 5000 INJECTION INTRAVENOUS; SUBCUTANEOUS at 03:08

## 2022-08-13 RX ADMIN — Medication 6 MG: at 10:08

## 2022-08-13 RX ADMIN — CHOLESTYRAMINE 4 G: 4 POWDER, FOR SUSPENSION ORAL at 05:08

## 2022-08-13 NOTE — PT/OT/SLP PROGRESS
Physical Therapy      Patient Name:  Tim Richards   MRN:  [id number]    Patient not seen today secondary to  (CAROL ANN for MBSS). Will follow-up as appropriate for progressive mobility.

## 2022-08-13 NOTE — ASSESSMENT & PLAN NOTE
Tim Richards is a 56 y.o. male with a past psychiatric history of generalized anxiety disorder, adjustment disorder, and insomnia who presented to [facility] due to Left ventricular assist device (LVAD) complication. Psychiatry was consulted for depression. Remeron initiated 8/12, pt states that he continues to have poor sleep as well as continued severe anxiety.     IMPRESSION  Insomnia   Adjustment Disorder with mixed anxiety and depressed mood     - Continue Remeron 15mg qhs; plan to continue to assess efficacy of remeron for sleep; consider switch to home ambien CR 12.5mg PRN qhs  - Resume home xanax 1mg PRN daily, ordered by primary team  - Resume buspar 5mg TID; received this afternoon  - 7/30/22 EKG QTc 510; please obtain repeat EKG  - Please limit nighttime interruptions as much as possible.   - Patient does not meet criteria for PEC or inpatient psychiatric admission at this time. Patient is not currently an imminent danger to self or others and is not gravely disabled due to a psychiatric illness.

## 2022-08-13 NOTE — CONSULTS
[facility] - [facility] Intensive Care  Psychiatry  Consult Note    Patient Name: Tim Richards  MRN: [id number]   Code Status: Full Code  Admission Date: 6/27/2022  Hospital Length of Stay: 47 days  Attending Physician: Isaiah Santizo MD  Primary Care Provider: Deyanira Booth MD    Current Legal Status: Uncontested    Patient information was obtained from patient and primary team.   Inpatient consult to Psychiatry  Consult performed by: Andres Gaspar MD  Consult ordered by: Jluis Nolen MD  Reason for consult: depression        Subjective:     Principal Problem:Left ventricular assist device (LVAD) complication    Chief Complaint:  depression    HPI:   Tim Richards is a 56 y.o. male with a past psychiatric history of generalized anxiety disorder, adjustment disorder, and insomnia who [place] [facility] [place] Left ventricular assist device (LVAD) complication. Psychiatry was consulted for depression.    Per Primary Team:  56-yo M with hx of Stage D HFrEF (NICMP, EF 10%, previously NYHA II), s/p  HM2 implanted in march/2018, SC ICD, VT on amiodarone and mexiletine and amiodarone induced hypothyroidism. Pt refers that he was in his usual state until 3-4 days ago when he visited a family member's house and had dietary indiscretions. Refers that since then he has had low appetite. Also states today he started to notice a dark urine and increasing SOB so he decided to come to the ED. He denies palpitaitons, ICD shocks, but refers some chest tightness.   He also refers chills and soft stools for the past 2 days.  Denied episodes of bleeding.     Per 7/11 Psychiatry Consult:  On interview with resident, patient was somewhat guarded as he had already talked with the attending psychiatrist who is his outpatient provider. He did complain of poor sleep, which he attributed to not being on his home medication and being woken up throughout the night by hospital staff. Mentioned some anxiety over his upcoming  surgery. Denied any depressed mood. Patient denied a history of anxiety and reported that he is unsure why he takes Xanax. He denied any past psychiatric hospitalizations and any other psychiatric diagnoses other than insomnia. Patient did mention that he follows with Dr. Krueger and that he plans to continue receiving outpatient care with him after discharge.     Per 8/13 Psychiatry Consult:  Patient seen resting in bed comfortably, states that he has been feeling depressed with being stuck in bed 24/7 as he has been hospitalized since 6/27. Voices that he has some interest in starting on some medications for depressed mood at this time. Denies being on any psychiatric medications for mood besides some xanax by Dr. Krueger. He has been feeling disappointed after he was unable to pass the barium swallow. Denies SI, HI, AVH.    Psychiatric Review of Systems-is patient experiencing or having changes in  sleep: yes  appetite: no  weight: no  energy/anergy: yes  interest/pleasure/anhedonia: no  somatic symptoms: no  libido: did not assess  anxiety/panic: yes  guilty/hopelessness: yes  concentration: no  S.I.B.s/risky behavior: no  any drugs: no  alcohol: no     Allergies:  Lisinopril and Hydralazine analogues    Past Medical/Surgical History:  Past Medical History:   Diagnosis Date    Anticoagulant long-term use     CHF (congestive heart failure)     Class 1 obesity due to excess calories with serious comorbidity and body mass index (BMI) of 31.0 to 31.9 in adult     Dilated cardiomyopathy 1/10/2018    Disorder of kidney and ureter     CKD    Encounter for blood transfusion     Gout     HTN (hypertension)     Hx of psychiatric care     ICD (implantable cardioverter-defibrillator) infection 7/1/2020    Psychiatric problem     Thyroid disease     Ventricular tachycardia (paroxysmal)      Past Surgical History:   Procedure Laterality Date    CARDIAC CATHETERIZATION  Dec. 2012    CARDIAC DEFIBRILLATOR  PLACEMENT Left     CRRT-D    COLONOSCOPY N/A 3/6/2018    Procedure: COLONOSCOPY;  Surgeon: Alonso Bone MD;  Location: [facility] ENDO (2ND FLR);  Service: Endoscopy;  Laterality: N/A;    COLONOSCOPY N/A 7/17/2019    Procedure: COLONOSCOPY;  Surgeon: Blane Valdez MD;  Location: [facility] ENDO (2ND FLR);  Service: Endoscopy;  Laterality: N/A;    COLONOSCOPY N/A 7/18/2019    Procedure: COLONOSCOPY;  Surgeon: Blane Valdez MD;  Location: [facility] ENDO (2ND FLR);  Service: Endoscopy;  Laterality: N/A;    ESOPHAGOGASTRODUODENOSCOPY N/A 7/17/2019    Procedure: EGD (ESOPHAGOGASTRODUODENOSCOPY);  Surgeon: Blane Valdez MD;  Location: [facility] ENDO (2ND FLR);  Service: Endoscopy;  Laterality: N/A;    ESOPHAGOGASTRODUODENOSCOPY N/A 7/18/2019    Procedure: EGD (ESOPHAGOGASTRODUODENOSCOPY);  Surgeon: Blane Valdez MD;  Location: [facility] ENDO (2ND FLR);  Service: Endoscopy;  Laterality: N/A;    NONINVASIVE CARDIAC ELECTROPHYSIOLOGY STUDY N/A 10/18/2019    Procedure: CARDIAC ELECTROPHYSIOLOGY STUDY, NONINVASIVE;  Surgeon: Raz Wagner MD;  Location: [facility] EP LAB;  Service: Cardiology;  Laterality: N/A;  VT, DFTs, MDT CRTD in situ, LVAD, anes, MB, 3098    REPLACEMENT OF IMPLANTABLE CARDIOVERTER-DEFIBRILLATOR (ICD) GENERATOR N/A 3/9/2020    Procedure: REPLACEMENT, ICD GENERATOR;  Surgeon: Harry Yun MD;  Location: [facility] EP LAB;  Service: Cardiology;  Laterality: N/A;  VT, ICD Gen Change and Lead Revision, MDT, MAC, DM,3 Prep    REVISION OF IMPLANTABLE CARDIOVERTER-DEFIBRILLATOR (ICD) ELECTRODE LEAD PLACEMENT N/A 3/9/2020    Procedure: REVISION, INSERTION, ELECTRODE LEAD, ICD;  Surgeon: Harry Yun MD;  Location: [facility] EP LAB;  Service: Cardiology;  Laterality: N/A;  VT, ICD Gen Change and Lead Revision, MDT, MAC, DM,3 Prep    TREATMENT OF CARDIAC ARRHYTHMIA  10/18/2019    Procedure: Cardioversion or Defibrillation;  Surgeon: Raz Wagner MD;  Location: [facility] EP LAB;  Service: Cardiology;;       Past Psychiatric History:  Previous  Medication Trials: yes, xanax, ambien  Previous Psychiatric Hospitalizations: no   Previous Suicide Attempts: no   History of Violence: unknown  Outpatient Psychiatrist: yes, Dr. Krueger     Social History:  Marital Status:   Children: 3   Employment Status/Info: on disability  Education:  college  Special Ed: no  Housing Status: with family   History of phys/sexual abuse: unknown  Access to gun: no    Substance Abuse History:  Recreational Drugs:  denied  Use of Alcohol: denied  Rehab History: unknown   Tobacco Use: no  Use of OTC: denied    Legal History:  Past Charges/Incarcerations: unknown   Pending charges: unknown     Family Psychiatric History:   unknown    Psychosocial Stressors: health  Functioning Relationships: good support system        Hospital Course: No notes on file    No new subjective & objective note has been filed under this hospital service since the last note was generated.    Assessment/Plan:     Adjustment disorder with mixed anxiety and depressed mood  Tim Richards is a 56 y.o. male with a past psychiatric history of generalized anxiety disorder, adjustment disorder, and insomnia who presented to [facility] due to Left ventricular assist device (LVAD) complication. Psychiatry was consulted for depression. Remeron initiated 8/12, pt states that he continues to have poor sleep as well as continued severe anxiety.     IMPRESSION  Insomnia   Adjustment Disorder with mixed anxiety and depressed mood     - continue remeron 15mg qhs; plan to continue to assess efficacy of remeron for sleep; consider switch to home ambien CR 12.5mg PRN qhs  - resume home xanax 1mg PRN daily  - resume buspar 5mg TID  - 7/30/22 EKG QTc 510; please obtain repeat EKG  - Please limit nighttime interruptions as much as possible.   - Patient does not meet criteria for PEC or inpatient psychiatric admission at this time. Patient is not currently an imminent danger to self or others and is not gravely disabled due  to a psychiatric illness.          Andres Gaspar MD   [facility]

## 2022-08-13 NOTE — SUBJECTIVE & OBJECTIVE
Interval History: No issues overnight. Complaining of feeling depressed. No suicidal or homicidal ideation. Mainly feeling frustration after having failed barium swallow study. He was encouraged  to continue towards improvement and was reassured. We offered antidepressants and psychological aid, and he is receptive.     Continuous Infusions:   dextrose 10 % in water (D10W)      heparin (porcine) in D5W 23 Units/kg/hr (08/13/22 0900)     Scheduled Meds:   acetylcysteine 100 mg/ml (10%)  4 mL Nebulization Q6H    amiodarone  400 mg Oral Daily    aspirin  81 mg Per OG tube Daily    cholestyramine  1 packet Per NG tube QID    docusate  100 mg Per NG tube Daily    guaiFENesin 100 mg/5 ml  300 mg Per NG tube Q6H    insulin aspart U-100  4 Units Subcutaneous 6 times per day    insulin detemir U-100  6 Units Subcutaneous QHS    levalbuterol  0.63 mg Nebulization Q6H    magnesium oxide  400 mg Oral BID    meropenem (MERREM) IVPB  2 g Intravenous Q8H    methIMAzole  20 mg Per NG tube Daily    mexiletine  400 mg Oral Q8H    mirtazapine  15 mg Oral QHS    ondansetron  4 mg Intravenous Once    pantoprazole  40 mg Per NG tube Daily    polyethylene glycol  17 g Per NG tube Daily    predniSONE  40 mg Per NG tube Daily     PRN Meds:sodium chloride 0.9%, sodium chloride 0.9%, acetaminophen, artificial tears, barium sulfate, barium sulfate, bisacodyL, [COMPLETED] calcium gluconate IVPB **AND** calcium gluconate IVPB, dextrose 10 % in water (D10W), dextrose 10%, dextrose 10%, glucagon (human recombinant), HYDROmorphone, HYDROmorphone, hydrOXYzine HCL, insulin aspart U-100, melatonin    Review of patient's allergies indicates:   Allergen Reactions    Lisinopril Anaphylaxis    Hydralazine analogues      Chronic constipation, impotence, dizziness     Objective:     Vital Signs (Most Recent):  Temp: 97.5 °F (36.4 °C) (08/13/22 0700)  Pulse: 76 (08/13/22 0900)  Resp: (!) 22 (08/13/22 0900)  BP: (!) 70/0 (08/13/22 0700)  SpO2: 99 % (08/13/22  0700)   Vital Signs (24h Range):  Temp:  [97.5 °F (36.4 °C)-98.5 °F (36.9 °C)] 97.5 °F (36.4 °C)  Pulse:  [64-87] 76  Resp:  [16-31] 22  SpO2:  [97 %-99 %] 99 %  BP: (70-82)/(0) 70/0  Arterial Line BP: (77-99)/(59-87) 81/70     Patient Vitals for the past 72 hrs (Last 3 readings):   Weight   08/13/22 0600 84.4 kg (186 lb)   08/11/22 0404 88.5 kg (195 lb)     Body mass index is 24.54 kg/m².      Intake/Output Summary (Last 24 hours) at 8/13/2022 0949  Last data filed at 8/13/2022 0900  Gross per 24 hour   Intake 2091.12 ml   Output 1425 ml   Net 666.12 ml       Hemodynamic Parameters:       Physical Exam  Vitals and nursing note reviewed.   Constitutional:       General: He is not in acute distress.     Appearance: Normal appearance. He is normal weight.      Comments: NGT in place (feeding)   HENT:      Head: Normocephalic.      Mouth/Throat:      Mouth: Mucous membranes are moist.   Cardiovascular:      Rate and Rhythm: Normal rate.      Heart sounds: Murmur heard.   Pulmonary:      Effort: Pulmonary effort is normal. No respiratory distress.      Breath sounds: Normal breath sounds. No wheezing or rales.   Abdominal:      General: Bowel sounds are normal. There is no distension.      Palpations: Abdomen is soft.      Tenderness: There is no abdominal tenderness.   Musculoskeletal:         General: No swelling.      Cervical back: Neck supple.   Skin:     General: Skin is warm.      Capillary Refill: Capillary refill takes less than 2 seconds.   Neurological:      General: No focal deficit present.      Mental Status: He is alert.      Motor: Weakness present.   Psychiatric:         Mood and Affect: Mood normal.       Significant Labs:  CBC:  Recent Labs   Lab 08/12/22  1152 08/12/22  1949 08/13/22  0317   WBC 29.89* 29.27* 29.81*   RBC 3.02* 2.92* 2.95*   HGB 8.3* 8.5* 8.4*   HCT 30.6* 30.0* 29.9*    217 227   * 103* 101*   MCH 27.5 29.1 28.5   MCHC 27.1* 28.3* 28.1*     BNP:  Recent Labs   Lab  08/08/22  0310 08/10/22  0333 08/12/22  0325   * 216* 230*     CMP:  Recent Labs   Lab 08/12/22  1152 08/12/22  1949 08/12/22  2321 08/13/22  0317   * 159* 95 118*   CALCIUM 9.9 9.3 9.7 9.6   ALBUMIN 2.2* 2.2*  --  2.3*   PROT 6.6 6.1  --  6.9    140 142 139   K 5.2* 6.2* 5.8* 5.9*   CO2 18* 16* 16* 17*   * 116* 116* 113*   BUN 53* 54* 53* 53*   CREATININE 1.4 1.5* 1.4 1.4   ALKPHOS 194* 205*  --  195*   ALT 58* 61*  --  59*   AST 50* 51*  --  73*   BILITOT 3.2* 3.1*  --  2.9*      Coagulation:   Recent Labs   Lab 08/12/22  1152 08/12/22  1744 08/12/22 1949 08/13/22  0117 08/13/22  0317   INR 1.0  --  1.0  --  1.0   APTT  --  29.9  --  39.9* 31.2     LDH:  Recent Labs   Lab 08/11/22  0305 08/12/22  0509   * 205     Microbiology:  Microbiology Results (last 7 days)       Procedure Component Value Units Date/Time    Blood culture [[id number]] Collected: 08/09/22 1045    Order Status: Completed Specimen: Blood from Peripheral, Hand, Right Updated: 08/12/22 1212     Blood Culture, Routine No Growth to date      No Growth to date      No Growth to date      No Growth to date    Blood culture [[id number]] Collected: 08/09/22 1102    Order Status: Completed Specimen: Blood from Peripheral, Antecubital, Left Updated: 08/12/22 1212     Blood Culture, Routine No Growth to date      No Growth to date      No Growth to date      No Growth to date    Aerobic culture [[id number]] Collected: 08/09/22 1347    Order Status: Completed Specimen: Incision site from Abdomen Updated: 08/12/22 1120     Aerobic Bacterial Culture No growth    Narrative:      DL site.    Culture, Respiratory with Gram Stain [[id number]] Collected: 08/09/22 1026    Order Status: Completed Specimen: Respiratory from Endotracheal Aspirate Updated: 08/11/22 1051     Respiratory Culture Normal respiratory sachin      No S aureus or Pseudomonas isolated.     Gram Stain (Respiratory) <10 epithelial cells per low power field.     Gram  Stain (Respiratory) Rare WBC's     Gram Stain (Respiratory) No organisms seen    Culture, Anaerobe [[id number]] Collected: 08/09/22 1347    Order Status: Completed Specimen: Incision site from Abdomen Updated: 08/11/22 0925     Anaerobic Culture Culture in progress    Narrative:      DL site    IV catheter culture [[id number]] Collected: 08/04/22 1525    Order Status: Completed Specimen: Catheter Tip, Intrajugular Updated: 08/06/22 0950     Aerobic Culture - Cath tip No growth            Estimated Creatinine Clearance: 67.4 mL/min (based on SCr of 1.4 mg/dL).

## 2022-08-13 NOTE — PLAN OF CARE
SICU PLAN OF CARE NOTE     Dx: Left ventricular assist device (LVAD) complication     Shift Events: No acute events overnight.     Gtts: Heparin      Neuro: AAO x4, Follows Commands and Moves All Extremities     Cardiac: NSR 70s-80s     Respiratory: trach collar 8LPM 30%     GI: Tube Feeds @ 60cc/hr (goal)     : Voids Spontaneously 850 cc/shift     VAD  Flows: 5.6-5.7, Speed: 6300, PI: 1.5-2.1, LSL 4900.     Labs/Accuchecks: q8h labs, accuchecks q4h     Skin: No new skin breakdown noted, pt turned and repositioned throughout shift. Immerse bed in use, bed plugged in, wheels locked, call light in reach. Foams in place, pillows in use.

## 2022-08-13 NOTE — SUBJECTIVE & OBJECTIVE
"Interval HPI:   fT4 at 1.69  Blood sugars stable, at goal    BP (!) 70/0 (BP Location: Right arm, Patient Position: Lying)   Pulse 77   Temp 97.5 °F (36.4 °C) (Oral)   Resp (!) 22   Ht 6' 1" (1.854 m)   Wt 84.4 kg (186 lb)   SpO2 99%   BMI 24.54 kg/m²     Labs Reviewed and Include    Recent Labs   Lab 08/13/22 0317   *   CALCIUM 9.6   ALBUMIN 2.3*   PROT 6.9      K 5.9*   CO2 17*   *   BUN 53*   CREATININE 1.4   ALKPHOS 195*   ALT 59*   AST 73*   BILITOT 2.9*     Lab Results   Component Value Date    WBC 29.81 (H) 08/13/2022    HGB 8.4 (L) 08/13/2022    HCT 29.9 (L) 08/13/2022     (H) 08/13/2022     08/13/2022     Recent Labs   Lab 08/12/22  0325 08/13/22 0317   FREET4 1.62* 1.69*     Lab Results   Component Value Date    HGBA1C 5.4 04/26/2021       Nutritional status:   Body mass index is 24.54 kg/m².  Lab Results   Component Value Date    ALBUMIN 2.3 (L) 08/13/2022    ALBUMIN 2.2 (L) 08/12/2022    ALBUMIN 2.2 (L) 08/12/2022     Lab Results   Component Value Date    PREALBUMIN 32 08/11/2022    PREALBUMIN 28 08/05/2022    PREALBUMIN 13 (L) 07/29/2022       Estimated Creatinine Clearance: 67.4 mL/min (based on SCr of 1.4 mg/dL).    Accu-Checks  Recent Labs     08/11/22  1306 08/11/22  1659 08/11/22  2104 08/11/22  2310 08/12/22  0857 08/12/22  1200 08/12/22  1950 08/12/22  2321 08/13/22  0316 08/13/22  0732   POCTGLUCOSE 172* 176* 157* 121* 117* 138* 150* 96 117* 135*       Current Medications and/or Treatments Impacting Glycemic Control  Immunotherapy:    Immunosuppressants       None          Steroids:   Hormones (From admission, onward)                Start     Stop Route Frequency Ordered    08/11/22 0900  predniSONE tablet 40 mg         -- PER NG TUBE Daily 08/10/22 0931    08/02/22 1931  melatonin tablet 6 mg         -- OG Nightly PRN 08/02/22 1832          Pressors:    Autonomic Drugs (From admission, onward)                Start     Stop Route Frequency Ordered    " 07/29/22 0011  EPINEPHrine (ADRENALIN) 1 mg/mL injection        Note to Pharmacy: Created by cabinet override    07/29 1214   07/29/22 0011          Hyperglycemia/Diabetes Medications:   Antihyperglycemics (From admission, onward)                Start     Stop Route Frequency Ordered    08/11/22 0000  insulin aspart U-100 pen 4 Units         -- SubQ 6 times per day 08/10/22 2055    08/03/22 2100  insulin detemir U-100 pen 6 Units         -- SubQ Nightly 08/03/22 1900    07/30/22 1023  insulin aspart U-100 pen 0-5 Units         -- SubQ Every 4 hours PRN 07/30/22 0970

## 2022-08-13 NOTE — PROGRESS NOTES
[facility]  Heart Transplant  Progress Note    Patient Name: Tim Richards  MRN: [id number]  Admission Date: 6/27/2022  Hospital Length of Stay: 47 days  Attending Physician: Isaiah Santizo MD  Primary Care Provider: Deyanira Booth MD  Principal Problem:Left ventricular assist device (LVAD) complication    Subjective:     Interval History: No issues overnight. Complaining of feeling depressed. No suicidal or homicidal ideation. Mainly feeling frustration after having failed barium swallow study. He was encouraged  to continue towards improvement and was reassured. We offered antidepressants and psychological aid, and he is receptive.     Continuous Infusions:   dextrose 10 % in water (D10W)      heparin (porcine) in D5W 23 Units/kg/hr (08/13/22 0900)     Scheduled Meds:   acetylcysteine 100 mg/ml (10%)  4 mL Nebulization Q6H    amiodarone  400 mg Oral Daily    aspirin  81 mg Per OG tube Daily    cholestyramine  1 packet Per NG tube QID    docusate  100 mg Per NG tube Daily    guaiFENesin 100 mg/5 ml  300 mg Per NG tube Q6H    insulin aspart U-100  4 Units Subcutaneous 6 times per day    insulin detemir U-100  6 Units Subcutaneous QHS    levalbuterol  0.63 mg Nebulization Q6H    magnesium oxide  400 mg Oral BID    meropenem (MERREM) IVPB  2 g Intravenous Q8H    methIMAzole  20 mg Per NG tube Daily    mexiletine  400 mg Oral Q8H    mirtazapine  15 mg Oral QHS    ondansetron  4 mg Intravenous Once    pantoprazole  40 mg Per NG tube Daily    polyethylene glycol  17 g Per NG tube Daily    predniSONE  40 mg Per NG tube Daily     PRN Meds:sodium chloride 0.9%, sodium chloride 0.9%, acetaminophen, artificial tears, barium sulfate, barium sulfate, bisacodyL, [COMPLETED] calcium gluconate IVPB **AND** calcium gluconate IVPB, dextrose 10 % in water (D10W), dextrose 10%, dextrose 10%, glucagon (human recombinant), HYDROmorphone, HYDROmorphone, hydrOXYzine HCL, insulin  aspart U-100, melatonin    Review of patient's allergies indicates:   Allergen Reactions    Lisinopril Anaphylaxis    Hydralazine analogues      Chronic constipation, impotence, dizziness     Objective:     Vital Signs (Most Recent):  Temp: 97.5 °F (36.4 °C) (08/13/22 0700)  Pulse: 76 (08/13/22 0900)  Resp: (!) 22 (08/13/22 0900)  BP: (!) 70/0 (08/13/22 0700)  SpO2: 99 % (08/13/22 0700)   Vital Signs (24h Range):  Temp:  [97.5 °F (36.4 °C)-98.5 °F (36.9 °C)] 97.5 °F (36.4 °C)  Pulse:  [64-87] 76  Resp:  [16-31] 22  SpO2:  [97 %-99 %] 99 %  BP: (70-82)/(0) 70/0  Arterial Line BP: (77-99)/(59-87) 81/70     Patient Vitals for the past 72 hrs (Last 3 readings):   Weight   08/13/22 0600 84.4 kg (186 lb)   08/11/22 0404 88.5 kg (195 lb)     Body mass index is 24.54 kg/m².      Intake/Output Summary (Last 24 hours) at 8/13/2022 0949  Last data filed at 8/13/2022 0900  Gross per 24 hour   Intake 2091.12 ml   Output 1425 ml   Net 666.12 ml       Hemodynamic Parameters:       Physical Exam  Vitals and nursing note reviewed.   Constitutional:       General: He is not in acute distress.     Appearance: Normal appearance. He is normal weight.      Comments: NGT in place (feeding)   HENT:      Head: Normocephalic.      Mouth/Throat:      Mouth: Mucous membranes are moist.   Cardiovascular:      Rate and Rhythm: Normal rate.      Heart sounds: Murmur heard.   Pulmonary:      Effort: Pulmonary effort is normal. No respiratory distress.      Breath sounds: Normal breath sounds. No wheezing or rales.   Abdominal:      General: Bowel sounds are normal. There is no distension.      Palpations: Abdomen is soft.      Tenderness: There is no abdominal tenderness.   Musculoskeletal:         General: No swelling.      Cervical back: Neck supple.   Skin:     General: Skin is warm.      Capillary Refill: Capillary refill takes less than 2 seconds.   Neurological:      General: No focal deficit present.      Mental Status: He is alert.       Motor: Weakness present.   Psychiatric:         Mood and Affect: Mood normal.       Significant Labs:  CBC:  Recent Labs   Lab 08/12/22  1152 08/12/22 1949 08/13/22 0317   WBC 29.89* 29.27* 29.81*   RBC 3.02* 2.92* 2.95*   HGB 8.3* 8.5* 8.4*   HCT 30.6* 30.0* 29.9*    217 227   * 103* 101*   MCH 27.5 29.1 28.5   MCHC 27.1* 28.3* 28.1*     BNP:  Recent Labs   Lab 08/08/22  0310 08/10/22  0333 08/12/22  0325   * 216* 230*     CMP:  Recent Labs   Lab 08/12/22  1152 08/12/22 1949 08/12/22 2321 08/13/22 0317   * 159* 95 118*   CALCIUM 9.9 9.3 9.7 9.6   ALBUMIN 2.2* 2.2*  --  2.3*   PROT 6.6 6.1  --  6.9    140 142 139   K 5.2* 6.2* 5.8* 5.9*   CO2 18* 16* 16* 17*   * 116* 116* 113*   BUN 53* 54* 53* 53*   CREATININE 1.4 1.5* 1.4 1.4   ALKPHOS 194* 205*  --  195*   ALT 58* 61*  --  59*   AST 50* 51*  --  73*   BILITOT 3.2* 3.1*  --  2.9*      Coagulation:   Recent Labs   Lab 08/12/22  1152 08/12/22  1744 08/12/22 1949 08/13/22  0117 08/13/22 0317   INR 1.0  --  1.0  --  1.0   APTT  --  29.9  --  39.9* 31.2     LDH:  Recent Labs   Lab 08/11/22  0305 08/12/22  0509   * 205     Microbiology:  Microbiology Results (last 7 days)       Procedure Component Value Units Date/Time    Blood culture [[id number]] Collected: 08/09/22 1045    Order Status: Completed Specimen: Blood from Peripheral, Hand, Right Updated: 08/12/22 1212     Blood Culture, Routine No Growth to date      No Growth to date      No Growth to date      No Growth to date    Blood culture [[id number]] Collected: 08/09/22 1102    Order Status: Completed Specimen: Blood from Peripheral, Antecubital, Left Updated: 08/12/22 1212     Blood Culture, Routine No Growth to date      No Growth to date      No Growth to date      No Growth to date    Aerobic culture [[id number]] Collected: 08/09/22 1347    Order Status: Completed Specimen: Incision site from Abdomen Updated: 08/12/22 1120     Aerobic Bacterial Culture No  growth    Narrative:      DL site.    Culture, Respiratory with Gram Stain [[id number]] Collected: 08/09/22 1026    Order Status: Completed Specimen: Respiratory from Endotracheal Aspirate Updated: 08/11/22 1051     Respiratory Culture Normal respiratory sachin      No S aureus or Pseudomonas isolated.     Gram Stain (Respiratory) <10 epithelial cells per low power field.     Gram Stain (Respiratory) Rare WBC's     Gram Stain (Respiratory) No organisms seen    Culture, Anaerobe [[id number]] Collected: 08/09/22 1347    Order Status: Completed Specimen: Incision site from Abdomen Updated: 08/11/22 0925     Anaerobic Culture Culture in progress    Narrative:      DL site    IV catheter culture [[id number]] Collected: 08/04/22 1525    Order Status: Completed Specimen: Catheter Tip, Intrajugular Updated: 08/06/22 0950     Aerobic Culture - Cath tip No growth            Estimated Creatinine Clearance: 67.4 mL/min (based on SCr of 1.4 mg/dL).    Assessment and Plan:     55 Y/O M with Hx of stage D HFrEF (EF 10%) due to NICM underwent HM2 implant 3/8/18 and exchange of outflow graft 3/9/18, Hx VT/VF s/p SICD 2014 presenting with gradual worsening shortness of breath associated with nonpleuritic, nonradiating bilateral 4/10 retrosternal chest pressure pain.  Symptoms began yesterday and have gradually worsened in the last 12 hours.  He does report going to a family picnic with increased consumption of chicken wings, ribs and other salty meat products.  Prior to symptom onset, he reports nausea, nonbloody diarrhea began yesterday and single episode of nonbloody nonbilious vomiting this morning. He also complains of dizziness, lightheadedness, and a mild HA. SOB worsened this morning prompting him to come to the ED.     In the ED, patient was in respiratory distress requiring BiPAP. MAP 96 and started on Nitro gtt. Work-up revealed WBC 10, K 5.4, Cr 2.5 (baseline 1.9), BNP 1950 (baseline 200-300s), Bili 2.1, LDH 1058, and INR 3.2.  Bedside TTE with severely reduced EF, AV not opening, septum bulging into RV. Given IV Lasix 80 mg x1 with only 100-200 mL UOP and dark in color. HM2 interrogated and flows have been 8.5-9 L/min with no alarms or power surges. Cardiology consulted in the ED, dicussed with HTS, and decision made to admit to ICU for further mgmt.       Assessment and Plan:  Cardiogenic Shock  -Previous HM2, underwent pump exchange to HM3 on 7/13/22 complicated by worsening CS/RV failure requiring VA ECMO now de-cannulated    Currently trach ed. Chest tube removal, bronch, and old driveline removed 7/22.  -Re-intubated on 7/29/22 due to hypercapnic resp failure. Trach placed on 8/4.   -Doing well on Trach collar.   -Off diuretics currently. Creatinine has trended down to baseline. Might restart PO diuretics tomorrow.      Trach site bleeding: Resolved.   - Continue minimal dosing heparin.   - Surgical team placed a suture on 8/11.      LVAD:  TXP LVAD INTERROGATIONS 8/13/2022 8/13/2022 8/13/2022 8/13/2022 8/13/2022 8/13/2022 8/13/2022   Type HeartMate3 HeartMate3 HeartMate3 HeartMate3 HeartMate3 HeartMate3 HeartMate3   Flow 5.4 5.4 5.5 5.5 5.5 5.6 5.6   Speed 6300 6300 6300 6300 6300 6300 6300   PI 3.3 2.9 2.7 2.9 3.1 2.5 2.1   Power ([place]) 5.3 5.3 5.4 5.4 5.3 5.4 5.4   LSL 5900 5900 5900 5900 5900 5900 5900   Low Flow Alarm - - - - - - -   Pulsatility - - - No Pulse No Pulse No Pulse No Pulse     - No significant events.   - Functioning well.      Fever: No fever in 7  Days:  Leucocytosis trending down albeit slowly.   ID team on board.   Changed to meropenem . Plan to continue meropenem for 7 days, currently on day#5 of 7.  No growth in cultures yet.      History of ventricular tachycardia/Episode of A flutter 2:1 block:  Patient experienced an episode of VT on 6/30 and experienced an ICD shock thought to be related to hypokalemia (K of 3.1 on 6/30)  Aggressively replete K, keep K>4 and Mg>2  Continue mexiletine PO.  Amio gtt  transitioned to PO.      COVID-19 virus infection:  -Previously hypoxic then recovered  -ID was consulted, recommended Remdesivir, course completed     Elevated serum lactate dehydrogenase (LDH):  S/p HM3 exchange for HM2 pump thrombosis  Continue to trend LDH.      Amiodarone induced hypothyrodism initially, now hyperthyroidism:  -Endocrine team on board.  -On prednisone and methimazole.   - Prednisone 40 mg.   -Continue to monitor free T4. Trending down.   - Medications has been changes as per Endocrinology team.     Jluis Nolen MD  Heart Transplant  [facility]

## 2022-08-13 NOTE — ASSESSMENT & PLAN NOTE
Key History and Diagnostic Findings  - Hyperglycemia noted once starting TPN in addition to steroids  - No prior history of diabetes; most recent A1c of 5.4 on 04/26/2021  - Blood sugars at goal for now    Plan  - Continue levemir 6 units QHS  - Continue Aspart to 4 units every 4 hour with low correction while on Tube Feeds  - Please notify endocrine if any change in tube feeds as this will change insulin requirements.  - Please hold insulin if tube feeds are held for some reason  - Please ensure that accuchecks are being documented every 4 hours

## 2022-08-13 NOTE — ASSESSMENT & PLAN NOTE
Key History and Diagnostic Findings  - History of AIT type 2 (TRAb and TSI negative; Thyroid US unremarkable with low vascularity)  - Weaned off methimazole and prednisone by May 2020, then developed hypothyroidism, Levothyroxine started and dose titrated per TFTs. Prior to current admission he was on Levothyroxine 112 mcg daily  - Labs consistent with hypothyroidism until current admission, initially on 7/13, with low TSH and elevated fT4. Repeat TFTs on 7/27 with worsening hyperthyroidism. He continued to receive LT4 112 mcg through 7/28. He remains on amiodarone for episodes of Ventricular Tachycardia  - Suspect current hyperthyroidism is likely due to AIT-2, however continued exogenous LT4 certainly contribute to current presentation   - Free T4 today at 1.69    Lab Results   Component Value Date    TSH <0.010 (L) 07/27/2022    TSH 0.111 (L) 07/13/2022    TSH 4.079 (H) 03/17/2022    FREET4 1.69 (H) 08/13/2022    FREET4 1.62 (H) 08/12/2022    FREET4 1.67 (H) 08/11/2022     Plan  - Strongly suspect AIT type 2 with improvement seen currently with the use of prednisone.   - Heparin can cause a false elevation in free T4 as it displaces free T4 from TBG; will follow-up direct dialysis result which is still pending from 8.5.22  - Continue Methimazole to 20 mg daily  - Continue Prednisone to 40 mg daily  - Continue checking daily free T4

## 2022-08-13 NOTE — ASSESSMENT & PLAN NOTE
Tim Richards is a 56 y.o. male with a past psychiatric history of generalized anxiety disorder, adjustment disorder, and insomnia who presented to [facility] due to Left ventricular assist device (LVAD) complication. Psychiatry was consulted for depression. Remeron initiated 8/12, pt states that he continues to have poor sleep.     IMPRESSION  Insomnia   Adjustment Disorder with mixed anxiety and depressed mood     - continue remeron 15mg qhs; plan to continue to assess efficacy of remeron for sleep; consider switch to home ambien CR 12.5mg PRN qhs  - resume home xanax 1mg PRN daily  - resume buspar 5mg TID  - 7/30/22 EKG QTc 510; please obtain repeat EKG  - Please limit nighttime interruptions as much as possible.   - Patient does not meet criteria for PEC or inpatient psychiatric admission at this time. Patient is not currently an imminent danger to self or others and is not gravely disabled due to a psychiatric illness.

## 2022-08-13 NOTE — PLAN OF CARE
Pt following commands and using speaking valve appropriately. Pt expresses he is depressed, psychiatric consult placed. Trach collar 40%, 8L all day, tolerated well. Heparin gtt infusing per nomogram. LVAD speed 6300, no alarms or issues. Dressing change per orders by RN. Wound care performed per orders. /shift. Tube feeds at 60 (goal). Wife at bedside, plan of care reviewed, verbalized understanding.

## 2022-08-13 NOTE — PROGRESS NOTES
"[facility]  Endocrinology  Progress Note    Admit Date: 6/27/2022     56 year-old  male with NICM with LVAD, s/p ICD for VT on amiodarone and mexiletine and AIT followed by hypothyroidism initially admitted 6/27/2022 with COVID respiratory failure complicated with LVAD pump thrombosis that was refractory to medical therapy. Underwent LVAD pump exchange. Post-op course complicated by worsening cardiogenic shock/RV failure requiring VA-ECMO. Improved clinically underwent successful decannulation. Continued episodes of VT with ICD shock 7/21 and ongoing anti-arrhythmic mediation adjustments. TFTs on 7/27 significant for recurrent hyperthyroidism with undetectable TSH and elevated fT4.    - Patient re-intubated 07/29/2022    - Endocrinology consulted for recurrent AIT    Regarding AIT:    - Last seen outpatient by Dr. 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 He required a prolonged course of prednisone and methimazole, then subsequently developed hypothyroidism in May 2020. LT4 was adjusted based on serial TFT measurements. Most recently levothyroxine dose has been 112 mcg     - He self-decreased his amiodarone dose to 200 mg daily about 6 months ago. T4 was high on 7/13, but he was continued on levothyroxine 112 mcg    Lab Results   Component Value Date    TSH <0.010 (L) 07/27/2022    TSH 0.111 (L) 07/13/2022    TSH 4.079 (H) 03/17/2022    FREET4 2.51 (H) 08/07/2022    FREET4 2.95 (H) 08/06/2022    FREET4 2.18 (H) 08/05/2022      Latest Reference Range & Units 08/07/22 03:42 08/08/22 03:10 08/09/22 03:29 08/10/22 03:33   Free T4 0.71 - 1.51 ng/dL 2.51 (H) 2.43 (H) 2.23 (H) 2.12 (H)       Interval HPI:   fT4 at 1.69  Blood sugars stable, at goal    BP (!) 70/0 (BP Location: Right arm, Patient Position: Lying)   Pulse 77   Temp 97.5 °F (36.4 °C) (Oral)   Resp (!) 22   Ht 6' 1" (1.854 m)   Wt 84.4 " kg (186 lb)   SpO2 99%   BMI 24.54 kg/m²     Labs Reviewed and Include    Recent Labs   Lab 08/13/22  0317   *   CALCIUM 9.6   ALBUMIN 2.3*   PROT 6.9      K 5.9*   CO2 17*   *   BUN 53*   CREATININE 1.4   ALKPHOS 195*   ALT 59*   AST 73*   BILITOT 2.9*     Lab Results   Component Value Date    WBC 29.81 (H) 08/13/2022    HGB 8.4 (L) 08/13/2022    HCT 29.9 (L) 08/13/2022     (H) 08/13/2022     08/13/2022     Recent Labs   Lab 08/12/22  0325 08/13/22  0317   FREET4 1.62* 1.69*     Lab Results   Component Value Date    HGBA1C 5.4 04/26/2021       Nutritional status:   Body mass index is 24.54 kg/m².  Lab Results   Component Value Date    ALBUMIN 2.3 (L) 08/13/2022    ALBUMIN 2.2 (L) 08/12/2022    ALBUMIN 2.2 (L) 08/12/2022     Lab Results   Component Value Date    PREALBUMIN 32 08/11/2022    PREALBUMIN 28 08/05/2022    PREALBUMIN 13 (L) 07/29/2022       Estimated Creatinine Clearance: 67.4 mL/min (based on SCr of 1.4 mg/dL).    Accu-Checks  Recent Labs     08/11/22  1306 08/11/22  1659 08/11/22  2104 08/11/22  2310 08/12/22  0857 08/12/22  1200 08/12/22  1950 08/12/22  2321 08/13/22  0316 08/13/22  0732   POCTGLUCOSE 172* 176* 157* 121* 117* 138* 150* 96 117* 135*       Current Medications and/or Treatments Impacting Glycemic Control  Immunotherapy:    Immunosuppressants       None          Steroids:   Hormones (From admission, onward)                Start     Stop Route Frequency Ordered    08/11/22 0900  predniSONE tablet 40 mg         -- PER NG TUBE Daily 08/10/22 0931    08/02/22 1931  melatonin tablet 6 mg         -- OG Nightly PRN 08/02/22 1832          Pressors:    Autonomic Drugs (From admission, onward)                Start     Stop Route Frequency Ordered    07/29/22 0011  EPINEPHrine (ADRENALIN) 1 mg/mL injection        Note to Pharmacy: Created by cabinet override    07/29 1214   07/29/22 0011          Hyperglycemia/Diabetes Medications:   Antihyperglycemics (From  admission, onward)                Start     Stop Route Frequency Ordered    08/11/22 0000  insulin aspart U-100 pen 4 Units         -- SubQ 6 times per day 08/10/22 2055    08/03/22 2100  insulin detemir U-100 pen 6 Units         -- SubQ Nightly 08/03/22 1900    07/30/22 1023  insulin aspart U-100 pen 0-5 Units         -- SubQ Every 4 hours PRN 07/30/22 0925            ASSESSMENT and PLAN    Amiodarone-induced hyperthyroidism  Key History and Diagnostic Findings  - History of AIT type 2 (TRAb and TSI negative; Thyroid US unremarkable with low vascularity)  - Weaned off methimazole and prednisone by May 2020, then developed hypothyroidism, Levothyroxine started and dose titrated per TFTs. Prior to current admission he was on Levothyroxine 112 mcg daily  - Labs consistent with hypothyroidism until current admission, initially on 7/13, with low TSH and elevated fT4. Repeat TFTs on 7/27 with worsening hyperthyroidism. He continued to receive LT4 112 mcg through 7/28. He remains on amiodarone for episodes of Ventricular Tachycardia  - Suspect current hyperthyroidism is likely due to AIT-2, however continued exogenous LT4 certainly contribute to current presentation   - Free T4 today at 1.69    Lab Results   Component Value Date    TSH <0.010 (L) 07/27/2022    TSH 0.111 (L) 07/13/2022    TSH 4.079 (H) 03/17/2022    FREET4 1.69 (H) 08/13/2022    FREET4 1.62 (H) 08/12/2022    FREET4 1.67 (H) 08/11/2022     Plan  - Strongly suspect AIT type 2 with improvement seen currently with the use of prednisone.   - Heparin can cause a false elevation in free T4 as it displaces free T4 from TBG; will follow-up direct dialysis result which is still pending from 8.5.22  - Continue Methimazole to 20 mg daily  - Continue Prednisone to 40 mg daily  - Continue checking daily free T4    Hyperglycemia  Key History and Diagnostic Findings  - Hyperglycemia noted once starting TPN in addition to steroids  - No prior history of diabetes; most  recent A1c of 5.4 on 04/26/2021  - Blood sugars at goal for now    Plan  - Continue levemir 6 units QHS  - Continue Aspart to 4 units every 4 hour with low correction while on Tube Feeds  - Please notify endocrine if any change in tube feeds as this will change insulin requirements.  - Please hold insulin if tube feeds are held for some reason  - Please ensure that accuchecks are being documented every 4 hours    Chronic combined systolic and diastolic heart failure  Managed per cardiology          Poncho Guajardo MD  Endocrinology  [facility]

## 2022-08-13 NOTE — SUBJECTIVE & OBJECTIVE
"  Family History       Problem Relation (Age of Onset)    Cancer Sister (54)    Coronary artery disease Father    Diabetes Father    Heart disease Father    Hypertension Father    No Known Problems Mother, Brother          Tobacco Use    Smoking status: Former Smoker     Packs/day: 1.00     Years: 31.00     Pack years: 31.00     Types: Cigarettes     Quit date: 2018     Years since quittin.5    Smokeless tobacco: Never Used    Tobacco comment: pt is quiting on his own - pt stated not qualified for program;  pt  quit on his own   Substance and Sexual Activity    Alcohol use: No     Alcohol/week: 0.0 standard drinks     Comment: quit    Drug use: No    Sexual activity: Yes     Partners: Female     Birth control/protection: None     Comment: 10/5/17  with same partner 7 years      Psychotherapeutics (From admission, onward)                Start     Stop Route Frequency Ordered    22 2100  mirtazapine tablet 15 mg         -- Oral Nightly 22 1121            Objective:     Vital Signs (Most Recent):  Temp: 97.7 °F (36.5 °C) (22 1100)  Pulse: 77 (22 1152)  Resp: (!) 29 (22 1152)  BP: (!) 72/0 (22 1100)  SpO2: 97 % (22 1100)   Vital Signs (24h Range):  Temp:  [97.5 °F (36.4 °C)-98.5 °F (36.9 °C)] 97.7 °F (36.5 °C)  Pulse:  [64-87] 77  Resp:  [16-31] 29  SpO2:  [97 %-99 %] 97 %  BP: (70-82)/(0) 72/0  Arterial Line BP: (74-99)/(59-87) 76/66     Height: 6' 1" (185.4 cm)  Weight: 84.4 kg (186 lb)  Body mass index is 24.54 kg/m².      Intake/Output Summary (Last 24 hours) at 2022 1200  Last data filed at 2022 1100  Gross per 24 hour   Intake 2053.79 ml   Output 1700 ml   Net 353.79 ml       Mental Status Exam:  Appearance: unremarkable, age appropriate  Behavior/Cooperation: normal, cooperative  Speech: normal tone, normal rate, normal volume  Mood: "pretty good"  Affect: constricted  Thought Process: normal and logical  Thought Content: normal, no SI/HI/AVH " reported  Orientation: grossly intact  Memory: Grossly intact  Attention Span/Concentration: Normal  Insight: fair  Judgment: fair     Significant Labs: Last 24 Hours:   Recent Lab Results  (Last 5 results in the past 24 hours)        08/13/22  1114   08/13/22  1100   08/13/22  0732   08/13/22  0317   08/13/22  0316        Albumin       2.3         Alkaline Phosphatase       195         ALT       59         Anion Gap   7     9         Aniso       Slight         aPTT   40.8  Comment: aPTT therapeutic range = 39-69 seconds     31.2  Comment: aPTT therapeutic range = 39-69 seconds         AST       73  Comment: *Result may be interfered by visible hemolysis         Baso #       0.07         Basophilic Stippling       Occasional         Basophil %       0.2         Bilirubin, Direct       1.6  Comment: *Result may be interfered by visible hemolysis         BILIRUBIN TOTAL       2.9  Comment: For infants and newborns, interpretation of results should be based  on gestational age, weight and in agreement with clinical  observations.    Premature Infant recommended reference ranges:  Up to 24 hours.............<8.0 mg/dL  Up to 48 hours............<12.0 mg/dL  3-5 days..................<15.0 mg/dL  6-29 days.................<15.0 mg/dL           BUN   53     53         Calcium   9.6     9.6         Chloride   116     113         CO2   15     17         Creatinine   1.6     1.4         Differential Method       Automated         eGFR   50.6     59.4         Eos #       0.0         Eosinophil %       0.1         Fibrinogen       506         Free T4       1.69         Glucose   168     118         Gran # (ANC)       26.0         Gran %       87.2         Hematocrit   27.3     29.9         Hemoglobin   7.3     8.4         Immature Grans (Abs)       1.43  Comment: Mild elevation in immature granulocytes is non specific and   can be seen in a variety of conditions including stress response,   acute inflammation, trauma and  pregnancy. Correlation with other   laboratory and clinical findings is essential.           Immature Granulocytes       4.8         INR   1.0  Comment: Coumadin Therapy:  2.0 - 3.0 for INR for all indicators except mechanical heart valves  and antiphospholipid syndromes which should use 2.5 - 3.5.       1.0  Comment: Coumadin Therapy:  2.0 - 3.0 for INR for all indicators except mechanical heart valves  and antiphospholipid syndromes which should use 2.5 - 3.5.           LD   275  Comment: Results are increased in hemolyzed samples.             Lymph #       0.9         Lymph %       2.9         Magnesium   2.8     2.9         MCH   27.9     28.5         MCHC   26.7     28.1         MCV   104     101         Mono #       1.4         Mono %       4.8         MPV   12.0     12.2         nRBC       0         Phosphorus   2.5     3.0         Platelet Estimate       Appears normal         Platelets   225     227         POCT Glucose 174     135     117       Poly       Occasional         Potassium   5.2     5.9  Comment: *Result may be interfered by visible hemolysis         PROTEIN TOTAL       6.9  Comment: *Result may be interfered by visible hemolysis         Protime   10.6     10.6         RBC   2.62     2.95         RDW   19.0     18.9         Sodium   138     139         WBC   32.04     29.81                                Significant Imaging: I have reviewed all pertinent imaging results/findings within the past 24 hours.

## 2022-08-14 PROBLEM — E87.5 HYPERKALEMIA: Status: ACTIVE | Noted: 2022-08-14

## 2022-08-14 LAB
ABO + RH BLD: NORMAL
ALBUMIN SERPL BCP-MCNC: 2 G/DL (ref 3.5–5.2)
ALBUMIN SERPL BCP-MCNC: 2.1 G/DL (ref 3.5–5.2)
ALLENS TEST: ABNORMAL
ALP SERPL-CCNC: 200 U/L (ref 55–135)
ALP SERPL-CCNC: 213 U/L (ref 55–135)
ALT SERPL W/O P-5'-P-CCNC: 58 U/L (ref 10–44)
ALT SERPL W/O P-5'-P-CCNC: 59 U/L (ref 10–44)
ANION GAP SERPL CALC-SCNC: 5 MMOL/L (ref 8–16)
ANION GAP SERPL CALC-SCNC: 6 MMOL/L (ref 8–16)
ANION GAP SERPL CALC-SCNC: 7 MMOL/L (ref 8–16)
ANION GAP SERPL CALC-SCNC: 8 MMOL/L (ref 8–16)
ANION GAP SERPL CALC-SCNC: 9 MMOL/L (ref 8–16)
ANISOCYTOSIS BLD QL SMEAR: SLIGHT
ANISOCYTOSIS BLD QL SMEAR: SLIGHT
APTT BLDCRRT: 41.3 SEC (ref 21–32)
AST SERPL-CCNC: 46 U/L (ref 10–40)
AST SERPL-CCNC: 59 U/L (ref 10–40)
BACTERIA BLD CULT: NORMAL
BACTERIA BLD CULT: NORMAL
BASOPHILS # BLD AUTO: 0.04 K/UL (ref 0–0.2)
BASOPHILS # BLD AUTO: 0.05 K/UL (ref 0–0.2)
BASOPHILS # BLD AUTO: ABNORMAL K/UL (ref 0–0.2)
BASOPHILS NFR BLD: 0 % (ref 0–1.9)
BASOPHILS NFR BLD: 0.2 % (ref 0–1.9)
BASOPHILS NFR BLD: 0.2 % (ref 0–1.9)
BILIRUB DIRECT SERPL-MCNC: 1.7 MG/DL (ref 0.1–0.3)
BILIRUB SERPL-MCNC: 2.5 MG/DL (ref 0.1–1)
BILIRUB SERPL-MCNC: 2.6 MG/DL (ref 0.1–1)
BLD GP AB SCN CELLS X3 SERPL QL: NORMAL
BUN SERPL-MCNC: 44 MG/DL (ref 6–20)
BUN SERPL-MCNC: 44 MG/DL (ref 6–20)
BUN SERPL-MCNC: 46 MG/DL (ref 6–20)
BUN SERPL-MCNC: 46 MG/DL (ref 6–20)
BUN SERPL-MCNC: 49 MG/DL (ref 6–20)
CALCIUM SERPL-MCNC: 8.9 MG/DL (ref 8.7–10.5)
CALCIUM SERPL-MCNC: 9 MG/DL (ref 8.7–10.5)
CALCIUM SERPL-MCNC: 9.1 MG/DL (ref 8.7–10.5)
CALCIUM SERPL-MCNC: 9.2 MG/DL (ref 8.7–10.5)
CALCIUM SERPL-MCNC: 9.3 MG/DL (ref 8.7–10.5)
CHLORIDE SERPL-SCNC: 113 MMOL/L (ref 95–110)
CHLORIDE SERPL-SCNC: 114 MMOL/L (ref 95–110)
CHLORIDE SERPL-SCNC: 116 MMOL/L (ref 95–110)
CHLORIDE SERPL-SCNC: 117 MMOL/L (ref 95–110)
CHLORIDE SERPL-SCNC: 117 MMOL/L (ref 95–110)
CO2 SERPL-SCNC: 15 MMOL/L (ref 23–29)
CO2 SERPL-SCNC: 16 MMOL/L (ref 23–29)
CO2 SERPL-SCNC: 16 MMOL/L (ref 23–29)
CO2 SERPL-SCNC: 17 MMOL/L (ref 23–29)
CO2 SERPL-SCNC: 17 MMOL/L (ref 23–29)
CREAT SERPL-MCNC: 1.1 MG/DL (ref 0.5–1.4)
CREAT SERPL-MCNC: 1.2 MG/DL (ref 0.5–1.4)
CREAT SERPL-MCNC: 1.2 MG/DL (ref 0.5–1.4)
CREAT SERPL-MCNC: 1.3 MG/DL (ref 0.5–1.4)
CREAT SERPL-MCNC: 1.3 MG/DL (ref 0.5–1.4)
DELSYS: ABNORMAL
DIFFERENTIAL METHOD: ABNORMAL
EOSINOPHIL # BLD AUTO: 0 K/UL (ref 0–0.5)
EOSINOPHIL # BLD AUTO: 0.1 K/UL (ref 0–0.5)
EOSINOPHIL # BLD AUTO: ABNORMAL K/UL (ref 0–0.5)
EOSINOPHIL NFR BLD: 0 % (ref 0–8)
EOSINOPHIL NFR BLD: 0 % (ref 0–8)
EOSINOPHIL NFR BLD: 0.3 % (ref 0–8)
ERYTHROCYTE [DISTWIDTH] IN BLOOD BY AUTOMATED COUNT: 18.9 % (ref 11.5–14.5)
ERYTHROCYTE [DISTWIDTH] IN BLOOD BY AUTOMATED COUNT: 18.9 % (ref 11.5–14.5)
ERYTHROCYTE [DISTWIDTH] IN BLOOD BY AUTOMATED COUNT: 19.3 % (ref 11.5–14.5)
EST. GFR  (NO RACE VARIABLE): >60 ML/MIN/1.73 M^2
FIBRINOGEN PPP-MCNC: 452 MG/DL (ref 182–400)
FIO2: 0.35
FLOW: 8
GLUCOSE SERPL-MCNC: 113 MG/DL (ref 70–110)
GLUCOSE SERPL-MCNC: 122 MG/DL (ref 70–110)
GLUCOSE SERPL-MCNC: 175 MG/DL (ref 70–110)
GLUCOSE SERPL-MCNC: 70 MG/DL (ref 70–110)
GLUCOSE SERPL-MCNC: 98 MG/DL (ref 70–110)
HCO3 UR-SCNC: 19.5 MMOL/L (ref 24–28)
HCO3 UR-SCNC: 20.8 MMOL/L (ref 24–28)
HCO3 UR-SCNC: 22.6 MMOL/L (ref 24–28)
HCT VFR BLD AUTO: 27.9 % (ref 40–54)
HCT VFR BLD AUTO: 29 % (ref 40–54)
HCT VFR BLD AUTO: 30 % (ref 40–54)
HCT VFR BLD CALC: 27 %PCV (ref 36–54)
HCT VFR BLD CALC: 28 %PCV (ref 36–54)
HGB BLD-MCNC: 7.9 G/DL (ref 14–18)
HGB BLD-MCNC: 8.2 G/DL (ref 14–18)
HGB BLD-MCNC: 8.4 G/DL (ref 14–18)
HYPOCHROMIA BLD QL SMEAR: ABNORMAL
IMM GRANULOCYTES # BLD AUTO: 1.15 K/UL (ref 0–0.04)
IMM GRANULOCYTES # BLD AUTO: 1.38 K/UL (ref 0–0.04)
IMM GRANULOCYTES # BLD AUTO: ABNORMAL K/UL (ref 0–0.04)
IMM GRANULOCYTES NFR BLD AUTO: 4.7 % (ref 0–0.5)
IMM GRANULOCYTES NFR BLD AUTO: 4.8 % (ref 0–0.5)
IMM GRANULOCYTES NFR BLD AUTO: ABNORMAL % (ref 0–0.5)
INR PPP: 1 (ref 0.8–1.2)
LYMPHOCYTES # BLD AUTO: 0.5 K/UL (ref 1–4.8)
LYMPHOCYTES # BLD AUTO: 0.7 K/UL (ref 1–4.8)
LYMPHOCYTES # BLD AUTO: ABNORMAL K/UL (ref 1–4.8)
LYMPHOCYTES NFR BLD: 0 % (ref 18–48)
LYMPHOCYTES NFR BLD: 2.1 % (ref 18–48)
LYMPHOCYTES NFR BLD: 2.3 % (ref 18–48)
MAGNESIUM SERPL-MCNC: 2.6 MG/DL (ref 1.6–2.6)
MAGNESIUM SERPL-MCNC: 2.6 MG/DL (ref 1.6–2.6)
MAGNESIUM SERPL-MCNC: 2.7 MG/DL (ref 1.6–2.6)
MCH RBC QN AUTO: 27.5 PG (ref 27–31)
MCH RBC QN AUTO: 27.9 PG (ref 27–31)
MCH RBC QN AUTO: 28.8 PG (ref 27–31)
MCHC RBC AUTO-ENTMCNC: 28 G/DL (ref 32–36)
MCHC RBC AUTO-ENTMCNC: 28.3 G/DL (ref 32–36)
MCHC RBC AUTO-ENTMCNC: 28.3 G/DL (ref 32–36)
MCV RBC AUTO: 102 FL (ref 82–98)
MCV RBC AUTO: 98 FL (ref 82–98)
MCV RBC AUTO: 99 FL (ref 82–98)
METAMYELOCYTES NFR BLD MANUAL: 2 %
MODE: ABNORMAL
MONOCYTES # BLD AUTO: 0.8 K/UL (ref 0.3–1)
MONOCYTES # BLD AUTO: 1.2 K/UL (ref 0.3–1)
MONOCYTES # BLD AUTO: ABNORMAL K/UL (ref 0.3–1)
MONOCYTES NFR BLD: 1 % (ref 4–15)
MONOCYTES NFR BLD: 3.2 % (ref 4–15)
MONOCYTES NFR BLD: 4.1 % (ref 4–15)
NEUTROPHILS # BLD AUTO: 21.3 K/UL (ref 1.8–7.7)
NEUTROPHILS # BLD AUTO: 25.8 K/UL (ref 1.8–7.7)
NEUTROPHILS NFR BLD: 88.4 % (ref 38–73)
NEUTROPHILS NFR BLD: 89.7 % (ref 38–73)
NEUTROPHILS NFR BLD: 94 % (ref 38–73)
NEUTS BAND NFR BLD MANUAL: 3 %
NRBC BLD-RTO: 0 /100 WBC
NRBC BLD-RTO: 0 /100 WBC
NRBC BLD-RTO: 1 /100 WBC
PCO2 BLDA: 40.6 MMHG (ref 35–45)
PCO2 BLDA: 41.5 MMHG (ref 35–45)
PCO2 BLDA: 45.4 MMHG (ref 35–45)
PH SMN: 7.29 [PH] (ref 7.35–7.45)
PH SMN: 7.31 [PH] (ref 7.35–7.45)
PH SMN: 7.31 [PH] (ref 7.35–7.45)
PHOSPHATE SERPL-MCNC: 2.8 MG/DL (ref 2.7–4.5)
PHOSPHATE SERPL-MCNC: 3.3 MG/DL (ref 2.7–4.5)
PHOSPHATE SERPL-MCNC: 3.3 MG/DL (ref 2.7–4.5)
PLATELET # BLD AUTO: 182 K/UL (ref 150–450)
PLATELET # BLD AUTO: 202 K/UL (ref 150–450)
PLATELET # BLD AUTO: 208 K/UL (ref 150–450)
PLATELET BLD QL SMEAR: ABNORMAL
PLATELET BLD QL SMEAR: ABNORMAL
PMV BLD AUTO: 11.8 FL (ref 9.2–12.9)
PMV BLD AUTO: 12.1 FL (ref 9.2–12.9)
PMV BLD AUTO: 12.2 FL (ref 9.2–12.9)
PO2 BLDA: 152 MMHG (ref 80–100)
PO2 BLDA: 89 MMHG (ref 80–100)
PO2 BLDA: 92 MMHG (ref 80–100)
POC BE: -4 MMOL/L
POC BE: -6 MMOL/L
POC BE: -7 MMOL/L
POC IONIZED CALCIUM: 1.45 MMOL/L (ref 1.06–1.42)
POC IONIZED CALCIUM: 1.46 MMOL/L (ref 1.06–1.42)
POC SATURATED O2: 96 % (ref 95–100)
POC SATURATED O2: 96 % (ref 95–100)
POC SATURATED O2: 99 % (ref 95–100)
POC TCO2: 21 MMOL/L (ref 23–27)
POC TCO2: 22 MMOL/L (ref 23–27)
POC TCO2: 24 MMOL/L (ref 23–27)
POCT GLUCOSE: 110 MG/DL (ref 70–110)
POCT GLUCOSE: 111 MG/DL (ref 70–110)
POCT GLUCOSE: 120 MG/DL (ref 70–110)
POCT GLUCOSE: 121 MG/DL (ref 70–110)
POCT GLUCOSE: 123 MG/DL (ref 70–110)
POCT GLUCOSE: 137 MG/DL (ref 70–110)
POCT GLUCOSE: 150 MG/DL (ref 70–110)
POCT GLUCOSE: 154 MG/DL (ref 70–110)
POCT GLUCOSE: 159 MG/DL (ref 70–110)
POCT GLUCOSE: 177 MG/DL (ref 70–110)
POCT GLUCOSE: 180 MG/DL (ref 70–110)
POCT GLUCOSE: 72 MG/DL (ref 70–110)
POCT GLUCOSE: 86 MG/DL (ref 70–110)
POCT GLUCOSE: 92 MG/DL (ref 70–110)
POCT GLUCOSE: 95 MG/DL (ref 70–110)
POLYCHROMASIA BLD QL SMEAR: ABNORMAL
POLYCHROMASIA BLD QL SMEAR: ABNORMAL
POTASSIUM BLD-SCNC: 5.3 MMOL/L (ref 3.5–5.1)
POTASSIUM BLD-SCNC: 5.4 MMOL/L (ref 3.5–5.1)
POTASSIUM SERPL-SCNC: 5.1 MMOL/L (ref 3.5–5.1)
POTASSIUM SERPL-SCNC: 5.3 MMOL/L (ref 3.5–5.1)
POTASSIUM SERPL-SCNC: 5.5 MMOL/L (ref 3.5–5.1)
POTASSIUM SERPL-SCNC: 5.9 MMOL/L (ref 3.5–5.1)
POTASSIUM SERPL-SCNC: 6 MMOL/L (ref 3.5–5.1)
PROT SERPL-MCNC: 5.9 G/DL (ref 6–8.4)
PROT SERPL-MCNC: 6.2 G/DL (ref 6–8.4)
PROTHROMBIN TIME: 10.5 SEC (ref 9–12.5)
PROTHROMBIN TIME: 10.5 SEC (ref 9–12.5)
PROTHROMBIN TIME: 10.8 SEC (ref 9–12.5)
RBC # BLD AUTO: 2.74 M/UL (ref 4.6–6.2)
RBC # BLD AUTO: 2.94 M/UL (ref 4.6–6.2)
RBC # BLD AUTO: 3.05 M/UL (ref 4.6–6.2)
SAMPLE: ABNORMAL
SCHISTOCYTES BLD QL SMEAR: ABNORMAL
SITE: ABNORMAL
SODIUM BLD-SCNC: 142 MMOL/L (ref 136–145)
SODIUM BLD-SCNC: 144 MMOL/L (ref 136–145)
SODIUM SERPL-SCNC: 137 MMOL/L (ref 136–145)
SODIUM SERPL-SCNC: 138 MMOL/L (ref 136–145)
SODIUM SERPL-SCNC: 138 MMOL/L (ref 136–145)
SODIUM SERPL-SCNC: 139 MMOL/L (ref 136–145)
SODIUM SERPL-SCNC: 141 MMOL/L (ref 136–145)
T3 SERPL-MCNC: <40 NG/DL (ref 60–180)
T4 FREE SERPL-MCNC: 1.39 NG/DL (ref 0.71–1.51)
T4 FREE SERPL-MCNC: 1.43 NG/DL (ref 0.71–1.51)
T4 SERPL-MCNC: 8.4 UG/DL (ref 4.5–11.5)
TSH SERPL DL<=0.005 MIU/L-ACNC: 0.13 UIU/ML (ref 0.4–4)
WBC # BLD AUTO: 23.72 K/UL (ref 3.9–12.7)
WBC # BLD AUTO: 26.49 K/UL (ref 3.9–12.7)
WBC # BLD AUTO: 29.17 K/UL (ref 3.9–12.7)
WBC TOXIC VACUOLES BLD QL SMEAR: PRESENT

## 2022-08-14 PROCEDURE — 83735 ASSAY OF MAGNESIUM: CPT | Mod: 91 | Performed by: STUDENT IN AN ORGANIZED HEALTH CARE EDUCATION/TRAINING PROGRAM

## 2022-08-14 PROCEDURE — 63600175 PHARM REV CODE 636 W HCPCS: Performed by: STUDENT IN AN ORGANIZED HEALTH CARE EDUCATION/TRAINING PROGRAM

## 2022-08-14 PROCEDURE — 93010 ELECTROCARDIOGRAM REPORT: CPT | Mod: 76,,, | Performed by: INTERNAL MEDICINE

## 2022-08-14 PROCEDURE — 82803 BLOOD GASES ANY COMBINATION: CPT

## 2022-08-14 PROCEDURE — 25000242 PHARM REV CODE 250 ALT 637 W/ HCPCS: Performed by: THORACIC SURGERY (CARDIOTHORACIC VASCULAR SURGERY)

## 2022-08-14 PROCEDURE — 99900026 HC AIRWAY MAINTENANCE (STAT)

## 2022-08-14 PROCEDURE — 37799 UNLISTED PX VASCULAR SURGERY: CPT

## 2022-08-14 PROCEDURE — 85025 COMPLETE CBC W/AUTO DIFF WBC: CPT | Performed by: THORACIC SURGERY (CARDIOTHORACIC VASCULAR SURGERY)

## 2022-08-14 PROCEDURE — 84436 ASSAY OF TOTAL THYROXINE: CPT | Performed by: THORACIC SURGERY (CARDIOTHORACIC VASCULAR SURGERY)

## 2022-08-14 PROCEDURE — 93750 INTERROGATION VAD IN PERSON: CPT | Mod: ,,, | Performed by: INTERNAL MEDICINE

## 2022-08-14 PROCEDURE — 25000003 PHARM REV CODE 250: Performed by: INTERNAL MEDICINE

## 2022-08-14 PROCEDURE — 94003 VENT MGMT INPAT SUBQ DAY: CPT

## 2022-08-14 PROCEDURE — 99291 CRITICAL CARE FIRST HOUR: CPT | Mod: ,,, | Performed by: INTERNAL MEDICINE

## 2022-08-14 PROCEDURE — 84100 ASSAY OF PHOSPHORUS: CPT | Mod: 91 | Performed by: THORACIC SURGERY (CARDIOTHORACIC VASCULAR SURGERY)

## 2022-08-14 PROCEDURE — 99233 PR SUBSEQUENT HOSPITAL CARE,LEVL III: ICD-10-PCS | Mod: ,,, | Performed by: PSYCHIATRY & NEUROLOGY

## 2022-08-14 PROCEDURE — 80048 BASIC METABOLIC PNL TOTAL CA: CPT | Mod: XB | Performed by: THORACIC SURGERY (CARDIOTHORACIC VASCULAR SURGERY)

## 2022-08-14 PROCEDURE — 99900035 HC TECH TIME PER 15 MIN (STAT)

## 2022-08-14 PROCEDURE — 84443 ASSAY THYROID STIM HORMONE: CPT | Performed by: THORACIC SURGERY (CARDIOTHORACIC VASCULAR SURGERY)

## 2022-08-14 PROCEDURE — 25000003 PHARM REV CODE 250: Performed by: STUDENT IN AN ORGANIZED HEALTH CARE EDUCATION/TRAINING PROGRAM

## 2022-08-14 PROCEDURE — 27200966 HC CLOSED SUCTION SYSTEM

## 2022-08-14 PROCEDURE — 99233 SBSQ HOSP IP/OBS HIGH 50: CPT | Mod: ,,, | Performed by: PSYCHIATRY & NEUROLOGY

## 2022-08-14 PROCEDURE — 86850 RBC ANTIBODY SCREEN: CPT | Performed by: INTERNAL MEDICINE

## 2022-08-14 PROCEDURE — 27000248 HC VAD-ADDITIONAL DAY

## 2022-08-14 PROCEDURE — 94761 N-INVAS EAR/PLS OXIMETRY MLT: CPT

## 2022-08-14 PROCEDURE — 84480 ASSAY TRIIODOTHYRONINE (T3): CPT | Performed by: THORACIC SURGERY (CARDIOTHORACIC VASCULAR SURGERY)

## 2022-08-14 PROCEDURE — 80076 HEPATIC FUNCTION PANEL: CPT | Performed by: THORACIC SURGERY (CARDIOTHORACIC VASCULAR SURGERY)

## 2022-08-14 PROCEDURE — 80048 BASIC METABOLIC PNL TOTAL CA: CPT | Mod: 91,XB | Performed by: STUDENT IN AN ORGANIZED HEALTH CARE EDUCATION/TRAINING PROGRAM

## 2022-08-14 PROCEDURE — 94640 AIRWAY INHALATION TREATMENT: CPT

## 2022-08-14 PROCEDURE — 85610 PROTHROMBIN TIME: CPT | Mod: 91 | Performed by: THORACIC SURGERY (CARDIOTHORACIC VASCULAR SURGERY)

## 2022-08-14 PROCEDURE — 25000242 PHARM REV CODE 250 ALT 637 W/ HCPCS

## 2022-08-14 PROCEDURE — 85384 FIBRINOGEN ACTIVITY: CPT | Performed by: THORACIC SURGERY (CARDIOTHORACIC VASCULAR SURGERY)

## 2022-08-14 PROCEDURE — 84439 ASSAY OF FREE THYROXINE: CPT | Performed by: THORACIC SURGERY (CARDIOTHORACIC VASCULAR SURGERY)

## 2022-08-14 PROCEDURE — 85610 PROTHROMBIN TIME: CPT | Performed by: THORACIC SURGERY (CARDIOTHORACIC VASCULAR SURGERY)

## 2022-08-14 PROCEDURE — 93005 ELECTROCARDIOGRAM TRACING: CPT

## 2022-08-14 PROCEDURE — 63600175 PHARM REV CODE 636 W HCPCS: Performed by: INTERNAL MEDICINE

## 2022-08-14 PROCEDURE — 85027 COMPLETE CBC AUTOMATED: CPT | Performed by: THORACIC SURGERY (CARDIOTHORACIC VASCULAR SURGERY)

## 2022-08-14 PROCEDURE — 84100 ASSAY OF PHOSPHORUS: CPT | Mod: 91 | Performed by: STUDENT IN AN ORGANIZED HEALTH CARE EDUCATION/TRAINING PROGRAM

## 2022-08-14 PROCEDURE — 99291 PR CRITICAL CARE, E/M 30-74 MINUTES: ICD-10-PCS | Mod: ,,, | Performed by: INTERNAL MEDICINE

## 2022-08-14 PROCEDURE — 93010 EKG 12-LEAD: ICD-10-PCS | Mod: ,,, | Performed by: INTERNAL MEDICINE

## 2022-08-14 PROCEDURE — 83735 ASSAY OF MAGNESIUM: CPT | Mod: 91 | Performed by: THORACIC SURGERY (CARDIOTHORACIC VASCULAR SURGERY)

## 2022-08-14 PROCEDURE — 93750 PR INTERROGATE VENT ASSIST DEV, IN PERSON, W PHYSICIAN ANALYSIS: ICD-10-PCS | Mod: ,,, | Performed by: INTERNAL MEDICINE

## 2022-08-14 PROCEDURE — 20000000 HC ICU ROOM

## 2022-08-14 PROCEDURE — 27000221 HC OXYGEN, UP TO 24 HOURS

## 2022-08-14 PROCEDURE — 94664 DEMO&/EVAL PT USE INHALER: CPT

## 2022-08-14 PROCEDURE — 25000003 PHARM REV CODE 250

## 2022-08-14 PROCEDURE — 85007 BL SMEAR W/DIFF WBC COUNT: CPT | Performed by: THORACIC SURGERY (CARDIOTHORACIC VASCULAR SURGERY)

## 2022-08-14 PROCEDURE — 85730 THROMBOPLASTIN TIME PARTIAL: CPT | Performed by: STUDENT IN AN ORGANIZED HEALTH CARE EDUCATION/TRAINING PROGRAM

## 2022-08-14 PROCEDURE — 93010 ELECTROCARDIOGRAM REPORT: CPT | Mod: ,,, | Performed by: INTERNAL MEDICINE

## 2022-08-14 PROCEDURE — 80053 COMPREHEN METABOLIC PANEL: CPT | Performed by: INTERNAL MEDICINE

## 2022-08-14 PROCEDURE — 25000003 PHARM REV CODE 250: Performed by: PHYSICIAN ASSISTANT

## 2022-08-14 RX ORDER — ALPRAZOLAM 0.5 MG/1
1 TABLET ORAL DAILY PRN
Status: DISCONTINUED | OUTPATIENT
Start: 2022-08-14 | End: 2022-08-23

## 2022-08-14 RX ORDER — DEXTROSE MONOHYDRATE 100 MG/ML
25 INJECTION, SOLUTION INTRAVENOUS ONCE
Status: DISCONTINUED | OUTPATIENT
Start: 2022-08-14 | End: 2022-08-19

## 2022-08-14 RX ORDER — METHIMAZOLE 10 MG/1
10 TABLET ORAL DAILY
Status: DISCONTINUED | OUTPATIENT
Start: 2022-08-15 | End: 2022-08-15

## 2022-08-14 RX ORDER — BUSPIRONE HYDROCHLORIDE 5 MG/1
5 TABLET ORAL 3 TIMES DAILY
Status: DISCONTINUED | OUTPATIENT
Start: 2022-08-14 | End: 2022-08-18

## 2022-08-14 RX ORDER — NITROGLYCERIN 0.4 MG/1
TABLET SUBLINGUAL
Status: DISPENSED
Start: 2022-08-14 | End: 2022-08-14

## 2022-08-14 RX ORDER — CALCIUM GLUCONATE 20 MG/ML
1 INJECTION, SOLUTION INTRAVENOUS EVERY 10 MIN PRN
Status: DISCONTINUED | OUTPATIENT
Start: 2022-08-14 | End: 2022-08-19

## 2022-08-14 RX ORDER — ONDANSETRON 2 MG/ML
4 INJECTION INTRAMUSCULAR; INTRAVENOUS ONCE
Status: COMPLETED | OUTPATIENT
Start: 2022-08-14 | End: 2022-08-14

## 2022-08-14 RX ORDER — CALCIUM GLUCONATE 20 MG/ML
1 INJECTION, SOLUTION INTRAVENOUS ONCE
Status: COMPLETED | OUTPATIENT
Start: 2022-08-14 | End: 2022-08-14

## 2022-08-14 RX ORDER — NITROGLYCERIN 0.4 MG/1
0.4 TABLET SUBLINGUAL EVERY 5 MIN PRN
Status: DISCONTINUED | OUTPATIENT
Start: 2022-08-14 | End: 2022-08-19

## 2022-08-14 RX ORDER — DEXTROSE MONOHYDRATE 100 MG/ML
25 INJECTION, SOLUTION INTRAVENOUS
Status: ACTIVE | OUTPATIENT
Start: 2022-08-14 | End: 2022-08-15

## 2022-08-14 RX ADMIN — HEPARIN SODIUM 23 UNITS/KG/HR: 5000 INJECTION INTRAVENOUS; SUBCUTANEOUS at 10:08

## 2022-08-14 RX ADMIN — MEROPENEM 2 G: 1 INJECTION INTRAVENOUS at 02:08

## 2022-08-14 RX ADMIN — CALCIUM GLUCONATE 1 G: 20 INJECTION, SOLUTION INTRAVENOUS at 08:08

## 2022-08-14 RX ADMIN — MEROPENEM 2 G: 1 INJECTION INTRAVENOUS at 09:08

## 2022-08-14 RX ADMIN — INSULIN HUMAN 10 UNITS: 100 INJECTION, SOLUTION PARENTERAL at 08:08

## 2022-08-14 RX ADMIN — CHOLESTYRAMINE 4 G: 4 POWDER, FOR SUSPENSION ORAL at 08:08

## 2022-08-14 RX ADMIN — ACETYLCYSTEINE 4 ML: 100 SOLUTION ORAL; RESPIRATORY (INHALATION) at 07:08

## 2022-08-14 RX ADMIN — LEVALBUTEROL HYDROCHLORIDE 0.63 MG: 0.63 SOLUTION RESPIRATORY (INHALATION) at 12:08

## 2022-08-14 RX ADMIN — ACETYLCYSTEINE 4 ML: 100 SOLUTION ORAL; RESPIRATORY (INHALATION) at 12:08

## 2022-08-14 RX ADMIN — Medication 400 MG: at 08:08

## 2022-08-14 RX ADMIN — MEXILETINE HYDROCHLORIDE 400 MG: 200 CAPSULE ORAL at 09:08

## 2022-08-14 RX ADMIN — CHOLESTYRAMINE 4 G: 4 POWDER, FOR SUSPENSION ORAL at 01:08

## 2022-08-14 RX ADMIN — LEVALBUTEROL HYDROCHLORIDE 0.63 MG: 0.63 SOLUTION RESPIRATORY (INHALATION) at 07:08

## 2022-08-14 RX ADMIN — ASPIRIN 81 MG CHEWABLE TABLET 81 MG: 81 TABLET CHEWABLE at 08:08

## 2022-08-14 RX ADMIN — BUSPIRONE HYDROCHLORIDE 5 MG: 5 TABLET ORAL at 02:08

## 2022-08-14 RX ADMIN — DOCUSATE SODIUM 100 MG: 50 LIQUID ORAL at 08:08

## 2022-08-14 RX ADMIN — INSULIN ASPART 4 UNITS: 100 INJECTION, SOLUTION INTRAVENOUS; SUBCUTANEOUS at 07:08

## 2022-08-14 RX ADMIN — METHIMAZOLE 20 MG: 10 TABLET ORAL at 08:08

## 2022-08-14 RX ADMIN — ONDANSETRON 4 MG: 2 INJECTION INTRAMUSCULAR; INTRAVENOUS at 12:08

## 2022-08-14 RX ADMIN — CHOLESTYRAMINE 4 G: 4 POWDER, FOR SUSPENSION ORAL at 05:08

## 2022-08-14 RX ADMIN — MIRTAZAPINE 15 MG: 15 TABLET, FILM COATED ORAL at 08:08

## 2022-08-14 RX ADMIN — INSULIN ASPART 4 UNITS: 100 INJECTION, SOLUTION INTRAVENOUS; SUBCUTANEOUS at 11:08

## 2022-08-14 RX ADMIN — MEXILETINE HYDROCHLORIDE 400 MG: 200 CAPSULE ORAL at 02:08

## 2022-08-14 RX ADMIN — ACETYLCYSTEINE 4 ML: 100 SOLUTION ORAL; RESPIRATORY (INHALATION) at 01:08

## 2022-08-14 RX ADMIN — BUSPIRONE HYDROCHLORIDE 5 MG: 5 TABLET ORAL at 08:08

## 2022-08-14 RX ADMIN — PANTOPRAZOLE SODIUM 40 MG: 40 GRANULE, DELAYED RELEASE ORAL at 08:08

## 2022-08-14 RX ADMIN — POLYETHYLENE GLYCOL 3350 17 G: 17 POWDER, FOR SOLUTION ORAL at 08:08

## 2022-08-14 RX ADMIN — AMIODARONE HYDROCHLORIDE 400 MG: 200 TABLET ORAL at 08:08

## 2022-08-14 RX ADMIN — LEVALBUTEROL HYDROCHLORIDE 0.63 MG: 0.63 SOLUTION RESPIRATORY (INHALATION) at 01:08

## 2022-08-14 RX ADMIN — PREDNISONE 40 MG: 20 TABLET ORAL at 08:08

## 2022-08-14 RX ADMIN — MEROPENEM 2 G: 1 INJECTION INTRAVENOUS at 05:08

## 2022-08-14 RX ADMIN — MEXILETINE HYDROCHLORIDE 400 MG: 200 CAPSULE ORAL at 05:08

## 2022-08-14 RX ADMIN — HEPARIN SODIUM 23 UNITS/KG/HR: 5000 INJECTION INTRAVENOUS; SUBCUTANEOUS at 08:08

## 2022-08-14 RX ADMIN — INSULIN ASPART 4 UNITS: 100 INJECTION, SOLUTION INTRAVENOUS; SUBCUTANEOUS at 03:08

## 2022-08-14 RX ADMIN — DEXTROSE 250 ML: 10 SOLUTION INTRAVENOUS at 07:08

## 2022-08-14 NOTE — SUBJECTIVE & OBJECTIVE
Interval History: No issues overnight. He had no complaints this am. He was encouraged  to continue towards improvement and was reassured. He saw psychiatry yesterday who recommend antidepressant/anti-anxiolytic therapy. Pt started on buspar and xanax prn as recommended.    Continuous Infusions:   dextrose 10 % in water (D10W)      heparin (porcine) in D5W 23 Units/kg/hr (08/14/22 1200)     Scheduled Meds:   acetylcysteine 100 mg/ml (10%)  4 mL Nebulization Q6H    amiodarone  400 mg Oral Daily    aspirin  81 mg Per OG tube Daily    cholestyramine  1 packet Per NG tube QID    docusate  100 mg Per NG tube Daily    insulin aspart U-100  4 Units Subcutaneous 6 times per day    insulin detemir U-100  6 Units Subcutaneous QHS    levalbuterol  0.63 mg Nebulization Q6H    magnesium oxide  400 mg Oral BID    meropenem (MERREM) IVPB  2 g Intravenous Q8H    [START ON 8/15/2022] methIMAzole  10 mg Per NG tube Daily    mexiletine  400 mg Oral Q8H    mirtazapine  15 mg Oral QHS    ondansetron  4 mg Intravenous Once    pantoprazole  40 mg Per NG tube Daily    polyethylene glycol  17 g Per NG tube Daily    predniSONE  40 mg Per NG tube Daily     PRN Meds:sodium chloride 0.9%, sodium chloride 0.9%, acetaminophen, artificial tears, barium sulfate, barium sulfate, bisacodyL, [COMPLETED] calcium gluconate IVPB **AND** calcium gluconate IVPB, dextrose 10 % in water (D10W), dextrose 10%, dextrose 10%, glucagon (human recombinant), glucose, guaiFENesin 100 mg/5 ml, HYDROmorphone, HYDROmorphone, hydrOXYzine HCL, insulin aspart U-100, melatonin, nitroGLYCERIN    Review of patient's allergies indicates:   Allergen Reactions    Lisinopril Anaphylaxis    Hydralazine analogues      Chronic constipation, impotence, dizziness     Objective:     Vital Signs (Most Recent):  Temp: 97.5 °F (36.4 °C) (08/14/22 1100)  Pulse: 75 (08/14/22 1215)  Resp: (!) 36 (08/14/22 1215)  BP: (!) 66/0 (08/14/22 0730)  SpO2: 98 % (08/14/22 1100)   Vital Signs (24h  Range):  Temp:  [97.5 °F (36.4 °C)-98.1 °F (36.7 °C)] 97.5 °F (36.4 °C)  Pulse:  [48-85] 75  Resp:  [10-44] 36  SpO2:  [94 %-100 %] 98 %  BP: (66-82)/(0) 66/0  Arterial Line BP: ()/() 89/78     Patient Vitals for the past 72 hrs (Last 3 readings):   Weight   08/13/22 0600 84.4 kg (186 lb)       Body mass index is 24.54 kg/m².      Intake/Output Summary (Last 24 hours) at 8/14/2022 1222  Last data filed at 8/14/2022 1200  Gross per 24 hour   Intake 2625.92 ml   Output 1625 ml   Net 1000.92 ml         Hemodynamic Parameters:       Physical Exam  Vitals and nursing note reviewed.   Constitutional:       General: He is not in acute distress.     Appearance: Normal appearance. He is normal weight.      Comments: NGT in place (feeding)   HENT:      Head: Normocephalic.      Mouth/Throat:      Mouth: Mucous membranes are moist.   Cardiovascular:      Rate and Rhythm: Normal rate.      Heart sounds: Murmur heard.   Pulmonary:      Effort: Pulmonary effort is normal. No respiratory distress.      Breath sounds: Normal breath sounds. No wheezing or rales.   Abdominal:      General: Bowel sounds are normal. There is no distension.      Palpations: Abdomen is soft.      Tenderness: There is no abdominal tenderness.   Musculoskeletal:         General: No swelling.      Cervical back: Neck supple.   Skin:     General: Skin is warm.      Capillary Refill: Capillary refill takes less than 2 seconds.   Neurological:      General: No focal deficit present.      Mental Status: He is alert.      Motor: Weakness present.   Psychiatric:         Mood and Affect: Mood normal.       Significant Labs:  CBC:  Recent Labs   Lab 08/13/22  1946 08/14/22  0242 08/14/22  1148   WBC 27.47* 26.49* 29.17*   RBC 2.80* 2.74* 3.05*   HGB 8.0* 7.9* 8.4*   HCT 28.7* 27.9* 30.0*    202 208   * 102* 98   MCH 28.6 28.8 27.5   MCHC 27.9* 28.3* 28.0*       BNP:  Recent Labs   Lab 08/08/22  0310 08/10/22  0333 08/12/22  0325   *  216* 230*       CMP:  Recent Labs   Lab 08/12/22  1949 08/12/22  2321 08/13/22  0317 08/13/22  1100 08/13/22  1708 08/14/22  0005 08/14/22  0242   *   < > 118*   < > 162* 98 70   CALCIUM 9.3   < > 9.6   < > 9.5 9.2 9.1   ALBUMIN 2.2*  --  2.3*  --   --   --  2.1*   PROT 6.1  --  6.9  --   --   --  6.2      < > 139   < > 138 137 141   K 6.2*   < > 5.9*   < > 6.0* 5.9* 5.5*   CO2 16*   < > 17*   < > 15* 16* 17*   *   < > 113*   < > 113* 113* 117*   BUN 54*   < > 53*   < > 52* 49* 44*   CREATININE 1.5*   < > 1.4   < > 1.3 1.3 1.3   ALKPHOS 205*  --  195*  --   --   --  200*   ALT 61*  --  59*  --   --   --  59*   AST 51*  --  73*  --   --   --  59*   BILITOT 3.1*  --  2.9*  --   --   --  2.6*    < > = values in this interval not displayed.        Coagulation:   Recent Labs   Lab 08/13/22  1100 08/13/22  1708 08/13/22  1946 08/14/22  0242 08/14/22  1148   INR 1.0  --  1.0 1.0 1.0   APTT 40.8* 42.7*  --  41.3*  --        LDH:  Recent Labs   Lab 08/12/22  0509 08/13/22  1100    275*       Microbiology:  Microbiology Results (last 7 days)       Procedure Component Value Units Date/Time    Blood culture [[id number]] Collected: 08/09/22 1102    Order Status: Completed Specimen: Blood from Peripheral, Antecubital, Left Updated: 08/14/22 1212     Blood Culture, Routine No growth after 5 days.    Blood culture [[id number]] Collected: 08/09/22 1045    Order Status: Completed Specimen: Blood from Peripheral, Hand, Right Updated: 08/14/22 1212     Blood Culture, Routine No growth after 5 days.    Aerobic culture [[id number]] Collected: 08/09/22 1347    Order Status: Completed Specimen: Incision site from Abdomen Updated: 08/12/22 1120     Aerobic Bacterial Culture No growth    Narrative:      DL site.    Culture, Respiratory with Gram Stain [[id number]] Collected: 08/09/22 1026    Order Status: Completed Specimen: Respiratory from Endotracheal Aspirate Updated: 08/11/22 1051     Respiratory Culture Normal  respiratory sachin      No S aureus or Pseudomonas isolated.     Gram Stain (Respiratory) <10 epithelial cells per low power field.     Gram Stain (Respiratory) Rare WBC's     Gram Stain (Respiratory) No organisms seen    Culture, Anaerobe [[id number]] Collected: 08/09/22 1347    Order Status: Completed Specimen: Incision site from Abdomen Updated: 08/11/22 0925     Anaerobic Culture Culture in progress    Narrative:      DL site            Estimated Creatinine Clearance: 72.6 mL/min (based on SCr of 1.3 mg/dL).

## 2022-08-14 NOTE — PLAN OF CARE
SICU PLAN OF CARE NOTE     Dx: Left ventricular assist device (LVAD) complication     Shift Events: Zofran x1 for emesis episode. Pt remained on vent x4h overnight. K 6 shifted overnight with insulin and D10, now down to 5.5. See nursing notes for detailed events.     Gtts: Heparin      Neuro: AAO x4, Follows Commands and Moves All Extremities     Cardiac: NSR 70s-80s     Respiratory: trach collar 8LPM 40%     GI: Tube Feeds @ 60cc/hr (goal)     : Voids Spontaneously 800 cc/shift     VAD  Flows: 5.6-5.7, Speed: 6300, PI: 1.7-2.1, LSL 4900.      Labs/Accuchecks: q8h labs, accuchecks q4h     Skin: No new skin breakdown noted, pt turned and repositioned throughout shift. Immerse bed in use, bed plugged in, wheels locked, call light in reach. Foams in place, pillows in use.

## 2022-08-14 NOTE — NURSING
"0415 Pt alarming. Upon entering room, pt endorsing chest pain, stating chest pain feels like "pressure" and "feels different". MAPs upper 50's- low 60's, VAD flow 5.6-5.7. Inez CHAMBERLAIN notified. Orders for STAT EKG and SL nitro.     0425 EKG shows no changes from prior EKGs. RT at bedside to switch patient back to trach collar per request. MAP 68, VAD flow 5.7. Per pt, chest pain & pressure resolved. Nitro not given. Inez CHAMBERLAIN aware. Will continue to monitor.   "

## 2022-08-14 NOTE — RESPIRATORY THERAPY
Pt. Became tired and requested to go back on vent. Placed pt on ac vc+ 16/550/0.90/40%/+5 pt requested to increase respiratory rate. Respiratory rate increased , will continue to monitor

## 2022-08-14 NOTE — PLAN OF CARE
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   - History of AIT type 2 (TRAb and TSI negative; Thyroid US unremarkable with low vascularity).  - Heparin can cause a false elevation in free T4 as it displaces free T4 from TBG; will follow-up direct dialysis result which is still pending  - Decrease Methimazole to 10 mg daily  - Continue Prednisone to 40 mg daily  - Continue checking daily free T4, it has normalized today     Hyperglycemia  - Hyperglycemia noted once starting TPN in addition to steroids  - No prior history of diabetes; most recent A1c of 5.4 on 04/26/2021  - Blood sugars at goal      Plan  - Continue levemir 6 units nightly  - Continue Aspart to 4 units every 4 hour with low correction while on Tube Feeds  - Please notify endocrine if any change in tube feeds as this will change insulin requirements.  - Please hold insulin if tube feeds are held for some reason  - Please ensure that accuchecks are being documented every 4 hours

## 2022-08-14 NOTE — PROGRESS NOTES
08/14/2022  Casper Harris    Current provider:  Isaiah Santizo MD    Device interrogation:  TXP LVAD INTERROGATIONS 8/14/2022 8/14/2022 8/14/2022 8/14/2022 8/14/2022 8/14/2022 8/14/2022   Type HeartMate3 HeartMate3 HeartMate3 HeartMate3 HeartMate3 HeartMate3 HeartMate3   Flow 5.6 5.6 5.6 5.5 5.5 5.5 5.4   Speed 6300 6300 6300 6300 6300 6300 6300   PI 3.0 3.0 2.1 3.3 3.0 3.5 3.1   Power ([place]) 5.4 5.3 5.3 5.3 5.3 5.3 5.3   LSL 5900 5900 5900 5900 5900 5900 5900   Low Flow Alarm - - - - - - -   Pulsatility - - - - - - -          Rounded on Tim Richards to ensure all mechanical assist device settings (IABP or VAD) were appropriate and all parameters were within limits.  I was able to ensure all back up equipment was present, the staff had no issues, and the Perfusion Department daily rounding was complete.      For implantable VADs: Interrogation of Ventricular assist device was performed with analysis of device parameters and review of device function. I have personally reviewed the interrogation findings and agree with findings as stated.     In emergency, the nursing units have been notified to contact the perfusion department either by:  Calling [phone] from 630am to 4pm Mon thru Fri, utilizing the On-Call Finder functionality of Epic and searching for Perfusion, or by contacting the hospital  from 4pm to 630am and on weekends and asking to speak with the perfusionist on call.    5:44 PM

## 2022-08-14 NOTE — CARE UPDATE
HTS update    K 6.0  Will shift with insulin 10u IV and d50 amp  Will give calcium gluconate 1 g  Avoiding lokelma with constipation  Repeat BMP in six hours

## 2022-08-14 NOTE — NURSING
0020 Pt requesting to be placed on vent. RR 20-30's and labored breathing noted. Intermittent pulse ox applied, O2 sat 97%. RT notified.     0030 During oral care, RN called into room by RT that pt is actively vomiting. Oral suction provided per RT. Tube feeds turned off and NGT placed to suction. Zofran administered x1. Intermittent pulse ox applied, O2 sat 100%. Inez CHAMBERLAIN aware.     0050 Pt appears to be resting comfortably & in sync with vent. Will continue to monitor.

## 2022-08-14 NOTE — PROGRESS NOTES
[facility]  Psychiatry  Progress Note    Patient Name: Tim Richards  MRN: [id number]   Code Status: Full Code  Admission Date: 6/27/2022  Hospital Length of Stay: 48 days  Expected Discharge Date: 8/31/2022  Attending Physician: Isaiah Santizo MD  Primary Care Provider: Deyanira Booth MD    Current Legal Status: Uncontested    Patient information was obtained from ER records and primary team.       Subjective:     Patient is a 56 y.o., male, presents with:    Principal Problem:Left ventricular assist device (LVAD) complication    Chief Complaint: None    HPI:   Tim Richards is a 56 y.o. male with a past psychiatric history of generalized anxiety disorder, adjustment disorder, and insomnia who presented to [facility] due to Left ventricular assist device (LVAD) complication. Psychiatry was consulted for depression.    Per Primary Team:  56-yo M with hx of Stage D HFrEF (NICMP, EF 10%, previously NYHA II), s/p  HM2 implanted in march/2018, SC ICD, VT on amiodarone and mexiletine and amiodarone induced hypothyroidism. Pt refers that he was in his usual state until 3-4 days ago when he visited a family member's house and had dietary indiscretions. Refers that since then he has had low appetite. Also states today he started to notice a dark urine and increasing SOB so he decided to come to the ED. He denies palpitaitons, ICD shocks, but refers some chest tightness.   He also refers chills and soft stools for the past 2 days.  Denied episodes of bleeding.     Per 7/11 Psychiatry Consult:  On interview with resident, patient was somewhat guarded as he had already talked with the attending psychiatrist who is his outpatient provider. He did complain of poor sleep, which he attributed to not being on his home medication and being woken up throughout the night by hospital staff. Mentioned some anxiety over his upcoming surgery. Denied any depressed mood. Patient denied a history of  anxiety and reported that he is unsure why he takes Xanax. He denied any past psychiatric hospitalizations and any other psychiatric diagnoses other than insomnia. Patient did mention that he follows with Dr. Krueger and that he plans to continue receiving outpatient care with him after discharge.     Per 8/13 Psychiatry Consult:  Patient seen resting in bed comfortably, states that he has been feeling depressed with being stuck in bed 24/7 as he has been hospitalized since 6/27. Voices that he has some interest in starting on some medications for depressed mood at this time. Denies being on any psychiatric medications for mood besides some xanax by Dr. Krueger. He has been feeling disappointed after he was unable to pass the barium swallow. Denies SI, HI, AVH.    Psychiatric Review of Systems-is patient experiencing or having changes in  sleep: yes  appetite: no  weight: no  energy/anergy: yes  interest/pleasure/anhedonia: no  somatic symptoms: no  libido: did not assess  anxiety/panic: yes  guilty/hopelessness: yes  concentration: no  S.I.B.s/risky behavior: no  any drugs: no  alcohol: no     Allergies:  Lisinopril and Hydralazine analogues    Past Medical/Surgical History:  Past Medical History:   Diagnosis Date    Anticoagulant long-term use     CHF (congestive heart failure)     Class 1 obesity due to excess calories with serious comorbidity and body mass index (BMI) of 31.0 to 31.9 in adult     Dilated cardiomyopathy 1/10/2018    Disorder of kidney and ureter     CKD    Encounter for blood transfusion     Gout     HTN (hypertension)     Hx of psychiatric care     ICD (implantable cardioverter-defibrillator) infection 7/1/2020    Psychiatric problem     Thyroid disease     Ventricular tachycardia (paroxysmal)      Past Surgical History:   Procedure Laterality Date    CARDIAC CATHETERIZATION  Dec. 2012    CARDIAC DEFIBRILLATOR PLACEMENT Left     CRRT-D    COLONOSCOPY N/A 3/6/2018     Procedure: COLONOSCOPY;  Surgeon: Alonso Bone MD;  Location: [facility] ENDO (2ND FLR);  Service: Endoscopy;  Laterality: N/A;    COLONOSCOPY N/A 7/17/2019    Procedure: COLONOSCOPY;  Surgeon: Blane Valdez MD;  Location: [facility] ENDO (2ND FLR);  Service: Endoscopy;  Laterality: N/A;    COLONOSCOPY N/A 7/18/2019    Procedure: COLONOSCOPY;  Surgeon: Blane Valdez MD;  Location: [facility] ENDO (2ND FLR);  Service: Endoscopy;  Laterality: N/A;    ESOPHAGOGASTRODUODENOSCOPY N/A 7/17/2019    Procedure: EGD (ESOPHAGOGASTRODUODENOSCOPY);  Surgeon: Blane Valdez MD;  Location: [facility] ENDO (2ND FLR);  Service: Endoscopy;  Laterality: N/A;    ESOPHAGOGASTRODUODENOSCOPY N/A 7/18/2019    Procedure: EGD (ESOPHAGOGASTRODUODENOSCOPY);  Surgeon: Blane Valdez MD;  Location: [facility] ENDO (2ND FLR);  Service: Endoscopy;  Laterality: N/A;    NONINVASIVE CARDIAC ELECTROPHYSIOLOGY STUDY N/A 10/18/2019    Procedure: CARDIAC ELECTROPHYSIOLOGY STUDY, NONINVASIVE;  Surgeon: Raz Wagner MD;  Location: [facility] EP LAB;  Service: Cardiology;  Laterality: N/A;  VT, DFTs, MDT CRTD in situ, LVAD, LASHELL pizarro, 3098    REPLACEMENT OF IMPLANTABLE CARDIOVERTER-DEFIBRILLATOR (ICD) GENERATOR N/A 3/9/2020    Procedure: REPLACEMENT, ICD GENERATOR;  Surgeon: Harry Yun MD;  Location: [facility] EP LAB;  Service: Cardiology;  Laterality: N/A;  VT, ICD Gen Change and Lead Revision, MDT, MAC, DM,3 Prep    REVISION OF IMPLANTABLE CARDIOVERTER-DEFIBRILLATOR (ICD) ELECTRODE LEAD PLACEMENT N/A 3/9/2020    Procedure: REVISION, INSERTION, ELECTRODE LEAD, ICD;  Surgeon: Harry Yun MD;  Location: [facility] EP LAB;  Service: Cardiology;  Laterality: N/A;  VT, ICD Gen Change and Lead Revision, MDT, MAC, DM,3 Prep    TREATMENT OF CARDIAC ARRHYTHMIA  10/18/2019    Procedure: Cardioversion or Defibrillation;  Surgeon: Raz Wagner MD;  Location: [facility] EP LAB;  Service: Cardiology;;       Past Psychiatric History:  Previous Medication Trials: yes, xanax, ambien  Previous Psychiatric  "Hospitalizations: no   Previous Suicide Attempts: no   History of Violence: unknown  Outpatient Psychiatrist: yes, Dr. Krueger     Social History:  Marital Status:   Children: 3   Employment Status/Info: on disability  Education:  college  Special Ed: no  Housing Status: with family   History of phys/sexual abuse: unknown  Access to gun: no    Substance Abuse History:  Recreational Drugs:  denied  Use of Alcohol: denied  Rehab History: unknown   Tobacco Use: no  Use of OTC: denied    Legal History:  Past Charges/Incarcerations: unknown   Pending charges: unknown     Family Psychiatric History:   unknown    Psychosocial Stressors: health  Functioning Relationships: good support system        Hospital Course: :  NAMainor. Had some CP but EKG normal and not requiring nitro. Required prn zofran for emesis. Had PM dose of Remeron, no Buspar or Xanax PRNs ordered.    On evaluation this AM the patient is in a calm and cooperative mood, staring out the window but making eye contact on questioning. He says he slept well last night for 5 hours. He does not claim SI and has minimal interaction with staff, appearing tired. Per staff he slept a lot during the day. He is counseled on the importance of staying active while in hospital and he nods, he says "the suns out". He is also counseled on his Xanax being used with the Remeron, vs. The Ambien and he voices understanding. He has no questions for staff.        Family History       Problem Relation (Age of Onset)    Cancer Sister (54)    Coronary artery disease Father    Diabetes Father    Heart disease Father    Hypertension Father    No Known Problems Mother, Brother          Tobacco Use    Smoking status: Former Smoker     Packs/day: 1.00     Years: 31.00     Pack years: 31.00     Types: Cigarettes     Quit date: 2018     Years since quittin.5    Smokeless tobacco: Never Used    Tobacco comment: pt is quiting on his own - pt stated not qualified for " "program; 2/16 pt  quit on his own   Substance and Sexual Activity    Alcohol use: No     Alcohol/week: 0.0 standard drinks     Comment: quit    Drug use: No    Sexual activity: Yes     Partners: Female     Birth control/protection: None     Comment: 10/5/17  with same partner 7 years      Psychotherapeutics (From admission, onward)                Start     Stop Route Frequency Ordered    08/14/22 1500  busPIRone tablet 5 mg         -- PER NG TUBE 3 times daily 08/14/22 1224    08/14/22 1323  ALPRAZolam tablet 1 mg         -- PER NG TUBE Daily PRN 08/14/22 1224    08/12/22 2100  mirtazapine tablet 15 mg         -- Oral Nightly 08/12/22 1121          Objective:     Vital Signs (Most Recent):  Temp: 97.7 °F (36.5 °C) (08/14/22 1500)  Pulse: 76 (08/14/22 1537)  Resp: (!) 30 (08/14/22 1537)  BP: (!) 76/0 (08/14/22 1500)  SpO2: 96 % (08/14/22 1500)   Vital Signs (24h Range):  Temp:  [97.5 °F (36.4 °C)-98.1 °F (36.7 °C)] 97.7 °F (36.5 °C)  Pulse:  [48-85] 76  Resp:  [10-44] 30  SpO2:  [94 %-100 %] 96 %  BP: (66-82)/(0) 76/0  Arterial Line BP: ()/() 76/65     Height: 6' 1" (185.4 cm)  Weight: 84.4 kg (186 lb)  Body mass index is 24.54 kg/m².      Intake/Output Summary (Last 24 hours) at 8/14/2022 1551  Last data filed at 8/14/2022 1500  Gross per 24 hour   Intake 2469.93 ml   Output 1625 ml   Net 844.93 ml     Significant Labs: All pertinent labs within the past 24 hours have been reviewed.    Significant Imaging: I have reviewed all pertinent imaging results/findings within the past 24 hours.    Mental Status Exam:  Appearance: groomed, in hospital gown, lying in bed  Behavior/Cooperation: cooperative, fair eye contact  Speech: normal rate, rhythm, prasody  Mood: "fine"  Affect: full, mood congruent, reactive  Thought Process: linear and goal oriented  Thought Content: denies SI, HI, AVH  Orientation: A&Ox4  Memory: recent and remote intact  Attention Span/Concentration: intact  Insight: " fair  Judgment: fair        Scheduled Medications:   acetylcysteine 100 mg/ml (10%)  4 mL Nebulization Q6H    amiodarone  400 mg Oral Daily    aspirin  81 mg Per OG tube Daily    busPIRone  5 mg Per NG tube TID    cholestyramine  1 packet Per NG tube QID    docusate  100 mg Per NG tube Daily    insulin aspart U-100  4 Units Subcutaneous 6 times per day    insulin detemir U-100  6 Units Subcutaneous QHS    levalbuterol  0.63 mg Nebulization Q6H    magnesium oxide  400 mg Oral BID    meropenem (MERREM) IVPB  2 g Intravenous Q8H    [START ON 8/15/2022] methIMAzole  10 mg Per NG tube Daily    mexiletine  400 mg Oral Q8H    mirtazapine  15 mg Oral QHS    ondansetron  4 mg Intravenous Once    pantoprazole  40 mg Per NG tube Daily    polyethylene glycol  17 g Per NG tube Daily    predniSONE  40 mg Per NG tube Daily       PRN Medications:  sodium chloride 0.9%, sodium chloride 0.9%, acetaminophen, ALPRAZolam, artificial tears, barium sulfate, barium sulfate, bisacodyL, [COMPLETED] calcium gluconate IVPB **AND** calcium gluconate IVPB, dextrose 10 % in water (D10W), dextrose 10%, dextrose 10%, glucagon (human recombinant), glucose, guaiFENesin 100 mg/5 ml, HYDROmorphone, HYDROmorphone, hydrOXYzine HCL, insulin aspart U-100, melatonin, nitroGLYCERIN    Review of patient's allergies indicates:   Allergen Reactions    Lisinopril Anaphylaxis    Hydralazine analogues      Chronic constipation, impotence, dizziness       Assessment/Plan:     Adjustment disorder with mixed anxiety and depressed mood  Insomnia     Tim Richards is a 56 y.o. male with a past psychiatric history of generalized anxiety disorder, adjustment disorder, and insomnia who presented to [facility] due to Left ventricular assist device (LVAD) complication. Psychiatry was consulted for depression. Remeron initiated 8/12, pt states that he continues to have poor sleep as well as continued severe anxiety.    - Continue Remeron 15mg qhs; plan to  continue to assess efficacy of remeron for sleep; unlikely to switch to home ambien CR 12.5mg PRN qhs as interacts with Xanax  - Continue home Xanax 1mg PRN daily, ordered by primary team  - Continue Buspar 5mg TID for anxiety; received this afternoon  - 7/30/22 EKG QTc 510; please obtain repeat EKG  - Please limit nighttime interruptions as much as possible.   - Patient does not meet criteria for PEC or inpatient psychiatric admission at this time. Patient is not currently an imminent danger to self or others and is not gravely disabled due to a psychiatric illness.     Need for Continued Hospitalization:  No need for inpatient psychiatric hospitalization. Continue medical care as per the primary team.    Total time:  25 with greater than 50% of this time spent in counseling and/or coordination of care.     Ed Vargas MD   Psychiatry  [facility]

## 2022-08-14 NOTE — PROGRESS NOTES
[facility]  Heart Transplant  Progress Note    Patient Name: Tim Richards  MRN: [id number]  Admission Date: 6/27/2022  Hospital Length of Stay: 48 days  Attending Physician: Isaiah Santizo MD  Primary Care Provider: Deyanira Booth MD  Principal Problem:Left ventricular assist device (LVAD) complication    Subjective:     Interval History: No issues overnight. He had no complaints this am. He was encouraged  to continue towards improvement and was reassured. He saw psychiatry yesterday who recommend antidepressant/anti-anxiolytic therapy. Pt started on buspar and xanax prn as recommended.    Continuous Infusions:   dextrose 10 % in water (D10W)      heparin (porcine) in D5W 23 Units/kg/hr (08/14/22 1200)     Scheduled Meds:   acetylcysteine 100 mg/ml (10%)  4 mL Nebulization Q6H    amiodarone  400 mg Oral Daily    aspirin  81 mg Per OG tube Daily    cholestyramine  1 packet Per NG tube QID    docusate  100 mg Per NG tube Daily    insulin aspart U-100  4 Units Subcutaneous 6 times per day    insulin detemir U-100  6 Units Subcutaneous QHS    levalbuterol  0.63 mg Nebulization Q6H    magnesium oxide  400 mg Oral BID    meropenem (MERREM) IVPB  2 g Intravenous Q8H    [START ON 8/15/2022] methIMAzole  10 mg Per NG tube Daily    mexiletine  400 mg Oral Q8H    mirtazapine  15 mg Oral QHS    ondansetron  4 mg Intravenous Once    pantoprazole  40 mg Per NG tube Daily    polyethylene glycol  17 g Per NG tube Daily    predniSONE  40 mg Per NG tube Daily     PRN Meds:sodium chloride 0.9%, sodium chloride 0.9%, acetaminophen, artificial tears, barium sulfate, barium sulfate, bisacodyL, [COMPLETED] calcium gluconate IVPB **AND** calcium gluconate IVPB, dextrose 10 % in water (D10W), dextrose 10%, dextrose 10%, glucagon (human recombinant), glucose, guaiFENesin 100 mg/5 ml, HYDROmorphone, HYDROmorphone, hydrOXYzine HCL, insulin aspart U-100, melatonin,  nitroGLYCERIN    Review of patient's allergies indicates:   Allergen Reactions    Lisinopril Anaphylaxis    Hydralazine analogues      Chronic constipation, impotence, dizziness     Objective:     Vital Signs (Most Recent):  Temp: 97.5 °F (36.4 °C) (08/14/22 1100)  Pulse: 75 (08/14/22 1215)  Resp: (!) 36 (08/14/22 1215)  BP: (!) 66/0 (08/14/22 0730)  SpO2: 98 % (08/14/22 1100)   Vital Signs (24h Range):  Temp:  [97.5 °F (36.4 °C)-98.1 °F (36.7 °C)] 97.5 °F (36.4 °C)  Pulse:  [48-85] 75  Resp:  [10-44] 36  SpO2:  [94 %-100 %] 98 %  BP: (66-82)/(0) 66/0  Arterial Line BP: ()/() 89/78     Patient Vitals for the past 72 hrs (Last 3 readings):   Weight   08/13/22 0600 84.4 kg (186 lb)       Body mass index is 24.54 kg/m².      Intake/Output Summary (Last 24 hours) at 8/14/2022 1222  Last data filed at 8/14/2022 1200  Gross per 24 hour   Intake 2625.92 ml   Output 1625 ml   Net 1000.92 ml         Hemodynamic Parameters:       Physical Exam  Vitals and nursing note reviewed.   Constitutional:       General: He is not in acute distress.     Appearance: Normal appearance. He is normal weight.      Comments: NGT in place (feeding)   HENT:      Head: Normocephalic.      Mouth/Throat:      Mouth: Mucous membranes are moist.   Cardiovascular:      Rate and Rhythm: Normal rate.      Heart sounds: Murmur heard.   Pulmonary:      Effort: Pulmonary effort is normal. No respiratory distress.      Breath sounds: Normal breath sounds. No wheezing or rales.   Abdominal:      General: Bowel sounds are normal. There is no distension.      Palpations: Abdomen is soft.      Tenderness: There is no abdominal tenderness.   Musculoskeletal:         General: No swelling.      Cervical back: Neck supple.   Skin:     General: Skin is warm.      Capillary Refill: Capillary refill takes less than 2 seconds.   Neurological:      General: No focal deficit present.      Mental Status: He is alert.      Motor: Weakness present.    Psychiatric:         Mood and Affect: Mood normal.       Significant Labs:  CBC:  Recent Labs   Lab 08/13/22 1946 08/14/22  0242 08/14/22  1148   WBC 27.47* 26.49* 29.17*   RBC 2.80* 2.74* 3.05*   HGB 8.0* 7.9* 8.4*   HCT 28.7* 27.9* 30.0*    202 208   * 102* 98   MCH 28.6 28.8 27.5   MCHC 27.9* 28.3* 28.0*       BNP:  Recent Labs   Lab 08/08/22  0310 08/10/22  0333 08/12/22  0325   * 216* 230*       CMP:  Recent Labs   Lab 08/12/22 1949 08/12/22  2321 08/13/22 0317 08/13/22  1100 08/13/22  1708 08/14/22  0005 08/14/22  0242   *   < > 118*   < > 162* 98 70   CALCIUM 9.3   < > 9.6   < > 9.5 9.2 9.1   ALBUMIN 2.2*  --  2.3*  --   --   --  2.1*   PROT 6.1  --  6.9  --   --   --  6.2      < > 139   < > 138 137 141   K 6.2*   < > 5.9*   < > 6.0* 5.9* 5.5*   CO2 16*   < > 17*   < > 15* 16* 17*   *   < > 113*   < > 113* 113* 117*   BUN 54*   < > 53*   < > 52* 49* 44*   CREATININE 1.5*   < > 1.4   < > 1.3 1.3 1.3   ALKPHOS 205*  --  195*  --   --   --  200*   ALT 61*  --  59*  --   --   --  59*   AST 51*  --  73*  --   --   --  59*   BILITOT 3.1*  --  2.9*  --   --   --  2.6*    < > = values in this interval not displayed.        Coagulation:   Recent Labs   Lab 08/13/22  1100 08/13/22  1708 08/13/22 1946 08/14/22  0242 08/14/22  1148   INR 1.0  --  1.0 1.0 1.0   APTT 40.8* 42.7*  --  41.3*  --        LDH:  Recent Labs   Lab 08/12/22  0509 08/13/22  1100    275*       Microbiology:  Microbiology Results (last 7 days)       Procedure Component Value Units Date/Time    Blood culture [[id number]] Collected: 08/09/22 1102    Order Status: Completed Specimen: Blood from Peripheral, Antecubital, Left Updated: 08/14/22 1212     Blood Culture, Routine No growth after 5 days.    Blood culture [[id number]] Collected: 08/09/22 1045    Order Status: Completed Specimen: Blood from Peripheral, Hand, Right Updated: 08/14/22 1212     Blood Culture, Routine No growth after 5 days.     Aerobic culture [[id number]] Collected: 08/09/22 1347    Order Status: Completed Specimen: Incision site from Abdomen Updated: 08/12/22 1120     Aerobic Bacterial Culture No growth    Narrative:      DL site.    Culture, Respiratory with Gram Stain [[id number]] Collected: 08/09/22 1026    Order Status: Completed Specimen: Respiratory from Endotracheal Aspirate Updated: 08/11/22 1051     Respiratory Culture Normal respiratory sachin      No S aureus or Pseudomonas isolated.     Gram Stain (Respiratory) <10 epithelial cells per low power field.     Gram Stain (Respiratory) Rare WBC's     Gram Stain (Respiratory) No organisms seen    Culture, Anaerobe [[id number]] Collected: 08/09/22 1347    Order Status: Completed Specimen: Incision site from Abdomen Updated: 08/11/22 0925     Anaerobic Culture Culture in progress    Narrative:      DL site            Estimated Creatinine Clearance: 72.6 mL/min (based on SCr of 1.3 mg/dL).    Assessment and Plan:       57 Y/O M with Hx of stage D HFrEF (EF 10%) due to NICM underwent HM2 implant 3/8/18 and exchange of outflow graft 3/9/18, Hx VT/VF s/p SICD 2014 presenting with gradual worsening shortness of breath associated with nonpleuritic, nonradiating bilateral 4/10 retrosternal chest pressure pain.  Symptoms began yesterday and have gradually worsened in the last 12 hours.  He does report going to a family picnic with increased consumption of chicken wings, ribs and other salty meat products.  Prior to symptom onset, he reports nausea, nonbloody diarrhea began yesterday and single episode of nonbloody nonbilious vomiting this morning. He also complains of dizziness, lightheadedness, and a mild HA. SOB worsened this morning prompting him to come to the ED.      In the ED, patient was in respiratory distress requiring BiPAP. MAP 96 and started on Nitro gtt. Work-up revealed WBC 10, K 5.4, Cr 2.5 (baseline 1.9), BNP 1950 (baseline 200-300s), Bili 2.1, LDH 1058, and INR 3.2. Bedside TTE  with severely reduced EF, AV not opening, septum bulging into RV. Given IV Lasix 80 mg x1 with only 100-200 mL UOP and dark in color. HM2 interrogated and flows have been 8.5-9 L/min with no alarms or power surges. Cardiology consulted in the ED, dicussed with HTS, and decision made to admit to ICU for further mgmt.         Assessment and Plan:  Cardiogenic Shock  -Previous HM2, underwent pump exchange to HM3 on 7/13/22 complicated by worsening CS/RV failure requiring VA ECMO now de-cannulated     Currently trach ed. Chest tube removal, bronch, and old driveline removed 7/22.  -Re-intubated on 7/29/22 due to hypercapnic resp failure. Trach placed on 8/4.   -Doing well on Trach collar.   -Off diuretics currently. Creatinine has trended down to baseline.      Trach site bleeding: Resolved.   - Continue minimal dosing heparin.   - Surgical team placed a suture on 8/11.      LVAD:  TXP LVAD INTERROGATIONS 8/13/2022 8/13/2022 8/13/2022 8/13/2022 8/13/2022 8/13/2022 8/13/2022   Type HeartMate3 HeartMate3 HeartMate3 HeartMate3 HeartMate3 HeartMate3 HeartMate3   Flow 5.4 5.4 5.5 5.5 5.5 5.6 5.6   Speed 6300 6300 6300 6300 6300 6300 6300   PI 3.3 2.9 2.7 2.9 3.1 2.5 2.1   Power ([place]) 5.3 5.3 5.4 5.4 5.3 5.4 5.4   LSL 5900 5900 5900 5900 5900 5900 5900   Low Flow Alarm - - - - - - -   Pulsatility - - - No Pulse No Pulse No Pulse No Pulse      - No significant events.   - Functioning well.      Fever: No fever in 7  Days:  Leucocytosis trending down albeit slowly.   ID team on board.   Changed to meropenem . Plan to continue meropenem for 7 days, currently on day#6 of 7.  No growth in cultures yet.      History of ventricular tachycardia/Episode of A flutter 2:1 block:  Patient experienced an episode of VT on 6/30 and experienced an ICD shock thought to be related to hypokalemia (K of 3.1 on 6/30)  Aggressively replete K, keep K>4 and Mg>2  Continue mexiletine PO.  Amio gtt transitioned to PO.      COVID-19 virus  infection:  -Previously hypoxic then recovered  -ID was consulted, recommended Remdesivir, course completed     Elevated serum lactate dehydrogenase (LDH):  S/p HM3 exchange for HM2 pump thrombosis  Continue to trend LDH.      Amiodarone induced hypothyrodism initially, now hyperthyroidism:  -Endocrine team on board.  -On prednisone and methimazole.   - Prednisone 40 mg.   -Continue to monitor free T4. Trending down.   - Medications has been changes as per Endocrinology team.       Chelle Bailey MD  Heart Transplant  [facility]

## 2022-08-14 NOTE — HOSPITAL COURSE
"8/14:  NAEONs. Had some CP but EKG normal and not requiring nitro. Required prn zofran for emesis. Had PM dose of Remeron, no Buspar or Xanax PRNs ordered.    On evaluation this AM the patient is in a calm and cooperative mood, staring out the window but making eye contact on questioning. He says he slept well last night for 5 hours. He does not claim SI and has minimal interaction with staff, appearing tired. Per staff he slept a lot during the day. He is counseled on the importance of staying active while in hospital and he nods, he says "the suns out". He is also counseled on his Xanax being used with the Remeron, vs. The Ambien and he voices understanding. He has no questions for staff.    8/15:  Pt had elevated glucose and hyperkalemia overnight requiring fluid bolus and lasix. Required PRN hydroxyzine and melatonin for sleep as well. Did not receive PRN Xanax.    Patient seen working with PT this morning. He is calm and cooperative. States that he slept "off and on" last night and is not sure how many hours he slept. States that this is due to to being in bed "24/7". Reports that he feels tired and denies any significant change in his sleep. He denies any problems with Remeron or Buspar. Denies any complaints or questions at this time.     8/16:  No acute events overnight. Patient greeted at bedside this morning. States that he slept better last night and got 7 hours of sleep. States that his Remeron and Buspar are working well. Denies current depression or anxiety. Denies SI/HI/AVH. Denies any complaints at this time.  "

## 2022-08-15 LAB
ALBUMIN SERPL BCP-MCNC: 2.2 G/DL (ref 3.5–5.2)
ALBUMIN SERPL BCP-MCNC: 2.2 G/DL (ref 3.5–5.2)
ALLENS TEST: ABNORMAL
ALP SERPL-CCNC: 235 U/L (ref 55–135)
ALP SERPL-CCNC: 237 U/L (ref 55–135)
ALT SERPL W/O P-5'-P-CCNC: 61 U/L (ref 10–44)
ALT SERPL W/O P-5'-P-CCNC: 62 U/L (ref 10–44)
ANION GAP SERPL CALC-SCNC: 7 MMOL/L (ref 8–16)
ANION GAP SERPL CALC-SCNC: 8 MMOL/L (ref 8–16)
ANION GAP SERPL CALC-SCNC: 8 MMOL/L (ref 8–16)
ANISOCYTOSIS BLD QL SMEAR: SLIGHT
APTT BLDCRRT: 49.4 SEC (ref 21–32)
AST SERPL-CCNC: 55 U/L (ref 10–40)
AST SERPL-CCNC: 58 U/L (ref 10–40)
BASOPHILS # BLD AUTO: 0.04 K/UL (ref 0–0.2)
BASOPHILS # BLD AUTO: 0.05 K/UL (ref 0–0.2)
BASOPHILS NFR BLD: 0 % (ref 0–1.9)
BASOPHILS NFR BLD: 0.2 % (ref 0–1.9)
BASOPHILS NFR BLD: 0.2 % (ref 0–1.9)
BILIRUB DIRECT SERPL-MCNC: 1.7 MG/DL (ref 0.1–0.3)
BILIRUB SERPL-MCNC: 2.5 MG/DL (ref 0.1–1)
BILIRUB SERPL-MCNC: 2.6 MG/DL (ref 0.1–1)
BNP SERPL-MCNC: 599 PG/ML (ref 0–99)
BUN SERPL-MCNC: 42 MG/DL (ref 6–20)
BUN SERPL-MCNC: 42 MG/DL (ref 6–20)
BUN SERPL-MCNC: 44 MG/DL (ref 6–20)
BUN SERPL-MCNC: 44 MG/DL (ref 6–20)
BUN SERPL-MCNC: 45 MG/DL (ref 6–20)
BURR CELLS BLD QL SMEAR: ABNORMAL
CALCIUM SERPL-MCNC: 9.2 MG/DL (ref 8.7–10.5)
CALCIUM SERPL-MCNC: 9.2 MG/DL (ref 8.7–10.5)
CALCIUM SERPL-MCNC: 9.3 MG/DL (ref 8.7–10.5)
CALCIUM SERPL-MCNC: 9.4 MG/DL (ref 8.7–10.5)
CALCIUM SERPL-MCNC: 9.4 MG/DL (ref 8.7–10.5)
CHLORIDE SERPL-SCNC: 115 MMOL/L (ref 95–110)
CHLORIDE SERPL-SCNC: 115 MMOL/L (ref 95–110)
CHLORIDE SERPL-SCNC: 116 MMOL/L (ref 95–110)
CHLORIDE SERPL-SCNC: 116 MMOL/L (ref 95–110)
CHLORIDE SERPL-SCNC: 117 MMOL/L (ref 95–110)
CO2 SERPL-SCNC: 14 MMOL/L (ref 23–29)
CO2 SERPL-SCNC: 15 MMOL/L (ref 23–29)
CO2 SERPL-SCNC: 16 MMOL/L (ref 23–29)
CO2 SERPL-SCNC: 18 MMOL/L (ref 23–29)
CO2 SERPL-SCNC: 18 MMOL/L (ref 23–29)
CREAT SERPL-MCNC: 1.1 MG/DL (ref 0.5–1.4)
CRP SERPL-MCNC: 3 MG/L (ref 0–8.2)
DIFFERENTIAL METHOD: ABNORMAL
EOSINOPHIL # BLD AUTO: 0 K/UL (ref 0–0.5)
EOSINOPHIL # BLD AUTO: 0.1 K/UL (ref 0–0.5)
EOSINOPHIL NFR BLD: 0 % (ref 0–8)
EOSINOPHIL NFR BLD: 0 % (ref 0–8)
EOSINOPHIL NFR BLD: 0.2 % (ref 0–8)
ERYTHROCYTE [DISTWIDTH] IN BLOOD BY AUTOMATED COUNT: 19.2 % (ref 11.5–14.5)
ERYTHROCYTE [DISTWIDTH] IN BLOOD BY AUTOMATED COUNT: 19.3 % (ref 11.5–14.5)
ERYTHROCYTE [DISTWIDTH] IN BLOOD BY AUTOMATED COUNT: 19.4 % (ref 11.5–14.5)
EST. GFR  (NO RACE VARIABLE): >60 ML/MIN/1.73 M^2
FIBRINOGEN PPP-MCNC: 531 MG/DL (ref 182–400)
GIANT PLATELETS BLD QL SMEAR: PRESENT
GLUCOSE SERPL-MCNC: 108 MG/DL (ref 70–110)
GLUCOSE SERPL-MCNC: 109 MG/DL (ref 70–110)
GLUCOSE SERPL-MCNC: 125 MG/DL (ref 70–110)
GLUCOSE SERPL-MCNC: 130 MG/DL (ref 70–110)
GLUCOSE SERPL-MCNC: 140 MG/DL (ref 70–110)
HCO3 UR-SCNC: 21.9 MMOL/L (ref 24–28)
HCT VFR BLD AUTO: 28.7 % (ref 40–54)
HCT VFR BLD AUTO: 29.7 % (ref 40–54)
HCT VFR BLD AUTO: 32.7 % (ref 40–54)
HCT VFR BLD CALC: 29 %PCV (ref 36–54)
HGB BLD-MCNC: 8.3 G/DL (ref 14–18)
HGB BLD-MCNC: 8.3 G/DL (ref 14–18)
HGB BLD-MCNC: 8.9 G/DL (ref 14–18)
HYPOCHROMIA BLD QL SMEAR: ABNORMAL
IMM GRANULOCYTES # BLD AUTO: 0.96 K/UL (ref 0–0.04)
IMM GRANULOCYTES # BLD AUTO: 1.07 K/UL (ref 0–0.04)
IMM GRANULOCYTES # BLD AUTO: ABNORMAL K/UL (ref 0–0.04)
IMM GRANULOCYTES NFR BLD AUTO: 4.3 % (ref 0–0.5)
IMM GRANULOCYTES NFR BLD AUTO: 4.4 % (ref 0–0.5)
IMM GRANULOCYTES NFR BLD AUTO: ABNORMAL % (ref 0–0.5)
INR PPP: 1 (ref 0.8–1.2)
LYMPHOCYTES # BLD AUTO: 0.4 K/UL (ref 1–4.8)
LYMPHOCYTES # BLD AUTO: 0.6 K/UL (ref 1–4.8)
LYMPHOCYTES NFR BLD: 0 % (ref 18–48)
LYMPHOCYTES NFR BLD: 1.7 % (ref 18–48)
LYMPHOCYTES NFR BLD: 2.7 % (ref 18–48)
MAGNESIUM SERPL-MCNC: 2.6 MG/DL (ref 1.6–2.6)
MAGNESIUM SERPL-MCNC: 2.7 MG/DL (ref 1.6–2.6)
MCH RBC QN AUTO: 28.1 PG (ref 27–31)
MCH RBC QN AUTO: 28.5 PG (ref 27–31)
MCH RBC QN AUTO: 28.9 PG (ref 27–31)
MCHC RBC AUTO-ENTMCNC: 27.2 G/DL (ref 32–36)
MCHC RBC AUTO-ENTMCNC: 27.9 G/DL (ref 32–36)
MCHC RBC AUTO-ENTMCNC: 28.9 G/DL (ref 32–36)
MCV RBC AUTO: 100 FL (ref 82–98)
MCV RBC AUTO: 102 FL (ref 82–98)
MCV RBC AUTO: 103 FL (ref 82–98)
METAMYELOCYTES NFR BLD MANUAL: 1 %
MONOCYTES # BLD AUTO: 0.7 K/UL (ref 0.3–1)
MONOCYTES # BLD AUTO: 0.8 K/UL (ref 0.3–1)
MONOCYTES NFR BLD: 3.1 % (ref 4–15)
MONOCYTES NFR BLD: 3.5 % (ref 4–15)
MONOCYTES NFR BLD: 6 % (ref 4–15)
NEUTROPHILS # BLD AUTO: 19.8 K/UL (ref 1.8–7.7)
NEUTROPHILS # BLD AUTO: 21.9 K/UL (ref 1.8–7.7)
NEUTROPHILS NFR BLD: 89.7 % (ref 38–73)
NEUTROPHILS NFR BLD: 90 % (ref 38–73)
NEUTROPHILS NFR BLD: 93 % (ref 38–73)
NRBC BLD-RTO: 0 /100 WBC
OVALOCYTES BLD QL SMEAR: ABNORMAL
PCO2 BLDA: 45.1 MMHG (ref 35–45)
PH SMN: 7.29 [PH] (ref 7.35–7.45)
PHOSPHATE SERPL-MCNC: 2.5 MG/DL (ref 2.7–4.5)
PHOSPHATE SERPL-MCNC: 2.8 MG/DL (ref 2.7–4.5)
PHOSPHATE SERPL-MCNC: 3 MG/DL (ref 2.7–4.5)
PHOSPHATE SERPL-MCNC: 3 MG/DL (ref 2.7–4.5)
PLATELET # BLD AUTO: 177 K/UL (ref 150–450)
PLATELET # BLD AUTO: 179 K/UL (ref 150–450)
PLATELET # BLD AUTO: 239 K/UL (ref 150–450)
PLATELET BLD QL SMEAR: ABNORMAL
PMV BLD AUTO: 11.9 FL (ref 9.2–12.9)
PMV BLD AUTO: 12.4 FL (ref 9.2–12.9)
PMV BLD AUTO: 12.7 FL (ref 9.2–12.9)
PO2 BLDA: 82 MMHG (ref 80–100)
POC BE: -5 MMOL/L
POC IONIZED CALCIUM: 1.36 MMOL/L (ref 1.06–1.42)
POC SATURATED O2: 95 % (ref 95–100)
POC TCO2: 23 MMOL/L (ref 23–27)
POCT GLUCOSE: 105 MG/DL (ref 70–110)
POCT GLUCOSE: 130 MG/DL (ref 70–110)
POCT GLUCOSE: 136 MG/DL (ref 70–110)
POCT GLUCOSE: 138 MG/DL (ref 70–110)
POCT GLUCOSE: 35 MG/DL (ref 70–110)
POIKILOCYTOSIS BLD QL SMEAR: SLIGHT
POLYCHROMASIA BLD QL SMEAR: ABNORMAL
POTASSIUM BLD-SCNC: 5.2 MMOL/L (ref 3.5–5.1)
POTASSIUM SERPL-SCNC: 4.8 MMOL/L (ref 3.5–5.1)
POTASSIUM SERPL-SCNC: 4.8 MMOL/L (ref 3.5–5.1)
POTASSIUM SERPL-SCNC: 5.2 MMOL/L (ref 3.5–5.1)
POTASSIUM SERPL-SCNC: 5.2 MMOL/L (ref 3.5–5.1)
POTASSIUM SERPL-SCNC: 5.4 MMOL/L (ref 3.5–5.1)
POTASSIUM SERPL-SCNC: 5.5 MMOL/L (ref 3.5–5.1)
POTASSIUM SERPL-SCNC: 5.5 MMOL/L (ref 3.5–5.1)
POTASSIUM SERPL-SCNC: 5.6 MMOL/L (ref 3.5–5.1)
PROT SERPL-MCNC: 6.4 G/DL (ref 6–8.4)
PROT SERPL-MCNC: 6.7 G/DL (ref 6–8.4)
PROTHROMBIN TIME: 10.3 SEC (ref 9–12.5)
PROTHROMBIN TIME: 10.3 SEC (ref 9–12.5)
PROTHROMBIN TIME: 10.4 SEC (ref 9–12.5)
PROTHROMBIN TIME: 10.5 SEC (ref 9–12.5)
RBC # BLD AUTO: 2.87 M/UL (ref 4.6–6.2)
RBC # BLD AUTO: 2.91 M/UL (ref 4.6–6.2)
RBC # BLD AUTO: 3.17 M/UL (ref 4.6–6.2)
SAMPLE: ABNORMAL
SITE: ABNORMAL
SODIUM BLD-SCNC: 142 MMOL/L (ref 136–145)
SODIUM SERPL-SCNC: 136 MMOL/L (ref 136–145)
SODIUM SERPL-SCNC: 139 MMOL/L (ref 136–145)
SODIUM SERPL-SCNC: 140 MMOL/L (ref 136–145)
SODIUM SERPL-SCNC: 141 MMOL/L (ref 136–145)
SODIUM SERPL-SCNC: 141 MMOL/L (ref 136–145)
T4 FREE SERPL DIALY-MCNC: NORMAL NG/DL
T4 FREE SERPL-MCNC: 1.46 NG/DL (ref 0.71–1.51)
WBC # BLD AUTO: 22.14 K/UL (ref 3.9–12.7)
WBC # BLD AUTO: 24.28 K/UL (ref 3.9–12.7)
WBC # BLD AUTO: 24.89 K/UL (ref 3.9–12.7)

## 2022-08-15 PROCEDURE — 85384 FIBRINOGEN ACTIVITY: CPT | Performed by: THORACIC SURGERY (CARDIOTHORACIC VASCULAR SURGERY)

## 2022-08-15 PROCEDURE — 27000248 HC VAD-ADDITIONAL DAY

## 2022-08-15 PROCEDURE — 63600175 PHARM REV CODE 636 W HCPCS: Performed by: INTERNAL MEDICINE

## 2022-08-15 PROCEDURE — 25000003 PHARM REV CODE 250: Performed by: STUDENT IN AN ORGANIZED HEALTH CARE EDUCATION/TRAINING PROGRAM

## 2022-08-15 PROCEDURE — 85610 PROTHROMBIN TIME: CPT | Performed by: THORACIC SURGERY (CARDIOTHORACIC VASCULAR SURGERY)

## 2022-08-15 PROCEDURE — 25000003 PHARM REV CODE 250

## 2022-08-15 PROCEDURE — 99900026 HC AIRWAY MAINTENANCE (STAT)

## 2022-08-15 PROCEDURE — 97110 THERAPEUTIC EXERCISES: CPT

## 2022-08-15 PROCEDURE — 85025 COMPLETE CBC W/AUTO DIFF WBC: CPT | Mod: 91 | Performed by: THORACIC SURGERY (CARDIOTHORACIC VASCULAR SURGERY)

## 2022-08-15 PROCEDURE — 27000221 HC OXYGEN, UP TO 24 HOURS

## 2022-08-15 PROCEDURE — 85730 THROMBOPLASTIN TIME PARTIAL: CPT | Performed by: THORACIC SURGERY (CARDIOTHORACIC VASCULAR SURGERY)

## 2022-08-15 PROCEDURE — 63600175 PHARM REV CODE 636 W HCPCS: Performed by: STUDENT IN AN ORGANIZED HEALTH CARE EDUCATION/TRAINING PROGRAM

## 2022-08-15 PROCEDURE — 83735 ASSAY OF MAGNESIUM: CPT | Mod: 91 | Performed by: INTERNAL MEDICINE

## 2022-08-15 PROCEDURE — 97530 THERAPEUTIC ACTIVITIES: CPT

## 2022-08-15 PROCEDURE — 25000003 PHARM REV CODE 250: Performed by: INTERNAL MEDICINE

## 2022-08-15 PROCEDURE — 99233 PR SUBSEQUENT HOSPITAL CARE,LEVL III: ICD-10-PCS | Mod: ,,, | Performed by: GENERAL ACUTE CARE HOSPITAL

## 2022-08-15 PROCEDURE — 83880 ASSAY OF NATRIURETIC PEPTIDE: CPT | Performed by: STUDENT IN AN ORGANIZED HEALTH CARE EDUCATION/TRAINING PROGRAM

## 2022-08-15 PROCEDURE — 84439 ASSAY OF FREE THYROXINE: CPT | Performed by: THORACIC SURGERY (CARDIOTHORACIC VASCULAR SURGERY)

## 2022-08-15 PROCEDURE — 83735 ASSAY OF MAGNESIUM: CPT | Mod: 91 | Performed by: THORACIC SURGERY (CARDIOTHORACIC VASCULAR SURGERY)

## 2022-08-15 PROCEDURE — 80048 BASIC METABOLIC PNL TOTAL CA: CPT | Mod: 91,XB | Performed by: INTERNAL MEDICINE

## 2022-08-15 PROCEDURE — 99900035 HC TECH TIME PER 15 MIN (STAT)

## 2022-08-15 PROCEDURE — 20000000 HC ICU ROOM

## 2022-08-15 PROCEDURE — 99233 SBSQ HOSP IP/OBS HIGH 50: CPT | Mod: ,,, | Performed by: INTERNAL MEDICINE

## 2022-08-15 PROCEDURE — 85007 BL SMEAR W/DIFF WBC COUNT: CPT | Performed by: THORACIC SURGERY (CARDIOTHORACIC VASCULAR SURGERY)

## 2022-08-15 PROCEDURE — 25000242 PHARM REV CODE 250 ALT 637 W/ HCPCS

## 2022-08-15 PROCEDURE — 80053 COMPREHEN METABOLIC PANEL: CPT | Performed by: INTERNAL MEDICINE

## 2022-08-15 PROCEDURE — 94761 N-INVAS EAR/PLS OXIMETRY MLT: CPT

## 2022-08-15 PROCEDURE — 80048 BASIC METABOLIC PNL TOTAL CA: CPT | Mod: 91,XB | Performed by: STUDENT IN AN ORGANIZED HEALTH CARE EDUCATION/TRAINING PROGRAM

## 2022-08-15 PROCEDURE — 85610 PROTHROMBIN TIME: CPT | Mod: 91 | Performed by: THORACIC SURGERY (CARDIOTHORACIC VASCULAR SURGERY)

## 2022-08-15 PROCEDURE — 86140 C-REACTIVE PROTEIN: CPT | Performed by: STUDENT IN AN ORGANIZED HEALTH CARE EDUCATION/TRAINING PROGRAM

## 2022-08-15 PROCEDURE — 99233 PR SUBSEQUENT HOSPITAL CARE,LEVL III: ICD-10-PCS | Mod: ,,, | Performed by: INTERNAL MEDICINE

## 2022-08-15 PROCEDURE — 25000242 PHARM REV CODE 250 ALT 637 W/ HCPCS: Performed by: THORACIC SURGERY (CARDIOTHORACIC VASCULAR SURGERY)

## 2022-08-15 PROCEDURE — 97112 NEUROMUSCULAR REEDUCATION: CPT

## 2022-08-15 PROCEDURE — 99233 SBSQ HOSP IP/OBS HIGH 50: CPT | Mod: ,,, | Performed by: GENERAL ACUTE CARE HOSPITAL

## 2022-08-15 PROCEDURE — 94640 AIRWAY INHALATION TREATMENT: CPT

## 2022-08-15 PROCEDURE — 84100 ASSAY OF PHOSPHORUS: CPT | Mod: 91 | Performed by: THORACIC SURGERY (CARDIOTHORACIC VASCULAR SURGERY)

## 2022-08-15 PROCEDURE — 85027 COMPLETE CBC AUTOMATED: CPT | Performed by: THORACIC SURGERY (CARDIOTHORACIC VASCULAR SURGERY)

## 2022-08-15 PROCEDURE — 84100 ASSAY OF PHOSPHORUS: CPT | Mod: 91 | Performed by: INTERNAL MEDICINE

## 2022-08-15 PROCEDURE — 94003 VENT MGMT INPAT SUBQ DAY: CPT

## 2022-08-15 PROCEDURE — 93750 PR INTERROGATE VENT ASSIST DEV, IN PERSON, W PHYSICIAN ANALYSIS: ICD-10-PCS | Mod: ,,, | Performed by: INTERNAL MEDICINE

## 2022-08-15 PROCEDURE — 80076 HEPATIC FUNCTION PANEL: CPT | Performed by: THORACIC SURGERY (CARDIOTHORACIC VASCULAR SURGERY)

## 2022-08-15 PROCEDURE — 25000003 PHARM REV CODE 250: Performed by: PHYSICIAN ASSISTANT

## 2022-08-15 PROCEDURE — 93750 INTERROGATION VAD IN PERSON: CPT | Mod: ,,, | Performed by: INTERNAL MEDICINE

## 2022-08-15 RX ORDER — METHIMAZOLE 5 MG/1
5 TABLET ORAL DAILY
Status: DISCONTINUED | OUTPATIENT
Start: 2022-08-16 | End: 2022-08-16

## 2022-08-15 RX ORDER — INSULIN ASPART 100 [IU]/ML
3 INJECTION, SOLUTION INTRAVENOUS; SUBCUTANEOUS
Status: DISCONTINUED | OUTPATIENT
Start: 2022-08-15 | End: 2022-08-16

## 2022-08-15 RX ORDER — FUROSEMIDE 10 MG/ML
20 INJECTION INTRAMUSCULAR; INTRAVENOUS ONCE
Status: COMPLETED | OUTPATIENT
Start: 2022-08-15 | End: 2022-08-15

## 2022-08-15 RX ADMIN — MEXILETINE HYDROCHLORIDE 400 MG: 200 CAPSULE ORAL at 09:08

## 2022-08-15 RX ADMIN — HYDROXYZINE HYDROCHLORIDE 25 MG: 25 TABLET ORAL at 01:08

## 2022-08-15 RX ADMIN — FUROSEMIDE 20 MG: 10 INJECTION, SOLUTION INTRAMUSCULAR; INTRAVENOUS at 01:08

## 2022-08-15 RX ADMIN — INSULIN ASPART 3 UNITS: 100 INJECTION, SOLUTION INTRAVENOUS; SUBCUTANEOUS at 09:08

## 2022-08-15 RX ADMIN — Medication 400 MG: at 09:08

## 2022-08-15 RX ADMIN — Medication 6 MG: at 01:08

## 2022-08-15 RX ADMIN — INSULIN ASPART 4 UNITS: 100 INJECTION, SOLUTION INTRAVENOUS; SUBCUTANEOUS at 08:08

## 2022-08-15 RX ADMIN — MEXILETINE HYDROCHLORIDE 400 MG: 200 CAPSULE ORAL at 05:08

## 2022-08-15 RX ADMIN — MEROPENEM 2 G: 1 INJECTION INTRAVENOUS at 05:08

## 2022-08-15 RX ADMIN — LEVALBUTEROL HYDROCHLORIDE 0.63 MG: 0.63 SOLUTION RESPIRATORY (INHALATION) at 08:08

## 2022-08-15 RX ADMIN — ASPIRIN 81 MG CHEWABLE TABLET 81 MG: 81 TABLET CHEWABLE at 09:08

## 2022-08-15 RX ADMIN — LEVALBUTEROL HYDROCHLORIDE 0.63 MG: 0.63 SOLUTION RESPIRATORY (INHALATION) at 12:08

## 2022-08-15 RX ADMIN — PREDNISONE 40 MG: 20 TABLET ORAL at 09:08

## 2022-08-15 RX ADMIN — BUSPIRONE HYDROCHLORIDE 5 MG: 5 TABLET ORAL at 09:08

## 2022-08-15 RX ADMIN — Medication 6 MG: at 10:08

## 2022-08-15 RX ADMIN — SODIUM ZIRCONIUM CYCLOSILICATE 10 G: 10 POWDER, FOR SUSPENSION ORAL at 11:08

## 2022-08-15 RX ADMIN — INSULIN ASPART 4 UNITS: 100 INJECTION, SOLUTION INTRAVENOUS; SUBCUTANEOUS at 03:08

## 2022-08-15 RX ADMIN — LEVALBUTEROL HYDROCHLORIDE 0.63 MG: 0.63 SOLUTION RESPIRATORY (INHALATION) at 06:08

## 2022-08-15 RX ADMIN — MEXILETINE HYDROCHLORIDE 400 MG: 200 CAPSULE ORAL at 03:08

## 2022-08-15 RX ADMIN — MEROPENEM 2 G: 1 INJECTION INTRAVENOUS at 03:08

## 2022-08-15 RX ADMIN — MEROPENEM 2 G: 1 INJECTION INTRAVENOUS at 09:08

## 2022-08-15 RX ADMIN — ALPRAZOLAM 1 MG: 0.5 TABLET ORAL at 10:08

## 2022-08-15 RX ADMIN — CHOLESTYRAMINE 4 G: 4 POWDER, FOR SUSPENSION ORAL at 09:08

## 2022-08-15 RX ADMIN — METHIMAZOLE 10 MG: 10 TABLET ORAL at 09:08

## 2022-08-15 RX ADMIN — ACETYLCYSTEINE 4 ML: 100 SOLUTION ORAL; RESPIRATORY (INHALATION) at 06:08

## 2022-08-15 RX ADMIN — INSULIN ASPART 3 UNITS: 100 INJECTION, SOLUTION INTRAVENOUS; SUBCUTANEOUS at 12:08

## 2022-08-15 RX ADMIN — AMIODARONE HYDROCHLORIDE 400 MG: 200 TABLET ORAL at 09:08

## 2022-08-15 RX ADMIN — POLYETHYLENE GLYCOL 3350 17 G: 17 POWDER, FOR SOLUTION ORAL at 09:08

## 2022-08-15 RX ADMIN — DOCUSATE SODIUM 100 MG: 50 LIQUID ORAL at 09:08

## 2022-08-15 RX ADMIN — ACETYLCYSTEINE 4 ML: 100 SOLUTION ORAL; RESPIRATORY (INHALATION) at 08:08

## 2022-08-15 RX ADMIN — ACETYLCYSTEINE 4 ML: 100 SOLUTION ORAL; RESPIRATORY (INHALATION) at 01:08

## 2022-08-15 RX ADMIN — ACETYLCYSTEINE 4 ML: 100 SOLUTION ORAL; RESPIRATORY (INHALATION) at 12:08

## 2022-08-15 RX ADMIN — PANTOPRAZOLE SODIUM 40 MG: 40 GRANULE, DELAYED RELEASE ORAL at 09:08

## 2022-08-15 RX ADMIN — HYDROXYZINE HYDROCHLORIDE 25 MG: 25 TABLET ORAL at 10:08

## 2022-08-15 RX ADMIN — HEPARIN SODIUM 23 UNITS/KG/HR: 5000 INJECTION INTRAVENOUS; SUBCUTANEOUS at 01:08

## 2022-08-15 RX ADMIN — MIRTAZAPINE 15 MG: 15 TABLET, FILM COATED ORAL at 09:08

## 2022-08-15 RX ADMIN — LEVALBUTEROL HYDROCHLORIDE 0.63 MG: 0.63 SOLUTION RESPIRATORY (INHALATION) at 01:08

## 2022-08-15 RX ADMIN — INSULIN ASPART 3 UNITS: 100 INJECTION, SOLUTION INTRAVENOUS; SUBCUTANEOUS at 05:08

## 2022-08-15 RX ADMIN — BUSPIRONE HYDROCHLORIDE 5 MG: 5 TABLET ORAL at 04:08

## 2022-08-15 NOTE — NURSING
"      SICU PLAN OF CARE NOTE    Dx: Left ventricular assist device (LVAD) complication    Shift Events: K 6. Shift with insulin, Calcium gluconate. Pt c/o dizziness and SOB/ STAT CXR and ABG obtained. Placed back on vent at ths time - settings: Spontaneous 40%/PEEP 5    Goals of Care: MAPs 65-85    Neuro: AAO x4, Follows Commands and Moves All Extremities    Vital Signs: BP (!) 82/0 (BP Location: Right arm, Patient Position: Lying)   Pulse 74   Temp 97.6 °F (36.4 °C) (Oral)   Resp (!) 24   Ht 6' 1" (1.854 m)   Wt 84.4 kg (186 lb)   SpO2 98%   BMI 24.54 kg/m²     Respiratory: Trach Collar 8L 40%    Diet: NPO and Tube Feeds @ 60mL/hr (goal)    Gtts: Heparin @ 23U/kg/hr    Urine Output: Voids Spontaneously 1725 cc/shift    VAD HM 3    Speed: 6300   LSL: 5900   Flows: 5.3-5.6   PI: 1.7-3.7   Power: 5.2-5.4    Labs/Accuchecks: Accuchecks Q 4hr       "

## 2022-08-15 NOTE — ASSESSMENT & PLAN NOTE
Key History and Diagnostic Findings  - Hyperglycemia noted once starting TPN in addition to steroids  - No prior history of diabetes; most recent A1c of 5.4 on 04/26/2021  - Blood sugars tight in last 24 hours    Plan  - will discontinue levemir  - decrease Aspart to 3 units every 4 hour with low correction while on Tube Feeds  - Please notify endocrine if any change in tube feeds as this will change insulin requirements.  - Please hold insulin if tube feeds are held for some reason  - Please ensure that accuchecks are being documented every 4 hours

## 2022-08-15 NOTE — PLAN OF CARE
Pt following commands and using speaking valve appropriately. Trach collar 35%, 8L all day, tolerated well. Heparin gtt infusing per nomogram. LVAD speed 6300, no alarms or issues. Dressing change per orders by RN. Wound care performed per orders. /shift. Tube feeds at 60 (goal). Up to side of bed x2 assist x10 minutes, pt supported himself independently, tolerated well. Plan of care reviewed with pt, verbalizes understanding.

## 2022-08-15 NOTE — NURSING
Pt stating he feels dizzy and SOB. . Dr. Whalen notified. Ordered STAT CXR and ABG. See flowsheets for Abg results. Pt placed on vent - settings: Spontaneous PS 15 FiO2 40% PEEP 5. Pt appears to be sleeping. Will get another ABG in one hour.

## 2022-08-15 NOTE — PLAN OF CARE
Recommendations    1) Continue TF of Peptamen 1.5 with Prebio @ 60 ml/hr to meet estimated kcal/protein needs; provides 2160 kcal, 98g of protein, and 1106 ml water.      - As pressor requirements increase, rec'd trickle feeds for best tolerance. Avoid holding TF for residuals less than 500 ml unless associated with other s/s of intolerance such as N/V/D, abd pain/distention.     2) RD to monitor and f/u.    Goals: Continue to meet % of EEN/EPN by RD f/u date  Nutrition Goal Status: goal met  Communication of RD Recs: other (comment)

## 2022-08-15 NOTE — SUBJECTIVE & OBJECTIVE
"Interval HPI:   fT4 has normalized, 1.46 today    BP (!) 74/0 (BP Location: Right arm, Patient Position: Lying)   Pulse (!) 164   Temp 97.7 °F (36.5 °C) (Oral)   Resp (!) 33   Ht 6' 1" (1.854 m)   Wt 88.5 kg (195 lb)   SpO2 100%   BMI 25.73 kg/m²     Labs Reviewed and Include    Recent Labs   Lab 08/15/22  0346   *   CALCIUM 9.4   ALBUMIN 2.2*   PROT 6.4      K 5.2*  5.2*   CO2 16*   *   BUN 44*   CREATININE 1.1   ALKPHOS 235*   ALT 61*   AST 55*   BILITOT 2.5*     Lab Results   Component Value Date    WBC 24.89 (H) 08/15/2022    HGB 8.3 (L) 08/15/2022    HCT 29 (L) 08/15/2022     (H) 08/15/2022     08/15/2022     Recent Labs   Lab 08/14/22  1936 08/15/22  0346   TSH 0.127*  --    FREET4 1.39 1.46     Lab Results   Component Value Date    HGBA1C 5.4 04/26/2021       Nutritional status:   Body mass index is 25.73 kg/m².  Lab Results   Component Value Date    ALBUMIN 2.2 (L) 08/15/2022    ALBUMIN 2.0 (L) 08/14/2022    ALBUMIN 2.1 (L) 08/14/2022     Lab Results   Component Value Date    PREALBUMIN 32 08/11/2022    PREALBUMIN 28 08/05/2022    PREALBUMIN 13 (L) 07/29/2022       Estimated Creatinine Clearance: 85.8 mL/min (based on SCr of 1.1 mg/dL).    Accu-Checks  Recent Labs     08/14/22  2109 08/14/22  2130 08/14/22  2147 08/14/22  2201 08/14/22  2239 08/14/22  2342 08/15/22  0125 08/15/22  0347 08/15/22  0803 08/15/22  0804   POCTGLUCOSE 121* 137* 86 154* 110 111* 130* 138* 35* 105       Current Medications and/or Treatments Impacting Glycemic Control  Immunotherapy:    Immunosuppressants       None          Steroids:   Hormones (From admission, onward)                Start     Stop Route Frequency Ordered    08/11/22 0900  predniSONE tablet 40 mg         -- PER NG TUBE Daily 08/10/22 0931    08/02/22 1931  melatonin tablet 6 mg         -- OG Nightly PRN 08/02/22 1832          Pressors:    Autonomic Drugs (From admission, onward)                Start     Stop Route Frequency " Ordered    07/29/22 0011  EPINEPHrine (ADRENALIN) 1 mg/mL injection        Note to Pharmacy: Created by cabinet override    07/29 1214   07/29/22 0011          Hyperglycemia/Diabetes Medications:   Antihyperglycemics (From admission, onward)                Start     Stop Route Frequency Ordered    08/11/22 0000  insulin aspart U-100 pen 4 Units         -- SubQ 6 times per day 08/10/22 2055    08/03/22 2100  insulin detemir U-100 pen 6 Units         -- SubQ Nightly 08/03/22 1900    07/30/22 1023  insulin aspart U-100 pen 0-5 Units         -- SubQ Every 4 hours PRN 07/30/22 0976

## 2022-08-15 NOTE — ASSESSMENT & PLAN NOTE
Key History and Diagnostic Findings  - History of AIT type 2 (TRAb and TSI negative; Thyroid US unremarkable with low vascularity)  - Weaned off methimazole and prednisone by May 2020, then developed hypothyroidism, Levothyroxine started and dose titrated per TFTs. Prior to current admission he was on Levothyroxine 112 mcg daily  - Labs consistent with hypothyroidism until current admission, initially on 7/13, with low TSH and elevated fT4. Repeat TFTs on 7/27 with worsening hyperthyroidism. He continued to receive LT4 112 mcg through 7/28. He remains on amiodarone for episodes of Ventricular Tachycardia  - Suspect current hyperthyroidism is likely due to AIT-2, however continued exogenous LT4 certainly contribute to current presentation   - Free T4 today at 1.46    Lab Results   Component Value Date    TSH 0.127 (L) 08/14/2022    TSH <0.010 (L) 07/27/2022    TSH 0.111 (L) 07/13/2022    FREET4 1.46 08/15/2022    FREET4 1.39 08/14/2022    FREET4 1.43 08/14/2022     Plan  - Strongly suspect AIT type 2 with improvement seen currently with the use of prednisone.   - Heparin can cause a false elevation in free T4 as it displaces free T4 from TBG; will follow-up direct dialysis result which is still pending from 8.5.22  - Decreasing Methimazole to 5 mg daily  - Continue Prednisone to 40 mg daily  - Check free T4 on 8.19.2022

## 2022-08-15 NOTE — PROGRESS NOTES
[facility]  Heart Failure  Progress Note     Hospital Length of Stay: 49  Interval History:  - Continue tube feeding.   - Working with PT/OT/SLP.   - Had an episode of dyspnea overnight however the trach valve was in place.     Vitals:  Temp:  [97.5 °F (36.4 °C)-98.1 °F (36.7 °C)] 97.7 °F (36.5 °C)  Pulse:  [] 164  Resp:  [17-45] 33  SpO2:  [96 %-100 %] 100 %  BP: (68-82)/(0) 74/0  Arterial Line BP: ()/() 80/60    Intake/Output    Intake/Output Summary (Last 24 hours) at 8/15/2022 1028  Last data filed at 8/15/2022 0900  Gross per 24 hour   Intake 2623.7 ml   Output 2500 ml   Net 123.7 ml     Physical Exam  Gen: Trach in place. No evidence of bleeding. Alert and awake.   HEENT: Pupils equal and reactive to light.   Cardio: Regular rate, VAD hum obstructing heart sounds  Resp: No additional sound heard.   Abd: Soft, non-tender, non-distended  Skin: Warm,  trace peripheral edema  Neuro: No gross abnormalities.   Psych: Normal mood and affect.      Labs:  Recent Labs   Lab 08/14/22  1148 08/14/22 1936 08/14/22 2110 08/14/22  2202 08/15/22  0346 08/15/22  0357   WBC 29.17* 23.72*  --   --  24.89*  --    HGB 8.4* 8.2*  --   --  8.3*  --    HCT 30.0* 29.0*   < > 27* 29.7* 29*    182  --   --  177  --     < > = values in this interval not displayed.     Recent Labs   Lab 08/14/22 1936 08/14/22 2238 08/14/22  2343 08/15/22  0346 08/15/22  0903   NA  --    < > 136 139 141   K  --    < > 5.5*  5.5* 5.2*  5.2* 4.8  4.8   CL  --    < > 115* 115* 116*   CO2  --    < > 14* 16* 18*   BUN  --    < > 42* 44* 42*   CREATININE  --    < > 1.1 1.1 1.1   GLU  --    < > 109 130* 108   CALCIUM  --    < > 9.2 9.4 9.2   MG 2.6  --  2.6 2.7*  --    PHOS 3.3  --  2.8 3.0  --     < > = values in this interval not displayed.       Micro:    Current Meds:   acetylcysteine 100 mg/ml (10%)  4 mL Nebulization Q6H    amiodarone  400 mg Oral Daily    aspirin  81 mg Per OG tube Daily    busPIRone   5 mg Per NG tube TID    dextrose 10 % in water (D10W)  25 g Intravenous Once    docusate  100 mg Per NG tube Daily    insulin aspart U-100  3 Units Subcutaneous 6 times per day    levalbuterol  0.63 mg Nebulization Q6H    magnesium oxide  400 mg Oral BID    meropenem (MERREM) IVPB  2 g Intravenous Q8H    [START ON 8/16/2022] methIMAzole  5 mg Per NG tube Daily    mexiletine  400 mg Oral Q8H    mirtazapine  15 mg Oral QHS    ondansetron  4 mg Intravenous Once    pantoprazole  40 mg Per NG tube Daily    polyethylene glycol  17 g Per NG tube Daily    predniSONE  40 mg Per NG tube Daily       Continuous Infusions:   dextrose 10 % in water (D10W)      heparin (porcine) in D5W 23 Units/kg/hr (08/15/22 0900)     Assessment and Plan:  Cardiogenic Shock  -Previous HM2, underwent pump exchange to HM3 on 7/13/22 complicated by worsening CS/RV failure requiring VA ECMO now decannulated  Currently trach ed. Chest tube removal, bronch, and old driveline removed 7/22.  -Re-intubated on 7/29/22 due to hypercapnic resp failure. Trach placed on 8/4.   -Doing well on Trach collar.   -Off diuretics currently. Creatinine has trended down to baseline.      Trach site bleeding: Resolved.   - Continue minimal dosing heparin.   - Surgical team placed a suture on 8/11.       LVAD:  - No significant events.   - Functioning well.     Fever: < than a week ago.   Leucocytosis trending down albeit slowly.   ID team on board.   Changed to meropenem . Plan to continue meropenem for 7 days.   No growth in cultures yet.      History of ventricular tachycardia/Episode of A flutter 2:1 block:  Patient experienced an episode of VT on 6/30 and experienced an ICD shock thought to be related to hypokalemia (K of 3.1 on 6/30)  Aggressively replete K, keep K>4 and Mg>2  Continue mexiletine PO.  Amio gtt transitioned to PO.     COVID-19 virus infection:  -Previously hypoxic then recovered  -ID was consulted, recommended Remdesivir, course  completed     Elevated serum lactate dehydrogenase (LDH):  S/p HM3 exchange for HM2 pump thrombosis  Continue to trend LDH.      Amiodarone induced hypothyrodism initially, now hyperthyroidism:  -Endocrine team on board.  -On prednisone and methimazole.   - Prednisone 40 mg.   -Continue to monitor free T4. Trending down.   - Medications has been changes as per Endocrinology team.            Scot Card MD, FACP  Cardiovascular Disease Fellow PGY4  [facility]

## 2022-08-15 NOTE — SUBJECTIVE & OBJECTIVE
"    Family History       Problem Relation (Age of Onset)    Cancer Sister (54)    Coronary artery disease Father    Diabetes Father    Heart disease Father    Hypertension Father    No Known Problems Mother, Brother          Tobacco Use    Smoking status: Former Smoker     Packs/day: 1.00     Years: 31.00     Pack years: 31.00     Types: Cigarettes     Quit date: 2018     Years since quittin.5    Smokeless tobacco: Never Used    Tobacco comment: pt is quiting on his own - pt stated not qualified for program;  pt  quit on his own   Substance and Sexual Activity    Alcohol use: No     Alcohol/week: 0.0 standard drinks     Comment: quit    Drug use: No    Sexual activity: Yes     Partners: Female     Birth control/protection: None     Comment: 10/5/17  with same partner 7 years      Psychotherapeutics (From admission, onward)                Start     Stop Route Frequency Ordered    22 1500  busPIRone tablet 5 mg         -- PER NG TUBE 3 times daily 22 1224    22 1323  ALPRAZolam tablet 1 mg         -- PER NG TUBE Daily PRN 22 1224    22 2100  mirtazapine tablet 15 mg         -- Oral Nightly 22 1121             Review of Systems  Objective:     Vital Signs (Most Recent):  Temp: 97.7 °F (36.5 °C) (08/15/22 0715)  Pulse: (!) 164 (08/15/22 0930)  Resp: (!) 33 (08/15/22 0930)  BP: (!) 74/0 (08/15/22 0815)  SpO2: 100 % (08/15/22 0802)   Vital Signs (24h Range):  Temp:  [97.5 °F (36.4 °C)-98.1 °F (36.7 °C)] 97.7 °F (36.5 °C)  Pulse:  [] 164  Resp:  [17-45] 33  SpO2:  [96 %-100 %] 100 %  BP: (68-82)/(0) 74/0  Arterial Line BP: ()/() 80/60     Height: 6' 1" (185.4 cm)  Weight: 88.5 kg (195 lb)  Body mass index is 25.73 kg/m².      Intake/Output Summary (Last 24 hours) at 8/15/2022 1033  Last data filed at 8/15/2022 0900  Gross per 24 hour   Intake 2623.7 ml   Output 2500 ml   Net 123.7 ml       Significant Labs: All pertinent labs within the past 24 hours " "have been reviewed.    Significant Imaging: I have reviewed all pertinent imaging results/findings within the past 24 hours.    Mental Status Exam:  Appearance: groomed, in hospital gown, sitting on edge of bed  Behavior/Cooperation: cooperative, fair eye contact  Speech: normal rate, rhythm, prasody  Mood: "alright"  Affect: blunted  Thought Process: linear and goal oriented  Thought Content: denies SI, HI, AVH  Orientation: A&Ox4  Memory: recent and remote intact  Attention Span/Concentration: intact  Insight: fair  Judgment: fair  "

## 2022-08-15 NOTE — SIGNIFICANT EVENT
Hyperkalemia   K 6.0          Pt has been persistently hyperkalemic.     Plan;  EKG stat   Calcium gluconate   Dextrose 25 mgs followed by Insulin regular 10 units   Repeat EKG 10 min after treatment  Repeat K in 4 h     Discussed with HTS fellow who agreed with the above assessment and plan.     Harry Whalen   Cardiology PGY 4 [facility]

## 2022-08-15 NOTE — PT/OT/SLP PROGRESS
Occupational Therapy   Treatment    Name: Tim Richards  MRN: [id number]  Admitting Diagnosis:  Left ventricular assist device (LVAD) complication  11 Days Post-Op    Recommendations:     Discharge Recommendations: rehabilitation facility  Discharge Equipment Recommendations:   (TBD)  Barriers to discharge:  None    Assessment:     Tim Richards is a 56 y.o. male with a medical diagnosis of Left ventricular assist device (LVAD) complication.  He presents with Pt sat EOB x 15 minutes with SBA and stood 2x requiring Mod A x 2. Offered to assist pt to [employer] but pt declined. Performance deficits affecting function are weakness, impaired endurance, impaired self care skills, impaired functional mobility, gait instability, impaired balance, decreased coordination, decreased safety awareness, decreased lower extremity function, decreased upper extremity function.     Rehab Prognosis:  Good; patient would benefit from acute skilled OT services to address these deficits and reach maximum level of function.       Plan:     Patient to be seen 5 x/week to address the above listed problems via self-care/home management, therapeutic activities, therapeutic exercises, neuromuscular re-education  · Plan of Care Expires: 08/25/22  · Plan of Care Reviewed with: patient    Subjective     Pain/Comfort:  · Pain Rating 1: 0/10    Objective:     Communicated with: rn prior to session.  Patient found supine with blood pressure cuff, arterial line, sun catheter, LVAD, oxygen, peripheral IV, pulse ox (continuous), telemetry upon OT entry to room.    General Precautions: Standard, fall   Orthopedic Precautions:N/A   Braces:    Respiratory Status: trach collar     Occupational Performance:     Bed Mobility:    · Patient completed Rolling/Turning to Left with  stand by assistance  · Patient completed Scooting/Bridging with contact guard assistance  · Patient completed Supine to Sit with contact guard assistance  · Patient completed  Sit to Supine with moderate assistance     Functional Mobility/Transfers:  · Patient completed Sit <> Stand Transfer with moderate assistance and of 2 persons  with  hand-held assist  (very brief standing time due to LE weakness).    Activities of Daily Living:  · Grooming: stand by assistance sitting EOB.    [facility] 6 Click ADL: 17    Treatment & Education:  From EOB, pt completed BUE AAROM therex (x 10 reps each) including:  - elbow flexion/extension  - straight arm raises  - hor Abd/Add  -lat rows  **Discussed OT POC and progress.    Patient left supine with all lines intact and call button in reachEducation:      GOALS:   Multidisciplinary Problems     Occupational Therapy Goals        Problem: Occupational Therapy    Goal Priority Disciplines Outcome Interventions   Occupational Therapy Goal     OT, PT/OT Ongoing, Progressing    Description: Goals to be met by: 8/9/22     Patient will increase functional independence with ADLs by performing:    UE Dressing with Stand-by Assistance.  LE Dressing with Stand-by Assistance.  Grooming while standing at sink with Stand-by Assistance.  Toileting from toilet with Stand-by Assistance for hygiene and clothing management.   Toilet transfer to toilet with Stand-by Assistance.               Multidisciplinary Problems (Resolved)        Problem: Occupational Therapy    Goal Priority Disciplines Outcome Interventions   Occupational Therapy Goal   (Resolved)     OT, PT/OT Met           Problem: Occupational Therapy    Goal Priority Disciplines Outcome Interventions   Occupational Therapy Goal   (Resolved)     OT, PT/OT Met                    Time Tracking:     OT Date of Treatment: 08/15/22  OT Start Time: 0919  OT Stop Time: 0950  OT Total Time (min): 31 min    Billable Minutes:Therapeutic Activity 16  Therapeutic Exercise 15    OT/CARY: OT          8/15/2022

## 2022-08-15 NOTE — PROGRESS NOTES
[facility] - [facility] Intensive Care  Adult Nutrition  Progress Note    SUMMARY       Recommendations    1) Continue TF of Peptamen 1.5 with Prebio @ 60 ml/hr to meet estimated kcal/protein needs; provides 2160 kcal, 98g of protein, and 1106 ml water.      - As pressor requirements increase, rec'd trickle feeds for best tolerance. Avoid holding TF for residuals less than 500 ml unless associated with other s/s of intolerance such as N/V/D, abd pain/distention.     2) RD to monitor and f/u.    Goals: Continue to meet % of EEN/EPN by RD f/u date  Nutrition Goal Status: goal met  Communication of RD Recs: other (comment)    Assessment and Plan  Nutrition Problem:  Moderate Protein-Calorie Malnutrition  Malnutrition in the context ofAcute Illness/Injury      Related to (etiology):  Inadequate energy intake, NPO, intubation      Signs and Symptoms (as evidenced by):  Energy Intake: <50% of estimated energy requirement x 5 days  Weight Loss: ~9% x <1 month  Fluid Accumulation: mild (1-2+ edema)     Interventions(treatment strategy):  Collaboration of nutrition care with other providers  Enteral nutrition      Nutrition Diagnosis Status:  Continues    Reason for Assessment    Reason For Assessment: RD follow-up  Diagnosis:  (LVAD complication)  Relevant Medical History: CHF, gout, cardiomyopathy  Interdisciplinary Rounds: did not attend    General Information Comments:   Pt continues w/ good tolerance of tube feeds at goal rate - no N/V/D or abd pain noted.     Per chart review, wt fluctuating between 253-265# x 4 months prior to admission. Pt weighing 245# on admit. #. Possible ~8# wt loss prior to admission, and 50# (20%) wt loss during LOS (suspect further weight loss masked by edema; 1+ edema noted at this time).  Pt meets criteria for moderate malnutrition in the context of acute illness/injury - see PES statement for details.     Nutrition Discharge Planning: adequate nutrition intake    Nutrition Risk  "Screen    Nutrition Risk Screen: tube feeding or parenteral nutrition    Nutrition/Diet History    Spiritual, Cultural Beliefs, Pent[employer]al Practices, Values that Affect Care: no  Food Allergies: NKFA    Anthropometrics    Temp: 97.6 °F (36.4 °C)  Height Method: Stated  Height: 6' 1" (185.4 cm)  Height (inches): 73 in  Weight Method: Bed Scale  Weight: 88.5 kg (195 lb)  Weight (lb): 195 lb  Ideal Body Weight (IBW), Male: 184 lb  % Ideal Body Weight, Male (lb): 125.54 %  BMI (Calculated): 25.7  BMI Grade: 25 - 29.9 - overweight    Lab/Procedures/Meds    Pertinent Labs Reviewed: reviewed  Pertinent Labs Comments: K 5.4, BUN 45, Glu 140, Phos 2.5  Pertinent Medications Reviewed: reviewed  Pertinent Medications Comments: prednisone, insulin, pantoprazole, polyethylene glycol, heparin    Estimated/Assessed Needs    Weight Used For Calorie Calculations: 81.8 kg (180 lb 5.4 oz)  Energy Calorie Requirements (kcal): 2132 (PAL 1.25)  Energy Need Method: [facility]  Protein Requirements: 106-122 g (1.3-1.5 g/kg)  Weight Used For Protein Calculations: 81.8 kg (180 lb 5.4 oz)  Fluid Requirements (mL): 1 ml or fluid per MD  RDA Method (mL): 2132    Nutrition Prescription Ordered    Current Diet Order: NPO  Nutrition Order Comments: -  Current Nutrition Support Formula Ordered: Peptamen 1.5 w/Prebio  Current Nutrition Support Rate Ordered: 60 (ml)  Current Nutrition Support Frequency Ordered: ml/hr    Evaluation of Received Nutrient/Fluid Intake    I/O: -8.4L since 8/1  Energy Calories Required: meeting needs  Protein Required: meeting needs  Fluid Required: meeting needs  Comments: LBM: 8/9  Tolerance: tolerating  % Intake of Estimated Energy Needs: 75 - 100 %  % Meal Intake: NPO    Nutrition Risk    Level of Risk/Frequency of Follow-up: low ((1 x/week))     Monitor and Evaluation    Food and Nutrient Intake: energy intake, food and beverage intake, enteral nutrition intake, parenteral nutrition intake  Food and Nutrient " Adminstration: diet order, enteral and parenteral nutrition administration  Knowledge/Beliefs/Attitudes: food and nutrition knowledge/skill  Physical Activity and Function: nutrition-related ADLs and IADLs  Anthropometric Measurements: weight, weight change  Biochemical Data, Medical Tests and Procedures: electrolyte and renal panel, gastrointestinal profile, glucose/endocrine profile, inflammatory profile, lipid profile  Nutrition-Focused Physical Findings: overall appearance, extremities, muscles and bones     Nutrition Follow-Up    RD Follow-up?: Yes

## 2022-08-15 NOTE — PROGRESS NOTES
"[facility]  Endocrinology  Progress Note    Admit Date: 6/27/2022     56 year-old  male with NICM with LVAD, s/p ICD for VT on amiodarone and mexiletine and AIT followed by hypothyroidism initially admitted 6/27/2022 with COVID respiratory failure complicated with LVAD pump thrombosis that was refractory to medical therapy. Underwent LVAD pump exchange. Post-op course complicated by worsening cardiogenic shock/RV failure requiring VA-ECMO. Improved clinically underwent successful decannulation. Continued episodes of VT with ICD shock 7/21 and ongoing anti-arrhythmic mediation adjustments. TFTs on 7/27 significant for recurrent hyperthyroidism with undetectable TSH and elevated fT4.    - Patient re-intubated 07/29/2022    - Endocrinology consulted for recurrent AIT    Regarding AIT:    - Last seen outpatient by Dr. 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 He required a prolonged course of prednisone and methimazole, then subsequently developed hypothyroidism in May 2020. LT4 was adjusted based on serial TFT measurements. Most recently levothyroxine dose has been 112 mcg     - He self-decreased his amiodarone dose to 200 mg daily about 6 months ago. T4 was high on 7/13, but he was continued on levothyroxine 112 mcg    Lab Results   Component Value Date    TSH <0.010 (L) 07/27/2022    TSH 0.111 (L) 07/13/2022    TSH 4.079 (H) 03/17/2022    FREET4 2.51 (H) 08/07/2022    FREET4 2.95 (H) 08/06/2022    FREET4 2.18 (H) 08/05/2022      Latest Reference Range & Units 08/07/22 03:42 08/08/22 03:10 08/09/22 03:29 08/10/22 03:33   Free T4 0.71 - 1.51 ng/dL 2.51 (H) 2.43 (H) 2.23 (H) 2.12 (H)       Interval HPI:   fT4 has normalized, 1.46 today    BP (!) 74/0 (BP Location: Right arm, Patient Position: Lying)   Pulse (!) 164   Temp 97.7 °F (36.5 °C) (Oral)   Resp (!) 33   Ht 6' 1" (1.854 m)   Wt 88.5 kg " (195 lb)   SpO2 100%   BMI 25.73 kg/m²     Labs Reviewed and Include    Recent Labs   Lab 08/15/22  0346   *   CALCIUM 9.4   ALBUMIN 2.2*   PROT 6.4      K 5.2*  5.2*   CO2 16*   *   BUN 44*   CREATININE 1.1   ALKPHOS 235*   ALT 61*   AST 55*   BILITOT 2.5*     Lab Results   Component Value Date    WBC 24.89 (H) 08/15/2022    HGB 8.3 (L) 08/15/2022    HCT 29 (L) 08/15/2022     (H) 08/15/2022     08/15/2022     Recent Labs   Lab 08/14/22  1936 08/15/22  0346   TSH 0.127*  --    FREET4 1.39 1.46     Lab Results   Component Value Date    HGBA1C 5.4 04/26/2021       Nutritional status:   Body mass index is 25.73 kg/m².  Lab Results   Component Value Date    ALBUMIN 2.2 (L) 08/15/2022    ALBUMIN 2.0 (L) 08/14/2022    ALBUMIN 2.1 (L) 08/14/2022     Lab Results   Component Value Date    PREALBUMIN 32 08/11/2022    PREALBUMIN 28 08/05/2022    PREALBUMIN 13 (L) 07/29/2022       Estimated Creatinine Clearance: 85.8 mL/min (based on SCr of 1.1 mg/dL).    Accu-Checks  Recent Labs     08/14/22  2109 08/14/22  2130 08/14/22  2147 08/14/22  2201 08/14/22  2239 08/14/22  2342 08/15/22  0125 08/15/22  0347 08/15/22  0803 08/15/22  0804   POCTGLUCOSE 121* 137* 86 154* 110 111* 130* 138* 35* 105       Current Medications and/or Treatments Impacting Glycemic Control  Immunotherapy:    Immunosuppressants       None          Steroids:   Hormones (From admission, onward)                Start     Stop Route Frequency Ordered    08/11/22 0900  predniSONE tablet 40 mg         -- PER NG TUBE Daily 08/10/22 0931    08/02/22 1931  melatonin tablet 6 mg         -- OG Nightly PRN 08/02/22 1832          Pressors:    Autonomic Drugs (From admission, onward)                Start     Stop Route Frequency Ordered    07/29/22 0011  EPINEPHrine (ADRENALIN) 1 mg/mL injection        Note to Pharmacy: Created by cabinet override    07/29 1214   07/29/22 0011          Hyperglycemia/Diabetes Medications:    Antihyperglycemics (From admission, onward)                Start     Stop Route Frequency Ordered    08/11/22 0000  insulin aspart U-100 pen 4 Units         -- SubQ 6 times per day 08/10/22 2055    08/03/22 2100  insulin detemir U-100 pen 6 Units         -- SubQ Nightly 08/03/22 1900    07/30/22 1023  insulin aspart U-100 pen 0-5 Units         -- SubQ Every 4 hours PRN 07/30/22 0925            ASSESSMENT and PLAN    Amiodarone-induced hyperthyroidism  Key History and Diagnostic Findings  - History of AIT type 2 (TRAb and TSI negative; Thyroid US unremarkable with low vascularity)  - Weaned off methimazole and prednisone by May 2020, then developed hypothyroidism, Levothyroxine started and dose titrated per TFTs. Prior to current admission he was on Levothyroxine 112 mcg daily  - Labs consistent with hypothyroidism until current admission, initially on 7/13, with low TSH and elevated fT4. Repeat TFTs on 7/27 with worsening hyperthyroidism. He continued to receive LT4 112 mcg through 7/28. He remains on amiodarone for episodes of Ventricular Tachycardia  - Suspect current hyperthyroidism is likely due to AIT-2, however continued exogenous LT4 certainly contribute to current presentation   - Free T4 today at 1.46    Lab Results   Component Value Date    TSH 0.127 (L) 08/14/2022    TSH <0.010 (L) 07/27/2022    TSH 0.111 (L) 07/13/2022    FREET4 1.46 08/15/2022    FREET4 1.39 08/14/2022    FREET4 1.43 08/14/2022     Plan  - Strongly suspect AIT type 2 with improvement seen currently with the use of prednisone.   - Heparin can cause a false elevation in free T4 as it displaces free T4 from TBG; will follow-up direct dialysis result which is still pending from 8.5.22  - Decreasing Methimazole to 5 mg daily  - Continue Prednisone to 40 mg daily  - Check free T4 on 8.19.2022    Hyperglycemia  Key History and Diagnostic Findings  - Hyperglycemia noted once starting TPN in addition to steroids  - No prior history of  diabetes; most recent A1c of 5.4 on 04/26/2021  - Blood sugars tight in last 24 hours    Plan  - will discontinue levemir  - decrease Aspart to 3 units every 4 hour with low correction while on Tube Feeds  - Please notify endocrine if any change in tube feeds as this will change insulin requirements.  - Please hold insulin if tube feeds are held for some reason  - Please ensure that accuchecks are being documented every 4 hours    Chronic combined systolic and diastolic heart failure  Managed per cardiology          Poncho Guajardo MD  Endocrinology  [facility]

## 2022-08-15 NOTE — NURSING
Blood glucose rechecked, results 86. Per Dr. Whalen to give 125cc D10 bolus. Repeat BG after bolus - 150.

## 2022-08-15 NOTE — PROGRESS NOTES
[facility]  Psychiatry  Progress Note    Patient Name: Tim Richards  MRN: [id number]   Code Status: Full Code  Admission Date: 6/27/2022  Hospital Length of Stay: 49 days  Expected Discharge Date: 8/31/2022  Attending Physician: Amy Rhodes MD  Primary Care Provider: Deyanira Booth MD    Current Legal Status: Uncontested    Patient information was obtained from patient and past medical records.       Subjective:     Patient is a 56 y.o., male, presents with:    Principal Problem:Left ventricular assist device (LVAD) complication    Chief Complaint: None    HPI:   Tim Richards is a 56 y.o. male with a past psychiatric history of generalized anxiety disorder, adjustment disorder, and insomnia who presented to [facility] due to Left ventricular assist device (LVAD) complication. Psychiatry was consulted for depression.    Per Primary Team:  56-yo M with hx of Stage D HFrEF (NICMP, EF 10%, previously NYHA II), s/p  HM2 implanted in march/2018, SC ICD, VT on amiodarone and mexiletine and amiodarone induced hypothyroidism. Pt refers that he was in his usual state until 3-4 days ago when he visited a family member's house and had dietary indiscretions. Refers that since then he has had low appetite. Also states today he started to notice a dark urine and increasing SOB so he decided to come to the ED. He denies palpitaitons, ICD shocks, but refers some chest tightness.   He also refers chills and soft stools for the past 2 days.  Denied episodes of bleeding.     Per 7/11 Psychiatry Consult:  On interview with resident, patient was somewhat guarded as he had already talked with the attending psychiatrist who is his outpatient provider. He did complain of poor sleep, which he attributed to not being on his home medication and being woken up throughout the night by hospital staff. Mentioned some anxiety over his upcoming surgery. Denied any depressed mood. Patient denied a history of anxiety  and reported that he is unsure why he takes Xanax. He denied any past psychiatric hospitalizations and any other psychiatric diagnoses other than insomnia. Patient did mention that he follows with Dr. Krueger and that he plans to continue receiving outpatient care with him after discharge.     Per 8/13 Psychiatry Consult:  Patient seen resting in bed comfortably, states that he has been feeling depressed with being stuck in bed 24/7 as he has been hospitalized since 6/27. Voices that he has some interest in starting on some medications for depressed mood at this time. Denies being on any psychiatric medications for mood besides some xanax by Dr. Krueger. He has been feeling disappointed after he was unable to pass the barium swallow. Denies SI, HI, AVH.    Psychiatric Review of Systems-is patient experiencing or having changes in  sleep: yes  appetite: no  weight: no  energy/anergy: yes  interest/pleasure/anhedonia: no  somatic symptoms: no  libido: did not assess  anxiety/panic: yes  guilty/hopelessness: yes  concentration: no  S.I.B.s/risky behavior: no  any drugs: no  alcohol: no     Allergies:  Lisinopril and Hydralazine analogues    Past Medical/Surgical History:  Past Medical History:   Diagnosis Date    Anticoagulant long-term use     CHF (congestive heart failure)     Class 1 obesity due to excess calories with serious comorbidity and body mass index (BMI) of 31.0 to 31.9 in adult     Dilated cardiomyopathy 1/10/2018    Disorder of kidney and ureter     CKD    Encounter for blood transfusion     Gout     HTN (hypertension)     Hx of psychiatric care     ICD (implantable cardioverter-defibrillator) infection 7/1/2020    Psychiatric problem     Thyroid disease     Ventricular tachycardia (paroxysmal)      Past Surgical History:   Procedure Laterality Date    CARDIAC CATHETERIZATION  Dec. 2012    CARDIAC DEFIBRILLATOR PLACEMENT Left     CRRT-D    COLONOSCOPY N/A 3/6/2018    Procedure:  COLONOSCOPY;  Surgeon: Alonso Bone MD;  Location: [facility] ENDO (2ND FLR);  Service: Endoscopy;  Laterality: N/A;    COLONOSCOPY N/A 7/17/2019    Procedure: COLONOSCOPY;  Surgeon: Blane Valdez MD;  Location: [facility] ENDO (2ND FLR);  Service: Endoscopy;  Laterality: N/A;    COLONOSCOPY N/A 7/18/2019    Procedure: COLONOSCOPY;  Surgeon: Blane Valdez MD;  Location: [facility] ENDO (2ND FLR);  Service: Endoscopy;  Laterality: N/A;    ESOPHAGOGASTRODUODENOSCOPY N/A 7/17/2019    Procedure: EGD (ESOPHAGOGASTRODUODENOSCOPY);  Surgeon: Blane Valdez MD;  Location: [facility] ENDO (2ND FLR);  Service: Endoscopy;  Laterality: N/A;    ESOPHAGOGASTRODUODENOSCOPY N/A 7/18/2019    Procedure: EGD (ESOPHAGOGASTRODUODENOSCOPY);  Surgeon: Blane Valdez MD;  Location: [facility] ENDO (2ND FLR);  Service: Endoscopy;  Laterality: N/A;    NONINVASIVE CARDIAC ELECTROPHYSIOLOGY STUDY N/A 10/18/2019    Procedure: CARDIAC ELECTROPHYSIOLOGY STUDY, NONINVASIVE;  Surgeon: Raz Wagner MD;  Location: [facility] EP LAB;  Service: Cardiology;  Laterality: N/A;  VT, DFTs, MDT CRTD in situ, LVAD, LASHELL pizarro, 3098    REPLACEMENT OF IMPLANTABLE CARDIOVERTER-DEFIBRILLATOR (ICD) GENERATOR N/A 3/9/2020    Procedure: REPLACEMENT, ICD GENERATOR;  Surgeon: Harry Yun MD;  Location: [facility] EP LAB;  Service: Cardiology;  Laterality: N/A;  VT, ICD Gen Change and Lead Revision, MDT, MAC, DM,3 Prep    REVISION OF IMPLANTABLE CARDIOVERTER-DEFIBRILLATOR (ICD) ELECTRODE LEAD PLACEMENT N/A 3/9/2020    Procedure: REVISION, INSERTION, ELECTRODE LEAD, ICD;  Surgeon: Harry Yun MD;  Location: [facility] EP LAB;  Service: Cardiology;  Laterality: N/A;  VT, ICD Gen Change and Lead Revision, MDT, MAC, DM,3 Prep    TREATMENT OF CARDIAC ARRHYTHMIA  10/18/2019    Procedure: Cardioversion or Defibrillation;  Surgeon: Raz Wagner MD;  Location: [facility] EP LAB;  Service: Cardiology;;       Past Psychiatric History:  Previous Medication Trials: yes, xanax, ambien  Previous Psychiatric  "Hospitalizations: no   Previous Suicide Attempts: no   History of Violence: unknown  Outpatient Psychiatrist: yes, Dr. Krueger     Social History:  Marital Status:   Children: 3   Employment Status/Info: on disability  Education:  college  Special Ed: no  Housing Status: with family   History of phys/sexual abuse: unknown  Access to gun: no    Substance Abuse History:  Recreational Drugs:  denied  Use of Alcohol: denied  Rehab History: unknown   Tobacco Use: no  Use of OTC: denied    Legal History:  Past Charges/Incarcerations: unknown   Pending charges: unknown     Family Psychiatric History:   unknown    Psychosocial Stressors: health  Functioning Relationships: good support system        Hospital Course: 8/14:  Teresita. Had some CP but EKG normal and not requiring nitro. Required prn zofran for emesis. Had PM dose of Remeron, no Buspar or Xanax PRNs ordered.    On evaluation this AM the patient is in a calm and cooperative mood, staring out the window but making eye contact on questioning. He says he slept well last night for 5 hours. He does not claim SI and has minimal interaction with staff, appearing tired. Per staff he slept a lot during the day. He is counseled on the importance of staying active while in hospital and he nods, he says "the suns out". He is also counseled on his Xanax being used with the Remeron, vs. The Ambien and he voices understanding. He has no questions for staff.    8/15:  Pt had elevated glucose and hyperkalemia overnight requiring fluid bolus and lasix. Required PRN hydroxyzine and melatonin for sleep as well. Did not receive PRN Xanax.    Patient seen working with PT this morning. He is calm and cooperative. States that he slept "off and on" last night and is not sure how many hours he slept. States that this is due to to being in bed "24/7". Reports that he feels tired and denies any significant change in his sleep. He denies any problems with Remeron or Buspar. Denies " "any complaints or questions at this time.           Family History       Problem Relation (Age of Onset)    Cancer Sister (54)    Coronary artery disease Father    Diabetes Father    Heart disease Father    Hypertension Father    No Known Problems Mother, Brother          Tobacco Use    Smoking status: Former Smoker     Packs/day: 1.00     Years: 31.00     Pack years: 31.00     Types: Cigarettes     Quit date: 2018     Years since quittin.5    Smokeless tobacco: Never Used    Tobacco comment: pt is quiting on his own - pt stated not qualified for program;  pt  quit on his own   Substance and Sexual Activity    Alcohol use: No     Alcohol/week: 0.0 standard drinks     Comment: quit    Drug use: No    Sexual activity: Yes     Partners: Female     Birth control/protection: None     Comment: 10/5/17  with same partner 7 years      Psychotherapeutics (From admission, onward)                Start     Stop Route Frequency Ordered    22 1500  busPIRone tablet 5 mg         -- PER NG TUBE 3 times daily 22 1224    22 1323  ALPRAZolam tablet 1 mg         -- PER NG TUBE Daily PRN 22 1224    22 2100  mirtazapine tablet 15 mg         -- Oral Nightly 22 1121             Review of Systems  Objective:     Vital Signs (Most Recent):  Temp: 97.7 °F (36.5 °C) (08/15/22 0715)  Pulse: (!) 164 (08/15/22 0930)  Resp: (!) 33 (08/15/22 0930)  BP: (!) 74/0 (08/15/22 0815)  SpO2: 100 % (08/15/22 0802)   Vital Signs (24h Range):  Temp:  [97.5 °F (36.4 °C)-98.1 °F (36.7 °C)] 97.7 °F (36.5 °C)  Pulse:  [] 164  Resp:  [17-45] 33  SpO2:  [96 %-100 %] 100 %  BP: (68-82)/(0) 74/0  Arterial Line BP: ()/() 80/60     Height: 6' 1" (185.4 cm)  Weight: 88.5 kg (195 lb)  Body mass index is 25.73 kg/m².      Intake/Output Summary (Last 24 hours) at 8/15/2022 1033  Last data filed at 8/15/2022 0900  Gross per 24 hour   Intake 2623.7 ml   Output 2500 ml   Net 123.7 ml " "      Significant Labs: All pertinent labs within the past 24 hours have been reviewed.    Significant Imaging: I have reviewed all pertinent imaging results/findings within the past 24 hours.    Mental Status Exam:  Appearance: groomed, in hospital gown, sitting on edge of bed  Behavior/Cooperation: cooperative, fair eye contact  Speech: normal rate, rhythm, prasody  Mood: "alright"  Affect: blunted  Thought Process: linear and goal oriented  Thought Content: denies SI, HI, AVH  Orientation: A&Ox4  Memory: recent and remote intact  Attention Span/Concentration: intact  Insight: fair  Judgment: fair       Scheduled Medications:   acetylcysteine 100 mg/ml (10%)  4 mL Nebulization Q6H    amiodarone  400 mg Oral Daily    aspirin  81 mg Per OG tube Daily    busPIRone  5 mg Per NG tube TID    dextrose 10 % in water (D10W)  25 g Intravenous Once    docusate  100 mg Per NG tube Daily    insulin aspart U-100  3 Units Subcutaneous 6 times per day    levalbuterol  0.63 mg Nebulization Q6H    magnesium oxide  400 mg Oral BID    meropenem (MERREM) IVPB  2 g Intravenous Q8H    [START ON 8/16/2022] methIMAzole  5 mg Per NG tube Daily    mexiletine  400 mg Oral Q8H    mirtazapine  15 mg Oral QHS    ondansetron  4 mg Intravenous Once    pantoprazole  40 mg Per NG tube Daily    polyethylene glycol  17 g Per NG tube Daily    predniSONE  40 mg Per NG tube Daily       PRN Medications:  sodium chloride 0.9%, sodium chloride 0.9%, acetaminophen, ALPRAZolam, artificial tears, barium sulfate, barium sulfate, bisacodyL, [COMPLETED] calcium gluconate IVPB **AND** calcium gluconate IVPB, [COMPLETED] calcium gluconate IVPB **AND** calcium gluconate IVPB, dextrose 10 % in water (D10W), dextrose 10 % in water (D10W) **AND** dextrose 10 % in water (D10W) **AND** [COMPLETED] insulin regular, dextrose 10%, dextrose 10%, glucagon (human recombinant), glucose, guaiFENesin 100 mg/5 ml, HYDROmorphone, HYDROmorphone, hydrOXYzine HCL, " insulin aspart U-100, melatonin, nitroGLYCERIN    Review of patient's allergies indicates:   Allergen Reactions    Lisinopril Anaphylaxis    Hydralazine analogues      Chronic constipation, impotence, dizziness       Assessment/Plan:     Adjustment disorder with mixed anxiety and depressed mood  Tim Richards is a 56 y.o. male with a past psychiatric history of generalized anxiety disorder, adjustment disorder, and insomnia who presented to [facility] due to Left ventricular assist device (LVAD) complication. Psychiatry was consulted for depression. Remeron initiated 8/12, pt states that he continues to have poor sleep as well as continued severe anxiety.     IMPRESSION  Insomnia   Adjustment Disorder with mixed anxiety and depressed mood     - Continue Remeron 15mg qhs; plan to continue to assess efficacy of remeron for sleep; consider switch to home ambien CR 12.5mg PRN qhs  - Continue home xanax 1mg PRN daily  - Continue buspar 5mg TID  - 8/14 EKG QTc 379  - Please limit nighttime interruptions as much as possible.   - Patient does not meet criteria for PEC or inpatient psychiatric admission at this time. Patient is not currently an imminent danger to self or others and is not gravely disabled due to a psychiatric illness.         Need for Continued Hospitalization:  No need for inpatient psychiatric hospitalization. Continue medical care as per the primary team.      Total time:  15 with greater than 50% of this time spent in counseling and/or coordination of care.       Roxy Arita MD   Psychiatry  [facility]

## 2022-08-15 NOTE — PT/OT/SLP PROGRESS
Speech Language Pathology      Tim Richards  MRN: [id number]    Attempted to see pt this AM for ongoing dysphagia treatment. Pt with speaking valve in place and significantly drowsy difficulty maintaining and awake and alert state. RN at the bedside and pt able to be roused with tactile stimulation. RN removed speaking valve and pt transitioning to asleep state. Per RN report pt fatigued from recently working with PT and having difficult time sleeping at night. SLP discussed will re-attempt as able this PM otherwise plan to resume treatment tomorrow 08/16/22.      Ayesha Vora MS, CCC-SLP  Speech Language Pathologist  Pager: [phone]  Date 8/15/2022

## 2022-08-16 LAB
ALBUMIN SERPL BCP-MCNC: 2.2 G/DL (ref 3.5–5.2)
ALBUMIN SERPL BCP-MCNC: 2.2 G/DL (ref 3.5–5.2)
ALLENS TEST: ABNORMAL
ALP SERPL-CCNC: 224 U/L (ref 55–135)
ALP SERPL-CCNC: 226 U/L (ref 55–135)
ALT SERPL W/O P-5'-P-CCNC: 59 U/L (ref 10–44)
ALT SERPL W/O P-5'-P-CCNC: 61 U/L (ref 10–44)
ANION GAP SERPL CALC-SCNC: 11 MMOL/L (ref 8–16)
ANION GAP SERPL CALC-SCNC: 7 MMOL/L (ref 8–16)
ANION GAP SERPL CALC-SCNC: 7 MMOL/L (ref 8–16)
ANION GAP SERPL CALC-SCNC: 8 MMOL/L (ref 8–16)
ANION GAP SERPL CALC-SCNC: 9 MMOL/L (ref 8–16)
APTT BLDCRRT: 45.1 SEC (ref 21–32)
APTT BLDCRRT: 46.4 SEC (ref 21–32)
AST SERPL-CCNC: 53 U/L (ref 10–40)
AST SERPL-CCNC: 53 U/L (ref 10–40)
BACTERIA SPEC ANAEROBE CULT: NORMAL
BASOPHILS # BLD AUTO: 0.03 K/UL (ref 0–0.2)
BASOPHILS # BLD AUTO: 0.03 K/UL (ref 0–0.2)
BASOPHILS # BLD AUTO: 0.04 K/UL (ref 0–0.2)
BASOPHILS NFR BLD: 0.1 % (ref 0–1.9)
BASOPHILS NFR BLD: 0.2 % (ref 0–1.9)
BASOPHILS NFR BLD: 0.2 % (ref 0–1.9)
BILIRUB DIRECT SERPL-MCNC: 1.7 MG/DL (ref 0.1–0.3)
BILIRUB SERPL-MCNC: 2.4 MG/DL (ref 0.1–1)
BILIRUB SERPL-MCNC: 2.4 MG/DL (ref 0.1–1)
BUN SERPL-MCNC: 43 MG/DL (ref 6–20)
BUN SERPL-MCNC: 43 MG/DL (ref 6–20)
BUN SERPL-MCNC: 44 MG/DL (ref 6–20)
BUN SERPL-MCNC: 45 MG/DL (ref 6–20)
BUN SERPL-MCNC: 45 MG/DL (ref 6–20)
CALCIUM SERPL-MCNC: 9 MG/DL (ref 8.7–10.5)
CALCIUM SERPL-MCNC: 9.1 MG/DL (ref 8.7–10.5)
CALCIUM SERPL-MCNC: 9.2 MG/DL (ref 8.7–10.5)
CALCIUM SERPL-MCNC: 9.4 MG/DL (ref 8.7–10.5)
CALCIUM SERPL-MCNC: 9.7 MG/DL (ref 8.7–10.5)
CHLORIDE SERPL-SCNC: 116 MMOL/L (ref 95–110)
CHLORIDE SERPL-SCNC: 117 MMOL/L (ref 95–110)
CO2 SERPL-SCNC: 14 MMOL/L (ref 23–29)
CO2 SERPL-SCNC: 15 MMOL/L (ref 23–29)
CO2 SERPL-SCNC: 16 MMOL/L (ref 23–29)
CO2 SERPL-SCNC: 18 MMOL/L (ref 23–29)
CO2 SERPL-SCNC: 19 MMOL/L (ref 23–29)
CREAT SERPL-MCNC: 1.1 MG/DL (ref 0.5–1.4)
CREAT SERPL-MCNC: 1.1 MG/DL (ref 0.5–1.4)
CREAT SERPL-MCNC: 1.2 MG/DL (ref 0.5–1.4)
CREAT SERPL-MCNC: 1.3 MG/DL (ref 0.5–1.4)
CREAT SERPL-MCNC: 1.4 MG/DL (ref 0.5–1.4)
DIFFERENTIAL METHOD: ABNORMAL
EOSINOPHIL # BLD AUTO: 0 K/UL (ref 0–0.5)
EOSINOPHIL NFR BLD: 0 % (ref 0–8)
EOSINOPHIL NFR BLD: 0.1 % (ref 0–8)
EOSINOPHIL NFR BLD: 0.1 % (ref 0–8)
ERYTHROCYTE [DISTWIDTH] IN BLOOD BY AUTOMATED COUNT: 19.4 % (ref 11.5–14.5)
ERYTHROCYTE [DISTWIDTH] IN BLOOD BY AUTOMATED COUNT: 19.9 % (ref 11.5–14.5)
ERYTHROCYTE [DISTWIDTH] IN BLOOD BY AUTOMATED COUNT: 19.9 % (ref 11.5–14.5)
EST. GFR  (NO RACE VARIABLE): 59.4 ML/MIN/1.73 M^2
EST. GFR  (NO RACE VARIABLE): >60 ML/MIN/1.73 M^2
FIBRINOGEN PPP-MCNC: 506 MG/DL (ref 182–400)
GLUCOSE SERPL-MCNC: 108 MG/DL (ref 70–110)
GLUCOSE SERPL-MCNC: 143 MG/DL (ref 70–110)
GLUCOSE SERPL-MCNC: 144 MG/DL (ref 70–110)
GLUCOSE SERPL-MCNC: 146 MG/DL (ref 70–110)
GLUCOSE SERPL-MCNC: 79 MG/DL (ref 70–110)
HCO3 UR-SCNC: 22.8 MMOL/L (ref 24–28)
HCT VFR BLD AUTO: 29.9 % (ref 40–54)
HCT VFR BLD AUTO: 30.3 % (ref 40–54)
HCT VFR BLD AUTO: 31.3 % (ref 40–54)
HCT VFR BLD CALC: 27 %PCV (ref 36–54)
HGB BLD-MCNC: 8.5 G/DL (ref 14–18)
HGB BLD-MCNC: 8.5 G/DL (ref 14–18)
HGB BLD-MCNC: 8.7 G/DL (ref 14–18)
IMM GRANULOCYTES # BLD AUTO: 0.58 K/UL (ref 0–0.04)
IMM GRANULOCYTES # BLD AUTO: 0.77 K/UL (ref 0–0.04)
IMM GRANULOCYTES # BLD AUTO: 0.99 K/UL (ref 0–0.04)
IMM GRANULOCYTES NFR BLD AUTO: 3.3 % (ref 0–0.5)
IMM GRANULOCYTES NFR BLD AUTO: 3.7 % (ref 0–0.5)
IMM GRANULOCYTES NFR BLD AUTO: 4.7 % (ref 0–0.5)
INR PPP: 1 (ref 0.8–1.2)
LYMPHOCYTES # BLD AUTO: 0.5 K/UL (ref 1–4.8)
LYMPHOCYTES # BLD AUTO: 0.6 K/UL (ref 1–4.8)
LYMPHOCYTES # BLD AUTO: 0.8 K/UL (ref 1–4.8)
LYMPHOCYTES NFR BLD: 2.2 % (ref 18–48)
LYMPHOCYTES NFR BLD: 3.3 % (ref 18–48)
LYMPHOCYTES NFR BLD: 3.8 % (ref 18–48)
MAGNESIUM SERPL-MCNC: 2.7 MG/DL (ref 1.6–2.6)
MAGNESIUM SERPL-MCNC: 2.7 MG/DL (ref 1.6–2.6)
MAGNESIUM SERPL-MCNC: 2.8 MG/DL (ref 1.6–2.6)
MCH RBC QN AUTO: 28.2 PG (ref 27–31)
MCH RBC QN AUTO: 28.3 PG (ref 27–31)
MCH RBC QN AUTO: 28.4 PG (ref 27–31)
MCHC RBC AUTO-ENTMCNC: 27.8 G/DL (ref 32–36)
MCHC RBC AUTO-ENTMCNC: 28.1 G/DL (ref 32–36)
MCHC RBC AUTO-ENTMCNC: 28.4 G/DL (ref 32–36)
MCV RBC AUTO: 100 FL (ref 82–98)
MCV RBC AUTO: 101 FL (ref 82–98)
MCV RBC AUTO: 101 FL (ref 82–98)
MONOCYTES # BLD AUTO: 0.7 K/UL (ref 0.3–1)
MONOCYTES # BLD AUTO: 0.7 K/UL (ref 0.3–1)
MONOCYTES # BLD AUTO: 1.3 K/UL (ref 0.3–1)
MONOCYTES NFR BLD: 3.3 % (ref 4–15)
MONOCYTES NFR BLD: 4.1 % (ref 4–15)
MONOCYTES NFR BLD: 5.9 % (ref 4–15)
NEUTROPHILS # BLD AUTO: 15.8 K/UL (ref 1.8–7.7)
NEUTROPHILS # BLD AUTO: 17.9 K/UL (ref 1.8–7.7)
NEUTROPHILS # BLD AUTO: 18.9 K/UL (ref 1.8–7.7)
NEUTROPHILS NFR BLD: 85.3 % (ref 38–73)
NEUTROPHILS NFR BLD: 89.1 % (ref 38–73)
NEUTROPHILS NFR BLD: 90.6 % (ref 38–73)
NRBC BLD-RTO: 0 /100 WBC
PCO2 BLDA: 40.3 MMHG (ref 35–45)
PH SMN: 7.36 [PH] (ref 7.35–7.45)
PHOSPHATE SERPL-MCNC: 2.5 MG/DL (ref 2.7–4.5)
PHOSPHATE SERPL-MCNC: 2.6 MG/DL (ref 2.7–4.5)
PHOSPHATE SERPL-MCNC: 2.9 MG/DL (ref 2.7–4.5)
PLATELET # BLD AUTO: 160 K/UL (ref 150–450)
PLATELET # BLD AUTO: 161 K/UL (ref 150–450)
PLATELET # BLD AUTO: 182 K/UL (ref 150–450)
PMV BLD AUTO: 12.2 FL (ref 9.2–12.9)
PMV BLD AUTO: 12.4 FL (ref 9.2–12.9)
PMV BLD AUTO: 12.8 FL (ref 9.2–12.9)
PO2 BLDA: 79 MMHG (ref 80–100)
POC BE: -3 MMOL/L
POC IONIZED CALCIUM: 1.4 MMOL/L (ref 1.06–1.42)
POC SATURATED O2: 95 % (ref 95–100)
POC TCO2: 24 MMOL/L (ref 23–27)
POCT GLUCOSE: 105 MG/DL (ref 70–110)
POCT GLUCOSE: 113 MG/DL (ref 70–110)
POCT GLUCOSE: 118 MG/DL (ref 70–110)
POCT GLUCOSE: 118 MG/DL (ref 70–110)
POCT GLUCOSE: 151 MG/DL (ref 70–110)
POCT GLUCOSE: 151 MG/DL (ref 70–110)
POCT GLUCOSE: 155 MG/DL (ref 70–110)
POCT GLUCOSE: 168 MG/DL (ref 70–110)
POCT GLUCOSE: 79 MG/DL (ref 70–110)
POCT GLUCOSE: 81 MG/DL (ref 70–110)
POCT GLUCOSE: 93 MG/DL (ref 70–110)
POTASSIUM BLD-SCNC: 5.5 MMOL/L (ref 3.5–5.1)
POTASSIUM SERPL-SCNC: 4.6 MMOL/L (ref 3.5–5.1)
POTASSIUM SERPL-SCNC: 4.6 MMOL/L (ref 3.5–5.1)
POTASSIUM SERPL-SCNC: 4.9 MMOL/L (ref 3.5–5.1)
POTASSIUM SERPL-SCNC: 5 MMOL/L (ref 3.5–5.1)
POTASSIUM SERPL-SCNC: 5 MMOL/L (ref 3.5–5.1)
POTASSIUM SERPL-SCNC: 5.2 MMOL/L (ref 3.5–5.1)
POTASSIUM SERPL-SCNC: 5.6 MMOL/L (ref 3.5–5.1)
PROT SERPL-MCNC: 6.4 G/DL (ref 6–8.4)
PROT SERPL-MCNC: 6.5 G/DL (ref 6–8.4)
PROTHROMBIN TIME: 10.4 SEC (ref 9–12.5)
PROTHROMBIN TIME: 10.5 SEC (ref 9–12.5)
PROTHROMBIN TIME: 10.5 SEC (ref 9–12.5)
RBC # BLD AUTO: 2.99 M/UL (ref 4.6–6.2)
RBC # BLD AUTO: 3 M/UL (ref 4.6–6.2)
RBC # BLD AUTO: 3.09 M/UL (ref 4.6–6.2)
SAMPLE: ABNORMAL
SITE: ABNORMAL
SODIUM BLD-SCNC: 144 MMOL/L (ref 136–145)
SODIUM SERPL-SCNC: 139 MMOL/L (ref 136–145)
SODIUM SERPL-SCNC: 141 MMOL/L (ref 136–145)
SODIUM SERPL-SCNC: 141 MMOL/L (ref 136–145)
SODIUM SERPL-SCNC: 142 MMOL/L (ref 136–145)
SODIUM SERPL-SCNC: 142 MMOL/L (ref 136–145)
T4 FREE SERPL-MCNC: 1.24 NG/DL (ref 0.71–1.51)
WBC # BLD AUTO: 17.68 K/UL (ref 3.9–12.7)
WBC # BLD AUTO: 20.82 K/UL (ref 3.9–12.7)
WBC # BLD AUTO: 21.01 K/UL (ref 3.9–12.7)

## 2022-08-16 PROCEDURE — 85025 COMPLETE CBC W/AUTO DIFF WBC: CPT | Performed by: THORACIC SURGERY (CARDIOTHORACIC VASCULAR SURGERY)

## 2022-08-16 PROCEDURE — 93750 INTERROGATION VAD IN PERSON: CPT | Mod: ,,, | Performed by: INTERNAL MEDICINE

## 2022-08-16 PROCEDURE — 25000003 PHARM REV CODE 250: Performed by: STUDENT IN AN ORGANIZED HEALTH CARE EDUCATION/TRAINING PROGRAM

## 2022-08-16 PROCEDURE — 84295 ASSAY OF SERUM SODIUM: CPT

## 2022-08-16 PROCEDURE — 94761 N-INVAS EAR/PLS OXIMETRY MLT: CPT

## 2022-08-16 PROCEDURE — 85610 PROTHROMBIN TIME: CPT | Mod: 91 | Performed by: THORACIC SURGERY (CARDIOTHORACIC VASCULAR SURGERY)

## 2022-08-16 PROCEDURE — 97535 SELF CARE MNGMENT TRAINING: CPT

## 2022-08-16 PROCEDURE — 85014 HEMATOCRIT: CPT

## 2022-08-16 PROCEDURE — 99231 PR SUBSEQUENT HOSPITAL CARE,LEVL I: ICD-10-PCS | Mod: ,,, | Performed by: PSYCHIATRY & NEUROLOGY

## 2022-08-16 PROCEDURE — 82800 BLOOD PH: CPT

## 2022-08-16 PROCEDURE — 25000242 PHARM REV CODE 250 ALT 637 W/ HCPCS: Performed by: THORACIC SURGERY (CARDIOTHORACIC VASCULAR SURGERY)

## 2022-08-16 PROCEDURE — 97110 THERAPEUTIC EXERCISES: CPT

## 2022-08-16 PROCEDURE — 92526 ORAL FUNCTION THERAPY: CPT

## 2022-08-16 PROCEDURE — 99900026 HC AIRWAY MAINTENANCE (STAT)

## 2022-08-16 PROCEDURE — 80048 BASIC METABOLIC PNL TOTAL CA: CPT | Mod: XB | Performed by: STUDENT IN AN ORGANIZED HEALTH CARE EDUCATION/TRAINING PROGRAM

## 2022-08-16 PROCEDURE — 82803 BLOOD GASES ANY COMBINATION: CPT

## 2022-08-16 PROCEDURE — 25000003 PHARM REV CODE 250: Performed by: PHYSICIAN ASSISTANT

## 2022-08-16 PROCEDURE — 84132 ASSAY OF SERUM POTASSIUM: CPT

## 2022-08-16 PROCEDURE — 25000003 PHARM REV CODE 250: Performed by: INTERNAL MEDICINE

## 2022-08-16 PROCEDURE — 63600175 PHARM REV CODE 636 W HCPCS: Performed by: STUDENT IN AN ORGANIZED HEALTH CARE EDUCATION/TRAINING PROGRAM

## 2022-08-16 PROCEDURE — 80053 COMPREHEN METABOLIC PANEL: CPT | Performed by: THORACIC SURGERY (CARDIOTHORACIC VASCULAR SURGERY)

## 2022-08-16 PROCEDURE — 97530 THERAPEUTIC ACTIVITIES: CPT

## 2022-08-16 PROCEDURE — 82330 ASSAY OF CALCIUM: CPT

## 2022-08-16 PROCEDURE — 85730 THROMBOPLASTIN TIME PARTIAL: CPT | Performed by: THORACIC SURGERY (CARDIOTHORACIC VASCULAR SURGERY)

## 2022-08-16 PROCEDURE — 94640 AIRWAY INHALATION TREATMENT: CPT

## 2022-08-16 PROCEDURE — 25000003 PHARM REV CODE 250

## 2022-08-16 PROCEDURE — 99900035 HC TECH TIME PER 15 MIN (STAT)

## 2022-08-16 PROCEDURE — 84100 ASSAY OF PHOSPHORUS: CPT | Mod: 91 | Performed by: THORACIC SURGERY (CARDIOTHORACIC VASCULAR SURGERY)

## 2022-08-16 PROCEDURE — 27000221 HC OXYGEN, UP TO 24 HOURS

## 2022-08-16 PROCEDURE — 63600175 PHARM REV CODE 636 W HCPCS: Performed by: INTERNAL MEDICINE

## 2022-08-16 PROCEDURE — 37799 UNLISTED PX VASCULAR SURGERY: CPT

## 2022-08-16 PROCEDURE — C1751 CATH, INF, PER/CENT/MIDLINE: HCPCS

## 2022-08-16 PROCEDURE — 84132 ASSAY OF SERUM POTASSIUM: CPT | Performed by: INTERNAL MEDICINE

## 2022-08-16 PROCEDURE — 99231 SBSQ HOSP IP/OBS SF/LOW 25: CPT | Mod: ,,, | Performed by: PSYCHIATRY & NEUROLOGY

## 2022-08-16 PROCEDURE — 94668 MNPJ CHEST WALL SBSQ: CPT

## 2022-08-16 PROCEDURE — 36410 VNPNXR 3YR/> PHY/QHP DX/THER: CPT

## 2022-08-16 PROCEDURE — 83735 ASSAY OF MAGNESIUM: CPT | Mod: 91 | Performed by: THORACIC SURGERY (CARDIOTHORACIC VASCULAR SURGERY)

## 2022-08-16 PROCEDURE — 20000000 HC ICU ROOM

## 2022-08-16 PROCEDURE — 99233 PR SUBSEQUENT HOSPITAL CARE,LEVL III: ICD-10-PCS | Mod: ,,, | Performed by: INTERNAL MEDICINE

## 2022-08-16 PROCEDURE — 99233 SBSQ HOSP IP/OBS HIGH 50: CPT | Mod: ,,, | Performed by: INTERNAL MEDICINE

## 2022-08-16 PROCEDURE — 80076 HEPATIC FUNCTION PANEL: CPT | Performed by: STUDENT IN AN ORGANIZED HEALTH CARE EDUCATION/TRAINING PROGRAM

## 2022-08-16 PROCEDURE — 84439 ASSAY OF FREE THYROXINE: CPT | Performed by: STUDENT IN AN ORGANIZED HEALTH CARE EDUCATION/TRAINING PROGRAM

## 2022-08-16 PROCEDURE — 27000248 HC VAD-ADDITIONAL DAY

## 2022-08-16 PROCEDURE — 99232 PR SUBSEQUENT HOSPITAL CARE,LEVL II: ICD-10-PCS | Mod: ,,, | Performed by: GENERAL ACUTE CARE HOSPITAL

## 2022-08-16 PROCEDURE — 84132 ASSAY OF SERUM POTASSIUM: CPT | Mod: 91 | Performed by: STUDENT IN AN ORGANIZED HEALTH CARE EDUCATION/TRAINING PROGRAM

## 2022-08-16 PROCEDURE — 25000242 PHARM REV CODE 250 ALT 637 W/ HCPCS

## 2022-08-16 PROCEDURE — 76937 US GUIDE VASCULAR ACCESS: CPT

## 2022-08-16 PROCEDURE — 99232 SBSQ HOSP IP/OBS MODERATE 35: CPT | Mod: ,,, | Performed by: GENERAL ACUTE CARE HOSPITAL

## 2022-08-16 PROCEDURE — 85384 FIBRINOGEN ACTIVITY: CPT | Performed by: THORACIC SURGERY (CARDIOTHORACIC VASCULAR SURGERY)

## 2022-08-16 PROCEDURE — 93750 PR INTERROGATE VENT ASSIST DEV, IN PERSON, W PHYSICIAN ANALYSIS: ICD-10-PCS | Mod: ,,, | Performed by: INTERNAL MEDICINE

## 2022-08-16 PROCEDURE — 80048 BASIC METABOLIC PNL TOTAL CA: CPT | Mod: 91,XB | Performed by: STUDENT IN AN ORGANIZED HEALTH CARE EDUCATION/TRAINING PROGRAM

## 2022-08-16 RX ORDER — CALCIUM GLUCONATE 20 MG/ML
1 INJECTION, SOLUTION INTRAVENOUS ONCE
Status: DISCONTINUED | OUTPATIENT
Start: 2022-08-16 | End: 2022-08-16

## 2022-08-16 RX ORDER — CALCIUM GLUCONATE 20 MG/ML
1 INJECTION, SOLUTION INTRAVENOUS EVERY 10 MIN PRN
Status: DISCONTINUED | OUTPATIENT
Start: 2022-08-16 | End: 2022-08-16

## 2022-08-16 RX ORDER — ALBUTEROL SULFATE 2.5 MG/.5ML
10 SOLUTION RESPIRATORY (INHALATION) ONCE
Status: DISCONTINUED | OUTPATIENT
Start: 2022-08-16 | End: 2022-08-16

## 2022-08-16 RX ORDER — CALCIUM GLUCONATE 20 MG/ML
1 INJECTION, SOLUTION INTRAVENOUS ONCE
Status: DISCONTINUED | OUTPATIENT
Start: 2022-08-16 | End: 2022-08-19

## 2022-08-16 RX ORDER — CALCIUM GLUCONATE 20 MG/ML
1 INJECTION, SOLUTION INTRAVENOUS EVERY 10 MIN PRN
Status: DISCONTINUED | OUTPATIENT
Start: 2022-08-16 | End: 2022-08-19

## 2022-08-16 RX ORDER — PREDNISONE 20 MG/1
20 TABLET ORAL DAILY
Status: DISCONTINUED | OUTPATIENT
Start: 2022-08-17 | End: 2022-08-18

## 2022-08-16 RX ORDER — CALCIUM GLUCONATE 20 MG/ML
1 INJECTION, SOLUTION INTRAVENOUS ONCE
Status: COMPLETED | OUTPATIENT
Start: 2022-08-16 | End: 2022-08-16

## 2022-08-16 RX ORDER — METHIMAZOLE 5 MG/1
5 TABLET ORAL DAILY
Status: DISCONTINUED | OUTPATIENT
Start: 2022-08-17 | End: 2022-08-22

## 2022-08-16 RX ORDER — LACTULOSE 10 G/15ML
30 SOLUTION ORAL ONCE
Status: COMPLETED | OUTPATIENT
Start: 2022-08-16 | End: 2022-08-16

## 2022-08-16 RX ADMIN — ASPIRIN 81 MG CHEWABLE TABLET 81 MG: 81 TABLET CHEWABLE at 08:08

## 2022-08-16 RX ADMIN — PANTOPRAZOLE SODIUM 40 MG: 40 GRANULE, DELAYED RELEASE ORAL at 08:08

## 2022-08-16 RX ADMIN — LEVALBUTEROL HYDROCHLORIDE 0.63 MG: 0.63 SOLUTION RESPIRATORY (INHALATION) at 01:08

## 2022-08-16 RX ADMIN — LEVALBUTEROL HYDROCHLORIDE 0.63 MG: 0.63 SOLUTION RESPIRATORY (INHALATION) at 07:08

## 2022-08-16 RX ADMIN — METHIMAZOLE 5 MG: 5 TABLET ORAL at 08:08

## 2022-08-16 RX ADMIN — Medication 400 MG: at 08:08

## 2022-08-16 RX ADMIN — HEPARIN SODIUM 23 UNITS/KG/HR: 5000 INJECTION INTRAVENOUS; SUBCUTANEOUS at 04:08

## 2022-08-16 RX ADMIN — INSULIN HUMAN 10 UNITS: 100 INJECTION, SOLUTION PARENTERAL at 06:08

## 2022-08-16 RX ADMIN — MEROPENEM 2 G: 1 INJECTION INTRAVENOUS at 05:08

## 2022-08-16 RX ADMIN — INSULIN ASPART 3 UNITS: 100 INJECTION, SOLUTION INTRAVENOUS; SUBCUTANEOUS at 11:08

## 2022-08-16 RX ADMIN — LEVALBUTEROL HYDROCHLORIDE 0.63 MG: 0.63 SOLUTION RESPIRATORY (INHALATION) at 12:08

## 2022-08-16 RX ADMIN — MIRTAZAPINE 15 MG: 15 TABLET, FILM COATED ORAL at 09:08

## 2022-08-16 RX ADMIN — AMIODARONE HYDROCHLORIDE 400 MG: 200 TABLET ORAL at 08:08

## 2022-08-16 RX ADMIN — ACETYLCYSTEINE 4 ML: 100 SOLUTION ORAL; RESPIRATORY (INHALATION) at 01:08

## 2022-08-16 RX ADMIN — POLYETHYLENE GLYCOL 3350 17 G: 17 POWDER, FOR SOLUTION ORAL at 08:08

## 2022-08-16 RX ADMIN — ACETYLCYSTEINE 4 ML: 100 SOLUTION ORAL; RESPIRATORY (INHALATION) at 12:08

## 2022-08-16 RX ADMIN — HEPARIN SODIUM 23 UNITS/KG/HR: 5000 INJECTION INTRAVENOUS; SUBCUTANEOUS at 09:08

## 2022-08-16 RX ADMIN — CALCIUM GLUCONATE 1 G: 98 INJECTION, SOLUTION INTRAVENOUS at 06:08

## 2022-08-16 RX ADMIN — HYDROXYZINE HYDROCHLORIDE 25 MG: 25 TABLET ORAL at 09:08

## 2022-08-16 RX ADMIN — MEXILETINE HYDROCHLORIDE 400 MG: 200 CAPSULE ORAL at 05:08

## 2022-08-16 RX ADMIN — DOCUSATE SODIUM 100 MG: 50 LIQUID ORAL at 08:08

## 2022-08-16 RX ADMIN — PREDNISONE 40 MG: 20 TABLET ORAL at 08:08

## 2022-08-16 RX ADMIN — BUSPIRONE HYDROCHLORIDE 5 MG: 5 TABLET ORAL at 02:08

## 2022-08-16 RX ADMIN — LACTULOSE 30 G: 20 SOLUTION ORAL at 01:08

## 2022-08-16 RX ADMIN — ACETYLCYSTEINE 4 ML: 100 SOLUTION ORAL; RESPIRATORY (INHALATION) at 07:08

## 2022-08-16 RX ADMIN — Medication 6 MG: at 09:08

## 2022-08-16 RX ADMIN — LEVALBUTEROL HYDROCHLORIDE 0.63 MG: 0.63 SOLUTION RESPIRATORY (INHALATION) at 08:08

## 2022-08-16 RX ADMIN — BUSPIRONE HYDROCHLORIDE 5 MG: 5 TABLET ORAL at 09:08

## 2022-08-16 RX ADMIN — BUSPIRONE HYDROCHLORIDE 5 MG: 5 TABLET ORAL at 08:08

## 2022-08-16 RX ADMIN — MEXILETINE HYDROCHLORIDE 400 MG: 200 CAPSULE ORAL at 09:08

## 2022-08-16 RX ADMIN — MEXILETINE HYDROCHLORIDE 400 MG: 200 CAPSULE ORAL at 01:08

## 2022-08-16 RX ADMIN — DEXTROSE 250 ML: 10 SOLUTION INTRAVENOUS at 06:08

## 2022-08-16 RX ADMIN — ACETYLCYSTEINE 4 ML: 100 SOLUTION ORAL; RESPIRATORY (INHALATION) at 08:08

## 2022-08-16 NOTE — PT/OT/SLP PROGRESS
"Speech Language Pathology Treatment    Patient Name:  Tim Richards   MRN:  [id number]  Admitting Diagnosis: Left ventricular assist device (LVAD) complication    Recommendations:                 General Recommendations:  Speech/language therapy  Diet recommendations:  NPO, Liquid Diet Level: Nectar Thick (for pleasure)   Ok for ice chips for the purpose for completing swallowing exercises   2-3 x daily complete the following   1. Effortful swallow x10  2. Falsetto /i/ x10  3. Ro Maneuver x3    Aspiration Precautions: 1 bite/sip at a time   General Precautions: Standard, fall, LVAD, sternal  Communication strategies:  wear speaking valve for all waking hours     Subjective   Pt awake and alert upright in cardiac chair upon arrival   RN aware of and in agreement with plan     Pain/Comfort:  · Pain Rating 1: 0/10  · Pain Rating Post-Intervention 1: 0/10    Respiratory Status: trach collar    Objective:     Has the patient been evaluated by SLP for swallowing?   Yes  Keep patient NPO? No     SLP reviewed with pt MBS results and recommendations. SLP discussed with pt at length goal to repeat MBS in approx 1.5 weeks after a period of time completing swallowing exercises. SLP discussed with pt in the interim he may continue to have nectar thick liquids for pleasure. SLP demonstrated how to appropriately thicken liquids and pt consumed approx 2 oz via open cup without difficulty or overt clinical signs of aspiration. SLP provided written instruction of swallowing exercises and provided demonstrations. Pt completed ro maneuver x2 and effortful swallow x5 with fair ability. Pt warranting SLP max cues to complete falsetto "ee." Written instructions left at the bedside and pt demonstrating understanding of importance of completing exercises routinely outside of structured speech therapy sessions.     Assessment:     Tim Richards is a 56 y.o. male with an SLP diagnosis of Dysphagia, S/P Trach and One Way " Speaking Valve Training.     Goals:   Multidisciplinary Problems     SLP Goals        Problem: SLP    Goal Priority Disciplines Outcome   SLP Goal     SLP    Description: Speech Language Pathology Goals  Goals expected to be met by 8/18    1.Pt will participate in more objective swallow assessment via MBSS to help determine least restrictive diet   2. . Pt will tolerate PMSV for waking hours to increase phonation, respiration and swallowing aspects  3. Pt/family will don/doff PMSV x1 during session with min cues                       Plan:     · Patient to be seen:  4 x/week   · Plan of Care expires:     · Plan of Care reviewed with:  patient   · SLP Follow-Up:  Yes       Discharge recommendations:  rehabilitation facility   Barriers to Discharge:  None    Time Tracking:     SLP Treatment Date:   08/16/22  Speech Start Time:  1344  Speech Stop Time:  1405     Speech Total Time (min):  21 min    Billable Minutes: Treatment Swallowing Dysfunction 10 and Self Care/Home Management Training 11    08/16/2022

## 2022-08-16 NOTE — SUBJECTIVE & OBJECTIVE
"    Family History       Problem Relation (Age of Onset)    Cancer Sister (54)    Coronary artery disease Father    Diabetes Father    Heart disease Father    Hypertension Father    No Known Problems Mother, Brother          Tobacco Use    Smoking status: Former Smoker     Packs/day: 1.00     Years: 31.00     Pack years: 31.00     Types: Cigarettes     Quit date: 2018     Years since quittin.5    Smokeless tobacco: Never Used    Tobacco comment: pt is quiting on his own - pt stated not qualified for program;  pt  quit on his own   Substance and Sexual Activity    Alcohol use: No     Alcohol/week: 0.0 standard drinks     Comment: quit    Drug use: No    Sexual activity: Yes     Partners: Female     Birth control/protection: None     Comment: 10/5/17  with same partner 7 years      Psychotherapeutics (From admission, onward)                Start     Stop Route Frequency Ordered    22 1500  busPIRone tablet 5 mg         -- PER NG TUBE 3 times daily 22 1224    22 1323  ALPRAZolam tablet 1 mg         -- PER NG TUBE Daily PRN 22 1224    22 2100  mirtazapine tablet 15 mg         -- Oral Nightly 22 1121             Review of Systems  Objective:     Vital Signs (Most Recent):  Temp: 97.7 °F (36.5 °C) (22 1500)  Pulse: 75 (22 1700)  Resp: (!) 24 (22 1700)  BP: (!) 70/0 (22 1500)  SpO2: 97 % (22 1322)   Vital Signs (24h Range):  Temp:  [97.5 °F (36.4 °C)-98 °F (36.7 °C)] 97.7 °F (36.5 °C)  Pulse:  [69-79] 75  Resp:  [17-37] 24  SpO2:  [95 %-100 %] 97 %  BP: (66-78)/(0) 70/0  Arterial Line BP: ()/(54-90) 95/84     Height: 6' 1" (185.4 cm)  Weight: 81.2 kg (179 lb)  Body mass index is 23.62 kg/m².      Intake/Output Summary (Last 24 hours) at 2022 1720  Last data filed at 2022 1700  Gross per 24 hour   Intake 2740.6 ml   Output 1725 ml   Net 1015.6 ml       Mental Status Exam:  Appearance: groomed, in hospital gown, sitting on " "edge of bed  Behavior/Cooperation: cooperative, fair eye contact  Speech: normal rate, rhythm, prasody  Mood: "bored"  Affect: reactive, appropriate  Thought Process: linear and goal oriented  Thought Content: denies SI, HI, AVH  Orientation: A&Ox4  Memory: recent and remote intact  Attention Span/Concentration: intact  Insight: fair  Judgment: fair    Significant Labs: All pertinent labs within the past 24 hours have been reviewed.    Significant Imaging: I have reviewed all pertinent imaging results/findings within the past 24 hours.  "

## 2022-08-16 NOTE — SUBJECTIVE & OBJECTIVE
"Interval HPI:   Overnight events: fT4 continues to downtrend with decreasing methimazole and pred. Will d/c methimazole after this morning's dose and continue to wean pred. BG 70s-80s this AM after receiving 10u regular insulin as part of hyperkalemia order set.  Eating:   NPO  Nausea: No  Hypoglycemia and intervention: No  Fever: No  TPN and/or TF: Yes  If yes, type of TF/TPN and rate: Novasource 45 mL/hr    BP (!) 70/0 (BP Location: Right arm, Patient Position: Lying)   Pulse 73   Temp 97.5 °F (36.4 °C) (Oral)   Resp (!) 24   Ht 6' 1" (1.854 m)   Wt 81.2 kg (179 lb)   SpO2 97%   BMI 23.62 kg/m²     Labs Reviewed and Include    Recent Labs   Lab 08/16/22 0342 08/16/22  0532 08/16/22  1154      < > 143*   CALCIUM 9.4   < > 9.0   ALBUMIN 2.2*  2.2*  --   --    PROT 6.4  6.5  --   --       < > 141   K 4.9   < > 5.6*  5.6*   CO2 15*   < > 16*   *   < > 116*   BUN 43*   < > 44*   CREATININE 1.1   < > 1.3   ALKPHOS 226*  224*  --   --    ALT 61*  59*  --   --    AST 53*  53*  --   --    BILITOT 2.4*  2.4*  --   --     < > = values in this interval not displayed.     Lab Results   Component Value Date    WBC 20.82 (H) 08/16/2022    HGB 8.5 (L) 08/16/2022    HCT 29.9 (L) 08/16/2022     (H) 08/16/2022     08/16/2022     Recent Labs   Lab 08/14/22  1936 08/15/22  0346 08/16/22 0342   TSH 0.127*  --   --    FREET4 1.39 1.46 1.24     Lab Results   Component Value Date    HGBA1C 5.4 04/26/2021       Nutritional status:   Body mass index is 23.62 kg/m².  Lab Results   Component Value Date    ALBUMIN 2.2 (L) 08/16/2022    ALBUMIN 2.2 (L) 08/16/2022    ALBUMIN 2.2 (L) 08/15/2022     Lab Results   Component Value Date    PREALBUMIN 32 08/11/2022    PREALBUMIN 28 08/05/2022    PREALBUMIN 13 (L) 07/29/2022       Estimated Creatinine Clearance: 72.6 mL/min (based on SCr of 1.3 mg/dL).    Accu-Checks  Recent Labs     08/15/22  0803 08/15/22  0804 08/15/22  1245 08/15/22  2102 " 08/16/22  0007 08/16/22  0341 08/16/22  0623 08/16/22  0728 08/16/22  0815 08/16/22  1008   POCTGLUCOSE 35* 105 136* 118* 93 105 118* 81 79 113*       Current Medications and/or Treatments Impacting Glycemic Control  Immunotherapy:    Immunosuppressants       None          Steroids:   Hormones (From admission, onward)                Start     Stop Route Frequency Ordered    08/17/22 0900  predniSONE tablet 20 mg         -- PER NG TUBE Daily 08/16/22 1000    08/02/22 1931  melatonin tablet 6 mg         -- OG Nightly PRN 08/02/22 1832          Pressors:    Autonomic Drugs (From admission, onward)                Start     Stop Route Frequency Ordered    07/29/22 0011  EPINEPHrine (ADRENALIN) 1 mg/mL injection        Note to Pharmacy: Created by cabinet override    07/29 1214   07/29/22 0011          Hyperglycemia/Diabetes Medications:   Antihyperglycemics (From admission, onward)                Start     Stop Route Frequency Ordered    08/15/22 1200  insulin aspart U-100 pen 3 Units         -- SubQ 6 times per day 08/15/22 1011    07/30/22 1023  insulin aspart U-100 pen 0-5 Units         -- SubQ Every 4 hours PRN 07/30/22 0925

## 2022-08-16 NOTE — PT/OT/SLP PROGRESS
Physical Therapy Co-Treatment  Co-treatment performed due to acuity and complexity of pt's medical status with 2 skilled disciplines needed to optimize pts functional performance in ICU setting.    Patient Name:  Tim Richards   MRN:  [id number]    Recommendations:     Discharge Recommendations:  rehabilitation facility   Discharge Equipment Recommendations:  (TBD by next level of care)   Barriers to discharge: Inaccessible home and Decreased caregiver support    Assessment:     Tim Richards is a 56 y.o. male admitted with a medical diagnosis of Left ventricular assist device (LVAD) complication.  He presents with the following impairments/functional limitations:  weakness, impaired endurance, impaired self care skills, impaired functional mobility, gait instability, impaired balance, decreased upper extremity function, decreased lower extremity function, decreased safety awareness. Pt tolerated session well. Pt appeared sad today and reporting fatigue. Pt demonstrated 2 stands with modA x2 with B HHA with bed elevated to knee flexion slightly greater than 90 degrees. PT attempted to get pt to sit in cardiac chair but patient declined despite previously being highly motivated to get OOB. Pt progressing with therapy, but still very quickly fatigues with stands EOB.    Rehab Prognosis: Good; patient would benefit from acute skilled PT services to address these deficits and reach maximum level of function.    Recent Surgery: Procedure(s) (LRB):  CREATION, TRACHEOSTOMY (N/A)  INSERTION, CENTRAL VENOUS ACCESS DEVICE 11 Days Post-Op    Plan:     During this hospitalization, patient to be seen 5 x/week to address the identified rehab impairments via gait training, therapeutic activities, therapeutic exercises, neuromuscular re-education and progress toward the following goals:    · Plan of Care Expires:  08/20/22    Subjective     Chief Complaint: none verbalized  Patient/Family Comments/goals: pt declined getting  into cardiac chair and stated he was tired  Pain/Comfort:  Pain Rating 1: 0/10  Pain Rating Post-Intervention 1: 0/10      Objective:     Communicated with RN prior to session.  Patient found HOB elevated with arterial line, Tracheostomy, telemetry, peripheral IV, oxygen upon PT entry to room.     General Precautions: Standard, fall   Orthopedic Precautions:N/A   Braces: N/A  Respiratory Status: tracheostomy     Functional Mobility:  · Bed Mobility:     · Scooting: contact guard assistance  · Supine to Sit: min assistance  · Sit to Supine: mfderate assistance  · Transfers:     ? Trial 1: Sit to Stand from bed at elevated height with knees slightly greater then 90 degrees:  moderate assistance and of 2 persons with no AD and hand-held assist; B knees and feet blocked for safety, pt able to achieve full upright for ~10 seconds and manual hip and knee extension facilitation  ? Trial 2: Sit to Stand from bed at elevated height with knees slightly greater then 90 degrees:  moderate assistance and of 2 persons with no AD and hand-held assist; B knees and feet blocked for safety, pt able to achieve full upright for ~10 seconds and manual hip and knee extension facilitation  · Gait: N/T this date  · Balance:   ? Static Sitting: SBA  ? Dynamic Sitting: SBA-CGA  ? Static Standing: modAx2 to maxAx2  ? Dynamic Standing: N/T     LVAD:   · Pt remained on wall power throughout session.  · No alarms sounded during session      AM-PAC 6 CLICK MOBILITY  Turning over in bed (including adjusting bedclothes, sheets and blankets)?: 3  Sitting down on and standing up from a chair with arms (e.g., wheelchair, bedside commode, etc.): 2  Moving from lying on back to sitting on the side of the bed?: 3  Moving to and from a bed to a chair (including a wheelchair)?: 1  Need to walk in hospital room?: 1  Climbing 3-5 steps with a railing?: 1  Basic Mobility Total Score: 11       Therapeutic Activities and Exercises:  Patient also educated and  instructed on therapeutic exercises. Therapeutic exercises included the following: Long Arc Quads, Seated marches (Single leg), Bilateral Heel raises, Bilateral Toe Raises. Pt performed 1x10 on BLEs with verbal/tactile cuing or assistance as needed.  PT set up with LE bike and pt quickly got frustrated with difficulty trying to find right height and distance from patient and declined to further try bike EOB.  Patient educated on role of therapy, goals of session, and benefits of mobilizing.   Discussed PT plan of care during hospitalization.   Patient educated on calling for assistance.   Patient educated on how their diagnosis impacts their mobility within PT scope of practice.   Communication board up to date.  All questions answered within PT scope of practice.    Patient left HOB elevated with all lines intact, call button in reach and RN notified..    GOALS:   Multidisciplinary Problems     Physical Therapy Goals        Problem: Physical Therapy    Goal Priority Disciplines Outcome Goal Variances Interventions   Physical Therapy Goal     PT, PT/OT Ongoing, Progressing     Description: Goals to be met by: 22    Patient will increase functional independence with mobility by performin. Supine to sit with MInimal Assistance -not met  2. Sit to stand transfer with Contact Guard Assistance -not met  3. Gait  x 150 feet with Contact Guard Assistance with approved assistive device -not met  4. Ascend/descend 8 stair with right Handrails Contact Guard Assistance -not met  5. Sitting at edge of bed x15 minutes with Contact Guard Assistance to improve tolerance to activities. -not met  6. Lower extremity exercise program x15 reps with assistance as needed -not met               Multidisciplinary Problems (Resolved)        Problem: Physical Therapy    Goal Priority Disciplines Outcome Goal Variances Interventions   Physical Therapy Goal   (Resolved)     PT, PT/OT Met     Description: The patient is near his  functional baseline, he ambulated within the room with no AD and independence. Unable to do stair training, assess gait endurance due to COVID restrictions. He is safe to return home with no therapy needs. He is in agreement with PT discharge, he states he is confident he can ascend/descend his stairs.           Problem: Physical Therapy    Goal Priority Disciplines Outcome Goal Variances Interventions   Physical Therapy Goal   (Resolved)     PT, PT/OT Met                     Time Tracking:     PT Received On: 08/15/22  PT Start Time: 0919     PT Stop Time: 0950  PT Total Time (min): 31 min     Billable Minutes: Therapeutic Exercise 15 and Neuromuscular Re-education 8       PT/PTA: PT     PTA Visit Number: 0     08/15/2022

## 2022-08-16 NOTE — PLAN OF CARE
SICU PLAN OF CARE NOTE     Dx: Left ventricular assist device (LVAD) complication     Shift Events: Speech to return tomorrow for swallow re-eval. Kayexalate overnight for K 5.5. Shifted K 5.6 this AM with insulin and dextrose. No VAD alarms.      Goals of Care: MAP 65-85, K > 4.5, Mg > 2.5     Neuro: AAO x4, Follows Commands, and Moves All Extremities     Respiratory: 5L/28% TC     Diet: Strict NPO and Tube Feeds @ 60 ml/hr (minimal residuals)      Gtts: Heparin     Urine Output: Voids Spontaneously 825 cc/shift     VAD speed 6300 rpm           Power: 5.3-5.4           Flow: 5.5-5.6           PI: 2.1-3.2     Labs/Accuchecks: Chem/CBC Q8hr. Accuchecks Q4hr     Skin: No new skin breakdown noted. Turned Q2h and shifting weight independently. Sacral foam in place. R groin packed and covered.

## 2022-08-16 NOTE — PLAN OF CARE
"      SICU PLAN OF CARE NOTE    Dx: Left ventricular assist device (LVAD) complication    Shift Events: Sat and stood at bedside with PT/OT x 2 assist but returned to bed after refusal to sit in chair at this time, felt weak and fatigued. Speech to return in pm or tomorrow for swallow re-eval. Remains strict NPO. TF per NGT @ goal and tolerating. No BM but passing gas and positive bowel sounds, prep as ordered. K+ elevated with MD aware. Adequate UOP per urinal. No VAD issues or alarms all shift. Heparin gtt therapeutic.     Goals of Care: MAP 65-85, K > 4.5, Mg > 2.5    Neuro: AAO x4, Follows Commands, and Moves All Extremities    Vital Signs: BP (!) 78/0 (BP Location: Right arm, Patient Position: Lying)   Pulse 72   Temp 97.8 °F (36.6 °C) (Oral)   Resp (!) 24   Ht 6' 1" (1.854 m)   Wt 88.5 kg (195 lb)   SpO2 97%   BMI 25.73 kg/m²     Respiratory: 8L/35% TC, occasional tracheal suctioning required    Diet: NPO and Tube Feeds @ 60 ml/hr    Gtts: Heparin    Urine Output: Voids Spontaneously 1000 cc/shift    Drains: none    VAD speed 6300 rpm           Power: 5.3-5.4           Flow: 5.3-5.5           PI: 1.7-3.5     Labs/Accuchecks: Chem/CBC Q8hr. Accuchecks Q4hr    Skin: No new skin breakdown noted. TQ2 with minimal assist. Immerse specialty bed in use and bed functioning properly. Heels elevated on pillows. Sacral foam in place. Left abd Driveline CDI. Open incisions to right abd and right groin packed and intact with minimal drainage noted, see assessment for details and WC orders.       "

## 2022-08-16 NOTE — ASSESSMENT & PLAN NOTE
Key History and Diagnostic Findings  - Hyperglycemia noted once starting TPN in addition to steroids  - No prior history of diabetes; most recent A1c of 5.4 on 04/26/2021  - Blood sugars tight in last 24 hours --even after stopping scheduled levemir and reducing scheduled aspart   - With decreasing steroids anticipate decreased insulin requirement.     Plan  - Discontinue scheduled aspart with TF -- will continue LDC only  - Please notify endocrine if any change in tube feeds as this will change insulin requirements.  - Please hold insulin if tube feeds are held for some reason  - Please ensure that accuchecks are being documented every 4 hours

## 2022-08-16 NOTE — PROGRESS NOTES
Interdisciplinary Rounds Report:   Attended interdisciplinary rounds with the [facility]/CTS services including the LVAD Coordinators, social workers, cardiologists, surgeons,  PT/OT/Speech, dietician, and unit charge nurses. Discussed patient status, plan of care, goals of care, including DC date, and post discharge needs. Plan of care will be discussed with the patient and/or family per the physician while rounding on the floor. This is a recurring meeting that is medically and socially necessary to collaborate with the interdisciplinary team to assist patient needs and safe discharge.

## 2022-08-16 NOTE — PROGRESS NOTES
08/16/2022  Carissa Dean    Current provider:  Amy Rhodes MD    Device interrogation:  TXP LVAD INTERROGATIONS 8/16/2022 8/16/2022 8/16/2022 8/16/2022 8/16/2022 8/16/2022 8/16/2022   Type HeartMate3 HeartMate3 HeartMate3 HeartMate3 HeartMate3 HeartMate3 HeartMate3   Flow 6.4 5.3 5.3 5.5 5.4 5.5 5.5   Speed 6300 6300 6300 6300 6300 6300 6300   PI 3.1 3.5 3.1 2.8 3.5 3 3   Power ([place]) 5.4 5.2 5.3 5.3 5.5 5.3 5.4   LSL 5900 5900 5900 5900 5900 5900 5900   Low Flow Alarm - - - - - - -   Pulsatility Intermittent pulse Intermittent pulse Intermittent pulse Intermittent pulse Intermittent pulse Intermittent pulse Intermittent pulse          Rounded on Tim Richards to ensure all mechanical assist device settings (IABP or VAD) were appropriate and all parameters were within limits.  I was able to ensure all back up equipment was present, the staff had no issues, and the Perfusion Department daily rounding was complete.      For implantable VADs: Interrogation of Ventricular assist device was performed with analysis of device parameters and review of device function. I have personally reviewed the interrogation findings and agree with findings as stated.     In emergency, the nursing units have been notified to contact the perfusion department either by:  Calling [phone] from 630am to 4pm Mon thru Fri, utilizing the On-Call Finder functionality of Epic and searching for Perfusion, or by contacting the hospital  from 4pm to 630am and on weekends and asking to speak with the perfusionist on call.    9:49 AM

## 2022-08-16 NOTE — PT/OT/SLP PROGRESS
Occupational Therapy   Co-Treatment w PT  Co-treat performed due to acuity and complexity of pt's medical status with 2 skilled disciplines needed to optimize pts functional performance in ICU setting.      Patient Name:  Tim Richards   MRN:  [id number]  Admit Date: 6/27/2022  Admitting Diagnosis:  Left ventricular assist device (LVAD) complication   Length of Stay: 50 days  Recent Surgery: Procedure(s) (LRB):  CREATION, TRACHEOSTOMY (N/A)  INSERTION, CENTRAL VENOUS ACCESS DEVICE 12 Days Post-Op    Recommendations:     Discharge Recommendations: rehabilitation facility  Discharge Equipment Recommendations:  other (see comments) (TBD pending progress)  Barriers to discharge:  Inaccessible home environment, Decreased caregiver support    Plan:     Patient to be seen 5 x/week to address the above listed problems via self-care/home management, therapeutic activities, therapeutic exercises, neuromuscular re-education  · Plan of Care Expires: 08/25/22  · Plan of Care Reviewed with: patient    Assessment:   Tim Richards is a 56 y.o. male with a medical diagnosis of Left ventricular assist device (LVAD) complication.  He presents with the following performance deficits affecting function: weakness, impaired endurance, impaired self care skills, impaired functional mobility, gait instability, impaired balance, decreased lower extremity function, impaired cardiopulmonary response to activity.  Pt continues to benefit from a collaborative PT/OT/SLP program to improve quality of life and focus on recovery of impairments.     Pt is an excellent candidate for inpatient rehabilitation, as he has a qualifying diagnosis, displays good activity tolerance, has experienced decline from PLOF, has good family support, and is motivated to participate in therapy.       Rehab Prognosis: Good; patient would benefit from acute skilled OT services to address these deficits and reach maximum level of function.        Subjective  "  Communicated with: RN prior to session.  Patient found HOB elevated with arterial line, blood pressure cuff, LVAD, oxygen, peripheral IV, pulse ox (continuous), telemetry, Tracheostomy upon OT entry to room.    Patient: "its good to be up" pt stated in chair    Pain/Comfort:  · Pain Rating 1: 0/10  · Pain Rating Post-Intervention 1: 0/10    Objective:   Patient found with: arterial line, blood pressure cuff, LVAD, oxygen, peripheral IV, pulse ox (continuous), telemetry, Tracheostomy     General Precautions: Standard, Cardiac fall, LVAD, sternal   Orthopedic Precautions:N/A   Braces: N/A   Respiratory Status:    trach to 5L  Vitals: BP (!) 70/0 (BP Location: Right arm, Patient Position: Lying)   Pulse 73   Temp 97.5 °F (36.4 °C) (Oral)   Resp (!) 24   Ht 6' 1" (1.854 m)   Wt 81.2 kg (179 lb)   SpO2 97%   BMI 23.62 kg/m²     Outcome Measures:  AMPAC 6 Click ADL: 17    Cognition:   · Oriented X 4 and Alert  · Command following: follows one-step commands  · Communication: clear/fluent    Occupational Performance:  Bed Mobility:    · Patient completed Supine to Sit with minimum assistance on R side of bed  · Scooting anteriorly to EOB to have both feet planted on floor: contact guard assistance    Functional Mobility/Transfers:   Static Sitting EOB: SBA   Dynamic Sitting EOB: CGA   Patient completed Sit <> Stand Transfer with maximal assistance and of 2 persons  with  hand-held assist    Static Standing Balance: Max A x2 person HH assist unable to achieve upright stand, transitioned into squat pivot to medichair   Patient completed Bed <> MediChair Transfer using Squat Pivot technique with maximal assistance and of 2 persons with hand-held assist      Activities of Daily Living:  · Grooming: contact guard assistance set up seated in medichair washing face and brushing teeth. Pt maintained NPO.     AMPAC 6 Click ADL:  AMPAC Total Score: 17    Treatment & Education:  -Sternal precautions and application to " functional tasks / ADLs reviewed:  including no lifting > 5 lbs, pulling or pushing with BUEs; Modifying daily activities and functional mobility tasks to maintain sternal precautions appropriately; Importance of participation in therapy and engaging in increased OOB mobility with assistance to improve endurance  -OT POC, safety during ADLs and mobility   -Education on energy conservation and task modification to maximize safety and independence  -Questions answered within OT scope of practice.      Patient left up in chair with all lines intact, call button in reach, RN notified and Rn present    GOALS:   Multidisciplinary Problems     Occupational Therapy Goals        Problem: Occupational Therapy    Goal Priority Disciplines Outcome Interventions   Occupational Therapy Goal     OT, PT/OT Ongoing, Progressing    Description: Goals to be met by: 8/9/22     Patient will increase functional independence with ADLs by performing:    UE Dressing with Stand-by Assistance.  LE Dressing with Stand-by Assistance.  Grooming while standing at sink with Stand-by Assistance.  Toileting from toilet with Stand-by Assistance for hygiene and clothing management.   Toilet transfer to toilet with Stand-by Assistance.               Multidisciplinary Problems (Resolved)        Problem: Occupational Therapy    Goal Priority Disciplines Outcome Interventions   Occupational Therapy Goal   (Resolved)     OT, PT/OT Met           Problem: Occupational Therapy    Goal Priority Disciplines Outcome Interventions   Occupational Therapy Goal   (Resolved)     OT, PT/OT Met                    Time Tracking:     OT Date of Treatment: 08/16/22  OT Start Time: 1126  OT Stop Time: 1157  OT Total Time (min): 31 min  Additional staff present: PT      Billable Minutes:Self Care/Home Management 15  Therapeutic Activity 16      8/16/2022

## 2022-08-16 NOTE — PROGRESS NOTES
[facility]  Psychiatry  Progress Note    Patient Name: Tim Richards  MRN: [id number]   Code Status: Full Code  Admission Date: 6/27/2022  Hospital Length of Stay: 50 days  Expected Discharge Date: 8/31/2022  Attending Physician: Amy Rhodes MD  Primary Care Provider: Deyanira Booth MD    Current Legal Status: Uncontested    Patient information was obtained from patient and EMR.       Subjective:     Patient is a 56 y.o., male, presents with:    Principal Problem:Left ventricular assist device (LVAD) complication    Chief Complaint: none    HPI:   Tim Richards is a 56 y.o. male with a past psychiatric history of generalized anxiety disorder, adjustment disorder, and insomnia who presented to [facility] [place] due to Left ventricular assist device (LVAD) complication. Psychiatry was consulted for depression.    Per Primary Team:  56-yo M with hx of Stage D HFrEF (NICMP, EF 10%, previously NYHA II), s/p  HM2 implanted in march/2018, SC ICD, VT on amiodarone and mexiletine and amiodarone induced hypothyroidism. Pt refers that he was in his usual state until 3-4 days ago when he visited a family member's house and had dietary indiscretions. Refers that since then he has had low appetite. Also states today he started to notice a dark urine and increasing SOB so he decided to come to the ED. He denies palpitaitons, ICD shocks, but refers some chest tightness.   He also refers chills and soft stools for the past 2 days.  Denied episodes of bleeding.     Per 7/11 Psychiatry Consult:  On interview with resident, patient was somewhat guarded as he had already talked with the attending psychiatrist who is his outpatient provider. He did complain of poor sleep, which he attributed to not being on his home medication and being woken up throughout the night by hospital staff. Mentioned some anxiety over his upcoming surgery. Denied any depressed mood. Patient denied a history of anxiety and reported that  he is unsure why he takes Xanax. He denied any past psychiatric hospitalizations and any other psychiatric diagnoses other than insomnia. Patient did mention that he follows with Dr. Krueger and that he plans to continue receiving outpatient care with him after discharge.     Per 8/13 Psychiatry Consult:  Patient seen resting in bed comfortably, states that he has been feeling depressed with being stuck in bed 24/7 as he has been hospitalized since 6/27. Voices that he has some interest in starting on some medications for depressed mood at this time. Denies being on any psychiatric medications for mood besides some xanax by Dr. Krueger. He has been feeling disappointed after he was unable to pass the barium swallow. Denies SI, HI, AVH.    Psychiatric Review of Systems-is patient experiencing or having changes in  sleep: yes  appetite: no  weight: no  energy/anergy: yes  interest/pleasure/anhedonia: no  somatic symptoms: no  libido: did not assess  anxiety/panic: yes  guilty/hopelessness: yes  concentration: no  S.I.B.s/risky behavior: no  any drugs: no  alcohol: no     Allergies:  Lisinopril and Hydralazine analogues    Past Medical/Surgical History:  Past Medical History:   Diagnosis Date    Anticoagulant long-term use     CHF (congestive heart failure)     Class 1 obesity due to excess calories with serious comorbidity and body mass index (BMI) of 31.0 to 31.9 in adult     Dilated cardiomyopathy 1/10/2018    Disorder of kidney and ureter     CKD    Encounter for blood transfusion     Gout     HTN (hypertension)     Hx of psychiatric care     ICD (implantable cardioverter-defibrillator) infection 7/1/2020    Psychiatric problem     Thyroid disease     Ventricular tachycardia (paroxysmal)      Past Surgical History:   Procedure Laterality Date    CARDIAC CATHETERIZATION  Dec. 2012    CARDIAC DEFIBRILLATOR PLACEMENT Left     CRRT-D    COLONOSCOPY N/A 3/6/2018    Procedure: COLONOSCOPY;  Surgeon:  Alonso Bone MD;  Location: [facility] ENDO (2ND FLR);  Service: Endoscopy;  Laterality: N/A;    COLONOSCOPY N/A 7/17/2019    Procedure: COLONOSCOPY;  Surgeon: Blane Valdez MD;  Location: [facility] ENDO (2ND FLR);  Service: Endoscopy;  Laterality: N/A;    COLONOSCOPY N/A 7/18/2019    Procedure: COLONOSCOPY;  Surgeon: Blane Valdez MD;  Location: [facility] ENDO (2ND FLR);  Service: Endoscopy;  Laterality: N/A;    ESOPHAGOGASTRODUODENOSCOPY N/A 7/17/2019    Procedure: EGD (ESOPHAGOGASTRODUODENOSCOPY);  Surgeon: Blane Valdez MD;  Location: [facility] ENDO (2ND FLR);  Service: Endoscopy;  Laterality: N/A;    ESOPHAGOGASTRODUODENOSCOPY N/A 7/18/2019    Procedure: EGD (ESOPHAGOGASTRODUODENOSCOPY);  Surgeon: Blane Valdez MD;  Location: [facility] ENDO (2ND FLR);  Service: Endoscopy;  Laterality: N/A;    NONINVASIVE CARDIAC ELECTROPHYSIOLOGY STUDY N/A 10/18/2019    Procedure: CARDIAC ELECTROPHYSIOLOGY STUDY, NONINVASIVE;  Surgeon: Raz Wagner MD;  Location: [facility] EP LAB;  Service: Cardiology;  Laterality: N/A;  VT, DFTs, MDT CRTD in situ, LVAD, LASHELL pizarro, 3098    REPLACEMENT OF IMPLANTABLE CARDIOVERTER-DEFIBRILLATOR (ICD) GENERATOR N/A 3/9/2020    Procedure: REPLACEMENT, ICD GENERATOR;  Surgeon: Harry Yun MD;  Location: [facility] EP LAB;  Service: Cardiology;  Laterality: N/A;  VT, ICD Gen Change and Lead Revision, MDT, MAC, DM,3 Prep    REVISION OF IMPLANTABLE CARDIOVERTER-DEFIBRILLATOR (ICD) ELECTRODE LEAD PLACEMENT N/A 3/9/2020    Procedure: REVISION, INSERTION, ELECTRODE LEAD, ICD;  Surgeon: Harry Yun MD;  Location: [facility] EP LAB;  Service: Cardiology;  Laterality: N/A;  VT, ICD Gen Change and Lead Revision, MDT, MAC, DM,3 Prep    TREATMENT OF CARDIAC ARRHYTHMIA  10/18/2019    Procedure: Cardioversion or Defibrillation;  Surgeon: Raz Wagner MD;  Location: [facility] EP LAB;  Service: Cardiology;;       Past Psychiatric History:  Previous Medication Trials: yes, xanax, ambien  Previous Psychiatric Hospitalizations: no   Previous  "Suicide Attempts: no   History of Violence: unknown  Outpatient Psychiatrist: yes, Dr. Krueger     Social History:  Marital Status:   Children: 3   Employment Status/Info: on disability  Education:  college  Special Ed: no  Housing Status: with family   History of phys/sexual abuse: unknown  Access to gun: no    Substance Abuse History:  Recreational Drugs:  denied  Use of Alcohol: denied  Rehab History: unknown   Tobacco Use: no  Use of OTC: denied    Legal History:  Past Charges/Incarcerations: unknown   Pending charges: unknown     Family Psychiatric History:   unknown    Psychosocial Stressors: health  Functioning Relationships: good support system        Hospital Course: 8/14:  NAMainor. Had some CP but EKG normal and not requiring nitro. Required prn zofran for emesis. Had PM dose of Remeron, no Buspar or Xanax PRNs ordered.    On evaluation this AM the patient is in a calm and cooperative mood, staring out the window but making eye contact on questioning. He says he slept well last night for 5 hours. He does not claim SI and has minimal interaction with staff, appearing tired. Per staff he slept a lot during the day. He is counseled on the importance of staying active while in hospital and he nods, he says "the suns out". He is also counseled on his Xanax being used with the Remeron, vs. The Ambien and he voices understanding. He has no questions for staff.    8/15:  Pt had elevated glucose and hyperkalemia overnight requiring fluid bolus and lasix. Required PRN hydroxyzine and melatonin for sleep as well. Did not receive PRN Xanax.    Patient seen working with PT this morning. He is calm and cooperative. States that he slept "off and on" last night and is not sure how many hours he slept. States that this is due to to being in bed "24/7". Reports that he feels tired and denies any significant change in his sleep. He denies any problems with Remeron or Buspar. Denies any complaints or questions at " "this time.     :  No acute events overnight. Patient greeted at bedside this morning. States that he slept better last night and got 7 hours of sleep. States that his Remeron and Buspar are working well. Denies current depression or anxiety. Denies SI/HI/AVH. Denies any complaints at this time.          Family History       Problem Relation (Age of Onset)    Cancer Sister (54)    Coronary artery disease Father    Diabetes Father    Heart disease Father    Hypertension Father    No Known Problems Mother, Brother          Tobacco Use    Smoking status: Former Smoker     Packs/day: 1.00     Years: 31.00     Pack years: 31.00     Types: Cigarettes     Quit date: 2018     Years since quittin.5    Smokeless tobacco: Never Used    Tobacco comment: pt is quiting on his own - pt stated not qualified for program;  pt  quit on his own   Substance and Sexual Activity    Alcohol use: No     Alcohol/week: 0.0 standard drinks     Comment: quit    Drug use: No    Sexual activity: Yes     Partners: Female     Birth control/protection: None     Comment: 10/5/17  with same partner 7 years      Psychotherapeutics (From admission, onward)                Start     Stop Route Frequency Ordered    22 1500  busPIRone tablet 5 mg         -- PER NG TUBE 3 times daily 22 1224    22 1323  ALPRAZolam tablet 1 mg         -- PER NG TUBE Daily PRN 22 1224    22 2100  mirtazapine tablet 15 mg         -- Oral Nightly 22 1121             Review of Systems  Objective:     Vital Signs (Most Recent):  Temp: 97.7 °F (36.5 °C) (22 1500)  Pulse: 75 (22 1700)  Resp: (!) 24 (22 1700)  BP: (!) 70/0 (22 1500)  SpO2: 97 % (22 1322)   Vital Signs (24h Range):  Temp:  [97.5 °F (36.4 °C)-98 °F (36.7 °C)] 97.7 °F (36.5 °C)  Pulse:  [69-79] 75  Resp:  [17-37] 24  SpO2:  [95 %-100 %] 97 %  BP: (66-78)/(0) 70/0  Arterial Line BP: ()/(54-90) 95/84     Height: 6' 1" " "(185.4 cm)  Weight: 81.2 kg (179 lb)  Body mass index is 23.62 kg/m².      Intake/Output Summary (Last 24 hours) at 8/16/2022 1720  Last data filed at 8/16/2022 1700  Gross per 24 hour   Intake 2740.6 ml   Output 1725 ml   Net 1015.6 ml       Mental Status Exam:  Appearance: groomed, in hospital gown, sitting on edge of bed  Behavior/Cooperation: cooperative, fair eye contact  Speech: normal rate, rhythm, prasody  Mood: "bored"  Affect: reactive, appropriate  Thought Process: linear and goal oriented  Thought Content: denies SI, HI, AVH  Orientation: A&Ox4  Memory: recent and remote intact  Attention Span/Concentration: intact  Insight: fair  Judgment: fair    Significant Labs: All pertinent labs within the past 24 hours have been reviewed.    Significant Imaging: I have reviewed all pertinent imaging results/findings within the past 24 hours.       Scheduled Medications:   acetylcysteine 100 mg/ml (10%)  4 mL Nebulization Q6H    amiodarone  400 mg Oral Daily    aspirin  81 mg Per OG tube Daily    busPIRone  5 mg Per NG tube TID    calcium gluconate IVPB  1 g Intravenous Once    dextrose 10 % in water (D10W)  25 g Intravenous Once    docusate  100 mg Per NG tube Daily    levalbuterol  0.63 mg Nebulization Q6H    magnesium oxide  400 mg Oral BID    [START ON 8/17/2022] methIMAzole  5 mg Oral Daily    mexiletine  400 mg Oral Q8H    mirtazapine  15 mg Oral QHS    ondansetron  4 mg Intravenous Once    pantoprazole  40 mg Per NG tube Daily    polyethylene glycol  17 g Per NG tube Daily    [START ON 8/17/2022] predniSONE  20 mg Per NG tube Daily       PRN Medications:  sodium chloride 0.9%, sodium chloride 0.9%, acetaminophen, ALPRAZolam, artificial tears, barium sulfate, barium sulfate, bisacodyL, [COMPLETED] calcium gluconate IVPB **AND** calcium gluconate IVPB, [COMPLETED] calcium gluconate IVPB **AND** calcium gluconate IVPB, calcium gluconate IVPB **AND** calcium gluconate IVPB, dextrose 10 % in water " (D10W), dextrose 10%, dextrose 10%, dextrose 10%, dextrose 10%, dextrose 10%, [COMPLETED] dextrose 10% **AND** dextrose 10% **AND** [COMPLETED] insulin regular, dextrose 10%, glucagon (human recombinant), glucose, guaiFENesin 100 mg/5 ml, HYDROmorphone, HYDROmorphone, hydrOXYzine HCL, insulin aspart U-100, melatonin, nitroGLYCERIN    Review of patient's allergies indicates:   Allergen Reactions    Lisinopril Anaphylaxis    Hydralazine analogues      Chronic constipation, impotence, dizziness       Assessment/Plan:     Adjustment disorder with mixed anxiety and depressed mood  Tim Richards is a 56 y.o. male with a past psychiatric history of generalized anxiety disorder, adjustment disorder, and insomnia who presented to [facility] [place] due to Left ventricular assist device (LVAD) complication. Psychiatry was consulted for depression. Remeron initiated 8/12. Patient reports improvement in sleep. Denies current depression or anxiety.     IMPRESSION  Insomnia   Adjustment Disorder with mixed anxiety and depressed mood     - Continue Remeron 15mg qhs; plan to continue to assess efficacy of remeron for sleep; consider switch to home ambien CR 12.5mg PRN qhs  - Continue home xanax 1mg PRN daily  - Continue buspar 5mg TID  - 8/14 EKG QTc 379  - Please limit nighttime interruptions as much as possible.   - Patient does not meet criteria for PEC or inpatient psychiatric admission at this time. Patient is not currently an imminent danger to self or others and is not gravely disabled due to a psychiatric illness.         Psychiatry will sign off at this time. Please contact if any additional psychiatric concerns arise    Need for Continued Hospitalization:  No need for inpatient psychiatric hospitalization. Continue medical care as per the primary team.      Total time:  15 with greater than 50% of this time spent in counseling and/or coordination of care.       Roxy Arita MD   Psychiatry  [facility]

## 2022-08-16 NOTE — ASSESSMENT & PLAN NOTE
Key History and Diagnostic Findings  - History of AIT type 2 (TRAb and TSI negative; Thyroid US unremarkable with low vascularity)  - Weaned off methimazole and prednisone by May 2020, then developed hypothyroidism, Levothyroxine started and dose titrated per TFTs. Prior to current admission he was on Levothyroxine 112 mcg daily  - Labs consistent with hypothyroidism until current admission, initially on 7/13, with low TSH and elevated fT4. Repeat TFTs on 7/27 with worsening hyperthyroidism. He continued to receive LT4 112 mcg through 7/28. He remains on amiodarone for episodes of Ventricular Tachycardia  - Suspect current hyperthyroidism is likely due to AIT-2, however continued exogenous LT4 certainly contribute to current presentation   - Free T4 today at 1.24    Lab Results   Component Value Date    TSH 0.127 (L) 08/14/2022    TSH <0.010 (L) 07/27/2022    TSH 0.111 (L) 07/13/2022    FREET4 1.24 08/16/2022    FREET4 1.46 08/15/2022    FREET4 1.39 08/14/2022     Plan  - Strongly suspect AIT type 2 with improvement seen currently with the use of prednisone.   - Heparin can cause a false elevation in free T4 as it displaces free T4 from TBG; will follow-up direct dialysis result collected 8.5.22 -- lab was canceled by receiving lab as sample insufficient to perform lab   - Will discontinue methimazole, last dose 8/16/2022  - Decrease prednisone to 20 mg qd on 8/17 - continue to taper   - Stop daily fT4, will check free T4 again on 8.19.2022

## 2022-08-16 NOTE — PLAN OF CARE
"      SICU PLAN OF CARE NOTE    Dx: Left ventricular assist device (LVAD) complication    Shift Events: OOB to cardiac chair. BM x1    Goals of Care: MAP 65-85    Neuro: AAO x4, Follows Commands and Moves All Extremities    Vital Signs: BP (!) 70/0 (BP Location: Right arm, Patient Position: Lying)   Pulse 73   Temp 97.5 °F (36.4 °C) (Oral)   Resp (!) 24   Ht 6' 1" (1.854 m)   Wt 81.2 kg (179 lb)   SpO2 97%   BMI 23.62 kg/m²     Respiratory: Trach Collar 5L/ 28%    Diet: Tube Feeds Novasource Renal @ 45mL/hr  Per Speech of to have thickened liquids and ice chips to practice exercises with.    Gtts: Heparin @ 23 u/kg/hr    Urine Output: Voids Spontaneously 700 cc/shift    Drains: L. NGT, unclamped. TF running continuously.     VAD HM3   -Speed: 6300  -LSL: 5900  -Flows: 5.3-5.8   -PI: 1.4-3.5  -Power: 5.2-5.5     Labs q8hr, Mag >2.5, K @4.5, PTT 39-54  Accuchecks: Q4    Skin: Daily LVAD sterile dressing change performed by RN. Right abdomen dressing changed. R. Groin dressing, CDI. Patient on Immerse mattress.        "

## 2022-08-16 NOTE — ASSESSMENT & PLAN NOTE
Tim Richards is a 56 y.o. male with a past psychiatric history of generalized anxiety disorder, adjustment disorder, and insomnia who presented to [facility] [place] due to Left ventricular assist device (LVAD) complication. Psychiatry was consulted for depression. Remeron initiated 8/12. Patient reports improvement in sleep. Denies current depression or anxiety.     IMPRESSION  Insomnia   Adjustment Disorder with mixed anxiety and depressed mood     - Continue Remeron 15mg qhs; plan to continue to assess efficacy of remeron for sleep; consider switch to home ambien CR 12.5mg PRN qhs  - Continue home xanax 1mg PRN daily  - Continue buspar 5mg TID  - 8/14 EKG QTc 379  - Please limit nighttime interruptions as much as possible.   - Patient does not meet criteria for PEC or inpatient psychiatric admission at this time. Patient is not currently an imminent danger to self or others and is not gravely disabled due to a psychiatric illness.

## 2022-08-16 NOTE — PROGRESS NOTES
[facility]  Endocrinology  Progress Note    Admit Date: 6/27/2022     56 year-old  male with NICM with LVAD, s/p ICD for VT on amiodarone and mexiletine and AIT followed by hypothyroidism initially admitted 6/27/2022 with COVID respiratory failure complicated with LVAD pump thrombosis that was refractory to medical therapy. Underwent LVAD pump exchange. Post-op course complicated by worsening cardiogenic shock/RV failure requiring VA-ECMO. Improved clinically underwent successful decannulation. Continued episodes of VT with ICD shock 7/21 and ongoing anti-arrhythmic mediation adjustments. TFTs on 7/27 significant for recurrent hyperthyroidism with undetectable TSH and elevated fT4.    - Patient re-intubated 07/29/2022    - Endocrinology consulted for recurrent AIT    Regarding AIT:    - Last seen outpatient by Dr. 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 He required a prolonged course of prednisone and methimazole, then subsequently developed hypothyroidism in May 2020. LT4 was adjusted based on serial TFT measurements. Most recently levothyroxine dose has been 112 mcg     - He self-decreased his amiodarone dose to 200 mg daily about 6 months ago. T4 was high on 7/13, but he was continued on levothyroxine 112 mcg    Lab Results   Component Value Date    TSH <0.010 (L) 07/27/2022    TSH 0.111 (L) 07/13/2022    TSH 4.079 (H) 03/17/2022    FREET4 2.51 (H) 08/07/2022    FREET4 2.95 (H) 08/06/2022    FREET4 2.18 (H) 08/05/2022      Latest Reference Range & Units 08/07/22 03:42 08/08/22 03:10 08/09/22 03:29 08/10/22 03:33   Free T4 0.71 - 1.51 ng/dL 2.51 (H) 2.43 (H) 2.23 (H) 2.12 (H)       Interval HPI:   Overnight events: fT4 continues to downtrend with decreasing methimazole and pred. Will d/c methimazole after this morning's dose and continue to wean pred. BG 70s-80s this AM after receiving 10u  "regular insulin as part of hyperkalemia order set.  Eating:   NPO  Nausea: No  Hypoglycemia and intervention: No  Fever: No  TPN and/or TF: Yes  If yes, type of TF/TPN and rate: Novasource 45 mL/hr    BP (!) 70/0 (BP Location: Right arm, Patient Position: Lying)   Pulse 73   Temp 97.5 °F (36.4 °C) (Oral)   Resp (!) 24   Ht 6' 1" (1.854 m)   Wt 81.2 kg (179 lb)   SpO2 97%   BMI 23.62 kg/m²     Labs Reviewed and Include    Recent Labs   Lab 08/16/22  0342 08/16/22  0532 08/16/22  1154      < > 143*   CALCIUM 9.4   < > 9.0   ALBUMIN 2.2*  2.2*  --   --    PROT 6.4  6.5  --   --       < > 141   K 4.9   < > 5.6*  5.6*   CO2 15*   < > 16*   *   < > 116*   BUN 43*   < > 44*   CREATININE 1.1   < > 1.3   ALKPHOS 226*  224*  --   --    ALT 61*  59*  --   --    AST 53*  53*  --   --    BILITOT 2.4*  2.4*  --   --     < > = values in this interval not displayed.     Lab Results   Component Value Date    WBC 20.82 (H) 08/16/2022    HGB 8.5 (L) 08/16/2022    HCT 29.9 (L) 08/16/2022     (H) 08/16/2022     08/16/2022     Recent Labs   Lab 08/14/22  1936 08/15/22  0346 08/16/22  0342   TSH 0.127*  --   --    FREET4 1.39 1.46 1.24     Lab Results   Component Value Date    HGBA1C 5.4 04/26/2021       Nutritional status:   Body mass index is 23.62 kg/m².  Lab Results   Component Value Date    ALBUMIN 2.2 (L) 08/16/2022    ALBUMIN 2.2 (L) 08/16/2022    ALBUMIN 2.2 (L) 08/15/2022     Lab Results   Component Value Date    PREALBUMIN 32 08/11/2022    PREALBUMIN 28 08/05/2022    PREALBUMIN 13 (L) 07/29/2022       Estimated Creatinine Clearance: 72.6 mL/min (based on SCr of 1.3 mg/dL).    Accu-Checks  Recent Labs     08/15/22  0803 08/15/22  0804 08/15/22  1245 08/15/22  2102 08/16/22  0007 08/16/22  0341 08/16/22  0623 08/16/22  0728 08/16/22  0815 08/16/22  1008   POCTGLUCOSE 35* 105 136* 118* 93 105 118* 81 79 113*       Current Medications and/or Treatments Impacting Glycemic " Control  Immunotherapy:    Immunosuppressants       None          Steroids:   Hormones (From admission, onward)                Start     Stop Route Frequency Ordered    08/17/22 0900  predniSONE tablet 20 mg         -- PER NG TUBE Daily 08/16/22 1000    08/02/22 1931  melatonin tablet 6 mg         -- OG Nightly PRN 08/02/22 1832          Pressors:    Autonomic Drugs (From admission, onward)                Start     Stop Route Frequency Ordered    07/29/22 0011  EPINEPHrine (ADRENALIN) 1 mg/mL injection        Note to Pharmacy: Created by cabinet override    07/29 1214   07/29/22 0011          Hyperglycemia/Diabetes Medications:   Antihyperglycemics (From admission, onward)                Start     Stop Route Frequency Ordered    08/15/22 1200  insulin aspart U-100 pen 3 Units         -- SubQ 6 times per day 08/15/22 1011    07/30/22 1023  insulin aspart U-100 pen 0-5 Units         -- SubQ Every 4 hours PRN 07/30/22 0925            ASSESSMENT and PLAN    Amiodarone-induced hyperthyroidism  Key History and Diagnostic Findings  - History of AIT type 2 (TRAb and TSI negative; Thyroid US unremarkable with low vascularity)  - Weaned off methimazole and prednisone by May 2020, then developed hypothyroidism, Levothyroxine started and dose titrated per TFTs. Prior to current admission he was on Levothyroxine 112 mcg daily  - Labs consistent with hypothyroidism until current admission, initially on 7/13, with low TSH and elevated fT4. Repeat TFTs on 7/27 with worsening hyperthyroidism. He continued to receive LT4 112 mcg through 7/28. He remains on amiodarone for episodes of Ventricular Tachycardia  - Suspect current hyperthyroidism is likely due to AIT-2, however continued exogenous LT4 certainly contribute to current presentation   - Free T4 today at 1.24    Lab Results   Component Value Date    TSH 0.127 (L) 08/14/2022    TSH <0.010 (L) 07/27/2022    TSH 0.111 (L) 07/13/2022    FREET4 1.24 08/16/2022    FREET4 1.46  08/15/2022    FREET4 1.39 08/14/2022     Plan  - Strongly suspect AIT type 2 with improvement seen currently with the use of prednisone.   - Heparin can cause a false elevation in free T4 as it displaces free T4 from TBG; will follow-up direct dialysis result collected 8.5.22 -- lab was canceled by receiving lab as sample insufficient to perform lab   - Will continue methimazole 5mg daily and re-evaluate outpatient  - Decrease prednisone to 20 mg qd on 8/17 - will continue this dose at d/c and re-evaluate outpatient   - Stop daily fT4, will check free T4 again on 8.19.2022    amiodarone induced hypothyrodism  - Levothyroxine discontinued on 7/28/2022  - Please see plan as above    Hyperglycemia  Key History and Diagnostic Findings  - Hyperglycemia noted once starting TPN in addition to steroids  - No prior history of diabetes; most recent A1c of 5.4 on 04/26/2021  - Blood sugars tight in last 24 hours --even after stopping scheduled levemir and reducing scheduled aspart   - With decreasing steroids anticipate decreased insulin requirement.     Plan  - Discontinue scheduled aspart with TF -- will continue LDC only  - Please notify endocrine if any change in tube feeds as this will change insulin requirements.  - Please hold insulin if tube feeds are held for some reason  - Please ensure that accuchecks are being documented every 4 hours    Chronic combined systolic and diastolic heart failure  Managed per cardiology          María Brice MD  Endocrinology  [facility]

## 2022-08-16 NOTE — PROGRESS NOTES
[facility]  Heart Failure  Progress Note     Hospital Length of Stay: 50  Interval History:  - Continue tube feeding.   - Working with PT/OT/SLP.   - Still having issue with hyperkalemia.     Vitals:  Temp:  [97.5 °F (36.4 °C)-98 °F (36.7 °C)] 97.5 °F (36.4 °C)  Pulse:  [69-99] 75  Resp:  [17-37] 24  SpO2:  [95 %-100 %] 96 %  BP: (66-78)/(0) 70/0  Arterial Line BP: (66-91)/(56-79) 70/59    Intake/Output    Intake/Output Summary (Last 24 hours) at 8/16/2022 1100  Last data filed at 8/16/2022 1000  Gross per 24 hour   Intake 2687.16 ml   Output 1825 ml   Net 862.16 ml     Physical Exam  Gen: Trach in place. No evidence of bleeding. Alert and awake.   HEENT: Pupils equal and reactive to light.   Cardio: Regular rate, VAD hum obstructing heart sounds  Resp: No additional sound heard.   Abd: Soft, non-tender, non-distended  Skin: Warm,  trace peripheral edema  Neuro: No gross abnormalities.   Psych: Normal mood and affect.      Labs:  Recent Labs   Lab 08/15/22  1247 08/15/22  2102 08/16/22  0342 08/16/22  0441   WBC 24.28* 22.14* 21.01*  --    HGB 8.3* 8.9* 8.5*  --    HCT 28.7* 32.7* 30.3* 27*    179 160  --      Recent Labs   Lab 08/15/22  1424 08/15/22  2102 08/16/22  0342 08/16/22  0532 08/16/22  0726    140 139  --  142   K 5.4* 5.6* 4.9 5.6* 4.6  4.6   * 117* 116*  --  117*   CO2 18* 15* 15*  --  18*   BUN 45* 44* 43*  --  43*   CREATININE 1.1 1.1 1.1  --  1.1   * 125* 108  --  79   CALCIUM 9.3 9.4 9.4  --  9.1   MG 2.6 2.6 2.7*  --   --    PHOS 2.5* 3.0 2.9  --   --        Micro:    Current Meds:   acetylcysteine 100 mg/ml (10%)  4 mL Nebulization Q6H    amiodarone  400 mg Oral Daily    aspirin  81 mg Per OG tube Daily    busPIRone  5 mg Per NG tube TID    dextrose 10 % in water (D10W)  25 g Intravenous Once    docusate  100 mg Per NG tube Daily    insulin aspart U-100  3 Units Subcutaneous 6 times per day    levalbuterol  0.63 mg Nebulization Q6H     magnesium oxide  400 mg Oral BID    mexiletine  400 mg Oral Q8H    mirtazapine  15 mg Oral QHS    ondansetron  4 mg Intravenous Once    pantoprazole  40 mg Per NG tube Daily    polyethylene glycol  17 g Per NG tube Daily    [START ON 8/17/2022] predniSONE  20 mg Per NG tube Daily       Continuous Infusions:   dextrose 10 % in water (D10W)      heparin (porcine) in D5W 23 Units/kg/hr (08/16/22 1000)     Assessment and Plan:  Cardiogenic Shock  -Previous HM2, underwent pump exchange to HM3 on 7/13/22 complicated by worsening CS/RV failure requiring VA ECMO now decannulated  Currently trach ed. Chest tube removal, bronch, and old driveline removed 7/22.  -Re-intubated on 7/29/22 due to hypercapnic resp failure. Trach placed on 8/4.   -Doing well on Trach collar.   -Off diuretics currently. Creatinine has trended down to baseline.   -Plan to transfer him out to step down unit tomorrow.      Trach site bleeding: Resolved.   - Continue minimal dosing heparin.   - Surgical team placed a suture on 8/11.       LVAD:  - No significant events.   - Functioning well.     Fever: more than a week ago.   Leucocytosis trending down albeit slowly.   ID team on board.   Changed to meropenem . Will complete 7 days of meropenem today.   No growth in cultures yet.      History of ventricular tachycardia/Episode of A flutter 2:1 block:  Patient experienced an episode of VT on 6/30 and experienced an ICD shock thought to be related to hypokalemia (K of 3.1 on 6/30)  Aggressively replete K, keep K>4 and Mg>2  Continue mexiletine PO.  Amio gtt transitioned to PO.     COVID-19 virus infection:  -Previously hypoxic then recovered  -ID was consulted, recommended Remdesivir, course completed     Elevated serum lactate dehydrogenase (LDH):  S/p HM3 exchange for HM2 pump thrombosis  Continue to trend LDH.      Amiodarone induced hypothyrodism initially, now hyperthyroidism:  -Endocrine team on board.  -On prednisone and methimazole.   -  Prednisone 40 mg.   -Continue to monitor free T4. Trending down.   - Medications has been changes as per Endocrinology team.            Scot Card MD, FACP  Cardiovascular Disease Fellow PGY4  [facility]

## 2022-08-16 NOTE — ASSESSMENT & PLAN NOTE
Key History and Diagnostic Findings  - History of AIT type 2 (TRAb and TSI negative; Thyroid US unremarkable with low vascularity)  - Weaned off methimazole and prednisone by May 2020, then developed hypothyroidism, Levothyroxine started and dose titrated per TFTs. Prior to current admission he was on Levothyroxine 112 mcg daily  - Labs consistent with hypothyroidism until current admission, initially on 7/13, with low TSH and elevated fT4. Repeat TFTs on 7/27 with worsening hyperthyroidism. He continued to receive LT4 112 mcg through 7/28. He remains on amiodarone for episodes of Ventricular Tachycardia  - Suspect current hyperthyroidism is likely due to AIT-2, however continued exogenous LT4 certainly contribute to current presentation   - fT4 now normal    Lab Results   Component Value Date    TSH 0.127 (L) 08/14/2022    TSH <0.010 (L) 07/27/2022    TSH 0.111 (L) 07/13/2022    FREET4 1.24 08/16/2022    FREET4 1.46 08/15/2022    FREET4 1.39 08/14/2022     Plan  - Strongly suspect AIT type 2 with improvement seen currently with the use of prednisone.   - Heparin can cause a false elevation in free T4 as it displaces free T4 from TBG; will follow-up direct dialysis result collected 8.5.22 -- lab was cancelled by receiving lab as sample insufficient to perform lab   - Will continue methimazole 5mg daily and prednisone 20 mg daily and re-evaluate outpatient  - Decrease prednisone to 20 mg qd on 8/17 - will continue this dose at d/c and re-evaluate outpatient   - Stop daily fT4, will check fT4 again on 8/19/2022

## 2022-08-16 NOTE — PROGRESS NOTES
Update    Pt presents as AAO x4, calm, cooperative, and asking and answering questions appropriately, caregivers not at bedside. Pt states he is doing well and enjoyed working with Ayesha with Speech today. Pt watching history channel and sitting up in PT chair. PT states he would like to get back in his bed soon, LCSW informed bedside RN. LCSW offered support and encouragement. SW providing ongoing psychosocial, counseling, & emotional support, education, resources, assistance, and discharge planning as indicated.  SW to continue to follow.

## 2022-08-17 LAB
ALBUMIN SERPL BCP-MCNC: 2.3 G/DL (ref 3.5–5.2)
ALP SERPL-CCNC: 260 U/L (ref 55–135)
ALT SERPL W/O P-5'-P-CCNC: 75 U/L (ref 10–44)
ANION GAP SERPL CALC-SCNC: 7 MMOL/L (ref 8–16)
ANION GAP SERPL CALC-SCNC: 8 MMOL/L (ref 8–16)
APTT BLDCRRT: 132.9 SEC (ref 21–32)
APTT BLDCRRT: 46.1 SEC (ref 21–32)
APTT BLDCRRT: 51.3 SEC (ref 21–32)
AST SERPL-CCNC: 56 U/L (ref 10–40)
BASOPHILS # BLD AUTO: 0.02 K/UL (ref 0–0.2)
BASOPHILS # BLD AUTO: 0.03 K/UL (ref 0–0.2)
BASOPHILS # BLD AUTO: 0.04 K/UL (ref 0–0.2)
BASOPHILS NFR BLD: 0.1 % (ref 0–1.9)
BASOPHILS NFR BLD: 0.2 % (ref 0–1.9)
BASOPHILS NFR BLD: 0.2 % (ref 0–1.9)
BILIRUB DIRECT SERPL-MCNC: 1.6 MG/DL (ref 0.1–0.3)
BILIRUB SERPL-MCNC: 2.2 MG/DL (ref 0.1–1)
BNP SERPL-MCNC: 326 PG/ML (ref 0–99)
BUN SERPL-MCNC: 43 MG/DL (ref 6–20)
BUN SERPL-MCNC: 45 MG/DL (ref 6–20)
BUN SERPL-MCNC: 45 MG/DL (ref 6–20)
BUN SERPL-MCNC: 46 MG/DL (ref 6–20)
CALCIUM SERPL-MCNC: 9.3 MG/DL (ref 8.7–10.5)
CALCIUM SERPL-MCNC: 9.5 MG/DL (ref 8.7–10.5)
CALCIUM SERPL-MCNC: 9.5 MG/DL (ref 8.7–10.5)
CALCIUM SERPL-MCNC: 9.6 MG/DL (ref 8.7–10.5)
CHLORIDE SERPL-SCNC: 113 MMOL/L (ref 95–110)
CHLORIDE SERPL-SCNC: 115 MMOL/L (ref 95–110)
CHLORIDE SERPL-SCNC: 116 MMOL/L (ref 95–110)
CHLORIDE SERPL-SCNC: 116 MMOL/L (ref 95–110)
CO2 SERPL-SCNC: 17 MMOL/L (ref 23–29)
CO2 SERPL-SCNC: 19 MMOL/L (ref 23–29)
CO2 SERPL-SCNC: 19 MMOL/L (ref 23–29)
CO2 SERPL-SCNC: 20 MMOL/L (ref 23–29)
CREAT SERPL-MCNC: 1.1 MG/DL (ref 0.5–1.4)
CREAT SERPL-MCNC: 1.1 MG/DL (ref 0.5–1.4)
CREAT SERPL-MCNC: 1.2 MG/DL (ref 0.5–1.4)
CREAT SERPL-MCNC: 1.4 MG/DL (ref 0.5–1.4)
CRP SERPL-MCNC: 4.3 MG/L (ref 0–8.2)
DIFFERENTIAL METHOD: ABNORMAL
EOSINOPHIL # BLD AUTO: 0 K/UL (ref 0–0.5)
EOSINOPHIL # BLD AUTO: 0.1 K/UL (ref 0–0.5)
EOSINOPHIL # BLD AUTO: 0.1 K/UL (ref 0–0.5)
EOSINOPHIL NFR BLD: 0.1 % (ref 0–8)
EOSINOPHIL NFR BLD: 0.3 % (ref 0–8)
EOSINOPHIL NFR BLD: 0.3 % (ref 0–8)
ERYTHROCYTE [DISTWIDTH] IN BLOOD BY AUTOMATED COUNT: 19.7 % (ref 11.5–14.5)
ERYTHROCYTE [DISTWIDTH] IN BLOOD BY AUTOMATED COUNT: 20 % (ref 11.5–14.5)
ERYTHROCYTE [DISTWIDTH] IN BLOOD BY AUTOMATED COUNT: 20.3 % (ref 11.5–14.5)
EST. GFR  (NO RACE VARIABLE): 59.4 ML/MIN/1.73 M^2
EST. GFR  (NO RACE VARIABLE): >60 ML/MIN/1.73 M^2
FIBRINOGEN PPP-MCNC: 550 MG/DL (ref 182–400)
GLUCOSE SERPL-MCNC: 121 MG/DL (ref 70–110)
GLUCOSE SERPL-MCNC: 136 MG/DL (ref 70–110)
GLUCOSE SERPL-MCNC: 138 MG/DL (ref 70–110)
GLUCOSE SERPL-MCNC: 146 MG/DL (ref 70–110)
HCT VFR BLD AUTO: 29 % (ref 40–54)
HCT VFR BLD AUTO: 31.1 % (ref 40–54)
HCT VFR BLD AUTO: 31.3 % (ref 40–54)
HGB BLD-MCNC: 8.4 G/DL (ref 14–18)
HGB BLD-MCNC: 8.4 G/DL (ref 14–18)
HGB BLD-MCNC: 8.6 G/DL (ref 14–18)
IMM GRANULOCYTES # BLD AUTO: 0.41 K/UL (ref 0–0.04)
IMM GRANULOCYTES # BLD AUTO: 0.56 K/UL (ref 0–0.04)
IMM GRANULOCYTES # BLD AUTO: 0.6 K/UL (ref 0–0.04)
IMM GRANULOCYTES NFR BLD AUTO: 2.1 % (ref 0–0.5)
IMM GRANULOCYTES NFR BLD AUTO: 2.9 % (ref 0–0.5)
IMM GRANULOCYTES NFR BLD AUTO: 3.2 % (ref 0–0.5)
INR PPP: 1 (ref 0.8–1.2)
INR PPP: 1 (ref 0.8–1.2)
INR PPP: 1.1 (ref 0.8–1.2)
LYMPHOCYTES # BLD AUTO: 0.5 K/UL (ref 1–4.8)
LYMPHOCYTES # BLD AUTO: 0.5 K/UL (ref 1–4.8)
LYMPHOCYTES # BLD AUTO: 0.8 K/UL (ref 1–4.8)
LYMPHOCYTES NFR BLD: 2.5 % (ref 18–48)
LYMPHOCYTES NFR BLD: 2.5 % (ref 18–48)
LYMPHOCYTES NFR BLD: 4.1 % (ref 18–48)
MAGNESIUM SERPL-MCNC: 2.8 MG/DL (ref 1.6–2.6)
MCH RBC QN AUTO: 27.5 PG (ref 27–31)
MCH RBC QN AUTO: 27.6 PG (ref 27–31)
MCH RBC QN AUTO: 29.3 PG (ref 27–31)
MCHC RBC AUTO-ENTMCNC: 26.8 G/DL (ref 32–36)
MCHC RBC AUTO-ENTMCNC: 27.7 G/DL (ref 32–36)
MCHC RBC AUTO-ENTMCNC: 29 G/DL (ref 32–36)
MCV RBC AUTO: 100 FL (ref 82–98)
MCV RBC AUTO: 101 FL (ref 82–98)
MCV RBC AUTO: 103 FL (ref 82–98)
MONOCYTES # BLD AUTO: 0.8 K/UL (ref 0.3–1)
MONOCYTES # BLD AUTO: 1.1 K/UL (ref 0.3–1)
MONOCYTES # BLD AUTO: 1.2 K/UL (ref 0.3–1)
MONOCYTES NFR BLD: 3.9 % (ref 4–15)
MONOCYTES NFR BLD: 5.8 % (ref 4–15)
MONOCYTES NFR BLD: 6.6 % (ref 4–15)
NEUTROPHILS # BLD AUTO: 16 K/UL (ref 1.8–7.7)
NEUTROPHILS # BLD AUTO: 16.8 K/UL (ref 1.8–7.7)
NEUTROPHILS # BLD AUTO: 17.6 K/UL (ref 1.8–7.7)
NEUTROPHILS NFR BLD: 85.6 % (ref 38–73)
NEUTROPHILS NFR BLD: 88.3 % (ref 38–73)
NEUTROPHILS NFR BLD: 91.3 % (ref 38–73)
NRBC BLD-RTO: 0 /100 WBC
PHOSPHATE SERPL-MCNC: 2.3 MG/DL (ref 2.7–4.5)
PHOSPHATE SERPL-MCNC: 2.4 MG/DL (ref 2.7–4.5)
PHOSPHATE SERPL-MCNC: 2.4 MG/DL (ref 2.7–4.5)
PLATELET # BLD AUTO: 167 K/UL (ref 150–450)
PLATELET # BLD AUTO: 170 K/UL (ref 150–450)
PLATELET # BLD AUTO: 174 K/UL (ref 150–450)
PMV BLD AUTO: 12.6 FL (ref 9.2–12.9)
PMV BLD AUTO: 12.8 FL (ref 9.2–12.9)
PMV BLD AUTO: 12.9 FL (ref 9.2–12.9)
POCT GLUCOSE: 125 MG/DL (ref 70–110)
POCT GLUCOSE: 148 MG/DL (ref 70–110)
POCT GLUCOSE: 148 MG/DL (ref 70–110)
POCT GLUCOSE: 162 MG/DL (ref 70–110)
POCT GLUCOSE: 181 MG/DL (ref 70–110)
POTASSIUM SERPL-SCNC: 4 MMOL/L (ref 3.5–5.1)
POTASSIUM SERPL-SCNC: 4 MMOL/L (ref 3.5–5.1)
POTASSIUM SERPL-SCNC: 4.3 MMOL/L (ref 3.5–5.1)
POTASSIUM SERPL-SCNC: 4.6 MMOL/L (ref 3.5–5.1)
POTASSIUM SERPL-SCNC: 4.8 MMOL/L (ref 3.5–5.1)
PROT SERPL-MCNC: 6.3 G/DL (ref 6–8.4)
PROTHROMBIN TIME: 10.3 SEC (ref 9–12.5)
PROTHROMBIN TIME: 10.4 SEC (ref 9–12.5)
PROTHROMBIN TIME: 11.2 SEC (ref 9–12.5)
RBC # BLD AUTO: 2.87 M/UL (ref 4.6–6.2)
RBC # BLD AUTO: 3.05 M/UL (ref 4.6–6.2)
RBC # BLD AUTO: 3.12 M/UL (ref 4.6–6.2)
SODIUM SERPL-SCNC: 141 MMOL/L (ref 136–145)
SODIUM SERPL-SCNC: 143 MMOL/L (ref 136–145)
TRIGL SERPL-MCNC: 93 MG/DL (ref 30–150)
WBC # BLD AUTO: 18.66 K/UL (ref 3.9–12.7)
WBC # BLD AUTO: 19.02 K/UL (ref 3.9–12.7)
WBC # BLD AUTO: 19.29 K/UL (ref 3.9–12.7)

## 2022-08-17 PROCEDURE — 80048 BASIC METABOLIC PNL TOTAL CA: CPT | Performed by: STUDENT IN AN ORGANIZED HEALTH CARE EDUCATION/TRAINING PROGRAM

## 2022-08-17 PROCEDURE — 85730 THROMBOPLASTIN TIME PARTIAL: CPT | Performed by: THORACIC SURGERY (CARDIOTHORACIC VASCULAR SURGERY)

## 2022-08-17 PROCEDURE — 83735 ASSAY OF MAGNESIUM: CPT | Performed by: THORACIC SURGERY (CARDIOTHORACIC VASCULAR SURGERY)

## 2022-08-17 PROCEDURE — 85610 PROTHROMBIN TIME: CPT | Performed by: THORACIC SURGERY (CARDIOTHORACIC VASCULAR SURGERY)

## 2022-08-17 PROCEDURE — 99900035 HC TECH TIME PER 15 MIN (STAT)

## 2022-08-17 PROCEDURE — 25000003 PHARM REV CODE 250

## 2022-08-17 PROCEDURE — 80048 BASIC METABOLIC PNL TOTAL CA: CPT | Mod: 91 | Performed by: STUDENT IN AN ORGANIZED HEALTH CARE EDUCATION/TRAINING PROGRAM

## 2022-08-17 PROCEDURE — 25000003 PHARM REV CODE 250: Performed by: STUDENT IN AN ORGANIZED HEALTH CARE EDUCATION/TRAINING PROGRAM

## 2022-08-17 PROCEDURE — 20600001 HC STEP DOWN PRIVATE ROOM

## 2022-08-17 PROCEDURE — 99232 PR SUBSEQUENT HOSPITAL CARE,LEVL II: ICD-10-PCS | Mod: ,,, | Performed by: GENERAL ACUTE CARE HOSPITAL

## 2022-08-17 PROCEDURE — 63600175 PHARM REV CODE 636 W HCPCS: Performed by: STUDENT IN AN ORGANIZED HEALTH CARE EDUCATION/TRAINING PROGRAM

## 2022-08-17 PROCEDURE — 25000003 PHARM REV CODE 250: Performed by: INTERNAL MEDICINE

## 2022-08-17 PROCEDURE — 85025 COMPLETE CBC W/AUTO DIFF WBC: CPT | Performed by: THORACIC SURGERY (CARDIOTHORACIC VASCULAR SURGERY)

## 2022-08-17 PROCEDURE — 80076 HEPATIC FUNCTION PANEL: CPT | Performed by: STUDENT IN AN ORGANIZED HEALTH CARE EDUCATION/TRAINING PROGRAM

## 2022-08-17 PROCEDURE — 85730 THROMBOPLASTIN TIME PARTIAL: CPT | Mod: 91 | Performed by: INTERNAL MEDICINE

## 2022-08-17 PROCEDURE — 99233 SBSQ HOSP IP/OBS HIGH 50: CPT | Mod: ,,, | Performed by: INTERNAL MEDICINE

## 2022-08-17 PROCEDURE — 25000242 PHARM REV CODE 250 ALT 637 W/ HCPCS

## 2022-08-17 PROCEDURE — 27000221 HC OXYGEN, UP TO 24 HOURS

## 2022-08-17 PROCEDURE — 94668 MNPJ CHEST WALL SBSQ: CPT

## 2022-08-17 PROCEDURE — 86140 C-REACTIVE PROTEIN: CPT | Performed by: STUDENT IN AN ORGANIZED HEALTH CARE EDUCATION/TRAINING PROGRAM

## 2022-08-17 PROCEDURE — 27000248 HC VAD-ADDITIONAL DAY

## 2022-08-17 PROCEDURE — 25000003 PHARM REV CODE 250: Performed by: PHYSICIAN ASSISTANT

## 2022-08-17 PROCEDURE — 99900026 HC AIRWAY MAINTENANCE (STAT)

## 2022-08-17 PROCEDURE — 84478 ASSAY OF TRIGLYCERIDES: CPT | Performed by: INTERNAL MEDICINE

## 2022-08-17 PROCEDURE — 84100 ASSAY OF PHOSPHORUS: CPT | Mod: 91 | Performed by: THORACIC SURGERY (CARDIOTHORACIC VASCULAR SURGERY)

## 2022-08-17 PROCEDURE — 85730 THROMBOPLASTIN TIME PARTIAL: CPT | Mod: 91 | Performed by: STUDENT IN AN ORGANIZED HEALTH CARE EDUCATION/TRAINING PROGRAM

## 2022-08-17 PROCEDURE — 93750 INTERROGATION VAD IN PERSON: CPT | Mod: ,,, | Performed by: INTERNAL MEDICINE

## 2022-08-17 PROCEDURE — 83880 ASSAY OF NATRIURETIC PEPTIDE: CPT | Performed by: STUDENT IN AN ORGANIZED HEALTH CARE EDUCATION/TRAINING PROGRAM

## 2022-08-17 PROCEDURE — 25000242 PHARM REV CODE 250 ALT 637 W/ HCPCS: Performed by: THORACIC SURGERY (CARDIOTHORACIC VASCULAR SURGERY)

## 2022-08-17 PROCEDURE — 93750 PR INTERROGATE VENT ASSIST DEV, IN PERSON, W PHYSICIAN ANALYSIS: ICD-10-PCS | Mod: ,,, | Performed by: INTERNAL MEDICINE

## 2022-08-17 PROCEDURE — 85384 FIBRINOGEN ACTIVITY: CPT | Performed by: THORACIC SURGERY (CARDIOTHORACIC VASCULAR SURGERY)

## 2022-08-17 PROCEDURE — 94640 AIRWAY INHALATION TREATMENT: CPT

## 2022-08-17 PROCEDURE — 99233 PR SUBSEQUENT HOSPITAL CARE,LEVL III: ICD-10-PCS | Mod: ,,, | Performed by: INTERNAL MEDICINE

## 2022-08-17 PROCEDURE — 99232 SBSQ HOSP IP/OBS MODERATE 35: CPT | Mod: ,,, | Performed by: GENERAL ACUTE CARE HOSPITAL

## 2022-08-17 PROCEDURE — 94761 N-INVAS EAR/PLS OXIMETRY MLT: CPT

## 2022-08-17 PROCEDURE — 97535 SELF CARE MNGMENT TRAINING: CPT

## 2022-08-17 PROCEDURE — 92526 ORAL FUNCTION THERAPY: CPT

## 2022-08-17 RX ADMIN — ACETYLCYSTEINE 4 ML: 100 SOLUTION ORAL; RESPIRATORY (INHALATION) at 08:08

## 2022-08-17 RX ADMIN — PREDNISONE 20 MG: 20 TABLET ORAL at 08:08

## 2022-08-17 RX ADMIN — DOCUSATE SODIUM 100 MG: 50 LIQUID ORAL at 08:08

## 2022-08-17 RX ADMIN — HYDROXYZINE HYDROCHLORIDE 25 MG: 25 TABLET ORAL at 09:08

## 2022-08-17 RX ADMIN — MEXILETINE HYDROCHLORIDE 400 MG: 200 CAPSULE ORAL at 05:08

## 2022-08-17 RX ADMIN — ALPRAZOLAM 1 MG: 0.5 TABLET ORAL at 12:08

## 2022-08-17 RX ADMIN — LEVALBUTEROL HYDROCHLORIDE 0.63 MG: 0.63 SOLUTION RESPIRATORY (INHALATION) at 01:08

## 2022-08-17 RX ADMIN — LEVALBUTEROL HYDROCHLORIDE 0.63 MG: 0.63 SOLUTION RESPIRATORY (INHALATION) at 08:08

## 2022-08-17 RX ADMIN — PANTOPRAZOLE SODIUM 40 MG: 40 GRANULE, DELAYED RELEASE ORAL at 08:08

## 2022-08-17 RX ADMIN — MIRTAZAPINE 15 MG: 15 TABLET, FILM COATED ORAL at 09:08

## 2022-08-17 RX ADMIN — MEXILETINE HYDROCHLORIDE 400 MG: 200 CAPSULE ORAL at 03:08

## 2022-08-17 RX ADMIN — BUSPIRONE HYDROCHLORIDE 5 MG: 5 TABLET ORAL at 08:08

## 2022-08-17 RX ADMIN — ACETYLCYSTEINE 4 ML: 100 SOLUTION ORAL; RESPIRATORY (INHALATION) at 01:08

## 2022-08-17 RX ADMIN — ASPIRIN 81 MG CHEWABLE TABLET 81 MG: 81 TABLET CHEWABLE at 08:08

## 2022-08-17 RX ADMIN — Medication 400 MG: at 08:08

## 2022-08-17 RX ADMIN — METHIMAZOLE 5 MG: 5 TABLET ORAL at 08:08

## 2022-08-17 RX ADMIN — Medication 400 MG: at 09:08

## 2022-08-17 RX ADMIN — BUSPIRONE HYDROCHLORIDE 5 MG: 5 TABLET ORAL at 03:08

## 2022-08-17 RX ADMIN — HEPARIN SODIUM 23 UNITS/KG/HR: 5000 INJECTION INTRAVENOUS; SUBCUTANEOUS at 11:08

## 2022-08-17 RX ADMIN — AMIODARONE HYDROCHLORIDE 400 MG: 200 TABLET ORAL at 08:08

## 2022-08-17 RX ADMIN — POLYETHYLENE GLYCOL 3350 17 G: 17 POWDER, FOR SOLUTION ORAL at 08:08

## 2022-08-17 RX ADMIN — Medication 6 MG: at 09:08

## 2022-08-17 RX ADMIN — BUSPIRONE HYDROCHLORIDE 5 MG: 5 TABLET ORAL at 09:08

## 2022-08-17 RX ADMIN — MEXILETINE HYDROCHLORIDE 400 MG: 200 CAPSULE ORAL at 09:08

## 2022-08-17 NOTE — PT/OT/SLP PROGRESS
Physical Therapy Co-Treatment  Co-treatment performed due to acuity and complexity of pt's medical status with 2 skilled disciplines needed to optimize pts functional performance in ICU setting.    Patient Name:  Tim Richards   MRN:  [id number]    Recommendations:     Discharge Recommendations:  rehabilitation facility   Discharge Equipment Recommendations:  (TBD by next level of care)   Barriers to discharge: Inaccessible home and Decreased caregiver support    Assessment:     Tim Richards is a 56 y.o. male admitted with a medical diagnosis of Left ventricular assist device (LVAD) complication.  He presents with the following impairments/functional limitations:  weakness, impaired endurance, impaired self care skills, impaired functional mobility, gait instability, impaired balance, decreased upper extremity function, decreased lower extremity function, impaired cardiopulmonary response to activity, orthopedic precautions. Pt progressed to a squat pivot transfer to a cardiac chair. Pt still requires bed height elevated for improved STS.     Pt would benefit from intensive inpatient rehabilitation for: Dynamic/static standing/sitting balance through skilled balance training, strengthening with the use of skilled therapeutic exercises interventions, mobility and safety training to ensure safe discharge home through skilled patient and caregiver education home management training, mobility through community re-integration training and mobility through adaptive equipment training. Pt highly motivated to return to independent PLOF and can tolerate 3+hours of therapy. Pt continues to benefit from a collaborative PT/OT/SLP program to improve quality of life and focus on recovery of impairments.      Rehab Prognosis: Good; patient would benefit from acute skilled PT services to address these deficits and reach maximum level of function.    Recent Surgery: Procedure(s) (LRB):  CREATION, TRACHEOSTOMY  (N/A)  INSERTION, CENTRAL VENOUS ACCESS DEVICE 12 Days Post-Op    Plan:     During this hospitalization, patient to be seen 5 x/week to address the identified rehab impairments via gait training, therapeutic activities, therapeutic exercises, neuromuscular re-education and progress toward the following goals:    · Plan of Care Expires:  08/20/22    Subjective     Chief Complaint: none verbalized  Patient/Family Comments/goals: pt reported he slept better last night  Pain/Comfort:  · Pain Rating 1: 0/10  · Pain Rating Post-Intervention 1: 0/10      Objective:     Communicated with RN prior to session.  Patient found HOB elevated with arterial line, blood pressure cuff, NG tube, LVAD, oxygen, peripheral IV, telemetry, Tracheostomy upon PT entry to room.     General Precautions: Standard, fall, LVAD, sternal   Orthopedic Precautions:N/A   Braces: N/A     Functional Mobility:  · Bed Mobility:     · Scooting anteriorly: contact guard assistance  · Scooting posteriorly: SBA  · Supine to Sit: contact guard assistance  · Transfers:     · Sit to Stand:  maximal assistance and of 2 persons with no AD and hand-held assist, pt unable to maintain upright  · Bed to Chair: maximal assistance and of 2 persons with  no AD and hand-held assist  using  Squat Pivot  · Gait: N/T this date  · Balance:   · Static Sitting: SBA  · Dynamic Sitting: SBA  · Static Standing: N/T  · Dynamic Standing: N/T      AM-PAC 6 CLICK MOBILITY  Turning over in bed (including adjusting bedclothes, sheets and blankets)?: 3  Sitting down on and standing up from a chair with arms (e.g., wheelchair, bedside commode, etc.): 2  Moving from lying on back to sitting on the side of the bed?: 3  Moving to and from a bed to a chair (including a wheelchair)?: 2  Need to walk in hospital room?: 1  Climbing 3-5 steps with a railing?: 1  Basic Mobility Total Score: 12       Therapeutic Activities and Exercises:  Patient educated on role of therapy, goals of session, and  benefits of mobilizing.   Discussed PT plan of care during hospitalization.   Patient educated on calling for assistance.   Patient educated on how their diagnosis impacts their mobility within PT scope of practice.   Communication board up to date.  All questions answered within PT scope of practice.    Patient left up in medi-chair with all lines intact, call button in reach and RN present.    GOALS:   Multidisciplinary Problems     Physical Therapy Goals        Problem: Physical Therapy    Goal Priority Disciplines Outcome Goal Variances Interventions   Physical Therapy Goal     PT, PT/OT Ongoing, Progressing     Description: Goals to be met by: 22    Patient will increase functional independence with mobility by performin. Supine to sit with MInimal Assistance -not met  2. Sit to stand transfer with Contact Guard Assistance -not met  3. Gait  x 150 feet with Contact Guard Assistance with approved assistive device -not met  4. Ascend/descend 8 stair with right Handrails Contact Guard Assistance -not met  5. Sitting at edge of bed x15 minutes with Contact Guard Assistance to improve tolerance to activities. -not met  6. Lower extremity exercise program x15 reps with assistance as needed -not met               Multidisciplinary Problems (Resolved)        Problem: Physical Therapy    Goal Priority Disciplines Outcome Goal Variances Interventions   Physical Therapy Goal   (Resolved)     PT, PT/OT Met     Description: The patient is near his functional baseline, he ambulated within the room with no AD and independence. Unable to do stair training, assess gait endurance due to COVID restrictions. He is safe to return home with no therapy needs. He is in agreement with PT discharge, he states he is confident he can ascend/descend his stairs.           Problem: Physical Therapy    Goal Priority Disciplines Outcome Goal Variances Interventions   Physical Therapy Goal   (Resolved)     PT, PT/OT Met                      Time Tracking:     PT Received On: 08/16/22  PT Start Time: 1126     PT Stop Time: 1157  PT Total Time (min): 31 min     Billable Minutes: Therapeutic Activity 10 and Therapeutic Exercise 13       PT/PTA: PT     PTA Visit Number: 0     08/16/2022

## 2022-08-17 NOTE — PT/OT/SLP PROGRESS
Speech Language Pathology Treatment    Patient Name:  Tim Richards   MRN:  [id number]  Admitting Diagnosis: Left ventricular assist device (LVAD) complication    Recommendations:                 General Recommendations:  Dysphagia therapy  Diet recommendations:  NPO, Liquid Diet Level: Nectar Thick  (for pleasure)   Ok for ice chips for the purpose for completing swallowing exercises   2-3 x daily complete the following   1. Effortful swallow x10  2. Falsetto /i/ x10  3. Ro Maneuver x3  General Precautions: Standard, fall, LVAD, sternal  Communication strategies:  One way speaking valve    Subjective     Awake/alert    Pain/Comfort:  · Pain Rating 1: 0/10  · Pain Rating Post-Intervention 1: 0/10    Respiratory Status: trach collar     Objective:     Has the patient been evaluated by SLP for swallowing?   Yes  Keep patient NPO? No     Pt upright in cardiac chair upon SLP entry. He tolerated nectar thick liquids x4 oz while completing ro maneuver, effortful swallow and falsetto completed x5-10 reps with min cues. Pt wore PMSV throughout conversation with no s/s of respiratory distress. SLP and pt discussed POC, MBSS results, risk of aspiration and dysphagia. He verbalized understanding of information. SLP will follow up.    Assessment:     Tim Richards is a 56 y.o. male with an SLP diagnosis of Dysphagia.      Goals:   Multidisciplinary Problems     SLP Goals        Problem: SLP    Goal Priority Disciplines Outcome   SLP Goal     SLP    Description: Speech Language Pathology Goals  Goals expected to be met by 8/18    1.Pt will participate in more objective swallow assessment via MBSS to help determine least restrictive diet   2. . Pt will tolerate PMSV for waking hours to increase phonation, respiration and swallowing aspects  3. Pt/family will don/doff PMSV x1 during session with min cues                       Plan:     · Patient to be seen:  4 x/week   · Plan of Care reviewed with:  patient   · SLP  Follow-Up:  Yes       Discharge recommendations:  rehabilitation facility     Time Tracking:     SLP Treatment Date:   08/17/22  Speech Start Time:  0824  Speech Stop Time:  0840     Speech Total Time (min):  16 min    Billable Minutes: Treatment Swallowing Dysfunction 8 and Self Care/Home Management Training 8    08/17/2022

## 2022-08-17 NOTE — OP NOTE
DATE: 08/4/2022.    PREOPERATIVE DIAGNOSES:   1. Heart failure.  2. Respiratory failure.     POSTOPERATIVE DIAGNOSES:   1. Heart failure.  2. Respiratory failure.     PROCEDURES PERFORMED:   1. Percutaneous tracheostomy.   2. Bronchoscopy through tracheostomy.    ATTENDING SURGEON: Germain Holt MD .    RESIDENT: Hardeep Biswas MD.    ANESTHESIA: General.     INDICATIONS: Tim Richards is a 56 y.o.male admitted to [facility] with heart failure. The patient has failed attempts to wean from the ventilator. We have recommended to the family that the patient would benefit from placement of a percutaneous tracheostomy tube for the anticipated prolonged need for mechanical ventilation. We also recommend a percutaneous gastrostomy tube for the patient's dysphagia. We did obtain informed consent from the patient's family who expressed understanding of the risks and benefits and gave consent to proceed.     PROCEDURE: The patient was taken to the operating room and placed supine. After induction of general endotracheal tube anesthesia, the neck was extended, padded, and was prepped and draped in the standard fashion. Timeout was performed. Tracheal landmarks were identified and a two centimeter midline cervical incision was made. Subcutaneous tissue was bluntly dissected, and appropriate position for the tracheostomy tube was noted. Under bronchoscopic guidance, the endotracheal tube was withdrawn to above the level of tracheostomy. The trachea was cannulated with a hollow bore needle. Aspiration of air confirmed cannulation of the trachea. The guidewire was placed and confirmed within the trachea by bronchoscopy. Under direct visualization, a tracheostomy was created by serial dilation; first with the punch dilator and then the Blue Rhino dilator. A cuffed #8 Shiley tracheostomy was placed into the trachea by Seldinger technique. The cuff of the tracheostomy tube was inflated and Bronchoscopy through the  tracheostomy confirmed appropriate position with visualization of the tyson. The endotracheal tube was removed and the tracheostomy tube was secured in place using Velcro tracheostomy tape and 2-0 Prolene sutures. The patient was then transferred to the ICU in critical, but stable condition. All needle ans sponge counts were correct at the conclusion of the case. I was present for the procedure in its entirety.    ESTIMATED BLOOD LOSS: Minimal     FINDINGS: Tracheostomy placed without apparent complication.

## 2022-08-17 NOTE — NURSING
Pt sleeping, no family at bedside. Pt worked with therapy today. Will work on getting sunshine therapy when stepped down to CSU. VAD parameters WNL.

## 2022-08-17 NOTE — PROGRESS NOTES
[facility]  Heart Failure  Progress Note     Hospital Length of Stay: 51  Interval History:  - No acute event overnight.   - Continue tube feeding.   - Working with PT/OT/SLP.     Vitals:  Temp:  [97.5 °F (36.4 °C)-98.9 °F (37.2 °C)] 97.6 °F (36.4 °C)  Pulse:  [70-81] 75  Resp:  [17-37] 19  SpO2:  [95 %-99 %] 95 %  BP: (70-80)/(0) 72/0  Arterial Line BP: ()/(54-90) 83/72    Intake/Output    Intake/Output Summary (Last 24 hours) at 8/17/2022 1045  Last data filed at 8/17/2022 0900  Gross per 24 hour   Intake 1539.71 ml   Output 1150 ml   Net 389.71 ml     Physical Exam  Gen: Trach in place. No evidence of bleeding. Alert and awake.   HEENT: Pupils equal and reactive to light.   Cardio: Regular rate, VAD hum obstructing heart sounds  Resp: No additional sound heard.   Abd: Soft, non-tender, non-distended  Skin: Warm,  trace peripheral edema  Neuro: No gross abnormalities.   Psych: Normal mood and affect.      Labs:  Recent Labs   Lab 08/16/22  1154 08/16/22 2007 08/17/22  0343   WBC 20.82* 17.68* 18.66*   HGB 8.5* 8.7* 8.4*   HCT 29.9* 31.3* 31.3*    161 167     Recent Labs   Lab 08/16/22  1154 08/16/22  1417 08/16/22 2007 08/16/22  2335 08/17/22  0343 08/17/22  0757      < > 142 143 141 141   K 5.6*  5.6*   < > 5.0  5.0 4.6 4.3 4.0  4.0   *   < > 116* 116* 116* 115*   CO2 16*   < > 19* 19* 17* 19*   BUN 44*   < > 45* 43* 45* 46*   CREATININE 1.3   < > 1.4 1.1 1.1 1.2   *   < > 146* 138* 121* 136*   CALCIUM 9.0   < > 9.7 9.6 9.5 9.5   MG 2.7*  --  2.8*  --  2.8*  --    PHOS 2.5*  --  2.6*  --  2.4*  --     < > = values in this interval not displayed.       Micro:    Current Meds:   acetylcysteine 100 mg/ml (10%)  4 mL Nebulization Q6H    amiodarone  400 mg Oral Daily    aspirin  81 mg Per OG tube Daily    busPIRone  5 mg Per NG tube TID    calcium gluconate IVPB  1 g Intravenous Once    dextrose 10 % in water (D10W)  25 g Intravenous Once    docusate  100  mg Per NG tube Daily    levalbuterol  0.63 mg Nebulization Q6H    magnesium oxide  400 mg Oral BID    methIMAzole  5 mg Oral Daily    mexiletine  400 mg Oral Q8H    mirtazapine  15 mg Oral QHS    ondansetron  4 mg Intravenous Once    pantoprazole  40 mg Per NG tube Daily    polyethylene glycol  17 g Per NG tube Daily    predniSONE  20 mg Per NG tube Daily       Continuous Infusions:   dextrose 10 % in water (D10W)      heparin (porcine) in D5W 23 Units/kg/hr (08/17/22 0900)     Assessment and Plan:  Cardiogenic Shock  -Previous HM2, underwent pump exchange to HM3 on 7/13/22 complicated by worsening CS/RV failure requiring VA ECMO now de cannulated.  -Currently trach ed. Chest tube removal, bronch, and old driveline removed 7/22.  -Re-intubated on 7/29/22 due to hypercapnic resp failure. Trach placed on 8/4.   -Doing well on Trach collar.   -Off diuretics currently. Creatinine has trended down to baseline.   -Plan to transfer him out to step down unit today.     Trach site bleeding: Resolved.   - Continue minimal dosing heparin.   - Surgical team placed a suture on 8/11.       LVAD:  - No significant events.   - Functioning well.     Fever: more than a week ago.   Leucocytosis trending down albeit slowly.   Completed one week of meropenem on 8/16. Prior to that completed course of cefepime and flagyl.   No growth in cultures yet.      History of ventricular tachycardia/Episode of A flutter 2:1 block:  Patient experienced an episode of VT on 6/30 and experienced an ICD shock thought to be related to hypokalemia (K of 3.1 on 6/30)  Aggressively replete K, keep K>4 and Mg>2  Continue mexiletine PO.  Amio gtt transitioned to PO.     COVID-19 virus infection:  -Previously hypoxic then recovered  -ID was consulted, recommended Remdesivir, course completed     Elevated serum lactate dehydrogenase (LDH):  S/p HM3 exchange for HM2 pump thrombosis  Continue to trend LDH.      Amiodarone induced hypothyrodism  initially, now hyperthyroidism:  -Endocrine team on board.  -On prednisone and methimazole.   - Prednisone 40 mg.   -Repeat T4 tomorrow.   - Medications has been changes as per Endocrinology team.     Scot Card MD, FACP  Cardiovascular Disease Fellow PGY4  [facility]

## 2022-08-17 NOTE — PROGRESS NOTES
[facility]  Endocrinology  Progress Note    Admit Date: 6/27/2022     56 year-old  male with NICM with LVAD, s/p ICD for VT on amiodarone and mexiletine and AIT followed by hypothyroidism initially admitted 6/27/2022 with COVID respiratory failure complicated with LVAD pump thrombosis that was refractory to medical therapy. Underwent LVAD pump exchange. Post-op course complicated by worsening cardiogenic shock/RV failure requiring VA-ECMO. Improved clinically underwent successful decannulation. Continued episodes of VT with ICD shock 7/21 and ongoing anti-arrhythmic mediation adjustments. TFTs on 7/27 significant for recurrent hyperthyroidism with undetectable TSH and elevated fT4.    - Patient re-intubated 07/29/2022    - Endocrinology consulted for recurrent AIT    Regarding AIT:    - Last seen outpatient by Dr. 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 He required a prolonged course of prednisone and methimazole, then subsequently developed hypothyroidism in May 2020. LT4 was adjusted based on serial TFT measurements. Most recently levothyroxine dose has been 112 mcg     - He self-decreased his amiodarone dose to 200 mg daily about 6 months ago. T4 was high on 7/13, but he was continued on levothyroxine 112 mcg    Lab Results   Component Value Date    TSH <0.010 (L) 07/27/2022    TSH 0.111 (L) 07/13/2022    TSH 4.079 (H) 03/17/2022    FREET4 2.51 (H) 08/07/2022    FREET4 2.95 (H) 08/06/2022    FREET4 2.18 (H) 08/05/2022      Latest Reference Range & Units 08/07/22 03:42 08/08/22 03:10 08/09/22 03:29 08/10/22 03:33   Free T4 0.71 - 1.51 ng/dL 2.51 (H) 2.43 (H) 2.23 (H) 2.12 (H)       Interval HPI:   Overnight events: No acute events o/n. On methimazole 5 mg daily and pred 20 mg daily. Plan to keep him on these doses until eval outpatient after dc.  Eating:   NPO  Nausea: No  Hypoglycemia and  "intervention: No  Fever: No  TPN and/or TF: Yes  If yes, type of TF/TPN and rate: Novasource 45 mL/hr    BP (!) 72/0 (BP Location: Left arm, Patient Position: Lying)   Pulse 76   Temp 97.6 °F (36.4 °C) (Oral)   Resp 18   Ht 6' 1" (1.854 m)   Wt 85.3 kg (188 lb)   SpO2 95%   BMI 24.80 kg/m²     Labs Reviewed and Include    Recent Labs   Lab 08/17/22  0343 08/17/22  0757   * 136*   CALCIUM 9.5 9.5   ALBUMIN 2.3*  --    PROT 6.3  --     141   K 4.3 4.0  4.0   CO2 17* 19*   * 115*   BUN 45* 46*   CREATININE 1.1 1.2   ALKPHOS 260*  --    ALT 75*  --    AST 56*  --    BILITOT 2.2*  --      Lab Results   Component Value Date    WBC 18.66 (H) 08/17/2022    HGB 8.4 (L) 08/17/2022    HCT 31.3 (L) 08/17/2022     (H) 08/17/2022     08/17/2022     Recent Labs   Lab 08/14/22  1936 08/15/22  0346 08/16/22  0342   TSH 0.127*  --   --    FREET4 1.39 1.46 1.24     Lab Results   Component Value Date    HGBA1C 5.4 04/26/2021       Nutritional status:   Body mass index is 24.8 kg/m².  Lab Results   Component Value Date    ALBUMIN 2.3 (L) 08/17/2022    ALBUMIN 2.2 (L) 08/16/2022    ALBUMIN 2.2 (L) 08/16/2022     Lab Results   Component Value Date    PREALBUMIN 32 08/11/2022    PREALBUMIN 28 08/05/2022    PREALBUMIN 13 (L) 07/29/2022       Estimated Creatinine Clearance: 78.6 mL/min (based on SCr of 1.2 mg/dL).    Accu-Checks  Recent Labs     08/16/22  0728 08/16/22  0815 08/16/22  1008 08/16/22  1154 08/16/22  1418 08/16/22  1613 08/16/22  2006 08/16/22  2335 08/17/22  0342 08/17/22  0757   POCTGLUCOSE 81 79 113* 151* 168* 151* 155* 148* 125* 148*       Current Medications and/or Treatments Impacting Glycemic Control  Immunotherapy:    Immunosuppressants       None          Steroids:   Hormones (From admission, onward)                Start     Stop Route Frequency Ordered    08/17/22 0900  predniSONE tablet 20 mg         -- PER NG TUBE Daily 08/16/22 1000    08/02/22 1931  melatonin tablet 6 mg    "      -- OG Nightly PRN 08/02/22 1832          Pressors:    Autonomic Drugs (From admission, onward)                Start     Stop Route Frequency Ordered    07/29/22 0011  EPINEPHrine (ADRENALIN) 1 mg/mL injection        Note to Pharmacy: Created by cabinet override    07/29 1214   07/29/22 0011          Hyperglycemia/Diabetes Medications:   Antihyperglycemics (From admission, onward)                Start     Stop Route Frequency Ordered    07/30/22 1023  insulin aspart U-100 pen 0-5 Units         -- SubQ Every 4 hours PRN 07/30/22 0925            ASSESSMENT and PLAN    Amiodarone-induced hyperthyroidism  Key History and Diagnostic Findings  - History of AIT type 2 (TRAb and TSI negative; Thyroid US unremarkable with low vascularity)  - Weaned off methimazole and prednisone by May 2020, then developed hypothyroidism, Levothyroxine started and dose titrated per TFTs. Prior to current admission he was on Levothyroxine 112 mcg daily  - Labs consistent with hypothyroidism until current admission, initially on 7/13, with low TSH and elevated fT4. Repeat TFTs on 7/27 with worsening hyperthyroidism. He continued to receive LT4 112 mcg through 7/28. He remains on amiodarone for episodes of Ventricular Tachycardia  - Suspect current hyperthyroidism is likely due to AIT-2, however continued exogenous LT4 certainly contribute to current presentation   - fT4 now normal    Lab Results   Component Value Date    TSH 0.127 (L) 08/14/2022    TSH <0.010 (L) 07/27/2022    TSH 0.111 (L) 07/13/2022    FREET4 1.24 08/16/2022    FREET4 1.46 08/15/2022    FREET4 1.39 08/14/2022     Plan  - Strongly suspect AIT type 2 with improvement seen currently with the use of prednisone.   - Heparin can cause a false elevation in free T4 as it displaces free T4 from TBG; will follow-up direct dialysis result collected 8.5.22 -- lab was cancelled by receiving lab as sample insufficient to perform lab   - Will continue methimazole 5mg daily and  prednisone 20 mg daily and re-evaluate outpatient  - Decrease prednisone to 20 mg qd on 8/17 - will continue this dose at d/c and re-evaluate outpatient   - Stop daily fT4, will check fT4 again on 8/19/2022    amiodarone induced hypothyrodism  - Levothyroxine discontinued on 7/28/2022  - Please see plan as above    VT (ventricular tachycardia)  Recurrent VT requiring multiple antiarrhythmics, including continued, long-term need for amiodarone       Hyperglycemia  Key History and Diagnostic Findings  - Hyperglycemia noted once starting TPN in addition to steroids  - No prior history of diabetes; most recent A1c of 5.4 on 04/26/2021  - Blood sugars tight in last 24 hours --even after stopping scheduled levemir and reducing scheduled aspart   - With decreasing steroids anticipate decreased insulin requirement  - Minimal insulin req in last 24h    Plan  - Continue LDC only  - Please notify endocrine if any change in tube feeds as this will change insulin requirements.  - Please hold insulin if tube feeds are held for some reason  - Please ensure that accuchecks are being documented every 4 hours    LVAD (left ventricular assist device) present  LVAD in place, continues to have runs of VT on multiple antiarrhythmics. Also cardioverted from AFL to SR on 7/30  Management by primary          María Brice MD  Endocrinology  [facility]

## 2022-08-17 NOTE — ASSESSMENT & PLAN NOTE
Key History and Diagnostic Findings  - Hyperglycemia noted once starting TPN in addition to steroids  - No prior history of diabetes; most recent A1c of 5.4 on 04/26/2021  - Blood sugars tight in last 24 hours --even after stopping scheduled levemir and reducing scheduled aspart   - With decreasing steroids anticipate decreased insulin requirement  - Minimal insulin req in last 24h    Plan  - Continue LDC only  - Please notify endocrine if any change in tube feeds as this will change insulin requirements.  - Please hold insulin if tube feeds are held for some reason  - Please ensure that accuchecks are being documented every 4 hours

## 2022-08-17 NOTE — PROGRESS NOTES
08/17/22 1720   Vital Signs   Temp 98.6 °F (37 °C)   Temp src Oral   Pulse 76   Heart Rate Source Monitor   Resp (!) 22   SpO2 96 %   Pulse Oximetry Type Intermittent   BP (!) 64/0   BP Location Left arm   BP Method Doppler   Patient Position Lying        Cardiac/Telemetry Details / Alarms   Cardiac/Telemetry Monitor On No   Cardiac/Telemetry Audible No   Cardiac/Telemetry Alarms Set No   Assessments (Pre/Post)   Level of Consciousness (AVPU) alert   Patient admitted to CSU. Patient arrived to floor from CVICU no evidence of distress; patient AAO x4 at this time. Patient placed on tele. Vital signs obtained. Patient voices no complaints at this time. Plan of care initiated with patient. Bed in lowest position, locked, SR up x2, call bell in reach. Will continue to monitor patient.

## 2022-08-17 NOTE — NURSING TRANSFER
Nursing Transfer Note      8/17/2022     Reason patient is being transferred: Stepdown    Transfer To:     Transfer via bed    Transfer with cardiac monitoring    Transported by RN and PCT    Medicines sent: yes    Any special needs or follow-up needed: n/a    Chart send with patient: Yes    Notified: spouse    Patient reassessed at: 8/17/22 @5819 (date, time)    Upon arrival to floor: cardiac monitor applied, patient oriented to room, call bell in reach and bed in lowest position. Bedside report given to EMILY Vazquez. LVAD connected back to [employer]

## 2022-08-17 NOTE — CONSULTS
NAOMY placed 20g, 8 cm Midline in right brachial vein using u/s guidance.  Max dwell date 9/14/2022.  Lot # [id number].    MIDLINE inserted for long term PVA

## 2022-08-17 NOTE — PROGRESS NOTES
08/17/2022  Carissa Dean    Current provider:  Amy Rhodes MD    Device interrogation:  TXP LVAD INTERROGATIONS 8/17/2022 8/17/2022 8/17/2022 8/17/2022 8/17/2022 8/17/2022 8/17/2022   Type HeartMate3 HeartMate3 HeartMate3 HeartMate3 HeartMate3 HeartMate3 HeartMate3   Flow 5.7 5.7 5.8 5.6 5.5 5.7 5.6   Speed 6300 6300 6300 6300 6300 6300 6300   PI 1.5 1.5 1.4 2.8 3 1.8 2   Power (Jackson) 5.4 5.3 5.4 5.5 5.3 5.4 5.4   LSL 5900 5900 5900 5900 5900 5900 5900   Low Flow Alarm - - - - - - -   Pulsatility Intermittent pulse Intermittent pulse Intermittent pulse Intermittent pulse Intermittent pulse Intermittent pulse Intermittent pulse          Rounded on Tim Richards to ensure all mechanical assist device settings (IABP or VAD) were appropriate and all parameters were within limits.  I was able to ensure all back up equipment was present, the staff had no issues, and the Perfusion Department daily rounding was complete.      For implantable VADs: Interrogation of Ventricular assist device was performed with analysis of device parameters and review of device function. I have personally reviewed the interrogation findings and agree with findings as stated.     In emergency, the nursing units have been notified to contact the perfusion department either by:  Calling [id number] from 630am to 4pm Mon thru Fri, utilizing the On-Call Finder functionality of Epic and searching for Perfusion, or by contacting the hospital  from 4pm to 630am and on weekends and asking to speak with the perfusionist on call.    8:34 AM

## 2022-08-17 NOTE — SUBJECTIVE & OBJECTIVE
"Interval HPI:   Overnight events: No acute events o/n. On methimazole 5 mg daily and pred 20 mg daily. Plan to keep him on these doses until eval outpatient after dc.  Eating:   NPO  Nausea: No  Hypoglycemia and intervention: No  Fever: No  TPN and/or TF: Yes  If yes, type of TF/TPN and rate: Novasource 45 mL/hr    BP (!) 72/0 (BP Location: Left arm, Patient Position: Lying)   Pulse 76   Temp 97.6 °F (36.4 °C) (Oral)   Resp 18   Ht 6' 1" (1.854 m)   Wt 85.3 kg (188 lb)   SpO2 95%   BMI 24.80 kg/m²     Labs Reviewed and Include    Recent Labs   Lab 08/17/22  0343 08/17/22  0757   * 136*   CALCIUM 9.5 9.5   ALBUMIN 2.3*  --    PROT 6.3  --     141   K 4.3 4.0  4.0   CO2 17* 19*   * 115*   BUN 45* 46*   CREATININE 1.1 1.2   ALKPHOS 260*  --    ALT 75*  --    AST 56*  --    BILITOT 2.2*  --      Lab Results   Component Value Date    WBC 18.66 (H) 08/17/2022    HGB 8.4 (L) 08/17/2022    HCT 31.3 (L) 08/17/2022     (H) 08/17/2022     08/17/2022     Recent Labs   Lab 08/14/22  1936 08/15/22  0346 08/16/22  0342   TSH 0.127*  --   --    FREET4 1.39 1.46 1.24     Lab Results   Component Value Date    HGBA1C 5.4 04/26/2021       Nutritional status:   Body mass index is 24.8 kg/m².  Lab Results   Component Value Date    ALBUMIN 2.3 (L) 08/17/2022    ALBUMIN 2.2 (L) 08/16/2022    ALBUMIN 2.2 (L) 08/16/2022     Lab Results   Component Value Date    PREALBUMIN 32 08/11/2022    PREALBUMIN 28 08/05/2022    PREALBUMIN 13 (L) 07/29/2022       Estimated Creatinine Clearance: 78.6 mL/min (based on SCr of 1.2 mg/dL).    Accu-Checks  Recent Labs     08/16/22  0728 08/16/22  0815 08/16/22  1008 08/16/22  1154 08/16/22  1418 08/16/22  1613 08/16/22  2006 08/16/22  2335 08/17/22  0342 08/17/22  0757   POCTGLUCOSE 81 79 113* 151* 168* 151* 155* 148* 125* 148*       Current Medications and/or Treatments Impacting Glycemic Control  Immunotherapy:    Immunosuppressants       None          Steroids: "   Hormones (From admission, onward)                Start     Stop Route Frequency Ordered    08/17/22 0900  predniSONE tablet 20 mg         -- PER NG TUBE Daily 08/16/22 1000    08/02/22 1931  melatonin tablet 6 mg         -- OG Nightly PRN 08/02/22 1832          Pressors:    Autonomic Drugs (From admission, onward)                Start     Stop Route Frequency Ordered    07/29/22 0011  EPINEPHrine (ADRENALIN) 1 mg/mL injection        Note to Pharmacy: Created by cabinet override    07/29 1214   07/29/22 0011          Hyperglycemia/Diabetes Medications:   Antihyperglycemics (From admission, onward)                Start     Stop Route Frequency Ordered    07/30/22 1023  insulin aspart U-100 pen 0-5 Units         -- SubQ Every 4 hours PRN 07/30/22 0925

## 2022-08-17 NOTE — PLAN OF CARE
SICU PLAN OF CARE NOTE     Dx: Left ventricular assist device (LVAD) complication     Shift Events: Midline obtained. Up to cardiac chair this AM. No acute events.     Goals of Care: MAP 65-85, K > 4.5, Mg > 2.5     Neuro: AAO x4, Follows Commands, and Moves All Extremities     Respiratory: 5L/28% TC     Diet: Tube Feeds @ 45 ml/hr (minimal residuals)      Gtts: Heparin     Urine Output: Voids Spontaneously, total in flowsheets     VAD speed 6300 rpm           flow: 5.6-5.7     Labs/Accuchecks: Chem/CBC Q8hr. Accuchecks Q4hr     Skin: No new skin breakdown noted. Turned Q2h and shifting weight independently. Sacral foam in place.

## 2022-08-18 LAB
ALBUMIN SERPL BCP-MCNC: 2.3 G/DL (ref 3.5–5.2)
ALBUMIN SERPL BCP-MCNC: 2.3 G/DL (ref 3.5–5.2)
ALP SERPL-CCNC: 292 U/L (ref 55–135)
ALP SERPL-CCNC: 311 U/L (ref 55–135)
ALT SERPL W/O P-5'-P-CCNC: 76 U/L (ref 10–44)
ALT SERPL W/O P-5'-P-CCNC: 77 U/L (ref 10–44)
ANION GAP SERPL CALC-SCNC: 8 MMOL/L (ref 8–16)
APTT BLDCRRT: 45 SEC (ref 21–32)
APTT BLDCRRT: 53.5 SEC (ref 21–32)
AST SERPL-CCNC: 56 U/L (ref 10–40)
AST SERPL-CCNC: 60 U/L (ref 10–40)
BASOPHILS # BLD AUTO: 0.02 K/UL (ref 0–0.2)
BASOPHILS # BLD AUTO: 0.02 K/UL (ref 0–0.2)
BASOPHILS # BLD AUTO: 0.03 K/UL (ref 0–0.2)
BASOPHILS NFR BLD: 0.1 % (ref 0–1.9)
BASOPHILS NFR BLD: 0.1 % (ref 0–1.9)
BASOPHILS NFR BLD: 0.2 % (ref 0–1.9)
BILIRUB DIRECT SERPL-MCNC: 1.4 MG/DL (ref 0.1–0.3)
BILIRUB SERPL-MCNC: 2 MG/DL (ref 0.1–1)
BILIRUB SERPL-MCNC: 2 MG/DL (ref 0.1–1)
BUN SERPL-MCNC: 47 MG/DL (ref 6–20)
CALCIUM SERPL-MCNC: 9.3 MG/DL (ref 8.7–10.5)
CHLORIDE SERPL-SCNC: 111 MMOL/L (ref 95–110)
CO2 SERPL-SCNC: 17 MMOL/L (ref 23–29)
CREAT SERPL-MCNC: 1.3 MG/DL (ref 0.5–1.4)
DIFFERENTIAL METHOD: ABNORMAL
EOSINOPHIL # BLD AUTO: 0 K/UL (ref 0–0.5)
EOSINOPHIL # BLD AUTO: 0.1 K/UL (ref 0–0.5)
EOSINOPHIL # BLD AUTO: 0.1 K/UL (ref 0–0.5)
EOSINOPHIL NFR BLD: 0.1 % (ref 0–8)
EOSINOPHIL NFR BLD: 0.3 % (ref 0–8)
EOSINOPHIL NFR BLD: 0.4 % (ref 0–8)
ERYTHROCYTE [DISTWIDTH] IN BLOOD BY AUTOMATED COUNT: 20 % (ref 11.5–14.5)
ERYTHROCYTE [DISTWIDTH] IN BLOOD BY AUTOMATED COUNT: 20.1 % (ref 11.5–14.5)
ERYTHROCYTE [DISTWIDTH] IN BLOOD BY AUTOMATED COUNT: 20.2 % (ref 11.5–14.5)
EST. GFR  (NO RACE VARIABLE): >60 ML/MIN/1.73 M^2
GLUCOSE SERPL-MCNC: 138 MG/DL (ref 70–110)
HCT VFR BLD AUTO: 30.1 % (ref 40–54)
HCT VFR BLD AUTO: 31.5 % (ref 40–54)
HCT VFR BLD AUTO: 33.5 % (ref 40–54)
HGB BLD-MCNC: 8.5 G/DL (ref 14–18)
HGB BLD-MCNC: 8.8 G/DL (ref 14–18)
HGB BLD-MCNC: 9.1 G/DL (ref 14–18)
IMM GRANULOCYTES # BLD AUTO: 0.32 K/UL (ref 0–0.04)
IMM GRANULOCYTES # BLD AUTO: 0.34 K/UL (ref 0–0.04)
IMM GRANULOCYTES # BLD AUTO: 0.35 K/UL (ref 0–0.04)
IMM GRANULOCYTES NFR BLD AUTO: 1.9 % (ref 0–0.5)
IMM GRANULOCYTES NFR BLD AUTO: 2 % (ref 0–0.5)
IMM GRANULOCYTES NFR BLD AUTO: 2.4 % (ref 0–0.5)
INR PPP: 1 (ref 0.8–1.2)
LYMPHOCYTES # BLD AUTO: 0.4 K/UL (ref 1–4.8)
LYMPHOCYTES # BLD AUTO: 0.6 K/UL (ref 1–4.8)
LYMPHOCYTES # BLD AUTO: 0.7 K/UL (ref 1–4.8)
LYMPHOCYTES NFR BLD: 2.3 % (ref 18–48)
LYMPHOCYTES NFR BLD: 3.9 % (ref 18–48)
LYMPHOCYTES NFR BLD: 3.9 % (ref 18–48)
MAGNESIUM SERPL-MCNC: 2.5 MG/DL (ref 1.6–2.6)
MAGNESIUM SERPL-MCNC: 2.7 MG/DL (ref 1.6–2.6)
MAGNESIUM SERPL-MCNC: 2.7 MG/DL (ref 1.6–2.6)
MCH RBC QN AUTO: 27.8 PG (ref 27–31)
MCH RBC QN AUTO: 28.7 PG (ref 27–31)
MCH RBC QN AUTO: 28.8 PG (ref 27–31)
MCHC RBC AUTO-ENTMCNC: 27.2 G/DL (ref 32–36)
MCHC RBC AUTO-ENTMCNC: 27.9 G/DL (ref 32–36)
MCHC RBC AUTO-ENTMCNC: 28.2 G/DL (ref 32–36)
MCV RBC AUTO: 102 FL (ref 82–98)
MCV RBC AUTO: 102 FL (ref 82–98)
MCV RBC AUTO: 103 FL (ref 82–98)
MONOCYTES # BLD AUTO: 0.6 K/UL (ref 0.3–1)
MONOCYTES # BLD AUTO: 0.6 K/UL (ref 0.3–1)
MONOCYTES # BLD AUTO: 1 K/UL (ref 0.3–1)
MONOCYTES NFR BLD: 3.6 % (ref 4–15)
MONOCYTES NFR BLD: 3.8 % (ref 4–15)
MONOCYTES NFR BLD: 5.9 % (ref 4–15)
NEUTROPHILS # BLD AUTO: 12.9 K/UL (ref 1.8–7.7)
NEUTROPHILS # BLD AUTO: 14.4 K/UL (ref 1.8–7.7)
NEUTROPHILS # BLD AUTO: 15.8 K/UL (ref 1.8–7.7)
NEUTROPHILS NFR BLD: 87.8 % (ref 38–73)
NEUTROPHILS NFR BLD: 89.7 % (ref 38–73)
NEUTROPHILS NFR BLD: 91.6 % (ref 38–73)
NRBC BLD-RTO: 0 /100 WBC
PHOSPHATE SERPL-MCNC: 1.9 MG/DL (ref 2.7–4.5)
PHOSPHATE SERPL-MCNC: 2 MG/DL (ref 2.7–4.5)
PHOSPHATE SERPL-MCNC: 2.2 MG/DL (ref 2.7–4.5)
PLATELET # BLD AUTO: 146 K/UL (ref 150–450)
PLATELET # BLD AUTO: 152 K/UL (ref 150–450)
PLATELET # BLD AUTO: 160 K/UL (ref 150–450)
PMV BLD AUTO: 12.5 FL (ref 9.2–12.9)
PMV BLD AUTO: 12.6 FL (ref 9.2–12.9)
PMV BLD AUTO: 12.7 FL (ref 9.2–12.9)
POCT GLUCOSE: 108 MG/DL (ref 70–110)
POCT GLUCOSE: 118 MG/DL (ref 70–110)
POCT GLUCOSE: 134 MG/DL (ref 70–110)
POCT GLUCOSE: 153 MG/DL (ref 70–110)
POCT GLUCOSE: 171 MG/DL (ref 70–110)
POCT GLUCOSE: 95 MG/DL (ref 70–110)
POTASSIUM SERPL-SCNC: 5 MMOL/L (ref 3.5–5.1)
PROT SERPL-MCNC: 6.3 G/DL (ref 6–8.4)
PROT SERPL-MCNC: 6.7 G/DL (ref 6–8.4)
PROTHROMBIN TIME: 10.2 SEC (ref 9–12.5)
PROTHROMBIN TIME: 10.2 SEC (ref 9–12.5)
PROTHROMBIN TIME: 10.3 SEC (ref 9–12.5)
RBC # BLD AUTO: 2.96 M/UL (ref 4.6–6.2)
RBC # BLD AUTO: 3.06 M/UL (ref 4.6–6.2)
RBC # BLD AUTO: 3.27 M/UL (ref 4.6–6.2)
SODIUM SERPL-SCNC: 136 MMOL/L (ref 136–145)
WBC # BLD AUTO: 14.43 K/UL (ref 3.9–12.7)
WBC # BLD AUTO: 16.46 K/UL (ref 3.9–12.7)
WBC # BLD AUTO: 17.28 K/UL (ref 3.9–12.7)

## 2022-08-18 PROCEDURE — 36415 COLL VENOUS BLD VENIPUNCTURE: CPT | Performed by: INTERNAL MEDICINE

## 2022-08-18 PROCEDURE — 97116 GAIT TRAINING THERAPY: CPT

## 2022-08-18 PROCEDURE — 93750 PR INTERROGATE VENT ASSIST DEV, IN PERSON, W PHYSICIAN ANALYSIS: ICD-10-PCS | Mod: ,,, | Performed by: INTERNAL MEDICINE

## 2022-08-18 PROCEDURE — 25000003 PHARM REV CODE 250: Performed by: INTERNAL MEDICINE

## 2022-08-18 PROCEDURE — 63600175 PHARM REV CODE 636 W HCPCS: Performed by: STUDENT IN AN ORGANIZED HEALTH CARE EDUCATION/TRAINING PROGRAM

## 2022-08-18 PROCEDURE — 99900026 HC AIRWAY MAINTENANCE (STAT)

## 2022-08-18 PROCEDURE — 94761 N-INVAS EAR/PLS OXIMETRY MLT: CPT

## 2022-08-18 PROCEDURE — 27000248 HC VAD-ADDITIONAL DAY

## 2022-08-18 PROCEDURE — 99233 PR SUBSEQUENT HOSPITAL CARE,LEVL III: ICD-10-PCS | Mod: ,,, | Performed by: INTERNAL MEDICINE

## 2022-08-18 PROCEDURE — 85730 THROMBOPLASTIN TIME PARTIAL: CPT | Mod: 91 | Performed by: INTERNAL MEDICINE

## 2022-08-18 PROCEDURE — 25000003 PHARM REV CODE 250: Performed by: STUDENT IN AN ORGANIZED HEALTH CARE EDUCATION/TRAINING PROGRAM

## 2022-08-18 PROCEDURE — 99233 SBSQ HOSP IP/OBS HIGH 50: CPT | Mod: ,,, | Performed by: INTERNAL MEDICINE

## 2022-08-18 PROCEDURE — 25000242 PHARM REV CODE 250 ALT 637 W/ HCPCS: Performed by: THORACIC SURGERY (CARDIOTHORACIC VASCULAR SURGERY)

## 2022-08-18 PROCEDURE — 83735 ASSAY OF MAGNESIUM: CPT | Performed by: THORACIC SURGERY (CARDIOTHORACIC VASCULAR SURGERY)

## 2022-08-18 PROCEDURE — 25000003 PHARM REV CODE 250

## 2022-08-18 PROCEDURE — 92526 ORAL FUNCTION THERAPY: CPT

## 2022-08-18 PROCEDURE — 99900022

## 2022-08-18 PROCEDURE — 93750 INTERROGATION VAD IN PERSON: CPT | Mod: ,,, | Performed by: INTERNAL MEDICINE

## 2022-08-18 PROCEDURE — 84100 ASSAY OF PHOSPHORUS: CPT | Mod: 91 | Performed by: THORACIC SURGERY (CARDIOTHORACIC VASCULAR SURGERY)

## 2022-08-18 PROCEDURE — 94640 AIRWAY INHALATION TREATMENT: CPT

## 2022-08-18 PROCEDURE — 27000221 HC OXYGEN, UP TO 24 HOURS

## 2022-08-18 PROCEDURE — 80076 HEPATIC FUNCTION PANEL: CPT | Performed by: STUDENT IN AN ORGANIZED HEALTH CARE EDUCATION/TRAINING PROGRAM

## 2022-08-18 PROCEDURE — 97110 THERAPEUTIC EXERCISES: CPT

## 2022-08-18 PROCEDURE — 85610 PROTHROMBIN TIME: CPT | Mod: 91 | Performed by: THORACIC SURGERY (CARDIOTHORACIC VASCULAR SURGERY)

## 2022-08-18 PROCEDURE — 99900035 HC TECH TIME PER 15 MIN (STAT)

## 2022-08-18 PROCEDURE — 99900031 HC PATIENT EDUCATION (STAT)

## 2022-08-18 PROCEDURE — 80053 COMPREHEN METABOLIC PANEL: CPT | Performed by: THORACIC SURGERY (CARDIOTHORACIC VASCULAR SURGERY)

## 2022-08-18 PROCEDURE — 36415 COLL VENOUS BLD VENIPUNCTURE: CPT | Performed by: THORACIC SURGERY (CARDIOTHORACIC VASCULAR SURGERY)

## 2022-08-18 PROCEDURE — 20600001 HC STEP DOWN PRIVATE ROOM

## 2022-08-18 PROCEDURE — 85025 COMPLETE CBC W/AUTO DIFF WBC: CPT | Performed by: THORACIC SURGERY (CARDIOTHORACIC VASCULAR SURGERY)

## 2022-08-18 PROCEDURE — 97530 THERAPEUTIC ACTIVITIES: CPT

## 2022-08-18 PROCEDURE — 97535 SELF CARE MNGMENT TRAINING: CPT

## 2022-08-18 PROCEDURE — 25000242 PHARM REV CODE 250 ALT 637 W/ HCPCS

## 2022-08-18 PROCEDURE — 25000003 PHARM REV CODE 250: Performed by: PHYSICIAN ASSISTANT

## 2022-08-18 RX ORDER — WARFARIN 2 MG/1
4 TABLET ORAL DAILY
Status: DISCONTINUED | OUTPATIENT
Start: 2022-08-18 | End: 2022-08-19

## 2022-08-18 RX ORDER — NAPROXEN SODIUM 220 MG/1
81 TABLET, FILM COATED ORAL DAILY
Status: DISCONTINUED | OUTPATIENT
Start: 2022-08-19 | End: 2022-09-01 | Stop reason: HOSPADM

## 2022-08-18 RX ORDER — BUSPIRONE HYDROCHLORIDE 5 MG/1
5 TABLET ORAL 3 TIMES DAILY
Status: DISCONTINUED | OUTPATIENT
Start: 2022-08-18 | End: 2022-08-23

## 2022-08-18 RX ORDER — DOCUSATE SODIUM 50 MG/5ML
100 LIQUID ORAL DAILY
Status: DISCONTINUED | OUTPATIENT
Start: 2022-08-19 | End: 2022-08-19

## 2022-08-18 RX ORDER — POLYETHYLENE GLYCOL 3350 17 G/17G
17 POWDER, FOR SOLUTION ORAL DAILY
Status: DISCONTINUED | OUTPATIENT
Start: 2022-08-19 | End: 2022-09-01 | Stop reason: HOSPADM

## 2022-08-18 RX ORDER — PANTOPRAZOLE SODIUM 40 MG/1
40 FOR SUSPENSION ORAL DAILY
Status: DISCONTINUED | OUTPATIENT
Start: 2022-08-19 | End: 2022-08-23

## 2022-08-18 RX ORDER — PREDNISONE 20 MG/1
20 TABLET ORAL DAILY
Status: DISCONTINUED | OUTPATIENT
Start: 2022-08-19 | End: 2022-08-25

## 2022-08-18 RX ADMIN — Medication 400 MG: at 09:08

## 2022-08-18 RX ADMIN — METHIMAZOLE 5 MG: 5 TABLET ORAL at 09:08

## 2022-08-18 RX ADMIN — WARFARIN SODIUM 4 MG: 2 TABLET ORAL at 05:08

## 2022-08-18 RX ADMIN — HEPARIN SODIUM 23 UNITS/KG/HR: 5000 INJECTION INTRAVENOUS; SUBCUTANEOUS at 05:08

## 2022-08-18 RX ADMIN — HEPARIN SODIUM 23 UNITS/KG/HR: 5000 INJECTION INTRAVENOUS; SUBCUTANEOUS at 02:08

## 2022-08-18 RX ADMIN — MEXILETINE HYDROCHLORIDE 400 MG: 200 CAPSULE ORAL at 09:08

## 2022-08-18 RX ADMIN — BUSPIRONE HYDROCHLORIDE 5 MG: 5 TABLET ORAL at 02:08

## 2022-08-18 RX ADMIN — ACETYLCYSTEINE 4 ML: 100 SOLUTION ORAL; RESPIRATORY (INHALATION) at 02:08

## 2022-08-18 RX ADMIN — ACETYLCYSTEINE 4 ML: 100 SOLUTION ORAL; RESPIRATORY (INHALATION) at 07:08

## 2022-08-18 RX ADMIN — MEXILETINE HYDROCHLORIDE 400 MG: 200 CAPSULE ORAL at 02:08

## 2022-08-18 RX ADMIN — PREDNISONE 20 MG: 20 TABLET ORAL at 09:08

## 2022-08-18 RX ADMIN — MIRTAZAPINE 15 MG: 15 TABLET, FILM COATED ORAL at 09:08

## 2022-08-18 RX ADMIN — LEVALBUTEROL HYDROCHLORIDE 0.63 MG: 0.63 SOLUTION RESPIRATORY (INHALATION) at 02:08

## 2022-08-18 RX ADMIN — BUSPIRONE HYDROCHLORIDE 5 MG: 5 TABLET ORAL at 09:08

## 2022-08-18 RX ADMIN — SODIUM CHLORIDE 250 ML: 0.9 INJECTION, SOLUTION INTRAVENOUS at 05:08

## 2022-08-18 RX ADMIN — ASPIRIN 81 MG CHEWABLE TABLET 81 MG: 81 TABLET CHEWABLE at 09:08

## 2022-08-18 RX ADMIN — PANTOPRAZOLE SODIUM 40 MG: 40 GRANULE, DELAYED RELEASE ORAL at 09:08

## 2022-08-18 RX ADMIN — AMIODARONE HYDROCHLORIDE 400 MG: 200 TABLET ORAL at 09:08

## 2022-08-18 RX ADMIN — LEVALBUTEROL HYDROCHLORIDE 0.63 MG: 0.63 SOLUTION RESPIRATORY (INHALATION) at 07:08

## 2022-08-18 RX ADMIN — LEVALBUTEROL HYDROCHLORIDE 0.63 MG: 0.63 SOLUTION RESPIRATORY (INHALATION) at 01:08

## 2022-08-18 RX ADMIN — MEXILETINE HYDROCHLORIDE 400 MG: 200 CAPSULE ORAL at 05:08

## 2022-08-18 NOTE — PROGRESS NOTES
[facility]  Heart Failure  Progress Note     Hospital Length of Stay: 52  Interval History:  - No acute event overnight.   - Tolerating tube feeding, having regular BM.   - Working with PT/OT/SLP.     Vitals:  Temp:  [97.3 °F (36.3 °C)-98.6 °F (37 °C)] 97.6 °F (36.4 °C)  Pulse:  [57-83] 72  Resp:  [17-28] 18  SpO2:  [90 %-99 %] 90 %  BP: (64-74)/(0) 70/0  Arterial Line BP: (68-79)/(58-69) 78/66    Intake/Output    Intake/Output Summary (Last 24 hours) at 8/18/2022 1254  Last data filed at 8/18/2022 0922  Gross per 24 hour   Intake 551.82 ml   Output 1000 ml   Net -448.18 ml     Physical Exam  Gen: Trach in place. No evidence of bleeding. Alert and awake.   HEENT: Pupils equal and reactive to light.   Cardio: VAD hum obstructing heart sounds  Resp: No additional sound heard.   Abd: Soft, non-tender, non-distended  Skin: Warm,  trace peripheral edema  Neuro: No gross abnormalities.   Psych: Normal mood and affect.      Labs:  Recent Labs   Lab 08/17/22  1150 08/17/22 2024 08/18/22  0841   WBC 19.02* 19.29* 16.46*   HGB 8.6* 8.4* 8.5*   HCT 31.1* 29.0* 30.1*    170 146*     Recent Labs   Lab 08/17/22  0343 08/17/22  0757 08/17/22  1150 08/17/22 2024 08/18/22  0841    141 141  --   --    K 4.3 4.0  4.0 4.8  --   --    * 115* 113*  --   --    CO2 17* 19* 20*  --   --    BUN 45* 46* 45*  --   --    CREATININE 1.1 1.2 1.4  --   --    * 136* 146*  --   --    CALCIUM 9.5 9.5 9.3  --   --    MG 2.8*  --  2.8* 2.8* 2.7*   PHOS 2.4*  --  2.4* 2.3* 1.9*       Micro:    Current Meds:   amiodarone  400 mg Oral Daily    aspirin  81 mg Per OG tube Daily    busPIRone  5 mg Per NG tube TID    calcium gluconate IVPB  1 g Intravenous Once    dextrose 10 % in water (D10W)  25 g Intravenous Once    docusate  100 mg Per NG tube Daily    levalbuterol  0.63 mg Nebulization Q6H    magnesium oxide  400 mg Oral BID    methIMAzole  5 mg Oral Daily    mexiletine  400 mg Oral Q8H     mirtazapine  15 mg Oral QHS    ondansetron  4 mg Intravenous Once    pantoprazole  40 mg Per NG tube Daily    polyethylene glycol  17 g Per NG tube Daily    predniSONE  20 mg Per NG tube Daily    warfarin  4 mg Oral Daily       Continuous Infusions:   dextrose 10 % in water (D10W)      heparin (porcine) in D5W 23 Units/kg/hr (08/18/22 0227)     Assessment and Plan:  Cardiogenic Shock  -Previous HM2, underwent pump exchange to HM3 on 7/13/22 complicated by worsening CS/RV failure requiring VA ECMO now de cannulated.  -Currently trach ed. Chest tube removal, bronch, and old driveline removed 7/22.  -Re-intubated on 7/29/22 due to hypercapnic resp failure. Trach placed on 8/4.   -Doing well on Trach collar.   -Off diuretics currently. Creatinine has trended down to baseline.   -Plan to transfer him out to step down unit today.     Trach site bleeding: Resolved.   - Continue minimal dosing heparin.   - Surgical team placed a suture on 8/11.       LVAD:  - No significant events.   - Functioning well.     Fever: more than a week ago.   Leucocytosis trending down albeit slowly.   Completed one week of meropenem on 8/16. Prior to that completed course of cefepime and flagyl.   No growth in cultures yet.      History of ventricular tachycardia/Episode of A flutter 2:1 block:  Patient experienced an episode of VT on 6/30 and experienced an ICD shock thought to be related to hypokalemia (K of 3.1 on 6/30)  Aggressively replete K, keep K>4 and Mg>2  Continue mexiletine PO.  Amio gtt transitioned to PO.     COVID-19 virus infection:  -Previously hypoxic then recovered  -ID was consulted, recommended Remdesivir, course completed     Elevated serum lactate dehydrogenase (LDH):  S/p HM3 exchange for HM2 pump thrombosis  Continue to trend LDH.      Amiodarone induced hypothyrodism initially, now hyperthyroidism:  -Endocrine team on board.  -On prednisone and methimazole.   - Prednisone 40 mg.   -Repeat T4 tomorrow.   -  Medications has been changes as per Endocrinology team.     Scot Card MD, FACP  Cardiovascular Disease Fellow PGY4  [facility]

## 2022-08-18 NOTE — NURSING
Visited with patient as he was working with therapy. Patient needs a lot of emotional support and encouragement as he is severely debilitated post VAD exchange. Patient aware that he will need to go to rehab on discharge. Pt is okay and happy about this as he wants to get stronger.     I informed patient that I am working to get him scheduled for sunshine therapy on tomorrow. Pt excited about getting outside. Will discuss with nursing staff.

## 2022-08-18 NOTE — PLAN OF CARE
Pt educated on fall risk overnight,pt remained free from falls/trauma/injury. Denies chest pain, SOB, palpitations, dizziness, pain, or discomfort. Trach collar inplace. O2 at 8 L. LVAD numbers and DP WNL. NO any LFA's throughout the night. BG monitored. Turned q 2 hr. Heparin contd at 23 units/kg/hr. Plan of care reviewed with pt, all questions answered. Bed locked in lowest position, call bell within reach, no acute distress noted, will continue to monitor.

## 2022-08-18 NOTE — PT/OT/SLP PROGRESS
Physical Therapy Treatment    Patient Name:  Tim Richards   MRN:  [id number]  Admit Date: 6/27/2022  Admitting Diagnosis:  Left ventricular assist device (LVAD) complication   Length of Stay: 52 days  Recent Surgery: Procedure(s) (LRB):  CREATION, TRACHEOSTOMY (N/A)  INSERTION, CENTRAL VENOUS ACCESS DEVICE 14 Days Post-Op    Recommendations:     Discharge Recommendations:  rehabilitation facility   Discharge Equipment Recommendations:  (TBD)   Barriers to discharge: None    Plan:     During this hospitalization, patient to be seen 5 x/week to address the listed problems via gait training, therapeutic activities, therapeutic exercises, neuromuscular re-education  · Plan of Care Expires:  08/21/22   Plan of Care Reviewed with: patient    Assessment:     Tim Richards is a 56 y.o. male admitted with a medical diagnosis of Left ventricular assist device (LVAD) complication. Pt found alert and cooperative. Pt demo'd good motivation in session. Pt's primary limitations were fatigue and generalized weakness. Pt progressing towards goals, but not at PLOF. Pt tolerated session well.  Pt is improving with therapy evidenced by decreased assistance with sit to stand transfer and ability to initiate gait. Pt would benefit from continued PT services to improve overall functional mobility. Recommend d/c to Rehab to maximize functional independence.        Problem List: impaired endurance, weakness, impaired self care skills, impaired functional mobility, gait instability, decreased upper extremity function, decreased lower extremity function, impaired cardiopulmonary response to activity.  Rehab Prognosis: Good     GOALS:   Multidisciplinary Problems     Physical Therapy Goals        Problem: Physical Therapy    Goal Priority Disciplines Outcome Goal Variances Interventions   Physical Therapy Goal     PT, PT/OT Ongoing, Progressing     Description: Goals to be met by: 9/1/2022    Patient will increase functional  independence with mobility by performin. Supine to sit with MInimal Assistance -not met  2. Sit to stand transfer with Contact Guard Assistance -not met  3. Gait  x 150 feet with Contact Guard Assistance with approved assistive device -not met  4. Ascend/descend 8 stair with right Handrails Contact Guard Assistance -not met  5. Sitting at edge of bed x15 minutes with Contact Guard Assistance to improve tolerance to activities. -not met  6. Lower extremity exercise program x15 reps with assistance as needed -not met               Multidisciplinary Problems (Resolved)        Problem: Physical Therapy    Goal Priority Disciplines Outcome Goal Variances Interventions   Physical Therapy Goal   (Resolved)     PT, PT/OT Met     Description: The patient is near his functional baseline, he ambulated within the room with no AD and independence. Unable to do stair training, assess gait endurance due to COVID restrictions. He is safe to return home with no therapy needs. He is in agreement with PT discharge, he states he is confident he can ascend/descend his stairs.           Problem: Physical Therapy    Goal Priority Disciplines Outcome Goal Variances Interventions   Physical Therapy Goal   (Resolved)     PT, PT/OT Met                     Subjective   Communicated with RN prior to session.  Patient found HOB elevated upon PT entry to room, agreeable to evaluation. Tim Richards's alone during session.    Chief Complaint: debility; fatigue  Patient/Family Comments/goals: to get better  Pain/Comfort:  · Pain Rating 1: 0/10  · Pain Rating Post-Intervention 1: 0/10    Objective:   Patient found with: telemetry, peripheral IV, NG tube, LVAD, oxygen, Tracheostomy   General Precautions: Standard, Cardiac fall, LVAD, sternal   Orthopedic Precautions:N/A   Braces: N/A   Oxygen Device: Trach Collar   Vitals: BP (!) 70/0 (BP Location: Left arm, Patient Position: Lying)   Pulse 72   Temp 97.6 °F (36.4 °C) (Oral)   Resp 18   " Ht 6' 1" (1.854 m)   Wt 87.4 kg (192 lb 10.9 oz)   SpO2 (!) 90%   BMI 25.42 kg/m²     Outcome Measures:  AM-PAC 6 CLICK MOBILITY  Turning over in bed (including adjusting bedclothes, sheets and blankets)?: 3  Sitting down on and standing up from a chair with arms (e.g., wheelchair, bedside commode, etc.): 2  Moving from lying on back to sitting on the side of the bed?: 3  Moving to and from a bed to a chair (including a wheelchair)?: 2  Need to walk in hospital room?: 2  Climbing 3-5 steps with a railing?: 1  Basic Mobility Total Score: 13   ·     Functional Mobility:  Additional staff present: OT - due to pt requiring 2 skilled therapists to safely perform functional mobility and to accommodate pt's activity tolerance  Bed Mobility:    Supine to Sit: stand by assistance; HOB elevated   Scooting anteriorly to EOB to have both feet planted on floor: stand by assistance   Sit to Supine: minimum assistance; HOB flat    Sitting Balance at Edge of Bed:   Assistance Level Required: Stand-by Assistance   Time: 12 minutes   Comments:   o Worked on activity tolerance sitting EOB and worked on tolerance to positional change   o VAD numbers stable     Transfers:    Sit <> Stand Transfer: moderate assistance from EOB x 2 trials       Gait:  · Patient ambulated: 6 side steps    · Patient required: maximal assist and of 2 persons  · Gait Deviation(s): unsteady gait, decreased step length, narrow base of support and decreased arminda  · Impairments due to: impaired balance, decreased strength and decreased endurance  · Comments:   · Pt with B knee buckling requiring B knee blocking and gaurding  · Facilitation of RW management and hip and thoracic extension   · Verbal cuing for upright posture and forward gaze   · Pt demo'd increased energy expenditure with gait       Therapeutic Activities, Exercises, and Education:   Educated pt on PT role/POC  Educated pt on importance of OOB activity and daily ambulation   Educated " pt on sternal precautions  Pt verbalized understanding       Patient left HOB elevated with all lines intact, call button in reach and RN notified and LVAD coordinator present     Time Tracking:     PT Received On: 08/18/22  PT Start Time: 1017     PT Stop Time: 1044  PT Total Time (min): 27 min       Billable Minutes:   · Gait Training 8 and Therapeutic Activity 15    Treatment Type: Treatment  PT/PTA: PT

## 2022-08-18 NOTE — PROGRESS NOTES
Interdisciplinary Rounds Report:   Attended interdisciplinary rounds with the [facility]/CTS services including the LVAD Coordinators, social workers, cardiologists, surgeons,  PT/OT/Speech, dietician, and unit charge nurses. Discussed patient status, plan of care, goals of care, including DC date, and post discharge needs. Plan of care will be discussed with the patient and/or family per the physician while rounding on the floor. This is a recurring meeting that is medically and socially necessary to collaborate with the interdisciplinary team to assist patient needs and safe discharge.

## 2022-08-18 NOTE — PROGRESS NOTES
08/18/2022  Fitz Christianson    Current provider:  Amy Rhodes MD    Device interrogation:  TXP LVAD INTERROGATIONS 8/18/2022 8/18/2022 8/17/2022 8/17/2022 8/17/2022 8/17/2022 8/17/2022   Type HeartMate3 HeartMate3 HeartMate3 HeartMate3 HeartMate3 HeartMate3 HeartMate3   Flow 5.2 5.5 5.5 5.4 5.5 5.4 5.5   Speed 6300 6300 6300 6300 6300 6300 6300   PI 3.6 3.0 3.0 2.8 2.7 2.5 3.2   Power (Jackson) 5.3 5.3 5.3 5.3 5.4 5.3 5.4   LSL 5900 5900 5900 5900 5900 5900 5900   Low Flow Alarm - - - - - - -   Pulsatility No Pulse No Pulse Intermittent pulse Intermittent pulse Intermittent pulse Intermittent pulse Intermittent pulse          Rounded on Tim Richards to ensure all mechanical assist device settings (IABP or VAD) were appropriate and all parameters were within limits.  I was able to ensure all back up equipment was present, the staff had no issues, and the Perfusion Department daily rounding was complete.      For implantable VADs: Interrogation of Ventricular assist device was performed with analysis of device parameters and review of device function. I have personally reviewed the interrogation findings and agree with findings as stated.     In emergency, the nursing units have been notified to contact the perfusion department either by:  Calling [id number] from 630am to 4pm Mon thru Fri, utilizing the On-Call Finder functionality of Epic and searching for Perfusion, or by contacting the hospital  from 4pm to 630am and on weekends and asking to speak with the perfusionist on call.    8:38 AM

## 2022-08-18 NOTE — PROGRESS NOTES
08/18/22 1320   Vital Signs   Resp (!) 25   SpO2 99 %   Pulse Oximetry Type Continuous   Oximetry Probe Site Applied   Respiratory tech @ bedside assessing pt. Tech performed suctioning of trach. Pt placed on continuous SPO2. Pt states he is breathing better. Will continue to monitor.

## 2022-08-18 NOTE — NURSING
As per provider with HTS Thoto Aptt STAT ordered. Aptt was not therapeutic. To go off from new aptt reading.

## 2022-08-18 NOTE — PLAN OF CARE
57yo M with NICM with LVAD, s/p ICD for VT on amiodarone and mexiletine and AIT followed by hypothyroidism initially admitted 6/27/2022 with COVID respiratory failure complicated with LVAD pump thrombosis that was refractory to medical therapy. Underwent LVAD pump exchange. Post-op course complicated by worsening cardiogenic shock/RV failure requiring VA-ECMO. Improved clinically underwent successful decannulation. Continued episodes of VT with ICD shock 7/21 and ongoing anti-arrhythmic mediation adjustments.Patient re-intubated 07/29/2022 and now with trach and PEG.     On admission he was initially continued on home levothyroxine. However, TFTs now significant for recurrent hyperthyroidism, on 7/13 with low TSH and high fT4, Repeat TFTs on 7/27 with undetectable TSH and elevated fT4. Endocrinology was consulted for recurrent AIT. Levothyroxine was stopped and he was started on methimazole and prednisone for AIT.    Regarding AIT:  - History of AIT type 2 (TRAb and TSI negative; Thyroid US unremarkable with low vascularity)  - Weaned off methimazole and prednisone by May 2020, then developed hypothyroidism, Levothyroxine started and dose titrated per TFTs. Prior to current admission he was on Levothyroxine 112 mcg daily  - Labs consistent with hypothyroidism until current admission, initially on 7/13, with low TSH and elevated fT4. Repeat TFTs on 7/27 with worsening hyperthyroidism. He continued to receive LT4 112 mcg through 7/28. He remains on amiodarone for episodes of Ventricular Tachycardia  - Suspect current hyperthyroidism is likely due to AIT-2, however continued exogenous LT4 certainly contribute to current presentation   - fT4 now normal         Plan  - Strongly suspect AIT type 2 with improvement seen currently with the use of prednisone.   - Heparin can cause a false elevation in free T4 as it displaces free T4 from TBG; will follow-up direct dialysis result collected 8.5.22 -- lab was cancelled by  receiving lab as sample insufficient to perform lab   - Continue methimazole 5mg daily and prednisone 20 mg daily and re-evaluate outpatient  - Stop daily fT4, will check fT4 again on 8/19/2022     amiodarone induced hypothyrodism  - Levothyroxine discontinued on 7/28/2022  - Please see plan as above        Hyperglycemia  Key History and Diagnostic Findings  - Hyperglycemia noted once starting TPN in addition to steroids  - No prior history of diabetes; most recent A1c of 5.4 on 04/26/2021  - Blood sugars tight in last 24 hours --even after stopping scheduled levemir and reducing scheduled aspart   - With decreasing steroids anticipate decreased insulin requirement  - Minimal insulin req with decreased steroid dose      Plan  - Continue LDC only  - Please notify endocrine if any change in tube feeds as this will change insulin requirements.  - Please hold insulin if tube feeds are held for some reason  - Please ensure that accuchecks are being documented every 4 hours         María Brice MD  [facility], [address]  [address] [place], [place], [address]    Office: [phone]  Fax: [phone]

## 2022-08-18 NOTE — PT/OT/SLP PROGRESS
Speech Language Pathology Treatment    Patient Name:  Tim Richards   MRN:  [id number]  Admitting Diagnosis: Left ventricular assist device (LVAD) complication    Recommendations:                 General Recommendations:  Dysphagia therapy  Diet recommendations:     Liquid Diet Level: Nectar Thick liquids including Boost  1pkt to 6oz     · Full oral diet of BOOST as per nutrition or MD recs with goal to remove Ng tube   · Boost MUST also be thickened to NECTAR thick   · No solids, purees or thin liquids at this time    Ok for ice chips for the purpose for completing swallowing exercises   2-3 x daily complete the following   1. Effortful swallow x10  2. Falsetto /i/ x10  3. Ro Maneuver x3    General Precautions: Standard, fall, LVAD, sternal  Communication strategies:  One way speaking valve    Subjective     Drowsy yet pleasant     Pain/Comfort:  Pain Rating 1: 0/10  Pain Rating Post-Intervention 1: 0/10    Respiratory Status: trach collar     Objective:     Has the patient been evaluated by SLP for swallowing?   Yes  Keep patient NPO? No     Pt upright in bed and appearing in a light sleep state. Pt with PMSV in place upon arrival. Pt and team eager to repeat MBSS and SLP discussed optimal timeline for allowing purposeful recovery of swallow function would be approx 1.5- 2 weeks since initial study. SLP discussed with pt and team advancing pt to a nectar thick liquid diet with supplemental nutritious beverages (such as Boost, which also MUST be thickened) . If pt meets his needs via nectar thick boost by mouth this will allow him to have NG tube removed and continue with functional swallowing practice to best  prepare him for repeat MBS Mon/tues of next week with hope of advancing to solids and thin liquids.  SLP communicated with pt and team recommendations and updated whiteboard. All parties in agreement with plan. Pt with home exercise swallow program at the bedside within reach and reports completing  multiple times daily. Pt reporting fatigue following recent PT session and requesting going to sleep at this time. SLP removed speaking valve and pt left in HOB elevated position in bed with call light within reach. SLP will follow up.    Assessment:     Tim Richards is a 56 y.o. male with an SLP diagnosis of Dysphagia.      Goals:   Multidisciplinary Problems     SLP Goals        Problem: SLP    Goal Priority Disciplines Outcome   SLP Goal     SLP Ongoing, Progressing   Description: Speech Language Pathology Goals  Goals expected to be met by 8/18    1.Pt will participate in more objective swallow assessment via MBSS to help determine least restrictive diet   2. . Pt will tolerate PMSV for waking hours to increase phonation, respiration and swallowing aspects  3. Pt/family will don/doff PMSV x1 during session with min cues      Speech Language Pathology Goals  Goals expected to be met by 8/25/22    1. Pt will tolerate full nectar thick liquid diet without overt clinical signs of aspiration   2. Pt will participate in repeat MBS to help determine least restrictive diet   3. Pt will continue to tolerate PMSV for all waking hours                            Plan:     · Patient to be seen:  4 x/week   · Plan of Care reviewed with:  patient   · SLP Follow-Up:  Yes       Discharge recommendations:  rehabilitation facility     Time Tracking:     SLP Treatment Date:   08/18/22  Speech Start Time:  1149  Speech Stop Time:  1158     Speech Total Time (min):  9 min    Billable Minutes: Treatment Swallowing Dysfunction 9    08/18/2022

## 2022-08-18 NOTE — RESPIRATORY THERAPY
RAPID RESPONSE RESPIRATORY THERAPY PROACTIVE NOTE           Time of visit: 0758    Code Status: Full Code   : 1965  Bed: 311/311 A:   MRN: [id number]  Time spent at the bedside: < 15 min    SITUATION    Evaluated patient for: LDA Check     BACKGROUND    Patient has a past medical history of Anticoagulant long-term use, CHF (congestive heart failure), Class 1 obesity due to excess calories with serious comorbidity and body mass index (BMI) of 31.0 to 31.9 in adult, Dilated cardiomyopathy, Disorder of kidney and ureter, Encounter for blood transfusion, Gout, HTN (hypertension), psychiatric care, ICD (implantable cardioverter-defibrillator) infection, Psychiatric problem, Thyroid disease, and Ventricular tachycardia (paroxysmal).  Clinically Significant Surgical Hx: tracheostomy    24 Hours Vitals Range:  Temp:  [97.3 °F (36.3 °C)-98.6 °F (37 °C)]   Pulse:  [57-83]   Resp:  [17-31]   BP: (64-74)/(0)   SpO2:  [94 %-99 %]   Arterial Line BP: (68-85)/(58-72)     Labs:    Recent Labs     22  0343 22  0757 22  1150 22    141 141  --    K 4.3 4.0  4.0 4.8  --    * 115* 113*  --    CO2 17* 19* 20*  --    CREATININE 1.1 1.2 1.4  --    * 136* 146*  --    PHOS 2.4*  --  2.4* 2.3*   MG 2.8*  --  2.8* 2.8*        Recent Labs     22  0441   PH 7.361   PCO2 40.3   PO2 79*   HCO3 22.8*   POCSATURATED 95   BE -3       ASSESSMENT/INTERVENTIONS  Pt resting and has no concerns at this time  RT assigned at bedside doing trach care      Last VS   Temp: 98 °F (36.7 °C) (713)  Pulse: 83 (742)  Resp: 18 (742)  BP: 66/0 (713)  SpO2: 96 % (742)  Arterial Line BP: 78/66 (5)      Extra trachs at bedside: 6.0 and 8.0 both shiley cuffed  Level of Consciousness: Level of Consciousness (AVPU): alert  Respiratory Effort: Respiratory Effort: Unlabored Expansion/Accessory Muscle Usage: Expansion/Accessory Muscles/Retractions: no retractions, no use  of accessory muscles  All Lung Field Breath Sounds: All Lung Fields Breath Sounds: Anterior:, Lateral:, diminished, equal bilaterally, coarse  SHERWIN Breath Sounds: diminished  LLL Breath Sounds: coarse, diminished  RUL Breath Sounds: Anterior:, rhonchi  RML Breath Sounds: coarse  RLL Breath Sounds: diminished  O2 Device/Concentration: TC 8L 35%  Surgical airway: Yes, Type: Shiley Size: 8, cuffed  Ambu at bedside: Ambu bag with the patient?: Yes, Adult Ambu     Active Orders   Respiratory Care    Chest physiotherapy Q6H PRN     Frequency: Q6H PRN     Number of Occurrences: Until Specified     Order Questions:      Indications: COPIOUS SPUTUM PRODUCTION    Inhalation Treatment Q6H     Frequency: Q6H     Number of Occurrences: Until Specified    Oxygen Continuous     Frequency: Continuous     Number of Occurrences: Until Specified     Order Questions:      Device type: Low flow      Device: Trach Collar      FiO2%: 40      Titrate O2 per Oxygen Titration Protocol: Yes      To maintain SpO2 goal of: >= 90%      Notify MD of: Inability to achieve desired SpO2; Sudden change in patient status and requires 20% increase in FiO2; Patient requires >60% FiO2    POCT ARTERIAL BLOOD GAS Blood Gas     Frequency: PRN     Number of Occurrences: Until Specified     Order Comments: Notify Physician if: see parameters below.       Order Questions:      Component: Blood Gas    Pulse Oximetry Q4H     Frequency: Q4H     Number of Occurrences: Until Specified    Routine tracheostomy care     Frequency: BID     Number of Occurrences: Until Specified       RECOMMENDATIONS    We recommend: RRT Recs: Continue POC per primary team.      FOLLOW-UP    Please call back the Rapid Response RTLaura RRT at x [phone] for any questions or concerns.

## 2022-08-19 LAB
ALBUMIN SERPL BCP-MCNC: 2.1 G/DL (ref 3.5–5.2)
ALBUMIN SERPL BCP-MCNC: 2.2 G/DL (ref 3.5–5.2)
ALP SERPL-CCNC: 236 U/L (ref 55–135)
ALP SERPL-CCNC: 239 U/L (ref 55–135)
ALT SERPL W/O P-5'-P-CCNC: 68 U/L (ref 10–44)
ALT SERPL W/O P-5'-P-CCNC: 71 U/L (ref 10–44)
ANION GAP SERPL CALC-SCNC: 5 MMOL/L (ref 8–16)
ANION GAP SERPL CALC-SCNC: 6 MMOL/L (ref 8–16)
APTT BLDCRRT: 44.4 SEC (ref 21–32)
APTT BLDCRRT: 46.3 SEC (ref 21–32)
AST SERPL-CCNC: 47 U/L (ref 10–40)
AST SERPL-CCNC: 62 U/L (ref 10–40)
BACTERIA #/AREA URNS AUTO: NORMAL /HPF
BASOPHILS # BLD AUTO: 0.01 K/UL (ref 0–0.2)
BASOPHILS NFR BLD: 0.1 % (ref 0–1.9)
BILIRUB DIRECT SERPL-MCNC: 1 MG/DL (ref 0.1–0.3)
BILIRUB SERPL-MCNC: 1.8 MG/DL (ref 0.1–1)
BILIRUB SERPL-MCNC: 1.9 MG/DL (ref 0.1–1)
BILIRUB UR QL STRIP: NEGATIVE
BNP SERPL-MCNC: 482 PG/ML (ref 0–99)
BNP SERPL-MCNC: 482 PG/ML (ref 0–99)
BUN SERPL-MCNC: 40 MG/DL (ref 6–20)
BUN SERPL-MCNC: 41 MG/DL (ref 6–20)
CALCIUM SERPL-MCNC: 8.9 MG/DL (ref 8.7–10.5)
CALCIUM SERPL-MCNC: 8.9 MG/DL (ref 8.7–10.5)
CHLORIDE SERPL-SCNC: 108 MMOL/L (ref 95–110)
CHLORIDE SERPL-SCNC: 110 MMOL/L (ref 95–110)
CLARITY UR REFRACT.AUTO: CLEAR
CO2 SERPL-SCNC: 21 MMOL/L (ref 23–29)
CO2 SERPL-SCNC: 23 MMOL/L (ref 23–29)
COLOR UR AUTO: YELLOW
CREAT SERPL-MCNC: 1.1 MG/DL (ref 0.5–1.4)
CREAT SERPL-MCNC: 1.2 MG/DL (ref 0.5–1.4)
CRP SERPL-MCNC: 9.4 MG/L (ref 0–8.2)
CRP SERPL-MCNC: 9.4 MG/L (ref 0–8.2)
DIFFERENTIAL METHOD: ABNORMAL
EOSINOPHIL # BLD AUTO: 0 K/UL (ref 0–0.5)
EOSINOPHIL # BLD AUTO: 0.1 K/UL (ref 0–0.5)
EOSINOPHIL # BLD AUTO: 0.1 K/UL (ref 0–0.5)
EOSINOPHIL NFR BLD: 0.1 % (ref 0–8)
EOSINOPHIL NFR BLD: 0.4 % (ref 0–8)
EOSINOPHIL NFR BLD: 0.8 % (ref 0–8)
ERYTHROCYTE [DISTWIDTH] IN BLOOD BY AUTOMATED COUNT: 20 % (ref 11.5–14.5)
ERYTHROCYTE [DISTWIDTH] IN BLOOD BY AUTOMATED COUNT: 20.2 % (ref 11.5–14.5)
ERYTHROCYTE [DISTWIDTH] IN BLOOD BY AUTOMATED COUNT: 20.3 % (ref 11.5–14.5)
ERYTHROCYTE [DISTWIDTH] IN BLOOD BY AUTOMATED COUNT: 20.3 % (ref 11.5–14.5)
ERYTHROCYTE [DISTWIDTH] IN BLOOD BY AUTOMATED COUNT: 20.5 % (ref 11.5–14.5)
EST. GFR  (NO RACE VARIABLE): >60 ML/MIN/1.73 M^2
EST. GFR  (NO RACE VARIABLE): >60 ML/MIN/1.73 M^2
FIBRINOGEN PPP-MCNC: 602 MG/DL (ref 182–400)
GLUCOSE SERPL-MCNC: 121 MG/DL (ref 70–110)
GLUCOSE SERPL-MCNC: 57 MG/DL (ref 70–110)
GLUCOSE UR QL STRIP: ABNORMAL
HAPTOGLOB SERPL-MCNC: 149 MG/DL (ref 30–250)
HCT VFR BLD AUTO: 27.6 % (ref 40–54)
HCT VFR BLD AUTO: 27.7 % (ref 40–54)
HCT VFR BLD AUTO: 28.1 % (ref 40–54)
HCT VFR BLD AUTO: 28.5 % (ref 40–54)
HCT VFR BLD AUTO: 29 % (ref 40–54)
HGB BLD-MCNC: 7.8 G/DL (ref 14–18)
HGB BLD-MCNC: 8 G/DL (ref 14–18)
HGB UR QL STRIP: ABNORMAL
HYALINE CASTS UR QL AUTO: 0 /LPF
IMM GRANULOCYTES # BLD AUTO: 0.16 K/UL (ref 0–0.04)
IMM GRANULOCYTES # BLD AUTO: 0.18 K/UL (ref 0–0.04)
IMM GRANULOCYTES # BLD AUTO: 0.19 K/UL (ref 0–0.04)
IMM GRANULOCYTES # BLD AUTO: 0.26 K/UL (ref 0–0.04)
IMM GRANULOCYTES # BLD AUTO: 0.51 K/UL (ref 0–0.04)
IMM GRANULOCYTES NFR BLD AUTO: 1.2 % (ref 0–0.5)
IMM GRANULOCYTES NFR BLD AUTO: 1.3 % (ref 0–0.5)
IMM GRANULOCYTES NFR BLD AUTO: 1.3 % (ref 0–0.5)
IMM GRANULOCYTES NFR BLD AUTO: 1.8 % (ref 0–0.5)
IMM GRANULOCYTES NFR BLD AUTO: 3.6 % (ref 0–0.5)
INR PPP: 1 (ref 0.8–1.2)
KETONES UR QL STRIP: NEGATIVE
LDH SERPL L TO P-CCNC: 412 U/L (ref 110–260)
LEUKOCYTE ESTERASE UR QL STRIP: NEGATIVE
LYMPHOCYTES # BLD AUTO: 0.4 K/UL (ref 1–4.8)
LYMPHOCYTES # BLD AUTO: 0.5 K/UL (ref 1–4.8)
LYMPHOCYTES # BLD AUTO: 0.6 K/UL (ref 1–4.8)
LYMPHOCYTES # BLD AUTO: 0.6 K/UL (ref 1–4.8)
LYMPHOCYTES # BLD AUTO: 0.7 K/UL (ref 1–4.8)
LYMPHOCYTES NFR BLD: 2.5 % (ref 18–48)
LYMPHOCYTES NFR BLD: 3.4 % (ref 18–48)
LYMPHOCYTES NFR BLD: 4.3 % (ref 18–48)
LYMPHOCYTES NFR BLD: 4.4 % (ref 18–48)
LYMPHOCYTES NFR BLD: 5.3 % (ref 18–48)
MAGNESIUM SERPL-MCNC: 2.4 MG/DL (ref 1.6–2.6)
MAGNESIUM SERPL-MCNC: 2.5 MG/DL (ref 1.6–2.6)
MAGNESIUM SERPL-MCNC: 2.6 MG/DL (ref 1.6–2.6)
MCH RBC QN AUTO: 27.6 PG (ref 27–31)
MCH RBC QN AUTO: 27.8 PG (ref 27–31)
MCH RBC QN AUTO: 28.1 PG (ref 27–31)
MCH RBC QN AUTO: 28.4 PG (ref 27–31)
MCH RBC QN AUTO: 28.6 PG (ref 27–31)
MCHC RBC AUTO-ENTMCNC: 26.9 G/DL (ref 32–36)
MCHC RBC AUTO-ENTMCNC: 27.4 G/DL (ref 32–36)
MCHC RBC AUTO-ENTMCNC: 28.2 G/DL (ref 32–36)
MCHC RBC AUTO-ENTMCNC: 28.3 G/DL (ref 32–36)
MCHC RBC AUTO-ENTMCNC: 28.5 G/DL (ref 32–36)
MCV RBC AUTO: 100 FL (ref 82–98)
MCV RBC AUTO: 100 FL (ref 82–98)
MCV RBC AUTO: 103 FL (ref 82–98)
MCV RBC AUTO: 103 FL (ref 82–98)
MCV RBC AUTO: 99 FL (ref 82–98)
MICROSCOPIC COMMENT: NORMAL
MONOCYTES # BLD AUTO: 0.6 K/UL (ref 0.3–1)
MONOCYTES # BLD AUTO: 0.6 K/UL (ref 0.3–1)
MONOCYTES # BLD AUTO: 0.7 K/UL (ref 0.3–1)
MONOCYTES # BLD AUTO: 0.8 K/UL (ref 0.3–1)
MONOCYTES # BLD AUTO: 0.8 K/UL (ref 0.3–1)
MONOCYTES NFR BLD: 4.1 % (ref 4–15)
MONOCYTES NFR BLD: 4.4 % (ref 4–15)
MONOCYTES NFR BLD: 4.9 % (ref 4–15)
MONOCYTES NFR BLD: 5.5 % (ref 4–15)
MONOCYTES NFR BLD: 6.2 % (ref 4–15)
NEUTROPHILS # BLD AUTO: 11.4 K/UL (ref 1.8–7.7)
NEUTROPHILS # BLD AUTO: 12.3 K/UL (ref 1.8–7.7)
NEUTROPHILS # BLD AUTO: 12.3 K/UL (ref 1.8–7.7)
NEUTROPHILS # BLD AUTO: 12.6 K/UL (ref 1.8–7.7)
NEUTROPHILS # BLD AUTO: 13.5 K/UL (ref 1.8–7.7)
NEUTROPHILS NFR BLD: 86.8 % (ref 38–73)
NEUTROPHILS NFR BLD: 87.5 % (ref 38–73)
NEUTROPHILS NFR BLD: 88.4 % (ref 38–73)
NEUTROPHILS NFR BLD: 89.2 % (ref 38–73)
NEUTROPHILS NFR BLD: 91.9 % (ref 38–73)
NITRITE UR QL STRIP: NEGATIVE
NRBC BLD-RTO: 0 /100 WBC
PH UR STRIP: 6 [PH] (ref 5–8)
PHOSPHATE SERPL-MCNC: 2.2 MG/DL (ref 2.7–4.5)
PHOSPHATE SERPL-MCNC: 2.2 MG/DL (ref 2.7–4.5)
PHOSPHATE SERPL-MCNC: 2.5 MG/DL (ref 2.7–4.5)
PLATELET # BLD AUTO: 124 K/UL (ref 150–450)
PLATELET # BLD AUTO: 132 K/UL (ref 150–450)
PLATELET # BLD AUTO: 142 K/UL (ref 150–450)
PLATELET # BLD AUTO: 143 K/UL (ref 150–450)
PLATELET # BLD AUTO: 145 K/UL (ref 150–450)
PMV BLD AUTO: 12.5 FL (ref 9.2–12.9)
PMV BLD AUTO: 12.9 FL (ref 9.2–12.9)
PMV BLD AUTO: 12.9 FL (ref 9.2–12.9)
PMV BLD AUTO: 13 FL (ref 9.2–12.9)
PMV BLD AUTO: 13.2 FL (ref 9.2–12.9)
POCT GLUCOSE: 159 MG/DL (ref 70–110)
POCT GLUCOSE: 71 MG/DL (ref 70–110)
POCT GLUCOSE: 88 MG/DL (ref 70–110)
POTASSIUM SERPL-SCNC: 4.9 MMOL/L (ref 3.5–5.1)
POTASSIUM SERPL-SCNC: 5.3 MMOL/L (ref 3.5–5.1)
PREALB SERPL-MCNC: 30 MG/DL (ref 20–43)
PROT SERPL-MCNC: 6 G/DL (ref 6–8.4)
PROT SERPL-MCNC: 6 G/DL (ref 6–8.4)
PROT UR QL STRIP: ABNORMAL
PROTHROMBIN TIME: 10.4 SEC (ref 9–12.5)
RBC # BLD AUTO: 2.75 M/UL (ref 4.6–6.2)
RBC # BLD AUTO: 2.78 M/UL (ref 4.6–6.2)
RBC # BLD AUTO: 2.8 M/UL (ref 4.6–6.2)
RBC # BLD AUTO: 2.81 M/UL (ref 4.6–6.2)
RBC # BLD AUTO: 2.83 M/UL (ref 4.6–6.2)
RBC #/AREA URNS AUTO: 1 /HPF (ref 0–4)
SODIUM SERPL-SCNC: 136 MMOL/L (ref 136–145)
SODIUM SERPL-SCNC: 137 MMOL/L (ref 136–145)
SP GR UR STRIP: 1.02 (ref 1–1.03)
SQUAMOUS #/AREA URNS AUTO: 0 /HPF
T4 FREE SERPL-MCNC: 0.98 NG/DL (ref 0.71–1.51)
URN SPEC COLLECT METH UR: ABNORMAL
WBC # BLD AUTO: 13.15 K/UL (ref 3.9–12.7)
WBC # BLD AUTO: 13.77 K/UL (ref 3.9–12.7)
WBC # BLD AUTO: 14.07 K/UL (ref 3.9–12.7)
WBC # BLD AUTO: 14.24 K/UL (ref 3.9–12.7)
WBC # BLD AUTO: 14.74 K/UL (ref 3.9–12.7)
WBC #/AREA URNS AUTO: 2 /HPF (ref 0–5)

## 2022-08-19 PROCEDURE — 99900026 HC AIRWAY MAINTENANCE (STAT)

## 2022-08-19 PROCEDURE — 84134 ASSAY OF PREALBUMIN: CPT | Performed by: INTERNAL MEDICINE

## 2022-08-19 PROCEDURE — 27000248 HC VAD-ADDITIONAL DAY

## 2022-08-19 PROCEDURE — 97530 THERAPEUTIC ACTIVITIES: CPT

## 2022-08-19 PROCEDURE — 25000003 PHARM REV CODE 250

## 2022-08-19 PROCEDURE — 85610 PROTHROMBIN TIME: CPT | Mod: 91 | Performed by: STUDENT IN AN ORGANIZED HEALTH CARE EDUCATION/TRAINING PROGRAM

## 2022-08-19 PROCEDURE — 99233 PR SUBSEQUENT HOSPITAL CARE,LEVL III: ICD-10-PCS | Mod: 25,,, | Performed by: INTERNAL MEDICINE

## 2022-08-19 PROCEDURE — 84439 ASSAY OF FREE THYROXINE: CPT | Performed by: STUDENT IN AN ORGANIZED HEALTH CARE EDUCATION/TRAINING PROGRAM

## 2022-08-19 PROCEDURE — 85025 COMPLETE CBC W/AUTO DIFF WBC: CPT | Mod: 91 | Performed by: THORACIC SURGERY (CARDIOTHORACIC VASCULAR SURGERY)

## 2022-08-19 PROCEDURE — 25000003 PHARM REV CODE 250: Performed by: STUDENT IN AN ORGANIZED HEALTH CARE EDUCATION/TRAINING PROGRAM

## 2022-08-19 PROCEDURE — 85730 THROMBOPLASTIN TIME PARTIAL: CPT | Mod: 91 | Performed by: INTERNAL MEDICINE

## 2022-08-19 PROCEDURE — 99900035 HC TECH TIME PER 15 MIN (STAT)

## 2022-08-19 PROCEDURE — 86140 C-REACTIVE PROTEIN: CPT | Performed by: STUDENT IN AN ORGANIZED HEALTH CARE EDUCATION/TRAINING PROGRAM

## 2022-08-19 PROCEDURE — 27000221 HC OXYGEN, UP TO 24 HOURS

## 2022-08-19 PROCEDURE — 92526 ORAL FUNCTION THERAPY: CPT

## 2022-08-19 PROCEDURE — 83615 LACTATE (LD) (LDH) ENZYME: CPT | Performed by: INTERNAL MEDICINE

## 2022-08-19 PROCEDURE — 81001 URINALYSIS AUTO W/SCOPE: CPT | Performed by: STUDENT IN AN ORGANIZED HEALTH CARE EDUCATION/TRAINING PROGRAM

## 2022-08-19 PROCEDURE — 83735 ASSAY OF MAGNESIUM: CPT | Mod: 91 | Performed by: THORACIC SURGERY (CARDIOTHORACIC VASCULAR SURGERY)

## 2022-08-19 PROCEDURE — 97116 GAIT TRAINING THERAPY: CPT

## 2022-08-19 PROCEDURE — 25000003 PHARM REV CODE 250: Performed by: INTERNAL MEDICINE

## 2022-08-19 PROCEDURE — 99233 SBSQ HOSP IP/OBS HIGH 50: CPT | Mod: 25,,, | Performed by: INTERNAL MEDICINE

## 2022-08-19 PROCEDURE — 93750 INTERROGATION VAD IN PERSON: CPT | Mod: ,,, | Performed by: INTERNAL MEDICINE

## 2022-08-19 PROCEDURE — 80076 HEPATIC FUNCTION PANEL: CPT | Performed by: STUDENT IN AN ORGANIZED HEALTH CARE EDUCATION/TRAINING PROGRAM

## 2022-08-19 PROCEDURE — 99232 PR SUBSEQUENT HOSPITAL CARE,LEVL II: ICD-10-PCS | Mod: ,,, | Performed by: GENERAL ACUTE CARE HOSPITAL

## 2022-08-19 PROCEDURE — 84100 ASSAY OF PHOSPHORUS: CPT | Mod: 91 | Performed by: THORACIC SURGERY (CARDIOTHORACIC VASCULAR SURGERY)

## 2022-08-19 PROCEDURE — 94761 N-INVAS EAR/PLS OXIMETRY MLT: CPT

## 2022-08-19 PROCEDURE — 80053 COMPREHEN METABOLIC PANEL: CPT | Performed by: STUDENT IN AN ORGANIZED HEALTH CARE EDUCATION/TRAINING PROGRAM

## 2022-08-19 PROCEDURE — 85384 FIBRINOGEN ACTIVITY: CPT | Performed by: STUDENT IN AN ORGANIZED HEALTH CARE EDUCATION/TRAINING PROGRAM

## 2022-08-19 PROCEDURE — 63600175 PHARM REV CODE 636 W HCPCS: Performed by: STUDENT IN AN ORGANIZED HEALTH CARE EDUCATION/TRAINING PROGRAM

## 2022-08-19 PROCEDURE — 94640 AIRWAY INHALATION TREATMENT: CPT

## 2022-08-19 PROCEDURE — 83880 ASSAY OF NATRIURETIC PEPTIDE: CPT | Performed by: STUDENT IN AN ORGANIZED HEALTH CARE EDUCATION/TRAINING PROGRAM

## 2022-08-19 PROCEDURE — 25000242 PHARM REV CODE 250 ALT 637 W/ HCPCS

## 2022-08-19 PROCEDURE — 80048 BASIC METABOLIC PNL TOTAL CA: CPT | Performed by: INTERNAL MEDICINE

## 2022-08-19 PROCEDURE — 20600001 HC STEP DOWN PRIVATE ROOM

## 2022-08-19 PROCEDURE — 97605 NEG PRS WND THER DME<=50SQCM: CPT

## 2022-08-19 PROCEDURE — 85610 PROTHROMBIN TIME: CPT | Mod: 91 | Performed by: THORACIC SURGERY (CARDIOTHORACIC VASCULAR SURGERY)

## 2022-08-19 PROCEDURE — 99232 SBSQ HOSP IP/OBS MODERATE 35: CPT | Mod: ,,, | Performed by: GENERAL ACUTE CARE HOSPITAL

## 2022-08-19 PROCEDURE — 93750 PR INTERROGATE VENT ASSIST DEV, IN PERSON, W PHYSICIAN ANALYSIS: ICD-10-PCS | Mod: ,,, | Performed by: INTERNAL MEDICINE

## 2022-08-19 PROCEDURE — 83010 ASSAY OF HAPTOGLOBIN QUANT: CPT | Performed by: STUDENT IN AN ORGANIZED HEALTH CARE EDUCATION/TRAINING PROGRAM

## 2022-08-19 PROCEDURE — 85025 COMPLETE CBC W/AUTO DIFF WBC: CPT | Mod: 91 | Performed by: STUDENT IN AN ORGANIZED HEALTH CARE EDUCATION/TRAINING PROGRAM

## 2022-08-19 RX ORDER — WARFARIN 2.5 MG/1
2.5 TABLET ORAL
Status: DISCONTINUED | OUTPATIENT
Start: 2022-08-20 | End: 2022-08-21

## 2022-08-19 RX ORDER — LACTULOSE 10 G/15ML
30 SOLUTION ORAL ONCE
Status: COMPLETED | OUTPATIENT
Start: 2022-08-19 | End: 2022-08-19

## 2022-08-19 RX ORDER — WARFARIN SODIUM 5 MG/1
5 TABLET ORAL
Status: DISCONTINUED | OUTPATIENT
Start: 2022-08-19 | End: 2022-08-21

## 2022-08-19 RX ORDER — AMOXICILLIN 250 MG
1 CAPSULE ORAL DAILY
Status: DISCONTINUED | OUTPATIENT
Start: 2022-08-19 | End: 2022-09-01 | Stop reason: HOSPADM

## 2022-08-19 RX ADMIN — MEXILETINE HYDROCHLORIDE 400 MG: 200 CAPSULE ORAL at 04:08

## 2022-08-19 RX ADMIN — HEPARIN SODIUM 23 UNITS/KG/HR: 5000 INJECTION INTRAVENOUS; SUBCUTANEOUS at 12:08

## 2022-08-19 RX ADMIN — WARFARIN SODIUM 5 MG: 5 TABLET ORAL at 06:08

## 2022-08-19 RX ADMIN — PANTOPRAZOLE SODIUM 40 MG: 40 GRANULE, DELAYED RELEASE ORAL at 08:08

## 2022-08-19 RX ADMIN — LACTULOSE 30 G: 20 SOLUTION ORAL at 06:08

## 2022-08-19 RX ADMIN — BUSPIRONE HYDROCHLORIDE 5 MG: 5 TABLET ORAL at 04:08

## 2022-08-19 RX ADMIN — BUSPIRONE HYDROCHLORIDE 5 MG: 5 TABLET ORAL at 08:08

## 2022-08-19 RX ADMIN — LEVALBUTEROL HYDROCHLORIDE 0.63 MG: 0.63 SOLUTION RESPIRATORY (INHALATION) at 08:08

## 2022-08-19 RX ADMIN — LEVALBUTEROL HYDROCHLORIDE 0.63 MG: 0.63 SOLUTION RESPIRATORY (INHALATION) at 12:08

## 2022-08-19 RX ADMIN — BUSPIRONE HYDROCHLORIDE 5 MG: 5 TABLET ORAL at 09:08

## 2022-08-19 RX ADMIN — Medication 400 MG: at 08:08

## 2022-08-19 RX ADMIN — Medication 400 MG: at 09:08

## 2022-08-19 RX ADMIN — METHIMAZOLE 5 MG: 5 TABLET ORAL at 08:08

## 2022-08-19 RX ADMIN — LEVALBUTEROL HYDROCHLORIDE 0.63 MG: 0.63 SOLUTION RESPIRATORY (INHALATION) at 01:08

## 2022-08-19 RX ADMIN — PREDNISONE 20 MG: 20 TABLET ORAL at 08:08

## 2022-08-19 RX ADMIN — DOCUSATE SODIUM 100 MG: 50 LIQUID ORAL at 08:08

## 2022-08-19 RX ADMIN — ASPIRIN 81 MG CHEWABLE TABLET 81 MG: 81 TABLET CHEWABLE at 08:08

## 2022-08-19 RX ADMIN — MIRTAZAPINE 15 MG: 15 TABLET, FILM COATED ORAL at 09:08

## 2022-08-19 RX ADMIN — HEPARIN SODIUM 23 UNITS/KG/HR: 5000 INJECTION INTRAVENOUS; SUBCUTANEOUS at 01:08

## 2022-08-19 RX ADMIN — MEXILETINE HYDROCHLORIDE 400 MG: 200 CAPSULE ORAL at 05:08

## 2022-08-19 RX ADMIN — AMIODARONE HYDROCHLORIDE 400 MG: 200 TABLET ORAL at 08:08

## 2022-08-19 RX ADMIN — MEXILETINE HYDROCHLORIDE 400 MG: 200 CAPSULE ORAL at 09:08

## 2022-08-19 NOTE — CARE UPDATE
"RAPID RESPONSE NURSE AI ALERT       AI alert received.    Chart Reviewed: 08/19/2022, 1:06 AM    MRN: [id number]  Bed: 311/311 A    Dx: Left ventricular assist device (LVAD) complication    Tim Richards has a past medical history of Anticoagulant long-term use, CHF (congestive heart failure), Class 1 obesity due to excess calories with serious comorbidity and body mass index (BMI) of 31.0 to 31.9 in adult, Dilated cardiomyopathy, Disorder of kidney and ureter, Encounter for blood transfusion, Gout, HTN (hypertension), psychiatric care, ICD (implantable cardioverter-defibrillator) infection, Psychiatric problem, Thyroid disease, and Ventricular tachycardia (paroxysmal).    Last VS: BP (!) 76/0 (BP Location: Left arm, Patient Position: Lying)   Pulse (!) 49   Temp 97.1 °F (36.2 °C) (Oral)   Resp 20   Ht 6' 1" (1.854 m)   Wt 87.4 kg (192 lb 10.9 oz)   SpO2 (!) 92%   BMI 25.42 kg/m²     24H Vital Sign Range:  Temp:  [97.1 °F (36.2 °C)-98 °F (36.7 °C)]   Pulse:  []   Resp:  [17-25]   BP: (66-76)/(0)   SpO2:  [90 %-99 %]     Level of Consciousness (AVPU): alert    Recent Labs     08/18/22  0841 08/18/22  1212 08/18/22 2043   WBC 16.46* 17.28* 14.43*   HGB 8.5* 8.8* 9.1*   HCT 30.1* 31.5* 33.5*   * 152 160       Recent Labs     08/17/22  0757 08/17/22  1150 08/17/22 2024 08/18/22  0841 08/18/22  1212 08/18/22  2043    141  --   --  136  --    K 4.0  4.0 4.8  --   --  5.0  --    * 113*  --   --  111*  --    CO2 19* 20*  --   --  17*  --    CREATININE 1.2 1.4  --   --  1.3  --    * 146*  --   --  138*  --    PHOS  --  2.4*   < > 1.9* 2.0* 2.2*   MG  --  2.8*   < > 2.7* 2.7* 2.5    < > = values in this interval not displayed.        Recent Labs     08/16/22  0441   PH 7.361   PCO2 40.3   PO2 79*   HCO3 22.8*   POCSATURATED 95   BE -3        OXYGEN:  Flow (L/min): 8  Oxygen Concentration (%): 35  O2 Device (Oxygen Therapy): Trach Collar    MEWS score: 2    Bedside RN, Sarah " contacted. No concerns verbalized at this time. Instructed to call [phone] for further concerns or assistance.    Cassy Rivera RN

## 2022-08-19 NOTE — ASSESSMENT & PLAN NOTE
In pts on amiodarone and warfarin, amiodarone-induced thyroid dysfunction can significantly influence warfarin response. The effect of warfarin is potentiated by thyrotoxicosis and attenuated in hypothyroidism. Close monitoring of INR recommended.

## 2022-08-19 NOTE — CONSULTS
RD consulted for nutritional assessment/ ONS supplements. Please see full note from 8/15/22. RD following     8/19: RD provided thickener packets to nurse per Speech recommendations. Spoke w/ pt at bedside and encouraged ONS/full liquid nectar thick diet consumption for optimization of protein and calorie intake at this time. Pt agreeable.    Please find updated recs provided below.    Recommendations:  1. Diet advancement per Speech recommendations  2. Continue Boost Plus TID (w/ thickener provided) for optimization of protein and calorie intake   3. RD following

## 2022-08-19 NOTE — PLAN OF CARE
Problem: Adult Inpatient Plan of Care  Goal: Patient-Specific Goal (Individualized)  Outcome: Ongoing, Progressing  Flowsheets (Taken 8/18/2022 1925)  Anxieties, Fears or Concerns: None voiced  Individualized Care Needs:   Monitor SPO2. Monitor LVAd#. Perform LVAD dressing change   due 08/19. NG tube D/C 08/18.  Patient-Specific Goals (Include Timeframe): Pt will be free of falls and injuries.

## 2022-08-19 NOTE — PROGRESS NOTES
08/19/2022  Casper Harris    Current provider:  Amy Rhodes MD    Device interrogation:  TXP LVAD INTERROGATIONS 8/19/2022 8/19/2022 8/18/2022 8/18/2022 8/18/2022 8/18/2022 8/18/2022   Type HeartMate3 HeartMate3 HeartMate3 HeartMate3 HeartMate3 HeartMate3 HeartMate3   Flow 5.2 5.3 5.4 5.5 5.3 5.2 5.5   Speed 6300 6300 6300 6300 6300 6300 6300   PI 3.7 3.5 3.1 3.3 3.6 3.6 3.0   Power ([place]) 5.3 5.2 5.3 5.3 5.3 5.3 5.3   LSL - - 5900 - - 5900 5900   Low Flow Alarm - - - - - - -   Pulsatility No Pulse No Pulse No Pulse - - No Pulse No Pulse          Rounded on Tim Richards to ensure all mechanical assist device settings (IABP or VAD) were appropriate and all parameters were within limits.  I was able to ensure all back up equipment was present, the staff had no issues, and the Perfusion Department daily rounding was complete.      For implantable VADs: Interrogation of Ventricular assist device was performed with analysis of device parameters and review of device function. I have personally reviewed the interrogation findings and agree with findings as stated.     In emergency, the nursing units have been notified to contact the perfusion department either by:  Calling [phone] from 630am to 4pm Mon thru Fri, utilizing the On-Call Finder functionality of Epic and searching for Perfusion, or by contacting the hospital  from 4pm to 630am and on weekends and asking to speak with the perfusionist on call.    7:04 AM

## 2022-08-19 NOTE — ASSESSMENT & PLAN NOTE
Key History and Diagnostic Findings  - History of AIT type 2 (TRAb and TSI negative; Thyroid US unremarkable with low vascularity)  - Weaned off methimazole and prednisone by May 2020, then developed hypothyroidism, Levothyroxine started and dose titrated per TFTs. Prior to current admission he was on Levothyroxine 112 mcg daily  - Labs consistent with hypothyroidism until current admission, initially on 7/13, with low TSH and elevated fT4. Repeat TFTs on 7/27 with worsening hyperthyroidism. He continued to receive LT4 112 mcg through 7/28. He remains on amiodarone for episodes of Ventricular Tachycardia  - Suspect current hyperthyroidism is likely due to AIT-2, however continued exogenous LT4 certainly contribute to current presentation   - fT4 now normal    Lab Results   Component Value Date    TSH 0.127 (L) 08/14/2022    TSH <0.010 (L) 07/27/2022    TSH 0.111 (L) 07/13/2022    FREET4 0.98 08/19/2022    FREET4 1.24 08/16/2022    FREET4 1.46 08/15/2022     Plan  - Strongly suspect AIT type 2 with improvement seen currently with the use of prednisone.   - Heparin can cause a false elevation in free T4 as it displaces free T4 from TBG; will follow-up direct dialysis result collected 8.5.22 -- lab was cancelled by receiving lab as sample insufficient to perform lab   - Will continue methimazole 5mg daily and prednisone 20 mg daily and re-evaluate outpatient  - Decrease prednisone to 20 mg qd on 8/17 - will continue this dose at d/c and re-evaluate outpatient   - fT4 8/19 continues to be in lower half of normal range on methimazole 5mg qd and prednisone 20mg qd

## 2022-08-19 NOTE — SUBJECTIVE & OBJECTIVE
"Interval HPI:   Overnight events: Had head CT o/n for unequal pupils per RN. CT head was neg and pt was asymptomatic without neuro deficits.  Eating:   <25%  Nausea: No  Hypoglycemia and intervention: Yes - BG 57 x1, no correction needed, has not been requiring correction insulin.  Fever: No  TPN and/or TF: not currently  If yes, type of TF/TPN and rate: dietary supplements and SLP assessing for po diet     BP (!) 74/0 (BP Location: Left arm, Patient Position: Lying)   Pulse (!) 57   Temp 98.2 °F (36.8 °C)   Resp 16   Ht 6' 1" (1.854 m)   Wt 87.4 kg (192 lb 10.9 oz)   SpO2 95%   BMI 25.42 kg/m²     Labs Reviewed and Include    Recent Labs   Lab 08/19/22  1027   *   CALCIUM 8.9   ALBUMIN 2.2*   PROT 6.0      K 4.9   CO2 23      BUN 40*   CREATININE 1.2   ALKPHOS 239*   ALT 71*   AST 47*   BILITOT 1.9*     Lab Results   Component Value Date    WBC 14.24 (H) 08/19/2022    HGB 7.8 (L) 08/19/2022    HCT 29.0 (L) 08/19/2022     (H) 08/19/2022     (L) 08/19/2022     Recent Labs   Lab 08/14/22  1936 08/15/22  0346 08/16/22  0342 08/19/22  0523   TSH 0.127*  --   --   --    FREET4 1.39   < > 1.24 0.98    < > = values in this interval not displayed.     Lab Results   Component Value Date    HGBA1C 5.4 04/26/2021       Nutritional status:   Body mass index is 25.42 kg/m².  Lab Results   Component Value Date    ALBUMIN 2.2 (L) 08/19/2022    ALBUMIN 2.1 (L) 08/19/2022    ALBUMIN 2.3 (L) 08/18/2022     Lab Results   Component Value Date    PREALBUMIN 30 08/19/2022    PREALBUMIN 32 08/11/2022    PREALBUMIN 28 08/05/2022       Estimated Creatinine Clearance: 78.6 mL/min (based on SCr of 1.2 mg/dL).    Accu-Checks  Recent Labs     08/17/22  1149 08/17/22  1615 08/17/22  2153 08/18/22  0044 08/18/22  0706 08/18/22  1121 08/18/22  1533 08/18/22  1939 08/19/22  0756 08/19/22  1146   POCTGLUCOSE 181* 162* 118* 95 108 153* 134* 171* 71 88       Current Medications and/or Treatments Impacting " Glycemic Control  Immunotherapy:    Immunosuppressants       None          Steroids:   Hormones (From admission, onward)                Start     Stop Route Frequency Ordered    08/19/22 0900  predniSONE tablet 20 mg         -- Oral Daily 08/18/22 1620    08/02/22 1931  melatonin tablet 6 mg         -- OG Nightly PRN 08/02/22 1832          Pressors:    Autonomic Drugs (From admission, onward)                Start     Stop Route Frequency Ordered    07/29/22 0011  EPINEPHrine (ADRENALIN) 1 mg/mL injection        Note to Pharmacy: Created by cabinet override    07/29 1214   07/29/22 0011          Hyperglycemia/Diabetes Medications:   Antihyperglycemics (From admission, onward)                Start     Stop Route Frequency Ordered    07/30/22 1023  insulin aspart U-100 pen 0-5 Units         -- SubQ Every 4 hours PRN 07/30/22 0925

## 2022-08-19 NOTE — CARE UPDATE
RAPID RESPONSE NURSE PROACTIVE ROUNDING NOTE       Time of Visit:     Admit Date: 2022  LOS: 53  Code Status: Full Code   Date of Visit: 2022  : 1965  Age: 56 y.o.  Sex: male  Race: Black or   Bed: 311/311 A:   MRN: [id number]  Was the patient discharged from an ICU this admission? Yes   Was the patient discharged from a PACU within last 24 hours? No   Did the patient receive conscious sedation/general anesthesia in last 24 hours? No  Was the patient in the ED within the past 24 hours? No  Was the patient on NIPPV within the past 24 hours? No   Attending Physician: Amy Rhodes MD  Primary Service: [facility] HEART TRANSPLANT TEAM 1   Time spent at the bedside: < 15 min    SITUATION    Notified by charge RN during charge round  Reason for alert: unequal pupils  Called to evaluate the patient for Neuro    BACKGROUND     Why is the patient in the hospital?: Left ventricular assist device (LVAD) complication    Patient has a past medical history of Anticoagulant long-term use, CHF (congestive heart failure), Class 1 obesity due to excess calories with serious comorbidity and body mass index (BMI) of 31.0 to 31.9 in adult, Dilated cardiomyopathy, Disorder of kidney and ureter, Encounter for blood transfusion, Gout, HTN (hypertension), psychiatric care, ICD (implantable cardioverter-defibrillator) infection, Psychiatric problem, Thyroid disease, and Ventricular tachycardia (paroxysmal).    Last Vitals:  Temp: 97.1 °F (36.2 °C) ( 1534)  Pulse: 49 (1946)  Resp: 20 (1946)  BP: 76/0 ( 1605)  SpO2: 92 % (1946)    24 Hours Vitals Range:  Temp:  [97.1 °F (36.2 °C)-98 °F (36.7 °C)]   Pulse:  []   Resp:  [17-25]   BP: (66-76)/(0)   SpO2:  [90 %-99 %]     Labs:  Recent Labs     22  0841 22  1212 22  2043   WBC 16.46* 17.28* 14.43*   HGB 8.5* 8.8* 9.1*   HCT 30.1* 31.5* 33.5*   * 152 160       Recent Labs     22  0757 22  1154  08/17/22 2024 08/18/22  0841 08/18/22  1212 08/18/22 2043    141  --   --  136  --    K 4.0  4.0 4.8  --   --  5.0  --    * 113*  --   --  111*  --    CO2 19* 20*  --   --  17*  --    CREATININE 1.2 1.4  --   --  1.3  --    * 146*  --   --  138*  --    PHOS  --  2.4*   < > 1.9* 2.0* 2.2*   MG  --  2.8*   < > 2.7* 2.7* 2.5    < > = values in this interval not displayed.        Recent Labs     08/16/22  0441   PH 7.361   PCO2 40.3   PO2 79*   HCO3 22.8*   POCSATURATED 95   BE -3        ASSESSMENT    Physical Exam  Eyes:      Extraocular Movements: Extraocular movements intact.      Conjunctiva/sclera: Conjunctivae normal.      Pupils: Pupils are unequal.      Comments: Left pupil 4 and brisk Right pupil 2 and brisk.   Cardiovascular:      Pulses: Decreased pulses.      Comments: LVAD  Pulmonary:      Effort: Pulmonary effort is normal.      Comments: Trach collar  Neurological:      General: No focal deficit present.      Mental Status: He is alert.      GCS: GCS eye subscore is 4. GCS verbal subscore is 1. GCS motor subscore is 6.      Sensory: Sensation is intact.         INTERVENTIONS    The patient was seen for Neurological problem. Staff concerns included unequal pupils. The following interventions were performed: CT scan ordered.    RECOMMENDATIONS    Follow up on Ct head scan. Monitoring for any neuro deficits/change in MS    PROVIDER ESCALATION    Yes/No  yes    Orders received and case discussed with Dr. Sera Huddleston.    Disposition: Remain in room 311.    FOLLOW-UP    bedside RNSarah updated on plan of care. Instructed to call the Rapid Response Nurse, Santos Gilbert RN at [phone] for additional questions or concerns.

## 2022-08-19 NOTE — PT/OT/SLP PROGRESS
Speech Language Pathology Treatment    Patient Name:  Tim Richards   MRN:  [id number]  Admitting Diagnosis: Left ventricular assist device (LVAD) complication    Recommendations:                 General Recommendations:  Dysphagia therapy and Modified barium swallow study    Diet recommendations:     Liquid Diet Level: Nectar Thick liquids including Boost  1pkt to 6oz     · Full oral diet of BOOST as per nutrition or MD recs with goal to remove Ng tube   · Boost MUST also be thickened to NECTAR thick   · No solids, purees or thin liquids at this time    Ok for ice chips for the purpose for completing swallowing exercises   2-3 x daily complete the following   1. Effortful swallow x10  2. Falsetto /i/ x10  3. Ro Maneuver x3    General Precautions: Standard, fall, LVAD, sternal  Communication strategies:  One way speaking valve    Subjective     Awake with RT upon arrival   RN in agreement with seeing pt at this time     Pain/Comfort:  Pain Rating 1: 0/10  Pain Rating Post-Intervention 1: 0/10    Respiratory Status: trach collar     Objective:     Has the patient been evaluated by SLP for swallowing?   Yes  Keep patient NPO? No     Pt upright awake and alert in bed. Pt receiving RT treatment upon arrival. SLP placed speaking valve and pt tolerating without difficulty.  NG tube  Since removed and pt reporting throat feeling more comfortable. Pt was observed to tolerate 3oz of nectar thick liquids without difficulty. SLP reviewed with pt and RN at th bedside ongoing plan until repeat MBS completed Monday. SLP demonstrated to pt how to achieve nectar thick consistency and to continue to rlyy on nectar thick nutritious beverages (such as Boost) for PO intake. Pt , RN and team with good understanding that he must meet his needs via nectar thick boost by mouth while continuing with functional swallowing practice. SLP communicated with pt and team recommendations and updated whiteboard. All parties in agreement with  plan. Pt with home exercise swallow program at the bedside within reach and reports completing multiple times daily. SLP left pt with speaking valve in place and HOB elevated position in bed with call light within reach.  SLP will follow up.    Assessment:     Tim Richards is a 56 y.o. male with an SLP diagnosis of Dysphagia.      Goals:   Multidisciplinary Problems     SLP Goals        Problem: SLP    Goal Priority Disciplines Outcome   SLP Goal     SLP Ongoing, Progressing   Description: Speech Language Pathology Goals  Goals expected to be met by 8/18    1.Pt will participate in more objective swallow assessment via MBSS to help determine least restrictive diet   2. . Pt will tolerate PMSV for waking hours to increase phonation, respiration and swallowing aspects  3. Pt/family will don/doff PMSV x1 during session with min cues      Speech Language Pathology Goals  Goals expected to be met by 8/25/22    1. Pt will tolerate full nectar thick liquid diet without overt clinical signs of aspiration   2. Pt will participate in repeat MBS to help determine least restrictive diet   3. Pt will continue to tolerate PMSV for all waking hours                            Plan:     · Patient to be seen:  4 x/week   · Plan of Care reviewed with:  patient   · SLP Follow-Up:  Yes       Discharge recommendations:  rehabilitation facility     Time Tracking:     SLP Treatment Date:   08/19/22  Speech Start Time:  0840  Speech Stop Time:  0852     Speech Total Time (min):  12 min    Billable Minutes: Treatment Swallowing Dysfunction 12 08/19/2022

## 2022-08-19 NOTE — ASSESSMENT & PLAN NOTE
Careful insulin titration with impaired renal function, although not requiring insulin regularly now that steroid dose has been decreased

## 2022-08-19 NOTE — AI DETERIORATION ALERT
RAPID RESPONSE NURSE ROUND       Rounding completed with charge RNGarry for AI alert reports no changes at this time. No additional concerns verbalized at this time. Instructed to call [phone] for further concerns or assistance.

## 2022-08-19 NOTE — PLAN OF CARE
On coming assessment pt. With unequal pupils. Left 5, right 3-  pt. With no complaints of headache, no neuro deficits- HTS paged and informed of findings. order for STAT head CT and turn off cont. IV heparin until further notice. CT negative, heparin resumed with next PTT order placed for 0800.  VAD with stable flows throughout shift and no alarms.   Pt. Suctioned via trach multiple times throughout shift.  All oral fluids thickened- no coughilllDDD  Pt. Voiding via urinal x3 - tea/[place] color.    Pt. Taken off speciality bed due to malfunction. Sacrum intact/no redness. Foam dressing in place.     Problem: Adult Inpatient Plan of Care  Goal: Plan of Care Review  Outcome: Ongoing, Progressing  Goal: Patient-Specific Goal (Individualized)  Outcome: Ongoing, Progressing  Goal: Absence of Hospital-Acquired Illness or Injury  Outcome: Ongoing, Progressing  Goal: Optimal Comfort and Wellbeing  Outcome: Ongoing, Progressing  Goal: Readiness for Transition of Care  Outcome: Ongoing, Progressing     Problem: Infection  Goal: Absence of Infection Signs and Symptoms  Outcome: Ongoing, Progressing     Problem: Neutropenia  Goal: Absence of Infection  Outcome: Ongoing, Progressing     Problem: Fall Injury Risk  Goal: Absence of Fall and Fall-Related Injury  Outcome: Ongoing, Progressing     Problem: Skin Injury Risk Increased  Goal: Skin Health and Integrity  Outcome: Ongoing, Progressing     Problem: Adjustment to Device (Ventricular Assist Device)  Goal: Optimal Adjustment to Device  Outcome: Ongoing, Progressing     Problem: Bleeding (Ventricular Assist Device)  Goal: Absence of Bleeding  Outcome: Ongoing, Progressing     Problem: Embolism (Ventricular Assist Device)  Goal: Absence of Embolism Signs and Symptoms  Outcome: Ongoing, Progressing     Problem: Hemodynamic Instability (Ventricular Assist Device)  Goal: Optimal Blood Flow  Outcome: Ongoing, Progressing     Problem: Infection (Ventricular Assist Device)  Goal:  Absence of Infection Signs and Symptoms  Outcome: Ongoing, Progressing     Problem: Right-Sided Heart Compromise (Ventricular Assist Device)  Goal: Effective Right-Sided Heart Function  Outcome: Ongoing, Progressing     Problem: Coping Ineffective  Goal: Effective Coping  Outcome: Ongoing, Progressing     Problem: Communication Impairment (Mechanical Ventilation, Invasive)  Goal: Effective Communication  Outcome: Ongoing, Progressing     Problem: Device-Related Complication Risk (Mechanical Ventilation, Invasive)  Goal: Optimal Device Function  Outcome: Ongoing, Progressing     Problem: Inability to Wean (Mechanical Ventilation, Invasive)  Goal: Mechanical Ventilation Liberation  Outcome: Ongoing, Progressing     Problem: Nutrition Impairment (Mechanical Ventilation, Invasive)  Goal: Optimal Nutrition Delivery  Outcome: Ongoing, Progressing     Problem: Skin and Tissue Injury (Mechanical Ventilation, Invasive)  Goal: Absence of Device-Related Skin and Tissue Injury  Outcome: Ongoing, Progressing     Problem: Ventilator-Induced Lung Injury (Mechanical Ventilation, Invasive)  Goal: Absence of Ventilator-Induced Lung Injury  Outcome: Ongoing, Progressing     Problem: Communication Impairment (Artificial Airway)  Goal: Effective Communication  Outcome: Ongoing, Progressing     Problem: Device-Related Complication Risk (Artificial Airway)  Goal: Optimal Device Function  Outcome: Ongoing, Progressing     Problem: Skin and Tissue Injury (Artificial Airway)  Goal: Absence of Device-Related Skin or Tissue Injury  Outcome: Ongoing, Progressing     Problem: Noninvasive Ventilation Acute  Goal: Effective Unassisted Ventilation and Oxygenation  Outcome: Ongoing, Progressing     Problem: Fluid and Electrolyte Imbalance (Acute Kidney Injury/Impairment)  Goal: Fluid and Electrolyte Balance  Outcome: Ongoing, Progressing     Problem: Oral Intake Inadequate (Acute Kidney Injury/Impairment)  Goal: Optimal Nutrition Intake  Outcome:  Ongoing, Progressing     Problem: Renal Function Impairment (Acute Kidney Injury/Impairment)  Goal: Effective Renal Function  Outcome: Ongoing, Progressing     Problem: Impaired Wound Healing  Goal: Optimal Wound Healing  Outcome: Ongoing, Progressing

## 2022-08-19 NOTE — NURSING
Called Dr. Enciso with HTS to inform of pt. Unequal pupils, left 5, right 3. No neuro deficits, no vision changes.

## 2022-08-19 NOTE — PROGRESS NOTES
[facility] - Cardiology Stepdown  Wound Care    Patient Name:  Tim Richards   MRN:  [id number]  Date: 2022  Diagnosis: Left ventricular assist device (LVAD) complication    History:     Past Medical History:   Diagnosis Date    Anticoagulant long-term use     CHF (congestive heart failure)     Class 1 obesity due to excess calories with serious comorbidity and body mass index (BMI) of 31.0 to 31.9 in adult     Dilated cardiomyopathy 1/10/2018    Disorder of kidney and ureter     CKD    Encounter for blood transfusion     Gout     HTN (hypertension)     Hx of psychiatric care     ICD (implantable cardioverter-defibrillator) infection 2020    Psychiatric problem     Thyroid disease     Ventricular tachycardia (paroxysmal)        Social History     Socioeconomic History    Marital status:     Number of children: 3   Occupational History     Employer: [employer]    Tobacco Use    Smoking status: Former Smoker     Packs/day: 1.00     Years: 31.00     Pack years: 31.00     Types: Cigarettes     Quit date: 2018     Years since quittin.6    Smokeless tobacco: Never Used    Tobacco comment: pt is quiting on his own - pt stated not qualified for program;  pt  quit on his own   Substance and Sexual Activity    Alcohol use: No     Alcohol/week: 0.0 standard drinks     Comment: quit    Drug use: No    Sexual activity: Yes     Partners: Female     Birth control/protection: None     Comment: 10/5/17  with same partner 7 years    Social History Narrative    Disabled       Precautions:     Allergies as of 2022 - Reviewed 2022   Allergen Reaction Noted    Lisinopril Anaphylaxis 2017    Hydralazine analogues  2020       [facility] Assessment Details/Treatment     Patient seen for wound care follow up. Patient with wound to right groin that was followed last week s/p wound vac removal by SICU nurse. Following up today as last week the wound bed was yellow and there was still  some hematoma remaining. Today the wound bed is much  with a pink base and only hematoma to the medial edge over a white spiral device that is unknown to me. After discussing photo of wound with Dr. Tejada and Dr. English, this is the patients graft site and is ok to vac the wound using white granufoam. Will discuss tomorrow further with the team and place wound vac if desired and using caution to protect the graft site. Patient tolerated dressing change well. Will follow up tomorrow. Notified nursing.                  08/18/22 1614   WOCN Assessment   WOCN Total Time (mins) 45   Visit Date 08/18/22   Visit Time 1614   Consult Type Follow Up   WOCN Speciality Wound   Intervention assessed;changed;chart review;coordination of care;team conference        Incision/Site 07/13/22 2028 Right Groin   Date First Assessed/Time First Assessed: 07/13/22 2028   Side: Right  Location: Groin   Wound Image     Dressing Appearance Intact;Moist drainage   Drainage Amount Moderate   Drainage Characteristics/Odor Serous;Yellow;Clear   Appearance Pink;Yellow;Moist;Other (see comments)  (graft exposed)   Red (%), Wound Tissue Color 90 %   Yellow (%), Wound Tissue Color 10 %   Periwound Area Intact   Wound Edges Open   Care Cleansed with:;Sterile normal saline   Dressing Changed   Packing packing removed;gauze, iodoform   Packing Inserted  1   Packing Removed 1   Periwound Care Dry periwound area maintained   Dressing Change Due 08/18/22

## 2022-08-19 NOTE — PT/OT/SLP PROGRESS
Physical Therapy Treatment and Co-Treatment    Patient Name:  Tim Richards   MRN:  [id number]    Recommendations:     Discharge Recommendations:  rehabilitation facility   Discharge Equipment Recommendations:  (TBD)   Barriers to discharge: None    Co-treatment with OT to address patient assist levels and potential intolerance to two therapy sessions.  Assessment:     Tim Richards is a 56 y.o. male admitted with a medical diagnosis of Left ventricular assist device (LVAD) complication.  He presents with the following impairments/functional limitations:  weakness, impaired endurance, impaired self care skills, impaired functional mobility, gait instability, impaired balance, decreased coordination, decreased lower extremity function. Pt performed bed mobility with SBA and transfers/gait with mod-max x 2 persons. Pt reports generalized pain and dizziness with position changes throughout session, with symptoms relieved with deep breathing and ankle pumps.  Pt performed 3 steps anteriorly/posteriorly and 4 left lateral steps with maximum assistance of 2 persons with hand held assistance. Gait presents with narrow BILL, decreased step length, instability, and kyphotic posture. Pt required verbal and tactile cueing to maintain upright posturing and sequencing of gait.  Pt required seated rest break between A/P > lateral steps 2/2 pt fatigue. Pt is functioning below baseline and continues to be a good candidate for rehab once medically stable.      Rehab Prognosis: Good; patient would benefit from acute skilled PT services to address these deficits and reach maximum level of function.    Recent Surgery: Procedure(s) (LRB):  CREATION, TRACHEOSTOMY (N/A)  INSERTION, CENTRAL VENOUS ACCESS DEVICE 15 Days Post-Op    Plan:     During this hospitalization, patient to be seen 5 x/week to address the identified rehab impairments via gait training, therapeutic activities, therapeutic exercises, neuromuscular re-education and  "progress toward the following goals:    · Plan of Care Expires:  08/21/22    Subjective     Chief Complaint: Decreased functional mobility  Patient/Family Comments/goals: "I haven't slept"  Pain/Comfort:  · Pain Rating 1:  (not rated)  · Location - Orientation 1: generalized  · Pain Addressed 1: Reposition  · Pain Rating Post-Intervention 1:  (not rated)      Objective:     Communicated with RN prior to session.  Patient found supine with peripheral IV, pulse ox (continuous), telemetry, LVAD upon PT entry to room.     General Precautions: Standard, fall, LVAD, sternal   Orthopedic Precautions:N/A   Braces: N/A  Respiratory Status: Room air     Functional Mobility:  · Bed Mobility:     · Rolling Left:  stand by assistance  · Scooting: stand by assistance  · Supine to Sit: stand by assistance  · Sit to Supine: stand by assistance  · Transfers:     · Sit to Stand:  · #1  Moderate assistance x 2 persons with BLE blocking and verbal/tactile cueing for upright posturing. RW used for pt balance  · #2  Maximum assistance x 2 persons with BLE blocking and verbal/tactile cueing for upright posturing. RW used for pt balance  · #3  Moderate assistance x 2 persons with BLE blocking and verbal/tactile cueing for upright posturing. RW used for pt balance  · Stand to Sit:  · #1  Moderate assistance x 2 persons with BLE blocking and verbal/tactile cueing for controlled descent.   · #2  Maximum assistance x 2 persons with BLE blocking and verbal/tactile cueing for  controlled descent  · #3  Moderate assistance x 2 persons with BLE blocking and verbal/tactile cueing for  controlled descent.    · Gait: Pt performed 3 steps anteriorly/posteriorly and 4 left lateral steps with maximum assistance of 2 persons with hand held assistance. Gait presents with narrow BILL, decreased step length, instability, and kyphotic posture. Pt required verbal and tactile cueing to maintain upright posturing and sequencing of gait.  Pt required seated rest " break between A/P > lateral steps.      AM-PAC 6 CLICK MOBILITY  Turning over in bed (including adjusting bedclothes, sheets and blankets)?: 3  Sitting down on and standing up from a chair with arms (e.g., wheelchair, bedside commode, etc.): 2  Moving from lying on back to sitting on the side of the bed?: 3  Moving to and from a bed to a chair (including a wheelchair)?: 2  Need to walk in hospital room?: 2  Climbing 3-5 steps with a railing?: 1  Basic Mobility Total Score: 13       Therapeutic Activities and Exercises:   Pt performed static sitting balance with supervision EOB while donning [facility] gown. Pt performed static standing balance EOB with maximum assistance x 2 persons with BLE blocking while EOB using RW for balance. Pt performed anterior, posterior, and lateral stepping with maximum assistance x 2 persons with BLE blocking.    Pt educated on role of PT and POC.    Patient left supine with all lines intact and call button in reach..    GOALS:   Multidisciplinary Problems     Physical Therapy Goals        Problem: Physical Therapy    Goal Priority Disciplines Outcome Goal Variances Interventions   Physical Therapy Goal     PT, PT/OT Ongoing, Progressing     Description: Goals to be met by: 2022    Patient will increase functional independence with mobility by performin. Supine to sit with MInimal Assistance -not met  2. Sit to stand transfer with Contact Guard Assistance -not met  3. Gait  x 150 feet with Contact Guard Assistance with approved assistive device -not met  4. Ascend/descend 8 stair with right Handrails Contact Guard Assistance -not met  5. Sitting at edge of bed x15 minutes with Contact Guard Assistance to improve tolerance to activities. -not met  6. Lower extremity exercise program x15 reps with assistance as needed -not met               Multidisciplinary Problems (Resolved)        Problem: Physical Therapy    Goal Priority Disciplines Outcome Goal Variances Interventions    Physical Therapy Goal   (Resolved)     PT, PT/OT Met     Description: The patient is near his functional baseline, he ambulated within the room with no AD and independence. Unable to do stair training, assess gait endurance due to COVID restrictions. He is safe to return home with no therapy needs. He is in agreement with PT discharge, he states he is confident he can ascend/descend his stairs.           Problem: Physical Therapy    Goal Priority Disciplines Outcome Goal Variances Interventions   Physical Therapy Goal   (Resolved)     PT, PT/OT Met                     Time Tracking:     PT Received On: 08/19/22  PT Start Time: 0917     PT Stop Time: 0940  PT Total Time (min): 23 min     Billable Minutes: Gait Training 23    Treatment Type: Treatment  PT/PTA: PT     PTA Visit Number: 0     08/19/2022

## 2022-08-19 NOTE — PROGRESS NOTES
[facility]  Heart Failure  Progress Note     Hospital Length of Stay: 53  Interval History:  - Noticed to have anisocoria overnight. No other FNLD. CT head without any acute process.   - Decreased PI from 3 to 1 yesterday while standing. Gave 250 cc of IVF bolus. No further events noticed in   LVAD.   - Tolerating nectar thick diet.   - No BM in last 2 days.   - Working with PT/OT/SLP.     Vitals:  Temp:  [97 °F (36.1 °C)-98.2 °F (36.8 °C)] 98.2 °F (36.8 °C)  Pulse:  [] 75  Resp:  [14-20] 18  SpO2:  [92 %-100 %] 100 %  BP: (62-82)/(0) 74/0    Intake/Output    Intake/Output Summary (Last 24 hours) at 8/19/2022 1344  Last data filed at 8/19/2022 0800  Gross per 24 hour   Intake 1062.5 ml   Output 1025 ml   Net 37.5 ml     Physical Exam  Gen: Trach in place. No evidence of bleeding. Alert and awake.   HEENT: Pupils unequal and reactive to light.   Cardio: VAD hum obstructing heart sounds  Resp: No additional sound heard.   Abd: Soft, non-tender, non-distended  Skin: Warm,  trace peripheral edema  Neuro: No gross abnormalities.   Psych: Normal mood and affect.      Labs:  Recent Labs   Lab 08/19/22  0523 08/19/22  1027 08/19/22  1214   WBC 13.15* 14.24* 14.74*   HGB 7.8* 7.8* 8.0*   HCT 27.6* 29.0* 28.1*   * 143* 142*     Recent Labs   Lab 08/18/22  1212 08/18/22  2043 08/19/22  0523 08/19/22  1027     --  137 136   K 5.0  --  5.3* 4.9   *  --  110 108   CO2 17*  --  21* 23   BUN 47*  --  41* 40*   CREATININE 1.3  --  1.1 1.2   *  --  57* 121*   CALCIUM 9.3  --  8.9 8.9   MG 2.7* 2.5 2.4  --    PHOS 2.0* 2.2* 2.2*  --        Micro:    Current Meds:   amiodarone  400 mg Oral Daily    aspirin  81 mg Oral Daily    busPIRone  5 mg Oral TID    docusate  100 mg Oral Daily    levalbuterol  0.63 mg Nebulization Q6H    magnesium oxide  400 mg Oral BID    methIMAzole  5 mg Oral Daily    mexiletine  400 mg Oral Q8H    mirtazapine  15 mg Oral QHS    ondansetron  4 mg  Intravenous Once    pantoprazole  40 mg Oral Daily    polyethylene glycol  17 g Oral Daily    predniSONE  20 mg Oral Daily    [START ON 8/20/2022] warfarin  2.5 mg Oral Once per day on Sun Tue Wed Thu Sat    warfarin  5 mg Oral Once per day on Mon Fri       Continuous Infusions:   dextrose 10 % in water (D10W)      heparin (porcine) in D5W 23 Units/kg/hr (08/19/22 1209)     Assessment and Plan:  Cardiogenic Shock  -Previous HM2, underwent pump exchange to HM3 on 7/13/22 complicated by worsening CS/RV failure requiring [facility] ECMO.  -Currently trach ed. Chest tube removal, bronch, and old driveline removed 7/22.  -Re-intubated on 7/29/22 due to hypercapnic resp failure. Trach placed on 8/4. Transferred out of ICU on 8/17.   -Doing well on Trach collar.   -JVD at base of neck and had low PI episode on 8/17 thought to be due to hypovolemia. Received 250 cc bolus.      Trach site bleeding: Resolved.   - Continue minimal dosing heparin.   - Surgical team placed a suture on 8/11.         Fever: more than a week ago.   Leucocytosis trending down albeit slowly.   Completed one week of meropenem on 8/16. Prior to that completed course of cefepime and flagyl.   No growth in cultures yet.      History of ventricular tachycardia/Episode of A flutter 2:1 block:  Patient experienced an episode of VT on 6/30 and experienced an ICD shock thought to be related to hypokalemia (K of 3.1 on 6/30)  Aggressively replete K, keep K>4 and Mg>2  Continue mexiletine PO.  Amio gtt transitioned to PO.     COVID-19 virus infection:  -Previously hypoxic then recovered  -ID was consulted, recommended Remdesivir, course completed     Elevated serum lactate dehydrogenase (LDH):  S/p HM3 exchange for HM2 pump thrombosis       Amiodarone induced hypothyrodism initially, now hyperthyroidism:  -Endocrine team on board.  -On prednisone and methimazole.   - Prednisone 20 mg. Need to be on PJP prophylaxis (TMP//80) if on steroid for more than a  month of dose 20 or more. Steroids started on 7/28.   - Medications has been changes as per Endocrinology team.     Scot Card MD, FACP  Cardiovascular Disease Fellow PGY4  [facility]

## 2022-08-19 NOTE — PROGRESS NOTES
[facility] - [facility] Step[facility]  Endocrinology  Progress Note    Admit Date: 6/27/2022     56 year-old  male with NICM with LVAD, s/p ICD for VT on amiodarone and mexiletine and AIT followed by hypothyroidism initially admitted 6/27/2022 with COVID respiratory failure complicated with LVAD pump thrombosis that was refractory to medical therapy. Underwent LVAD pump exchange. Post-op course complicated by worsening cardiogenic shock/RV failure requiring VA-ECMO. Improved clinically underwent successful decannulation. Continued episodes of VT with ICD shock 7/21 and ongoing anti-arrhythmic mediation adjustments. TFTs on 7/27 significant for recurrent hyperthyroidism with undetectable TSH and elevated fT4.    - Patient re-intubated 07/29/2022    - Endocrinology consulted for recurrent AIT    Regarding AIT:    - Last seen outpatient by Dr. 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 He required a prolonged course of prednisone and methimazole, then subsequently developed hypothyroidism in May 2020. LT4 was adjusted based on serial TFT measurements. Most recently levothyroxine dose has been 112 mcg     - He self-decreased his amiodarone dose to 200 mg daily about 6 months ago. T4 was high on 7/13, but he was continued on levothyroxine 112 mcg    Lab Results   Component Value Date    TSH <0.010 (L) 07/27/2022    TSH 0.111 (L) 07/13/2022    TSH 4.079 (H) 03/17/2022    FREET4 2.51 (H) 08/07/2022    FREET4 2.95 (H) 08/06/2022    FREET4 2.18 (H) 08/05/2022      Latest Reference Range & Units 08/07/22 03:42 08/08/22 03:10 08/09/22 03:29 08/10/22 03:33   Free T4 0.71 - 1.51 ng/dL 2.51 (H) 2.43 (H) 2.23 (H) 2.12 (H)       Interval HPI:   Overnight events: Had head CT o/n for unequal pupils per RN. CT head was neg and pt was asymptomatic without neuro deficits.  Eating:   <25%  Nausea: No  Hypoglycemia and intervention: Yes - BG 57 x1, no  "correction needed, has not been requiring correction insulin.  Fever: No  TPN and/or TF: not currently  If yes, type of TF/TPN and rate: dietary supplements and SLP assessing for po diet     BP (!) 74/0 (BP Location: Left arm, Patient Position: Lying)   Pulse (!) 57   Temp 98.2 °F (36.8 °C)   Resp 16   Ht 6' 1" (1.854 m)   Wt 87.4 kg (192 lb 10.9 oz)   SpO2 95%   BMI 25.42 kg/m²     Labs Reviewed and Include    Recent Labs   Lab 08/19/22  1027   *   CALCIUM 8.9   ALBUMIN 2.2*   PROT 6.0      K 4.9   CO2 23      BUN 40*   CREATININE 1.2   ALKPHOS 239*   ALT 71*   AST 47*   BILITOT 1.9*     Lab Results   Component Value Date    WBC 14.24 (H) 08/19/2022    HGB 7.8 (L) 08/19/2022    HCT 29.0 (L) 08/19/2022     (H) 08/19/2022     (L) 08/19/2022     Recent Labs   Lab 08/14/22  1936 08/15/22  0346 08/16/22  0342 08/19/22  0523   TSH 0.127*  --   --   --    FREET4 1.39   < > 1.24 0.98    < > = values in this interval not displayed.     Lab Results   Component Value Date    HGBA1C 5.4 04/26/2021       Nutritional status:   Body mass index is 25.42 kg/m².  Lab Results   Component Value Date    ALBUMIN 2.2 (L) 08/19/2022    ALBUMIN 2.1 (L) 08/19/2022    ALBUMIN 2.3 (L) 08/18/2022     Lab Results   Component Value Date    PREALBUMIN 30 08/19/2022    PREALBUMIN 32 08/11/2022    PREALBUMIN 28 08/05/2022       Estimated Creatinine Clearance: 78.6 mL/min (based on SCr of 1.2 mg/dL).    Accu-Checks  Recent Labs     08/17/22  1149 08/17/22  1615 08/17/22  2153 08/18/22  0044 08/18/22  0706 08/18/22  1121 08/18/22  1533 08/18/22  1939 08/19/22  0756 08/19/22  1146   POCTGLUCOSE 181* 162* 118* 95 108 153* 134* 171* 71 88       Current Medications and/or Treatments Impacting Glycemic Control  Immunotherapy:    Immunosuppressants       None          Steroids:   Hormones (From admission, onward)                Start     Stop Route Frequency Ordered    08/19/22 0900  predniSONE tablet 20 mg         " -- Oral Daily 08/18/22 1620    08/02/22 1931  melatonin tablet 6 mg         -- OG Nightly PRN 08/02/22 1832          Pressors:    Autonomic Drugs (From admission, onward)                Start     Stop Route Frequency Ordered    07/29/22 0011  EPINEPHrine (ADRENALIN) 1 mg/mL injection        Note to Pharmacy: Created by cabinet override    07/29 1214   07/29/22 0011          Hyperglycemia/Diabetes Medications:   Antihyperglycemics (From admission, onward)                Start     Stop Route Frequency Ordered    07/30/22 1023  insulin aspart U-100 pen 0-5 Units         -- SubQ Every 4 hours PRN 07/30/22 0925            ASSESSMENT and PLAN    Amiodarone-induced hyperthyroidism  Key History and Diagnostic Findings  - History of AIT type 2 (TRAb and TSI negative; Thyroid US unremarkable with low vascularity)  - Weaned off methimazole and prednisone by May 2020, then developed hypothyroidism, Levothyroxine started and dose titrated per TFTs. Prior to current admission he was on Levothyroxine 112 mcg daily  - Labs consistent with hypothyroidism until current admission, initially on 7/13, with low TSH and elevated fT4. Repeat TFTs on 7/27 with worsening hyperthyroidism. He continued to receive LT4 112 mcg through 7/28. He remains on amiodarone for episodes of Ventricular Tachycardia  - Suspect current hyperthyroidism is likely due to AIT-2, however continued exogenous LT4 certainly contribute to current presentation   - fT4 now normal    Lab Results   Component Value Date    TSH 0.127 (L) 08/14/2022    TSH <0.010 (L) 07/27/2022    TSH 0.111 (L) 07/13/2022    FREET4 0.98 08/19/2022    FREET4 1.24 08/16/2022    FREET4 1.46 08/15/2022     Plan  - Strongly suspect AIT type 2 with improvement seen currently with the use of prednisone.   - Heparin can cause a false elevation in free T4 as it displaces free T4 from TBG; will follow-up direct dialysis result collected 8.5.22 -- lab was cancelled by receiving lab as sample  insufficient to perform lab   - Will continue methimazole 5mg daily and prednisone 20 mg daily and re-evaluate outpatient  - Decrease prednisone to 20 mg qd on 8/17 - will continue this dose at d/c and re-evaluate outpatient   - fT4 8/19 continues to be in lower half of normal range on methimazole 5mg qd and prednisone 20mg qd    amiodarone induced hypothyrodism  - Levothyroxine discontinued on 7/28/2022  - Please see plan as above    VT (ventricular tachycardia)  Recurrent VT requiring multiple antiarrhythmics, including continued, long-term need for amiodarone       Anticoagulation monitoring, INR range 2-3  In pts on amiodarone and warfarin, amiodarone-induced thyroid dysfunction can significantly influence warfarin response. The effect of warfarin is potentiated by thyrotoxicosis and attenuated in hypothyroidism. Close monitoring of INR recommended.      Hyperglycemia  Key History and Diagnostic Findings  - Hyperglycemia noted once starting TPN in addition to steroids  - No prior history of diabetes; most recent A1c of 5.4 on 04/26/2021  - Blood sugars tight in last 24 hours --even after stopping scheduled levemir and reducing scheduled aspart   - With decreasing steroids anticipate decreased insulin requirement  - Minimal insulin req in last 24h    Plan  - Continue LDC only  - Please notify endocrine if any change in tube feeds as this will change insulin requirements.  - Please hold insulin if tube feeds are held for some reason  - Please ensure that accuchecks are being documented every 4 hours    LVAD (left ventricular assist device) present  LVAD in place, continues to have runs of VT on multiple antiarrhythmics. Also cardioverted from AFL to SR on 7/30  Management by primary      Hypertensive cardiovascular-renal disease, stage 1-4 or unspecified chronic kidney disease, with heart failure  Careful insulin titration with impaired renal function, although not requiring insulin regularly now that steroid dose  has been decreased           María Brice MD  Endocrinology  [facility] - [facility]

## 2022-08-19 NOTE — NURSING
"Pt taken for sunshine therapy via cardiac chair by myself, Lindy Verde RN and wife, Kelly. Pt on trach collar with O2 and speaking valve, heparin gtt, tele monitor and VAD emergency equipment. Pt tolerated well and enjoyed being outside watching the rain, "watching the rain is one of my favorite things to do at home." Pt taken back to room 311 and let up in cardiac chair with call light within reach. YANET High with CTS to bedside to remove sutures. Pt and wife also made aware that he will be getting a wound vac to right groin.   "

## 2022-08-20 LAB
ALBUMIN SERPL BCP-MCNC: 2.2 G/DL (ref 3.5–5.2)
ALP SERPL-CCNC: 225 U/L (ref 55–135)
ALT SERPL W/O P-5'-P-CCNC: 64 U/L (ref 10–44)
ANION GAP SERPL CALC-SCNC: 6 MMOL/L (ref 8–16)
APTT BLDCRRT: 47.6 SEC (ref 21–32)
AST SERPL-CCNC: 43 U/L (ref 10–40)
BASOPHILS # BLD AUTO: 0.01 K/UL (ref 0–0.2)
BASOPHILS NFR BLD: 0.1 % (ref 0–1.9)
BILIRUB DIRECT SERPL-MCNC: 1.3 MG/DL (ref 0.1–0.3)
BILIRUB SERPL-MCNC: 1.8 MG/DL (ref 0.1–1)
BUN SERPL-MCNC: 32 MG/DL (ref 6–20)
CALCIUM SERPL-MCNC: 9 MG/DL (ref 8.7–10.5)
CHLORIDE SERPL-SCNC: 106 MMOL/L (ref 95–110)
CO2 SERPL-SCNC: 24 MMOL/L (ref 23–29)
CREAT SERPL-MCNC: 1.1 MG/DL (ref 0.5–1.4)
DIFFERENTIAL METHOD: ABNORMAL
EOSINOPHIL # BLD AUTO: 0.1 K/UL (ref 0–0.5)
EOSINOPHIL NFR BLD: 0.8 % (ref 0–8)
ERYTHROCYTE [DISTWIDTH] IN BLOOD BY AUTOMATED COUNT: 20 % (ref 11.5–14.5)
EST. GFR  (NO RACE VARIABLE): >60 ML/MIN/1.73 M^2
FIBRINOGEN PPP-MCNC: 591 MG/DL (ref 182–400)
GLUCOSE SERPL-MCNC: 68 MG/DL (ref 70–110)
HCT VFR BLD AUTO: 27.5 % (ref 40–54)
HGB BLD-MCNC: 7.8 G/DL (ref 14–18)
IMM GRANULOCYTES # BLD AUTO: 0.13 K/UL (ref 0–0.04)
IMM GRANULOCYTES NFR BLD AUTO: 1 % (ref 0–0.5)
INR PPP: 1 (ref 0.8–1.2)
INR PPP: 1 (ref 0.8–1.2)
LDH SERPL L TO P-CCNC: 299 U/L (ref 110–260)
LYMPHOCYTES # BLD AUTO: 0.8 K/UL (ref 1–4.8)
LYMPHOCYTES NFR BLD: 6.1 % (ref 18–48)
MAGNESIUM SERPL-MCNC: 2.2 MG/DL (ref 1.6–2.6)
MAGNESIUM SERPL-MCNC: 2.4 MG/DL (ref 1.6–2.6)
MCH RBC QN AUTO: 28.4 PG (ref 27–31)
MCHC RBC AUTO-ENTMCNC: 28.4 G/DL (ref 32–36)
MCV RBC AUTO: 100 FL (ref 82–98)
MONOCYTES # BLD AUTO: 0.9 K/UL (ref 0.3–1)
MONOCYTES NFR BLD: 6.9 % (ref 4–15)
NEUTROPHILS # BLD AUTO: 10.8 K/UL (ref 1.8–7.7)
NEUTROPHILS NFR BLD: 85.1 % (ref 38–73)
NRBC BLD-RTO: 0 /100 WBC
PHOSPHATE SERPL-MCNC: 2.3 MG/DL (ref 2.7–4.5)
PHOSPHATE SERPL-MCNC: 2.7 MG/DL (ref 2.7–4.5)
PLATELET # BLD AUTO: 138 K/UL (ref 150–450)
PMV BLD AUTO: 12.7 FL (ref 9.2–12.9)
POCT GLUCOSE: 81 MG/DL (ref 70–110)
POCT GLUCOSE: 94 MG/DL (ref 70–110)
POTASSIUM SERPL-SCNC: 5 MMOL/L (ref 3.5–5.1)
PROT SERPL-MCNC: 6 G/DL (ref 6–8.4)
PROTHROMBIN TIME: 10.4 SEC (ref 9–12.5)
PROTHROMBIN TIME: 10.9 SEC (ref 9–12.5)
RBC # BLD AUTO: 2.75 M/UL (ref 4.6–6.2)
SODIUM SERPL-SCNC: 136 MMOL/L (ref 136–145)
WBC # BLD AUTO: 12.72 K/UL (ref 3.9–12.7)

## 2022-08-20 PROCEDURE — 25000003 PHARM REV CODE 250

## 2022-08-20 PROCEDURE — 83735 ASSAY OF MAGNESIUM: CPT | Performed by: THORACIC SURGERY (CARDIOTHORACIC VASCULAR SURGERY)

## 2022-08-20 PROCEDURE — 99233 PR SUBSEQUENT HOSPITAL CARE,LEVL III: ICD-10-PCS | Mod: 25,,, | Performed by: INTERNAL MEDICINE

## 2022-08-20 PROCEDURE — 20600001 HC STEP DOWN PRIVATE ROOM

## 2022-08-20 PROCEDURE — 99900035 HC TECH TIME PER 15 MIN (STAT)

## 2022-08-20 PROCEDURE — 25000003 PHARM REV CODE 250: Performed by: STUDENT IN AN ORGANIZED HEALTH CARE EDUCATION/TRAINING PROGRAM

## 2022-08-20 PROCEDURE — 94761 N-INVAS EAR/PLS OXIMETRY MLT: CPT

## 2022-08-20 PROCEDURE — 93750 INTERROGATION VAD IN PERSON: CPT | Mod: ,,, | Performed by: INTERNAL MEDICINE

## 2022-08-20 PROCEDURE — 94640 AIRWAY INHALATION TREATMENT: CPT

## 2022-08-20 PROCEDURE — 99233 SBSQ HOSP IP/OBS HIGH 50: CPT | Mod: 25,,, | Performed by: INTERNAL MEDICINE

## 2022-08-20 PROCEDURE — 27000221 HC OXYGEN, UP TO 24 HOURS

## 2022-08-20 PROCEDURE — 85025 COMPLETE CBC W/AUTO DIFF WBC: CPT | Performed by: STUDENT IN AN ORGANIZED HEALTH CARE EDUCATION/TRAINING PROGRAM

## 2022-08-20 PROCEDURE — 85610 PROTHROMBIN TIME: CPT | Mod: 91 | Performed by: THORACIC SURGERY (CARDIOTHORACIC VASCULAR SURGERY)

## 2022-08-20 PROCEDURE — 80076 HEPATIC FUNCTION PANEL: CPT | Performed by: STUDENT IN AN ORGANIZED HEALTH CARE EDUCATION/TRAINING PROGRAM

## 2022-08-20 PROCEDURE — 63600175 PHARM REV CODE 636 W HCPCS: Performed by: STUDENT IN AN ORGANIZED HEALTH CARE EDUCATION/TRAINING PROGRAM

## 2022-08-20 PROCEDURE — 99900026 HC AIRWAY MAINTENANCE (STAT)

## 2022-08-20 PROCEDURE — 93750 PR INTERROGATE VENT ASSIST DEV, IN PERSON, W PHYSICIAN ANALYSIS: ICD-10-PCS | Mod: ,,, | Performed by: INTERNAL MEDICINE

## 2022-08-20 PROCEDURE — 85384 FIBRINOGEN ACTIVITY: CPT | Performed by: THORACIC SURGERY (CARDIOTHORACIC VASCULAR SURGERY)

## 2022-08-20 PROCEDURE — 84100 ASSAY OF PHOSPHORUS: CPT | Performed by: THORACIC SURGERY (CARDIOTHORACIC VASCULAR SURGERY)

## 2022-08-20 PROCEDURE — 80048 BASIC METABOLIC PNL TOTAL CA: CPT | Performed by: INTERNAL MEDICINE

## 2022-08-20 PROCEDURE — 25000003 PHARM REV CODE 250: Performed by: INTERNAL MEDICINE

## 2022-08-20 PROCEDURE — 27000248 HC VAD-ADDITIONAL DAY

## 2022-08-20 PROCEDURE — 25000242 PHARM REV CODE 250 ALT 637 W/ HCPCS

## 2022-08-20 PROCEDURE — 83615 LACTATE (LD) (LDH) ENZYME: CPT | Performed by: INTERNAL MEDICINE

## 2022-08-20 PROCEDURE — 85730 THROMBOPLASTIN TIME PARTIAL: CPT | Performed by: INTERNAL MEDICINE

## 2022-08-20 RX ADMIN — Medication 400 MG: at 09:08

## 2022-08-20 RX ADMIN — PREDNISONE 20 MG: 20 TABLET ORAL at 09:08

## 2022-08-20 RX ADMIN — BUSPIRONE HYDROCHLORIDE 5 MG: 5 TABLET ORAL at 02:08

## 2022-08-20 RX ADMIN — WARFARIN SODIUM 2.5 MG: 2.5 TABLET ORAL at 05:08

## 2022-08-20 RX ADMIN — LEVALBUTEROL HYDROCHLORIDE 0.63 MG: 0.63 SOLUTION RESPIRATORY (INHALATION) at 01:08

## 2022-08-20 RX ADMIN — LEVALBUTEROL HYDROCHLORIDE 0.63 MG: 0.63 SOLUTION RESPIRATORY (INHALATION) at 07:08

## 2022-08-20 RX ADMIN — POLYETHYLENE GLYCOL 3350 17 G: 17 POWDER, FOR SOLUTION ORAL at 09:08

## 2022-08-20 RX ADMIN — MEXILETINE HYDROCHLORIDE 400 MG: 200 CAPSULE ORAL at 09:08

## 2022-08-20 RX ADMIN — PANTOPRAZOLE SODIUM 40 MG: 40 GRANULE, DELAYED RELEASE ORAL at 09:08

## 2022-08-20 RX ADMIN — LEVALBUTEROL HYDROCHLORIDE 0.63 MG: 0.63 SOLUTION RESPIRATORY (INHALATION) at 09:08

## 2022-08-20 RX ADMIN — SENNOSIDES AND DOCUSATE SODIUM 1 TABLET: 50; 8.6 TABLET ORAL at 09:08

## 2022-08-20 RX ADMIN — BUSPIRONE HYDROCHLORIDE 5 MG: 5 TABLET ORAL at 09:08

## 2022-08-20 RX ADMIN — MEXILETINE HYDROCHLORIDE 400 MG: 200 CAPSULE ORAL at 05:08

## 2022-08-20 RX ADMIN — HEPARIN SODIUM 23 UNITS/KG/HR: 5000 INJECTION INTRAVENOUS; SUBCUTANEOUS at 05:08

## 2022-08-20 RX ADMIN — LEVALBUTEROL HYDROCHLORIDE 0.63 MG: 0.63 SOLUTION RESPIRATORY (INHALATION) at 02:08

## 2022-08-20 RX ADMIN — ASPIRIN 81 MG CHEWABLE TABLET 81 MG: 81 TABLET CHEWABLE at 09:08

## 2022-08-20 RX ADMIN — HEPARIN SODIUM 23 UNITS/KG/HR: 5000 INJECTION INTRAVENOUS; SUBCUTANEOUS at 04:08

## 2022-08-20 RX ADMIN — AMIODARONE HYDROCHLORIDE 400 MG: 200 TABLET ORAL at 09:08

## 2022-08-20 RX ADMIN — MEXILETINE HYDROCHLORIDE 400 MG: 200 CAPSULE ORAL at 02:08

## 2022-08-20 RX ADMIN — MIRTAZAPINE 15 MG: 15 TABLET, FILM COATED ORAL at 09:08

## 2022-08-20 RX ADMIN — METHIMAZOLE 5 MG: 5 TABLET ORAL at 09:08

## 2022-08-20 NOTE — CONSULTS
[facility] - [facility] Step[facility]  Wound Care    Patient Name:  Tim Richards   MRN:  [id number]  Date: 2022  Diagnosis: Left ventricular assist device (LVAD) complication    History:     Past Medical History:   Diagnosis Date    Anticoagulant long-term use     CHF (congestive heart failure)     Class 1 obesity due to excess calories with serious comorbidity and body mass index (BMI) of 31.0 to 31.9 in adult     Dilated cardiomyopathy 1/10/2018    Disorder of kidney and ureter     CKD    Encounter for blood transfusion     Gout     HTN (hypertension)     Hx of psychiatric care     ICD (implantable cardioverter-defibrillator) infection 2020    Psychiatric problem     Thyroid disease     Ventricular tachycardia (paroxysmal)        Social History     Socioeconomic History    Marital status:     Number of children: 3   Occupational History     Employer: [employer]    Tobacco Use    Smoking status: Former Smoker     Packs/day: 1.00     Years: 31.00     Pack years: 31.00     Types: Cigarettes     Quit date: 2018     Years since quittin.6    Smokeless tobacco: Never Used    Tobacco comment: pt is quiting on his own - pt stated not qualified for program;  pt  quit on his own   Substance and Sexual Activity    Alcohol use: No     Alcohol/week: 0.0 standard drinks     Comment: quit    Drug use: No    Sexual activity: Yes     Partners: Female     Birth control/protection: None     Comment: 10/5/17  with same partner 7 years    Social History Narrative    Disabled       Precautions:     Allergies as of 2022 - Reviewed 2022   Allergen Reaction Noted    Lisinopril Anaphylaxis 2017    Hydralazine analogues  2020       WO Assessment Details/Treatment     Wound care consult received. Patient with large amounts of serous to yellow clear drainage noted to the dressing and his gown. Wound vac applied per MD order with no difficulties. Area of graft  protected with 4 layers of adaptic and white foam. Wound vac set to -75 mmHg and seal achieved. Patient tolerated with no difficulties. Plan for next dressing change on Tuesday. Nursing to continue care.        08/19/22 1740   [facility] Assessment   [facility] Total Time (min's) 45   Visit Date 08/19/22   Visit Time 1740   Consult Type New   [facility] Speciality Wound   [facility] List wound vac   Procedure wound vac   Intervention assessed;changed;chart review;coordination of care        Incision/Site 07/13/22 2028 Right Groin   Date First Assessed/Time First Assessed: 07/13/22 2028   Side: Right  Location: Groin   Dressing Appearance Intact;Moist drainage;Saturated   Drainage Amount Large   Drainage Characteristics/Odor Clear;Serous;Yellow   Appearance Pink;Gray;Moist   Periwound Area Intact   Wound Edges Open   Wound Length (cm) 0.5 cm   Wound Width (cm) 4 cm   Wound Depth (cm) 3 cm   Wound Volume (cm^3) 6 cm^3   Wound Surface Area (cm^2) 2 cm^2   Care Cleansed with:;Sterile normal saline   Dressing Changed   Periwound Care Skin barrier film applied   Dressing Change Due 08/23/22        Negative Pressure Wound Therapy  08/19/22 Right anterior   Placement Date: 08/19/22   Side: Right  Orientation: anterior  Location: Groin   NPWT Type Vacuum Therapy   Therapy Setting NPWT Continuous therapy   Pressure Setting NPWT 75 mmHg   Therapy Interventions NPWT Dressing changed;Canister changed;Seal intact   Sponges Inserted NPWT White;2;Black;1     08/19/2022

## 2022-08-20 NOTE — PLAN OF CARE
Pt maintained free from falls/trauma/injuries and skin breakdown. Pt denied pain or discomfort. LVAD drsg change done. Island boarder applied to mid sternal incision. Right abd wound drsg changed. BG monitored and no coverage needed. Lab drawn. Heparin going at 23 units/kg/hr. Plan of care reviewed. Pt verbalized understanding. All questions and concerns addressed. Will continue to monitor.

## 2022-08-20 NOTE — PROGRESS NOTES
08/20/2022  Fitz Christianson    Current provider:  Amy Rhodes MD    Device interrogation:  TXP LVAD INTERROGATIONS 8/20/2022 8/20/2022 8/19/2022 8/19/2022 8/19/2022 8/19/2022 8/19/2022   Type HeartMate3 HeartMate3 HeartMate3 HeartMate3 HeartMate3 HeartMate3 HeartMate3   Flow 5.0 5.2 5.3 5.2 5.2 5.4 5.0   Speed 6300 6300 6300 6300 6300 6300 6300   PI 4. 3.9 3.4 3.2 3.4 3.6 3.5   Power (Jackson) 5.3 5.2 5.3 5.2 5.0 5.0 5.2   LSL 5900 5900 5900 5900 - - 5900   Low Flow Alarm - - - - - - -   Pulsatility - - - No Pulse - - -          Rounded on Tim Richards to ensure all mechanical assist device settings (IABP or VAD) were appropriate and all parameters were within limits.  I was able to ensure all back up equipment was present, the staff had no issues, and the Perfusion Department daily rounding was complete.      For implantable VADs: Interrogation of Ventricular assist device was performed with analysis of device parameters and review of device function. I have personally reviewed the interrogation findings and agree with findings as stated.     In emergency, the nursing units have been notified to contact the perfusion department either by:  Calling [phone] from 630am to 4pm Mon thru Fri, utilizing the On-Call Finder functionality of Epic and searching for Perfusion, or by contacting the hospital  from 4pm to 630am and on weekends and asking to speak with the perfusionist on call.    11:23 AM

## 2022-08-20 NOTE — RESPIRATORY THERAPY
[facility] RESPIRATORY THERAPY PROACTIVE NOTE           Time of visit: 1003     Code Status: Full Code   : 1965  Bed: 311/311 A:   MRN: [id number]  Time spent at the bedside: < 15 min    SITUATION    Evaluated patient for: LDA Check     BACKGROUND    Patient has a past medical history of Anticoagulant long-term use, CHF (congestive heart failure), Class 1 obesity due to excess calories with serious comorbidity and body mass index (BMI) of 31.0 to 31.9 in adult, Dilated cardiomyopathy, Disorder of kidney and ureter, Encounter for blood transfusion, Gout, HTN (hypertension), psychiatric care, ICD (implantable cardioverter-defibrillator) infection, Psychiatric problem, Thyroid disease, and Ventricular tachycardia (paroxysmal).  Clinically Significant Surgical Hx: tracheostomy    24 Hours Vitals Range:  Temp:  [97 °F (36.1 °C)-98.7 °F (37.1 °C)]   Pulse:  [57-96]   Resp:  [16-20]   BP: (60-76)/(0)   SpO2:  [95 %-100 %]     Labs:    Recent Labs     22  0523 22  1027 22  1214 22  0430    136  --   --  136   K 5.3* 4.9  --   --  5.0    108  --   --  106   CO2 21* 23  --   --  24   CREATININE 1.1 1.2  --   --  1.1   GLU 57* 121*  --   --  68*   PHOS 2.2*  --  2.2* 2.5* 2.3*   MG 2.4  --  2.6 2.5 2.4        No results for input(s): PH, PCO2, PO2, HCO3, POCSATURATED, BE in the last 72 hours.    ASSESSMENT/INTERVENTIONS  Patient resting comfortably. No concerns at this time      Last VS   Temp: 97 °F (36.1 °C) (410)  Pulse: 82 ( 0934)  Resp: 20 (726)  BP: 60/0 (720)  SpO2: 97 % (726)      Extra trachs at bedside: 6.0, 8.0 Cuffed  Level of Consciousness: Level of Consciousness (AVPU): alert  Respiratory Effort: Respiratory Effort: Normal Expansion/Accessory Muscle Usage: Expansion/Accessory Muscles/Retractions: expansion symmetric, no retractions, no use of accessory muscles  All Lung Field Breath Sounds: All Lung Fields Breath Sounds:  diminished  SHERWIN Breath Sounds: diminished  LLL Breath Sounds: coarse, diminished  RUL Breath Sounds: Anterior:, rhonchi  RML Breath Sounds: coarse  RLL Breath Sounds: diminished  O2 Device/Concentration: 5L / 28%  Surgical airway: Yes, Type: Shiley Size: 8, cuffed  Ambu at bedside: Ambu bag with the patient?: Yes, Adult Ambu     Active Orders   Respiratory Care    Chest physiotherapy Q6H PRN     Frequency: Q6H PRN     Number of Occurrences: Until Specified     Order Questions:      Indications: COPIOUS SPUTUM PRODUCTION    Inhalation Treatment Q6H     Frequency: Q6H     Number of Occurrences: Until Specified    Oxygen Continuous     Frequency: Continuous     Number of Occurrences: Until Specified     Order Questions:      Device type: Low flow      Device: Trach Collar      FiO2%: 40      Titrate O2 per Oxygen Titration Protocol: Yes      To maintain SpO2 goal of: >= 90%      Notify MD of: Inability to achieve desired SpO2; Sudden change in patient status and requires 20% increase in FiO2; Patient requires >60% FiO2    Pulse Oximetry Q4H     Frequency: Q4H     Number of Occurrences: Until Specified    Routine tracheostomy care     Frequency: BID     Number of Occurrences: Until Specified       RECOMMENDATIONS    We recommend: RRT Recs: Continue POC per primary team.      FOLLOW-UP    Please call back the Rapid Response RTZee RRT at x [phone] for any questions or concerns.

## 2022-08-20 NOTE — AI DETERIORATION ALERT
"[facility] NURSE AI ALERT       AI alert received.    Chart Reviewed: 08/19/2022, 8:23 PM    MRN: [id number]  Bed: 311/311 A    Dx: Left ventricular assist device (LVAD) complication    Tim Richards has a past medical history of Anticoagulant long-term use, CHF (congestive heart failure), Class 1 obesity due to excess calories with serious comorbidity and body mass index (BMI) of 31.0 to 31.9 in adult, Dilated cardiomyopathy, Disorder of kidney and ureter, Encounter for blood transfusion, Gout, HTN (hypertension), psychiatric care, ICD (implantable cardioverter-defibrillator) infection, Psychiatric problem, Thyroid disease, and Ventricular tachycardia (paroxysmal).    Last VS: BP (!) 66/0 (BP Location: Left arm, Patient Position: Sitting)   Pulse 96   Temp 97.1 °F (36.2 °C) (Temporal)   Resp 18   Ht 6' 1" (1.854 m)   Wt 87.4 kg (192 lb 10.9 oz)   SpO2 98%   BMI 25.42 kg/m²     24H Vital Sign Range:  Temp:  [97 °F (36.1 °C)-98.7 °F (37.1 °C)]   Pulse:  [57-96]   Resp:  [14-20]   BP: (66-82)/(0)   SpO2:  [95 %-100 %]     Level of Consciousness (AVPU): alert    Recent Labs     08/19/22  0523 08/19/22  1027 08/19/22  1214   WBC 13.15* 14.24* 14.74*   HGB 7.8* 7.8* 8.0*   HCT 27.6* 29.0* 28.1*   * 143* 142*       Recent Labs     08/18/22  1212 08/18/22 2043 08/19/22  0523 08/19/22  1027 08/19/22  1214     --  137 136  --    K 5.0  --  5.3* 4.9  --    *  --  110 108  --    CO2 17*  --  21* 23  --    CREATININE 1.3  --  1.1 1.2  --    *  --  57* 121*  --    PHOS 2.0* 2.2* 2.2*  --  2.2*   MG 2.7* 2.5 2.4  --  2.6        No results for input(s): PH, PCO2, PO2, HCO3, POCSATURATED, BE in the last 72 hours.     OXYGEN:  Flow (L/min): 8  Oxygen Concentration (%): 35  O2 Device (Oxygen Therapy): Trach Collar    MEWS score: 2    Bedside RNStephania contacted. No concerns verbalized at this time. Instructed to call [phone] for further concerns or assistance.    Mirela Fischer RN        "

## 2022-08-20 NOTE — NURSING
Plan of care discussed with patient.  Patient bedbound fall precautions in place. Pt spent approximately 3 hours in cardiac chair. LVAD DP and numbers WNL, smooth LVAD hum. Patient has no complaints of pain. Heparin gtt maintained. Discussed medications and care.  Patient has no questions at this time. Will continue to monitor.     Pt taken outside for sunshine therapy in cardiac chair by myself, Bridget Freeman RN and wife, Kelly. Pt on trach collar with O2 and speaking valve, heparin gtt, tele monitor and VAD emergency equipment. Time spent in sunshine therapy 30 minutes, pt expressed appreciation for fresh air and watching the rain- which pt states he enjoys doing when at home.

## 2022-08-20 NOTE — PROGRESS NOTES
[facility]  [facility]  Progress Note     Hospital Length of Stay: 54  Interval History:  - PI event with decrease from 4 to 1.5/1.9. Patient asymptomatic.   - Had one BM today.   - Working with PT/OT/SLP.     Vitals:  Temp:  [97 °F (36.1 °C)-98.7 °F (37.1 °C)] 97 °F (36.1 °C)  Pulse:  [57-96] 82  Resp:  [16-20] 20  SpO2:  [95 %-100 %] 97 %  BP: (60-76)/(0) 60/0    Intake/Output    Intake/Output Summary (Last 24 hours) at 8/20/2022 1047  Last data filed at 8/20/2022 0900  Gross per 24 hour   Intake 1270 ml   Output 1225 ml   Net 45 ml     Physical Exam  Gen: Trach in place. No evidence of bleeding. Alert and awake.   HEENT: Pupils unequal and reactive to light.   Cardio: VAD hum obstructing heart sounds  Resp: No additional sound heard.   Abd: Soft, non-tender, non-distended  Skin: Warm,  trace peripheral edema  Neuro: No gross abnormalities.   Psych: Normal mood and affect.      Labs:  Recent Labs   Lab 08/19/22  1214 08/19/22 2012 08/20/22  0430   WBC 14.74* 13.77*  14.07* 12.72*   HGB 8.0* 7.8*  7.8* 7.8*   HCT 28.1* 27.7*  28.5* 27.5*   * 132*  145* 138*     Recent Labs   Lab 08/19/22  0523 08/19/22  1027 08/19/22  1214 08/19/22 2012 08/20/22  0430    136  --   --  136   K 5.3* 4.9  --   --  5.0    108  --   --  106   CO2 21* 23  --   --  24   BUN 41* 40*  --   --  32*   CREATININE 1.1 1.2  --   --  1.1   GLU 57* 121*  --   --  68*   CALCIUM 8.9 8.9  --   --  9.0   MG 2.4  --  2.6 2.5 2.4   PHOS 2.2*  --  2.2* 2.5* 2.3*     Current Meds:   amiodarone  400 mg Oral Daily    aspirin  81 mg Oral Daily    busPIRone  5 mg Oral TID    levalbuterol  0.63 mg Nebulization Q6H    magnesium oxide  400 mg Oral BID    methIMAzole  5 mg Oral Daily    mexiletine  400 mg Oral Q8H    mirtazapine  15 mg Oral QHS    ondansetron  4 mg Intravenous Once    pantoprazole  40 mg Oral Daily    polyethylene glycol  17 g Oral Daily    predniSONE  20 mg Oral Daily    senna-docusate  8.6-50 mg  1 tablet Oral Daily    warfarin  2.5 mg Oral Once per day on Sun Tue Wed Thu Sat    warfarin  5 mg Oral Once per day on Mon Fri     Continuous Infusions:   dextrose 10 % in water (D10W)      heparin (porcine) in D5W 23 Units/kg/hr (08/20/22 1973)     Assessment and Plan:  Cardiogenic Shock  -Previous HM2, underwent pump exchange to HM3 on 7/13/22 complicated by worsening CS/RV failure requiring VA ECMO.  -Currently trach ed. Chest tube removal, bronch, and old driveline removed 7/22.  -Re-intubated on 7/29/22 due to hypercapnic resp failure. Trach placed on 8/4. Transferred out of ICU on 8/17.   -Doing well on Trach collar.   -JVD at base of neck.     Trach site bleeding: Resolved.   - Continue minimal dosing heparin.   - Surgical team placed a suture on 8/11.     Fever: more than a week ago.   - Leucocytosis trending down albeit slowly.   - Completed one week of meropenem on 8/16. Prior to that completed course of cefepime and flagyl.   - No growth in cultures yet.      History of ventricular tachycardia/Episode of A flutter 2:1 block:  Patient experienced an episode of VT on 6/30 and experienced an ICD shock thought to be related to hypokalemia (K of 3.1 on 6/30)  Aggressively replete K, keep K>4 and Mg>2  Continue mexiletine PO.  Amio gtt transitioned to PO.     COVID-19 virus infection:  -Previously hypoxic then recovered  -ID was consulted, recommended Remdesivir, course completed     Elevated serum lactate dehydrogenase (LDH):  S/p HM3 exchange for HM2 pump thrombosis    Amiodarone induced hypothyrodism initially, now hyperthyroidism:  -Endocrine team on board.  -On prednisone and methimazole.   - Prednisone 20 mg. Need to be on PJP prophylaxis (TMP//80) if on steroid for more than a month of dose 20 or more. Steroids started on 7/28.   - Medications has been changes as per Endocrinology team.     Scot Card MD, FACP  Cardiovascular Disease Fellow PGY4  [facility]

## 2022-08-20 NOTE — NURSING
"LVAD DLES dressing changed under sterile technique using VAD kit. DLES is a "2" with yellow drainage noted on drsg. Pt tolerated well, no bleeding noted, no active drainage noted. Dressing due to be changed 8/2022. Will continue to monitor.   "

## 2022-08-20 NOTE — CARE UPDATE
RAPID RESPONSE NURSE ROUND       Rounding completed with charge RNMiguel for mews score. No additional concerns verbalized at this time. Instructed to call [phone] for further concerns or assistance.

## 2022-08-21 PROBLEM — E16.2 HYPOGLYCEMIA: Status: ACTIVE | Noted: 2018-03-08

## 2022-08-21 LAB
ALBUMIN SERPL BCP-MCNC: 2.1 G/DL (ref 3.5–5.2)
ALP SERPL-CCNC: 208 U/L (ref 55–135)
ALT SERPL W/O P-5'-P-CCNC: 58 U/L (ref 10–44)
ANION GAP SERPL CALC-SCNC: 6 MMOL/L (ref 8–16)
APTT BLDCRRT: 50.6 SEC (ref 21–32)
AST SERPL-CCNC: 37 U/L (ref 10–40)
BASOPHILS # BLD AUTO: 0.01 K/UL (ref 0–0.2)
BASOPHILS # BLD AUTO: 0.01 K/UL (ref 0–0.2)
BASOPHILS NFR BLD: 0.1 % (ref 0–1.9)
BASOPHILS NFR BLD: 0.1 % (ref 0–1.9)
BILIRUB DIRECT SERPL-MCNC: 1.2 MG/DL (ref 0.1–0.3)
BILIRUB SERPL-MCNC: 1.7 MG/DL (ref 0.1–1)
BUN SERPL-MCNC: 24 MG/DL (ref 6–20)
CALCIUM SERPL-MCNC: 8.9 MG/DL (ref 8.7–10.5)
CHLORIDE SERPL-SCNC: 105 MMOL/L (ref 95–110)
CO2 SERPL-SCNC: 24 MMOL/L (ref 23–29)
CREAT SERPL-MCNC: 1 MG/DL (ref 0.5–1.4)
DIFFERENTIAL METHOD: ABNORMAL
DIFFERENTIAL METHOD: ABNORMAL
EOSINOPHIL # BLD AUTO: 0 K/UL (ref 0–0.5)
EOSINOPHIL # BLD AUTO: 0.1 K/UL (ref 0–0.5)
EOSINOPHIL NFR BLD: 0.1 % (ref 0–8)
EOSINOPHIL NFR BLD: 0.5 % (ref 0–8)
ERYTHROCYTE [DISTWIDTH] IN BLOOD BY AUTOMATED COUNT: 19.2 % (ref 11.5–14.5)
ERYTHROCYTE [DISTWIDTH] IN BLOOD BY AUTOMATED COUNT: 19.6 % (ref 11.5–14.5)
EST. GFR  (NO RACE VARIABLE): >60 ML/MIN/1.73 M^2
FIBRINOGEN PPP-MCNC: 625 MG/DL (ref 182–400)
GLUCOSE SERPL-MCNC: 67 MG/DL (ref 70–110)
HCT VFR BLD AUTO: 27.4 % (ref 40–54)
HCT VFR BLD AUTO: 27.4 % (ref 40–54)
HGB BLD-MCNC: 7.7 G/DL (ref 14–18)
HGB BLD-MCNC: 7.9 G/DL (ref 14–18)
IMM GRANULOCYTES # BLD AUTO: 0.12 K/UL (ref 0–0.04)
IMM GRANULOCYTES # BLD AUTO: 0.13 K/UL (ref 0–0.04)
IMM GRANULOCYTES NFR BLD AUTO: 0.8 % (ref 0–0.5)
IMM GRANULOCYTES NFR BLD AUTO: 1 % (ref 0–0.5)
INR PPP: 1 (ref 0.8–1.2)
LDH SERPL L TO P-CCNC: 314 U/L (ref 110–260)
LYMPHOCYTES # BLD AUTO: 0.7 K/UL (ref 1–4.8)
LYMPHOCYTES # BLD AUTO: 0.9 K/UL (ref 1–4.8)
LYMPHOCYTES NFR BLD: 5.3 % (ref 18–48)
LYMPHOCYTES NFR BLD: 5.6 % (ref 18–48)
MAGNESIUM SERPL-MCNC: 2.2 MG/DL (ref 1.6–2.6)
MCH RBC QN AUTO: 27.3 PG (ref 27–31)
MCH RBC QN AUTO: 28 PG (ref 27–31)
MCHC RBC AUTO-ENTMCNC: 28.1 G/DL (ref 32–36)
MCHC RBC AUTO-ENTMCNC: 28.8 G/DL (ref 32–36)
MCV RBC AUTO: 100 FL (ref 82–98)
MCV RBC AUTO: 95 FL (ref 82–98)
MONOCYTES # BLD AUTO: 0.8 K/UL (ref 0.3–1)
MONOCYTES # BLD AUTO: 0.8 K/UL (ref 0.3–1)
MONOCYTES NFR BLD: 5.3 % (ref 4–15)
MONOCYTES NFR BLD: 6.4 % (ref 4–15)
NEUTROPHILS # BLD AUTO: 11 K/UL (ref 1.8–7.7)
NEUTROPHILS # BLD AUTO: 13.7 K/UL (ref 1.8–7.7)
NEUTROPHILS NFR BLD: 86.7 % (ref 38–73)
NEUTROPHILS NFR BLD: 88.1 % (ref 38–73)
NRBC BLD-RTO: 0 /100 WBC
NRBC BLD-RTO: 0 /100 WBC
PHOSPHATE SERPL-MCNC: 2.8 MG/DL (ref 2.7–4.5)
PLATELET # BLD AUTO: 136 K/UL (ref 150–450)
PLATELET # BLD AUTO: 153 K/UL (ref 150–450)
PMV BLD AUTO: 11.8 FL (ref 9.2–12.9)
PMV BLD AUTO: 12.3 FL (ref 9.2–12.9)
POCT GLUCOSE: 137 MG/DL (ref 70–110)
POCT GLUCOSE: 69 MG/DL (ref 70–110)
POTASSIUM SERPL-SCNC: 4.7 MMOL/L (ref 3.5–5.1)
PROT SERPL-MCNC: 5.8 G/DL (ref 6–8.4)
PROTHROMBIN TIME: 10.7 SEC (ref 9–12.5)
RBC # BLD AUTO: 2.75 M/UL (ref 4.6–6.2)
RBC # BLD AUTO: 2.89 M/UL (ref 4.6–6.2)
SODIUM SERPL-SCNC: 135 MMOL/L (ref 136–145)
T3 SERPL-MCNC: <40 NG/DL (ref 60–180)
T4 FREE SERPL-MCNC: 0.93 NG/DL (ref 0.71–1.51)
TSH SERPL DL<=0.005 MIU/L-ACNC: 3.94 UIU/ML (ref 0.4–4)
WBC # BLD AUTO: 12.62 K/UL (ref 3.9–12.7)
WBC # BLD AUTO: 15.58 K/UL (ref 3.9–12.7)

## 2022-08-21 PROCEDURE — 99232 SBSQ HOSP IP/OBS MODERATE 35: CPT | Mod: ,,, | Performed by: GENERAL ACUTE CARE HOSPITAL

## 2022-08-21 PROCEDURE — 93750 INTERROGATION VAD IN PERSON: CPT | Mod: ,,, | Performed by: INTERNAL MEDICINE

## 2022-08-21 PROCEDURE — 85730 THROMBOPLASTIN TIME PARTIAL: CPT | Performed by: INTERNAL MEDICINE

## 2022-08-21 PROCEDURE — 84480 ASSAY TRIIODOTHYRONINE (T3): CPT | Performed by: STUDENT IN AN ORGANIZED HEALTH CARE EDUCATION/TRAINING PROGRAM

## 2022-08-21 PROCEDURE — 99900035 HC TECH TIME PER 15 MIN (STAT)

## 2022-08-21 PROCEDURE — 25000242 PHARM REV CODE 250 ALT 637 W/ HCPCS: Performed by: STUDENT IN AN ORGANIZED HEALTH CARE EDUCATION/TRAINING PROGRAM

## 2022-08-21 PROCEDURE — 25000003 PHARM REV CODE 250: Performed by: INTERNAL MEDICINE

## 2022-08-21 PROCEDURE — 93750 PR INTERROGATE VENT ASSIST DEV, IN PERSON, W PHYSICIAN ANALYSIS: ICD-10-PCS | Mod: ,,, | Performed by: INTERNAL MEDICINE

## 2022-08-21 PROCEDURE — 84439 ASSAY OF FREE THYROXINE: CPT | Performed by: STUDENT IN AN ORGANIZED HEALTH CARE EDUCATION/TRAINING PROGRAM

## 2022-08-21 PROCEDURE — 83615 LACTATE (LD) (LDH) ENZYME: CPT | Performed by: INTERNAL MEDICINE

## 2022-08-21 PROCEDURE — 80048 BASIC METABOLIC PNL TOTAL CA: CPT | Performed by: INTERNAL MEDICINE

## 2022-08-21 PROCEDURE — 85025 COMPLETE CBC W/AUTO DIFF WBC: CPT | Performed by: STUDENT IN AN ORGANIZED HEALTH CARE EDUCATION/TRAINING PROGRAM

## 2022-08-21 PROCEDURE — 85610 PROTHROMBIN TIME: CPT | Performed by: STUDENT IN AN ORGANIZED HEALTH CARE EDUCATION/TRAINING PROGRAM

## 2022-08-21 PROCEDURE — 84100 ASSAY OF PHOSPHORUS: CPT | Performed by: STUDENT IN AN ORGANIZED HEALTH CARE EDUCATION/TRAINING PROGRAM

## 2022-08-21 PROCEDURE — 83735 ASSAY OF MAGNESIUM: CPT | Performed by: STUDENT IN AN ORGANIZED HEALTH CARE EDUCATION/TRAINING PROGRAM

## 2022-08-21 PROCEDURE — 25000003 PHARM REV CODE 250: Performed by: STUDENT IN AN ORGANIZED HEALTH CARE EDUCATION/TRAINING PROGRAM

## 2022-08-21 PROCEDURE — 99233 PR SUBSEQUENT HOSPITAL CARE,LEVL III: ICD-10-PCS | Mod: 25,,, | Performed by: INTERNAL MEDICINE

## 2022-08-21 PROCEDURE — 85384 FIBRINOGEN ACTIVITY: CPT | Performed by: THORACIC SURGERY (CARDIOTHORACIC VASCULAR SURGERY)

## 2022-08-21 PROCEDURE — 63600175 PHARM REV CODE 636 W HCPCS: Performed by: STUDENT IN AN ORGANIZED HEALTH CARE EDUCATION/TRAINING PROGRAM

## 2022-08-21 PROCEDURE — 94640 AIRWAY INHALATION TREATMENT: CPT

## 2022-08-21 PROCEDURE — 84443 ASSAY THYROID STIM HORMONE: CPT | Performed by: STUDENT IN AN ORGANIZED HEALTH CARE EDUCATION/TRAINING PROGRAM

## 2022-08-21 PROCEDURE — 99232 PR SUBSEQUENT HOSPITAL CARE,LEVL II: ICD-10-PCS | Mod: ,,, | Performed by: GENERAL ACUTE CARE HOSPITAL

## 2022-08-21 PROCEDURE — 99900026 HC AIRWAY MAINTENANCE (STAT)

## 2022-08-21 PROCEDURE — 80076 HEPATIC FUNCTION PANEL: CPT | Performed by: STUDENT IN AN ORGANIZED HEALTH CARE EDUCATION/TRAINING PROGRAM

## 2022-08-21 PROCEDURE — 27000221 HC OXYGEN, UP TO 24 HOURS

## 2022-08-21 PROCEDURE — 85025 COMPLETE CBC W/AUTO DIFF WBC: CPT | Mod: 91 | Performed by: INTERNAL MEDICINE

## 2022-08-21 PROCEDURE — 94761 N-INVAS EAR/PLS OXIMETRY MLT: CPT

## 2022-08-21 PROCEDURE — 99233 SBSQ HOSP IP/OBS HIGH 50: CPT | Mod: 25,,, | Performed by: INTERNAL MEDICINE

## 2022-08-21 PROCEDURE — 27000248 HC VAD-ADDITIONAL DAY

## 2022-08-21 PROCEDURE — 25000003 PHARM REV CODE 250

## 2022-08-21 PROCEDURE — 25000242 PHARM REV CODE 250 ALT 637 W/ HCPCS

## 2022-08-21 PROCEDURE — 20600001 HC STEP DOWN PRIVATE ROOM

## 2022-08-21 RX ORDER — LEVALBUTEROL 1.25 MG/.5ML
1.25 SOLUTION, CONCENTRATE RESPIRATORY (INHALATION) ONCE
Status: COMPLETED | OUTPATIENT
Start: 2022-08-21 | End: 2022-08-21

## 2022-08-21 RX ORDER — WARFARIN SODIUM 5 MG/1
5 TABLET ORAL DAILY
Status: DISCONTINUED | OUTPATIENT
Start: 2022-08-21 | End: 2022-08-22

## 2022-08-21 RX ORDER — ONDANSETRON 2 MG/ML
4 INJECTION INTRAMUSCULAR; INTRAVENOUS ONCE
Status: COMPLETED | OUTPATIENT
Start: 2022-08-21 | End: 2022-08-21

## 2022-08-21 RX ADMIN — HEPARIN SODIUM 23 UNITS/KG/HR: 5000 INJECTION INTRAVENOUS; SUBCUTANEOUS at 08:08

## 2022-08-21 RX ADMIN — BUSPIRONE HYDROCHLORIDE 5 MG: 5 TABLET ORAL at 02:08

## 2022-08-21 RX ADMIN — LEVALBUTEROL HYDROCHLORIDE 0.63 MG: 0.63 SOLUTION RESPIRATORY (INHALATION) at 01:08

## 2022-08-21 RX ADMIN — MIRTAZAPINE 15 MG: 15 TABLET, FILM COATED ORAL at 09:08

## 2022-08-21 RX ADMIN — AMIODARONE HYDROCHLORIDE 400 MG: 200 TABLET ORAL at 10:08

## 2022-08-21 RX ADMIN — MEXILETINE HYDROCHLORIDE 400 MG: 200 CAPSULE ORAL at 02:08

## 2022-08-21 RX ADMIN — ONDANSETRON 4 MG: 2 INJECTION INTRAMUSCULAR; INTRAVENOUS at 11:08

## 2022-08-21 RX ADMIN — MEXILETINE HYDROCHLORIDE 400 MG: 200 CAPSULE ORAL at 05:08

## 2022-08-21 RX ADMIN — WARFARIN SODIUM 5 MG: 5 TABLET ORAL at 09:08

## 2022-08-21 RX ADMIN — HEPARIN SODIUM 23 UNITS/KG/HR: 5000 INJECTION INTRAVENOUS; SUBCUTANEOUS at 11:08

## 2022-08-21 RX ADMIN — MEXILETINE HYDROCHLORIDE 400 MG: 200 CAPSULE ORAL at 10:08

## 2022-08-21 RX ADMIN — SODIUM CHLORIDE 500 ML: 0.9 INJECTION, SOLUTION INTRAVENOUS at 09:08

## 2022-08-21 RX ADMIN — LEVALBUTEROL HYDROCHLORIDE 0.63 MG: 0.63 SOLUTION RESPIRATORY (INHALATION) at 07:08

## 2022-08-21 RX ADMIN — ASPIRIN 81 MG CHEWABLE TABLET 81 MG: 81 TABLET CHEWABLE at 10:08

## 2022-08-21 RX ADMIN — Medication 400 MG: at 09:08

## 2022-08-21 RX ADMIN — SODIUM CHLORIDE 500 ML: 0.9 INJECTION, SOLUTION INTRAVENOUS at 04:08

## 2022-08-21 RX ADMIN — LEVALBUTEROL HYDROCHLORIDE 1.25 MG: 1.25 SOLUTION, CONCENTRATE RESPIRATORY (INHALATION) at 04:08

## 2022-08-21 RX ADMIN — LEVALBUTEROL HYDROCHLORIDE 0.63 MG: 0.63 SOLUTION RESPIRATORY (INHALATION) at 08:08

## 2022-08-21 RX ADMIN — PANTOPRAZOLE SODIUM 40 MG: 40 GRANULE, DELAYED RELEASE ORAL at 10:08

## 2022-08-21 RX ADMIN — Medication 16 G: at 01:08

## 2022-08-21 RX ADMIN — Medication 400 MG: at 10:08

## 2022-08-21 RX ADMIN — LEVALBUTEROL HYDROCHLORIDE 0.63 MG: 0.63 SOLUTION RESPIRATORY (INHALATION) at 12:08

## 2022-08-21 RX ADMIN — PREDNISONE 20 MG: 20 TABLET ORAL at 10:08

## 2022-08-21 RX ADMIN — METHIMAZOLE 5 MG: 5 TABLET ORAL at 10:08

## 2022-08-21 RX ADMIN — BUSPIRONE HYDROCHLORIDE 5 MG: 5 TABLET ORAL at 10:08

## 2022-08-21 NOTE — RESPIRATORY THERAPY
RAPID RESPONSE RESPIRATORY THERAPY PROACTIVE NOTE           Time of visit: 808     Code Status: Full Code   : 1965  Bed: 311/311 A:   MRN: [id number]  Time spent at the bedside: < 15 min    SITUATION    Evaluated patient for: LDA Check     BACKGROUND    Patient has a past medical history of Anticoagulant long-term use, CHF (congestive heart failure), Class 1 obesity due to excess calories with serious comorbidity and body mass index (BMI) of 31.0 to 31.9 in adult, Dilated cardiomyopathy, Disorder of kidney and ureter, Encounter for blood transfusion, Gout, HTN (hypertension), psychiatric care, ICD (implantable cardioverter-defibrillator) infection, Psychiatric problem, Thyroid disease, and Ventricular tachycardia (paroxysmal).  Clinically Significant Surgical Hx: tracheostomy    24 Hours Vitals Range:  Temp:  [98.1 °F (36.7 °C)-98.8 °F (37.1 °C)]   Pulse:  []   Resp:  [16-18]   BP: (60-76)/(0)   SpO2:  [92 %-97 %]     Labs:    Recent Labs     22  1027 22  1214 22  0430 22  1514 22  0444     --  136  --  135*   K 4.9  --  5.0  --  4.7     --  106  --  105   CO2 23  --  24  --  24   CREATININE 1.2  --  1.1  --  1.0   *  --  68*  --  67*   PHOS  --    < > 2.3* 2.7 2.8   MG  --    < > 2.4 2.2 2.2    < > = values in this interval not displayed.        No results for input(s): PH, PCO2, PO2, HCO3, POCSATURATED, BE in the last 72 hours.    ASSESSMENT/INTERVENTIONS  Patient resting comfortably. No respiratory distress or concerns at this time. Obturator at [facility]. All safety supplies at bedside      Last VS   Temp: 98.8 °F (37.1 °C) (749)  Pulse: 72 (819)  Resp: 18 (819)  BP: 72/0 (749)  SpO2: 95 % (819)      Extra trachs at bedside: 6.0 & 8.0 cuffed  Level of Consciousness: Level of Consciousness (AVPU): alert  Respiratory Effort: Respiratory Effort: Normal, Unlabored Expansion/Accessory Muscle Usage: Expansion/Accessory  Muscles/Retractions: no use of accessory muscles, no retractions, expansion symmetric  All Lung Field Breath Sounds: All Lung Fields Breath Sounds: Anterior:, Lateral:  SHERWIN Breath Sounds: equal bilaterally, clear, diminished  LLL Breath Sounds: clear, diminished  RUL Breath Sounds: clear  RML Breath Sounds: clear, diminished  RLL Breath Sounds: diminished  O2 Device/Concentration: 5L 28%  Surgical airway: Yes, Type: Shiley Size: 8, cuffed  Ambu at bedside: Ambu bag with the patient?: Yes, Adult Ambu     Active Orders   Respiratory Care    Chest physiotherapy Q6H PRN     Frequency: Q6H PRN     Number of Occurrences: Until Specified     Order Questions:      Indications: COPIOUS SPUTUM PRODUCTION    Inhalation Treatment Q6H     Frequency: Q6H     Number of Occurrences: Until Specified    Oxygen Continuous     Frequency: Continuous     Number of Occurrences: Until Specified     Order Questions:      Device type: Low flow      Device: Trach Collar      FiO2%: 40      Titrate O2 per Oxygen Titration Protocol: Yes      To maintain SpO2 goal of: >= 90%      Notify MD of: Inability to achieve desired SpO2; Sudden change in patient status and requires 20% increase in FiO2; Patient requires >60% FiO2    Pulse Oximetry Q4H     Frequency: Q4H     Number of Occurrences: Until Specified    Routine tracheostomy care     Frequency: BID     Number of Occurrences: Until Specified       RECOMMENDATIONS    We recommend: RRT Recs: Continue POC per primary team.      FOLLOW-UP    Please call back the Rapid Response RT, Taylor Ramsay RRT at x [phone] for any questions or concerns.

## 2022-08-21 NOTE — PROGRESS NOTES
08/21/2022  Fitz Christianson    Current provider:  Amy Rhodes MD    Device interrogation:  TXP LVAD INTERROGATIONS 8/21/2022 8/21/2022 8/21/2022 8/20/2022 8/20/2022 8/20/2022 8/20/2022   Type HeartMate3 HeartMate3 HeartMate3 HeartMate3 HeartMate3 HeartMate3 HeartMate3   Flow 5.6 5.4 5.6 5.3 5.4 5.4 5.0   Speed 6300 6300 6300 6300 6300 6350 6300   PI 1.5 3.2 1.8 3.6 2 2.9 4.   Power (Jackson) 5.4 5.3 5.3 5.3 5.4 5.3 5.3   LSL 5900 5900 5900 5900 - 5900 5900   Low Flow Alarm - - - - - - -   Pulsatility - - - No Pulse - - -          Rounded on Tim Richards to ensure all mechanical assist device settings (IABP or VAD) were appropriate and all parameters were within limits.  I was able to ensure all back up equipment was present, the staff had no issues, and the Perfusion Department daily rounding was complete.      For implantable VADs: Interrogation of Ventricular assist device was performed with analysis of device parameters and review of device function. I have personally reviewed the interrogation findings and agree with findings as stated.     In emergency, the nursing units have been notified to contact the perfusion department either by:  Calling [phone] from 630am to 4pm Mon thru Fri, utilizing the On-Call Finder functionality of Epic and searching for Perfusion, or by contacting the hospital  from 4pm to 630am and on weekends and asking to speak with the perfusionist on call.    8:23 AM

## 2022-08-21 NOTE — PROGRESS NOTES
[facility]  Heart Failure  Progress Note     Hospital Length of Stay: 55  Interval History:  - No significant event noted in the LVAD  - Had one BM yesterday.  - Tolerating nectar thick diet.     Vitals:  Temp:  [98.1 °F (36.7 °C)-98.8 °F (37.1 °C)] 98.8 °F (37.1 °C)  Pulse:  [] 72  Resp:  [16-18] 18  SpO2:  [92 %-97 %] 95 %  BP: (60-76)/(0) 72/0    Intake/Output    Intake/Output Summary (Last 24 hours) at 8/21/2022 0830  Last data filed at 8/21/2022 0449  Gross per 24 hour   Intake 1020 ml   Output 750 ml   Net 270 ml     Physical Exam  Gen: Trach in place. No evidence of bleeding. Alert and awake.   HEENT: Pupils unequal and reactive to light. JVD at the base of the neck.   Cardio: VAD hum obstructing heart sounds  Resp: No additional sound heard.   Abd: Soft, non-tender, non-distended  Skin: Warm, no edema.   Neuro: No gross abnormalities.   Psych: Normal mood and affect.      Labs:  Recent Labs   Lab 08/19/22 2012 08/20/22  0430 08/21/22  0444   WBC 13.77*  14.07* 12.72* 12.62   HGB 7.8*  7.8* 7.8* 7.7*   HCT 27.7*  28.5* 27.5* 27.4*   *  145* 138* 136*     Recent Labs   Lab 08/19/22  1027 08/19/22  1214 08/20/22  0430 08/20/22  1514 08/21/22  0444     --  136  --  135*   K 4.9  --  5.0  --  4.7     --  106  --  105   CO2 23  --  24  --  24   BUN 40*  --  32*  --  24*   CREATININE 1.2  --  1.1  --  1.0   *  --  68*  --  67*   CALCIUM 8.9  --  9.0  --  8.9   MG  --    < > 2.4 2.2 2.2   PHOS  --    < > 2.3* 2.7 2.8    < > = values in this interval not displayed.     Current Meds:   amiodarone  400 mg Oral Daily    aspirin  81 mg Oral Daily    busPIRone  5 mg Oral TID    levalbuterol  0.63 mg Nebulization Q6H    magnesium oxide  400 mg Oral BID    methIMAzole  5 mg Oral Daily    mexiletine  400 mg Oral Q8H    mirtazapine  15 mg Oral QHS    ondansetron  4 mg Intravenous Once    pantoprazole  40 mg Oral Daily    polyethylene glycol  17 g Oral Daily     predniSONE  20 mg Oral Daily    senna-docusate 8.6-50 mg  1 tablet Oral Daily    warfarin  2.5 mg Oral Once per day on Sun Tue Wed Thu Sat    warfarin  5 mg Oral Once per day on Mon Fri     Continuous Infusions:   dextrose 10 % in water (D10W)      heparin (porcine) in D5W 23 Units/kg/hr (08/20/22 0543)     Assessment and Plan:  Cardiogenic Shock  -Previous HM2, underwent pump exchange to HM3 on 7/13/22 complicated by worsening CS/RV failure requiring VA ECMO.  -Currently trach ed. Chest tube removal, bronch, and old driveline removed 7/22.  -Re-intubated on 7/29/22 due to hypercapnic resp failure. Trach placed on 8/4. Transferred out of ICU on 8/17.   -Doing well on Trach collar. Plan for possibly downgrading the trach size on Monday.   -JVD at base of neck.  -Being bridged with heparin for INR to be therapeutic on warfarin. Increased warfarin to 5.      Trach site bleeding: Resolved.   -Surgical team placed a suture on 8/11.     Fever: more than a week ago.   -Leucocytosis trending down albeit slowly.   -Completed one week of meropenem on 8/16. Prior to that completed course of cefepime and flagyl.   -No growth in cultures yet.      History of ventricular tachycardia/Episode of A flutter 2:1 block:  -Patient experienced an episode of VT on 6/30 and experienced an ICD shock thought to be related to hypokalemia (K of 3.1 on 6/30)  -Aggressively replete K, keep K>4 and Mg>2  -Continue mexiletine PO.  -Amio gtt transitioned to PO.     COVID-19 virus infection:  -Previously hypoxic then recovered  -ID was consulted, recommended Remdesivir, course completed     Elevated serum lactate dehydrogenase (LDH):  -S/p HM3 exchange for HM2 pump thrombosis    Amiodarone induced hypothyrodism initially, now hyperthyroidism:  -Endocrine team on board.  -On prednisone and methimazole.   -Prednisone 20 mg. Need to be on PJP prophylaxis (TMP//80) if on steroid for more than a month of dose       20 or more. Steroids  started on 7/28.   -Medications has been changes as per Endocrinology team.     Scot Card MD, FACP  Cardiovascular Disease Fellow PGY4  [facility]

## 2022-08-21 NOTE — PROGRESS NOTES
Patient developed a severe headache, acute onset; non-radiating with no associated visual changes, nausea or vomiting but felt like he was sweating. On exam appeared diaphoretic. Vitals stable. No neuro deficits noted. Follows commands, strength intact bilaterally. Will order STAT CT and hold heparin drip.     Discussed with \[facility] [place] [facility]\"" Attending, Dr. Meagan Romero MD  Heart Transplant

## 2022-08-21 NOTE — CARE UPDATE
RAPID RESPONSE NURSE PROACTIVE ROUNDING NOTE       Time of Visit: 1745    Admit Date: 2022  LOS: 55  Code Status: Full Code   Date of Visit: 2022  : 1965  Age: 56 y.o.  Sex: male  Race: Black or   Bed: 311/311 A:   MRN: [id number]  Was the patient discharged from an ICU this admission? Yes   Was the patient discharged from a PACU within last 24 hours? No   Did the patient receive conscious sedation/general anesthesia in last 24 hours? No  Was the patient in the ED within the past 24 hours? No  Was the patient on NIPPV within the past 24 hours? No   Attending Physician: Amy Rhodes MD  Primary Service: [facility] 1   Time spent at the bedside: 15 -30 min    SITUATION    Notified by [employer] patient alert.  Reason for alert: artificial intelligence  Called to evaluate the patient for Respiratory    BACKGROUND     Why is the patient in the hospital?: Left ventricular assist device (LVAD) complication    Patient has a past medical history of Anticoagulant long-term use, CHF (congestive heart failure), Class 1 obesity due to excess calories with serious comorbidity and body mass index (BMI) of 31.0 to 31.9 in adult, Dilated cardiomyopathy, Disorder of kidney and ureter, Encounter for blood transfusion, Gout, HTN (hypertension), psychiatric care, ICD (implantable cardioverter-defibrillator) infection, Psychiatric problem, Thyroid disease, and Ventricular tachycardia (paroxysmal).    Last Vitals:  Temp: 97.9 °F (36.6 °C) ( 1757)  Pulse: 88 ( 1617)  Resp: 35 ( 1617)  BP: 78/0 ( 1524)  SpO2: 90 % ( 1617)    24 Hours Vitals Range:  Temp:  [97.9 °F (36.6 °C)-99.3 °F (37.4 °C)]   Pulse:  [62-88]   Resp:  [16-35]   BP: (66-84)/(0)   SpO2:  [90 %-96 %]     Labs:  Recent Labs     22  0430 22  0444   WBC 13.77*  14.07* 12.72* 12.62   HGB 7.8*  7.8* 7.8* 7.7*   HCT 27.7*  28.5* 27.5* 27.4*   *  145* 138* 136*       Recent  "Labs     08/19/22  1027 08/19/22  1214 08/20/22  0430 08/20/22  1514 08/21/22  0444     --  136  --  135*   K 4.9  --  5.0  --  4.7     --  106  --  105   CO2 23  --  24  --  24   CREATININE 1.2  --  1.1  --  1.0   *  --  68*  --  67*   PHOS  --    < > 2.3* 2.7 2.8   MG  --    < > 2.4 2.2 2.2    < > = values in this interval not displayed.        No results for input(s): PH, PCO2, PO2, HCO3, POCSATURATED, BE in the last 72 hours.     ASSESSMENT    Physical Exam    INTERVENTIONS    The patient was seen for Respiratory problem. Staff concerns included tachypnea and increased WOB. The following interventions were performed: POCT arterial blood gas  and portable chest x-ray.    RECOMMENDATIONS    Pt tachypnic and saturating 90% per respiratory therapist.  Pt has a VAD and trach with passy mirian valve in place, and we are unable to get a sat at this time.  Pt says his breathing is "labored."  Pt also reports dizziness and slight headache.  Pt just returned from CT scan.  Pt also had recently had a breathing treatment.  Pt reports having just taken "about 4" medications together with nectar thickened liquids.  Wife is also concerned about slight swelling in right eye. CXR done.  Dr. Romero secure chatted and says she will come by and see the patient.  We also recommend CBC (concerned for sepsis secondary to aspiration pneumonia) and ABG as we are unable to get a saturation.  Wife at bedside and all questions answered.  Bedside nurse also present and aware of plan.    PROVIDER ESCALATION    Yes/No  yes    Orders received and case discussed with Dr. Romero.    Disposition: Remain in room 311.    FOLLOW-UP    bedside Zach DIAZ updated on plan of care. Instructed to call the Rapid Response Nurse, Carmencita Palacios RN at [phone] for additional questions or concerns.          "

## 2022-08-21 NOTE — PROGRESS NOTES
[facility] - Cardiology Stepdown  Endocrinology  Progress Note    Admit Date: 2022     56 year-old  male with NICM with LVAD, s/p ICD for VT on amiodarone and mexiletine and AIT followed by hypothyroidism initially admitted 2022 with COVID respiratory failure complicated with LVAD pump thrombosis that was refractory to medical therapy. Underwent LVAD pump exchange. Post-op course complicated by worsening cardiogenic shock/RV failure requiring VA-ECMO. Improved clinically underwent successful decannulation. Continued episodes of VT with ICD shock  and ongoing anti-arrhythmic mediation adjustments. TFTs on  significant for recurrent hyperthyroidism with undetectable TSH and elevated fT4.    - Patient re-intubated 2022    - Endocrinology consulted for recurrent AIT    Regarding AIT:    - Last seen outpatient by Dr. 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 He required a prolonged course of prednisone and methimazole, then subsequently developed hypothyroidism in May 2020. LT4 was adjusted based on serial TFT measurements. Most recently levothyroxine dose has been 112 mcg     - He self-decreased his amiodarone dose to 200 mg daily about 6 months ago. T4 was high on , but he was continued on levothyroxine 112 mcg    Lab Results   Component Value Date    TSH <0.010 (L) 2022    TSH 0.111 (L) 2022    TSH 4.079 (H) 2022    FREET4 2.51 (H) 2022    FREET4 2.95 (H) 2022    FREET4 2.18 (H) 2022      Latest Reference Range & Units 22 03:42 22 03:10 22 03:29 08/10/22 03:33   Free T4 0.71 - 1.51 ng/dL 2.51 (H) 2.43 (H) 2.23 (H) 2.12 (H)       Interval HPI:   Overnight events: No acute events o/n. fT4 stable today.   Eatin%  Nausea: No  Hypoglycemia and intervention: Yes; lows 57-69, not receiving insulin   Fever: No  TPN and/or TF: No; now on full  "liquid nectar thick diet with boost dietary supplements   If yes, type of TF/TPN and rate: n/a    BP (!) 84/0 (BP Location: Left arm, Patient Position: Lying)   Pulse 62   Temp 99.3 °F (37.4 °C) (Oral)   Resp 18   Ht 6' 1" (1.854 m)   Wt 89 kg (196 lb 3.4 oz)   SpO2 95%   BMI 25.89 kg/m²     Labs Reviewed and Include    Recent Labs   Lab 08/21/22  0444   GLU 67*   CALCIUM 8.9   ALBUMIN 2.1*   PROT 5.8*   *   K 4.7   CO2 24      BUN 24*   CREATININE 1.0   ALKPHOS 208*   ALT 58*   AST 37   BILITOT 1.7*     Lab Results   Component Value Date    WBC 12.62 08/21/2022    HGB 7.7 (L) 08/21/2022    HCT 27.4 (L) 08/21/2022     (H) 08/21/2022     (L) 08/21/2022     Recent Labs   Lab 08/14/22  1936 08/15/22  0346 08/19/22  0523 08/21/22  0444   TSH 0.127*  --   --  3.940   FREET4 1.39   < > 0.98 0.93    < > = values in this interval not displayed.     Lab Results   Component Value Date    HGBA1C 5.4 04/26/2021       Nutritional status:   Body mass index is 25.89 kg/m².  Lab Results   Component Value Date    ALBUMIN 2.1 (L) 08/21/2022    ALBUMIN 2.2 (L) 08/20/2022    ALBUMIN 2.2 (L) 08/19/2022     Lab Results   Component Value Date    PREALBUMIN 30 08/19/2022    PREALBUMIN 32 08/11/2022    PREALBUMIN 28 08/05/2022       Estimated Creatinine Clearance: 94.3 mL/min (based on SCr of 1 mg/dL).    Accu-Checks  Recent Labs     08/18/22  1533 08/18/22  1939 08/19/22  0756 08/19/22  1146 08/19/22  1551 08/19/22  2302 08/20/22  2005 08/21/22  1234   POCTGLUCOSE 134* 171* 71 88 159* 94 81 69*       Current Medications and/or Treatments Impacting Glycemic Control  Immunotherapy:    Immunosuppressants       None          Steroids:   Hormones (From admission, onward)                Start     Stop Route Frequency Ordered    08/19/22 0900  predniSONE tablet 20 mg         -- Oral Daily 08/18/22 1620    08/02/22 1931  melatonin tablet 6 mg         -- OG Nightly PRN 08/02/22 1832          Pressors:    Autonomic " Drugs (From admission, onward)                Start     Stop Route Frequency Ordered    07/29/22 0011  EPINEPHrine (ADRENALIN) 1 mg/mL injection        Note to Pharmacy: Created by cabinet override    07/29 1214   07/29/22 0011          Hyperglycemia/Diabetes Medications:   Antihyperglycemics (From admission, onward)                Start     Stop Route Frequency Ordered    07/30/22 1023  insulin aspart U-100 pen 0-5 Units         -- SubQ Every 4 hours PRN 07/30/22 0925            ASSESSMENT and PLAN    Amiodarone-induced hyperthyroidism  Key History and Diagnostic Findings  - History of AIT type 2 (TRAb and TSI negative; Thyroid US unremarkable with low vascularity)  - Weaned off methimazole and prednisone by May 2020, then developed hypothyroidism, Levothyroxine started and dose titrated per TFTs. Prior to current admission he was on Levothyroxine 112 mcg daily  - Labs consistent with hypothyroidism until current admission, initially on 7/13, with low TSH and elevated fT4. Repeat TFTs on 7/27 with worsening hyperthyroidism. He continued to receive LT4 112 mcg through 7/28. He remains on amiodarone for episodes of Ventricular Tachycardia  - Suspect current hyperthyroidism is likely due to AIT-2, however continued exogenous LT4 certainly contribute to current presentation   - fT4 now normal    Lab Results   Component Value Date    TSH 3.940 08/21/2022    TSH 0.127 (L) 08/14/2022    TSH <0.010 (L) 07/27/2022    FREET4 0.93 08/21/2022    FREET4 0.98 08/19/2022    FREET4 1.24 08/16/2022     Plan  - Strongly suspect AIT type 2 with improvement seen currently with the use of prednisone.   - Will continue methimazole 5mg daily and prednisone 20 mg daily for now. May need to be discharged on both, however may warrant discontinuation of methimazole and prednisone tapering while inpatient based on clinical picture and labs.  - Trend TFTs q48-72h   - If FT4 continues to down trend and concerns of hypothyroidism arise will stop  methimazole and will start slow taper of steroids.        amiodarone induced hypothyrodism  - Levothyroxine discontinued on 7/28/2022  - Please see plan as above    Anticoagulation monitoring, INR range 2-3  In pts on amiodarone and warfarin, amiodarone-induced thyroid dysfunction can significantly influence warfarin response. The effect of warfarin is potentiated by thyrotoxicosis and attenuated in hypothyroidism. Close monitoring of INR recommended.      Hypoglycemia  Key History and Diagnostic Findings  - Initially hyperglycemia noted once starting TPN and later TF in addition to steroids and critical illness when in the ICU  - No prior history of diabetes; most recent A1c of 5.4 on 04/26/2021  - With decreasing steroid dose and overall clinical improvement as well as poor po intake, now with mild hypoglycemia intermittently.    Plan  - Continue accuchecks and would optimize nutrition, attempting po,although insufficient po intake at times   - Please notify endocrine if any change in diet or tube feeds as this will change insulin requirements.  - OK to leave LDC on MAR for now as prn, not requiring insulin lately however       LVAD (left ventricular assist device) present  LVAD in place, continues to have runs of VT on multiple antiarrhythmics. Also cardioverted from AFL to SR on 7/30  Management by primary      Hypertensive cardiovascular-renal disease, stage 1-4 or unspecified chronic kidney disease, with heart failure  Careful insulin titration with impaired renal function, although not requiring insulin regularly now that steroid dose has been decreased           María Brice MD  Endocrinology  [facility] - Cardiology Stepdown

## 2022-08-21 NOTE — ASSESSMENT & PLAN NOTE
Key History and Diagnostic Findings  - Initially hyperglycemia noted once starting TPN and later TF in addition to steroids and critical illness when in the ICU  - No prior history of diabetes; most recent A1c of 5.4 on 04/26/2021  - With decreasing steroid dose and overall clinical improvement as well as poor po intake, now with mild hypoglycemia intermittently.    Plan  - Continue accuchecks and would optimize nutrition, attempting po,although insufficient po intake at times   - Please notify endocrine if any change in diet or tube feeds as this will change insulin requirements.  - OK to leave LDC on MAR for now as prn, not requiring insulin lately however

## 2022-08-21 NOTE — PLAN OF CARE
Pt maintained free from falls/trauma/injuries and skin breakdown. Pt denied pain or discomfort. BG monitored and no coverage needed. Lab drawn. Heparin going at 23 units/kg/hr. Plan of care reviewed. Pt verbalized understanding. All questions and concerns addressed. Will continue to monitor.

## 2022-08-21 NOTE — PLAN OF CARE
"Plan of care discussed with patient.  Patient Aox4 and VS stable. Patient remains free of falls and trauma. LVAD DP and numbers WNL, smooth LVAD hum. Patient has no complaints of pain. Patient did have episode of headache rating 7/10 and just "not feeling right." MD was notified heparin gtt stopped and STAT CT done. CT was negative so heparin gtt restarted and orders for 500 mL NS infusion over 3 hours started. Heparin gtt continuing. Waffle mattress applied to patient's bed. Offered dressing change but at the time of offering, patient stated he needed some more time to start feeling better. Will offer again before end of shift. Discussed medications and care. Patient has no questions at this time. Will continue to monitor.    "

## 2022-08-21 NOTE — RESPIRATORY THERAPY
At about 320 pt was complaining of being hot and nauseated and a headache also SOB states he cant get enough air pt was sx'd.  Dr Romero came and saw him we took pt to CT without incident tried to inflate cuff due to nausea but pt panicked and could not tolerate cuff reamained down. On return to room he was placed back to wall O2 on trach mask at 10L 40%. Will monitor for fur there progress. Pt has PMV if he needs to communicate.

## 2022-08-21 NOTE — SUBJECTIVE & OBJECTIVE
"Interval HPI:   Overnight events: No acute events o/n. fT4 stable today.   Eatin%  Nausea: No  Hypoglycemia and intervention: Yes; lows 57-69, not receiving insulin   Fever: No  TPN and/or TF: No; now on full liquid nectar thick diet with boost dietary supplements   If yes, type of TF/TPN and rate: n/a    BP (!) 84/0 (BP Location: Left arm, Patient Position: Lying)   Pulse 62   Temp 99.3 °F (37.4 °C) (Oral)   Resp 18   Ht 6' 1" (1.854 m)   Wt 89 kg (196 lb 3.4 oz)   SpO2 95%   BMI 25.89 kg/m²     Labs Reviewed and Include    Recent Labs   Lab 22  0444   GLU 67*   CALCIUM 8.9   ALBUMIN 2.1*   PROT 5.8*   *   K 4.7   CO2 24      BUN 24*   CREATININE 1.0   ALKPHOS 208*   ALT 58*   AST 37   BILITOT 1.7*     Lab Results   Component Value Date    WBC 12.62 2022    HGB 7.7 (L) 2022    HCT 27.4 (L) 2022     (H) 2022     (L) 2022     Recent Labs   Lab 22  1936 08/15/22  0346 22  0523 22  0444   TSH 0.127*  --   --  3.940   FREET4 1.39   < > 0.98 0.93    < > = values in this interval not displayed.     Lab Results   Component Value Date    HGBA1C 5.4 2021       Nutritional status:   Body mass index is 25.89 kg/m².  Lab Results   Component Value Date    ALBUMIN 2.1 (L) 2022    ALBUMIN 2.2 (L) 2022    ALBUMIN 2.2 (L) 2022     Lab Results   Component Value Date    PREALBUMIN 30 2022    PREALBUMIN 32 2022    PREALBUMIN 28 2022       Estimated Creatinine Clearance: 94.3 mL/min (based on SCr of 1 mg/dL).    Accu-Checks  Recent Labs     22  1533 22  1939 22  0756 22  1146 22  1551 22  2302 22  2005 22  1234   POCTGLUCOSE 134* 171* 71 88 159* 94 81 69*       Current Medications and/or Treatments Impacting Glycemic Control  Immunotherapy:    Immunosuppressants       None          Steroids:   Hormones (From admission, onward)                Start     Stop " Route Frequency Ordered    08/19/22 0900  predniSONE tablet 20 mg         -- Oral Daily 08/18/22 1620    08/02/22 1931  melatonin tablet 6 mg         -- OG Nightly PRN 08/02/22 1832          Pressors:    Autonomic Drugs (From admission, onward)                Start     Stop Route Frequency Ordered    07/29/22 0011  EPINEPHrine (ADRENALIN) 1 mg/mL injection        Note to Pharmacy: Created by cabinet override    07/29 1214   07/29/22 0011          Hyperglycemia/Diabetes Medications:   Antihyperglycemics (From admission, onward)                Start     Stop Route Frequency Ordered    07/30/22 1023  insulin aspart U-100 pen 0-5 Units         -- SubQ Every 4 hours PRN 07/30/22 0982

## 2022-08-22 PROBLEM — N18.32 STAGE 3B CHRONIC KIDNEY DISEASE: Status: ACTIVE | Noted: 2018-01-12

## 2022-08-22 PROBLEM — N18.30 CKD (CHRONIC KIDNEY DISEASE), STAGE III: Chronic | Status: RESOLVED | Noted: 2018-01-12 | Resolved: 2022-08-22

## 2022-08-22 LAB
ALBUMIN SERPL BCP-MCNC: 2.1 G/DL (ref 3.5–5.2)
ALP SERPL-CCNC: 182 U/L (ref 55–135)
ALT SERPL W/O P-5'-P-CCNC: 58 U/L (ref 10–44)
AMYLASE SERPL-CCNC: 126 U/L (ref 20–110)
ANION GAP SERPL CALC-SCNC: 4 MMOL/L (ref 8–16)
APTT BLDCRRT: 42.6 SEC (ref 21–32)
APTT BLDCRRT: 55.2 SEC (ref 21–32)
APTT BLDCRRT: 56.1 SEC (ref 21–32)
AST SERPL-CCNC: 34 U/L (ref 10–40)
BASOPHILS # BLD AUTO: 0.01 K/UL (ref 0–0.2)
BASOPHILS NFR BLD: 0.1 % (ref 0–1.9)
BILIRUB DIRECT SERPL-MCNC: 1.1 MG/DL (ref 0.1–0.3)
BILIRUB SERPL-MCNC: 1.6 MG/DL (ref 0.1–1)
BNP SERPL-MCNC: 886 PG/ML (ref 0–99)
BUN SERPL-MCNC: 21 MG/DL (ref 6–20)
CALCIUM SERPL-MCNC: 8.9 MG/DL (ref 8.7–10.5)
CHLORIDE SERPL-SCNC: 100 MMOL/L (ref 95–110)
CO2 SERPL-SCNC: 26 MMOL/L (ref 23–29)
CREAT SERPL-MCNC: 1.1 MG/DL (ref 0.5–1.4)
CRP SERPL-MCNC: 57.9 MG/L (ref 0–8.2)
DIFFERENTIAL METHOD: ABNORMAL
EOSINOPHIL # BLD AUTO: 0 K/UL (ref 0–0.5)
EOSINOPHIL NFR BLD: 0.2 % (ref 0–8)
ERYTHROCYTE [DISTWIDTH] IN BLOOD BY AUTOMATED COUNT: 19 % (ref 11.5–14.5)
EST. GFR  (NO RACE VARIABLE): >60 ML/MIN/1.73 M^2
FIBRINOGEN PPP-MCNC: 637 MG/DL (ref 182–400)
GLUCOSE SERPL-MCNC: 69 MG/DL (ref 70–110)
HCT VFR BLD AUTO: 27.3 % (ref 40–54)
HGB BLD-MCNC: 7.8 G/DL (ref 14–18)
IMM GRANULOCYTES # BLD AUTO: 0.13 K/UL (ref 0–0.04)
IMM GRANULOCYTES NFR BLD AUTO: 1.1 % (ref 0–0.5)
INR PPP: 1 (ref 0.8–1.2)
LDH SERPL L TO P-CCNC: 282 U/L (ref 110–260)
LIPASE SERPL-CCNC: 90 U/L (ref 4–60)
LYMPHOCYTES # BLD AUTO: 0.6 K/UL (ref 1–4.8)
LYMPHOCYTES NFR BLD: 5 % (ref 18–48)
MAGNESIUM SERPL-MCNC: 2.2 MG/DL (ref 1.6–2.6)
MCH RBC QN AUTO: 28.4 PG (ref 27–31)
MCHC RBC AUTO-ENTMCNC: 28.6 G/DL (ref 32–36)
MCV RBC AUTO: 99 FL (ref 82–98)
MONOCYTES # BLD AUTO: 0.6 K/UL (ref 0.3–1)
MONOCYTES NFR BLD: 5 % (ref 4–15)
NEUTROPHILS # BLD AUTO: 10.9 K/UL (ref 1.8–7.7)
NEUTROPHILS NFR BLD: 88.6 % (ref 38–73)
NRBC BLD-RTO: 0 /100 WBC
PHOSPHATE SERPL-MCNC: 3.4 MG/DL (ref 2.7–4.5)
PLATELET # BLD AUTO: 134 K/UL (ref 150–450)
PMV BLD AUTO: 12.5 FL (ref 9.2–12.9)
POTASSIUM SERPL-SCNC: 4.7 MMOL/L (ref 3.5–5.1)
PREALB SERPL-MCNC: 23 MG/DL (ref 20–43)
PROT SERPL-MCNC: 5.9 G/DL (ref 6–8.4)
PROTHROMBIN TIME: 10.5 SEC (ref 9–12.5)
RBC # BLD AUTO: 2.75 M/UL (ref 4.6–6.2)
SODIUM SERPL-SCNC: 130 MMOL/L (ref 136–145)
WBC # BLD AUTO: 12.27 K/UL (ref 3.9–12.7)

## 2022-08-22 PROCEDURE — 25000242 PHARM REV CODE 250 ALT 637 W/ HCPCS

## 2022-08-22 PROCEDURE — 82150 ASSAY OF AMYLASE: CPT | Performed by: STUDENT IN AN ORGANIZED HEALTH CARE EDUCATION/TRAINING PROGRAM

## 2022-08-22 PROCEDURE — 94640 AIRWAY INHALATION TREATMENT: CPT

## 2022-08-22 PROCEDURE — 25000003 PHARM REV CODE 250: Performed by: STUDENT IN AN ORGANIZED HEALTH CARE EDUCATION/TRAINING PROGRAM

## 2022-08-22 PROCEDURE — 94761 N-INVAS EAR/PLS OXIMETRY MLT: CPT

## 2022-08-22 PROCEDURE — 20600001 HC STEP DOWN PRIVATE ROOM

## 2022-08-22 PROCEDURE — 63600175 PHARM REV CODE 636 W HCPCS: Performed by: STUDENT IN AN ORGANIZED HEALTH CARE EDUCATION/TRAINING PROGRAM

## 2022-08-22 PROCEDURE — 85025 COMPLETE CBC W/AUTO DIFF WBC: CPT | Performed by: STUDENT IN AN ORGANIZED HEALTH CARE EDUCATION/TRAINING PROGRAM

## 2022-08-22 PROCEDURE — 93750 INTERROGATION VAD IN PERSON: CPT | Mod: ,,, | Performed by: INTERNAL MEDICINE

## 2022-08-22 PROCEDURE — 85730 THROMBOPLASTIN TIME PARTIAL: CPT | Mod: 91 | Performed by: INTERNAL MEDICINE

## 2022-08-22 PROCEDURE — 83690 ASSAY OF LIPASE: CPT | Performed by: STUDENT IN AN ORGANIZED HEALTH CARE EDUCATION/TRAINING PROGRAM

## 2022-08-22 PROCEDURE — 25000003 PHARM REV CODE 250: Performed by: INTERNAL MEDICINE

## 2022-08-22 PROCEDURE — 99900026 HC AIRWAY MAINTENANCE (STAT)

## 2022-08-22 PROCEDURE — 99232 SBSQ HOSP IP/OBS MODERATE 35: CPT | Mod: ,,, | Performed by: INTERNAL MEDICINE

## 2022-08-22 PROCEDURE — 27000248 HC VAD-ADDITIONAL DAY

## 2022-08-22 PROCEDURE — 80048 BASIC METABOLIC PNL TOTAL CA: CPT | Performed by: INTERNAL MEDICINE

## 2022-08-22 PROCEDURE — 99233 PR SUBSEQUENT HOSPITAL CARE,LEVL III: ICD-10-PCS | Mod: 25,,, | Performed by: INTERNAL MEDICINE

## 2022-08-22 PROCEDURE — 99232 PR SUBSEQUENT HOSPITAL CARE,LEVL II: ICD-10-PCS | Mod: ,,, | Performed by: INTERNAL MEDICINE

## 2022-08-22 PROCEDURE — 93750 PR INTERROGATE VENT ASSIST DEV, IN PERSON, W PHYSICIAN ANALYSIS: ICD-10-PCS | Mod: ,,, | Performed by: INTERNAL MEDICINE

## 2022-08-22 PROCEDURE — 83735 ASSAY OF MAGNESIUM: CPT | Performed by: STUDENT IN AN ORGANIZED HEALTH CARE EDUCATION/TRAINING PROGRAM

## 2022-08-22 PROCEDURE — 83615 LACTATE (LD) (LDH) ENZYME: CPT | Performed by: INTERNAL MEDICINE

## 2022-08-22 PROCEDURE — 92526 ORAL FUNCTION THERAPY: CPT

## 2022-08-22 PROCEDURE — 99233 SBSQ HOSP IP/OBS HIGH 50: CPT | Mod: 25,,, | Performed by: INTERNAL MEDICINE

## 2022-08-22 PROCEDURE — 80076 HEPATIC FUNCTION PANEL: CPT | Performed by: STUDENT IN AN ORGANIZED HEALTH CARE EDUCATION/TRAINING PROGRAM

## 2022-08-22 PROCEDURE — 99900035 HC TECH TIME PER 15 MIN (STAT)

## 2022-08-22 PROCEDURE — 86140 C-REACTIVE PROTEIN: CPT | Performed by: INTERNAL MEDICINE

## 2022-08-22 PROCEDURE — 27000221 HC OXYGEN, UP TO 24 HOURS

## 2022-08-22 PROCEDURE — 25000003 PHARM REV CODE 250

## 2022-08-22 PROCEDURE — 83880 ASSAY OF NATRIURETIC PEPTIDE: CPT | Performed by: INTERNAL MEDICINE

## 2022-08-22 PROCEDURE — 63600175 PHARM REV CODE 636 W HCPCS

## 2022-08-22 PROCEDURE — 84134 ASSAY OF PREALBUMIN: CPT | Performed by: INTERNAL MEDICINE

## 2022-08-22 PROCEDURE — 84100 ASSAY OF PHOSPHORUS: CPT | Performed by: STUDENT IN AN ORGANIZED HEALTH CARE EDUCATION/TRAINING PROGRAM

## 2022-08-22 PROCEDURE — 85384 FIBRINOGEN ACTIVITY: CPT | Performed by: THORACIC SURGERY (CARDIOTHORACIC VASCULAR SURGERY)

## 2022-08-22 PROCEDURE — 85610 PROTHROMBIN TIME: CPT | Performed by: STUDENT IN AN ORGANIZED HEALTH CARE EDUCATION/TRAINING PROGRAM

## 2022-08-22 RX ORDER — WARFARIN 3 MG/1
6 TABLET ORAL DAILY
Status: DISCONTINUED | OUTPATIENT
Start: 2022-08-22 | End: 2022-08-26

## 2022-08-22 RX ORDER — IBUPROFEN 200 MG
16 TABLET ORAL
Status: DISCONTINUED | OUTPATIENT
Start: 2022-08-22 | End: 2022-09-01 | Stop reason: HOSPADM

## 2022-08-22 RX ORDER — ONDANSETRON 2 MG/ML
4 INJECTION INTRAMUSCULAR; INTRAVENOUS EVERY 8 HOURS PRN
Status: DISCONTINUED | OUTPATIENT
Start: 2022-08-22 | End: 2022-09-01 | Stop reason: HOSPADM

## 2022-08-22 RX ORDER — ONDANSETRON 2 MG/ML
INJECTION INTRAMUSCULAR; INTRAVENOUS
Status: COMPLETED
Start: 2022-08-22 | End: 2022-08-22

## 2022-08-22 RX ORDER — MECLIZINE HYDROCHLORIDE 25 MG/1
25 TABLET ORAL 3 TIMES DAILY PRN
Status: DISCONTINUED | OUTPATIENT
Start: 2022-08-22 | End: 2022-09-01 | Stop reason: HOSPADM

## 2022-08-22 RX ADMIN — MEXILETINE HYDROCHLORIDE 400 MG: 200 CAPSULE ORAL at 10:08

## 2022-08-22 RX ADMIN — LEVALBUTEROL HYDROCHLORIDE 0.63 MG: 0.63 SOLUTION RESPIRATORY (INHALATION) at 07:08

## 2022-08-22 RX ADMIN — WARFARIN SODIUM 6 MG: 3 TABLET ORAL at 05:08

## 2022-08-22 RX ADMIN — METHIMAZOLE 5 MG: 5 TABLET ORAL at 11:08

## 2022-08-22 RX ADMIN — ONDANSETRON 4 MG: 2 INJECTION INTRAMUSCULAR; INTRAVENOUS at 10:08

## 2022-08-22 RX ADMIN — MECLIZINE HYDROCHLORIDE 25 MG: 25 TABLET ORAL at 10:08

## 2022-08-22 RX ADMIN — MIRTAZAPINE 15 MG: 15 TABLET, FILM COATED ORAL at 10:08

## 2022-08-22 RX ADMIN — HEPARIN SODIUM 19 UNITS/KG/HR: 5000 INJECTION INTRAVENOUS; SUBCUTANEOUS at 05:08

## 2022-08-22 RX ADMIN — MEXILETINE HYDROCHLORIDE 400 MG: 200 CAPSULE ORAL at 02:08

## 2022-08-22 RX ADMIN — Medication 400 MG: at 02:08

## 2022-08-22 RX ADMIN — MEXILETINE HYDROCHLORIDE 400 MG: 200 CAPSULE ORAL at 06:08

## 2022-08-22 RX ADMIN — BUSPIRONE HYDROCHLORIDE 5 MG: 5 TABLET ORAL at 10:08

## 2022-08-22 RX ADMIN — Medication 400 MG: at 10:08

## 2022-08-22 RX ADMIN — PREDNISONE 20 MG: 20 TABLET ORAL at 11:08

## 2022-08-22 RX ADMIN — AMIODARONE HYDROCHLORIDE 400 MG: 200 TABLET ORAL at 11:08

## 2022-08-22 RX ADMIN — ONDANSETRON 4 MG: 2 INJECTION INTRAMUSCULAR; INTRAVENOUS at 08:08

## 2022-08-22 RX ADMIN — ASPIRIN 81 MG CHEWABLE TABLET 81 MG: 81 TABLET CHEWABLE at 11:08

## 2022-08-22 RX ADMIN — BUSPIRONE HYDROCHLORIDE 5 MG: 5 TABLET ORAL at 02:08

## 2022-08-22 RX ADMIN — DEXTROSE 125 ML: 10 SOLUTION INTRAVENOUS at 08:08

## 2022-08-22 RX ADMIN — PANTOPRAZOLE SODIUM 40 MG: 40 GRANULE, DELAYED RELEASE ORAL at 11:08

## 2022-08-22 RX ADMIN — MECLIZINE HYDROCHLORIDE 25 MG: 25 TABLET ORAL at 05:08

## 2022-08-22 RX ADMIN — LEVALBUTEROL HYDROCHLORIDE 0.63 MG: 0.63 SOLUTION RESPIRATORY (INHALATION) at 12:08

## 2022-08-22 NOTE — PT/OT/SLP PROGRESS
Speech Language Pathology Treatment    Patient Name:  Tim Richards   MRN:  [id number]  Admitting Diagnosis: Left ventricular assist device (LVAD) complication    Recommendations:                 General Recommendations:  Dysphagia therapy and Modified barium swallow study    Diet recommendations:   Liquid Diet Level: Nectar Thick liquids including Boost  1pkt to 6oz     · Full oral diet of BOOST as per nutrition or MD recs with goal to remove Ng tube   · Boost MUST also be thickened to NECTAR thick   · No solids, purees or thin liquids at this time    Ok for ice chips for the purpose for completing swallowing exercises   2-3 x daily complete the following   1. Effortful swallow x10  2. Falsetto /i/ x10  3. Ro Maneuver x3    General Precautions: Standard, fall, LVAD, sternal  Communication strategies:  One way speaking valve    Subjective     Awake with RT upon arrival   RN in agreement with seeing pt at this time     Pain/Comfort:  Pain Rating 1: 0/10  Pain Rating Post-Intervention 1: 0/10    Respiratory Status: trach collar     Objective:     Has the patient been evaluated by SLP for swallowing?   Yes  Keep patient NPO? No     Pt upright awake and alert in bed. Pt pending MBS earlier this morning however study was postponed and rescheduled for tomorrow. SLP discussed with pt ongoing diet recs. Despite SLP recommendations pt with puree at the beside. SLP discussed with both pt and RN how puree is not appropriate at this time until repeat MBS able to be completed. SLP re-emphasized diet recs until MBS able to be repeated. All parties demonstrated understanding and agreeement with plan.  Pt with PMSV in place and pt tolerating without difficulty. SLP communicated with pt and team recommendations and updated whiteboard. All parties in agreement with plan. Pt with home exercise swallow program at the bedside within reach and reports completing multiple times daily. SLP left pt with speaking valve in place and  HOB elevated position in bed with call light within reach.  SLP will follow up.    Assessment:     Tim Richards is a 56 y.o. male with an SLP diagnosis of Dysphagia.      Goals:   Multidisciplinary Problems     SLP Goals        Problem: SLP    Goal Priority Disciplines Outcome   SLP Goal     SLP Ongoing, Progressing   Description: Speech Language Pathology Goals  Goals expected to be met by 8/18    1.Pt will participate in more objective swallow assessment via MBSS to help determine least restrictive diet   2. . Pt will tolerate PMSV for waking hours to increase phonation, respiration and swallowing aspects  3. Pt/family will don/doff PMSV x1 during session with min cues      Speech Language Pathology Goals  Goals expected to be met by 8/25/22    1. Pt will tolerate full nectar thick liquid diet without overt clinical signs of aspiration   2. Pt will participate in repeat MBS to help determine least restrictive diet   3. Pt will continue to tolerate PMSV for all waking hours                            Plan:     · Patient to be seen:  3 x/week   · Plan of Care reviewed with:  patient   · SLP Follow-Up:  Yes       Discharge recommendations:  rehabilitation facility     Time Tracking:     SLP Treatment Date:   08/22/22  Speech Start Time:  1026  Speech Stop Time:  1034     Speech Total Time (min):  8 min    Billable Minutes: Treatment Swallowing Dysfunction 8    08/22/2022

## 2022-08-22 NOTE — CARE UPDATE
RAPID RESPONSE NURSE ROUND       Rounding completed with charge RN, Claudette for LVAD BP 76/0. No additional concerns verbalized at this time. Instructed to call [phone] for further concerns or assistance.

## 2022-08-22 NOTE — PLAN OF CARE
Pt maintained free from falls/trauma/injuries and skin breakdown. Pt c/o dizziness and nausea (see previous notes). Pt refused to have drsg change last night. Lab drawn. Heparin decreased to 21 units/kg/hr. Plan of care reviewed. Pt verbalized understanding. All questions and concerns addressed. Will continue to monitor.

## 2022-08-22 NOTE — ASSESSMENT & PLAN NOTE
Key History and Diagnostic Findings  - Initially hyperglycemia noted once starting TPN and later TF in addition to steroids and critical illness when in the ICU  - No prior history of diabetes; most recent A1c of 5.4 on 04/26/2021  - With decreasing steroid dose and overall clinical improvement as well as poor po intake, now with mild asymptomatic hypoglycemia intermittently.    Plan  - Continue accuchecks ACHS and would optimize nutrition, attempting po,although insufficient po intake currently  - Please notify endocrine if any change in diet or tube feeds as this will change insulin requirements.  - discontinued insulin, not requiring sliding scale correction and has been hypoglycemic intermittently

## 2022-08-22 NOTE — ASSESSMENT & PLAN NOTE
-Previously on HM2 but complicated by thrombosis related to COVID  -Underwent HM III upgrade on 7/13/22, currently on [facility] ECMO  -Volume status: currently appears slightly volume overloaded but currently not tolerating any PO intake, therefore will not increase or add diuretics  -Will monitor overnight and reassess fluid status in the morning  -Chest tube removal, bronch, and old driveline removed 7/22  -Starting to become hyponatremic, will monitor I/Os closely as well as daily electrolytes  -Currently being bridged with heparin, continue coumadin, currently on dose of 6mg, INR of 1.0 today

## 2022-08-22 NOTE — PROGRESS NOTES
[facility]  Endocrinology  Progress Note    Admit Date: 6/27/2022     56 year-old  male with NICM with LVAD, s/p ICD for VT on amiodarone and mexiletine and AIT followed by hypothyroidism initially admitted 6/27/2022 with COVID respiratory failure complicated with LVAD pump thrombosis that was refractory to medical therapy. Underwent LVAD pump exchange. Post-op course complicated by worsening cardiogenic shock/RV failure requiring [facility]-ECMO. Improved clinically underwent successful decannulation. Continued episodes of VT with ICD shock 7/21 and ongoing anti-arrhythmic mediation adjustments. TFTs on 7/27 significant for recurrent hyperthyroidism with undetectable TSH and elevated fT4.    - Patient re-intubated 07/29/2022    - Endocrinology consulted for recurrent AIT    Regarding AIT:    - Last seen outpatient by Dr. 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 He required a prolonged course of prednisone and methimazole, then subsequently developed hypothyroidism in May 2020. LT4 was adjusted based on serial TFT measurements. Most recently levothyroxine dose has been 112 mcg     - He self-decreased his amiodarone dose to 200 mg daily about 6 months ago. T4 was high on 7/13, but he was continued on levothyroxine 112 mcg    Lab Results   Component Value Date    TSH <0.010 (L) 07/27/2022    TSH 0.111 (L) 07/13/2022    TSH 4.079 (H) 03/17/2022    FREET4 2.51 (H) 08/07/2022    FREET4 2.95 (H) 08/06/2022    FREET4 2.18 (H) 08/05/2022      Latest Reference Range & Units 08/07/22 03:42 08/08/22 03:10 08/09/22 03:29 08/10/22 03:33   Free T4 0.71 - 1.51 ng/dL 2.51 (H) 2.43 (H) 2.23 (H) 2.12 (H)       Interval HPI:   Overnight events: symptomatic hypoglycemic this AM, treated with IV dextrose.   Eating:   <25%, full liquid diet with nectar thick liquids.   Nausea: Yes, received zofran.   Hypoglycemia and intervention:  "Yes, BG 69 this AM. IV dextrose. secondary to patient not eating, does not like the food.   Fever: No  TPN and/or TF: No      BP (!) 76/0 (BP Location: Left arm, Patient Position: Lying)   Pulse 70   Temp 97.5 °F (36.4 °C) (Oral)   Resp 19   Ht 6' 1" (1.854 m)   Wt 89 kg (196 lb 3.4 oz)   SpO2 97%   BMI 25.89 kg/m²     Labs Reviewed and Include    Recent Labs   Lab 08/22/22  0510   GLU 69*   CALCIUM 8.9   ALBUMIN 2.1*   PROT 5.9*   *   K 4.7   CO2 26      BUN 21*   CREATININE 1.1   ALKPHOS 182*   ALT 58*   AST 34   BILITOT 1.6*     Lab Results   Component Value Date    WBC 12.27 08/22/2022    HGB 7.8 (L) 08/22/2022    HCT 27.3 (L) 08/22/2022    MCV 99 (H) 08/22/2022     (L) 08/22/2022     Recent Labs   Lab 08/19/22  0523 08/21/22  0444   TSH  --  3.940   FREET4 0.98 0.93     Lab Results   Component Value Date    HGBA1C 5.4 04/26/2021       Nutritional status:   Body mass index is 25.89 kg/m².  Lab Results   Component Value Date    ALBUMIN 2.1 (L) 08/22/2022    ALBUMIN 2.1 (L) 08/21/2022    ALBUMIN 2.2 (L) 08/20/2022     Lab Results   Component Value Date    PREALBUMIN 23 08/22/2022    PREALBUMIN 30 08/19/2022    PREALBUMIN 32 08/11/2022       Estimated Creatinine Clearance: 85.8 mL/min (based on SCr of 1.1 mg/dL).    Accu-Checks  Recent Labs     08/19/22  1146 08/19/22  1551 08/19/22  2302 08/20/22 2005 08/21/22  1234 08/21/22  1521   POCTGLUCOSE 88 159* 94 81 69* 137*       Current Medications and/or Treatments Impacting Glycemic Control  Immunotherapy:    Immunosuppressants       None          Steroids:   Hormones (From admission, onward)                Start     Stop Route Frequency Ordered    08/19/22 0900  predniSONE tablet 20 mg         -- Oral Daily 08/18/22 1620    08/02/22 1931  melatonin tablet 6 mg         -- OG Nightly PRN 08/02/22 1832          Pressors:    Autonomic Drugs (From admission, onward)                Start     Stop Route Frequency Ordered    07/29/22 0011  " EPINEPHrine (ADRENALIN) 1 mg/mL injection        Note to Pharmacy: Created by cabinet override    07/29 1214   07/29/22 0011          Hyperglycemia/Diabetes Medications:   Antihyperglycemics (From admission, onward)                None            ASSESSMENT and PLAN    Amiodarone-induced hyperthyroidism  Key History and Diagnostic Findings  - History of AIT type 2 (TRAb and TSI negative; Thyroid US unremarkable with low vascularity)  - Weaned off methimazole and prednisone by May 2020, then developed hypothyroidism, Levothyroxine started and dose titrated per TFTs. Prior to current admission he was on Levothyroxine 112 mcg daily  - Labs consistent with hypothyroidism until current admission, initially on 7/13, with low TSH and elevated fT4. Repeat TFTs on 7/27 with worsening hyperthyroidism. He continued to receive LT4 112 mcg through 7/28. He remains on amiodarone for episodes of Ventricular Tachycardia  - Suspect current hyperthyroidism is likely due to AIT-2, however continued exogenous LT4 certainly contribute to current presentation   - fT4 now normal    Lab Results   Component Value Date    TSH 3.940 08/21/2022    TSH 0.127 (L) 08/14/2022    TSH <0.010 (L) 07/27/2022    FREET4 0.93 08/21/2022    FREET4 0.98 08/19/2022    FREET4 1.24 08/16/2022     Plan  - Strongly suspect AIT type 2 with improvement seen currently with the use of prednisone.   - Will continue methimazole 5mg daily and prednisone 20 mg daily for now. May need to be discharged on both pending clinical presentation and labs vs discontinuation of methimazole and prednisone tapering while inpatient if FT4 continues to downtrend   - Trend TFTs q48-72h   - If FT4 continues to down trend and concerns of hypothyroidism arise will stop methimazole and will start slow taper of steroids.        amiodarone induced hypothyrodism  - Levothyroxine discontinued on 7/28/2022  - Please see plan as above    VT (ventricular tachycardia)  Recurrent VT requiring  mexiletine and chronic amiodarone   Managed per primary     Hypoglycemia  Key History and Diagnostic Findings  - Initially hyperglycemia noted once starting TPN and later TF in addition to steroids and critical illness when in the ICU  - No prior history of diabetes; most recent A1c of 5.4 on 04/26/2021  - With decreasing steroid dose and overall clinical improvement as well as poor po intake, now with mild asymptomatic hypoglycemia intermittently.    Plan  - Continue accuchecks ACHS and would optimize nutrition, attempting po,although insufficient po intake currently  - Please notify endocrine if any change in diet or tube feeds as this will change insulin requirements.  - discontinued insulin, not requiring sliding scale correction and has been hypoglycemic intermittently      LVAD (left ventricular assist device) present  S/p HM3 implant   Managed per primary       Chronic combined systolic and diastolic heart failure  S/p LVAD  Managed per cardiology          Cortney Toth NP  Endocrinology  [facility]

## 2022-08-22 NOTE — SUBJECTIVE & OBJECTIVE
Interval History: Patient reports nausea overnight that started yesterday and has been persistent.  Has not had much of an appetite due to the texture of the nectar feeds being not palatable.  Reports he has not been drinking much fluid either.  He denies any vomiting or bowel movement issues, reports two moderate volume well formed stools overnight.  Denies diarrhea.  Does not report any chest discomfort or shortness of breath.  Planned for MBS today.  Otherwise no acute events overnight.    Hemodynamics:   Flow rate of 5.4-5.6L/min, LVAD set at 6300 rpm    Continuous Infusions:   dextrose 10 % in water (D10W)      heparin (porcine) in D5W 21 Units/kg/hr (08/22/22 0641)     Scheduled Meds:   amiodarone  400 mg Oral Daily    aspirin  81 mg Oral Daily    busPIRone  5 mg Oral TID    levalbuterol  0.63 mg Nebulization Q6H    magnesium oxide  400 mg Oral BID    methIMAzole  5 mg Oral Daily    mexiletine  400 mg Oral Q8H    mirtazapine  15 mg Oral QHS    ondansetron  4 mg Intravenous Once    pantoprazole  40 mg Oral Daily    polyethylene glycol  17 g Oral Daily    predniSONE  20 mg Oral Daily    senna-docusate 8.6-50 mg  1 tablet Oral Daily    warfarin  6 mg Oral Daily     PRN Meds:sodium chloride 0.9%, acetaminophen, ALPRAZolam, barium sulfate, barium sulfate, bisacodyL, dextrose 10 % in water (D10W), dextrose 10%, dextrose 10%, glucagon (human recombinant), glucose, guaiFENesin 100 mg/5 ml, HYDROmorphone, HYDROmorphone, hydrOXYzine HCL, melatonin, ondansetron    Review of patient's allergies indicates:   Allergen Reactions    Lisinopril Anaphylaxis    Hydralazine analogues      Chronic constipation, impotence, dizziness     Objective:     Vital Signs (Most Recent):  Temp: 97.5 °F (36.4 °C) (08/22/22 1132)  Pulse: 68 (08/22/22 1132)  Resp: 20 (08/22/22 1132)  BP: (!) 76/0 (08/22/22 0825)  SpO2: 95 % (08/22/22 1132)   Vital Signs (24h Range):  Temp:  [97.1 °F (36.2 °C)-97.9 °F (36.6 °C)] 97.5 °F (36.4 °C)  Pulse:   [62-88] 68  Resp:  [18-35] 20  SpO2:  [88 %-99 %] 95 %  BP: (70-84)/(0) 76/0     Patient Vitals for the past 72 hrs (Last 3 readings):   Weight   08/21/22 0500 89 kg (196 lb 3.4 oz)   08/20/22 0626 89 kg (196 lb 3.2 oz)     Body mass index is 25.89 kg/m².      Intake/Output Summary (Last 24 hours) at 8/22/2022 1154  Last data filed at 8/22/2022 0700  Gross per 24 hour   Intake 760 ml   Output 1175 ml   Net -415 ml       Hemodynamic Parameters:       Telemetry: No acute events overnight    Physical Exam  Constitutional:       General: He is not in acute distress.     Appearance: He is ill-appearing.   HENT:      Head: Normocephalic and atraumatic.      Nose: Nose normal.      Mouth/Throat:      Mouth: Mucous membranes are dry.      Comments: Tracheostomy site is clean and dry without drainage  Eyes:      Extraocular Movements: Extraocular movements intact.      Conjunctiva/sclera: Conjunctivae normal.      Pupils: Pupils are equal, round, and reactive to light.   Cardiovascular:      Pulses: Normal pulses.      Heart sounds: Normal heart sounds.      Comments: VAD hum can be heard  Pulmonary:      Effort: Pulmonary effort is normal.      Breath sounds: Normal breath sounds.   Abdominal:      General: Abdomen is flat. Bowel sounds are normal. There is no distension.      Palpations: Abdomen is soft.      Tenderness: There is no abdominal tenderness. There is no guarding.   Musculoskeletal:         General: Normal range of motion.   Skin:     General: Skin is warm and dry.   Neurological:      Mental Status: He is alert.       Significant Labs:  CBC:  Recent Labs   Lab 08/21/22  0444 08/21/22  2321 08/22/22  0510   WBC 12.62 15.58* 12.27   RBC 2.75* 2.89* 2.75*   HGB 7.7* 7.9* 7.8*   HCT 27.4* 27.4* 27.3*   * 153 134*   * 95 99*   MCH 28.0 27.3 28.4   MCHC 28.1* 28.8* 28.6*     BNP:  Recent Labs   Lab 08/17/22  0343 08/19/22  0523 08/22/22  0510   * 482*  482* 886*     CMP:  Recent Labs   Lab  08/20/22  0430 08/21/22  0444 08/22/22  0510   GLU 68* 67* 69*   CALCIUM 9.0 8.9 8.9   ALBUMIN 2.2* 2.1* 2.1*   PROT 6.0 5.8* 5.9*    135* 130*   K 5.0 4.7 4.7   CO2 24 24 26    105 100   BUN 32* 24* 21*   CREATININE 1.1 1.0 1.1   ALKPHOS 225* 208* 182*   ALT 64* 58* 58*   AST 43* 37 34   BILITOT 1.8* 1.7* 1.6*      Coagulation:   Recent Labs   Lab 08/20/22  0430 08/20/22  1514 08/21/22  0444 08/22/22  0510   INR 1.0 1.0 1.0 1.0   APTT 47.6*  --  50.6* 56.1*     LDH:  Recent Labs   Lab 08/20/22  0430 08/21/22  0444 08/22/22  0510   * 314* 282*     Microbiology:  Microbiology Results (last 7 days)       Procedure Component Value Units Date/Time    Culture, Anaerobe [[id number]] Collected: 08/09/22 1347    Order Status: Completed Specimen: Incision site from Abdomen Updated: 08/16/22 0711     Anaerobic Culture No anaerobes isolated    Narrative:      DL site            I have reviewed all pertinent labs within the past 24 hours.    Estimated Creatinine Clearance: 85.8 mL/min (based on SCr of 1.1 mg/dL).    Diagnostic Results:  I have reviewed all pertinent imaging results/findings within the past 24 hours.

## 2022-08-22 NOTE — ASSESSMENT & PLAN NOTE
Key History and Diagnostic Findings  - History of AIT type 2 (TRAb and TSI negative; Thyroid US unremarkable with low vascularity)  - Weaned off methimazole and prednisone by May 2020, then developed hypothyroidism, Levothyroxine started and dose titrated per TFTs. Prior to current admission he was on Levothyroxine 112 mcg daily  - Labs consistent with hypothyroidism until current admission, initially on 7/13, with low TSH and elevated fT4. Repeat TFTs on 7/27 with worsening hyperthyroidism. He continued to receive LT4 112 mcg through 7/28. He remains on amiodarone for episodes of Ventricular Tachycardia  - Suspect current hyperthyroidism is likely due to AIT-2, however continued exogenous LT4 certainly contribute to current presentation   - fT4 now normal    Lab Results   Component Value Date    TSH 3.940 08/21/2022    TSH 0.127 (L) 08/14/2022    TSH <0.010 (L) 07/27/2022    FREET4 0.93 08/21/2022    FREET4 0.98 08/19/2022    FREET4 1.24 08/16/2022     Plan  - Strongly suspect AIT type 2 with improvement seen currently with the use of prednisone.   - Will continue methimazole 5mg daily and prednisone 20 mg daily for now. May need to be discharged on both pending clinical presentation and labs vs discontinuation of methimazole and prednisone tapering while inpatient if FT4 continues to downtrend   - Trend TFTs q48-72h   - If FT4 continues to down trend and concerns of hypothyroidism arise will stop methimazole and will start slow taper of steroids.

## 2022-08-22 NOTE — NURSING
Rapid rounded on Pt. Pt c/o continuous dizziness which worsen when laying flat or turning. Pt O2 reading 88% and is on 11 L of Oxygen. MD Huddleston notified and instructed to pause the NS. MD came to bedside and assessed pt, instructed RN to restart NS and keep monitoring pt.

## 2022-08-22 NOTE — PROGRESS NOTES
[facility] - [facility] Step[facility]  Heart Transplant  Progress Note    Patient Name: Tim Richards  MRN: [id number]  Admission Date: 6/27/2022  Hospital Length of Stay: 56 days  Attending Physician: Roslyn Ragsdale DO  Primary Care Provider: Deyanira Booth MD  Principal Problem:Left ventricular assist device (LVAD) complication    Subjective:     Interval History: Patient reports nausea overnight that started yesterday and has been persistent.  Has not had much of an appetite due to the texture of the nectar feeds being not palatable.  Reports he has not been drinking much fluid either.  He denies any vomiting or bowel movement issues, reports two moderate volume well formed stools overnight.  Denies diarrhea.  Does not report any chest discomfort or shortness of breath.  Planned for MBS today.  Otherwise no acute events overnight.    Hemodynamics:   Flow rate of 5.4-5.6L/min, LVAD set at 6300 rpm    Continuous Infusions:   dextrose 10 % in water (D10W)      heparin (porcine) in D5W 21 Units/kg/hr (08/22/22 0641)     Scheduled Meds:   amiodarone  400 mg Oral Daily    aspirin  81 mg Oral Daily    busPIRone  5 mg Oral TID    levalbuterol  0.63 mg Nebulization Q6H    magnesium oxide  400 mg Oral BID    methIMAzole  5 mg Oral Daily    mexiletine  400 mg Oral Q8H    mirtazapine  15 mg Oral QHS    ondansetron  4 mg Intravenous Once    pantoprazole  40 mg Oral Daily    polyethylene glycol  17 g Oral Daily    predniSONE  20 mg Oral Daily    senna-docusate 8.6-50 mg  1 tablet Oral Daily    warfarin  6 mg Oral Daily     PRN Meds:sodium chloride 0.9%, acetaminophen, ALPRAZolam, barium sulfate, barium sulfate, bisacodyL, dextrose 10 % in water (D10W), dextrose 10%, dextrose 10%, glucagon (human recombinant), glucose, guaiFENesin 100 mg/5 ml, HYDROmorphone, HYDROmorphone, hydrOXYzine HCL, melatonin, ondansetron    Review of patient's allergies indicates:   Allergen Reactions    Lisinopril Anaphylaxis     Hydralazine analogues      Chronic constipation, impotence, dizziness     Objective:     Vital Signs (Most Recent):  Temp: 97.5 °F (36.4 °C) (08/22/22 1132)  Pulse: 68 (08/22/22 1132)  Resp: 20 (08/22/22 1132)  BP: (!) 76/0 (08/22/22 0825)  SpO2: 95 % (08/22/22 1132)   Vital Signs (24h Range):  Temp:  [97.1 °F (36.2 °C)-97.9 °F (36.6 °C)] 97.5 °F (36.4 °C)  Pulse:  [62-88] 68  Resp:  [18-35] 20  SpO2:  [88 %-99 %] 95 %  BP: (70-84)/(0) 76/0     Patient Vitals for the past 72 hrs (Last 3 readings):   Weight   08/21/22 0500 89 kg (196 lb 3.4 oz)   08/20/22 0626 89 kg (196 lb 3.2 oz)     Body mass index is 25.89 kg/m².      Intake/Output Summary (Last 24 hours) at 8/22/2022 1154  Last data filed at 8/22/2022 0700  Gross per 24 hour   Intake 760 ml   Output 1175 ml   Net -415 ml       Hemodynamic Parameters:       Telemetry: No acute events overnight    Physical Exam  Constitutional:       General: He is not in acute distress.     Appearance: He is ill-appearing.   HENT:      Head: Normocephalic and atraumatic.      Nose: Nose normal.      Mouth/Throat:      Mouth: Mucous membranes are dry.      Comments: Tracheostomy site is clean and dry without drainage  Eyes:      Extraocular Movements: Extraocular movements intact.      Conjunctiva/sclera: Conjunctivae normal.      Pupils: Pupils are equal, round, and reactive to light.   Cardiovascular:      Pulses: Normal pulses.      Heart sounds: Normal heart sounds.      Comments: VAD hum can be heard  Pulmonary:      Effort: Pulmonary effort is normal.      Breath sounds: Normal breath sounds.   Abdominal:      General: Abdomen is flat. Bowel sounds are normal. There is no distension.      Palpations: Abdomen is soft.      Tenderness: There is no abdominal tenderness. There is no guarding.   Musculoskeletal:         General: Normal range of motion.   Skin:     General: Skin is warm and dry.   Neurological:      Mental Status: He is alert.       Significant Labs:  CBC:  Recent  Labs   Lab 08/21/22  0444 08/21/22  2321 08/22/22  0510   WBC 12.62 15.58* 12.27   RBC 2.75* 2.89* 2.75*   HGB 7.7* 7.9* 7.8*   HCT 27.4* 27.4* 27.3*   * 153 134*   * 95 99*   MCH 28.0 27.3 28.4   MCHC 28.1* 28.8* 28.6*     BNP:  Recent Labs   Lab 08/17/22  0343 08/19/22  0523 08/22/22  0510   * 482*  482* 886*     CMP:  Recent Labs   Lab 08/20/22  0430 08/21/22  0444 08/22/22  0510   GLU 68* 67* 69*   CALCIUM 9.0 8.9 8.9   ALBUMIN 2.2* 2.1* 2.1*   PROT 6.0 5.8* 5.9*    135* 130*   K 5.0 4.7 4.7   CO2 24 24 26    105 100   BUN 32* 24* 21*   CREATININE 1.1 1.0 1.1   ALKPHOS 225* 208* 182*   ALT 64* 58* 58*   AST 43* 37 34   BILITOT 1.8* 1.7* 1.6*      Coagulation:   Recent Labs   Lab 08/20/22  0430 08/20/22  1514 08/21/22  0444 08/22/22  0510   INR 1.0 1.0 1.0 1.0   APTT 47.6*  --  50.6* 56.1*     LDH:  Recent Labs   Lab 08/20/22  0430 08/21/22  0444 08/22/22  0510   * 314* 282*     Microbiology:  Microbiology Results (last 7 days)       Procedure Component Value Units Date/Time    Culture, Anaerobe [[id number]] Collected: 08/09/22 1347    Order Status: Completed Specimen: Incision site from Abdomen Updated: 08/16/22 0711     Anaerobic Culture No anaerobes isolated    Narrative:      DL site            I have reviewed all pertinent labs within the past 24 hours.    Estimated Creatinine Clearance: 85.8 mL/min (based on SCr of 1.1 mg/dL).    Diagnostic Results:  I have reviewed all pertinent imaging results/findings within the past 24 hours.    Assessment and Plan:     57 Y/O M with Hx of stage D HFrEF (EF 10%) due to NICM underwent HM2 implant 3/8/18 and exchange of outflow graft 3/9/18, Hx VT/VF s/p SICD 2014 presenting with gradual worsening shortness of breath associated with nonpleuritic, nonradiating bilateral 4/10 retrosternal chest pressure pain.  Symptoms began yesterday and have gradually worsened in the last 12 hours.  He does report going to a family picnic with  increased consumption of chicken wings, ribs and other salty meat products.  Prior to symptom onset, he reports nausea, nonbloody diarrhea began yesterday and single episode of nonbloody nonbilious vomiting this morning. He also complains of dizziness, lightheadedness, and a mild HA. SOB worsened this morning prompting him to come to the ED.     In the ED, patient was in respiratory distress requiring BiPAP. MAP 96 and started on Nitro gtt. Work-up revealed WBC 10, K 5.4, Cr 2.5 (baseline 1.9), BNP 1950 (baseline 200-300s), Bili 2.1, LDH 1058, and INR 3.2. Bedside TTE with severely reduced EF, AV not opening, septum bulging into RV. Given IV Lasix 80 mg x1 with only 100-200 mL UOP and dark in color. HM2 interrogated and flows have been 8.5-9 L/min with no alarms or power surges. Cardiology consulted in the ED, dicussed with HTS, and decision made to admit to ICU for further mgmt.       * Left ventricular assist device (LVAD) complication  The patient presented with COVID and found to have an uptrending LDH with increased power.  He underwent HM III upgrade on 7/13/22  -Daily LDH   -Continue Heparin drip    Procedure: Device Interrogation Including analysis of device parameters  Current Settings: Ventricular Assist Device    TXP LVAD INTERROGATIONS 7/29/2022 7/29/2022 7/29/2022 7/29/2022 7/29/2022 7/29/2022 7/29/2022   Type HeartMate3 HeartMate3 HeartMate3 HeartMate3 HeartMate3 HeartMate3 HeartMate3   Flow 5.2 5.1 5.0 5.0 5.1 5.2 5.1   Speed 6000 6000 6000 6000 6000 6000 6000   PI 3.5 3.4 3.4 3.4 3.4 3.5 2.4   Power (Jackson) 4.8 4.8 4.7 4.7 4.7 4.7 5.5   LSL 5600 5600 5600 5600 5600 5600 6000   Low Flow Alarm - - - - - - -   Pulsatility - Intermittent pulse Intermittent pulse Intermittent pulse Intermittent pulse Intermittent pulse Intermittent pulse       Acute hypercapnic respiratory failure  Currently requiring tracheostomy, tolerating well  MBSS planned for tomorrow    History of ventricular tachycardia  Patient  experienced an episode of VT on 6/30 and experienced an ICD shock thought to be related to hypokalemia (K of 3.1 on 6/30)  - Aggressively replete K, keep K>4 and Mg>2  - Continue mexiletine PO, lidocaine gtt, and amiodarone gtt   - EP signed off and recommending Amiodarone and Mexilitine, so would try to wean Lidocaine gtt if able  - Continue to monitor on telemetry    COVID-19 virus infection  -Previously hypoxic then recovered  -ID was consulted, recommended Remdesivir, course completed    Elevated serum lactate dehydrogenase (LDH)  S/p HM3 exchange for HM2 pump thrombosis  Trend LDH daily    amiodarone induced hypothyrodism  -Continue levothyroxine 112 mcg     Chronic combined systolic and diastolic heart failure  -Previously on HM2 but complicated by thrombosis related to COVID  -Underwent HM III upgrade on 7/13/22, currently on VA ECMO  -Volume status: currently appears slightly volume overloaded but currently not tolerating any PO intake, therefore will not increase or add diuretics  -Will monitor overnight and reassess fluid status in the morning  -Chest tube removal, bronch, and old driveline removed 7/22  -Starting to become hyponatremic, will monitor I/Os closely as well as daily electrolytes  -Currently being bridged with heparin, continue coumadin, currently on dose of 6mg, INR of 1.0 today        Nic Torres MD  Heart Transplant  [facility] - [facility] Step[facility]

## 2022-08-22 NOTE — ASSESSMENT & PLAN NOTE
Procedure: Device Interrogation Including analysis of device parameters  Current Settings: Ventricular Assist Device  Review of device function is stable    TXP LVAD INTERROGATIONS 8/22/2022 8/22/2022 8/22/2022 8/21/2022 8/21/2022 8/21/2022 8/21/2022   Type HeartMate3 HeartMate3 HeartMate3 HeartMate3 HeartMate3 HeartMate3 HeartMate3   Flow 5.2 5.5 5.6 5.2 5.1 5.4 5.4   Speed 6300 6300 6300 6300 6300 6300 6300   PI 3.2 3.0 1.8 3.7 4 3.2 2.9   Power (Jackson) 5.0 5.1 5.4 5.3 5.2 5.4 5.3   LSL - 5900 5900 5900 5900 5900 5900   Low Flow Alarm - - - - - - -   Pulsatility - - - - - - -

## 2022-08-22 NOTE — SUBJECTIVE & OBJECTIVE
"Interval HPI:   Overnight events: symptomatic hypoglycemic this AM, treated with IV dextrose.   Eating:   <25%, full liquid diet with nectar thick liquids.   Nausea: Yes, received zofran.   Hypoglycemia and intervention: Yes, BG 69 this AM. IV dextrose. secondary to patient not eating, does not like the food.   Fever: No  TPN and/or TF: No      BP (!) 76/0 (BP Location: Left arm, Patient Position: Lying)   Pulse 70   Temp 97.5 °F (36.4 °C) (Oral)   Resp 19   Ht 6' 1" (1.854 m)   Wt 89 kg (196 lb 3.4 oz)   SpO2 97%   BMI 25.89 kg/m²     Labs Reviewed and Include    Recent Labs   Lab 08/22/22  0510   GLU 69*   CALCIUM 8.9   ALBUMIN 2.1*   PROT 5.9*   *   K 4.7   CO2 26      BUN 21*   CREATININE 1.1   ALKPHOS 182*   ALT 58*   AST 34   BILITOT 1.6*     Lab Results   Component Value Date    WBC 12.27 08/22/2022    HGB 7.8 (L) 08/22/2022    HCT 27.3 (L) 08/22/2022    MCV 99 (H) 08/22/2022     (L) 08/22/2022     Recent Labs   Lab 08/19/22  0523 08/21/22  0444   TSH  --  3.940   FREET4 0.98 0.93     Lab Results   Component Value Date    HGBA1C 5.4 04/26/2021       Nutritional status:   Body mass index is 25.89 kg/m².  Lab Results   Component Value Date    ALBUMIN 2.1 (L) 08/22/2022    ALBUMIN 2.1 (L) 08/21/2022    ALBUMIN 2.2 (L) 08/20/2022     Lab Results   Component Value Date    PREALBUMIN 23 08/22/2022    PREALBUMIN 30 08/19/2022    PREALBUMIN 32 08/11/2022       Estimated Creatinine Clearance: 85.8 mL/min (based on SCr of 1.1 mg/dL).    Accu-Checks  Recent Labs     08/19/22  1146 08/19/22  1551 08/19/22  2302 08/20/22  2005 08/21/22  1234 08/21/22  1521   POCTGLUCOSE 88 159* 94 81 69* 137*       Current Medications and/or Treatments Impacting Glycemic Control  Immunotherapy:    Immunosuppressants       None          Steroids:   Hormones (From admission, onward)                Start     Stop Route Frequency Ordered    08/19/22 0900  predniSONE tablet 20 mg         -- Oral Daily 08/18/22 1620    " 08/02/22 1931  melatonin tablet 6 mg         -- OG Nightly PRN 08/02/22 1832          Pressors:    Autonomic Drugs (From admission, onward)                Start     Stop Route Frequency Ordered    07/29/22 0011  EPINEPHrine (ADRENALIN) 1 mg/mL injection        Note to Pharmacy: Created by cabinet override    07/29 1214   07/29/22 0011          Hyperglycemia/Diabetes Medications:   Antihyperglycemics (From admission, onward)                None

## 2022-08-22 NOTE — PROGRESS NOTES
Update    Pt presents as AAO x4, calm, cooperative, and asking and answering questions appropriately, caregiver is not at bedside. Pt states he is doing fine but having some stomach issues which the team is aware of. Pt states he has no additional needs at this time. LCSW offered support and held space for Pt. SW providing ongoing psychosocial, counseling, & emotional support, education, resources, assistance, and discharge planning as indicated.  SW to continue to follow.

## 2022-08-22 NOTE — RESPIRATORY THERAPY
[facility] RESPIRATORY THERAPY PROACTIVE NOTE           Time of visit: 825    Code Status: Full Code   : 1965  Bed: 311/311 A:   MRN: [id number]  Time spent at the bedside: < 15 min    SITUATION    Evaluated patient for: LDA Check     BACKGROUND    Patient has a past medical history of Anticoagulant long-term use, CHF (congestive heart failure), Class 1 obesity due to excess calories with serious comorbidity and body mass index (BMI) of 31.0 to 31.9 in adult, Dilated cardiomyopathy, Disorder of kidney and ureter, Encounter for blood transfusion, Gout, HTN (hypertension), psychiatric care, ICD (implantable cardioverter-defibrillator) infection, Psychiatric problem, Thyroid disease, and Ventricular tachycardia (paroxysmal).  Clinically Significant Surgical Hx: tracheostomy    24 Hours Vitals Range:  Temp:  [97.1 °F (36.2 °C)-99.3 °F (37.4 °C)]   Pulse:  [62-88]   Resp:  [18-35]   BP: (70-84)/(0)   SpO2:  [88 %-99 %]     Labs:    Recent Labs     22  0430 22  1514 22  0444 22  0510     --  135* 130*   K 5.0  --  4.7 4.7     --  105 100   CO2 24  --  24 26   CREATININE 1.1  --  1.0 1.1   GLU 68*  --  67* 69*   PHOS 2.3* 2.7 2.8 3.4   MG 2.4 2.2 2.2 2.2        No results for input(s): PH, PCO2, PO2, HCO3, POCSATURATED, BE in the last 72 hours.    ASSESSMENT/INTERVENTIONS  Pt resting and has no resp concerns at this time   All trach supplies are at bedside      Last VS   Temp: 97.5 °F (36.4 °C) (825)  Pulse: 70 (825)  Resp: 19 (825)  BP: 76/0 (825)  SpO2: 97 % (825)      Extra trachs at bedside: 6.0 and 8.0 shiley cuffed  Level of Consciousness: Level of Consciousness (AVPU): alert  Respiratory Effort: Respiratory Effort: Normal, Unlabored Expansion/Accessory Muscle Usage: Expansion/Accessory Muscles/Retractions: no use of accessory muscles, no retractions, expansion symmetric  All Lung Field Breath Sounds: All Lung Fields Breath Sounds:  coarse, crackles  SHERWIN Breath Sounds: equal bilaterally, clear, diminished  LLL Breath Sounds: diminished  RUL Breath Sounds: clear  RML Breath Sounds: diminished  RLL Breath Sounds: diminished  O2 Device/Concentration: TC 8L 400%  Surgical airway: Yes, Type: Shiley Size: 8, cuffed  Ambu at bedside: Ambu bag with the patient?: Yes, Adult Ambu     Active Orders   Respiratory Care    Chest physiotherapy Q6H PRN     Frequency: Q6H PRN     Number of Occurrences: Until Specified     Order Questions:      Indications: COPIOUS SPUTUM PRODUCTION    Inhalation Treatment Once     Frequency: Once     Number of Occurrences: 1 Occurrences    Inhalation Treatment Q6H     Frequency: Q6H     Number of Occurrences: Until Specified    Oxygen Continuous     Frequency: Continuous     Number of Occurrences: Until Specified     Order Questions:      Device type: Low flow      Device: Trach Collar      FiO2%: 40      Titrate O2 per Oxygen Titration Protocol: Yes      To maintain SpO2 goal of: >= 90%      Notify MD of: Inability to achieve desired SpO2; Sudden change in patient status and requires 20% increase in FiO2; Patient requires >60% FiO2    Pulse Oximetry Q4H     Frequency: Q4H     Number of Occurrences: Until Specified    Routine tracheostomy care     Frequency: BID     Number of Occurrences: Until Specified       RECOMMENDATIONS    We recommend: RRT Recs: Continue POC per primary team.      FOLLOW-UP    Please call back the Rapid Response RTLaura RRT at x [phone] for any questions or concerns.

## 2022-08-22 NOTE — PROGRESS NOTES
08/22/2022  Estephania Martinez    Current provider:  Roslyn Ragsdale DO    Device interrogation:  TXP LVAD INTERROGATIONS 8/22/2022 8/22/2022 8/22/2022 8/21/2022 8/21/2022 8/21/2022 8/21/2022   Type HeartMate3 HeartMate3 HeartMate3 HeartMate3 HeartMate3 HeartMate3 HeartMate3   Flow 5.2 5.5 5.6 5.2 5.1 5.4 5.4   Speed 6300 6300 6300 6300 6300 6300 6300   PI 3.2 3.0 1.8 3.7 4 3.2 2.9   Power (Jackson) 5.0 5.1 5.4 5.3 5.2 5.4 5.3   LSL - 5900 5900 5900 5900 5900 5900   Low Flow Alarm - - - - - - -   Pulsatility - - - - - - -          Rounded on Tim Richards to ensure all mechanical assist device settings (IABP or VAD) were appropriate and all parameters were within limits.  I was able to ensure all back up equipment was present, the staff had no issues, and the Perfusion Department daily rounding was complete.      For implantable VADs: Interrogation of Ventricular assist device was performed with analysis of device parameters and review of device function. I have personally reviewed the interrogation findings and agree with findings as stated.     In emergency, the nursing units have been notified to contact the perfusion department either by:  Calling [phone] from 630am to 4pm Mon thru Fri, utilizing the On-Call Finder functionality of Epic and searching for Perfusion, or by contacting the hospital  from 4pm to 630am and on weekends and asking to speak with the perfusionist on call.    2:41 PM

## 2022-08-22 NOTE — NURSING
Pt reported dizziness getting worse, pt is tachypnea, and sweating, stating that he feels like he is about to pass out. MD Huddleston notified and came to the bedside. NS paused. Pt asked for nausea med, one time IVP zofran given. Will keep monitor pt.

## 2022-08-22 NOTE — NURSING
1000 Notified Dr Torres that pt remains nauseated but it did improve some.     1050 Dr Torres ordered additional dose of zofran could be given as the team wishes to avoid medications that will make him lethargic.

## 2022-08-22 NOTE — CARE UPDATE
[facility] RESPON[facility] NURSE PROACTIVE ROUNDING NOTE       Time of Visit:     Admit Date: 2022  LOS: 56  Code Status: Full Code   Date of Visit: 2022  : 1965  Age: 56 y.o.  Sex: male  Race: Black or   Bed: 311/311 A:   MRN: [id number]  Was the patient discharged from an ICU this admission? Yes   Was the patient discharged from a PACU within last 24 hours? No   Did the patient receive conscious sedation/general anesthesia in last 24 hours? No  Was the patient in the ED within the past 24 hours? No  Was the patient on NIPPV within the past 24 hours? No   Attending Physician: Amy Rhodes MD  Primary Service: [facility]ART TRANSPLANT TEAM 1   Time spent at the bedside: 15 -30 min    SITUATION    Notified by previous RRN during handoff.  Reason for alert: Dizziness, intermittent subjective sob.  Called to evaluate the patient for Respiratory    BACKGROUND     Why is the patient in the hospital?: Left ventricular assist device (LVAD) complication    Patient has a past medical history of Anticoagulant long-term use, CHF (congestive heart failure), Class 1 obesity due to excess calories with serious comorbidity and body mass index (BMI) of 31.0 to 31.9 in adult, Dilated cardiomyopathy, Disorder of kidney and ureter, Encounter for blood transfusion, Gout, HTN (hypertension), psychiatric care, ICD (implantable cardioverter-defibrillator) infection, Psychiatric problem, Thyroid disease, and Ventricular tachycardia (paroxysmal).    Last Vitals:  Temp: 97.3 °F (36.3 °C) ( 2305)  Pulse: 64 ( 0202)  Resp: 22 ( 0100)  BP: 84/0 ( 2305)  SpO2: 99 % ( 0100)    24 Hours Vitals Range:  Temp:  [97.3 °F (36.3 °C)-99.3 °F (37.4 °C)]   Pulse:  [62-88]   Resp:  [18-35]   BP: (72-84)/(0)   SpO2:  [88 %-99 %]     Labs:  Recent Labs     22  0430 22  0444 22  2321   WBC 12.72* 12.62 15.58*   HGB 7.8* 7.7* 7.9*   HCT 27.5* 27.4* 27.4*   * 136* 153       Recent Labs      08/19/22  1027 08/19/22  1214 08/20/22  0430 08/20/22  1514 08/21/22  0444     --  136  --  135*   K 4.9  --  5.0  --  4.7     --  106  --  105   CO2 23  --  24  --  24   CREATININE 1.2  --  1.1  --  1.0   *  --  68*  --  67*   PHOS  --    < > 2.3* 2.7 2.8   MG  --    < > 2.4 2.2 2.2    < > = values in this interval not displayed.        No results for input(s): PH, PCO2, PO2, HCO3, POCSATURATED, BE in the last 72 hours.     ASSESSMENT    Physical Exam  HENT:      Mouth/Throat:      Mouth: Mucous membranes are moist.      Pharynx: Oropharynx is clear.   Cardiovascular:      Rate and Rhythm: Normal rate and regular rhythm.   Pulmonary:      Comments: Subjective sob  Musculoskeletal:         General: Normal range of motion.   Skin:     General: Skin is dry.      Capillary Refill: Capillary refill takes less than 2 seconds.      Comments: Extremities cool-to-palpation  Unable to obtain spO2 reading from extremities   Neurological:      General: No focal deficit present.      Mental Status: He is alert and oriented to person, place, and time. Mental status is at baseline.       INTERVENTIONS    Upon initial assessment, pt. Sitting up in hospital bed with primary RN and RTs at . Pt. Awake, alert, oriented x4, GCS 15, calm, cooperative. Pt. States intermittent subjective sob and current dizziness that has been ongoing most of the day. HCT completed during dayshift. Pt. Denies cp, n/v/d, headache. No VAD alarms noted.    Airway: patent, non obstructed, able to maintain secretions, trach present.  Breathing: non labored, speaking full sentences, equal chest rise and fall, negative for cough.  Circulation: without gross hemorrhage or bleeding noted externally, skin warm and dry to palpation.    Vital Signs:  HR: 65  BP: 80/0 doppler  SpO2: 92% 11L/40% trach collar  RR: 22  Afebrile    HTS MD made aware of patient assessment by primary RN. O2 uptitrated by RT to 12L/60% trach collar. SpO2 probe relocated  to left ear with waveform of 92-95% on cardiac monitor. Plan to monitor hemodynamic, respiratory, and neuro status closely. Spoke with primary RNStephania, and updated on POC. Please call RRN with questions/concerns/deterioration of patient.     PROVIDER ESCALATION    Yes/No  yes by primary RN.    Orders received and case discussed with NA.    Disposition: Remain in room 311.    FOLLOW-UP    Bedside RNStephania updated on plan of care. Instructed to call the Rapid Response Nurse, Cassy Rivera RN at [phone] for additional questions or concerns.

## 2022-08-22 NOTE — NURSING
BG 66, treated per order. Improved to 115.  Notified provider pt requesting nausea medication. meds on hold while pt is nauseated.

## 2022-08-23 PROBLEM — K59.00 CONSTIPATION: Status: ACTIVE | Noted: 2022-08-23

## 2022-08-23 PROBLEM — R74.02 ELEVATED SERUM LACTATE DEHYDROGENASE (LDH): Status: RESOLVED | Noted: 2020-04-23 | Resolved: 2022-08-23

## 2022-08-23 PROBLEM — I10 ESSENTIAL HYPERTENSION: Status: RESOLVED | Noted: 2020-04-24 | Resolved: 2022-08-23

## 2022-08-23 PROBLEM — E87.1 HYPONATREMIA: Status: ACTIVE | Noted: 2022-08-23

## 2022-08-23 PROBLEM — Z95.811 LVAD (LEFT VENTRICULAR ASSIST DEVICE) PRESENT: Status: RESOLVED | Noted: 2018-03-08 | Resolved: 2022-08-23

## 2022-08-23 PROBLEM — R42 DIZZINESS: Status: ACTIVE | Noted: 2022-08-23

## 2022-08-23 LAB
ALLENS TEST: ABNORMAL
AMYLASE SERPL-CCNC: 140 U/L (ref 20–110)
ANION GAP SERPL CALC-SCNC: 4 MMOL/L (ref 8–16)
APTT BLDCRRT: 43 SEC (ref 21–32)
ASCENDING AORTA: 3.17 CM
BASOPHILS # BLD AUTO: 0 K/UL (ref 0–0.2)
BASOPHILS NFR BLD: 0 % (ref 0–1.9)
BSA FOR ECHO PROCEDURE: 2.32 M2
BUN SERPL-MCNC: 22 MG/DL (ref 6–20)
CALCIUM SERPL-MCNC: 8.7 MG/DL (ref 8.7–10.5)
CHLORIDE SERPL-SCNC: 99 MMOL/L (ref 95–110)
CO2 SERPL-SCNC: 26 MMOL/L (ref 23–29)
CREAT SERPL-MCNC: 1.2 MG/DL (ref 0.5–1.4)
CV ECHO LV RWT: 0.27 CM
DELSYS: ABNORMAL
DIFFERENTIAL METHOD: ABNORMAL
DOP CALC LVOT AREA: 4.7 CM2
DOP CALC LVOT DIAMETER: 2.45 CM
E WAVE DECELERATION TIME: 212.22 MSEC
E/A RATIO: 0.85
E/E' RATIO: 10 M/S
ECHO LV POSTERIOR WALL: 1.03 CM (ref 0.6–1.1)
EJECTION FRACTION: 15 %
EOSINOPHIL # BLD AUTO: 0 K/UL (ref 0–0.5)
EOSINOPHIL NFR BLD: 0.3 % (ref 0–8)
ERYTHROCYTE [DISTWIDTH] IN BLOOD BY AUTOMATED COUNT: 18.5 % (ref 11.5–14.5)
EST. GFR  (NO RACE VARIABLE): >60 ML/MIN/1.73 M^2
FIO2: 40
FLOW: 8
FRACTIONAL SHORTENING: 5 % (ref 28–44)
GLUCOSE SERPL-MCNC: 66 MG/DL (ref 70–110)
HCO3 UR-SCNC: 27.2 MMOL/L (ref 24–28)
HCT VFR BLD AUTO: 25.4 % (ref 40–54)
HGB BLD-MCNC: 7.5 G/DL (ref 14–18)
IMM GRANULOCYTES # BLD AUTO: 0.07 K/UL (ref 0–0.04)
IMM GRANULOCYTES NFR BLD AUTO: 0.7 % (ref 0–0.5)
INR PPP: 1.1 (ref 0.8–1.2)
INTERVENTRICULAR SEPTUM: 0.94 CM (ref 0.6–1.1)
LA MAJOR: 6.79 CM
LA MINOR: 6.33 CM
LA WIDTH: 4.11 CM
LDH SERPL L TO P-CCNC: 260 U/L (ref 110–260)
LEFT ATRIUM SIZE: 5.02 CM
LEFT ATRIUM VOLUME INDEX MOD: 38.3 ML/M2
LEFT ATRIUM VOLUME INDEX: 52.7 ML/M2
LEFT ATRIUM VOLUME MOD: 83.56 CM3
LEFT ATRIUM VOLUME: 114.9 CM3
LEFT INTERNAL DIMENSION IN SYSTOLE: 7.14 CM (ref 2.1–4)
LEFT VENTRICLE DIASTOLIC VOLUME INDEX: 138.74 ML/M2
LEFT VENTRICLE DIASTOLIC VOLUME: 302.46 ML
LEFT VENTRICLE MASS INDEX: 165 G/M2
LEFT VENTRICLE SYSTOLIC VOLUME INDEX: 122.4 ML/M2
LEFT VENTRICLE SYSTOLIC VOLUME: 266.8 ML
LEFT VENTRICULAR INTERNAL DIMENSION IN DIASTOLE: 7.55 CM (ref 3.5–6)
LEFT VENTRICULAR MASS: 360.38 G
LIPASE SERPL-CCNC: 85 U/L (ref 4–60)
LV LATERAL E/E' RATIO: 12 M/S
LV SEPTAL E/E' RATIO: 8.57 M/S
LYMPHOCYTES # BLD AUTO: 0.6 K/UL (ref 1–4.8)
LYMPHOCYTES NFR BLD: 5.7 % (ref 18–48)
MAGNESIUM SERPL-MCNC: 2.2 MG/DL (ref 1.6–2.6)
MCH RBC QN AUTO: 28.2 PG (ref 27–31)
MCHC RBC AUTO-ENTMCNC: 29.5 G/DL (ref 32–36)
MCV RBC AUTO: 96 FL (ref 82–98)
MODE: ABNORMAL
MONOCYTES # BLD AUTO: 0.6 K/UL (ref 0.3–1)
MONOCYTES NFR BLD: 6.2 % (ref 4–15)
MV PEAK A VEL: 0.71 M/S
MV PEAK E VEL: 0.6 M/S
MV STENOSIS PRESSURE HALF TIME: 61.55 MS
MV VALVE AREA P 1/2 METHOD: 3.57 CM2
NEUTROPHILS # BLD AUTO: 8.5 K/UL (ref 1.8–7.7)
NEUTROPHILS NFR BLD: 87.1 % (ref 38–73)
NRBC BLD-RTO: 0 /100 WBC
PCO2 BLDA: 47.2 MMHG (ref 35–45)
PH SMN: 7.37 [PH] (ref 7.35–7.45)
PHOSPHATE SERPL-MCNC: 3.3 MG/DL (ref 2.7–4.5)
PISA TR MAX VEL: 3 M/S
PLATELET # BLD AUTO: 135 K/UL (ref 150–450)
PMV BLD AUTO: 12 FL (ref 9.2–12.9)
PO2 BLDA: 62 MMHG (ref 80–100)
POC BE: 2 MMOL/L
POC SATURATED O2: 90 % (ref 95–100)
POC TCO2: 29 MMOL/L (ref 23–27)
POCT GLUCOSE: 103 MG/DL (ref 70–110)
POCT GLUCOSE: 85 MG/DL (ref 70–110)
POCT GLUCOSE: 89 MG/DL (ref 70–110)
POTASSIUM SERPL-SCNC: 4.8 MMOL/L (ref 3.5–5.1)
PROTHROMBIN TIME: 11 SEC (ref 9–12.5)
RA MAJOR: 5.78 CM
RA WIDTH: 3.58 CM
RBC # BLD AUTO: 2.66 M/UL (ref 4.6–6.2)
RIGHT VENTRICULAR END-DIASTOLIC DIMENSION: 5.12 CM
RV TISSUE DOPPLER FREE WALL SYSTOLIC VELOCITY 1 (APICAL 4 CHAMBER VIEW): 5.82 CM/S
SAMPLE: ABNORMAL
SINUS: 3.28 CM
SITE: ABNORMAL
SODIUM SERPL-SCNC: 129 MMOL/L (ref 136–145)
STJ: 2.77 CM
TDI LATERAL: 0.05 M/S
TDI SEPTAL: 0.07 M/S
TDI: 0.06 M/S
TR MAX PG: 36 MMHG
TRICUSPID ANNULAR PLANE SYSTOLIC EXCURSION: 1.26 CM
WBC # BLD AUTO: 9.77 K/UL (ref 3.9–12.7)

## 2022-08-23 PROCEDURE — 20600001 HC STEP DOWN PRIVATE ROOM

## 2022-08-23 PROCEDURE — 85730 THROMBOPLASTIN TIME PARTIAL: CPT | Performed by: INTERNAL MEDICINE

## 2022-08-23 PROCEDURE — 27000221 HC OXYGEN, UP TO 24 HOURS

## 2022-08-23 PROCEDURE — 25000003 PHARM REV CODE 250

## 2022-08-23 PROCEDURE — 36600 WITHDRAWAL OF ARTERIAL BLOOD: CPT

## 2022-08-23 PROCEDURE — 93750 INTERROGATION VAD IN PERSON: CPT | Mod: ,,, | Performed by: INTERNAL MEDICINE

## 2022-08-23 PROCEDURE — 97535 SELF CARE MNGMENT TRAINING: CPT

## 2022-08-23 PROCEDURE — 82800 BLOOD PH: CPT

## 2022-08-23 PROCEDURE — 97530 THERAPEUTIC ACTIVITIES: CPT

## 2022-08-23 PROCEDURE — 99233 SBSQ HOSP IP/OBS HIGH 50: CPT | Mod: 25,,, | Performed by: INTERNAL MEDICINE

## 2022-08-23 PROCEDURE — 25000003 PHARM REV CODE 250: Performed by: INTERNAL MEDICINE

## 2022-08-23 PROCEDURE — 99233 PR SUBSEQUENT HOSPITAL CARE,LEVL III: ICD-10-PCS | Mod: 25,,, | Performed by: INTERNAL MEDICINE

## 2022-08-23 PROCEDURE — 83615 LACTATE (LD) (LDH) ENZYME: CPT | Performed by: INTERNAL MEDICINE

## 2022-08-23 PROCEDURE — 25000003 PHARM REV CODE 250: Performed by: STUDENT IN AN ORGANIZED HEALTH CARE EDUCATION/TRAINING PROGRAM

## 2022-08-23 PROCEDURE — 99900026 HC AIRWAY MAINTENANCE (STAT)

## 2022-08-23 PROCEDURE — 85025 COMPLETE CBC W/AUTO DIFF WBC: CPT | Performed by: STUDENT IN AN ORGANIZED HEALTH CARE EDUCATION/TRAINING PROGRAM

## 2022-08-23 PROCEDURE — 93750 PR INTERROGATE VENT ASSIST DEV, IN PERSON, W PHYSICIAN ANALYSIS: ICD-10-PCS | Mod: ,,, | Performed by: INTERNAL MEDICINE

## 2022-08-23 PROCEDURE — 83690 ASSAY OF LIPASE: CPT | Performed by: STUDENT IN AN ORGANIZED HEALTH CARE EDUCATION/TRAINING PROGRAM

## 2022-08-23 PROCEDURE — 25500020 PHARM REV CODE 255: Performed by: INTERNAL MEDICINE

## 2022-08-23 PROCEDURE — 99232 SBSQ HOSP IP/OBS MODERATE 35: CPT | Mod: ,,, | Performed by: INTERNAL MEDICINE

## 2022-08-23 PROCEDURE — 82803 BLOOD GASES ANY COMBINATION: CPT

## 2022-08-23 PROCEDURE — 27100171 HC OXYGEN HIGH FLOW UP TO 24 HOURS

## 2022-08-23 PROCEDURE — 25000242 PHARM REV CODE 250 ALT 637 W/ HCPCS

## 2022-08-23 PROCEDURE — 83735 ASSAY OF MAGNESIUM: CPT | Performed by: STUDENT IN AN ORGANIZED HEALTH CARE EDUCATION/TRAINING PROGRAM

## 2022-08-23 PROCEDURE — 63600175 PHARM REV CODE 636 W HCPCS: Performed by: STUDENT IN AN ORGANIZED HEALTH CARE EDUCATION/TRAINING PROGRAM

## 2022-08-23 PROCEDURE — 99900035 HC TECH TIME PER 15 MIN (STAT)

## 2022-08-23 PROCEDURE — 94761 N-INVAS EAR/PLS OXIMETRY MLT: CPT

## 2022-08-23 PROCEDURE — 80048 BASIC METABOLIC PNL TOTAL CA: CPT | Performed by: INTERNAL MEDICINE

## 2022-08-23 PROCEDURE — 99232 PR SUBSEQUENT HOSPITAL CARE,LEVL II: ICD-10-PCS | Mod: ,,, | Performed by: INTERNAL MEDICINE

## 2022-08-23 PROCEDURE — 82150 ASSAY OF AMYLASE: CPT | Performed by: STUDENT IN AN ORGANIZED HEALTH CARE EDUCATION/TRAINING PROGRAM

## 2022-08-23 PROCEDURE — 84100 ASSAY OF PHOSPHORUS: CPT | Performed by: STUDENT IN AN ORGANIZED HEALTH CARE EDUCATION/TRAINING PROGRAM

## 2022-08-23 PROCEDURE — 85610 PROTHROMBIN TIME: CPT | Performed by: STUDENT IN AN ORGANIZED HEALTH CARE EDUCATION/TRAINING PROGRAM

## 2022-08-23 PROCEDURE — A9698 NON-RAD CONTRAST MATERIALNOC: HCPCS | Performed by: INTERNAL MEDICINE

## 2022-08-23 PROCEDURE — 94640 AIRWAY INHALATION TREATMENT: CPT

## 2022-08-23 PROCEDURE — 92611 MOTION FLUOROSCOPY/SWALLOW: CPT

## 2022-08-23 PROCEDURE — 27000248 HC VAD-ADDITIONAL DAY

## 2022-08-23 RX ORDER — ALPRAZOLAM 0.5 MG/1
1 TABLET ORAL DAILY PRN
Status: DISCONTINUED | OUTPATIENT
Start: 2022-08-23 | End: 2022-09-01 | Stop reason: HOSPADM

## 2022-08-23 RX ORDER — PANTOPRAZOLE SODIUM 40 MG/1
40 TABLET, DELAYED RELEASE ORAL DAILY
Status: DISCONTINUED | OUTPATIENT
Start: 2022-08-23 | End: 2022-09-01 | Stop reason: HOSPADM

## 2022-08-23 RX ORDER — LACTULOSE 10 G/15ML
20 SOLUTION ORAL ONCE
Status: COMPLETED | OUTPATIENT
Start: 2022-08-23 | End: 2022-08-23

## 2022-08-23 RX ORDER — TALC
6 POWDER (GRAM) TOPICAL NIGHTLY PRN
Status: DISCONTINUED | OUTPATIENT
Start: 2022-08-23 | End: 2022-09-01 | Stop reason: HOSPADM

## 2022-08-23 RX ORDER — GUAIFENESIN 100 MG/5ML
300 SOLUTION ORAL EVERY 6 HOURS PRN
Status: DISCONTINUED | OUTPATIENT
Start: 2022-08-23 | End: 2022-09-01 | Stop reason: HOSPADM

## 2022-08-23 RX ORDER — SYRING-NEEDL,DISP,INSUL,0.3 ML 29 G X1/2"
296 SYRINGE, EMPTY DISPOSABLE MISCELLANEOUS ONCE
Status: DISCONTINUED | OUTPATIENT
Start: 2022-08-23 | End: 2022-08-23

## 2022-08-23 RX ORDER — ACETAMINOPHEN 650 MG/20.3ML
1000 LIQUID ORAL EVERY 8 HOURS PRN
Status: DISCONTINUED | OUTPATIENT
Start: 2022-08-23 | End: 2022-09-01 | Stop reason: HOSPADM

## 2022-08-23 RX ORDER — AMIODARONE HYDROCHLORIDE 200 MG/1
200 TABLET ORAL DAILY
Status: DISCONTINUED | OUTPATIENT
Start: 2022-08-24 | End: 2022-08-28

## 2022-08-23 RX ADMIN — PREDNISONE 20 MG: 20 TABLET ORAL at 11:08

## 2022-08-23 RX ADMIN — PANTOPRAZOLE SODIUM 40 MG: 40 TABLET, DELAYED RELEASE ORAL at 02:08

## 2022-08-23 RX ADMIN — LEVALBUTEROL HYDROCHLORIDE 0.63 MG: 0.63 SOLUTION RESPIRATORY (INHALATION) at 01:08

## 2022-08-23 RX ADMIN — LEVALBUTEROL HYDROCHLORIDE 0.63 MG: 0.63 SOLUTION RESPIRATORY (INHALATION) at 07:08

## 2022-08-23 RX ADMIN — BARIUM SULFATE 90 ML: 0.81 POWDER, FOR SUSPENSION ORAL at 09:08

## 2022-08-23 RX ADMIN — LEVALBUTEROL HYDROCHLORIDE 0.63 MG: 0.63 SOLUTION RESPIRATORY (INHALATION) at 02:08

## 2022-08-23 RX ADMIN — LACTULOSE 20 G: 20 SOLUTION ORAL at 02:08

## 2022-08-23 RX ADMIN — Medication 400 MG: at 09:08

## 2022-08-23 RX ADMIN — HEPARIN SODIUM 19 UNITS/KG/HR: 5000 INJECTION INTRAVENOUS; SUBCUTANEOUS at 11:08

## 2022-08-23 RX ADMIN — MIRTAZAPINE 15 MG: 15 TABLET, FILM COATED ORAL at 09:08

## 2022-08-23 RX ADMIN — Medication 400 MG: at 11:08

## 2022-08-23 RX ADMIN — AMIODARONE HYDROCHLORIDE 400 MG: 200 TABLET ORAL at 11:08

## 2022-08-23 RX ADMIN — MECLIZINE HYDROCHLORIDE 25 MG: 25 TABLET ORAL at 04:08

## 2022-08-23 RX ADMIN — MEXILETINE HYDROCHLORIDE 400 MG: 200 CAPSULE ORAL at 09:08

## 2022-08-23 RX ADMIN — ASPIRIN 81 MG CHEWABLE TABLET 81 MG: 81 TABLET CHEWABLE at 11:08

## 2022-08-23 RX ADMIN — MECLIZINE HYDROCHLORIDE 25 MG: 25 TABLET ORAL at 08:08

## 2022-08-23 RX ADMIN — BISACODYL 10 MG: 10 SUPPOSITORY RECTAL at 06:08

## 2022-08-23 RX ADMIN — POLYETHYLENE GLYCOL 3350 17 G: 17 POWDER, FOR SOLUTION ORAL at 02:08

## 2022-08-23 RX ADMIN — WARFARIN SODIUM 6 MG: 3 TABLET ORAL at 04:08

## 2022-08-23 RX ADMIN — MEXILETINE HYDROCHLORIDE 400 MG: 200 CAPSULE ORAL at 06:08

## 2022-08-23 RX ADMIN — MEXILETINE HYDROCHLORIDE 400 MG: 200 CAPSULE ORAL at 02:08

## 2022-08-23 NOTE — ASSESSMENT & PLAN NOTE
-CT abdomen and pelvis show large stool burden and gas  -Urged patient to continue aggressive bowel regimen  -Currently on lactulose, miralax, and senna docusate but patient has been refusing past 3 days per documentation  -MBS within normal limits  -will advance diet as tolerated

## 2022-08-23 NOTE — PROCEDURES
Modified Barium Swallow    Patient Name:  Tim Richards   MRN:  [id number]      Recommendations:     Recommendations:                General Recommendations:  Dysphagia therapy  Diet recommendations:  Regular, Thin   Aspiration Precautions: speaking valve in place for all meals , intermittent double swallows with solids to clear any residue , 1 bite/sip at a time, Alternating bites/sips, Eliminate distractions, Feed only when awake/alert and HOB to 90 degrees   General Precautions: Standard, fall, LVAD, sternal  Communication strategies:  One way speaking valve    Referral     Reason for Referral  Patient was referred for a Modified Barium Swallow Study to assess the efficiency of his/her swallow function, rule out aspiration and make recommendations regarding safe dietary consistencies, effective compensatory strategies, and safe eating environment.     Diagnosis: Left ventricular assist device (LVAD) complication       History:     Past Medical History:   Diagnosis Date    Anticoagulant long-term use     CHF (congestive heart failure)     Class 1 obesity due to excess calories with serious comorbidity and body mass index (BMI) of 31.0 to 31.9 in adult     Dilated cardiomyopathy 1/10/2018    Disorder of kidney and ureter     CKD    Encounter for blood transfusion     Gout     HTN (hypertension)     Hx of psychiatric care     ICD (implantable cardioverter-defibrillator) infection 7/1/2020    Psychiatric problem     Thyroid disease     Ventricular tachycardia (paroxysmal)        Objective:     Current Respiratory Status: 08/23/22    Alert: yes    Cooperative: yes    Follows Directions: yes    Visualization  · Patient was seen in the lateral view  · Tracheostomy tube visualized in place/ One Way Speaking Valve present    Oral Peripheral Examination  · Oral Musculature: WFL  · Dentition: present and adequate  · Oral Labial Strength and Mobility: WFL  · Lingual Strength and Mobility: WFL  · Volitional  Cough: strong  · Volitional Swallow: prompt  · Voice Prior to PO Intake: strong and clear with valve in place    Consistencies Assessed  · Thin 90mL via open cup   · Solids x3    Oral Preparation/Oral Phase  · WFL- Pt with adequate bolus acceptance, containment, control and timely A-P transfer across consistencies    · Pt with large cyclic swallows with thin liquids      Pharyngeal Phase   Pt essentially timely swallow initiation for age  Pt with very mildly reduced BOT retraction  Pt with adequate hyolaryngeal elevation and excursion patterns  Pt with complete epiglottic inversion patterns   Pt with flash penetration during cyclic swallows of thin liquids reaching the vocal folds x1, material fully redirected out of the laryngeal vestibule and pt with appropriate throat clear sensory response  With restriction to smaller single sips at a time of thin liquids no additional episodes of penetration appreciated   Pt without Aspiration before/during/after the swallow with thin liquids and regular solid consistencies   There was presence of mild valleculae residue post solid which essentially fully cleared with a thin liquid suyapa and a second swallow      Cervical Esophageal Phase  · UES appeared to accommodate all bolus types without stasis or retrograde movement observed     Assessment:     Impressions  Patient presents with significantly improved pharyngeal swallow function compared to initial study completed 8/12/22. Since that time NG tube has been removed, pt has tolerated a full nectar thick liquid diet and pt as been compliant with dysphagia exercises. Pt now presents with intact sensation and greatly improved hyolaryngeal rang of motion and pharyngeal strength to manage thin liquids and regular solids. Pt best benefits from wearing speaking valve during PO intake and restricting to single sips at a time. With solids, mild stasis remains but is essentially cleared with a liquid wash.     Prognosis:  Good    Barriers:  · None    Plan  Speech will continue to monitor diet tolerance with upgrade to regular solids and thin liquids     Education  Results were discussed with patient. Results were discussed with Medical Team who was in agreement with plan.  SLP discussed ongoing strategies and modification with both RN and patient. Pt able to verbally teachback strategies to SLP and dmeonstrated understanding and agreement with plan. Speech will continue to follow to ensure diet tolerance with new upgrade as well as ongoingtrach/speaking valve management.     Goals:   Multidisciplinary Problems     SLP Goals        Problem: SLP    Goal Priority Disciplines Outcome   SLP Goal     SLP Ongoing, Progressing   Description: Speech Language Pathology Goals  Goals expected to be met by 8/18    1.Pt will participate in more objective swallow assessment via MBSS to help determine least restrictive diet   2. . Pt will tolerate PMSV for waking hours to increase phonation, respiration and swallowing aspects  3. Pt/family will don/doff PMSV x1 during session with min cues      Speech Language Pathology Goals  Goals expected to be met by 8/25/22    1. Pt will tolerate full nectar thick liquid diet without overt clinical signs of aspiration   2. Pt will participate in repeat MBS to help determine least restrictive diet   3. Pt will continue to tolerate PMSV for all waking hours                            Plan:   · Patient to be seen:  Therapy Frequency: 2 x/week   · Plan of Care expires:     · Plan of Care reviewed with:  patient        Discharge recommendations:  rehabilitation facility   Barriers to Discharge:  None per ST     Time Tracking:   SLP Treatment Date:   08/23/22  Speech Start Time:  0940  Speech Stop Time:  1003     Speech Total Time (min):  23 min    08/23/2022

## 2022-08-23 NOTE — NURSING
Pt sitting up in bed, no family at bedside. Pt happy that he passed his barium swallow. Pt is asking questions about rehab and trach removal. Questions answered to patient's satisfaction by verbal acknowledgement. Will continue to follow SLP regarding trach. Holding IPR consult for now until patient progresses more with inpatient PT/OT.     Pt asking if he can have fruit, will review with EMILY Israel on how he is doing with solid foods. Wife went to get him a smoothie.

## 2022-08-23 NOTE — PROGRESS NOTES
08/23/2022  Casper Harris    Current provider:  Roslyn Ragsdale DO    Device interrogation:  TXP LVAD INTERROGATIONS 8/23/2022 8/22/2022 8/22/2022 8/22/2022 8/22/2022 8/22/2022 8/22/2022   Type HeartMate3 HeartMate3 HeartMate3 HeartMate3 HeartMate3 HeartMate3 HeartMate3   Flow 5.2 5.5 5.4 5.0 5.2 5.5 5.6   Speed 6300 6300 6300 6300 6300 6300 6300   PI 3.6 2.2 2.7 2.9 3.2 3.0 1.8   Power (Jackson) 5.2 5.4 5.3 5.1 5.0 5.1 5.4   LSL 5900 5900 5900 - - 5900 5900   Low Flow Alarm - - - - - - -   Pulsatility No Pulse No Pulse No Pulse - - - -          Rounded on Tim Richards to ensure all mechanical assist device settings (IABP or VAD) were appropriate and all parameters were within limits.  I was able to ensure all back up equipment was present, the staff had no issues, and the Perfusion Department daily rounding was complete.      For implantable VADs: Interrogation of Ventricular assist device was performed with analysis of device parameters and review of device function. I have personally reviewed the interrogation findings and agree with findings as stated.     In emergency, the nursing units have been notified to contact the perfusion department either by:  Calling [phone] from 630am to 4pm Mon thru Fri, utilizing the On-Call Finder functionality of Epic and searching for Perfusion, or by contacting the hospital  from 4pm to 630am and on weekends and asking to speak with the perfusionist on call.    7:20 AM

## 2022-08-23 NOTE — ASSESSMENT & PLAN NOTE
Patient endorses positional dizziness  D/Gaston buspar and decreased amiodarone to 200mg daily at this time  Minimally responsive to meclizine, will continue to monitor

## 2022-08-23 NOTE — PROGRESS NOTES
Attempted to change wound vac dressing today but unable due to patient having other procedures. Will attempt to change tomorrow. Supplies ordered to  bedside.

## 2022-08-23 NOTE — PLAN OF CARE
"Plan of care discussed with patient.  Fall precautions in place. LVAD DP and numbers WNL, smooth LVAD hum. Patient has no complaints of pain. Dizziness improved after antivert. Heparin gtt maintained per nomogram.Discussed medications and care.  Patient has no questions at this time. Will continue to monitor.       LVAD dressing change completed using sterile technique with daily lvad kit. DLES is a "2" with moderate serous  drainage noted on the drain sponge. Tolerated without any complication. Sutures remain intact,some redness noted, no tenderness.    MSI incision and old VAD site R Abdomen- changed per order.  "

## 2022-08-23 NOTE — PROGRESS NOTES
08/23/2022  Carissa Dean    Current provider:  Roslyn Ragsdale DO    Device interrogation:  TXP LVAD INTERROGATIONS 8/23/2022 8/23/2022 8/22/2022 8/22/2022 8/22/2022 8/22/2022 8/22/2022   Type HeartMate3 HeartMate3 HeartMate3 HeartMate3 HeartMate3 HeartMate3 HeartMate3   Flow 5.3 5.2 5.5 5.4 5.0 5.2 5.5   Speed 6300 6300 6300 6300 6300 6300 6300   PI 3.4 3.6 2.2 2.7 2.9 3.2 3.0   Power (Jackson) 5.4 5.2 5.4 5.3 5.1 5.0 5.1   LSL 5900 5900 5900 5900 - - 5900   Low Flow Alarm - - - - - - -   Pulsatility - No Pulse No Pulse No Pulse - - -          Rounded on Tim Richards to ensure all mechanical assist device settings (IABP or VAD) were appropriate and all parameters were within limits.  I was able to ensure all back up equipment was present, the staff had no issues, and the Perfusion Department daily rounding was complete.      For implantable VADs: Interrogation of Ventricular assist device was performed with analysis of device parameters and review of device function. I have personally reviewed the interrogation findings and agree with findings as stated.     In emergency, the nursing units have been notified to contact the perfusion department either by:  Calling [phone] from 630am to 4pm Mon thru Fri, utilizing the On-Call Finder functionality of Epic and searching for Perfusion, or by contacting the hospital  from 4pm to 630am and on weekends and asking to speak with the perfusionist on call.    11:19 AM

## 2022-08-23 NOTE — PROGRESS NOTES
[facility] - Cardiology Stepdown  Heart Transplant  Progress Note    Patient Name: Tim Richards  MRN: [id number]  Admission Date: 6/27/2022  Hospital Length of Stay: 57 days  Attending Physician: Roslyn Ragsdale DO  Primary Care Provider: Deyanira Booth MD  Principal Problem:Left ventricular assist device (LVAD) complication    Subjective:     Interval History: Patient reported episodic headaches overnight that have associated nausea.  Reports minimal response to meclizine.  Has remained NPO with plan for swallow study this morning.  Does have some nausea but is endorsing an appetite.  Has had a small caliber, small volume, well formed bowel movement this morning as well as some gas.     Hemodynamics:  HM3  Flow rate: 5.1, 6350 rpm, PI 3.9, P 5.3  Upon interrogation no power spikes or suction events    GTT:  -none    Continuous Infusions:   dextrose 10 % in water (D10W)      heparin (porcine) in D5W 19 Units/kg/hr (08/23/22 1111)     Scheduled Meds:   amiodarone  400 mg Oral Daily    aspirin  81 mg Oral Daily    levalbuterol  0.63 mg Nebulization Q6H    magnesium oxide  400 mg Oral BID    mexiletine  400 mg Oral Q8H    mirtazapine  15 mg Oral QHS    ondansetron  4 mg Intravenous Once    pantoprazole  40 mg Oral Daily    polyethylene glycol  17 g Oral Daily    predniSONE  20 mg Oral Daily    senna-docusate 8.6-50 mg  1 tablet Oral Daily    warfarin  6 mg Oral Daily     PRN Meds:sodium chloride 0.9%, acetaminophen, ALPRAZolam, barium sulfate, barium sulfate, bisacodyL, dextrose 10 % in water (D10W), dextrose 10%, dextrose 10%, glucagon (human recombinant), glucose, guaiFENesin 100 mg/5 ml, HYDROmorphone, HYDROmorphone, hydrOXYzine HCL, meclizine, melatonin, ondansetron    Review of patient's allergies indicates:   Allergen Reactions    Lisinopril Anaphylaxis    Hydralazine analogues      Chronic constipation, impotence, dizziness     Objective:     Vital Signs (Most Recent):  Temp: 97.8 °F  (36.6 °C) (08/23/22 1112)  Pulse: (!) 122 (08/23/22 1112)  Resp: 14 (08/23/22 1112)  BP: (!) 70/0 (08/23/22 0727)  SpO2: (!) 90 % (08/23/22 1112)   Vital Signs (24h Range):  Temp:  [97.5 °F (36.4 °C)-98.6 °F (37 °C)] 97.8 °F (36.6 °C)  Pulse:  [] 122  Resp:  [14-22] 14  SpO2:  [90 %-98 %] 90 %  BP: (68-80)/(0) 70/0     Patient Vitals for the past 72 hrs (Last 3 readings):   Weight   08/23/22 0400 93.6 kg (206 lb 4.8 oz)   08/21/22 0500 89 kg (196 lb 3.4 oz)     Body mass index is 27.22 kg/m².      Intake/Output Summary (Last 24 hours) at 8/23/2022 1201  Last data filed at 8/23/2022 0600  Gross per 24 hour   Intake 2193.55 ml   Output 1400 ml   Net 793.55 ml       Hemodynamic Parameters:       Telemetry: Sinus rhythm,  (per monitor, but upon review of telemetry no sustained bradycardia), infrequent PVCs    Physical Exam  Constitutional:       General: He is not in acute distress.     Appearance: He is ill-appearing.   HENT:      Head: Normocephalic and atraumatic.      Nose: Nose normal.      Mouth/Throat:      Mouth: Mucous membranes are dry.      Comments: Tracheostomy site is clean and dry without drainage  Eyes:      Extraocular Movements: Extraocular movements intact.      Conjunctiva/sclera: Conjunctivae normal.      Pupils: Pupils are equal, round, and reactive to light.   Cardiovascular:      Pulses: Normal pulses.      Heart sounds: Normal heart sounds.      Comments: VAD hum can be heard  Pulmonary:      Effort: Pulmonary effort is normal.      Breath sounds: Normal breath sounds.   Abdominal:      General: Abdomen is flat. There is no distension.      Palpations: Abdomen is soft.      Tenderness: There is no abdominal tenderness. There is no guarding.      Comments: Hypoactive bowel sounds   Musculoskeletal:         General: Normal range of motion.   Skin:     General: Skin is warm and dry.   Neurological:      Mental Status: He is alert.       Significant Labs:  CBC:  Recent Labs   Lab  08/21/22 2321 08/22/22  0510 08/23/22  0434   WBC 15.58* 12.27 9.77   RBC 2.89* 2.75* 2.66*   HGB 7.9* 7.8* 7.5*   HCT 27.4* 27.3* 25.4*    134* 135*   MCV 95 99* 96   MCH 27.3 28.4 28.2   MCHC 28.8* 28.6* 29.5*     BNP:  Recent Labs   Lab 08/17/22  0343 08/19/22  0523 08/22/22  0510   * 482*  482* 886*     CMP:  Recent Labs   Lab 08/20/22  0430 08/21/22 0444 08/22/22  0510 08/23/22  0434   GLU 68* 67* 69* 66*   CALCIUM 9.0 8.9 8.9 8.7   ALBUMIN 2.2* 2.1* 2.1*  --    PROT 6.0 5.8* 5.9*  --     135* 130* 129*   K 5.0 4.7 4.7 4.8   CO2 24 24 26 26    105 100 99   BUN 32* 24* 21* 22*   CREATININE 1.1 1.0 1.1 1.2   ALKPHOS 225* 208* 182*  --    ALT 64* 58* 58*  --    AST 43* 37 34  --    BILITOT 1.8* 1.7* 1.6*  --       Coagulation:   Recent Labs   Lab 08/21/22 0444 08/22/22  0510 08/22/22  1304 08/22/22 2103 08/23/22  0434   INR 1.0 1.0  --   --  1.1   APTT 50.6* 56.1* 55.2* 42.6* 43.0*     LDH:  Recent Labs   Lab 08/21/22 0444 08/22/22  0510 08/23/22  0434   * 282* 260     Microbiology:  Microbiology Results (last 7 days)       ** No results found for the last 168 hours. **            I have reviewed all pertinent labs within the past 24 hours.    Estimated Creatinine Clearance: 78.6 mL/min (based on SCr of 1.2 mg/dL).    Diagnostic Results:  I have reviewed all pertinent imaging results/findings within the past 24 hours.    Assessment and Plan:     57 Y/O M with Hx of stage D HFrEF (EF 10%) due to NICM underwent HM2 implant 3/8/18 and exchange of outflow graft 3/9/18, Hx VT/VF s/p SICD 2014 presenting with gradual worsening shortness of breath associated with nonpleuritic, nonradiating bilateral 4/10 retrosternal chest pressure pain.  Symptoms began yesterday and have gradually worsened in the last 12 hours.  He does report going to a family picnic with increased consumption of chicken wings, ribs and other salty meat products.  Prior to symptom onset, he reports nausea,  nonbloody diarrhea began yesterday and single episode of nonbloody nonbilious vomiting this morning. He also complains of dizziness, lightheadedness, and a mild HA. SOB worsened this morning prompting him to come to the ED.     In the ED, patient was in respiratory distress requiring BiPAP. MAP 96 and started on Nitro gtt. Work-up revealed WBC 10, K 5.4, Cr 2.5 (baseline 1.9), BNP 1950 (baseline 200-300s), Bili 2.1, LDH 1058, and INR 3.2. Bedside TTE with severely reduced EF, AV not opening, septum bulging into RV. Given IV Lasix 80 mg x1 with only 100-200 mL UOP and dark in color. HM2 interrogated and flows have been 8.5-9 L/min with no alarms or power surges. Cardiology consulted in the ED, dicussed with HTS, and decision made to admit to ICU for further mgmt.       * Left ventricular assist device (LVAD) complication  The patient presented with COVID and found to have an uptrending LDH with increased power.  He underwent HM III upgrade on 7/13/22  -Daily LDH   -Continue Heparin drip, transitioning to coumadin with INR goal of 2-3, today is 1.1, on dose of 6mg    Procedure: Device Interrogation Including analysis of device parameters  Current Settings: Ventricular Assist Device    TXP LVAD INTERROGATIONS 7/29/2022 7/29/2022 7/29/2022 7/29/2022 7/29/2022 7/29/2022 7/29/2022   Type HeartMate3 HeartMate3 HeartMate3 HeartMate3 HeartMate3 HeartMate3 HeartMate3   Flow 5.2 5.1 5.0 5.0 5.1 5.2 5.1   Speed 6000 6000 6000 6000 6000 6000 6000   PI 3.5 3.4 3.4 3.4 3.4 3.5 2.4   Power (Jackson) 4.8 4.8 4.7 4.7 4.7 4.7 5.5   LSL 5600 5600 5600 5600 5600 5600 6000   Low Flow Alarm - - - - - - -   Pulsatility - Intermittent pulse Intermittent pulse Intermittent pulse Intermittent pulse Intermittent pulse Intermittent pulse       Dizziness  Patient endorses positional dizziness  D/Gaston buspar and decreased amiodarone to 200mg daily at this time  Minimally responsive to meclizine, will continue to monitor    Constipation  -CT abdomen  and pelvis show large stool burden and gas  -Urged patient to continue aggressive bowel regimen  -Currently on lactulose, miralax, and senna docusate but patient has been refusing past 3 days per documentation  -MBS within normal limits  -will advance diet as tolerated    History of ventricular tachycardia  Patient experienced an episode of VT on 6/30 and experienced an ICD shock thought to be related to hypokalemia (K of 3.1 on 6/30)  - Aggressively replete K, keep K>4 and Mg>2  - Continue mexiletine PO, lidocaine gtt, and amiodarone gtt   - EP signed off and recommending Amiodarone and Mexilitine, so would try to wean Lidocaine gtt if able  - Continue to monitor on telemetry    COVID-19 virus infection  -Previously hypoxic then recovered  -HM2 complicated by thrombosis associated with COVID-19 infection  -ID was consulted, recommended Remdesivir, course completed    amiodarone induced hypothyrodism  -Continue levothyroxine 112 mcg     Chronic combined systolic and diastolic heart failure  -Previously on HM2 but complicated by thrombosis related to COVID  -Underwent HM III upgrade on 7/13/22, currently on [facility] ECMO  -Volume status: currently appears slightly volume overloaded but currently not tolerating any PO intake, therefore will not increase or add diuretics  -Chest tube removal, bronch, and old driveline removed 7/22  -Patient continuing to become hyponatremic, 129 today  -Currently being bridged with heparin, continue coumadin, currently on dose of 6mg, INR of 1.1 today    Nic Torres MD  Heart Transplant  [facility] - Cardiology Stepdown

## 2022-08-23 NOTE — PROGRESS NOTES
Interdisciplinary Rounds Report:   Attended interdisciplinary rounds with the [facility]/CTS services including the LVAD Coordinators, social workers, cardiologists, surgeons,  PT/OT/Speech, dietician, and unit charge nurses. Discussed patient status, plan of care, goals of care, including DC date, and post discharge needs. Plan of care will be discussed with the patient and/or family per the physician while rounding on the floor. This is a recurring meeting that is medically and socially necessary to collaborate with the interdisciplinary team to assist patient needs and safe discharge.

## 2022-08-23 NOTE — PLAN OF CARE
"  Problem: Adult Inpatient Plan of Care  Goal: Patient-Specific Goal (Individualized)  Outcome: Ongoing, Progressing  Flowsheets (Taken 8/23/2022 1855)  Anxieties, Fears or Concerns: eating regular foods  Individualized Care Needs: advance diet  Patient-Specific Goals (Include Timeframe): pt advance to regular diet tomorrow with no nausea     Problem: Adjustment to Device (Ventricular Assist Device)  Goal: Optimal Adjustment to Device  Outcome: Ongoing, Progressing  Intervention: Optimize Psychosocial Response to VAD  Flowsheets (Taken 8/23/2022 1855)  Supportive Measures: active listening utilized     Problem: Infection  Goal: Absence of Infection Signs and Symptoms  Intervention: Prevent or Manage Infection  Flowsheets (Taken 8/23/2022 1855)  Infection Management: aseptic technique maintained       Plan of care discussed with patient.  Fall precautions in place. LVAD DP and numbers WNL, smooth LVAD hum. Patient has no complaints of pain. Dizziness wax/wane despite antivert administration. No complaints of nausea. Pt passed barium swallow study. Abdominal CT and echo completed. Tolerating liquid diet and some soft foods. Heparin gtt maintained per nomogram. Discussed medications and care.  Patient has no questions at this time.         LVAD dressing change completed using sterile technique with daily lvad kit. DLES is a "2" with moderate serous  drainage noted on the drain sponge. Tolerated without any complication. Sutures remain intact,some redness noted, no tenderness.     MSI incision and old VAD site R Abdomen- changed per order.  Midline dressing changed utilizing sterile technique.           "

## 2022-08-23 NOTE — PT/OT/SLP PROGRESS
Physical Therapy      Patient Name:  Tim Richards   MRN:  [id number]    Patient not seen today. Pt getting ready to undergo a bedside ECHO. Will follow-up per POC.

## 2022-08-23 NOTE — PROGRESS NOTES
Visited patient at bedside, bedside RN is completing dressing changes at bedside. Patient is doing well no needs at this time. Will continue to round on patient.

## 2022-08-23 NOTE — ASSESSMENT & PLAN NOTE
-Previously on HM2 but complicated by thrombosis related to COVID  -Underwent HM III upgrade on 7/13/22, currently on [facility] ECMO  -Volume status: currently appears slightly volume overloaded but currently not tolerating any PO intake, therefore will not increase or add diuretics  -Chest tube removal, bronch, and old driveline removed 7/22  -Patient continuing to become hyponatremic, 129 today  -Currently being bridged with heparin, continue coumadin, currently on dose of 6mg, INR of 1.1 today

## 2022-08-23 NOTE — PLAN OF CARE
Cardiac Stepdown Care Plan    POC reviewed with Tim Richards. Questions and concerns addressed. No acute events overnight. Pt progressing toward goals. Will continue to monitor. See below and flowsheets for full assessment and VS info.       Neuro:  Deedee Coma Scale  Best Eye Response: 4-->(E4) spontaneous  Best Motor Response: 6-->(M6) obeys commands  Best Verbal Response: 5-->(V5) oriented  Deedee Coma Scale Score: 15  Assessment Qualifiers: patient not sedated/intubated  Pupil PERRLA: yes    24 hr Temp:  [97.5 °F (36.4 °C)-98.6 °F (37 °C)]      CV:  Rhythm: normal sinus rhythm   DVT prophylaxis: VTE Required Core Measure: Pharmacological prophylaxis initiated/maintained    CVP (mean): 3 mmHg (08/09/22 0700)    [REMOVED] Pulmonary Artery Catheter Assessment  07/13/22 0900 left internal jugular-Current Insertion Depth (cm): 66 cm (07/15/22 0300)  PAP: 252/252 (07/15/22 1100)  SVO2 (%): 66 % (08/09/22 0700)  CO (L/min): 6.8 L/min (07/15/22 0600)  CI (L/min/m2): 3 L/min/m2 (07/15/22 0600)  SVR (dyne*sec)/cm5: 714 (dyne*sec)/cm5 (07/15/22 0300)       Type: HeartMate3       Pulses  Right Radial Pulse: Doppler  Left Radial Pulse: Doppler  Right Dorsalis Pedis Pulse: Doppler  Left Dorsalis Pedis Pulse: Doppler  Right Posterior Tibial Pulse: Doppler  Left Posterior Tibial Pulse: Doppler    Resp:  O2 Device (Oxygen Therapy): Trach Collar  Flow (L/min): 8  Vent Mode: Stand-by  Set Rate: 22 BPM  Oxygen Concentration (%): 40  Vt Set: 550 mL  PEEP/CPAP: 5 cmH20  Pressure Support: 10 cmH20    GI/:  GI prophylaxis: yes  Diet/Nutrition Received: full liquid  Last Bowel Movement: 08/21/22      Intake/Output Summary (Last 24 hours) at 8/23/2022 0805  Last data filed at 8/23/2022 0600  Gross per 24 hour   Intake 2193.55 ml   Output 1400 ml   Net 793.55 ml          Labs/Accuchecks:  Recent Labs   Lab 08/21/22  2321 08/22/22  0510 08/23/22  0434   WBC 15.58* 12.27 9.77   RBC 2.89* 2.75* 2.66*   HGB 7.9* 7.8* 7.5*   HCT  27.4* 27.3* 25.4*    134* 135*      Recent Labs   Lab 08/21/22  0444 08/22/22  0510 08/22/22  1304 08/22/22  2103 08/23/22  0434   INR 1.0 1.0  --   --  1.1   APTT 50.6* 56.1* 55.2* 42.6* 43.0*      Recent Labs     08/22/22  0510 08/23/22  0434   * 129*   K 4.7 4.8   CO2 26 26    99   BUN 21* 22*   CREATININE 1.1 1.2   ALKPHOS 182*  --    ALT 58*  --    AST 34  --    BILITOT 1.6*  --      No results for input(s): CPK, CPKMB, MB, TROPONINI in the last 168 hours. No results for input(s): PH, PCO2, PO2, HCO3, POCSATURATED, BE in the last 72 hours.    Electrolytes: N/A - electrolytes WDL  Accuchecks: ACHS    Gtts/LDAs:   dextrose 10 % in water (D10W)      heparin (porcine) in D5W 19 Units/kg/hr (08/23/22 0600)       Lines/Drains/Airways       Airway  Duration                  Surgical Airway 08/04/22 Shiley Cuffed 19 days              Line  Duration                  VAD 07/13/22 1300 Left ventricular assist device HeartMate 3 40 days              Peripheral Intravenous Line  Duration                  Midline Catheter Insertion/Assessment  - Single Lumen 08/16/22 1950 Right brachial vein 20g x 8cm 6 days         Peripheral IV - Single Lumen 08/22/22 1830 20 G;1 3/4 in Anterior;Left Forearm <1 day                    Skin/Wounds  Bathing/Skin Care: bath, partial;incontinence care (08/23/22 0300)  Wounds: Yes  Wound care consulted: Yes

## 2022-08-23 NOTE — SUBJECTIVE & OBJECTIVE
Interval History: Patient reported episodic headaches overnight that have associated nausea.  Reports minimal response to meclizine.  Has remained NPO with plan for swallow study this morning.  Does have some nausea but is endorsing an appetite.  Has had a small caliber, small volume, well formed bowel movement this morning as well as some gas.     Hemodynamics:  HM3  Flow rate: 5.1, 6350 rpm, PI 3.9, P 5.3  Upon interrogation no power spikes or suction events    GTT:  -none    Continuous Infusions:   dextrose 10 % in water (D10W)      heparin (porcine) in D5W 19 Units/kg/hr (08/23/22 1111)     Scheduled Meds:   amiodarone  400 mg Oral Daily    aspirin  81 mg Oral Daily    levalbuterol  0.63 mg Nebulization Q6H    magnesium oxide  400 mg Oral BID    mexiletine  400 mg Oral Q8H    mirtazapine  15 mg Oral QHS    ondansetron  4 mg Intravenous Once    pantoprazole  40 mg Oral Daily    polyethylene glycol  17 g Oral Daily    predniSONE  20 mg Oral Daily    senna-docusate 8.6-50 mg  1 tablet Oral Daily    warfarin  6 mg Oral Daily     PRN Meds:sodium chloride 0.9%, acetaminophen, ALPRAZolam, barium sulfate, barium sulfate, bisacodyL, dextrose 10 % in water (D10W), dextrose 10%, dextrose 10%, glucagon (human recombinant), glucose, guaiFENesin 100 mg/5 ml, HYDROmorphone, HYDROmorphone, hydrOXYzine HCL, meclizine, melatonin, ondansetron    Review of patient's allergies indicates:   Allergen Reactions    Lisinopril Anaphylaxis    Hydralazine analogues      Chronic constipation, impotence, dizziness     Objective:     Vital Signs (Most Recent):  Temp: 97.8 °F (36.6 °C) (08/23/22 1112)  Pulse: (!) 122 (08/23/22 1112)  Resp: 14 (08/23/22 1112)  BP: (!) 70/0 (08/23/22 0727)  SpO2: (!) 90 % (08/23/22 1112)   Vital Signs (24h Range):  Temp:  [97.5 °F (36.4 °C)-98.6 °F (37 °C)] 97.8 °F (36.6 °C)  Pulse:  [] 122  Resp:  [14-22] 14  SpO2:  [90 %-98 %] 90 %  BP: (68-80)/(0) 70/0     Patient Vitals for the past 72  hrs (Last 3 readings):   Weight   08/23/22 0400 93.6 kg (206 lb 4.8 oz)   08/21/22 0500 89 kg (196 lb 3.4 oz)     Body mass index is 27.22 kg/m².      Intake/Output Summary (Last 24 hours) at 8/23/2022 1201  Last data filed at 8/23/2022 0600  Gross per 24 hour   Intake 2193.55 ml   Output 1400 ml   Net 793.55 ml       Hemodynamic Parameters:       Telemetry: Sinus rhythm,  (per monitor, but upon review of telemetry no sustained bradycardia), infrequent PVCs    Physical Exam  Constitutional:       General: He is not in acute distress.     Appearance: He is ill-appearing.   HENT:      Head: Normocephalic and atraumatic.      Nose: Nose normal.      Mouth/Throat:      Mouth: Mucous membranes are dry.      Comments: Tracheostomy site is clean and dry without drainage  Eyes:      Extraocular Movements: Extraocular movements intact.      Conjunctiva/sclera: Conjunctivae normal.      Pupils: Pupils are equal, round, and reactive to light.   Cardiovascular:      Pulses: Normal pulses.      Heart sounds: Normal heart sounds.      Comments: VAD hum can be heard  Pulmonary:      Effort: Pulmonary effort is normal.      Breath sounds: Normal breath sounds.   Abdominal:      General: Abdomen is flat. There is no distension.      Palpations: Abdomen is soft.      Tenderness: There is no abdominal tenderness. There is no guarding.      Comments: Hypoactive bowel sounds   Musculoskeletal:         General: Normal range of motion.   Skin:     General: Skin is warm and dry.   Neurological:      Mental Status: He is alert.       Significant Labs:  CBC:  Recent Labs   Lab 08/21/22  2321 08/22/22  0510 08/23/22  0434   WBC 15.58* 12.27 9.77   RBC 2.89* 2.75* 2.66*   HGB 7.9* 7.8* 7.5*   HCT 27.4* 27.3* 25.4*    134* 135*   MCV 95 99* 96   MCH 27.3 28.4 28.2   MCHC 28.8* 28.6* 29.5*     BNP:  Recent Labs   Lab 08/17/22  0343 08/19/22  0523 08/22/22  0510   * 482*  482* 886*     CMP:  Recent Labs   Lab 08/20/22  0430  08/21/22 0444 08/22/22  0510 08/23/22  0434   GLU 68* 67* 69* 66*   CALCIUM 9.0 8.9 8.9 8.7   ALBUMIN 2.2* 2.1* 2.1*  --    PROT 6.0 5.8* 5.9*  --     135* 130* 129*   K 5.0 4.7 4.7 4.8   CO2 24 24 26 26    105 100 99   BUN 32* 24* 21* 22*   CREATININE 1.1 1.0 1.1 1.2   ALKPHOS 225* 208* 182*  --    ALT 64* 58* 58*  --    AST 43* 37 34  --    BILITOT 1.8* 1.7* 1.6*  --       Coagulation:   Recent Labs   Lab 08/21/22 0444 08/22/22  0510 08/22/22  1304 08/22/22  2103 08/23/22  0434   INR 1.0 1.0  --   --  1.1   APTT 50.6* 56.1* 55.2* 42.6* 43.0*     LDH:  Recent Labs   Lab 08/21/22 0444 08/22/22  0510 08/23/22  0434   * 282* 260     Microbiology:  Microbiology Results (last 7 days)       ** No results found for the last 168 hours. **            I have reviewed all pertinent labs within the past 24 hours.    Estimated Creatinine Clearance: 78.6 mL/min (based on SCr of 1.2 mg/dL).    Diagnostic Results:  I have reviewed all pertinent imaging results/findings within the past 24 hours.

## 2022-08-23 NOTE — PT/OT/SLP PROGRESS
Occupational Therapy      Patient Name:  Tim Richards   MRN:  [id number]    Pt off unit this AM for MBSS.   OT returned this afternoon for therapy. Pt found supine in bed with HOB elevated. OT introduced yellow theraputty including purpose of putty, care for putty and specific exs to perform with the putty. Pt verb and demo understanding. Pt able to manipulate putty including motions needed to manage  LVAD connections such as turning/pulling.   Putty did stick to his silicone ring for which OT was able to remove putty with water and advised pt to remove the ring next time he uses the putty.  Medical staff then arriving to room to complete ECHO; thus, further functional mobility/ADL skills deferred.   Pt left in room with needs in reach.     Therapeutic Acitvity x 13 min.     8/23/2022

## 2022-08-23 NOTE — NURSING
0845 Pt escorted to barium swallow study by self. Pt on monitor, O2, wound vac and heparin gtt. LVAD emergency bag with pt.     Staff not ready for swallow study at this time.    Pt taken for CT scan.    Staff then ready for pt to to have barium swallow test.    1015 pt returned to floor. Pt stable. Call bell within reach. All lines remain intact. Bed in lowest position.   F: no IVF  E: Replete PRN  N: Reg diet  PPX: No VTE ppx indicated, IMPROVE score 0; Droplet precautions    Full Code  DISPO: RMF, pending transfer to [facility].

## 2022-08-24 LAB
ALBUMIN SERPL BCP-MCNC: 2.2 G/DL (ref 3.5–5.2)
ALLENS TEST: ABNORMAL
ALP SERPL-CCNC: 166 U/L (ref 55–135)
ALT SERPL W/O P-5'-P-CCNC: 56 U/L (ref 10–44)
ANION GAP SERPL CALC-SCNC: 5 MMOL/L (ref 8–16)
ANION GAP SERPL CALC-SCNC: 5 MMOL/L (ref 8–16)
APTT BLDCRRT: 42 SEC (ref 21–32)
AST SERPL-CCNC: 32 U/L (ref 10–40)
BASOPHILS # BLD AUTO: 0 K/UL (ref 0–0.2)
BASOPHILS NFR BLD: 0 % (ref 0–1.9)
BILIRUB SERPL-MCNC: 1.3 MG/DL (ref 0.1–1)
BNP SERPL-MCNC: 568 PG/ML (ref 0–99)
BUN SERPL-MCNC: 17 MG/DL (ref 6–20)
BUN SERPL-MCNC: 18 MG/DL (ref 6–20)
CALCIUM SERPL-MCNC: 8.7 MG/DL (ref 8.7–10.5)
CALCIUM SERPL-MCNC: 8.9 MG/DL (ref 8.7–10.5)
CHLORIDE SERPL-SCNC: 100 MMOL/L (ref 95–110)
CHLORIDE SERPL-SCNC: 99 MMOL/L (ref 95–110)
CO2 SERPL-SCNC: 28 MMOL/L (ref 23–29)
CO2 SERPL-SCNC: 28 MMOL/L (ref 23–29)
CREAT SERPL-MCNC: 1.1 MG/DL (ref 0.5–1.4)
CREAT SERPL-MCNC: 1.1 MG/DL (ref 0.5–1.4)
CRP SERPL-MCNC: 26.2 MG/L (ref 0–8.2)
DELSYS: ABNORMAL
DIFFERENTIAL METHOD: ABNORMAL
EOSINOPHIL # BLD AUTO: 0 K/UL (ref 0–0.5)
EOSINOPHIL NFR BLD: 0.3 % (ref 0–8)
ERYTHROCYTE [DISTWIDTH] IN BLOOD BY AUTOMATED COUNT: 18 % (ref 11.5–14.5)
EST. GFR  (NO RACE VARIABLE): >60 ML/MIN/1.73 M^2
EST. GFR  (NO RACE VARIABLE): >60 ML/MIN/1.73 M^2
FIO2: 60
FLOW: 12
GLUCOSE SERPL-MCNC: 66 MG/DL (ref 70–110)
GLUCOSE SERPL-MCNC: 68 MG/DL (ref 70–110)
HCO3 UR-SCNC: 27.6 MMOL/L (ref 24–28)
HCT VFR BLD AUTO: 28.2 % (ref 40–54)
HGB BLD-MCNC: 7.9 G/DL (ref 14–18)
IMM GRANULOCYTES # BLD AUTO: 0.06 K/UL (ref 0–0.04)
IMM GRANULOCYTES NFR BLD AUTO: 0.8 % (ref 0–0.5)
INR PPP: 1.2 (ref 0.8–1.2)
LDH SERPL L TO P-CCNC: 298 U/L (ref 110–260)
LYMPHOCYTES # BLD AUTO: 0.4 K/UL (ref 1–4.8)
LYMPHOCYTES NFR BLD: 4.9 % (ref 18–48)
MAGNESIUM SERPL-MCNC: 2.2 MG/DL (ref 1.6–2.6)
MCH RBC QN AUTO: 27.5 PG (ref 27–31)
MCHC RBC AUTO-ENTMCNC: 28 G/DL (ref 32–36)
MCV RBC AUTO: 98 FL (ref 82–98)
MODE: ABNORMAL
MONOCYTES # BLD AUTO: 0.5 K/UL (ref 0.3–1)
MONOCYTES NFR BLD: 6.1 % (ref 4–15)
NEUTROPHILS # BLD AUTO: 7 K/UL (ref 1.8–7.7)
NEUTROPHILS NFR BLD: 87.9 % (ref 38–73)
NRBC BLD-RTO: 0 /100 WBC
PCO2 BLDA: 48.4 MMHG (ref 35–45)
PH SMN: 7.36 [PH] (ref 7.35–7.45)
PHOSPHATE SERPL-MCNC: 3.4 MG/DL (ref 2.7–4.5)
PLATELET # BLD AUTO: 168 K/UL (ref 150–450)
PMV BLD AUTO: 12.7 FL (ref 9.2–12.9)
PO2 BLDA: 69 MMHG (ref 80–100)
POC BE: 2 MMOL/L
POC SATURATED O2: 93 % (ref 95–100)
POC TCO2: 29 MMOL/L (ref 23–27)
POCT GLUCOSE: 140 MG/DL (ref 70–110)
POCT GLUCOSE: 69 MG/DL (ref 70–110)
POCT GLUCOSE: 80 MG/DL (ref 70–110)
POCT GLUCOSE: 86 MG/DL (ref 70–110)
POCT GLUCOSE: 93 MG/DL (ref 70–110)
POTASSIUM SERPL-SCNC: 4.5 MMOL/L (ref 3.5–5.1)
POTASSIUM SERPL-SCNC: 4.9 MMOL/L (ref 3.5–5.1)
PREALB SERPL-MCNC: 23 MG/DL (ref 20–43)
PROT SERPL-MCNC: 5.9 G/DL (ref 6–8.4)
PROTHROMBIN TIME: 12.2 SEC (ref 9–12.5)
RBC # BLD AUTO: 2.87 M/UL (ref 4.6–6.2)
SAMPLE: ABNORMAL
SITE: ABNORMAL
SODIUM SERPL-SCNC: 132 MMOL/L (ref 136–145)
SODIUM SERPL-SCNC: 133 MMOL/L (ref 136–145)
TRIGL SERPL-MCNC: 79 MG/DL (ref 30–150)
WBC # BLD AUTO: 7.97 K/UL (ref 3.9–12.7)

## 2022-08-24 PROCEDURE — 99233 PR SUBSEQUENT HOSPITAL CARE,LEVL III: ICD-10-PCS | Mod: 25,,, | Performed by: INTERNAL MEDICINE

## 2022-08-24 PROCEDURE — 85025 COMPLETE CBC W/AUTO DIFF WBC: CPT | Performed by: STUDENT IN AN ORGANIZED HEALTH CARE EDUCATION/TRAINING PROGRAM

## 2022-08-24 PROCEDURE — 84134 ASSAY OF PREALBUMIN: CPT | Performed by: INTERNAL MEDICINE

## 2022-08-24 PROCEDURE — 25000003 PHARM REV CODE 250

## 2022-08-24 PROCEDURE — 25000003 PHARM REV CODE 250: Performed by: STUDENT IN AN ORGANIZED HEALTH CARE EDUCATION/TRAINING PROGRAM

## 2022-08-24 PROCEDURE — 99233 SBSQ HOSP IP/OBS HIGH 50: CPT | Mod: 25,,, | Performed by: INTERNAL MEDICINE

## 2022-08-24 PROCEDURE — 82800 BLOOD PH: CPT

## 2022-08-24 PROCEDURE — 84478 ASSAY OF TRIGLYCERIDES: CPT | Performed by: INTERNAL MEDICINE

## 2022-08-24 PROCEDURE — 82803 BLOOD GASES ANY COMBINATION: CPT

## 2022-08-24 PROCEDURE — 25000003 PHARM REV CODE 250: Performed by: INTERNAL MEDICINE

## 2022-08-24 PROCEDURE — 85730 THROMBOPLASTIN TIME PARTIAL: CPT | Performed by: INTERNAL MEDICINE

## 2022-08-24 PROCEDURE — 25000242 PHARM REV CODE 250 ALT 637 W/ HCPCS

## 2022-08-24 PROCEDURE — 36600 WITHDRAWAL OF ARTERIAL BLOOD: CPT

## 2022-08-24 PROCEDURE — 27100171 HC OXYGEN HIGH FLOW UP TO 24 HOURS

## 2022-08-24 PROCEDURE — 80048 BASIC METABOLIC PNL TOTAL CA: CPT | Mod: XB | Performed by: INTERNAL MEDICINE

## 2022-08-24 PROCEDURE — 80053 COMPREHEN METABOLIC PANEL: CPT | Performed by: STUDENT IN AN ORGANIZED HEALTH CARE EDUCATION/TRAINING PROGRAM

## 2022-08-24 PROCEDURE — 84100 ASSAY OF PHOSPHORUS: CPT | Performed by: STUDENT IN AN ORGANIZED HEALTH CARE EDUCATION/TRAINING PROGRAM

## 2022-08-24 PROCEDURE — 94761 N-INVAS EAR/PLS OXIMETRY MLT: CPT

## 2022-08-24 PROCEDURE — 97112 NEUROMUSCULAR REEDUCATION: CPT

## 2022-08-24 PROCEDURE — 83880 ASSAY OF NATRIURETIC PEPTIDE: CPT | Performed by: INTERNAL MEDICINE

## 2022-08-24 PROCEDURE — 93750 INTERROGATION VAD IN PERSON: CPT | Mod: ,,, | Performed by: INTERNAL MEDICINE

## 2022-08-24 PROCEDURE — 83735 ASSAY OF MAGNESIUM: CPT | Performed by: STUDENT IN AN ORGANIZED HEALTH CARE EDUCATION/TRAINING PROGRAM

## 2022-08-24 PROCEDURE — 63600175 PHARM REV CODE 636 W HCPCS: Performed by: STUDENT IN AN ORGANIZED HEALTH CARE EDUCATION/TRAINING PROGRAM

## 2022-08-24 PROCEDURE — 86140 C-REACTIVE PROTEIN: CPT | Performed by: INTERNAL MEDICINE

## 2022-08-24 PROCEDURE — 99900026 HC AIRWAY MAINTENANCE (STAT)

## 2022-08-24 PROCEDURE — 97530 THERAPEUTIC ACTIVITIES: CPT

## 2022-08-24 PROCEDURE — 83615 LACTATE (LD) (LDH) ENZYME: CPT | Performed by: INTERNAL MEDICINE

## 2022-08-24 PROCEDURE — 93750 PR INTERROGATE VENT ASSIST DEV, IN PERSON, W PHYSICIAN ANALYSIS: ICD-10-PCS | Mod: ,,, | Performed by: INTERNAL MEDICINE

## 2022-08-24 PROCEDURE — 99223 1ST HOSP IP/OBS HIGH 75: CPT | Mod: ,,, | Performed by: OTOLARYNGOLOGY

## 2022-08-24 PROCEDURE — 99900035 HC TECH TIME PER 15 MIN (STAT)

## 2022-08-24 PROCEDURE — 20600001 HC STEP DOWN PRIVATE ROOM

## 2022-08-24 PROCEDURE — 94640 AIRWAY INHALATION TREATMENT: CPT

## 2022-08-24 PROCEDURE — 85610 PROTHROMBIN TIME: CPT | Performed by: STUDENT IN AN ORGANIZED HEALTH CARE EDUCATION/TRAINING PROGRAM

## 2022-08-24 PROCEDURE — 99223 PR INITIAL HOSPITAL CARE,LEVL III: ICD-10-PCS | Mod: ,,, | Performed by: OTOLARYNGOLOGY

## 2022-08-24 PROCEDURE — 27000248 HC VAD-ADDITIONAL DAY

## 2022-08-24 PROCEDURE — 27200966 HC CLOSED SUCTION SYSTEM

## 2022-08-24 RX ORDER — FUROSEMIDE 20 MG/1
20 TABLET ORAL DAILY
Status: DISCONTINUED | OUTPATIENT
Start: 2022-08-24 | End: 2022-08-25

## 2022-08-24 RX ORDER — FUROSEMIDE 10 MG/ML
20 INJECTION INTRAMUSCULAR; INTRAVENOUS ONCE
Status: COMPLETED | OUTPATIENT
Start: 2022-08-24 | End: 2022-08-24

## 2022-08-24 RX ADMIN — HEPARIN SODIUM 19 UNITS/KG/HR: 5000 INJECTION INTRAVENOUS; SUBCUTANEOUS at 10:08

## 2022-08-24 RX ADMIN — WARFARIN SODIUM 6 MG: 3 TABLET ORAL at 04:08

## 2022-08-24 RX ADMIN — MIRTAZAPINE 15 MG: 15 TABLET, FILM COATED ORAL at 08:08

## 2022-08-24 RX ADMIN — Medication 400 MG: at 08:08

## 2022-08-24 RX ADMIN — POTASSIUM BICARBONATE 20 MEQ: 391 TABLET, EFFERVESCENT ORAL at 12:08

## 2022-08-24 RX ADMIN — PREDNISONE 20 MG: 20 TABLET ORAL at 08:08

## 2022-08-24 RX ADMIN — HEPARIN SODIUM 19 UNITS/KG/HR: 5000 INJECTION INTRAVENOUS; SUBCUTANEOUS at 05:08

## 2022-08-24 RX ADMIN — LEVALBUTEROL HYDROCHLORIDE 0.63 MG: 0.63 SOLUTION RESPIRATORY (INHALATION) at 08:08

## 2022-08-24 RX ADMIN — FUROSEMIDE 20 MG: 10 INJECTION, SOLUTION INTRAMUSCULAR; INTRAVENOUS at 12:08

## 2022-08-24 RX ADMIN — LEVALBUTEROL HYDROCHLORIDE 0.63 MG: 0.63 SOLUTION RESPIRATORY (INHALATION) at 01:08

## 2022-08-24 RX ADMIN — PANTOPRAZOLE SODIUM 40 MG: 40 TABLET, DELAYED RELEASE ORAL at 08:08

## 2022-08-24 RX ADMIN — MECLIZINE HYDROCHLORIDE 25 MG: 25 TABLET ORAL at 11:08

## 2022-08-24 RX ADMIN — MEXILETINE HYDROCHLORIDE 400 MG: 200 CAPSULE ORAL at 06:08

## 2022-08-24 RX ADMIN — ASPIRIN 81 MG CHEWABLE TABLET 81 MG: 81 TABLET CHEWABLE at 08:08

## 2022-08-24 RX ADMIN — LEVALBUTEROL HYDROCHLORIDE 0.63 MG: 0.63 SOLUTION RESPIRATORY (INHALATION) at 12:08

## 2022-08-24 RX ADMIN — MEXILETINE HYDROCHLORIDE 400 MG: 200 CAPSULE ORAL at 01:08

## 2022-08-24 RX ADMIN — MEXILETINE HYDROCHLORIDE 400 MG: 200 CAPSULE ORAL at 09:08

## 2022-08-24 RX ADMIN — SENNOSIDES AND DOCUSATE SODIUM 1 TABLET: 50; 8.6 TABLET ORAL at 08:08

## 2022-08-24 RX ADMIN — AMIODARONE HYDROCHLORIDE 200 MG: 200 TABLET ORAL at 08:08

## 2022-08-24 RX ADMIN — FUROSEMIDE 20 MG: 20 TABLET ORAL at 01:08

## 2022-08-24 NOTE — PROGRESS NOTES
"   08/24/22 1700        VAD 07/13/22 1300 Left ventricular assist device HeartMate 3   Placement Date/Time: 07/13/22 1300   Present Prior to Hospital Arrival?: No  Inserted by: MD  VAD Type: Left ventricular assist device  VAD Brand: HeartMate 3   Site Location Abdomen left   Site Assessment Intact;Draining;Sutures intact   Driveline Exit Site 2   Driveline Assessment Free of Kinks;Intact;Modular cable Clean and Locked   Dressing Status Clean;Dry;Intact   Dressing Intervention Sterile dressing change   Performed By RN   Dressing Change Schedule Daily   Dressing Change Due 08/25/22   LVAD dressing change completed using sterile technique with daily kit. DLES is a "2" with minimal drainage noted on the drain sponge. Tolerated without any complication. Sutures remain intact, no redness, or tenderness noted.    "

## 2022-08-24 NOTE — HPI
56M currently hospitalized for LVAD complication who has had a prolonged hospital course including ECMO dependence, tracheostomy creation on 8/4 who has had 2-3 episodes of room-spinning vertigo in the last week. He states that these episodes are about 3 hours long and are generally self-limited. They are exacerbated by moving his head to the left or right, but are on the whole non-provoked. He does report that his face feels very flushed, he gets hot and his BP shoots up when these episodes happen. He does report some visual disturbances, primarily blurry vision and double vision which have resolved.  He denies any hearing changes or tinnitus when this happens. He denies aural fullness. He denies any history of ear surgery.  No episodes since last night. ENT has been consulted for evaluation.

## 2022-08-24 NOTE — PROGRESS NOTES
MD Jonelle, called for patient newly c/o 8/10 dizziness/vertigo as well as new onset SOB. Doppler pressure is reading 100/0. Doppler pressure taken again and resulted as 98/0. Patient sating 94-96% and RR are 30-32. Doppler pressure retaken after 5 minutes and is 100/0. MD Jonelle, at bedside. Charge RN x2 at bedside. Patient having pupillary changes, R side now sluggish and had previously not been sluggish. Patient also has new onset crackles, previously has had clear breath sounds. STAT CXR ordered. STAT ABG ordered. RRT at bedside to stick patient for ABG. Awaiting other new orders

## 2022-08-24 NOTE — CONSULTS
[facility] - Cardiology Stepdown  Adult Nutrition  Consult Note    SUMMARY     Recommendations  1. When medically appropriate rec'd modifying to cardiac diet-fluid per MD (encourage adequate intake)   2. Continue Boost Plus TID for optimization of protein and calorie intake   3.  If pt solely receiving nutrition through TPN: rec' custom TPN to                 - 336 gD + 122 g AA + IV lipids (GIR 2.85)                   -this provides 2132 kcals, 122 g of protein  4. If TF recs warranted, rec'd [employer] 1.5 to goal rate @ 60 ml/hr to provide 2215 kcals, 107 g of protein, and 1008 ml of fluid.    5. RD following    Goals: Meet % of EEN/EPN by RD f/u  Nutrition Goal Status: progressing towards goal  Communication of RD Recs: other (POC)    Assessment and Plan    Nutrition Problem:  Moderate Protein-Calorie Malnutrition  Malnutrition in the context of Acute Illness/Injury      Related to (etiology):  Inadequate energy intake, NPO, intubation      Signs and Symptoms (as evidenced by):  Energy Intake: <50% of estimated energy requirement x 5 days  Weight Loss: ~9% x <1 month  Fluid Accumulation: mild (1-2+ edema)     Interventions(treatment strategy):  Collaboration of nutrition care with other providers  Enteral nutrition      Nutrition Diagnosis Status:  Continues     Reason for Assessment    Reason For Assessment: RD follow-up/consult  Diagnosis:  (LVAD complication)  Relevant Medical History: CHF, gout, cardiomyopathy  Interdisciplinary Rounds: attended  General Information Comments: RD consulted for dietary recommendations for insufficient caloric intake/possible PPN vs. supplements. Spoke w/ pt at bedside, reports receiving cream of wheat, yogurt w/ whole white milk, this morning for breakfast and consuming some of food provided. Pt reports inputting food preferences for lunch and dinner w/ CA. Reports ordering a fruit plate for lunch. Pt is currently advanced to a regular diet. RD added ONS-Boost Plus to pt  "mydining for pt to receive. Recommendations provided for pt, encouraged adequate nutrition intake during time of visit. Visual NFPE completed 8/24/22, pt, Pt continues to meet criteria for moderate malnutrition in the context of acute illness/injury  LBM noted-8/23/22  Nutrition Discharge Planning: adequate nutrition intake    Nutrition Risk Screen    Nutrition Risk Screen: no indicators present    Nutrition/Diet History    Spiritual, Cultural Beliefs, Amish Practices, Values that Affect Care: no  Food Allergies: NKFA    Anthropometrics    Temp: 98.4 °F (36.9 °C)  Height Method: Stated  Height: 6' 1" (185.4 cm)  Height (inches): 73 in  Weight Method: Bed Scale  Weight: 90.9 kg (200 lb 8 oz)  Weight (lb): 200.5 lb  Ideal Body Weight (IBW), Male: 184 lb  % Ideal Body Weight, Male (lb): 125.54 %  BMI (Calculated): 26.5  BMI Grade: 25 - 29.9 - overweight       Lab/Procedures/Meds    Pertinent Labs Reviewed: reviewed  Pertinent Labs Comments: Sodium 132, Glucose 68  Pertinent Medications Reviewed: reviewed  Pertinent Medications Comments: coumadin      Estimated/Assessed Needs    Weight Used For Calorie Calculations: 90.9 kg (200 lb 6.4 oz)  Energy Calorie Requirements (kcal): 2156 (PAL 1.2)  Energy Need Method: [place]  Protein Requirements: 109 g (1.2 g/kg)  Weight Used For Protein Calculations: 90.9 kg (200 lb 6.4 oz)  Fluid Requirements (mL): 1 ml or fluid per MD     RDA Method (mL): 2156         Nutrition Prescription Ordered    Current Diet Order: Regular diet    Evaluation of Received Nutrient/Fluid Intake    I/O: + 1.1 L since 8/10  Energy Calories Required: not meeting needs  Protein Required: not meeting needs  Fluid Required: not meeting needs  Total Fluid Intake (mL/kg): 1 ml or fluid per MD  Tolerance: tolerating  % Intake of Estimated Energy Needs: 50%   Meal Intake: 50%    Nutrition Risk    Level of Risk/Frequency of Follow-up: low ((1 x/week))       Monitor and Evaluation    Food and Nutrient " Intake: energy intake, food and beverage intake, enteral nutrition intake, parenteral nutrition intake  Food and Nutrient Adminstration: diet order, enteral and parenteral nutrition administration  Knowledge/Beliefs/Attitudes: food and nutrition knowledge/skill  Physical Activity and Function: nutrition-related ADLs and IADLs  Anthropometric Measurements: weight, weight change  Biochemical Data, Medical Tests and Procedures: electrolyte and renal panel, gastrointestinal profile, glucose/endocrine profile, inflammatory profile, lipid profile  Nutrition-Focused Physical Findings: overall appearance, extremities, muscles and bones       Nutrition Follow-Up    RD Follow-up?: Yes

## 2022-08-24 NOTE — PLAN OF CARE
Recommendations  1. When medically appropriate rec'd modifying to cardiac diet-fluid per MD (encourage adequate intake)   2. Continue Boost Plus TID for optimization of protein and calorie intake   3.  If pt solely receiving nutrition through TPN: rec' custom TPN to                            - 336 gD + 122 g AA + IV lipids (GIR 2.85)                              -this provides 2132 kcals, 122 g of protein  4. If TF recs warranted, rec'd [employer] 1.5 to goal rate @ 60 ml/hr to provide 2215 kcals, 107 g of protein, and 1008 ml of fluid.    5. RD following     Goals: Meet % of EEN/EPN by RD f/u  Nutrition Goal Status: progressing towards goal  Communication of RD Recs: other (POC)

## 2022-08-24 NOTE — ASSESSMENT & PLAN NOTE
56M with dizziness/vertigo of uncertain etiology. Otologic exam normal. History is not revealing for central versus peripheral etiology of vertigo.     No acute ENT surgical intervention indicated.   Can consider MRI brain to rule out central etiology if clinically indicated  Can continue meclizine prn vertigo, would not advise scheduling this medicine as it can cause dysequilibrium if taken for long periods of time.

## 2022-08-24 NOTE — ASSESSMENT & PLAN NOTE
-Previously on HM2 but complicated by thrombosis related to COVID  -Underwent HM III upgrade on 7/13/22, currently on [facility]O  -Volume status: Currently fluid overloaded, will continue lasix PO at 20mg daily  -Chest tube removal, bronch, and old driveline removed 7/22  -Hyponatremia improving  -Currently being bridged with heparin, continue coumadin, currently on dose of 6mg, INR of 1.2 today  -

## 2022-08-24 NOTE — PROGRESS NOTES
[facility] - Cardiology Stepdown  Endocrinology  Progress Note    Admit Date: 2022     56 year-old  male with NICM with LVAD, s/p ICD for VT on amiodarone and mexiletine and AIT followed by hypothyroidism initially admitted 2022 with COVID respiratory failure complicated with LVAD pump thrombosis that was refractory to medical therapy. Underwent LVAD pump exchange. Post-op course complicated by worsening cardiogenic shock/RV failure requiring VA-ECMO. Improved clinically underwent successful decannulation. Continued episodes of VT with ICD shock  and ongoing anti-arrhythmic mediation adjustments. TFTs on  significant for recurrent hyperthyroidism with undetectable TSH and elevated fT4.    - Patient re-intubated 2022    - Endocrinology consulted for recurrent AIT    Regarding AIT:    - Last seen outpatient by Dr. 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 He required a prolonged course of prednisone and methimazole, then subsequently developed hypothyroidism in May 2020. LT4 was adjusted based on serial TFT measurements. Most recently levothyroxine dose has been 112 mcg     - He self-decreased his amiodarone dose to 200 mg daily about 6 months ago. T4 was high on , but he was continued on levothyroxine 112 mcg    Lab Results   Component Value Date    TSH <0.010 (L) 2022    TSH 0.111 (L) 2022    TSH 4.079 (H) 2022    FREET4 2.51 (H) 2022    FREET4 2.95 (H) 2022    FREET4 2.18 (H) 2022      Latest Reference Range & Units 22 03:42 22 03:10 22 03:29 08/10/22 03:33   Free T4 0.71 - 1.51 ng/dL 2.51 (H) 2.43 (H) 2.23 (H) 2.12 (H)       Interval HPI:   Overnight events: Doing well this AM.  Low glucose this AM again  Eatin%  Nausea: No  Hypoglycemia and intervention: Yes  Fever: No  TPN and/or TF: No  If yes, type of TF/TPN and rate: N/A    BP  "(!) 68/0 (BP Location: Left arm, Patient Position: Lying)   Pulse 74   Temp 98.5 °F (36.9 °C)   Resp 16   Ht 6' 1" (1.854 m)   Wt 93.6 kg (206 lb 4.8 oz)   SpO2 96%   BMI 27.22 kg/m²     Labs Reviewed and Include    Recent Labs   Lab 08/23/22  0434   GLU 66*   CALCIUM 8.7   *   K 4.8   CO2 26   CL 99   BUN 22*   CREATININE 1.2     Lab Results   Component Value Date    WBC 9.77 08/23/2022    HGB 7.5 (L) 08/23/2022    HCT 25.4 (L) 08/23/2022    MCV 96 08/23/2022     (L) 08/23/2022     Recent Labs   Lab 08/19/22  0523 08/21/22  0444   TSH  --  3.940   FREET4 0.98 0.93     Lab Results   Component Value Date    HGBA1C 5.4 04/26/2021       Nutritional status:   Body mass index is 27.22 kg/m².  Lab Results   Component Value Date    ALBUMIN 2.1 (L) 08/22/2022    ALBUMIN 2.1 (L) 08/21/2022    ALBUMIN 2.2 (L) 08/20/2022     Lab Results   Component Value Date    PREALBUMIN 23 08/22/2022    PREALBUMIN 30 08/19/2022    PREALBUMIN 32 08/11/2022       Estimated Creatinine Clearance: 78.6 mL/min (based on SCr of 1.2 mg/dL).    Accu-Checks  Recent Labs     08/20/22  2005 08/21/22  1234 08/21/22  1521 08/23/22  1112   POCTGLUCOSE 81 69* 137* 103       Current Medications and/or Treatments Impacting Glycemic Control  Immunotherapy:    Immunosuppressants       None          Steroids:   Hormones (From admission, onward)                Start     Stop Route Frequency Ordered    08/23/22 1645  melatonin tablet 6 mg         -- Oral Nightly PRN 08/23/22 1646    08/19/22 0900  predniSONE tablet 20 mg         -- Oral Daily 08/18/22 1620          Pressors:    Autonomic Drugs (From admission, onward)                Start     Stop Route Frequency Ordered    07/29/22 0011  EPINEPHrine (ADRENALIN) 1 mg/mL injection        Note to Pharmacy: Created by cabinet override    07/29 1214   07/29/22 0011          Hyperglycemia/Diabetes Medications:   Antihyperglycemics (From admission, onward)                None            ASSESSMENT " and PLAN    * Left ventricular assist device (LVAD) complication  LVAD in place, continues to have runs of VT and remains on amiodarone  Management by primary      Amiodarone-induced hyperthyroidism  Key History and Diagnostic Findings  - History of AIT type 2 (TRAb and TSI negative; Thyroid US unremarkable with low vascularity)  - Weaned off methimazole and prednisone by May 2020, then developed hypothyroidism, Levothyroxine started and dose titrated per TFTs. Prior to current admission he was on Levothyroxine 112 mcg daily  - Labs consistent with hypothyroidism until current admission, initially on 7/13, with low TSH and elevated fT4. Repeat TFTs on 7/27 with worsening hyperthyroidism. He continued to receive LT4 112 mcg through 7/28. He remains on amiodarone for episodes of Ventricular Tachycardia  - Suspect current hyperthyroidism is likely due to AIT-2, however continued exogenous LT4 certainly contribute to current presentation   - fT4 now normal    Lab Results   Component Value Date    TSH 3.940 08/21/2022    TSH 0.127 (L) 08/14/2022    TSH <0.010 (L) 07/27/2022    FREET4 0.93 08/21/2022    FREET4 0.98 08/19/2022    FREET4 1.24 08/16/2022     Plan  - Strongly suspect AIT type 2 with improvement seen currently with the use of prednisone.   - Methimazole stopped on 08/22/2022 continues on prednisone 20 mg daily for now.   - Will plan repeat labs and consider tapering prednisone while inpatient if FT4 continues to downtrend   - Trend TFTs q48-72h       amiodarone induced hypothyrodism  - Levothyroxine discontinued on 7/28/2022  - Please see plan as above    VT (ventricular tachycardia)  Recurrent VT requiring mexiletine and chronic amiodarone   Managed per primary     Anticoagulation monitoring, INR range 2-3  In pts on amiodarone and warfarin, amiodarone-induced thyroid dysfunction can significantly influence warfarin response. The effect of warfarin is potentiated by thyrotoxicosis and attenuated in  hypothyroidism. Close monitoring of INR recommended.      Hypoglycemia  Key History and Diagnostic Findings  - Initially hyperglycemia noted once starting TPN and later TF in addition to steroids and critical illness when in the ICU  - No prior history of diabetes; most recent A1c of 5.4 on 04/26/2021  - With decreasing steroid dose and overall clinical improvement as well as poor po intake, now with mild asymptomatic hypoglycemia intermittently.    Plan  - Continue accuchecks ACHS and would optimize nutrition, attempting po,although insufficient po intake currently  - Please notify endocrine if any change in diet or tube feeds as this will change insulin requirements.  - discontinued insulin, not requiring sliding scale correction and has been hypoglycemic intermittently          Osmar Vergara, DO  Endocrinology

## 2022-08-24 NOTE — NURSING
Contacted by PT/OT about patient complaining of double vision and dizziness. Nurse went to bedside and patient reported dizziness subsided but double vision still present. Team notified, no new orders given. MD is to come to bedside. Will continue to monitor.

## 2022-08-24 NOTE — ASSESSMENT & PLAN NOTE
Key History and Diagnostic Findings  - History of AIT type 2 (TRAb and TSI negative; Thyroid US unremarkable with low vascularity)  - Weaned off methimazole and prednisone by May 2020, then developed hypothyroidism, Levothyroxine started and dose titrated per TFTs. Prior to current admission he was on Levothyroxine 112 mcg daily  - Labs consistent with hypothyroidism until current admission, initially on 7/13, with low TSH and elevated fT4. Repeat TFTs on 7/27 with worsening hyperthyroidism. He continued to receive LT4 112 mcg through 7/28. He remains on amiodarone for episodes of Ventricular Tachycardia  - Suspect current hyperthyroidism is likely due to AIT-2, however continued exogenous LT4 certainly contribute to current presentation   - fT4 now normal    Lab Results   Component Value Date    TSH 3.940 08/21/2022    TSH 0.127 (L) 08/14/2022    TSH <0.010 (L) 07/27/2022    FREET4 0.93 08/21/2022    FREET4 0.98 08/19/2022    FREET4 1.24 08/16/2022     Plan  - Strongly suspect AIT type 2 with improvement seen currently with the use of prednisone.   - Methimazole stopped on 08/22/2022 continues on prednisone 20 mg daily for now.   - Will plan repeat labs and consider tapering prednisone while inpatient if FT4 continues to downtrend   - Trend TFTs q48-72h

## 2022-08-24 NOTE — PT/OT/SLP PROGRESS
Physical Therapy Co-Treatment    Patient Name:  Tim Richards   MRN:  [id number]    Recommendations:     Discharge Recommendations:  rehabilitation facility   Discharge Equipment Recommendations: other (see comments) (TBD)   Barriers to discharge: decreased functional mobility, fall risk, decreased caregiver support and inaccessible home    Assessment:     Tim Richards is a 56 y.o. male admitted with a medical diagnosis of Left ventricular assist device (LVAD) complication.  Pt demonstrates the below listed impairments with decreased tolerance to functional mobility, weakness and impaired cardiopulmonary response to activity being the most limiting.  Pt demonstrates fairly poor tolerance to edge of bed mobility.  He has L beating nystagmus and dizziness as well as diplopia with reaching EOB.  R pupil noted to constrict.  Pt noted to have difficulty with smooth pursuits as well, pt placed back in bed and RN called to room.  Pt is not safe for home discharge at this time due to patient's status as: a fall risk and patient has increased pain and requires skilled PT.      Impairments and functional limitations:  weakness, impaired endurance, impaired self care skills, impaired functional mobility, gait instability, impaired balance, decreased upper extremity function, decreased lower extremity function, decreased ROM, impaired coordination, impaired cardiopulmonary response to activity.  These deficits affect their roles and responsibilities in which they were able to complete prior to admit.  Rehab Prognosis:   Good ; patient would benefit from acute skilled PT services 5 x/week to address these deficits, improve quality of life, focus on recovery of impairments, provide patient/caregiver education, reduce fall risk, and reach maximum level of function.  Pt is highly  motivated to participated in skilled PT.    Recent Surgery:   Procedure(s) (LRB):  CREATION, TRACHEOSTOMY (N/A)  INSERTION, CENTRAL VENOUS ACCESS  "DEVICE 20 Days Post-Op    Plan:     During this hospitalization, patient to be seen 5 x/week to address the identified rehab impairments via gait training, therapeutic activities, therapeutic exercises, neuromuscular re-education and progress toward the following goals:    · Plan of Care Expires:  09/15/22    Subjective     Chief Complaint: "I'm seeing double"  Patient/Family Comments/Goals: Progress to inpatient rehab  Pain/Comfort:  Pain Rating 1: 0/10    Objective:     Communicated with RN prior to session.  Patient found HOB elevated with wound vac, telemetry, pulse ox (continuous), oxygen, LVAD, peripheral IV, Tracheostomy upon PT entry to room.     General Precautions: Standard, fall, LVAD, sternal   Orthopedic Precautions:N/A   Braces: N/A  Oxygen Device:      Functional Mobility:  · Bed Mobility:  Rolling Left: stand by assistance  · Scooting: stand by assistance  · Supine to Sit: stand by assistance  · Sit to Supine: stand by assistance  · Scooting up to head of bed in supine: maximal assistance of 2 persons  · Head of bed position: HOB elevated   · EOB with SBA for ~7min, dizziness resolved after ~2min of sitting EOB    · Transfers:  n/a, pt not safe for transfers    · Gait: n/a, pt not safe for ambulation    · Balance:   Position Score Time   Static Sitting FAIR: Maintains without assist, but is unable to take any challenges 3 minute(s)   Dynamic Sitting FAIR-: Maintains without assist but is inconsistent 5 minute(s)   Static Standing n/a: dependent n/a   Dynamic Standing n/a: dependent n/a       AM-PAC 6 CLICK MOBILITY  Turning over in bed (including adjusting bedclothes, sheets and blankets)?: 3  Sitting down on and standing up from a chair with arms (e.g., wheelchair, bedside commode, etc.): 2  Moving from lying on back to sitting on the side of the bed?: 3  Moving to and from a bed to a chair (including a wheelchair)?: 2  Need to walk in hospital room?: 2  Climbing 3-5 steps with a railing?: 1  Basic " Mobility Total Score: 13     Therapeutic Activities:  Patient educated on role of acute care PT and PT POC, safety while in hospital including calling nurse for mobility, call light usage, benefits of out of bed mobility, walker management, breathing technique, fall risk, bed mobility , positioning, posture, risks of prolonged bed rest, possible discharge disposition  and benefits of continued PT by explanation and demonstration.    Patient demonstrates good understanding of education provided this day.   Whiteboard updated    Therapeutic Exercises:  Patient participated in: LAQs with 1 sets, 5 reps with no cues.    Patient left HOB elevated with all lines intact, call button in reach and RN present.    GOALS:   Multidisciplinary Problems     Physical Therapy Goals        Problem: Physical Therapy    Goal Priority Disciplines Outcome Goal Variances Interventions   Physical Therapy Goal     PT, PT/OT Ongoing, Progressing     Description: Goals to be met by: 2022    Patient will increase functional independence with mobility by performin. Supine to sit with MInimal Assistance -not met  2. Sit to stand transfer with Contact Guard Assistance -not met  3. Gait  x 150 feet with Contact Guard Assistance with approved assistive device -not met  4. Ascend/descend 8 stair with right Handrails Contact Guard Assistance -not met  5. Sitting at edge of bed x15 minutes with Contact Guard Assistance to improve tolerance to activities. -not met  6. Lower extremity exercise program x15 reps with assistance as needed -not met               Multidisciplinary Problems (Resolved)        Problem: Physical Therapy    Goal Priority Disciplines Outcome Goal Variances Interventions   Physical Therapy Goal   (Resolved)     PT, PT/OT Met     Description: The patient is near his functional baseline, he ambulated within the room with no AD and independence. Unable to do stair training, assess gait endurance due to COVID restrictions.  He is safe to return home with no therapy needs. He is in agreement with PT discharge, he states he is confident he can ascend/descend his stairs.           Problem: Physical Therapy    Goal Priority Disciplines Outcome Goal Variances Interventions   Physical Therapy Goal   (Resolved)     PT, PT/OT Met                     Time Tracking:     PT Received On: 08/24/22  PT Start Time: 0909     PT Stop Time: 0921  PT Total Time (min): 12 min     Billable Minutes: Neuromuscular Re-education 12    Treatment Type: Treatment  PT/PTA: PT     PTA Visit Number: 0     08/24/2022    Co-treatment performed for this visit due to patient need for two skilled therapists to ensure patient and staff safety, to accommodate for patient activity tolerance/pain management, and maximize functional potential.

## 2022-08-24 NOTE — SUBJECTIVE & OBJECTIVE
Medications:  Continuous Infusions:   dextrose 10 % in water (D10W)      heparin (porcine) in D5W 19 Units/kg/hr (08/24/22 0601)     Scheduled Meds:   amiodarone  200 mg Oral Daily    aspirin  81 mg Oral Daily    levalbuterol  0.63 mg Nebulization Q6H    magnesium oxide  400 mg Oral BID    mexiletine  400 mg Oral Q8H    mirtazapine  15 mg Oral QHS    ondansetron  4 mg Intravenous Once    pantoprazole  40 mg Oral Daily    polyethylene glycol  17 g Oral Daily    predniSONE  20 mg Oral Daily    senna-docusate 8.6-50 mg  1 tablet Oral Daily    warfarin  6 mg Oral Daily     PRN Meds:sodium chloride 0.9%, acetaminophen, ALPRAZolam, barium sulfate, barium sulfate, bisacodyL, dextrose 10 % in water (D10W), dextrose 10%, dextrose 10%, glucagon (human recombinant), glucose, guaiFENesin 100 mg/5 ml, HYDROmorphone, HYDROmorphone, hydrOXYzine HCL, meclizine, melatonin, ondansetron     No current facility-administered medications on file prior to encounter.     Current Outpatient Medications on File Prior to Encounter   Medication Sig    allopurinoL (ZYLOPRIM) 100 MG tablet TAKE 1 TABLET (100 MG) BY MOUTH NIGHTLY.    amiodarone (PACERONE) 200 MG Tab Take 2 tablets (400 mg total) by mouth once daily.    aspirin (ECOTRIN) 81 MG EC tablet Take 1 tablet (81 mg total) by mouth once daily.    busPIRone (BUSPAR) 5 MG Tab Take 1 tablet (5 mg total) by mouth 3 (three) times daily.    levothyroxine (SYNTHROID) 112 MCG tablet Take 1 tablet (112 mcg total) by mouth before breakfast.    mexiletine (MEXITIL) 250 MG Cap Take 1 capsule (250 mg total) by mouth every 8 (eight) hours.    pantoprazole (PROTONIX) 40 MG tablet TAKE 1 TABLET (40 MG TOTAL) BY MOUTH DAILY WITH LUNCH.    warfarin (COUMADIN) 5 MG tablet TAKE 7.5 MG ORALLY DAILY EVERY SUNDAY AND THURSDAY, TAKE 5 MG ORALLY DAILY ON OTHER DAYS    [DISCONTINUED] ferrous gluconate (FERGON) 324 MG tablet TAKE 1 TABLET (324 MG TOTAL) BY MOUTH WITH LUNCH.    [DISCONTINUED] sildenafiL (VIAGRA) 100  MG tablet Take 1 tablet (100 mg total) by mouth daily as needed for Erectile Dysfunction.       Review of patient's allergies indicates:   Allergen Reactions    Lisinopril Anaphylaxis    Hydralazine analogues      Chronic constipation, impotence, dizziness       Past Medical History:   Diagnosis Date    Anticoagulant long-term use     CHF (congestive heart failure)     Class 1 obesity due to excess calories with serious comorbidity and body mass index (BMI) of 31.0 to 31.9 in adult     Dilated cardiomyopathy 1/10/2018    Disorder of kidney and ureter     CKD    Encounter for blood transfusion     Gout     HTN (hypertension)     Hx of psychiatric care     ICD (implantable cardioverter-defibrillator) infection 7/1/2020    Psychiatric problem     Thyroid disease     Ventricular tachycardia (paroxysmal)      Past Surgical History:   Procedure Laterality Date    AORTIC VALVULOPLASTY N/A 7/13/2022    Procedure: REPAIR, AORTIC VALVE;  Surgeon: Yg Kaufman MD;  [facility];  Service: Cardiovascular;  Laterality: N/A;    APPLICATION OF WOUND VACUUM-ASSISTED CLOSURE DEVICE N/A 7/15/2022    Procedure: APPLICATION, WOUND VAC;  Surgeon: Yg Kaufman MD;  [facility];  Service: Cardiovascular;  Laterality: N/A;  50 x 5 cm     CARDIAC CATHETERIZATION  Dec. 2012    CARDIAC DEFIBRILLATOR PLACEMENT Left     CRRT-D    COLONOSCOPY N/A 3/6/2018    Procedure: COLONOSCOPY;  Surgeon: Alonso Bone MD;  Location: [facility] (2ND FLR);  Service: Endoscopy;  Laterality: N/A;    COLONOSCOPY N/A 7/17/2019    Procedure: COLONOSCOPY;  Surgeon: Blane Valdez MD;  [facility]  Service: Endoscopy;  Laterality: N/A;    COLONOSCOPY N/A 7/18/2019    Procedure: COLONOSCOPY;  Surgeon: Blane Valdez MD;  [facility]  Service: Endoscopy;  Laterality: N/A;    ESOPHAGOGASTRODUODENOSCOPY N/A 7/17/2019    Procedure: EGD (ESOPHAGOGASTRODUODENOSCOPY);  Surgeon: Blane Valdez MD;  Location: [facility]  (2ND FLR);  Service: Endoscopy;  Laterality: N/A;    ESOPHAGOGASTRODUODENOSCOPY N/A 7/18/2019    Procedure: EGD (ESOPHAGOGASTRODUODENOSCOPY);  Surgeon: Blane Valdez MD;  Location: [facility] ENDO (2ND FLR);  Service: Endoscopy;  Laterality: N/A;    FOREIGN BODY REMOVAL N/A 7/22/2022    Procedure: REMOVAL, FOREIGN BODY;  Surgeon: Yg Kaufman MD;  Location: [facility] OR [facility] FLR;  Service: Cardiovascular;  Laterality: N/A;  LVAD Heartmate 2 drive line removal    INSERTION OF GRAFT TO PERICARDIUM N/A 7/15/2022    Procedure: INSERTION, GRAFT, PERICARDIUM;  Surgeon: Yg Kaufman MD;  Location: [facility] OR [facility] FLR;  Service: Cardiovascular;  Laterality: N/A;    IRRIGATION OF MEDIASTINUM N/A 7/15/2022    Procedure: IRRIGATION, MEDIASTINUM;  Surgeon: Yg Kaufman MD;  [facility];  Service: Cardiovascular;  Laterality: N/A;    LYSIS OF ADHESIONS  7/13/2022    Procedure: LYSIS, ADHESIONS;  Surgeon: Yg Kaufman MD;  [facility];  Service: Cardiovascular;;    NONINVASIVE CARDIAC ELECTROPHYSIOLOGY STUDY N/A 10/18/2019    Procedure: CARDIAC ELECTROPHYSIOLOGY STUDY, NONINVASIVE;  Surgeon: Raz Wagner MD;  Location: [facility] EP LAB;  Service: Cardiology;  Laterality: N/A;  VT, DFTs, MDT CRTD in situ, LVAD, anes, MB, 3098    REPLACEMENT OF IMPLANTABLE CARDIOVERTER-DEFIBRILLATOR (ICD) GENERATOR N/A 3/9/2020    Procedure: REPLACEMENT, ICD GENERATOR;  Surgeon: Harry Yun MD;  Location: [facility] EP LAB;  Service: Cardiology;  Laterality: N/A;  VT, ICD Gen Change and Lead Revision, MDT, MAC, DM,3 Prep    REPLACEMENT OF LEFT VENTRICULAR ASSIST DEVICE (LVAD)  7/13/2022    Procedure: REPLACEMENT, LVAD;  Surgeon: Yg Kaufman MD;  Location: [facility] OR [facility] FLR;  Service: Cardiovascular;;    REPLACEMENT OF PUMP N/A 7/13/2022    Procedure: REPLACEMENT, PUMP;  Surgeon: Yg Kaufman MD;  [facility];  Service: Cardiovascular;  Laterality: N/A;  LVAD pump exchange  EXPLANATION OF HEATMATE 2  IMPLANTATION  OF HEARTMATE 3  IMPLANTATION OF 8MM CHIMNEY GRAFT TO RFA  INITIATION OF ECMO  TEMPORARY CLOSURE OF CHEST    REVISION OF IMPLANTABLE CARDIOVERTER-DEFIBRILLATOR (ICD) ELECTRODE LEAD PLACEMENT N/A 3/9/2020    Procedure: REVISION, INSERTION, ELECTRODE LEAD, ICD;  Surgeon: Harry Yun MD;  Location: [facility] EP LAB;  Service: Cardiology;  Laterality: N/A;  VT, ICD Gen Change and Lead Revision, MDT, MAC, DM,3 Prep    STERNAL WOUND CLOSURE N/A 2022    Procedure: CLOSURE, WOUND, STERNUM;  Surgeon: Yg Kaufman MD;  Location: [facility] OR [facility] FLR;  Service: Cardiovascular;  Laterality: N/A;  temporary closure  evacuation of hematoma    STERNAL WOUND CLOSURE N/A 7/15/2022    Procedure: CLOSURE, WOUND, STERNUM;  Surgeon: Yg Kaufman MD;  Location: [facility] OR [facility] FLR;  Service: Cardiovascular;  Laterality: N/A;    TRACHEOSTOMY N/A 2022    Procedure: CREATION, TRACHEOSTOMY;  Surgeon: Germain Holt MD;  [facility];  Service: General;  Laterality: N/A;    TREATMENT OF CARDIAC ARRHYTHMIA  10/18/2019    Procedure: Cardioversion or Defibrillation;  Surgeon: Raz Wagner MD;  Location: [facility] EP LAB;  Service: Cardiology;;     Family History       Problem Relation (Age of Onset)    Cancer Sister (54)    Coronary artery disease Father    Diabetes Father    Heart disease Father    Hypertension Father    No Known Problems Mother, Brother          Tobacco Use    Smoking status: Former Smoker     Packs/day: 1.00     Years: 31.00     Pack years: 31.00     Types: Cigarettes     Quit date: 2018     Years since quittin.6    Smokeless tobacco: Never Used    Tobacco comment: pt is quiting on his own - pt stated not qualified for program;  pt  quit on his own   Substance and Sexual Activity    Alcohol use: No     Alcohol/week: 0.0 standard drinks     Comment: quit    Drug use: No    Sexual activity: Yes     Partners: Female     Birth control/protection: None     Comment: 10/5/17  with same  partner 7 years      Review of Systems   Constitutional:  Positive for chills and fatigue. Negative for fever.   HENT:  Negative for ear discharge, ear pain, facial swelling, sinus pressure, sinus pain, tinnitus, trouble swallowing and voice change.    Eyes:  Positive for visual disturbance. Negative for photophobia.   Respiratory:  Positive for shortness of breath. Negative for apnea and stridor.    Cardiovascular:  Positive for palpitations.   Gastrointestinal:  Positive for nausea. Negative for vomiting.   Neurological:  Positive for dizziness.   Objective:     Vital Signs (Most Recent):  Temp: 97.5 °F (36.4 °C) (08/23/22 2328)  Pulse: 66 (08/24/22 1000)  Resp: 18 (08/24/22 0823)  BP: (!) 68/0 (08/24/22 0820)  SpO2: (!) 94 % (08/24/22 0823)   Vital Signs (24h Range):  Temp:  [97.5 °F (36.4 °C)-98.5 °F (36.9 °C)] 97.5 °F (36.4 °C)  Pulse:  [] 66  Resp:  [16-32] 18  SpO2:  [90 %-99 %] 94 %  BP: ()/(0) 68/0     Weight: 90.9 kg (200 lb 8 oz)  Body mass index is 26.45 kg/m².        Physical Exam  Vitals and nursing note reviewed.   Constitutional:       General: He is not in acute distress.     Appearance: Normal appearance. He is not ill-appearing.   HENT:      Head: Normocephalic and atraumatic.      Right Ear: Tympanic membrane, ear canal and external ear normal.      Left Ear: Tympanic membrane, ear canal and external ear normal.      Ears:      Abel exam findings: Lateralizes left.     Right Rinne: AC > BC.     Left Rinne: AC > BC.     Comments: [place]-Hallpike negative bilaterally  Head thrust negative bilaterally     Nose: Nose normal.      Mouth/Throat:      Mouth: Mucous membranes are moist.   Eyes:      General:         Right eye: No discharge.         Left eye: No discharge.      Extraocular Movements: Extraocular movements intact.      Pupils: Pupils are equal, round, and reactive to light.      Comments: Very mild left>right nystagmus on lateral gaze bilaterally.  No provoked diplopia.     Cardiovascular:      Rate and Rhythm: Normal rate.   Pulmonary:      Effort: Pulmonary effort is normal.      Breath sounds: No stridor.   Musculoskeletal:      Cervical back: No rigidity.   Lymphadenopathy:      Cervical: No cervical adenopathy.   Skin:     General: Skin is warm.   Neurological:      General: No focal deficit present.      Mental Status: He is alert and oriented to person, place, and time.      Cranial Nerves: No cranial nerve deficit.       Significant Labs:  BMP:   Recent Labs   Lab 08/24/22  0448 08/24/22  0856   GLU 66* 68*    99   CO2 28 28   BUN 18 17   CREATININE 1.1 1.1   CALCIUM 8.9 8.7   MG 2.2  --      CBC:   Recent Labs   Lab 08/24/22  0448   WBC 7.97   RBC 2.87*   HGB 7.9*   HCT 28.2*      MCV 98   MCH 27.5   MCHC 28.0*       Significant Diagnostics:  CT: I have reviewed all pertinent results/findings within the past 24 hours and my personal findings are:  no acute abnormality

## 2022-08-24 NOTE — PROGRESS NOTES
MD Jonelle, still at bedside. Patient stating his breathing feels like its back to normal.  Patient also stating he does not feel like he could tolerate lying flat right now for a head CT. Per MD Jonelle, ok to hold off on head CT until morning unless patient condition changes. Charge RN notified. [facility]

## 2022-08-24 NOTE — CONSULTS
[facility]  Otorhinolaryngology-Head & Neck Surgery  Consult Note    Patient Name: Tim Richards  MRN: [id number]  Code Status: Full Code  Admission Date: 6/27/2022  Hospital Length of Stay: 58 days  Attending Physician: Roslyn Ragsdale DO  Primary Care Provider: Deyanira Booth MD    Patient information was obtained from patient, past medical records and ER records.     Inpatient consult to ENT  Consult performed by: Yohan Canales MD  Consult ordered by: Nic Torres MD        Subjective:     Chief Complaint/Reason for Admission: dizziness    History of Present Illness: 56M currently hospitalized for LVAD complication who has had a prolonged hospital course including ECMO dependence, tracheostomy creation on 8/4 who has had 2-3 episodes of room-spinning vertigo in the last week. He states that these episodes are about 3 hours long and are generally self-limited. They are exacerbated by moving his head to the left or right, but are on the whole non-provoked. He does report that his face feels very flushed, he gets hot and his BP shoots up when these episodes happen. He does report some visual disturbances, primarily blurry vision and double vision which have resolved.  He denies any hearing changes or tinnitus when this happens. He denies aural fullness. He denies any history of ear surgery.  No episodes since last night. ENT has been consulted for evaluation.       Medications:  Continuous Infusions:   dextrose 10 % in water (D10W)      heparin (porcine) in D5W 19 Units/kg/hr (08/24/22 0601)     Scheduled Meds:   amiodarone  200 mg Oral Daily    aspirin  81 mg Oral Daily    levalbuterol  0.63 mg Nebulization Q6H    magnesium oxide  400 mg Oral BID    mexiletine  400 mg Oral Q8H    mirtazapine  15 mg Oral QHS    ondansetron  4 mg Intravenous Once    pantoprazole  40 mg Oral Daily    polyethylene glycol  17 g Oral Daily    predniSONE  20 mg Oral Daily    senna-docusate  8.6-50 mg  1 tablet Oral Daily    warfarin  6 mg Oral Daily     PRN Meds:sodium chloride 0.9%, acetaminophen, ALPRAZolam, barium sulfate, barium sulfate, bisacodyL, dextrose 10 % in water (D10W), dextrose 10%, dextrose 10%, glucagon (human recombinant), glucose, guaiFENesin 100 mg/5 ml, HYDROmorphone, HYDROmorphone, hydrOXYzine HCL, meclizine, melatonin, ondansetron     No current facility-administered medications on file prior to encounter.     Current Outpatient Medications on File Prior to Encounter   Medication Sig    allopurinoL (ZYLOPRIM) 100 MG tablet TAKE 1 TABLET (100 MG) BY MOUTH NIGHTLY.    amiodarone (PACERONE) 200 MG Tab Take 2 tablets (400 mg total) by mouth once daily.    aspirin (ECOTRIN) 81 MG EC tablet Take 1 tablet (81 mg total) by mouth once daily.    busPIRone (BUSPAR) 5 MG Tab Take 1 tablet (5 mg total) by mouth 3 (three) times daily.    levothyroxine (SYNTHROID) 112 MCG tablet Take 1 tablet (112 mcg total) by mouth before breakfast.    mexiletine (MEXITIL) 250 MG Cap Take 1 capsule (250 mg total) by mouth every 8 (eight) hours.    pantoprazole (PROTONIX) 40 MG tablet TAKE 1 TABLET (40 MG TOTAL) BY MOUTH DAILY WITH LUNCH.    warfarin (COUMADIN) 5 MG tablet TAKE 7.5 MG ORALLY DAILY EVERY SUNDAY AND THURSDAY, TAKE 5 MG ORALLY DAILY ON OTHER DAYS    [DISCONTINUED] ferrous gluconate (FERGON) 324 MG tablet TAKE 1 TABLET (324 MG TOTAL) BY MOUTH WITH LUNCH.    [DISCONTINUED] sildenafiL (VIAGRA) 100 MG tablet Take 1 tablet (100 mg total) by mouth daily as needed for Erectile Dysfunction.       Review of patient's allergies indicates:   Allergen Reactions    Lisinopril Anaphylaxis    Hydralazine analogues      Chronic constipation, impotence, dizziness       Past Medical History:   Diagnosis Date    Anticoagulant long-term use     CHF (congestive heart failure)     Class 1 obesity due to excess calories with serious comorbidity and body mass index (BMI) of 31.0 to 31.9 in adult      Dilated cardiomyopathy 1/10/2018    Disorder of kidney and ureter     CKD    Encounter for blood transfusion     Gout     HTN (hypertension)     Hx of psychiatric care     ICD (implantable cardioverter-defibrillator) infection 7/1/2020    Psychiatric problem     Thyroid disease     Ventricular tachycardia (paroxysmal)      Past Surgical History:   Procedure Laterality Date    AORTIC VALVULOPLASTY N/A 7/13/2022    Procedure: REPAIR, AORTIC VALVE;  Surgeon: Yg Kaufman MD;  Location: [facility] OR 2ND FLR;  Service: Cardiovascular;  Laterality: N/A;    APPLICATION OF WOUND VACUUM-ASSISTED CLOSURE DEVICE N/A 7/15/2022    Procedure: APPLICATION, WOUND VAC;  Surgeon: Yg Kaufman MD;  Location: [facility] OR 2ND FLR;  Service: Cardiovascular;  Laterality: N/A;  50 x 5 cm     CARDIAC CATHETERIZATION  Dec. 2012    CARDIAC DEFIBRILLATOR PLACEMENT Left     CRRT-D    COLONOSCOPY N/A 3/6/2018    Procedure: COLONOSCOPY;  Surgeon: Alonso Bone MD;  Location: [facility] ENDO (2ND FLR);  Service: Endoscopy;  Laterality: N/A;    COLONOSCOPY N/A 7/17/2019    Procedure: COLONOSCOPY;  Surgeon: Blane Valdez MD;  Location: [facility] ENDO (2ND FLR);  Service: Endoscopy;  Laterality: N/A;    COLONOSCOPY N/A 7/18/2019    Procedure: COLONOSCOPY;  Surgeon: Blane Valdez MD;  Location: [facility] ENDO (2ND FLR);  Service: Endoscopy;  Laterality: N/A;    ESOPHAGOGASTRODUODENOSCOPY N/A 7/17/2019    Procedure: EGD (ESOPHAGOGASTRODUODENOSCOPY);  Surgeon: Blane Valdez MD;  Location: [facility] ENDO (2ND FLR);  Service: Endoscopy;  Laterality: N/A;    ESOPHAGOGASTRODUODENOSCOPY N/A 7/18/2019    Procedure: EGD (ESOPHAGOGASTRODUODENOSCOPY);  Surgeon: Blane Valdez MD;  Location: [facility] ENDO (2ND FLR);  Service: Endoscopy;  Laterality: N/A;    FOREIGN BODY REMOVAL N/A 7/22/2022    Procedure: REMOVAL, FOREIGN BODY;  Surgeon: Yg Kaufman MD;  Location: [facility] OR 2ND FLR;  Service: Cardiovascular;  Laterality: N/A;  LVAD Heartmate 2 drive line removal    INSERTION OF GRAFT  TO PERICARDIUM N/A 7/15/2022    Procedure: INSERTION, GRAFT, PERICARDIUM;  Surgeon: Yg Kaufman MD;  Location: [facility] OR [facility] FLR;  Service: Cardiovascular;  Laterality: N/A;    IRRIGATION OF MEDIASTINUM N/A 7/15/2022    Procedure: IRRIGATION, MEDIASTINUM;  Surgeon: Yg Kaufman MD;  Location: [facility] OR [facility] FLR;  Service: Cardiovascular;  Laterality: N/A;    LYSIS OF ADHESIONS  7/13/2022    Procedure: LYSIS, ADHESIONS;  Surgeon: Yg Kaufman MD;  [facility];  Service: Cardiovascular;;    NONINVASIVE CARDIAC ELECTROPHYSIOLOGY STUDY N/A 10/18/2019    Procedure: CARDIAC ELECTROPHYSIOLOGY STUDY, NONINVASIVE;  Surgeon: Raz Wagner MD;  Location: [facility] EP LAB;  Service: Cardiology;  Laterality: N/A;  VT, DFTs, MDT CRTD in situ, LVAD, anes, MB, 3098    REPLACEMENT OF IMPLANTABLE CARDIOVERTER-DEFIBRILLATOR (ICD) GENERATOR N/A 3/9/2020    Procedure: REPLACEMENT, ICD GENERATOR;  Surgeon: Harry Yun MD;  Location: [facility] EP LAB;  Service: Cardiology;  Laterality: N/A;  VT, ICD Gen Change and Lead Revision, MDT, MAC, DM,3 Prep    REPLACEMENT OF LEFT VENTRICULAR ASSIST DEVICE (LVAD)  7/13/2022    Procedure: REPLACEMENT, LVAD;  Surgeon: Yg Kaufman MD;  [facility];  Service: Cardiovascular;;    REPLACEMENT OF PUMP N/A 7/13/2022    Procedure: REPLACEMENT, PUMP;  Surgeon: Yg Kaufman MD;  [facility];  Service: Cardiovascular;  Laterality: N/A;  LVAD pump exchange  EXPLANATION OF HEATMATE 2  IMPLANTATION OF HEARTMATE 3  IMPLANTATION OF 8MM CHIMNEY GRAFT TO RFA  INITIATION OF ECMO  TEMPORARY CLOSURE OF CHEST    REVISION OF IMPLANTABLE CARDIOVERTER-DEFIBRILLATOR (ICD) ELECTRODE LEAD PLACEMENT N/A 3/9/2020    Procedure: REVISION, INSERTION, ELECTRODE LEAD, ICD;  Surgeon: Harry Yun MD;  Location: [facility] EP LAB;  Service: Cardiology;  Laterality: N/A;  VT, ICD Gen Change and Lead Revision, MDT, MAC, DM,3 Prep    STERNAL WOUND CLOSURE N/A 7/14/2022    Procedure:  CLOSURE, WOUND, STERNUM;  Surgeon: Yg Kaufman MD;  [facility];  Service: Cardiovascular;  Laterality: N/A;  temporary closure  evacuation of hematoma    STERNAL WOUND CLOSURE N/A 7/15/2022    Procedure: CLOSURE, WOUND, STERNUM;  Surgeon: Yg Kaufman MD;  [facility];  Service: Cardiovascular;  Laterality: N/A;    TRACHEOSTOMY N/A 2022    Procedure: CREATION, TRACHEOSTOMY;  Surgeon: Germain Holt MD;  [facility];  Service: General;  Laterality: N/A;    TREATMENT OF CARDIAC ARRHYTHMIA  10/18/2019    Procedure: Cardioversion or Defibrillation;  Surgeon: Raz Wagner MD;  Location: [facility] EP LAB;  Service: Cardiology;;     Family History       Problem Relation (Age of Onset)    Cancer Sister (54)    Coronary artery disease Father    Diabetes Father    Heart disease Father    Hypertension Father    No Known Problems Mother, Brother          Tobacco Use    Smoking status: Former Smoker     Packs/day: 1.00     Years: 31.00     Pack years: 31.00     Types: Cigarettes     Quit date: 2018     Years since quittin.6    Smokeless tobacco: Never Used    Tobacco comment: pt is quiting on his own - pt stated not qualified for program;  pt  quit on his own   Substance and Sexual Activity    Alcohol use: No     Alcohol/week: 0.0 standard drinks     Comment: quit    Drug use: No    Sexual activity: Yes     Partners: Female     Birth control/protection: None     Comment: 10/5/17  with same partner 7 years      Review of Systems   Constitutional:  Positive for chills and fatigue. Negative for fever.   HENT:  Negative for ear discharge, ear pain, facial swelling, sinus pressure, sinus pain, tinnitus, trouble swallowing and voice change.    Eyes:  Positive for visual disturbance. Negative for photophobia.   Respiratory:  Positive for shortness of breath. Negative for apnea and stridor.    Cardiovascular:  Positive for palpitations.    Gastrointestinal:  Positive for nausea. Negative for vomiting.   Neurological:  Positive for dizziness.   Objective:     Vital Signs (Most Recent):  Temp: 97.5 °F (36.4 °C) (08/23/22 2328)  Pulse: 66 (08/24/22 1000)  Resp: 18 (08/24/22 0823)  BP: (!) 68/0 (08/24/22 0820)  SpO2: (!) 94 % (08/24/22 0823)   Vital Signs (24h Range):  Temp:  [97.5 °F (36.4 °C)-98.5 °F (36.9 °C)] 97.5 °F (36.4 °C)  Pulse:  [] 66  Resp:  [16-32] 18  SpO2:  [90 %-99 %] 94 %  BP: ()/(0) 68/0     Weight: 90.9 kg (200 lb 8 oz)  Body mass index is 26.45 kg/m².        Physical Exam  Vitals and nursing note reviewed.   Constitutional:       General: He is not in acute distress.     Appearance: Normal appearance. He is not ill-appearing.   HENT:      Head: Normocephalic and atraumatic.      Right Ear: Tympanic membrane, ear canal and external ear normal.      Left Ear: Tympanic membrane, ear canal and external ear normal.      Ears:      Abel exam findings: Lateralizes left.     Right Rinne: AC > BC.     Left Rinne: AC > BC.     Comments: [place]-Hallpike negative bilaterally  Head thrust negative bilaterally     Nose: Nose normal.      Mouth/Throat:      Mouth: Mucous membranes are moist.   Eyes:      General:         Right eye: No discharge.         Left eye: No discharge.      Extraocular Movements: Extraocular movements intact.      Pupils: Pupils are equal, round, and reactive to light.      Comments: Very mild left>right nystagmus on lateral gaze bilaterally.  No provoked diplopia.    Cardiovascular:      Rate and Rhythm: Normal rate.   Pulmonary:      Effort: Pulmonary effort is normal.      Breath sounds: No stridor.   Musculoskeletal:      Cervical back: No rigidity.   Lymphadenopathy:      Cervical: No cervical adenopathy.   Skin:     General: Skin is warm.   Neurological:      General: No focal deficit present.      Mental Status: He is alert and oriented to person, place, and time.      Cranial Nerves: No cranial nerve  deficit.       Significant Labs:  BMP:   Recent Labs   Lab 08/24/22  0448 08/24/22  0856   GLU 66* 68*    99   CO2 28 28   BUN 18 17   CREATININE 1.1 1.1   CALCIUM 8.9 8.7   MG 2.2  --      CBC:   Recent Labs   Lab 08/24/22  0448   WBC 7.97   RBC 2.87*   HGB 7.9*   HCT 28.2*      MCV 98   MCH 27.5   MCHC 28.0*       Significant Diagnostics:  CT: I have reviewed all pertinent results/findings within the past 24 hours and my personal findings are:  no acute abnormality      Assessment/Plan:     Dizziness  56M with dizziness/vertigo of uncertain etiology. Otologic exam normal. History is not revealing for central versus peripheral etiology of vertigo.     No acute ENT surgical intervention indicated.   Can consider MRI brain to rule out central etiology if clinically indicated  Can continue meclizine prn vertigo, would not advise scheduling this medicine as it can cause dysequilibrium if taken for long periods of time.           VTE Risk Mitigation (From admission, onward)         Ordered     warfarin (COUMADIN) tablet 6 mg  Daily         08/22/22 1023     Place knee high DURAN hose on both legs  Until discontinued         08/18/22 1542     heparin 25,000 units in dextrose 5% 250 ml (100 units/mL) infusion MINIMAL INTENSITY nomogram - [facility]  Continuous        Question Answer Comment   Heparin Infusion Adjustment (DO NOT MODIFY ANSWER) [email or web address]\Images\[email or web address]\[email or web address]\heparin MINIMAL  INTENSITY nomogram for [facility] [id number]    Begin at (in units/kg/hr) 12        08/10/22 1350     IP VTE HIGH RISK PATIENT  Once         07/21/22 1652     Place sequential compression device  Until discontinued         07/13/22 2040                Thank you for your consult. I will sign off. Please contact us if you have any additional questions.    Yohan Canales MD  Otorhinolaryngology-Head & Neck Surgery  [facility]

## 2022-08-24 NOTE — PT/OT/SLP PROGRESS
Occupational Therapy   Co-Treatment    Name: Tim Richards  MRN: [id number]  Admitting Diagnosis:  Left ventricular assist device (LVAD) complication  20 Days Post-Op    Recommendations:     Discharge Recommendations: rehabilitation facility  Discharge Equipment Recommendations:   (TBD at next level of care)  Barriers to discharge:  Inaccessible home environment, Decreased caregiver support    Assessment:     Tim Richards is a 56 y.o. male with a medical diagnosis of Left ventricular assist device (LVAD) complication.  He presents with new onset of diplopia during session associated with dizziness (resolved rather quickly), unequal pupils and L nystagmus with pursuits. Performance deficits affecting function are weakness, impaired endurance, gait instability, impaired balance, impaired cardiopulmonary response to activity, impaired functional mobility, impaired self care skills. Pt has impaired activity response and benefits from co-treatment with PT to maximize pt participation and progression with therapy.     Rehab Prognosis:  Good; patient would benefit from acute skilled OT services to address these deficits and reach maximum level of function.       Plan:     Patient to be seen 5 x/week to address the above listed problems via self-care/home management, therapeutic activities, therapeutic exercises, neuromuscular re-education  · Plan of Care Expires: 08/25/22  · Plan of Care Reviewed with: patient    Subjective     Pain/Comfort:  · Pain Rating 1: 0/10  · Pain Rating Post-Intervention 1: 0/10    Objective:     Communicated with: RN prior to session.  Patient found supine with wound vac, Tracheostomy, telemetry, oxygen, LVAD, peripheral IV upon OT entry to room.    General Precautions: Standard, fall, LVAD   Orthopedic Precautions:N/A   Braces:    Respiratory Status: Trach collar     Occupational Performance:     Bed Mobility:    · Patient completed Supine to Sit with stand by assistance  · Patient  completed Sit to Supine with stand by assistance     Functional Mobility/Transfers & Activities of Daily Living:  Deferred    [facility] [address] [facility] 6 Click ADL: 18    Treatment & Education:  Pt ed on OT POC  Pt sat EOB with SBA and encouraged to focus on stationary spot to help alleviate dizziness  Pt endorsed new onset of double vision that persisted while EOB x ~6 min  Pt ed on concern for new onset of symptoms and contraindication for continued EOB sitting  RN notified    Patient left supine with all lines intact, call button in reach and RN notifiedEducation:      GOALS:   Multidisciplinary Problems     Occupational Therapy Goals        Problem: Occupational Therapy    Goal Priority Disciplines Outcome Interventions   Occupational Therapy Goal     OT, PT/OT Ongoing, Progressing    Description: Goals to be met by: 8/9/22     Patient will increase functional independence with ADLs by performing:    UE Dressing with Stand-by Assistance.  LE Dressing with Stand-by Assistance.  Grooming while standing at sink with Stand-by Assistance.  Toileting from toilet with Stand-by Assistance for hygiene and clothing management.   Toilet transfer to toilet with Stand-by Assistance.               Multidisciplinary Problems (Resolved)        Problem: Occupational Therapy    Goal Priority Disciplines Outcome Interventions   Occupational Therapy Goal   (Resolved)     OT, PT/OT Met           Problem: Occupational Therapy    Goal Priority Disciplines Outcome Interventions   Occupational Therapy Goal   (Resolved)     OT, PT/OT Met                    Time Tracking:     OT Date of Treatment: 08/24/22  OT Start Time: 0910  OT Stop Time: 0922  OT Total Time (min): 12 min    Billable Minutes:Therapeutic Activity 12               8/24/2022

## 2022-08-24 NOTE — SUBJECTIVE & OBJECTIVE
Interval History: Overnight had an acute episode of shortness of breath as well as vertigo.  On call physician evaluated patient and he was found to be clinically volume overloaded. Stat CT head, CXR and ABG were obtained, 20mg IV lasix administered with good urine output.  Patient has been comfortable ever since.  He had a large bowel movement yesterday from which he reports relief.  Denies any recurrent episodes of vertigo but states he has them about 2-3 times a day.      Hemodynamics  Heartmate3 interrogated, no power spikes, low flow alarms, or PI events overnight.  Flow 5.6Lpm, 6300 rpm, 2.2 PI, power 3.3    Continuous Infusions:   dextrose 10 % in water (D10W)      heparin (porcine) in D5W 19 Units/kg/hr (08/24/22 0601)     Scheduled Meds:   amiodarone  200 mg Oral Daily    aspirin  81 mg Oral Daily    furosemide  20 mg Oral Daily    levalbuterol  0.63 mg Nebulization Q6H    magnesium oxide  400 mg Oral BID    mexiletine  400 mg Oral Q8H    mirtazapine  15 mg Oral QHS    ondansetron  4 mg Intravenous Once    pantoprazole  40 mg Oral Daily    polyethylene glycol  17 g Oral Daily    predniSONE  20 mg Oral Daily    senna-docusate 8.6-50 mg  1 tablet Oral Daily    warfarin  6 mg Oral Daily     PRN Meds:sodium chloride 0.9%, acetaminophen, ALPRAZolam, barium sulfate, barium sulfate, bisacodyL, dextrose 10 % in water (D10W), dextrose 10%, dextrose 10%, glucagon (human recombinant), glucose, guaiFENesin 100 mg/5 ml, HYDROmorphone, HYDROmorphone, hydrOXYzine HCL, meclizine, melatonin, ondansetron    Review of patient's allergies indicates:   Allergen Reactions    Lisinopril Anaphylaxis    Hydralazine analogues      Chronic constipation, impotence, dizziness     Objective:     Vital Signs (Most Recent):  Temp: 98.4 °F (36.9 °C) (08/24/22 1143)  Pulse: 78 (08/24/22 1335)  Resp: 20 (08/24/22 1335)  BP: (!) 74/0 (08/24/22 1133)  SpO2: 96 % (08/24/22 1335)   Vital Signs (24h Range):  Temp:  [97.5 °F (36.4 °C)-98.5 °F  (36.9 °C)] 98.4 °F (36.9 °C)  Pulse:  [] 78  Resp:  [14-32] 20  SpO2:  [94 %-99 %] 96 %  BP: ()/(0) 74/0     Patient Vitals for the past 72 hrs (Last 3 readings):   Weight   08/24/22 0400 90.9 kg (200 lb 8 oz)   08/23/22 0400 93.6 kg (206 lb 4.8 oz)     Body mass index is 26.45 kg/m².      Intake/Output Summary (Last 24 hours) at 8/24/2022 1521  Last data filed at 8/24/2022 1300  Gross per 24 hour   Intake 2132.4 ml   Output 3400 ml   Net -1267.6 ml       Hemodynamic Parameters:       Telemetry: No acute events overnight, no arrhythmic events recorded on telemetry    Physical Exam  Vitals and nursing note reviewed.   Constitutional:       General: He is not in acute distress.     Appearance: Normal appearance. He is not ill-appearing.   HENT:      Head: Normocephalic and atraumatic.      Ears:      Abel exam findings: Lateralizes left.     Right Rinne: AC > BC.     Left Rinne: AC > BC.     Nose: Nose normal.      Mouth/Throat:      Mouth: Mucous membranes are moist.      Comments: Tracheostomy site is clean and dry without drainage  Eyes:      Extraocular Movements: Extraocular movements intact.      Conjunctiva/sclera: Conjunctivae normal.      Pupils: Pupils are equal, round, and reactive to light.   Cardiovascular:      Rate and Rhythm: Normal rate.      Pulses: Normal pulses.      Heart sounds: Normal heart sounds.      Comments: VAD hum can be heard  Pulmonary:      Effort: Pulmonary effort is normal.      Breath sounds: Normal breath sounds. No stridor.   Abdominal:      General: Abdomen is flat. There is no distension.      Palpations: Abdomen is soft.      Tenderness: There is no abdominal tenderness. There is no guarding.      Comments: Hypoactive bowel sounds   Musculoskeletal:         General: Normal range of motion.      Cervical back: No rigidity.   Lymphadenopathy:      Cervical: No cervical adenopathy.   Skin:     General: Skin is warm and dry.      Capillary Refill: Capillary refill  takes less than 2 seconds.   Neurological:      General: No focal deficit present.      Mental Status: He is alert and oriented to person, place, and time.      Cranial Nerves: No cranial nerve deficit.       Significant Labs:  CBC:  Recent Labs   Lab 08/22/22  0510 08/23/22 0434 08/24/22 0448   WBC 12.27 9.77 7.97   RBC 2.75* 2.66* 2.87*   HGB 7.8* 7.5* 7.9*   HCT 27.3* 25.4* 28.2*   * 135* 168   MCV 99* 96 98   MCH 28.4 28.2 27.5   MCHC 28.6* 29.5* 28.0*     BNP:  Recent Labs   Lab 08/19/22  0523 08/22/22  0510 08/24/22 0448   *  482* 886* 568*     CMP:  Recent Labs   Lab 08/21/22 0444 08/22/22  0510 08/23/22 0434 08/24/22 0448 08/24/22  0856   GLU 67* 69* 66* 66* 68*   CALCIUM 8.9 8.9 8.7 8.9 8.7   ALBUMIN 2.1* 2.1*  --   --  2.2*   PROT 5.8* 5.9*  --   --  5.9*   * 130* 129* 133* 132*   K 4.7 4.7 4.8 4.9 4.5   CO2 24 26 26 28 28    100 99 100 99   BUN 24* 21* 22* 18 17   CREATININE 1.0 1.1 1.2 1.1 1.1   ALKPHOS 208* 182*  --   --  166*   ALT 58* 58*  --   --  56*   AST 37 34  --   --  32   BILITOT 1.7* 1.6*  --   --  1.3*      Coagulation:   Recent Labs   Lab 08/22/22  0510 08/22/22  1304 08/22/22 2103 08/23/22 0434 08/24/22 0448   INR 1.0  --   --  1.1 1.2   APTT 56.1*   < > 42.6* 43.0* 42.0*    < > = values in this interval not displayed.     LDH:  Recent Labs   Lab 08/22/22  0510 08/23/22 0434 08/24/22  0448   * 260 298*     Microbiology:  Microbiology Results (last 7 days)       ** No results found for the last 168 hours. **            I have reviewed all pertinent labs within the past 24 hours.    Estimated Creatinine Clearance: 85.8 mL/min (based on SCr of 1.1 mg/dL).    Diagnostic Results:  I have reviewed all pertinent imaging results/findings within the past 24 hours.

## 2022-08-24 NOTE — CARE UPDATE
Nystagmas/vertigo on exam with tachypnea and Dopppler 100/0. Per my exam, new sluggish left pupillary constriction and crackles. ABG 7.36/47/62 on trach 40% and 8L Fi02.     Suspect elevated dopplers secondary to vertigo symptoms with pulmonary edema.     Plan to stabilize respiratory status with IV lasix 20mg X1, increased Fi02 60% and 12L then send for stat CT head.    Epley maneuver performed with slight improvement in symptoms, has meclizine ordered.    Recommend day team to discuss with surgery/ENT downsize trach and ensure speaking valve is not worn at night as he has had similar occurances with respiratory distress in past.     Above plan communicated with on call staff.     Jonelle Lu, PGY6  Cardiovascular Disease  [facility]

## 2022-08-24 NOTE — SUBJECTIVE & OBJECTIVE
"Interval HPI:   Overnight events: Doing well this AM.  Low glucose this AM again  Eatin%  Nausea: No  Hypoglycemia and intervention: Yes  Fever: No  TPN and/or TF: No  If yes, type of TF/TPN and rate: N/A    BP (!) 68/0 (BP Location: Left arm, Patient Position: Lying)   Pulse 74   Temp 98.5 °F (36.9 °C)   Resp 16   Ht 6' 1" (1.854 m)   Wt 93.6 kg (206 lb 4.8 oz)   SpO2 96%   BMI 27.22 kg/m²     Labs Reviewed and Include    Recent Labs   Lab 22  0434   GLU 66*   CALCIUM 8.7   *   K 4.8   CO2 26   CL 99   BUN 22*   CREATININE 1.2     Lab Results   Component Value Date    WBC 9.77 2022    HGB 7.5 (L) 2022    HCT 25.4 (L) 2022    MCV 96 2022     (L) 2022     Recent Labs   Lab 22  0523 22  0444   TSH  --  3.940   FREET4 0.98 0.93     Lab Results   Component Value Date    HGBA1C 5.4 2021       Nutritional status:   Body mass index is 27.22 kg/m².  Lab Results   Component Value Date    ALBUMIN 2.1 (L) 2022    ALBUMIN 2.1 (L) 2022    ALBUMIN 2.2 (L) 2022     Lab Results   Component Value Date    PREALBUMIN 23 2022    PREALBUMIN 30 2022    PREALBUMIN 32 2022       Estimated Creatinine Clearance: 78.6 mL/min (based on SCr of 1.2 mg/dL).    Accu-Checks  Recent Labs     22  1234 22  1521 22  1112   POCTGLUCOSE 81 69* 137* 103       Current Medications and/or Treatments Impacting Glycemic Control  Immunotherapy:    Immunosuppressants       None          Steroids:   Hormones (From admission, onward)                Start     Stop Route Frequency Ordered    22 1645  melatonin tablet 6 mg         -- Oral Nightly PRN 22 1646    22 0900  predniSONE tablet 20 mg         -- Oral Daily 22 1620          Pressors:    Autonomic Drugs (From admission, onward)                Start     Stop Route Frequency Ordered    22 0011  EPINEPHrine (ADRENALIN) 1 mg/mL injection "        Note to Pharmacy: Created by cabinet override    07/29 1214   07/29/22 0011          Hyperglycemia/Diabetes Medications:   Antihyperglycemics (From admission, onward)                None

## 2022-08-24 NOTE — PLAN OF CARE
Plan of care discussed with patient. Patient Aox4 and VS stable. Patient free of falls and trauma, fall precautions in place. LVAD DP and numbers WNL, smooth LVAD hum. Patient has no complaints of pain. Heparin gtt continuing. Patient had moment of dizziness and double vision in AM, MD notified and no new orders given. No new episodes since. Wound vac dressing change completed by wound care nurse. Sternal incision and RLQ site wound care completed per order. LVAD dressing change completed. Discussed medications and care. Patient has no questions at this time. Will continue to monitor.

## 2022-08-24 NOTE — PROGRESS NOTES
[facility] - Cardiology Step[facility]  Heart Transplant  Progress Note    Patient Name: Tim Richards  MRN: [id number]  Admission Date: 6/27/2022  Hospital Length of Stay: 58 days  Attending Physician: Roslyn Ragsdale DO  Primary Care Provider: Deyanira Booth MD  Principal Problem:Left ventricular assist device (LVAD) complication    Subjective:     Interval History: Overnight had an acute episode of shortness of breath as well as vertigo.  On call physician evaluated patient and he was found to be clinically volume overloaded. Stat CT head, CXR and ABG were obtained, 20mg IV lasix administered with good urine output.  Patient has been comfortable ever since.  He had a large bowel movement yesterday from which he reports relief.  Denies any recurrent episodes of vertigo but states he has them about 2-3 times a day.      Hemodynamics  Heartmate3 interrogated, no power spikes, low flow alarms, or PI events overnight.  Flow 5.6Lpm, 6300 rpm, 2.2 PI, power 3.3    Continuous Infusions:   dextrose 10 % in water (D10W)      heparin (porcine) in D5W 19 Units/kg/hr (08/24/22 0601)     Scheduled Meds:   amiodarone  200 mg Oral Daily    aspirin  81 mg Oral Daily    furosemide  20 mg Oral Daily    levalbuterol  0.63 mg Nebulization Q6H    magnesium oxide  400 mg Oral BID    mexiletine  400 mg Oral Q8H    mirtazapine  15 mg Oral QHS    ondansetron  4 mg Intravenous Once    pantoprazole  40 mg Oral Daily    polyethylene glycol  17 g Oral Daily    predniSONE  20 mg Oral Daily    senna-docusate 8.6-50 mg  1 tablet Oral Daily    warfarin  6 mg Oral Daily     PRN Meds:sodium chloride 0.9%, acetaminophen, ALPRAZolam, barium sulfate, barium sulfate, bisacodyL, dextrose 10 % in water (D10W), dextrose 10%, dextrose 10%, glucagon (human recombinant), glucose, guaiFENesin 100 mg/5 ml, HYDROmorphone, HYDROmorphone, hydrOXYzine HCL, meclizine, melatonin, ondansetron    Review of patient's allergies indicates:   Allergen  Reactions    Lisinopril Anaphylaxis    Hydralazine analogues      Chronic constipation, impotence, dizziness     Objective:     Vital Signs (Most Recent):  Temp: 98.4 °F (36.9 °C) (08/24/22 1143)  Pulse: 78 (08/24/22 1335)  Resp: 20 (08/24/22 1335)  BP: (!) 74/0 (08/24/22 1133)  SpO2: 96 % (08/24/22 1335)   Vital Signs (24h Range):  Temp:  [97.5 °F (36.4 °C)-98.5 °F (36.9 °C)] 98.4 °F (36.9 °C)  Pulse:  [] 78  Resp:  [14-32] 20  SpO2:  [94 %-99 %] 96 %  BP: ()/(0) 74/0     Patient Vitals for the past 72 hrs (Last 3 readings):   Weight   08/24/22 0400 90.9 kg (200 lb 8 oz)   08/23/22 0400 93.6 kg (206 lb 4.8 oz)     Body mass index is 26.45 kg/m².      Intake/Output Summary (Last 24 hours) at 8/24/2022 1521  Last data filed at 8/24/2022 1300  Gross per 24 hour   Intake 2132.4 ml   Output 3400 ml   Net -1267.6 ml       Hemodynamic Parameters:       Telemetry: No acute events overnight, no arrhythmic events recorded on telemetry    Physical Exam  Vitals and nursing note reviewed.   Constitutional:       General: He is not in acute distress.     Appearance: Normal appearance. He is not ill-appearing.   HENT:      Head: Normocephalic and atraumatic.      Ears:      Abel exam findings: Lateralizes left.     Right Rinne: AC > BC.     Left Rinne: AC > BC.     Nose: Nose normal.      Mouth/Throat:      Mouth: Mucous membranes are moist.      Comments: Tracheostomy site is clean and dry without drainage  Eyes:      Extraocular Movements: Extraocular movements intact.      Conjunctiva/sclera: Conjunctivae normal.      Pupils: Pupils are equal, round, and reactive to light.   Cardiovascular:      Rate and Rhythm: Normal rate.      Pulses: Normal pulses.      Heart sounds: Normal heart sounds.      Comments: VAD hum can be heard  Pulmonary:      Effort: Pulmonary effort is normal.      Breath sounds: Normal breath sounds. No stridor.   Abdominal:      General: Abdomen is flat. There is no distension.       Palpations: Abdomen is soft.      Tenderness: There is no abdominal tenderness. There is no guarding.      Comments: Hypoactive bowel sounds   Musculoskeletal:         General: Normal range of motion.      Cervical back: No rigidity.   Lymphadenopathy:      Cervical: No cervical adenopathy.   Skin:     General: Skin is warm and dry.      Capillary Refill: Capillary refill takes less than 2 seconds.   Neurological:      General: No focal deficit present.      Mental Status: He is alert and oriented to person, place, and time.      Cranial Nerves: No cranial nerve deficit.       Significant Labs:  CBC:  Recent Labs   Lab 08/22/22  0510 08/23/22  0434 08/24/22 0448   WBC 12.27 9.77 7.97   RBC 2.75* 2.66* 2.87*   HGB 7.8* 7.5* 7.9*   HCT 27.3* 25.4* 28.2*   * 135* 168   MCV 99* 96 98   MCH 28.4 28.2 27.5   MCHC 28.6* 29.5* 28.0*     BNP:  Recent Labs   Lab 08/19/22 0523 08/22/22  0510 08/24/22 0448   *  482* 886* 568*     CMP:  Recent Labs   Lab 08/21/22  0444 08/22/22  0510 08/23/22  0434 08/24/22 0448 08/24/22  0856   GLU 67* 69* 66* 66* 68*   CALCIUM 8.9 8.9 8.7 8.9 8.7   ALBUMIN 2.1* 2.1*  --   --  2.2*   PROT 5.8* 5.9*  --   --  5.9*   * 130* 129* 133* 132*   K 4.7 4.7 4.8 4.9 4.5   CO2 24 26 26 28 28    100 99 100 99   BUN 24* 21* 22* 18 17   CREATININE 1.0 1.1 1.2 1.1 1.1   ALKPHOS 208* 182*  --   --  166*   ALT 58* 58*  --   --  56*   AST 37 34  --   --  32   BILITOT 1.7* 1.6*  --   --  1.3*      Coagulation:   Recent Labs   Lab 08/22/22  0510 08/22/22  1304 08/22/22 2103 08/23/22 0434 08/24/22  0448   INR 1.0  --   --  1.1 1.2   APTT 56.1*   < > 42.6* 43.0* 42.0*    < > = values in this interval not displayed.     LDH:  Recent Labs   Lab 08/22/22  0510 08/23/22  0434 08/24/22  0448   * 260 298*     Microbiology:  Microbiology Results (last 7 days)       ** No results found for the last 168 hours. **            I have reviewed all pertinent labs within the past 24  hours.    Estimated Creatinine Clearance: 85.8 mL/min (based on SCr of 1.1 mg/dL).    Diagnostic Results:  I have reviewed all pertinent imaging results/findings within the past 24 hours.    Assessment and Plan:     57 Y/O M with Hx of stage D HFrEF (EF 10%) due to NICM underwent HM2 implant 3/8/18 and exchange of outflow graft 3/9/18, Hx VT/VF s/p SICD 2014 presenting with gradual worsening shortness of breath associated with nonpleuritic, nonradiating bilateral 4/10 retrosternal chest pressure pain.  Symptoms began yesterday and have gradually worsened in the last 12 hours.  He does report going to a family picnic with increased consumption of chicken wings, ribs and other salty meat products.  Prior to symptom onset, he reports nausea, nonbloody diarrhea began yesterday and single episode of nonbloody nonbilious vomiting this morning. He also complains of dizziness, lightheadedness, and a mild HA. SOB worsened this morning prompting him to come to the ED.     In the ED, patient was in respiratory distress requiring BiPAP. MAP 96 and started on Nitro gtt. Work-up revealed WBC 10, K 5.4, Cr 2.5 (baseline 1.9), BNP 1950 (baseline 200-300s), Bili 2.1, LDH 1058, and INR 3.2. Bedside TTE with severely reduced EF, AV not opening, septum bulging into RV. Given IV Lasix 80 mg x1 with only 100-200 mL UOP and dark in color. HM2 interrogated and flows have been 8.5-9 L/min with no alarms or power surges. Cardiology consulted in the ED, dicussed with HTS, and decision made to admit to ICU for further mgmt.       * Left ventricular assist device (LVAD) complication  The patient presented with COVID and found to have an uptrending LDH with increased power.  He underwent HM III upgrade on 7/13/22  -Daily LDH   -Continue Heparin drip    Procedure: Device Interrogation Including analysis of device parameters  Current Settings: Ventricular Assist Device    TXP LVAD INTERROGATIONS 7/29/2022 7/29/2022 7/29/2022 7/29/2022 7/29/2022  7/29/2022 7/29/2022   Type HeartMate3 HeartMate3 HeartMate3 HeartMate3 HeartMate3 HeartMate3 HeartMate3   Flow 5.2 5.1 5.0 5.0 5.1 5.2 5.1   Speed 6000 6000 6000 6000 6000 6000 6000   PI 3.5 3.4 3.4 3.4 3.4 3.5 2.4   Power (Jackson) 4.8 4.8 4.7 4.7 4.7 4.7 5.5   LSL 5600 5600 5600 5600 5600 5600 6000   Low Flow Alarm - - - - - - -   Pulsatility - Intermittent pulse Intermittent pulse Intermittent pulse Intermittent pulse Intermittent pulse Intermittent pulse       Dizziness  Patient endorses positional dizziness  Consulted ENT for evaluation    Constipation  -Patient had large bowel movement  -Urged patient to continue aggressive bowel regimen  -MBS within normal limits  -will advance diet as tolerated, no issues    Acute hypercapnic respiratory failure  Currently requiring tracheostomy  Will consult surgery at this time to evaluate for possible trach downsize    History of ventricular tachycardia  Patient experienced an episode of VT on 6/30 and experienced an ICD shock thought to be related to hypokalemia (K of 3.1 on 6/30)  - Aggressively replete K, keep K>4 and Mg>2  - Continue mexiletine PO, lidocaine gtt, and amiodarone gtt   - EP signed off and recommending Amiodarone and Mexilitine  - Continue to monitor on telemetry    COVID-19 virus infection  -Previously hypoxic then recovered  -ID was consulted, recommended Remdesivir, course completed    amiodarone induced hypothyrodism  -Continue levothyroxine 112 mcg   -management per endocrinology,     Chronic combined systolic and diastolic heart failure  -Previously on HM2 but complicated by thrombosis related to COVID  -Underwent HM III upgrade on 7/13/22, currently on [facility] ECMO  -Volume status: Currently fluid overloaded, will continue lasix PO at 20mg daily  -Chest tube removal, bronch, and old driveline removed 7/22  -Hyponatremia improving  -Currently being bridged with heparin, continue coumadin, currently on dose of 6mg, INR of 1.2 today        Nic Torres,  MD  Heart Transplant  [facility] - Cardiology Stepdown

## 2022-08-24 NOTE — PROGRESS NOTES
08/24/2022  Carissa Dean    Current provider:  Roslyn Ragsdale DO    Device interrogation:  TXP LVAD INTERROGATIONS 8/24/2022 8/24/2022 8/23/2022 8/23/2022 8/23/2022 8/23/2022 8/23/2022   Type HeartMate3 HeartMate3 HeartMate3 HeartMate3 HeartMate3 HeartMate3 HeartMate3   Flow 5.6 5.3 5.5 5.5 5.0 5.5 5.3   Speed 6300 6300 6300 6300 6300 6300 6300   PI 2.2 3.1 3.0 3.3 3.5 3.5 3.4   Power (Jackson) 3.3 5.5 5.3 5.4 5.3 5.3 5.4   LSL 5900 - - - - - 5900   Low Flow Alarm - - - - - - -   Pulsatility - No Pulse No Pulse - - - -          Rounded on Tim Richards to ensure all mechanical assist device settings (IABP or VAD) were appropriate and all parameters were within limits.  I was able to ensure all back up equipment was present, the staff had no issues, and the Perfusion Department daily rounding was complete.      For implantable VADs: Interrogation of Ventricular assist device was performed with analysis of device parameters and review of device function. I have personally reviewed the interrogation findings and agree with findings as stated.     In emergency, the nursing units have been notified to contact the perfusion department either by:  Calling [phone] from 630am to 4pm Mon thru Fri, utilizing the On-Call Finder functionality of Epic and searching for Perfusion, or by contacting the hospital  from 4pm to 630am and on weekends and asking to speak with the perfusionist on call.    8:48 AM

## 2022-08-25 PROBLEM — D72.829 LEUKOCYTOSIS: Status: RESOLVED | Noted: 2019-07-17 | Resolved: 2022-08-25

## 2022-08-25 PROBLEM — R06.02 SHORTNESS OF BREATH: Status: RESOLVED | Noted: 2019-10-12 | Resolved: 2022-08-25

## 2022-08-25 PROBLEM — M10.9 GOUT: Status: RESOLVED | Noted: 2020-07-06 | Resolved: 2022-08-25

## 2022-08-25 PROBLEM — Z87.891 PERSONAL HISTORY OF NICOTINE DEPENDENCE: Status: RESOLVED | Noted: 2020-07-01 | Resolved: 2022-08-25

## 2022-08-25 PROBLEM — Z45.02 IMPLANTABLE CARDIOVERTER-DEFIBRILLATOR (ICD) DISCHARGE: Status: RESOLVED | Noted: 2021-02-23 | Resolved: 2022-08-25

## 2022-08-25 LAB
ALBUMIN SERPL BCP-MCNC: 2.1 G/DL (ref 3.5–5.2)
ALP SERPL-CCNC: 159 U/L (ref 55–135)
ALT SERPL W/O P-5'-P-CCNC: 53 U/L (ref 10–44)
ANION GAP SERPL CALC-SCNC: 6 MMOL/L (ref 8–16)
APTT BLDCRRT: 49.3 SEC (ref 21–32)
AST SERPL-CCNC: 29 U/L (ref 10–40)
BASOPHILS # BLD AUTO: 0.01 K/UL (ref 0–0.2)
BASOPHILS NFR BLD: 0.1 % (ref 0–1.9)
BILIRUB DIRECT SERPL-MCNC: 0.8 MG/DL (ref 0.1–0.3)
BILIRUB SERPL-MCNC: 1.2 MG/DL (ref 0.1–1)
BUN SERPL-MCNC: 20 MG/DL (ref 6–20)
CALCIUM SERPL-MCNC: 8.9 MG/DL (ref 8.7–10.5)
CHLORIDE SERPL-SCNC: 100 MMOL/L (ref 95–110)
CO2 SERPL-SCNC: 28 MMOL/L (ref 23–29)
CREAT SERPL-MCNC: 1.2 MG/DL (ref 0.5–1.4)
DIFFERENTIAL METHOD: ABNORMAL
EOSINOPHIL # BLD AUTO: 0 K/UL (ref 0–0.5)
EOSINOPHIL NFR BLD: 0.2 % (ref 0–8)
ERYTHROCYTE [DISTWIDTH] IN BLOOD BY AUTOMATED COUNT: 17.9 % (ref 11.5–14.5)
EST. GFR  (NO RACE VARIABLE): >60 ML/MIN/1.73 M^2
GLUCOSE SERPL-MCNC: 93 MG/DL (ref 70–110)
HCT VFR BLD AUTO: 25.9 % (ref 40–54)
HGB BLD-MCNC: 7.4 G/DL (ref 14–18)
IMM GRANULOCYTES # BLD AUTO: 0.07 K/UL (ref 0–0.04)
IMM GRANULOCYTES NFR BLD AUTO: 0.7 % (ref 0–0.5)
INR PPP: 1.4 (ref 0.8–1.2)
LDH SERPL L TO P-CCNC: 259 U/L (ref 110–260)
LYMPHOCYTES # BLD AUTO: 0.5 K/UL (ref 1–4.8)
LYMPHOCYTES NFR BLD: 4.8 % (ref 18–48)
MAGNESIUM SERPL-MCNC: 2.2 MG/DL (ref 1.6–2.6)
MCH RBC QN AUTO: 27.7 PG (ref 27–31)
MCHC RBC AUTO-ENTMCNC: 28.6 G/DL (ref 32–36)
MCV RBC AUTO: 97 FL (ref 82–98)
MONOCYTES # BLD AUTO: 0.6 K/UL (ref 0.3–1)
MONOCYTES NFR BLD: 6 % (ref 4–15)
NEUTROPHILS # BLD AUTO: 8.4 K/UL (ref 1.8–7.7)
NEUTROPHILS NFR BLD: 88.2 % (ref 38–73)
NRBC BLD-RTO: 0 /100 WBC
PHOSPHATE SERPL-MCNC: 3.2 MG/DL (ref 2.7–4.5)
PLATELET # BLD AUTO: 162 K/UL (ref 150–450)
PMV BLD AUTO: 11.8 FL (ref 9.2–12.9)
POCT GLUCOSE: 84 MG/DL (ref 70–110)
POCT GLUCOSE: 97 MG/DL (ref 70–110)
POTASSIUM SERPL-SCNC: 4.7 MMOL/L (ref 3.5–5.1)
PROT SERPL-MCNC: 5.6 G/DL (ref 6–8.4)
PROTHROMBIN TIME: 14.4 SEC (ref 9–12.5)
RBC # BLD AUTO: 2.67 M/UL (ref 4.6–6.2)
SODIUM SERPL-SCNC: 134 MMOL/L (ref 136–145)
WBC # BLD AUTO: 9.52 K/UL (ref 3.9–12.7)

## 2022-08-25 PROCEDURE — 85025 COMPLETE CBC W/AUTO DIFF WBC: CPT | Performed by: STUDENT IN AN ORGANIZED HEALTH CARE EDUCATION/TRAINING PROGRAM

## 2022-08-25 PROCEDURE — 99900035 HC TECH TIME PER 15 MIN (STAT)

## 2022-08-25 PROCEDURE — 25000003 PHARM REV CODE 250: Performed by: STUDENT IN AN ORGANIZED HEALTH CARE EDUCATION/TRAINING PROGRAM

## 2022-08-25 PROCEDURE — 94761 N-INVAS EAR/PLS OXIMETRY MLT: CPT

## 2022-08-25 PROCEDURE — 80076 HEPATIC FUNCTION PANEL: CPT | Performed by: STUDENT IN AN ORGANIZED HEALTH CARE EDUCATION/TRAINING PROGRAM

## 2022-08-25 PROCEDURE — 36410 VNPNXR 3YR/> PHY/QHP DX/THER: CPT

## 2022-08-25 PROCEDURE — 97535 SELF CARE MNGMENT TRAINING: CPT

## 2022-08-25 PROCEDURE — 93750 PR INTERROGATE VENT ASSIST DEV, IN PERSON, W PHYSICIAN ANALYSIS: ICD-10-PCS | Mod: ,,, | Performed by: INTERNAL MEDICINE

## 2022-08-25 PROCEDURE — 25000242 PHARM REV CODE 250 ALT 637 W/ HCPCS

## 2022-08-25 PROCEDURE — 94640 AIRWAY INHALATION TREATMENT: CPT

## 2022-08-25 PROCEDURE — 83735 ASSAY OF MAGNESIUM: CPT | Performed by: STUDENT IN AN ORGANIZED HEALTH CARE EDUCATION/TRAINING PROGRAM

## 2022-08-25 PROCEDURE — 97530 THERAPEUTIC ACTIVITIES: CPT

## 2022-08-25 PROCEDURE — 25000003 PHARM REV CODE 250

## 2022-08-25 PROCEDURE — 99233 PR SUBSEQUENT HOSPITAL CARE,LEVL III: ICD-10-PCS | Mod: 25,,, | Performed by: INTERNAL MEDICINE

## 2022-08-25 PROCEDURE — 63600175 PHARM REV CODE 636 W HCPCS: Performed by: STUDENT IN AN ORGANIZED HEALTH CARE EDUCATION/TRAINING PROGRAM

## 2022-08-25 PROCEDURE — 99900026 HC AIRWAY MAINTENANCE (STAT)

## 2022-08-25 PROCEDURE — 27000221 HC OXYGEN, UP TO 24 HOURS

## 2022-08-25 PROCEDURE — 99233 SBSQ HOSP IP/OBS HIGH 50: CPT | Mod: 25,,, | Performed by: INTERNAL MEDICINE

## 2022-08-25 PROCEDURE — 27000248 HC VAD-ADDITIONAL DAY

## 2022-08-25 PROCEDURE — 20600001 HC STEP DOWN PRIVATE ROOM

## 2022-08-25 PROCEDURE — 83615 LACTATE (LD) (LDH) ENZYME: CPT | Performed by: INTERNAL MEDICINE

## 2022-08-25 PROCEDURE — 94668 MNPJ CHEST WALL SBSQ: CPT

## 2022-08-25 PROCEDURE — C1751 CATH, INF, PER/CENT/MIDLINE: HCPCS

## 2022-08-25 PROCEDURE — 80048 BASIC METABOLIC PNL TOTAL CA: CPT | Performed by: INTERNAL MEDICINE

## 2022-08-25 PROCEDURE — 93750 INTERROGATION VAD IN PERSON: CPT | Mod: ,,, | Performed by: INTERNAL MEDICINE

## 2022-08-25 PROCEDURE — 25000003 PHARM REV CODE 250: Performed by: INTERNAL MEDICINE

## 2022-08-25 PROCEDURE — 84100 ASSAY OF PHOSPHORUS: CPT | Performed by: STUDENT IN AN ORGANIZED HEALTH CARE EDUCATION/TRAINING PROGRAM

## 2022-08-25 PROCEDURE — 97110 THERAPEUTIC EXERCISES: CPT

## 2022-08-25 PROCEDURE — 76937 US GUIDE VASCULAR ACCESS: CPT

## 2022-08-25 PROCEDURE — 85610 PROTHROMBIN TIME: CPT | Performed by: STUDENT IN AN ORGANIZED HEALTH CARE EDUCATION/TRAINING PROGRAM

## 2022-08-25 PROCEDURE — 85730 THROMBOPLASTIN TIME PARTIAL: CPT | Performed by: INTERNAL MEDICINE

## 2022-08-25 RX ORDER — PREDNISONE 10 MG/1
10 TABLET ORAL DAILY
Status: COMPLETED | OUTPATIENT
Start: 2022-08-26 | End: 2022-08-28

## 2022-08-25 RX ADMIN — Medication 400 MG: at 09:08

## 2022-08-25 RX ADMIN — LEVALBUTEROL HYDROCHLORIDE 0.63 MG: 0.63 SOLUTION RESPIRATORY (INHALATION) at 12:08

## 2022-08-25 RX ADMIN — MEXILETINE HYDROCHLORIDE 400 MG: 200 CAPSULE ORAL at 01:08

## 2022-08-25 RX ADMIN — PREDNISONE 20 MG: 20 TABLET ORAL at 09:08

## 2022-08-25 RX ADMIN — MEXILETINE HYDROCHLORIDE 400 MG: 200 CAPSULE ORAL at 09:08

## 2022-08-25 RX ADMIN — AMIODARONE HYDROCHLORIDE 200 MG: 200 TABLET ORAL at 09:08

## 2022-08-25 RX ADMIN — ASPIRIN 81 MG CHEWABLE TABLET 81 MG: 81 TABLET CHEWABLE at 09:08

## 2022-08-25 RX ADMIN — LEVALBUTEROL HYDROCHLORIDE 0.63 MG: 0.63 SOLUTION RESPIRATORY (INHALATION) at 07:08

## 2022-08-25 RX ADMIN — LEVALBUTEROL HYDROCHLORIDE 0.63 MG: 0.63 SOLUTION RESPIRATORY (INHALATION) at 01:08

## 2022-08-25 RX ADMIN — HEPARIN SODIUM 19 UNITS/KG/HR: 5000 INJECTION INTRAVENOUS; SUBCUTANEOUS at 04:08

## 2022-08-25 RX ADMIN — MEXILETINE HYDROCHLORIDE 400 MG: 200 CAPSULE ORAL at 05:08

## 2022-08-25 RX ADMIN — LEVALBUTEROL HYDROCHLORIDE 0.63 MG: 0.63 SOLUTION RESPIRATORY (INHALATION) at 09:08

## 2022-08-25 RX ADMIN — SENNOSIDES AND DOCUSATE SODIUM 1 TABLET: 50; 8.6 TABLET ORAL at 09:08

## 2022-08-25 RX ADMIN — Medication 400 MG: at 08:08

## 2022-08-25 RX ADMIN — WARFARIN SODIUM 6 MG: 3 TABLET ORAL at 04:08

## 2022-08-25 RX ADMIN — PANTOPRAZOLE SODIUM 40 MG: 40 TABLET, DELAYED RELEASE ORAL at 09:08

## 2022-08-25 NOTE — CONSULTS
NAOMY placed 20g, 8 cm Midline in left brachial vein using u/s guidance.  Max dwell date 9/23/2022.  Lot # [id number].    MIDLINE inserted for long term PVA

## 2022-08-25 NOTE — PT/OT/SLP PROGRESS
Physical Therapy Treatment    Patient Name:  Tim Richards   MRN:  [id number]  Admit Date: 6/27/2022  Admitting Diagnosis:  Left ventricular assist device (LVAD) complication   Length of Stay: 59 days  Recent Surgery: Procedure(s) (LRB):  CREATION, TRACHEOSTOMY (N/A)  INSERTION, CENTRAL VENOUS ACCESS DEVICE 21 Days Post-Op    Recommendations:     Discharge Recommendations:  rehabilitation facility   Discharge Equipment Recommendations:  (TBD)   Barriers to discharge: None    Plan:     During this hospitalization, patient to be seen 5 x/week to address the listed problems via gait training, therapeutic exercises, therapeutic activities, neuromuscular re-education  · Plan of Care Expires:  09/18/22   Plan of Care Reviewed with: patient    Assessment:     Tim Richards is a 56 y.o. male admitted with a medical diagnosis of Left ventricular assist device (LVAD) complication. Pt found alert and cooperative. Pt demo'd good motivation and participation in session. Pt limited by generalized weakness and pain in back and B LEs. Pt reported no signs of dizziness or diplopia. Pt progressing towards goals, but not at PLOF. Pt tolerated session well with VSS.  Pt is improving with therapy evidenced by increased time in static standing. Pt would benefit from continued PT services to improve overall functional mobility. Pt is an excellent IP rehab candidate due to good activity tolerance, decline from PLOF, good family support, and excellent motivation. Recommend d/c to Rehab to maximize functional independence.        Problem List: weakness, impaired endurance, impaired self care skills, impaired functional mobility, gait instability, impaired balance, impaired cardiopulmonary response to activity.  Rehab Prognosis: Good     GOALS:   Multidisciplinary Problems     Physical Therapy Goals        Problem: Physical Therapy    Goal Priority Disciplines Outcome Goal Variances Interventions   Physical Therapy Goal     PT, PT/OT  Ongoing, Progressing     Description: Goals to be met by: 2022    Patient will increase functional independence with mobility by performin. Supine to sit with MInimal Assistance -not met  2. Sit to stand transfer with Contact Guard Assistance -not met  3. Gait  x 150 feet with Contact Guard Assistance with approved assistive device -not met  4. Ascend/descend 8 stair with right Handrails Contact Guard Assistance -not met  5. Sitting at edge of bed x15 minutes with Contact Guard Assistance to improve tolerance to activities. -not met  6. Lower extremity exercise program x15 reps with assistance as needed -not met               Multidisciplinary Problems (Resolved)        Problem: Physical Therapy    Goal Priority Disciplines Outcome Goal Variances Interventions   Physical Therapy Goal   (Resolved)     PT, PT/OT Met     Description: The patient is near his functional baseline, he ambulated within the room with no AD and independence. Unable to do stair training, assess gait endurance due to COVID restrictions. He is safe to return home with no therapy needs. He is in agreement with PT discharge, he states he is confident he can ascend/descend his stairs.           Problem: Physical Therapy    Goal Priority Disciplines Outcome Goal Variances Interventions   Physical Therapy Goal   (Resolved)     PT, PT/OT Met                     Subjective   Communicated with RN prior to session.  Patient found HOB elevated upon PT entry to room, agreeable to evaluation. Tim Somers Richards's alone during session.    Chief Complaint: debility   Patient/Family Comments/goals: to get better  Pain/Comfort:  · Pain Rating 1: 0/10  · Pain Rating Post-Intervention 1: 0/10    Objective:   Patient found with: wound vac, telemetry, LVAD, oxygen, Tracheostomy   General Precautions: Standard, Cardiac fall, LVAD, sternal   Orthopedic Precautions:N/A   Braces: N/A   Oxygen Device: Trach Collar  Vitals: BP (!) 62/0 (BP Location: Left arm,  "Patient Position: Lying)   Pulse 77   Temp 98.5 °F (36.9 °C) (Oral)   Resp 18   Ht 6' 1" (1.854 m)   Wt 91.4 kg (201 lb 8 oz)   SpO2 (!) 93%   BMI 26.58 kg/m²     Outcome Measures:  AM-PAC 6 CLICK MOBILITY  Turning over in bed (including adjusting bedclothes, sheets and blankets)?: 3  Sitting down on and standing up from a chair with arms (e.g., wheelchair, bedside commode, etc.): 2  Moving from lying on back to sitting on the side of the bed?: 3  Moving to and from a bed to a chair (including a wheelchair)?: 2  Need to walk in hospital room?: 2  Climbing 3-5 steps with a railing?: 1  Basic Mobility Total Score: 13     Functional Mobility:  Additional staff present: OT - due to pt requiring 2 skilled therapists to safely perform functional mobility and to accommodate pt's activity tolerance    Bed Mobility:    Rolling/Turning to Right: stand by assistance   Supine to Sit: stand by assistance; HOB elevated   Scooting anteriorly to EOB to have both feet planted on floor: stand by assistance   Sit to Supine: stand by assistance; HOB flat    Sitting Balance at Edge of Bed:   Assistance Level Required: Supervision   Time: 15 minutes   Comments:   o Worked on activity tolerance sitting EOB and worked on tolerance to positional change     Transfers:    Sit <> Stand Transfer: minimum assistance and of 2 persons with no assistive device from raised EOB x 3 trials  o Pt continues with difficulty gaining balance once in static stand  o Pt with c/o back and B LE pain when standing       Gait  Not performed 2nd to working on increasing time in standing to be able to progressing to ambulation     Therapeutic Activities, Exercises, and Education:   Educated pt on PT role/POC  Educated pt on importance of OOB activity and daily ambulation   Educated pt on sternal precautions  Pt verbalized understanding      Therapeutic Exercise  Mobility for generalized strengthening  Sit to stand x 3 reps to increase B LE " strengthen and increase standing tolerance  Standing x 1 trial x 1 minute to increase B LE strength and increase standing tolerance     Pt performed bathing and dressing EOB with OT       Patient left HOB elevated with all lines intact, call button in reach and RN notified..    Time Tracking:     PT Received On: 08/25/22  PT Start Time: 0828     PT Stop Time: 0855  PT Total Time (min): 27 min       Billable Minutes:   · Therapeutic Activity 8 and Therapeutic Exercise 15    Treatment Type: Treatment  PT/PTA: PT

## 2022-08-25 NOTE — PROGRESS NOTES
Interdisciplinary Rounds Report:   Attended interdisciplinary rounds with the [facility]/CTS services including the LVAD Coordinators, social workers, cardiologists, surgeons,  PT/OT/Speech, dietician, and unit charge nurses. Discussed patient status, plan of care, goals of care, including DC date, and post discharge needs. Plan of care will be discussed with the patient and/or family per the physician while rounding on the floor. This is a recurring meeting that is medically and socially necessary to collaborate with the interdisciplinary team to assist patient needs and safe discharge.

## 2022-08-25 NOTE — SUBJECTIVE & OBJECTIVE
Overnight: No acute events overnight per nursing and chart review.  Vertigo evaluated by ENT yesterday without high suspicion for central or peripheral causes.  Had one episode last night that resolved with one dose of meclizine.      Interval History: Has been working well with physical therapy, is standing well.  Tolerating a diet well, finishing about 50% of his meals but taking all of his boost supplements.  Endorses an appetite, is still having bowel movements, last being yesterday.  Denies shortness of breath, lightheadedness, or chest pain at this time.  No alarms upon interrogation of LVAD or upon review of telemetry.    Continuous Infusions:   dextrose 10 % in water (D10W)      heparin (porcine) in D5W 19 Units/kg/hr (08/25/22 1614)     Scheduled Meds:   amiodarone  200 mg Oral Daily    aspirin  81 mg Oral Daily    levalbuterol  0.63 mg Nebulization Q6H    magnesium oxide  400 mg Oral BID    mexiletine  400 mg Oral Q8H    mirtazapine  15 mg Oral QHS    ondansetron  4 mg Intravenous Once    pantoprazole  40 mg Oral Daily    polyethylene glycol  17 g Oral Daily    [START ON 8/26/2022] predniSONE  10 mg Oral Daily    senna-docusate 8.6-50 mg  1 tablet Oral Daily    warfarin  6 mg Oral Daily     PRN Meds:sodium chloride 0.9%, acetaminophen, ALPRAZolam, barium sulfate, barium sulfate, bisacodyL, dextrose 10 % in water (D10W), dextrose 10%, dextrose 10%, glucagon (human recombinant), glucose, guaiFENesin 100 mg/5 ml, HYDROmorphone, HYDROmorphone, hydrOXYzine HCL, meclizine, melatonin, ondansetron    Review of patient's allergies indicates:   Allergen Reactions    Lisinopril Anaphylaxis    Hydralazine analogues      Chronic constipation, impotence, dizziness     Objective:     Vital Signs (Most Recent):  Temp: 98.5 °F (36.9 °C) (08/25/22 1608)  Pulse: 74 (08/25/22 1625)  Resp: 18 (08/25/22 1608)  BP: (!) 66/0 (08/25/22 1608)  SpO2: 95 % (08/25/22 1608)   Vital Signs (24h Range):  Temp:  [96.9 °F (36.1 °C)-98.5 °F  (36.9 °C)] 98.5 °F (36.9 °C)  Pulse:  [65-86] 74  Resp:  [16-20] 18  SpO2:  [93 %-99 %] 95 %  BP: (62-88)/(0) 66/0     Patient Vitals for the past 72 hrs (Last 3 readings):   Weight   08/25/22 0500 91.4 kg (201 lb 8 oz)   08/24/22 0400 90.9 kg (200 lb 8 oz)   08/23/22 0400 93.6 kg (206 lb 4.8 oz)     Body mass index is 26.58 kg/m².      Intake/Output Summary (Last 24 hours) at 8/25/2022 1640  Last data filed at 8/25/2022 0900  Gross per 24 hour   Intake 237 ml   Output 850 ml   Net -613 ml       Hemodynamic Parameters:       Telemetry: No acute events for the past 24 hours on review of telemetry    Physical Exam  Vitals and nursing note reviewed.   Constitutional:       General: He is not in acute distress.     Appearance: Normal appearance. He is not ill-appearing.   HENT:      Head: Normocephalic and atraumatic.      Ears:      Abel exam findings: Lateralizes left.     Right Rinne: AC > BC.     Left Rinne: AC > BC.     Nose: Nose normal.      Mouth/Throat:      Mouth: Mucous membranes are moist.      Comments: Tracheostomy site is clean and dry without drainage  Eyes:      Extraocular Movements: Extraocular movements intact.      Conjunctiva/sclera: Conjunctivae normal.      Pupils: Pupils are equal, round, and reactive to light.   Cardiovascular:      Rate and Rhythm: Normal rate.      Pulses: Normal pulses.      Heart sounds: Normal heart sounds.      Comments: VAD hum can be heard, no significant JVD noted on exam  Pulmonary:      Effort: Pulmonary effort is normal.      Breath sounds: Normal breath sounds. No stridor.   Abdominal:      General: Abdomen is flat. There is no distension.      Palpations: Abdomen is soft.      Tenderness: There is no abdominal tenderness. There is no guarding.   Musculoskeletal:         General: Normal range of motion.      Cervical back: No rigidity.   Lymphadenopathy:      Cervical: No cervical adenopathy.   Skin:     General: Skin is warm and dry.      Capillary Refill:  Capillary refill takes less than 2 seconds.   Neurological:      General: No focal deficit present.      Mental Status: He is alert and oriented to person, place, and time.   Psychiatric:         Mood and Affect: Mood normal.         Behavior: Behavior normal.         Thought Content: Thought content normal.         Judgment: Judgment normal.       Significant Labs:  CBC:  Recent Labs   Lab 08/23/22 0434 08/24/22 0448 08/25/22 0338   WBC 9.77 7.97 9.52   RBC 2.66* 2.87* 2.67*   HGB 7.5* 7.9* 7.4*   HCT 25.4* 28.2* 25.9*   * 168 162   MCV 96 98 97   MCH 28.2 27.5 27.7   MCHC 29.5* 28.0* 28.6*     BNP:  Recent Labs   Lab 08/19/22  0523 08/22/22  0510 08/24/22 0448   *  482* 886* 568*     CMP:  Recent Labs   Lab 08/22/22  0510 08/23/22 0434 08/24/22 0448 08/24/22  0856 08/25/22  0338   GLU 69*   < > 66* 68* 93   CALCIUM 8.9   < > 8.9 8.7 8.9   ALBUMIN 2.1*  --   --  2.2* 2.1*   PROT 5.9*  --   --  5.9* 5.6*   *   < > 133* 132* 134*   K 4.7   < > 4.9 4.5 4.7   CO2 26   < > 28 28 28      < > 100 99 100   BUN 21*   < > 18 17 20   CREATININE 1.1   < > 1.1 1.1 1.2   ALKPHOS 182*  --   --  166* 159*   ALT 58*  --   --  56* 53*   AST 34  --   --  32 29   BILITOT 1.6*  --   --  1.3* 1.2*    < > = values in this interval not displayed.      Coagulation:   Recent Labs   Lab 08/23/22 0434 08/24/22 0448 08/25/22  0338   INR 1.1 1.2 1.4*   APTT 43.0* 42.0* 49.3*     LDH:  Recent Labs   Lab 08/23/22 0434 08/24/22 0448 08/25/22  0338    298* 259     Microbiology:  Microbiology Results (last 7 days)       ** No results found for the last 168 hours. **            I have reviewed all pertinent labs within the past 24 hours.    Estimated Creatinine Clearance: 78.6 mL/min (based on SCr of 1.2 mg/dL).    Diagnostic Results:  I have reviewed all pertinent imaging results/findings within the past 24 hours.

## 2022-08-25 NOTE — PROGRESS NOTES
08/25/2022  Carissa Dean    Current provider:  Roslyn Ragsdale DO    Device interrogation:  TXP LVAD INTERROGATIONS 8/25/2022 8/24/2022 8/24/2022 8/24/2022 8/24/2022 8/24/2022 8/24/2022   Type HeartMate3 HeartMate3 HeartMate3 HeartMate3 HeartMate3 HeartMate3 HeartMate3   Flow 5.4 5.3 5.4 5.6 5.7 5.6 5.3   Speed 6350 6350 6350 6300 6300 6300 6300   PI 2.1 3.3 2.9 2.8 1.6 2.2 3.1   Power (Jackson) 5.4 5.3 5.4 5.3 5.3 3.3 5.5   LSL - 5900 5900 5900 5900 5900 -   Low Flow Alarm - - - - - - -   Pulsatility - - - - - - No Pulse          Rounded on Tim Richards to ensure all mechanical assist device settings (IABP or VAD) were appropriate and all parameters were within limits.  I was able to ensure all back up equipment was present, the staff had no issues, and the Perfusion Department daily rounding was complete.      For implantable VADs: Interrogation of Ventricular assist device was performed with analysis of device parameters and review of device function. I have personally reviewed the interrogation findings and agree with findings as stated.     In emergency, the nursing units have been notified to contact the perfusion department either by:  Calling [id number] from 630am to 4pm Mon thru Fri, utilizing the On-Call Finder functionality of Epic and searching for Perfusion, or by contacting the hospital  from 4pm to 630am and on weekends and asking to speak with the perfusionist on call.    7:16 AM

## 2022-08-25 NOTE — ASSESSMENT & PLAN NOTE
-Previously on HM2 but complicated by thrombosis related to COVID  -Underwent HM III upgrade on 7/13/22  -Volume status: Euvolemic, will monitor. No diuretics for now  -Chest tube removal, bronch, and old driveline removed 7/22  -Currently being bridged with heparin, continue coumadin to goal INR of 2-3

## 2022-08-25 NOTE — PROGRESS NOTES
[facility] - Cardiology Stepdown  Heart Transplant  Progress Note    Patient Name: Tim Richards  MRN: [id number]  Admission Date: 6/27/2022  Hospital Length of Stay: 59 days  Attending Physician: Roslyn Ragsdale DO  Primary Care Provider: Deyanira Booth MD  Principal Problem:Left ventricular assist device (LVAD) complication    Subjective:     Overnight: No acute events overnight per nursing and chart review.  Vertigo evaluated by ENT yesterday without high suspicion for central or peripheral causes.  Had one episode last night that resolved with one dose of meclizine.      Interval History: Has been working well with physical therapy, is standing well.  Tolerating a diet well, finishing about 50% of his meals but taking all of his boost supplements.  Endorses an appetite, is still having bowel movements, last being yesterday.  Denies shortness of breath, lightheadedness, or chest pain at this time.  No alarms upon interrogation of LVAD or upon review of telemetry.    Continuous Infusions:   dextrose 10 % in water (D10W)      heparin (porcine) in D5W 19 Units/kg/hr (08/25/22 1614)     Scheduled Meds:   amiodarone  200 mg Oral Daily    aspirin  81 mg Oral Daily    levalbuterol  0.63 mg Nebulization Q6H    magnesium oxide  400 mg Oral BID    mexiletine  400 mg Oral Q8H    mirtazapine  15 mg Oral QHS    ondansetron  4 mg Intravenous Once    pantoprazole  40 mg Oral Daily    polyethylene glycol  17 g Oral Daily    [START ON 8/26/2022] predniSONE  10 mg Oral Daily    senna-docusate 8.6-50 mg  1 tablet Oral Daily    warfarin  6 mg Oral Daily     PRN Meds:sodium chloride 0.9%, acetaminophen, ALPRAZolam, barium sulfate, barium sulfate, bisacodyL, dextrose 10 % in water (D10W), dextrose 10%, dextrose 10%, glucagon (human recombinant), glucose, guaiFENesin 100 mg/5 ml, HYDROmorphone, HYDROmorphone, hydrOXYzine HCL, meclizine, melatonin, ondansetron    Review of patient's allergies indicates:   Allergen  Reactions    Lisinopril Anaphylaxis    Hydralazine analogues      Chronic constipation, impotence, dizziness     Objective:     Vital Signs (Most Recent):  Temp: 98.5 °F (36.9 °C) (08/25/22 1608)  Pulse: 74 (08/25/22 1625)  Resp: 18 (08/25/22 1608)  BP: (!) 66/0 (08/25/22 1608)  SpO2: 95 % (08/25/22 1608)   Vital Signs (24h Range):  Temp:  [96.9 °F (36.1 °C)-98.5 °F (36.9 °C)] 98.5 °F (36.9 °C)  Pulse:  [65-86] 74  Resp:  [16-20] 18  SpO2:  [93 %-99 %] 95 %  BP: (62-88)/(0) 66/0     Patient Vitals for the past 72 hrs (Last 3 readings):   Weight   08/25/22 0500 91.4 kg (201 lb 8 oz)   08/24/22 0400 90.9 kg (200 lb 8 oz)   08/23/22 0400 93.6 kg (206 lb 4.8 oz)     Body mass index is 26.58 kg/m².      Intake/Output Summary (Last 24 hours) at 8/25/2022 1640  Last data filed at 8/25/2022 0900  Gross per 24 hour   Intake 237 ml   Output 850 ml   Net -613 ml       Hemodynamic Parameters:       Telemetry: No acute events for the past 24 hours on review of telemetry    Physical Exam  Vitals and nursing note reviewed.   Constitutional:       General: He is not in acute distress.     Appearance: Normal appearance. He is not ill-appearing.   HENT:      Head: Normocephalic and atraumatic.      Ears:      Abel exam findings: Lateralizes left.     Right Rinne: AC > BC.     Left Rinne: AC > BC.     Nose: Nose normal.      Mouth/Throat:      Mouth: Mucous membranes are moist.      Comments: Tracheostomy site is clean and dry without drainage  Eyes:      Extraocular Movements: Extraocular movements intact.      Conjunctiva/sclera: Conjunctivae normal.      Pupils: Pupils are equal, round, and reactive to light.   Cardiovascular:      Rate and Rhythm: Normal rate.      Pulses: Normal pulses.      Heart sounds: Normal heart sounds.      Comments: VAD hum can be heard, no significant JVD noted on exam  Pulmonary:      Effort: Pulmonary effort is normal.      Breath sounds: Normal breath sounds. No stridor.   Abdominal:      General:  Abdomen is flat. There is no distension.      Palpations: Abdomen is soft.      Tenderness: There is no abdominal tenderness. There is no guarding.   Musculoskeletal:         General: Normal range of motion.      Cervical back: No rigidity.   Lymphadenopathy:      Cervical: No cervical adenopathy.   Skin:     General: Skin is warm and dry.      Capillary Refill: Capillary refill takes less than 2 seconds.   Neurological:      General: No focal deficit present.      Mental Status: He is alert and oriented to person, place, and time.   Psychiatric:         Mood and Affect: Mood normal.         Behavior: Behavior normal.         Thought Content: Thought content normal.         Judgment: Judgment normal.       Significant Labs:  CBC:  Recent Labs   Lab 08/23/22  0434 08/24/22  0448 08/25/22  0338   WBC 9.77 7.97 9.52   RBC 2.66* 2.87* 2.67*   HGB 7.5* 7.9* 7.4*   HCT 25.4* 28.2* 25.9*   * 168 162   MCV 96 98 97   MCH 28.2 27.5 27.7   MCHC 29.5* 28.0* 28.6*     BNP:  Recent Labs   Lab 08/19/22  0523 08/22/22  0510 08/24/22  0448   *  482* 886* 568*     CMP:  Recent Labs   Lab 08/22/22  0510 08/23/22  0434 08/24/22 0448 08/24/22  0856 08/25/22  0338   GLU 69*   < > 66* 68* 93   CALCIUM 8.9   < > 8.9 8.7 8.9   ALBUMIN 2.1*  --   --  2.2* 2.1*   PROT 5.9*  --   --  5.9* 5.6*   *   < > 133* 132* 134*   K 4.7   < > 4.9 4.5 4.7   CO2 26   < > 28 28 28      < > 100 99 100   BUN 21*   < > 18 17 20   CREATININE 1.1   < > 1.1 1.1 1.2   ALKPHOS 182*  --   --  166* 159*   ALT 58*  --   --  56* 53*   AST 34  --   --  32 29   BILITOT 1.6*  --   --  1.3* 1.2*    < > = values in this interval not displayed.      Coagulation:   Recent Labs   Lab 08/23/22  0434 08/24/22  0448 08/25/22  0338   INR 1.1 1.2 1.4*   APTT 43.0* 42.0* 49.3*     LDH:  Recent Labs   Lab 08/23/22  0434 08/24/22  0448 08/25/22  0338    298* 259     Microbiology:  Microbiology Results (last 7 days)       ** No results found for the  last 168 hours. **            I have reviewed all pertinent labs within the past 24 hours.    Estimated Creatinine Clearance: 78.6 mL/min (based on SCr of 1.2 mg/dL).    Diagnostic Results:  I have reviewed all pertinent imaging results/findings within the past 24 hours.    Assessment and Plan:     55 Y/O M with Hx of stage D HFrEF (EF 10%) due to NICM underwent HM2 implant 3/8/18 and exchange of outflow graft 3/9/18, Hx VT/VF s/p SICD 2014 presenting with gradual worsening shortness of breath associated with nonpleuritic, nonradiating bilateral 4/10 retrosternal chest pressure pain.  Symptoms began yesterday and have gradually worsened in the last 12 hours.  He does report going to a family picnic with increased consumption of chicken wings, ribs and other salty meat products.  Prior to symptom onset, he reports nausea, nonbloody diarrhea began yesterday and single episode of nonbloody nonbilious vomiting this morning. He also complains of dizziness, lightheadedness, and a mild HA. SOB worsened this morning prompting him to come to the ED.     In the ED, patient was in respiratory distress requiring BiPAP. MAP 96 and started on Nitro gtt. Work-up revealed WBC 10, K 5.4, Cr 2.5 (baseline 1.9), BNP 1950 (baseline 200-300s), Bili 2.1, LDH 1058, and INR 3.2. Bedside TTE with severely reduced EF, AV not opening, septum bulging into RV. Given IV Lasix 80 mg x1 with only 100-200 mL UOP and dark in color. HM2 interrogated and flows have been 8.5-9 L/min with no alarms or power surges. Cardiology consulted in the ED, dicussed with HTS, and decision made to admit to ICU for further mgmt.       * Left ventricular assist device (LVAD) complication  The patient presented with COVID and found to have an uptrending LDH with increased power.  He underwent HM III upgrade on 7/13/22  -Daily LDH   -Continue Heparin drip    Procedure: Device Interrogation Including analysis of device parameters  Current Settings: Ventricular Assist  Device    TXP LVAD INTERROGATIONS 7/29/2022 7/29/2022 7/29/2022 7/29/2022 7/29/2022 7/29/2022 7/29/2022   Type HeartMate3 HeartMate3 HeartMate3 HeartMate3 HeartMate3 HeartMate3 HeartMate3   Flow 5.2 5.1 5.0 5.0 5.1 5.2 5.1   Speed 6000 6000 6000 6000 6000 6000 6000   PI 3.5 3.4 3.4 3.4 3.4 3.5 2.4   Power (Jackson) 4.8 4.8 4.7 4.7 4.7 4.7 5.5   LSL 5600 5600 5600 5600 5600 5600 6000   Low Flow Alarm - - - - - - -   Pulsatility - Intermittent pulse Intermittent pulse Intermittent pulse Intermittent pulse Intermittent pulse Intermittent pulse       Dizziness  Patient endorses positional dizziness  No evidence of central or peripheral vertigo per ENT eval  Will continue to monitor, patient seems to respond to meclizine but still unclear etiology    Acute hypercapnic respiratory failure  Currently requiring tracheostomy, tolerating well  Will attempt to downsize tracheostomy while inpatient, currently with 8Fr    History of ventricular tachycardia  Patient experienced an episode of VT on 6/30 and experienced an ICD shock thought to be related to hypokalemia (K of 3.1 on 6/30)  - Aggressively replete K, keep K>4 and Mg>2  - Continue mexiletine PO, lidocaine gtt, and amiodarone gtt   - EP signed off and recommending Amiodarone and Mexilitine  -Have reached out to EP regarding weaning mexilitine dose, appreciate recommendations    COVID-19 virus infection  -Previously hypoxic then recovered  -ID was consulted, recommended Remdesivir, course completed    amiodarone induced hypothyrodism  -Continue levothyroxine 112 mcg  -was treated for amiodarone induced thyroiditis, no longer on methimazole  -On prednisone for now, will taper from 20mg daily down to 10mg daily x 1 week, will monitor periodic TFTs and signs of HPA dysfunction      Chronic combined systolic and diastolic heart failure  -Previously on HM2 but complicated by thrombosis related to COVID  -Underwent HM III upgrade on 7/13/22  -Volume status: Euvolemic, will  monitor. No diuretics for now  -Chest tube removal, bronch, and old driveline removed 7/22  -Currently being bridged with heparin, continue coumadin to goal INR of 2-3    Advancing diet as tolerated.  Patient continuing to improve, will plan for discharge to inpatient facility with PT services.        Nic Torres MD  Heart Transplant  [facility] - Cardiology Stepdown

## 2022-08-25 NOTE — PHYSICIAN QUERY
PT Name: Tim Richards  MR #: [id number]    DOCUMENTATION CLARIFICATION     CDS/: Waleska Vasquez               Contact information:[email or web address]    This form is a permanent document in the medical record.     Query Date: August 25, 2022    By submitting this query, we are merely seeking further clarification of documentation. Please utilize your independent clinical judgment when addressing the question(s) below.    Supporting Clinical Findings Location in Medical Record   Culture, VAP (BAL)     Collected: 07/20/22 1120       VAP BAL CULTURE KLEBSIELLA AEROGENES  Few  Normal respiratory sachin also present    IV Vancomycin Transplant heart note 7-23     Being treated for sepsis.   On cefepime and flagyl.      Fever  Leukocytosis with associated fever up to 100.9 F status post creation of tracheostomy on 8/4/22. Blood cultures this admission have been unrevealing. Only notable pathogen isolated was Klebsiella aerogenes from sputum for which patient has been treated with cefepime. Metronidazole was added for anaerobic coverage.      Recommendations  --Continue IV cefepime and metronidazole for originally anticipated 14 day pneumonia course; stop date 8/9/22      Transplant heart note 8-4        Infectious disease note 8-5       Provider, please clarify if there is any clinical correlation between Sepsis and   Klebsiella aerogenes.           Are the conditions:      [  ] Due to or associated with each other   [  ] Unrelated to each other   [  ] Other explanation (Please Specify): ______________   [  ] Clinically Undetermined                                                                               Please document in your progress notes daily for the duration of treatment until resolved and include in your discharge summary.

## 2022-08-25 NOTE — NURSING
Rounded on the patient, he was complaining of some flushed feeling. He said he was feeling hot and slightly dizzy. VS taken and all within normal range. Doppler of 88/0. Gave him Meclizine PRN and helped. Pt currently resting. YANET Rosario made aware. Will continue to monitor.

## 2022-08-25 NOTE — PT/OT/SLP PROGRESS
Occupational Therapy   Co-Treatment    Name: Tim Richards  MRN: [id number]  Admitting Diagnosis:  Left ventricular assist device (LVAD) complication  21 Days Post-Op    Recommendations:     Discharge Recommendations: rehabilitation facility  Discharge Equipment Recommendations:   (TBD closer to d/c)  Barriers to discharge:  Decreased caregiver support, Inaccessible home environment    Assessment:     Tim Richards is a 56 y.o. male with a medical diagnosis of Left ventricular assist device (LVAD) complication.  He presents with progress towards goals as evidenced by ability to tolerate activities including bathing seated EOB, VAD management, UB/LB dressing and prolonged standing time. Performance deficits affecting function are weakness, impaired balance, impaired endurance, impaired self care skills, impaired functional mobility, gait instability, impaired cardiopulmonary response to activity, decreased upper extremity function. Pt appropriate for cotreat due to acuity and complexity of pt's medical status with 2 skilled disciplines needed to optimize pts functional performance 2* impaired cardiopulmonary response.      Rehab Prognosis:  Good; patient would benefit from acute skilled OT services to address these deficits and reach maximum level of function.       Plan:     Patient to be seen 5 x/week to address the above listed problems via self-care/home management, therapeutic activities, therapeutic exercises  · Plan of Care Expires: 08/25/22  · Plan of Care Reviewed with: patient    Subjective     Pain/Comfort:  · Pain Rating 1: 0/10  · Pain Rating Post-Intervention 1: 0/10    Objective:     Communicated with: RN prior to session.  Patient found supine with telemetry, LVAD, Tracheostomy, oxygen, wound vac (Midline) upon OT entry to room.    General Precautions: Standard, LVAD, fall, sternal   Orthopedic Precautions:N/A   Braces:    Respiratory Status: Trach collar     Occupational Performance:     Bed  Mobility:    · Patient completed Supine to Sit with stand by assistance  · Patient completed Sit to Supine with stand by assistance   · Scooting to EOB with SBA    Functional Mobility/Transfers:  · Patient completed Sit <> Stand Transfer with minimum assistance and of 2 persons  with  hand-held assist  x 3 trials  · Functional Mobility: NT    Activities of Daily Living:  · Feeding:  independence    · Grooming: independence seated EOB; unable to tolerate in standing  · Upper Body Dressing: minimum assistance    · Lower Body Dressing: moderate assistance     · Bathing: minimal A seated EOB      AMPAC 6 Click ADL: 18    Treatment & Education:  Pt ed on OT POC  Pt ed on importance of activity progression  Pt ed on ROM ex's 3x daily for increased overall strength and endurance      Patient left HOB elevated with all lines intact, call button in reach and RN notifiedEducation:      GOALS:   Multidisciplinary Problems     Occupational Therapy Goals        Problem: Occupational Therapy    Goal Priority Disciplines Outcome Interventions   Occupational Therapy Goal     OT, PT/OT Ongoing, Progressing    Description: Goals to be met by: 8/9/22     Patient will increase functional independence with ADLs by performing:    UE Dressing with Stand-by Assistance.  LE Dressing with Stand-by Assistance.  Grooming while standing at sink with Stand-by Assistance.  Toileting from toilet with Stand-by Assistance for hygiene and clothing management.   Toilet transfer to toilet with Stand-by Assistance.               Multidisciplinary Problems (Resolved)        Problem: Occupational Therapy    Goal Priority Disciplines Outcome Interventions   Occupational Therapy Goal   (Resolved)     OT, PT/OT Met           Problem: Occupational Therapy    Goal Priority Disciplines Outcome Interventions   Occupational Therapy Goal   (Resolved)     OT, PT/OT Met                    Time Tracking:     OT Date of Treatment: 08/25/22  OT Start Time: 0830  OT  Stop Time: 0855  OT Total Time (min): 25 min    Billable Minutes:Self Care/Home Management 15  Therapeutic Activity 8               8/25/2022

## 2022-08-25 NOTE — PLAN OF CARE
Plan of care discussed with patient. Patient Aox4 and VS stable. Patient remained free of falls, fall precautions in place. LVAD DP and numbers WNL, smooth LVAD hum. Patient has no complaints of pain. Heparin gtt continuing. No spells of dizziness or double vision today. Discussed medications and care. Patient has no questions at this time. Will continue to monitor.

## 2022-08-25 NOTE — PROGRESS NOTES
08/25/22 1334 08/25/22 1337   Vital Signs   BP (!) 64/0 (!) 64/0   BP Location Right arm Right arm   BP Method Doppler Doppler   Patient Position Lying Sitting   Orthostatic VS Yes Yes   Is Patient Symptomatic in this Position? No No   MD requested orthostatics on patient. Patient unable to stand right now due to PT/OT session earlier in day. BP taken in lying and sitting position. MD notified of results.

## 2022-08-25 NOTE — PROGRESS NOTES
"   08/25/22 1700        VAD 07/13/22 1300 Left ventricular assist device HeartMate 3   Placement Date/Time: 07/13/22 1300   Present Prior to Hospital Arrival?: No  Inserted by: MD  VAD Type: Left ventricular assist device  VAD Brand: HeartMate 3   Site Location Abdomen left   Site Assessment Intact;Draining;Sutures intact   Driveline Exit Site 2   Driveline Assessment Free of Kinks;Intact;Modular cable Clean and Locked   Dressing Status Clean;Dry;Intact   Dressing Intervention Sterile dressing change   Performed By RN   Dressing Change Schedule Daily   Dressing Change Due 08/26/22   LVAD dressing change completed using sterile technique with daily kit. DLES is a "2" with minimal drainage noted on the drain sponge. Tolerated without any complication. Sutures remain intact, no redness, or tenderness noted.    "

## 2022-08-25 NOTE — ASSESSMENT & PLAN NOTE
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

## 2022-08-25 NOTE — PLAN OF CARE
Pt maintained free from falls/ trauma/ injuries. All his wound and incision drsg CDI, wound vac intact. Pt denied pain but was complaining of discomfort mid shift. (Detail in another note) Pt is free from SOB on trach collar. LVAD free from LFA and drsg due today. Pt is on heparin drip going at 19 units, am Aptt 49.3 therapeutic. BG before bed was 93. Plan of care reviewed. Pt verbalized understanding. All questions and concerns addressed. Will continue to monitor.

## 2022-08-26 PROBLEM — Z79.01 ANTICOAGULATION MONITORING, INR RANGE 2-3: Status: RESOLVED | Noted: 2018-03-27 | Resolved: 2022-08-26

## 2022-08-26 PROBLEM — Z79.899 HIGH RISK MEDICATIONS (NOT ANTICOAGULANTS) LONG-TERM USE: Status: RESOLVED | Noted: 2018-01-10 | Resolved: 2022-08-26

## 2022-08-26 PROBLEM — U07.1 COVID-19 VIRUS INFECTION: Status: RESOLVED | Noted: 2022-06-27 | Resolved: 2022-08-26

## 2022-08-26 PROBLEM — N18.32 STAGE 3B CHRONIC KIDNEY DISEASE: Status: RESOLVED | Noted: 2018-01-12 | Resolved: 2022-08-26

## 2022-08-26 PROBLEM — I50.9 ACUTE DECOMPENSATED HEART FAILURE: Status: RESOLVED | Noted: 2018-01-12 | Resolved: 2022-08-26

## 2022-08-26 LAB
ANION GAP SERPL CALC-SCNC: 8 MMOL/L (ref 8–16)
APTT BLDCRRT: 40.2 SEC (ref 21–32)
APTT BLDCRRT: 80.3 SEC (ref 21–32)
BASOPHILS # BLD AUTO: 0 K/UL (ref 0–0.2)
BASOPHILS NFR BLD: 0 % (ref 0–1.9)
BNP SERPL-MCNC: 210 PG/ML (ref 0–99)
BUN SERPL-MCNC: 18 MG/DL (ref 6–20)
CALCIUM SERPL-MCNC: 8.8 MG/DL (ref 8.7–10.5)
CHLORIDE SERPL-SCNC: 98 MMOL/L (ref 95–110)
CO2 SERPL-SCNC: 26 MMOL/L (ref 23–29)
CREAT SERPL-MCNC: 0.9 MG/DL (ref 0.5–1.4)
CRP SERPL-MCNC: 15.2 MG/L (ref 0–8.2)
DIFFERENTIAL METHOD: ABNORMAL
EOSINOPHIL # BLD AUTO: 0.1 K/UL (ref 0–0.5)
EOSINOPHIL NFR BLD: 1.3 % (ref 0–8)
ERYTHROCYTE [DISTWIDTH] IN BLOOD BY AUTOMATED COUNT: 18 % (ref 11.5–14.5)
EST. GFR  (NO RACE VARIABLE): >60 ML/MIN/1.73 M^2
FERRITIN SERPL-MCNC: 349 NG/ML (ref 20–300)
GLUCOSE SERPL-MCNC: 82 MG/DL (ref 70–110)
HCT VFR BLD AUTO: 27.5 % (ref 40–54)
HGB BLD-MCNC: 7.7 G/DL (ref 14–18)
IMM GRANULOCYTES # BLD AUTO: 0.04 K/UL (ref 0–0.04)
IMM GRANULOCYTES NFR BLD AUTO: 0.6 % (ref 0–0.5)
INR PPP: 1.4 (ref 0.8–1.2)
IRON SERPL-MCNC: 23 UG/DL (ref 45–160)
LDH SERPL L TO P-CCNC: 269 U/L (ref 110–260)
LYMPHOCYTES # BLD AUTO: 0.5 K/UL (ref 1–4.8)
LYMPHOCYTES NFR BLD: 7 % (ref 18–48)
MAGNESIUM SERPL-MCNC: 2 MG/DL (ref 1.6–2.6)
MCH RBC QN AUTO: 27.2 PG (ref 27–31)
MCHC RBC AUTO-ENTMCNC: 28 G/DL (ref 32–36)
MCV RBC AUTO: 97 FL (ref 82–98)
MONOCYTES # BLD AUTO: 0.6 K/UL (ref 0.3–1)
MONOCYTES NFR BLD: 8 % (ref 4–15)
NEUTROPHILS # BLD AUTO: 5.9 K/UL (ref 1.8–7.7)
NEUTROPHILS NFR BLD: 83.1 % (ref 38–73)
NRBC BLD-RTO: 0 /100 WBC
PHOSPHATE SERPL-MCNC: 2.7 MG/DL (ref 2.7–4.5)
PLATELET # BLD AUTO: 171 K/UL (ref 150–450)
PMV BLD AUTO: 11.5 FL (ref 9.2–12.9)
POCT GLUCOSE: 106 MG/DL (ref 70–110)
POCT GLUCOSE: 113 MG/DL (ref 70–110)
POCT GLUCOSE: 115 MG/DL (ref 70–110)
POCT GLUCOSE: 131 MG/DL (ref 70–110)
POCT GLUCOSE: 63 MG/DL (ref 70–110)
POCT GLUCOSE: 66 MG/DL (ref 70–110)
POCT GLUCOSE: 70 MG/DL (ref 70–110)
POCT GLUCOSE: 74 MG/DL (ref 70–110)
POCT GLUCOSE: 96 MG/DL (ref 70–110)
POTASSIUM SERPL-SCNC: 4.4 MMOL/L (ref 3.5–5.1)
PREALB SERPL-MCNC: 23 MG/DL (ref 20–43)
PROTHROMBIN TIME: 14.8 SEC (ref 9–12.5)
RBC # BLD AUTO: 2.83 M/UL (ref 4.6–6.2)
SATURATED IRON: 10 % (ref 20–50)
SODIUM SERPL-SCNC: 132 MMOL/L (ref 136–145)
T3 SERPL-MCNC: <40 NG/DL (ref 60–180)
T4 FREE SERPL-MCNC: 0.87 NG/DL (ref 0.71–1.51)
TOTAL IRON BINDING CAPACITY: 232 UG/DL (ref 250–450)
TRANSFERRIN SERPL-MCNC: 157 MG/DL (ref 200–375)
TSH SERPL DL<=0.005 MIU/L-ACNC: 10.35 UIU/ML (ref 0.4–4)
WBC # BLD AUTO: 7.12 K/UL (ref 3.9–12.7)

## 2022-08-26 PROCEDURE — 85730 THROMBOPLASTIN TIME PARTIAL: CPT | Mod: 91 | Performed by: INTERNAL MEDICINE

## 2022-08-26 PROCEDURE — 25000003 PHARM REV CODE 250: Performed by: INTERNAL MEDICINE

## 2022-08-26 PROCEDURE — 82728 ASSAY OF FERRITIN: CPT | Performed by: STUDENT IN AN ORGANIZED HEALTH CARE EDUCATION/TRAINING PROGRAM

## 2022-08-26 PROCEDURE — 84480 ASSAY TRIIODOTHYRONINE (T3): CPT

## 2022-08-26 PROCEDURE — 25000003 PHARM REV CODE 250: Performed by: STUDENT IN AN ORGANIZED HEALTH CARE EDUCATION/TRAINING PROGRAM

## 2022-08-26 PROCEDURE — 27000248 HC VAD-ADDITIONAL DAY

## 2022-08-26 PROCEDURE — 94668 MNPJ CHEST WALL SBSQ: CPT

## 2022-08-26 PROCEDURE — 84466 ASSAY OF TRANSFERRIN: CPT | Performed by: STUDENT IN AN ORGANIZED HEALTH CARE EDUCATION/TRAINING PROGRAM

## 2022-08-26 PROCEDURE — 97530 THERAPEUTIC ACTIVITIES: CPT

## 2022-08-26 PROCEDURE — 94761 N-INVAS EAR/PLS OXIMETRY MLT: CPT

## 2022-08-26 PROCEDURE — 99900026 HC AIRWAY MAINTENANCE (STAT)

## 2022-08-26 PROCEDURE — 99900035 HC TECH TIME PER 15 MIN (STAT)

## 2022-08-26 PROCEDURE — 84443 ASSAY THYROID STIM HORMONE: CPT

## 2022-08-26 PROCEDURE — 27000221 HC OXYGEN, UP TO 24 HOURS

## 2022-08-26 PROCEDURE — 85610 PROTHROMBIN TIME: CPT | Performed by: STUDENT IN AN ORGANIZED HEALTH CARE EDUCATION/TRAINING PROGRAM

## 2022-08-26 PROCEDURE — 85730 THROMBOPLASTIN TIME PARTIAL: CPT | Performed by: INTERNAL MEDICINE

## 2022-08-26 PROCEDURE — 97535 SELF CARE MNGMENT TRAINING: CPT

## 2022-08-26 PROCEDURE — 25000242 PHARM REV CODE 250 ALT 637 W/ HCPCS

## 2022-08-26 PROCEDURE — 99233 SBSQ HOSP IP/OBS HIGH 50: CPT | Mod: 25,,, | Performed by: INTERNAL MEDICINE

## 2022-08-26 PROCEDURE — 85025 COMPLETE CBC W/AUTO DIFF WBC: CPT | Performed by: STUDENT IN AN ORGANIZED HEALTH CARE EDUCATION/TRAINING PROGRAM

## 2022-08-26 PROCEDURE — 83880 ASSAY OF NATRIURETIC PEPTIDE: CPT | Performed by: INTERNAL MEDICINE

## 2022-08-26 PROCEDURE — 20600001 HC STEP DOWN PRIVATE ROOM

## 2022-08-26 PROCEDURE — 84134 ASSAY OF PREALBUMIN: CPT | Performed by: INTERNAL MEDICINE

## 2022-08-26 PROCEDURE — 99232 SBSQ HOSP IP/OBS MODERATE 35: CPT | Mod: ,,, | Performed by: INTERNAL MEDICINE

## 2022-08-26 PROCEDURE — 93750 PR INTERROGATE VENT ASSIST DEV, IN PERSON, W PHYSICIAN ANALYSIS: ICD-10-PCS | Mod: ,,, | Performed by: INTERNAL MEDICINE

## 2022-08-26 PROCEDURE — 94640 AIRWAY INHALATION TREATMENT: CPT

## 2022-08-26 PROCEDURE — 83735 ASSAY OF MAGNESIUM: CPT | Performed by: STUDENT IN AN ORGANIZED HEALTH CARE EDUCATION/TRAINING PROGRAM

## 2022-08-26 PROCEDURE — 99232 PR SUBSEQUENT HOSPITAL CARE,LEVL II: ICD-10-PCS | Mod: ,,, | Performed by: INTERNAL MEDICINE

## 2022-08-26 PROCEDURE — 83615 LACTATE (LD) (LDH) ENZYME: CPT | Performed by: INTERNAL MEDICINE

## 2022-08-26 PROCEDURE — 93750 INTERROGATION VAD IN PERSON: CPT | Mod: ,,, | Performed by: INTERNAL MEDICINE

## 2022-08-26 PROCEDURE — 25000003 PHARM REV CODE 250

## 2022-08-26 PROCEDURE — 94760 N-INVAS EAR/PLS OXIMETRY 1: CPT

## 2022-08-26 PROCEDURE — 63600175 PHARM REV CODE 636 W HCPCS: Performed by: STUDENT IN AN ORGANIZED HEALTH CARE EDUCATION/TRAINING PROGRAM

## 2022-08-26 PROCEDURE — 97110 THERAPEUTIC EXERCISES: CPT

## 2022-08-26 PROCEDURE — 86140 C-REACTIVE PROTEIN: CPT | Performed by: STUDENT IN AN ORGANIZED HEALTH CARE EDUCATION/TRAINING PROGRAM

## 2022-08-26 PROCEDURE — 80048 BASIC METABOLIC PNL TOTAL CA: CPT | Performed by: INTERNAL MEDICINE

## 2022-08-26 PROCEDURE — 84439 ASSAY OF FREE THYROXINE: CPT

## 2022-08-26 PROCEDURE — 84100 ASSAY OF PHOSPHORUS: CPT | Performed by: STUDENT IN AN ORGANIZED HEALTH CARE EDUCATION/TRAINING PROGRAM

## 2022-08-26 PROCEDURE — 99233 PR SUBSEQUENT HOSPITAL CARE,LEVL III: ICD-10-PCS | Mod: 25,,, | Performed by: INTERNAL MEDICINE

## 2022-08-26 RX ORDER — PREDNISONE 2.5 MG/1
5 TABLET ORAL DAILY
Status: COMPLETED | OUTPATIENT
Start: 2022-08-29 | End: 2022-08-31

## 2022-08-26 RX ORDER — WARFARIN 7.5 MG/1
7.5 TABLET ORAL DAILY
Status: DISCONTINUED | OUTPATIENT
Start: 2022-08-26 | End: 2022-09-01 | Stop reason: HOSPADM

## 2022-08-26 RX ORDER — LEVOTHYROXINE SODIUM 50 UG/1
50 TABLET ORAL
Status: DISCONTINUED | OUTPATIENT
Start: 2022-08-27 | End: 2022-09-01 | Stop reason: HOSPADM

## 2022-08-26 RX ADMIN — MEXILETINE HYDROCHLORIDE 400 MG: 200 CAPSULE ORAL at 03:08

## 2022-08-26 RX ADMIN — LEVALBUTEROL HYDROCHLORIDE 0.63 MG: 0.63 SOLUTION RESPIRATORY (INHALATION) at 08:08

## 2022-08-26 RX ADMIN — PREDNISONE 10 MG: 10 TABLET ORAL at 09:08

## 2022-08-26 RX ADMIN — MEXILETINE HYDROCHLORIDE 400 MG: 200 CAPSULE ORAL at 09:08

## 2022-08-26 RX ADMIN — SENNOSIDES AND DOCUSATE SODIUM 1 TABLET: 50; 8.6 TABLET ORAL at 09:08

## 2022-08-26 RX ADMIN — Medication 400 MG: at 09:08

## 2022-08-26 RX ADMIN — Medication 16 G: at 08:08

## 2022-08-26 RX ADMIN — AMIODARONE HYDROCHLORIDE 200 MG: 200 TABLET ORAL at 09:08

## 2022-08-26 RX ADMIN — LEVALBUTEROL HYDROCHLORIDE 0.63 MG: 0.63 SOLUTION RESPIRATORY (INHALATION) at 01:08

## 2022-08-26 RX ADMIN — LEVALBUTEROL HYDROCHLORIDE 0.63 MG: 0.63 SOLUTION RESPIRATORY (INHALATION) at 07:08

## 2022-08-26 RX ADMIN — MEXILETINE HYDROCHLORIDE 400 MG: 200 CAPSULE ORAL at 06:08

## 2022-08-26 RX ADMIN — HEPARIN SODIUM 19 UNITS/KG/HR: 5000 INJECTION INTRAVENOUS; SUBCUTANEOUS at 09:08

## 2022-08-26 RX ADMIN — ASPIRIN 81 MG CHEWABLE TABLET 81 MG: 81 TABLET CHEWABLE at 09:08

## 2022-08-26 RX ADMIN — LEVALBUTEROL HYDROCHLORIDE 0.63 MG: 0.63 SOLUTION RESPIRATORY (INHALATION) at 12:08

## 2022-08-26 RX ADMIN — PANTOPRAZOLE SODIUM 40 MG: 40 TABLET, DELAYED RELEASE ORAL at 09:08

## 2022-08-26 RX ADMIN — WARFARIN SODIUM 7.5 MG: 7.5 TABLET ORAL at 06:08

## 2022-08-26 NOTE — SUBJECTIVE & OBJECTIVE
Interval History: No acute events overnight.  No alarms on LVAD monitor, no arrhythmias noted on telemetry.  Vital signs were stable, patient had no complaints this morning.  Still has difficulty ambulating but is working with PT.  He is having an appetite and regular bowel movements.      Continuous Infusions:   dextrose 10 % in water (D10W)      heparin (porcine) in D5W 19 Units/kg/hr (08/26/22 0959)     Scheduled Meds:   amiodarone  200 mg Oral Daily    aspirin  81 mg Oral Daily    levalbuterol  0.63 mg Nebulization Q6H    magnesium oxide  400 mg Oral BID    mexiletine  400 mg Oral Q8H    mirtazapine  15 mg Oral QHS    ondansetron  4 mg Intravenous Once    pantoprazole  40 mg Oral Daily    polyethylene glycol  17 g Oral Daily    predniSONE  10 mg Oral Daily    senna-docusate 8.6-50 mg  1 tablet Oral Daily    warfarin  7.5 mg Oral Daily     PRN Meds:sodium chloride 0.9%, acetaminophen, ALPRAZolam, bisacodyL, dextrose 10 % in water (D10W), dextrose 10%, dextrose 10%, glucagon (human recombinant), glucose, guaiFENesin 100 mg/5 ml, HYDROmorphone, HYDROmorphone, hydrOXYzine HCL, meclizine, melatonin, ondansetron    Review of patient's allergies indicates:   Allergen Reactions    Lisinopril Anaphylaxis    Hydralazine analogues      Chronic constipation, impotence, dizziness     Objective:     Vital Signs (Most Recent):  Temp: 98.3 °F (36.8 °C) (08/26/22 1218)  Pulse: 85 (08/26/22 1307)  Resp: 18 (08/26/22 1307)  BP: (!) 72/0 (08/26/22 1218)  SpO2: 96 % (08/26/22 1307)   Vital Signs (24h Range):  Temp:  [97.4 °F (36.3 °C)-98.5 °F (36.9 °C)] 98.3 °F (36.8 °C)  Pulse:  [66-90] 85  Resp:  [16-20] 18  SpO2:  [95 %-99 %] 96 %  BP: (66-78)/(0) 72/0     Patient Vitals for the past 72 hrs (Last 3 readings):   Weight   08/26/22 0500 93.5 kg (206 lb 1.6 oz)   08/25/22 0500 91.4 kg (201 lb 8 oz)   08/24/22 0400 90.9 kg (200 lb 8 oz)     Body mass index is 27.19 kg/m².      Intake/Output Summary (Last 24 hours) at  8/26/2022 1433  Last data filed at 8/26/2022 1222  Gross per 24 hour   Intake 834 ml   Output 1070 ml   Net -236 ml       Hemodynamic Parameters:       Telemetry: No significant arrhythmic events overnight.    Physical Exam  Vitals and nursing note reviewed.   Constitutional:       General: He is not in acute distress.     Appearance: Normal appearance. He is not ill-appearing.   HENT:      Head: Normocephalic and atraumatic.      Ears:      Abel exam findings: Lateralizes left.     Right Rinne: AC > BC.     Left Rinne: AC > BC.     Nose: Nose normal.      Mouth/Throat:      Mouth: Mucous membranes are moist.      Comments: Tracheostomy site is clean and dry without drainage  Eyes:      Extraocular Movements: Extraocular movements intact.      Conjunctiva/sclera: Conjunctivae normal.      Pupils: Pupils are equal, round, and reactive to light.   Cardiovascular:      Rate and Rhythm: Normal rate.      Pulses: Normal pulses.      Heart sounds: Normal heart sounds.      Comments: VAD hum can be heard, no significant JVD noted on exam  Pulmonary:      Effort: Pulmonary effort is normal.      Breath sounds: Normal breath sounds. No stridor.   Abdominal:      General: Abdomen is flat. There is no distension.      Palpations: Abdomen is soft.      Tenderness: There is no abdominal tenderness. There is no guarding.   Musculoskeletal:         General: Normal range of motion.      Cervical back: No rigidity.   Lymphadenopathy:      Cervical: No cervical adenopathy.   Skin:     General: Skin is warm and dry.      Capillary Refill: Capillary refill takes less than 2 seconds.   Neurological:      General: No focal deficit present.      Mental Status: He is alert and oriented to person, place, and time.   Psychiatric:         Mood and Affect: Mood normal.         Behavior: Behavior normal.         Thought Content: Thought content normal.         Judgment: Judgment normal.       Significant Labs:  CBC:  Recent Labs   Lab  08/24/22 0448 08/25/22 0338 08/26/22 0425   WBC 7.97 9.52 7.12   RBC 2.87* 2.67* 2.83*   HGB 7.9* 7.4* 7.7*   HCT 28.2* 25.9* 27.5*    162 171   MCV 98 97 97   MCH 27.5 27.7 27.2   MCHC 28.0* 28.6* 28.0*     BNP:  Recent Labs   Lab 08/22/22  0510 08/24/22  0448 08/26/22  0425   * 568* 210*     CMP:  Recent Labs   Lab 08/22/22  0510 08/23/22 0434 08/24/22 0856 08/25/22 0338 08/26/22 0425   GLU 69*   < > 68* 93 82   CALCIUM 8.9   < > 8.7 8.9 8.8   ALBUMIN 2.1*  --  2.2* 2.1*  --    PROT 5.9*  --  5.9* 5.6*  --    *   < > 132* 134* 132*   K 4.7   < > 4.5 4.7 4.4   CO2 26   < > 28 28 26      < > 99 100 98   BUN 21*   < > 17 20 18   CREATININE 1.1   < > 1.1 1.2 0.9   ALKPHOS 182*  --  166* 159*  --    ALT 58*  --  56* 53*  --    AST 34  --  32 29  --    BILITOT 1.6*  --  1.3* 1.2*  --     < > = values in this interval not displayed.      Coagulation:   Recent Labs   Lab 08/24/22 0448 08/25/22 0338 08/26/22 0426 08/26/22  0556   INR 1.2 1.4* 1.4*  --    APTT 42.0* 49.3* 80.3* 40.2*     LDH:  Recent Labs   Lab 08/24/22 0448 08/25/22 0338 08/26/22 0426   * 259 269*     Microbiology:  Microbiology Results (last 7 days)       ** No results found for the last 168 hours. **            I have reviewed all pertinent labs within the past 24 hours.    Estimated Creatinine Clearance: 104.8 mL/min (based on SCr of 0.9 mg/dL).    Diagnostic Results:  I have reviewed all pertinent imaging results/findings within the past 24 hours.

## 2022-08-26 NOTE — PROCEDURES
"Tim Richards is a 56 y.o. male patient.    Temp: 98.7 °F (37.1 °C) (08/26/22 1549)  Pulse: 70 (08/26/22 1600)  Resp: 18 (08/26/22 1549)  BP: (!) 72/0 (08/26/22 1549)  SpO2: 100 % (08/26/22 1549)  Weight: 93.5 kg (206 lb 1.6 oz) (08/26/22 0500)  Height: 6' 1" (185.4 cm) (08/24/22 1200)    Procedures    TXP LVAD INTERROGATIONS 8/26/2022 8/26/2022 8/26/2022 8/25/2022 8/25/2022 8/25/2022 8/25/2022   Type HeartMate3 HeartMate3 HeartMate3 HeartMate3 HeartMate3 HeartMate3 HeartMate3   Flow 5.3 5.3 5.5 5.4 5.3 5.3 5.7   Speed 6350 6300 6300 6300 6300 6300 6300   PI 3.0 3.5 1.7 2.1 2.9 2.7 1.4   Power (Jackson) 5.3 5.5 5.3 5.4 5.3 5.2 5.3   LSL 5900 5900 5900 5900 5900 5900 5900   Low Flow Alarm no no no - - - -   High Power Alarm no no no - - - -   Pulsatility No Pulse No Pulse No Pulse - - - -   Interrogation of Ventricular assist device was performed with physician analysis of device parameters and review of device function. I have personally reviewed the interrogation findings and agree with findings as stated.         8/26/2022  "

## 2022-08-26 NOTE — PROGRESS NOTES
[facility] - [facility] Stepdown  Endocrinology  Progress Note    Admit Date: 2022     56 year-old  male with NICM with LVAD, s/p ICD for VT on amiodarone and mexiletine and AIT followed by hypothyroidism initially admitted 2022 with COVID respiratory failure complicated with LVAD pump thrombosis that was refractory to medical therapy. Underwent LVAD pump exchange. Post-op course complicated by worsening cardiogenic shock/RV failure requiring VA-ECMO. Improved clinically underwent successful decannulation. Continued episodes of VT with ICD shock  and ongoing anti-arrhythmic mediation adjustments. TFTs on  significant for recurrent hyperthyroidism with undetectable TSH and elevated fT4.    - Patient re-intubated 2022    - Endocrinology consulted for recurrent AIT    Regarding AIT:    - Last seen outpatient by Dr. 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 He required a prolonged course of prednisone and methimazole, then subsequently developed hypothyroidism in May 2020. LT4 was adjusted based on serial TFT measurements. Most recently levothyroxine dose has been 112 mcg     - He self-decreased his amiodarone dose to 200 mg daily about 6 months ago. T4 was high on , but he was continued on levothyroxine 112 mcg    Lab Results   Component Value Date    TSH <0.010 (L) 2022    TSH 0.111 (L) 2022    TSH 4.079 (H) 2022    FREET4 2.51 (H) 2022    FREET4 2.95 (H) 2022    FREET4 2.18 (H) 2022      Latest Reference Range & Units 22 03:42 22 03:10 22 03:29 08/10/22 03:33   Free T4 0.71 - 1.51 ng/dL 2.51 (H) 2.43 (H) 2.23 (H) 2.12 (H)       Interval HPI:   Overnight events: No new overnight concerns.  He is seen eating breakfast and tolerating this.    Eatin%  Nausea: No  Hypoglycemia and intervention: No  Fever: No  TPN and/or TF: No  If yes, type  "of TF/TPN and rate: NA    BP (!) 70/0 (BP Location: Right arm, Patient Position: Lying)   Pulse 84   Temp 98.2 °F (36.8 °C) (Oral)   Resp 20   Ht 6' 1" (1.854 m)   Wt (P) 93.5 kg (206 lb 1.6 oz)   SpO2 95%   BMI (P) 27.19 kg/m²     Labs Reviewed and Include    Recent Labs   Lab 08/26/22  0425   GLU 82   CALCIUM 8.8   *   K 4.4   CO2 26   CL 98   BUN 18   CREATININE 0.9     Lab Results   Component Value Date    WBC 7.12 08/26/2022    HGB 7.7 (L) 08/26/2022    HCT 27.5 (L) 08/26/2022    MCV 97 08/26/2022     08/26/2022     Recent Labs   Lab 08/21/22  0444 08/26/22  0425   TSH 3.940 10.346*   FREET4 0.93 0.87     Lab Results   Component Value Date    HGBA1C 5.4 04/26/2021       Nutritional status:   Body mass index is 27.19 kg/m² (pended).  Lab Results   Component Value Date    ALBUMIN 2.1 (L) 08/25/2022    ALBUMIN 2.2 (L) 08/24/2022    ALBUMIN 2.1 (L) 08/22/2022     Lab Results   Component Value Date    PREALBUMIN 23 08/26/2022    PREALBUMIN 23 08/24/2022    PREALBUMIN 23 08/22/2022       Estimated Creatinine Clearance: 104.8 mL/min (based on SCr of 0.9 mg/dL).    Accu-Checks  Recent Labs     08/23/22  2325 08/24/22  0826 08/24/22  1143 08/24/22  1547 08/24/22  1946 08/24/22  2332 08/25/22  1134 08/25/22  2035 08/26/22  0854 08/26/22  0955   POCTGLUCOSE 89 69* 80 140* 86 93 97 84 63* 106       Current Medications and/or Treatments Impacting Glycemic Control  Immunotherapy:    Immunosuppressants       None          Steroids:   Hormones (From admission, onward)                Start     Stop Route Frequency Ordered    08/26/22 0900  predniSONE tablet 10 mg        Question:  Is the patient competent?  Answer:  Yes    -- Oral Daily 08/25/22 1639    08/23/22 1645  melatonin tablet 6 mg         -- Oral Nightly PRN 08/23/22 1646          Pressors:    Autonomic Drugs (From admission, onward)                Start     Stop Route Frequency Ordered    07/29/22 0011  EPINEPHrine (ADRENALIN) 1 mg/mL injection     "    Note to Pharmacy: Created by cabinet override    07/29 1214   07/29/22 0011          Hyperglycemia/Diabetes Medications:   Antihyperglycemics (From admission, onward)                None            ASSESSMENT and PLAN    * Left ventricular assist device (LVAD) complication  LVAD in place, continues to have runs of VT and remains on amiodarone  Management by primary      Amiodarone-induced hyperthyroidism  Key History and Diagnostic Findings  - History of AIT type 2 (TRAb and TSI negative; Thyroid US unremarkable with low vascularity)  - Weaned off methimazole and prednisone by May 2020, then developed hypothyroidism, Levothyroxine started and dose titrated per TFTs. Prior to current admission he was on Levothyroxine 112 mcg daily  - Labs consistent with hypothyroidism until current admission, initially on 7/13, with low TSH and elevated fT4. Repeat TFTs on 7/27 with worsening hyperthyroidism. He continued to receive LT4 112 mcg through 7/28. He remains on amiodarone for episodes of Ventricular Tachycardia  - Suspect current hyperthyroidism is likely due to AIT-2, however continued exogenous LT4 certainly contribute to current presentation   - fT4 now normal  - Repeat labs 08/26/2022 show TSH rising with Free T4 dropping.     Lab Results   Component Value Date    TSH 10.346 (H) 08/26/2022    TSH 3.940 08/21/2022    TSH 0.127 (L) 08/14/2022    FREET4 0.87 08/26/2022    FREET4 0.93 08/21/2022    FREET4 0.98 08/19/2022     Plan  - Strongly suspect AIT type 2 with improvement seen currently with the use of prednisone.   - Methimazole stopped on 08/22/2022 continues on prednisone, now decrease from 20 mg to 10 mg starting 08/26/2022 which we agree with.   - Would continue Prednisone at 10 mg dose daily for 3 total days, then drop to Prednisone 5 mg daily for 3 days and then discontinue after that.  - Will plan to start Levothyroxine back at lower dose of 50 mcg daily  - Will plan repeat TSH/Free T4 level in one week.        amiodarone induced hypothyrodism  - Levothyroxine previously at dose of 112mcg, discontinued on 7/28/2022  - Please see plan as above    VT (ventricular tachycardia)  Recurrent VT requiring mexiletine and chronic amiodarone   Managed per primary     Hypoglycemia  Key History and Diagnostic Findings  - Initially hyperglycemia noted once starting TPN and later TF in addition to steroids and critical illness when in the ICU  - No prior history of diabetes; most recent A1c of 5.4 on 04/26/2021  - With decreasing steroid dose and overall clinical improvement as well as poor po intake, now with mild asymptomatic hypoglycemia intermittently.    Plan  - Continue accuchecks ACHS and would optimize nutrition, attempting po,although insufficient po intake currently  - Please notify endocrine if any change in diet or tube feeds as this will change insulin requirements.  - discontinued insulin, not requiring sliding scale correction and has been hypoglycemic intermittently        Osmar Vergara, DO  Endocrinology

## 2022-08-26 NOTE — PROGRESS NOTES
Pt AAAO, no family at bedside.  Talked with pt about POC.  He voiced frustration since he isn't able to get up and walk right now.  He knows he is making progress but frustrated.  Emotional support provided.

## 2022-08-26 NOTE — ASSESSMENT & PLAN NOTE
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

## 2022-08-26 NOTE — SUBJECTIVE & OBJECTIVE
"Interval HPI:   Overnight events: No new overnight concerns.  He is seen eating breakfast and tolerating this.    Eatin%  Nausea: No  Hypoglycemia and intervention: No  Fever: No  TPN and/or TF: No  If yes, type of TF/TPN and rate: NA    BP (!) 70/0 (BP Location: Right arm, Patient Position: Lying)   Pulse 84   Temp 98.2 °F (36.8 °C) (Oral)   Resp 20   Ht 6' 1" (1.854 m)   Wt (P) 93.5 kg (206 lb 1.6 oz)   SpO2 95%   BMI (P) 27.19 kg/m²     Labs Reviewed and Include    Recent Labs   Lab 22   GLU 82   CALCIUM 8.8   *   K 4.4   CO2 26   CL 98   BUN 18   CREATININE 0.9     Lab Results   Component Value Date    WBC 7.12 2022    HGB 7.7 (L) 2022    HCT 27.5 (L) 2022    MCV 97 2022     2022     Recent Labs   Lab 22  0444 22  0425   TSH 3.940 10.346*   FREET4 0.93 0.87     Lab Results   Component Value Date    HGBA1C 5.4 2021       Nutritional status:   Body mass index is 27.19 kg/m² (pended).  Lab Results   Component Value Date    ALBUMIN 2.1 (L) 2022    ALBUMIN 2.2 (L) 2022    ALBUMIN 2.1 (L) 2022     Lab Results   Component Value Date    PREALBUMIN 23 2022    PREALBUMIN 23 2022    PREALBUMIN 23 2022       Estimated Creatinine Clearance: 104.8 mL/min (based on SCr of 0.9 mg/dL).    Accu-Checks  Recent Labs     22  2325 22  0826 22  1143 22  1547 22  1946 22  2332 22  1134 22  2035 22  0854 22  0955   POCTGLUCOSE 89 69* 80 140* 86 93 97 84 63* 106       Current Medications and/or Treatments Impacting Glycemic Control  Immunotherapy:    Immunosuppressants       None          Steroids:   Hormones (From admission, onward)                Start     Stop Route Frequency Ordered    22 0900  predniSONE tablet 10 mg        Question:  Is the patient competent?  Answer:  Yes    -- Oral Daily 22 1639    22 1645  melatonin tablet 6 mg     "     -- Oral Nightly PRN 08/23/22 1646          Pressors:    Autonomic Drugs (From admission, onward)                Start     Stop Route Frequency Ordered    07/29/22 0011  EPINEPHrine (ADRENALIN) 1 mg/mL injection        Note to Pharmacy: Created by cabinet override    07/29 1214   07/29/22 0011          Hyperglycemia/Diabetes Medications:   Antihyperglycemics (From admission, onward)                None

## 2022-08-26 NOTE — PLAN OF CARE
Pt aox4. Makes needs known. VAD hum present and smooth. Dopplers and VAD numbers WDL. Pt denies headache, c/o pain or discomfort. Pt progressing. Continue POC.      Problem: Adult Inpatient Plan of Care  Goal: Plan of Care Review  Outcome: Ongoing, Progressing  Goal: Patient-Specific Goal (Individualized)  Outcome: Ongoing, Progressing  Goal: Absence of Hospital-Acquired Illness or Injury  Outcome: Ongoing, Progressing  Goal: Optimal Comfort and Wellbeing  Outcome: Ongoing, Progressing  Goal: Readiness for Transition of Care  Outcome: Ongoing, Progressing     Problem: Infection  Goal: Absence of Infection Signs and Symptoms  Outcome: Ongoing, Progressing     Problem: Neutropenia  Goal: Absence of Infection  Outcome: Ongoing, Progressing     Problem: Fall Injury Risk  Goal: Absence of Fall and Fall-Related Injury  Outcome: Ongoing, Progressing     Problem: Skin Injury Risk Increased  Goal: Skin Health and Integrity  Outcome: Ongoing, Progressing     Problem: Adjustment to Device (Ventricular Assist Device)  Goal: Optimal Adjustment to Device  Outcome: Ongoing, Progressing     Problem: Bleeding (Ventricular Assist Device)  Goal: Absence of Bleeding  Outcome: Ongoing, Progressing     Problem: Embolism (Ventricular Assist Device)  Goal: Absence of Embolism Signs and Symptoms  Outcome: Ongoing, Progressing     Problem: Hemodynamic Instability (Ventricular Assist Device)  Goal: Optimal Blood Flow  Outcome: Ongoing, Progressing     Problem: Infection (Ventricular Assist Device)  Goal: Absence of Infection Signs and Symptoms  Outcome: Ongoing, Progressing     Problem: Right-Sided Heart Compromise (Ventricular Assist Device)  Goal: Effective Right-Sided Heart Function  Outcome: Ongoing, Progressing     Problem: Coping Ineffective  Goal: Effective Coping  Outcome: Ongoing, Progressing     Problem: Communication Impairment (Mechanical Ventilation, Invasive)  Goal: Effective Communication  Outcome: Ongoing, Progressing      Problem: Device-Related Complication Risk (Mechanical Ventilation, Invasive)  Goal: Optimal Device Function  Outcome: Ongoing, Progressing     Problem: Inability to Wean (Mechanical Ventilation, Invasive)  Goal: Mechanical Ventilation Liberation  Outcome: Ongoing, Progressing     Problem: Nutrition Impairment (Mechanical Ventilation, Invasive)  Goal: Optimal Nutrition Delivery  Outcome: Ongoing, Progressing     Problem: Skin and Tissue Injury (Mechanical Ventilation, Invasive)  Goal: Absence of Device-Related Skin and Tissue Injury  Outcome: Ongoing, Progressing     Problem: Ventilator-Induced Lung Injury (Mechanical Ventilation, Invasive)  Goal: Absence of Ventilator-Induced Lung Injury  Outcome: Ongoing, Progressing     Problem: Communication Impairment (Artificial Airway)  Goal: Effective Communication  Outcome: Ongoing, Progressing     Problem: Device-Related Complication Risk (Artificial Airway)  Goal: Optimal Device Function  Outcome: Ongoing, Progressing     Problem: Skin and Tissue Injury (Artificial Airway)  Goal: Absence of Device-Related Skin or Tissue Injury  Outcome: Ongoing, Progressing     Problem: Noninvasive Ventilation Acute  Goal: Effective Unassisted Ventilation and Oxygenation  Outcome: Ongoing, Progressing     Problem: Fluid and Electrolyte Imbalance (Acute Kidney Injury/Impairment)  Goal: Fluid and Electrolyte Balance  Outcome: Ongoing, Progressing     Problem: Oral Intake Inadequate (Acute Kidney Injury/Impairment)  Goal: Optimal Nutrition Intake  Outcome: Ongoing, Progressing     Problem: Renal Function Impairment (Acute Kidney Injury/Impairment)  Goal: Effective Renal Function  Outcome: Ongoing, Progressing     Problem: Impaired Wound Healing  Goal: Optimal Wound Healing  Outcome: Ongoing, Progressing

## 2022-08-26 NOTE — ASSESSMENT & PLAN NOTE
Key History and Diagnostic Findings  - History of AIT type 2 (TRAb and TSI negative; Thyroid US unremarkable with low vascularity)  - Weaned off methimazole and prednisone by May 2020, then developed hypothyroidism, Levothyroxine started and dose titrated per TFTs. Prior to current admission he was on Levothyroxine 112 mcg daily  - Labs consistent with hypothyroidism until current admission, initially on 7/13, with low TSH and elevated fT4. Repeat TFTs on 7/27 with worsening hyperthyroidism. He continued to receive LT4 112 mcg through 7/28. He remains on amiodarone for episodes of Ventricular Tachycardia  - Suspect current hyperthyroidism is likely due to AIT-2, however continued exogenous LT4 certainly contribute to current presentation   - fT4 now normal  - Repeat labs 08/26/2022 show TSH rising with Free T4 dropping.     Lab Results   Component Value Date    TSH 10.346 (H) 08/26/2022    TSH 3.940 08/21/2022    TSH 0.127 (L) 08/14/2022    FREET4 0.87 08/26/2022    FREET4 0.93 08/21/2022    FREET4 0.98 08/19/2022     Plan  - Strongly suspect AIT type 2 with improvement seen currently with the use of prednisone.   - Methimazole stopped on 08/22/2022 continues on prednisone, now decrease from 20 mg to 10 mg starting 08/26/2022 which we agree with.   - Would continue Prednisone at 10 mg dose daily for 3 total days, then drop to Prednisone 5 mg daily for 3 days and then discontinue after that.  - Will plan to start Levothyroxine back at lower dose of 50 mcg daily  - Will plan repeat TSH/Free T4 level in one week.

## 2022-08-26 NOTE — ASSESSMENT & PLAN NOTE
Currently on mexiletine 400mg TID per EP  Continue at this dose per their recommendations given patient's significant history of VT

## 2022-08-26 NOTE — ASSESSMENT & PLAN NOTE
- Levothyroxine previously at dose of 112mcg, discontinued on 7/28/2022  - Please see plan as above

## 2022-08-26 NOTE — PT/OT/SLP PROGRESS
Physical Therapy  Treatment    Patient Name:  Tim Richards   MRN:  [id number]    Recommendations:     Discharge Recommendations:  rehabilitation facility   Discharge Equipment Recommendations: other (see comments) (TBD)   Barriers to discharge: decreased functional mobility, fall risk and decreased caregiver support    Assessment:     Tim Richards is a 56 y.o. male admitted with a medical diagnosis of Left ventricular assist device (LVAD) complication.  Pt demonstrates the below listed impairments with decreased tolerance to functional mobility, weakness and impaired endurance being the most limiting.  Pt demonstrates fair tolerance to edge of bed mobility, participates in self care tasks with OT.  Pt is not safe for home discharge at this time due to patient's status as: a fall risk and patient has increased pain and requires skilled PT.      Impairments and functional limitations:  weakness, impaired endurance, impaired self care skills, impaired functional mobility, gait instability, impaired balance, decreased coordination, decreased upper extremity function, decreased lower extremity function, pain, impaired skin, impaired cardiopulmonary response to activity, orthopedic precautions.  These deficits affect their roles and responsibilities in which they were able to complete prior to admit.  Rehab Prognosis:   Good ; patient would benefit from acute skilled PT services 5 x/week to address these deficits, improve quality of life, focus on recovery of impairments, provide patient/caregiver education, reduce fall risk, and reach maximum level of function.  Pt is highly  motivated to participated in skilled PT.    Recent Surgery:   Procedure(s) (LRB):  CREATION, TRACHEOSTOMY (N/A)  INSERTION, CENTRAL VENOUS ACCESS DEVICE 22 Days Post-Op    Plan:     During this hospitalization, patient to be seen 5 x/week to address the identified rehab impairments via therapeutic activities, gait training, therapeutic  exercises, neuromuscular re-education and progress toward the following goals:    · Plan of Care Expires:  09/18/22    Subjective     Chief Complaint: decreased tolerance to functional mobility  Patient/Family Comments/Goals: Progress to inpatient rehab  Pain/Comfort:  · Pain Rating 1: other (see comments) (not rated)  · Location - Side 1: Left  · Location - Orientation 1: generalized  · Location 1: hip  · Pain Addressed 1: Reposition, Distraction  · Pain Rating Post-Intervention 1: other (see comments) (not rated)    Objective:     Communicated with RN prior to session.  Patient found HOB elevated with telemetry, LVAD, wound vac, oxygen, Tracheostomy (midline) upon PT entry to room.     General Precautions: Standard, LVAD, fall, sternal   Orthopedic Precautions:N/A   Braces: N/A  Oxygen Device:      Functional Mobility:  · Bed Mobility:  Rolling Left: stand by assistance  · Scooting: stand by assistance  · Supine to Sit: stand by assistance  · Sit to Supine: stand by assistance  · Scooting up to head of bed in supine: minimum assistance for LE management  · Head of bed position: HOB elevated   · EOB with SBA     · Transfers:  Sit to Stand: minimum assistance of 2 persons with no AD with cues for hand placement and foot placement   · Pt performs x4 stands in all lasting 10-30sec  · Final stand patients L knee magaly   · He returns himself to sitting on each trial   · Lateral sways for pre-gait performed     · Gait: n/a, pt not safe for ambulation    · Balance:   Position Score Time   Static Sitting GOOD-: Takes MODERATE challenges but inconstantly  n/a   Dynamic Sitting FAIR+: Maintains balance through MINIMAL excursions of active trunk motion n/a   Static Standing POOR+: MINIMAL assist to maintain n/a   Dynamic Standing POOR+: MINIMAL assist to maintain n/a     AM-PAC 6 CLICK MOBILITY  Turning over in bed (including adjusting bedclothes, sheets and blankets)?: 3  Sitting down on and standing up from a chair with  arms (e.g., wheelchair, bedside commode, etc.): 2  Moving from lying on back to sitting on the side of the bed?: 3  Moving to and from a bed to a chair (including a wheelchair)?: 2  Need to walk in hospital room?: 2  Climbing 3-5 steps with a railing?: 1  Basic Mobility Total Score: 13     Therapeutic Activities:  Patient educated on role of acute care PT and PT POC, safety while in hospital including calling nurse for mobility, call light usage, benefits of out of bed mobility, breathing technique, fall risk, assistive device use, bed mobility , transfers, gait technique, positioning, posture, risks of prolonged bed rest, possible discharge disposition  and benefits of continued PT by explanation and demonstration.    Patient demonstrates good understanding of education provided this day.   Whiteboard updated    Therapeutic Exercises:  Patient participated in: Bridges to scootch up in bed, education of HEP with demonstration provided   x4 sit to stands for glute strengthening     Patient left HOB elevated with all lines intact, call button in reach and RN notified.    GOALS:   Multidisciplinary Problems     Physical Therapy Goals        Problem: Physical Therapy    Goal Priority Disciplines Outcome Goal Variances Interventions   Physical Therapy Goal     PT, PT/OT Ongoing, Progressing     Description: Goals to be met by: 2022    Patient will increase functional independence with mobility by performin. Supine to sit with MInimal Assistance -not met  2. Sit to stand transfer with Contact Guard Assistance -not met  3. Gait  x 150 feet with Contact Guard Assistance with approved assistive device -not met  4. Ascend/descend 8 stair with right Handrails Contact Guard Assistance -not met  5. Sitting at edge of bed x15 minutes with Contact Guard Assistance to improve tolerance to activities. -not met  6. Lower extremity exercise program x15 reps with assistance as needed -not met               Multidisciplinary  Problems (Resolved)        Problem: Physical Therapy    Goal Priority Disciplines Outcome Goal Variances Interventions   Physical Therapy Goal   (Resolved)     PT, PT/OT Met     Description: The patient is near his functional baseline, he ambulated within the room with no AD and independence. Unable to do stair training, assess gait endurance due to COVID restrictions. He is safe to return home with no therapy needs. He is in agreement with PT discharge, he states he is confident he can ascend/descend his stairs.           Problem: Physical Therapy    Goal Priority Disciplines Outcome Goal Variances Interventions   Physical Therapy Goal   (Resolved)     PT, PT/OT Met                     Time Tracking:     PT Received On: 08/26/22  PT Start Time: 1037     PT Stop Time: 1100  PT Total Time (min): 23 min     Billable Minutes: Therapeutic Activity 10 and Therapeutic Exercise 13    Treatment Type: Treatment  PT/PTA: PT     PTA Visit Number: 0     08/26/2022    Co-treatment performed for this visit due to patient need for two skilled therapists to ensure patient and staff safety, to accommodate for patient activity tolerance/pain management, and maximize functional potential.

## 2022-08-26 NOTE — RESPIRATORY THERAPY
RAPID RESPONSE RESPIRATORY THERAPY PROACTIVE NOTE           Time of visit: 846     Code Status: Full Code   : 1965  Bed: 311/311 A:   MRN: [id number]  Time spent at the bedside: < 15 min    SITUATION    Evaluated patient for: LDA Check     BACKGROUND    Patient has a past medical history of Anticoagulant long-term use, CHF (congestive heart failure), Class 1 obesity due to excess calories with serious comorbidity and body mass index (BMI) of 31.0 to 31.9 in adult, Dilated cardiomyopathy, Disorder of kidney and ureter, Encounter for blood transfusion, Gout, HTN (hypertension), psychiatric care, ICD (implantable cardioverter-defibrillator) infection, Psychiatric problem, Thyroid disease, and Ventricular tachycardia (paroxysmal).  Clinically Significant Surgical Hx: tracheostomy    24 Hours Vitals Range:  Temp:  [97.4 °F (36.3 °C)-98.5 °F (36.9 °C)]   Pulse:  [68-90]   Resp:  [16-20]   BP: (64-78)/(0)   SpO2:  [93 %-98 %]     Labs:    Recent Labs     22  0448 22  0856 22  0338 22  0425   * 132* 134* 132*   K 4.9 4.5 4.7 4.4    99 100 98   CO2 28 28 28 26   CREATININE 1.1 1.1 1.2 0.9   GLU 66* 68* 93 82   PHOS 3.4  --  3.2 2.7   MG 2.2  --  2.2 2.0        Recent Labs     22  2352 22  0041   PH 7.369 7.363   PCO2 47.2* 48.4*   PO2 62* 69*   HCO3 27.2 27.6   POCSATURATED 90* 93*   BE 2 2       ASSESSMENT/INTERVENTIONS  Patient resting comfortably in bed. All safety supplies at bedside. No respiratory issues or concerns at this time.    Last VS   Temp: 98.2 °F (36.8 °C) (815)  Pulse: 68 ( 1000)  Resp: 20 (815)  BP: 70/0 (815)  SpO2: 95 % (815)      Extra trachs at bedside: 6.0 & 8.0 cuffed  Level of Consciousness: Level of Consciousness (AVPU): alert  Respiratory Effort: Respiratory Effort: Normal, Unlabored Expansion/Accessory Muscle Usage: Expansion/Accessory Muscles/Retractions: no retractions, no use of accessory muscles, expansion  symmetric  All Lung Field Breath Sounds: All Lung Fields Breath Sounds: Anterior:, Posterior:, Lateral:  SHERWIN Breath Sounds: clear, diminished  LLL Breath Sounds: diminished  RUL Breath Sounds: clear, diminished  RML Breath Sounds: diminished  RLL Breath Sounds: diminished  O2 Device/Concentration: 10L 40% TC  Surgical airway: Yes, Type: Shiley Size: 8, cuffed  Ambu at bedside: Ambu bag with the patient?: Yes, Adult Ambu     Active Orders   Respiratory Care    Chest physiotherapy Q6H PRN     Frequency: Q6H PRN     Number of Occurrences: Until Specified     Order Questions:      Indications: COPIOUS SPUTUM PRODUCTION    Inhalation Treatment Q6H     Frequency: Q6H     Number of Occurrences: Until Specified    Oxygen Continuous     Frequency: Continuous     Number of Occurrences: Until Specified     Order Questions:      Device type: Low flow      Device: Trach Collar      FiO2%: 40      Titrate O2 per Oxygen Titration Protocol: Yes      To maintain SpO2 goal of: >= 90%      Notify MD of: Inability to achieve desired SpO2; Sudden change in patient status and requires 20% increase in FiO2; Patient requires >60% FiO2    Pulse Oximetry Q4H     Frequency: Q4H     Number of Occurrences: Until Specified    Respiratory care evaluation only Once     Frequency: Once     Number of Occurrences: 1 Occurrences     Order Comments: Consult for tracheostomy exchange/downsize      Routine tracheostomy care     Frequency: BID     Number of Occurrences: Until Specified       RECOMMENDATIONS    We recommend: RRT Recs: Continue POC per primary team.      FOLLOW-UP    Please call back the Rapid Response RT, Taylor Ramsay RRT at x [phone] for any questions or concerns.

## 2022-08-26 NOTE — NURSING
Pt's cbg was 63 this am,pt asymptomatic.16 gm glucose tabs given and a cup of orange juice given.Pt eating breakfast right now.Will recheck cbg in 15 mins again.Nic Torres MD notified.

## 2022-08-26 NOTE — PT/OT/SLP PROGRESS
Occupational Therapy   Treatment    Name: Tim Richards  MRN: [id number]  Admitting Diagnosis:  Left ventricular assist device (LVAD) complication  22 Days Post-Op    Recommendations:     Discharge Recommendations: rehabilitation facility  Discharge Equipment Recommendations:   (TBD)  Barriers to discharge:  Decreased caregiver support, Inaccessible home environment    Assessment:     Tim Richards is a 56 y.o. male with a medical diagnosis of Left ventricular assist device (LVAD) complication.  He presents with progress this date noted by ability to engage in functional task while standing and ability to stand from lower surface (bed not elevated this session). Performance deficits affecting function are weakness, impaired balance, impaired endurance, pain, impaired cardiopulmonary response to activity, impaired self care skills, impaired functional mobility, gait instability, decreased upper extremity function.     Rehab Prognosis:  Good; patient would benefit from acute skilled OT services to address these deficits and reach maximum level of function.       Plan:     Patient to be seen 5 x/week to address the above listed problems via self-care/home management, therapeutic activities, therapeutic exercises, neuromuscular re-education  · Plan of Care Expires: 08/25/22  · Plan of Care Reviewed with: patient    Subjective     Pain/Comfort:  · Location 1:  (Endorses generalized LB and hip pain, but does not rate)  · Pain Addressed 1: Distraction, Reposition    Objective:     Communicated with: RN prior to session.  Patient found supine with telemetry, wound vac, oxygen, Tracheostomy, LVAD (Midline) upon OT entry to room.    General Precautions: Standard, fall, LVAD, sternal   Orthopedic Precautions:N/A   Braces:    Respiratory Status: trach collar     Occupational Performance:     Bed Mobility:    · Patient completed Supine to Sit with stand by assistance  · Patient completed Sit to Supine with stand by  assistance   · Minimal A for scooting to HOB in supine (support at ankles)    Functional Mobility/Transfers:  · Patient completed Sit <> Stand Transfer with minimum assistance and of 2 persons  with  hand-held assist  x 4 trials  · Functional Mobility: NT as pt abruptly returned sitting each trial of STS    Activities of Daily Living:  · Feeding:  independence    · Grooming: independence with tasks; pt complete oral hygiene in standing after set-up.   · Lower Body Dressing: minimum assistance donning socks seated EOB; max A needed for donning pants simulated  · Toileting: maximal assistance for pericare in standing      [facility] 6 Click ADL: 18    Treatment & Education:  Pt ed on OT POC  Pt ed on activity progression  Pt ed on bed level ex's including bridging and scooting  Pt ed on sitting EOB for all meals and visiting over the weekend when family in room    Patient left HOB elevated (pt refused chair transfer) with all lines intact, call button in reach and RN notifiedEducation:      GOALS:   Multidisciplinary Problems     Occupational Therapy Goals        Problem: Occupational Therapy    Goal Priority Disciplines Outcome Interventions   Occupational Therapy Goal     OT, PT/OT Ongoing, Progressing    Description: Goals to be met by: 8/9/22     Patient will increase functional independence with ADLs by performing:    UE Dressing with Stand-by Assistance.  LE Dressing with Stand-by Assistance.  Grooming while standing at sink with Stand-by Assistance.  Toileting from toilet with Stand-by Assistance for hygiene and clothing management.   Toilet transfer to toilet with Stand-by Assistance.               Multidisciplinary Problems (Resolved)        Problem: Occupational Therapy    Goal Priority Disciplines Outcome Interventions   Occupational Therapy Goal   (Resolved)     OT, PT/OT Met           Problem: Occupational Therapy    Goal Priority Disciplines Outcome Interventions   Occupational Therapy Goal   (Resolved)      OT, PT/OT Met                    Time Tracking:     OT Date of Treatment: 08/26/22  OT Start Time: 1037  OT Stop Time: 1100  OT Total Time (min): 23 min    Billable Minutes:Self Care/Home Management 13  Therapeutic Activity 10               8/26/2022

## 2022-08-26 NOTE — ASSESSMENT & PLAN NOTE
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

## 2022-08-26 NOTE — PROGRESS NOTES
08/26/2022  Fitz Christianson    Current provider:  Roslyn Ragsdale DO    Device interrogation:  TXP LVAD INTERROGATIONS 8/26/2022 8/25/2022 8/25/2022 8/25/2022 8/25/2022 8/25/2022 8/24/2022   Type HeartMate3 HeartMate3 HeartMate3 HeartMate3 HeartMate3 HeartMate3 HeartMate3   Flow 5.5 5.4 5.3 5.3 5.7 5.4 5.3   Speed 6300 6300 6300 6300 6300 6350 6350   PI 1.7 2.1 2.9 2.7 1.4 2.1 3.3   Power ([place]) 5.3 5.4 5.3 5.2 5.3 5.4 5.3   LSL 5900 5900 5900 5900 5900 - 5900   Low Flow Alarm no - - - - - -   High Power Alarm no - - - - - -   Pulsatility No Pulse - - - - - -          Rounded on Tim Richards to ensure all mechanical assist device settings (IABP or VAD) were appropriate and all parameters were within limits.  I was able to ensure all back up equipment was present, the staff had no issues, and the Perfusion Department daily rounding was complete.      For implantable VADs: Interrogation of Ventricular assist device was performed with analysis of device parameters and review of device function. I have personally reviewed the interrogation findings and agree with findings as stated.     In emergency, the nursing units have been notified to contact the perfusion department either by:  Calling [phone] from 630am to 4pm Mon thru Fri, utilizing the On-Call Finder functionality of Epic and searching for Perfusion, or by contacting the hospital  from 4pm to 630am and on weekends and asking to speak with the perfusionist on call.    10:02 AM

## 2022-08-26 NOTE — PROGRESS NOTES
Update    Pt presents as AAO x4, calm, cooperative, and asking and answering questions appropriately, caregiver not present. Pt states he is doing well and Pt's voice is getting stronger! Pt is able to eat and enjoying food. LCSW offered support and encouragement. No additional needs or concerns were addressed during visit. SW providing ongoing psychosocial, counseling, & emotional support, education, resources, assistance, and discharge planning as indicated.  SW to continue to follow.

## 2022-08-26 NOTE — NURSING
"LVAD dressing change completed using sterile technique with kit. DLES is a "2" with minimal drainage noted on the drain sponge. Tolerated without any complication. Sutures remain intact, no redness, or tenderness noted. Previous LVAD drive line site on right abdomen repacked with idoform, cleaned with normal saline and covered with gauze. No drainage, emilie wound redness or swelling noted. Patient states previous drive line site remains pain free.   "

## 2022-08-26 NOTE — PROGRESS NOTES
[facility]  Heart Transplant  Progress Note    Patient Name: Tim Richards  MRN: [id number]  Admission Date: 6/27/2022  Hospital Length of Stay: 60 days  Attending Physician: Roslyn Ragsdale DO  Primary Care Provider: Deyanira Booth MD  Principal Problem:Left ventricular assist device (LVAD) complication    Subjective:     Interval History: No acute events overnight.  No alarms on LVAD monitor, no arrhythmias noted on telemetry.  Vital signs were stable, patient had no complaints this morning.  Still has difficulty ambulating but is working with PT.  He is having an appetite and regular bowel movements.      Continuous Infusions:   dextrose 10 % in water (D10W)      heparin (porcine) in D5W 19 Units/kg/hr (08/26/22 0959)     Scheduled Meds:   amiodarone  200 mg Oral Daily    aspirin  81 mg Oral Daily    levalbuterol  0.63 mg Nebulization Q6H    magnesium oxide  400 mg Oral BID    mexiletine  400 mg Oral Q8H    mirtazapine  15 mg Oral QHS    ondansetron  4 mg Intravenous Once    pantoprazole  40 mg Oral Daily    polyethylene glycol  17 g Oral Daily    predniSONE  10 mg Oral Daily    senna-docusate 8.6-50 mg  1 tablet Oral Daily    warfarin  7.5 mg Oral Daily     PRN Meds:sodium chloride 0.9%, acetaminophen, ALPRAZolam, bisacodyL, dextrose 10 % in water (D10W), dextrose 10%, dextrose 10%, glucagon (human recombinant), glucose, guaiFENesin 100 mg/5 ml, HYDROmorphone, HYDROmorphone, hydrOXYzine HCL, meclizine, melatonin, ondansetron    Review of patient's allergies indicates:   Allergen Reactions    Lisinopril Anaphylaxis    Hydralazine analogues      Chronic constipation, impotence, dizziness     Objective:     Vital Signs (Most Recent):  Temp: 98.3 °F (36.8 °C) (08/26/22 1218)  Pulse: 85 (08/26/22 1307)  Resp: 18 (08/26/22 1307)  BP: (!) 72/0 (08/26/22 1218)  SpO2: 96 % (08/26/22 1307)   Vital Signs (24h Range):  Temp:  [97.4 °F (36.3 °C)-98.5 °F (36.9 °C)] 98.3 °F (36.8  °C)  Pulse:  [66-90] 85  Resp:  [16-20] 18  SpO2:  [95 %-99 %] 96 %  BP: (66-78)/(0) 72/0     Patient Vitals for the past 72 hrs (Last 3 readings):   Weight   08/26/22 0500 93.5 kg (206 lb 1.6 oz)   08/25/22 0500 91.4 kg (201 lb 8 oz)   08/24/22 0400 90.9 kg (200 lb 8 oz)     Body mass index is 27.19 kg/m².      Intake/Output Summary (Last 24 hours) at 8/26/2022 1433  Last data filed at 8/26/2022 1222  Gross per 24 hour   Intake 834 ml   Output 1070 ml   Net -236 ml       Hemodynamic Parameters:       Telemetry: No significant arrhythmic events overnight.    Physical Exam  Vitals and nursing note reviewed.   Constitutional:       General: He is not in acute distress.     Appearance: Normal appearance. He is not ill-appearing.   HENT:      Head: Normocephalic and atraumatic.      Ears:      Abel exam findings: Lateralizes left.     Right Rinne: AC > BC.     Left Rinne: AC > BC.     Nose: Nose normal.      Mouth/Throat:      Mouth: Mucous membranes are moist.      Comments: Tracheostomy site is clean and dry without drainage  Eyes:      Extraocular Movements: Extraocular movements intact.      Conjunctiva/sclera: Conjunctivae normal.      Pupils: Pupils are equal, round, and reactive to light.   Cardiovascular:      Rate and Rhythm: Normal rate.      Pulses: Normal pulses.      Heart sounds: Normal heart sounds.      Comments: VAD hum can be heard, no significant JVD noted on exam  Pulmonary:      Effort: Pulmonary effort is normal.      Breath sounds: Normal breath sounds. No stridor.   Abdominal:      General: Abdomen is flat. There is no distension.      Palpations: Abdomen is soft.      Tenderness: There is no abdominal tenderness. There is no guarding.   Musculoskeletal:         General: Normal range of motion.      Cervical back: No rigidity.   Lymphadenopathy:      Cervical: No cervical adenopathy.   Skin:     General: Skin is warm and dry.      Capillary Refill: Capillary refill takes less than 2 seconds.    Neurological:      General: No focal deficit present.      Mental Status: He is alert and oriented to person, place, and time.   Psychiatric:         Mood and Affect: Mood normal.         Behavior: Behavior normal.         Thought Content: Thought content normal.         Judgment: Judgment normal.       Significant Labs:  CBC:  Recent Labs   Lab 08/24/22 0448 08/25/22 0338 08/26/22 0425   WBC 7.97 9.52 7.12   RBC 2.87* 2.67* 2.83*   HGB 7.9* 7.4* 7.7*   HCT 28.2* 25.9* 27.5*    162 171   MCV 98 97 97   MCH 27.5 27.7 27.2   MCHC 28.0* 28.6* 28.0*     BNP:  Recent Labs   Lab 08/22/22  0510 08/24/22  0448 08/26/22  0425   * 568* 210*     CMP:  Recent Labs   Lab 08/22/22  0510 08/23/22  0434 08/24/22  0856 08/25/22 0338 08/26/22 0425   GLU 69*   < > 68* 93 82   CALCIUM 8.9   < > 8.7 8.9 8.8   ALBUMIN 2.1*  --  2.2* 2.1*  --    PROT 5.9*  --  5.9* 5.6*  --    *   < > 132* 134* 132*   K 4.7   < > 4.5 4.7 4.4   CO2 26   < > 28 28 26      < > 99 100 98   BUN 21*   < > 17 20 18   CREATININE 1.1   < > 1.1 1.2 0.9   ALKPHOS 182*  --  166* 159*  --    ALT 58*  --  56* 53*  --    AST 34  --  32 29  --    BILITOT 1.6*  --  1.3* 1.2*  --     < > = values in this interval not displayed.      Coagulation:   Recent Labs   Lab 08/24/22 0448 08/25/22 0338 08/26/22 0426 08/26/22  0556   INR 1.2 1.4* 1.4*  --    APTT 42.0* 49.3* 80.3* 40.2*     LDH:  Recent Labs   Lab 08/24/22  0448 08/25/22  0338 08/26/22  0426   * 259 269*     Microbiology:  Microbiology Results (last 7 days)       ** No results found for the last 168 hours. **            I have reviewed all pertinent labs within the past 24 hours.    Estimated Creatinine Clearance: 104.8 mL/min (based on SCr of 0.9 mg/dL).    Diagnostic Results:  I have reviewed all pertinent imaging results/findings within the past 24 hours.    Assessment and Plan:     55 Y/O M with Hx of stage D HFrEF (EF 10%) due to NICM underwent HM2 implant 3/8/18 and  exchange of outflow graft 3/9/18, Hx VT/VF s/p SICD 2014 presenting with gradual worsening shortness of breath associated with nonpleuritic, nonradiating bilateral 4/10 retrosternal chest pressure pain.  Symptoms began yesterday and have gradually worsened in the last 12 hours.  He does report going to a family picnic with increased consumption of chicken wings, ribs and other salty meat products.  Prior to symptom onset, he reports nausea, nonbloody diarrhea began yesterday and single episode of nonbloody nonbilious vomiting this morning. He also complains of dizziness, lightheadedness, and a mild HA. SOB worsened this morning prompting him to come to the ED.     In the ED, patient was in respiratory distress requiring BiPAP. MAP 96 and started on Nitro gtt. Work-up revealed WBC 10, K 5.4, Cr 2.5 (baseline 1.9), BNP 1950 (baseline 200-300s), Bili 2.1, LDH 1058, and INR 3.2. Bedside TTE with severely reduced EF, AV not opening, septum bulging into RV. Given IV Lasix 80 mg x1 with only 100-200 mL UOP and dark in color. HM2 interrogated and flows have been 8.5-9 L/min with no alarms or power surges. Cardiology consulted in the ED, dicussed with HTS, and decision made to admit to ICU for further mgmt.       * Left ventricular assist device (LVAD) complication  The patient presented with COVID and found to have an uptrending LDH with increased power.  He underwent HM III upgrade on 7/13/22  -Daily LDH   -Continue Heparin drip    Procedure: Device Interrogation Including analysis of device parameters  Current Settings: Ventricular Assist Device    TXP LVAD INTERROGATIONS 7/29/2022 7/29/2022 7/29/2022 7/29/2022 7/29/2022 7/29/2022 7/29/2022   Type HeartMate3 HeartMate3 HeartMate3 HeartMate3 HeartMate3 HeartMate3 HeartMate3   Flow 5.2 5.1 5.0 5.0 5.1 5.2 5.1   Speed 6000 6000 6000 6000 6000 6000 6000   PI 3.5 3.4 3.4 3.4 3.4 3.5 2.4   Power ([place]) 4.8 4.8 4.7 4.7 4.7 4.7 5.5   LSL 5600 5600 5600 5600 5600 5600 6000   Low  Flow Alarm - - - - - - -   Pulsatility - Intermittent pulse Intermittent pulse Intermittent pulse Intermittent pulse Intermittent pulse Intermittent pulse       Dizziness  Patient endorses positional dizziness  Consulted ENT for evaluation, no signs of peripheral or central vertigo    Constipation  -continue bowel regimen    amiodarone induced hypothyrodism  -was treated for amiodarone induced thyroiditis, no longer on methimazole  -On prednisone for now, will taper from 20mg daily down to 10mg daily x 1 week, will monitor periodic TFTs and signs of HPA dysfunction  -Endocrine is continuing to follow, patient now hypothyroid on labs, plan to resume synthroid, appreciate recommendations    VT (ventricular tachycardia)  Currently on mexiletine 400mg TID per EP  Continue at this dose per their recommendations given patient's significant history of VT    Chronic combined systolic and diastolic heart failure  -Previously on HM2 but complicated by thrombosis related to COVID  -Underwent HM III upgrade on 7/13/22  -Volume status: Euvolemic, will monitor. No diuretics for now  -Chest tube removal, bronch, and old driveline removed 7/22  -Currently being bridged with heparin, continue coumadin to goal INR of 2-3        Continue to medically stabilize, continue working with PT.  Endocrinology following for hypothyroidism.      Nic Torres MD  Heart Transplant  [facility]

## 2022-08-26 NOTE — PLAN OF CARE
Problem: Adult Inpatient Plan of Care  Goal: Plan of Care Review  Outcome: Ongoing, Progressing  Goal: Patient-Specific Goal (Individualized)  Outcome: Ongoing, Progressing  Goal: Absence of Hospital-Acquired Illness or Injury  Outcome: Ongoing, Progressing  Goal: Optimal Comfort and Wellbeing  Outcome: Ongoing, Progressing  Goal: Readiness for Transition of Care  Outcome: Ongoing, Progressing     Problem: Infection  Goal: Absence of Infection Signs and Symptoms  Outcome: Ongoing, Progressing     Problem: Fall Injury Risk  Goal: Absence of Fall and Fall-Related Injury  Outcome: Ongoing, Progressing     Problem: Skin Injury Risk Increased  Goal: Skin Health and Integrity  Outcome: Ongoing, Progressing     Problem: Adjustment to Device (Ventricular Assist Device)  Goal: Optimal Adjustment to Device  Outcome: Ongoing, Progressing     Problem: Bleeding (Ventricular Assist Device)  Goal: Absence of Bleeding  Outcome: Ongoing, Progressing     Problem: Embolism (Ventricular Assist Device)  Goal: Absence of Embolism Signs and Symptoms  Outcome: Ongoing, Progressing     Problem: Hemodynamic Instability (Ventricular Assist Device)  Goal: Optimal Blood Flow  Outcome: Ongoing, Progressing     Problem: Infection (Ventricular Assist Device)  Goal: Absence of Infection Signs and Symptoms  Outcome: Ongoing, Progressing     Problem: Right-Sided Heart Compromise (Ventricular Assist Device)  Goal: Effective Right-Sided Heart Function  Outcome: Ongoing, Progressing     Problem: Communication Impairment (Mechanical Ventilation, Invasive)  Goal: Effective Communication  Outcome: Ongoing, Progressing     Problem: Device-Related Complication Risk (Mechanical Ventilation, Invasive)  Goal: Optimal Device Function  Outcome: Ongoing, Progressing     Problem: Inability to Wean (Mechanical Ventilation, Invasive)  Goal: Mechanical Ventilation Liberation  Outcome: Ongoing, Progressing     Problem: Nutrition Impairment (Mechanical Ventilation,  Invasive)  Goal: Optimal Nutrition Delivery  Outcome: Ongoing, Progressing     Problem: Ventilator-Induced Lung Injury (Mechanical Ventilation, Invasive)  Goal: Absence of Ventilator-Induced Lung Injury  Outcome: Ongoing, Progressing     Problem: Communication Impairment (Artificial Airway)  Goal: Effective Communication  Outcome: Ongoing, Progressing     Problem: Skin and Tissue Injury (Artificial Airway)  Goal: Absence of Device-Related Skin or Tissue Injury  Outcome: Ongoing, Progressing     Problem: Fluid and Electrolyte Imbalance (Acute Kidney Injury/Impairment)  Goal: Fluid and Electrolyte Balance  Outcome: Ongoing, Progressing     Problem: Oral Intake Inadequate (Acute Kidney Injury/Impairment)  Goal: Optimal Nutrition Intake  Outcome: Ongoing, Progressing     Problem: Renal Function Impairment (Acute Kidney Injury/Impairment)  Goal: Effective Renal Function  Outcome: Ongoing, Progressing     Problem: Impaired Wound Healing  Goal: Optimal Wound Healing  Outcome: Ongoing, Progressing    AAOX4,VSS,O2 sats>97% on 10 L,40% trach collar.Patient weight shifting,no falls/injuries through the shift,fall precautions in place.LVAD DP and numbers WNL, smooth LVAD hum,no alarms through the shift.Patient has no complaints of pain/SOB.Heparin gtts infusing at 19units/kgs/hr.Discussed medications and care.Patient has no questions at this time.Pt visualised and stable.Pt resting well,call light within reach, bed at the lowest position.

## 2022-08-27 PROBLEM — R57.9 SHOCK: Status: RESOLVED | Noted: 2022-07-23 | Resolved: 2022-08-27

## 2022-08-27 PROBLEM — E87.5 HYPERKALEMIA: Status: RESOLVED | Noted: 2022-08-14 | Resolved: 2022-08-27

## 2022-08-27 PROBLEM — N17.9 AKI (ACUTE KIDNEY INJURY): Status: RESOLVED | Noted: 2022-07-15 | Resolved: 2022-08-27

## 2022-08-27 PROBLEM — I48.92 ATRIAL FLUTTER: Status: RESOLVED | Noted: 2022-07-30 | Resolved: 2022-08-27

## 2022-08-27 PROBLEM — E87.0 HYPERNATREMIA: Status: RESOLVED | Noted: 2022-07-19 | Resolved: 2022-08-27

## 2022-08-27 PROBLEM — E44.0 MODERATE PROTEIN-CALORIE MALNUTRITION: Status: RESOLVED | Noted: 2022-07-18 | Resolved: 2022-08-27

## 2022-08-27 LAB
ANION GAP SERPL CALC-SCNC: 6 MMOL/L (ref 8–16)
APTT BLDCRRT: 39 SEC (ref 21–32)
APTT BLDCRRT: 54.9 SEC (ref 21–32)
APTT BLDCRRT: 64.9 SEC (ref 21–32)
BASOPHILS # BLD AUTO: 0 K/UL (ref 0–0.2)
BASOPHILS NFR BLD: 0 % (ref 0–1.9)
BUN SERPL-MCNC: 16 MG/DL (ref 6–20)
CALCIUM SERPL-MCNC: 9 MG/DL (ref 8.7–10.5)
CHLORIDE SERPL-SCNC: 98 MMOL/L (ref 95–110)
CO2 SERPL-SCNC: 26 MMOL/L (ref 23–29)
CREAT SERPL-MCNC: 1.1 MG/DL (ref 0.5–1.4)
DIFFERENTIAL METHOD: ABNORMAL
EOSINOPHIL # BLD AUTO: 0.1 K/UL (ref 0–0.5)
EOSINOPHIL NFR BLD: 1.1 % (ref 0–8)
ERYTHROCYTE [DISTWIDTH] IN BLOOD BY AUTOMATED COUNT: 17.7 % (ref 11.5–14.5)
EST. GFR  (NO RACE VARIABLE): >60 ML/MIN/1.73 M^2
GLUCOSE SERPL-MCNC: 81 MG/DL (ref 70–110)
HCT VFR BLD AUTO: 27.1 % (ref 40–54)
HGB BLD-MCNC: 7.9 G/DL (ref 14–18)
IMM GRANULOCYTES # BLD AUTO: 0.06 K/UL (ref 0–0.04)
IMM GRANULOCYTES NFR BLD AUTO: 0.8 % (ref 0–0.5)
INR PPP: 1.5 (ref 0.8–1.2)
LDH SERPL L TO P-CCNC: 263 U/L (ref 110–260)
LYMPHOCYTES # BLD AUTO: 0.6 K/UL (ref 1–4.8)
LYMPHOCYTES NFR BLD: 8.1 % (ref 18–48)
MAGNESIUM SERPL-MCNC: 2.1 MG/DL (ref 1.6–2.6)
MCH RBC QN AUTO: 27.4 PG (ref 27–31)
MCHC RBC AUTO-ENTMCNC: 29.2 G/DL (ref 32–36)
MCV RBC AUTO: 94 FL (ref 82–98)
MONOCYTES # BLD AUTO: 0.6 K/UL (ref 0.3–1)
MONOCYTES NFR BLD: 7.6 % (ref 4–15)
NEUTROPHILS # BLD AUTO: 6 K/UL (ref 1.8–7.7)
NEUTROPHILS NFR BLD: 82.4 % (ref 38–73)
NRBC BLD-RTO: 0 /100 WBC
PHOSPHATE SERPL-MCNC: 3 MG/DL (ref 2.7–4.5)
PLATELET # BLD AUTO: 177 K/UL (ref 150–450)
PMV BLD AUTO: 10.6 FL (ref 9.2–12.9)
POCT GLUCOSE: 117 MG/DL (ref 70–110)
POCT GLUCOSE: 124 MG/DL (ref 70–110)
POCT GLUCOSE: 138 MG/DL (ref 70–110)
POCT GLUCOSE: 69 MG/DL (ref 70–110)
POTASSIUM SERPL-SCNC: 4.6 MMOL/L (ref 3.5–5.1)
PROTHROMBIN TIME: 15.4 SEC (ref 9–12.5)
RBC # BLD AUTO: 2.88 M/UL (ref 4.6–6.2)
SODIUM SERPL-SCNC: 130 MMOL/L (ref 136–145)
WBC # BLD AUTO: 7.28 K/UL (ref 3.9–12.7)

## 2022-08-27 PROCEDURE — 25000003 PHARM REV CODE 250

## 2022-08-27 PROCEDURE — 25000242 PHARM REV CODE 250 ALT 637 W/ HCPCS

## 2022-08-27 PROCEDURE — 84100 ASSAY OF PHOSPHORUS: CPT | Performed by: STUDENT IN AN ORGANIZED HEALTH CARE EDUCATION/TRAINING PROGRAM

## 2022-08-27 PROCEDURE — 93750 PR INTERROGATE VENT ASSIST DEV, IN PERSON, W PHYSICIAN ANALYSIS: ICD-10-PCS | Mod: ,,, | Performed by: INTERNAL MEDICINE

## 2022-08-27 PROCEDURE — 83615 LACTATE (LD) (LDH) ENZYME: CPT | Performed by: INTERNAL MEDICINE

## 2022-08-27 PROCEDURE — 99900026 HC AIRWAY MAINTENANCE (STAT)

## 2022-08-27 PROCEDURE — 27000221 HC OXYGEN, UP TO 24 HOURS

## 2022-08-27 PROCEDURE — 25000003 PHARM REV CODE 250: Performed by: STUDENT IN AN ORGANIZED HEALTH CARE EDUCATION/TRAINING PROGRAM

## 2022-08-27 PROCEDURE — 85730 THROMBOPLASTIN TIME PARTIAL: CPT | Mod: 91 | Performed by: INTERNAL MEDICINE

## 2022-08-27 PROCEDURE — 93750 INTERROGATION VAD IN PERSON: CPT | Mod: ,,, | Performed by: INTERNAL MEDICINE

## 2022-08-27 PROCEDURE — 20600001 HC STEP DOWN PRIVATE ROOM

## 2022-08-27 PROCEDURE — 80048 BASIC METABOLIC PNL TOTAL CA: CPT | Performed by: INTERNAL MEDICINE

## 2022-08-27 PROCEDURE — 85610 PROTHROMBIN TIME: CPT | Performed by: STUDENT IN AN ORGANIZED HEALTH CARE EDUCATION/TRAINING PROGRAM

## 2022-08-27 PROCEDURE — 63600175 PHARM REV CODE 636 W HCPCS: Performed by: STUDENT IN AN ORGANIZED HEALTH CARE EDUCATION/TRAINING PROGRAM

## 2022-08-27 PROCEDURE — 83735 ASSAY OF MAGNESIUM: CPT | Performed by: STUDENT IN AN ORGANIZED HEALTH CARE EDUCATION/TRAINING PROGRAM

## 2022-08-27 PROCEDURE — 27000248 HC VAD-ADDITIONAL DAY

## 2022-08-27 PROCEDURE — 99233 PR SUBSEQUENT HOSPITAL CARE,LEVL III: ICD-10-PCS | Mod: 25,,, | Performed by: INTERNAL MEDICINE

## 2022-08-27 PROCEDURE — 94668 MNPJ CHEST WALL SBSQ: CPT

## 2022-08-27 PROCEDURE — 25000003 PHARM REV CODE 250: Performed by: INTERNAL MEDICINE

## 2022-08-27 PROCEDURE — 94640 AIRWAY INHALATION TREATMENT: CPT

## 2022-08-27 PROCEDURE — 94761 N-INVAS EAR/PLS OXIMETRY MLT: CPT

## 2022-08-27 PROCEDURE — 85730 THROMBOPLASTIN TIME PARTIAL: CPT | Performed by: INTERNAL MEDICINE

## 2022-08-27 PROCEDURE — 85025 COMPLETE CBC W/AUTO DIFF WBC: CPT | Performed by: STUDENT IN AN ORGANIZED HEALTH CARE EDUCATION/TRAINING PROGRAM

## 2022-08-27 PROCEDURE — 99233 SBSQ HOSP IP/OBS HIGH 50: CPT | Mod: 25,,, | Performed by: INTERNAL MEDICINE

## 2022-08-27 PROCEDURE — 99900035 HC TECH TIME PER 15 MIN (STAT)

## 2022-08-27 RX ADMIN — ALPRAZOLAM 1 MG: 0.5 TABLET ORAL at 08:08

## 2022-08-27 RX ADMIN — WARFARIN SODIUM 7.5 MG: 7.5 TABLET ORAL at 04:08

## 2022-08-27 RX ADMIN — Medication 400 MG: at 08:08

## 2022-08-27 RX ADMIN — LEVALBUTEROL HYDROCHLORIDE 0.63 MG: 0.63 SOLUTION RESPIRATORY (INHALATION) at 07:08

## 2022-08-27 RX ADMIN — ONDANSETRON 4 MG: 2 INJECTION INTRAMUSCULAR; INTRAVENOUS at 08:08

## 2022-08-27 RX ADMIN — SENNOSIDES AND DOCUSATE SODIUM 1 TABLET: 50; 8.6 TABLET ORAL at 08:08

## 2022-08-27 RX ADMIN — PANTOPRAZOLE SODIUM 40 MG: 40 TABLET, DELAYED RELEASE ORAL at 08:08

## 2022-08-27 RX ADMIN — POLYETHYLENE GLYCOL 3350 17 G: 17 POWDER, FOR SOLUTION ORAL at 08:08

## 2022-08-27 RX ADMIN — HEPARIN SODIUM 19 UNITS/KG/HR: 5000 INJECTION INTRAVENOUS; SUBCUTANEOUS at 05:08

## 2022-08-27 RX ADMIN — MECLIZINE HYDROCHLORIDE 25 MG: 25 TABLET ORAL at 08:08

## 2022-08-27 RX ADMIN — MIRTAZAPINE 15 MG: 15 TABLET, FILM COATED ORAL at 08:08

## 2022-08-27 RX ADMIN — AMIODARONE HYDROCHLORIDE 200 MG: 200 TABLET ORAL at 08:08

## 2022-08-27 RX ADMIN — MEXILETINE HYDROCHLORIDE 400 MG: 200 CAPSULE ORAL at 01:08

## 2022-08-27 RX ADMIN — PREDNISONE 10 MG: 10 TABLET ORAL at 08:08

## 2022-08-27 RX ADMIN — LEVALBUTEROL HYDROCHLORIDE 0.63 MG: 0.63 SOLUTION RESPIRATORY (INHALATION) at 01:08

## 2022-08-27 RX ADMIN — MEXILETINE HYDROCHLORIDE 400 MG: 200 CAPSULE ORAL at 05:08

## 2022-08-27 RX ADMIN — ASPIRIN 81 MG CHEWABLE TABLET 81 MG: 81 TABLET CHEWABLE at 08:08

## 2022-08-27 RX ADMIN — LEVALBUTEROL HYDROCHLORIDE 0.63 MG: 0.63 SOLUTION RESPIRATORY (INHALATION) at 12:08

## 2022-08-27 RX ADMIN — LEVOTHYROXINE SODIUM 50 MCG: 50 TABLET ORAL at 05:08

## 2022-08-27 NOTE — ASSESSMENT & PLAN NOTE
Significant VT hx previously controlled with amio 400mg q d.   Currently on mexiletine 400mg TID per EP and amio was decreased to 200mg.  EP consult for mexiletine dosing as pt having significant nausea and dizziness.

## 2022-08-27 NOTE — SUBJECTIVE & OBJECTIVE
Interval History: Very nauseated since last night and associated with occasional dizziness more prominent on sudden  movements. No vomiting or abdominal pain. Has decreased appetite. Denies any other complaints.       Continuous Infusions:   dextrose 10 % in water (D10W)      heparin (porcine) in D5W 15 Units/kg/hr (08/27/22 1241)     Scheduled Meds:   amiodarone  200 mg Oral Daily    aspirin  81 mg Oral Daily    levalbuterol  0.63 mg Nebulization Q6H    levothyroxine  50 mcg Oral Before breakfast    magnesium oxide  400 mg Oral BID    mexiletine  400 mg Oral Q8H    mirtazapine  15 mg Oral QHS    ondansetron  4 mg Intravenous Once    pantoprazole  40 mg Oral Daily    polyethylene glycol  17 g Oral Daily    predniSONE  10 mg Oral Daily    [START ON 8/29/2022] predniSONE  5 mg Oral Daily    senna-docusate 8.6-50 mg  1 tablet Oral Daily    warfarin  7.5 mg Oral Daily     PRN Meds:sodium chloride 0.9%, acetaminophen, ALPRAZolam, bisacodyL, dextrose 10 % in water (D10W), dextrose 10%, dextrose 10%, glucagon (human recombinant), glucose, guaiFENesin 100 mg/5 ml, HYDROmorphone, HYDROmorphone, hydrOXYzine HCL, meclizine, melatonin, ondansetron    Review of patient's allergies indicates:   Allergen Reactions    Lisinopril Anaphylaxis    Hydralazine analogues      Chronic constipation, impotence, dizziness     Objective:     Vital Signs (Most Recent):  Temp: 98 °F (36.7 °C) (08/27/22 1139)  Pulse: (!) 40 (08/27/22 1308)  Resp: 14 (08/27/22 1308)  BP: (!) 76/0 (08/27/22 1139)  SpO2: 97 % (08/27/22 1308)   Vital Signs (24h Range):  Temp:  [97.1 °F (36.2 °C)-98.7 °F (37.1 °C)] 98 °F (36.7 °C)  Pulse:  [40-85] 40  Resp:  [14-22] 14  SpO2:  [92 %-100 %] 97 %  BP: (68-84)/(0) 76/0     Patient Vitals for the past 72 hrs (Last 3 readings):   Weight   08/27/22 0500 94 kg (207 lb 3.7 oz)   08/26/22 0500 93.5 kg (206 lb 1.6 oz)   08/25/22 0500 91.4 kg (201 lb 8 oz)       Body mass index is 27.34 kg/m².      Intake/Output Summary (Last  24 hours) at 8/27/2022 1314  Last data filed at 8/27/2022 1100  Gross per 24 hour   Intake 1077 ml   Output 1505 ml   Net -428 ml         Hemodynamic Parameters:       Telemetry: No significant arrhythmic events overnight.    Physical Exam  Vitals and nursing note reviewed.   Constitutional:       General: He is not in acute distress.     Appearance: Normal appearance. He is not ill-appearing.   HENT:      Head: Normocephalic and atraumatic.      Ears:      Abel exam findings: Lateralizes left.     Right Rinne: AC > BC.     Left Rinne: AC > BC.     Nose: Nose normal.      Mouth/Throat:      Mouth: Mucous membranes are moist.      Comments: Tracheostomy site is clean and dry without drainage  Eyes:      Extraocular Movements: Extraocular movements intact.      Conjunctiva/sclera: Conjunctivae normal.      Pupils: Pupils are equal, round, and reactive to light.   Cardiovascular:      Rate and Rhythm: Normal rate.      Pulses: Normal pulses.      Heart sounds: Normal heart sounds.      Comments: VAD hum can be heard, no significant JVD noted on exam  Pulmonary:      Effort: Pulmonary effort is normal.      Breath sounds: Normal breath sounds. No stridor.   Abdominal:      General: Abdomen is flat. There is no distension.      Palpations: Abdomen is soft.      Tenderness: There is no abdominal tenderness. There is no guarding.   Musculoskeletal:         General: Normal range of motion.      Cervical back: No rigidity.   Lymphadenopathy:      Cervical: No cervical adenopathy.   Skin:     General: Skin is warm and dry.      Capillary Refill: Capillary refill takes less than 2 seconds.   Neurological:      General: No focal deficit present.      Mental Status: He is alert and oriented to person, place, and time.   Psychiatric:         Mood and Affect: Mood normal.         Behavior: Behavior normal.         Thought Content: Thought content normal.         Judgment: Judgment normal.       Significant Labs:  CBC:  Recent  Labs   Lab 08/25/22 0338 08/26/22  0425 08/27/22  0511   WBC 9.52 7.12 7.28   RBC 2.67* 2.83* 2.88*   HGB 7.4* 7.7* 7.9*   HCT 25.9* 27.5* 27.1*    171 177   MCV 97 97 94   MCH 27.7 27.2 27.4   MCHC 28.6* 28.0* 29.2*       BNP:  Recent Labs   Lab 08/22/22  0510 08/24/22  0448 08/26/22  0425   * 568* 210*       CMP:  Recent Labs   Lab 08/22/22  0510 08/23/22  0434 08/24/22  0856 08/25/22 0338 08/26/22  0425 08/27/22  0511   GLU 69*   < > 68* 93 82 81   CALCIUM 8.9   < > 8.7 8.9 8.8 9.0   ALBUMIN 2.1*  --  2.2* 2.1*  --   --    PROT 5.9*  --  5.9* 5.6*  --   --    *   < > 132* 134* 132* 130*   K 4.7   < > 4.5 4.7 4.4 4.6   CO2 26   < > 28 28 26 26      < > 99 100 98 98   BUN 21*   < > 17 20 18 16   CREATININE 1.1   < > 1.1 1.2 0.9 1.1   ALKPHOS 182*  --  166* 159*  --   --    ALT 58*  --  56* 53*  --   --    AST 34  --  32 29  --   --    BILITOT 1.6*  --  1.3* 1.2*  --   --     < > = values in this interval not displayed.        Coagulation:   Recent Labs   Lab 08/25/22 0338 08/26/22  0426 08/26/22  0556 08/27/22  0511 08/27/22  1208   INR 1.4* 1.4*  --  1.5*  --    APTT 49.3* 80.3* 40.2* 54.9* 64.9*       LDH:  Recent Labs   Lab 08/25/22 0338 08/26/22 0426 08/27/22  0511    269* 263*       Microbiology:  Microbiology Results (last 7 days)       ** No results found for the last 168 hours. **            I have reviewed all pertinent labs within the past 24 hours.    Estimated Creatinine Clearance: 85.8 mL/min (based on SCr of 1.1 mg/dL).    Diagnostic Results:  I have reviewed all pertinent imaging results/findings within the past 24 hours.

## 2022-08-27 NOTE — PLAN OF CARE
Pt free of falls and injury. Pt AAOx4. Fall precautions remain in place. Sternal precautions maintained. NSR on telemetry with LVAD artifact. LVAD hum present and smooth. VAD numbers and dopplers WDL. DLES dressing CDI. BG monitored and on coverage needed. Lab drawn. Plan of care reviewed with pt. Pt resting comfortably with no complaints of pain. Pt denies chest pain, headache, and/or SOB. No acute distress noted, will continue to monitor.

## 2022-08-27 NOTE — PROGRESS NOTES
[facility] - [facility] Step[facility]  Heart Transplant  Progress Note    Patient Name: Tim Richards  MRN: [id number]  Admission Date: 6/27/2022  Hospital Length of Stay: 61 days  Attending Physician: Roslyn Ragsdale DO  Primary Care Provider: Deyanira Booth MD  Principal Problem:Left ventricular assist device (LVAD) complication    Subjective:     Interval History: Very nauseated since last night and associated with occasional dizziness more prominent on sudden  movements. No vomiting or abdominal pain. Has decreased appetite. Denies any other complaints.       Continuous Infusions:   dextrose 10 % in water (D10W)      heparin (porcine) in D5W 15 Units/kg/hr (08/27/22 1241)     Scheduled Meds:   amiodarone  200 mg Oral Daily    aspirin  81 mg Oral Daily    levalbuterol  0.63 mg Nebulization Q6H    levothyroxine  50 mcg Oral Before breakfast    magnesium oxide  400 mg Oral BID    mexiletine  400 mg Oral Q8H    mirtazapine  15 mg Oral QHS    ondansetron  4 mg Intravenous Once    pantoprazole  40 mg Oral Daily    polyethylene glycol  17 g Oral Daily    predniSONE  10 mg Oral Daily    [START ON 8/29/2022] predniSONE  5 mg Oral Daily    senna-docusate 8.6-50 mg  1 tablet Oral Daily    warfarin  7.5 mg Oral Daily     PRN Meds:sodium chloride 0.9%, acetaminophen, ALPRAZolam, bisacodyL, dextrose 10 % in water (D10W), dextrose 10%, dextrose 10%, glucagon (human recombinant), glucose, guaiFENesin 100 mg/5 ml, HYDROmorphone, HYDROmorphone, hydrOXYzine HCL, meclizine, melatonin, ondansetron    Review of patient's allergies indicates:   Allergen Reactions    Lisinopril Anaphylaxis    Hydralazine analogues      Chronic constipation, impotence, dizziness     Objective:     Vital Signs (Most Recent):  Temp: 98 °F (36.7 °C) (08/27/22 1139)  Pulse: (!) 40 (08/27/22 1308)  Resp: 14 (08/27/22 1308)  BP: (!) 76/0 (08/27/22 1139)  SpO2: 97 % (08/27/22 1308)   Vital Signs (24h Range):  Temp:  [97.1 °F (36.2  °C)-98.7 °F (37.1 °C)] 98 °F (36.7 °C)  Pulse:  [40-85] 40  Resp:  [14-22] 14  SpO2:  [92 %-100 %] 97 %  BP: (68-84)/(0) 76/0     Patient Vitals for the past 72 hrs (Last 3 readings):   Weight   08/27/22 0500 94 kg (207 lb 3.7 oz)   08/26/22 0500 93.5 kg (206 lb 1.6 oz)   08/25/22 0500 91.4 kg (201 lb 8 oz)       Body mass index is 27.34 kg/m².      Intake/Output Summary (Last 24 hours) at 8/27/2022 1314  Last data filed at 8/27/2022 1100  Gross per 24 hour   Intake 1077 ml   Output 1505 ml   Net -428 ml         Hemodynamic Parameters:       Telemetry: No significant arrhythmic events overnight.    Physical Exam  Vitals and nursing note reviewed.   Constitutional:       General: He is not in acute distress.     Appearance: Normal appearance. He is not ill-appearing.   HENT:      Head: Normocephalic and atraumatic.      Ears:      Abel exam findings: Lateralizes left.     Right Rinne: AC > BC.     Left Rinne: AC > BC.     Nose: Nose normal.      Mouth/Throat:      Mouth: Mucous membranes are moist.      Comments: Tracheostomy site is clean and dry without drainage  Eyes:      Extraocular Movements: Extraocular movements intact.      Conjunctiva/sclera: Conjunctivae normal.      Pupils: Pupils are equal, round, and reactive to light.   Cardiovascular:      Rate and Rhythm: Normal rate.      Pulses: Normal pulses.      Heart sounds: Normal heart sounds.      Comments: VAD hum can be heard, no significant JVD noted on exam  Pulmonary:      Effort: Pulmonary effort is normal.      Breath sounds: Normal breath sounds. No stridor.   Abdominal:      General: Abdomen is flat. There is no distension.      Palpations: Abdomen is soft.      Tenderness: There is no abdominal tenderness. There is no guarding.   Musculoskeletal:         General: Normal range of motion.      Cervical back: No rigidity.   Lymphadenopathy:      Cervical: No cervical adenopathy.   Skin:     General: Skin is warm and dry.      Capillary Refill:  Capillary refill takes less than 2 seconds.   Neurological:      General: No focal deficit present.      Mental Status: He is alert and oriented to person, place, and time.   Psychiatric:         Mood and Affect: Mood normal.         Behavior: Behavior normal.         Thought Content: Thought content normal.         Judgment: Judgment normal.       Significant Labs:  CBC:  Recent Labs   Lab 08/25/22 0338 08/26/22  0425 08/27/22  0511   WBC 9.52 7.12 7.28   RBC 2.67* 2.83* 2.88*   HGB 7.4* 7.7* 7.9*   HCT 25.9* 27.5* 27.1*    171 177   MCV 97 97 94   MCH 27.7 27.2 27.4   MCHC 28.6* 28.0* 29.2*       BNP:  Recent Labs   Lab 08/22/22  0510 08/24/22 0448 08/26/22  0425   * 568* 210*       CMP:  Recent Labs   Lab 08/22/22  0510 08/23/22  0434 08/24/22  0856 08/25/22 0338 08/26/22  0425 08/27/22  0511   GLU 69*   < > 68* 93 82 81   CALCIUM 8.9   < > 8.7 8.9 8.8 9.0   ALBUMIN 2.1*  --  2.2* 2.1*  --   --    PROT 5.9*  --  5.9* 5.6*  --   --    *   < > 132* 134* 132* 130*   K 4.7   < > 4.5 4.7 4.4 4.6   CO2 26   < > 28 28 26 26      < > 99 100 98 98   BUN 21*   < > 17 20 18 16   CREATININE 1.1   < > 1.1 1.2 0.9 1.1   ALKPHOS 182*  --  166* 159*  --   --    ALT 58*  --  56* 53*  --   --    AST 34  --  32 29  --   --    BILITOT 1.6*  --  1.3* 1.2*  --   --     < > = values in this interval not displayed.        Coagulation:   Recent Labs   Lab 08/25/22 0338 08/26/22  0426 08/26/22  0556 08/27/22  0511 08/27/22  1208   INR 1.4* 1.4*  --  1.5*  --    APTT 49.3* 80.3* 40.2* 54.9* 64.9*       LDH:  Recent Labs   Lab 08/25/22  0338 08/26/22  0426 08/27/22  0511    269* 263*       Microbiology:  Microbiology Results (last 7 days)       ** No results found for the last 168 hours. **            I have reviewed all pertinent labs within the past 24 hours.    Estimated Creatinine Clearance: 85.8 mL/min (based on SCr of 1.1 mg/dL).    Diagnostic Results:  I have reviewed all pertinent imaging  results/findings within the past 24 hours.    Assessment and Plan:     55 Y/O M with Hx of stage D HFrEF (EF 10%) due to NICM underwent HM2 implant 3/8/18 and exchange of outflow graft 3/9/18, Hx VT/VF s/p SICD 2014 presenting with gradual worsening shortness of breath associated with nonpleuritic, nonradiating bilateral 4/10 retrosternal chest pressure pain.  Symptoms began yesterday and have gradually worsened in the last 12 hours.  He does report going to a family picnic with increased consumption of chicken wings, ribs and other salty meat products.  Prior to symptom onset, he reports nausea, nonbloody diarrhea began yesterday and single episode of nonbloody nonbilious vomiting this morning. He also complains of dizziness, lightheadedness, and a mild HA. SOB worsened this morning prompting him to come to the ED.     In the ED, patient was in respiratory distress requiring BiPAP. MAP 96 and started on Nitro gtt. Work-up revealed WBC 10, K 5.4, Cr 2.5 (baseline 1.9), BNP 1950 (baseline 200-300s), Bili 2.1, LDH 1058, and INR 3.2. Bedside TTE with severely reduced EF, AV not opening, septum bulging into RV. Given IV Lasix 80 mg x1 with only 100-200 mL UOP and dark in color. HM2 interrogated and flows have been 8.5-9 L/min with no alarms or power surges. Cardiology consulted in the ED, dicussed with HTS, and decision made to admit to ICU for further mgmt.       * Left ventricular assist device (LVAD) complication  The patient presented with COVID and found to have an uptrending LDH with increased power.  He underwent HM III upgrade on 7/13/22  -Daily LDH   -Continue Heparin drip    Procedure: Device Interrogation Including analysis of device parameters  Current Settings: Ventricular Assist Device    TXP LVAD INTERROGATIONS 7/29/2022 7/29/2022 7/29/2022 7/29/2022 7/29/2022 7/29/2022 7/29/2022   Type HeartMate3 HeartMate3 HeartMate3 HeartMate3 HeartMate3 HeartMate3 HeartMate3   Flow 5.2 5.1 5.0 5.0 5.1 5.2 5.1   Speed  6000 6000 6000 6000 6000 6000 6000   PI 3.5 3.4 3.4 3.4 3.4 3.5 2.4   Power (Jackson) 4.8 4.8 4.7 4.7 4.7 4.7 5.5   LSL 5600 5600 5600 5600 5600 5600 6000   Low Flow Alarm - - - - - - -   Pulsatility - Intermittent pulse Intermittent pulse Intermittent pulse Intermittent pulse Intermittent pulse Intermittent pulse       Dizziness  Patient endorses positional dizziness and now associated with nausea    Constipation  -CT abdomen and pelvis show large stool burden and gas  -Urged patient to continue aggressive bowel regimen  -Currently on lactulose, miralax, and senna docusate but patient has been refusing past 3 days per documentation  -MBS within normal limits  -will advance diet as tolerated    History of ventricular tachycardia  Patient experienced an episode of VT on 6/30 and experienced an ICD shock thought to be related to hypokalemia (K of 3.1 on 6/30)  - Aggressively replete K, keep K>4 and Mg>2  - Continue mexiletine PO and amio 200mg.   - EP signed off and recommending Amiodarone and Mexilitine,  - Continue to monitor on telemetry  - EP consulted    Acute on chronic combined systolic and diastolic congestive heart failure  -not on GDMT due to hypotension    amiodarone induced hypothyrodism  -was treated for amiodarone induced thyroiditis, no longer on methimazole  -On prednisone for now, will taper from 20mg daily down to 10mg daily x 3 days, and then 5mg daily x 3 days, will monitor periodic TFTs and signs of HPA dysfunction  -Endocrine is continuing to follow, patient now hypothyroid on labs, plan to resume synthroid, appreciate recommendations    VT (ventricular tachycardia)  Significant VT hx previously controlled with amio 400mg q d.   Currently on mexiletine 400mg TID per EP and amio was decreased to 200mg.  EP consult for mexiletine dosing as pt having significant nausea and dizziness.     Hypoglycemia  - due to poor PO intake    Chronic combined systolic and diastolic heart failure  -Previously on HM2  but complicated by thrombosis related to COVID  -Underwent HM III upgrade on 7/13/22  -Volume status: Euvolemic, will monitor. No diuretics for now  -Chest tube removal, bronch, and old driveline removed 7/22  -Currently being bridged with heparin, continue coumadin to goal INR of 2-3  - Daily INRs      Discussed with attending Dr. Hadley,     Susannah Garza MD  Heart Transplant  [facility] - [facility] Step[facility]

## 2022-08-27 NOTE — ASSESSMENT & PLAN NOTE
-Previously on HM2 but complicated by thrombosis related to COVID  -Underwent HM III upgrade on 7/13/22  -Volume status: Euvolemic, will monitor. No diuretics for now  -Chest tube removal, bronch, and old driveline removed 7/22  -Currently being bridged with heparin, continue coumadin to goal INR of 2-3  - Daily INRs

## 2022-08-27 NOTE — PLAN OF CARE
Problem: Adult Inpatient Plan of Care  Goal: Plan of Care Review  Outcome: Ongoing, Progressing  Goal: Patient-Specific Goal (Individualized)  Outcome: Ongoing, Progressing  Goal: Absence of Hospital-Acquired Illness or Injury  Outcome: Ongoing, Progressing  Goal: Optimal Comfort and Wellbeing  Outcome: Ongoing, Progressing  Goal: Readiness for Transition of Care  Outcome: Ongoing, Progressing     Problem: Infection  Goal: Absence of Infection Signs and Symptoms  Outcome: Ongoing, Progressing     Problem: Neutropenia  Goal: Absence of Infection  Outcome: Ongoing, Progressing     Problem: Fall Injury Risk  Goal: Absence of Fall and Fall-Related Injury  Outcome: Ongoing, Progressing     Problem: Skin Injury Risk Increased  Goal: Skin Health and Integrity  Outcome: Ongoing, Progressing     Problem: Adjustment to Device (Ventricular Assist Device)  Goal: Optimal Adjustment to Device  Outcome: Ongoing, Progressing     Problem: Bleeding (Ventricular Assist Device)  Goal: Absence of Bleeding  Outcome: Ongoing, Progressing     Problem: Embolism (Ventricular Assist Device)  Goal: Absence of Embolism Signs and Symptoms  Outcome: Ongoing, Progressing     Problem: Infection (Ventricular Assist Device)  Goal: Absence of Infection Signs and Symptoms  Outcome: Ongoing, Progressing     Problem: Right-Sided Heart Compromise (Ventricular Assist Device)  Goal: Effective Right-Sided Heart Function  Outcome: Ongoing, Progressing     Problem: Communication Impairment (Mechanical Ventilation, Invasive)  Goal: Effective Communication  Outcome: Ongoing, Progressing     Problem: Device-Related Complication Risk (Mechanical Ventilation, Invasive)  Goal: Optimal Device Function  Outcome: Ongoing, Progressing     Problem: Inability to Wean (Mechanical Ventilation, Invasive)  Goal: Mechanical Ventilation Liberation  Outcome: Ongoing, Progressing     Problem: Nutrition Impairment (Mechanical Ventilation, Invasive)  Goal: Optimal Nutrition  Delivery  Outcome: Ongoing, Progressing     Problem: Skin and Tissue Injury (Mechanical Ventilation, Invasive)  Goal: Absence of Device-Related Skin and Tissue Injury  Outcome: Ongoing, Progressing     Problem: Ventilator-Induced Lung Injury (Mechanical Ventilation, Invasive)  Goal: Absence of Ventilator-Induced Lung Injury  Outcome: Ongoing, Progressing     Problem: Communication Impairment (Artificial Airway)  Goal: Effective Communication  Outcome: Ongoing, Progressing     Problem: Skin and Tissue Injury (Artificial Airway)  Goal: Absence of Device-Related Skin or Tissue Injury  Outcome: Ongoing, Progressing     Problem: Noninvasive Ventilation Acute  Goal: Effective Unassisted Ventilation and Oxygenation  Outcome: Ongoing, Progressing     Problem: Fluid and Electrolyte Imbalance (Acute Kidney Injury/Impairment)  Goal: Fluid and Electrolyte Balance  Outcome: Ongoing, Progressing     Problem: Renal Function Impairment (Acute Kidney Injury/Impairment)  Goal: Effective Renal Function  Outcome: Ongoing, Progressing     Problem: Impaired Wound Healing  Goal: Optimal Wound Healing  Outcome: Ongoing, Progressing  AAOX4,VSS,O2 sats>94% on 10L,40% tracheostomy collar.Patient weight shifting,no falls/injuries through the shift, fall precautions in place.LVAD DP and numbers WNL, smooth LVAD hum,no alarms through the shift. Patient has no complaints of pain/SOB.Heparin gtts infusing at 15 units/kgs/hr as per EMAR.Discussed medications and care.Patient has no questions at this time.Pt visualised and stable.Pt resting well,call light within reach, bed at the lowest position.

## 2022-08-27 NOTE — PROGRESS NOTES
08/26/22 2335   Vital Signs   Temp 98.1 °F (36.7 °C)   Temp src Oral   Pulse 71   Resp (!) 22   SpO2 96 %   Flow (L/min) 10   O2 Device (Oxygen Therapy) Trach Collar   BP (!) 84/0   Pt c/o dizziness, blurred vision and sweating. BG of 74 obtained and VSS. Pt stated that the symptoms started 10 mins after taking PM magnesium and mexiletine. Apple juice given to pt for BG. [facility] PA Abraham notified about pt's complaints. No further instruction given. Will keep monitoring pt.

## 2022-08-27 NOTE — ASSESSMENT & PLAN NOTE
Patient experienced an episode of VT on 6/30 and experienced an ICD shock thought to be related to hypokalemia (K of 3.1 on 6/30)  - Aggressively replete K, keep K>4 and Mg>2  - Continue mexiletine PO and amio 200mg.   - EP signed off and recommending Amiodarone and Mexilitine,  - Continue to monitor on telemetry  - EP consulted

## 2022-08-27 NOTE — NURSING
"LVAD dressing change completed using sterile technique with kit. DLES is a "2" with moderate sanguineous drainage noted on the drain sponge. Tolerated without any complication. Sutures remain intact, no redness, or tenderness noted.    "

## 2022-08-28 PROBLEM — N18.32 STAGE 3B CHRONIC KIDNEY DISEASE: Status: ACTIVE | Noted: 2022-08-28

## 2022-08-28 PROBLEM — I10 ESSENTIAL HYPERTENSION: Status: ACTIVE | Noted: 2022-08-28

## 2022-08-28 PROBLEM — Z79.01 ANTICOAGULATION MONITORING, INR RANGE 2-3: Status: ACTIVE | Noted: 2022-08-28

## 2022-08-28 PROBLEM — I13.0 HYPERTENSIVE CARDIOVASCULAR-RENAL DISEASE, STAGE 1-4 OR UNSPECIFIED CHRONIC KIDNEY DISEASE, WITH HEART FAILURE: Status: ACTIVE | Noted: 2022-08-28

## 2022-08-28 PROBLEM — Z95.811 LVAD (LEFT VENTRICULAR ASSIST DEVICE) PRESENT: Status: ACTIVE | Noted: 2022-08-28

## 2022-08-28 PROBLEM — I42.0 DILATED CARDIOMYOPATHY: Status: ACTIVE | Noted: 2022-08-28

## 2022-08-28 PROBLEM — Z79.899 HIGH RISK MEDICATIONS (NOT ANTICOAGULANTS) LONG-TERM USE: Status: ACTIVE | Noted: 2022-08-28

## 2022-08-28 PROBLEM — I10 ESSENTIAL HYPERTENSION: Status: RESOLVED | Noted: 2022-08-28 | Resolved: 2022-08-28

## 2022-08-28 LAB
ANION GAP SERPL CALC-SCNC: 5 MMOL/L (ref 8–16)
APTT BLDCRRT: 39.3 SEC (ref 21–32)
APTT BLDCRRT: 40.7 SEC (ref 21–32)
BASOPHILS # BLD AUTO: 0 K/UL (ref 0–0.2)
BASOPHILS NFR BLD: 0 % (ref 0–1.9)
BUN SERPL-MCNC: 15 MG/DL (ref 6–20)
CALCIUM SERPL-MCNC: 8.9 MG/DL (ref 8.7–10.5)
CHLORIDE SERPL-SCNC: 100 MMOL/L (ref 95–110)
CO2 SERPL-SCNC: 29 MMOL/L (ref 23–29)
CREAT SERPL-MCNC: 1 MG/DL (ref 0.5–1.4)
DIFFERENTIAL METHOD: ABNORMAL
EOSINOPHIL # BLD AUTO: 0.1 K/UL (ref 0–0.5)
EOSINOPHIL NFR BLD: 1.2 % (ref 0–8)
ERYTHROCYTE [DISTWIDTH] IN BLOOD BY AUTOMATED COUNT: 17.4 % (ref 11.5–14.5)
EST. GFR  (NO RACE VARIABLE): >60 ML/MIN/1.73 M^2
GLUCOSE SERPL-MCNC: 91 MG/DL (ref 70–110)
HCT VFR BLD AUTO: 26.7 % (ref 40–54)
HGB BLD-MCNC: 7.1 G/DL (ref 14–18)
HGB BLD-MCNC: 7.8 G/DL (ref 14–18)
IMM GRANULOCYTES # BLD AUTO: 0.03 K/UL (ref 0–0.04)
IMM GRANULOCYTES NFR BLD AUTO: 0.4 % (ref 0–0.5)
INR PPP: 1.5 (ref 0.8–1.2)
LDH SERPL L TO P-CCNC: 229 U/L (ref 110–260)
LYMPHOCYTES # BLD AUTO: 0.6 K/UL (ref 1–4.8)
LYMPHOCYTES NFR BLD: 8.7 % (ref 18–48)
MAGNESIUM SERPL-MCNC: 2 MG/DL (ref 1.6–2.6)
MCH RBC QN AUTO: 28 PG (ref 27–31)
MCHC RBC AUTO-ENTMCNC: 29.2 G/DL (ref 32–36)
MCV RBC AUTO: 96 FL (ref 82–98)
MONOCYTES # BLD AUTO: 0.6 K/UL (ref 0.3–1)
MONOCYTES NFR BLD: 8.1 % (ref 4–15)
NEUTROPHILS # BLD AUTO: 5.5 K/UL (ref 1.8–7.7)
NEUTROPHILS NFR BLD: 81.6 % (ref 38–73)
NRBC BLD-RTO: 0 /100 WBC
PHOSPHATE SERPL-MCNC: 3.6 MG/DL (ref 2.7–4.5)
PLATELET # BLD AUTO: 174 K/UL (ref 150–450)
PMV BLD AUTO: 10.4 FL (ref 9.2–12.9)
POTASSIUM SERPL-SCNC: 4.8 MMOL/L (ref 3.5–5.1)
PROTHROMBIN TIME: 15.1 SEC (ref 9–12.5)
RBC # BLD AUTO: 2.79 M/UL (ref 4.6–6.2)
SODIUM SERPL-SCNC: 134 MMOL/L (ref 136–145)
WBC # BLD AUTO: 6.79 K/UL (ref 3.9–12.7)

## 2022-08-28 PROCEDURE — 99231 PR SUBSEQUENT HOSPITAL CARE,LEVL I: ICD-10-PCS | Mod: ,,, | Performed by: INTERNAL MEDICINE

## 2022-08-28 PROCEDURE — 85025 COMPLETE CBC W/AUTO DIFF WBC: CPT | Performed by: STUDENT IN AN ORGANIZED HEALTH CARE EDUCATION/TRAINING PROGRAM

## 2022-08-28 PROCEDURE — 99900026 HC AIRWAY MAINTENANCE (STAT)

## 2022-08-28 PROCEDURE — 85610 PROTHROMBIN TIME: CPT | Performed by: STUDENT IN AN ORGANIZED HEALTH CARE EDUCATION/TRAINING PROGRAM

## 2022-08-28 PROCEDURE — 99233 SBSQ HOSP IP/OBS HIGH 50: CPT | Mod: 25,,, | Performed by: INTERNAL MEDICINE

## 2022-08-28 PROCEDURE — 20600001 HC STEP DOWN PRIVATE ROOM

## 2022-08-28 PROCEDURE — 94640 AIRWAY INHALATION TREATMENT: CPT

## 2022-08-28 PROCEDURE — 94761 N-INVAS EAR/PLS OXIMETRY MLT: CPT

## 2022-08-28 PROCEDURE — 83615 LACTATE (LD) (LDH) ENZYME: CPT | Performed by: INTERNAL MEDICINE

## 2022-08-28 PROCEDURE — 25000003 PHARM REV CODE 250: Performed by: INTERNAL MEDICINE

## 2022-08-28 PROCEDURE — 93750 INTERROGATION VAD IN PERSON: CPT | Mod: ,,, | Performed by: INTERNAL MEDICINE

## 2022-08-28 PROCEDURE — 63600175 PHARM REV CODE 636 W HCPCS: Performed by: STUDENT IN AN ORGANIZED HEALTH CARE EDUCATION/TRAINING PROGRAM

## 2022-08-28 PROCEDURE — 27000221 HC OXYGEN, UP TO 24 HOURS

## 2022-08-28 PROCEDURE — 99233 PR SUBSEQUENT HOSPITAL CARE,LEVL III: ICD-10-PCS | Mod: 25,,, | Performed by: INTERNAL MEDICINE

## 2022-08-28 PROCEDURE — 99900035 HC TECH TIME PER 15 MIN (STAT)

## 2022-08-28 PROCEDURE — 25000003 PHARM REV CODE 250: Performed by: STUDENT IN AN ORGANIZED HEALTH CARE EDUCATION/TRAINING PROGRAM

## 2022-08-28 PROCEDURE — 80048 BASIC METABOLIC PNL TOTAL CA: CPT | Performed by: INTERNAL MEDICINE

## 2022-08-28 PROCEDURE — 83735 ASSAY OF MAGNESIUM: CPT | Performed by: STUDENT IN AN ORGANIZED HEALTH CARE EDUCATION/TRAINING PROGRAM

## 2022-08-28 PROCEDURE — 93750 PR INTERROGATE VENT ASSIST DEV, IN PERSON, W PHYSICIAN ANALYSIS: ICD-10-PCS | Mod: ,,, | Performed by: INTERNAL MEDICINE

## 2022-08-28 PROCEDURE — 25000242 PHARM REV CODE 250 ALT 637 W/ HCPCS

## 2022-08-28 PROCEDURE — 85018 HEMOGLOBIN: CPT | Performed by: INTERNAL MEDICINE

## 2022-08-28 PROCEDURE — 85730 THROMBOPLASTIN TIME PARTIAL: CPT | Mod: 91 | Performed by: INTERNAL MEDICINE

## 2022-08-28 PROCEDURE — 27000248 HC VAD-ADDITIONAL DAY

## 2022-08-28 PROCEDURE — 99231 SBSQ HOSP IP/OBS SF/LOW 25: CPT | Mod: ,,, | Performed by: INTERNAL MEDICINE

## 2022-08-28 PROCEDURE — 85730 THROMBOPLASTIN TIME PARTIAL: CPT | Performed by: INTERNAL MEDICINE

## 2022-08-28 PROCEDURE — 84100 ASSAY OF PHOSPHORUS: CPT | Performed by: STUDENT IN AN ORGANIZED HEALTH CARE EDUCATION/TRAINING PROGRAM

## 2022-08-28 RX ORDER — AMIODARONE HYDROCHLORIDE 200 MG/1
400 TABLET ORAL DAILY
Status: DISCONTINUED | OUTPATIENT
Start: 2022-08-28 | End: 2022-09-01 | Stop reason: HOSPADM

## 2022-08-28 RX ORDER — MEXILETINE HYDROCHLORIDE 250 MG/1
250 CAPSULE ORAL EVERY 8 HOURS
Status: DISCONTINUED | OUTPATIENT
Start: 2022-08-28 | End: 2022-09-01 | Stop reason: HOSPADM

## 2022-08-28 RX ADMIN — AMIODARONE HYDROCHLORIDE 400 MG: 200 TABLET ORAL at 08:08

## 2022-08-28 RX ADMIN — WARFARIN SODIUM 7.5 MG: 7.5 TABLET ORAL at 04:08

## 2022-08-28 RX ADMIN — HEPARIN SODIUM 15 UNITS/KG/HR: 5000 INJECTION INTRAVENOUS; SUBCUTANEOUS at 06:08

## 2022-08-28 RX ADMIN — HEPARIN SODIUM 15 UNITS/KG/HR: 5000 INJECTION INTRAVENOUS; SUBCUTANEOUS at 03:08

## 2022-08-28 RX ADMIN — LEVOTHYROXINE SODIUM 50 MCG: 50 TABLET ORAL at 07:08

## 2022-08-28 RX ADMIN — LEVALBUTEROL HYDROCHLORIDE 0.63 MG: 0.63 SOLUTION RESPIRATORY (INHALATION) at 02:08

## 2022-08-28 RX ADMIN — MEXILETINE HYDROCHLORIDE 250 MG: 250 CAPSULE ORAL at 07:08

## 2022-08-28 RX ADMIN — PANTOPRAZOLE SODIUM 40 MG: 40 TABLET, DELAYED RELEASE ORAL at 08:08

## 2022-08-28 RX ADMIN — POLYETHYLENE GLYCOL 3350 17 G: 17 POWDER, FOR SOLUTION ORAL at 08:08

## 2022-08-28 RX ADMIN — ASPIRIN 81 MG CHEWABLE TABLET 81 MG: 81 TABLET CHEWABLE at 08:08

## 2022-08-28 RX ADMIN — MEXILETINE HYDROCHLORIDE 250 MG: 250 CAPSULE ORAL at 09:08

## 2022-08-28 RX ADMIN — PREDNISONE 10 MG: 10 TABLET ORAL at 08:08

## 2022-08-28 RX ADMIN — Medication 400 MG: at 09:08

## 2022-08-28 RX ADMIN — ALPRAZOLAM 1 MG: 0.5 TABLET ORAL at 09:08

## 2022-08-28 RX ADMIN — SENNOSIDES AND DOCUSATE SODIUM 1 TABLET: 50; 8.6 TABLET ORAL at 08:08

## 2022-08-28 RX ADMIN — LEVALBUTEROL HYDROCHLORIDE 0.63 MG: 0.63 SOLUTION RESPIRATORY (INHALATION) at 08:08

## 2022-08-28 RX ADMIN — LEVALBUTEROL HYDROCHLORIDE 0.63 MG: 0.63 SOLUTION RESPIRATORY (INHALATION) at 12:08

## 2022-08-28 RX ADMIN — Medication 400 MG: at 08:08

## 2022-08-28 RX ADMIN — LEVALBUTEROL HYDROCHLORIDE 0.63 MG: 0.63 SOLUTION RESPIRATORY (INHALATION) at 07:08

## 2022-08-28 RX ADMIN — MEXILETINE HYDROCHLORIDE 250 MG: 250 CAPSULE ORAL at 03:08

## 2022-08-28 RX ADMIN — MIRTAZAPINE 15 MG: 15 TABLET, FILM COATED ORAL at 09:08

## 2022-08-28 NOTE — PROCEDURES
"Tim Richards is a 56 y.o. male patient.    Temp: 98.8 °F (37.1 °C) (08/28/22 1541)  Pulse: 79 (08/28/22 1543)  Resp: 20 (08/28/22 1543)  BP: (!) 72/0 (08/28/22 1541)  SpO2: 96 % (08/28/22 1543)  Weight: 93.7 kg (206 lb 9.1 oz) (08/28/22 0336)  Height: 6' 1" (185.4 cm) (08/24/22 1200)    Procedures    TXP LVAD INTERROGATIONS 8/28/2022 8/28/2022 8/28/2022 8/28/2022 8/27/2022 8/27/2022 8/27/2022   Type HeartMate3 HeartMate3 HeartMate3 HeartMate3 HeartMate3 HeartMate3 HeartMate3   Flow 5.5 5.4 5.2 5.3 5.3 5.3 5.2   Speed 6300 6300 6300 6300 6300 6300 6300   PI 1.7 2.3 3.7 3.4 3.9 3.6 3.8   Power (Jackson) 5.3 5.4 5.3 5.3 5.2 5.4 5.2   LSL - - - - - 5900 5900   Low Flow Alarm - - - - - no no   High Power Alarm - - - - - no no   Pulsatility - - No Pulse Intermittent pulse Intermittent pulse Intermittent pulse Intermittent pulse     Interrogation of Ventricular assist device was performed with physician analysis of device parameters and review of device function. I have personally reviewed the interrogation findings and agree with findings as stated.     8/28/2022    "

## 2022-08-28 NOTE — RESPIRATORY THERAPY
RAPID RESPONSE RESPIRATORY THERAPY PROACTIVE NOTE           Time of visit: 958     Code Status: Full Code   : 1965  Bed: 311/311 A:   MRN: [id number]  Time spent at the bedside: < 15 min    SITUATION    Evaluated patient for: LDA Check     BACKGROUND    Patient has a past medical history of A-fib, Anticoagulant long-term use, Atrial flutter, CHF (congestive heart failure), Class 1 obesity due to excess calories with serious comorbidity and body mass index (BMI) of 31.0 to 31.9 in adult, Dilated cardiomyopathy, Disorder of kidney and ureter, Encounter for blood transfusion, Gout, HTN (hypertension), psychiatric care, ICD (implantable cardioverter-defibrillator) infection, Psychiatric problem, Thyroid disease, and Ventricular tachycardia (paroxysmal).  Clinically Significant Surgical Hx: tracheostomy    24 Hours Vitals Range:  Temp:  [97.9 °F (36.6 °C)-98.4 °F (36.9 °C)]   Pulse:  [40-98]   Resp:  [14-24]   BP: (68-82)/(0)   SpO2:  [90 %-100 %]     Labs:    Recent Labs     22  0425 22  0511 22  0506   * 130* 134*   K 4.4 4.6 4.8   CL 98 98 100   CO2 26 26 29   CREATININE 0.9 1.1 1.0   GLU 82 81 91   PHOS 2.7 3.0 3.6   MG 2.0 2.1 2.0        No results for input(s): PH, PCO2, PO2, HCO3, POCSATURATED, BE in the last 72 hours.    ASSESSMENT/INTERVENTIONS  All supplies at bedside. No respiratory concerns at this time      Last VS   Temp: 98.1 °F (36.7 °C) (806)  Pulse: 81 ( 0455)  Resp: 20 (806)  BP: 72/0 (806)  SpO2: 90 % (806)      Extra trachs at bedside: 6.0 and 8.0 cuffed  Level of Consciousness: Level of Consciousness (AVPU): alert  Respiratory Effort: Respiratory Effort: Unlabored, Normal Expansion/Accessory Muscle Usage: Expansion/Accessory Muscles/Retractions: no use of accessory muscles, expansion symmetric  All Lung Field Breath Sounds: All Lung Fields Breath Sounds: coarse  SHERWIN Breath Sounds: Anterior:, Posterior:, Lateral:, clear  LLL Breath  Sounds: Anterior:, Posterior:, Lateral:, diminished  RUL Breath Sounds: Anterior:, Posterior:, Lateral:, diminished  RML Breath Sounds: Anterior:, Posterior:, Lateral:, diminished  RLL Breath Sounds: Anterior:, Posterior:, Lateral:, clear  O2 Device/Concentration: trach collar 10L 40%  Surgical airway: Yes, Type: Shiley Size: 8, cuffed  Ambu at bedside: Ambu bag with the patient?: Yes, Adult Ambu     Active Orders   Respiratory Care    Chest physiotherapy Q6H PRN     Frequency: Q6H PRN     Number of Occurrences: Until Specified     Order Questions:      Indications: COPIOUS SPUTUM PRODUCTION    Inhalation Treatment Q6H     Frequency: Q6H     Number of Occurrences: Until Specified    Oxygen Continuous     Frequency: Continuous     Number of Occurrences: Until Specified     Order Questions:      Device type: Low flow      Device: Trach Collar      FiO2%: 40      Titrate O2 per Oxygen Titration Protocol: Yes      To maintain SpO2 goal of: >= 90%      Notify MD of: Inability to achieve desired SpO2; Sudden change in patient status and requires 20% increase in FiO2; Patient requires >60% FiO2    Pulse Oximetry Q4H     Frequency: Q4H     Number of Occurrences: Until Specified    Respiratory care evaluation only Once     Frequency: Once     Number of Occurrences: 1 Occurrences     Order Comments: Consult for tracheostomy exchange/downsize      Routine tracheostomy care     Frequency: BID     Number of Occurrences: Until Specified       RECOMMENDATIONS    We recommend: RRT Recs: Continue POC per primary team.      FOLLOW-UP    Please call back the Rapid Response RT, Yenny Rao, RRT at x [phone] for any questions or concerns.

## 2022-08-28 NOTE — PROGRESS NOTES
08/28/2022  Estephania Martinez    Current provider:  Roslyn Ragsdale DO    Device interrogation:  TXP LVAD INTERROGATIONS 8/28/2022 8/28/2022 8/27/2022 8/27/2022 8/27/2022 8/27/2022 8/27/2022   Type HeartMate3 HeartMate3 HeartMate3 HeartMate3 HeartMate3 HeartMate3 HeartMate3   Flow 5.2 5.3 5.3 5.3 5.2 5.6 5.2   Speed 6300 6300 6300 6300 6300 6300 6300   PI 3.7 3.4 3.9 3.6 3.8 2.0 3.6   Power (Jackson) 5.3 5.3 5.2 5.4 5.2 5.3 5.3   LSL - - - 5900 5900 5900 5900   Low Flow Alarm - - - no no NO -   High Power Alarm - - - no no NO -   Pulsatility No Pulse Intermittent pulse Intermittent pulse Intermittent pulse Intermittent pulse Intermittent pulse -          Rounded on Tim Richards to ensure all mechanical assist device settings (IABP or VAD) were appropriate and all parameters were within limits.  I was able to ensure all back up equipment was present, the staff had no issues, and the Perfusion Department daily rounding was complete.      For implantable VADs: Interrogation of Ventricular assist device was performed with analysis of device parameters and review of device function. I have personally reviewed the interrogation findings and agree with findings as stated.     In emergency, the nursing units have been notified to contact the perfusion department either by:  Calling [phone] from 630am to 4pm Mon thru Fri, utilizing the On-Call Finder functionality of Epic and searching for Perfusion, or by contacting the hospital  from 4pm to 630am and on weekends and asking to speak with the perfusionist on call.    9:29 AM

## 2022-08-28 NOTE — SUBJECTIVE & OBJECTIVE
Interval History: No acute events overnight. Denies any more nausea or dizziness. Medications have been adjusted by EP. Continues to have oropharyngeal secretions.     Continuous Infusions:   dextrose 10 % in water (D10W)      heparin (porcine) in D5W 15 Units/kg/hr (08/28/22 0336)     Scheduled Meds:   amiodarone  400 mg Oral Daily    aspirin  81 mg Oral Daily    levalbuterol  0.63 mg Nebulization Q6H    levothyroxine  50 mcg Oral Before breakfast    magnesium oxide  400 mg Oral BID    mexiletine  250 mg Oral Q8H    mirtazapine  15 mg Oral QHS    ondansetron  4 mg Intravenous Once    pantoprazole  40 mg Oral Daily    polyethylene glycol  17 g Oral Daily    [START ON 8/29/2022] predniSONE  5 mg Oral Daily    senna-docusate 8.6-50 mg  1 tablet Oral Daily    warfarin  7.5 mg Oral Daily     PRN Meds:sodium chloride 0.9%, acetaminophen, ALPRAZolam, bisacodyL, dextrose 10 % in water (D10W), dextrose 10%, dextrose 10%, glucagon (human recombinant), glucose, guaiFENesin 100 mg/5 ml, HYDROmorphone, HYDROmorphone, hydrOXYzine HCL, meclizine, melatonin, ondansetron    Review of patient's allergies indicates:   Allergen Reactions    Lisinopril Anaphylaxis    Hydralazine analogues      Chronic constipation, impotence, dizziness     Objective:     Vital Signs (Most Recent):  Temp: 98.8 °F (37.1 °C) (08/28/22 1541)  Pulse: 79 (08/28/22 1543)  Resp: 20 (08/28/22 1543)  BP: (!) 72/0 (08/28/22 1541)  SpO2: 96 % (08/28/22 1543)   Vital Signs (24h Range):  Temp:  [98.1 °F (36.7 °C)-98.8 °F (37.1 °C)] 98.8 °F (37.1 °C)  Pulse:  [53-96] 79  Resp:  [16-24] 20  SpO2:  [90 %-100 %] 96 %  BP: (68-82)/(0) 72/0     Patient Vitals for the past 72 hrs (Last 3 readings):   Weight   08/28/22 0336 93.7 kg (206 lb 9.1 oz)   08/27/22 0500 94 kg (207 lb 3.7 oz)   08/26/22 0500 93.5 kg (206 lb 1.6 oz)       Body mass index is 27.25 kg/m².      Intake/Output Summary (Last 24 hours) at 8/28/2022 1801  Last data filed at 8/28/2022 1400  Gross per 24 hour    Intake 2305.39 ml   Output 1100 ml   Net 1205.39 ml         Hemodynamic Parameters:       Telemetry: No significant arrhythmic events overnight.    Physical Exam  Vitals and nursing note reviewed.   Constitutional:       General: He is not in acute distress.     Appearance: Normal appearance. He is not ill-appearing.   HENT:      Head: Normocephalic and atraumatic.      Ears:      Abel exam findings: Lateralizes left.     Right Rinne: AC > BC.     Left Rinne: AC > BC.     Nose: Nose normal.      Mouth/Throat:      Mouth: Mucous membranes are moist.      Comments: Tracheostomy site is clean and dry without drainage  Eyes:      Extraocular Movements: Extraocular movements intact.      Conjunctiva/sclera: Conjunctivae normal.      Pupils: Pupils are equal, round, and reactive to light.   Cardiovascular:      Rate and Rhythm: Normal rate.      Pulses: Normal pulses.      Heart sounds: Normal heart sounds.      Comments: VAD hum can be heard, no significant JVD noted on exam  Pulmonary:      Effort: Pulmonary effort is normal.      Breath sounds: Normal breath sounds. No stridor.   Abdominal:      General: Abdomen is flat. There is no distension.      Palpations: Abdomen is soft.      Tenderness: There is no abdominal tenderness. There is no guarding.   Musculoskeletal:         General: Normal range of motion.      Cervical back: No rigidity.   Lymphadenopathy:      Cervical: No cervical adenopathy.   Skin:     General: Skin is warm and dry.      Capillary Refill: Capillary refill takes less than 2 seconds.   Neurological:      General: No focal deficit present.      Mental Status: He is alert and oriented to person, place, and time.   Psychiatric:         Mood and Affect: Mood normal.         Behavior: Behavior normal.         Thought Content: Thought content normal.         Judgment: Judgment normal.       Significant Labs:  CBC:  Recent Labs   Lab 08/26/22  0425 08/27/22  0511 08/28/22  0506 08/28/22  0719   WBC  7.12 7.28 6.79  --    RBC 2.83* 2.88* 2.79*  --    HGB 7.7* 7.9* 7.8* 7.1*   HCT 27.5* 27.1* 26.7*  --     177 174  --    MCV 97 94 96  --    MCH 27.2 27.4 28.0  --    MCHC 28.0* 29.2* 29.2*  --        BNP:  Recent Labs   Lab 08/22/22  0510 08/24/22  0448 08/26/22  0425   * 568* 210*       CMP:  Recent Labs   Lab 08/22/22  0510 08/23/22  0434 08/24/22  0856 08/25/22  0338 08/26/22  0425 08/27/22  0511 08/28/22  0506   GLU 69*   < > 68* 93 82 81 91   CALCIUM 8.9   < > 8.7 8.9 8.8 9.0 8.9   ALBUMIN 2.1*  --  2.2* 2.1*  --   --   --    PROT 5.9*  --  5.9* 5.6*  --   --   --    *   < > 132* 134* 132* 130* 134*   K 4.7   < > 4.5 4.7 4.4 4.6 4.8   CO2 26   < > 28 28 26 26 29      < > 99 100 98 98 100   BUN 21*   < > 17 20 18 16 15   CREATININE 1.1   < > 1.1 1.2 0.9 1.1 1.0   ALKPHOS 182*  --  166* 159*  --   --   --    ALT 58*  --  56* 53*  --   --   --    AST 34  --  32 29  --   --   --    BILITOT 1.6*  --  1.3* 1.2*  --   --   --     < > = values in this interval not displayed.        Coagulation:   Recent Labs   Lab 08/26/22  0426 08/26/22  0556 08/27/22  0511 08/27/22  1208 08/27/22  1854 08/28/22  0215 08/28/22  0506   INR 1.4*  --  1.5*  --   --   --  1.5*   APTT 80.3*   < > 54.9*   < > 39.0* 39.3* 40.7*    < > = values in this interval not displayed.       LDH:  Recent Labs   Lab 08/26/22  0426 08/27/22  0511 08/28/22  0506   * 263* 229       Microbiology:  Microbiology Results (last 7 days)       ** No results found for the last 168 hours. **            I have reviewed all pertinent labs within the past 24 hours.    Estimated Creatinine Clearance: 94.3 mL/min (based on SCr of 1 mg/dL).    Diagnostic Results:  I have reviewed all pertinent imaging results/findings within the past 24 hours.

## 2022-08-28 NOTE — PLAN OF CARE
Problem: Adult Inpatient Plan of Care  Goal: Patient-Specific Goal (Individualized)  Outcome: Ongoing, Progressing  Flowsheets (Taken 8/28/2022 6933)  Anxieties, Fears or Concerns: None voiced  Individualized Care Needs:   MOnitor LVAD#. Perform LVAD dressing   due 08/29. MOnitor SPO2. Perform Trach care/suction. Monitor aptt for Heparin gtt infusing @ 15 u/kg/hr.Maintain BG protocol.  Patient-Specific Goals (Include Timeframe): Pt will be free of falss and injuries throughout my shift.    Plan of care discussed with patient.  Patient ambulating independently, fall precautions in place. LVAD DP and numbers WNL, smooth LVAD hum. Patient has no complaints of pain. Discussed medications and care.  Patient has no questions at this time. Will continue to monitor throughout my shift.

## 2022-08-28 NOTE — PROGRESS NOTES
08/27/2022  Estephania Martinez    Current provider:  Roslyn Ragsdale DO    Device interrogation:  TXP LVAD INTERROGATIONS 8/27/2022 8/27/2022 8/27/2022 8/27/2022 8/27/2022 8/26/2022 8/26/2022   Type HeartMate3 HeartMate3 HeartMate3 HeartMate3 HeartMate3 HeartMate3 HeartMate3   Flow 5.3 5.3 5.2 5.6 5.2 5.3 5.3   Speed 6300 6300 6300 6300 6300 6300 6300   PI 3.9 3.6 3.8 2.0 3.6 3.4 2.5   Power (Jackson) 5.2 5.4 5.2 5.3 5.3 5.4 5.3   LSL - 5900 5900 5900 5900 5900 5900   Low Flow Alarm - no no NO - - -   High Power Alarm - no no NO - - -   Pulsatility Intermittent pulse Intermittent pulse Intermittent pulse Intermittent pulse - Pulse No Pulse          Rounded on Tim Richards to ensure all mechanical assist device settings (IABP or VAD) were appropriate and all parameters were within limits.  I was able to ensure all back up equipment was present, the staff had no issues, and the Perfusion Department daily rounding was complete.      For implantable VADs: Interrogation of Ventricular assist device was performed with analysis of device parameters and review of device function. I have personally reviewed the interrogation findings and agree with findings as stated.     In emergency, the nursing units have been notified to contact the perfusion department either by:  Calling [email or web address] from 630am to 4pm Mon thru Fri, utilizing the On-Call Finder functionality of Epic and searching for Perfusion, or by contacting the hospital  from 4pm to 630am and on weekends and asking to speak with the perfusionist on call.    10:05 PM

## 2022-08-28 NOTE — ASSESSMENT & PLAN NOTE
Significant VT hx previously controlled with amio 400mg q d.   EP consulted for mexiletine dosing as pt was having significant nausea and dizziness.   -Meds changed to mexiletine 250mg TID  and amio increased to 400mg.

## 2022-08-28 NOTE — PROCEDURES
"Tim Richards is a 56 y.o. male patient.    Temp: 98.4 °F (36.9 °C) (08/27/22 2033)  Pulse: 71 (08/27/22 2247)  Resp: (!) 24 (08/27/22 2033)  BP: (!) 82/0 (08/27/22 2033)  SpO2: 100 % (08/27/22 2033)  Weight: 94 kg (207 lb 3.7 oz) (08/27/22 0500)  Height: 6' 1" (185.4 cm) (08/24/22 1200)    Procedures    TXP LVAD INTERROGATIONS 8/27/2022 8/27/2022 8/27/2022 8/27/2022 8/27/2022 8/26/2022 8/26/2022   Type HeartMate3 HeartMate3 HeartMate3 HeartMate3 HeartMate3 HeartMate3 HeartMate3   Flow 5.3 5.3 5.2 5.6 5.2 5.3 5.3   Speed 6300 6300 6300 6300 6300 6300 6300   PI 3.9 3.6 3.8 2.0 3.6 3.4 2.5   Power (Jackson) 5.2 5.4 5.2 5.3 5.3 5.4 5.3   LSL - 5900 5900 5900 5900 5900 5900   Low Flow Alarm - no no NO - - -   High Power Alarm - no no NO - - -   Pulsatility Intermittent pulse Intermittent pulse Intermittent pulse Intermittent pulse - Pulse No Pulse     Interrogation of Ventricular assist device was performed with physician analysis of device parameters and review of device function. I have personally reviewed the interrogation findings and agree with findings as stated.       8/27/2022    "

## 2022-08-28 NOTE — SUBJECTIVE & OBJECTIVE
Past Medical History:   Diagnosis Date    A-fib     Anticoagulant long-term use     Atrial flutter 7/30/2022    CHF (congestive heart failure)     Class 1 obesity due to excess calories with serious comorbidity and body mass index (BMI) of 31.0 to 31.9 in adult     Dilated cardiomyopathy 1/10/2018    Disorder of kidney and ureter     CKD    Encounter for blood transfusion     Gout     HTN (hypertension)     Hx of psychiatric care     ICD (implantable cardioverter-defibrillator) infection 7/1/2020    Psychiatric problem     Thyroid disease     Ventricular tachycardia (paroxysmal)        Past Surgical History:   Procedure Laterality Date    AORTIC VALVULOPLASTY N/A 7/13/2022    Procedure: REPAIR, AORTIC VALVE;  Surgeon: Yg Kaufman MD;  [facility];  Service: Cardiovascular;  Laterality: N/A;    APPLICATION OF WOUND VACUUM-ASSISTED CLOSURE DEVICE N/A 7/15/2022    Procedure: APPLICATION, WOUND VAC;  Surgeon: Yg Kaufman MD;  [facility];  Service: Cardiovascular;  Laterality: N/A;  50 x 5 cm     CARDIAC CATHETERIZATION  Dec. 2012    CARDIAC DEFIBRILLATOR PLACEMENT Left     CRRT-D    COLONOSCOPY N/A 3/6/2018    Procedure: COLONOSCOPY;  Surgeon: Alonso Bone MD;  Location: [facility] (2ND FLR);  Service: Endoscopy;  Laterality: N/A;    COLONOSCOPY N/A 7/17/2019    Procedure: COLONOSCOPY;  Surgeon: Blane Valdez MD;  Location: [facility] (2ND FLR);  Service: Endoscopy;  Laterality: N/A;    COLONOSCOPY N/A 7/18/2019    Procedure: COLONOSCOPY;  Surgeon: Blane Valdez MD;  Location: [facility] (2ND FLR);  Service: Endoscopy;  Laterality: N/A;    ESOPHAGOGASTRODUODENOSCOPY N/A 7/17/2019    Procedure: EGD (ESOPHAGOGASTRODUODENOSCOPY);  Surgeon: Blane Valdez MD;  Location: [facility] ENDO (2ND FLR);  Service: Endoscopy;  Laterality: N/A;    ESOPHAGOGASTRODUODENOSCOPY N/A 7/18/2019    Procedure: EGD (ESOPHAGOGASTRODUODENOSCOPY);  Surgeon: Blane Valdez MD;  Location: [facility] ENDO (2ND FLR);  Service: Endoscopy;   Laterality: N/A;    FOREIGN BODY REMOVAL N/A 7/22/2022    Procedure: REMOVAL, FOREIGN BODY;  Surgeon: Yg Kaufman MD;  [facility];  Service: Cardiovascular;  Laterality: N/A;  LVAD Heartmate 2 drive line removal    INSERTION OF GRAFT TO PERICARDIUM N/A 7/15/2022    Procedure: INSERTION, GRAFT, PERICARDIUM;  Surgeon: Yg Kaufman MD;  [facility];  Service: Cardiovascular;  Laterality: N/A;    IRRIGATION OF MEDIASTINUM N/A 7/15/2022    Procedure: IRRIGATION, MEDIASTINUM;  Surgeon: Yg Kaufman MD;  Location: [facility] OR [facility] FLR;  Service: Cardiovascular;  Laterality: N/A;    LYSIS OF ADHESIONS  7/13/2022    Procedure: LYSIS, ADHESIONS;  Surgeon: Yg Kaufman MD;  [facility];  Service: Cardiovascular;;    NONINVASIVE CARDIAC ELECTROPHYSIOLOGY STUDY N/A 10/18/2019    Procedure: CARDIAC ELECTROPHYSIOLOGY STUDY, NONINVASIVE;  Surgeon: Raz Wagner MD;  Location: [facility] EP LAB;  Service: Cardiology;  Laterality: N/A;  VT, DFTs, MDT CRTD in situ, LVAD, juarez, MB, 3098    REPLACEMENT OF IMPLANTABLE CARDIOVERTER-DEFIBRILLATOR (ICD) GENERATOR N/A 3/9/2020    Procedure: REPLACEMENT, ICD GENERATOR;  Surgeon: Harry Yun MD;  Location: [facility] EP LAB;  Service: Cardiology;  Laterality: N/A;  VT, ICD Gen Change and Lead Revision, MDT, MAC, DM,3 Prep    REPLACEMENT OF LEFT VENTRICULAR ASSIST DEVICE (LVAD)  7/13/2022    Procedure: REPLACEMENT, LVAD;  Surgeon: Yg Kaufman MD;  [facility];  Service: Cardiovascular;;    REPLACEMENT OF PUMP N/A 7/13/2022    Procedure: REPLACEMENT, PUMP;  Surgeon: Yg Kaufman MD;  [facility];  Service: Cardiovascular;  Laterality: N/A;  LVAD pump exchange  EXPLANATION OF HEATMATE 2  IMPLANTATION OF HEARTMATE 3  IMPLANTATION OF 8MM CHIMNEY GRAFT TO RFA  INITIATION OF ECMO  TEMPORARY CLOSURE OF CHEST    REVISION OF IMPLANTABLE CARDIOVERTER-DEFIBRILLATOR (ICD) ELECTRODE LEAD PLACEMENT N/A 3/9/2020    Procedure:  REVISION, INSERTION, ELECTRODE LEAD, ICD;  Surgeon: Harry Yun MD;  Location: [facility] EP LAB;  Service: Cardiology;  Laterality: N/A;  VT, ICD Gen Change and Lead Revision, MDT, MAC, DM,3 Prep    STERNAL WOUND CLOSURE N/A 7/14/2022    Procedure: CLOSURE, WOUND, STERNUM;  Surgeon: Yg Kaufman MD;  Location: [facility] OR [facility] FLR;  Service: Cardiovascular;  Laterality: N/A;  temporary closure  evacuation of hematoma    STERNAL WOUND CLOSURE N/A 7/15/2022    Procedure: CLOSURE, WOUND, STERNUM;  Surgeon: Yg Kaufman MD;  Location: [facility] OR [facility] FLR;  Service: Cardiovascular;  Laterality: N/A;    TRACHEOSTOMY N/A 8/4/2022    Procedure: CREATION, TRACHEOSTOMY;  Surgeon: Germain Holt MD;  [facility];  Service: General;  Laterality: N/A;    TREATMENT OF CARDIAC ARRHYTHMIA  10/18/2019    Procedure: Cardioversion or Defibrillation;  Surgeon: Raz Wagner MD;  Location: [facility] EP LAB;  Service: Cardiology;;       Review of patient's allergies indicates:   Allergen Reactions    Lisinopril Anaphylaxis    Hydralazine analogues      Chronic constipation, impotence, dizziness       No current facility-administered medications on file prior to encounter.     Current Outpatient Medications on File Prior to Encounter   Medication Sig    allopurinoL (ZYLOPRIM) 100 MG tablet TAKE 1 TABLET (100 MG) BY MOUTH NIGHTLY.    amiodarone (PACERONE) 200 MG Tab Take 2 tablets (400 mg total) by mouth once daily.    aspirin (ECOTRIN) 81 MG EC tablet Take 1 tablet (81 mg total) by mouth once daily.    busPIRone (BUSPAR) 5 MG Tab Take 1 tablet (5 mg total) by mouth 3 (three) times daily.    levothyroxine (SYNTHROID) 112 MCG tablet Take 1 tablet (112 mcg total) by mouth before breakfast.    mexiletine (MEXITIL) 250 MG Cap Take 1 capsule (250 mg total) by mouth every 8 (eight) hours.    pantoprazole (PROTONIX) 40 MG tablet TAKE 1 TABLET (40 MG TOTAL) BY MOUTH DAILY WITH LUNCH.    warfarin (COUMADIN) 5 MG tablet TAKE 7.5 MG  ORALLY DAILY EVERY  AND THURSDAY, TAKE 5 MG ORALLY DAILY ON OTHER DAYS    [DISCONTINUED] ferrous gluconate (FERGON) 324 MG tablet TAKE 1 TABLET (324 MG TOTAL) BY MOUTH WITH LUNCH.    [DISCONTINUED] sildenafiL (VIAGRA) 100 MG tablet Take 1 tablet (100 mg total) by mouth daily as needed for Erectile Dysfunction.     Family History       Problem Relation (Age of Onset)    Cancer Sister (54)    Coronary artery disease Father    Diabetes Father    Heart disease Father    Hypertension Father    No Known Problems Mother, Brother          Tobacco Use    Smoking status: Former     Packs/day: 1.00     Years: 31.00     Pack years: 31.00     Types: Cigarettes     Quit date: 2018     Years since quittin.6    Smokeless tobacco: Never    Tobacco comments:     pt is quiting on his own - pt stated not qualified for program;  pt  quit on his own   Substance and Sexual Activity    Alcohol use: No     Alcohol/week: 0.0 standard drinks     Comment: quit    Drug use: No    Sexual activity: Yes     Partners: Female     Birth control/protection: None     Comment: 10/5/17  with same partner 7 years      Review of Systems   Constitutional: Positive for decreased appetite and malaise/fatigue. Negative for fever.   HENT:  Negative for ear pain.    Eyes:  Negative for double vision.   Cardiovascular:  Negative for chest pain and palpitations.   Respiratory:  Negative for shortness of breath.    Endocrine: Negative for heat intolerance.   Hematologic/Lymphatic: Negative for adenopathy.   Skin:  Negative for dry skin.   Musculoskeletal:  Negative for falls.   Gastrointestinal:  Negative for anorexia.   Genitourinary:  Negative for flank pain.   Neurological:  Positive for dizziness. Negative for disturbances in coordination.   Psychiatric/Behavioral:  Negative for depression.    Objective:     Vital Signs (Most Recent):  Temp: 98.3 °F (36.8 °C) (22)  Pulse: 74 (22)  Resp: 16 (22)  BP: (!)  70/0 (08/28/22 0006)  SpO2: 99 % (08/28/22 0006)   Vital Signs (24h Range):  Temp:  [97.1 °F (36.2 °C)-98.5 °F (36.9 °C)] 98.3 °F (36.8 °C)  Pulse:  [40-98] 74  Resp:  [14-24] 16  SpO2:  [94 %-100 %] 99 %  BP: (68-82)/(0) 70/0     Weight: 94 kg (207 lb 3.7 oz)  Body mass index is 27.34 kg/m².    SpO2: 99 %  O2 Device (Oxygen Therapy): Trach Collar      Intake/Output Summary (Last 24 hours) at 8/28/2022 0025  Last data filed at 8/27/2022 2045  Gross per 24 hour   Intake 2787.39 ml   Output 1630 ml   Net 1157.39 ml       Lines/Drains/Airways       Airway  Duration                  Surgical Airway 08/04/22 Shiley Cuffed 24 days              Line  Duration                  VAD 07/13/22 1300 Left ventricular assist device HeartMate 3 45 days              Peripheral Intravenous Line  Duration                  Peripheral IV - Single Lumen 08/22/22 1830 20 G;1 3/4 in Anterior;Left Forearm 5 days         Midline Catheter Insertion/Assessment  - Single Lumen 08/25/22 1056 Left brachial vein 20g x 8cm 2 days                    Physical Exam  Constitutional:       General: He is not in acute distress.     Appearance: He is ill-appearing.   HENT:      Head: Normocephalic and atraumatic.      Nose: No congestion.      Mouth/Throat:      Mouth: Mucous membranes are moist.   Eyes:      Extraocular Movements: Extraocular movements intact.      Conjunctiva/sclera: Conjunctivae normal.   Cardiovascular:      Rate and Rhythm: NSR with rate 70s, no ectopy on telemetry review     Comments: Vad hum present   Pulmonary:      Effort: Pulmonary effort is normal. No respiratory distress.   Abdominal:      General: Abdomen is flat.   Musculoskeletal:      Cervical back: Normal range of motion.      Right lower leg: No edema.      Left lower leg: No edema.   Skin:     General: Skin is warm.      Capillary Refill: Capillary refill takes less than 2 seconds.      Findings: No rash.   Neurological:      Mental Status: He is alert.

## 2022-08-28 NOTE — RESPIRATORY THERAPY
RAPID RESPONSE RESPIRATORY THERAPY PROACTIVE NOTE           Time of visit: 0758     Code Status: Full Code   : 1965  Bed: 311/311 A:   MRN: [id number]  Time spent at the bedside: < 15 min    SITUATION    Evaluated patient for: LDA Check     BACKGROUND    Patient has a past medical history of A-fib, Anticoagulant long-term use, Atrial flutter, CHF (congestive heart failure), Class 1 obesity due to excess calories with serious comorbidity and body mass index (BMI) of 31.0 to 31.9 in adult, Dilated cardiomyopathy, Disorder of kidney and ureter, Encounter for blood transfusion, Gout, HTN (hypertension), psychiatric care, ICD (implantable cardioverter-defibrillator) infection, Psychiatric problem, Thyroid disease, and Ventricular tachycardia (paroxysmal).  Clinically Significant Surgical Hx: tracheostomy    24 Hours Vitals Range:  Temp:  [97.9 °F (36.6 °C)-98.4 °F (36.9 °C)]   Pulse:  [40-98]   Resp:  [14-24]   BP: (68-82)/(0)   SpO2:  [90 %-100 %]     Labs:    Recent Labs     22  0425 22  0511 22  0506   * 130* 134*   K 4.4 4.6 4.8   CL 98 98 100   CO2 26 26 29   CREATININE 0.9 1.1 1.0   GLU 82 81 91   PHOS 2.7 3.0 3.6   MG 2.0 2.1 2.0        No results for input(s): PH, PCO2, PO2, HCO3, POCSATURATED, BE in the last 72 hours.    ASSESSMENT/INTERVENTIONS  No resp concerns at this time. All supplies at bedside      Last VS   Temp: 98.1 °F (36.7 °C) (806)  Pulse: 81 ( 0455)  Resp: 20 (806)  BP: 72/0 (806)  SpO2: 90 % (806)      Extra trachs at bedside: 6.0 & 8.0 cuffed  Level of Consciousness: Level of Consciousness (AVPU): alert  Respiratory Effort: Respiratory Effort: Unlabored, Normal Expansion/Accessory Muscle Usage: Expansion/Accessory Muscles/Retractions: no use of accessory muscles, expansion symmetric  All Lung Field Breath Sounds: All Lung Fields Breath Sounds: coarse  SHERWIN Breath Sounds: Anterior:, Posterior:, Lateral:, clear  LLL Breath Sounds:  Anterior:, Posterior:, Lateral:, diminished  RUL Breath Sounds: Anterior:, Posterior:, Lateral:, diminished  RML Breath Sounds: Anterior:, Posterior:, Lateral:, diminished  RLL Breath Sounds: Anterior:, Posterior:, Lateral:, clear  O2 Device/Concentration: 10L 40% Trach Collar  Surgical airway: Yes, Type: Shiley Size: 8, cuffed  Ambu at bedside: Ambu bag with the patient?: Yes, Adult Ambu     Active Orders   Respiratory Care    Chest physiotherapy Q6H PRN     Frequency: Q6H PRN     Number of Occurrences: Until Specified     Order Questions:      Indications: COPIOUS SPUTUM PRODUCTION    Inhalation Treatment Q6H     Frequency: Q6H     Number of Occurrences: Until Specified    Oxygen Continuous     Frequency: Continuous     Number of Occurrences: Until Specified     Order Questions:      Device type: Low flow      Device: Trach Collar      FiO2%: 40      Titrate O2 per Oxygen Titration Protocol: Yes      To maintain SpO2 goal of: >= 90%      Notify MD of: Inability to achieve desired SpO2; Sudden change in patient status and requires 20% increase in FiO2; Patient requires >60% FiO2    Pulse Oximetry Q4H     Frequency: Q4H     Number of Occurrences: Until Specified    Respiratory care evaluation only Once     Frequency: Once     Number of Occurrences: 1 Occurrences     Order Comments: Consult for tracheostomy exchange/downsize      Routine tracheostomy care     Frequency: BID     Number of Occurrences: Until Specified       RECOMMENDATIONS    We recommend: RRT Recs: Continue POC per primary team.      FOLLOW-UP    Please call back the Rapid Response RT, Yenny Rao, RRT at x [phone] for any questions or concerns.

## 2022-08-28 NOTE — PLAN OF CARE
Pt cooperative with routine care and procedures. Pt VSS. Pt primarily in bed activities and reminded to call for assistance if s/s of distress occur. Bed locked and in lowest position. Call bell and urinal within reach. Pt on 10L nasal canula with 40% concentrated Oxygen. Dressings remained intact. LVAD numbers WNL and no alarms. Pt received heparin 14 units/kg/hr at 11.7ml/hr with two consecutive therapeutic aptts.     Pt found at 0615 covered in his own blood. His PIV had become detached from the extension pig tail and he was bleeding directly out of his vein onto himself. The pt was asleep when I found him.  with HTS notified and he ordered to draw a HGB. Pt's BP was 78/0 and he was asymptomatic. Day nurse Taylor made aware of situation. Pt remained free from s/s of distress and pain.        Problem: Adult Inpatient Plan of Care  Goal: Plan of Care Review  Outcome: Ongoing, Progressing  Flowsheets (Taken 8/28/2022 0550)  Plan of Care Reviewed With: patient  Goal: Patient-Specific Goal (Individualized)  Outcome: Ongoing, Progressing  Flowsheets (Taken 8/28/2022 0550)  Anxieties, Fears or Concerns: Nausea and dizziness  Individualized Care Needs: Monitor VS, s/s of distress, and nausea  Patient-Specific Goals (Include Timeframe): To regain strength and mobility before discharge  Goal: Absence of Hospital-Acquired Illness or Injury  Outcome: Ongoing, Progressing  Goal: Optimal Comfort and Wellbeing  Outcome: Ongoing, Progressing  Goal: Readiness for Transition of Care  Outcome: Ongoing, Progressing     Problem: Infection  Goal: Absence of Infection Signs and Symptoms  Outcome: Ongoing, Progressing     Problem: Neutropenia  Goal: Absence of Infection  Outcome: Ongoing, Progressing     Problem: Fall Injury Risk  Goal: Absence of Fall and Fall-Related Injury  Outcome: Ongoing, Progressing     Problem: Adjustment to Device (Ventricular Assist Device)  Goal: Optimal Adjustment to Device  Outcome: Ongoing,  Progressing     Problem: Bleeding (Ventricular Assist Device)  Goal: Absence of Bleeding  Outcome: Ongoing, Progressing     Problem: Embolism (Ventricular Assist Device)  Goal: Absence of Embolism Signs and Symptoms  Outcome: Ongoing, Progressing     Problem: Hemodynamic Instability (Ventricular Assist Device)  Goal: Optimal Blood Flow  Outcome: Ongoing, Progressing     Problem: Infection (Ventricular Assist Device)  Goal: Absence of Infection Signs and Symptoms  Outcome: Ongoing, Progressing     Problem: Right-Sided Heart Compromise (Ventricular Assist Device)  Goal: Effective Right-Sided Heart Function  Outcome: Ongoing, Progressing     Problem: Fluid and Electrolyte Imbalance (Acute Kidney Injury/Impairment)  Goal: Fluid and Electrolyte Balance  Outcome: Ongoing, Progressing     Problem: Impaired Wound Healing  Goal: Optimal Wound Healing  Outcome: Ongoing, Progressing

## 2022-08-28 NOTE — PROGRESS NOTES
[facility] - Cardiology Stepdown  Heart Transplant  Progress Note    Patient Name: Tim Richards  MRN: [id number]  Admission Date: 6/27/2022  Hospital Length of Stay: 62 days  Attending Physician: Roslyn Ragsdale DO  Primary Care Provider: Deyanira Booth MD  Principal Problem:Left ventricular assist device (LVAD) complication    Subjective:     Interval History: No acute events overnight. Denies any more nausea or dizziness. Medications have been adjusted by EP. Continues to have oropharyngeal secretions.     Continuous Infusions:   dextrose 10 % in water (D10W)      heparin (porcine) in D5W 15 Units/kg/hr (08/28/22 0336)     Scheduled Meds:   amiodarone  400 mg Oral Daily    aspirin  81 mg Oral Daily    levalbuterol  0.63 mg Nebulization Q6H    levothyroxine  50 mcg Oral Before breakfast    magnesium oxide  400 mg Oral BID    mexiletine  250 mg Oral Q8H    mirtazapine  15 mg Oral QHS    ondansetron  4 mg Intravenous Once    pantoprazole  40 mg Oral Daily    polyethylene glycol  17 g Oral Daily    [START ON 8/29/2022] predniSONE  5 mg Oral Daily    senna-docusate 8.6-50 mg  1 tablet Oral Daily    warfarin  7.5 mg Oral Daily     PRN Meds:sodium chloride 0.9%, acetaminophen, ALPRAZolam, bisacodyL, dextrose 10 % in water (D10W), dextrose 10%, dextrose 10%, glucagon (human recombinant), glucose, guaiFENesin 100 mg/5 ml, HYDROmorphone, HYDROmorphone, hydrOXYzine HCL, meclizine, melatonin, ondansetron    Review of patient's allergies indicates:   Allergen Reactions    Lisinopril Anaphylaxis    Hydralazine analogues      Chronic constipation, impotence, dizziness     Objective:     Vital Signs (Most Recent):  Temp: 98.8 °F (37.1 °C) (08/28/22 1541)  Pulse: 79 (08/28/22 1543)  Resp: 20 (08/28/22 1543)  BP: (!) 72/0 (08/28/22 1541)  SpO2: 96 % (08/28/22 1543)   Vital Signs (24h Range):  Temp:  [98.1 °F (36.7 °C)-98.8 °F (37.1 °C)] 98.8 °F (37.1 °C)  Pulse:  [53-96] 79  Resp:  [16-24] 20  SpO2:  [90  %-100 %] 96 %  BP: (68-82)/(0) 72/0     Patient Vitals for the past 72 hrs (Last 3 readings):   Weight   08/28/22 0336 93.7 kg (206 lb 9.1 oz)   08/27/22 0500 94 kg (207 lb 3.7 oz)   08/26/22 0500 93.5 kg (206 lb 1.6 oz)       Body mass index is 27.25 kg/m².      Intake/Output Summary (Last 24 hours) at 8/28/2022 1801  Last data filed at 8/28/2022 1400  Gross per 24 hour   Intake 2305.39 ml   Output 1100 ml   Net 1205.39 ml         Hemodynamic Parameters:       Telemetry: No significant arrhythmic events overnight.    Physical Exam  Vitals and nursing note reviewed.   Constitutional:       General: He is not in acute distress.     Appearance: Normal appearance. He is not ill-appearing.   HENT:      Head: Normocephalic and atraumatic.      Ears:      Abel exam findings: Lateralizes left.     Right Rinne: AC > BC.     Left Rinne: AC > BC.     Nose: Nose normal.      Mouth/Throat:      Mouth: Mucous membranes are moist.      Comments: Tracheostomy site is clean and dry without drainage  Eyes:      Extraocular Movements: Extraocular movements intact.      Conjunctiva/sclera: Conjunctivae normal.      Pupils: Pupils are equal, round, and reactive to light.   Cardiovascular:      Rate and Rhythm: Normal rate.      Pulses: Normal pulses.      Heart sounds: Normal heart sounds.      Comments: VAD hum can be heard, no significant JVD noted on exam  Pulmonary:      Effort: Pulmonary effort is normal.      Breath sounds: Normal breath sounds. No stridor.   Abdominal:      General: Abdomen is flat. There is no distension.      Palpations: Abdomen is soft.      Tenderness: There is no abdominal tenderness. There is no guarding.   Musculoskeletal:         General: Normal range of motion.      Cervical back: No rigidity.   Lymphadenopathy:      Cervical: No cervical adenopathy.   Skin:     General: Skin is warm and dry.      Capillary Refill: Capillary refill takes less than 2 seconds.   Neurological:      General: No focal  deficit present.      Mental Status: He is alert and oriented to person, place, and time.   Psychiatric:         Mood and Affect: Mood normal.         Behavior: Behavior normal.         Thought Content: Thought content normal.         Judgment: Judgment normal.       Significant Labs:  CBC:  Recent Labs   Lab 08/26/22  0425 08/27/22  0511 08/28/22  0506 08/28/22  0719   WBC 7.12 7.28 6.79  --    RBC 2.83* 2.88* 2.79*  --    HGB 7.7* 7.9* 7.8* 7.1*   HCT 27.5* 27.1* 26.7*  --     177 174  --    MCV 97 94 96  --    MCH 27.2 27.4 28.0  --    MCHC 28.0* 29.2* 29.2*  --        BNP:  Recent Labs   Lab 08/22/22  0510 08/24/22  0448 08/26/22  0425   * 568* 210*       CMP:  Recent Labs   Lab 08/22/22  0510 08/23/22  0434 08/24/22  0856 08/25/22  0338 08/26/22  0425 08/27/22  0511 08/28/22  0506   GLU 69*   < > 68* 93 82 81 91   CALCIUM 8.9   < > 8.7 8.9 8.8 9.0 8.9   ALBUMIN 2.1*  --  2.2* 2.1*  --   --   --    PROT 5.9*  --  5.9* 5.6*  --   --   --    *   < > 132* 134* 132* 130* 134*   K 4.7   < > 4.5 4.7 4.4 4.6 4.8   CO2 26   < > 28 28 26 26 29      < > 99 100 98 98 100   BUN 21*   < > 17 20 18 16 15   CREATININE 1.1   < > 1.1 1.2 0.9 1.1 1.0   ALKPHOS 182*  --  166* 159*  --   --   --    ALT 58*  --  56* 53*  --   --   --    AST 34  --  32 29  --   --   --    BILITOT 1.6*  --  1.3* 1.2*  --   --   --     < > = values in this interval not displayed.        Coagulation:   Recent Labs   Lab 08/26/22  0426 08/26/22  0556 08/27/22  0511 08/27/22  1208 08/27/22  1854 08/28/22  0215 08/28/22  0506   INR 1.4*  --  1.5*  --   --   --  1.5*   APTT 80.3*   < > 54.9*   < > 39.0* 39.3* 40.7*    < > = values in this interval not displayed.       LDH:  Recent Labs   Lab 08/26/22  0426 08/27/22  0511 08/28/22  0506   * 263* 229       Microbiology:  Microbiology Results (last 7 days)       ** No results found for the last 168 hours. **            I have reviewed all pertinent labs within the past 24  hours.    Estimated Creatinine Clearance: 94.3 mL/min (based on SCr of 1 mg/dL).    Diagnostic Results:  I have reviewed all pertinent imaging results/findings within the past 24 hours.    Assessment and Plan:     55 Y/O M with Hx of stage D HFrEF (EF 10%) due to NICM underwent HM2 implant 3/8/18 and exchange of outflow graft 3/9/18, Hx VT/VF s/p SICD 2014 presenting with gradual worsening shortness of breath associated with nonpleuritic, nonradiating bilateral 4/10 retrosternal chest pressure pain.  Symptoms began yesterday and have gradually worsened in the last 12 hours.  He does report going to a family picnic with increased consumption of chicken wings, ribs and other salty meat products.  Prior to symptom onset, he reports nausea, nonbloody diarrhea began yesterday and single episode of nonbloody nonbilious vomiting this morning. He also complains of dizziness, lightheadedness, and a mild HA. SOB worsened this morning prompting him to come to the ED.     In the ED, patient was in respiratory distress requiring BiPAP. MAP 96 and started on Nitro gtt. Work-up revealed WBC 10, K 5.4, Cr 2.5 (baseline 1.9), BNP 1950 (baseline 200-300s), Bili 2.1, LDH 1058, and INR 3.2. Bedside TTE with severely reduced EF, AV not opening, septum bulging into RV. Given IV Lasix 80 mg x1 with only 100-200 mL UOP and dark in color. HM2 interrogated and flows have been 8.5-9 L/min with no alarms or power surges. Cardiology consulted in the ED, dicussed with HTS, and decision made to admit to ICU for further mgmt.       * Left ventricular assist device (LVAD) complication  The patient presented with COVID and found to have an uptrending LDH with increased power.  He underwent HM III upgrade on 7/13/22  -Daily LDH   -Continue Heparin drip    Procedure: Device Interrogation Including analysis of device parameters  Current Settings: Ventricular Assist Device    TXP LVAD INTERROGATIONS 8/28/2022 8/28/2022 8/28/2022 8/28/2022 8/27/2022  8/27/2022 8/27/2022   Type HeartMate3 HeartMate3 HeartMate3 HeartMate3 HeartMate3 HeartMate3 HeartMate3   Flow 5.5 5.4 5.2 5.3 5.3 5.3 5.2   Speed 6300 6300 6300 6300 6300 6300 6300   PI 1.7 2.3 3.7 3.4 3.9 3.6 3.8   Power ([place]) 5.3 5.4 5.3 5.3 5.2 5.4 5.2   LSL - - - - - 5900 5900   Low Flow Alarm - - - - - no no   High Power Alarm - - - - - no no   Pulsatility - - No Pulse Intermittent pulse Intermittent pulse Intermittent pulse Intermittent pulse       LVAD (left ventricular assist device) present  Procedure: Device Interrogation Including analysis of device parameters  Current Settings: Ventricular Assist Device  Review of device function is stable/unstable stable    TXP LVAD INTERROGATIONS 8/28/2022 8/28/2022 8/28/2022 8/28/2022 8/27/2022 8/27/2022 8/27/2022   Type HeartMate3 HeartMate3 HeartMate3 HeartMate3 HeartMate3 HeartMate3 HeartMate3   Flow 5.5 5.4 5.2 5.3 5.3 5.3 5.2   Speed 6300 6300 6300 6300 6300 6300 6300   PI 1.7 2.3 3.7 3.4 3.9 3.6 3.8   Power (Jackson) 5.3 5.4 5.3 5.3 5.2 5.4 5.2   LSL - - - - - 5900 5900   Low Flow Alarm - - - - - no no   High Power Alarm - - - - - no no   Pulsatility - - No Pulse Intermittent pulse Intermittent pulse Intermittent pulse Intermittent pulse       Dizziness  Patient endorses positional dizziness and now associated with nausea    Constipation  -CT abdomen and pelvis show large stool burden and gas  -Urged patient to continue aggressive bowel regimen  -Currently on lactulose, miralax, and senna docusate but patient has been refusing past 3 days per documentation  -MBS within normal limits  -will advance diet as tolerated    History of ventricular tachycardia  Patient experienced an episode of VT on 6/30 and experienced an ICD shock thought to be related to hypokalemia (K of 3.1 on 6/30)  - Aggressively replete K, keep K>4 and Mg>2  - Continue mexiletine PO and amio 200mg.   - EP signed off and recommending Amiodarone and Mexilitine,  - Continue to monitor on  telemetry  - EP consulted    Acute on chronic combined systolic and diastolic congestive heart failure  -not on GDMT due to hypotension    amiodarone induced hypothyrodism  -was treated for amiodarone induced thyroiditis, no longer on methimazole  -On prednisone for now, will taper from 20mg daily down to 10mg daily x 3 days, and then 5mg daily x 3 days, will monitor periodic TFTs and signs of HPA dysfunction  -Endocrine is continuing to follow, patient now hypothyroid on labs, plan to resume synthroid, appreciate recommendations    VT (ventricular tachycardia)  Significant VT hx previously controlled with amio 400mg q d.   EP consulted for mexiletine dosing as pt was having significant nausea and dizziness.   -Meds changed to mexiletine 250mg TID  and amio increased to 400mg.    Hypoglycemia  - due to poor PO intake    Chronic combined systolic and diastolic heart failure  -Previously on HM2 but complicated by thrombosis related to COVID  -Underwent HM III upgrade on 7/13/22  -Volume status: Euvolemic, will monitor. No diuretics for now  -Chest tube removal, bronch, and old driveline removed 7/22  -Currently being bridged with heparin, continue coumadin to goal INR of 2-3  - Daily INRs        Susannah Garza MD  Heart Transplant  [facility] - Cardiology Stepdown

## 2022-08-28 NOTE — PROGRESS NOTES
"   08/28/22 1130        VAD 07/13/22 1300 Left ventricular assist device HeartMate 3   Placement Date/Time: 07/13/22 1300   Present Prior to Hospital Arrival?: No  Inserted by: MD  VAD Type: Left ventricular assist device  VAD Brand: HeartMate 3   Site Location Abdomen left   Site Assessment Dry;Clean;Intact;Sutures intact   Driveline Exit Site 2   Driveline Assessment Free of Kinks;Modular cable Clean and Locked;Intact   Dressing Status Clean;Dry;Intact   Dressing Intervention Sterile dressing change   Performed By RN   Dressing Change Schedule Daily   Dressing Change Due 08/29/22   Driveline [place] in use [place] deng   Condition CDI   Date changed 08/27/22   LVAD dressing change completed using sterile technique with kit. DLES is a "1" with minimal drainage noted on the drain sponge. Tolerated without any complication. Sutures remain intact, no redness, or tenderness noted.    "

## 2022-08-28 NOTE — ASSESSMENT & PLAN NOTE
Procedure: Device Interrogation Including analysis of device parameters  Current Settings: Ventricular Assist Device  Review of device function is stable/unstable stable    TXP LVAD INTERROGATIONS 8/28/2022 8/28/2022 8/28/2022 8/28/2022 8/27/2022 8/27/2022 8/27/2022   Type HeartMate3 HeartMate3 HeartMate3 HeartMate3 HeartMate3 HeartMate3 HeartMate3   Flow 5.5 5.4 5.2 5.3 5.3 5.3 5.2   Speed 6300 6300 6300 6300 6300 6300 6300   PI 1.7 2.3 3.7 3.4 3.9 3.6 3.8   Power ([place]) 5.3 5.4 5.3 5.3 5.2 5.4 5.2   LSL - - - - - 5900 5900   Low Flow Alarm - - - - - no no   High Power Alarm - - - - - no no   Pulsatility - - No Pulse Intermittent pulse Intermittent pulse Intermittent pulse Intermittent pulse

## 2022-08-28 NOTE — CONSULTS
[facility] - [facility]iology Stepdown  Electrophysiology  Consult Note    Patient Name: Tim Richards  MRN: [id number]  Admission Date: 6/27/2022  Hospital Length of Stay: 62 days  Code Status: Full Code   Attending Provider: Roslyn Ragsdale DO   Consulting Provider: Basim Wiley MD  Primary Care Physician: Deyanira Booth MD  Principal Problem:Left ventricular assist device (LVAD) complication    Patient information was obtained from patient and ER records.     Inpatient consult to Electrophysiology  Consult performed by: Basim Wiley MD  Consult ordered by: Susannah Garza MD        Subjective:     Chief Complaint:  Loss of appetite, nausea and dizziness    HPI:   57 yo male with PMH of HFrEF with an EF of 10% s/p HM2 admitted on 6/27 with acute respiratory failure secondary to COVID complicated with LVAD pump thrombosis that was refractory to medical therapy (failed integrillin). Underwent LVAD pump exchange with HM3 on 7/13. Post-op course complicated with worsening cardiogenic shock/RV failure requiring [facility]-ECMO. Improved clinically underwent successful decannulation. On 7/21, EP was initially consulted consulted due to episode of MMVT resulting in SQ-ICD discharge. Discharge successful and restoration of NSR. He was started on amiodarone gtt and restarted on home mexiletine. On 7/24, patient developed more frequent episodes of non sustained VT while on amiodarone PO, lidocaine, and mexiletine. He was restarted on IV amiodarone with improvement of his VT. Currently he is on mexelitine 400 q8h and amiodarone oral 200 mg daily. EP is re consulted regarding due to dizziness, nausea and poor appetite x 2.5 weeks and question about if mexelitine dose could be readjusted. He says his dizziness comes 5-10 min after taking mexelitine and is followed by nausea. He denies vertigo episodes. Today, he didn't eat breakfast tray, ate 25-50% of lunch tray and says isn't hungry for supper.        Past Medical History:   Diagnosis Date    A-fib     Anticoagulant long-term use     Atrial flutter 7/30/2022    CHF (congestive heart failure)     Class 1 obesity due to excess calories with serious comorbidity and body mass index (BMI) of 31.0 to 31.9 in adult     Dilated cardiomyopathy 1/10/2018    Disorder of kidney and ureter     CKD    Encounter for blood transfusion     Gout     HTN (hypertension)     Hx of psychiatric care     ICD (implantable cardioverter-defibrillator) infection 7/1/2020    Psychiatric problem     Thyroid disease     Ventricular tachycardia (paroxysmal)        Past Surgical History:   Procedure Laterality Date    AORTIC VALVULOPLASTY N/A 7/13/2022    Procedure: REPAIR, AORTIC VALVE;  Surgeon: Yg Kaufman MD;  Location: [facility] OR [facility] FLR;  Service: Cardiovascular;  Laterality: N/A;    APPLICATION OF WOUND VACUUM-ASSISTED CLOSURE DEVICE N/A 7/15/2022    Procedure: APPLICATION, WOUND VAC;  Surgeon: Yg Kaufman MD;  [facility];  Service: Cardiovascular;  Laterality: N/A;  50 x 5 cm     CARDIAC CATHETERIZATION  Dec. 2012    CARDIAC DEFIBRILLATOR PLACEMENT Left     CRRT-D    COLONOSCOPY N/A 3/6/2018    Procedure: COLONOSCOPY;  Surgeon: Alonso Bone MD;  Location: [facility] (2ND FLR);  Service: Endoscopy;  Laterality: N/A;    COLONOSCOPY N/A 7/17/2019    Procedure: COLONOSCOPY;  Surgeon: Blane Valdez MD;  Location: [facility] (2ND FLR);  Service: Endoscopy;  Laterality: N/A;    COLONOSCOPY N/A 7/18/2019    Procedure: COLONOSCOPY;  Surgeon: Blane Valdez MD;  Location: [facility] ENDO (2ND FLR);  Service: Endoscopy;  Laterality: N/A;    ESOPHAGOGASTRODUODENOSCOPY N/A 7/17/2019    Procedure: EGD (ESOPHAGOGASTRODUODENOSCOPY);  Surgeon: Blane Valdez MD;  Location: [facility] ENDO (2ND FLR);  Service: Endoscopy;  Laterality: N/A;    ESOPHAGOGASTRODUODENOSCOPY N/A 7/18/2019    Procedure: EGD (ESOPHAGOGASTRODUODENOSCOPY);  Surgeon: Blane Valdez MD;  Location: [facility] ENDO (2ND  FLR);  Service: Endoscopy;  Laterality: N/A;    FOREIGN BODY REMOVAL N/A 7/22/2022    Procedure: REMOVAL, FOREIGN BODY;  Surgeon: Yg Kaufman MD;  Location: [facility] OR [facility] FLR;  Service: Cardiovascular;  Laterality: N/A;  LVAD Heartmate 2 drive line removal    INSERTION OF GRAFT TO PERICARDIUM N/A 7/15/2022    Procedure: INSERTION, GRAFT, PERICARDIUM;  Surgeon: Yg Kaufman MD;  Location: [facility] OR [facility] FLR;  Service: Cardiovascular;  Laterality: N/A;    IRRIGATION OF MEDIASTINUM N/A 7/15/2022    Procedure: IRRIGATION, MEDIASTINUM;  Surgeon: Yg Kaufman MD;  Location: [facility] OR [facility] FLR;  Service: Cardiovascular;  Laterality: N/A;    LYSIS OF ADHESIONS  7/13/2022    Procedure: LYSIS, ADHESIONS;  Surgeon: Yg Kaufman MD;  [facility];  Service: Cardiovascular;;    NONINVASIVE CARDIAC ELECTROPHYSIOLOGY STUDY N/A 10/18/2019    Procedure: CARDIAC ELECTROPHYSIOLOGY STUDY, NONINVASIVE;  Surgeon: Raz Wagner MD;  Location: [facility] EP LAB;  Service: Cardiology;  Laterality: N/A;  VT, DFTs, MDT CRTD in situ, LVAD, LASHELL pizarro, 3098    REPLACEMENT OF IMPLANTABLE CARDIOVERTER-DEFIBRILLATOR (ICD) GENERATOR N/A 3/9/2020    Procedure: REPLACEMENT, ICD GENERATOR;  Surgeon: Harry Yun MD;  Location: [facility] EP LAB;  Service: Cardiology;  Laterality: N/A;  VT, ICD Gen Change and Lead Revision, MDT, MAC, DM,3 Prep    REPLACEMENT OF LEFT VENTRICULAR ASSIST DEVICE (LVAD)  7/13/2022    Procedure: REPLACEMENT, LVAD;  Surgeon: Yg Kaufman MD;  [facility];  Service: Cardiovascular;;    REPLACEMENT OF PUMP N/A 7/13/2022    Procedure: REPLACEMENT, PUMP;  Surgeon: Yg Kaufman MD;  [facility];  Service: Cardiovascular;  Laterality: N/A;  LVAD pump exchange  EXPLANATION OF HEATMATE 2  IMPLANTATION OF HEARTMATE 3  IMPLANTATION OF 8MM CHIMNEY GRAFT TO RFA  INITIATION OF ECMO  TEMPORARY CLOSURE OF CHEST    REVISION OF IMPLANTABLE CARDIOVERTER-DEFIBRILLATOR (ICD) ELECTRODE LEAD  PLACEMENT N/A 3/9/2020    Procedure: REVISION, INSERTION, ELECTRODE LEAD, ICD;  Surgeon: Harry Yun MD;  Location: [facility] EP LAB;  Service: Cardiology;  Laterality: N/A;  VT, ICD Gen Change and Lead Revision, MDT, MAC, DM,3 Prep    STERNAL WOUND CLOSURE N/A 7/14/2022    Procedure: CLOSURE, WOUND, STERNUM;  Surgeon: Yg Kaufman MD;  Location: [facility] OR [facility] FLR;  Service: Cardiovascular;  Laterality: N/A;  temporary closure  evacuation of hematoma    STERNAL WOUND CLOSURE N/A 7/15/2022    Procedure: CLOSURE, WOUND, STERNUM;  Surgeon: Yg Kaufman MD;  Location: [facility] OR [facility] FLR;  Service: Cardiovascular;  Laterality: N/A;    TRACHEOSTOMY N/A 8/4/2022    Procedure: CREATION, TRACHEOSTOMY;  Surgeon: Germain Holt MD;  [facility];  Service: General;  Laterality: N/A;    TREATMENT OF CARDIAC ARRHYTHMIA  10/18/2019    Procedure: Cardioversion or Defibrillation;  Surgeon: Raz Wagner MD;  Location: [facility] EP LAB;  Service: Cardiology;;       Review of patient's allergies indicates:   Allergen Reactions    Lisinopril Anaphylaxis    Hydralazine analogues      Chronic constipation, impotence, dizziness       No current facility-administered medications on file prior to encounter.     Current Outpatient Medications on File Prior to Encounter   Medication Sig    allopurinoL (ZYLOPRIM) 100 MG tablet TAKE 1 TABLET (100 MG) BY MOUTH NIGHTLY.    amiodarone (PACERONE) 200 MG Tab Take 2 tablets (400 mg total) by mouth once daily.    aspirin (ECOTRIN) 81 MG EC tablet Take 1 tablet (81 mg total) by mouth once daily.    busPIRone (BUSPAR) 5 MG Tab Take 1 tablet (5 mg total) by mouth 3 (three) times daily.    levothyroxine (SYNTHROID) 112 MCG tablet Take 1 tablet (112 mcg total) by mouth before breakfast.    mexiletine (MEXITIL) 250 MG Cap Take 1 capsule (250 mg total) by mouth every 8 (eight) hours.    pantoprazole (PROTONIX) 40 MG tablet TAKE 1 TABLET (40 MG TOTAL) BY MOUTH DAILY WITH LUNCH.     warfarin (COUMADIN) 5 MG tablet TAKE 7.5 MG ORALLY DAILY EVERY  AND THURSDAY, TAKE 5 MG ORALLY DAILY ON OTHER DAYS    [DISCONTINUED] ferrous gluconate (FERGON) 324 MG tablet TAKE 1 TABLET (324 MG TOTAL) BY MOUTH WITH LUNCH.    [DISCONTINUED] sildenafiL (VIAGRA) 100 MG tablet Take 1 tablet (100 mg total) by mouth daily as needed for Erectile Dysfunction.     Family History       Problem Relation (Age of Onset)    Cancer Sister (54)    Coronary artery disease Father    Diabetes Father    Heart disease Father    Hypertension Father    No Known Problems Mother, Brother          Tobacco Use    Smoking status: Former     Packs/day: 1.     Years: 31.     Pack years: 31.     Types: Cigarettes     Quit date: 2018     Years since quittin.6    Smokeless tobacco: Never    Tobacco comments:     pt is quiting on his own - pt stated not qualified for program;  pt  quit on his own   Substance and Sexual Activity    Alcohol use: No     Alcohol/week: 0.0 standard drinks     Comment: quit    Drug use: No    Sexual activity: Yes     Partners: Female     Birth control/protection: None     Comment: 10/5/17  with same partner 7 years      Review of Systems   Constitutional: Positive for decreased appetite and malaise/fatigue. Negative for fever.   HENT:  Negative for ear pain.    Eyes:  Negative for double vision.   Cardiovascular:  Negative for chest pain and palpitations.   Respiratory:  Negative for shortness of breath.    Endocrine: Negative for heat intolerance.   Hematologic/Lymphatic: Negative for adenopathy.   Skin:  Negative for dry skin.   Musculoskeletal:  Negative for falls.   Gastrointestinal:  Negative for anorexia.   Genitourinary:  Negative for flank pain.   Neurological:  Positive for dizziness. Negative for disturbances in coordination.   Psychiatric/Behavioral:  Negative for depression.    Objective:     Vital Signs (Most Recent):  Temp: 98.3 °F (36.8 °C) (22  0006)  Pulse: 74 (08/28/22 0006)  Resp: 16 (08/28/22 0006)  BP: (!) 70/0 (08/28/22 0006)  SpO2: 99 % (08/28/22 0006)   Vital Signs (24h Range):  Temp:  [97.1 °F (36.2 °C)-98.5 °F (36.9 °C)] 98.3 °F (36.8 °C)  Pulse:  [40-98] 74  Resp:  [14-24] 16  SpO2:  [94 %-100 %] 99 %  BP: (68-82)/(0) 70/0     Weight: 94 kg (207 lb 3.7 oz)  Body mass index is 27.34 kg/m².    SpO2: 99 %  O2 Device (Oxygen Therapy): Trach Collar      Intake/Output Summary (Last 24 hours) at 8/28/2022 0025  Last data filed at 8/27/2022 2045  Gross per 24 hour   Intake 2787.39 ml   Output 1630 ml   Net 1157.39 ml       Lines/Drains/Airways       Airway  Duration                  Surgical Airway 08/04/22 Shiley Cuffed 24 days              Line  Duration                  VAD 07/13/22 1300 Left ventricular assist device HeartMate 3 45 days              Peripheral Intravenous Line  Duration                  Peripheral IV - Single Lumen 08/22/22 1830 20 G;1 3/4 in Anterior;Left Forearm 5 days         Midline Catheter Insertion/Assessment  - Single Lumen 08/25/22 1056 Left brachial vein 20g x 8cm 2 days                    Physical Exam  Constitutional:       General: He is not in acute distress.     Appearance: He is ill-appearing.   HENT:      Head: Normocephalic and atraumatic.      Nose: No congestion.      Mouth/Throat:      Mouth: Mucous membranes are moist.   Eyes:      Extraocular Movements: Extraocular movements intact.      Conjunctiva/sclera: Conjunctivae normal.   Cardiovascular:      Rate and Rhythm: NSR with rate 70s, no ectopy on telemetry review     Comments: Vad hum present   Pulmonary:      Effort: Pulmonary effort is normal. No respiratory distress.   Abdominal:      General: Abdomen is flat.   Musculoskeletal:      Cervical back: Normal range of motion.      Right lower leg: No edema.      Left lower leg: No edema.   Skin:     General: Skin is warm.      Capillary Refill: Capillary refill takes less than 2 seconds.      Findings: No  rash.   Neurological:      Mental Status: He is alert.         Assessment and Plan:     #Loss of appetite with dizziness and nausea  #Dizziness and nausea 5-10 min after mexelitine use  #Hx of VT  #Hx of A Flutter 2:1  #NICMP s/p LVAD HM III    55 yo male with PMH of HFrEF with an EF of 10% s/p HM2 admitted on 6/27 with acute respiratory failure secondary to COVID complicated with LVAD pump thrombosis s/p LVAD pump exchange with HM3 on 7/13. Post-op course complicated with worsening cardiogenic shock/RV failure requiring VA-ECMO. Currently, he is on the floor and rhythm has been normal with out ectopy with mexelitine 400q8h and amiodarone 200qd. EP is re consulted due to dizziness, nausea and poor appetite x 2.5 weeks and question about if mexelitine dose could be readjusted. He says his dizziness comes 5-10 min after taking mexelitine and is followed by nausea. He denies vertigo episodes. Today, he didn't eat breakfast tray, ate 25-50% of lunch tray and says isn't hungry for supper.     Recommendations:  -In view of loss of appetite, dizziness and nausea, we've to weigh risks of ectopy/VT and benefits of improved appetite with decreasing the mexelitine dose, if desired, we can decrease mexelitine dose to 250 mg q8h and increase amiodarone dose to 400 mg qd  -keep K>4 and Mg>2  -watch for ectopy closely on telemetry    D/w Dr Rae      Thank you for your consult. I will follow-up with patient. Please contact us if you have any additional questions.    Basim Wiley MD  Cardiology   [facility] - Cardiology Stepdown

## 2022-08-28 NOTE — ASSESSMENT & PLAN NOTE
The patient presented with COVID and found to have an uptrending LDH with increased power.  He underwent HM III upgrade on 7/13/22  -Daily LDH   -Continue Heparin drip    Procedure: Device Interrogation Including analysis of device parameters  Current Settings: Ventricular Assist Device    TXP LVAD INTERROGATIONS 8/28/2022 8/28/2022 8/28/2022 8/28/2022 8/27/2022 8/27/2022 8/27/2022   Type HeartMate3 HeartMate3 HeartMate3 HeartMate3 HeartMate3 HeartMate3 HeartMate3   Flow 5.5 5.4 5.2 5.3 5.3 5.3 5.2   Speed 6300 6300 6300 6300 6300 6300 6300   PI 1.7 2.3 3.7 3.4 3.9 3.6 3.8   Power ([place]) 5.3 5.4 5.3 5.3 5.2 5.4 5.2   LSL - - - - - 5900 5900   Low Flow Alarm - - - - - no no   High Power Alarm - - - - - no no   Pulsatility - - No Pulse Intermittent pulse Intermittent pulse Intermittent pulse Intermittent pulse

## 2022-08-29 PROBLEM — Z95.811 LVAD (LEFT VENTRICULAR ASSIST DEVICE) PRESENT: Status: ACTIVE | Noted: 2018-06-15

## 2022-08-29 PROBLEM — I47.20 VT (VENTRICULAR TACHYCARDIA): Status: RESOLVED | Noted: 2019-10-12 | Resolved: 2022-08-29

## 2022-08-29 PROBLEM — T46.2X1A HYPOTHYROIDISM DUE TO AMIODARONE: Status: RESOLVED | Noted: 2019-11-08 | Resolved: 2022-08-29

## 2022-08-29 PROBLEM — K59.00 CONSTIPATION: Status: RESOLVED | Noted: 2022-08-23 | Resolved: 2022-08-29

## 2022-08-29 PROBLEM — E03.2 HYPOTHYROIDISM DUE TO AMIODARONE: Status: RESOLVED | Noted: 2019-11-08 | Resolved: 2022-08-29

## 2022-08-29 PROBLEM — Z95.811 LVAD (LEFT VENTRICULAR ASSIST DEVICE) PRESENT: Status: RESOLVED | Noted: 2022-08-28 | Resolved: 2022-08-29

## 2022-08-29 PROBLEM — R42 DIZZINESS: Status: RESOLVED | Noted: 2022-08-23 | Resolved: 2022-08-29

## 2022-08-29 LAB
ANION GAP SERPL CALC-SCNC: 6 MMOL/L (ref 8–16)
APTT BLDCRRT: 45.3 SEC (ref 21–32)
BASOPHILS # BLD AUTO: 0 K/UL (ref 0–0.2)
BASOPHILS NFR BLD: 0 % (ref 0–1.9)
BNP SERPL-MCNC: 171 PG/ML (ref 0–99)
BUN SERPL-MCNC: 15 MG/DL (ref 6–20)
CALCIUM SERPL-MCNC: 9.1 MG/DL (ref 8.7–10.5)
CHLORIDE SERPL-SCNC: 98 MMOL/L (ref 95–110)
CO2 SERPL-SCNC: 29 MMOL/L (ref 23–29)
CREAT SERPL-MCNC: 1 MG/DL (ref 0.5–1.4)
CRP SERPL-MCNC: 12 MG/L (ref 0–8.2)
DIFFERENTIAL METHOD: ABNORMAL
EOSINOPHIL # BLD AUTO: 0.1 K/UL (ref 0–0.5)
EOSINOPHIL NFR BLD: 2.1 % (ref 0–8)
ERYTHROCYTE [DISTWIDTH] IN BLOOD BY AUTOMATED COUNT: 17.3 % (ref 11.5–14.5)
EST. GFR  (NO RACE VARIABLE): >60 ML/MIN/1.73 M^2
GLUCOSE SERPL-MCNC: 72 MG/DL (ref 70–110)
HCT VFR BLD AUTO: 24.5 % (ref 40–54)
HGB BLD-MCNC: 7.1 G/DL (ref 14–18)
IMM GRANULOCYTES # BLD AUTO: 0.04 K/UL (ref 0–0.04)
IMM GRANULOCYTES NFR BLD AUTO: 0.8 % (ref 0–0.5)
INR PPP: 1.8 (ref 0.8–1.2)
LDH SERPL L TO P-CCNC: 229 U/L (ref 110–260)
LYMPHOCYTES # BLD AUTO: 0.8 K/UL (ref 1–4.8)
LYMPHOCYTES NFR BLD: 15 % (ref 18–48)
MAGNESIUM SERPL-MCNC: 2.1 MG/DL (ref 1.6–2.6)
MCH RBC QN AUTO: 27.5 PG (ref 27–31)
MCHC RBC AUTO-ENTMCNC: 29 G/DL (ref 32–36)
MCV RBC AUTO: 95 FL (ref 82–98)
MONOCYTES # BLD AUTO: 0.5 K/UL (ref 0.3–1)
MONOCYTES NFR BLD: 9.5 % (ref 4–15)
NEUTROPHILS # BLD AUTO: 3.8 K/UL (ref 1.8–7.7)
NEUTROPHILS NFR BLD: 72.6 % (ref 38–73)
NRBC BLD-RTO: 0 /100 WBC
PHOSPHATE SERPL-MCNC: 3.3 MG/DL (ref 2.7–4.5)
PLATELET # BLD AUTO: 176 K/UL (ref 150–450)
PMV BLD AUTO: 10.6 FL (ref 9.2–12.9)
POCT GLUCOSE: 103 MG/DL (ref 70–110)
POCT GLUCOSE: 109 MG/DL (ref 70–110)
POCT GLUCOSE: 74 MG/DL (ref 70–110)
POCT GLUCOSE: 77 MG/DL (ref 70–110)
POCT GLUCOSE: 81 MG/DL (ref 70–110)
POCT GLUCOSE: 82 MG/DL (ref 70–110)
POTASSIUM SERPL-SCNC: 4.7 MMOL/L (ref 3.5–5.1)
PREALB SERPL-MCNC: 23 MG/DL (ref 20–43)
PROTHROMBIN TIME: 17.8 SEC (ref 9–12.5)
RBC # BLD AUTO: 2.58 M/UL (ref 4.6–6.2)
SODIUM SERPL-SCNC: 133 MMOL/L (ref 136–145)
WBC # BLD AUTO: 5.28 K/UL (ref 3.9–12.7)

## 2022-08-29 PROCEDURE — 92526 ORAL FUNCTION THERAPY: CPT

## 2022-08-29 PROCEDURE — 80048 BASIC METABOLIC PNL TOTAL CA: CPT | Performed by: INTERNAL MEDICINE

## 2022-08-29 PROCEDURE — 97535 SELF CARE MNGMENT TRAINING: CPT

## 2022-08-29 PROCEDURE — 27000248 HC VAD-ADDITIONAL DAY

## 2022-08-29 PROCEDURE — 93750 INTERROGATION VAD IN PERSON: CPT | Mod: ,,, | Performed by: INTERNAL MEDICINE

## 2022-08-29 PROCEDURE — 94640 AIRWAY INHALATION TREATMENT: CPT

## 2022-08-29 PROCEDURE — 25000003 PHARM REV CODE 250: Performed by: STUDENT IN AN ORGANIZED HEALTH CARE EDUCATION/TRAINING PROGRAM

## 2022-08-29 PROCEDURE — 99900022

## 2022-08-29 PROCEDURE — 85610 PROTHROMBIN TIME: CPT | Performed by: STUDENT IN AN ORGANIZED HEALTH CARE EDUCATION/TRAINING PROGRAM

## 2022-08-29 PROCEDURE — 86140 C-REACTIVE PROTEIN: CPT | Performed by: STUDENT IN AN ORGANIZED HEALTH CARE EDUCATION/TRAINING PROGRAM

## 2022-08-29 PROCEDURE — 25000003 PHARM REV CODE 250: Performed by: INTERNAL MEDICINE

## 2022-08-29 PROCEDURE — 97110 THERAPEUTIC EXERCISES: CPT

## 2022-08-29 PROCEDURE — 84100 ASSAY OF PHOSPHORUS: CPT | Performed by: STUDENT IN AN ORGANIZED HEALTH CARE EDUCATION/TRAINING PROGRAM

## 2022-08-29 PROCEDURE — 99900035 HC TECH TIME PER 15 MIN (STAT)

## 2022-08-29 PROCEDURE — 25000242 PHARM REV CODE 250 ALT 637 W/ HCPCS

## 2022-08-29 PROCEDURE — 27000221 HC OXYGEN, UP TO 24 HOURS

## 2022-08-29 PROCEDURE — 94761 N-INVAS EAR/PLS OXIMETRY MLT: CPT

## 2022-08-29 PROCEDURE — 99233 PR SUBSEQUENT HOSPITAL CARE,LEVL III: ICD-10-PCS | Mod: ,,, | Performed by: INTERNAL MEDICINE

## 2022-08-29 PROCEDURE — 97530 THERAPEUTIC ACTIVITIES: CPT

## 2022-08-29 PROCEDURE — 83735 ASSAY OF MAGNESIUM: CPT | Performed by: STUDENT IN AN ORGANIZED HEALTH CARE EDUCATION/TRAINING PROGRAM

## 2022-08-29 PROCEDURE — 99233 SBSQ HOSP IP/OBS HIGH 50: CPT | Mod: ,,, | Performed by: INTERNAL MEDICINE

## 2022-08-29 PROCEDURE — 63600175 PHARM REV CODE 636 W HCPCS: Performed by: STUDENT IN AN ORGANIZED HEALTH CARE EDUCATION/TRAINING PROGRAM

## 2022-08-29 PROCEDURE — 83615 LACTATE (LD) (LDH) ENZYME: CPT | Performed by: INTERNAL MEDICINE

## 2022-08-29 PROCEDURE — 27200966 HC CLOSED SUCTION SYSTEM

## 2022-08-29 PROCEDURE — 85730 THROMBOPLASTIN TIME PARTIAL: CPT | Performed by: INTERNAL MEDICINE

## 2022-08-29 PROCEDURE — 84134 ASSAY OF PREALBUMIN: CPT | Performed by: INTERNAL MEDICINE

## 2022-08-29 PROCEDURE — 93750 PR INTERROGATE VENT ASSIST DEV, IN PERSON, W PHYSICIAN ANALYSIS: ICD-10-PCS | Mod: ,,, | Performed by: INTERNAL MEDICINE

## 2022-08-29 PROCEDURE — 85025 COMPLETE CBC W/AUTO DIFF WBC: CPT | Performed by: STUDENT IN AN ORGANIZED HEALTH CARE EDUCATION/TRAINING PROGRAM

## 2022-08-29 PROCEDURE — 83880 ASSAY OF NATRIURETIC PEPTIDE: CPT | Performed by: INTERNAL MEDICINE

## 2022-08-29 PROCEDURE — 20600001 HC STEP DOWN PRIVATE ROOM

## 2022-08-29 PROCEDURE — 27000685 HC LVAD KIT (30 DAY SUPPLY)

## 2022-08-29 PROCEDURE — 25000003 PHARM REV CODE 250: Performed by: NURSE PRACTITIONER

## 2022-08-29 PROCEDURE — 92507 TX SP LANG VOICE COMM INDIV: CPT

## 2022-08-29 RX ORDER — MIRTAZAPINE 15 MG/1
30 TABLET, FILM COATED ORAL NIGHTLY
Status: DISCONTINUED | OUTPATIENT
Start: 2022-08-29 | End: 2022-09-01 | Stop reason: HOSPADM

## 2022-08-29 RX ADMIN — SENNOSIDES AND DOCUSATE SODIUM 1 TABLET: 50; 8.6 TABLET ORAL at 08:08

## 2022-08-29 RX ADMIN — Medication 16 G: at 07:08

## 2022-08-29 RX ADMIN — PANTOPRAZOLE SODIUM 40 MG: 40 TABLET, DELAYED RELEASE ORAL at 08:08

## 2022-08-29 RX ADMIN — Medication 400 MG: at 08:08

## 2022-08-29 RX ADMIN — HEPARIN SODIUM 15 UNITS/KG/HR: 5000 INJECTION INTRAVENOUS; SUBCUTANEOUS at 12:08

## 2022-08-29 RX ADMIN — POLYETHYLENE GLYCOL 3350 17 G: 17 POWDER, FOR SOLUTION ORAL at 08:08

## 2022-08-29 RX ADMIN — MIRTAZAPINE 30 MG: 15 TABLET, FILM COATED ORAL at 09:08

## 2022-08-29 RX ADMIN — ALPRAZOLAM 1 MG: 0.5 TABLET ORAL at 10:08

## 2022-08-29 RX ADMIN — LEVALBUTEROL HYDROCHLORIDE 0.63 MG: 0.63 SOLUTION RESPIRATORY (INHALATION) at 02:08

## 2022-08-29 RX ADMIN — LEVOTHYROXINE SODIUM 50 MCG: 50 TABLET ORAL at 06:08

## 2022-08-29 RX ADMIN — AMIODARONE HYDROCHLORIDE 400 MG: 200 TABLET ORAL at 08:08

## 2022-08-29 RX ADMIN — PREDNISONE 5 MG: 2.5 TABLET ORAL at 08:08

## 2022-08-29 RX ADMIN — MEXILETINE HYDROCHLORIDE 250 MG: 250 CAPSULE ORAL at 03:08

## 2022-08-29 RX ADMIN — ASPIRIN 81 MG CHEWABLE TABLET 81 MG: 81 TABLET CHEWABLE at 08:08

## 2022-08-29 RX ADMIN — MEXILETINE HYDROCHLORIDE 250 MG: 250 CAPSULE ORAL at 06:08

## 2022-08-29 RX ADMIN — Medication 400 MG: at 09:08

## 2022-08-29 RX ADMIN — MEXILETINE HYDROCHLORIDE 250 MG: 250 CAPSULE ORAL at 09:08

## 2022-08-29 RX ADMIN — LEVALBUTEROL HYDROCHLORIDE 0.63 MG: 0.63 SOLUTION RESPIRATORY (INHALATION) at 01:08

## 2022-08-29 RX ADMIN — WARFARIN SODIUM 7.5 MG: 7.5 TABLET ORAL at 05:08

## 2022-08-29 RX ADMIN — LEVALBUTEROL HYDROCHLORIDE 0.63 MG: 0.63 SOLUTION RESPIRATORY (INHALATION) at 09:08

## 2022-08-29 NOTE — PT/OT/SLP PROGRESS
Speech Language Pathology Treatment/ discharge summary     Patient Name:  Tim Richards   MRN:  [id number]  Admitting Diagnosis: Acute on chronic combined systolic and diastolic congestive heart failure    Recommendations:                 General Recommendations:  Dysphagia therapy and Modified barium swallow study  Recommendations:                General Recommendations:  Dysphagia therapy  Diet recommendations:  Regular, Thin   Aspiration Precautions: speaking valve in place for all meals , intermittent double swallows with solids to clear any residue , 1 bite/sip at a time, Alternating bites/sips, Eliminate distractions, Feed only when awake/alert and HOB to 90 degrees   General Precautions: Standard, fall, LVAD, sternal  Communication strategies:  One way speaking valve    Subjective     Awake and pleasant  RN in agreement with seeing pt at this time     Pain/Comfort:  Pain Rating 1: 0/10    Respiratory Status:  trach collar     Objective:     Has the patient been evaluated by SLP for swallowing?   Yes  Keep patient NPO? No     Pt seen upright awake and alert. Pt with AM meal at bedside. Pt with speaking valve in place and able to achieve strong clear speech with valve in place. Pt pending trach down size and move towards capping trial for potential trach decannulation.  Pt tolerating diet of regular solids and thin liquids without overt clinica signs of aspiration. SLP dicussed give pt tolerating regular diet and thin for over 1 week post MBS no further skilled speech therapy services warranted. Pt able to teach back all speaking valve use care and precautions.     Assessment:     Tim Richards is a 56 y.o. male with an SLP diagnosis of Dysphagia, S/P Trach, and One Way Speaking Valve Training.   Dysphagia since resolved and pt independent with speaking valve use and care.     Goals:   Multidisciplinary Problems       SLP Goals          Problem: SLP    Goal Priority Disciplines Outcome   SLP Goal      SLP Ongoing, Progressing   Description: Speech Language Pathology Goals  Goals expected to be met by 8/18    1.Pt will participate in more objective swallow assessment via MBSS to help determine least restrictive diet   2. . Pt will tolerate PMSV for waking hours to increase phonation, respiration and swallowing aspects  3. Pt/family will don/doff PMSV x1 during session with min cues      Speech Language Pathology Goals  Goals expected to be met by 8/25/22    1. Pt will tolerate full nectar thick liquid diet without overt clinical signs of aspiration   2. Pt will participate in repeat MBS to help determine least restrictive diet   3. Pt will continue to tolerate PMSV for all waking hours                                Plan:     Patient to be seen:  2 x/week   Plan of Care reviewed with:  patient   SLP Follow-Up:  No       Discharge recommendations:  rehabilitation facility     Time Tracking:     SLP Treatment Date:   08/29/22  Speech Start Time:  0918  Speech Stop Time:  0930     Speech Total Time (min):  12 min    Billable Minutes: Treatment Swallowing Dysfunction 6 and Therapeutic Speech Device 6    08/29/2022

## 2022-08-29 NOTE — PT/OT/SLP PROGRESS
Occupational Therapy   Co-Treatment    Name: Tim Richards  MRN: [id number]  Admitting Diagnosis:  Acute on chronic combined systolic and diastolic congestive heart failure  25 Days Post-Op    Recommendations:     Discharge Recommendations: rehabilitation facility  Discharge Equipment Recommendations:   (TBD closer to d/c)  Barriers to discharge:  Decreased caregiver support, Inaccessible home environment    Assessment:     Tim Richards is a 56 y.o. male with a medical diagnosis of Acute on chronic combined systolic and diastolic congestive heart failure.  He presents with progress towards goals as evidenced by ability to tolerate sitting up in chair at end of session. Pt also with increased standing time during grooming task. Performance deficits affecting function are weakness, impaired endurance, impaired self care skills, impaired functional mobility, gait instability, pain, impaired balance, impaired cardiopulmonary response to activity, decreased lower extremity function. Co-evaluation/treatment performed due to patient's multiple deficits requiring two skilled therapists to appropriately and safely assess patient's strength and endurance while facilitating functional tasks in addition to accommodating for patient's activity tolerance.     Rehab Prognosis:  Good; patient would benefit from acute skilled OT services to address these deficits and reach maximum level of function.       Plan:     Patient to be seen 5 x/week to address the above listed problems via self-care/home management, therapeutic activities, therapeutic exercises, neuromuscular re-education  Plan of Care Expires: 08/25/22  Plan of Care Reviewed with: patient    Subjective     Pain/Comfort:  Pain Rating 1: 0/10  Pain Rating Post-Intervention 1: 0/10    Objective:     Communicated with: RN prior to session.  Patient found supine with telemetry, oxygen, Tracheostomy, LVAD, wound vac (Midline) upon OT entry to room.    General  Precautions: Standard, LVAD, fall   Orthopedic Precautions:N/A   Braces:    Respiratory Status:  Trach collar     Occupational Performance:     Bed Mobility:    Patient completed Scooting/Bridging with stand by assistance  Patient completed Supine to Sit with stand by assistance     Functional Mobility/Transfers:  Patient completed Sit <> Stand Transfer with moderate assistance and of 2 persons  with  hand-held assist from EOB x 2 trials and b/s chair x 1 trial  Patient completed Bed <> Chair Transfer using Squat Pivot technique with maximal assistance and of 2 persons with hand-held assist  Functional Mobility: Unable to perform    Activities of Daily Living:  Grooming: independence    Upper Body Dressing: stand by assistance    Lower Body Dressing: moderate assistance    Toileting: maximal assistance for pericare in standing      AMPA 6 Click ADL: 19    Treatment & Education:  Pt ed on OT POC  Pt ed on d/c of sternal precautions  Pt ed on armchair push ups 5 reps x 4x daily progressing to 10 reps    Patient left up in chair with all lines intact, call button in reach, and RN notified    GOALS:   Multidisciplinary Problems       Occupational Therapy Goals          Problem: Occupational Therapy    Goal Priority Disciplines Outcome Interventions   Occupational Therapy Goal     OT, PT/OT Ongoing, Progressing    Description: Goals to be met by: 8/9/22     Patient will increase functional independence with ADLs by performing:    UE Dressing with Stand-by Assistance.  LE Dressing with Stand-by Assistance.  Grooming while standing at sink with Stand-by Assistance.  Toileting from toilet with Stand-by Assistance for hygiene and clothing management.   Toilet transfer to toilet with Stand-by Assistance.                   Multidisciplinary Problems (Resolved)          Problem: Occupational Therapy    Goal Priority Disciplines Outcome Interventions   Occupational Therapy Goal   (Resolved)     OT, PT/OT Met           Problem:  Occupational Therapy    Goal Priority Disciplines Outcome Interventions   Occupational Therapy Goal   (Resolved)     OT, PT/OT Met                        Time Tracking:     OT Date of Treatment: 08/29/22  OT Start Time: 1059  OT Stop Time: 1127  OT Total Time (min): 28 min    Billable Minutes:Self Care/Home Management 15  Therapeutic Exercise 8               8/29/2022

## 2022-08-29 NOTE — NURSING
Latest Reference Range & Units 08/29/22 07:14   POCT Glucose 70 - 110 mg/dL 74   Glucose TAB PRN administered as per MAR. Pt encouraged to drink juice provided.

## 2022-08-29 NOTE — ASSESSMENT & PLAN NOTE
Patient experienced an episode of VT on 6/30 and experienced an ICD shock thought to be related to hypokalemia (K of 3.1 on 6/30)  - Significant VT hx previously controlled with amio 400mg q d.   EP consulted for mexiletine dosing as pt was having significant nausea and dizziness.   -Meds changed to mexiletine 250mg TID  and amio increased to 400mg.

## 2022-08-29 NOTE — ASSESSMENT & PLAN NOTE
-Appreciate  Endo. Continue prednisone 5 mg daily through 8/31 to prevent iatrogenic AI and then discontinue  - Continue Levothyroxine 50 mcg daily  - Repeat TSH/Free T4 level on 9/2, Endocrine will monitor peripherally until then

## 2022-08-29 NOTE — PLAN OF CARE
Pt maintained free from falls/ trauma/ injuries. All his wound and incision drsg CDI, wound vac intact. Pt denied pain or any discomfort. Pt is free from SOB on trach collar at 10L. LVAD free from LFA and drsg due today. Pt is on heparin drip going at 15 units, am Aptt 45.3 therapeutic. BG before bed was 109, am fasting BG was 81. Plan of care reviewed. Pt verbalized understanding. All questions and concerns addressed. Will continue to monitor.

## 2022-08-29 NOTE — PROGRESS NOTES
[facility] Step[facility]  Heart Transplant  Progress Note    Patient Name: Tim Richards  MRN: [id number]  Admission Date: 6/27/2022  Hospital Length of Stay: 63 days  Attending Physician: Roslyn Ragsdale DO  Primary Care Provider: Deyanira Booth MD  Principal Problem:Left ventricular assist device (LVAD) complication    Subjective:     Interval History: No acute events overnight.    Continuous Infusions:   dextrose 10 % in water (D10W)      heparin (porcine) in D5W 15 Units/kg/hr (08/28/22 1802)     Scheduled Meds:   amiodarone  400 mg Oral Daily    aspirin  81 mg Oral Daily    levalbuterol  0.63 mg Nebulization Q6H    levothyroxine  50 mcg Oral Before breakfast    magnesium oxide  400 mg Oral BID    mexiletine  250 mg Oral Q8H    mirtazapine  30 mg Oral QHS    ondansetron  4 mg Intravenous Once    pantoprazole  40 mg Oral Daily    polyethylene glycol  17 g Oral Daily    predniSONE  5 mg Oral Daily    senna-docusate 8.6-50 mg  1 tablet Oral Daily    warfarin  7.5 mg Oral Daily     PRN Meds:sodium chloride 0.9%, acetaminophen, ALPRAZolam, bisacodyL, dextrose 10 % in water (D10W), dextrose 10%, dextrose 10%, glucagon (human recombinant), glucose, guaiFENesin 100 mg/5 ml, HYDROmorphone, HYDROmorphone, hydrOXYzine HCL, meclizine, melatonin, ondansetron    Review of patient's allergies indicates:   Allergen Reactions    Lisinopril Anaphylaxis    Hydralazine analogues      Chronic constipation, impotence, dizziness     Objective:     Vital Signs (Most Recent):  Temp: 98.3 °F (36.8 °C) (08/29/22 0719)  Pulse: 60 (08/29/22 0912)  Resp: 18 (08/29/22 0912)  BP: (!) 80/0 (08/29/22 0719)  SpO2: 96 % (08/29/22 0719)   Vital Signs (24h Range):  Temp:  [97.7 °F (36.5 °C)-98.8 °F (37.1 °C)] 98.3 °F (36.8 °C)  Pulse:  [60-99] 60  Resp:  [16-20] 18  SpO2:  [95 %-99 %] 96 %  BP: (68-80)/(0) 80/0     Patient Vitals for the past 72 hrs (Last 3 readings):   Weight   08/29/22 0433 93.7 kg (206 lb 9.1 oz)    08/28/22 0336 93.7 kg (206 lb 9.1 oz)   08/27/22 0500 94 kg (207 lb 3.7 oz)       Body mass index is 27.25 kg/m².      Intake/Output Summary (Last 24 hours) at 8/29/2022 1013  Last data filed at 8/29/2022 0818  Gross per 24 hour   Intake 921 ml   Output 1500 ml   Net -579 ml            Telemetry: No significant arrhythmic events overnight.    Physical Exam  Vitals and nursing note reviewed.   Constitutional:       General: He is not in acute distress.     Appearance: Normal appearance. He is well-developed. He is not ill-appearing.   HENT:      Head: Normocephalic and atraumatic.      Ears:      Abel exam findings: Lateralizes left.     Right Rinne: AC > BC.     Left Rinne: AC > BC.     Nose: Nose normal.      Mouth/Throat:      Mouth: Mucous membranes are moist.      Comments: Tracheostomy site is clean and dry without drainage  Eyes:      Extraocular Movements: Extraocular movements intact.      Conjunctiva/sclera: Conjunctivae normal.      Pupils: Pupils are equal, round, and reactive to light.   Cardiovascular:      Rate and Rhythm: Normal rate and regular rhythm.      Pulses: Normal pulses.      Heart sounds: Normal heart sounds.      Comments: VAD hum can be heard, no significant JVD noted on exam  Pulmonary:      Effort: Pulmonary effort is normal.      Breath sounds: Normal breath sounds. No stridor.   Abdominal:      General: Abdomen is flat. Bowel sounds are normal. There is no distension.      Palpations: Abdomen is soft.      Tenderness: There is no abdominal tenderness. There is no guarding.   Musculoskeletal:         General: Normal range of motion.      Cervical back: Normal range of motion and neck supple. No rigidity.   Lymphadenopathy:      Cervical: No cervical adenopathy.   Skin:     General: Skin is warm and dry.      Capillary Refill: Capillary refill takes less than 2 seconds.   Neurological:      General: No focal deficit present.      Mental Status: He is alert and oriented to person,  place, and time.   Psychiatric:         Mood and Affect: Mood normal.         Behavior: Behavior normal.         Thought Content: Thought content normal.         Judgment: Judgment normal.       Significant Labs:  CBC:  Recent Labs   Lab 08/27/22  0511 08/28/22  0506 08/28/22  0719 08/29/22  0428   WBC 7.28 6.79  --  5.28   RBC 2.88* 2.79*  --  2.58*   HGB 7.9* 7.8* 7.1* 7.1*   HCT 27.1* 26.7*  --  24.5*    174  --  176   MCV 94 96  --  95   MCH 27.4 28.0  --  27.5   MCHC 29.2* 29.2*  --  29.0*       BNP:  Recent Labs   Lab 08/24/22  0448 08/26/22  0425 08/29/22 0428   * 210* 171*       CMP:  Recent Labs   Lab 08/24/22  0856 08/25/22  0338 08/26/22  0425 08/27/22  0511 08/28/22  0506 08/29/22  0428   GLU 68* 93   < > 81 91 72   CALCIUM 8.7 8.9   < > 9.0 8.9 9.1   ALBUMIN 2.2* 2.1*  --   --   --   --    PROT 5.9* 5.6*  --   --   --   --    * 134*   < > 130* 134* 133*   K 4.5 4.7   < > 4.6 4.8 4.7   CO2 28 28   < > 26 29 29   CL 99 100   < > 98 100 98   BUN 17 20   < > 16 15 15   CREATININE 1.1 1.2   < > 1.1 1.0 1.0   ALKPHOS 166* 159*  --   --   --   --    ALT 56* 53*  --   --   --   --    AST 32 29  --   --   --   --    BILITOT 1.3* 1.2*  --   --   --   --     < > = values in this interval not displayed.        Coagulation:   Recent Labs   Lab 08/27/22  0511 08/27/22  1208 08/28/22  0215 08/28/22  0506 08/29/22  0428   INR 1.5*  --   --  1.5* 1.8*   APTT 54.9*   < > 39.3* 40.7* 45.3*    < > = values in this interval not displayed.       LDH:  Recent Labs   Lab 08/27/22  0511 08/28/22  0506 08/29/22  0428   * 229 229       Microbiology:  Microbiology Results (last 7 days)       ** No results found for the last 168 hours. **            I have reviewed all pertinent labs within the past 24 hours.    Estimated Creatinine Clearance: 94.3 mL/min (based on SCr of 1 mg/dL).    Diagnostic Results:  I have reviewed all pertinent imaging results/findings within the past 24 hours.    Assessment and  Plan:     57 Y/O M with Hx of stage D HFrEF (EF 10%) due to NICM underwent HM2 implant 3/8/18 and exchange of outflow graft 3/9/18, Hx VT/VF s/p SICD 2014 presenting with gradual worsening shortness of breath associated with nonpleuritic, nonradiating bilateral 4/10 retrosternal chest pressure pain.  Symptoms began yesterday and have gradually worsened in the last 12 hours.  He does report going to a family picnic with increased consumption of chicken wings, ribs and other salty meat products.  Prior to symptom onset, he reports nausea, nonbloody diarrhea began yesterday and single episode of nonbloody nonbilious vomiting this morning. He also complains of dizziness, lightheadedness, and a mild HA. SOB worsened this morning prompting him to come to the ED.     In the ED, patient was in respiratory distress requiring BiPAP. MAP 96 and started on Nitro gtt. Work-up revealed WBC 10, K 5.4, Cr 2.5 (baseline 1.9), BNP 1950 (baseline 200-300s), Bili 2.1, LDH 1058, and INR 3.2. Bedside TTE with severely reduced EF, AV not opening, septum bulging into RV. Given IV Lasix 80 mg x1 with only 100-200 mL UOP and dark in color. HM2 interrogated and flows have been 8.5-9 L/min with no alarms or power surges. Cardiology consulted in the ED, dicussed with HTS, and decision made to admit to ICU for further mgmt.       * Left ventricular assist device (LVAD) complication  -The patient presented with COVID and found to have an uptrending LDH with increased power.  He underwent HM III upgrade on 7/13/22  -ASA 81mg  -INR goal 2-3. Subtherapeutic. Con hep gtt.   -BP controlled.  -Echo 8/23: EF 15%, LViDD 7.55, TAPSE 1.26, AV does not open. The ventricular septum is at midline.   -PT/OT/Speech. Will likely need rehab. Will need to downsize trach prior to rehab.     Procedure: Device Interrogation Including analysis of device parameters  Current Settings: Ventricular Assist Device    TXP LVAD INTERROGATIONS 8/29/2022 8/29/2022 8/28/2022  8/28/2022 8/28/2022 8/28/2022 8/28/2022   Type HeartMate3 HeartMate3 HeartMate3 HeartMate3 HeartMate3 HeartMate3 HeartMate3   Flow 5.4 5.5 5.5 5.5 5.5 5.4 5.2   Speed 6300 6300 6300 6300 6300 6300 6300   PI 3.4 2.4 1.7 1.9 1.7 2.3 3.7   Power (Jackson) 5.3 3.3 5.3 5.3 5.3 5.4 5.3   LSL - - - 5900 - - -   Low Flow Alarm - - - - - - -   High Power Alarm - - - - - - -   Pulsatility - - - Intermittent pulse - - No Pulse       History of ventricular tachycardia  Patient experienced an episode of VT on 6/30 and experienced an ICD shock thought to be related to hypokalemia (K of 3.1 on 6/30)  - Significant VT hx previously controlled with amio 400mg q d.   EP consulted for mexiletine dosing as pt was having significant nausea and dizziness.   -Meds changed to mexiletine 250mg TID  and amio increased to 400mg.    Acute on chronic combined systolic and diastolic congestive heart failure  -not on GDMT due to hypotension    Amiodarone-induced hyperthyroidism   -Appreciate  Endo. Continue prednisone 5 mg daily through 8/31 to prevent iatrogenic AI and then discontinue  - Continue Levothyroxine 50 mcg daily  - Repeat TSH/Free T4 level on 9/2, Endocrine will monitor peripherally until then    Hypoglycemia  - due to poor PO intake    Chronic combined systolic and diastolic heart failure  -Previously on HM2 but complicated by thrombosis related to COVID  -Underwent HM III upgrade on 7/13/22  -Volume status: Euvolemic, will monitor. No diuretics for now  -Chest tube removal, bronch, and old driveline removed 7/22  -Currently being bridged with heparin, continue coumadin to goal INR of 2-3  - Daily INRs      Loki Randall NP  Heart Transplant  [facility]

## 2022-08-29 NOTE — PT/OT/SLP PROGRESS
Physical Therapy Co-Treatment With OT    Patient Name:  Tim Richards   MRN:  [id number]    Recommendations:     Discharge Recommendations:  rehabilitation facility   Discharge Equipment Recommendations:  (will determine DME needs closer to discharge)   Barriers to discharge: Decreased caregiver support    Assessment:     Tim Richards is a 56 y.o. male admitted with a medical diagnosis of Acute on chronic combined systolic and diastolic congestive heart failure.  He presents with the following impairments/functional limitations:  weakness, impaired endurance, impaired functional mobility, gait instability, impaired balance, decreased lower extremity function. Pt tolerated treatment well but continued BLE weakness limits functional status. Pt will benefit from continue skilled PT 5 x/week to progress the pt physically. Pt would benefit from inpatient rehab when medically stable to maximize functional status. Pt is s/p LVAD pump exchange 7/22.    Rehab Prognosis: Good; patient would benefit from acute skilled PT services to address these deficits and reach maximum level of function.    Recent Surgery: Procedure(s) (LRB):  CREATION, TRACHEOSTOMY (N/A)  INSERTION, CENTRAL VENOUS ACCESS DEVICE 25 Days Post-Op    Plan:     During this hospitalization, patient to be seen 5 x/week to address the identified rehab impairments via gait training, therapeutic activities, therapeutic exercises, neuromuscular re-education and progress toward the following goals:    Plan of Care Expires:  09/18/22    Subjective     Chief Complaint: weakness of BLE  Patient/Family Comments/goals: return to prior function  Pain/Comfort:  Pain Rating 1: 0/10  Pain Rating Post-Intervention 1: 0/10      Objective:     Communicated with nurse prior to session.  Patient found supine with LVAD, telemetry, PICC line, Tracheostomy, oxygen, wound vac upon PT entry to room.     General Precautions: Standard, LVAD, fall   Orthopedic Precautions:N/A    Braces:    Respiratory Status: High flow, flow 8 L/min, concentration 35%     Functional Mobility:  Bed Mobility:  Pt needed verbal cues for hand placement and sequencing. Pt is no longer with sternal precautions.     Rolling Left:  stand by assistance  Supine to Sit: stand by assistance    Transfers:     Sit to Stand:  moderate assistance and of 2 persons with hand-held assist  Bed to Chair: maximal assistance x 2 with  hand-held assist  using  Squat Pivot  Balance: Pt standing balance min assist to perform ADLs with OT at bed side. Pt was unable to complete full task while standing hand to complete task at end of bed. Pt had bilateral hip and knee flexion during standing.    Due to pt complex medical condition, the skill of 2 licensed therapists is needed to maximize treatment session and progression towards goals        AM-PAC 6 CLICK MOBILITY  Turning over in bed (including adjusting bedclothes, sheets and blankets)?: 4  Sitting down on and standing up from a chair with arms (e.g., wheelchair, bedside commode, etc.): 2  Moving from lying on back to sitting on the side of the bed?: 4  Moving to and from a bed to a chair (including a wheelchair)?: 2  Need to walk in hospital room?: 1  Climbing 3-5 steps with a railing?: 1  Basic Mobility Total Score: 14       Therapeutic Activities and Exercises:     Pt performed BLE in sitting for toe raises, heel raises, and LAQ x 15 reps, marches BLE x 10 reps and 5 reps    PT verbally explained POC to pt and pt verbally expressed understanding of such.    Patient left up in chair with all lines intact and call button in reach..    GOALS:   Multidisciplinary Problems       Physical Therapy Goals          Problem: Physical Therapy    Goal Priority Disciplines Outcome Goal Variances Interventions   Physical Therapy Goal     PT, PT/OT Ongoing, Progressing     Description: Goals to be met by: 9/30/2022    Patient will increase functional independence with mobility by  performin. Supine to sit with MInimal Assistance -MET 2022  2. Sit to stand transfer with Contact Guard Assistance with AD if needed -not met  3. Gait  x 150 feet with Contact Guard Assistance with assistive device -not met  4. Ascend/descend 8 stair with right Handrails Contact Guard Assistance -not met  5. Sitting at edge of bed x15 minutes with Contact Guard Assistance to improve tolerance to activities. -met 2022  6. Lower extremity exercise program x15 reps with assistance as needed -not met  7. Supine to sit with independence  8.Pt will be able to stand with CGA to perform tasks and improve standing balance.                 Multidisciplinary Problems (Resolved)          Problem: Physical Therapy    Goal Priority Disciplines Outcome Goal Variances Interventions   Physical Therapy Goal   (Resolved)     PT, PT/OT Met     Description: The patient is near his functional baseline, he ambulated within the room with no AD and independence. Unable to do stair training, assess gait endurance due to COVID restrictions. He is safe to return home with no therapy needs. He is in agreement with PT discharge, he states he is confident he can ascend/descend his stairs.           Problem: Physical Therapy    Goal Priority Disciplines Outcome Goal Variances Interventions   Physical Therapy Goal   (Resolved)     PT, PT/OT Met                         Time Tracking:     PT Received On: 22  PT Start Time: 1059     PT Stop Time: 1132  PT Total Time (min): 33 min     Billable Minutes: Therapeutic Activity 22 minutes and Therapeutic Exercise 11 minutes    Treatment Type: Treatment  PT/PTA: PT     PTA Visit Number: 0     2022

## 2022-08-29 NOTE — PLAN OF CARE
56 year-old  male with NICM with LVAD, s/p ICD for VT on amiodarone and mexiletine and AIT followed by hypothyroidism initially admitted 6/27/2022 with COVID respiratory failure complicated with LVAD pump thrombosis that was refractory to medical therapy. Underwent LVAD pump exchange. Post-op course complicated by worsening cardiogenic shock/RV failure requiring VA-ECMO. Improved clinically underwent successful decannulation. Continued episodes of VT with ICD shock 7/21 and ongoing anti-arrhythmic mediation adjustments. TFTs on 7/27 significant for recurrent hyperthyroidism with undetectable TSH and elevated fT4.    - Endocrinology consulted for recurrent AIT    Regarding AIT:    - Last seen outpatient by Dr. 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 He required a prolonged course of prednisone and methimazole, then subsequently developed hypothyroidism in May 2020. LT4 was adjusted based on serial TFT measurements. Most recently levothyroxine dose has been 112 mcg     - He self-decreased his amiodarone dose to 200 mg daily about 6 months ago. T4 was high on 7/13, but he was continued on levothyroxine 112 mcg    Lab Results   Component Value Date    TSH 10.346 (H) 08/26/2022    TSH 3.940 08/21/2022    TSH 0.127 (L) 08/14/2022    FREET4 0.87 08/26/2022    FREET4 0.93 08/21/2022    FREET4 0.98 08/19/2022      Latest Reference Range & Units 08/07/22 03:42 08/08/22 03:10 08/09/22 03:29 08/10/22 03:33   Free T4 0.71 - 1.51 ng/dL 2.51 (H) 2.43 (H) 2.23 (H) 2.12 (H)       Amiodarone-induced Hyperthyroidism  - Recently been hypothyroid on labs  - Continue prednisone 5 mg daily through 8/31 to prevent iatrogenic AI and then discontinue  - Continue Levothyroxine 50 mcg daily  - Repeat TSH/Free T4 level on 9/2, Endocrine will monitor peripherally until then    Amiodarone-induced Hypothyroidism  - Plan as above    Hypoglycemia  -  Resolved, continue to monitor fingersticks      Akira Zuñiga DO  [facility], [address]th Floor  [address] [place], [address]    Office: [phone]  Fax: [phone]    Disclaimer: This note has been generated using voice-recognition software. There may be typographical errors that have been missed during proof-reading.    The above history labs imaging impression and plan were discussed with attending physician who is in agreement and also took part in this patient's care.  I personally reviewed all of the patients available medications, labs, imaging, vitals, allergies, medical history.

## 2022-08-29 NOTE — PLAN OF CARE
Problem: Adult Inpatient Plan of Care  Goal: Patient-Specific Goal (Individualized)  Outcome: Ongoing, Progressing  Flowsheets (Taken 8/29/2022 1036)  Anxieties, Fears or Concerns: None voiced  Individualized Care Needs:   Monitor LVAD #. Perform LVAD dressing with kit; due 08/30. Perform trach care/ suction. Monitor aptt daily for Heparin gtt infusing@15u/kg/hr.  Patient-Specific Goals (Include Timeframe): Pt will be free of falls and injuries throughout my shift.    Plan of care discussed with patient.  Patient ambulating independently, fall precautions in place. LVAD DP and numbers WNL, smooth LVAD hum. Patient has no complaints of pain. Discussed medications and care.  Patient has no questions at this time. Will continue to monitor throughout my shift.

## 2022-08-29 NOTE — RESPIRATORY THERAPY
RAPID RESPONSE RESPIRATORY THERAPY PROACTIVE NOTE           Time of visit: 813     Code Status: Full Code   : 1965  Bed: 311/311 A:   MRN: [id number]  Time spent at the bedside: < 15 min    SITUATION    Evaluated patient for: LDA Check     BACKGROUND    Patient has a past medical history of A-fib, Anticoagulant long-term use, Atrial flutter, CHF (congestive heart failure), Class 1 obesity due to excess calories with serious comorbidity and body mass index (BMI) of 31.0 to 31.9 in adult, Class 1 obesity due to excess calories with serious comorbidity and body mass index (BMI) of 32.0 to 32.9 in adult, Dilated cardiomyopathy, Disorder of kidney and ureter, Encounter for blood transfusion, Essential hypertension, Gout, HTN (hypertension), psychiatric care, ICD (implantable cardioverter-defibrillator) infection, Psychiatric problem, Thyroid disease, and Ventricular tachycardia (paroxysmal).  Clinically Significant Surgical Hx: tracheostomy    24 Hours Vitals Range:  Temp:  [97.7 °F (36.5 °C)-98.8 °F (37.1 °C)]   Pulse:  [71-99]   Resp:  [16-20]   BP: (68-80)/(0)   SpO2:  [95 %-99 %]     Labs:    Recent Labs     22  0511 22  0506 22  0428   * 134* 133*   K 4.6 4.8 4.7   CL 98 100 98   CO2 26 29 29   CREATININE 1.1 1.0 1.0   GLU 81 91 72   PHOS 3.0 3.6 3.3   MG 2.1 2.0 2.1        No results for input(s): PH, PCO2, PO2, HCO3, POCSATURATED, BE in the last 72 hours.    ASSESSMENT/INTERVENTIONS  Pt resing and all trach supplies are at bedside      Last VS   Temp: 98.3 °F (36.8 °C) (719)  Pulse: 77 (719)  Resp: 18 (719)  BP: 80/0 (719)  SpO2: 96 % (719)      Extra trachs at bedside: 6.0 and 8.0 both shiley cuffed  Level of Consciousness: Level of Consciousness (AVPU): alert  Respiratory Effort: Respiratory Effort: Unlabored Expansion/Accessory Muscle Usage: Expansion/Accessory Muscles/Retractions: expansion symmetric, no retractions, no use of accessory  muscles  All Lung Field Breath Sounds: All Lung Fields Breath Sounds: Anterior:, Lateral:, coarse, rhonchi  SHERWIN Breath Sounds: Anterior:, Posterior:, Lateral:, clear  LLL Breath Sounds: Posterior:, diminished  RUL Breath Sounds: Anterior:, Posterior:, Lateral:, diminished  RML Breath Sounds: Anterior:, Posterior:, Lateral:, diminished  RLL Breath Sounds: Posterior:, diminished  O2 Device/Concentration: TC 10L 40%  Surgical airway: Yes, Type: Shiley Size: 8, cuffed    Ambu at bedside: Ambu bag with the patient?: Yes, Adult Ambu     Active Orders   Respiratory Care    Chest physiotherapy Q6H PRN     Frequency: Q6H PRN     Number of Occurrences: Until Specified     Order Questions:      Indications: COPIOUS SPUTUM PRODUCTION    Inhalation Treatment Q6H     Frequency: Q6H     Number of Occurrences: Until Specified    Oxygen Continuous     Frequency: Continuous     Number of Occurrences: Until Specified     Order Questions:      Device type: Low flow      Device: Trach Collar      FiO2%: 40      Titrate O2 per Oxygen Titration Protocol: Yes      To maintain SpO2 goal of: >= 90%      Notify MD of: Inability to achieve desired SpO2; Sudden change in patient status and requires 20% increase in FiO2; Patient requires >60% FiO2    Pulse Oximetry Q4H     Frequency: Q4H     Number of Occurrences: Until Specified    Respiratory care evaluation only Once     Frequency: Once     Number of Occurrences: 1 Occurrences     Order Comments: Consult for tracheostomy exchange/downsize      Routine tracheostomy care     Frequency: BID     Number of Occurrences: Until Specified       RECOMMENDATIONS    We recommend: RRT Recs: Continue POC per primary team.      FOLLOW-UP    Please call back the Rapid Response RTLaura RRT at x [phone] for any questions or concerns.

## 2022-08-29 NOTE — PLAN OF CARE
Problem: Physical Therapy  Goal: Physical Therapy Goal  Description: Goals to be met by: 2022    Patient will increase functional independence with mobility by performin. Supine to sit with MInimal Assistance -MET 2022  2. Sit to stand transfer with Contact Guard Assistance with AD if needed -not met  3. Gait  x 150 feet with Contact Guard Assistance with assistive device -not met  4. Ascend/descend 8 stair with right Handrails Contact Guard Assistance -not met  5. Sitting at edge of bed x15 minutes with Contact Guard Assistance to improve tolerance to activities. -met 2022  6. Lower extremity exercise program x15 reps with assistance as needed -not met  7. Supine to sit with independence  8.Pt will be able to stand with CGA to perform tasks and improve standing balance.  Outcome: Ongoing, Progressing   Goals adjusted for time frame and content and are appropriate. 2022

## 2022-08-29 NOTE — ASSESSMENT & PLAN NOTE
-The patient presented with COVID and found to have an uptrending LDH with increased power.  He underwent HM III upgrade on 7/13/22  -ASA 81mg  -INR goal 2-3. Subtherapeutic. Con hep gtt.   -BP controlled.  -Echo 8/23: EF 15%, LViDD 7.55, TAPSE 1.26, AV does not open. The ventricular septum is at midline.   -PT/OT/Speech. Will likely need rehab. Will need to downsize trach prior to rehab.     Procedure: Device Interrogation Including analysis of device parameters  Current Settings: Ventricular Assist Device    TXP LVAD INTERROGATIONS 8/29/2022 8/29/2022 8/28/2022 8/28/2022 8/28/2022 8/28/2022 8/28/2022   Type HeartMate3 HeartMate3 HeartMate3 HeartMate3 HeartMate3 HeartMate3 HeartMate3   Flow 5.4 5.5 5.5 5.5 5.5 5.4 5.2   Speed 6300 6300 6300 6300 6300 6300 6300   PI 3.4 2.4 1.7 1.9 1.7 2.3 3.7   Power (Jackson) 5.3 3.3 5.3 5.3 5.3 5.4 5.3   LSL - - - 5900 - - -   Low Flow Alarm - - - - - - -   High Power Alarm - - - - - - -   Pulsatility - - - Intermittent pulse - - No Pulse

## 2022-08-29 NOTE — ASSESSMENT & PLAN NOTE
- Continue prednisone 5 mg daily through 8/31 to prevent iatrogenic AI and then discontinue  - Continue Levothyroxine 50 mcg daily  - Repeat TSH/Free T4 level on 9/2

## 2022-08-30 PROBLEM — Z74.09 IMPAIRED MOBILITY: Status: ACTIVE | Noted: 2022-08-30

## 2022-08-30 PROBLEM — I50.43 ACUTE ON CHRONIC COMBINED SYSTOLIC AND DIASTOLIC HEART FAILURE: Status: ACTIVE | Noted: 2022-08-30

## 2022-08-30 LAB
ANION GAP SERPL CALC-SCNC: 5 MMOL/L (ref 8–16)
APTT BLDCRRT: 60.6 SEC (ref 21–32)
ASCENDING AORTA: 3.23 CM
BASOPHILS # BLD AUTO: 0 K/UL (ref 0–0.2)
BASOPHILS NFR BLD: 0 % (ref 0–1.9)
BSA FOR ECHO PROCEDURE: 2.18 M2
BUN SERPL-MCNC: 16 MG/DL (ref 6–20)
CALCIUM SERPL-MCNC: 9.1 MG/DL (ref 8.7–10.5)
CHLORIDE SERPL-SCNC: 99 MMOL/L (ref 95–110)
CO2 SERPL-SCNC: 30 MMOL/L (ref 23–29)
CREAT SERPL-MCNC: 0.9 MG/DL (ref 0.5–1.4)
CV ECHO LV RWT: 0.21 CM
DIFFERENTIAL METHOD: ABNORMAL
DOP CALC LVOT AREA: 5.3 CM2
DOP CALC LVOT DIAMETER: 2.6 CM
ECHO LV POSTERIOR WALL: 0.79 CM (ref 0.6–1.1)
EJECTION FRACTION: 10 %
EOSINOPHIL # BLD AUTO: 0.1 K/UL (ref 0–0.5)
EOSINOPHIL NFR BLD: 2.3 % (ref 0–8)
ERYTHROCYTE [DISTWIDTH] IN BLOOD BY AUTOMATED COUNT: 17.5 % (ref 11.5–14.5)
EST. GFR  (NO RACE VARIABLE): >60 ML/MIN/1.73 M^2
FRACTIONAL SHORTENING: 7 % (ref 28–44)
GLUCOSE SERPL-MCNC: 104 MG/DL (ref 70–110)
HCT VFR BLD AUTO: 23.8 % (ref 40–54)
HGB BLD-MCNC: 7.1 G/DL (ref 14–18)
IMM GRANULOCYTES # BLD AUTO: 0.05 K/UL (ref 0–0.04)
IMM GRANULOCYTES NFR BLD AUTO: 1 % (ref 0–0.5)
INR PPP: 1.9 (ref 0.8–1.2)
INTERVENTRICULAR SEPTUM: 0.75 CM (ref 0.6–1.1)
LA MAJOR: 6.63 CM
LA MINOR: 6.23 CM
LA WIDTH: 4.95 CM
LDH SERPL L TO P-CCNC: 285 U/L (ref 110–260)
LEFT ATRIUM SIZE: 4.94 CM
LEFT ATRIUM VOLUME INDEX MOD: 52.2 ML/M2
LEFT ATRIUM VOLUME INDEX: 61.5 ML/M2
LEFT ATRIUM VOLUME MOD: 113.38 CM3
LEFT ATRIUM VOLUME: 133.52 CM3
LEFT INTERNAL DIMENSION IN SYSTOLE: 6.94 CM (ref 2.1–4)
LEFT VENTRICLE DIASTOLIC VOLUME INDEX: 134.86 ML/M2
LEFT VENTRICLE DIASTOLIC VOLUME: 292.64 ML
LEFT VENTRICLE MASS INDEX: 120 G/M2
LEFT VENTRICLE SYSTOLIC VOLUME INDEX: 115.3 ML/M2
LEFT VENTRICLE SYSTOLIC VOLUME: 250.21 ML
LEFT VENTRICULAR INTERNAL DIMENSION IN DIASTOLE: 7.44 CM (ref 3.5–6)
LEFT VENTRICULAR MASS: 260.45 G
LYMPHOCYTES # BLD AUTO: 0.6 K/UL (ref 1–4.8)
LYMPHOCYTES NFR BLD: 11.9 % (ref 18–48)
MAGNESIUM SERPL-MCNC: 2.3 MG/DL (ref 1.6–2.6)
MCH RBC QN AUTO: 27.4 PG (ref 27–31)
MCHC RBC AUTO-ENTMCNC: 29.8 G/DL (ref 32–36)
MCV RBC AUTO: 92 FL (ref 82–98)
MONOCYTES # BLD AUTO: 0.5 K/UL (ref 0.3–1)
MONOCYTES NFR BLD: 8.8 % (ref 4–15)
NEUTROPHILS # BLD AUTO: 3.9 K/UL (ref 1.8–7.7)
NEUTROPHILS NFR BLD: 76 % (ref 38–73)
NRBC BLD-RTO: 0 /100 WBC
PHOSPHATE SERPL-MCNC: 3.3 MG/DL (ref 2.7–4.5)
PISA TR MAX VEL: 2.64 M/S
PLATELET # BLD AUTO: 167 K/UL (ref 150–450)
PMV BLD AUTO: 9.5 FL (ref 9.2–12.9)
POCT GLUCOSE: 102 MG/DL (ref 70–110)
POCT GLUCOSE: 104 MG/DL (ref 70–110)
POCT GLUCOSE: 80 MG/DL (ref 70–110)
POTASSIUM SERPL-SCNC: 4.6 MMOL/L (ref 3.5–5.1)
PROTHROMBIN TIME: 19.2 SEC (ref 9–12.5)
RA MAJOR: 6.1 CM
RA PRESSURE: 3 MMHG
RA WIDTH: 4.45 CM
RBC # BLD AUTO: 2.59 M/UL (ref 4.6–6.2)
RIGHT VENTRICULAR END-DIASTOLIC DIMENSION: 4.3 CM
RV TISSUE DOPPLER FREE WALL SYSTOLIC VELOCITY 1 (APICAL 4 CHAMBER VIEW): 11.8 CM/S
SINUS: 3.25 CM
SODIUM SERPL-SCNC: 134 MMOL/L (ref 136–145)
STJ: 2.87 CM
TDI LATERAL: 0.06 M/S
TDI SEPTAL: 0.03 M/S
TDI: 0.05 M/S
TR MAX PG: 28 MMHG
TRICUSPID ANNULAR PLANE SYSTOLIC EXCURSION: 1.55 CM
TV REST PULMONARY ARTERY PRESSURE: 31 MMHG
WBC # BLD AUTO: 5.14 K/UL (ref 3.9–12.7)

## 2022-08-30 PROCEDURE — 83615 LACTATE (LD) (LDH) ENZYME: CPT | Performed by: INTERNAL MEDICINE

## 2022-08-30 PROCEDURE — 63600175 PHARM REV CODE 636 W HCPCS: Performed by: STUDENT IN AN ORGANIZED HEALTH CARE EDUCATION/TRAINING PROGRAM

## 2022-08-30 PROCEDURE — 25000242 PHARM REV CODE 250 ALT 637 W/ HCPCS

## 2022-08-30 PROCEDURE — 99222 1ST HOSP IP/OBS MODERATE 55: CPT | Mod: ,,, | Performed by: NURSE PRACTITIONER

## 2022-08-30 PROCEDURE — 99900035 HC TECH TIME PER 15 MIN (STAT)

## 2022-08-30 PROCEDURE — 27000221 HC OXYGEN, UP TO 24 HOURS

## 2022-08-30 PROCEDURE — 94640 AIRWAY INHALATION TREATMENT: CPT

## 2022-08-30 PROCEDURE — 85730 THROMBOPLASTIN TIME PARTIAL: CPT | Performed by: INTERNAL MEDICINE

## 2022-08-30 PROCEDURE — 80048 BASIC METABOLIC PNL TOTAL CA: CPT | Performed by: INTERNAL MEDICINE

## 2022-08-30 PROCEDURE — 20600001 HC STEP DOWN PRIVATE ROOM

## 2022-08-30 PROCEDURE — 99233 SBSQ HOSP IP/OBS HIGH 50: CPT | Mod: ,,, | Performed by: INTERNAL MEDICINE

## 2022-08-30 PROCEDURE — 93750 PR INTERROGATE VENT ASSIST DEV, IN PERSON, W PHYSICIAN ANALYSIS: ICD-10-PCS | Mod: ,,, | Performed by: INTERNAL MEDICINE

## 2022-08-30 PROCEDURE — 97116 GAIT TRAINING THERAPY: CPT

## 2022-08-30 PROCEDURE — 27000685 HC LVAD KIT (30 DAY SUPPLY)

## 2022-08-30 PROCEDURE — 25000003 PHARM REV CODE 250: Performed by: NURSE PRACTITIONER

## 2022-08-30 PROCEDURE — 99900026 HC AIRWAY MAINTENANCE (STAT)

## 2022-08-30 PROCEDURE — 94799 UNLISTED PULMONARY SVC/PX: CPT

## 2022-08-30 PROCEDURE — 25000003 PHARM REV CODE 250: Performed by: STUDENT IN AN ORGANIZED HEALTH CARE EDUCATION/TRAINING PROGRAM

## 2022-08-30 PROCEDURE — 83735 ASSAY OF MAGNESIUM: CPT | Performed by: STUDENT IN AN ORGANIZED HEALTH CARE EDUCATION/TRAINING PROGRAM

## 2022-08-30 PROCEDURE — 93750 INTERROGATION VAD IN PERSON: CPT | Mod: ,,, | Performed by: INTERNAL MEDICINE

## 2022-08-30 PROCEDURE — 97535 SELF CARE MNGMENT TRAINING: CPT

## 2022-08-30 PROCEDURE — 97530 THERAPEUTIC ACTIVITIES: CPT

## 2022-08-30 PROCEDURE — 99233 PR SUBSEQUENT HOSPITAL CARE,LEVL III: ICD-10-PCS | Mod: ,,, | Performed by: INTERNAL MEDICINE

## 2022-08-30 PROCEDURE — 99222 PR INITIAL HOSPITAL CARE,LEVL II: ICD-10-PCS | Mod: ,,, | Performed by: NURSE PRACTITIONER

## 2022-08-30 PROCEDURE — 94761 N-INVAS EAR/PLS OXIMETRY MLT: CPT

## 2022-08-30 PROCEDURE — 85610 PROTHROMBIN TIME: CPT | Performed by: STUDENT IN AN ORGANIZED HEALTH CARE EDUCATION/TRAINING PROGRAM

## 2022-08-30 PROCEDURE — 94668 MNPJ CHEST WALL SBSQ: CPT

## 2022-08-30 PROCEDURE — 99900022

## 2022-08-30 PROCEDURE — 85025 COMPLETE CBC W/AUTO DIFF WBC: CPT | Performed by: STUDENT IN AN ORGANIZED HEALTH CARE EDUCATION/TRAINING PROGRAM

## 2022-08-30 PROCEDURE — 25000003 PHARM REV CODE 250: Performed by: INTERNAL MEDICINE

## 2022-08-30 PROCEDURE — 84100 ASSAY OF PHOSPHORUS: CPT | Performed by: STUDENT IN AN ORGANIZED HEALTH CARE EDUCATION/TRAINING PROGRAM

## 2022-08-30 PROCEDURE — 99900031 HC PATIENT EDUCATION (STAT)

## 2022-08-30 PROCEDURE — 27200966 HC CLOSED SUCTION SYSTEM

## 2022-08-30 PROCEDURE — 27000248 HC VAD-ADDITIONAL DAY

## 2022-08-30 RX ORDER — TORSEMIDE 20 MG/1
20 TABLET ORAL DAILY
Status: DISCONTINUED | OUTPATIENT
Start: 2022-08-30 | End: 2022-09-01 | Stop reason: HOSPADM

## 2022-08-30 RX ADMIN — MEXILETINE HYDROCHLORIDE 250 MG: 250 CAPSULE ORAL at 09:08

## 2022-08-30 RX ADMIN — LEVALBUTEROL HYDROCHLORIDE 0.63 MG: 0.63 SOLUTION RESPIRATORY (INHALATION) at 07:08

## 2022-08-30 RX ADMIN — PANTOPRAZOLE SODIUM 40 MG: 40 TABLET, DELAYED RELEASE ORAL at 09:08

## 2022-08-30 RX ADMIN — MEXILETINE HYDROCHLORIDE 250 MG: 250 CAPSULE ORAL at 06:08

## 2022-08-30 RX ADMIN — MEXILETINE HYDROCHLORIDE 250 MG: 250 CAPSULE ORAL at 02:08

## 2022-08-30 RX ADMIN — PREDNISONE 5 MG: 2.5 TABLET ORAL at 09:08

## 2022-08-30 RX ADMIN — Medication 400 MG: at 09:08

## 2022-08-30 RX ADMIN — ASPIRIN 81 MG CHEWABLE TABLET 81 MG: 81 TABLET CHEWABLE at 09:08

## 2022-08-30 RX ADMIN — AMIODARONE HYDROCHLORIDE 400 MG: 200 TABLET ORAL at 09:08

## 2022-08-30 RX ADMIN — WARFARIN SODIUM 7.5 MG: 7.5 TABLET ORAL at 05:08

## 2022-08-30 RX ADMIN — TORSEMIDE 20 MG: 20 TABLET ORAL at 09:08

## 2022-08-30 RX ADMIN — MIRTAZAPINE 30 MG: 15 TABLET, FILM COATED ORAL at 09:08

## 2022-08-30 RX ADMIN — LEVOTHYROXINE SODIUM 50 MCG: 50 TABLET ORAL at 06:08

## 2022-08-30 NOTE — RESPIRATORY THERAPY
RAPID RESPONSE RESPIRATORY THERAPY PROACTIVE NOTE           Time of visit: 811     Code Status: Full Code   : 1965  Bed: 311/311 A:   MRN: [id number]  Time spent at the bedside: < 15 min    SITUATION    Evaluated patient for: LDA Check     BACKGROUND    Patient has a past medical history of A-fib, Anticoagulant long-term use, Atrial flutter, CHF (congestive heart failure), Class 1 obesity due to excess calories with serious comorbidity and body mass index (BMI) of 31.0 to 31.9 in adult, Class 1 obesity due to excess calories with serious comorbidity and body mass index (BMI) of 32.0 to 32.9 in adult, Dilated cardiomyopathy, Disorder of kidney and ureter, Encounter for blood transfusion, Essential hypertension, Gout, HTN (hypertension), psychiatric care, ICD (implantable cardioverter-defibrillator) infection, Psychiatric problem, Thyroid disease, and Ventricular tachycardia (paroxysmal).  Clinically Significant Surgical Hx: tracheostomy    24 Hours Vitals Range:  Temp:  [97.7 °F (36.5 °C)-98.6 °F (37 °C)]   Pulse:  []   Resp:  [16-18]   BP: (66-70)/(0)   SpO2:  [90 %-99 %]     Labs:    Recent Labs     22  0506 22  0428 22  0454   * 133* 134*   K 4.8 4.7 4.6    98 99   CO2 29 29 30*   CREATININE 1.0 1.0 0.9   GLU 91 72 104   PHOS 3.6 3.3 3.3   MG 2.0 2.1 2.3        No results for input(s): PH, PCO2, PO2, HCO3, POCSATURATED, BE in the last 72 hours.    ASSESSMENT/INTERVENTIONS  Pt resting and all trach supplies are at bedside      Last VS   Temp: 98.3 °F (36.8 °C) ( 0716)  Pulse: 82 ( 0759)  Resp: 18 (759)  BP: 70/0 ( 0443)  SpO2: 97 % (759)      Extra trachs at bedside: 6.0 and 8.0 both shiley cuffed  Level of Consciousness: Level of Consciousness (AVPU): alert  Respiratory Effort: Respiratory Effort: Unlabored Expansion/Accessory Muscle Usage: Expansion/Accessory Muscles/Retractions: no retractions, no use of accessory muscles  All Lung Field  Breath Sounds: All Lung Fields Breath Sounds: coarse, diminished  SHERWIN Breath Sounds: Anterior:, Posterior:, Lateral:, clear  LLL Breath Sounds: Posterior:, diminished  RUL Breath Sounds: Anterior:, Posterior:, Lateral:, diminished  RML Breath Sounds: Anterior:, Posterior:, Lateral:, diminished  RLL Breath Sounds: Posterior:, diminished  O2 Device/Concentration: TC 8L 40%  Surgical airway: Yes, Type: Shiley Size: 8, cuffed  Ambu at bedside: Ambu bag with the patient?: Yes, Adult Ambu     Active Orders   Respiratory Care    Chest physiotherapy Q6H PRN     Frequency: Q6H PRN     Number of Occurrences: Until Specified     Order Questions:      Indications: COPIOUS SPUTUM PRODUCTION    Inhalation Treatment Q6H     Frequency: Q6H     Number of Occurrences: Until Specified    Oxygen Continuous     Frequency: Continuous     Number of Occurrences: Until Specified     Order Questions:      Device type: Low flow      Device: Trach Collar      FiO2%: 40      Titrate O2 per Oxygen Titration Protocol: Yes      To maintain SpO2 goal of: >= 90%      Notify MD of: Inability to achieve desired SpO2; Sudden change in patient status and requires 20% increase in FiO2; Patient requires >60% FiO2    Pulse Oximetry Q4H     Frequency: Q4H     Number of Occurrences: Until Specified    Respiratory care evaluation only Once     Frequency: Once     Number of Occurrences: 1 Occurrences     Order Comments: Consult for tracheostomy exchange/downsize      Routine tracheostomy care     Frequency: BID     Number of Occurrences: Until Specified       RECOMMENDATIONS    We recommend: RRT Recs: Continue POC per primary team.      FOLLOW-UP    Please call back the Rapid Response RTLaura RRT at x [phone] for any questions or concerns.

## 2022-08-30 NOTE — PT/OT/SLP PROGRESS
Physical Therapy Treatment    Patient Name:  Tim Richards   MRN:  [id number]  Admit Date: 6/27/2022  Admitting Diagnosis:  LVAD (left ventricular assist device) present   Length of Stay: 64 days  Recent Surgery: Procedure(s) (LRB):  CREATION, TRACHEOSTOMY (N/A)  INSERTION, CENTRAL VENOUS ACCESS DEVICE 26 Days Post-Op    Recommendations:     Discharge Recommendations:  rehabilitation facility   Discharge Equipment Recommendations:  (TBD)   Barriers to discharge: None    Plan:     During this hospitalization, patient to be seen 5 x/week to address the listed problems via gait training, therapeutic activities, therapeutic exercises, neuromuscular re-education  Plan of Care Expires:  09/18/22  Plan of Care Reviewed with: patient    Assessment:     Tim Richards is a 56 y.o. male admitted with a medical diagnosis of LVAD (left ventricular assist device) present. Pt progressing towards goals, but not at PLOF. Pt tolerated session well but continues to require maximum encouragement to progress mobility. Pt working through self limiting tenancies. Pt is improving with therapy evidenced by increased gait distance and improved gait quality. Pt would benefit from continued PT services to improve overall functional mobility. Pt is an excellent IP rehab candidate due to good activity tolerance, decline from PLOF, good family support, and excellent motivation. Recommend d/c to Rehab to maximize functional independence.        Problem List: weakness, impaired endurance, impaired functional mobility, gait instability, impaired balance, decreased safety awareness, impaired cardiopulmonary response to activity.  Rehab Prognosis: Good     GOALS:   Multidisciplinary Problems       Physical Therapy Goals          Problem: Physical Therapy    Goal Priority Disciplines Outcome Goal Variances Interventions   Physical Therapy Goal     PT, PT/OT Ongoing, Progressing     Description: Goals to be met by: 9/30/2022    Patient will  increase functional independence with mobility by performin. Supine to sit with MInimal Assistance -MET 2022  2. Sit to stand transfer with Contact Guard Assistance with AD if needed -not met  3. Gait  x 150 feet with Contact Guard Assistance with assistive device -not met  4. Ascend/descend 8 stair with right Handrails Contact Guard Assistance -not met  5. Sitting at edge of bed x15 minutes with Contact Guard Assistance to improve tolerance to activities. -met 2022  6. Lower extremity exercise program x15 reps with assistance as needed -not met  7. Supine to sit with independence  8.Pt will be able to stand with CGA to perform tasks and improve standing balance.                 Multidisciplinary Problems (Resolved)          Problem: Physical Therapy    Goal Priority Disciplines Outcome Goal Variances Interventions   Physical Therapy Goal   (Resolved)     PT, PT/OT Met     Description: The patient is near his functional baseline, he ambulated within the room with no AD and independence. Unable to do stair training, assess gait endurance due to COVID restrictions. He is safe to return home with no therapy needs. He is in agreement with PT discharge, he states he is confident he can ascend/descend his stairs.           Problem: Physical Therapy    Goal Priority Disciplines Outcome Goal Variances Interventions   Physical Therapy Goal   (Resolved)     PT, PT/OT Met                         Subjective   Communicated with RN prior to session.  Patient found HOB elevated upon PT entry to room, agreeable to evaluation. Tim Richards's alone during session.    Chief Complaint: debility   Patient/Family Comments/goals: to get better  Pain/Comfort:  Pain Rating 1: 0/10  Pain Rating Post-Intervention 1: 0/10    Objective:   Patient found with: telemetry, Tracheostomy, oxygen, PICC line, LVAD   General Precautions: Standard, Cardiac fall, LVAD   Orthopedic Precautions:N/A   Braces: N/A   Oxygen Device: Trach  "Collar   Vitals: BP (!) 74/0 (BP Location: Right arm, Patient Position: Lying)   Pulse 83   Temp 98.9 °F (37.2 °C) (Oral)   Resp 18   Ht 6' 1" (1.854 m)   Wt 92.4 kg (203 lb 11.2 oz)   SpO2 97%   BMI 26.87 kg/m²     Outcome Measures:  AM-PAC 6 CLICK MOBILITY  Turning over in bed (including adjusting bedclothes, sheets and blankets)?: 4  Sitting down on and standing up from a chair with arms (e.g., wheelchair, bedside commode, etc.): 2  Moving from lying on back to sitting on the side of the bed?: 4  Moving to and from a bed to a chair (including a wheelchair)?: 2  Need to walk in hospital room?: 2  Climbing 3-5 steps with a railing?: 1  Basic Mobility Total Score: 15     Functional Mobility:  Additional staff present: OT - due to pt requiring 2 skilled therapists to safely perform functional mobility and to accommodate pt's activity tolerance  Bed Mobility:   Supine to Sit: stand by assistance; HOB elevated  Scooting anteriorly to EOB to have both feet planted on floor: stand by assistance  Sit to Supine: stand by assistance; HOB flat    Sitting Balance at Edge of Bed:  Assistance Level Required: Stand-by Assistance  Time: 12 minutes  Comments:   Worked on activity tolerance sitting EOB and worked on tolerance to positional change     Transfers:   Sit <> Stand Transfer: minimum assistance and of 2 persons with rolling walker from EOB x 3 trials       Gait:  Patient ambulated: 5 steps forward/backward + 5 steps forward/backward + 2 side steps to left   Seated rest break between trials 1 and 2  Seated rest break and standing ADLs between trials 2 and 3   Patient required: minimal assist and of 2 persons  Patient used: rolling walker  Gait Deviation(s): unsteady gait, decreased step length, narrow base of support, flexed posture, and decreased arminda  Impairments due to: impaired balance, decreased strength, and decreased endurance  Comments:   Verbal cuing to increase step length, upright posture, and for RW " management   Pt expressed increased frustration and anxiety during ambulation trial 2; provided support and encouragement to continue progressing mobility   Facilitation of B weight shifting and hip and thoracic extension       Therapeutic Activities, Exercises, and Education:   Educated pt on PT role/POC  Educated pt on importance of OOB activity and daily ambulation   Pt verbalized understanding         Patient left HOB elevated with all lines intact, call button in reach, and RN notified..    Time Tracking:     PT Received On: 08/30/22  PT Start Time: 0943     PT Stop Time: 1006  PT Total Time (min): 23 min       Billable Minutes:   Gait Training 15 and Therapeutic Activity 8    Treatment Type: Treatment  PT/PTA: PT

## 2022-08-30 NOTE — SUBJECTIVE & OBJECTIVE
Past Medical History:   Diagnosis Date    A-fib     Anticoagulant long-term use     Atrial flutter 7/30/2022    CHF (congestive heart failure)     Class 1 obesity due to excess calories with serious comorbidity and body mass index (BMI) of 31.0 to 31.9 in adult     Class 1 obesity due to excess calories with serious comorbidity and body mass index (BMI) of 32.0 to 32.9 in adult     Dilated cardiomyopathy 1/10/2018    Disorder of kidney and ureter     CKD    Encounter for blood transfusion     Essential hypertension 8/28/2022    Gout     HTN (hypertension)     Hx of psychiatric care     ICD (implantable cardioverter-defibrillator) infection 7/1/2020    Psychiatric problem     Thyroid disease     Ventricular tachycardia (paroxysmal)      Past Surgical History:   Procedure Laterality Date    AORTIC VALVULOPLASTY N/A 7/13/2022    Procedure: REPAIR, AORTIC VALVE;  Surgeon: Yg Kaufman MD;  [facility];  Service: Cardiovascular;  Laterality: N/A;    APPLICATION OF WOUND VACUUM-ASSISTED CLOSURE DEVICE N/A 7/15/2022    Procedure: APPLICATION, WOUND VAC;  Surgeon: Yg Kaufman MD;  [facility];  Service: Cardiovascular;  Laterality: N/A;  50 x 5 cm     CARDIAC CATHETERIZATION  Dec. 2012    CARDIAC DEFIBRILLATOR PLACEMENT Left     CRRT-D    COLONOSCOPY N/A 3/6/2018    Procedure: COLONOSCOPY;  Surgeon: Alonso Bone MD;  [facility]  Service: Endoscopy;  Laterality: N/A;    COLONOSCOPY N/A 7/17/2019    Procedure: COLONOSCOPY;  Surgeon: Blane Valdez MD;  [facility]  Service: Endoscopy;  Laterality: N/A;    COLONOSCOPY N/A 7/18/2019    Procedure: COLONOSCOPY;  Surgeon: Blane Valdez MD;  [facility]  Service: Endoscopy;  Laterality: N/A;    ESOPHAGOGASTRODUODENOSCOPY N/A 7/17/2019    Procedure: EGD (ESOPHAGOGASTRODUODENOSCOPY);  Surgeon: Blane Valdez MD;  [facility]  Service: Endoscopy;  Laterality: N/A;     ESOPHAGOGASTRODUODENOSCOPY N/A 7/18/2019    Procedure: EGD (ESOPHAGOGASTRODUODENOSCOPY);  Surgeon: Blane Valdez MD;  Location: [facility] ENDO (2ND FLR);  Service: Endoscopy;  Laterality: N/A;    FOREIGN BODY REMOVAL N/A 7/22/2022    Procedure: REMOVAL, FOREIGN BODY;  Surgeon: Yg Kaufman MD;  Location: [facility] OR [facility] FLR;  Service: Cardiovascular;  Laterality: N/A;  LVAD Heartmate 2 drive line removal    INSERTION OF GRAFT TO PERICARDIUM N/A 7/15/2022    Procedure: INSERTION, GRAFT, PERICARDIUM;  Surgeon: Yg Kaufman MD;  Location: [facility] OR [facility] FLR;  Service: Cardiovascular;  Laterality: N/A;    IRRIGATION OF MEDIASTINUM N/A 7/15/2022    Procedure: IRRIGATION, MEDIASTINUM;  Surgeon: Yg Kaufman MD;  Location: [facility] OR [facility] FLR;  Service: Cardiovascular;  Laterality: N/A;    LYSIS OF ADHESIONS  7/13/2022    Procedure: LYSIS, ADHESIONS;  Surgeon: Yg Kaufman MD;  [facility];  Service: Cardiovascular;;    NONINVASIVE CARDIAC ELECTROPHYSIOLOGY STUDY N/A 10/18/2019    Procedure: CARDIAC ELECTROPHYSIOLOGY STUDY, NONINVASIVE;  Surgeon: Raz Wagner MD;  Location: [facility] EP LAB;  Service: Cardiology;  Laterality: N/A;  VT, DFTs, MDT CRTD in situ, LVAD, juarez, MB, 3098    REPLACEMENT OF IMPLANTABLE CARDIOVERTER-DEFIBRILLATOR (ICD) GENERATOR N/A 3/9/2020    Procedure: REPLACEMENT, ICD GENERATOR;  Surgeon: Harry Yun MD;  Location: [facility] EP LAB;  Service: Cardiology;  Laterality: N/A;  VT, ICD Gen Change and Lead Revision, MDT, MAC, DM,3 Prep    REPLACEMENT OF LEFT VENTRICULAR ASSIST DEVICE (LVAD)  7/13/2022    Procedure: REPLACEMENT, LVAD;  Surgeon: Yg Kaufman MD;  [facility];  Service: Cardiovascular;;    REPLACEMENT OF PUMP N/A 7/13/2022    Procedure: REPLACEMENT, PUMP;  Surgeon: Yg Kaufman MD;  [facility];  Service: Cardiovascular;  Laterality: N/A;  LVAD pump exchange  EXPLANATION OF HEATMATE 2  IMPLANTATION OF HEARTMATE 3  IMPLANTATION OF 8MM CHIMNEY GRAFT TO  RFA  INITIATION OF ECMO  TEMPORARY CLOSURE OF CHEST    REVISION OF IMPLANTABLE CARDIOVERTER-DEFIBRILLATOR (ICD) ELECTRODE LEAD PLACEMENT N/A 3/9/2020    Procedure: REVISION, INSERTION, ELECTRODE LEAD, ICD;  Surgeon: Harry Yun MD;  Location: [facility] EP LAB;  Service: Cardiology;  Laterality: N/A;  VT, ICD Gen Change and Lead Revision, MDT, MAC, DM,3 Prep    STERNAL WOUND CLOSURE N/A 7/14/2022    Procedure: CLOSURE, WOUND, STERNUM;  Surgeon: Yg Kaufman MD;  Location: [facility] OR [facility] FLR;  Service: Cardiovascular;  Laterality: N/A;  temporary closure  evacuation of hematoma    STERNAL WOUND CLOSURE N/A 7/15/2022    Procedure: CLOSURE, WOUND, STERNUM;  Surgeon: Yg Kaufman MD;  [facility];  Service: Cardiovascular;  Laterality: N/A;    TRACHEOSTOMY N/A 8/4/2022    Procedure: CREATION, TRACHEOSTOMY;  Surgeon: Germain Holt MD;  [facility];  Service: General;  Laterality: N/A;    TREATMENT OF CARDIAC ARRHYTHMIA  10/18/2019    Procedure: Cardioversion or Defibrillation;  Surgeon: Raz Wagner MD;  Location: [facility] EP LAB;  Service: Cardiology;;     Review of patient's allergies indicates:   Allergen Reactions    Lisinopril Anaphylaxis    Hydralazine analogues      Chronic constipation, impotence, dizziness       Scheduled Medications:    amiodarone  400 mg Oral Daily    aspirin  81 mg Oral Daily    levalbuterol  0.63 mg Nebulization Q6H    levothyroxine  50 mcg Oral Before breakfast    magnesium oxide  400 mg Oral BID    mexiletine  250 mg Oral Q8H    mirtazapine  30 mg Oral QHS    ondansetron  4 mg Intravenous Once    pantoprazole  40 mg Oral Daily    polyethylene glycol  17 g Oral Daily    predniSONE  5 mg Oral Daily    senna-docusate 8.6-50 mg  1 tablet Oral Daily    torsemide  20 mg Oral Daily    warfarin  7.5 mg Oral Daily       PRN Medications: sodium chloride 0.9%, acetaminophen, ALPRAZolam, bisacodyL, dextrose 10 % in water (D10W), dextrose 10%, dextrose 10%, glucagon  (human recombinant), glucose, guaiFENesin 100 mg/5 ml, HYDROmorphone, HYDROmorphone, hydrOXYzine HCL, meclizine, melatonin, ondansetron    Family History       Problem Relation (Age of Onset)    Cancer Sister (54)    Coronary artery disease Father    Diabetes Father    Heart disease Father    Hypertension Father    No Known Problems Mother, Brother          Tobacco Use    Smoking status: Former     Packs/day: 1.00     Years: 31.00     Pack years: 31.00     Types: Cigarettes     Quit date: 2018     Years since quittin.6    Smokeless tobacco: Never    Tobacco comments:     pt is quiting on his own - pt stated not qualified for program;  pt  quit on his own   Substance and Sexual Activity    Alcohol use: No     Alcohol/week: 0.0 standard drinks     Comment: quit    Drug use: No    Sexual activity: Yes     Partners: Female     Birth control/protection: None     Comment: 10/5/17  with same partner 7 years      Review of Systems   Constitutional:  Positive for activity change. Negative for fatigue and fever.   HENT:  Negative for trouble swallowing and voice change.    Respiratory:  Negative for cough and shortness of breath.    Cardiovascular:  Negative for chest pain and leg swelling.   Gastrointestinal:  Negative for abdominal distention and abdominal pain.   Genitourinary:  Negative for difficulty urinating and flank pain.   Musculoskeletal:  Positive for gait problem. Negative for back pain.   Skin:  Negative for color change and rash.   Neurological:  Positive for weakness. Negative for speech difficulty and numbness.   Psychiatric/Behavioral:  Negative for agitation and confusion.    Objective:     Vital Signs (Most Recent):  Temp: 98.9 °F (37.2 °C) (22 1208)  Pulse: 80 (22 1200)  Resp: 18 (22 0759)  BP: (!) 74/0 (22 1208)  SpO2: 97 % (22 1208)      Vital Signs (24h Range):  Temp:  [97.7 °F (36.5 °C)-98.9 °F (37.2 °C)] 98.9 °F (37.2 °C)  Pulse:  [] 80  Resp:   [16-18] 18  SpO2:  [95 %-99 %] 97 %  BP: (66-74)/(0) 74/0     Body mass index is 26.87 kg/m².    Physical Exam  Vitals and nursing note reviewed.   Constitutional:       Appearance: Normal appearance. He is well-developed.   HENT:      Head: Normocephalic and atraumatic.   Eyes:      General:         Right eye: No discharge.         Left eye: No discharge.      Pupils: Pupils are equal, round, and reactive to light.   Neck:      Comments: trach  Pulmonary:      Effort: Pulmonary effort is normal. No respiratory distress.   Abdominal:      General: There is no distension.      Tenderness: There is no abdominal tenderness.   Musculoskeletal:         General: No deformity.      Comments: Deconditioned   Skin:     General: Skin is warm and dry.      Comments: Wound vac   Neurological:      Mental Status: He is alert. Mental status is at baseline.      Sensory: No sensory deficit.      Motor: Weakness present. No abnormal muscle tone.      Gait: Gait abnormal.   Psychiatric:         Mood and Affect: Mood normal.         Behavior: Behavior normal.     NEUROLOGICAL EXAMINATION:     CRANIAL NERVES     CN III, IV, VI   Pupils are equal, round, and reactive to light.    Diagnostic Results: Labs: Reviewed  ECG: Reviewed  CT: Reviewed

## 2022-08-30 NOTE — PLAN OF CARE
LVAD numbers WNL. Doppler pressures obtained, Dressing Changed with kit. WNL. Site intact, sutures intact. Dressing changed to Right side wound from previous LVAD. Heparin drip dc'd. Voids per urinal WNL. Denies pain. O2 maintained with trach collar. Call light in reach.

## 2022-08-30 NOTE — PLAN OF CARE
Pt cooperative with routine care and procedures. Pt VSS. Pt turned as needed and reminded to call for assistance if s/s of distress occur. Bed locked and in lowest position. Call bell and urinal within reach. Pt remained free from s/s of distress and pain. Pt on 8L O2 and 35% oxygen. Heparin gtt adjusted per nomogram to 13units/kg/hr.     Problem: Impaired Wound Healing  Goal: Optimal Wound Healing  Outcome: Ongoing, Progressing     Problem: Renal Function Impairment (Acute Kidney Injury/Impairment)  Goal: Effective Renal Function  Outcome: Ongoing, Progressing     Problem: Oral Intake Inadequate (Acute Kidney Injury/Impairment)  Goal: Optimal Nutrition Intake  Outcome: Ongoing, Progressing     Problem: Fluid and Electrolyte Imbalance (Acute Kidney Injury/Impairment)  Goal: Fluid and Electrolyte Balance  Outcome: Ongoing, Progressing    Problem: Coping Ineffective  Goal: Effective Coping  Outcome: Ongoing, Progressing     Problem: Right-Sided Heart Compromise (Ventricular Assist Device)  Goal: Effective Right-Sided Heart Function  Outcome: Ongoing, Progressing     Problem: Hemodynamic Instability (Ventricular Assist Device)  Goal: Optimal Blood Flow  Outcome: Ongoing, Progressing     Problem: Infection (Ventricular Assist Device)  Goal: Absence of Infection Signs and Symptoms  Outcome: Ongoing, Progressing     Problem: Bleeding (Ventricular Assist Device)  Goal: Absence of Bleeding  Outcome: Ongoing, Progressing     Problem: Adjustment to Device (Ventricular Assist Device)  Goal: Optimal Adjustment to Device  Outcome: Ongoing, Progressing     Problem: Skin Injury Risk Increased  Goal: Skin Health and Integrity  Outcome: Ongoing, Progressing     Problem: Fall Injury Risk  Goal: Absence of Fall and Fall-Related Injury  Outcome: Ongoing, Progressing     Problem: Neutropenia  Goal: Absence of Infection  Outcome: Ongoing, Progressing     Problem: Infection  Goal: Absence of Infection Signs and Symptoms  Outcome:  Ongoing, Progressing     Problem: Adult Inpatient Plan of Care  Goal: Plan of Care Review  Outcome: Ongoing, Progressing  Flowsheets (Taken 8/30/2022 0728)  Plan of Care Reviewed With: patient  Goal: Patient-Specific Goal (Individualized)  Outcome: Ongoing, Progressing  Flowsheets (Taken 8/30/2022 0728)  Anxieties, Fears or Concerns: Leaving the hospital soon enough  Individualized Care Needs: Monitor VS, lab values, s/s of distress  Patient-Specific Goals (Include Timeframe): Pt will remain free from s/s of distress before discharge  Goal: Absence of Hospital-Acquired Illness or Injury  Outcome: Ongoing, Progressing  Goal: Optimal Comfort and Wellbeing  Outcome: Ongoing, Progressing  Goal: Readiness for Transition of Care  Outcome: Ongoing, Progressing

## 2022-08-30 NOTE — HOSPITAL COURSE
8/26/22: Participated w/ PT. Bed mob SBA- José. Sit to stand José x 2 ppl. Not safe for ambulation.   8/29/22: SLP reg and thin. speaking valve in place for all meals

## 2022-08-30 NOTE — PROGRESS NOTES
[facility] - Cardiology Stepdown  Heart Transplant  Progress Note  Patient Name: Tim Richards  MRN: [id number]  Admission Date: 6/27/2022  Hospital Length of Stay: 64 days  Attending Physician: Roslyn Ragsdale DO  Primary Care Provider: Deyanira Booth MD  Principal Problem:LVAD (left ventricular assist device) present    Subjective:     Interval History: No acute events overnight. Motivated. Ready to get to rehab.     Continuous Infusions:   dextrose 10 % in water (D10W)      heparin (porcine) in D5W 13 Units/kg/hr (08/30/22 0708)     Scheduled Meds:   amiodarone  400 mg Oral Daily    aspirin  81 mg Oral Daily    levalbuterol  0.63 mg Nebulization Q6H    levothyroxine  50 mcg Oral Before breakfast    magnesium oxide  400 mg Oral BID    mexiletine  250 mg Oral Q8H    mirtazapine  30 mg Oral QHS    ondansetron  4 mg Intravenous Once    pantoprazole  40 mg Oral Daily    polyethylene glycol  17 g Oral Daily    predniSONE  5 mg Oral Daily    senna-docusate 8.6-50 mg  1 tablet Oral Daily    torsemide  20 mg Oral Daily    warfarin  7.5 mg Oral Daily     PRN Meds:sodium chloride 0.9%, acetaminophen, ALPRAZolam, bisacodyL, dextrose 10 % in water (D10W), dextrose 10%, dextrose 10%, glucagon (human recombinant), glucose, guaiFENesin 100 mg/5 ml, HYDROmorphone, HYDROmorphone, hydrOXYzine HCL, meclizine, melatonin, ondansetron    Review of patient's allergies indicates:   Allergen Reactions    Lisinopril Anaphylaxis    Hydralazine analogues      Chronic constipation, impotence, dizziness     Objective:     Vital Signs (Most Recent):  Temp: 98.3 °F (36.8 °C) (08/30/22 0716)  Pulse: 82 (08/30/22 0759)  Resp: 18 (08/30/22 0759)  BP: (!) 70/0 (08/30/22 0443)  SpO2: 97 % (08/30/22 0759)   Vital Signs (24h Range):  Temp:  [97.7 °F (36.5 °C)-98.6 °F (37 °C)] 98.3 °F (36.8 °C)  Pulse:  [] 82  Resp:  [16-18] 18  SpO2:  [90 %-99 %] 97 %  BP: (66-70)/(0) 70/0     Patient Vitals for the past 72 hrs (Last 3  readings):   Weight   08/30/22 0400 92.4 kg (203 lb 11.2 oz)   08/29/22 0433 93.7 kg (206 lb 9.1 oz)   08/28/22 0336 93.7 kg (206 lb 9.1 oz)       Body mass index is 26.87 kg/m².      Intake/Output Summary (Last 24 hours) at 8/30/2022 0915  Last data filed at 8/30/2022 0708  Gross per 24 hour   Intake 577 ml   Output 1100 ml   Net -523 ml            Telemetry: No significant arrhythmic events overnight.    Physical Exam  Vitals and nursing note reviewed.   Constitutional:       General: He is not in acute distress.     Appearance: Normal appearance. He is well-developed. He is not ill-appearing.   HENT:      Head: Normocephalic and atraumatic.      Ears:      Abel exam findings: Lateralizes left.     Right Rinne: AC > BC.     Left Rinne: AC > BC.     Nose: Nose normal.      Mouth/Throat:      Mouth: Mucous membranes are moist.      Comments: Tracheostomy site is clean and dry without drainage  Eyes:      Extraocular Movements: Extraocular movements intact.      Conjunctiva/sclera: Conjunctivae normal.      Pupils: Pupils are equal, round, and reactive to light.   Cardiovascular:      Rate and Rhythm: Normal rate and regular rhythm.      Pulses: Normal pulses.      Heart sounds: Normal heart sounds.      Comments: VAD hum can be heard, no significant JVD noted on exam  Pulmonary:      Effort: Pulmonary effort is normal.      Breath sounds: Normal breath sounds. No stridor.   Abdominal:      General: Abdomen is flat. Bowel sounds are normal. There is no distension.      Palpations: Abdomen is soft.      Tenderness: There is no abdominal tenderness. There is no guarding.   Musculoskeletal:         General: Normal range of motion.      Cervical back: Normal range of motion and neck supple. No rigidity.   Lymphadenopathy:      Cervical: No cervical adenopathy.   Skin:     General: Skin is warm and dry.      Capillary Refill: Capillary refill takes less than 2 seconds.   Neurological:      General: No focal deficit  present.      Mental Status: He is alert and oriented to person, place, and time.   Psychiatric:         Mood and Affect: Mood normal.         Behavior: Behavior normal.         Thought Content: Thought content normal.         Judgment: Judgment normal.       Significant Labs:  CBC:  Recent Labs   Lab 08/28/22  0506 08/28/22  0719 08/29/22  0428 08/30/22  0454   WBC 6.79  --  5.28 5.14   RBC 2.79*  --  2.58* 2.59*   HGB 7.8* 7.1* 7.1* 7.1*   HCT 26.7*  --  24.5* 23.8*     --  176 167   MCV 96  --  95 92   MCH 28.0  --  27.5 27.4   MCHC 29.2*  --  29.0* 29.8*       BNP:  Recent Labs   Lab 08/24/22  0448 08/26/22  0425 08/29/22 0428   * 210* 171*       CMP:  Recent Labs   Lab 08/24/22  0856 08/25/22  0338 08/26/22  0425 08/28/22  0506 08/29/22  0428 08/30/22  0454   GLU 68* 93   < > 91 72 104   CALCIUM 8.7 8.9   < > 8.9 9.1 9.1   ALBUMIN 2.2* 2.1*  --   --   --   --    PROT 5.9* 5.6*  --   --   --   --    * 134*   < > 134* 133* 134*   K 4.5 4.7   < > 4.8 4.7 4.6   CO2 28 28   < > 29 29 30*   CL 99 100   < > 100 98 99   BUN 17 20   < > 15 15 16   CREATININE 1.1 1.2   < > 1.0 1.0 0.9   ALKPHOS 166* 159*  --   --   --   --    ALT 56* 53*  --   --   --   --    AST 32 29  --   --   --   --    BILITOT 1.3* 1.2*  --   --   --   --     < > = values in this interval not displayed.        Coagulation:   Recent Labs   Lab 08/28/22  0506 08/29/22 0428 08/30/22  0454   INR 1.5* 1.8* 1.9*   APTT 40.7* 45.3* 60.6*       LDH:  Recent Labs   Lab 08/28/22  0506 08/29/22  0428 08/30/22  0454    229 285*       Microbiology:  Microbiology Results (last 7 days)       ** No results found for the last 168 hours. **            I have reviewed all pertinent labs within the past 24 hours.    Estimated Creatinine Clearance: 104.8 mL/min (based on SCr of 0.9 mg/dL).    Diagnostic Results:  I have reviewed all pertinent imaging results/findings within the past 24 hours.    Assessment and Plan:     55 Y/O M with Hx of  stage D HFrEF (EF 10%) due to NICM underwent HM2 implant 3/8/18 and exchange of outflow graft 3/9/18, Hx VT/VF s/p SICD 2014 presenting with gradual worsening shortness of breath associated with nonpleuritic, nonradiating bilateral 4/10 retrosternal chest pressure pain.  Symptoms began yesterday and have gradually worsened in the last 12 hours.  He does report going to a family picnic with increased consumption of chicken wings, ribs and other salty meat products.  Prior to symptom onset, he reports nausea, nonbloody diarrhea began yesterday and single episode of nonbloody nonbilious vomiting this morning. He also complains of dizziness, lightheadedness, and a mild HA. SOB worsened this morning prompting him to come to the ED.     In the ED, patient was in respiratory distress requiring BiPAP. MAP 96 and started on Nitro gtt. Work-up revealed WBC 10, K 5.4, Cr 2.5 (baseline 1.9), BNP 1950 (baseline 200-300s), Bili 2.1, LDH 1058, and INR 3.2. Bedside TTE with severely reduced EF, AV not opening, septum bulging into RV. Given IV Lasix 80 mg x1 with only 100-200 mL UOP and dark in color. HM2 interrogated and flows have been 8.5-9 L/min with no alarms or power surges. Cardiology consulted in the ED, dicussed with HTS, and decision made to admit to ICU for further mgmt.       * LVAD (left ventricular assist device) present  -The patient presented with COVID and found to have an uptrending LDH with increased power. He underwent HM III upgrade on 7/13/22.  -ASA 81mg.  -INR goal 2-3. Subtherapeutic. Con hep gtt.   -BP controlled.   -Restart Po diuretic.   -Echo 8/23: EF 15%, LViDD 7.55, TAPSE 1.26, AV does not open. The ventricular septum is at midline. Will repeat.   -PT/OT/Speech. Will likely need rehab.     Procedure: Device Interrogation Including analysis of device parameters  Current Settings: Ventricular Assist Device    TXP LVAD INTERROGATIONS 8/30/2022 8/30/2022 8/30/2022 8/29/2022 8/29/2022 8/29/2022 8/29/2022    Type HeartMate3 HeartMate3 HeartMate3 HeartMate3 HeartMate3 HeartMate3 HeartMate3   Flow 5.3 5.1 5.3 5.4 5.4 5.3 5.4   Speed 6300 6300 6300 6300 6300 6300 6300   PI 3.5 4.0 2.6 3.1 2.7 3.1 3.4   Power (Jackson) 5.3 5.3 5.3 5.2 5.3 5.3 5.3   LSL 5900 - - 5900 - - -   Low Flow Alarm no no no No - - -   High Power Alarm no - - No - - -   Pulsatility Intermittent pulse Intermittent pulse Intermittent pulse Intermittent pulse - - -       History of ventricular tachycardia  Patient experienced an episode of VT on 6/30 and experienced an ICD shock thought to be related to hypokalemia (K of 3.1 on 6/30)  - Significant VT hx previously controlled with amio 400mg q d.   EP consulted for mexiletine dosing as pt was having significant nausea and dizziness.   -Meds changed to mexiletine 250mg TID  and amio increased to 400mg.    Acute on chronic combined systolic and diastolic congestive heart failure  -not on GDMT due to hypotension    Amiodarone-induced hyperthyroidism   -Appreciate  Endo. Continue prednisone 5 mg daily through 8/31 to prevent iatrogenic AI and then discontinue  - Continue Levothyroxine 50 mcg daily  - Repeat TSH/Free T4 level on 9/2, Endocrine will monitor peripherally until then    VT (ventricular tachycardia)  Significant VT hx previously controlled with amio 400mg q d.   EP consulted for mexiletine dosing as pt was having significant nausea and dizziness.   -Meds changed to mexiletine 250mg TID  and amio increased to 400mg.    Hypoglycemia  - due to poor PO intake    Chronic combined systolic and diastolic heart failure  -Previously on HM2 but complicated by thrombosis related to COVID  -Underwent HM III upgrade on 7/13/22  -Volume status: Euvolemic, will monitor. No diuretics for now  -Chest tube removal, bronch, and old driveline removed 7/22  -Currently being bridged with heparin, continue coumadin to goal INR of 2-3  - Daily INRs      Loki Randall, NP  Heart Transplant  [facility] - Cardiology  Stepdown

## 2022-08-30 NOTE — ASSESSMENT & PLAN NOTE
-The patient presented with COVID and found to have an uptrending LDH with increased power. He underwent HM III upgrade on 7/13/22.  -ASA 81mg.  -INR goal 2-3. Subtherapeutic. Con hep gtt.   -BP controlled.   -Restart Po diuretic.   -Echo 8/23: EF 15%, LViDD 7.55, TAPSE 1.26, AV does not open. The ventricular septum is at midline. Will repeat.   -PT/OT/Speech. Will likely need rehab.     Procedure: Device Interrogation Including analysis of device parameters  Current Settings: Ventricular Assist Device    TXP LVAD INTERROGATIONS 8/30/2022 8/30/2022 8/30/2022 8/29/2022 8/29/2022 8/29/2022 8/29/2022   Type HeartMate3 HeartMate3 HeartMate3 HeartMate3 HeartMate3 HeartMate3 HeartMate3   Flow 5.3 5.1 5.3 5.4 5.4 5.3 5.4   Speed 6300 6300 6300 6300 6300 6300 6300   PI 3.5 4.0 2.6 3.1 2.7 3.1 3.4   Power (Jackson) 5.3 5.3 5.3 5.2 5.3 5.3 5.3   LSL 5900 - - 5900 - - -   Low Flow Alarm no no no No - - -   High Power Alarm no - - No - - -   Pulsatility Intermittent pulse Intermittent pulse Intermittent pulse Intermittent pulse - - -

## 2022-08-30 NOTE — NURSING
"Pt resting in bed, no family at bedside. Pt progressing "little strides each day" per himself. Pt is asking when he will get to rehab because he is ready to get stronger. Pt also asking about his trach plan. I let patient know that rehab has been consulted and is following him to give OK when ready for rehab. Regarding trach, I informed patient that I will have to get back to him on this because I do not have a definite answer right now. Pt verbalized okay with this.     For HM3 education, patient and wife been checked off on VAD and alarms. Pt and wife already know how to perform dressing change.   "

## 2022-08-30 NOTE — HPI
Tim Richards is a 56-year-old male with PMHx of stage D HF with an EF of 10%, LVAD placed in 2018, V tach. Patient presented to [facility] on 6/27/22 for increasing concern for LVAD dysfunction and resp distress in ED. Went to OR for HM3 upgrade on 7/13/22. 7/20 BAL+ Klebsiella aerogenes pansensitive by bronch. 7/26 resp cx + Klebsiella aerogenes pansensitive, Blood cx NGTD, urine cx NGTD. 7/27 Extubated. 7/29 Re-intubated for hypercapneic respiratory failure, rule out COVID and positive (ID was consulted, recommended Remdesivir and course completed), 8/4 s/p 8 shiley trach placement. Now tolerating reg diet. Hospital course complicated by V tach (EP consulted and on Mexiletine PO and Amio).      Wound care consult received 8/19 R Groin. Patient with large amounts of serous to yellow clear drainage noted to the dressing and his gown. Wound vac applied per MD order with no difficulties. Area of graft protected with 4 layers of adaptic and white foam. Wound vac set to -75 mmHg and seal achieved. Patient tolerated with no difficulties. Plan for next dressing change on Tuesday. Nursing to continue care.     Functional History: Patient lives in [place] with wife in a house. Prior to admission, irene w/ LVAD.

## 2022-08-30 NOTE — PT/OT/SLP PROGRESS
Occupational Therapy   Treatment    Name: Tim Richards  MRN: [id number]  Admitting Diagnosis:  LVAD (left ventricular assist device) present  26 Days Post-Op    Recommendations:     Discharge Recommendations: rehabilitation facility  Discharge Equipment Recommendations:   (TBD closer to d/c)  Barriers to discharge:  Decreased caregiver support, Inaccessible home environment    Assessment:     Tim Richards is a 56 y.o. male with a medical diagnosis of LVAD (left ventricular assist device) present.  He presents with increased frustration during session. Pt can be self-limiting, but was able to stand for slightly longer period and take 5 steps fwd/back. Performance deficits affecting function are impaired balance, weakness, impaired endurance, impaired self care skills, impaired functional mobility, gait instability, decreased lower extremity function, decreased safety awareness.     Rehab Prognosis:  Good; patient would benefit from acute skilled OT services to address these deficits and reach maximum level of function.       Plan:     Patient to be seen 5 x/week to address the above listed problems via self-care/home management, therapeutic activities, therapeutic exercises, neuromuscular re-education  Plan of Care Expires: 08/25/22  Plan of Care Reviewed with: patient    Subjective     Pain/Comfort:  Pain Rating 1: 0/10  Pain Rating Post-Intervention 1: 0/10    Objective:     Communicated with: RN prior to session.  Patient found supine with telemetry, LVAD, Tracheostomy, oxygen, PICC line upon OT entry to room.    General Precautions: Standard, fall, LVAD   Orthopedic Precautions:N/A   Braces:    Respiratory Status:  Trach collar     Occupational Performance:     Bed Mobility:    Patient completed Supine to Sit with stand by assistance  Patient completed Sit to Supine with stand by assistance     Functional Mobility/Transfers:  Patient completed Sit <> Stand Transfer with minimum assistance and of 2  persons  with  rolling walker   Functional Mobility: Min A x 2 5 steps fwd/back x 2 trials and 3 sidesteps along EOB    Activities of Daily Living:  Grooming: modified independence for tasks; Mod A for upright posture in static standing during task  Lower Body Dressing: maximal assistance for simulated stand phase      [facility] 6 Click ADL: 19    Treatment & Education:  Pt ed on OT POC  Pt ed on importance of activity progression and resisting self-limiting behavior    Patient left HOB elevated with all lines intact, call button in reach, and RN notified    GOALS:   Multidisciplinary Problems       Occupational Therapy Goals          Problem: Occupational Therapy    Goal Priority Disciplines Outcome Interventions   Occupational Therapy Goal     OT, PT/OT Ongoing, Progressing    Description: Goals to be met by: 8/9/22     Patient will increase functional independence with ADLs by performing:    UE Dressing with Stand-by Assistance.  LE Dressing with Stand-by Assistance.  Grooming while standing at sink with Stand-by Assistance.  Toileting from toilet with Stand-by Assistance for hygiene and clothing management.   Toilet transfer to toilet with Stand-by Assistance.                   Multidisciplinary Problems (Resolved)          Problem: Occupational Therapy    Goal Priority Disciplines Outcome Interventions   Occupational Therapy Goal   (Resolved)     OT, PT/OT Met           Problem: Occupational Therapy    Goal Priority Disciplines Outcome Interventions   Occupational Therapy Goal   (Resolved)     OT, PT/OT Met                        Time Tracking:     OT Date of Treatment: 08/30/22  OT Start Time: 0942  OT Stop Time: 1005  OT Total Time (min): 23 min    Billable Minutes:Self Care/Home Management 10  Therapeutic Activity 13               8/30/2022

## 2022-08-30 NOTE — PROGRESS NOTES
Interdisciplinary Rounds Report:   Attended interdisciplinary rounds with the [facility]/CTS services including the LVAD Coordinators, social workers, cardiologists, surgeons,  PT/OT/Speech, dietician, and unit charge nurses. Discussed patient status, plan of care, goals of care, including DC date, and post discharge needs. Plan of care will be discussed with the patient and/or family per the physician while rounding on the floor. This is a recurring meeting that is medically and socially necessary to collaborate with the interdisciplinary team to assist patient needs and safe discharge.

## 2022-08-30 NOTE — CONSULTS
Inpatient consult to Physical Medicine Rehab  Consult performed by: Jessica Sal NP  Consult ordered by: Roslyn Ragsdale DO  Reason for consult: Assess rehab needs    Reviewed patient history and current admission.  Rehab team following.  Full consult to follow.    JEM King, FNP-C  Physical Medicine & Rehabilitation   08/30/2022

## 2022-08-30 NOTE — SUBJECTIVE & OBJECTIVE
Interval History: No acute events overnight. Motivated. Ready to get to rehab.     Continuous Infusions:   dextrose 10 % in water (D10W)      heparin (porcine) in D5W 13 Units/kg/hr (08/30/22 0708)     Scheduled Meds:   amiodarone  400 mg Oral Daily    aspirin  81 mg Oral Daily    levalbuterol  0.63 mg Nebulization Q6H    levothyroxine  50 mcg Oral Before breakfast    magnesium oxide  400 mg Oral BID    mexiletine  250 mg Oral Q8H    mirtazapine  30 mg Oral QHS    ondansetron  4 mg Intravenous Once    pantoprazole  40 mg Oral Daily    polyethylene glycol  17 g Oral Daily    predniSONE  5 mg Oral Daily    senna-docusate 8.6-50 mg  1 tablet Oral Daily    torsemide  20 mg Oral Daily    warfarin  7.5 mg Oral Daily     PRN Meds:sodium chloride 0.9%, acetaminophen, ALPRAZolam, bisacodyL, dextrose 10 % in water (D10W), dextrose 10%, dextrose 10%, glucagon (human recombinant), glucose, guaiFENesin 100 mg/5 ml, HYDROmorphone, HYDROmorphone, hydrOXYzine HCL, meclizine, melatonin, ondansetron    Review of patient's allergies indicates:   Allergen Reactions    Lisinopril Anaphylaxis    Hydralazine analogues      Chronic constipation, impotence, dizziness     Objective:     Vital Signs (Most Recent):  Temp: 98.3 °F (36.8 °C) (08/30/22 0716)  Pulse: 82 (08/30/22 0759)  Resp: 18 (08/30/22 0759)  BP: (!) 70/0 (08/30/22 0443)  SpO2: 97 % (08/30/22 0759)   Vital Signs (24h Range):  Temp:  [97.7 °F (36.5 °C)-98.6 °F (37 °C)] 98.3 °F (36.8 °C)  Pulse:  [] 82  Resp:  [16-18] 18  SpO2:  [90 %-99 %] 97 %  BP: (66-70)/(0) 70/0     Patient Vitals for the past 72 hrs (Last 3 readings):   Weight   08/30/22 0400 92.4 kg (203 lb 11.2 oz)   08/29/22 0433 93.7 kg (206 lb 9.1 oz)   08/28/22 0336 93.7 kg (206 lb 9.1 oz)       Body mass index is 26.87 kg/m².      Intake/Output Summary (Last 24 hours) at 8/30/2022 0915  Last data filed at 8/30/2022 0708  Gross per 24 hour   Intake 577 ml   Output 1100 ml   Net -523 ml            Telemetry: No  significant arrhythmic events overnight.    Physical Exam  Vitals and nursing note reviewed.   Constitutional:       General: He is not in acute distress.     Appearance: Normal appearance. He is well-developed. He is not ill-appearing.   HENT:      Head: Normocephalic and atraumatic.      Ears:      Abel exam findings: Lateralizes left.     Right Rinne: AC > BC.     Left Rinne: AC > BC.     Nose: Nose normal.      Mouth/Throat:      Mouth: Mucous membranes are moist.      Comments: Tracheostomy site is clean and dry without drainage  Eyes:      Extraocular Movements: Extraocular movements intact.      Conjunctiva/sclera: Conjunctivae normal.      Pupils: Pupils are equal, round, and reactive to light.   Cardiovascular:      Rate and Rhythm: Normal rate and regular rhythm.      Pulses: Normal pulses.      Heart sounds: Normal heart sounds.      Comments: VAD hum can be heard, no significant JVD noted on exam  Pulmonary:      Effort: Pulmonary effort is normal.      Breath sounds: Normal breath sounds. No stridor.   Abdominal:      General: Abdomen is flat. Bowel sounds are normal. There is no distension.      Palpations: Abdomen is soft.      Tenderness: There is no abdominal tenderness. There is no guarding.   Musculoskeletal:         General: Normal range of motion.      Cervical back: Normal range of motion and neck supple. No rigidity.   Lymphadenopathy:      Cervical: No cervical adenopathy.   Skin:     General: Skin is warm and dry.      Capillary Refill: Capillary refill takes less than 2 seconds.   Neurological:      General: No focal deficit present.      Mental Status: He is alert and oriented to person, place, and time.   Psychiatric:         Mood and Affect: Mood normal.         Behavior: Behavior normal.         Thought Content: Thought content normal.         Judgment: Judgment normal.       Significant Labs:  CBC:  Recent Labs   Lab 08/28/22  0506 08/28/22  0719 08/29/22  0428 08/30/22  0454   WBC  6.79  --  5.28 5.14   RBC 2.79*  --  2.58* 2.59*   HGB 7.8* 7.1* 7.1* 7.1*   HCT 26.7*  --  24.5* 23.8*     --  176 167   MCV 96  --  95 92   MCH 28.0  --  27.5 27.4   MCHC 29.2*  --  29.0* 29.8*       BNP:  Recent Labs   Lab 08/24/22  0448 08/26/22  0425 08/29/22 0428   * 210* 171*       CMP:  Recent Labs   Lab 08/24/22  0856 08/25/22  0338 08/26/22  0425 08/28/22  0506 08/29/22 0428 08/30/22  0454   GLU 68* 93   < > 91 72 104   CALCIUM 8.7 8.9   < > 8.9 9.1 9.1   ALBUMIN 2.2* 2.1*  --   --   --   --    PROT 5.9* 5.6*  --   --   --   --    * 134*   < > 134* 133* 134*   K 4.5 4.7   < > 4.8 4.7 4.6   CO2 28 28   < > 29 29 30*   CL 99 100   < > 100 98 99   BUN 17 20   < > 15 15 16   CREATININE 1.1 1.2   < > 1.0 1.0 0.9   ALKPHOS 166* 159*  --   --   --   --    ALT 56* 53*  --   --   --   --    AST 32 29  --   --   --   --    BILITOT 1.3* 1.2*  --   --   --   --     < > = values in this interval not displayed.        Coagulation:   Recent Labs   Lab 08/28/22  0506 08/29/22 0428 08/30/22 0454   INR 1.5* 1.8* 1.9*   APTT 40.7* 45.3* 60.6*       LDH:  Recent Labs   Lab 08/28/22  0506 08/29/22 0428 08/30/22  0454    229 285*       Microbiology:  Microbiology Results (last 7 days)       ** No results found for the last 168 hours. **            I have reviewed all pertinent labs within the past 24 hours.    Estimated Creatinine Clearance: 104.8 mL/min (based on SCr of 0.9 mg/dL).    Diagnostic Results:  I have reviewed all pertinent imaging results/findings within the past 24 hours.

## 2022-08-30 NOTE — CONSULTS
[facility] - Cardiology Stepdown  Physical Medicine & Rehab  Consult Note    Patient Name: Tim Richards  MRN: [id number]  Admission Date: 6/27/2022  Hospital Length of Stay: 64 days  Attending Physician: Roslyn Ragsdale DO    Inpatient consult to Physical Medicine & Rehabilitation  Consult performed by: Jessica Sal NP  Consult requested by:  Roslyn Ragsdale DO    Collaborating Physician: Luh Reno MD  Reason for Consult:  Assess rehabilitation needs     Consults  Subjective:     Principal Problem: LVAD (left ventricular assist device) present    HPI: Tim Richards is a 56-year-old male with PMHx of stage D HF with an EF of 10%, LVAD placed in 2018, V tach. Patient presented to [facility] on 6/27/22 for increasing concern for LVAD dysfunction and resp distress in ED. Went to OR for HM3 upgrade on 7/13/22. 7/20 BAL+ Klebsiella aerogenes pansensitive by bronch. 7/26 resp cx + Klebsiella aerogenes pansensitive, Blood cx NGTD, urine cx NGTD. 7/27 Extubated. 7/29 Re-intubated for hypercapneic respiratory failure, rule out COVID and positive (ID was consulted, recommended Remdesivir and course completed), 8/4 s/p 8 shiley tach placement. Now tolerating reg diet. Hospital course complicated by V tach (EP consulted and on Mexiletine PO and Amio).    Functional History: Patient lives in [place] with wife in a house. Prior to admission, irene w/ LVAD.      Hospital Course: 8/26/22: Participated w/ PT. Bed mob SBA- José. Sit to stand José x 2 ppl. Not safe for ambulation.   8/29/22: SLP reg and thin. speaking valve in place for all meals       Past Medical History:   Diagnosis Date    A-fib     Anticoagulant long-term use     Atrial flutter 7/30/2022    CHF (congestive heart failure)     Class 1 obesity due to excess calories with serious comorbidity and body mass index (BMI) of 31.0 to 31.9 in adult     Class 1 obesity due to excess calories with serious comorbidity and body mass index (BMI) of 32.0 to  32.9 in adult     Dilated cardiomyopathy 1/10/2018    Disorder of kidney and ureter     CKD    Encounter for blood transfusion     Essential hypertension 8/28/2022    Gout     HTN (hypertension)     Hx of psychiatric care     ICD (implantable cardioverter-defibrillator) infection 7/1/2020    Psychiatric problem     Thyroid disease     Ventricular tachycardia (paroxysmal)      Past Surgical History:   Procedure Laterality Date    AORTIC VALVULOPLASTY N/A 7/13/2022    Procedure: REPAIR, AORTIC VALVE;  Surgeon: Yg Kaufman MD;  Location: [facility] OR 2ND FLR;  Service: Cardiovascular;  Laterality: N/A;    APPLICATION OF WOUND VACUUM-ASSISTED CLOSURE DEVICE N/A 7/15/2022    Procedure: APPLICATION, WOUND VAC;  Surgeon: Yg Kaufman MD;  Location: [facility] OR 2ND FLR;  Service: Cardiovascular;  Laterality: N/A;  50 x 5 cm     CARDIAC CATHETERIZATION  Dec. 2012    CARDIAC DEFIBRILLATOR PLACEMENT Left     CRRT-D    COLONOSCOPY N/A 3/6/2018    Procedure: COLONOSCOPY;  Surgeon: Alonso Bone MD;  Location: [facility] (2ND FLR);  Service: Endoscopy;  Laterality: N/A;    COLONOSCOPY N/A 7/17/2019    Procedure: COLONOSCOPY;  Surgeon: Blane Valdez MD;  Location: [facility] ENDO (2ND FLR);  Service: Endoscopy;  Laterality: N/A;    COLONOSCOPY N/A 7/18/2019    Procedure: COLONOSCOPY;  Surgeon: Blane Valdez MD;  Location: [facility] ENDO (2ND FLR);  Service: Endoscopy;  Laterality: N/A;    ESOPHAGOGASTRODUODENOSCOPY N/A 7/17/2019    Procedure: EGD (ESOPHAGOGASTRODUODENOSCOPY);  Surgeon: Blane Valdez MD;  Location: [facility] ENDO (2ND FLR);  Service: Endoscopy;  Laterality: N/A;    ESOPHAGOGASTRODUODENOSCOPY N/A 7/18/2019    Procedure: EGD (ESOPHAGOGASTRODUODENOSCOPY);  Surgeon: Blane Valdez MD;  Location: [facility] ENDO (2ND FLR);  Service: Endoscopy;  Laterality: N/A;    FOREIGN BODY REMOVAL N/A 7/22/2022    Procedure: REMOVAL, FOREIGN BODY;  Surgeon: Yg Kaufman MD;  Location: [facility] OR 2ND FLR;  Service: Cardiovascular;  Laterality: N/A;   LVAD Heartmate 2 drive line removal    INSERTION OF GRAFT TO PERICARDIUM N/A 7/15/2022    Procedure: INSERTION, GRAFT, PERICARDIUM;  Surgeon: Yg Kaufman MD;  [facility];  Service: Cardiovascular;  Laterality: N/A;    IRRIGATION OF MEDIASTINUM N/A 7/15/2022    Procedure: IRRIGATION, MEDIASTINUM;  Surgeon: Yg Kaufman MD;  [facility];  Service: Cardiovascular;  Laterality: N/A;    LYSIS OF ADHESIONS  7/13/2022    Procedure: LYSIS, ADHESIONS;  Surgeon: Yg Kaufman MD;  Location: [facility] OR [address] [place] [address];  Service: Cardiovascular;;    NONINVASIVE CARDIAC ELECTROPHYSIOLOGY STUDY N/A 10/18/2019    Procedure: CARDIAC ELECTROPHYSIOLOGY STUDY, NONINVASIVE;  Surgeon: Raz Wagner MD;  Location: [facility] EP LAB;  Service: Cardiology;  Laterality: N/A;  VT, DFTs, MDT CRTD in situ, LVAD, anes, MB, 3098    REPLACEMENT OF IMPLANTABLE CARDIOVERTER-DEFIBRILLATOR (ICD) GENERATOR N/A 3/9/2020    Procedure: REPLACEMENT, ICD GENERATOR;  Surgeon: Harry Yun MD;  Location: [facility] EP LAB;  Service: Cardiology;  Laterality: N/A;  VT, ICD Gen Change and Lead Revision, MDT, MAC, DM,3 Prep    REPLACEMENT OF LEFT VENTRICULAR ASSIST DEVICE (LVAD)  7/13/2022    Procedure: REPLACEMENT, LVAD;  Surgeon: Yg Kaufman MD;  Location: [facility] OR [address] [place] [address];  Service: Cardiovascular;;    REPLACEMENT OF PUMP N/A 7/13/2022    Procedure: REPLACEMENT, PUMP;  Surgeon: Yg Kaufman MD;  Location: [facility] OR [address] [place] [address];  Service: Cardiovascular;  Laterality: N/A;  LVAD pump exchange  EXPLANATION OF HEATMATE 2  IMPLANTATION OF HEARTMATE 3  IMPLANTATION OF 8MM CHIMNEY GRAFT TO RFA  INITIATION OF ECMO  TEMPORARY CLOSURE OF CHEST    REVISION OF IMPLANTABLE CARDIOVERTER-DEFIBRILLATOR (ICD) ELECTRODE LEAD PLACEMENT N/A 3/9/2020    Procedure: REVISION, INSERTION, ELECTRODE LEAD, ICD;  Surgeon: Harry Yun MD;  Location: [facility] EP LAB;  Service: Cardiology;  Laterality: N/A;  VT, ICD Gen Change and Lead Revision, MDT, MAC, DM,3  Prep    STERNAL WOUND CLOSURE N/A 7/14/2022    Procedure: CLOSURE, WOUND, STERNUM;  Surgeon: Yg Kaufman MD;  [facility];  Service: Cardiovascular;  Laterality: N/A;  temporary closure  evacuation of hematoma    STERNAL WOUND CLOSURE N/A 7/15/2022    Procedure: CLOSURE, WOUND, STERNUM;  Surgeon: Yg Kaufman MD;  Location: [facility] OR [facility] FLR;  Service: Cardiovascular;  Laterality: N/A;    TRACHEOSTOMY N/A 8/4/2022    Procedure: CREATION, TRACHEOSTOMY;  Surgeon: Germain Holt MD;  [facility];  Service: General;  Laterality: N/A;    TREATMENT OF CARDIAC ARRHYTHMIA  10/18/2019    Procedure: Cardioversion or Defibrillation;  Surgeon: Raz Wagner MD;  Location: [facility] EP LAB;  Service: Cardiology;;     Review of patient's allergies indicates:   Allergen Reactions    Lisinopril Anaphylaxis    Hydralazine analogues      Chronic constipation, impotence, dizziness       Scheduled Medications:    amiodarone  400 mg Oral Daily    aspirin  81 mg Oral Daily    levalbuterol  0.63 mg Nebulization Q6H    levothyroxine  50 mcg Oral Before breakfast    magnesium oxide  400 mg Oral BID    mexiletine  250 mg Oral Q8H    mirtazapine  30 mg Oral QHS    ondansetron  4 mg Intravenous Once    pantoprazole  40 mg Oral Daily    polyethylene glycol  17 g Oral Daily    predniSONE  5 mg Oral Daily    senna-docusate 8.6-50 mg  1 tablet Oral Daily    torsemide  20 mg Oral Daily    warfarin  7.5 mg Oral Daily       PRN Medications: sodium chloride 0.9%, acetaminophen, ALPRAZolam, bisacodyL, dextrose 10 % in water (D10W), dextrose 10%, dextrose 10%, glucagon (human recombinant), glucose, guaiFENesin 100 mg/5 ml, HYDROmorphone, HYDROmorphone, hydrOXYzine HCL, meclizine, melatonin, ondansetron    Family History       Problem Relation (Age of Onset)    Cancer Sister (54)    Coronary artery disease Father    Diabetes Father    Heart disease Father    Hypertension Father    No Known Problems  Mother, Brother          Tobacco Use    Smoking status: Former     Packs/day: 1.00     Years: 31.00     Pack years: 31.00     Types: Cigarettes     Quit date: 2018     Years since quittin.6    Smokeless tobacco: Never    Tobacco comments:     pt is quiting on his own - pt stated not qualified for program;  pt  quit on his own   Substance and Sexual Activity    Alcohol use: No     Alcohol/week: 0.0 standard drinks     Comment: quit    Drug use: No    Sexual activity: Yes     Partners: Female     Birth control/protection: None     Comment: 10/5/17  with same partner 7 years      Review of Systems   Constitutional:  Positive for activity change. Negative for fatigue and fever.   HENT:  Negative for trouble swallowing and voice change.    Respiratory:  Negative for cough and shortness of breath.    Cardiovascular:  Negative for chest pain and leg swelling.   Gastrointestinal:  Negative for abdominal distention and abdominal pain.   Genitourinary:  Negative for difficulty urinating and flank pain.   Musculoskeletal:  Positive for gait problem. Negative for back pain.   Skin:  Negative for color change and rash.   Neurological:  Positive for weakness. Negative for speech difficulty and numbness.   Psychiatric/Behavioral:  Negative for agitation and confusion.    Objective:     Vital Signs (Most Recent):  Temp: 98.9 °F (37.2 °C) (22 1208)  Pulse: 80 (22 1200)  Resp: 18 (22 0759)  BP: (!) 74/0 (22 1208)  SpO2: 97 % (22 1208)      Vital Signs (24h Range):  Temp:  [97.7 °F (36.5 °C)-98.9 °F (37.2 °C)] 98.9 °F (37.2 °C)  Pulse:  [] 80  Resp:  [16-18] 18  SpO2:  [95 %-99 %] 97 %  BP: (66-74)/(0) 74/0     Body mass index is 26.87 kg/m².    Physical Exam  Vitals and nursing note reviewed.   Constitutional:       Appearance: Normal appearance. He is well-developed.   HENT:      Head: Normocephalic and atraumatic.   Eyes:      General:         Right eye: No discharge.          Left eye: No discharge.      Pupils: Pupils are equal, round, and reactive to light.   Neck:      Comments: trach  Pulmonary:      Effort: Pulmonary effort is normal. No respiratory distress.   Abdominal:      General: There is no distension.      Tenderness: There is no abdominal tenderness.   Musculoskeletal:         General: No deformity.      Comments: Deconditioned   Skin:     General: Skin is warm and dry.      Comments: Wound vac   Neurological:      Mental Status: He is alert. Mental status is at baseline.      Sensory: No sensory deficit.      Motor: Weakness present. No abnormal muscle tone.      Gait: Gait abnormal.   Psychiatric:         Mood and Affect: Mood normal.         Behavior: Behavior normal.     Diagnostic Results: Labs: Reviewed  ECG: Reviewed  CT: Reviewed    Assessment/Plan:     * LVAD (left ventricular assist device) present  - stage D HF with an EF of 10%, LVAD placed in 2018  - Went to OR for HM3 upgrade on 7/13/22.     Impaired mobility  - Related to prolonged/acute hospital course.     Recommendations  -  Encourage mobility, OOB in chair at least 3 hours per day, and early ambulation as appropriate  -  PT/OT evaluate and treat  -  Pain management  -  Monitor for and prevent skin breakdown and pressure ulcers  · Early mobility, repositioning/weight shifting every 20-30 minutes when sitting, turn patient every 2 hours, proper mattress/overlay and chair cushioning, pressure relief/heel protector boots  -  DVT prophylaxis    -  Reviewed discharge options (IP rehab, SNF, HH therapy, and OP therapy)    VT (ventricular tachycardia)  - EP consulted and on Mexiletine PO and Amio    Chronic combined systolic and diastolic heart failure  - stage D HF with an EF of 10%    PM&R Recommendation:     At this time, the PM&R team has reviewed this patient's ongoing medical case including inpatient diagnosis, medical history, clinical examination, labs, vitals, current social and functional history  to provide the post-acute recommendation as follows:     RECOMMENDATIONS: inpatient rehabilitation due to good motivation/participation with therapies and good potential for recovery.    MEDICAL STABILITY:     At this time, barriers for post-acute rehab admission include down to 5L trach collar for O2 requirement and plan for wound vac.      We will continue to follow.     Thank you for your consult.     Jessica Sal NP  Department of Physical Medicine & Rehab  [facility] - Cardiology Stepdown

## 2022-08-30 NOTE — PROGRESS NOTES
08/30/2022  Carissa Daen    Current provider:  Roslyn Ragsdale DO    Device interrogation:  TXP LVAD INTERROGATIONS 8/30/2022 8/30/2022 8/30/2022 8/29/2022 8/29/2022 8/29/2022 8/29/2022   Type HeartMate3 HeartMate3 HeartMate3 HeartMate3 HeartMate3 HeartMate3 HeartMate3   Flow 5.3 5.1 5.3 5.4 5.4 5.3 5.4   Speed 6300 6300 6300 6300 6300 6300 6300   PI 3.5 4.0 2.6 3.1 2.7 3.1 3.4   Power (Jackson) 5.3 5.3 5.3 5.2 5.3 5.3 5.3   LSL 5900 - - 5900 - - -   Low Flow Alarm no no no No - - -   High Power Alarm no - - No - - -   Pulsatility Intermittent pulse Intermittent pulse Intermittent pulse Intermittent pulse - - -          Rounded on Tim Richards to ensure all mechanical assist device settings (IABP or VAD) were appropriate and all parameters were within limits.  I was able to ensure all back up equipment was present, the staff had no issues, and the Perfusion Department daily rounding was complete.      For implantable VADs: Interrogation of Ventricular assist device was performed with analysis of device parameters and review of device function. I have personally reviewed the interrogation findings and agree with findings as stated.     In emergency, the nursing units have been notified to contact the perfusion department either by:  Calling [phone] from 630am to 4pm Mon thru Fri, utilizing the On-Call Finder functionality of Epic and searching for Perfusion, or by contacting the hospital  from 4pm to 630am and on weekends and asking to speak with the perfusionist on call.    10:51 AM

## 2022-08-31 LAB
ANION GAP SERPL CALC-SCNC: 7 MMOL/L (ref 8–16)
BNP SERPL-MCNC: 364 PG/ML (ref 0–99)
BUN SERPL-MCNC: 16 MG/DL (ref 6–20)
CALCIUM SERPL-MCNC: 9.2 MG/DL (ref 8.7–10.5)
CHLORIDE SERPL-SCNC: 98 MMOL/L (ref 95–110)
CO2 SERPL-SCNC: 31 MMOL/L (ref 23–29)
CREAT SERPL-MCNC: 1 MG/DL (ref 0.5–1.4)
CRP SERPL-MCNC: 20.5 MG/L (ref 0–8.2)
EST. GFR  (NO RACE VARIABLE): >60 ML/MIN/1.73 M^2
GLUCOSE SERPL-MCNC: 72 MG/DL (ref 70–110)
INR PPP: 2.2 (ref 0.8–1.2)
LDH SERPL L TO P-CCNC: 272 U/L (ref 110–260)
MAGNESIUM SERPL-MCNC: 2.1 MG/DL (ref 1.6–2.6)
PHOSPHATE SERPL-MCNC: 3.5 MG/DL (ref 2.7–4.5)
POCT GLUCOSE: 102 MG/DL (ref 70–110)
POCT GLUCOSE: 103 MG/DL (ref 70–110)
POCT GLUCOSE: 103 MG/DL (ref 70–110)
POCT GLUCOSE: 107 MG/DL (ref 70–110)
POCT GLUCOSE: 88 MG/DL (ref 70–110)
POCT GLUCOSE: 97 MG/DL (ref 70–110)
POCT GLUCOSE: 97 MG/DL (ref 70–110)
POTASSIUM SERPL-SCNC: 4.1 MMOL/L (ref 3.5–5.1)
PREALB SERPL-MCNC: 24 MG/DL (ref 20–43)
PROTHROMBIN TIME: 21.8 SEC (ref 9–12.5)
SODIUM SERPL-SCNC: 136 MMOL/L (ref 136–145)
TRIGL SERPL-MCNC: 68 MG/DL (ref 30–150)

## 2022-08-31 PROCEDURE — 83880 ASSAY OF NATRIURETIC PEPTIDE: CPT | Performed by: INTERNAL MEDICINE

## 2022-08-31 PROCEDURE — 93750 PR INTERROGATE VENT ASSIST DEV, IN PERSON, W PHYSICIAN ANALYSIS: ICD-10-PCS | Mod: ,,, | Performed by: INTERNAL MEDICINE

## 2022-08-31 PROCEDURE — 94761 N-INVAS EAR/PLS OXIMETRY MLT: CPT

## 2022-08-31 PROCEDURE — 99233 PR SUBSEQUENT HOSPITAL CARE,LEVL III: ICD-10-PCS | Mod: ,,, | Performed by: INTERNAL MEDICINE

## 2022-08-31 PROCEDURE — 83615 LACTATE (LD) (LDH) ENZYME: CPT | Performed by: INTERNAL MEDICINE

## 2022-08-31 PROCEDURE — 25000003 PHARM REV CODE 250: Performed by: STUDENT IN AN ORGANIZED HEALTH CARE EDUCATION/TRAINING PROGRAM

## 2022-08-31 PROCEDURE — 85610 PROTHROMBIN TIME: CPT | Performed by: STUDENT IN AN ORGANIZED HEALTH CARE EDUCATION/TRAINING PROGRAM

## 2022-08-31 PROCEDURE — 97530 THERAPEUTIC ACTIVITIES: CPT

## 2022-08-31 PROCEDURE — 25000003 PHARM REV CODE 250: Performed by: INTERNAL MEDICINE

## 2022-08-31 PROCEDURE — 94640 AIRWAY INHALATION TREATMENT: CPT

## 2022-08-31 PROCEDURE — 63600175 PHARM REV CODE 636 W HCPCS: Performed by: STUDENT IN AN ORGANIZED HEALTH CARE EDUCATION/TRAINING PROGRAM

## 2022-08-31 PROCEDURE — 97112 NEUROMUSCULAR REEDUCATION: CPT

## 2022-08-31 PROCEDURE — 27000221 HC OXYGEN, UP TO 24 HOURS

## 2022-08-31 PROCEDURE — 84100 ASSAY OF PHOSPHORUS: CPT | Performed by: STUDENT IN AN ORGANIZED HEALTH CARE EDUCATION/TRAINING PROGRAM

## 2022-08-31 PROCEDURE — 86140 C-REACTIVE PROTEIN: CPT | Performed by: INTERNAL MEDICINE

## 2022-08-31 PROCEDURE — 99900026 HC AIRWAY MAINTENANCE (STAT)

## 2022-08-31 PROCEDURE — 25000242 PHARM REV CODE 250 ALT 637 W/ HCPCS

## 2022-08-31 PROCEDURE — 93750 INTERROGATION VAD IN PERSON: CPT | Mod: ,,, | Performed by: INTERNAL MEDICINE

## 2022-08-31 PROCEDURE — 84478 ASSAY OF TRIGLYCERIDES: CPT | Performed by: INTERNAL MEDICINE

## 2022-08-31 PROCEDURE — 97116 GAIT TRAINING THERAPY: CPT

## 2022-08-31 PROCEDURE — 80048 BASIC METABOLIC PNL TOTAL CA: CPT | Performed by: INTERNAL MEDICINE

## 2022-08-31 PROCEDURE — 20600001 HC STEP DOWN PRIVATE ROOM

## 2022-08-31 PROCEDURE — 84134 ASSAY OF PREALBUMIN: CPT | Performed by: INTERNAL MEDICINE

## 2022-08-31 PROCEDURE — 99233 SBSQ HOSP IP/OBS HIGH 50: CPT | Mod: ,,, | Performed by: INTERNAL MEDICINE

## 2022-08-31 PROCEDURE — 99900035 HC TECH TIME PER 15 MIN (STAT)

## 2022-08-31 PROCEDURE — 27000248 HC VAD-ADDITIONAL DAY

## 2022-08-31 PROCEDURE — 25000003 PHARM REV CODE 250: Performed by: NURSE PRACTITIONER

## 2022-08-31 PROCEDURE — 83735 ASSAY OF MAGNESIUM: CPT | Performed by: STUDENT IN AN ORGANIZED HEALTH CARE EDUCATION/TRAINING PROGRAM

## 2022-08-31 PROCEDURE — 97535 SELF CARE MNGMENT TRAINING: CPT

## 2022-08-31 RX ADMIN — MEXILETINE HYDROCHLORIDE 250 MG: 250 CAPSULE ORAL at 06:08

## 2022-08-31 RX ADMIN — MIRTAZAPINE 30 MG: 15 TABLET, FILM COATED ORAL at 09:08

## 2022-08-31 RX ADMIN — LEVOTHYROXINE SODIUM 50 MCG: 50 TABLET ORAL at 06:08

## 2022-08-31 RX ADMIN — TORSEMIDE 20 MG: 20 TABLET ORAL at 09:08

## 2022-08-31 RX ADMIN — WARFARIN SODIUM 7.5 MG: 7.5 TABLET ORAL at 05:08

## 2022-08-31 RX ADMIN — ASPIRIN 81 MG CHEWABLE TABLET 81 MG: 81 TABLET CHEWABLE at 09:08

## 2022-08-31 RX ADMIN — SENNOSIDES AND DOCUSATE SODIUM 1 TABLET: 50; 8.6 TABLET ORAL at 09:08

## 2022-08-31 RX ADMIN — PANTOPRAZOLE SODIUM 40 MG: 40 TABLET, DELAYED RELEASE ORAL at 09:08

## 2022-08-31 RX ADMIN — LEVALBUTEROL HYDROCHLORIDE 0.63 MG: 0.63 SOLUTION RESPIRATORY (INHALATION) at 12:08

## 2022-08-31 RX ADMIN — MEXILETINE HYDROCHLORIDE 250 MG: 250 CAPSULE ORAL at 09:08

## 2022-08-31 RX ADMIN — Medication 400 MG: at 09:08

## 2022-08-31 RX ADMIN — LEVALBUTEROL HYDROCHLORIDE 0.63 MG: 0.63 SOLUTION RESPIRATORY (INHALATION) at 07:08

## 2022-08-31 RX ADMIN — PREDNISONE 5 MG: 2.5 TABLET ORAL at 09:08

## 2022-08-31 RX ADMIN — MEXILETINE HYDROCHLORIDE 250 MG: 250 CAPSULE ORAL at 02:08

## 2022-08-31 RX ADMIN — AMIODARONE HYDROCHLORIDE 400 MG: 200 TABLET ORAL at 09:08

## 2022-08-31 NOTE — NURSING
"DLES assessed. Site is still a "2" due to not being fully incorporated and suture. Suture not taken out at this time. Will reassess next week.   "

## 2022-08-31 NOTE — PROGRESS NOTES
Update    Pt presents as AAO x4, calm, cooperative, and asking and answering questions appropriately, caregivers not present. Pt states he is doing well and has been watching TV. Pt had no additional needs or questions during visit. LCSW offered support and encouragement. SW providing ongoing psychosocial, counseling, & emotional support, education, resources, assistance, and discharge planning as indicated.  SW to continue to follow.

## 2022-08-31 NOTE — PT/OT/SLP PROGRESS
Physical Therapy Co-Treatment    Patient Name:  Tim Richards   MRN:  [id number]    Recommendations:     Discharge Recommendations:  rehabilitation facility   Discharge Equipment Recommendations: walker, rolling   Barriers to discharge: decreased functional mobility, fall risk, decreased caregiver support, and inaccessible home    Assessment:     Tim Ricahrds is a 56 y.o. male admitted with a medical diagnosis of LVAD (left ventricular assist device) present.  Pt demonstrates the below listed impairments with decreased tolerance to functional mobility, impaired endurance, and gait instability being the most limiting.  Pt demonstrates improved tolerance to out of bed mobility and is willing to ambulate in his room this day with a chair follow.  Pt educated to the benefits of out of bed mobility and is willing to stay in the chair at then end of the session.  Pt requires skilled PT due to patient's status as: a fall risk to allow safe d/c home.     Impairments and functional limitations:  weakness, impaired endurance, impaired self care skills, impaired functional mobility, gait instability, impaired balance, decreased upper extremity function, decreased lower extremity function, decreased ROM, impaired cardiopulmonary response to activity.  These deficits affect their roles and responsibilities in which they were able to complete prior to admit.  Rehab Prognosis:   Good ; patient would benefit from acute skilled PT services 5 x/week to address these deficits, improve quality of life, focus on recovery of impairments, provide patient/caregiver education, reduce fall risk, and reach maximum level of function.  Pt is highly  motivated to participated in skilled PT.    Recent Surgery:   Procedure(s) (LRB):  CREATION, TRACHEOSTOMY (N/A)  INSERTION, CENTRAL VENOUS ACCESS DEVICE 27 Days Post-Op    Plan:     During this hospitalization, patient to be seen 5 x/week to address the identified rehab impairments via gait  "training, therapeutic activities, therapeutic exercises, neuromuscular re-education and progress toward the following goals:    Plan of Care Expires:  09/18/22    Subjective     Chief Complaint: " you better not leave me in this chair"  Patient/Family Comments/Goals: Progress to inpatient rehab  Pain/Comfort:  Pain Rating 1: 0/10    Objective:     Communicated with RN prior to session.  Patient found HOB elevated with telemetry, oxygen, Tracheostomy, PICC line, LVAD upon PT entry to room.     General Precautions: Standard, fall, LVAD   Orthopedic Precautions:N/A   Braces: N/A  Oxygen Device:      Functional Mobility:  Bed Mobility:  Rolling Left: stand by assistance  Scooting: stand by assistance  Supine to Sit: stand by assistance  Head of bed position: HOB elevated  Switches LVAD from wall to battery with OT at bedside     Transfers:  Sit to Stand: moderate assistance with rolling walker with cues for hand placement and foot placement  Performs x2 stands     Gait: Patient ambulated 6' and 9' with rolling walker and minimum assistance. Patient demonstrates unsteady gait, decreased step length, narrow base of support, decreased weight shift, flexed posture, and decreased arminda. All lines remained intact throughout ambulation trial, mask donned for out of room ambulation, chair follow for patient safety, portable Supplemental O2 6L utilized.  Maximal encouragement   On battery power with consolidation bag and emergency bag present     Balance:   Position Score Time   Static Sitting GOOD: Takes MODERATE challenges n/a   Dynamic Sitting FAIR+: Maintains balance through MINIMAL excursions of active trunk motion n/a   Static Standing FAIR-: Maintains without assist but is inconsistent n/a   Dynamic Standing POOR+: MINIMAL assist to maintain n/a       AM-PAC 6 CLICK MOBILITY  Turning over in bed (including adjusting bedclothes, sheets and blankets)?: 3  Sitting down on and standing up from a chair with arms (e.g., " wheelchair, bedside commode, etc.): 2  Moving from lying on back to sitting on the side of the bed?: 3  Moving to and from a bed to a chair (including a wheelchair)?: 2  Need to walk in hospital room?: 2  Climbing 3-5 steps with a railing?: 1  Basic Mobility Total Score: 13     Therapeutic Activities:  Patient educated on role of acute care PT and PT POC, safety while in hospital including calling nurse for mobility, call light usage, benefits of out of bed mobility, walker management, breathing technique, fall risk, assistive device use, bed mobility , transfers, gait technique, positioning, posture, risks of prolonged bed rest, possible discharge disposition , and benefits of continued PT by explanation and demonstration.    Patient demonstrates good understanding of education provided this day.   Whiteboard updated    Therapeutic Exercises:  n/a    Patient left up in chair with all lines intact, call button in reach, and RN present.    GOALS:   Multidisciplinary Problems       Physical Therapy Goals          Problem: Physical Therapy    Goal Priority Disciplines Outcome Goal Variances Interventions   Physical Therapy Goal     PT, PT/OT Ongoing, Progressing     Description: Goals to be met by: 2022    Patient will increase functional independence with mobility by performin. Supine to sit with MInimal Assistance -MET 2022  2. Sit to stand transfer with Contact Guard Assistance with AD if needed -not met  3. Gait  x 150 feet with Contact Guard Assistance with assistive device -not met  4. Ascend/descend 8 stair with right Handrails Contact Guard Assistance -not met  5. Sitting at edge of bed x15 minutes with Contact Guard Assistance to improve tolerance to activities. -met 2022  6. Lower extremity exercise program x15 reps with assistance as needed -not met  7. Supine to sit with independence  8.Pt will be able to stand with CGA to perform tasks and improve standing balance.                  Multidisciplinary Problems (Resolved)          Problem: Physical Therapy    Goal Priority Disciplines Outcome Goal Variances Interventions   Physical Therapy Goal   (Resolved)     PT, PT/OT Met     Description: The patient is near his functional baseline, he ambulated within the room with no AD and independence. Unable to do stair training, assess gait endurance due to COVID restrictions. He is safe to return home with no therapy needs. He is in agreement with PT discharge, he states he is confident he can ascend/descend his stairs.           Problem: Physical Therapy    Goal Priority Disciplines Outcome Goal Variances Interventions   Physical Therapy Goal   (Resolved)     PT, PT/OT Met                         Time Tracking:     PT Received On: 08/31/22  PT Start Time: 0930     PT Stop Time: 1005  PT Total Time (min): 35 min     Billable Minutes: Gait Training 20 and Therapeutic Activity 15    Treatment Type: Treatment  PT/PTA: PT     PTA Visit Number: 0     08/31/2022    Co-treatment performed for this visit due to patient need for two skilled therapists to ensure patient and staff safety, to accommodate for patient activity tolerance/pain management, and maximize functional potential.

## 2022-08-31 NOTE — RESPIRATORY THERAPY
RAPID RESPONSE RESPIRATORY THERAPY PROACTIVE NOTE           Time of visit: 715     Code Status: Full Code   : 1965  Bed: 311/311 A:   MRN: [id number]  Time spent at the bedside: < 15 min    SITUATION    Evaluated patient for: LDA Check     BACKGROUND    Patient has a past medical history of A-fib, Anticoagulant long-term use, Atrial flutter, CHF (congestive heart failure), Class 1 obesity due to excess calories with serious comorbidity and body mass index (BMI) of 31.0 to 31.9 in adult, Class 1 obesity due to excess calories with serious comorbidity and body mass index (BMI) of 32.0 to 32.9 in adult, Dilated cardiomyopathy, Disorder of kidney and ureter, Encounter for blood transfusion, Essential hypertension, Gout, HTN (hypertension), psychiatric care, ICD (implantable cardioverter-defibrillator) infection, Psychiatric problem, Thyroid disease, and Ventricular tachycardia (paroxysmal).  Clinically Significant Surgical Hx: tracheostomy    24 Hours Vitals Range:  Temp:  [97.5 °F (36.4 °C)-98.7 °F (37.1 °C)]   Pulse:  [67-90]   Resp:  [16-20]   BP: (64-92)/(0-57)   SpO2:  [95 %-100 %]     Labs:    Recent Labs     22  0428 22  0454 22  0520   * 134* 136   K 4.7 4.6 4.1   CL 98 99 98   CO2 29 30* 31*   CREATININE 1.0 0.9 1.0   GLU 72 104 72   PHOS 3.3 3.3 3.5   MG 2.1 2.3 2.1        No results for input(s): PH, PCO2, PO2, HCO3, POCSATURATED, BE in the last 72 hours.    ASSESSMENT/INTERVENTIONS  All supplies at bedside.      Last VS   Temp: 97.5 °F (36.4 °C) ( 1537)  Pulse: 79 ( 1600)  Resp: 18 ( 1600)  BP: 92/57 (1600)  SpO2: 96 % ( 1622)      Extra trachs at bedside: 6 & 8 shiley cuffed  Level of Consciousness: Level of Consciousness (AVPU): alert  Respiratory Effort: Respiratory Effort: Unlabored Expansion/Accessory Muscle Usage: Expansion/Accessory Muscles/Retractions: no retractions, no use of accessory muscles  All Lung Field Breath Sounds: All Lung Fields  Breath Sounds: diminished  SHERWIN Breath Sounds: Anterior:, Posterior:, Lateral:, clear  LLL Breath Sounds: Posterior:, diminished  RUL Breath Sounds: Anterior:, Posterior:, Lateral:, diminished  RML Breath Sounds: Anterior:, Posterior:, Lateral:, diminished  RLL Breath Sounds: Posterior:, diminished  O2 Device/Concentration: tc 5l 28%  Surgical airway: Yes, Type: Shiley Size: 8, uncuffed  Ambu at bedside: Ambu bag with the patient?: Yes, Adult Ambu     Active Orders   Respiratory Care    Chest physiotherapy Q6H PRN     Frequency: Q6H PRN     Number of Occurrences: Until Specified     Order Questions:      Indications: COPIOUS SPUTUM PRODUCTION    Inhalation Treatment Q6H     Frequency: Q6H     Number of Occurrences: Until Specified    Oxygen Continuous     Frequency: Continuous     Number of Occurrences: Until Specified     Order Questions:      Device type: Low flow      Device: Trach Collar      FiO2%: Wean as tolerated      Titrate O2 per Oxygen Titration Protocol: Yes      To maintain SpO2 goal of: >= 90%      Notify MD of: Inability to achieve desired SpO2; Sudden change in patient status and requires 20% increase in FiO2; Patient requires >60% FiO2    Pulse Oximetry Q4H     Frequency: Q4H     Number of Occurrences: Until Specified    Respiratory care evaluation only Once     Frequency: Once     Number of Occurrences: 1 Occurrences     Order Comments: Consult for tracheostomy exchange/downsize      RESPIRATORY COMMUNICATION     Frequency: Once     Number of Occurrences: 1 Occurrences     Order Comments: Rehab requires that he be on 5L or less O2. Continue to wean aggressively      Routine tracheostomy care     Frequency: BID     Number of Occurrences: Until Specified       RECOMMENDATIONS    We recommend: RRT Recs: Continue POC per primary team.      FOLLOW-UP    Please call back the Rapid Response RT, Yenny Rao, RRT at x [phone] for any questions or concerns.

## 2022-08-31 NOTE — PT/OT/SLP PROGRESS
"Occupational Therapy  Co- Treatment    Name: Tim Richards  MRN: [id number]  Admitting Diagnosis:  LVAD (left ventricular assist device) present  27 Days Post-Op    Recommendations:     Discharge Recommendations: rehabilitation facility    Assessment:     Tim Richards is a 56 y.o. male with a medical diagnosis of LVAD (left ventricular assist device) present.  Performance deficits affecting function are weakness, impaired endurance, impaired self care skills, impaired functional mobility, gait instability, impaired balance.   Pt tolerated session well. Pt is progressing slowly but remained engaged and motivated this date. Pt remains good candidate for IP REHAB prior to return home.   Rehab Prognosis:  Good; patient would benefit from acute skilled OT services to address these deficits and reach maximum level of function.       Plan:     Patient to be seen 5 x/week to address the above listed problems via self-care/home management, therapeutic activities, therapeutic exercises  Plan of Care Expires: 08/25/22  Plan of Care Reviewed with: patient    Subjective   Pt states at start of session.  " I don't trust you.  I feel like y'all are trying to pull the wool over my eyes. " Pt states.   At end of session, Pt states, "Well, that went better than I thought it would."       Pain/Comfort:  Pain Rating 1: 0/10    Objective:     Communicated with: nsg prior to session. Pt found supine in bed with LVAD to wall power. Pt with 5 LPM via trach, PSV in place. Pt  with tele and wound vac.  Cotx completed this date to optimize functional performance and safety given impaired tolerance for activity.   General Precautions: Standard, fall, LVAD     Occupational Performance:     Bed Mobility:    Supine>sit with SBA     Functional Mobility/Transfers:  Sit>Stand from bed and chair with MOD A.   Despite repeated education, pt with poor hand placement with sit<>stand transition. Pt also requiring cues for anterior weight shift " "and rocking motion to increased momentum with sit>stand transition.     Activities of Daily Living:  Feeding: independent  LE dressing; MAX A   Self care skills completed as related to LVAD mildred't and donning of shoulder consolidation bag as needed to progress with functional activity.     [facility] 6 Click ADL: 17    Treatment & Education:  Pt completed self test with cues for initiation. OT provided minimal assist to document numbers in LVAD binder.   Pt transitioned LVAD to battery power with set-up. Pt did need cues to stay on task as he appeared distracted by upcoming therapy session. Pt stating during LVAD mildred't, "so, what is your plan?", "I'm not sitting in that chair", "I sat in the chair for 21/2 hours and yall just left me there".   Pt placed items in shoulder consolidation bag with independence with MIN A to adjust and fasten straps.     Education provided re: importance of OOB to chair daily. Nsg arrived to room with therapy to discuss plan for OOB and return to chair. Nsg to come back to room with additional person to assist pt back to bed in 2 hours. Pt/nsg and therapy all in agreement of plan.     Pt completed 2 trials of functional mobility with RW. Encouragement provided as well as seated rest break between trials. Pt with SOB noted with saturation >95%.     Education provided re: OT POC and safety with functional mobility/ADl skills.     Patient left up in chair with all lines intact, call button in reach, and nsg notified  Chair is modified with blanket rolls to increased surface height of chair.   GOALS:   Multidisciplinary Problems       Occupational Therapy Goals          Problem: Occupational Therapy    Goal Priority Disciplines Outcome Interventions   Occupational Therapy Goal     OT, PT/OT Ongoing, Progressing    Description: Goals to be met by: 8/9/22     Patient will increase functional independence with ADLs by performing:    UE Dressing with Stand-by Assistance.  LE Dressing with Stand-by " Assistance.  Grooming while standing at sink with Stand-by Assistance.  Toileting from toilet with Stand-by Assistance for hygiene and clothing management.   Toilet transfer to toilet with Stand-by Assistance.                   Multidisciplinary Problems (Resolved)          Problem: Occupational Therapy    Goal Priority Disciplines Outcome Interventions   Occupational Therapy Goal   (Resolved)     OT, PT/OT Met           Problem: Occupational Therapy    Goal Priority Disciplines Outcome Interventions   Occupational Therapy Goal   (Resolved)     OT, PT/OT Met                        Time Tracking:     OT Date of Treatment: 08/31/22  OT Start Time: 0930  OT Stop Time: 1005  OT Total Time (min): 35 min    Billable Minutes:Self Care/Home Management 15  Therapeutic Activity 20    OT/CARY: OT          8/31/2022

## 2022-08-31 NOTE — PHYSICIAN QUERY
PT Name: Tim Richards  MR #: [id number]    DOCUMENTATION CLARIFICATION     CDS/: Waleska Vasquez               Contact information:[email or web address]    This form is a permanent document in the medical record.     Query Date: August 31, 2022    By submitting this query, we are merely seeking further clarification of documentation. Please utilize your independent clinical judgment when addressing the question(s) below.    Supporting Clinical Findings Location in Medical Record   Culture, VAP (BAL)     Collected: 07/20/22 1120       VAP BAL CULTURE KLEBSIELLA AEROGENES  Few  Normal respiratory sachin also present    IV Vancomycin Transplant heart note 7-23     Being treated for sepsis.   On cefepime and flagyl.      Fever  Leukocytosis with associated fever up to 100.9 F status post creation of tracheostomy on 8/4/22. Blood cultures this admission have been unrevealing. Only notable pathogen isolated was Klebsiella aerogenes from sputum for which patient has been treated with cefepime. Metronidazole was added for anaerobic coverage.      Recommendations  --Continue IV cefepime and metronidazole for originally anticipated 14 day pneumonia course; stop date 8/9/22      Transplant heart note 8-4        Infectious disease note 8-5       Provider, please clarify if there is any clinical correlation between Sepsis and   Klebsiella aerogenes.           Are the conditions:      [ x ] Due to or associated with each other   [  ] Unrelated to each other   [  ] Other explanation (Please Specify): ______________   [  ] Clinically Undetermined                                                                               Please document in your progress notes daily for the duration of treatment until resolved and include in your discharge summary.

## 2022-08-31 NOTE — PLAN OF CARE
Recommendations  1. Continue current cardiac diet-fluid per MD  2. RD following      Goals: Continue to meet % of EEN/EPN by RD f/u  Nutrition Goal Status: progressing towards goal  Communication of RD Recs: other (POC)

## 2022-08-31 NOTE — ASSESSMENT & PLAN NOTE
-The patient presented with COVID and found to have an uptrending LDH with increased power. He underwent HM III upgrade on 7/13/22.  -ASA 81mg.  -INR goal 2-3. therapeutic.   -BP controlled.   -Restarted Po diuretic.   -Echo 8/30: EF 15%, LViDD 7.44, TAPSE 1.55, AV does not open. The ventricular septum is at midline.   -PT/OT/Speech. Will likely need rehab.     Procedure: Device Interrogation Including analysis of device parameters  Current Settings: Ventricular Assist Device    TXP LVAD INTERROGATIONS 8/31/2022 8/31/2022 8/30/2022 8/30/2022 8/30/2022 8/30/2022 8/30/2022   Type HeartMate3 HeartMate3 HeartMate3 HeartMate3 HeartMate3 HeartMate3 HeartMate3   Flow 5.3 5.5 5.2 5.3 5.5 5.6 5.3   Speed 6300 6300 6300 6300 6300 6300 6300   PI 3.2 3.4 3.7 3.3 2.2 2.0 3.5   Power (Jackson) 5.2 5.3 5.3 5.3 5.4 5.3 5.3   LSL 5900 - - - 5900 5900 5900   Low Flow Alarm - no - - - - no   High Power Alarm - - - - - - no   Pulsatility - Intermittent pulse Intermittent pulse Intermittent pulse - - Intermittent pulse

## 2022-08-31 NOTE — NURSING
Pt sitting up in chair, no family at bedside. Pt eager to get to rehab as he is tired of waiting to have more therapy here. Pt asking about his trach. I informed that our team is discussing with rehab on their requirements for his trach and oxygen. Pt verbalized understanding.

## 2022-08-31 NOTE — NURSING
Patient assisted back to be with 2 person assist. Patient stood and pivoted, only able to stand for a few seconds

## 2022-08-31 NOTE — PLAN OF CARE
POC reviewed with patient. VSS. Doppler pressures obtained. Denies pain. Patient has been tolerating O2 at 5l/28% trach collar.  OOB to chair with therapy for 2 hours today. Dressing change to LVAD completed with kit. DLES WNL suture still in place. Call light in reach  Problem: Adult Inpatient Plan of Care  Goal: Plan of Care Review  Outcome: Ongoing, Progressing  Flowsheets (Taken 8/31/2022 1736)  Plan of Care Reviewed With: patient  Goal: Optimal Comfort and Wellbeing  Outcome: Ongoing, Progressing  Intervention: Monitor Pain and Promote Comfort  Flowsheets (Taken 8/31/2022 1736)  Pain Management Interventions:   medication offered but refused   pain management plan reviewed with patient/caregiver   pillow support provided

## 2022-08-31 NOTE — PROGRESS NOTES
08/31/2022  Carissa Dean    Current provider:  Amy Rhodes MD    Device interrogation:  TXP LVAD INTERROGATIONS 8/31/2022 8/31/2022 8/30/2022 8/30/2022 8/30/2022 8/30/2022 8/30/2022   Type HeartMate3 HeartMate3 HeartMate3 HeartMate3 HeartMate3 HeartMate3 HeartMate3   Flow 5.3 5.5 5.2 5.3 5.5 5.6 5.3   Speed 6300 6300 6300 6300 6300 6300 6300   PI 3.2 3.4 3.7 3.3 2.2 2.0 3.5   Power (Jackson) 5.2 5.3 5.3 5.3 5.4 5.3 5.3   LSL 5900 - - - 5900 5900 5900   Low Flow Alarm - no - - - - no   High Power Alarm - - - - - - no   Pulsatility - Intermittent pulse Intermittent pulse Intermittent pulse - - Intermittent pulse          Rounded on Tim Richards to ensure all mechanical assist device settings (IABP or VAD) were appropriate and all parameters were within limits.  I was able to ensure all back up equipment was present, the staff had no issues, and the Perfusion Department daily rounding was complete.      For implantable VADs: Interrogation of Ventricular assist device was performed with analysis of device parameters and review of device function. I have personally reviewed the interrogation findings and agree with findings as stated.     In emergency, the nursing units have been notified to contact the perfusion department either by:  Calling [phone] from 630am to 4pm Mon thru Fri, utilizing the On-Call Finder functionality of Epic and searching for Perfusion, or by contacting the hospital  from 4pm to 630am and on weekends and asking to speak with the perfusionist on call.    11:32 AM

## 2022-08-31 NOTE — PROGRESS NOTES
"[facility]  Adult Nutrition  Progress Note    SUMMARY       Recommendations  1. Continue current cardiac diet-fluid per MD  2. RD following     Goals: Continue to meet % of EEN/EPN by RD f/u  Nutrition Goal Status: progressing towards goal  Communication of RD Recs: other (POC)    Assessment and Plan    Nutrition Problem  Increased protein/energy needs    Related to (etiology):   Physiological demands    Signs and Symptoms (as evidenced by):   ~50-75% meal consumption    Interventions(treatment strategy):  Collaboration of nutrition care w/ other providers     Nutrition Diagnosis Status:   Continues      Reason for Assessment    Reason For Assessment: RD follow-up  Diagnosis:  (LVAD complication)  Relevant Medical History: CHF, gout, cardiomyopathy  Interdisciplinary Rounds: attended  General Information Comments: Spoke w/ pt at bedside, reports a good appetite currently w/ > 50% meal consumption most meals- RN documentation confirms. Pt reports drinking most of Boost provided (100% most meals) for optimization of protein and calorie intake. Pt denies any issues w/ chewing/swallowing at this time. Pt had no additional questions with me today. Visual NFPE completed, pt w/ no s/s of malnutrition at this time. LBM noted-8/29/22  Nutrition Discharge Planning: adequate nutrition intake    Nutrition Risk Screen    Nutrition Risk Screen: no indicators present    Nutrition/Diet History    Spiritual, Cultural Beliefs, Moravian Practices, Values that Affect Care: no  Food Allergies: NKFA    Anthropometrics    Temp: 97.7 °F (36.5 °C)  Height Method: Stated  Height: 6' 1" (185.4 cm)  Height (inches): 73 in  Weight Method: Bed Scale  Weight: 92.1 kg (203 lb)  Weight (lb): 203 lb  Ideal Body Weight (IBW), Male: 184 lb  % Ideal Body Weight, Male (lb): 110.33 %  BMI (Calculated): 26.8  BMI Grade: 25 - 29.9 - overweight       Lab/Procedures/Meds    Pertinent Labs Reviewed: reviewed  Pertinent Labs " Comments: -  Pertinent Medications Reviewed: reviewed  Pertinent Medications Comments: -    Estimated/Assessed Needs    Weight Used For Calorie Calculations: 90.9 kg (200 lb 6.4 oz)  Energy Calorie Requirements (kcal): 2156 (PAL 1.2)  Energy Need Method: [place]-St Jeor  Protein Requirements: 109 g (1.2 g/kg)  Weight Used For Protein Calculations: 90.9 kg (200 lb 6.4 oz)  Fluid Requirements (mL): 1 ml or fluid per MD     RDA Method (mL): 2156       Nutrition Prescription Ordered    Current Diet Order: Cardiac diet    Evaluation of Received Nutrient/Fluid Intake    I/O: - 5.3 L since 8/17/22  Energy Calories Required: meeting needs  Protein Required: meeting needs  Fluid Required: meeting needs  Total Fluid Intake (mL/kg): 1 ml or fluid per MD  Tolerance: tolerating  % Intake of Estimated Energy Needs: %  % Meal Intake: 50 - 75 %    Nutrition Risk    Level of Risk/Frequency of Follow-up: low ((1 x/week))     Monitor and Evaluation    Food and Nutrient Intake: energy intake, food and beverage intake, enteral nutrition intake, parenteral nutrition intake  Food and Nutrient Adminstration: diet order, enteral and parenteral nutrition administration  Knowledge/Beliefs/Attitudes: food and nutrition knowledge/skill  Physical Activity and Function: nutrition-related ADLs and IADLs  Anthropometric Measurements: weight, weight change  Biochemical Data, Medical Tests and Procedures: electrolyte and renal panel, gastrointestinal profile, glucose/endocrine profile, inflammatory profile, lipid profile  Nutrition-Focused Physical Findings: overall appearance, extremities, muscles and bones     Nutrition Follow-Up    RD Follow-up?: Yes

## 2022-08-31 NOTE — PLAN OF CARE
Pt cooperative with routine care and procedures. Pt VSS. Pt turned as needed and reminded to call for assistance if s/s of distress occur. Bed locked and in lowest position. Call bell and urinal within reach. Pt remained free from s/s of distress and pain. Pt on 8L O2 and 35% oxygen. Pt suctioned as needed. Pt VS closely monitored.      Problem: Adult Inpatient Plan of Care  Goal: Plan of Care Review  Outcome: Ongoing, Progressing  Flowsheets (Taken 8/31/2022 0819)  Plan of Care Reviewed With: patient  Goal: Patient-Specific Goal (Individualized)  Outcome: Ongoing, Progressing  Flowsheets (Taken 8/31/2022 0819)  Anxieties, Fears or Concerns: Wanting to leave hospital asap  Individualized Care Needs: Monitor vs and suction needs  Patient-Specific Goals (Include Timeframe): Pt will remain free from s/s of distress before discharge  Goal: Absence of Hospital-Acquired Illness or Injury  Outcome: Ongoing, Progressing  Goal: Optimal Comfort and Wellbeing  Outcome: Ongoing, Progressing  Goal: Readiness for Transition of Care  Outcome: Ongoing, Progressing     Problem: Infection  Goal: Absence of Infection Signs and Symptoms  Outcome: Ongoing, Progressing     Problem: Neutropenia  Goal: Absence of Infection  Outcome: Ongoing, Progressing     Problem: Fall Injury Risk  Goal: Absence of Fall and Fall-Related Injury  Outcome: Ongoing, Progressing     Problem: Skin Injury Risk Increased  Goal: Skin Health and Integrity  Outcome: Ongoing, Progressing     Problem: Adjustment to Device (Ventricular Assist Device)  Goal: Optimal Adjustment to Device  Outcome: Ongoing, Progressing     Problem: Bleeding (Ventricular Assist Device)  Goal: Absence of Bleeding  Outcome: Ongoing, Progressing     Problem: Coping Ineffective  Goal: Effective Coping  Outcome: Ongoing, Progressing       Problem: Device-Related Complication Risk (Artificial Airway)  Goal: Optimal Device Function  Outcome: Ongoing, Progressing     Problem: Skin and Tissue  Injury (Artificial Airway)  Goal: Absence of Device-Related Skin or Tissue Injury  Outcome: Ongoing, Progressing     Problem: Fluid and Electrolyte Imbalance (Acute Kidney Injury/Impairment)  Goal: Fluid and Electrolyte Balance  Outcome: Ongoing, Progressing     Problem: Oral Intake Inadequate (Acute Kidney Injury/Impairment)  Goal: Optimal Nutrition Intake  Outcome: Ongoing, Progressing     Problem: Renal Function Impairment (Acute Kidney Injury/Impairment)  Goal: Effective Renal Function  Outcome: Ongoing, Progressing

## 2022-08-31 NOTE — PROGRESS NOTES
[facility]  Heart Transplant  Progress Note    Patient Name: Tim Richards  MRN: [id number]  Admission Date: 6/27/2022  Hospital Length of Stay: 65 days  Attending Physician: Amy Rhodes MD  Primary Care Provider: Deyanira Booth MD  Principal Problem:LVAD (left ventricular assist device) present    Subjective:     Interval History: No acute events overnight. Motivated. O2 weaned to 5L via trach collar. Ready to get to rehab.     Continuous Infusions:   dextrose 10 % in water (D10W)       Scheduled Meds:   amiodarone  400 mg Oral Daily    aspirin  81 mg Oral Daily    levalbuterol  0.63 mg Nebulization Q6H    levothyroxine  50 mcg Oral Before breakfast    magnesium oxide  400 mg Oral BID    mexiletine  250 mg Oral Q8H    mirtazapine  30 mg Oral QHS    ondansetron  4 mg Intravenous Once    pantoprazole  40 mg Oral Daily    polyethylene glycol  17 g Oral Daily    predniSONE  5 mg Oral Daily    senna-docusate 8.6-50 mg  1 tablet Oral Daily    torsemide  20 mg Oral Daily    warfarin  7.5 mg Oral Daily     PRN Meds:sodium chloride 0.9%, acetaminophen, ALPRAZolam, bisacodyL, dextrose 10 % in water (D10W), dextrose 10%, dextrose 10%, glucagon (human recombinant), glucose, guaiFENesin 100 mg/5 ml, HYDROmorphone, HYDROmorphone, hydrOXYzine HCL, meclizine, melatonin, ondansetron    Review of patient's allergies indicates:   Allergen Reactions    Lisinopril Anaphylaxis    Hydralazine analogues      Chronic constipation, impotence, dizziness     Objective:     Vital Signs (Most Recent):  Temp: 98.2 °F (36.8 °C) (08/31/22 0744)  Pulse: 80 (08/31/22 0744)  Resp: 20 (08/31/22 0744)  BP: (!) 72/0 (08/31/22 0744)  SpO2: 95 % (08/31/22 0744)   Vital Signs (24h Range):  Temp:  [98.2 °F (36.8 °C)-98.9 °F (37.2 °C)] 98.2 °F (36.8 °C)  Pulse:  [67-90] 80  Resp:  [16-20] 20  SpO2:  [95 %-100 %] 95 %  BP: (64-78)/(0) 72/0     Patient Vitals for the past 72 hrs (Last 3 readings):   Weight    08/30/22 1620 92.1 kg (203 lb)   08/30/22 0400 92.4 kg (203 lb 11.2 oz)   08/29/22 0433 93.7 kg (206 lb 9.1 oz)       Body mass index is 26.78 kg/m².      Intake/Output Summary (Last 24 hours) at 8/31/2022 0809  Last data filed at 8/31/2022 0429  Gross per 24 hour   Intake 600 ml   Output 3405 ml   Net -2805 ml            Telemetry: No significant arrhythmic events overnight.    Physical Exam  Vitals and nursing note reviewed.   Constitutional:       General: He is not in acute distress.     Appearance: Normal appearance. He is well-developed. He is not ill-appearing.   HENT:      Head: Normocephalic and atraumatic.      Ears:      Abel exam findings: Lateralizes left.     Right Rinne: AC > BC.     Left Rinne: AC > BC.     Nose: Nose normal.      Mouth/Throat:      Mouth: Mucous membranes are moist.      Comments: Tracheostomy site is clean and dry without drainage  Eyes:      Extraocular Movements: Extraocular movements intact.      Conjunctiva/sclera: Conjunctivae normal.      Pupils: Pupils are equal, round, and reactive to light.   Cardiovascular:      Rate and Rhythm: Normal rate and regular rhythm.      Pulses: Normal pulses.      Heart sounds: Normal heart sounds.      Comments: VAD hum can be heard, no significant JVD noted on exam  Pulmonary:      Effort: Pulmonary effort is normal.      Breath sounds: Normal breath sounds. No stridor.   Abdominal:      General: Abdomen is flat. Bowel sounds are normal. There is no distension.      Palpations: Abdomen is soft.      Tenderness: There is no abdominal tenderness. There is no guarding.   Musculoskeletal:         General: Normal range of motion.      Cervical back: Normal range of motion and neck supple. No rigidity.   Lymphadenopathy:      Cervical: No cervical adenopathy.   Skin:     General: Skin is warm and dry.      Capillary Refill: Capillary refill takes less than 2 seconds.   Neurological:      General: No focal deficit present.      Mental Status: He  is alert and oriented to person, place, and time.   Psychiatric:         Mood and Affect: Mood normal.         Behavior: Behavior normal.         Thought Content: Thought content normal.         Judgment: Judgment normal.       Significant Labs:  CBC:  Recent Labs   Lab 08/28/22  0506 08/28/22  0719 08/29/22 0428 08/30/22  0454   WBC 6.79  --  5.28 5.14   RBC 2.79*  --  2.58* 2.59*   HGB 7.8* 7.1* 7.1* 7.1*   HCT 26.7*  --  24.5* 23.8*     --  176 167   MCV 96  --  95 92   MCH 28.0  --  27.5 27.4   MCHC 29.2*  --  29.0* 29.8*       BNP:  Recent Labs   Lab 08/26/22  0425 08/29/22 0428 08/31/22  0520   * 171* 364*       CMP:  Recent Labs   Lab 08/24/22  0856 08/25/22  0338 08/26/22 0425 08/29/22 0428 08/30/22  0454 08/31/22  0520   GLU 68* 93   < > 72 104 72   CALCIUM 8.7 8.9   < > 9.1 9.1 9.2   ALBUMIN 2.2* 2.1*  --   --   --   --    PROT 5.9* 5.6*  --   --   --   --    * 134*   < > 133* 134* 136   K 4.5 4.7   < > 4.7 4.6 4.1   CO2 28 28   < > 29 30* 31*   CL 99 100   < > 98 99 98   BUN 17 20   < > 15 16 16   CREATININE 1.1 1.2   < > 1.0 0.9 1.0   ALKPHOS 166* 159*  --   --   --   --    ALT 56* 53*  --   --   --   --    AST 32 29  --   --   --   --    BILITOT 1.3* 1.2*  --   --   --   --     < > = values in this interval not displayed.        Coagulation:   Recent Labs   Lab 08/28/22  0506 08/29/22 0428 08/30/22 0454 08/31/22  0520   INR 1.5* 1.8* 1.9* 2.2*   APTT 40.7* 45.3* 60.6*  --        LDH:  Recent Labs   Lab 08/29/22  0428 08/30/22  0454 08/31/22  0520    285* 272*       Microbiology:  Microbiology Results (last 7 days)       ** No results found for the last 168 hours. **            I have reviewed all pertinent labs within the past 24 hours.    Estimated Creatinine Clearance: 94.3 mL/min (based on SCr of 1 mg/dL).    Diagnostic Results:  I have reviewed all pertinent imaging results/findings within the past 24 hours.    Assessment and Plan:     55 Y/O M with Hx of stage D  HFrEF (EF 10%) due to NICM underwent HM2 implant 3/8/18 and exchange of outflow graft 3/9/18, Hx VT/VF s/p SICD 2014 presenting with gradual worsening shortness of breath associated with nonpleuritic, nonradiating bilateral 4/10 retrosternal chest pressure pain.  Symptoms began yesterday and have gradually worsened in the last 12 hours.  He does report going to a family picnic with increased consumption of chicken wings, ribs and other salty meat products.  Prior to symptom onset, he reports nausea, nonbloody diarrhea began yesterday and single episode of nonbloody nonbilious vomiting this morning. He also complains of dizziness, lightheadedness, and a mild HA. SOB worsened this morning prompting him to come to the ED.     In the ED, patient was in respiratory distress requiring BiPAP. MAP 96 and started on Nitro gtt. Work-up revealed WBC 10, K 5.4, Cr 2.5 (baseline 1.9), BNP 1950 (baseline 200-300s), Bili 2.1, LDH 1058, and INR 3.2. Bedside TTE with severely reduced EF, AV not opening, septum bulging into RV. Given IV Lasix 80 mg x1 with only 100-200 mL UOP and dark in color. HM2 interrogated and flows have been 8.5-9 L/min with no alarms or power surges. Cardiology consulted in the ED, dicussed with HTS, and decision made to admit to ICU for further mgmt.       * LVAD (left ventricular assist device) present  -The patient presented with COVID and found to have an uptrending LDH with increased power. He underwent HM III upgrade on 7/13/22.  -ASA 81mg.  -INR goal 2-3. therapeutic.   -BP controlled.   -Restarted Po diuretic.   -Echo 8/30: EF 15%, LViDD 7.44, TAPSE 1.55, AV does not open. The ventricular septum is at midline.   -PT/OT/Speech. Will likely need rehab.     Procedure: Device Interrogation Including analysis of device parameters  Current Settings: Ventricular Assist Device    TXP LVAD INTERROGATIONS 8/31/2022 8/31/2022 8/30/2022 8/30/2022 8/30/2022 8/30/2022 8/30/2022   Type HeartMate3 HeartMate3  HeartMate3 HeartMate3 HeartMate3 HeartMate3 HeartMate3   Flow 5.3 5.5 5.2 5.3 5.5 5.6 5.3   Speed 6300 6300 6300 6300 6300 6300 6300   PI 3.2 3.4 3.7 3.3 2.2 2.0 3.5   Power ([place]) 5.2 5.3 5.3 5.3 5.4 5.3 5.3   LSL 5900 - - - 5900 5900 5900   Low Flow Alarm - no - - - - no   High Power Alarm - - - - - - no   Pulsatility - Intermittent pulse Intermittent pulse Intermittent pulse - - Intermittent pulse       History of ventricular tachycardia  Patient experienced an episode of VT on 6/30 and experienced an ICD shock thought to be related to hypokalemia (K of 3.1 on 6/30)  - Significant VT hx previously controlled with amio 400mg q d.   EP consulted for mexiletine dosing as pt was having significant nausea and dizziness.   -Meds changed to mexiletine 250mg TID  and amio increased to 400mg.    Acute on chronic combined systolic and diastolic congestive heart failure  -not on GDMT due to hypotension    Amiodarone-induced hyperthyroidism   -Appreciate  Endo. Continue prednisone 5 mg daily through 8/31 to prevent iatrogenic AI and then discontinue  - Continue Levothyroxine 50 mcg daily  - Repeat TSH/Free T4 level on 9/2, Endocrine will monitor peripherally until then    VT (ventricular tachycardia)  Significant VT hx previously controlled with amio 400mg q d.   EP consulted for mexiletine dosing as pt was having significant nausea and dizziness.   -Meds changed to mexiletine 250mg TID  and amio increased to 400mg.    Hypoglycemia  - due to poor PO intake    Chronic combined systolic and diastolic heart failure  -Previously on HM2 but complicated by thrombosis related to COVID  -Underwent HM III upgrade on 7/13/22  -Volume status: Euvolemic, will monitor. No diuretics for now  -Chest tube removal, bronch, and old driveline removed 7/22  -Currently being bridged with heparin, continue coumadin to goal INR of 2-3  - Daily INRs      Loki Randall NP  Heart Transplant  [facility]

## 2022-08-31 NOTE — SUBJECTIVE & OBJECTIVE
Interval History: No acute events overnight. Motivated. O2 weaned to 5L via trach collar. Ready to get to rehab.     Continuous Infusions:   dextrose 10 % in water (D10W)       Scheduled Meds:   amiodarone  400 mg Oral Daily    aspirin  81 mg Oral Daily    levalbuterol  0.63 mg Nebulization Q6H    levothyroxine  50 mcg Oral Before breakfast    magnesium oxide  400 mg Oral BID    mexiletine  250 mg Oral Q8H    mirtazapine  30 mg Oral QHS    ondansetron  4 mg Intravenous Once    pantoprazole  40 mg Oral Daily    polyethylene glycol  17 g Oral Daily    predniSONE  5 mg Oral Daily    senna-docusate 8.6-50 mg  1 tablet Oral Daily    torsemide  20 mg Oral Daily    warfarin  7.5 mg Oral Daily     PRN Meds:sodium chloride 0.9%, acetaminophen, ALPRAZolam, bisacodyL, dextrose 10 % in water (D10W), dextrose 10%, dextrose 10%, glucagon (human recombinant), glucose, guaiFENesin 100 mg/5 ml, HYDROmorphone, HYDROmorphone, hydrOXYzine HCL, meclizine, melatonin, ondansetron    Review of patient's allergies indicates:   Allergen Reactions    Lisinopril Anaphylaxis    Hydralazine analogues      Chronic constipation, impotence, dizziness     Objective:     Vital Signs (Most Recent):  Temp: 98.2 °F (36.8 °C) (08/31/22 0744)  Pulse: 80 (08/31/22 0744)  Resp: 20 (08/31/22 0744)  BP: (!) 72/0 (08/31/22 0744)  SpO2: 95 % (08/31/22 0744)   Vital Signs (24h Range):  Temp:  [98.2 °F (36.8 °C)-98.9 °F (37.2 °C)] 98.2 °F (36.8 °C)  Pulse:  [67-90] 80  Resp:  [16-20] 20  SpO2:  [95 %-100 %] 95 %  BP: (64-78)/(0) 72/0     Patient Vitals for the past 72 hrs (Last 3 readings):   Weight   08/30/22 1620 92.1 kg (203 lb)   08/30/22 0400 92.4 kg (203 lb 11.2 oz)   08/29/22 0433 93.7 kg (206 lb 9.1 oz)       Body mass index is 26.78 kg/m².      Intake/Output Summary (Last 24 hours) at 8/31/2022 0809  Last data filed at 8/31/2022 0429  Gross per 24 hour   Intake 600 ml   Output 3405 ml   Net -2805 ml            Telemetry: No significant arrhythmic events  overnight.    Physical Exam  Vitals and nursing note reviewed.   Constitutional:       General: He is not in acute distress.     Appearance: Normal appearance. He is well-developed. He is not ill-appearing.   HENT:      Head: Normocephalic and atraumatic.      Ears:      Abel exam findings: Lateralizes left.     Right Rinne: AC > BC.     Left Rinne: AC > BC.     Nose: Nose normal.      Mouth/Throat:      Mouth: Mucous membranes are moist.      Comments: Tracheostomy site is clean and dry without drainage  Eyes:      Extraocular Movements: Extraocular movements intact.      Conjunctiva/sclera: Conjunctivae normal.      Pupils: Pupils are equal, round, and reactive to light.   Cardiovascular:      Rate and Rhythm: Normal rate and regular rhythm.      Pulses: Normal pulses.      Heart sounds: Normal heart sounds.      Comments: VAD hum can be heard, no significant JVD noted on exam  Pulmonary:      Effort: Pulmonary effort is normal.      Breath sounds: Normal breath sounds. No stridor.   Abdominal:      General: Abdomen is flat. Bowel sounds are normal. There is no distension.      Palpations: Abdomen is soft.      Tenderness: There is no abdominal tenderness. There is no guarding.   Musculoskeletal:         General: Normal range of motion.      Cervical back: Normal range of motion and neck supple. No rigidity.   Lymphadenopathy:      Cervical: No cervical adenopathy.   Skin:     General: Skin is warm and dry.      Capillary Refill: Capillary refill takes less than 2 seconds.   Neurological:      General: No focal deficit present.      Mental Status: He is alert and oriented to person, place, and time.   Psychiatric:         Mood and Affect: Mood normal.         Behavior: Behavior normal.         Thought Content: Thought content normal.         Judgment: Judgment normal.       Significant Labs:  CBC:  Recent Labs   Lab 08/28/22  0506 08/28/22  0719 08/29/22  0428 08/30/22  0454   WBC 6.79  --  5.28 5.14   RBC 2.79*   --  2.58* 2.59*   HGB 7.8* 7.1* 7.1* 7.1*   HCT 26.7*  --  24.5* 23.8*     --  176 167   MCV 96  --  95 92   MCH 28.0  --  27.5 27.4   MCHC 29.2*  --  29.0* 29.8*       BNP:  Recent Labs   Lab 08/26/22  0425 08/29/22 0428 08/31/22  0520   * 171* 364*       CMP:  Recent Labs   Lab 08/24/22  0856 08/25/22  0338 08/26/22 0425 08/29/22 0428 08/30/22 0454 08/31/22  0520   GLU 68* 93   < > 72 104 72   CALCIUM 8.7 8.9   < > 9.1 9.1 9.2   ALBUMIN 2.2* 2.1*  --   --   --   --    PROT 5.9* 5.6*  --   --   --   --    * 134*   < > 133* 134* 136   K 4.5 4.7   < > 4.7 4.6 4.1   CO2 28 28   < > 29 30* 31*   CL 99 100   < > 98 99 98   BUN 17 20   < > 15 16 16   CREATININE 1.1 1.2   < > 1.0 0.9 1.0   ALKPHOS 166* 159*  --   --   --   --    ALT 56* 53*  --   --   --   --    AST 32 29  --   --   --   --    BILITOT 1.3* 1.2*  --   --   --   --     < > = values in this interval not displayed.        Coagulation:   Recent Labs   Lab 08/28/22  0506 08/29/22 0428 08/30/22 0454 08/31/22  0520   INR 1.5* 1.8* 1.9* 2.2*   APTT 40.7* 45.3* 60.6*  --        LDH:  Recent Labs   Lab 08/29/22 0428 08/30/22 0454 08/31/22  0520    285* 272*       Microbiology:  Microbiology Results (last 7 days)       ** No results found for the last 168 hours. **            I have reviewed all pertinent labs within the past 24 hours.    Estimated Creatinine Clearance: 94.3 mL/min (based on SCr of 1 mg/dL).    Diagnostic Results:  I have reviewed all pertinent imaging results/findings within the past 24 hours.

## 2022-09-01 VITALS
HEART RATE: 86 BPM | RESPIRATION RATE: 20 BRPM | OXYGEN SATURATION: 94 % | SYSTOLIC BLOOD PRESSURE: 66 MMHG | TEMPERATURE: 98 F | BODY MASS INDEX: 26.9 KG/M2 | WEIGHT: 203 LBS | HEIGHT: 73 IN

## 2022-09-01 LAB
ANION GAP SERPL CALC-SCNC: 7 MMOL/L (ref 8–16)
BUN SERPL-MCNC: 17 MG/DL (ref 6–20)
CALCIUM SERPL-MCNC: 8.9 MG/DL (ref 8.7–10.5)
CHLORIDE SERPL-SCNC: 99 MMOL/L (ref 95–110)
CO2 SERPL-SCNC: 31 MMOL/L (ref 23–29)
CREAT SERPL-MCNC: 1 MG/DL (ref 0.5–1.4)
EST. GFR  (NO RACE VARIABLE): >60 ML/MIN/1.73 M^2
GLUCOSE SERPL-MCNC: 69 MG/DL (ref 70–110)
INR PPP: 2.5 (ref 0.8–1.2)
LDH SERPL L TO P-CCNC: 235 U/L (ref 110–260)
MAGNESIUM SERPL-MCNC: 2.1 MG/DL (ref 1.6–2.6)
PHOSPHATE SERPL-MCNC: 3.6 MG/DL (ref 2.7–4.5)
POCT GLUCOSE: 108 MG/DL (ref 70–110)
POTASSIUM SERPL-SCNC: 4.2 MMOL/L (ref 3.5–5.1)
PROTHROMBIN TIME: 24.5 SEC (ref 9–12.5)
SODIUM SERPL-SCNC: 137 MMOL/L (ref 136–145)

## 2022-09-01 PROCEDURE — 83735 ASSAY OF MAGNESIUM: CPT | Performed by: STUDENT IN AN ORGANIZED HEALTH CARE EDUCATION/TRAINING PROGRAM

## 2022-09-01 PROCEDURE — 99233 PR SUBSEQUENT HOSPITAL CARE,LEVL III: ICD-10-PCS | Mod: ,,, | Performed by: NURSE PRACTITIONER

## 2022-09-01 PROCEDURE — 27000221 HC OXYGEN, UP TO 24 HOURS

## 2022-09-01 PROCEDURE — 25000003 PHARM REV CODE 250: Performed by: NURSE PRACTITIONER

## 2022-09-01 PROCEDURE — 85610 PROTHROMBIN TIME: CPT | Performed by: STUDENT IN AN ORGANIZED HEALTH CARE EDUCATION/TRAINING PROGRAM

## 2022-09-01 PROCEDURE — 25000003 PHARM REV CODE 250: Performed by: STUDENT IN AN ORGANIZED HEALTH CARE EDUCATION/TRAINING PROGRAM

## 2022-09-01 PROCEDURE — 94761 N-INVAS EAR/PLS OXIMETRY MLT: CPT

## 2022-09-01 PROCEDURE — 93750 INTERROGATION VAD IN PERSON: CPT | Mod: ,,, | Performed by: INTERNAL MEDICINE

## 2022-09-01 PROCEDURE — 25000003 PHARM REV CODE 250: Performed by: INTERNAL MEDICINE

## 2022-09-01 PROCEDURE — 99900035 HC TECH TIME PER 15 MIN (STAT)

## 2022-09-01 PROCEDURE — 84100 ASSAY OF PHOSPHORUS: CPT | Performed by: STUDENT IN AN ORGANIZED HEALTH CARE EDUCATION/TRAINING PROGRAM

## 2022-09-01 PROCEDURE — 27000685 HC LVAD KIT (30 DAY SUPPLY)

## 2022-09-01 PROCEDURE — 99900026 HC AIRWAY MAINTENANCE (STAT)

## 2022-09-01 PROCEDURE — 93750 PR INTERROGATE VENT ASSIST DEV, IN PERSON, W PHYSICIAN ANALYSIS: ICD-10-PCS | Mod: ,,, | Performed by: INTERNAL MEDICINE

## 2022-09-01 PROCEDURE — 99233 SBSQ HOSP IP/OBS HIGH 50: CPT | Mod: ,,, | Performed by: NURSE PRACTITIONER

## 2022-09-01 PROCEDURE — 97535 SELF CARE MNGMENT TRAINING: CPT

## 2022-09-01 PROCEDURE — 27200966 HC CLOSED SUCTION SYSTEM

## 2022-09-01 PROCEDURE — 83615 LACTATE (LD) (LDH) ENZYME: CPT | Performed by: INTERNAL MEDICINE

## 2022-09-01 PROCEDURE — 80048 BASIC METABOLIC PNL TOTAL CA: CPT | Performed by: INTERNAL MEDICINE

## 2022-09-01 PROCEDURE — 27000248 HC VAD-ADDITIONAL DAY

## 2022-09-01 PROCEDURE — 97530 THERAPEUTIC ACTIVITIES: CPT

## 2022-09-01 RX ORDER — POLYETHYLENE GLYCOL 3350 17 G/17G
17 POWDER, FOR SOLUTION ORAL DAILY
Refills: 0
Start: 2022-09-02 | End: 2022-11-13 | Stop reason: ALTCHOICE

## 2022-09-01 RX ORDER — LANOLIN ALCOHOL/MO/W.PET/CERES
400 CREAM (GRAM) TOPICAL 2 TIMES DAILY
Refills: 0
Start: 2022-09-01 | End: 2022-09-22 | Stop reason: SDUPTHER

## 2022-09-01 RX ORDER — MIRTAZAPINE 30 MG/1
30 TABLET, FILM COATED ORAL NIGHTLY
Qty: 30 TABLET | Refills: 11
Start: 2022-09-01 | End: 2022-09-22 | Stop reason: SDUPTHER

## 2022-09-01 RX ORDER — MEXILETINE HYDROCHLORIDE 250 MG/1
250 CAPSULE ORAL EVERY 8 HOURS
Qty: 90 CAPSULE | Refills: 3 | Status: SHIPPED | OUTPATIENT
Start: 2022-09-01 | End: 2022-12-15 | Stop reason: SDUPTHER

## 2022-09-01 RX ORDER — OMEGA-3-ACID ETHYL ESTERS 1 G/1
2 CAPSULE, LIQUID FILLED ORAL 2 TIMES DAILY
Qty: 360 CAPSULE | Refills: 3
Start: 2022-09-01 | End: 2022-09-22 | Stop reason: SDUPTHER

## 2022-09-01 RX ORDER — LEVOTHYROXINE SODIUM 50 UG/1
50 TABLET ORAL
Qty: 30 TABLET | Refills: 11
Start: 2022-09-02 | End: 2022-09-22 | Stop reason: SDUPTHER

## 2022-09-01 RX ORDER — WARFARIN SODIUM 5 MG/1
7.5 TABLET ORAL DAILY
Qty: 135 TABLET | Refills: 3
Start: 2022-09-01 | End: 2022-09-22 | Stop reason: SDUPTHER

## 2022-09-01 RX ADMIN — TORSEMIDE 20 MG: 20 TABLET ORAL at 09:09

## 2022-09-01 RX ADMIN — LEVOTHYROXINE SODIUM 50 MCG: 50 TABLET ORAL at 06:09

## 2022-09-01 RX ADMIN — PANTOPRAZOLE SODIUM 40 MG: 40 TABLET, DELAYED RELEASE ORAL at 09:09

## 2022-09-01 RX ADMIN — SENNOSIDES AND DOCUSATE SODIUM 1 TABLET: 50; 8.6 TABLET ORAL at 09:09

## 2022-09-01 RX ADMIN — MEXILETINE HYDROCHLORIDE 250 MG: 250 CAPSULE ORAL at 03:09

## 2022-09-01 RX ADMIN — ASPIRIN 81 MG CHEWABLE TABLET 81 MG: 81 TABLET CHEWABLE at 09:09

## 2022-09-01 RX ADMIN — MEXILETINE HYDROCHLORIDE 250 MG: 250 CAPSULE ORAL at 06:09

## 2022-09-01 RX ADMIN — AMIODARONE HYDROCHLORIDE 400 MG: 200 TABLET ORAL at 09:09

## 2022-09-01 RX ADMIN — Medication 400 MG: at 09:09

## 2022-09-01 NOTE — RESPIRATORY THERAPY
RAPID RESPONSE RESPIRATORY THERAPY PROACTIVE NOTE           Time of visit: 726     Code Status: Full Code   : 1965  Bed: 311/311 A:   MRN: [id number]  Time spent at the bedside: < 15 min    SITUATION    Evaluated patient for: LDA Check     BACKGROUND    Patient has a past medical history of A-fib, Anticoagulant long-term use, Atrial flutter, CHF (congestive heart failure), Class 1 obesity due to excess calories with serious comorbidity and body mass index (BMI) of 31.0 to 31.9 in adult, Class 1 obesity due to excess calories with serious comorbidity and body mass index (BMI) of 32.0 to 32.9 in adult, Dilated cardiomyopathy, Disorder of kidney and ureter, Encounter for blood transfusion, Essential hypertension, Gout, HTN (hypertension), psychiatric care, ICD (implantable cardioverter-defibrillator) infection, Psychiatric problem, Thyroid disease, and Ventricular tachycardia (paroxysmal).  Clinically Significant Surgical Hx: tracheostomy    24 Hours Vitals Range:  Temp:  [97.5 °F (36.4 °C)-98.7 °F (37.1 °C)]   Pulse:  [74-90]   Resp:  [14-20]   BP: ()/(0-82)   SpO2:  [93 %-100 %]     Labs:    Recent Labs     22  0454 22  0520 22  0456   * 136 137   K 4.6 4.1 4.2   CL 99 98 99   CO2 30* 31* 31*   CREATININE 0.9 1.0 1.0    72 69*   PHOS 3.3 3.5 3.6   MG 2.3 2.1 2.1        No results for input(s): PH, PCO2, PO2, HCO3, POCSATURATED, BE in the last 72 hours.    ASSESSMENT/INTERVENTIONS  Pt resting and has no resp concerns at this time  all trach supplies are at bedside      Last VS   Temp: 98.1 °F (36.7 °C) (445)  Pulse: 81 (656)  Resp: 14 (445)  BP: 80/0 (445)  SpO2: 100 % (445)      Extra trachs at bedside: 6.0 and 8.0 shiley cuffed  Level of Consciousness: Level of Consciousness (AVPU): alert  Respiratory Effort: Respiratory Effort: Unlabored Expansion/Accessory Muscle Usage: Expansion/Accessory Muscles/Retractions: no retractions, no use of  accessory muscles, expansion symmetric  All Lung Field Breath Sounds: All Lung Fields Breath Sounds: Anterior:, Lateral:, diminished  SHERWIN Breath Sounds: Anterior:, Lateral:, Posterior:, wheezes, expiratory  LLL Breath Sounds: Anterior:, Posterior:, Lateral:, coarse  RUL Breath Sounds: Anterior:, Posterior:, Lateral:, scratchy  RML Breath Sounds: Anterior:, Posterior:, Lateral:, coarse  RLL Breath Sounds: Anterior:, Posterior:, Lateral:, coarse  O2 Device/Concentration: TC 5L 28%  Surgical airway: Yes, Type: Shiley Size: 8, cuffed  Ambu at bedside: Ambu bag with the patient?: Yes, Adult Ambu     Active Orders   Respiratory Care    Chest physiotherapy Q6H PRN     Frequency: Q6H PRN     Number of Occurrences: Until Specified     Order Questions:      Indications: COPIOUS SPUTUM PRODUCTION    Inhalation Treatment Q6H     Frequency: Q6H     Number of Occurrences: Until Specified    Oxygen Continuous     Frequency: Continuous     Number of Occurrences: Until Specified     Order Questions:      Device type: Low flow      Device: Trach Collar      FiO2%: Wean as tolerated      Titrate O2 per Oxygen Titration Protocol: Yes      To maintain SpO2 goal of: >= 90%      Notify MD of: Inability to achieve desired SpO2; Sudden change in patient status and requires 20% increase in FiO2; Patient requires >60% FiO2    Pulse Oximetry Q4H     Frequency: Q4H     Number of Occurrences: Until Specified    Respiratory care evaluation only Once     Frequency: Once     Number of Occurrences: 1 Occurrences     Order Comments: Consult for tracheostomy exchange/downsize      RESPIRATORY COMMUNICATION     Frequency: Once     Number of Occurrences: 1 Occurrences     Order Comments: Rehab requires that he be on 5L or less O2. Continue to wean aggressively      Routine tracheostomy care     Frequency: BID     Number of Occurrences: Until Specified       RECOMMENDATIONS    We recommend: RRT Recs: Continue POC per primary  team.      FOLLOW-UP    Please call back the Rapid Response RT, Laura Lyn, RRT at x [phone] for any questions or concerns.

## 2022-09-01 NOTE — NURSING
Patient seen prior to discharge to rehab. Pt is going with PM and [facility] from hospital [facility], nurse Zach will inventory on discharge. I also ordered patient a 30 day supply of kits for rehab use.

## 2022-09-01 NOTE — PLAN OF CARE
[facility]   Heart Transplant/VAD Clinic   [address] [place] [address]   [phone] after hours          [phone] fax      [facility]    FACILITY TRANSFER ORDERS      Patient Name: Tim Richards  YOB: 1965    PCP: Deyanira Booth MD   PCP Address: [address] / [place] [address]  PCP Phone Number: [phone]  PCP Fax: [phone]    Encounter Date: 09/01/2022    Admit to: Rehab    Vital Signs:  Routine    Diagnoses:   Active Hospital Problems    Diagnosis  POA    *LVAD (left ventricular assist device) present [Z95.811]  Not Applicable     Priority: 1 - High    Impaired mobility [Z74.09]  Unknown    Acute on chronic combined systolic and diastolic heart failure [I50.43]  Unknown    Stage 3b chronic kidney disease [N18.32]  Unknown    Hypertensive cardiovascular-renal disease, stage 1-4 or unspecified chronic kidney disease, with heart failure [I13.0]  Unknown    High risk medications (not anticoagulants) long-term use [Z79.899]  Not Applicable    Dilated cardiomyopathy [I42.0]  Unknown    Anticoagulation monitoring, INR range 2-3 [Z79.01]  Not Applicable    Hyponatremia [E87.1]  Unknown     -Clinically appears to be slightly hypervolemic  -hyponatremia of 129, will monitor I/Os closely.  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]  Yes    VT (ventricular tachycardia) [I47.2]  No             Hypoglycemia [E16.2]  No    Palliative care encounter [Z51.5]  Not Applicable    Chronic combined systolic and diastolic heart failure [I50.42]  Yes     Chronic      Resolved Hospital Problems    Diagnosis  Date Resolved POA    Essential hypertension [I10] 08/28/2022 Unknown    LVAD (left ventricular assist device) present [Z95.811] 08/29/2022 Not Applicable    Constipation [K59.00] 08/29/2022 No    Dizziness [R42] 08/29/2022 No    Hyperkalemia [E87.5] 08/27/2022 Unknown    Atrial flutter [I48.92] 08/27/2022 No    Acute hypercapnic respiratory failure [J96.02] 08/27/2022 Unknown    Acute hypoxemic respiratory failure [J96.01] 08/27/2022 Unknown    A-fib [I48.91] 08/27/2022 Unknown    Shock [R57.9] 08/27/2022 Clinically Undetermined    Hypernatremia [E87.0] 08/27/2022 No    Personal history of extracorporeal membrane oxygenation (ECMO) [Z92.81] 08/27/2022 Not Applicable    Encounter for weaning from ventilator [Z99.11] 08/27/2022 Not Applicable    Moderate protein-calorie malnutrition [E44.0] 08/27/2022 No     Nutrition Problem:  Moderate Protein-Calorie Malnutrition  Malnutrition in the context ofAcute Illness/Injury      Related to (etiology):  Inadequate energy intake, NPO, intubation      Signs and Symptoms (as evidenced by):  Energy Intake: <50% of estimated energy requirement x 5 days  Weight Loss: suspect weight loss masked by edema   Fluid Accumulation: moderate (3+ edema)     Interventions(treatment strategy):  Collaboration of nutrition care with other providers  Enteral nutrition      Nutrition Diagnosis Status:  New         Metabolic alkalosis [E87.3] 08/27/2022 No    WAGNER (acute kidney injury) [N17.9] 08/27/2022 Unknown    COVID-19 virus infection [U07.1] 08/26/2022 Unknown    Dilated cardiomyopathy [I42.0] 08/23/2022 Yes    Essential hypertension [I10] 08/23/2022 Yes    Elevated serum lactate dehydrogenase (LDH) [R74.02] 08/23/2022 Yes    Heart replaced by heart assist device [Z95.811] 08/26/2022 Not Applicable    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, 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] 08/29/2022 Yes    Shortness of breath [R06.02] 08/25/2022 Yes    Class 1 obesity due to excess calories with serious comorbidity and body mass  index (BMI) of 32.0 to 32.9 in adult [E66.09, Z68.32] 08/28/2022 Not Applicable    History of bleeding ulcers [Z87.11] 08/25/2022 Not Applicable    Leukocytosis [D72.829] 08/25/2022 Unknown    Anticoagulation monitoring, INR range 2-3 [Z79.01] 08/26/2022 Not Applicable    LVAD (left ventricular assist device) present [Z95.811] 08/23/2022 Not Applicable    Hypertensive cardiovascular-renal disease, stage 1-4 or unspecified chronic kidney disease, with heart failure [I13.0] 08/26/2022 Yes    Acute decompensated heart failure [I50.9] 08/26/2022 Yes    Stage 3b chronic kidney disease [N18.32] 08/26/2022 Yes    High risk medications (amiodarone) long-term use [Z79.899] 08/26/2022 Not Applicable    Acute on chronic systolic congestive heart failure [I50.23] 08/26/2022 Yes       Allergies:  Review of patient's allergies indicates:   Allergen Reactions    Lisinopril Anaphylaxis    Hydralazine analogues      Chronic constipation, impotence, dizziness       Diet: regular diet    Activities: Activity as tolerated    Goals of Care Treatment Preferences:  Code Status: Full Code    Health care agent: Wife is CHAVEZ Mendoza  [facility] care agent number: No value filed.               Nursing: Per protocol.  *LVAD driveline exit site dressing change is to be completed per LVAD patient/caregiver only**.  Notify MD if:  SBP > 120 or < 80;   MAP > 80 or < 65;   HR > 120 or < 60;   Temp > 101;   Weight gain >3lbs in 1 day or 5lbs in 1 week.    Labs: CBC, CMP, and INR  Twice weekly      CONSULTS:    Physical Therapy to evaluate and treat. , Occupational Therapy to evaluate and treat., and Speech Therapy to evaluate and treat for Language and Swallowing.    WOUND CARE ORDERS  Yes: Wound Vac:   Location:  R groin           Dressing changes every Monday, Wednesday and Friday.        ET Consult    Medications: Review discharge medications with patient and family and provide education.      Current Discharge Medication List        CONTINUE these  medications which have CHANGED    Details   ALPRAZolam (XANAX) 1 MG tablet TAKE 1 TABLET BY MOUTH TWICE A DAILY AS NEEDED FOR ANXIETY.  Qty: 30 tablet, Refills: 0    Comments: This request is for a new prescription for a controlled substance as required by Federal/State law.      amLODIPine (NORVASC) 10 MG tablet TAKE 1 TABLET BY MOUTH EVERY DAY  Qty: 90 tablet, Refills: 3      torsemide (DEMADEX) 20 MG Tab TAKE 2 TABLETS BY MOUTH ONCE DAILY  Qty: 180 tablet, Refills: 3    Associated Diagnoses: Chronic combined systolic and diastolic heart failure; LVAD (left ventricular assist device) present      zolpidem (AMBIEN CR) 12.5 MG CR tablet TAKE 1 TABLET (12.5 MG TOTAL) BY MOUTH NIGHTLY AS NEEDED FOR INSOMNIA.  Qty: 15 tablet, Refills: 0    Comments: This request is for a new prescription for a controlled substance as required by Federal/State law.           CONTINUE these medications which have NOT CHANGED    Details   allopurinoL (ZYLOPRIM) 100 MG tablet TAKE 1 TABLET (100 MG) BY MOUTH NIGHTLY.  Qty: 90 tablet, Refills: 3    Associated Diagnoses: Hyperuricemia      amiodarone (PACERONE) 200 MG Tab Take 2 tablets (400 mg total) by mouth once daily.  Qty: 180 tablet, Refills: 3      aspirin (ECOTRIN) 81 MG EC tablet Take 1 tablet (81 mg total) by mouth once daily.  Qty: 30 tablet, Refills: 11      busPIRone (BUSPAR) 5 MG Tab Take 1 tablet (5 mg total) by mouth 3 (three) times daily.  Qty: 90 tablet, Refills: 1      levothyroxine (SYNTHROID) 112 MCG tablet Take 1 tablet (112 mcg total) by mouth before breakfast.  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  Qty: 270 capsule, Refills: 3      pantoprazole (PROTONIX) 40 MG tablet TAKE 1 TABLET (40 MG TOTAL) BY MOUTH DAILY WITH LUNCH.  Qty: 90 tablet, Refills: 3      warfarin (COUMADIN) 5 MG tablet TAKE 7.5 MG ORALLY DAILY EVERY SUNDAY  AND THURSDAY, TAKE 5 MG ORALLY DAILY ON OTHER DAYS  Qty: 135 tablet, Refills: 3    Associated Diagnoses: LVAD (left ventricular assist device) present; Anticoagulation monitoring, INR range 2-3           STOP taking these medications       carvediloL (COREG) 3.125 MG tablet Comments:   Reason for Stopping:         ferrous gluconate (FERGON) 324 MG tablet Comments:   Reason for Stopping:         potassium chloride SA (K-DUR,KLOR-CON) 20 MEQ tablet Comments:   Reason for Stopping:         potassium chloride SA (K-DUR,KLOR-CON) 20 MEQ tablet Comments:   Reason for Stopping:         sildenafiL (VIAGRA) 100 MG tablet Comments:   Reason for Stopping:         vitamin D (VITAMIN D3) 1000 units Tab Comments:   Reason for Stopping:              Immunizations Administered as of 9/1/2022       Name Date Dose VIS Date Route Exp Date    COVID-19, MRNA, LN-S, PF (Moderna) 4/9/2021 -- -- -- --    Site: Left arm     Lot: [id number]     COVID-19, MRNA, LN-S, PF (Moderna) 3/12/2021 -- -- -- --    Lot: [id number]     COVID-19, MRNA, LN-S, PF (Pfizer) (Purple Cap) 12/4/2021 0.3 mL -- Intramuscular --    Site: Left deltoid     : Pf[employer]     Lot: [id number]             VACCINATED    Some patients may experience side effects after vaccination.  These may include fever, headache, muscle or joint aches.  Most symptoms resolve with 24-48 hours and do not require urgent medical evaluation unless they persist for more than 72 hours or symptoms are concerning for an unrelated medical condition.          _________________________________  Loki Randall NP  09/01/2022  *

## 2022-09-01 NOTE — PROGRESS NOTES
"   09/01/22 1300        VAD 07/13/22 1300 Left ventricular assist device HeartMate 3   Placement Date/Time: 07/13/22 1300   Present Prior to Hospital Arrival?: No  Inserted by: MD  VAD Type: Left ventricular assist device  VAD Brand: HeartMate 3   Site Location Abdomen left   Site Assessment Intact;Sutures intact;Draining   Driveline Exit Site 2   Driveline Assessment Free of Kinks;Intact;Modular cable Clean and Locked   Dressing Status Clean;Dry;Intact   Dressing Intervention Sterile dressing change   Performed By RN   Dressing Change Schedule Daily   Dressing Change Due 09/02/22   LVAD dressing change completed using sterile technique with soap and water. DLES is a "2" with minimal drainage noted on the drain sponge. Tolerated without any complication. Sutures remain intact, no redness, or tenderness noted.   "

## 2022-09-01 NOTE — NURSING
Wound Vac removed before leaving [facility] to go to [facility] as told by Rosalina Malhotra (). Wet to dry dressing covering site as instructed by wound care. Receiving nurse at [facility] instructed for new wound vac to be applied on arrival to facility.

## 2022-09-01 NOTE — PT/OT/SLP PROGRESS
Physical Therapy Treatment    Patient Name:  Tim Richards   MRN:  [id number]  Admit Date: 2022  Admitting Diagnosis:  LVAD (left ventricular assist device) present   Length of Stay: 66 days  Recent Surgery: Procedure(s) (LRB):  CREATION, TRACHEOSTOMY (N/A)  INSERTION, CENTRAL VENOUS ACCESS DEVICE 28 Days Post-Op    Recommendations:     Discharge Recommendations:  rehabilitation facility   Discharge Equipment Recommendations: walker, rolling   Barriers to discharge: None    Plan:     During this hospitalization, patient to be seen 5 x/week to address the listed problems via gait training, therapeutic activities, therapeutic exercises, neuromuscular re-education  Plan of Care Expires:  22  Plan of Care Reviewed with: patient    Assessment:     Tim Richards is a 56 y.o. male admitted with a medical diagnosis of LVAD (left ventricular assist device) present. Pt found alert. Pt perseverated on stomachache, lack of sleep, and poor food choices throughout the session. Pt sat EOB and switched over to batter power with OT. Pt demo'd minimal verbal communication and spontaneous prolonged pauses of inactivity (behavorial).  Pt attempted one stand and sat back down on bed before he completed the transfer. After sit to stand transfer, pt reported that he did not want to do this today and declined further therapy.     Problem List: weakness, impaired endurance, impaired functional mobility, gait instability, impaired balance, decreased safety awareness, pain, impaired cardiopulmonary response to activity.  Rehab Prognosis: Good     GOALS:   Multidisciplinary Problems       Physical Therapy Goals          Problem: Physical Therapy    Goal Priority Disciplines Outcome Goal Variances Interventions   Physical Therapy Goal     PT, PT/OT Ongoing, Progressing     Description: Goals to be met by: 2022    Patient will increase functional independence with mobility by performin. Supine to sit with MInimal  Assistance -MET 8/29/2022  2. Sit to stand transfer with Contact Guard Assistance with AD if needed -not met  3. Gait  x 150 feet with Contact Guard Assistance with assistive device -not met  4. Ascend/descend 8 stair with right Handrails Contact Guard Assistance -not met  5. Sitting at edge of bed x15 minutes with Contact Guard Assistance to improve tolerance to activities. -met 8/29/2022  6. Lower extremity exercise program x15 reps with assistance as needed -not met  7. Supine to sit with independence  8.Pt will be able to stand with CGA to perform tasks and improve standing balance.                 Multidisciplinary Problems (Resolved)          Problem: Physical Therapy    Goal Priority Disciplines Outcome Goal Variances Interventions   Physical Therapy Goal   (Resolved)     PT, PT/OT Met     Description: The patient is near his functional baseline, he ambulated within the room with no AD and independence. Unable to do stair training, assess gait endurance due to COVID restrictions. He is safe to return home with no therapy needs. He is in agreement with PT discharge, he states he is confident he can ascend/descend his stairs.           Problem: Physical Therapy    Goal Priority Disciplines Outcome Goal Variances Interventions   Physical Therapy Goal   (Resolved)     PT, PT/OT Met                         Subjective   Communicated with RN prior to session.  Patient found HOB elevated upon PT entry to room, agreeable to evaluation. Tim Richards's alone during session.    Chief Complaint: being in the hospital   Patient/Family Comments/goals: none reported   Pain/Comfort:  Pain Rating 1: 6/10  Location 1:  (abdomen)  Pain Addressed 1: Reposition, Distraction  Pain Rating Post-Intervention 1: 6/10    Objective:   Patient found with: telemetry, oxygen, Tracheostomy, wound vac, PICC line   General Precautions: Standard, Cardiac fall, LVAD   Orthopedic Precautions:N/A   Braces: N/A   Oxygen Device: Trach Collar  "  Vitals: BP (!) 66/0 (BP Location: Right arm, Patient Position: Lying)   Pulse 80   Temp 97.8 °F (36.6 °C) (Oral)   Resp 20   Ht 6' 1" (1.854 m)   Wt 92.1 kg (203 lb)   SpO2 95%   BMI 26.78 kg/m²     Outcome Measures:  AM-PAC 6 CLICK MOBILITY  Turning over in bed (including adjusting bedclothes, sheets and blankets)?: 3  Sitting down on and standing up from a chair with arms (e.g., wheelchair, bedside commode, etc.): 2  Moving from lying on back to sitting on the side of the bed?: 3  Moving to and from a bed to a chair (including a wheelchair)?: 2  Need to walk in hospital room?: 2  Climbing 3-5 steps with a railing?: 1  Basic Mobility Total Score: 13     Functional Mobility:  Additional staff present: OT - due to pt requiring 2 skilled therapists to safely perform functional mobility and to accommodate pt's activity tolerance  Bed Mobility:   Supine to Sit: stand by assistance; HOB elevated  Scooting anteriorly to EOB to have both feet planted on floor: stand by assistance  Sit to Supine: stand by assistance; HOB flat    Sitting Balance at Edge of Bed:  Assistance Level Required: Supervision  Time: 10 minutes   Comments:   Worked on activity tolerance sitting EOB and worked on tolerance to positional change     Transfers:   Sit <> Stand Transfer: moderate assistance with rolling walker from EOB  Pt only obtained 75% upright posture and decided to return to sitting       Gait:  Not performed 2nd to pt decline further therapy after sit to stand transfer     Therapeutic Activities, Exercises, and Education:   Educated pt on PT role/POC  Educated pt on importance of OOB activity and daily ambulation   Pt verbalized understanding     Encouragement and education provided for pt to work hard to progress with mobility. Pt continued to decline.      Patient left HOB elevated with all lines intact, call button in reach, and RN notified..    Time Tracking:     PT Received On: 09/01/22  PT Start Time: 0840     PT Stop " Time: 0904  PT Total Time (min): 24 min       Billable Minutes:   Therapeutic Activity 23    Treatment Type: Treatment  PT/PTA: PT

## 2022-09-01 NOTE — DISCHARGE SUMMARY
[facility] - Cardiology Stepdown  Heart Transplant  Discharge Summary      Patient Name: Tim Richards  MRN: [id number]  Admission Date: 6/27/2022  Hospital Length of Stay: 66 days  Discharge Date and Time: 09/01/2022 4:04 PM  Attending Physician: Amy Rhodes MD   Discharging Provider: Loki Randall NP  Primary Care Provider: Deyanira Booth MD     HPI:56-yo M with hx of Stage D HFrEF (NICMP, EF 10%, previously NYHA II), s/p  HM2 implanted in march/2018, SC ICD, VT on amiodarone and mexiletine and amiodarone induced hypothyroidism admitted with shock/COVID.      Procedure(s) (LRB):  CREATION, TRACHEOSTOMY (N/A)  INSERTION, CENTRAL VENOUS ACCESS DEVICE     Hospital Course: He was subsequently found to have pump malfunction and went for pump exchange to [facility] on 7/13/22. Post op course significant for prolonged ICU course requiring trach placement. He was weaned from vent and transferred to floor. Endo was consulted for amio induced hyperthyroidism and adjusted his medications accordingly. Mexiletine dose was adjusted due to nausea. He continued to work with PT/OT and was eventually DCd to rehab. He will f/u in [facility] clinic in 1 week with labs prior.     Consults (From admission, onward)          Status Ordering Provider     Inpatient consult to Physical Medicine Rehab  Once        Provider:  (Not yet assigned)    Completed LINDA DANIELS     Inpatient consult to Electrophysiology  Once        Provider:  (Not yet assigned)    Completed AROLDO MILES     Inpatient consult to Midline team  Once        Provider:  (Not yet assigned)    Completed LINDA DANIELS     Inpatient consult to ENT  Once        Provider:  (Not yet assigned)    Completed AMBER, MIKEL     Inpatient consult to Registered Dietitian/Nutritionist  Once        Provider:  (Not yet assigned)    Completed AMBER, MIKEL     Inpatient consult to Midline team  Once        Provider:  (Not yet assigned)    Completed AMY RHODES      Inpatient consult to Registered Dietitian/Nutritionist  Once        Provider:  (Not yet assigned)    Completed UPADHAYA, MAU     Inpatient consult to Registered Dietitian/Nutritionist  Once        Provider:  (Not yet assigned)    Completed EUGENIA JENKINS     Inpatient consult to Midline team  Once        Provider:  (Not yet assigned)    Completed DELMAR HUTTON     Inpatient consult to Midline team  Once        Provider:  (Not yet assigned)    Completed POPPY DAVIS     Inpatient consult to Psychiatry  Once        Provider:  (Not yet assigned)    Completed ANUP MCGILL     Inpatient consult to General Surgery  Once        Provider:  (Not yet assigned)    Completed UPADHAYA, MAU     Inpatient consult to Midline team  Once        Provider:  (Not yet assigned)    Completed POPPY DAVIS     Inpatient consult to Infectious Diseases  Once        Provider:  (Not yet assigned)    Completed UPADHAYA, MAU     Inpatient consult to Infectious Diseases  Once        Provider:  (Not yet assigned)    Completed UPADHAYA, MAU     Inpatient consult to Electrophysiology  Once        Provider:  (Not yet assigned)    Completed GERALDO DEWEY     Inpatient consult to Endocrinology  Once        Provider:  (Not yet assigned)    Completed CHRISTIAN VINSON     Inpatient consult to General Surgery  Once        Provider:  (Not yet assigned)    Completed CHRISTIAN VINSON     Inpatient consult to Midline team  Once        Provider:  (Not yet assigned)    Completed NICO WESTON     Inpatient consult to Electrophysiology  Once        Provider:  (Not yet assigned)    Completed LUIS F GREENE     Inpatient consult to Infectious Diseases  Once        Provider:  (Not yet assigned)    Completed LUIS F GREENE     Inpatient consult to Registered Dietitian/Nutritionist  Once        Provider:  (Not yet assigned)    Completed CHRISTIAN VINSON     Inpatient consult to Nephrology  Once        Provider:   (Not yet assigned)    Completed CHRISTAIN VINSON     Inpatient consult to Psychiatry  Once        Provider:  (Not yet assigned)    Completed AMBER ALICIA     Inpatient consult to Palliative Care  Once        Provider:  (Not yet assigned)    Completed POPPY DAVIS     Inpatient consult to Midline team  Once        Provider:  (Not yet assigned)    Completed DEEPA WYATT JR     Inpatient consult to Infectious Diseases  Once        Provider:  (Not yet assigned)    Completed FESTUS PADRON            Pending Diagnostic Studies:       Procedure Component Value Units Date/Time    APTT [[id number]] Collected: 08/19/22 1029    Order Status: Sent Lab Status: In process Updated: 08/19/22 1029    Specimen: Blood     Narrative:      Collection has been rescheduled by LJL at 08/19/2022 05:24 Reason:   Nurse Elizabeth said the patient didnt need Pt/suzi test and that she will   cancel it  Collection has been rescheduled by FB at 08/19/2022 10:29 Reason:   Nurse Draw notfitied nurse Lindy    APTT [[id number]] Collected: 08/18/22 0841    Order Status: Sent Lab Status: In process Updated: 08/18/22 0842    Specimen: Blood     Narrative:      Collection has been rescheduled by RFW at 08/18/2022 03:19 Reason:   Unable to collect nurse Fanny    APTT [[id number]] Collected: 08/18/22 0841    Order Status: Sent Lab Status: In process Updated: 08/18/22 0842    Specimen: Blood     Narrative:      Collection has been rescheduled by RFW at 08/18/2022 03:19 Reason:   Unable to collect nurse Fanny    APTT [[id number]] Collected: 07/20/22 0038    Order Status: Sent Lab Status: In process Updated: 07/20/22 0039    Specimen: Blood     Anti-Xa Heparin Monitoring [[id number]] Collected: 08/04/22 1943    Order Status: Sent Lab Status: In process Updated: 08/04/22 1944    Specimen: Blood     Anti-Xa Heparin Monitoring [[id number]] Collected: 08/03/22 1910    Order Status: Sent Lab Status: In process Updated: 08/03/22 1912    Specimen:  Blood     Basic metabolic panel [[id number]] Collected: 06/30/22 1415    Order Status: Sent Lab Status: In process Updated: 06/30/22 1415    Specimen: Blood     CBC auto differential [[id number]] Collected: 08/19/22 0523    Order Status: Sent Lab Status: In process Updated: 08/19/22 0523    Specimen: Blood     CBC auto differential [[id number]] Collected: 08/18/22 0841    Order Status: Sent Lab Status: In process Updated: 08/18/22 0842    Specimen: Blood     Narrative:      Collection has been rescheduled by RFW at 08/18/2022 03:19 Reason:   Unable to collect nurse Fanny    CBC auto differential [[id number]] Collected: 08/12/22 1949    Order Status: Sent Lab Status: In process Updated: 08/12/22 1950    Specimen: Blood     CBC auto differential [[id number]] Collected: 07/24/22 1516    Order Status: Sent Lab Status: In process Updated: 07/24/22 1518    Specimen: Blood     CBC auto differential [[id number]] Collected: 07/24/22 1516    Order Status: Sent Lab Status: In process Updated: 07/24/22 1518    Specimen: Blood     Fibrinogen [[id number]] Collected: 08/19/22 1029    Order Status: Sent Lab Status: In process Updated: 08/19/22 1029    Specimen: Blood     Narrative:      Collection has been rescheduled by LJL at 08/19/2022 05:24 Reason:   Nurse Johnson said the patient didnt need Pt/suzi test and that she will   cancel it  Collection has been rescheduled by FB at 08/19/2022 10:29 Reason:   Nurse Draw notfitied nurse Lindy    Fibrinogen [[id number]] Collected: 08/18/22 0841    Order Status: Sent Lab Status: In process Updated: 08/18/22 0842    Specimen: Blood     Narrative:      Collection has been rescheduled by RFW at 08/18/2022 03:19 Reason:   Unable to collect nurse Fanny    Protime-INR [[id number]] Collected: 08/19/22 1029    Order Status: Sent Lab Status: In process Updated: 08/19/22 1029    Specimen: Blood     Narrative:      Collection has been rescheduled by LJL at 08/19/2022 05:24 Reason:   Nurse Elizabeth said the  patient didnt need Pt/suzi test and that she will   cancel it  Collection has been rescheduled by FB at 08/19/2022 10:29 Reason:   Nurse Draw notfitied nurse Lindy    Urinalysis [[id number]] Collected: 06/27/22 1432    Order Status: Sent Lab Status: In process Updated: 06/27/22 1433    Specimen: Urine           Final Active Diagnoses:    Diagnosis Date Noted POA    PRINCIPAL PROBLEM:  LVAD (left ventricular assist device) present [Z95.811] 06/15/2018 Not Applicable    Impaired mobility [Z74.09] 08/30/2022 Unknown    Acute on chronic combined systolic and diastolic heart failure [I50.43] 08/30/2022 Unknown    Stage 3b chronic kidney disease [N18.32] 08/28/2022 Unknown    Hypertensive cardiovascular-renal disease, stage 1-4 or unspecified chronic kidney disease, with heart failure [I13.0] 08/28/2022 Unknown    High risk medications (not anticoagulants) long-term use [Z79.899] 08/28/2022 Not Applicable    Dilated cardiomyopathy [I42.0] 08/28/2022 Unknown    Anticoagulation monitoring, INR range 2-3 [Z79.01] 08/28/2022 Not Applicable    Hyponatremia [E87.1] 08/23/2022 Unknown    Adjustment disorder with mixed anxiety and depressed mood [F43.23] 08/13/2022 Unknown    Acute on chronic combined systolic and diastolic congestive heart failure [I50.43]  Yes    History of ventricular tachycardia [Z86.79]  Not Applicable    Amiodarone-induced hyperthyroidism [E05.80, T46.2X5A] 01/13/2020 Yes    VT (ventricular tachycardia) [I47.2] 10/12/2019 No    Hypoglycemia [E16.2] 03/08/2018 No    Palliative care encounter [Z51.5] 03/07/2018 Not Applicable    Chronic combined systolic and diastolic heart failure [I50.42] 09/23/2015 Yes     Chronic      Problems Resolved During this Admission:    Diagnosis Date Noted Date Resolved POA    Essential hypertension [I10] 08/28/2022 08/28/2022 Unknown    LVAD (left ventricular assist device) present [Z95.811] 08/28/2022 08/29/2022 Not Applicable    Constipation [K59.00] 08/23/2022 08/29/2022 No     Dizziness [R42] 08/23/2022 08/29/2022 No    Hyperkalemia [E87.5] 08/14/2022 08/27/2022 Unknown    Atrial flutter [I48.92] 07/30/2022 08/27/2022 No    Acute hypercapnic respiratory failure [J96.02]  08/27/2022 Unknown    Acute hypoxemic respiratory failure [J96.01]  08/27/2022 Unknown    A-fib [I48.91]  08/27/2022 Unknown    Shock [R57.9] 07/23/2022 08/27/2022 Clinically Undetermined    Hypernatremia [E87.0] 07/19/2022 08/27/2022 No    Personal history of extracorporeal membrane oxygenation (ECMO) [Z92.81]  08/27/2022 Not Applicable    Encounter for weaning from ventilator [Z99.11]  08/27/2022 Not Applicable    Moderate protein-calorie malnutrition [E44.0] 07/18/2022 08/27/2022 No    Metabolic alkalosis [E87.3]  08/27/2022 No    WAGNER (acute kidney injury) [N17.9] 07/15/2022 08/27/2022 Unknown    COVID-19 virus infection [U07.1] 06/27/2022 08/26/2022 Unknown    Dilated cardiomyopathy [I42.0]  08/23/2022 Yes    Essential hypertension [I10] 04/24/2020 08/23/2022 Yes    Elevated serum lactate dehydrogenase (LDH) [R74.02] 04/23/2020 08/23/2022 Yes    Heart replaced by heart assist device [Z95.811]  08/26/2022 Not Applicable    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, E03.2] 11/08/2019 08/29/2022 Yes    Shortness of breath [R06.02] 10/12/2019 08/25/2022 Yes    Class 1 obesity due to excess calories with serious comorbidity and body mass index (BMI) of 32.0 to 32.9 in adult [E66.09, Z68.32]  08/28/2022 Not Applicable    History of bleeding ulcers [Z87.11]  08/25/2022 Not Applicable    Leukocytosis [D72.829] 07/17/2019 08/25/2022 Unknown    Anticoagulation monitoring, INR range 2-3 [Z79.01] 03/27/2018 08/26/2022 Not Applicable    LVAD (left ventricular assist device) present [Z95.811] 03/08/2018 08/23/2022 Not Applicable    Hypertensive cardiovascular-renal disease, stage 1-4 or unspecified chronic kidney disease, with heart failure [I13.0]  08/26/2022 Yes    Acute decompensated heart failure [I50.9] 01/12/2018 08/26/2022  Yes    Stage 3b chronic kidney disease [N18.32] 01/12/2018 08/26/2022 Yes    High risk medications (amiodarone) long-term use [Z79.899] 01/10/2018 08/26/2022 Not Applicable    Acute on chronic systolic congestive heart failure [I50.23]  08/26/2022 Yes      Discharged Condition: fair    Disposition: Rehab Facility    Follow Up:    Patient Instructions:   No discharge procedures on file.  Medications:  Reconciled Home Medications:      Medication List        START taking these medications      magnesium oxide 400 mg (241.3 mg magnesium) tablet  Commonly known as: MAG-OX  Take 1 tablet (400 mg total) by mouth 2 (two) times daily.     mirtazapine 30 MG tablet  Commonly known as: REMERON  Take 1 tablet (30 mg total) by mouth every evening.     omega-3 acid ethyl esters 1 gram capsule  Commonly known as: LOVAZA  Take 2 capsules (2 g total) by mouth 2 (two) times daily.     polyethylene glycol 17 gram Pwpk  Commonly known as: GLYCOLAX  Take 17 g by mouth once daily.  Start taking on: September 2, 2022            CHANGE how you take these medications      ALPRAZolam 1 MG tablet  Commonly known as: XANAX  TAKE 1 TABLET BY MOUTH TWICE A DAILY AS NEEDED FOR ANXIETY.  What changed: See the new instructions.     levothyroxine 50 MCG tablet  Commonly known as: SYNTHROID  Take 1 tablet (50 mcg total) by mouth before breakfast.  Start taking on: September 2, 2022  What changed:   medication strength  how much to take     torsemide 20 MG Tab  Commonly known as: DEMADEX  TAKE 2 TABLETS BY MOUTH ONCE DAILY  What changed: how much to take     warfarin 5 MG tablet  Commonly known as: COUMADIN  Take 1.5 tablets (7.5 mg total) by mouth Daily.  What changed:   how much to take  how to take this  when to take this  additional instructions            CONTINUE taking these medications      amiodarone 200 MG Tab  Commonly known as: PACERONE  Take 2 tablets (400 mg total) by mouth once daily.     aspirin 81 MG EC tablet  Commonly known as:  ECOTRIN  Take 1 tablet (81 mg total) by mouth once daily.     pantoprazole 40 MG tablet  Commonly known as: PROTONIX  TAKE 1 TABLET (40 MG TOTAL) BY MOUTH DAILY WITH LUNCH.     zolpidem 12.5 MG CR tablet  Commonly known as: AMBIEN CR  TAKE 1 TABLET (12.5 MG TOTAL) BY MOUTH NIGHTLY AS NEEDED FOR INSOMNIA.            STOP taking these medications      allopurinoL 100 MG tablet  Commonly known as: ZYLOPRIM     amLODIPine 10 MG tablet  Commonly known as: NORVASC     busPIRone 5 MG Tab  Commonly known as: BUSPAR     carvediloL 3.125 MG tablet  Commonly known as: COREG     ferrous gluconate 324 MG tablet  Commonly known as: FERGON     potassium chloride SA 20 MEQ tablet  Commonly known as: K-DUR,KLOR-CON     sildenafiL 100 MG tablet  Commonly known as: VIAGRA     vitamin D 1000 units Tab  Commonly known as: VITAMIN D3            ASK your doctor about these medications      mexiletine 250 MG Cap  Commonly known as: MEXITIL  Take 1 capsule (250 mg total) by mouth every 8 (eight) hours.  Ask about: Which instructions should I use?              Loki Randall, NP  Heart Transplant  [facility] - Cardiology Stepdown

## 2022-09-01 NOTE — PROGRESS NOTES
DISCHARGE - [facility]     SW to pt's room for discharge today to [facility].   Pt presents as AAO x4, calm, cooperative, and asking and answering questions appropriately.  Pt verbalizes understanding and agreement with plan for d/c to [facility] today.  Rosalina DIAZ set up [facility] transport for PT.  Pt denies any d/c needs, and none identified by medical team.  Pt reports coping well at this time, and denies any needs or concerns to SW.  SW providing ongoing psychosocial and counseling support, education, resources, assistance, and discharge planning as indicated.  SW remains available.

## 2022-09-01 NOTE — PT/OT/SLP PROGRESS
"Occupational Therapy  Co- Treatment    Name: Tim Richards  MRN: [id number]  Admitting Diagnosis:  LVAD (left ventricular assist device) present  28 Days Post-Op    Recommendations:     Discharge Recommendations: rehabilitation facility      Assessment:     Tim Richards is a 56 y.o. male with a medical diagnosis of LVAD (left ventricular assist device) present.  Performance deficits affecting function are weakness, impaired endurance, impaired self care skills, impaired functional mobility, gait instability, impaired balance, pain. Pt tolerated session fair. Pt reports limited by abdominal pain and poor tasting food and poor food quantity.     Rehab Prognosis:  Good  patient would benefit from acute skilled OT services to address these deficits and reach maximum level of function.       Plan:     Patient to be seen 5 x/week to address the above listed problems via self-care/home management, therapeutic activities, therapeutic exercises  Plan of Care Expires: 08/25/22  Plan of Care Reviewed with: patient    Subjective   Pt states, "I'm going to rehab today."   Pt states during session, "I just don't fell like doing this."     Pain/Comfort:  Pain Rating 1: 6/10  Location 1: abdomen  Pain Addressed 1: Reposition, Distraction    Objective:     Communicated with: nsg prior to session.  Patient found supine in bed with tele, LVAD to wall power, 5 LPM oxygen via trach collar.   Cotx completed this date to optimize functional performance and safety given impaired tolerance for activity.     General Precautions: Standard, fall, LVAD      Occupational Performance:     Bed Mobility:    Supine<>sit with SBA     Functional Mobility/Transfers:  Sit>stand with MIN A for partal stand. Pt holding RW with B UE support but not fully standing upright. After few seconds pt returned sitting and stated he no longer wanted to participate.       Activities of Daily Living:  Feeding: independent  G/H set-up supported sitting per " report but declined engagement with task.   UE/LE dressing: Pt denies having clothes. OT provided education re: importance of dressing daily and for spouse to bring clothes from home for upcoming session.       [facility] 6 Click ADL: 17    Treatment & Education:  Self care skills completed as they related to LVAD mildred't. Pt reports self test completed this AM prior to OT arrival. Minimal assist needed to document numbers in LVAD binder. Pt transition LVAD to battery power and placed items in shoulder bag with set-up. However, pt pausing often during LVAD mildred't.  Pt unable to verbalize why he would stop engaging with the task and needed several cues for continuation of LVAD mildred't.   One standing trial completed and pt declining further participation.   Education provided re: prolonged immobility and importance of therapy to allow for progress with ADl and mobility skills. Pt stating the poor tasting and quantity of food and abdominal pain as reasons for inability to continue with session.     Patient left supine with all lines intact, call button in reach, and nsg notified    GOALS:   Multidisciplinary Problems       Occupational Therapy Goals          Problem: Occupational Therapy    Goal Priority Disciplines Outcome Interventions   Occupational Therapy Goal     OT, PT/OT Ongoing, Progressing    Description: Goals to be met by: 8/9/22     Patient will increase functional independence with ADLs by performing:    UE Dressing with Stand-by Assistance.  LE Dressing with Stand-by Assistance.  Grooming while standing at sink with Stand-by Assistance.  Toileting from toilet with Stand-by Assistance for hygiene and clothing management.   Toilet transfer to toilet with Stand-by Assistance.                   Multidisciplinary Problems (Resolved)          Problem: Occupational Therapy    Goal Priority Disciplines Outcome Interventions   Occupational Therapy Goal   (Resolved)     OT, PT/OT Met           Problem: Occupational  Therapy    Goal Priority Disciplines Outcome Interventions   Occupational Therapy Goal   (Resolved)     OT, PT/OT Met                        Time Tracking:     OT Date of Treatment: 09/01/22  OT Start Time: 0840  OT Stop Time: 0905  OT Total Time (min): 25 min    Billable Minutes:Self Care/Home Management 25    OT/CARY: OT          9/1/2022

## 2022-09-01 NOTE — NURSING
Patient being sent to rehab with-   Battery charger: [id number]  Module: [id number]     VAD coordinator Jack Guerrier notified

## 2022-09-01 NOTE — PLAN OF CARE
Patient is ready for discharge to [facility]. Patient stable alert and oriented. Midline to [facility] sent with patient. No complaints of pain. Discussed discharge plan. Reviewed medications and side effects, appointments, and answered questions with patient and family.

## 2022-09-01 NOTE — ASSESSMENT & PLAN NOTE
-The patient presented with COVID and found to have an uptrending LDH with increased power. He underwent HM III upgrade on 7/13/22.  -ASA 81mg.  -INR goal 2-3. therapeutic.   -BP controlled.   -Restarted Po diuretic.   -Echo 8/30: EF 15%, LViDD 7.44, TAPSE 1.55, AV does not open. The ventricular septum is at midline.   -PT/OT/Speech. Will likely need rehab.     Procedure: Device Interrogation Including analysis of device parameters  Current Settings: Ventricular Assist Device    TXP LVAD INTERROGATIONS 9/1/2022 9/1/2022 8/31/2022 8/31/2022 8/31/2022 8/31/2022 8/31/2022   Type HeartMate3 HeartMate3 HeartMate3 HeartMate3 HeartMate3 HeartMate3 HeartMate3   Flow 5.6 5.5 5.6 5.4 5.1 5.4 5.3   Speed 6300 6300 6300 6300 6300 6300 6300   PI 1.9 2.9 2.8 3.0 3.0 3.3 3.2   Power ([place]) 5.3 5.4 5.3 5.2 5.0 5.1 5.2   LSL - - - - 5900 5900 5900   Low Flow Alarm - no no no - - -   High Power Alarm - - - - - - -   Pulsatility - Intermittent pulse - - - - -

## 2022-09-01 NOTE — SUBJECTIVE & OBJECTIVE
Interval History: No acute events overnight. Motivated. Ready to get to rehab.     Continuous Infusions:   dextrose 10 % in water (D10W)       Scheduled Meds:   amiodarone  400 mg Oral Daily    aspirin  81 mg Oral Daily    levalbuterol  0.63 mg Nebulization Q6H    levothyroxine  50 mcg Oral Before breakfast    magnesium oxide  400 mg Oral BID    mexiletine  250 mg Oral Q8H    mirtazapine  30 mg Oral QHS    ondansetron  4 mg Intravenous Once    pantoprazole  40 mg Oral Daily    polyethylene glycol  17 g Oral Daily    senna-docusate 8.6-50 mg  1 tablet Oral Daily    torsemide  20 mg Oral Daily    warfarin  7.5 mg Oral Daily     PRN Meds:sodium chloride 0.9%, acetaminophen, ALPRAZolam, bisacodyL, dextrose 10 % in water (D10W), dextrose 10%, dextrose 10%, glucagon (human recombinant), glucose, guaiFENesin 100 mg/5 ml, HYDROmorphone, HYDROmorphone, hydrOXYzine HCL, meclizine, melatonin, ondansetron    Review of patient's allergies indicates:   Allergen Reactions    Lisinopril Anaphylaxis    Hydralazine analogues      Chronic constipation, impotence, dizziness     Objective:     Vital Signs (Most Recent):  Temp: 97.8 °F (36.6 °C) (09/01/22 0742)  Pulse: 81 (09/01/22 0656)  Resp: 20 (09/01/22 0742)  BP: (!) 72/0 (09/01/22 0809)  SpO2: 100 % (09/01/22 0445)   Vital Signs (24h Range):  Temp:  [97.5 °F (36.4 °C)-98.7 °F (37.1 °C)] 97.8 °F (36.6 °C)  Pulse:  [74-90] 81  Resp:  [14-20] 20  SpO2:  [93 %-100 %] 100 %  BP: ()/(0-82) 72/0     Patient Vitals for the past 72 hrs (Last 3 readings):   Weight   08/30/22 1620 92.1 kg (203 lb)   08/30/22 0400 92.4 kg (203 lb 11.2 oz)       Body mass index is 26.78 kg/m².      Intake/Output Summary (Last 24 hours) at 9/1/2022 0847  Last data filed at 9/1/2022 0457  Gross per 24 hour   Intake 342 ml   Output 2025 ml   Net -1683 ml            Telemetry: No significant arrhythmic events overnight.    Physical Exam  Vitals and nursing note reviewed.   Constitutional:       General: He is  not in acute distress.     Appearance: Normal appearance. He is well-developed. He is not ill-appearing.   HENT:      Head: Normocephalic and atraumatic.      Ears:      Abel exam findings: Lateralizes left.     Right Rinne: AC > BC.     Left Rinne: AC > BC.     Nose: Nose normal.      Mouth/Throat:      Mouth: Mucous membranes are moist.      Comments: Tracheostomy site is clean and dry without drainage  Eyes:      Extraocular Movements: Extraocular movements intact.      Conjunctiva/sclera: Conjunctivae normal.      Pupils: Pupils are equal, round, and reactive to light.   Cardiovascular:      Rate and Rhythm: Normal rate and regular rhythm.      Pulses: Normal pulses.      Heart sounds: Normal heart sounds.      Comments: VAD hum can be heard, no significant JVD noted on exam  Pulmonary:      Effort: Pulmonary effort is normal.      Breath sounds: Normal breath sounds. No stridor.   Abdominal:      General: Abdomen is flat. Bowel sounds are normal. There is no distension.      Palpations: Abdomen is soft.      Tenderness: There is no abdominal tenderness. There is no guarding.   Musculoskeletal:         General: Normal range of motion.      Cervical back: Normal range of motion and neck supple. No rigidity.   Lymphadenopathy:      Cervical: No cervical adenopathy.   Skin:     General: Skin is warm and dry.      Capillary Refill: Capillary refill takes less than 2 seconds.   Neurological:      General: No focal deficit present.      Mental Status: He is alert and oriented to person, place, and time.   Psychiatric:         Mood and Affect: Mood normal.         Behavior: Behavior normal.         Thought Content: Thought content normal.         Judgment: Judgment normal.       Significant Labs:  CBC:  Recent Labs   Lab 08/28/22  0506 08/28/22  0719 08/29/22  0428 08/30/22  0454   WBC 6.79  --  5.28 5.14   RBC 2.79*  --  2.58* 2.59*   HGB 7.8* 7.1* 7.1* 7.1*   HCT 26.7*  --  24.5* 23.8*     --  176 167   MCV  96  --  95 92   MCH 28.0  --  27.5 27.4   MCHC 29.2*  --  29.0* 29.8*       BNP:  Recent Labs   Lab 08/26/22  0425 08/29/22  0428 08/31/22 0520   * 171* 364*       CMP:  Recent Labs   Lab 08/30/22  0454 08/31/22 0520 09/01/22 0456    72 69*   CALCIUM 9.1 9.2 8.9   * 136 137   K 4.6 4.1 4.2   CO2 30* 31* 31*   CL 99 98 99   BUN 16 16 17   CREATININE 0.9 1.0 1.0        Coagulation:   Recent Labs   Lab 08/28/22  0506 08/29/22 0428 08/30/22 0454 08/31/22 0520 09/01/22 0456   INR 1.5* 1.8* 1.9* 2.2* 2.5*   APTT 40.7* 45.3* 60.6*  --   --        LDH:  Recent Labs   Lab 08/30/22 0454 08/31/22 0520 09/01/22 0456   * 272* 235       Microbiology:  Microbiology Results (last 7 days)       ** No results found for the last 168 hours. **            I have reviewed all pertinent labs within the past 24 hours.    Estimated Creatinine Clearance: 94.3 mL/min (based on SCr of 1 mg/dL).    Diagnostic Results:  I have reviewed all pertinent imaging results/findings within the past 24 hours.

## 2022-09-01 NOTE — PROGRESS NOTES
[facility] - Cardiology Stepdown  Heart Transplant  Progress Note  Patient Name: Tim Richards  MRN: [id number]  Admission Date: 6/27/2022  Hospital Length of Stay: 66 days  Attending Physician: Amy Rhodes MD  Primary Care Provider: Deyanira Booth MD  Principal Problem:LVAD (left ventricular assist device) present    Subjective:     Interval History: No acute events overnight. Motivated. Ready to get to rehab.     Continuous Infusions:   dextrose 10 % in water (D10W)       Scheduled Meds:   amiodarone  400 mg Oral Daily    aspirin  81 mg Oral Daily    levalbuterol  0.63 mg Nebulization Q6H    levothyroxine  50 mcg Oral Before breakfast    magnesium oxide  400 mg Oral BID    mexiletine  250 mg Oral Q8H    mirtazapine  30 mg Oral QHS    ondansetron  4 mg Intravenous Once    pantoprazole  40 mg Oral Daily    polyethylene glycol  17 g Oral Daily    senna-docusate 8.6-50 mg  1 tablet Oral Daily    torsemide  20 mg Oral Daily    warfarin  7.5 mg Oral Daily     PRN Meds:sodium chloride 0.9%, acetaminophen, ALPRAZolam, bisacodyL, dextrose 10 % in water (D10W), dextrose 10%, dextrose 10%, glucagon (human recombinant), glucose, guaiFENesin 100 mg/5 ml, HYDROmorphone, HYDROmorphone, hydrOXYzine HCL, meclizine, melatonin, ondansetron    Review of patient's allergies indicates:   Allergen Reactions    Lisinopril Anaphylaxis    Hydralazine analogues      Chronic constipation, impotence, dizziness     Objective:     Vital Signs (Most Recent):  Temp: 97.8 °F (36.6 °C) (09/01/22 0742)  Pulse: 81 (09/01/22 0656)  Resp: 20 (09/01/22 0742)  BP: (!) 72/0 (09/01/22 0809)  SpO2: 100 % (09/01/22 0445)   Vital Signs (24h Range):  Temp:  [97.5 °F (36.4 °C)-98.7 °F (37.1 °C)] 97.8 °F (36.6 °C)  Pulse:  [74-90] 81  Resp:  [14-20] 20  SpO2:  [93 %-100 %] 100 %  BP: ()/(0-82) 72/0     Patient Vitals for the past 72 hrs (Last 3 readings):   Weight   08/30/22 1620 92.1 kg (203 lb)   08/30/22 0400 92.4 kg (203 lb 11.2  oz)       Body mass index is 26.78 kg/m².      Intake/Output Summary (Last 24 hours) at 9/1/2022 0847  Last data filed at 9/1/2022 0457  Gross per 24 hour   Intake 342 ml   Output 2025 ml   Net -1683 ml            Telemetry: No significant arrhythmic events overnight.    Physical Exam  Vitals and nursing note reviewed.   Constitutional:       General: He is not in acute distress.     Appearance: Normal appearance. He is well-developed. He is not ill-appearing.   HENT:      Head: Normocephalic and atraumatic.      Ears:      Abel exam findings: Lateralizes left.     Right Rinne: AC > BC.     Left Rinne: AC > BC.     Nose: Nose normal.      Mouth/Throat:      Mouth: Mucous membranes are moist.      Comments: Tracheostomy site is clean and dry without drainage  Eyes:      Extraocular Movements: Extraocular movements intact.      Conjunctiva/sclera: Conjunctivae normal.      Pupils: Pupils are equal, round, and reactive to light.   Cardiovascular:      Rate and Rhythm: Normal rate and regular rhythm.      Pulses: Normal pulses.      Heart sounds: Normal heart sounds.      Comments: VAD hum can be heard, no significant JVD noted on exam  Pulmonary:      Effort: Pulmonary effort is normal.      Breath sounds: Normal breath sounds. No stridor.   Abdominal:      General: Abdomen is flat. Bowel sounds are normal. There is no distension.      Palpations: Abdomen is soft.      Tenderness: There is no abdominal tenderness. There is no guarding.   Musculoskeletal:         General: Normal range of motion.      Cervical back: Normal range of motion and neck supple. No rigidity.   Lymphadenopathy:      Cervical: No cervical adenopathy.   Skin:     General: Skin is warm and dry.      Capillary Refill: Capillary refill takes less than 2 seconds.   Neurological:      General: No focal deficit present.      Mental Status: He is alert and oriented to person, place, and time.   Psychiatric:         Mood and Affect: Mood normal.          Behavior: Behavior normal.         Thought Content: Thought content normal.         Judgment: Judgment normal.       Significant Labs:  CBC:  Recent Labs   Lab 08/28/22  0506 08/28/22  0719 08/29/22  0428 08/30/22  0454   WBC 6.79  --  5.28 5.14   RBC 2.79*  --  2.58* 2.59*   HGB 7.8* 7.1* 7.1* 7.1*   HCT 26.7*  --  24.5* 23.8*     --  176 167   MCV 96  --  95 92   MCH 28.0  --  27.5 27.4   MCHC 29.2*  --  29.0* 29.8*       BNP:  Recent Labs   Lab 08/26/22  0425 08/29/22  0428 08/31/22  0520   * 171* 364*       CMP:  Recent Labs   Lab 08/30/22  0454 08/31/22  0520 09/01/22  0456    72 69*   CALCIUM 9.1 9.2 8.9   * 136 137   K 4.6 4.1 4.2   CO2 30* 31* 31*   CL 99 98 99   BUN 16 16 17   CREATININE 0.9 1.0 1.0        Coagulation:   Recent Labs   Lab 08/28/22  0506 08/29/22  0428 08/30/22  0454 08/31/22  0520 09/01/22  0456   INR 1.5* 1.8* 1.9* 2.2* 2.5*   APTT 40.7* 45.3* 60.6*  --   --        LDH:  Recent Labs   Lab 08/30/22  0454 08/31/22 0520 09/01/22  0456   * 272* 235       Microbiology:  Microbiology Results (last 7 days)       ** No results found for the last 168 hours. **            I have reviewed all pertinent labs within the past 24 hours.    Estimated Creatinine Clearance: 94.3 mL/min (based on SCr of 1 mg/dL).    Diagnostic Results:  I have reviewed all pertinent imaging results/findings within the past 24 hours.    Assessment and Plan:     55 Y/O M with Hx of stage D HFrEF (EF 10%) due to NICM underwent HM2 implant 3/8/18 and exchange of outflow graft 3/9/18, Hx VT/VF s/p SICD 2014 presenting with gradual worsening shortness of breath associated with nonpleuritic, nonradiating bilateral 4/10 retrosternal chest pressure pain.  Symptoms began yesterday and have gradually worsened in the last 12 hours.  He does report going to a family picnic with increased consumption of chicken wings, ribs and other salty meat products.  Prior to symptom onset, he reports nausea,  nonbloody diarrhea began yesterday and single episode of nonbloody nonbilious vomiting this morning. He also complains of dizziness, lightheadedness, and a mild HA. SOB worsened this morning prompting him to come to the ED.     In the ED, patient was in respiratory distress requiring BiPAP. MAP 96 and started on Nitro gtt. Work-up revealed WBC 10, K 5.4, Cr 2.5 (baseline 1.9), BNP 1950 (baseline 200-300s), Bili 2.1, LDH 1058, and INR 3.2. Bedside TTE with severely reduced EF, AV not opening, septum bulging into RV. Given IV Lasix 80 mg x1 with only 100-200 mL UOP and dark in color. HM2 interrogated and flows have been 8.5-9 L/min with no alarms or power surges. Cardiology consulted in the ED, dicussed with HTS, and decision made to admit to ICU for further mgmt.       * LVAD (left ventricular assist device) present  -The patient presented with COVID and found to have an uptrending LDH with increased power. He underwent HM III upgrade on 7/13/22.  -ASA 81mg.  -INR goal 2-3. therapeutic.   -BP controlled.   -Restarted Po diuretic.   -Echo 8/30: EF 15%, LViDD 7.44, TAPSE 1.55, AV does not open. The ventricular septum is at midline.   -PT/OT/Speech. Will likely need rehab.     Procedure: Device Interrogation Including analysis of device parameters  Current Settings: Ventricular Assist Device    TXP LVAD INTERROGATIONS 9/1/2022 9/1/2022 8/31/2022 8/31/2022 8/31/2022 8/31/2022 8/31/2022   Type HeartMate3 HeartMate3 HeartMate3 HeartMate3 HeartMate3 HeartMate3 HeartMate3   Flow 5.6 5.5 5.6 5.4 5.1 5.4 5.3   Speed 6300 6300 6300 6300 6300 6300 6300   PI 1.9 2.9 2.8 3.0 3.0 3.3 3.2   Power ([place]) 5.3 5.4 5.3 5.2 5.0 5.1 5.2   LSL - - - - 5900 5900 5900   Low Flow Alarm - no no no - - -   High Power Alarm - - - - - - -   Pulsatility - Intermittent pulse - - - - -       History of ventricular tachycardia  Patient experienced an episode of VT on 6/30 and experienced an ICD shock thought to be related to hypokalemia (K of 3.1 on  6/30)  - Significant VT hx previously controlled with amio 400mg q d.   EP consulted for mexiletine dosing as pt was having significant nausea and dizziness.   -Meds changed to mexiletine 250mg TID  and amio increased to 400mg.    Acute on chronic combined systolic and diastolic congestive heart failure  -not on GDMT due to hypotension    Amiodarone-induced hyperthyroidism   -Appreciate  Endo. Continue prednisone 5 mg daily through 8/31 to prevent iatrogenic AI and then discontinue  - Continue Levothyroxine 50 mcg daily  - Repeat TSH/Free T4 level on 9/2, Endocrine will monitor peripherally until then    VT (ventricular tachycardia)  Significant VT hx previously controlled with amio 400mg q d.   EP consulted for mexiletine dosing as pt was having significant nausea and dizziness.   -Meds changed to mexiletine 250mg TID  and amio increased to 400mg.    Hypoglycemia  - due to poor PO intake    Chronic combined systolic and diastolic heart failure  -Previously on HM2 but complicated by thrombosis related to COVID  -Underwent HM III upgrade on 7/13/22  -Volume status: Euvolemic, will monitor. No diuretics for now  -Chest tube removal, bronch, and old driveline removed 7/22  -Currently being bridged with heparin, continue coumadin to goal INR of 2-3  - Daily INRs      Loki Randall NP  Heart Transplant  [facility] - Cardiology Stepdown

## 2022-09-01 NOTE — PLAN OF CARE
Requested Ambulance pickup for 2pm to transfer patient to rehab   Patient requires 5lpm NC/Trache Collar  LVAD --HM 3  W2D dressing ---wound vac to be placed again by rehab SC nurse  Alona sent up to bedside for administration at Rehab      Please call [phone] for any questions concerns with transportation     Call report to [phone]

## 2022-09-01 NOTE — PLAN OF CARE
OK for Wet-2-Guero dressing on transfer and have Rehab WC nurse apply Vac dressing and negative pressure device in am at IPR

## 2022-09-01 NOTE — PLAN OF CARE
Pt cooperative with routine care and procedures. Pt VSS. Pt turned as needed and reminded to call for assistance if s/s of distress occur. Bed locked and in lowest position. Call bell and urinal within reach. Pt remained free from s/s of distress and pain. Pt on 5L O2 and 28% oxygen. Pt suctioned as needed.     Problem: Adult Inpatient Plan of Care  Goal: Plan of Care Review  Outcome: Ongoing, Progressing  Flowsheets (Taken 9/1/2022 0650)  Plan of Care Reviewed With: patient  Goal: Patient-Specific Goal (Individualized)  Outcome: Ongoing, Progressing  Flowsheets (Taken 9/1/2022 0650)  Anxieties, Fears or Concerns: Wanting to leave asap  Individualized Care Needs: Monitor oxygen needs, labs, and VS  Patient-Specific Goals (Include Timeframe): Pt will go to rehab by the end of today  Goal: Absence of Hospital-Acquired Illness or Injury  Outcome: Ongoing, Progressing  Goal: Optimal Comfort and Wellbeing  Outcome: Ongoing, Progressing  Goal: Readiness for Transition of Care  Outcome: Ongoing, Progressing     Problem: Infection  Goal: Absence of Infection Signs and Symptoms  Outcome: Ongoing, Progressing     Problem: Neutropenia  Goal: Absence of Infection  Outcome: Ongoing, Progressing     Problem: Skin Injury Risk Increased  Goal: Skin Health and Integrity  Outcome: Ongoing, Progressing     Problem: Adjustment to Device (Ventricular Assist Device)  Goal: Optimal Adjustment to Device  Outcome: Ongoing, Progressing     Problem: Bleeding (Ventricular Assist Device)  Goal: Absence of Bleeding  Outcome: Ongoing, Progressing

## 2022-09-02 ENCOUNTER — TELEPHONE (OUTPATIENT)
Dept: CARDIOTHORACIC SURGERY | Facility: CLINIC | Age: 57
End: 2022-09-02
Payer: COMMERCIAL

## 2022-09-02 NOTE — PROGRESS NOTES
DISCHARGE NOTE:    Tim Richards is a 56 y.o. male s/p HM2 lvad from 3.18 with underwent pump exchange to HM3 on 7.13.22 for suspected pump thrombus.     Past Medical History:   Diagnosis Date    A-fib     Anticoagulant long-term use     Atrial flutter 7/30/2022    CHF (congestive heart failure)     Class 1 obesity due to excess calories with serious comorbidity and body mass index (BMI) of 31.0 to 31.9 in adult     Class 1 obesity due to excess calories with serious comorbidity and body mass index (BMI) of 32.0 to 32.9 in adult     Dilated cardiomyopathy 1/10/2018    Disorder of kidney and ureter     CKD    Encounter for blood transfusion     Essential hypertension 8/28/2022    Gout     HTN (hypertension)     Hx of psychiatric care     ICD (implantable cardioverter-defibrillator) infection 7/1/2020    Psychiatric problem     Thyroid disease     Ventricular tachycardia (paroxysmal)        Hospital Course: During his hospital stay Mr. Richards underwent pump exchange. He was supported with ECMO. Of note, he was evaluated by endocrinology for hyperthyroidism and was started on synthroid 50mcg daily.     Pharmacy Interventions/Recommendations:  1) INR Goal: 2-3    2) Antiplatelet Agents: 81mg    3) Heparin Bridging:  UFH    4) INR Follow-Up/Discharge Needs:  Monday with coumadin clinic     See list of discharge medication for dosing instructions.     Tim Richards and his caregiver verbalized their understanding and had the opportunity to ask questions.      Discharge Medications:     Medication List        START taking these medications      magnesium oxide 400 mg (241.3 mg magnesium) tablet  Commonly known as: MAG-OX  Take 1 tablet (400 mg total) by mouth 2 (two) times daily.     mirtazapine 30 MG tablet  Commonly known as: REMERON  Take 1 tablet (30 mg total) by mouth every evening.     omega-3 acid ethyl esters 1 gram capsule  Commonly known as: LOVAZA  Take 2 capsules (2 g total) by mouth 2 (two) times  daily.     polyethylene glycol 17 gram Pwpk  Commonly known as: GLYCOLAX  Take 17 g by mouth once daily.            CHANGE how you take these medications      ALPRAZolam 1 MG tablet  Commonly known as: XANAX  TAKE 1 TABLET BY MOUTH TWICE A DAILY AS NEEDED FOR ANXIETY.  What changed: See the new instructions.     levothyroxine 50 MCG tablet  Commonly known as: SYNTHROID  Take 1 tablet (50 mcg total) by mouth before breakfast.  What changed:   medication strength  how much to take     torsemide 20 MG Tab  Commonly known as: DEMADEX  TAKE 2 TABLETS BY MOUTH ONCE DAILY  What changed: how much to take     warfarin 5 MG tablet  Commonly known as: COUMADIN  Take 1.5 tablets (7.5 mg total) by mouth Daily.  What changed:   how much to take  how to take this  when to take this  additional instructions            CONTINUE taking these medications      amiodarone 200 MG Tab  Commonly known as: PACERONE  Take 2 tablets (400 mg total) by mouth once daily.     aspirin 81 MG EC tablet  Commonly known as: ECOTRIN  Take 1 tablet (81 mg total) by mouth once daily.     pantoprazole 40 MG tablet  Commonly known as: PROTONIX  TAKE 1 TABLET (40 MG TOTAL) BY MOUTH DAILY WITH LUNCH.     zolpidem 12.5 MG CR tablet  Commonly known as: AMBIEN CR  TAKE 1 TABLET (12.5 MG TOTAL) BY MOUTH NIGHTLY AS NEEDED FOR INSOMNIA.            STOP taking these medications      allopurinoL 100 MG tablet  Commonly known as: ZYLOPRIM     amLODIPine 10 MG tablet  Commonly known as: NORVASC     busPIRone 5 MG Tab  Commonly known as: BUSPAR     carvediloL 3.125 MG tablet  Commonly known as: COREG     ferrous gluconate 324 MG tablet  Commonly known as: FERGON     potassium chloride SA 20 MEQ tablet  Commonly known as: K-DURKLOR-CON     sildenafiL 100 MG tablet  Commonly known as: VIAGRA     vitamin D 1000 units Tab  Commonly known as: VITAMIN D3            ASK your doctor about these medications      mexiletine 250 MG Cap  Commonly known as: MEXITIL  Take 1 capsule  (250 mg total) by mouth every 8 (eight) hours.  Ask about: Which instructions should I use?               Where to Get Your Medications        These medications were sent to [facility]/pharmacy #[id number] - [place] - [address]  [address] [place] [address]      Phone: [phone]   ALPRAZolam 1 MG tablet  torsemide 20 MG Tab  zolpidem 12.5 MG CR tablet       These medications were sent to [facility]  [address], [place] [address]      Hours: Mon-Fri 7a-7p, Sat-Sun 10a-4p Phone: [phone]   mexiletine 250 MG Cap       Information about where to get these medications is not yet available    Ask your nurse or doctor about these medications  levothyroxine 50 MCG tablet  magnesium oxide 400 mg (241.3 mg magnesium) tablet  mirtazapine 30 MG tablet  omega-3 acid ethyl esters 1 gram capsule  polyethylene glycol 17 gram Pwpk  warfarin 5 MG tablet

## 2022-09-02 NOTE — TELEPHONE ENCOUNTER
Called and spoke to pt's wound care nurse, Alycia, at [facility] ([phone]) regarding pt's old driveline site.  Alycia stated that it is healing well and noticed that there are not any orders to address the site.  Alycia stated that pt arrived to [facility] yesterday from [facility] [place] with collagen wound gel and iodoform.  I informed Alycia that while hospitalized at [facility] [place], pt had orders to clean the old driveline site daily with NS, pack it loosely with Iodoform, cover with gauze, and a clean dry drsg.  Alycia stated that if it would be ok with CTS, she would like to utilize the collagen wound gel and iodoform packing, dressing the old driveline site with a gauze dressing, daily.  Informed YANET Quick, of Alycia's recommendation to utilize the collagen wound gel and iodoform packing, dressing the site with a gauze dressing, daily, which YANET Mcclure, stated would be fine. Alycia notified of this, which she verbalized understanding to.

## 2022-09-03 LAB — POCT GLUCOSE: 91 MG/DL (ref 70–110)

## 2022-09-05 ENCOUNTER — PATIENT MESSAGE (OUTPATIENT)
Dept: TRANSPLANT | Facility: CLINIC | Age: 57
End: 2022-09-05
Payer: COMMERCIAL

## 2022-09-12 ENCOUNTER — PATIENT OUTREACH (OUTPATIENT)
Dept: ADMINISTRATIVE | Facility: HOSPITAL | Age: 57
End: 2022-09-12
Payer: COMMERCIAL

## 2022-09-12 ENCOUNTER — PATIENT MESSAGE (OUTPATIENT)
Dept: SURGERY | Facility: CLINIC | Age: 57
End: 2022-09-12
Payer: COMMERCIAL

## 2022-09-14 DIAGNOSIS — Z93.0 TRACHEOSTOMY PRESENT: Primary | ICD-10-CM

## 2022-09-15 DIAGNOSIS — Z95.811 LVAD (LEFT VENTRICULAR ASSIST DEVICE) PRESENT: Primary | ICD-10-CM

## 2022-09-19 ENCOUNTER — PATIENT MESSAGE (OUTPATIENT)
Dept: TRANSPLANT | Facility: CLINIC | Age: 57
End: 2022-09-19
Payer: COMMERCIAL

## 2022-09-20 NOTE — PROGRESS NOTES
ADVANCED HEART FAILURE AND TRANSPLANTATION LVAD CLINIC VISIT    CHIEF COMPLAINT:  Follow-up of chronic heart failure post-LVAD    HISTORY OF PRESENT ILLNESS:  Tim Richards is a 56 y.o.  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 Post-op course was prolonged and complicated by worsening cardiogenic shock/RV failure requiring VA-ECMO that was successfully decannulated and Klebsiella aerogenes pneumonia. He also had episodes of VT with ICD discharge 7/21 requiring amio and NSVT episodes requiring addition of lidocaine gtt. This was transitioned to PO amiodarone and mexiletine. He unfortunately required re-intubation 7/29 for hypercapneic respiratory failure after initial extubation 7/27 and developed Aflutter with RVR with drop in PI and BP requiring DCCV. He ultimately underwent tracheostomy 8/4 and weaned off Epi 8/8. He briefly required TPN. He was transitioned back to enteral feeding and subsequently improved from a swallow standpoint and tolerated regular diet. He was having issues with vertigo-like symptoms with unremarkable ENT workup and negative CT heads. Due to higher prolonged doses of mexiletine during hospitalization, this was reduced to 250 mg TID. Amiodarone was adjusted after note of some CT liver parenchyma attenuation with report of copper/iron/med deposition but due to reduction of mexiletine, this was increased to 400 mg daily. The CT had also shown possible signs suggestive of avascular necrosis of the femoral head and discussion with Endocrinology was held to wean steroid dosing for amiodarone-induced hyperthyroidism with recommendations for prednisone 5 mg daily to be continued through 8/31 then discontinued. He remained on methimazole. At the time  of discharge, he had a size 8 cuffed Shiley trach with decision to downsize to be left as an outpatient. He was discharged on 9/1/2022.    Post-VAD complications:  RV failure (early vs. late)-+  Hemocompatibility-related events (CVA, thrombosis, bleeding)-+HM2 => pump exchange  Infection-negative  Arrhythmias-+ VT/AF/flutter    He presents to his first clinic visit today after discharge from rehab. Noted SOB with ambulation. Is going to get home health and home PT/OT now that he is discharged from rehab. Doppler 90, non-pulsatile. Has multiple PI events with speed drops to LSL without alarms. DLES 2 with suture being removed today. Old DLES evaluated by CTS today and groin site evaluated with plans for antibiotics. Last received torsemide 20 mg 2 days ago at rehab. Doesn't have a great appetite just yet and only eating about half of his trays but did not find food great at rehab. He is excited to go home for home cooked meals. He has a retained suture in his new driveline that was difficult to remove in clinic. Denies orthopnea, PND, bendopnea, abdominal distension, early satiety, nausea, lower extremity edema, chest discomfort, presyncope, palpitations, or syncope. Denies adverse bleeding, no hematochezia/melena/hematuria/hematemesis.    INR goal: Goal INR 2.0-3.0 NO bridge   Antiplatelets: Aspirin 81 mg daily  LDH: stable overall  Speed set at:  6300 RPM  Pulsatility: non-pulsatile  Antihypertensives:   Diuretic:   GDMT   VAD interrogation:   TXP SACHI INTERROGATIONS 9/22/2022 9/1/2022 8/31/2022   Type HeartMate3 - -   Flow 5.2 - -   Speed 6300 - -   PI 2.3 - -   Power (Jackson) 5.4 - -   LSL 5900 - -   Pulsatility No Pulse Intermittent pulse Intermittent pulse     I interrogated the patient's HeartMate 3 LVAD.     Current device parameters reviewed. There was no evidence of power spikes or suction events. The driveline was structurally intact without evidence of infection.     Cardiac Data:  Transthoracic Echo  8/30/2022:  ? There is an LVAD present. Base speed is 6300 RPMs. The pump type is a Heartmate III. The interventricular septum appears midline. The aortic valve does not open.  ? The left ventricle is severely enlarged with eccentric hypertrophy and severely decreased systolic function.  ? The estimated ejection fraction is 10%.  ? There is left ventricular global hypokinesis.  ? Left ventricular diastolic dysfunction.  ? There is trivial continuous aortic regurgitation.  ? There is abnormal septal wall motion.  ? The right ventricle is poorly seen, but appears enlarged with reduced systolic function.  ? Mild tricuspid regurgitation.  ? The estimated PA systolic pressure is 31 mmHg.  ? Normal central venous pressure (3 mmHg).  ? Severe left atrial enlargement.    ECG:    Left Heart Catheterization:     Right Heart Catheterization: Results for orders placed during the hospital encounter of 07/01/20    Intra-Procedure Documentation    Narrative  BRITTANY performed in the Invasive Lab  - See Procedure Log link below for nursing documentation  - See BRITTANY order on Card Proc Tab for physician findings      Devices:     PAST MEDICAL HISTORY:  Past Medical History:   Diagnosis Date    A-fib     Anticoagulant long-term use     Atrial flutter 7/30/2022    CHF (congestive heart failure)     Class 1 obesity due to excess calories with serious comorbidity and body mass index (BMI) of 31.0 to 31.9 in adult     Class 1 obesity due to excess calories with serious comorbidity and body mass index (BMI) of 32.0 to 32.9 in adult     Dilated cardiomyopathy 1/10/2018    Disorder of kidney and ureter     CKD    Encounter for blood transfusion     Essential hypertension 8/28/2022    Gout     HTN (hypertension)     Hx of psychiatric care     ICD (implantable cardioverter-defibrillator) infection 7/1/2020    Psychiatric problem     Thyroid disease     Ventricular tachycardia (paroxysmal)        PAST SURGICAL HISTORY:  Past Surgical History:    Procedure Laterality Date    AORTIC VALVULOPLASTY N/A 7/13/2022    Procedure: REPAIR, AORTIC VALVE;  Surgeon: Yg Kaufman MD;  Location: [facility] OR 2ND FLR;  Service: Cardiovascular;  Laterality: N/A;    APPLICATION OF WOUND VACUUM-ASSISTED CLOSURE DEVICE N/A 7/15/2022    Procedure: APPLICATION, WOUND VAC;  Surgeon: Yg Kaufman MD;  Location: [facility] OR 2ND FLR;  Service: Cardiovascular;  Laterality: N/A;  50 x 5 cm     CARDIAC CATHETERIZATION  Dec. 2012    CARDIAC DEFIBRILLATOR PLACEMENT Left     CRRT-D    COLONOSCOPY N/A 3/6/2018    Procedure: COLONOSCOPY;  Surgeon: Alonso Bone MD;  Location: [facility] ENDO (2ND FLR);  Service: Endoscopy;  Laterality: N/A;    COLONOSCOPY N/A 7/17/2019    Procedure: COLONOSCOPY;  Surgeon: Blane Valdez MD;  Location: [facility] ENDO (2ND FLR);  Service: Endoscopy;  Laterality: N/A;    COLONOSCOPY N/A 7/18/2019    Procedure: COLONOSCOPY;  Surgeon: Blane Valdez MD;  Location: [facility] ENDO (2ND FLR);  Service: Endoscopy;  Laterality: N/A;    ESOPHAGOGASTRODUODENOSCOPY N/A 7/17/2019    Procedure: EGD (ESOPHAGOGASTRODUODENOSCOPY);  Surgeon: Blane Valdez MD;  Location: [facility] ENDO (2ND FLR);  Service: Endoscopy;  Laterality: N/A;    ESOPHAGOGASTRODUODENOSCOPY N/A 7/18/2019    Procedure: EGD (ESOPHAGOGASTRODUODENOSCOPY);  Surgeon: Blane Valdez MD;  Location: [facility] ENDO (2ND FLR);  Service: Endoscopy;  Laterality: N/A;    FOREIGN BODY REMOVAL N/A 7/22/2022    Procedure: REMOVAL, FOREIGN BODY;  Surgeon: Yg Kaufman MD;  Location: [facility] OR 2ND FLR;  Service: Cardiovascular;  Laterality: N/A;  LVAD Heartmate 2 drive line removal    INSERTION OF GRAFT TO PERICARDIUM N/A 7/15/2022    Procedure: INSERTION, GRAFT, PERICARDIUM;  Surgeon: Yg Kaufman MD;  Location: [facility] OR 2ND FLR;  Service: Cardiovascular;  Laterality: N/A;    IRRIGATION OF MEDIASTINUM N/A 7/15/2022    Procedure: IRRIGATION, MEDIASTINUM;  Surgeon: Yg Kaufman MD;  Location: [facility] OR [address] [place] [address];  Service: Cardiovascular;  Laterality: N/A;    LYSIS  OF ADHESIONS  7/13/2022    Procedure: LYSIS, ADHESIONS;  Surgeon: Yg Kaufman MD;  [facility];  Service: Cardiovascular;;    NONINVASIVE CARDIAC ELECTROPHYSIOLOGY STUDY N/A 10/18/2019    Procedure: CARDIAC ELECTROPHYSIOLOGY STUDY, NONINVASIVE;  Surgeon: Raz Wagner MD;  Location: [facility] EP LAB;  Service: Cardiology;  Laterality: N/A;  VT, DFTs, MDT CRTD in situ, LVAD, anes, MB, 3098    REPLACEMENT OF IMPLANTABLE CARDIOVERTER-DEFIBRILLATOR (ICD) GENERATOR N/A 3/9/2020    Procedure: REPLACEMENT, ICD GENERATOR;  Surgeon: Harry Yun MD;  Location: [facility] EP LAB;  Service: Cardiology;  Laterality: N/A;  VT, ICD Gen Change and Lead Revision, MDT, MAC, DM,3 Prep    REPLACEMENT OF LEFT VENTRICULAR ASSIST DEVICE (LVAD)  7/13/2022    Procedure: REPLACEMENT, LVAD;  Surgeon: Yg Kaufman MD;  [facility];  Service: Cardiovascular;;    REPLACEMENT OF PUMP N/A 7/13/2022    Procedure: REPLACEMENT, PUMP;  Surgeon: Yg Kaufman MD;  [facility];  Service: Cardiovascular;  Laterality: N/A;  LVAD pump exchange  EXPLANATION OF HEATMATE 2  IMPLANTATION OF HEARTMATE 3  IMPLANTATION OF 8MM CHIMNEY GRAFT TO RFA  INITIATION OF ECMO  TEMPORARY CLOSURE OF CHEST    REVISION OF IMPLANTABLE CARDIOVERTER-DEFIBRILLATOR (ICD) ELECTRODE LEAD PLACEMENT N/A 3/9/2020    Procedure: REVISION, INSERTION, ELECTRODE LEAD, ICD;  Surgeon: Harry Yun MD;  Location: [facility] EP LAB;  Service: Cardiology;  Laterality: N/A;  VT, ICD Gen Change and Lead Revision, MDT, MAC, DM,3 Prep    STERNAL WOUND CLOSURE N/A 7/14/2022    Procedure: CLOSURE, WOUND, STERNUM;  Surgeon: Yg Kaufman MD;  [facility];  Service: Cardiovascular;  Laterality: N/A;  temporary closure  evacuation of hematoma    STERNAL WOUND CLOSURE N/A 7/15/2022    Procedure: CLOSURE, WOUND, STERNUM;  Surgeon: Yg Kaufman MD;  [facility];  Service: Cardiovascular;  Laterality: N/A;    TRACHEOSTOMY N/A  2022    Procedure: CREATION, TRACHEOSTOMY;  Surgeon: Germain Holt MD;  [facility];  Service: General;  Laterality: N/A;    TREATMENT OF CARDIAC ARRHYTHMIA  10/18/2019    Procedure: Cardioversion or Defibrillation;  Surgeon: Raz Wagner MD;  Location: [facility] EP LAB;  Service: Cardiology;;       SOCIAL HISTORY  Social History     Socioeconomic History    Marital status:     Number of children: 3   Occupational History     Employer: [employer]    Tobacco Use    Smoking status: Former     Packs/day: 1.00     Years: 31.00     Pack years: 31.00     Types: Cigarettes     Quit date: 2018     Years since quittin.6    Smokeless tobacco: Never    Tobacco comments:     pt is quiting on his own - pt stated not qualified for program;  pt  quit on his own   Substance and Sexual Activity    Alcohol use: No     Alcohol/week: 0.0 standard drinks     Comment: quit    Drug use: No    Sexual activity: Yes     Partners: Female     Birth control/protection: None     Comment: 10/5/17  with same partner 7 years    Social History Narrative    Disabled       FAMILY HISTORY  Family History   Problem Relation Age of Onset    Hypertension Father     Diabetes Father     Coronary artery disease Father     Heart disease Father         CHF    No Known Problems Mother     Cancer Sister 54        breast CA    No Known Problems Brother     Anxiety disorder Neg Hx     Depression Neg Hx     Dementia Neg Hx     Bipolar disorder Neg Hx     Suicide Neg Hx        ALLERGIES:  Review of patient's allergies indicates:   Allergen Reactions    Lisinopril Anaphylaxis    Hydralazine analogues      Chronic constipation, impotence, dizziness       MEDICATIONS  Current Outpatient Medications on File Prior to Visit   Medication Sig Dispense Refill    ALPRAZolam (XANAX) 1 MG tablet TAKE 1 TABLET BY MOUTH TWICE A DAILY AS NEEDED FOR ANXIETY. 30 tablet 0    amiodarone (PACERONE) 200 MG Tab Take 2 tablets (400  mg total) by mouth once daily. 180 tablet 3    aspirin (ECOTRIN) 81 MG EC tablet Take 1 tablet (81 mg total) by mouth once daily. 30 tablet 11    levothyroxine (SYNTHROID) 50 MCG tablet Take 1 tablet (50 mcg total) by mouth before breakfast. 30 tablet 11    magnesium oxide (MAG-OX) 400 mg (241.3 mg magnesium) tablet Take 1 tablet (400 mg total) by mouth 2 (two) times daily.  0    mexiletine (MEXITIL) 250 MG Cap Take 1 capsule (250 mg total) by mouth every 8 (eight) hours. 90 capsule 3    mirtazapine (REMERON) 30 MG tablet Take 1 tablet (30 mg total) by mouth every evening. 30 tablet 11    omega-3 acid ethyl esters (LOVAZA) 1 gram capsule Take 2 capsules (2 g total) by mouth 2 (two) times daily. 360 capsule 3    pantoprazole (PROTONIX) 40 MG tablet TAKE 1 TABLET (40 MG TOTAL) BY MOUTH DAILY WITH LUNCH. 90 tablet 3    polyethylene glycol (GLYCOLAX) 17 gram PwPk Take 17 g by mouth once daily.  0    torsemide (DEMADEX) 20 MG Tab TAKE 2 TABLETS BY MOUTH ONCE DAILY 180 tablet 3    warfarin (COUMADIN) 5 MG tablet Take 1.5 tablets (7.5 mg total) by mouth Daily. 135 tablet 3    zolpidem (AMBIEN CR) 12.5 MG CR tablet TAKE 1 TABLET (12.5 MG TOTAL) BY MOUTH NIGHTLY AS NEEDED FOR INSOMNIA. 15 tablet 0    [DISCONTINUED] ferrous gluconate (FERGON) 324 MG tablet TAKE 1 TABLET (324 MG TOTAL) BY MOUTH WITH LUNCH. 90 tablet 3    [DISCONTINUED] sildenafiL (VIAGRA) 100 MG tablet Take 1 tablet (100 mg total) by mouth daily as needed for Erectile Dysfunction. 6 tablet 3     Current Facility-Administered Medications on File Prior to Visit   Medication Dose Route Frequency Provider Last Rate Last Admin    [DISCONTINUED] torsemide tablet  20 mg Oral  Generic External Data Provider        [DISCONTINUED] warfarin (COUMADIN) tablet  6 mg Oral  Generic External Data Provider           REVIEW OF SYSTEMS  Review of Systems    PHYSICAL EXAM:    There were no vitals filed for this visit. There is no height or weight on file to calculate  BMI.    GENERAL: Alert, NAD   HEENT: Anicteric sclerae  NECK: JVP not visible above level of clavicle while sitting upright  CARDIOVASCULAR: VAD hum present  PULMONARY: Lungs clear to auscultation bilaterally  ABDOMEN: Soft, nontender, nondistended. Driveline site . No guarding, no rebound, no hepatomegaly  EXTREMITIES: Warm. No clubbing, cyanosis or edema. No pre-sacral edema  NEUROLOGIC: No focal deficits    LABS:    BMP  Lab Results   Component Value Date     09/01/2022    K 4.2 09/01/2022    CL 99 09/01/2022    CO2 31 (H) 09/01/2022    BUN 17 09/01/2022    CREATININE 1.0 09/01/2022    CALCIUM 8.9 09/01/2022    ANIONGAP 7 (L) 09/01/2022    ESTGFRAFRICA 37.6 (A) 07/31/2022    EGFRNONAA 32.5 (A) 07/31/2022       Magnesium   Date Value Ref Range Status   09/01/2022 2.1 1.6 - 2.6 mg/dL Final       Lab Results   Component Value Date    WBC 5.14 08/30/2022    HGB 7.1 (L) 08/30/2022    HCT 23.8 (L) 08/30/2022    MCV 92 08/30/2022     08/30/2022       Lab Results   Component Value Date    INR 2.5 (H) 09/01/2022    INR 2.2 (H) 08/31/2022    INR 1.9 (H) 08/30/2022       BNP   Date Value Ref Range Status   08/31/2022 364 (H) 0 - 99 pg/mL Final     Comment:     Values of less than 100 pg/ml are consistent with non-CHF populations.   08/29/2022 171 (H) 0 - 99 pg/mL Final     Comment:     Values of less than 100 pg/ml are consistent with non-CHF populations.   08/26/2022 210 (H) 0 - 99 pg/mL Final     Comment:     Values of less than 100 pg/ml are consistent with non-CHF populations.       LD   Date Value Ref Range Status   09/01/2022 235 110 - 260 U/L Final     Comment:     Results are increased in hemolyzed samples.   08/31/2022 272 (H) 110 - 260 U/L Final     Comment:     Results are increased in hemolyzed samples.   08/30/2022 285 (H) 110 - 260 U/L Final     Comment:     Results are increased in hemolyzed samples.       IMPRESSION:    NYHA Class II  ACC/AHA Stage D  Chery profile A    1. LVAD (left  ventricular assist device) present    2. Left ventricular assist device (LVAD) complication, subsequent encounter    3. Chronic combined systolic and diastolic heart failure    4. Dilated cardiomyopathy    5. Anticoagulation monitoring, INR range 2-3    6. Amiodarone-induced hyperthyroidism    7. Hypothyroidism, unspecified type    8. VT (ventricular tachycardia)    9. Hypertensive cardiovascular-renal disease, stage 1-4 or unspecified chronic kidney disease, with heart failure    10. Stage 3b chronic kidney disease    11. Iron deficiency anemia due to chronic blood loss    12. Impaired mobility    13. History of COVID-19        PLAN:    Trach is capped at size 8. General surgery pending appointment with Dr. Holt with plans to decannulate next week.   Followup with Endocrine and EP.     Recommend 2 gram sodium restriction and 1500 mL fluid restriction.  Encourage physical activity with graded exercise program.  Requested patient to weigh themselves daily, and to notify us if their weight increases by more than 3 lbs in 1 day or 5 lbs in 1 week.   Pt to call us withmore shortness of breath, swelling or unexpected weight changes, fever, chills, alarms, bloody or black bowel movements, or drainage from the driveline    Additionally, the patient has a patient centered goal of being able to improve their quality of life     F/U 1 month with clinic visit, labs and interrogation    Not listed for Toshia    Short-term goals: improve appetite, get stronger, decannulate trach  Long-term goals: gain muscle mass, improve quality of life, see new [facility]      GRIFFIN Patient Status  Functional Status: 50% - Requires considerable assistance and frequent medical care  Physical Capacity: Limited Mobility  Working for Income: No  If no, reason not working: Demands of Treatment      Electronically signed by:   Roslyn Ragsdale   09/20/2022 1:25 PM

## 2022-09-21 ENCOUNTER — PATIENT MESSAGE (OUTPATIENT)
Dept: CARDIOLOGY | Facility: HOSPITAL | Age: 57
End: 2022-09-21
Payer: COMMERCIAL

## 2022-09-22 ENCOUNTER — OFFICE VISIT (OUTPATIENT)
Dept: CARDIOTHORACIC SURGERY | Facility: CLINIC | Age: 57
End: 2022-09-22
Payer: COMMERCIAL

## 2022-09-22 ENCOUNTER — CLINICAL SUPPORT (OUTPATIENT)
Dept: TRANSPLANT | Facility: CLINIC | Age: 57
End: 2022-09-22
Payer: COMMERCIAL

## 2022-09-22 ENCOUNTER — OFFICE VISIT (OUTPATIENT)
Dept: TRANSPLANT | Facility: CLINIC | Age: 57
End: 2022-09-22
Payer: COMMERCIAL

## 2022-09-22 ENCOUNTER — HOSPITAL ENCOUNTER (OUTPATIENT)
Dept: RADIOLOGY | Facility: HOSPITAL | Age: 57
Discharge: HOME | DRG: 270 | End: 2022-09-22
Attending: THORACIC SURGERY (CARDIOTHORACIC VASCULAR SURGERY)
Payer: COMMERCIAL

## 2022-09-22 VITALS — WEIGHT: 190 LBS | TEMPERATURE: 98 F | HEIGHT: 73 IN | BODY MASS INDEX: 25.18 KG/M2 | SYSTOLIC BLOOD PRESSURE: 90 MMHG

## 2022-09-22 DIAGNOSIS — E03.9 HYPOTHYROIDISM, UNSPECIFIED TYPE: ICD-10-CM

## 2022-09-22 DIAGNOSIS — I47.20 VT (VENTRICULAR TACHYCARDIA): ICD-10-CM

## 2022-09-22 DIAGNOSIS — Z86.16 HISTORY OF COVID-19: ICD-10-CM

## 2022-09-22 DIAGNOSIS — Z74.09 IMPAIRED MOBILITY: ICD-10-CM

## 2022-09-22 DIAGNOSIS — Z79.01 ANTICOAGULATION MONITORING, INR RANGE 2-3: ICD-10-CM

## 2022-09-22 DIAGNOSIS — I13.0 HYPERTENSIVE CARDIOVASCULAR-RENAL DISEASE, STAGE 1-4 OR UNSPECIFIED CHRONIC KIDNEY DISEASE, WITH HEART FAILURE: ICD-10-CM

## 2022-09-22 DIAGNOSIS — N18.32 STAGE 3B CHRONIC KIDNEY DISEASE: ICD-10-CM

## 2022-09-22 DIAGNOSIS — T46.2X5A AMIODARONE-INDUCED HYPERTHYROIDISM: ICD-10-CM

## 2022-09-22 DIAGNOSIS — I50.42 CHRONIC COMBINED SYSTOLIC AND DIASTOLIC HEART FAILURE: Chronic | ICD-10-CM

## 2022-09-22 DIAGNOSIS — T82.9XXD LEFT VENTRICULAR ASSIST DEVICE (LVAD) COMPLICATION, SUBSEQUENT ENCOUNTER: ICD-10-CM

## 2022-09-22 DIAGNOSIS — D50.0 IRON DEFICIENCY ANEMIA DUE TO CHRONIC BLOOD LOSS: ICD-10-CM

## 2022-09-22 DIAGNOSIS — Z95.811 LVAD (LEFT VENTRICULAR ASSIST DEVICE) PRESENT: ICD-10-CM

## 2022-09-22 DIAGNOSIS — Z95.811 LVAD (LEFT VENTRICULAR ASSIST DEVICE) PRESENT: Primary | ICD-10-CM

## 2022-09-22 DIAGNOSIS — I42.0 DILATED CARDIOMYOPATHY: ICD-10-CM

## 2022-09-22 DIAGNOSIS — E05.80 AMIODARONE-INDUCED HYPERTHYROIDISM: ICD-10-CM

## 2022-09-22 DIAGNOSIS — Z95.811 LVAD (LEFT VENTRICULAR ASSIST DEVICE) PRESENT: Primary | Chronic | ICD-10-CM

## 2022-09-22 PROBLEM — E46 PROTEIN CALORIE MALNUTRITION: Status: ACTIVE | Noted: 2022-07-18

## 2022-09-22 PROCEDURE — 71046 X-RAY EXAM CHEST 2 VIEWS: CPT | Mod: 26,,, | Performed by: RADIOLOGY

## 2022-09-22 PROCEDURE — 1160F PR REVIEW ALL MEDS BY PRESCRIBER/CLIN PHARMACIST DOCUMENTED: ICD-10-PCS | Mod: CPTII,S$GLB,, | Performed by: INTERNAL MEDICINE

## 2022-09-22 PROCEDURE — 99214 OFFICE O/P EST MOD 30 MIN: CPT | Mod: S$GLB,,, | Performed by: INTERNAL MEDICINE

## 2022-09-22 PROCEDURE — 71046 X-RAY EXAM CHEST 2 VIEWS: CPT | Mod: TC,FY

## 2022-09-22 PROCEDURE — 93750 PR INTERROGATE VENT ASSIST DEV, IN PERSON, W PHYSICIAN ANALYSIS: ICD-10-PCS | Mod: S$GLB,,, | Performed by: INTERNAL MEDICINE

## 2022-09-22 PROCEDURE — 99999 PR PBB SHADOW E&M-EST. PATIENT-LVL I: CPT | Mod: PBBFAC,,,

## 2022-09-22 PROCEDURE — 99999 PR PBB SHADOW E&M-EST. PATIENT-LVL I: ICD-10-PCS | Mod: PBBFAC,,, | Performed by: NURSE PRACTITIONER

## 2022-09-22 PROCEDURE — 99999 PR PBB SHADOW E&M-EST. PATIENT-LVL III: ICD-10-PCS | Mod: PBBFAC,,, | Performed by: INTERNAL MEDICINE

## 2022-09-22 PROCEDURE — 1159F PR MEDICATION LIST DOCUMENTED IN MEDICAL RECORD: ICD-10-PCS | Mod: CPTII,S$GLB,, | Performed by: INTERNAL MEDICINE

## 2022-09-22 PROCEDURE — 3008F PR BODY MASS INDEX (BMI) DOCUMENTED: ICD-10-PCS | Mod: CPTII,S$GLB,, | Performed by: INTERNAL MEDICINE

## 2022-09-22 PROCEDURE — 1159F MED LIST DOCD IN RCRD: CPT | Mod: CPTII,S$GLB,, | Performed by: INTERNAL MEDICINE

## 2022-09-22 PROCEDURE — 99999 PR PBB SHADOW E&M-EST. PATIENT-LVL III: CPT | Mod: PBBFAC,,, | Performed by: INTERNAL MEDICINE

## 2022-09-22 PROCEDURE — 1160F RVW MEDS BY RX/DR IN RCRD: CPT | Mod: CPTII,S$GLB,, | Performed by: INTERNAL MEDICINE

## 2022-09-22 PROCEDURE — 1111F PR DISCHARGE MEDS RECONCILED W/ CURRENT OUTPATIENT MED LIST: ICD-10-PCS | Mod: CPTII,S$GLB,, | Performed by: INTERNAL MEDICINE

## 2022-09-22 PROCEDURE — 99024 POSTOP FOLLOW-UP VISIT: CPT | Mod: S$GLB,,, | Performed by: NURSE PRACTITIONER

## 2022-09-22 PROCEDURE — 1111F DSCHRG MED/CURRENT MED MERGE: CPT | Mod: CPTII,S$GLB,, | Performed by: INTERNAL MEDICINE

## 2022-09-22 PROCEDURE — 99214 PR OFFICE/OUTPT VISIT, EST, LEVL IV, 30-39 MIN: ICD-10-PCS | Mod: S$GLB,,, | Performed by: INTERNAL MEDICINE

## 2022-09-22 PROCEDURE — 93750 INTERROGATION VAD IN PERSON: CPT | Mod: S$GLB,,, | Performed by: INTERNAL MEDICINE

## 2022-09-22 PROCEDURE — 99999 PR PBB SHADOW E&M-EST. PATIENT-LVL I: CPT | Mod: PBBFAC,,, | Performed by: NURSE PRACTITIONER

## 2022-09-22 PROCEDURE — 71046 XR CHEST PA AND LATERAL: ICD-10-PCS | Mod: 26,,, | Performed by: RADIOLOGY

## 2022-09-22 PROCEDURE — 3008F BODY MASS INDEX DOCD: CPT | Mod: CPTII,S$GLB,, | Performed by: INTERNAL MEDICINE

## 2022-09-22 PROCEDURE — 99999 PR PBB SHADOW E&M-EST. PATIENT-LVL I: ICD-10-PCS | Mod: PBBFAC,,,

## 2022-09-22 PROCEDURE — 99024 PR POST-OP FOLLOW-UP VISIT: ICD-10-PCS | Mod: S$GLB,,, | Performed by: NURSE PRACTITIONER

## 2022-09-22 RX ORDER — LEVOTHYROXINE SODIUM 50 UG/1
50 TABLET ORAL
Qty: 30 TABLET | Refills: 11 | Status: ON HOLD
Start: 2022-09-22 | End: 2022-10-05 | Stop reason: SDUPTHER

## 2022-09-22 RX ORDER — MIRTAZAPINE 30 MG/1
30 TABLET, FILM COATED ORAL NIGHTLY
Qty: 30 TABLET | Refills: 11
Start: 2022-09-22 | End: 2022-10-13 | Stop reason: SDUPTHER

## 2022-09-22 RX ORDER — LANOLIN ALCOHOL/MO/W.PET/CERES
400 CREAM (GRAM) TOPICAL 2 TIMES DAILY
Refills: 0
Start: 2022-09-22 | End: 2022-11-07 | Stop reason: SDUPTHER

## 2022-09-22 RX ORDER — AMIODARONE HYDROCHLORIDE 200 MG/1
400 TABLET ORAL DAILY
Qty: 180 TABLET | Refills: 3 | Status: SHIPPED | OUTPATIENT
Start: 2022-09-22 | End: 2022-10-28 | Stop reason: SDUPTHER

## 2022-09-22 RX ORDER — OMEGA-3-ACID ETHYL ESTERS 1 G/1
2 CAPSULE, LIQUID FILLED ORAL 2 TIMES DAILY
Qty: 360 CAPSULE | Refills: 3 | Status: ON HOLD
Start: 2022-09-22 | End: 2022-10-05 | Stop reason: SDUPTHER

## 2022-09-22 RX ORDER — ASPIRIN 81 MG/1
81 TABLET ORAL DAILY
Qty: 30 TABLET | Refills: 11 | Status: ON HOLD | OUTPATIENT
Start: 2022-09-22 | End: 2022-10-05 | Stop reason: HOSPADM

## 2022-09-22 RX ORDER — PANTOPRAZOLE SODIUM 40 MG/1
40 TABLET, DELAYED RELEASE ORAL
Qty: 90 TABLET | Refills: 3 | Status: SHIPPED | OUTPATIENT
Start: 2022-09-22 | End: 2022-10-28 | Stop reason: SDUPTHER

## 2022-09-22 RX ORDER — DOXYCYCLINE 100 MG/1
100 CAPSULE ORAL 2 TIMES DAILY
Qty: 20 CAPSULE | Refills: 0 | Status: ON HOLD | OUTPATIENT
Start: 2022-09-22 | End: 2022-10-05 | Stop reason: HOSPADM

## 2022-09-22 RX ORDER — WARFARIN SODIUM 5 MG/1
7.5 TABLET ORAL DAILY
Qty: 135 TABLET | Refills: 3 | Status: ON HOLD
Start: 2022-09-22 | End: 2022-10-05 | Stop reason: SDUPTHER

## 2022-09-22 NOTE — LETTER
September 22, 2022        Nader Chiu  [address]  [address]  [place] [address]  Phone: [phone]  Fax: [phone]     Nader Chiu  [address]  [address]  [place] [address]  Phone: [phone]  Fax: [phone]             [facility]  [address]  [place] [address]  Phone: [phone]   Patient: Tim Richards   MR Number: [id number]   YOB: 1965   Date of Visit: 9/22/2022       Dear Dr. Nader Chiu, Nader Chiu    Thank you for referring Tim Richards to me for evaluation. Attached you will find relevant portions of my assessment and plan of care.    If you have questions, please do not hesitate to call me. I look forward to following Tim Richards along with you.    Sincerely,    Roslyn Ragsdale, DO    Enclosure    If you would like to receive this communication electronically, please contact [email or web address] or [phone] to request [employer] Link access.    [employer] Link is a tool which provides read-only access to select patient information with whom you have a relationship. Its easy to use and provides real time access to review your patients record including encounter summaries, notes, results, and demographic information.    If you feel you have received this communication in error or would no longer like to receive these types of communications, please e-mail [email or web address]

## 2022-09-22 NOTE — PROGRESS NOTES
Patient seen and examined. Patient is progressively increasing activity. No significant complaints.  Old drive line site well healing, no need for packing.  Right groin with pus in the cannulation site. Continue to pack and take antibiotics as prescribed      Sternum: stable, incision CDI  Chest xray: Acceptable post op chest  EKG: NSR     Assessment:  S/P LVAD     Plan:  Continue to follow up with HTS        No scheduled appointment, RTC prn
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
## 2022-09-23 ENCOUNTER — ANTI-COAG VISIT (OUTPATIENT)
Dept: CARDIOLOGY | Facility: CLINIC | Age: 57
End: 2022-09-23
Payer: COMMERCIAL

## 2022-09-23 DIAGNOSIS — I42.8 NICM (NONISCHEMIC CARDIOMYOPATHY): Primary | ICD-10-CM

## 2022-09-23 PROCEDURE — G0180 MD CERTIFICATION HHA PATIENT: HCPCS | Mod: ,,, | Performed by: STUDENT IN AN ORGANIZED HEALTH CARE EDUCATION/TRAINING PROGRAM

## 2022-09-23 PROCEDURE — G0180 PR HOME HEALTH MD CERTIFICATION: ICD-10-PCS | Mod: ,,, | Performed by: STUDENT IN AN ORGANIZED HEALTH CARE EDUCATION/TRAINING PROGRAM

## 2022-09-23 PROCEDURE — 93793 ANTICOAG MGMT PT WARFARIN: CPT | Mod: S$GLB,,,

## 2022-09-23 PROCEDURE — 93793 PR ANTICOAGULANT MGMT FOR PT TAKING WARFARIN: ICD-10-PCS | Mod: S$GLB,,,

## 2022-09-24 ENCOUNTER — HOSPITAL ENCOUNTER (INPATIENT)
Facility: HOSPITAL | Age: 57
LOS: 11 days | Discharge: HOME | DRG: 270 | End: 2022-10-05
Attending: EMERGENCY MEDICINE | Admitting: INTERNAL MEDICINE
Payer: COMMERCIAL

## 2022-09-24 DIAGNOSIS — I72.9 MYCOTIC ANEURYSM: ICD-10-CM

## 2022-09-24 DIAGNOSIS — Z79.01 ANTICOAGULATION MONITORING, INR RANGE 2-3: ICD-10-CM

## 2022-09-24 DIAGNOSIS — E03.9 HYPOTHYROIDISM, UNSPECIFIED TYPE: ICD-10-CM

## 2022-09-24 DIAGNOSIS — Z95.811 LVAD (LEFT VENTRICULAR ASSIST DEVICE) PRESENT: Chronic | ICD-10-CM

## 2022-09-24 DIAGNOSIS — I72.4 PSEUDOANEURYSM OF RIGHT FEMORAL ARTERY: Primary | ICD-10-CM

## 2022-09-24 DIAGNOSIS — I10 PRIMARY HYPERTENSION: Chronic | ICD-10-CM

## 2022-09-24 DIAGNOSIS — I50.42 CHRONIC COMBINED SYSTOLIC AND DIASTOLIC CONGESTIVE HEART FAILURE: ICD-10-CM

## 2022-09-24 DIAGNOSIS — Z78.9 ADMITTED TO INTENSIVE CARE UNIT: ICD-10-CM

## 2022-09-24 DIAGNOSIS — I50.42 CHRONIC COMBINED SYSTOLIC AND DIASTOLIC HEART FAILURE: Chronic | ICD-10-CM

## 2022-09-24 DIAGNOSIS — Z87.898 NO POST-OP COMPLICATIONS: ICD-10-CM

## 2022-09-24 DIAGNOSIS — I47.20 VT (VENTRICULAR TACHYCARDIA): ICD-10-CM

## 2022-09-24 DIAGNOSIS — I72.4 PSEUDOANEURYSM OF FEMORAL ARTERY: ICD-10-CM

## 2022-09-24 DIAGNOSIS — R53.1 WEAKNESS: ICD-10-CM

## 2022-09-24 PROBLEM — S30.1XXA GROIN HEMATOMA: Status: ACTIVE | Noted: 2022-09-24

## 2022-09-24 LAB
ABO + RH BLD: NORMAL
ALBUMIN SERPL BCP-MCNC: 2.6 G/DL (ref 3.5–5.2)
ALP SERPL-CCNC: 94 U/L (ref 55–135)
ALT SERPL W/O P-5'-P-CCNC: 20 U/L (ref 10–44)
ANION GAP SERPL CALC-SCNC: 11 MMOL/L (ref 8–16)
AST SERPL-CCNC: 31 U/L (ref 10–40)
BASOPHILS # BLD AUTO: 0.02 K/UL (ref 0–0.2)
BASOPHILS NFR BLD: 0.3 % (ref 0–1.9)
BILIRUB SERPL-MCNC: 0.4 MG/DL (ref 0.1–1)
BLD GP AB SCN CELLS X3 SERPL QL: NORMAL
BNP SERPL-MCNC: 229 PG/ML (ref 0–99)
BUN SERPL-MCNC: 11 MG/DL (ref 6–20)
BUN SERPL-MCNC: 11 MG/DL (ref 6–30)
CALCIUM SERPL-MCNC: 8.9 MG/DL (ref 8.7–10.5)
CHLORIDE SERPL-SCNC: 105 MMOL/L (ref 95–110)
CHLORIDE SERPL-SCNC: 106 MMOL/L (ref 95–110)
CO2 SERPL-SCNC: 22 MMOL/L (ref 23–29)
CREAT SERPL-MCNC: 1.6 MG/DL (ref 0.5–1.4)
CREAT SERPL-MCNC: 1.7 MG/DL (ref 0.5–1.4)
DIFFERENTIAL METHOD: ABNORMAL
EOSINOPHIL # BLD AUTO: 0.2 K/UL (ref 0–0.5)
EOSINOPHIL NFR BLD: 4 % (ref 0–8)
ERYTHROCYTE [DISTWIDTH] IN BLOOD BY AUTOMATED COUNT: 15.6 % (ref 11.5–14.5)
EST. GFR  (NO RACE VARIABLE): 50.6 ML/MIN/1.73 M^2
GLUCOSE SERPL-MCNC: 79 MG/DL (ref 70–110)
GLUCOSE SERPL-MCNC: 85 MG/DL (ref 70–110)
HCT VFR BLD AUTO: 31.7 % (ref 40–54)
HCT VFR BLD CALC: 28 %PCV (ref 36–54)
HGB BLD-MCNC: 8.9 G/DL (ref 14–18)
IMM GRANULOCYTES # BLD AUTO: 0.02 K/UL (ref 0–0.04)
IMM GRANULOCYTES NFR BLD AUTO: 0.3 % (ref 0–0.5)
INR PPP: 3 (ref 0.8–1.2)
LYMPHOCYTES # BLD AUTO: 1.4 K/UL (ref 1–4.8)
LYMPHOCYTES NFR BLD: 24 % (ref 18–48)
MCH RBC QN AUTO: 25.6 PG (ref 27–31)
MCHC RBC AUTO-ENTMCNC: 28.1 G/DL (ref 32–36)
MCV RBC AUTO: 91 FL (ref 82–98)
MONOCYTES # BLD AUTO: 0.8 K/UL (ref 0.3–1)
MONOCYTES NFR BLD: 13.2 % (ref 4–15)
NEUTROPHILS # BLD AUTO: 3.3 K/UL (ref 1.8–7.7)
NEUTROPHILS NFR BLD: 58.2 % (ref 38–73)
NRBC BLD-RTO: 0 /100 WBC
PLATELET # BLD AUTO: 335 K/UL (ref 150–450)
PMV BLD AUTO: 9.6 FL (ref 9.2–12.9)
POC IONIZED CALCIUM: 1.09 MMOL/L (ref 1.06–1.42)
POC PTINR: 3.1 (ref 0.9–1.2)
POC PTWBT: 35.5 SEC (ref 9.7–14.3)
POC TCO2 (MEASURED): 25 MMOL/L (ref 23–29)
POTASSIUM BLD-SCNC: 4.4 MMOL/L (ref 3.5–5.1)
POTASSIUM SERPL-SCNC: 4.3 MMOL/L (ref 3.5–5.1)
PROT SERPL-MCNC: 6.5 G/DL (ref 6–8.4)
PROTHROMBIN TIME: 29.9 SEC (ref 9–12.5)
RBC # BLD AUTO: 3.47 M/UL (ref 4.6–6.2)
SAMPLE: ABNORMAL
SAMPLE: ABNORMAL
SODIUM BLD-SCNC: 139 MMOL/L (ref 136–145)
SODIUM SERPL-SCNC: 139 MMOL/L (ref 136–145)
TROPONIN I SERPL DL<=0.01 NG/ML-MCNC: 0.57 NG/ML (ref 0–0.03)
WBC # BLD AUTO: 5.75 K/UL (ref 3.9–12.7)

## 2022-09-24 PROCEDURE — 86920 COMPATIBILITY TEST SPIN: CPT | Performed by: STUDENT IN AN ORGANIZED HEALTH CARE EDUCATION/TRAINING PROGRAM

## 2022-09-24 PROCEDURE — 86901 BLOOD TYPING SEROLOGIC RH(D): CPT | Performed by: EMERGENCY MEDICINE

## 2022-09-24 PROCEDURE — 25000003 PHARM REV CODE 250: Performed by: STUDENT IN AN ORGANIZED HEALTH CARE EDUCATION/TRAINING PROGRAM

## 2022-09-24 PROCEDURE — 86920 COMPATIBILITY TEST SPIN: CPT

## 2022-09-24 PROCEDURE — 99284 PR EMERGENCY DEPT VISIT,LEVEL IV: ICD-10-PCS | Mod: ,,, | Performed by: EMERGENCY MEDICINE

## 2022-09-24 PROCEDURE — 83880 ASSAY OF NATRIURETIC PEPTIDE: CPT | Performed by: EMERGENCY MEDICINE

## 2022-09-24 PROCEDURE — 85610 PROTHROMBIN TIME: CPT | Performed by: EMERGENCY MEDICINE

## 2022-09-24 PROCEDURE — 86920 COMPATIBILITY TEST SPIN: CPT | Performed by: NURSE PRACTITIONER

## 2022-09-24 PROCEDURE — 80047 BASIC METABLC PNL IONIZED CA: CPT

## 2022-09-24 PROCEDURE — 85025 COMPLETE CBC W/AUTO DIFF WBC: CPT | Performed by: EMERGENCY MEDICINE

## 2022-09-24 PROCEDURE — 99900035 HC TECH TIME PER 15 MIN (STAT)

## 2022-09-24 PROCEDURE — 27201040 HC RC 50 FILTER: Mod: 91 | Performed by: EMERGENCY MEDICINE

## 2022-09-24 PROCEDURE — 99284 EMERGENCY DEPT VISIT MOD MDM: CPT | Mod: ,,, | Performed by: EMERGENCY MEDICINE

## 2022-09-24 PROCEDURE — 82803 BLOOD GASES ANY COMBINATION: CPT

## 2022-09-24 PROCEDURE — 85610 PROTHROMBIN TIME: CPT

## 2022-09-24 PROCEDURE — 99285 EMERGENCY DEPT VISIT HI MDM: CPT | Mod: 25

## 2022-09-24 PROCEDURE — 80053 COMPREHEN METABOLIC PANEL: CPT | Performed by: EMERGENCY MEDICINE

## 2022-09-24 PROCEDURE — 25500020 PHARM REV CODE 255: Performed by: EMERGENCY MEDICINE

## 2022-09-24 PROCEDURE — 20600001 HC STEP DOWN PRIVATE ROOM

## 2022-09-24 PROCEDURE — 84484 ASSAY OF TROPONIN QUANT: CPT | Performed by: EMERGENCY MEDICINE

## 2022-09-24 RX ORDER — PANTOPRAZOLE SODIUM 40 MG/1
40 TABLET, DELAYED RELEASE ORAL
Status: DISCONTINUED | OUTPATIENT
Start: 2022-09-25 | End: 2022-10-05 | Stop reason: HOSPADM

## 2022-09-24 RX ORDER — ALPRAZOLAM 0.5 MG/1
1 TABLET ORAL 2 TIMES DAILY PRN
Status: DISCONTINUED | OUTPATIENT
Start: 2022-09-24 | End: 2022-10-05 | Stop reason: HOSPADM

## 2022-09-24 RX ORDER — TORSEMIDE 20 MG/1
40 TABLET ORAL DAILY
Status: DISCONTINUED | OUTPATIENT
Start: 2022-09-25 | End: 2022-09-24

## 2022-09-24 RX ORDER — LEVOTHYROXINE SODIUM 50 UG/1
50 TABLET ORAL
Status: DISCONTINUED | OUTPATIENT
Start: 2022-09-25 | End: 2022-10-05 | Stop reason: HOSPADM

## 2022-09-24 RX ORDER — HYDRALAZINE HYDROCHLORIDE 20 MG/ML
10 INJECTION INTRAMUSCULAR; INTRAVENOUS EVERY 4 HOURS PRN
Status: DISCONTINUED | OUTPATIENT
Start: 2022-09-25 | End: 2022-09-26

## 2022-09-24 RX ORDER — MIRTAZAPINE 15 MG/1
30 TABLET, FILM COATED ORAL NIGHTLY
Status: DISCONTINUED | OUTPATIENT
Start: 2022-09-24 | End: 2022-10-05 | Stop reason: HOSPADM

## 2022-09-24 RX ORDER — ZOLPIDEM TARTRATE 5 MG/1
5 TABLET ORAL NIGHTLY PRN
Refills: 0 | Status: DISCONTINUED | OUTPATIENT
Start: 2022-09-24 | End: 2022-10-05 | Stop reason: HOSPADM

## 2022-09-24 RX ORDER — DOXYCYCLINE HYCLATE 100 MG
100 TABLET ORAL EVERY 12 HOURS
Refills: 0 | Status: DISCONTINUED | OUTPATIENT
Start: 2022-09-24 | End: 2022-09-25

## 2022-09-24 RX ORDER — LANOLIN ALCOHOL/MO/W.PET/CERES
400 CREAM (GRAM) TOPICAL 2 TIMES DAILY
Status: DISCONTINUED | OUTPATIENT
Start: 2022-09-24 | End: 2022-09-25

## 2022-09-24 RX ORDER — AMIODARONE HYDROCHLORIDE 200 MG/1
400 TABLET ORAL DAILY
Status: DISCONTINUED | OUTPATIENT
Start: 2022-09-25 | End: 2022-10-05 | Stop reason: HOSPADM

## 2022-09-24 RX ORDER — MEXILETINE HYDROCHLORIDE 250 MG/1
250 CAPSULE ORAL EVERY 8 HOURS
Status: DISCONTINUED | OUTPATIENT
Start: 2022-09-24 | End: 2022-10-05 | Stop reason: HOSPADM

## 2022-09-24 RX ORDER — TORSEMIDE 20 MG/1
20 TABLET ORAL DAILY
Status: DISCONTINUED | OUTPATIENT
Start: 2022-09-25 | End: 2022-09-25

## 2022-09-24 RX ORDER — POLYETHYLENE GLYCOL 3350 17 G/17G
17 POWDER, FOR SOLUTION ORAL DAILY
Status: DISCONTINUED | OUTPATIENT
Start: 2022-09-25 | End: 2022-10-05 | Stop reason: HOSPADM

## 2022-09-24 RX ADMIN — Medication 400 MG: at 10:09

## 2022-09-24 RX ADMIN — IOHEXOL 125 ML: 350 INJECTION, SOLUTION INTRAVENOUS at 06:09

## 2022-09-24 RX ADMIN — DOXYCYCLINE HYCLATE 100 MG: 100 TABLET, COATED ORAL at 08:09

## 2022-09-24 RX ADMIN — MEXILETINE HYDROCHLORIDE 250 MG: 250 CAPSULE ORAL at 10:09

## 2022-09-24 RX ADMIN — ALPRAZOLAM 1 MG: 0.5 TABLET ORAL at 10:09

## 2022-09-24 RX ADMIN — MIRTAZAPINE 30 MG: 30 TABLET, FILM COATED ORAL at 08:09

## 2022-09-24 NOTE — ASSESSMENT & PLAN NOTE
Procedure: Device Interrogation Including analysis of device parameters  Current Settings: Ventricular Assist Device  Review of device function is stable/unstable stable    TXP LVAD INTERROGATIONS 9/24/2022 9/22/2022 9/1/2022 9/1/2022 9/1/2022 8/31/2022 8/31/2022   Type HeartMate3 - HeartMate3 HeartMate3 HeartMate3 HeartMate3 HeartMate3   Flow 4.5 - 5.8 5.6 5.5 5.6 5.4   Speed 6300 - 6300 6300 6300 6300 6300   PI 4.6 - 1.5 1.9 2.9 2.8 3.0   Power ([place]) 5.2 - 5.4 5.3 5.4 5.3 5.2   LSL - - 5900 - - - -   Low Flow Alarm - - - - no no no   High Power Alarm - - - - - - -   Pulsatility - No Pulse - - Intermittent pulse - -

## 2022-09-24 NOTE — ED NOTES
Contacted LVAD coordinator, Bridget, to notify her of patient's arrival to ED. Verbalized understanding

## 2022-09-24 NOTE — ED PROVIDER NOTES
Encounter Date: 9/24/2022       History     Chief Complaint   Patient presents with    Coagulation Disorder     LVAD from home, recent ecmo site dehisced and bleeding out at home; EMS arrived to ED holding pressure at right femoral site.      HPI    57 Y/O M with Hx of stage D HFrEF (EF 10%) due to NICM underwent HM2 implant 3/8/18 and exchange of outflow graft 3/9/18, Hx VT/VF s/p SICD 2014 . admitted on 6/27 with acute respiratory failure secondary to COVID complicated with LVAD pump thrombosis that was refractory to medical therapy (failed integrillin). Underwent LVAD pump exchange with HM3 on 7/13. Post-op course complicated with worsening cardiogenic shock/RV failure requiring VA-ECMO.  He ultimately improved and was discharged.  Patient reports he felt okay discharge.    He endorses today, he was sitting cross-legged, process leg set up and then started spontaneously bleeding from his right groin.  Never had anything like this before.  EMS arrived they noted moderate amount of blood loss on scene.  Improved with pressure.  Brought patient to the ER for further evaluation.    Review of patient's allergies indicates:   Allergen Reactions    Lisinopril Anaphylaxis    Hydralazine analogues      Chronic constipation, impotence, dizziness     Past Medical History:   Diagnosis Date    A-fib     Anticoagulant long-term use     Atrial flutter 7/30/2022    CHF (congestive heart failure)     Class 1 obesity due to excess calories with serious comorbidity and body mass index (BMI) of 31.0 to 31.9 in adult     Class 1 obesity due to excess calories with serious comorbidity and body mass index (BMI) of 32.0 to 32.9 in adult     Dilated cardiomyopathy 1/10/2018    Disorder of kidney and ureter     CKD    Encounter for blood transfusion     Essential hypertension 8/28/2022    Gout     HTN (hypertension)     Hx of psychiatric care     ICD (implantable cardioverter-defibrillator) infection 7/1/2020    Psychiatric problem      Thyroid disease     Ventricular tachycardia (paroxysmal)      Past Surgical History:   Procedure Laterality Date    AORTIC VALVULOPLASTY N/A 7/13/2022    Procedure: REPAIR, AORTIC VALVE;  Surgeon: Yg Kaufman MD;  Location: [facility] OR 2ND FLR;  Service: Cardiovascular;  Laterality: N/A;    APPLICATION OF WOUND VACUUM-ASSISTED CLOSURE DEVICE N/A 7/15/2022    Procedure: APPLICATION, WOUND VAC;  Surgeon: Yg Kaufman MD;  Location: [facility] OR 2ND FLR;  Service: Cardiovascular;  Laterality: N/A;  50 x 5 cm     CARDIAC CATHETERIZATION  Dec. 2012    CARDIAC DEFIBRILLATOR PLACEMENT Left     CRRT-D    COLONOSCOPY N/A 3/6/2018    Procedure: COLONOSCOPY;  Surgeon: Alonso Bone MD;  Location: [facility] ENDO (2ND FLR);  Service: Endoscopy;  Laterality: N/A;    COLONOSCOPY N/A 7/17/2019    Procedure: COLONOSCOPY;  Surgeon: Blane Valdez MD;  Location: [facility] ENDO (2ND FLR);  Service: Endoscopy;  Laterality: N/A;    COLONOSCOPY N/A 7/18/2019    Procedure: COLONOSCOPY;  Surgeon: Blane Valdez MD;  Location: [facility] ENDO (2ND FLR);  Service: Endoscopy;  Laterality: N/A;    ESOPHAGOGASTRODUODENOSCOPY N/A 7/17/2019    Procedure: EGD (ESOPHAGOGASTRODUODENOSCOPY);  Surgeon: Blane Valdez MD;  Location: [facility] ENDO (2ND FLR);  Service: Endoscopy;  Laterality: N/A;    ESOPHAGOGASTRODUODENOSCOPY N/A 7/18/2019    Procedure: EGD (ESOPHAGOGASTRODUODENOSCOPY);  Surgeon: Blane Valdez MD;  Location: [facility] ENDO (2ND FLR);  Service: Endoscopy;  Laterality: N/A;    FOREIGN BODY REMOVAL N/A 7/22/2022    Procedure: REMOVAL, FOREIGN BODY;  Surgeon: Yg Kaufman MD;  Location: [facility] OR 2ND FLR;  Service: Cardiovascular;  Laterality: N/A;  LVAD Heartmate 2 drive line removal    INSERTION OF GRAFT TO PERICARDIUM N/A 7/15/2022    Procedure: INSERTION, GRAFT, PERICARDIUM;  Surgeon: Yg Kaufman MD;  Location: [facility] OR 2ND FLR;  Service: Cardiovascular;  Laterality: N/A;    IRRIGATION OF MEDIASTINUM N/A 7/15/2022    Procedure: IRRIGATION, MEDIASTINUM;  Surgeon: Yg Kaufman,  MD;  [address]  Service: Cardiovascular;  Laterality: N/A;    LYSIS OF ADHESIONS  7/13/2022    Procedure: LYSIS, ADHESIONS;  Surgeon: Yg Kaufman MD;  [address];  Service: Cardiovascular;;    NONINVASIVE CARDIAC ELECTROPHYSIOLOGY STUDY N/A 10/18/2019    Procedure: CARDIAC ELECTROPHYSIOLOGY STUDY, NONINVASIVE;  Surgeon: Raz Wagner MD;  Location: [facility] EP LAB;  Service: Cardiology;  Laterality: N/A;  VT, DFTs, MDT CRTD in situ, LVAD, anes, MB, 3098    REPLACEMENT OF IMPLANTABLE CARDIOVERTER-DEFIBRILLATOR (ICD) GENERATOR N/A 3/9/2020    Procedure: REPLACEMENT, ICD GENERATOR;  Surgeon: Harry Yun MD;  Location: [facility] EP LAB;  Service: Cardiology;  Laterality: N/A;  VT, ICD Gen Change and Lead Revision, MDT, MAC, DM,3 Prep    REPLACEMENT OF LEFT VENTRICULAR ASSIST DEVICE (LVAD)  7/13/2022    Procedure: REPLACEMENT, LVAD;  Surgeon: Yg Kaufman MD;  Location: [address];  Service: Cardiovascular;;    REPLACEMENT OF PUMP N/A 7/13/2022    Procedure: REPLACEMENT, PUMP;  Surgeon: Yg Kaufman MD;  Location: [address];  Service: Cardiovascular;  Laterality: N/A;  LVAD pump exchange  EXPLANATION OF HEATMATE 2  IMPLANTATION OF HEARTMATE 3  IMPLANTATION OF 8MM CHIMNEY GRAFT TO RFA  INITIATION OF ECMO  TEMPORARY CLOSURE OF CHEST    REVISION OF IMPLANTABLE CARDIOVERTER-DEFIBRILLATOR (ICD) ELECTRODE LEAD PLACEMENT N/A 3/9/2020    Procedure: REVISION, INSERTION, ELECTRODE LEAD, ICD;  Surgeon: Harry Yun MD;  Location: [facility] EP LAB;  Service: Cardiology;  Laterality: N/A;  VT, ICD Gen Change and Lead Revision, MDT, MAC, DM,3 Prep    STERNAL WOUND CLOSURE N/A 7/14/2022    Procedure: CLOSURE, WOUND, STERNUM;  Surgeon: Yg Kaufman MD;  [facility];  Service: Cardiovascular;  Laterality: N/A;  temporary closure  evacuation of hematoma    STERNAL WOUND CLOSURE N/A 7/15/2022    Procedure: CLOSURE, WOUND, STERNUM;  Surgeon: Yg Kaufman MD;  Location:  [facility] OR 2ND FLR;  Service: Cardiovascular;  Laterality: N/A;    TRACHEOSTOMY N/A 2022    Procedure: CREATION, TRACHEOSTOMY;  Surgeon: Germain Holt MD;  Location: [facility] OR [facility] FLR;  Service: General;  Laterality: N/A;    TREATMENT OF CARDIAC ARRHYTHMIA  10/18/2019    Procedure: Cardioversion or Defibrillation;  Surgeon: Raz Wagner MD;  Location: [facility] EP LAB;  Service: Cardiology;;     Family History   Problem Relation Age of Onset    Hypertension Father     Diabetes Father     Coronary artery disease Father     Heart disease Father         CHF    No Known Problems Mother     Cancer Sister 54        breast CA    No Known Problems Brother     Anxiety disorder Neg Hx     Depression Neg Hx     Dementia Neg Hx     Bipolar disorder Neg Hx     Suicide Neg Hx      Social History     Tobacco Use    Smoking status: Former     Packs/day: 1.00     Years: 31.00     Pack years: 31.00     Types: Cigarettes     Quit date: 2018     Years since quittin.7    Smokeless tobacco: Never    Tobacco comments:     pt is quiting on his own - pt stated not qualified for program;  pt  quit on his own   Substance Use Topics    Alcohol use: No     Alcohol/week: 0.0 standard drinks     Comment: quit    Drug use: No     Review of Systems   Constitutional:  Positive for fatigue.   Respiratory:  Negative for shortness of breath.    Cardiovascular:  Negative for chest pain.   Gastrointestinal:  Negative for anal bleeding.   All other systems reviewed and are negative.    Physical Exam     Initial Vitals   BP Pulse Resp Temp SpO2   22 1944 22 1758 22 1758 22 19422 1758   (!) 74/0 70 20 98.2 °F (36.8 °C) 100 %      MAP       --                Physical Exam    Nursing note and vitals reviewed.  Constitutional:   Elderly, chronically ill-appearing   HENT:   Head: Normocephalic and atraumatic.   Eyes: Pupils are equal, round, and reactive to light.   Neck:   Normal range of  motion.  Cardiovascular:            Lvad noted in place mechanical heart sounds   Pulmonary/Chest: No respiratory distress. He has no wheezes.   Abdominal: Abdomen is soft. He exhibits no distension. There is no abdominal tenderness.   Musculoskeletal:      Cervical back: Normal range of motion.     Neurological: He is alert and oriented to person, place, and time. GCS score is 15. GCS eye subscore is 4. GCS verbal subscore is 5. GCS motor subscore is 6.   Skin: Skin is warm and dry. Capillary refill takes less than 2 seconds.   Psychiatric: He has a normal mood and affect.       ED Course   Procedures  Labs Reviewed   CBC W/ AUTO DIFFERENTIAL - Abnormal; Notable for the following components:       Result Value    RBC 3.47 (*)     Hemoglobin 8.9 (*)     Hematocrit 31.7 (*)     MCH 25.6 (*)     MCHC 28.1 (*)     RDW 15.6 (*)     All other components within normal limits   COMPREHENSIVE METABOLIC PANEL - Abnormal; Notable for the following components:    CO2 22 (*)     Creatinine 1.6 (*)     Albumin 2.6 (*)     eGFR 50.6 (*)     All other components within normal limits   TROPONIN I - Abnormal; Notable for the following components:    Troponin I 0.569 (*)     All other components within normal limits   B-TYPE NATRIURETIC PEPTIDE - Abnormal; Notable for the following components:     (*)     All other components within normal limits   PROTIME-INR - Abnormal; Notable for the following components:    Prothrombin Time 29.9 (*)     INR 3.0 (*)     All other components within normal limits   ISTAT PROCEDURE - Abnormal; Notable for the following components:    POC PTWBT 35.5 (*)     POC PTINR 3.1 (*)     All other components within normal limits   ISTAT PROCEDURE - Abnormal; Notable for the following components:    POC Creatinine 1.7 (*)     POC Hematocrit 28 (*)     All other components within normal limits   TYPE & SCREEN   POCT PROTIME-INR   ISTAT CHEM8          Imaging Results               CTA Runoff ABD PEL Bilat  Lower Ext (Final result)  Result time 09/24/22 21:13:46      Final result by Alonso Gruber MD (09/24/22 21:13:46)                   Impression:      1. New mild fusiform dilation of the right femoral artery in the region of previous soft tissue density edema in the right groin from prior ECMO cannulation.  Interval decrease in size of anterior pseudoaneurysm likely due to combination of mass effect from adjacent tissue edema and fusiform dilatation.  There are 3 new suspected pseudoaneurysms including the largest pseudoaneurysm along the medial aspect with a saccular morphology.  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  2. Interval decrease in patchy opacifications in the bilateral lung bases.  3. Stable ectasia of the infrarenal abdominal aorta.  Interval resolution of hypoattenuation previously noted along the distal aorta, common iliac arteries, and right external iliac artery.  4. Postop change of ventricular assist device with trace fluid along the outflow tract driveline, similar to prior.  5. Hepatomegaly.  6. Biliary sludge.  7. Additional findings as above.  This critical information above was relayed by Fifi Messer MD  by telephone to Bryan Jama MD  on 09/24/2022 at 20:56.    This report was flagged in Epic as abnormal.    Electronically signed by resident: Fifi Messer  Date:    09/24/2022  Time:    19:48    Electronically signed by: Alonso Gruber MD  Date:    09/24/2022  Time:    21:13               Narrative:    EXAMINATION:  CTA RUNOFF ABD PEL BILAT LOWER EXT    CLINICAL HISTORY:  Aneurysm, pelvis or lower extremity;s/p echmo and lvad,  spontaneous bleed from right femoral artery;    TECHNIQUE:  CT angiogram of the abdomen/pelvis with lower extremity runoff using 125 mL of  Omnipaque 350 IV contrast.  Multiplanar reconstructions performed.    COMPARISON:  CT abdomen pelvis without contrast 08/23/2022 and CT chest abdomen pelvis 07/29/2022.    FINDINGS:  Abdominopelvic  Vasculature:    There is no atherosclerosis of the abdominal aorta.  Stable ectasia of the distal infrarenal abdominal aorta with decreased prominence of eccentric hypoattenuation previously noted along the distal aorta, common iliac arteries, and right external iliac artery.  There is minimal atherosclerosis of the bilateral common iliac arteries.  There is minimal atherosclerosis of the external iliac arteries and internal iliac arteries.    Right lower extremity:    - Common femoral: Suspected slight fusiform dilation of the femoral artery, new when compared to most recent contrast enhanced CT from 07/29/2022.  Decreased size of previously noted anterior outpouching favored to represent a pseudoaneurysm now measuring approximately 0.5 cm in diameter (previously 0.8 cm), decreased possibly relating to mass effect.  There is a new rounded medial outpouching of the femoral artery (coronal series 309, image 85), favored to represent pseudoaneurysm with saccular morphology.  Slightly cranially there is a second new small outpouching of the femoral artery measuring approximately 0.2 cm in diameter (axial series 315, image 316).  Medial and anterior there is a new third subtle outpouching measuring approximately 0.3 cm (axial series 315, image 314).  Stable appearance of surrounding lobulated soft tissue density and edema in the right groin in region of prior ECMO cannulation.    - SFA: Patent.    - Profunda: Patent.    - Popliteal: Patent.    - Runoff: There are 3 vessels runoff in the right lower extremity.  The anterior tibial artery is visualized to the level of the mid calf and the peroneal artery and posterior tibial artery are visualized to the level of the ankle.    - Bones: Normal    - Soft Tissues: Soft tissue edema in the right groin, as detailed above.    Left lower extremity:    - Common femoral: Patent.    - SFA: Patent.    - Profunda: Patent.    - Popliteal: Patent.    - Runoff: There are 3 vessels runoff  in the left lower extremity.  The anterior tibial artery is visualized the level of the mid calf in the peroneal artery and posterior tibial artery are visualized the level of the ankle.    - Bones: Normal    - Soft Tissues: Normal    Heart: Cardiomegaly.  Trace pericardial effusion, similar to prior.  Postoperative change of left ventricular assistant device placement with small fluid collections around the outflow tract and drive line, similar appearance to prior.    Lung Bases: Bilateral bandlike opacifications in the lung bases, favored to represent scarring or atelectasis.  Interval decrease in previously noted patchy opacifications and interstitial thickening.  Resolution of previously noted trace bilateral pleural effusions.    Liver: Mild hepatomegaly measuring 17.3 cm.  Mildly increased attenuation of the hepatic parenchyma, similar to prior.  No focal lesions.    Gallbladder/biliary system: Layering hyperdense material within the gallbladder lumen, favored represent biliary sludge.  No evidence of dilated ducts.    Pancreas: No mass or peripancreatic fat stranding.    Spleen: Unremarkable.    Adrenals: Unremarkable.    Urinary Tract: Both kidneys are normal in size and location with no solid renal masses, nephrolithiasis or hydronephrosis.  No evidence of obstructive uropathy.    Bowel/Mesentery: The stomach is unremarkable.  No evidence of bowel obstruction or inflammation.    Peritoneal Space: No ascites. No free air.    Retroperitoneum:  No significant adenopathy.    Pelvis:  No pelvic masses, adenopathy, or free fluid.    Abdominal wall:  Tiny fat containing umbilical hernia.    Bones: No acute fracture or bony destructive process.  Partially visualized postoperative change of median sternotomy.                                       X-Ray Chest AP Portable (Final result)  Result time 09/24/22 19:20:19      Final result by Alonso Gruber MD (09/24/22 19:20:19)                   Impression:      Grossly  stable chronic findings as above without radiographic acute intrathoracic process seen on this single view.      Electronically signed by: Alonso Gruber MD  Date:    09/24/2022  Time:    19:20               Narrative:    EXAMINATION:  XR CHEST AP PORTABLE    CLINICAL HISTORY:  Weakness    TECHNIQUE:  Single frontal view of the chest was performed.    COMPARISON:  Chest radiograph 09/22/2022    FINDINGS:  Patient is slightly rotated.  Monitoring leads overlie the chest.    Tracheostomy cannula, sternotomy wires and partially imaged LVAD and lower chest cardiac device appear grossly stable.    Cardiomediastinal silhouette is midline and stable.  There is nonspecific elevation the right hemidiaphragm.  Mild basilar atelectasis/scarring similar to prior.  No large consolidation, pleural effusion or pneumothorax definitively seen.  No acute osseous process seen.  PA and lateral views can be obtained.                                       Medications   ALPRAZolam tablet 1 mg (has no administration in time range)   amiodarone tablet 400 mg (has no administration in time range)   doxycycline tablet 100 mg (100 mg Oral Given 9/24/22 2042)   levothyroxine tablet 50 mcg (has no administration in time range)   magnesium oxide tablet 400 mg (has no administration in time range)   mexiletine capsule 250 mg (has no administration in time range)   mirtazapine tablet 30 mg (30 mg Oral Given 9/24/22 2042)   pantoprazole EC tablet 40 mg (has no administration in time range)   polyethylene glycol packet 17 g (has no administration in time range)   zolpidem tablet 5 mg (has no administration in time range)   iohexoL (OMNIPAQUE 350) injection 125 mL (125 mLs Intravenous Given 9/24/22 1855)     Medical Decision Making:   Initial Assessment:   56-year-old male multiple medical problems including LVAD for heart failure, recent VA ECMO presents the ER for evaluation spontaneous bleed from previous VA ECMO site.  Bleeding has since stopped.   No acute distress.  Informed cardiology transplant and surgical teams.  Will plan blood work CT symptomatic support observation reassess.  Hemodynamically stable.  CT revealed pseudoaneurysm.  Admitted to transplant team.                        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

## 2022-09-24 NOTE — HPI
Tim Richards is a 56 y.o.  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 Post-op course was prolonged and complicated by worsening cardiogenic shock/RV failure requiring VA-ECMO that was successfully decannulated and Klebsiella aerogenes pneumonia. He also had episodes of VT with ICD discharge 7/21 requiring amio and NSVT episodes requiring addition of lidocaine gtt. This was transitioned to PO amiodarone and mexiletine. He unfortunately required re-intubation 7/29 for hypercapneic respiratory failure after initial extubation 7/27 and developed Aflutter with RVR with drop in PI and BP requiring DCCV. He ultimately underwent tracheostomy 8/4 and weaned off Epi 8/8. He briefly required TPN. He was transitioned back to enteral feeding and subsequently improved from a swallow standpoint and tolerated regular diet. He was having issues with vertigo-like symptoms with unremarkable ENT workup and negative CT heads. Due to higher prolonged doses of mexiletine during hospitalization, this was reduced to 250 mg TID. Amiodarone was adjusted after note of some CT liver parenchyma attenuation with report of copper/iron/med deposition but due to reduction of mexiletine, this was increased to 400 mg daily. The CT had also shown possible signs suggestive of avascular necrosis of the femoral head and discussion with Endocrinology was held to wean steroid dosing for amiodarone-induced hyperthyroidism with recommendations for prednisone 5 mg daily to be continued through 8/31 then discontinued. He remained on methimazole. At the time of discharge, he had a size 8 cuffed Shiley trach with decision to downsize to be left as an outpatient. He was discharged on 9/1/2022.     Pt seen in clinic  by Dr. Ragsdale last week after being discharged from rehab. Pt worked with home PT/OT. Old DLES evaluated by CTS in clinic as well- groin site evaluated with plans for antibiotics (doxycycline) .     Pt has had low energy since leaving the hospital per his wife. This morning pt was sitting and had been crossing his legs for a prolonged period of time. When he uncrossed his legs and stood up he realized he had a pool of blood under him and he was having active bleeding from his right groin where previous ECMO cannula was. He tried walking but then fell given he was light headed. Per wife pt had 2 low flow alarms at home. Doppler BP was 80 in the ED. Hgb 8.9 from 9.4 several days ago. INR 3. He reports compliance with his medications. Pt with [facility] as well (cr 1.6)     TTE 8/30/22  There is an LVAD present. Base speed is 6300 RPMs. The pump type is a Heartmate III. The interventricular septum appears midline. The aortic valve does not open.  The left ventricle is severely enlarged with eccentric hypertrophy and severely decreased systolic function.  The estimated ejection fraction is 10%.  There is left ventricular global hypokinesis.  Left ventricular diastolic dysfunction.  There is trivial continuous aortic regurgitation.  There is abnormal septal wall motion.  The right ventricle is poorly seen, but appears enlarged with reduced systolic function.  Mild tricuspid regurgitation.  The estimated PA systolic pressure is 31 mmHg.  Normal central venous pressure (3 mmHg).  Severe left atrial enlargement.

## 2022-09-24 NOTE — SUBJECTIVE & OBJECTIVE
Past Medical History:   Diagnosis Date    A-fib     Anticoagulant long-term use     Atrial flutter 7/30/2022    CHF (congestive heart failure)     Class 1 obesity due to excess calories with serious comorbidity and body mass index (BMI) of 31.0 to 31.9 in adult     Class 1 obesity due to excess calories with serious comorbidity and body mass index (BMI) of 32.0 to 32.9 in adult     Dilated cardiomyopathy 1/10/2018    Disorder of kidney and ureter     CKD    Encounter for blood transfusion     Essential hypertension 8/28/2022    Gout     HTN (hypertension)     Hx of psychiatric care     ICD (implantable cardioverter-defibrillator) infection 7/1/2020    Psychiatric problem     Thyroid disease     Ventricular tachycardia (paroxysmal)        Past Surgical History:   Procedure Laterality Date    AORTIC VALVULOPLASTY N/A 7/13/2022    Procedure: REPAIR, AORTIC VALVE;  Surgeon: Yg Kaufman MD;  [facility];  Service: Cardiovascular;  Laterality: N/A;    APPLICATION OF WOUND VACUUM-ASSISTED CLOSURE DEVICE N/A 7/15/2022    Procedure: APPLICATION, WOUND VAC;  Surgeon: Yg Kaufman MD;  [facility];  Service: Cardiovascular;  Laterality: N/A;  50 x 5 cm     CARDIAC CATHETERIZATION  Dec. 2012    CARDIAC DEFIBRILLATOR PLACEMENT Left     CRRT-D    COLONOSCOPY N/A 3/6/2018    Procedure: COLONOSCOPY;  Surgeon: Alonso Bone MD;  Location: [facility] (2ND FLR);  Service: Endoscopy;  Laterality: N/A;    COLONOSCOPY N/A 7/17/2019    Procedure: COLONOSCOPY;  Surgeon: Blane Valdez MD;  [facility]  Service: Endoscopy;  Laterality: N/A;    COLONOSCOPY N/A 7/18/2019    Procedure: COLONOSCOPY;  Surgeon: Blane Valdez MD;  [facility]  Service: Endoscopy;  Laterality: N/A;    ESOPHAGOGASTRODUODENOSCOPY N/A 7/17/2019    Procedure: EGD (ESOPHAGOGASTRODUODENOSCOPY);  Surgeon: Blane Valdez MD;  [facility]  Service: Endoscopy;  Laterality:  N/A;    ESOPHAGOGASTRODUODENOSCOPY N/A 7/18/2019    Procedure: EGD (ESOPHAGOGASTRODUODENOSCOPY);  Surgeon: Blane Valdez MD;  Location: [facility] ENDO (2ND FLR);  Service: Endoscopy;  Laterality: N/A;    FOREIGN BODY REMOVAL N/A 7/22/2022    Procedure: REMOVAL, FOREIGN BODY;  Surgeon: Yg Kaufman MD;  Location: [facility] OR [facility] FLR;  Service: Cardiovascular;  Laterality: N/A;  LVAD Heartmate 2 drive line removal    INSERTION OF GRAFT TO PERICARDIUM N/A 7/15/2022    Procedure: INSERTION, GRAFT, PERICARDIUM;  Surgeon: Yg Kaufman MD;  Location: [facility] OR [facility] FLR;  Service: Cardiovascular;  Laterality: N/A;    IRRIGATION OF MEDIASTINUM N/A 7/15/2022    Procedure: IRRIGATION, MEDIASTINUM;  Surgeon: Yg Kaufman MD;  Location: [facility] OR [facility] FLR;  Service: Cardiovascular;  Laterality: N/A;    LYSIS OF ADHESIONS  7/13/2022    Procedure: LYSIS, ADHESIONS;  Surgeon: Yg Kaufman MD;  [facility];  Service: Cardiovascular;;    NONINVASIVE CARDIAC ELECTROPHYSIOLOGY STUDY N/A 10/18/2019    Procedure: CARDIAC ELECTROPHYSIOLOGY STUDY, NONINVASIVE;  Surgeon: Raz Wagner MD;  Location: [facility] EP LAB;  Service: Cardiology;  Laterality: N/A;  VT, DFTs, MDT CRTD in situ, LVAD, anes, MB, 3098    REPLACEMENT OF IMPLANTABLE CARDIOVERTER-DEFIBRILLATOR (ICD) GENERATOR N/A 3/9/2020    Procedure: REPLACEMENT, ICD GENERATOR;  Surgeon: Harry Yun MD;  Location: [facility] EP LAB;  Service: Cardiology;  Laterality: N/A;  VT, ICD Gen Change and Lead Revision, MDT, MAC, DM,3 Prep    REPLACEMENT OF LEFT VENTRICULAR ASSIST DEVICE (LVAD)  7/13/2022    Procedure: REPLACEMENT, LVAD;  Surgeon: Yg Kaufman MD;  [facility];  Service: Cardiovascular;;    REPLACEMENT OF PUMP N/A 7/13/2022    Procedure: REPLACEMENT, PUMP;  Surgeon: Yg Kaufman MD;  [facility];  Service: Cardiovascular;  Laterality: N/A;  LVAD pump exchange  EXPLANATION OF HEATMATE 2  IMPLANTATION OF HEARTMATE 3  IMPLANTATION OF 8MM  CHIMNEY GRAFT TO RFA  INITIATION OF ECMO  TEMPORARY CLOSURE OF CHEST    REVISION OF IMPLANTABLE CARDIOVERTER-DEFIBRILLATOR (ICD) ELECTRODE LEAD PLACEMENT N/A 3/9/2020    Procedure: REVISION, INSERTION, ELECTRODE LEAD, ICD;  Surgeon: Harry Yun MD;  Location: [facility] EP LAB;  Service: Cardiology;  Laterality: N/A;  VT, ICD Gen Change and Lead Revision, MDT, MAC, DM,3 Prep    STERNAL WOUND CLOSURE N/A 7/14/2022    Procedure: CLOSURE, WOUND, STERNUM;  Surgeon: Yg Kaufman MD;  Location: [facility] OR [facility] FLR;  Service: Cardiovascular;  Laterality: N/A;  temporary closure  evacuation of hematoma    STERNAL WOUND CLOSURE N/A 7/15/2022    Procedure: CLOSURE, WOUND, STERNUM;  Surgeon: Yg Kaufman MD;  [facility];  Service: Cardiovascular;  Laterality: N/A;    TRACHEOSTOMY N/A 8/4/2022    Procedure: CREATION, TRACHEOSTOMY;  Surgeon: Germain Holt MD;  [facility];  Service: General;  Laterality: N/A;    TREATMENT OF CARDIAC ARRHYTHMIA  10/18/2019    Procedure: Cardioversion or Defibrillation;  Surgeon: Raz Wagner MD;  Location: [facility] EP LAB;  Service: Cardiology;;       Review of patient's allergies indicates:   Allergen Reactions    Lisinopril Anaphylaxis    Hydralazine analogues      Chronic constipation, impotence, dizziness       No current facility-administered medications on file prior to encounter.     Current Outpatient Medications on File Prior to Encounter   Medication Sig    ALPRAZolam (XANAX) 1 MG tablet TAKE 1 TABLET BY MOUTH TWICE A DAILY AS NEEDED FOR ANXIETY.    amiodarone (PACERONE) 200 MG Tab Take 2 tablets (400 mg total) by mouth once daily.    aspirin (ECOTRIN) 81 MG EC tablet Take 1 tablet (81 mg total) by mouth once daily.    doxycycline (VIBRAMYCIN) 100 MG Cap Take 1 capsule (100 mg total) by mouth 2 (two) times daily. for 10 days    levothyroxine (SYNTHROID) 50 MCG tablet Take 1 tablet (50 mcg total) by mouth before breakfast.    magnesium  oxide (MAG-OX) 400 mg (241.3 mg magnesium) tablet Take 1 tablet (400 mg total) by mouth 2 (two) times daily.    mexiletine (MEXITIL) 250 MG Cap Take 1 capsule (250 mg total) by mouth every 8 (eight) hours.    pantoprazole (PROTONIX) 40 MG tablet Take 1 tablet (40 mg total) by mouth daily with lunch.    warfarin (COUMADIN) 5 MG tablet Take 1.5 tablets (7.5 mg total) by mouth Daily.    mirtazapine (REMERON) 30 MG tablet Take 1 tablet (30 mg total) by mouth every evening.    omega-3 acid ethyl esters (LOVAZA) 1 gram capsule Take 2 capsules (2 g total) by mouth 2 (two) times daily.    polyethylene glycol (GLYCOLAX) 17 gram PwPk Take 17 g by mouth once daily.    torsemide (DEMADEX) 20 MG Tab TAKE 2 TABLETS BY MOUTH ONCE DAILY (Patient taking differently: daily as needed.)    zolpidem (AMBIEN CR) 12.5 MG CR tablet TAKE 1 TABLET (12.5 MG TOTAL) BY MOUTH NIGHTLY AS NEEDED FOR INSOMNIA. (Patient not taking: Reported on 2022)    [DISCONTINUED] ferrous gluconate (FERGON) 324 MG tablet TAKE 1 TABLET (324 MG TOTAL) BY MOUTH WITH LUNCH.    [DISCONTINUED] sildenafiL (VIAGRA) 100 MG tablet Take 1 tablet (100 mg total) by mouth daily as needed for Erectile Dysfunction.     Family History       Problem Relation (Age of Onset)    Cancer Sister (54)    Coronary artery disease Father    Diabetes Father    Heart disease Father    Hypertension Father    No Known Problems Mother, Brother          Tobacco Use    Smoking status: Former     Packs/day: 1.00     Years: 31.00     Pack years: 31.00     Types: Cigarettes     Quit date: 2018     Years since quittin.7    Smokeless tobacco: Never    Tobacco comments:     pt is quiting on his own - pt stated not qualified for program;  pt  quit on his own   Substance and Sexual Activity    Alcohol use: No     Alcohol/week: 0.0 standard drinks     Comment: quit    Drug use: No    Sexual activity: Yes     Partners: Female     Birth control/protection: None     Comment:  10/5/17  with same partner 7 years      ROS  Objective:     Vital Signs (Most Recent):  Pulse: 75 (22)  Resp: 20 (22 1758)  SpO2: 100 % (22) Vital Signs (24h Range):  Pulse:  [70-75] 75  Resp:  [20] 20  SpO2:  [100 %] 100 %     Weight: 86.2 kg (190 lb)  Body mass index is 25.07 kg/m².    SpO2: 100 %  O2 Device (Oxygen Therapy): Non Rebreather Mask    No intake or output data in the 24 hours ending 22 1849    Lines/Drains/Airways       Airway  Duration                  Surgical Airway 22 Shiley Cuffed 51 days              Line  Duration                  VAD 22 1300 Left ventricular assist device HeartMate 3 73 days              Peripheral Intravenous Line  Duration                  Midline Catheter Insertion/Assessment  - Single Lumen 22 1056 Left brachial vein 20g x 8cm 30 days         Peripheral IV - Single Lumen 22 1800 18 G Anterior;Left Forearm <1 day         Peripheral IV - Single Lumen 22 1801 22 G Posterior;Right Forearm <1 day                    Physical Exam    Significant Labs: {Labs:47227}    Significant Imaging: {Imagin}

## 2022-09-24 NOTE — ED NOTES
I-STAT Chem-8+ Results:   Value Reference Range   Sodium 139 136-145 mmol/L   Potassium  4.4 3.5-5.1 mmol/L   Chloride 105  mmol/L   Ionized Calcium 1.09 1.06-1.42 mmol/L   CO2 (measured) 25 23-29 mmol/L   Glucose 85  mg/dL   BUN 11 6-30 mg/dL   Creatinine 1.7 0.5-1.4 mg/dL   Hematocrit 28 36-54%

## 2022-09-25 PROBLEM — I72.9 MYCOTIC ANEURYSM: Status: ACTIVE | Noted: 2022-09-25

## 2022-09-25 LAB
ALBUMIN SERPL BCP-MCNC: 2.3 G/DL (ref 3.5–5.2)
ALP SERPL-CCNC: 81 U/L (ref 55–135)
ALT SERPL W/O P-5'-P-CCNC: 16 U/L (ref 10–44)
ANION GAP SERPL CALC-SCNC: 7 MMOL/L (ref 8–16)
AST SERPL-CCNC: 31 U/L (ref 10–40)
BASOPHILS # BLD AUTO: 0.02 K/UL (ref 0–0.2)
BASOPHILS # BLD AUTO: 0.02 K/UL (ref 0–0.2)
BASOPHILS # BLD AUTO: 0.04 K/UL (ref 0–0.2)
BASOPHILS # BLD AUTO: 0.04 K/UL (ref 0–0.2)
BASOPHILS NFR BLD: 0.3 % (ref 0–1.9)
BASOPHILS NFR BLD: 0.4 % (ref 0–1.9)
BASOPHILS NFR BLD: 0.7 % (ref 0–1.9)
BASOPHILS NFR BLD: 0.7 % (ref 0–1.9)
BILIRUB SERPL-MCNC: 0.4 MG/DL (ref 0.1–1)
BLD PROD TYP BPU: NORMAL
BLD PROD TYP BPU: NORMAL
BLOOD UNIT EXPIRATION DATE: NORMAL
BLOOD UNIT EXPIRATION DATE: NORMAL
BLOOD UNIT TYPE CODE: 5100
BLOOD UNIT TYPE CODE: 5100
BLOOD UNIT TYPE: NORMAL
BLOOD UNIT TYPE: NORMAL
BUN SERPL-MCNC: 11 MG/DL (ref 6–20)
CALCIUM SERPL-MCNC: 8.4 MG/DL (ref 8.7–10.5)
CHLORIDE SERPL-SCNC: 106 MMOL/L (ref 95–110)
CO2 SERPL-SCNC: 24 MMOL/L (ref 23–29)
CODING SYSTEM: NORMAL
CODING SYSTEM: NORMAL
CREAT SERPL-MCNC: 1.5 MG/DL (ref 0.5–1.4)
DIFFERENTIAL METHOD: ABNORMAL
DISPENSE STATUS: NORMAL
DISPENSE STATUS: NORMAL
EOSINOPHIL # BLD AUTO: 0.2 K/UL (ref 0–0.5)
EOSINOPHIL # BLD AUTO: 0.3 K/UL (ref 0–0.5)
EOSINOPHIL NFR BLD: 3.6 % (ref 0–8)
EOSINOPHIL NFR BLD: 4.3 % (ref 0–8)
EOSINOPHIL NFR BLD: 4.3 % (ref 0–8)
EOSINOPHIL NFR BLD: 4.8 % (ref 0–8)
ERYTHROCYTE [DISTWIDTH] IN BLOOD BY AUTOMATED COUNT: 15.1 % (ref 11.5–14.5)
ERYTHROCYTE [DISTWIDTH] IN BLOOD BY AUTOMATED COUNT: 15.1 % (ref 11.5–14.5)
ERYTHROCYTE [DISTWIDTH] IN BLOOD BY AUTOMATED COUNT: 15.3 % (ref 11.5–14.5)
ERYTHROCYTE [DISTWIDTH] IN BLOOD BY AUTOMATED COUNT: 16.1 % (ref 11.5–14.5)
EST. GFR  (NO RACE VARIABLE): 54.6 ML/MIN/1.73 M^2
GLUCOSE SERPL-MCNC: 74 MG/DL (ref 70–110)
HCT VFR BLD AUTO: 26.2 % (ref 40–54)
HCT VFR BLD AUTO: 26.2 % (ref 40–54)
HCT VFR BLD AUTO: 26.4 % (ref 40–54)
HCT VFR BLD AUTO: 28.2 % (ref 40–54)
HGB BLD-MCNC: 7.2 G/DL (ref 14–18)
HGB BLD-MCNC: 7.6 G/DL (ref 14–18)
HGB BLD-MCNC: 7.6 G/DL (ref 14–18)
HGB BLD-MCNC: 8 G/DL (ref 14–18)
IMM GRANULOCYTES # BLD AUTO: 0.03 K/UL (ref 0–0.04)
IMM GRANULOCYTES # BLD AUTO: 0.04 K/UL (ref 0–0.04)
IMM GRANULOCYTES NFR BLD AUTO: 0.6 % (ref 0–0.5)
IMM GRANULOCYTES NFR BLD AUTO: 0.7 % (ref 0–0.5)
INR PPP: 2.8 (ref 0.8–1.2)
LYMPHOCYTES # BLD AUTO: 1.1 K/UL (ref 1–4.8)
LYMPHOCYTES # BLD AUTO: 1.3 K/UL (ref 1–4.8)
LYMPHOCYTES NFR BLD: 20.1 % (ref 18–48)
LYMPHOCYTES NFR BLD: 20.1 % (ref 18–48)
LYMPHOCYTES NFR BLD: 20.9 % (ref 18–48)
LYMPHOCYTES NFR BLD: 21.6 % (ref 18–48)
MCH RBC QN AUTO: 25.3 PG (ref 27–31)
MCH RBC QN AUTO: 26.3 PG (ref 27–31)
MCH RBC QN AUTO: 26.4 PG (ref 27–31)
MCH RBC QN AUTO: 26.4 PG (ref 27–31)
MCHC RBC AUTO-ENTMCNC: 27.3 G/DL (ref 32–36)
MCHC RBC AUTO-ENTMCNC: 28.4 G/DL (ref 32–36)
MCHC RBC AUTO-ENTMCNC: 29 G/DL (ref 32–36)
MCHC RBC AUTO-ENTMCNC: 29 G/DL (ref 32–36)
MCV RBC AUTO: 91 FL (ref 82–98)
MCV RBC AUTO: 91 FL (ref 82–98)
MCV RBC AUTO: 93 FL (ref 82–98)
MCV RBC AUTO: 93 FL (ref 82–98)
MONOCYTES # BLD AUTO: 0.6 K/UL (ref 0.3–1)
MONOCYTES # BLD AUTO: 0.7 K/UL (ref 0.3–1)
MONOCYTES # BLD AUTO: 0.7 K/UL (ref 0.3–1)
MONOCYTES # BLD AUTO: 0.8 K/UL (ref 0.3–1)
MONOCYTES NFR BLD: 11.4 % (ref 4–15)
MONOCYTES NFR BLD: 12.3 % (ref 4–15)
MONOCYTES NFR BLD: 12.3 % (ref 4–15)
MONOCYTES NFR BLD: 14.4 % (ref 4–15)
NEUTROPHILS # BLD AUTO: 3.3 K/UL (ref 1.8–7.7)
NEUTROPHILS # BLD AUTO: 3.4 K/UL (ref 1.8–7.7)
NEUTROPHILS NFR BLD: 58.2 % (ref 38–73)
NEUTROPHILS NFR BLD: 62 % (ref 38–73)
NEUTROPHILS NFR BLD: 62 % (ref 38–73)
NEUTROPHILS NFR BLD: 63.1 % (ref 38–73)
NRBC BLD-RTO: 0 /100 WBC
NUM UNITS TRANS PACKED RBC: NORMAL
NUM UNITS TRANS PACKED RBC: NORMAL
PLATELET # BLD AUTO: 277 K/UL (ref 150–450)
PLATELET # BLD AUTO: 282 K/UL (ref 150–450)
PLATELET # BLD AUTO: 290 K/UL (ref 150–450)
PLATELET # BLD AUTO: 290 K/UL (ref 150–450)
PMV BLD AUTO: 10.5 FL (ref 9.2–12.9)
PMV BLD AUTO: 9.4 FL (ref 9.2–12.9)
PMV BLD AUTO: 9.9 FL (ref 9.2–12.9)
PMV BLD AUTO: 9.9 FL (ref 9.2–12.9)
POTASSIUM SERPL-SCNC: 4.7 MMOL/L (ref 3.5–5.1)
PROT SERPL-MCNC: 5.8 G/DL (ref 6–8.4)
PROTHROMBIN TIME: 27.5 SEC (ref 9–12.5)
RBC # BLD AUTO: 2.85 M/UL (ref 4.6–6.2)
RBC # BLD AUTO: 2.88 M/UL (ref 4.6–6.2)
RBC # BLD AUTO: 2.88 M/UL (ref 4.6–6.2)
RBC # BLD AUTO: 3.04 M/UL (ref 4.6–6.2)
SODIUM SERPL-SCNC: 137 MMOL/L (ref 136–145)
WBC # BLD AUTO: 5.27 K/UL (ref 3.9–12.7)
WBC # BLD AUTO: 5.37 K/UL (ref 3.9–12.7)
WBC # BLD AUTO: 5.37 K/UL (ref 3.9–12.7)
WBC # BLD AUTO: 5.78 K/UL (ref 3.9–12.7)

## 2022-09-25 PROCEDURE — 87040 BLOOD CULTURE FOR BACTERIA: CPT | Performed by: STUDENT IN AN ORGANIZED HEALTH CARE EDUCATION/TRAINING PROGRAM

## 2022-09-25 PROCEDURE — 20000000 HC ICU ROOM

## 2022-09-25 PROCEDURE — 63600175 PHARM REV CODE 636 W HCPCS: Performed by: STUDENT IN AN ORGANIZED HEALTH CARE EDUCATION/TRAINING PROGRAM

## 2022-09-25 PROCEDURE — 25000003 PHARM REV CODE 250: Performed by: STUDENT IN AN ORGANIZED HEALTH CARE EDUCATION/TRAINING PROGRAM

## 2022-09-25 PROCEDURE — 99291 PR CRITICAL CARE, E/M 30-74 MINUTES: ICD-10-PCS | Mod: ,,, | Performed by: NURSE PRACTITIONER

## 2022-09-25 PROCEDURE — 99223 PR INITIAL HOSPITAL CARE,LEVL III: ICD-10-PCS | Mod: ,,, | Performed by: STUDENT IN AN ORGANIZED HEALTH CARE EDUCATION/TRAINING PROGRAM

## 2022-09-25 PROCEDURE — P9016 RBC LEUKOCYTES REDUCED: HCPCS | Mod: BL | Performed by: STUDENT IN AN ORGANIZED HEALTH CARE EDUCATION/TRAINING PROGRAM

## 2022-09-25 PROCEDURE — 99223 1ST HOSP IP/OBS HIGH 75: CPT | Mod: ,,, | Performed by: STUDENT IN AN ORGANIZED HEALTH CARE EDUCATION/TRAINING PROGRAM

## 2022-09-25 PROCEDURE — 87077 CULTURE AEROBIC IDENTIFY: CPT

## 2022-09-25 PROCEDURE — 99222 PR INITIAL HOSPITAL CARE,LEVL II: ICD-10-PCS | Mod: ,,, | Performed by: SURGERY

## 2022-09-25 PROCEDURE — 87186 SC STD MICRODIL/AGAR DIL: CPT

## 2022-09-25 PROCEDURE — 99291 CRITICAL CARE FIRST HOUR: CPT | Mod: ,,, | Performed by: NURSE PRACTITIONER

## 2022-09-25 PROCEDURE — 85025 COMPLETE CBC W/AUTO DIFF WBC: CPT | Mod: 91 | Performed by: NURSE PRACTITIONER

## 2022-09-25 PROCEDURE — P9016 RBC LEUKOCYTES REDUCED: HCPCS | Performed by: NURSE PRACTITIONER

## 2022-09-25 PROCEDURE — 99292 CRITICAL CARE ADDL 30 MIN: CPT | Mod: ,,, | Performed by: INTERNAL MEDICINE

## 2022-09-25 PROCEDURE — 99233 PR SUBSEQUENT HOSPITAL CARE,LEVL III: ICD-10-PCS | Mod: ,,, | Performed by: SURGERY

## 2022-09-25 PROCEDURE — 85610 PROTHROMBIN TIME: CPT | Performed by: INTERNAL MEDICINE

## 2022-09-25 PROCEDURE — 87075 CULTR BACTERIA EXCEPT BLOOD: CPT

## 2022-09-25 PROCEDURE — 94761 N-INVAS EAR/PLS OXIMETRY MLT: CPT

## 2022-09-25 PROCEDURE — 87102 FUNGUS ISOLATION CULTURE: CPT

## 2022-09-25 PROCEDURE — 99900026 HC AIRWAY MAINTENANCE (STAT)

## 2022-09-25 PROCEDURE — 27000248 HC VAD-ADDITIONAL DAY

## 2022-09-25 PROCEDURE — 25000003 PHARM REV CODE 250: Performed by: INTERNAL MEDICINE

## 2022-09-25 PROCEDURE — 63600175 PHARM REV CODE 636 W HCPCS: Performed by: INTERNAL MEDICINE

## 2022-09-25 PROCEDURE — 87070 CULTURE OTHR SPECIMN AEROBIC: CPT

## 2022-09-25 PROCEDURE — 25000003 PHARM REV CODE 250: Performed by: NURSE PRACTITIONER

## 2022-09-25 PROCEDURE — 99233 SBSQ HOSP IP/OBS HIGH 50: CPT | Mod: ,,, | Performed by: SURGERY

## 2022-09-25 PROCEDURE — 99222 1ST HOSP IP/OBS MODERATE 55: CPT | Mod: ,,, | Performed by: SURGERY

## 2022-09-25 PROCEDURE — 80053 COMPREHEN METABOLIC PANEL: CPT | Performed by: INTERNAL MEDICINE

## 2022-09-25 PROCEDURE — 99900035 HC TECH TIME PER 15 MIN (STAT)

## 2022-09-25 PROCEDURE — 99292 PR CRITICAL CARE, ADDL 30 MIN: ICD-10-PCS | Mod: ,,, | Performed by: INTERNAL MEDICINE

## 2022-09-25 PROCEDURE — 63600175 PHARM REV CODE 636 W HCPCS: Performed by: NURSE PRACTITIONER

## 2022-09-25 PROCEDURE — 85025 COMPLETE CBC W/AUTO DIFF WBC: CPT | Performed by: INTERNAL MEDICINE

## 2022-09-25 PROCEDURE — 36430 TRANSFUSION BLD/BLD COMPNT: CPT

## 2022-09-25 RX ORDER — CEFEPIME HYDROCHLORIDE 1 G/50ML
2 INJECTION, SOLUTION INTRAVENOUS
Status: DISCONTINUED | OUTPATIENT
Start: 2022-09-25 | End: 2022-09-27

## 2022-09-25 RX ORDER — LANOLIN ALCOHOL/MO/W.PET/CERES
400 CREAM (GRAM) TOPICAL
Status: DISCONTINUED | OUTPATIENT
Start: 2022-09-26 | End: 2022-10-05 | Stop reason: HOSPADM

## 2022-09-25 RX ORDER — HYDROCODONE BITARTRATE AND ACETAMINOPHEN 500; 5 MG/1; MG/1
TABLET ORAL
Status: DISCONTINUED | OUTPATIENT
Start: 2022-09-25 | End: 2022-09-26

## 2022-09-25 RX ORDER — CEFEPIME HYDROCHLORIDE 1 G/50ML
2 INJECTION, SOLUTION INTRAVENOUS
Status: DISCONTINUED | OUTPATIENT
Start: 2022-09-25 | End: 2022-09-25

## 2022-09-25 RX ORDER — SODIUM CHLORIDE 9 MG/ML
INJECTION, SOLUTION INTRAVENOUS
Status: DISCONTINUED | OUTPATIENT
Start: 2022-09-25 | End: 2022-10-05 | Stop reason: HOSPADM

## 2022-09-25 RX ADMIN — AMIODARONE HYDROCHLORIDE 400 MG: 200 TABLET ORAL at 08:09

## 2022-09-25 RX ADMIN — SODIUM CHLORIDE: 9 INJECTION, SOLUTION INTRAVENOUS at 04:09

## 2022-09-25 RX ADMIN — MEXILETINE HYDROCHLORIDE 250 MG: 250 CAPSULE ORAL at 10:09

## 2022-09-25 RX ADMIN — LEVOTHYROXINE SODIUM 50 MCG: 50 TABLET ORAL at 05:09

## 2022-09-25 RX ADMIN — MEXILETINE HYDROCHLORIDE 250 MG: 250 CAPSULE ORAL at 08:09

## 2022-09-25 RX ADMIN — SODIUM CHLORIDE: 9 INJECTION, SOLUTION INTRAVENOUS at 03:09

## 2022-09-25 RX ADMIN — SODIUM CHLORIDE: 0.9 INJECTION, SOLUTION INTRAVENOUS at 04:09

## 2022-09-25 RX ADMIN — TORSEMIDE 20 MG: 20 TABLET ORAL at 08:09

## 2022-09-25 RX ADMIN — MIRTAZAPINE 30 MG: 30 TABLET, FILM COATED ORAL at 08:09

## 2022-09-25 RX ADMIN — Medication 400 MG: at 12:09

## 2022-09-25 RX ADMIN — DOXYCYCLINE HYCLATE 100 MG: 100 TABLET, COATED ORAL at 08:09

## 2022-09-25 RX ADMIN — DAPTOMYCIN 540 MG: 350 INJECTION, POWDER, LYOPHILIZED, FOR SOLUTION INTRAVENOUS at 04:09

## 2022-09-25 RX ADMIN — MEXILETINE HYDROCHLORIDE 250 MG: 250 CAPSULE ORAL at 01:09

## 2022-09-25 RX ADMIN — CEFEPIME 2 G: 2 INJECTION, POWDER, FOR SOLUTION INTRAVENOUS at 10:09

## 2022-09-25 RX ADMIN — PHYTONADIONE 2.5 MG: 10 INJECTION, EMULSION INTRAMUSCULAR; INTRAVENOUS; SUBCUTANEOUS at 08:09

## 2022-09-25 RX ADMIN — ALPRAZOLAM 1 MG: 0.5 TABLET ORAL at 08:09

## 2022-09-25 RX ADMIN — PANTOPRAZOLE SODIUM 40 MG: 40 TABLET, DELAYED RELEASE ORAL at 12:09

## 2022-09-25 RX ADMIN — CEFEPIME 2 G: 2 INJECTION, POWDER, FOR SOLUTION INTRAVENOUS at 04:09

## 2022-09-25 NOTE — ASSESSMENT & PLAN NOTE
Tim Richards is a 56 y.o. 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 Vascular surgery has been consulted for evaluation of right groin site concerning for pseudoaneurym.     Patient not currently bleeding, hemoglobin is stable, and INR supratherapeutic at 3. History of purulence from right groni wound also complicates decision making of what to do. Given patient's extensive comorbidities and high operative risk, we will plan to treat pseudoaneurysm with endovascular interventions    Recommend:  - Infectious Disease consultation with intention to treat infected wound and 6 covered endovascular stent  -Wound culture sent for anaerobic, aerobic, and fungal  -Broad-spectrum antibiotics  -Will plan for right ileo-femoral bypass and Rt groin exploration tomorrow. Consent has been signed and patient is marked  -Risks, benefits, and alternatives discussed with the patient, he agrees with plan and wished to proceed  -NPO at midnight  -Strict bedrest, head of bed less than 30°, keep right leg straight  -Blood cultures x2: NGTD  -Hold coumadin, will need to transition to heparin gtt and hold during operation,   -Anticoagulation per primary team  - Pack wound and dress with tegaderm, vascular surgery will change BID  -Allow INR to drift, currently supra therapeutic  -Tranfuse FFP for goal normal INR, goal is INR<1.7. Recheck post transfusion  - Transfuse 1unit PRBC, goal HGB>10. Recheck post transfusion  - Start heparin ggt at level determined by heart failure service  - Plan for ileofemoral  bypass on 9/27, rifampin soaked dacron bypass, Cardiac anesthesia  - Plastics consultation for possible muscle flap coverage.  - Pack wound and dress with tegaderm, vascular surgery will change BID

## 2022-09-25 NOTE — SUBJECTIVE & OBJECTIVE
Medications Prior to Admission   Medication Sig Dispense Refill Last Dose    ALPRAZolam (XANAX) 1 MG tablet TAKE 1 TABLET BY MOUTH TWICE A DAILY AS NEEDED FOR ANXIETY. 30 tablet 0 9/24/2022    amiodarone (PACERONE) 200 MG Tab Take 2 tablets (400 mg total) by mouth once daily. 180 tablet 3 9/24/2022    aspirin (ECOTRIN) 81 MG EC tablet Take 1 tablet (81 mg total) by mouth once daily. 30 tablet 11 9/23/2022    doxycycline (VIBRAMYCIN) 100 MG Cap Take 1 capsule (100 mg total) by mouth 2 (two) times daily. for 10 days 20 capsule 0 9/24/2022    levothyroxine (SYNTHROID) 50 MCG tablet Take 1 tablet (50 mcg total) by mouth before breakfast. 30 tablet 11 9/24/2022    magnesium oxide (MAG-OX) 400 mg (241.3 mg magnesium) tablet Take 1 tablet (400 mg total) by mouth 2 (two) times daily.  0 9/24/2022    mexiletine (MEXITIL) 250 MG Cap Take 1 capsule (250 mg total) by mouth every 8 (eight) hours. 90 capsule 3 9/24/2022    pantoprazole (PROTONIX) 40 MG tablet Take 1 tablet (40 mg total) by mouth daily with lunch. 90 tablet 3 9/24/2022    warfarin (COUMADIN) 5 MG tablet Take 1.5 tablets (7.5 mg total) by mouth Daily. 135 tablet 3 9/23/2022    mirtazapine (REMERON) 30 MG tablet Take 1 tablet (30 mg total) by mouth every evening. 30 tablet 11     omega-3 acid ethyl esters (LOVAZA) 1 gram capsule Take 2 capsules (2 g total) by mouth 2 (two) times daily. 360 capsule 3     polyethylene glycol (GLYCOLAX) 17 gram PwPk Take 17 g by mouth once daily.  0     torsemide (DEMADEX) 20 MG Tab TAKE 2 TABLETS BY MOUTH ONCE DAILY (Patient taking differently: daily as needed.) 180 tablet 3     zolpidem (AMBIEN CR) 12.5 MG CR tablet TAKE 1 TABLET (12.5 MG TOTAL) BY MOUTH NIGHTLY AS NEEDED FOR INSOMNIA. (Patient not taking: Reported on 9/22/2022) 15 tablet 0        Review of patient's allergies indicates:   Allergen Reactions    Lisinopril Anaphylaxis    Hydralazine analogues      Chronic constipation, impotence, dizziness       Past Medical History:    Diagnosis Date    A-fib     Anticoagulant long-term use     Atrial flutter 7/30/2022    CHF (congestive heart failure)     Class 1 obesity due to excess calories with serious comorbidity and body mass index (BMI) of 31.0 to 31.9 in adult     Class 1 obesity due to excess calories with serious comorbidity and body mass index (BMI) of 32.0 to 32.9 in adult     Dilated cardiomyopathy 1/10/2018    Disorder of kidney and ureter     CKD    Encounter for blood transfusion     Essential hypertension 8/28/2022    Gout     HTN (hypertension)     Hx of psychiatric care     ICD (implantable cardioverter-defibrillator) infection 7/1/2020    Psychiatric problem     Thyroid disease     Ventricular tachycardia (paroxysmal)      Past Surgical History:   Procedure Laterality Date    AORTIC VALVULOPLASTY N/A 7/13/2022    Procedure: REPAIR, AORTIC VALVE;  Surgeon: Yg Kaufman MD;  [facility];  Service: Cardiovascular;  Laterality: N/A;    APPLICATION OF WOUND VACUUM-ASSISTED CLOSURE DEVICE N/A 7/15/2022    Procedure: APPLICATION, WOUND VAC;  Surgeon: Yg Kaufman MD;  [facility];  Service: Cardiovascular;  Laterality: N/A;  50 x 5 cm     CARDIAC CATHETERIZATION  Dec. 2012    CARDIAC DEFIBRILLATOR PLACEMENT Left     CRRT-D    COLONOSCOPY N/A 3/6/2018    Procedure: COLONOSCOPY;  Surgeon: Alonso Bone MD;  Location: [facility] (2ND FLR);  Service: Endoscopy;  Laterality: N/A;    COLONOSCOPY N/A 7/17/2019    Procedure: COLONOSCOPY;  Surgeon: Blane Valdez MD;  Location: [facility] (2ND FLR);  Service: Endoscopy;  Laterality: N/A;    COLONOSCOPY N/A 7/18/2019    Procedure: COLONOSCOPY;  Surgeon: Blane Valdez MD;  Location: [facility] (2ND FLR);  Service: Endoscopy;  Laterality: N/A;    ESOPHAGOGASTRODUODENOSCOPY N/A 7/17/2019    Procedure: EGD (ESOPHAGOGASTRODUODENOSCOPY);  Surgeon: Blane Valdez MD;  Location: [facility] (2ND FLR);  Service: Endoscopy;  Laterality: N/A;    ESOPHAGOGASTRODUODENOSCOPY N/A 7/18/2019     Procedure: EGD (ESOPHAGOGASTRODUODENOSCOPY);  Surgeon: Blane Valdez MD;  Location: [facility] ENDO (2ND FLR);  Service: Endoscopy;  Laterality: N/A;    FOREIGN BODY REMOVAL N/A 7/22/2022    Procedure: REMOVAL, FOREIGN BODY;  Surgeon: Yg Kaufman MD;  Location: [facility] OR [facility] FLR;  Service: Cardiovascular;  Laterality: N/A;  LVAD Heartmate 2 drive line removal    INSERTION OF GRAFT TO PERICARDIUM N/A 7/15/2022    Procedure: INSERTION, GRAFT, PERICARDIUM;  Surgeon: Yg Kaufman MD;  Location: [facility] OR [facility] FLR;  Service: Cardiovascular;  Laterality: N/A;    IRRIGATION OF MEDIASTINUM N/A 7/15/2022    Procedure: IRRIGATION, MEDIASTINUM;  Surgeon: Yg Kaufman MD;  Location: [facility] OR [facility] FLR;  Service: Cardiovascular;  Laterality: N/A;    LYSIS OF ADHESIONS  7/13/2022    Procedure: LYSIS, ADHESIONS;  Surgeon: Yg Kaufman MD;  [facility];  Service: Cardiovascular;;    NONINVASIVE CARDIAC ELECTROPHYSIOLOGY STUDY N/A 10/18/2019    Procedure: CARDIAC ELECTROPHYSIOLOGY STUDY, NONINVASIVE;  Surgeon: Raz Wagner MD;  Location: [facility] EP LAB;  Service: Cardiology;  Laterality: N/A;  VT, DFTs, MDT CRTD in situ, LVAD, LASHELL pizarro, 3098    REPLACEMENT OF IMPLANTABLE CARDIOVERTER-DEFIBRILLATOR (ICD) GENERATOR N/A 3/9/2020    Procedure: REPLACEMENT, ICD GENERATOR;  Surgeon: Harry Yun MD;  Location: [facility] EP LAB;  Service: Cardiology;  Laterality: N/A;  VT, ICD Gen Change and Lead Revision, MDT, MAC, DM,3 Prep    REPLACEMENT OF LEFT VENTRICULAR ASSIST DEVICE (LVAD)  7/13/2022    Procedure: REPLACEMENT, LVAD;  Surgeon: Yg Kaufman MD;  [facility];  Service: Cardiovascular;;    REPLACEMENT OF PUMP N/A 7/13/2022    Procedure: REPLACEMENT, PUMP;  Surgeon: Yg Kaufman MD;  [facility];  Service: Cardiovascular;  Laterality: N/A;  LVAD pump exchange  EXPLANATION OF HEATMATE 2  IMPLANTATION OF HEARTMATE 3  IMPLANTATION OF 8MM CHIMNEY GRAFT TO RFA  INITIATION OF ECMO  TEMPORARY CLOSURE  OF CHEST    REVISION OF IMPLANTABLE CARDIOVERTER-DEFIBRILLATOR (ICD) ELECTRODE LEAD PLACEMENT N/A 3/9/2020    Procedure: REVISION, INSERTION, ELECTRODE LEAD, ICD;  Surgeon: Harry Yun MD;  Location: [facility] EP LAB;  Service: Cardiology;  Laterality: N/A;  VT, ICD Gen Change and Lead Revision, MDT, MAC, DM,3 Prep    STERNAL WOUND CLOSURE N/A 2022    Procedure: CLOSURE, WOUND, STERNUM;  Surgeon: Yg Kaufman MD;  Location: [facility] OR [facility] FLR;  Service: Cardiovascular;  Laterality: N/A;  temporary closure  evacuation of hematoma    STERNAL WOUND CLOSURE N/A 7/15/2022    Procedure: CLOSURE, WOUND, STERNUM;  Surgeon: Yg Kaufman MD;  Location: [facility] OR [facility] FLR;  Service: Cardiovascular;  Laterality: N/A;    TRACHEOSTOMY N/A 2022    Procedure: CREATION, TRACHEOSTOMY;  Surgeon: Germain Holt MD;  [facility];  Service: General;  Laterality: N/A;    TREATMENT OF CARDIAC ARRHYTHMIA  10/18/2019    Procedure: Cardioversion or Defibrillation;  Surgeon: Raz Wagner MD;  Location: [facility] EP LAB;  Service: Cardiology;;     Family History       Problem Relation (Age of Onset)    Cancer Sister (54)    Coronary artery disease Father    Diabetes Father    Heart disease Father    Hypertension Father    No Known Problems Mother, Brother          Tobacco Use    Smoking status: Former     Packs/day: 1.00     Years: 31.00     Pack years: 31.00     Types: Cigarettes     Quit date: 2018     Years since quittin.7    Smokeless tobacco: Never    Tobacco comments:     pt is quiting on his own - pt stated not qualified for program;  pt  quit on his own   Substance and Sexual Activity    Alcohol use: No     Alcohol/week: 0.0 standard drinks     Comment: quit    Drug use: No    Sexual activity: Yes     Partners: Female     Birth control/protection: None     Comment: 10/5/17  with same partner 7 years      Review of Systems   Constitutional:  Negative for chills, diaphoresis, fatigue and  fever.   Respiratory:  Negative for cough, chest tightness and shortness of breath.    Cardiovascular:  Negative for chest pain.   Gastrointestinal:  Negative for abdominal distention and abdominal pain.   Musculoskeletal:  Negative for myalgias.   Skin:  Negative for wound.   Neurological:  Negative for syncope and light-headedness.   Hematological:         Bleeding from groin wound earlier today while leg was crossed, no longer bleeding   Objective:     Vital Signs (Most Recent):  Temp: 97.4 °F (36.3 °C) (09/24/22 2136)  Pulse: (!) 59 (09/24/22 2318)  Resp: 18 (09/24/22 2136)  BP: (!) 92/0 (09/24/22 2141)  SpO2: (!) 92 % (09/24/22 2136)   Vital Signs (24h Range):  Temp:  [97.4 °F (36.3 °C)-98.2 °F (36.8 °C)] 97.4 °F (36.3 °C)  Pulse:  [59-79] 59  Resp:  [18-20] 18  SpO2:  [92 %-100 %] 92 %  BP: (74-99)/(0-73) 92/0     Weight: 90.2 kg (198 lb 13.7 oz)  Body mass index is 26.24 kg/m².    Physical Exam  Constitutional:       Appearance: Normal appearance.   HENT:      Head: Normocephalic and atraumatic.      Mouth/Throat:      Mouth: Mucous membranes are moist.   Eyes:      Pupils: Pupils are equal, round, and reactive to light.   Cardiovascular:      Rate and Rhythm: Regular rhythm. Bradycardia present.   Abdominal:      General: Abdomen is flat.      Palpations: Abdomen is soft.      Comments: Previous drive line site open healing with granulation tissue, no purulence noted   Musculoskeletal:      Comments: 5/5 strength all 4 extremities, palpable femoral, PT and DP pulses bilaterally. Right groin cutdown site open with granulation tissue and thrombus in wound bed, no active bleeding.    Neurological:      Mental Status: He is alert.       Significant Labs:  Recent Lab Results         09/24/22 1815 09/24/22 1810 09/24/22 1803        Albumin     2.6       Alkaline Phosphatase     94       ALT     20       Anion Gap     11       AST     31       Baso #     0.02       Basophil %     0.3       BILIRUBIN TOTAL      0.4  Comment: For infants and newborns, interpretation of results should be based  on gestational age, weight and in agreement with clinical  observations.    Premature Infant recommended reference ranges:  Up to 24 hours.............<8.0 mg/dL  Up to 48 hours............<12.0 mg/dL  3-5 days..................<15.0 mg/dL  6-29 days.................<15.0 mg/dL         BNP     229  Comment: Values of less than 100 pg/ml are consistent with non-CHF populations.       BUN     11       Calcium     8.9       Chloride     106       CO2     22       Creatinine     1.6       Differential Method     Automated       eGFR     50.6       Eos #     0.2       Eosinophil %     4.0       Glucose     79       Gran # (ANC)     3.3       Gran %     58.2       Group & Rh     O POS       Hematocrit     31.7       Hemoglobin     8.9       Immature Grans (Abs)     0.02  Comment: Mild elevation in immature granulocytes is non specific and   can be seen in a variety of conditions including stress response,   acute inflammation, trauma and pregnancy. Correlation with other   laboratory and clinical findings is essential.         Immature Granulocytes     0.3       INDIRECT MASON     NEG       INR     3.0  Comment: Coumadin Therapy:  2.0 - 3.0 for INR for all indicators except mechanical heart valves  and antiphospholipid syndromes which should use 2.5 - 3.5.         Lymph #     1.4       Lymph %     24.0       MCH     25.6       MCHC     28.1       MCV     91       Mono #     0.8       Mono %     13.2       MPV     9.6       nRBC     0       Platelets     335       POC BUN 11           POC Chloride 105           POC Creatinine 1.7           POC Glucose 85           POC Hematocrit 28           POC Ionized Calcium 1.09           POC Potassium 4.4           POC PTINR   3.1         POC PTWBT   35.5         POC Sodium 139           POC TCO2 (MEASURED) 25           Potassium     4.3       PROTEIN TOTAL     6.5       Protime     29.9       RBC      3.47       RDW     15.6       Sample MAEVE   unknown         Sodium     139       Troponin I     0.569  Comment: The reference interval for Troponin I represents the 99th percentile   cutoff   for our facility and is consistent with 3rd generation assay   performance.         WBC     5.75               Significant Diagnostics:  Impression:     1. New mild fusiform dilation of the right femoral artery in the region of previous soft tissue density edema in the right groin from prior ECMO cannulation.  Interval decrease in size of anterior pseudoaneurysm likely due to combination of mass effect from adjacent tissue edema and fusiform dilatation.  There are 3 new suspected pseudoaneurysms including the largest pseudoaneurysm along the medial aspect with a saccular morphology.  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  2. Interval decrease in patchy opacifications in the bilateral lung bases.  3. Stable ectasia of the infrarenal abdominal aorta.  Interval resolution of hypoattenuation previously noted along the distal aorta, common iliac arteries, and right external iliac artery.  4. Postop change of ventricular assist device with trace fluid along the outflow tract driveline, similar to prior.  5. Hepatomegaly.  6. Biliary sludge.

## 2022-09-25 NOTE — ASSESSMENT & PLAN NOTE
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

## 2022-09-25 NOTE — SUBJECTIVE & OBJECTIVE
Past Medical History:   Diagnosis Date    A-fib     Anticoagulant long-term use     Atrial flutter 7/30/2022    CHF (congestive heart failure)     Class 1 obesity due to excess calories with serious comorbidity and body mass index (BMI) of 31.0 to 31.9 in adult     Class 1 obesity due to excess calories with serious comorbidity and body mass index (BMI) of 32.0 to 32.9 in adult     Dilated cardiomyopathy 1/10/2018    Disorder of kidney and ureter     CKD    Encounter for blood transfusion     Essential hypertension 8/28/2022    Gout     HTN (hypertension)     Hx of psychiatric care     ICD (implantable cardioverter-defibrillator) infection 7/1/2020    Psychiatric problem     Thyroid disease     Ventricular tachycardia (paroxysmal)        Past Surgical History:   Procedure Laterality Date    AORTIC VALVULOPLASTY N/A 7/13/2022    Procedure: REPAIR, AORTIC VALVE;  Surgeon: Yg Kaufman MD;  [facility];  Service: Cardiovascular;  Laterality: N/A;    APPLICATION OF WOUND VACUUM-ASSISTED CLOSURE DEVICE N/A 7/15/2022    Procedure: APPLICATION, WOUND VAC;  Surgeon: Yg Kaufman MD;  [facility];  Service: Cardiovascular;  Laterality: N/A;  50 x 5 cm     CARDIAC CATHETERIZATION  Dec. 2012    CARDIAC DEFIBRILLATOR PLACEMENT Left     CRRT-D    COLONOSCOPY N/A 3/6/2018    Procedure: COLONOSCOPY;  Surgeon: Alonso Bone MD;  [facility]  Service: Endoscopy;  Laterality: N/A;    COLONOSCOPY N/A 7/17/2019    Procedure: COLONOSCOPY;  Surgeon: Blane Valdez MD;  [facility]  Service: Endoscopy;  Laterality: N/A;    COLONOSCOPY N/A 7/18/2019    Procedure: COLONOSCOPY;  Surgeon: Blane Valdez MD;  [facility]  Service: Endoscopy;  Laterality: N/A;    ESOPHAGOGASTRODUODENOSCOPY N/A 7/17/2019    Procedure: EGD (ESOPHAGOGASTRODUODENOSCOPY);  Surgeon: Blane Valdez MD;  [facility]  Service: Endoscopy;  Laterality: N/A;     ESOPHAGOGASTRODUODENOSCOPY N/A 7/18/2019    Procedure: EGD (ESOPHAGOGASTRODUODENOSCOPY);  Surgeon: Blane Valdez MD;  Location: [facility] ENDO (2ND FLR);  Service: Endoscopy;  Laterality: N/A;    FOREIGN BODY REMOVAL N/A 7/22/2022    Procedure: REMOVAL, FOREIGN BODY;  Surgeon: Yg Kaufman MD;  Location: [facility] OR [facility] FLR;  Service: Cardiovascular;  Laterality: N/A;  LVAD Heartmate 2 drive line removal    INSERTION OF GRAFT TO PERICARDIUM N/A 7/15/2022    Procedure: INSERTION, GRAFT, PERICARDIUM;  Surgeon: Yg Kaufman MD;  Location: [facility] OR [facility] FLR;  Service: Cardiovascular;  Laterality: N/A;    IRRIGATION OF MEDIASTINUM N/A 7/15/2022    Procedure: IRRIGATION, MEDIASTINUM;  Surgeon: Yg Kaufman MD;  Location: [facility] OR [facility] FLR;  Service: Cardiovascular;  Laterality: N/A;    LYSIS OF ADHESIONS  7/13/2022    Procedure: LYSIS, ADHESIONS;  Surgeon: Yg Kaufman MD;  [facility];  Service: Cardiovascular;;    NONINVASIVE CARDIAC ELECTROPHYSIOLOGY STUDY N/A 10/18/2019    Procedure: CARDIAC ELECTROPHYSIOLOGY STUDY, NONINVASIVE;  Surgeon: Raz Wagner MD;  Location: [facility] EP LAB;  Service: Cardiology;  Laterality: N/A;  VT, DFTs, MDT CRTD in situ, LVAD, juarez, MB, 3098    REPLACEMENT OF IMPLANTABLE CARDIOVERTER-DEFIBRILLATOR (ICD) GENERATOR N/A 3/9/2020    Procedure: REPLACEMENT, ICD GENERATOR;  Surgeon: Harry Yun MD;  Location: [facility] EP LAB;  Service: Cardiology;  Laterality: N/A;  VT, ICD Gen Change and Lead Revision, MDT, MAC, DM,3 Prep    REPLACEMENT OF LEFT VENTRICULAR ASSIST DEVICE (LVAD)  7/13/2022    Procedure: REPLACEMENT, LVAD;  Surgeon: Yg Kaufman MD;  [facility];  Service: Cardiovascular;;    REPLACEMENT OF PUMP N/A 7/13/2022    Procedure: REPLACEMENT, PUMP;  Surgeon: Yg Kaufman MD;  [facility];  Service: Cardiovascular;  Laterality: N/A;  LVAD pump exchange  EXPLANATION OF HEATMATE 2  IMPLANTATION OF HEARTMATE 3  IMPLANTATION OF 8MM CHIMNEY GRAFT TO  RFA  INITIATION OF ECMO  TEMPORARY CLOSURE OF CHEST    REVISION OF IMPLANTABLE CARDIOVERTER-DEFIBRILLATOR (ICD) ELECTRODE LEAD PLACEMENT N/A 3/9/2020    Procedure: REVISION, INSERTION, ELECTRODE LEAD, ICD;  Surgeon: Harry Yun MD;  Location: [facility] EP LAB;  Service: Cardiology;  Laterality: N/A;  VT, ICD Gen Change and Lead Revision, MDT, MAC, DM,3 Prep    STERNAL WOUND CLOSURE N/A 7/14/2022    Procedure: CLOSURE, WOUND, STERNUM;  Surgeon: Yg Kaufman MD;  Location: [facility] OR [facility] FLR;  Service: Cardiovascular;  Laterality: N/A;  temporary closure  evacuation of hematoma    STERNAL WOUND CLOSURE N/A 7/15/2022    Procedure: CLOSURE, WOUND, STERNUM;  Surgeon: Yg Kaufman MD;  [facility];  Service: Cardiovascular;  Laterality: N/A;    TRACHEOSTOMY N/A 8/4/2022    Procedure: CREATION, TRACHEOSTOMY;  Surgeon: Germain Holt MD;  Location: [facility] OR [address] [place] [address];  Service: General;  Laterality: N/A;    TREATMENT OF CARDIAC ARRHYTHMIA  10/18/2019    Procedure: Cardioversion or Defibrillation;  Surgeon: Raz Wagner MD;  Location: [facility] EP LAB;  Service: Cardiology;;       Review of patient's allergies indicates:   Allergen Reactions    Lisinopril Anaphylaxis    Hydralazine analogues      Chronic constipation, impotence, dizziness       Medications:  Medications Prior to Admission   Medication Sig    ALPRAZolam (XANAX) 1 MG tablet TAKE 1 TABLET BY MOUTH TWICE A DAILY AS NEEDED FOR ANXIETY.    amiodarone (PACERONE) 200 MG Tab Take 2 tablets (400 mg total) by mouth once daily.    aspirin (ECOTRIN) 81 MG EC tablet Take 1 tablet (81 mg total) by mouth once daily.    doxycycline (VIBRAMYCIN) 100 MG Cap Take 1 capsule (100 mg total) by mouth 2 (two) times daily. for 10 days    levothyroxine (SYNTHROID) 50 MCG tablet Take 1 tablet (50 mcg total) by mouth before breakfast.    magnesium oxide (MAG-OX) 400 mg (241.3 mg magnesium) tablet Take 1 tablet (400 mg total) by mouth 2 (two) times daily.    mexiletine  (MEXITIL) 250 MG Cap Take 1 capsule (250 mg total) by mouth every 8 (eight) hours.    pantoprazole (PROTONIX) 40 MG tablet Take 1 tablet (40 mg total) by mouth daily with lunch.    warfarin (COUMADIN) 5 MG tablet Take 1.5 tablets (7.5 mg total) by mouth Daily.    mirtazapine (REMERON) 30 MG tablet Take 1 tablet (30 mg total) by mouth every evening.    omega-3 acid ethyl esters (LOVAZA) 1 gram capsule Take 2 capsules (2 g total) by mouth 2 (two) times daily.    polyethylene glycol (GLYCOLAX) 17 gram PwPk Take 17 g by mouth once daily.    torsemide (DEMADEX) 20 MG Tab TAKE 2 TABLETS BY MOUTH ONCE DAILY (Patient taking differently: daily as needed.)    zolpidem (AMBIEN CR) 12.5 MG CR tablet TAKE 1 TABLET (12.5 MG TOTAL) BY MOUTH NIGHTLY AS NEEDED FOR INSOMNIA. (Patient not taking: Reported on 9/22/2022)     Antibiotics (From admission, onward)      Start     Stop Route Frequency Ordered    09/24/22 2100  doxycycline tablet 100 mg         -- Oral Every 12 hours 09/24/22 1929          Antifungals (From admission, onward)      None          Antivirals (From admission, onward)      None             Immunization History   Administered Date(s) Administered    COVID-19, MRNA, LN-S, PF (MODERNA FULL 0.5 ML DOSE) 03/12/2021, 04/09/2021    COVID-19, MRNA, LN-S, PF (Pfizer) (Purple Cap) 12/04/2021    Hepatitis A / Hepatitis B 02/23/2018    Influenza 11/01/2014, 08/17/2019    Influenza - Quadrivalent - PF *Preferred* (6 months and older) 09/23/2015, 09/21/2016, 10/05/2017, 03/05/2021    Influenza - Trivalent - PF (PED) 11/01/2014    Pneumococcal Conjugate - 13 Valent 11/01/2014    Pneumococcal Polysaccharide - 23 Valent 10/01/2015, 03/24/2016    Tdap 02/23/2018       Family History       Problem Relation (Age of Onset)    Cancer Sister (54)    Coronary artery disease Father    Diabetes Father    Heart disease Father    Hypertension Father    No Known Problems Mother, Brother          Social History     Socioeconomic History     Marital status:     Number of children: 3   Occupational History     Employer: [employer]    Tobacco Use    Smoking status: Former     Packs/day: 1.00     Years: 31.00     Pack years: 31.00     Types: Cigarettes     Quit date: 2018     Years since quittin.7    Smokeless tobacco: Never    Tobacco comments:     pt is quiting on his own - pt stated not qualified for program;  pt  quit on his own   Substance and Sexual Activity    Alcohol use: No     Alcohol/week: 0.0 standard drinks     Comment: quit    Drug use: No    Sexual activity: Yes     Partners: Female     Birth control/protection: None     Comment: 10/5/17  with same partner 7 years    Social History Narrative    Disabled     Review of Systems   Constitutional:  Negative for chills and fever.   Skin:  Positive for wound.   All other systems reviewed and are negative.  Objective:     Vital Signs (Most Recent):  Temp: 98.2 °F (36.8 °C) (22 1045)  Pulse: 77 (22 1230)  Resp: 20 (22 1230)  BP: (!) 80/0 (22 1200)  SpO2: 96 % (22 1230)   Vital Signs (24h Range):  Temp:  [97.4 °F (36.3 °C)-98.6 °F (37 °C)] 98.2 °F (36.8 °C)  Pulse:  [] 77  Resp:  [12-31] 20  SpO2:  [92 %-100 %] 96 %  BP: (74-99)/(0-73) 80/0     Weight: 90.2 kg (198 lb 13.7 oz)  Body mass index is 26.24 kg/m².    Estimated Creatinine Clearance: 62.9 mL/min (A) (based on SCr of 1.5 mg/dL (H)).    Physical Exam  Constitutional:       General: He is not in acute distress.     Appearance: He is not ill-appearing or toxic-appearing.   HENT:      Head: Normocephalic and atraumatic.      Right Ear: External ear normal.      Left Ear: External ear normal.      Mouth/Throat:      Mouth: Mucous membranes are moist.      Pharynx: No oropharyngeal exudate or posterior oropharyngeal erythema.   Eyes:      General: No scleral icterus.        Right eye: No discharge.         Left eye: No discharge.   Neck:      Comments: Trach   Pulmonary:       Effort: Pulmonary effort is normal. No respiratory distress.      Breath sounds: No stridor. No wheezing or rhonchi.   Abdominal:      General: Bowel sounds are normal. There is no distension.      Palpations: Abdomen is soft.      Tenderness: There is no abdominal tenderness. There is no guarding.      Comments: VAD site dressed   Musculoskeletal:      Cervical back: No tenderness.      Right lower leg: No edema.      Left lower leg: No edema.   Skin:     General: Skin is warm and dry.      Coloration: Skin is not jaundiced.      Findings: No bruising, erythema or rash.      Comments: R groin - no purulent drainage or TTP   Neurological:      Mental Status: He is alert and oriented to person, place, and time. Mental status is at baseline.      Motor: No weakness.   Psychiatric:         Mood and Affect: Mood normal.         Behavior: Behavior normal.       Significant Labs:   Microbiology Results (last 7 days)       Procedure Component Value Units Date/Time    Fungus culture [[id number]] Collected: 09/25/22 0815    Order Status: Sent Specimen: Wound from Groin Updated: 09/25/22 0918    Culture, Anaerobe [[id number]] Collected: 09/25/22 0815    Order Status: Sent Specimen: Wound from Groin Updated: 09/25/22 0917    Aerobic culture [[id number]] Collected: 09/25/22 0815    Order Status: Sent Specimen: Wound from Groin Updated: 09/25/22 0917    Blood culture [[id number]] Collected: 09/25/22 0133    Order Status: Completed Specimen: Blood from Peripheral, Lower Arm, Left Updated: 09/25/22 0915     Blood Culture, Routine No Growth to date    Narrative:      Collection has been rescheduled by TR3 at 09/25/2022 00:31 Reason: Pt   dont randell get stuck beni tabor  Collection has been rescheduled by TR3 at 09/25/2022 00:31 Reason: Pt   dont wanna get stuck beni tabor    Blood culture [[id number]] Collected: 09/25/22 0138    Order Status: Completed Specimen: Blood from Peripheral, Hand, Right Updated: 09/25/22 0915     Blood  Culture, Routine No Growth to date    Narrative:      Collection has been rescheduled by TR3 at 09/25/2022 00:31 Reason: Pt   dont wanna get uck beni tabor  Collection has been rescheduled by TR3 at 09/25/2022 00:31 Reason: Pt   dont wanna get stuck beni tabor    Blood culture [[id number]]     Order Status: Canceled Specimen: Blood     Blood culture [[id number]]     Order Status: Canceled Specimen: Blood             Significant Imaging: I have reviewed all pertinent imaging results/findings within the past 24 hours.

## 2022-09-25 NOTE — ED NOTES
Telemetry Verification   Patient placed on Telemetry Box  Verified with War Room  Box # [id number]   Monitor Tech Jimmie   Rate    Rhythm

## 2022-09-25 NOTE — HPI
Tim Richards is a 56 y.o. man with history of combined systolic and diastolic heart failure with LVAD exchange 2/2 thrombosis complicated by cardioplegic shock requiring VA ECMO. Vascular surgery has been consulted for evaluation of right groin site concerning for pseudoaneurym. The patient states he in his usual state of health over the last 3 weeks that he has been out of the hospital when this morning he realized his right groin wound was bleeding. This wound was the site of the cardiopulmonary bypass access and subsequently the site for his VA ECMO. Per report, the patient had 3 days of purulence which was seen on exam at his office visit 9/22. He was started on doxycycline.     Patient denies any fevers, chills, sweats, fatigue. He has been active since discharge from rehab 9/1, denies lower extremity weakness or numbness.

## 2022-09-25 NOTE — ASSESSMENT & PLAN NOTE
Patient has [employer] SICD  Currently on amiodarone 400mg qd and mexiletine 250mg TID, will continue at home dose

## 2022-09-25 NOTE — PROGRESS NOTES
09/25/2022  Casper Harris    Current provider:  Gaurav Rodriguez MD    Device interrogation:  TXP LVAD INTERROGATIONS 9/25/2022 9/25/2022 9/25/2022 9/25/2022 9/25/2022 9/25/2022 9/25/2022   Type HeartMate3 HeartMate3 HeartMate3 HeartMate3 HeartMate3 HeartMate3 HeartMate3   Flow 5.4 5.4 5.3 5.2 5.4 5.4 5.4   Speed 6300 6300 6300 6300 6300 6300 6300   PI 1.6 1.6 1.6 1.5 1.7 1.5 1.4   Power (Jackson) 5.3 5.3 5.3 5.3 5.3 5.3 5.4   LSL 5900 5900 5900 5900 5900 5900 5900   Low Flow Alarm - - - - - - -   High Power Alarm - - - - - - -   Pulsatility - - - - - - -          Rounded on Tim Richards to ensure all mechanical assist device settings (IABP or VAD) were appropriate and all parameters were within limits.  I was able to ensure all back up equipment was present, the staff had no issues, and the Perfusion Department daily rounding was complete.      For implantable VADs: Interrogation of Ventricular assist device was performed with analysis of device parameters and review of device function. I have personally reviewed the interrogation findings and agree with findings as stated.     In emergency, the nursing units have been notified to contact the perfusion department either by:  Calling [email or web address] from 630am to 4pm Mon thru Fri, utilizing the On-Call Finder functionality of Epic and searching for Perfusion, or by contacting the hospital  from 4pm to 630am and on weekends and asking to speak with the perfusionist on call.    5:33 PM

## 2022-09-25 NOTE — ASSESSMENT & PLAN NOTE
Concern for infected pseudoaneurysm given purulent drainage noted over prior cannulation site. CTA with new dilatation of R fem artery. Blcx ngtd and wound cx in process. Surgical dispo pending.     Recommendations:  -stop doxycycline  -start empiric daptomycin and cefepime; avoiding vancomycin given contrast load   -monitor for toxicities CK weekly   -recommend intraop cultures and pathology to tailor abx therapy  -follow up cx

## 2022-09-25 NOTE — HPI
Tim Richards is a 56 y.o.  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  His post operative course was complicated by cardiogenic shock/RV failure requiring VA-ECMO.  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  He has a 8 Fr cuffed Shiley trach.  He had a prolonged and complicated hospital course from 6/24/22 - 9/4/22, was discharged to rehab (see clinic visit from 9/22/22 for additional details).  Of note he was last seen in clinic by Dr. Ragsdale on 9/22/22 after being discharged from rehab and at the time was noted to have purulence from the groin site and was placed on doxycycline PO.  He has had complaints of fatigue since his last hospitalization, per his wife.  This morning he was sitting and was crossing his legs for a prolonged period of time, and when he uncrossed them he noticed a pool of blood under him and active bleeding from his R groin where his previous ECMO cannula was.  He began to feel lightheaded when he tried walking.  Per wife he had 2 low flow alarms at home.      In the ED, patient stated he felt at his baseline and had no complaints.  No active bleeding at the time of my evaluation from R groin site.  Doppler BP was 80/0.  Hgb dropped from 9.4 -> 8.9 over the past 2 days.  INR was 3.0.  CT Angio performed per CV surgery recommendations.  HTS was consulted for admission, CT surgery was consulted in the ED for evaluation of groin hematoma.       Cardiovascular Studies  TTE 8/30/22  There is an LVAD present. Base speed is 6300 RPMs. The pump type is a Heartmate III. The interventricular septum appears midline. The aortic valve does not open.  The left ventricle is severely enlarged with eccentric hypertrophy and severely decreased systolic function.  The estimated ejection  fraction is 10%.  There is left ventricular global hypokinesis.  Left ventricular diastolic dysfunction.  There is trivial continuous aortic regurgitation.  There is abnormal septal wall motion.  The right ventricle is poorly seen, but appears enlarged with reduced systolic function.  Mild tricuspid regurgitation.

## 2022-09-25 NOTE — CONSULTS
Plastic Surgery Consultation    Date of Consultation:   09/25/2022    Reason for Consultation:  Groin wound, flap possible flap coverage of exposed vessels    History of Present Illness:  Tim Richards is a 56 y.o. 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 Vascular surgery evaluated and dx a pseudoaneurysm     Patient not currently bleeding, hemoglobin is stable, and INR supratherapeutic at 3. History of purulence from right groin also complicates decision making of what to do. Given patient's extensive comorbidities and high operative risk.     Past Medical History:    has a past medical history of A-fib, Anticoagulant long-term use, Atrial flutter (7/30/2022), CHF (congestive heart failure), Class 1 obesity due to excess calories with serious comorbidity and body mass index (BMI) of 31.0 to 31.9 in adult, Class 1 obesity due to excess calories with serious comorbidity and body mass index (BMI) of 32.0 to 32.9 in adult, Dilated cardiomyopathy (1/10/2018), Disorder of kidney and ureter, Encounter for blood transfusion, Essential hypertension (8/28/2022), Gout, HTN (hypertension), psychiatric care, ICD (implantable cardioverter-defibrillator) infection (7/1/2020), Psychiatric problem, Thyroid disease, and Ventricular tachycardia (paroxysmal).    Past Surgical History:    has a past surgical history that includes Cardiac catheterization (Dec. 2012); Cardiac defibrillator placement (Left); Colonoscopy (N/A, 3/6/2018); Esophagogastroduodenoscopy (N/A, 7/17/2019); Colonoscopy (N/A, 7/17/2019); Esophagogastroduodenoscopy (N/A, 7/18/2019); Colonoscopy (N/A, 7/18/2019); Noninvasive cardiac electrophysiology study (N/A, 10/18/2019); Treatment of cardiac arrhythmia (10/18/2019); Revision of implantable cardioverter-defibrillator (ICD) electrode lead placement (N/A,  3/9/2020); replacement of implantable cardioverter-defibrillator (icd) generator (N/A, 3/9/2020); Replacement of pump (N/A, 2022); Aortic valvuloplasty (N/A, 2022); Lysis of adhesions (2022); Replacement of left ventricular assist device (LVAD) (2022); Sternal wound closure (N/A, 2022); Sternal wound closure (N/A, 7/15/2022); Application of wound vacuum-assisted closure device (N/A, 7/15/2022); Insertion of graft to pericardium (N/A, 7/15/2022); Irrigation of mediastinum (N/A, 7/15/2022); Foreign Body Removal (N/A, 2022); and Tracheostomy (N/A, 2022).    Social History:  Social History     Tobacco Use    Smoking status: Former     Packs/day: 1.00     Years: 31.00     Pack years: 31.00     Types: Cigarettes     Quit date: 2018     Years since quittin.7    Smokeless tobacco: Never    Tobacco comments:     pt is quiting on his own - pt stated not qualified for program;  pt  quit on his own   Substance Use Topics    Alcohol use: No     Alcohol/week: 0.0 standard drinks     Comment: quit     Social History     Substance and Sexual Activity   Drug Use No       Family History:  Family History   Problem Relation Age of Onset    Hypertension Father     Diabetes Father     Coronary artery disease Father     Heart disease Father         CHF    No Known Problems Mother     Cancer Sister 54        breast CA    No Known Problems Brother     Anxiety disorder Neg Hx     Depression Neg Hx     Dementia Neg Hx     Bipolar disorder Neg Hx     Suicide Neg Hx        Allergies:  Review of patient's allergies indicates:   Allergen Reactions    Lisinopril Anaphylaxis    Hydralazine analogues      Chronic constipation, impotence, dizziness       Home Medications:  Prior to Admission medications    Medication Sig Start Date End Date Taking? Authorizing Provider   ALPRAZolam (XANAX) 1 MG tablet TAKE 1 TABLET BY MOUTH TWICE A DAILY AS NEEDED FOR ANXIETY. 22  Yes Fernando Nixon MD    amiodarone (PACERONE) 200 MG Tab Take 2 tablets (400 mg total) by mouth once daily. 9/22/22 9/22/23 Yes Roslyn Ragsdale DO   aspirin (ECOTRIN) 81 MG EC tablet Take 1 tablet (81 mg total) by mouth once daily. 9/22/22 9/22/23 Yes Roslyn Ragsdale DO   doxycycline (VIBRAMYCIN) 100 MG Cap Take 1 capsule (100 mg total) by mouth 2 (two) times daily. for 10 days 9/22/22 10/2/22 Yes Adriana Jimenez NP   levothyroxine (SYNTHROID) 50 MCG tablet Take 1 tablet (50 mcg total) by mouth before breakfast. 9/22/22 9/22/23 Yes Roslyn Ragsdale DO   magnesium oxide (MAG-OX) 400 mg (241.3 mg magnesium) tablet Take 1 tablet (400 mg total) by mouth 2 (two) times daily. 9/22/22  Yes Roslyn Ragsdale DO   mexiletine (MEXITIL) 250 MG Cap Take 1 capsule (250 mg total) by mouth every 8 (eight) hours. 9/1/22 9/1/23 Yes Loki Randall NP   pantoprazole (PROTONIX) 40 MG tablet Take 1 tablet (40 mg total) by mouth daily with lunch. 9/22/22  Yes Roslyn Ragsdale DO   warfarin (COUMADIN) 5 MG tablet Take 1.5 tablets (7.5 mg total) by mouth Daily. 9/22/22  Yes Roslyn Ragsdale DO   mirtazapine (REMERON) 30 MG tablet Take 1 tablet (30 mg total) by mouth every evening. 9/22/22 9/22/23  Roslyn Ragsdale DO   omega-3 acid ethyl esters (LOVAZA) 1 gram capsule Take 2 capsules (2 g total) by mouth 2 (two) times daily. 9/22/22 9/22/23  Roslyn Ragsdale DO   polyethylene glycol (GLYCOLAX) 17 gram PwPk Take 17 g by mouth once daily. 9/2/22   Amy Rhodes MD   torsemide (DEMADEX) 20 MG Tab TAKE 2 TABLETS BY MOUTH ONCE DAILY  Patient taking differently: daily as needed. 8/20/22   Amy Rhodes MD   zolpidem (AMBIEN CR) 12.5 MG CR tablet TAKE 1 TABLET (12.5 MG TOTAL) BY MOUTH NIGHTLY AS NEEDED FOR INSOMNIA.  Patient not taking: Reported on 9/22/2022 7/21/22   Fernando Nixon MD   ferrous gluconate (FERGON) 324 MG tablet TAKE 1 TABLET (324 MG TOTAL) BY MOUTH WITH LUNCH. 3/16/22 6/27/22  Amy Rhodes MD    sildenafiL (VIAGRA) 100 MG tablet Take 1 tablet (100 mg total) by mouth daily as needed for Erectile Dysfunction. 22  Guerda Bautista MD       Review of Systems:  Negative except for what is noted in HPI    Physical Exam:  VITAL SIGNS:   Vitals:    22 0900 22 0945 22 1000 22 1012   BP:  (!) 84/0 (!) 84/0 (!) 90/0   BP Location:  Right arm Right arm    Patient Position:  Lying Lying    Pulse: 106 76 68 80   Resp: 18 15 18 16   Temp:  98.3 °F (36.8 °C)  98.3 °F (36.8 °C)   TempSrc:  Oral     SpO2: 95% 95% 96% 96%   Weight:       Height:         TMAX: Temp (24hrs), Av.2 °F (36.8 °C), Min:97.4 °F (36.3 °C), Max:98.6 °F (37 °C)    Physical Exam  Constitutional:       NAD  HENT:      Head: Normocephalic and atraumatic.      Abdominal:      General: Abdomen is flat.      Palpations: Abdomen is soft.      Comments: Previous drive line site open healing with granulation tissue, no purulence noted   Musculoskeletal:        Comments: 5/5 strength all 4 extremities, palpable femoral, PT and DP pulses bilaterally. Right groin cutdown site open with granulation tissue and thrombus in wound bed, no active bleeding.                  Diagnostic Data:  Recent Results (from the past 336 hour(s))   CBC auto differential    Collection Time: 22  5:14 AM   Result Value Ref Range    WBC 5.27 3.90 - 12.70 K/uL    Hemoglobin 7.2 (L) 14.0 - 18.0 g/dL    Hematocrit 26.4 (L) 40.0 - 54.0 %    Platelets 282 150 - 450 K/uL   CBC auto differential    Collection Time: 22  6:03 PM   Result Value Ref Range    WBC 5.75 3.90 - 12.70 K/uL    Hemoglobin 8.9 (L) 14.0 - 18.0 g/dL    Hematocrit 31.7 (L) 40.0 - 54.0 %    Platelets 335 150 - 450 K/uL   CBC auto differential    Collection Time: 22 12:25 PM   Result Value Ref Range    WBC 5.23 3.90 - 12.70 K/uL    Hemoglobin 9.4 (L) 14.0 - 18.0 g/dL    Hematocrit 33.8 (L) 40.0 - 54.0 %    Platelets 400 150 - 450 K/uL     No results found for this or any  previous visit (from the past 336 hour(s)).  Lab Results   Component Value Date    ALBUMIN 2.3 (L) 09/25/2022     Lab Results   Component Value Date    CRP 30.6 (H) 09/22/2022     Lab Results   Component Value Date    INR 2.8 (H) 09/25/2022     No results found for: PTT    Microbiology Results (last 7 days)       Procedure Component Value Units Date/Time    Fungus culture [[id number]] Collected: 09/25/22 0815    Order Status: Sent Specimen: Wound from Groin Updated: 09/25/22 0918    Culture, Anaerobe [[id number]] Collected: 09/25/22 0815    Order Status: Sent Specimen: Wound from Groin Updated: 09/25/22 0917    Aerobic culture [[id number]] Collected: 09/25/22 0815    Order Status: Sent Specimen: Wound from Groin Updated: 09/25/22 0917    Blood culture [[id number]] Collected: 09/25/22 0133    Order Status: Completed Specimen: Blood from Peripheral, Lower Arm, Left Updated: 09/25/22 0915     Blood Culture, Routine No Growth to date    Narrative:      Collection has been rescheduled by TR3 at 09/25/2022 00:31 Reason: Pt   dont wanna get stuck rn rachille  Collection has been rescheduled by TR3 at 09/25/2022 00:31 Reason: Pt   dont wanna get stuck rn rachille    Blood culture [[id number]] Collected: 09/25/22 0138    Order Status: Completed Specimen: Blood from Peripheral, Hand, Right Updated: 09/25/22 0915     Blood Culture, Routine No Growth to date    Narrative:      Collection has been rescheduled by TR3 at 09/25/2022 00:31 Reason: Pt   dont wanna get stuck rn rachille  Collection has been rescheduled by TR3 at 09/25/2022 00:31 Reason: Pt   dont wanna get stuck rn rachille    Blood culture [[id number]]     Order Status: Canceled Specimen: Blood     Blood culture [[id number]]     Order Status: Canceled Specimen: Blood             Assessment:  56 y.o.male with right groin pseudoaneurysm(s). Vascular surgery to take patient to the operating room early this week. They have asked for assistance with possible flap coverage in the  groin.    Plan:  Will be available to assist Vascular surgery in the OR (9/27/22) after debridement and pseudoaneurysm repair. Sartorius vs. Rectus vs other local flap.

## 2022-09-25 NOTE — ASSESSMENT & PLAN NOTE
Currently appears clinically euvolemic  Taking torsemide 20mg daily, continue at this dose  Maintain K>4.0, Mg>2.0

## 2022-09-25 NOTE — PROGRESS NOTES
[facility] - Cardiac Intensive Care  Heart Transplant  Progress Note    Patient Name: Tim Richards  MRN: [id number]  Admission Date: 9/24/2022  Hospital Length of Stay: 1 days  Attending Physician: Gaurav Rodriguez MD  Primary Care Provider: Deyanira Booth MD  Principal Problem:<principal problem not specified>    Subjective:     **Interval History: No complaints. Hgb trending down at 7.2 - giving 1 unit PC's. Decision regarding plan to repair PSA's of right femoral artery are pending. Coumadin is on hold, INR down a bit at 2.8.     Continuous Infusions:  Scheduled Meds:   amiodarone  400 mg Oral Daily    doxycycline  100 mg Oral Q12H    levothyroxine  50 mcg Oral Before breakfast    magnesium oxide  400 mg Oral BID    mexiletine  250 mg Oral Q8H    mirtazapine  30 mg Oral QHS    pantoprazole  40 mg Oral Daily with lunch    polyethylene glycol  17 g Oral Daily    torsemide  20 mg Oral Daily     PRN Meds:sodium chloride, ALPRAZolam, hydrALAZINE, zolpidem    Review of patient's allergies indicates:   Allergen Reactions    Lisinopril Anaphylaxis    Hydralazine analogues      Chronic constipation, impotence, dizziness     Objective:     Vital Signs (Most Recent):  Temp: 98.3 °F (36.8 °C) (09/25/22 0945)  Pulse: 76 (09/25/22 0945)  Resp: 15 (09/25/22 0945)  BP: (!) 84/0 (09/25/22 0945)  SpO2: 95 % (09/25/22 0945)   Vital Signs (24h Range):  Temp:  [97.4 °F (36.3 °C)-98.6 °F (37 °C)] 98.3 °F (36.8 °C)  Pulse:  [30-83] 76  Resp:  [15-31] 15  SpO2:  [92 %-100 %] 95 %  BP: (74-99)/(0-73) 84/0     Patient Vitals for the past 72 hrs (Last 3 readings):   Weight   09/25/22 0105 90.2 kg (198 lb 13.7 oz)   09/24/22 2136 90.2 kg (198 lb 13.7 oz)   09/24/22 1758 86.2 kg (190 lb)     Body mass index is 26.24 kg/m².      Intake/Output Summary (Last 24 hours) at 9/25/2022 1021  Last data filed at 9/25/2022 0600  Gross per 24 hour   Intake 290 ml   Output 300 ml   Net -10 ml       Hemodynamic Parameters:       Telemetry:  SR    Physical Exam  Constitutional:       Appearance: Normal appearance.   HENT:      Head: Normocephalic and atraumatic.   Eyes:      Extraocular Movements: Extraocular movements intact.      Pupils: Pupils are equal, round, and reactive to light.   Neck:      Comments: Neck veins are not elevated  Cardiovascular:      Rate and Rhythm: Normal rate and regular rhythm.      Comments: Smooth VAD hum  Pulmonary:      Effort: Pulmonary effort is normal.      Breath sounds: Normal breath sounds.   Abdominal:      General: Bowel sounds are normal.      Palpations: Abdomen is soft.   Musculoskeletal:         General: No swelling. Normal range of motion.      Cervical back: Normal range of motion and neck supple.   Skin:     General: Skin is warm and dry.      Capillary Refill: Capillary refill takes 2 to 3 seconds.   Neurological:      General: No focal deficit present.      Mental Status: He is alert and oriented to person, place, and time.   Psychiatric:         Mood and Affect: Mood normal.         Behavior: Behavior normal.         Thought Content: Thought content normal.         Judgment: Judgment normal.       Significant Labs:  CBC:  Recent Labs   Lab 09/22/22 1225 09/24/22 1803 09/24/22 1815 09/25/22  0514   WBC 5.23 5.75  --  5.27   RBC 3.70* 3.47*  --  2.85*   HGB 9.4* 8.9*  --  7.2*   HCT 33.8* 31.7* 28* 26.4*    335  --  282   MCV 91 91  --  93   MCH 25.4* 25.6*  --  25.3*   MCHC 27.8* 28.1*  --  27.3*     BNP:  Recent Labs   Lab 09/22/22 1225 09/24/22  1803   * 229*     CMP:  Recent Labs   Lab 09/22/22 1225 09/24/22  1803 09/25/22  0903   GLU 67* 79 74   CALCIUM 9.2 8.9 8.4*   ALBUMIN 2.9* 2.6* 2.3*   PROT 7.2 6.5 5.8*    139 137   K 4.1 4.3 4.7   CO2 28 22* 24    106 106   BUN 10 11 11   CREATININE 1.4 1.6* 1.5*   ALKPHOS 110 94 81   ALT 19 20 16   AST 31 31 31   BILITOT 0.5 0.4 0.4      Coagulation:   Recent Labs   Lab 09/22/22 1225 09/24/22  1803 09/25/22  0903   INR 2.9*  3.0* 2.8*     LDH:  Recent Labs   Lab 09/22/22  1225   *     Microbiology:  Microbiology Results (last 7 days)       Procedure Component Value Units Date/Time    Fungus culture [[id number]] Collected: 09/25/22 0815    Order Status: Sent Specimen: Wound from Groin Updated: 09/25/22 0918    Culture, Anaerobe [[id number]] Collected: 09/25/22 0815    Order Status: Sent Specimen: Wound from Groin Updated: 09/25/22 0917    Aerobic culture [[id number]] Collected: 09/25/22 0815    Order Status: Sent Specimen: Wound from Groin Updated: 09/25/22 0917    Blood culture [[id number]] Collected: 09/25/22 0133    Order Status: Completed Specimen: Blood from Peripheral, Lower Arm, Left Updated: 09/25/22 0915     Blood Culture, Routine No Growth to date    Narrative:      Collection has been rescheduled by TR3 at 09/25/2022 00:31 Reason: Pt   dont wanna get stuck rn rachille  Collection has been rescheduled by TR3 at 09/25/2022 00:31 Reason: Pt   dont wanna get stuck rn rachille    Blood culture [[id number]] Collected: 09/25/22 0138    Order Status: Completed Specimen: Blood from Peripheral, Hand, Right Updated: 09/25/22 0915     Blood Culture, Routine No Growth to date    Narrative:      Collection has been rescheduled by TR3 at 09/25/2022 00:31 Reason: Pt   dont wanna get stuck rn rachille  Collection has been rescheduled by TR3 at 09/25/2022 00:31 Reason: Pt   dont wanna get stuck rn rachille    Blood culture [[id number]]     Order Status: Canceled Specimen: Blood     Blood culture [[id number]]     Order Status: Canceled Specimen: Blood             I have reviewed all pertinent labs within the past 24 hours.    Estimated Creatinine Clearance: 62.9 mL/min (A) (based on SCr of 1.5 mg/dL (H)).    Diagnostic Results:  I have reviewed and interpreted all pertinent imaging results/findings within the past 24 hours.    Assessment and Plan:     Tim Richards is a 56 y.o.  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  His post operative course was complicated by cardiogenic shock/RV failure requiring VA-ECMO.  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  He has a 8 Fr cuffed Shiley trach.  He had a prolonged and complicated hospital course from 6/24/22 - 9/4/22, was discharged to rehab (see clinic visit from 9/22/22 for additional details).  Of note he was last seen in clinic by Dr. Ragsdale on 9/22/22 after being discharged from rehab and at the time was noted to have purulence from the groin site and was placed on doxycycline PO.  He has had complaints of fatigue since his last hospitalization, per his wife.  This morning he was sitting and was crossing his legs for a prolonged period of time, and when he uncrossed them he noticed a pool of blood under him and active bleeding from his R groin where his previous ECMO cannula was.  He began to feel lightheaded when he tried walking.  Per wife he had 2 low flow alarms at home.      In the ED, patient stated he felt at his baseline and had no complaints.  No active bleeding at the time of my evaluation from R groin site.  Doppler BP was 80/0.  Hgb dropped from 9.4 -> 8.9 over the past 2 days.  INR was 3.0.  CT Angio performed per CV surgery recommendations.  HTS was consulted for admission, CT surgery was consulted in the ED for evaluation of groin hematoma.       Cardiovascular Studies  TTE 8/30/22  There is an LVAD present. Base speed is 6300 RPMs. The pump type is a Heartmate III. The interventricular septum appears midline. The aortic valve does not open.  The left ventricle is severely enlarged with eccentric hypertrophy and severely decreased systolic function.  The estimated ejection fraction is 10%.  There is left ventricular global hypokinesis.  Left ventricular diastolic  dysfunction.  There is trivial continuous aortic regurgitation.  There is abnormal septal wall motion.  The right ventricle is poorly seen, but appears enlarged with reduced systolic function.  Mild tricuspid regurgitation.      Pseudoaneurysm of femoral artery  Vascular surgery consulted, appreciate recommendations  CTA shows 3 new pseudoaneurysms of R femoral artery  Continuing Doxy 100mg bid for now    Groin hematoma  Was on VA-ECMO at last hospitalization with successful decannulation  CTA reveals 3 possible new pseudoaneurysms, vascular surgery has been consulted for evaluation  INR 3 on admission, and Hgb stable. Holding warfarin at this time pending recommendations from vascular surgery  CTS consulted in the ED, recommends discussion with vascular surgery. No bleeding from site at this time.     VT (ventricular tachycardia)  Patient has [employer] SICD  Currently on amiodarone 400mg qd and mexiletine 250mg TID, will continue at home dose    LVAD (left ventricular assist device) present  Procedure: Device Interrogation Including analysis of device parameters  Current Settings: Ventricular Assist Device  Review of device function is stable/unstable stable    TXP LVAD INTERROGATIONS 9/24/2022 9/22/2022 9/1/2022 9/1/2022 9/1/2022 8/31/2022 8/31/2022   Type HeartMate3 - HeartMate3 HeartMate3 HeartMate3 HeartMate3 HeartMate3   Flow 4.5 - 5.8 5.6 5.5 5.6 5.4   Speed 6300 - 6300 6300 6300 6300 6300   PI 4.6 - 1.5 1.9 2.9 2.8 3.0   Power ([place]) 5.2 - 5.4 5.3 5.4 5.3 5.2   LSL - - 5900 - - - -   Low Flow Alarm - - - - no no no   High Power Alarm - - - - - - -   Pulsatility - No Pulse - - Intermittent pulse - -         Chronic combined systolic and diastolic heart failure  Currently appears clinically euvolemic  Taking torsemide 20mg daily, continue at this dose  Maintain K>4.0, Mg>2.0      Uninterrupted Critical Care/Counseling Time (not including procedures): 60 minutes      Maira Crum NP [id number]  Heart  Transplant  [address] [facility] - Cardiac Intensive Care

## 2022-09-25 NOTE — NURSING
"LVAD DLES dressing changed under sterile technique using VAD kit. DLES is a "2" with yellow scab noted around exit site. Pt tolerated well, no bleeding noted, no active drainage noted. Dressing due to be changed 9/25/22.     Right groin drsg and right abd drsg changed.    Will continue monitoring pt.    "

## 2022-09-25 NOTE — CONSULTS
[facility] - Cardiac Intensive Care  Infectious Disease  Consult Note    Patient Name: Tim Richards  MRN: [id number]  Admission Date: 9/24/2022  Hospital Length of Stay: 1 days  Attending Physician: Gaurav Rodriguez MD  Primary Care Provider: Deyanira Booth MD     Isolation Status: No active isolations    Patient information was obtained from patient, spouse/SO, past medical records and ER records.      Inpatient consult to Infectious Diseases  Consult performed by: Jessica Perez MD  Consult ordered by: Marlyn Mccabe MD    Inpatient consult to Infectious Diseases  Consult performed by: Jessica Perez MD  Consult ordered by: Maira Crum NP        Assessment/Plan:     Pseudoaneurysm of right femoral artery  Concern for infected pseudoaneurysm given purulent drainage noted over prior cannulation site. CTA with new dilatation of R fem artery. Blcx ngtd and wound cx in process. Surgical dispo pending.     Recommendations:  -stop doxycycline  -start empiric daptomycin and cefepime; avoiding vancomycin given contrast load   -monitor for toxicities CK weekly   -recommend intraop cultures and pathology to tailor abx therapy  -follow up cx    Groin hematoma  See pseudoaneurysm        Thank you for your consult. I will follow-up with patient. Please contact us if you have any additional questions. Above d/w primary team and vascular surgery.     Time: 70 minutes   50% of time spent on face-to-face counseling and coordination of care. Counseling included review of test results, diagnosis, and treatment plan with patient and/or family.        Jessica Perez MD  Infectious Disease  [facility] - Cardiac Intensive Care    Subjective:     Principal Problem: <principal problem not specified>    HPI: 55 yo male with complex medical history including HM3 2018 with recent prolonged hospital stay for COVID with prior hospital course notable for AV repair, redo sternotomy, explant of prior LVAD and Va-ECMO  (decannulated 7/19) with takeback for mediastinal washout/hematoma, sternal closure, creation of neopericardium, Kleb aerogenes VAP s/p treatment admitted for bleeding from prior ECMO cannula site. Pt reported that he was seen in HTS clinic on 9/22 and was noted to have purulent drainage. He was given a course of doxycycline. Admission notable for CTA with new dilatation of R fem artery in the region of prior soft tissue density in R groin from prior ECMO cannulation site. Surgical disposition pending. Blcx ngtd and wound cultures from R groin in process. Pt is currently on doxycycline.       Past Medical History:   Diagnosis Date    A-fib     Anticoagulant long-term use     Atrial flutter 7/30/2022    CHF (congestive heart failure)     Class 1 obesity due to excess calories with serious comorbidity and body mass index (BMI) of 31.0 to 31.9 in adult     Class 1 obesity due to excess calories with serious comorbidity and body mass index (BMI) of 32.0 to 32.9 in adult     Dilated cardiomyopathy 1/10/2018    Disorder of kidney and ureter     CKD    Encounter for blood transfusion     Essential hypertension 8/28/2022    Gout     HTN (hypertension)     Hx of psychiatric care     ICD (implantable cardioverter-defibrillator) infection 7/1/2020    Psychiatric problem     Thyroid disease     Ventricular tachycardia (paroxysmal)        Past Surgical History:   Procedure Laterality Date    AORTIC VALVULOPLASTY N/A 7/13/2022    Procedure: REPAIR, AORTIC VALVE;  Surgeon: Yg Kaufman MD;  Location: [facility] OR [address] [place] [address];  Service: Cardiovascular;  Laterality: N/A;    APPLICATION OF WOUND VACUUM-ASSISTED CLOSURE DEVICE N/A 7/15/2022    Procedure: APPLICATION, WOUND VAC;  Surgeon: Yg Kaufman MD;  Location: [facility] OR [address] [place] [address];  Service: Cardiovascular;  Laterality: N/A;  50 x 5 cm     CARDIAC CATHETERIZATION  Dec. 2012    CARDIAC DEFIBRILLATOR PLACEMENT Left     CRRT-D    COLONOSCOPY N/A 3/6/2018    Procedure:  COLONOSCOPY;  Surgeon: Alonso Bone MD;  Location: [facility] ENDO (2ND FLR);  Service: Endoscopy;  Laterality: N/A;    COLONOSCOPY N/A 7/17/2019    Procedure: COLONOSCOPY;  Surgeon: Blane Valdez MD;  Location: [facility] ENDO (2ND FLR);  Service: Endoscopy;  Laterality: N/A;    COLONOSCOPY N/A 7/18/2019    Procedure: COLONOSCOPY;  Surgeon: Blane Valdez MD;  Location: [facility] ENDO (2ND FLR);  Service: Endoscopy;  Laterality: N/A;    ESOPHAGOGASTRODUODENOSCOPY N/A 7/17/2019    Procedure: EGD (ESOPHAGOGASTRODUODENOSCOPY);  Surgeon: Blane Valdez MD;  Location: [facility] ENDO (2ND FLR);  Service: Endoscopy;  Laterality: N/A;    ESOPHAGOGASTRODUODENOSCOPY N/A 7/18/2019    Procedure: EGD (ESOPHAGOGASTRODUODENOSCOPY);  Surgeon: Blane Valdez MD;  Location: [facility] ENDO (2ND FLR);  Service: Endoscopy;  Laterality: N/A;    FOREIGN BODY REMOVAL N/A 7/22/2022    Procedure: REMOVAL, FOREIGN BODY;  Surgeon: Yg Kaufman MD;  Location: [facility] OR 2ND FLR;  Service: Cardiovascular;  Laterality: N/A;  LVAD Heartmate 2 drive line removal    INSERTION OF GRAFT TO PERICARDIUM N/A 7/15/2022    Procedure: INSERTION, GRAFT, PERICARDIUM;  Surgeon: Yg Kaufman MD;  Location: [facility] OR 2ND FLR;  Service: Cardiovascular;  Laterality: N/A;    IRRIGATION OF MEDIASTINUM N/A 7/15/2022    Procedure: IRRIGATION, MEDIASTINUM;  Surgeon: Yg Kaufman MD;  Location: [facility] OR 2ND FLR;  Service: Cardiovascular;  Laterality: N/A;    LYSIS OF ADHESIONS  7/13/2022    Procedure: LYSIS, ADHESIONS;  Surgeon: Yg Kaufman MD;  Location: [facility] OR 2ND FLR;  Service: Cardiovascular;;    NONINVASIVE CARDIAC ELECTROPHYSIOLOGY STUDY N/A 10/18/2019    Procedure: CARDIAC ELECTROPHYSIOLOGY STUDY, NONINVASIVE;  Surgeon: Raz Wagner MD;  Location: [facility] EP LAB;  Service: Cardiology;  Laterality: N/A;  VT, DFTs, MDT CRTD in situ, LVAD, anes, MB, 3098    REPLACEMENT OF IMPLANTABLE CARDIOVERTER-DEFIBRILLATOR (ICD) GENERATOR N/A 3/9/2020    Procedure: REPLACEMENT, ICD GENERATOR;   Surgeon: Harry Yun MD;  Location: [facility] EP LAB;  Service: Cardiology;  Laterality: N/A;  VT, ICD Gen Change and Lead Revision, MDT, MAC, DM,3 Prep    REPLACEMENT OF LEFT VENTRICULAR ASSIST DEVICE (LVAD)  7/13/2022    Procedure: REPLACEMENT, LVAD;  Surgeon: Yg Kaufman MD;  [facility];  Service: Cardiovascular;;    REPLACEMENT OF PUMP N/A 7/13/2022    Procedure: REPLACEMENT, PUMP;  Surgeon: Yg Kaufman MD;  [facility];  Service: Cardiovascular;  Laterality: N/A;  LVAD pump exchange  EXPLANATION OF HEATMATE 2  IMPLANTATION OF HEARTMATE 3  IMPLANTATION OF 8MM CHIMNEY GRAFT TO RFA  INITIATION OF ECMO  TEMPORARY CLOSURE OF CHEST    REVISION OF IMPLANTABLE CARDIOVERTER-DEFIBRILLATOR (ICD) ELECTRODE LEAD PLACEMENT N/A 3/9/2020    Procedure: REVISION, INSERTION, ELECTRODE LEAD, ICD;  Surgeon: Harry Yun MD;  Location: [facility] EP LAB;  Service: Cardiology;  Laterality: N/A;  VT, ICD Gen Change and Lead Revision, MDT, MAC, DM,3 Prep    STERNAL WOUND CLOSURE N/A 7/14/2022    Procedure: CLOSURE, WOUND, STERNUM;  Surgeon: Yg Kaufman MD;  [facility];  Service: Cardiovascular;  Laterality: N/A;  temporary closure  evacuation of hematoma    STERNAL WOUND CLOSURE N/A 7/15/2022    Procedure: CLOSURE, WOUND, STERNUM;  Surgeon: Yg Kaufman MD;  [facility];  Service: Cardiovascular;  Laterality: N/A;    TRACHEOSTOMY N/A 8/4/2022    Procedure: CREATION, TRACHEOSTOMY;  Surgeon: Germain Holt MD;  Location: [facility] OR [address] [place] [address];  Service: General;  Laterality: N/A;    TREATMENT OF CARDIAC ARRHYTHMIA  10/18/2019    Procedure: Cardioversion or Defibrillation;  Surgeon: Raz Wagner MD;  Location: [facility] EP LAB;  Service: Cardiology;;       Review of patient's allergies indicates:   Allergen Reactions    Lisinopril Anaphylaxis    Hydralazine analogues      Chronic constipation, impotence, dizziness       Medications:  Medications Prior to Admission    Medication Sig    ALPRAZolam (XANAX) 1 MG tablet TAKE 1 TABLET BY MOUTH TWICE A DAILY AS NEEDED FOR ANXIETY.    amiodarone (PACERONE) 200 MG Tab Take 2 tablets (400 mg total) by mouth once daily.    aspirin (ECOTRIN) 81 MG EC tablet Take 1 tablet (81 mg total) by mouth once daily.    doxycycline (VIBRAMYCIN) 100 MG Cap Take 1 capsule (100 mg total) by mouth 2 (two) times daily. for 10 days    levothyroxine (SYNTHROID) 50 MCG tablet Take 1 tablet (50 mcg total) by mouth before breakfast.    magnesium oxide (MAG-OX) 400 mg (241.3 mg magnesium) tablet Take 1 tablet (400 mg total) by mouth 2 (two) times daily.    mexiletine (MEXITIL) 250 MG Cap Take 1 capsule (250 mg total) by mouth every 8 (eight) hours.    pantoprazole (PROTONIX) 40 MG tablet Take 1 tablet (40 mg total) by mouth daily with lunch.    warfarin (COUMADIN) 5 MG tablet Take 1.5 tablets (7.5 mg total) by mouth Daily.    mirtazapine (REMERON) 30 MG tablet Take 1 tablet (30 mg total) by mouth every evening.    omega-3 acid ethyl esters (LOVAZA) 1 gram capsule Take 2 capsules (2 g total) by mouth 2 (two) times daily.    polyethylene glycol (GLYCOLAX) 17 gram PwPk Take 17 g by mouth once daily.    torsemide (DEMADEX) 20 MG Tab TAKE 2 TABLETS BY MOUTH ONCE DAILY (Patient taking differently: daily as needed.)    zolpidem (AMBIEN CR) 12.5 MG CR tablet TAKE 1 TABLET (12.5 MG TOTAL) BY MOUTH NIGHTLY AS NEEDED FOR INSOMNIA. (Patient not taking: Reported on 9/22/2022)     Antibiotics (From admission, onward)      Start     Stop Route Frequency Ordered    09/24/22 2100  doxycycline tablet 100 mg         -- Oral Every 12 hours 09/24/22 1929          Antifungals (From admission, onward)      None          Antivirals (From admission, onward)      None             Immunization History   Administered Date(s) Administered    COVID-19, MRNA, LN-S, PF (MODERNA FULL 0.5 ML DOSE) 03/12/2021, 04/09/2021    COVID-19, MRNA, LN-S, PF (Pfizer) (Purple Cap)  2021    Hepatitis A / Hepatitis B 2018    Influenza 2014, 2019    Influenza - Quadrivalent - PF *Preferred* (6 months and older) 2015, 2016, 10/05/2017, 2021    Influenza - Trivalent - PF (PED) 2014    Pneumococcal Conjugate - 13 Valent 2014    Pneumococcal Polysaccharide - 23 Valent 10/01/2015, 2016    Tdap 2018       Family History       Problem Relation (Age of Onset)    Cancer Sister (54)    Coronary artery disease Father    Diabetes Father    Heart disease Father    Hypertension Father    No Known Problems Mother, Brother          Social History     Socioeconomic History    Marital status:     Number of children: 3   Occupational History     Employer: [employer]    Tobacco Use    Smoking status: Former     Packs/day: 1.00     Years: 31.00     Pack years: 31.00     Types: Cigarettes     Quit date: 2018     Years since quittin.7    Smokeless tobacco: Never    Tobacco comments:     pt is quiting on his own - pt stated not qualified for program;  pt  quit on his own   Substance and Sexual Activity    Alcohol use: No     Alcohol/week: 0.0 standard drinks     Comment: quit    Drug use: No    Sexual activity: Yes     Partners: Female     Birth control/protection: None     Comment: 10/5/17  with same partner 7 years    Social History Narrative    Disabled     Review of Systems   Constitutional:  Negative for chills and fever.   Skin:  Positive for wound.   All other systems reviewed and are negative.  Objective:     Vital Signs (Most Recent):  Temp: 98.2 °F (36.8 °C) (22 1045)  Pulse: 77 (22 1230)  Resp: 20 (22 1230)  BP: (!) 80/0 (22 1200)  SpO2: 96 % (22 1230)   Vital Signs (24h Range):  Temp:  [97.4 °F (36.3 °C)-98.6 °F (37 °C)] 98.2 °F (36.8 °C)  Pulse:  [] 77  Resp:  [12-31] 20  SpO2:  [92 %-100 %] 96 %  BP: (74-99)/(0-73) 80/0     Weight: 90.2 kg (198 lb 13.7  oz)  Body mass index is 26.24 kg/m².    Estimated Creatinine Clearance: 62.9 mL/min (A) (based on SCr of 1.5 mg/dL (H)).    Physical Exam  Constitutional:       General: He is not in acute distress.     Appearance: He is not ill-appearing or toxic-appearing.   HENT:      Head: Normocephalic and atraumatic.      Right Ear: External ear normal.      Left Ear: External ear normal.      Mouth/Throat:      Mouth: Mucous membranes are moist.      Pharynx: No oropharyngeal exudate or posterior oropharyngeal erythema.   Eyes:      General: No scleral icterus.        Right eye: No discharge.         Left eye: No discharge.   Neck:      Comments: Trach   Pulmonary:      Effort: Pulmonary effort is normal. No respiratory distress.      Breath sounds: No stridor. No wheezing or rhonchi.   Abdominal:      General: Bowel sounds are normal. There is no distension.      Palpations: Abdomen is soft.      Tenderness: There is no abdominal tenderness. There is no guarding.      Comments: VAD site dressed   Musculoskeletal:      Cervical back: No tenderness.      Right lower leg: No edema.      Left lower leg: No edema.   Skin:     General: Skin is warm and dry.      Coloration: Skin is not jaundiced.      Findings: No bruising, erythema or rash.      Comments: R groin - no purulent drainage or TTP   Neurological:      Mental Status: He is alert and oriented to person, place, and time. Mental status is at baseline.      Motor: No weakness.   Psychiatric:         Mood and Affect: Mood normal.         Behavior: Behavior normal.       Significant Labs:   Microbiology Results (last 7 days)       Procedure Component Value Units Date/Time    Fungus culture [[id number]] Collected: 09/25/22 0815    Order Status: Sent Specimen: Wound from Groin Updated: 09/25/22 0918    Culture, Anaerobe [[id number]] Collected: 09/25/22 0815    Order Status: Sent Specimen: Wound from Groin Updated: 09/25/22 0917    Aerobic culture [[id number]] Collected: 09/25/22  0815    Order Status: Sent Specimen: Wound from Groin Updated: 09/25/22 0917    Blood culture [[id number]] Collected: 09/25/22 0133    Order Status: Completed Specimen: Blood from Peripheral, Lower Arm, Left Updated: 09/25/22 0915     Blood Culture, Routine No Growth to date    Narrative:      Collection has been rescheduled by TR3 at 09/25/2022 00:31 Reason: Pt   dont randell get stuck rn sharona  Collection has been rescheduled by TR3 at 09/25/2022 00:31 Reason: Pt   dont wanna get stuck rn sharona    Blood culture [[id number]] Collected: 09/25/22 0138    Order Status: Completed Specimen: Blood from Peripheral, Hand, Right Updated: 09/25/22 0915     Blood Culture, Routine No Growth to date    Narrative:      Collection has been rescheduled by TR3 at 09/25/2022 00:31 Reason: Pt   dont wanna get stuck rn sharona  Collection has been rescheduled by TR3 at 09/25/2022 00:31 Reason: Pt   dont wanna get stuck rn sharona    Blood culture [[id number]]     Order Status: Canceled Specimen: Blood     Blood culture [[id number]]     Order Status: Canceled Specimen: Blood             Significant Imaging: I have reviewed all pertinent imaging results/findings within the past 24 hours.

## 2022-09-25 NOTE — CONSULTS
[facility] - Emergency Dept  Heart Transplant  Consult Note    Patient Name: Tim Richards  MRN: [id number]  Admission Date: 9/24/2022  Hospital Length of Stay: 0 days  Attending Physician: Casey Fontana MD  Primary Care Provider: Deyanira Booth MD   Principal Problem:<principal problem not specified>    Inpatient consult to Cardiology  Consult performed by: Noah Jama MD  Consult ordered by: Casey Fontana MD        Subjective:     History of Present Illness:  Tim Richards is a 56 y.o.  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 Post-op course was prolonged and complicated by worsening cardiogenic shock/RV failure requiring VA-ECMO that was successfully decannulated and Klebsiella aerogenes pneumonia. He also had episodes of VT with ICD discharge 7/21 requiring amio and NSVT episodes requiring addition of lidocaine gtt. This was transitioned to PO amiodarone and mexiletine. He unfortunately required re-intubation 7/29 for hypercapneic respiratory failure after initial extubation 7/27 and developed Aflutter with RVR with drop in PI and BP requiring DCCV. He ultimately underwent tracheostomy 8/4 and weaned off Epi 8/8. He briefly required TPN. He was transitioned back to enteral feeding and subsequently improved from a swallow standpoint and tolerated regular diet. He was having issues with vertigo-like symptoms with unremarkable ENT workup and negative CT heads. Due to higher prolonged doses of mexiletine during hospitalization, this was reduced to 250 mg TID. Amiodarone was adjusted after note of some CT liver parenchyma attenuation with report of copper/iron/med deposition but due to reduction of mexiletine, this was increased to 400 mg daily. The  CT had also shown possible signs suggestive of avascular necrosis of the femoral head and discussion with Endocrinology was held to wean steroid dosing for amiodarone-induced hyperthyroidism with recommendations for prednisone 5 mg daily to be continued through 8/31 then discontinued. He remained on methimazole. At the time of discharge, he had a size 8 cuffed Shiley trach with decision to downsize to be left as an outpatient. He was discharged on 9/1/2022.     Pt seen in clinic by Dr. Ragsdale last week after being discharged from rehab. Pt worked with home PT/OT. Old DLES evaluated by CTS in clinic as well- groin site evaluated with plans for antibiotics (doxycycline) .      Pt has had low energy since leaving the hospital per his wife. This morning pt was sitting and had been crossing his legs for a prolonged period of time. When he uncrossed his legs and stood up he realized he had a pool of blood under him and he was having active bleeding from his right groin where previous ECMO cannula was. He tried walking but then fell given he was light headed. Per wife pt had 2 low flow alarms at home. Doppler BP was 80 in the ED. Hgb 8.9 from 9.4 several days ago. INR 3. He reports compliance with his medications. Pt with [facility] as well (cr 1.6)      TTE 8/30/22   There is an LVAD present. Base speed is 6300 RPMs. The pump type is a Heartmate III. The interventricular septum appears midline. The aortic valve does not open.   The left ventricle is severely enlarged with eccentric hypertrophy and severely decreased systolic function.   The estimated ejection fraction is 10%.   There is left ventricular global hypokinesis.   Left ventricular diastolic dysfunction.   There is trivial continuous aortic regurgitation.   There is abnormal septal wall motion.   The right ventricle is poorly seen, but appears enlarged with reduced systolic function.   Mild tricuspid regurgitation.      Past Medical History:   Diagnosis  Date    A-fib     Anticoagulant long-term use     Atrial flutter 7/30/2022    CHF (congestive heart failure)     Class 1 obesity due to excess calories with serious comorbidity and body mass index (BMI) of 31.0 to 31.9 in adult     Class 1 obesity due to excess calories with serious comorbidity and body mass index (BMI) of 32.0 to 32.9 in adult     Dilated cardiomyopathy 1/10/2018    Disorder of kidney and ureter     CKD    Encounter for blood transfusion     Essential hypertension 8/28/2022    Gout     HTN (hypertension)     Hx of psychiatric care     ICD (implantable cardioverter-defibrillator) infection 7/1/2020    Psychiatric problem     Thyroid disease     Ventricular tachycardia (paroxysmal)        Past Surgical History:   Procedure Laterality Date    AORTIC VALVULOPLASTY N/A 7/13/2022    Procedure: REPAIR, AORTIC VALVE;  Surgeon: Yg Kaufman MD;  [facility];  Service: Cardiovascular;  Laterality: N/A;    APPLICATION OF WOUND VACUUM-ASSISTED CLOSURE DEVICE N/A 7/15/2022    Procedure: APPLICATION, WOUND VAC;  Surgeon: Yg Kaufman MD;  [facility];  Service: Cardiovascular;  Laterality: N/A;  50 x 5 cm     CARDIAC CATHETERIZATION  Dec. 2012    CARDIAC DEFIBRILLATOR PLACEMENT Left     CRRT-D    COLONOSCOPY N/A 3/6/2018    Procedure: COLONOSCOPY;  Surgeon: Alonso Bone MD;  Location: [facility] (2ND FLR);  Service: Endoscopy;  Laterality: N/A;    COLONOSCOPY N/A 7/17/2019    Procedure: COLONOSCOPY;  Surgeon: Blane Valdez MD;  Location: [facility] (2ND FLR);  Service: Endoscopy;  Laterality: N/A;    COLONOSCOPY N/A 7/18/2019    Procedure: COLONOSCOPY;  Surgeon: Blane Valdez MD;  Location: [facility] (2ND FLR);  Service: Endoscopy;  Laterality: N/A;    ESOPHAGOGASTRODUODENOSCOPY N/A 7/17/2019    Procedure: EGD (ESOPHAGOGASTRODUODENOSCOPY);  Surgeon: Blane Valdez MD;  Location: [facility] (2ND FLR);  Service: Endoscopy;  Laterality: N/A;     ESOPHAGOGASTRODUODENOSCOPY N/A 7/18/2019    Procedure: EGD (ESOPHAGOGASTRODUODENOSCOPY);  Surgeon: Blane Valdez MD;  Location: [facility] ENDO (2ND FLR);  Service: Endoscopy;  Laterality: N/A;    FOREIGN BODY REMOVAL N/A 7/22/2022    Procedure: REMOVAL, FOREIGN BODY;  Surgeon: Yg Kaufman MD;  Location: [facility] OR [facility] FLR;  Service: Cardiovascular;  Laterality: N/A;  LVAD Heartmate 2 drive line removal    INSERTION OF GRAFT TO PERICARDIUM N/A 7/15/2022    Procedure: INSERTION, GRAFT, PERICARDIUM;  Surgeon: Yg Kaufman MD;  Location: [facility] OR [facility] FLR;  Service: Cardiovascular;  Laterality: N/A;    IRRIGATION OF MEDIASTINUM N/A 7/15/2022    Procedure: IRRIGATION, MEDIASTINUM;  Surgeon: Yg Kaufman MD;  Location: [facility] OR [facility] FLR;  Service: Cardiovascular;  Laterality: N/A;    LYSIS OF ADHESIONS  7/13/2022    Procedure: LYSIS, ADHESIONS;  Surgeon: Yg Kaufman MD;  [facility];  Service: Cardiovascular;;    NONINVASIVE CARDIAC ELECTROPHYSIOLOGY STUDY N/A 10/18/2019    Procedure: CARDIAC ELECTROPHYSIOLOGY STUDY, NONINVASIVE;  Surgeon: Raz Wagner MD;  Location: [facility] EP LAB;  Service: Cardiology;  Laterality: N/A;  VT, DFTs, MDT CRTD in situ, LVAD, juarez, MB, 3098    REPLACEMENT OF IMPLANTABLE CARDIOVERTER-DEFIBRILLATOR (ICD) GENERATOR N/A 3/9/2020    Procedure: REPLACEMENT, ICD GENERATOR;  Surgeon: Harry Yun MD;  Location: [facility] EP LAB;  Service: Cardiology;  Laterality: N/A;  VT, ICD Gen Change and Lead Revision, MDT, MAC, DM,3 Prep    REPLACEMENT OF LEFT VENTRICULAR ASSIST DEVICE (LVAD)  7/13/2022    Procedure: REPLACEMENT, LVAD;  Surgeon: Yg Kaufman MD;  [facility];  Service: Cardiovascular;;    REPLACEMENT OF PUMP N/A 7/13/2022    Procedure: REPLACEMENT, PUMP;  Surgeon: Yg Kaufman MD;  [facility];  Service: Cardiovascular;  Laterality: N/A;  LVAD pump exchange  EXPLANATION OF HEATMATE 2  IMPLANTATION OF HEARTMATE 3  IMPLANTATION OF 8MM CHIMNEY  GRAFT TO RFA  INITIATION OF ECMO  TEMPORARY CLOSURE OF CHEST    REVISION OF IMPLANTABLE CARDIOVERTER-DEFIBRILLATOR (ICD) ELECTRODE LEAD PLACEMENT N/A 3/9/2020    Procedure: REVISION, INSERTION, ELECTRODE LEAD, ICD;  Surgeon: Harry Yun MD;  Location: [facility] EP LAB;  Service: Cardiology;  Laterality: N/A;  VT, ICD Gen Change and Lead Revision, MDT, MAC, DM,3 Prep    STERNAL WOUND CLOSURE N/A 7/14/2022    Procedure: CLOSURE, WOUND, STERNUM;  Surgeon: Yg Kaufman MD;  [facility]: [facility] OR [facility] F[facility];  Service: Cardiovascular;  Laterality: N/A;  temporary closure  evacuation of hematoma    STERNAL WOUND CLOSURE N/A 7/15/2022    Procedure: CLOSURE, WOUND, STERNUM;  Surgeon: Yg Kaufman MD;  Location: [facility] OR [facility] FLR;  Service: Cardiovascular;  Laterality: N/A;    TRACHEOSTOMY N/A 8/4/2022    Procedure: CREATION, TRACHEOSTOMY;  Surgeon: Germain Holt MD;  [facility];  Service: General;  Laterality: N/A;    TREATMENT OF CARDIAC ARRHYTHMIA  10/18/2019    Procedure: Cardioversion or Defibrillation;  Surgeon: Raz Wagner MD;  Location: [facility] EP LAB;  Service: Cardiology;;       Review of patient's allergies indicates:   Allergen Reactions    Lisinopril Anaphylaxis    Hydralazine analogues      Chronic constipation, impotence, dizziness       No current facility-administered medications for this encounter.     Current Outpatient Medications   Medication Sig    ALPRAZolam (XANAX) 1 MG tablet TAKE 1 TABLET BY MOUTH TWICE A DAILY AS NEEDED FOR ANXIETY.    amiodarone (PACERONE) 200 MG Tab Take 2 tablets (400 mg total) by mouth once daily.    aspirin (ECOTRIN) 81 MG EC tablet Take 1 tablet (81 mg total) by mouth once daily.    doxycycline (VIBRAMYCIN) 100 MG Cap Take 1 capsule (100 mg total) by mouth 2 (two) times daily. for 10 days    levothyroxine (SYNTHROID) 50 MCG tablet Take 1 tablet (50 mcg total) by mouth before breakfast.    magnesium oxide (MAG-OX) 400 mg (241.3 mg magnesium)  tablet Take 1 tablet (400 mg total) by mouth 2 (two) times daily.    mexiletine (MEXITIL) 250 MG Cap Take 1 capsule (250 mg total) by mouth every 8 (eight) hours.    pantoprazole (PROTONIX) 40 MG tablet Take 1 tablet (40 mg total) by mouth daily with lunch.    warfarin (COUMADIN) 5 MG tablet Take 1.5 tablets (7.5 mg total) by mouth Daily.    mirtazapine (REMERON) 30 MG tablet Take 1 tablet (30 mg total) by mouth every evening.    omega-3 acid ethyl esters (LOVAZA) 1 gram capsule Take 2 capsules (2 g total) by mouth 2 (two) times daily.    polyethylene glycol (GLYCOLAX) 17 gram PwPk Take 17 g by mouth once daily.    torsemide (DEMADEX) 20 MG Tab TAKE 2 TABLETS BY MOUTH ONCE DAILY (Patient taking differently: daily as needed.)    zolpidem (AMBIEN CR) 12.5 MG CR tablet TAKE 1 TABLET (12.5 MG TOTAL) BY MOUTH NIGHTLY AS NEEDED FOR INSOMNIA. (Patient not taking: Reported on 2022)     Family History       Problem Relation (Age of Onset)    Cancer Sister (54)    Coronary artery disease Father    Diabetes Father    Heart disease Father    Hypertension Father    No Known Problems Mother, Brother          Tobacco Use    Smoking status: Former     Packs/day: 1.00     Years: 31.00     Pack years: 31.00     Types: Cigarettes     Quit date: 2018     Years since quittin.7    Smokeless tobacco: Never    Tobacco comments:     pt is quiting on his own - pt stated not qualified for program;  pt  quit on his own   Substance and Sexual Activity    Alcohol use: No     Alcohol/week: 0.0 standard drinks     Comment: quit    Drug use: No    Sexual activity: Yes     Partners: Female     Birth control/protection: None     Comment: 10/5/17  with same partner 7 years      Review of Systems   Constitutional:  Positive for fatigue. Negative for activity change, appetite change and chills.   HENT:  Negative for congestion and dental problem.    Respiratory:  Negative for apnea, cough, choking, chest tightness  and shortness of breath.    Cardiovascular:  Negative for chest pain and leg swelling.   Gastrointestinal:  Negative for abdominal distention, abdominal pain, anal bleeding and blood in stool.   Endocrine: Negative for cold intolerance, heat intolerance and polydipsia.   Genitourinary:  Negative for difficulty urinating, dysuria, enuresis and flank pain.   Musculoskeletal:  Negative for arthralgias, back pain, gait problem and joint swelling.   Skin:  Negative for color change, pallor and rash.   Neurological:  Negative for dizziness, facial asymmetry, light-headedness and headaches.   Hematological:  Negative for adenopathy. Does not bruise/bleed easily.   Objective:     Vital Signs (Most Recent):  Pulse: 75 (09/24/22 1813)  Resp: 20 (09/24/22 1758)  SpO2: 100 % (09/24/22 1813) Vital Signs (24h Range):  Pulse:  [70-75] 75  Resp:  [20] 20  SpO2:  [100 %] 100 %     Patient Vitals for the past 72 hrs (Last 3 readings):   Weight   09/24/22 1758 86.2 kg (190 lb)     Body mass index is 25.07 kg/m².    No intake or output data in the 24 hours ending 09/24/22 1900    Physical Exam  Constitutional:       General: He is not in acute distress.     Appearance: Normal appearance. He is normal weight. He is not ill-appearing or toxic-appearing.   HENT:      Head: Normocephalic and atraumatic.      Nose: Nose normal. No congestion.      Mouth/Throat:      Mouth: Mucous membranes are moist.      Pharynx: No oropharyngeal exudate.   Eyes:      General:         Right eye: No discharge.         Left eye: No discharge.      Extraocular Movements: Extraocular movements intact.      Pupils: Pupils are equal, round, and reactive to light.   Cardiovascular:      Rate and Rhythm: Normal rate and regular rhythm.      Comments: VAD hum noted     Granulation tissue at right groin prior ECMO cannula site. 4 cm sized hematoma noted   Pulmonary:      Effort: Pulmonary effort is normal. No respiratory distress.      Breath sounds: Normal breath  sounds. No wheezing, rhonchi or rales.   Abdominal:      General: Abdomen is flat. Bowel sounds are normal. There is no distension.      Palpations: Abdomen is soft.      Tenderness: There is no abdominal tenderness.   Musculoskeletal:         General: No swelling. Normal range of motion.      Cervical back: Normal range of motion. No rigidity.      Right lower leg: No edema.      Left lower leg: No edema.   Skin:     General: Skin is warm and dry.      Findings: No lesion or rash.   Neurological:      General: No focal deficit present.      Mental Status: He is alert and oriented to person, place, and time.      Cranial Nerves: No cranial nerve deficit.      Motor: No weakness.       Significant Labs:  CBC:  Recent Labs   Lab 09/22/22 1225 09/24/22  1803 09/24/22  1815   WBC 5.23 5.75  --    RBC 3.70* 3.47*  --    HGB 9.4* 8.9*  --    HCT 33.8* 31.7* 28*    335  --    MCV 91 91  --    MCH 25.4* 25.6*  --    MCHC 27.8* 28.1*  --      BNP:  Recent Labs   Lab 09/22/22  1225 09/24/22  1803   * 229*     CMP:  Recent Labs   Lab 09/22/22  1225 09/24/22  1803   GLU 67* 79   CALCIUM 9.2 8.9   ALBUMIN 2.9* 2.6*   PROT 7.2 6.5    139   K 4.1 4.3   CO2 28 22*    106   BUN 10 11   CREATININE 1.4 1.6*   ALKPHOS 110 94   ALT 19 20   AST 31 31   BILITOT 0.5 0.4      Coagulation:   Recent Labs   Lab 09/22/22  1225 09/24/22  1803   INR 2.9* 3.0*     LDH:  Recent Labs   Lab 09/22/22  1225   *     Microbiology:  Microbiology Results (last 7 days)       ** No results found for the last 168 hours. **            BMP:   Recent Labs   Lab 09/22/22  1225 09/24/22  1803   GLU 67* 79    139   K 4.1 4.3    106   CO2 28 22*   BUN 10 11   CREATININE 1.4 1.6*   CALCIUM 9.2 8.9   MG 2.2  --      I have reviewed all pertinent labs within the past 24 hours.    Diagnostic Results:  Reviewed     Assessment/Plan:     Groin hematoma  INR 3, and Hgb stable. Pending CTA. CTS consulted in the ED. No bleeding  from site at this time.   -Will plan for admission to [facility] to monitor hematoma and Hgb.   -Will continue warfarin if not active bleed on CTA.     LVAD (left ventricular assist device) present  Procedure: Device Interrogation Including analysis of device parameters  Current Settings: Ventricular Assist Device  Review of device function is stable/unstable stable    TXP LVAD INTERROGATIONS 9/24/2022 9/22/2022 9/1/2022 9/1/2022 9/1/2022 8/31/2022 8/31/2022   Type HeartMate3 - HeartMate3 HeartMate3 HeartMate3 HeartMate3 HeartMate3   Flow 4.5 - 5.8 5.6 5.5 5.6 5.4   Speed 6300 - 6300 6300 6300 6300 6300   PI 4.6 - 1.5 1.9 2.9 2.8 3.0   Power ([place]) 5.2 - 5.4 5.3 5.4 5.3 5.2   LSL - - 5900 - - - -   Low Flow Alarm - - - - no no no   High Power Alarm - - - - - - -   Pulsatility - No Pulse - - Intermittent pulse - -             Thank you for your consult. I will follow-up with patient. Please contact us if you have any additional questions.    Noah Jama MD  Heart Transplant  [facility] Dept

## 2022-09-25 NOTE — ASSESSMENT & PLAN NOTE
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 Holding warfarin at this time pending recommendations from vascular surgery  CTS consulted in the ED, recommends discussion with vascular surgery. No bleeding from site at this time.

## 2022-09-25 NOTE — H&P
[facility] Stepdown  Heart Transplant  H&P    Patient Name: Tim Richards  MRN: [id number]  Admission Date: 9/24/2022  Attending Physician: Gaurav Rodriguez MD  Primary Care Provider: Deynaira Booth MD  Principal Problem:<principal problem not specified>    Subjective:     History of Present Illness:  Tim Richards is a 56 y.o.  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  His post operative course was complicated by cardiogenic shock/RV failure requiring VA-ECMO.  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  He has a 8 Fr cuffed Shiley trach.  He had a prolonged and complicated hospital course from 6/24/22 - 9/4/22, was discharged to rehab (see clinic visit from 9/22/22 for additional details).  Of note he was last seen in clinic by Dr. Ragsdale on 9/22/22 after being discharged from rehab and at the time was noted to have purulence from the groin site and was placed on doxycycline PO.  He has had complaints of fatigue since his last hospitalization, per his wife.  This morning he was sitting and was crossing his legs for a prolonged period of time, and when he uncrossed them he noticed a pool of blood under him and active bleeding from his R groin where his previous ECMO cannula was.  He began to feel lightheaded when he tried walking.  Per wife he had 2 low flow alarms at home.      In the ED, patient stated he felt at his baseline and had no complaints.  No active bleeding at the time of my evaluation from R groin site.  Doppler BP was 80/0.  Hgb dropped from 9.4 -> 8.9 over the past 2 days.  INR was 3.0.  CT Angio performed per CV surgery recommendations.  HTS was consulted for admission, CT surgery was consulted in the ED for evaluation of groin hematoma.       Cardiovascular  Studies  TTE 8/30/22   There is an LVAD present. Base speed is 6300 RPMs. The pump type is a Heartmate III. The interventricular septum appears midline. The aortic valve does not open.   The left ventricle is severely enlarged with eccentric hypertrophy and severely decreased systolic function.   The estimated ejection fraction is 10%.   There is left ventricular global hypokinesis.   Left ventricular diastolic dysfunction.   There is trivial continuous aortic regurgitation.   There is abnormal septal wall motion.   The right ventricle is poorly seen, but appears enlarged with reduced systolic function.   Mild tricuspid regurgitation.      Past Medical History:   Diagnosis Date    A-fib     Anticoagulant long-term use     Atrial flutter 7/30/2022    CHF (congestive heart failure)     Class 1 obesity due to excess calories with serious comorbidity and body mass index (BMI) of 31.0 to 31.9 in adult     Class 1 obesity due to excess calories with serious comorbidity and body mass index (BMI) of 32.0 to 32.9 in adult     Dilated cardiomyopathy 1/10/2018    Disorder of kidney and ureter     CKD    Encounter for blood transfusion     Essential hypertension 8/28/2022    Gout     HTN (hypertension)     Hx of psychiatric care     ICD (implantable cardioverter-defibrillator) infection 7/1/2020    Psychiatric problem     Thyroid disease     Ventricular tachycardia (paroxysmal)        Past Surgical History:   Procedure Laterality Date    AORTIC VALVULOPLASTY N/A 7/13/2022    Procedure: REPAIR, AORTIC VALVE;  Surgeon: Yg Kaufman MD;  Location: [facility] OR [address] [place] [address];  Service: Cardiovascular;  Laterality: N/A;    APPLICATION OF WOUND VACUUM-ASSISTED CLOSURE DEVICE N/A 7/15/2022    Procedure: APPLICATION, WOUND VAC;  Surgeon: Yg Kaufman MD;  Location: [facility] OR [address] [place] [address];  Service: Cardiovascular;  Laterality: N/A;  50 x 5 cm     CARDIAC CATHETERIZATION  Dec. 2012    CARDIAC DEFIBRILLATOR PLACEMENT Left      CRRT-D    COLONOSCOPY N/A 3/6/2018    Procedure: COLONOSCOPY;  Surgeon: Alonso Bone MD;  Location: [facility] ENDO (2ND FLR);  Service: Endoscopy;  Laterality: N/A;    COLONOSCOPY N/A 7/17/2019    Procedure: COLONOSCOPY;  Surgeon: Blane Valdez MD;  Location: [facility] ENDO (2ND FLR);  Service: Endoscopy;  Laterality: N/A;    COLONOSCOPY N/A 7/18/2019    Procedure: COLONOSCOPY;  Surgeon: Blane Valdez MD;  Location: [facility] ENDO (2ND FLR);  Service: Endoscopy;  Laterality: N/A;    ESOPHAGOGASTRODUODENOSCOPY N/A 7/17/2019    Procedure: EGD (ESOPHAGOGASTRODUODENOSCOPY);  Surgeon: Blane Valdez MD;  Location: [facility] ENDO (2ND FLR);  Service: Endoscopy;  Laterality: N/A;    ESOPHAGOGASTRODUODENOSCOPY N/A 7/18/2019    Procedure: EGD (ESOPHAGOGASTRODUODENOSCOPY);  Surgeon: Blane Valdez MD;  Location: [facility] ENDO (2ND FLR);  Service: Endoscopy;  Laterality: N/A;    FOREIGN BODY REMOVAL N/A 7/22/2022    Procedure: REMOVAL, FOREIGN BODY;  Surgeon: Yg Kaufman MD;  Location: [facility] OR 2ND FLR;  Service: Cardiovascular;  Laterality: N/A;  LVAD Heartmate 2 drive line removal    INSERTION OF GRAFT TO PERICARDIUM N/A 7/15/2022    Procedure: INSERTION, GRAFT, PERICARDIUM;  Surgeon: Yg Kaufman MD;  Location: [facility] OR 2ND FLR;  Service: Cardiovascular;  Laterality: N/A;    IRRIGATION OF MEDIASTINUM N/A 7/15/2022    Procedure: IRRIGATION, MEDIASTINUM;  Surgeon: Yg Kaufman MD;  Location: [facility] OR 2ND FLR;  Service: Cardiovascular;  Laterality: N/A;    LYSIS OF ADHESIONS  7/13/2022    Procedure: LYSIS, ADHESIONS;  Surgeon: Yg Kaufman MD;  Location: [facility] OR 2ND FLR;  Service: Cardiovascular;;    NONINVASIVE CARDIAC ELECTROPHYSIOLOGY STUDY N/A 10/18/2019    Procedure: CARDIAC ELECTROPHYSIOLOGY STUDY, NONINVASIVE;  Surgeon: Raz Wagner MD;  Location: [facility] EP LAB;  Service: Cardiology;  Laterality: N/A;  VT, DFTs, MDT CRTD in situ, LVAD, anes, MB, 8636    REPLACEMENT OF IMPLANTABLE CARDIOVERTER-DEFIBRILLATOR (ICD) GENERATOR N/A  3/9/2020    Procedure: REPLACEMENT, ICD GENERATOR;  Surgeon: Harry Yun MD;  Location: [facility] EP LAB;  Service: Cardiology;  Laterality: N/A;  VT, ICD Gen Change and Lead Revision, MDT, MAC, DM,3 Prep    REPLACEMENT OF LEFT VENTRICULAR ASSIST DEVICE (LVAD)  7/13/2022    Procedure: REPLACEMENT, LVAD;  Surgeon: Yg Kaufman MD;  [facility];  Service: Cardiovascular;;    REPLACEMENT OF PUMP N/A 7/13/2022    Procedure: REPLACEMENT, PUMP;  Surgeon: Yg Kaufman MD;  [facility];  Service: Cardiovascular;  Laterality: N/A;  LVAD pump exchange  EXPLANATION OF HEATMATE 2  IMPLANTATION OF HEARTMATE 3  IMPLANTATION OF 8MM CHIMNEY GRAFT TO RFA  INITIATION OF ECMO  TEMPORARY CLOSURE OF CHEST    REVISION OF IMPLANTABLE CARDIOVERTER-DEFIBRILLATOR (ICD) ELECTRODE LEAD PLACEMENT N/A 3/9/2020    Procedure: REVISION, INSERTION, ELECTRODE LEAD, ICD;  Surgeon: Harry Yun MD;  Location: [facility] EP LAB;  Service: Cardiology;  Laterality: N/A;  VT, ICD Gen Change and Lead Revision, MDT, MAC, DM,3 Prep    STERNAL WOUND CLOSURE N/A 7/14/2022    Procedure: CLOSURE, WOUND, STERNUM;  Surgeon: Yg Kaufman MD;  [facility];  Service: Cardiovascular;  Laterality: N/A;  temporary closure  evacuation of hematoma    STERNAL WOUND CLOSURE N/A 7/15/2022    Procedure: CLOSURE, WOUND, STERNUM;  Surgeon: Yg Kaufman MD;  [facility];  Service: Cardiovascular;  Laterality: N/A;    TRACHEOSTOMY N/A 8/4/2022    Procedure: CREATION, TRACHEOSTOMY;  Surgeon: Germain Holt MD;  [facility];  Service: General;  Laterality: N/A;    TREATMENT OF CARDIAC ARRHYTHMIA  10/18/2019    Procedure: Cardioversion or Defibrillation;  Surgeon: Raz Wagner MD;  Location: [facility] EP LAB;  Service: Cardiology;;       Review of patient's allergies indicates:   Allergen Reactions    Lisinopril Anaphylaxis    Hydralazine analogues      Chronic constipation, impotence,  dizziness       Current Facility-Administered Medications   Medication    ALPRAZolam tablet 1 mg    [START ON 9/25/2022] amiodarone tablet 400 mg    doxycycline tablet 100 mg    [START ON 9/25/2022] levothyroxine tablet 50 mcg    magnesium oxide tablet 400 mg    mexiletine capsule 250 mg    mirtazapine tablet 30 mg    [START ON 9/25/2022] pantoprazole EC tablet 40 mg    [START ON 9/25/2022] polyethylene glycol packet 17 g    zolpidem tablet 5 mg     Current Outpatient Medications   Medication Sig    ALPRAZolam (XANAX) 1 MG tablet TAKE 1 TABLET BY MOUTH TWICE A DAILY AS NEEDED FOR ANXIETY.    amiodarone (PACERONE) 200 MG Tab Take 2 tablets (400 mg total) by mouth once daily.    aspirin (ECOTRIN) 81 MG EC tablet Take 1 tablet (81 mg total) by mouth once daily.    doxycycline (VIBRAMYCIN) 100 MG Cap Take 1 capsule (100 mg total) by mouth 2 (two) times daily. for 10 days    levothyroxine (SYNTHROID) 50 MCG tablet Take 1 tablet (50 mcg total) by mouth before breakfast.    magnesium oxide (MAG-OX) 400 mg (241.3 mg magnesium) tablet Take 1 tablet (400 mg total) by mouth 2 (two) times daily.    mexiletine (MEXITIL) 250 MG Cap Take 1 capsule (250 mg total) by mouth every 8 (eight) hours.    pantoprazole (PROTONIX) 40 MG tablet Take 1 tablet (40 mg total) by mouth daily with lunch.    warfarin (COUMADIN) 5 MG tablet Take 1.5 tablets (7.5 mg total) by mouth Daily.    mirtazapine (REMERON) 30 MG tablet Take 1 tablet (30 mg total) by mouth every evening.    omega-3 acid ethyl esters (LOVAZA) 1 gram capsule Take 2 capsules (2 g total) by mouth 2 (two) times daily.    polyethylene glycol (GLYCOLAX) 17 gram PwPk Take 17 g by mouth once daily.    torsemide (DEMADEX) 20 MG Tab TAKE 2 TABLETS BY MOUTH ONCE DAILY (Patient taking differently: daily as needed.)    zolpidem (AMBIEN CR) 12.5 MG CR tablet TAKE 1 TABLET (12.5 MG TOTAL) BY MOUTH NIGHTLY AS NEEDED FOR INSOMNIA. (Patient not taking: Reported on  2022)     Family History       Problem Relation (Age of Onset)    Cancer Sister (54)    Coronary artery disease Father    Diabetes Father    Heart disease Father    Hypertension Father    No Known Problems Mother, Brother          Tobacco Use    Smoking status: Former     Packs/day: 1.00     Years: 31.00     Pack years: 31.00     Types: Cigarettes     Quit date: 2018     Years since quittin.7    Smokeless tobacco: Never    Tobacco comments:     pt is quiting on his own - pt stated not qualified for program;  pt  quit on his own   Substance and Sexual Activity    Alcohol use: No     Alcohol/week: 0.0 standard drinks     Comment: quit    Drug use: No    Sexual activity: Yes     Partners: Female     Birth control/protection: None     Comment: 10/5/17  with same partner 7 years      Review of Systems   Constitutional:  Positive for fatigue. Negative for activity change, fever and unexpected weight change.   Respiratory:  Negative for cough, chest tightness and shortness of breath.    Cardiovascular:  Negative for chest pain, palpitations and leg swelling.   Gastrointestinal:  Negative for abdominal distention, abdominal pain, constipation, nausea and vomiting.   Genitourinary:  Negative for decreased urine volume and difficulty urinating.   Skin:  Positive for wound.        Groin site with purulence and tenderness x 3 days   Objective:     Vital Signs (Most Recent):  Temp: 98.2 °F (36.8 °C) (22)  Pulse: 74 (22)  Resp: 18 (22)  BP: (!) 74/0 (22)  SpO2: 98 % (22)   Vital Signs (24h Range):  Temp:  [98.2 °F (36.8 °C)] 98.2 °F (36.8 °C)  Pulse:  [70-75] 74  Resp:  [18-20] 18  SpO2:  [98 %-100 %] 98 %  BP: (74)/(0) 74/0     Patient Vitals for the past 72 hrs (Last 3 readings):   Weight   22 1758 86.2 kg (190 lb)     Body mass index is 25.07 kg/m².    No intake or output data in the 24 hours ending 22    Physical  Exam  Constitutional:       Appearance: Normal appearance.   Eyes:      Extraocular Movements: Extraocular movements intact.      Conjunctiva/sclera: Conjunctivae normal.   Cardiovascular:      Rate and Rhythm: Normal rate.      Pulses: Normal pulses.      Comments: VAD hum can be heard  Pulmonary:      Effort: Pulmonary effort is normal.      Breath sounds: Normal breath sounds.   Abdominal:      General: Bowel sounds are normal.      Comments: R abdominal old DLES wound with healthy granulation tissue, L sided DLES site clean dry and intact   Genitourinary:     Comments: R groin covered in dressing, no active bleeding from site, tender to palpation  Musculoskeletal:      Cervical back: Normal range of motion and neck supple.   Skin:     Capillary Refill: Capillary refill takes 2 to 3 seconds.   Neurological:      General: No focal deficit present.      Mental Status: He is alert.   Psychiatric:         Mood and Affect: Mood normal.         Behavior: Behavior normal.       Significant Labs:  CBC:  Recent Labs   Lab 09/22/22  1225 09/24/22  1803 09/24/22  1815   WBC 5.23 5.75  --    RBC 3.70* 3.47*  --    HGB 9.4* 8.9*  --    HCT 33.8* 31.7* 28*    335  --    MCV 91 91  --    MCH 25.4* 25.6*  --    MCHC 27.8* 28.1*  --      BNP:  Recent Labs   Lab 09/22/22  1225 09/24/22  1803   * 229*     CMP:  Recent Labs   Lab 09/22/22  1225 09/24/22  1803   GLU 67* 79   CALCIUM 9.2 8.9   ALBUMIN 2.9* 2.6*   PROT 7.2 6.5    139   K 4.1 4.3   CO2 28 22*    106   BUN 10 11   CREATININE 1.4 1.6*   ALKPHOS 110 94   ALT 19 20   AST 31 31   BILITOT 0.5 0.4      Coagulation:   Recent Labs   Lab 09/22/22  1225 09/24/22  1803   INR 2.9* 3.0*     LDH:  Recent Labs   Lab 09/22/22  1225   *     Microbiology:  Microbiology Results (last 7 days)       ** No results found for the last 168 hours. **            I have reviewed all pertinent labs within the past 24 hours.    Diagnostic Results:  I have reviewed all  pertinent imaging results/findings within the past 24 hours.    Assessment/Plan:     Pseudoaneurysm of femoral artery  Vascular surgery consulted, appreciate recommendations  CTA shows 3 new pseudoaneurysms of R femoral artery  Continuing Doxy 100mg bid for now    Groin hematoma  Was on VA-ECMO at last hospitalization with successful decannulation  CTA reveals 3 possible new pseudoaneurysms, vascular surgery has been consulted for evaluation  INR 3 on admission, and Hgb stable. Holding warfarin at this time pending recommendations from vascular surgery  CTS consulted in the ED, recommends discussion with vascular surgery. No bleeding from site at this time.     VT (ventricular tachycardia)  Patient has [employer] SICD  Currently on amiodarone 400mg qd and mexiletine 250mg TID, will continue at home dose    LVAD (left ventricular assist device) present  Procedure: Device Interrogation Including analysis of device parameters  Current Settings: Ventricular Assist Device  Review of device function is stable/unstable stable    TXP LVAD INTERROGATIONS 9/24/2022 9/22/2022 9/1/2022 9/1/2022 9/1/2022 8/31/2022 8/31/2022   Type HeartMate3 - HeartMate3 HeartMate3 HeartMate3 HeartMate3 HeartMate3   Flow 4.5 - 5.8 5.6 5.5 5.6 5.4   Speed 6300 - 6300 6300 6300 6300 6300   PI 4.6 - 1.5 1.9 2.9 2.8 3.0   Power (Jackson) 5.2 - 5.4 5.3 5.4 5.3 5.2   LSL - - 5900 - - - -   Low Flow Alarm - - - - no no no   High Power Alarm - - - - - - -   Pulsatility - No Pulse - - Intermittent pulse - -         Chronic combined systolic and diastolic heart failure  Currently appears clinically euvolemic  Taking torsemide 20mg daily, continue at this dose  Maintain K>4.0, Mg>2.0        Nic Torres MD  Heart Transplant  [facility] - Cardiology Stepdown

## 2022-09-25 NOTE — PROGRESS NOTES
Pharmacist Renal Dose Adjustment Note    Tim Richards is a 56 y.o. male being treated with the medication cefepime    Patient Data:    Vital Signs (Most Recent):  Temp: 98.2 °F (36.8 °C) (09/25/22 1045)  Pulse: 76 (09/25/22 1400)  Resp: 16 (09/25/22 1400)  BP: (!) 80/0 (09/25/22 1400)  SpO2: 98 % (09/25/22 1400)   Vital Signs (72h Range):  Temp:  [97.4 °F (36.3 °C)-98.6 °F (37 °C)]   Pulse:  []   Resp:  [12-31]   BP: (74-99)/(0-73)   SpO2:  [92 %-100 %]      Recent Labs   Lab 09/22/22  1225 09/24/22  1803 09/25/22  0903   CREATININE 1.4 1.6* 1.5*     Serum creatinine: 1.5 mg/dL (H) 09/25/22 0903  Estimated creatinine clearance: 62.9 mL/min (A)    Medication: cefepime 2g iv q12h will be changed to 2g IV q8h     Jesica Leija, PharmD, BCPS, BCCP Cardiology Clinical Pharmacy Specialist  [id number]

## 2022-09-25 NOTE — PLAN OF CARE
Cardiac ICU Care Plan    POC reviewed with Tim Richards and family. Questions and concerns addressed. No acute events today. Pt progressing toward goals.  Change antibiotics.   Will continue to monitor. See below and flowsheets for full assessment and VS info.       Neuro:  [place] Coma Scale  Best Eye Response: 4-->(E4) spontaneous  Best Motor Response: 6-->(M6) obeys commands  Best Verbal Response: 5-->(V5) oriented  [place] Coma Scale Score: 15  Assessment Qualifiers: patient not sedated/intubated  Pupil PERRLA: yes    24 hr Temp:  [97.4 °F (36.3 °C)-98.6 °F (37 °C)]      CV:  Rhythm: normal sinus rhythm   DVT prophylaxis: VTE Required Core Measure: Pharmacological prophylaxis initiated/maintained                    Type: HeartMate3       Pulses  Right Radial Pulse: Doppler  Left Radial Pulse: Doppler  Right Dorsalis Pedis Pulse: Doppler  Left Dorsalis Pedis Pulse: Doppler  Right Posterior Tibial Pulse: Doppler  Left Posterior Tibial Pulse: Doppler    Resp:  O2 Device (Oxygen Therapy): room air       GI/:  GI prophylaxis: yes  Diet/Nutrition Received: 2 gram sodium, low saturated fat/low cholesterol  Last Bowel Movement: 09/25/22      Intake/Output Summary (Last 24 hours) at 9/25/2022 1628  Last data filed at 9/25/2022 1400  Gross per 24 hour   Intake 1147.75 ml   Output 1900 ml   Net -752.25 ml        Nutritional Supplement Intake: Quantity , Type:     Labs/Accuchecks:  Recent Labs   Lab 09/24/22  1803 09/24/22  1815 09/25/22  0514 09/25/22  1407   WBC 5.75  --  5.27 5.37  5.37   RBC 3.47*  --  2.85* 2.88*  2.88*   HGB 8.9*  --  7.2* 7.6*  7.6*   HCT 31.7* 28* 26.4* 26.2*  26.2*     --  282 290  290      Recent Labs   Lab 09/22/22  1225 09/24/22  1803 09/25/22  0903   INR 2.9* 3.0* 2.8*      Recent Labs     09/25/22  0903      K 4.7   CO2 24      BUN 11   CREATININE 1.5*   ALKPHOS 81   ALT 16   AST 31   BILITOT 0.4       Recent Labs   Lab 09/24/22  1803   TROPONINI 0.569*    No  results for input(s): PH, PCO2, PO2, HCO3, POCSATURATED, BE in the last 72 hours.    Electrolytes: No replacement orders  Accuchecks: none    Gtts/LDAs:      Lines/Drains/Airways       Airway  Duration                  Surgical Airway -- days         Surgical Airway 08/04/22 Shiley Cuffed 52 days              Line  Duration                  VAD 07/13/22 1300 Left ventricular assist device HeartMate 3 74 days              Peripheral Intravenous Line  Duration                  Peripheral IV - Single Lumen 09/24/22 1800 18 G Anterior;Left Forearm <1 day         Peripheral IV - Single Lumen 09/24/22 1801 22 G Posterior;Right Forearm <1 day         Peripheral IV - Single Lumen 09/25/22 0900 20 G Anterior;Left;Proximal Forearm <1 day                    Skin/Wounds     Wounds: No  Wound care consulted: No

## 2022-09-25 NOTE — SUBJECTIVE & OBJECTIVE
Past Medical History:   Diagnosis Date    A-fib     Anticoagulant long-term use     Atrial flutter 7/30/2022    CHF (congestive heart failure)     Class 1 obesity due to excess calories with serious comorbidity and body mass index (BMI) of 31.0 to 31.9 in adult     Class 1 obesity due to excess calories with serious comorbidity and body mass index (BMI) of 32.0 to 32.9 in adult     Dilated cardiomyopathy 1/10/2018    Disorder of kidney and ureter     CKD    Encounter for blood transfusion     Essential hypertension 8/28/2022    Gout     HTN (hypertension)     Hx of psychiatric care     ICD (implantable cardioverter-defibrillator) infection 7/1/2020    Psychiatric problem     Thyroid disease     Ventricular tachycardia (paroxysmal)        Past Surgical History:   Procedure Laterality Date    AORTIC VALVULOPLASTY N/A 7/13/2022    Procedure: REPAIR, AORTIC VALVE;  Surgeon: Yg Kaufman MD;  [facility];  Service: Cardiovascular;  Laterality: N/A;    APPLICATION OF WOUND VACUUM-ASSISTED CLOSURE DEVICE N/A 7/15/2022    Procedure: APPLICATION, WOUND VAC;  Surgeon: Yg Kaufman MD;  [facility];  Service: Cardiovascular;  Laterality: N/A;  50 x 5 cm     CARDIAC CATHETERIZATION  Dec. 2012    CARDIAC DEFIBRILLATOR PLACEMENT Left     CRRT-D    COLONOSCOPY N/A 3/6/2018    Procedure: COLONOSCOPY;  Surgeon: Alonso Bone MD;  [facility]  Service: Endoscopy;  Laterality: N/A;    COLONOSCOPY N/A 7/17/2019    Procedure: COLONOSCOPY;  Surgeon: Blane Valdez MD;  [facility]  Service: Endoscopy;  Laterality: N/A;    COLONOSCOPY N/A 7/18/2019    Procedure: COLONOSCOPY;  Surgeon: Blane Valdez MD;  [facility]  Service: Endoscopy;  Laterality: N/A;    ESOPHAGOGASTRODUODENOSCOPY N/A 7/17/2019    Procedure: EGD (ESOPHAGOGASTRODUODENOSCOPY);  Surgeon: Blane Valdez MD;  [facility]  Service: Endoscopy;  Laterality: N/A;     ESOPHAGOGASTRODUODENOSCOPY N/A 7/18/2019    Procedure: EGD (ESOPHAGOGASTRODUODENOSCOPY);  Surgeon: Blane Valdez MD;  Location: [facility] ENDO (2ND FLR);  Service: Endoscopy;  Laterality: N/A;    FOREIGN BODY REMOVAL N/A 7/22/2022    Procedure: REMOVAL, FOREIGN BODY;  Surgeon: Yg Kaufman MD;  Location: [facility] OR [facility] FLR;  Service: Cardiovascular;  Laterality: N/A;  LVAD Heartmate 2 drive line removal    INSERTION OF GRAFT TO PERICARDIUM N/A 7/15/2022    Procedure: INSERTION, GRAFT, PERICARDIUM;  Surgeon: Yg Kaufman MD;  Location: [facility] OR [facility] FLR;  Service: Cardiovascular;  Laterality: N/A;    IRRIGATION OF MEDIASTINUM N/A 7/15/2022    Procedure: IRRIGATION, MEDIASTINUM;  Surgeon: Yg Kaufman MD;  Location: [facility] OR [facility] FLR;  Service: Cardiovascular;  Laterality: N/A;    LYSIS OF ADHESIONS  7/13/2022    Procedure: LYSIS, ADHESIONS;  Surgeon: Yg Kuafman MD;  [facility];  Service: Cardiovascular;;    NONINVASIVE CARDIAC ELECTROPHYSIOLOGY STUDY N/A 10/18/2019    Procedure: CARDIAC ELECTROPHYSIOLOGY STUDY, NONINVASIVE;  Surgeon: Raz Wagner MD;  Location: [facility] EP LAB;  Service: Cardiology;  Laterality: N/A;  VT, DFTs, MDT CRTD in situ, LVAD, juarez, MB, 3098    REPLACEMENT OF IMPLANTABLE CARDIOVERTER-DEFIBRILLATOR (ICD) GENERATOR N/A 3/9/2020    Procedure: REPLACEMENT, ICD GENERATOR;  Surgeon: Harry Yun MD;  Location: [facility] EP LAB;  Service: Cardiology;  Laterality: N/A;  VT, ICD Gen Change and Lead Revision, MDT, MAC, DM,3 Prep    REPLACEMENT OF LEFT VENTRICULAR ASSIST DEVICE (LVAD)  7/13/2022    Procedure: REPLACEMENT, LVAD;  Surgeon: Yg Kaufman MD;  [facility];  Service: Cardiovascular;;    REPLACEMENT OF PUMP N/A 7/13/2022    Procedure: REPLACEMENT, PUMP;  Surgeon: Yg Kaufman MD;  [facility];  Service: Cardiovascular;  Laterality: N/A;  LVAD pump exchange  EXPLANATION OF HEATMATE 2  IMPLANTATION OF HEARTMATE 3  IMPLANTATION OF 8MM CHIMNEY GRAFT TO  RFA  INITIATION OF ECMO  TEMPORARY CLOSURE OF CHEST    REVISION OF IMPLANTABLE CARDIOVERTER-DEFIBRILLATOR (ICD) ELECTRODE LEAD PLACEMENT N/A 3/9/2020    Procedure: REVISION, INSERTION, ELECTRODE LEAD, ICD;  Surgeon: Harry Yun MD;  Location: [facility] EP LAB;  Service: Cardiology;  Laterality: N/A;  VT, ICD Gen Change and Lead Revision, MDT, MAC, DM,3 Prep    STERNAL WOUND CLOSURE N/A 7/14/2022    Procedure: CLOSURE, WOUND, STERNUM;  Surgeon: Yg Kaufman MD;  Location: [facility] OR [facility] FLR;  Service: Cardiovascular;  Laterality: N/A;  temporary closure  evacuation of hematoma    STERNAL WOUND CLOSURE N/A 7/15/2022    Procedure: CLOSURE, WOUND, STERNUM;  Surgeon: Yg Kaufman MD;  Location: [facility] OR [facility] FLR;  Service: Cardiovascular;  Laterality: N/A;    TRACHEOSTOMY N/A 8/4/2022    Procedure: CREATION, TRACHEOSTOMY;  Surgeon: Germain Holt MD;  [facility];  Service: General;  Laterality: N/A;    TREATMENT OF CARDIAC ARRHYTHMIA  10/18/2019    Procedure: Cardioversion or Defibrillation;  Surgeon: Raz Wagner MD;  Location: [facility] EP LAB;  Service: Cardiology;;       Review of patient's allergies indicates:   Allergen Reactions    Lisinopril Anaphylaxis    Hydralazine analogues      Chronic constipation, impotence, dizziness       No current facility-administered medications for this encounter.     Current Outpatient Medications   Medication Sig    ALPRAZolam (XANAX) 1 MG tablet TAKE 1 TABLET BY MOUTH TWICE A DAILY AS NEEDED FOR ANXIETY.    amiodarone (PACERONE) 200 MG Tab Take 2 tablets (400 mg total) by mouth once daily.    aspirin (ECOTRIN) 81 MG EC tablet Take 1 tablet (81 mg total) by mouth once daily.    doxycycline (VIBRAMYCIN) 100 MG Cap Take 1 capsule (100 mg total) by mouth 2 (two) times daily. for 10 days    levothyroxine (SYNTHROID) 50 MCG tablet Take 1 tablet (50 mcg total) by mouth before breakfast.    magnesium oxide (MAG-OX) 400 mg (241.3 mg magnesium) tablet Take 1 tablet (400  mg total) by mouth 2 (two) times daily.    mexiletine (MEXITIL) 250 MG Cap Take 1 capsule (250 mg total) by mouth every 8 (eight) hours.    pantoprazole (PROTONIX) 40 MG tablet Take 1 tablet (40 mg total) by mouth daily with lunch.    warfarin (COUMADIN) 5 MG tablet Take 1.5 tablets (7.5 mg total) by mouth Daily.    mirtazapine (REMERON) 30 MG tablet Take 1 tablet (30 mg total) by mouth every evening.    omega-3 acid ethyl esters (LOVAZA) 1 gram capsule Take 2 capsules (2 g total) by mouth 2 (two) times daily.    polyethylene glycol (GLYCOLAX) 17 gram PwPk Take 17 g by mouth once daily.    torsemide (DEMADEX) 20 MG Tab TAKE 2 TABLETS BY MOUTH ONCE DAILY (Patient taking differently: daily as needed.)    zolpidem (AMBIEN CR) 12.5 MG CR tablet TAKE 1 TABLET (12.5 MG TOTAL) BY MOUTH NIGHTLY AS NEEDED FOR INSOMNIA. (Patient not taking: Reported on 2022)     Family History       Problem Relation (Age of Onset)    Cancer Sister (54)    Coronary artery disease Father    Diabetes Father    Heart disease Father    Hypertension Father    No Known Problems Mother, Brother          Tobacco Use    Smoking status: Former     Packs/day: 1.00     Years: 31.00     Pack years: 31.00     Types: Cigarettes     Quit date: 2018     Years since quittin.7    Smokeless tobacco: Never    Tobacco comments:     pt is quiting on his own - pt stated not qualified for program;  pt  quit on his own   Substance and Sexual Activity    Alcohol use: No     Alcohol/week: 0.0 standard drinks     Comment: quit    Drug use: No    Sexual activity: Yes     Partners: Female     Birth control/protection: None     Comment: 10/5/17  with same partner 7 years      Review of Systems   Constitutional:  Positive for fatigue. Negative for activity change, appetite change and chills.   HENT:  Negative for congestion and dental problem.    Respiratory:  Negative for apnea, cough, choking, chest tightness and shortness of breath.     Cardiovascular:  Negative for chest pain and leg swelling.   Gastrointestinal:  Negative for abdominal distention, abdominal pain, anal bleeding and blood in stool.   Endocrine: Negative for cold intolerance, heat intolerance and polydipsia.   Genitourinary:  Negative for difficulty urinating, dysuria, enuresis and flank pain.   Musculoskeletal:  Negative for arthralgias, back pain, gait problem and joint swelling.   Skin:  Negative for color change, pallor and rash.   Neurological:  Negative for dizziness, facial asymmetry, light-headedness and headaches.   Hematological:  Negative for adenopathy. Does not bruise/bleed easily.   Objective:     Vital Signs (Most Recent):  Pulse: 75 (09/24/22 1813)  Resp: 20 (09/24/22 1758)  SpO2: 100 % (09/24/22 1813) Vital Signs (24h Range):  Pulse:  [70-75] 75  Resp:  [20] 20  SpO2:  [100 %] 100 %     Patient Vitals for the past 72 hrs (Last 3 readings):   Weight   09/24/22 1758 86.2 kg (190 lb)     Body mass index is 25.07 kg/m².    No intake or output data in the 24 hours ending 09/24/22 1900    Physical Exam  Constitutional:       General: He is not in acute distress.     Appearance: Normal appearance. He is normal weight. He is not ill-appearing or toxic-appearing.   HENT:      Head: Normocephalic and atraumatic.      Nose: Nose normal. No congestion.      Mouth/Throat:      Mouth: Mucous membranes are moist.      Pharynx: No oropharyngeal exudate.   Eyes:      General:         Right eye: No discharge.         Left eye: No discharge.      Extraocular Movements: Extraocular movements intact.      Pupils: Pupils are equal, round, and reactive to light.   Cardiovascular:      Rate and Rhythm: Normal rate and regular rhythm.      Comments: VAD hum noted     Granulation tissue at right groin prior ECMO cannula site. 4 cm sized hematoma noted   Pulmonary:      Effort: Pulmonary effort is normal. No respiratory distress.      Breath sounds: Normal breath sounds. No wheezing,  rhonchi or rales.   Abdominal:      General: Abdomen is flat. Bowel sounds are normal. There is no distension.      Palpations: Abdomen is soft.      Tenderness: There is no abdominal tenderness.   Musculoskeletal:         General: No swelling. Normal range of motion.      Cervical back: Normal range of motion. No rigidity.      Right lower leg: No edema.      Left lower leg: No edema.   Skin:     General: Skin is warm and dry.      Findings: No lesion or rash.   Neurological:      General: No focal deficit present.      Mental Status: He is alert and oriented to person, place, and time.      Cranial Nerves: No cranial nerve deficit.      Motor: No weakness.       Significant Labs:  CBC:  Recent Labs   Lab 09/22/22 1225 09/24/22  1803 09/24/22  1815   WBC 5.23 5.75  --    RBC 3.70* 3.47*  --    HGB 9.4* 8.9*  --    HCT 33.8* 31.7* 28*    335  --    MCV 91 91  --    MCH 25.4* 25.6*  --    MCHC 27.8* 28.1*  --      BNP:  Recent Labs   Lab 09/22/22  1225 09/24/22  1803   * 229*     CMP:  Recent Labs   Lab 09/22/22  1225 09/24/22  1803   GLU 67* 79   CALCIUM 9.2 8.9   ALBUMIN 2.9* 2.6*   PROT 7.2 6.5    139   K 4.1 4.3   CO2 28 22*    106   BUN 10 11   CREATININE 1.4 1.6*   ALKPHOS 110 94   ALT 19 20   AST 31 31   BILITOT 0.5 0.4      Coagulation:   Recent Labs   Lab 09/22/22  1225 09/24/22  1803   INR 2.9* 3.0*     LDH:  Recent Labs   Lab 09/22/22  1225   *     Microbiology:  Microbiology Results (last 7 days)       ** No results found for the last 168 hours. **            BMP:   Recent Labs   Lab 09/22/22  1225 09/24/22  1803   GLU 67* 79    139   K 4.1 4.3    106   CO2 28 22*   BUN 10 11   CREATININE 1.4 1.6*   CALCIUM 9.2 8.9   MG 2.2  --      I have reviewed all pertinent labs within the past 24 hours.    Diagnostic Results:  Reviewed

## 2022-09-25 NOTE — PROGRESS NOTES
[facility] - Cardiac Intensive Care  Vascular Surgery  Progress Note    Patient Name: Tim Richards  MRN: [id number]  Admission Date: 9/24/2022  Primary Care Provider: Deyanira Booth MD    Subjective:     Interval History: NAEON. Patient resting in bed this morning, no complaints of pain. Patient needs to remain on strict bedrest. Cultures of wound taken, blood cultures ordered will follow. INR of 3, hold coumadin and transition to heparin. Plan for RLE angiogram tomorrow in OR. Patient NPO, will obtain consent.     Post-Op Info:  * No surgery found *           Medications:  Continuous Infusions:  Scheduled Meds:   amiodarone  400 mg Oral Daily    doxycycline  100 mg Oral Q12H    levothyroxine  50 mcg Oral Before breakfast    magnesium oxide  400 mg Oral BID    mexiletine  250 mg Oral Q8H    mirtazapine  30 mg Oral QHS    pantoprazole  40 mg Oral Daily with lunch    polyethylene glycol  17 g Oral Daily    torsemide  20 mg Oral Daily     PRN Meds:sodium chloride, ALPRAZolam, hydrALAZINE, zolpidem     Objective:     Vital Signs (Most Recent):  Temp: 98.6 °F (37 °C) (09/25/22 0700)  Pulse: 83 (09/25/22 0800)  Resp: 19 (09/25/22 0800)  BP: (!) 80/0 (09/25/22 0827)  SpO2: (!) 94 % (09/25/22 0800)   Vital Signs (24h Range):  Temp:  [97.4 °F (36.3 °C)-98.6 °F (37 °C)] 98.6 °F (37 °C)  Pulse:  [30-83] 83  Resp:  [18-31] 19  SpO2:  [92 %-100 %] 94 %  BP: (74-99)/(0-73) 80/0         Physical Exam  Constitutional:       Appearance: Normal appearance.   HENT:      Head: Normocephalic and atraumatic.      Mouth/Throat:      Mouth: Mucous membranes are moist.   Eyes:      Pupils: Pupils are equal, round, and reactive to light.   Cardiovascular:      Rate and Rhythm: Regular rhythm. Bradycardia present.   Abdominal:      General: Abdomen is flat.      Palpations: Abdomen is soft.      Comments: Previous drive line site open healing with granulation tissue, no purulence noted   Musculoskeletal:      Comments: 5/5 strength all 4  extremities, palpable femoral, PT and DP pulses bilaterally. Right groin cutdown site open with granulation tissue and thrombus in wound bed, no active bleeding.    Neurological:      Mental Status: He is alert.       Significant Labs:  CBC:   Recent Labs   Lab 09/25/22  0514   WBC 5.27   RBC 2.85*   HGB 7.2*   HCT 26.4*      MCV 93   MCH 25.3*   MCHC 27.3*     CMP:   Recent Labs   Lab 09/24/22  1803   GLU 79   CALCIUM 8.9   ALBUMIN 2.6*   PROT 6.5      K 4.3   CO2 22*      BUN 11   CREATININE 1.6*   ALKPHOS 94   ALT 20   AST 31   BILITOT 0.4       Significant Diagnostics:  I have reviewed all pertinent imaging results/findings within the past 24 hours.    Assessment/Plan:     Pseudoaneurysm of femoral artery  Tim Richards is a 56 y.o. 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 Vascular surgery has been consulted for evaluation of right groin site concerning for pseudoaneurym.     Patient not currently bleeding, hemoglobin is stable, and INR supratherapeutic at 3. History of purulence from right groni wound also complicates decision making of what to do. Given patient's extensive comorbidities and high operative risk, we will plan to treat pseudoaneurysm with endovascular interventions    Recommend:  - Infectious Disease consultation with intention to treat infected wound and 6 covered endovascular stent  -Wound culture sent for anaerobic, aerobic, and fungal  -Broad-spectrum antibiotics  -Will plan for right lower extremity angiogram tomorrow  -Risks, benefits, and alternatives discussed with the patient, he agrees with plan and wished to proceed  -Obtain signed consent   -NPO at midnight  -Strict bedrest, head of bed less than 30°, keep right leg straight  -Blood cultures x2  -Hold coumadin,   -Allow INR to drift, currently supra  therapeutic  -Recheck INR in AM, if elevated, Tranfuse FFP for goal normal INR  - Start heparin ggt at level determined by heart failure service  - Plan for ileofemoral  bypass on 9/27, rifampin soaked dacron bypass, Cardiac anesthesia  - Plastics consultation for possible muscle flap coverage.  - Pack wound and dress with tegaderm, vascular surgery will change BID            Marlyn Mccabe MD  Vascular Surgery  [facility] - Cardiac [facility]ensive [facility]

## 2022-09-25 NOTE — NURSING TRANSFER
Nursing Transfer Note      9/25/2022     Reason patient is being transferred: higher level of monitoring    Transfer To: CICU     Transfer via bed    Transfer with cardiac monitoring    Transported by RN    Medicines sent: none    Any special needs or follow-up needed: Personal LVAD equipments    Chart send with patient: No (nothing  in the chart)    Notified: spouse

## 2022-09-25 NOTE — HPI
55 yo male with complex medical history including HM3 2018 with recent prolonged hospital stay for COVID with prior hospital course notable for AV repair, redo sternotomy, explant of prior LVAD and Va-ECMO (decannulated 7/19) with takeback for mediastinal washout/hematoma, sternal closure, creation of neopericardium, Kleb aerogenes VAP s/p treatment admitted for bleeding from prior ECMO cannula site. Pt reported that he was seen in HTS clinic on 9/22 and was noted to have purulent drainage. He was given a course of doxycycline. Admission notable for CTA with new dilatation of R fem artery in the region of prior soft tissue density in R groin from prior ECMO cannulation site. Surgical disposition pending. Blcx ngtd and wound cultures from R groin in process. Pt is currently on doxycycline.

## 2022-09-25 NOTE — SUBJECTIVE & OBJECTIVE
Past Medical History:   Diagnosis Date    A-fib     Anticoagulant long-term use     Atrial flutter 7/30/2022    CHF (congestive heart failure)     Class 1 obesity due to excess calories with serious comorbidity and body mass index (BMI) of 31.0 to 31.9 in adult     Class 1 obesity due to excess calories with serious comorbidity and body mass index (BMI) of 32.0 to 32.9 in adult     Dilated cardiomyopathy 1/10/2018    Disorder of kidney and ureter     CKD    Encounter for blood transfusion     Essential hypertension 8/28/2022    Gout     HTN (hypertension)     Hx of psychiatric care     ICD (implantable cardioverter-defibrillator) infection 7/1/2020    Psychiatric problem     Thyroid disease     Ventricular tachycardia (paroxysmal)        Past Surgical History:   Procedure Laterality Date    AORTIC VALVULOPLASTY N/A 7/13/2022    Procedure: REPAIR, AORTIC VALVE;  Surgeon: Yg Kaufman MD;  [facility];  Service: Cardiovascular;  Laterality: N/A;    APPLICATION OF WOUND VACUUM-ASSISTED CLOSURE DEVICE N/A 7/15/2022    Procedure: APPLICATION, WOUND VAC;  Surgeon: Yg Kaufman MD;  [facility];  Service: Cardiovascular;  Laterality: N/A;  50 x 5 cm     CARDIAC CATHETERIZATION  Dec. 2012    CARDIAC DEFIBRILLATOR PLACEMENT Left     CRRT-D    COLONOSCOPY N/A 3/6/2018    Procedure: COLONOSCOPY;  Surgeon: Alonso Bone MD;  [facility]  Service: Endoscopy;  Laterality: N/A;    COLONOSCOPY N/A 7/17/2019    Procedure: COLONOSCOPY;  Surgeon: Blane Valdez MD;  [facility]  Service: Endoscopy;  Laterality: N/A;    COLONOSCOPY N/A 7/18/2019    Procedure: COLONOSCOPY;  Surgeon: Blane Valdez MD;  [facility]  Service: Endoscopy;  Laterality: N/A;    ESOPHAGOGASTRODUODENOSCOPY N/A 7/17/2019    Procedure: EGD (ESOPHAGOGASTRODUODENOSCOPY);  Surgeon: Blane Valdez MD;  [facility]  Service: Endoscopy;  Laterality: N/A;     ESOPHAGOGASTRODUODENOSCOPY N/A 7/18/2019    Procedure: EGD (ESOPHAGOGASTRODUODENOSCOPY);  Surgeon: Blane Valdez MD;  Location: [facility] ENDO (2ND FLR);  Service: Endoscopy;  Laterality: N/A;    FOREIGN BODY REMOVAL N/A 7/22/2022    Procedure: REMOVAL, FOREIGN BODY;  Surgeon: Yg Kaufman MD;  Location: [facility] OR [facility] FLR;  Service: Cardiovascular;  Laterality: N/A;  LVAD Heartmate 2 drive line removal    INSERTION OF GRAFT TO PERICARDIUM N/A 7/15/2022    Procedure: INSERTION, GRAFT, PERICARDIUM;  Surgeon: Yg Kaufman MD;  Location: [facility] OR [facility] FLR;  Service: Cardiovascular;  Laterality: N/A;    IRRIGATION OF MEDIASTINUM N/A 7/15/2022    Procedure: IRRIGATION, MEDIASTINUM;  Surgeon: Yg Kaumfan MD;  Location: [facility] OR [facility] FLR;  Service: Cardiovascular;  Laterality: N/A;    LYSIS OF ADHESIONS  7/13/2022    Procedure: LYSIS, ADHESIONS;  Surgeon: Yg Kaufman MD;  [facility];  Service: Cardiovascular;;    NONINVASIVE CARDIAC ELECTROPHYSIOLOGY STUDY N/A 10/18/2019    Procedure: CARDIAC ELECTROPHYSIOLOGY STUDY, NONINVASIVE;  Surgeon: Raz Wagner MD;  Location: [facility] EP LAB;  Service: Cardiology;  Laterality: N/A;  VT, DFTs, MDT CRTD in situ, LVAD, juarez, MB, 3098    REPLACEMENT OF IMPLANTABLE CARDIOVERTER-DEFIBRILLATOR (ICD) GENERATOR N/A 3/9/2020    Procedure: REPLACEMENT, ICD GENERATOR;  Surgeon: Harry Yun MD;  Location: [facility] EP LAB;  Service: Cardiology;  Laterality: N/A;  VT, ICD Gen Change and Lead Revision, MDT, MAC, DM,3 Prep    REPLACEMENT OF LEFT VENTRICULAR ASSIST DEVICE (LVAD)  7/13/2022    Procedure: REPLACEMENT, LVAD;  Surgeon: Yg Kaufman MD;  [facility];  Service: Cardiovascular;;    REPLACEMENT OF PUMP N/A 7/13/2022    Procedure: REPLACEMENT, PUMP;  Surgeon: Yg Kaufman MD;  [facility];  Service: Cardiovascular;  Laterality: N/A;  LVAD pump exchange  EXPLANATION OF HEATMATE 2  IMPLANTATION OF HEARTMATE 3  IMPLANTATION OF 8MM CHIMNEY GRAFT TO  RFA  INITIATION OF ECMO  TEMPORARY CLOSURE OF CHEST    REVISION OF IMPLANTABLE CARDIOVERTER-DEFIBRILLATOR (ICD) ELECTRODE LEAD PLACEMENT N/A 3/9/2020    Procedure: REVISION, INSERTION, ELECTRODE LEAD, ICD;  Surgeon: Harry Yun MD;  Location: [facility] EP LAB;  Service: Cardiology;  Laterality: N/A;  VT, ICD Gen Change and Lead Revision, MDT, MAC, DM,3 Prep    STERNAL WOUND CLOSURE N/A 7/14/2022    Procedure: CLOSURE, WOUND, STERNUM;  Surgeon: Yg Kaufman MD;  Location: [facility] OR [facility] FLR;  Service: Cardiovascular;  Laterality: N/A;  temporary closure  evacuation of hematoma    STERNAL WOUND CLOSURE N/A 7/15/2022    Procedure: CLOSURE, WOUND, STERNUM;  Surgeon: Yg Kaufman MD;  [facility];  Service: Cardiovascular;  Laterality: N/A;    TRACHEOSTOMY N/A 8/4/2022    Procedure: CREATION, TRACHEOSTOMY;  Surgeon: Germain Holt MD;  [facility];  Service: General;  Laterality: N/A;    TREATMENT OF CARDIAC ARRHYTHMIA  10/18/2019    Procedure: Cardioversion or Defibrillation;  Surgeon: Raz Wagner MD;  Location: [facility] EP LAB;  Service: Cardiology;;       Review of patient's allergies indicates:   Allergen Reactions    Lisinopril Anaphylaxis    Hydralazine analogues      Chronic constipation, impotence, dizziness       Current Facility-Administered Medications   Medication    ALPRAZolam tablet 1 mg    [START ON 9/25/2022] amiodarone tablet 400 mg    doxycycline tablet 100 mg    [START ON 9/25/2022] levothyroxine tablet 50 mcg    magnesium oxide tablet 400 mg    mexiletine capsule 250 mg    mirtazapine tablet 30 mg    [START ON 9/25/2022] pantoprazole EC tablet 40 mg    [START ON 9/25/2022] polyethylene glycol packet 17 g    zolpidem tablet 5 mg     Current Outpatient Medications   Medication Sig    ALPRAZolam (XANAX) 1 MG tablet TAKE 1 TABLET BY MOUTH TWICE A DAILY AS NEEDED FOR ANXIETY.    amiodarone (PACERONE) 200 MG Tab Take 2 tablets (400 mg total) by mouth once daily.     aspirin (ECOTRIN) 81 MG EC tablet Take 1 tablet (81 mg total) by mouth once daily.    doxycycline (VIBRAMYCIN) 100 MG Cap Take 1 capsule (100 mg total) by mouth 2 (two) times daily. for 10 days    levothyroxine (SYNTHROID) 50 MCG tablet Take 1 tablet (50 mcg total) by mouth before breakfast.    magnesium oxide (MAG-OX) 400 mg (241.3 mg magnesium) tablet Take 1 tablet (400 mg total) by mouth 2 (two) times daily.    mexiletine (MEXITIL) 250 MG Cap Take 1 capsule (250 mg total) by mouth every 8 (eight) hours.    pantoprazole (PROTONIX) 40 MG tablet Take 1 tablet (40 mg total) by mouth daily with lunch.    warfarin (COUMADIN) 5 MG tablet Take 1.5 tablets (7.5 mg total) by mouth Daily.    mirtazapine (REMERON) 30 MG tablet Take 1 tablet (30 mg total) by mouth every evening.    omega-3 acid ethyl esters (LOVAZA) 1 gram capsule Take 2 capsules (2 g total) by mouth 2 (two) times daily.    polyethylene glycol (GLYCOLAX) 17 gram PwPk Take 17 g by mouth once daily.    torsemide (DEMADEX) 20 MG Tab TAKE 2 TABLETS BY MOUTH ONCE DAILY (Patient taking differently: daily as needed.)    zolpidem (AMBIEN CR) 12.5 MG CR tablet TAKE 1 TABLET (12.5 MG TOTAL) BY MOUTH NIGHTLY AS NEEDED FOR INSOMNIA. (Patient not taking: Reported on 2022)     Family History       Problem Relation (Age of Onset)    Cancer Sister (54)    Coronary artery disease Father    Diabetes Father    Heart disease Father    Hypertension Father    No Known Problems Mother, Brother          Tobacco Use    Smoking status: Former     Packs/day: 1.00     Years: 31.00     Pack years: 31.00     Types: Cigarettes     Quit date: 2018     Years since quittin.7    Smokeless tobacco: Never    Tobacco comments:     pt is quiting on his own - pt stated not qualified for program;  pt  quit on his own   Substance and Sexual Activity    Alcohol use: No     Alcohol/week: 0.0 standard drinks     Comment: quit    Drug use: No    Sexual activity: Yes     Partners: Female      Birth control/protection: None     Comment: 10/5/17  with same partner 7 years      Review of Systems   Constitutional:  Positive for fatigue. Negative for activity change, fever and unexpected weight change.   Respiratory:  Negative for cough, chest tightness and shortness of breath.    Cardiovascular:  Negative for chest pain, palpitations and leg swelling.   Gastrointestinal:  Negative for abdominal distention, abdominal pain, constipation, nausea and vomiting.   Genitourinary:  Negative for decreased urine volume and difficulty urinating.   Skin:  Positive for wound.        Groin site with purulence and tenderness x 3 days   Objective:     Vital Signs (Most Recent):  Temp: 98.2 °F (36.8 °C) (09/24/22 1944)  Pulse: 74 (09/24/22 1944)  Resp: 18 (09/24/22 1944)  BP: (!) 74/0 (09/24/22 1944)  SpO2: 98 % (09/24/22 1944)   Vital Signs (24h Range):  Temp:  [98.2 °F (36.8 °C)] 98.2 °F (36.8 °C)  Pulse:  [70-75] 74  Resp:  [18-20] 18  SpO2:  [98 %-100 %] 98 %  BP: (74)/(0) 74/0     Patient Vitals for the past 72 hrs (Last 3 readings):   Weight   09/24/22 1758 86.2 kg (190 lb)     Body mass index is 25.07 kg/m².    No intake or output data in the 24 hours ending 09/24/22 2111    Physical Exam  Constitutional:       Appearance: Normal appearance.   Eyes:      Extraocular Movements: Extraocular movements intact.      Conjunctiva/sclera: Conjunctivae normal.   Cardiovascular:      Rate and Rhythm: Normal rate.      Pulses: Normal pulses.      Comments: VAD hum can be heard  Pulmonary:      Effort: Pulmonary effort is normal.      Breath sounds: Normal breath sounds.   Abdominal:      General: Bowel sounds are normal.      Comments: R abdominal old DLES wound with healthy granulation tissue, L sided DLES site clean dry and intact   Genitourinary:     Comments: R groin covered in dressing, no active bleeding from site, tender to palpation  Musculoskeletal:      Cervical back: Normal range of motion and neck supple.    Skin:     Capillary Refill: Capillary refill takes 2 to 3 seconds.   Neurological:      General: No focal deficit present.      Mental Status: He is alert.   Psychiatric:         Mood and Affect: Mood normal.         Behavior: Behavior normal.       Significant Labs:  CBC:  Recent Labs   Lab 09/22/22  1225 09/24/22  1803 09/24/22  1815   WBC 5.23 5.75  --    RBC 3.70* 3.47*  --    HGB 9.4* 8.9*  --    HCT 33.8* 31.7* 28*    335  --    MCV 91 91  --    MCH 25.4* 25.6*  --    MCHC 27.8* 28.1*  --      BNP:  Recent Labs   Lab 09/22/22  1225 09/24/22  1803   * 229*     CMP:  Recent Labs   Lab 09/22/22  1225 09/24/22  1803   GLU 67* 79   CALCIUM 9.2 8.9   ALBUMIN 2.9* 2.6*   PROT 7.2 6.5    139   K 4.1 4.3   CO2 28 22*    106   BUN 10 11   CREATININE 1.4 1.6*   ALKPHOS 110 94   ALT 19 20   AST 31 31   BILITOT 0.5 0.4      Coagulation:   Recent Labs   Lab 09/22/22  1225 09/24/22  1803   INR 2.9* 3.0*     LDH:  Recent Labs   Lab 09/22/22  1225   *     Microbiology:  Microbiology Results (last 7 days)       ** No results found for the last 168 hours. **            I have reviewed all pertinent labs within the past 24 hours.    Diagnostic Results:  I have reviewed all pertinent imaging results/findings within the past 24 hours.

## 2022-09-25 NOTE — ASSESSMENT & PLAN NOTE
Tim Richards is a 56 y.o. 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 Vascular surgery has been consulted for evaluation of right groin site concerning for pseudoaneurym.     Patient not currently bleeding, hemoglobin is stable, and INR supratherapeutic at 3. History of purulence from right groni wound also complicates decision making of what to do. Discussed patient with Attending Vascular Surgery Dr. Hernández, our recommendations are:  -Transfer to ICU for closer monitoring  -Hold coumadin, repeat labs in the morning  -Blood cultures x2  -Continue antibiotics  -Pack wound and dress with tegaderm, vascular surgery will change BID  -Optimize patient for surgery  -Will need to weigh risks and benefits of endovascular repair in setting of possibly infected groin vs open repair in setting of extensive comorbidities and anticoagulation. We will discuss further with patient, currently no indication for emergent intervention that would preclude the above.

## 2022-09-25 NOTE — SUBJECTIVE & OBJECTIVE
Medications:  Continuous Infusions:  Scheduled Meds:   amiodarone  400 mg Oral Daily    doxycycline  100 mg Oral Q12H    levothyroxine  50 mcg Oral Before breakfast    magnesium oxide  400 mg Oral BID    mexiletine  250 mg Oral Q8H    mirtazapine  30 mg Oral QHS    pantoprazole  40 mg Oral Daily with lunch    polyethylene glycol  17 g Oral Daily    torsemide  20 mg Oral Daily     PRN Meds:sodium chloride, ALPRAZolam, hydrALAZINE, zolpidem     Objective:     Vital Signs (Most Recent):  Temp: 98.6 °F (37 °C) (09/25/22 0700)  Pulse: 83 (09/25/22 0800)  Resp: 19 (09/25/22 0800)  BP: (!) 80/0 (09/25/22 0827)  SpO2: (!) 94 % (09/25/22 0800)   Vital Signs (24h Range):  Temp:  [97.4 °F (36.3 °C)-98.6 °F (37 °C)] 98.6 °F (37 °C)  Pulse:  [30-83] 83  Resp:  [18-31] 19  SpO2:  [92 %-100 %] 94 %  BP: (74-99)/(0-73) 80/0         Physical Exam  Constitutional:       Appearance: Normal appearance.   HENT:      Head: Normocephalic and atraumatic.      Mouth/Throat:      Mouth: Mucous membranes are moist.   Eyes:      Pupils: Pupils are equal, round, and reactive to light.   Cardiovascular:      Rate and Rhythm: Regular rhythm. Bradycardia present.   Abdominal:      General: Abdomen is flat.      Palpations: Abdomen is soft.      Comments: Previous drive line site open healing with granulation tissue, no purulence noted   Musculoskeletal:      Comments: 5/5 strength all 4 extremities, palpable femoral, PT and DP pulses bilaterally. Right groin cutdown site open with granulation tissue and thrombus in wound bed, no active bleeding.    Neurological:      Mental Status: He is alert.       Significant Labs:  CBC:   Recent Labs   Lab 09/25/22  0514   WBC 5.27   RBC 2.85*   HGB 7.2*   HCT 26.4*      MCV 93   MCH 25.3*   MCHC 27.3*     CMP:   Recent Labs   Lab 09/24/22  1803   GLU 79   CALCIUM 8.9   ALBUMIN 2.6*   PROT 6.5      K 4.3   CO2 22*      BUN 11   CREATININE 1.6*   ALKPHOS 94   ALT 20   AST 31   BILITOT 0.4        Significant Diagnostics:  I have reviewed all pertinent imaging results/findings within the past 24 hours.

## 2022-09-25 NOTE — CONSULTS
[facility] - Cardiology Stepdown  Vascular Surgery  Consult Note    Consults  Subjective:     Chief Complaint/Reason for Admission: right groin bleeding/pseudoaneurysm    History of Present Illness: Tim Richards is a 56 y.o. man with history of combined systolic and diastolic heart failure with LVAD exchange 2/2 thrombosis complicated by cardioplegic shock requiring VA ECMO. Vascular surgery has been consulted for evaluation of right groin site concerning for pseudoaneurym. The patient states he in his usual state of health over the last 3 weeks that he has been out of the hospital when this morning he realized his right groin wound was bleeding. The EMT that arrived to assist held pressure for 20 minutes. This wound was the site of the cardiopulmonary bypass access and subsequently the site for his VA ECMO.     Per report, the patient had 3 days of purulence which was seen on exam at his office visit 9/22. He was started on doxycycline.     Patient denies any fevers, chills, sweats, fatigue. He has been active since discharge from rehab 9/1, denies lower extremity weakness or numbness.      No new subjective & objective note has been filed under this hospital service since the last note was generated.  Assessment/Plan:     Pseudoaneurysm of femoral artery  Tim Richards is a 56 y.o. 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 Vascular surgery has been consulted for evaluation of right groin site concerning for pseudoaneurym.     Patient not currently bleeding, hemoglobin is stable, and INR supratherapeutic at 3. History of purulence from right groni wound also complicates decision making of what to do. Discussed patient with Attending Vascular Surgery Dr. Hernández, our recommendations are:  -Transfer to ICU for closer monitoring  -Hold coumadin, repeat  labs in the morning.  If open procedure, will need to transition to heparin gtt and hold during operation  -Bed rest, bed flat, leg straight  -Blood cultures x2  -Continue antibiotics  -Pack wound and dress with tegaderm, vascular surgery will change BID  -Optimize patient for surgery  -Will need to weigh risks and benefits of endovascular repair in setting of possibly infected groin vs open repair in setting of extensive comorbidities and anticoagulation. We will discuss further with patient, currently no indication for emergent intervention that would preclude the above.       Thank you for your consult. I will follow-up with patient. Please contact us if you have any additional questions.    MAN FLOWERS MD  Vascular Surgery  [facility] - Cardiology Stepdown

## 2022-09-25 NOTE — NURSING
Patient arrived to the unit. Telemetry box applied and vital signs documented in flow sheet. No alarms noted. VVS. Oriented to room, call bell with in reach, bed is in low lock position. Admit completed, plan of care initiated. Will continue to monitor.

## 2022-09-25 NOTE — ASSESSMENT & PLAN NOTE
INR 3, and Hgb stable. Pending CTA. CTS consulted in the ED. No bleeding from site at this time.   -Will plan for admission to [facility] to monitor hematoma and Hgb.   -Will continue warfarin if not active bleed on CTA.

## 2022-09-25 NOTE — ASSESSMENT & PLAN NOTE
Tim Richards is a 56 y.o. 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 Vascular surgery has been consulted for evaluation of right groin site concerning for pseudoaneurym.     Patient not currently bleeding, hemoglobin is stable, and INR supratherapeutic at 3. History of purulence from right groni wound also complicates decision making of what to do. Given patient's extensive comorbidities and high operative risk, we will plan to treat pseudoaneurysm with endovascular interventions    Recommend:  - Infectious Disease consultation with intention to treat infected wound and 6 covered endovascular stent  -Wound culture sent for anaerobic, aerobic, and fungal  -Broad-spectrum antibiotics  -Will plan for right lower extremity angiogram tomorrow  -Risks, benefits, and alternatives discussed with the patient, he agrees with plan and wished to proceed  -Obtain signed consent   -NPO at midnight  -Strict bedrest, head of bed less than 30°, keep right leg straight  -Blood cultures x2  -Hold coumadin, will need to transition to heparin gtt and hold during operation,   -Anticoagulation per primary team  - Pack wound and dress with tegaderm, vascular surgery will change BID

## 2022-09-25 NOTE — SUBJECTIVE & OBJECTIVE
**Interval History: No complaints. Hgb trending down at 7.2 - giving 1 unit PC's. Decision regarding plan to repair PSA's of right femoral artery are pending. Coumadin is on hold, INR down a bit at 2.8.     Continuous Infusions:  Scheduled Meds:   amiodarone  400 mg Oral Daily    doxycycline  100 mg Oral Q12H    levothyroxine  50 mcg Oral Before breakfast    magnesium oxide  400 mg Oral BID    mexiletine  250 mg Oral Q8H    mirtazapine  30 mg Oral QHS    pantoprazole  40 mg Oral Daily with lunch    polyethylene glycol  17 g Oral Daily    torsemide  20 mg Oral Daily     PRN Meds:sodium chloride, ALPRAZolam, hydrALAZINE, zolpidem    Review of patient's allergies indicates:   Allergen Reactions    Lisinopril Anaphylaxis    Hydralazine analogues      Chronic constipation, impotence, dizziness     Objective:     Vital Signs (Most Recent):  Temp: 98.3 °F (36.8 °C) (09/25/22 0945)  Pulse: 76 (09/25/22 0945)  Resp: 15 (09/25/22 0945)  BP: (!) 84/0 (09/25/22 0945)  SpO2: 95 % (09/25/22 0945)   Vital Signs (24h Range):  Temp:  [97.4 °F (36.3 °C)-98.6 °F (37 °C)] 98.3 °F (36.8 °C)  Pulse:  [30-83] 76  Resp:  [15-31] 15  SpO2:  [92 %-100 %] 95 %  BP: (74-99)/(0-73) 84/0     Patient Vitals for the past 72 hrs (Last 3 readings):   Weight   09/25/22 0105 90.2 kg (198 lb 13.7 oz)   09/24/22 2136 90.2 kg (198 lb 13.7 oz)   09/24/22 1758 86.2 kg (190 lb)     Body mass index is 26.24 kg/m².      Intake/Output Summary (Last 24 hours) at 9/25/2022 1021  Last data filed at 9/25/2022 0600  Gross per 24 hour   Intake 290 ml   Output 300 ml   Net -10 ml       Hemodynamic Parameters:       Telemetry: SR    Physical Exam  Constitutional:       Appearance: Normal appearance.   HENT:      Head: Normocephalic and atraumatic.   Eyes:      Extraocular Movements: Extraocular movements intact.      Pupils: Pupils are equal, round, and reactive to light.   Neck:      Comments: Neck veins are not elevated  Cardiovascular:      Rate and Rhythm: Normal  rate and regular rhythm.      Comments: Smooth VAD hum  Pulmonary:      Effort: Pulmonary effort is normal.      Breath sounds: Normal breath sounds.   Abdominal:      General: Bowel sounds are normal.      Palpations: Abdomen is soft.   Musculoskeletal:         General: No swelling. Normal range of motion.      Cervical back: Normal range of motion and neck supple.   Skin:     General: Skin is warm and dry.      Capillary Refill: Capillary refill takes 2 to 3 seconds.   Neurological:      General: No focal deficit present.      Mental Status: He is alert and oriented to person, place, and time.   Psychiatric:         Mood and Affect: Mood normal.         Behavior: Behavior normal.         Thought Content: Thought content normal.         Judgment: Judgment normal.       Significant Labs:  CBC:  Recent Labs   Lab 09/22/22 1225 09/24/22 1803 09/24/22 1815 09/25/22  0514   WBC 5.23 5.75  --  5.27   RBC 3.70* 3.47*  --  2.85*   HGB 9.4* 8.9*  --  7.2*   HCT 33.8* 31.7* 28* 26.4*    335  --  282   MCV 91 91  --  93   MCH 25.4* 25.6*  --  25.3*   MCHC 27.8* 28.1*  --  27.3*     BNP:  Recent Labs   Lab 09/22/22 1225 09/24/22  1803   * 229*     CMP:  Recent Labs   Lab 09/22/22 1225 09/24/22  1803 09/25/22  0903   GLU 67* 79 74   CALCIUM 9.2 8.9 8.4*   ALBUMIN 2.9* 2.6* 2.3*   PROT 7.2 6.5 5.8*    139 137   K 4.1 4.3 4.7   CO2 28 22* 24    106 106   BUN 10 11 11   CREATININE 1.4 1.6* 1.5*   ALKPHOS 110 94 81   ALT 19 20 16   AST 31 31 31   BILITOT 0.5 0.4 0.4      Coagulation:   Recent Labs   Lab 09/22/22 1225 09/24/22  1803 09/25/22  0903   INR 2.9* 3.0* 2.8*     LDH:  Recent Labs   Lab 09/22/22 1225   *     Microbiology:  Microbiology Results (last 7 days)       Procedure Component Value Units Date/Time    Fungus culture [[id number]] Collected: 09/25/22 0815    Order Status: Sent Specimen: Wound from Groin Updated: 09/25/22 0918    Culture, Anaerobe [[id number]] Collected: 09/25/22  0815    Order Status: Sent Specimen: Wound from Groin Updated: 09/25/22 0917    Aerobic culture [[id number]] Collected: 09/25/22 0815    Order Status: Sent Specimen: Wound from Groin Updated: 09/25/22 0917    Blood culture [[id number]] Collected: 09/25/22 0133    Order Status: Completed Specimen: Blood from Peripheral, Lower Arm, Left Updated: 09/25/22 0915     Blood Culture, Routine No Growth to date    Narrative:      Collection has been rescheduled by TR3 at 09/25/2022 00:31 Reason: Pt   dont wanna get stuck rn sharona  Collection has been rescheduled by TR3 at 09/25/2022 00:31 Reason: Pt   dont wanna get stuck rn bernardalle    Blood culture [[id number]] Collected: 09/25/22 0138    Order Status: Completed Specimen: Blood from Peripheral, Hand, Right Updated: 09/25/22 0915     Blood Culture, Routine No Growth to date    Narrative:      Collection has been rescheduled by TR3 at 09/25/2022 00:31 Reason: Pt   dont wanna get stuck rn sharona  Collection has been rescheduled by TR3 at 09/25/2022 00:31 Reason: Pt   dont wanna get stuck rn bernardalle    Blood culture [[id number]]     Order Status: Canceled Specimen: Blood     Blood culture [[id number]]     Order Status: Canceled Specimen: Blood             I have reviewed all pertinent labs within the past 24 hours.    Estimated Creatinine Clearance: 62.9 mL/min (A) (based on SCr of 1.5 mg/dL (H)).    Diagnostic Results:  I have reviewed and interpreted all pertinent imaging results/findings within the past 24 hours.

## 2022-09-26 ENCOUNTER — ANESTHESIA EVENT (OUTPATIENT)
Dept: SURGERY | Facility: HOSPITAL | Age: 57
DRG: 270 | End: 2022-09-26
Payer: COMMERCIAL

## 2022-09-26 ENCOUNTER — ANTI-COAG VISIT (OUTPATIENT)
Dept: CARDIOLOGY | Facility: CLINIC | Age: 57
End: 2022-09-26
Payer: COMMERCIAL

## 2022-09-26 LAB
ALBUMIN SERPL BCP-MCNC: 2.2 G/DL (ref 3.5–5.2)
ALP SERPL-CCNC: 81 U/L (ref 55–135)
ALT SERPL W/O P-5'-P-CCNC: 16 U/L (ref 10–44)
ANION GAP SERPL CALC-SCNC: 8 MMOL/L (ref 8–16)
AST SERPL-CCNC: 28 U/L (ref 10–40)
BASOPHILS # BLD AUTO: 0.03 K/UL (ref 0–0.2)
BASOPHILS # BLD AUTO: 0.04 K/UL (ref 0–0.2)
BASOPHILS NFR BLD: 0.4 % (ref 0–1.9)
BASOPHILS NFR BLD: 0.6 % (ref 0–1.9)
BILIRUB DIRECT SERPL-MCNC: 0.3 MG/DL (ref 0.1–0.3)
BILIRUB SERPL-MCNC: 0.5 MG/DL (ref 0.1–1)
BLD PROD TYP BPU: NORMAL
BLOOD UNIT EXPIRATION DATE: NORMAL
BLOOD UNIT TYPE CODE: 5100
BLOOD UNIT TYPE: NORMAL
BNP SERPL-MCNC: 97 PG/ML (ref 0–99)
BUN SERPL-MCNC: 12 MG/DL (ref 6–20)
CALCIUM SERPL-MCNC: 8.3 MG/DL (ref 8.7–10.5)
CHLORIDE SERPL-SCNC: 105 MMOL/L (ref 95–110)
CK SERPL-CCNC: 57 U/L (ref 20–200)
CO2 SERPL-SCNC: 24 MMOL/L (ref 23–29)
CODING SYSTEM: NORMAL
CREAT SERPL-MCNC: 1.6 MG/DL (ref 0.5–1.4)
DIFFERENTIAL METHOD: ABNORMAL
DIFFERENTIAL METHOD: ABNORMAL
DISPENSE STATUS: NORMAL
EOSINOPHIL # BLD AUTO: 0.3 K/UL (ref 0–0.5)
EOSINOPHIL # BLD AUTO: 0.3 K/UL (ref 0–0.5)
EOSINOPHIL NFR BLD: 4.4 % (ref 0–8)
EOSINOPHIL NFR BLD: 4.6 % (ref 0–8)
ERYTHROCYTE [DISTWIDTH] IN BLOOD BY AUTOMATED COUNT: 15 % (ref 11.5–14.5)
ERYTHROCYTE [DISTWIDTH] IN BLOOD BY AUTOMATED COUNT: 15.5 % (ref 11.5–14.5)
EST. GFR  (NO RACE VARIABLE): 50.6 ML/MIN/1.73 M^2
GLUCOSE SERPL-MCNC: 123 MG/DL (ref 70–110)
HCT VFR BLD AUTO: 29.7 % (ref 40–54)
HCT VFR BLD AUTO: 31.6 % (ref 40–54)
HGB BLD-MCNC: 9 G/DL (ref 14–18)
HGB BLD-MCNC: 9.7 G/DL (ref 14–18)
IMM GRANULOCYTES # BLD AUTO: 0.03 K/UL (ref 0–0.04)
IMM GRANULOCYTES # BLD AUTO: 0.03 K/UL (ref 0–0.04)
IMM GRANULOCYTES NFR BLD AUTO: 0.4 % (ref 0–0.5)
IMM GRANULOCYTES NFR BLD AUTO: 0.4 % (ref 0–0.5)
INR PPP: 1.5 (ref 0.8–1.2)
INR PPP: 2.3 (ref 0.8–1.2)
LDH SERPL L TO P-CCNC: 290 U/L (ref 110–260)
LYMPHOCYTES # BLD AUTO: 1.2 K/UL (ref 1–4.8)
LYMPHOCYTES # BLD AUTO: 1.4 K/UL (ref 1–4.8)
LYMPHOCYTES NFR BLD: 18.1 % (ref 18–48)
LYMPHOCYTES NFR BLD: 19.9 % (ref 18–48)
MAGNESIUM SERPL-MCNC: 1.9 MG/DL (ref 1.6–2.6)
MCH RBC QN AUTO: 27.1 PG (ref 27–31)
MCH RBC QN AUTO: 27.6 PG (ref 27–31)
MCHC RBC AUTO-ENTMCNC: 30.3 G/DL (ref 32–36)
MCHC RBC AUTO-ENTMCNC: 30.7 G/DL (ref 32–36)
MCV RBC AUTO: 90 FL (ref 82–98)
MCV RBC AUTO: 90 FL (ref 82–98)
MONOCYTES # BLD AUTO: 1 K/UL (ref 0.3–1)
MONOCYTES # BLD AUTO: 1 K/UL (ref 0.3–1)
MONOCYTES NFR BLD: 14 % (ref 4–15)
MONOCYTES NFR BLD: 14.1 % (ref 4–15)
NEUTROPHILS # BLD AUTO: 4.2 K/UL (ref 1.8–7.7)
NEUTROPHILS # BLD AUTO: 4.3 K/UL (ref 1.8–7.7)
NEUTROPHILS NFR BLD: 60.7 % (ref 38–73)
NEUTROPHILS NFR BLD: 62.4 % (ref 38–73)
NRBC BLD-RTO: 0 /100 WBC
NRBC BLD-RTO: 0 /100 WBC
NUM UNITS TRANS FFP: NORMAL
NUM UNITS TRANS PACKED RBC: NORMAL
NUM UNITS TRANS PACKED RBC: NORMAL
PLATELET # BLD AUTO: 252 K/UL (ref 150–450)
PLATELET # BLD AUTO: 270 K/UL (ref 150–450)
PMV BLD AUTO: 9.2 FL (ref 9.2–12.9)
PMV BLD AUTO: 9.7 FL (ref 9.2–12.9)
POTASSIUM SERPL-SCNC: 3.8 MMOL/L (ref 3.5–5.1)
PROT SERPL-MCNC: 5.7 G/DL (ref 6–8.4)
PROTHROMBIN TIME: 15.8 SEC (ref 9–12.5)
PROTHROMBIN TIME: 23.3 SEC (ref 9–12.5)
RBC # BLD AUTO: 3.32 M/UL (ref 4.6–6.2)
RBC # BLD AUTO: 3.52 M/UL (ref 4.6–6.2)
SODIUM SERPL-SCNC: 137 MMOL/L (ref 136–145)
TRANS ERYTHROCYTES VOL PATIENT: NORMAL ML
WBC # BLD AUTO: 6.81 K/UL (ref 3.9–12.7)
WBC # BLD AUTO: 6.98 K/UL (ref 3.9–12.7)

## 2022-09-26 PROCEDURE — 31720 CLEARANCE OF AIRWAYS: CPT

## 2022-09-26 PROCEDURE — 85610 PROTHROMBIN TIME: CPT | Mod: 91

## 2022-09-26 PROCEDURE — 99291 CRITICAL CARE FIRST HOUR: CPT | Mod: ,,, | Performed by: NURSE PRACTITIONER

## 2022-09-26 PROCEDURE — 25000003 PHARM REV CODE 250: Performed by: STUDENT IN AN ORGANIZED HEALTH CARE EDUCATION/TRAINING PROGRAM

## 2022-09-26 PROCEDURE — 25000003 PHARM REV CODE 250: Performed by: INTERNAL MEDICINE

## 2022-09-26 PROCEDURE — 80076 HEPATIC FUNCTION PANEL: CPT | Performed by: NURSE PRACTITIONER

## 2022-09-26 PROCEDURE — 93750 INTERROGATION VAD IN PERSON: CPT | Mod: ,,, | Performed by: INTERNAL MEDICINE

## 2022-09-26 PROCEDURE — 82550 ASSAY OF CK (CPK): CPT | Performed by: STUDENT IN AN ORGANIZED HEALTH CARE EDUCATION/TRAINING PROGRAM

## 2022-09-26 PROCEDURE — 63600175 PHARM REV CODE 636 W HCPCS: Performed by: INTERNAL MEDICINE

## 2022-09-26 PROCEDURE — P9021 RED BLOOD CELLS UNIT: HCPCS | Performed by: STUDENT IN AN ORGANIZED HEALTH CARE EDUCATION/TRAINING PROGRAM

## 2022-09-26 PROCEDURE — 99233 PR SUBSEQUENT HOSPITAL CARE,LEVL III: ICD-10-PCS | Mod: ,,, | Performed by: STUDENT IN AN ORGANIZED HEALTH CARE EDUCATION/TRAINING PROGRAM

## 2022-09-26 PROCEDURE — 99291 PR CRITICAL CARE, E/M 30-74 MINUTES: ICD-10-PCS | Mod: ,,, | Performed by: NURSE PRACTITIONER

## 2022-09-26 PROCEDURE — 27000248 HC VAD-ADDITIONAL DAY

## 2022-09-26 PROCEDURE — P9016 RBC LEUKOCYTES REDUCED: HCPCS | Mod: BL

## 2022-09-26 PROCEDURE — 99292 PR CRITICAL CARE, ADDL 30 MIN: ICD-10-PCS | Mod: ,,, | Performed by: INTERNAL MEDICINE

## 2022-09-26 PROCEDURE — 80048 BASIC METABOLIC PNL TOTAL CA: CPT | Performed by: NURSE PRACTITIONER

## 2022-09-26 PROCEDURE — 99292 CRITICAL CARE ADDL 30 MIN: CPT | Mod: ,,, | Performed by: INTERNAL MEDICINE

## 2022-09-26 PROCEDURE — P9016 RBC LEUKOCYTES REDUCED: HCPCS

## 2022-09-26 PROCEDURE — 25000003 PHARM REV CODE 250: Performed by: NURSE PRACTITIONER

## 2022-09-26 PROCEDURE — 85610 PROTHROMBIN TIME: CPT | Performed by: STUDENT IN AN ORGANIZED HEALTH CARE EDUCATION/TRAINING PROGRAM

## 2022-09-26 PROCEDURE — P9021 RED BLOOD CELLS UNIT: HCPCS | Mod: BL | Performed by: STUDENT IN AN ORGANIZED HEALTH CARE EDUCATION/TRAINING PROGRAM

## 2022-09-26 PROCEDURE — P9017 PLASMA 1 DONOR FRZ W/IN 8 HR: HCPCS

## 2022-09-26 PROCEDURE — 99900035 HC TECH TIME PER 15 MIN (STAT)

## 2022-09-26 PROCEDURE — 83615 LACTATE (LD) (LDH) ENZYME: CPT | Performed by: NURSE PRACTITIONER

## 2022-09-26 PROCEDURE — P9016 RBC LEUKOCYTES REDUCED: HCPCS | Mod: BL | Performed by: STUDENT IN AN ORGANIZED HEALTH CARE EDUCATION/TRAINING PROGRAM

## 2022-09-26 PROCEDURE — 20000000 HC ICU ROOM

## 2022-09-26 PROCEDURE — 85025 COMPLETE CBC W/AUTO DIFF WBC: CPT | Mod: 91

## 2022-09-26 PROCEDURE — 83735 ASSAY OF MAGNESIUM: CPT | Performed by: NURSE PRACTITIONER

## 2022-09-26 PROCEDURE — 83880 ASSAY OF NATRIURETIC PEPTIDE: CPT | Performed by: NURSE PRACTITIONER

## 2022-09-26 PROCEDURE — P9016 RBC LEUKOCYTES REDUCED: HCPCS | Performed by: STUDENT IN AN ORGANIZED HEALTH CARE EDUCATION/TRAINING PROGRAM

## 2022-09-26 PROCEDURE — P9017 PLASMA 1 DONOR FRZ W/IN 8 HR: HCPCS | Mod: BL

## 2022-09-26 PROCEDURE — 85025 COMPLETE CBC W/AUTO DIFF WBC: CPT | Mod: 91 | Performed by: NURSE PRACTITIONER

## 2022-09-26 PROCEDURE — 63600175 PHARM REV CODE 636 W HCPCS: Performed by: STUDENT IN AN ORGANIZED HEALTH CARE EDUCATION/TRAINING PROGRAM

## 2022-09-26 PROCEDURE — 99233 SBSQ HOSP IP/OBS HIGH 50: CPT | Mod: ,,, | Performed by: STUDENT IN AN ORGANIZED HEALTH CARE EDUCATION/TRAINING PROGRAM

## 2022-09-26 PROCEDURE — 94761 N-INVAS EAR/PLS OXIMETRY MLT: CPT

## 2022-09-26 PROCEDURE — 85025 COMPLETE CBC W/AUTO DIFF WBC: CPT | Performed by: INTERNAL MEDICINE

## 2022-09-26 PROCEDURE — 93750 PR INTERROGATE VENT ASSIST DEV, IN PERSON, W PHYSICIAN ANALYSIS: ICD-10-PCS | Mod: ,,, | Performed by: INTERNAL MEDICINE

## 2022-09-26 RX ORDER — POTASSIUM CHLORIDE 20 MEQ/1
20 TABLET, EXTENDED RELEASE ORAL ONCE
Status: COMPLETED | OUTPATIENT
Start: 2022-09-26 | End: 2022-09-26

## 2022-09-26 RX ORDER — HYDROCODONE BITARTRATE AND ACETAMINOPHEN 500; 5 MG/1; MG/1
TABLET ORAL
Status: DISCONTINUED | OUTPATIENT
Start: 2022-09-26 | End: 2022-10-05 | Stop reason: HOSPADM

## 2022-09-26 RX ORDER — HYDROCODONE BITARTRATE AND ACETAMINOPHEN 500; 5 MG/1; MG/1
TABLET ORAL
Status: DISCONTINUED | OUTPATIENT
Start: 2022-09-26 | End: 2022-09-26

## 2022-09-26 RX ORDER — LANOLIN ALCOHOL/MO/W.PET/CERES
400 CREAM (GRAM) TOPICAL ONCE
Status: COMPLETED | OUTPATIENT
Start: 2022-09-26 | End: 2022-09-26

## 2022-09-26 RX ADMIN — LEVOTHYROXINE SODIUM 50 MCG: 50 TABLET ORAL at 08:09

## 2022-09-26 RX ADMIN — Medication 400 MG: at 12:09

## 2022-09-26 RX ADMIN — DAPTOMYCIN 540 MG: 350 INJECTION, POWDER, LYOPHILIZED, FOR SOLUTION INTRAVENOUS at 05:09

## 2022-09-26 RX ADMIN — CEFEPIME 2 G: 2 INJECTION, POWDER, FOR SOLUTION INTRAVENOUS at 11:09

## 2022-09-26 RX ADMIN — MEXILETINE HYDROCHLORIDE 250 MG: 250 CAPSULE ORAL at 10:09

## 2022-09-26 RX ADMIN — MIRTAZAPINE 30 MG: 30 TABLET, FILM COATED ORAL at 08:09

## 2022-09-26 RX ADMIN — CEFEPIME 2 G: 2 INJECTION, POWDER, FOR SOLUTION INTRAVENOUS at 04:09

## 2022-09-26 RX ADMIN — PANTOPRAZOLE SODIUM 40 MG: 40 TABLET, DELAYED RELEASE ORAL at 12:09

## 2022-09-26 RX ADMIN — POTASSIUM CHLORIDE 20 MEQ: 1500 TABLET, EXTENDED RELEASE ORAL at 08:09

## 2022-09-26 RX ADMIN — CEFEPIME 2 G: 2 INJECTION, POWDER, FOR SOLUTION INTRAVENOUS at 08:09

## 2022-09-26 RX ADMIN — AMIODARONE HYDROCHLORIDE 400 MG: 200 TABLET ORAL at 08:09

## 2022-09-26 RX ADMIN — MEXILETINE HYDROCHLORIDE 250 MG: 250 CAPSULE ORAL at 03:09

## 2022-09-26 RX ADMIN — ALPRAZOLAM 1 MG: 0.5 TABLET ORAL at 08:09

## 2022-09-26 RX ADMIN — Medication 400 MG: at 08:09

## 2022-09-26 NOTE — PROGRESS NOTES
[facility] - Cardiac Intensive Care  Heart Transplant  Progress Note    Patient Name: Tim Richards  MRN: [id number]  Admission Date: 9/24/2022  Hospital Length of Stay: 2 days  Attending Physician: Antonio Hadley Jr.,*  Primary Care Provider: Deyanira Booth MD  Principal Problem:<principal problem not specified>    Subjective:     **Interval History: Continues to feel well. Transfusing to keep Hgb > 10. Got 2.5 mg Vit K yesterday and FFP this morning with goal INR < 1.7 with these values per request of Vasc Surgery. Plan for rt ileo-fem bypass and rt groin exploration tomorrow. Continuing empiric Daptomycin and Cefepime per ID rec.Cultures of right femoral wound and blood done 9/25 with NGTD, afebrile and no leukocytosis    Continuous Infusions:  Scheduled Meds:   amiodarone  400 mg Oral Daily    ceFEPime (MAXIPIME) IVPB  2 g Intravenous Q8H    DAPTOmycin (CUBICIN) IV (PEDS and ADULTS)  6 mg/kg Intravenous Q24H    levothyroxine  50 mcg Oral Before breakfast    magnesium oxide  400 mg Oral Daily with lunch    mexiletine  250 mg Oral Q8H    mirtazapine  30 mg Oral QHS    pantoprazole  40 mg Oral Daily with lunch    polyethylene glycol  17 g Oral Daily     PRN Meds:sodium chloride, sodium chloride 0.9%, sodium chloride 0.9%, ALPRAZolam, zolpidem    Review of patient's allergies indicates:   Allergen Reactions    Lisinopril Anaphylaxis    Hydralazine analogues      Chronic constipation, impotence, dizziness     Objective:     Vital Signs (Most Recent):  Temp: 98.2 °F (36.8 °C) (09/26/22 1105)  Pulse: 81 (09/26/22 1105)  Resp: 17 (09/26/22 1105)  BP: (!) 84/0 (09/26/22 1105)  SpO2: 96 % (09/26/22 1105)   Vital Signs (24h Range):  Temp:  [97.8 °F (36.6 °C)-98.5 °F (36.9 °C)] 98.2 °F (36.8 °C)  Pulse:  [] 81  Resp:  [10-33] 17  SpO2:  [81 %-99 %] 96 %  BP: ()/(0-76) 84/0     Patient Vitals for the past 72 hrs (Last 3 readings):   Weight   09/25/22 0105 90.2 kg (198 lb 13.7 oz)   09/24/22  2136 90.2 kg (198 lb 13.7 oz)   09/24/22 1758 86.2 kg (190 lb)     Body mass index is 26.24 kg/m².      Intake/Output Summary (Last 24 hours) at 9/26/2022 1129  Last data filed at 9/26/2022 1005  Gross per 24 hour   Intake 2777.81 ml   Output 2000 ml   Net 777.81 ml       Hemodynamic Parameters:       Telemetry: SR    Physical Exam  Constitutional:       Appearance: Normal appearance.   HENT:      Head: Normocephalic and atraumatic.   Eyes:      Extraocular Movements: Extraocular movements intact.      Pupils: Pupils are equal, round, and reactive to light.   Neck:      Comments: Neck veins are not elevated  Cardiovascular:      Rate and Rhythm: Normal rate and regular rhythm.      Comments: Smooth VAD hum  Pulmonary:      Effort: Pulmonary effort is normal.      Breath sounds: Normal breath sounds.   Abdominal:      General: Bowel sounds are normal.      Palpations: Abdomen is soft.   Musculoskeletal:         General: No swelling. Normal range of motion.      Cervical back: Normal range of motion and neck supple.   Skin:     General: Skin is warm and dry.      Capillary Refill: Capillary refill takes 2 to 3 seconds.   Neurological:      General: No focal deficit present.      Mental Status: He is alert and oriented to person, place, and time.   Psychiatric:         Mood and Affect: Mood normal.         Behavior: Behavior normal.         Thought Content: Thought content normal.         Judgment: Judgment normal.       Significant Labs:  CBC:  Recent Labs   Lab 09/25/22  1407 09/25/22  2253 09/26/22  0521   WBC 5.37  5.37 5.78 6.81   RBC 2.88*  2.88* 3.04* 3.32*   HGB 7.6*  7.6* 8.0* 9.0*   HCT 26.2*  26.2* 28.2* 29.7*     290 277 252   MCV 91  91 93 90   MCH 26.4*  26.4* 26.3* 27.1   MCHC 29.0*  29.0* 28.4* 30.3*     BNP:  Recent Labs   Lab 09/22/22  1225 09/24/22  1803 09/26/22  0353   * 229* 97     CMP:  Recent Labs   Lab 09/24/22  1803 09/25/22  0903 09/26/22  0353   GLU 79 74 123*   CALCIUM  8.9 8.4* 8.3*   ALBUMIN 2.6* 2.3* 2.2*   PROT 6.5 5.8* 5.7*    137 137   K 4.3 4.7 3.8   CO2 22* 24 24    106 105   BUN 11 11 12   CREATININE 1.6* 1.5* 1.6*   ALKPHOS 94 81 81   ALT 20 16 16   AST 31 31 28   BILITOT 0.4 0.4 0.5      Coagulation:   Recent Labs   Lab 09/24/22  1803 09/25/22  0903 09/26/22  0353   INR 3.0* 2.8* 2.3*     LDH:  Recent Labs   Lab 09/26/22  0353   *     Microbiology:  Microbiology Results (last 7 days)       Procedure Component Value Units Date/Time    Aerobic culture [[id number]]  (Abnormal) Collected: 09/25/22 0815    Order Status: Completed Specimen: Wound from Groin Updated: 09/26/22 0801     Aerobic Bacterial Culture GRAM NEGATIVE LAURA  Few  Identification and susceptibility pending      Culture, Anaerobe [[id number]] Collected: 09/25/22 0815    Order Status: Completed Specimen: Wound from Groin Updated: 09/26/22 0703     Anaerobic Culture Culture in progress    Blood culture [[id number]] Collected: 09/25/22 0133    Order Status: Completed Specimen: Blood from Peripheral, Lower Arm, Left Updated: 09/26/22 0613     Blood Culture, Routine No Growth to date      No Growth to date    Narrative:      Collection has been rescheduled by TR3 at 09/25/2022 00:31 Reason: Pt   dont wanna get stuck rn rachille  Collection has been rescheduled by TR3 at 09/25/2022 00:31 Reason: Pt   dont wanna get stuck rn rachille    Blood culture [[id number]] Collected: 09/25/22 0138    Order Status: Completed Specimen: Blood from Peripheral, Hand, Right Updated: 09/26/22 0613     Blood Culture, Routine No Growth to date      No Growth to date    Narrative:      Collection has been rescheduled by TR3 at 09/25/2022 00:31 Reason: Pt   dont wanna get stuck rn rachille  Collection has been rescheduled by TR3 at 09/25/2022 00:31 Reason: Pt   dont wanna get stuck rn rachille    Fungus culture [[id number]] Collected: 09/25/22 0815    Order Status: Sent Specimen: Wound from Groin Updated: 09/25/22 0918     Blood culture [[id number]]     Order Status: Canceled Specimen: Blood     Blood culture [[id number]]     Order Status: Canceled Specimen: Blood             I have reviewed all pertinent labs within the past 24 hours.    Estimated Creatinine Clearance: 59 mL/min (A) (based on SCr of 1.6 mg/dL (H)).    Diagnostic Results:  I have reviewed and interpreted all pertinent imaging results/findings within the past 24 hours.    Assessment and Plan:     Tim Richards is a 56 y.o.  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  His post operative course was complicated by cardiogenic shock/RV failure requiring VA-ECMO.  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  He has a 8 Fr cuffed Shiley trach.  He had a prolonged and complicated hospital course from 6/24/22 - 9/4/22, was discharged to rehab (see clinic visit from 9/22/22 for additional details).  Of note he was last seen in clinic by Dr. Ragsdale on 9/22/22 after being discharged from rehab and at the time was noted to have purulence from the groin site and was placed on doxycycline PO.  He has had complaints of fatigue since his last hospitalization, per his wife.  This morning he was sitting and was crossing his legs for a prolonged period of time, and when he uncrossed them he noticed a pool of blood under him and active bleeding from his R groin where his previous ECMO cannula was.  He began to feel lightheaded when he tried walking.  Per wife he had 2 low flow alarms at home.      In the ED, patient stated he felt at his baseline and had no complaints.  No active bleeding at the time of my evaluation from R groin site.  Doppler BP was 80/0.  Hgb dropped from 9.4 -> 8.9 over the past 2 days.  INR was 3.0.  CT Angio performed per CV surgery recommendations.   HTS was consulted for admission, CT surgery was consulted in the ED for evaluation of groin hematoma.       Cardiovascular Studies  TTE 8/30/22   There is an LVAD present. Base speed is 6300 RPMs. The pump type is a Heartmate III. The interventricular septum appears midline. The aortic valve does not open.   The left ventricle is severely enlarged with eccentric hypertrophy and severely decreased systolic function.   The estimated ejection fraction is 10%.   There is left ventricular global hypokinesis.   Left ventricular diastolic dysfunction.   There is trivial continuous aortic regurgitation.   There is abnormal septal wall motion.   The right ventricle is poorly seen, but appears enlarged with reduced systolic function.   Mild tricuspid regurgitation.      Pseudoaneurysm of femoral artery  Vascular surgery consulted, appreciate recommendations  CTA shows 3 new pseudoaneurysms of R femoral artery  Continuing Doxy 100mg bid for now    Groin hematoma  -Was on VA-ECMO at last hospitalization with successful decannulation  -CTA revealed 3 possible new pseudoaneurysms  -INR 3 on admission, 2.8 yesterday (got 2.5 mg Vit K po) and 2.3 this morning (got FFP per Vasc Surgery) Holding warfarin at this time with INR goal < 1.7  -Hgb was trending down since admit. Transfusing to keep Hgb > 10  -Cultures from right groin wound and blood done 9/25/with NGTD. Continue empiric Cefepime and Daptomycin (not using Vanc 2/2 recent dye load with CTA)    VT (ventricular tachycardia)  -Patient has [employer] SICD  -Currently on amiodarone 400mg qd and mexiletine 250mg TID, will continue     LVAD (left ventricular assist device) present  -S/P S/P DT HM2 3/8/2018 then S/P DT HM3 exchange 7/13/2022 2/2 to thrombosis of HM2 thought caused by COVID PNA  -Current speed 6300  -Last TTE done 8/30/2022 at 6300: LVEDD 7.44, LVEF 10%, RV appears enlarged and decreased, trace AI, trace MR, mild TR, PAS > 28, IVC 3, AV does not open, septum  midline  -Coumadin on hold 2/2 active bleeding right groin requiring transfusions and plan for surgical repair tomorrow  -LDH is stable    Procedure: Device Interrogation Including analysis of device parameters  Current Settings: Ventricular Assist Device  Review of device function is stable    TXP LVAD INTERROGATIONS 9/26/2022 9/26/2022 9/26/2022 9/26/2022 9/26/2022 9/26/2022 9/26/2022   Type HeartMate3 HeartMate3 HeartMate3 HeartMate3 HeartMate3 HeartMate3 HeartMate3   Flow 5.4 5.5 5.4 5.4 5.5 5.6 5.4   Speed 6300 6300 6300 6350 6300 6300 6300   PI 2 1.7 2.1 2.3 2.1 1.9 1.8   Power (Jackson) 5.4 5.4 5.3 5.3 5.3 5.2 5.2   LSL - - - - 5900 5900 5900   Low Flow Alarm - - - - - - -   High Power Alarm - - - - - - -   Pulsatility - - - - Intermittent pulse - -         Chronic combined systolic and diastolic heart failure  Currently appears clinically euvolemic  Maintain K>4.0, Mg>2.0      Uninterrupted Critical Care/Counseling Time (not including procedures): 60 minutes      Maira Crum NP [id number]  Heart Transplant  [facility] - Cardiac Intensive [facility]

## 2022-09-26 NOTE — PROGRESS NOTES
09/26/2022  Carissa Dean    Current provider:  Antonio Hadley Jr.,*    Device interrogation:  TXP LVAD INTERROGATIONS 9/26/2022 9/26/2022 9/26/2022 9/26/2022 9/26/2022 9/26/2022 9/26/2022   Type HeartMate3 HeartMate3 HeartMate3 HeartMate3 HeartMate3 HeartMate3 HeartMate3   Flow 5.4 5.5 5.4 5.4 5.5 5.6 5.4   Speed 6300 6300 6300 6350 6300 6300 6300   PI 2 1.7 2.1 2.3 2.1 1.9 1.8   Power (Jackson) 5.4 5.4 5.3 5.3 5.3 5.2 5.2   LSL - - - - 5900 5900 5900   Low Flow Alarm - - - - - - -   High Power Alarm - - - - - - -   Pulsatility - - - - Intermittent pulse - -          Rounded on Tim Richards to ensure all mechanical assist device settings (IABP or VAD) were appropriate and all parameters were within limits.  I was able to ensure all back up equipment was present, the staff had no issues, and the Perfusion Department daily rounding was complete.      For implantable VADs: Interrogation of Ventricular assist device was performed with analysis of device parameters and review of device function. I have personally reviewed the interrogation findings and agree with findings as stated.     In emergency, the nursing units have been notified to contact the perfusion department either by:  Calling [phone] from 630am to 4pm Mon thru Fri, utilizing the On-Call Finder functionality of Epic and searching for Perfusion, or by contacting the hospital  from 4pm to 630am and on weekends and asking to speak with the perfusionist on call.    11:11 AM

## 2022-09-26 NOTE — ASSESSMENT & PLAN NOTE
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 CTA with new dilatation of R fem artery. Superficial wound cx with GNR thus far. Blcx unrevealing. Tentative plan for OR with vascular/plastics 9/26.     Recommendations:  -continue empiric daptomycin and cefepime; avoiding vancomycin given contrast load   -monitor for toxicities CK weekly   -recommend intraop cultures and pathology to tailor abx therapy, will de-escalate once OR cx obtained  -follow up cx

## 2022-09-26 NOTE — SUBJECTIVE & OBJECTIVE
Medications:  Continuous Infusions:  Scheduled Meds:   amiodarone  400 mg Oral Daily    ceFEPime (MAXIPIME) IVPB  2 g Intravenous Q8H    DAPTOmycin (CUBICIN) IV (PEDS and ADULTS)  6 mg/kg Intravenous Q24H    levothyroxine  50 mcg Oral Before breakfast    magnesium oxide  400 mg Oral Daily with lunch    mexiletine  250 mg Oral Q8H    mirtazapine  30 mg Oral QHS    pantoprazole  40 mg Oral Daily with lunch    polyethylene glycol  17 g Oral Daily     PRN Meds:sodium chloride, sodium chloride, sodium chloride, sodium chloride, sodium chloride, sodium chloride 0.9%, sodium chloride 0.9%, ALPRAZolam, hydrALAZINE, zolpidem     Objective:     Vital Signs (Most Recent):  Temp: 98.2 °F (36.8 °C) (09/26/22 0301)  Pulse: 86 (09/26/22 0600)  Resp: (!) 22 (09/26/22 0600)  BP: (!) 76/0 (09/26/22 0600)  SpO2: 95 % (09/26/22 0600)   Vital Signs (24h Range):  Temp:  [97.8 °F (36.6 °C)-98.3 °F (36.8 °C)] 98.2 °F (36.8 °C)  Pulse:  [] 86  Resp:  [10-33] 22  SpO2:  [81 %-99 %] 95 %  BP: (76-90)/(0) 76/0         Physical Exam  Constitutional:       Appearance: Normal appearance.   HENT:      Head: Normocephalic and atraumatic.      Mouth/Throat:      Mouth: Mucous membranes are moist.   Eyes:      Pupils: Pupils are equal, round, and reactive to light.   Cardiovascular:      Rate and Rhythm: Regular rhythm.   Abdominal:      General: Abdomen is flat.      Palpations: Abdomen is soft.   Musculoskeletal:      Comments: 5/5 strength all 4 extremities, palpable PT and DP pulses bilaterally.   Right groin area with wound vac and CLEO drain   Neurological:      Mental Status: He is alert.       Significant Labs:  ABGs: No results for input(s): PH, PCO2, PO2, HCO3, POCSATURATED, BE in the last 48 hours.  Cardiac markers: No results for input(s): CKMB, CPKMB, TROPONINT, TROPONINI, MYOGLOBIN in the last 48 hours.  CBC:   Recent Labs   Lab 09/28/22  0742   WBC 14.56*   RBC 3.13*   HGB 8.8*   HCT 28.0*      MCV 90   MCH 28.1   MCHC  31.4*     CMP:   Recent Labs   Lab 09/28/22  0332   GLU 80   CALCIUM 8.6*   ALBUMIN 2.2*   PROT 5.5*   *   K 4.9   CO2 22*      BUN 14   CREATININE 1.1   ALKPHOS 75   ALT 15   AST 31   BILITOT 0.5     Coagulation:   Recent Labs   Lab 09/28/22 0332   LABPROT 12.3   INR 1.2     Lactic Acid: No results for input(s): LACTATE in the last 48 hours.  All pertinent labs from the last 24 hours have been reviewed.    Significant Diagnostics:  I have reviewed all pertinent imaging results/findings within the past 24 hours.

## 2022-09-26 NOTE — NURSING
Pt resting in bed, no family at bedside. Trach remains capped. Pt is scheduled for the OR tomorrow with Vascular Surgery and Plastics for right ileo-fem bypass and right groin exploration and flap. Pt denies any questions at this time. VAD parameters within patient's normal limits.

## 2022-09-26 NOTE — PROGRESS NOTES
[facility] - Cardiac Intensive Care  Infectious Disease  Progress Note    Patient Name: Tim Richards  MRN: [id number]  Admission Date: 9/24/2022  Length of Stay: 2 days  Attending Physician: Antonio Hadley Jr.,*  Primary Care Provider: Deyanira Booth MD    Isolation Status: No active isolations  Assessment/Plan:      Mycotic aneurysm  See pseudoaneurysm    Pseudoaneurysm of femoral artery  Concern for infected pseudoaneurysm/mycotic aneurysm given purulent drainage noted over prior cannulation site. CTA with new dilatation of R fem artery. Superficial wound cx with GNR thus far. Blcx unrevealing. Tentative plan for OR with vascular/plastics 9/26.     Recommendations:  -continue empiric daptomycin and cefepime; avoiding vancomycin given contrast load   -monitor for toxicities CK weekly   -recommend intraop cultures and pathology to tailor abx therapy, will de-escalate once OR cx obtained  -follow up cx      Groin hematoma  See pseudoaneurysm            Thank you for your consult. I will follow-up with patient. Please contact us if you have any additional questions. D/w primary team.    Time: 35 minutes   50% of time spent on face-to-face counseling and coordination of care. Counseling included review of test results, diagnosis, and treatment plan with patient and/or family.        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 Pt reported that he was seen in HTS clinic on 9/22 and was noted to have  purulent drainage. He was given a course of doxycycline. Admission notable for CTA with new dilatation of R fem artery in the region of prior soft tissue density in R groin from prior ECMO cannulation site. Surgical disposition pending. Blcx ngtd and wound cultures from R groin in process. Pt is currently on doxycycline.     Interval History: No fevers documented overnight.   Wound cx with GNR. Plan for OR tomorrow.     Review of Systems   Constitutional:  Negative for chills and fever.   All other systems reviewed and are negative.  Objective:     Vital Signs (Most Recent):  Temp: 98.4 °F (36.9 °C) (09/26/22 0951)  Pulse: 81 (09/26/22 1005)  Resp: 19 (09/26/22 1005)  BP: (!) 84/61 (09/26/22 1005)  SpO2: 95 % (09/26/22 1005)   Vital Signs (24h Range):  Temp:  [97.8 °F (36.6 °C)-98.5 °F (36.9 °C)] 98.4 °F (36.9 °C)  Pulse:  [] 81  Resp:  [10-33] 19  SpO2:  [81 %-99 %] 95 %  BP: ()/(0-76) 84/61     Weight: 90.2 kg (198 lb 13.7 oz)  Body mass index is 26.24 kg/m².    Estimated Creatinine Clearance: 59 mL/min (A) (based on SCr of 1.6 mg/dL (H)).    Physical Exam  Constitutional:       General: He is not in acute distress.     Appearance: He is not ill-appearing or toxic-appearing.   HENT:      Head: Normocephalic and atraumatic.      Right Ear: External ear normal.      Left Ear: External ear normal.      Mouth/Throat:      Mouth: Mucous membranes are moist.      Pharynx: No oropharyngeal exudate or posterior oropharyngeal erythema.   Eyes:      General: No scleral icterus.        Right eye: No discharge.         Left eye: No discharge.   Neck:      Comments: Trach - minimal drainage present  Cardiovascular:      Comments: VAD  Pulmonary:      Effort: Pulmonary effort is normal. No respiratory distress.   Abdominal:      General: There is no distension.      Palpations: Abdomen is soft.      Tenderness: There is no abdominal tenderness. There is no guarding.      Comments: DLES dressed   Musculoskeletal:          General: No swelling or tenderness.      Right lower leg: No edema.      Left lower leg: No edema.   Skin:     General: Skin is warm and dry.      Findings: No erythema or rash.      Comments: R groin - dressing in place, mildly saturated with bloody fluid   Neurological:      Mental Status: He is alert and oriented to person, place, and time. Mental status is at baseline.      Motor: No weakness.   Psychiatric:         Mood and Affect: Mood normal.         Behavior: Behavior normal.       Significant Labs:   Microbiology Results (last 7 days)       Procedure Component Value Units Date/Time    Aerobic culture [[id number]]  (Abnormal) Collected: 09/25/22 0815    Order Status: Completed Specimen: Wound from Groin Updated: 09/26/22 0801     Aerobic Bacterial Culture GRAM NEGATIVE LAURA  Few  Identification and susceptibility pending      Culture, Anaerobe [[id number]] Collected: 09/25/22 0815    Order Status: Completed Specimen: Wound from Groin Updated: 09/26/22 0703     Anaerobic Culture Culture in progress    Blood culture [[id number]] Collected: 09/25/22 0133    Order Status: Completed Specimen: Blood from Peripheral, Lower Arm, Left Updated: 09/26/22 0613     Blood Culture, Routine No Growth to date      No Growth to date    Narrative:      Collection has been rescheduled by TR3 at 09/25/2022 00:31 Reason: Pt   dont wanna get stuck rn rachille  Collection has been rescheduled by TR3 at 09/25/2022 00:31 Reason: Pt   dont wanna get stuck rn rachille    Blood culture [[id number]] Collected: 09/25/22 0138    Order Status: Completed Specimen: Blood from Peripheral, Hand, Right Updated: 09/26/22 0613     Blood Culture, Routine No Growth to date      No Growth to date    Narrative:      Collection has been rescheduled by TR3 at 09/25/2022 00:31 Reason: Pt   dont wanna get stuck rn rachille  Collection has been rescheduled by TR3 at 09/25/2022 00:31 Reason: Pt   dont wanna get stuck rn rachille    Fungus culture  [[id number]] Collected: 09/25/22 0815    Order Status: Sent Specimen: Wound from Groin Updated: 09/25/22 0918    Blood culture [[id number]]     Order Status: Canceled Specimen: Blood     Blood culture [[id number]]     Order Status: Canceled Specimen: Blood             Significant Imaging: I have reviewed all pertinent imaging results/findings within the past 24 hours.

## 2022-09-26 NOTE — PROGRESS NOTES
Groin shaved and CHG bath completed in preparation for surgery tomorrow.  Blood and procedural consents in the chart, and anesthesia consent in Epic.

## 2022-09-26 NOTE — SUBJECTIVE & OBJECTIVE
Medications:  Continuous Infusions:  Scheduled Meds:   amiodarone  400 mg Oral Daily    ceFEPime (MAXIPIME) IVPB  2 g Intravenous Q8H    DAPTOmycin (CUBICIN) IV (PEDS and ADULTS)  6 mg/kg Intravenous Q24H    levothyroxine  50 mcg Oral Before breakfast    magnesium oxide  400 mg Oral Daily with lunch    mexiletine  250 mg Oral Q8H    mirtazapine  30 mg Oral QHS    pantoprazole  40 mg Oral Daily with lunch    polyethylene glycol  17 g Oral Daily     PRN Meds:sodium chloride, sodium chloride, sodium chloride, sodium chloride, sodium chloride, sodium chloride 0.9%, sodium chloride 0.9%, ALPRAZolam, hydrALAZINE, zolpidem     Objective:     Vital Signs (Most Recent):  Temp: 98.2 °F (36.8 °C) (09/26/22 0301)  Pulse: 86 (09/26/22 0600)  Resp: (!) 22 (09/26/22 0600)  BP: (!) 76/0 (09/26/22 0600)  SpO2: 95 % (09/26/22 0600)   Vital Signs (24h Range):  Temp:  [97.8 °F (36.6 °C)-98.3 °F (36.8 °C)] 98.2 °F (36.8 °C)  Pulse:  [] 86  Resp:  [10-33] 22  SpO2:  [81 %-99 %] 95 %  BP: (76-90)/(0) 76/0         Physical Exam  Constitutional:       Appearance: Normal appearance.   HENT:      Head: Normocephalic and atraumatic.      Mouth/Throat:      Mouth: Mucous membranes are moist.   Eyes:      Pupils: Pupils are equal, round, and reactive to light.   Cardiovascular:      Rate and Rhythm: Regular rhythm.   Abdominal:      General: Abdomen is flat.      Palpations: Abdomen is soft.      Comments: Previous drive line site open healing with granulation tissue, no purulence noted   Musculoskeletal:      Comments: 5/5 strength all 4 extremities, palpable femoral, PT and DP pulses bilaterally. Right groin cutdown site open with granulation tissue and thrombus in wound bed, no active bleeding.    Neurological:      Mental Status: He is alert.       Significant Labs:  CBC:   Recent Labs   Lab 09/26/22  0521   WBC 6.81   RBC 3.32*   HGB 9.0*   HCT 29.7*      MCV 90   MCH 27.1   MCHC 30.3*     CMP:   Recent Labs   Lab  09/26/22  0353   *   CALCIUM 8.3*   ALBUMIN 2.2*   PROT 5.7*      K 3.8   CO2 24      BUN 12   CREATININE 1.6*   ALKPHOS 81   ALT 16   AST 28   BILITOT 0.5       Significant Diagnostics:  I have reviewed all pertinent imaging results/findings within the past 24 hours.

## 2022-09-26 NOTE — SUBJECTIVE & OBJECTIVE
**Interval History: Continues to feel well. Transfusing to keep Hgb > 10. Got 2.5 mg Vit K yesterday and FFP this morning with goal INR < 1.7 with these values per request of Vasc Surgery. Plan for rt ileo-fem bypass and rt groin exploration tomorrow. Continuing empiric Daptomycin and Cefepime per ID rec.Cultures of right femoral wound and blood done 9/25 with NGTD, afebrile and no leukocytosis    Continuous Infusions:  Scheduled Meds:   amiodarone  400 mg Oral Daily    ceFEPime (MAXIPIME) IVPB  2 g Intravenous Q8H    DAPTOmycin (CUBICIN) IV (PEDS and ADULTS)  6 mg/kg Intravenous Q24H    levothyroxine  50 mcg Oral Before breakfast    magnesium oxide  400 mg Oral Daily with lunch    mexiletine  250 mg Oral Q8H    mirtazapine  30 mg Oral QHS    pantoprazole  40 mg Oral Daily with lunch    polyethylene glycol  17 g Oral Daily     PRN Meds:sodium chloride, sodium chloride 0.9%, sodium chloride 0.9%, ALPRAZolam, zolpidem    Review of patient's allergies indicates:   Allergen Reactions    Lisinopril Anaphylaxis    Hydralazine analogues      Chronic constipation, impotence, dizziness     Objective:     Vital Signs (Most Recent):  Temp: 98.2 °F (36.8 °C) (09/26/22 1105)  Pulse: 81 (09/26/22 1105)  Resp: 17 (09/26/22 1105)  BP: (!) 84/0 (09/26/22 1105)  SpO2: 96 % (09/26/22 1105)   Vital Signs (24h Range):  Temp:  [97.8 °F (36.6 °C)-98.5 °F (36.9 °C)] 98.2 °F (36.8 °C)  Pulse:  [] 81  Resp:  [10-33] 17  SpO2:  [81 %-99 %] 96 %  BP: ()/(0-76) 84/0     Patient Vitals for the past 72 hrs (Last 3 readings):   Weight   09/25/22 0105 90.2 kg (198 lb 13.7 oz)   09/24/22 2136 90.2 kg (198 lb 13.7 oz)   09/24/22 1758 86.2 kg (190 lb)     Body mass index is 26.24 kg/m².      Intake/Output Summary (Last 24 hours) at 9/26/2022 1129  Last data filed at 9/26/2022 1005  Gross per 24 hour   Intake 2777.81 ml   Output 2000 ml   Net 777.81 ml       Hemodynamic Parameters:       Telemetry: SR    Physical Exam  Constitutional:        Appearance: Normal appearance.   HENT:      Head: Normocephalic and atraumatic.   Eyes:      Extraocular Movements: Extraocular movements intact.      Pupils: Pupils are equal, round, and reactive to light.   Neck:      Comments: Neck veins are not elevated  Cardiovascular:      Rate and Rhythm: Normal rate and regular rhythm.      Comments: Smooth VAD hum  Pulmonary:      Effort: Pulmonary effort is normal.      Breath sounds: Normal breath sounds.   Abdominal:      General: Bowel sounds are normal.      Palpations: Abdomen is soft.   Musculoskeletal:         General: No swelling. Normal range of motion.      Cervical back: Normal range of motion and neck supple.   Skin:     General: Skin is warm and dry.      Capillary Refill: Capillary refill takes 2 to 3 seconds.   Neurological:      General: No focal deficit present.      Mental Status: He is alert and oriented to person, place, and time.   Psychiatric:         Mood and Affect: Mood normal.         Behavior: Behavior normal.         Thought Content: Thought content normal.         Judgment: Judgment normal.       Significant Labs:  CBC:  Recent Labs   Lab 09/25/22  1407 09/25/22  2253 09/26/22  0521   WBC 5.37  5.37 5.78 6.81   RBC 2.88*  2.88* 3.04* 3.32*   HGB 7.6*  7.6* 8.0* 9.0*   HCT 26.2*  26.2* 28.2* 29.7*     290 277 252   MCV 91  91 93 90   MCH 26.4*  26.4* 26.3* 27.1   MCHC 29.0*  29.0* 28.4* 30.3*     BNP:  Recent Labs   Lab 09/22/22  1225 09/24/22  1803 09/26/22  0353   * 229* 97     CMP:  Recent Labs   Lab 09/24/22  1803 09/25/22  0903 09/26/22  0353   GLU 79 74 123*   CALCIUM 8.9 8.4* 8.3*   ALBUMIN 2.6* 2.3* 2.2*   PROT 6.5 5.8* 5.7*    137 137   K 4.3 4.7 3.8   CO2 22* 24 24    106 105   BUN 11 11 12   CREATININE 1.6* 1.5* 1.6*   ALKPHOS 94 81 81   ALT 20 16 16   AST 31 31 28   BILITOT 0.4 0.4 0.5      Coagulation:   Recent Labs   Lab 09/24/22  1803 09/25/22  0903 09/26/22  0353   INR 3.0* 2.8* 2.3*      LDH:  Recent Labs   Lab 09/26/22  0353   *     Microbiology:  Microbiology Results (last 7 days)       Procedure Component Value Units Date/Time    Aerobic culture [[id number]]  (Abnormal) Collected: 09/25/22 0815    Order Status: Completed Specimen: Wound from Groin Updated: 09/26/22 0801     Aerobic Bacterial Culture GRAM NEGATIVE LAURA  Few  Identification and susceptibility pending      Culture, Anaerobe [[id number]] Collected: 09/25/22 0815    Order Status: Completed Specimen: Wound from Groin Updated: 09/26/22 0703     Anaerobic Culture Culture in progress    Blood culture [[id number]] Collected: 09/25/22 0133    Order Status: Completed Specimen: Blood from Peripheral, Lower Arm, Left Updated: 09/26/22 0613     Blood Culture, Routine No Growth to date      No Growth to date    Narrative:      Collection has been rescheduled by TR3 at 09/25/2022 00:31 Reason: Pt   dont wanna get stuck rn rachille  Collection has been rescheduled by TR3 at 09/25/2022 00:31 Reason: Pt   dont wanna get stuck rn rachille    Blood culture [[id number]] Collected: 09/25/22 0138    Order Status: Completed Specimen: Blood from Peripheral, Hand, Right Updated: 09/26/22 0613     Blood Culture, Routine No Growth to date      No Growth to date    Narrative:      Collection has been rescheduled by TR3 at 09/25/2022 00:31 Reason: Pt   dont wanna get stuck rn rachille  Collection has been rescheduled by TR3 at 09/25/2022 00:31 Reason: Pt   dont wanna get stuck rn rachille    Fungus culture [[id number]] Collected: 09/25/22 0815    Order Status: Sent Specimen: Wound from Groin Updated: 09/25/22 0918    Blood culture [[id number]]     Order Status: Canceled Specimen: Blood     Blood culture [[id number]]     Order Status: Canceled Specimen: Blood             I have reviewed all pertinent labs within the past 24 hours.    Estimated Creatinine Clearance: 59 mL/min (A) (based on SCr of 1.6 mg/dL (H)).    Diagnostic Results:  I have reviewed and  interpreted all pertinent imaging results/findings within the past 24 hours.

## 2022-09-26 NOTE — ANESTHESIA PREPROCEDURE EVALUATION
[facility]  Anesthesia Pre-Operative Evaluation         Patient Name: Tim Richards  YOB: 1965  MRN: [id number]    SUBJECTIVE:     Pre-operative evaluation for Procedure(s) (LRB):  CREATION, BYPASS, ARTERIAL, ILIAC TO FEMORAL (N/A)     09/26/2022    Tim Richards is a 56 y.o. male w/ a significant PMHx of HTN, HLD, VT, A-flutter, Hyperthyroidism, CKD, PVD, HFrEF (EF 10%) with AICD s/p LVAD 7/13/22 c/b thrombosis 2/2 COVID PNA. Post-op c/b cardiogenic shock requiring [facility] ECMO. Admitted for groin hematoma, concern for pseudoaneurysm of R femoral artery, on Dapto/Cefepime. Emergently intubated on 7/29/22 for hypoxemic/hypercapnic respiratory failure. S/p tracheostomy on 8/4/22.    Patient now presents for the above procedure(s).    TTE 8/30/22:   There is an LVAD present. Base speed is 6300 RPMs. The pump type is a Heartmate III. The interventricular septum appears midline. The aortic valve does not open.   The left ventricle is severely enlarged with eccentric hypertrophy and severely decreased systolic function.   The estimated ejection fraction is 10%.   There is left ventricular global hypokinesis.   Left ventricular diastolic dysfunction.   There is trivial continuous aortic regurgitation.   There is abnormal septal wall motion.   The right ventricle is poorly seen, but appears enlarged with reduced systolic function.   Mild tricuspid regurgitation.   The estimated PA systolic pressure is 31 mmHg.   Normal central venous pressure (3 mmHg).   Severe left atrial enlargement.      LDA:       Peripheral IV - Single Lumen 09/24/22 1800 18 G Anterior;Left Forearm (Active)   Site Assessment Clean;Dry;Intact;No redness;No swelling 09/26/22 0730   Extremity Assessment Distal to IV No abnormal discoloration 09/26/22 0301   Line Status Infusing 09/26/22 0730   Dressing Status Clean;Dry;Intact 09/26/22 0730   Dressing Intervention Integrity maintained 09/26/22 0730    Dressing Change Due 09/28/22 09/26/22 0301   Site Change Due 09/28/22 09/25/22 1901   Reason Not Rotated Not due 09/26/22 0301   Number of days: 1            Peripheral IV - Single Lumen 09/24/22 1801 22 G Posterior;Right Forearm (Active)   Site Assessment Clean;Dry;Intact;No redness;No swelling 09/26/22 0730   Extremity Assessment Distal to IV No abnormal discoloration 09/26/22 0301   Line Status Saline locked 09/26/22 0730   Dressing Status Clean;Dry;Intact 09/26/22 0730   Dressing Intervention Integrity maintained 09/26/22 0730   Dressing Change Due 09/28/22 09/26/22 0301   Site Change Due 09/28/22 09/25/22 1901   Reason Not Rotated Not due 09/26/22 0301   Number of days: 1            Peripheral IV - Single Lumen 09/25/22 0900 20 G Anterior;Left;Proximal Forearm (Active)   Site Assessment Clean;Dry;Intact;No redness;No swelling 09/26/22 0730   Extremity Assessment Distal to IV No abnormal discoloration 09/26/22 0301   Line Status Saline locked 09/26/22 0730   Dressing Status Clean;Dry;Intact 09/26/22 0730   Dressing Intervention Integrity maintained 09/26/22 0730   Dressing Change Due 09/29/22 09/26/22 0301   Site Change Due 09/29/22 09/25/22 1901   Reason Not Rotated Not due 09/26/22 0301   Number of days: 0            VAD 07/13/22 1300 Left ventricular assist device HeartMate 3 (Active)   $ Ventricular Assist Device (VAD) Supplies LVAD Kit (30 Day Supply) 08/30/22 2106   Site Location Abdomen left 09/26/22 0730   Site Assessment Other (Comment) 09/26/22 0730   Driveline Exit Site 2 09/25/22 1901   Driveline Assessment Free of Kinks;Intact 09/26/22 0730   Dressing Status Clean;Dry;Intact 09/26/22 0730   Dressing Intervention Integrity maintained 09/26/22 0730   Performed By RN 09/26/22 0730   Dressing Change Schedule Every other day 09/26/22 0730   Dressing Change Due 09/26/22 09/26/22 0730   Driveline [place] in use [place] deng 09/26/22 0730   Condition CDI 09/26/22 0730   Date changed 09/24/22 09/26/22 0730    Number of days: 74       Prev airway:  Date/Time: 7/29/2022 12:33 AM  Performed by: Noah Erickson MD  Authorized by: Kerri Drake MD      Indications:  Hypoxemia and hypercapnia  Diagnosis:  Hypoxic hypercapnic respiratory failure  Patient Location:  ICU  Timeout:  12/29/2022 12:30 AM  Procedure Start Time:  7/29/2022 12:31 AM  Procedure End Time:  7/29/2022 12:33 AM  Staff:     Anesthesiologist Present: Yes    Intubation:     Induction:  Rapid sequence induction    Intubated:  Postinduction    Mask Ventilation:  N/a    Attempts:  1    Attempted By:  Resident anesthesiologist    Blade:  Booth 3    Laryngeal View Grade: Grade I - full view of chords      Difficult Airway Encountered?: No      Complications:  None    Airway Device:  Oral endotracheal tube    Airway Device Size:  8.0    Style/Cuff Inflation:  Cuffed (inflated to minimal occlusive pressure)    Tube secured:  23    Secured at:  The lips    Placement Verified By:  Capnometry    Complicating Factors:  None    Findings Post-Intubation:  BS equal bilateral    Drips: None documented.      Patient Active Problem List   Diagnosis    Cigarette nicotine dependence without complication    Alcohol abuse    Pulmonary nodule seen on imaging study    Chronic combined systolic and diastolic heart failure    Palliative care encounter    Hypoglycemia    Hyperbilirubinemia    Anemia    Hypertension    LVAD (left ventricular assist device) present    Pre-transplant evaluation for heart transplant    GIB (gastrointestinal bleeding)    Thrombocytopenia, unspecified    GI bleed    VT (ventricular tachycardia)    Amiodarone-induced hyperthyroidism    Substance or medication-induced sleep disorder, insomnia type    VF (ventricular fibrillation)    CHICHI (obstructive sleep apnea)    Post traumatic stress disorder (PTSD)    Acute on chronic combined systolic and diastolic congestive heart failure    History of ventricular tachycardia    Protein calorie  malnutrition    Atrial flutter    Adjustment disorder with mixed anxiety and depressed mood    Hyponatremia    Stage 3b chronic kidney disease    Hypertensive cardiovascular-renal disease, stage 1-4 or unspecified chronic kidney disease, with heart failure    High risk medications (not anticoagulants) long-term use    Dilated cardiomyopathy    Anticoagulation monitoring, INR range 2-3    Impaired mobility    Acute on chronic combined systolic and diastolic heart failure    Left ventricular assist device (LVAD) complication, subsequent encounter    History of COVID-19    Groin hematoma    Pseudoaneurysm of femoral artery    Mycotic aneurysm       Review of patient's allergies indicates:   Allergen Reactions    Lisinopril Anaphylaxis    Hydralazine analogues      Chronic constipation, impotence, dizziness       Current Inpatient Medications:   amiodarone  400 mg Oral Daily    ceFEPime (MAXIPIME) IVPB  2 g Intravenous Q8H    DAPTOmycin (CUBICIN) IV (PEDS and ADULTS)  6 mg/kg Intravenous Q24H    levothyroxine  50 mcg Oral Before breakfast    magnesium oxide  400 mg Oral Daily with lunch    magnesium oxide  400 mg Oral Once    mexiletine  250 mg Oral Q8H    mirtazapine  30 mg Oral QHS    pantoprazole  40 mg Oral Daily with lunch    polyethylene glycol  17 g Oral Daily    potassium chloride SA  20 mEq Oral Once       No current facility-administered medications on file prior to encounter.     Current Outpatient Medications on File Prior to Encounter   Medication Sig Dispense Refill    ALPRAZolam (XANAX) 1 MG tablet TAKE 1 TABLET BY MOUTH TWICE A DAILY AS NEEDED FOR ANXIETY. 30 tablet 0    amiodarone (PACERONE) 200 MG Tab Take 2 tablets (400 mg total) by mouth once daily. 180 tablet 3    aspirin (ECOTRIN) 81 MG EC tablet Take 1 tablet (81 mg total) by mouth once daily. 30 tablet 11    doxycycline (VIBRAMYCIN) 100 MG Cap Take 1 capsule (100 mg total) by mouth 2 (two) times daily. for 10 days  20 capsule 0    levothyroxine (SYNTHROID) 50 MCG tablet Take 1 tablet (50 mcg total) by mouth before breakfast. 30 tablet 11    magnesium oxide (MAG-OX) 400 mg (241.3 mg magnesium) tablet Take 1 tablet (400 mg total) by mouth 2 (two) times daily.  0    mexiletine (MEXITIL) 250 MG Cap Take 1 capsule (250 mg total) by mouth every 8 (eight) hours. 90 capsule 3    pantoprazole (PROTONIX) 40 MG tablet Take 1 tablet (40 mg total) by mouth daily with lunch. 90 tablet 3    warfarin (COUMADIN) 5 MG tablet Take 1.5 tablets (7.5 mg total) by mouth Daily. 135 tablet 3    mirtazapine (REMERON) 30 MG tablet Take 1 tablet (30 mg total) by mouth every evening. 30 tablet 11    omega-3 acid ethyl esters (LOVAZA) 1 gram capsule Take 2 capsules (2 g total) by mouth 2 (two) times daily. 360 capsule 3    polyethylene glycol (GLYCOLAX) 17 gram PwPk Take 17 g by mouth once daily.  0    torsemide (DEMADEX) 20 MG Tab TAKE 2 TABLETS BY MOUTH ONCE DAILY (Patient taking differently: daily as needed.) 180 tablet 3    zolpidem (AMBIEN CR) 12.5 MG CR tablet TAKE 1 TABLET (12.5 MG TOTAL) BY MOUTH NIGHTLY AS NEEDED FOR INSOMNIA. (Patient not taking: Reported on 9/22/2022) 15 tablet 0    [DISCONTINUED] ferrous gluconate (FERGON) 324 MG tablet TAKE 1 TABLET (324 MG TOTAL) BY MOUTH WITH LUNCH. 90 tablet 3    [DISCONTINUED] sildenafiL (VIAGRA) 100 MG tablet Take 1 tablet (100 mg total) by mouth daily as needed for Erectile Dysfunction. 6 tablet 3       Past Surgical History:   Procedure Laterality Date    AORTIC VALVULOPLASTY N/A 7/13/2022    Procedure: REPAIR, AORTIC VALVE;  Surgeon: Yg Kaufman MD;  Location: [facility] OR [address] [place] [address];  Service: Cardiovascular;  Laterality: N/A;    APPLICATION OF WOUND VACUUM-ASSISTED CLOSURE DEVICE N/A 7/15/2022    Procedure: APPLICATION, WOUND VAC;  Surgeon: Yg Kaufman MD;  Location: [facility] OR [address] [place] [address];  Service: Cardiovascular;  Laterality: N/A;  50 x 5 cm     CARDIAC CATHETERIZATION  Dec. 2012    CARDIAC  DEFIBRILLATOR PLACEMENT Left     CRRT-D    COLONOSCOPY N/A 3/6/2018    Procedure: COLONOSCOPY;  Surgeon: Alonso Bone MD;  Location: [facility] ENDO (2ND FLR);  Service: Endoscopy;  Laterality: N/A;    COLONOSCOPY N/A 7/17/2019    Procedure: COLONOSCOPY;  Surgeon: Blane Valdez MD;  Location: [facility] ENDO (2ND FLR);  Service: Endoscopy;  Laterality: N/A;    COLONOSCOPY N/A 7/18/2019    Procedure: COLONOSCOPY;  Surgeon: Blane Valdez MD;  Location: [facility] ENDO (2ND FLR);  Service: Endoscopy;  Laterality: N/A;    ESOPHAGOGASTRODUODENOSCOPY N/A 7/17/2019    Procedure: EGD (ESOPHAGOGASTRODUODENOSCOPY);  Surgeon: Blane Valdez MD;  Location: [facility] ENDO (2ND FLR);  Service: Endoscopy;  Laterality: N/A;    ESOPHAGOGASTRODUODENOSCOPY N/A 7/18/2019    Procedure: EGD (ESOPHAGOGASTRODUODENOSCOPY);  Surgeon: Blane Valdez MD;  Location: [facility] ENDO (2ND FLR);  Service: Endoscopy;  Laterality: N/A;    FOREIGN BODY REMOVAL N/A 7/22/2022    Procedure: REMOVAL, FOREIGN BODY;  Surgeon: Yg Kaufman MD;  Location: [facility] OR 2ND FLR;  Service: Cardiovascular;  Laterality: N/A;  LVAD Heartmate 2 drive line removal    INSERTION OF GRAFT TO PERICARDIUM N/A 7/15/2022    Procedure: INSERTION, GRAFT, PERICARDIUM;  Surgeon: Yg Kaufman MD;  Location: [facility] OR 2ND FLR;  Service: Cardiovascular;  Laterality: N/A;    IRRIGATION OF MEDIASTINUM N/A 7/15/2022    Procedure: IRRIGATION, MEDIASTINUM;  Surgeon: Yg Kaufman MD;  Location: [facility] OR 2ND FLR;  Service: Cardiovascular;  Laterality: N/A;    LYSIS OF ADHESIONS  7/13/2022    Procedure: LYSIS, ADHESIONS;  Surgeon: Yg Kaufman MD;  Location: [facility] OR 2ND FLR;  Service: Cardiovascular;;    NONINVASIVE CARDIAC ELECTROPHYSIOLOGY STUDY N/A 10/18/2019    Procedure: CARDIAC ELECTROPHYSIOLOGY STUDY, NONINVASIVE;  Surgeon: Raz Wagner MD;  Location: [facility] EP LAB;  Service: Cardiology;  Laterality: N/A;  VT, DFTs, MDT CRTD in situ, LVAD, LASHELL pizarro, 9096    REPLACEMENT OF IMPLANTABLE  CARDIOVERTER-DEFIBRILLATOR (ICD) GENERATOR N/A 3/9/2020    Procedure: REPLACEMENT, ICD GENERATOR;  Surgeon: Harry Yun MD;  Location: [facility] EP LAB;  Service: Cardiology;  Laterality: N/A;  VT, ICD Gen Change and Lead Revision, MDT, MAC, DM,3 Prep    REPLACEMENT OF LEFT VENTRICULAR ASSIST DEVICE (LVAD)  7/13/2022    Procedure: REPLACEMENT, LVAD;  Surgeon: Yg Kaufman MD;  [facility];  Service: Cardiovascular;;    REPLACEMENT OF PUMP N/A 7/13/2022    Procedure: REPLACEMENT, PUMP;  Surgeon: Yg Kaufman MD;  [facility];  Service: Cardiovascular;  Laterality: N/A;  LVAD pump exchange  EXPLANATION OF HEATMATE 2  IMPLANTATION OF HEARTMATE 3  IMPLANTATION OF 8MM CHIMNEY GRAFT TO RFA  INITIATION OF ECMO  TEMPORARY CLOSURE OF CHEST    REVISION OF IMPLANTABLE CARDIOVERTER-DEFIBRILLATOR (ICD) ELECTRODE LEAD PLACEMENT N/A 3/9/2020    Procedure: REVISION, INSERTION, ELECTRODE LEAD, ICD;  Surgeon: Harry Yun MD;  Location: [facility] EP LAB;  Service: Cardiology;  Laterality: N/A;  VT, ICD Gen Change and Lead Revision, MDT, MAC, DM,3 Prep    STERNAL WOUND CLOSURE N/A 7/14/2022    Procedure: CLOSURE, WOUND, STERNUM;  Surgeon: Yg Kaufman MD;  [facility];  Service: Cardiovascular;  Laterality: N/A;  temporary closure  evacuation of hematoma    STERNAL WOUND CLOSURE N/A 7/15/2022    Procedure: CLOSURE, WOUND, STERNUM;  Surgeon: Yg Kaufman MD;  [facility];  Service: Cardiovascular;  Laterality: N/A;    TRACHEOSTOMY N/A 8/4/2022    Procedure: CREATION, TRACHEOSTOMY;  Surgeon: Germain Holt MD;  Location: [facility] OR [address] [place] [address];  Service: General;  Laterality: N/A;    TREATMENT OF CARDIAC ARRHYTHMIA  10/18/2019    Procedure: Cardioversion or Defibrillation;  Surgeon: Raz Wagner MD;  Location: [facility] EP LAB;  Service: Cardiology;;       Social History     Socioeconomic History    Marital status:     Number of children: 3   Occupational  History     Employer: [employer]    Tobacco Use    Smoking status: Former     Packs/day: 1.00     Years: 31.00     Pack years: 31.00     Types: Cigarettes     Quit date: 2018     Years since quittin.7    Smokeless tobacco: Never    Tobacco comments:     pt is quiting on his own - pt stated not qualified for program;  pt  quit on his own   Substance and Sexual Activity    Alcohol use: No     Alcohol/week: 0.0 standard drinks     Comment: quit    Drug use: No    Sexual activity: Yes     Partners: Female     Birth control/protection: None     Comment: 10/5/17  with same partner 7 years    Social History Narrative    Disabled       OBJECTIVE:     Vital Signs Range (Last 24H):  Temp:  [36.6 °C (97.8 °F)-36.8 °C (98.3 °F)]   Pulse:  []   Resp:  [10-33]   BP: ()/(0-76)   SpO2:  [81 %-99 %]       Significant Labs:  Lab Results   Component Value Date    WBC 6.81 2022    HGB 9.0 (L) 2022    HCT 29.7 (L) 2022     2022    CHOL 130 2022    TRIG 68 2022    HDL 46 2022    ALT 16 2022    AST 28 2022     2022    K 3.8 2022     2022    CREATININE 1.6 (H) 2022    BUN 12 2022    CO2 24 2022    TSH 10.346 (H) 2022    PSA 0.99 2018    INR 2.3 (H) 2022    HGBA1C 5.4 2021       Diagnostic Studies: No relevant studies.    EKG:   Results for orders placed or performed during the hospital encounter of 22   EKG 12-lead    Collection Time: 22  5:40 PM    Narrative    Test Reason : F32.A,    Vent. Rate : 078 BPM     Atrial Rate : 025 BPM     P-R Int : 000 ms          QRS Dur : 156 ms      QT Int : 484 ms       P-R-T Axes : 000 153 150 degrees     QTc Int : 551 ms      Wide QRS rhythm with marked artifact  Right axis deviation  Nonspecific intraventricular block  Right ventricular hypertrophy  Cannot rule out Anterior infarct ,age undetermined  Inferior injury  pattern  Abnormal ECG  When compared with ECG of 14-AUG-2022 11:22,  No major change  Confirmed by Edmond MOSQUEDA MD ([id number]) on 8/15/2022 10:07:21 AM    Referred By: [email or web address]   SELF           Confirmed By:Edmond MOSQUEDA MD       2D ECHO:  TTE:  Results for orders placed or performed during the hospital encounter of 06/27/22   Echo   Result Value Ref Range    Ascending aorta 3.23 cm    STJ 2.87 cm    IVS 0.75 0.6 - 1.1 cm    LA size 4.94 cm    Left Atrium Major Axis 6.63 cm    Left Atrium Minor Axis 6.23 cm    LVIDd 7.44 (A) 3.5 - 6.0 cm    LVIDs 6.94 (A) 2.1 - 4.0 cm    LVOT diameter 2.60 cm    Posterior Wall 0.79 0.6 - 1.1 cm    RA Major Axis 6.10 cm    RA Width 4.45 cm    RVDD 4.30 cm    Sinus 3.25 cm    TAPSE 1.55 cm    TR Max Jorge 2.64 m/s    TDI LATERAL 0.06 m/s    TDI SEPTAL 0.03 m/s    LA WIDTH 4.95 cm    LV Diastolic Volume 292.64 mL    LV Systolic Volume 250.21 mL    RV S' 11.80 cm/s    LA volume (mod) 113.38 cm3    FS 7 %    LA volume 133.52 cm3    LV mass 260.45 g    Left Ventricle Relative Wall Thickness 0.21 cm    Mean e' 0.05 m/s    LVOT area 5.3 cm2    LV Systolic Volume Index 115.3 mL/m2    LV Diastolic Volume Index 134.86 mL/m2    LA Volume Index 61.5 mL/m2    LV Mass Index 120 g/m2    Triscuspid Valve Regurgitation Peak Gradient 28 mmHg    LA Volume Index (Mod) 52.2 mL/m2    BSA 2.18 m2    Right Atrial Pressure (from IVC) 3 mmHg    EF 10 %    TV rest pulmonary artery pressure 31 mmHg    Narrative    · There is an LVAD present. Base speed is 6300 RPMs. The pump type is a   Heartmate III. The interventricular septum appears midline. The aortic   valve does not open.  · The left ventricle is severely enlarged with eccentric hypertrophy and   severely decreased systolic function.  · The estimated ejection fraction is 10%.  · There is left ventricular global hypokinesis.  · Left ventricular diastolic dysfunction.  · There is trivial continuous aortic regurgitation.  · There is abnormal septal wall motion.  · The  right ventricle is poorly seen, but appears enlarged with reduced   systolic function.  · Mild tricuspid regurgitation.  · The estimated PA systolic pressure is 31 mmHg.  · Normal central venous pressure (3 mmHg).  · Severe left atrial enlargement.          BRITTANY:  Results for orders placed or performed during the hospital encounter of 07/01/20   Transesophageal echo (BRITTANY)   Result Value Ref Range    BSA 2.5 m2    Narrative    · 1 cm circumferential pericardial fat pad; unchanged prior to or   following procedure.  · RA, RV and CS leads converge on lateral aspect of SVC prior to removal   during this device extraction.  · HeartMate II in place. AV does not open.  · Severely decreased left ventricular systolic function with global   hypokinesis. The estimated ejection fraction is 15%.  · Sucessfull removal of all endovascular hardware without pericardial   effusion.          ASSESSMENT/PLAN:                                                                                                                  09/26/2022  Tim Richards is a 56 y.o., male.      Pre-op Assessment    I have reviewed the Patient Summary Reports.     I have reviewed the Nursing Notes. I have reviewed the NPO Status.   I have reviewed the Medications.     Review of Systems  Anesthesia Hx:  No problems with previous Anesthesia Denies Hx of Anesthetic complications  History of prior surgery of interest to airway management or planning: heart surgery. Denies Family Hx of Anesthesia complications.   Denies Personal Hx of Anesthesia complications.   Hematology/Oncology:         -- Anemia:   Cardiovascular:   Exercise tolerance: poor Pacemaker Hypertension Dysrhythmias CHF PVD hyperlipidemia    Pulmonary:   Denies COPD.  Denies Asthma. Sleep Apnea    Renal/:   Chronic Renal Disease, CKD    Hepatic/GI:   Denies GERD.    Neurological:   Denies CVA.  Denies Headaches. Denies Seizures.    Endocrine:   Denies Diabetes. Hyperthyroidism  Denies Obesity /  BMI > 30  Psych:   Psychiatric History          Physical Exam  General: Well nourished, Cooperative, Alert and Oriented    Airway:  Mallampati: I / I  Mouth Opening: Normal  TM Distance: Normal  Tongue: Normal  Neck ROM: Normal ROM  Pre-Existing Airway: Tracheal Stoma    Dental:  Intact        Anesthesia Plan  Type of Anesthesia, risks & benefits discussed:    Anesthesia Type: Gen ETT  Intra-op Monitoring Plan: Standard ASA Monitors, Art Line, BRITTANY and Central Line  Post Op Pain Control Plan: multimodal analgesia and IV/PO Opioids PRN  Induction:  IV  Airway Plan: Via Tracheostomy, Post-Induction  Informed Consent: Informed consent signed with the Patient and all parties understand the risks and agree with anesthesia plan.  All questions answered. Patient consented to blood products? Yes  ASA Score: 4  Day of Surgery Review of History & Physical: H&P Update referred to the surgeon/provider.    Ready For Surgery From Anesthesia Perspective.     .

## 2022-09-26 NOTE — SUBJECTIVE & OBJECTIVE
Interval History: No fevers documented overnight.   Wound cx with GNR. Plan for OR tomorrow.     Review of Systems   Constitutional:  Negative for chills and fever.   All other systems reviewed and are negative.  Objective:     Vital Signs (Most Recent):  Temp: 98.4 °F (36.9 °C) (09/26/22 0951)  Pulse: 81 (09/26/22 1005)  Resp: 19 (09/26/22 1005)  BP: (!) 84/61 (09/26/22 1005)  SpO2: 95 % (09/26/22 1005)   Vital Signs (24h Range):  Temp:  [97.8 °F (36.6 °C)-98.5 °F (36.9 °C)] 98.4 °F (36.9 °C)  Pulse:  [] 81  Resp:  [10-33] 19  SpO2:  [81 %-99 %] 95 %  BP: ()/(0-76) 84/61     Weight: 90.2 kg (198 lb 13.7 oz)  Body mass index is 26.24 kg/m².    Estimated Creatinine Clearance: 59 mL/min (A) (based on SCr of 1.6 mg/dL (H)).    Physical Exam  Constitutional:       General: He is not in acute distress.     Appearance: He is not ill-appearing or toxic-appearing.   HENT:      Head: Normocephalic and atraumatic.      Right Ear: External ear normal.      Left Ear: External ear normal.      Mouth/Throat:      Mouth: Mucous membranes are moist.      Pharynx: No oropharyngeal exudate or posterior oropharyngeal erythema.   Eyes:      General: No scleral icterus.        Right eye: No discharge.         Left eye: No discharge.   Neck:      Comments: Trach - minimal drainage present  Cardiovascular:      Comments: VAD  Pulmonary:      Effort: Pulmonary effort is normal. No respiratory distress.   Abdominal:      General: There is no distension.      Palpations: Abdomen is soft.      Tenderness: There is no abdominal tenderness. There is no guarding.      Comments: DLES dressed   Musculoskeletal:         General: No swelling or tenderness.      Right lower leg: No edema.      Left lower leg: No edema.   Skin:     General: Skin is warm and dry.      Findings: No erythema or rash.      Comments: R groin - dressing in place, mildly saturated with bloody fluid   Neurological:      Mental Status: He is alert and oriented to  person, place, and time. Mental status is at baseline.      Motor: No weakness.   Psychiatric:         Mood and Affect: Mood normal.         Behavior: Behavior normal.       Significant Labs:   Microbiology Results (last 7 days)       Procedure Component Value Units Date/Time    Aerobic culture [[id number]]  (Abnormal) Collected: 09/25/22 0815    Order Status: Completed Specimen: Wound from Groin Updated: 09/26/22 0801     Aerobic Bacterial Culture GRAM NEGATIVE LAURA  Few  Identification and susceptibility pending      Culture, Anaerobe [[id number]] Collected: 09/25/22 0815    Order Status: Completed Specimen: Wound from Groin Updated: 09/26/22 0703     Anaerobic Culture Culture in progress    Blood culture [[id number]] Collected: 09/25/22 0133    Order Status: Completed Specimen: Blood from Peripheral, Lower Arm, Left Updated: 09/26/22 0613     Blood Culture, Routine No Growth to date      No Growth to date    Narrative:      Collection has been rescheduled by TR3 at 09/25/2022 00:31 Reason: Pt   dont wanna get stuck rn rachille  Collection has been rescheduled by TR3 at 09/25/2022 00:31 Reason: Pt   dont wanna get stuck rn rachille    Blood culture [[id number]] Collected: 09/25/22 0138    Order Status: Completed Specimen: Blood from Peripheral, Hand, Right Updated: 09/26/22 0613     Blood Culture, Routine No Growth to date      No Growth to date    Narrative:      Collection has been rescheduled by TR3 at 09/25/2022 00:31 Reason: Pt   dont wanna get stuck rn rachille  Collection has been rescheduled by TR3 at 09/25/2022 00:31 Reason: Pt   dont wanna get stuck rn rachille    Fungus culture [[id number]] Collected: 09/25/22 0815    Order Status: Sent Specimen: Wound from Groin Updated: 09/25/22 0918    Blood culture [[id number]]     Order Status: Canceled Specimen: Blood     Blood culture [[id number]]     Order Status: Canceled Specimen: Blood             Significant Imaging: I have reviewed all pertinent imaging  results/findings within the past 24 hours.

## 2022-09-26 NOTE — PROGRESS NOTES
Admit Note      Patient is a 56 y.o.  male, admitted due to bleeding from right groin ecmo site. Pt had LVAD HM3 pump exchange on 7/13/22.     Patient admitted from ED on 9/24/2022 .  At this time, patient presents as alert and oriented x 4, pleasant, good eye contact, calm, communicative, cooperative, and asking and answering questions appropriately, caregiver not present.       Household/Family Systems (as reported by patients caregiver)     Patient resides with patient's spouse, at     [address] [facility] [address]    Support system includes spouse and adult step son.   Patient does not have dependents that are need of being cared for.     Patients primary caregiver is Kelly Richards, patients spouse, phone number [phone].  Confirmed patients contact information is [phone] (home);   Telephone Information:   Mobile [phone]   .    During admission, patient's caregiver plans to stay at home.  Confirmed patient and patients caregivers do have access to reliable transportation.    Cognitive Status/Learning     Patients reports his reading ability as college and states patient does not have difficulty with reading, writing, seeing, hearing, comprehension and learning. Patients reports he learns best by reading.   Needed: No.   Highest education level: college    Vocation/Disability (as reported by patients caregiver)    Working for Income: No  If no, reason not working: Disability  Patient is disabled due to heart issues since 2020.  Prior to disability, patient  was employed as Appremaasing department worker at [employer]..  Pt's spouse works full time.    Adherence     Patients reports a high level of adherence to patients health care regimen.  Adherence counseling and education provided.      Substance Use    Patient reports patients substance usage as the following:    Tobacco: none, patient denies any use.  Alcohol: none, patient denies any  use.  Illicit Drugs/Non-prescribed Medications: none, patient denies any use.  Patients caregiver states clear understanding of the potential impact of substance use.  Substance abstinence/cessation counseling, education and resources provided and reviewed.     Services Utilizing/ADLS (as reported by patients caregiver)    Infusion Service: Prior to admission, patient utilizing? no   Home Health: Prior to admission, patient utilizing? yes, Pt is unsure of the name.   DME: Prior to admission, yes, walker, shower chair, LVAD   Pulmonary/Cardiac Rehab: Prior to admission, no  Dialysis:  Prior to admission, no  Transplant Specialty Pharmacy:  Prior to admission, no.    Prior to admission, patient reports he was not independent with ADLS and was not driving.  Patient reports patient is not able to care for self at this time due to compromised medical condition (as documented in medical record) and physical weakness..  Patient reports patient indicates a willingness to care for self once medically cleared to do so.    Insurance/Medications     Primary Insurance (for UNOS reporting): Private Insurance - BCBS   Secondary Insurance (for UNOS reporting): None     Patient reports he is able to obtain and afford medications at this time and at time of discharge.    Living Will/Healthcare Power of     Patient reports he does not have a LW and/or HCPA.   provided education regarding LW and HCPA and the completion of forms.    Coping/Mental Health (as reported by patients caregiver)    Patient is coping adequately with the aid of  family members. Patients caregiver  denies any mental health concerns at this time. .     Discharge Planning (as reported by patients caregiver)    At time of discharge, patient plans to return to patient's home under the care of his wife (Kelly).  Patients spouse will transport patient.  Per rounds today, expected discharge date has not been medically determined at this time.      Additional Concerns    Patient is being followed for needs, education, resources, information, emotional support, supportive counseling, and for supportive and skilled discharge plan of care.  providing ongoing psychosocial support, education, resources and d/c planning as needed.  SW remains available.  provided resource list, patient choice, psychosocial and supportive counseling, resources, education, assistance and discharge planning with patient and caregiver involvement, ongoing SW availability and services as appropriate.  remains available. Patient denies additional needs and/or concerns at this time. Patient verbalizes understanding and agreement with information reviewed, social work availability, and how to access available resources as needed.

## 2022-09-26 NOTE — PROGRESS NOTES
[facility] - Cardiac Intensive Care  Vascular Surgery  Progress Note    Patient Name: Tim Richards  MRN: [id number]  Admission Date: 9/24/2022  Primary Care Provider: Deyanira Booth MD    Subjective:     Interval History:  No acute overnight events, AF, VSS. Denies pain. Cultures of wound taken, pending. BCx NGTD. INR of 2.3, cont on heparin drip. Hgb 9 today. Plan for rt ileo-fem bypass and rt groin exploration.NPO at midnight. Consent signed and patient marked    Post-Op Info:  Procedure(s) (LRB):  CREATION, BYPASS, ARTERIAL, ILIAC TO FEMORAL (N/A)           Medications:  Continuous Infusions:  Scheduled Meds:   amiodarone  400 mg Oral Daily    ceFEPime (MAXIPIME) IVPB  2 g Intravenous Q8H    DAPTOmycin (CUBICIN) IV (PEDS and ADULTS)  6 mg/kg Intravenous Q24H    levothyroxine  50 mcg Oral Before breakfast    magnesium oxide  400 mg Oral Daily with lunch    mexiletine  250 mg Oral Q8H    mirtazapine  30 mg Oral QHS    pantoprazole  40 mg Oral Daily with lunch    polyethylene glycol  17 g Oral Daily     PRN Meds:sodium chloride, sodium chloride, sodium chloride, sodium chloride, sodium chloride, sodium chloride 0.9%, sodium chloride 0.9%, ALPRAZolam, hydrALAZINE, zolpidem     Objective:     Vital Signs (Most Recent):  Temp: 98.2 °F (36.8 °C) (09/26/22 0301)  Pulse: 86 (09/26/22 0600)  Resp: (!) 22 (09/26/22 0600)  BP: (!) 76/0 (09/26/22 0600)  SpO2: 95 % (09/26/22 0600)   Vital Signs (24h Range):  Temp:  [97.8 °F (36.6 °C)-98.3 °F (36.8 °C)] 98.2 °F (36.8 °C)  Pulse:  [] 86  Resp:  [10-33] 22  SpO2:  [81 %-99 %] 95 %  BP: (76-90)/(0) 76/0         Physical Exam  Constitutional:       Appearance: Normal appearance.   HENT:      Head: Normocephalic and atraumatic.      Mouth/Throat:      Mouth: Mucous membranes are moist.   Eyes:      Pupils: Pupils are equal, round, and reactive to light.   Cardiovascular:      Rate and Rhythm: Regular rhythm.   Abdominal:      General: Abdomen is flat.       Palpations: Abdomen is soft.      Comments: Previous drive line site open healing with granulation tissue, no purulence noted   Musculoskeletal:      Comments: 5/5 strength all 4 extremities, palpable femoral, PT and DP pulses bilaterally. Right groin cutdown site open with granulation tissue and thrombus in wound bed, no active bleeding.    Neurological:      Mental Status: He is alert.       Significant Labs:  CBC:   Recent Labs   Lab 09/26/22  0521   WBC 6.81   RBC 3.32*   HGB 9.0*   HCT 29.7*      MCV 90   MCH 27.1   MCHC 30.3*     CMP:   Recent Labs   Lab 09/26/22  0353   *   CALCIUM 8.3*   ALBUMIN 2.2*   PROT 5.7*      K 3.8   CO2 24      BUN 12   CREATININE 1.6*   ALKPHOS 81   ALT 16   AST 28   BILITOT 0.5       Significant Diagnostics:  I have reviewed all pertinent imaging results/findings within the past 24 hours.    Assessment/Plan:     Pseudoaneurysm of femoral artery  Tim Richards is a 56 y.o. 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 Vascular surgery has been consulted for evaluation of right groin site concerning for pseudoaneurym.     Patient not currently bleeding, hemoglobin is stable, and INR supratherapeutic at 3. History of purulence from right groni wound also complicates decision making of what to do. Given patient's extensive comorbidities and high operative risk, we will plan to treat pseudoaneurysm with endovascular interventions    Recommend:  - Infectious Disease consultation with intention to treat infected wound and 6 covered endovascular stent  -Wound culture sent for anaerobic, aerobic, and fungal  -Broad-spectrum antibiotics  -Will plan for right ileo-femoral bypass and Rt groin exploration tomorrow. Consent has been signed and patient is marked  -Risks, benefits, and alternatives discussed with  the patient, he agrees with plan and wished to proceed  -NPO at midnight  -Strict bedrest, head of bed less than 30°, keep right leg straight  -Blood cultures x2: NGTD  -Hold coumadin, will need to transition to heparin gtt and hold during operation,   -Anticoagulation per primary team  - Pack wound and dress with tegaderm, vascular surgery will change BID  -Allow INR to drift, currently supra therapeutic  -Tranfuse FFP for goal normal INR, goal is INR<1.7. Recheck post transfusion  - Transfuse 1unit PRBC, goal HGB>10. Recheck post transfusion  - Start heparin ggt at level determined by heart failure service  - Plan for ileofemoral  bypass on 9/27, rifampin soaked dacron bypass, Cardiac anesthesia  - Plastics consultation for possible muscle flap coverage.  - Pack wound and dress with tegaderm, vascular surgery will change BID          Jc Sal MD  Vascular Surgery  [facility] - Cardiac Intensive Care

## 2022-09-26 NOTE — PLAN OF CARE
Cardiac ICU Care Plan    POC reviewed with Tim Richards and his wife while at the bedside. Questions and concerns addressed. Pt admitted yesterday for infection and pseudoaneurysm.  Pt being followed by ID and treated with IV ABX as ordered.  Plan for R groin exploration with possible ileo-femoral bypass tomorrow.  Pt received 1 unit FFP and is on his second unit of PRBCs this shift; goal INR <2 and goal Hgb>10.  Pt progressing toward goals.  See below and flowsheets for full assessment and VS info.       Neuro:  Deedee Coma Scale  Best Eye Response: 4-->(E4) spontaneous  Best Motor Response: 6-->(M6) obeys commands  Best Verbal Response: 5-->(V5) oriented  Deedee Coma Scale Score: 15  Assessment Qualifiers: patient not sedated/intubated, no eye obstruction present  Pupil PERRLA: yes    24 hr Temp:  [97.8 °F (36.6 °C)-98.6 °F (37 °C)]      CV:  Rhythm: normal sinus rhythm   DVT prophylaxis: VTE Required Core Measure: Per order contraindicated for SCDs/Anticoagulants    Type: HeartMate3       Pulses  Right Radial Pulse: Doppler  Left Radial Pulse: Doppler  Right Dorsalis Pedis Pulse: Doppler  Left Dorsalis Pedis Pulse: Doppler  Right Posterior Tibial Pulse: Doppler  Left Posterior Tibial Pulse: Doppler    Resp:  O2 Device (Oxygen Therapy): room air       GI/:  GI prophylaxis: Protonix PO daily with lunch  Diet/Nutrition Received: 2 gram sodium, low saturated fat/low cholesterol  Last Bowel Movement: 09/26/22      Intake/Output Summary (Last 24 hours) at 9/26/2022 1837  Last data filed at 9/26/2022 1830  Gross per 24 hour   Intake 2856.56 ml   Output 950 ml   Net 1906.56 ml        Nutritional Supplement Intake: None ordered    Labs/Accuchecks:  Recent Labs   Lab 09/25/22  2253 09/26/22  0521 09/26/22  1702   WBC 5.78 6.81 6.98   RBC 3.04* 3.32* 3.52*   HGB 8.0* 9.0* 9.7*   HCT 28.2* 29.7* 31.6*    252 270      Recent Labs   Lab 09/25/22  0903 09/26/22  0353 09/26/22  1702   INR 2.8* 2.3* 1.5*       Recent Labs     09/26/22  0353      K 3.8   CO2 24      BUN 12   CREATININE 1.6*   ALKPHOS 81   ALT 16   AST 28   BILITOT 0.5       Recent Labs   Lab 09/24/22  1803 09/26/22  0353   CPK  --  57   TROPONINI 0.569*  --     No results for input(s): PH, PCO2, PO2, HCO3, POCSATURATED, BE in the last 72 hours.    Electrolytes: Electrolytes replaced  Accuchecks: none    Gtts/LDAs:      Lines/Drains/Airways       Airway  Duration                  Surgical Airway -- days         Surgical Airway 08/04/22 Shiley Cuffed 53 days              Line  Duration                  VAD 07/13/22 1300 Left ventricular assist device HeartMate 3 75 days              Peripheral Intravenous Line  Duration                  Peripheral IV - Single Lumen 09/24/22 1800 18 G Anterior;Left Forearm 2 days         Peripheral IV - Single Lumen 09/24/22 1801 22 G Posterior;Right Forearm 2 days         Peripheral IV - Single Lumen 09/25/22 0900 20 G Anterior;Left;Proximal Forearm 1 day                    Skin/Wounds  Bathing/Skin Care: bath, complete;back care;dressed/undressed;foot care;incontinence care;linen changed;nail care (Shaved bilateral groin sites) (09/26/22 1705)  Wounds: R groin site (former ECMO insertion site) cleansed with CHG and gauze/tegaderm dressing replaced--plan for procedure tomorrow.  Pressure reduction techniques in use.

## 2022-09-26 NOTE — ASSESSMENT & PLAN NOTE
-S/P S/P DT HM2 3/8/2018 then S/P DT HM3 exchange 7/13/2022 2/2 to thrombosis of HM2 thought caused by COVID PNA  -Current speed 6300  -Last TTE done 8/30/2022 at 6300: LVEDD 7.44, LVEF 10%, RV appears enlarged and decreased, trace AI, trace MR, mild TR, PAS > 28, IVC 3, AV does not open, septum midline  -Coumadin on hold 2/2 active bleeding right groin requiring transfusions and plan for surgical repair tomorrow  -LDH is stable    Procedure: Device Interrogation Including analysis of device parameters  Current Settings: Ventricular Assist Device  Review of device function is stable    TXP LVAD INTERROGATIONS 9/26/2022 9/26/2022 9/26/2022 9/26/2022 9/26/2022 9/26/2022 9/26/2022   Type HeartMate3 HeartMate3 HeartMate3 HeartMate3 HeartMate3 HeartMate3 HeartMate3   Flow 5.4 5.5 5.4 5.4 5.5 5.6 5.4   Speed 6300 6300 6300 6350 6300 6300 6300   PI 2 1.7 2.1 2.3 2.1 1.9 1.8   Power (Jackson) 5.4 5.4 5.3 5.3 5.3 5.2 5.2   LSL - - - - 5900 5900 5900   Low Flow Alarm - - - - - - -   High Power Alarm - - - - - - -   Pulsatility - - - - Intermittent pulse - -

## 2022-09-26 NOTE — ASSESSMENT & PLAN NOTE
-Was on VA-ECMO at last hospitalization with successful decannulation  -CTA revealed 3 possible new pseudoaneurysms  -INR 3 on admission, 2.8 yesterday (got 2.5 mg Vit K po) and 2.3 this morning (got FFP per Vasc Surgery) Holding warfarin at this time with INR goal < 1.7  -Hgb was trending down since admit. Transfusing to keep Hgb > 10  -Cultures from right groin wound and blood done 9/25/with NGTD. Continue empiric Cefepime and Daptomycin (not using Vanc 2/2 recent dye load with CTA)   CTS-pt denies any dyspnea or chest discomfort. Appreciate oral surgery consult. Dental extractions planned for this week. Repeat blood cultures from 5/29 remain negative to date.      Josh Lomeli PA-C

## 2022-09-26 NOTE — ASSESSMENT & PLAN NOTE
-Patient has [facility]win Sci SICD  -Currently on amiodarone 400mg qd and mexiletine 250mg TID, will continue

## 2022-09-27 ENCOUNTER — PATIENT MESSAGE (OUTPATIENT)
Dept: SURGERY | Facility: CLINIC | Age: 57
End: 2022-09-27
Payer: COMMERCIAL

## 2022-09-27 ENCOUNTER — ANESTHESIA (OUTPATIENT)
Dept: SURGERY | Facility: HOSPITAL | Age: 57
DRG: 270 | End: 2022-09-27
Payer: COMMERCIAL

## 2022-09-27 LAB
ABO + RH BLD: NORMAL
ALBUMIN SERPL BCP-MCNC: 2.4 G/DL (ref 3.5–5.2)
ALP SERPL-CCNC: 84 U/L (ref 55–135)
ALT SERPL W/O P-5'-P-CCNC: 19 U/L (ref 10–44)
ANION GAP SERPL CALC-SCNC: 7 MMOL/L (ref 8–16)
AST SERPL-CCNC: 36 U/L (ref 10–40)
BACTERIA SPEC AEROBE CULT: ABNORMAL
BASOPHILS # BLD AUTO: 0.02 K/UL (ref 0–0.2)
BASOPHILS # BLD AUTO: 0.03 K/UL (ref 0–0.2)
BASOPHILS # BLD AUTO: 0.04 K/UL (ref 0–0.2)
BASOPHILS NFR BLD: 0.2 % (ref 0–1.9)
BASOPHILS NFR BLD: 0.4 % (ref 0–1.9)
BASOPHILS NFR BLD: 0.5 % (ref 0–1.9)
BILIRUB DIRECT SERPL-MCNC: 0.3 MG/DL (ref 0.1–0.3)
BILIRUB SERPL-MCNC: 0.6 MG/DL (ref 0.1–1)
BLD GP AB SCN CELLS X3 SERPL QL: NORMAL
BUN SERPL-MCNC: 10 MG/DL (ref 6–20)
CALCIUM SERPL-MCNC: 8.6 MG/DL (ref 8.7–10.5)
CHLORIDE SERPL-SCNC: 105 MMOL/L (ref 95–110)
CO2 SERPL-SCNC: 23 MMOL/L (ref 23–29)
CREAT SERPL-MCNC: 1.4 MG/DL (ref 0.5–1.4)
DIFFERENTIAL METHOD: ABNORMAL
EOSINOPHIL # BLD AUTO: 0 K/UL (ref 0–0.5)
EOSINOPHIL # BLD AUTO: 0.3 K/UL (ref 0–0.5)
EOSINOPHIL # BLD AUTO: 0.4 K/UL (ref 0–0.5)
EOSINOPHIL NFR BLD: 0 % (ref 0–8)
EOSINOPHIL NFR BLD: 4.4 % (ref 0–8)
EOSINOPHIL NFR BLD: 5.4 % (ref 0–8)
ERYTHROCYTE [DISTWIDTH] IN BLOOD BY AUTOMATED COUNT: 14.8 % (ref 11.5–14.5)
ERYTHROCYTE [DISTWIDTH] IN BLOOD BY AUTOMATED COUNT: 14.9 % (ref 11.5–14.5)
ERYTHROCYTE [DISTWIDTH] IN BLOOD BY AUTOMATED COUNT: 15 % (ref 11.5–14.5)
EST. GFR  (NO RACE VARIABLE): 59.4 ML/MIN/1.73 M^2
GLUCOSE SERPL-MCNC: 69 MG/DL (ref 70–110)
GRAM STN SPEC: NORMAL
HCT VFR BLD AUTO: 31.7 % (ref 40–54)
HCT VFR BLD AUTO: 33.2 % (ref 40–54)
HCT VFR BLD AUTO: 35.2 % (ref 40–54)
HGB BLD-MCNC: 10 G/DL (ref 14–18)
HGB BLD-MCNC: 10.1 G/DL (ref 14–18)
HGB BLD-MCNC: 10.7 G/DL (ref 14–18)
IMM GRANULOCYTES # BLD AUTO: 0.04 K/UL (ref 0–0.04)
IMM GRANULOCYTES # BLD AUTO: 0.04 K/UL (ref 0–0.04)
IMM GRANULOCYTES # BLD AUTO: 0.11 K/UL (ref 0–0.04)
IMM GRANULOCYTES NFR BLD AUTO: 0.5 % (ref 0–0.5)
IMM GRANULOCYTES NFR BLD AUTO: 0.6 % (ref 0–0.5)
IMM GRANULOCYTES NFR BLD AUTO: 0.9 % (ref 0–0.5)
INR PPP: 1.3 (ref 0.8–1.2)
LDH SERPL L TO P-CCNC: 424 U/L (ref 110–260)
LYMPHOCYTES # BLD AUTO: 0.3 K/UL (ref 1–4.8)
LYMPHOCYTES # BLD AUTO: 1.1 K/UL (ref 1–4.8)
LYMPHOCYTES # BLD AUTO: 1.4 K/UL (ref 1–4.8)
LYMPHOCYTES NFR BLD: 15.4 % (ref 18–48)
LYMPHOCYTES NFR BLD: 18.2 % (ref 18–48)
LYMPHOCYTES NFR BLD: 2.6 % (ref 18–48)
MAGNESIUM SERPL-MCNC: 1.8 MG/DL (ref 1.6–2.6)
MCH RBC QN AUTO: 27.2 PG (ref 27–31)
MCH RBC QN AUTO: 27.2 PG (ref 27–31)
MCH RBC QN AUTO: 27.9 PG (ref 27–31)
MCHC RBC AUTO-ENTMCNC: 30.4 G/DL (ref 32–36)
MCHC RBC AUTO-ENTMCNC: 30.4 G/DL (ref 32–36)
MCHC RBC AUTO-ENTMCNC: 31.5 G/DL (ref 32–36)
MCV RBC AUTO: 88 FL (ref 82–98)
MCV RBC AUTO: 90 FL (ref 82–98)
MCV RBC AUTO: 90 FL (ref 82–98)
MONOCYTES # BLD AUTO: 0.2 K/UL (ref 0.3–1)
MONOCYTES # BLD AUTO: 0.9 K/UL (ref 0.3–1)
MONOCYTES # BLD AUTO: 1 K/UL (ref 0.3–1)
MONOCYTES NFR BLD: 1.5 % (ref 4–15)
MONOCYTES NFR BLD: 13.3 % (ref 4–15)
MONOCYTES NFR BLD: 13.5 % (ref 4–15)
NEUTROPHILS # BLD AUTO: 11.6 K/UL (ref 1.8–7.7)
NEUTROPHILS # BLD AUTO: 4.6 K/UL (ref 1.8–7.7)
NEUTROPHILS # BLD AUTO: 4.7 K/UL (ref 1.8–7.7)
NEUTROPHILS NFR BLD: 61.9 % (ref 38–73)
NEUTROPHILS NFR BLD: 65.9 % (ref 38–73)
NEUTROPHILS NFR BLD: 94.8 % (ref 38–73)
NRBC BLD-RTO: 0 /100 WBC
PLATELET # BLD AUTO: 221 K/UL (ref 150–450)
PLATELET # BLD AUTO: 246 K/UL (ref 150–450)
PLATELET # BLD AUTO: 265 K/UL (ref 150–450)
PMV BLD AUTO: 10 FL (ref 9.2–12.9)
PMV BLD AUTO: 9.6 FL (ref 9.2–12.9)
PMV BLD AUTO: 9.7 FL (ref 9.2–12.9)
POTASSIUM SERPL-SCNC: 4 MMOL/L (ref 3.5–5.1)
PROT SERPL-MCNC: 6.1 G/DL (ref 6–8.4)
PROTHROMBIN TIME: 13.5 SEC (ref 9–12.5)
RBC # BLD AUTO: 3.59 M/UL (ref 4.6–6.2)
RBC # BLD AUTO: 3.71 M/UL (ref 4.6–6.2)
RBC # BLD AUTO: 3.93 M/UL (ref 4.6–6.2)
SODIUM SERPL-SCNC: 135 MMOL/L (ref 136–145)
WBC # BLD AUTO: 12.2 K/UL (ref 3.9–12.7)
WBC # BLD AUTO: 7.06 K/UL (ref 3.9–12.7)
WBC # BLD AUTO: 7.42 K/UL (ref 3.9–12.7)

## 2022-09-27 PROCEDURE — 87070 CULTURE OTHR SPECIMN AEROBIC: CPT | Mod: 59 | Performed by: SURGERY

## 2022-09-27 PROCEDURE — 36556 INSERT NON-TUNNEL CV CATH: CPT | Mod: 59,,, | Performed by: STUDENT IN AN ORGANIZED HEALTH CARE EDUCATION/TRAINING PROGRAM

## 2022-09-27 PROCEDURE — 36556 CENTRAL LINE: ICD-10-PCS | Mod: 59,,, | Performed by: STUDENT IN AN ORGANIZED HEALTH CARE EDUCATION/TRAINING PROGRAM

## 2022-09-27 PROCEDURE — 99291 CRITICAL CARE FIRST HOUR: CPT | Mod: ,,, | Performed by: PHYSICIAN ASSISTANT

## 2022-09-27 PROCEDURE — 25000003 PHARM REV CODE 250: Performed by: HOSPITALIST

## 2022-09-27 PROCEDURE — 87205 SMEAR GRAM STAIN: CPT | Mod: 59 | Performed by: SURGERY

## 2022-09-27 PROCEDURE — 83615 LACTATE (LD) (LDH) ENZYME: CPT | Performed by: NURSE PRACTITIONER

## 2022-09-27 PROCEDURE — 99900035 HC TECH TIME PER 15 MIN (STAT)

## 2022-09-27 PROCEDURE — 80048 BASIC METABOLIC PNL TOTAL CA: CPT | Performed by: NURSE PRACTITIONER

## 2022-09-27 PROCEDURE — 80076 HEPATIC FUNCTION PANEL: CPT | Performed by: NURSE PRACTITIONER

## 2022-09-27 PROCEDURE — 27000207 HC ISOLATION

## 2022-09-27 PROCEDURE — 25000003 PHARM REV CODE 250: Performed by: STUDENT IN AN ORGANIZED HEALTH CARE EDUCATION/TRAINING PROGRAM

## 2022-09-27 PROCEDURE — 15738 MUSCLE-SKIN GRAFT LEG: CPT | Mod: ,,, | Performed by: SURGERY

## 2022-09-27 PROCEDURE — D9220A PRA ANESTHESIA: Mod: ,,, | Performed by: STUDENT IN AN ORGANIZED HEALTH CARE EDUCATION/TRAINING PROGRAM

## 2022-09-27 PROCEDURE — 87075 CULTR BACTERIA EXCEPT BLOOD: CPT | Mod: 59 | Performed by: SURGERY

## 2022-09-27 PROCEDURE — 37000009 HC ANESTHESIA EA ADD 15 MINS: Performed by: SURGERY

## 2022-09-27 PROCEDURE — 63600175 PHARM REV CODE 636 W HCPCS: Performed by: HOSPITALIST

## 2022-09-27 PROCEDURE — 36000709 HC OR TIME LEV III EA ADD 15 MIN: Performed by: SURGERY

## 2022-09-27 PROCEDURE — 87116 MYCOBACTERIA CULTURE: CPT | Mod: 59 | Performed by: SURGERY

## 2022-09-27 PROCEDURE — D9220A PRA ANESTHESIA: ICD-10-PCS | Mod: ,,, | Performed by: STUDENT IN AN ORGANIZED HEALTH CARE EDUCATION/TRAINING PROGRAM

## 2022-09-27 PROCEDURE — 99291 PR CRITICAL CARE, E/M 30-74 MINUTES: ICD-10-PCS | Mod: ,,, | Performed by: PHYSICIAN ASSISTANT

## 2022-09-27 PROCEDURE — 93750 PR INTERROGATE VENT ASSIST DEV, IN PERSON, W PHYSICIAN ANALYSIS: ICD-10-PCS | Mod: ,,, | Performed by: INTERNAL MEDICINE

## 2022-09-27 PROCEDURE — 35903 EXCISION GRAFT EXTREMITY: CPT | Mod: 51,,, | Performed by: SURGERY

## 2022-09-27 PROCEDURE — 63600175 PHARM REV CODE 636 W HCPCS: Performed by: PHYSICIAN ASSISTANT

## 2022-09-27 PROCEDURE — 86850 RBC ANTIBODY SCREEN: CPT | Performed by: INTERNAL MEDICINE

## 2022-09-27 PROCEDURE — 36620 ARTERIAL: ICD-10-PCS | Mod: 59,,, | Performed by: STUDENT IN AN ORGANIZED HEALTH CARE EDUCATION/TRAINING PROGRAM

## 2022-09-27 PROCEDURE — C1729 CATH, DRAINAGE: HCPCS | Performed by: SURGERY

## 2022-09-27 PROCEDURE — C1768 GRAFT, VASCULAR: HCPCS | Performed by: SURGERY

## 2022-09-27 PROCEDURE — 83735 ASSAY OF MAGNESIUM: CPT | Performed by: NURSE PRACTITIONER

## 2022-09-27 PROCEDURE — 25000003 PHARM REV CODE 250: Performed by: PHYSICIAN ASSISTANT

## 2022-09-27 PROCEDURE — 63600175 PHARM REV CODE 636 W HCPCS: Performed by: INTERNAL MEDICINE

## 2022-09-27 PROCEDURE — 27000191 HC C-V MONITORING

## 2022-09-27 PROCEDURE — 20000000 HC ICU ROOM

## 2022-09-27 PROCEDURE — 85025 COMPLETE CBC W/AUTO DIFF WBC: CPT | Performed by: INTERNAL MEDICINE

## 2022-09-27 PROCEDURE — 35665 BPG ILIOFEMORAL: CPT | Mod: LT,,, | Performed by: SURGERY

## 2022-09-27 PROCEDURE — 15738 PR MUSCLE-SKIN FLAP,LEG: ICD-10-PCS | Mod: ,,, | Performed by: SURGERY

## 2022-09-27 PROCEDURE — 87102 FUNGUS ISOLATION CULTURE: CPT | Mod: 59 | Performed by: SURGERY

## 2022-09-27 PROCEDURE — 94761 N-INVAS EAR/PLS OXIMETRY MLT: CPT

## 2022-09-27 PROCEDURE — 25000003 PHARM REV CODE 250: Performed by: INTERNAL MEDICINE

## 2022-09-27 PROCEDURE — 87206 SMEAR FLUORESCENT/ACID STAI: CPT | Performed by: SURGERY

## 2022-09-27 PROCEDURE — 93750 INTERROGATION VAD IN PERSON: CPT | Mod: ,,, | Performed by: INTERNAL MEDICINE

## 2022-09-27 PROCEDURE — 63600175 PHARM REV CODE 636 W HCPCS: Performed by: STUDENT IN AN ORGANIZED HEALTH CARE EDUCATION/TRAINING PROGRAM

## 2022-09-27 PROCEDURE — 36620 INSERTION CATHETER ARTERY: CPT | Mod: 59,,, | Performed by: STUDENT IN AN ORGANIZED HEALTH CARE EDUCATION/TRAINING PROGRAM

## 2022-09-27 PROCEDURE — 27000248 HC VAD-ADDITIONAL DAY

## 2022-09-27 PROCEDURE — 25000003 PHARM REV CODE 250: Performed by: NURSE PRACTITIONER

## 2022-09-27 PROCEDURE — 27201041 HC RESERVOIR, CARDIOTOMY

## 2022-09-27 PROCEDURE — 85025 COMPLETE CBC W/AUTO DIFF WBC: CPT | Mod: 91 | Performed by: NURSE PRACTITIONER

## 2022-09-27 PROCEDURE — 85610 PROTHROMBIN TIME: CPT | Performed by: STUDENT IN AN ORGANIZED HEALTH CARE EDUCATION/TRAINING PROGRAM

## 2022-09-27 PROCEDURE — 87176 TISSUE HOMOGENIZATION CULTR: CPT | Performed by: SURGERY

## 2022-09-27 PROCEDURE — 25000003 PHARM REV CODE 250: Performed by: SURGERY

## 2022-09-27 PROCEDURE — 35665 PR BYPASS GRAFT OTHR,ILIOFEMORAL: ICD-10-PCS | Mod: LT,,, | Performed by: SURGERY

## 2022-09-27 PROCEDURE — 87186 SC STD MICRODIL/AGAR DIL: CPT | Mod: 59 | Performed by: SURGERY

## 2022-09-27 PROCEDURE — 63600175 PHARM REV CODE 636 W HCPCS: Performed by: SURGERY

## 2022-09-27 PROCEDURE — 35903 PR EXCISION, INFEC GRAFT, EXTREMITY: ICD-10-PCS | Mod: 51,,, | Performed by: SURGERY

## 2022-09-27 PROCEDURE — 27201423 OPTIME MED/SURG SUP & DEVICES STERILE SUPPLY: Performed by: SURGERY

## 2022-09-27 PROCEDURE — 27201037 HC PRESSURE MONITORING SET UP

## 2022-09-27 PROCEDURE — 37000008 HC ANESTHESIA 1ST 15 MINUTES: Performed by: SURGERY

## 2022-09-27 PROCEDURE — 87077 CULTURE AEROBIC IDENTIFY: CPT | Performed by: SURGERY

## 2022-09-27 PROCEDURE — 87107 FUNGI IDENTIFICATION MOLD: CPT | Performed by: SURGERY

## 2022-09-27 PROCEDURE — 36000708 HC OR TIME LEV III 1ST 15 MIN: Performed by: SURGERY

## 2022-09-27 DEVICE — GRAFT 8MM X 300CM: Type: IMPLANTABLE DEVICE | Site: LEG | Status: FUNCTIONAL

## 2022-09-27 RX ORDER — PROPOFOL 10 MG/ML
VIAL (ML) INTRAVENOUS
Status: DISCONTINUED | OUTPATIENT
Start: 2022-09-27 | End: 2022-09-27

## 2022-09-27 RX ORDER — NOREPINEPHRINE BITARTRATE/D5W 4MG/250ML
0-3 PLASTIC BAG, INJECTION (ML) INTRAVENOUS CONTINUOUS
Status: DISCONTINUED | OUTPATIENT
Start: 2022-09-27 | End: 2022-09-28

## 2022-09-27 RX ORDER — HEPARIN SODIUM 1000 [USP'U]/ML
INJECTION, SOLUTION INTRAVENOUS; SUBCUTANEOUS
Status: DISCONTINUED | OUTPATIENT
Start: 2022-09-27 | End: 2022-09-27 | Stop reason: HOSPADM

## 2022-09-27 RX ORDER — DEXAMETHASONE SODIUM PHOSPHATE 4 MG/ML
INJECTION, SOLUTION INTRA-ARTICULAR; INTRALESIONAL; INTRAMUSCULAR; INTRAVENOUS; SOFT TISSUE
Status: DISCONTINUED | OUTPATIENT
Start: 2022-09-27 | End: 2022-09-27

## 2022-09-27 RX ORDER — OXYCODONE AND ACETAMINOPHEN 10; 325 MG/1; MG/1
1 TABLET ORAL EVERY 6 HOURS PRN
Status: DISCONTINUED | OUTPATIENT
Start: 2022-09-27 | End: 2022-09-28

## 2022-09-27 RX ORDER — MAGNESIUM SULFATE 1 G/100ML
1 INJECTION INTRAVENOUS ONCE
Status: COMPLETED | OUTPATIENT
Start: 2022-09-27 | End: 2022-09-27

## 2022-09-27 RX ORDER — PHENYLEPHRINE HYDROCHLORIDE 10 MG/ML
INJECTION INTRAVENOUS
Status: DISCONTINUED | OUTPATIENT
Start: 2022-09-27 | End: 2022-09-27

## 2022-09-27 RX ORDER — FENTANYL CITRATE 50 UG/ML
INJECTION, SOLUTION INTRAMUSCULAR; INTRAVENOUS
Status: DISCONTINUED | OUTPATIENT
Start: 2022-09-27 | End: 2022-09-27

## 2022-09-27 RX ORDER — LIDOCAINE HYDROCHLORIDE 20 MG/ML
INJECTION, SOLUTION EPIDURAL; INFILTRATION; INTRACAUDAL; PERINEURAL
Status: DISCONTINUED | OUTPATIENT
Start: 2022-09-27 | End: 2022-09-27

## 2022-09-27 RX ORDER — HYDROMORPHONE HYDROCHLORIDE 1 MG/ML
0.5 INJECTION, SOLUTION INTRAMUSCULAR; INTRAVENOUS; SUBCUTANEOUS ONCE
Status: COMPLETED | OUTPATIENT
Start: 2022-09-27 | End: 2022-09-27

## 2022-09-27 RX ORDER — EPINEPHRINE 0.1 MG/ML
INJECTION INTRAVENOUS
Status: DISCONTINUED | OUTPATIENT
Start: 2022-09-27 | End: 2022-09-27

## 2022-09-27 RX ORDER — HEPARIN SODIUM 1000 [USP'U]/ML
INJECTION, SOLUTION INTRAVENOUS; SUBCUTANEOUS
Status: DISCONTINUED | OUTPATIENT
Start: 2022-09-27 | End: 2022-09-27

## 2022-09-27 RX ORDER — PROTAMINE SULFATE 10 MG/ML
INJECTION, SOLUTION INTRAVENOUS
Status: DISCONTINUED | OUTPATIENT
Start: 2022-09-27 | End: 2022-09-27

## 2022-09-27 RX ORDER — MIDAZOLAM HYDROCHLORIDE 1 MG/ML
INJECTION INTRAMUSCULAR; INTRAVENOUS
Status: DISCONTINUED | OUTPATIENT
Start: 2022-09-27 | End: 2022-09-27

## 2022-09-27 RX ORDER — HYDRALAZINE HYDROCHLORIDE 25 MG/1
25 TABLET, FILM COATED ORAL ONCE
Status: DISCONTINUED | OUTPATIENT
Start: 2022-09-27 | End: 2022-09-27

## 2022-09-27 RX ORDER — VASOPRESSIN 20 [USP'U]/ML
INJECTION, SOLUTION INTRAMUSCULAR; SUBCUTANEOUS
Status: DISCONTINUED | OUTPATIENT
Start: 2022-09-27 | End: 2022-09-27

## 2022-09-27 RX ORDER — KETAMINE HCL IN 0.9 % NACL 50 MG/5 ML
SYRINGE (ML) INTRAVENOUS
Status: DISCONTINUED | OUTPATIENT
Start: 2022-09-27 | End: 2022-09-27

## 2022-09-27 RX ORDER — ONDANSETRON 2 MG/ML
INJECTION INTRAMUSCULAR; INTRAVENOUS
Status: DISCONTINUED | OUTPATIENT
Start: 2022-09-27 | End: 2022-09-27

## 2022-09-27 RX ORDER — ROCURONIUM BROMIDE 10 MG/ML
INJECTION, SOLUTION INTRAVENOUS
Status: DISCONTINUED | OUTPATIENT
Start: 2022-09-27 | End: 2022-09-27

## 2022-09-27 RX ORDER — ACETAMINOPHEN 10 MG/ML
INJECTION, SOLUTION INTRAVENOUS
Status: DISCONTINUED | OUTPATIENT
Start: 2022-09-27 | End: 2022-09-27

## 2022-09-27 RX ORDER — RIFAMPIN 600 MG/10ML
INJECTION, POWDER, LYOPHILIZED, FOR SOLUTION INTRAVENOUS
Status: DISCONTINUED | OUTPATIENT
Start: 2022-09-27 | End: 2022-09-27 | Stop reason: HOSPADM

## 2022-09-27 RX ORDER — RIFAMPIN 600 MG/10ML
600 INJECTION, POWDER, LYOPHILIZED, FOR SOLUTION INTRAVENOUS ONCE
Status: DISCONTINUED | OUTPATIENT
Start: 2022-09-27 | End: 2022-09-27

## 2022-09-27 RX ORDER — DOXAZOSIN 2 MG/1
2 TABLET ORAL ONCE
Status: COMPLETED | OUTPATIENT
Start: 2022-09-27 | End: 2022-09-27

## 2022-09-27 RX ADMIN — AMIODARONE HYDROCHLORIDE 400 MG: 200 TABLET ORAL at 01:09

## 2022-09-27 RX ADMIN — EPINEPHRINE 10 MCG: 0.1 INJECTION INTRACARDIAC; INTRAVENOUS at 07:09

## 2022-09-27 RX ADMIN — HEPARIN SODIUM 10000 UNITS: 1000 INJECTION, SOLUTION INTRAVENOUS; SUBCUTANEOUS at 08:09

## 2022-09-27 RX ADMIN — MEROPENEM 2 G: 1 INJECTION INTRAVENOUS at 08:09

## 2022-09-27 RX ADMIN — VASOPRESSIN 1 UNITS: 20 INJECTION INTRAVENOUS at 08:09

## 2022-09-27 RX ADMIN — VASOPRESSIN 1 UNITS: 20 INJECTION INTRAVENOUS at 07:09

## 2022-09-27 RX ADMIN — HYDROMORPHONE HYDROCHLORIDE 0.5 MG: 1 INJECTION, SOLUTION INTRAMUSCULAR; INTRAVENOUS; SUBCUTANEOUS at 01:09

## 2022-09-27 RX ADMIN — ROCURONIUM BROMIDE 60 MG: 10 INJECTION INTRAVENOUS at 07:09

## 2022-09-27 RX ADMIN — VASOPRESSIN 2 UNITS: 20 INJECTION INTRAVENOUS at 08:09

## 2022-09-27 RX ADMIN — PHENYLEPHRINE HYDROCHLORIDE 100 MCG: 10 INJECTION INTRAVENOUS at 07:09

## 2022-09-27 RX ADMIN — MEXILETINE HYDROCHLORIDE 250 MG: 250 CAPSULE ORAL at 03:09

## 2022-09-27 RX ADMIN — NOREPINEPHRINE BITARTRATE 0.02 MCG/KG/MIN: 4 INJECTION, SOLUTION INTRAVENOUS at 01:09

## 2022-09-27 RX ADMIN — FENTANYL CITRATE 50 MCG: 50 INJECTION, SOLUTION INTRAMUSCULAR; INTRAVENOUS at 07:09

## 2022-09-27 RX ADMIN — MEXILETINE HYDROCHLORIDE 250 MG: 250 CAPSULE ORAL at 09:09

## 2022-09-27 RX ADMIN — MEXILETINE HYDROCHLORIDE 250 MG: 250 CAPSULE ORAL at 05:09

## 2022-09-27 RX ADMIN — MAGNESIUM SULFATE HEPTAHYDRATE 1 G: 500 INJECTION, SOLUTION INTRAMUSCULAR; INTRAVENOUS at 05:09

## 2022-09-27 RX ADMIN — VASOPRESSIN 1 UNITS: 20 INJECTION INTRAVENOUS at 09:09

## 2022-09-27 RX ADMIN — LEVOTHYROXINE SODIUM 50 MCG: 50 TABLET ORAL at 05:09

## 2022-09-27 RX ADMIN — ROCURONIUM BROMIDE 40 MG: 10 INJECTION INTRAVENOUS at 07:09

## 2022-09-27 RX ADMIN — SODIUM CHLORIDE, SODIUM GLUCONATE, SODIUM ACETATE, POTASSIUM CHLORIDE, MAGNESIUM CHLORIDE, SODIUM PHOSPHATE, DIBASIC, AND POTASSIUM PHOSPHATE: .53; .5; .37; .037; .03; .012; .00082 INJECTION, SOLUTION INTRAVENOUS at 07:09

## 2022-09-27 RX ADMIN — CEFEPIME 2 G: 2 INJECTION, POWDER, FOR SOLUTION INTRAVENOUS at 06:09

## 2022-09-27 RX ADMIN — DOXAZOSIN 2 MG: 2 TABLET ORAL at 02:09

## 2022-09-27 RX ADMIN — MEROPENEM 2 G: 1 INJECTION INTRAVENOUS at 01:09

## 2022-09-27 RX ADMIN — OXYCODONE HYDROCHLORIDE AND ACETAMINOPHEN 1 TABLET: 10; 325 TABLET ORAL at 10:09

## 2022-09-27 RX ADMIN — ACETAMINOPHEN 1000 MG: 10 INJECTION INTRAVENOUS at 09:09

## 2022-09-27 RX ADMIN — PROTAMINE SULFATE 5 MG: 10 INJECTION, SOLUTION INTRAVENOUS at 10:09

## 2022-09-27 RX ADMIN — MIDAZOLAM HYDROCHLORIDE 2 MG: 1 INJECTION, SOLUTION INTRAMUSCULAR; INTRAVENOUS at 07:09

## 2022-09-27 RX ADMIN — CALCIUM CHLORIDE 400 MG: 100 INJECTION, SOLUTION INTRAVENOUS at 07:09

## 2022-09-27 RX ADMIN — PHENYLEPHRINE HYDROCHLORIDE 100 MCG: 10 INJECTION INTRAVENOUS at 10:09

## 2022-09-27 RX ADMIN — DAPTOMYCIN 540 MG: 350 INJECTION, POWDER, LYOPHILIZED, FOR SOLUTION INTRAVENOUS at 03:09

## 2022-09-27 RX ADMIN — PROPOFOL 20 MG: 10 INJECTION, EMULSION INTRAVENOUS at 11:09

## 2022-09-27 RX ADMIN — VASOPRESSIN 2 UNITS: 20 INJECTION INTRAVENOUS at 07:09

## 2022-09-27 RX ADMIN — PROTAMINE SULFATE 10 MG: 10 INJECTION, SOLUTION INTRAVENOUS at 10:09

## 2022-09-27 RX ADMIN — NOREPINEPHRINE BITARTRATE 0.02 MCG/KG/MIN: 1 INJECTION, SOLUTION, CONCENTRATE INTRAVENOUS at 07:09

## 2022-09-27 RX ADMIN — ALPRAZOLAM 1 MG: 0.5 TABLET ORAL at 08:09

## 2022-09-27 RX ADMIN — VASOPRESSIN 0.04 UNITS/MIN: 20 INJECTION INTRAVENOUS at 07:09

## 2022-09-27 RX ADMIN — SODIUM CHLORIDE: 9 INJECTION, SOLUTION INTRAVENOUS at 07:09

## 2022-09-27 RX ADMIN — EPINEPHRINE 0.06 MCG/KG/MIN: 1 INJECTION INTRAMUSCULAR; INTRAVENOUS; SUBCUTANEOUS at 07:09

## 2022-09-27 RX ADMIN — PROTAMINE SULFATE 5 MG: 10 INJECTION, SOLUTION INTRAVENOUS at 09:09

## 2022-09-27 RX ADMIN — LIDOCAINE HYDROCHLORIDE 100 MG: 20 INJECTION, SOLUTION EPIDURAL; INFILTRATION; INTRACAUDAL; PERINEURAL at 07:09

## 2022-09-27 RX ADMIN — FENTANYL CITRATE 25 MCG: 50 INJECTION, SOLUTION INTRAMUSCULAR; INTRAVENOUS at 11:09

## 2022-09-27 RX ADMIN — HEPARIN SODIUM 1000 UNITS: 1000 INJECTION, SOLUTION INTRAVENOUS; SUBCUTANEOUS at 09:09

## 2022-09-27 RX ADMIN — MIRTAZAPINE 30 MG: 30 TABLET, FILM COATED ORAL at 08:09

## 2022-09-27 RX ADMIN — VASOPRESSIN 2 UNITS: 20 INJECTION INTRAVENOUS at 09:09

## 2022-09-27 RX ADMIN — SODIUM CHLORIDE, SODIUM GLUCONATE, SODIUM ACETATE, POTASSIUM CHLORIDE, MAGNESIUM CHLORIDE, SODIUM PHOSPHATE, DIBASIC, AND POTASSIUM PHOSPHATE: .53; .5; .37; .037; .03; .012; .00082 INJECTION, SOLUTION INTRAVENOUS at 06:09

## 2022-09-27 RX ADMIN — Medication 400 MG: at 01:09

## 2022-09-27 RX ADMIN — DEXAMETHASONE SODIUM PHOSPHATE 8 MG: 4 INJECTION, SOLUTION INTRAMUSCULAR; INTRAVENOUS at 07:09

## 2022-09-27 RX ADMIN — Medication 50 MG: at 07:09

## 2022-09-27 RX ADMIN — ONDANSETRON 4 MG: 2 INJECTION INTRAMUSCULAR; INTRAVENOUS at 07:09

## 2022-09-27 RX ADMIN — PROPOFOL 20 MG: 10 INJECTION, EMULSION INTRAVENOUS at 07:09

## 2022-09-27 RX ADMIN — ROCURONIUM BROMIDE 10 MG: 10 INJECTION INTRAVENOUS at 10:09

## 2022-09-27 RX ADMIN — SUGAMMADEX 200 MG: 100 INJECTION, SOLUTION INTRAVENOUS at 11:09

## 2022-09-27 RX ADMIN — OXYCODONE HYDROCHLORIDE AND ACETAMINOPHEN 1 TABLET: 10; 325 TABLET ORAL at 03:09

## 2022-09-27 RX ADMIN — PROTAMINE SULFATE 55 MG: 10 INJECTION, SOLUTION INTRAVENOUS at 10:09

## 2022-09-27 RX ADMIN — PANTOPRAZOLE SODIUM 40 MG: 40 TABLET, DELAYED RELEASE ORAL at 01:09

## 2022-09-27 NOTE — NURSING
Cuff MAPs . Doppler BP of 88. Low urinary output. Dr. Jackson updated. Doxazosin one time dose ordered and given.

## 2022-09-27 NOTE — SUBJECTIVE & OBJECTIVE
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 Patient in pain but is stable recovering in CICU. Just on .02 levo for BP support. ID following for + wound cultures that are now growing ESBL E Coli.    Continuous Infusions:   NORepinephrine bitartrate-D5W 0.02 mcg/kg/min (09/27/22 1401)     Scheduled Meds:   amiodarone  400 mg Oral Daily    DAPTOmycin (CUBICIN) IV (PEDS and ADULTS)  6 mg/kg Intravenous Q24H    levothyroxine  50 mcg Oral Before breakfast    magnesium oxide  400 mg Oral Daily with lunch    meropenem (MERREM) IVPB  2 g Intravenous Q8H    mexiletine  250 mg Oral Q8H    mirtazapine  30 mg Oral QHS    pantoprazole  40 mg Oral Daily with lunch    polyethylene glycol  17 g Oral Daily     PRN Meds:sodium chloride, sodium chloride, sodium chloride 0.9%, sodium chloride 0.9%, ALPRAZolam, oxyCODONE-acetaminophen, zolpidem    Review of patient's allergies indicates:   Allergen Reactions    Lisinopril Anaphylaxis    Hydralazine analogues      Chronic constipation, impotence, dizziness     Objective:     Vital Signs (Most Recent):  Temp: 96.5 °F (35.8 °C) (09/27/22 1201)  Pulse: 80 (09/27/22 1401)  Resp: (!) 23 (09/27/22 1401)  BP: 107/79 (09/27/22 1201)  SpO2: (!) 94 % (09/27/22 1201)   Vital Signs (24h Range):  Temp:  [96.5 °F (35.8 °C)-98.9 °F (37.2 °C)] 96.5 °F (35.8 °C)  Pulse:  [] 80  Resp:  [9-32] 23  SpO2:  [83 %-96 %] 94 %  BP: ()/(0-94) 107/79  Arterial Line BP: (64-76)/(56-66) 76/66     Patient Vitals for the past 72 hrs (Last 3 readings):   Weight   09/27/22 0400 90.5 kg (199 lb 8.3 oz)   09/25/22 0105 90.2 kg (198 lb 13.7 oz)   09/24/22 2136 90.2 kg (198 lb 13.7 oz)       Body mass index is 26.32 kg/m².      Intake/Output Summary (Last 24 hours) at 9/27/2022 1423  Last data filed at 9/27/2022 1401  Gross per 24 hour   Intake 2683.21 ml    Output 1055 ml   Net 1628.21 ml         Hemodynamic Parameters:       Telemetry: SR    Physical Exam  Constitutional:       Appearance: Normal appearance.   HENT:      Head: Normocephalic and atraumatic.   Eyes:      Extraocular Movements: Extraocular movements intact.      Pupils: Pupils are equal, round, and reactive to light.   Neck:      Comments: Neck veins are not elevated  Cardiovascular:      Rate and Rhythm: Normal rate and regular rhythm.      Comments: Smooth VAD hum  Pulmonary:      Effort: Pulmonary effort is normal.      Breath sounds: Normal breath sounds.   Abdominal:      General: Bowel sounds are normal.      Palpations: Abdomen is soft.   Musculoskeletal:         General: No swelling. Normal range of motion.      Cervical back: Normal range of motion and neck supple.      Right lower leg: No edema.      Left lower leg: No edema.      Comments: R groin with wound vac and CLEO drain. No drainage or bleeding.    Skin:     General: Skin is warm and dry.      Capillary Refill: Capillary refill takes 2 to 3 seconds.   Neurological:      General: No focal deficit present.      Mental Status: He is alert and oriented to person, place, and time.   Psychiatric:         Mood and Affect: Mood normal.         Behavior: Behavior normal.         Thought Content: Thought content normal.         Judgment: Judgment normal.       Significant Labs:  CBC:  Recent Labs   Lab 09/26/22  1702 09/26/22  2332 09/27/22  0300   WBC 6.98 7.06 7.42   RBC 3.52* 3.71* 3.93*   HGB 9.7* 10.1* 10.7*   HCT 31.6* 33.2* 35.2*    246 265   MCV 90 90 90   MCH 27.6 27.2 27.2   MCHC 30.7* 30.4* 30.4*       BNP:  Recent Labs   Lab 09/22/22  1225 09/24/22  1803 09/26/22  0353   * 229* 97       CMP:  Recent Labs   Lab 09/25/22  0903 09/26/22  0353 09/27/22  0300   GLU 74 123* 69*   CALCIUM 8.4* 8.3* 8.6*   ALBUMIN 2.3* 2.2* 2.4*   PROT 5.8* 5.7* 6.1    137 135*   K 4.7 3.8 4.0   CO2 24 24 23    105 105   BUN 11 12 10    CREATININE 1.5* 1.6* 1.4   ALKPHOS 81 81 84   ALT 16 16 19   AST 31 28 36   BILITOT 0.4 0.5 0.6        Coagulation:   Recent Labs   Lab 09/26/22  0353 09/26/22  1702 09/27/22  0300   INR 2.3* 1.5* 1.3*       LDH:  Recent Labs   Lab 09/26/22  0353 09/27/22  0300   * 424*       Microbiology:  Microbiology Results (last 7 days)       Procedure Component Value Units Date/Time    Gram stain [[id number]] Collected: 09/27/22 0944    Order Status: Completed Specimen: Wound from Groin Updated: 09/27/22 1312     Gram Stain Result Few WBC's      No organisms seen    Narrative:      1. RIGHT GROIN WOUND    Gram stain [[id number]] Collected: 09/27/22 0944    Order Status: Completed Specimen: Wound from Groin Updated: 09/27/22 1204     Gram Stain Result No WBC's      No organisms seen    Narrative:      2. RIGHT FEMORAL ARTERY    Gram stain [[id number]] Collected: 09/27/22 0944    Order Status: Completed Specimen: Wound from Groin Updated: 09/27/22 1203     Gram Stain Result No WBC's      No organisms seen    Narrative:      3. RIGHT FEMORAL GRAFT    Gram stain [[id number]] Collected: 09/27/22 0944    Order Status: Completed Specimen: Wound from Groin Updated: 09/27/22 1202     Gram Stain Result No WBC's      No organisms seen    Narrative:      4. FEMORAL TISSUE RIGHT GROIN    Culture, Anaerobe [[id number]] Collected: 09/25/22 0815    Order Status: Completed Specimen: Wound from Groin Updated: 09/27/22 1129     Anaerobic Culture Culture in progress    Aerobic culture [[id number]] Collected: 09/27/22 0944    Order Status: Sent Specimen: Wound from Groin Updated: 09/27/22 1036    Culture, Anaerobe [[id number]] Collected: 09/27/22 0944    Order Status: Sent Specimen: Wound from Groin Updated: 09/27/22 1036    AFB Culture & Smear [[id number]] Collected: 09/27/22 0944    Order Status: Sent Specimen: Wound from Groin Updated: 09/27/22 1036    Fungus culture [[id number]] Collected: 09/27/22 0944    Order Status: Sent Specimen: Wound  from Groin Updated: 09/27/22 1036    AFB Culture & Smear [[id number]] Collected: 09/27/22 0944    Order Status: Sent Specimen: Wound from Groin Updated: 09/27/22 1035    Fungus culture [[id number]] Collected: 09/27/22 0944    Order Status: Sent Specimen: Wound from Groin Updated: 09/27/22 1034    Culture, Anaerobe [[id number]] Collected: 09/27/22 0944    Order Status: Sent Specimen: Wound from Groin Updated: 09/27/22 1034    Aerobic culture [[id number]] Collected: 09/27/22 0944    Order Status: Sent Specimen: Wound from Groin Updated: 09/27/22 1034    AFB Culture & Smear [[id number]] Collected: 09/27/22 0944    Order Status: Sent Specimen: Wound from Groin Updated: 09/27/22 1032    Fungus culture [[id number]] Collected: 09/27/22 0944    Order Status: Sent Specimen: Wound from Groin Updated: 09/27/22 1032    Culture, Anaerobe [[id number]] Collected: 09/27/22 0944    Order Status: Sent Specimen: Wound from Groin Updated: 09/27/22 1031    Aerobic culture [[id number]] Collected: 09/27/22 0944    Order Status: Sent Specimen: Wound from Groin Updated: 09/27/22 1031    Aerobic culture [[id number]] Collected: 09/27/22 0944    Order Status: Sent Specimen: Wound from Groin Updated: 09/27/22 1030    Fungus culture [[id number]] Collected: 09/27/22 0944    Order Status: Sent Specimen: Wound from Groin Updated: 09/27/22 1029    Culture, Anaerobe [[id number]] Collected: 09/27/22 0944    Order Status: Sent Specimen: Wound from Groin Updated: 09/27/22 1029    AFB Culture & Smear [[id number]] Collected: 09/27/22 0944    Order Status: Sent Specimen: Wound from Groin Updated: 09/27/22 1029    Aerobic culture [[id number]]  (Abnormal)  (Susceptibility) Collected: 09/25/22 0815    Order Status: Completed Specimen: Wound from Groin Updated: 09/27/22 0945     Aerobic Bacterial Culture ESCHERICHIA COLI ESBL  Few      Blood culture [[id number]] Collected: 09/25/22 0133    Order Status: Completed Specimen: Blood from Peripheral, Lower Arm, Left Updated:  09/27/22 0613     Blood Culture, Routine No Growth to date      No Growth to date      No Growth to date    Narrative:      Collection has been rescheduled by TR3 at 09/25/2022 00:31 Reason: Pt   dont wanna get stuck rn rachille  Collection has been rescheduled by TR3 at 09/25/2022 00:31 Reason: Pt   dont wanna get stuck rn rachille    Blood culture [[id number]] Collected: 09/25/22 0138    Order Status: Completed Specimen: Blood from Peripheral, Hand, Right Updated: 09/27/22 0613     Blood Culture, Routine No Growth to date      No Growth to date      No Growth to date    Narrative:      Collection has been rescheduled by TR3 at 09/25/2022 00:31 Reason: Pt   dont wanna get stuck rn rachille  Collection has been rescheduled by TR3 at 09/25/2022 00:31 Reason: Pt   dont wanna get stuck rn rachille    Fungus culture [[id number]] Collected: 09/25/22 0815    Order Status: Completed Specimen: Wound from Groin Updated: 09/26/22 1150     Fungus (Mycology) Culture Culture in progress    Blood culture [[id number]]     Order Status: Canceled Specimen: Blood     Blood culture [[id number]]     Order Status: Canceled Specimen: Blood             I have reviewed all pertinent labs within the past 24 hours.    Estimated Creatinine Clearance: 67.4 mL/min (based on SCr of 1.4 mg/dL).    Diagnostic Results:  I have reviewed and interpreted all pertinent imaging results/findings within the past 24 hours.

## 2022-09-27 NOTE — OP NOTE
Date of surgery 09/27/2022   Preoperative diagnosis right groin wound with exposed Dacron vascular graft  Postoperative diagnosis the same  Procedure performed  1. Sartorius muscle flap to the right groin   2. Rectus femoris muscle flap to the right groin  3. Complex closure of the right groin wound measuring 15 cm.    Surgeon Rayshawn   Anesthesia general  Complications none   Blood loss 100 cc   Drains x1     After completion of the vascular portion of the procedure I entered the room.  Patient had an iliofemoral Dacron graft placed.  This patient is an LVAD patient had an infected right groin.  Due to the urgency of the situation and his potential heart transplant, we would do everything possible to cover all of the graft.  The wound was then reexcised of all nonviable tissue.  We did find a sinus tract which was also excised.  Next, the sartorius muscle was identified.  It was divided at its origin.  The was dissected distally as far as possible to the 1st .  This was dopplered.  This gave ample tissue to mobilize the sartorius muscle and wrap the distal 1/2 of the Dacron graft including the anastomosis.  Thus the muscle was completely wrapped around the graft and sewed using a running 2-0 Vicryl suture.  Next to cover the upper portion of the graft.  We created a tunnel overlying the graft far further then the anastomosis.  An incision down the anterior portion of the leg was then made the rectus femoris muscle was identified and divided distally.  The tendinous portion was not taken.  Next, the muscle was passed through a subcutaneous tunnel and using double-armed Prolene sutures was pulled far past the superior anastomosis and tied using Prolene sutures on top of the fascia.  Next, a running 2-0 Vicryl suture on all aspects was performed creating is near watertight closure as we could.  It was not able to close the skin.  The upper portion could be closed using 3-0 Monocryl and nylon in the lower  portion be closed using 3-0 Monocryl and nylon sutures.    The central portion had to be left open and the dermis was closed in a complex fashion suturing the dermis down to the rectus femoris muscle in a running fashion.  Several interrupted Vicryl sutures were also placed to secured.  A drain had been placed from the lower extremity incision by the rectus femoris muscle.  The leg was then closed in multiple layers using 2-0 Vicryl 3-0 Monocryl and a running 4-0 Monocryl subcuticular suture.

## 2022-09-27 NOTE — PROGRESS NOTES
09/27/2022  Shirley Porras    Current provider:  Antonio Hadley Jr.,*    Device interrogation:  TXP LVAD INTERROGATIONS 9/27/2022 9/27/2022 9/27/2022 9/27/2022 9/27/2022 9/27/2022 9/27/2022   Type HeartMate3 HeartMate3 HeartMate3 HeartMate3 HeartMate3 HeartMate3 HeartMate3   Flow 5.3 5.5 5.6 5.6 5.5 5.5 5.4   Speed 6400 6300 6300 6300 6300 6300 6300   PI 2.7 1.8 1.8 1.8 2.2 2.1 2.9   Power ([place]) 5.6 5.4 5.4 5.4 5.4 5.4 5.4   LSL 6000 5900 5900 5900 5900 5900 5900   Low Flow Alarm - - - - - - -   High Power Alarm - - - - - - -   Pulsatility Intermittent pulse - - Intermittent pulse - - -          Rounded on Tim Richards to ensure all mechanical assist device settings (IABP or VAD) were appropriate and all parameters were within limits.  I was able to ensure all back up equipment was present, the staff had no issues, and the Perfusion Department daily rounding was complete.      For implantable VADs: Interrogation of Ventricular assist device was performed with analysis of device parameters and review of device function. I have personally reviewed the interrogation findings and agree with findings as stated.     In emergency, the nursing units have been notified to contact the perfusion department either by:  Calling [phone] from 630am to 4pm Mon thru Fri, utilizing the On-Call Finder functionality of Epic and searching for Perfusion, or by contacting the hospital  from 4pm to 630am and on weekends and asking to speak with the perfusionist on call.    1:05 PM

## 2022-09-27 NOTE — PROGRESS NOTES
[facility]  Heart Transplant  Progress Note    Patient Name: Tim Richards  MRN: [id number]  Admission Date: 9/24/2022  Hospital Length of Stay: 3 days  Attending Physician: Antonio Hadley Jr.,*  Primary Care Provider: Deyanira Booth MD  Principal Problem:Pseudoaneurysm of right femoral artery    Subjective:     **Interval History: Patient is s/p R groin exploration with creation of R ileofemoral bypass, distal external iliac to distal femoral artery, and creation of muscle flap for pseudoanyeurism repair. Patient in pain but is stable recovering in CICU. Just on .02 levo for BP support. ID following for + wound cultures that are now growing ESBL E Coli.    Continuous Infusions:   NORepinephrine bitartrate-D5W 0.02 mcg/kg/min (09/27/22 1401)     Scheduled Meds:   amiodarone  400 mg Oral Daily    DAPTOmycin (CUBICIN) IV (PEDS and ADULTS)  6 mg/kg Intravenous Q24H    levothyroxine  50 mcg Oral Before breakfast    magnesium oxide  400 mg Oral Daily with lunch    meropenem (MERREM) IVPB  2 g Intravenous Q8H    mexiletine  250 mg Oral Q8H    mirtazapine  30 mg Oral QHS    pantoprazole  40 mg Oral Daily with lunch    polyethylene glycol  17 g Oral Daily     PRN Meds:sodium chloride, sodium chloride, sodium chloride 0.9%, sodium chloride 0.9%, ALPRAZolam, oxyCODONE-acetaminophen, zolpidem    Review of patient's allergies indicates:   Allergen Reactions    Lisinopril Anaphylaxis    Hydralazine analogues      Chronic constipation, impotence, dizziness     Objective:     Vital Signs (Most Recent):  Temp: 96.5 °F (35.8 °C) (09/27/22 1201)  Pulse: 80 (09/27/22 1401)  Resp: (!) 23 (09/27/22 1401)  BP: 107/79 (09/27/22 1201)  SpO2: (!) 94 % (09/27/22 1201)   Vital Signs (24h Range):  Temp:  [96.5 °F (35.8 °C)-98.9 °F (37.2 °C)] 96.5 °F (35.8 °C)  Pulse:  [] 80  Resp:  [9-32] 23  SpO2:  [83 %-96 %] 94 %  BP: ()/(0-94) 107/79  Arterial Line BP: (64-76)/(56-66) 76/66      Patient Vitals for the past 72 hrs (Last 3 readings):   Weight   09/27/22 0400 90.5 kg (199 lb 8.3 oz)   09/25/22 0105 90.2 kg (198 lb 13.7 oz)   09/24/22 2136 90.2 kg (198 lb 13.7 oz)       Body mass index is 26.32 kg/m².      Intake/Output Summary (Last 24 hours) at 9/27/2022 1423  Last data filed at 9/27/2022 1401  Gross per 24 hour   Intake 2683.21 ml   Output 1055 ml   Net 1628.21 ml         Hemodynamic Parameters:       Telemetry: SR    Physical Exam  Constitutional:       Appearance: Normal appearance.   HENT:      Head: Normocephalic and atraumatic.   Eyes:      Extraocular Movements: Extraocular movements intact.      Pupils: Pupils are equal, round, and reactive to light.   Neck:      Comments: Neck veins are not elevated  Cardiovascular:      Rate and Rhythm: Normal rate and regular rhythm.      Comments: Smooth VAD hum  Pulmonary:      Effort: Pulmonary effort is normal.      Breath sounds: Normal breath sounds.   Abdominal:      General: Bowel sounds are normal.      Palpations: Abdomen is soft.   Musculoskeletal:         General: No swelling. Normal range of motion.      Cervical back: Normal range of motion and neck supple.      Right lower leg: No edema.      Left lower leg: No edema.      Comments: R groin with wound vac and CLEO drain. No drainage or bleeding.    Skin:     General: Skin is warm and dry.      Capillary Refill: Capillary refill takes 2 to 3 seconds.   Neurological:      General: No focal deficit present.      Mental Status: He is alert and oriented to person, place, and time.   Psychiatric:         Mood and Affect: Mood normal.         Behavior: Behavior normal.         Thought Content: Thought content normal.         Judgment: Judgment normal.       Significant Labs:  CBC:  Recent Labs   Lab 09/26/22  1702 09/26/22  2332 09/27/22  0300   WBC 6.98 7.06 7.42   RBC 3.52* 3.71* 3.93*   HGB 9.7* 10.1* 10.7*   HCT 31.6* 33.2* 35.2*    246 265   MCV 90 90 90   MCH 27.6 27.2 27.2    MCHC 30.7* 30.4* 30.4*       BNP:  Recent Labs   Lab 09/22/22  1225 09/24/22  1803 09/26/22  0353   * 229* 97       CMP:  Recent Labs   Lab 09/25/22  0903 09/26/22  0353 09/27/22  0300   GLU 74 123* 69*   CALCIUM 8.4* 8.3* 8.6*   ALBUMIN 2.3* 2.2* 2.4*   PROT 5.8* 5.7* 6.1    137 135*   K 4.7 3.8 4.0   CO2 24 24 23    105 105   BUN 11 12 10   CREATININE 1.5* 1.6* 1.4   ALKPHOS 81 81 84   ALT 16 16 19   AST 31 28 36   BILITOT 0.4 0.5 0.6        Coagulation:   Recent Labs   Lab 09/26/22  0353 09/26/22  1702 09/27/22  0300   INR 2.3* 1.5* 1.3*       LDH:  Recent Labs   Lab 09/26/22  0353 09/27/22  0300   * 424*       Microbiology:  Microbiology Results (last 7 days)       Procedure Component Value Units Date/Time    Gram stain [[id number]] Collected: 09/27/22 0944    Order Status: Completed Specimen: Wound from Groin Updated: 09/27/22 1312     Gram Stain Result Few WBC's      No organisms seen    Narrative:      1. RIGHT GROIN WOUND    Gram stain [[id number]] Collected: 09/27/22 0944    Order Status: Completed Specimen: Wound from Groin Updated: 09/27/22 1204     Gram Stain Result No WBC's      No organisms seen    Narrative:      2. RIGHT FEMORAL ARTERY    Gram stain [[id number]] Collected: 09/27/22 0944    Order Status: Completed Specimen: Wound from Groin Updated: 09/27/22 1203     Gram Stain Result No WBC's      No organisms seen    Narrative:      3. RIGHT FEMORAL GRAFT    Gram stain [[id number]] Collected: 09/27/22 0944    Order Status: Completed Specimen: Wound from Groin Updated: 09/27/22 1202     Gram Stain Result No WBC's      No organisms seen    Narrative:      4. FEMORAL TISSUE RIGHT GROIN    Culture, Anaerobe [[id number]] Collected: 09/25/22 0815    Order Status: Completed Specimen: Wound from Groin Updated: 09/27/22 1129     Anaerobic Culture Culture in progress    Aerobic culture [[id number]] Collected: 09/27/22 0944    Order Status: Sent Specimen: Wound from Groin Updated:  09/27/22 1036    Culture, Anaerobe [[id number]] Collected: 09/27/22 0944    Order Status: Sent Specimen: Wound from Groin Updated: 09/27/22 1036    AFB Culture & Smear [[id number]] Collected: 09/27/22 0944    Order Status: Sent Specimen: Wound from Groin Updated: 09/27/22 1036    Fungus culture [[id number]] Collected: 09/27/22 0944    Order Status: Sent Specimen: Wound from Groin Updated: 09/27/22 1036    AFB Culture & Smear [[id number]] Collected: 09/27/22 0944    Order Status: Sent Specimen: Wound from Groin Updated: 09/27/22 1035    Fungus culture [[id number]] Collected: 09/27/22 0944    Order Status: Sent Specimen: Wound from Groin Updated: 09/27/22 1034    Culture, Anaerobe [[id number]] Collected: 09/27/22 0944    Order Status: Sent Specimen: Wound from Groin Updated: 09/27/22 1034    Aerobic culture [[id number]] Collected: 09/27/22 0944    Order Status: Sent Specimen: Wound from Groin Updated: 09/27/22 1034    AFB Culture & Smear [[id number]] Collected: 09/27/22 0944    Order Status: Sent Specimen: Wound from Groin Updated: 09/27/22 1032    Fungus culture [[id number]] Collected: 09/27/22 0944    Order Status: Sent Specimen: Wound from Groin Updated: 09/27/22 1032    Culture, Anaerobe [[id number]] Collected: 09/27/22 0944    Order Status: Sent Specimen: Wound from Groin Updated: 09/27/22 1031    Aerobic culture [[id number]] Collected: 09/27/22 0944    Order Status: Sent Specimen: Wound from Groin Updated: 09/27/22 1031    Aerobic culture [[id number]] Collected: 09/27/22 0944    Order Status: Sent Specimen: Wound from Groin Updated: 09/27/22 1030    Fungus culture [[id number]] Collected: 09/27/22 0944    Order Status: Sent Specimen: Wound from Groin Updated: 09/27/22 1029    Culture, Anaerobe [[id number]] Collected: 09/27/22 0944    Order Status: Sent Specimen: Wound from Groin Updated: 09/27/22 1029    AFB Culture & Smear [[id number]] Collected: 09/27/22 0944    Order Status: Sent Specimen: Wound from Groin Updated: 09/27/22  1029    Aerobic culture [[id number]]  (Abnormal)  (Susceptibility) Collected: 09/25/22 0815    Order Status: Completed Specimen: Wound from Groin Updated: 09/27/22 0945     Aerobic Bacterial Culture ESCHERICHIA COLI ESBL  Few      Blood culture [[id number]] Collected: 09/25/22 0133    Order Status: Completed Specimen: Blood from Peripheral, Lower Arm, Left Updated: 09/27/22 0613     Blood Culture, Routine No Growth to date      No Growth to date      No Growth to date    Narrative:      Collection has been rescheduled by TR3 at 09/25/2022 00:31 Reason: Pt   dont wanna get stuck rn rachille  Collection has been rescheduled by TR3 at 09/25/2022 00:31 Reason: Pt   dont wanna get stuck rn rachille    Blood culture [[id number]] Collected: 09/25/22 0138    Order Status: Completed Specimen: Blood from Peripheral, Hand, Right Updated: 09/27/22 0613     Blood Culture, Routine No Growth to date      No Growth to date      No Growth to date    Narrative:      Collection has been rescheduled by TR3 at 09/25/2022 00:31 Reason: Pt   dont wanna get stuck rn rachille  Collection has been rescheduled by TR3 at 09/25/2022 00:31 Reason: Pt   dont wanna get stuck rn rachille    Fungus culture [[id number]] Collected: 09/25/22 0815    Order Status: Completed Specimen: Wound from Groin Updated: 09/26/22 1150     Fungus (Mycology) Culture Culture in progress    Blood culture [[id number]]     Order Status: Canceled Specimen: Blood     Blood culture [[id number]]     Order Status: Canceled Specimen: Blood             I have reviewed all pertinent labs within the past 24 hours.    Estimated Creatinine Clearance: 67.4 mL/min (based on SCr of 1.4 mg/dL).    Diagnostic Results:  I have reviewed and interpreted all pertinent imaging results/findings within the past 24 hours.    Assessment and Plan:     Tim Richards is a 56 y.o.  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  His post  operative course was complicated by cardiogenic shock/RV failure requiring VA-ECMO.  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  He has a 8 Fr cuffed Shiley trach.  He had a prolonged and complicated hospital course from 6/24/22 - 9/4/22, was discharged to rehab (see clinic visit from 9/22/22 for additional details).  Of note he was last seen in clinic by Dr. Ragsdale on 9/22/22 after being discharged from rehab and at the time was noted to have purulence from the groin site and was placed on doxycycline PO.  He has had complaints of fatigue since his last hospitalization, per his wife.  This morning he was sitting and was crossing his legs for a prolonged period of time, and when he uncrossed them he noticed a pool of blood under him and active bleeding from his R groin where his previous ECMO cannula was.  He began to feel lightheaded when he tried walking.  Per wife he had 2 low flow alarms at home.      In the ED, patient stated he felt at his baseline and had no complaints.  No active bleeding at the time of my evaluation from R groin site.  Doppler BP was 80/0.  Hgb dropped from 9.4 -> 8.9 over the past 2 days.  INR was 3.0.  CT Angio performed per CV surgery recommendations.  HTS was consulted for admission, CT surgery was consulted in the ED for evaluation of groin hematoma.       Cardiovascular Studies  TTE 8/30/22   There is an LVAD present. Base speed is 6300 RPMs. The pump type is a Heartmate III. The interventricular septum appears midline. The aortic valve does not open.   The left ventricle is severely enlarged with eccentric hypertrophy and severely decreased systolic function.   The estimated ejection fraction is 10%.   There is left ventricular global hypokinesis.   Left ventricular diastolic dysfunction.   There is trivial continuous aortic  regurgitation.   There is abnormal septal wall motion.   The right ventricle is poorly seen, but appears enlarged with reduced systolic function.   Mild tricuspid regurgitation.      * Pseudoaneurysm of right femoral artery  Vascular surgery consulted, appreciate recommendations  CTA shows 3 new pseudoaneurysms of R femoral artery  S/p  Right Ileofemoral bypass (end to end). Distal External Iliac to distal femoral artery    Groin hematoma  -Was on VA-ECMO at last hospitalization with successful decannulation  -CTA revealed 3 possible new pseudoaneurysms  -INR 3 on admission, since reversed with PO vit K and FFP.  Holding A/C until further instruction from surgical team regarding resumption of A/C.   -Hgb was trending down since admit. Transfusing to keep Hgb > 10  -Cultures from right groin wound and blood done 9/25/with NGTD. Continue empiric Cefepime and Daptomycin (not using Vanc 2/2 recent dye load with CTA)    VT (ventricular tachycardia)  -Patient has [employer] SICD  -Currently on amiodarone 400mg qd and mexiletine 250mg TID, will continue     LVAD (left ventricular assist device) present  -S/P S/P DT HM2 3/8/2018 then S/P DT HM3 exchange 7/13/2022 2/2 to thrombosis of HM2 thought caused by COVID PNA  -Current speed 6300  -Last TTE done 8/30/2022 at 6300: LVEDD 7.44, LVEF 10%, RV appears enlarged and decreased, trace AI, trace MR, mild TR, PAS > 28, IVC 3, AV does not open, septum midline  -Coumadin on hold due to going to OR this AM for pseudoanyeurism repari.   -LDH is stable    Procedure: Device Interrogation Including analysis of device parameters  Current Settings: Ventricular Assist Device  Review of device function is stable    TXP LVAD INTERROGATIONS 9/27/2022 9/27/2022 9/27/2022 9/27/2022 9/27/2022 9/27/2022 9/27/2022   Type HeartMate3 HeartMate3 HeartMate3 HeartMate3 HeartMate3 HeartMate3 HeartMate3   Flow 5.4 5.4 5.3 5.5 5.6 5.6 5.5   Speed 6400 6500 6400 6300 6300 6300 6300   PI 2.5 2.9 2.7 1.8  1.8 1.8 2.2   Power (Jackson) 5.6 5.5 5.6 5.4 5.4 5.4 5.4   LSL - - 6000 5900 5900 5900 5900   Low Flow Alarm - - - - - - -   High Power Alarm - - - - - - -   Pulsatility - - Intermittent pulse - - Intermittent pulse -         Chronic combined systolic and diastolic congestive heart failure  Currently appears clinically euvolemic  Maintain K>4.0, Mg>2.0      Uninterrupted Critical Care/Counseling Time (not including procedures): 45 min.       Yadiel Coley PA-C  Heart Transplant  [facility]

## 2022-09-27 NOTE — ANESTHESIA PROCEDURE NOTES
Arterial    Diagnosis: aneurysm    Patient location during procedure: done in OR  Procedure start time: 9/27/2022 7:16 AM  Timeout: 9/27/2022 7:15 AM  Procedure end time: 9/27/2022 7:17 AM    Staffing  Authorizing Provider: Fernando Washington MD  Performing Provider: Noah Erickson MD    Anesthesiologist was present at the time of the procedure.    Preanesthetic Checklist  Completed: patient identified, IV checked, site marked, risks and benefits discussed, surgical consent, monitors and equipment checked, pre-op evaluation, timeout performed and anesthesia consent givenArterial  Skin Prep: chlorhexidine gluconate  Local Infiltration: none  Orientation: right  Location: radial    Catheter Size: 20 G  Catheter placement by Ultrasound guidance. Heme positive aspiration all ports.   Vessel Caliber: small, patent, compressibility normal  Vascular Doppler:  not done  Needle advanced into vessel with real time Ultrasound guidance.Insertion Attempts: 1  Assessment  Dressing: secured with tape and tegaderm  Patient: Tolerated well

## 2022-09-27 NOTE — PLAN OF CARE
Pt off the floor during rounds - in OR this morning. Prior wound cx with ESBL Ecoli.   Continue daptomycin; stop cefepime and switch to meropenem (to empirically cover for PSA while awaiting OR cx). D/w primary team.

## 2022-09-27 NOTE — ASSESSMENT & PLAN NOTE
Vascular surgery consulted, appreciate recommendations  CTA shows 3 new pseudoaneurysms of R femoral artery  S/p  Right Ileofemoral bypass (end to end). Distal External Iliac to distal femoral artery

## 2022-09-27 NOTE — BRIEF OP NOTE
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 Distal External Iliac to distal femoral artery  Explant, infected dacron femoral conduit    Anesthesia: General    Operative Findings: Purulence seen sent for culture gram stain and fungal. Right CFA completely blown out at the old dacron conduit. Insitu bypass created with rifampin soaked dacron graft. Good signals in CFA, SFA and profunda at the end of the case. Plastics for Muscle flap creation at end of case.     Estimated Blood Loss: * No values recorded between 9/27/2022  8:26 AM and 9/27/2022 10:46 AM *    Estimated Blood Loss has not been documented. EBL = 50cc.         Specimens:   Specimen (24h ago, onward)      None            [id number]    Kentrell Kim MD PGY7  Vascular Surgery Fellow  [phone]

## 2022-09-27 NOTE — PLAN OF CARE
Cardiac ICU Care Plan    POC reviewed with Tim Yonis Maurice and family. Questions and concerns addressed. No acute events today. Pt progressing toward goals. Will continue to monitor. See below and flowsheets for full assessment and VS info.       Neuro:  Deedee Coma Scale  Best Eye Response: 4-->(E4) spontaneous  Best Motor Response: 6-->(M6) obeys commands  Best Verbal Response: 5-->(V5) oriented  Deedee Coma Scale Score: 15  Assessment Qualifiers: patient not sedated/intubated  Pupil PERRLA: yes    24 hr Temp:  [96.5 °F (35.8 °C)-98.9 °F (37.2 °C)]      CV:  Rhythm: normal sinus rhythm   DVT prophylaxis: VTE Required Core Measure: Per order contraindicated for SCDs/Anticoagulants                    Type: HeartMate3       Pulses  Right Radial Pulse: Doppler  Left Radial Pulse: Doppler  Right Dorsalis Pedis Pulse: Doppler  Left Dorsalis Pedis Pulse: Doppler  Right Posterior Tibial Pulse: Doppler  Left Posterior Tibial Pulse: Doppler    Resp:  O2 Device (Oxygen Therapy): room air       GI/:  GI prophylaxis: yes  Diet/Nutrition Received: 2 gram sodium  Last Bowel Movement: 09/26/22       Urethral Catheter 09/27/22 0749 Non-latex 16 Fr.-Reason for Continuing Urinary Catheterization: Critically ill in ICU and requiring hourly monitoring of intake/output   Intake/Output Summary (Last 24 hours) at 9/27/2022 1705  Last data filed at 9/27/2022 1601  Gross per 24 hour   Intake 2206.15 ml   Output 1115 ml   Net 1091.15 ml        Nutritional Supplement Intake: Quantity 0  , Type:  n/a    Labs/Accuchecks:  Recent Labs   Lab 09/26/22  2332 09/27/22  0300 09/27/22  1543   WBC 7.06 7.42 12.20   RBC 3.71* 3.93* 3.59*   HGB 10.1* 10.7* 10.0*   HCT 33.2* 35.2* 31.7*    265 221      Recent Labs   Lab 09/26/22  0353 09/26/22  1702 09/27/22  0300   INR 2.3* 1.5* 1.3*      Recent Labs     09/27/22  0300   *   K 4.0   CO2 23      BUN 10   CREATININE 1.4   ALKPHOS 84   ALT 19   AST 36   BILITOT 0.6       Recent  Labs   Lab 09/24/22  1803 09/26/22  0353   CPK  --  57   TROPONINI 0.569*  --     No results for input(s): PH, PCO2, PO2, HCO3, POCSATURATED, BE in the last 72 hours.    Electrolytes: Electrolytes replaced  Accuchecks: none    Gtts/LDAs:   NORepinephrine bitartrate-D5W Stopped (09/27/22 1534)       Lines/Drains/Airways       Central Venous Catheter Line  Duration              Introducer with Double Lumen 09/27/22 0851 <1 day    Introducer 09/27/22 0851 <1 day              Drain  Duration                  Closed/Suction Drain 09/27/22 1051 Proximal;Right Groin Bulb 15 Fr. <1 day         Urethral Catheter 09/27/22 0749 Non-latex 16 Fr. <1 day              Airway  Duration                  Surgical Airway -- days         Surgical Airway 08/04/22 Shiley Cuffed 54 days              Arterial Line  Duration             Arterial Line 09/27/22 0831 Left Other (Comment) <1 day              Line  Duration                  VAD 07/13/22 1300 Left ventricular assist device HeartMate 3 76 days              Peripheral Intravenous Line  Duration                  Peripheral IV - Single Lumen 09/24/22 1801 22 G Posterior;Right Forearm 2 days         Peripheral IV - Single Lumen 09/25/22 0900 20 G Anterior;Left;Proximal Forearm 2 days                    Skin/Wounds  Bathing/Skin Care: bath, complete;back care;dressed/undressed;foot care;incontinence care;linen changed;nail care (Shaved bilateral groin sites) (09/26/22 1705)  Wounds: Yes  Wound care consulted: Yes

## 2022-09-27 NOTE — ASSESSMENT & PLAN NOTE
-S/P S/P DT HM2 3/8/2018 then S/P DT HM3 exchange 7/13/2022 2/2 to thrombosis of HM2 thought caused by COVID PNA  -Current speed 6300  -Last TTE done 8/30/2022 at 6300: LVEDD 7.44, LVEF 10%, RV appears enlarged and decreased, trace AI, trace MR, mild TR, PAS > 28, IVC 3, AV does not open, septum midline  -Coumadin on hold due to going to OR this AM for pseudoanyeurism repari.   -LDH is stable    Procedure: Device Interrogation Including analysis of device parameters  Current Settings: Ventricular Assist Device  Review of device function is stable    TXP LVAD INTERROGATIONS 9/27/2022 9/27/2022 9/27/2022 9/27/2022 9/27/2022 9/27/2022 9/27/2022   Type HeartMate3 HeartMate3 HeartMate3 HeartMate3 HeartMate3 HeartMate3 HeartMate3   Flow 5.4 5.4 5.3 5.5 5.6 5.6 5.5   Speed 6400 6500 6400 6300 6300 6300 6300   PI 2.5 2.9 2.7 1.8 1.8 1.8 2.2   Power ([place]) 5.6 5.5 5.6 5.4 5.4 5.4 5.4   LSL - - 6000 5900 5900 5900 5900   Low Flow Alarm - - - - - - -   High Power Alarm - - - - - - -   Pulsatility - - Intermittent pulse - - Intermittent pulse -

## 2022-09-27 NOTE — H&P
Interval H&P:  The patient has been examined and the H&P has been reviewed:  I concur with the findings and no changes have occurred since H&P was written.      Anesthesia/Surgery risks, benefits and alternative options discussed and understood by patient/family.      Subjective:      Interval History:  No acute overnight events, AF, VSS. Denies pain. Cultures of wound taken, pending. BCx NGTD. INR of 2.3, cont on heparin drip. Hgb 9 today. Plan for rt ileo-fem bypass and rt groin exploration.NPO at midnight. Consent signed and patient marked     Post-Op Info:  Procedure(s) (LRB):  CREATION, BYPASS, ARTERIAL, ILIAC TO FEMORAL (N/A)            Medications:  Continuous Infusions:  Scheduled Meds:   amiodarone  400 mg Oral Daily    ceFEPime (MAXIPIME) IVPB  2 g Intravenous Q8H    DAPTOmycin (CUBICIN) IV (PEDS and ADULTS)  6 mg/kg Intravenous Q24H    levothyroxine  50 mcg Oral Before breakfast    magnesium oxide  400 mg Oral Daily with lunch    mexiletine  250 mg Oral Q8H    mirtazapine  30 mg Oral QHS    pantoprazole  40 mg Oral Daily with lunch    polyethylene glycol  17 g Oral Daily      PRN Meds:sodium chloride, sodium chloride, sodium chloride, sodium chloride, sodium chloride, sodium chloride 0.9%, sodium chloride 0.9%, ALPRAZolam, hydrALAZINE, zolpidem      Objective:      Vital Signs (Most Recent):  Temp: 98.2 °F (36.8 °C) (09/26/22 0301)  Pulse: 86 (09/26/22 0600)  Resp: (!) 22 (09/26/22 0600)  BP: (!) 76/0 (09/26/22 0600)  SpO2: 95 % (09/26/22 0600)    Vital Signs (24h Range):  Temp:  [97.8 °F (36.6 °C)-98.3 °F (36.8 °C)] 98.2 °F (36.8 °C)  Pulse:  [] 86  Resp:  [10-33] 22  SpO2:  [81 %-99 %] 95 %  BP: (76-90)/(0) 76/0            Physical Exam  Constitutional:       Appearance: Normal appearance.   HENT:      Head: Normocephalic and atraumatic.      Mouth/Throat:      Mouth: Mucous membranes are moist.   Eyes:      Pupils: Pupils are equal, round, and reactive to light.   Cardiovascular:      Rate and  Rhythm: Regular rhythm.   Abdominal:      General: Abdomen is flat.      Palpations: Abdomen is soft.      Comments: Previous drive line site open healing with granulation tissue, no purulence noted   Musculoskeletal:      Comments: 5/5 strength all 4 extremities, palpable femoral, PT and DP pulses bilaterally. Right groin cutdown site open with granulation tissue and thrombus in wound bed, no active bleeding.    Neurological:      Mental Status: He is alert.         Significant Labs:  CBC:       Recent Labs   Lab 09/26/22  0521   WBC 6.81   RBC 3.32*   HGB 9.0*   HCT 29.7*      MCV 90   MCH 27.1   MCHC 30.3*      CMP:       Recent Labs   Lab 09/26/22  0353   *   CALCIUM 8.3*   ALBUMIN 2.2*   PROT 5.7*      K 3.8   CO2 24      BUN 12   CREATININE 1.6*   ALKPHOS 81   ALT 16   AST 28   BILITOT 0.5         Significant Diagnostics:  I have reviewed all pertinent imaging results/findings within the past 24 hours.     Assessment/Plan:      Pseudoaneurysm of femoral artery  Tim Richards is a 56 y.o. 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 Vascular surgery has been consulted for evaluation of right groin site concerning for pseudoaneurym.      Patient not currently bleeding, hemoglobin is stable, and INR supratherapeutic at 3. History of purulence from right groni wound also complicates decision making of what to do. Given patient's extensive comorbidities and high operative risk, we will plan to treat pseudoaneurysm with endovascular interventions     Recommend:  - Infectious Disease consultation with intention to treat infected wound and 6 covered endovascular stent  -Wound culture sent for anaerobic, aerobic, and fungal  -Broad-spectrum antibiotics  -Will plan for right ileo-femoral bypass and Rt groin exploration tomorrow. Consent  has been signed and patient is marked  -Risks, benefits, and alternatives discussed with the patient, he agrees with plan and wished to proceed  -NPO at midnight  -Strict bedrest, head of bed less than 30°, keep right leg straight  -Blood cultures x2: NGTD  -Hold coumadin, will need to transition to heparin gtt and hold during operation,   -Anticoagulation per primary team  - Pack wound and dress with tegaderm, vascular surgery will change BID  -Allow INR to drift, currently supra therapeutic  -Tranfuse FFP for goal normal INR, goal is INR<1.7. Recheck post transfusion  - Transfuse 1unit PRBC, goal HGB>10. Recheck post transfusion  - Start heparin ggt at level determined by heart failure service  - Plan for ileofemoral  bypass on 9/27, rifampin soaked dacron bypass, Cardiac anesthesia  - Plastics consultation for possible muscle flap coverage.  - Pack wound and dress with tegaderm, vascular surgery will change BID              Jc Sal MD  Vascular Surgery  [facility] - Cardiac Intensive Care

## 2022-09-27 NOTE — ANESTHESIA PROCEDURE NOTES
Intubation    Date/Time: 9/27/2022 7:18 AM  Performed by: Noah Erickson MD  Authorized by: Fernando Washington MD     Intubation:     Induction:  Inhalational - mask    Intubated:  Postinduction    Attempts:  1    Attempted By:  Staff anesthesiologist    Method of Intubation:  ETT into pre-existing tracheostomy    Difficult Airway Encountered?: No      Complications:  None    Airway Device:  Oral neri    Airway Device Size:  6.5    Style/Cuff Inflation:  Cuffed (inflated to minimal occlusive pressure)    Secured at:  The skin level of trach    Placement Verified By:  Capnometry    Complicating Factors:  None    Findings Post-Intubation:  BS equal bilateral

## 2022-09-27 NOTE — PLAN OF CARE
Pt 5th unit of PRBC finished during shift. Pt repeat hemoglobin >10. INR 1.3 this am. NPO since midnight for pseudoaneurysm repair on right femoral artery. Pt hypertensive overnight. Doxazosin given. Voided 400ml. Trach capped, clean, dry, and intact. Utilizing room air. Mag replaced this am. Wife at bedside throughout night.       Problem: Adult Inpatient Plan of Care  Goal: Plan of Care Review  Outcome: Ongoing, Progressing  Goal: Absence of Hospital-Acquired Illness or Injury  Outcome: Ongoing, Progressing  Goal: Optimal Comfort and Wellbeing  Outcome: Ongoing, Progressing     Problem: Bleeding (Ventricular Assist Device)  Goal: Absence of Bleeding  Outcome: Ongoing, Progressing     Problem: Embolism (Ventricular Assist Device)  Goal: Absence of Embolism Signs and Symptoms  Outcome: Ongoing, Progressing

## 2022-09-27 NOTE — ANESTHESIA PROCEDURE NOTES
Central Line    Diagnosis: aneurysm  Patient location during procedure: done in OR  Timeout: 9/27/2022 7:15 AM  Procedure end time: 9/27/2022 7:30 AM    Staffing  Authorizing Provider: Fernando Washington MD  Performing Provider: Noah Erickson MD    Anesthesiologist was present at the time of the procedure.  Preanesthetic Checklist  Completed: patient identified, IV checked, site marked, risks and benefits discussed, surgical consent, monitors and equipment checked, pre-op evaluation, timeout performed and anesthesia consent given  Indication   Indication: hemodynamic monitoring, vascular access, med administration     Anesthesia   general anesthesia    Central Line   Skin Prep: skin prepped with ChloraPrep, skin prep agent completely dried prior to procedure  Sterile Barriers Followed: Yes    All five maximal barriers used- gloves, gown, cap, mask, and large sterile sheet    hand hygiene performed prior to central venous catheter insertion  Location: left internal jugular.   Catheter type: introducer  Catheter Size: 14 Fr  Ultrasound: vascular probe with ultrasound   Vessel Caliber: medium, patent  Vascular Doppler:  not done  Needle advanced into vessel with real time Ultrasound guidance.  Guidewire confirmed in vessel.  sterile gel and probe cover used in ultrasound-guided central venous catheter insertion   Manometry: Venous cannualation confirmed by visual estimation of blood vessel pressure using manometry.  Insertion Attempts: 1   Securement:line sutured, chlorhexidine patch, sterile dressing applied and blood return through all ports    Post-Procedure    Adverse Events:none      Guidewire Guidewire removed intact. Guidewire removed intact, verified with nurse.

## 2022-09-27 NOTE — PLAN OF CARE
AICD turned off this morning for surgery.  Please contact device clinic when patient returns from surgery to turn on tachy therapy

## 2022-09-27 NOTE — TRANSFER OF CARE
"Anesthesia Transfer of Care Note    Patient: Tim Richards    Procedure(s) Performed: Procedure(s) (LRB):  CREATION, BYPASS, ARTERIAL, ILIAC TO FEMORAL WITH GRAFT (Right)  CREATION, FLAP, MUSCLE ROTATION, SARTORIUS AND RECTUS FEMORIS (Right)  APPLICATION, WOUND VAC (Right)    Patient location: ICU    Anesthesia Type: general    Transport from OR: Transported from OR on 6-10 L/min O2 by face mask with adequate spontaneous ventilation. Continuous ECG monitoring in transport. Continuous SpO2 monitoring in transport. Continuos invasive BP monitoring in transport    Post pain: adequate analgesia    Post assessment: no apparent anesthetic complications    Post vital signs: stable    Level of consciousness: alert, awake and oriented    Nausea/Vomiting: no nausea/vomiting    Complications: none    Transfer of care protocol was followed      Last vitals:   Visit Vitals  /67   Pulse 75   Temp 36.8 °C (98.3 °F) (Oral)   Resp (!) 24   Ht 6' 1" (1.854 m)   Wt 90.5 kg (199 lb 8.3 oz)   SpO2 (!) 93%   BMI 26.32 kg/m²     "
95

## 2022-09-27 NOTE — ANESTHESIA PROCEDURE NOTES
Arterial    Diagnosis: aeurysm    Patient location during procedure: done in OR  Procedure start time: 9/27/2022 8:31 AM  Timeout: 9/27/2022 8:30 AM  Procedure end time: 9/27/2022 8:32 AM    Staffing  Authorizing Provider: Fernando Washington MD  Performing Provider: Noah Erickson MD    Anesthesiologist was present at the time of the procedure.    Preanesthetic Checklist  Completed: patient identified, IV checked, site marked, risks and benefits discussed, surgical consent, monitors and equipment checked, pre-op evaluation, timeout performed and anesthesia consent givenArterial  Skin Prep: chlorhexidine gluconate  Local Infiltration: none  Orientation: left  Location: ulnar    Catheter Size: 22 G  Catheter placement by Ultrasound guidance. Heme positive aspiration all ports.   Vessel Caliber: medium, patent, compressibility normal  Vascular Doppler:  not done  Needle advanced into vessel with real time Ultrasound guidance.Insertion Attempts: 1  Assessment  Dressing: secured with tape and tegaderm  Patient: Tolerated well

## 2022-09-27 NOTE — ASSESSMENT & PLAN NOTE
-Was on VA-ECMO at last hospitalization with successful decannulation  -CTA revealed 3 possible new pseudoaneurysms  -INR 3 on admission, since reversed with PO vit K and FFP.  Holding A/C until further instruction from surgical team regarding resumption of A/C.   -Hgb was trending down since admit. Transfusing to keep Hgb > 10  -Cultures from right groin wound and blood done 9/25/with NGTD. Continue empiric Cefepime and Daptomycin (not using Vanc 2/2 recent dye load with CTA)

## 2022-09-28 LAB
ALBUMIN SERPL BCP-MCNC: 2.2 G/DL (ref 3.5–5.2)
ALP SERPL-CCNC: 75 U/L (ref 55–135)
ALT SERPL W/O P-5'-P-CCNC: 15 U/L (ref 10–44)
ANION GAP SERPL CALC-SCNC: 7 MMOL/L (ref 8–16)
AST SERPL-CCNC: 31 U/L (ref 10–40)
BASOPHILS # BLD AUTO: 0.01 K/UL (ref 0–0.2)
BASOPHILS # BLD AUTO: 0.01 K/UL (ref 0–0.2)
BASOPHILS # BLD AUTO: 0.02 K/UL (ref 0–0.2)
BASOPHILS NFR BLD: 0.1 % (ref 0–1.9)
BILIRUB DIRECT SERPL-MCNC: 0.3 MG/DL (ref 0.1–0.3)
BILIRUB SERPL-MCNC: 0.5 MG/DL (ref 0.1–1)
BLD PROD TYP BPU: NORMAL
BLOOD UNIT EXPIRATION DATE: NORMAL
BLOOD UNIT TYPE CODE: 5100
BLOOD UNIT TYPE: NORMAL
BNP SERPL-MCNC: 787 PG/ML (ref 0–99)
BUN SERPL-MCNC: 14 MG/DL (ref 6–20)
CALCIUM SERPL-MCNC: 8.6 MG/DL (ref 8.7–10.5)
CHLORIDE SERPL-SCNC: 105 MMOL/L (ref 95–110)
CO2 SERPL-SCNC: 22 MMOL/L (ref 23–29)
CODING SYSTEM: NORMAL
CREAT SERPL-MCNC: 1.1 MG/DL (ref 0.5–1.4)
DIFFERENTIAL METHOD: ABNORMAL
DISPENSE STATUS: NORMAL
EOSINOPHIL # BLD AUTO: 0 K/UL (ref 0–0.5)
EOSINOPHIL NFR BLD: 0 % (ref 0–8)
EOSINOPHIL NFR BLD: 0 % (ref 0–8)
EOSINOPHIL NFR BLD: 0.1 % (ref 0–8)
ERYTHROCYTE [DISTWIDTH] IN BLOOD BY AUTOMATED COUNT: 15 % (ref 11.5–14.5)
ERYTHROCYTE [DISTWIDTH] IN BLOOD BY AUTOMATED COUNT: 15.1 % (ref 11.5–14.5)
ERYTHROCYTE [DISTWIDTH] IN BLOOD BY AUTOMATED COUNT: 15.1 % (ref 11.5–14.5)
EST. GFR  (NO RACE VARIABLE): >60 ML/MIN/1.73 M^2
GLUCOSE SERPL-MCNC: 149 MG/DL (ref 70–110)
GLUCOSE SERPL-MCNC: 80 MG/DL (ref 70–110)
HCO3 UR-SCNC: 24.5 MMOL/L (ref 24–28)
HCT VFR BLD AUTO: 28 % (ref 40–54)
HCT VFR BLD AUTO: 28.6 % (ref 40–54)
HCT VFR BLD AUTO: 29.9 % (ref 40–54)
HCT VFR BLD CALC: 31 %PCV (ref 36–54)
HGB BLD-MCNC: 8.8 G/DL (ref 14–18)
HGB BLD-MCNC: 8.9 G/DL (ref 14–18)
HGB BLD-MCNC: 9.2 G/DL (ref 14–18)
IMM GRANULOCYTES # BLD AUTO: 0.11 K/UL (ref 0–0.04)
IMM GRANULOCYTES # BLD AUTO: 0.12 K/UL (ref 0–0.04)
IMM GRANULOCYTES # BLD AUTO: 0.14 K/UL (ref 0–0.04)
IMM GRANULOCYTES NFR BLD AUTO: 0.7 % (ref 0–0.5)
IMM GRANULOCYTES NFR BLD AUTO: 0.8 % (ref 0–0.5)
IMM GRANULOCYTES NFR BLD AUTO: 1.1 % (ref 0–0.5)
INR PPP: 1.2 (ref 0.8–1.2)
LDH SERPL L TO P-CCNC: 303 U/L (ref 110–260)
LYMPHOCYTES # BLD AUTO: 0.8 K/UL (ref 1–4.8)
LYMPHOCYTES # BLD AUTO: 0.9 K/UL (ref 1–4.8)
LYMPHOCYTES # BLD AUTO: 0.9 K/UL (ref 1–4.8)
LYMPHOCYTES NFR BLD: 5 % (ref 18–48)
LYMPHOCYTES NFR BLD: 5.8 % (ref 18–48)
LYMPHOCYTES NFR BLD: 6.6 % (ref 18–48)
MAGNESIUM SERPL-MCNC: 1.9 MG/DL (ref 1.6–2.6)
MCH RBC QN AUTO: 27.1 PG (ref 27–31)
MCH RBC QN AUTO: 28 PG (ref 27–31)
MCH RBC QN AUTO: 28.1 PG (ref 27–31)
MCHC RBC AUTO-ENTMCNC: 30.8 G/DL (ref 32–36)
MCHC RBC AUTO-ENTMCNC: 31.1 G/DL (ref 32–36)
MCHC RBC AUTO-ENTMCNC: 31.4 G/DL (ref 32–36)
MCV RBC AUTO: 88 FL (ref 82–98)
MCV RBC AUTO: 90 FL (ref 82–98)
MCV RBC AUTO: 90 FL (ref 82–98)
MONOCYTES # BLD AUTO: 1.2 K/UL (ref 0.3–1)
MONOCYTES # BLD AUTO: 1.5 K/UL (ref 0.3–1)
MONOCYTES # BLD AUTO: 1.5 K/UL (ref 0.3–1)
MONOCYTES NFR BLD: 10.4 % (ref 4–15)
MONOCYTES NFR BLD: 9 % (ref 4–15)
MONOCYTES NFR BLD: 9.3 % (ref 4–15)
NEUTROPHILS # BLD AUTO: 11 K/UL (ref 1.8–7.7)
NEUTROPHILS # BLD AUTO: 12.1 K/UL (ref 1.8–7.7)
NEUTROPHILS # BLD AUTO: 13.8 K/UL (ref 1.8–7.7)
NEUTROPHILS NFR BLD: 82.9 % (ref 38–73)
NEUTROPHILS NFR BLD: 83.2 % (ref 38–73)
NEUTROPHILS NFR BLD: 84.8 % (ref 38–73)
NRBC BLD-RTO: 0 /100 WBC
NUM UNITS TRANS FFP: NORMAL
NUM UNITS TRANS FFP: NORMAL
PCO2 BLDA: 38.4 MMHG (ref 35–45)
PH SMN: 7.41 [PH] (ref 7.35–7.45)
PLATELET # BLD AUTO: 208 K/UL (ref 150–450)
PLATELET # BLD AUTO: 218 K/UL (ref 150–450)
PLATELET # BLD AUTO: 252 K/UL (ref 150–450)
PMV BLD AUTO: 10 FL (ref 9.2–12.9)
PMV BLD AUTO: 10.3 FL (ref 9.2–12.9)
PMV BLD AUTO: 9.8 FL (ref 9.2–12.9)
PO2 BLDA: 354 MMHG (ref 80–100)
POC ACTIVATED CLOTTING TIME K: 155 SEC (ref 74–137)
POC ACTIVATED CLOTTING TIME K: 260 SEC (ref 74–137)
POC ACTIVATED CLOTTING TIME K: 283 SEC (ref 74–137)
POC BE: 0 MMOL/L
POC IONIZED CALCIUM: 1.26 MMOL/L (ref 1.06–1.42)
POC SATURATED O2: 100 % (ref 95–100)
POC TCO2: 26 MMOL/L (ref 23–27)
POTASSIUM BLD-SCNC: 3.3 MMOL/L (ref 3.5–5.1)
POTASSIUM SERPL-SCNC: 4.9 MMOL/L (ref 3.5–5.1)
PROT SERPL-MCNC: 5.5 G/DL (ref 6–8.4)
PROTHROMBIN TIME: 12.3 SEC (ref 9–12.5)
RBC # BLD AUTO: 3.13 M/UL (ref 4.6–6.2)
RBC # BLD AUTO: 3.18 M/UL (ref 4.6–6.2)
RBC # BLD AUTO: 3.4 M/UL (ref 4.6–6.2)
SAMPLE: ABNORMAL
SODIUM BLD-SCNC: 137 MMOL/L (ref 136–145)
SODIUM SERPL-SCNC: 134 MMOL/L (ref 136–145)
TRANS ERYTHROCYTES VOL PATIENT: NORMAL ML
WBC # BLD AUTO: 13.22 K/UL (ref 3.9–12.7)
WBC # BLD AUTO: 14.56 K/UL (ref 3.9–12.7)
WBC # BLD AUTO: 16.2 K/UL (ref 3.9–12.7)

## 2022-09-28 PROCEDURE — 25000003 PHARM REV CODE 250: Performed by: STUDENT IN AN ORGANIZED HEALTH CARE EDUCATION/TRAINING PROGRAM

## 2022-09-28 PROCEDURE — 25000003 PHARM REV CODE 250: Performed by: HOSPITALIST

## 2022-09-28 PROCEDURE — 63600175 PHARM REV CODE 636 W HCPCS: Performed by: STUDENT IN AN ORGANIZED HEALTH CARE EDUCATION/TRAINING PROGRAM

## 2022-09-28 PROCEDURE — 25000003 PHARM REV CODE 250: Performed by: INTERNAL MEDICINE

## 2022-09-28 PROCEDURE — 80076 HEPATIC FUNCTION PANEL: CPT | Performed by: NURSE PRACTITIONER

## 2022-09-28 PROCEDURE — 85610 PROTHROMBIN TIME: CPT | Performed by: STUDENT IN AN ORGANIZED HEALTH CARE EDUCATION/TRAINING PROGRAM

## 2022-09-28 PROCEDURE — 83880 ASSAY OF NATRIURETIC PEPTIDE: CPT | Performed by: NURSE PRACTITIONER

## 2022-09-28 PROCEDURE — 63600175 PHARM REV CODE 636 W HCPCS: Performed by: HOSPITALIST

## 2022-09-28 PROCEDURE — 99233 PR SUBSEQUENT HOSPITAL CARE,LEVL III: ICD-10-PCS | Mod: ,,, | Performed by: INTERNAL MEDICINE

## 2022-09-28 PROCEDURE — 27000248 HC VAD-ADDITIONAL DAY

## 2022-09-28 PROCEDURE — 94761 N-INVAS EAR/PLS OXIMETRY MLT: CPT

## 2022-09-28 PROCEDURE — 20600001 HC STEP DOWN PRIVATE ROOM

## 2022-09-28 PROCEDURE — 27000207 HC ISOLATION

## 2022-09-28 PROCEDURE — 83615 LACTATE (LD) (LDH) ENZYME: CPT | Performed by: NURSE PRACTITIONER

## 2022-09-28 PROCEDURE — 85025 COMPLETE CBC W/AUTO DIFF WBC: CPT | Mod: 91 | Performed by: NURSE PRACTITIONER

## 2022-09-28 PROCEDURE — 83735 ASSAY OF MAGNESIUM: CPT | Performed by: NURSE PRACTITIONER

## 2022-09-28 PROCEDURE — 99233 PR SUBSEQUENT HOSPITAL CARE,LEVL III: ICD-10-PCS | Mod: ,,, | Performed by: PHYSICIAN ASSISTANT

## 2022-09-28 PROCEDURE — 99233 SBSQ HOSP IP/OBS HIGH 50: CPT | Mod: ,,, | Performed by: PHYSICIAN ASSISTANT

## 2022-09-28 PROCEDURE — 99233 PR SUBSEQUENT HOSPITAL CARE,LEVL III: ICD-10-PCS | Mod: ,,, | Performed by: STUDENT IN AN ORGANIZED HEALTH CARE EDUCATION/TRAINING PROGRAM

## 2022-09-28 PROCEDURE — 99233 SBSQ HOSP IP/OBS HIGH 50: CPT | Mod: ,,, | Performed by: INTERNAL MEDICINE

## 2022-09-28 PROCEDURE — 93750 INTERROGATION VAD IN PERSON: CPT | Mod: ,,, | Performed by: INTERNAL MEDICINE

## 2022-09-28 PROCEDURE — 99900026 HC AIRWAY MAINTENANCE (STAT)

## 2022-09-28 PROCEDURE — 93750 PR INTERROGATE VENT ASSIST DEV, IN PERSON, W PHYSICIAN ANALYSIS: ICD-10-PCS | Mod: ,,, | Performed by: INTERNAL MEDICINE

## 2022-09-28 PROCEDURE — 80048 BASIC METABOLIC PNL TOTAL CA: CPT | Performed by: NURSE PRACTITIONER

## 2022-09-28 PROCEDURE — 63600175 PHARM REV CODE 636 W HCPCS: Performed by: PHYSICIAN ASSISTANT

## 2022-09-28 PROCEDURE — 25000003 PHARM REV CODE 250: Performed by: PHYSICIAN ASSISTANT

## 2022-09-28 PROCEDURE — 99900035 HC TECH TIME PER 15 MIN (STAT)

## 2022-09-28 PROCEDURE — 99233 SBSQ HOSP IP/OBS HIGH 50: CPT | Mod: ,,, | Performed by: STUDENT IN AN ORGANIZED HEALTH CARE EDUCATION/TRAINING PROGRAM

## 2022-09-28 PROCEDURE — 25000003 PHARM REV CODE 250: Performed by: NURSE PRACTITIONER

## 2022-09-28 RX ORDER — ACETAMINOPHEN 500 MG
1000 TABLET ORAL 3 TIMES DAILY
Status: DISCONTINUED | OUTPATIENT
Start: 2022-09-28 | End: 2022-10-05 | Stop reason: HOSPADM

## 2022-09-28 RX ORDER — OXYCODONE HYDROCHLORIDE 10 MG/1
10 TABLET ORAL EVERY 4 HOURS PRN
Status: DISCONTINUED | OUTPATIENT
Start: 2022-09-28 | End: 2022-09-29

## 2022-09-28 RX ORDER — ENOXAPARIN SODIUM 100 MG/ML
40 INJECTION SUBCUTANEOUS ONCE
Status: COMPLETED | OUTPATIENT
Start: 2022-09-28 | End: 2022-09-28

## 2022-09-28 RX ORDER — LANOLIN ALCOHOL/MO/W.PET/CERES
400 CREAM (GRAM) TOPICAL ONCE
Status: COMPLETED | OUTPATIENT
Start: 2022-09-28 | End: 2022-09-28

## 2022-09-28 RX ORDER — MORPHINE SULFATE 2 MG/ML
1 INJECTION, SOLUTION INTRAMUSCULAR; INTRAVENOUS ONCE
Status: COMPLETED | OUTPATIENT
Start: 2022-09-28 | End: 2022-09-28

## 2022-09-28 RX ADMIN — ACETAMINOPHEN 1000 MG: 500 TABLET ORAL at 08:09

## 2022-09-28 RX ADMIN — OXYCODONE HYDROCHLORIDE 10 MG: 10 TABLET ORAL at 03:09

## 2022-09-28 RX ADMIN — AMIODARONE HYDROCHLORIDE 400 MG: 200 TABLET ORAL at 12:09

## 2022-09-28 RX ADMIN — MORPHINE SULFATE 1 MG: 2 INJECTION, SOLUTION INTRAMUSCULAR; INTRAVENOUS at 04:09

## 2022-09-28 RX ADMIN — OXYCODONE HYDROCHLORIDE 10 MG: 10 TABLET ORAL at 08:09

## 2022-09-28 RX ADMIN — OXYCODONE HYDROCHLORIDE AND ACETAMINOPHEN 1 TABLET: 10; 325 TABLET ORAL at 04:09

## 2022-09-28 RX ADMIN — MEXILETINE HYDROCHLORIDE 250 MG: 250 CAPSULE ORAL at 08:09

## 2022-09-28 RX ADMIN — DAPTOMYCIN 540 MG: 350 INJECTION, POWDER, LYOPHILIZED, FOR SOLUTION INTRAVENOUS at 03:09

## 2022-09-28 RX ADMIN — MEROPENEM 2 G: 1 INJECTION INTRAVENOUS at 04:09

## 2022-09-28 RX ADMIN — MEROPENEM 2 G: 1 INJECTION INTRAVENOUS at 12:09

## 2022-09-28 RX ADMIN — MEROPENEM 2 G: 1 INJECTION INTRAVENOUS at 09:09

## 2022-09-28 RX ADMIN — Medication 400 MG: at 12:09

## 2022-09-28 RX ADMIN — MEXILETINE HYDROCHLORIDE 250 MG: 250 CAPSULE ORAL at 03:09

## 2022-09-28 RX ADMIN — PANTOPRAZOLE SODIUM 40 MG: 40 TABLET, DELAYED RELEASE ORAL at 12:09

## 2022-09-28 RX ADMIN — ACETAMINOPHEN 1000 MG: 500 TABLET ORAL at 12:09

## 2022-09-28 RX ADMIN — MIRTAZAPINE 30 MG: 30 TABLET, FILM COATED ORAL at 08:09

## 2022-09-28 RX ADMIN — ALPRAZOLAM 1 MG: 0.5 TABLET ORAL at 08:09

## 2022-09-28 RX ADMIN — Medication 400 MG: at 05:09

## 2022-09-28 RX ADMIN — ENOXAPARIN SODIUM 40 MG: 100 INJECTION SUBCUTANEOUS at 05:09

## 2022-09-28 RX ADMIN — MEXILETINE HYDROCHLORIDE 250 MG: 250 CAPSULE ORAL at 05:09

## 2022-09-28 RX ADMIN — LEVOTHYROXINE SODIUM 50 MCG: 50 TABLET ORAL at 05:09

## 2022-09-28 NOTE — ASSESSMENT & PLAN NOTE
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 CTA with new dilatation of R fem artery. Superficial wound cx with ESBL Ecoli. Blcx unrevealing. S/p OR with vascular/plastics for R groin exploration, creation of R ileofem bypass (end-end) with rif soaked dacron graft and creation of muscle flap - OR cx with GNR thus far - full op note pending; brief op note with purulent seen.    Recommendations:  -continue empiric daptomycin and meropenem pending OR cx   -monitor for toxicities CK weekly   -anticipate prolonged course of IV abx   -follow up cx

## 2022-09-28 NOTE — NURSING TRANSFER
Nursing Transfer Note      9/28/2022     Reason patient is being transferred: stepdown    Transfer To: 315    Transfer via bed    Transfer with cardiac monitoring, VAD    Transported by RNs    Medicines sent: n/a    Any special needs or follow-up needed: n/a    Chart send with patient: Yes    Notified: spouse

## 2022-09-28 NOTE — PROGRESS NOTES
[facility] - Cardiac Intensive Care  Vascular Surgery  Progress Note    Patient Name: Tim Richards  MRN: [id number]  Admission Date: 9/24/2022  Primary Care Provider: Deyanira Booth MD    Subjective:     Interval History: Pt went for rt groin wound exploration, rt ileofemoral artery bypass, and a muscle flap yesterday. Overnight patient had issues with wound vac, as it may have been clogged. Plastics is aware and will address it. Otherwise AF and stable. Pain is well controlled.     Post-Op Info:  Procedure(s) (LRB):  CREATION, BYPASS, ARTERIAL, ILIAC TO FEMORAL WITH GRAFT (Right)  CREATION, FLAP, MUSCLE ROTATION, SARTORIUS AND RECTUS FEMORIS (Right)  APPLICATION, WOUND VAC (Right)   1 Day Post-Op       Medications:  Continuous Infusions:  Scheduled Meds:   amiodarone  400 mg Oral Daily    ceFEPime (MAXIPIME) IVPB  2 g Intravenous Q8H    DAPTOmycin (CUBICIN) IV (PEDS and ADULTS)  6 mg/kg Intravenous Q24H    levothyroxine  50 mcg Oral Before breakfast    magnesium oxide  400 mg Oral Daily with lunch    mexiletine  250 mg Oral Q8H    mirtazapine  30 mg Oral QHS    pantoprazole  40 mg Oral Daily with lunch    polyethylene glycol  17 g Oral Daily     PRN Meds:sodium chloride, sodium chloride, sodium chloride, sodium chloride, sodium chloride, sodium chloride 0.9%, sodium chloride 0.9%, ALPRAZolam, hydrALAZINE, zolpidem     Objective:     Vital Signs (Most Recent):  Temp: 98.2 °F (36.8 °C) (09/26/22 0301)  Pulse: 86 (09/26/22 0600)  Resp: (!) 22 (09/26/22 0600)  BP: (!) 76/0 (09/26/22 0600)  SpO2: 95 % (09/26/22 0600)   Vital Signs (24h Range):  Temp:  [97.8 °F (36.6 °C)-98.3 °F (36.8 °C)] 98.2 °F (36.8 °C)  Pulse:  [] 86  Resp:  [10-33] 22  SpO2:  [81 %-99 %] 95 %  BP: (76-90)/(0) 76/0         Physical Exam  Constitutional:       Appearance: Normal appearance.   HENT:      Head: Normocephalic and atraumatic.      Mouth/Throat:      Mouth: Mucous membranes are moist.   Eyes:      Pupils: Pupils are  equal, round, and reactive to light.   Cardiovascular:      Rate and Rhythm: Regular rhythm.   Abdominal:      General: Abdomen is flat.      Palpations: Abdomen is soft.   Musculoskeletal:      Comments: 5/5 strength all 4 extremities, palpable PT and DP pulses bilaterally.   Right groin area with wound vac and CLEO drain   Neurological:      Mental Status: He is alert.       Significant Labs:  ABGs: No results for input(s): PH, PCO2, PO2, HCO3, POCSATURATED, BE in the last 48 hours.  Cardiac markers: No results for input(s): CKMB, CPKMB, TROPONINT, TROPONINI, MYOGLOBIN in the last 48 hours.  CBC:   Recent Labs   Lab 09/28/22  0742   WBC 14.56*   RBC 3.13*   HGB 8.8*   HCT 28.0*      MCV 90   MCH 28.1   MCHC 31.4*     CMP:   Recent Labs   Lab 09/28/22  0332   GLU 80   CALCIUM 8.6*   ALBUMIN 2.2*   PROT 5.5*   *   K 4.9   CO2 22*      BUN 14   CREATININE 1.1   ALKPHOS 75   ALT 15   AST 31   BILITOT 0.5     Coagulation:   Recent Labs   Lab 09/28/22  0332   LABPROT 12.3   INR 1.2     Lactic Acid: No results for input(s): LACTATE in the last 48 hours.  All pertinent labs from the last 24 hours have been reviewed.    Significant Diagnostics:  I have reviewed all pertinent imaging results/findings within the past 24 hours.    Assessment/Plan:     * Pseudoaneurysm of right femoral artery  Tim Richards is a 56 y.o. 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 Vascular surgery has been consulted for evaluation of right groin site concerning for pseudoaneurym. Patient is now s/p Rt groin exploration, creation of Right Ileofemoral bypass, and creation of muscle flap 9/27/22    Recommend:  - Infectious Disease consultation for recommendations as patient will require extensive abx therapy.  - Recommend starting picc line so patient may continue to  receive abx infusions even after discharge  - Wound culture sent for anaerobic, aerobic, and fungal   - preliminary growth of gram negative jodie  - Broad-spectrum antibiotics  - Strict bedrest, head of bed less than 30°, keep right leg straight  - Blood cultures x2: NGTD  - Ok to restart anticoagulation from vascular perspective  - Anticoagulation per primary team            Jc Sal MD  Vascular Surgery  [facility] - Cardiac Intensive Care

## 2022-09-28 NOTE — ANESTHESIA POSTPROCEDURE EVALUATION
Anesthesia Post Evaluation    Patient: Tim Richards    Procedure(s) Performed: Procedure(s) (LRB):  CREATION, BYPASS, ARTERIAL, ILIAC TO FEMORAL WITH GRAFT (Right)  CREATION, FLAP, MUSCLE ROTATION, SARTORIUS AND RECTUS FEMORIS (Right)  APPLICATION, WOUND VAC (Right)    Final Anesthesia Type: general      Patient location during evaluation: ICU  Patient participation: Yes- Able to Participate  Level of consciousness: awake and alert, awake and oriented  Post-procedure vital signs: reviewed and stable  Pain management: adequate  Airway patency: patent    PONV status at discharge: No PONV  Anesthetic complications: no      Cardiovascular status: blood pressure returned to baseline, hemodynamically stable and stable  Respiratory status: unassisted, spontaneous ventilation and room air  Hydration status: euvolemic  Follow-up not needed.          Vitals Value Taken Time   /75 09/28/22 0003   Temp 36.7 °C (98.1 °F) 09/28/22 0701   Pulse 79 09/28/22 0728   Resp 22 09/28/22 0728   SpO2 82 % 09/28/22 0711   Vitals shown include unvalidated device data.      No case tracking events are documented in the log.      Pain/Iker Score: Pain Rating Prior to Med Admin: 8 (9/28/2022  4:15 AM)  Pain Rating Post Med Admin: 5 (9/28/2022  4:45 AM)

## 2022-09-28 NOTE — ASSESSMENT & PLAN NOTE
-Was on VA-ECMO at last hospitalization with successful decannulation  -CTA revealed 3 possible new pseudoaneurysms  -INR 3 on admission, since reversed with PO vit K and FFP.  Holding A/C until further instruction from surgical team regarding resumption of A/C.   -Hgb was trending down since admit. Transfusing to keep Hgb > 10  -Cultures from right groin wound with ESBL E Coli, Continue Daptomycin and meropenem.

## 2022-09-28 NOTE — ASSESSMENT & PLAN NOTE
Tim Richards is a 56 y.o. 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 Vascular surgery has been consulted for evaluation of right groin site concerning for pseudoaneurym. Patient is now s/p Rt groin exploration, creation of Right Ileofemoral bypass, and creation of muscle flap 9/27/22    Recommend:  - Infectious Disease consultation for recommendations as patient will require extensive abx therapy.  - Recommend starting picc line so patient may continue to receive abx infusions even after discharge  - Wound culture sent for anaerobic, aerobic, and fungal   - preliminary growth of gram negative jodie  - Broad-spectrum antibiotics  - Strict bedrest, head of bed less than 30°, keep right leg straight  - Blood cultures x2: NGTD  - Ok to restart anticoagulation from vascular perspective  - Anticoagulation per primary team

## 2022-09-28 NOTE — PROGRESS NOTES
09/28/2022  Fitz Christianson    Current provider:  Antonio Hadley Jr.,*    Device interrogation:  TXP LVAD INTERROGATIONS 9/28/2022 9/28/2022 9/28/2022 9/28/2022 9/28/2022 9/28/2022 9/28/2022   Type HeartMate3 HeartMate3 HeartMate3 HeartMate3 HeartMate3 HeartMate3 HeartMate3   Flow 5.4 5.4 5.4 5.5 5.5 5.5 5.5   Speed 6400 6400 6400 6400 6400 6400 6400   PI 2.3 2.7 2.5 2.4 2.5 2.3 2.3   Power ([place]) 5.5 5.5 5.6 5.5 5.5 5.5 5.5   LSL 6000 6000 6000 6000 6000 6000 6000   Low Flow Alarm - - - - - - -   High Power Alarm - - - - - - -   Pulsatility Intermittent pulse Intermittent pulse Intermittent pulse Intermittent pulse Intermittent pulse Intermittent pulse Intermittent pulse          Rounded on Tim Richards to ensure all mechanical assist device settings (IABP or VAD) were appropriate and all parameters were within limits.  I was able to ensure all back up equipment was present, the staff had no issues, and the Perfusion Department daily rounding was complete.      For implantable VADs: Interrogation of Ventricular assist device was performed with analysis of device parameters and review of device function. I have personally reviewed the interrogation findings and agree with findings as stated.     In emergency, the nursing units have been notified to contact the perfusion department either by:  Calling [id number] from 630am to 4pm Mon thru Fri, utilizing the On-Call Finder functionality of Epic and searching for Perfusion, or by contacting the hospital  from 4pm to 630am and on weekends and asking to speak with the perfusionist on call.    8:06 AM

## 2022-09-28 NOTE — PROGRESS NOTES
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 CTA with new dilatation of R fem artery. Superficial wound cx with ESBL Ecoli. Blcx unrevealing. S/p OR with vascular/plastics for R groin exploration, creation of R ileofem bypass (end-end) with rif soaked dacron graft and creation of muscle flap - OR cx with GNR thus far - full op note pending; brief op note with purulent seen.    Recommendations:  -continue empiric daptomycin and meropenem pending OR cx   -monitor for toxicities CK weekly   -anticipate prolonged course of IV abx   -follow up cx        Mycotic aneurysm  See pseudoaneurysm    Groin hematoma  See pseudoaneurysm            Thank you for your consult. I will follow-up with patient. Please contact us if you have any additional questions. Above d/w primary team and vascular team.     Time: 35 minutes   50% of time spent on face-to-face counseling and coordination of care. Counseling included review of test results, diagnosis, and treatment plan with patient and/or family.        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 Pt reported that he was seen in [facility] clinic on 9/22 and was noted to have purulent drainage. He was given a course of doxycycline. Admission notable for CTA with new dilatation of R fem artery in the region of prior soft tissue density in R groin from prior ECMO cannulation site. Surgical disposition pending. Blcx ngtd and wound cultures from R groin in process. Pt is currently on doxycycline.     Interval History: S/p OR yesterday - OR cx with GNR thus far. No fevers documented overnight. Feeling well today, tolerating abx without issues.       Review of Systems   Constitutional:  Negative for chills and fever.   All other systems reviewed and are negative.  Objective:     Vital Signs (Most Recent):  Temp: 98.1 °F (36.7 °C) (09/28/22 0701)  Pulse: 79 (09/28/22 0901)  Resp: (!) 24 (09/28/22 0901)  BP: 108/75 (09/28/22 0000)  SpO2: (!) 93 % (09/28/22 0650)   Vital Signs (24h Range):  Temp:  [96.5 °F (35.8 °C)-98.7 °F (37.1 °C)] 98.1 °F (36.7 °C)  Pulse:  [21-82] 79  Resp:  [19-35] 24  SpO2:  [74 %-94 %] 93 %  BP: (102-108)/(75-79) 108/75  Arterial Line BP: (64-89)/(56-75) 80/69     Weight: 90 kg (198 lb 6.6 oz)  Body mass index is 26.18 kg/m².    Estimated Creatinine Clearance: 85.8 mL/min (based on SCr of 1.1 mg/dL).    Physical Exam  Constitutional:       General: He is not in acute distress.     Appearance: He is not ill-appearing or toxic-appearing.   HENT:      Head: Normocephalic and atraumatic.      Right Ear: External ear normal.      Left Ear: External ear normal.      Mouth/Throat:      Mouth: Mucous membranes are moist.      Pharynx: No oropharyngeal exudate or posterior oropharyngeal erythema.   Eyes:      General: No scleral icterus.        Left eye: No discharge.   Cardiovascular:      Comments: vad  Pulmonary:      Effort: Pulmonary effort is normal. No respiratory distress.      Breath sounds: No stridor. No wheezing or rhonchi.   Abdominal:      General:  There is no distension.      Tenderness: There is no abdominal tenderness. There is no guarding.      Comments: LVAD - dressed  Prior LVAD site - dressed, some bloody drainage noted on dressing   Musculoskeletal:      Right lower leg: No edema.      Left lower leg: No edema.   Skin:     General: Skin is warm and dry.      Findings: No erythema.      Comments: R groin - wound vac  Rleg surgical site dressed  R drain present   Neurological:      Mental Status: He is alert and oriented to person, place, and time. Mental status is at baseline.      Motor: No weakness.   Psychiatric:         Mood and Affect: Mood normal.         Behavior: Behavior normal.       Significant Labs:   Microbiology Results (last 7 days)       Procedure Component Value Units Date/Time    Aerobic culture [[id number]]  (Abnormal) Collected: 09/27/22 0944    Order Status: Completed Specimen: Wound from Groin Updated: 09/28/22 0926     Aerobic Bacterial Culture GRAM NEGATIVE LAURA  Rare  Identification and susceptibility pending      Narrative:      2. RIGHT FEMORAL ARTERY    Culture, Anaerobe [[id number]] Collected: 09/27/22 0944    Order Status: Completed Specimen: Wound from Groin Updated: 09/28/22 0738     Anaerobic Culture Culture in progress    Narrative:      3. RIGHT FEMORAL GRAFT    Culture, Anaerobe [[id number]] Collected: 09/27/22 0944    Order Status: Completed Specimen: Wound from Groin Updated: 09/28/22 0738     Anaerobic Culture Culture in progress    Narrative:      4. FEMORAL TISSUE RIGHT GROIN    Culture, Anaerobe [[id number]] Collected: 09/27/22 0944    Order Status: Completed Specimen: Wound from Groin Updated: 09/28/22 0738     Anaerobic Culture Culture in progress    Narrative:      2. RIGHT FEMORAL ARTERY    Culture, Anaerobe [[id number]] Collected: 09/27/22 0944    Order Status: Completed Specimen: Wound from Groin Updated: 09/28/22 0738     Anaerobic Culture Culture in progress    Narrative:      1. RIGHT GROIN WOUND    Blood  culture [[id number]] Collected: 09/25/22 0133    Order Status: Completed Specimen: Blood from Peripheral, Lower Arm, Left Updated: 09/28/22 0612     Blood Culture, Routine No Growth to date      No Growth to date      No Growth to date      No Growth to date    Narrative:      Collection has been rescheduled by TR3 at 09/25/2022 00:31 Reason: Pt   dont wanna get stuck rn rachille  Collection has been rescheduled by TR3 at 09/25/2022 00:31 Reason: Pt   dont wanna get stuck rn rachille    Blood culture [[id number]] Collected: 09/25/22 0138    Order Status: Completed Specimen: Blood from Peripheral, Hand, Right Updated: 09/28/22 0612     Blood Culture, Routine No Growth to date      No Growth to date      No Growth to date      No Growth to date    Narrative:      Collection has been rescheduled by TR3 at 09/25/2022 00:31 Reason: Pt   dont wanna get stuck rn rachille  Collection has been rescheduled by TR3 at 09/25/2022 00:31 Reason: Pt   dont wanna get stuck rn rachille    Gram stain [[id number]] Collected: 09/27/22 0944    Order Status: Completed Specimen: Wound from Groin Updated: 09/27/22 1312     Gram Stain Result Few WBC's      No organisms seen    Narrative:      1. RIGHT GROIN WOUND    Gram stain [[id number]] Collected: 09/27/22 0944    Order Status: Completed Specimen: Wound from Groin Updated: 09/27/22 1204     Gram Stain Result No WBC's      No organisms seen    Narrative:      2. RIGHT FEMORAL ARTERY    Gram stain [[id number]] Collected: 09/27/22 0944    Order Status: Completed Specimen: Wound from Groin Updated: 09/27/22 1203     Gram Stain Result No WBC's      No organisms seen    Narrative:      3. RIGHT FEMORAL GRAFT    Gram stain [[id number]] Collected: 09/27/22 0944    Order Status: Completed Specimen: Wound from Groin Updated: 09/27/22 1202     Gram Stain Result No WBC's      No organisms seen    Narrative:      4. FEMORAL TISSUE RIGHT GROIN    Culture, Anaerobe [[id number]] Collected: 09/25/22 0815     Order Status: Completed Specimen: Wound from Groin Updated: 09/27/22 1129     Anaerobic Culture Culture in progress    Aerobic culture [[id number]] Collected: 09/27/22 0944    Order Status: Sent Specimen: Wound from Groin Updated: 09/27/22 1036    AFB Culture & Smear [[id number]] Collected: 09/27/22 0944    Order Status: Sent Specimen: Wound from Groin Updated: 09/27/22 1036    Fungus culture [[id number]] Collected: 09/27/22 0944    Order Status: Sent Specimen: Wound from Groin Updated: 09/27/22 1036    AFB Culture & Smear [[id number]] Collected: 09/27/22 0944    Order Status: Sent Specimen: Wound from Groin Updated: 09/27/22 1035    Fungus culture [[id number]] Collected: 09/27/22 0944    Order Status: Sent Specimen: Wound from Groin Updated: 09/27/22 1034    Aerobic culture [[id number]] Collected: 09/27/22 0944    Order Status: Sent Specimen: Wound from Groin Updated: 09/27/22 1034    AFB Culture & Smear [[id number]] Collected: 09/27/22 0944    Order Status: Sent Specimen: Wound from Groin Updated: 09/27/22 1032    Fungus culture [[id number]] Collected: 09/27/22 0944    Order Status: Sent Specimen: Wound from Groin Updated: 09/27/22 1032    Aerobic culture [[id number]] Collected: 09/27/22 0944    Order Status: Sent Specimen: Wound from Groin Updated: 09/27/22 1031    Fungus culture [[id number]] Collected: 09/27/22 0944    Order Status: Sent Specimen: Wound from Groin Updated: 09/27/22 1029    AFB Culture & Smear [[id number]] Collected: 09/27/22 0944    Order Status: Sent Specimen: Wound from Groin Updated: 09/27/22 1029    Aerobic culture [[id number]]  (Abnormal)  (Susceptibility) Collected: 09/25/22 0815    Order Status: Completed Specimen: Wound from Groin Updated: 09/27/22 0945     Aerobic Bacterial Culture ESCHERICHIA COLI ESBL  Few      Fungus culture [[id number]] Collected: 09/25/22 0815    Order Status: Completed Specimen: Wound from Groin Updated: 09/26/22 1150     Fungus (Mycology) Culture Culture in progress    Blood  culture [[id number]]     Order Status: Canceled Specimen: Blood     Blood culture [[id number]]     Order Status: Canceled Specimen: Blood             Significant Imaging: I have reviewed all pertinent imaging results/findings within the past 24 hours.

## 2022-09-28 NOTE — SUBJECTIVE & OBJECTIVE
Interval History: S/p OR yesterday - OR cx with GNR thus far. No fevers documented overnight. Feeling well today, tolerating abx without issues.       Review of Systems   Constitutional:  Negative for chills and fever.   All other systems reviewed and are negative.  Objective:     Vital Signs (Most Recent):  Temp: 98.1 °F (36.7 °C) (09/28/22 0701)  Pulse: 79 (09/28/22 0901)  Resp: (!) 24 (09/28/22 0901)  BP: 108/75 (09/28/22 0000)  SpO2: (!) 93 % (09/28/22 0650)   Vital Signs (24h Range):  Temp:  [96.5 °F (35.8 °C)-98.7 °F (37.1 °C)] 98.1 °F (36.7 °C)  Pulse:  [21-82] 79  Resp:  [19-35] 24  SpO2:  [74 %-94 %] 93 %  BP: (102-108)/(75-79) 108/75  Arterial Line BP: (64-89)/(56-75) 80/69     Weight: 90 kg (198 lb 6.6 oz)  Body mass index is 26.18 kg/m².    Estimated Creatinine Clearance: 85.8 mL/min (based on SCr of 1.1 mg/dL).    Physical Exam  Constitutional:       General: He is not in acute distress.     Appearance: He is not ill-appearing or toxic-appearing.   HENT:      Head: Normocephalic and atraumatic.      Right Ear: External ear normal.      Left Ear: External ear normal.      Mouth/Throat:      Mouth: Mucous membranes are moist.      Pharynx: No oropharyngeal exudate or posterior oropharyngeal erythema.   Eyes:      General: No scleral icterus.        Left eye: No discharge.   Cardiovascular:      Comments: vad  Pulmonary:      Effort: Pulmonary effort is normal. No respiratory distress.      Breath sounds: No stridor. No wheezing or rhonchi.   Abdominal:      General: There is no distension.      Tenderness: There is no abdominal tenderness. There is no guarding.      Comments: LVAD - dressed  Prior LVAD site - dressed, some bloody drainage noted on dressing   Musculoskeletal:      Right lower leg: No edema.      Left lower leg: No edema.   Skin:     General: Skin is warm and dry.      Findings: No erythema.      Comments: R groin - wound vac  Rleg surgical site dressed  R drain present   Neurological:       Mental Status: He is alert and oriented to person, place, and time. Mental status is at baseline.      Motor: No weakness.   Psychiatric:         Mood and Affect: Mood normal.         Behavior: Behavior normal.       Significant Labs:   Microbiology Results (last 7 days)       Procedure Component Value Units Date/Time    Aerobic culture [[id number]]  (Abnormal) Collected: 09/27/22 0944    Order Status: Completed Specimen: Wound from Groin Updated: 09/28/22 0926     Aerobic Bacterial Culture GRAM NEGATIVE LAURA  Rare  Identification and susceptibility pending      Narrative:      2. RIGHT FEMORAL ARTERY    Culture, Anaerobe [[id number]] Collected: 09/27/22 0944    Order Status: Completed Specimen: Wound from Groin Updated: 09/28/22 0738     Anaerobic Culture Culture in progress    Narrative:      3. RIGHT FEMORAL GRAFT    Culture, Anaerobe [[id number]] Collected: 09/27/22 0944    Order Status: Completed Specimen: Wound from Groin Updated: 09/28/22 0738     Anaerobic Culture Culture in progress    Narrative:      4. FEMORAL TISSUE RIGHT GROIN    Culture, Anaerobe [[id number]] Collected: 09/27/22 0944    Order Status: Completed Specimen: Wound from Groin Updated: 09/28/22 0738     Anaerobic Culture Culture in progress    Narrative:      2. RIGHT FEMORAL ARTERY    Culture, Anaerobe [[id number]] Collected: 09/27/22 0944    Order Status: Completed Specimen: Wound from Groin Updated: 09/28/22 0738     Anaerobic Culture Culture in progress    Narrative:      1. RIGHT GROIN WOUND    Blood culture [[id number]] Collected: 09/25/22 0133    Order Status: Completed Specimen: Blood from Peripheral, Lower Arm, Left Updated: 09/28/22 0612     Blood Culture, Routine No Growth to date      No Growth to date      No Growth to date      No Growth to date    Narrative:      Collection has been rescheduled by TR3 at 09/25/2022 00:31 Reason: Pt   dont wanna get stuck beni tabor  Collection has been rescheduled by TR3 at 09/25/2022 00:31 Reason:  Pt   salena mejias get stuck beni tabor    Blood culture [[id number]] Collected: 09/25/22 0138    Order Status: Completed Specimen: Blood from Peripheral, Hand, Right Updated: 09/28/22 0612     Blood Culture, Routine No Growth to date      No Growth to date      No Growth to date      No Growth to date    Narrative:      Collection has been rescheduled by TR3 at 09/25/2022 00:31 Reason: Pt   dont randell get stuck beni tabor  Collection has been rescheduled by TR3 at 09/25/2022 00:31 Reason: Pt   dont wanna get stuck beni tabor    Gram stain [[id number]] Collected: 09/27/22 0944    Order Status: Completed Specimen: Wound from Groin Updated: 09/27/22 1312     Gram Stain Result Few WBC's      No organisms seen    Narrative:      1. RIGHT GROIN WOUND    Gram stain [[id number]] Collected: 09/27/22 0944    Order Status: Completed Specimen: Wound from Groin Updated: 09/27/22 1204     Gram Stain Result No WBC's      No organisms seen    Narrative:      2. RIGHT FEMORAL ARTERY    Gram stain [[id number]] Collected: 09/27/22 0944    Order Status: Completed Specimen: Wound from Groin Updated: 09/27/22 1203     Gram Stain Result No WBC's      No organisms seen    Narrative:      3. RIGHT FEMORAL GRAFT    Gram stain [[id number]] Collected: 09/27/22 0944    Order Status: Completed Specimen: Wound from Groin Updated: 09/27/22 1202     Gram Stain Result No WBC's      No organisms seen    Narrative:      4. FEMORAL TISSUE RIGHT GROIN    Culture, Anaerobe [[id number]] Collected: 09/25/22 0815    Order Status: Completed Specimen: Wound from Groin Updated: 09/27/22 1129     Anaerobic Culture Culture in progress    Aerobic culture [[id number]] Collected: 09/27/22 0944    Order Status: Sent Specimen: Wound from Groin Updated: 09/27/22 1036    AFB Culture & Smear [[id number]] Collected: 09/27/22 0944    Order Status: Sent Specimen: Wound from Groin Updated: 09/27/22 1036    Fungus culture [[id number]] Collected: 09/27/22 0944    Order Status: Sent  Specimen: Wound from Groin Updated: 09/27/22 1036    AFB Culture & Smear [[id number]] Collected: 09/27/22 0944    Order Status: Sent Specimen: Wound from Groin Updated: 09/27/22 1035    Fungus culture [[id number]] Collected: 09/27/22 0944    Order Status: Sent Specimen: Wound from Groin Updated: 09/27/22 1034    Aerobic culture [[id number]] Collected: 09/27/22 0944    Order Status: Sent Specimen: Wound from Groin Updated: 09/27/22 1034    AFB Culture & Smear [[id number]] Collected: 09/27/22 0944    Order Status: Sent Specimen: Wound from Groin Updated: 09/27/22 1032    Fungus culture [[id number]] Collected: 09/27/22 0944    Order Status: Sent Specimen: Wound from Groin Updated: 09/27/22 1032    Aerobic culture [[id number]] Collected: 09/27/22 0944    Order Status: Sent Specimen: Wound from Groin Updated: 09/27/22 1031    Fungus culture [[id number]] Collected: 09/27/22 0944    Order Status: Sent Specimen: Wound from Groin Updated: 09/27/22 1029    AFB Culture & Smear [[id number]] Collected: 09/27/22 0944    Order Status: Sent Specimen: Wound from Groin Updated: 09/27/22 1029    Aerobic culture [[id number]]  (Abnormal)  (Susceptibility) Collected: 09/25/22 0815    Order Status: Completed Specimen: Wound from Groin Updated: 09/27/22 0945     Aerobic Bacterial Culture ESCHERICHIA COLI ESBL  Few      Fungus culture [[id number]] Collected: 09/25/22 0815    Order Status: Completed Specimen: Wound from Groin Updated: 09/26/22 1150     Fungus (Mycology) Culture Culture in progress    Blood culture [[id number]]     Order Status: Canceled Specimen: Blood     Blood culture [[id number]]     Order Status: Canceled Specimen: Blood             Significant Imaging: I have reviewed all pertinent imaging results/findings within the past 24 hours.

## 2022-09-28 NOTE — SUBJECTIVE & OBJECTIVE
**Interval History: Post op day 1 s/p ileofemoral bypass and muscle flap. Stable MAPs in the 70s off of pressors. Issues with the wound vac overnight alarming with blockage message. No complaints aside from postop pain. Stepping down from ICU today.     Continuous Infusions:   NORepinephrine bitartrate-D5W Stopped (09/27/22 1534)     Scheduled Meds:   acetaminophen  1,000 mg Oral TID    amiodarone  400 mg Oral Daily    DAPTOmycin (CUBICIN) IV (PEDS and ADULTS)  6 mg/kg Intravenous Q24H    enoxaparin  40 mg Subcutaneous Once    levothyroxine  50 mcg Oral Before breakfast    magnesium oxide  400 mg Oral Daily with lunch    meropenem (MERREM) IVPB  2 g Intravenous Q8H    mexiletine  250 mg Oral Q8H    mirtazapine  30 mg Oral QHS    pantoprazole  40 mg Oral Daily with lunch    polyethylene glycol  17 g Oral Daily     PRN Meds:sodium chloride, sodium chloride, sodium chloride 0.9%, sodium chloride 0.9%, ALPRAZolam, oxyCODONE, oxyCODONE, zolpidem    Review of patient's allergies indicates:   Allergen Reactions    Lisinopril Anaphylaxis    Hydralazine analogues      Chronic constipation, impotence, dizziness     Objective:     Vital Signs (Most Recent):  Temp: 98.1 °F (36.7 °C) (09/28/22 0701)  Pulse: 81 (09/28/22 1301)  Resp: (!) 27 (09/28/22 1301)  BP: (!) 134/58 (09/28/22 1201)  SpO2: (!) 93 % (09/28/22 1222)   Vital Signs (24h Range):  Temp:  [97.8 °F (36.6 °C)-98.7 °F (37.1 °C)] 98.1 °F (36.7 °C)  Pulse:  [21-82] 81  Resp:  [19-35] 27  SpO2:  [74 %-94 %] 93 %  BP: (102-134)/(58-78) 134/58  Arterial Line BP: (67-89)/(58-75) 73/64     Patient Vitals for the past 72 hrs (Last 3 readings):   Weight   09/28/22 0301 90 kg (198 lb 6.6 oz)   09/27/22 0400 90.5 kg (199 lb 8.3 oz)       Body mass index is 26.18 kg/m².      Intake/Output Summary (Last 24 hours) at 9/28/2022 1415  Last data filed at 9/28/2022 1301  Gross per 24 hour   Intake 1334.6 ml   Output 912.5 ml   Net 422.1 ml         Hemodynamic Parameters:        Telemetry: SR    Physical Exam  Constitutional:       Appearance: Normal appearance.   HENT:      Head: Normocephalic and atraumatic.   Eyes:      Extraocular Movements: Extraocular movements intact.      Pupils: Pupils are equal, round, and reactive to light.   Neck:      Comments: Neck veins are not elevated  Cardiovascular:      Rate and Rhythm: Normal rate and regular rhythm.      Comments: Smooth VAD hum  Pulmonary:      Effort: Pulmonary effort is normal.      Breath sounds: Normal breath sounds.   Abdominal:      General: Bowel sounds are normal.      Palpations: Abdomen is soft.   Musculoskeletal:         General: No swelling. Normal range of motion.      Cervical back: Normal range of motion and neck supple.      Right lower leg: No edema.      Left lower leg: No edema.      Comments: R groin with wound vac and CLEO drain. No drainage or bleeding.    Skin:     General: Skin is warm and dry.      Capillary Refill: Capillary refill takes 2 to 3 seconds.   Neurological:      General: No focal deficit present.      Mental Status: He is alert and oriented to person, place, and time.   Psychiatric:         Mood and Affect: Mood normal.         Behavior: Behavior normal.         Thought Content: Thought content normal.         Judgment: Judgment normal.       Significant Labs:  CBC:  Recent Labs   Lab 09/27/22  1543 09/28/22  0009 09/28/22  0742   WBC 12.20 13.22* 14.56*   RBC 3.59* 3.40* 3.13*   HGB 10.0* 9.2* 8.8*   HCT 31.7* 29.9* 28.0*    252 218   MCV 88 88 90   MCH 27.9 27.1 28.1   MCHC 31.5* 30.8* 31.4*       BNP:  Recent Labs   Lab 09/24/22  1803 09/26/22  0353 09/28/22  0332   * 97 787*       CMP:  Recent Labs   Lab 09/26/22  0353 09/27/22  0300 09/28/22  0332   * 69* 80   CALCIUM 8.3* 8.6* 8.6*   ALBUMIN 2.2* 2.4* 2.2*   PROT 5.7* 6.1 5.5*    135* 134*   K 3.8 4.0 4.9   CO2 24 23 22*    105 105   BUN 12 10 14   CREATININE 1.6* 1.4 1.1   ALKPHOS 81 84 75   ALT 16 19 15    AST 28 36 31   BILITOT 0.5 0.6 0.5        Coagulation:   Recent Labs   Lab 09/26/22  1702 09/27/22  0300 09/28/22  0332   INR 1.5* 1.3* 1.2       LDH:  Recent Labs   Lab 09/26/22  0353 09/27/22  0300 09/28/22  0332   * 424* 303*       Microbiology:  Microbiology Results (last 7 days)       Procedure Component Value Units Date/Time    Aerobic culture [[id number]]  (Abnormal) Collected: 09/27/22 0944    Order Status: Completed Specimen: Wound from Groin Updated: 09/28/22 1025     Aerobic Bacterial Culture GRAM NEGATIVE LAURA  Rare  Identification and susceptibility pending      Narrative:      4. FEMORAL TISSUE RIGHT GROIN    Aerobic culture [[id number]]  (Abnormal) Collected: 09/27/22 0944    Order Status: Completed Specimen: Wound from Groin Updated: 09/28/22 1024     Aerobic Bacterial Culture GRAM NEGATIVE LAURA  Rare  Identification and susceptibility pending      Narrative:      3. RIGHT FEMORAL GRAFT    Aerobic culture [[id number]]  (Abnormal) Collected: 09/27/22 0944    Order Status: Completed Specimen: Wound from Groin Updated: 09/28/22 1009     Aerobic Bacterial Culture GRAM NEGATIVE LAURA  Moderate  Identification and susceptibility pending      Narrative:      1. RIGHT GROIN WOUND    Aerobic culture [[id number]]  (Abnormal) Collected: 09/27/22 0944    Order Status: Completed Specimen: Wound from Groin Updated: 09/28/22 0926     Aerobic Bacterial Culture GRAM NEGATIVE LAURA  Rare  Identification and susceptibility pending      Narrative:      2. RIGHT FEMORAL ARTERY    Culture, Anaerobe [[id number]] Collected: 09/27/22 0944    Order Status: Completed Specimen: Wound from Groin Updated: 09/28/22 0738     Anaerobic Culture Culture in progress    Narrative:      3. RIGHT FEMORAL GRAFT    Culture, Anaerobe [[id number]] Collected: 09/27/22 0944    Order Status: Completed Specimen: Wound from Groin Updated: 09/28/22 0738     Anaerobic Culture Culture in progress    Narrative:      4. FEMORAL TISSUE RIGHT GROIN     Culture, Anaerobe [[id number]] Collected: 09/27/22 0944    Order Status: Completed Specimen: Wound from Groin Updated: 09/28/22 0738     Anaerobic Culture Culture in progress    Narrative:      2. RIGHT FEMORAL ARTERY    Culture, Anaerobe [[id number]] Collected: 09/27/22 0944    Order Status: Completed Specimen: Wound from Groin Updated: 09/28/22 0738     Anaerobic Culture Culture in progress    Narrative:      1. RIGHT GROIN WOUND    Blood culture [[id number]] Collected: 09/25/22 0133    Order Status: Completed Specimen: Blood from Peripheral, Lower Arm, Left Updated: 09/28/22 0612     Blood Culture, Routine No Growth to date      No Growth to date      No Growth to date      No Growth to date    Narrative:      Collection has been rescheduled by TR3 at 09/25/2022 00:31 Reason: Pt   dont wanna get stuck rn bernardalle  Collection has been rescheduled by TR3 at 09/25/2022 00:31 Reason: Pt   dont wanna get stuck rn rachille    Blood culture [[id number]] Collected: 09/25/22 0138    Order Status: Completed Specimen: Blood from Peripheral, Hand, Right Updated: 09/28/22 0612     Blood Culture, Routine No Growth to date      No Growth to date      No Growth to date      No Growth to date    Narrative:      Collection has been rescheduled by TR3 at 09/25/2022 00:31 Reason: Pt   dont wanna get stuck rn bernardalle  Collection has been rescheduled by TR3 at 09/25/2022 00:31 Reason: Pt   dont wanna get stuck rn irahille    Gram stain [[id number]] Collected: 09/27/22 0944    Order Status: Completed Specimen: Wound from Groin Updated: 09/27/22 1312     Gram Stain Result Few WBC's      No organisms seen    Narrative:      1. RIGHT GROIN WOUND    Gram stain [[id number]] Collected: 09/27/22 0944    Order Status: Completed Specimen: Wound from Groin Updated: 09/27/22 1204     Gram Stain Result No WBC's      No organisms seen    Narrative:      2. RIGHT FEMORAL ARTERY    Gram stain [[id number]] Collected: 09/27/22 0944    Order Status: Completed  Specimen: Wound from Groin Updated: 09/27/22 1203     Gram Stain Result No WBC's      No organisms seen    Narrative:      3. RIGHT FEMORAL GRAFT    Gram stain [[id number]] Collected: 09/27/22 0944    Order Status: Completed Specimen: Wound from Groin Updated: 09/27/22 1202     Gram Stain Result No WBC's      No organisms seen    Narrative:      4. FEMORAL TISSUE RIGHT GROIN    Culture, Anaerobe [[id number]] Collected: 09/25/22 0815    Order Status: Completed Specimen: Wound from Groin Updated: 09/27/22 1129     Anaerobic Culture Culture in progress    AFB Culture & Smear [[id number]] Collected: 09/27/22 0944    Order Status: Sent Specimen: Wound from Groin Updated: 09/27/22 1036    Fungus culture [[id number]] Collected: 09/27/22 0944    Order Status: Sent Specimen: Wound from Groin Updated: 09/27/22 1036    AFB Culture & Smear [[id number]] Collected: 09/27/22 0944    Order Status: Sent Specimen: Wound from Groin Updated: 09/27/22 1035    Fungus culture [[id number]] Collected: 09/27/22 0944    Order Status: Sent Specimen: Wound from Groin Updated: 09/27/22 1034    AFB Culture & Smear [[id number]] Collected: 09/27/22 0944    Order Status: Sent Specimen: Wound from Groin Updated: 09/27/22 1032    Fungus culture [[id number]] Collected: 09/27/22 0944    Order Status: Sent Specimen: Wound from Groin Updated: 09/27/22 1032    Fungus culture [[id number]] Collected: 09/27/22 0944    Order Status: Sent Specimen: Wound from Groin Updated: 09/27/22 1029    AFB Culture & Smear [[id number]] Collected: 09/27/22 0944    Order Status: Sent Specimen: Wound from Groin Updated: 09/27/22 1029    Aerobic culture [[id number]]  (Abnormal)  (Susceptibility) Collected: 09/25/22 0815    Order Status: Completed Specimen: Wound from Groin Updated: 09/27/22 0945     Aerobic Bacterial Culture ESCHERICHIA COLI ESBL  Few      Fungus culture [[id number]] Collected: 09/25/22 0815    Order Status: Completed Specimen: Wound from Groin Updated: 09/26/22 1151      Fungus (Mycology) Culture Culture in progress    Blood culture [[id number]]     Order Status: Canceled Specimen: Blood     Blood culture [[id number]]     Order Status: Canceled Specimen: Blood             I have reviewed all pertinent labs within the past 24 hours.    Estimated Creatinine Clearance: 85.8 mL/min (based on SCr of 1.1 mg/dL).    Diagnostic Results:  I have reviewed and interpreted all pertinent imaging results/findings within the past 24 hours.

## 2022-09-28 NOTE — PLAN OF CARE
Pt wound vac alarming for a blockage sensed. On-call surgical team paged. MD to bedside.  Changed wound vac canister and wound vas stopped alarming. Alarms resumed a few hours after. Team paged. Plan for surgical team to change dressing today.   Minimal drainage from woundvac. CLEO with 10ml out total throughout night. Pt does report pain at graft site on R leg. PRN pain medications given  as scheduled. One time dose of morphine given for break through pain. Pulses found with doppler. No VAD alarms. Pt NSR on monitor. Normotensive BP. Not requiring any pressor support. 20-30ml/hour urinary output. Dr. Jackson aware. Pt has poor PO intake. Trach capped, pt on room air.  Mag replaced this morning.         Problem: Adult Inpatient Plan of Care  Goal: Plan of Care Review  Outcome: Ongoing, Progressing  Goal: Optimal Comfort and Wellbeing  Outcome: Ongoing, Progressing     Problem: Infection (Ventricular Assist Device)  Goal: Absence of Infection Signs and Symptoms  Outcome: Ongoing, Progressing

## 2022-09-28 NOTE — ASSESSMENT & PLAN NOTE
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 Will get lovenox DVT prophylaxis tonight and resume heparin and coumadin tomorrow.   -LDH is stable    Procedure: Device Interrogation Including analysis of device parameters  Current Settings: Ventricular Assist Device  Review of device function is stable    TXP LVAD INTERROGATIONS 9/28/2022 9/28/2022 9/28/2022 9/28/2022 9/28/2022 9/28/2022 9/28/2022   Type HeartMate3 HeartMate3 HeartMate3 HeartMate3 HeartMate3 HeartMate3 HeartMate3   Flow 5.5 5.6 5.5 5.5 5.6 5.6 5.4   Speed 6400 6400 6400 6400 6400 6400 6400   PI 2.5 2 2.7 2.7 2 1.8 2.3   Power (Jackson) 5.5 5.5 5.6 5.5 5.6 5.5 5.5   LSL - - - - - - 6000   Low Flow Alarm - - - - - - -   High Power Alarm - - - - - - -   Pulsatility - - - - - - Intermittent pulse

## 2022-09-28 NOTE — NURSING
VAD speed set at 6400 since coming back from OR. HTS fellow, Dr. Ayers updated. VAD coordinator, Jack, called and updated as well.

## 2022-09-28 NOTE — NURSING
Patient arrived to [facility] room 315. Patient is AOX4 and VS stable upon arrival. Report received from Sandra DIAZ. Patient oriented to room and call bell use. Patient arrived with LVAD equipment. Patient has no complaints and no questions at this time. Will continue to monitor.

## 2022-09-28 NOTE — PROGRESS NOTES
[facility]  Heart Transplant  Progress Note    Patient Name: Tim Richards  MRN: [id number]  Admission Date: 9/24/2022  Hospital Length of Stay: 4 days  Attending Physician: Antonio Hadley Jr.,*  Primary Care Provider: Deyanira Booth MD  Principal Problem:Pseudoaneurysm of right femoral artery    Subjective:     **Interval History: Post op day 1 s/p ileofemoral bypass and muscle flap. Stable MAPs in the 70s off of pressors. Issues with the wound vac overnight alarming with blockage message. No complaints aside from postop pain. Stepping down from ICU today.     Continuous Infusions:   NORepinephrine bitartrate-D5W Stopped (09/27/22 1534)     Scheduled Meds:   acetaminophen  1,000 mg Oral TID    amiodarone  400 mg Oral Daily    DAPTOmycin (CUBICIN) IV (PEDS and ADULTS)  6 mg/kg Intravenous Q24H    enoxaparin  40 mg Subcutaneous Once    levothyroxine  50 mcg Oral Before breakfast    magnesium oxide  400 mg Oral Daily with lunch    meropenem (MERREM) IVPB  2 g Intravenous Q8H    mexiletine  250 mg Oral Q8H    mirtazapine  30 mg Oral QHS    pantoprazole  40 mg Oral Daily with lunch    polyethylene glycol  17 g Oral Daily     PRN Meds:sodium chloride, sodium chloride, sodium chloride 0.9%, sodium chloride 0.9%, ALPRAZolam, oxyCODONE, oxyCODONE, zolpidem    Review of patient's allergies indicates:   Allergen Reactions    Lisinopril Anaphylaxis    Hydralazine analogues      Chronic constipation, impotence, dizziness     Objective:     Vital Signs (Most Recent):  Temp: 98.1 °F (36.7 °C) (09/28/22 0701)  Pulse: 81 (09/28/22 1301)  Resp: (!) 27 (09/28/22 1301)  BP: (!) 134/58 (09/28/22 1201)  SpO2: (!) 93 % (09/28/22 1222)   Vital Signs (24h Range):  Temp:  [97.8 °F (36.6 °C)-98.7 °F (37.1 °C)] 98.1 °F (36.7 °C)  Pulse:  [21-82] 81  Resp:  [19-35] 27  SpO2:  [74 %-94 %] 93 %  BP: (102-134)/(58-78) 134/58  Arterial Line BP: (67-89)/(58-75) 73/64     Patient Vitals for the past 72  hrs (Last 3 readings):   Weight   09/28/22 0301 90 kg (198 lb 6.6 oz)   09/27/22 0400 90.5 kg (199 lb 8.3 oz)       Body mass index is 26.18 kg/m².      Intake/Output Summary (Last 24 hours) at 9/28/2022 1415  Last data filed at 9/28/2022 1301  Gross per 24 hour   Intake 1334.6 ml   Output 912.5 ml   Net 422.1 ml         Hemodynamic Parameters:       Telemetry: SR    Physical Exam  Constitutional:       Appearance: Normal appearance.   HENT:      Head: Normocephalic and atraumatic.   Eyes:      Extraocular Movements: Extraocular movements intact.      Pupils: Pupils are equal, round, and reactive to light.   Neck:      Comments: Neck veins are not elevated  Cardiovascular:      Rate and Rhythm: Normal rate and regular rhythm.      Comments: Smooth VAD hum  Pulmonary:      Effort: Pulmonary effort is normal.      Breath sounds: Normal breath sounds.   Abdominal:      General: Bowel sounds are normal.      Palpations: Abdomen is soft.   Musculoskeletal:         General: No swelling. Normal range of motion.      Cervical back: Normal range of motion and neck supple.      Right lower leg: No edema.      Left lower leg: No edema.      Comments: R groin with wound vac and CLEO drain. No drainage or bleeding.    Skin:     General: Skin is warm and dry.      Capillary Refill: Capillary refill takes 2 to 3 seconds.   Neurological:      General: No focal deficit present.      Mental Status: He is alert and oriented to person, place, and time.   Psychiatric:         Mood and Affect: Mood normal.         Behavior: Behavior normal.         Thought Content: Thought content normal.         Judgment: Judgment normal.       Significant Labs:  CBC:  Recent Labs   Lab 09/27/22  1543 09/28/22  0009 09/28/22  0742   WBC 12.20 13.22* 14.56*   RBC 3.59* 3.40* 3.13*   HGB 10.0* 9.2* 8.8*   HCT 31.7* 29.9* 28.0*    252 218   MCV 88 88 90   MCH 27.9 27.1 28.1   MCHC 31.5* 30.8* 31.4*       BNP:  Recent Labs   Lab 09/24/22  1803  09/26/22  0353 09/28/22  0332   * 97 787*       CMP:  Recent Labs   Lab 09/26/22  0353 09/27/22  0300 09/28/22  0332   * 69* 80   CALCIUM 8.3* 8.6* 8.6*   ALBUMIN 2.2* 2.4* 2.2*   PROT 5.7* 6.1 5.5*    135* 134*   K 3.8 4.0 4.9   CO2 24 23 22*    105 105   BUN 12 10 14   CREATININE 1.6* 1.4 1.1   ALKPHOS 81 84 75   ALT 16 19 15   AST 28 36 31   BILITOT 0.5 0.6 0.5        Coagulation:   Recent Labs   Lab 09/26/22  1702 09/27/22  0300 09/28/22  0332   INR 1.5* 1.3* 1.2       LDH:  Recent Labs   Lab 09/26/22  0353 09/27/22  0300 09/28/22  0332   * 424* 303*       Microbiology:  Microbiology Results (last 7 days)       Procedure Component Value Units Date/Time    Aerobic culture [[id number]]  (Abnormal) Collected: 09/27/22 0944    Order Status: Completed Specimen: Wound from Groin Updated: 09/28/22 1025     Aerobic Bacterial Culture GRAM NEGATIVE LAURA  Rare  Identification and susceptibility pending      Narrative:      4. FEMORAL TISSUE RIGHT GROIN    Aerobic culture [[id number]]  (Abnormal) Collected: 09/27/22 0944    Order Status: Completed Specimen: Wound from Groin Updated: 09/28/22 1024     Aerobic Bacterial Culture GRAM NEGATIVE LAURA  Rare  Identification and susceptibility pending      Narrative:      3. RIGHT FEMORAL GRAFT    Aerobic culture [[id number]]  (Abnormal) Collected: 09/27/22 0944    Order Status: Completed Specimen: Wound from Groin Updated: 09/28/22 1009     Aerobic Bacterial Culture GRAM NEGATIVE LAURA  Moderate  Identification and susceptibility pending      Narrative:      1. RIGHT GROIN WOUND    Aerobic culture [[id number]]  (Abnormal) Collected: 09/27/22 0944    Order Status: Completed Specimen: Wound from Groin Updated: 09/28/22 0926     Aerobic Bacterial Culture GRAM NEGATIVE LAURA  Rare  Identification and susceptibility pending      Narrative:      2. RIGHT FEMORAL ARTERY    Culture, Anaerobe [[id number]] Collected: 09/27/22 0944    Order Status: Completed Specimen:  Wound from Groin Updated: 09/28/22 0738     Anaerobic Culture Culture in progress    Narrative:      3. RIGHT FEMORAL GRAFT    Culture, Anaerobe [[id number]] Collected: 09/27/22 0944    Order Status: Completed Specimen: Wound from Groin Updated: 09/28/22 0738     Anaerobic Culture Culture in progress    Narrative:      4. FEMORAL TISSUE RIGHT GROIN    Culture, Anaerobe [[id number]] Collected: 09/27/22 0944    Order Status: Completed Specimen: Wound from Groin Updated: 09/28/22 0738     Anaerobic Culture Culture in progress    Narrative:      2. RIGHT FEMORAL ARTERY    Culture, Anaerobe [[id number]] Collected: 09/27/22 0944    Order Status: Completed Specimen: Wound from Groin Updated: 09/28/22 0738     Anaerobic Culture Culture in progress    Narrative:      1. RIGHT GROIN WOUND    Blood culture [[id number]] Collected: 09/25/22 0133    Order Status: Completed Specimen: Blood from Peripheral, Lower Arm, Left Updated: 09/28/22 0612     Blood Culture, Routine No Growth to date      No Growth to date      No Growth to date      No Growth to date    Narrative:      Collection has been rescheduled by TR3 at 09/25/2022 00:31 Reason: Pt   dont wanna get stuck rn rachille  Collection has been rescheduled by TR3 at 09/25/2022 00:31 Reason: Pt   dont wanna get stuck rn rachille    Blood culture [[id number]] Collected: 09/25/22 0138    Order Status: Completed Specimen: Blood from Peripheral, Hand, Right Updated: 09/28/22 0612     Blood Culture, Routine No Growth to date      No Growth to date      No Growth to date      No Growth to date    Narrative:      Collection has been rescheduled by TR3 at 09/25/2022 00:31 Reason: Pt   dont wanna get stuck rn rachille  Collection has been rescheduled by TR3 at 09/25/2022 00:31 Reason: Pt   dont wanna get stuck rn rachille    Gram stain [[id number]] Collected: 09/27/22 0944    Order Status: Completed Specimen: Wound from Groin Updated: 09/27/22 1312     Gram Stain Result Few WBC's      No  organisms seen    Narrative:      1. RIGHT GROIN WOUND    Gram stain [[id number]] Collected: 09/27/22 0944    Order Status: Completed Specimen: Wound from Groin Updated: 09/27/22 1204     Gram Stain Result No WBC's      No organisms seen    Narrative:      2. RIGHT FEMORAL ARTERY    Gram stain [[id number]] Collected: 09/27/22 0944    Order Status: Completed Specimen: Wound from Groin Updated: 09/27/22 1203     Gram Stain Result No WBC's      No organisms seen    Narrative:      3. RIGHT FEMORAL GRAFT    Gram stain [[id number]] Collected: 09/27/22 0944    Order Status: Completed Specimen: Wound from Groin Updated: 09/27/22 1202     Gram Stain Result No WBC's      No organisms seen    Narrative:      4. FEMORAL TISSUE RIGHT GROIN    Culture, Anaerobe [[id number]] Collected: 09/25/22 0815    Order Status: Completed Specimen: Wound from Groin Updated: 09/27/22 1129     Anaerobic Culture Culture in progress    AFB Culture & Smear [[id number]] Collected: 09/27/22 0944    Order Status: Sent Specimen: Wound from Groin Updated: 09/27/22 1036    Fungus culture [[id number]] Collected: 09/27/22 0944    Order Status: Sent Specimen: Wound from Groin Updated: 09/27/22 1036    AFB Culture & Smear [[id number]] Collected: 09/27/22 0944    Order Status: Sent Specimen: Wound from Groin Updated: 09/27/22 1035    Fungus culture [[id number]] Collected: 09/27/22 0944    Order Status: Sent Specimen: Wound from Groin Updated: 09/27/22 1034    AFB Culture & Smear [[id number]] Collected: 09/27/22 0944    Order Status: Sent Specimen: Wound from Groin Updated: 09/27/22 1032    Fungus culture [[id number]] Collected: 09/27/22 0944    Order Status: Sent Specimen: Wound from Groin Updated: 09/27/22 1032    Fungus culture [[id number]] Collected: 09/27/22 0944    Order Status: Sent Specimen: Wound from Groin Updated: 09/27/22 1029    AFB Culture & Smear [[id number]] Collected: 09/27/22 0944    Order Status: Sent Specimen: Wound from Groin Updated: 09/27/22 1029     Aerobic culture [[id number]]  (Abnormal)  (Susceptibility) Collected: 09/25/22 0815    Order Status: Completed Specimen: Wound from Groin Updated: 09/27/22 0945     Aerobic Bacterial Culture ESCHERICHIA COLI ESBL  Few      Fungus culture [[id number]] Collected: 09/25/22 0815    Order Status: Completed Specimen: Wound from Groin Updated: 09/26/22 1150     Fungus (Mycology) Culture Culture in progress    Blood culture [[id number]]     Order Status: Canceled Specimen: Blood     Blood culture [[id number]]     Order Status: Canceled Specimen: Blood             I have reviewed all pertinent labs within the past 24 hours.    Estimated Creatinine Clearance: 85.8 mL/min (based on SCr of 1.1 mg/dL).    Diagnostic Results:  I have reviewed and interpreted all pertinent imaging results/findings within the past 24 hours.    Assessment and Plan:     Tim Richards is a 56 y.o.  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  His post operative course was complicated by cardiogenic shock/RV failure requiring VA-ECMO.  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  He has a 8 Fr cuffed Shiley trach.  He had a prolonged and complicated hospital course from 6/24/22 - 9/4/22, was discharged to rehab (see clinic visit from 9/22/22 for additional details).  Of note he was last seen in clinic by Dr. Ragsdale on 9/22/22 after being discharged from rehab and at the time was noted to have purulence from the groin site and was placed on doxycycline PO.  He has had complaints of fatigue since his last hospitalization, per his wife.  This morning he was sitting and was crossing his legs for a prolonged period of time, and when he uncrossed them he noticed a pool of blood under him and active bleeding from his R groin where his previous  ECMO cannula was.  He began to feel lightheaded when he tried walking.  Per wife he had 2 low flow alarms at home.      In the ED, patient stated he felt at his baseline and had no complaints.  No active bleeding at the time of my evaluation from R groin site.  Doppler BP was 80/0.  Hgb dropped from 9.4 -> 8.9 over the past 2 days.  INR was 3.0.  CT Angio performed per CV surgery recommendations.  HTS was consulted for admission, CT surgery was consulted in the ED for evaluation of groin hematoma.       Cardiovascular Studies  TTE 8/30/22   There is an LVAD present. Base speed is 6300 RPMs. The pump type is a Heartmate III. The interventricular septum appears midline. The aortic valve does not open.   The left ventricle is severely enlarged with eccentric hypertrophy and severely decreased systolic function.   The estimated ejection fraction is 10%.   There is left ventricular global hypokinesis.   Left ventricular diastolic dysfunction.   There is trivial continuous aortic regurgitation.   There is abnormal septal wall motion.   The right ventricle is poorly seen, but appears enlarged with reduced systolic function.   Mild tricuspid regurgitation.      * Pseudoaneurysm of right femoral artery  Vascular surgery consulted, appreciate recommendations  CTA shows 3 new pseudoaneurysms of R femoral artery  S/p  Right Ileofemoral bypass (end to end). Distal External Iliac to distal femoral artery    Groin hematoma  -Was on VA-ECMO at last hospitalization with successful decannulation  -CTA revealed 3 possible new pseudoaneurysms  -INR 3 on admission, since reversed with PO vit K and FFP.  Holding A/C until further instruction from surgical team regarding resumption of A/C.   -Hgb was trending down since admit. Transfusing to keep Hgb > 10  -Cultures from right groin wound with ESBL E Coli, Continue Daptomycin and meropenem.     VT (ventricular tachycardia)  -Patient has [employer] SICD  -Currently on amiodarone  400mg qd and mexiletine 250mg TID, will continue     LVAD (left ventricular assist device) present  -S/P S/P DT HM2 3/8/2018 then S/P DT HM3 exchange 7/13/2022 2/2 to thrombosis of HM2 thought caused by COVID PNA  -Current speed 6300  -Last TTE done 8/30/2022 at 6300: LVEDD 7.44, LVEF 10%, RV appears enlarged and decreased, trace AI, trace MR, mild TR, PAS > 28, IVC 3, AV does not open, septum midline  -A/C on hold following pseudoaneuryism repair on 9/27. Will get lovenox DVT prophylaxis tonight and resume heparin and coumadin tomorrow.   -LDH is stable    Procedure: Device Interrogation Including analysis of device parameters  Current Settings: Ventricular Assist Device  Review of device function is stable    TXP LVAD INTERROGATIONS 9/28/2022 9/28/2022 9/28/2022 9/28/2022 9/28/2022 9/28/2022 9/28/2022   Type HeartMate3 HeartMate3 HeartMate3 HeartMate3 HeartMate3 HeartMate3 HeartMate3   Flow 5.5 5.6 5.5 5.5 5.6 5.6 5.4   Speed 6400 6400 6400 6400 6400 6400 6400   PI 2.5 2 2.7 2.7 2 1.8 2.3   Power ([place]) 5.5 5.5 5.6 5.5 5.6 5.5 5.5   LSL - - - - - - 6000   Low Flow Alarm - - - - - - -   High Power Alarm - - - - - - -   Pulsatility - - - - - - Intermittent pulse         Chronic combined systolic and diastolic congestive heart failure  Currently appears clinically euvolemic  Maintain K>4.0, Mg>2.0        Yadiel Coley PA-C  Heart Transplant  [facility]

## 2022-09-29 LAB
ALBUMIN SERPL BCP-MCNC: 2.2 G/DL (ref 3.5–5.2)
ALP SERPL-CCNC: 87 U/L (ref 55–135)
ALT SERPL W/O P-5'-P-CCNC: 17 U/L (ref 10–44)
ANION GAP SERPL CALC-SCNC: 5 MMOL/L (ref 8–16)
APTT BLDCRRT: 26.6 SEC (ref 21–32)
APTT BLDCRRT: 32.4 SEC (ref 21–32)
APTT BLDCRRT: 39 SEC (ref 21–32)
APTT BLDCRRT: 39.9 SEC (ref 21–32)
AST SERPL-CCNC: 27 U/L (ref 10–40)
BACTERIA SPEC AEROBE CULT: ABNORMAL
BACTERIA SPEC ANAEROBE CULT: NORMAL
BASOPHILS # BLD AUTO: 0.02 K/UL (ref 0–0.2)
BASOPHILS # BLD AUTO: 0.03 K/UL (ref 0–0.2)
BASOPHILS # BLD AUTO: 0.05 K/UL (ref 0–0.2)
BASOPHILS NFR BLD: 0.1 % (ref 0–1.9)
BASOPHILS NFR BLD: 0.3 % (ref 0–1.9)
BASOPHILS NFR BLD: 0.4 % (ref 0–1.9)
BILIRUB DIRECT SERPL-MCNC: 0.4 MG/DL (ref 0.1–0.3)
BILIRUB SERPL-MCNC: 0.6 MG/DL (ref 0.1–1)
BUN SERPL-MCNC: 14 MG/DL (ref 6–20)
CALCIUM SERPL-MCNC: 9 MG/DL (ref 8.7–10.5)
CHLORIDE SERPL-SCNC: 102 MMOL/L (ref 95–110)
CO2 SERPL-SCNC: 24 MMOL/L (ref 23–29)
CREAT SERPL-MCNC: 1.2 MG/DL (ref 0.5–1.4)
DIFFERENTIAL METHOD: ABNORMAL
EOSINOPHIL # BLD AUTO: 0.1 K/UL (ref 0–0.5)
EOSINOPHIL # BLD AUTO: 0.2 K/UL (ref 0–0.5)
EOSINOPHIL # BLD AUTO: 0.2 K/UL (ref 0–0.5)
EOSINOPHIL NFR BLD: 0.6 % (ref 0–8)
EOSINOPHIL NFR BLD: 1.2 % (ref 0–8)
EOSINOPHIL NFR BLD: 1.7 % (ref 0–8)
ERYTHROCYTE [DISTWIDTH] IN BLOOD BY AUTOMATED COUNT: 14.9 % (ref 11.5–14.5)
ERYTHROCYTE [DISTWIDTH] IN BLOOD BY AUTOMATED COUNT: 14.9 % (ref 11.5–14.5)
ERYTHROCYTE [DISTWIDTH] IN BLOOD BY AUTOMATED COUNT: 15.1 % (ref 11.5–14.5)
EST. GFR  (NO RACE VARIABLE): >60 ML/MIN/1.73 M^2
GLUCOSE SERPL-MCNC: 62 MG/DL (ref 70–110)
HCT VFR BLD AUTO: 29.1 % (ref 40–54)
HCT VFR BLD AUTO: 30.9 % (ref 40–54)
HCT VFR BLD AUTO: 33.4 % (ref 40–54)
HGB BLD-MCNC: 8.8 G/DL (ref 14–18)
HGB BLD-MCNC: 9.1 G/DL (ref 14–18)
HGB BLD-MCNC: 9.8 G/DL (ref 14–18)
IMM GRANULOCYTES # BLD AUTO: 0.11 K/UL (ref 0–0.04)
IMM GRANULOCYTES # BLD AUTO: 0.14 K/UL (ref 0–0.04)
IMM GRANULOCYTES # BLD AUTO: 0.16 K/UL (ref 0–0.04)
IMM GRANULOCYTES NFR BLD AUTO: 0.8 % (ref 0–0.5)
IMM GRANULOCYTES NFR BLD AUTO: 1.1 % (ref 0–0.5)
IMM GRANULOCYTES NFR BLD AUTO: 1.2 % (ref 0–0.5)
INR PPP: 1.1 (ref 0.8–1.2)
LDH SERPL L TO P-CCNC: 308 U/L (ref 110–260)
LYMPHOCYTES # BLD AUTO: 0.9 K/UL (ref 1–4.8)
LYMPHOCYTES # BLD AUTO: 0.9 K/UL (ref 1–4.8)
LYMPHOCYTES # BLD AUTO: 1.1 K/UL (ref 1–4.8)
LYMPHOCYTES NFR BLD: 6.8 % (ref 18–48)
LYMPHOCYTES NFR BLD: 7.1 % (ref 18–48)
LYMPHOCYTES NFR BLD: 7.2 % (ref 18–48)
MAGNESIUM SERPL-MCNC: 2 MG/DL (ref 1.6–2.6)
MCH RBC QN AUTO: 27 PG (ref 27–31)
MCH RBC QN AUTO: 27.5 PG (ref 27–31)
MCH RBC QN AUTO: 27.6 PG (ref 27–31)
MCHC RBC AUTO-ENTMCNC: 29.3 G/DL (ref 32–36)
MCHC RBC AUTO-ENTMCNC: 29.4 G/DL (ref 32–36)
MCHC RBC AUTO-ENTMCNC: 30.2 G/DL (ref 32–36)
MCV RBC AUTO: 91 FL (ref 82–98)
MCV RBC AUTO: 92 FL (ref 82–98)
MCV RBC AUTO: 94 FL (ref 82–98)
MONOCYTES # BLD AUTO: 1.2 K/UL (ref 0.3–1)
MONOCYTES # BLD AUTO: 1.5 K/UL (ref 0.3–1)
MONOCYTES # BLD AUTO: 1.5 K/UL (ref 0.3–1)
MONOCYTES NFR BLD: 10.3 % (ref 4–15)
MONOCYTES NFR BLD: 10.9 % (ref 4–15)
MONOCYTES NFR BLD: 9.9 % (ref 4–15)
NEUTROPHILS # BLD AUTO: 10.8 K/UL (ref 1.8–7.7)
NEUTROPHILS # BLD AUTO: 12.1 K/UL (ref 1.8–7.7)
NEUTROPHILS # BLD AUTO: 9.4 K/UL (ref 1.8–7.7)
NEUTROPHILS NFR BLD: 79.3 % (ref 38–73)
NEUTROPHILS NFR BLD: 79.9 % (ref 38–73)
NEUTROPHILS NFR BLD: 81.2 % (ref 38–73)
NRBC BLD-RTO: 0 /100 WBC
PHOSPHATE SERPL-MCNC: 2.7 MG/DL (ref 2.7–4.5)
PLATELET # BLD AUTO: 176 K/UL (ref 150–450)
PLATELET # BLD AUTO: 206 K/UL (ref 150–450)
PLATELET # BLD AUTO: 219 K/UL (ref 150–450)
PMV BLD AUTO: 10 FL (ref 9.2–12.9)
PMV BLD AUTO: 10.4 FL (ref 9.2–12.9)
PMV BLD AUTO: 9.4 FL (ref 9.2–12.9)
POTASSIUM SERPL-SCNC: 4 MMOL/L (ref 3.5–5.1)
PROT SERPL-MCNC: 6 G/DL (ref 6–8.4)
PROTHROMBIN TIME: 11.4 SEC (ref 9–12.5)
RBC # BLD AUTO: 3.19 M/UL (ref 4.6–6.2)
RBC # BLD AUTO: 3.37 M/UL (ref 4.6–6.2)
RBC # BLD AUTO: 3.57 M/UL (ref 4.6–6.2)
SODIUM SERPL-SCNC: 131 MMOL/L (ref 136–145)
WBC # BLD AUTO: 11.87 K/UL (ref 3.9–12.7)
WBC # BLD AUTO: 13.52 K/UL (ref 3.9–12.7)
WBC # BLD AUTO: 14.92 K/UL (ref 3.9–12.7)

## 2022-09-29 PROCEDURE — 20600001 HC STEP DOWN PRIVATE ROOM

## 2022-09-29 PROCEDURE — 83735 ASSAY OF MAGNESIUM: CPT | Performed by: NURSE PRACTITIONER

## 2022-09-29 PROCEDURE — 93750 PR INTERROGATE VENT ASSIST DEV, IN PERSON, W PHYSICIAN ANALYSIS: ICD-10-PCS | Mod: ,,, | Performed by: INTERNAL MEDICINE

## 2022-09-29 PROCEDURE — 80076 HEPATIC FUNCTION PANEL: CPT | Performed by: NURSE PRACTITIONER

## 2022-09-29 PROCEDURE — 85730 THROMBOPLASTIN TIME PARTIAL: CPT | Performed by: PHYSICIAN ASSISTANT

## 2022-09-29 PROCEDURE — 63600175 PHARM REV CODE 636 W HCPCS: Performed by: STUDENT IN AN ORGANIZED HEALTH CARE EDUCATION/TRAINING PROGRAM

## 2022-09-29 PROCEDURE — 36415 COLL VENOUS BLD VENIPUNCTURE: CPT | Performed by: NURSE PRACTITIONER

## 2022-09-29 PROCEDURE — 85025 COMPLETE CBC W/AUTO DIFF WBC: CPT | Mod: 91 | Performed by: NURSE PRACTITIONER

## 2022-09-29 PROCEDURE — C1751 CATH, INF, PER/CENT/MIDLINE: HCPCS

## 2022-09-29 PROCEDURE — 99233 SBSQ HOSP IP/OBS HIGH 50: CPT | Mod: ,,, | Performed by: STUDENT IN AN ORGANIZED HEALTH CARE EDUCATION/TRAINING PROGRAM

## 2022-09-29 PROCEDURE — 93750 INTERROGATION VAD IN PERSON: CPT | Mod: ,,, | Performed by: INTERNAL MEDICINE

## 2022-09-29 PROCEDURE — 85730 THROMBOPLASTIN TIME PARTIAL: CPT | Mod: 91 | Performed by: INTERNAL MEDICINE

## 2022-09-29 PROCEDURE — 27000248 HC VAD-ADDITIONAL DAY

## 2022-09-29 PROCEDURE — 99233 PR SUBSEQUENT HOSPITAL CARE,LEVL III: ICD-10-PCS | Mod: ,,, | Performed by: PHYSICIAN ASSISTANT

## 2022-09-29 PROCEDURE — 25000003 PHARM REV CODE 250: Performed by: STUDENT IN AN ORGANIZED HEALTH CARE EDUCATION/TRAINING PROGRAM

## 2022-09-29 PROCEDURE — A4216 STERILE WATER/SALINE, 10 ML: HCPCS | Performed by: INTERNAL MEDICINE

## 2022-09-29 PROCEDURE — 76937 US GUIDE VASCULAR ACCESS: CPT

## 2022-09-29 PROCEDURE — 36415 COLL VENOUS BLD VENIPUNCTURE: CPT | Performed by: PHYSICIAN ASSISTANT

## 2022-09-29 PROCEDURE — 84100 ASSAY OF PHOSPHORUS: CPT | Performed by: PHYSICIAN ASSISTANT

## 2022-09-29 PROCEDURE — 25000003 PHARM REV CODE 250: Performed by: NURSE PRACTITIONER

## 2022-09-29 PROCEDURE — 25000003 PHARM REV CODE 250: Performed by: INTERNAL MEDICINE

## 2022-09-29 PROCEDURE — 80048 BASIC METABOLIC PNL TOTAL CA: CPT | Performed by: NURSE PRACTITIONER

## 2022-09-29 PROCEDURE — 99233 PR SUBSEQUENT HOSPITAL CARE,LEVL III: ICD-10-PCS | Mod: ,,, | Performed by: STUDENT IN AN ORGANIZED HEALTH CARE EDUCATION/TRAINING PROGRAM

## 2022-09-29 PROCEDURE — 83615 LACTATE (LD) (LDH) ENZYME: CPT | Performed by: NURSE PRACTITIONER

## 2022-09-29 PROCEDURE — 63600175 PHARM REV CODE 636 W HCPCS: Performed by: PHYSICIAN ASSISTANT

## 2022-09-29 PROCEDURE — 99233 SBSQ HOSP IP/OBS HIGH 50: CPT | Mod: ,,, | Performed by: PHYSICIAN ASSISTANT

## 2022-09-29 PROCEDURE — 94761 N-INVAS EAR/PLS OXIMETRY MLT: CPT

## 2022-09-29 PROCEDURE — 99900035 HC TECH TIME PER 15 MIN (STAT)

## 2022-09-29 PROCEDURE — 27000207 HC ISOLATION

## 2022-09-29 PROCEDURE — 36415 COLL VENOUS BLD VENIPUNCTURE: CPT | Performed by: STUDENT IN AN ORGANIZED HEALTH CARE EDUCATION/TRAINING PROGRAM

## 2022-09-29 PROCEDURE — 36573 INSJ PICC RS&I 5 YR+: CPT

## 2022-09-29 PROCEDURE — 99900026 HC AIRWAY MAINTENANCE (STAT)

## 2022-09-29 PROCEDURE — 85610 PROTHROMBIN TIME: CPT | Performed by: STUDENT IN AN ORGANIZED HEALTH CARE EDUCATION/TRAINING PROGRAM

## 2022-09-29 RX ORDER — OXYCODONE HYDROCHLORIDE 5 MG/1
5 TABLET ORAL EVERY 4 HOURS PRN
Status: DISCONTINUED | OUTPATIENT
Start: 2022-09-29 | End: 2022-10-05 | Stop reason: HOSPADM

## 2022-09-29 RX ORDER — SODIUM CHLORIDE 0.9 % (FLUSH) 0.9 %
10 SYRINGE (ML) INJECTION
Status: DISCONTINUED | OUTPATIENT
Start: 2022-09-29 | End: 2022-10-05 | Stop reason: HOSPADM

## 2022-09-29 RX ORDER — WARFARIN SODIUM 5 MG/1
5 TABLET ORAL DAILY
Status: DISCONTINUED | OUTPATIENT
Start: 2022-09-29 | End: 2022-09-30

## 2022-09-29 RX ORDER — OXYCODONE HYDROCHLORIDE 10 MG/1
10 TABLET ORAL EVERY 4 HOURS PRN
Status: DISCONTINUED | OUTPATIENT
Start: 2022-09-29 | End: 2022-10-05 | Stop reason: HOSPADM

## 2022-09-29 RX ORDER — HEPARIN SODIUM,PORCINE/D5W 25000/250
0-40 INTRAVENOUS SOLUTION INTRAVENOUS CONTINUOUS
Status: DISCONTINUED | OUTPATIENT
Start: 2022-09-29 | End: 2022-10-05

## 2022-09-29 RX ORDER — SODIUM CHLORIDE 0.9 % (FLUSH) 0.9 %
10 SYRINGE (ML) INJECTION EVERY 6 HOURS
Status: DISCONTINUED | OUTPATIENT
Start: 2022-09-29 | End: 2022-10-05 | Stop reason: HOSPADM

## 2022-09-29 RX ADMIN — HEPARIN SODIUM 12 UNITS/KG/HR: 10000 INJECTION, SOLUTION INTRAVENOUS at 08:09

## 2022-09-29 RX ADMIN — MEROPENEM 2 G: 1 INJECTION INTRAVENOUS at 05:09

## 2022-09-29 RX ADMIN — Medication 400 MG: at 12:09

## 2022-09-29 RX ADMIN — AMIODARONE HYDROCHLORIDE 400 MG: 200 TABLET ORAL at 08:09

## 2022-09-29 RX ADMIN — ACETAMINOPHEN 1000 MG: 500 TABLET ORAL at 02:09

## 2022-09-29 RX ADMIN — MIRTAZAPINE 30 MG: 30 TABLET, FILM COATED ORAL at 09:09

## 2022-09-29 RX ADMIN — ACETAMINOPHEN 1000 MG: 500 TABLET ORAL at 08:09

## 2022-09-29 RX ADMIN — LEVOTHYROXINE SODIUM 50 MCG: 50 TABLET ORAL at 05:09

## 2022-09-29 RX ADMIN — ALPRAZOLAM 1 MG: 0.5 TABLET ORAL at 09:09

## 2022-09-29 RX ADMIN — Medication 10 ML: at 12:09

## 2022-09-29 RX ADMIN — POLYETHYLENE GLYCOL 3350 17 G: 17 POWDER, FOR SOLUTION ORAL at 08:09

## 2022-09-29 RX ADMIN — MEXILETINE HYDROCHLORIDE 250 MG: 250 CAPSULE ORAL at 02:09

## 2022-09-29 RX ADMIN — Medication 10 ML: at 06:09

## 2022-09-29 RX ADMIN — MEXILETINE HYDROCHLORIDE 250 MG: 250 CAPSULE ORAL at 08:09

## 2022-09-29 RX ADMIN — PANTOPRAZOLE SODIUM 40 MG: 40 TABLET, DELAYED RELEASE ORAL at 12:09

## 2022-09-29 RX ADMIN — ACETAMINOPHEN 1000 MG: 500 TABLET ORAL at 09:09

## 2022-09-29 RX ADMIN — MEXILETINE HYDROCHLORIDE 250 MG: 250 CAPSULE ORAL at 09:09

## 2022-09-29 RX ADMIN — WARFARIN SODIUM 5 MG: 5 TABLET ORAL at 04:09

## 2022-09-29 RX ADMIN — MEROPENEM 2 G: 1 INJECTION INTRAVENOUS at 12:09

## 2022-09-29 RX ADMIN — Medication 10 ML: at 11:09

## 2022-09-29 RX ADMIN — DAPTOMYCIN 540 MG: 350 INJECTION, POWDER, LYOPHILIZED, FOR SOLUTION INTRAVENOUS at 04:09

## 2022-09-29 RX ADMIN — MEROPENEM 2 G: 1 INJECTION INTRAVENOUS at 09:09

## 2022-09-29 NOTE — PLAN OF CARE
Patient free from falls and injuries throughout shift.  AAO X 4 and VSS. Dopplers wnl. No alarms for shift. LVAD working WNL; dressing change due 09/29/2022. Continues on meropenem Q8. No acute events overnight. Patient resting well at this time.  Plan of care discussed with patient.  Patient verbalizes understanding.  Will continue to monitor.

## 2022-09-29 NOTE — PROGRESS NOTES
[facility] - Cardiology Stepdown  Heart Transplant  Progress Note    Patient Name: Tim Richards  MRN: [id number]  Admission Date: 9/24/2022  Hospital Length of Stay: 5 days  Attending Physician: Antonio Hadley Jr.,*  Primary Care Provider: Deyanira Booth MD  Principal Problem:Pseudoaneurysm of right femoral artery    Subjective:     **Interval History: Post op day 2 s/p ileofemoral bypass and muscle flap. Stepped down from ICU and remains hemodynamically stable. No more issues with wound vac since the dressing was changed. A/C being resumed today. Patient ok to resume physical rehab per plastic surgery team.    Continuous Infusions:   heparin (porcine) in D5W 12 Units/kg/hr (09/29/22 0858)     Scheduled Meds:   acetaminophen  1,000 mg Oral TID    amiodarone  400 mg Oral Daily    DAPTOmycin (CUBICIN) IV (PEDS and ADULTS)  6 mg/kg Intravenous Q24H    levothyroxine  50 mcg Oral Before breakfast    magnesium oxide  400 mg Oral Daily with lunch    meropenem (MERREM) IVPB  2 g Intravenous Q8H    mexiletine  250 mg Oral Q8H    mirtazapine  30 mg Oral QHS    pantoprazole  40 mg Oral Daily with lunch    polyethylene glycol  17 g Oral Daily    sodium chloride 0.9%  10 mL Intravenous Q6H    warfarin  5 mg Oral Daily     PRN Meds:sodium chloride, sodium chloride, sodium chloride 0.9%, sodium chloride 0.9%, ALPRAZolam, oxyCODONE, oxyCODONE, Flushing PICC Protocol **AND** sodium chloride 0.9% **AND** sodium chloride 0.9%, zolpidem    Review of patient's allergies indicates:   Allergen Reactions    Lisinopril Anaphylaxis    Hydralazine analogues      Chronic constipation, impotence, dizziness     Objective:     Vital Signs (Most Recent):  Temp: 97.8 °F (36.6 °C) (09/29/22 0753)  Pulse: 82 (09/29/22 0814)  Resp: 18 (09/29/22 0753)  BP: (!) 76/0 (09/29/22 0753)  SpO2: (!) 94 % (09/29/22 0814)   Vital Signs (24h Range):  Temp:  [97.1 °F (36.2 °C)-98.9 °F (37.2 °C)] 97.8 °F (36.6 °C)  Pulse:  [50-84] 82  Resp:  [17-27]  18  SpO2:  [93 %-99 %] 94 %  BP: ()/(0-58) 76/0  Arterial Line BP: (66-85)/(57-75) 71/61     Patient Vitals for the past 72 hrs (Last 3 readings):   Weight   09/29/22 0529 82.2 kg (181 lb 3.5 oz)   09/28/22 0301 90 kg (198 lb 6.6 oz)   09/27/22 0400 90.5 kg (199 lb 8.3 oz)       Body mass index is 23.91 kg/m².      Intake/Output Summary (Last 24 hours) at 9/29/2022 1145  Last data filed at 9/29/2022 0552  Gross per 24 hour   Intake 1002.15 ml   Output 675 ml   Net 327.15 ml         Hemodynamic Parameters:       Telemetry: SR    Physical Exam  Constitutional:       Appearance: Normal appearance.   HENT:      Head: Normocephalic and atraumatic.   Eyes:      Extraocular Movements: Extraocular movements intact.      Pupils: Pupils are equal, round, and reactive to light.   Neck:      Comments: Neck veins are not elevated  Cardiovascular:      Rate and Rhythm: Normal rate and regular rhythm.      Comments: Smooth VAD hum  Pulmonary:      Effort: Pulmonary effort is normal.      Breath sounds: Normal breath sounds.   Abdominal:      General: Bowel sounds are normal.      Palpations: Abdomen is soft.   Musculoskeletal:         General: No swelling. Normal range of motion.      Cervical back: Normal range of motion and neck supple.      Right lower leg: No edema.      Left lower leg: No edema.      Comments: R groin with wound vac and CLEO drain. No drainage or bleeding.    Skin:     General: Skin is warm and dry.      Capillary Refill: Capillary refill takes 2 to 3 seconds.   Neurological:      General: No focal deficit present.      Mental Status: He is alert and oriented to person, place, and time.   Psychiatric:         Mood and Affect: Mood normal.         Behavior: Behavior normal.         Thought Content: Thought content normal.         Judgment: Judgment normal.       Significant Labs:  CBC:  Recent Labs   Lab 09/28/22  1530 09/29/22  0041 09/29/22  0744   WBC 16.20* 14.92* 13.52*   RBC 3.18* 3.37* 3.57*   HGB  8.9* 9.1* 9.8*   HCT 28.6* 30.9* 33.4*    206 176   MCV 90 92 94   MCH 28.0 27.0 27.5   MCHC 31.1* 29.4* 29.3*       BNP:  Recent Labs   Lab 09/24/22  1803 09/26/22  0353 09/28/22  0332   * 97 787*       CMP:  Recent Labs   Lab 09/27/22  0300 09/28/22  0332 09/29/22  0451   GLU 69* 80 62*   CALCIUM 8.6* 8.6* 9.0   ALBUMIN 2.4* 2.2* 2.2*   PROT 6.1 5.5* 6.0   * 134* 131*   K 4.0 4.9 4.0   CO2 23 22* 24    105 102   BUN 10 14 14   CREATININE 1.4 1.1 1.2   ALKPHOS 84 75 87   ALT 19 15 17   AST 36 31 27   BILITOT 0.6 0.5 0.6        Coagulation:   Recent Labs   Lab 09/27/22  0300 09/28/22  0332 09/29/22  0451 09/29/22  0740   INR 1.3* 1.2 1.1  --    APTT  --   --  32.4* 26.6       LDH:  Recent Labs   Lab 09/27/22  0300 09/28/22  0332 09/29/22  0451   * 303* 308*       Microbiology:  Microbiology Results (last 7 days)       Procedure Component Value Units Date/Time    Culture, Anaerobe [[id number]] Collected: 09/27/22 0944    Order Status: Completed Specimen: Wound from Groin Updated: 09/29/22 1059     Anaerobic Culture Culture in progress    Narrative:      4. FEMORAL TISSUE RIGHT GROIN    Culture, Anaerobe [[id number]] Collected: 09/27/22 0944    Order Status: Completed Specimen: Wound from Groin Updated: 09/29/22 1058     Anaerobic Culture Culture in progress    Narrative:      3. RIGHT FEMORAL GRAFT    Culture, Anaerobe [[id number]] Collected: 09/27/22 0944    Order Status: Completed Specimen: Wound from Groin Updated: 09/29/22 1057     Anaerobic Culture Culture in progress    Narrative:      2. RIGHT FEMORAL ARTERY    Culture, Anaerobe [[id number]] Collected: 09/27/22 0944    Order Status: Completed Specimen: Wound from Groin Updated: 09/29/22 1056     Anaerobic Culture Culture in progress    Narrative:      1. RIGHT GROIN WOUND    Culture, Anaerobe [[id number]] Collected: 09/25/22 0815    Order Status: Completed Specimen: Wound from Groin Updated: 09/29/22 0932     Anaerobic Culture No  anaerobes isolated    AFB Culture & Smear [[id number]] Collected: 09/27/22 0944    Order Status: Completed Specimen: Wound from Groin Updated: 09/29/22 0927     AFB Culture & Smear Culture in progress     AFB CULTURE STAIN No acid fast bacilli seen.    Narrative:      1. RIGHT GROIN WOUND    Blood culture [[id number]] Collected: 09/25/22 0138    Order Status: Completed Specimen: Blood from Peripheral, Hand, Right Updated: 09/29/22 0612     Blood Culture, Routine No Growth to date      No Growth to date      No Growth to date      No Growth to date      No Growth to date    Narrative:      Collection has been rescheduled by TR3 at 09/25/2022 00:31 Reason: Pt   dont wanna get stuck rn rachille  Collection has been rescheduled by TR3 at 09/25/2022 00:31 Reason: Pt   dont wanna get stuck rn rachille    Blood culture [[id number]] Collected: 09/25/22 0133    Order Status: Completed Specimen: Blood from Peripheral, Lower Arm, Left Updated: 09/29/22 0612     Blood Culture, Routine No Growth to date      No Growth to date      No Growth to date      No Growth to date      No Growth to date    Narrative:      Collection has been rescheduled by TR3 at 09/25/2022 00:31 Reason: Pt   dont wanna get stuck rn rachille  Collection has been rescheduled by TR3 at 09/25/2022 00:31 Reason: Pt   dont wanna get stuck rn rachille    AFB Culture & Smear [[id number]] Collected: 09/27/22 0944    Order Status: Completed Specimen: Wound from Groin Updated: 09/28/22 2127     AFB Culture & Smear Culture in progress     AFB CULTURE STAIN No acid fast bacilli seen.    Narrative:      2. RIGHT FEMORAL ARTERY    AFB Culture & Smear [[id number]] Collected: 09/27/22 0944    Order Status: Completed Specimen: Wound from Groin Updated: 09/28/22 2127     AFB Culture & Smear Culture in progress     AFB CULTURE STAIN No acid fast bacilli seen.    Narrative:      3. RIGHT FEMORAL GRAFT    AFB Culture & Smear [[id number]] Collected: 09/27/22 0944    Order Status:  Completed Specimen: Wound from Groin Updated: 09/28/22 2127     AFB Culture & Smear Culture in progress     AFB CULTURE STAIN No acid fast bacilli seen.    Narrative:      4. FEMORAL TISSUE RIGHT GROIN    Aerobic culture [[id number]]  (Abnormal) Collected: 09/27/22 0944    Order Status: Completed Specimen: Wound from Groin Updated: 09/28/22 1025     Aerobic Bacterial Culture GRAM NEGATIVE LAURA  Rare  Identification and susceptibility pending      Narrative:      4. FEMORAL TISSUE RIGHT GROIN    Aerobic culture [[id number]]  (Abnormal) Collected: 09/27/22 0944    Order Status: Completed Specimen: Wound from Groin Updated: 09/28/22 1024     Aerobic Bacterial Culture GRAM NEGATIVE LAURA  Rare  Identification and susceptibility pending      Narrative:      3. RIGHT FEMORAL GRAFT    Aerobic culture [[id number]]  (Abnormal) Collected: 09/27/22 0944    Order Status: Completed Specimen: Wound from Groin Updated: 09/28/22 1009     Aerobic Bacterial Culture GRAM NEGATIVE LAURA  Moderate  Identification and susceptibility pending      Narrative:      1. RIGHT GROIN WOUND    Aerobic culture [[id number]]  (Abnormal) Collected: 09/27/22 0944    Order Status: Completed Specimen: Wound from Groin Updated: 09/28/22 0926     Aerobic Bacterial Culture GRAM NEGATIVE LAURA  Rare  Identification and susceptibility pending      Narrative:      2. RIGHT FEMORAL ARTERY    Gram stain [[id number]] Collected: 09/27/22 0944    Order Status: Completed Specimen: Wound from Groin Updated: 09/27/22 1312     Gram Stain Result Few WBC's      No organisms seen    Narrative:      1. RIGHT GROIN WOUND    Gram stain [[id number]] Collected: 09/27/22 0944    Order Status: Completed Specimen: Wound from Groin Updated: 09/27/22 1204     Gram Stain Result No WBC's      No organisms seen    Narrative:      2. RIGHT FEMORAL ARTERY    Gram stain [[id number]] Collected: 09/27/22 0944    Order Status: Completed Specimen: Wound from Groin Updated: 09/27/22 1203     Gram Stain  Result No WBC's      No organisms seen    Narrative:      3. RIGHT FEMORAL GRAFT    Gram stain [[id number]] Collected: 09/27/22 0944    Order Status: Completed Specimen: Wound from Groin Updated: 09/27/22 1202     Gram Stain Result No WBC's      No organisms seen    Narrative:      4. FEMORAL TISSUE RIGHT GROIN    Fungus culture [[id number]] Collected: 09/27/22 0944    Order Status: Sent Specimen: Wound from Groin Updated: 09/27/22 1036    Fungus culture [[id number]] Collected: 09/27/22 0944    Order Status: Sent Specimen: Wound from Groin Updated: 09/27/22 1034    Fungus culture [[id number]] Collected: 09/27/22 0944    Order Status: Sent Specimen: Wound from Groin Updated: 09/27/22 1032    Fungus culture [[id number]] Collected: 09/27/22 0944    Order Status: Sent Specimen: Wound from Groin Updated: 09/27/22 1029    Aerobic culture [[id number]]  (Abnormal)  (Susceptibility) Collected: 09/25/22 0815    Order Status: Completed Specimen: Wound from Groin Updated: 09/27/22 0945     Aerobic Bacterial Culture ESCHERICHIA COLI ESBL  Few      Fungus culture [[id number]] Collected: 09/25/22 0815    Order Status: Completed Specimen: Wound from Groin Updated: 09/26/22 1150     Fungus (Mycology) Culture Culture in progress    Blood culture [[id number]]     Order Status: Canceled Specimen: Blood     Blood culture [[id number]]     Order Status: Canceled Specimen: Blood             I have reviewed all pertinent labs within the past 24 hours.    Estimated Creatinine Clearance: 78.6 mL/min (based on SCr of 1.2 mg/dL).    Diagnostic Results:  I have reviewed and interpreted all pertinent imaging results/findings within the past 24 hours.    Assessment and Plan:     Tim Richards is a 56 y.o.  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  His post operative course was complicated by cardiogenic shock/RV failure requiring VA-ECMO.  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  He has a 8 Fr cuffed Shiley trach.  He had a prolonged and complicated hospital course from 6/24/22 - 9/4/22, was discharged to rehab (see clinic visit from 9/22/22 for additional details).  Of note he was last seen in clinic by Dr. Ragsdale on 9/22/22 after being discharged from rehab and at the time was noted to have purulence from the groin site and was placed on doxycycline PO.  He has had complaints of fatigue since his last hospitalization, per his wife.  This morning he was sitting and was crossing his legs for a prolonged period of time, and when he uncrossed them he noticed a pool of blood under him and active bleeding from his R groin where his previous ECMO cannula was.  He began to feel lightheaded when he tried walking.  Per wife he had 2 low flow alarms at home.      In the ED, patient stated he felt at his baseline and had no complaints.  No active bleeding at the time of my evaluation from R groin site.  Doppler BP was 80/0.  Hgb dropped from 9.4 -> 8.9 over the past 2 days.  INR was 3.0.  CT Angio performed per CV surgery recommendations.  HTS was consulted for admission, CT surgery was consulted in the ED for evaluation of groin hematoma.       Cardiovascular Studies  TTE 8/30/22  There is an LVAD present. Base speed is 6300 RPMs. The pump type is a Heartmate III. The interventricular septum appears midline. The aortic valve does not open.  The left ventricle is severely enlarged with eccentric hypertrophy and severely decreased systolic function.  The estimated ejection fraction is 10%.  There is left ventricular global hypokinesis.  Left ventricular diastolic dysfunction.  There is trivial continuous aortic regurgitation.  There is abnormal septal wall motion.  The right ventricle is poorly seen, but appears enlarged with reduced systolic  function.  Mild tricuspid regurgitation.      * Pseudoaneurysm of right femoral artery  Vascular surgery consulted, appreciate recommendations  CTA shows 3 new pseudoaneurysms of R femoral artery  S/p  Right Ileofemoral bypass (end to end). Distal External Iliac to distal femoral artery    Groin hematoma  -Was on VA-ECMO at last hospitalization with successful decannulation  -CTA revealed 3 possible new pseudoaneurysms  -INR 3 on admission, since reversed with PO vit K and FFP.  Holding A/C until further instruction from surgical team regarding resumption of A/C.   -Hgb was trending down since admit. Transfusing to keep Hgb > 10  -Cultures from right groin wound with ESBL E Coli, Continue Daptomycin and meropenem.     VT (ventricular tachycardia)  -Patient has [employer] SICD  -Currently on amiodarone 400mg qd and mexiletine 250mg TID, will continue     LVAD (left ventricular assist device) present  -S/P S/P DT HM2 3/8/2018 then S/P DT HM3 exchange 7/13/2022 2/2 to thrombosis of HM2 thought caused by COVID PNA  -Current speed 6300  -Last TTE done 8/30/2022 at 6300: LVEDD 7.44, LVEF 10%, RV appears enlarged and decreased, trace AI, trace MR, mild TR, PAS > 28, IVC 3, AV does not open, septum midline   Resuming A/C today with coumadin and minimally intense heparin gtt.   -LDH is stable    Procedure: Device Interrogation Including analysis of device parameters  Current Settings: Ventricular Assist Device  Review of device function is stable    TXP LVAD INTERROGATIONS 9/29/2022 9/29/2022 9/28/2022 9/28/2022 9/28/2022 9/28/2022 9/28/2022   Type HeartMate3 HeartMate3 HeartMate3 HeartMate3 HeartMate3 HeartMate3 HeartMate3   Flow 5.3 5.5 5.7 5.6 5.6 5.5 5.5   Speed 6400 6400 6400 6400 6400 6400 6400   PI 2.8 1.7 1.6 1.9 2.0 2.5 2.4   Power ([place]) 5.5 5.4 5.5 5.6 5.5 5.5 5.5   LSL 6000 6000 6000 6000 6000 - -   Low Flow Alarm - - - - - - -   High Power Alarm - - - - - - -   Pulsatility - Intermittent pulse Intermittent pulse -  - - -         Chronic combined systolic and diastolic congestive heart failure  Currently appears clinically euvolemic  Maintain K>4.0, Mg>2.0        Yadiel Coley PA-C  Heart Transplant  [facility] - Cardiology Stepdown

## 2022-09-29 NOTE — SUBJECTIVE & OBJECTIVE
**Interval History: Post op day 2 s/p ileofemoral bypass and muscle flap. Stepped down from ICU and remains hemodynamically stable. No more issues with wound vac since the dressing was changed. A/C being resumed today.     Continuous Infusions:   heparin (porcine) in D5W 12 Units/kg/hr (09/29/22 0858)     Scheduled Meds:   acetaminophen  1,000 mg Oral TID    amiodarone  400 mg Oral Daily    DAPTOmycin (CUBICIN) IV (PEDS and ADULTS)  6 mg/kg Intravenous Q24H    levothyroxine  50 mcg Oral Before breakfast    magnesium oxide  400 mg Oral Daily with lunch    meropenem (MERREM) IVPB  2 g Intravenous Q8H    mexiletine  250 mg Oral Q8H    mirtazapine  30 mg Oral QHS    pantoprazole  40 mg Oral Daily with lunch    polyethylene glycol  17 g Oral Daily    sodium chloride 0.9%  10 mL Intravenous Q6H    warfarin  5 mg Oral Daily     PRN Meds:sodium chloride, sodium chloride, sodium chloride 0.9%, sodium chloride 0.9%, ALPRAZolam, oxyCODONE, oxyCODONE, Flushing PICC Protocol **AND** sodium chloride 0.9% **AND** sodium chloride 0.9%, zolpidem    Review of patient's allergies indicates:   Allergen Reactions    Lisinopril Anaphylaxis    Hydralazine analogues      Chronic constipation, impotence, dizziness     Objective:     Vital Signs (Most Recent):  Temp: 97.8 °F (36.6 °C) (09/29/22 0753)  Pulse: 82 (09/29/22 0814)  Resp: 18 (09/29/22 0753)  BP: (!) 76/0 (09/29/22 0753)  SpO2: (!) 94 % (09/29/22 0814)   Vital Signs (24h Range):  Temp:  [97.1 °F (36.2 °C)-98.9 °F (37.2 °C)] 97.8 °F (36.6 °C)  Pulse:  [50-84] 82  Resp:  [17-27] 18  SpO2:  [93 %-99 %] 94 %  BP: ()/(0-58) 76/0  Arterial Line BP: (66-85)/(57-75) 71/61     Patient Vitals for the past 72 hrs (Last 3 readings):   Weight   09/29/22 0529 82.2 kg (181 lb 3.5 oz)   09/28/22 0301 90 kg (198 lb 6.6 oz)   09/27/22 0400 90.5 kg (199 lb 8.3 oz)       Body mass index is 23.91 kg/m².      Intake/Output Summary (Last 24 hours) at 9/29/2022 1145  Last data filed at 9/29/2022  0552  Gross per 24 hour   Intake 1002.15 ml   Output 675 ml   Net 327.15 ml         Hemodynamic Parameters:       Telemetry: SR    Physical Exam  Constitutional:       Appearance: Normal appearance.   HENT:      Head: Normocephalic and atraumatic.   Eyes:      Extraocular Movements: Extraocular movements intact.      Pupils: Pupils are equal, round, and reactive to light.   Neck:      Comments: Neck veins are not elevated  Cardiovascular:      Rate and Rhythm: Normal rate and regular rhythm.      Comments: Smooth VAD hum  Pulmonary:      Effort: Pulmonary effort is normal.      Breath sounds: Normal breath sounds.   Abdominal:      General: Bowel sounds are normal.      Palpations: Abdomen is soft.   Musculoskeletal:         General: No swelling. Normal range of motion.      Cervical back: Normal range of motion and neck supple.      Right lower leg: No edema.      Left lower leg: No edema.      Comments: R groin with wound vac and CLEO drain. No drainage or bleeding.    Skin:     General: Skin is warm and dry.      Capillary Refill: Capillary refill takes 2 to 3 seconds.   Neurological:      General: No focal deficit present.      Mental Status: He is alert and oriented to person, place, and time.   Psychiatric:         Mood and Affect: Mood normal.         Behavior: Behavior normal.         Thought Content: Thought content normal.         Judgment: Judgment normal.       Significant Labs:  CBC:  Recent Labs   Lab 09/28/22  1530 09/29/22  0041 09/29/22  0744   WBC 16.20* 14.92* 13.52*   RBC 3.18* 3.37* 3.57*   HGB 8.9* 9.1* 9.8*   HCT 28.6* 30.9* 33.4*    206 176   MCV 90 92 94   MCH 28.0 27.0 27.5   MCHC 31.1* 29.4* 29.3*       BNP:  Recent Labs   Lab 09/24/22  1803 09/26/22  0353 09/28/22  0332   * 97 787*       CMP:  Recent Labs   Lab 09/27/22  0300 09/28/22  0332 09/29/22  0451   GLU 69* 80 62*   CALCIUM 8.6* 8.6* 9.0   ALBUMIN 2.4* 2.2* 2.2*   PROT 6.1 5.5* 6.0   * 134* 131*   K 4.0 4.9 4.0    CO2 23 22* 24    105 102   BUN 10 14 14   CREATININE 1.4 1.1 1.2   ALKPHOS 84 75 87   ALT 19 15 17   AST 36 31 27   BILITOT 0.6 0.5 0.6        Coagulation:   Recent Labs   Lab 09/27/22  0300 09/28/22  0332 09/29/22  0451 09/29/22  0740   INR 1.3* 1.2 1.1  --    APTT  --   --  32.4* 26.6       LDH:  Recent Labs   Lab 09/27/22  0300 09/28/22  0332 09/29/22  0451   * 303* 308*       Microbiology:  Microbiology Results (last 7 days)       Procedure Component Value Units Date/Time    Culture, Anaerobe [[id number]] Collected: 09/27/22 0944    Order Status: Completed Specimen: Wound from Groin Updated: 09/29/22 1059     Anaerobic Culture Culture in progress    Narrative:      4. FEMORAL TISSUE RIGHT GROIN    Culture, Anaerobe [[id number]] Collected: 09/27/22 0944    Order Status: Completed Specimen: Wound from Groin Updated: 09/29/22 1058     Anaerobic Culture Culture in progress    Narrative:      3. RIGHT FEMORAL GRAFT    Culture, Anaerobe [[id number]] Collected: 09/27/22 0944    Order Status: Completed Specimen: Wound from Groin Updated: 09/29/22 1057     Anaerobic Culture Culture in progress    Narrative:      2. RIGHT FEMORAL ARTERY    Culture, Anaerobe [[id number]] Collected: 09/27/22 0944    Order Status: Completed Specimen: Wound from Groin Updated: 09/29/22 1056     Anaerobic Culture Culture in progress    Narrative:      1. RIGHT GROIN WOUND    Culture, Anaerobe [[id number]] Collected: 09/25/22 0815    Order Status: Completed Specimen: Wound from Groin Updated: 09/29/22 0932     Anaerobic Culture No anaerobes isolated    AFB Culture & Smear [[id number]] Collected: 09/27/22 0944    Order Status: Completed Specimen: Wound from Groin Updated: 09/29/22 0927     AFB Culture & Smear Culture in progress     AFB CULTURE STAIN No acid fast bacilli seen.    Narrative:      1. RIGHT GROIN WOUND    Blood culture [[id number]] Collected: 09/25/22 0138    Order Status: Completed Specimen: Blood from Peripheral, Hand,  Right Updated: 09/29/22 0612     Blood Culture, Routine No Growth to date      No Growth to date      No Growth to date      No Growth to date      No Growth to date    Narrative:      Collection has been rescheduled by TR3 at 09/25/2022 00:31 Reason: Pt   dont wanna get stuck rn rachille  Collection has been rescheduled by TR3 at 09/25/2022 00:31 Reason: Pt   dont wanna get stuck rn rachille    Blood culture [[id number]] Collected: 09/25/22 0133    Order Status: Completed Specimen: Blood from Peripheral, Lower Arm, Left Updated: 09/29/22 0612     Blood Culture, Routine No Growth to date      No Growth to date      No Growth to date      No Growth to date      No Growth to date    Narrative:      Collection has been rescheduled by TR3 at 09/25/2022 00:31 Reason: Pt   dont wanna get stuck rn rachille  Collection has been rescheduled by TR3 at 09/25/2022 00:31 Reason: Pt   dont wanna get stuck rn rachille    AFB Culture & Smear [[id number]] Collected: 09/27/22 0944    Order Status: Completed Specimen: Wound from Groin Updated: 09/28/22 2127     AFB Culture & Smear Culture in progress     AFB CULTURE STAIN No acid fast bacilli seen.    Narrative:      2. RIGHT FEMORAL ARTERY    AFB Culture & Smear [[id number]] Collected: 09/27/22 0944    Order Status: Completed Specimen: Wound from Groin Updated: 09/28/22 2127     AFB Culture & Smear Culture in progress     AFB CULTURE STAIN No acid fast bacilli seen.    Narrative:      3. RIGHT FEMORAL GRAFT    AFB Culture & Smear [[id number]] Collected: 09/27/22 0944    Order Status: Completed Specimen: Wound from Groin Updated: 09/28/22 2127     AFB Culture & Smear Culture in progress     AFB CULTURE STAIN No acid fast bacilli seen.    Narrative:      4. FEMORAL TISSUE RIGHT GROIN    Aerobic culture [[id number]]  (Abnormal) Collected: 09/27/22 0944    Order Status: Completed Specimen: Wound from Groin Updated: 09/28/22 1025     Aerobic Bacterial Culture GRAM NEGATIVE  LAURA  Rare  Identification and susceptibility pending      Narrative:      4. FEMORAL TISSUE RIGHT GROIN    Aerobic culture [[id number]]  (Abnormal) Collected: 09/27/22 0944    Order Status: Completed Specimen: Wound from Groin Updated: 09/28/22 1024     Aerobic Bacterial Culture GRAM NEGATIVE LAURA  Rare  Identification and susceptibility pending      Narrative:      3. RIGHT FEMORAL GRAFT    Aerobic culture [[id number]]  (Abnormal) Collected: 09/27/22 0944    Order Status: Completed Specimen: Wound from Groin Updated: 09/28/22 1009     Aerobic Bacterial Culture GRAM NEGATIVE LAURA  Moderate  Identification and susceptibility pending      Narrative:      1. RIGHT GROIN WOUND    Aerobic culture [[id number]]  (Abnormal) Collected: 09/27/22 0944    Order Status: Completed Specimen: Wound from Groin Updated: 09/28/22 0926     Aerobic Bacterial Culture GRAM NEGATIVE LAURA  Rare  Identification and susceptibility pending      Narrative:      2. RIGHT FEMORAL ARTERY    Gram stain [[id number]] Collected: 09/27/22 0944    Order Status: Completed Specimen: Wound from Groin Updated: 09/27/22 1312     Gram Stain Result Few WBC's      No organisms seen    Narrative:      1. RIGHT GROIN WOUND    Gram stain [[id number]] Collected: 09/27/22 0944    Order Status: Completed Specimen: Wound from Groin Updated: 09/27/22 1204     Gram Stain Result No WBC's      No organisms seen    Narrative:      2. RIGHT FEMORAL ARTERY    Gram stain [[id number]] Collected: 09/27/22 0944    Order Status: Completed Specimen: Wound from Groin Updated: 09/27/22 1203     Gram Stain Result No WBC's      No organisms seen    Narrative:      3. RIGHT FEMORAL GRAFT    Gram stain [[id number]] Collected: 09/27/22 0944    Order Status: Completed Specimen: Wound from Groin Updated: 09/27/22 1202     Gram Stain Result No WBC's      No organisms seen    Narrative:      4. FEMORAL TISSUE RIGHT GROIN    Fungus culture [[id number]] Collected: 09/27/22 0944    Order Status: Sent  Specimen: Wound from Groin Updated: 09/27/22 1036    Fungus culture [[id number]] Collected: 09/27/22 0944    Order Status: Sent Specimen: Wound from Groin Updated: 09/27/22 1034    Fungus culture [[id number]] Collected: 09/27/22 0944    Order Status: Sent Specimen: Wound from Groin Updated: 09/27/22 1032    Fungus culture [[id number]] Collected: 09/27/22 0944    Order Status: Sent Specimen: Wound from Groin Updated: 09/27/22 1029    Aerobic culture [[id number]]  (Abnormal)  (Susceptibility) Collected: 09/25/22 0815    Order Status: Completed Specimen: Wound from Groin Updated: 09/27/22 0945     Aerobic Bacterial Culture ESCHERICHIA COLI ESBL  Few      Fungus culture [[id number]] Collected: 09/25/22 0815    Order Status: Completed Specimen: Wound from Groin Updated: 09/26/22 1150     Fungus (Mycology) Culture Culture in progress    Blood culture [[id number]]     Order Status: Canceled Specimen: Blood     Blood culture [[id number]]     Order Status: Canceled Specimen: Blood             I have reviewed all pertinent labs within the past 24 hours.    Estimated Creatinine Clearance: 78.6 mL/min (based on SCr of 1.2 mg/dL).    Diagnostic Results:  I have reviewed and interpreted all pertinent imaging results/findings within the past 24 hours.

## 2022-09-29 NOTE — PT/OT/SLP PROGRESS
"Physical Therapy      Patient Name:  Tim Richards   MRN:  [id number]    Patient not seen today secondary to Patient unwilling to participate. Pt visited by OT as well as PT, unwilling to participate at this time despite education for the need for out of bed mobility.  Pt states "I just had surgery" as well as "i'll tell you when I'm ready for therapy, I don't care what the doctor says".  Will follow-up when appropriate.    "

## 2022-09-29 NOTE — PROGRESS NOTES
09/29/22 0530        VAD 07/13/22 1300 Left ventricular assist device HeartMate 3   Placement Date/Time: 07/13/22 1300   Present Prior to Hospital Arrival?: No  Inserted by: MD  VAD Type: Left ventricular assist device  VAD Brand: HeartMate 3   Site Location Abdomen left   Site Assessment Intact;Dry   Driveline Exit Site 2   Driveline Assessment Free of Kinks;Intact   Dressing Status Old drainage   Dressing Intervention Sterile dressing change   Performed By RN   Dressing Change Schedule Every other day   Dressing Change Due 10/01/22   Driveline Rumely in use [place] deng   Condition CDI   Date changed 09/29/22     Small sanguinous drainage at site.

## 2022-09-29 NOTE — PROGRESS NOTES
[facility] - [facility] Stepdown  Vascular Surgery  Progress Note    Patient Name: Tim Richards  MRN: [id number]  Admission Date: 9/24/2022  Primary Care Provider: Deyanira Booth MD    Subjective:     Interval History: Patient was stepped down. No acute overnight events, AF, VSS. Pain is well controlled. Wound vac with good seal. Some blood in CLEO drain. Patient requires to be started on anticoagulation and a PICC line    Post-Op Info:  Procedure(s) (LRB):  CREATION, BYPASS, ARTERIAL, ILIAC TO FEMORAL WITH GRAFT (Right)  CREATION, FLAP, MUSCLE ROTATION, SARTORIUS AND RECTUS FEMORIS (Right)  APPLICATION, WOUND VAC (Right)   2 Days Post-Op       Medications:  Continuous Infusions:   heparin (porcine) in D5W       Scheduled Meds:   acetaminophen  1,000 mg Oral TID    amiodarone  400 mg Oral Daily    DAPTOmycin (CUBICIN) IV (PEDS and ADULTS)  6 mg/kg Intravenous Q24H    levothyroxine  50 mcg Oral Before breakfast    magnesium oxide  400 mg Oral Daily with lunch    meropenem (MERREM) IVPB  2 g Intravenous Q8H    mexiletine  250 mg Oral Q8H    mirtazapine  30 mg Oral QHS    pantoprazole  40 mg Oral Daily with lunch    polyethylene glycol  17 g Oral Daily     PRN Meds:sodium chloride, sodium chloride, sodium chloride 0.9%, sodium chloride 0.9%, ALPRAZolam, oxyCODONE, oxyCODONE, zolpidem     Objective:     Vital Signs (Most Recent):  Temp: 98 °F (36.7 °C) (09/29/22 0529)  Pulse: 77 (09/29/22 0529)  Resp: 18 (09/29/22 0529)  BP: (!) 70/0 (09/29/22 0529)  SpO2: 95 % (09/29/22 0529)   Vital Signs (24h Range):  Temp:  [97.1 °F (36.2 °C)-98.9 °F (37.2 °C)] 98 °F (36.7 °C)  Pulse:  [50-84] 77  Resp:  [17-27] 18  SpO2:  [93 %-99 %] 95 %  BP: ()/(0-58) 70/0  Arterial Line BP: (66-87)/(57-75) 71/61         Physical Exam  Constitutional:       Appearance: Normal appearance.   HENT:      Head: Normocephalic and atraumatic.      Mouth/Throat:      Mouth: Mucous membranes are moist.   Eyes:      Pupils: Pupils are  equal, round, and reactive to light.   Cardiovascular:      Rate and Rhythm: Regular rhythm.   Abdominal:      General: Abdomen is flat.      Palpations: Abdomen is soft.   Musculoskeletal:      Comments: 5/5 strength all 4 extremities, biphasic waveforms DP bilaterally  Right groin area with wound vac and CLEO drain   Neurological:      Mental Status: He is alert.       Significant Labs:  CBC:   Recent Labs   Lab 09/29/22  0041   WBC 14.92*   RBC 3.37*   HGB 9.1*   HCT 30.9*      MCV 92   MCH 27.0   MCHC 29.4*     CMP:   Recent Labs   Lab 09/29/22  0451   GLU 62*   CALCIUM 9.0   ALBUMIN 2.2*   PROT 6.0   *   K 4.0   CO2 24      BUN 14   CREATININE 1.2   ALKPHOS 87   ALT 17   AST 27   BILITOT 0.6     Coagulation:   Recent Labs   Lab 09/29/22  0451   LABPROT 11.4   INR 1.1   APTT 32.4*     Lactic Acid: No results for input(s): LACTATE in the last 48 hours.  All pertinent labs from the last 24 hours have been reviewed.    Significant Diagnostics:  I have reviewed all pertinent imaging results/findings within the past 24 hours.    Assessment/Plan:     * Pseudoaneurysm of right femoral artery  Tim Richards is a 56 y.o. 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 Vascular surgery has been consulted for evaluation of right groin site concerning for pseudoaneurym. Patient is now s/p Rt groin exploration, creation of Right Ileofemoral bypass, and creation of muscle flap 9/27/22    Recommend:  - Infectious disease on the case, will f/u recommendations  - Recommend starting picc line  - Wound culture sent for anaerobic, aerobic, and fungal   - preliminary growth of gram negative jodie  - ABx: daptomycin, meropenem  - Strict bedrest, head of bed less than 30°, keep right leg straight  - Blood cultures x2: NGTD  - Ok to restart anticoagulation from  vascular perspective  - Anticoagulation per primary team            Jc Sal MD  Vascular Surgery  [facility] - [facility] Step[facility]

## 2022-09-29 NOTE — PROGRESS NOTES
Plastic and Reconstructive Surgery   Progress Note    Subjective:    Tim Somers is a 57y/o male s/p right iliofemoral bypass and sartorius and rectus femoris graft on 22.     Overnight events: No acute events over night.     Vitals: VSS    Need to know: Wound care will be taking over wound vac dressing maintenance.         Objective:  Vital signs in last 24 hours:  Temp:  [97.1 °F (36.2 °C)-98.9 °F (37.2 °C)] 98 °F (36.7 °C)  Pulse:  [50-84] 77  Resp:  [17-27] 18  SpO2:  [93 %-99 %] 95 %  BP: ()/(0-58) 70/0  Arterial Line BP: (66-87)/(57-75) 71/61    Intake/Output last 3 shifts:  I/O last 3 completed shifts:  In: 1846.2 [P.O.:1320; I.V.:188.1; IV Piggyback:338.1]  Out: 1167.5 [Urine:1110; Drains:32.5; Other:25]    Intake/Output this shift:  No intake/output data recorded.        Physical Exam:  VITAL SIGNS:   Vitals:    22 0331 22 0415 22 0429 22 0529   BP:    (!) 70/0   BP Location:    Right arm   Patient Position:    Lying   Pulse: 78 60 81 77   Resp:  18  18   Temp:    98 °F (36.7 °C)   TempSrc:    Oral   SpO2:  95%  95%   Weight:    82.2 kg (181 lb 3.5 oz)   Height:         TMAX: Temp (24hrs), Av.2 °F (36.8 °C), Min:97.1 °F (36.2 °C), Max:98.9 °F (37.2 °C)    General: Alert; No acute distress  Cardiovascular: Regular rate   Respiratory: Normal respiratory effort. Chest rise symmetric.   Abdomen: Soft, nontender, nondistended  Extremity: Moves all extremities equally.  Neurologic: No focal deficit. Speech normal      Scheduled Medications acetaminophen, 1,000 mg, TID  amiodarone, 400 mg, Daily  DAPTOmycin (CUBICIN) IV (PEDS and ADULTS), 6 mg/kg, Q24H  levothyroxine, 50 mcg, Before breakfast  magnesium oxide, 400 mg, Daily with lunch  meropenem (MERREM) IVPB, 2 g, Q8H  mexiletine, 250 mg, Q8H  mirtazapine, 30 mg, QHS  pantoprazole, 40 mg, Daily with lunch  polyethylene glycol, 17 g, Daily      PRN Medications sodium chloride, sodium chloride, sodium chloride 0.9%, sodium  chloride 0.9%, ALPRAZolam, oxyCODONE, oxyCODONE, zolpidem    Recent Labs:   Lab Results   Component Value Date    WBC 14.92 (H) 09/29/2022    HGB 9.1 (L) 09/29/2022    HCT 30.9 (L) 09/29/2022    MCV 92 09/29/2022     09/29/2022     Lab Results   Component Value Date    GLU 62 (L) 09/29/2022     (L) 09/29/2022    K 4.0 09/29/2022     09/29/2022    BUN 14 09/29/2022         Assessment: 56 y.o. y/o male 2 Days Post-Op s/p Procedure(s):  CREATION, BYPASS, ARTERIAL, ILIAC TO FEMORAL WITH GRAFT  CREATION, FLAP, MUSCLE ROTATION, SARTORIUS AND RECTUS FEMORIS  APPLICATION, WOUND VAC Doing well postoperatively.    Plan    Patient is stable from a plastic surgery perspective. If any further evaluation is necessary please reach out.       Dandre Andrews MD  Department of Plastic and Reconstructive Surgery  [phone] (office)

## 2022-09-29 NOTE — PT/OT/SLP PROGRESS
Occupational Therapy      Patient Name:  Tim Richards   MRN:  [id number]    Patient not seen today secondary to pt refusal 2* pain. Pt requested that OT return tomorrow and not today . Will follow-up 9/30.    9/29/2022

## 2022-09-29 NOTE — OP NOTE
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 Distal External Iliac to distal femoral artery  Explant, infected dacron femoral conduit    Modifier:  A 22 modifier is appropriate in this case given >200% the preoperative planning and intraoperative mental and physical effort required. .      Anesthesia: General     Operative Findings: Purulence seen sent for culture gram stain and fungal. Right CFA completely blown out at the old dacron conduit. Insitu bypass created with rifampin soaked dacron graft. Good signals in CFA, SFA and profunda at the end of the case. Plastics for Muscle flap creation at end of case.           Estimated Blood Loss has not been documented. EBL = 50cc.         Specimens:   Specimen (24h ago, onward)        None      Indications:  Patient is a 56-year-old male with a right common femoral aneurysm. The patient had a prior surgery to that artery   Preoperative evaluation revealed a right CFA aneurysm with a patent Superficial femoral artery and Profunda femoris artery.  The risks and benefits of aneurysm bypass and non operative management were explained to the patient, who elected to undergo surgical intervention.     Description of procedure:  Procedure was performed under general anesthesia.  The patient was placed supine on the operating table. Time-outs were performed using both pre induction and pre incision safety checklist to verify correct patient, procedure, site, and additional critical information prior to beginning the procedure.  Anesthesia team placed  appropriate lines.  Lagunas catheter was placed under sterile technique.  Patient's left lower extremity was prepped and draped in the usual sterile fashion.  Preoperative antibiotics were administered prior to skin incision.  A longitudinal  incision was made over the right common femoral artery  The incision was deepened through the subcutaneous tissue, exposing the fascia. The fascia was incised in the femoral space was entered.  A self-retaining retractor was applied, retracting the subcutaneous tissue and fat. The inguinal ligament was identified and lateral attachements were released to facilitate retraction cephalad. The femoral vessels were identified.  Working along the aneurysmal sac, the proximal neck of the common femoral artery was sharply dissected.  The caudal aspect of the aneurysmal sac was carefully dissected until we came across the superficial femoral artery.  Overlying veins and small crossing veins were ligated in order to get better exposure to the aneurysmal sac.  Care was taken to identify the nerves and sweep them laterally to avoid traction injury or transection. We circumferentially dissected the proximal and distal portions of the common femoral and superficial femoral artery.  Next we dissected the Profunda at its origin and the first branch point of the Profunda femoris artery. Vessel loops was used to control the proximal distal portions of the femoral artery.  Patient was then administered 9000 units of IV heparin. We placed a clamp on the external iliac artery.  Next we used profunda clamp for the profunda branches for the SFA we used a straight angled DeBakey clamp.  The CFA was then entered with a Knife, heavy Mayos scissors were then used to cut the artery both proximally and distally. The CFA was then removed enblock along with the old dacron grafty.  Next performed a limited proximal endartectomy of the Common femoral artery and distal external iliac artery. This was done with  debakey forceps and a freer elevator as well as tonsil clamp.  Brisk inflow was established we brought our 8 mm Dacron graft onto the surgical field.  We then began with the proximal anastomosis.  Beginning at the distal external iliac artery above the origins of the circumflex iliac artery we began our end to end anastomosis using 5 0 Prolene suture.  This was performed in a running continuous fashion from posterior to anterior.  Completing our anastomosis we then placed the angled DeBakey clamp on to the Dacron graft and pressurized the Dacron graft. The proximal anastomosis had excellent hemostasis. Next we performed our distal anastomosis in the same technique using 5 0 Prolene suture in a running continuous fashion. This was sewn to the distal CFA. The graft lumen was flushed retrograde and antegrade in typical fashion. The arteries were forward bled and back bleed. The angled DeBakey clamp was then released from the Dacron graft and flow was restored to the left lower extremity.  Doppler signals were triphasic  in the deep femoral artery.  Next we worked on hemostasis of the wound using thrombin-soaked Gelfoam.  Good hemostasis was achieved after reversal of the heparin with 60 units of protamine.  The wound was irrigated with saline. The wound was then closed by plastic surgery where they performed a muscle flap.  Please see plastic surgery op note for further details.  A debriefing checklist was completed to share information critical to the postoperative care of the patient.  Patient tolerated the procedure well was transferred to the postanesthesia care unit in stable condition.  All instrument sponge and needle counts were correct.  There were no complications.     Complications: none     Kentrell Kim MD PGY7  Vascular Surgery Fellow  [phone]

## 2022-09-29 NOTE — ASSESSMENT & PLAN NOTE
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   -LDH is stable    Procedure: Device Interrogation Including analysis of device parameters  Current Settings: Ventricular Assist Device  Review of device function is stable    TXP LVAD INTERROGATIONS 9/29/2022 9/29/2022 9/28/2022 9/28/2022 9/28/2022 9/28/2022 9/28/2022   Type HeartMate3 HeartMate3 HeartMate3 HeartMate3 HeartMate3 HeartMate3 HeartMate3   Flow 5.3 5.5 5.7 5.6 5.6 5.5 5.5   Speed 6400 6400 6400 6400 6400 6400 6400   PI 2.8 1.7 1.6 1.9 2.0 2.5 2.4   Power (Jackson) 5.5 5.4 5.5 5.6 5.5 5.5 5.5   LSL 6000 6000 6000 6000 6000 - -   Low Flow Alarm - - - - - - -   High Power Alarm - - - - - - -   Pulsatility - Intermittent pulse Intermittent pulse - - - -

## 2022-09-29 NOTE — ASSESSMENT & PLAN NOTE
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 CTA with new dilatation of R fem artery. Superficial wound cx with ESBL Ecoli. Blcx unrevealing. S/p OR with vascular/plastics for R groin exploration, creation of R ileofem bypass (end-end) with rif soaked dacron graft and creation of muscle flap - OR cx with GNR thus far - full op note pending; brief op note with purulent seen. Stepped down from ICU overnight, pt reports tolerating abx without issues.     Recommendations:  -continue empiric daptomycin and meropenem pending OR cx   -monitor for toxicities CK weekly   -anticipate prolonged course of IV abx   -follow up cx

## 2022-09-29 NOTE — PROGRESS NOTES
[facility] - Cardiology Stepdown  Infectious Disease  Progress Note    Patient Name: Tim Richards  MRN: [id number]  Admission Date: 9/24/2022  Length of Stay: 5 days  Attending Physician: Antonio Hadley Jr.,*  Primary Care Provider: Deyanira Booth MD    Isolation Status: Contact  Assessment/Plan:      * Pseudoaneurysm of right femoral artery  Concern for infected pseudoaneurysm/mycotic aneurysm given purulent drainage noted over prior cannulation site. CTA with new dilatation of R fem artery. Superficial wound cx with ESBL Ecoli. Blcx unrevealing. S/p OR with vascular/plastics for R groin exploration, creation of R ileofem bypass (end-end) with rif soaked dacron graft and creation of muscle flap - OR cx with GNR thus far - full op note pending; brief op note with purulent seen. Stepped down from ICU overnight, pt reports tolerating abx without issues.     Recommendations:  -continue empiric daptomycin and meropenem pending OR cx   -monitor for toxicities CK weekly   -anticipate prolonged course of IV abx   -follow up cx        Mycotic aneurysm  See pseudoaneurysm    Groin hematoma  See pseudoaneurysm          Thank you for your consult. I will follow-up with patient. Please contact us if you have any additional questions. Above d/w primary team.     Time: 35 minutes   50% of time spent on face-to-face counseling and coordination of care. Counseling included review of test results, diagnosis, and treatment plan with patient and/or family.        Jessica Perez MD  Infectious Disease  [facility] - Cardiology Stepdown    Subjective:     Principal Problem:Pseudoaneurysm of right femoral artery    HPI: 55 yo male with complex medical history including HM3 2018 with recent prolonged hospital stay for COVID with prior hospital course notable for AV repair, redo sternotomy, explant of prior LVAD and Va-ECMO (decannulated 7/19) with takeback for mediastinal washout/hematoma, sternal closure, creation of  neopericardium, Kleb aerogenes VAP s/p treatment admitted for bleeding from prior ECMO cannula site. Pt reported that he was seen in HTS clinic on 9/22 and was noted to have purulent drainage. He was given a course of doxycycline. Admission notable for CTA with new dilatation of R fem artery in the region of prior soft tissue density in R groin from prior ECMO cannulation site. Surgical disposition pending. Blcx ngtd and wound cultures from R groin in process. Pt is currently on doxycycline.     Interval History: No fevers documented overnight. Stepped down from ICU. Pt reports tolerating abx therapy. OR cx with GNR thus far. Old and new DLES dressed this morning per patient - denies drainage.       Review of Systems   Constitutional:  Negative for chills and fever.   All other systems reviewed and are negative.  Objective:     Vital Signs (Most Recent):  Temp: 97.8 °F (36.6 °C) (09/29/22 0753)  Pulse: 82 (09/29/22 0814)  Resp: 18 (09/29/22 0753)  BP: (!) 76/0 (09/29/22 0753)  SpO2: (!) 94 % (09/29/22 0814)   Vital Signs (24h Range):  Temp:  [97.1 °F (36.2 °C)-98.9 °F (37.2 °C)] 97.8 °F (36.6 °C)  Pulse:  [50-84] 82  Resp:  [17-27] 18  SpO2:  [93 %-99 %] 94 %  BP: ()/(0-58) 76/0  Arterial Line BP: (66-87)/(57-75) 71/61     Weight: 82.2 kg (181 lb 3.5 oz)  Body mass index is 23.91 kg/m².    Estimated Creatinine Clearance: 78.6 mL/min (based on SCr of 1.2 mg/dL).    Physical Exam  Constitutional:       General: He is not in acute distress.     Appearance: He is not ill-appearing or toxic-appearing.   HENT:      Head: Normocephalic and atraumatic.      Right Ear: External ear normal.      Left Ear: External ear normal.      Mouth/Throat:      Mouth: Mucous membranes are moist.      Pharynx: No oropharyngeal exudate or posterior oropharyngeal erythema.   Eyes:      General: No scleral icterus.        Left eye: No discharge.   Cardiovascular:      Comments: vad  Pulmonary:      Effort: Pulmonary effort is normal.  No respiratory distress.      Breath sounds: No stridor. No wheezing or rhonchi.   Abdominal:      General: There is no distension.      Tenderness: There is no abdominal tenderness. There is no guarding.      Comments: LVAD - dressed  Prior LVAD site - dressed, c/d   Musculoskeletal:      Right lower leg: No edema.      Left lower leg: No edema.   Skin:     General: Skin is warm and dry.      Findings: No erythema.      Comments: R groin - wound vac  Rleg surgical site dressed  R drain present   Neurological:      Mental Status: He is alert and oriented to person, place, and time. Mental status is at baseline.      Motor: No weakness.   Psychiatric:         Mood and Affect: Mood normal.         Behavior: Behavior normal.       Significant Labs:   Microbiology Results (last 7 days)       Procedure Component Value Units Date/Time    Culture, Anaerobe [[id number]] Collected: 09/25/22 0815    Order Status: Completed Specimen: Wound from Groin Updated: 09/29/22 0932     Anaerobic Culture No anaerobes isolated    AFB Culture & Smear [[id number]] Collected: 09/27/22 0944    Order Status: Completed Specimen: Wound from Groin Updated: 09/29/22 0927     AFB Culture & Smear Culture in progress     AFB CULTURE STAIN No acid fast bacilli seen.    Narrative:      1. RIGHT GROIN WOUND    Blood culture [[id number]] Collected: 09/25/22 0138    Order Status: Completed Specimen: Blood from Peripheral, Hand, Right Updated: 09/29/22 0612     Blood Culture, Routine No Growth to date      No Growth to date      No Growth to date      No Growth to date      No Growth to date    Narrative:      Collection has been rescheduled by TR3 at 09/25/2022 00:31 Reason: Pt   dont wanna get stuck beni tabor  Collection has been rescheduled by TR3 at 09/25/2022 00:31 Reason: Pt   dont wanna get stuck beni tabor    Blood culture [[id number]] Collected: 09/25/22 0133    Order Status: Completed Specimen: Blood from Peripheral, Lower Arm, Left Updated:  09/29/22 0612     Blood Culture, Routine No Growth to date      No Growth to date      No Growth to date      No Growth to date      No Growth to date    Narrative:      Collection has been rescheduled by TR3 at 09/25/2022 00:31 Reason: Pt   dont wanna get stuck rn sharona  Collection has been rescheduled by TR3 at 09/25/2022 00:31 Reason: Pt   dont wanna get stuck rn sharona    AFB Culture & Smear [[id number]] Collected: 09/27/22 0944    Order Status: Completed Specimen: Wound from Groin Updated: 09/28/22 2127     AFB Culture & Smear Culture in progress     AFB CULTURE STAIN No acid fast bacilli seen.    Narrative:      2. RIGHT FEMORAL ARTERY    AFB Culture & Smear [[id number]] Collected: 09/27/22 0944    Order Status: Completed Specimen: Wound from Groin Updated: 09/28/22 2127     AFB Culture & Smear Culture in progress     AFB CULTURE STAIN No acid fast bacilli seen.    Narrative:      3. RIGHT FEMORAL GRAFT    AFB Culture & Smear [[id number]] Collected: 09/27/22 0944    Order Status: Completed Specimen: Wound from Groin Updated: 09/28/22 2127     AFB Culture & Smear Culture in progress     AFB CULTURE STAIN No acid fast bacilli seen.    Narrative:      4. FEMORAL TISSUE RIGHT GROIN    Aerobic culture [[id number]]  (Abnormal) Collected: 09/27/22 0944    Order Status: Completed Specimen: Wound from Groin Updated: 09/28/22 1025     Aerobic Bacterial Culture GRAM NEGATIVE LAURA  Rare  Identification and susceptibility pending      Narrative:      4. FEMORAL TISSUE RIGHT GROIN    Aerobic culture [[id number]]  (Abnormal) Collected: 09/27/22 0944    Order Status: Completed Specimen: Wound from Groin Updated: 09/28/22 1024     Aerobic Bacterial Culture GRAM NEGATIVE LAURA  Rare  Identification and susceptibility pending      Narrative:      3. RIGHT FEMORAL GRAFT    Aerobic culture [[id number]]  (Abnormal) Collected: 09/27/22 0944    Order Status: Completed Specimen: Wound from Groin Updated: 09/28/22 1009     Aerobic  Bacterial Culture GRAM NEGATIVE LAURA  Moderate  Identification and susceptibility pending      Narrative:      1. RIGHT GROIN WOUND    Aerobic culture [[id number]]  (Abnormal) Collected: 09/27/22 0944    Order Status: Completed Specimen: Wound from Groin Updated: 09/28/22 0926     Aerobic Bacterial Culture GRAM NEGATIVE LAURA  Rare  Identification and susceptibility pending      Narrative:      2. RIGHT FEMORAL ARTERY    Culture, Anaerobe [[id number]] Collected: 09/27/22 0944    Order Status: Completed Specimen: Wound from Groin Updated: 09/28/22 0738     Anaerobic Culture Culture in progress    Narrative:      3. RIGHT FEMORAL GRAFT    Culture, Anaerobe [[id number]] Collected: 09/27/22 0944    Order Status: Completed Specimen: Wound from Groin Updated: 09/28/22 0738     Anaerobic Culture Culture in progress    Narrative:      4. FEMORAL TISSUE RIGHT GROIN    Culture, Anaerobe [[id number]] Collected: 09/27/22 0944    Order Status: Completed Specimen: Wound from Groin Updated: 09/28/22 0738     Anaerobic Culture Culture in progress    Narrative:      2. RIGHT FEMORAL ARTERY    Culture, Anaerobe [[id number]] Collected: 09/27/22 0944    Order Status: Completed Specimen: Wound from Groin Updated: 09/28/22 0738     Anaerobic Culture Culture in progress    Narrative:      1. RIGHT GROIN WOUND    Gram stain [[id number]] Collected: 09/27/22 0944    Order Status: Completed Specimen: Wound from Groin Updated: 09/27/22 1312     Gram Stain Result Few WBC's      No organisms seen    Narrative:      1. RIGHT GROIN WOUND    Gram stain [[id number]] Collected: 09/27/22 0944    Order Status: Completed Specimen: Wound from Groin Updated: 09/27/22 1204     Gram Stain Result No WBC's      No organisms seen    Narrative:      2. RIGHT FEMORAL ARTERY    Gram stain [[id number]] Collected: 09/27/22 0944    Order Status: Completed Specimen: Wound from Groin Updated: 09/27/22 1203     Gram Stain Result No WBC's      No organisms seen    Narrative:       3. RIGHT FEMORAL GRAFT    Gram stain [[id number]] Collected: 09/27/22 0944    Order Status: Completed Specimen: Wound from Groin Updated: 09/27/22 1202     Gram Stain Result No WBC's      No organisms seen    Narrative:      4. FEMORAL TISSUE RIGHT GROIN    Fungus culture [[id number]] Collected: 09/27/22 0944    Order Status: Sent Specimen: Wound from Groin Updated: 09/27/22 1036    Fungus culture [[id number]] Collected: 09/27/22 0944    Order Status: Sent Specimen: Wound from Groin Updated: 09/27/22 1034    Fungus culture [[id number]] Collected: 09/27/22 0944    Order Status: Sent Specimen: Wound from Groin Updated: 09/27/22 1032    Fungus culture [[id number]] Collected: 09/27/22 0944    Order Status: Sent Specimen: Wound from Groin Updated: 09/27/22 1029    Aerobic culture [[id number]]  (Abnormal)  (Susceptibility) Collected: 09/25/22 0815    Order Status: Completed Specimen: Wound from Groin Updated: 09/27/22 0945     Aerobic Bacterial Culture ESCHERICHIA COLI ESBL  Few      Fungus culture [[id number]] Collected: 09/25/22 0815    Order Status: Completed Specimen: Wound from Groin Updated: 09/26/22 1150     Fungus (Mycology) Culture Culture in progress    Blood culture [[id number]]     Order Status: Canceled Specimen: Blood     Blood culture [[id number]]     Order Status: Canceled Specimen: Blood             Significant Imaging: I have reviewed all pertinent imaging results/findings within the past 24 hours.

## 2022-09-29 NOTE — CONSULTS
"  [facility] - Cardiology Stepdown  Adult Nutrition  Consult Note    SUMMARY     Recommendations    1. Continue Cardiac diet      2. Add Boost Plus TID (chocolate) to optimize nutrient intake     3. RD to monitor and follow    Goals: Meet % EEN, EPN by RD f/u  Nutrition Goal Status: new  Communication of RD Recs:  (POC)    Assessment and Plan    Nutrition Problem:  Severe Protein-Calorie Malnutrition  Malnutrition in the context of Chronic Illness/Injury    Related to (etiology):  Inadequate nutrient intake    Signs and Symptoms (as evidenced by):  Energy Intake: <75% of estimated energy requirement for >1 month  Weight Loss: 28% x 5 months     Interventions(treatment strategy):  Collaboration with other providers  ONS    Nutrition Diagnosis Status:  New     Reason for Assessment    Reason For Assessment: consult  Diagnosis:  (Pseudoaneurysm of right femoral artery)  Relevant Medical History: HF, LVAD, S/p ileofemoral bypass and muscle flap  Interdisciplinary Rounds: did not attend    General Information Comments:   S/p ileofemoral bypass and muscle flap (9/27). LVAD present. Pt reports decreased appetite since sx. Tolerating >50% of meals per pt. Agreeable to Boost Plus (chocolate). Denies N/V, chewing/swallowing difficulties. Stated  lb. Per wt hx, noted significant wt loss of 72 lb (28%) x 5 month. Pt refuse NFPE today. Pt meet the criteria for severe malnutrition in the context of chronic illness.     Nutrition Discharge Planning: Cardiac diet    Nutrition Risk Screen    Nutrition Risk Screen: no indicators present    Anthropometrics    Temp: 98.2 °F (36.8 °C)  Height Method: Stated  Height: 6' 1" (185.4 cm)  Height (inches): 73 in  Weight Method: Bed Scale  Weight: 82.2 kg (181 lb 3.5 oz)  Weight (lb): 181.22 lb  Ideal Body Weight (IBW), Male: 184 lb  % Ideal Body Weight, Male (lb): 108.08 %  BMI (Calculated): 23.9     Lab/Procedures/Meds    Pertinent Labs Reviewed: reviewed  Pertinent Labs Comments: " Na 131, glucose 62  Pertinent Medications Reviewed: reviewed  Pertinent Medications Comments: coumadin, Mg oxide, pantoprazole, polyethylene glycol, mirtazapine    Estimated/Assessed Needs    Weight Used For Calorie Calculations: 82.1 kg (181 lb)  Energy Calorie Requirements (kcal): 2050 kcal  Energy Need Method: [place]-St Jeor (PAL 1.2)  Protein Requirements: 82-123g (1-1.2g/kg)  Weight Used For Protein Calculations: 82.1 kg (181 lb)  Fluid Requirements (mL): 1ml/kcal or per MD  Estimated Fluid Requirement Method: RDA Method  RDA Method (mL): 2050     Nutrition Prescription Ordered    Current Diet Order: Cardiac    Evaluation of Received Nutrient/Fluid Intake    I/O: + 3.4L since admit  Energy Calories Required: not meeting needs  Protein Required: not meeting needs  Comments: LBM 9/26  Tolerance: tolerating    Nutrition Risk    Level of Risk/Frequency of Follow-up: low     Monitor and Evaluation    Food and Nutrient Intake: energy intake, food and beverage intake  Food and Nutrient Adminstration: diet order  Physical Activity and Function: nutrition-related ADLs and IADLs  Anthropometric Measurements: height/length, weight, weight change, body mass index  Biochemical Data, Medical Tests and Procedures: electrolyte and renal panel, gastrointestinal profile, glucose/endocrine profile, inflammatory profile, lipid profile  Nutrition-Focused Physical Findings: overall appearance     Nutrition Follow-Up    RD Follow-up?: Yes    Curry ASH-BÁRBARA

## 2022-09-29 NOTE — SUBJECTIVE & OBJECTIVE
Interval History: No fevers documented overnight. Stepped down from ICU. Pt reports tolerating abx therapy. OR cx with GNR thus far. Old and new DLES dressed this morning per patient - denies drainage.       Review of Systems   Constitutional:  Negative for chills and fever.   All other systems reviewed and are negative.  Objective:     Vital Signs (Most Recent):  Temp: 97.8 °F (36.6 °C) (09/29/22 0753)  Pulse: 82 (09/29/22 0814)  Resp: 18 (09/29/22 0753)  BP: (!) 76/0 (09/29/22 0753)  SpO2: (!) 94 % (09/29/22 0814)   Vital Signs (24h Range):  Temp:  [97.1 °F (36.2 °C)-98.9 °F (37.2 °C)] 97.8 °F (36.6 °C)  Pulse:  [50-84] 82  Resp:  [17-27] 18  SpO2:  [93 %-99 %] 94 %  BP: ()/(0-58) 76/0  Arterial Line BP: (66-87)/(57-75) 71/61     Weight: 82.2 kg (181 lb 3.5 oz)  Body mass index is 23.91 kg/m².    Estimated Creatinine Clearance: 78.6 mL/min (based on SCr of 1.2 mg/dL).    Physical Exam  Constitutional:       General: He is not in acute distress.     Appearance: He is not ill-appearing or toxic-appearing.   HENT:      Head: Normocephalic and atraumatic.      Right Ear: External ear normal.      Left Ear: External ear normal.      Mouth/Throat:      Mouth: Mucous membranes are moist.      Pharynx: No oropharyngeal exudate or posterior oropharyngeal erythema.   Eyes:      General: No scleral icterus.        Left eye: No discharge.   Cardiovascular:      Comments: vad  Pulmonary:      Effort: Pulmonary effort is normal. No respiratory distress.      Breath sounds: No stridor. No wheezing or rhonchi.   Abdominal:      General: There is no distension.      Tenderness: There is no abdominal tenderness. There is no guarding.      Comments: LVAD - dressed  Prior LVAD site - dressed, c/d   Musculoskeletal:      Right lower leg: No edema.      Left lower leg: No edema.   Skin:     General: Skin is warm and dry.      Findings: No erythema.      Comments: R groin - wound vac  Rleg surgical site dressed  R drain present    Neurological:      Mental Status: He is alert and oriented to person, place, and time. Mental status is at baseline.      Motor: No weakness.   Psychiatric:         Mood and Affect: Mood normal.         Behavior: Behavior normal.       Significant Labs:   Microbiology Results (last 7 days)       Procedure Component Value Units Date/Time    Culture, Anaerobe [[id number]] Collected: 09/25/22 0815    Order Status: Completed Specimen: Wound from Groin Updated: 09/29/22 0932     Anaerobic Culture No anaerobes isolated    AFB Culture & Smear [[id number]] Collected: 09/27/22 0944    Order Status: Completed Specimen: Wound from Groin Updated: 09/29/22 0927     AFB Culture & Smear Culture in progress     AFB CULTURE STAIN No acid fast bacilli seen.    Narrative:      1. RIGHT GROIN WOUND    Blood culture [[id number]] Collected: 09/25/22 0138    Order Status: Completed Specimen: Blood from Peripheral, Hand, Right Updated: 09/29/22 0612     Blood Culture, Routine No Growth to date      No Growth to date      No Growth to date      No Growth to date      No Growth to date    Narrative:      Collection has been rescheduled by TR3 at 09/25/2022 00:31 Reason: Pt   dont wanna get stuck rn rachille  Collection has been rescheduled by TR3 at 09/25/2022 00:31 Reason: Pt   dont wanna get stuck rn rachille    Blood culture [[id number]] Collected: 09/25/22 0133    Order Status: Completed Specimen: Blood from Peripheral, Lower Arm, Left Updated: 09/29/22 0612     Blood Culture, Routine No Growth to date      No Growth to date      No Growth to date      No Growth to date      No Growth to date    Narrative:      Collection has been rescheduled by TR3 at 09/25/2022 00:31 Reason: Pt   dont wanna get stuck rn rachille  Collection has been rescheduled by TR3 at 09/25/2022 00:31 Reason: Pt   dont wanna get stuck rn rachille    AFB Culture & Smear [[id number]] Collected: 09/27/22 0944    Order Status: Completed Specimen: Wound from Groin Updated:  09/28/22 2127     AFB Culture & Smear Culture in progress     AFB CULTURE STAIN No acid fast bacilli seen.    Narrative:      2. RIGHT FEMORAL ARTERY    AFB Culture & Smear [[id number]] Collected: 09/27/22 0944    Order Status: Completed Specimen: Wound from Groin Updated: 09/28/22 2127     AFB Culture & Smear Culture in progress     AFB CULTURE STAIN No acid fast bacilli seen.    Narrative:      3. RIGHT FEMORAL GRAFT    AFB Culture & Smear [[id number]] Collected: 09/27/22 0944    Order Status: Completed Specimen: Wound from Groin Updated: 09/28/22 2127     AFB Culture & Smear Culture in progress     AFB CULTURE STAIN No acid fast bacilli seen.    Narrative:      4. FEMORAL TISSUE RIGHT GROIN    Aerobic culture [[id number]]  (Abnormal) Collected: 09/27/22 0944    Order Status: Completed Specimen: Wound from Groin Updated: 09/28/22 1025     Aerobic Bacterial Culture GRAM NEGATIVE LAURA  Rare  Identification and susceptibility pending      Narrative:      4. FEMORAL TISSUE RIGHT GROIN    Aerobic culture [[id number]]  (Abnormal) Collected: 09/27/22 0944    Order Status: Completed Specimen: Wound from Groin Updated: 09/28/22 1024     Aerobic Bacterial Culture GRAM NEGATIVE LAURA  Rare  Identification and susceptibility pending      Narrative:      3. RIGHT FEMORAL GRAFT    Aerobic culture [[id number]]  (Abnormal) Collected: 09/27/22 0944    Order Status: Completed Specimen: Wound from Groin Updated: 09/28/22 1009     Aerobic Bacterial Culture GRAM NEGATIVE LAURA  Moderate  Identification and susceptibility pending      Narrative:      1. RIGHT GROIN WOUND    Aerobic culture [[id number]]  (Abnormal) Collected: 09/27/22 0944    Order Status: Completed Specimen: Wound from Groin Updated: 09/28/22 0926     Aerobic Bacterial Culture GRAM NEGATIVE LAURA  Rare  Identification and susceptibility pending      Narrative:      2. RIGHT FEMORAL ARTERY    Culture, Anaerobe [[id number]] Collected: 09/27/22 0944    Order Status: Completed Specimen:  Wound from Groin Updated: 09/28/22 0738     Anaerobic Culture Culture in progress    Narrative:      3. RIGHT FEMORAL GRAFT    Culture, Anaerobe [[id number]] Collected: 09/27/22 0944    Order Status: Completed Specimen: Wound from Groin Updated: 09/28/22 0738     Anaerobic Culture Culture in progress    Narrative:      4. FEMORAL TISSUE RIGHT GROIN    Culture, Anaerobe [[id number]] Collected: 09/27/22 0944    Order Status: Completed Specimen: Wound from Groin Updated: 09/28/22 0738     Anaerobic Culture Culture in progress    Narrative:      2. RIGHT FEMORAL ARTERY    Culture, Anaerobe [[id number]] Collected: 09/27/22 0944    Order Status: Completed Specimen: Wound from Groin Updated: 09/28/22 0738     Anaerobic Culture Culture in progress    Narrative:      1. RIGHT GROIN WOUND    Gram stain [[id number]] Collected: 09/27/22 0944    Order Status: Completed Specimen: Wound from Groin Updated: 09/27/22 1312     Gram Stain Result Few WBC's      No organisms seen    Narrative:      1. RIGHT GROIN WOUND    Gram stain [[id number]] Collected: 09/27/22 0944    Order Status: Completed Specimen: Wound from Groin Updated: 09/27/22 1204     Gram Stain Result No WBC's      No organisms seen    Narrative:      2. RIGHT FEMORAL ARTERY    Gram stain [[id number]] Collected: 09/27/22 0944    Order Status: Completed Specimen: Wound from Groin Updated: 09/27/22 1203     Gram Stain Result No WBC's      No organisms seen    Narrative:      3. RIGHT FEMORAL GRAFT    Gram stain [[id number]] Collected: 09/27/22 0944    Order Status: Completed Specimen: Wound from Groin Updated: 09/27/22 1202     Gram Stain Result No WBC's      No organisms seen    Narrative:      4. FEMORAL TISSUE RIGHT GROIN    Fungus culture [[id number]] Collected: 09/27/22 0944    Order Status: Sent Specimen: Wound from Groin Updated: 09/27/22 1036    Fungus culture [[id number]] Collected: 09/27/22 0944    Order Status: Sent Specimen: Wound from Groin Updated: 09/27/22 1034     Fungus culture [[id number]] Collected: 09/27/22 0944    Order Status: Sent Specimen: Wound from Groin Updated: 09/27/22 1032    Fungus culture [[id number]] Collected: 09/27/22 0944    Order Status: Sent Specimen: Wound from Groin Updated: 09/27/22 1029    Aerobic culture [[id number]]  (Abnormal)  (Susceptibility) Collected: 09/25/22 0815    Order Status: Completed Specimen: Wound from Groin Updated: 09/27/22 0945     Aerobic Bacterial Culture ESCHERICHIA COLI ESBL  Few      Fungus culture [[id number]] Collected: 09/25/22 0815    Order Status: Completed Specimen: Wound from Groin Updated: 09/26/22 1150     Fungus (Mycology) Culture Culture in progress    Blood culture [[id number]]     Order Status: Canceled Specimen: Blood     Blood culture [[id number]]     Order Status: Canceled Specimen: Blood             Significant Imaging: I have reviewed all pertinent imaging results/findings within the past 24 hours.

## 2022-09-29 NOTE — ASSESSMENT & PLAN NOTE
Tim Richards is a 56 y.o. 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 Vascular surgery has been consulted for evaluation of right groin site concerning for pseudoaneurym. Patient is now s/p Rt groin exploration, creation of Right Ileofemoral bypass, and creation of muscle flap 9/27/22    Recommend:  - Infectious disease on the case, will f/u recommendations  - Recommend starting picc line so patient may continue to receive abx infusions even after discharge  - Wound culture sent for anaerobic, aerobic, and fungal   - preliminary growth of gram negative jodie  - ABx: daptomycin, meropenem  - Strict bedrest, head of bed less than 30°, keep right leg straight  - Blood cultures x2: NGTD  - Ok to restart anticoagulation from vascular perspective  - Anticoagulation per primary team

## 2022-09-29 NOTE — PLAN OF CARE
Plan of care discussed with patient. Patient AOX4 and VS stable. Patient free of falls and trauma. LVAD DP and numbers WNL, smooth LVAD hum. Patient has complaints of mild pain, treated with scheduled tylenol. Wound Vac still in place. CLEO drain still in place and emptied. Heparin gtt started on patient, running at 12 units/kg/hr. Given IV abx per order. Discussed medications and care. Patient has no questions at this time. Will continue to monitor.

## 2022-09-29 NOTE — SUBJECTIVE & OBJECTIVE
Medications:  Continuous Infusions:   heparin (porcine) in D5W       Scheduled Meds:   acetaminophen  1,000 mg Oral TID    amiodarone  400 mg Oral Daily    DAPTOmycin (CUBICIN) IV (PEDS and ADULTS)  6 mg/kg Intravenous Q24H    levothyroxine  50 mcg Oral Before breakfast    magnesium oxide  400 mg Oral Daily with lunch    meropenem (MERREM) IVPB  2 g Intravenous Q8H    mexiletine  250 mg Oral Q8H    mirtazapine  30 mg Oral QHS    pantoprazole  40 mg Oral Daily with lunch    polyethylene glycol  17 g Oral Daily     PRN Meds:sodium chloride, sodium chloride, sodium chloride 0.9%, sodium chloride 0.9%, ALPRAZolam, oxyCODONE, oxyCODONE, zolpidem     Objective:     Vital Signs (Most Recent):  Temp: 98 °F (36.7 °C) (09/29/22 0529)  Pulse: 77 (09/29/22 0529)  Resp: 18 (09/29/22 0529)  BP: (!) 70/0 (09/29/22 0529)  SpO2: 95 % (09/29/22 0529)   Vital Signs (24h Range):  Temp:  [97.1 °F (36.2 °C)-98.9 °F (37.2 °C)] 98 °F (36.7 °C)  Pulse:  [50-84] 77  Resp:  [17-27] 18  SpO2:  [93 %-99 %] 95 %  BP: ()/(0-58) 70/0  Arterial Line BP: (66-87)/(57-75) 71/61         Physical Exam  Constitutional:       Appearance: Normal appearance.   HENT:      Head: Normocephalic and atraumatic.      Mouth/Throat:      Mouth: Mucous membranes are moist.   Eyes:      Pupils: Pupils are equal, round, and reactive to light.   Cardiovascular:      Rate and Rhythm: Regular rhythm.   Abdominal:      General: Abdomen is flat.      Palpations: Abdomen is soft.   Musculoskeletal:      Comments: 5/5 strength all 4 extremities, biphasic waveforms DP bilaterally  Right groin area with wound vac and CLEO drain   Neurological:      Mental Status: He is alert.       Significant Labs:  CBC:   Recent Labs   Lab 09/29/22  0041   WBC 14.92*   RBC 3.37*   HGB 9.1*   HCT 30.9*      MCV 92   MCH 27.0   MCHC 29.4*     CMP:   Recent Labs   Lab 09/29/22  0451   GLU 62*   CALCIUM 9.0   ALBUMIN 2.2*   PROT 6.0   *   K 4.0   CO2 24      BUN 14    CREATININE 1.2   ALKPHOS 87   ALT 17   AST 27   BILITOT 0.6     Coagulation:   Recent Labs   Lab 09/29/22  0451   LABPROT 11.4   INR 1.1   APTT 32.4*     Lactic Acid: No results for input(s): LACTATE in the last 48 hours.  All pertinent labs from the last 24 hours have been reviewed.    Significant Diagnostics:  I have reviewed all pertinent imaging results/findings within the past 24 hours.

## 2022-09-29 NOTE — PLAN OF CARE
Recommendations    1. Continue Cardiac diet      2. Add Boost Plus TID (chocolate) to optimize nutrient intake     3. RD to monitor and follow    Goals: Meet % EEN, EPN by RD f/u  Nutrition Goal Status: new  Communication of RD Recs:  (POC)

## 2022-09-29 NOTE — CONSULTS
Double lumen PICC to right brachial vein.  42 cm in length, 32 cm arm circumference and 0 cm exposed.   Lot # [id number].

## 2022-09-30 ENCOUNTER — TELEPHONE (OUTPATIENT)
Dept: TRANSPLANT | Facility: CLINIC | Age: 57
End: 2022-09-30
Payer: COMMERCIAL

## 2022-09-30 LAB
ALBUMIN SERPL BCP-MCNC: 2 G/DL (ref 3.5–5.2)
ALP SERPL-CCNC: 85 U/L (ref 55–135)
ALT SERPL W/O P-5'-P-CCNC: 17 U/L (ref 10–44)
ANION GAP SERPL CALC-SCNC: 7 MMOL/L (ref 8–16)
APTT BLDCRRT: 40.7 SEC (ref 21–32)
AST SERPL-CCNC: 24 U/L (ref 10–40)
BACTERIA BLD CULT: NORMAL
BACTERIA BLD CULT: NORMAL
BASOPHILS # BLD AUTO: 0.02 K/UL (ref 0–0.2)
BASOPHILS NFR BLD: 0.2 % (ref 0–1.9)
BILIRUB DIRECT SERPL-MCNC: 0.3 MG/DL (ref 0.1–0.3)
BILIRUB SERPL-MCNC: 0.5 MG/DL (ref 0.1–1)
BNP SERPL-MCNC: 179 PG/ML (ref 0–99)
BUN SERPL-MCNC: 13 MG/DL (ref 6–20)
CALCIUM SERPL-MCNC: 8.8 MG/DL (ref 8.7–10.5)
CHLORIDE SERPL-SCNC: 104 MMOL/L (ref 95–110)
CO2 SERPL-SCNC: 25 MMOL/L (ref 23–29)
CREAT SERPL-MCNC: 1.2 MG/DL (ref 0.5–1.4)
CRP SERPL-MCNC: 214.3 MG/L (ref 0–8.2)
DIFFERENTIAL METHOD: ABNORMAL
EOSINOPHIL # BLD AUTO: 0.2 K/UL (ref 0–0.5)
EOSINOPHIL # BLD AUTO: 0.3 K/UL (ref 0–0.5)
EOSINOPHIL # BLD AUTO: 0.3 K/UL (ref 0–0.5)
EOSINOPHIL NFR BLD: 2.1 % (ref 0–8)
EOSINOPHIL NFR BLD: 2.7 % (ref 0–8)
EOSINOPHIL NFR BLD: 2.7 % (ref 0–8)
ERYTHROCYTE [DISTWIDTH] IN BLOOD BY AUTOMATED COUNT: 14.8 % (ref 11.5–14.5)
EST. GFR  (NO RACE VARIABLE): >60 ML/MIN/1.73 M^2
GLUCOSE SERPL-MCNC: 76 MG/DL (ref 70–110)
HCT VFR BLD AUTO: 29.4 % (ref 40–54)
HCT VFR BLD AUTO: 29.9 % (ref 40–54)
HCT VFR BLD AUTO: 29.9 % (ref 40–54)
HGB BLD-MCNC: 8.6 G/DL (ref 14–18)
HGB BLD-MCNC: 8.6 G/DL (ref 14–18)
HGB BLD-MCNC: 8.7 G/DL (ref 14–18)
IMM GRANULOCYTES # BLD AUTO: 0.09 K/UL (ref 0–0.04)
IMM GRANULOCYTES # BLD AUTO: 0.09 K/UL (ref 0–0.04)
IMM GRANULOCYTES # BLD AUTO: 0.11 K/UL (ref 0–0.04)
IMM GRANULOCYTES NFR BLD AUTO: 0.9 % (ref 0–0.5)
IMM GRANULOCYTES NFR BLD AUTO: 0.9 % (ref 0–0.5)
IMM GRANULOCYTES NFR BLD AUTO: 1 % (ref 0–0.5)
INR PPP: 1.1 (ref 0.8–1.2)
LDH SERPL L TO P-CCNC: 273 U/L (ref 110–260)
LYMPHOCYTES # BLD AUTO: 0.9 K/UL (ref 1–4.8)
LYMPHOCYTES # BLD AUTO: 1.3 K/UL (ref 1–4.8)
LYMPHOCYTES # BLD AUTO: 1.3 K/UL (ref 1–4.8)
LYMPHOCYTES NFR BLD: 12.7 % (ref 18–48)
LYMPHOCYTES NFR BLD: 12.7 % (ref 18–48)
LYMPHOCYTES NFR BLD: 8.6 % (ref 18–48)
MAGNESIUM SERPL-MCNC: 2 MG/DL (ref 1.6–2.6)
MCH RBC QN AUTO: 27.2 PG (ref 27–31)
MCH RBC QN AUTO: 27.2 PG (ref 27–31)
MCH RBC QN AUTO: 27.3 PG (ref 27–31)
MCHC RBC AUTO-ENTMCNC: 28.8 G/DL (ref 32–36)
MCHC RBC AUTO-ENTMCNC: 28.8 G/DL (ref 32–36)
MCHC RBC AUTO-ENTMCNC: 29.6 G/DL (ref 32–36)
MCV RBC AUTO: 92 FL (ref 82–98)
MCV RBC AUTO: 95 FL (ref 82–98)
MCV RBC AUTO: 95 FL (ref 82–98)
MONOCYTES # BLD AUTO: 1 K/UL (ref 0.3–1)
MONOCYTES NFR BLD: 9.8 % (ref 4–15)
MONOCYTES NFR BLD: 9.9 % (ref 4–15)
MONOCYTES NFR BLD: 9.9 % (ref 4–15)
NEUTROPHILS # BLD AUTO: 7.5 K/UL (ref 1.8–7.7)
NEUTROPHILS # BLD AUTO: 7.5 K/UL (ref 1.8–7.7)
NEUTROPHILS # BLD AUTO: 8.2 K/UL (ref 1.8–7.7)
NEUTROPHILS NFR BLD: 73.6 % (ref 38–73)
NEUTROPHILS NFR BLD: 73.6 % (ref 38–73)
NEUTROPHILS NFR BLD: 78.3 % (ref 38–73)
NRBC BLD-RTO: 0 /100 WBC
PHOSPHATE SERPL-MCNC: 2.3 MG/DL (ref 2.7–4.5)
PLATELET # BLD AUTO: 221 K/UL (ref 150–450)
PLATELET # BLD AUTO: 221 K/UL (ref 150–450)
PLATELET # BLD AUTO: 232 K/UL (ref 150–450)
PMV BLD AUTO: 10.5 FL (ref 9.2–12.9)
PMV BLD AUTO: 9.8 FL (ref 9.2–12.9)
PMV BLD AUTO: 9.8 FL (ref 9.2–12.9)
POTASSIUM SERPL-SCNC: 4 MMOL/L (ref 3.5–5.1)
PREALB SERPL-MCNC: 11 MG/DL (ref 20–43)
PROT SERPL-MCNC: 5.8 G/DL (ref 6–8.4)
PROTHROMBIN TIME: 10.9 SEC (ref 9–12.5)
RBC # BLD AUTO: 3.16 M/UL (ref 4.6–6.2)
RBC # BLD AUTO: 3.16 M/UL (ref 4.6–6.2)
RBC # BLD AUTO: 3.19 M/UL (ref 4.6–6.2)
SODIUM SERPL-SCNC: 136 MMOL/L (ref 136–145)
WBC # BLD AUTO: 10.21 K/UL (ref 3.9–12.7)
WBC # BLD AUTO: 10.21 K/UL (ref 3.9–12.7)
WBC # BLD AUTO: 10.48 K/UL (ref 3.9–12.7)

## 2022-09-30 PROCEDURE — 99233 SBSQ HOSP IP/OBS HIGH 50: CPT | Mod: ,,, | Performed by: PHYSICIAN ASSISTANT

## 2022-09-30 PROCEDURE — 94761 N-INVAS EAR/PLS OXIMETRY MLT: CPT

## 2022-09-30 PROCEDURE — 85025 COMPLETE CBC W/AUTO DIFF WBC: CPT | Performed by: NURSE PRACTITIONER

## 2022-09-30 PROCEDURE — 97116 GAIT TRAINING THERAPY: CPT

## 2022-09-30 PROCEDURE — 99233 SBSQ HOSP IP/OBS HIGH 50: CPT | Mod: ,,, | Performed by: STUDENT IN AN ORGANIZED HEALTH CARE EDUCATION/TRAINING PROGRAM

## 2022-09-30 PROCEDURE — 99900035 HC TECH TIME PER 15 MIN (STAT)

## 2022-09-30 PROCEDURE — 27000248 HC VAD-ADDITIONAL DAY

## 2022-09-30 PROCEDURE — 97530 THERAPEUTIC ACTIVITIES: CPT

## 2022-09-30 PROCEDURE — 84134 ASSAY OF PREALBUMIN: CPT | Performed by: PHYSICIAN ASSISTANT

## 2022-09-30 PROCEDURE — 93750 PR INTERROGATE VENT ASSIST DEV, IN PERSON, W PHYSICIAN ANALYSIS: ICD-10-PCS | Mod: ,,, | Performed by: INTERNAL MEDICINE

## 2022-09-30 PROCEDURE — A4216 STERILE WATER/SALINE, 10 ML: HCPCS | Performed by: INTERNAL MEDICINE

## 2022-09-30 PROCEDURE — 25000003 PHARM REV CODE 250: Performed by: INTERNAL MEDICINE

## 2022-09-30 PROCEDURE — 25000003 PHARM REV CODE 250: Performed by: STUDENT IN AN ORGANIZED HEALTH CARE EDUCATION/TRAINING PROGRAM

## 2022-09-30 PROCEDURE — 97162 PT EVAL MOD COMPLEX 30 MIN: CPT

## 2022-09-30 PROCEDURE — 97605 NEG PRS WND THER DME<=50SQCM: CPT

## 2022-09-30 PROCEDURE — 80076 HEPATIC FUNCTION PANEL: CPT | Performed by: NURSE PRACTITIONER

## 2022-09-30 PROCEDURE — 99233 PR SUBSEQUENT HOSPITAL CARE,LEVL III: ICD-10-PCS | Mod: ,,, | Performed by: STUDENT IN AN ORGANIZED HEALTH CARE EDUCATION/TRAINING PROGRAM

## 2022-09-30 PROCEDURE — 63600175 PHARM REV CODE 636 W HCPCS: Performed by: PHYSICIAN ASSISTANT

## 2022-09-30 PROCEDURE — 83735 ASSAY OF MAGNESIUM: CPT | Performed by: NURSE PRACTITIONER

## 2022-09-30 PROCEDURE — 86140 C-REACTIVE PROTEIN: CPT | Performed by: PHYSICIAN ASSISTANT

## 2022-09-30 PROCEDURE — 93750 INTERROGATION VAD IN PERSON: CPT | Mod: ,,, | Performed by: INTERNAL MEDICINE

## 2022-09-30 PROCEDURE — 99233 PR SUBSEQUENT HOSPITAL CARE,LEVL III: ICD-10-PCS | Mod: ,,, | Performed by: PHYSICIAN ASSISTANT

## 2022-09-30 PROCEDURE — 20600001 HC STEP DOWN PRIVATE ROOM

## 2022-09-30 PROCEDURE — 83880 ASSAY OF NATRIURETIC PEPTIDE: CPT | Performed by: NURSE PRACTITIONER

## 2022-09-30 PROCEDURE — 85730 THROMBOPLASTIN TIME PARTIAL: CPT | Performed by: PHYSICIAN ASSISTANT

## 2022-09-30 PROCEDURE — 85610 PROTHROMBIN TIME: CPT | Performed by: STUDENT IN AN ORGANIZED HEALTH CARE EDUCATION/TRAINING PROGRAM

## 2022-09-30 PROCEDURE — 25000003 PHARM REV CODE 250: Performed by: NURSE PRACTITIONER

## 2022-09-30 PROCEDURE — 84100 ASSAY OF PHOSPHORUS: CPT | Performed by: PHYSICIAN ASSISTANT

## 2022-09-30 PROCEDURE — 97165 OT EVAL LOW COMPLEX 30 MIN: CPT

## 2022-09-30 PROCEDURE — 97535 SELF CARE MNGMENT TRAINING: CPT

## 2022-09-30 PROCEDURE — 25000003 PHARM REV CODE 250: Performed by: PHYSICIAN ASSISTANT

## 2022-09-30 PROCEDURE — 63600175 PHARM REV CODE 636 W HCPCS: Performed by: STUDENT IN AN ORGANIZED HEALTH CARE EDUCATION/TRAINING PROGRAM

## 2022-09-30 PROCEDURE — 83615 LACTATE (LD) (LDH) ENZYME: CPT | Performed by: NURSE PRACTITIONER

## 2022-09-30 PROCEDURE — 27000207 HC ISOLATION

## 2022-09-30 PROCEDURE — 80048 BASIC METABOLIC PNL TOTAL CA: CPT | Performed by: NURSE PRACTITIONER

## 2022-09-30 RX ORDER — WARFARIN SODIUM 5 MG/1
5 TABLET ORAL DAILY
Status: DISCONTINUED | OUTPATIENT
Start: 2022-10-01 | End: 2022-10-05

## 2022-09-30 RX ORDER — LACTULOSE 10 G/15ML
15 SOLUTION ORAL 3 TIMES DAILY
Status: DISCONTINUED | OUTPATIENT
Start: 2022-09-30 | End: 2022-10-02

## 2022-09-30 RX ORDER — WARFARIN 7.5 MG/1
7.5 TABLET ORAL ONCE
Status: COMPLETED | OUTPATIENT
Start: 2022-09-30 | End: 2022-09-30

## 2022-09-30 RX ADMIN — LACTULOSE 15 G: 20 SOLUTION ORAL at 11:09

## 2022-09-30 RX ADMIN — ERTAPENEM 1 G: 1 INJECTION INTRAMUSCULAR; INTRAVENOUS at 01:09

## 2022-09-30 RX ADMIN — ACETAMINOPHEN 1000 MG: 500 TABLET ORAL at 09:09

## 2022-09-30 RX ADMIN — HEPARIN SODIUM 12 UNITS/KG/HR: 10000 INJECTION, SOLUTION INTRAVENOUS at 07:09

## 2022-09-30 RX ADMIN — Medication 10 ML: at 05:09

## 2022-09-30 RX ADMIN — PANTOPRAZOLE SODIUM 40 MG: 40 TABLET, DELAYED RELEASE ORAL at 11:09

## 2022-09-30 RX ADMIN — MEXILETINE HYDROCHLORIDE 250 MG: 250 CAPSULE ORAL at 07:09

## 2022-09-30 RX ADMIN — WARFARIN SODIUM 7.5 MG: 7.5 TABLET ORAL at 05:09

## 2022-09-30 RX ADMIN — Medication 400 MG: at 11:09

## 2022-09-30 RX ADMIN — MIRTAZAPINE 30 MG: 30 TABLET, FILM COATED ORAL at 09:09

## 2022-09-30 RX ADMIN — AMIODARONE HYDROCHLORIDE 400 MG: 200 TABLET ORAL at 09:09

## 2022-09-30 RX ADMIN — ALPRAZOLAM 1 MG: 0.5 TABLET ORAL at 09:09

## 2022-09-30 RX ADMIN — ACETAMINOPHEN 1000 MG: 500 TABLET ORAL at 04:09

## 2022-09-30 RX ADMIN — Medication 10 ML: at 06:09

## 2022-09-30 RX ADMIN — LEVOTHYROXINE SODIUM 50 MCG: 50 TABLET ORAL at 05:09

## 2022-09-30 RX ADMIN — MEROPENEM 2 G: 1 INJECTION INTRAVENOUS at 05:09

## 2022-09-30 RX ADMIN — Medication 10 ML: at 01:09

## 2022-09-30 RX ADMIN — MEXILETINE HYDROCHLORIDE 250 MG: 250 CAPSULE ORAL at 09:09

## 2022-09-30 RX ADMIN — MEXILETINE HYDROCHLORIDE 250 MG: 250 CAPSULE ORAL at 04:09

## 2022-09-30 NOTE — ASSESSMENT & PLAN NOTE
-NICM   -Echo 8/30/22 EF: 10%, LVEDD: 7.44  -Patient was on Torsemide prior to admit: 40mg Qdaily   -GDMT: N/A  -Euvolemic on exam,   -Maintain K>4.0, Mg>2.0

## 2022-09-30 NOTE — PLAN OF CARE
Problem: Occupational Therapy  Goal: Occupational Therapy Goal  Description: Goals to be met by: 14 days 10/14/22     Patient will increase functional independence with ADLs by performing:    Pt to complete UE dressing with set-up  Pt to complete LE dressing with MIN A with AE as needed  Pt to complete toileting with SBA  Pt to complete t/f commode with SBA  Pt to complete standing g/h skills with supervision.   Outcome: Ongoing, Progressing

## 2022-09-30 NOTE — NURSING
"Patient refusing miralax stating "it made my stomach upset." Patient has not had a BM. States that he feels the need to, but refuses to get out of bed with primary nurse and tech, would like PT to assist. PT contacted and stated that they will come to bedside.   "

## 2022-09-30 NOTE — ASSESSMENT & PLAN NOTE
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 Distal External Iliac to distal femoral artery  -Wound vac in place

## 2022-09-30 NOTE — ASSESSMENT & PLAN NOTE
-Was on VA-ECMO at last hospitalization with successful decannulation  -CTA revealed 3 possible new pseudoaneurysms  -INR 3 on admission, since reversed with PO vit K and FFP.  Holding A/C until further instruction from surgical team regarding resumption of A/C.   -Hgb was trending down since admit. Transfusing to keep Hgb > 10  -Cultures from right groin wound with ESBL E Coli, per ID hold dapto and continue on ertapenem for total of 6 weeks.

## 2022-09-30 NOTE — NURSING
Patient wife left note and stated that she took battery #6 home SN# [id number]. There is four batteries in the  at bedside.

## 2022-09-30 NOTE — SUBJECTIVE & OBJECTIVE
**Interval History: Post op day 3. Doing well without any evidence of bleeding. Pain under control. Hasn't had a BM postoperatively and reports little gas.     Continuous Infusions:   heparin (porcine) in D5W 12 Units/kg/hr (09/30/22 0709)     Scheduled Meds:   acetaminophen  1,000 mg Oral TID    amiodarone  400 mg Oral Daily    ertapenem (INVANZ) IVPB  1 g Intravenous Q24H    lactulose  15 g Oral TID    levothyroxine  50 mcg Oral Before breakfast    magnesium oxide  400 mg Oral Daily with lunch    mexiletine  250 mg Oral Q8H    mirtazapine  30 mg Oral QHS    pantoprazole  40 mg Oral Daily with lunch    polyethylene glycol  17 g Oral Daily    sodium chloride 0.9%  10 mL Intravenous Q6H    [START ON 10/1/2022] warfarin  5 mg Oral Daily    warfarin  7.5 mg Oral Once     PRN Meds:sodium chloride, sodium chloride, sodium chloride 0.9%, sodium chloride 0.9%, ALPRAZolam, oxyCODONE, oxyCODONE, Flushing PICC Protocol **AND** sodium chloride 0.9% **AND** sodium chloride 0.9%, zolpidem    Review of patient's allergies indicates:   Allergen Reactions    Lisinopril Anaphylaxis    Hydralazine analogues      Chronic constipation, impotence, dizziness     Objective:     Vital Signs (Most Recent):  Temp: 98.9 °F (37.2 °C) (09/30/22 0915)  Pulse: 70 (09/30/22 0936)  Resp: 17 (09/30/22 0936)  BP: 98/80 (09/30/22 0915)  SpO2: 97 % (09/30/22 0936)   Vital Signs (24h Range):  Temp:  [98.2 °F (36.8 °C)-98.9 °F (37.2 °C)] 98.9 °F (37.2 °C)  Pulse:  [60-89] 70  Resp:  [16-18] 17  SpO2:  [92 %-97 %] 97 %  BP: (72-98)/(0-80) 98/80     Patient Vitals for the past 72 hrs (Last 3 readings):   Weight   09/30/22 0528 86 kg (189 lb 9.5 oz)   09/29/22 0529 82.2 kg (181 lb 3.5 oz)   09/28/22 0301 90 kg (198 lb 6.6 oz)       Body mass index is 25.01 kg/m².      Intake/Output Summary (Last 24 hours) at 9/30/2022 1054  Last data filed at 9/30/2022 0528  Gross per 24 hour   Intake 600 ml   Output 910 ml   Net -310 ml         Hemodynamic Parameters:        Telemetry: SR    Physical Exam  Constitutional:       Appearance: Normal appearance.   HENT:      Head: Normocephalic and atraumatic.   Eyes:      Extraocular Movements: Extraocular movements intact.      Pupils: Pupils are equal, round, and reactive to light.   Neck:      Comments: Neck veins are not elevated  Cardiovascular:      Rate and Rhythm: Normal rate and regular rhythm.      Comments: Smooth VAD hum  Pulmonary:      Effort: Pulmonary effort is normal.      Breath sounds: Normal breath sounds.   Abdominal:      General: Bowel sounds are normal. There is no distension.      Palpations: Abdomen is soft.      Tenderness: There is no abdominal tenderness.      Comments: DLES dressing c/d/i   Musculoskeletal:         General: No swelling. Normal range of motion.      Cervical back: Normal range of motion and neck supple.      Right lower leg: No edema.      Left lower leg: No edema.      Comments: R groin with wound vac and CLEO drain. No drainage or bleeding.    Skin:     General: Skin is warm and dry.      Capillary Refill: Capillary refill takes 2 to 3 seconds.   Neurological:      General: No focal deficit present.      Mental Status: He is alert and oriented to person, place, and time.   Psychiatric:         Mood and Affect: Mood normal.         Behavior: Behavior normal.         Thought Content: Thought content normal.         Judgment: Judgment normal.       Significant Labs:  CBC:  Recent Labs   Lab 09/29/22  0744 09/29/22  1448 09/30/22  0546   WBC 13.52* 11.87 10.48   RBC 3.57* 3.19* 3.19*   HGB 9.8* 8.8* 8.7*   HCT 33.4* 29.1* 29.4*    219 232   MCV 94 91 92   MCH 27.5 27.6 27.3   MCHC 29.3* 30.2* 29.6*       BNP:  Recent Labs   Lab 09/24/22  1803 09/26/22  0353 09/28/22  0332   * 97 787*       CMP:  Recent Labs   Lab 09/28/22  0332 09/29/22  0451 09/30/22  0546   GLU 80 62* 76   CALCIUM 8.6* 9.0 8.8   ALBUMIN 2.2* 2.2* 2.0*   PROT 5.5* 6.0 5.8*   * 131* 136   K 4.9 4.0 4.0   CO2  22* 24 25    102 104   BUN 14 14 13   CREATININE 1.1 1.2 1.2   ALKPHOS 75 87 85   ALT 15 17 17   AST 31 27 24   BILITOT 0.5 0.6 0.5        Coagulation:   Recent Labs   Lab 09/28/22  0332 09/29/22  0451 09/29/22  0740 09/29/22  1448 09/29/22  2129 09/30/22  0546   INR 1.2 1.1  --   --   --  1.1   APTT  --  32.4*   < > 39.9* 39.0* 40.7*    < > = values in this interval not displayed.       LDH:  Recent Labs   Lab 09/28/22  0332 09/29/22  0451 09/30/22  0546   * 308* 273*       Microbiology:  Microbiology Results (last 7 days)       Procedure Component Value Units Date/Time    Blood culture [[id number]] Collected: 09/25/22 0138    Order Status: Completed Specimen: Blood from Peripheral, Hand, Right Updated: 09/30/22 0612     Blood Culture, Routine No growth after 5 days.    Narrative:      Collection has been rescheduled by TR3 at 09/25/2022 00:31 Reason: Pt   dont wanna get stuck rn rachille  Collection has been rescheduled by TR3 at 09/25/2022 00:31 Reason: Pt   dont wanna get stuck rn rachille    Blood culture [[id number]] Collected: 09/25/22 0133    Order Status: Completed Specimen: Blood from Peripheral, Lower Arm, Left Updated: 09/30/22 0612     Blood Culture, Routine No growth after 5 days.    Narrative:      Collection has been rescheduled by TR3 at 09/25/2022 00:31 Reason: Pt   dont wanna get stuck rn rachille  Collection has been rescheduled by TR3 at 09/25/2022 00:31 Reason: Pt   dont wanna get stuck rn rachille    Aerobic culture [[id number]]  (Abnormal)  (Susceptibility) Collected: 09/27/22 0944    Order Status: Completed Specimen: Wound from Groin Updated: 09/29/22 1300     Aerobic Bacterial Culture ESCHERICHIA COLI  Rare      Narrative:      4. FEMORAL TISSUE RIGHT GROIN    Aerobic culture [[id number]]  (Abnormal)  (Susceptibility) Collected: 09/27/22 0944    Order Status: Completed Specimen: Wound from Groin Updated: 09/29/22 1251     Aerobic Bacterial Culture ESCHERICHIA COLI ESBL  Rare       Narrative:      3. RIGHT FEMORAL GRAFT    Aerobic culture [[id number]]  (Abnormal)  (Susceptibility) Collected: 09/27/22 0944    Order Status: Completed Specimen: Wound from Groin Updated: 09/29/22 1256     Aerobic Bacterial Culture ESCHERICHIA COLI ESBL  Moderate      Narrative:      1. RIGHT GROIN WOUND    Aerobic culture [[id number]]  (Abnormal)  (Susceptibility) Collected: 09/27/22 0944    Order Status: Completed Specimen: Wound from Groin Updated: 09/29/22 1248     Aerobic Bacterial Culture CITROBACTER FARMERI  Rare      Narrative:      2. RIGHT FEMORAL ARTERY    Culture, Anaerobe [[id number]] Collected: 09/27/22 0944    Order Status: Completed Specimen: Wound from Groin Updated: 09/29/22 1059     Anaerobic Culture Culture in progress    Narrative:      4. FEMORAL TISSUE RIGHT GROIN    Culture, Anaerobe [[id number]] Collected: 09/27/22 0944    Order Status: Completed Specimen: Wound from Groin Updated: 09/29/22 1058     Anaerobic Culture Culture in progress    Narrative:      3. RIGHT FEMORAL GRAFT    Culture, Anaerobe [[id number]] Collected: 09/27/22 0944    Order Status: Completed Specimen: Wound from Groin Updated: 09/29/22 1057     Anaerobic Culture Culture in progress    Narrative:      2. RIGHT FEMORAL ARTERY    Culture, Anaerobe [[id number]] Collected: 09/27/22 0944    Order Status: Completed Specimen: Wound from Groin Updated: 09/29/22 1056     Anaerobic Culture Culture in progress    Narrative:      1. RIGHT GROIN WOUND    Culture, Anaerobe [[id number]] Collected: 09/25/22 0815    Order Status: Completed Specimen: Wound from Groin Updated: 09/29/22 0932     Anaerobic Culture No anaerobes isolated    AFB Culture & Smear [[id number]] Collected: 09/27/22 0944    Order Status: Completed Specimen: Wound from Groin Updated: 09/29/22 0927     AFB Culture & Smear Culture in progress     AFB CULTURE STAIN No acid fast bacilli seen.    Narrative:      1. RIGHT GROIN WOUND    AFB Culture & Smear [[id number]] Collected:  09/27/22 0944    Order Status: Completed Specimen: Wound from Groin Updated: 09/28/22 2127     AFB Culture & Smear Culture in progress     AFB CULTURE STAIN No acid fast bacilli seen.    Narrative:      2. RIGHT FEMORAL ARTERY    AFB Culture & Smear [[id number]] Collected: 09/27/22 0944    Order Status: Completed Specimen: Wound from Groin Updated: 09/28/22 2127     AFB Culture & Smear Culture in progress     AFB CULTURE STAIN No acid fast bacilli seen.    Narrative:      3. RIGHT FEMORAL GRAFT    AFB Culture & Smear [[id number]] Collected: 09/27/22 0944    Order Status: Completed Specimen: Wound from Groin Updated: 09/28/22 2127     AFB Culture & Smear Culture in progress     AFB CULTURE STAIN No acid fast bacilli seen.    Narrative:      4. FEMORAL TISSUE RIGHT GROIN    Gram stain [[id number]] Collected: 09/27/22 0944    Order Status: Completed Specimen: Wound from Groin Updated: 09/27/22 1312     Gram Stain Result Few WBC's      No organisms seen    Narrative:      1. RIGHT GROIN WOUND    Gram stain [[id number]] Collected: 09/27/22 0944    Order Status: Completed Specimen: Wound from Groin Updated: 09/27/22 1204     Gram Stain Result No WBC's      No organisms seen    Narrative:      2. RIGHT FEMORAL ARTERY    Gram stain [[id number]] Collected: 09/27/22 0944    Order Status: Completed Specimen: Wound from Groin Updated: 09/27/22 1203     Gram Stain Result No WBC's      No organisms seen    Narrative:      3. RIGHT FEMORAL GRAFT    Gram stain [[id number]] Collected: 09/27/22 0944    Order Status: Completed Specimen: Wound from Groin Updated: 09/27/22 1202     Gram Stain Result No WBC's      No organisms seen    Narrative:      4. FEMORAL TISSUE RIGHT GROIN    Fungus culture [[id number]] Collected: 09/27/22 0944    Order Status: Sent Specimen: Wound from Groin Updated: 09/27/22 1036    Fungus culture [[id number]] Collected: 09/27/22 0944    Order Status: Sent Specimen: Wound from Groin Updated: 09/27/22 1034    Fungus  culture [[id number]] Collected: 09/27/22 0944    Order Status: Sent Specimen: Wound from Groin Updated: 09/27/22 1032    Fungus culture [[id number]] Collected: 09/27/22 0944    Order Status: Sent Specimen: Wound from Groin Updated: 09/27/22 1029    Aerobic culture [[id number]]  (Abnormal)  (Susceptibility) Collected: 09/25/22 0815    Order Status: Completed Specimen: Wound from Groin Updated: 09/27/22 0945     Aerobic Bacterial Culture ESCHERICHIA COLI ESBL  Few      Fungus culture [[id number]] Collected: 09/25/22 0815    Order Status: Completed Specimen: Wound from Groin Updated: 09/26/22 1150     Fungus (Mycology) Culture Culture in progress    Blood culture [[id number]]     Order Status: Canceled Specimen: Blood     Blood culture [[id number]]     Order Status: Canceled Specimen: Blood             I have reviewed all pertinent labs within the past 24 hours.    Estimated Creatinine Clearance: 78.6 mL/min (based on SCr of 1.2 mg/dL).    Diagnostic Results:  I have reviewed and interpreted all pertinent imaging results/findings within the past 24 hours.

## 2022-09-30 NOTE — SUBJECTIVE & OBJECTIVE
Interval History: No fevers documented overnight. Pt reports tolerating abx without issues. OR cx with ESBL Ecoli and Citrobacter.         Review of Systems   Constitutional:  Negative for chills and fever.   Gastrointestinal:  Negative for abdominal pain, diarrhea, nausea and vomiting.   Musculoskeletal:  Negative for myalgias.   All other systems reviewed and are negative.  Objective:     Vital Signs (Most Recent):  Temp: 98.6 °F (37 °C) (09/30/22 0528)  Pulse: 89 (09/30/22 0600)  Resp: 17 (09/30/22 0528)  BP: (!) 98/57 (09/30/22 0528)  SpO2: 96 % (09/30/22 0528)   Vital Signs (24h Range):  Temp:  [98.2 °F (36.8 °C)-98.8 °F (37.1 °C)] 98.6 °F (37 °C)  Pulse:  [60-89] 89  Resp:  [16-18] 17  SpO2:  [92 %-97 %] 96 %  BP: (72-98)/(0-57) 98/57     Weight: 86 kg (189 lb 9.5 oz)  Body mass index is 25.01 kg/m².    Estimated Creatinine Clearance: 78.6 mL/min (based on SCr of 1.2 mg/dL).    Physical Exam  Constitutional:       General: He is not in acute distress.     Appearance: He is not ill-appearing, toxic-appearing or diaphoretic.   HENT:      Head: Normocephalic and atraumatic.      Right Ear: External ear normal.      Left Ear: External ear normal.      Mouth/Throat:      Mouth: Mucous membranes are moist.      Pharynx: No oropharyngeal exudate or posterior oropharyngeal erythema.   Eyes:      General: No scleral icterus.        Right eye: No discharge.         Left eye: No discharge.   Neck:      Comments: Trach capped  Cardiovascular:      Comments: vad  Pulmonary:      Effort: Pulmonary effort is normal. No respiratory distress.      Breath sounds: No stridor. No wheezing or rhonchi.   Abdominal:      General: There is no distension.      Tenderness: There is no abdominal tenderness. There is no guarding.      Comments: LVAD - dressed  Prior LVAD site - dressed, c/d   Musculoskeletal:      Right lower leg: No edema.      Left lower leg: No edema.   Skin:     General: Skin is warm and dry.      Coloration: Skin is  not jaundiced.      Findings: No bruising or erythema.      Comments: R groin - wound vac  Rleg surgical site dressed  CLEO drain   Neurological:      Mental Status: He is alert and oriented to person, place, and time. Mental status is at baseline.      Motor: No weakness.   Psychiatric:         Mood and Affect: Mood normal.         Behavior: Behavior normal.       Significant Labs:   Microbiology Results (last 7 days)       Procedure Component Value Units Date/Time    Blood culture [[id number]] Collected: 09/25/22 0138    Order Status: Completed Specimen: Blood from Peripheral, Hand, Right Updated: 09/30/22 0612     Blood Culture, Routine No growth after 5 days.    Narrative:      Collection has been rescheduled by TR3 at 09/25/2022 00:31 Reason: Pt   dont wanna get stuck rn rachille  Collection has been rescheduled by TR3 at 09/25/2022 00:31 Reason: Pt   dont wanna get stuck rn rachille    Blood culture [[id number]] Collected: 09/25/22 0133    Order Status: Completed Specimen: Blood from Peripheral, Lower Arm, Left Updated: 09/30/22 0612     Blood Culture, Routine No growth after 5 days.    Narrative:      Collection has been rescheduled by TR3 at 09/25/2022 00:31 Reason: Pt   dont wanna get stuck rn rachille  Collection has been rescheduled by TR3 at 09/25/2022 00:31 Reason: Pt   dont wanna get stuck rn rachille    Aerobic culture [[id number]]  (Abnormal)  (Susceptibility) Collected: 09/27/22 0944    Order Status: Completed Specimen: Wound from Groin Updated: 09/29/22 1300     Aerobic Bacterial Culture ESCHERICHIA COLI  Rare      Narrative:      4. FEMORAL TISSUE RIGHT GROIN    Aerobic culture [[id number]]  (Abnormal)  (Susceptibility) Collected: 09/27/22 0944    Order Status: Completed Specimen: Wound from Groin Updated: 09/29/22 1259     Aerobic Bacterial Culture ESCHERICHIA COLI ESBL  Rare      Narrative:      3. RIGHT FEMORAL GRAFT    Aerobic culture [[id number]]  (Abnormal)  (Susceptibility) Collected: 09/27/22  0944    Order Status: Completed Specimen: Wound from Groin Updated: 09/29/22 1256     Aerobic Bacterial Culture ESCHERICHIA COLI ESBL  Moderate      Narrative:      1. RIGHT GROIN WOUND    Aerobic culture [[id number]]  (Abnormal)  (Susceptibility) Collected: 09/27/22 0944    Order Status: Completed Specimen: Wound from Groin Updated: 09/29/22 1248     Aerobic Bacterial Culture CITROBACTER FARMERI  Rare      Narrative:      2. RIGHT FEMORAL ARTERY    Culture, Anaerobe [[id number]] Collected: 09/27/22 0944    Order Status: Completed Specimen: Wound from Groin Updated: 09/29/22 1059     Anaerobic Culture Culture in progress    Narrative:      4. FEMORAL TISSUE RIGHT GROIN    Culture, Anaerobe [[id number]] Collected: 09/27/22 0944    Order Status: Completed Specimen: Wound from Groin Updated: 09/29/22 1058     Anaerobic Culture Culture in progress    Narrative:      3. RIGHT FEMORAL GRAFT    Culture, Anaerobe [[id number]] Collected: 09/27/22 0944    Order Status: Completed Specimen: Wound from Groin Updated: 09/29/22 1057     Anaerobic Culture Culture in progress    Narrative:      2. RIGHT FEMORAL ARTERY    Culture, Anaerobe [[id number]] Collected: 09/27/22 0944    Order Status: Completed Specimen: Wound from Groin Updated: 09/29/22 1056     Anaerobic Culture Culture in progress    Narrative:      1. RIGHT GROIN WOUND    Culture, Anaerobe [[id number]] Collected: 09/25/22 0815    Order Status: Completed Specimen: Wound from Groin Updated: 09/29/22 0932     Anaerobic Culture No anaerobes isolated    AFB Culture & Smear [[id number]] Collected: 09/27/22 0944    Order Status: Completed Specimen: Wound from Groin Updated: 09/29/22 0927     AFB Culture & Smear Culture in progress     AFB CULTURE STAIN No acid fast bacilli seen.    Narrative:      1. RIGHT GROIN WOUND    AFB Culture & Smear [[id number]] Collected: 09/27/22 0944    Order Status: Completed Specimen: Wound from Groin Updated: 09/28/22 2127     AFB Culture & Smear Culture  in progress     AFB CULTURE STAIN No acid fast bacilli seen.    Narrative:      2. RIGHT FEMORAL ARTERY    AFB Culture & Smear [[id number]] Collected: 09/27/22 0944    Order Status: Completed Specimen: Wound from Groin Updated: 09/28/22 2127     AFB Culture & Smear Culture in progress     AFB CULTURE STAIN No acid fast bacilli seen.    Narrative:      3. RIGHT FEMORAL GRAFT    AFB Culture & Smear [[id number]] Collected: 09/27/22 0944    Order Status: Completed Specimen: Wound from Groin Updated: 09/28/22 2127     AFB Culture & Smear Culture in progress     AFB CULTURE STAIN No acid fast bacilli seen.    Narrative:      4. FEMORAL TISSUE RIGHT GROIN    Gram stain [[id number]] Collected: 09/27/22 0944    Order Status: Completed Specimen: Wound from Groin Updated: 09/27/22 1312     Gram Stain Result Few WBC's      No organisms seen    Narrative:      1. RIGHT GROIN WOUND    Gram stain [[id number]] Collected: 09/27/22 0944    Order Status: Completed Specimen: Wound from Groin Updated: 09/27/22 1204     Gram Stain Result No WBC's      No organisms seen    Narrative:      2. RIGHT FEMORAL ARTERY    Gram stain [[id number]] Collected: 09/27/22 0944    Order Status: Completed Specimen: Wound from Groin Updated: 09/27/22 1203     Gram Stain Result No WBC's      No organisms seen    Narrative:      3. RIGHT FEMORAL GRAFT    Gram stain [[id number]] Collected: 09/27/22 0944    Order Status: Completed Specimen: Wound from Groin Updated: 09/27/22 1202     Gram Stain Result No WBC's      No organisms seen    Narrative:      4. FEMORAL TISSUE RIGHT GROIN    Fungus culture [[id number]] Collected: 09/27/22 0944    Order Status: Sent Specimen: Wound from Groin Updated: 09/27/22 1036    Fungus culture [[id number]] Collected: 09/27/22 0944    Order Status: Sent Specimen: Wound from Groin Updated: 09/27/22 1034    Fungus culture [[id number]] Collected: 09/27/22 0944    Order Status: Sent Specimen: Wound from Groin Updated: 09/27/22 1032    Fungus  culture [[id number]] Collected: 09/27/22 0944    Order Status: Sent Specimen: Wound from Groin Updated: 09/27/22 1029    Aerobic culture [[id number]]  (Abnormal)  (Susceptibility) Collected: 09/25/22 0815    Order Status: Completed Specimen: Wound from Groin Updated: 09/27/22 0945     Aerobic Bacterial Culture ESCHERICHIA COLI ESBL  Few      Fungus culture [[id number]] Collected: 09/25/22 0815    Order Status: Completed Specimen: Wound from Groin Updated: 09/26/22 1150     Fungus (Mycology) Culture Culture in progress    Blood culture [[id number]]     Order Status: Canceled Specimen: Blood     Blood culture [[id number]]     Order Status: Canceled Specimen: Blood             Significant Imaging: I have reviewed all pertinent imaging results/findings within the past 24 hours.

## 2022-09-30 NOTE — RESPIRATORY THERAPY
RAPID RESPONSE RESPIRATORY THERAPY PROACTIVE NOTE           Time of visit: 936     Code Status: Full Code   : 1965  Bed: 315/315 A:   MRN: [id number]  Time spent at the bedside: < 15 min    SITUATION    Evaluated patient for: LDA Check     BACKGROUND    Patient has a past medical history of A-fib, Anticoagulant long-term use, Atrial flutter, CHF (congestive heart failure), Class 1 obesity due to excess calories with serious comorbidity and body mass index (BMI) of 31.0 to 31.9 in adult, Class 1 obesity due to excess calories with serious comorbidity and body mass index (BMI) of 32.0 to 32.9 in adult, Dilated cardiomyopathy, Disorder of kidney and ureter, Encounter for blood transfusion, Essential hypertension, Gout, HTN (hypertension), psychiatric care, ICD (implantable cardioverter-defibrillator) infection, Psychiatric problem, Thyroid disease, and Ventricular tachycardia (paroxysmal).  Clinically Significant Surgical Hx: tracheostomy    24 Hours Vitals Range:  Temp:  [98.2 °F (36.8 °C)-98.9 °F (37.2 °C)]   Pulse:  [60-89]   Resp:  [16-18]   BP: (72-98)/(0-80)   SpO2:  [92 %-97 %]     Labs:    Recent Labs     22  0332 22  0451 22  0546   * 131* 136   K 4.9 4.0 4.0    102 104   CO2 22* 24 25   CREATININE 1.1 1.2 1.2   GLU 80 62* 76   PHOS  --  2.7 2.3*   MG 1.9 2.0 2.0        No results for input(s): PH, PCO2, PO2, HCO3, POCSATURATED, BE in the last 72 hours.    ASSESSMENT/INTERVENTIONS  Pt resting in bed with no need for respiratory intervention at time of visit. Trach is currently capped, secure, and clean. Plans for decannulation prior to discharge next week per surgical team.      Last VS   Temp: 98.9 °F (37.2 °C) (915)  Pulse: 70 (936)  Resp: 17 (936)  BP: 98/80 (915)  SpO2: 97 % (936)      Extra trachs at bedside: 8.0 & 6.0 Shiley Cuffed  Level of Consciousness: Level of Consciousness (AVPU): alert  Respiratory Effort: Respiratory Effort:  Unlabored Expansion/Accessory Muscle Usage: Expansion/Accessory Muscles/Retractions: no use of accessory muscles, no retractions, expansion symmetric  All Lung Field Breath Sounds: All Lung Fields Breath Sounds: Anterior:, Lateral:, clear  SHERWIN Breath Sounds: clear  LLL Breath Sounds: diminished  RUL Breath Sounds: clear  RML Breath Sounds: clear  RLL Breath Sounds: diminished  O2 Device/Concentration: room air  Surgical airway: Yes, Type: Shiley Size: 8, cuffed  Ambu at bedside: Ambu bag with the patient?: Yes, Adult Ambu     Active Orders   Respiratory Care    Pulse Oximetry Q4H     Frequency: Q4H     Number of Occurrences: Until Specified    Routine tracheostomy care     Frequency: BID     Number of Occurrences: Until Specified       RECOMMENDATIONS    We recommend: RRT Recs: Continue POC per primary team.      FOLLOW-UP    Please call back the Rapid Response RT, Esvin Hall, RRT at x [phone] for any questions or concerns.

## 2022-09-30 NOTE — PROGRESS NOTES
[facility] - Cardiology Stepdown  Infectious Disease  Progress Note    Patient Name: Tim Richards  MRN: [id number]  Admission Date: 9/24/2022  Length of Stay: 6 days  Attending Physician: Antonio Hadley Jr.,*  Primary Care Provider: Deyanira Booth MD    Isolation Status: Contact  Assessment/Plan:      * Pseudoaneurysm of right femoral artery  Infected pseudoaneurysm/mycotic aneurysm given purulent drainage noted over prior cannulation site. CTA with new dilatation of R fem artery. Superficial wound cx with ESBL Ecoli. Blcx unrevealing. S/p OR with vascular/plastics for R groin exploration, creation of R ileofem bypass (end-end) with rif soaked dacron graft and creation of muscle flap - OR cx with ESBL Ecoli (R fem graft/groin wound), Ecoli (Fem tissue groin wound), and Citrobacter farmeri (R fem artery). Fungal, anaerobic and AFB cx in process (AFB stain negative).     Recommendations:  -stop dapto, no evidence of staph/GPC, and meropenem (no pseudomonas)  -de-escalate to ertapenem 1g iv daily, anticipate at least 6w course from OR - ina 11/8.   -unfortunately, no oral options available to cover all organisms isolated  -wound vac/CLEO drain per surgical team     Outpatient Antibiotic Therapy Plan:    Please send referral to [facility].    1) Infection: Infected R femoral artery/mycotic aneurysm (ESBL Ecoli/Citrobacter)    2) Discharge Antibiotics:    Intravenous antibiotics:  Ertapenem 1g iv daily    3) Therapy Duration:  At least 6w     Estimated end date of IV antibiotics: 11/8/22    4) Outpatient Weekly Labs:    Order the following labs to be drawn on Mondays:    CBC   CMP       5) Fax Lab Results to Infectious Diseases Provider: Ana    [facility] Fax Number: [phone]    6) Outpatient Infectious Diseases Follow-up     Follow-up appointment will be arranged by the ID clinic and will be found in the patient's appointments tab.     Prior to discharge,  please ensure the patient's follow-up has been scheduled.     If there is still no follow-up scheduled prior to discharge, please send an EPIC message to Brenda Samuels in Infectious Diseases.                    Mycotic aneurysm  See pseudoaneurysm    Groin hematoma  See pseudoaneurysm          Thank you for your consult. I will sign off. Please contact us if you have any additional questions. Above d/w primary team. Please notify ID of any new culture growths.    Time: 35 minutes   50% of time spent on face-to-face counseling and coordination of care. Counseling included review of test results, diagnosis, and treatment plan with patient and/or family.        Jessica Perez MD  Infectious Disease  [facility] - Cardiology Stepdown    Subjective:     Principal Problem:Pseudoaneurysm of right femoral artery    HPI: 57 yo male with complex medical history including HM3 2018 with recent prolonged hospital stay for COVID with prior hospital course notable for AV repair, redo sternotomy, explant of prior LVAD and Va-ECMO (decannulated 7/19) with takeback for mediastinal washout/hematoma, sternal closure, creation of neopericardium, Kleb aerogenes VAP s/p treatment admitted for bleeding from prior ECMO cannula site. Pt reported that he was seen in HTS clinic on 9/22 and was noted to have purulent drainage. He was given a course of doxycycline. Admission notable for CTA with new dilatation of R fem artery in the region of prior soft tissue density in R groin from prior ECMO cannulation site. Surgical disposition pending. Blcx ngtd and wound cultures from R groin in process. Pt is currently on doxycycline.     Interval History: No fevers documented overnight. Pt reports tolerating abx without issues. OR cx with ESBL Ecoli and Citrobacter.         Review of Systems   Constitutional:  Negative for chills and fever.   Gastrointestinal:  Negative for abdominal pain, diarrhea, nausea and vomiting.   Musculoskeletal:  Negative  for myalgias.   All other systems reviewed and are negative.  Objective:     Vital Signs (Most Recent):  Temp: 98.6 °F (37 °C) (09/30/22 0528)  Pulse: 89 (09/30/22 0600)  Resp: 17 (09/30/22 0528)  BP: (!) 98/57 (09/30/22 0528)  SpO2: 96 % (09/30/22 0528)   Vital Signs (24h Range):  Temp:  [98.2 °F (36.8 °C)-98.8 °F (37.1 °C)] 98.6 °F (37 °C)  Pulse:  [60-89] 89  Resp:  [16-18] 17  SpO2:  [92 %-97 %] 96 %  BP: (72-98)/(0-57) 98/57     Weight: 86 kg (189 lb 9.5 oz)  Body mass index is 25.01 kg/m².    Estimated Creatinine Clearance: 78.6 mL/min (based on SCr of 1.2 mg/dL).    Physical Exam  Constitutional:       General: He is not in acute distress.     Appearance: He is not ill-appearing, toxic-appearing or diaphoretic.   HENT:      Head: Normocephalic and atraumatic.      Right Ear: External ear normal.      Left Ear: External ear normal.      Mouth/Throat:      Mouth: Mucous membranes are moist.      Pharynx: No oropharyngeal exudate or posterior oropharyngeal erythema.   Eyes:      General: No scleral icterus.        Right eye: No discharge.         Left eye: No discharge.   Neck:      Comments: Trach capped  Cardiovascular:      Comments: vad  Pulmonary:      Effort: Pulmonary effort is normal. No respiratory distress.      Breath sounds: No stridor. No wheezing or rhonchi.   Abdominal:      General: There is no distension.      Tenderness: There is no abdominal tenderness. There is no guarding.      Comments: LVAD - dressed  Prior LVAD site - dressed, c/d   Musculoskeletal:      Right lower leg: No edema.      Left lower leg: No edema.   Skin:     General: Skin is warm and dry.      Coloration: Skin is not jaundiced.      Findings: No bruising or erythema.      Comments: R groin - wound vac  Rleg surgical site dressed  CLEO drain   Neurological:      Mental Status: He is alert and oriented to person, place, and time. Mental status is at baseline.      Motor: No weakness.   Psychiatric:         Mood and Affect: Mood  normal.         Behavior: Behavior normal.       Significant Labs:   Microbiology Results (last 7 days)       Procedure Component Value Units Date/Time    Blood culture [[id number]] Collected: 09/25/22 0138    Order Status: Completed Specimen: Blood from Peripheral, Hand, Right Updated: 09/30/22 0612     Blood Culture, Routine No growth after 5 days.    Narrative:      Collection has been rescheduled by TR3 at 09/25/2022 00:31 Reason: Pt   dont wanna get stuck rn rachille  Collection has been rescheduled by TR3 at 09/25/2022 00:31 Reason: Pt   dont wanna get stuck rn rachille    Blood culture [[id number]] Collected: 09/25/22 0133    Order Status: Completed Specimen: Blood from Peripheral, Lower Arm, Left Updated: 09/30/22 0612     Blood Culture, Routine No growth after 5 days.    Narrative:      Collection has been rescheduled by TR3 at 09/25/2022 00:31 Reason: Pt   dont wanna get stuck rn rachille  Collection has been rescheduled by TR3 at 09/25/2022 00:31 Reason: Pt   dont wanna get stuck rn rachille    Aerobic culture [[id number]]  (Abnormal)  (Susceptibility) Collected: 09/27/22 0944    Order Status: Completed Specimen: Wound from Groin Updated: 09/29/22 1300     Aerobic Bacterial Culture ESCHERICHIA COLI  Rare      Narrative:      4. FEMORAL TISSUE RIGHT GROIN    Aerobic culture [[id number]]  (Abnormal)  (Susceptibility) Collected: 09/27/22 0944    Order Status: Completed Specimen: Wound from Groin Updated: 09/29/22 1259     Aerobic Bacterial Culture ESCHERICHIA COLI ESBL  Rare      Narrative:      3. RIGHT FEMORAL GRAFT    Aerobic culture [[id number]]  (Abnormal)  (Susceptibility) Collected: 09/27/22 0944    Order Status: Completed Specimen: Wound from Groin Updated: 09/29/22 1256     Aerobic Bacterial Culture ESCHERICHIA COLI ESBL  Moderate      Narrative:      1. RIGHT GROIN WOUND    Aerobic culture [[id number]]  (Abnormal)  (Susceptibility) Collected: 09/27/22 0944    Order Status: Completed Specimen: Wound from  Groin Updated: 09/29/22 1248     Aerobic Bacterial Culture CITROBACTER FARMERI  Rare      Narrative:      2. RIGHT FEMORAL ARTERY    Culture, Anaerobe [[id number]] Collected: 09/27/22 0944    Order Status: Completed Specimen: Wound from Groin Updated: 09/29/22 1059     Anaerobic Culture Culture in progress    Narrative:      4. FEMORAL TISSUE RIGHT GROIN    Culture, Anaerobe [[id number]] Collected: 09/27/22 0944    Order Status: Completed Specimen: Wound from Groin Updated: 09/29/22 1058     Anaerobic Culture Culture in progress    Narrative:      3. RIGHT FEMORAL GRAFT    Culture, Anaerobe [[id number]] Collected: 09/27/22 0944    Order Status: Completed Specimen: Wound from Groin Updated: 09/29/22 1057     Anaerobic Culture Culture in progress    Narrative:      2. RIGHT FEMORAL ARTERY    Culture, Anaerobe [[id number]] Collected: 09/27/22 0944    Order Status: Completed Specimen: Wound from Groin Updated: 09/29/22 1056     Anaerobic Culture Culture in progress    Narrative:      1. RIGHT GROIN WOUND    Culture, Anaerobe [[id number]] Collected: 09/25/22 0815    Order Status: Completed Specimen: Wound from Groin Updated: 09/29/22 0932     Anaerobic Culture No anaerobes isolated    AFB Culture & Smear [[id number]] Collected: 09/27/22 0944    Order Status: Completed Specimen: Wound from Groin Updated: 09/29/22 0927     AFB Culture & Smear Culture in progress     AFB CULTURE STAIN No acid fast bacilli seen.    Narrative:      1. RIGHT GROIN WOUND    AFB Culture & Smear [[id number]] Collected: 09/27/22 0944    Order Status: Completed Specimen: Wound from Groin Updated: 09/28/22 2127     AFB Culture & Smear Culture in progress     AFB CULTURE STAIN No acid fast bacilli seen.    Narrative:      2. RIGHT FEMORAL ARTERY    AFB Culture & Smear [[id number]] Collected: 09/27/22 0944    Order Status: Completed Specimen: Wound from Groin Updated: 09/28/22 2127     AFB Culture & Smear Culture in progress     AFB CULTURE STAIN No acid fast  bacilli seen.    Narrative:      3. RIGHT FEMORAL GRAFT    AFB Culture & Smear [[id number]] Collected: 09/27/22 0944    Order Status: Completed Specimen: Wound from Groin Updated: 09/28/22 2127     AFB Culture & Smear Culture in progress     AFB CULTURE STAIN No acid fast bacilli seen.    Narrative:      4. FEMORAL TISSUE RIGHT GROIN    Gram stain [[id number]] Collected: 09/27/22 0944    Order Status: Completed Specimen: Wound from Groin Updated: 09/27/22 1312     Gram Stain Result Few WBC's      No organisms seen    Narrative:      1. RIGHT GROIN WOUND    Gram stain [[id number]] Collected: 09/27/22 0944    Order Status: Completed Specimen: Wound from Groin Updated: 09/27/22 1204     Gram Stain Result No WBC's      No organisms seen    Narrative:      2. RIGHT FEMORAL ARTERY    Gram stain [[id number]] Collected: 09/27/22 0944    Order Status: Completed Specimen: Wound from Groin Updated: 09/27/22 1203     Gram Stain Result No WBC's      No organisms seen    Narrative:      3. RIGHT FEMORAL GRAFT    Gram stain [[id number]] Collected: 09/27/22 0944    Order Status: Completed Specimen: Wound from Groin Updated: 09/27/22 1202     Gram Stain Result No WBC's      No organisms seen    Narrative:      4. FEMORAL TISSUE RIGHT GROIN    Fungus culture [[id number]] Collected: 09/27/22 0944    Order Status: Sent Specimen: Wound from Groin Updated: 09/27/22 1036    Fungus culture [[id number]] Collected: 09/27/22 0944    Order Status: Sent Specimen: Wound from Groin Updated: 09/27/22 1034    Fungus culture [[id number]] Collected: 09/27/22 0944    Order Status: Sent Specimen: Wound from Groin Updated: 09/27/22 1032    Fungus culture [[id number]] Collected: 09/27/22 0944    Order Status: Sent Specimen: Wound from Groin Updated: 09/27/22 1029    Aerobic culture [[id number]]  (Abnormal)  (Susceptibility) Collected: 09/25/22 0815    Order Status: Completed Specimen: Wound from Groin Updated: 09/27/22 0945     Aerobic Bacterial Culture  ESCHERICHIA COLI ESBL  Few      Fungus culture [[id number]] Collected: 09/25/22 0815    Order Status: Completed Specimen: Wound from Groin Updated: 09/26/22 1150     Fungus (Mycology) Culture Culture in progress    Blood culture [[id number]]     Order Status: Canceled Specimen: Blood     Blood culture [[id number]]     Order Status: Canceled Specimen: Blood             Significant Imaging: I have reviewed all pertinent imaging results/findings within the past 24 hours.

## 2022-09-30 NOTE — PT/OT/SLP EVAL
"Occupational Therapy   Co-Evaluation    Name: Tim Richards  MRN: [id number]  Admitting Diagnosis:  Pseudoaneurysm of right femoral artery  Recent Surgery: Procedure(s) (LRB):  CREATION, BYPASS, ARTERIAL, ILIAC TO FEMORAL WITH GRAFT (Right)  CREATION, FLAP, MUSCLE ROTATION, SARTORIUS AND RECTUS FEMORIS (Right)  APPLICATION, WOUND VAC (Right) 3 Days Post-Op  Pt had LVAD placed July 2022 and was t/f to REHAB and then home x 3 days before returning for above procedure.   Recommendations:     Discharge Recommendations: home with home health      Assessment:     Tim Richards is a 56 y.o. male with a medical diagnosis of Pseudoaneurysm of right femoral artery.  Performance deficits affecting function: weakness, impaired endurance, impaired self care skills, impaired functional mobility, gait instability, impaired balance, pain.  Pt tolerated session well with good effort and performance. Anticipate pt will progress well and will be ready for d/c home with family support when medically stable.     Rehab Prognosis: Good; patient would benefit from acute skilled OT services to address these deficits and reach maximum level of function.       Plan:     Patient to be seen 4 x/week to address the above listed problems via self-care/home management, therapeutic activities, therapeutic exercises  Plan of Care Expires:    Plan of Care Reviewed with: patient    Subjective     Pt agreeable to therapy.   Pt states, "I like to be independent"      Occupational Profile:  Pt resides in 2 story home with spouse and mother.   Pt reports he was modified independent with RW for the 3 days he was home between IP REHAB and readmission. Pt reports he is independent with LVAD mildred't and ADL skills.     Pain/Comfort:  Pain Rating 1: 5/10  Location - Side 1: Right  Location 1: groin  Pain Addressed 1: Reposition, Distraction    Patients cultural, spiritual, Yarsanism conflicts given the current situation: no    Objective: " "    Communicated with: nsg prior to session.  Patient found in bed with tele and LVAD to wall power.  Coeval completed this date to optimize functional performance and safety given impaired tolerance for activity     General Precautions: Standard,   fall, LVAD    Occupational Performance:    Bed Mobility:    Supine>sit with SBA  SIt>supine with MIN A for right LE mildred't.     Functional Mobility/Transfers:  Sit>Stand with MIN A from bed and standard commode.  Cues needed for hand placement and R LE placement to assist with pain mildred't with stand>sit transition.     Activities of Daily Living:  Feeding: independent  LE dressing: TOTAL A. Pt denies attempt for LE dressing stating he did not want to put pressure on right groin since that is what he was doing when "the artery popped".   TOileting: SBA standard commode.     Cognitive/Visual Perceptual  Pt awake, alert and following commands.     Physical Exam:  Pt is right hand dominant and demo WFL UE strength/ROM, coordination and sensation     AMPAC 6 Click ADL:  AMPAC Total Score: 17    Treatment & Education:  Pt completed functional mobility in room with RW and CGA.   Education provided re: importance of OOB activity including t/f to chair. Chair surface adapted to allow for elevated surface height, but pt declined t/f to chair.   Education provided re: OT POC and safety with functional mobility/ADL skills.       Patient left HOB elevated with all lines intact, call button in reach, and nsg notified    GOALS:   Multidisciplinary Problems       Occupational Therapy Goals          Problem: Occupational Therapy    Goal Priority Disciplines Outcome Interventions   Occupational Therapy Goal     OT, PT/OT Ongoing, Progressing    Description: Goals to be met by: 14 days 10/14/22     Patient will increase functional independence with ADLs by performing:    Pt to complete UE dressing with set-up  Pt to complete LE dressing with MIN A with AE as needed  Pt to complete " toileting with SBA  Pt to complete t/f commode with SBA  Pt to complete standing g/h skills with supervision.                        History:     Past Medical History:   Diagnosis Date    A-fib     Anticoagulant long-term use     Atrial flutter 7/30/2022    CHF (congestive heart failure)     Class 1 obesity due to excess calories with serious comorbidity and body mass index (BMI) of 31.0 to 31.9 in adult     Class 1 obesity due to excess calories with serious comorbidity and body mass index (BMI) of 32.0 to 32.9 in adult     Dilated cardiomyopathy 1/10/2018    Disorder of kidney and ureter     CKD    Encounter for blood transfusion     Essential hypertension 8/28/2022    Gout     HTN (hypertension)     Hx of psychiatric care     ICD (implantable cardioverter-defibrillator) infection 7/1/2020    Psychiatric problem     Thyroid disease     Ventricular tachycardia (paroxysmal)          Past Surgical History:   Procedure Laterality Date    AORTIC VALVULOPLASTY N/A 7/13/2022    Procedure: REPAIR, AORTIC VALVE;  Surgeon: Yg Kaufman MD;  Location: [facility] OR [address] [place] [address];  Service: Cardiovascular;  Laterality: N/A;    APPLICATION OF WOUND VACUUM-ASSISTED CLOSURE DEVICE N/A 7/15/2022    Procedure: APPLICATION, WOUND VAC;  Surgeon: Yg Kaufman MD;  [facility];  Service: Cardiovascular;  Laterality: N/A;  50 x 5 cm     APPLICATION OF WOUND VACUUM-ASSISTED CLOSURE DEVICE Right 9/27/2022    Procedure: APPLICATION, WOUND VAC;  Surgeon: Kole Tabares MD;  Location: [facility] OR [address] [place] [address];  Service: Plastics;  Laterality: Right;    CARDIAC CATHETERIZATION  Dec. 2012    CARDIAC DEFIBRILLATOR PLACEMENT Left     CRRT-D    COLONOSCOPY N/A 3/6/2018    Procedure: COLONOSCOPY;  Surgeon: Alonso Bone MD;  [facility]  Service: Endoscopy;  Laterality: N/A;    COLONOSCOPY N/A 7/17/2019    Procedure: COLONOSCOPY;  Surgeon: Blane Valdez MD;  [facility]  Service: Endoscopy;  Laterality:  N/A;    COLONOSCOPY N/A 7/18/2019    Procedure: COLONOSCOPY;  Surgeon: Blane Valdez MD;  Location: [facility] ENDO (2ND FLR);  Service: Endoscopy;  Laterality: N/A;    CREATION OF ILIOFEMORAL ARTERY BYPASS Right 9/27/2022    Procedure: CREATION, BYPASS, ARTERIAL, ILIAC TO FEMORAL WITH GRAFT;  Surgeon: Zach Hernández MD;  [facility];  Service: Peripheral Vascular;  Laterality: Right;    CREATION OF MUSCLE ROTATIONAL FLAP Right 9/27/2022    Procedure: CREATION, FLAP, MUSCLE ROTATION, SARTORIUS AND RECTUS FEMORIS;  Surgeon: Kole Tabares MD;  Location: [facility] OR [facility] F[facility];  Service: Plastics;  Laterality: Right;    ESOPHAGOGASTRODUODENOSCOPY N/A 7/17/2019    Procedure: EGD (ESOPHAGOGASTRODUODENOSCOPY);  Surgeon: Blane Valdez MD;  Location: [facility] ENDO (2ND FLR);  Service: Endoscopy;  Laterality: N/A;    ESOPHAGOGASTRODUODENOSCOPY N/A 7/18/2019    Procedure: EGD (ESOPHAGOGASTRODUODENOSCOPY);  Surgeon: Blane Valdez MD;  Location: [facility] ENDO (2ND FLR);  Service: Endoscopy;  Laterality: N/A;    FOREIGN BODY REMOVAL N/A 7/22/2022    Procedure: REMOVAL, FOREIGN BODY;  Surgeon: Yg Kaufman MD;  [facility];  Service: Cardiovascular;  Laterality: N/A;  LVAD Heartmate 2 drive line removal    INSERTION OF GRAFT TO PERICARDIUM N/A 7/15/2022    Procedure: INSERTION, GRAFT, PERICARDIUM;  Surgeon: Yg Kaufman MD;  Location: [facility] OR [facility] FLR;  Service: Cardiovascular;  Laterality: N/A;    IRRIGATION OF MEDIASTINUM N/A 7/15/2022    Procedure: IRRIGATION, MEDIASTINUM;  Surgeon: Yg Kaufman MD;  Location: [facility] OR [facility] FLR;  Service: Cardiovascular;  Laterality: N/A;    LYSIS OF ADHESIONS  7/13/2022    Procedure: LYSIS, ADHESIONS;  Surgeon: Yg Kaufman MD;  Location: [facility] OR [facility] FLR;  Service: Cardiovascular;;    NONINVASIVE CARDIAC ELECTROPHYSIOLOGY STUDY N/A 10/18/2019    Procedure: CARDIAC ELECTROPHYSIOLOGY STUDY, NONINVASIVE;  Surgeon: Raz Wagner MD;  Location: [facility] LAB;  Service:  Cardiology;  Laterality: N/A;  VT, DFTs, MDT CRTD in situ, LVAD, juarez, MB, 3098    REPLACEMENT OF IMPLANTABLE CARDIOVERTER-DEFIBRILLATOR (ICD) GENERATOR N/A 3/9/2020    Procedure: REPLACEMENT, ICD GENERATOR;  Surgeon: Harry Yun MD;  Location: [facility] EP LAB;  Service: Cardiology;  Laterality: N/A;  VT, ICD Gen Change and Lead Revision, MDT, MAC, DM,3 Prep    REPLACEMENT OF LEFT VENTRICULAR ASSIST DEVICE (LVAD)  7/13/2022    Procedure: REPLACEMENT, LVAD;  Surgeon: Yg Kaufman MD;  [facility];  Service: Cardiovascular;;    REPLACEMENT OF PUMP N/A 7/13/2022    Procedure: REPLACEMENT, PUMP;  Surgeon: Yg Kaufman MD;  [facility];  Service: Cardiovascular;  Laterality: N/A;  LVAD pump exchange  EXPLANATION OF HEATMATE 2  IMPLANTATION OF HEARTMATE 3  IMPLANTATION OF 8MM CHIMNEY GRAFT TO RFA  INITIATION OF ECMO  TEMPORARY CLOSURE OF CHEST    REVISION OF IMPLANTABLE CARDIOVERTER-DEFIBRILLATOR (ICD) ELECTRODE LEAD PLACEMENT N/A 3/9/2020    Procedure: REVISION, INSERTION, ELECTRODE LEAD, ICD;  Surgeon: Harry Yun MD;  Location: [facility] EP LAB;  Service: Cardiology;  Laterality: N/A;  VT, ICD Gen Change and Lead Revision, MDT, MAC, DM,3 Prep    STERNAL WOUND CLOSURE N/A 7/14/2022    Procedure: CLOSURE, WOUND, STERNUM;  Surgeon: Yg Kaufman MD;  [facility];  Service: Cardiovascular;  Laterality: N/A;  temporary closure  evacuation of hematoma    STERNAL WOUND CLOSURE N/A 7/15/2022    Procedure: CLOSURE, WOUND, STERNUM;  Surgeon: Yg Kaufman MD;  [facility];  Service: Cardiovascular;  Laterality: N/A;    TRACHEOSTOMY N/A 8/4/2022    Procedure: CREATION, TRACHEOSTOMY;  Surgeon: Germain Holt MD;  [facility];  Service: General;  Laterality: N/A;    TREATMENT OF CARDIAC ARRHYTHMIA  10/18/2019    Procedure: Cardioversion or Defibrillation;  Surgeon: Raz Wagner MD;  Location: [facility] EP LAB;  Service: Cardiology;;       Time  Tracking:     OT Date of Treatment: 09/30/22  OT Start Time: 0950  OT Stop Time: 1022  OT Total Time (min): 32 min    Billable Minutes:Evaluation 8  Self Care/Home Management 12  Therapeutic Activity 12    9/30/2022

## 2022-09-30 NOTE — PROGRESS NOTES
[facility].4x4x0.2[facility] - Cardiology Stepdown  Wound Care    Patient Name:  Tim Richards   MRN:  [id number]  Date: 2022  Diagnosis: Pseudoaneurysm of right femoral artery    History:     Past Medical History:   Diagnosis Date    A-fib     Anticoagulant long-term use     Atrial flutter 2022    CHF (congestive heart failure)     Class 1 obesity due to excess calories with serious comorbidity and body mass index (BMI) of 31.0 to 31.9 in adult     Class 1 obesity due to excess calories with serious comorbidity and body mass index (BMI) of 32.0 to 32.9 in adult     Dilated cardiomyopathy 1/10/2018    Disorder of kidney and ureter     CKD    Encounter for blood transfusion     Essential hypertension 2022    Gout     HTN (hypertension)     Hx of psychiatric care     ICD (implantable cardioverter-defibrillator) infection 2020    Psychiatric problem     Thyroid disease     Ventricular tachycardia (paroxysmal)        Social History     Socioeconomic History    Marital status:     Number of children: 3   Occupational History     Employer: [employer]    Tobacco Use    Smoking status: Former     Packs/day: 1.00     Years: 31.00     Pack years: 31.00     Types: Cigarettes     Quit date: 2018     Years since quittin.7    Smokeless tobacco: Never    Tobacco comments:     pt is quiting on his own - pt stated not qualified for program;  pt  quit on his own   Substance and Sexual Activity    Alcohol use: No     Alcohol/week: 0.0 standard drinks     Comment: quit    Drug use: No    Sexual activity: Yes     Partners: Female     Birth control/protection: None     Comment: 10/5/17  with same partner 7 years    Social History Narrative    Disabled       Precautions:     Allergies as of 2022 - Reviewed 2022   Allergen Reaction Noted    Lisinopril Anaphylaxis 2017    Hydralazine analogues  2020       WOC Assessment Details/Treatment    Consult received. Changed WV  and measured. Wound is granulating and filling in. It is filled nearly to the top. Scant drainage with red and pink tissue.      09/30/22 1030   WOCN Assessment   WOCN Total Time (mins) 30   Visit Date 09/30/22   Visit Time 1030   Consult Type New   WOCN Speciality Wound   Intervention assessed;changed;applied;chart review;orders;coordination of care   Teaching on-going        Negative Pressure Wound Therapy  09/27/22 1132 Right anterior   Placement Date/Time: 09/27/22 1132   Side: Right  Orientation: anterior  Location: Groin  Additional Comments: PLACED BY MAYE MCHUGH, RESIDENT FOR PLASTICS; OR 10 ILEO-FEMORAL BYPASS GRAFT WITH MUSCLE FLAP   NPWT Type Vacuum Therapy   Therapy Setting NPWT Continuous therapy   Sponges Removed NPWT Black;1       09/30/2022

## 2022-09-30 NOTE — ASSESSMENT & PLAN NOTE
Infected pseudoaneurysm/mycotic aneurysm given purulent drainage noted over prior cannulation site. CTA with new dilatation of R fem artery. Superficial wound cx with ESBL Ecoli. Blcx unrevealing. S/p OR with vascular/plastics for R groin exploration, creation of R ileofem bypass (end-end) with rif soaked dacron graft and creation of muscle flap - OR cx with ESBL Ecoli (R fem graft/groin wound), Ecoli (Fem tissue groin wound), and Citrobacter farmeri (R fem artery). Fungal, anaerobic and AFB cx in process (AFB stain negative).     Recommendations:  -stop dapto, no evidence of staph/GPC, and meropenem (no pseudomonas)  -de-escalate to ertapenem 1g iv daily, anticipate at least 6w course from OR - ina 11/8.   -unfortunately, no oral options available to cover all organisms isolated  -wound vac/CLEO drain per surgical team     Outpatient Antibiotic Therapy Plan:    Please send referral to [facility].    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     Prior to discharge, please ensure the patient's follow-up has been scheduled.     If there is still no follow-up scheduled prior to discharge, please send an EPIC message to Brenda Samuels in Infectious Diseases.

## 2022-09-30 NOTE — ASSESSMENT & PLAN NOTE
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 Goal INR 2.0-3.0.  Subtherapeutic today.  Previous home dose was 5mg Qdaily   -LDH is stable    Procedure: Device Interrogation Including analysis of device parameters  Current Settings: Ventricular Assist Device  Review of device function is stable    TXP LVAD INTERROGATIONS 9/30/2022 9/30/2022 9/30/2022 9/30/2022 9/29/2022 9/29/2022 9/29/2022   Type HeartMate3 HeartMate3 HeartMate3 HeartMate3 HeartMate3 HeartMate3 HeartMate3   Flow 5.4 5.5 5.5 5.4 5.6 5.6 5.6   Speed 6400 6400 6400 6400 6400 6400 6400   PI 1.8 2.0 1.5 2.1 1.8 1.6 1.9   Power (Jackson) 5.5 5.7 5.5 5.6 5.5 5.6 5.5   LSL 6000 6000 6000 6000 6000 6000 6000   Low Flow Alarm - - - - - - -   High Power Alarm - - - - - - -   Pulsatility Intermittent pulse Intermittent pulse Intermittent pulse Intermittent pulse - - -

## 2022-09-30 NOTE — NURSING
Spoke with patient, wife on phone. Pt is still having pain to right leg, feels that he will be ready to re-start PT/OT on tomorrow. Wound vac is in place and working properly. Pt is on heparin gtt and IV antibx. Starting coumadin tonight and will watch for bleeding. Trach to be discontinued by Dr. Holt closer to discharge next week.

## 2022-09-30 NOTE — PROGRESS NOTES
[facility] - Cardiology Stepdown  Heart Transplant  Progress Note    Patient Name: Tim Richards  MRN: [id number]  Admission Date: 9/24/2022  Hospital Length of Stay: 6 days  Attending Physician: Antonio Hadley Jr.,*  Primary Care Provider: Deyanira Booth MD  Principal Problem:Pseudoaneurysm of right femoral artery    Subjective:     **Interval History: Post op day 3. Doing well without any evidence of bleeding. Pain under control. Hasn't had a BM postoperatively and reports little gas.     Continuous Infusions:   heparin (porcine) in D5W 12 Units/kg/hr (09/30/22 0709)     Scheduled Meds:   acetaminophen  1,000 mg Oral TID    amiodarone  400 mg Oral Daily    ertapenem (INVANZ) IVPB  1 g Intravenous Q24H    lactulose  15 g Oral TID    levothyroxine  50 mcg Oral Before breakfast    magnesium oxide  400 mg Oral Daily with lunch    mexiletine  250 mg Oral Q8H    mirtazapine  30 mg Oral QHS    pantoprazole  40 mg Oral Daily with lunch    polyethylene glycol  17 g Oral Daily    sodium chloride 0.9%  10 mL Intravenous Q6H    [START ON 10/1/2022] warfarin  5 mg Oral Daily    warfarin  7.5 mg Oral Once     PRN Meds:sodium chloride, sodium chloride, sodium chloride 0.9%, sodium chloride 0.9%, ALPRAZolam, oxyCODONE, oxyCODONE, Flushing PICC Protocol **AND** sodium chloride 0.9% **AND** sodium chloride 0.9%, zolpidem    Review of patient's allergies indicates:   Allergen Reactions    Lisinopril Anaphylaxis    Hydralazine analogues      Chronic constipation, impotence, dizziness     Objective:     Vital Signs (Most Recent):  Temp: 98.9 °F (37.2 °C) (09/30/22 0915)  Pulse: 70 (09/30/22 0936)  Resp: 17 (09/30/22 0936)  BP: 98/80 (09/30/22 0915)  SpO2: 97 % (09/30/22 0936)   Vital Signs (24h Range):  Temp:  [98.2 °F (36.8 °C)-98.9 °F (37.2 °C)] 98.9 °F (37.2 °C)  Pulse:  [60-89] 70  Resp:  [16-18] 17  SpO2:  [92 %-97 %] 97 %  BP: (72-98)/(0-80) 98/80     Patient Vitals for the past 72 hrs (Last 3  readings):   Weight   09/30/22 0528 86 kg (189 lb 9.5 oz)   09/29/22 0529 82.2 kg (181 lb 3.5 oz)   09/28/22 0301 90 kg (198 lb 6.6 oz)       Body mass index is 25.01 kg/m².      Intake/Output Summary (Last 24 hours) at 9/30/2022 1054  Last data filed at 9/30/2022 0528  Gross per 24 hour   Intake 600 ml   Output 910 ml   Net -310 ml         Hemodynamic Parameters:       Telemetry: SR    Physical Exam  Constitutional:       Appearance: Normal appearance.   HENT:      Head: Normocephalic and atraumatic.   Eyes:      Extraocular Movements: Extraocular movements intact.      Pupils: Pupils are equal, round, and reactive to light.   Neck:      Comments: Neck veins are not elevated  Cardiovascular:      Rate and Rhythm: Normal rate and regular rhythm.      Comments: Smooth VAD hum  Pulmonary:      Effort: Pulmonary effort is normal.      Breath sounds: Normal breath sounds.   Abdominal:      General: Bowel sounds are normal. There is no distension.      Palpations: Abdomen is soft.      Tenderness: There is no abdominal tenderness.      Comments: DLES dressing c/d/i   Musculoskeletal:         General: No swelling. Normal range of motion.      Cervical back: Normal range of motion and neck supple.      Right lower leg: No edema.      Left lower leg: No edema.      Comments: R groin with wound vac and CLEO drain. No drainage or bleeding.    Skin:     General: Skin is warm and dry.      Capillary Refill: Capillary refill takes 2 to 3 seconds.   Neurological:      General: No focal deficit present.      Mental Status: He is alert and oriented to person, place, and time.   Psychiatric:         Mood and Affect: Mood normal.         Behavior: Behavior normal.         Thought Content: Thought content normal.         Judgment: Judgment normal.       Significant Labs:  CBC:  Recent Labs   Lab 09/29/22  0744 09/29/22  1448 09/30/22  0546   WBC 13.52* 11.87 10.48   RBC 3.57* 3.19* 3.19*   HGB 9.8* 8.8* 8.7*   HCT 33.4* 29.1* 29.4*     219 232   MCV 94 91 92   MCH 27.5 27.6 27.3   MCHC 29.3* 30.2* 29.6*       BNP:  Recent Labs   Lab 09/24/22  1803 09/26/22  0353 09/28/22  0332   * 97 787*       CMP:  Recent Labs   Lab 09/28/22  0332 09/29/22  0451 09/30/22  0546   GLU 80 62* 76   CALCIUM 8.6* 9.0 8.8   ALBUMIN 2.2* 2.2* 2.0*   PROT 5.5* 6.0 5.8*   * 131* 136   K 4.9 4.0 4.0   CO2 22* 24 25    102 104   BUN 14 14 13   CREATININE 1.1 1.2 1.2   ALKPHOS 75 87 85   ALT 15 17 17   AST 31 27 24   BILITOT 0.5 0.6 0.5        Coagulation:   Recent Labs   Lab 09/28/22  0332 09/29/22  0451 09/29/22  0740 09/29/22  1448 09/29/22 2129 09/30/22  0546   INR 1.2 1.1  --   --   --  1.1   APTT  --  32.4*   < > 39.9* 39.0* 40.7*    < > = values in this interval not displayed.       LDH:  Recent Labs   Lab 09/28/22 0332 09/29/22  0451 09/30/22  0546   * 308* 273*       Microbiology:  Microbiology Results (last 7 days)       Procedure Component Value Units Date/Time    Blood culture [[id number]] Collected: 09/25/22 0138    Order Status: Completed Specimen: Blood from Peripheral, Hand, Right Updated: 09/30/22 0612     Blood Culture, Routine No growth after 5 days.    Narrative:      Collection has been rescheduled by TR3 at 09/25/2022 00:31 Reason: Pt   dont wanna get stuck beni tabor  Collection has been rescheduled by TR3 at 09/25/2022 00:31 Reason: Pt   dont wanna get stuck beni tabor    Blood culture [[id number]] Collected: 09/25/22 0133    Order Status: Completed Specimen: Blood from Peripheral, Lower Arm, Left Updated: 09/30/22 0612     Blood Culture, Routine No growth after 5 days.    Narrative:      Collection has been rescheduled by TR3 at 09/25/2022 00:31 Reason: Pt   dont randell get stuck beni tabor  Collection has been rescheduled by TR3 at 09/25/2022 00:31 Reason: Pt   dont wanna get stuck beni tabor    Aerobic culture [[id number]]  (Abnormal)  (Susceptibility) Collected: 09/27/22 0944    Order Status: Completed  Specimen: Wound from Groin Updated: 09/29/22 1300     Aerobic Bacterial Culture ESCHERICHIA COLI  Rare      Narrative:      4. FEMORAL TISSUE RIGHT GROIN    Aerobic culture [[id number]]  (Abnormal)  (Susceptibility) Collected: 09/27/22 0944    Order Status: Completed Specimen: Wound from Groin Updated: 09/29/22 1259     Aerobic Bacterial Culture ESCHERICHIA COLI ESBL  Rare      Narrative:      3. RIGHT FEMORAL GRAFT    Aerobic culture [[id number]]  (Abnormal)  (Susceptibility) Collected: 09/27/22 0944    Order Status: Completed Specimen: Wound from Groin Updated: 09/29/22 1256     Aerobic Bacterial Culture ESCHERICHIA COLI ESBL  Moderate      Narrative:      1. RIGHT GROIN WOUND    Aerobic culture [[id number]]  (Abnormal)  (Susceptibility) Collected: 09/27/22 0944    Order Status: Completed Specimen: Wound from Groin Updated: 09/29/22 1248     Aerobic Bacterial Culture CITROBACTER FARMERI  Rare      Narrative:      2. RIGHT FEMORAL ARTERY    Culture, Anaerobe [[id number]] Collected: 09/27/22 0944    Order Status: Completed Specimen: Wound from Groin Updated: 09/29/22 1059     Anaerobic Culture Culture in progress    Narrative:      4. FEMORAL TISSUE RIGHT GROIN    Culture, Anaerobe [[id number]] Collected: 09/27/22 0944    Order Status: Completed Specimen: Wound from Groin Updated: 09/29/22 1058     Anaerobic Culture Culture in progress    Narrative:      3. RIGHT FEMORAL GRAFT    Culture, Anaerobe [[id number]] Collected: 09/27/22 0944    Order Status: Completed Specimen: Wound from Groin Updated: 09/29/22 1057     Anaerobic Culture Culture in progress    Narrative:      2. RIGHT FEMORAL ARTERY    Culture, Anaerobe [[id number]] Collected: 09/27/22 0944    Order Status: Completed Specimen: Wound from Groin Updated: 09/29/22 1056     Anaerobic Culture Culture in progress    Narrative:      1. RIGHT GROIN WOUND    Culture, Anaerobe [[id number]] Collected: 09/25/22 0815    Order Status: Completed Specimen: Wound from Groin  Updated: 09/29/22 0932     Anaerobic Culture No anaerobes isolated    AFB Culture & Smear [[id number]] Collected: 09/27/22 0944    Order Status: Completed Specimen: Wound from Groin Updated: 09/29/22 0927     AFB Culture & Smear Culture in progress     AFB CULTURE STAIN No acid fast bacilli seen.    Narrative:      1. RIGHT GROIN WOUND    AFB Culture & Smear [[id number]] Collected: 09/27/22 0944    Order Status: Completed Specimen: Wound from Groin Updated: 09/28/22 2127     AFB Culture & Smear Culture in progress     AFB CULTURE STAIN No acid fast bacilli seen.    Narrative:      2. RIGHT FEMORAL ARTERY    AFB Culture & Smear [[id number]] Collected: 09/27/22 0944    Order Status: Completed Specimen: Wound from Groin Updated: 09/28/22 2127     AFB Culture & Smear Culture in progress     AFB CULTURE STAIN No acid fast bacilli seen.    Narrative:      3. RIGHT FEMORAL GRAFT    AFB Culture & Smear [[id number]] Collected: 09/27/22 0944    Order Status: Completed Specimen: Wound from Groin Updated: 09/28/22 2127     AFB Culture & Smear Culture in progress     AFB CULTURE STAIN No acid fast bacilli seen.    Narrative:      4. FEMORAL TISSUE RIGHT GROIN    Gram stain [[id number]] Collected: 09/27/22 0944    Order Status: Completed Specimen: Wound from Groin Updated: 09/27/22 1312     Gram Stain Result Few WBC's      No organisms seen    Narrative:      1. RIGHT GROIN WOUND    Gram stain [[id number]] Collected: 09/27/22 0944    Order Status: Completed Specimen: Wound from Groin Updated: 09/27/22 1204     Gram Stain Result No WBC's      No organisms seen    Narrative:      2. RIGHT FEMORAL ARTERY    Gram stain [[id number]] Collected: 09/27/22 0944    Order Status: Completed Specimen: Wound from Groin Updated: 09/27/22 1203     Gram Stain Result No WBC's      No organisms seen    Narrative:      3. RIGHT FEMORAL GRAFT    Gram stain [[id number]] Collected: 09/27/22 0944    Order Status: Completed Specimen: Wound from Groin Updated:  09/27/22 1202     Gram Stain Result No WBC's      No organisms seen    Narrative:      4. FEMORAL TISSUE RIGHT GROIN    Fungus culture [[id number]] Collected: 09/27/22 0944    Order Status: Sent Specimen: Wound from Groin Updated: 09/27/22 1036    Fungus culture [[id number]] Collected: 09/27/22 0944    Order Status: Sent Specimen: Wound from Groin Updated: 09/27/22 1034    Fungus culture [[id number]] Collected: 09/27/22 0944    Order Status: Sent Specimen: Wound from Groin Updated: 09/27/22 1032    Fungus culture [[id number]] Collected: 09/27/22 0944    Order Status: Sent Specimen: Wound from Groin Updated: 09/27/22 1029    Aerobic culture [[id number]]  (Abnormal)  (Susceptibility) Collected: 09/25/22 0815    Order Status: Completed Specimen: Wound from Groin Updated: 09/27/22 0945     Aerobic Bacterial Culture ESCHERICHIA COLI ESBL  Few      Fungus culture [[id number]] Collected: 09/25/22 0815    Order Status: Completed Specimen: Wound from Groin Updated: 09/26/22 1150     Fungus (Mycology) Culture Culture in progress    Blood culture [[id number]]     Order Status: Canceled Specimen: Blood     Blood culture [[id number]]     Order Status: Canceled Specimen: Blood             I have reviewed all pertinent labs within the past 24 hours.    Estimated Creatinine Clearance: 78.6 mL/min (based on SCr of 1.2 mg/dL).    Diagnostic Results:  I have reviewed and interpreted all pertinent imaging results/findings within the past 24 hours.    Assessment and Plan:     Tim Richards is a 56 y.o.  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  His post operative course was complicated by cardiogenic shock/RV failure requiring VA-ECMO.  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  He has a 8 Fr cuffed Shiley trach.  He had a  prolonged and complicated hospital course from 6/24/22 - 9/4/22, was discharged to rehab (see clinic visit from 9/22/22 for additional details).  Of note he was last seen in clinic by Dr. Ragsdale on 9/22/22 after being discharged from rehab and at the time was noted to have purulence from the groin site and was placed on doxycycline PO.  He has had complaints of fatigue since his last hospitalization, per his wife.  This morning he was sitting and was crossing his legs for a prolonged period of time, and when he uncrossed them he noticed a pool of blood under him and active bleeding from his R groin where his previous ECMO cannula was.  He began to feel lightheaded when he tried walking.  Per wife he had 2 low flow alarms at home.      In the ED, patient stated he felt at his baseline and had no complaints.  No active bleeding at the time of my evaluation from R groin site.  Doppler BP was 80/0.  Hgb dropped from 9.4 -> 8.9 over the past 2 days.  INR was 3.0.  CT Angio performed per CV surgery recommendations.  HTS was consulted for admission, CT surgery was consulted in the ED for evaluation of groin hematoma.       Cardiovascular Studies  TTE 8/30/22   There is an LVAD present. Base speed is 6300 RPMs. The pump type is a Heartmate III. The interventricular septum appears midline. The aortic valve does not open.   The left ventricle is severely enlarged with eccentric hypertrophy and severely decreased systolic function.   The estimated ejection fraction is 10%.   There is left ventricular global hypokinesis.   Left ventricular diastolic dysfunction.   There is trivial continuous aortic regurgitation.   There is abnormal septal wall motion.   The right ventricle is poorly seen, but appears enlarged with reduced systolic function.   Mild tricuspid regurgitation.      * Pseudoaneurysm of right femoral artery  Vascular surgery consulted, appreciate recommendations  CTA shows 3 new pseudoaneurysms of R  femoral artery  S/p  Right Ileofemoral bypass (end to end). Distal External Iliac to distal femoral artery    Groin hematoma  -Was on VA-ECMO at last hospitalization with successful decannulation  -CTA revealed 3 possible new pseudoaneurysms  -INR 3 on admission, since reversed with PO vit K and FFP.  Holding A/C until further instruction from surgical team regarding resumption of A/C.   -Hgb was trending down since admit. Transfusing to keep Hgb > 10  -Cultures from right groin wound with ESBL E Coli, per ID hold dapto and continue on ertapenem for total of 6 weeks.     VT (ventricular tachycardia)  -Patient has [employer] SICD  -Currently on amiodarone 400mg qd and mexiletine 250mg TID, will continue     LVAD (left ventricular assist device) present  -S/P S/P DT HM2 3/8/2018 then S/P DT HM3 exchange 7/13/2022 2/2 to thrombosis of HM2 thought caused by COVID PNA  -Current speed 6300  -Last TTE done 8/30/2022 at 6300: LVEDD 7.44, LVEF 10%, RV appears enlarged and decreased, trace AI, trace MR, mild TR, PAS > 28, IVC 3, AV does not open, septum midline   Resuming A/C today with coumadin and minimally intense heparin gtt.   -LDH is stable    Procedure: Device Interrogation Including analysis of device parameters  Current Settings: Ventricular Assist Device  Review of device function is stable    TXP LVAD INTERROGATIONS 9/30/2022 9/30/2022 9/30/2022 9/29/2022 9/29/2022 9/29/2022 9/29/2022   Type HeartMate3 HeartMate3 HeartMate3 HeartMate3 HeartMate3 HeartMate3 HeartMate3   Flow 5.5 5.5 5.4 5.6 5.6 5.6 5.3   Speed 6400 6400 6400 6400 6400 6400 6400   PI 2.0 1.5 2.1 1.8 1.6 1.9 2.8   Power (Jackson) 5.7 5.5 5.6 5.5 5.6 5.5 5.5   LSL 6000 6000 6000 6000 6000 6000 6000   Low Flow Alarm - - - - - - -   High Power Alarm - - - - - - -   Pulsatility Intermittent pulse Intermittent pulse Intermittent pulse - - - -         Chronic combined systolic and diastolic congestive heart failure  Currently appears clinically  euvolemic  Maintain K>4.0, Mg>2.0        Yadiel Coley PA-C  Heart Transplant  [facility] - Cardiology Stepdown

## 2022-09-30 NOTE — TELEPHONE ENCOUNTER
Call received from patient's wife. Wife is in a panic stating that patient is having heavy bleeding from right groin. Wife has called 911. Patient is conscious but dizzy. No VAD alarms. Advised for wife or son to hold pressure to right groin until EMS arrives. I notified Dr. Rodriguez and [facility]. Dr. Rodriguez also spoke with Dr. Castro as right groin site is the old ECMO site.     18:00- patient arrived to [facility] via EMS. Per charge RN, patient is still bleeding from right groin. Advised ER MD to contact CTS and vascular. Dr. Rodriguez made aware. Pt also does not have his VAD emergency bag; pt's wife or other family member to bring emergency bag to hospital.

## 2022-09-30 NOTE — NURSING
Patient refused assistance to bedside commode. Patient refused Lactulose x2. Patient educated on importance on taking medication.

## 2022-09-30 NOTE — ASSESSMENT & PLAN NOTE
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   -LDH is stable    Procedure: Device Interrogation Including analysis of device parameters  Current Settings: Ventricular Assist Device  Review of device function is stable    TXP LVAD INTERROGATIONS 9/30/2022 9/30/2022 9/30/2022 9/29/2022 9/29/2022 9/29/2022 9/29/2022   Type HeartMate3 HeartMate3 HeartMate3 HeartMate3 HeartMate3 HeartMate3 HeartMate3   Flow 5.5 5.5 5.4 5.6 5.6 5.6 5.3   Speed 6400 6400 6400 6400 6400 6400 6400   PI 2.0 1.5 2.1 1.8 1.6 1.9 2.8   Power (Jackson) 5.7 5.5 5.6 5.5 5.6 5.5 5.5   LSL 6000 6000 6000 6000 6000 6000 6000   Low Flow Alarm - - - - - - -   High Power Alarm - - - - - - -   Pulsatility Intermittent pulse Intermittent pulse Intermittent pulse - - - -

## 2022-09-30 NOTE — PROGRESS NOTES
09/30/2022  Estephania Martinez    Current provider:  Antonio Hadley Jr.,*    Device interrogation:  TXP LVAD INTERROGATIONS 9/30/2022 9/30/2022 9/30/2022 9/30/2022 9/29/2022 9/29/2022 9/29/2022   Type HeartMate3 HeartMate3 HeartMate3 HeartMate3 HeartMate3 HeartMate3 HeartMate3   Flow 5.4 5.5 5.5 5.4 5.6 5.6 5.6   Speed 6400 6400 6400 6400 6400 6400 6400   PI 1.8 2.0 1.5 2.1 1.8 1.6 1.9   Power (Jackson) 5.5 5.7 5.5 5.6 5.5 5.6 5.5   LSL 6000 6000 6000 6000 6000 6000 6000   Low Flow Alarm - - - - - - -   High Power Alarm - - - - - - -   Pulsatility Intermittent pulse Intermittent pulse Intermittent pulse Intermittent pulse - - -          Rounded on Tim Richards to ensure all mechanical assist device settings (IABP or VAD) were appropriate and all parameters were within limits.  I was able to ensure all back up equipment was present, the staff had no issues, and the Perfusion Department daily rounding was complete.      For implantable VADs: Interrogation of Ventricular assist device was performed with analysis of device parameters and review of device function. I have personally reviewed the interrogation findings and agree with findings as stated.     In emergency, the nursing units have been notified to contact the perfusion department either by:  Calling [phone] from 630am to 4pm Mon thru Fri, utilizing the On-Call Finder functionality of Epic and searching for Perfusion, or by contacting the hospital  from 4pm to 630am and on weekends and asking to speak with the perfusionist on call.    3:47 PM

## 2022-09-30 NOTE — PLAN OF CARE
Patient free from falls and injuries throughout shift.  AAO X 4 and VSS. Dopplers wnl. No alarms for shift. LVAD working WNL; dressing change due 10/01/2022. Continues on meropenem Q8. No acute events overnight. Patient resting well at this time.  Plan of care discussed with patient.  Patient verbalizes understanding.  Will continue to monitor.

## 2022-09-30 NOTE — PT/OT/SLP EVAL
"Physical Therapy Co-Evaluation and Co-Treatment    Patient Name:  Tim Richards   MRN:  [id number]    Recommendations:     Discharge Recommendations:  home health PT   Discharge Equipment Recommendations: none   Barriers to discharge: Inaccessible home    Assessment:     Tim Richards is a 56 y.o. male admitted with a medical diagnosis of Pseudoaneurysm of right femoral artery.  He presents with the following impairments/functional limitations:  weakness, impaired endurance, impaired self care skills, impaired functional mobility, gait instability, impaired balance, pain. Patient tolerated session well, limited by RLE pain and weakness. Patient discharged to rehab after last admission and was discharged home for 3 days before returning to hospital for this admission.     Rehab Prognosis: Good; patient would benefit from acute skilled PT services 4 x/week to address these deficits and reach maximum level of function.  Recent Surgery: Procedure(s) (LRB):  CREATION, BYPASS, ARTERIAL, ILIAC TO FEMORAL WITH GRAFT (Right)  CREATION, FLAP, MUSCLE ROTATION, SARTORIUS AND RECTUS FEMORIS (Right)  APPLICATION, WOUND VAC (Right) 3 Days Post-Op    Plan:     During this hospitalization, patient to be seen 4 x/week to address the identified rehab impairments via gait training, therapeutic activities, therapeutic exercises and progress toward the following goals:    Plan of Care Expires:  10/30/22    Subjective     Chief Complaint: R groin pain  Patient/Family Comments/Goals: "I don't want to sit up for long"  Pain/Comfort:  Pain Rating 1: 5/10  Location - Side 1: Right  Location 1: groin  Pain Addressed 1: Distraction  Pain Rating Post-Intervention 1:  (not rated)    Patients cultural, spiritual, Orthodox conflicts given the current situation: no    Living Environment:  Patient lives with their spouse in a 2-story house, number of outside stairs: 4 with no HR, bedroom/bathroom on 2nd floor, walk-in shower.  Prior to " admission, patient was independent with ADLs, not driving, not working. Reports difficulty with navigating stairs and was ambulating 110 ft prior to this admission. Patient uses DME as follows: walker, rolling. DME owned (not currently used): single point cane and wheelchair. Upon discharge, patient will have assistance from significant other.    Objective:     Communicated with nursing prior to session.  Patient found HOB elevated with PICC line, peripheral IV, CLEO drain, Tracheostomy, wound vac upon PT entry to room.    General Precautions: Standard, fall, LVAD   Orthopedic Precautions:N/A   Braces: N/A    Exams:  Cognitive Exam:  Patient is oriented to Person, Place, Time, Situation, follows commands 100% of the time  RLE ROM: WFL  RLE Strength: hip flexion 1/5, knee extension 1/5, and ankle dorsiflexion 4/5  LLE ROM: WFL  LLE Strength: WFL, grossly 4+/5  Sensation: Intact light touch to BLEs    Functional Mobility:  Bed Mobility:     Supine to Sit: stand by assistance  Sit to Supine: minimum assistance for LE management  Transfers:     Sit to Stand: minimum assistance with rolling walker  Toilet transfer: minimum assistance with rolling walker with cues for foot placement using Step Transfer  Gait: Patient ambulated 2x10 ft with rolling walker and contact guard assistance. Patient demonstrates occasional unsteady gait, ambulates outside BILL of RW, flexed posture, decreased arminda, and antalgic gait. Cued to ambulate within BILL of RW. All lines remained intact throughout ambulation trial.  Balance:   Static Sitting: Good, able to maintain for 5 minute(s) with supervision  Dynamic Sitting: Fair: Patient accepts minimal challenge, supervision  Static Standing: Good, able to maintain for 10 seconds with contact guard assistance  Dynamic Standing: Fair: Patient accepts minimal challenge, contact guard assistance    Therapeutic Activities and Exercises:  Patient educated on role of acute care PT and PT POC, safety  while in hospital including calling nurse for mobility, and call light usage  Patient is clear to stand pivot transfer with RN/PCT, assist x1  Patient performed 1 set(s) of 3 repetitions of  the following bed level exercises: quad sets for right LE. Patient required skilled PT for instruction of exercises and appropriate cues to perform exercises safely and appropriately. Instructed to perform 3x per day, 10 sets, 3 second hold  Educated about importance of OOB mobility    AM-PAC 6 CLICK MOBILITY  Total Score:16     Patient left HOB elevated with all lines intact and call button in reach.    GOALS:   Multidisciplinary Problems       Physical Therapy Goals          Problem: Physical Therapy    Goal Priority Disciplines Outcome Goal Variances Interventions   Physical Therapy Goal     PT, PT/OT Ongoing, Progressing     Description: Goals to be met by: 10/14/2022     Patient will increase functional independence with mobility by performin. Supine to sit with independence  2. Sit to supine with independence  3. Sit to stand transfer with supervision  4. Gait  x 200  feet with supervision using LRAD as needed  5. Ascend/descend 4 stair with no handrails stand by assistance using LRAD as needed  6. Lower extremity exercise program x10 reps per handout, with independence                        History:     Past Medical History:   Diagnosis Date    A-fib     Anticoagulant long-term use     Atrial flutter 2022    CHF (congestive heart failure)     Class 1 obesity due to excess calories with serious comorbidity and body mass index (BMI) of 31.0 to 31.9 in adult     Class 1 obesity due to excess calories with serious comorbidity and body mass index (BMI) of 32.0 to 32.9 in adult     Dilated cardiomyopathy 1/10/2018    Disorder of kidney and ureter     CKD    Encounter for blood transfusion     Essential hypertension 2022    Gout     HTN (hypertension)     Hx of psychiatric care     ICD (implantable  cardioverter-defibrillator) infection 7/1/2020    Psychiatric problem     Thyroid disease     Ventricular tachycardia (paroxysmal)        Past Surgical History:   Procedure Laterality Date    AORTIC VALVULOPLASTY N/A 7/13/2022    Procedure: REPAIR, AORTIC VALVE;  Surgeon: Yg Kaufman MD;  [facility];  Service: Cardiovascular;  Laterality: N/A;    APPLICATION OF WOUND VACUUM-ASSISTED CLOSURE DEVICE N/A 7/15/2022    Procedure: APPLICATION, WOUND VAC;  Surgeon: Yg Kaufman MD;  [facility];  Service: Cardiovascular;  Laterality: N/A;  50 x 5 cm     APPLICATION OF WOUND VACUUM-ASSISTED CLOSURE DEVICE Right 9/27/2022    Procedure: APPLICATION, WOUND VAC;  Surgeon: Kole Tabares MD;  [facility];  Service: Plastics;  Laterality: Right;    CARDIAC CATHETERIZATION  Dec. 2012    CARDIAC DEFIBRILLATOR PLACEMENT Left     CRRT-D    COLONOSCOPY N/A 3/6/2018    Procedure: COLONOSCOPY;  Surgeon: Alonso Bone MD;  Location: [facility] ENDO (2ND FLR);  Service: Endoscopy;  Laterality: N/A;    COLONOSCOPY N/A 7/17/2019    Procedure: COLONOSCOPY;  Surgeon: Blane Valdez MD;  Location: [facility] ENDO (2ND FLR);  Service: Endoscopy;  Laterality: N/A;    COLONOSCOPY N/A 7/18/2019    Procedure: COLONOSCOPY;  Surgeon: Blane Valdez MD;  Location: [facility] ENDO (2ND FLR);  Service: Endoscopy;  Laterality: N/A;    CREATION OF ILIOFEMORAL ARTERY BYPASS Right 9/27/2022    Procedure: CREATION, BYPASS, ARTERIAL, ILIAC TO FEMORAL WITH GRAFT;  Surgeon: Zach Hernández MD;  [facility];  Service: Peripheral Vascular;  Laterality: Right;    CREATION OF MUSCLE ROTATIONAL FLAP Right 9/27/2022    Procedure: CREATION, FLAP, MUSCLE ROTATION, SARTORIUS AND RECTUS FEMORIS;  Surgeon: oKle Tabares MD;  [facility];  Service: Plastics;  Laterality: Right;    ESOPHAGOGASTRODUODENOSCOPY N/A 7/17/2019    Procedure: EGD (ESOPHAGOGASTRODUODENOSCOPY);  Surgeon: Blane Valdez MD;   Location: [facility] ENDO (2ND FLR);  Service: Endoscopy;  Laterality: N/A;    ESOPHAGOGASTRODUODENOSCOPY N/A 7/18/2019    Procedure: EGD (ESOPHAGOGASTRODUODENOSCOPY);  Surgeon: Blane Valdez MD;  Location: [facility] ENDO (2ND FLR);  Service: Endoscopy;  Laterality: N/A;    FOREIGN BODY REMOVAL N/A 7/22/2022    Procedure: REMOVAL, FOREIGN BODY;  Surgeon: Yg Kaufman MD;  Location: [facility] OR [facility] FLR;  Service: Cardiovascular;  Laterality: N/A;  LVAD Heartmate 2 drive line removal    INSERTION OF GRAFT TO PERICARDIUM N/A 7/15/2022    Procedure: INSERTION, GRAFT, PERICARDIUM;  Surgeon: Yg Kaufman MD;  [facility];  Service: Cardiovascular;  Laterality: N/A;    IRRIGATION OF MEDIASTINUM N/A 7/15/2022    Procedure: IRRIGATION, MEDIASTINUM;  Surgeon: Yg Kaufman MD;  [facility];  Service: Cardiovascular;  Laterality: N/A;    LYSIS OF ADHESIONS  7/13/2022    Procedure: LYSIS, ADHESIONS;  Surgeon: Yg Kaufman MD;  [facility];  Service: Cardiovascular;;    NONINVASIVE CARDIAC ELECTROPHYSIOLOGY STUDY N/A 10/18/2019    Procedure: CARDIAC ELECTROPHYSIOLOGY STUDY, NONINVASIVE;  Surgeon: Raz Wagner MD;  Location: [facility] EP LAB;  Service: Cardiology;  Laterality: N/A;  VT, DFTs, MDT CRTD in situ, LVAD, anes, MB, 3098    REPLACEMENT OF IMPLANTABLE CARDIOVERTER-DEFIBRILLATOR (ICD) GENERATOR N/A 3/9/2020    Procedure: REPLACEMENT, ICD GENERATOR;  Surgeon: aHrry Yun MD;  Location: [facility] EP LAB;  Service: Cardiology;  Laterality: N/A;  VT, ICD Gen Change and Lead Revision, MDT, MAC, DM,3 Prep    REPLACEMENT OF LEFT VENTRICULAR ASSIST DEVICE (LVAD)  7/13/2022    Procedure: REPLACEMENT, LVAD;  Surgeon: Yg Kaufman MD;  [facility];  Service: Cardiovascular;;    REPLACEMENT OF PUMP N/A 7/13/2022    Procedure: REPLACEMENT, PUMP;  Surgeon: Yg Kaufman MD;  Location: [facility] OR 2ND FLR;  Service: Cardiovascular;  Laterality: N/A;  LVAD pump exchange  EXPLANATION OF  HEATMATE 2  IMPLANTATION OF HEARTMATE 3  IMPLANTATION OF 8MM CHIMNEY GRAFT TO RFA  INITIATION OF ECMO  TEMPORARY CLOSURE OF CHEST    REVISION OF IMPLANTABLE CARDIOVERTER-DEFIBRILLATOR (ICD) ELECTRODE LEAD PLACEMENT N/A 3/9/2020    Procedure: REVISION, INSERTION, ELECTRODE LEAD, ICD;  Surgeon: Harry Yun MD;  Location: [facility] EP LAB;  Service: Cardiology;  Laterality: N/A;  VT, ICD Gen Change and Lead Revision, MDT, MAC, DM,3 Prep    STERNAL WOUND CLOSURE N/A 7/14/2022    Procedure: CLOSURE, WOUND, STERNUM;  Surgeon: Yg Kaufman MD;  Location: [facility] OR [facility] FLR;  Service: Cardiovascular;  Laterality: N/A;  temporary closure  evacuation of hematoma    STERNAL WOUND CLOSURE N/A 7/15/2022    Procedure: CLOSURE, WOUND, STERNUM;  Surgeon: Yg Kaufman MD;  [facility];  Service: Cardiovascular;  Laterality: N/A;    TRACHEOSTOMY N/A 8/4/2022    Procedure: CREATION, TRACHEOSTOMY;  Surgeon: Germain Holt MD;  [facility];  Service: General;  Laterality: N/A;    TREATMENT OF CARDIAC ARRHYTHMIA  10/18/2019    Procedure: Cardioversion or Defibrillation;  Surgeon: Raz Wagner MD;  Location: [facility] EP LAB;  Service: Cardiology;;       Time Tracking:     PT Received On: 09/30/22  PT Start Time: 0951     PT Stop Time: 1026  PT Total Time (min): 35 min     Billable Minutes: Evaluation 8 min Gait Training 10 min and Therapeutic Activity 17 min      09/30/2022    Co-evaluation and co-treatment performed for this visit due to suspected patient need for two skilled therapists to ensure patient and staff safety and to accommodate for patient activity tolerance/pain management

## 2022-09-30 NOTE — PLAN OF CARE
PT eval complete, plan of care established    2022    Problem: Physical Therapy  Goal: Physical Therapy Goal  Description: Goals to be met by: 10/14/2022     Patient will increase functional independence with mobility by performin. Supine to sit with independence  2. Sit to supine with independence  3. Sit to stand transfer with supervision  4. Gait  x 200  feet with supervision using LRAD as needed  5. Ascend/descend 4 stair with no handrails stand by assistance using LRAD as needed  6. Lower extremity exercise program x10 reps per handout, with independence   Outcome: Ongoing, Progressing

## 2022-10-01 LAB
ALBUMIN SERPL BCP-MCNC: 1.9 G/DL (ref 3.5–5.2)
ALP SERPL-CCNC: 76 U/L (ref 55–135)
ALT SERPL W/O P-5'-P-CCNC: 11 U/L (ref 10–44)
ANION GAP SERPL CALC-SCNC: 4 MMOL/L (ref 8–16)
APTT BLDCRRT: 42.2 SEC (ref 21–32)
AST SERPL-CCNC: 20 U/L (ref 10–40)
BASOPHILS # BLD AUTO: 0.01 K/UL (ref 0–0.2)
BASOPHILS # BLD AUTO: 0.02 K/UL (ref 0–0.2)
BASOPHILS # BLD AUTO: 0.02 K/UL (ref 0–0.2)
BASOPHILS NFR BLD: 0.1 % (ref 0–1.9)
BASOPHILS NFR BLD: 0.2 % (ref 0–1.9)
BASOPHILS NFR BLD: 0.2 % (ref 0–1.9)
BILIRUB DIRECT SERPL-MCNC: 0.3 MG/DL (ref 0.1–0.3)
BILIRUB SERPL-MCNC: 0.4 MG/DL (ref 0.1–1)
BUN SERPL-MCNC: 12 MG/DL (ref 6–20)
CALCIUM SERPL-MCNC: 8.6 MG/DL (ref 8.7–10.5)
CHLORIDE SERPL-SCNC: 105 MMOL/L (ref 95–110)
CO2 SERPL-SCNC: 25 MMOL/L (ref 23–29)
CREAT SERPL-MCNC: 0.9 MG/DL (ref 0.5–1.4)
DIFFERENTIAL METHOD: ABNORMAL
EOSINOPHIL # BLD AUTO: 0.3 K/UL (ref 0–0.5)
EOSINOPHIL # BLD AUTO: 0.3 K/UL (ref 0–0.5)
EOSINOPHIL # BLD AUTO: 0.4 K/UL (ref 0–0.5)
EOSINOPHIL NFR BLD: 2.6 % (ref 0–8)
EOSINOPHIL NFR BLD: 2.7 % (ref 0–8)
EOSINOPHIL NFR BLD: 3.8 % (ref 0–8)
ERYTHROCYTE [DISTWIDTH] IN BLOOD BY AUTOMATED COUNT: 14.7 % (ref 11.5–14.5)
ERYTHROCYTE [DISTWIDTH] IN BLOOD BY AUTOMATED COUNT: 14.8 % (ref 11.5–14.5)
ERYTHROCYTE [DISTWIDTH] IN BLOOD BY AUTOMATED COUNT: 14.9 % (ref 11.5–14.5)
EST. GFR  (NO RACE VARIABLE): >60 ML/MIN/1.73 M^2
GLUCOSE SERPL-MCNC: 70 MG/DL (ref 70–110)
HCT VFR BLD AUTO: 26.6 % (ref 40–54)
HCT VFR BLD AUTO: 28.2 % (ref 40–54)
HCT VFR BLD AUTO: 28.3 % (ref 40–54)
HGB BLD-MCNC: 8.1 G/DL (ref 14–18)
HGB BLD-MCNC: 8.5 G/DL (ref 14–18)
HGB BLD-MCNC: 8.5 G/DL (ref 14–18)
IMM GRANULOCYTES # BLD AUTO: 0.07 K/UL (ref 0–0.04)
IMM GRANULOCYTES # BLD AUTO: 0.09 K/UL (ref 0–0.04)
IMM GRANULOCYTES # BLD AUTO: 0.1 K/UL (ref 0–0.04)
IMM GRANULOCYTES NFR BLD AUTO: 0.7 % (ref 0–0.5)
IMM GRANULOCYTES NFR BLD AUTO: 0.9 % (ref 0–0.5)
IMM GRANULOCYTES NFR BLD AUTO: 1 % (ref 0–0.5)
INR PPP: 1.1 (ref 0.8–1.2)
LDH SERPL L TO P-CCNC: 241 U/L (ref 110–260)
LYMPHOCYTES # BLD AUTO: 0.6 K/UL (ref 1–4.8)
LYMPHOCYTES # BLD AUTO: 0.8 K/UL (ref 1–4.8)
LYMPHOCYTES # BLD AUTO: 1 K/UL (ref 1–4.8)
LYMPHOCYTES NFR BLD: 10.1 % (ref 18–48)
LYMPHOCYTES NFR BLD: 6 % (ref 18–48)
LYMPHOCYTES NFR BLD: 7.9 % (ref 18–48)
MAGNESIUM SERPL-MCNC: 2.1 MG/DL (ref 1.6–2.6)
MCH RBC QN AUTO: 27.4 PG (ref 27–31)
MCH RBC QN AUTO: 27.5 PG (ref 27–31)
MCH RBC QN AUTO: 27.6 PG (ref 27–31)
MCHC RBC AUTO-ENTMCNC: 30 G/DL (ref 32–36)
MCHC RBC AUTO-ENTMCNC: 30.1 G/DL (ref 32–36)
MCHC RBC AUTO-ENTMCNC: 30.5 G/DL (ref 32–36)
MCV RBC AUTO: 91 FL (ref 82–98)
MONOCYTES # BLD AUTO: 0.7 K/UL (ref 0.3–1)
MONOCYTES # BLD AUTO: 0.8 K/UL (ref 0.3–1)
MONOCYTES # BLD AUTO: 0.8 K/UL (ref 0.3–1)
MONOCYTES NFR BLD: 6.6 % (ref 4–15)
MONOCYTES NFR BLD: 7.8 % (ref 4–15)
MONOCYTES NFR BLD: 8.4 % (ref 4–15)
NEUTROPHILS # BLD AUTO: 7.4 K/UL (ref 1.8–7.7)
NEUTROPHILS # BLD AUTO: 8.2 K/UL (ref 1.8–7.7)
NEUTROPHILS # BLD AUTO: 8.2 K/UL (ref 1.8–7.7)
NEUTROPHILS NFR BLD: 78.1 % (ref 38–73)
NEUTROPHILS NFR BLD: 79.3 % (ref 38–73)
NEUTROPHILS NFR BLD: 83.6 % (ref 38–73)
NRBC BLD-RTO: 0 /100 WBC
PHOSPHATE SERPL-MCNC: 2 MG/DL (ref 2.7–4.5)
PLATELET # BLD AUTO: 241 K/UL (ref 150–450)
PLATELET # BLD AUTO: 244 K/UL (ref 150–450)
PLATELET # BLD AUTO: 254 K/UL (ref 150–450)
PMV BLD AUTO: 9.8 FL (ref 9.2–12.9)
PMV BLD AUTO: 9.9 FL (ref 9.2–12.9)
PMV BLD AUTO: 9.9 FL (ref 9.2–12.9)
POTASSIUM SERPL-SCNC: 3.6 MMOL/L (ref 3.5–5.1)
PROT SERPL-MCNC: 5.5 G/DL (ref 6–8.4)
PROTHROMBIN TIME: 11.3 SEC (ref 9–12.5)
RBC # BLD AUTO: 2.93 M/UL (ref 4.6–6.2)
RBC # BLD AUTO: 3.09 M/UL (ref 4.6–6.2)
RBC # BLD AUTO: 3.1 M/UL (ref 4.6–6.2)
SODIUM SERPL-SCNC: 134 MMOL/L (ref 136–145)
WBC # BLD AUTO: 10.35 K/UL (ref 3.9–12.7)
WBC # BLD AUTO: 9.46 K/UL (ref 3.9–12.7)
WBC # BLD AUTO: 9.78 K/UL (ref 3.9–12.7)

## 2022-10-01 PROCEDURE — 25000003 PHARM REV CODE 250: Performed by: STUDENT IN AN ORGANIZED HEALTH CARE EDUCATION/TRAINING PROGRAM

## 2022-10-01 PROCEDURE — 93750 PR INTERROGATE VENT ASSIST DEV, IN PERSON, W PHYSICIAN ANALYSIS: ICD-10-PCS | Mod: ,,, | Performed by: INTERNAL MEDICINE

## 2022-10-01 PROCEDURE — 99233 SBSQ HOSP IP/OBS HIGH 50: CPT | Mod: ,,, | Performed by: PHYSICIAN ASSISTANT

## 2022-10-01 PROCEDURE — A4216 STERILE WATER/SALINE, 10 ML: HCPCS | Performed by: INTERNAL MEDICINE

## 2022-10-01 PROCEDURE — 27201112

## 2022-10-01 PROCEDURE — 99900026 HC AIRWAY MAINTENANCE (STAT)

## 2022-10-01 PROCEDURE — 99233 PR SUBSEQUENT HOSPITAL CARE,LEVL III: ICD-10-PCS | Mod: ,,, | Performed by: PHYSICIAN ASSISTANT

## 2022-10-01 PROCEDURE — 94761 N-INVAS EAR/PLS OXIMETRY MLT: CPT

## 2022-10-01 PROCEDURE — 85730 THROMBOPLASTIN TIME PARTIAL: CPT | Performed by: PHYSICIAN ASSISTANT

## 2022-10-01 PROCEDURE — 63600175 PHARM REV CODE 636 W HCPCS: Performed by: STUDENT IN AN ORGANIZED HEALTH CARE EDUCATION/TRAINING PROGRAM

## 2022-10-01 PROCEDURE — 25000003 PHARM REV CODE 250: Performed by: INTERNAL MEDICINE

## 2022-10-01 PROCEDURE — 85610 PROTHROMBIN TIME: CPT | Performed by: STUDENT IN AN ORGANIZED HEALTH CARE EDUCATION/TRAINING PROGRAM

## 2022-10-01 PROCEDURE — 85025 COMPLETE CBC W/AUTO DIFF WBC: CPT | Mod: 91 | Performed by: NURSE PRACTITIONER

## 2022-10-01 PROCEDURE — 27000207 HC ISOLATION

## 2022-10-01 PROCEDURE — 84100 ASSAY OF PHOSPHORUS: CPT | Performed by: PHYSICIAN ASSISTANT

## 2022-10-01 PROCEDURE — 25000003 PHARM REV CODE 250: Performed by: PHYSICIAN ASSISTANT

## 2022-10-01 PROCEDURE — 93750 INTERROGATION VAD IN PERSON: CPT | Mod: ,,, | Performed by: INTERNAL MEDICINE

## 2022-10-01 PROCEDURE — 83735 ASSAY OF MAGNESIUM: CPT | Performed by: NURSE PRACTITIONER

## 2022-10-01 PROCEDURE — 83615 LACTATE (LD) (LDH) ENZYME: CPT | Performed by: NURSE PRACTITIONER

## 2022-10-01 PROCEDURE — 80076 HEPATIC FUNCTION PANEL: CPT | Performed by: NURSE PRACTITIONER

## 2022-10-01 PROCEDURE — 63600175 PHARM REV CODE 636 W HCPCS: Performed by: PHYSICIAN ASSISTANT

## 2022-10-01 PROCEDURE — 99900035 HC TECH TIME PER 15 MIN (STAT)

## 2022-10-01 PROCEDURE — 80048 BASIC METABOLIC PNL TOTAL CA: CPT | Performed by: NURSE PRACTITIONER

## 2022-10-01 PROCEDURE — 20600001 HC STEP DOWN PRIVATE ROOM

## 2022-10-01 PROCEDURE — 25000003 PHARM REV CODE 250: Performed by: NURSE PRACTITIONER

## 2022-10-01 PROCEDURE — 27000248 HC VAD-ADDITIONAL DAY

## 2022-10-01 RX ORDER — BISACODYL 10 MG
10 SUPPOSITORY, RECTAL RECTAL DAILY PRN
Status: DISCONTINUED | OUTPATIENT
Start: 2022-10-01 | End: 2022-10-05 | Stop reason: HOSPADM

## 2022-10-01 RX ORDER — ONDANSETRON 2 MG/ML
4 INJECTION INTRAMUSCULAR; INTRAVENOUS EVERY 6 HOURS PRN
Status: DISCONTINUED | OUTPATIENT
Start: 2022-10-02 | End: 2022-10-05 | Stop reason: HOSPADM

## 2022-10-01 RX ADMIN — PANTOPRAZOLE SODIUM 40 MG: 40 TABLET, DELAYED RELEASE ORAL at 02:10

## 2022-10-01 RX ADMIN — Medication 10 ML: at 06:10

## 2022-10-01 RX ADMIN — ALPRAZOLAM 1 MG: 0.5 TABLET ORAL at 09:10

## 2022-10-01 RX ADMIN — Medication 400 MG: at 02:10

## 2022-10-01 RX ADMIN — ACETAMINOPHEN 1000 MG: 500 TABLET ORAL at 08:10

## 2022-10-01 RX ADMIN — MIRTAZAPINE 30 MG: 30 TABLET, FILM COATED ORAL at 09:10

## 2022-10-01 RX ADMIN — LEVOTHYROXINE SODIUM 50 MCG: 50 TABLET ORAL at 05:10

## 2022-10-01 RX ADMIN — ONDANSETRON 4 MG: 2 INJECTION INTRAMUSCULAR; INTRAVENOUS at 11:10

## 2022-10-01 RX ADMIN — Medication 10 ML: at 12:10

## 2022-10-01 RX ADMIN — HEPARIN SODIUM 12 UNITS/KG/HR: 10000 INJECTION, SOLUTION INTRAVENOUS at 09:10

## 2022-10-01 RX ADMIN — AMIODARONE HYDROCHLORIDE 400 MG: 200 TABLET ORAL at 08:10

## 2022-10-01 RX ADMIN — LACTULOSE 15 G: 20 SOLUTION ORAL at 05:10

## 2022-10-01 RX ADMIN — MEXILETINE HYDROCHLORIDE 250 MG: 250 CAPSULE ORAL at 05:10

## 2022-10-01 RX ADMIN — ACETAMINOPHEN 1000 MG: 500 TABLET ORAL at 04:10

## 2022-10-01 RX ADMIN — ERTAPENEM 1 G: 1 INJECTION INTRAMUSCULAR; INTRAVENOUS at 02:10

## 2022-10-01 RX ADMIN — BISACODYL 10 MG: 10 SUPPOSITORY RECTAL at 09:10

## 2022-10-01 RX ADMIN — WARFARIN SODIUM 5 MG: 5 TABLET ORAL at 04:10

## 2022-10-01 RX ADMIN — MEXILETINE HYDROCHLORIDE 250 MG: 250 CAPSULE ORAL at 02:10

## 2022-10-01 RX ADMIN — MEXILETINE HYDROCHLORIDE 250 MG: 250 CAPSULE ORAL at 09:10

## 2022-10-01 NOTE — NURSING
Plan of care discussed with patient.  Patient x1-2 assist, fall precautions in place. LVAD DP and numbers WNL, smooth LVAD hum. VSS. Patient has no complaints of pain. Discussed medications and care. Heparin gtt infusing @ 12units/kg/hr. Tolerating IV ABX well. Offered to do LVAD dressing change since pt's spouse couldn't stay today, pt refused and requested for it to be done on night shift. Patient has no questions at this time. Will continue to monitor.

## 2022-10-01 NOTE — SUBJECTIVE & OBJECTIVE
**Interval History: Post op day 4. Doing well without any evidence of bleeding. Pain under control. Hasn't had a BM postoperatively and reports little gas; Lactulose added yesterday but he only took one dose.  Will continue and add suppository today.   INR is 1.1; will give 5mg today and monitor trend    Dispo pending therapeutic INR and removal of trach with Dr. Holt    Continuous Infusions:   heparin (porcine) in D5W 12 Units/kg/hr (10/01/22 0901)     Scheduled Meds:   acetaminophen  1,000 mg Oral TID    amiodarone  400 mg Oral Daily    ertapenem (INVANZ) IVPB  1 g Intravenous Q24H    lactulose  15 g Oral TID    levothyroxine  50 mcg Oral Before breakfast    magnesium oxide  400 mg Oral Daily with lunch    mexiletine  250 mg Oral Q8H    mirtazapine  30 mg Oral QHS    pantoprazole  40 mg Oral Daily with lunch    polyethylene glycol  17 g Oral Daily    sodium chloride 0.9%  10 mL Intravenous Q6H    warfarin  5 mg Oral Daily     PRN Meds:sodium chloride, sodium chloride, sodium chloride 0.9%, sodium chloride 0.9%, ALPRAZolam, bisacodyL, oxyCODONE, oxyCODONE, Flushing PICC Protocol **AND** sodium chloride 0.9% **AND** sodium chloride 0.9%, zolpidem    Review of patient's allergies indicates:   Allergen Reactions    Lisinopril Anaphylaxis    Hydralazine analogues      Chronic constipation, impotence, dizziness     Objective:     Vital Signs (Most Recent):  Temp: 98 °F (36.7 °C) (10/01/22 0500)  Pulse: 82 (10/01/22 1038)  Resp: 17 (10/01/22 0850)  BP: 105/67 (10/01/22 0500)  SpO2: 97 % (10/01/22 0850)   Vital Signs (24h Range):  Temp:  [98 °F (36.7 °C)-98.7 °F (37.1 °C)] 98 °F (36.7 °C)  Pulse:  [72-86] 82  Resp:  [16-18] 17  SpO2:  [94 %-99 %] 97 %  BP: ()/(0-77) 105/67     Patient Vitals for the past 72 hrs (Last 3 readings):   Weight   10/01/22 0524 85.8 kg (189 lb 2.5 oz)   09/30/22 0528 86 kg (189 lb 9.5 oz)   09/29/22 0529 82.2 kg (181 lb 3.5 oz)       Body mass index is 24.96 kg/m².      Intake/Output  Summary (Last 24 hours) at 10/1/2022 1050  Last data filed at 10/1/2022 0524  Gross per 24 hour   Intake 150 ml   Output 504 ml   Net -354 ml         Hemodynamic Parameters:       Telemetry: SR    Physical Exam  Constitutional:       Appearance: Normal appearance.   HENT:      Head: Normocephalic and atraumatic.   Eyes:      Extraocular Movements: Extraocular movements intact.      Pupils: Pupils are equal, round, and reactive to light.   Neck:      Comments: Neck veins are not elevated  Cardiovascular:      Rate and Rhythm: Normal rate and regular rhythm.      Comments: Smooth VAD hum  Pulmonary:      Effort: Pulmonary effort is normal.      Breath sounds: Normal breath sounds.   Abdominal:      General: Bowel sounds are normal. There is no distension.      Palpations: Abdomen is soft.      Tenderness: There is no abdominal tenderness.      Comments: DLES dressing c/d/i   Musculoskeletal:         General: No swelling. Normal range of motion.      Cervical back: Normal range of motion and neck supple.      Right lower leg: No edema.      Left lower leg: No edema.      Comments: R groin with wound vac and CLEO drain. No drainage or bleeding.    Skin:     General: Skin is warm and dry.      Capillary Refill: Capillary refill takes 2 to 3 seconds.   Neurological:      General: No focal deficit present.      Mental Status: He is alert and oriented to person, place, and time.   Psychiatric:         Mood and Affect: Mood normal.         Behavior: Behavior normal.         Thought Content: Thought content normal.         Judgment: Judgment normal.       Significant Labs:  CBC:  Recent Labs   Lab 09/30/22  1040 10/01/22  0507 10/01/22  0906   WBC 10.21  10.21 10.35 9.78   RBC 3.16*  3.16* 3.09* 3.10*   HGB 8.6*  8.6* 8.5* 8.5*   HCT 29.9*  29.9* 28.2* 28.3*     221 254 241   MCV 95  95 91 91   MCH 27.2  27.2 27.5 27.4   MCHC 28.8*  28.8* 30.1* 30.0*       BNP:  Recent Labs   Lab 09/26/22  0353 09/28/22  0332  09/30/22  0546   BNP 97 787* 179*       CMP:  Recent Labs   Lab 09/29/22  0451 09/30/22  0546 10/01/22  0507   GLU 62* 76 70   CALCIUM 9.0 8.8 8.6*   ALBUMIN 2.2* 2.0* 1.9*   PROT 6.0 5.8* 5.5*   * 136 134*   K 4.0 4.0 3.6   CO2 24 25 25    104 105   BUN 14 13 12   CREATININE 1.2 1.2 0.9   ALKPHOS 87 85 76   ALT 17 17 11   AST 27 24 20   BILITOT 0.6 0.5 0.4        Coagulation:   Recent Labs   Lab 09/29/22 0451 09/29/22  0740 09/29/22 2129 09/30/22  0546 10/01/22  0507   INR 1.1  --   --  1.1 1.1   APTT 32.4*   < > 39.0* 40.7* 42.2*    < > = values in this interval not displayed.       LDH:  Recent Labs   Lab 09/29/22 0451 09/30/22  0546 10/01/22  0507   * 273* 241       Microbiology:  Microbiology Results (last 7 days)       Procedure Component Value Units Date/Time    Aerobic culture [[id number]]  (Abnormal)  (Susceptibility) Collected: 09/27/22 0944    Order Status: Completed Specimen: Wound from Groin Updated: 09/30/22 1145     Aerobic Bacterial Culture ESCHERICHIA COLI ESBL  Moderate      Narrative:      1. RIGHT GROIN WOUND    Blood culture [[id number]] Collected: 09/25/22 0138    Order Status: Completed Specimen: Blood from Peripheral, Hand, Right Updated: 09/30/22 0612     Blood Culture, Routine No growth after 5 days.    Narrative:      Collection has been rescheduled by TR3 at 09/25/2022 00:31 Reason: Pt   dont wanna get stuck beni tabor  Collection has been rescheduled by TR3 at 09/25/2022 00:31 Reason: Pt   dont wanna get stuck beni tabor    Blood culture [[id number]] Collected: 09/25/22 0133    Order Status: Completed Specimen: Blood from Peripheral, Lower Arm, Left Updated: 09/30/22 0612     Blood Culture, Routine No growth after 5 days.    Narrative:      Collection has been rescheduled by TR3 at 09/25/2022 00:31 Reason: Pt   dont randell get stuck beni tabor  Collection has been rescheduled by TR3 at 09/25/2022 00:31 Reason: Pt   dont wanna get stuck beni tabor    Aerobic culture  [[id number]]  (Abnormal)  (Susceptibility) Collected: 09/27/22 0944    Order Status: Completed Specimen: Wound from Groin Updated: 09/29/22 1300     Aerobic Bacterial Culture ESCHERICHIA COLI  Rare      Narrative:      4. FEMORAL TISSUE RIGHT GROIN    Aerobic culture [[id number]]  (Abnormal)  (Susceptibility) Collected: 09/27/22 0944    Order Status: Completed Specimen: Wound from Groin Updated: 09/29/22 1259     Aerobic Bacterial Culture ESCHERICHIA COLI ESBL  Rare      Narrative:      3. RIGHT FEMORAL GRAFT    Aerobic culture [[id number]]  (Abnormal)  (Susceptibility) Collected: 09/27/22 0944    Order Status: Completed Specimen: Wound from Groin Updated: 09/29/22 1248     Aerobic Bacterial Culture CITROBACTER FARMERI  Rare      Narrative:      2. RIGHT FEMORAL ARTERY    Culture, Anaerobe [[id number]] Collected: 09/27/22 0944    Order Status: Completed Specimen: Wound from Groin Updated: 09/29/22 1059     Anaerobic Culture Culture in progress    Narrative:      4. FEMORAL TISSUE RIGHT GROIN    Culture, Anaerobe [[id number]] Collected: 09/27/22 0944    Order Status: Completed Specimen: Wound from Groin Updated: 09/29/22 1058     Anaerobic Culture Culture in progress    Narrative:      3. RIGHT FEMORAL GRAFT    Culture, Anaerobe [[id number]] Collected: 09/27/22 0944    Order Status: Completed Specimen: Wound from Groin Updated: 09/29/22 1057     Anaerobic Culture Culture in progress    Narrative:      2. RIGHT FEMORAL ARTERY    Culture, Anaerobe [[id number]] Collected: 09/27/22 0944    Order Status: Completed Specimen: Wound from Groin Updated: 09/29/22 1056     Anaerobic Culture Culture in progress    Narrative:      1. RIGHT GROIN WOUND    Culture, Anaerobe [[id number]] Collected: 09/25/22 0815    Order Status: Completed Specimen: Wound from Groin Updated: 09/29/22 0932     Anaerobic Culture No anaerobes isolated    AFB Culture & Smear [[id number]] Collected: 09/27/22 0944    Order Status: Completed Specimen: Wound from  Groin Updated: 09/29/22 0927     AFB Culture & Smear Culture in progress     AFB CULTURE STAIN No acid fast bacilli seen.    Narrative:      1. RIGHT GROIN WOUND    AFB Culture & Smear [[id number]] Collected: 09/27/22 0944    Order Status: Completed Specimen: Wound from Groin Updated: 09/28/22 2127     AFB Culture & Smear Culture in progress     AFB CULTURE STAIN No acid fast bacilli seen.    Narrative:      2. RIGHT FEMORAL ARTERY    AFB Culture & Smear [[id number]] Collected: 09/27/22 0944    Order Status: Completed Specimen: Wound from Groin Updated: 09/28/22 2127     AFB Culture & Smear Culture in progress     AFB CULTURE STAIN No acid fast bacilli seen.    Narrative:      3. RIGHT FEMORAL GRAFT    AFB Culture & Smear [[id number]] Collected: 09/27/22 0944    Order Status: Completed Specimen: Wound from Groin Updated: 09/28/22 2127     AFB Culture & Smear Culture in progress     AFB CULTURE STAIN No acid fast bacilli seen.    Narrative:      4. FEMORAL TISSUE RIGHT GROIN    Gram stain [[id number]] Collected: 09/27/22 0944    Order Status: Completed Specimen: Wound from Groin Updated: 09/27/22 1312     Gram Stain Result Few WBC's      No organisms seen    Narrative:      1. RIGHT GROIN WOUND    Gram stain [[id number]] Collected: 09/27/22 0944    Order Status: Completed Specimen: Wound from Groin Updated: 09/27/22 1204     Gram Stain Result No WBC's      No organisms seen    Narrative:      2. RIGHT FEMORAL ARTERY    Gram stain [[id number]] Collected: 09/27/22 0944    Order Status: Completed Specimen: Wound from Groin Updated: 09/27/22 1203     Gram Stain Result No WBC's      No organisms seen    Narrative:      3. RIGHT FEMORAL GRAFT    Gram stain [[id number]] Collected: 09/27/22 0944    Order Status: Completed Specimen: Wound from Groin Updated: 09/27/22 1202     Gram Stain Result No WBC's      No organisms seen    Narrative:      4. FEMORAL TISSUE RIGHT GROIN    Fungus culture [[id number]] Collected: 09/27/22 0944     Order Status: Sent Specimen: Wound from Groin Updated: 09/27/22 1036    Fungus culture [[id number]] Collected: 09/27/22 0944    Order Status: Sent Specimen: Wound from Groin Updated: 09/27/22 1034    Fungus culture [[id number]] Collected: 09/27/22 0944    Order Status: Sent Specimen: Wound from Groin Updated: 09/27/22 1032    Fungus culture [[id number]] Collected: 09/27/22 0944    Order Status: Sent Specimen: Wound from Groin Updated: 09/27/22 1029    Aerobic culture [[id number]]  (Abnormal)  (Susceptibility) Collected: 09/25/22 0815    Order Status: Completed Specimen: Wound from Groin Updated: 09/27/22 0945     Aerobic Bacterial Culture ESCHERICHIA COLI ESBL  Few      Fungus culture [[id number]] Collected: 09/25/22 0815    Order Status: Completed Specimen: Wound from Groin Updated: 09/26/22 1150     Fungus (Mycology) Culture Culture in progress    Blood culture [[id number]]     Order Status: Canceled Specimen: Blood     Blood culture [[id number]]     Order Status: Canceled Specimen: Blood             I have reviewed all pertinent labs within the past 24 hours.    Estimated Creatinine Clearance: 104.8 mL/min (based on SCr of 0.9 mg/dL).    Diagnostic Results:  I have reviewed and interpreted all pertinent imaging results/findings within the past 24 hours.

## 2022-10-01 NOTE — PROGRESS NOTES
[facility]  Vascular Surgery  Progress Note    Patient Name: Tim Richards  MRN: [id number]  Admission Date: 9/24/2022  Primary Care Provider: Deyanira Booth MD    Subjective:     Interval History: NAEON, AF, VSS. Patient had wv changed recently and had good healthy tissue. Denies any pain. Reports some mild constipation, otherwise no other complaints.     Post-Op Info:  Procedure(s) (LRB):  CREATION, BYPASS, ARTERIAL, ILIAC TO FEMORAL WITH GRAFT (Right)  CREATION, FLAP, MUSCLE ROTATION, SARTORIUS AND RECTUS FEMORIS (Right)  APPLICATION, WOUND VAC (Right)   4 Days Post-Op       Medications:  Continuous Infusions:   heparin (porcine) in D5W 12 Units/kg/hr (09/30/22 0709)     Scheduled Meds:   acetaminophen  1,000 mg Oral TID    amiodarone  400 mg Oral Daily    ertapenem (INVANZ) IVPB  1 g Intravenous Q24H    lactulose  15 g Oral TID    levothyroxine  50 mcg Oral Before breakfast    magnesium oxide  400 mg Oral Daily with lunch    mexiletine  250 mg Oral Q8H    mirtazapine  30 mg Oral QHS    pantoprazole  40 mg Oral Daily with lunch    polyethylene glycol  17 g Oral Daily    sodium chloride 0.9%  10 mL Intravenous Q6H    warfarin  5 mg Oral Daily     PRN Meds:sodium chloride, sodium chloride, sodium chloride 0.9%, sodium chloride 0.9%, ALPRAZolam, oxyCODONE, oxyCODONE, Flushing PICC Protocol **AND** sodium chloride 0.9% **AND** sodium chloride 0.9%, zolpidem     Objective:     Vital Signs (Most Recent):  Temp: 98 °F (36.7 °C) (10/01/22 0500)  Pulse: 84 (10/01/22 0744)  Resp: 18 (10/01/22 0744)  BP: 105/67 (10/01/22 0500)  SpO2: 96 % (10/01/22 0744) Vital Signs (24h Range):  Temp:  [98 °F (36.7 °C)-98.9 °F (37.2 °C)] 98 °F (36.7 °C)  Pulse:  [70-94] 84  Resp:  [16-18] 18  SpO2:  [94 %-99 %] 96 %  BP: ()/(0-80) 105/67         Physical Exam  Constitutional:       Appearance: Normal appearance.   HENT:      Head: Normocephalic and atraumatic.      Mouth/Throat:      Mouth:  Mucous membranes are moist.   Eyes:      Pupils: Pupils are equal, round, and reactive to light.   Cardiovascular:      Rate and Rhythm: Regular rhythm.   Abdominal:      General: Abdomen is flat.      Palpations: Abdomen is soft.   Musculoskeletal:      Comments: 5/5 strength all 4 extremities, biphasic waveforms DP bilaterally  Right groin area with wound vac and CLEO drain   Neurological:      Mental Status: He is alert.       Significant Labs:  ABGs: No results for input(s): PH, PCO2, PO2, HCO3, POCSATURATED, BE in the last 48 hours.  Cardiac markers: No results for input(s): CKMB, CPKMB, TROPONINT, TROPONINI, MYOGLOBIN in the last 48 hours.  CBC:   Recent Labs   Lab 10/01/22  0507   WBC 10.35   RBC 3.09*   HGB 8.5*   HCT 28.2*      MCV 91   MCH 27.5   MCHC 30.1*     CMP:   Recent Labs   Lab 10/01/22  0507   GLU 70   CALCIUM 8.6*   ALBUMIN 1.9*   PROT 5.5*   *   K 3.6   CO2 25      BUN 12   CREATININE 0.9   ALKPHOS 76   ALT 11   AST 20   BILITOT 0.4     Coagulation:   Recent Labs   Lab 10/01/22  0507   LABPROT 11.3   INR 1.1   APTT 42.2*     Lactic Acid: No results for input(s): LACTATE in the last 48 hours.  All pertinent labs from the last 24 hours have been reviewed.    Significant Diagnostics:  I have reviewed all pertinent imaging results/findings within the past 24 hours.    Assessment/Plan:     * Pseudoaneurysm of right femoral artery  Tim Richards is a 56 y.o. 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 Vascular surgery has been consulted for evaluation of right groin site concerning for pseudoaneurym. Patient is now s/p Rt groin exploration, creation of Right Ileofemoral bypass, and creation of muscle flap 9/27/22    Recommend:  - Infectious disease on the case  - ABx: daptomycin, meropenem  -  for PT/OT and IV abx  -  Anticoagulation per primary team  - WV and CLEO drainage to be taken care of by plastic surgery team  - F/u appointment in 4 weeks with SADAF/TBI.  - Ok from vascular perspective to discharge once primary team issues are addressed.             Jc Sal MD  Vascular Surgery  [facility]

## 2022-10-01 NOTE — RESPIRATORY THERAPY
[facility]ID RESPONSE RESPIRATORY THERAPY PROACTIVE NOTE           Time of visit: 850     Code Status: Full Code   : 1965  Bed: 315/315 A:   MRN: [id number]  Time spent at the bedside: < 15 min    SITUATION    Evaluated patient for: LDA Check     BACKGROUND    Patient has a past medical history of A-fib, Anticoagulant long-term use, Atrial flutter, CHF (congestive heart failure), Class 1 obesity due to excess calories with serious comorbidity and body mass index (BMI) of 31.0 to 31.9 in adult, Class 1 obesity due to excess calories with serious comorbidity and body mass index (BMI) of 32.0 to 32.9 in adult, Dilated cardiomyopathy, Disorder of kidney and ureter, Encounter for blood transfusion, Essential hypertension, Gout, HTN (hypertension), psychiatric care, ICD (implantable cardioverter-defibrillator) infection, Psychiatric problem, Thyroid disease, and Ventricular tachycardia (paroxysmal).  Clinically Significant Surgical Hx: tracheostomy    24 Hours Vitals Range:  Temp:  [98 °F (36.7 °C)-98.7 °F (37.1 °C)]   Pulse:  [72-86]   Resp:  [16-18]   BP: ()/(0-77)   SpO2:  [94 %-99 %]     Labs:    Recent Labs     22  0451 22  0546 10/01/22  0507   * 136 134*   K 4.0 4.0 3.6    104 105   CO2 24 25 25   CREATININE 1.2 1.2 0.9   GLU 62* 76 70   PHOS 2.7 2.3* 2.0*   MG 2.0 2.0 2.1        No results for input(s): PH, PCO2, PO2, HCO3, POCSATURATED, BE in the last 72 hours.    ASSESSMENT/INTERVENTIONS  Pt resting in bed, no need for respiratory intervention at time of visit. Trach currently capped, clean, and secure.      Last VS   Temp: 98 °F (36.7 °C) (10/01 0500)  Pulse: 82 (10/01 1038)  Resp: 17 (10/01 0850)  BP: 105/67 (10/01 0500)  SpO2: 97 % (10/01 0850)      Extra trachs at bedside: 8.0 & 6.0 Shiley Cuffed  Level of Consciousness: Level of Consciousness (AVPU): alert  Respiratory Effort: Respiratory Effort: Unlabored Expansion/Accessory Muscle Usage: Expansion/Accessory  Muscles/Retractions: no use of accessory muscles, no retractions, expansion symmetric  All Lung Field Breath Sounds: All Lung Fields Breath Sounds: Anterior:, Lateral:, clear  SHERWIN Breath Sounds: clear  LLL Breath Sounds: diminished  RUL Breath Sounds: diminished  RML Breath Sounds: diminished  RLL Breath Sounds: diminished  O2 Device/Concentration: room air  Surgical airway: Yes, Type: Shiley Size: 8, cuffed  Ambu at bedside: Ambu bag with the patient?: Yes, Adult Ambu     Active Orders   Respiratory Care    Pulse Oximetry Q4H     Frequency: Q4H     Number of Occurrences: Until Specified    Routine tracheostomy care     Frequency: BID     Number of Occurrences: Until Specified       RECOMMENDATIONS    We recommend: RRT Recs: Continue POC per primary team.      FOLLOW-UP    Please call back the Rapid Response RT, Esvin Hall, RRT at x [phone] for any questions or concerns.

## 2022-10-01 NOTE — ASSESSMENT & PLAN NOTE
Tim Richards is a 56 y.o. 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 Vascular surgery has been consulted for evaluation of right groin site concerning for pseudoaneurym. Patient is now s/p Rt groin exploration, creation of Right Ileofemoral bypass, and creation of muscle flap 9/27/22    Recommend:  - Infectious disease on the case  - ABx: daptomycin, meropenem  -  for PT/OT and IV abx  - Anticoagulation per primary team  - WV and CLEO drainage to be taken care of by plastic surgery team  - F/u appointment in 4 weeks with SADAF/TBI.  - Ok from vascular perspective to discharge once primary team issues are addressed.

## 2022-10-01 NOTE — PLAN OF CARE
Pt educated on fall risk,pt remained free from falls/trauma/injury. LVAD numbers WNL. Doppler 96/0 @ 9pm. MD notified. Rechecked after 1 hr. Drdown to 74/0 without any interventions. CLEO drain present. Wound Vac on 125 mm hg. Tracheostomy tube present. Plan of care reviewed with pt, all questions answered. Bed locked in lowest position, call bell within reach, no acute distress noted, will continue to monitor.

## 2022-10-01 NOTE — PROGRESS NOTES
[facility] - [facility]iology Step[facility]  Heart Transplant  Progress Note    Patient Name: Tim Richards  MRN: [id number]  Admission Date: 9/24/2022  Hospital Length of Stay: 7 days  Attending Physician: Antonio Hadley Jr.,*  Primary Care Provider: Deyanira Booth MD  Principal Problem:Pseudoaneurysm of right femoral artery    Subjective:     **Interval History: Post op day 4. Doing well without any evidence of bleeding. Pain under control. Hasn't had a BM postoperatively and reports little gas; Lactulose added yesterday but he only took one dose.  Will continue and add suppository today.   INR is 1.1; will give 5mg today and monitor trend    Dispo pending therapeutic INR and removal of trach with Dr. Holt    Continuous Infusions:   heparin (porcine) in D5W 12 Units/kg/hr (10/01/22 0901)     Scheduled Meds:   acetaminophen  1,000 mg Oral TID    amiodarone  400 mg Oral Daily    ertapenem (INVANZ) IVPB  1 g Intravenous Q24H    lactulose  15 g Oral TID    levothyroxine  50 mcg Oral Before breakfast    magnesium oxide  400 mg Oral Daily with lunch    mexiletine  250 mg Oral Q8H    mirtazapine  30 mg Oral QHS    pantoprazole  40 mg Oral Daily with lunch    polyethylene glycol  17 g Oral Daily    sodium chloride 0.9%  10 mL Intravenous Q6H    warfarin  5 mg Oral Daily     PRN Meds:sodium chloride, sodium chloride, sodium chloride 0.9%, sodium chloride 0.9%, ALPRAZolam, bisacodyL, oxyCODONE, oxyCODONE, Flushing PICC Protocol **AND** sodium chloride 0.9% **AND** sodium chloride 0.9%, zolpidem    Review of patient's allergies indicates:   Allergen Reactions    Lisinopril Anaphylaxis    Hydralazine analogues      Chronic constipation, impotence, dizziness     Objective:     Vital Signs (Most Recent):  Temp: 98 °F (36.7 °C) (10/01/22 0500)  Pulse: 82 (10/01/22 1038)  Resp: 17 (10/01/22 0850)  BP: 105/67 (10/01/22 0500)  SpO2: 97 % (10/01/22 0850)   Vital Signs (24h Range):  Temp:  [98 °F (36.7 °C)-98.7 °F (37.1  °C)] 98 °F (36.7 °C)  Pulse:  [72-86] 82  Resp:  [16-18] 17  SpO2:  [94 %-99 %] 97 %  BP: ()/(0-77) 105/67     Patient Vitals for the past 72 hrs (Last 3 readings):   Weight   10/01/22 0524 85.8 kg (189 lb 2.5 oz)   09/30/22 0528 86 kg (189 lb 9.5 oz)   09/29/22 0529 82.2 kg (181 lb 3.5 oz)       Body mass index is 24.96 kg/m².      Intake/Output Summary (Last 24 hours) at 10/1/2022 1050  Last data filed at 10/1/2022 0524  Gross per 24 hour   Intake 150 ml   Output 504 ml   Net -354 ml         Hemodynamic Parameters:       Telemetry: SR    Physical Exam  Constitutional:       Appearance: Normal appearance.   HENT:      Head: Normocephalic and atraumatic.   Eyes:      Extraocular Movements: Extraocular movements intact.      Pupils: Pupils are equal, round, and reactive to light.   Neck:      Comments: Neck veins are not elevated  Cardiovascular:      Rate and Rhythm: Normal rate and regular rhythm.      Comments: Smooth VAD hum  Pulmonary:      Effort: Pulmonary effort is normal.      Breath sounds: Normal breath sounds.   Abdominal:      General: Bowel sounds are normal. There is no distension.      Palpations: Abdomen is soft.      Tenderness: There is no abdominal tenderness.      Comments: DLES dressing c/d/i   Musculoskeletal:         General: No swelling. Normal range of motion.      Cervical back: Normal range of motion and neck supple.      Right lower leg: No edema.      Left lower leg: No edema.      Comments: R groin with wound vac and CLEO drain. No drainage or bleeding.    Skin:     General: Skin is warm and dry.      Capillary Refill: Capillary refill takes 2 to 3 seconds.   Neurological:      General: No focal deficit present.      Mental Status: He is alert and oriented to person, place, and time.   Psychiatric:         Mood and Affect: Mood normal.         Behavior: Behavior normal.         Thought Content: Thought content normal.         Judgment: Judgment normal.       Significant  Labs:  CBC:  Recent Labs   Lab 09/30/22  1040 10/01/22  0507 10/01/22  0906   WBC 10.21  10.21 10.35 9.78   RBC 3.16*  3.16* 3.09* 3.10*   HGB 8.6*  8.6* 8.5* 8.5*   HCT 29.9*  29.9* 28.2* 28.3*     221 254 241   MCV 95  95 91 91   MCH 27.2  27.2 27.5 27.4   MCHC 28.8*  28.8* 30.1* 30.0*       BNP:  Recent Labs   Lab 09/26/22  0353 09/28/22  0332 09/30/22  0546   BNP 97 787* 179*       CMP:  Recent Labs   Lab 09/29/22  0451 09/30/22  0546 10/01/22  0507   GLU 62* 76 70   CALCIUM 9.0 8.8 8.6*   ALBUMIN 2.2* 2.0* 1.9*   PROT 6.0 5.8* 5.5*   * 136 134*   K 4.0 4.0 3.6   CO2 24 25 25    104 105   BUN 14 13 12   CREATININE 1.2 1.2 0.9   ALKPHOS 87 85 76   ALT 17 17 11   AST 27 24 20   BILITOT 0.6 0.5 0.4        Coagulation:   Recent Labs   Lab 09/29/22  0451 09/29/22  0740 09/29/22  2129 09/30/22  0546 10/01/22  0507   INR 1.1  --   --  1.1 1.1   APTT 32.4*   < > 39.0* 40.7* 42.2*    < > = values in this interval not displayed.       LDH:  Recent Labs   Lab 09/29/22  0451 09/30/22  0546 10/01/22  0507   * 273* 241       Microbiology:  Microbiology Results (last 7 days)       Procedure Component Value Units Date/Time    Aerobic culture [[id number]]  (Abnormal)  (Susceptibility) Collected: 09/27/22 0944    Order Status: Completed Specimen: Wound from Groin Updated: 09/30/22 1145     Aerobic Bacterial Culture ESCHERICHIA COLI ESBL  Moderate      Narrative:      1. RIGHT GROIN WOUND    Blood culture [[id number]] Collected: 09/25/22 0138    Order Status: Completed Specimen: Blood from Peripheral, Hand, Right Updated: 09/30/22 0612     Blood Culture, Routine No growth after 5 days.    Narrative:      Collection has been rescheduled by TR3 at 09/25/2022 00:31 Reason: Pt   dont wanna get stuck beni tabor  Collection has been rescheduled by TR3 at 09/25/2022 00:31 Reason: Pt   dont wanna get stuck beni tabor    Blood culture [[id number]] Collected: 09/25/22 0133    Order Status: Completed  Specimen: Blood from Peripheral, Lower Arm, Left Updated: 09/30/22 0612     Blood Culture, Routine No growth after 5 days.    Narrative:      Collection has been rescheduled by TR3 at 09/25/2022 00:31 Reason: Pt   dont wanna get stuck rn sharona  Collection has been rescheduled by TR3 at 09/25/2022 00:31 Reason: Pt   dont wanna get stuck rn sharona    Aerobic culture [[id number]]  (Abnormal)  (Susceptibility) Collected: 09/27/22 0944    Order Status: Completed Specimen: Wound from Groin Updated: 09/29/22 1300     Aerobic Bacterial Culture ESCHERICHIA COLI  Rare      Narrative:      4. FEMORAL TISSUE RIGHT GROIN    Aerobic culture [[id number]]  (Abnormal)  (Susceptibility) Collected: 09/27/22 0944    Order Status: Completed Specimen: Wound from Groin Updated: 09/29/22 1259     Aerobic Bacterial Culture ESCHERICHIA COLI ESBL  Rare      Narrative:      3. RIGHT FEMORAL GRAFT    Aerobic culture [[id number]]  (Abnormal)  (Susceptibility) Collected: 09/27/22 0944    Order Status: Completed Specimen: Wound from Groin Updated: 09/29/22 1248     Aerobic Bacterial Culture CITROBACTER FARMERI  Rare      Narrative:      2. RIGHT FEMORAL ARTERY    Culture, Anaerobe [[id number]] Collected: 09/27/22 0944    Order Status: Completed Specimen: Wound from Groin Updated: 09/29/22 1059     Anaerobic Culture Culture in progress    Narrative:      4. FEMORAL TISSUE RIGHT GROIN    Culture, Anaerobe [[id number]] Collected: 09/27/22 0944    Order Status: Completed Specimen: Wound from Groin Updated: 09/29/22 1058     Anaerobic Culture Culture in progress    Narrative:      3. RIGHT FEMORAL GRAFT    Culture, Anaerobe [[id number]] Collected: 09/27/22 0944    Order Status: Completed Specimen: Wound from Groin Updated: 09/29/22 1057     Anaerobic Culture Culture in progress    Narrative:      2. RIGHT FEMORAL ARTERY    Culture, Anaerobe [[id number]] Collected: 09/27/22 0944    Order Status: Completed Specimen: Wound from Groin Updated: 09/29/22 1056      Anaerobic Culture Culture in progress    Narrative:      1. RIGHT GROIN WOUND    Culture, Anaerobe [[id number]] Collected: 09/25/22 0815    Order Status: Completed Specimen: Wound from Groin Updated: 09/29/22 0932     Anaerobic Culture No anaerobes isolated    AFB Culture & Smear [[id number]] Collected: 09/27/22 0944    Order Status: Completed Specimen: Wound from Groin Updated: 09/29/22 0927     AFB Culture & Smear Culture in progress     AFB CULTURE STAIN No acid fast bacilli seen.    Narrative:      1. RIGHT GROIN WOUND    AFB Culture & Smear [[id number]] Collected: 09/27/22 0944    Order Status: Completed Specimen: Wound from Groin Updated: 09/28/22 2127     AFB Culture & Smear Culture in progress     AFB CULTURE STAIN No acid fast bacilli seen.    Narrative:      2. RIGHT FEMORAL ARTERY    AFB Culture & Smear [[id number]] Collected: 09/27/22 0944    Order Status: Completed Specimen: Wound from Groin Updated: 09/28/22 2127     AFB Culture & Smear Culture in progress     AFB CULTURE STAIN No acid fast bacilli seen.    Narrative:      3. RIGHT FEMORAL GRAFT    AFB Culture & Smear [[id number]] Collected: 09/27/22 0944    Order Status: Completed Specimen: Wound from Groin Updated: 09/28/22 2127     AFB Culture & Smear Culture in progress     AFB CULTURE STAIN No acid fast bacilli seen.    Narrative:      4. FEMORAL TISSUE RIGHT GROIN    Gram stain [[id number]] Collected: 09/27/22 0944    Order Status: Completed Specimen: Wound from Groin Updated: 09/27/22 1312     Gram Stain Result Few WBC's      No organisms seen    Narrative:      1. RIGHT GROIN WOUND    Gram stain [[id number]] Collected: 09/27/22 0944    Order Status: Completed Specimen: Wound from Groin Updated: 09/27/22 1204     Gram Stain Result No WBC's      No organisms seen    Narrative:      2. RIGHT FEMORAL ARTERY    Gram stain [[id number]] Collected: 09/27/22 0944    Order Status: Completed Specimen: Wound from Groin Updated: 09/27/22 1203     Gram Stain  Result No WBC's      No organisms seen    Narrative:      3. RIGHT FEMORAL GRAFT    Gram stain [[id number]] Collected: 09/27/22 0944    Order Status: Completed Specimen: Wound from Groin Updated: 09/27/22 1202     Gram Stain Result No WBC's      No organisms seen    Narrative:      4. FEMORAL TISSUE RIGHT GROIN    Fungus culture [[id number]] Collected: 09/27/22 0944    Order Status: Sent Specimen: Wound from Groin Updated: 09/27/22 1036    Fungus culture [[id number]] Collected: 09/27/22 0944    Order Status: Sent Specimen: Wound from Groin Updated: 09/27/22 1034    Fungus culture [[id number]] Collected: 09/27/22 0944    Order Status: Sent Specimen: Wound from Groin Updated: 09/27/22 1032    Fungus culture [[id number]] Collected: 09/27/22 0944    Order Status: Sent Specimen: Wound from Groin Updated: 09/27/22 1029    Aerobic culture [[id number]]  (Abnormal)  (Susceptibility) Collected: 09/25/22 0815    Order Status: Completed Specimen: Wound from Groin Updated: 09/27/22 0945     Aerobic Bacterial Culture ESCHERICHIA COLI ESBL  Few      Fungus culture [[id number]] Collected: 09/25/22 0815    Order Status: Completed Specimen: Wound from Groin Updated: 09/26/22 1150     Fungus (Mycology) Culture Culture in progress    Blood culture [[id number]]     Order Status: Canceled Specimen: Blood     Blood culture [[id number]]     Order Status: Canceled Specimen: Blood             I have reviewed all pertinent labs within the past 24 hours.    Estimated Creatinine Clearance: 104.8 mL/min (based on SCr of 0.9 mg/dL).    Diagnostic Results:  I have reviewed and interpreted all pertinent imaging results/findings within the past 24 hours.    Assessment and Plan:     Tim Richards is a 56 y.o.  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  His post operative course was complicated by cardiogenic shock/RV failure requiring VA-ECMO.  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  He has a 8 Fr cuffed Shiley trach.  He had a prolonged and complicated hospital course from 6/24/22 - 9/4/22, was discharged to rehab (see clinic visit from 9/22/22 for additional details).  Of note he was last seen in clinic by Dr. Ragsdale on 9/22/22 after being discharged from rehab and at the time was noted to have purulence from the groin site and was placed on doxycycline PO.  He has had complaints of fatigue since his last hospitalization, per his wife.  This morning he was sitting and was crossing his legs for a prolonged period of time, and when he uncrossed them he noticed a pool of blood under him and active bleeding from his R groin where his previous ECMO cannula was.  He began to feel lightheaded when he tried walking.  Per wife he had 2 low flow alarms at home.      In the ED, patient stated he felt at his baseline and had no complaints.  No active bleeding at the time of my evaluation from R groin site.  Doppler BP was 80/0.  Hgb dropped from 9.4 -> 8.9 over the past 2 days.  INR was 3.0.  CT Angio performed per CV surgery recommendations.  HTS was consulted for admission, CT surgery was consulted in the ED for evaluation of groin hematoma.       Cardiovascular Studies  TTE 8/30/22   There is an LVAD present. Base speed is 6300 RPMs. The pump type is a Heartmate III. The interventricular septum appears midline. The aortic valve does not open.   The left ventricle is severely enlarged with eccentric hypertrophy and severely decreased systolic function.   The estimated ejection fraction is 10%.   There is left ventricular global hypokinesis.   Left ventricular diastolic dysfunction.   There is trivial continuous aortic regurgitation.   There is abnormal septal wall motion.   The right ventricle is poorly seen, but appears enlarged with reduced  systolic function.   Mild tricuspid regurgitation.      * Pseudoaneurysm of right femoral artery  -Vascular surgery consulted, appreciate recommendations  -CTA shows 3 new pseudoaneurysms of R femoral artery  -S/p  Right Ileofemoral bypass 9/27/22 (end to end). Distal External Iliac to distal femoral artery  -Wound vac in place     LVAD (left ventricular assist device) present  -S/P S/P DT HM2 3/8/2018 then S/P DT HM3 exchange 7/13/2022 2/2 to thrombosis of HM2 thought caused by COVID PNA  -Current speed 6400  -Last TTE done 8/30/2022 at 6300: LVEDD 7.44, LVEF 10%, RV appears enlarged and decreased, trace AI, trace MR, mild TR, PAS > 28, IVC 3, AV does not open, septum midline  -restarted coumadin post op and minimally intense heparin gtt. Goal INR 2.0-3.0.  Subtherapeutic today.  Previous home dose was 5mg Qdaily   -LDH is stable    Procedure: Device Interrogation Including analysis of device parameters  Current Settings: Ventricular Assist Device  Review of device function is stable    TXP LVAD INTERROGATIONS 9/30/2022 9/30/2022 9/30/2022 9/30/2022 9/29/2022 9/29/2022 9/29/2022   Type HeartMate3 HeartMate3 HeartMate3 HeartMate3 HeartMate3 HeartMate3 HeartMate3   Flow 5.4 5.5 5.5 5.4 5.6 5.6 5.6   Speed 6400 6400 6400 6400 6400 6400 6400   PI 1.8 2.0 1.5 2.1 1.8 1.6 1.9   Power ([place]) 5.5 5.7 5.5 5.6 5.5 5.6 5.5   LSL 6000 6000 6000 6000 6000 6000 6000   Low Flow Alarm - - - - - - -   High Power Alarm - - - - - - -   Pulsatility Intermittent pulse Intermittent pulse Intermittent pulse Intermittent pulse - - -         Groin hematoma  -Was on VA-ECMO at last hospitalization with successful decannulation  -CTA revealed 3 possible new pseudoaneurysms  -INR 3 on admission, since reversed with PO vit K and FFP.  Holding A/C until further instruction from surgical team regarding resumption of A/C.   -Hgb was trending down since admit. Transfusing to keep Hgb > 10  -Cultures from right groin wound with ESBL E Coli, per  ID hold dapto and continue on ertapenem for total of 6 weeks.     Chronic combined systolic and diastolic congestive heart failure  -NICM   -Echo 8/30/22 EF: 10%, LVEDD: 7.44  -Patient was on Torsemide prior to admit: 40mg Qdaily   -GDMT: N/A  -Euvolemic on exam,   -Maintain K>4.0, Mg>2.0    VT (ventricular tachycardia)  -Patient has [place] [employer] SICD  -Currently on amiodarone 400mg qd and mexiletine 250mg TID, will continue     Hypertension  -BP controlled        YANET Coleman  Heart Transplant  [facility] - [facility]

## 2022-10-01 NOTE — SUBJECTIVE & OBJECTIVE
Medications:  Continuous Infusions:   heparin (porcine) in D5W 12 Units/kg/hr (09/30/22 0709)     Scheduled Meds:   acetaminophen  1,000 mg Oral TID    amiodarone  400 mg Oral Daily    ertapenem (INVANZ) IVPB  1 g Intravenous Q24H    lactulose  15 g Oral TID    levothyroxine  50 mcg Oral Before breakfast    magnesium oxide  400 mg Oral Daily with lunch    mexiletine  250 mg Oral Q8H    mirtazapine  30 mg Oral QHS    pantoprazole  40 mg Oral Daily with lunch    polyethylene glycol  17 g Oral Daily    sodium chloride 0.9%  10 mL Intravenous Q6H    warfarin  5 mg Oral Daily     PRN Meds:sodium chloride, sodium chloride, sodium chloride 0.9%, sodium chloride 0.9%, ALPRAZolam, oxyCODONE, oxyCODONE, Flushing PICC Protocol **AND** sodium chloride 0.9% **AND** sodium chloride 0.9%, zolpidem     Objective:     Vital Signs (Most Recent):  Temp: 98 °F (36.7 °C) (10/01/22 0500)  Pulse: 84 (10/01/22 0744)  Resp: 18 (10/01/22 0744)  BP: 105/67 (10/01/22 0500)  SpO2: 96 % (10/01/22 0744) Vital Signs (24h Range):  Temp:  [98 °F (36.7 °C)-98.9 °F (37.2 °C)] 98 °F (36.7 °C)  Pulse:  [70-94] 84  Resp:  [16-18] 18  SpO2:  [94 %-99 %] 96 %  BP: ()/(0-80) 105/67         Physical Exam  Constitutional:       Appearance: Normal appearance.   HENT:      Head: Normocephalic and atraumatic.      Mouth/Throat:      Mouth: Mucous membranes are moist.   Eyes:      Pupils: Pupils are equal, round, and reactive to light.   Cardiovascular:      Rate and Rhythm: Regular rhythm.   Abdominal:      General: Abdomen is flat.      Palpations: Abdomen is soft.   Musculoskeletal:      Comments: 5/5 strength all 4 extremities, biphasic waveforms DP bilaterally  Right groin area with wound vac and CLEO drain   Neurological:      Mental Status: He is alert.       Significant Labs:  ABGs: No results for input(s): PH, PCO2, PO2, HCO3, POCSATURATED, BE in the last 48 hours.  Cardiac markers: No results for input(s): CKMB, CPKMB, TROPONINT, TROPONINI,  MYOGLOBIN in the last 48 hours.  CBC:   Recent Labs   Lab 10/01/22  0507   WBC 10.35   RBC 3.09*   HGB 8.5*   HCT 28.2*      MCV 91   MCH 27.5   MCHC 30.1*     CMP:   Recent Labs   Lab 10/01/22  0507   GLU 70   CALCIUM 8.6*   ALBUMIN 1.9*   PROT 5.5*   *   K 3.6   CO2 25      BUN 12   CREATININE 0.9   ALKPHOS 76   ALT 11   AST 20   BILITOT 0.4     Coagulation:   Recent Labs   Lab 10/01/22  0507   LABPROT 11.3   INR 1.1   APTT 42.2*     Lactic Acid: No results for input(s): LACTATE in the last 48 hours.  All pertinent labs from the last 24 hours have been reviewed.    Significant Diagnostics:  I have reviewed all pertinent imaging results/findings within the past 24 hours.

## 2022-10-02 LAB
ALBUMIN SERPL BCP-MCNC: 1.9 G/DL (ref 3.5–5.2)
ALP SERPL-CCNC: 83 U/L (ref 55–135)
ALT SERPL W/O P-5'-P-CCNC: 20 U/L (ref 10–44)
ANION GAP SERPL CALC-SCNC: 6 MMOL/L (ref 8–16)
APTT BLDCRRT: 44.2 SEC (ref 21–32)
AST SERPL-CCNC: 38 U/L (ref 10–40)
BACTERIA SPEC AEROBE CULT: ABNORMAL
BACTERIA SPEC AEROBE CULT: ABNORMAL
BASOPHILS # BLD AUTO: 0.03 K/UL (ref 0–0.2)
BASOPHILS # BLD AUTO: 0.04 K/UL (ref 0–0.2)
BASOPHILS NFR BLD: 0.2 % (ref 0–1.9)
BASOPHILS NFR BLD: 0.4 % (ref 0–1.9)
BILIRUB DIRECT SERPL-MCNC: 0.3 MG/DL (ref 0.1–0.3)
BILIRUB SERPL-MCNC: 0.4 MG/DL (ref 0.1–1)
BUN SERPL-MCNC: 12 MG/DL (ref 6–20)
CALCIUM SERPL-MCNC: 8.6 MG/DL (ref 8.7–10.5)
CHLORIDE SERPL-SCNC: 105 MMOL/L (ref 95–110)
CO2 SERPL-SCNC: 24 MMOL/L (ref 23–29)
CREAT SERPL-MCNC: 1.3 MG/DL (ref 0.5–1.4)
DIFFERENTIAL METHOD: ABNORMAL
DIFFERENTIAL METHOD: ABNORMAL
EOSINOPHIL # BLD AUTO: 0.2 K/UL (ref 0–0.5)
EOSINOPHIL # BLD AUTO: 0.6 K/UL (ref 0–0.5)
EOSINOPHIL NFR BLD: 1.5 % (ref 0–8)
EOSINOPHIL NFR BLD: 6.7 % (ref 0–8)
ERYTHROCYTE [DISTWIDTH] IN BLOOD BY AUTOMATED COUNT: 14.7 % (ref 11.5–14.5)
ERYTHROCYTE [DISTWIDTH] IN BLOOD BY AUTOMATED COUNT: 14.8 % (ref 11.5–14.5)
EST. GFR  (NO RACE VARIABLE): >60 ML/MIN/1.73 M^2
GLUCOSE SERPL-MCNC: 87 MG/DL (ref 70–110)
HCT VFR BLD AUTO: 26.3 % (ref 40–54)
HCT VFR BLD AUTO: 26.5 % (ref 40–54)
HGB BLD-MCNC: 7.7 G/DL (ref 14–18)
HGB BLD-MCNC: 7.8 G/DL (ref 14–18)
IMM GRANULOCYTES # BLD AUTO: 0.09 K/UL (ref 0–0.04)
IMM GRANULOCYTES # BLD AUTO: 0.14 K/UL (ref 0–0.04)
IMM GRANULOCYTES NFR BLD AUTO: 1 % (ref 0–0.5)
IMM GRANULOCYTES NFR BLD AUTO: 1.1 % (ref 0–0.5)
INR PPP: 1.3 (ref 0.8–1.2)
LDH SERPL L TO P-CCNC: 282 U/L (ref 110–260)
LYMPHOCYTES # BLD AUTO: 0.9 K/UL (ref 1–4.8)
LYMPHOCYTES # BLD AUTO: 1.2 K/UL (ref 1–4.8)
LYMPHOCYTES NFR BLD: 12.7 % (ref 18–48)
LYMPHOCYTES NFR BLD: 6.8 % (ref 18–48)
MAGNESIUM SERPL-MCNC: 2.1 MG/DL (ref 1.6–2.6)
MCH RBC QN AUTO: 26.8 PG (ref 27–31)
MCH RBC QN AUTO: 27 PG (ref 27–31)
MCHC RBC AUTO-ENTMCNC: 29.1 G/DL (ref 32–36)
MCHC RBC AUTO-ENTMCNC: 29.7 G/DL (ref 32–36)
MCV RBC AUTO: 90 FL (ref 82–98)
MCV RBC AUTO: 93 FL (ref 82–98)
MONOCYTES # BLD AUTO: 0.9 K/UL (ref 0.3–1)
MONOCYTES # BLD AUTO: 0.9 K/UL (ref 0.3–1)
MONOCYTES NFR BLD: 6.8 % (ref 4–15)
MONOCYTES NFR BLD: 9.4 % (ref 4–15)
NEUTROPHILS # BLD AUTO: 10.8 K/UL (ref 1.8–7.7)
NEUTROPHILS # BLD AUTO: 6.5 K/UL (ref 1.8–7.7)
NEUTROPHILS NFR BLD: 69.8 % (ref 38–73)
NEUTROPHILS NFR BLD: 83.6 % (ref 38–73)
NRBC BLD-RTO: 0 /100 WBC
NRBC BLD-RTO: 0 /100 WBC
PHOSPHATE SERPL-MCNC: 2.7 MG/DL (ref 2.7–4.5)
PLATELET # BLD AUTO: 261 K/UL (ref 150–450)
PLATELET # BLD AUTO: 271 K/UL (ref 150–450)
PMV BLD AUTO: 10.6 FL (ref 9.2–12.9)
PMV BLD AUTO: 9.6 FL (ref 9.2–12.9)
POTASSIUM SERPL-SCNC: 4.1 MMOL/L (ref 3.5–5.1)
PROT SERPL-MCNC: 5.6 G/DL (ref 6–8.4)
PROTHROMBIN TIME: 13.4 SEC (ref 9–12.5)
RBC # BLD AUTO: 2.85 M/UL (ref 4.6–6.2)
RBC # BLD AUTO: 2.91 M/UL (ref 4.6–6.2)
SODIUM SERPL-SCNC: 135 MMOL/L (ref 136–145)
WBC # BLD AUTO: 12.95 K/UL (ref 3.9–12.7)
WBC # BLD AUTO: 9.29 K/UL (ref 3.9–12.7)

## 2022-10-02 PROCEDURE — 93750 PR INTERROGATE VENT ASSIST DEV, IN PERSON, W PHYSICIAN ANALYSIS: ICD-10-PCS | Mod: ,,, | Performed by: INTERNAL MEDICINE

## 2022-10-02 PROCEDURE — 83615 LACTATE (LD) (LDH) ENZYME: CPT | Performed by: NURSE PRACTITIONER

## 2022-10-02 PROCEDURE — 25000003 PHARM REV CODE 250: Performed by: STUDENT IN AN ORGANIZED HEALTH CARE EDUCATION/TRAINING PROGRAM

## 2022-10-02 PROCEDURE — 99900035 HC TECH TIME PER 15 MIN (STAT)

## 2022-10-02 PROCEDURE — 25000003 PHARM REV CODE 250: Performed by: INTERNAL MEDICINE

## 2022-10-02 PROCEDURE — 85610 PROTHROMBIN TIME: CPT | Performed by: STUDENT IN AN ORGANIZED HEALTH CARE EDUCATION/TRAINING PROGRAM

## 2022-10-02 PROCEDURE — 94761 N-INVAS EAR/PLS OXIMETRY MLT: CPT

## 2022-10-02 PROCEDURE — 99233 PR SUBSEQUENT HOSPITAL CARE,LEVL III: ICD-10-PCS | Mod: ,,, | Performed by: PHYSICIAN ASSISTANT

## 2022-10-02 PROCEDURE — 85025 COMPLETE CBC W/AUTO DIFF WBC: CPT | Mod: 91 | Performed by: NURSE PRACTITIONER

## 2022-10-02 PROCEDURE — 83735 ASSAY OF MAGNESIUM: CPT | Performed by: NURSE PRACTITIONER

## 2022-10-02 PROCEDURE — 25000003 PHARM REV CODE 250: Performed by: NURSE PRACTITIONER

## 2022-10-02 PROCEDURE — 63600175 PHARM REV CODE 636 W HCPCS: Performed by: STUDENT IN AN ORGANIZED HEALTH CARE EDUCATION/TRAINING PROGRAM

## 2022-10-02 PROCEDURE — 27000207 HC ISOLATION

## 2022-10-02 PROCEDURE — 63600175 PHARM REV CODE 636 W HCPCS: Performed by: PHYSICIAN ASSISTANT

## 2022-10-02 PROCEDURE — 80048 BASIC METABOLIC PNL TOTAL CA: CPT | Performed by: NURSE PRACTITIONER

## 2022-10-02 PROCEDURE — A4216 STERILE WATER/SALINE, 10 ML: HCPCS | Performed by: INTERNAL MEDICINE

## 2022-10-02 PROCEDURE — 99233 SBSQ HOSP IP/OBS HIGH 50: CPT | Mod: ,,, | Performed by: PHYSICIAN ASSISTANT

## 2022-10-02 PROCEDURE — 80076 HEPATIC FUNCTION PANEL: CPT | Performed by: NURSE PRACTITIONER

## 2022-10-02 PROCEDURE — 84100 ASSAY OF PHOSPHORUS: CPT | Performed by: PHYSICIAN ASSISTANT

## 2022-10-02 PROCEDURE — 85730 THROMBOPLASTIN TIME PARTIAL: CPT | Performed by: PHYSICIAN ASSISTANT

## 2022-10-02 PROCEDURE — 20600001 HC STEP DOWN PRIVATE ROOM

## 2022-10-02 PROCEDURE — 27000248 HC VAD-ADDITIONAL DAY

## 2022-10-02 PROCEDURE — 93750 INTERROGATION VAD IN PERSON: CPT | Mod: ,,, | Performed by: INTERNAL MEDICINE

## 2022-10-02 RX ADMIN — HEPARIN SODIUM 12 UNITS/KG/HR: 10000 INJECTION, SOLUTION INTRAVENOUS at 08:10

## 2022-10-02 RX ADMIN — ACETAMINOPHEN 1000 MG: 500 TABLET ORAL at 09:10

## 2022-10-02 RX ADMIN — Medication 10 ML: at 12:10

## 2022-10-02 RX ADMIN — Medication 400 MG: at 12:10

## 2022-10-02 RX ADMIN — WARFARIN SODIUM 5 MG: 5 TABLET ORAL at 04:10

## 2022-10-02 RX ADMIN — MIRTAZAPINE 30 MG: 30 TABLET, FILM COATED ORAL at 09:10

## 2022-10-02 RX ADMIN — AMIODARONE HYDROCHLORIDE 400 MG: 200 TABLET ORAL at 09:10

## 2022-10-02 RX ADMIN — MEXILETINE HYDROCHLORIDE 250 MG: 250 CAPSULE ORAL at 09:10

## 2022-10-02 RX ADMIN — ACETAMINOPHEN 1000 MG: 500 TABLET ORAL at 04:10

## 2022-10-02 RX ADMIN — MEXILETINE HYDROCHLORIDE 250 MG: 250 CAPSULE ORAL at 04:10

## 2022-10-02 RX ADMIN — LEVOTHYROXINE SODIUM 50 MCG: 50 TABLET ORAL at 06:10

## 2022-10-02 RX ADMIN — PANTOPRAZOLE SODIUM 40 MG: 40 TABLET, DELAYED RELEASE ORAL at 12:10

## 2022-10-02 RX ADMIN — MEXILETINE HYDROCHLORIDE 250 MG: 250 CAPSULE ORAL at 06:10

## 2022-10-02 RX ADMIN — ALPRAZOLAM 1 MG: 0.5 TABLET ORAL at 09:10

## 2022-10-02 RX ADMIN — Medication 10 ML: at 06:10

## 2022-10-02 RX ADMIN — ERTAPENEM 1 G: 1 INJECTION INTRAMUSCULAR; INTRAVENOUS at 12:10

## 2022-10-02 NOTE — ASSESSMENT & PLAN NOTE
Tim Richards is a 56 y.o. 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 Vascular surgery has been consulted for evaluation of right groin site concerning for pseudoaneurym. Patient is now s/p Rt groin exploration, creation of Right Ileofemoral bypass, and creation of muscle flap 9/27/22    Recommend:  - Infectious disease on the case  - ABx: meropenem  -  for PT/OT and IV abx  - Anticoagulation per primary team: coumadin, heparin ggt  - W[place] and CLEO drainage to be taken care of by plastic surgery team  - F/u appointment in 4 weeks with SADAF/TBI.

## 2022-10-02 NOTE — SUBJECTIVE & OBJECTIVE
Medications:  Continuous Infusions:   heparin (porcine) in D5W 12 Units/kg/hr (10/02/22 0836)     Scheduled Meds:   acetaminophen  1,000 mg Oral TID    amiodarone  400 mg Oral Daily    ertapenem (INVANZ) IVPB  1 g Intravenous Q24H    levothyroxine  50 mcg Oral Before breakfast    magnesium oxide  400 mg Oral Daily with lunch    mexiletine  250 mg Oral Q8H    mirtazapine  30 mg Oral QHS    pantoprazole  40 mg Oral Daily with lunch    polyethylene glycol  17 g Oral Daily    sodium chloride 0.9%  10 mL Intravenous Q6H    warfarin  5 mg Oral Daily     PRN Meds:sodium chloride, sodium chloride, sodium chloride 0.9%, sodium chloride 0.9%, ALPRAZolam, bisacodyL, ondansetron, oxyCODONE, oxyCODONE, Flushing PICC Protocol **AND** sodium chloride 0.9% **AND** sodium chloride 0.9%, zolpidem     Objective:     Vital Signs (Most Recent):  Temp: 97.9 °F (36.6 °C) (10/02/22 0700)  Pulse: 74 (10/02/22 0859)  Resp: 16 (10/02/22 0700)  BP: (!) 80/0 (10/02/22 0700)  SpO2: (!) 94 % (10/02/22 0859) Vital Signs (24h Range):  Temp:  [97.8 °F (36.6 °C)-98.1 °F (36.7 °C)] 97.9 °F (36.6 °C)  Pulse:  [73-96] 74  Resp:  [16-22] 16  SpO2:  [92 %-96 %] 94 %  BP: ()/(0-60) 80/0         Physical Exam  Constitutional:       Appearance: Normal appearance.   HENT:      Head: Normocephalic and atraumatic.      Mouth/Throat:      Mouth: Mucous membranes are moist.   Eyes:      Pupils: Pupils are equal, round, and reactive to light.   Cardiovascular:      Rate and Rhythm: Regular rhythm.   Abdominal:      General: Abdomen is flat.      Palpations: Abdomen is soft.   Musculoskeletal:      Comments: 5/5 strength all 4 extremities, biphasic waveforms DP bilaterally  Right groin area with wound vac and CLEO drain   Neurological:      Mental Status: He is alert.       Significant Labs:  ABGs: No results for input(s): PH, PCO2, PO2, HCO3, POCSATURATED, BE in the last 48 hours.  Cardiac markers: No results for input(s): CKMB, CPKMB, TROPONINT, TROPONINI,  MYOGLOBIN in the last 48 hours.  CBC:   Recent Labs   Lab 10/02/22  0454   WBC 12.95*   RBC 2.91*   HGB 7.8*   HCT 26.3*      MCV 90   MCH 26.8*   MCHC 29.7*       CMP:   Recent Labs   Lab 10/02/22  0454   GLU 87   CALCIUM 8.6*   ALBUMIN 1.9*   PROT 5.6*   *   K 4.1   CO2 24      BUN 12   CREATININE 1.3   ALKPHOS 83   ALT 20   AST 38   BILITOT 0.4       Coagulation:   Recent Labs   Lab 10/02/22  0454   LABPROT 13.4*   INR 1.3*   APTT 44.2*       Lactic Acid: No results for input(s): LACTATE in the last 48 hours.  All pertinent labs from the last 24 hours have been reviewed.    Significant Diagnostics:  I have reviewed all pertinent imaging results/findings within the past 24 hours.

## 2022-10-02 NOTE — RESPIRATORY THERAPY
RAPID RESPONSE RESPIRATORY THERAPY PROACTIVE NOTE           Time of visit: 801     Code Status: Full Code   : 1965  Bed: 315/315 A:   MRN: [id number]  Time spent at the bedside: < 15 min    SITUATION    Evaluated patient for: LDA Check     BACKGROUND    Patient has a past medical history of A-fib, Anticoagulant long-term use, Atrial flutter, CHF (congestive heart failure), Class 1 obesity due to excess calories with serious comorbidity and body mass index (BMI) of 31.0 to 31.9 in adult, Class 1 obesity due to excess calories with serious comorbidity and body mass index (BMI) of 32.0 to 32.9 in adult, Dilated cardiomyopathy, Disorder of kidney and ureter, Encounter for blood transfusion, Essential hypertension, Gout, HTN (hypertension), psychiatric care, ICD (implantable cardioverter-defibrillator) infection, Psychiatric problem, Thyroid disease, and Ventricular tachycardia (paroxysmal).  Clinically Significant Surgical Hx: tracheostomy    24 Hours Vitals Range:  Temp:  [97.8 °F (36.6 °C)-98.4 °F (36.9 °C)]   Pulse:  [70-96]   Resp:  [16-22]   BP: ()/(0-60)   SpO2:  [92 %-98 %]     Labs:    Recent Labs     22  0546 10/01/22  0507 10/02/22  0454    134* 135*   K 4.0 3.6 4.1    105 105   CO2 25 25 24   CREATININE 1.2 0.9 1.3   GLU 76 70 87   PHOS 2.3* 2.0* 2.7   MG 2.0 2.1 2.1        No results for input(s): PH, PCO2, PO2, HCO3, POCSATURATED, BE in the last 72 hours.    ASSESSMENT/INTERVENTIONS  Pt resting in bed, no need for respiratory intervention at time of visit. Trach capped, secure, and clean, all supplies at bedside.      Last VS   Temp: 98.4 °F (36.9 °C) (10/02 1242)  Pulse: 76 (10/02 1224)  Resp: 16 (10/02 1111)  BP: 84/0 (10/02 1242)  SpO2: 98 % (10/02 124)      Extra trachs at bedside: 8.0 & 6.0 Shiley Cuffed  Level of Consciousness: Level of Consciousness (AVPU): alert  Respiratory Effort: Respiratory Effort: Unlabored Expansion/Accessory Muscle Usage: Expansion/Accessory  Muscles/Retractions: no use of accessory muscles  All Lung Field Breath Sounds: All Lung Fields Breath Sounds: Anterior:, absent, diminished  SHERWIN Breath Sounds: clear, diminished  LLL Breath Sounds: diminished  RUL Breath Sounds: diminished  RML Breath Sounds: diminished  RLL Breath Sounds: diminished  O2 Device/Concentration: room air  Surgical airway: Yes, Type: Shiley Size: 8, cuffed  Ambu at bedside: Ambu bag with the patient?: Yes, Adult Ambu     Active Orders   Respiratory Care    Pulse Oximetry Q4H     Frequency: Q4H     Number of Occurrences: Until Specified    Routine tracheostomy care     Frequency: BID     Number of Occurrences: Until Specified       RECOMMENDATIONS    We recommend: RRT Recs: Continue POC per primary team.      FOLLOW-UP    Please call back the Rapid Response RT, Esvin Hall, RRT at x [phone] for any questions or concerns.

## 2022-10-02 NOTE — PROGRESS NOTES
[facility] - Cardiology Stepdown  Heart Transplant  Progress Note    Patient Name: Tim Richards  MRN: [id number]  Admission Date: 9/24/2022  Hospital Length of Stay: 8 days  Attending Physician: Antonio Hadley Jr.,*  Primary Care Provider: Deyanira Booth MD  Principal Problem:Pseudoaneurysm of right femoral artery    Subjective:     **Interval History: Post op day 5. Doing well without any evidence of bleeding. Pain under control. Given suppository yesterday and had BM.  Lactulose discontinued as it was giving him abdominal cramps.  His home dose of Torsemide is still on hold; will not resume yet as he looks euvolemic doesn't look like he needs it yet.     Dispo pending therapeutic INR and removal of trach with Dr. Holt    Continuous Infusions:   heparin (porcine) in D5W 12 Units/kg/hr (10/02/22 0836)     Scheduled Meds:   acetaminophen  1,000 mg Oral TID    amiodarone  400 mg Oral Daily    ertapenem (INVANZ) IVPB  1 g Intravenous Q24H    levothyroxine  50 mcg Oral Before breakfast    magnesium oxide  400 mg Oral Daily with lunch    mexiletine  250 mg Oral Q8H    mirtazapine  30 mg Oral QHS    pantoprazole  40 mg Oral Daily with lunch    polyethylene glycol  17 g Oral Daily    sodium chloride 0.9%  10 mL Intravenous Q6H    warfarin  5 mg Oral Daily     PRN Meds:sodium chloride, sodium chloride, sodium chloride 0.9%, sodium chloride 0.9%, ALPRAZolam, bisacodyL, ondansetron, oxyCODONE, oxyCODONE, Flushing PICC Protocol **AND** sodium chloride 0.9% **AND** sodium chloride 0.9%, zolpidem    Review of patient's allergies indicates:   Allergen Reactions    Lisinopril Anaphylaxis    Hydralazine analogues      Chronic constipation, impotence, dizziness     Objective:     Vital Signs (Most Recent):  Temp: 97.9 °F (36.6 °C) (10/02/22 0700)  Pulse: 74 (10/02/22 0859)  Resp: 16 (10/02/22 0700)  BP: (!) 80/0 (10/02/22 0700)  SpO2: (!) 94 % (10/02/22 0859)   Vital Signs (24h Range):  Temp:  [97.8 °F (36.6  °C)-98.1 °F (36.7 °C)] 97.9 °F (36.6 °C)  Pulse:  [73-96] 74  Resp:  [16-22] 16  SpO2:  [92 %-96 %] 94 %  BP: ()/(0-60) 80/0     Patient Vitals for the past 72 hrs (Last 3 readings):   Weight   10/02/22 0455 89.2 kg (196 lb 10.4 oz)   10/01/22 0524 85.8 kg (189 lb 2.5 oz)   09/30/22 0528 86 kg (189 lb 9.5 oz)       Body mass index is 25.94 kg/m².      Intake/Output Summary (Last 24 hours) at 10/2/2022 1003  Last data filed at 10/2/2022 0600  Gross per 24 hour   Intake 500 ml   Output 250 ml   Net 250 ml         Hemodynamic Parameters:       Telemetry: SR    Physical Exam  Constitutional:       Appearance: Normal appearance.   HENT:      Head: Normocephalic and atraumatic.   Eyes:      Extraocular Movements: Extraocular movements intact.      Pupils: Pupils are equal, round, and reactive to light.   Neck:      Comments: Neck veins are not elevated  Cardiovascular:      Rate and Rhythm: Normal rate and regular rhythm.      Comments: Smooth VAD hum  Pulmonary:      Effort: Pulmonary effort is normal.      Breath sounds: Normal breath sounds.   Abdominal:      General: Bowel sounds are normal. There is no distension.      Palpations: Abdomen is soft.      Tenderness: There is no abdominal tenderness.      Comments: DLES dressing c/d/i   Musculoskeletal:         General: No swelling. Normal range of motion.      Cervical back: Normal range of motion and neck supple.      Right lower leg: No edema.      Left lower leg: No edema.      Comments: R groin with wound vac and CLEO drain. No drainage or bleeding.    Skin:     General: Skin is warm and dry.      Capillary Refill: Capillary refill takes 2 to 3 seconds.   Neurological:      General: No focal deficit present.      Mental Status: He is alert and oriented to person, place, and time.   Psychiatric:         Mood and Affect: Mood normal.         Behavior: Behavior normal.         Thought Content: Thought content normal.         Judgment: Judgment normal.        Significant Labs:  CBC:  Recent Labs   Lab 10/01/22  0906 10/01/22  1642 10/02/22  0454   WBC 9.78 9.46 12.95*   RBC 3.10* 2.93* 2.91*   HGB 8.5* 8.1* 7.8*   HCT 28.3* 26.6* 26.3*    244 261   MCV 91 91 90   MCH 27.4 27.6 26.8*   MCHC 30.0* 30.5* 29.7*       BNP:  Recent Labs   Lab 09/26/22  0353 09/28/22  0332 09/30/22  0546   BNP 97 787* 179*       CMP:  Recent Labs   Lab 09/30/22  0546 10/01/22  0507 10/02/22  0454   GLU 76 70 87   CALCIUM 8.8 8.6* 8.6*   ALBUMIN 2.0* 1.9* 1.9*   PROT 5.8* 5.5* 5.6*    134* 135*   K 4.0 3.6 4.1   CO2 25 25 24    105 105   BUN 13 12 12   CREATININE 1.2 0.9 1.3   ALKPHOS 85 76 83   ALT 17 11 20   AST 24 20 38   BILITOT 0.5 0.4 0.4        Coagulation:   Recent Labs   Lab 09/30/22  0546 10/01/22  0507 10/02/22  0454   INR 1.1 1.1 1.3*   APTT 40.7* 42.2* 44.2*       LDH:  Recent Labs   Lab 09/30/22  0546 10/01/22  0507 10/02/22  0454   * 241 282*       Microbiology:  Microbiology Results (last 7 days)       Procedure Component Value Units Date/Time    Aerobic culture [[id number]]  (Abnormal)  (Susceptibility) Collected: 09/27/22 0944    Order Status: Completed Specimen: Wound from Groin Updated: 10/01/22 1345     Aerobic Bacterial Culture ESCHERICHIA COLI ESBL  Moderate      Narrative:      1. RIGHT GROIN WOUND    Blood culture [[id number]] Collected: 09/25/22 0138    Order Status: Completed Specimen: Blood from Peripheral, Hand, Right Updated: 09/30/22 0612     Blood Culture, Routine No growth after 5 days.    Narrative:      Collection has been rescheduled by TR3 at 09/25/2022 00:31 Reason: Pt   dont wanna get stuck beni tabor  Collection has been rescheduled by TR3 at 09/25/2022 00:31 Reason: Pt   dont wanna get stuck rn sharona    Blood culture [[id number]] Collected: 09/25/22 0133    Order Status: Completed Specimen: Blood from Peripheral, Lower Arm, Left Updated: 09/30/22 0612     Blood Culture, Routine No growth after 5 days.    Narrative:       Collection has been rescheduled by TR3 at 09/25/2022 00:31 Reason: Pt   dont wanna get stuck rn janiae  Collection has been rescheduled by TR3 at 09/25/2022 00:31 Reason: Pt   dont wanna get stuck rn bernardalle    Aerobic culture [[id number]]  (Abnormal)  (Susceptibility) Collected: 09/27/22 0944    Order Status: Completed Specimen: Wound from Groin Updated: 09/29/22 1300     Aerobic Bacterial Culture ESCHERICHIA COLI  Rare      Narrative:      4. FEMORAL TISSUE RIGHT GROIN    Aerobic culture [[id number]]  (Abnormal)  (Susceptibility) Collected: 09/27/22 0944    Order Status: Completed Specimen: Wound from Groin Updated: 09/29/22 1259     Aerobic Bacterial Culture ESCHERICHIA COLI ESBL  Rare      Narrative:      3. RIGHT FEMORAL GRAFT    Aerobic culture [[id number]]  (Abnormal)  (Susceptibility) Collected: 09/27/22 0944    Order Status: Completed Specimen: Wound from Groin Updated: 09/29/22 1248     Aerobic Bacterial Culture CITROBACTER FARMERI  Rare      Narrative:      2. RIGHT FEMORAL ARTERY    Culture, Anaerobe [[id number]] Collected: 09/27/22 0944    Order Status: Completed Specimen: Wound from Groin Updated: 09/29/22 1059     Anaerobic Culture Culture in progress    Narrative:      4. FEMORAL TISSUE RIGHT GROIN    Culture, Anaerobe [[id number]] Collected: 09/27/22 0944    Order Status: Completed Specimen: Wound from Groin Updated: 09/29/22 1058     Anaerobic Culture Culture in progress    Narrative:      3. RIGHT FEMORAL GRAFT    Culture, Anaerobe [[id number]] Collected: 09/27/22 0944    Order Status: Completed Specimen: Wound from Groin Updated: 09/29/22 1057     Anaerobic Culture Culture in progress    Narrative:      2. RIGHT FEMORAL ARTERY    Culture, Anaerobe [[id number]] Collected: 09/27/22 0944    Order Status: Completed Specimen: Wound from Groin Updated: 09/29/22 1056     Anaerobic Culture Culture in progress    Narrative:      1. RIGHT GROIN WOUND    Culture, Anaerobe [[id number]] Collected: 09/25/22 0815     Order Status: Completed Specimen: Wound from Groin Updated: 09/29/22 0932     Anaerobic Culture No anaerobes isolated    AFB Culture & Smear [[id number]] Collected: 09/27/22 0944    Order Status: Completed Specimen: Wound from Groin Updated: 09/29/22 0927     AFB Culture & Smear Culture in progress     AFB CULTURE STAIN No acid fast bacilli seen.    Narrative:      1. RIGHT GROIN WOUND    AFB Culture & Smear [[id number]] Collected: 09/27/22 0944    Order Status: Completed Specimen: Wound from Groin Updated: 09/28/22 2127     AFB Culture & Smear Culture in progress     AFB CULTURE STAIN No acid fast bacilli seen.    Narrative:      2. RIGHT FEMORAL ARTERY    AFB Culture & Smear [[id number]] Collected: 09/27/22 0944    Order Status: Completed Specimen: Wound from Groin Updated: 09/28/22 2127     AFB Culture & Smear Culture in progress     AFB CULTURE STAIN No acid fast bacilli seen.    Narrative:      3. RIGHT FEMORAL GRAFT    AFB Culture & Smear [[id number]] Collected: 09/27/22 0944    Order Status: Completed Specimen: Wound from Groin Updated: 09/28/22 2127     AFB Culture & Smear Culture in progress     AFB CULTURE STAIN No acid fast bacilli seen.    Narrative:      4. FEMORAL TISSUE RIGHT GROIN    Gram stain [[id number]] Collected: 09/27/22 0944    Order Status: Completed Specimen: Wound from Groin Updated: 09/27/22 1312     Gram Stain Result Few WBC's      No organisms seen    Narrative:      1. RIGHT GROIN WOUND    Gram stain [[id number]] Collected: 09/27/22 0944    Order Status: Completed Specimen: Wound from Groin Updated: 09/27/22 1204     Gram Stain Result No WBC's      No organisms seen    Narrative:      2. RIGHT FEMORAL ARTERY    Gram stain [[id number]] Collected: 09/27/22 0944    Order Status: Completed Specimen: Wound from Groin Updated: 09/27/22 1203     Gram Stain Result No WBC's      No organisms seen    Narrative:      3. RIGHT FEMORAL GRAFT    Gram stain [[id number]] Collected: 09/27/22 0944    Order  Status: Completed Specimen: Wound from Groin Updated: 09/27/22 1202     Gram Stain Result No WBC's      No organisms seen    Narrative:      4. FEMORAL TISSUE RIGHT GROIN    Fungus culture [[id number]] Collected: 09/27/22 0944    Order Status: Sent Specimen: Wound from Groin Updated: 09/27/22 1036    Fungus culture [[id number]] Collected: 09/27/22 0944    Order Status: Sent Specimen: Wound from Groin Updated: 09/27/22 1034    Fungus culture [[id number]] Collected: 09/27/22 0944    Order Status: Sent Specimen: Wound from Groin Updated: 09/27/22 1032    Fungus culture [[id number]] Collected: 09/27/22 0944    Order Status: Sent Specimen: Wound from Groin Updated: 09/27/22 1029    Aerobic culture [[id number]]  (Abnormal)  (Susceptibility) Collected: 09/25/22 0815    Order Status: Completed Specimen: Wound from Groin Updated: 09/27/22 0945     Aerobic Bacterial Culture ESCHERICHIA COLI ESBL  Few      Fungus culture [[id number]] Collected: 09/25/22 0815    Order Status: Completed Specimen: Wound from Groin Updated: 09/26/22 1150     Fungus (Mycology) Culture Culture in progress            I have reviewed all pertinent labs within the past 24 hours.    Estimated Creatinine Clearance: 72.6 mL/min (based on SCr of 1.3 mg/dL).    Diagnostic Results:  I have reviewed and interpreted all pertinent imaging results/findings within the past 24 hours.    Assessment and Plan:     Tim Richards is a 56 y.o.  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  His post operative course was complicated by cardiogenic shock/RV failure requiring VA-ECMO.  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  He has a 8 Fr cuffed Shiley trach.  He had a prolonged and complicated hospital course from 6/24/22 - 9/4/22, was discharged to rehab (see  clinic visit from 9/22/22 for additional details).  Of note he was last seen in clinic by Dr. Ragsdale on 9/22/22 after being discharged from rehab and at the time was noted to have purulence from the groin site and was placed on doxycycline PO.  He has had complaints of fatigue since his last hospitalization, per his wife.  This morning he was sitting and was crossing his legs for a prolonged period of time, and when he uncrossed them he noticed a pool of blood under him and active bleeding from his R groin where his previous ECMO cannula was.  He began to feel lightheaded when he tried walking.  Per wife he had 2 low flow alarms at home.      In the ED, patient stated he felt at his baseline and had no complaints.  No active bleeding at the time of my evaluation from R groin site.  Doppler BP was 80/0.  Hgb dropped from 9.4 -> 8.9 over the past 2 days.  INR was 3.0.  CT Angio performed per CV surgery recommendations.  HTS was consulted for admission, CT surgery was consulted in the ED for evaluation of groin hematoma.       Cardiovascular Studies  TTE 8/30/22   There is an LVAD present. Base speed is 6300 RPMs. The pump type is a Heartmate III. The interventricular septum appears midline. The aortic valve does not open.   The left ventricle is severely enlarged with eccentric hypertrophy and severely decreased systolic function.   The estimated ejection fraction is 10%.   There is left ventricular global hypokinesis.   Left ventricular diastolic dysfunction.   There is trivial continuous aortic regurgitation.   There is abnormal septal wall motion.   The right ventricle is poorly seen, but appears enlarged with reduced systolic function.   Mild tricuspid regurgitation.      * Pseudoaneurysm of right femoral artery  -Vascular surgery consulted, appreciate recommendations  -CTA shows 3 new pseudoaneurysms of R femoral artery  -S/p  Right Ileofemoral bypass 9/27/22 (end to end). Distal External Iliac to  distal femoral artery  -Wound vac in place     LVAD (left ventricular assist device) present  -S/P S/P DT HM2 3/8/2018 then S/P DT HM3 exchange 7/13/2022 2/2 to thrombosis of HM2 thought caused by COVID PNA  -Current speed 6400  -Last TTE done 8/30/2022 at 6300: LVEDD 7.44, LVEF 10%, RV appears enlarged and decreased, trace AI, trace MR, mild TR, PAS > 28, IVC 3, AV does not open, septum midline  -restarted coumadin post op and minimally intense heparin gtt. Goal INR 2.0-3.0.  Subtherapeutic today.  Previous home dose was 5mg Qdaily   -LDH is stable    Procedure: Device Interrogation Including analysis of device parameters  Current Settings: Ventricular Assist Device  Review of device function is stable    TXP LVAD INTERROGATIONS 9/30/2022 9/30/2022 9/30/2022 9/30/2022 9/29/2022 9/29/2022 9/29/2022   Type HeartMate3 HeartMate3 HeartMate3 HeartMate3 HeartMate3 HeartMate3 HeartMate3   Flow 5.4 5.5 5.5 5.4 5.6 5.6 5.6   Speed 6400 6400 6400 6400 6400 6400 6400   PI 1.8 2.0 1.5 2.1 1.8 1.6 1.9   Power ([place]) 5.5 5.7 5.5 5.6 5.5 5.6 5.5   LSL 6000 6000 6000 6000 6000 6000 6000   Low Flow Alarm - - - - - - -   High Power Alarm - - - - - - -   Pulsatility Intermittent pulse Intermittent pulse Intermittent pulse Intermittent pulse - - -         Groin hematoma  -Was on VA-ECMO at last hospitalization with successful decannulation  -CTA revealed 3 possible new pseudoaneurysms  -INR 3 on admission, since reversed with PO vit K and FFP.  Holding A/C until further instruction from surgical team regarding resumption of A/C.   -Hgb was trending down since admit. Transfusing to keep Hgb > 10  -Cultures from right groin wound with ESBL E Coli, per ID hold dapto and continue on ertapenem for total of 6 weeks.     Chronic combined systolic and diastolic congestive heart failure  -NICM   -Echo 8/30/22 EF: 10%, LVEDD: 7.44  -Patient was on Torsemide prior to admit: 40mg Qdaily   -GDMT: N/A  -Euvolemic on exam,   -Maintain K>4.0,  Mg>2.0    VT (ventricular tachycardia)  -Patient has [employer] SICD  -Currently on amiodarone 400mg qd and mexiletine 250mg TID, will continue     Hypertension  -BP controlled        YANET Coleman  Heart Transplant  [facility] - Cardiology Stepdown

## 2022-10-02 NOTE — SUBJECTIVE & OBJECTIVE
**Interval History: Post op day 5. Doing well without any evidence of bleeding. Pain under control. Given suppository yesterday and had BM.  Lactulose discontinued as it was giving him abdominal cramps.  His home dose of Torsemide is still on hold; will not resume yet as he looks euvolemic doesn't look like he needs it yet.     Dispo pending therapeutic INR and removal of trach with Dr. Holt    Continuous Infusions:   heparin (porcine) in D5W 12 Units/kg/hr (10/02/22 0836)     Scheduled Meds:   acetaminophen  1,000 mg Oral TID    amiodarone  400 mg Oral Daily    ertapenem (INVANZ) IVPB  1 g Intravenous Q24H    levothyroxine  50 mcg Oral Before breakfast    magnesium oxide  400 mg Oral Daily with lunch    mexiletine  250 mg Oral Q8H    mirtazapine  30 mg Oral QHS    pantoprazole  40 mg Oral Daily with lunch    polyethylene glycol  17 g Oral Daily    sodium chloride 0.9%  10 mL Intravenous Q6H    warfarin  5 mg Oral Daily     PRN Meds:sodium chloride, sodium chloride, sodium chloride 0.9%, sodium chloride 0.9%, ALPRAZolam, bisacodyL, ondansetron, oxyCODONE, oxyCODONE, Flushing PICC Protocol **AND** sodium chloride 0.9% **AND** sodium chloride 0.9%, zolpidem    Review of patient's allergies indicates:   Allergen Reactions    Lisinopril Anaphylaxis    Hydralazine analogues      Chronic constipation, impotence, dizziness     Objective:     Vital Signs (Most Recent):  Temp: 97.9 °F (36.6 °C) (10/02/22 0700)  Pulse: 74 (10/02/22 0859)  Resp: 16 (10/02/22 0700)  BP: (!) 80/0 (10/02/22 0700)  SpO2: (!) 94 % (10/02/22 0859)   Vital Signs (24h Range):  Temp:  [97.8 °F (36.6 °C)-98.1 °F (36.7 °C)] 97.9 °F (36.6 °C)  Pulse:  [73-96] 74  Resp:  [16-22] 16  SpO2:  [92 %-96 %] 94 %  BP: ()/(0-60) 80/0     Patient Vitals for the past 72 hrs (Last 3 readings):   Weight   10/02/22 0455 89.2 kg (196 lb 10.4 oz)   10/01/22 0524 85.8 kg (189 lb 2.5 oz)   09/30/22 0528 86 kg (189 lb 9.5 oz)       Body mass index is 25.94  kg/m².      Intake/Output Summary (Last 24 hours) at 10/2/2022 1003  Last data filed at 10/2/2022 0600  Gross per 24 hour   Intake 500 ml   Output 250 ml   Net 250 ml         Hemodynamic Parameters:       Telemetry: SR    Physical Exam  Constitutional:       Appearance: Normal appearance.   HENT:      Head: Normocephalic and atraumatic.   Eyes:      Extraocular Movements: Extraocular movements intact.      Pupils: Pupils are equal, round, and reactive to light.   Neck:      Comments: Neck veins are not elevated  Cardiovascular:      Rate and Rhythm: Normal rate and regular rhythm.      Comments: Smooth VAD hum  Pulmonary:      Effort: Pulmonary effort is normal.      Breath sounds: Normal breath sounds.   Abdominal:      General: Bowel sounds are normal. There is no distension.      Palpations: Abdomen is soft.      Tenderness: There is no abdominal tenderness.      Comments: DLES dressing c/d/i   Musculoskeletal:         General: No swelling. Normal range of motion.      Cervical back: Normal range of motion and neck supple.      Right lower leg: No edema.      Left lower leg: No edema.      Comments: R groin with wound vac and CLEO drain. No drainage or bleeding.    Skin:     General: Skin is warm and dry.      Capillary Refill: Capillary refill takes 2 to 3 seconds.   Neurological:      General: No focal deficit present.      Mental Status: He is alert and oriented to person, place, and time.   Psychiatric:         Mood and Affect: Mood normal.         Behavior: Behavior normal.         Thought Content: Thought content normal.         Judgment: Judgment normal.       Significant Labs:  CBC:  Recent Labs   Lab 10/01/22  0906 10/01/22  1642 10/02/22  0454   WBC 9.78 9.46 12.95*   RBC 3.10* 2.93* 2.91*   HGB 8.5* 8.1* 7.8*   HCT 28.3* 26.6* 26.3*    244 261   MCV 91 91 90   MCH 27.4 27.6 26.8*   MCHC 30.0* 30.5* 29.7*       BNP:  Recent Labs   Lab 09/26/22  0353 09/28/22  0332 09/30/22  0546   BNP 97 787* 179*        CMP:  Recent Labs   Lab 09/30/22  0546 10/01/22  0507 10/02/22  0454   GLU 76 70 87   CALCIUM 8.8 8.6* 8.6*   ALBUMIN 2.0* 1.9* 1.9*   PROT 5.8* 5.5* 5.6*    134* 135*   K 4.0 3.6 4.1   CO2 25 25 24    105 105   BUN 13 12 12   CREATININE 1.2 0.9 1.3   ALKPHOS 85 76 83   ALT 17 11 20   AST 24 20 38   BILITOT 0.5 0.4 0.4        Coagulation:   Recent Labs   Lab 09/30/22  0546 10/01/22  0507 10/02/22  0454   INR 1.1 1.1 1.3*   APTT 40.7* 42.2* 44.2*       LDH:  Recent Labs   Lab 09/30/22  0546 10/01/22  0507 10/02/22  0454   * 241 282*       Microbiology:  Microbiology Results (last 7 days)       Procedure Component Value Units Date/Time    Aerobic culture [[id number]]  (Abnormal)  (Susceptibility) Collected: 09/27/22 0944    Order Status: Completed Specimen: Wound from Groin Updated: 10/01/22 1345     Aerobic Bacterial Culture ESCHERICHIA COLI ESBL  Moderate      Narrative:      1. RIGHT GROIN WOUND    Blood culture [[id number]] Collected: 09/25/22 0138    Order Status: Completed Specimen: Blood from Peripheral, Hand, Right Updated: 09/30/22 0612     Blood Culture, Routine No growth after 5 days.    Narrative:      Collection has been rescheduled by TR3 at 09/25/2022 00:31 Reason: Pt   dont wanna get stuck rn rachille  Collection has been rescheduled by TR3 at 09/25/2022 00:31 Reason: Pt   dont wanna get stuck rn rachille    Blood culture [[id number]] Collected: 09/25/22 0133    Order Status: Completed Specimen: Blood from Peripheral, Lower Arm, Left Updated: 09/30/22 0612     Blood Culture, Routine No growth after 5 days.    Narrative:      Collection has been rescheduled by TR3 at 09/25/2022 00:31 Reason: Pt   dont wanna get stuck rn rachille  Collection has been rescheduled by TR3 at 09/25/2022 00:31 Reason: Pt   dont wanna get stuck beni tabor    Aerobic culture [[id number]]  (Abnormal)  (Susceptibility) Collected: 09/27/22 0944    Order Status: Completed Specimen: Wound from Groin Updated:  09/29/22 1300     Aerobic Bacterial Culture ESCHERICHIA COLI  Rare      Narrative:      4. FEMORAL TISSUE RIGHT GROIN    Aerobic culture [[id number]]  (Abnormal)  (Susceptibility) Collected: 09/27/22 0944    Order Status: Completed Specimen: Wound from Groin Updated: 09/29/22 1259     Aerobic Bacterial Culture ESCHERICHIA COLI ESBL  Rare      Narrative:      3. RIGHT FEMORAL GRAFT    Aerobic culture [[id number]]  (Abnormal)  (Susceptibility) Collected: 09/27/22 0944    Order Status: Completed Specimen: Wound from Groin Updated: 09/29/22 1248     Aerobic Bacterial Culture CITROBACTER FARMERI  Rare      Narrative:      2. RIGHT FEMORAL ARTERY    Culture, Anaerobe [[id number]] Collected: 09/27/22 0944    Order Status: Completed Specimen: Wound from Groin Updated: 09/29/22 1059     Anaerobic Culture Culture in progress    Narrative:      4. FEMORAL TISSUE RIGHT GROIN    Culture, Anaerobe [[id number]] Collected: 09/27/22 0944    Order Status: Completed Specimen: Wound from Groin Updated: 09/29/22 1058     Anaerobic Culture Culture in progress    Narrative:      3. RIGHT FEMORAL GRAFT    Culture, Anaerobe [[id number]] Collected: 09/27/22 0944    Order Status: Completed Specimen: Wound from Groin Updated: 09/29/22 1057     Anaerobic Culture Culture in progress    Narrative:      2. RIGHT FEMORAL ARTERY    Culture, Anaerobe [[id number]] Collected: 09/27/22 0944    Order Status: Completed Specimen: Wound from Groin Updated: 09/29/22 1056     Anaerobic Culture Culture in progress    Narrative:      1. RIGHT GROIN WOUND    Culture, Anaerobe [[id number]] Collected: 09/25/22 0815    Order Status: Completed Specimen: Wound from Groin Updated: 09/29/22 0932     Anaerobic Culture No anaerobes isolated    AFB Culture & Smear [[id number]] Collected: 09/27/22 0944    Order Status: Completed Specimen: Wound from Groin Updated: 09/29/22 0927     AFB Culture & Smear Culture in progress     AFB CULTURE STAIN No acid fast bacilli seen.     Narrative:      1. RIGHT GROIN WOUND    AFB Culture & Smear [[id number]] Collected: 09/27/22 0944    Order Status: Completed Specimen: Wound from Groin Updated: 09/28/22 2127     AFB Culture & Smear Culture in progress     AFB CULTURE STAIN No acid fast bacilli seen.    Narrative:      2. RIGHT FEMORAL ARTERY    AFB Culture & Smear [[id number]] Collected: 09/27/22 0944    Order Status: Completed Specimen: Wound from Groin Updated: 09/28/22 2127     AFB Culture & Smear Culture in progress     AFB CULTURE STAIN No acid fast bacilli seen.    Narrative:      3. RIGHT FEMORAL GRAFT    AFB Culture & Smear [[id number]] Collected: 09/27/22 0944    Order Status: Completed Specimen: Wound from Groin Updated: 09/28/22 2127     AFB Culture & Smear Culture in progress     AFB CULTURE STAIN No acid fast bacilli seen.    Narrative:      4. FEMORAL TISSUE RIGHT GROIN    Gram stain [[id number]] Collected: 09/27/22 0944    Order Status: Completed Specimen: Wound from Groin Updated: 09/27/22 1312     Gram Stain Result Few WBC's      No organisms seen    Narrative:      1. RIGHT GROIN WOUND    Gram stain [[id number]] Collected: 09/27/22 0944    Order Status: Completed Specimen: Wound from Groin Updated: 09/27/22 1204     Gram Stain Result No WBC's      No organisms seen    Narrative:      2. RIGHT FEMORAL ARTERY    Gram stain [[id number]] Collected: 09/27/22 0944    Order Status: Completed Specimen: Wound from Groin Updated: 09/27/22 1203     Gram Stain Result No WBC's      No organisms seen    Narrative:      3. RIGHT FEMORAL GRAFT    Gram stain [[id number]] Collected: 09/27/22 0944    Order Status: Completed Specimen: Wound from Groin Updated: 09/27/22 1202     Gram Stain Result No WBC's      No organisms seen    Narrative:      4. FEMORAL TISSUE RIGHT GROIN    Fungus culture [[id number]] Collected: 09/27/22 0944    Order Status: Sent Specimen: Wound from Groin Updated: 09/27/22 1036    Fungus culture [[id number]] Collected: 09/27/22 0944     Order Status: Sent Specimen: Wound from Groin Updated: 09/27/22 1034    Fungus culture [[id number]] Collected: 09/27/22 0944    Order Status: Sent Specimen: Wound from Groin Updated: 09/27/22 1032    Fungus culture [[id number]] Collected: 09/27/22 0944    Order Status: Sent Specimen: Wound from Groin Updated: 09/27/22 1029    Aerobic culture [[id number]]  (Abnormal)  (Susceptibility) Collected: 09/25/22 0815    Order Status: Completed Specimen: Wound from Groin Updated: 09/27/22 0945     Aerobic Bacterial Culture ESCHERICHIA COLI ESBL  Few      Fungus culture [[id number]] Collected: 09/25/22 0815    Order Status: Completed Specimen: Wound from Groin Updated: 09/26/22 1150     Fungus (Mycology) Culture Culture in progress            I have reviewed all pertinent labs within the past 24 hours.    Estimated Creatinine Clearance: 72.6 mL/min (based on SCr of 1.3 mg/dL).    Diagnostic Results:  I have reviewed and interpreted all pertinent imaging results/findings within the past 24 hours.

## 2022-10-02 NOTE — NURSING
"LVAD dressing change completed using sterile technique with daily kit. DLES is a "2" with minimal drainage noted on the drain sponge. Tolerated without any complication. Sutures remain intact, no redness, or tenderness noted.      "

## 2022-10-02 NOTE — PROGRESS NOTES
[facility]  Vascular Surgery  Progress Note    Patient Name: Tim Richards  MRN: [id number]  Admission Date: 9/24/2022  Primary Care Provider: Deyanira Booth MD    Subjective:     Interval History: NAEON. WVC at 13. Denies any pain. Patient refers he had some nausea yesterday otherwise doing ok. WV in place with good seal    Post-Op Info:  Procedure(s) (LRB):  CREATION, BYPASS, ARTERIAL, ILIAC TO FEMORAL WITH GRAFT (Right)  CREATION, FLAP, MUSCLE ROTATION, SARTORIUS AND RECTUS FEMORIS (Right)  APPLICATION, WOUND VAC (Right)   5 Days Post-Op       Medications:  Continuous Infusions:   heparin (porcine) in D5W 12 Units/kg/hr (10/02/22 0836)     Scheduled Meds:   acetaminophen  1,000 mg Oral TID    amiodarone  400 mg Oral Daily    ertapenem (INVANZ) IVPB  1 g Intravenous Q24H    levothyroxine  50 mcg Oral Before breakfast    magnesium oxide  400 mg Oral Daily with lunch    mexiletine  250 mg Oral Q8H    mirtazapine  30 mg Oral QHS    pantoprazole  40 mg Oral Daily with lunch    polyethylene glycol  17 g Oral Daily    sodium chloride 0.9%  10 mL Intravenous Q6H    warfarin  5 mg Oral Daily     PRN Meds:sodium chloride, sodium chloride, sodium chloride 0.9%, sodium chloride 0.9%, ALPRAZolam, bisacodyL, ondansetron, oxyCODONE, oxyCODONE, Flushing PICC Protocol **AND** sodium chloride 0.9% **AND** sodium chloride 0.9%, zolpidem     Objective:     Vital Signs (Most Recent):  Temp: 97.9 °F (36.6 °C) (10/02/22 0700)  Pulse: 74 (10/02/22 0859)  Resp: 16 (10/02/22 0700)  BP: (!) 80/0 (10/02/22 0700)  SpO2: (!) 94 % (10/02/22 0859) Vital Signs (24h Range):  Temp:  [97.8 °F (36.6 °C)-98.1 °F (36.7 °C)] 97.9 °F (36.6 °C)  Pulse:  [73-96] 74  Resp:  [16-22] 16  SpO2:  [92 %-96 %] 94 %  BP: ()/(0-60) 80/0         Physical Exam  Constitutional:       Appearance: Normal appearance.   HENT:      Head: Normocephalic and atraumatic.      Mouth/Throat:      Mouth: Mucous membranes are moist.    Eyes:      Pupils: Pupils are equal, round, and reactive to light.   Cardiovascular:      Rate and Rhythm: Regular rhythm.   Abdominal:      General: Abdomen is flat.      Palpations: Abdomen is soft.   Musculoskeletal:      Comments: 5/5 strength all 4 extremities, biphasic waveforms DP bilaterally  Right groin area with wound vac and CLEO drain   Neurological:      Mental Status: He is alert.       Significant Labs:  ABGs: No results for input(s): PH, PCO2, PO2, HCO3, POCSATURATED, BE in the last 48 hours.  Cardiac markers: No results for input(s): CKMB, CPKMB, TROPONINT, TROPONINI, MYOGLOBIN in the last 48 hours.  CBC:   Recent Labs   Lab 10/02/22  0454   WBC 12.95*   RBC 2.91*   HGB 7.8*   HCT 26.3*      MCV 90   MCH 26.8*   MCHC 29.7*       CMP:   Recent Labs   Lab 10/02/22  0454   GLU 87   CALCIUM 8.6*   ALBUMIN 1.9*   PROT 5.6*   *   K 4.1   CO2 24      BUN 12   CREATININE 1.3   ALKPHOS 83   ALT 20   AST 38   BILITOT 0.4       Coagulation:   Recent Labs   Lab 10/02/22  0454   LABPROT 13.4*   INR 1.3*   APTT 44.2*       Lactic Acid: No results for input(s): LACTATE in the last 48 hours.  All pertinent labs from the last 24 hours have been reviewed.    Significant Diagnostics:  I have reviewed all pertinent imaging results/findings within the past 24 hours.    Assessment/Plan:     * Pseudoaneurysm of right femoral artery  Tim Richards is a 56 y.o. 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 Vascular surgery has been consulted for evaluation of right groin site concerning for pseudoaneurym. Patient is now s/p Rt groin exploration, creation of Right Ileofemoral bypass, and creation of muscle flap 9/27/22    Recommend:  - Infectious disease on the case  - ABx: meropenem  - HH for PT/OT and IV abx  - Anticoagulation per primary team:  coumadin, heparin ggt  - WV and CLEO drainage to be taken care of by plastic surgery team  - F/u appointment in 4 weeks with SADAF/TBI.            Jc Sal MD  Vascular Surgery  [facility] Step[facility]

## 2022-10-02 NOTE — NURSING
Pt didn't make urine output overnight. No discomforts and complaints. RN explained the need to scan the bladder but pt refused and states that he don't need it. RN informed the daytime nurse.

## 2022-10-02 NOTE — PLAN OF CARE
Pt educated on fall risk, pt remained free from falls/trauma/injury. Inj ondansetron given for nausea and vomiting. LVAD numbers and dopplers WNL. LVAD dsg change due. Patient refused to do at this time and prefer to do at daytime. Continuous wound Vac on 125 mm hg.  Heparin gtts infusing @ 12 units/kg/hr.Plan of care reviewed with pt, all questions answered. Bed locked in lowest position, call bell within reach, will continue to monitor.

## 2022-10-03 DIAGNOSIS — I72.4 PSEUDOANEURYSM OF RIGHT FEMORAL ARTERY: Primary | ICD-10-CM

## 2022-10-03 PROBLEM — E43 SEVERE MALNUTRITION: Status: ACTIVE | Noted: 2022-07-18

## 2022-10-03 PROBLEM — Z93.0 TRACHEOSTOMY PRESENT: Status: ACTIVE | Noted: 2022-10-03

## 2022-10-03 LAB
ALBUMIN SERPL BCP-MCNC: 1.8 G/DL (ref 3.5–5.2)
ALP SERPL-CCNC: 85 U/L (ref 55–135)
ALT SERPL W/O P-5'-P-CCNC: 20 U/L (ref 10–44)
ANION GAP SERPL CALC-SCNC: 6 MMOL/L (ref 8–16)
APTT BLDCRRT: 44.4 SEC (ref 21–32)
APTT BLDCRRT: 54.1 SEC (ref 21–32)
APTT BLDCRRT: 55.4 SEC (ref 21–32)
AST SERPL-CCNC: 34 U/L (ref 10–40)
BACTERIA SPEC ANAEROBE CULT: NORMAL
BACTERIA SPEC ANAEROBE CULT: NORMAL
BASOPHILS # BLD AUTO: 0.01 K/UL (ref 0–0.2)
BASOPHILS # BLD AUTO: 0.02 K/UL (ref 0–0.2)
BASOPHILS # BLD AUTO: 0.04 K/UL (ref 0–0.2)
BASOPHILS NFR BLD: 0.1 % (ref 0–1.9)
BASOPHILS NFR BLD: 0.3 % (ref 0–1.9)
BASOPHILS NFR BLD: 0.6 % (ref 0–1.9)
BILIRUB DIRECT SERPL-MCNC: 0.3 MG/DL (ref 0.1–0.3)
BILIRUB SERPL-MCNC: 0.4 MG/DL (ref 0.1–1)
BNP SERPL-MCNC: 291 PG/ML (ref 0–99)
BUN SERPL-MCNC: 12 MG/DL (ref 6–20)
CALCIUM SERPL-MCNC: 8.4 MG/DL (ref 8.7–10.5)
CHLORIDE SERPL-SCNC: 103 MMOL/L (ref 95–110)
CO2 SERPL-SCNC: 25 MMOL/L (ref 23–29)
CREAT SERPL-MCNC: 1.1 MG/DL (ref 0.5–1.4)
CRP SERPL-MCNC: 97.8 MG/L (ref 0–8.2)
DIFFERENTIAL METHOD: ABNORMAL
EOSINOPHIL # BLD AUTO: 0.3 K/UL (ref 0–0.5)
EOSINOPHIL # BLD AUTO: 0.4 K/UL (ref 0–0.5)
EOSINOPHIL # BLD AUTO: 0.6 K/UL (ref 0–0.5)
EOSINOPHIL NFR BLD: 3.3 % (ref 0–8)
EOSINOPHIL NFR BLD: 5 % (ref 0–8)
EOSINOPHIL NFR BLD: 8.6 % (ref 0–8)
ERYTHROCYTE [DISTWIDTH] IN BLOOD BY AUTOMATED COUNT: 14.7 % (ref 11.5–14.5)
ERYTHROCYTE [DISTWIDTH] IN BLOOD BY AUTOMATED COUNT: 14.7 % (ref 11.5–14.5)
ERYTHROCYTE [DISTWIDTH] IN BLOOD BY AUTOMATED COUNT: 14.9 % (ref 11.5–14.5)
EST. GFR  (NO RACE VARIABLE): >60 ML/MIN/1.73 M^2
FERRITIN SERPL-MCNC: 357 NG/ML (ref 20–300)
GLUCOSE SERPL-MCNC: 64 MG/DL (ref 70–110)
HCT VFR BLD AUTO: 26.2 % (ref 40–54)
HCT VFR BLD AUTO: 26.6 % (ref 40–54)
HCT VFR BLD AUTO: 27.8 % (ref 40–54)
HGB BLD-MCNC: 7.7 G/DL (ref 14–18)
HGB BLD-MCNC: 8 G/DL (ref 14–18)
HGB BLD-MCNC: 8.1 G/DL (ref 14–18)
IMM GRANULOCYTES # BLD AUTO: 0.09 K/UL (ref 0–0.04)
IMM GRANULOCYTES # BLD AUTO: 0.09 K/UL (ref 0–0.04)
IMM GRANULOCYTES # BLD AUTO: 0.1 K/UL (ref 0–0.04)
IMM GRANULOCYTES NFR BLD AUTO: 1 % (ref 0–0.5)
IMM GRANULOCYTES NFR BLD AUTO: 1.3 % (ref 0–0.5)
IMM GRANULOCYTES NFR BLD AUTO: 1.3 % (ref 0–0.5)
INR PPP: 1.5 (ref 0.8–1.2)
IRON SERPL-MCNC: 24 UG/DL (ref 45–160)
LDH SERPL L TO P-CCNC: 238 U/L (ref 110–260)
LYMPHOCYTES # BLD AUTO: 0.9 K/UL (ref 1–4.8)
LYMPHOCYTES # BLD AUTO: 1 K/UL (ref 1–4.8)
LYMPHOCYTES # BLD AUTO: 1.4 K/UL (ref 1–4.8)
LYMPHOCYTES NFR BLD: 11.2 % (ref 18–48)
LYMPHOCYTES NFR BLD: 11.9 % (ref 18–48)
LYMPHOCYTES NFR BLD: 20.6 % (ref 18–48)
MAGNESIUM SERPL-MCNC: 2.1 MG/DL (ref 1.6–2.6)
MCH RBC QN AUTO: 26.8 PG (ref 27–31)
MCH RBC QN AUTO: 26.8 PG (ref 27–31)
MCH RBC QN AUTO: 27.2 PG (ref 27–31)
MCHC RBC AUTO-ENTMCNC: 28.9 G/DL (ref 32–36)
MCHC RBC AUTO-ENTMCNC: 29.1 G/DL (ref 32–36)
MCHC RBC AUTO-ENTMCNC: 30.5 G/DL (ref 32–36)
MCV RBC AUTO: 89 FL (ref 82–98)
MCV RBC AUTO: 92 FL (ref 82–98)
MCV RBC AUTO: 93 FL (ref 82–98)
MONOCYTES # BLD AUTO: 0.8 K/UL (ref 0.3–1)
MONOCYTES # BLD AUTO: 0.9 K/UL (ref 0.3–1)
MONOCYTES # BLD AUTO: 1 K/UL (ref 0.3–1)
MONOCYTES NFR BLD: 11.3 % (ref 4–15)
MONOCYTES NFR BLD: 11.4 % (ref 4–15)
MONOCYTES NFR BLD: 12.1 % (ref 4–15)
NEUTROPHILS # BLD AUTO: 3.9 K/UL (ref 1.8–7.7)
NEUTROPHILS # BLD AUTO: 5.3 K/UL (ref 1.8–7.7)
NEUTROPHILS # BLD AUTO: 6.5 K/UL (ref 1.8–7.7)
NEUTROPHILS NFR BLD: 56.8 % (ref 38–73)
NEUTROPHILS NFR BLD: 70.2 % (ref 38–73)
NEUTROPHILS NFR BLD: 73 % (ref 38–73)
NRBC BLD-RTO: 0 /100 WBC
PHOSPHATE SERPL-MCNC: 2.7 MG/DL (ref 2.7–4.5)
PLATELET # BLD AUTO: 266 K/UL (ref 150–450)
PLATELET # BLD AUTO: 275 K/UL (ref 150–450)
PLATELET # BLD AUTO: 318 K/UL (ref 150–450)
PMV BLD AUTO: 9.7 FL (ref 9.2–12.9)
PMV BLD AUTO: 9.9 FL (ref 9.2–12.9)
PMV BLD AUTO: 9.9 FL (ref 9.2–12.9)
POTASSIUM SERPL-SCNC: 3.7 MMOL/L (ref 3.5–5.1)
PREALB SERPL-MCNC: 13 MG/DL (ref 20–43)
PROT SERPL-MCNC: 5.3 G/DL (ref 6–8.4)
PROTHROMBIN TIME: 15.6 SEC (ref 9–12.5)
RBC # BLD AUTO: 2.87 M/UL (ref 4.6–6.2)
RBC # BLD AUTO: 2.94 M/UL (ref 4.6–6.2)
RBC # BLD AUTO: 3.02 M/UL (ref 4.6–6.2)
SATURATED IRON: 15 % (ref 20–50)
SODIUM SERPL-SCNC: 134 MMOL/L (ref 136–145)
TOTAL IRON BINDING CAPACITY: 160 UG/DL (ref 250–450)
TRANSFERRIN SERPL-MCNC: 108 MG/DL (ref 200–375)
WBC # BLD AUTO: 6.84 K/UL (ref 3.9–12.7)
WBC # BLD AUTO: 7.58 K/UL (ref 3.9–12.7)
WBC # BLD AUTO: 8.85 K/UL (ref 3.9–12.7)

## 2022-10-03 PROCEDURE — 99900035 HC TECH TIME PER 15 MIN (STAT)

## 2022-10-03 PROCEDURE — 93750 INTERROGATION VAD IN PERSON: CPT | Mod: ,,, | Performed by: INTERNAL MEDICINE

## 2022-10-03 PROCEDURE — 84134 ASSAY OF PREALBUMIN: CPT | Performed by: PHYSICIAN ASSISTANT

## 2022-10-03 PROCEDURE — 99233 PR SUBSEQUENT HOSPITAL CARE,LEVL III: ICD-10-PCS | Mod: 25,,, | Performed by: INTERNAL MEDICINE

## 2022-10-03 PROCEDURE — 97530 THERAPEUTIC ACTIVITIES: CPT

## 2022-10-03 PROCEDURE — 20600001 HC STEP DOWN PRIVATE ROOM

## 2022-10-03 PROCEDURE — 93750 PR INTERROGATE VENT ASSIST DEV, IN PERSON, W PHYSICIAN ANALYSIS: ICD-10-PCS | Mod: ,,, | Performed by: INTERNAL MEDICINE

## 2022-10-03 PROCEDURE — 83880 ASSAY OF NATRIURETIC PEPTIDE: CPT | Performed by: NURSE PRACTITIONER

## 2022-10-03 PROCEDURE — 84100 ASSAY OF PHOSPHORUS: CPT | Performed by: PHYSICIAN ASSISTANT

## 2022-10-03 PROCEDURE — 63600175 PHARM REV CODE 636 W HCPCS: Performed by: PHYSICIAN ASSISTANT

## 2022-10-03 PROCEDURE — 97535 SELF CARE MNGMENT TRAINING: CPT

## 2022-10-03 PROCEDURE — 99233 SBSQ HOSP IP/OBS HIGH 50: CPT | Mod: 25,,, | Performed by: INTERNAL MEDICINE

## 2022-10-03 PROCEDURE — 82728 ASSAY OF FERRITIN: CPT | Performed by: PHYSICIAN ASSISTANT

## 2022-10-03 PROCEDURE — 83615 LACTATE (LD) (LDH) ENZYME: CPT | Performed by: NURSE PRACTITIONER

## 2022-10-03 PROCEDURE — 27000207 HC ISOLATION

## 2022-10-03 PROCEDURE — 94761 N-INVAS EAR/PLS OXIMETRY MLT: CPT

## 2022-10-03 PROCEDURE — 80048 BASIC METABOLIC PNL TOTAL CA: CPT | Performed by: NURSE PRACTITIONER

## 2022-10-03 PROCEDURE — 25000003 PHARM REV CODE 250: Performed by: STUDENT IN AN ORGANIZED HEALTH CARE EDUCATION/TRAINING PROGRAM

## 2022-10-03 PROCEDURE — 85730 THROMBOPLASTIN TIME PARTIAL: CPT | Performed by: PHYSICIAN ASSISTANT

## 2022-10-03 PROCEDURE — 86140 C-REACTIVE PROTEIN: CPT | Performed by: PHYSICIAN ASSISTANT

## 2022-10-03 PROCEDURE — 63600175 PHARM REV CODE 636 W HCPCS: Performed by: STUDENT IN AN ORGANIZED HEALTH CARE EDUCATION/TRAINING PROGRAM

## 2022-10-03 PROCEDURE — 85025 COMPLETE CBC W/AUTO DIFF WBC: CPT | Mod: 91 | Performed by: NURSE PRACTITIONER

## 2022-10-03 PROCEDURE — A4216 STERILE WATER/SALINE, 10 ML: HCPCS | Performed by: INTERNAL MEDICINE

## 2022-10-03 PROCEDURE — 83735 ASSAY OF MAGNESIUM: CPT | Performed by: NURSE PRACTITIONER

## 2022-10-03 PROCEDURE — 27000248 HC VAD-ADDITIONAL DAY

## 2022-10-03 PROCEDURE — 25000003 PHARM REV CODE 250: Performed by: NURSE PRACTITIONER

## 2022-10-03 PROCEDURE — 85610 PROTHROMBIN TIME: CPT | Performed by: STUDENT IN AN ORGANIZED HEALTH CARE EDUCATION/TRAINING PROGRAM

## 2022-10-03 PROCEDURE — 25000003 PHARM REV CODE 250: Performed by: INTERNAL MEDICINE

## 2022-10-03 PROCEDURE — 85730 THROMBOPLASTIN TIME PARTIAL: CPT | Mod: 91 | Performed by: INTERNAL MEDICINE

## 2022-10-03 PROCEDURE — 97116 GAIT TRAINING THERAPY: CPT

## 2022-10-03 PROCEDURE — 84466 ASSAY OF TRANSFERRIN: CPT | Performed by: PHYSICIAN ASSISTANT

## 2022-10-03 PROCEDURE — 80076 HEPATIC FUNCTION PANEL: CPT | Performed by: NURSE PRACTITIONER

## 2022-10-03 RX ADMIN — LEVOTHYROXINE SODIUM 50 MCG: 50 TABLET ORAL at 05:10

## 2022-10-03 RX ADMIN — MEXILETINE HYDROCHLORIDE 250 MG: 250 CAPSULE ORAL at 05:10

## 2022-10-03 RX ADMIN — OXYCODONE 5 MG: 5 TABLET ORAL at 04:10

## 2022-10-03 RX ADMIN — WARFARIN SODIUM 5 MG: 5 TABLET ORAL at 04:10

## 2022-10-03 RX ADMIN — AMIODARONE HYDROCHLORIDE 400 MG: 200 TABLET ORAL at 08:10

## 2022-10-03 RX ADMIN — MEXILETINE HYDROCHLORIDE 250 MG: 250 CAPSULE ORAL at 01:10

## 2022-10-03 RX ADMIN — ALPRAZOLAM 1 MG: 0.5 TABLET ORAL at 09:10

## 2022-10-03 RX ADMIN — MEXILETINE HYDROCHLORIDE 250 MG: 250 CAPSULE ORAL at 09:10

## 2022-10-03 RX ADMIN — HEPARIN SODIUM 10 UNITS/KG/HR: 10000 INJECTION, SOLUTION INTRAVENOUS at 10:10

## 2022-10-03 RX ADMIN — ERTAPENEM 1 G: 1 INJECTION INTRAMUSCULAR; INTRAVENOUS at 12:10

## 2022-10-03 RX ADMIN — ACETAMINOPHEN 1000 MG: 500 TABLET ORAL at 04:10

## 2022-10-03 RX ADMIN — PANTOPRAZOLE SODIUM 40 MG: 40 TABLET, DELAYED RELEASE ORAL at 12:10

## 2022-10-03 RX ADMIN — Medication 10 ML: at 05:10

## 2022-10-03 RX ADMIN — Medication 10 ML: at 12:10

## 2022-10-03 RX ADMIN — ACETAMINOPHEN 1000 MG: 500 TABLET ORAL at 09:10

## 2022-10-03 RX ADMIN — Medication 10 ML: at 06:10

## 2022-10-03 RX ADMIN — MIRTAZAPINE 30 MG: 30 TABLET, FILM COATED ORAL at 09:10

## 2022-10-03 RX ADMIN — Medication 400 MG: at 12:10

## 2022-10-03 NOTE — ASSESSMENT & PLAN NOTE
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 Goal INR 2.0-3.0.  Subtherapeutic today.  Previous home dose was 5mg Qdaily   -LDH is stable    Procedure: Device Interrogation Including analysis of device parameters  Current Settings: Ventricular Assist Device  Review of device function is stable    TXP LVAD INTERROGATIONS 10/3/2022 10/3/2022 10/3/2022 10/3/2022 10/2/2022 10/2/2022 10/2/2022   Type HeartMate3 HeartMate3 HeartMate3 HeartMate3 HeartMate3 HeartMate3 HeartMate3   Flow 5.1 5.5 5.4 5.6 5.6 5.6 5.6   Speed 6400 6400 6400 6400 6400 6400 6400   PI 3.4 2.2 2 1.9 1.5 2.2 2.6   Power (Jackson) 5.4 5.5 5.5 5.6 5.5 5.5 5.5   LSL - 6000 6000 6000 6000 6000 6000   Low Flow Alarm - - - - - - -   High Power Alarm - - - - - - -   Pulsatility - - - - - Intermittent pulse Intermittent pulse

## 2022-10-03 NOTE — ASSESSMENT & PLAN NOTE
Tim Richards is a 56 y.o. 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 Vascular surgery has been consulted for evaluation of right groin site concerning for pseudoaneurym. Patient is now s/p Rt groin exploration, creation of Right Ileofemoral bypass, and creation of muscle flap 9/27/22    Recommend:  - Infectious disease following. Plan for outpatient antibiotics. Has PICC.   - ABx: meropenem  -  for PT/OT and IV abx  - Anticoagulation per primary team: coumadin, heparin ggt  - WV and CLEO drainage to be taken care of by plastic surgery team  - F/u appointment in 4 weeks with SADAF/TBI.  - Albumin low. Recommend nutrition consult and boost TID with meals.

## 2022-10-03 NOTE — PROGRESS NOTES
10/03/22 0206 10/03/22 0207   Vital Signs   /75 (!) 94/0   MAP (mmHg) 90  --    BP Location Left arm Left arm   BP Method Automatic Doppler   Pt MAP WNL and MD Romero notified.

## 2022-10-03 NOTE — PROGRESS NOTES
Update    Pt presents as AAO x4, calm, cooperative, and asking and answering questions appropriately, caregiver not present. Pt states he is doing well and just finished working with OT. Pt states he is looking forward to going home and he states the team said he would be able to discharge this week. Pt would like his iron checked before discharge, LCSW will let team know. LCSW offered support and encouragement to Pt. No additional needs or questions were addressed during visit. SW providing ongoing psychosocial, counseling, & emotional support, education, resources, assistance, and discharge planning as indicated.  SW to continue to follow.

## 2022-10-03 NOTE — ASSESSMENT & PLAN NOTE
Nutrition Problem:  Severe Protein-Calorie Malnutrition  Malnutrition in the context of Chronic Illness/Injury     Related to (etiology):  Inadequate nutrient intake     Signs and Symptoms (as evidenced by):  Energy Intake: <75% of estimated energy requirement for >1 month  Weight Loss: 28% x 5 months      Interventions(treatment strategy):  Collaboration with other providers  ONS     Nutrition Diagnosis Status:  New

## 2022-10-03 NOTE — PROGRESS NOTES
10/03/2022  Casper Harris    Current provider:  Roslyn Ragsdale DO    Device interrogation:  TXP LVAD INTERROGATIONS 10/3/2022 10/3/2022 10/3/2022 10/2/2022 10/2/2022 10/2/2022 10/2/2022   Type HeartMate3 HeartMate3 HeartMate3 HeartMate3 HeartMate3 HeartMate3 HeartMate3   Flow 5.5 5.4 5.6 5.6 5.6 5.6 5.5   Speed 6400 6400 6400 6400 6400 6400 6400   PI 2.2 2 1.9 1.5 2.2 2.6 2.4   Power (Jackson) 5.5 5.5 5.6 5.5 5.5 5.5 5.7   LSL 6000 6000 6000 6000 6000 6000 6000   Low Flow Alarm - - - - - - -   High Power Alarm - - - - - - -   Pulsatility - - - - Intermittent pulse Intermittent pulse Intermittent pulse          Rounded on Tim Richards to ensure all mechanical assist device settings (IABP or VAD) were appropriate and all parameters were within limits.  I was able to ensure all back up equipment was present, the staff had no issues, and the Perfusion Department daily rounding was complete.      For implantable VADs: Interrogation of Ventricular assist device was performed with analysis of device parameters and review of device function. I have personally reviewed the interrogation findings and agree with findings as stated.     In emergency, the nursing units have been notified to contact the perfusion department either by:  Calling [phone] from 630am to 4pm Mon thru Fri, utilizing the On-Call Finder functionality of Epic and searching for Perfusion, or by contacting the hospital  from 4pm to 630am and on weekends and asking to speak with the perfusionist on call.    9:06 AM

## 2022-10-03 NOTE — PLAN OF CARE
Plan of care discussed with patient. Patient free from falls/traumas/injuries, fall precautions in place. LVAD DP and numbers WNL, smooth LVAD hum. PRN oxycodone administered for pain. IV abx continued. Wound vac to continuous suction on right  groin. Discussed medications and care. Patient has no questions at this time. Will continue to monitor.    Home

## 2022-10-03 NOTE — PROGRESS NOTES
10/02/2022  Fitz Christianson    Current provider:  Antonio Hadley Jr.,*    Device interrogation:  TXP LVAD INTERROGATIONS 10/2/2022 10/2/2022 10/2/2022 10/2/2022 10/2/2022 10/1/2022 10/1/2022   Type HeartMate3 HeartMate3 HeartMate3 HeartMate3 HeartMate3 HeartMate3 HeartMate3   Flow 5.6 5.6 5.6 5.5 5.5 5.3 5.2   Speed 6400 6400 6400 6400 6400 6400 6400   PI 1.5 2.2 2.6 2.4 2.2 1.8 2.4   Power (Jackson) 5.5 5.5 5.5 5.7 5.5 5.6 5.4   LSL 6000 6000 6000 6000 6000 6000 6000   Low Flow Alarm - - - - - - -   High Power Alarm - - - - - - -   Pulsatility - Intermittent pulse Intermittent pulse Intermittent pulse Intermittent pulse Intermittent pulse Intermittent pulse          Rounded on Tim Richards to ensure all mechanical assist device settings (IABP or VAD) were appropriate and all parameters were within limits.  I was able to ensure all back up equipment was present, the staff had no issues, and the Perfusion Department daily rounding was complete.      For implantable VADs: Interrogation of Ventricular assist device was performed with analysis of device parameters and review of device function. I have personally reviewed the interrogation findings and agree with findings as stated.     In emergency, the nursing units have been notified to contact the perfusion department either by:  Calling [phone] from 630am to 4pm Mon thru Fri, utilizing the On-Call Finder functionality of Epic and searching for Perfusion, or by contacting the hospital  from 4pm to 630am and on weekends and asking to speak with the perfusionist on call.    10:01 PM

## 2022-10-03 NOTE — CONSULTS
[facility] - [facility] Stepdown  Adult Nutrition  Consult Note    SUMMARY     Recommendations     1. Continue Cardiac diet       2. Add Boost Plus TID (chocolate) to optimize nutrient intake      3. RD to monitor and follow    Goals: Meet % EEN, EPN by RD f/u  Nutrition Goal Status: progressing towards goal  Communication of RD Recs:  (POC)    Assessment and Plan    Severe malnutrition  Nutrition Problem:  Severe Protein-Calorie Malnutrition  Malnutrition in the context of Chronic Illness/Injury     Related to (etiology):  Inadequate nutrient intake     Signs and Symptoms (as evidenced by):  Energy Intake: <75% of estimated energy requirement for >1 month  Weight Loss: 28% x 5 months      Interventions(treatment strategy):  Collaboration with other providers  ONS     Nutrition Diagnosis Status:  Continue    Malnutrition Assessment  Weight Loss (Malnutrition):  (28% x 5 months)  Energy Intake (Malnutrition): less than 75% for greater than or equal to 1 month     Reason for Assessment    Reason For Assessment: consult  Diagnosis:  (Pseudoaneurysm of right femoral artery)  Relevant Medical History: HF, LVAD  Interdisciplinary Rounds: did not attend    General Information Comments:   10/3: RD consulted for malnutrition. Tolerating 25% of meals per chart. Pt agreeable to Boost Plus during last RD visits. Had BM yesterday. Continue to meet criteria for severe malnutrition.     9/29: S/p ileofemoral bypass and muscle flap (9/27). LVAD present. Pt reports decreased appetite since sx. Tolerating >50% of meals per pt. Agreeable to Boost Plus (chocolate). Denies N/V, chewing/swallowing difficulties. Stated  lb. Per wt hx, noted significant wt loss of 72 lb (28%) x 5 month. Pt refuse NFPE today. Pt meet the criteria for severe malnutrition in the context of chronic illness.     Nutrition Discharge Planning: Cardiac diet    Nutrition Risk Screen    Nutrition Risk Screen: no indicators present    Nutrition/Diet  "History    Spiritual, Cultural Beliefs, Mu-ism Practices, Values that Affect Care: no    Anthropometrics    Temp: 97.8 °F (36.6 °C)  Height Method: Stated  Height: 6' 1" (185.4 cm)  Height (inches): 73 in  Weight Method: Bed Scale  Weight: 89.9 kg (198 lb 3.1 oz)  Weight (lb): 198.2 lb  Ideal Body Weight (IBW), Male: 184 lb  % Ideal Body Weight, Male (lb): 108.08 %  BMI (Calculated): 26.2     Lab/Procedures/Meds    Pertinent Labs Reviewed: reviewed  Pertinent Labs Comments: Na 134, glucose 64  Pertinent Medications Reviewed: reviewed  Pertinent Medications Comments: coumadin, Mg oxide, pantoprazole, mirtazapine    Estimated/Assessed Needs    Weight Used For Calorie Calculations: 82.1 kg (181 lb)  Energy Calorie Requirements (kcal): 2050 kcal  Energy Need Method: [place]-St Jeor (PAL 1.2)  Protein Requirements: 82-123g (1-1.2g/kg)  Weight Used For Protein Calculations: 82.1 kg (181 lb)  Fluid Requirements (mL): 1ml/kcal or per MD  Estimated Fluid Requirement Method: RDA Method  RDA Method (mL): 2050     Nutrition Prescription Ordered    Current Diet Order: Cardiac    Evaluation of Received Nutrient/Fluid Intake    I/O: +3.2L since admit  Energy Calories Required: not meeting needs  Protein Required: not meeting needs  Comments: LBM 10/2  Tolerance: tolerating    Nutrition Risk    Level of Risk/Frequency of Follow-up: low     Monitor and Evaluation    Food and Nutrient Intake: energy intake, food and beverage intake  Food and Nutrient Adminstration: diet order  Physical Activity and Function: nutrition-related ADLs and IADLs  Anthropometric Measurements: height/length, weight, weight change, body mass index  Biochemical Data, Medical Tests and Procedures: electrolyte and renal panel, gastrointestinal profile, glucose/endocrine profile, inflammatory profile, lipid profile  Nutrition-Focused Physical Findings: overall appearance     Nutrition Follow-Up    RD Follow-up?: Yes    Curry DIEGO    "

## 2022-10-03 NOTE — PROGRESS NOTES
[facility] - [facility]iology Stepdown  Heart Transplant  Progress Note    Patient Name: Tim Richards  MRN: [id number]  Admission Date: 9/24/2022  Hospital Length of Stay: 9 days  Attending Physician: Roslyn Ragsdale DO  Primary Care Provider: Deyanira Booth MD  Principal Problem:Pseudoaneurysm of right femoral artery    Subjective:     **Interval History: Post op day 6. Still doing well without any evidence of bleeding. INR is up to 1.5.     Dispo pending therapeutic INR of at least 1.8 and removal of trach with Dr. Holt.    Continuous Infusions:   heparin (porcine) in D5W 10 Units/kg/hr (10/03/22 1058)     Scheduled Meds:   acetaminophen  1,000 mg Oral TID    amiodarone  400 mg Oral Daily    ertapenem (INVANZ) IVPB  1 g Intravenous Q24H    levothyroxine  50 mcg Oral Before breakfast    magnesium oxide  400 mg Oral Daily with lunch    mexiletine  250 mg Oral Q8H    mirtazapine  30 mg Oral QHS    pantoprazole  40 mg Oral Daily with lunch    polyethylene glycol  17 g Oral Daily    sodium chloride 0.9%  10 mL Intravenous Q6H    warfarin  5 mg Oral Daily     PRN Meds:sodium chloride, sodium chloride, sodium chloride 0.9%, sodium chloride 0.9%, ALPRAZolam, bisacodyL, ondansetron, oxyCODONE, oxyCODONE, Flushing PICC Protocol **AND** sodium chloride 0.9% **AND** sodium chloride 0.9%, zolpidem    Review of patient's allergies indicates:   Allergen Reactions    Lisinopril Anaphylaxis    Hydralazine analogues      Chronic constipation, impotence, dizziness     Objective:     Vital Signs (Most Recent):  Temp: 98.6 °F (37 °C) (10/03/22 1145)  Pulse: 78 (10/03/22 1209)  Resp: 14 (10/03/22 1209)  BP: (!) 64/0 (10/03/22 1209)  SpO2: 96 % (10/03/22 1209)   Vital Signs (24h Range):  Temp:  [97.1 °F (36.2 °C)-98.6 °F (37 °C)] 98.6 °F (37 °C)  Pulse:  [67-80] 78  Resp:  [14-26] 14  SpO2:  [95 %-99 %] 96 %  BP: ()/(0-79) 64/0     Patient Vitals for the past 72 hrs (Last 3 readings):   Weight   10/03/22 0500  89.9 kg (198 lb 3.1 oz)   10/02/22 0455 89.2 kg (196 lb 10.4 oz)   10/01/22 0524 85.8 kg (189 lb 2.5 oz)       Body mass index is 26.15 kg/m².      Intake/Output Summary (Last 24 hours) at 10/3/2022 1330  Last data filed at 10/3/2022 0905  Gross per 24 hour   Intake 340 ml   Output 650 ml   Net -310 ml         Hemodynamic Parameters:       Telemetry: SR    Physical Exam  Constitutional:       Appearance: Normal appearance.   HENT:      Head: Normocephalic and atraumatic.   Eyes:      Extraocular Movements: Extraocular movements intact.      Pupils: Pupils are equal, round, and reactive to light.   Neck:      Comments: Neck veins are not elevated  Cardiovascular:      Rate and Rhythm: Normal rate and regular rhythm.      Comments: Smooth VAD hum  Pulmonary:      Effort: Pulmonary effort is normal.      Breath sounds: Normal breath sounds.   Abdominal:      General: Bowel sounds are normal. There is no distension.      Palpations: Abdomen is soft.      Tenderness: There is no abdominal tenderness.      Comments: DLES dressing c/d/i   Musculoskeletal:         General: No swelling. Normal range of motion.      Cervical back: Normal range of motion and neck supple.      Right lower leg: No edema.      Left lower leg: No edema.      Comments: R groin with wound vac and CLEO drain. No drainage or bleeding.    Skin:     General: Skin is warm and dry.      Capillary Refill: Capillary refill takes 2 to 3 seconds.   Neurological:      General: No focal deficit present.      Mental Status: He is alert and oriented to person, place, and time.   Psychiatric:         Mood and Affect: Mood normal.         Behavior: Behavior normal.         Thought Content: Thought content normal.         Judgment: Judgment normal.       Significant Labs:  CBC:  Recent Labs   Lab 10/02/22  1631 10/03/22  0513 10/03/22  1102   WBC 9.29 6.84 7.58   RBC 2.85* 2.87* 3.02*   HGB 7.7* 7.7* 8.1*   HCT 26.5* 26.6* 27.8*    275 318   MCV 93 93 92   MCH  27.0 26.8* 26.8*   MCHC 29.1* 28.9* 29.1*       BNP:  Recent Labs   Lab 09/28/22  0332 09/30/22  0546 10/03/22  0513   * 179* 291*       CMP:  Recent Labs   Lab 10/01/22  0507 10/02/22  0454 10/03/22  0513   GLU 70 87 64*   CALCIUM 8.6* 8.6* 8.4*   ALBUMIN 1.9* 1.9* 1.8*   PROT 5.5* 5.6* 5.3*   * 135* 134*   K 3.6 4.1 3.7   CO2 25 24 25    105 103   BUN 12 12 12   CREATININE 0.9 1.3 1.1   ALKPHOS 76 83 85   ALT 11 20 20   AST 20 38 34   BILITOT 0.4 0.4 0.4        Coagulation:   Recent Labs   Lab 10/01/22  0507 10/02/22  0454 10/03/22  0513   INR 1.1 1.3* 1.5*   APTT 42.2* 44.2* 54.1*       LDH:  Recent Labs   Lab 10/01/22  0507 10/02/22  0454 10/03/22  0513    282* 238       Microbiology:  Microbiology Results (last 7 days)       Procedure Component Value Units Date/Time    Culture, Anaerobe [[id number]] Collected: 09/27/22 0944    Order Status: Completed Specimen: Wound from Groin Updated: 10/03/22 1000     Anaerobic Culture Culture in progress    Narrative:      4. FEMORAL TISSUE RIGHT GROIN    Culture, Anaerobe [[id number]] Collected: 09/27/22 0944    Order Status: Completed Specimen: Wound from Groin Updated: 10/03/22 1000     Anaerobic Culture Culture in progress    Narrative:      3. RIGHT FEMORAL GRAFT    Culture, Anaerobe [[id number]] Collected: 09/27/22 0944    Order Status: Completed Specimen: Wound from Groin Updated: 10/03/22 0959     Anaerobic Culture No anaerobes isolated    Narrative:      2. RIGHT FEMORAL ARTERY    Culture, Anaerobe [[id number]] Collected: 09/27/22 0944    Order Status: Completed Specimen: Wound from Groin Updated: 10/03/22 0959     Anaerobic Culture No anaerobes isolated    Narrative:      1. RIGHT GROIN WOUND    Aerobic culture [[id number]]  (Abnormal)  (Susceptibility) Collected: 09/27/22 0944    Order Status: Completed Specimen: Wound from Groin Updated: 10/02/22 1020     Aerobic Bacterial Culture ESCHERICHIA COLI ESBL  Moderate        PROTEUS  MIRABILIS  Rare      Narrative:      1. RIGHT GROIN WOUND    Blood culture [[id number]] Collected: 09/25/22 0138    Order Status: Completed Specimen: Blood from Peripheral, Hand, Right Updated: 09/30/22 0612     Blood Culture, Routine No growth after 5 days.    Narrative:      Collection has been rescheduled by TR3 at 09/25/2022 00:31 Reason: Pt   dont wanna get stuck rn rachille  Collection has been rescheduled by TR3 at 09/25/2022 00:31 Reason: Pt   dont wanna get stuck rn rachille    Blood culture [[id number]] Collected: 09/25/22 0133    Order Status: Completed Specimen: Blood from Peripheral, Lower Arm, Left Updated: 09/30/22 0612     Blood Culture, Routine No growth after 5 days.    Narrative:      Collection has been rescheduled by TR3 at 09/25/2022 00:31 Reason: Pt   dont wanna get stuck rn rachille  Collection has been rescheduled by TR3 at 09/25/2022 00:31 Reason: Pt   dont wanna get stuck rn rachille    Aerobic culture [[id number]]  (Abnormal)  (Susceptibility) Collected: 09/27/22 0944    Order Status: Completed Specimen: Wound from Groin Updated: 09/29/22 1300     Aerobic Bacterial Culture ESCHERICHIA COLI  Rare      Narrative:      4. FEMORAL TISSUE RIGHT GROIN    Aerobic culture [[id number]]  (Abnormal)  (Susceptibility) Collected: 09/27/22 0944    Order Status: Completed Specimen: Wound from Groin Updated: 09/29/22 1259     Aerobic Bacterial Culture ESCHERICHIA COLI ESBL  Rare      Narrative:      3. RIGHT FEMORAL GRAFT    Aerobic culture [[id number]]  (Abnormal)  (Susceptibility) Collected: 09/27/22 0944    Order Status: Completed Specimen: Wound from Groin Updated: 09/29/22 1248     Aerobic Bacterial Culture CITROBACTER FARMERI  Rare      Narrative:      2. RIGHT FEMORAL ARTERY    Culture, Anaerobe [[id number]] Collected: 09/25/22 0815    Order Status: Completed Specimen: Wound from Groin Updated: 09/29/22 0932     Anaerobic Culture No anaerobes isolated    AFB Culture & Smear [[id number]] Collected:  09/27/22 0944    Order Status: Completed Specimen: Wound from Groin Updated: 09/29/22 0927     AFB Culture & Smear Culture in progress     AFB CULTURE STAIN No acid fast bacilli seen.    Narrative:      1. RIGHT GROIN WOUND    AFB Culture & Smear [[id number]] Collected: 09/27/22 0944    Order Status: Completed Specimen: Wound from Groin Updated: 09/28/22 2127     AFB Culture & Smear Culture in progress     AFB CULTURE STAIN No acid fast bacilli seen.    Narrative:      2. RIGHT FEMORAL ARTERY    AFB Culture & Smear [[id number]] Collected: 09/27/22 0944    Order Status: Completed Specimen: Wound from Groin Updated: 09/28/22 2127     AFB Culture & Smear Culture in progress     AFB CULTURE STAIN No acid fast bacilli seen.    Narrative:      3. RIGHT FEMORAL GRAFT    AFB Culture & Smear [[id number]] Collected: 09/27/22 0944    Order Status: Completed Specimen: Wound from Groin Updated: 09/28/22 2127     AFB Culture & Smear Culture in progress     AFB CULTURE STAIN No acid fast bacilli seen.    Narrative:      4. FEMORAL TISSUE RIGHT GROIN    Gram stain [[id number]] Collected: 09/27/22 0944    Order Status: Completed Specimen: Wound from Groin Updated: 09/27/22 1312     Gram Stain Result Few WBC's      No organisms seen    Narrative:      1. RIGHT GROIN WOUND    Gram stain [[id number]] Collected: 09/27/22 0944    Order Status: Completed Specimen: Wound from Groin Updated: 09/27/22 1204     Gram Stain Result No WBC's      No organisms seen    Narrative:      2. RIGHT FEMORAL ARTERY    Gram stain [[id number]] Collected: 09/27/22 0944    Order Status: Completed Specimen: Wound from Groin Updated: 09/27/22 1203     Gram Stain Result No WBC's      No organisms seen    Narrative:      3. RIGHT FEMORAL GRAFT    Gram stain [[id number]] Collected: 09/27/22 0944    Order Status: Completed Specimen: Wound from Groin Updated: 09/27/22 1202     Gram Stain Result No WBC's      No organisms seen    Narrative:      4. FEMORAL TISSUE RIGHT  GROIN    Fungus culture [[id number]] Collected: 09/27/22 0944    Order Status: Sent Specimen: Wound from Groin Updated: 09/27/22 1036    Fungus culture [[id number]] Collected: 09/27/22 0944    Order Status: Sent Specimen: Wound from Groin Updated: 09/27/22 1034    Fungus culture [[id number]] Collected: 09/27/22 0944    Order Status: Sent Specimen: Wound from Groin Updated: 09/27/22 1032    Fungus culture [[id number]] Collected: 09/27/22 0944    Order Status: Sent Specimen: Wound from Groin Updated: 09/27/22 1029    Aerobic culture [[id number]]  (Abnormal)  (Susceptibility) Collected: 09/25/22 0815    Order Status: Completed Specimen: Wound from Groin Updated: 09/27/22 0945     Aerobic Bacterial Culture ESCHERICHIA COLI ESBL  Few              I have reviewed all pertinent labs within the past 24 hours.    Estimated Creatinine Clearance: 85.8 mL/min (based on SCr of 1.1 mg/dL).    Diagnostic Results:  I have reviewed and interpreted all pertinent imaging results/findings within the past 24 hours.    Assessment and Plan:     Tim Richards is a 56 y.o.  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  His post operative course was complicated by cardiogenic shock/RV failure requiring VA-ECMO.  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  He has a 8 Fr cuffed Shiley trach.  He had a prolonged and complicated hospital course from 6/24/22 - 9/4/22, was discharged to rehab (see clinic visit from 9/22/22 for additional details).  Of note he was last seen in clinic by Dr. Ragsdale on 9/22/22 after being discharged from rehab and at the time was noted to have purulence from the groin site and was placed on doxycycline PO.  He has had complaints of fatigue since his last hospitalization, per his wife.  This morning he was  sitting and was crossing his legs for a prolonged period of time, and when he uncrossed them he noticed a pool of blood under him and active bleeding from his R groin where his previous ECMO cannula was.  He began to feel lightheaded when he tried walking.  Per wife he had 2 low flow alarms at home.      In the ED, patient stated he felt at his baseline and had no complaints.  No active bleeding at the time of my evaluation from R groin site.  Doppler BP was 80/0.  Hgb dropped from 9.4 -> 8.9 over the past 2 days.  INR was 3.0.  CT Angio performed per CV surgery recommendations.  HTS was consulted for admission, CT surgery was consulted in the ED for evaluation of groin hematoma.       Cardiovascular Studies  TTE 8/30/22   There is an LVAD present. Base speed is 6300 RPMs. The pump type is a Heartmate III. The interventricular septum appears midline. The aortic valve does not open.   The left ventricle is severely enlarged with eccentric hypertrophy and severely decreased systolic function.   The estimated ejection fraction is 10%.   There is left ventricular global hypokinesis.   Left ventricular diastolic dysfunction.   There is trivial continuous aortic regurgitation.   There is abnormal septal wall motion.   The right ventricle is poorly seen, but appears enlarged with reduced systolic function.   Mild tricuspid regurgitation.      * Pseudoaneurysm of right femoral artery  -Vascular surgery consulted, appreciate recommendations  -CTA shows 3 new pseudoaneurysms of R femoral artery  -S/p  Right Ileofemoral bypass 9/27/22 (end to end). Distal External Iliac to distal femoral artery  -Wound vac in place     Tracheostomy present  Consult placed to gen surg. Dr landin to decannulate his trach ahead of d/c.     Groin hematoma  -Was on VA-ECMO at last hospitalization with successful decannulation  -CTA revealed 3 possible new pseudoaneurysms  -INR 3 on admission, since reversed with PO vit K and FFP.  Holding  A/C until further instruction from surgical team regarding resumption of A/C.   -Hgb was trending down since admit. Transfusing to keep Hgb > 10  -Cultures from right groin wound with ESBL E Coli, per ID hold dapto and continue on ertapenem for total of 6 weeks.     VT (ventricular tachycardia)  -Patient has [employer] SICD  -Currently on amiodarone 400mg qd and mexiletine 250mg TID, will continue     LVAD (left ventricular assist device) present  -S/P S/P DT HM2 3/8/2018 then S/P DT HM3 exchange 7/13/2022 2/2 to thrombosis of HM2 thought caused by COVID PNA  -Current speed 6400  -Last TTE done 8/30/2022 at 6300: LVEDD 7.44, LVEF 10%, RV appears enlarged and decreased, trace AI, trace MR, mild TR, PAS > 28, IVC 3, AV does not open, septum midline  -restarted coumadin post op and minimally intense heparin gtt. Goal INR 2.0-3.0.  Subtherapeutic today.  Previous home dose was 5mg Qdaily   -LDH is stable    Procedure: Device Interrogation Including analysis of device parameters  Current Settings: Ventricular Assist Device  Review of device function is stable    TXP LVAD INTERROGATIONS 10/3/2022 10/3/2022 10/3/2022 10/3/2022 10/2/2022 10/2/2022 10/2/2022   Type HeartMate3 HeartMate3 HeartMate3 HeartMate3 HeartMate3 HeartMate3 HeartMate3   Flow 5.1 5.5 5.4 5.6 5.6 5.6 5.6   Speed 6400 6400 6400 6400 6400 6400 6400   PI 3.4 2.2 2 1.9 1.5 2.2 2.6   Power ([place]) 5.4 5.5 5.5 5.6 5.5 5.5 5.5   LSL - 6000 6000 6000 6000 6000 6000   Low Flow Alarm - - - - - - -   High Power Alarm - - - - - - -   Pulsatility - - - - - Intermittent pulse Intermittent pulse         Hypertension  -BP controlled    Chronic combined systolic and diastolic congestive heart failure  -NICM   -Echo 8/30/22 EF: 10%, LVEDD: 7.44  -Patient was on Torsemide prior to admit: 40mg Qdaily   -GDMT: N/A  -Euvolemic on exam,   -Maintain K>4.0, Mg>2.0        YANET Kelely-C  Heart Transplant  [facility] - Cardiology Stepdown

## 2022-10-03 NOTE — SUBJECTIVE & OBJECTIVE
Medications:  Continuous Infusions:   heparin (porcine) in D5W 12 Units/kg/hr (10/02/22 0836)     Scheduled Meds:   acetaminophen  1,000 mg Oral TID    amiodarone  400 mg Oral Daily    ertapenem (INVANZ) IVPB  1 g Intravenous Q24H    levothyroxine  50 mcg Oral Before breakfast    magnesium oxide  400 mg Oral Daily with lunch    mexiletine  250 mg Oral Q8H    mirtazapine  30 mg Oral QHS    pantoprazole  40 mg Oral Daily with lunch    polyethylene glycol  17 g Oral Daily    sodium chloride 0.9%  10 mL Intravenous Q6H    warfarin  5 mg Oral Daily     PRN Meds:sodium chloride, sodium chloride, sodium chloride 0.9%, sodium chloride 0.9%, ALPRAZolam, bisacodyL, ondansetron, oxyCODONE, oxyCODONE, Flushing PICC Protocol **AND** sodium chloride 0.9% **AND** sodium chloride 0.9%, zolpidem     Objective:     Vital Signs (Most Recent):  Temp: 97.3 °F (36.3 °C) (10/03/22 0500)  Pulse: 73 (10/03/22 0604)  Resp: 16 (10/03/22 0500)  BP: 99/79 (10/03/22 0506)  SpO2: 96 % (10/03/22 0500) Vital Signs (24h Range):  Temp:  [97.1 °F (36.2 °C)-98.4 °F (36.9 °C)] 97.3 °F (36.3 °C)  Pulse:  [67-76] 73  Resp:  [16-26] 16  SpO2:  [94 %-99 %] 96 %  BP: ()/(0-79) 99/79         Physical Exam  Constitutional:       Appearance: Normal appearance.   HENT:      Head: Normocephalic and atraumatic.      Mouth/Throat:      Mouth: Mucous membranes are moist.   Eyes:      Pupils: Pupils are equal, round, and reactive to light.   Cardiovascular:      Rate and Rhythm: Regular rhythm.   Abdominal:      General: Abdomen is flat.      Palpations: Abdomen is soft.   Musculoskeletal:      Comments: 5/5 strength all 4 extremities, biphasic waveforms DP bilaterally  Right groin area with wound vac and CLEO drain   Neurological:      Mental Status: He is alert.       Significant Labs:  ABGs: No results for input(s): PH, PCO2, PO2, HCO3, POCSATURATED, BE in the last 48 hours.  Cardiac markers: No results for input(s): CKMB, CPKMB, TROPONINT, TROPONINI,  MYOGLOBIN in the last 48 hours.  CBC:   Recent Labs   Lab 10/03/22  0513   WBC 6.84   RBC 2.87*   HGB 7.7*   HCT 26.6*      MCV 93   MCH 26.8*   MCHC 28.9*       CMP:   Recent Labs   Lab 10/03/22  0513   GLU 64*   CALCIUM 8.4*   ALBUMIN 1.8*   PROT 5.3*   *   K 3.7   CO2 25      BUN 12   CREATININE 1.1   ALKPHOS 85   ALT 20   AST 34   BILITOT 0.4       Coagulation:   Recent Labs   Lab 10/02/22  0454 10/03/22  0513   LABPROT 13.4*  --    INR 1.3*  --    APTT 44.2* 54.1*       Lactic Acid: No results for input(s): LACTATE in the last 48 hours.  All pertinent labs from the last 24 hours have been reviewed.    Significant Diagnostics:  I have reviewed all pertinent imaging results/findings within the past 24 hours.

## 2022-10-03 NOTE — PT/OT/SLP PROGRESS
Physical Therapy Co-Treatment    Patient Name:  Tim Richards   MRN:  [id number]    Recommendations:     Discharge Recommendations:  home health PT   Discharge Equipment Recommendations: none   Barriers to discharge: decreased functional mobility, fall risk, decreased caregiver support, and inaccessible home    Assessment:     Tim Richards is a 56 y.o. male admitted with a medical diagnosis of Pseudoaneurysm of right femoral artery.  Pt demonstrates the below listed impairments with decreased tolerance to functional mobility, pain, and gait instability being the most limiting.  Pt demonstrates fair tolerance to out of bed mobility and is willing to ambulate to the bathroom.  Pt more motivated this day.  Pt requires skilled PT due to patient's status as: a fall risk and patient has increased pain to allow safe d/c home.     Impairments and functional limitations:  weakness, impaired endurance, impaired self care skills, impaired functional mobility, gait instability, impaired balance, decreased upper extremity function, decreased lower extremity function, pain, decreased ROM, impaired skin, impaired cardiopulmonary response to activity.  These deficits affect their roles and responsibilities in which they were able to complete prior to admit.  Rehab Prognosis:   Good ; patient would benefit from acute skilled PT services 4 x/week to address these deficits, improve quality of life, focus on recovery of impairments, provide patient/caregiver education, reduce fall risk, and reach maximum level of function.  Pt is moderately motivated to participated in skilled PT.    Recent Surgery:   Procedure(s) (LRB):  CREATION, BYPASS, ARTERIAL, ILIAC TO FEMORAL WITH GRAFT (Right)  CREATION, FLAP, MUSCLE ROTATION, SARTORIUS AND RECTUS FEMORIS (Right)  APPLICATION, WOUND VAC (Right) 6 Days Post-Op    Plan:     During this hospitalization, patient to be seen 4 x/week to address the identified rehab impairments via gait  "training, therapeutic activities, therapeutic exercises, neuromuscular re-education and progress toward the following goals:    Plan of Care Expires:  10/30/22    Subjective     Chief Complaint: "I need to use the restroom"  Patient/Family Comments/Goals: Return home  Pain/Comfort:  Pain Rating 1: other (see comments) (not rated)  Location - Side 1: Right  Location - Orientation 1: generalized  Location 1: thigh  Pain Addressed 1: Reposition, Distraction, Cessation of Activity  Pain Rating Post-Intervention 1: other (see comments) (not rated)    Objective:     Communicated with RN prior to session.  Patient found HOB elevated with LVAD, CLEO drain, wound vac, peripheral IV, telemetry upon PT entry to room.     General Precautions: Standard, fall, LVAD   Orthopedic Precautions:N/A   Braces: N/A  Oxygen Device:      Functional Mobility:  Bed Mobility:  Rolling Left: SBA  Scooting: SBA  Supine to Sit: SBA  Head of bed position: HOB elevated    Transfers:  Sit to Stand: Min A with RW and with cues for hand placement and foot placement  Toilet transfer: Mod A with RW and with cues for hand placement and foot placement using L grab bar    Gait: Patient ambulated 12' and 30' with RW and CGA. Patient demonstrates occasional unsteady gait, decreased step length, decreased weight shift, flexed posture, and decreased arminda. All lines remained intact throughout ambulation trial.  Stays on wall power  Reciprocal gait pattern present this session     Balance:   Position Score Time   Static Sitting GOOD-: Takes MODERATE challenges but inconstantly  n/a   Dynamic Sitting FAIR+: Maintains balance through MINIMAL excursions of active trunk motion n/a   Static Standing FAIR: Maintains without assist, but is unable to take any challenges n/a   Dynamic Standing FAIR-: Maintains without assist but is inconsistent n/a       AM-PAC 6 CLICK MOBILITY  Turning over in bed (including adjusting bedclothes, sheets and blankets)?: 3  Sitting down " on and standing up from a chair with arms (e.g., wheelchair, bedside commode, etc.): 2  Moving from lying on back to sitting on the side of the bed?: 3  Moving to and from a bed to a chair (including a wheelchair)?: 3  Need to walk in hospital room?: 3  Climbing 3-5 steps with a railing?: 2  Basic Mobility Total Score: 16     Therapeutic Activities:  Patient educated on role of acute care PT and PT POC, safety while in hospital including calling nurse for mobility, call light usage, benefits of out of bed mobility, walker management, breathing technique, fall risk, bed mobility , transfers, gait technique, positioning, posture, risks of prolonged bed rest, possible discharge disposition , and benefits of continued PT by explanation and demonstration.    Patient demonstrates fair understanding of education provided this day.   Whiteboard updated    Therapeutic Exercises:  n/a    Patient left up in chair with all lines intact, call button in reach, and RN notified.    GOALS:   Multidisciplinary Problems       Physical Therapy Goals          Problem: Physical Therapy    Goal Priority Disciplines Outcome Goal Variances Interventions   Physical Therapy Goal     PT, PT/OT Ongoing, Progressing     Description: Goals to be met by: 10/14/2022     Patient will increase functional independence with mobility by performin. Supine to sit with independence  2. Sit to supine with independence  3. Sit to stand transfer with supervision  4. Gait  x 200  feet with supervision using LRAD as needed  5. Ascend/descend 4 stair with no handrails stand by assistance using LRAD as needed  6. Lower extremity exercise program x10 reps per handout, with independence                        Time Tracking:     PT Received On: 10/03/22  PT Start Time: 1009     PT Stop Time: 1035  PT Total Time (min): 26 min     Billable Minutes: Gait Training 18 and Therapeutic Activity 8    Treatment Type: Treatment  PT/PTA: PT     PTA Visit Number: 0      10/03/2022    Co-treatment performed for this visit due to patient need for two skilled therapists to ensure patient and staff safety, to accommodate for patient activity tolerance/pain management, and maximize functional potential.

## 2022-10-03 NOTE — PT/OT/SLP PROGRESS
"Occupational Therapy  Co- Treatment    Name: Tim Richards  MRN: [id number]  Admitting Diagnosis:  Pseudoaneurysm of right femoral artery  6 Days Post-Op    Recommendations:     Discharge Recommendations: home with home health    Assessment:     Tim Richards is a 56 y.o. male with a medical diagnosis of Pseudoaneurysm of right femoral artery. Performance deficits affecting function are weakness, impaired endurance, impaired self care skills, impaired functional mobility, gait instability, impaired balance, pain. Pt tolerated session well with good effort and performance.     Rehab Prognosis:  Good; patient would benefit from acute skilled OT services to address these deficits and reach maximum level of function.       Plan:     Patient to be seen 4 x/week to address the above listed problems via self-care/home management, therapeutic activities, therapeutic exercises  Plan of Care Expires:    Plan of Care Reviewed with: patient    Subjective   Pt agreeable to therapy.   "I did not get up because therapy did not come" pt states.     Pain/Comfort:  Location - Side 1: Right  Location 1: groin  Pain Addressed 1: Reposition, Distraction    Objective:     Communicated with: [email or web address] prior to session.  Patient found in bed with LVAD to wall power, IV, wound vac and CLEO drain.   Co-tx completed this date to optimize functional performance and safety given impaired tolerance for activity     General Precautions: Standard, fall, LVAD     Occupational Performance:     Bed Mobility:    Supine>sit with SBA     Functional Mobility/Transfers:  SIt>stand with MIN A from bed  Sit>stand with MOD A from commode with grab bar     Activities of Daily Living:  Feeding: independent  G/H; standing with SBA   LE dressing: MOD A   Toileting: MIN A     AMPA 6 Click ADL: 18    Treatment & Education:  Pt completed functional mobility in room with RW and CGA. Pt tolerated standing oral care with SBA for approx 4 min without LOB or SOB noted. "   Pt completed toileting on standard commode as stated above      Education provided re: importance of continued functional mobility/ADl skills. Education provided to pt re: to call for assist from nsg for OOB to chair daily and performance of ADL skills including standing oral care and use of standard commode. Education provided to him that he does not have to wait for therapy to mobilize. OT also spoke with nsg re: pt's functional performance and nsg agreeable to assist as needed.   Pts functional performance level placed on board in room to assist with communication.         Patient left up in chair with all lines intact, call button in reach, and nsg notified    GOALS:   Multidisciplinary Problems       Occupational Therapy Goals          Problem: Occupational Therapy    Goal Priority Disciplines Outcome Interventions   Occupational Therapy Goal     OT, PT/OT Ongoing, Progressing    Description: Goals to be met by: 14 days 10/14/22     Patient will increase functional independence with ADLs by performing:    Pt to complete UE dressing with set-up  Pt to complete LE dressing with MIN A with AE as needed  Pt to complete toileting with SBA  Pt to complete t/f commode with SBA  Pt to complete standing g/h skills with supervision.                        Time Tracking:     OT Date of Treatment: 10/03/22  OT Start Time: 1009  OT Stop Time: 1035  OT Total Time (min): 26 min    Billable Minutes:Self Care/Home Management 26    OT/CARY: OT          10/3/2022

## 2022-10-03 NOTE — PLAN OF CARE
Recommendations     1. Continue Cardiac diet       2. Add Boost Plus TID (chocolate) to optimize nutrient intake      3. RD to monitor and follow    Goals: Meet % EEN, EPN by RD f/u  Nutrition Goal Status: progressing towards goal  Communication of RD Recs:  (POC)

## 2022-10-03 NOTE — SUBJECTIVE & OBJECTIVE
**Interval History: Post op day 6. Still doing well without any evidence of bleeding. INR is up to 1.5.     Dispo pending therapeutic INR of at least 1.8 and removal of trach with Dr. Holt.    Continuous Infusions:   heparin (porcine) in D5W 10 Units/kg/hr (10/03/22 1058)     Scheduled Meds:   acetaminophen  1,000 mg Oral TID    amiodarone  400 mg Oral Daily    ertapenem (INVANZ) IVPB  1 g Intravenous Q24H    levothyroxine  50 mcg Oral Before breakfast    magnesium oxide  400 mg Oral Daily with lunch    mexiletine  250 mg Oral Q8H    mirtazapine  30 mg Oral QHS    pantoprazole  40 mg Oral Daily with lunch    polyethylene glycol  17 g Oral Daily    sodium chloride 0.9%  10 mL Intravenous Q6H    warfarin  5 mg Oral Daily     PRN Meds:sodium chloride, sodium chloride, sodium chloride 0.9%, sodium chloride 0.9%, ALPRAZolam, bisacodyL, ondansetron, oxyCODONE, oxyCODONE, Flushing PICC Protocol **AND** sodium chloride 0.9% **AND** sodium chloride 0.9%, zolpidem    Review of patient's allergies indicates:   Allergen Reactions    Lisinopril Anaphylaxis    Hydralazine analogues      Chronic constipation, impotence, dizziness     Objective:     Vital Signs (Most Recent):  Temp: 98.6 °F (37 °C) (10/03/22 1145)  Pulse: 78 (10/03/22 1209)  Resp: 14 (10/03/22 1209)  BP: (!) 64/0 (10/03/22 1209)  SpO2: 96 % (10/03/22 1209)   Vital Signs (24h Range):  Temp:  [97.1 °F (36.2 °C)-98.6 °F (37 °C)] 98.6 °F (37 °C)  Pulse:  [67-80] 78  Resp:  [14-26] 14  SpO2:  [95 %-99 %] 96 %  BP: ()/(0-79) 64/0     Patient Vitals for the past 72 hrs (Last 3 readings):   Weight   10/03/22 0500 89.9 kg (198 lb 3.1 oz)   10/02/22 0455 89.2 kg (196 lb 10.4 oz)   10/01/22 0524 85.8 kg (189 lb 2.5 oz)       Body mass index is 26.15 kg/m².      Intake/Output Summary (Last 24 hours) at 10/3/2022 1330  Last data filed at 10/3/2022 0905  Gross per 24 hour   Intake 340 ml   Output 650 ml   Net -310 ml         Hemodynamic Parameters:       Telemetry:  SR    Physical Exam  Constitutional:       Appearance: Normal appearance.   HENT:      Head: Normocephalic and atraumatic.   Eyes:      Extraocular Movements: Extraocular movements intact.      Pupils: Pupils are equal, round, and reactive to light.   Neck:      Comments: Neck veins are not elevated  Cardiovascular:      Rate and Rhythm: Normal rate and regular rhythm.      Comments: Smooth VAD hum  Pulmonary:      Effort: Pulmonary effort is normal.      Breath sounds: Normal breath sounds.   Abdominal:      General: Bowel sounds are normal. There is no distension.      Palpations: Abdomen is soft.      Tenderness: There is no abdominal tenderness.      Comments: DLES dressing c/d/i   Musculoskeletal:         General: No swelling. Normal range of motion.      Cervical back: Normal range of motion and neck supple.      Right lower leg: No edema.      Left lower leg: No edema.      Comments: R groin with wound vac and CLEO drain. No drainage or bleeding.    Skin:     General: Skin is warm and dry.      Capillary Refill: Capillary refill takes 2 to 3 seconds.   Neurological:      General: No focal deficit present.      Mental Status: He is alert and oriented to person, place, and time.   Psychiatric:         Mood and Affect: Mood normal.         Behavior: Behavior normal.         Thought Content: Thought content normal.         Judgment: Judgment normal.       Significant Labs:  CBC:  Recent Labs   Lab 10/02/22  1631 10/03/22  0513 10/03/22  1102   WBC 9.29 6.84 7.58   RBC 2.85* 2.87* 3.02*   HGB 7.7* 7.7* 8.1*   HCT 26.5* 26.6* 27.8*    275 318   MCV 93 93 92   MCH 27.0 26.8* 26.8*   MCHC 29.1* 28.9* 29.1*       BNP:  Recent Labs   Lab 09/28/22  0332 09/30/22  0546 10/03/22  0513   * 179* 291*       CMP:  Recent Labs   Lab 10/01/22  0507 10/02/22  0454 10/03/22  0513   GLU 70 87 64*   CALCIUM 8.6* 8.6* 8.4*   ALBUMIN 1.9* 1.9* 1.8*   PROT 5.5* 5.6* 5.3*   * 135* 134*   K 3.6 4.1 3.7   CO2 25 24 25     105 103   BUN 12 12 12   CREATININE 0.9 1.3 1.1   ALKPHOS 76 83 85   ALT 11 20 20   AST 20 38 34   BILITOT 0.4 0.4 0.4        Coagulation:   Recent Labs   Lab 10/01/22  0507 10/02/22  0454 10/03/22  0513   INR 1.1 1.3* 1.5*   APTT 42.2* 44.2* 54.1*       LDH:  Recent Labs   Lab 10/01/22  0507 10/02/22  0454 10/03/22  0513    282* 238       Microbiology:  Microbiology Results (last 7 days)       Procedure Component Value Units Date/Time    Culture, Anaerobe [[id number]] Collected: 09/27/22 0944    Order Status: Completed Specimen: Wound from Groin Updated: 10/03/22 1000     Anaerobic Culture Culture in progress    Narrative:      4. FEMORAL TISSUE RIGHT GROIN    Culture, Anaerobe [[id number]] Collected: 09/27/22 0944    Order Status: Completed Specimen: Wound from Groin Updated: 10/03/22 1000     Anaerobic Culture Culture in progress    Narrative:      3. RIGHT FEMORAL GRAFT    Culture, Anaerobe [[id number]] Collected: 09/27/22 0944    Order Status: Completed Specimen: Wound from Groin Updated: 10/03/22 0959     Anaerobic Culture No anaerobes isolated    Narrative:      2. RIGHT FEMORAL ARTERY    Culture, Anaerobe [[id number]] Collected: 09/27/22 0944    Order Status: Completed Specimen: Wound from Groin Updated: 10/03/22 0959     Anaerobic Culture No anaerobes isolated    Narrative:      1. RIGHT GROIN WOUND    Aerobic culture [[id number]]  (Abnormal)  (Susceptibility) Collected: 09/27/22 0944    Order Status: Completed Specimen: Wound from Groin Updated: 10/02/22 1020     Aerobic Bacterial Culture ESCHERICHIA COLI ESBL  Moderate        PROTEUS MIRABILIS  Rare      Narrative:      1. RIGHT GROIN WOUND    Blood culture [[id number]] Collected: 09/25/22 0138    Order Status: Completed Specimen: Blood from Peripheral, Hand, Right Updated: 09/30/22 0612     Blood Culture, Routine No growth after 5 days.    Narrative:      Collection has been rescheduled by TR3 at 09/25/2022 00:31 Reason: Pt   dont wanna get  stuck rn sharona  Collection has been rescheduled by TR3 at 09/25/2022 00:31 Reason: Pt   dont wanna get stuck rn sharona    Blood culture [[id number]] Collected: 09/25/22 0133    Order Status: Completed Specimen: Blood from Peripheral, Lower Arm, Left Updated: 09/30/22 0612     Blood Culture, Routine No growth after 5 days.    Narrative:      Collection has been rescheduled by TR3 at 09/25/2022 00:31 Reason: Pt   dont wanna get stuck rn sharona  Collection has been rescheduled by TR3 at 09/25/2022 00:31 Reason: Pt   dont randell get stuck rn sharona    Aerobic culture [[id number]]  (Abnormal)  (Susceptibility) Collected: 09/27/22 0944    Order Status: Completed Specimen: Wound from Groin Updated: 09/29/22 1300     Aerobic Bacterial Culture ESCHERICHIA COLI  Rare      Narrative:      4. FEMORAL TISSUE RIGHT GROIN    Aerobic culture [[id number]]  (Abnormal)  (Susceptibility) Collected: 09/27/22 0944    Order Status: Completed Specimen: Wound from Groin Updated: 09/29/22 1259     Aerobic Bacterial Culture ESCHERICHIA COLI ESBL  Rare      Narrative:      3. RIGHT FEMORAL GRAFT    Aerobic culture [[id number]]  (Abnormal)  (Susceptibility) Collected: 09/27/22 0944    Order Status: Completed Specimen: Wound from Groin Updated: 09/29/22 1248     Aerobic Bacterial Culture CITROBACTER FARMERI  Rare      Narrative:      2. RIGHT FEMORAL ARTERY    Culture, Anaerobe [[id number]] Collected: 09/25/22 0815    Order Status: Completed Specimen: Wound from Groin Updated: 09/29/22 0932     Anaerobic Culture No anaerobes isolated    AFB Culture & Smear [[id number]] Collected: 09/27/22 0944    Order Status: Completed Specimen: Wound from Groin Updated: 09/29/22 0927     AFB Culture & Smear Culture in progress     AFB CULTURE STAIN No acid fast bacilli seen.    Narrative:      1. RIGHT GROIN WOUND    AFB Culture & Smear [[id number]] Collected: 09/27/22 0944    Order Status: Completed Specimen: Wound from Groin Updated: 09/28/22 2127     AFB  Culture & Smear Culture in progress     AFB CULTURE STAIN No acid fast bacilli seen.    Narrative:      2. RIGHT FEMORAL ARTERY    AFB Culture & Smear [[id number]] Collected: 09/27/22 0944    Order Status: Completed Specimen: Wound from Groin Updated: 09/28/22 2127     AFB Culture & Smear Culture in progress     AFB CULTURE STAIN No acid fast bacilli seen.    Narrative:      3. RIGHT FEMORAL GRAFT    AFB Culture & Smear [[id number]] Collected: 09/27/22 0944    Order Status: Completed Specimen: Wound from Groin Updated: 09/28/22 2127     AFB Culture & Smear Culture in progress     AFB CULTURE STAIN No acid fast bacilli seen.    Narrative:      4. FEMORAL TISSUE RIGHT GROIN    Gram stain [[id number]] Collected: 09/27/22 0944    Order Status: Completed Specimen: Wound from Groin Updated: 09/27/22 1312     Gram Stain Result Few WBC's      No organisms seen    Narrative:      1. RIGHT GROIN WOUND    Gram stain [[id number]] Collected: 09/27/22 0944    Order Status: Completed Specimen: Wound from Groin Updated: 09/27/22 1204     Gram Stain Result No WBC's      No organisms seen    Narrative:      2. RIGHT FEMORAL ARTERY    Gram stain [[id number]] Collected: 09/27/22 0944    Order Status: Completed Specimen: Wound from Groin Updated: 09/27/22 1203     Gram Stain Result No WBC's      No organisms seen    Narrative:      3. RIGHT FEMORAL GRAFT    Gram stain [[id number]] Collected: 09/27/22 0944    Order Status: Completed Specimen: Wound from Groin Updated: 09/27/22 1202     Gram Stain Result No WBC's      No organisms seen    Narrative:      4. FEMORAL TISSUE RIGHT GROIN    Fungus culture [[id number]] Collected: 09/27/22 0944    Order Status: Sent Specimen: Wound from Groin Updated: 09/27/22 1036    Fungus culture [[id number]] Collected: 09/27/22 0944    Order Status: Sent Specimen: Wound from Groin Updated: 09/27/22 1034    Fungus culture [[id number]] Collected: 09/27/22 0944    Order Status: Sent Specimen: Wound from Groin Updated:  09/27/22 1032    Fungus culture [[id number]] Collected: 09/27/22 0944    Order Status: Sent Specimen: Wound from Groin Updated: 09/27/22 1029    Aerobic culture [[id number]]  (Abnormal)  (Susceptibility) Collected: 09/25/22 0815    Order Status: Completed Specimen: Wound from Groin Updated: 09/27/22 0945     Aerobic Bacterial Culture ESCHERICHIA COLI ESBL  Few              I have reviewed all pertinent labs within the past 24 hours.    Estimated Creatinine Clearance: 85.8 mL/min (based on SCr of 1.1 mg/dL).    Diagnostic Results:  I have reviewed and interpreted all pertinent imaging results/findings within the past 24 hours.

## 2022-10-03 NOTE — PROGRESS NOTES
10/03/22 0053 10/03/22 0100   Vital Signs   BP (!) 112/0 (!) 110/0   BP Location Left arm Left arm   BP Method Doppler Doppler   MD Romero notified and instructed to recheck BP in 30 mins.

## 2022-10-03 NOTE — CONSULTS
General Surgery Care Update    Patient known to general surgery service following tracheostomy placement. Contacted today for trach decannulation.    Tracheostomy removed at bedside without complication. Occlusive dressing placed. Patient tolerated well.      Jessica Maher MD  PGY-3, General Surgery  [facility]

## 2022-10-04 ENCOUNTER — PATIENT MESSAGE (OUTPATIENT)
Dept: TRANSPLANT | Facility: CLINIC | Age: 57
End: 2022-10-04
Payer: COMMERCIAL

## 2022-10-04 LAB
ALBUMIN SERPL BCP-MCNC: 1.9 G/DL (ref 3.5–5.2)
ALP SERPL-CCNC: 81 U/L (ref 55–135)
ALT SERPL W/O P-5'-P-CCNC: 16 U/L (ref 10–44)
ANION GAP SERPL CALC-SCNC: 6 MMOL/L (ref 8–16)
APTT BLDCRRT: 45.3 SEC (ref 21–32)
APTT BLDCRRT: 46.4 SEC (ref 21–32)
AST SERPL-CCNC: 22 U/L (ref 10–40)
BACTERIA SPEC ANAEROBE CULT: NORMAL
BACTERIA SPEC ANAEROBE CULT: NORMAL
BASOPHILS # BLD AUTO: 0.01 K/UL (ref 0–0.2)
BASOPHILS # BLD AUTO: 0.01 K/UL (ref 0–0.2)
BASOPHILS NFR BLD: 0.1 % (ref 0–1.9)
BASOPHILS NFR BLD: 0.1 % (ref 0–1.9)
BILIRUB DIRECT SERPL-MCNC: 0.3 MG/DL (ref 0.1–0.3)
BILIRUB SERPL-MCNC: 0.4 MG/DL (ref 0.1–1)
BUN SERPL-MCNC: 11 MG/DL (ref 6–20)
CALCIUM SERPL-MCNC: 8.5 MG/DL (ref 8.7–10.5)
CHLORIDE SERPL-SCNC: 103 MMOL/L (ref 95–110)
CO2 SERPL-SCNC: 26 MMOL/L (ref 23–29)
CREAT SERPL-MCNC: 1 MG/DL (ref 0.5–1.4)
DIFFERENTIAL METHOD: ABNORMAL
DIFFERENTIAL METHOD: ABNORMAL
EOSINOPHIL # BLD AUTO: 0.3 K/UL (ref 0–0.5)
EOSINOPHIL # BLD AUTO: 0.3 K/UL (ref 0–0.5)
EOSINOPHIL NFR BLD: 4.1 % (ref 0–8)
EOSINOPHIL NFR BLD: 4.6 % (ref 0–8)
ERYTHROCYTE [DISTWIDTH] IN BLOOD BY AUTOMATED COUNT: 14.6 % (ref 11.5–14.5)
ERYTHROCYTE [DISTWIDTH] IN BLOOD BY AUTOMATED COUNT: 14.7 % (ref 11.5–14.5)
EST. GFR  (NO RACE VARIABLE): >60 ML/MIN/1.73 M^2
GLUCOSE SERPL-MCNC: 73 MG/DL (ref 70–110)
HCT VFR BLD AUTO: 25.7 % (ref 40–54)
HCT VFR BLD AUTO: 25.8 % (ref 40–54)
HGB BLD-MCNC: 7.6 G/DL (ref 14–18)
HGB BLD-MCNC: 7.8 G/DL (ref 14–18)
IMM GRANULOCYTES # BLD AUTO: 0.09 K/UL (ref 0–0.04)
IMM GRANULOCYTES # BLD AUTO: 0.12 K/UL (ref 0–0.04)
IMM GRANULOCYTES NFR BLD AUTO: 1.3 % (ref 0–0.5)
IMM GRANULOCYTES NFR BLD AUTO: 1.6 % (ref 0–0.5)
INR PPP: 1.7 (ref 0.8–1.2)
LDH SERPL L TO P-CCNC: 232 U/L (ref 110–260)
LYMPHOCYTES # BLD AUTO: 1 K/UL (ref 1–4.8)
LYMPHOCYTES # BLD AUTO: 1.3 K/UL (ref 1–4.8)
LYMPHOCYTES NFR BLD: 15.4 % (ref 18–48)
LYMPHOCYTES NFR BLD: 17.5 % (ref 18–48)
MAGNESIUM SERPL-MCNC: 2.1 MG/DL (ref 1.6–2.6)
MCH RBC QN AUTO: 26.9 PG (ref 27–31)
MCH RBC QN AUTO: 27.3 PG (ref 27–31)
MCHC RBC AUTO-ENTMCNC: 29.6 G/DL (ref 32–36)
MCHC RBC AUTO-ENTMCNC: 30.2 G/DL (ref 32–36)
MCV RBC AUTO: 90 FL (ref 82–98)
MCV RBC AUTO: 91 FL (ref 82–98)
MONOCYTES # BLD AUTO: 0.8 K/UL (ref 0.3–1)
MONOCYTES # BLD AUTO: 0.9 K/UL (ref 0.3–1)
MONOCYTES NFR BLD: 11.4 % (ref 4–15)
MONOCYTES NFR BLD: 12.1 % (ref 4–15)
NEUTROPHILS # BLD AUTO: 4.5 K/UL (ref 1.8–7.7)
NEUTROPHILS # BLD AUTO: 4.7 K/UL (ref 1.8–7.7)
NEUTROPHILS NFR BLD: 64.6 % (ref 38–73)
NEUTROPHILS NFR BLD: 67.2 % (ref 38–73)
NRBC BLD-RTO: 0 /100 WBC
NRBC BLD-RTO: 0 /100 WBC
PHOSPHATE SERPL-MCNC: 2.9 MG/DL (ref 2.7–4.5)
PLATELET # BLD AUTO: 280 K/UL (ref 150–450)
PLATELET # BLD AUTO: 290 K/UL (ref 150–450)
PMV BLD AUTO: 9.3 FL (ref 9.2–12.9)
PMV BLD AUTO: 9.5 FL (ref 9.2–12.9)
POTASSIUM SERPL-SCNC: 3.8 MMOL/L (ref 3.5–5.1)
PROT SERPL-MCNC: 5.5 G/DL (ref 6–8.4)
PROTHROMBIN TIME: 17.7 SEC (ref 9–12.5)
RBC # BLD AUTO: 2.83 M/UL (ref 4.6–6.2)
RBC # BLD AUTO: 2.86 M/UL (ref 4.6–6.2)
SODIUM SERPL-SCNC: 135 MMOL/L (ref 136–145)
WBC # BLD AUTO: 6.76 K/UL (ref 3.9–12.7)
WBC # BLD AUTO: 7.33 K/UL (ref 3.9–12.7)

## 2022-10-04 PROCEDURE — 27000207 HC ISOLATION

## 2022-10-04 PROCEDURE — 93750 INTERROGATION VAD IN PERSON: CPT | Mod: ,,, | Performed by: INTERNAL MEDICINE

## 2022-10-04 PROCEDURE — 80048 BASIC METABOLIC PNL TOTAL CA: CPT | Performed by: NURSE PRACTITIONER

## 2022-10-04 PROCEDURE — 99233 SBSQ HOSP IP/OBS HIGH 50: CPT | Mod: ,,, | Performed by: PHYSICIAN ASSISTANT

## 2022-10-04 PROCEDURE — 83735 ASSAY OF MAGNESIUM: CPT | Performed by: NURSE PRACTITIONER

## 2022-10-04 PROCEDURE — 99900035 HC TECH TIME PER 15 MIN (STAT)

## 2022-10-04 PROCEDURE — 25000003 PHARM REV CODE 250: Performed by: INTERNAL MEDICINE

## 2022-10-04 PROCEDURE — 25000003 PHARM REV CODE 250: Performed by: PHYSICIAN ASSISTANT

## 2022-10-04 PROCEDURE — 83615 LACTATE (LD) (LDH) ENZYME: CPT | Performed by: NURSE PRACTITIONER

## 2022-10-04 PROCEDURE — 20600001 HC STEP DOWN PRIVATE ROOM

## 2022-10-04 PROCEDURE — 85730 THROMBOPLASTIN TIME PARTIAL: CPT | Performed by: PHYSICIAN ASSISTANT

## 2022-10-04 PROCEDURE — 94761 N-INVAS EAR/PLS OXIMETRY MLT: CPT

## 2022-10-04 PROCEDURE — 63600175 PHARM REV CODE 636 W HCPCS: Performed by: STUDENT IN AN ORGANIZED HEALTH CARE EDUCATION/TRAINING PROGRAM

## 2022-10-04 PROCEDURE — 97607 NEG PRS WND THR NDME<=50SQCM: CPT

## 2022-10-04 PROCEDURE — 25000003 PHARM REV CODE 250: Performed by: NURSE PRACTITIONER

## 2022-10-04 PROCEDURE — 99233 PR SUBSEQUENT HOSPITAL CARE,LEVL III: ICD-10-PCS | Mod: ,,, | Performed by: PHYSICIAN ASSISTANT

## 2022-10-04 PROCEDURE — 85025 COMPLETE CBC W/AUTO DIFF WBC: CPT | Performed by: NURSE PRACTITIONER

## 2022-10-04 PROCEDURE — 25000003 PHARM REV CODE 250: Performed by: STUDENT IN AN ORGANIZED HEALTH CARE EDUCATION/TRAINING PROGRAM

## 2022-10-04 PROCEDURE — 85730 THROMBOPLASTIN TIME PARTIAL: CPT | Mod: 91 | Performed by: INTERNAL MEDICINE

## 2022-10-04 PROCEDURE — 85610 PROTHROMBIN TIME: CPT | Performed by: STUDENT IN AN ORGANIZED HEALTH CARE EDUCATION/TRAINING PROGRAM

## 2022-10-04 PROCEDURE — 84100 ASSAY OF PHOSPHORUS: CPT | Performed by: PHYSICIAN ASSISTANT

## 2022-10-04 PROCEDURE — 93750 PR INTERROGATE VENT ASSIST DEV, IN PERSON, W PHYSICIAN ANALYSIS: ICD-10-PCS | Mod: ,,, | Performed by: INTERNAL MEDICINE

## 2022-10-04 PROCEDURE — A4216 STERILE WATER/SALINE, 10 ML: HCPCS | Performed by: INTERNAL MEDICINE

## 2022-10-04 PROCEDURE — 80076 HEPATIC FUNCTION PANEL: CPT | Performed by: NURSE PRACTITIONER

## 2022-10-04 PROCEDURE — 27000248 HC VAD-ADDITIONAL DAY

## 2022-10-04 RX ORDER — CALCIUM CARBONATE 200(500)MG
500 TABLET,CHEWABLE ORAL EVERY 6 HOURS PRN
Status: DISCONTINUED | OUTPATIENT
Start: 2022-10-04 | End: 2022-10-05 | Stop reason: HOSPADM

## 2022-10-04 RX ORDER — POTASSIUM CHLORIDE 20 MEQ/1
20 TABLET, EXTENDED RELEASE ORAL ONCE
Status: COMPLETED | OUTPATIENT
Start: 2022-10-04 | End: 2022-10-04

## 2022-10-04 RX ORDER — FERROUS GLUCONATE 324(37.5)
324 TABLET ORAL
Status: DISCONTINUED | OUTPATIENT
Start: 2022-10-04 | End: 2022-10-05 | Stop reason: HOSPADM

## 2022-10-04 RX ADMIN — Medication 10 ML: at 05:10

## 2022-10-04 RX ADMIN — ACETAMINOPHEN 1000 MG: 500 TABLET ORAL at 03:10

## 2022-10-04 RX ADMIN — Medication 10 ML: at 12:10

## 2022-10-04 RX ADMIN — CALCIUM CARBONATE (ANTACID) CHEW TAB 500 MG 500 MG: 500 CHEW TAB at 01:10

## 2022-10-04 RX ADMIN — MEXILETINE HYDROCHLORIDE 250 MG: 250 CAPSULE ORAL at 06:10

## 2022-10-04 RX ADMIN — ACETAMINOPHEN 1000 MG: 500 TABLET ORAL at 09:10

## 2022-10-04 RX ADMIN — PANTOPRAZOLE SODIUM 40 MG: 40 TABLET, DELAYED RELEASE ORAL at 12:10

## 2022-10-04 RX ADMIN — POTASSIUM CHLORIDE 20 MEQ: 1500 TABLET, EXTENDED RELEASE ORAL at 09:10

## 2022-10-04 RX ADMIN — CALCIUM CARBONATE (ANTACID) CHEW TAB 500 MG 500 MG: 500 CHEW TAB at 09:10

## 2022-10-04 RX ADMIN — WARFARIN SODIUM 5 MG: 5 TABLET ORAL at 05:10

## 2022-10-04 RX ADMIN — LEVOTHYROXINE SODIUM 50 MCG: 50 TABLET ORAL at 06:10

## 2022-10-04 RX ADMIN — MEXILETINE HYDROCHLORIDE 250 MG: 250 CAPSULE ORAL at 01:10

## 2022-10-04 RX ADMIN — MEXILETINE HYDROCHLORIDE 250 MG: 250 CAPSULE ORAL at 09:10

## 2022-10-04 RX ADMIN — ERTAPENEM 1 G: 1 INJECTION INTRAMUSCULAR; INTRAVENOUS at 12:10

## 2022-10-04 RX ADMIN — Medication 400 MG: at 12:10

## 2022-10-04 RX ADMIN — FERROUS GLUCONATE 324 MG: 324 TABLET ORAL at 12:10

## 2022-10-04 RX ADMIN — ACETAMINOPHEN 1000 MG: 500 TABLET ORAL at 08:10

## 2022-10-04 RX ADMIN — ALPRAZOLAM 1 MG: 0.5 TABLET ORAL at 09:10

## 2022-10-04 RX ADMIN — AMIODARONE HYDROCHLORIDE 400 MG: 200 TABLET ORAL at 08:10

## 2022-10-04 RX ADMIN — Medication 10 ML: at 06:10

## 2022-10-04 RX ADMIN — MIRTAZAPINE 30 MG: 30 TABLET, FILM COATED ORAL at 09:10

## 2022-10-04 NOTE — NURSING
Pt's VAD dressing changed per protocol using kit and sterile technique. Pt's drive line site is clean, white, dry with no drainage; DLES is + 2. No kinks or frays on drive line, secured with sun anchor. Pt tolerated dressing change well. Next dressing change due 10/05/2022.    Pt's old VAD site at Rt abdomen cleansed with soap and water and dressing changed.

## 2022-10-04 NOTE — PLAN OF CARE
[facility]    [facility] educator met with Patient discussed discharge plan for home IVABX. Tim Richards will dc home with family support. Patient will infuse medication via Elastomeric Pump. Patient on S.A.S.H procedure. S.A.S.H mat provided.  Patient education checklist reviewed and acknowledged by above person(s) above and are agreeable to discharge with home infusion plan of care. IV administration process using aspetic technique was reviewed with successful return demonstration. Patient feels comfortable with infusion. Patient will dc home with Ertapenem 1 gm Daily for estimated end of therapy date 11/8/22. Dosing schedule times are 1200. Extension placed to double lumen picc and [facility] will follow for weekly dressing changes and lab draws. Time allotted for questions. Patients nurse and case management team notified teaching has been completed.     Medication delivery will be made to home    Patient accepted to care by ** and report called to [facility]   Phone number [phone]    [facility]  Kati Mendez RN, Clinical Educator  Cell [phone]  Office [phone]  Fax [phone]

## 2022-10-04 NOTE — PROGRESS NOTES
Discharge Planning    Per [facility] Pt will discharge tomorrow 10/5/22 with IV ABX via [facility] (ph: [phone], f: [phone]) and [facility] ([phone]). HTS team is waiting for [employer] W[employer] paperwork to be completed by surgical provider, [employer] Paperwork placed in Pt's bedside chart and YANET Cotto going to give a copy to provider. [facility] LCSW team will need to fax [employer] paperwork once completed. SW providing ongoing psychosocial, counseling, & emotional support, education, resources, assistance, and discharge planning as indicated.  SW to continue to follow.

## 2022-10-04 NOTE — ASSESSMENT & PLAN NOTE
-NICM   -Echo 8/30/22 EF: 10%, LVEDD: 7.44  -Patient was on Torsemide prior to admit: 40mg Qdaily    -GDMT: N/A  -Euvolemic on exam, hold diuresis can discharge with torsemide PRN.   -Maintain K>4.0, Mg>2.0

## 2022-10-04 NOTE — PROGRESS NOTES
10/04/22 0021   Vital Signs   BP (!) 94/0   BP Location Left arm   BP Method Doppler   Pt doppler elevated, asymptomatic, MD Gomez notified and instructed to recheck BP 1 hr later.

## 2022-10-04 NOTE — ASSESSMENT & PLAN NOTE
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 Goal INR 2.0-3.0.  Subtherapeutic today.  Previous home dose was 5mg Qdaily   -LDH is stable    Procedure: Device Interrogation Including analysis of device parameters  Current Settings: Ventricular Assist Device  Review of device function is stable    TXP LVAD INTERROGATIONS 10/4/2022 10/4/2022 10/4/2022 10/3/2022 10/3/2022 10/3/2022 10/3/2022   Type HeartMate3 HeartMate3 HeartMate3 HeartMate3 HeartMate3 HeartMate3 HeartMate3   Flow 5.5 5.4 5.5 5.5 5.4 5.1 5.5   Speed 6400 6400 6400 6400 6400 6400 6400   PI 2.1 2.1 2.2 1.3 2.1 3.4 2.2   Power (Jackson) 5.5 5.5 5.5 5.5 5.5 5.4 5.5   LSL 6000 6000 6000 6000 - - 6000   Low Flow Alarm - - - - - - -   High Power Alarm - - - - - - -   Pulsatility Intermittent pulse - - - - - -

## 2022-10-04 NOTE — SUBJECTIVE & OBJECTIVE
**Interval History: Now a week out from surgery. Pain remains under control, no signs of bleeding. Trach de cannulated yesterday. INR up to 1.7      Continuous Infusions:   heparin (porcine) in D5W 8 Units/kg/hr (10/03/22 2121)     Scheduled Meds:   acetaminophen  1,000 mg Oral TID    amiodarone  400 mg Oral Daily    ertapenem (INVANZ) IVPB  1 g Intravenous Q24H    levothyroxine  50 mcg Oral Before breakfast    magnesium oxide  400 mg Oral Daily with lunch    mexiletine  250 mg Oral Q8H    mirtazapine  30 mg Oral QHS    pantoprazole  40 mg Oral Daily with lunch    polyethylene glycol  17 g Oral Daily    sodium chloride 0.9%  10 mL Intravenous Q6H    warfarin  5 mg Oral Daily     PRN Meds:sodium chloride, sodium chloride, sodium chloride 0.9%, sodium chloride 0.9%, ALPRAZolam, bisacodyL, calcium carbonate, ondansetron, oxyCODONE, oxyCODONE, Flushing PICC Protocol **AND** sodium chloride 0.9% **AND** sodium chloride 0.9%, zolpidem    Review of patient's allergies indicates:   Allergen Reactions    Lisinopril Anaphylaxis    Hydralazine analogues      Chronic constipation, impotence, dizziness     Objective:     Vital Signs (Most Recent):  Temp: 97.6 °F (36.4 °C) (10/04/22 0750)  Pulse: 71 (10/04/22 0759)  Resp: 18 (10/04/22 0750)  BP: (!) 88/0 (10/04/22 0750)  SpO2: 96 % (10/04/22 0750)   Vital Signs (24h Range):  Temp:  [97.1 °F (36.2 °C)-98.6 °F (37 °C)] 97.6 °F (36.4 °C)  Pulse:  [68-83] 71  Resp:  [14-18] 18  SpO2:  [92 %-99 %] 96 %  BP: ()/(0-73) 88/0     Patient Vitals for the past 72 hrs (Last 3 readings):   Weight   10/03/22 0500 89.9 kg (198 lb 3.1 oz)   10/02/22 0455 89.2 kg (196 lb 10.4 oz)       Body mass index is 26.15 kg/m².      Intake/Output Summary (Last 24 hours) at 10/4/2022 0846  Last data filed at 10/3/2022 1234  Gross per 24 hour   Intake 682 ml   Output --   Net 682 ml         Hemodynamic Parameters:       Telemetry: SR    Physical Exam  Constitutional:       Appearance: Normal appearance.    HENT:      Head: Normocephalic and atraumatic.   Eyes:      Extraocular Movements: Extraocular movements intact.      Pupils: Pupils are equal, round, and reactive to light.   Neck:      Comments: Neck veins are not elevated  Cardiovascular:      Rate and Rhythm: Normal rate and regular rhythm.      Comments: Smooth VAD hum  Pulmonary:      Effort: Pulmonary effort is normal.      Breath sounds: Normal breath sounds.   Abdominal:      General: Bowel sounds are normal. There is no distension.      Palpations: Abdomen is soft.      Tenderness: There is no abdominal tenderness.      Comments: DLES dressing c/d/i   Musculoskeletal:         General: No swelling. Normal range of motion.      Cervical back: Normal range of motion and neck supple.      Right lower leg: No edema.      Left lower leg: No edema.      Comments: R groin with wound vac and CLEO drain. No drainage or bleeding.    Skin:     General: Skin is warm and dry.      Capillary Refill: Capillary refill takes 2 to 3 seconds.   Neurological:      General: No focal deficit present.      Mental Status: He is alert and oriented to person, place, and time.   Psychiatric:         Mood and Affect: Mood normal.         Behavior: Behavior normal.         Thought Content: Thought content normal.         Judgment: Judgment normal.       Significant Labs:  CBC:  Recent Labs   Lab 10/03/22  1102 10/03/22  1325 10/04/22  0348   WBC 7.58 8.85 7.33   RBC 3.02* 2.94* 2.83*   HGB 8.1* 8.0* 7.6*   HCT 27.8* 26.2* 25.7*    266 280   MCV 92 89 91   MCH 26.8* 27.2 26.9*   MCHC 29.1* 30.5* 29.6*       BNP:  Recent Labs   Lab 09/28/22  0332 09/30/22  0546 10/03/22  0513   * 179* 291*       CMP:  Recent Labs   Lab 10/02/22  0454 10/03/22  0513 10/04/22  0348   GLU 87 64* 73   CALCIUM 8.6* 8.4* 8.5*   ALBUMIN 1.9* 1.8* 1.9*   PROT 5.6* 5.3* 5.5*   * 134* 135*   K 4.1 3.7 3.8   CO2 24 25 26    103 103   BUN 12 12 11   CREATININE 1.3 1.1 1.0   ALKPHOS 83 85 81    ALT 20 20 16   AST 38 34 22   BILITOT 0.4 0.4 0.4        Coagulation:   Recent Labs   Lab 10/02/22  0454 10/03/22  0513 10/03/22  1325 10/03/22  1954 10/04/22  0348   INR 1.3* 1.5*  --   --  1.7*   APTT 44.2* 54.1* 44.4* 55.4* 46.4*       LDH:  Recent Labs   Lab 10/02/22  0454 10/03/22  0513 10/04/22  0348   * 238 232       Microbiology:  Microbiology Results (last 7 days)       Procedure Component Value Units Date/Time    Culture, Anaerobe [[id number]] Collected: 09/27/22 0944    Order Status: Completed Specimen: Wound from Groin Updated: 10/03/22 1000     Anaerobic Culture Culture in progress    Narrative:      4. FEMORAL TISSUE RIGHT GROIN    Culture, Anaerobe [[id number]] Collected: 09/27/22 0944    Order Status: Completed Specimen: Wound from Groin Updated: 10/03/22 1000     Anaerobic Culture Culture in progress    Narrative:      3. RIGHT FEMORAL GRAFT    Culture, Anaerobe [[id number]] Collected: 09/27/22 0944    Order Status: Completed Specimen: Wound from Groin Updated: 10/03/22 0959     Anaerobic Culture No anaerobes isolated    Narrative:      2. RIGHT FEMORAL ARTERY    Culture, Anaerobe [[id number]] Collected: 09/27/22 0944    Order Status: Completed Specimen: Wound from Groin Updated: 10/03/22 0959     Anaerobic Culture No anaerobes isolated    Narrative:      1. RIGHT GROIN WOUND    Aerobic culture [[id number]]  (Abnormal)  (Susceptibility) Collected: 09/27/22 0944    Order Status: Completed Specimen: Wound from Groin Updated: 10/02/22 1020     Aerobic Bacterial Culture ESCHERICHIA COLI ESBL  Moderate        PROTEUS MIRABILIS  Rare      Narrative:      1. RIGHT GROIN WOUND    Blood culture [[id number]] Collected: 09/25/22 0138    Order Status: Completed Specimen: Blood from Peripheral, Hand, Right Updated: 09/30/22 0612     Blood Culture, Routine No growth after 5 days.    Narrative:      Collection has been rescheduled by TR3 at 09/25/2022 00:31 Reason: Pt   dont wanna get stuck rn  rachille  Collection has been rescheduled by TR3 at 09/25/2022 00:31 Reason: Pt   dont wanna get stuck rn rachille    Blood culture [[id number]] Collected: 09/25/22 0133    Order Status: Completed Specimen: Blood from Peripheral, Lower Arm, Left Updated: 09/30/22 0612     Blood Culture, Routine No growth after 5 days.    Narrative:      Collection has been rescheduled by TR3 at 09/25/2022 00:31 Reason: Pt   dont wanna get stuck rn bernard[place]  Collection has been rescheduled by TR3 at 09/25/2022 00:31 Reason: Pt   dont wanna get stuck rn bernardalle    Aerobic culture [[id number]]  (Abnormal)  (Susceptibility) Collected: 09/27/22 0944    Order Status: Completed Specimen: Wound from Groin Updated: 09/29/22 1300     Aerobic Bacterial Culture ESCHERICHIA COLI  Rare      Narrative:      4. FEMORAL TISSUE RIGHT GROIN    Aerobic culture [[id number]]  (Abnormal)  (Susceptibility) Collected: 09/27/22 0944    Order Status: Completed Specimen: Wound from Groin Updated: 09/29/22 1259     Aerobic Bacterial Culture ESCHERICHIA COLI ESBL  Rare      Narrative:      3. RIGHT FEMORAL GRAFT    Aerobic culture [[id number]]  (Abnormal)  (Susceptibility) Collected: 09/27/22 0944    Order Status: Completed Specimen: Wound from Groin Updated: 09/29/22 1248     Aerobic Bacterial Culture CITROBACTER FARMERI  Rare      Narrative:      2. RIGHT FEMORAL ARTERY    Culture, Anaerobe [[id number]] Collected: 09/25/22 0815    Order Status: Completed Specimen: Wound from Groin Updated: 09/29/22 0932     Anaerobic Culture No anaerobes isolated    AFB Culture & Smear [[id number]] Collected: 09/27/22 0944    Order Status: Completed Specimen: Wound from Groin Updated: 09/29/22 0927     AFB Culture & Smear Culture in progress     AFB CULTURE STAIN No acid fast bacilli seen.    Narrative:      1. RIGHT GROIN WOUND    AFB Culture & Smear [[id number]] Collected: 09/27/22 0944    Order Status: Completed Specimen: Wound from Groin Updated: 09/28/22 2127     AFB Culture &  Smear Culture in progress     AFB CULTURE STAIN No acid fast bacilli seen.    Narrative:      2. RIGHT FEMORAL ARTERY    AFB Culture & Smear [[id number]] Collected: 09/27/22 0944    Order Status: Completed Specimen: Wound from Groin Updated: 09/28/22 2127     AFB Culture & Smear Culture in progress     AFB CULTURE STAIN No acid fast bacilli seen.    Narrative:      3. RIGHT FEMORAL GRAFT    AFB Culture & Smear [[id number]] Collected: 09/27/22 0944    Order Status: Completed Specimen: Wound from Groin Updated: 09/28/22 2127     AFB Culture & Smear Culture in progress     AFB CULTURE STAIN No acid fast bacilli seen.    Narrative:      4. FEMORAL TISSUE RIGHT GROIN    Gram stain [[id number]] Collected: 09/27/22 0944    Order Status: Completed Specimen: Wound from Groin Updated: 09/27/22 1312     Gram Stain Result Few WBC's      No organisms seen    Narrative:      1. RIGHT GROIN WOUND    Gram stain [[id number]] Collected: 09/27/22 0944    Order Status: Completed Specimen: Wound from Groin Updated: 09/27/22 1204     Gram Stain Result No WBC's      No organisms seen    Narrative:      2. RIGHT FEMORAL ARTERY    Gram stain [[id number]] Collected: 09/27/22 0944    Order Status: Completed Specimen: Wound from Groin Updated: 09/27/22 1203     Gram Stain Result No WBC's      No organisms seen    Narrative:      3. RIGHT FEMORAL GRAFT    Gram stain [[id number]] Collected: 09/27/22 0944    Order Status: Completed Specimen: Wound from Groin Updated: 09/27/22 1202     Gram Stain Result No WBC's      No organisms seen    Narrative:      4. FEMORAL TISSUE RIGHT GROIN    Fungus culture [[id number]] Collected: 09/27/22 0944    Order Status: Sent Specimen: Wound from Groin Updated: 09/27/22 1036    Fungus culture [[id number]] Collected: 09/27/22 0944    Order Status: Sent Specimen: Wound from Groin Updated: 09/27/22 1034    Fungus culture [[id number]] Collected: 09/27/22 0944    Order Status: Sent Specimen: Wound from Groin Updated: 09/27/22  1032    Fungus culture [[id number]] Collected: 09/27/22 0944    Order Status: Sent Specimen: Wound from Groin Updated: 09/27/22 1029    Aerobic culture [[id number]]  (Abnormal)  (Susceptibility) Collected: 09/25/22 0815    Order Status: Completed Specimen: Wound from Groin Updated: 09/27/22 0945     Aerobic Bacterial Culture ESCHERICHIA COLI ESBL  Few              I have reviewed all pertinent labs within the past 24 hours.    Estimated Creatinine Clearance: 94.3 mL/min (based on SCr of 1 mg/dL).    Diagnostic Results:  I have reviewed and interpreted all pertinent imaging results/findings within the past 24 hours.

## 2022-10-04 NOTE — PROGRESS NOTES
[facility] - Cardiology Stepdown  Heart Transplant  Progress Note    Patient Name: Tim Richards  MRN: [id number]  Admission Date: 9/24/2022  Hospital Length of Stay: 10 days  Attending Physician: Roslyn Ragsdale DO  Primary Care Provider: Deyanira Booth MD  Principal Problem:Pseudoaneurysm of right femoral artery    Subjective:     **Interval History: Now a week out from surgery. Pain remains under control, no signs of bleeding. Trach de cannulated yesterday. INR up to 1.7.       Continuous Infusions:   heparin (porcine) in D5W 8 Units/kg/hr (10/03/22 2121)     Scheduled Meds:   acetaminophen  1,000 mg Oral TID    amiodarone  400 mg Oral Daily    ertapenem (INVANZ) IVPB  1 g Intravenous Q24H    levothyroxine  50 mcg Oral Before breakfast    magnesium oxide  400 mg Oral Daily with lunch    mexiletine  250 mg Oral Q8H    mirtazapine  30 mg Oral QHS    pantoprazole  40 mg Oral Daily with lunch    polyethylene glycol  17 g Oral Daily    sodium chloride 0.9%  10 mL Intravenous Q6H    warfarin  5 mg Oral Daily     PRN Meds:sodium chloride, sodium chloride, sodium chloride 0.9%, sodium chloride 0.9%, ALPRAZolam, bisacodyL, calcium carbonate, ondansetron, oxyCODONE, oxyCODONE, Flushing PICC Protocol **AND** sodium chloride 0.9% **AND** sodium chloride 0.9%, zolpidem    Review of patient's allergies indicates:   Allergen Reactions    Lisinopril Anaphylaxis    Hydralazine analogues      Chronic constipation, impotence, dizziness     Objective:     Vital Signs (Most Recent):  Temp: 97.6 °F (36.4 °C) (10/04/22 0750)  Pulse: 71 (10/04/22 0759)  Resp: 18 (10/04/22 0750)  BP: (!) 88/0 (10/04/22 0750)  SpO2: 96 % (10/04/22 0750)   Vital Signs (24h Range):  Temp:  [97.1 °F (36.2 °C)-98.6 °F (37 °C)] 97.6 °F (36.4 °C)  Pulse:  [68-83] 71  Resp:  [14-18] 18  SpO2:  [92 %-99 %] 96 %  BP: ()/(0-73) 88/0     Patient Vitals for the past 72 hrs (Last 3 readings):   Weight   10/03/22 0500 89.9 kg (198 lb 3.1  oz)   10/02/22 0455 89.2 kg (196 lb 10.4 oz)       Body mass index is 26.15 kg/m².      Intake/Output Summary (Last 24 hours) at 10/4/2022 0846  Last data filed at 10/3/2022 1234  Gross per 24 hour   Intake 682 ml   Output --   Net 682 ml         Hemodynamic Parameters:       Telemetry: SR    Physical Exam  Constitutional:       Appearance: Normal appearance.   HENT:      Head: Normocephalic and atraumatic.   Eyes:      Extraocular Movements: Extraocular movements intact.      Pupils: Pupils are equal, round, and reactive to light.   Neck:      Comments: Neck veins are not elevated  Cardiovascular:      Rate and Rhythm: Normal rate and regular rhythm.      Comments: Smooth VAD hum  Pulmonary:      Effort: Pulmonary effort is normal.      Breath sounds: Normal breath sounds.   Abdominal:      General: Bowel sounds are normal. There is no distension.      Palpations: Abdomen is soft.      Tenderness: There is no abdominal tenderness.      Comments: DLES dressing c/d/i   Musculoskeletal:         General: No swelling. Normal range of motion.      Cervical back: Normal range of motion and neck supple.      Right lower leg: No edema.      Left lower leg: No edema.      Comments: R groin with wound vac and CLEO drain. No drainage or bleeding.    Skin:     General: Skin is warm and dry.      Capillary Refill: Capillary refill takes 2 to 3 seconds.   Neurological:      General: No focal deficit present.      Mental Status: He is alert and oriented to person, place, and time.   Psychiatric:         Mood and Affect: Mood normal.         Behavior: Behavior normal.         Thought Content: Thought content normal.         Judgment: Judgment normal.       Significant Labs:  CBC:  Recent Labs   Lab 10/03/22  1102 10/03/22  1325 10/04/22  0348   WBC 7.58 8.85 7.33   RBC 3.02* 2.94* 2.83*   HGB 8.1* 8.0* 7.6*   HCT 27.8* 26.2* 25.7*    266 280   MCV 92 89 91   MCH 26.8* 27.2 26.9*   MCHC 29.1* 30.5* 29.6*       BNP:  Recent Labs    Lab 09/28/22  0332 09/30/22  0546 10/03/22  0513   * 179* 291*       CMP:  Recent Labs   Lab 10/02/22  0454 10/03/22  0513 10/04/22  0348   GLU 87 64* 73   CALCIUM 8.6* 8.4* 8.5*   ALBUMIN 1.9* 1.8* 1.9*   PROT 5.6* 5.3* 5.5*   * 134* 135*   K 4.1 3.7 3.8   CO2 24 25 26    103 103   BUN 12 12 11   CREATININE 1.3 1.1 1.0   ALKPHOS 83 85 81   ALT 20 20 16   AST 38 34 22   BILITOT 0.4 0.4 0.4        Coagulation:   Recent Labs   Lab 10/02/22  0454 10/03/22  0513 10/03/22  1325 10/03/22  1954 10/04/22  0348   INR 1.3* 1.5*  --   --  1.7*   APTT 44.2* 54.1* 44.4* 55.4* 46.4*       LDH:  Recent Labs   Lab 10/02/22  0454 10/03/22  0513 10/04/22  0348   * 238 232       Microbiology:  Microbiology Results (last 7 days)       Procedure Component Value Units Date/Time    Culture, Anaerobe [[id number]] Collected: 09/27/22 0944    Order Status: Completed Specimen: Wound from Groin Updated: 10/03/22 1000     Anaerobic Culture Culture in progress    Narrative:      4. FEMORAL TISSUE RIGHT GROIN    Culture, Anaerobe [[id number]] Collected: 09/27/22 0944    Order Status: Completed Specimen: Wound from Groin Updated: 10/03/22 1000     Anaerobic Culture Culture in progress    Narrative:      3. RIGHT FEMORAL GRAFT    Culture, Anaerobe [[id number]] Collected: 09/27/22 0944    Order Status: Completed Specimen: Wound from Groin Updated: 10/03/22 0959     Anaerobic Culture No anaerobes isolated    Narrative:      2. RIGHT FEMORAL ARTERY    Culture, Anaerobe [[id number]] Collected: 09/27/22 0944    Order Status: Completed Specimen: Wound from Groin Updated: 10/03/22 0959     Anaerobic Culture No anaerobes isolated    Narrative:      1. RIGHT GROIN WOUND    Aerobic culture [[id number]]  (Abnormal)  (Susceptibility) Collected: 09/27/22 0944    Order Status: Completed Specimen: Wound from Groin Updated: 10/02/22 1020     Aerobic Bacterial Culture ESCHERICHIA COLI ESBL  Moderate        PROTEUS MIRABILIS  Rare       Narrative:      1. RIGHT GROIN WOUND    Blood culture [[id number]] Collected: 09/25/22 0138    Order Status: Completed Specimen: Blood from Peripheral, Hand, Right Updated: 09/30/22 0612     Blood Culture, Routine No growth after 5 days.    Narrative:      Collection has been rescheduled by TR3 at 09/25/2022 00:31 Reason: Pt   dont wanna get stuck rn rachille  Collection has been rescheduled by TR3 at 09/25/2022 00:31 Reason: Pt   dont wanna get stuck rn rachille    Blood culture [[id number]] Collected: 09/25/22 0133    Order Status: Completed Specimen: Blood from Peripheral, Lower Arm, Left Updated: 09/30/22 0612     Blood Culture, Routine No growth after 5 days.    Narrative:      Collection has been rescheduled by TR3 at 09/25/2022 00:31 Reason: Pt   dont wanna get stuck rn rachille  Collection has been rescheduled by TR3 at 09/25/2022 00:31 Reason: Pt   dont wanna get stuck rn rachille    Aerobic culture [[id number]]  (Abnormal)  (Susceptibility) Collected: 09/27/22 0944    Order Status: Completed Specimen: Wound from Groin Updated: 09/29/22 1300     Aerobic Bacterial Culture ESCHERICHIA COLI  Rare      Narrative:      4. FEMORAL TISSUE RIGHT GROIN    Aerobic culture [[id number]]  (Abnormal)  (Susceptibility) Collected: 09/27/22 0944    Order Status: Completed Specimen: Wound from Groin Updated: 09/29/22 1259     Aerobic Bacterial Culture ESCHERICHIA COLI ESBL  Rare      Narrative:      3. RIGHT FEMORAL GRAFT    Aerobic culture [[id number]]  (Abnormal)  (Susceptibility) Collected: 09/27/22 0944    Order Status: Completed Specimen: Wound from Groin Updated: 09/29/22 1248     Aerobic Bacterial Culture CITROBACTER FARMERI  Rare      Narrative:      2. RIGHT FEMORAL ARTERY    Culture, Anaerobe [[id number]] Collected: 09/25/22 0815    Order Status: Completed Specimen: Wound from Groin Updated: 09/29/22 0932     Anaerobic Culture No anaerobes isolated    AFB Culture & Smear [[id number]] Collected: 09/27/22 0944    Order  Status: Completed Specimen: Wound from Groin Updated: 09/29/22 0927     AFB Culture & Smear Culture in progress     AFB CULTURE STAIN No acid fast bacilli seen.    Narrative:      1. RIGHT GROIN WOUND    AFB Culture & Smear [[id number]] Collected: 09/27/22 0944    Order Status: Completed Specimen: Wound from Groin Updated: 09/28/22 2127     AFB Culture & Smear Culture in progress     AFB CULTURE STAIN No acid fast bacilli seen.    Narrative:      2. RIGHT FEMORAL ARTERY    AFB Culture & Smear [[id number]] Collected: 09/27/22 0944    Order Status: Completed Specimen: Wound from Groin Updated: 09/28/22 2127     AFB Culture & Smear Culture in progress     AFB CULTURE STAIN No acid fast bacilli seen.    Narrative:      3. RIGHT FEMORAL GRAFT    AFB Culture & Smear [[id number]] Collected: 09/27/22 0944    Order Status: Completed Specimen: Wound from Groin Updated: 09/28/22 2127     AFB Culture & Smear Culture in progress     AFB CULTURE STAIN No acid fast bacilli seen.    Narrative:      4. FEMORAL TISSUE RIGHT GROIN    Gram stain [[id number]] Collected: 09/27/22 0944    Order Status: Completed Specimen: Wound from Groin Updated: 09/27/22 1312     Gram Stain Result Few WBC's      No organisms seen    Narrative:      1. RIGHT GROIN WOUND    Gram stain [[id number]] Collected: 09/27/22 0944    Order Status: Completed Specimen: Wound from Groin Updated: 09/27/22 1204     Gram Stain Result No WBC's      No organisms seen    Narrative:      2. RIGHT FEMORAL ARTERY    Gram stain [[id number]] Collected: 09/27/22 0944    Order Status: Completed Specimen: Wound from Groin Updated: 09/27/22 1203     Gram Stain Result No WBC's      No organisms seen    Narrative:      3. RIGHT FEMORAL GRAFT    Gram stain [[id number]] Collected: 09/27/22 0944    Order Status: Completed Specimen: Wound from Groin Updated: 09/27/22 1202     Gram Stain Result No WBC's      No organisms seen    Narrative:      4. FEMORAL TISSUE RIGHT GROIN    Fungus culture  [[id number]] Collected: 09/27/22 0944    Order Status: Sent Specimen: Wound from Groin Updated: 09/27/22 1036    Fungus culture [[id number]] Collected: 09/27/22 0944    Order Status: Sent Specimen: Wound from Groin Updated: 09/27/22 1034    Fungus culture [[id number]] Collected: 09/27/22 0944    Order Status: Sent Specimen: Wound from Groin Updated: 09/27/22 1032    Fungus culture [[id number]] Collected: 09/27/22 0944    Order Status: Sent Specimen: Wound from Groin Updated: 09/27/22 1029    Aerobic culture [[id number]]  (Abnormal)  (Susceptibility) Collected: 09/25/22 0815    Order Status: Completed Specimen: Wound from Groin Updated: 09/27/22 0945     Aerobic Bacterial Culture ESCHERICHIA COLI ESBL  Few              I have reviewed all pertinent labs within the past 24 hours.    Estimated Creatinine Clearance: 94.3 mL/min (based on SCr of 1 mg/dL).    Diagnostic Results:  I have reviewed and interpreted all pertinent imaging results/findings within the past 24 hours.    Assessment and Plan:     Tim Richards is a 56 y.o.  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  His post operative course was complicated by cardiogenic shock/RV failure requiring VA-ECMO.  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  He has a 8 Fr cuffed Shiley trach.  He had a prolonged and complicated hospital course from 6/24/22 - 9/4/22, was discharged to rehab (see clinic visit from 9/22/22 for additional details).  Of note he was last seen in clinic by Dr. Ragsdale on 9/22/22 after being discharged from rehab and at the time was noted to have purulence from the groin site and was placed on doxycycline PO.  He has had complaints of fatigue since his last hospitalization, per his wife.  This morning he was sitting and was crossing  his legs for a prolonged period of time, and when he uncrossed them he noticed a pool of blood under him and active bleeding from his R groin where his previous ECMO cannula was.  He began to feel lightheaded when he tried walking.  Per wife he had 2 low flow alarms at home.      In the ED, patient stated he felt at his baseline and had no complaints.  No active bleeding at the time of my evaluation from R groin site.  Doppler BP was 80/0.  Hgb dropped from 9.4 -> 8.9 over the past 2 days.  INR was 3.0.  CT Angio performed per CV surgery recommendations.  HTS was consulted for admission, CT surgery was consulted in the ED for evaluation of groin hematoma.       Cardiovascular Studies  TTE 8/30/22   There is an LVAD present. Base speed is 6300 RPMs. The pump type is a Heartmate III. The interventricular septum appears midline. The aortic valve does not open.   The left ventricle is severely enlarged with eccentric hypertrophy and severely decreased systolic function.   The estimated ejection fraction is 10%.   There is left ventricular global hypokinesis.   Left ventricular diastolic dysfunction.   There is trivial continuous aortic regurgitation.   There is abnormal septal wall motion.   The right ventricle is poorly seen, but appears enlarged with reduced systolic function.   Mild tricuspid regurgitation.      * Pseudoaneurysm of right femoral artery  -Vascular surgery consulted, appreciate recommendations  -CTA shows 3 new pseudoaneurysms of R femoral artery  -S/p  Right Ileofemoral bypass 9/27/22 (end to end). Distal External Iliac to distal femoral artery  -Wound vac in place     Tracheostomy present  Consult placed to gen surg. Dr landin to decannulate his trach ahead of d/c.     Groin hematoma  -Was on VA-ECMO at last hospitalization with successful decannulation  -CTA revealed 3 possible new pseudoaneurysms  -INR 3 on admission, since reversed with PO vit K and FFP.  Holding A/C until further  instruction from surgical team regarding resumption of A/C.   -Hgb was trending down since admit. Transfusing to keep Hgb > 10  -Cultures from right groin wound with ESBL E Coli, per ID hold dapto and continue on ertapenem for total of 6 weeks.     VT (ventricular tachycardia)  -Patient has [employer] SICD  -Currently on amiodarone 400mg qd and mexiletine 250mg TID, will continue     LVAD (left ventricular assist device) present  -S/P S/P DT HM2 3/8/2018 then S/P DT HM3 exchange 7/13/2022 2/2 to thrombosis of HM2 thought caused by COVID PNA  -Current speed 6400  -Last TTE done 8/30/2022 at 6300: LVEDD 7.44, LVEF 10%, RV appears enlarged and decreased, trace AI, trace MR, mild TR, PAS > 28, IVC 3, AV does not open, septum midline  -restarted coumadin post op and minimally intense heparin gtt. Goal INR 2.0-3.0.  Subtherapeutic today.  Previous home dose was 5mg Qdaily   -LDH is stable    Procedure: Device Interrogation Including analysis of device parameters  Current Settings: Ventricular Assist Device  Review of device function is stable    TXP LVAD INTERROGATIONS 10/4/2022 10/4/2022 10/4/2022 10/3/2022 10/3/2022 10/3/2022 10/3/2022   Type HeartMate3 HeartMate3 HeartMate3 HeartMate3 HeartMate3 HeartMate3 HeartMate3   Flow 5.5 5.4 5.5 5.5 5.4 5.1 5.5   Speed 6400 6400 6400 6400 6400 6400 6400   PI 2.1 2.1 2.2 1.3 2.1 3.4 2.2   Power (Jackson) 5.5 5.5 5.5 5.5 5.5 5.4 5.5   LSL 6000 6000 6000 6000 - - 6000   Low Flow Alarm - - - - - - -   High Power Alarm - - - - - - -   Pulsatility Intermittent pulse - - - - - -         Hypertension  -BP controlled    Chronic combined systolic and diastolic congestive heart failure  -NICM   -Echo 8/30/22 EF: 10%, LVEDD: 7.44  -Patient was on Torsemide prior to admit: 40mg Qdaily    -GDMT: N/A  -Euvolemic on exam, hold diuresis can discharge with torsemide PRN.   -Maintain K>4.0, Mg>2.0        YANET Kelley-C  Heart Transplant  [facility] - Cardiology Step[facility]

## 2022-10-04 NOTE — PROGRESS NOTES
10/04/2022  Carissa Dean    Current provider:  Roslyn Ragsdale DO    Device interrogation:  TXP LVAD INTERROGATIONS 10/4/2022 10/4/2022 10/4/2022 10/3/2022 10/3/2022 10/3/2022 10/3/2022   Type HeartMate3 HeartMate3 HeartMate3 HeartMate3 HeartMate3 HeartMate3 HeartMate3   Flow 5.5 5.4 5.5 5.5 5.4 5.1 5.5   Speed 6400 6400 6400 6400 6400 6400 6400   PI 2.1 2.1 2.2 1.3 2.1 3.4 2.2   Power (Jackson) 5.5 5.5 5.5 5.5 5.5 5.4 5.5   LSL 6000 6000 6000 6000 - - 6000   Low Flow Alarm - - - - - - -   High Power Alarm - - - - - - -   Pulsatility Intermittent pulse - - - - - -          Rounded on Tim Richards to ensure all mechanical assist device settings (IABP or VAD) were appropriate and all parameters were within limits.  I was able to ensure all back up equipment was present, the staff had no issues, and the Perfusion Department daily rounding was complete.      For implantable VADs: Interrogation of Ventricular assist device was performed with analysis of device parameters and review of device function. I have personally reviewed the interrogation findings and agree with findings as stated.     In emergency, the nursing units have been notified to contact the perfusion department either by:  Calling [phone] from 630am to 4pm Mon thru Fri, utilizing the On-Call Finder functionality of Epic and searching for Perfusion, or by contacting the hospital  from 4pm to 630am and on weekends and asking to speak with the perfusionist on call.    11:38 AM

## 2022-10-04 NOTE — PROGRESS NOTES
[facility] - [facility]iology Step[facility]  Wound Care    Patient Name:  Tim Richards   MRN:  [id number]  Date: 10/4/2022  Diagnosis: Pseudoaneurysm of right femoral artery    History:     Past Medical History:   Diagnosis Date    A-fib     Anticoagulant long-term use     Atrial flutter 2022    CHF (congestive heart failure)     Class 1 obesity due to excess calories with serious comorbidity and body mass index (BMI) of 31.0 to 31.9 in adult     Class 1 obesity due to excess calories with serious comorbidity and body mass index (BMI) of 32.0 to 32.9 in adult     Dilated cardiomyopathy 1/10/2018    Disorder of kidney and ureter     CKD    Encounter for blood transfusion     Essential hypertension 2022    Gout     HTN (hypertension)     Hx of psychiatric care     ICD (implantable cardioverter-defibrillator) infection 2020    Psychiatric problem     Thyroid disease     Ventricular tachycardia (paroxysmal)        Social History     Socioeconomic History    Marital status:     Number of children: 3   Occupational History     Employer: [employer]    Tobacco Use    Smoking status: Former     Packs/day: 1.00     Years: 31.00     Pack years: 31.00     Types: Cigarettes     Quit date: 2018     Years since quittin.7    Smokeless tobacco: Never    Tobacco comments:     pt is quiting on his own - pt stated not qualified for program;  pt  quit on his own   Substance and Sexual Activity    Alcohol use: No     Alcohol/week: 0.0 standard drinks     Comment: quit    Drug use: No    Sexual activity: Yes     Partners: Female     Birth control/protection: None     Comment: 10/5/17  with same partner 7 years    Social History Narrative    Disabled       Precautions:     Allergies as of 2022 - Reviewed 2022   Allergen Reaction Noted    Lisinopril Anaphylaxis 2017    Hydralazine analogues  2020       [facility] Assessment Details/Treatment   10/4 FU Changed vac today. Wound is pink, red  and moist with beefy tissue. The wound has scant serosanguinous drainage. Changed dressing on abdomen.Two gauze and tape. Pink and red tissue with scant serosanguinous drainage on the dressing.         10/04/22 1600   WOCN Assessment   WOCN Total Time (mins) 30   Visit Date 10/04/22   Visit Time 1600   Consult Type New;Follow Up   WOCN Speciality Wound   WOCN List wound vac   Intervention assessed;applied;chart review;changed;coordination of care;orders   Teaching on-going        Incision/Site 07/22/22 1135 Right Abdomen   Date First Assessed/Time First Assessed: 07/22/22 1135   Side: Right  Location: Abdomen   Wound Image    Dressing Appearance Moist drainage   Drainage Amount Scant   Drainage Characteristics/Odor Serosanguineous;No odor   Appearance Pink;Red;Moist   Periwound Area Moist;Pink;Redness   Wound Length (cm) 3 cm   Wound Width (cm) 2 cm   Wound Depth (cm) 0.3 cm   Wound Volume (cm^3) 1.8 cm^3   Wound Surface Area (cm^2) 6 cm^2   Care Cleansed with:;Wound cleanser;Applied:;Removed:   Dressing Removed;Applied;Changed;Gauze   Dressing Change Due 10/05/22        Incision/Site 09/27/22 1136 Right Groin   Date First Assessed/Time First Assessed: 09/27/22 1136   Side: Right  Location: Groin   Wound Image    Dressing Appearance other (see comments)  (wound vac)   Drainage Amount Scant   Drainage Characteristics/Odor Serosanguineous;No odor   Appearance Red;Pink;Moist   Periwound Area Moist   Wound Length (cm) 5.4 cm   Wound Width (cm) 4 cm   Wound Depth (cm) 0.2 cm   Wound Volume (cm^3) 4.32 cm^3   Wound Surface Area (cm^2) 21.6 cm^2   Care Cleansed with:;Wound cleanser;Applied:;Removed:   Dressing Change Due 10/07/22        Negative Pressure Wound Therapy  09/27/22 1132 Right anterior   Placement Date/Time: 09/27/22 1132   Side: Right  Orientation: anterior  Location: Groin  Additional Comments: PLACED BY MAYE MCHUGH, RESIDENT FOR PLASTICS; OR 10 ILEO-FEMORAL BYPASS GRAFT WITH MUSCLE FLAP   NPWT Type  Vacuum Therapy   Therapy Setting NPWT Continuous therapy       10/04/2022

## 2022-10-04 NOTE — PLAN OF CARE
Pt maintained free from falls/trauma/injuries and skin breakdown. Pt denies any pain. No alarms noted overnight and LVAD hum smooth. Heparin going at 8 units/kg/hr and next aptt due at 0950. Lab drawn. Pt doppler elevated and resolved without any intervention (see previous note). Tums given for stomach discomfort. Contact precaution maintained. Pt has no urine output overnight and refused bladder scan, MD Gomez notified. Plan of care reviewed. Pt verbalized understanding. All questions and concerns addressed. Will continue to monitor.

## 2022-10-04 NOTE — NURSING
Pt resting in bed. Feelings discourage and depressed because he was almost back to being able to do more for himself at home and then he got put back in the hospital. I sat with patient for about an hour and had the wife on the phone. We covered many topics including IV antibiotics, wound care with wound vac, CLEO drain, trach site dressing at home, PT/OT, and all F/U appts. Pt will have HH through [facility] and IV antibiotics with [facility].     Pt is also not eating much in the hospital due to the food being served. He states that he has called dietary but no one answers the phone. I spoke to patient's nurse who will contact dietary and order what patient is requesting.     VAD Coordinator remains available.

## 2022-10-04 NOTE — PLAN OF CARE
VSS. LVAD number and doppler WNL. LVAD dressing change per protocol (see note). CLEO drain removed by MD during the day.Continue on heparin gtt 8  units/kg/hr. Pt educated on fall risk and remained free from falls/trauma/injury. Denies chest pain, SOB, palpitations, dizziness, pain, or discomfort. Plan of care reviewed with pt, all questions answered. Bed locked in lowest position, call bell within reach, no acute distress noted, will continue to monitor.

## 2022-10-04 NOTE — PLAN OF CARE
[facility]   Heart Transplant/VAD Clinic   [address] [place] [address]   [phone] after hours          [phone] fax     VAD HOME  HEALTH ORDERS      Admit to Home Health    Diagnosis:   Patient Active Problem List   Diagnosis    Cigarette nicotine dependence without complication    Alcohol abuse    Pulmonary nodule seen on imaging study    Chronic combined systolic and diastolic congestive heart failure    Palliative care encounter    Hypoglycemia    Hyperbilirubinemia    Anemia    Hypertension    LVAD (left ventricular assist device) present    Pre-transplant evaluation for heart transplant    GIB (gastrointestinal bleeding)    Thrombocytopenia, unspecified    GI bleed    VT (ventricular tachycardia)    Amiodarone-induced hyperthyroidism    Substance or medication-induced sleep disorder, insomnia type    VF (ventricular fibrillation)    CHICHI (obstructive sleep apnea)    Post traumatic stress disorder (PTSD)    Acute on chronic combined systolic and diastolic congestive heart failure    History of ventricular tachycardia    Severe malnutrition    Atrial flutter    Adjustment disorder with mixed anxiety and depressed mood    Hyponatremia    Stage 3b chronic kidney disease    Hypertensive cardiovascular-renal disease, stage 1-4 or unspecified chronic kidney disease, with heart failure    High risk medications (not anticoagulants) long-term use    Dilated cardiomyopathy    Anticoagulation monitoring, INR range 2-3    Impaired mobility    Acute on chronic combined systolic and diastolic heart failure    Left ventricular assist device (LVAD) complication, subsequent encounter    History of COVID-19    Groin hematoma    Pseudoaneurysm of right femoral artery    Mycotic aneurysm    Tracheostomy present       Patient is homebound due to:  NYHA Class IV HF. S/P LVAD placement.     Diet: Low Fat, Low cholesterol, 2Gm Na, Coumadin restrictions.    Acitivities: No  Swimming, bathing, vacuuming, contact sports.    Fresh implants= Sternal Precautions    Nursing:   SN to complete comprehensive assessment including routine vital signs. Instruct on disease process and s/s of complications to report to MD. Review/verify medication list sent home with the patient at time of discharge  and instruct patient/caregiver as needed. Frequency may be adjusted depending on start of care date.    **LVAD driveline exit site dressing change is to be completed per LVAD patient/caregiver only**.    Notify MD if:  SBP > 120 or < 80;   MAP > 80 or < 65;   HR > 120 or < 60;   Temp > 101;   Weight gain >3lbs in 1 day or 5lbs in 1 week.    LABS:  SN to perform labs: Weekly CBC, CMP (on mondays) PT/INR per Coumadin clinic [phone].   Follow up INR date: 10/7/22  No Finger Sticks    HOME INFUSION THERAPY:    SN to perform Infusion Therapy/Central Line Care.  Review Central Line Care & Central Line Flush with patient.    Administer (drug and dose): Ertapenem, 1 gram IV, every 24 hours (administer over 30 minutes).     Estimated end date of abx: 11/8/22    Central line care:  Scrub the Hub: Prior to accessing the line, always perform a 30 second alcohol scrub  Each lumen of the central line is to be flushed at least daily with 10 mL Normal Saline and 3 mL Heparin flush (100 units/mL)  Skilled Nurse (SN) may draw blood from IV access  Blood Draw Procedure:   - Aspirate at least 5 mL of blood   - Discard   - Obtain specimen   - Change posiflow cap   - Flush with 20 mL Normal Saline followed by a                 3-5 mL Heparin flush (100 units/mL)  Central :   - Sterile dressing changes are done weekly and as needed.   - Use chlor-hexadine scrub to cleanse site, apply Biopatch to insertion site,       apply securement device dressing   - Posi-flow caps are changed weekly and after EVERY lab draw.   - If sterile gauze is under dressing to control oozing,                 dressing change  must be performed every 24 hours until gauze is not needed.    CONSULTS:       Physical Therapy to evaluate and treat. Evaluate for home safety and equipment needs; Establish/upgrade home exercise program. Perform / instruct on therapeutic exercises, gait training, transfer training, and Range of Motion.    Occupational Therapy to evaluate and treat. Evaluate home environment for safety and equipment needs. Perform/Instruct on transfers, ADL training, ROM, and therapeutic exercises.    Wound Care (for wound vac on R femoral area)    a. Ensure that canister is engaged into pump, replace canister weekly or when full   b. If leak alarm occurs, apply additional thin film at location of leak   c. Alarm troubleshooting contact [facility]. Phone # is [phone], Select option 5. [employer] representative is available 24 hours a day 7 days per week.   e. When therapy is discontinued or patient is discharged, please return non disposable NPWT unit per facility       Send initial Home Health orders to HTS attending physician on call.  Send follow up questions to VAD clinic MD [phone] or [phone].

## 2022-10-05 ENCOUNTER — PATIENT MESSAGE (OUTPATIENT)
Dept: TRANSPLANT | Facility: CLINIC | Age: 57
End: 2022-10-05
Payer: COMMERCIAL

## 2022-10-05 VITALS
HEIGHT: 73 IN | OXYGEN SATURATION: 98 % | TEMPERATURE: 98 F | SYSTOLIC BLOOD PRESSURE: 80 MMHG | BODY MASS INDEX: 26.12 KG/M2 | RESPIRATION RATE: 16 BRPM | HEART RATE: 73 BPM | WEIGHT: 197.06 LBS

## 2022-10-05 LAB
ALBUMIN SERPL BCP-MCNC: 1.9 G/DL (ref 3.5–5.2)
ALP SERPL-CCNC: 79 U/L (ref 55–135)
ALT SERPL W/O P-5'-P-CCNC: 12 U/L (ref 10–44)
ANION GAP SERPL CALC-SCNC: 7 MMOL/L (ref 8–16)
APTT BLDCRRT: 54 SEC (ref 21–32)
AST SERPL-CCNC: 17 U/L (ref 10–40)
BASOPHILS # BLD AUTO: 0.02 K/UL (ref 0–0.2)
BASOPHILS NFR BLD: 0.3 % (ref 0–1.9)
BILIRUB DIRECT SERPL-MCNC: 0.2 MG/DL (ref 0.1–0.3)
BILIRUB SERPL-MCNC: 0.3 MG/DL (ref 0.1–1)
BNP SERPL-MCNC: 201 PG/ML (ref 0–99)
BUN SERPL-MCNC: 10 MG/DL (ref 6–20)
CALCIUM SERPL-MCNC: 8.1 MG/DL (ref 8.7–10.5)
CHLORIDE SERPL-SCNC: 104 MMOL/L (ref 95–110)
CO2 SERPL-SCNC: 24 MMOL/L (ref 23–29)
CREAT SERPL-MCNC: 1.1 MG/DL (ref 0.5–1.4)
CRP SERPL-MCNC: 50.4 MG/L (ref 0–8.2)
DIFFERENTIAL METHOD: ABNORMAL
EOSINOPHIL # BLD AUTO: 0.3 K/UL (ref 0–0.5)
EOSINOPHIL NFR BLD: 5.1 % (ref 0–8)
ERYTHROCYTE [DISTWIDTH] IN BLOOD BY AUTOMATED COUNT: 14.9 % (ref 11.5–14.5)
EST. GFR  (NO RACE VARIABLE): >60 ML/MIN/1.73 M^2
GLUCOSE SERPL-MCNC: 90 MG/DL (ref 70–110)
HCT VFR BLD AUTO: 25.5 % (ref 40–54)
HGB BLD-MCNC: 7.5 G/DL (ref 14–18)
IMM GRANULOCYTES # BLD AUTO: 0.09 K/UL (ref 0–0.04)
IMM GRANULOCYTES NFR BLD AUTO: 1.5 % (ref 0–0.5)
INR PPP: 2.1 (ref 0.8–1.2)
LDH SERPL L TO P-CCNC: 241 U/L (ref 110–260)
LYMPHOCYTES # BLD AUTO: 1.2 K/UL (ref 1–4.8)
LYMPHOCYTES NFR BLD: 20.3 % (ref 18–48)
MAGNESIUM SERPL-MCNC: 2 MG/DL (ref 1.6–2.6)
MCH RBC QN AUTO: 26.8 PG (ref 27–31)
MCHC RBC AUTO-ENTMCNC: 29.4 G/DL (ref 32–36)
MCV RBC AUTO: 91 FL (ref 82–98)
MONOCYTES # BLD AUTO: 0.7 K/UL (ref 0.3–1)
MONOCYTES NFR BLD: 11.2 % (ref 4–15)
NEUTROPHILS # BLD AUTO: 3.6 K/UL (ref 1.8–7.7)
NEUTROPHILS NFR BLD: 61.6 % (ref 38–73)
NRBC BLD-RTO: 0 /100 WBC
PHOSPHATE SERPL-MCNC: 2.6 MG/DL (ref 2.7–4.5)
PLATELET # BLD AUTO: 320 K/UL (ref 150–450)
PMV BLD AUTO: 9.8 FL (ref 9.2–12.9)
POTASSIUM SERPL-SCNC: 3.9 MMOL/L (ref 3.5–5.1)
PREALB SERPL-MCNC: 14 MG/DL (ref 20–43)
PROT SERPL-MCNC: 5.3 G/DL (ref 6–8.4)
PROTHROMBIN TIME: 20.5 SEC (ref 9–12.5)
RBC # BLD AUTO: 2.8 M/UL (ref 4.6–6.2)
SODIUM SERPL-SCNC: 135 MMOL/L (ref 136–145)
WBC # BLD AUTO: 5.87 K/UL (ref 3.9–12.7)

## 2022-10-05 PROCEDURE — 85610 PROTHROMBIN TIME: CPT | Performed by: INTERNAL MEDICINE

## 2022-10-05 PROCEDURE — 63600175 PHARM REV CODE 636 W HCPCS: Performed by: STUDENT IN AN ORGANIZED HEALTH CARE EDUCATION/TRAINING PROGRAM

## 2022-10-05 PROCEDURE — 93750 PR INTERROGATE VENT ASSIST DEV, IN PERSON, W PHYSICIAN ANALYSIS: ICD-10-PCS | Mod: ,,, | Performed by: INTERNAL MEDICINE

## 2022-10-05 PROCEDURE — 99233 SBSQ HOSP IP/OBS HIGH 50: CPT | Mod: 25,,, | Performed by: INTERNAL MEDICINE

## 2022-10-05 PROCEDURE — 25000003 PHARM REV CODE 250: Performed by: NURSE PRACTITIONER

## 2022-10-05 PROCEDURE — 93750 INTERROGATION VAD IN PERSON: CPT | Mod: ,,, | Performed by: INTERNAL MEDICINE

## 2022-10-05 PROCEDURE — A4216 STERILE WATER/SALINE, 10 ML: HCPCS | Performed by: INTERNAL MEDICINE

## 2022-10-05 PROCEDURE — 94761 N-INVAS EAR/PLS OXIMETRY MLT: CPT

## 2022-10-05 PROCEDURE — 86140 C-REACTIVE PROTEIN: CPT | Performed by: PHYSICIAN ASSISTANT

## 2022-10-05 PROCEDURE — 25000003 PHARM REV CODE 250: Performed by: INTERNAL MEDICINE

## 2022-10-05 PROCEDURE — 84100 ASSAY OF PHOSPHORUS: CPT | Performed by: PHYSICIAN ASSISTANT

## 2022-10-05 PROCEDURE — 84134 ASSAY OF PREALBUMIN: CPT | Performed by: PHYSICIAN ASSISTANT

## 2022-10-05 PROCEDURE — 25000003 PHARM REV CODE 250: Performed by: STUDENT IN AN ORGANIZED HEALTH CARE EDUCATION/TRAINING PROGRAM

## 2022-10-05 PROCEDURE — 99900035 HC TECH TIME PER 15 MIN (STAT)

## 2022-10-05 PROCEDURE — 99233 SBSQ HOSP IP/OBS HIGH 50: CPT | Mod: ,,, | Performed by: INTERNAL MEDICINE

## 2022-10-05 PROCEDURE — 25000003 PHARM REV CODE 250: Performed by: PHYSICIAN ASSISTANT

## 2022-10-05 PROCEDURE — 99233 PR SUBSEQUENT HOSPITAL CARE,LEVL III: ICD-10-PCS | Mod: ,,, | Performed by: INTERNAL MEDICINE

## 2022-10-05 PROCEDURE — 97116 GAIT TRAINING THERAPY: CPT

## 2022-10-05 PROCEDURE — 36415 COLL VENOUS BLD VENIPUNCTURE: CPT | Performed by: PHYSICIAN ASSISTANT

## 2022-10-05 PROCEDURE — 83880 ASSAY OF NATRIURETIC PEPTIDE: CPT | Performed by: NURSE PRACTITIONER

## 2022-10-05 PROCEDURE — 80076 HEPATIC FUNCTION PANEL: CPT | Performed by: NURSE PRACTITIONER

## 2022-10-05 PROCEDURE — 27000248 HC VAD-ADDITIONAL DAY

## 2022-10-05 PROCEDURE — 85025 COMPLETE CBC W/AUTO DIFF WBC: CPT | Performed by: PHYSICIAN ASSISTANT

## 2022-10-05 PROCEDURE — 63600175 PHARM REV CODE 636 W HCPCS: Performed by: PHYSICIAN ASSISTANT

## 2022-10-05 PROCEDURE — 83735 ASSAY OF MAGNESIUM: CPT | Performed by: NURSE PRACTITIONER

## 2022-10-05 PROCEDURE — 99233 PR SUBSEQUENT HOSPITAL CARE,LEVL III: ICD-10-PCS | Mod: 25,,, | Performed by: INTERNAL MEDICINE

## 2022-10-05 PROCEDURE — 85730 THROMBOPLASTIN TIME PARTIAL: CPT | Performed by: PHYSICIAN ASSISTANT

## 2022-10-05 PROCEDURE — 83615 LACTATE (LD) (LDH) ENZYME: CPT | Performed by: NURSE PRACTITIONER

## 2022-10-05 PROCEDURE — 80048 BASIC METABOLIC PNL TOTAL CA: CPT | Performed by: NURSE PRACTITIONER

## 2022-10-05 RX ORDER — OMEGA-3-ACID ETHYL ESTERS 1 G/1
2 CAPSULE, LIQUID FILLED ORAL 2 TIMES DAILY
Qty: 360 CAPSULE | Refills: 3 | Status: SHIPPED | OUTPATIENT
Start: 2022-10-05 | End: 2022-10-28 | Stop reason: SDUPTHER

## 2022-10-05 RX ORDER — ACETAMINOPHEN 500 MG
1000 TABLET ORAL 3 TIMES DAILY PRN
Refills: 0 | COMMUNITY
Start: 2022-10-05 | End: 2022-11-13 | Stop reason: ALTCHOICE

## 2022-10-05 RX ORDER — WARFARIN SODIUM 5 MG/1
5 TABLET ORAL DAILY
Status: DISCONTINUED | OUTPATIENT
Start: 2022-10-06 | End: 2022-10-05 | Stop reason: HOSPADM

## 2022-10-05 RX ORDER — WARFARIN 2.5 MG/1
2.5 TABLET ORAL ONCE
Status: COMPLETED | OUTPATIENT
Start: 2022-10-05 | End: 2022-10-05

## 2022-10-05 RX ORDER — OMEGA-3-ACID ETHYL ESTERS 1 G/1
2 CAPSULE, LIQUID FILLED ORAL 2 TIMES DAILY
Qty: 360 CAPSULE | Refills: 3
Start: 2022-10-05 | End: 2022-10-05 | Stop reason: SDUPTHER

## 2022-10-05 RX ORDER — LEVOTHYROXINE SODIUM 50 UG/1
50 TABLET ORAL
Qty: 30 TABLET | Refills: 11 | Status: SHIPPED | OUTPATIENT
Start: 2022-10-05 | End: 2022-10-28 | Stop reason: SDUPTHER

## 2022-10-05 RX ORDER — WARFARIN SODIUM 5 MG/1
TABLET ORAL
Qty: 135 TABLET | Refills: 3
Start: 2022-10-05 | End: 2022-10-28 | Stop reason: SDUPTHER

## 2022-10-05 RX ORDER — LEVOTHYROXINE SODIUM 50 UG/1
50 TABLET ORAL
Qty: 30 TABLET | Refills: 11 | Status: CANCELLED | OUTPATIENT
Start: 2022-10-06 | End: 2023-10-06

## 2022-10-05 RX ADMIN — MEXILETINE HYDROCHLORIDE 250 MG: 250 CAPSULE ORAL at 01:10

## 2022-10-05 RX ADMIN — ACETAMINOPHEN 1000 MG: 500 TABLET ORAL at 08:10

## 2022-10-05 RX ADMIN — Medication 400 MG: at 12:10

## 2022-10-05 RX ADMIN — MEXILETINE HYDROCHLORIDE 250 MG: 250 CAPSULE ORAL at 08:10

## 2022-10-05 RX ADMIN — HEPARIN SODIUM 8 UNITS/KG/HR: 10000 INJECTION, SOLUTION INTRAVENOUS at 12:10

## 2022-10-05 RX ADMIN — OXYCODONE 5 MG: 5 TABLET ORAL at 08:10

## 2022-10-05 RX ADMIN — ACETAMINOPHEN 1000 MG: 500 TABLET ORAL at 04:10

## 2022-10-05 RX ADMIN — FERROUS GLUCONATE 324 MG: 324 TABLET ORAL at 12:10

## 2022-10-05 RX ADMIN — AMIODARONE HYDROCHLORIDE 400 MG: 200 TABLET ORAL at 09:10

## 2022-10-05 RX ADMIN — LEVOTHYROXINE SODIUM 50 MCG: 50 TABLET ORAL at 05:10

## 2022-10-05 RX ADMIN — ACETAMINOPHEN 1000 MG: 500 TABLET ORAL at 09:10

## 2022-10-05 RX ADMIN — WARFARIN SODIUM 2.5 MG: 2.5 TABLET ORAL at 04:10

## 2022-10-05 RX ADMIN — Medication 10 ML: at 12:10

## 2022-10-05 RX ADMIN — PANTOPRAZOLE SODIUM 40 MG: 40 TABLET, DELAYED RELEASE ORAL at 12:10

## 2022-10-05 RX ADMIN — Medication 10 ML: at 05:10

## 2022-10-05 RX ADMIN — MEXILETINE HYDROCHLORIDE 250 MG: 250 CAPSULE ORAL at 05:10

## 2022-10-05 RX ADMIN — ERTAPENEM 1 G: 1 INJECTION INTRAMUSCULAR; INTRAVENOUS at 12:10

## 2022-10-05 RX ADMIN — Medication 10 ML: at 06:10

## 2022-10-05 NOTE — PROGRESS NOTES
[facility] - Cardiology Stepdown  Heart Transplant  Progress Note    Patient Name: Tim Richards  MRN: [id number]  Admission Date: 9/24/2022  Hospital Length of Stay: 11 days  Attending Physician: Roslyn Ragsdale DO  Primary Care Provider: Deyanira Booth MD  Principal Problem:Pseudoaneurysm of femoral artery    Subjective:     Interval History: No acute events overnight.  Patient was stable and feels well.  No fevers, nausea, vomiting, shortness of breath.  No low flow alarms or power spikes, had a few PI events on monitor upon LVAD interrogation.      Intake: 662  Output: 967.5  Net -300.5    Continuous Infusions:  Scheduled Meds:   acetaminophen  1,000 mg Oral TID    amiodarone  400 mg Oral Daily    ertapenem (INVANZ) IVPB  1 g Intravenous Q24H    ferrous gluconate  324 mg Oral with lunch    levothyroxine  50 mcg Oral Before breakfast    magnesium oxide  400 mg Oral Daily with lunch    mexiletine  250 mg Oral Q8H    mirtazapine  30 mg Oral QHS    pantoprazole  40 mg Oral Daily with lunch    polyethylene glycol  17 g Oral Daily    sodium chloride 0.9%  10 mL Intravenous Q6H    warfarin  2.5 mg Oral Once    [START ON 10/6/2022] warfarin  5 mg Oral Daily     PRN Meds:sodium chloride, sodium chloride, sodium chloride 0.9%, sodium chloride 0.9%, ALPRAZolam, bisacodyL, calcium carbonate, ondansetron, oxyCODONE, oxyCODONE, Flushing PICC Protocol **AND** sodium chloride 0.9% **AND** sodium chloride 0.9%, zolpidem    Review of patient's allergies indicates:   Allergen Reactions    Lisinopril Anaphylaxis    Hydralazine analogues      Chronic constipation, impotence, dizziness     Objective:     Vital Signs (Most Recent):  Temp: 98.1 °F (36.7 °C) (10/05/22 1200)  Pulse: 68 (10/05/22 1239)  Resp: 18 (10/05/22 1200)  BP: (!) 86/64 (10/05/22 1205)  SpO2: 95 % (10/05/22 1219)   Vital Signs (24h Range):  Temp:  [97.5 °F (36.4 °C)-98.5 °F (36.9 °C)] 98.1 °F (36.7 °C)  Pulse:  [68-77] 68  Resp:  [14-18]  18  SpO2:  [94 %-100 %] 95 %  BP: (68-98)/(0-66) 86/64     Patient Vitals for the past 72 hrs (Last 3 readings):   Weight   10/04/22 0750 89.4 kg (197 lb 1.5 oz)   10/03/22 0500 89.9 kg (198 lb 3.1 oz)     Body mass index is 26 kg/m².      Intake/Output Summary (Last 24 hours) at 10/5/2022 1430  Last data filed at 10/5/2022 1248  Gross per 24 hour   Intake 564 ml   Output 882.5 ml   Net -318.5 ml       Hemodynamic Parameters:       Telemetry: No acute events on telemetry monitor    Physical Exam  Constitutional:       Appearance: Normal appearance.   HENT:      Head: Normocephalic and atraumatic.   Eyes:      Extraocular Movements: Extraocular movements intact.      Pupils: Pupils are equal, round, and reactive to light.   Neck:      Comments: Neck veins are not elevated  Cardiovascular:      Rate and Rhythm: Normal rate and regular rhythm.      Comments: Smooth VAD hum  Pulmonary:      Effort: Pulmonary effort is normal.      Breath sounds: Normal breath sounds.   Abdominal:      General: Bowel sounds are normal. There is no distension.      Palpations: Abdomen is soft.      Tenderness: There is no abdominal tenderness.      Comments: DLES dressing c/d/i   Musculoskeletal:         General: No swelling. Normal range of motion.      Cervical back: Normal range of motion and neck supple.      Right lower leg: No edema.      Left lower leg: No edema.      Comments: R groin with wound vac and CLEO drain. No drainage or bleeding.    Skin:     General: Skin is warm and dry.      Capillary Refill: Capillary refill takes 2 to 3 seconds.   Neurological:      General: No focal deficit present.      Mental Status: He is alert and oriented to person, place, and time.   Psychiatric:         Mood and Affect: Mood normal.         Behavior: Behavior normal.         Thought Content: Thought content normal.         Judgment: Judgment normal.       Significant Labs:  CBC:  Recent Labs   Lab 10/04/22  0348 10/04/22  0942 10/05/22  0510    WBC 7.33 6.76 5.87   RBC 2.83* 2.86* 2.80*   HGB 7.6* 7.8* 7.5*   HCT 25.7* 25.8* 25.5*    290 320   MCV 91 90 91   MCH 26.9* 27.3 26.8*   MCHC 29.6* 30.2* 29.4*     BNP:  Recent Labs   Lab 09/30/22  0546 10/03/22  0513 10/05/22  0510   * 291* 201*     CMP:  Recent Labs   Lab 10/03/22  0513 10/04/22  0348 10/05/22  0510   GLU 64* 73 90   CALCIUM 8.4* 8.5* 8.1*   ALBUMIN 1.8* 1.9* 1.9*   PROT 5.3* 5.5* 5.3*   * 135* 135*   K 3.7 3.8 3.9   CO2 25 26 24    103 104   BUN 12 11 10   CREATININE 1.1 1.0 1.1   ALKPHOS 85 81 79   ALT 20 16 12   AST 34 22 17   BILITOT 0.4 0.4 0.3      Coagulation:   Recent Labs   Lab 10/03/22  0513 10/03/22  1325 10/04/22  0348 10/04/22  0942 10/05/22  0510 10/05/22  0932   INR 1.5*  --  1.7*  --   --  2.1*   APTT 54.1*   < > 46.4* 45.3* 54.0*  --     < > = values in this interval not displayed.     LDH:  Recent Labs   Lab 10/03/22  0513 10/04/22  0348 10/05/22  0510    232 241     Microbiology:  Microbiology Results (last 7 days)       Procedure Component Value Units Date/Time    Fungus culture [[id number]] Collected: 09/27/22 0944    Order Status: Completed Specimen: Wound from Groin Updated: 10/05/22 1151     Fungus (Mycology) Culture Culture in progress    Narrative:      1. RIGHT GROIN WOUND    Fungus culture [[id number]] Collected: 09/27/22 0944    Order Status: Completed Specimen: Wound from Groin Updated: 10/05/22 1151     Fungus (Mycology) Culture Culture in progress    Narrative:      2. RIGHT FEMORAL ARTERY    Fungus culture [[id number]] Collected: 09/27/22 0944    Order Status: Completed Specimen: Wound from Groin Updated: 10/05/22 1151     Fungus (Mycology) Culture Culture in progress    Narrative:      3. RIGHT FEMORAL GRAFT    Fungus culture [[id number]]  (Abnormal) Collected: 09/27/22 0944    Order Status: Completed Specimen: Wound from Groin Updated: 10/05/22 1109     Fungus (Mycology) Culture FUNGUS  Identification pending      Narrative:       4. FEMORAL TISSUE RIGHT GROIN    Fungus culture [[id number]] Collected: 09/25/22 0815    Order Status: Completed Specimen: Wound from Groin Updated: 10/05/22 0923     Fungus (Mycology) Culture Culture in progress      No fungus isolated after 2 weeks    Culture, Anaerobe [[id number]] Collected: 09/27/22 0944    Order Status: Completed Specimen: Wound from Groin Updated: 10/04/22 0932     Anaerobic Culture No anaerobes isolated    Narrative:      3. RIGHT FEMORAL GRAFT    Culture, Anaerobe [[id number]] Collected: 09/27/22 0944    Order Status: Completed Specimen: Wound from Groin Updated: 10/04/22 0932     Anaerobic Culture No anaerobes isolated    Narrative:      4. FEMORAL TISSUE RIGHT GROIN    Culture, Anaerobe [[id number]] Collected: 09/27/22 0944    Order Status: Completed Specimen: Wound from Groin Updated: 10/03/22 0959     Anaerobic Culture No anaerobes isolated    Narrative:      2. RIGHT FEMORAL ARTERY    Culture, Anaerobe [[id number]] Collected: 09/27/22 0944    Order Status: Completed Specimen: Wound from Groin Updated: 10/03/22 0959     Anaerobic Culture No anaerobes isolated    Narrative:      1. RIGHT GROIN WOUND    Aerobic culture [[id number]]  (Abnormal)  (Susceptibility) Collected: 09/27/22 0944    Order Status: Completed Specimen: Wound from Groin Updated: 10/02/22 1020     Aerobic Bacterial Culture ESCHERICHIA COLI ESBL  Moderate        PROTEUS MIRABILIS  Rare      Narrative:      1. RIGHT GROIN WOUND    Blood culture [[id number]] Collected: 09/25/22 0138    Order Status: Completed Specimen: Blood from Peripheral, Hand, Right Updated: 09/30/22 0612     Blood Culture, Routine No growth after 5 days.    Narrative:      Collection has been rescheduled by TR3 at 09/25/2022 00:31 Reason: Pt   dont wanna get stuck rn sharona  Collection has been rescheduled by TR3 at 09/25/2022 00:31 Reason: Pt   dont wanna get stuck rn sharona    Blood culture [[id number]] Collected: 09/25/22 0133    Order Status: Completed  Specimen: Blood from Peripheral, Lower Arm, Left Updated: 09/30/22 0612     Blood Culture, Routine No growth after 5 days.    Narrative:      Collection has been rescheduled by TR3 at 09/25/2022 00:31 Reason: Pt   dont wanna get stuck rn sharona  Collection has been rescheduled by TR3 at 09/25/2022 00:31 Reason: Pt   dont wanna get stuck rn bernardalle    Aerobic culture [[id number]]  (Abnormal)  (Susceptibility) Collected: 09/27/22 0944    Order Status: Completed Specimen: Wound from Groin Updated: 09/29/22 1300     Aerobic Bacterial Culture ESCHERICHIA COLI  Rare      Narrative:      4. FEMORAL TISSUE RIGHT GROIN    Aerobic culture [[id number]]  (Abnormal)  (Susceptibility) Collected: 09/27/22 0944    Order Status: Completed Specimen: Wound from Groin Updated: 09/29/22 1259     Aerobic Bacterial Culture ESCHERICHIA COLI ESBL  Rare      Narrative:      3. RIGHT FEMORAL GRAFT    Aerobic culture [[id number]]  (Abnormal)  (Susceptibility) Collected: 09/27/22 0944    Order Status: Completed Specimen: Wound from Groin Updated: 09/29/22 1248     Aerobic Bacterial Culture CITROBACTER FARMERI  Rare      Narrative:      2. RIGHT FEMORAL ARTERY    Culture, Anaerobe [[id number]] Collected: 09/25/22 0815    Order Status: Completed Specimen: Wound from Groin Updated: 09/29/22 0932     Anaerobic Culture No anaerobes isolated    AFB Culture & Smear [[id number]] Collected: 09/27/22 0944    Order Status: Completed Specimen: Wound from Groin Updated: 09/29/22 0927     AFB Culture & Smear Culture in progress     AFB CULTURE STAIN No acid fast bacilli seen.    Narrative:      1. RIGHT GROIN WOUND    AFB Culture & Smear [[id number]] Collected: 09/27/22 0944    Order Status: Completed Specimen: Wound from Groin Updated: 09/28/22 2127     AFB Culture & Smear Culture in progress     AFB CULTURE STAIN No acid fast bacilli seen.    Narrative:      2. RIGHT FEMORAL ARTERY    AFB Culture & Smear [[id number]] Collected: 09/27/22 0944    Order Status:  Completed Specimen: Wound from Groin Updated: 09/28/22 2127     AFB Culture & Smear Culture in progress     AFB CULTURE STAIN No acid fast bacilli seen.    Narrative:      3. RIGHT FEMORAL GRAFT    AFB Culture & Smear [[id number]] Collected: 09/27/22 0944    Order Status: Completed Specimen: Wound from Groin Updated: 09/28/22 2127     AFB Culture & Smear Culture in progress     AFB CULTURE STAIN No acid fast bacilli seen.    Narrative:      4. FEMORAL TISSUE RIGHT GROIN            I have reviewed all pertinent labs within the past 24 hours.    Estimated Creatinine Clearance: 85.8 mL/min (based on SCr of 1.1 mg/dL).    Diagnostic Results:  I have reviewed all pertinent imaging results/findings within the past 24 hours.    Assessment and Plan:     Tim Richards is a 56 y.o.  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  His post operative course was complicated by cardiogenic shock/RV failure requiring VA-ECMO.  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  He has a 8 Fr cuffed Shiley trach.  He had a prolonged and complicated hospital course from 6/24/22 - 9/4/22, was discharged to rehab (see clinic visit from 9/22/22 for additional details).  Of note he was last seen in clinic by Dr. Ragsdale on 9/22/22 after being discharged from rehab and at the time was noted to have purulence from the groin site and was placed on doxycycline PO.  He has had complaints of fatigue since his last hospitalization, per his wife.  This morning he was sitting and was crossing his legs for a prolonged period of time, and when he uncrossed them he noticed a pool of blood under him and active bleeding from his R groin where his previous ECMO cannula was.  He began to feel lightheaded when he tried walking.  Per wife he had 2  low flow alarms at home.      In the ED, patient stated he felt at his baseline and had no complaints.  No active bleeding at the time of my evaluation from R groin site.  Doppler BP was 80/0.  Hgb dropped from 9.4 -> 8.9 over the past 2 days.  INR was 3.0.  CT Angio performed per CV surgery recommendations.  HTS was consulted for admission, CT surgery was consulted in the ED for evaluation of groin hematoma.       Cardiovascular Studies  TTE 8/30/22   There is an LVAD present. Base speed is 6300 RPMs. The pump type is a Heartmate III. The interventricular septum appears midline. The aortic valve does not open.   The left ventricle is severely enlarged with eccentric hypertrophy and severely decreased systolic function.   The estimated ejection fraction is 10%.   There is left ventricular global hypokinesis.   Left ventricular diastolic dysfunction.   There is trivial continuous aortic regurgitation.   There is abnormal septal wall motion.   The right ventricle is poorly seen, but appears enlarged with reduced systolic function.   Mild tricuspid regurgitation.      * Pseudoaneurysm of femoral artery  -Vascular surgery consulted, appreciate recommendations  -CTA shows 3 new pseudoaneurysms of R femoral artery  -S/p  Right Ileofemoral bypass 9/27/22 (end to end). Distal External Iliac to distal femoral artery  -Wound vac in place     Tracheostomy present  Consult placed to gen surg. Dr landin to decannulate his trach ahead of d/c.     Groin hematoma  -Was on VA-ECMO at last hospitalization with successful decannulation  -CTA revealed 3 possible new pseudoaneurysms  -INR 3 on admission, since reversed with PO vit K and FFP.  Holding A/C until further instruction from surgical team regarding resumption of A/C.   -Hgb was trending down since admit. Transfusing to keep Hgb > 10  -Cultures from right groin wound with ESBL E Coli, per ID hold dapto and continue on ertapenem for total of 6 weeks, continue with  outpatient antibiotics    VT (ventricular tachycardia)  -Patient has [employer] SICD  -Currently on amiodarone 400mg qd and mexiletine 250mg TID, will continue     LVAD (left ventricular assist device) present  -S/P S/P DT HM2 3/8/2018 then S/P DT HM3 exchange 7/13/2022 2/2 to thrombosis of HM2 thought caused by COVID PNA  -Current speed 6400  -Last TTE done 8/30/2022 at 6300: LVEDD 7.44, LVEF 10%, RV appears enlarged and decreased, trace AI, trace MR, mild TR, PAS > 28, IVC 3, AV does not open, septum midline  -restarted coumadin post op and minimally intense heparin gtt. Goal INR 2.0-3.0.  Subtherapeutic today.  Previous home dose was 5mg Qdaily   -LDH is stable    Procedure: Device Interrogation Including analysis of device parameters  Current Settings: Ventricular Assist Device  Review of device function is stable    TXP LVAD INTERROGATIONS 10/5/2022 10/5/2022 10/5/2022 10/5/2022 10/4/2022 10/4/2022 10/4/2022   Type HeartMate3 HeartMate3 HeartMate3 HeartMate3 HeartMate3 HeartMate3 HeartMate3   Flow 5.4 5.5 5.4 5.4 5.4 5.4 5.6   Speed 6400 6400 6400 6400 6400 6400 6400   PI 2.0 1.5 2.5 2.8 1.8 1.5 1.8   Power ([place]) 5.1 5.5 5.5 5.5 5.5 5.5 5.5   LSL 6000 6000 6000 6000 6000 6000 6000   Low Flow Alarm - - - - - - -   High Power Alarm - - - - - - -   Pulsatility Intermittent pulse Pulse Pulse - - - Intermittent pulse         Hypertension  -BP controlled    Chronic combined systolic and diastolic congestive heart failure  -NICM   -Echo 8/30/22 EF: 10%, LVEDD: 7.44  -Patient was on Torsemide prior to admit: 40mg Qdaily    -GDMT: N/A  -Euvolemic on exam, auto diuresing well at this time.   -hold diuresis, can discharge with torsemide PRN.   -Maintain K>4.0, Mg>2.0        Nic Brian, MD  Heart Transplant  [facility] - Cardiology Stepdown

## 2022-10-05 NOTE — PROGRESS NOTES
Update:  Received completed [employer] forms for wound vac.  Faxed forms and supportive documentation to [facility] to begin order and authorization.  [employer] advised pt can be dc once approved.   SW will follow.

## 2022-10-05 NOTE — PT/OT/SLP PROGRESS
Physical Therapy  Treatment    Patient Name:  Tim Richards   MRN:  [id number]    Recommendations:     Discharge Recommendations:  home health PT   Discharge Equipment Recommendations: none   Barriers to discharge: decreased functional mobility, fall risk, decreased caregiver support, and inaccessible home    Assessment:     Tim Richards is a 56 y.o. male admitted with a medical diagnosis of Pseudoaneurysm of right femoral artery.  Pt demonstrates the below listed impairments with decreased tolerance to functional mobility, weakness, and pain being the most limiting.  Pt demonstrates fair tolerance to out of bed mobility and is willing to ambulate in the room.  He remains on battery power.  Pt requires skilled PT due to patient's status as: a fall risk and patient has increased pain to allow safe d/c home.     Impairments and functional limitations:  weakness, impaired endurance, impaired self care skills, impaired functional mobility, gait instability, impaired balance, decreased coordination, decreased upper extremity function, decreased lower extremity function, pain, impaired skin, impaired cardiopulmonary response to activity.  These deficits affect their roles and responsibilities in which they were able to complete prior to admit.  Rehab Prognosis:   Good ; patient would benefit from acute skilled PT services 4 x/week to address these deficits, improve quality of life, focus on recovery of impairments, provide patient/caregiver education, reduce fall risk, and reach maximum level of function.  Pt is highly  motivated to participated in skilled PT.    Recent Surgery:   Procedure(s) (LRB):  CREATION, BYPASS, ARTERIAL, ILIAC TO FEMORAL WITH GRAFT (Right)  CREATION, FLAP, MUSCLE ROTATION, SARTORIUS AND RECTUS FEMORIS (Right)  APPLICATION, WOUND VAC (Right) 8 Days Post-Op    Plan:     During this hospitalization, patient to be seen 4 x/week to address the identified rehab impairments via therapeutic  "activities, gait training, therapeutic exercises, neuromuscular re-education and progress toward the following goals:    Plan of Care Expires:  10/30/22    Subjective     Chief Complaint: "it's always good to do some therapy"  Patient/Family Comments/Goals: Return home  Pain/Comfort:  Pain Rating 1: other (see comments) (not rated)  Location - Side 1: Right  Location - Orientation 1: generalized  Location 1: thigh  Pain Addressed 1: Reposition, Distraction, Cessation of Activity  Pain Rating Post-Intervention 1: other (see comments) (not rated)    Objective:     Communicated with RN prior to session.  Patient found HOB elevated with LVAD, telemetry, wound vac, PICC line upon PT entry to room.     General Precautions: Standard, fall, LVAD, contact   Orthopedic Precautions:N/A   Braces: N/A  Oxygen Device:      Functional Mobility:  Bed Mobility:  Rolling Left: Sup  Scooting: Sup  Supine to Sit: Sup  Sit to Supine: CGA  Head of bed position: HOB elevated    Transfers:  Sit to Stand: SBA with RW and with cues for hand placement  Performs x2 sit to stands     Gait: Patient ambulated 5' and 32' with RW and SBA. Patient demonstrates decreased step length, flexed posture, and decreased arminda. All lines remained intact throughout ambulation trial.  Remains on wall power  Some lightheadedness, SpO2 93%    Balance:   Position Score Time   Static Sitting GOOD: Takes MODERATE challenges n/a   Dynamic Sitting GOOD-: Maintains balance through MODERATE excursions of active trunk motion, but inconstantly  n/a   Static Standing FAIR+: Takes MINIMAL challenges n/a   Dynamic Standing FAIR-: Maintains without assist but is inconsistent n/a       AM-PAC 6 CLICK MOBILITY  Turning over in bed (including adjusting bedclothes, sheets and blankets)?: 3  Sitting down on and standing up from a chair with arms (e.g., wheelchair, bedside commode, etc.): 3  Moving from lying on back to sitting on the side of the bed?: 3  Moving to and from a " bed to a chair (including a wheelchair)?: 3  Need to walk in hospital room?: 3  Climbing 3-5 steps with a railing?: 3  Basic Mobility Total Score: 18     Therapeutic Activities:  Patient educated on role of acute care PT and PT POC, safety while in hospital including calling nurse for mobility, call light usage, benefits of out of bed mobility, walker management, home exercise program , breathing technique, fall risk, assistive device use, bed mobility , transfers, gait technique, positioning, posture, risks of prolonged bed rest, possible discharge disposition , and benefits of continued PT by explanation and demonstration.    Patient demonstrates good understanding of education provided this day.   Whiteboard updated    Therapeutic Exercises:  Patient participated in: 1x20 standing marches     Patient left HOB elevated with all lines intact, call button in reach, and RN notified.  Declined sitting in chair.    GOALS:   Multidisciplinary Problems       Physical Therapy Goals          Problem: Physical Therapy    Goal Priority Disciplines Outcome Goal Variances Interventions   Physical Therapy Goal     PT, PT/OT Ongoing, Progressing     Description: Goals to be met by: 10/14/2022     Patient will increase functional independence with mobility by performin. Supine to sit with independence  2. Sit to supine with independence  3. Sit to stand transfer with supervision  4. Gait  x 200  feet with supervision using LRAD as needed  5. Ascend/descend 4 stair with no handrails stand by assistance using LRAD as needed  6. Lower extremity exercise program x10 reps per handout, with independence                        Time Tracking:     PT Received On: 10/05/22  PT Start Time: 852     PT Stop Time: 09  PT Total Time (min): 17 min     Billable Minutes: Gait Training 17    Treatment Type: Treatment  PT/PTA: PT     PTA Visit Number: 0     10/05/2022

## 2022-10-05 NOTE — PLAN OF CARE
Pt maintained free from falls/trauma/injuries and skin breakdown. Pt denies any pain. No alarms noted overnight and LVAD hum smooth. Heparin going at 8 units/kg/hr. Lab drawn. Tums given with mexiletine for stomach discomfort. CHG bath provided. Contact precaution and delirium precaution maintained. Plan of care reviewed. Pt verbalized understanding. All questions and concerns addressed. Will continue to monitor.

## 2022-10-05 NOTE — ASSESSMENT & PLAN NOTE
-Was on VA-ECMO at last hospitalization with successful decannulation  -CTA revealed 3 possible new pseudoaneurysms  -INR 3 on admission, since reversed with PO vit K and FFP.  Holding A/C until further instruction from surgical team regarding resumption of A/C.   -Hgb was trending down since admit. Transfusing to keep Hgb > 10  -Cultures from right groin wound with ESBL E Coli, per ID hold dapto and continue on ertapenem for total of 6 weeks, continue with outpatient antibiotics

## 2022-10-05 NOTE — ASSESSMENT & PLAN NOTE
-NICM   -Echo 8/30/22 EF: 10%, LVEDD: 7.44  -Patient was on Torsemide prior to admit: 40mg Qdaily    -GDMT: N/A  -Euvolemic on exam, auto diuresing well at this time.   -hold diuresis, can discharge with torsemide PRN.   -Maintain K>4.0, Mg>2.0

## 2022-10-05 NOTE — PT/OT/SLP PROGRESS
Occupational Therapy      Patient Name:  Tim Richards   MRN:  [id number]    Patient not seen today secondary to politely declining anticipating discharge home this date, expressing would like to wait. Will follow-up on next available date should discharge not occur as planned.    10/5/2022

## 2022-10-05 NOTE — PROGRESS NOTES
Discharge Note:    IV antibiotic delivered to room by [facility].  [facility] to deliver wound vac for 6pm today per Love with [facility].  [facility] contacted with dc plans today.    Team updated on dc plans being set up and pt is able to be dc as planned.  No further needs indicated at this time.     [facility]: [phone]  [facility]: [phone]  [facility]: [phone]

## 2022-10-05 NOTE — PLAN OF CARE
VSS. LVAD number and doppler WNL. LVAD dressing change CDI. Pt refused VAD dressing change today. Heparin gtt discontinued  during the day. Waiting wound vac to be delivered. Pt educated on fall risk and remained free from falls/trauma/injury. Denies chest pain, SOB, palpitations, dizziness, pain, or discomfort. Plan of care reviewed with pt, all questions answered. Bed locked in lowest position, call bell within reach, no acute distress noted, will continue to monitor.

## 2022-10-05 NOTE — SUBJECTIVE & OBJECTIVE
Interval History: No acute events overnight.  Patient was stable and feels well.  No fevers, nausea, vomiting, shortness of breath.  No low flow alarms or power spikes, had a few PI events on monitor upon LVAD interrogation.      Intake: 662  Output: 967.5  Net -300.5    Continuous Infusions:  Scheduled Meds:   acetaminophen  1,000 mg Oral TID    amiodarone  400 mg Oral Daily    ertapenem (INVANZ) IVPB  1 g Intravenous Q24H    ferrous gluconate  324 mg Oral with lunch    levothyroxine  50 mcg Oral Before breakfast    magnesium oxide  400 mg Oral Daily with lunch    mexiletine  250 mg Oral Q8H    mirtazapine  30 mg Oral QHS    pantoprazole  40 mg Oral Daily with lunch    polyethylene glycol  17 g Oral Daily    sodium chloride 0.9%  10 mL Intravenous Q6H    warfarin  2.5 mg Oral Once    [START ON 10/6/2022] warfarin  5 mg Oral Daily     PRN Meds:sodium chloride, sodium chloride, sodium chloride 0.9%, sodium chloride 0.9%, ALPRAZolam, bisacodyL, calcium carbonate, ondansetron, oxyCODONE, oxyCODONE, Flushing PICC Protocol **AND** sodium chloride 0.9% **AND** sodium chloride 0.9%, zolpidem    Review of patient's allergies indicates:   Allergen Reactions    Lisinopril Anaphylaxis    Hydralazine analogues      Chronic constipation, impotence, dizziness     Objective:     Vital Signs (Most Recent):  Temp: 98.1 °F (36.7 °C) (10/05/22 1200)  Pulse: 68 (10/05/22 1239)  Resp: 18 (10/05/22 1200)  BP: (!) 86/64 (10/05/22 1205)  SpO2: 95 % (10/05/22 1219)   Vital Signs (24h Range):  Temp:  [97.5 °F (36.4 °C)-98.5 °F (36.9 °C)] 98.1 °F (36.7 °C)  Pulse:  [68-77] 68  Resp:  [14-18] 18  SpO2:  [94 %-100 %] 95 %  BP: (68-98)/(0-66) 86/64     Patient Vitals for the past 72 hrs (Last 3 readings):   Weight   10/04/22 0750 89.4 kg (197 lb 1.5 oz)   10/03/22 0500 89.9 kg (198 lb 3.1 oz)     Body mass index is 26 kg/m².      Intake/Output Summary (Last 24 hours) at 10/5/2022 1430  Last data filed at 10/5/2022 1248  Gross per 24 hour    Intake 564 ml   Output 882.5 ml   Net -318.5 ml       Hemodynamic Parameters:       Telemetry: No acute events on telemetry monitor    Physical Exam  Constitutional:       Appearance: Normal appearance.   HENT:      Head: Normocephalic and atraumatic.   Eyes:      Extraocular Movements: Extraocular movements intact.      Pupils: Pupils are equal, round, and reactive to light.   Neck:      Comments: Neck veins are not elevated  Cardiovascular:      Rate and Rhythm: Normal rate and regular rhythm.      Comments: Smooth VAD hum  Pulmonary:      Effort: Pulmonary effort is normal.      Breath sounds: Normal breath sounds.   Abdominal:      General: Bowel sounds are normal. There is no distension.      Palpations: Abdomen is soft.      Tenderness: There is no abdominal tenderness.      Comments: DLES dressing c/d/i   Musculoskeletal:         General: No swelling. Normal range of motion.      Cervical back: Normal range of motion and neck supple.      Right lower leg: No edema.      Left lower leg: No edema.      Comments: R groin with wound vac and CLEO drain. No drainage or bleeding.    Skin:     General: Skin is warm and dry.      Capillary Refill: Capillary refill takes 2 to 3 seconds.   Neurological:      General: No focal deficit present.      Mental Status: He is alert and oriented to person, place, and time.   Psychiatric:         Mood and Affect: Mood normal.         Behavior: Behavior normal.         Thought Content: Thought content normal.         Judgment: Judgment normal.       Significant Labs:  CBC:  Recent Labs   Lab 10/04/22  0348 10/04/22  0942 10/05/22  0510   WBC 7.33 6.76 5.87   RBC 2.83* 2.86* 2.80*   HGB 7.6* 7.8* 7.5*   HCT 25.7* 25.8* 25.5*    290 320   MCV 91 90 91   MCH 26.9* 27.3 26.8*   MCHC 29.6* 30.2* 29.4*     BNP:  Recent Labs   Lab 09/30/22  0546 10/03/22  0513 10/05/22  0510   * 291* 201*     CMP:  Recent Labs   Lab 10/03/22  0513 10/04/22  0348 10/05/22  0510   GLU 64* 73  90   CALCIUM 8.4* 8.5* 8.1*   ALBUMIN 1.8* 1.9* 1.9*   PROT 5.3* 5.5* 5.3*   * 135* 135*   K 3.7 3.8 3.9   CO2 25 26 24    103 104   BUN 12 11 10   CREATININE 1.1 1.0 1.1   ALKPHOS 85 81 79   ALT 20 16 12   AST 34 22 17   BILITOT 0.4 0.4 0.3      Coagulation:   Recent Labs   Lab 10/03/22  0513 10/03/22  1325 10/04/22  0348 10/04/22  0942 10/05/22  0510 10/05/22  0932   INR 1.5*  --  1.7*  --   --  2.1*   APTT 54.1*   < > 46.4* 45.3* 54.0*  --     < > = values in this interval not displayed.     LDH:  Recent Labs   Lab 10/03/22  0513 10/04/22  0348 10/05/22  0510    232 241     Microbiology:  Microbiology Results (last 7 days)       Procedure Component Value Units Date/Time    Fungus culture [[id number]] Collected: 09/27/22 0944    Order Status: Completed Specimen: Wound from Groin Updated: 10/05/22 1151     Fungus (Mycology) Culture Culture in progress    Narrative:      1. RIGHT GROIN WOUND    Fungus culture [[id number]] Collected: 09/27/22 0944    Order Status: Completed Specimen: Wound from Groin Updated: 10/05/22 1151     Fungus (Mycology) Culture Culture in progress    Narrative:      2. RIGHT FEMORAL ARTERY    Fungus culture [[id number]] Collected: 09/27/22 0944    Order Status: Completed Specimen: Wound from Groin Updated: 10/05/22 1151     Fungus (Mycology) Culture Culture in progress    Narrative:      3. RIGHT FEMORAL GRAFT    Fungus culture [[id number]]  (Abnormal) Collected: 09/27/22 0944    Order Status: Completed Specimen: Wound from Groin Updated: 10/05/22 1109     Fungus (Mycology) Culture FUNGUS  Identification pending      Narrative:      4. FEMORAL TISSUE RIGHT GROIN    Fungus culture [[id number]] Collected: 09/25/22 0815    Order Status: Completed Specimen: Wound from Groin Updated: 10/05/22 0923     Fungus (Mycology) Culture Culture in progress      No fungus isolated after 2 weeks    Culture, Anaerobe [[id number]] Collected: 09/27/22 0944    Order Status: Completed Specimen: Wound  from Groin Updated: 10/04/22 0932     Anaerobic Culture No anaerobes isolated    Narrative:      3. RIGHT FEMORAL GRAFT    Culture, Anaerobe [[id number]] Collected: 09/27/22 0944    Order Status: Completed Specimen: Wound from Groin Updated: 10/04/22 0932     Anaerobic Culture No anaerobes isolated    Narrative:      4. FEMORAL TISSUE RIGHT GROIN    Culture, Anaerobe [[id number]] Collected: 09/27/22 0944    Order Status: Completed Specimen: Wound from Groin Updated: 10/03/22 0959     Anaerobic Culture No anaerobes isolated    Narrative:      2. RIGHT FEMORAL ARTERY    Culture, Anaerobe [[id number]] Collected: 09/27/22 0944    Order Status: Completed Specimen: Wound from Groin Updated: 10/03/22 0959     Anaerobic Culture No anaerobes isolated    Narrative:      1. RIGHT GROIN WOUND    Aerobic culture [[id number]]  (Abnormal)  (Susceptibility) Collected: 09/27/22 0944    Order Status: Completed Specimen: Wound from Groin Updated: 10/02/22 1020     Aerobic Bacterial Culture ESCHERICHIA COLI ESBL  Moderate        PROTEUS MIRABILIS  Rare      Narrative:      1. RIGHT GROIN WOUND    Blood culture [[id number]] Collected: 09/25/22 0138    Order Status: Completed Specimen: Blood from Peripheral, Hand, Right Updated: 09/30/22 0612     Blood Culture, Routine No growth after 5 days.    Narrative:      Collection has been rescheduled by TR3 at 09/25/2022 00:31 Reason: Pt   dont wanna get stuck rn rachille  Collection has been rescheduled by TR3 at 09/25/2022 00:31 Reason: Pt   dont wanna get stuck rn rachille    Blood culture [[id number]] Collected: 09/25/22 0133    Order Status: Completed Specimen: Blood from Peripheral, Lower Arm, Left Updated: 09/30/22 0612     Blood Culture, Routine No growth after 5 days.    Narrative:      Collection has been rescheduled by TR3 at 09/25/2022 00:31 Reason: Pt   dont wanna get stuck rn rachille  Collection has been rescheduled by TR3 at 09/25/2022 00:31 Reason: Pt   dont wanna get stuck rn  bernardalle    Aerobic culture [[id number]]  (Abnormal)  (Susceptibility) Collected: 09/27/22 0944    Order Status: Completed Specimen: Wound from Groin Updated: 09/29/22 1300     Aerobic Bacterial Culture ESCHERICHIA COLI  Rare      Narrative:      4. FEMORAL TISSUE RIGHT GROIN    Aerobic culture [[id number]]  (Abnormal)  (Susceptibility) Collected: 09/27/22 0944    Order Status: Completed Specimen: Wound from Groin Updated: 09/29/22 1259     Aerobic Bacterial Culture ESCHERICHIA COLI ESBL  Rare      Narrative:      3. RIGHT FEMORAL GRAFT    Aerobic culture [[id number]]  (Abnormal)  (Susceptibility) Collected: 09/27/22 0944    Order Status: Completed Specimen: Wound from Groin Updated: 09/29/22 1248     Aerobic Bacterial Culture CITROBACTER FARMERI  Rare      Narrative:      2. RIGHT FEMORAL ARTERY    Culture, Anaerobe [[id number]] Collected: 09/25/22 0815    Order Status: Completed Specimen: Wound from Groin Updated: 09/29/22 0932     Anaerobic Culture No anaerobes isolated    AFB Culture & Smear [[id number]] Collected: 09/27/22 0944    Order Status: Completed Specimen: Wound from Groin Updated: 09/29/22 0927     AFB Culture & Smear Culture in progress     AFB CULTURE STAIN No acid fast bacilli seen.    Narrative:      1. RIGHT GROIN WOUND    AFB Culture & Smear [[id number]] Collected: 09/27/22 0944    Order Status: Completed Specimen: Wound from Groin Updated: 09/28/22 2127     AFB Culture & Smear Culture in progress     AFB CULTURE STAIN No acid fast bacilli seen.    Narrative:      2. RIGHT FEMORAL ARTERY    AFB Culture & Smear [[id number]] Collected: 09/27/22 0944    Order Status: Completed Specimen: Wound from Groin Updated: 09/28/22 2127     AFB Culture & Smear Culture in progress     AFB CULTURE STAIN No acid fast bacilli seen.    Narrative:      3. RIGHT FEMORAL GRAFT    AFB Culture & Smear [[id number]] Collected: 09/27/22 0944    Order Status: Completed Specimen: Wound from Groin Updated: 09/28/22 2127     AFB  Culture & Smear Culture in progress     AFB CULTURE STAIN No acid fast bacilli seen.    Narrative:      4. FEMORAL TISSUE RIGHT GROIN            I have reviewed all pertinent labs within the past 24 hours.    Estimated Creatinine Clearance: 85.8 mL/min (based on SCr of 1.1 mg/dL).    Diagnostic Results:  I have reviewed all pertinent imaging results/findings within the past 24 hours.

## 2022-10-05 NOTE — ASSESSMENT & PLAN NOTE
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 Goal INR 2.0-3.0.  Subtherapeutic today.  Previous home dose was 5mg Qdaily   -LDH is stable    Procedure: Device Interrogation Including analysis of device parameters  Current Settings: Ventricular Assist Device  Review of device function is stable    TXP LVAD INTERROGATIONS 10/5/2022 10/5/2022 10/5/2022 10/5/2022 10/4/2022 10/4/2022 10/4/2022   Type HeartMate3 HeartMate3 HeartMate3 HeartMate3 HeartMate3 HeartMate3 HeartMate3   Flow 5.4 5.5 5.4 5.4 5.4 5.4 5.6   Speed 6400 6400 6400 6400 6400 6400 6400   PI 2.0 1.5 2.5 2.8 1.8 1.5 1.8   Power ([place]) 5.1 5.5 5.5 5.5 5.5 5.5 5.5   LSL 6000 6000 6000 6000 6000 6000 6000   Low Flow Alarm - - - - - - -   High Power Alarm - - - - - - -   Pulsatility Intermittent pulse Pulse Pulse - - - Intermittent pulse

## 2022-10-05 NOTE — PROGRESS NOTES
10/05/2022  Shirley Porras    Current provider:  Roslyn Ragsdale DO    Device interrogation:  TXP LVAD INTERROGATIONS 10/5/2022 10/5/2022 10/4/2022 10/4/2022 10/4/2022 10/4/2022 10/4/2022   Type HeartMate3 HeartMate3 HeartMate3 HeartMate3 HeartMate3 HeartMate3 HeartMate3   Flow 5.4 5.4 5.4 5.4 5.6 5.5 5.5   Speed 6400 6400 6400 6400 6400 6400 6400   PI 2.5 2.8 1.8 1.5 1.8 1.8 2.1   Power ([place]) 5.5 5.5 5.5 5.5 5.5 5.5 5.5   LSL 6000 6000 6000 6000 6000 6000 6000   Low Flow Alarm - - - - - - -   High Power Alarm - - - - - - -   Pulsatility Pulse - - - Intermittent pulse Intermittent pulse Intermittent pulse          Rounded on Tim Richards to ensure all mechanical assist device settings (IABP or VAD) were appropriate and all parameters were within limits.  I was able to ensure all back up equipment was present, the staff had no issues, and the Perfusion Department daily rounding was complete.      For implantable VADs: Interrogation of Ventricular assist device was performed with analysis of device parameters and review of device function. I have personally reviewed the interrogation findings and agree with findings as stated.     In emergency, the nursing units have been notified to contact the perfusion department either by:  Calling [phone] from 630am to 4pm Mon thru Fri, utilizing the On-Call Finder functionality of Epic and searching for Perfusion, or by contacting the hospital  from 4pm to 630am and on weekends and asking to speak with the perfusionist on call.    11:28 AM

## 2022-10-05 NOTE — PROGRESS NOTES
DISCHARGE NOTE:    Tim Richards is a 56 y.o. male s/p recent HM2 ->Hm3 LVAD exchange admitted for bleeding from his R groin where previous ECMO cannula was placed.     Hospital Course: Warfarin was reversed. 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 Groin cx revealed ESBL ecoli and proteus, requiring ertapenem until 11/11. Trach was removed prior to discharge.     Pharmacy Interventions/Recommendations:  1) INR Goal: 2-3     2) Antiplatelet Agents: Stopped ASA 81mg/day this admit. Consider restarting in a few weeks.     3) Heparin Bridging:  Physician discretion given recent events    4) INR Follow-Up/Discharge Needs:  repeat INR Thursday 10/6 per physician attending request.     See list of discharge medication for dosing instructions.     Tim Richards and his caregiver verbalized their understanding and had the opportunity to ask questions.       Medication List        START taking these medications      acetaminophen 500 MG tablet  Commonly known as: TYLENOL  Take 2 tablets (1,000 mg total) by mouth 3 (three) times daily as needed for Pain.     sodium chloride 0.9% SolP 100 mL with ertapenem 1 gram SolR 1 g  Inject 1 g into the vein once daily.            CHANGE how you take these medications      warfarin 5 MG tablet  Commonly known as: COUMADIN  Take a half tablet (2.5mg) by mouth on 10/5 only. Then take 1 tablet (5mg) daily  What changed:   how much to take  how to take this  when to take this  additional instructions            CONTINUE taking these medications      ALPRAZolam 1 MG tablet  Commonly known as: XANAX  TAKE 1 TABLET BY MOUTH TWICE A DAILY AS NEEDED FOR ANXIETY.     amiodarone 200 MG Tab  Commonly known as: PACERONE  Take 2 tablets (400 mg total) by mouth once daily.     levothyroxine 50 MCG tablet  Commonly known as: SYNTHROID  Take 1 tablet (50 mcg total) by mouth before breakfast.     magnesium oxide 400 mg (241.3 mg  magnesium) tablet  Commonly known as: MAG-OX  Take 1 tablet (400 mg total) by mouth 2 (two) times daily.     mexiletine 250 MG Cap  Commonly known as: MEXITIL  Take 1 capsule (250 mg total) by mouth every 8 (eight) hours.     mirtazapine 30 MG tablet  Commonly known as: REMERON  Take 1 tablet (30 mg total) by mouth every evening.     omega-3 acid ethyl esters 1 gram capsule  Commonly known as: LOVAZA  Take 2 capsules (2 g total) by mouth 2 (two) times daily.     pantoprazole 40 MG tablet  Commonly known as: PROTONIX  Take 1 tablet (40 mg total) by mouth daily with lunch.     polyethylene glycol 17 gram Pwpk  Commonly known as: GLYCOLAX  Take 17 g by mouth once daily.            STOP taking these medications      aspirin 81 MG EC tablet  Commonly known as: ECOTRIN     doxycycline 100 MG Cap  Commonly known as: VIBRAMYCIN     torsemide 20 MG Tab  Commonly known as: DEMADEX     zolpidem 12.5 MG CR tablet  Commonly known as: AMBIEN CR               Where to Get Your Medications        These medications were sent to [facility]/pharmacy #[id number] - [place] - [address]  [address] [address]      Phone: [phone]   levothyroxine 50 MCG tablet  omega-3 acid ethyl esters 1 gram capsule       You can get these medications from any pharmacy    You don't need a prescription for these medications  acetaminophen 500 MG tablet       Information about where to get these medications is not yet available    Ask your nurse or doctor about these medications  sodium chloride 0.9% SolP 100 mL with ertapenem 1 gram SolR 1 g  warfarin 5 MG tablet

## 2022-10-05 NOTE — RESPIRATORY THERAPY
RAPID RESPONSE RESPIRATORY THERAPY PROACTIVE NOTE           Time of visit: 0934     Code Status: Full Code   : 1965  Bed: 315/315 A:   MRN: [id number]  Time spent at the bedside: < 15 min    SITUATION    Evaluated patient for: Previous tracheostomy, *removed 10/3/22    BACKGROUND    Why is the patient in the hospital?: Pseudoaneurysm of femoral artery    Patient has a past medical history of A-fib, Anticoagulant long-term use, Atrial flutter, CHF (congestive heart failure), Class 1 obesity due to excess calories with serious comorbidity and body mass index (BMI) of 31.0 to 31.9 in adult, Class 1 obesity due to excess calories with serious comorbidity and body mass index (BMI) of 32.0 to 32.9 in adult, Dilated cardiomyopathy, Disorder of kidney and ureter, Encounter for blood transfusion, Essential hypertension, Gout, HTN (hypertension), psychiatric care, ICD (implantable cardioverter-defibrillator) infection, Psychiatric problem, Thyroid disease, and Ventricular tachycardia (paroxysmal).    24 Hours Vitals Range:  Temp:  [97.5 °F (36.4 °C)-98.5 °F (36.9 °C)]   Pulse:  [68-77]   Resp:  [14-18]   BP: (68-98)/(0-66)   SpO2:  [94 %-100 %]     Labs:    Recent Labs     10/03/22  0513 10/04/22  0348 10/05/22  0510   * 135* 135*   K 3.7 3.8 3.9    103 104   CO2 25 26 24   CREATININE 1.1 1.0 1.1   GLU 64* 73 90   PHOS 2.7 2.9 2.6*   MG 2.1 2.1 2.0        No results for input(s): PH, PCO2, PO2, HCO3, POCSATURATED, BE in the last 72 hours.    ASSESSMENT/INTERVENTIONS  Pt resting comfortably in bed, no complaint or question for respiratory at time of visit. States he has had no increase in SOB or WOB since tracheostomy was removed. Emergency trach supplies remain at bedside: 8.0 & 6.0 Shiley Cuffed trachs.    Last VS   Temp: 98.1 °F (36.7 °C) (10/05 1200)  Pulse: 68 (10/05 1239)  Resp: 18 (10/05 1200)  BP: 86/64 (10/05 1205)  SpO2: 95 % (10/05 1219)    Level of Consciousness: Level of Consciousness (AVPU):  alert  Respiratory Effort: Respiratory Effort: Unlabored Expansion/Accessory Muscle Usage: Expansion/Accessory Muscles/Retractions: no use of accessory muscles, no retractions, expansion symmetric  All Lung Field Breath Sounds: All Lung Fields Breath Sounds: Anterior:, Posterior:, clear  SHERWIN Breath Sounds: clear  LLL Breath Sounds: diminished  RUL Breath Sounds: clear  RML Breath Sounds: diminished  RLL Breath Sounds: diminished  O2 Device/Concentration: room air  NIPPV: No Surgical airway: No  ETCO2 monitored:    Ambu at bedside: Ambu bag with the patient?: Yes, Adult Ambu    Active Orders   Respiratory Care    Pulse Oximetry Q4H     Frequency: Q4H     Number of Occurrences: Until Specified    Stoma Care by RT Q4H     Frequency: Q4H     Number of Occurrences: Until Specified       RECOMMENDATIONS    We recommend: RRT Recs: Continue POC per primary team.    FOLLOW-UP    Please call back the Rapid Response RT, Esvin Hall, RRT at x [phone] for any questions or concerns.

## 2022-10-05 NOTE — NURSING
Notified MD. Torres wound vac machine just delivered , but tech who delivered the machine said need doctor or wound care RN to hook up the machine.

## 2022-10-05 NOTE — NURSING
Discharge inventory done, only has 4 batteries in the room (see VAD in flowsheet), pt' spouse stated she took replacement battery [id number] to home already few days ago.

## 2022-10-05 NOTE — HOSPITAL COURSE
See H&P for full details of admission.  A CT Angiography was performed which revealed several pseudoaneurysms as well as fat stranding suggestive of infection.  Of note the patient was recently seen and prescribed antibiotics for purulent drainage of his groin decannulation site.  Cultures were obtained and vascular surgery was consulted for evaluation with a plan for femoral artery resection and iliofemoral bypass grafting.  His coumadin was reversed with FFP and vitamin K.  Infectious diseases as well as plastic surgery was consulted for coordination of care given likely vascular/bloodstream infection, the patient was started on cefepime and daptomycin empirically, no vancomycin due to previous contrast load.  On 9/27/22 the patient was taken to the OR and a dacron graft was placed, with plastics subsequently creating a muscle flap.  The procedure was uncomplicated and tolerated well by the patient.  Wound cultures from the site grew ESBL E coli, the patient was switched to meropenem based on susceptibilities.  He was set up for outpatient antibiotics to complete a 6 week course of ertapenem and home health nurse.  Upon the day of discharge he was stable, his INR was 2.1 and his portable wound vac was installed prior to discharge.

## 2022-10-06 ENCOUNTER — LAB VISIT (OUTPATIENT)
Dept: LAB | Facility: HOSPITAL | Age: 57
End: 2022-10-06
Attending: INTERNAL MEDICINE
Payer: COMMERCIAL

## 2022-10-06 ENCOUNTER — PATIENT OUTREACH (OUTPATIENT)
Dept: ADMINISTRATIVE | Facility: CLINIC | Age: 57
End: 2022-10-06
Payer: COMMERCIAL

## 2022-10-06 ENCOUNTER — ANTI-COAG VISIT (OUTPATIENT)
Dept: CARDIOLOGY | Facility: CLINIC | Age: 57
End: 2022-10-06
Payer: COMMERCIAL

## 2022-10-06 DIAGNOSIS — Z79.01 LONG TERM (CURRENT) USE OF ANTICOAGULANTS: Primary | ICD-10-CM

## 2022-10-06 DIAGNOSIS — I48.91 ATRIAL FIBRILLATION: ICD-10-CM

## 2022-10-06 DIAGNOSIS — I50.43 ACUTE ON CHRONIC COMBINED SYSTOLIC AND DIASTOLIC HEART FAILURE: ICD-10-CM

## 2022-10-06 LAB
INR PPP: 2.2 (ref 0.8–1.2)
PROTHROMBIN TIME: 22.8 SEC (ref 9–12.5)

## 2022-10-06 PROCEDURE — 85610 PROTHROMBIN TIME: CPT | Performed by: INTERNAL MEDICINE

## 2022-10-06 PROCEDURE — 93793 ANTICOAG MGMT PT WARFARIN: CPT | Mod: S$GLB,,,

## 2022-10-06 PROCEDURE — 93793 PR ANTICOAGULANT MGMT FOR PT TAKING WARFARIN: ICD-10-PCS | Mod: S$GLB,,,

## 2022-10-06 NOTE — NURSING
Surgeon, wife and pt. Present during discharge instructions. Pt. And wife st. Understanding of plan of care. Wife volunteering to take pt. To hospital garage via wheel chair so they don't have to wait on transportation. Assist with pt. Getting home clothes on, transfer to LVAD battery and tele removal.   PICC line to right arm with no signs/symptoms of infection or infiltration. No PIV present.   Pt. And wife deny any needs at this time and ready for discharge. DC packet with education given to wife.

## 2022-10-06 NOTE — TELEPHONE ENCOUNTER
LVM advising patient that he must add Dr. Booth to his provider preferences, we are unable to schedule.

## 2022-10-06 NOTE — DISCHARGE SUMMARY
[facility] - Cardiology Stepdown  Heart Transplant  Discharge Summary      Patient Name: Tim Richards  MRN: [id number]  Admission Date: 9/24/2022  Hospital Length of Stay: 11 days  Discharge Date and Time: 10/05/2022 7:09 PM  Attending Physician: Roslyn Ragsdale DO   Discharging Provider: Nic Torres MD  Primary Care Provider: Deyanira Booth MD     HPI: Tim Richards is a 56 y.o.  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  His post operative course was complicated by cardiogenic shock/RV failure requiring VA-ECMO.  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  He has a 8 Fr cuffed Shiley trach.  He had a prolonged and complicated hospital course from 6/24/22 - 9/4/22, was discharged to rehab (see clinic visit from 9/22/22 for additional details).  Of note he was last seen in clinic by Dr. Ragsdale on 9/22/22 after being discharged from rehab and at the time was noted to have purulence from the groin site and was placed on doxycycline PO.  He has had complaints of fatigue since his last hospitalization, per his wife.  This morning he was sitting and was crossing his legs for a prolonged period of time, and when he uncrossed them he noticed a pool of blood under him and active bleeding from his R groin where his previous ECMO cannula was.  He began to feel lightheaded when he tried walking.  Per wife he had 2 low flow alarms at home.      In the ED, patient stated he felt at his baseline and had no complaints.  No active bleeding at the time of my evaluation from R groin site.  Doppler BP was 80/0.  Hgb dropped from 9.4 -> 8.9 over the past 2 days.  INR was 3.0.  CT Angio performed per CV surgery recommendations.  HTS was consulted for admission, CT surgery was consulted in the ED for  evaluation of groin hematoma.       Cardiovascular Studies  TTE 8/30/22   There is an LVAD present. Base speed is 6300 RPMs. The pump type is a Heartmate III. The interventricular septum appears midline. The aortic valve does not open.   The left ventricle is severely enlarged with eccentric hypertrophy and severely decreased systolic function.   The estimated ejection fraction is 10%.   There is left ventricular global hypokinesis.   Left ventricular diastolic dysfunction.   There is trivial continuous aortic regurgitation.   There is abnormal septal wall motion.   The right ventricle is poorly seen, but appears enlarged with reduced systolic function.   Mild tricuspid regurgitation.      Procedure(s) (LRB):  CREATION, BYPASS, ARTERIAL, ILIAC TO FEMORAL WITH GRAFT (Right)  CREATION, FLAP, MUSCLE ROTATION, SARTORIUS AND RECTUS FEMORIS (Right)  APPLICATION, WOUND VAC (Right)     Hospital Course: See H&P for full details of admission.  A CT Angiography was performed which revealed several pseudoaneurysms as well as fat stranding suggestive of infection.  Of note the patient was recently seen and prescribed antibiotics for purulent drainage of his groin decannulation site.  Cultures were obtained and vascular surgery was consulted for evaluation with a plan for femoral artery resection and iliofemoral bypass grafting.  His coumadin was reversed with FFP and vitamin K.  Infectious diseases as well as plastic surgery was consulted for coordination of care given likely vascular/bloodstream infection, the patient was started on cefepime and daptomycin empirically, no vancomycin due to previous contrast load.  On 9/27/22 the patient was taken to the OR and a dacron graft was placed, with plastics subsequently creating a muscle flap.  The procedure was uncomplicated and tolerated well by the patient.  Wound cultures from the site grew ESBL E coli, the patient was switched to meropenem based on susceptibilities.  He  was set up for outpatient antibiotics to complete a 6 week course of ertapenem and home health nurse.  Upon the day of discharge he was stable, his INR was 2.1 and his portable wound vac was installed prior to discharge.       Goals of Care Treatment Preferences:  Code Status: Full Code    Health care agent: Wife is [email or web address].Marie Mendoza  Health care agent number: No value filed.                   Consults (From admission, onward)        Status Ordering Provider     Inpatient consult to Registered Dietitian/Nutritionist  Once        Provider:  (Not yet assigned)    Completed LINK DAVIS     Inpatient consult to General Surgery  Once        Provider:  (Not yet assigned)    Completed LINK DAVIS     [facility]  Once        Provider:  (Not yet assigned)    Completed LINK DAVIS     Inpatient consult to Registered Dietitian/Nutritionist  Once        Provider:  (Not yet assigned)    Completed LINK DAVIS     Inpatient consult to Plastic Surgery  Once        Provider:  Susana Connors MD    Completed NOMAN FLOWERS     Inpatient consult to Infectious Diseases  Once        Provider:  Jessica Perez MD    Completed ROSS GRANGER     Inpatient consult to Infectious Diseases  Once        Provider:  Jessica Perez MD    Completed JARON PONCE     Inpatient consult to Vascular Surgery  Once        Provider:  Noman Flowers MD    Completed MIKEL CLARK     Inpatient consult to Cardiology  Once        Provider:  Noah Jama MD    Completed MYRA WORTHINGTON.          Significant Diagnostic Studies: Labs:   BMP:   Recent Labs   Lab 10/04/22  0348 10/05/22  0510   GLU 73 90   * 135*   K 3.8 3.9    104   CO2 26 24   BUN 11 10   CREATININE 1.0 1.1   CALCIUM 8.5* 8.1*   MG 2.1 2.0   , CMP   Recent Labs   Lab 10/04/22  0348 10/05/22  0510   * 135*   K 3.8 3.9    104   CO2 26 24   GLU 73 90   BUN 11 10   CREATININE 1.0 1.1   CALCIUM 8.5* 8.1*   PROT  5.5* 5.3*   ALBUMIN 1.9* 1.9*   BILITOT 0.4 0.3   ALKPHOS 81 79   AST 22 17   ALT 16 12   ANIONGAP 6* 7*   , CBC   Recent Labs   Lab 10/04/22  0348 10/04/22  0942 10/05/22  0510   WBC 7.33 6.76 5.87   HGB 7.6* 7.8* 7.5*   HCT 25.7* 25.8* 25.5*    290 320    and INR   Lab Results   Component Value Date    INR 2.1 (H) 10/05/2022    INR 1.7 (H) 10/04/2022    INR 1.5 (H) 10/03/2022       Pending Diagnostic Studies:     None        Final Active Diagnoses:    Diagnosis Date Noted POA    PRINCIPAL PROBLEM:  Pseudoaneurysm of femoral artery [I72.4] 09/24/2022 Yes    Tracheostomy present [Z93.0] 10/03/2022 Not Applicable    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  Medications:  Reconciled Home Medications:      Medication List      START taking these medications    acetaminophen 500 MG tablet  Commonly known as: TYLENOL  Take 2 tablets (1,000 mg total) by mouth 3 (three) times daily as needed for Pain.     sodium chloride 0.9% SolP 100 mL with ertapenem 1 gram SolR 1 g  Inject 1 g into the vein once daily.        CHANGE how you take these medications    warfarin 5 MG tablet  Commonly known as: COUMADIN  Take a half tablet (2.5mg) by mouth on 10/5 only. Then take 1 tablet (5mg) daily  What changed:   · how much to take  · how to take this  · when to take this  · additional instructions        CONTINUE taking these medications    ALPRAZolam 1 MG tablet  Commonly known as:  XANAX  TAKE 1 TABLET BY MOUTH TWICE A DAILY AS NEEDED FOR ANXIETY.     amiodarone 200 MG Tab  Commonly known as: PACERONE  Take 2 tablets (400 mg total) by mouth once daily.     levothyroxine 50 MCG tablet  Commonly known as: SYNTHROID  Take 1 tablet (50 mcg total) by mouth before breakfast.     magnesium oxide 400 mg (241.3 mg magnesium) tablet  Commonly known as: MAG-OX  Take 1 tablet (400 mg total) by mouth 2 (two) times daily.     mexiletine 250 MG Cap  Commonly known as: MEXITIL  Take 1 capsule (250 mg total) by mouth every 8 (eight) hours.     mirtazapine 30 MG tablet  Commonly known as: REMERON  Take 1 tablet (30 mg total) by mouth every evening.     omega-3 acid ethyl esters 1 gram capsule  Commonly known as: LOVAZA  Take 2 capsules (2 g total) by mouth 2 (two) times daily.     pantoprazole 40 MG tablet  Commonly known as: PROTONIX  Take 1 tablet (40 mg total) by mouth daily with lunch.     polyethylene glycol 17 gram Pwpk  Commonly known as: GLYCOLAX  Take 17 g by mouth once daily.        STOP taking these medications    aspirin 81 MG EC tablet  Commonly known as: ECOTRIN     doxycycline 100 MG Cap  Commonly known as: VIBRAMYCIN     torsemide 20 MG Tab  Commonly known as: DEMADEX     zolpidem 12.5 MG CR tablet  Commonly known as: AMBIEN CR            Nic Torres MD  Heart Transplant  [facility] - [facility]

## 2022-10-06 NOTE — PROGRESS NOTES
C3 nurse attempted to contact Timmariama Richards  for a TCC post hospital discharge follow up call. No answer. Left voicemail with callback information. The patient does not have a scheduled HOSFU appointment. Message sent to PCP staff for assistance with scheduling visit with patient.

## 2022-10-07 NOTE — PROGRESS NOTES
C3 Nurse made second attempt to reach patient for TCC call. Left voicemail asking patient to please call [phone] and leave first name, last name, and , and María will return call.

## 2022-10-10 ENCOUNTER — PATIENT MESSAGE (OUTPATIENT)
Dept: TRANSPLANT | Facility: CLINIC | Age: 57
End: 2022-10-10
Payer: COMMERCIAL

## 2022-10-10 ENCOUNTER — TELEPHONE (OUTPATIENT)
Dept: TRANSPLANT | Facility: CLINIC | Age: 57
End: 2022-10-10
Payer: COMMERCIAL

## 2022-10-10 ENCOUNTER — ANTI-COAG VISIT (OUTPATIENT)
Dept: CARDIOLOGY | Facility: CLINIC | Age: 57
End: 2022-10-10
Payer: COMMERCIAL

## 2022-10-10 ENCOUNTER — PATIENT MESSAGE (OUTPATIENT)
Dept: VASCULAR SURGERY | Facility: CLINIC | Age: 57
End: 2022-10-10
Payer: COMMERCIAL

## 2022-10-10 ENCOUNTER — LAB VISIT (OUTPATIENT)
Dept: LAB | Facility: HOSPITAL | Age: 57
End: 2022-10-10
Attending: INTERNAL MEDICINE
Payer: COMMERCIAL

## 2022-10-10 DIAGNOSIS — Z79.01 LONG TERM (CURRENT) USE OF ANTICOAGULANTS: ICD-10-CM

## 2022-10-10 DIAGNOSIS — I50.43 ACUTE ON CHRONIC COMBINED SYSTOLIC AND DIASTOLIC HEART FAILURE: ICD-10-CM

## 2022-10-10 DIAGNOSIS — Z95.811 HEART REPLACED BY HEART ASSIST DEVICE: Primary | ICD-10-CM

## 2022-10-10 DIAGNOSIS — I13.0 HYPERTENSIVE HEART AND RENAL DISEASE WITH CONGESTIVE HEART FAILURE: Primary | ICD-10-CM

## 2022-10-10 DIAGNOSIS — N18.32 CKD STAGE G3B/A1, GFR 30-44 AND ALBUMIN CREATININE RATIO <30 MG/G: ICD-10-CM

## 2022-10-10 LAB
ALBUMIN SERPL BCP-MCNC: 2.1 G/DL (ref 3.5–5.2)
ALP SERPL-CCNC: 69 U/L (ref 55–135)
ALT SERPL W/O P-5'-P-CCNC: 12 U/L (ref 10–44)
ANION GAP SERPL CALC-SCNC: 4 MMOL/L (ref 8–16)
AST SERPL-CCNC: 24 U/L (ref 10–40)
BASOPHILS # BLD AUTO: 0.02 K/UL (ref 0–0.2)
BASOPHILS NFR BLD: 0.3 % (ref 0–1.9)
BILIRUB SERPL-MCNC: 0.4 MG/DL (ref 0.1–1)
BUN SERPL-MCNC: 9 MG/DL (ref 6–20)
CALCIUM SERPL-MCNC: 7.9 MG/DL (ref 8.7–10.5)
CHLORIDE SERPL-SCNC: 109 MMOL/L (ref 95–110)
CO2 SERPL-SCNC: 27 MMOL/L (ref 23–29)
CREAT SERPL-MCNC: 1.2 MG/DL (ref 0.5–1.4)
DIFFERENTIAL METHOD: ABNORMAL
EOSINOPHIL # BLD AUTO: 0.3 K/UL (ref 0–0.5)
EOSINOPHIL NFR BLD: 4.2 % (ref 0–8)
ERYTHROCYTE [DISTWIDTH] IN BLOOD BY AUTOMATED COUNT: 16.3 % (ref 11.5–14.5)
EST. GFR  (NO RACE VARIABLE): >60 ML/MIN/1.73 M^2
GLUCOSE SERPL-MCNC: 57 MG/DL (ref 70–110)
HCT VFR BLD AUTO: 24.8 % (ref 40–54)
HGB BLD-MCNC: 7.5 G/DL (ref 14–18)
IMM GRANULOCYTES # BLD AUTO: 0.06 K/UL (ref 0–0.04)
IMM GRANULOCYTES NFR BLD AUTO: 0.8 % (ref 0–0.5)
INR PPP: 2 (ref 0.8–1.2)
LYMPHOCYTES # BLD AUTO: 1.3 K/UL (ref 1–4.8)
LYMPHOCYTES NFR BLD: 17 % (ref 18–48)
MCH RBC QN AUTO: 27.5 PG (ref 27–31)
MCHC RBC AUTO-ENTMCNC: 30.2 G/DL (ref 32–36)
MCV RBC AUTO: 91 FL (ref 82–98)
MONOCYTES # BLD AUTO: 0.7 K/UL (ref 0.3–1)
MONOCYTES NFR BLD: 8.9 % (ref 4–15)
NEUTROPHILS # BLD AUTO: 5.3 K/UL (ref 1.8–7.7)
NEUTROPHILS NFR BLD: 68.8 % (ref 38–73)
NRBC BLD-RTO: 0 /100 WBC
PLATELET # BLD AUTO: 389 K/UL (ref 150–450)
PMV BLD AUTO: 9.7 FL (ref 9.2–12.9)
POTASSIUM SERPL-SCNC: 5.1 MMOL/L (ref 3.5–5.1)
PROT SERPL-MCNC: 5.3 G/DL (ref 6–8.4)
PROTHROMBIN TIME: 20.9 SEC (ref 9–12.5)
RBC # BLD AUTO: 2.73 M/UL (ref 4.6–6.2)
SODIUM SERPL-SCNC: 140 MMOL/L (ref 136–145)
WBC # BLD AUTO: 7.66 K/UL (ref 3.9–12.7)

## 2022-10-10 PROCEDURE — 80053 COMPREHEN METABOLIC PANEL: CPT | Performed by: INTERNAL MEDICINE

## 2022-10-10 PROCEDURE — 93793 PR ANTICOAGULANT MGMT FOR PT TAKING WARFARIN: ICD-10-PCS | Mod: S$GLB,,,

## 2022-10-10 PROCEDURE — 85610 PROTHROMBIN TIME: CPT | Performed by: INTERNAL MEDICINE

## 2022-10-10 PROCEDURE — 85025 COMPLETE CBC W/AUTO DIFF WBC: CPT | Performed by: INTERNAL MEDICINE

## 2022-10-10 PROCEDURE — 93793 ANTICOAG MGMT PT WARFARIN: CPT | Mod: S$GLB,,,

## 2022-10-10 NOTE — TELEPHONE ENCOUNTER
Called patient's spouse in regards to his K being 5.1, she stated that he has been taking 20 meq of K since leaving the hospital. Patient is not supposed to be on K so asked her to discontinue and will recheck will labs next week. Wife also asked about site near groin. She is going to send more photos of site Jina Freeman has been working on this since this morning and will assist with this issue. Wife verbalized understanding and agreement.

## 2022-10-11 ENCOUNTER — PATIENT MESSAGE (OUTPATIENT)
Dept: TRANSPLANT | Facility: CLINIC | Age: 57
End: 2022-10-11
Payer: COMMERCIAL

## 2022-10-11 RX ORDER — FERROUS GLUCONATE 324(38)MG
324 TABLET ORAL
COMMUNITY
End: 2022-10-11 | Stop reason: SDUPTHER

## 2022-10-11 RX ORDER — FERROUS GLUCONATE 324(38)MG
324 TABLET ORAL
Qty: 30 TABLET | Refills: 11 | Status: SHIPPED | OUTPATIENT
Start: 2022-10-11 | End: 2022-11-23

## 2022-10-11 RX ORDER — ALPRAZOLAM 1 MG/1
.5-1 TABLET ORAL 2 TIMES DAILY PRN
Qty: 30 TABLET | Refills: 0 | Status: SHIPPED | OUTPATIENT
Start: 2022-10-11 | End: 2022-10-13 | Stop reason: SDUPTHER

## 2022-10-11 NOTE — PROGRESS NOTES
10/5/22 per Pearl Mendez, Pharmacist sent staff message that Dr Rhodes approved patient to use INR meter.  Chart now being worked on as I returned to work 10/10/22.  Patient currently on home health services.    I explained to patient and Kelly/wife the difference in insurance coverage of cost for INR meter to home monitoring company versus lab appointments and/or home health collecting INRs which they verbalized understanding.  Patient declined being processed for meter at this time related to 12/2022 he will be changing from BC[employer] insurance to Medicare insurance and was instructed to contact [facility] if they changes their decision about INR meter which they verbalized understanding.   I instructed patient and Kelly wife to notify [facility] if patient gets discharged from home health services which they verbalized understanding.    11/11/22  I spoke to Kelly/wife regarding patient now wanting INR meter but the issue is on 12/1/22 he will  changing from BCBS to Medicare insurance so can't do anything now to process because with insurance change will have to re-submit any documents/work to process for meter.  I instructed her once Medicare insurance is obtained that she must update that insurance information to [facility] then contact the [facility] so at that time we can process him for meter which she verbalized understanding.

## 2022-10-13 ENCOUNTER — OFFICE VISIT (OUTPATIENT)
Dept: PSYCHIATRY | Facility: CLINIC | Age: 57
End: 2022-10-13
Payer: COMMERCIAL

## 2022-10-13 DIAGNOSIS — I50.42 CHRONIC COMBINED SYSTOLIC AND DIASTOLIC HEART FAILURE: Chronic | ICD-10-CM

## 2022-10-13 DIAGNOSIS — F41.1 GENERALIZED ANXIETY DISORDER: Primary | ICD-10-CM

## 2022-10-13 DIAGNOSIS — F43.23 ADJUSTMENT DISORDER WITH MIXED ANXIETY AND DEPRESSED MOOD: ICD-10-CM

## 2022-10-13 DIAGNOSIS — G47.00 INSOMNIA, UNSPECIFIED TYPE: ICD-10-CM

## 2022-10-13 PROCEDURE — 1159F MED LIST DOCD IN RCRD: CPT | Mod: CPTII,95,, | Performed by: PSYCHIATRY & NEUROLOGY

## 2022-10-13 PROCEDURE — 99213 PR OFFICE/OUTPT VISIT, EST, LEVL III, 20-29 MIN: ICD-10-PCS | Mod: 95,,, | Performed by: PSYCHIATRY & NEUROLOGY

## 2022-10-13 PROCEDURE — 1159F PR MEDICATION LIST DOCUMENTED IN MEDICAL RECORD: ICD-10-PCS | Mod: CPTII,95,, | Performed by: PSYCHIATRY & NEUROLOGY

## 2022-10-13 PROCEDURE — 99999 PR PBB SHADOW E&M-EST. PATIENT-LVL II: CPT | Mod: PBBFAC,,, | Performed by: PSYCHIATRY & NEUROLOGY

## 2022-10-13 PROCEDURE — 99999 PR PBB SHADOW E&M-EST. PATIENT-LVL II: ICD-10-PCS | Mod: PBBFAC,,, | Performed by: PSYCHIATRY & NEUROLOGY

## 2022-10-13 PROCEDURE — 1160F RVW MEDS BY RX/DR IN RCRD: CPT | Mod: CPTII,95,, | Performed by: PSYCHIATRY & NEUROLOGY

## 2022-10-13 PROCEDURE — 1111F DSCHRG MED/CURRENT MED MERGE: CPT | Mod: CPTII,95,, | Performed by: PSYCHIATRY & NEUROLOGY

## 2022-10-13 PROCEDURE — 1160F PR REVIEW ALL MEDS BY PRESCRIBER/CLIN PHARMACIST DOCUMENTED: ICD-10-PCS | Mod: CPTII,95,, | Performed by: PSYCHIATRY & NEUROLOGY

## 2022-10-13 PROCEDURE — 99213 OFFICE O/P EST LOW 20 MIN: CPT | Mod: 95,,, | Performed by: PSYCHIATRY & NEUROLOGY

## 2022-10-13 PROCEDURE — 1111F PR DISCHARGE MEDS RECONCILED W/ CURRENT OUTPATIENT MED LIST: ICD-10-PCS | Mod: CPTII,95,, | Performed by: PSYCHIATRY & NEUROLOGY

## 2022-10-13 RX ORDER — MIRTAZAPINE 30 MG/1
30 TABLET, FILM COATED ORAL NIGHTLY
Qty: 90 TABLET | Refills: 1 | Status: SHIPPED | OUTPATIENT
Start: 2022-10-13 | End: 2022-10-28 | Stop reason: SDUPTHER

## 2022-10-13 RX ORDER — ALPRAZOLAM 1 MG/1
.5-1 TABLET ORAL 2 TIMES DAILY PRN
Qty: 30 TABLET | Refills: 5 | Status: SHIPPED | OUTPATIENT
Start: 2022-10-13 | End: 2023-03-17 | Stop reason: SDUPTHER

## 2022-10-13 NOTE — PROGRESS NOTES
Outpatient Psychiatry Follow-Up Visit (MD/NP)    10/13/2022    The patient location is: home; [place], [place]  The chief complaint leading to consultation is: depression and anxiety    Visit type: audiovisual    Face to Face time with patient: 20 minutes  30 minutes of total time spent on the encounter, which includes face to face time and non-face to face time preparing to see the patient (eg, review of tests), Obtaining and/or reviewing separately obtained history, Documenting clinical information in the electronic or other health record, Independently interpreting results (not separately reported) and communicating results to the patient/family/caregiver, or Care coordination (not separately reported).         Each patient to whom he or she provides medical services by telemedicine is:  (1) informed of the relationship between the physician and patient and the respective role of any other health care provider with respect to management of the patient; and (2) notified that he or she may decline to receive medical services by telemedicine and may withdraw from such care at any time.      Clinical Status of Patient:  Outpatient (Ambulatory)    Chief Complaint:  Tim Richards is a 56 y.o. male who presents today for follow-up of anxiety.  Met with patient.      Interval History and Content of Current Session:  Interim Events/Subjective Report/Content of Current Session:  Patient Tim Richards presents to clinic for follow-up.  Since last I saw him, he was admitted into the hospital for replacement of his LVAD.  He also recovered from COVID at that time.  Back home after completing inpatient rehab to regain some strength.  Now relearning how to walk again.  Has a lot of muscle wasting from being laid up for a while.  Since home, sleep is good.  No panic attacks.  Likes not having to be bothered as much.  Says that he does get easily set off by wife but realizes that she is just worried about him.  Feels a  little depressed about how much function he has lost.  Says that he was getting mirtazapine in the hospital but does not recall getting it upon returning home.  Also was taken off of buspirone in the hospital because of coumadin levels.    Psychotherapy:  Target symptoms: anxiety   Why chosen therapy is appropriate versus another modality: relevant to diagnosis  Outcome monitoring methods: self-report, observation  Therapeutic intervention type: supportive psychotherapy  Topics discussed/themes: stress related to medical comorbidities, building skills sets for symptom management, symptom recognition  The patient's response to the intervention is accepting. The patient's progress toward treatment goals is fair.   Duration of intervention: 15 minutes.    Review of Systems   PSYCHIATRIC: Pertinant items are noted in the narrative.  CONSTITUTIONAL: Positive for weight loss.   MUSCULOSKELETAL: Positive for pain.  NEUROLOGIC: Positive for limb weakness and poor balance.  RESPIRATORY: No shortness of breath.  CARDIOVASCULAR: No tachycardia or chest pain.  GASTROINTESTINAL: No nausea, vomiting, pain, constipation or diarrhea.    Past Medical, Family and Social History: The patient's past medical, family and social history have been reviewed and updated as appropriate within the electronic medical record - see encounter notes.    Compliance: yes    Side effects: None    Risk Parameters:  Patient reports no suicidal ideation  Patient reports no homicidal ideation  Patient reports no self-injurious behavior  Patient reports no violent behavior    Exam (detailed: at least 9 elements; comprehensive: all 15 elements)   Constitutional  Vitals:  Most recent vital signs, dated less than 90 days prior to this appointment, were reviewed.   There were no vitals filed for this visit.     General:  unremarkable, age appropriate     Musculoskeletal  Muscle Strength/Tone:  no tremor, no tic   Gait & Station:  Not observed; video visit      Psychiatric  Speech:  no latency; no press   Mood & Affect:  steady  blunted   Thought Process:  normal and logical   Associations:  intact   Thought Content:  normal, no suicidality, no homicidality, delusions, or paranoia   Insight:  has awareness of illness   Judgement: limited   Orientation:  person, place, situation, time/date   Memory: intact for content of interview   Language: able to name, able to repeat   Attention Span & Concentration:  able to focus   Fund of Knowledge:  intact and appropriate to age and level of education     Assessment and Diagnosis   Status/Progress: Based on the examination today, the patient's problem(s) is/are adequately but not ideally controlled.  New problems have been presented today.   Co-morbidities are complicating management of the primary condition.  There are no active rule-out diagnoses for this patient at this time.     General Impression: We will continue pharmacological intervention and adjunctive therapy.       ICD-10-CM ICD-9-CM   1. Generalized anxiety disorder  F41.1 300.02   2. Adjustment disorder with mixed anxiety and depressed mood  F43.23 309.28   3. Insomnia, unspecified type  G47.00 780.52   4. Chronic combined systolic and diastolic heart failure  I50.42 428.42       Intervention/Counseling/Treatment Plan   Medication Management: Continue current medications. The risks and benefits of medication were discussed with the patient.  Counseling provided with patient as follows: importance of compliance with chosen treatment options was emphasized, risks and benefits of treatment options, including medications, were discussed with the patient, risk factor reduction, prognosis, patient education, instructions for  management, treatment and follow-up were reviewed  1. Okay to continue short course of Xanax 0.5-1 mg daily p.r.n. anxiety.  Warned of risk of oversedation, falls, and not to drink or drive until the effects of the medication are known.  Warned of  risk of addictive and withdrawal potential.  Warned patient to keep medications in pill bottle and not to carry loosely.  Long-term goal is to not continue this medication.  2. Continue mirtazapine 30 mg nightly targeting sleep, depression, anxiety.  Warned of risk of miguel, suicidality, serotonin syndrome, oversedation, weight gain.  3. [employer] website reviewed with no suspicious activity.    4. Education and counseling about recognizing anxiety and how to better handle stressors.      Return to Clinic: 6 months, as needed

## 2022-10-13 NOTE — PATIENT INSTRUCTIONS
"        You have been provided with a certain amount of medication with a specified number of refills.  Please follow up within an adequate time before you run out of medications.    REFILLS FOR CONTROLLED SUBSTANCES WILL NOT BE GIVEN WITHOUT AN APPOINTMENT.  I will not honor or fill automated refill requests from pharmacies.  You must come in for an appointment to get refills.    Please book an appointment to have any paperwork (FMLA, other special accommodations, etc.) completed.  If paperwork is requested to be done without an appointment, it may take a while (days or weeks), depending on the complexity of the paperwork.    Please utilize the [facility] Information [facility] department at your local [facility] to obtain any medical records.  Copies cannot be printed and provided within our clinic.      Please book your next appointment for myself or therapist by phone by calling our office at [phone].        Note that follow up appointments are 10-20 minutes long.  It is important that we focus on medication management.  Should you need therapy, please get set up with our therapist or call your insurance company to find out which therapists are available in your area.      PLEASE BE AT LEAST 15 MINUTES EARLY FOR YOUR NEXT APPOINTMENT.  Late arrivals WILL BE TURNED AWAY AND ASKED TO RESCHEDULE.  YOU MUST COME EARLY TO ALLOW TIME FOR CHECK-IN AS WELL AS GET YOUR VITAL SIGNS AND GO OVER YOUR MEDICATIONS.  Tardiness is not fair to the patients who present after you and would be on time for their appointments.  It causes a delay in the appointments for patients and staff.  YOU MAY ALSO BE DISCHARGED FROM CLINIC with multiple late arrivals, late cancellations, or "No Show" appointments.         -----------------------------------------------------------------------------------------------------------------  IF YOU FEEL SUICIDAL OR HAVING THOUGHTS OR PLANS TO HURT YOURSELF OR OTHERS, CALL 911 OR REPORT " TO THE NEAREST EMERGENCY ROOM.  YOU CAN ALSO ACCESS THE FOLLOWING HOTLINE:    National Suicide Prevention Hotline Number 1-455-231-TALK (9346)

## 2022-10-17 ENCOUNTER — LAB VISIT (OUTPATIENT)
Dept: LAB | Facility: HOSPITAL | Age: 57
End: 2022-10-17
Attending: INTERNAL MEDICINE
Payer: COMMERCIAL

## 2022-10-17 ENCOUNTER — ANTI-COAG VISIT (OUTPATIENT)
Dept: CARDIOLOGY | Facility: CLINIC | Age: 57
End: 2022-10-17
Payer: COMMERCIAL

## 2022-10-17 DIAGNOSIS — I13.0 HYPERTENSIVE HEART AND RENAL DISEASE WITH CONGESTIVE HEART FAILURE: Primary | ICD-10-CM

## 2022-10-17 DIAGNOSIS — I50.43 ACUTE ON CHRONIC COMBINED SYSTOLIC AND DIASTOLIC HEART FAILURE: ICD-10-CM

## 2022-10-17 DIAGNOSIS — Z79.01 LONG TERM (CURRENT) USE OF ANTICOAGULANTS: ICD-10-CM

## 2022-10-17 DIAGNOSIS — N18.32 CHRONIC KIDNEY DISEASE (CKD) STAGE G3B/A1, MODERATELY DECREASED GLOMERULAR FILTRATION RATE (GFR) BETWEEN 30-44 ML/MIN/1.73 SQUARE METER AND ALBUMINURIA CREATININE RATIO LESS THAN 30 MG/G: ICD-10-CM

## 2022-10-17 LAB
ALBUMIN SERPL BCP-MCNC: 2.1 G/DL (ref 3.5–5.2)
ALP SERPL-CCNC: 61 U/L (ref 55–135)
ALT SERPL W/O P-5'-P-CCNC: 12 U/L (ref 10–44)
ANION GAP SERPL CALC-SCNC: 7 MMOL/L (ref 8–16)
AST SERPL-CCNC: 28 U/L (ref 10–40)
BASOPHILS # BLD AUTO: 0.01 K/UL (ref 0–0.2)
BASOPHILS NFR BLD: 0.2 % (ref 0–1.9)
BILIRUB SERPL-MCNC: 0.3 MG/DL (ref 0.1–1)
BUN SERPL-MCNC: 12 MG/DL (ref 6–20)
CALCIUM SERPL-MCNC: 8.3 MG/DL (ref 8.7–10.5)
CHLORIDE SERPL-SCNC: 109 MMOL/L (ref 95–110)
CO2 SERPL-SCNC: 25 MMOL/L (ref 23–29)
CREAT SERPL-MCNC: 1.1 MG/DL (ref 0.5–1.4)
DIFFERENTIAL METHOD: ABNORMAL
EOSINOPHIL # BLD AUTO: 0.3 K/UL (ref 0–0.5)
EOSINOPHIL NFR BLD: 5 % (ref 0–8)
ERYTHROCYTE [DISTWIDTH] IN BLOOD BY AUTOMATED COUNT: 16.6 % (ref 11.5–14.5)
EST. GFR  (NO RACE VARIABLE): >60 ML/MIN/1.73 M^2
GLUCOSE SERPL-MCNC: 62 MG/DL (ref 70–110)
HCT VFR BLD AUTO: 26.8 % (ref 40–54)
HGB BLD-MCNC: 7.7 G/DL (ref 14–18)
IMM GRANULOCYTES # BLD AUTO: 0.02 K/UL (ref 0–0.04)
IMM GRANULOCYTES NFR BLD AUTO: 0.4 % (ref 0–0.5)
INR PPP: 2.6 (ref 0.8–1.2)
LYMPHOCYTES # BLD AUTO: 1.2 K/UL (ref 1–4.8)
LYMPHOCYTES NFR BLD: 22 % (ref 18–48)
MCH RBC QN AUTO: 26.4 PG (ref 27–31)
MCHC RBC AUTO-ENTMCNC: 28.7 G/DL (ref 32–36)
MCV RBC AUTO: 92 FL (ref 82–98)
MONOCYTES # BLD AUTO: 0.7 K/UL (ref 0.3–1)
MONOCYTES NFR BLD: 12.4 % (ref 4–15)
NEUTROPHILS # BLD AUTO: 3.3 K/UL (ref 1.8–7.7)
NEUTROPHILS NFR BLD: 60 % (ref 38–73)
NRBC BLD-RTO: 0 /100 WBC
PLATELET # BLD AUTO: 333 K/UL (ref 150–450)
PMV BLD AUTO: 9.9 FL (ref 9.2–12.9)
POTASSIUM SERPL-SCNC: 4.5 MMOL/L (ref 3.5–5.1)
PROT SERPL-MCNC: 5.9 G/DL (ref 6–8.4)
PROTHROMBIN TIME: 27 SEC (ref 9–12.5)
RBC # BLD AUTO: 2.92 M/UL (ref 4.6–6.2)
SODIUM SERPL-SCNC: 141 MMOL/L (ref 136–145)
WBC # BLD AUTO: 5.55 K/UL (ref 3.9–12.7)

## 2022-10-17 PROCEDURE — 93793 PR ANTICOAGULANT MGMT FOR PT TAKING WARFARIN: ICD-10-PCS | Mod: S$GLB,,,

## 2022-10-17 PROCEDURE — 80053 COMPREHEN METABOLIC PANEL: CPT | Performed by: INTERNAL MEDICINE

## 2022-10-17 PROCEDURE — 85610 PROTHROMBIN TIME: CPT | Performed by: INTERNAL MEDICINE

## 2022-10-17 PROCEDURE — 85025 COMPLETE CBC W/AUTO DIFF WBC: CPT | Performed by: INTERNAL MEDICINE

## 2022-10-17 PROCEDURE — 93793 ANTICOAG MGMT PT WARFARIN: CPT | Mod: S$GLB,,,

## 2022-10-18 ENCOUNTER — DOCUMENTATION ONLY (OUTPATIENT)
Dept: TRANSPLANT | Facility: CLINIC | Age: 57
End: 2022-10-18
Payer: COMMERCIAL

## 2022-10-18 ENCOUNTER — DOCUMENT SCAN (OUTPATIENT)
Dept: HOME HEALTH SERVICES | Facility: HOSPITAL | Age: 57
End: 2022-10-18
Payer: COMMERCIAL

## 2022-10-18 NOTE — PROGRESS NOTES
Disability form received and faxed to disability department, scanned into patient chart and given to VAD coordinator.

## 2022-10-19 ENCOUNTER — DOCUMENT SCAN (OUTPATIENT)
Dept: HOME HEALTH SERVICES | Facility: HOSPITAL | Age: 57
End: 2022-10-19
Payer: COMMERCIAL

## 2022-10-19 NOTE — ADDENDUM NOTE
Addendum  created 10/19/22 1536 by Kerri Drake MD    Attestation recorded in Intraprocedure, Intraprocedure Attestations filed

## 2022-10-20 ENCOUNTER — DOCUMENT SCAN (OUTPATIENT)
Dept: HOME HEALTH SERVICES | Facility: HOSPITAL | Age: 57
End: 2022-10-20
Payer: COMMERCIAL

## 2022-10-21 ENCOUNTER — DOCUMENT SCAN (OUTPATIENT)
Dept: HOME HEALTH SERVICES | Facility: HOSPITAL | Age: 57
End: 2022-10-21
Payer: COMMERCIAL

## 2022-10-24 ENCOUNTER — TELEPHONE (OUTPATIENT)
Dept: TRANSPLANT | Facility: CLINIC | Age: 57
End: 2022-10-24

## 2022-10-24 ENCOUNTER — ANTI-COAG VISIT (OUTPATIENT)
Dept: CARDIOLOGY | Facility: CLINIC | Age: 57
End: 2022-10-24
Payer: COMMERCIAL

## 2022-10-24 ENCOUNTER — DOCUMENT SCAN (OUTPATIENT)
Dept: HOME HEALTH SERVICES | Facility: HOSPITAL | Age: 57
End: 2022-10-24
Payer: COMMERCIAL

## 2022-10-24 ENCOUNTER — LAB VISIT (OUTPATIENT)
Dept: LAB | Facility: HOSPITAL | Age: 57
End: 2022-10-24
Attending: INTERNAL MEDICINE
Payer: COMMERCIAL

## 2022-10-24 DIAGNOSIS — I13.0 HYPERTENSIVE HEART AND RENAL DISEASE WITH CONGESTIVE HEART FAILURE: Primary | ICD-10-CM

## 2022-10-24 DIAGNOSIS — I50.43 ACUTE ON CHRONIC COMBINED SYSTOLIC AND DIASTOLIC HEART FAILURE: ICD-10-CM

## 2022-10-24 DIAGNOSIS — N18.32 CHRONIC KIDNEY DISEASE (CKD) STAGE G3B/A1, MODERATELY DECREASED GLOMERULAR FILTRATION RATE (GFR) BETWEEN 30-44 ML/MIN/1.73 SQUARE METER AND ALBUMINURIA CREATININE RATIO LESS THAN 30 MG/G: ICD-10-CM

## 2022-10-24 DIAGNOSIS — Z79.01 LONG TERM (CURRENT) USE OF ANTICOAGULANTS: ICD-10-CM

## 2022-10-24 LAB
ALBUMIN SERPL BCP-MCNC: 2.2 G/DL (ref 3.5–5.2)
ALP SERPL-CCNC: 66 U/L (ref 55–135)
ALT SERPL W/O P-5'-P-CCNC: 11 U/L (ref 10–44)
ANION GAP SERPL CALC-SCNC: 5 MMOL/L (ref 8–16)
AST SERPL-CCNC: 22 U/L (ref 10–40)
BASOPHILS # BLD AUTO: 0.01 K/UL (ref 0–0.2)
BASOPHILS NFR BLD: 0.2 % (ref 0–1.9)
BILIRUB SERPL-MCNC: 0.3 MG/DL (ref 0.1–1)
BUN SERPL-MCNC: 12 MG/DL (ref 6–20)
CALCIUM SERPL-MCNC: 8.1 MG/DL (ref 8.7–10.5)
CHLORIDE SERPL-SCNC: 109 MMOL/L (ref 95–110)
CO2 SERPL-SCNC: 27 MMOL/L (ref 23–29)
CREAT SERPL-MCNC: 1.1 MG/DL (ref 0.5–1.4)
DIFFERENTIAL METHOD: ABNORMAL
EOSINOPHIL # BLD AUTO: 0.3 K/UL (ref 0–0.5)
EOSINOPHIL NFR BLD: 5.8 % (ref 0–8)
ERYTHROCYTE [DISTWIDTH] IN BLOOD BY AUTOMATED COUNT: 16.7 % (ref 11.5–14.5)
EST. GFR  (NO RACE VARIABLE): >60 ML/MIN/1.73 M^2
FUNGUS SPEC CULT: NORMAL
GLUCOSE SERPL-MCNC: 59 MG/DL (ref 70–110)
HCT VFR BLD AUTO: 29.2 % (ref 40–54)
HGB BLD-MCNC: 8.5 G/DL (ref 14–18)
IMM GRANULOCYTES # BLD AUTO: 0.02 K/UL (ref 0–0.04)
IMM GRANULOCYTES NFR BLD AUTO: 0.4 % (ref 0–0.5)
INR PPP: 3.1 (ref 0.8–1.2)
LYMPHOCYTES # BLD AUTO: 0.9 K/UL (ref 1–4.8)
LYMPHOCYTES NFR BLD: 18.9 % (ref 18–48)
MCH RBC QN AUTO: 27.2 PG (ref 27–31)
MCHC RBC AUTO-ENTMCNC: 29.1 G/DL (ref 32–36)
MCV RBC AUTO: 94 FL (ref 82–98)
MONOCYTES # BLD AUTO: 0.6 K/UL (ref 0.3–1)
MONOCYTES NFR BLD: 11.1 % (ref 4–15)
NEUTROPHILS # BLD AUTO: 3.2 K/UL (ref 1.8–7.7)
NEUTROPHILS NFR BLD: 63.6 % (ref 38–73)
NRBC BLD-RTO: 0 /100 WBC
PLATELET # BLD AUTO: 274 K/UL (ref 150–450)
PMV BLD AUTO: 10.4 FL (ref 9.2–12.9)
POTASSIUM SERPL-SCNC: 4.5 MMOL/L (ref 3.5–5.1)
PROT SERPL-MCNC: 5.5 G/DL (ref 6–8.4)
PROTHROMBIN TIME: 31.4 SEC (ref 9–12.5)
RBC # BLD AUTO: 3.12 M/UL (ref 4.6–6.2)
SODIUM SERPL-SCNC: 141 MMOL/L (ref 136–145)
WBC # BLD AUTO: 4.97 K/UL (ref 3.9–12.7)

## 2022-10-24 PROCEDURE — 93793 ANTICOAG MGMT PT WARFARIN: CPT | Mod: S$GLB,,,

## 2022-10-24 PROCEDURE — 80053 COMPREHEN METABOLIC PANEL: CPT | Performed by: INTERNAL MEDICINE

## 2022-10-24 PROCEDURE — 85025 COMPLETE CBC W/AUTO DIFF WBC: CPT | Performed by: INTERNAL MEDICINE

## 2022-10-24 PROCEDURE — 85610 PROTHROMBIN TIME: CPT | Performed by: INTERNAL MEDICINE

## 2022-10-24 PROCEDURE — 93793 PR ANTICOAGULANT MGMT FOR PT TAKING WARFARIN: ICD-10-PCS | Mod: S$GLB,,,

## 2022-10-24 NOTE — TELEPHONE ENCOUNTER
Gricel, pt's HH nurse, has questions regarding wound vac and supplies. Message sent to Dorothy Bruno in plastic surgery with Dr. Tabares.

## 2022-10-24 NOTE — PROGRESS NOTES
Kelly reports correct Warfarin dose, stated patient had broccoli and cauliflower 2 days last week(unsure of date), has been eating lots of crawfish, crabs and pig feet, started taking Collagen Supplement 2 weeks ago, drinks Boost sometimes for breakfast, no other changes noted.

## 2022-10-27 ENCOUNTER — ANTI-COAG VISIT (OUTPATIENT)
Dept: CARDIOLOGY | Facility: CLINIC | Age: 57
End: 2022-10-27
Payer: COMMERCIAL

## 2022-10-27 ENCOUNTER — LAB VISIT (OUTPATIENT)
Dept: LAB | Facility: HOSPITAL | Age: 57
End: 2022-10-27
Attending: INTERNAL MEDICINE
Payer: COMMERCIAL

## 2022-10-27 DIAGNOSIS — I30.0 ACUTE IDIOPATHIC PERICARDITIS: ICD-10-CM

## 2022-10-27 DIAGNOSIS — N18.32 CHRONIC KIDNEY DISEASE (CKD) STAGE G3B/A1, MODERATELY DECREASED GLOMERULAR FILTRATION RATE (GFR) BETWEEN 30-44 ML/MIN/1.73 SQUARE METER AND ALBUMINURIA CREATININE RATIO LESS THAN 30 MG/G: ICD-10-CM

## 2022-10-27 DIAGNOSIS — I48.91 ATRIAL FIBRILLATION: ICD-10-CM

## 2022-10-27 DIAGNOSIS — Z79.01 LONG TERM (CURRENT) USE OF ANTICOAGULANTS: Primary | ICD-10-CM

## 2022-10-27 LAB
INR PPP: 2.5 (ref 0.8–1.2)
PROTHROMBIN TIME: 25.9 SEC (ref 9–12.5)

## 2022-10-27 PROCEDURE — 93793 PR ANTICOAGULANT MGMT FOR PT TAKING WARFARIN: ICD-10-PCS | Mod: S$GLB,,,

## 2022-10-27 PROCEDURE — 93793 ANTICOAG MGMT PT WARFARIN: CPT | Mod: S$GLB,,,

## 2022-10-27 PROCEDURE — 85610 PROTHROMBIN TIME: CPT | Performed by: INTERNAL MEDICINE

## 2022-10-27 NOTE — PROGRESS NOTES
Wife called and she was given lab result, she verified correct coumadin dose, reports no changes, wife was given coumadin instructions and next lab date, verbalized understanding, order was faxed to [facility]

## 2022-10-28 ENCOUNTER — TELEPHONE (OUTPATIENT)
Dept: PLASTIC SURGERY | Facility: CLINIC | Age: 57
End: 2022-10-28
Payer: COMMERCIAL

## 2022-10-28 ENCOUNTER — OFFICE VISIT (OUTPATIENT)
Dept: FAMILY MEDICINE | Facility: CLINIC | Age: 57
End: 2022-10-28
Payer: COMMERCIAL

## 2022-10-28 DIAGNOSIS — I47.20 VT (VENTRICULAR TACHYCARDIA): ICD-10-CM

## 2022-10-28 DIAGNOSIS — Z79.01 ANTICOAGULATION MONITORING, INR RANGE 2-3: ICD-10-CM

## 2022-10-28 DIAGNOSIS — I50.42 CHRONIC COMBINED SYSTOLIC AND DIASTOLIC HEART FAILURE: Chronic | ICD-10-CM

## 2022-10-28 DIAGNOSIS — E03.9 HYPOTHYROIDISM, UNSPECIFIED TYPE: ICD-10-CM

## 2022-10-28 DIAGNOSIS — Z95.811 LVAD (LEFT VENTRICULAR ASSIST DEVICE) PRESENT: Chronic | ICD-10-CM

## 2022-10-28 PROCEDURE — 99214 PR OFFICE/OUTPT VISIT, EST, LEVL IV, 30-39 MIN: ICD-10-PCS | Mod: 95,,, | Performed by: FAMILY MEDICINE

## 2022-10-28 PROCEDURE — 99214 OFFICE O/P EST MOD 30 MIN: CPT | Mod: 95,,, | Performed by: FAMILY MEDICINE

## 2022-10-28 RX ORDER — OMEGA-3-ACID ETHYL ESTERS 1 G/1
2 CAPSULE, LIQUID FILLED ORAL 2 TIMES DAILY
Qty: 360 CAPSULE | Refills: 3 | Status: SHIPPED | OUTPATIENT
Start: 2022-10-28 | End: 2023-10-24 | Stop reason: SDUPTHER

## 2022-10-28 RX ORDER — WARFARIN SODIUM 5 MG/1
TABLET ORAL
Qty: 135 TABLET | Refills: 3
Start: 2022-10-28 | End: 2023-10-24 | Stop reason: SDUPTHER

## 2022-10-28 RX ORDER — MIRTAZAPINE 30 MG/1
30 TABLET, FILM COATED ORAL NIGHTLY
Qty: 90 TABLET | Refills: 1 | Status: SHIPPED | OUTPATIENT
Start: 2022-10-28 | End: 2022-11-07 | Stop reason: SDUPTHER

## 2022-10-28 RX ORDER — PANTOPRAZOLE SODIUM 40 MG/1
40 TABLET, DELAYED RELEASE ORAL
Qty: 90 TABLET | Refills: 3 | Status: SHIPPED | OUTPATIENT
Start: 2022-10-28 | End: 2023-10-24 | Stop reason: SDUPTHER

## 2022-10-28 RX ORDER — AMIODARONE HYDROCHLORIDE 200 MG/1
400 TABLET ORAL DAILY
Qty: 180 TABLET | Refills: 3 | Status: SHIPPED | OUTPATIENT
Start: 2022-10-28 | End: 2023-09-12 | Stop reason: SDUPTHER

## 2022-10-28 RX ORDER — LEVOTHYROXINE SODIUM 50 UG/1
50 TABLET ORAL
Qty: 90 TABLET | Refills: 3 | Status: ON HOLD | OUTPATIENT
Start: 2022-10-28 | End: 2022-12-03 | Stop reason: SDUPTHER

## 2022-10-28 NOTE — PROGRESS NOTES
HISTORY OF PRESENT ILLNESS:  Tim Richards is a 56 y.o. male who presents to the clinic today for Follow-up  .     The patient location is: [place]  The chief complaint leading to consultation is: check up    Visit type: audiovisual    Face to Face time with patient: 5  22 minutes of total time spent on the encounter, which includes face to face time and non-face to face time preparing to see the patient (eg, review of tests), Obtaining and/or reviewing separately obtained history, Documenting clinical information in the electronic or other health record, Independently interpreting results (not separately reported) and communicating results to the patient/family/caregiver, or Care coordination (not separately reported).         Each patient to whom he or she provides medical services by telemedicine is:  (1) informed of the relationship between the physician and patient and the respective role of any other health care provider with respect to management of the patient; and (2) notified that he or she may decline to receive medical services by telemedicine and may withdraw from such care at any time.    Notes:   Answers submitted by the patient for this visit:  Review of Systems Questionnaire (Submitted on 10/28/2022)  activity change: Yes  unexpected weight change: No  rhinorrhea: No  trouble swallowing: No  visual disturbance: No  chest tightness: No  polyuria: No  difficulty urinating: No  joint swelling: No  arthralgias: No  confusion: No  dysphoric mood: No    Check up on conditions and he is hagning in there.  His anxiety is still a problem and needs his medicaitons refilled  Has been protecting from covid.  He is followed closely by the transplant team with his LVAD and doing relatively well currently  He was in hospital this year 3 times for conditions related to the LVAD and has done well.      Patient Active Problem List   Diagnosis    Cigarette nicotine dependence without complication    Alcohol abuse     Pulmonary nodule seen on imaging study    Chronic combined systolic and diastolic congestive heart failure    Palliative care encounter    Hypoglycemia    Hyperbilirubinemia    Anemia    Hypertension    LVAD (left ventricular assist device) present    Pre-transplant evaluation for heart transplant    GIB (gastrointestinal bleeding)    Thrombocytopenia, unspecified    GI bleed    VT (ventricular tachycardia)    Amiodarone-induced hyperthyroidism    Substance or medication-induced sleep disorder, insomnia type    VF (ventricular fibrillation)    CHICHI (obstructive sleep apnea)    Post traumatic stress disorder (PTSD)    Acute on chronic combined systolic and diastolic congestive heart failure    History of ventricular tachycardia    Severe malnutrition    Atrial flutter    Adjustment disorder with mixed anxiety and depressed mood    Hyponatremia    Stage 3b chronic kidney disease    Hypertensive cardiovascular-renal disease, stage 1-4 or unspecified chronic kidney disease, with heart failure    High risk medications (not anticoagulants) long-term use    Dilated cardiomyopathy    Anticoagulation monitoring, INR range 2-3    Impaired mobility    Acute on chronic combined systolic and diastolic heart failure    Left ventricular assist device (LVAD) complication, subsequent encounter    History of COVID-19    Groin hematoma    Pseudoaneurysm of right femoral artery    Mycotic aneurysm    Tracheostomy present           CARE TEAM:  Patient Care Team:  Diego Daniel MD as PCP - General  Nader Chiu MD (Inactive) as Consulting Physician (Interventional Cardiology)  Bridget Freeman, RN as VAD Coordinator (Advanced Heart Failure & Transplant Cardiology)  Paz Bai, EMILY as VAD Coordinator (Advanced Heart Failure & Transplant Cardiology)  Ivette Guerrier, EMILY as VAD Coordinator (Advanced Heart Failure & Transplant Cardiology)  Desirae Alexander RN as VAD Coordinator (Advanced Heart Failure & Transplant Cardiology)  Aliya  L. Kareem-Zuniga, LPN as Care Coordinator  Kwame Hill MD as Consulting Physician (Cardiothoracic Surgery)         Review of Systems   HENT:  Negative for hearing loss.    Eyes:  Negative for discharge.   Respiratory:  Negative for wheezing.    Cardiovascular:  Positive for palpitations. Negative for chest pain.   Gastrointestinal:  Negative for blood in stool, constipation, diarrhea and vomiting.   Genitourinary:  Negative for hematuria and urgency.   Musculoskeletal:  Negative for neck pain.   Neurological:  Positive for weakness. Negative for headaches.   Endo/Heme/Allergies:  Negative for polydipsia.         PHYSICAL EXAM:  There were no vitals taken for this visit.  Wt Readings from Last 5 Encounters:   11/09/22 97.3 kg (214 lb 6.4 oz)   11/03/22 94.3 kg (208 lb)   11/03/22 94.8 kg (209 lb)   11/03/22 94.3 kg (208 lb)   10/04/22 89.4 kg (197 lb 1.5 oz)     BP Readings from Last 5 Encounters:   11/03/22 (!) 82/0   10/05/22 (!) 80/0   09/22/22 (!) 90/0   09/01/22 (!) 66/0   05/26/22 130/88                 Medication List with Changes/Refills   New Medications    VORICONAZOLE (VFEND) 200 MG TAB    Take 1 tablet (200 mg total) by mouth 2 (two) times daily. Take 400 mg (2 tabs) by mouth twice a day for 1 day then 200 mg (1 tab) twice a day   Current Medications    ACETAMINOPHEN (TYLENOL) 500 MG TABLET    Take 2 tablets (1,000 mg total) by mouth 3 (three) times daily as needed for Pain.    ALPRAZOLAM (XANAX) 1 MG TABLET    Take 0.5-1 tablets (0.5-1 mg total) by mouth 2 (two) times daily as needed for Anxiety.    FERROUS GLUCONATE (FERGON) 324 MG TABLET    Take 1 tablet (324 mg total) by mouth with lunch.    MEXILETINE (MEXITIL) 250 MG CAP    Take 1 capsule (250 mg total) by mouth every 8 (eight) hours.    POLYETHYLENE GLYCOL (GLYCOLAX) 17 GRAM PWPK    Take 17 g by mouth once daily.    SODIUM CHLORIDE 0.9% SOLP 100 ML WITH ERTAPENEM 1 GRAM SOLR 1 G    Inject 1 g into the vein once daily.   Changed and/or  Refilled Medications    Modified Medication Previous Medication    AMIODARONE (PACERONE) 200 MG TAB amiodarone (PACERONE) 200 MG Tab       Take 2 tablets (400 mg total) by mouth once daily.    Take 2 tablets (400 mg total) by mouth once daily.    LEVOTHYROXINE (SYNTHROID) 50 MCG TABLET levothyroxine (SYNTHROID) 50 MCG tablet       Take 1 tablet (50 mcg total) by mouth before breakfast.    Take 1 tablet (50 mcg total) by mouth before breakfast.    MAGNESIUM OXIDE (MAG-OX) 400 MG (241.3 MG MAGNESIUM) TABLET magnesium oxide (MAG-OX) 400 mg (241.3 mg magnesium) tablet       Take 1 tablet (400 mg total) by mouth 2 (two) times daily.    Take 1 tablet (400 mg total) by mouth 2 (two) times daily.    MIRTAZAPINE (REMERON) 30 MG TABLET mirtazapine (REMERON) 30 MG tablet       Take 1 tablet (30 mg total) by mouth every evening.    Take 1 tablet (30 mg total) by mouth every evening.    OMEGA-3 ACID ETHYL ESTERS (LOVAZA) 1 GRAM CAPSULE omega-3 acid ethyl esters (LOVAZA) 1 gram capsule       Take 2 capsules (2 g total) by mouth 2 (two) times daily.    Take 2 capsules (2 g total) by mouth 2 (two) times daily.    PANTOPRAZOLE (PROTONIX) 40 MG TABLET pantoprazole (PROTONIX) 40 MG tablet       Take 1 tablet (40 mg total) by mouth daily with lunch.    Take 1 tablet (40 mg total) by mouth daily with lunch.    WARFARIN (COUMADIN) 5 MG TABLET warfarin (COUMADIN) 5 MG tablet       Take a half tablet (2.5mg) by mouth on 10/5 only. Then take 1 tablet (5mg) daily    Take a half tablet (2.5mg) by mouth on 10/5 only. Then take 1 tablet (5mg) daily       ASSESSMENT AND PLAN:    Problem List Items Addressed This Visit       LVAD (left ventricular assist device) present    Relevant Medications    amiodarone (PACERONE) 200 MG Tab    pantoprazole (PROTONIX) 40 MG tablet    levothyroxine (SYNTHROID) 50 MCG tablet    omega-3 acid ethyl esters (LOVAZA) 1 gram capsule    warfarin (COUMADIN) 5 MG tablet    VT (ventricular tachycardia)    Overview                 Relevant Medications    amiodarone (PACERONE) 200 MG Tab    Anticoagulation monitoring, INR range 2-3    Relevant Medications    warfarin (COUMADIN) 5 MG tablet     Other Visit Diagnoses       Chronic combined systolic and diastolic heart failure  (Chronic)       Relevant Medications    amiodarone (PACERONE) 200 MG Tab    pantoprazole (PROTONIX) 40 MG tablet    levothyroxine (SYNTHROID) 50 MCG tablet    omega-3 acid ethyl esters (LOVAZA) 1 gram capsule    warfarin (COUMADIN) 5 MG tablet    Hypothyroidism, unspecified type        Relevant Medications    levothyroxine (SYNTHROID) 50 MCG tablet            Future Appointments   Date Time Provider Department Center   11/23/2022  9:45 AM Kole Tabares MD [facility] PLASTIC [facility]   11/23/2022 10:30 AM Jessica Perez MD [facility] ID [facility]   12/8/2022 11:00 AM LAB, APPOINTMENT [facility] LAB VNP [facility]   12/9/2022 10:00 AM HOME MONITOR DEVICE CHECK, [facility] INGRID [facility]   1/3/2023  9:00 AM María Brice MD [facility] ENDODIA [facility]   1/27/2023  1:40 PM COORDINATED DEVICE CHECK [facility] INGRID [facility]   1/27/2023  2:15 PM EKG, APPT [facility] EKG [facility]   1/27/2023  3:00 PM Harry Yun MD [facility] ARRHYTH [facility]       No follow-ups on file. or sooner as needed.

## 2022-10-28 NOTE — TELEPHONE ENCOUNTER
Contact return call to Spenser NGUYEN, staff Gricel.  Reports unable to answer the need for the patient regarding his wound vac and will reach out to the attending RN and get her to return my call.   Call ended.

## 2022-10-31 ENCOUNTER — DOCUMENT SCAN (OUTPATIENT)
Dept: HOME HEALTH SERVICES | Facility: HOSPITAL | Age: 57
End: 2022-10-31
Payer: COMMERCIAL

## 2022-10-31 ENCOUNTER — ANTI-COAG VISIT (OUTPATIENT)
Dept: CARDIOLOGY | Facility: CLINIC | Age: 57
End: 2022-10-31
Payer: COMMERCIAL

## 2022-10-31 ENCOUNTER — LAB VISIT (OUTPATIENT)
Dept: LAB | Facility: HOSPITAL | Age: 57
End: 2022-10-31
Attending: INTERNAL MEDICINE
Payer: COMMERCIAL

## 2022-10-31 DIAGNOSIS — N18.32 CHRONIC KIDNEY DISEASE (CKD) STAGE G3B/A1, MODERATELY DECREASED GLOMERULAR FILTRATION RATE (GFR) BETWEEN 30-44 ML/MIN/1.73 SQUARE METER AND ALBUMINURIA CREATININE RATIO LESS THAN 30 MG/G: Primary | ICD-10-CM

## 2022-10-31 DIAGNOSIS — I48.91 ATRIAL FIBRILLATION: ICD-10-CM

## 2022-10-31 DIAGNOSIS — Z79.01 LONG TERM (CURRENT) USE OF ANTICOAGULANTS: ICD-10-CM

## 2022-10-31 DIAGNOSIS — I13.0 HYPERTENSIVE HEART AND RENAL DISEASE WITH CONGESTIVE HEART FAILURE: ICD-10-CM

## 2022-10-31 LAB
ALBUMIN SERPL BCP-MCNC: 2.2 G/DL (ref 3.5–5.2)
ALP SERPL-CCNC: 66 U/L (ref 55–135)
ALT SERPL W/O P-5'-P-CCNC: 16 U/L (ref 10–44)
ANION GAP SERPL CALC-SCNC: 7 MMOL/L (ref 8–16)
AST SERPL-CCNC: 32 U/L (ref 10–40)
BASOPHILS # BLD AUTO: 0.01 K/UL (ref 0–0.2)
BASOPHILS NFR BLD: 0.2 % (ref 0–1.9)
BILIRUB SERPL-MCNC: 0.3 MG/DL (ref 0.1–1)
BUN SERPL-MCNC: 10 MG/DL (ref 6–20)
CALCIUM SERPL-MCNC: 8.3 MG/DL (ref 8.7–10.5)
CHLORIDE SERPL-SCNC: 109 MMOL/L (ref 95–110)
CO2 SERPL-SCNC: 25 MMOL/L (ref 23–29)
CREAT SERPL-MCNC: 1.3 MG/DL (ref 0.5–1.4)
DIFFERENTIAL METHOD: ABNORMAL
EOSINOPHIL # BLD AUTO: 0.3 K/UL (ref 0–0.5)
EOSINOPHIL NFR BLD: 6.9 % (ref 0–8)
ERYTHROCYTE [DISTWIDTH] IN BLOOD BY AUTOMATED COUNT: 16.4 % (ref 11.5–14.5)
EST. GFR  (NO RACE VARIABLE): >60 ML/MIN/1.73 M^2
FUNGUS SPEC CULT: ABNORMAL
FUNGUS SPEC CULT: NORMAL
GLUCOSE SERPL-MCNC: 51 MG/DL (ref 70–110)
HCT VFR BLD AUTO: 27.6 % (ref 40–54)
HGB BLD-MCNC: 8 G/DL (ref 14–18)
IMM GRANULOCYTES # BLD AUTO: 0.02 K/UL (ref 0–0.04)
IMM GRANULOCYTES NFR BLD AUTO: 0.4 % (ref 0–0.5)
INR PPP: 2.4 (ref 0.8–1.2)
LYMPHOCYTES # BLD AUTO: 1 K/UL (ref 1–4.8)
LYMPHOCYTES NFR BLD: 20.9 % (ref 18–48)
MCH RBC QN AUTO: 26.7 PG (ref 27–31)
MCHC RBC AUTO-ENTMCNC: 29 G/DL (ref 32–36)
MCV RBC AUTO: 92 FL (ref 82–98)
MONOCYTES # BLD AUTO: 0.5 K/UL (ref 0.3–1)
MONOCYTES NFR BLD: 11 % (ref 4–15)
NEUTROPHILS # BLD AUTO: 2.8 K/UL (ref 1.8–7.7)
NEUTROPHILS NFR BLD: 60.6 % (ref 38–73)
NRBC BLD-RTO: 0 /100 WBC
PLATELET # BLD AUTO: 293 K/UL (ref 150–450)
PMV BLD AUTO: 10.1 FL (ref 9.2–12.9)
POTASSIUM SERPL-SCNC: 3.9 MMOL/L (ref 3.5–5.1)
PROT SERPL-MCNC: 6.1 G/DL (ref 6–8.4)
PROTHROMBIN TIME: 25.1 SEC (ref 9–12.5)
RBC # BLD AUTO: 3 M/UL (ref 4.6–6.2)
SODIUM SERPL-SCNC: 141 MMOL/L (ref 136–145)
WBC # BLD AUTO: 4.64 K/UL (ref 3.9–12.7)

## 2022-10-31 PROCEDURE — 93793 PR ANTICOAGULANT MGMT FOR PT TAKING WARFARIN: ICD-10-PCS | Mod: S$GLB,,,

## 2022-10-31 PROCEDURE — 93793 ANTICOAG MGMT PT WARFARIN: CPT | Mod: S$GLB,,,

## 2022-10-31 PROCEDURE — 80053 COMPREHEN METABOLIC PANEL: CPT | Performed by: INTERNAL MEDICINE

## 2022-10-31 PROCEDURE — 85610 PROTHROMBIN TIME: CPT | Performed by: INTERNAL MEDICINE

## 2022-10-31 PROCEDURE — 85025 COMPLETE CBC W/AUTO DIFF WBC: CPT | Performed by: INTERNAL MEDICINE

## 2022-11-01 ENCOUNTER — PATIENT MESSAGE (OUTPATIENT)
Dept: CARDIOTHORACIC SURGERY | Facility: CLINIC | Age: 57
End: 2022-11-01
Payer: COMMERCIAL

## 2022-11-01 DIAGNOSIS — Z95.811 LVAD (LEFT VENTRICULAR ASSIST DEVICE) PRESENT: Primary | ICD-10-CM

## 2022-11-02 NOTE — PROGRESS NOTES
Weekly labs reviewed, WDL for patient baseline, will keep close eye on CR as it is trending up. Will continue to monitor.

## 2022-11-03 ENCOUNTER — HOSPITAL ENCOUNTER (OUTPATIENT)
Dept: PULMONOLOGY | Facility: CLINIC | Age: 57
Discharge: HOME | End: 2022-11-03
Payer: COMMERCIAL

## 2022-11-03 ENCOUNTER — HOSPITAL ENCOUNTER (OUTPATIENT)
Dept: VASCULAR SURGERY | Facility: CLINIC | Age: 57
Discharge: HOME | End: 2022-11-03
Attending: SURGERY
Payer: COMMERCIAL

## 2022-11-03 ENCOUNTER — OFFICE VISIT (OUTPATIENT)
Dept: VASCULAR SURGERY | Facility: CLINIC | Age: 57
End: 2022-11-03
Attending: SURGERY
Payer: COMMERCIAL

## 2022-11-03 ENCOUNTER — PATIENT MESSAGE (OUTPATIENT)
Dept: TRANSPLANT | Facility: CLINIC | Age: 57
End: 2022-11-03

## 2022-11-03 ENCOUNTER — LAB VISIT (OUTPATIENT)
Dept: LAB | Facility: HOSPITAL | Age: 57
End: 2022-11-03
Attending: INTERNAL MEDICINE
Payer: COMMERCIAL

## 2022-11-03 ENCOUNTER — ANTI-COAG VISIT (OUTPATIENT)
Dept: CARDIOLOGY | Facility: CLINIC | Age: 57
End: 2022-11-03
Payer: COMMERCIAL

## 2022-11-03 ENCOUNTER — CLINICAL SUPPORT (OUTPATIENT)
Dept: TRANSPLANT | Facility: CLINIC | Age: 57
End: 2022-11-03
Payer: COMMERCIAL

## 2022-11-03 ENCOUNTER — OFFICE VISIT (OUTPATIENT)
Dept: TRANSPLANT | Facility: CLINIC | Age: 57
End: 2022-11-03
Payer: COMMERCIAL

## 2022-11-03 VITALS
TEMPERATURE: 99 F | BODY MASS INDEX: 27.57 KG/M2 | SYSTOLIC BLOOD PRESSURE: 82 MMHG | BODY MASS INDEX: 27.7 KG/M2 | WEIGHT: 208 LBS | HEIGHT: 73 IN | WEIGHT: 209 LBS | HEIGHT: 73 IN

## 2022-11-03 VITALS — WEIGHT: 208 LBS | BODY MASS INDEX: 27.57 KG/M2 | HEIGHT: 73 IN

## 2022-11-03 DIAGNOSIS — I72.4 PSEUDOANEURYSM OF RIGHT FEMORAL ARTERY: Primary | ICD-10-CM

## 2022-11-03 DIAGNOSIS — Z95.811 LVAD (LEFT VENTRICULAR ASSIST DEVICE) PRESENT: ICD-10-CM

## 2022-11-03 DIAGNOSIS — N18.32 STAGE 3B CHRONIC KIDNEY DISEASE: ICD-10-CM

## 2022-11-03 DIAGNOSIS — I49.01 VF (VENTRICULAR FIBRILLATION): ICD-10-CM

## 2022-11-03 DIAGNOSIS — Z95.811 HEART REPLACED BY HEART ASSIST DEVICE: ICD-10-CM

## 2022-11-03 DIAGNOSIS — I50.42 CHRONIC COMBINED SYSTOLIC AND DIASTOLIC HEART FAILURE: ICD-10-CM

## 2022-11-03 DIAGNOSIS — I72.4 PSEUDOANEURYSM OF RIGHT FEMORAL ARTERY: ICD-10-CM

## 2022-11-03 DIAGNOSIS — T82.9XXD LEFT VENTRICULAR ASSIST DEVICE (LVAD) COMPLICATION, SUBSEQUENT ENCOUNTER: ICD-10-CM

## 2022-11-03 DIAGNOSIS — G47.33 OSA (OBSTRUCTIVE SLEEP APNEA): ICD-10-CM

## 2022-11-03 DIAGNOSIS — Z95.811 LVAD (LEFT VENTRICULAR ASSIST DEVICE) PRESENT: Chronic | ICD-10-CM

## 2022-11-03 DIAGNOSIS — Z95.811 LVAD (LEFT VENTRICULAR ASSIST DEVICE) PRESENT: Primary | Chronic | ICD-10-CM

## 2022-11-03 DIAGNOSIS — I42.0 DILATED CARDIOMYOPATHY: ICD-10-CM

## 2022-11-03 LAB
ALBUMIN SERPL BCP-MCNC: 2.5 G/DL (ref 3.5–5.2)
ALP SERPL-CCNC: 80 U/L (ref 55–135)
ALT SERPL W/O P-5'-P-CCNC: 14 U/L (ref 10–44)
ANION GAP SERPL CALC-SCNC: 9 MMOL/L (ref 8–16)
AST SERPL-CCNC: 22 U/L (ref 10–40)
BASOPHILS # BLD AUTO: 0.01 K/UL (ref 0–0.2)
BASOPHILS NFR BLD: 0.2 % (ref 0–1.9)
BILIRUB DIRECT SERPL-MCNC: 0.2 MG/DL (ref 0.1–0.3)
BILIRUB SERPL-MCNC: 0.3 MG/DL (ref 0.1–1)
BNP SERPL-MCNC: 599 PG/ML (ref 0–99)
BUN SERPL-MCNC: 10 MG/DL (ref 6–20)
CALCIUM SERPL-MCNC: 9 MG/DL (ref 8.7–10.5)
CHLORIDE SERPL-SCNC: 109 MMOL/L (ref 95–110)
CO2 SERPL-SCNC: 25 MMOL/L (ref 23–29)
CREAT SERPL-MCNC: 1.4 MG/DL (ref 0.5–1.4)
CRP SERPL-MCNC: 41.9 MG/L (ref 0–8.2)
DIFFERENTIAL METHOD: ABNORMAL
EOSINOPHIL # BLD AUTO: 0.2 K/UL (ref 0–0.5)
EOSINOPHIL NFR BLD: 4.3 % (ref 0–8)
ERYTHROCYTE [DISTWIDTH] IN BLOOD BY AUTOMATED COUNT: 16.2 % (ref 11.5–14.5)
EST. GFR  (NO RACE VARIABLE): 59.4 ML/MIN/1.73 M^2
GLUCOSE SERPL-MCNC: 78 MG/DL (ref 70–110)
HCT VFR BLD AUTO: 30.4 % (ref 40–54)
HGB BLD-MCNC: 8.7 G/DL (ref 14–18)
IMM GRANULOCYTES # BLD AUTO: 0.02 K/UL (ref 0–0.04)
IMM GRANULOCYTES NFR BLD AUTO: 0.4 % (ref 0–0.5)
INR PPP: 2.4 (ref 0.8–1.2)
LDH SERPL L TO P-CCNC: 323 U/L (ref 110–260)
LYMPHOCYTES # BLD AUTO: 1 K/UL (ref 1–4.8)
LYMPHOCYTES NFR BLD: 19.4 % (ref 18–48)
MAGNESIUM SERPL-MCNC: 2.2 MG/DL (ref 1.6–2.6)
MCH RBC QN AUTO: 26 PG (ref 27–31)
MCHC RBC AUTO-ENTMCNC: 28.6 G/DL (ref 32–36)
MCV RBC AUTO: 91 FL (ref 82–98)
MONOCYTES # BLD AUTO: 0.6 K/UL (ref 0.3–1)
MONOCYTES NFR BLD: 11.2 % (ref 4–15)
NEUTROPHILS # BLD AUTO: 3.3 K/UL (ref 1.8–7.7)
NEUTROPHILS NFR BLD: 64.5 % (ref 38–73)
NRBC BLD-RTO: 0 /100 WBC
PHOSPHATE SERPL-MCNC: 3.1 MG/DL (ref 2.7–4.5)
PLATELET # BLD AUTO: 371 K/UL (ref 150–450)
PMV BLD AUTO: 10.2 FL (ref 9.2–12.9)
POTASSIUM SERPL-SCNC: 4.7 MMOL/L (ref 3.5–5.1)
PREALB SERPL-MCNC: 15 MG/DL (ref 20–43)
PROT SERPL-MCNC: 7.1 G/DL (ref 6–8.4)
PROTHROMBIN TIME: 23.6 SEC (ref 9–12.5)
RBC # BLD AUTO: 3.35 M/UL (ref 4.6–6.2)
SODIUM SERPL-SCNC: 143 MMOL/L (ref 136–145)
WBC # BLD AUTO: 5.1 K/UL (ref 3.9–12.7)

## 2022-11-03 PROCEDURE — 1159F PR MEDICATION LIST DOCUMENTED IN MEDICAL RECORD: ICD-10-PCS | Mod: CPTII,S$GLB,, | Performed by: SURGERY

## 2022-11-03 PROCEDURE — 99999 PR PBB SHADOW E&M-EST. PATIENT-LVL III: ICD-10-PCS | Mod: PBBFAC,,, | Performed by: SURGERY

## 2022-11-03 PROCEDURE — 99999 PR PBB SHADOW E&M-EST. PATIENT-LVL I: CPT | Mod: PBBFAC,,,

## 2022-11-03 PROCEDURE — 93750 OP LVAD INTERROGATION: ICD-10-PCS | Mod: S$GLB,,, | Performed by: INTERNAL MEDICINE

## 2022-11-03 PROCEDURE — 1111F PR DISCHARGE MEDS RECONCILED W/ CURRENT OUTPATIENT MED LIST: ICD-10-PCS | Mod: CPTII,S$GLB,, | Performed by: INTERNAL MEDICINE

## 2022-11-03 PROCEDURE — 99024 POSTOP FOLLOW-UP VISIT: CPT | Mod: S$GLB,,, | Performed by: SURGERY

## 2022-11-03 PROCEDURE — 83615 LACTATE (LD) (LDH) ENZYME: CPT | Performed by: INTERNAL MEDICINE

## 2022-11-03 PROCEDURE — 99024 PR POST-OP FOLLOW-UP VISIT: ICD-10-PCS | Mod: S$GLB,,, | Performed by: SURGERY

## 2022-11-03 PROCEDURE — 99215 OFFICE O/P EST HI 40 MIN: CPT | Mod: S$GLB,,, | Performed by: INTERNAL MEDICINE

## 2022-11-03 PROCEDURE — 94618 PULMONARY STRESS TESTING: ICD-10-PCS | Mod: S$GLB,,, | Performed by: INTERNAL MEDICINE

## 2022-11-03 PROCEDURE — 80053 COMPREHEN METABOLIC PANEL: CPT | Performed by: INTERNAL MEDICINE

## 2022-11-03 PROCEDURE — 83880 ASSAY OF NATRIURETIC PEPTIDE: CPT | Performed by: INTERNAL MEDICINE

## 2022-11-03 PROCEDURE — 83735 ASSAY OF MAGNESIUM: CPT | Performed by: INTERNAL MEDICINE

## 2022-11-03 PROCEDURE — 99999 PR PBB SHADOW E&M-EST. PATIENT-LVL IV: CPT | Mod: PBBFAC,,, | Performed by: INTERNAL MEDICINE

## 2022-11-03 PROCEDURE — 93923 PR NON-INVASIVE PHYSIOLOGIC STUDY EXTREMITY 3 LEVELS: ICD-10-PCS | Mod: S$GLB,,, | Performed by: SURGERY

## 2022-11-03 PROCEDURE — 84134 ASSAY OF PREALBUMIN: CPT | Performed by: INTERNAL MEDICINE

## 2022-11-03 PROCEDURE — 85610 PROTHROMBIN TIME: CPT | Performed by: INTERNAL MEDICINE

## 2022-11-03 PROCEDURE — 3008F PR BODY MASS INDEX (BMI) DOCUMENTED: ICD-10-PCS | Mod: CPTII,S$GLB,, | Performed by: SURGERY

## 2022-11-03 PROCEDURE — 36415 COLL VENOUS BLD VENIPUNCTURE: CPT | Performed by: INTERNAL MEDICINE

## 2022-11-03 PROCEDURE — 3008F PR BODY MASS INDEX (BMI) DOCUMENTED: ICD-10-PCS | Mod: CPTII,S$GLB,, | Performed by: INTERNAL MEDICINE

## 2022-11-03 PROCEDURE — 99999 PR PBB SHADOW E&M-EST. PATIENT-LVL III: CPT | Mod: PBBFAC,,, | Performed by: SURGERY

## 2022-11-03 PROCEDURE — 93750 INTERROGATION VAD IN PERSON: CPT | Mod: S$GLB,,, | Performed by: INTERNAL MEDICINE

## 2022-11-03 PROCEDURE — 82248 BILIRUBIN DIRECT: CPT | Performed by: INTERNAL MEDICINE

## 2022-11-03 PROCEDURE — 1111F DSCHRG MED/CURRENT MED MERGE: CPT | Mod: CPTII,S$GLB,, | Performed by: INTERNAL MEDICINE

## 2022-11-03 PROCEDURE — 94618 PULMONARY STRESS TESTING: CPT | Mod: S$GLB,,, | Performed by: INTERNAL MEDICINE

## 2022-11-03 PROCEDURE — 99999 PR PBB SHADOW E&M-EST. PATIENT-LVL IV: ICD-10-PCS | Mod: PBBFAC,,, | Performed by: INTERNAL MEDICINE

## 2022-11-03 PROCEDURE — 3008F BODY MASS INDEX DOCD: CPT | Mod: CPTII,S$GLB,, | Performed by: SURGERY

## 2022-11-03 PROCEDURE — 99215 PR OFFICE/OUTPT VISIT, EST, LEVL V, 40-54 MIN: ICD-10-PCS | Mod: S$GLB,,, | Performed by: INTERNAL MEDICINE

## 2022-11-03 PROCEDURE — 84100 ASSAY OF PHOSPHORUS: CPT | Performed by: INTERNAL MEDICINE

## 2022-11-03 PROCEDURE — 85025 COMPLETE CBC W/AUTO DIFF WBC: CPT | Performed by: INTERNAL MEDICINE

## 2022-11-03 PROCEDURE — 1159F MED LIST DOCD IN RCRD: CPT | Mod: CPTII,S$GLB,, | Performed by: SURGERY

## 2022-11-03 PROCEDURE — 86140 C-REACTIVE PROTEIN: CPT | Performed by: INTERNAL MEDICINE

## 2022-11-03 PROCEDURE — 99999 PR PBB SHADOW E&M-EST. PATIENT-LVL I: ICD-10-PCS | Mod: PBBFAC,,,

## 2022-11-03 PROCEDURE — 3008F BODY MASS INDEX DOCD: CPT | Mod: CPTII,S$GLB,, | Performed by: INTERNAL MEDICINE

## 2022-11-03 PROCEDURE — 93923 UPR/LXTR ART STDY 3+ LVLS: CPT | Mod: S$GLB,,, | Performed by: SURGERY

## 2022-11-03 NOTE — PROCEDURES
Tim Richards is a 56 y.o.  male patient, who presents for a 6 minute walk test ordered by MD Shae.  The diagnosis is  (LVAD); Congestive Heart Failure.  The patient's BMI is 27.6 kg/m2.  Predicted distance (lower limit of normal) is 450.46 meters.      Test Results:    The test was completed with stops.  The patient stopped 2 times for a total of 65 seconds.  The total time walked was 295 seconds.  During walking, the patient reported:  Dyspnea; Lightheadedness; Dizziness; Leg Weakness.  The patient used no assistive devices during testing.     11/03/2022---------Distance: 121.92 meters (400 feet)     O2 Sat % Supplemental Oxygen Heart Rate Blood Pressure Renan Scale   Pre-exercise  (Resting) 94 % Room Air 33 bpm Unable to obtain 0.5   During Exercise Unable to obtain Room Air Unable to obtain Unable to obtain 5-6   Post-exercise  (Recovery) Unable to obtain Room Air Unable to obtain       Recovery Time:  120 seconds  The patient walked the first 15 feet in 11.38 seconds.    Performing nurse/tech: NILS Ferguson      PREVIOUS STUDY:   05/03/2022---------Distance: 304.8 meters (1000 feet)       O2 Sat % Supplemental Oxygen Heart Rate Blood Pressure Renan Scale   Pre-exercise  (Resting) 95 % Room Air 81 bpm Unable to obtain (LVAD) 4   During Exercise 94 % Room Air 152 bpm   mmHg 9   Post-exercise  (Recovery) 97 %    90 bpm   mmHg       Recovery Time: 106 seconds (15 ft in 4.81 seconds)      CLINICAL INTERPRETATION:  Six minute walk distance is 121.92 meters (400 feet) with heavy dyspnea.  Oxygen saturation during exercise could not be determined due to technical limitations of pulse oximetry.  Bradycardia was present prior to exercise.  The patient reported non-pulmonary symptoms during exercise.  Severe exercise impairment is likely due to cardiovascular causes and subjective symptoms.  The patient did complete the study, walking 295 seconds of the 360 second test.  Since the previous study in May  2022, exercise capacity is significantly worse.  Based upon age and body mass index, exercise capacity is less than predicted.

## 2022-11-03 NOTE — PROGRESS NOTES
"Date of Implant with Heartmate 3 LVAD: 3/8/18 exchanged 7/13/22    PATIENT ARRIVED IN CLINIC:  Wheelchair  Accompanied by: self  Complaints/reason for visit today: routine    Vitals  Temperature, oral:   Temp Readings from Last 1 Encounters:   11/03/22 98.6 °F (37 °C) (Oral)     Blood Pressure:   BP Readings from Last 3 Encounters:   11/03/22 (!) 82/0   10/05/22 (!) 80/0   09/22/22 (!) 90/0        VAD Interrogation:  TXP SACHI INTERROGATIONS 11/3/2022 10/5/2022 10/5/2022   Type HeartMate3 - -   Flow 5.6 - -   Speed 6400 - -   PI 1.8 - -   Power (Jackson) 5.6 - -   LSL 6000 - -   Pulsatility - Intermittent pulse Pulse     HCT:   Lab Results   Component Value Date    HCT 30.4 (L) 11/03/2022    HCT 31 (L) 09/27/2022       History Log: RBB  Problems / Issues / Alarms with VAD if any:  Pi events with speed drops noted in history  VAD Sounds: HM3     VAD Binder With Patient: no   Reviewed VAD Numbers In Binder: no  Enrolled in Care Companion: yes    Equipment:  Emergency Equipment With Patient: yes   Any Equipment Issues: None noted   It is medically necessary to ensure patient has properly functioning equipment and wearables to prevent infection, injury or death to patient.     DLES Assessment:  Appearance Of Driveline:   Old DLES on right="1".  Drsg CDI  New DLES on left="1.5", small amount light drainage noted.  Asked pt to clean daily instead of every other day.    Right groin= Pink tissue.  Drsg CDI.  No wound vac.  Pt going to see Vascular after this appt, will discuss with them regarding wound vac.   Antibiotics: YES   Velour: no  Manual & Visual Inspection Of Driveline: No kinks or tears noted  Stabilization Device In Use: yes, sun securement device    Heartmate 3 Module Cable:  No yellow exposed and Attempted to unscrew modular cable to ensure it will be able to come lose in the event we ever need to change the modular cable while patient held the driveline in place so it would not move. Modular cable connection " able to be unscrewed and re-tightened. Instructed pt to perform this weekly.    Assessment:   PAIN: NO  Complaints Of Nausea / Vomiting: None noted    Appearance and Frequency Of Stools: normal and formed without blood & daily  Color Of Urine: clear/yellow  Coping/Depression/Anxiety: coping okay and anxious  Sleep Habits: 8 hrs /night  Sleep Aids: None noted  Showering: No  Activity/Exercise: Therapy at home  Driving: No.    DLES Dressing Care:   Frequency of Dressing Changes: every other day & daily kit  Pt In Need Of Management Kits?:no   It is medically necessary to have VAD management kits in order to prevent infection or to assist in the healing of an infected DLES.    Labs:    Chemistry        Component Value Date/Time     11/03/2022 1325    K 4.7 11/03/2022 1325     11/03/2022 1325    CO2 25 11/03/2022 1325    BUN 10 11/03/2022 1325    CREATININE 1.4 11/03/2022 1325    GLU 78 11/03/2022 1325        Component Value Date/Time    CALCIUM 9.0 11/03/2022 1325    ALKPHOS 80 11/03/2022 1325    AST 22 11/03/2022 1325    ALT 14 11/03/2022 1325    BILITOT 0.3 11/03/2022 1325    ESTGFRAFRICA 37.6 (A) 07/31/2022 2027    EGFRNONAA 32.5 (A) 07/31/2022 2027            Magnesium   Date Value Ref Range Status   11/03/2022 2.2 1.6 - 2.6 mg/dL Final       Lab Results   Component Value Date    WBC 5.10 11/03/2022    HGB 8.7 (L) 11/03/2022    HCT 30.4 (L) 11/03/2022    MCV 91 11/03/2022     11/03/2022       Lab Results   Component Value Date    INR 2.4 (H) 11/03/2022    INR 2.4 (H) 10/31/2022    INR 2.5 (H) 10/27/2022       BNP   Date Value Ref Range Status   11/03/2022 599 (H) 0 - 99 pg/mL Final     Comment:     Values of less than 100 pg/ml are consistent with non-CHF populations.   10/05/2022 201 (H) 0 - 99 pg/mL Final     Comment:     Values of less than 100 pg/ml are consistent with non-CHF populations.   10/03/2022 291 (H) 0 - 99 pg/mL Final     Comment:     Values of less than 100 pg/ml are consistent  with non-CHF populations.       LD   Date Value Ref Range Status   11/03/2022 323 (H) 110 - 260 U/L Final     Comment:     Results are increased in hemolyzed samples.   10/05/2022 241 110 - 260 U/L Final     Comment:     Results are increased in hemolyzed samples.   10/04/2022 232 110 - 260 U/L Final     Comment:     Results are increased in hemolyzed samples.       Labs reviewed with patient: YES     Medication reconciliation: per MA.  Medication Detail updated today: yes  Coumadin Managed by: [facility]    Education: Reviewed driveline care, emergency procedures, how to change the controller, alarms with patient, as well as discussed how to page the VAD coordinator in case of an emergency.   Covid - 19 education: Reminded patient/caregiver to check temperature daily and call us if it is > 99.0.  Reminded them  to stay 6 feet away from other people, wash hands frequently, don't touch your face and stay home except to get labs, medications, and appts.    Covid Vaccine: Pt informed that we are encouraging all VAD patients to receive the COVID vaccine.  Informed pt that they can take tylenol but should avoid other NSAIDs.      Plans/Needs: Dr. Bautista would like pt to RTC in 2 weeks with echo because of speed drops and PI events.  No changes made.  Refer to MD note.  Pt seeing vascular surgery today after clinic visit     Hurricane Season: No

## 2022-11-03 NOTE — LETTER
November 3, 2022        Diego Daniel MD  [address] [place] [address]             [facility]  [address]  [place] [address]  Phone: [phone]  Fax: [phone]   Patient: Tim Richards   MR Number: [id number]   YOB: 1965   Date of Visit: 11/3/2022       Dear Dr. Daniel:    Thank you for referring Tim Richards to me for evaluation. Below are the relevant portions of my assessment and plan of care.       56 y.o. male with a h/o infected right common femoral artery pseudoaneurysm s/p resection of R common femoral artery, creation of Right Iliofemoral bypass (end to end)  distal External Iliac to distal femoral artery, and explant of infected dacron femoral conduit with sartorius and gracilis muscle flap creation (plastics).  Followed by ID for abx recommendations.      1) continue rehab  2) R groin wound healing - no defect, VAC can be dc'd - topical with nonadherent   3) RTC in 3 mo with SADAF and R fem graft duplex    Zach Hernández MD  Vascular & Endovascular Surgery         If you have questions, please do not hesitate to call me. I look forward to following Tim along with you.    Sincerely,      Zach Hernández MD           CC    No Recipients

## 2022-11-03 NOTE — LETTER
November 3, 2022        Nader Chiu  [address]  [address]  [place] [address]  Phone: [phone]  Fax: [phone]     Nader Chiu  [address]  [address]  [place] [address]  Phone: [phone]  Fax: [phone]             [id number]  [address]  [place] [address]  Phone: [phone]   Patient: Tim Richards   MR Number: [id number]   YOB: 1965   Date of Visit: 11/3/2022       Dear Dr. Nader Chiu, Nader Chiu    Thank you for referring Tim Richards to me for evaluation. Attached you will find relevant portions of my assessment and plan of care.    If you have questions, please do not hesitate to call me. I look forward to following Tim Richards along with you.    Sincerely,    Guerda Bautista MD    Enclosure    If you would like to receive this communication electronically, please contact [email or web address] or [phone] to request [employer] access.    [employer] is a tool which provides read-only access to select patient information with whom you have a relationship. Its easy to use and provides real time access to review your patients record including encounter summaries, notes, results, and demographic information.    If you feel you have received this communication in error or would no longer like to receive these types of communications, please e-mail [email or web address]

## 2022-11-03 NOTE — PROGRESS NOTES
Patient forgot to bring in his MPU and UBC even after stating he would. Informed patient to bring it in the next time he comes in. Patient agreed and verbalized understanding.

## 2022-11-03 NOTE — PROGRESS NOTES
Tim Somers Maurice  11/03/2022    HPI:  Patient is a 56 y.o. male with a h/o alcohol and tobacco abuse and CHF with a LVAD.  He was recently hospitalized 06/24/22-09/04/22 for cardiogenic shock requiring VA-ECMO.  On 09/22/22 he was found to have purulence of the groin cannulation site and was placed on antibiotics. On 09/24/22 he sat with his legs cross for a prolonged period and when he uncrossed his legs he noticed he was in a pool of blood originating from his ECMO cannulation site.  He returned to the [facility] ED that day and on 09/27 underwent Resection of R common femoral artery, Creation of Right Iliofemoral bypass (end to end)  istal External Iliac to distal femoral artery, Explant of infected dacron femoral conduit with sartorius and rectus muscle flap creation.  He presents today for post-operative evaluation. He primarily complains of right leg weakness and fatigue, however he denies symptoms of claudication or rest pain.   No MI/stroke  Tobacco use: 1pack/wk for about 35 years    Past Medical History:   Diagnosis Date    A-fib     Anticoagulant long-term use     Atrial flutter 7/30/2022    CHF (congestive heart failure)     Class 1 obesity due to excess calories with serious comorbidity and body mass index (BMI) of 31.0 to 31.9 in adult     Class 1 obesity due to excess calories with serious comorbidity and body mass index (BMI) of 32.0 to 32.9 in adult     Dilated cardiomyopathy 1/10/2018    Disorder of kidney and ureter     CKD    Encounter for blood transfusion     Essential hypertension 8/28/2022    Gout     HTN (hypertension)     Hx of psychiatric care     ICD (implantable cardioverter-defibrillator) infection 7/1/2020    Psychiatric problem     Thyroid disease     Ventricular tachycardia (paroxysmal)      Past Surgical History:   Procedure Laterality Date    AORTIC VALVULOPLASTY N/A 7/13/2022    Procedure: REPAIR, AORTIC VALVE;  Surgeon: Yg Kaufman MD;  Location: [facility] OR [address] [place] [address];  Service:  Cardiovascular;  Laterality: N/A;    APPLICATION OF WOUND VACUUM-ASSISTED CLOSURE DEVICE N/A 7/15/2022    Procedure: APPLICATION, WOUND VAC;  Surgeon: Yg Kaufman MD;  Location: [facility] OR 2ND FLR;  Service: Cardiovascular;  Laterality: N/A;  50 x 5 cm     APPLICATION OF WOUND VACUUM-ASSISTED CLOSURE DEVICE Right 9/27/2022    Procedure: APPLICATION, WOUND VAC;  Surgeon: Kole Tabares MD;  [facility];  Service: Plastics;  Laterality: Right;    CARDIAC CATHETERIZATION  Dec. 2012    CARDIAC DEFIBRILLATOR PLACEMENT Left     CRRT-D    COLONOSCOPY N/A 3/6/2018    Procedure: COLONOSCOPY;  Surgeon: Alonso Bone MD;  Location: [facility] ENDO (2ND FLR);  Service: Endoscopy;  Laterality: N/A;    COLONOSCOPY N/A 7/17/2019    Procedure: COLONOSCOPY;  Surgeon: Blane Valdez MD;  Location: [facility] ENDO (2ND FLR);  Service: Endoscopy;  Laterality: N/A;    COLONOSCOPY N/A 7/18/2019    Procedure: COLONOSCOPY;  Surgeon: Blane Valdez MD;  Location: [facility] ENDO (2ND FLR);  Service: Endoscopy;  Laterality: N/A;    CREATION OF ILIOFEMORAL ARTERY BYPASS Right 9/27/2022    Procedure: CREATION, BYPASS, ARTERIAL, ILIAC TO FEMORAL WITH GRAFT;  Surgeon: Zach Hernández MD;  [facility];  Service: Peripheral Vascular;  Laterality: Right;    CREATION OF MUSCLE ROTATIONAL FLAP Right 9/27/2022    Procedure: CREATION, FLAP, MUSCLE ROTATION, SARTORIUS AND RECTUS FEMORIS;  Surgeon: Kole Tabares MD;  Location: [facility] OR 2ND FLR;  Service: Plastics;  Laterality: Right;    ESOPHAGOGASTRODUODENOSCOPY N/A 7/17/2019    Procedure: EGD (ESOPHAGOGASTRODUODENOSCOPY);  Surgeon: Blane Valdez MD;  Location: [facility] ENDO (2ND FLR);  Service: Endoscopy;  Laterality: N/A;    ESOPHAGOGASTRODUODENOSCOPY N/A 7/18/2019    Procedure: EGD (ESOPHAGOGASTRODUODENOSCOPY);  Surgeon: Blane Valdez MD;  Location: [facility] ENDO (2ND FLR);  Service: Endoscopy;  Laterality: N/A;    FOREIGN BODY REMOVAL N/A 7/22/2022    Procedure: REMOVAL, FOREIGN BODY;   Surgeon: Yg Kaufman MD;  [facility];  Service: Cardiovascular;  Laterality: N/A;  LVAD Heartmate 2 drive line removal    INSERTION OF GRAFT TO PERICARDIUM N/A 7/15/2022    Procedure: INSERTION, GRAFT, PERICARDIUM;  Surgeon: Yg Kaufman MD;  [facility];  Service: Cardiovascular;  Laterality: N/A;    IRRIGATION OF MEDIASTINUM N/A 7/15/2022    Procedure: IRRIGATION, MEDIASTINUM;  Surgeon: Yg Kaufman MD;  Location: [facility] OR [facility] FLR;  Service: Cardiovascular;  Laterality: N/A;    LYSIS OF ADHESIONS  7/13/2022    Procedure: LYSIS, ADHESIONS;  Surgeon: Yg Kaufman MD;  [facility];  Service: Cardiovascular;;    NONINVASIVE CARDIAC ELECTROPHYSIOLOGY STUDY N/A 10/18/2019    Procedure: CARDIAC ELECTROPHYSIOLOGY STUDY, NONINVASIVE;  Surgeon: Raz Wagner MD;  Location: [facility] EP LAB;  Service: Cardiology;  Laterality: N/A;  VT, DFTs, MDT CRTD in situ, LVAD, LASHELL pizarro, 3098    REPLACEMENT OF IMPLANTABLE CARDIOVERTER-DEFIBRILLATOR (ICD) GENERATOR N/A 3/9/2020    Procedure: REPLACEMENT, ICD GENERATOR;  Surgeon: Harry Yun MD;  Location: [facility] EP LAB;  Service: Cardiology;  Laterality: N/A;  VT, ICD Gen Change and Lead Revision, MDT, MAC, DM,3 Prep    REPLACEMENT OF LEFT VENTRICULAR ASSIST DEVICE (LVAD)  7/13/2022    Procedure: REPLACEMENT, LVAD;  Surgeon: Yg Kaufman MD;  [facility];  Service: Cardiovascular;;    REPLACEMENT OF PUMP N/A 7/13/2022    Procedure: REPLACEMENT, PUMP;  Surgeon: Yg Kaufman MD;  [facility];  Service: Cardiovascular;  Laterality: N/A;  LVAD pump exchange  EXPLANATION OF HEATMATE 2  IMPLANTATION OF HEARTMATE 3  IMPLANTATION OF 8MM CHIMNEY GRAFT TO RFA  INITIATION OF ECMO  TEMPORARY CLOSURE OF CHEST    REVISION OF IMPLANTABLE CARDIOVERTER-DEFIBRILLATOR (ICD) ELECTRODE LEAD PLACEMENT N/A 3/9/2020    Procedure: REVISION, INSERTION, ELECTRODE LEAD, ICD;  Surgeon: Harry Yun MD;  Location: [facility] EP LAB;   Service: Cardiology;  Laterality: N/A;  VT, ICD Gen Change and Lead Revision, MDT, MAC, DM,3 Prep    STERNAL WOUND CLOSURE N/A 2022    Procedure: CLOSURE, WOUND, STERNUM;  Surgeon: Yg Kaufman MD;  [facility];  Service: Cardiovascular;  Laterality: N/A;  temporary closure  evacuation of hematoma    STERNAL WOUND CLOSURE N/A 7/15/2022    Procedure: CLOSURE, WOUND, STERNUM;  Surgeon: Yg Kaufman MD;  [facility];  Service: Cardiovascular;  Laterality: N/A;    TRACHEOSTOMY N/A 2022    Procedure: CREATION, TRACHEOSTOMY;  Surgeon: Germain Holt MD;  Location: [facility] OR [address] [place] [address];  Service: General;  Laterality: N/A;    TREATMENT OF CARDIAC ARRHYTHMIA  10/18/2019    Procedure: Cardioversion or Defibrillation;  Surgeon: Raz Wagner MD;  Location: [facility] EP LAB;  Service: Cardiology;;     Family History   Problem Relation Age of Onset    Hypertension Father     Diabetes Father     Coronary artery disease Father     Heart disease Father         CHF    No Known Problems Mother     Cancer Sister 54        breast CA    No Known Problems Brother     Anxiety disorder Neg Hx     Depression Neg Hx     Dementia Neg Hx     Bipolar disorder Neg Hx     Suicide Neg Hx      Social History     Socioeconomic History    Marital status:     Number of children: 3   Occupational History     Employer: [employer]    Tobacco Use    Smoking status: Former     Packs/day: 1.00     Years: 31.00     Pack years: 31.00     Types: Cigarettes     Quit date: 2018     Years since quittin.8    Smokeless tobacco: Never    Tobacco comments:     pt is quiting on his own - pt stated not qualified for program;  pt  quit on his own   Substance and Sexual Activity    Alcohol use: No     Alcohol/week: 0.0 standard drinks     Comment: quit    Drug use: No    Sexual activity: Yes     Partners: Female     Birth control/protection: None     Comment: 10/5/17  with same partner 7 years     Social History Narrative    Disabled     Social Determinants of Health     Financial Resource Strain: Low Risk     Difficulty of Paying Living Expenses: Not very hard   Food Insecurity: Unknown    Worried About Running Out of Food in the Last Year: Patient refused    Ran Out of Food in the Last Year: Patient refused   Transportation Needs: Unknown    Lack of Transportation (Medical): Patient refused    Lack of Transportation (Non-Medical): Patient refused   Physical Activity: Insufficiently Active    Days of Exercise per Week: 3 days    Minutes of Exercise per Session: 10 min   Stress: Stress Concern Present    Feeling of Stress : To some extent   Social Connections: Unknown    Frequency of Communication with Friends and Family: Patient refused    Frequency of Social Gatherings with Friends and Family: Patient refused    Active Member of Clubs or Organizations: No    Attends Club or Organization Meetings: Patient refused    Marital Status:    Housing Stability: Unknown    Unable to Pay for Housing in the Last Year: Patient refused    Unstable Housing in the Last Year: Patient refused       Current Outpatient Medications:     acetaminophen (TYLENOL) 500 MG tablet, Take 2 tablets (1,000 mg total) by mouth 3 (three) times daily as needed for Pain., Disp: , Rfl: 0    ALPRAZolam (XANAX) 1 MG tablet, Take 0.5-1 tablets (0.5-1 mg total) by mouth 2 (two) times daily as needed for Anxiety., Disp: 30 tablet, Rfl: 5    amiodarone (PACERONE) 200 MG Tab, Take 2 tablets (400 mg total) by mouth once daily., Disp: 180 tablet, Rfl: 3    ferrous gluconate (FERGON) 324 MG tablet, Take 1 tablet (324 mg total) by mouth with lunch., Disp: 30 tablet, Rfl: 11    levothyroxine (SYNTHROID) 50 MCG tablet, Take 1 tablet (50 mcg total) by mouth before breakfast., Disp: 90 tablet, Rfl: 3    magnesium oxide (MAG-OX) 400 mg (241.3 mg magnesium) tablet, Take 1 tablet (400 mg total) by mouth 2 (two) times daily., Disp: , Rfl: 0     mexiletine (MEXITIL) 250 MG Cap, Take 1 capsule (250 mg total) by mouth every 8 (eight) hours., Disp: 90 capsule, Rfl: 3    mirtazapine (REMERON) 30 MG tablet, Take 1 tablet (30 mg total) by mouth every evening., Disp: 90 tablet, Rfl: 1    omega-3 acid ethyl esters (LOVAZA) 1 gram capsule, Take 2 capsules (2 g total) by mouth 2 (two) times daily., Disp: 360 capsule, Rfl: 3    pantoprazole (PROTONIX) 40 MG tablet, Take 1 tablet (40 mg total) by mouth daily with lunch., Disp: 90 tablet, Rfl: 3    polyethylene glycol (GLYCOLAX) 17 gram PwPk, Take 17 g by mouth once daily., Disp: , Rfl: 0    sodium chloride 0.9% SolP 100 mL with ertapenem 1 gram SolR 1 g, Inject 1 g into the vein once daily., Disp: , Rfl:     warfarin (COUMADIN) 5 MG tablet, Take a half tablet (2.5mg) by mouth on 10/5 only. Then take 1 tablet (5mg) daily, Disp: 135 tablet, Rfl: 3    REVIEW OF SYSTEMS:  General: negative; ENT: negative; Allergy and Immunology: negative; Hematological and Lymphatic: Negative; Endocrine: negative; Respiratory: no cough, shortness of breath, or wheezing; Cardiovascular: no chest pain or dyspnea on exertion; Gastrointestinal: no abdominal pain/back, change in bowel habits, or bloody stools; Genito-Urinary: no dysuria, trouble voiding, or hematuria; Musculoskeletal: negative  Neurological: no TIA or stroke symptoms; Psychiatric: no nervousness, anxiety or depression.    PHYSICAL EXAM:                General appearance:  Alert, well-appearing, and in no distress.  Oriented to person, place, and time   Neurological: Normal speech, no focal findings noted; CN II - XII grossly intact           Musculoskeletal: Digits/nail without cyanosis/clubbing.  Normal muscle strength/tone.                 Neck: Supple, no significant adenopathy; thyroid is not enlarged                Chest:  Clear to auscultation, no wheezes, rales or rhonchi, symmetric air entry     No use of accessory muscles             Cardiac: Normal rate and regular  rhythm, S1 and S2 normal; PMI non-displaced          Abdomen: Soft, nontender, nondistended, no masses or organomegaly     No rebound tenderness noted; bowel sounds normal     No groin adenopathy      Extremities:   2+ femoral pulses bilaterally     pedal pulses not palpable.     Mild pre-tibial edema     No ulcerations     R groin incision with defect near the middle of incision dressed with foam and tegaderm, healing well. Nylon sutures in place along the rest of the incision    LAB RESULTS:  Lab Results   Component Value Date    K 4.7 11/03/2022    K 3.9 10/31/2022    K 4.5 10/24/2022    CREATININE 1.4 11/03/2022    CREATININE 1.3 10/31/2022    CREATININE 1.1 10/24/2022     Lab Results   Component Value Date    WBC 5.10 11/03/2022    WBC 4.64 10/31/2022    WBC 4.97 10/24/2022    HCT 30.4 (L) 11/03/2022    HCT 27.6 (L) 10/31/2022    HCT 29.2 (L) 10/24/2022     11/03/2022     10/31/2022     10/24/2022     Lab Results   Component Value Date    HGBA1C 5.4 04/26/2021    HGBA1C 5.5 03/08/2018    HGBA1C 5.4 01/23/2018     IMAGING:    Rt PTA BP  116 mmHg   Rt dors pedis A  mmHg   Rt toe BP  88 mmHg   Rt SADAF post tibial (rt post tib A BP / max brach A BP) 1.23   Rt SADAF (rt dors ped A BP / max brach A BP) 1.18   Rt ankle BP / max brach A BP   1.23   Rt TBI (rt toe BP / max brach A BP)    0.94   Lt brachial A syst BP  94 mmHg   Lt PTA BP  102 mmHg   Lt dors pedis A  mmHg   Lt toe BP  101 mmHg   Lt SADAF (lt post tibial A BP / max brach A BP)  1.09   Lt SADAF dors ped (lt dors ped A BP / max brach A BP)    1.13   Lt ankle BP / max brach A BP   1.13   Lt TBI (lt toe BP / max brach A BP)    1.07     PPG Lower   =========     Rt toe BP - PPG    88 mmHg   Rt toe digit waveform: severely dampened   Lt toe BP - PPG    101 mmHg   Lt toe digit waveform: severely dampened     Impression   =========     This was a technically difficult study due to patient having an abnormal cardiac rhythm. The waveforms  are difficult to interpret.   Further testing may be needed.     Right Leg: Segmental Pressures suggest no evidence of peripheral arterial occlusive disease. However, PVR waveforms suggest   moderate peripheral arterial occlusive disease.     Left Leg: Segmental Pressures suggest no evidence of peripheral arterial occlusive disease. However, PVR waveforms suggest   moderate peripheral arterial occlusive disease.     -Bilateral Toe PPG's and Pressures were performed.   Rt lower digits: Toe PPG waveforms as described above. - TBI of 0.94 with a Great toe pressure of 88 mmHg is noted.   Lt lower digits: Toe PPG waveforms as described above. - TBI of 1.07 with a Great toe pressure of 101 mmHg is noted.        IMP/PLAN:     56 y.o. male with a h/o infected right common femoral artery pseudoaneurysm s/p resection of R common femoral artery, creation of Right Iliofemoral bypass (end to end)  distal External Iliac to distal femoral artery, and explant of infected dacron femoral conduit with sartorius and gracilis muscle flap creation (plastics).  Followed by ID for abx recommendations.      1) continue rehab  2) R groin wound healing - no defect, VAC can be dc'd - topical with nonadherent   3) RTC in 3 mo with SADAF and R fem graft duplex    Zach Hernández MD  Vascular & Endovascular Surgery

## 2022-11-03 NOTE — PROGRESS NOTES
Subjective:   Patient ID:  Tim Richards is a 56 y.o. male who presents for LVAD followup visit.    Implant Date: Heartmate II on 3/8/2018 (outflow graft changed 3/9/2018), exchanged to Heartmate 3 on 7/13/2022    Heartmate 3 RPM 6400     INR goal: 2-3 (Pt declined INR meter until changing insurance in 12/2022)  NO Bridge with heparin  Antiplatelets: ASA 81mg    TXP SACHI INTERROGATIONS 11/3/2022   Type HeartMate3   Flow 5.6   Speed 6400   PI 1.8   Power (Jackson) 5.6   LSL 6000   Pulsatility -       HPI  Mr. Tim Richards is a 56 y.o.  male with stage D HFrEF who was previously supported with a HM2 (implanted 3/8/2018 as DT-VAD) who was admitted with COVID-19 PNA and AHRF complicated by VAD pump thrombosis refractory to medical therapy (failed integrillin) and is now status post HM3 pump exchange 7/13/2022. Post-op course was prolonged and complicated by worsening cardiogenic shock/RV failure requiring VA-ECMO that was successfully decannulated and Klebsiella aerogenes pneumonia. He also had episodes of VT with ICD discharge 7/21 requiring amio and NSVT episodes requiring addition of lidocaine gtt. This was transitioned to PO amiodarone and mexiletine. He unfortunately required re-intubation 7/29 for hypercapneic respiratory failure after initial extubation 7/27 and developed Aflutter with RVR with drop in PI and BP requiring DCCV. He ultimately underwent tracheostomy 8/4 and weaned off Epi 8/8. He briefly required TPN. He was transitioned back to enteral feeding and subsequently improved from a swallow standpoint and tolerated regular diet. He was having issues with vertigo-like symptoms with unremarkable ENT workup and negative CT heads. Due to higher prolonged doses of mexiletine during hospitalization, this was reduced to 250 mg TID. Amiodarone was adjusted after note of some CT liver parenchyma attenuation with report of copper/iron/med deposition but due to reduction of mexiletine, this  "was increased to 400 mg daily. The CT had also shown possible signs suggestive of avascular necrosis of the femoral head and discussion with Endocrinology was held to wean steroid dosing for amiodarone-induced hyperthyroidism with recommendations for prednisone 5 mg daily to be continued through 8/31 then discontinued. He remained on methimazole. At the time of discharge, he had a size 8 cuffed Shiley trach with decision to downsize to be left as an outpatient. He was discharged on 9/1/2022. Has done well since. This is his 2nd weekly clinic visit sine hospital discharge. VAD speed is at 6400 rpm. No VAD alarms noted on interrogation occasional PI events. BP is 82 mm of Hg. New DLES is a "1.5" with old DL site looking good.  INR is therapeutic at 2.4. LDh is at baseline.     Review of Systems   Constitutional: Negative. Negative for chills, decreased appetite, diaphoresis, fever, malaise/fatigue, night sweats, weight gain and weight loss.   Eyes: Negative.    Cardiovascular:  Negative for chest pain, claudication, cyanosis, dyspnea on exertion, irregular heartbeat, leg swelling, near-syncope, orthopnea, palpitations, paroxysmal nocturnal dyspnea and syncope.   Respiratory:  Negative for cough, hemoptysis and shortness of breath.    Endocrine: Negative.    Hematologic/Lymphatic: Negative.    Skin:  Negative for color change, dry skin and nail changes.   Musculoskeletal: Negative.    Gastrointestinal: Negative.    Genitourinary: Negative.    Neurological:  Negative for weakness.     Objective:   Blood pressure (!) 82/0, temperature 98.6 °F (37 °C), temperature source Oral, height 6' 1" (1.854 m), weight 94.3 kg (208 lb).body mass index is 27.44 kg/m².    Doppler: 82    Physical Exam  Vitals reviewed.   Constitutional:       Appearance: He is well-developed.      Comments: Ht 6' 1" (1.854 m)   Wt 94.3 kg (208 lb)   BMI 27.44 kg/m²      HENT:      Head: Normocephalic.   Neck:      Vascular: No carotid bruit or JVD. " "  Cardiovascular:      Heart sounds: No murmur heard.     Comments: Smooth VAD hum. DLES is "1.5"  Pulmonary:      Effort: Pulmonary effort is normal.      Breath sounds: Normal breath sounds.   Abdominal:      General: Bowel sounds are normal.      Palpations: Abdomen is soft.   Skin:     General: Skin is warm.   Neurological:      Mental Status: He is alert.       Lab Results   Component Value Date    WBC 4.64 10/31/2022    HGB 8.0 (L) 10/31/2022    HCT 27.6 (L) 10/31/2022    MCV 92 10/31/2022     10/31/2022    CO2 25 10/31/2022    CREATININE 1.3 10/31/2022    CALCIUM 8.3 (L) 10/31/2022    ALBUMIN 2.2 (L) 10/31/2022    AST 32 10/31/2022     (H) 10/05/2022    ALT 16 10/31/2022     10/05/2022       Lab Results   Component Value Date    INR 2.4 (H) 11/03/2022    INR 2.4 (H) 10/31/2022    INR 2.5 (H) 10/27/2022       BNP   Date Value Ref Range Status   10/05/2022 201 (H) 0 - 99 pg/mL Final     Comment:     Values of less than 100 pg/ml are consistent with non-CHF populations.   10/03/2022 291 (H) 0 - 99 pg/mL Final     Comment:     Values of less than 100 pg/ml are consistent with non-CHF populations.   09/30/2022 179 (H) 0 - 99 pg/mL Final     Comment:     Values of less than 100 pg/ml are consistent with non-CHF populations.       LD   Date Value Ref Range Status   10/05/2022 241 110 - 260 U/L Final     Comment:     Results are increased in hemolyzed samples.   10/04/2022 232 110 - 260 U/L Final     Comment:     Results are increased in hemolyzed samples.   10/03/2022 238 110 - 260 U/L Final     Comment:     Results are increased in hemolyzed samples.       Assessment:      1. LVAD (left ventricular assist device) present    2. Chronic combined systolic and diastolic heart failure    3. Dilated cardiomyopathy    4. Left ventricular assist device (LVAD) complication, subsequent encounter    5. CHICHI (obstructive sleep apnea)    6. VF (ventricular fibrillation)    7. Stage 3b chronic kidney disease  "       Plan:   Doing well. Continues to make progress.  BP is controlled.  INR is therapeutic.  VAD interrogation was performed in clinic  Any VAD management kits dispensed today medically necessary  Recommend 2 gram sodium restriction and 1500cc fluid restriction.  He has an appointment with Vascular today  Encourage physical activity with graded exercise program.  Requested patient to weigh themselves daily, and to notify us if their weight increases by more than 3 lbs in 1 day or 5 lbs in 1 week.   Additionally, the patient has a patient centered goal of being able to get stronger  RTC in 1 week  Patient advised that it is recommended that all patients, and their close contacts and household members receive Covid vaccination.    Listed for transplant: No    UNOS Patient Status  Functional Status: 80% - Normal activity with effort: some symptoms of disease  Physical Capacity: No Limitations  Working for Income: No  If no, reason not working: Demands of Treatment    Guerda Bautista MD

## 2022-11-03 NOTE — PROCEDURES
TXP SACHI INTERROGATIONS 11/3/2022 10/5/2022 10/5/2022 10/5/2022 10/4/2022 10/4/2022 10/4/2022   Type HeartMate3 - - - - - -   Flow 5.6 - - - - - -   Speed 6400 - - - - - -   PI 1.8 - - - - - -   Power (Jackson) 5.6 - - - - - -   LSL 6000 - - - - - -   Pulsatility - Intermittent pulse Pulse Pulse Intermittent pulse Intermittent pulse Intermittent pulse   }

## 2022-11-04 ENCOUNTER — TELEPHONE (OUTPATIENT)
Dept: PLASTIC SURGERY | Facility: CLINIC | Age: 57
End: 2022-11-04
Payer: COMMERCIAL

## 2022-11-07 ENCOUNTER — ANTI-COAG VISIT (OUTPATIENT)
Dept: CARDIOLOGY | Facility: CLINIC | Age: 57
End: 2022-11-07
Payer: COMMERCIAL

## 2022-11-07 ENCOUNTER — LAB VISIT (OUTPATIENT)
Dept: LAB | Facility: HOSPITAL | Age: 57
End: 2022-11-07
Attending: INTERNAL MEDICINE
Payer: COMMERCIAL

## 2022-11-07 ENCOUNTER — PATIENT MESSAGE (OUTPATIENT)
Dept: TRANSPLANT | Facility: CLINIC | Age: 57
End: 2022-11-07
Payer: COMMERCIAL

## 2022-11-07 DIAGNOSIS — I13.0 HYPERTENSIVE HEART AND RENAL DISEASE WITH CONGESTIVE HEART FAILURE: Primary | ICD-10-CM

## 2022-11-07 DIAGNOSIS — Z79.01 LONG TERM (CURRENT) USE OF ANTICOAGULANTS: ICD-10-CM

## 2022-11-07 DIAGNOSIS — I50.43 ACUTE ON CHRONIC COMBINED SYSTOLIC AND DIASTOLIC HEART FAILURE: ICD-10-CM

## 2022-11-07 DIAGNOSIS — N18.32 CHRONIC KIDNEY DISEASE (CKD) STAGE G3B/A1, MODERATELY DECREASED GLOMERULAR FILTRATION RATE (GFR) BETWEEN 30-44 ML/MIN/1.73 SQUARE METER AND ALBUMINURIA CREATININE RATIO LESS THAN 30 MG/G: ICD-10-CM

## 2022-11-07 LAB
ALBUMIN SERPL BCP-MCNC: 2.2 G/DL (ref 3.5–5.2)
ALP SERPL-CCNC: 59 U/L (ref 55–135)
ALT SERPL W/O P-5'-P-CCNC: 15 U/L (ref 10–44)
ANION GAP SERPL CALC-SCNC: 5 MMOL/L (ref 8–16)
AST SERPL-CCNC: 29 U/L (ref 10–40)
BASOPHILS # BLD AUTO: 0.01 K/UL (ref 0–0.2)
BASOPHILS NFR BLD: 0.2 % (ref 0–1.9)
BILIRUB SERPL-MCNC: 0.3 MG/DL (ref 0.1–1)
BUN SERPL-MCNC: 13 MG/DL (ref 6–20)
CALCIUM SERPL-MCNC: 8.6 MG/DL (ref 8.7–10.5)
CHLORIDE SERPL-SCNC: 108 MMOL/L (ref 95–110)
CO2 SERPL-SCNC: 26 MMOL/L (ref 23–29)
CREAT SERPL-MCNC: 1.3 MG/DL (ref 0.5–1.4)
DIFFERENTIAL METHOD: ABNORMAL
EOSINOPHIL # BLD AUTO: 0.2 K/UL (ref 0–0.5)
EOSINOPHIL NFR BLD: 4.4 % (ref 0–8)
ERYTHROCYTE [DISTWIDTH] IN BLOOD BY AUTOMATED COUNT: 15.9 % (ref 11.5–14.5)
EST. GFR  (NO RACE VARIABLE): >60 ML/MIN/1.73 M^2
GLUCOSE SERPL-MCNC: 56 MG/DL (ref 70–110)
HCT VFR BLD AUTO: 27.4 % (ref 40–54)
HGB BLD-MCNC: 8 G/DL (ref 14–18)
IMM GRANULOCYTES # BLD AUTO: 0.01 K/UL (ref 0–0.04)
IMM GRANULOCYTES NFR BLD AUTO: 0.2 % (ref 0–0.5)
INR PPP: 2.5 (ref 0.8–1.2)
LYMPHOCYTES # BLD AUTO: 1 K/UL (ref 1–4.8)
LYMPHOCYTES NFR BLD: 21.7 % (ref 18–48)
MCH RBC QN AUTO: 26.4 PG (ref 27–31)
MCHC RBC AUTO-ENTMCNC: 29.2 G/DL (ref 32–36)
MCV RBC AUTO: 90 FL (ref 82–98)
MONOCYTES # BLD AUTO: 0.7 K/UL (ref 0.3–1)
MONOCYTES NFR BLD: 15.8 % (ref 4–15)
NEUTROPHILS # BLD AUTO: 2.6 K/UL (ref 1.8–7.7)
NEUTROPHILS NFR BLD: 57.7 % (ref 38–73)
NRBC BLD-RTO: 0 /100 WBC
PLATELET # BLD AUTO: 300 K/UL (ref 150–450)
PMV BLD AUTO: 9.8 FL (ref 9.2–12.9)
POTASSIUM SERPL-SCNC: 4 MMOL/L (ref 3.5–5.1)
PROT SERPL-MCNC: 6.3 G/DL (ref 6–8.4)
PROTHROMBIN TIME: 25.8 SEC (ref 9–12.5)
RBC # BLD AUTO: 3.03 M/UL (ref 4.6–6.2)
SODIUM SERPL-SCNC: 139 MMOL/L (ref 136–145)
WBC # BLD AUTO: 4.57 K/UL (ref 3.9–12.7)

## 2022-11-07 PROCEDURE — 93793 ANTICOAG MGMT PT WARFARIN: CPT | Mod: S$GLB,,,

## 2022-11-07 PROCEDURE — 85025 COMPLETE CBC W/AUTO DIFF WBC: CPT | Performed by: INTERNAL MEDICINE

## 2022-11-07 PROCEDURE — 93793 PR ANTICOAGULANT MGMT FOR PT TAKING WARFARIN: ICD-10-PCS | Mod: S$GLB,,,

## 2022-11-07 PROCEDURE — 85610 PROTHROMBIN TIME: CPT | Performed by: INTERNAL MEDICINE

## 2022-11-07 PROCEDURE — 80053 COMPREHEN METABOLIC PANEL: CPT | Performed by: INTERNAL MEDICINE

## 2022-11-07 NOTE — PROGRESS NOTES
Labs reviewed, BS remains on lower end, talked with patient and he stated he feels fine but he sometimes does not eat prior to labs. Encouraged patient to eat prior to lab work if possible and to notify us if he starts feeling symptoms. Patient verbalized understanding and agreement.

## 2022-11-09 ENCOUNTER — PATIENT MESSAGE (OUTPATIENT)
Dept: TRANSPLANT | Facility: CLINIC | Age: 57
End: 2022-11-09
Payer: COMMERCIAL

## 2022-11-09 ENCOUNTER — OFFICE VISIT (OUTPATIENT)
Dept: INFECTIOUS DISEASES | Facility: CLINIC | Age: 57
End: 2022-11-09
Payer: COMMERCIAL

## 2022-11-09 VITALS — TEMPERATURE: 99 F | BODY MASS INDEX: 28.41 KG/M2 | WEIGHT: 214.38 LBS | HEIGHT: 73 IN | OXYGEN SATURATION: 95 %

## 2022-11-09 DIAGNOSIS — B44.89: ICD-10-CM

## 2022-11-09 DIAGNOSIS — I72.4 PSEUDOANEURYSM OF RIGHT FEMORAL ARTERY: Primary | ICD-10-CM

## 2022-11-09 PROCEDURE — 99215 PR OFFICE/OUTPT VISIT, EST, LEVL V, 40-54 MIN: ICD-10-PCS | Mod: S$GLB,,, | Performed by: STUDENT IN AN ORGANIZED HEALTH CARE EDUCATION/TRAINING PROGRAM

## 2022-11-09 PROCEDURE — 99215 OFFICE O/P EST HI 40 MIN: CPT | Mod: S$GLB,,, | Performed by: STUDENT IN AN ORGANIZED HEALTH CARE EDUCATION/TRAINING PROGRAM

## 2022-11-09 PROCEDURE — 1160F PR REVIEW ALL MEDS BY PRESCRIBER/CLIN PHARMACIST DOCUMENTED: ICD-10-PCS | Mod: CPTII,S$GLB,, | Performed by: STUDENT IN AN ORGANIZED HEALTH CARE EDUCATION/TRAINING PROGRAM

## 2022-11-09 PROCEDURE — 3008F PR BODY MASS INDEX (BMI) DOCUMENTED: ICD-10-PCS | Mod: CPTII,S$GLB,, | Performed by: STUDENT IN AN ORGANIZED HEALTH CARE EDUCATION/TRAINING PROGRAM

## 2022-11-09 PROCEDURE — 1159F PR MEDICATION LIST DOCUMENTED IN MEDICAL RECORD: ICD-10-PCS | Mod: CPTII,S$GLB,, | Performed by: STUDENT IN AN ORGANIZED HEALTH CARE EDUCATION/TRAINING PROGRAM

## 2022-11-09 PROCEDURE — 1160F RVW MEDS BY RX/DR IN RCRD: CPT | Mod: CPTII,S$GLB,, | Performed by: STUDENT IN AN ORGANIZED HEALTH CARE EDUCATION/TRAINING PROGRAM

## 2022-11-09 PROCEDURE — 99999 PR PBB SHADOW E&M-EST. PATIENT-LVL III: ICD-10-PCS | Mod: PBBFAC,,,

## 2022-11-09 PROCEDURE — 99999 PR PBB SHADOW E&M-EST. PATIENT-LVL III: CPT | Mod: PBBFAC,,,

## 2022-11-09 PROCEDURE — 1159F MED LIST DOCD IN RCRD: CPT | Mod: CPTII,S$GLB,, | Performed by: STUDENT IN AN ORGANIZED HEALTH CARE EDUCATION/TRAINING PROGRAM

## 2022-11-09 PROCEDURE — 3008F BODY MASS INDEX DOCD: CPT | Mod: CPTII,S$GLB,, | Performed by: STUDENT IN AN ORGANIZED HEALTH CARE EDUCATION/TRAINING PROGRAM

## 2022-11-09 RX ORDER — VORICONAZOLE 200 MG/1
200 TABLET, FILM COATED ORAL 2 TIMES DAILY
Qty: 60 TABLET | Refills: 2 | Status: SHIPPED | OUTPATIENT
Start: 2022-11-09 | End: 2023-01-06 | Stop reason: SDUPTHER

## 2022-11-09 NOTE — PROGRESS NOTES
INFECTIOUS DISEASE CLINIC  11/09/2022     Subjective:      Chief Complaint:   Chief Complaint   Patient presents with    Follow-up       History of Present Illness:    This is a 56 y.o. 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  Patient known to ID, please see prior notes for full details. Since discharge from the hospital pt states he has been doing ok. Missed/canceled a few ID follow up appts due to transportation issues. Saw vasc surgery recently with wound vac removed. Pt reports tolerating abx without issues and denies problems with PICC. Denies fevers or chills, though states he is seeing some drainage from his prior R groin surgical site.         Review of Systems   Constitutional: Negative for chills and fever.   Skin:  Positive for poor wound healing.   All other systems reviewed and are negative.      Past Medical History:   Diagnosis Date    A-fib     Anticoagulant long-term use     Atrial flutter 7/30/2022    CHF (congestive heart failure)     Class 1 obesity due to excess calories with serious comorbidity and body mass index (BMI) of 31.0 to 31.9 in adult     Class 1 obesity due to excess calories with serious comorbidity and body mass index (BMI) of 32.0 to 32.9 in adult     Dilated cardiomyopathy 1/10/2018    Disorder of kidney and ureter     CKD    Encounter for blood transfusion     Essential hypertension 8/28/2022    Gout     HTN (hypertension)     Hx of psychiatric care     ICD (implantable cardioverter-defibrillator) infection 7/1/2020    Psychiatric problem     Thyroid disease     Ventricular tachycardia (paroxysmal)      Past Surgical History:   Procedure Laterality  Date    AORTIC VALVULOPLASTY N/A 7/13/2022    Procedure: REPAIR, AORTIC VALVE;  Surgeon: Yg Kaufman MD;  Location: [facility] OR 2ND FLR;  Service: Cardiovascular;  Laterality: N/A;    APPLICATION OF WOUND VACUUM-ASSISTED CLOSURE DEVICE N/A 7/15/2022    Procedure: APPLICATION, WOUND VAC;  Surgeon: Yg Kaufman MD;  Location: [facility] OR 2ND FLR;  Service: Cardiovascular;  Laterality: N/A;  50 x 5 cm     APPLICATION OF WOUND VACUUM-ASSISTED CLOSURE DEVICE Right 9/27/2022    Procedure: APPLICATION, WOUND VAC;  Surgeon: Kole Tabares MD;  Location: [facility] OR 2ND FLR;  Service: Plastics;  Laterality: Right;    CARDIAC CATHETERIZATION  Dec. 2012    CARDIAC DEFIBRILLATOR PLACEMENT Left     CRRT-D    COLONOSCOPY N/A 3/6/2018    Procedure: COLONOSCOPY;  Surgeon: Alonso Bone MD;  Location: [facility] ENDO (2ND FLR);  Service: Endoscopy;  Laterality: N/A;    COLONOSCOPY N/A 7/17/2019    Procedure: COLONOSCOPY;  Surgeon: Blane Valdez MD;  Location: [facility] ENDO (2ND FLR);  Service: Endoscopy;  Laterality: N/A;    COLONOSCOPY N/A 7/18/2019    Procedure: COLONOSCOPY;  Surgeon: Blane Valdez MD;  Location: [facility] ENDO (2ND FLR);  Service: Endoscopy;  Laterality: N/A;    CREATION OF ILIOFEMORAL ARTERY BYPASS Right 9/27/2022    Procedure: CREATION, BYPASS, ARTERIAL, ILIAC TO FEMORAL WITH GRAFT;  Surgeon: Zach Hernández MD;  [facility];  Service: Peripheral Vascular;  Laterality: Right;    CREATION OF MUSCLE ROTATIONAL FLAP Right 9/27/2022    Procedure: CREATION, FLAP, MUSCLE ROTATION, SARTORIUS AND RECTUS FEMORIS;  Surgeon: Kole Tabares MD;  Location: [facility] OR 2ND FLR;  Service: Plastics;  Laterality: Right;    ESOPHAGOGASTRODUODENOSCOPY N/A 7/17/2019    Procedure: EGD (ESOPHAGOGASTRODUODENOSCOPY);  Surgeon: Blane Valdez MD;  Location: [facility] ENDO (2ND FLR);  Service: Endoscopy;  Laterality: N/A;    ESOPHAGOGASTRODUODENOSCOPY N/A 7/18/2019    Procedure: EGD (ESOPHAGOGASTRODUODENOSCOPY);  Surgeon: Blane Valdez MD;   Location: [facility] ENDO (2ND FLR);  Service: Endoscopy;  Laterality: N/A;    FOREIGN BODY REMOVAL N/A 7/22/2022    Procedure: REMOVAL, FOREIGN BODY;  Surgeon: Yg Kaufman MD;  Location: [facility] OR [facility] FLR;  Service: Cardiovascular;  Laterality: N/A;  LVAD Heartmate 2 drive line removal    INSERTION OF GRAFT TO PERICARDIUM N/A 7/15/2022    Procedure: INSERTION, GRAFT, PERICARDIUM;  Surgeon: Yg Kaufman MD;  Location: [facility] OR [facility] FLR;  Service: Cardiovascular;  Laterality: N/A;    IRRIGATION OF MEDIASTINUM N/A 7/15/2022    Procedure: IRRIGATION, MEDIASTINUM;  Surgeon: Yg Kaufman MD;  [facility];  Service: Cardiovascular;  Laterality: N/A;    LYSIS OF ADHESIONS  7/13/2022    Procedure: LYSIS, ADHESIONS;  Surgeon: Yg Kaufman MD;  [facility];  Service: Cardiovascular;;    NONINVASIVE CARDIAC ELECTROPHYSIOLOGY STUDY N/A 10/18/2019    Procedure: CARDIAC ELECTROPHYSIOLOGY STUDY, NONINVASIVE;  Surgeon: Raz Wagner MD;  Location: [facility] EP LAB;  Service: Cardiology;  Laterality: N/A;  VT, DFTs, MDT CRTD in situ, LVAD, juarez MB, 3098    REPLACEMENT OF IMPLANTABLE CARDIOVERTER-DEFIBRILLATOR (ICD) GENERATOR N/A 3/9/2020    Procedure: REPLACEMENT, ICD GENERATOR;  Surgeon: Harry Yun MD;  Location: [facility] EP LAB;  Service: Cardiology;  Laterality: N/A;  VT, ICD Gen Change and Lead Revision, MDT, MAC, DM,3 Prep    REPLACEMENT OF LEFT VENTRICULAR ASSIST DEVICE (LVAD)  7/13/2022    Procedure: REPLACEMENT, LVAD;  Surgeon: Yg Kaufman MD;  [facility];  Service: Cardiovascular;;    REPLACEMENT OF PUMP N/A 7/13/2022    Procedure: REPLACEMENT, PUMP;  Surgeon: Yg Kaufman MD;  [facility];  Service: Cardiovascular;  Laterality: N/A;  LVAD pump exchange  EXPLANATION OF HEATMATE 2  IMPLANTATION OF HEARTMATE 3  IMPLANTATION OF 8MM CHIMNEY GRAFT TO RFA  INITIATION OF ECMO  TEMPORARY CLOSURE OF CHEST    REVISION OF IMPLANTABLE CARDIOVERTER-DEFIBRILLATOR (ICD)  ELECTRODE LEAD PLACEMENT N/A 3/9/2020    Procedure: REVISION, INSERTION, ELECTRODE LEAD, ICD;  Surgeon: Harry Yun MD;  Location: [facility] EP LAB;  Service: Cardiology;  Laterality: N/A;  VT, ICD Gen Change and Lead Revision, MDT, MAC, DM,3 Prep    STERNAL WOUND CLOSURE N/A 2022    Procedure: CLOSURE, WOUND, STERNUM;  Surgeon: Yg Kaufman MD;  Location: [facility] OR [facility] FLR;  Service: Cardiovascular;  Laterality: N/A;  temporary closure  evacuation of hematoma    STERNAL WOUND CLOSURE N/A 7/15/2022    Procedure: CLOSURE, WOUND, STERNUM;  Surgeon: Yg Kaumfan MD;  [facility]  Service: Cardiovascular;  Laterality: N/A;    TRACHEOSTOMY N/A 2022    Procedure: CREATION, TRACHEOSTOMY;  Surgeon: Germain Holt MD;  [facility];  Service: General;  Laterality: N/A;    TREATMENT OF CARDIAC ARRHYTHMIA  10/18/2019    Procedure: Cardioversion or Defibrillation;  Surgeon: Raz Wagner MD;  Location: [facility] EP LAB;  Service: Cardiology;;     Family History   Problem Relation Age of Onset    Hypertension Father     Diabetes Father     Coronary artery disease Father     Heart disease Father         CHF    No Known Problems Mother     Cancer Sister 54        breast CA    No Known Problems Brother     Anxiety disorder Neg Hx     Depression Neg Hx     Dementia Neg Hx     Bipolar disorder Neg Hx     Suicide Neg Hx      Social History     Tobacco Use    Smoking status: Former     Packs/day: 1.00     Years: 31.00     Pack years: 31.00     Types: Cigarettes     Quit date: 2018     Years since quittin.8    Smokeless tobacco: Former    Tobacco comments:     pt is quiting on his own - pt stated not qualified for program;  pt  quit on his own   Substance Use Topics    Alcohol use: No     Alcohol/week: 0.0 standard drinks     Comment: quit    Drug use: No       Review of patient's allergies indicates:   Allergen Reactions    Lisinopril Anaphylaxis    Hydralazine analogues       Chronic constipation, impotence, dizziness         Objective:   VS (24h):   Vitals:    11/09/22 1108   Temp: 98.9 °F (37.2 °C)         Physical Exam  Constitutional:       General: He is not in acute distress.     Appearance: He is not ill-appearing or toxic-appearing.   HENT:      Head: Normocephalic and atraumatic.      Right Ear: External ear normal.      Left Ear: External ear normal.   Eyes:      General: No scleral icterus.        Right eye: No discharge.         Left eye: No discharge.   Neck:      Comments: Prior trach site well healed  Cardiovascular:      Comments: defib  Pulmonary:      Effort: Pulmonary effort is normal. No respiratory distress.   Abdominal:      General: There is no distension.      Palpations: Abdomen is soft.      Tenderness: There is no abdominal tenderness. There is no guarding.      Comments: LVAD site dressed   Musculoskeletal:      Right lower leg: No edema.      Left lower leg: No edema.   Skin:     General: Skin is warm and dry.      Comments: R groin wound - fibrinous yellow discharge present; no surrounding erythema  R PICC   Neurological:      Mental Status: He is alert and oriented to person, place, and time. Mental status is at baseline.      Motor: No weakness.   Psychiatric:         Mood and Affect: Mood normal.         Behavior: Behavior normal.         Immunization History   Administered Date(s) Administered    COVID-19, MRNA, LN-S, PF (MODERNA FULL 0.5 ML DOSE) 03/12/2021, 04/09/2021    COVID-19, MRNA, LN-S, PF (Pfizer) (Purple Cap) 12/04/2021    Hepatitis A / Hepatitis B 02/23/2018    Influenza 11/01/2014, 08/17/2019    Influenza - Quadrivalent - PF *Preferred* (6 months and older) 09/23/2015, 09/21/2016, 10/05/2017, 03/05/2021    Influenza - Trivalent - PF (PED) 11/01/2014    Pneumococcal Conjugate - 13 Valent 11/01/2014    Pneumococcal Polysaccharide - 23 Valent 10/01/2015, 03/24/2016    Tdap 02/23/2018         Assessment:     1. Pseudoaneurysm of right femoral  artery    2. 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       57 yo male with complex medical history including HM3 2018 with prior prolonged hospital stay that was notable for hypoxic respiratory failure 2/2 to COVID, cardiogenic shock with AV repair with redo sternotomy, explant of prior LVAD, VA ECMO (decannulated 7/19/22) with take back for mediastinal washout/hematoma, kleb aerogenes vap s/p treatment with recent admission for drainage/bleeding around prior R ecmo cannula site, found to have infected pseudoaneurysm/mycotic aneurysm (superficial wound cx with ESBL Ecoli) s/p R groin exploration with explant of infected graft, creation of ileofem bypass with rif soaked dacron graft/muscle flap (OR cx with ESBL Ecoli, Citrobacter farmeri, and PM) on 6w of erta, with new growth of aspergillus flavus post-discharge from his surgical cultures. Discussed new micro findings and lab work with patient. He denies any fevers or chills. Reports some mild continued drainage from R groin.     Plan:     -Extend ertapenem x 2 week (ina 11/23) given drainage seen today in clinic   -notified infusion company of plan above  -start voriconazole (load 400 mg BID for 2 doses, followed by maintenance 200 mg BID); anticipate prolonged course of therapy given new graft placement   -discussed potential adverse reactions of vori with patient and discussed DDI (amio/warf)  -continue weekly lab work while on IV abx - fax to ID clinic   -follow up with plastics pending      These recommendations have been sent to and/or discussed with the following providers:   - Coumadin pharmacy/VAD coordinator  - Vascular surgery    Follow up in 2 weeks    Management of infected pseudoaneurysm was discussed with patient. Patient was given ample time for questions, all questions answered. Strict return precautions given to patient.       60 minutes of total time spent on the encounter, which includes face to face time and non-face to face time preparing to see  the patient (eg, review of tests), Obtaining and/or reviewing separately obtained history, documenting clinical information in the electronic or other health record, independently interpreting results (not separately reported) and communicating results to the patient/family/caregiver, or care coordination (not separately reported).           Jessica Perez MD  Infectious Disease

## 2022-11-10 ENCOUNTER — TELEPHONE (OUTPATIENT)
Dept: TRANSPLANT | Facility: CLINIC | Age: 57
End: 2022-11-10
Payer: COMMERCIAL

## 2022-11-10 NOTE — TELEPHONE ENCOUNTER
Spoke with Spenser Monsalve RN, regarding right groin wound care s/p wound vac D/C. Faxed over vascular surgery note from 11/3.

## 2022-11-11 ENCOUNTER — ANTI-COAG VISIT (OUTPATIENT)
Dept: CARDIOLOGY | Facility: CLINIC | Age: 57
End: 2022-11-11
Payer: COMMERCIAL

## 2022-11-11 ENCOUNTER — DOCUMENT SCAN (OUTPATIENT)
Dept: HOME HEALTH SERVICES | Facility: HOSPITAL | Age: 57
End: 2022-11-11
Payer: COMMERCIAL

## 2022-11-11 LAB — INR PPP: 2.8

## 2022-11-11 PROCEDURE — 93793 PR ANTICOAGULANT MGMT FOR PT TAKING WARFARIN: ICD-10-PCS | Mod: S$GLB,,,

## 2022-11-11 PROCEDURE — 93793 ANTICOAG MGMT PT WARFARIN: CPT | Mod: S$GLB,,,

## 2022-11-11 NOTE — PROGRESS NOTES
INR at goal. Medications and chart reviewed. Started voriconazole this week - needs close monitoring. See calendar.

## 2022-11-11 NOTE — PROGRESS NOTES
Gricel from [facility] called giving verbal read as INR 2.8 by venipuncture, dated for 11/11/22, stated she will fax hard copy.

## 2022-11-11 NOTE — PROGRESS NOTES
I spoke to Kelly/wife regarding patient now wanting INR meter but the issue is on 12/1/22 he will be changing from [employer] to Medicare insurance so can't do anything now to process for meter because with insurance change will have to re-submit any documents/work.  I instructed her once Medicare insurance is obtained that she must update that insurance information to patient's [facility] account then she must contact the Coumadin Clinic so at that time we can process him for meter which she verbalized understanding.

## 2022-11-15 ENCOUNTER — EXTERNAL HOME HEALTH (OUTPATIENT)
Dept: HOME HEALTH SERVICES | Facility: HOSPITAL | Age: 57
End: 2022-11-15
Payer: COMMERCIAL

## 2022-11-15 ENCOUNTER — ANTI-COAG VISIT (OUTPATIENT)
Dept: CARDIOLOGY | Facility: CLINIC | Age: 57
End: 2022-11-15
Payer: COMMERCIAL

## 2022-11-15 ENCOUNTER — LAB VISIT (OUTPATIENT)
Dept: LAB | Facility: HOSPITAL | Age: 57
End: 2022-11-15
Attending: INTERNAL MEDICINE
Payer: COMMERCIAL

## 2022-11-15 DIAGNOSIS — N18.32 CHRONIC KIDNEY DISEASE (CKD) STAGE G3B/A1, MODERATELY DECREASED GLOMERULAR FILTRATION RATE (GFR) BETWEEN 30-44 ML/MIN/1.73 SQUARE METER AND ALBUMINURIA CREATININE RATIO LESS THAN 30 MG/G: ICD-10-CM

## 2022-11-15 DIAGNOSIS — I50.43 ACUTE ON CHRONIC COMBINED SYSTOLIC AND DIASTOLIC HEART FAILURE: ICD-10-CM

## 2022-11-15 DIAGNOSIS — I13.0 HYPERTENSIVE HEART AND RENAL DISEASE WITH CONGESTIVE HEART FAILURE: Primary | ICD-10-CM

## 2022-11-15 LAB
ACID FAST MOD KINY STN SPEC: NORMAL
ALBUMIN SERPL BCP-MCNC: 2.3 G/DL (ref 3.5–5.2)
ALP SERPL-CCNC: 65 U/L (ref 55–135)
ALT SERPL W/O P-5'-P-CCNC: 15 U/L (ref 10–44)
ANION GAP SERPL CALC-SCNC: 8 MMOL/L (ref 8–16)
AST SERPL-CCNC: 20 U/L (ref 10–40)
BASOPHILS # BLD AUTO: 0.01 K/UL (ref 0–0.2)
BASOPHILS NFR BLD: 0.2 % (ref 0–1.9)
BILIRUB SERPL-MCNC: 0.3 MG/DL (ref 0.1–1)
BUN SERPL-MCNC: 12 MG/DL (ref 6–20)
CALCIUM SERPL-MCNC: 8.3 MG/DL (ref 8.7–10.5)
CHLORIDE SERPL-SCNC: 109 MMOL/L (ref 95–110)
CO2 SERPL-SCNC: 27 MMOL/L (ref 23–29)
CREAT SERPL-MCNC: 1.2 MG/DL (ref 0.5–1.4)
DIFFERENTIAL METHOD: ABNORMAL
EOSINOPHIL # BLD AUTO: 0.2 K/UL (ref 0–0.5)
EOSINOPHIL NFR BLD: 3.7 % (ref 0–8)
ERYTHROCYTE [DISTWIDTH] IN BLOOD BY AUTOMATED COUNT: 17.1 % (ref 11.5–14.5)
EST. GFR  (NO RACE VARIABLE): >60 ML/MIN/1.73 M^2
GLUCOSE SERPL-MCNC: 81 MG/DL (ref 70–110)
HCT VFR BLD AUTO: 26.9 % (ref 40–54)
HGB BLD-MCNC: 7.8 G/DL (ref 14–18)
IMM GRANULOCYTES # BLD AUTO: 0.01 K/UL (ref 0–0.04)
IMM GRANULOCYTES NFR BLD AUTO: 0.2 % (ref 0–0.5)
INR PPP: 2.3 (ref 0.8–1.2)
LYMPHOCYTES # BLD AUTO: 0.9 K/UL (ref 1–4.8)
LYMPHOCYTES NFR BLD: 18.1 % (ref 18–48)
MCH RBC QN AUTO: 26.3 PG (ref 27–31)
MCHC RBC AUTO-ENTMCNC: 29 G/DL (ref 32–36)
MCV RBC AUTO: 91 FL (ref 82–98)
MONOCYTES # BLD AUTO: 0.7 K/UL (ref 0.3–1)
MONOCYTES NFR BLD: 13.8 % (ref 4–15)
MYCOBACTERIUM SPEC QL CULT: NORMAL
NEUTROPHILS # BLD AUTO: 3.3 K/UL (ref 1.8–7.7)
NEUTROPHILS NFR BLD: 64 % (ref 38–73)
NRBC BLD-RTO: 0 /100 WBC
PLATELET # BLD AUTO: 311 K/UL (ref 150–450)
PMV BLD AUTO: 10.3 FL (ref 9.2–12.9)
POTASSIUM SERPL-SCNC: 4.3 MMOL/L (ref 3.5–5.1)
PROT SERPL-MCNC: 5.8 G/DL (ref 6–8.4)
PROTHROMBIN TIME: 23.8 SEC (ref 9–12.5)
RBC # BLD AUTO: 2.97 M/UL (ref 4.6–6.2)
SODIUM SERPL-SCNC: 144 MMOL/L (ref 136–145)
WBC # BLD AUTO: 5.14 K/UL (ref 3.9–12.7)

## 2022-11-15 PROCEDURE — 80053 COMPREHEN METABOLIC PANEL: CPT | Performed by: INTERNAL MEDICINE

## 2022-11-15 PROCEDURE — 85025 COMPLETE CBC W/AUTO DIFF WBC: CPT | Performed by: INTERNAL MEDICINE

## 2022-11-15 PROCEDURE — 93793 ANTICOAG MGMT PT WARFARIN: CPT | Mod: S$GLB,,,

## 2022-11-15 PROCEDURE — 85610 PROTHROMBIN TIME: CPT | Performed by: INTERNAL MEDICINE

## 2022-11-15 PROCEDURE — 93793 PR ANTICOAGULANT MGMT FOR PT TAKING WARFARIN: ICD-10-PCS | Mod: S$GLB,,,

## 2022-11-16 ENCOUNTER — DOCUMENT SCAN (OUTPATIENT)
Dept: HOME HEALTH SERVICES | Facility: HOSPITAL | Age: 57
End: 2022-11-16
Payer: COMMERCIAL

## 2022-11-16 ENCOUNTER — TELEPHONE (OUTPATIENT)
Dept: TRANSPLANT | Facility: CLINIC | Age: 57
End: 2022-11-16

## 2022-11-16 DIAGNOSIS — Z95.811 LVAD (LEFT VENTRICULAR ASSIST DEVICE) PRESENT: Primary | ICD-10-CM

## 2022-11-16 LAB
ACID FAST MOD KINY STN SPEC: NORMAL
MYCOBACTERIUM SPEC QL CULT: NORMAL

## 2022-11-16 NOTE — TELEPHONE ENCOUNTER
Faxed over lab order for iron studies to [facility] to be drawn on next week along with standing weekly lab orders.

## 2022-11-16 NOTE — PROGRESS NOTES
11/16/22  Gricel with [facility]  called regarding 11/22 INR lab order, she reports Patient is being seen on Monday 11/21 for Infusion visit, inquiring if INR lab draw can be changed to 11/21, date was changed and order ws faxed to HH office

## 2022-11-17 ENCOUNTER — TELEPHONE (OUTPATIENT)
Dept: INFECTIOUS DISEASES | Facility: CLINIC | Age: 57
End: 2022-11-17
Payer: COMMERCIAL

## 2022-11-17 ENCOUNTER — DOCUMENT SCAN (OUTPATIENT)
Dept: HOME HEALTH SERVICES | Facility: HOSPITAL | Age: 57
End: 2022-11-17
Payer: COMMERCIAL

## 2022-11-17 NOTE — TELEPHONE ENCOUNTER
Called [facility], spoke to Noni  She will add the voriconazole level to his weekly labs

## 2022-11-17 NOTE — TELEPHONE ENCOUNTER
----- Message from Jessica Perez MD sent at 11/17/2022  8:29 AM CST -----  Can you fax an order for a voriconazole level to be drawn with pt's HH ([facility])? Thank you

## 2022-11-20 ENCOUNTER — DOCUMENT SCAN (OUTPATIENT)
Dept: HOME HEALTH SERVICES | Facility: HOSPITAL | Age: 57
End: 2022-11-20
Payer: COMMERCIAL

## 2022-11-21 ENCOUNTER — ANTI-COAG VISIT (OUTPATIENT)
Dept: CARDIOLOGY | Facility: CLINIC | Age: 57
End: 2022-11-21
Payer: COMMERCIAL

## 2022-11-21 ENCOUNTER — LAB VISIT (OUTPATIENT)
Dept: LAB | Facility: HOSPITAL | Age: 57
End: 2022-11-21
Attending: INTERNAL MEDICINE
Payer: COMMERCIAL

## 2022-11-21 DIAGNOSIS — I13.0 HYPERTENSIVE HEART AND RENAL DISEASE WITH CONGESTIVE HEART FAILURE: Primary | ICD-10-CM

## 2022-11-21 DIAGNOSIS — I50.43 ACUTE ON CHRONIC COMBINED SYSTOLIC AND DIASTOLIC HEART FAILURE: ICD-10-CM

## 2022-11-21 DIAGNOSIS — Z79.01 LONG TERM (CURRENT) USE OF ANTICOAGULANTS: ICD-10-CM

## 2022-11-21 DIAGNOSIS — N18.32 CHRONIC KIDNEY DISEASE (CKD) STAGE G3B/A1, MODERATELY DECREASED GLOMERULAR FILTRATION RATE (GFR) BETWEEN 30-44 ML/MIN/1.73 SQUARE METER AND ALBUMINURIA CREATININE RATIO LESS THAN 30 MG/G: ICD-10-CM

## 2022-11-21 LAB
ALBUMIN SERPL BCP-MCNC: 2.4 G/DL (ref 3.5–5.2)
ALP SERPL-CCNC: 60 U/L (ref 55–135)
ALT SERPL W/O P-5'-P-CCNC: 12 U/L (ref 10–44)
ANION GAP SERPL CALC-SCNC: 9 MMOL/L (ref 8–16)
AST SERPL-CCNC: 17 U/L (ref 10–40)
BASOPHILS # BLD AUTO: 0.01 K/UL (ref 0–0.2)
BASOPHILS NFR BLD: 0.2 % (ref 0–1.9)
BILIRUB SERPL-MCNC: 0.3 MG/DL (ref 0.1–1)
BUN SERPL-MCNC: 12 MG/DL (ref 6–20)
CALCIUM SERPL-MCNC: 8.6 MG/DL (ref 8.7–10.5)
CHLORIDE SERPL-SCNC: 107 MMOL/L (ref 95–110)
CO2 SERPL-SCNC: 24 MMOL/L (ref 23–29)
CREAT SERPL-MCNC: 1.2 MG/DL (ref 0.5–1.4)
DIFFERENTIAL METHOD: ABNORMAL
EOSINOPHIL # BLD AUTO: 0.2 K/UL (ref 0–0.5)
EOSINOPHIL NFR BLD: 4.7 % (ref 0–8)
ERYTHROCYTE [DISTWIDTH] IN BLOOD BY AUTOMATED COUNT: 16.7 % (ref 11.5–14.5)
EST. GFR  (NO RACE VARIABLE): >60 ML/MIN/1.73 M^2
FERRITIN SERPL-MCNC: 153 NG/ML (ref 20–300)
GLUCOSE SERPL-MCNC: 58 MG/DL (ref 70–110)
HCT VFR BLD AUTO: 27.4 % (ref 40–54)
HGB BLD-MCNC: 8.1 G/DL (ref 14–18)
IMM GRANULOCYTES # BLD AUTO: 0.01 K/UL (ref 0–0.04)
IMM GRANULOCYTES NFR BLD AUTO: 0.2 % (ref 0–0.5)
INR PPP: 3 (ref 0.8–1.2)
IRON SERPL-MCNC: 23 UG/DL (ref 45–160)
LYMPHOCYTES # BLD AUTO: 1 K/UL (ref 1–4.8)
LYMPHOCYTES NFR BLD: 23.6 % (ref 18–48)
MCH RBC QN AUTO: 26 PG (ref 27–31)
MCHC RBC AUTO-ENTMCNC: 29.6 G/DL (ref 32–36)
MCV RBC AUTO: 88 FL (ref 82–98)
MONOCYTES # BLD AUTO: 0.6 K/UL (ref 0.3–1)
MONOCYTES NFR BLD: 13 % (ref 4–15)
NEUTROPHILS # BLD AUTO: 2.5 K/UL (ref 1.8–7.7)
NEUTROPHILS NFR BLD: 58.3 % (ref 38–73)
NRBC BLD-RTO: 0 /100 WBC
PLATELET # BLD AUTO: 337 K/UL (ref 150–450)
PMV BLD AUTO: 10.9 FL (ref 9.2–12.9)
POTASSIUM SERPL-SCNC: 4.7 MMOL/L (ref 3.5–5.1)
PROT SERPL-MCNC: 6.6 G/DL (ref 6–8.4)
PROTHROMBIN TIME: 30.8 SEC (ref 9–12.5)
RBC # BLD AUTO: 3.12 M/UL (ref 4.6–6.2)
SATURATED IRON: 10 % (ref 20–50)
SODIUM SERPL-SCNC: 140 MMOL/L (ref 136–145)
TOTAL IRON BINDING CAPACITY: 226 UG/DL (ref 250–450)
TRANSFERRIN SERPL-MCNC: 153 MG/DL (ref 200–375)
WBC # BLD AUTO: 4.24 K/UL (ref 3.9–12.7)

## 2022-11-21 PROCEDURE — 82728 ASSAY OF FERRITIN: CPT | Performed by: INTERNAL MEDICINE

## 2022-11-21 PROCEDURE — 93793 PR ANTICOAGULANT MGMT FOR PT TAKING WARFARIN: ICD-10-PCS | Mod: S$GLB,,,

## 2022-11-21 PROCEDURE — 84466 ASSAY OF TRANSFERRIN: CPT | Performed by: INTERNAL MEDICINE

## 2022-11-21 PROCEDURE — 93793 ANTICOAG MGMT PT WARFARIN: CPT | Mod: S$GLB,,,

## 2022-11-21 PROCEDURE — 85610 PROTHROMBIN TIME: CPT | Performed by: INTERNAL MEDICINE

## 2022-11-21 PROCEDURE — 85025 COMPLETE CBC W/AUTO DIFF WBC: CPT | Performed by: INTERNAL MEDICINE

## 2022-11-21 PROCEDURE — 80053 COMPREHEN METABOLIC PANEL: CPT | Performed by: INTERNAL MEDICINE

## 2022-11-21 NOTE — PROGRESS NOTES
Please see Iron panel, patient takes 324mg of iron daily, confirmed with patient, do you recommend any changes?  Also note BS is low during some checks, patient is not on insulin and endorses that he sometimes does not eat prior to checking labs, do not think there is anything to really do for it but just wanted to make you aware

## 2022-11-22 PROCEDURE — G0179 MD RECERTIFICATION HHA PT: HCPCS | Mod: ,,, | Performed by: STUDENT IN AN ORGANIZED HEALTH CARE EDUCATION/TRAINING PROGRAM

## 2022-11-22 PROCEDURE — G0179 PR HOME HEALTH MD RECERTIFICATION: ICD-10-PCS | Mod: ,,, | Performed by: STUDENT IN AN ORGANIZED HEALTH CARE EDUCATION/TRAINING PROGRAM

## 2022-11-23 ENCOUNTER — DOCUMENT SCAN (OUTPATIENT)
Dept: HOME HEALTH SERVICES | Facility: HOSPITAL | Age: 57
End: 2022-11-23
Payer: COMMERCIAL

## 2022-11-23 ENCOUNTER — ANTI-COAG VISIT (OUTPATIENT)
Dept: CARDIOLOGY | Facility: CLINIC | Age: 57
End: 2022-11-23
Payer: COMMERCIAL

## 2022-11-23 ENCOUNTER — TELEPHONE (OUTPATIENT)
Dept: TRANSPLANT | Facility: CLINIC | Age: 57
End: 2022-11-23
Payer: COMMERCIAL

## 2022-11-23 ENCOUNTER — HOSPITAL ENCOUNTER (OUTPATIENT)
Dept: CARDIOLOGY | Facility: HOSPITAL | Age: 57
Discharge: HOME | End: 2022-11-23
Attending: INTERNAL MEDICINE
Payer: COMMERCIAL

## 2022-11-23 ENCOUNTER — OFFICE VISIT (OUTPATIENT)
Dept: INFECTIOUS DISEASES | Facility: CLINIC | Age: 57
End: 2022-11-23
Payer: COMMERCIAL

## 2022-11-23 ENCOUNTER — OFFICE VISIT (OUTPATIENT)
Dept: PLASTIC SURGERY | Facility: CLINIC | Age: 57
End: 2022-11-23
Payer: COMMERCIAL

## 2022-11-23 VITALS — WEIGHT: 214 LBS | SYSTOLIC BLOOD PRESSURE: 80 MMHG | BODY MASS INDEX: 28.36 KG/M2 | HEART RATE: 75 BPM | HEIGHT: 73 IN

## 2022-11-23 VITALS — TEMPERATURE: 98 F | WEIGHT: 216.06 LBS | HEIGHT: 73 IN | BODY MASS INDEX: 28.63 KG/M2

## 2022-11-23 DIAGNOSIS — I72.4 PSEUDOANEURYSM OF RIGHT FEMORAL ARTERY: ICD-10-CM

## 2022-11-23 DIAGNOSIS — R60.9 SWELLING OF SURGICAL SITE, INITIAL ENCOUNTER: ICD-10-CM

## 2022-11-23 DIAGNOSIS — B44.89: Primary | ICD-10-CM

## 2022-11-23 DIAGNOSIS — T79.2XXA SEROMA DUE TO TRAUMA: Primary | ICD-10-CM

## 2022-11-23 DIAGNOSIS — Z09 SURGERY FOLLOW-UP EXAMINATION: Primary | ICD-10-CM

## 2022-11-23 DIAGNOSIS — T79.2XXD TRAUMATIC SEROMA OF RIGHT THIGH, SUBSEQUENT ENCOUNTER: ICD-10-CM

## 2022-11-23 DIAGNOSIS — T81.89XA SWELLING OF SURGICAL SITE, INITIAL ENCOUNTER: ICD-10-CM

## 2022-11-23 DIAGNOSIS — I72.9 MYCOTIC ANEURYSM: ICD-10-CM

## 2022-11-23 DIAGNOSIS — Z95.811 LVAD (LEFT VENTRICULAR ASSIST DEVICE) PRESENT: ICD-10-CM

## 2022-11-23 DIAGNOSIS — A49.9 ESBL (EXTENDED SPECTRUM BETA-LACTAMASE) PRODUCING BACTERIA INFECTION: ICD-10-CM

## 2022-11-23 DIAGNOSIS — Z79.2 ANTIBIOTIC LONG-TERM USE: ICD-10-CM

## 2022-11-23 DIAGNOSIS — Z79.2 RECEIVING INTRAVENOUS ANTIBIOTIC TREATMENT AS OUTPATIENT: ICD-10-CM

## 2022-11-23 DIAGNOSIS — Z16.12 ESBL (EXTENDED SPECTRUM BETA-LACTAMASE) PRODUCING BACTERIA INFECTION: ICD-10-CM

## 2022-11-23 LAB
ASCENDING AORTA: 2.85 CM
BSA FOR ECHO PROCEDURE: 2.24 M2
CV ECHO LV RWT: 0.24 CM
DOP CALC LVOT AREA: 4.3 CM2
DOP CALC LVOT DIAMETER: 2.35 CM
ECHO LV POSTERIOR WALL: 0.89 CM (ref 0.6–1.1)
EJECTION FRACTION: 13 %
FRACTIONAL SHORTENING: 7 % (ref 28–44)
INTERVENTRICULAR SEPTUM: 0.86 CM (ref 0.6–1.1)
LA MAJOR: 6.55 CM
LA MINOR: 6.07 CM
LA WIDTH: 4.08 CM
LEFT ATRIUM SIZE: 4.84 CM
LEFT ATRIUM VOLUME INDEX MOD: 37.2 ML/M2
LEFT ATRIUM VOLUME INDEX: 47.9 ML/M2
LEFT ATRIUM VOLUME MOD: 82.15 CM3
LEFT ATRIUM VOLUME: 105.76 CM3
LEFT INTERNAL DIMENSION IN SYSTOLE: 6.9 CM (ref 2.1–4)
LEFT VENTRICLE DIASTOLIC VOLUME INDEX: 130.32 ML/M2
LEFT VENTRICLE DIASTOLIC VOLUME: 288.01 ML
LEFT VENTRICLE MASS INDEX: 135 G/M2
LEFT VENTRICLE SYSTOLIC VOLUME INDEX: 112 ML/M2
LEFT VENTRICLE SYSTOLIC VOLUME: 247.46 ML
LEFT VENTRICULAR INTERNAL DIMENSION IN DIASTOLE: 7.38 CM (ref 3.5–6)
LEFT VENTRICULAR MASS: 299.37 G
PISA TR MAX VEL: 2.71 M/S
RA MAJOR: 5.59 CM
RA PRESSURE: 3 MMHG
RA WIDTH: 5.96 CM
RIGHT VENTRICULAR END-DIASTOLIC DIMENSION: 5.02 CM
RV TISSUE DOPPLER FREE WALL SYSTOLIC VELOCITY 1 (APICAL 4 CHAMBER VIEW): 5.75 CM/S
SINUS: 3.1 CM
STJ: 3 CM
TDI LATERAL: 0.06 M/S
TDI SEPTAL: 0.05 M/S
TDI: 0.06 M/S
TR MAX PG: 29 MMHG
TRICUSPID ANNULAR PLANE SYSTOLIC EXCURSION: 1.43 CM
TV REST PULMONARY ARTERY PRESSURE: 32 MMHG

## 2022-11-23 PROCEDURE — 1159F PR MEDICATION LIST DOCUMENTED IN MEDICAL RECORD: ICD-10-PCS | Mod: CPTII,S$GLB,, | Performed by: SURGERY

## 2022-11-23 PROCEDURE — 93793 ANTICOAG MGMT PT WARFARIN: CPT | Mod: S$GLB,,,

## 2022-11-23 PROCEDURE — 99999 PR PBB SHADOW E&M-EST. PATIENT-LVL III: CPT | Mod: PBBFAC,,, | Performed by: SURGERY

## 2022-11-23 PROCEDURE — 99214 OFFICE O/P EST MOD 30 MIN: CPT | Mod: PBBFAC,25 | Performed by: STUDENT IN AN ORGANIZED HEALTH CARE EDUCATION/TRAINING PROGRAM

## 2022-11-23 PROCEDURE — 99999 PR PBB SHADOW E&M-EST. PATIENT-LVL III: ICD-10-PCS | Mod: PBBFAC,,, | Performed by: SURGERY

## 2022-11-23 PROCEDURE — 93793 PR ANTICOAGULANT MGMT FOR PT TAKING WARFARIN: ICD-10-PCS | Mod: S$GLB,,,

## 2022-11-23 PROCEDURE — 99999 PR PBB SHADOW E&M-EST. PATIENT-LVL IV: CPT | Mod: PBBFAC,,, | Performed by: STUDENT IN AN ORGANIZED HEALTH CARE EDUCATION/TRAINING PROGRAM

## 2022-11-23 PROCEDURE — 99024 POSTOP FOLLOW-UP VISIT: CPT | Mod: S$GLB,,, | Performed by: SURGERY

## 2022-11-23 PROCEDURE — 99215 PR OFFICE/OUTPT VISIT, EST, LEVL V, 40-54 MIN: ICD-10-PCS | Mod: S$GLB,,, | Performed by: STUDENT IN AN ORGANIZED HEALTH CARE EDUCATION/TRAINING PROGRAM

## 2022-11-23 PROCEDURE — 1160F PR REVIEW ALL MEDS BY PRESCRIBER/CLIN PHARMACIST DOCUMENTED: ICD-10-PCS | Mod: CPTII,S$GLB,, | Performed by: SURGERY

## 2022-11-23 PROCEDURE — 99215 OFFICE O/P EST HI 40 MIN: CPT | Mod: S$GLB,,, | Performed by: STUDENT IN AN ORGANIZED HEALTH CARE EDUCATION/TRAINING PROGRAM

## 2022-11-23 PROCEDURE — 93306 TTE W/DOPPLER COMPLETE: CPT | Mod: 26,,, | Performed by: INTERNAL MEDICINE

## 2022-11-23 PROCEDURE — 1160F RVW MEDS BY RX/DR IN RCRD: CPT | Mod: CPTII,S$GLB,, | Performed by: SURGERY

## 2022-11-23 PROCEDURE — 93306 ECHO (CUPID ONLY): ICD-10-PCS | Mod: 26,,, | Performed by: INTERNAL MEDICINE

## 2022-11-23 PROCEDURE — 99024 PR POST-OP FOLLOW-UP VISIT: ICD-10-PCS | Mod: S$GLB,,, | Performed by: SURGERY

## 2022-11-23 PROCEDURE — 1159F MED LIST DOCD IN RCRD: CPT | Mod: CPTII,S$GLB,, | Performed by: SURGERY

## 2022-11-23 PROCEDURE — 93306 TTE W/DOPPLER COMPLETE: CPT

## 2022-11-23 PROCEDURE — 99999 PR PBB SHADOW E&M-EST. PATIENT-LVL IV: ICD-10-PCS | Mod: PBBFAC,,, | Performed by: STUDENT IN AN ORGANIZED HEALTH CARE EDUCATION/TRAINING PROGRAM

## 2022-11-23 NOTE — PROGRESS NOTES
Pt reports he did not have any greens on 11/21. Pt reports starting new dose (200mg) of voriconazole last week. Denies any other changes. Confirmed correct warfarin dose

## 2022-11-23 NOTE — PROGRESS NOTES
Plastic Surgery Clinic Postop Visit    Subjective:      Tim Richards is a 56 y.o. year old male who presents to the Plastic Surgery Clinic on 11/23/2022 for follow up visit status post right rectus femoris and sartorius flap to cover right groin wound on 9/27/22. Patient has some fluid in right groin noted. Denies fever, chills, nausea, vomiting, or other systemic signs of infection.    There were no vitals filed for this visit.     Review of patient's allergies indicates:   Allergen Reactions    Lisinopril Anaphylaxis    Hydralazine analogues      Chronic constipation, impotence, dizziness       Current Outpatient Medications on File Prior to Visit   Medication Sig Dispense Refill    ALPRAZolam (XANAX) 1 MG tablet Take 0.5-1 tablets (0.5-1 mg total) by mouth 2 (two) times daily as needed for Anxiety. 30 tablet 5    amiodarone (PACERONE) 200 MG Tab Take 2 tablets (400 mg total) by mouth once daily. 180 tablet 3    ferrous gluconate (FERGON) 324 MG tablet Take 1 tablet (324 mg total) by mouth with lunch. 30 tablet 11    levothyroxine (SYNTHROID) 50 MCG tablet Take 1 tablet (50 mcg total) by mouth before breakfast. 90 tablet 3    magnesium oxide (MAG-OX) 400 mg (241.3 mg magnesium) tablet Take 1 tablet (400 mg total) by mouth 2 (two) times daily. 90 tablet 11    mexiletine (MEXITIL) 250 MG Cap Take 1 capsule (250 mg total) by mouth every 8 (eight) hours. 90 capsule 3    mirtazapine (REMERON) 30 MG tablet Take 1 tablet (30 mg total) by mouth every evening. 90 tablet 1    omega-3 acid ethyl esters (LOVAZA) 1 gram capsule Take 2 capsules (2 g total) by mouth 2 (two) times daily. 360 capsule 3    pantoprazole (PROTONIX) 40 MG tablet Take 1 tablet (40 mg total) by mouth daily with lunch. 90 tablet 3    sodium chloride 0.9% SolP 100 mL with ertapenem 1 gram SolR 1 g Inject 1 g into the vein once daily.      voriconazole (VFEND) 200 MG Tab Take 1 tablet (200 mg total) by mouth 2 (two) times daily. Take 400 mg  (2 tabs) by mouth twice a day for 1 day then 200 mg (1 tab) twice a day 60 tablet 2    warfarin (COUMADIN) 5 MG tablet Take a half tablet (2.5mg) by mouth on 10/5 only. Then take 1 tablet (5mg) daily 135 tablet 3    [DISCONTINUED] sildenafiL (VIAGRA) 100 MG tablet Take 1 tablet (100 mg total) by mouth daily as needed for Erectile Dysfunction. 6 tablet 3     No current facility-administered medications on file prior to visit.       Patient Active Problem List   Diagnosis    Cigarette nicotine dependence without complication    Alcohol abuse    Pulmonary nodule seen on imaging study    Chronic combined systolic and diastolic congestive heart failure    Palliative care encounter    Hypoglycemia    Hyperbilirubinemia    Anemia    Hypertension    LVAD (left ventricular assist device) present    Pre-transplant evaluation for heart transplant    GIB (gastrointestinal bleeding)    Thrombocytopenia, unspecified    GI bleed    VT (ventricular tachycardia)    Amiodarone-induced hyperthyroidism    Substance or medication-induced sleep disorder, insomnia type    VF (ventricular fibrillation)    CHICHI (obstructive sleep apnea)    Post traumatic stress disorder (PTSD)    Acute on chronic combined systolic and diastolic congestive heart failure    History of ventricular tachycardia    Severe malnutrition    Atrial flutter    Adjustment disorder with mixed anxiety and depressed mood    Hyponatremia    Stage 3b chronic kidney disease    Hypertensive cardiovascular-renal disease, stage 1-4 or unspecified chronic kidney disease, with heart failure    High risk medications (not anticoagulants) long-term use    Dilated cardiomyopathy    Anticoagulation monitoring, INR range 2-3    Impaired mobility    Acute on chronic combined systolic and diastolic heart failure    Left ventricular assist device (LVAD) complication, subsequent encounter    History of COVID-19    Groin hematoma    Pseudoaneurysm of right femoral artery    Mycotic aneurysm     Tracheostomy present       Past Surgical History:   Procedure Laterality Date    AORTIC VALVULOPLASTY N/A 7/13/2022    Procedure: REPAIR, AORTIC VALVE;  Surgeon: Yg Kaufman MD;  Location: [facility] OR 2ND FLR;  Service: Cardiovascular;  Laterality: N/A;    APPLICATION OF WOUND VACUUM-ASSISTED CLOSURE DEVICE N/A 7/15/2022    Procedure: APPLICATION, WOUND VAC;  Surgeon: Yg Kaufman MD;  Location: [facility] OR 2ND FLR;  Service: Cardiovascular;  Laterality: N/A;  50 x 5 cm     APPLICATION OF WOUND VACUUM-ASSISTED CLOSURE DEVICE Right 9/27/2022    Procedure: APPLICATION, WOUND VAC;  Surgeon: Kole Tabares MD;  Location: [facility] OR 2ND FLR;  Service: Plastics;  Laterality: Right;    CARDIAC CATHETERIZATION  Dec. 2012    CARDIAC DEFIBRILLATOR PLACEMENT Left     CRRT-D    COLONOSCOPY N/A 3/6/2018    Procedure: COLONOSCOPY;  Surgeon: Alonso Bone MD;  Location: [facility] ENDO (2ND FLR);  Service: Endoscopy;  Laterality: N/A;    COLONOSCOPY N/A 7/17/2019    Procedure: COLONOSCOPY;  Surgeon: Blane Valdez MD;  Location: [facility] ENDO (2ND FLR);  Service: Endoscopy;  Laterality: N/A;    COLONOSCOPY N/A 7/18/2019    Procedure: COLONOSCOPY;  Surgeon: Blane Valdez MD;  Location: [facility] ENDO (2ND FLR);  Service: Endoscopy;  Laterality: N/A;    CREATION OF ILIOFEMORAL ARTERY BYPASS Right 9/27/2022    Procedure: CREATION, BYPASS, ARTERIAL, ILIAC TO FEMORAL WITH GRAFT;  Surgeon: Zach Hernández MD;  Location: [facility] OR 2ND FLR;  Service: Peripheral Vascular;  Laterality: Right;    CREATION OF MUSCLE ROTATIONAL FLAP Right 9/27/2022    Procedure: CREATION, FLAP, MUSCLE ROTATION, SARTORIUS AND RECTUS FEMORIS;  Surgeon: Kole Tabares MD;  Location: [facility] OR 2ND FLR;  Service: Plastics;  Laterality: Right;    ESOPHAGOGASTRODUODENOSCOPY N/A 7/17/2019    Procedure: EGD (ESOPHAGOGASTRODUODENOSCOPY);  Surgeon: Blane Valdez MD;  Location: [facility] ENDO (2ND FLR);  Service: Endoscopy;  Laterality: N/A;    ESOPHAGOGASTRODUODENOSCOPY N/A 7/18/2019     Procedure: EGD (ESOPHAGOGASTRODUODENOSCOPY);  Surgeon: Blane Valdez MD;  Location: [facility] ENDO (2ND FLR);  Service: Endoscopy;  Laterality: N/A;    FOREIGN BODY REMOVAL N/A 7/22/2022    Procedure: REMOVAL, FOREIGN BODY;  Surgeon: Yg Kaufman MD;  Location: [facility] OR [facility] FLR;  Service: Cardiovascular;  Laterality: N/A;  LVAD Heartmate 2 drive line removal    INSERTION OF GRAFT TO PERICARDIUM N/A 7/15/2022    Procedure: INSERTION, GRAFT, PERICARDIUM;  Surgeon: Yg Kaufman MD;  Location: [facility] OR [facility] FLR;  Service: Cardiovascular;  Laterality: N/A;    IRRIGATION OF MEDIASTINUM N/A 7/15/2022    Procedure: IRRIGATION, MEDIASTINUM;  Surgeon: Yg Kaufman MD;  Location: [facility] OR [facility] FLR;  Service: Cardiovascular;  Laterality: N/A;    LYSIS OF ADHESIONS  7/13/2022    Procedure: LYSIS, ADHESIONS;  Surgeon: Yg Kaufman MD;  [facility];  Service: Cardiovascular;;    NONINVASIVE CARDIAC ELECTROPHYSIOLOGY STUDY N/A 10/18/2019    Procedure: CARDIAC ELECTROPHYSIOLOGY STUDY, NONINVASIVE;  Surgeon: Raz Wagner MD;  Location: [facility] EP LAB;  Service: Cardiology;  Laterality: N/A;  VT, DFTs, MDT CRTD in situ, LVAD, LASHELL pizarro, 3098    REPLACEMENT OF IMPLANTABLE CARDIOVERTER-DEFIBRILLATOR (ICD) GENERATOR N/A 3/9/2020    Procedure: REPLACEMENT, ICD GENERATOR;  Surgeon: Harry Yun MD;  Location: [facility] EP LAB;  Service: Cardiology;  Laterality: N/A;  VT, ICD Gen Change and Lead Revision, MDT, MAC, DM,3 Prep    REPLACEMENT OF LEFT VENTRICULAR ASSIST DEVICE (LVAD)  7/13/2022    Procedure: REPLACEMENT, LVAD;  Surgeon: Yg Kaufman MD;  [facility];  Service: Cardiovascular;;    REPLACEMENT OF PUMP N/A 7/13/2022    Procedure: REPLACEMENT, PUMP;  Surgeon: Yg Kaufman MD;  [facility];  Service: Cardiovascular;  Laterality: N/A;  LVAD pump exchange  EXPLANATION OF HEATMATE 2  IMPLANTATION OF HEARTMATE 3  IMPLANTATION OF 8MM CHIMNEY GRAFT TO RFA  INITIATION OF ECMO  TEMPORARY CLOSURE  OF CHEST    REVISION OF IMPLANTABLE CARDIOVERTER-DEFIBRILLATOR (ICD) ELECTRODE LEAD PLACEMENT N/A 3/9/2020    Procedure: REVISION, INSERTION, ELECTRODE LEAD, ICD;  Surgeon: Harry Yun MD;  Location: [facility] EP LAB;  Service: Cardiology;  Laterality: N/A;  VT, ICD Gen Change and Lead Revision, MDT, MAC, DM,3 Prep    STERNAL WOUND CLOSURE N/A 2022    Procedure: CLOSURE, WOUND, STERNUM;  Surgeon: Yg Kaufman MD;  Location: [facility] OR [facility] FLR;  Service: Cardiovascular;  Laterality: N/A;  temporary closure  evacuation of hematoma    STERNAL WOUND CLOSURE N/A 7/15/2022    Procedure: CLOSURE, WOUND, STERNUM;  Surgeon: Yg Kaufman MD;  Location: [facility] OR [facility] FLR;  Service: Cardiovascular;  Laterality: N/A;    TRACHEOSTOMY N/A 2022    Procedure: CREATION, TRACHEOSTOMY;  Surgeon: Germain Holt MD;  [facility]  Service: General;  Laterality: N/A;    TREATMENT OF CARDIAC ARRHYTHMIA  10/18/2019    Procedure: Cardioversion or Defibrillation;  Surgeon: Raz Wagner MD;  Location: [facility] EP LAB;  Service: Cardiology;;       Social History     Socioeconomic History    Marital status:     Number of children: 3   Occupational History     Employer: [employer]    Tobacco Use    Smoking status: Former     Packs/day: 1.00     Years: 31.00     Pack years: 31.00     Types: Cigarettes     Quit date: 2018     Years since quittin.8    Smokeless tobacco: Former    Tobacco comments:     pt is quiting on his own - pt stated not qualified for program;  pt  quit on his own   Substance and Sexual Activity    Alcohol use: No     Alcohol/week: 0.0 standard drinks     Comment: quit    Drug use: No    Sexual activity: Yes     Partners: Female     Birth control/protection: None     Comment: 10/5/17  with same partner 7 years    Social History Narrative    Disabled     Social Determinants of Health     Financial Resource Strain: Low Risk     Difficulty of Paying Living Expenses: Not  very hard   Food Insecurity: Unknown    Worried About Running Out of Food in the Last Year: Patient refused    Ran Out of Food in the Last Year: Patient refused   Transportation Needs: Unknown    Lack of Transportation (Medical): Patient refused    Lack of Transportation (Non-Medical): Patient refused   Physical Activity: Insufficiently Active    Days of Exercise per Week: 3 days    Minutes of Exercise per Session: 10 min   Stress: Stress Concern Present    Feeling of Stress : To some extent   Social Connections: Unknown    Frequency of Communication with Friends and Family: Patient refused    Frequency of Social Gatherings with Friends and Family: Patient refused    Active Member of Clubs or Organizations: No    Attends Club or Organization Meetings: Patient refused    Marital Status:    Housing Stability: Unknown    Unable to Pay for Housing in the Last Year: Patient refused    Unstable Housing in the Last Year: Patient refused           Review of Systems: negative    Objective:     Physical Exam:  There were no vitals filed for this visit.    WD WN NAD  VSS  Normal resp effort  Right groin with some swelling, wound almost closed, overgrowth of granulation tissue        Assessment:       No diagnosis found.    Plan:   56 y.o. male status post right rectus femoris and sartorius flap to cover right groin wound  - Doing well  - Refer to radiology to evaluate right groin fluid collection and possible drain placement  - Return to clinic in 2 weeks.       All questions were answered. The patient was advised to call the clinic with any questions or concerns prior to their next visit.       Oh Morel MD- Fellow

## 2022-11-23 NOTE — PROGRESS NOTES
INFECTIOUS DISEASE CLINIC  11/23/2022     Subjective:      Chief Complaint:   Chief Complaint   Patient presents with    Follow-up         History of Present Illness:    This is a 56 y.o. 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  Patient known to ID, please see prior notes for full details. Since discharge from the hospital pt states he has been doing ok. Missed/canceled a few ID follow up appts due to transportation issues. Saw vasc surgery recently with wound vac removed. Pt reports tolerating abx without issues and denies problems with PICC. Denies fevers or chills, though states he is seeing some drainage from his prior R groin surgical site.       Interval history:   11/23/22: Since last seen, he states he has been doing ok - noticed more swelling of his R groin. No drainage or pain noted. Tolerating erta without issues. Saw plastics today - attempted to drain fluid collection per patient, unable to aspirate. Reports taking vori, denies adverse reactions.     Review of Systems   Constitutional: Negative for chills and fever.   Skin:  Positive for poor wound healing.   All other systems reviewed and are negative.      Past Medical History:   Diagnosis Date    A-fib     Anticoagulant long-term use     Atrial flutter 7/30/2022    CHF (congestive heart failure)     Class 1 obesity due to excess calories with serious comorbidity and body mass index (BMI) of 31.0 to 31.9 in adult     Class 1 obesity due to excess calories with serious comorbidity and body mass index (BMI) of 32.0 to 32.9 in adult     Dilated cardiomyopathy 1/10/2018    Disorder of kidney and ureter     CKD    Encounter  for blood transfusion     Essential hypertension 8/28/2022    Gout     HTN (hypertension)     Hx of psychiatric care     ICD (implantable cardioverter-defibrillator) infection 7/1/2020    Psychiatric problem     Thyroid disease     Ventricular tachycardia (paroxysmal)      Past Surgical History:   Procedure Laterality Date    AORTIC VALVULOPLASTY N/A 7/13/2022    Procedure: REPAIR, AORTIC VALVE;  Surgeon: Yg Kaufman MD;  [facility];  Service: Cardiovascular;  Laterality: N/A;    APPLICATION OF WOUND VACUUM-ASSISTED CLOSURE DEVICE N/A 7/15/2022    Procedure: APPLICATION, WOUND VAC;  Surgeon: Yg Kaufman MD;  [facility];  Service: Cardiovascular;  Laterality: N/A;  50 x 5 cm     APPLICATION OF WOUND VACUUM-ASSISTED CLOSURE DEVICE Right 9/27/2022    Procedure: APPLICATION, WOUND VAC;  Surgeon: Kole Tabares MD;  [facility];  Service: Plastics;  Laterality: Right;    CARDIAC CATHETERIZATION  Dec. 2012    CARDIAC DEFIBRILLATOR PLACEMENT Left     CRRT-D    COLONOSCOPY N/A 3/6/2018    Procedure: COLONOSCOPY;  Surgeon: Alonso Bone MD;  Location: [facility] ENDO (2ND FLR);  Service: Endoscopy;  Laterality: N/A;    COLONOSCOPY N/A 7/17/2019    Procedure: COLONOSCOPY;  Surgeon: Blane Valdez MD;  Location: [facility] ENDO (2ND FLR);  Service: Endoscopy;  Laterality: N/A;    COLONOSCOPY N/A 7/18/2019    Procedure: COLONOSCOPY;  Surgeon: Blane Valdez MD;  Location: [facility] ENDO (2ND FLR);  Service: Endoscopy;  Laterality: N/A;    CREATION OF ILIOFEMORAL ARTERY BYPASS Right 9/27/2022    Procedure: CREATION, BYPASS, ARTERIAL, ILIAC TO FEMORAL WITH GRAFT;  Surgeon: Zach Hernández MD;  [facility];  Service: Peripheral Vascular;  Laterality: Right;    CREATION OF MUSCLE ROTATIONAL FLAP Right 9/27/2022    Procedure: CREATION, FLAP, MUSCLE ROTATION, SARTORIUS AND RECTUS FEMORIS;  Surgeon: Kole Tabares MD;  [facility];  Service: Plastics;   Laterality: Right;    ESOPHAGOGASTRODUODENOSCOPY N/A 7/17/2019    Procedure: EGD (ESOPHAGOGASTRODUODENOSCOPY);  Surgeon: Blane Valdez MD;  Location: [facility] ENDO (2ND FLR);  Service: Endoscopy;  Laterality: N/A;    ESOPHAGOGASTRODUODENOSCOPY N/A 7/18/2019    Procedure: EGD (ESOPHAGOGASTRODUODENOSCOPY);  Surgeon: Blane Valdez MD;  Location: [facility] ENDO (2ND FLR);  Service: Endoscopy;  Laterality: N/A;    FOREIGN BODY REMOVAL N/A 7/22/2022    Procedure: REMOVAL, FOREIGN BODY;  Surgeon: Yg Kaufman MD;  Location: [facility] OR 2ND FLR;  Service: Cardiovascular;  Laterality: N/A;  LVAD Heartmate 2 drive line removal    INSERTION OF GRAFT TO PERICARDIUM N/A 7/15/2022    Procedure: INSERTION, GRAFT, PERICARDIUM;  Surgeon: Yg Kaufman MD;  Location: [facility] OR [facility] FLR;  Service: Cardiovascular;  Laterality: N/A;    IRRIGATION OF MEDIASTINUM N/A 7/15/2022    Procedure: IRRIGATION, MEDIASTINUM;  Surgeon: Yg Kaufman MD;  Location: [facility] OR [facility] FLR;  Service: Cardiovascular;  Laterality: N/A;    LYSIS OF ADHESIONS  7/13/2022    Procedure: LYSIS, ADHESIONS;  Surgeon: Yg Kaufman MD;  [facility];  Service: Cardiovascular;;    NONINVASIVE CARDIAC ELECTROPHYSIOLOGY STUDY N/A 10/18/2019    Procedure: CARDIAC ELECTROPHYSIOLOGY STUDY, NONINVASIVE;  Surgeon: Raz Wagner MD;  Location: [facility] EP LAB;  Service: Cardiology;  Laterality: N/A;  VT, DFTs, MDT CRTD in situ, LVAD, LASHELL pizarro, 3098    REPLACEMENT OF IMPLANTABLE CARDIOVERTER-DEFIBRILLATOR (ICD) GENERATOR N/A 3/9/2020    Procedure: REPLACEMENT, ICD GENERATOR;  Surgeon: Harry Yun MD;  Location: [facility] EP LAB;  Service: Cardiology;  Laterality: N/A;  VT, ICD Gen Change and Lead Revision, MDT, MAC, DM,3 Prep    REPLACEMENT OF LEFT VENTRICULAR ASSIST DEVICE (LVAD)  7/13/2022    Procedure: REPLACEMENT, LVAD;  Surgeon: Yg Kaufman MD;  Location: [facility] OR [facility] FLR;  Service: Cardiovascular;;    REPLACEMENT OF PUMP N/A 7/13/2022    Procedure: REPLACEMENT, PUMP;   Surgeon: Yg Kaufman MD;  Location: [facility] OR [facility] FLR;  Service: Cardiovascular;  Laterality: N/A;  LVAD pump exchange  EXPLANATION OF HEATMATE 2  IMPLANTATION OF HEARTMATE 3  IMPLANTATION OF 8MM CHIMNEY GRAFT TO RFA  INITIATION OF ECMO  TEMPORARY CLOSURE OF CHEST    REVISION OF IMPLANTABLE CARDIOVERTER-DEFIBRILLATOR (ICD) ELECTRODE LEAD PLACEMENT N/A 3/9/2020    Procedure: REVISION, INSERTION, ELECTRODE LEAD, ICD;  Surgeon: Harry Yun MD;  Location: [facility] EP LAB;  Service: Cardiology;  Laterality: N/A;  VT, ICD Gen Change and Lead Revision, MDT, MAC, DM,3 Prep    STERNAL WOUND CLOSURE N/A 2022    Procedure: CLOSURE, WOUND, STERNUM;  Surgeon: Yg Kaufman MD;  Location: [facility] OR [facility] FLR;  Service: Cardiovascular;  Laterality: N/A;  temporary closure  evacuation of hematoma    STERNAL WOUND CLOSURE N/A 7/15/2022    Procedure: CLOSURE, WOUND, STERNUM;  Surgeon: Yg Kaufman MD;  Location: [facility] OR [facility] FLR;  Service: Cardiovascular;  Laterality: N/A;    TRACHEOSTOMY N/A 2022    Procedure: CREATION, TRACHEOSTOMY;  Surgeon: Germain Holt MD;  [facility];  Service: General;  Laterality: N/A;    TREATMENT OF CARDIAC ARRHYTHMIA  10/18/2019    Procedure: Cardioversion or Defibrillation;  Surgeon: Raz Wagner MD;  Location: [facility] EP LAB;  Service: Cardiology;;     Family History   Problem Relation Age of Onset    Hypertension Father     Diabetes Father     Coronary artery disease Father     Heart disease Father         CHF    No Known Problems Mother     Cancer Sister 54        breast CA    No Known Problems Brother     Anxiety disorder Neg Hx     Depression Neg Hx     Dementia Neg Hx     Bipolar disorder Neg Hx     Suicide Neg Hx      Social History     Tobacco Use    Smoking status: Former     Packs/day: 1.00     Years: 31.00     Pack years: 31.00     Types: Cigarettes     Quit date: 2018     Years since quittin.8    Smokeless tobacco: Former    Tobacco comments:     pt  is quiting on his own - pt stated not qualified for program; 2/16 pt  quit on his own   Substance Use Topics    Alcohol use: No     Alcohol/week: 0.0 standard drinks     Comment: quit    Drug use: No       Review of patient's allergies indicates:   Allergen Reactions    Lisinopril Anaphylaxis    Hydralazine analogues      Chronic constipation, impotence, dizziness         Objective:   VS (24h):   Vitals:    11/23/22 1241   Temp: 98 °F (36.7 °C)           Physical Exam  Constitutional:       General: He is not in acute distress.     Appearance: He is not ill-appearing or toxic-appearing.   HENT:      Head: Normocephalic and atraumatic.      Right Ear: External ear normal.      Left Ear: External ear normal.   Eyes:      General: No scleral icterus.        Right eye: No discharge.         Left eye: No discharge.   Neck:      Comments: Prior trach site well healed  Cardiovascular:      Comments: defib  Pulmonary:      Effort: Pulmonary effort is normal. No respiratory distress.   Abdominal:      General: There is no distension.      Palpations: Abdomen is soft.      Tenderness: There is no abdominal tenderness. There is no guarding.      Comments: LVAD site dressed   Musculoskeletal:         General: Swelling (R groin) present.      Right lower leg: No edema.      Left lower leg: No edema.   Skin:     General: Skin is warm and dry.      Comments: R groin wound - graft site healing well; however, swelling noted under graft; no induration or pain; no discharge  R PICC   Neurological:      Mental Status: He is alert and oriented to person, place, and time. Mental status is at baseline.      Motor: No weakness.   Psychiatric:         Mood and Affect: Mood normal.         Behavior: Behavior normal.         Immunization History   Administered Date(s) Administered    COVID-19, MRNA, LN-S, PF (MODERNA FULL 0.5 ML DOSE) 03/12/2021, 04/09/2021    COVID-19, MRNA, LN-S, PF (Pfizer) (Purple Cap) 12/04/2021    Hepatitis A /  Hepatitis B 02/23/2018    Influenza 11/01/2014, 08/17/2019    Influenza - Quadrivalent - PF *Preferred* (6 months and older) 09/23/2015, 09/21/2016, 10/05/2017, 03/05/2021    Influenza - Trivalent - PF (PED) 11/01/2014    Pneumococcal Conjugate - 13 Valent 11/01/2014    Pneumococcal Polysaccharide - 23 Valent 10/01/2015, 03/24/2016    Tdap 02/23/2018         Assessment:     1. 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 Pseudoaneurysm of right femoral artery  - VORICONAZOLE; Future  - US Soft Tissue, Groin Right; Future    3. Mycotic aneurysm    4. Receiving intravenous antibiotic treatment as outpatient    5. Antibiotic long-term use    6. ESBL (extended spectrum beta-lactamase) producing bacteria infection  - US Soft Tissue, Groin Right; Future    7. Swelling of surgical site, initial encounter  - US Soft Tissue, Groin Right; Future       55 yo male with complex medical history including HM3 2018 with prior prolonged hospital stay that was notable for hypoxic respiratory failure 2/2 to COVID, cardiogenic shock with AV repair with redo sternotomy, explant of prior LVAD, VA ECMO (decannulated 7/19/22) with take back for mediastinal washout/hematoma, kleb aerogenes vap s/p treatment with recent admission for drainage/bleeding around prior R ecmo cannula site, found to have infected pseudoaneurysm/mycotic aneurysm (superficial wound cx with ESBL Ecoli) s/p R groin exploration with explant of infected graft, creation of ileofem bypass with rif soaked dacron graft/muscle flap (OR cx with ESBL Ecoli, Citrobacter farmeri, and PM) on 6w of erta, with new growth of aspergillus flavus post-discharge from his surgical cultures - started on voriconazole on 11/9 after clinic visit. Tolerating vori and erta without issues.     Now with new R swelling under prior flap - attempted aspiration at plastics visit this morning - unable to obtain any fluid collection.  Discussed with patient that given this, will need to reimage to evaluate for new fluid collection. Pt voiced understanding.     Plan:     -Extend ertapenem x 2 week (now completing a 10w course) given new swelling   -notified infusion company of plan above  -continue voriconazole    -vori level today (HH was unable to obtain vori level previously)   -monitor DDI with warfarin  -continue weekly lab work while on IV abx - fax to ID clinic   -US of R groin - if evidence of fluid collection, will likely need further evaluation/aspiration; pt voiced understanding      These recommendations have been sent to and/or discussed with the following providers:   - vascular surgery, CTS, and HTS    Follow up in 4 weeks    Management of infected pseudoaneurysm was discussed with patient. Patient was given ample time for questions, all questions answered. Strict return precautions given to patient.       80  minutes of total time spent on the encounter, which includes face to face time and non-face to face time preparing to see the patient (eg, review of tests), Obtaining and/or reviewing separately obtained history, documenting clinical information in the electronic or other health record, independently interpreting results (not separately reported) and communicating results to the patient/family/caregiver, or care coordination (not separately reported).           Jessica Perez MD  Infectious Disease

## 2022-11-23 NOTE — TELEPHONE ENCOUNTER
Patient's iron panel reviewed by MD Bautista, orders given to increase iron after review with pharmacist RADHA Leal to Iron 324mg BID. Communicated to patient and order updated in chart. Patient verbalized understanding and agreement.

## 2022-11-25 ENCOUNTER — TELEPHONE (OUTPATIENT)
Dept: PLASTIC SURGERY | Facility: CLINIC | Age: 57
End: 2022-11-25
Payer: COMMERCIAL

## 2022-11-25 ENCOUNTER — DOCUMENT SCAN (OUTPATIENT)
Dept: HOME HEALTH SERVICES | Facility: HOSPITAL | Age: 57
End: 2022-11-25
Payer: COMMERCIAL

## 2022-11-25 DIAGNOSIS — T79.2XXD TRAUMATIC SEROMA OF RIGHT THIGH, SUBSEQUENT ENCOUNTER: Primary | ICD-10-CM

## 2022-11-25 RX ORDER — FERROUS GLUCONATE 324(38)MG
324 TABLET ORAL 2 TIMES DAILY WITH MEALS
Qty: 60 TABLET | Refills: 11 | Status: SHIPPED | OUTPATIENT
Start: 2022-11-25 | End: 2023-10-11 | Stop reason: SDUPTHER

## 2022-11-28 ENCOUNTER — ANTI-COAG VISIT (OUTPATIENT)
Dept: CARDIOLOGY | Facility: CLINIC | Age: 57
End: 2022-11-28
Payer: COMMERCIAL

## 2022-11-28 ENCOUNTER — DOCUMENT SCAN (OUTPATIENT)
Dept: HOME HEALTH SERVICES | Facility: HOSPITAL | Age: 57
End: 2022-11-28
Payer: COMMERCIAL

## 2022-11-28 ENCOUNTER — LAB VISIT (OUTPATIENT)
Dept: LAB | Facility: HOSPITAL | Age: 57
End: 2022-11-28
Attending: INTERNAL MEDICINE
Payer: COMMERCIAL

## 2022-11-28 DIAGNOSIS — Z79.01 LONG TERM (CURRENT) USE OF ANTICOAGULANTS: ICD-10-CM

## 2022-11-28 DIAGNOSIS — I13.0 HYPERTENSIVE HEART AND RENAL DISEASE WITH CONGESTIVE HEART FAILURE: Primary | ICD-10-CM

## 2022-11-28 DIAGNOSIS — N18.32 CHRONIC KIDNEY DISEASE (CKD) STAGE G3B/A1, MODERATELY DECREASED GLOMERULAR FILTRATION RATE (GFR) BETWEEN 30-44 ML/MIN/1.73 SQUARE METER AND ALBUMINURIA CREATININE RATIO LESS THAN 30 MG/G: ICD-10-CM

## 2022-11-28 DIAGNOSIS — I50.43 ACUTE ON CHRONIC COMBINED SYSTOLIC AND DIASTOLIC HEART FAILURE: ICD-10-CM

## 2022-11-28 LAB
ALBUMIN SERPL BCP-MCNC: 2.6 G/DL (ref 3.5–5.2)
ALP SERPL-CCNC: 76 U/L (ref 55–135)
ALT SERPL W/O P-5'-P-CCNC: 10 U/L (ref 10–44)
ANION GAP SERPL CALC-SCNC: 7 MMOL/L (ref 8–16)
AST SERPL-CCNC: 16 U/L (ref 10–40)
BASOPHILS # BLD AUTO: 0.01 K/UL (ref 0–0.2)
BASOPHILS NFR BLD: 0.2 % (ref 0–1.9)
BILIRUB SERPL-MCNC: 0.2 MG/DL (ref 0.1–1)
BUN SERPL-MCNC: 11 MG/DL (ref 6–20)
CALCIUM SERPL-MCNC: 8.4 MG/DL (ref 8.7–10.5)
CHLORIDE SERPL-SCNC: 108 MMOL/L (ref 95–110)
CO2 SERPL-SCNC: 26 MMOL/L (ref 23–29)
CREAT SERPL-MCNC: 1.4 MG/DL (ref 0.5–1.4)
DIFFERENTIAL METHOD: ABNORMAL
EOSINOPHIL # BLD AUTO: 0.2 K/UL (ref 0–0.5)
EOSINOPHIL NFR BLD: 4.9 % (ref 0–8)
ERYTHROCYTE [DISTWIDTH] IN BLOOD BY AUTOMATED COUNT: 16.7 % (ref 11.5–14.5)
EST. GFR  (NO RACE VARIABLE): 59 ML/MIN/1.73 M^2
GLUCOSE SERPL-MCNC: 63 MG/DL (ref 70–110)
HCT VFR BLD AUTO: 29 % (ref 40–54)
HGB BLD-MCNC: 8.6 G/DL (ref 14–18)
IMM GRANULOCYTES # BLD AUTO: 0.01 K/UL (ref 0–0.04)
IMM GRANULOCYTES NFR BLD AUTO: 0.2 % (ref 0–0.5)
INR PPP: 2.8 (ref 0.8–1.2)
LYMPHOCYTES # BLD AUTO: 1.1 K/UL (ref 1–4.8)
LYMPHOCYTES NFR BLD: 26.6 % (ref 18–48)
MCH RBC QN AUTO: 26.1 PG (ref 27–31)
MCHC RBC AUTO-ENTMCNC: 29.7 G/DL (ref 32–36)
MCV RBC AUTO: 88 FL (ref 82–98)
MONOCYTES # BLD AUTO: 0.5 K/UL (ref 0.3–1)
MONOCYTES NFR BLD: 10.7 % (ref 4–15)
NEUTROPHILS # BLD AUTO: 2.5 K/UL (ref 1.8–7.7)
NEUTROPHILS NFR BLD: 57.4 % (ref 38–73)
NRBC BLD-RTO: 0 /100 WBC
PLATELET # BLD AUTO: 364 K/UL (ref 150–450)
PMV BLD AUTO: 10.6 FL (ref 9.2–12.9)
POTASSIUM SERPL-SCNC: 4.7 MMOL/L (ref 3.5–5.1)
PROT SERPL-MCNC: 6.5 G/DL (ref 6–8.4)
PROTHROMBIN TIME: 28.4 SEC (ref 9–12.5)
RBC # BLD AUTO: 3.3 M/UL (ref 4.6–6.2)
SODIUM SERPL-SCNC: 141 MMOL/L (ref 136–145)
WBC # BLD AUTO: 4.28 K/UL (ref 3.9–12.7)

## 2022-11-28 PROCEDURE — 93793 ANTICOAG MGMT PT WARFARIN: CPT | Mod: S$GLB,,,

## 2022-11-28 PROCEDURE — 85025 COMPLETE CBC W/AUTO DIFF WBC: CPT | Performed by: INTERNAL MEDICINE

## 2022-11-28 PROCEDURE — 85610 PROTHROMBIN TIME: CPT | Performed by: INTERNAL MEDICINE

## 2022-11-28 PROCEDURE — 80053 COMPREHEN METABOLIC PANEL: CPT | Performed by: INTERNAL MEDICINE

## 2022-11-28 PROCEDURE — 93793 PR ANTICOAGULANT MGMT FOR PT TAKING WARFARIN: ICD-10-PCS | Mod: S$GLB,,,

## 2022-12-01 ENCOUNTER — CLINICAL SUPPORT (OUTPATIENT)
Dept: TRANSPLANT | Facility: CLINIC | Age: 57
End: 2022-12-01
Payer: MEDICARE

## 2022-12-01 ENCOUNTER — ANTI-COAG VISIT (OUTPATIENT)
Dept: CARDIOLOGY | Facility: CLINIC | Age: 57
End: 2022-12-01
Payer: MEDICARE

## 2022-12-01 ENCOUNTER — OFFICE VISIT (OUTPATIENT)
Dept: TRANSPLANT | Facility: CLINIC | Age: 57
End: 2022-12-01
Payer: MEDICARE

## 2022-12-01 ENCOUNTER — HOSPITAL ENCOUNTER (OUTPATIENT)
Dept: RADIOLOGY | Facility: HOSPITAL | Age: 57
Discharge: HOME | End: 2022-12-01
Attending: STUDENT IN AN ORGANIZED HEALTH CARE EDUCATION/TRAINING PROGRAM
Payer: MEDICARE

## 2022-12-01 VITALS — HEIGHT: 73 IN | SYSTOLIC BLOOD PRESSURE: 76 MMHG | BODY MASS INDEX: 27.3 KG/M2 | WEIGHT: 206 LBS | TEMPERATURE: 98 F

## 2022-12-01 DIAGNOSIS — Z79.01 LONG TERM (CURRENT) USE OF ANTICOAGULANTS: Primary | ICD-10-CM

## 2022-12-01 DIAGNOSIS — Z95.811 LVAD (LEFT VENTRICULAR ASSIST DEVICE) PRESENT: Chronic | ICD-10-CM

## 2022-12-01 DIAGNOSIS — R60.9 SWELLING OF SURGICAL SITE, INITIAL ENCOUNTER: ICD-10-CM

## 2022-12-01 DIAGNOSIS — Z95.811 HEART REPLACED BY HEART ASSIST DEVICE: Primary | ICD-10-CM

## 2022-12-01 DIAGNOSIS — Z95.811 LVAD (LEFT VENTRICULAR ASSIST DEVICE) PRESENT: ICD-10-CM

## 2022-12-01 DIAGNOSIS — I72.9 MYCOTIC ANEURYSM: ICD-10-CM

## 2022-12-01 DIAGNOSIS — T81.89XA SWELLING OF SURGICAL SITE, INITIAL ENCOUNTER: ICD-10-CM

## 2022-12-01 DIAGNOSIS — Z86.79 HISTORY OF VENTRICULAR TACHYCARDIA: ICD-10-CM

## 2022-12-01 DIAGNOSIS — Z16.12 ESBL (EXTENDED SPECTRUM BETA-LACTAMASE) PRODUCING BACTERIA INFECTION: ICD-10-CM

## 2022-12-01 DIAGNOSIS — I72.4 PSEUDOANEURYSM OF RIGHT FEMORAL ARTERY: ICD-10-CM

## 2022-12-01 DIAGNOSIS — I10 PRIMARY HYPERTENSION: Primary | Chronic | ICD-10-CM

## 2022-12-01 DIAGNOSIS — A49.9 ESBL (EXTENDED SPECTRUM BETA-LACTAMASE) PRODUCING BACTERIA INFECTION: ICD-10-CM

## 2022-12-01 DIAGNOSIS — B44.89: ICD-10-CM

## 2022-12-01 LAB
GRAM STN SPEC: NORMAL
GRAM STN SPEC: NORMAL

## 2022-12-01 PROCEDURE — 99215 OFFICE O/P EST HI 40 MIN: CPT | Mod: S$GLB,ICN,, | Performed by: INTERNAL MEDICINE

## 2022-12-01 PROCEDURE — 87075 CULTR BACTERIA EXCEPT BLOOD: CPT | Performed by: INTERNAL MEDICINE

## 2022-12-01 PROCEDURE — 87116 MYCOBACTERIA CULTURE: CPT | Performed by: INTERNAL MEDICINE

## 2022-12-01 PROCEDURE — 87102 FUNGUS ISOLATION CULTURE: CPT | Performed by: INTERNAL MEDICINE

## 2022-12-01 PROCEDURE — 93793 ANTICOAG MGMT PT WARFARIN: CPT | Mod: S$GLB,,,

## 2022-12-01 PROCEDURE — 99215 PR OFFICE/OUTPT VISIT, EST, LEVL V, 40-54 MIN: ICD-10-PCS | Mod: S$GLB,ICN,, | Performed by: INTERNAL MEDICINE

## 2022-12-01 PROCEDURE — 93750 PR INTERROGATE VENT ASSIST DEV, IN PERSON, W PHYSICIAN ANALYSIS: ICD-10-PCS | Mod: S$GLB,ICN,, | Performed by: INTERNAL MEDICINE

## 2022-12-01 PROCEDURE — 99999 PR PBB SHADOW E&M-EST. PATIENT-LVL IV: ICD-10-PCS | Mod: PBBFAC,,, | Performed by: INTERNAL MEDICINE

## 2022-12-01 PROCEDURE — 93750 INTERROGATION VAD IN PERSON: CPT | Mod: PBBFAC | Performed by: INTERNAL MEDICINE

## 2022-12-01 PROCEDURE — 99999 PR PBB SHADOW E&M-EST. PATIENT-LVL IV: CPT | Mod: PBBFAC,,, | Performed by: INTERNAL MEDICINE

## 2022-12-01 PROCEDURE — 99999 PR PBB SHADOW E&M-EST. PATIENT-LVL I: CPT | Mod: PBBFAC,,,

## 2022-12-01 PROCEDURE — 87205 SMEAR GRAM STAIN: CPT | Performed by: INTERNAL MEDICINE

## 2022-12-01 PROCEDURE — 87206 SMEAR FLUORESCENT/ACID STAI: CPT | Performed by: INTERNAL MEDICINE

## 2022-12-01 PROCEDURE — 76882 US LMTD JT/FCL EVL NVASC XTR: CPT | Mod: TC,RT

## 2022-12-01 PROCEDURE — 76882 US SOFT TISSUE, GROIN RIGHT: ICD-10-PCS | Mod: 26,RT,, | Performed by: RADIOLOGY

## 2022-12-01 PROCEDURE — 93793 PR ANTICOAGULANT MGMT FOR PT TAKING WARFARIN: ICD-10-PCS | Mod: S$GLB,,,

## 2022-12-01 PROCEDURE — 87076 CULTURE ANAEROBE IDENT EACH: CPT | Performed by: INTERNAL MEDICINE

## 2022-12-01 PROCEDURE — 99999 PR PBB SHADOW E&M-EST. PATIENT-LVL I: ICD-10-PCS | Mod: PBBFAC,,,

## 2022-12-01 PROCEDURE — 99214 OFFICE O/P EST MOD 30 MIN: CPT | Mod: PBBFAC | Performed by: INTERNAL MEDICINE

## 2022-12-01 PROCEDURE — 93750 INTERROGATION VAD IN PERSON: CPT | Mod: S$GLB,ICN,, | Performed by: INTERNAL MEDICINE

## 2022-12-01 PROCEDURE — 87070 CULTURE OTHR SPECIMN AEROBIC: CPT | Performed by: INTERNAL MEDICINE

## 2022-12-01 PROCEDURE — 76882 US LMTD JT/FCL EVL NVASC XTR: CPT | Mod: 26,RT,, | Performed by: RADIOLOGY

## 2022-12-01 PROCEDURE — 99211 OFF/OP EST MAY X REQ PHY/QHP: CPT | Mod: PBBFAC,27,25

## 2022-12-01 NOTE — H&P (VIEW-ONLY)
Subjective:   Patient ID:  Tim Richards is a 57 y.o. male who presents for LVAD followup visit.    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     Post-op course was prolonged and complicated by worsening cardiogenic shock/RV failure requiring VA-ECMO that was successfully decannulated and Klebsiella aerogenes pneumonia. He also had episodes of VT with ICD discharge 7/21 requiring amio and NSVT episodes requiring addition of lidocaine gtt. This was transitioned to PO amiodarone and mexiletine. He unfortunately required re-intubation 7/29 for hypercapneic respiratory failure after initial extubation 7/27 and developed Aflutter with RVR with drop in PI and BP requiring DCCV. He ultimately underwent tracheostomy 8/4 and weaned off Epi 8/8. He briefly required TPN. He was transitioned back to enteral feeding and subsequently improved from a swallow standpoint and tolerated regular diet. He was having issues with vertigo-like symptoms with unremarkable ENT workup and negative CT heads. Due to higher prolonged doses of mexiletine during hospitalization, this was reduced to 250 mg TID. Amiodarone was adjusted after note of some CT liver parenchyma attenuation with report of copper/iron/med deposition but due to reduction of mexiletine, this was increased to 400 mg daily. The CT had also shown possible signs suggestive of avascular necrosis of the femoral head and discussion with Endocrinology was held to wean steroid dosing for  "amiodarone-induced hyperthyroidism with recommendations for prednisone 5 mg daily to be continued through 8/31 then discontinued. He remained on methimazole. At the time of discharge, he had a size 8 cuffed Shiley trach which was subsequently removed.     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 He is seen by both vascular surgery and ID for this. He saw ID 1 week prior. He is currently on ertapenem, planned for a total of 10 wk course, and voriconazole which they plan to continue for an extended period of time.    He was discharged on 9/1/2022. Has done well since.    He was seen in clinic most recently one month prior by Dr Bautista and presents today for follow up.    Overall from a cardiac standpoint he is doing well.  Denies any leg swelling, chest discomfort, shortness of breath, palpitations, presyncope, syncope, PND, or orthopnea.  Did have a few falls at home when he stood up too quickly, but no syncopal events per se.    He does note some persistent discharge from his right groin site.  Also noticed some discharge from his driveline exit site.    Implant Date: Heartmate II on 3/8/2018 (outflow graft changed 3/9/2018), exchanged to Heartmate 3 on 7/13/2022  Initials:RBB     Heartmate 3 RPM 6400        INR goal: 2-3 (Pt declined INR meter until changing insurance in 12/2022)  NO Bridge with heparin  Antiplatelets: ASA 81mg        GIB: 7/16/19 (10 units of blood)  Integrelin/TPA:  Stroke:     DLES:"2" retained suture   ABX: right groin E. Coli: *needs lifelong antibx per Dr. Kaufman.   Dr. Perez 11/9/22:  -Extend ertapenem x 2 week (ina 11/23) " given drainage seen today in clinic  -Start voriconazole (load 400 mg BID for 2 doses, followed by maintenance 200 mg BID); anticipate prolonged course of therapy given new graft placement  -continue weekly lab work while on IV abx - fax to ID clinic   -follow up with plastics pending     Dressing: daily with kit- Order Wife 6.5 gloves (item #[id number])     Appts: monthly     Home Health:   [facility] (ph: [phone], f: [phone])   [facility] ([phone]). [place] [phone]  [facility]: PICC care, right groin, weekly labs        Speed changes  Date-Speed   7/13-15/2022 with RVAD 5800, 5600, 5500   7/19/22 6200   7/28/22 6000   7/29/22 6200  7/31/22 6300   9/27/22: 6400      Comments:   1/12/2020: VT, s/p failed ATP x3, accelerated to VT in VF zone (all HD tolerated, x2.4h). Failed high-output shocks. Patient refusing life vest per Dr. Yun.   9/28/2022: Right Groin exploration. Resection, R common femoral artery. Creation of Right Iliofemoral bypass (end to end). Distal External Iliac to distal femoral artery. Explant, infected dacron femoral conduit. Muscle flap created. Wound vac and CLEO drain placed.  10/3/2022: trach removed by general surgery  10/5/2022: right groin CLEO drain removed     6 Minute Walk (6 months post implant) ;Done 03/25/2021      Last Echo:11/23/2022 Due: 5/2023 (Every 6 months)  Last Lipids: 05/19/2022 Due: 11/2023 (Every 6 months)     On Amiodarone:        Last TSH/T4: 08/26/2022 Due: 2/2023(every 6 months)        Last PFTs: 07/07/2021  Due:7/2022 (every year)        Last Chest Xray: 09/24/2022 Due: 9/2023 (Every year)    Review of Systems   Constitutional: Negative for chills and fever.   HENT:  Negative for hearing loss.    Eyes:  Negative for visual disturbance.   Cardiovascular:  Negative for chest pain, dyspnea on exertion, irregular heartbeat, leg swelling, orthopnea, palpitations, paroxysmal nocturnal dyspnea and syncope.   Respiratory:  Negative for cough  "and shortness of breath.    Musculoskeletal:  Negative for muscle weakness.   Gastrointestinal:  Negative for diarrhea, nausea and vomiting.   Neurological:  Negative for focal weakness.   Psychiatric/Behavioral:  Negative for memory loss.      Objective:   Blood pressure (!) 76/0, temperature 97.9 °F (36.6 °C), temperature source Oral, height 6' 1" (1.854 m), weight 93.4 kg (206 lb).body mass index is 27.18 kg/m².    Doppler: 76    Physical Exam  Vitals reviewed.   Constitutional:       General: He is not in acute distress.  HENT:      Head: Atraumatic.   Eyes:      Extraocular Movements: Extraocular movements intact.   Cardiovascular:      Comments: LVAD hum present. JVP 10 cm.  Pulmonary:      Breath sounds: Normal breath sounds.   Abdominal:      Palpations: Abdomen is soft.      Tenderness: There is no abdominal tenderness.   Musculoskeletal:         General: Normal range of motion.      Right lower leg: No edema.      Left lower leg: No edema.      Comments: R groin swelling with dressing overlying it, soaked with serosanguinous discharge.   Neurological:      General: No focal deficit present.      Mental Status: He is alert and oriented to person, place, and time.       Lab Results   Component Value Date    WBC 3.81 (L) 12/01/2022    HGB 9.9 (L) 12/01/2022    HCT 33.4 (L) 12/01/2022    MCV 88 12/01/2022     12/01/2022    CO2 26 12/01/2022    CREATININE 1.6 (H) 12/01/2022    CALCIUM 9.3 12/01/2022    ALBUMIN 3.0 (L) 12/01/2022    AST 21 12/01/2022     (H) 12/01/2022    ALT 10 12/01/2022     (H) 11/03/2022       Lab Results   Component Value Date    INR 3.0 (H) 12/01/2022    INR 2.8 (H) 11/28/2022    INR 3.1 (H) 11/23/2022       BNP   Date Value Ref Range Status   12/01/2022 309 (H) 0 - 99 pg/mL Final     Comment:     Values of less than 100 pg/ml are consistent with non-CHF populations.   11/03/2022 599 (H) 0 - 99 pg/mL Final     Comment:     Values of less than 100 pg/ml are consistent " with non-CHF populations.   10/05/2022 201 (H) 0 - 99 pg/mL Final     Comment:     Values of less than 100 pg/ml are consistent with non-CHF populations.       LD   Date Value Ref Range Status   11/03/2022 323 (H) 110 - 260 U/L Final     Comment:     Results are increased in hemolyzed samples.   10/05/2022 241 110 - 260 U/L Final     Comment:     Results are increased in hemolyzed samples.   10/04/2022 232 110 - 260 U/L Final     Comment:     Results are increased in hemolyzed samples.       Labs were reviewed with the patient.    No results found for this or any previous visit.    No results found for this or any previous visit.      Assessment:      1. Primary hypertension    2. LVAD (left ventricular assist device) present    3. History of ventricular tachycardia    4. Pseudoaneurysm of right femoral artery    5. Mycotic aneurysm        Plan:     S/P LVAD  - Doing OK, No HF symptoms  - At 6400 RPM, Doppler is 76, DL is a 2  - Will culture some DLES discharge and send for cultures  - JVP little elevated but no pedal edema and he denies symptoms of holding on to extra fluid or weight gain. Advised to watch for any HF symptoms or weight gain and let us know if any.    RFA pseudoaneurysm/mycotic aneurysm  - Getting US of his groin today and will coordinate with vascular surgery, ID, and plastic surgery  - On ertapenem and voriconazole  - Will follow US results and if demonstrating fluid collection will admit to hospital for further evaluation    DLES discharge  - Cultures sent    Patient is now NYHA II  Recommend 2 gram sodium restriction and 1500cc fluid restriction.  Encourage physical activity with graded exercise program.  Requested patient to weigh themselves daily, and to notify us if their weight increases by more than 3 lbs in 1 day or 5 lbs in 1 week.     Patient advised that it is recommended that all patients, and their close contacts and household members receive Covid vaccination.    Listed for  transplant: No    UNOS Patient Status  Functional Status: 80% - Normal activity with effort: some symptoms of disease  Physical Capacity: Limited Mobility  Working for Income: No  If no, reason not working: Demands of Treatment      Additionally, the patient has a patient centered goal of being able to improve their quality of life

## 2022-12-01 NOTE — LETTER
December 1, 2022        Nader Chiu  [address]  [address]  [place] [address]  Phone: [phone]  Fax: [phone]     Nader Chiu  [address]  [address]  [place] [address]  Phone: [phone]  Fax: [phone]             [facility]  [address]  [place] [address]  Phone: [phone]   Patient: Tim Richards   MR Number: [id number]   YOB: 1965   Date of Visit: 12/1/2022       Dear Dr. Nader Chiu, Nader Chiu    Thank you for referring Tim Richards to me for evaluation. Attached you will find relevant portions of my assessment and plan of care.    If you have questions, please do not hesitate to call me. I look forward to following Tim Richards along with you.    Sincerely,    Gaurav Rodriguez MD    Enclosure    If you would like to receive this communication electronically, please contact [email or web address] or [phone] to request [employer] [employer] access.    [employer] is a tool which provides read-only access to select patient information with whom you have a relationship. Its easy to use and provides real time access to review your patients record including encounter summaries, notes, results, and demographic information.    If you feel you have received this communication in error or would no longer like to receive these types of communications, please e-mail [email or web address]

## 2022-12-01 NOTE — PROGRESS NOTES
Preventative maintenance performed on patient's PPM-92998 and UBC-68830, thus PPM and UBC checklist completed. Issued 1 12V battery for the PPM S/N: [id number], [id number]. Back up controller charged and self test passed. This is medically necessary for the proper function of the LVAD system

## 2022-12-01 NOTE — PROGRESS NOTES
Subjective:   Patient ID:  Tim Richards is a 57 y.o. male who presents for LVAD followup visit.    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     Post-op course was prolonged and complicated by worsening cardiogenic shock/RV failure requiring VA-ECMO that was successfully decannulated and Klebsiella aerogenes pneumonia. He also had episodes of VT with ICD discharge 7/21 requiring amio and NSVT episodes requiring addition of lidocaine gtt. This was transitioned to PO amiodarone and mexiletine. He unfortunately required re-intubation 7/29 for hypercapneic respiratory failure after initial extubation 7/27 and developed Aflutter with RVR with drop in PI and BP requiring DCCV. He ultimately underwent tracheostomy 8/4 and weaned off Epi 8/8. He briefly required TPN. He was transitioned back to enteral feeding and subsequently improved from a swallow standpoint and tolerated regular diet. He was having issues with vertigo-like symptoms with unremarkable ENT workup and negative CT heads. Due to higher prolonged doses of mexiletine during hospitalization, this was reduced to 250 mg TID. Amiodarone was adjusted after note of some CT liver parenchyma attenuation with report of copper/iron/med deposition but due to reduction of mexiletine, this was increased to 400 mg daily. The CT had also shown possible signs suggestive of avascular necrosis of the femoral head and discussion with Endocrinology was held to wean steroid dosing for  "amiodarone-induced hyperthyroidism with recommendations for prednisone 5 mg daily to be continued through 8/31 then discontinued. He remained on methimazole. At the time of discharge, he had a size 8 cuffed Shiley trach which was subsequently removed.     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 He is seen by both vascular surgery and ID for this. He saw ID 1 week prior. He is currently on ertapenem, planned for a total of 10 wk course, and voriconazole which they plan to continue for an extended period of time.    He was discharged on 9/1/2022. Has done well since.    He was seen in clinic most recently one month prior by Dr Bautista and presents today for follow up.    Overall from a cardiac standpoint he is doing well.  Denies any leg swelling, chest discomfort, shortness of breath, palpitations, presyncope, syncope, PND, or orthopnea.  Did have a few falls at home when he stood up too quickly, but no syncopal events per se.    He does note some persistent discharge from his right groin site.  Also noticed some discharge from his driveline exit site.    Implant Date: Heartmate II on 3/8/2018 (outflow graft changed 3/9/2018), exchanged to Heartmate 3 on 7/13/2022  Initials:RBB     Heartmate 3 RPM 6400        INR goal: 2-3 (Pt declined INR meter until changing insurance in 12/2022)  NO Bridge with heparin  Antiplatelets: ASA 81mg        GIB: 7/16/19 (10 units of blood)  Integrelin/TPA:  Stroke:     DLES:"2" retained suture   ABX: right groin E. Coli: *needs lifelong antibx per Dr. Kaufman.   Dr. Perez 11/9/22:  -Extend ertapenem x 2 week (ina 11/23) " given drainage seen today in clinic  -Start voriconazole (load 400 mg BID for 2 doses, followed by maintenance 200 mg BID); anticipate prolonged course of therapy given new graft placement  -continue weekly lab work while on IV abx - fax to ID clinic   -follow up with plastics pending     Dressing: daily with kit- Order Wife 6.5 gloves (item #[id number])     Appts: monthly     Home Health:   [facility] (ph: [phone], f: [phone])   [facility] ([phone]). [place] [phone]  [facility]: PICC care, right groin, weekly labs        Speed changes  Date-Speed   7/13-15/2022 with RVAD 5800, 5600, 5500   7/19/22 6200   7/28/22 6000   7/29/22 6200  7/31/22 6300   9/27/22: 6400      Comments:   1/12/2020: VT, s/p failed ATP x3, accelerated to VT in VF zone (all HD tolerated, x2.4h). Failed high-output shocks. Patient refusing life vest per Dr. Yun.   9/28/2022: Right Groin exploration. Resection, R common femoral artery. Creation of Right Iliofemoral bypass (end to end). Distal External Iliac to distal femoral artery. Explant, infected dacron femoral conduit. Muscle flap created. Wound vac and CLEO drain placed.  10/3/2022: trach removed by general surgery  10/5/2022: right groin CLEO drain removed     6 Minute Walk (6 months post implant) ;Done 03/25/2021      Last Echo:11/23/2022 Due: 5/2023 (Every 6 months)  Last Lipids: 05/19/2022 Due: 11/2023 (Every 6 months)     On Amiodarone:        Last TSH/T4: 08/26/2022 Due: 2/2023(every 6 months)        Last PFTs: 07/07/2021  Due:7/2022 (every year)        Last Chest Xray: 09/24/2022 Due: 9/2023 (Every year)    Review of Systems   Constitutional: Negative for chills and fever.   HENT:  Negative for hearing loss.    Eyes:  Negative for visual disturbance.   Cardiovascular:  Negative for chest pain, dyspnea on exertion, irregular heartbeat, leg swelling, orthopnea, palpitations, paroxysmal nocturnal dyspnea and syncope.   Respiratory:  Negative for cough  "and shortness of breath.    Musculoskeletal:  Negative for muscle weakness.   Gastrointestinal:  Negative for diarrhea, nausea and vomiting.   Neurological:  Negative for focal weakness.   Psychiatric/Behavioral:  Negative for memory loss.      Objective:   Blood pressure (!) 76/0, temperature 97.9 °F (36.6 °C), temperature source Oral, height 6' 1" (1.854 m), weight 93.4 kg (206 lb).body mass index is 27.18 kg/m².    Doppler: 76    Physical Exam  Vitals reviewed.   Constitutional:       General: He is not in acute distress.  HENT:      Head: Atraumatic.   Eyes:      Extraocular Movements: Extraocular movements intact.   Cardiovascular:      Comments: LVAD hum present. JVP 10 cm.  Pulmonary:      Breath sounds: Normal breath sounds.   Abdominal:      Palpations: Abdomen is soft.      Tenderness: There is no abdominal tenderness.   Musculoskeletal:         General: Normal range of motion.      Right lower leg: No edema.      Left lower leg: No edema.      Comments: R groin swelling with dressing overlying it, soaked with serosanguinous discharge.   Neurological:      General: No focal deficit present.      Mental Status: He is alert and oriented to person, place, and time.       Lab Results   Component Value Date    WBC 3.81 (L) 12/01/2022    HGB 9.9 (L) 12/01/2022    HCT 33.4 (L) 12/01/2022    MCV 88 12/01/2022     12/01/2022    CO2 26 12/01/2022    CREATININE 1.6 (H) 12/01/2022    CALCIUM 9.3 12/01/2022    ALBUMIN 3.0 (L) 12/01/2022    AST 21 12/01/2022     (H) 12/01/2022    ALT 10 12/01/2022     (H) 11/03/2022       Lab Results   Component Value Date    INR 3.0 (H) 12/01/2022    INR 2.8 (H) 11/28/2022    INR 3.1 (H) 11/23/2022       BNP   Date Value Ref Range Status   12/01/2022 309 (H) 0 - 99 pg/mL Final     Comment:     Values of less than 100 pg/ml are consistent with non-CHF populations.   11/03/2022 599 (H) 0 - 99 pg/mL Final     Comment:     Values of less than 100 pg/ml are consistent " with non-CHF populations.   10/05/2022 201 (H) 0 - 99 pg/mL Final     Comment:     Values of less than 100 pg/ml are consistent with non-CHF populations.       LD   Date Value Ref Range Status   11/03/2022 323 (H) 110 - 260 U/L Final     Comment:     Results are increased in hemolyzed samples.   10/05/2022 241 110 - 260 U/L Final     Comment:     Results are increased in hemolyzed samples.   10/04/2022 232 110 - 260 U/L Final     Comment:     Results are increased in hemolyzed samples.       Labs were reviewed with the patient.    No results found for this or any previous visit.    No results found for this or any previous visit.      Assessment:      1. Primary hypertension    2. LVAD (left ventricular assist device) present    3. History of ventricular tachycardia    4. Pseudoaneurysm of right femoral artery    5. Mycotic aneurysm        Plan:     S/P LVAD  - Doing OK, No HF symptoms  - At 6400 RPM, Doppler is 76, DL is a 2  - Will culture some DLES discharge and send for cultures  - JVP little elevated but no pedal edema and he denies symptoms of holding on to extra fluid or weight gain. Advised to watch for any HF symptoms or weight gain and let us know if any.    RFA pseudoaneurysm/mycotic aneurysm  - Getting US of his groin today and will coordinate with vascular surgery, ID, and plastic surgery  - On ertapenem and voriconazole  - Will follow US results and if demonstrating fluid collection will admit to hospital for further evaluation    DLES discharge  - Cultures sent    Patient is now NYHA II  Recommend 2 gram sodium restriction and 1500cc fluid restriction.  Encourage physical activity with graded exercise program.  Requested patient to weigh themselves daily, and to notify us if their weight increases by more than 3 lbs in 1 day or 5 lbs in 1 week.     Patient advised that it is recommended that all patients, and their close contacts and household members receive Covid vaccination.    Listed for  transplant: No    UNOS Patient Status  Functional Status: 80% - Normal activity with effort: some symptoms of disease  Physical Capacity: Limited Mobility  Working for Income: No  If no, reason not working: Demands of Treatment      Additionally, the patient has a patient centered goal of being able to improve their quality of life

## 2022-12-02 ENCOUNTER — HOSPITAL ENCOUNTER (OUTPATIENT)
Facility: HOSPITAL | Age: 57
Discharge: HOME HEALTH | End: 2022-12-03
Attending: INTERNAL MEDICINE | Admitting: INTERNAL MEDICINE
Payer: MEDICARE

## 2022-12-02 ENCOUNTER — EXTERNAL HOME HEALTH (OUTPATIENT)
Dept: HOME HEALTH SERVICES | Facility: HOSPITAL | Age: 57
End: 2022-12-02
Payer: COMMERCIAL

## 2022-12-02 ENCOUNTER — DOCUMENTATION ONLY (OUTPATIENT)
Dept: TRANSPLANT | Facility: CLINIC | Age: 57
End: 2022-12-02

## 2022-12-02 ENCOUNTER — DOCUMENT SCAN (OUTPATIENT)
Dept: HOME HEALTH SERVICES | Facility: HOSPITAL | Age: 57
End: 2022-12-02
Payer: MEDICARE

## 2022-12-02 DIAGNOSIS — Z79.899 HIGH RISK MEDICATIONS (NOT ANTICOAGULANTS) LONG-TERM USE: ICD-10-CM

## 2022-12-02 DIAGNOSIS — Z86.79 HISTORY OF VENTRICULAR TACHYCARDIA: ICD-10-CM

## 2022-12-02 DIAGNOSIS — I72.4 PSEUDOANEURYSM OF RIGHT FEMORAL ARTERY: ICD-10-CM

## 2022-12-02 DIAGNOSIS — N18.32 STAGE 3B CHRONIC KIDNEY DISEASE: ICD-10-CM

## 2022-12-02 DIAGNOSIS — I97.648 POSTPROCEDURAL SEROMA OF A CIRCULATORY SYSTEM ORGAN OR STRUCTURE FOLLOWING OTHER CIRCULATORY SYSTEM PROCEDURE: Primary | ICD-10-CM

## 2022-12-02 DIAGNOSIS — I72.9 MYCOTIC ANEURYSM: ICD-10-CM

## 2022-12-02 DIAGNOSIS — I50.42 CHRONIC COMBINED SYSTOLIC AND DIASTOLIC HEART FAILURE: Chronic | ICD-10-CM

## 2022-12-02 DIAGNOSIS — Z95.811 HEART REPLACED BY HEART ASSIST DEVICE: ICD-10-CM

## 2022-12-02 DIAGNOSIS — T82.9XXD LEFT VENTRICULAR ASSIST DEVICE (LVAD) COMPLICATION, SUBSEQUENT ENCOUNTER: ICD-10-CM

## 2022-12-02 DIAGNOSIS — E03.9 HYPOTHYROIDISM, UNSPECIFIED TYPE: ICD-10-CM

## 2022-12-02 DIAGNOSIS — I42.0 DILATED CARDIOMYOPATHY: ICD-10-CM

## 2022-12-02 DIAGNOSIS — Z95.811 LVAD (LEFT VENTRICULAR ASSIST DEVICE) PRESENT: Chronic | ICD-10-CM

## 2022-12-02 DIAGNOSIS — I13.0 HYPERTENSIVE CARDIOVASCULAR-RENAL DISEASE, STAGE 1-4 OR UNSPECIFIED CHRONIC KIDNEY DISEASE, WITH HEART FAILURE: ICD-10-CM

## 2022-12-02 DIAGNOSIS — Z79.01 ANTICOAGULATION MONITORING, INR RANGE 2-3: ICD-10-CM

## 2022-12-02 LAB
ALBUMIN SERPL BCP-MCNC: 2.7 G/DL (ref 3.5–5.2)
ALP SERPL-CCNC: 71 U/L (ref 55–135)
ALT SERPL W/O P-5'-P-CCNC: 8 U/L (ref 10–44)
ANION GAP SERPL CALC-SCNC: 9 MMOL/L (ref 8–16)
AST SERPL-CCNC: 24 U/L (ref 10–40)
BASOPHILS # BLD AUTO: 0.03 K/UL (ref 0–0.2)
BASOPHILS NFR BLD: 0.7 % (ref 0–1.9)
BILIRUB SERPL-MCNC: 0.3 MG/DL (ref 0.1–1)
BUN SERPL-MCNC: 15 MG/DL (ref 6–20)
CALCIUM SERPL-MCNC: 9.5 MG/DL (ref 8.7–10.5)
CHLORIDE SERPL-SCNC: 107 MMOL/L (ref 95–110)
CO2 SERPL-SCNC: 26 MMOL/L (ref 23–29)
CREAT SERPL-MCNC: 1.4 MG/DL (ref 0.5–1.4)
DIFFERENTIAL METHOD: ABNORMAL
EOSINOPHIL # BLD AUTO: 0.2 K/UL (ref 0–0.5)
EOSINOPHIL NFR BLD: 4.4 % (ref 0–8)
ERYTHROCYTE [DISTWIDTH] IN BLOOD BY AUTOMATED COUNT: 17.2 % (ref 11.5–14.5)
EST. GFR  (NO RACE VARIABLE): 59 ML/MIN/1.73 M^2
GLUCOSE SERPL-MCNC: 74 MG/DL (ref 70–110)
HCT VFR BLD AUTO: 30.2 % (ref 40–54)
HGB BLD-MCNC: 8.9 G/DL (ref 14–18)
IMM GRANULOCYTES # BLD AUTO: 0.01 K/UL (ref 0–0.04)
IMM GRANULOCYTES NFR BLD AUTO: 0.2 % (ref 0–0.5)
LYMPHOCYTES # BLD AUTO: 1.1 K/UL (ref 1–4.8)
LYMPHOCYTES NFR BLD: 24.1 % (ref 18–48)
MAGNESIUM SERPL-MCNC: 2 MG/DL (ref 1.6–2.6)
MCH RBC QN AUTO: 25.9 PG (ref 27–31)
MCHC RBC AUTO-ENTMCNC: 29.5 G/DL (ref 32–36)
MCV RBC AUTO: 88 FL (ref 82–98)
MONOCYTES # BLD AUTO: 0.4 K/UL (ref 0.3–1)
MONOCYTES NFR BLD: 9.6 % (ref 4–15)
NEUTROPHILS # BLD AUTO: 2.7 K/UL (ref 1.8–7.7)
NEUTROPHILS NFR BLD: 61 % (ref 38–73)
NRBC BLD-RTO: 0 /100 WBC
PLATELET # BLD AUTO: 341 K/UL (ref 150–450)
PMV BLD AUTO: 9.9 FL (ref 9.2–12.9)
POTASSIUM SERPL-SCNC: 4.2 MMOL/L (ref 3.5–5.1)
PROT SERPL-MCNC: 7.3 G/DL (ref 6–8.4)
RBC # BLD AUTO: 3.43 M/UL (ref 4.6–6.2)
SODIUM SERPL-SCNC: 142 MMOL/L (ref 136–145)
WBC # BLD AUTO: 4.36 K/UL (ref 3.9–12.7)

## 2022-12-02 PROCEDURE — 20600001 HC STEP DOWN PRIVATE ROOM

## 2022-12-02 PROCEDURE — 87040 BLOOD CULTURE FOR BACTERIA: CPT | Performed by: NURSE PRACTITIONER

## 2022-12-02 PROCEDURE — G0379 DIRECT REFER HOSPITAL OBSERV: HCPCS

## 2022-12-02 PROCEDURE — G0378 HOSPITAL OBSERVATION PER HR: HCPCS

## 2022-12-02 PROCEDURE — 80053 COMPREHEN METABOLIC PANEL: CPT | Performed by: NURSE PRACTITIONER

## 2022-12-02 PROCEDURE — 85025 COMPLETE CBC W/AUTO DIFF WBC: CPT | Performed by: NURSE PRACTITIONER

## 2022-12-02 PROCEDURE — 63600175 PHARM REV CODE 636 W HCPCS: Performed by: NURSE PRACTITIONER

## 2022-12-02 PROCEDURE — 83735 ASSAY OF MAGNESIUM: CPT | Performed by: NURSE PRACTITIONER

## 2022-12-02 PROCEDURE — 25000003 PHARM REV CODE 250: Performed by: NURSE PRACTITIONER

## 2022-12-02 RX ORDER — LANOLIN ALCOHOL/MO/W.PET/CERES
400 CREAM (GRAM) TOPICAL 2 TIMES DAILY
Status: DISCONTINUED | OUTPATIENT
Start: 2022-12-02 | End: 2022-12-03 | Stop reason: HOSPADM

## 2022-12-02 RX ORDER — AMIODARONE HYDROCHLORIDE 200 MG/1
400 TABLET ORAL DAILY
Status: DISCONTINUED | OUTPATIENT
Start: 2022-12-03 | End: 2022-12-03 | Stop reason: HOSPADM

## 2022-12-02 RX ORDER — ALPRAZOLAM 0.5 MG/1
0.5 TABLET ORAL 2 TIMES DAILY PRN
Status: DISCONTINUED | OUTPATIENT
Start: 2022-12-02 | End: 2022-12-03 | Stop reason: HOSPADM

## 2022-12-02 RX ORDER — MUPIROCIN 20 MG/G
OINTMENT TOPICAL 2 TIMES DAILY
Status: CANCELLED | OUTPATIENT
Start: 2022-12-02 | End: 2022-12-07

## 2022-12-02 RX ORDER — MIRTAZAPINE 15 MG/1
30 TABLET, FILM COATED ORAL NIGHTLY
Status: DISCONTINUED | OUTPATIENT
Start: 2022-12-02 | End: 2022-12-03 | Stop reason: HOSPADM

## 2022-12-02 RX ORDER — PANTOPRAZOLE SODIUM 40 MG/1
40 TABLET, DELAYED RELEASE ORAL
Status: DISCONTINUED | OUTPATIENT
Start: 2022-12-02 | End: 2022-12-03 | Stop reason: HOSPADM

## 2022-12-02 RX ORDER — VORICONAZOLE 200 MG/1
200 TABLET, FILM COATED ORAL 2 TIMES DAILY
Status: DISCONTINUED | OUTPATIENT
Start: 2022-12-02 | End: 2022-12-03 | Stop reason: HOSPADM

## 2022-12-02 RX ORDER — OMEGA-3-ACID ETHYL ESTERS 1 G/1
2 CAPSULE, LIQUID FILLED ORAL 2 TIMES DAILY
Status: DISCONTINUED | OUTPATIENT
Start: 2022-12-02 | End: 2022-12-03 | Stop reason: HOSPADM

## 2022-12-02 RX ORDER — LEVOTHYROXINE SODIUM 50 UG/1
50 TABLET ORAL
Status: DISCONTINUED | OUTPATIENT
Start: 2022-12-03 | End: 2022-12-03

## 2022-12-02 RX ORDER — MEXILETINE HYDROCHLORIDE 250 MG/1
250 CAPSULE ORAL EVERY 8 HOURS
Status: DISCONTINUED | OUTPATIENT
Start: 2022-12-02 | End: 2022-12-03 | Stop reason: HOSPADM

## 2022-12-02 RX ORDER — FERROUS GLUCONATE 324(37.5)
324 TABLET ORAL 2 TIMES DAILY WITH MEALS
Status: DISCONTINUED | OUTPATIENT
Start: 2022-12-02 | End: 2022-12-03 | Stop reason: HOSPADM

## 2022-12-02 RX ADMIN — ERTAPENEM 1 G: 1 INJECTION INTRAMUSCULAR; INTRAVENOUS at 02:12

## 2022-12-02 RX ADMIN — MAGNESIUM OXIDE TAB 400 MG (241.3 MG ELEMENTAL MG) 400 MG: 400 (241.3 MG) TAB at 09:12

## 2022-12-02 RX ADMIN — VORICONAZOLE 200 MG: 200 TABLET, FILM COATED ORAL at 09:12

## 2022-12-02 RX ADMIN — ALPRAZOLAM 0.5 MG: 0.5 TABLET ORAL at 05:12

## 2022-12-02 RX ADMIN — MEXILETINE HYDROCHLORIDE 250 MG: 250 CAPSULE ORAL at 02:12

## 2022-12-02 RX ADMIN — OMEGA-3-ACID ETHYL ESTERS 2 G: 1 CAPSULE, LIQUID FILLED ORAL at 09:12

## 2022-12-02 RX ADMIN — Medication 324 MG: at 05:12

## 2022-12-02 RX ADMIN — PANTOPRAZOLE SODIUM 40 MG: 40 TABLET, DELAYED RELEASE ORAL at 01:12

## 2022-12-02 RX ADMIN — MEXILETINE HYDROCHLORIDE 250 MG: 250 CAPSULE ORAL at 09:12

## 2022-12-02 RX ADMIN — MIRTAZAPINE 30 MG: 15 TABLET, FILM COATED ORAL at 09:12

## 2022-12-02 NOTE — CONSULTS
[facility] - Cardiology Stepdown  Infectious Disease  Consult Note    Patient Name: Tim Richards  MRN: [id number]  Admission Date: 12/2/2022  Hospital Length of Stay: 0 days  Attending Physician: Gaurav Rodriguez MD  Primary Care Provider: Diego Daniel MD     Isolation Status: No active isolations    Patient information was obtained from patient and ER records.      Inpatient consult to Infectious Diseases  Consult performed by: Harry Gustafson DO  Consult ordered by: Maira Crum NP        Assessment/Plan:     Pseudoaneurysm of right femoral artery  58 yo male with LVAD in 2018, AV repair, redo sternotomy, explant of prior LVAD and Va-ECMO (decannulated 7/19) with takeback for mediastinal washout/hematoma, sternal closure, creation of neopericardium, Kleb aerogenes VAP s/p treatment admitted in September 2022 for bleeding from prior ECMO cannula site. Patient at the time reported that he was seen in HTS clinic on 9/22 and was noted to have purulent drainage. He was given a course of doxycycline. Admission in September notable for CTA with new dilatation of right femoral artery in the region of prior soft tissue density in R groin from prior ECMO cannulation site. Patient taken to OR for R groin exploration, creation of R ileofem bypass (end-end) with dacron graft and creation of muscle flap. OR cx with ESBL Ecoli (R fem graft/groin wound), Ecoli (Fem tissue groin wound), pan sensitive Proteus mirabolis (Right groin wound), and Citrobacter farmeri (R fem artery). Following discharge, groin culture also grew Aspergillus flavus and patient was started on PO voriconazole. He now presents from home for direct admission after US right groin 12/1 that showed right inguinal subcutaneous cystic structure, favored to represent a seroma or lymphocele. IR has seen patient and will plan for drain in groin collection. Blood and DLES cultures in process.     Recommendations:  --Continue IV ertapenem and PO voriconazole for  previous polymicrobial right groin infection   --Follow DLES and blood cultures  --Appreciate IR input; will follow for cultures from right groin fluid collection   --Monitor fever curve       Thank you for your consult. I will follow-up with patient. Please contact us if you have any additional questions.    Harry Gustafson, DO  Infectious Disease  [facility]    Subjective:     Principal Problem: Mycotic aneurysm    HPI: This is a 58 yo male with DCM s/p HM3 2018 with September admission for bleeding/drainage around prior R groin ECMO cannula site and found to have polymicrobial infected pseudoaneurysm/mycotic aneurysm (s/p explant of prior graft with creation of ileofem bypass with rif soaked dacron graft/muscle flap on 9/27) subsequently kept on IV ertapenem for planned 6 weeks (ina 11/9) due to isolated ESBL E coli on 9/27 along with pan sensitive Proteus mirabilis from groin surgical site. When groin was found to be swollen during ID clinic visit, ertapenem course was extended. CTA during September admission reported new dilatation of R femoral artery in the region of prior soft tissue density in R groin from prior ECMO cannulation site. Infected pseudoaneurysm/mycotic aneurysm diagnosed in setting of purulent drainage over prior cannulation site. Blcx at the time were unrevealing. S/p OR with vascular/plastics for R groin exploration, creation of R ileofem bypass (end-end) with dacron graft and creation of muscle flap - OR cx with ESBL Ecoli (R fem graft/groin wound), Ecoli (Fem tissue groin wound), and Citrobacter farmeri (R fem artery). Following discharge, groin culture also grew Aspergillus flavus and patient was started on PO voriconazole. He now presents from home for direct admission after US right groin 12/1 that showed right inguinal subcutaneous cystic structure, favored to represent a seroma or lymphocele. Patient says the groin has had drainage from area of prior surgery but DLES  "has been dry. However, DLES cultures were collected on 12/1. IR has been consulted and will place right groin collection drain. Blood cultures in process. Infectious Diseases being consulted for "polymicrobial infected pseudoaneurysm/mycotic aneurysm right groin."      Past Medical History:   Diagnosis Date    A-fib     Anticoagulant long-term use     Atrial flutter 7/30/2022    CHF (congestive heart failure)     Class 1 obesity due to excess calories with serious comorbidity and body mass index (BMI) of 31.0 to 31.9 in adult     Class 1 obesity due to excess calories with serious comorbidity and body mass index (BMI) of 32.0 to 32.9 in adult     Dilated cardiomyopathy 1/10/2018    Disorder of kidney and ureter     CKD    Encounter for blood transfusion     Essential hypertension 8/28/2022    Gout     HTN (hypertension)     Hx of psychiatric care     ICD (implantable cardioverter-defibrillator) infection 7/1/2020    Psychiatric problem     Thyroid disease     Ventricular tachycardia (paroxysmal)        Past Surgical History:   Procedure Laterality Date    AORTIC VALVULOPLASTY N/A 7/13/2022    Procedure: REPAIR, AORTIC VALVE;  Surgeon: Yg Kaufman MD;  [facility];  Service: Cardiovascular;  Laterality: N/A;    APPLICATION OF WOUND VACUUM-ASSISTED CLOSURE DEVICE N/A 7/15/2022    Procedure: APPLICATION, WOUND VAC;  Surgeon: Yg Kaufman MD;  [facility];  Service: Cardiovascular;  Laterality: N/A;  50 x 5 cm     APPLICATION OF WOUND VACUUM-ASSISTED CLOSURE DEVICE Right 9/27/2022    Procedure: APPLICATION, WOUND VAC;  Surgeon: Kole Tabares MD;  [facility];  Service: Plastics;  Laterality: Right;    CARDIAC CATHETERIZATION  Dec. 2012    CARDIAC DEFIBRILLATOR PLACEMENT Left     CRRT-D    COLONOSCOPY N/A 3/6/2018    Procedure: COLONOSCOPY;  Surgeon: Alonso Bone MD;  [facility]  Service: Endoscopy;  Laterality: N/A;    " COLONOSCOPY N/A 7/17/2019    Procedure: COLONOSCOPY;  Surgeon: Blane Valdez MD;  Location: [facility] ENDO (2ND FLR);  Service: Endoscopy;  Laterality: N/A;    COLONOSCOPY N/A 7/18/2019    Procedure: COLONOSCOPY;  Surgeon: Blane Valdez MD;  Location: [facility] ENDO (2ND FLR);  Service: Endoscopy;  Laterality: N/A;    CREATION OF ILIOFEMORAL ARTERY BYPASS Right 9/27/2022    Procedure: CREATION, BYPASS, ARTERIAL, ILIAC TO FEMORAL WITH GRAFT;  Surgeon: Zach Hernández MD;  [facility];  Service: Peripheral Vascular;  Laterality: Right;    CREATION OF MUSCLE ROTATIONAL FLAP Right 9/27/2022    Procedure: CREATION, FLAP, MUSCLE ROTATION, SARTORIUS AND RECTUS FEMORIS;  Surgeon: Kole Tabares MD;  [facility];  Service: Plastics;  Laterality: Right;    ESOPHAGOGASTRODUODENOSCOPY N/A 7/17/2019    Procedure: EGD (ESOPHAGOGASTRODUODENOSCOPY);  Surgeon: Blane Valdez MD;  Location: [facility] ENDO (2ND FLR);  Service: Endoscopy;  Laterality: N/A;    ESOPHAGOGASTRODUODENOSCOPY N/A 7/18/2019    Procedure: EGD (ESOPHAGOGASTRODUODENOSCOPY);  Surgeon: Blane Valdez MD;  Location: [facility] ENDO (2ND FLR);  Service: Endoscopy;  Laterality: N/A;    FOREIGN BODY REMOVAL N/A 7/22/2022    Procedure: REMOVAL, FOREIGN BODY;  Surgeon: Yg Kaufman MD;  [facility];  Service: Cardiovascular;  Laterality: N/A;  LVAD Heartmate 2 drive line removal    INSERTION OF GRAFT TO PERICARDIUM N/A 7/15/2022    Procedure: INSERTION, GRAFT, PERICARDIUM;  Surgeon: Yg Kaufman MD;  [facility];  Service: Cardiovascular;  Laterality: N/A;    IRRIGATION OF MEDIASTINUM N/A 7/15/2022    Procedure: IRRIGATION, MEDIASTINUM;  Surgeon: Yg Kaufman MD;  [facility];  Service: Cardiovascular;  Laterality: N/A;    LYSIS OF ADHESIONS  7/13/2022    Procedure: LYSIS, ADHESIONS;  Surgeon: Yg Kaufman MD;  Location: [facility] OR [address] [place] [address];  Service: Cardiovascular;;    NONINVASIVE CARDIAC ELECTROPHYSIOLOGY  STUDY N/A 10/18/2019    Procedure: CARDIAC ELECTROPHYSIOLOGY STUDY, NONINVASIVE;  Surgeon: Raz Wagner MD;  Location: [facility] EP LAB;  Service: Cardiology;  Laterality: N/A;  VT, DFTs, MDT CRTD in situ, LVAD, juarez MB, 3098    REPLACEMENT OF IMPLANTABLE CARDIOVERTER-DEFIBRILLATOR (ICD) GENERATOR N/A 3/9/2020    Procedure: REPLACEMENT, ICD GENERATOR;  Surgeon: Harry Yun MD;  Location: [facility] EP LAB;  Service: Cardiology;  Laterality: N/A;  VT, ICD Gen Change and Lead Revision, MDT, MAC, DM,3 Prep    REPLACEMENT OF LEFT VENTRICULAR ASSIST DEVICE (LVAD)  7/13/2022    Procedure: REPLACEMENT, LVAD;  Surgeon: Yg Kaufman MD;  Location: [facility] OR [address] [place] [address];  Service: Cardiovascular;;    REPLACEMENT OF PUMP N/A 7/13/2022    Procedure: REPLACEMENT, PUMP;  Surgeon: Yg Kaufman MD;  [facility];  Service: Cardiovascular;  Laterality: N/A;  LVAD pump exchange  EXPLANATION OF HEATMATE 2  IMPLANTATION OF HEARTMATE 3  IMPLANTATION OF 8MM CHIMNEY GRAFT TO RFA  INITIATION OF ECMO  TEMPORARY CLOSURE OF CHEST    REVISION OF IMPLANTABLE CARDIOVERTER-DEFIBRILLATOR (ICD) ELECTRODE LEAD PLACEMENT N/A 3/9/2020    Procedure: REVISION, INSERTION, ELECTRODE LEAD, ICD;  Surgeon: Harry Yun MD;  Location: [facility] EP LAB;  Service: Cardiology;  Laterality: N/A;  VT, ICD Gen Change and Lead Revision, MDT, MAC, DM,3 Prep    STERNAL WOUND CLOSURE N/A 7/14/2022    Procedure: CLOSURE, WOUND, STERNUM;  Surgeon: Yg Kaufman MD;  [facility];  Service: Cardiovascular;  Laterality: N/A;  temporary closure  evacuation of hematoma    STERNAL WOUND CLOSURE N/A 7/15/2022    Procedure: CLOSURE, WOUND, STERNUM;  Surgeon: Yg Kaufman MD;  Location: [facility] OR [address] [place] [address];  Service: Cardiovascular;  Laterality: N/A;    TRACHEOSTOMY N/A 8/4/2022    Procedure: CREATION, TRACHEOSTOMY;  Surgeon: Germain Holt MD;  Location: [facility] OR [address] [place] [address];  Service: General;  Laterality: N/A;    TREATMENT OF CARDIAC ARRHYTHMIA   10/18/2019    Procedure: Cardioversion or Defibrillation;  Surgeon: Raz Wagner MD;  Location: [facility] EP LAB;  Service: Cardiology;;       Review of patient's allergies indicates:   Allergen Reactions    Lisinopril Anaphylaxis    Hydralazine analogues      Chronic constipation, impotence, dizziness       Medications:  Medications Prior to Admission   Medication Sig    ALPRAZolam (XANAX) 1 MG tablet Take 0.5-1 tablets (0.5-1 mg total) by mouth 2 (two) times daily as needed for Anxiety.    amiodarone (PACERONE) 200 MG Tab Take 2 tablets (400 mg total) by mouth once daily.    ferrous gluconate (FERGON) 324 MG tablet Take 1 tablet (324 mg total) by mouth 2 (two) times daily with meals.    levothyroxine (SYNTHROID) 50 MCG tablet Take 1 tablet (50 mcg total) by mouth before breakfast.    magnesium oxide (MAG-OX) 400 mg (241.3 mg magnesium) tablet Take 1 tablet (400 mg total) by mouth 2 (two) times daily.    mexiletine (MEXITIL) 250 MG Cap Take 1 capsule (250 mg total) by mouth every 8 (eight) hours.    mirtazapine (REMERON) 30 MG tablet Take 1 tablet (30 mg total) by mouth every evening.    omega-3 acid ethyl esters (LOVAZA) 1 gram capsule Take 2 capsules (2 g total) by mouth 2 (two) times daily.    pantoprazole (PROTONIX) 40 MG tablet Take 1 tablet (40 mg total) by mouth daily with lunch.    sodium chloride 0.9% SolP 100 mL with ertapenem 1 gram SolR 1 g Inject 1 g into the vein once daily.    voriconazole (VFEND) 200 MG Tab Take 1 tablet (200 mg total) by mouth 2 (two) times daily. Take 400 mg (2 tabs) by mouth twice a day for 1 day then 200 mg (1 tab) twice a day    warfarin (COUMADIN) 5 MG tablet Take a half tablet (2.5mg) by mouth on 10/5 only. Then take 1 tablet (5mg) daily     Antibiotics (From admission, onward)      Start     Stop Route Frequency Ordered    12/02/22 1300  ertapenem (INVANZ) 1 g in sodium chloride 0.9% 100 mL IVPB         -- IV Daily 12/02/22 1207          Antifungals (From  admission, onward)      Start     Stop Route Frequency Ordered    22 1300  voriconazole tablet 200 mg         -- Oral 2 times daily 22 1207          Antivirals (From admission, onward)      None             Immunization History   Administered Date(s) Administered    COVID-19, MRNA, LN-S, PF (MODERNA FULL 0.5 ML DOSE) 2021, 2021    COVID-19, MRNA, LN-S, PF (Pfizer) (Purple Cap) 2021    Hepatitis A / Hepatitis B 2018    Influenza 2014, 2019    Influenza - Quadrivalent - PF *Preferred* (6 months and older) 2015, 2016, 10/05/2017, 2021    Influenza - Trivalent - PF (PED) 2014    Pneumococcal Conjugate - 13 Valent 2014    Pneumococcal Polysaccharide - 23 Valent 10/01/2015, 2016    Tdap 2018       Family History       Problem Relation (Age of Onset)    Cancer Sister (54)    Coronary artery disease Father    Diabetes Father    Heart disease Father    Hypertension Father    No Known Problems Mother, Brother          Social History     Socioeconomic History    Marital status:     Number of children: 3   Occupational History     Employer: [employer]    Tobacco Use    Smoking status: Former     Packs/day: 1.00     Years: 31.00     Pack years: 31.00     Types: Cigarettes     Quit date: 2018     Years since quittin.8    Smokeless tobacco: Former    Tobacco comments:     pt is quiting on his own - pt stated not qualified for program;  pt  quit on his own   Substance and Sexual Activity    Alcohol use: No     Alcohol/week: 0.0 standard drinks     Comment: quit    Drug use: No    Sexual activity: Yes     Partners: Female     Birth control/protection: None     Comment: 10/5/17  with same partner 7 years    Social History Narrative    Disabled     Social Determinants of Health     Financial Resource Strain: Low Risk     Difficulty of Paying Living Expenses: Not very hard   Food Insecurity:  Unknown    Worried About Running Out of Food in the Last Year: Patient refused    Ran Out of Food in the Last Year: Patient refused   Transportation Needs: Unknown    Lack of Transportation (Medical): Patient refused    Lack of Transportation (Non-Medical): Patient refused   Physical Activity: Insufficiently Active    Days of Exercise per Week: 3 days    Minutes of Exercise per Session: 10 min   Stress: Stress Concern Present    Feeling of Stress : To some extent   Social Connections: Unknown    Frequency of Communication with Friends and Family: Patient refused    Frequency of Social Gatherings with Friends and Family: Patient refused    Active Member of Clubs or Organizations: No    Attends Club or Organization Meetings: Patient refused    Marital Status:    Housing Stability: Unknown    Unable to Pay for Housing in the Last Year: Patient refused    Unstable Housing in the Last Year: Patient refused     Review of Systems   Constitutional:  Negative for chills, fatigue, fever and unexpected weight change.   HENT:  Negative for congestion, postnasal drip and trouble swallowing.    Respiratory:  Negative for cough, chest tightness and shortness of breath.    Cardiovascular:  Negative for chest pain, palpitations and leg swelling.   Gastrointestinal:  Negative for abdominal pain, blood in stool, constipation, diarrhea, nausea and vomiting.   Genitourinary:  Negative for difficulty urinating, dysuria and hematuria.   Musculoskeletal:  Negative for arthralgias and back pain.   Skin:  Positive for color change and wound.        Right groin pain and swelling    Neurological:  Negative for dizziness and light-headedness.   Psychiatric/Behavioral:  Negative for confusion.    Objective:     Vital Signs (Most Recent):  Temp: 98.6 °F (37 °C) (12/02/22 1125)  Pulse: 69 (12/02/22 1129)  Resp: 18 (12/02/22 1125)  BP: (!) 80/0 (12/02/22 1125)  SpO2: (!) 92 % (12/02/22 1125)   Vital Signs (24h Range):  Temp:   [98.6 °F (37 °C)] 98.6 °F (37 °C)  Pulse:  [69-82] 69  Resp:  [18] 18  SpO2:  [92 %] 92 %  BP: (80)/(0) 80/0        There is no height or weight on file to calculate BMI.    CrCl cannot be calculated (Unknown ideal weight.).    Physical Exam  Constitutional:       General: He is not in acute distress.     Appearance: Normal appearance. He is not ill-appearing.   Cardiovascular:      Rate and Rhythm: Normal rate and regular rhythm.      Pulses: Normal pulses.      Heart sounds: Normal heart sounds. No murmur heard.    No friction rub. No gallop.   Pulmonary:      Effort: Pulmonary effort is normal. No respiratory distress.      Breath sounds: Normal breath sounds.   Abdominal:      General: Abdomen is flat. Bowel sounds are normal. There is no distension.      Palpations: Abdomen is soft.      Tenderness: There is no abdominal tenderness.      Comments: Left sided DLES bandaged and dry with no surrounding tenderness   Skin:     General: Skin is warm and dry.      Comments: Right groin surgical site with mild serous drainage and tender to palpation around area of edema    Neurological:      Mental Status: He is alert.       Significant Labs: All pertinent labs within the past 24 hours have been reviewed.    Significant Imaging: I have reviewed all pertinent imaging results/findings within the past 24 hours.

## 2022-12-02 NOTE — ASSESSMENT & PLAN NOTE
58 yo male with LVAD in 2018, AV repair, redo sternotomy, explant of prior LVAD and Va-ECMO (decannulated 7/19) with takeback for mediastinal washout/hematoma, sternal closure, creation of neopericardium, Kleb aerogenes VAP s/p treatment admitted in September 2022 for bleeding from prior ECMO cannula site. Patient at the time reported that he was seen in HTS clinic on 9/22 and was noted to have purulent drainage. He was given a course of doxycycline. Admission in September notable for CTA with new dilatation of right femoral artery in the region of prior soft tissue density in R groin from prior ECMO cannulation site. Patient taken to OR for R groin exploration, creation of R ileofem bypass (end-end) with dacron graft and creation of muscle flap. OR cx with ESBL Ecoli (R fem graft/groin wound), Ecoli (Fem tissue groin wound), pan sensitive Proteus mirabolis (Right groin wound), and Citrobacter farmeri (R fem artery). Following discharge, groin culture also grew Aspergillus flavus and patient was started on PO voriconazole. He now presents from home for direct admission after US right groin 12/1 that showed right inguinal subcutaneous cystic structure, favored to represent a seroma or lymphocele. IR has seen patient and will plan for drain in groin collection. Blood and DLES cultures in process.     Recommendations:  --Continue IV ertapenem and PO voriconazole for previous polymicrobial right groin infection   --Follow DLES and blood cultures  --Appreciate IR input; will follow for cultures from right groin fluid collection   --Monitor fever curve

## 2022-12-02 NOTE — SUBJECTIVE & OBJECTIVE
Past Medical History:   Diagnosis Date    A-fib     Anticoagulant long-term use     Atrial flutter 7/30/2022    CHF (congestive heart failure)     Class 1 obesity due to excess calories with serious comorbidity and body mass index (BMI) of 31.0 to 31.9 in adult     Class 1 obesity due to excess calories with serious comorbidity and body mass index (BMI) of 32.0 to 32.9 in adult     Dilated cardiomyopathy 1/10/2018    Disorder of kidney and ureter     CKD    Encounter for blood transfusion     Essential hypertension 8/28/2022    Gout     HTN (hypertension)     Hx of psychiatric care     ICD (implantable cardioverter-defibrillator) infection 7/1/2020    Psychiatric problem     Thyroid disease     Ventricular tachycardia (paroxysmal)        Past Surgical History:   Procedure Laterality Date    AORTIC VALVULOPLASTY N/A 7/13/2022    Procedure: REPAIR, AORTIC VALVE;  Surgeon: Yg Kaufman MD;  Location: [facility] OR [address] [place] [address];  Service: Cardiovascular;  Laterality: N/A;    APPLICATION OF WOUND VACUUM-ASSISTED CLOSURE DEVICE N/A 7/15/2022    Procedure: APPLICATION, WOUND VAC;  Surgeon: Yg Kaufman MD;  [facility];  Service: Cardiovascular;  Laterality: N/A;  50 x 5 cm     APPLICATION OF WOUND VACUUM-ASSISTED CLOSURE DEVICE Right 9/27/2022    Procedure: APPLICATION, WOUND VAC;  Surgeon: Kole Tabares MD;  Location: [facility] OR [address] [place] [address];  Service: Plastics;  Laterality: Right;    CARDIAC CATHETERIZATION  Dec. 2012    CARDIAC DEFIBRILLATOR PLACEMENT Left     CRRT-D    COLONOSCOPY N/A 3/6/2018    Procedure: COLONOSCOPY;  Surgeon: Alonso Bone MD;  Location: [facility] ([address] [place] [address]);  Service: Endoscopy;  Laterality: N/A;    COLONOSCOPY N/A 7/17/2019    Procedure: COLONOSCOPY;  Surgeon: Blane Valdez MD;  Location: [facility] ENDO ([facility]);  Service: Endoscopy;  Laterality: N/A;    COLONOSCOPY N/A 7/18/2019    Procedure: COLONOSCOPY;  Surgeon: Blane Valdez MD;  Location: [facility] ENDO ([address] [place] [address]);  Service: Endoscopy;   Laterality: N/A;    CREATION OF ILIOFEMORAL ARTERY BYPASS Right 9/27/2022    Procedure: CREATION, BYPASS, ARTERIAL, ILIAC TO FEMORAL WITH GRAFT;  Surgeon: Zach Hernández MD;  Location: [facility] OR [facility] FLR;  Service: Peripheral Vascular;  Laterality: Right;    CREATION OF MUSCLE ROTATIONAL FLAP Right 9/27/2022    Procedure: CREATION, FLAP, MUSCLE ROTATION, SARTORIUS AND RECTUS FEMORIS;  Surgeon: Kole Tabares MD;  Location: [facility] OR [facility] FLR;  Service: Plastics;  Laterality: Right;    ESOPHAGOGASTRODUODENOSCOPY N/A 7/17/2019    Procedure: EGD (ESOPHAGOGASTRODUODENOSCOPY);  Surgeon: Blane Valdez MD;  Location: [facility] ENDO (2ND FLR);  Service: Endoscopy;  Laterality: N/A;    ESOPHAGOGASTRODUODENOSCOPY N/A 7/18/2019    Procedure: EGD (ESOPHAGOGASTRODUODENOSCOPY);  Surgeon: Blane Valdez MD;  Location: [facility] ENDO (2ND FLR);  Service: Endoscopy;  Laterality: N/A;    FOREIGN BODY REMOVAL N/A 7/22/2022    Procedure: REMOVAL, FOREIGN BODY;  Surgeon: Yg Kaufman MD;  Location: [facility] OR [facility] FLR;  Service: Cardiovascular;  Laterality: N/A;  LVAD Heartmate 2 drive line removal    INSERTION OF GRAFT TO PERICARDIUM N/A 7/15/2022    Procedure: INSERTION, GRAFT, PERICARDIUM;  Surgeon: Yg Kaufman MD;  [facility];  Service: Cardiovascular;  Laterality: N/A;    IRRIGATION OF MEDIASTINUM N/A 7/15/2022    Procedure: IRRIGATION, MEDIASTINUM;  Surgeon: Yg Kaufman MD;  [facility];  Service: Cardiovascular;  Laterality: N/A;    LYSIS OF ADHESIONS  7/13/2022    Procedure: LYSIS, ADHESIONS;  Surgeon: Yg Kaufman MD;  Location: [facility] OR [facility] FLR;  Service: Cardiovascular;;    NONINVASIVE CARDIAC ELECTROPHYSIOLOGY STUDY N/A 10/18/2019    Procedure: CARDIAC ELECTROPHYSIOLOGY STUDY, NONINVASIVE;  Surgeon: Raz Wagner MD;  Location: [facility] EP LAB;  Service: Cardiology;  Laterality: N/A;  VT, DFTs, MDT CRTD in situ, LVAD, anes, MB, 3098    REPLACEMENT OF IMPLANTABLE CARDIOVERTER-DEFIBRILLATOR (ICD)  GENERATOR N/A 3/9/2020    Procedure: REPLACEMENT, ICD GENERATOR;  Surgeon: Harry Yun MD;  Location: [facility] EP LAB;  Service: Cardiology;  Laterality: N/A;  VT, ICD Gen Change and Lead Revision, MDT, MAC, DM,3 Prep    REPLACEMENT OF LEFT VENTRICULAR ASSIST DEVICE (LVAD)  7/13/2022    Procedure: REPLACEMENT, LVAD;  Surgeon: Yg Kaufman MD;  [facility];  Service: Cardiovascular;;    REPLACEMENT OF PUMP N/A 7/13/2022    Procedure: REPLACEMENT, PUMP;  Surgeon: Yg Kaufman MD;  [facility];  Service: Cardiovascular;  Laterality: N/A;  LVAD pump exchange  EXPLANATION OF HEATMATE 2  IMPLANTATION OF HEARTMATE 3  IMPLANTATION OF 8MM CHIMNEY GRAFT TO RFA  INITIATION OF ECMO  TEMPORARY CLOSURE OF CHEST    REVISION OF IMPLANTABLE CARDIOVERTER-DEFIBRILLATOR (ICD) ELECTRODE LEAD PLACEMENT N/A 3/9/2020    Procedure: REVISION, INSERTION, ELECTRODE LEAD, ICD;  Surgeon: Harry Yun MD;  Location: [facility] EP LAB;  Service: Cardiology;  Laterality: N/A;  VT, ICD Gen Change and Lead Revision, MDT, MAC, DM,3 Prep    STERNAL WOUND CLOSURE N/A 7/14/2022    Procedure: CLOSURE, WOUND, STERNUM;  Surgeon: Yg Kaufman MD;  [facility];  Service: Cardiovascular;  Laterality: N/A;  temporary closure  evacuation of hematoma    STERNAL WOUND CLOSURE N/A 7/15/2022    Procedure: CLOSURE, WOUND, STERNUM;  Surgeon: Yg Kaufman MD;  [facility];  Service: Cardiovascular;  Laterality: N/A;    TRACHEOSTOMY N/A 8/4/2022    Procedure: CREATION, TRACHEOSTOMY;  Surgeon: Germain Holt MD;  [facility];  Service: General;  Laterality: N/A;    TREATMENT OF CARDIAC ARRHYTHMIA  10/18/2019    Procedure: Cardioversion or Defibrillation;  Surgeon: Raz Wagner MD;  Location: [facility] EP LAB;  Service: Cardiology;;       Review of patient's allergies indicates:   Allergen Reactions    Lisinopril Anaphylaxis    Hydralazine analogues      Chronic constipation, impotence,  dizziness       Medications:  Medications Prior to Admission   Medication Sig    ALPRAZolam (XANAX) 1 MG tablet Take 0.5-1 tablets (0.5-1 mg total) by mouth 2 (two) times daily as needed for Anxiety.    amiodarone (PACERONE) 200 MG Tab Take 2 tablets (400 mg total) by mouth once daily.    ferrous gluconate (FERGON) 324 MG tablet Take 1 tablet (324 mg total) by mouth 2 (two) times daily with meals.    levothyroxine (SYNTHROID) 50 MCG tablet Take 1 tablet (50 mcg total) by mouth before breakfast.    magnesium oxide (MAG-OX) 400 mg (241.3 mg magnesium) tablet Take 1 tablet (400 mg total) by mouth 2 (two) times daily.    mexiletine (MEXITIL) 250 MG Cap Take 1 capsule (250 mg total) by mouth every 8 (eight) hours.    mirtazapine (REMERON) 30 MG tablet Take 1 tablet (30 mg total) by mouth every evening.    omega-3 acid ethyl esters (LOVAZA) 1 gram capsule Take 2 capsules (2 g total) by mouth 2 (two) times daily.    pantoprazole (PROTONIX) 40 MG tablet Take 1 tablet (40 mg total) by mouth daily with lunch.    sodium chloride 0.9% SolP 100 mL with ertapenem 1 gram SolR 1 g Inject 1 g into the vein once daily.    voriconazole (VFEND) 200 MG Tab Take 1 tablet (200 mg total) by mouth 2 (two) times daily. Take 400 mg (2 tabs) by mouth twice a day for 1 day then 200 mg (1 tab) twice a day    warfarin (COUMADIN) 5 MG tablet Take a half tablet (2.5mg) by mouth on 10/5 only. Then take 1 tablet (5mg) daily     Antibiotics (From admission, onward)      Start     Stop Route Frequency Ordered    12/02/22 1300  ertapenem (INVANZ) 1 g in sodium chloride 0.9% 100 mL IVPB         -- IV Daily 12/02/22 1207          Antifungals (From admission, onward)      Start     Stop Route Frequency Ordered    12/02/22 1300  voriconazole tablet 200 mg         -- Oral 2 times daily 12/02/22 1207          Antivirals (From admission, onward)      None             Immunization History   Administered Date(s) Administered    COVID-19, MRNA, LN-S, PF (MODERNA  FULL 0.5 ML DOSE) 2021, 2021    COVID-19, MRNA, LN-S, PF (Pfizer) (Purple Cap) 2021    Hepatitis A / Hepatitis B 2018    Influenza 2014, 2019    Influenza - Quadrivalent - PF *Preferred* (6 months and older) 2015, 2016, 10/05/2017, 2021    Influenza - Trivalent - PF (PED) 2014    Pneumococcal Conjugate - 13 Valent 2014    Pneumococcal Polysaccharide - 23 Valent 10/01/2015, 2016    Tdap 2018       Family History       Problem Relation (Age of Onset)    Cancer Sister (54)    Coronary artery disease Father    Diabetes Father    Heart disease Father    Hypertension Father    No Known Problems Mother, Brother          Social History     Socioeconomic History    Marital status:     Number of children: 3   Occupational History     Employer: [employer]    Tobacco Use    Smoking status: Former     Packs/day: 1.00     Years: 31.00     Pack years: 31.00     Types: Cigarettes     Quit date: 2018     Years since quittin.8    Smokeless tobacco: Former    Tobacco comments:     pt is quiting on his own - pt stated not qualified for program;  pt  quit on his own   Substance and Sexual Activity    Alcohol use: No     Alcohol/week: 0.0 standard drinks     Comment: quit    Drug use: No    Sexual activity: Yes     Partners: Female     Birth control/protection: None     Comment: 10/5/17  with same partner 7 years    Social History Narrative    Disabled     Social Determinants of Health     Financial Resource Strain: Low Risk     Difficulty of Paying Living Expenses: Not very hard   Food Insecurity: Unknown    Worried About Running Out of Food in the Last Year: Patient refused    Ran Out of Food in the Last Year: Patient refused   Transportation Needs: Unknown    Lack of Transportation (Medical): Patient refused    Lack of Transportation (Non-Medical): Patient refused   Physical Activity: Insufficiently Active    Days of Exercise  per Week: 3 days    Minutes of Exercise per Session: 10 min   Stress: Stress Concern Present    Feeling of Stress : To some extent   Social Connections: Unknown    Frequency of Communication with Friends and Family: Patient refused    Frequency of Social Gatherings with Friends and Family: Patient refused    Active Member of Clubs or Organizations: No    Attends Club or Organization Meetings: Patient refused    Marital Status:    Housing Stability: Unknown    Unable to Pay for Housing in the Last Year: Patient refused    Unstable Housing in the Last Year: Patient refused     Review of Systems   Constitutional:  Negative for chills, fatigue, fever and unexpected weight change.   HENT:  Negative for congestion, postnasal drip and trouble swallowing.    Respiratory:  Negative for cough, chest tightness and shortness of breath.    Cardiovascular:  Negative for chest pain, palpitations and leg swelling.   Gastrointestinal:  Negative for abdominal pain, blood in stool, constipation, diarrhea, nausea and vomiting.   Genitourinary:  Negative for difficulty urinating, dysuria and hematuria.   Musculoskeletal:  Negative for arthralgias and back pain.   Skin:  Positive for color change and wound.        Right groin pain and swelling    Neurological:  Negative for dizziness and light-headedness.   Psychiatric/Behavioral:  Negative for confusion.    Objective:     Vital Signs (Most Recent):  Temp: 98.6 °F (37 °C) (12/02/22 1125)  Pulse: 69 (12/02/22 1129)  Resp: 18 (12/02/22 1125)  BP: (!) 80/0 (12/02/22 1125)  SpO2: (!) 92 % (12/02/22 1125)   Vital Signs (24h Range):  Temp:  [98.6 °F (37 °C)] 98.6 °F (37 °C)  Pulse:  [69-82] 69  Resp:  [18] 18  SpO2:  [92 %] 92 %  BP: (80)/(0) 80/0        There is no height or weight on file to calculate BMI.    CrCl cannot be calculated (Unknown ideal weight.).    Physical Exam  Constitutional:       General: He is not in acute distress.     Appearance: Normal appearance. He is not  ill-appearing.   Cardiovascular:      Rate and Rhythm: Normal rate and regular rhythm.      Pulses: Normal pulses.      Heart sounds: Normal heart sounds. No murmur heard.    No friction rub. No gallop.   Pulmonary:      Effort: Pulmonary effort is normal. No respiratory distress.      Breath sounds: Normal breath sounds.   Abdominal:      General: Abdomen is flat. Bowel sounds are normal. There is no distension.      Palpations: Abdomen is soft.      Tenderness: There is no abdominal tenderness.      Comments: Left sided DLES bandaged and dry with no surrounding tenderness   Skin:     General: Skin is warm and dry.      Comments: Right groin surgical site with mild serous drainage and tender to palpation around area of edema    Neurological:      Mental Status: He is alert.       Significant Labs: All pertinent labs within the past 24 hours have been reviewed.    Significant Imaging: I have reviewed all pertinent imaging results/findings within the past 24 hours.

## 2022-12-02 NOTE — CARE UPDATE
Vascular Surgery Care Update    Patient seen and examined, imaging reviewed.    Subcutaneous fluid collection at right groin, present since initial muscle flap creation per patient. No flow, compressibility, or evidence of arterial involvement. Clearly separate and lateral to graft on imaging. No signs of infection on exam, no fevers per patient.     No vascular surgery involvement indications at this time.    Would first consult plastics surgery to evaluate before proceeding with any intervention.     Jessica Maher MD  PGY-3, General Surgery  [facility]

## 2022-12-02 NOTE — HPI
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 When groin was found to be swollen during ID clinic visit, ertapenem course was extended. CTA during September admission reported new dilatation of R femoral artery in the region of prior soft tissue density in R groin from prior ECMO cannulation site. Infected pseudoaneurysm/mycotic aneurysm diagnosed in setting of purulent drainage over prior cannulation site. Blcx at the time were unrevealing. S/p OR with vascular/plastics for R groin exploration, creation of R ileofem bypass (end-end) with dacron graft and creation of muscle flap - OR cx with ESBL Ecoli (R fem graft/groin wound), Ecoli (Fem tissue groin wound), and Citrobacter farmeri (R fem artery). Following discharge, groin culture also grew Aspergillus flavus and patient was started on PO voriconazole. He now presents from home for direct admission after US right groin 12/1 that showed right inguinal subcutaneous cystic structure, favored to represent a seroma or lymphocele. Patient says the groin has had drainage from area of prior surgery but DLES has been dry. However, DLES cultures were collected on 12/1. IR has been consulted and will place right groin collection drain. Blood cultures in process. Infectious Diseases being consulted for "polymicrobial infected pseudoaneurysm/mycotic aneurysm right groin."  "

## 2022-12-02 NOTE — CONSULTS
See H&P.       Alex Oro MD  Radiology Resident PGY- 3  [facility]   Pager: [phone]

## 2022-12-02 NOTE — PROGRESS NOTES
Patient completed an US at the end of day after his clinic visit. Results reviewed by MD Rodriguez. MD ordered for patient to come in for admission and be evaluated by ID, IR, vascular surgery, and plastics due to findings and concern for infection. Information communicated to patient who verbalized understanding.

## 2022-12-02 NOTE — H&P
Inpatient Radiology Pre-procedure Note    History of Present Illness:  Tim Richards is a 57 y.o. 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 PT developed new Rt groin swelling and US showed 5.6 cm seroma vs lymphocele with no evidence of communication with the femoral artery graft. IR consulted for evaluation.    Admission H&P reviewed.    Past Medical History:   Diagnosis Date    A-fib     Anticoagulant long-term use     Atrial flutter 7/30/2022    CHF (congestive heart failure)     Class 1 obesity due to excess calories with serious comorbidity and body mass index (BMI) of 31.0 to 31.9 in adult     Class 1 obesity due to excess calories with serious comorbidity and body mass index (BMI) of 32.0 to 32.9 in adult     Dilated cardiomyopathy 1/10/2018    Disorder of kidney and ureter     CKD    Encounter for blood transfusion     Essential hypertension 8/28/2022    Gout     HTN (hypertension)     Hx of psychiatric care     ICD (implantable cardioverter-defibrillator) infection 7/1/2020    Psychiatric problem     Thyroid disease     Ventricular tachycardia (paroxysmal)      Past Surgical History:   Procedure Laterality Date    AORTIC VALVULOPLASTY N/A 7/13/2022    Procedure: REPAIR, AORTIC VALVE;  Surgeon: Yg Kaufman MD;  Location: [facility] OR [address] [place] [address];  Service: Cardiovascular;  Laterality: N/A;    APPLICATION OF WOUND VACUUM-ASSISTED CLOSURE DEVICE N/A 7/15/2022    Procedure: APPLICATION, WOUND VAC;  Surgeon: Yg Kaufman MD;  Location: [facility] OR [address] [place] [address];  Service: Cardiovascular;  Laterality: N/A;  50 x 5 cm     APPLICATION OF WOUND VACUUM-ASSISTED CLOSURE DEVICE Right 9/27/2022    Procedure: APPLICATION, WOUND VAC;  Surgeon: Kole Tabares MD;  Location: [facility] OR [address] [place] [address];  Service: Plastics;   Laterality: Right;    CARDIAC CATHETERIZATION  Dec. 2012    CARDIAC DEFIBRILLATOR PLACEMENT Left     CRRT-D    COLONOSCOPY N/A 3/6/2018    Procedure: COLONOSCOPY;  Surgeon: Alonso Bone MD;  Location: [facility] ENDO (2ND FLR);  Service: Endoscopy;  Laterality: N/A;    COLONOSCOPY N/A 7/17/2019    Procedure: COLONOSCOPY;  Surgeon: Blane Valdez MD;  Location: [facility] ENDO (2ND FLR);  Service: Endoscopy;  Laterality: N/A;    COLONOSCOPY N/A 7/18/2019    Procedure: COLONOSCOPY;  Surgeon: Blane Valdez MD;  Location: [facility] ENDO (2ND FLR);  Service: Endoscopy;  Laterality: N/A;    CREATION OF ILIOFEMORAL ARTERY BYPASS Right 9/27/2022    Procedure: CREATION, BYPASS, ARTERIAL, ILIAC TO FEMORAL WITH GRAFT;  Surgeon: Zach Hernández MD;  [facility];  Service: Peripheral Vascular;  Laterality: Right;    CREATION OF MUSCLE ROTATIONAL FLAP Right 9/27/2022    Procedure: CREATION, FLAP, MUSCLE ROTATION, SARTORIUS AND RECTUS FEMORIS;  Surgeon: Kole Tabares MD;  [facility];  Service: Plastics;  Laterality: Right;    ESOPHAGOGASTRODUODENOSCOPY N/A 7/17/2019    Procedure: EGD (ESOPHAGOGASTRODUODENOSCOPY);  Surgeon: Blane Valdez MD;  Location: [facility] (2ND FLR);  Service: Endoscopy;  Laterality: N/A;    ESOPHAGOGASTRODUODENOSCOPY N/A 7/18/2019    Procedure: EGD (ESOPHAGOGASTRODUODENOSCOPY);  Surgeon: Blane Valdez MD;  Location: [facility] ENDO (2ND FLR);  Service: Endoscopy;  Laterality: N/A;    FOREIGN BODY REMOVAL N/A 7/22/2022    Procedure: REMOVAL, FOREIGN BODY;  Surgeon: Yg Kaufman MD;  [facility];  Service: Cardiovascular;  Laterality: N/A;  LVAD Heartmate 2 drive line removal    INSERTION OF GRAFT TO PERICARDIUM N/A 7/15/2022    Procedure: INSERTION, GRAFT, PERICARDIUM;  Surgeon: Yg Kaufman MD;  Location: [facility] OR [facility] FLR;  Service: Cardiovascular;  Laterality: N/A;    IRRIGATION OF MEDIASTINUM N/A 7/15/2022    Procedure: IRRIGATION, MEDIASTINUM;  Surgeon: Yg Kaufman MD;   [address];  Service: Cardiovascular;  Laterality: N/A;    LYSIS OF ADHESIONS  7/13/2022    Procedure: LYSIS, ADHESIONS;  Surgeon: Yg Kaufman MD;  [address];  Service: Cardiovascular;;    NONINVASIVE CARDIAC ELECTROPHYSIOLOGY STUDY N/A 10/18/2019    Procedure: CARDIAC ELECTROPHYSIOLOGY STUDY, NONINVASIVE;  Surgeon: Raz Wagner MD;  Location: [facility] EP LAB;  Service: Cardiology;  Laterality: N/A;  VT, DFTs, MDT CRTD in situ, LVAD, anes, MB, 3098    REPLACEMENT OF IMPLANTABLE CARDIOVERTER-DEFIBRILLATOR (ICD) GENERATOR N/A 3/9/2020    Procedure: REPLACEMENT, ICD GENERATOR;  Surgeon: Harry Yun MD;  Location: [facility] EP LAB;  Service: Cardiology;  Laterality: N/A;  VT, ICD Gen Change and Lead Revision, MDT, MAC, DM,3 Prep    REPLACEMENT OF LEFT VENTRICULAR ASSIST DEVICE (LVAD)  7/13/2022    Procedure: REPLACEMENT, LVAD;  Surgeon: Yg Kaufman MD;  [address];  Service: Cardiovascular;;    REPLACEMENT OF PUMP N/A 7/13/2022    Procedure: REPLACEMENT, PUMP;  Surgeon: Yg Kaufman MD;  Location: [address];  Service: Cardiovascular;  Laterality: N/A;  LVAD pump exchange  EXPLANATION OF HEATMATE 2  IMPLANTATION OF HEARTMATE 3  IMPLANTATION OF 8MM CHIMNEY GRAFT TO RFA  INITIATION OF ECMO  TEMPORARY CLOSURE OF CHEST    REVISION OF IMPLANTABLE CARDIOVERTER-DEFIBRILLATOR (ICD) ELECTRODE LEAD PLACEMENT N/A 3/9/2020    Procedure: REVISION, INSERTION, ELECTRODE LEAD, ICD;  Surgeon: Harry Yun MD;  Location: [facility] EP LAB;  Service: Cardiology;  Laterality: N/A;  VT, ICD Gen Change and Lead Revision, MDT, MAC, DM,3 Prep    STERNAL WOUND CLOSURE N/A 7/14/2022    Procedure: CLOSURE, WOUND, STERNUM;  Surgeon: Yg Kaufman MD;  [address];  Service: Cardiovascular;  Laterality: N/A;  temporary closure  evacuation of hematoma    STERNAL WOUND CLOSURE N/A 7/15/2022    Procedure: CLOSURE, WOUND, STERNUM;  Surgeon: Yg Kaufman MD;  Location: [facility]  2ND FLR;  Service: Cardiovascular;  Laterality: N/A;    TRACHEOSTOMY N/A 8/4/2022    Procedure: CREATION, TRACHEOSTOMY;  Surgeon: Germain Holt MD;  Location: [facility] OR 2ND FLR;  Service: General;  Laterality: N/A;    TREATMENT OF CARDIAC ARRHYTHMIA  10/18/2019    Procedure: Cardioversion or Defibrillation;  Surgeon: Raz Wagner MD;  Location: [facility] EP LAB;  Service: Cardiology;;       Review of Systems:   As documented in primary team H&P    Home Meds:   Prior to Admission medications    Medication Sig Start Date End Date Taking? Authorizing Provider   ALPRAZolam (XANAX) 1 MG tablet Take 0.5-1 tablets (0.5-1 mg total) by mouth 2 (two) times daily as needed for Anxiety. 10/13/22   Crow Krueger MD   amiodarone (PACERONE) 200 MG Tab Take 2 tablets (400 mg total) by mouth once daily. 10/28/22 10/28/23  Diego Daniel MD   ferrous gluconate (FERGON) 324 MG tablet Take 1 tablet (324 mg total) by mouth 2 (two) times daily with meals. 11/25/22   Guerda Bautista MD   levothyroxine (SYNTHROID) 50 MCG tablet Take 1 tablet (50 mcg total) by mouth before breakfast. 10/28/22   Diego Daniel MD   magnesium oxide (MAG-OX) 400 mg (241.3 mg magnesium) tablet Take 1 tablet (400 mg total) by mouth 2 (two) times daily. 11/8/22   Roslny Ragsdale DO   mexiletine (MEXITIL) 250 MG Cap Take 1 capsule (250 mg total) by mouth every 8 (eight) hours. 9/1/22 9/1/23  Loki Randall NP   mirtazapine (REMERON) 30 MG tablet Take 1 tablet (30 mg total) by mouth every evening. 11/8/22   Wendie Henley MD   omega-3 acid ethyl esters (LOVAZA) 1 gram capsule Take 2 capsules (2 g total) by mouth 2 (two) times daily. 10/28/22   Diego Daniel MD   pantoprazole (PROTONIX) 40 MG tablet Take 1 tablet (40 mg total) by mouth daily with lunch. 10/28/22   Diego Daniel MD   sodium chloride 0.9% SolP 100 mL with ertapenem 1 gram SolR 1 g Inject 1 g into the vein once daily. 10/5/22   Roslyn Ragsdale DO   voriconazole (VFEND) 200 MG  Tab Take 1 tablet (200 mg total) by mouth 2 (two) times daily. Take 400 mg (2 tabs) by mouth twice a day for 1 day then 200 mg (1 tab) twice a day 11/9/22   Jessica Perez MD   warfarin (COUMADIN) 5 MG tablet Take a half tablet (2.5mg) by mouth on 10/5 only. Then take 1 tablet (5mg) daily 10/28/22   Diego Daniel MD   sildenafiL (VIAGRA) 100 MG tablet Take 1 tablet (100 mg total) by mouth daily as needed for Erectile Dysfunction. 2/22/22 6/27/22  Guerda Bautista MD     Scheduled Meds:    [START ON 12/3/2022] amiodarone  400 mg Oral Daily    ertapenem (INVANZ) IVPB  1 g Intravenous Daily    ferrous gluconate  324 mg Oral BID WM    [START ON 12/3/2022] levothyroxine  50 mcg Oral Before breakfast    magnesium oxide  400 mg Oral BID    mexiletine  250 mg Oral Q8H    mirtazapine  30 mg Oral QHS    omega-3 acid ethyl esters  2 g Oral BID    pantoprazole  40 mg Oral Daily with lunch    voriconazole  200 mg Oral BID     Continuous Infusions:   PRN Meds:ALPRAZolam  Anticoagulants/Antiplatelets: no anticoagulation    Allergies:   Review of patient's allergies indicates:   Allergen Reactions    Lisinopril Anaphylaxis    Hydralazine analogues      Chronic constipation, impotence, dizziness     Sedation Hx: have not been any systemic reactions    Labs:  Recent Labs   Lab 12/01/22 0915   INR 3.0*       Recent Labs   Lab 12/01/22 0915   WBC 3.81*   HGB 9.9*   HCT 33.4*   MCV 88         Recent Labs   Lab 12/01/22 0915   GLU 78      K 3.8      CO2 26   BUN 16   CREATININE 1.6*   CALCIUM 9.3   MG 2.0   ALT 10   AST 21   ALBUMIN 3.0*   BILITOT 0.3   BILIDIR 0.2         Vitals:  Temp: 98.6 °F (37 °C) (12/02/22 1125)  Pulse: 69 (12/02/22 1129)  Resp: 18 (12/02/22 1125)  BP: (!) 80/0 (12/02/22 1125)  SpO2: (!) 92 % (12/02/22 1125)     Physical Exam:  ASA: III  Mallampati: I    General: no acute distress  Mental Status: alert and oriented to person, place and time  HEENT: normocephalic, atraumatic  Chest:  unlabored breathing  Heart: regular heart rate  Abdomen: nondistended  Extremity: moves all extremities      Plan:  Sedation Plan: Local   Patient will undergo Rt groin collection drain.          Alex Oro MD  Radiology Resident PGY- 2  [facility]

## 2022-12-02 NOTE — INTERVAL H&P NOTE
The patient has been examined and the H&P has been reviewed:    I concur with the findings and no changes have occurred since H&P was written.    Patient directly admitted following US right groin 12/1 that showed right inguinal subcutaneous cystic structure, favored to represent a seroma or lymphocele. Will consult plastics, Vascular Surgery, IR and ID for further evaluation. Also has noted increased DLES drainage. Cultures obtained in clinic yesterday with NGTD. 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

## 2022-12-03 ENCOUNTER — TELEPHONE (OUTPATIENT)
Dept: TRANSPLANT | Facility: HOSPITAL | Age: 57
End: 2022-12-03
Payer: MEDICARE

## 2022-12-03 ENCOUNTER — TELEPHONE (OUTPATIENT)
Dept: ENDOCRINOLOGY | Facility: HOSPITAL | Age: 57
End: 2022-12-03
Payer: MEDICARE

## 2022-12-03 VITALS
TEMPERATURE: 98 F | HEART RATE: 66 BPM | OXYGEN SATURATION: 95 % | RESPIRATION RATE: 20 BRPM | SYSTOLIC BLOOD PRESSURE: 108 MMHG | WEIGHT: 206.81 LBS | BODY MASS INDEX: 27.41 KG/M2 | DIASTOLIC BLOOD PRESSURE: 82 MMHG | HEIGHT: 73 IN

## 2022-12-03 DIAGNOSIS — I97.648 POSTOPERATIVE SEROMA INVOLVING CIRCULATORY SYSTEM AFTER OTHER CIRCULATORY SYSTEM PROCEDURE: Primary | ICD-10-CM

## 2022-12-03 DIAGNOSIS — E03.2 HYPOTHYROIDISM DUE TO AMIODARONE: Primary | ICD-10-CM

## 2022-12-03 DIAGNOSIS — T46.2X1A HYPOTHYROIDISM DUE TO AMIODARONE: Primary | ICD-10-CM

## 2022-12-03 PROBLEM — T79.2XXA SEROMA DUE TO TRAUMA: Status: ACTIVE | Noted: 2022-12-03

## 2022-12-03 PROBLEM — E03.8 OTHER SPECIFIED HYPOTHYROIDISM: Status: ACTIVE | Noted: 2022-12-03

## 2022-12-03 LAB
ANION GAP SERPL CALC-SCNC: 5 MMOL/L (ref 8–16)
APTT BLDCRRT: 49 SEC (ref 21–32)
BASOPHILS # BLD AUTO: 0.02 K/UL (ref 0–0.2)
BASOPHILS NFR BLD: 0.5 % (ref 0–1.9)
BUN SERPL-MCNC: 14 MG/DL (ref 6–20)
CALCIUM SERPL-MCNC: 9 MG/DL (ref 8.7–10.5)
CHLORIDE SERPL-SCNC: 105 MMOL/L (ref 95–110)
CO2 SERPL-SCNC: 27 MMOL/L (ref 23–29)
CREAT SERPL-MCNC: 1.4 MG/DL (ref 0.5–1.4)
DIFFERENTIAL METHOD: ABNORMAL
EOSINOPHIL # BLD AUTO: 0.2 K/UL (ref 0–0.5)
EOSINOPHIL NFR BLD: 5.1 % (ref 0–8)
ERYTHROCYTE [DISTWIDTH] IN BLOOD BY AUTOMATED COUNT: 17.1 % (ref 11.5–14.5)
EST. GFR  (NO RACE VARIABLE): 59 ML/MIN/1.73 M^2
GLUCOSE SERPL-MCNC: 80 MG/DL (ref 70–110)
HCT VFR BLD AUTO: 29 % (ref 40–54)
HGB BLD-MCNC: 8.4 G/DL (ref 14–18)
IMM GRANULOCYTES # BLD AUTO: 0.01 K/UL (ref 0–0.04)
IMM GRANULOCYTES NFR BLD AUTO: 0.3 % (ref 0–0.5)
INR PPP: 2.6 (ref 0.8–1.2)
LDH SERPL L TO P-CCNC: 197 U/L (ref 110–260)
LYMPHOCYTES # BLD AUTO: 1.1 K/UL (ref 1–4.8)
LYMPHOCYTES NFR BLD: 27 % (ref 18–48)
MAGNESIUM SERPL-MCNC: 1.9 MG/DL (ref 1.6–2.6)
MCH RBC QN AUTO: 25.9 PG (ref 27–31)
MCHC RBC AUTO-ENTMCNC: 29 G/DL (ref 32–36)
MCV RBC AUTO: 90 FL (ref 82–98)
MONOCYTES # BLD AUTO: 0.5 K/UL (ref 0.3–1)
MONOCYTES NFR BLD: 12.9 % (ref 4–15)
NEUTROPHILS # BLD AUTO: 2.2 K/UL (ref 1.8–7.7)
NEUTROPHILS NFR BLD: 54.2 % (ref 38–73)
NRBC BLD-RTO: 0 /100 WBC
PHOSPHATE SERPL-MCNC: 2.7 MG/DL (ref 2.7–4.5)
PLATELET # BLD AUTO: 303 K/UL (ref 150–450)
PMV BLD AUTO: 10.5 FL (ref 9.2–12.9)
POTASSIUM SERPL-SCNC: 4 MMOL/L (ref 3.5–5.1)
PROTHROMBIN TIME: 25.6 SEC (ref 9–12.5)
RBC # BLD AUTO: 3.24 M/UL (ref 4.6–6.2)
SODIUM SERPL-SCNC: 137 MMOL/L (ref 136–145)
T4 FREE SERPL-MCNC: 0.85 NG/DL (ref 0.71–1.51)
TSH SERPL DL<=0.005 MIU/L-ACNC: 14.31 UIU/ML (ref 0.4–4)
WBC # BLD AUTO: 3.96 K/UL (ref 3.9–12.7)

## 2022-12-03 PROCEDURE — 99214 PR OFFICE/OUTPT VISIT, EST, LEVL IV, 30-39 MIN: ICD-10-PCS | Mod: GC,,, | Performed by: INTERNAL MEDICINE

## 2022-12-03 PROCEDURE — 85025 COMPLETE CBC W/AUTO DIFF WBC: CPT | Performed by: NURSE PRACTITIONER

## 2022-12-03 PROCEDURE — 85610 PROTHROMBIN TIME: CPT | Performed by: NURSE PRACTITIONER

## 2022-12-03 PROCEDURE — 84439 ASSAY OF FREE THYROXINE: CPT | Performed by: NURSE PRACTITIONER

## 2022-12-03 PROCEDURE — 87075 CULTR BACTERIA EXCEPT BLOOD: CPT | Performed by: NURSE PRACTITIONER

## 2022-12-03 PROCEDURE — 99233 PR SUBSEQUENT HOSPITAL CARE,LEVL III: ICD-10-PCS | Mod: ,,, | Performed by: NURSE PRACTITIONER

## 2022-12-03 PROCEDURE — 99233 SBSQ HOSP IP/OBS HIGH 50: CPT | Mod: ,,, | Performed by: NURSE PRACTITIONER

## 2022-12-03 PROCEDURE — 83615 LACTATE (LD) (LDH) ENZYME: CPT | Performed by: NURSE PRACTITIONER

## 2022-12-03 PROCEDURE — G0378 HOSPITAL OBSERVATION PER HR: HCPCS

## 2022-12-03 PROCEDURE — 93750 PR INTERROGATE VENT ASSIST DEV, IN PERSON, W PHYSICIAN ANALYSIS: ICD-10-PCS | Mod: ,,, | Performed by: INTERNAL MEDICINE

## 2022-12-03 PROCEDURE — 99214 OFFICE O/P EST MOD 30 MIN: CPT | Mod: GC,,, | Performed by: INTERNAL MEDICINE

## 2022-12-03 PROCEDURE — 84443 ASSAY THYROID STIM HORMONE: CPT | Performed by: NURSE PRACTITIONER

## 2022-12-03 PROCEDURE — 85730 THROMBOPLASTIN TIME PARTIAL: CPT | Performed by: NURSE PRACTITIONER

## 2022-12-03 PROCEDURE — 87070 CULTURE OTHR SPECIMN AEROBIC: CPT | Performed by: NURSE PRACTITIONER

## 2022-12-03 PROCEDURE — 84100 ASSAY OF PHOSPHORUS: CPT | Performed by: NURSE PRACTITIONER

## 2022-12-03 PROCEDURE — 63600175 PHARM REV CODE 636 W HCPCS: Performed by: NURSE PRACTITIONER

## 2022-12-03 PROCEDURE — 83735 ASSAY OF MAGNESIUM: CPT | Performed by: NURSE PRACTITIONER

## 2022-12-03 PROCEDURE — 87102 FUNGUS ISOLATION CULTURE: CPT | Performed by: NURSE PRACTITIONER

## 2022-12-03 PROCEDURE — 93750 INTERROGATION VAD IN PERSON: CPT | Mod: ,,, | Performed by: INTERNAL MEDICINE

## 2022-12-03 PROCEDURE — 80048 BASIC METABOLIC PNL TOTAL CA: CPT | Performed by: NURSE PRACTITIONER

## 2022-12-03 PROCEDURE — 25000003 PHARM REV CODE 250: Performed by: NURSE PRACTITIONER

## 2022-12-03 RX ORDER — LEVOTHYROXINE SODIUM 75 UG/1
75 TABLET ORAL
Status: DISCONTINUED | OUTPATIENT
Start: 2022-12-04 | End: 2022-12-03 | Stop reason: HOSPADM

## 2022-12-03 RX ORDER — AMLODIPINE BESYLATE 5 MG/1
5 TABLET ORAL DAILY
Qty: 30 TABLET | Refills: 11 | Status: SHIPPED | OUTPATIENT
Start: 2022-12-04 | End: 2023-01-12

## 2022-12-03 RX ORDER — AMLODIPINE BESYLATE 5 MG/1
5 TABLET ORAL DAILY
Status: DISCONTINUED | OUTPATIENT
Start: 2022-12-03 | End: 2022-12-03 | Stop reason: HOSPADM

## 2022-12-03 RX ORDER — LEVOTHYROXINE SODIUM 75 UG/1
75 TABLET ORAL
Qty: 30 TABLET | Refills: 6 | Status: SHIPPED | OUTPATIENT
Start: 2022-12-03 | End: 2023-01-03

## 2022-12-03 RX ADMIN — MAGNESIUM OXIDE TAB 400 MG (241.3 MG ELEMENTAL MG) 400 MG: 400 (241.3 MG) TAB at 09:12

## 2022-12-03 RX ADMIN — OMEGA-3-ACID ETHYL ESTERS 2 G: 1 CAPSULE, LIQUID FILLED ORAL at 09:12

## 2022-12-03 RX ADMIN — LEVOTHYROXINE SODIUM 50 MCG: 50 TABLET ORAL at 05:12

## 2022-12-03 RX ADMIN — MEXILETINE HYDROCHLORIDE 250 MG: 250 CAPSULE ORAL at 05:12

## 2022-12-03 RX ADMIN — PANTOPRAZOLE SODIUM 40 MG: 40 TABLET, DELAYED RELEASE ORAL at 01:12

## 2022-12-03 RX ADMIN — AMIODARONE HYDROCHLORIDE 400 MG: 200 TABLET ORAL at 09:12

## 2022-12-03 RX ADMIN — Medication 324 MG: at 01:12

## 2022-12-03 RX ADMIN — AMLODIPINE BESYLATE 5 MG: 5 TABLET ORAL at 07:12

## 2022-12-03 RX ADMIN — ERTAPENEM 1 G: 1 INJECTION INTRAMUSCULAR; INTRAVENOUS at 10:12

## 2022-12-03 RX ADMIN — VORICONAZOLE 200 MG: 200 TABLET, FILM COATED ORAL at 01:12

## 2022-12-03 NOTE — PLAN OF CARE
POC reviewed with patient with verbal understanding. LVAD Numbers WNL. Doppler pressures. Dressing change completed EOD. Due on 12/3/2022 with Kit. Plan for IR today or tomorrow for right groin site. Right groin site soft and swollen. Pink in color no drainage noted. IV aBX given. Denies pain or SOB. Call light in reach    Problem: Adult Inpatient Plan of Care  Goal: Plan of Care Review  Outcome: Ongoing, Progressing  Goal: Patient-Specific Goal (Individualized)  Outcome: Ongoing, Progressing  Goal: Absence of Hospital-Acquired Illness or Injury  Outcome: Ongoing, Progressing  Goal: Optimal Comfort and Wellbeing  Outcome: Ongoing, Progressing  Goal: Readiness for Transition of Care  Outcome: Ongoing, Progressing     Problem: Infection  Goal: Absence of Infection Signs and Symptoms  Outcome: Ongoing, Progressing     Problem: Impaired Wound Healing  Goal: Optimal Wound Healing  Outcome: Ongoing, Progressing     Problem: Fall Injury Risk  Goal: Absence of Fall and Fall-Related Injury  Outcome: Ongoing, Progressing

## 2022-12-03 NOTE — PLAN OF CARE
[facility]   Heart Transplant/[facility]   [address] [place] [address]   [phone] after hours          [phone] fax     VAD HOME  HEALTH ORDERS      Admit to Home Health    Diagnosis:   Patient Active Problem List   Diagnosis    Cigarette nicotine dependence without complication    Alcohol abuse    Pulmonary nodule seen on imaging study    Chronic combined systolic and diastolic congestive heart failure    Palliative care encounter    Hypoglycemia    Hyperbilirubinemia    Anemia    Hypertension    LVAD (left ventricular assist device) present    Pre-transplant evaluation for heart transplant    GIB (gastrointestinal bleeding)    Thrombocytopenia, unspecified    GI bleed    VT (ventricular tachycardia)    Amiodarone-induced hyperthyroidism    Substance or medication-induced sleep disorder, insomnia type    VF (ventricular fibrillation)    CHICHI (obstructive sleep apnea)    Post traumatic stress disorder (PTSD)    Acute on chronic combined systolic and diastolic congestive heart failure    History of ventricular tachycardia    Severe malnutrition    Atrial flutter    Adjustment disorder with mixed anxiety and depressed mood    Hyponatremia    Stage 3b chronic kidney disease    Hypertensive cardiovascular-renal disease, stage 1-4 or unspecified chronic kidney disease, with heart failure    High risk medications (not anticoagulants) long-term use    Dilated cardiomyopathy    Anticoagulation monitoring, INR range 2-3    Impaired mobility    Acute on chronic combined systolic and diastolic heart failure    Left ventricular assist device (LVAD) complication, subsequent encounter    History of COVID-19    Groin hematoma    Pseudoaneurysm of right femoral artery    Mycotic aneurysm    Tracheostomy present    Postprocedural seroma of a circulatory system organ or structure following other circulatory system procedure       Patient is homebound due to:  NYHA Class  IV HF. S/P LVAD placement.     Diet: Low Fat, Low cholesterol, 2Gm Na, Coumadin restrictions.    Acitivities: No Swimming, bathing, vacuuming, contact sports.    Fresh implants= Sternal Precautions    Nursing:   SN to complete comprehensive assessment including routine vital signs. Instruct on disease process and s/s of complications to report to MD. Review/verify medication list sent home with the patient at time of discharge  and instruct patient/caregiver as needed. Frequency may be adjusted depending on start of care date.    **LVAD driveline exit site dressing change is to be completed per LVAD patient/caregiver only**.    Notify MD if:  SBP > 120 or < 80;   MAP > 80 or < 65;   HR > 120 or < 60;   Temp > 101;   Weight gain >3lbs in 1 day or 5lbs in 1 week.    LABS:  SN to perform labs: PT/INR per Coumadin clinic [phone].   Follow up INR date: Monday, 12/5/22  No Finger Sticks    Check voriconazole level 30 min prior to dose on Monday, 12/5/22, to ensure in therapeutic range (last level 1.1)    HOME INFUSION THERAPY:  SN to perform Infusion Therapy/Central Line Care.  Review Central Line Care & Central Line Flush with patient.    Administer (drug and dose):  Ertapenem, 1 gram IV, every 24 hours (administer over 30 minutes) indefinitely for now         Central line care:  Scrub the Hub: Prior to accessing the line, always perform a 30 second alcohol scrub  Each lumen of the central line is to be flushed at least daily with 10 mL Normal Saline and 3 mL Heparin flush (100 units/mL)  Skilled Nurse (SN) may draw blood from IV access  Blood Draw Procedure:   - Aspirate at least 5 mL of blood   - Discard   - Obtain specimen   - Change posiflow cap   - Flush with 20 mL Normal Saline followed by a                 3-5 mL Heparin flush (100 units/mL)  Central :   - Sterile dressing changes are done weekly and as needed.   - Use chlor-hexadine scrub to cleanse site, apply Biopatch to insertion site,        apply securement device dressing   - Posi-flow caps are changed weekly and after EVERY lab draw.   - If sterile gauze is under dressing to control oozing,                 dressing change must be performed every 24 hours until gauze is not needed.        Send initial Home Health orders to [facility] attending physician on call.  Send follow up questions to VAD clinic MD [phone] or [phone].

## 2022-12-03 NOTE — ASSESSMENT & PLAN NOTE
- On outpatient antibiotic regimen  - History of graft replacement in femoral artery region due to infection concern

## 2022-12-03 NOTE — SUBJECTIVE & OBJECTIVE
Interval HPI:   Overnight events: New admission, no thyroid related issues noted.  No new concerns overnight.   Eatin%  Nausea: No  Hypoglycemia and intervention: No  Fever: No  TPN and/or TF: No  If yes, type of TF/TPN and rate: N/A      ROS:  As above    Labs Reviewed and Include:  BASELINE Creatinine:   [unfilled]  [unfilled]  [unfilled]  Recent Labs   Lab 22  1632 22  0506   GLU 74 80   CALCIUM 9.5 9.0   ALBUMIN 2.7*  --    PROT 7.3  --     137   K 4.2 4.0   CO2 26 27    105   BUN 15 14   CREATININE 1.4 1.4   ALKPHOS 71  --    ALT 8*  --    AST 24  --    BILITOT 0.3  --      Lab Results   Component Value Date    HGBA1C 5.4 2021       Nutritional status:   Body mass index is 27.28 kg/m².  Lab Results   Component Value Date    ALBUMIN 2.7 (L) 2022    ALBUMIN 3.0 (L) 2022    ALBUMIN 2.6 (L) 2022     Lab Results   Component Value Date    PREALBUMIN 19 (L) 2022    PREALBUMIN 15 (L) 2022    PREALBUMIN 14 (L) 10/05/2022       Estimated Creatinine Clearance: 66.6 mL/min (based on SCr of 1.4 mg/dL).    POCT Glucose results:    Current Insulin Regimen:       PHYSICAL EXAMINATION:  Vitals:    22 1021   BP:    Pulse: 69   Resp:    Temp:      Body mass index is 27.28 kg/m².    Physical Exam  Vitals reviewed.   Constitutional:       Appearance: Normal appearance.   HENT:      Head: Normocephalic and atraumatic.   Eyes:      Extraocular Movements: Extraocular movements intact.   Neck:      Comments: Prior trach site with scar noted  Cardiovascular:      Rate and Rhythm: Normal rate.      Comments: LVAD hum heard  Pulmonary:      Effort: Pulmonary effort is normal.   Abdominal:      Palpations: Abdomen is soft.   Musculoskeletal:      Cervical back: Normal range of motion.   Neurological:      General: No focal deficit present.      Mental Status: He is alert and oriented to person, place, and time.

## 2022-12-03 NOTE — PLAN OF CARE
Pt maintained free from falls/trauma/injuries and skin breakdown. Pt denied pain or discomfort. Pt BP elevated in the AM and amlodipine given. No alarms noted overnight. Right groin and right abd drsg changed. Lab drawn. Plan of care reviewed. Pt verbalized understanding. All questions and concerns addressed. Will continue to monitor.

## 2022-12-03 NOTE — CONSULTS
[facility] - [facility] Stepdown  Endocrinology  Consult Note    Consult Requested by: Amy Rhodes MD   Reason for admit: Mycotic aneurysm    HISTORY OF PRESENT ILLNESS:  Mr. Richards is a 56 year-old  male with NICM with LVAD, s/p ICD for VT on amiodarone and mexiletine and AIT followed by hypothyroidism initially admitted 6/27/2022 with COVID respiratory failure complicated with LVAD pump thrombosis that was refractory to medical therapy. Underwent LVAD pump exchange. Post-op course complicated by worsening cardiogenic shock/RV failure requiring VA-ECMO. Improved clinically underwent successful decannulation. Continued episodes of VT with ICD shock 7/21 and ongoing anti-arrhythmic mediation adjustments. TFTs on 7/27 significant for recurrent hyperthyroidism with undetectable TSH and elevated fT4.  Was treated with Methimazole and steroids until resolution and then placed back on thyroid supplementation.  Additional hospitalization in October where there were concerns for infection related to grafts in the groin related to LVAD and graft exchange performed and he was placed on antibiotics.  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      He confirms compliance with Synthroid since last hospitalization.  Has remained on 50 mcg daily.  No signs of hypothyroidism.  He denies constipation, cold in tolerance, brain fog or other neurologic complaints.  Known LVAD in place with chronic antibiotics since last hospital stay in October.  No other new concerns.       - Endocrinology consulted for hypothyroidism and treatment recommendations    Lab Results   Component Value Date    TSH 14.311 (H) 12/03/2022    J8KBGSN <40 (L) 08/26/2022    E9BHZAY 8.4 08/14/2022    FREET4 0.85 12/03/2022      Home regimen:  Levothyroxine 50 mcg, taken every morning and waits 30 minutes before food or other medicines.    Has upcoming  appointment in early January for further monitoring of thyroid with endocrinology.       Medications and/or Treatments Impacting Glycemic Control:  Immunotherapy:    Immunosuppressants     None        Steroids:   Hormones (From admission, onward)    None        Pressors:    Autonomic Drugs (From admission, onward)    None          Medications Prior to Admission   Medication Sig Dispense Refill Last Dose    ALPRAZolam (XANAX) 1 MG tablet Take 0.5-1 tablets (0.5-1 mg total) by mouth 2 (two) times daily as needed for Anxiety. 30 tablet 5 12/1/2022    amiodarone (PACERONE) 200 MG Tab Take 2 tablets (400 mg total) by mouth once daily. 180 tablet 3 12/2/2022    ferrous gluconate (FERGON) 324 MG tablet Take 1 tablet (324 mg total) by mouth 2 (two) times daily with meals. 60 tablet 11 12/2/2022    magnesium oxide (MAG-OX) 400 mg (241.3 mg magnesium) tablet Take 1 tablet (400 mg total) by mouth 2 (two) times daily. 90 tablet 11 12/2/2022    mexiletine (MEXITIL) 250 MG Cap Take 1 capsule (250 mg total) by mouth every 8 (eight) hours. 90 capsule 3 12/2/2022    mirtazapine (REMERON) 30 MG tablet Take 1 tablet (30 mg total) by mouth every evening. 90 tablet 1 12/2/2022    omega-3 acid ethyl esters (LOVAZA) 1 gram capsule Take 2 capsules (2 g total) by mouth 2 (two) times daily. 360 capsule 3 12/2/2022    pantoprazole (PROTONIX) 40 MG tablet Take 1 tablet (40 mg total) by mouth daily with lunch. 90 tablet 3 12/2/2022    voriconazole (VFEND) 200 MG Tab Take 1 tablet (200 mg total) by mouth 2 (two) times daily. Take 400 mg (2 tabs) by mouth twice a day for 1 day then 200 mg (1 tab) twice a day 60 tablet 2 12/2/2022    warfarin (COUMADIN) 5 MG tablet Take a half tablet (2.5mg) by mouth on 10/5 only. Then take 1 tablet (5mg) daily 135 tablet 3 12/1/2022       Current Facility-Administered Medications   Medication Dose Route Frequency Provider Last Rate Last Admin    ALPRAZolam tablet 0.5 mg  0.5 mg Oral BID PRN Maira  Radames, NP   0.5 mg at 22 1746    amiodarone tablet 400 mg  400 mg Oral Daily Maira Radames, NP   400 mg at 22 0956    amLODIPine tablet 5 mg  5 mg Oral Daily Maira Radames, NP   5 mg at 22 0730    ertapenem (INVANZ) 1 g in sodium chloride 0.9% 100 mL IVPB  1 g Intravenous Daily Maira Radames, NP   Stopped at 22 1030    ferrous gluconate tablet 324 mg  324 mg Oral BID WM Maira Radames, NP   324 mg at 22 1736    [START ON 2022] levothyroxine tablet 75 mcg  75 mcg Oral Before breakfast Osmar Vergara DO        magnesium oxide tablet 400 mg  400 mg Oral BID Maira Radames, NP   400 mg at 22 0953    mexiletine capsule 250 mg  250 mg Oral Q8H Maira Radames, NP   250 mg at 22 0512    mirtazapine tablet 30 mg  30 mg Oral QHS Maira Radames, NP   30 mg at 22 2107    omega-3 acid ethyl esters capsule 2 g  2 g Oral BID Maira Radames, NP   2 g at 22 0953    pantoprazole EC tablet 40 mg  40 mg Oral Daily with lunch Maira Radames, NP   40 mg at 22 1300    voriconazole tablet 200 mg  200 mg Oral BID Maira Radames, NP   200 mg at 22 2107       Interval HPI:   Overnight events: New admission, no thyroid related issues noted.  No new concerns overnight.   Eatin%  Nausea: No  Hypoglycemia and intervention: No  Fever: No  TPN and/or TF: No  If yes, type of TF/TPN and rate: N/A      ROS:  As above    Labs Reviewed and Include:  BASELINE Creatinine:   [unfilled]  [unfilled]  [unfilled]  Recent Labs   Lab 22  1632 22  0506   GLU 74 80   CALCIUM 9.5 9.0   ALBUMIN 2.7*  --    PROT 7.3  --     137   K 4.2 4.0   CO2 26 27    105   BUN 15 14   CREATININE 1.4 1.4   ALKPHOS 71  --    ALT 8*  --    AST 24  --    BILITOT 0.3  --      Lab Results   Component Value Date    HGBA1C 5.4 2021       Nutritional status:   Body mass index is 27.28 kg/m².  Lab Results   Component Value Date    ALBUMIN 2.7 (L)  12/02/2022    ALBUMIN 3.0 (L) 12/01/2022    ALBUMIN 2.6 (L) 11/28/2022     Lab Results   Component Value Date    PREALBUMIN 19 (L) 12/01/2022    PREALBUMIN 15 (L) 11/03/2022    PREALBUMIN 14 (L) 10/05/2022       Estimated Creatinine Clearance: 66.6 mL/min (based on SCr of 1.4 mg/dL).    POCT Glucose results:    Current Insulin Regimen:       PHYSICAL EXAMINATION:  Vitals:    12/03/22 1021   BP:    Pulse: 69   Resp:    Temp:      Body mass index is 27.28 kg/m².    Physical Exam  Vitals reviewed.   Constitutional:       Appearance: Normal appearance.   HENT:      Head: Normocephalic and atraumatic.   Eyes:      Extraocular Movements: Extraocular movements intact.   Neck:      Comments: Prior trach site with scar noted  Cardiovascular:      Rate and Rhythm: Normal rate.      Comments: LVAD hum heard  Pulmonary:      Effort: Pulmonary effort is normal.   Abdominal:      Palpations: Abdomen is soft.   Musculoskeletal:      Cervical back: Normal range of motion.   Neurological:      General: No focal deficit present.      Mental Status: He is alert and oriented to person, place, and time.     .     ASSESSMENT and PLAN:    * Mycotic aneurysm  - On outpatient antibiotic regimen  - History of graft replacement in femoral artery region due to infection concern      Other specified hypothyroidism  - Hypothyroidism requiring Levothyroxine supplementation, related to Amiodarone use  - TSH level checked during this admission suggesting under replacement and subclinical hypothyroid type levels  - Free T4 low normal range    Plan:  - Goal TSH for this patient would be 4 to 7.    - Will plan increase Levothyroxine dose to 75 mcg daily  - If patient remains hospitalized in 1 week, we would recommend rechecking thyroid studies and if TSH is not in the goal range (4 to 7) reach out to endocrine service.      **If patient discharges we have upcoming appointment in the office on January 3rd and will plan repeat thyroid studies before  that visit.  He would need to discharge on the new increased dose of Levothyroxine (75 mcg daily).      ** Endocrine will sign off for now but if still hospitalized in 1 week, recheck Free T4 and TSH.  If concerns reach out to endocine fellow on call.        Amiodarone-induced hyperthyroidism  - Known history of AIT, concerning for type 2, now hypothyroid needing replacement with LT4  - No further concerns for hyperthyroid signs, symptoms or evidence on thyroid function studies  - Remains on Amiodarone per cardiology service      LVAD (left ventricular assist device) present  - Known LVAD patient  - Management per primary service          Osmar Vergara,   Endocrinology

## 2022-12-03 NOTE — TELEPHONE ENCOUNTER
"On Call Transplant SW Note:    Received call from [facility]KK[facility] this morning regarding pt discharging and will need services for HH and IV med. Based on [facility] NIKK[facility], EMR and multiple phone calls to patient (spoke with pt to confirm information and pt was en route to his home from the hospital) - pt discharged today under care of wife and is returning to pts home locally as indicated in EMR, with home health orders to resume care with IV medications ordered. Orders routed to [facility] [place] location per pts EMR.    Included on Fax Cover Sheet to  agency today:   "Resuming HH care and please see medication order for IV Ertapenem. Please contact me with confirmation of acceptance to resume  care services (hospital discharge date is today), medication confirmation of delivery time today, and with any issues - [phone] - ask for the on call transplant  - Luanne."    I also left a message for the On Call Intake Nurse with [facility] -[phone] and provided my personal cell phone as a call back number. Pt states will also call  agency if does not hear from  rep within the next one hour and will call me back if has not received medications or  appt time/date within next one hour - contact information provided to pt and pt states clear understanding. Denies additional concerns. Transplant On Call SW remains available - [phone].  "

## 2022-12-03 NOTE — ASSESSMENT & PLAN NOTE
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 He is seen by both vascular surgery and ID for this. He saw ID 1 week prior. He is currently on ertapenem, planned for a total of 10 wk course, and voriconazole which they plan to continue for an extended period of time.  - Current speed 6400  - INR therapeutic at 2.6 (goal 2.0-3.0). Continue Coumadin  - LDH stable          Procedure: Device Interrogation Including analysis of device parameters  Current Settings: Ventricular Assist Device  Review of device function is stable    TXP LVAD INTERROGATIONS 12/3/2022 12/3/2022 12/2/2022 12/2/2022 12/2/2022 12/1/2022 10/5/2022   Type HeartMate3 HeartMate3 HeartMate3 HeartMate3 HeartMate3 - HeartMate3   Flow 5.5 5.6 5.7 5.6 5.6 - 5.4   Speed 6400 6400 6400 6400 6400 - 6400   PI 2.0 1.7 1.6 1.4 1.6 - 2.0   Power ([place]) 5.6 5.6 5.5 5.6 5.5 - 5.1   LSL 6000 6000 6000 6000 6000 - 6000   Low Flow Alarm - - - - - - -   High Power Alarm - - - - - - -   Pulsatility No Pulse - - - No Pulse No Pulse Intermittent pulse

## 2022-12-03 NOTE — TELEPHONE ENCOUNTER
On Call Transplant SW Note:    (Also see my previous note)    Left message for [facility] (agency per pts choice and per pts EMR) to confirm orders received for today's discharge and IV meds for delivery today.     Awaiting call back from On Call Infusion Rep with [facility] (Laura) - [phone] and left message on Laura's work cell VM.

## 2022-12-03 NOTE — PROGRESS NOTES
12/03/22 0500   Vital Signs   BP (!) 98/0   BP Location Left arm   BP Method Doppler   Patient Position Lying   Pt doppler elevated, denies any HAFACUNDO MD notified and instructed to recheck his BP in 30 mins.

## 2022-12-03 NOTE — ASSESSMENT & PLAN NOTE
- Known history of AIT, concerning for type 2, now hypothyroid needing replacement with LT4  - No further concerns for hyperthyroid signs, symptoms or evidence on thyroid function studies  - Remains on Amiodarone per cardiology service

## 2022-12-03 NOTE — HPI
Mr. Richards is a 56 year-old  male with NICM with LVAD, s/p ICD for VT on amiodarone and mexiletine and AIT followed by hypothyroidism initially admitted 6/27/2022 with COVID respiratory failure complicated with LVAD pump thrombosis that was refractory to medical therapy. Underwent LVAD pump exchange. Post-op course complicated by worsening cardiogenic shock/RV failure requiring VA-ECMO. Improved clinically underwent successful decannulation. Continued episodes of VT with ICD shock 7/21 and ongoing anti-arrhythmic mediation adjustments. TFTs on 7/27 significant for recurrent hyperthyroidism with undetectable TSH and elevated fT4.  Was treated with Methimazole and steroids until resolution and then placed back on thyroid supplementation.  Additional hospitalization in October where there were concerns for infection related to grafts in the groin related to LVAD and graft exchange performed and he was placed on antibiotics.  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      He confirms compliance with Synthroid since last hospitalization.  Has remained on 50 mcg daily.  No signs of hypothyroidism.  He denies constipation, cold in tolerance, brain fog or other neurologic complaints.  Known LVAD in place with chronic antibiotics since last hospital stay in October.  No other new concerns.       - Endocrinology consulted for hypothyroidism and treatment recommendations    Lab Results   Component Value Date    TSH 14.311 (H) 12/03/2022    A4MDXAG <40 (L) 08/26/2022    B0GQYFH 8.4 08/14/2022    FREET4 0.85 12/03/2022      Home regimen:  Levothyroxine 50 mcg, taken every morning and waits 30 minutes before food or other medicines.    Has upcoming appointment in early January for further monitoring of thyroid with endocrinology.

## 2022-12-03 NOTE — TELEPHONE ENCOUNTER
Discharged from the hospital today with a dose adjustment of Levothyroxine from 50 mcg to 75 mcg due to elevated TSH.  Goal TSH will be 4 to 7 given cardiac history.  Will need recheck thyroid labs late December/Early January prior to January 3rd follow up endocrine visit.

## 2022-12-03 NOTE — PROGRESS NOTES
12/03/22 0607   Vital Signs   /85   MAP (mmHg) 100   BP Location Left arm   BP Method Automatic   Pt BP still elevated, MD Greg notified and will get back to RN due to MD in an emergency.

## 2022-12-03 NOTE — PROGRESS NOTES
[facility] - [facility]iology Stepdown  Heart Transplant  Progress Note    Patient Name: Tim Richards  MRN: [id number]  Admission Date: 12/2/2022  Hospital Length of Stay: 1 days  Attending Physician: Gaurav Rodriguez MD  Primary Care Provider: Diego Daniel MD  Principal Problem:Mycotic aneurysm    Subjective:     **Interval History: Feels well, no constitutional symptoms. Large palpable seroma right groin. Has surface skin wound with no drainage. Appreciate ID's, Vascular Surgery's and Plastics help. Mr. Richards prefers to discharge home with home health today to f/u with CTS (Dr. Kaufman) on Monday or one day next week to ultimately determine need for any intervention on seroma.    Continuous Infusions:  Scheduled Meds:   amiodarone  400 mg Oral Daily    amLODIPine  5 mg Oral Daily    ertapenem (INVANZ) IVPB  1 g Intravenous Daily    ferrous gluconate  324 mg Oral BID WM    levothyroxine  50 mcg Oral Before breakfast    magnesium oxide  400 mg Oral BID    mexiletine  250 mg Oral Q8H    mirtazapine  30 mg Oral QHS    omega-3 acid ethyl esters  2 g Oral BID    pantoprazole  40 mg Oral Daily with lunch    voriconazole  200 mg Oral BID     PRN Meds:ALPRAZolam    Review of patient's allergies indicates:   Allergen Reactions    Lisinopril Anaphylaxis    Hydralazine analogues      Chronic constipation, impotence, dizziness     Objective:     Vital Signs (Most Recent):  Temp: 98.4 °F (36.9 °C) (12/03/22 0808)  Pulse: 77 (12/03/22 0808)  Resp: 19 (12/03/22 0808)  BP: (!) 100/0 (12/03/22 0808)  SpO2: 96 % (12/03/22 0808)   Vital Signs (24h Range):  Temp:  [98.3 °F (36.8 °C)-98.6 °F (37 °C)] 98.4 °F (36.9 °C)  Pulse:  [58-82] 77  Resp:  [18-20] 19  SpO2:  [92 %-97 %] 96 %  BP: ()/(0-85) 100/0     Patient Vitals for the past 72 hrs (Last 3 readings):   Weight   12/03/22 0731 93.8 kg (206 lb 12.7 oz)   12/02/22 1125 93.4 kg (205 lb 14.6 oz)     Body mass index is 27.28 kg/m².      Intake/Output Summary (Last 24 hours)  at 12/3/2022 0952  Last data filed at 12/3/2022 0514  Gross per 24 hour   Intake 480 ml   Output --   Net 480 ml       Hemodynamic Parameters:       Telemetry: SR    Physical Exam  Constitutional:       Appearance: Normal appearance.   HENT:      Head: Normocephalic and atraumatic.   Eyes:      Conjunctiva/sclera: Conjunctivae normal.      Pupils: Pupils are equal, round, and reactive to light.   Neck:      Comments: Neck veins are not elevated  Cardiovascular:      Rate and Rhythm: Normal rate and regular rhythm.      Comments: Smooth VAD hum  Pulmonary:      Effort: Pulmonary effort is normal.      Breath sounds: Normal breath sounds.   Abdominal:      General: Bowel sounds are normal.      Palpations: Abdomen is soft.   Musculoskeletal:         General: Normal range of motion.      Cervical back: Normal range of motion and neck supple.      Comments: Large seroma present right groin. Pedal pulses palpable   Skin:     General: Skin is warm and dry.      Capillary Refill: Capillary refill takes 2 to 3 seconds.   Neurological:      General: No focal deficit present.      Mental Status: He is alert and oriented to person, place, and time.   Psychiatric:         Mood and Affect: Mood normal.         Behavior: Behavior normal.         Thought Content: Thought content normal.         Judgment: Judgment normal.       Significant Labs:  CBC:  Recent Labs   Lab 12/01/22  0915 12/02/22  1632 12/03/22  0506   WBC 3.81* 4.36 3.96   RBC 3.78* 3.43* 3.24*   HGB 9.9* 8.9* 8.4*   HCT 33.4* 30.2* 29.0*    341 303   MCV 88 88 90   MCH 26.2* 25.9* 25.9*   MCHC 29.6* 29.5* 29.0*     BNP:  Recent Labs   Lab 12/01/22  0915   *     CMP:  Recent Labs   Lab 11/28/22  0952 12/01/22  0915 12/02/22  1632 12/03/22  0506   GLU 63* 78 74 80   CALCIUM 8.4* 9.3 9.5 9.0   ALBUMIN 2.6* 3.0* 2.7*  --    PROT 6.5 8.0 7.3  --     139 142 137   K 4.7 3.8 4.2 4.0   CO2 26 26 26 27    101 107 105   BUN 11 16 15 14   CREATININE  1.4 1.6* 1.4 1.4   ALKPHOS 76 82 71  --    ALT 10 10 8*  --    AST 16 21 24  --    BILITOT 0.2 0.3 0.3  --       Coagulation:   Recent Labs   Lab 11/28/22  0952 12/01/22  0915 12/03/22  0506   INR 2.8* 3.0* 2.6*   APTT  --   --  49.0*     LDH:  Recent Labs   Lab 12/01/22  0915 12/03/22  0506    197     Microbiology:  Microbiology Results (last 7 days)       Procedure Component Value Units Date/Time    Blood culture [[id number]] Collected: 12/02/22 1632    Order Status: Completed Specimen: Blood Updated: 12/03/22 0115     Blood Culture, Routine No Growth to date    Blood culture [[id number]] Collected: 12/02/22 1631    Order Status: Completed Specimen: Blood Updated: 12/03/22 0115     Blood Culture, Routine No Growth to date    Culture, Anaerobe [[id number]]     Order Status: No result Specimen: Abscess from Groin     Aerobic culture [[id number]]     Order Status: No result Specimen: Abscess from Groin     Fungus culture [[id number]]     Order Status: No result Specimen: Abscess from Groin             I have reviewed all pertinent labs within the past 24 hours.    Estimated Creatinine Clearance: 66.6 mL/min (based on SCr of 1.4 mg/dL).    Diagnostic Results:  I have reviewed and interpreted all pertinent imaging results/findings within the past 24 hours.    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 He is seen by both vascular surgery and ID for this. He saw ID 1 week prior. He is currently on ertapenem, planned for a total of 10 wk course, and voriconazole which they plan to continue for an extended period of time.  - Current speed 6400  - INR therapeutic at 2.6 (goal 2.0-3.0). Continue Coumadin  - LDH stable          Procedure: Device Interrogation Including analysis of device parameters  Current Settings: Ventricular Assist Device  Review of device function is stable    TXP LVAD INTERROGATIONS 12/3/2022 12/3/2022 12/2/2022 12/2/2022 12/2/2022 12/1/2022 10/5/2022   Type HeartMate3 HeartMate3 HeartMate3 HeartMate3 HeartMate3 - HeartMate3   Flow 5.5 5.6 5.7 5.6 5.6 - 5.4   Speed 6400 6400 6400 6400 6400 - 6400   PI 2.0 1.7 1.6 1.4 1.6 - 2.0   Power ([place]) 5.6 5.6 5.5 5.6 5.5 - 5.1   LSL 6000 6000 6000 6000 6000 - 6000   Low Flow Alarm - - - - - - -   High Power Alarm - - - - - - -   Pulsatility No Pulse - - - No Pulse No Pulse Intermittent [place] [id number]  Heart Transplant  [facility] - [facility] Stepdown

## 2022-12-03 NOTE — SUBJECTIVE & OBJECTIVE
**Interval History: Feels well, no constitutional symptoms. Large palpable seroma right groin. Has surface skin wound with no drainage. Appreciate ID's, Vascular Surgery's and Plastics help. Mr. Richards prefers to discharge home with home health today to f/u with CTS (Dr. Kaufman) on Monday or one day next week to ultimately determine need for any intervention on seroma.    Continuous Infusions:  Scheduled Meds:   amiodarone  400 mg Oral Daily    amLODIPine  5 mg Oral Daily    ertapenem (INVANZ) IVPB  1 g Intravenous Daily    ferrous gluconate  324 mg Oral BID WM    levothyroxine  50 mcg Oral Before breakfast    magnesium oxide  400 mg Oral BID    mexiletine  250 mg Oral Q8H    mirtazapine  30 mg Oral QHS    omega-3 acid ethyl esters  2 g Oral BID    pantoprazole  40 mg Oral Daily with lunch    voriconazole  200 mg Oral BID     PRN Meds:ALPRAZolam    Review of patient's allergies indicates:   Allergen Reactions    Lisinopril Anaphylaxis    Hydralazine analogues      Chronic constipation, impotence, dizziness     Objective:     Vital Signs (Most Recent):  Temp: 98.4 °F (36.9 °C) (12/03/22 0808)  Pulse: 77 (12/03/22 0808)  Resp: 19 (12/03/22 0808)  BP: (!) 100/0 (12/03/22 0808)  SpO2: 96 % (12/03/22 0808)   Vital Signs (24h Range):  Temp:  [98.3 °F (36.8 °C)-98.6 °F (37 °C)] 98.4 °F (36.9 °C)  Pulse:  [58-82] 77  Resp:  [18-20] 19  SpO2:  [92 %-97 %] 96 %  BP: ()/(0-85) 100/0     Patient Vitals for the past 72 hrs (Last 3 readings):   Weight   12/03/22 0731 93.8 kg (206 lb 12.7 oz)   12/02/22 1125 93.4 kg (205 lb 14.6 oz)     Body mass index is 27.28 kg/m².      Intake/Output Summary (Last 24 hours) at 12/3/2022 0947  Last data filed at 12/3/2022 0514  Gross per 24 hour   Intake 480 ml   Output --   Net 480 ml       Hemodynamic Parameters:       Telemetry: SR    Physical Exam  Constitutional:       Appearance: Normal appearance.   HENT:      Head: Normocephalic and atraumatic.   Eyes:      Conjunctiva/sclera:  Conjunctivae normal.      Pupils: Pupils are equal, round, and reactive to light.   Neck:      Comments: Neck veins are not elevated  Cardiovascular:      Rate and Rhythm: Normal rate and regular rhythm.      Comments: Smooth VAD hum  Pulmonary:      Effort: Pulmonary effort is normal.      Breath sounds: Normal breath sounds.   Abdominal:      General: Bowel sounds are normal.      Palpations: Abdomen is soft.   Musculoskeletal:         General: Normal range of motion.      Cervical back: Normal range of motion and neck supple.      Comments: Large seroma present right groin. Pedal pulses palpable   Skin:     General: Skin is warm and dry.      Capillary Refill: Capillary refill takes 2 to 3 seconds.   Neurological:      General: No focal deficit present.      Mental Status: He is alert and oriented to person, place, and time.   Psychiatric:         Mood and Affect: Mood normal.         Behavior: Behavior normal.         Thought Content: Thought content normal.         Judgment: Judgment normal.       Significant Labs:  CBC:  Recent Labs   Lab 12/01/22  0915 12/02/22  1632 12/03/22  0506   WBC 3.81* 4.36 3.96   RBC 3.78* 3.43* 3.24*   HGB 9.9* 8.9* 8.4*   HCT 33.4* 30.2* 29.0*    341 303   MCV 88 88 90   MCH 26.2* 25.9* 25.9*   MCHC 29.6* 29.5* 29.0*     BNP:  Recent Labs   Lab 12/01/22  0915   *     CMP:  Recent Labs   Lab 11/28/22  0952 12/01/22  0915 12/02/22  1632 12/03/22  0506   GLU 63* 78 74 80   CALCIUM 8.4* 9.3 9.5 9.0   ALBUMIN 2.6* 3.0* 2.7*  --    PROT 6.5 8.0 7.3  --     139 142 137   K 4.7 3.8 4.2 4.0   CO2 26 26 26 27    101 107 105   BUN 11 16 15 14   CREATININE 1.4 1.6* 1.4 1.4   ALKPHOS 76 82 71  --    ALT 10 10 8*  --    AST 16 21 24  --    BILITOT 0.2 0.3 0.3  --       Coagulation:   Recent Labs   Lab 11/28/22  0952 12/01/22  0915 12/03/22  0506   INR 2.8* 3.0* 2.6*   APTT  --   --  49.0*     LDH:  Recent Labs   Lab 12/01/22  0915 12/03/22  0506    197      Microbiology:  Microbiology Results (last 7 days)       Procedure Component Value Units Date/Time    Blood culture [[id number]] Collected: 12/02/22 1632    Order Status: Completed Specimen: Blood Updated: 12/03/22 0115     Blood Culture, Routine No Growth to date    Blood culture [[id number]] Collected: 12/02/22 1631    Order Status: Completed Specimen: Blood Updated: 12/03/22 0115     Blood Culture, Routine No Growth to date    Culture, Anaerobe [[id number]]     Order Status: No result Specimen: Abscess from Groin     Aerobic culture [[id number]]     Order Status: No result Specimen: Abscess from Groin     Fungus culture [[id number]]     Order Status: No result Specimen: Abscess from Groin             I have reviewed all pertinent labs within the past 24 hours.    Estimated Creatinine Clearance: 66.6 mL/min (based on SCr of 1.4 mg/dL).    Diagnostic Results:  I have reviewed and interpreted all pertinent imaging results/findings within the past 24 hours.

## 2022-12-03 NOTE — ASSESSMENT & PLAN NOTE
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    - Will plan increase Levothyroxine dose to 75 mcg daily  - If patient remains hospitalized in 1 week, we would recommend rechecking thyroid studies and if TSH is not in the goal range (4 to 7) reach out to endocrine service.      **If patient discharges we have upcoming appointment in the office on January 3rd and will plan repeat thyroid studies before that visit.  He would need to discharge on the new increased dose of Levothyroxine (75 mcg daily).      ** Endocrine will sign off for now but if still hospitalized in 1 week, recheck Free T4 and TSH.  If concerns reach out to endocine fellow on call.

## 2022-12-03 NOTE — NURSING
Patient is ready for discharge. Patient stable alert and oriented. LVAD supplies inventoried in flowsheet. Patient connected himself to full battery pack. Seroma to right groin, dressing in place, no drainage noted to site. Denies any pain, peripheral pulses verified per doppler.    PICC line to stay in place, next dressing due on 12/5 per home health. No complaints of pain. Discussed discharge plan. Reviewed medications and side effects, appointments, and answered questions with patient and family. RX sent to [facility] in [place].      Patient to follow up with Dr. Kaufman on Monday or day this week. Desirae VAD coordinator notified of patient's discharge and need for follow up.

## 2022-12-03 NOTE — DISCHARGE SUMMARY
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      Post-op course was prolonged and complicated by worsening cardiogenic shock/RV failure requiring VA-ECMO that was successfully decannulated and Klebsiella aerogenes pneumonia. He also had episodes of VT with ICD discharge 7/21 requiring amio and NSVT episodes requiring addition of lidocaine gtt. This was transitioned to PO amiodarone and mexiletine. He unfortunately required re-intubation 7/29 for hypercapneic respiratory failure after initial extubation 7/27 and developed Aflutter with RVR with drop in PI and BP requiring DCCV. He ultimately underwent tracheostomy 8/4 and weaned off Epi 8/8. He briefly required TPN. He was transitioned back to enteral feeding and subsequently improved from a swallow standpoint and tolerated regular diet. He was having issues with vertigo-like symptoms with unremarkable ENT workup and negative CT heads. Due to higher prolonged doses of mexiletine during hospitalization, this was reduced to 250 mg TID. Amiodarone was adjusted after note of some CT liver parenchyma attenuation with report of copper/iron/med deposition but due to reduction of mexiletine, this was increased to 400 mg daily. The CT had also shown possible signs suggestive of avascular necrosis of the femoral head and discussion with Endocrinology was held to wean steroid dosing for  amiodarone-induced hyperthyroidism with recommendations for prednisone 5 mg daily to be continued through 8/31 then discontinued. He remained on methimazole. At the time of discharge, he had a size 8 cuffed Shiley trach which was subsequently removed.      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 He is seen by both vascular surgery and ID for this. He saw ID 1 week prior. He is currently on ertapenem, planned for a total of 10 wk course, and voriconazole which they plan to continue for an extended period of time.     He was discharged on 9/1/2022. Has done well since.     He was seen in clinic most recently one month prior by Dr Bautista and presents today for follow up.     Overall from a cardiac standpoint he is doing well.  Denies any leg swelling, chest discomfort, shortness of breath, palpitations, presyncope, syncope, PND, or orthopnea.  Did have a few falls at home when he stood up too quickly, but no syncopal events per se.    * No surgery found *     Hospital Course: Patient was admitted at the request of ID after US right groin showed right inguinal SC cystic structure favored to represent a seroma or lymphocele.No drainage was coming from the likely seroma, has a very surface skin abrasion on top of the seroma. ID was consulted and rec that the Erta and Voriconazole be continued. He did mention some DLES drainage. Cultures done and show NGTD as do his admit blood cultures. No F/C or leukocytosis. IR, Vascular Surgery and Plastics were consulted but the decision was made by Dr. Rhodes to have patient seen by Dr. Kaufman in CTS first. Patient  elected to do that as an outpatient, so we will try to set him up with Dr. Kaufman on Monday if possible or one day next wee. Endocrine was consulted 2/2 hypothyroidism. Synthroid increased to 75 mcg po qd. He was discharged home to resume home health    Consults (From admission, onward)          Status Ordering Provider     Inpatient consult to Endocrinology  Once        Provider:  (Not yet assigned)    Completed ROSS GRANGER     Inpatient consult to Infectious Diseases  Once        Provider:  (Not yet assigned)    Completed ROSS GRANGER     Inpatient consult to Interventional Radiology  Once        Provider:  (Not yet assigned)    Completed ROSS GRANGER     Inpatient consult to Plastic Surgery  Once        Provider:  (Not yet assigned)    Acknowledged ROSS GRANGER            Significant Diagnostic Studies: See above    Pending Diagnostic Studies:       Procedure Component Value Units Date/Time    IR Abscess Drainage With Tube Placement [[id number]]     Order Status: Sent Lab Status: No result           Final Active Diagnoses:    Diagnosis Date Noted POA    PRINCIPAL PROBLEM:  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] 09/25/2022 Yes    Postprocedural seroma of a circulatory system organ or structure following other circulatory system procedure [I97.648] 12/03/2022 Unknown    Other specified hypothyroidism [E03.8] 12/03/2022 Unknown    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    Specialties: Cardiothoracic Surgery, Transplant  Why: Will schedule f/u with Dr. Kaufamn  on Monday if possible, otherwise another day next week  Contact information:  [address]  [facility] [id number]  [phone]                           Patient Instructions:      Diet Cardiac   Order Comments: Coumadin dietary restrictions, 2000 cc fluid restriction     Activity as tolerated     Medications:  Reconciled Home Medications:      Medication List        START taking these medications      amLODIPine 5 MG tablet  Commonly known as: NORVASC  Take 1 tablet (5 mg total) by mouth once daily.  Start taking on: December 4, 2022            CHANGE how you take these medications      levothyroxine 75 MCG tablet  Commonly known as: SYNTHROID  Take 1 tablet (75 mcg total) by mouth before breakfast.  What changed:   medication strength  how much to take            CONTINUE taking these medications      ALPRAZolam 1 MG tablet  Commonly known as: XANAX  Take 0.5-1 tablets (0.5-1 mg total) by mouth 2 (two) times daily as needed for Anxiety.     amiodarone 200 MG Tab  Commonly known as: PACERONE  Take 2 tablets (400 mg total) by mouth once daily.     ferrous gluconate 324 MG tablet  Commonly known as: FERGON  Take 1 tablet (324 mg total) by mouth 2 (two) times daily with meals.     magnesium oxide 400 mg (241.3 mg magnesium) tablet  Commonly known as: MAG-OX  Take 1 tablet (400 mg total) by mouth 2 (two) times daily.     mexiletine 250 MG Cap  Commonly known as: MEXITIL  Take 1 capsule (250 mg total) by mouth every 8 (eight) hours.     mirtazapine 30 MG tablet  Commonly known as: REMERON  Take 1 tablet (30 mg total) by mouth every evening.     omega-3 acid ethyl esters 1 gram capsule  Commonly known as: LOVAZA  Take 2 capsules (2 g total) by mouth 2 (two) times daily.     pantoprazole 40 MG tablet  Commonly known as: PROTONIX  Take 1 tablet (40 mg total) by mouth daily with lunch.     sodium chloride 0.9% SolP 100 mL with ertapenem 1 gram SolR 1 g  Inject 1 g into the vein once daily.     voriconazole 200 MG  Tab  Commonly known as: VFEND  Take 1 tablet (200 mg total) by mouth 2 (two) times daily. Take 400 mg (2 tabs) by mouth twice a day for 1 day then 200 mg (1 tab) twice a day     warfarin 5 MG tablet  Commonly known as: COUMADIN  Take a half tablet (2.5mg) by mouth on 10/5 only. Then take 1 tablet (5mg) daily              Maira Crum NP [id number]  Heart Transplant  [facility] - Cardiology Stepdown

## 2022-12-03 NOTE — NURSING
"LVAD dressing change completed using sterile technique with kit. DLES is a "2_" with nodrainage noted on the drain sponge, ordered cultures obtained and sent to lab. Tolerated without any complication. Sutures remain intact, no redness, or tenderness noted.    "

## 2022-12-05 ENCOUNTER — OFFICE VISIT (OUTPATIENT)
Dept: CARDIOTHORACIC SURGERY | Facility: CLINIC | Age: 57
End: 2022-12-05
Payer: MEDICARE

## 2022-12-05 ENCOUNTER — TELEPHONE (OUTPATIENT)
Dept: CARDIOTHORACIC SURGERY | Facility: CLINIC | Age: 57
End: 2022-12-05
Payer: MEDICARE

## 2022-12-05 VITALS
RESPIRATION RATE: 18 BRPM | BODY MASS INDEX: 26.77 KG/M2 | SYSTOLIC BLOOD PRESSURE: 80 MMHG | WEIGHT: 202 LBS | HEIGHT: 73 IN

## 2022-12-05 DIAGNOSIS — Z95.811 LVAD (LEFT VENTRICULAR ASSIST DEVICE) PRESENT: Primary | ICD-10-CM

## 2022-12-05 LAB — BACTERIA SPEC AEROBE CULT: NO GROWTH

## 2022-12-05 PROCEDURE — 1159F MED LIST DOCD IN RCRD: CPT | Mod: CPTII,S$GLB,, | Performed by: THORACIC SURGERY (CARDIOTHORACIC VASCULAR SURGERY)

## 2022-12-05 PROCEDURE — 1160F RVW MEDS BY RX/DR IN RCRD: CPT | Mod: CPTII,S$GLB,, | Performed by: THORACIC SURGERY (CARDIOTHORACIC VASCULAR SURGERY)

## 2022-12-05 PROCEDURE — 3008F BODY MASS INDEX DOCD: CPT | Mod: CPTII,S$GLB,, | Performed by: THORACIC SURGERY (CARDIOTHORACIC VASCULAR SURGERY)

## 2022-12-05 PROCEDURE — 99215 OFFICE O/P EST HI 40 MIN: CPT | Mod: S$GLB,,, | Performed by: THORACIC SURGERY (CARDIOTHORACIC VASCULAR SURGERY)

## 2022-12-05 PROCEDURE — 99999 PR PBB SHADOW E&M-EST. PATIENT-LVL II: CPT | Mod: PBBFAC,,, | Performed by: THORACIC SURGERY (CARDIOTHORACIC VASCULAR SURGERY)

## 2022-12-05 PROCEDURE — 99999 PR PBB SHADOW E&M-EST. PATIENT-LVL II: ICD-10-PCS | Mod: PBBFAC,,, | Performed by: THORACIC SURGERY (CARDIOTHORACIC VASCULAR SURGERY)

## 2022-12-05 PROCEDURE — 99215 PR OFFICE/OUTPT VISIT, EST, LEVL V, 40-54 MIN: ICD-10-PCS | Mod: S$GLB,,, | Performed by: THORACIC SURGERY (CARDIOTHORACIC VASCULAR SURGERY)

## 2022-12-05 PROCEDURE — 3008F PR BODY MASS INDEX (BMI) DOCUMENTED: ICD-10-PCS | Mod: CPTII,S$GLB,, | Performed by: THORACIC SURGERY (CARDIOTHORACIC VASCULAR SURGERY)

## 2022-12-05 PROCEDURE — 1159F PR MEDICATION LIST DOCUMENTED IN MEDICAL RECORD: ICD-10-PCS | Mod: CPTII,S$GLB,, | Performed by: THORACIC SURGERY (CARDIOTHORACIC VASCULAR SURGERY)

## 2022-12-05 PROCEDURE — 1160F PR REVIEW ALL MEDS BY PRESCRIBER/CLIN PHARMACIST DOCUMENTED: ICD-10-PCS | Mod: CPTII,S$GLB,, | Performed by: THORACIC SURGERY (CARDIOTHORACIC VASCULAR SURGERY)

## 2022-12-05 NOTE — TELEPHONE ENCOUNTER
Called and scheduled pt to see Dr. Kaufman in clinic today to assess fluid collection to his right groin, per request from HTS.  Pt provided with appt time and location, which he verbalized understanding to.

## 2022-12-05 NOTE — PROGRESS NOTES
Admitted 12/2-12/3. Hospital Course: Patient was admitted at the request of ID after US right groin showed right inguinal SC cystic structure favored to represent a seroma or lymphocele.No drainage was coming from the likely seroma, has a very surface skin abrasion on top of the seroma. ID was consulted and rec that the Erta and Voriconazole be continued. He did mention some DLES drainage. Cultures done and show NGTD as do his admit blood cultures. No F/C or leukocytosis. IR, Vascular Surgery and Plastics were consulted but the decision was made by Dr. Rhodes to have patient seen by Dr. Kaufman in CTS first. Patient elected to do that as an outpatient, so we will try to set him up with Dr. Kaufman on Monday if possible or one day next wee. Endocrine was consulted 2/2 hypothyroidism. Synthroid increased to 75 mcg po qd. He was discharged home to resume home health

## 2022-12-05 NOTE — PROGRESS NOTES
Patient states he was discharged home from hospital on Coumadin 5 mg daily and he declined 12/6/22 INR which was rescheduled for 12/5/22 at [facility] lab appointment as patient will be at this location for other appointment.

## 2022-12-05 NOTE — PROGRESS NOTES
Subjective:      Patient ID: Tim Richards is a 57 y.o. male.    Chief Complaint: No chief complaint on file.      HPI:  Tim Richards is a 57 y.o. male who presents as a follow up for right groin swelling.   He has a medical history significant for HFrEF s/p LVAD placement with complication of infected Right femoral artery pseudoaneurysms/mycotic aneurysms of his  ecmo cannulation s/p R groin exploration with explant of infected graft, creation of ileofem bypass and graft flap. PT developed new Rt groin swelling and US showed 5.6 cm seroma vs lymphocele with no evidence of communication with the femoral artery graft.  Cultures taken, NGTD. Patient denies pain.     Current medications Reviewed    Review of Systems   Constitutional:  Negative for activity change, appetite change, fatigue and fever.   HENT:  Negative for nosebleeds.    Respiratory:  Negative for cough and shortness of breath.    Cardiovascular:  Negative for chest pain, palpitations and leg swelling.   Gastrointestinal:  Negative for abdominal distention, abdominal pain and nausea.   Genitourinary:  Negative for frequency.   Musculoskeletal:  Negative for arthralgias and myalgias.   Skin:  Negative for rash.   Neurological:  Negative for dizziness and numbness.   Hematological:  Does not bruise/bleed easily.   Objective:   Physical Exam  HENT:      Head: Normocephalic.   Eyes:      Extraocular Movements: Extraocular movements intact.   Cardiovascular:      Rate and Rhythm: Normal rate and regular rhythm.      Comments: LVAD hum is smooth  Pulmonary:      Effort: Pulmonary effort is normal.      Breath sounds: Normal breath sounds.   Abdominal:      General: Abdomen is flat.      Palpations: Abdomen is soft.   Skin:     General: Skin is warm and dry.   Neurological:      General: No focal deficit present.     Diagnotic Results:  CT Reviewed  Assessment:   S/p LVAD   Plan:   No need for intervention for the fluid collection, however will  recommend serial evaluation and intervene if collection increases or any signs of infection or inflammation.

## 2022-12-06 ENCOUNTER — LAB VISIT (OUTPATIENT)
Dept: LAB | Facility: HOSPITAL | Age: 57
End: 2022-12-06
Attending: INTERNAL MEDICINE
Payer: MEDICARE

## 2022-12-06 DIAGNOSIS — I13.0 HYPERTENSIVE HEART AND RENAL DISEASE WITH CONGESTIVE HEART FAILURE: ICD-10-CM

## 2022-12-06 DIAGNOSIS — Z79.01 LONG TERM (CURRENT) USE OF ANTICOAGULANTS: Primary | ICD-10-CM

## 2022-12-06 DIAGNOSIS — I48.91 ATRIAL FIBRILLATION: ICD-10-CM

## 2022-12-06 DIAGNOSIS — I50.43 ACUTE ON CHRONIC COMBINED SYSTOLIC AND DIASTOLIC HEART FAILURE: ICD-10-CM

## 2022-12-06 LAB
ALBUMIN SERPL BCP-MCNC: 2.7 G/DL (ref 3.5–5.2)
ALP SERPL-CCNC: 80 U/L (ref 55–135)
ALT SERPL W/O P-5'-P-CCNC: 31 U/L (ref 10–44)
ANION GAP SERPL CALC-SCNC: 8 MMOL/L (ref 8–16)
AST SERPL-CCNC: 64 U/L (ref 10–40)
BACTERIA SPEC AEROBE CULT: NO GROWTH
BASOPHILS # BLD AUTO: 0.01 K/UL (ref 0–0.2)
BASOPHILS NFR BLD: 0.3 % (ref 0–1.9)
BILIRUB SERPL-MCNC: 0.2 MG/DL (ref 0.1–1)
BUN SERPL-MCNC: 19 MG/DL (ref 6–20)
CALCIUM SERPL-MCNC: 8.7 MG/DL (ref 8.7–10.5)
CHLORIDE SERPL-SCNC: 107 MMOL/L (ref 95–110)
CO2 SERPL-SCNC: 26 MMOL/L (ref 23–29)
CREAT SERPL-MCNC: 1.4 MG/DL (ref 0.5–1.4)
DIFFERENTIAL METHOD: ABNORMAL
EOSINOPHIL # BLD AUTO: 0.2 K/UL (ref 0–0.5)
EOSINOPHIL NFR BLD: 5.2 % (ref 0–8)
ERYTHROCYTE [DISTWIDTH] IN BLOOD BY AUTOMATED COUNT: 17.2 % (ref 11.5–14.5)
EST. GFR  (NO RACE VARIABLE): 59 ML/MIN/1.73 M^2
GLUCOSE SERPL-MCNC: 52 MG/DL (ref 70–110)
HCT VFR BLD AUTO: 30.8 % (ref 40–54)
HGB BLD-MCNC: 9.1 G/DL (ref 14–18)
IMM GRANULOCYTES # BLD AUTO: 0.01 K/UL (ref 0–0.04)
IMM GRANULOCYTES NFR BLD AUTO: 0.3 % (ref 0–0.5)
INR PPP: 2.3 (ref 0.8–1.2)
LYMPHOCYTES # BLD AUTO: 0.9 K/UL (ref 1–4.8)
LYMPHOCYTES NFR BLD: 25.2 % (ref 18–48)
MCH RBC QN AUTO: 25.8 PG (ref 27–31)
MCHC RBC AUTO-ENTMCNC: 29.5 G/DL (ref 32–36)
MCV RBC AUTO: 87 FL (ref 82–98)
MONOCYTES # BLD AUTO: 0.5 K/UL (ref 0.3–1)
MONOCYTES NFR BLD: 13.2 % (ref 4–15)
NEUTROPHILS # BLD AUTO: 2 K/UL (ref 1.8–7.7)
NEUTROPHILS NFR BLD: 55.8 % (ref 38–73)
NRBC BLD-RTO: 0 /100 WBC
PLATELET # BLD AUTO: 265 K/UL (ref 150–450)
PMV BLD AUTO: 10.9 FL (ref 9.2–12.9)
POTASSIUM SERPL-SCNC: 4 MMOL/L (ref 3.5–5.1)
PROT SERPL-MCNC: 6.9 G/DL (ref 6–8.4)
PROTHROMBIN TIME: 23.7 SEC (ref 9–12.5)
RBC # BLD AUTO: 3.53 M/UL (ref 4.6–6.2)
SODIUM SERPL-SCNC: 141 MMOL/L (ref 136–145)
WBC # BLD AUTO: 3.65 K/UL (ref 3.9–12.7)

## 2022-12-06 PROCEDURE — 80053 COMPREHEN METABOLIC PANEL: CPT | Performed by: INTERNAL MEDICINE

## 2022-12-06 PROCEDURE — 85025 COMPLETE CBC W/AUTO DIFF WBC: CPT | Performed by: INTERNAL MEDICINE

## 2022-12-06 PROCEDURE — 85610 PROTHROMBIN TIME: CPT | Performed by: INTERNAL MEDICINE

## 2022-12-06 NOTE — PROGRESS NOTES
12/6/22-I Spoke to patient regarding missed INR on 12/5/22. Patient stated he didn't have INR on 12/5/22 because [facility] is coming to draw INR today. I called [facility] and spoke with Gricel and she stated they will draw PT INR today and fax lab results to us.

## 2022-12-07 ENCOUNTER — ANTI-COAG VISIT (OUTPATIENT)
Dept: CARDIOLOGY | Facility: CLINIC | Age: 57
End: 2022-12-07
Payer: MEDICARE

## 2022-12-07 ENCOUNTER — TELEPHONE (OUTPATIENT)
Dept: INFECTIOUS DISEASES | Facility: CLINIC | Age: 57
End: 2022-12-07
Payer: MEDICARE

## 2022-12-07 LAB
BACTERIA BLD CULT: NORMAL
BACTERIA BLD CULT: NORMAL

## 2022-12-07 PROCEDURE — 93793 PR ANTICOAGULANT MGMT FOR PT TAKING WARFARIN: ICD-10-PCS | Mod: S$GLB,,,

## 2022-12-07 PROCEDURE — 93793 ANTICOAG MGMT PT WARFARIN: CPT | Mod: S$GLB,,,

## 2022-12-07 NOTE — TELEPHONE ENCOUNTER
Called Ryan with [facility],   Gave verbal orders to extend pt's IV antibiotics until 12/21/22.  Her verbalized understanding.

## 2022-12-07 NOTE — TELEPHONE ENCOUNTER
----- Message from Roxie Garg MD sent at 12/7/2022 12:15 PM CST -----  Yes please    ----- Message -----  From: Brenda Samuels MA  Sent: 12/7/2022  11:51 AM CST  To: Roxie Garg MD    He's schedule to see her on 12/21. Ok to extend until then?  ----- Message -----  From: Roxie Garg MD  Sent: 12/7/2022  11:43 AM CST  To: Brenda Samuels MA    I would extend and have patient follow up with Dr. Perez     ----- Message -----  From: Brenda Samuels MA  Sent: 12/7/2022   9:42 AM CST  To: MD Dr Ana Nicholas's pt  Pt is schedule to end his IV antibiotics today.  Please advice

## 2022-12-07 NOTE — PROGRESS NOTES
Patient labs reviewed, WNL for patient baseline, no changes at this time, will continue to monitor.

## 2022-12-08 ENCOUNTER — ANTI-COAG VISIT (OUTPATIENT)
Dept: CARDIOLOGY | Facility: CLINIC | Age: 57
End: 2022-12-08
Payer: MEDICARE

## 2022-12-08 LAB — INR PPP: 2.5

## 2022-12-08 PROCEDURE — 93793 PR ANTICOAGULANT MGMT FOR PT TAKING WARFARIN: ICD-10-PCS | Mod: S$GLB,,,

## 2022-12-08 PROCEDURE — 93793 ANTICOAG MGMT PT WARFARIN: CPT | Mod: S$GLB,,,

## 2022-12-09 LAB — BACTERIA SPEC ANAEROBE CULT: NORMAL

## 2022-12-10 ENCOUNTER — DOCUMENT SCAN (OUTPATIENT)
Dept: HOME HEALTH SERVICES | Facility: HOSPITAL | Age: 57
End: 2022-12-10
Payer: MEDICARE

## 2022-12-12 ENCOUNTER — TELEPHONE (OUTPATIENT)
Dept: PHYSICAL MEDICINE AND REHAB | Facility: CLINIC | Age: 57
End: 2022-12-12
Payer: MEDICARE

## 2022-12-12 ENCOUNTER — LAB VISIT (OUTPATIENT)
Dept: LAB | Facility: HOSPITAL | Age: 57
End: 2022-12-12
Attending: INTERNAL MEDICINE
Payer: MEDICARE

## 2022-12-12 DIAGNOSIS — I13.0 HYPERTENSIVE HEART AND RENAL DISEASE WITH CONGESTIVE HEART FAILURE: Primary | ICD-10-CM

## 2022-12-12 DIAGNOSIS — N18.32 CHRONIC KIDNEY DISEASE (CKD) STAGE G3B/A1, MODERATELY DECREASED GLOMERULAR FILTRATION RATE (GFR) BETWEEN 30-44 ML/MIN/1.73 SQUARE METER AND ALBUMINURIA CREATININE RATIO LESS THAN 30 MG/G: ICD-10-CM

## 2022-12-12 DIAGNOSIS — Z79.01 LONG TERM (CURRENT) USE OF ANTICOAGULANTS: ICD-10-CM

## 2022-12-12 DIAGNOSIS — I50.43 ACUTE ON CHRONIC COMBINED SYSTOLIC AND DIASTOLIC HEART FAILURE: ICD-10-CM

## 2022-12-12 LAB
ALBUMIN SERPL BCP-MCNC: 2.6 G/DL (ref 3.5–5.2)
ALP SERPL-CCNC: 69 U/L (ref 55–135)
ALT SERPL W/O P-5'-P-CCNC: 12 U/L (ref 10–44)
ANION GAP SERPL CALC-SCNC: 8 MMOL/L (ref 8–16)
AST SERPL-CCNC: 29 U/L (ref 10–40)
BACTERIA SPEC ANAEROBE CULT: ABNORMAL
BASOPHILS # BLD AUTO: 0.01 K/UL (ref 0–0.2)
BASOPHILS NFR BLD: 0.3 % (ref 0–1.9)
BILIRUB SERPL-MCNC: 0.2 MG/DL (ref 0.1–1)
BUN SERPL-MCNC: 12 MG/DL (ref 6–20)
CALCIUM SERPL-MCNC: 8.4 MG/DL (ref 8.7–10.5)
CHLORIDE SERPL-SCNC: 106 MMOL/L (ref 95–110)
CO2 SERPL-SCNC: 26 MMOL/L (ref 23–29)
CREAT SERPL-MCNC: 1.2 MG/DL (ref 0.5–1.4)
DIFFERENTIAL METHOD: ABNORMAL
EOSINOPHIL # BLD AUTO: 0.2 K/UL (ref 0–0.5)
EOSINOPHIL NFR BLD: 6 % (ref 0–8)
ERYTHROCYTE [DISTWIDTH] IN BLOOD BY AUTOMATED COUNT: 16.9 % (ref 11.5–14.5)
EST. GFR  (NO RACE VARIABLE): >60 ML/MIN/1.73 M^2
GLUCOSE SERPL-MCNC: 59 MG/DL (ref 70–110)
HCT VFR BLD AUTO: 28.6 % (ref 40–54)
HGB BLD-MCNC: 8.4 G/DL (ref 14–18)
IMM GRANULOCYTES # BLD AUTO: 0.01 K/UL (ref 0–0.04)
IMM GRANULOCYTES NFR BLD AUTO: 0.3 % (ref 0–0.5)
INR PPP: 2.4 (ref 0.8–1.2)
LYMPHOCYTES # BLD AUTO: 1 K/UL (ref 1–4.8)
LYMPHOCYTES NFR BLD: 28.7 % (ref 18–48)
MCH RBC QN AUTO: 25.4 PG (ref 27–31)
MCHC RBC AUTO-ENTMCNC: 29.4 G/DL (ref 32–36)
MCV RBC AUTO: 86 FL (ref 82–98)
MONOCYTES # BLD AUTO: 0.5 K/UL (ref 0.3–1)
MONOCYTES NFR BLD: 13.4 % (ref 4–15)
NEUTROPHILS # BLD AUTO: 1.8 K/UL (ref 1.8–7.7)
NEUTROPHILS NFR BLD: 51.3 % (ref 38–73)
NRBC BLD-RTO: 0 /100 WBC
PLATELET # BLD AUTO: 268 K/UL (ref 150–450)
PMV BLD AUTO: 11 FL (ref 9.2–12.9)
POTASSIUM SERPL-SCNC: 4.1 MMOL/L (ref 3.5–5.1)
PROT SERPL-MCNC: 6.6 G/DL (ref 6–8.4)
PROTHROMBIN TIME: 25.1 SEC (ref 9–12.5)
RBC # BLD AUTO: 3.31 M/UL (ref 4.6–6.2)
SODIUM SERPL-SCNC: 140 MMOL/L (ref 136–145)
WBC # BLD AUTO: 3.52 K/UL (ref 3.9–12.7)

## 2022-12-12 PROCEDURE — 85610 PROTHROMBIN TIME: CPT | Performed by: INTERNAL MEDICINE

## 2022-12-12 PROCEDURE — 80053 COMPREHEN METABOLIC PANEL: CPT | Performed by: INTERNAL MEDICINE

## 2022-12-12 PROCEDURE — 85025 COMPLETE CBC W/AUTO DIFF WBC: CPT | Performed by: INTERNAL MEDICINE

## 2022-12-12 NOTE — PROGRESS NOTES
Hi Dr. Perez, this culture came back, the patient is on vericonozole, should he continue this or does it need to change?

## 2022-12-12 NOTE — TELEPHONE ENCOUNTER
----- Message from Dorothy Bruno sent at 12/12/2022  3:14 PM CST -----  Regarding: Home Health  Good afternoon,     We [facility].  Please reach out to Ms Monsalve with [facility] her phone number is [phone], Fax : [phone]    Thank you

## 2022-12-13 ENCOUNTER — ANTI-COAG VISIT (OUTPATIENT)
Dept: CARDIOLOGY | Facility: CLINIC | Age: 57
End: 2022-12-13
Payer: MEDICARE

## 2022-12-13 ENCOUNTER — DOCUMENT SCAN (OUTPATIENT)
Dept: HOME HEALTH SERVICES | Facility: HOSPITAL | Age: 57
End: 2022-12-13
Payer: MEDICARE

## 2022-12-13 PROCEDURE — 93793 PR ANTICOAGULANT MGMT FOR PT TAKING WARFARIN: ICD-10-PCS | Mod: S$GLB,,,

## 2022-12-13 PROCEDURE — 93793 ANTICOAG MGMT PT WARFARIN: CPT | Mod: S$GLB,,,

## 2022-12-13 NOTE — PROGRESS NOTES
Gricel with [facility] states they go in on Mondays for wound dressings to the pt house, she would like to know if its ok for them to go on 12/19 instead of 12/21. They will need someone who is skilled in drawing labs to do it.

## 2022-12-15 RX ORDER — MEXILETINE HYDROCHLORIDE 250 MG/1
250 CAPSULE ORAL EVERY 8 HOURS
Qty: 90 CAPSULE | Refills: 11 | Status: SHIPPED | OUTPATIENT
Start: 2022-12-15 | End: 2023-10-24 | Stop reason: SDUPTHER

## 2022-12-16 NOTE — PROGRESS NOTES
Date of Implant with Heartmate 3 LVAD: 7/13/22 (exchanged)    PATIENT ARRIVED IN CLINIC:  Ambulatory with rolling walker  Accompanied by: self  Complaints/reason for visit today: routine    Vitals  Temperature, oral:   Temp Readings from Last 1 Encounters:   12/03/22 97.8 °F (36.6 °C) (Oral)     Blood Pressure:   BP Readings from Last 3 Encounters:   12/05/22 (!) 80/0   12/03/22 108/82   12/01/22 (!) 76/0        VAD Interrogation:  TXP SACHI INTERROGATIONS 12/3/2022 12/3/2022 12/2/2022   Type - - -   Flow - - -   Speed - - -   PI - - -   Power ([place]) - - -   LSL - - -   Pulsatility No Pulse No Pulse No Pulse     HCT:   Lab Results   Component Value Date    HCT 28.6 (L) 12/12/2022    HCT 31 (L) 09/27/2022       History Log: RBB  Problems / Issues / Alarms with VAD if any: None noted  VAD Sounds: HM3 Smooth    VAD Binder With Patient: no   Reviewed VAD Numbers In Binder: no  Enrolled in Care Companion: no    Equipment:  Emergency Equipment With Patient: yes   Any Equipment Issues: None noted   It is medically necessary to ensure patient has properly functioning equipment and wearables to prevent infection, injury or death to patient.     DLES Assessment:  Appearance Of Driveline: 2 with drainage, culture taken, messaged Dr. Younger      Antibiotics: YES ertapenem and voriconazole  Velour: no  Manual & Visual Inspection Of Driveline: No kinks or tears noted  Stabilization Device In Use: yes, sun securement device    Heartmate 3 Module Cable:  No yellow exposed and Attempted to unscrew modular cable to ensure it will be able to come lose in the event we ever need to change the modular cable while patient held the driveline in place so it would not move. Modular cable connection able to be unscrewed and re-tightened. Instructed pt to perform this weekly.    Patient MyChart Questionnaire:        Assessment:   PAIN: YES. Tylenol   Complaints Of Nausea / Vomiting: None noted    Appearance and Frequency Of Stools: normal  and formed without blood & daily  Color Of Urine: clear/yellow  Coping/Depression/Anxiety: coping okay, angry, and depressed  Sleep Habits: 8-10 hrs /night  Sleep Aids: None noted  Showering: Yes, reminded to change dressing immediately after drying off  Activity/Exercise: therapy, walking myself. Exercises at home   Driving: Yes. Reminded to pull over should there be an alarm before looking down at controller.    DLES Dressing Care:   Frequency of Dressing Changes: daily & daily kit  Pt In Need Of Management Kits?:yes -   1 Box of daily kit  It is medically necessary to have VAD management kits in order to prevent infection or to assist in the healing of an infected DLES.    Labs:    Chemistry        Component Value Date/Time     12/12/2022 0857    K 4.1 12/12/2022 0857     12/12/2022 0857    CO2 26 12/12/2022 0857    BUN 12 12/12/2022 0857    CREATININE 1.2 12/12/2022 0857    GLU 59 (L) 12/12/2022 0857        Component Value Date/Time    CALCIUM 8.4 (L) 12/12/2022 0857    ALKPHOS 69 12/12/2022 0857    AST 29 12/12/2022 0857    ALT 12 12/12/2022 0857    BILITOT 0.2 12/12/2022 0857    ESTGFRAFRICA 37.6 (A) 07/31/2022 2027    EGFRNONAA 32.5 (A) 07/31/2022 2027            Magnesium   Date Value Ref Range Status   12/03/2022 1.9 1.6 - 2.6 mg/dL Final       Lab Results   Component Value Date    WBC 3.52 (L) 12/12/2022    HGB 8.4 (L) 12/12/2022    HCT 28.6 (L) 12/12/2022    MCV 86 12/12/2022     12/12/2022       Lab Results   Component Value Date    INR 2.4 (H) 12/12/2022    INR 2.5 12/08/2022    INR 2.3 (H) 12/06/2022       BNP   Date Value Ref Range Status   12/01/2022 309 (H) 0 - 99 pg/mL Final     Comment:     Values of less than 100 pg/ml are consistent with non-CHF populations.   11/03/2022 599 (H) 0 - 99 pg/mL Final     Comment:     Values of less than 100 pg/ml are consistent with non-CHF populations.   10/05/2022 201 (H) 0 - 99 pg/mL Final     Comment:     Values of less than 100 pg/ml are  consistent with non-CHF populations.       LD   Date Value Ref Range Status   12/03/2022 197 110 - 260 U/L Final     Comment:     Results are increased in hemolyzed samples.   12/01/2022 240 110 - 260 U/L Final     Comment:     Results are increased in hemolyzed samples.   11/03/2022 323 (H) 110 - 260 U/L Final     Comment:     Results are increased in hemolyzed samples.       Labs reviewed with patient: YES     Medication reconciliation: per MA.  Medication Detail updated today: no  Coumadin Managed by: [facility]    Education: Reviewed driveline care, emergency procedures, how to change the controller, alarms with patient, as well as discussed how to page the VAD coordinator in case of an emergency. Educated pt/family that chest compressions are allowed in the event they are needed.    Covid - 19 education: Reminded patient/caregiver to check temperature daily and call us if it is > 99.0.  Reminded them  to stay 6 feet away from other people, wash hands frequently, don't touch your face and stay home except to get labs, medications, and appts.    Covid Vaccine: Pt informed that we are encouraging all VAD patients to receive the COVID vaccine.  Informed pt that they can take tylenol but should avoid other NSAIDs.      Plans/Needs: Routine VAD f/u. Pt is also having an u/s of right groin today due to hard knot in that area. ID and plastics are concerned for fluid collection/ infection. Plastics did see in their clinic and tried to drain but there was no drainage. Dr. Younger called stating that if U/S revealed fluid, patient will need to be admitted. U/S was not resulted before myself and Dr. Rodriguez left for the day. Pt is aware that he will have to come back for admission if positive for fluid in right groin.     Pt is coping okay but is upset and angry with his outcome s/p VAD exchange. Pt is pushing himself to regain strength and gain weight.      RTC in one month with PFTs.     Hurricane Season:  No

## 2022-12-19 ENCOUNTER — ANTI-COAG VISIT (OUTPATIENT)
Dept: CARDIOLOGY | Facility: CLINIC | Age: 57
End: 2022-12-19
Payer: MEDICARE

## 2022-12-19 ENCOUNTER — LAB VISIT (OUTPATIENT)
Dept: LAB | Facility: HOSPITAL | Age: 57
End: 2022-12-19
Attending: INTERNAL MEDICINE
Payer: MEDICARE

## 2022-12-19 DIAGNOSIS — Z79.01 LONG TERM (CURRENT) USE OF ANTICOAGULANTS: ICD-10-CM

## 2022-12-19 DIAGNOSIS — Z95.811 LVAD (LEFT VENTRICULAR ASSIST DEVICE) PRESENT: ICD-10-CM

## 2022-12-19 DIAGNOSIS — N18.32 CHRONIC KIDNEY DISEASE (CKD) STAGE G3B/A1, MODERATELY DECREASED GLOMERULAR FILTRATION RATE (GFR) BETWEEN 30-44 ML/MIN/1.73 SQUARE METER AND ALBUMINURIA CREATININE RATIO LESS THAN 30 MG/G: Primary | ICD-10-CM

## 2022-12-19 DIAGNOSIS — I13.0 HYPERTENSIVE HEART AND RENAL DISEASE WITH CONGESTIVE HEART FAILURE: ICD-10-CM

## 2022-12-19 DIAGNOSIS — I50.43 ACUTE ON CHRONIC COMBINED SYSTOLIC AND DIASTOLIC HEART FAILURE: ICD-10-CM

## 2022-12-19 LAB
ALBUMIN SERPL BCP-MCNC: 2.7 G/DL (ref 3.5–5.2)
ALP SERPL-CCNC: 78 U/L (ref 55–135)
ALT SERPL W/O P-5'-P-CCNC: 12 U/L (ref 10–44)
ANION GAP SERPL CALC-SCNC: 11 MMOL/L (ref 8–16)
AST SERPL-CCNC: 26 U/L (ref 10–40)
BASOPHILS # BLD AUTO: 0.02 K/UL (ref 0–0.2)
BASOPHILS NFR BLD: 0.6 % (ref 0–1.9)
BILIRUB SERPL-MCNC: 0.2 MG/DL (ref 0.1–1)
BUN SERPL-MCNC: 14 MG/DL (ref 6–20)
CALCIUM SERPL-MCNC: 8.4 MG/DL (ref 8.7–10.5)
CHLORIDE SERPL-SCNC: 108 MMOL/L (ref 95–110)
CO2 SERPL-SCNC: 23 MMOL/L (ref 23–29)
CREAT SERPL-MCNC: 1.4 MG/DL (ref 0.5–1.4)
DIFFERENTIAL METHOD: ABNORMAL
EOSINOPHIL # BLD AUTO: 0.2 K/UL (ref 0–0.5)
EOSINOPHIL NFR BLD: 4.5 % (ref 0–8)
ERYTHROCYTE [DISTWIDTH] IN BLOOD BY AUTOMATED COUNT: 17.7 % (ref 11.5–14.5)
EST. GFR  (NO RACE VARIABLE): 59 ML/MIN/1.73 M^2
GLUCOSE SERPL-MCNC: 71 MG/DL (ref 70–110)
HCT VFR BLD AUTO: 28.8 % (ref 40–54)
HGB BLD-MCNC: 8.5 G/DL (ref 14–18)
IMM GRANULOCYTES # BLD AUTO: 0.01 K/UL (ref 0–0.04)
IMM GRANULOCYTES NFR BLD AUTO: 0.3 % (ref 0–0.5)
INR PPP: 2.8 (ref 0.8–1.2)
INR PPP: 2.8 (ref 0.8–1.2)
LYMPHOCYTES # BLD AUTO: 1.1 K/UL (ref 1–4.8)
LYMPHOCYTES NFR BLD: 31.8 % (ref 18–48)
MCH RBC QN AUTO: 26.1 PG (ref 27–31)
MCHC RBC AUTO-ENTMCNC: 29.5 G/DL (ref 32–36)
MCV RBC AUTO: 88 FL (ref 82–98)
MONOCYTES # BLD AUTO: 0.5 K/UL (ref 0.3–1)
MONOCYTES NFR BLD: 15.5 % (ref 4–15)
NEUTROPHILS # BLD AUTO: 1.6 K/UL (ref 1.8–7.7)
NEUTROPHILS NFR BLD: 47.3 % (ref 38–73)
NRBC BLD-RTO: 0 /100 WBC
PLATELET # BLD AUTO: 293 K/UL (ref 150–450)
PMV BLD AUTO: 10.2 FL (ref 9.2–12.9)
POTASSIUM SERPL-SCNC: 4.6 MMOL/L (ref 3.5–5.1)
PROT SERPL-MCNC: 6.7 G/DL (ref 6–8.4)
PROTHROMBIN TIME: 28.7 SEC (ref 9–12.5)
PROTHROMBIN TIME: 28.7 SEC (ref 9–12.5)
RBC # BLD AUTO: 3.26 M/UL (ref 4.6–6.2)
SODIUM SERPL-SCNC: 142 MMOL/L (ref 136–145)
WBC # BLD AUTO: 3.36 K/UL (ref 3.9–12.7)

## 2022-12-19 PROCEDURE — 93793 ANTICOAG MGMT PT WARFARIN: CPT | Mod: S$GLB,,,

## 2022-12-19 PROCEDURE — 93793 PR ANTICOAGULANT MGMT FOR PT TAKING WARFARIN: ICD-10-PCS | Mod: S$GLB,,,

## 2022-12-19 PROCEDURE — 85025 COMPLETE CBC W/AUTO DIFF WBC: CPT | Performed by: INTERNAL MEDICINE

## 2022-12-19 PROCEDURE — 85610 PROTHROMBIN TIME: CPT | Performed by: INTERNAL MEDICINE

## 2022-12-19 PROCEDURE — 80053 COMPREHEN METABOLIC PANEL: CPT | Performed by: INTERNAL MEDICINE

## 2022-12-20 NOTE — PROGRESS NOTES
Gricel with [facility] wanted to change patient appointment to 12/27 because they are due to go out that day.

## 2022-12-21 ENCOUNTER — TELEPHONE (OUTPATIENT)
Dept: INFECTIOUS DISEASES | Facility: CLINIC | Age: 57
End: 2022-12-21
Payer: MEDICARE

## 2022-12-21 ENCOUNTER — DOCUMENT SCAN (OUTPATIENT)
Dept: HOME HEALTH SERVICES | Facility: HOSPITAL | Age: 57
End: 2022-12-21
Payer: MEDICARE

## 2022-12-21 NOTE — TELEPHONE ENCOUNTER
Scheduled pt with Dr Perez on 1/6//23. Called pt to informed, no answer left a voice message    Called Anshul with [facility], gave orders to continue Iv antibiotics until 1/6/23. Her verbalized understanding.

## 2022-12-27 ENCOUNTER — LAB VISIT (OUTPATIENT)
Dept: LAB | Facility: HOSPITAL | Age: 57
End: 2022-12-27
Attending: INTERNAL MEDICINE
Payer: MEDICARE

## 2022-12-27 ENCOUNTER — ANTI-COAG VISIT (OUTPATIENT)
Dept: CARDIOLOGY | Facility: CLINIC | Age: 57
End: 2022-12-27
Payer: MEDICARE

## 2022-12-27 DIAGNOSIS — Z79.01 LONG TERM (CURRENT) USE OF ANTICOAGULANTS: ICD-10-CM

## 2022-12-27 DIAGNOSIS — I13.0 HYPERTENSIVE HEART AND RENAL DISEASE WITH CONGESTIVE HEART FAILURE: Primary | ICD-10-CM

## 2022-12-27 DIAGNOSIS — I50.43 ACUTE ON CHRONIC COMBINED SYSTOLIC AND DIASTOLIC HEART FAILURE: ICD-10-CM

## 2022-12-27 DIAGNOSIS — N18.32 CHRONIC KIDNEY DISEASE (CKD) STAGE G3B/A1, MODERATELY DECREASED GLOMERULAR FILTRATION RATE (GFR) BETWEEN 30-44 ML/MIN/1.73 SQUARE METER AND ALBUMINURIA CREATININE RATIO LESS THAN 30 MG/G: ICD-10-CM

## 2022-12-27 LAB
ALBUMIN SERPL BCP-MCNC: 2.9 G/DL (ref 3.5–5.2)
ALP SERPL-CCNC: 83 U/L (ref 55–135)
ALT SERPL W/O P-5'-P-CCNC: 17 U/L (ref 10–44)
ANION GAP SERPL CALC-SCNC: 10 MMOL/L (ref 8–16)
AST SERPL-CCNC: 31 U/L (ref 10–40)
BASOPHILS # BLD AUTO: 0.02 K/UL (ref 0–0.2)
BASOPHILS NFR BLD: 0.5 % (ref 0–1.9)
BILIRUB SERPL-MCNC: 0.3 MG/DL (ref 0.1–1)
BUN SERPL-MCNC: 16 MG/DL (ref 6–20)
CALCIUM SERPL-MCNC: 8.8 MG/DL (ref 8.7–10.5)
CHLORIDE SERPL-SCNC: 103 MMOL/L (ref 95–110)
CO2 SERPL-SCNC: 27 MMOL/L (ref 23–29)
CREAT SERPL-MCNC: 1.6 MG/DL (ref 0.5–1.4)
DIFFERENTIAL METHOD: ABNORMAL
EOSINOPHIL # BLD AUTO: 0.2 K/UL (ref 0–0.5)
EOSINOPHIL NFR BLD: 3.6 % (ref 0–8)
ERYTHROCYTE [DISTWIDTH] IN BLOOD BY AUTOMATED COUNT: 17.9 % (ref 11.5–14.5)
EST. GFR  (NO RACE VARIABLE): 50 ML/MIN/1.73 M^2
GLUCOSE SERPL-MCNC: 61 MG/DL (ref 70–110)
HCT VFR BLD AUTO: 32.3 % (ref 40–54)
HGB BLD-MCNC: 9.6 G/DL (ref 14–18)
IMM GRANULOCYTES # BLD AUTO: 0 K/UL (ref 0–0.04)
IMM GRANULOCYTES NFR BLD AUTO: 0 % (ref 0–0.5)
INR PPP: 2.8 (ref 0.8–1.2)
LYMPHOCYTES # BLD AUTO: 1.2 K/UL (ref 1–4.8)
LYMPHOCYTES NFR BLD: 28.5 % (ref 18–48)
MCH RBC QN AUTO: 26.7 PG (ref 27–31)
MCHC RBC AUTO-ENTMCNC: 29.7 G/DL (ref 32–36)
MCV RBC AUTO: 90 FL (ref 82–98)
MONOCYTES # BLD AUTO: 0.5 K/UL (ref 0.3–1)
MONOCYTES NFR BLD: 11.5 % (ref 4–15)
NEUTROPHILS # BLD AUTO: 2.3 K/UL (ref 1.8–7.7)
NEUTROPHILS NFR BLD: 55.9 % (ref 38–73)
NRBC BLD-RTO: 0 /100 WBC
PLATELET # BLD AUTO: 308 K/UL (ref 150–450)
PMV BLD AUTO: 11.2 FL (ref 9.2–12.9)
POTASSIUM SERPL-SCNC: 3.7 MMOL/L (ref 3.5–5.1)
PROT SERPL-MCNC: 7.2 G/DL (ref 6–8.4)
PROTHROMBIN TIME: 28.5 SEC (ref 9–12.5)
RBC # BLD AUTO: 3.59 M/UL (ref 4.6–6.2)
SODIUM SERPL-SCNC: 140 MMOL/L (ref 136–145)
WBC # BLD AUTO: 4.17 K/UL (ref 3.9–12.7)

## 2022-12-27 PROCEDURE — 93793 PR ANTICOAGULANT MGMT FOR PT TAKING WARFARIN: ICD-10-PCS | Mod: S$GLB,,,

## 2022-12-27 PROCEDURE — 85610 PROTHROMBIN TIME: CPT | Performed by: INTERNAL MEDICINE

## 2022-12-27 PROCEDURE — 80053 COMPREHEN METABOLIC PANEL: CPT | Performed by: INTERNAL MEDICINE

## 2022-12-27 PROCEDURE — 85025 COMPLETE CBC W/AUTO DIFF WBC: CPT | Performed by: INTERNAL MEDICINE

## 2022-12-27 PROCEDURE — 93793 ANTICOAG MGMT PT WARFARIN: CPT | Mod: S$GLB,,,

## 2022-12-28 ENCOUNTER — PATIENT MESSAGE (OUTPATIENT)
Dept: ENDOCRINOLOGY | Facility: CLINIC | Age: 57
End: 2022-12-28
Payer: MEDICARE

## 2022-12-28 ENCOUNTER — CLINICAL SUPPORT (OUTPATIENT)
Dept: CARDIOLOGY | Facility: HOSPITAL | Age: 57
End: 2022-12-28
Payer: MEDICARE

## 2022-12-28 ENCOUNTER — DOCUMENT SCAN (OUTPATIENT)
Dept: HOME HEALTH SERVICES | Facility: HOSPITAL | Age: 57
End: 2022-12-28
Payer: MEDICARE

## 2022-12-28 DIAGNOSIS — Z95.810 PRESENCE OF AUTOMATIC (IMPLANTABLE) CARDIAC DEFIBRILLATOR: ICD-10-CM

## 2022-12-28 DIAGNOSIS — I47.29 OTHER VENTRICULAR TACHYCARDIA: ICD-10-CM

## 2022-12-28 DIAGNOSIS — I42.0 DILATED CARDIOMYOPATHY: ICD-10-CM

## 2022-12-28 DIAGNOSIS — I50.42 CHRONIC COMBINED SYSTOLIC (CONGESTIVE) AND DIASTOLIC (CONGESTIVE) HEART FAILURE: ICD-10-CM

## 2022-12-28 PROCEDURE — 93295 DEV INTERROG REMOTE 1/2/MLT: CPT | Mod: ,,, | Performed by: INTERNAL MEDICINE

## 2022-12-28 PROCEDURE — 93295 CARDIAC DEVICE CHECK - REMOTE: ICD-10-PCS | Mod: ,,, | Performed by: INTERNAL MEDICINE

## 2022-12-28 PROCEDURE — 93296 REM INTERROG EVL PM/IDS: CPT | Performed by: INTERNAL MEDICINE

## 2022-12-29 ENCOUNTER — PATIENT MESSAGE (OUTPATIENT)
Dept: TRANSPLANT | Facility: CLINIC | Age: 57
End: 2022-12-29
Payer: MEDICARE

## 2022-12-29 ENCOUNTER — LAB VISIT (OUTPATIENT)
Dept: LAB | Facility: HOSPITAL | Age: 57
End: 2022-12-29
Attending: STUDENT IN AN ORGANIZED HEALTH CARE EDUCATION/TRAINING PROGRAM
Payer: MEDICARE

## 2022-12-29 DIAGNOSIS — E03.2 HYPOTHYROIDISM DUE TO AMIODARONE: ICD-10-CM

## 2022-12-29 DIAGNOSIS — T46.2X1A HYPOTHYROIDISM DUE TO AMIODARONE: ICD-10-CM

## 2022-12-29 LAB
T4 FREE SERPL-MCNC: 1.1 NG/DL (ref 0.71–1.51)
TSH SERPL DL<=0.005 MIU/L-ACNC: 12.22 UIU/ML (ref 0.4–4)

## 2022-12-29 PROCEDURE — 84439 ASSAY OF FREE THYROXINE: CPT | Performed by: STUDENT IN AN ORGANIZED HEALTH CARE EDUCATION/TRAINING PROGRAM

## 2022-12-29 PROCEDURE — 36415 COLL VENOUS BLD VENIPUNCTURE: CPT | Performed by: STUDENT IN AN ORGANIZED HEALTH CARE EDUCATION/TRAINING PROGRAM

## 2022-12-29 PROCEDURE — 84443 ASSAY THYROID STIM HORMONE: CPT | Performed by: STUDENT IN AN ORGANIZED HEALTH CARE EDUCATION/TRAINING PROGRAM

## 2023-01-03 ENCOUNTER — OFFICE VISIT (OUTPATIENT)
Dept: ENDOCRINOLOGY | Facility: CLINIC | Age: 58
End: 2023-01-03
Payer: MEDICARE

## 2023-01-03 ENCOUNTER — ANTI-COAG VISIT (OUTPATIENT)
Dept: CARDIOLOGY | Facility: CLINIC | Age: 58
End: 2023-01-03
Payer: MEDICARE

## 2023-01-03 ENCOUNTER — TELEPHONE (OUTPATIENT)
Dept: TRANSPLANT | Facility: CLINIC | Age: 58
End: 2023-01-03
Payer: MEDICARE

## 2023-01-03 ENCOUNTER — LAB VISIT (OUTPATIENT)
Dept: LAB | Facility: HOSPITAL | Age: 58
End: 2023-01-03
Attending: INTERNAL MEDICINE
Payer: MEDICARE

## 2023-01-03 ENCOUNTER — PATIENT MESSAGE (OUTPATIENT)
Dept: PSYCHIATRY | Facility: CLINIC | Age: 58
End: 2023-01-03
Payer: MEDICARE

## 2023-01-03 VITALS — WEIGHT: 218.94 LBS | BODY MASS INDEX: 28.88 KG/M2 | OXYGEN SATURATION: 99 % | RESPIRATION RATE: 16 BRPM

## 2023-01-03 DIAGNOSIS — N18.32 CHRONIC KIDNEY DISEASE (CKD) STAGE G3B/A1, MODERATELY DECREASED GLOMERULAR FILTRATION RATE (GFR) BETWEEN 30-44 ML/MIN/1.73 SQUARE METER AND ALBUMINURIA CREATININE RATIO LESS THAN 30 MG/G: ICD-10-CM

## 2023-01-03 DIAGNOSIS — Z79.01 LONG TERM (CURRENT) USE OF ANTICOAGULANTS: ICD-10-CM

## 2023-01-03 DIAGNOSIS — I50.43 ACUTE ON CHRONIC COMBINED SYSTOLIC AND DIASTOLIC HEART FAILURE: ICD-10-CM

## 2023-01-03 DIAGNOSIS — T46.2X1A HYPOTHYROIDISM DUE TO AMIODARONE: Primary | ICD-10-CM

## 2023-01-03 DIAGNOSIS — E05.80 AMIODARONE-INDUCED HYPERTHYROIDISM: ICD-10-CM

## 2023-01-03 DIAGNOSIS — E03.2 HYPOTHYROIDISM DUE TO AMIODARONE: Primary | ICD-10-CM

## 2023-01-03 DIAGNOSIS — T46.2X5A AMIODARONE-INDUCED HYPERTHYROIDISM: ICD-10-CM

## 2023-01-03 DIAGNOSIS — I13.0 HYPERTENSIVE HEART AND RENAL DISEASE WITH CONGESTIVE HEART FAILURE: Primary | ICD-10-CM

## 2023-01-03 DIAGNOSIS — Z95.811 LVAD (LEFT VENTRICULAR ASSIST DEVICE) PRESENT: ICD-10-CM

## 2023-01-03 LAB
ALBUMIN SERPL BCP-MCNC: 3.1 G/DL (ref 3.5–5.2)
ALP SERPL-CCNC: 72 U/L (ref 55–135)
ALT SERPL W/O P-5'-P-CCNC: 27 U/L (ref 10–44)
ANION GAP SERPL CALC-SCNC: 6 MMOL/L (ref 8–16)
AST SERPL-CCNC: 39 U/L (ref 10–40)
BASOPHILS # BLD AUTO: 0.02 K/UL (ref 0–0.2)
BASOPHILS NFR BLD: 0.3 % (ref 0–1.9)
BILIRUB SERPL-MCNC: 0.4 MG/DL (ref 0.1–1)
BUN SERPL-MCNC: 18 MG/DL (ref 6–20)
CALCIUM SERPL-MCNC: 8.9 MG/DL (ref 8.7–10.5)
CHLORIDE SERPL-SCNC: 105 MMOL/L (ref 95–110)
CO2 SERPL-SCNC: 26 MMOL/L (ref 23–29)
CREAT SERPL-MCNC: 1.9 MG/DL (ref 0.5–1.4)
DIFFERENTIAL METHOD: ABNORMAL
EOSINOPHIL # BLD AUTO: 0.1 K/UL (ref 0–0.5)
EOSINOPHIL NFR BLD: 1.6 % (ref 0–8)
ERYTHROCYTE [DISTWIDTH] IN BLOOD BY AUTOMATED COUNT: 17.7 % (ref 11.5–14.5)
EST. GFR  (NO RACE VARIABLE): 41 ML/MIN/1.73 M^2
GLUCOSE SERPL-MCNC: 102 MG/DL (ref 70–110)
HCT VFR BLD AUTO: 31.1 % (ref 40–54)
HGB BLD-MCNC: 9.2 G/DL (ref 14–18)
IMM GRANULOCYTES # BLD AUTO: 0.02 K/UL (ref 0–0.04)
IMM GRANULOCYTES NFR BLD AUTO: 0.3 % (ref 0–0.5)
INR PPP: 2 (ref 0.8–1.2)
LYMPHOCYTES # BLD AUTO: 0.8 K/UL (ref 1–4.8)
LYMPHOCYTES NFR BLD: 13.5 % (ref 18–48)
MCH RBC QN AUTO: 26.4 PG (ref 27–31)
MCHC RBC AUTO-ENTMCNC: 29.6 G/DL (ref 32–36)
MCV RBC AUTO: 89 FL (ref 82–98)
MONOCYTES # BLD AUTO: 0.6 K/UL (ref 0.3–1)
MONOCYTES NFR BLD: 10.4 % (ref 4–15)
NEUTROPHILS # BLD AUTO: 4.6 K/UL (ref 1.8–7.7)
NEUTROPHILS NFR BLD: 73.9 % (ref 38–73)
NRBC BLD-RTO: 0 /100 WBC
PLATELET # BLD AUTO: 275 K/UL (ref 150–450)
PMV BLD AUTO: 10.7 FL (ref 9.2–12.9)
POTASSIUM SERPL-SCNC: 4.1 MMOL/L (ref 3.5–5.1)
PROT SERPL-MCNC: 7.2 G/DL (ref 6–8.4)
PROTHROMBIN TIME: 20.9 SEC (ref 9–12.5)
RBC # BLD AUTO: 3.49 M/UL (ref 4.6–6.2)
SODIUM SERPL-SCNC: 137 MMOL/L (ref 136–145)
WBC # BLD AUTO: 6.16 K/UL (ref 3.9–12.7)

## 2023-01-03 PROCEDURE — 99999 PR PBB SHADOW E&M-EST. PATIENT-LVL IV: ICD-10-PCS | Mod: PBBFAC,GC,, | Performed by: STUDENT IN AN ORGANIZED HEALTH CARE EDUCATION/TRAINING PROGRAM

## 2023-01-03 PROCEDURE — 93793 PR ANTICOAGULANT MGMT FOR PT TAKING WARFARIN: ICD-10-PCS | Mod: S$GLB,,,

## 2023-01-03 PROCEDURE — 1160F PR REVIEW ALL MEDS BY PRESCRIBER/CLIN PHARMACIST DOCUMENTED: ICD-10-PCS | Mod: CPTII,GC,S$GLB, | Performed by: STUDENT IN AN ORGANIZED HEALTH CARE EDUCATION/TRAINING PROGRAM

## 2023-01-03 PROCEDURE — 85025 COMPLETE CBC W/AUTO DIFF WBC: CPT | Performed by: INTERNAL MEDICINE

## 2023-01-03 PROCEDURE — 85610 PROTHROMBIN TIME: CPT | Performed by: INTERNAL MEDICINE

## 2023-01-03 PROCEDURE — 3008F PR BODY MASS INDEX (BMI) DOCUMENTED: ICD-10-PCS | Mod: CPTII,GC,S$GLB, | Performed by: STUDENT IN AN ORGANIZED HEALTH CARE EDUCATION/TRAINING PROGRAM

## 2023-01-03 PROCEDURE — 99999 PR PBB SHADOW E&M-EST. PATIENT-LVL IV: CPT | Mod: PBBFAC,GC,, | Performed by: STUDENT IN AN ORGANIZED HEALTH CARE EDUCATION/TRAINING PROGRAM

## 2023-01-03 PROCEDURE — 80053 COMPREHEN METABOLIC PANEL: CPT | Performed by: INTERNAL MEDICINE

## 2023-01-03 PROCEDURE — 93793 ANTICOAG MGMT PT WARFARIN: CPT | Mod: S$GLB,,,

## 2023-01-03 PROCEDURE — 1159F MED LIST DOCD IN RCRD: CPT | Mod: CPTII,GC,S$GLB, | Performed by: STUDENT IN AN ORGANIZED HEALTH CARE EDUCATION/TRAINING PROGRAM

## 2023-01-03 PROCEDURE — 99214 OFFICE O/P EST MOD 30 MIN: CPT | Mod: GC,S$GLB,, | Performed by: STUDENT IN AN ORGANIZED HEALTH CARE EDUCATION/TRAINING PROGRAM

## 2023-01-03 PROCEDURE — 1159F PR MEDICATION LIST DOCUMENTED IN MEDICAL RECORD: ICD-10-PCS | Mod: CPTII,GC,S$GLB, | Performed by: STUDENT IN AN ORGANIZED HEALTH CARE EDUCATION/TRAINING PROGRAM

## 2023-01-03 PROCEDURE — 3008F BODY MASS INDEX DOCD: CPT | Mod: CPTII,GC,S$GLB, | Performed by: STUDENT IN AN ORGANIZED HEALTH CARE EDUCATION/TRAINING PROGRAM

## 2023-01-03 PROCEDURE — 1160F RVW MEDS BY RX/DR IN RCRD: CPT | Mod: CPTII,GC,S$GLB, | Performed by: STUDENT IN AN ORGANIZED HEALTH CARE EDUCATION/TRAINING PROGRAM

## 2023-01-03 PROCEDURE — 99214 PR OFFICE/OUTPT VISIT, EST, LEVL IV, 30-39 MIN: ICD-10-PCS | Mod: GC,S$GLB,, | Performed by: STUDENT IN AN ORGANIZED HEALTH CARE EDUCATION/TRAINING PROGRAM

## 2023-01-03 RX ORDER — LEVOTHYROXINE SODIUM 88 UG/1
88 TABLET ORAL
Qty: 30 TABLET | Refills: 11 | Status: SHIPPED | OUTPATIENT
Start: 2023-01-03 | End: 2023-06-07

## 2023-01-03 NOTE — TELEPHONE ENCOUNTER
Attempted to contact with patient/caregiver regarding equipment maintenance due at upcoming clinic appointment on 1/12/23.  Asked patient/caregiver to bring all batteries with them to next appointment for maintenance.  Voicemail left with instructions  It is medically necessary to ensure patient has properly functioning equipment and wearables to prevent infection, injury or death to patient.

## 2023-01-03 NOTE — PATIENT INSTRUCTIONS
Increase levothyroxine to 88 mcg daily    As a review, it is best to take the levothyroxine in the morning on an empty stomach, and not eat, drink or take medications for at least ~30 minutes after taking it to maximize its absorption.  Please avoid taking any multi-vitamins (anything with iron) or calcium supplements for at least 4 hours after taking the medication.  If you miss the dose on one day, take two doses the next morning to make up for the missed dose    Repeat labs in 4-6wks

## 2023-01-03 NOTE — PROGRESS NOTES
Endocrinology follow up visit  01/03/2023      Subjective:      Chief Complaint: Follow-up for hypothyroidism    HPI: Tim Richards is a 57 y.o. 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      Past Medical History:   Diagnosis Date    A-fib     Anticoagulant long-term use     Atrial flutter 7/30/2022    CHF (congestive heart failure)     Class 1 obesity due to excess calories with serious comorbidity and body mass index (BMI) of 31.0 to 31.9 in adult     Class 1 obesity due to excess calories with serious comorbidity and body mass index (BMI) of 32.0 to 32.9 in adult     Dilated cardiomyopathy 1/10/2018    Disorder of kidney and ureter     CKD    Encounter for blood transfusion     Essential hypertension 8/28/2022    Gout     HTN (hypertension)     Hx of psychiatric care     ICD (implantable cardioverter-defibrillator) infection 7/1/2020    Psychiatric problem     Thyroid disease     Ventricular tachycardia (paroxysmal)         Last seen by Dr Piedra in clinic in 3/2022. He has been seen by inpatient teams for two separate admissions in the interim.  Recently discharged from the hospital in early December 2022. AT that time levothyroxine dose was increased from 50 mcg to 75 mcg qd, with goal TSH 4-7 due to cardiac hx.    With regards to amiodarone induced hypothyroidism:    He initially developed amiodarone-induced hyperthyroidism (Type 2 AIT) in 7/2019. He required a prolonged course of PDN and MMI, then subsequently developed hypothyroidism in 5/2020. He was then hospitalized in the summer of 2022 for a prolonged period of time and TFTs were c/w hyperthyroidism again and he was placed back on steroids and MMI for AIT. Fast resolution of hyperthyroidism with quick taper of steroids and MMI. He was ultimately restarted on low dose levothyroxine at time of hospital discharge at the end of august 2022 with up-titration outpatient based on TFTs.    He generally feels well and  has no symptomatic complaints at the moment.    Currently taking:  Generic levothyroxine 75 mcg daily.    He takes it appropriately on an empty stomach and waits at least 30 minutes prior to eating.  He does take iron supplements, which he usually takes in the afternoons and at least 4 hours away from levothyroxine.    Lab Results   Component Value Date    TSH 12.222 (H) 12/20/2022    TSH 14.311 (H) 12/03/2022    TSH 10.346 (H) 08/26/2022    TSH 3.940 08/21/2022    TSH 0.127 (L) 08/14/2022    FREET4 1.10 12/20/2022    FREET4 0.85 12/03/2022    FREET4 0.87 08/26/2022    FREET4 0.93 08/21/2022    FREET4 0.98 08/19/2022     +cold intolerance  +fatigue  +constipation     Still taking amiodarone 400mg qd.      Reviewed past medical, family, social history and updated as appropriate.    ROS as above.  Objective:     Physical Exam  Vitals reviewed.   Constitutional:       General: He is not in acute distress.     Appearance: He is normal weight. He is not ill-appearing.   HENT:      Head: Normocephalic.      Mouth/Throat:      Mouth: Mucous membranes are moist.   Eyes:      Conjunctiva/sclera: Conjunctivae normal.   Cardiovascular:      Comments: LVAD   Pulmonary:      Effort: Pulmonary effort is normal. No respiratory distress.   Musculoskeletal:         General: No swelling.      Cervical back: Normal range of motion and neck supple.   Skin:     General: Skin is warm and dry.   Neurological:      Mental Status: He is alert and oriented to person, place, and time.   Psychiatric:         Mood and Affect: Mood normal.         Behavior: Behavior normal.         Wt Readings from Last 10 Encounters:   12/05/22 1201 91.6 kg (202 lb)   12/03/22 0731 93.8 kg (206 lb 12.7 oz)   12/02/22 1125 93.4 kg (205 lb 14.6 oz)   12/01/22 1006 93.4 kg (206 lb)   11/23/22 0932 97.1 kg (214 lb)   11/23/22 1241 98 kg (216 lb 0.8 oz)   11/09/22 1108 97.3 kg (214 lb 6.4 oz)   11/03/22 1542 94.3 kg (208 lb)   11/03/22 1400 94.8 kg (209 lb)    11/03/22 1419 94.3 kg (208 lb)   10/04/22 0750 89.4 kg (197 lb 1.5 oz)   10/03/22 0500 89.9 kg (198 lb 3.1 oz)   10/02/22 0455 89.2 kg (196 lb 10.4 oz)   10/01/22 0524 85.8 kg (189 lb 2.5 oz)   09/30/22 0528 86 kg (189 lb 9.5 oz)   09/29/22 0529 82.2 kg (181 lb 3.5 oz)   09/28/22 0301 90 kg (198 lb 6.6 oz)   09/27/22 0400 90.5 kg (199 lb 8.3 oz)   09/25/22 0105 90.2 kg (198 lb 13.7 oz)   09/24/22 2136 90.2 kg (198 lb 13.7 oz)   09/24/22 1758 86.2 kg (190 lb)       Lab Results   Component Value Date    HGBA1C 5.4 04/26/2021    HGBA1C 5.5 03/08/2018    HGBA1C 5.4 01/23/2018    HGBA1C 5.6 08/04/2016     Lab Results   Component Value Date    CHOL 130 05/19/2022    CHOL 170 11/11/2021    HDL 46 05/19/2022    HDL 55 11/11/2021    LDLCALC 54.8 (L) 05/19/2022    LDLCALC 93.2 11/11/2021    TRIG 68 08/31/2022    TRIG 79 08/24/2022    CHOLHDL 35.4 05/19/2022    CHOLHDL 32.4 11/11/2021     Lab Results   Component Value Date     12/27/2022    K 3.7 12/27/2022     12/27/2022    CO2 27 12/27/2022    GLU 61 (L) 12/27/2022    BUN 16 12/27/2022    CREATININE 1.6 (H) 12/27/2022    CALCIUM 8.8 12/27/2022    PROT 7.2 12/27/2022    ALBUMIN 2.9 (L) 12/27/2022    BILITOT 0.3 12/27/2022    ALKPHOS 83 12/27/2022    AST 31 12/27/2022    ALT 17 12/27/2022    ANIONGAP 10 12/27/2022    ESTGFRAFRICA 37.6 (A) 07/31/2022    EGFRNONAA 32.5 (A) 07/31/2022    TSH 12.222 (H) 12/20/2022      No results found for: MICALBCREAT    Assessment/Plan:     LVAD (left ventricular assist device) present  LVAD in place, follows closely with HTS  Due to cardiac hx, goal TSH 4-7    Amiodarone-induced hyperthyroidism  He has had transient amiodarone-induced hyperthyroidism multiple times in the past, most recently in 8/2022 however - resolved and MMI and steroids weaned off each time with subsequent hypothyroidism necessitating levothyroxine     amiodarone induced hypothyrodism  Amiodarone-induced hypothyroidism   On levothyroxine 75 mcg qd with TSH  above goal of 4-7  Taking medication correctly   Some symptoms that may be due to hypothyroidsm  Will increase to 88 mcg qd and repeat labs in 4-6 wks - conservative dose increase due to cardiac hx and higher TSH goal      RTC 6mo.  Labs 6-8wks       María Brice MD  [facility], [address]  [address] [place], [place], [address]    Office: [phone]  Fax: [phone]

## 2023-01-03 NOTE — ASSESSMENT & PLAN NOTE
He has had transient amiodarone-induced hyperthyroidism multiple times in the past, most recently in 8/2022 however - resolved and MMI and steroids weaned off each time with subsequent hypothyroidism necessitating levothyroxine

## 2023-01-03 NOTE — ASSESSMENT & PLAN NOTE
Amiodarone-induced hypothyroidism   On levothyroxine 75 mcg qd with TSH above goal of 4-7  Taking medication correctly   Some symptoms that may be due to hypothyroidsm  Will increase to 88 mcg qd and repeat labs in 4-6 wks - conservative dose increase due to cardiac hx and higher TSH goal

## 2023-01-04 LAB
FUNGUS SPEC CULT: NORMAL
FUNGUS SPEC CULT: NORMAL

## 2023-01-05 NOTE — PROGRESS NOTES
Labs reviewed, CR elevated, attempted to contact patient, no answer and VM left. Patient coming to clinic next week.

## 2023-01-06 ENCOUNTER — OFFICE VISIT (OUTPATIENT)
Dept: INFECTIOUS DISEASES | Facility: CLINIC | Age: 58
End: 2023-01-06
Payer: MEDICARE

## 2023-01-06 ENCOUNTER — INFUSION (OUTPATIENT)
Dept: INFECTIOUS DISEASES | Facility: HOSPITAL | Age: 58
End: 2023-01-06
Attending: INTERNAL MEDICINE
Payer: MEDICARE

## 2023-01-06 ENCOUNTER — DOCUMENTATION ONLY (OUTPATIENT)
Dept: TRANSPLANT | Facility: CLINIC | Age: 58
End: 2023-01-06
Payer: MEDICARE

## 2023-01-06 ENCOUNTER — HOSPITAL ENCOUNTER (OUTPATIENT)
Dept: RADIOLOGY | Facility: HOSPITAL | Age: 58
Discharge: HOME | End: 2023-01-06
Attending: THORACIC SURGERY (CARDIOTHORACIC VASCULAR SURGERY)
Payer: MEDICARE

## 2023-01-06 ENCOUNTER — TELEPHONE (OUTPATIENT)
Dept: CARDIOTHORACIC SURGERY | Facility: CLINIC | Age: 58
End: 2023-01-06
Payer: MEDICARE

## 2023-01-06 ENCOUNTER — HOSPITAL ENCOUNTER (OUTPATIENT)
Dept: PULMONOLOGY | Facility: CLINIC | Age: 58
Discharge: HOME | End: 2023-01-06
Payer: MEDICARE

## 2023-01-06 VITALS — BODY MASS INDEX: 29.27 KG/M2 | HEIGHT: 73 IN | WEIGHT: 220.88 LBS | TEMPERATURE: 99 F

## 2023-01-06 DIAGNOSIS — Z16.12 ESBL (EXTENDED SPECTRUM BETA-LACTAMASE) PRODUCING BACTERIA INFECTION: Primary | ICD-10-CM

## 2023-01-06 DIAGNOSIS — Z95.811 HEART REPLACED BY HEART ASSIST DEVICE: ICD-10-CM

## 2023-01-06 DIAGNOSIS — A49.9 ESBL (EXTENDED SPECTRUM BETA-LACTAMASE) PRODUCING BACTERIA INFECTION: Primary | ICD-10-CM

## 2023-01-06 DIAGNOSIS — I72.4 PSEUDOANEURYSM OF RIGHT FEMORAL ARTERY: ICD-10-CM

## 2023-01-06 DIAGNOSIS — Z95.811 LVAD (LEFT VENTRICULAR ASSIST DEVICE) PRESENT: Primary | ICD-10-CM

## 2023-01-06 DIAGNOSIS — R19.00 ABDOMINAL SWELLING: ICD-10-CM

## 2023-01-06 DIAGNOSIS — B44.89: ICD-10-CM

## 2023-01-06 DIAGNOSIS — I50.9 HEART FAILURE: ICD-10-CM

## 2023-01-06 DIAGNOSIS — Z95.811 LVAD (LEFT VENTRICULAR ASSIST DEVICE) PRESENT: ICD-10-CM

## 2023-01-06 LAB
DLCO ADJ PRE: 12.44 ML/(MIN*MMHG) (ref 25.54–39.4)
DLCO SINGLE BREATH LLN: 25.54
DLCO SINGLE BREATH PRE REF: 30.8 %
DLCO SINGLE BREATH REF: 32.47
DLCOC SBVA LLN: 3.1
DLCOC SBVA PRE REF: 84.4 %
DLCOC SBVA REF: 4.2
DLCOC SINGLE BREATH LLN: 25.54
DLCOC SINGLE BREATH PRE REF: 38.3 %
DLCOC SINGLE BREATH REF: 32.47
DLCOCSBVAULN: 5.29
DLCOCSINGLEBREATHULN: 39.4
DLCOSINGLEBREATHULN: 39.4
DLCOVA LLN: 3.1
DLCOVA PRE REF: 68 %
DLCOVA PRE: 2.85 ML/(MIN*MMHG*L) (ref 3.1–5.29)
DLCOVA REF: 4.2
DLCOVAULN: 5.29
DLVAADJ PRE: 3.54 ML/(MIN*MMHG*L) (ref 3.1–5.29)
FEF 25 75 LLN: 1.34
FEF 25 75 PRE REF: 42.8 %
FEF 25 75 REF: 3.02
FEV05 LLN: 2.03
FEV05 REF: 3.16
FEV1 FVC LLN: 67
FEV1 FVC PRE REF: 94.2 %
FEV1 FVC REF: 78
FEV1 LLN: 2.58
FEV1 PRE REF: 52.1 %
FEV1 REF: 3.49
FVC LLN: 3.37
FVC PRE REF: 55.1 %
FVC REF: 4.48
IVC PRE: 2.32 L (ref 3.37–5.6)
IVC SINGLE BREATH LLN: 3.37
IVC SINGLE BREATH PRE REF: 51.8 %
IVC SINGLE BREATH REF: 4.48
IVCSINGLEBREATHULN: 5.6
PEF LLN: 6.45
PEF PRE REF: 56.5 %
PEF REF: 9.34
PHYSICIAN COMMENT: ABNORMAL
PRE DLCO: 10.02 ML/(MIN*MMHG) (ref 25.54–39.4)
PRE FEF 25 75: 1.29 L/S (ref 1.34–5.37)
PRE FET 100: 6.81 SEC
PRE FEV05 REF: 45.9 %
PRE FEV1 FVC: 73.71 % (ref 67.14–87.83)
PRE FEV1: 1.82 L (ref 2.58–4.36)
PRE FEV5: 1.45 L (ref 2.03–4.3)
PRE FVC: 2.47 L (ref 3.37–5.6)
PRE PEF: 5.28 L/S (ref 6.45–12.22)
VA PRE: 3.51 L (ref 7.59–7.59)
VA SINGLE BREATH LLN: 7.59
VA SINGLE BREATH PRE REF: 46.3 %
VA SINGLE BREATH REF: 7.59
VASINGLEBREATHULN: 7.59

## 2023-01-06 PROCEDURE — 94729 PR C02/MEMBANE DIFFUSE CAPACITY: ICD-10-PCS | Mod: S$GLB,,, | Performed by: INTERNAL MEDICINE

## 2023-01-06 PROCEDURE — 99999 PR PBB SHADOW E&M-EST. PATIENT-LVL III: ICD-10-PCS | Mod: PBBFAC,,, | Performed by: STUDENT IN AN ORGANIZED HEALTH CARE EDUCATION/TRAINING PROGRAM

## 2023-01-06 PROCEDURE — 94729 DIFFUSING CAPACITY: CPT | Mod: S$GLB,,, | Performed by: INTERNAL MEDICINE

## 2023-01-06 PROCEDURE — 99999 PR PBB SHADOW E&M-EST. PATIENT-LVL III: CPT | Mod: PBBFAC,,, | Performed by: STUDENT IN AN ORGANIZED HEALTH CARE EDUCATION/TRAINING PROGRAM

## 2023-01-06 PROCEDURE — 3008F PR BODY MASS INDEX (BMI) DOCUMENTED: ICD-10-PCS | Mod: CPTII,S$GLB,, | Performed by: STUDENT IN AN ORGANIZED HEALTH CARE EDUCATION/TRAINING PROGRAM

## 2023-01-06 PROCEDURE — 99215 OFFICE O/P EST HI 40 MIN: CPT | Mod: S$GLB,,, | Performed by: STUDENT IN AN ORGANIZED HEALTH CARE EDUCATION/TRAINING PROGRAM

## 2023-01-06 PROCEDURE — 3008F BODY MASS INDEX DOCD: CPT | Mod: CPTII,S$GLB,, | Performed by: STUDENT IN AN ORGANIZED HEALTH CARE EDUCATION/TRAINING PROGRAM

## 2023-01-06 PROCEDURE — 1160F RVW MEDS BY RX/DR IN RCRD: CPT | Mod: CPTII,S$GLB,, | Performed by: STUDENT IN AN ORGANIZED HEALTH CARE EDUCATION/TRAINING PROGRAM

## 2023-01-06 PROCEDURE — 99215 PR OFFICE/OUTPT VISIT, EST, LEVL V, 40-54 MIN: ICD-10-PCS | Mod: S$GLB,,, | Performed by: STUDENT IN AN ORGANIZED HEALTH CARE EDUCATION/TRAINING PROGRAM

## 2023-01-06 PROCEDURE — 94010 BREATHING CAPACITY TEST: ICD-10-PCS | Mod: 59,S$GLB,, | Performed by: INTERNAL MEDICINE

## 2023-01-06 PROCEDURE — 1159F MED LIST DOCD IN RCRD: CPT | Mod: CPTII,S$GLB,, | Performed by: STUDENT IN AN ORGANIZED HEALTH CARE EDUCATION/TRAINING PROGRAM

## 2023-01-06 PROCEDURE — 1159F PR MEDICATION LIST DOCUMENTED IN MEDICAL RECORD: ICD-10-PCS | Mod: CPTII,S$GLB,, | Performed by: STUDENT IN AN ORGANIZED HEALTH CARE EDUCATION/TRAINING PROGRAM

## 2023-01-06 PROCEDURE — 94010 BREATHING CAPACITY TEST: CPT | Mod: 59,S$GLB,, | Performed by: INTERNAL MEDICINE

## 2023-01-06 PROCEDURE — 1160F PR REVIEW ALL MEDS BY PRESCRIBER/CLIN PHARMACIST DOCUMENTED: ICD-10-PCS | Mod: CPTII,S$GLB,, | Performed by: STUDENT IN AN ORGANIZED HEALTH CARE EDUCATION/TRAINING PROGRAM

## 2023-01-06 RX ORDER — VORICONAZOLE 200 MG/1
200 TABLET, FILM COATED ORAL 2 TIMES DAILY
Qty: 60 TABLET | Refills: 2 | Status: SHIPPED | OUTPATIENT
Start: 2023-01-06 | End: 2023-07-11 | Stop reason: ALTCHOICE

## 2023-01-06 NOTE — TELEPHONE ENCOUNTER
"Received a call from LVAD Coordinator, Jack, who informed me that her office received a call that pt is currently in the Infusion Dept, and c/o a protrusion near where his chest tubes were when his LVAD was exchanged in July 2022.  Called and spoke with pt's wife who informed me that pt began noticing this protrusion about 2 weeks ago and thinks that the site is increasing each day.  Pt's wife described the site currently as "the size of a C cup bra".  Informed Dr. Kaufman of above who ordered a CT C/A/P, non-contrast, to be performed as soon as possible and stated that his office will call the pt with the results.  I informed pt's wife of the CT scan and that it can be performed this afternoon at the [facility], across the street from [facility], which she verbalized understanding to.  Since pt's wife stated that she is not currently with pt, I called and notified pt of CT C/A/P, non-contrast, scheduled for this afternoon at the [facility], across the street from [facility], which pt verbalized understanding to.  Pt informed that once the results are reviewed by Dr. Kaufman, pt should expect a call from Dr. Kaufman's office to discuss, which he verbalized understanding to.    "

## 2023-01-06 NOTE — TELEPHONE ENCOUNTER
Upon noticing that pt's CT scan scheduled for this afternoon had been rescheduled to 1/12, I called pt to inquire about the appt change.  Pt stated that his insurance company is reviewing the CT scan for authorization and notified him that a decision will not be made today, so pt rescheduled the appt to next week.  Pt denied pain to the site of the protrusion located near his prior chest tube sites at this time.  Instructed pt to report to the ED if he feels like he needs immediate medical attention in the meantime, which he verbalized understanding to.  Dr. Kaufman and Jack Guerrier, RN LVAD Coordinator, notified of above, which they verbalized understanding to.

## 2023-01-06 NOTE — PROGRESS NOTES
INFECTIOUS DISEASE CLINIC  01/06/2023     Subjective:      Chief Complaint:   Chief Complaint   Patient presents with    Follow-up         History of Present Illness:    This is a 57 y.o. 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  Patient known to ID, please see prior notes for full details. Since discharge from the hospital pt states he has been doing ok. Missed/canceled a few ID follow up appts due to transportation issues. Saw vasc surgery recently with wound vac removed. Pt reports tolerating abx without issues and denies problems with PICC. Denies fevers or chills, though states he is seeing some drainage from his prior R groin surgical site.       Interval history:   11/23/22: Since last seen, he states he has been doing ok - noticed more swelling of his R groin. No drainage or pain noted. Tolerating erta without issues. Saw plastics today - attempted to drain fluid collection per patient, unable to aspirate. Reports taking vori, denies adverse reactions.   1/6/23: Doing well since last seen in clinic - briefly admitted in the fall; cx data obtained at that time unrevealing. US of R groin with cystic structure (lymphocele vs seroma) without communication with prior graft. Pt reports tolerating abx/vori without issues. Reports R groin swelling is better, less swollen and with minimal dc. Denies problems with PICC. Reports abdominal swelling when supine; nonpainful. Pt received a letter from insurance company that vori may not be covered.       Review of Systems   Constitutional: Negative for chills and fever.   Skin:  Positive for poor wound healing.   All other systems reviewed and  are negative.      Past Medical History:   Diagnosis Date    A-fib     Anticoagulant long-term use     Atrial flutter 7/30/2022    CHF (congestive heart failure)     Class 1 obesity due to excess calories with serious comorbidity and body mass index (BMI) of 31.0 to 31.9 in adult     Class 1 obesity due to excess calories with serious comorbidity and body mass index (BMI) of 32.0 to 32.9 in adult     Dilated cardiomyopathy 1/10/2018    Disorder of kidney and ureter     CKD    Encounter for blood transfusion     Essential hypertension 8/28/2022    Gout     HTN (hypertension)     Hx of psychiatric care     ICD (implantable cardioverter-defibrillator) infection 7/1/2020    Psychiatric problem     Thyroid disease     Ventricular tachycardia (paroxysmal)      Past Surgical History:   Procedure Laterality Date    AORTIC VALVULOPLASTY N/A 7/13/2022    Procedure: REPAIR, AORTIC VALVE;  Surgeon: Yg Kaufman MD;  Location: [facility] OR [address] [place] [address];  Service: Cardiovascular;  Laterality: N/A;    APPLICATION OF WOUND VACUUM-ASSISTED CLOSURE DEVICE N/A 7/15/2022    Procedure: APPLICATION, WOUND VAC;  Surgeon: Yg Kaufman MD;  Location: [facility] OR [address] [place] [address];  Service: Cardiovascular;  Laterality: N/A;  50 x 5 cm     APPLICATION OF WOUND VACUUM-ASSISTED CLOSURE DEVICE Right 9/27/2022    Procedure: APPLICATION, WOUND VAC;  Surgeon: Kole Tabares MD;  Location: [facility] OR [address] [place] [address];  Service: Plastics;  Laterality: Right;    CARDIAC CATHETERIZATION  Dec. 2012    CARDIAC DEFIBRILLATOR PLACEMENT Left     CRRT-D    COLONOSCOPY N/A 3/6/2018    Procedure: COLONOSCOPY;  Surgeon: Alonso Bone MD;  Location: [facility] ([address] [place] [address]);  Service: Endoscopy;  Laterality: N/A;    COLONOSCOPY N/A 7/17/2019    Procedure: COLONOSCOPY;  Surgeon: Blane Valdez MD;  Location: [facility] ENDO ([address] [place] [address]);  Service: Endoscopy;  Laterality: N/A;    COLONOSCOPY N/A 7/18/2019    Procedure: COLONOSCOPY;  Surgeon: Blane Valdez MD;  Location: [facility] ENDO ([address] [place] [address]);  Service:  Endoscopy;  Laterality: N/A;    CREATION OF ILIOFEMORAL ARTERY BYPASS Right 9/27/2022    Procedure: CREATION, BYPASS, ARTERIAL, ILIAC TO FEMORAL WITH GRAFT;  Surgeon: Zach Hernández MD;  Location: [facility] OR [facility] FLR;  Service: Peripheral Vascular;  Laterality: Right;    CREATION OF MUSCLE ROTATIONAL FLAP Right 9/27/2022    Procedure: CREATION, FLAP, MUSCLE ROTATION, SARTORIUS AND RECTUS FEMORIS;  Surgeon: Kole Tabares MD;  Location: [facility] OR [facility] FLR;  Service: Plastics;  Laterality: Right;    ESOPHAGOGASTRODUODENOSCOPY N/A 7/17/2019    Procedure: EGD (ESOPHAGOGASTRODUODENOSCOPY);  Surgeon: Blane Valdez MD;  Location: [facility] ENDO (2ND FLR);  Service: Endoscopy;  Laterality: N/A;    ESOPHAGOGASTRODUODENOSCOPY N/A 7/18/2019    Procedure: EGD (ESOPHAGOGASTRODUODENOSCOPY);  Surgeon: Blane Valdez MD;  Location: [facility] ENDO (2ND FLR);  Service: Endoscopy;  Laterality: N/A;    FOREIGN BODY REMOVAL N/A 7/22/2022    Procedure: REMOVAL, FOREIGN BODY;  Surgeon: Yg Kaufman MD;  Location: [facility] OR [facility] FLR;  Service: Cardiovascular;  Laterality: N/A;  LVAD Heartmate 2 drive line removal    INSERTION OF GRAFT TO PERICARDIUM N/A 7/15/2022    Procedure: INSERTION, GRAFT, PERICARDIUM;  Surgeon: Yg Kaufman MD;  Location: [facility] OR [facility] FLR;  Service: Cardiovascular;  Laterality: N/A;    IRRIGATION OF MEDIASTINUM N/A 7/15/2022    Procedure: IRRIGATION, MEDIASTINUM;  Surgeon: Yg Kaufman MD;  Location: [facility] OR [facility] FLR;  Service: Cardiovascular;  Laterality: N/A;    LYSIS OF ADHESIONS  7/13/2022    Procedure: LYSIS, ADHESIONS;  Surgeon: Yg Kaufman MD;  Location: [facility] OR [facility] FLR;  Service: Cardiovascular;;    NONINVASIVE CARDIAC ELECTROPHYSIOLOGY STUDY N/A 10/18/2019    Procedure: CARDIAC ELECTROPHYSIOLOGY STUDY, NONINVASIVE;  Surgeon: Raz Wagner MD;  Location: [facility] EP LAB;  Service: Cardiology;  Laterality: N/A;  VT, DFTs, MDT CRTD in situ, LVAD, anes, MB, 3098    REPLACEMENT OF IMPLANTABLE  CARDIOVERTER-DEFIBRILLATOR (ICD) GENERATOR N/A 3/9/2020    Procedure: REPLACEMENT, ICD GENERATOR;  Surgeon: Harry Yun MD;  Location: [facility] EP LAB;  Service: Cardiology;  Laterality: N/A;  VT, ICD Gen Change and Lead Revision, MDT, MAC, DM,3 Prep    REPLACEMENT OF LEFT VENTRICULAR ASSIST DEVICE (LVAD)  7/13/2022    Procedure: REPLACEMENT, LVAD;  Surgeon: Yg Kaufman MD;  [address];  Service: Cardiovascular;;    REPLACEMENT OF PUMP N/A 7/13/2022    Procedure: REPLACEMENT, PUMP;  Surgeon: Yg Kaufman MD;  [facility];  Service: Cardiovascular;  Laterality: N/A;  LVAD pump exchange  EXPLANATION OF HEATMATE 2  IMPLANTATION OF HEARTMATE 3  IMPLANTATION OF 8MM CHIMNEY GRAFT TO RFA  INITIATION OF ECMO  TEMPORARY CLOSURE OF CHEST    REVISION OF IMPLANTABLE CARDIOVERTER-DEFIBRILLATOR (ICD) ELECTRODE LEAD PLACEMENT N/A 3/9/2020    Procedure: REVISION, INSERTION, ELECTRODE LEAD, ICD;  Surgeon: Harry Yun MD;  Location: [facility] EP LAB;  Service: Cardiology;  Laterality: N/A;  VT, ICD Gen Change and Lead Revision, MDT, MAC, DM,3 Prep    STERNAL WOUND CLOSURE N/A 7/14/2022    Procedure: CLOSURE, WOUND, STERNUM;  Surgeon: Yg Kaufman MD;  [facility];  Service: Cardiovascular;  Laterality: N/A;  temporary closure  evacuation of hematoma    STERNAL WOUND CLOSURE N/A 7/15/2022    Procedure: CLOSURE, WOUND, STERNUM;  Surgeon: Yg Kaufman MD;  Location: [facility] OR [address] [place] [address];  Service: Cardiovascular;  Laterality: N/A;    TRACHEOSTOMY N/A 8/4/2022    Procedure: CREATION, TRACHEOSTOMY;  Surgeon: Germain Holt MD;  Location: [facility] OR [address] [place] [address];  Service: General;  Laterality: N/A;    TREATMENT OF CARDIAC ARRHYTHMIA  10/18/2019    Procedure: Cardioversion or Defibrillation;  Surgeon: Raz Wagner MD;  Location: [facility] EP LAB;  Service: Cardiology;;     Family History   Problem Relation Age of Onset    Hypertension Father     Diabetes Father     Coronary artery disease  Father     Heart disease Father         CHF    No Known Problems Mother     Cancer Sister 54        breast CA    No Known Problems Brother     Anxiety disorder Neg Hx     Depression Neg Hx     Dementia Neg Hx     Bipolar disorder Neg Hx     Suicide Neg Hx      Social History     Tobacco Use    Smoking status: Former     Packs/day: 1.00     Years: 31.00     Pack years: 31.00     Types: Cigarettes     Quit date: 2018     Years since quittin.9    Smokeless tobacco: Former    Tobacco comments:     pt is quiting on his own - pt stated not qualified for program;  pt  quit on his own   Substance Use Topics    Alcohol use: No     Alcohol/week: 0.0 standard drinks     Comment: quit    Drug use: No       Review of patient's allergies indicates:   Allergen Reactions    Lisinopril Anaphylaxis    Hydralazine     Hydralazine analogues      Chronic constipation, impotence, dizziness         Objective:   VS (24h):   Vitals:    23 1108   Temp: 99.2 °F (37.3 °C)           Physical Exam  Constitutional:       General: He is not in acute distress.     Appearance: He is not ill-appearing or toxic-appearing.   HENT:      Head: Normocephalic and atraumatic.      Right Ear: External ear normal.      Left Ear: External ear normal.   Eyes:      General: No scleral icterus.        Right eye: No discharge.         Left eye: No discharge.   Neck:      Comments: Prior trach site well healed  Cardiovascular:      Comments: defib  Pulmonary:      Effort: Pulmonary effort is normal. No respiratory distress.   Abdominal:      General: There is no distension.      Palpations: Abdomen is soft.      Tenderness: There is no abdominal tenderness. There is no guarding.      Comments: LVAD site dressed  epigastric hernia (reproducible, non-tender)    Musculoskeletal:         General: Swelling (R groin (improved)) present. No tenderness.      Right lower leg: No edema.      Left lower leg: No edema.   Skin:     General: Skin is warm and  dry.      Comments: R groin wound - granulation tissue present; minimal drainage  R PICC   Neurological:      Mental Status: He is alert and oriented to person, place, and time. Mental status is at baseline.      Motor: No weakness.   Psychiatric:         Mood and Affect: Mood normal.         Behavior: Behavior normal.         Immunization History   Administered Date(s) Administered    COVID-19, MRNA, LN-S, PF (MODERNA FULL 0.5 ML DOSE) 03/12/2021, 04/09/2021    COVID-19, MRNA, LN-S, PF (Pfizer) (Purple Cap) 12/04/2021    Hepatitis A / Hepatitis B 02/23/2018    Influenza 11/01/2014, 08/17/2019    Influenza - Quadrivalent - PF *Preferred* (6 months and older) 09/23/2015, 09/21/2016, 10/05/2017, 03/05/2021    Influenza - Trivalent - PF (PED) 11/01/2014    Pneumococcal Conjugate - 13 Valent 11/01/2014    Pneumococcal Polysaccharide - 23 Valent 10/01/2015, 03/24/2016    Tdap 02/23/2018         Assessment:     1. Pseudoaneurysm of right femoral artery  - voriconazole (VFEND) 200 MG Tab; Take 1 tablet (200 mg total) by mouth 2 (two) times daily.  Dispense: 60 tablet; Refill: 2  - Nursing communication    2. 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  Dispense: 60 tablet; Refill: 2  - Nursing communication    3. ESBL (extended spectrum beta-lactamase) producing bacteria infection  - Nursing communication         56 yo male with complex medical history including HM3 2018 with prior prolonged hospital stay that was notable for hypoxic respiratory failure 2/2 to COVID, cardiogenic shock with AV repair with redo sternotomy, explant of prior LVAD, VA ECMO (decannulated 7/19/22) with take back for mediastinal washout/hematoma, kleb aerogenes vap s/p treatment with recent admission for drainage/bleeding around prior R ecmo cannula site, found to have infected pseudoaneurysm/mycotic aneurysm (superficial wound cx with ESBL Ecoli) s/p R groin  exploration with explant of infected graft, creation of ileofem bypass with rif soaked dacron graft/muscle flap (OR cx with ESBL Ecoli, Citrobacter farmeri, and PM) with growth of aspergillus flavus post-discharge from his surgical cultures - started on voriconazole on 11/9 after clinic visit. Recently admitted 12/2022 for R groin swelling, found to have lymphocele/seroma that did not communicate with prior graft. He has had prior Cacnes from his prior LVAD site (suspect skin colonizer/contaminant). His cultures from his most recent hospital stay have been unrevealing. Tolerating vori and erta without issues. Patient has been on IV ertapenem for almost 15w - tolerated without issues. No oral options available for prior bacterial infection. Discussed risks/benefits of continued ertapenem suppressive therapy; shared decision making with patient - will stop IV erta and pull picc, pt amenable to continuing PO voriconazole. Will monitor patient closely. Discussed with pt that if he experiences any symptoms c/w infection, to inform me and his care team.     Plan:     -stop IV erta and pull picc;infusion company notified  -continue PO voriconazole - prescription sent to [facility] to assist with prior authorization  -discussed with pt that he will need to be on vori for an extended period of time, minimum 6 mo to potentially life long as long as pt is able to tolerate it  -monitor DDI with warfarin and potential adverse reactions  -abdominal swelling c/f epigastric hernia - reproducible on exam and without evidence of strangulation; pt stated he will notify his surgical team.      These recommendations have been sent to and/or discussed with the following providers:   -HTS    Follow up in 1 mo or sooner, WB preferable per pt    Management of infected pseudoaneurysm was discussed with patient. Patient was given ample time for questions, all questions answered. Strict return precautions given to patient.       40 minutes of total  time spent on the encounter, which includes face to face time and non-face to face time preparing to see the patient (eg, review of tests), Obtaining and/or reviewing separately obtained history, documenting clinical information in the electronic or other health record, independently interpreting results (not separately reported) and communicating results to the patient/family/caregiver, or care coordination (not separately reported).           Jessica Perez MD  Infectious Disease

## 2023-01-06 NOTE — PROGRESS NOTES
PICC line removed per MD order, measured tip @ 42cm, CDI, pt observed for 30 min, pt tolerated well, vaseline gauze and 2x2 gauze applied, wrapped with coflex, pt instructed to keep dressing on & dry for 24 hrs then remove, monitor for s/s of infection & notify MD, pt verbalized understanding of all above & left in NAD

## 2023-01-06 NOTE — PROGRESS NOTES
FREDERICK Caballero received phone call from Kelly, patient's wife who reported that the patient has a large fluid like collection on hist chest abdomen area where his Chest tubes were. Contacted Kelly with CTS who stated that they would order CT scans and assess what it actually is. Will follow up with patient next week at his clinic visit and once CTS has results.

## 2023-01-07 ENCOUNTER — DOCUMENT SCAN (OUTPATIENT)
Dept: HOME HEALTH SERVICES | Facility: HOSPITAL | Age: 58
End: 2023-01-07
Payer: MEDICARE

## 2023-01-09 ENCOUNTER — LAB VISIT (OUTPATIENT)
Dept: LAB | Facility: HOSPITAL | Age: 58
End: 2023-01-09
Attending: INTERNAL MEDICINE
Payer: MEDICARE

## 2023-01-09 ENCOUNTER — TELEPHONE (OUTPATIENT)
Dept: ELECTROPHYSIOLOGY | Facility: CLINIC | Age: 58
End: 2023-01-09
Payer: MEDICARE

## 2023-01-09 ENCOUNTER — ANTI-COAG VISIT (OUTPATIENT)
Dept: CARDIOLOGY | Facility: CLINIC | Age: 58
End: 2023-01-09
Payer: MEDICARE

## 2023-01-09 DIAGNOSIS — Z79.01 LONG TERM (CURRENT) USE OF ANTICOAGULANTS: Primary | ICD-10-CM

## 2023-01-09 DIAGNOSIS — I48.92 ATRIAL FLUTTER: ICD-10-CM

## 2023-01-09 DIAGNOSIS — N18.32 CHRONIC KIDNEY DISEASE (CKD) STAGE G3B/A1, MODERATELY DECREASED GLOMERULAR FILTRATION RATE (GFR) BETWEEN 30-44 ML/MIN/1.73 SQUARE METER AND ALBUMINURIA CREATININE RATIO LESS THAN 30 MG/G: ICD-10-CM

## 2023-01-09 DIAGNOSIS — I48.91 ATRIAL FIBRILLATION: ICD-10-CM

## 2023-01-09 LAB
INR PPP: 2.3 (ref 0.8–1.2)
PROTHROMBIN TIME: 23.5 SEC (ref 9–12.5)

## 2023-01-09 PROCEDURE — 85610 PROTHROMBIN TIME: CPT | Performed by: INTERNAL MEDICINE

## 2023-01-09 PROCEDURE — 93793 PR ANTICOAGULANT MGMT FOR PT TAKING WARFARIN: ICD-10-PCS | Mod: S$GLB,,,

## 2023-01-09 PROCEDURE — 93793 ANTICOAG MGMT PT WARFARIN: CPT | Mod: S$GLB,,,

## 2023-01-10 ENCOUNTER — TELEPHONE (OUTPATIENT)
Dept: PHARMACY | Facility: CLINIC | Age: 58
End: 2023-01-10
Payer: MEDICARE

## 2023-01-11 ENCOUNTER — TELEPHONE (OUTPATIENT)
Dept: ELECTROPHYSIOLOGY | Facility: CLINIC | Age: 58
End: 2023-01-11
Payer: MEDICARE

## 2023-01-11 ENCOUNTER — DOCUMENT SCAN (OUTPATIENT)
Dept: HOME HEALTH SERVICES | Facility: HOSPITAL | Age: 58
End: 2023-01-11
Payer: MEDICARE

## 2023-01-12 ENCOUNTER — DOCUMENT SCAN (OUTPATIENT)
Dept: HOME HEALTH SERVICES | Facility: HOSPITAL | Age: 58
End: 2023-01-12
Payer: MEDICARE

## 2023-01-12 ENCOUNTER — ANTI-COAG VISIT (OUTPATIENT)
Dept: CARDIOLOGY | Facility: CLINIC | Age: 58
End: 2023-01-12
Payer: MEDICARE

## 2023-01-12 ENCOUNTER — LAB VISIT (OUTPATIENT)
Dept: LAB | Facility: HOSPITAL | Age: 58
End: 2023-01-12
Attending: INTERNAL MEDICINE
Payer: MEDICARE

## 2023-01-12 ENCOUNTER — OFFICE VISIT (OUTPATIENT)
Dept: TRANSPLANT | Facility: CLINIC | Age: 58
End: 2023-01-12
Payer: MEDICARE

## 2023-01-12 ENCOUNTER — CLINICAL SUPPORT (OUTPATIENT)
Dept: TRANSPLANT | Facility: CLINIC | Age: 58
End: 2023-01-12
Payer: MEDICARE

## 2023-01-12 VITALS — SYSTOLIC BLOOD PRESSURE: 90 MMHG | WEIGHT: 217 LBS | HEIGHT: 73 IN | TEMPERATURE: 97 F | BODY MASS INDEX: 28.76 KG/M2

## 2023-01-12 DIAGNOSIS — Z95.811 HEART REPLACED BY HEART ASSIST DEVICE: ICD-10-CM

## 2023-01-12 DIAGNOSIS — Z95.811 LVAD (LEFT VENTRICULAR ASSIST DEVICE) PRESENT: Chronic | ICD-10-CM

## 2023-01-12 DIAGNOSIS — I50.42 CHRONIC COMBINED SYSTOLIC AND DIASTOLIC CONGESTIVE HEART FAILURE: Primary | ICD-10-CM

## 2023-01-12 DIAGNOSIS — I42.0 DILATED CARDIOMYOPATHY: ICD-10-CM

## 2023-01-12 DIAGNOSIS — I72.4 PSEUDOANEURYSM OF RIGHT FEMORAL ARTERY: ICD-10-CM

## 2023-01-12 LAB
ALBUMIN SERPL BCP-MCNC: 3.2 G/DL (ref 3.5–5.2)
ALP SERPL-CCNC: 95 U/L (ref 55–135)
ALT SERPL W/O P-5'-P-CCNC: 26 U/L (ref 10–44)
ANION GAP SERPL CALC-SCNC: 6 MMOL/L (ref 8–16)
AST SERPL-CCNC: 35 U/L (ref 10–40)
BASOPHILS # BLD AUTO: 0.01 K/UL (ref 0–0.2)
BASOPHILS NFR BLD: 0.2 % (ref 0–1.9)
BILIRUB DIRECT SERPL-MCNC: 0.2 MG/DL (ref 0.1–0.3)
BILIRUB SERPL-MCNC: 0.4 MG/DL (ref 0.1–1)
BNP SERPL-MCNC: 1680 PG/ML (ref 0–99)
BUN SERPL-MCNC: 16 MG/DL (ref 6–20)
CALCIUM SERPL-MCNC: 9.3 MG/DL (ref 8.7–10.5)
CHLORIDE SERPL-SCNC: 107 MMOL/L (ref 95–110)
CO2 SERPL-SCNC: 25 MMOL/L (ref 23–29)
CREAT SERPL-MCNC: 1.4 MG/DL (ref 0.5–1.4)
CRP SERPL-MCNC: 38.5 MG/L (ref 0–8.2)
DIFFERENTIAL METHOD: ABNORMAL
EOSINOPHIL # BLD AUTO: 0.1 K/UL (ref 0–0.5)
EOSINOPHIL NFR BLD: 2.7 % (ref 0–8)
ERYTHROCYTE [DISTWIDTH] IN BLOOD BY AUTOMATED COUNT: 17.7 % (ref 11.5–14.5)
EST. GFR  (NO RACE VARIABLE): 59 ML/MIN/1.73 M^2
GLUCOSE SERPL-MCNC: 80 MG/DL (ref 70–110)
HCT VFR BLD AUTO: 32.6 % (ref 40–54)
HGB BLD-MCNC: 9.5 G/DL (ref 14–18)
IMM GRANULOCYTES # BLD AUTO: 0.01 K/UL (ref 0–0.04)
IMM GRANULOCYTES NFR BLD AUTO: 0.2 % (ref 0–0.5)
INR PPP: 2.4 (ref 0.8–1.2)
LDH SERPL L TO P-CCNC: 229 U/L (ref 110–260)
LYMPHOCYTES # BLD AUTO: 1.1 K/UL (ref 1–4.8)
LYMPHOCYTES NFR BLD: 24 % (ref 18–48)
MAGNESIUM SERPL-MCNC: 2.1 MG/DL (ref 1.6–2.6)
MCH RBC QN AUTO: 26.7 PG (ref 27–31)
MCHC RBC AUTO-ENTMCNC: 29.1 G/DL (ref 32–36)
MCV RBC AUTO: 92 FL (ref 82–98)
MONOCYTES # BLD AUTO: 0.5 K/UL (ref 0.3–1)
MONOCYTES NFR BLD: 11.8 % (ref 4–15)
NEUTROPHILS # BLD AUTO: 2.7 K/UL (ref 1.8–7.7)
NEUTROPHILS NFR BLD: 61.1 % (ref 38–73)
NRBC BLD-RTO: 0 /100 WBC
PHOSPHATE SERPL-MCNC: 2.4 MG/DL (ref 2.7–4.5)
PLATELET # BLD AUTO: 340 K/UL (ref 150–450)
PMV BLD AUTO: 10.3 FL (ref 9.2–12.9)
POTASSIUM SERPL-SCNC: 4.4 MMOL/L (ref 3.5–5.1)
PREALB SERPL-MCNC: 15 MG/DL (ref 20–43)
PROT SERPL-MCNC: 7.6 G/DL (ref 6–8.4)
PROTHROMBIN TIME: 23.5 SEC (ref 9–12.5)
RBC # BLD AUTO: 3.56 M/UL (ref 4.6–6.2)
SODIUM SERPL-SCNC: 138 MMOL/L (ref 136–145)
WBC # BLD AUTO: 4.42 K/UL (ref 3.9–12.7)

## 2023-01-12 PROCEDURE — 99215 PR OFFICE/OUTPT VISIT, EST, LEVL V, 40-54 MIN: ICD-10-PCS | Mod: S$GLB,,, | Performed by: INTERNAL MEDICINE

## 2023-01-12 PROCEDURE — 99999 PR PBB SHADOW E&M-EST. PATIENT-LVL III: ICD-10-PCS | Mod: PBBFAC,,, | Performed by: INTERNAL MEDICINE

## 2023-01-12 PROCEDURE — 84100 ASSAY OF PHOSPHORUS: CPT | Performed by: INTERNAL MEDICINE

## 2023-01-12 PROCEDURE — 3008F PR BODY MASS INDEX (BMI) DOCUMENTED: ICD-10-PCS | Mod: CPTII,S$GLB,, | Performed by: INTERNAL MEDICINE

## 2023-01-12 PROCEDURE — 93750 INTERROGATION VAD IN PERSON: CPT | Mod: S$GLB,,, | Performed by: INTERNAL MEDICINE

## 2023-01-12 PROCEDURE — 83880 ASSAY OF NATRIURETIC PEPTIDE: CPT | Performed by: INTERNAL MEDICINE

## 2023-01-12 PROCEDURE — 93750 PR INTERROGATE VENT ASSIST DEV, IN PERSON, W PHYSICIAN ANALYSIS: ICD-10-PCS | Mod: S$GLB,,, | Performed by: INTERNAL MEDICINE

## 2023-01-12 PROCEDURE — 80053 COMPREHEN METABOLIC PANEL: CPT | Performed by: INTERNAL MEDICINE

## 2023-01-12 PROCEDURE — 3008F BODY MASS INDEX DOCD: CPT | Mod: CPTII,S$GLB,, | Performed by: INTERNAL MEDICINE

## 2023-01-12 PROCEDURE — 99215 OFFICE O/P EST HI 40 MIN: CPT | Mod: S$GLB,,, | Performed by: INTERNAL MEDICINE

## 2023-01-12 PROCEDURE — 1159F PR MEDICATION LIST DOCUMENTED IN MEDICAL RECORD: ICD-10-PCS | Mod: CPTII,S$GLB,, | Performed by: INTERNAL MEDICINE

## 2023-01-12 PROCEDURE — 36415 COLL VENOUS BLD VENIPUNCTURE: CPT | Performed by: INTERNAL MEDICINE

## 2023-01-12 PROCEDURE — 1159F MED LIST DOCD IN RCRD: CPT | Mod: CPTII,S$GLB,, | Performed by: INTERNAL MEDICINE

## 2023-01-12 PROCEDURE — 86140 C-REACTIVE PROTEIN: CPT | Performed by: INTERNAL MEDICINE

## 2023-01-12 PROCEDURE — 84134 ASSAY OF PREALBUMIN: CPT | Performed by: INTERNAL MEDICINE

## 2023-01-12 PROCEDURE — 85610 PROTHROMBIN TIME: CPT | Performed by: INTERNAL MEDICINE

## 2023-01-12 PROCEDURE — 83735 ASSAY OF MAGNESIUM: CPT | Performed by: INTERNAL MEDICINE

## 2023-01-12 PROCEDURE — 82248 BILIRUBIN DIRECT: CPT | Performed by: INTERNAL MEDICINE

## 2023-01-12 PROCEDURE — 83615 LACTATE (LD) (LDH) ENZYME: CPT | Performed by: INTERNAL MEDICINE

## 2023-01-12 PROCEDURE — 99999 PR PBB SHADOW E&M-EST. PATIENT-LVL III: CPT | Mod: PBBFAC,,, | Performed by: INTERNAL MEDICINE

## 2023-01-12 PROCEDURE — 85025 COMPLETE CBC W/AUTO DIFF WBC: CPT | Performed by: INTERNAL MEDICINE

## 2023-01-12 NOTE — LETTER
January 12, 2023        Nader Chiu  [address]  [address]  [place] [address]  Phone: [phone]  Fax: [phone]     Nader Chiu  [address]  [address]  [place] [address]  Phone: [phone]  Fax: [phone]             [facility]  [address]  [place] [address]  Phone: [phone]   Patient: Tim Richards   MR Number: [id number]   YOB: 1965   Date of Visit: 1/12/2023       Dear Dr. Nader Chiu, Nader Chiu    Thank you for referring Tim Richards to me for evaluation. Attached you will find relevant portions of my assessment and plan of care.    If you have questions, please do not hesitate to call me. I look forward to following Tim Richards along with you.    Sincerely,    Gaurav Rodriguez MD    Enclosure    If you would like to receive this communication electronically, please contact [email or web address] or [phone] to request [employer] Link access.    [employer] Link is a tool which provides read-only access to select patient information with whom you have a relationship. Its easy to use and provides real time access to review your patients record including encounter summaries, notes, results, and demographic information.    If you feel you have received this communication in error or would no longer like to receive these types of communications, please e-mail [email or web address]

## 2023-01-12 NOTE — PROGRESS NOTES
Date of Implant with Heartmate 3 LVAD: 7/13/2022    PATIENT ARRIVED IN CLINIC:  Ambulatory   Accompanied by: self. Wife is on the phone.  Complaints/reason for visit today: routine    Vitals  Temperature, oral:   Temp Readings from Last 1 Encounters:   01/12/23 97.1 °F (36.2 °C) (Oral)     Blood Pressure:   BP Readings from Last 3 Encounters:   01/12/23 (!) 90/0   12/05/22 (!) 80/0   12/03/22 108/82        VAD Interrogation:  TXP SACHI INTERROGATIONS 1/12/2023 12/3/2022 12/3/2022   Type HeartMate3 - -   Flow 5.7 - -   Speed 6400 - -   PI 1.4 - -   Power (Jackson) 5.6 - -   LSL 6000 - -   Pulsatility No Pulse No Pulse No Pulse     HCT:   Lab Results   Component Value Date    HCT 32.6 (L) 01/12/2023    HCT 31 (L) 09/27/2022       History Log: [id number]  Problems / Issues / Alarms with VAD if any: None noted  VAD Sounds: HM3 Smooth    VAD Binder With Patient: no   Reviewed VAD Numbers In Binder: no    Equipment:  Emergency Equipment With Patient: yes   Any Equipment Issues: None noted   It is medically necessary to ensure patient has properly functioning equipment and wearables to prevent infection, injury or death to patient.     DLES Assessment:  Appearance Of Driveline: 1  Antibiotics: YES vorconazole but wife states that it is $100 a month with insurance, is asking if there is another option. Message sent to NICHELLE Marc: no  Manual & Visual Inspection Of Driveline: No kinks or tears noted  Stabilization Device In Use: yes, sun securement device    Heartmate 3 Module Cable:  No yellow exposed and Attempted to unscrew modular cable to ensure it will be able to come lose in the event we ever need to change the modular cable while patient held the driveline in place so it would not move. Modular cable connection able to be unscrewed and re-tightened. Instructed pt to perform this weekly.    Patient MyChart Questionnaire:        Assessment:   PAIN: NO  Complaints Of Nausea / Vomiting: None noted    Appearance and  Frequency Of Stools: normal and formed without blood & daily  Color Of Urine: clear/yellow  Coping/Depression/Anxiety: coping okay  Sleep Habits: 7 hrs /night  Sleep Aids:  yes, xanax  Showering: Patient given permission to shower, educated on how to use the shower bag per , instructed to cover dressing with Glad Press -n- Seal or Tegaderm, never hang shower bag in the shower, explained the reason for changing dressing immediately after drying off. All questions answered.  Activity/Exercise: walking, therapy at home   Driving: Yes. Reminded to pull over should there be an alarm before looking down at controller.    DLES Dressing Care:   Frequency of Dressing Changes: every other day & daily kit  Pt In Need Of Management Kits?:no   It is medically necessary to have VAD management kits in order to prevent infection or to assist in the healing of an infected DLES.    Labs:    Chemistry        Component Value Date/Time     01/12/2023 1410    K 4.4 01/12/2023 1410     01/12/2023 1410    CO2 25 01/12/2023 1410    BUN 16 01/12/2023 1410    CREATININE 1.4 01/12/2023 1410    GLU 80 01/12/2023 1410        Component Value Date/Time    CALCIUM 9.3 01/12/2023 1410    ALKPHOS 95 01/12/2023 1410    AST 35 01/12/2023 1410    ALT 26 01/12/2023 1410    BILITOT 0.4 01/12/2023 1410    ESTGFRAFRICA 37.6 (A) 07/31/2022 2027    EGFRNONAA 32.5 (A) 07/31/2022 2027            Magnesium   Date Value Ref Range Status   01/12/2023 2.1 1.6 - 2.6 mg/dL Final       Lab Results   Component Value Date    WBC 4.42 01/12/2023    HGB 9.5 (L) 01/12/2023    HCT 32.6 (L) 01/12/2023    MCV 92 01/12/2023     01/12/2023       Lab Results   Component Value Date    INR 2.4 (H) 01/12/2023    INR 2.6 (H) 01/12/2023    INR 2.3 (H) 01/09/2023       BNP   Date Value Ref Range Status   01/12/2023 1,680 (H) 0 - 99 pg/mL Final     Comment:     Values of less than 100 pg/ml are consistent with non-CHF populations.   12/01/2022 309  (H) 0 - 99 pg/mL Final     Comment:     Values of less than 100 pg/ml are consistent with non-CHF populations.   11/03/2022 599 (H) 0 - 99 pg/mL Final     Comment:     Values of less than 100 pg/ml are consistent with non-CHF populations.       LD   Date Value Ref Range Status   01/12/2023 229 110 - 260 U/L Final     Comment:     Results are increased in hemolyzed samples.   12/03/2022 197 110 - 260 U/L Final     Comment:     Results are increased in hemolyzed samples.   12/01/2022 240 110 - 260 U/L Final     Comment:     Results are increased in hemolyzed samples.       Labs reviewed with patient: YES     Medication Reconciliation: per MA.  New Medication Detail Provided: no  Coumadin Managed by: [facility]    Education: Reviewed driveline care, emergency procedures, how to change the controller, alarms with patient, as well as discussed how to page the VAD coordinator in case of an emergency.   Educated patient/family that chest compressions are allowed in the event they are needed.    Reminded patient/caregiver not to touch their face and to cover their mouth when they cough or sneeze.   Vaccines: Pt informed that we are encouraging all VAD patients to receive the Flu and COVID vaccines and boosters. Informed pt that they can take tylenol but should avoid other NSAIDs.       Plans/Needs: Routine f/u. Doppler borderline high at 90. Pt was previously on amlodipine but he stopped this after he had orthostatic light headedness. Dr. Rodriguez discussed starting amlodipine at 2.5 mg but pt wanted to defer for now.  He says that his blood pressures are generally higher in clinic, and this is because they usually measure it right after he walks from the parking lot to the hospital. If still high after patient sitting for a while, will re-discuss amlodipine at next visit.  Pt to have a CT today for suspicious area of inflammation just below sternum. Appears to be a non strangulated ventral hernia.   Pt OK'd for  home INR meter by Dr. Rhodes, will get this ordered but explained to patient that he will  be called by coumadin clinic to review insurance and cost.     RTC 1 month with FLP.     Replaced batteries 4 and 7, patient did not bring the others.      [id number]  [id number] 2/2023 9/2025   [id number]  [id number] 2/2023 9/2025       Hurricane Season: No

## 2023-01-12 NOTE — PROGRESS NOTES
Subjective:   Patient ID:  Tim Richards is a 57 y.o. male who presents for LVAD followup visit.    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      Post-op course was prolonged and complicated by worsening cardiogenic shock/RV failure requiring VA-ECMO that was successfully decannulated and Klebsiella aerogenes pneumonia. He also had episodes of VT with ICD discharge 7/21 requiring amio and NSVT episodes requiring addition of lidocaine gtt. This was transitioned to PO amiodarone and mexiletine. He unfortunately required re-intubation 7/29 for hypercapneic respiratory failure after initial extubation 7/27 and developed Aflutter with RVR with drop in PI and BP requiring DCCV. He ultimately underwent tracheostomy 8/4 and weaned off Epi 8/8. He briefly required TPN. He was transitioned back to enteral feeding and subsequently improved from a swallow standpoint and tolerated regular diet. He was having issues with vertigo-like symptoms with unremarkable ENT workup and negative CT heads. Due to higher prolonged doses of mexiletine during hospitalization, this was reduced to 250 mg TID. Amiodarone was adjusted after note of some CT liver parenchyma attenuation with report of copper/iron/med deposition but due to reduction of mexiletine, this was increased to 400 mg daily. The CT had also shown possible signs suggestive of avascular necrosis of the femoral head and discussion with Endocrinology was held to wean steroid dosing for  amiodarone-induced hyperthyroidism with recommendations for prednisone 5 mg daily to be continued through 8/31 then discontinued. He remained on methimazole. At the time of discharge, he had a size 8 cuffed Shiley trach which was subsequently removed.      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 He was on ertapenem and voriconazole for this. I saw him in clinic last 6 weeks ago. After I saw him he was admitted at the request of ID after US right groin showed right inguinal SC cystic struct ID was consulted and rec that the Erta and Voriconazole be continued.ure favored to represent a seroma or lymphocele. . IR, Vascular Surgery and Plastics were consulted but the decision was made by Dr. Rhodes to have patient seen by Dr. Kaufman in CTS first. Dr Kaufman saw him 4 days later and said no intervention needed. He then saw ID on 1/6/22 and they stopped the ertapenem and DC PICC line. He continues on voricaonazole, with duration being 6 months minimum to potentially lifelong.    He presents today for follow-up.  Overall he says that he has been doing well.  He did have 1 mechanical fall approximately 1 week prior.  He was reaching for a trash can hear his couch and tripped and fell.  He did hit his head, with a small laceration above his right eyebrow.  No headaches, or loss of consciousness.    He has been noticing reduced discharge from his right groin area.  No significant discharge from his driveline.  Denies any cardiovascular symptoms.  He denies any exertional chest discomfort.  Says that he gets short of breath after walking approximately half a  "block, but this is improving over time.  Denies any palpitations, syncope.  Denies any leg swelling, PND, or orthopnea.  Does note occasional orthostatic lightheadedness.  Of note, when he was discharged from the hospital he was started on amlodipine.  However after taking this, he felt quite lightheaded frequently especially when he goes from sitting to standing.  His home health measured his blood pressure, with an electronic cough, as well as with a manual cuff with stethoscope, never Doppler.  They had mentioned to him that his blood pressure was in the 60s, and as such he stopped taking his amlodipine.    He also noted an anterior wall swelling over the past few weeks.  This has been slow in growth.  He denies any GI symptoms such as nausea, vomiting, constipation, changes in appetite.      Review of Systems   Constitutional: Negative for chills and fever.   HENT:  Negative for hearing loss.    Eyes:  Negative for visual disturbance.   Cardiovascular:  Positive for dyspnea on exertion and near-syncope. Negative for chest pain, irregular heartbeat, leg swelling, orthopnea, palpitations, paroxysmal nocturnal dyspnea and syncope.   Respiratory:  Positive for shortness of breath. Negative for cough.    Musculoskeletal:  Negative for muscle weakness.   Gastrointestinal:  Negative for diarrhea, nausea and vomiting.   Neurological:  Negative for focal weakness.   Psychiatric/Behavioral:  Negative for memory loss.      Objective:   Blood pressure (!) 90/0, temperature 97.1 °F (36.2 °C), temperature source Oral, height 6' 1" (1.854 m), weight 98.4 kg (217 lb).body mass index is 28.63 kg/m².    Doppler: 90    Physical Exam  Vitals reviewed.   Constitutional:       General: He is not in acute distress.  HENT:      Head: Atraumatic.   Eyes:      Extraocular Movements: Extraocular movements intact.   Cardiovascular:      Comments: LVAD hum present. JVP not visible at 45 degrees.  Pulmonary:      Breath sounds: Normal breath " sounds.   Abdominal:      Palpations: Abdomen is soft. There is mass (Anterior abdominal soft swelling, reducible, non tender. BS+).      Tenderness: There is no abdominal tenderness.   Musculoskeletal:         General: Normal range of motion.      Right lower leg: No edema.      Left lower leg: No edema.   Neurological:      General: No focal deficit present.      Mental Status: He is alert and oriented to person, place, and time.       Lab Results   Component Value Date    WBC 4.42 01/12/2023    HGB 9.5 (L) 01/12/2023    HCT 32.6 (L) 01/12/2023    MCV 92 01/12/2023     01/12/2023    CO2 25 01/12/2023    CREATININE 1.4 01/12/2023    CALCIUM 9.3 01/12/2023    ALBUMIN 3.2 (L) 01/12/2023    AST 35 01/12/2023    BNP 1,680 (H) 01/12/2023    ALT 26 01/12/2023     01/12/2023       Lab Results   Component Value Date    INR 2.4 (H) 01/12/2023    INR 2.6 (H) 01/12/2023    INR 2.3 (H) 01/09/2023       BNP   Date Value Ref Range Status   01/12/2023 1,680 (H) 0 - 99 pg/mL Final     Comment:     Values of less than 100 pg/ml are consistent with non-CHF populations.   12/01/2022 309 (H) 0 - 99 pg/mL Final     Comment:     Values of less than 100 pg/ml are consistent with non-CHF populations.   11/03/2022 599 (H) 0 - 99 pg/mL Final     Comment:     Values of less than 100 pg/ml are consistent with non-CHF populations.       LD   Date Value Ref Range Status   01/12/2023 229 110 - 260 U/L Final     Comment:     Results are increased in hemolyzed samples.   12/03/2022 197 110 - 260 U/L Final     Comment:     Results are increased in hemolyzed samples.   12/01/2022 240 110 - 260 U/L Final     Comment:     Results are increased in hemolyzed samples.       Labs were reviewed with the patient.    No results found for this or any previous visit.    No results found for this or any previous visit.      Assessment:      1. Chronic combined systolic and diastolic congestive heart failure    2. LVAD (left ventricular assist  device) present    3. Dilated cardiomyopathy    4. Pseudoaneurysm of right femoral artery        Plan:     S/P LVAD  AC with Warfarin  HTN  - Doing well, no CV complaints, clinically euvolemic  - DL is a 1  - Doppler borderline high at 90  - Was previously on amlodipine but he stopped this after he had orthostatic light headedness  - I discussed starting amlodipine at 2.5 mg with him but he wanted to defer for now.  He says that his blood pressures are generally higher in clinic, and this is because they usually measure it right after he walks from the parking lot to the hospital.  I told him to mention to the roomer next time to check his blood pressure after he has been sitting for 2 minutes.  If still high, he is open to starting antihypertensive therapy again.  - continue other medical therapy unchanged.    Infected R groin pseudoaneurysm  - followed by Infectious Disease, plastic surgery.  Currently on voriconazole.  Seen by CTS, no intervention required at this point.    Ventral hernia  - his getting a CT scan of his abdomen.  Appears to be a non strangulated ventral hernia.  Will discuss findings with CTS after results.    Patient is now NYHA II  Recommend 2 gram sodium restriction and 1500cc fluid restriction.  Encourage physical activity with graded exercise program.  Requested patient to weigh themselves daily, and to notify us if their weight increases by more than 3 lbs in 1 day or 5 lbs in 1 week.     Patient advised that it is recommended that all patients, and their close contacts and household members receive Covid vaccination.    Listed for transplant: No    UNOS Patient Status  Functional Status: 90% - Able to carry on normal activity: minor symptoms of disease  Physical Capacity: No Limitations  Working for Income: No  If no, reason not working: Disability    Additionally, the patient has a patient centered goal of being able to improve their quality of life

## 2023-01-12 NOTE — PROGRESS NOTES
Kelly/wife states patient now wants INR meter, she confirmed new insurance [employer]age and confirmed address.  Chart routed to LPNs to contact LVAD Team/Dr Rhodes for approval for LVAD patient to use INR meter per their protocol.  Wife instructed that once [facility] receives required documents and insurance approval they will contact patient with cost of use of meter and require patient's verbal consent to process for meter which she verbalized understanding.        Coverage information:     Subscriber: [id number] JESUS PHELPS     Rel to sub: 01 - Self     Member ID: [id number]     Payor: [id number]IC[employer] Ph: [phone]     Benefit plan: [id number]     Group number: [id number]     Member effective dates: from 12/01/22      [address] [facility] [address]

## 2023-01-13 ENCOUNTER — DOCUMENT SCAN (OUTPATIENT)
Dept: HOME HEALTH SERVICES | Facility: HOSPITAL | Age: 58
End: 2023-01-13
Payer: MEDICARE

## 2023-01-13 ENCOUNTER — TELEPHONE (OUTPATIENT)
Dept: TRANSPLANT | Facility: CLINIC | Age: 58
End: 2023-01-13

## 2023-01-13 NOTE — TELEPHONE ENCOUNTER
Attempted to contact patient regarding canceling weekly IV ABX labs as he is no longer on IV abx, also calling to ask patient about a home INR meter and lastly about his canceled CT scan. No answer, vociemail left. Will continue to follow up.

## 2023-01-13 NOTE — PROGRESS NOTES
Physician Order Form (POF) faxed to Dr. Rhodes's staff for signature, and patient patient demographics faxed to Em.

## 2023-01-14 ENCOUNTER — PATIENT MESSAGE (OUTPATIENT)
Dept: PHARMACY | Facility: CLINIC | Age: 58
End: 2023-01-14
Payer: MEDICARE

## 2023-01-19 LAB
ACID FAST MOD KINY STN SPEC: NORMAL
MYCOBACTERIUM SPEC QL CULT: NORMAL

## 2023-01-19 NOTE — PROGRESS NOTES
1/19/23-Spoke to patien regarding missed INR. Gricel at [facility] stated patient was discharged on 1/16/23. Patient confirmed that he was discharged and will have INR's at [facility] going forward.

## 2023-01-20 ENCOUNTER — PATIENT MESSAGE (OUTPATIENT)
Dept: CARDIOLOGY | Facility: CLINIC | Age: 58
End: 2023-01-20
Payer: MEDICARE

## 2023-01-20 ENCOUNTER — HOSPITAL ENCOUNTER (OUTPATIENT)
Dept: RADIOLOGY | Facility: HOSPITAL | Age: 58
Discharge: HOME | End: 2023-01-20
Attending: THORACIC SURGERY (CARDIOTHORACIC VASCULAR SURGERY)
Payer: MEDICARE

## 2023-01-20 PROCEDURE — 74176 CT ABD & PELVIS W/O CONTRAST: CPT | Mod: 26,,, | Performed by: STUDENT IN AN ORGANIZED HEALTH CARE EDUCATION/TRAINING PROGRAM

## 2023-01-20 PROCEDURE — 71250 CT CHEST ABDOMEN PELVIS WITHOUT CONTRAST(XPD): ICD-10-PCS | Mod: 26,,, | Performed by: STUDENT IN AN ORGANIZED HEALTH CARE EDUCATION/TRAINING PROGRAM

## 2023-01-20 PROCEDURE — 71250 CT THORAX DX C-: CPT | Mod: 26,,, | Performed by: STUDENT IN AN ORGANIZED HEALTH CARE EDUCATION/TRAINING PROGRAM

## 2023-01-20 PROCEDURE — 74176 CT CHEST ABDOMEN PELVIS WITHOUT CONTRAST(XPD): ICD-10-PCS | Mod: 26,,, | Performed by: STUDENT IN AN ORGANIZED HEALTH CARE EDUCATION/TRAINING PROGRAM

## 2023-01-20 PROCEDURE — 74176 CT ABD & PELVIS W/O CONTRAST: CPT | Mod: TC

## 2023-01-20 NOTE — PROGRESS NOTES
I spoke to patient regarding 1/19/23 missed INR and mychart message that he sent. I advised patient that we may not get his INR results by the end of the day today depending on what time he get to the lab, patient verbalized understanding. Patient rescheduled INR for 1/23/23.

## 2023-01-23 ENCOUNTER — DOCUMENTATION ONLY (OUTPATIENT)
Dept: CARDIOTHORACIC SURGERY | Facility: CLINIC | Age: 58
End: 2023-01-23
Payer: MEDICARE

## 2023-01-23 ENCOUNTER — PATIENT MESSAGE (OUTPATIENT)
Dept: TRANSPLANT | Facility: CLINIC | Age: 58
End: 2023-01-23
Payer: MEDICARE

## 2023-01-23 NOTE — PROGRESS NOTES
Dr. Kaufman reviewed pt's CT scan C/A/P, performed on 1/20, and stated that he would like the pt to follow up as soon as possible with Dr. Hernández in Vascular Surgery, based on the findings of the CT scan.  A high priority message was sent to Dr. Hernández's inbasket regarding Dr. Kaufman's request for pt to follow up with Vascular Surgery as soon as possible.  Called and notified pt that Dr. Kaufman would like him to follow up with Vascular Surgery regarding his CT results, and to expect a phone call from the Vascular Surgery Clinic to schedule an appt, which he verbalized understanding to.

## 2023-01-24 ENCOUNTER — ANTI-COAG VISIT (OUTPATIENT)
Dept: CARDIOLOGY | Facility: CLINIC | Age: 58
End: 2023-01-24
Payer: MEDICARE

## 2023-01-24 PROCEDURE — 93793 PR ANTICOAGULANT MGMT FOR PT TAKING WARFARIN: ICD-10-PCS | Mod: S$GLB,,,

## 2023-01-24 PROCEDURE — 93793 ANTICOAG MGMT PT WARFARIN: CPT | Mod: S$GLB,,,

## 2023-01-26 ENCOUNTER — HOSPITAL ENCOUNTER (INPATIENT)
Facility: HOSPITAL | Age: 58
LOS: 3 days | Discharge: HOME | DRG: 299 | End: 2023-01-29
Attending: INTERNAL MEDICINE | Admitting: INTERNAL MEDICINE
Payer: MEDICARE

## 2023-01-26 ENCOUNTER — TELEPHONE (OUTPATIENT)
Dept: TRANSPLANT | Facility: CLINIC | Age: 58
End: 2023-01-26
Payer: MEDICARE

## 2023-01-26 DIAGNOSIS — T82.9XXD LEFT VENTRICULAR ASSIST DEVICE (LVAD) COMPLICATION, SUBSEQUENT ENCOUNTER: Primary | ICD-10-CM

## 2023-01-26 DIAGNOSIS — Z95.811 LVAD (LEFT VENTRICULAR ASSIST DEVICE) PRESENT: ICD-10-CM

## 2023-01-26 DIAGNOSIS — Z95.811 HEART REPLACED BY HEART ASSIST DEVICE: ICD-10-CM

## 2023-01-26 PROCEDURE — 20600001 HC STEP DOWN PRIVATE ROOM

## 2023-01-26 NOTE — TELEPHONE ENCOUNTER
Informed by Dr. Kaufman that he spoke with Dr. Hernández regarding pt's CT results. Pt needs to be admitted for surgery next week to address:    resection of the right common femoral artery and creation of right iliofemoral bypass for treatment of mycotic femoral artery aneurysm. Increased size of lobulated mixed attenuation collection within the right inguinal region measuring approximately 10.0 cm x 6.1 cm.  May have been present on previous ultrasound although more conspicuous/larger and concerning for hematoma.  There is an organized simple fluid attenuating component within the inferolateral aspect of this collection measuring 3.3 x 3.8 cm (axial series 3, image 173), corresponds with cystic structure described on ultrasound.  Multiple surgical clips within the right inguinal region.    I called and spoke with the patient who needs to speak with his wife on when he can be brought in. I informed patient the he needs to come for admission no later than tomorrow. Pt verbalized understanding and wll call me back today.

## 2023-01-26 NOTE — TELEPHONE ENCOUNTER
Patient's wife called to review and confirm regarding patient's CT results, need for admission and surgery. Wife verbalized understanding and will bring patient for admission by 7:30-8 in the morning. I advised for patient to stop his coumadin, do not take tonight. Orders sent to admission and placed in epic. Notified Kelly Da Silva RN with CTS who will inform Dr. Kaufman and Dr. Hernández. Also informed HTS Cassandra and MD.

## 2023-01-27 PROBLEM — E05.80 AMIODARONE-INDUCED HYPERTHYROIDISM: Status: RESOLVED | Noted: 2020-01-13 | Resolved: 2023-01-27

## 2023-01-27 PROBLEM — T46.2X5A AMIODARONE-INDUCED HYPERTHYROIDISM: Status: RESOLVED | Noted: 2020-01-13 | Resolved: 2023-01-27

## 2023-01-27 LAB
ALBUMIN SERPL BCP-MCNC: 2.7 G/DL (ref 3.5–5.2)
ALP SERPL-CCNC: 77 U/L (ref 55–135)
ALT SERPL W/O P-5'-P-CCNC: 21 U/L (ref 10–44)
ANION GAP SERPL CALC-SCNC: 8 MMOL/L (ref 8–16)
AST SERPL-CCNC: 30 U/L (ref 10–40)
BASOPHILS # BLD AUTO: 0.01 K/UL (ref 0–0.2)
BASOPHILS NFR BLD: 0.2 % (ref 0–1.9)
BILIRUB SERPL-MCNC: 0.3 MG/DL (ref 0.1–1)
BNP SERPL-MCNC: 521 PG/ML (ref 0–99)
BUN SERPL-MCNC: 12 MG/DL (ref 6–20)
CALCIUM SERPL-MCNC: 8.9 MG/DL (ref 8.7–10.5)
CHLORIDE SERPL-SCNC: 106 MMOL/L (ref 95–110)
CO2 SERPL-SCNC: 24 MMOL/L (ref 23–29)
CREAT SERPL-MCNC: 1.4 MG/DL (ref 0.5–1.4)
CRP SERPL-MCNC: 69.1 MG/L (ref 0–8.2)
DIFFERENTIAL METHOD BLD: ABNORMAL
EOSINOPHIL # BLD AUTO: 0.2 K/UL (ref 0–0.5)
EOSINOPHIL NFR BLD: 2.7 % (ref 0–8)
ERYTHROCYTE [DISTWIDTH] IN BLOOD BY AUTOMATED COUNT: 17 % (ref 11.5–14.5)
EST. GFR  (NO RACE VARIABLE): 59 ML/MIN/1.73 M^2
GLUCOSE SERPL-MCNC: 79 MG/DL (ref 70–110)
HCT VFR BLD AUTO: 28.8 % (ref 40–54)
HGB BLD-MCNC: 8.3 G/DL (ref 14–18)
IMM GRANULOCYTES # BLD AUTO: 0.02 K/UL (ref 0–0.04)
IMM GRANULOCYTES NFR BLD AUTO: 0.4 % (ref 0–0.5)
INR PPP: 3.1 (ref 0.8–1.2)
LYMPHOCYTES # BLD AUTO: 0.8 K/UL (ref 1–4.8)
LYMPHOCYTES NFR BLD: 15.1 % (ref 18–48)
MCH RBC QN AUTO: 25.9 PG (ref 27–31)
MCHC RBC AUTO-ENTMCNC: 28.8 G/DL (ref 32–36)
MCV RBC AUTO: 90 FL (ref 82–98)
MONOCYTES # BLD AUTO: 0.6 K/UL (ref 0.3–1)
MONOCYTES NFR BLD: 10.6 % (ref 4–15)
NEUTROPHILS # BLD AUTO: 4 K/UL (ref 1.8–7.7)
NEUTROPHILS NFR BLD: 71 % (ref 38–73)
NRBC BLD-RTO: 0 /100 WBC
PLATELET # BLD AUTO: 374 K/UL (ref 150–450)
PMV BLD AUTO: 10.5 FL (ref 9.2–12.9)
POTASSIUM SERPL-SCNC: 4.1 MMOL/L (ref 3.5–5.1)
PROT SERPL-MCNC: 7 G/DL (ref 6–8.4)
PROTHROMBIN TIME: 30.9 SEC (ref 9–12.5)
RBC # BLD AUTO: 3.21 M/UL (ref 4.6–6.2)
SODIUM SERPL-SCNC: 138 MMOL/L (ref 136–145)
WBC # BLD AUTO: 5.56 K/UL (ref 3.9–12.7)

## 2023-01-27 PROCEDURE — 99223 PR INITIAL HOSPITAL CARE,LEVL III: ICD-10-PCS | Mod: ,,, | Performed by: INTERNAL MEDICINE

## 2023-01-27 PROCEDURE — 85025 COMPLETE CBC W/AUTO DIFF WBC: CPT | Performed by: STUDENT IN AN ORGANIZED HEALTH CARE EDUCATION/TRAINING PROGRAM

## 2023-01-27 PROCEDURE — 25000003 PHARM REV CODE 250: Performed by: STUDENT IN AN ORGANIZED HEALTH CARE EDUCATION/TRAINING PROGRAM

## 2023-01-27 PROCEDURE — 99223 1ST HOSP IP/OBS HIGH 75: CPT | Mod: ,,, | Performed by: INTERNAL MEDICINE

## 2023-01-27 PROCEDURE — 63600175 PHARM REV CODE 636 W HCPCS: Performed by: STUDENT IN AN ORGANIZED HEALTH CARE EDUCATION/TRAINING PROGRAM

## 2023-01-27 PROCEDURE — 86140 C-REACTIVE PROTEIN: CPT | Performed by: STUDENT IN AN ORGANIZED HEALTH CARE EDUCATION/TRAINING PROGRAM

## 2023-01-27 PROCEDURE — 20600001 HC STEP DOWN PRIVATE ROOM

## 2023-01-27 PROCEDURE — 93750 INTERROGATION VAD IN PERSON: CPT | Mod: ,,, | Performed by: INTERNAL MEDICINE

## 2023-01-27 PROCEDURE — 27000248 HC VAD-ADDITIONAL DAY

## 2023-01-27 PROCEDURE — 36415 COLL VENOUS BLD VENIPUNCTURE: CPT | Performed by: STUDENT IN AN ORGANIZED HEALTH CARE EDUCATION/TRAINING PROGRAM

## 2023-01-27 PROCEDURE — 93750 PR INTERROGATE VENT ASSIST DEV, IN PERSON, W PHYSICIAN ANALYSIS: ICD-10-PCS | Mod: ,,, | Performed by: INTERNAL MEDICINE

## 2023-01-27 PROCEDURE — 85610 PROTHROMBIN TIME: CPT | Performed by: STUDENT IN AN ORGANIZED HEALTH CARE EDUCATION/TRAINING PROGRAM

## 2023-01-27 PROCEDURE — 83880 ASSAY OF NATRIURETIC PEPTIDE: CPT | Performed by: STUDENT IN AN ORGANIZED HEALTH CARE EDUCATION/TRAINING PROGRAM

## 2023-01-27 PROCEDURE — 80053 COMPREHEN METABOLIC PANEL: CPT | Performed by: STUDENT IN AN ORGANIZED HEALTH CARE EDUCATION/TRAINING PROGRAM

## 2023-01-27 RX ORDER — AMIODARONE HYDROCHLORIDE 200 MG/1
400 TABLET ORAL DAILY
Status: DISCONTINUED | OUTPATIENT
Start: 2023-01-27 | End: 2023-01-30 | Stop reason: HOSPADM

## 2023-01-27 RX ORDER — MEXILETINE HYDROCHLORIDE 250 MG/1
250 CAPSULE ORAL EVERY 8 HOURS
Status: DISCONTINUED | OUTPATIENT
Start: 2023-01-27 | End: 2023-01-30 | Stop reason: HOSPADM

## 2023-01-27 RX ORDER — LEVOTHYROXINE SODIUM 88 UG/1
88 TABLET ORAL
Status: DISCONTINUED | OUTPATIENT
Start: 2023-01-28 | End: 2023-01-30 | Stop reason: HOSPADM

## 2023-01-27 RX ORDER — SODIUM CHLORIDE 0.9 % (FLUSH) 0.9 %
10 SYRINGE (ML) INJECTION
Status: DISCONTINUED | OUTPATIENT
Start: 2023-01-27 | End: 2023-01-30 | Stop reason: HOSPADM

## 2023-01-27 RX ORDER — MIRTAZAPINE 15 MG/1
30 TABLET, FILM COATED ORAL NIGHTLY
Status: DISCONTINUED | OUTPATIENT
Start: 2023-01-27 | End: 2023-01-30 | Stop reason: HOSPADM

## 2023-01-27 RX ORDER — AMLODIPINE BESYLATE 5 MG/1
5 TABLET ORAL DAILY
Status: DISCONTINUED | OUTPATIENT
Start: 2023-01-27 | End: 2023-01-30 | Stop reason: HOSPADM

## 2023-01-27 RX ORDER — VORICONAZOLE 200 MG/1
200 TABLET, FILM COATED ORAL 2 TIMES DAILY
Status: DISCONTINUED | OUTPATIENT
Start: 2023-01-27 | End: 2023-01-30 | Stop reason: HOSPADM

## 2023-01-27 RX ORDER — FUROSEMIDE 10 MG/ML
80 INJECTION INTRAMUSCULAR; INTRAVENOUS ONCE
Status: COMPLETED | OUTPATIENT
Start: 2023-01-27 | End: 2023-01-27

## 2023-01-27 RX ORDER — PANTOPRAZOLE SODIUM 40 MG/1
40 TABLET, DELAYED RELEASE ORAL
Status: DISCONTINUED | OUTPATIENT
Start: 2023-01-27 | End: 2023-01-30 | Stop reason: HOSPADM

## 2023-01-27 RX ORDER — ALPRAZOLAM 0.5 MG/1
0.5 TABLET ORAL 3 TIMES DAILY PRN
Status: DISCONTINUED | OUTPATIENT
Start: 2023-01-27 | End: 2023-01-30 | Stop reason: HOSPADM

## 2023-01-27 RX ORDER — LANOLIN ALCOHOL/MO/W.PET/CERES
400 CREAM (GRAM) TOPICAL 2 TIMES DAILY
Status: DISCONTINUED | OUTPATIENT
Start: 2023-01-27 | End: 2023-01-30 | Stop reason: HOSPADM

## 2023-01-27 RX ADMIN — PANTOPRAZOLE SODIUM 40 MG: 40 TABLET, DELAYED RELEASE ORAL at 12:01

## 2023-01-27 RX ADMIN — MEXILETINE HYDROCHLORIDE 250 MG: 250 CAPSULE ORAL at 01:01

## 2023-01-27 RX ADMIN — FUROSEMIDE 80 MG: 10 INJECTION, SOLUTION INTRAMUSCULAR; INTRAVENOUS at 05:01

## 2023-01-27 RX ADMIN — AMLODIPINE BESYLATE 5 MG: 5 TABLET ORAL at 05:01

## 2023-01-27 RX ADMIN — Medication 400 MG: at 08:01

## 2023-01-27 RX ADMIN — MIRTAZAPINE 30 MG: 15 TABLET, FILM COATED ORAL at 08:01

## 2023-01-27 RX ADMIN — MEXILETINE HYDROCHLORIDE 250 MG: 250 CAPSULE ORAL at 10:01

## 2023-01-27 RX ADMIN — VORICONAZOLE 200 MG: 200 TABLET, FILM COATED ORAL at 08:01

## 2023-01-27 RX ADMIN — ALPRAZOLAM 0.5 MG: 0.5 TABLET ORAL at 05:01

## 2023-01-27 NOTE — SUBJECTIVE & OBJECTIVE
Past Medical History:   Diagnosis Date    A-fib     Anticoagulant long-term use     Atrial flutter 7/30/2022    CHF (congestive heart failure)     Class 1 obesity due to excess calories with serious comorbidity and body mass index (BMI) of 31.0 to 31.9 in adult     Class 1 obesity due to excess calories with serious comorbidity and body mass index (BMI) of 32.0 to 32.9 in adult     Dilated cardiomyopathy 1/10/2018    Disorder of kidney and ureter     CKD    Encounter for blood transfusion     Essential hypertension 8/28/2022    Gout     HTN (hypertension)     Hx of psychiatric care     ICD (implantable cardioverter-defibrillator) infection 7/1/2020    Psychiatric problem     Thyroid disease     Ventricular tachycardia (paroxysmal)        Past Surgical History:   Procedure Laterality Date    AORTIC VALVULOPLASTY N/A 7/13/2022    Procedure: REPAIR, AORTIC VALVE;  Surgeon: Yg Kaufman MD;  Location: [facility] OR [address] [place] [address];  Service: Cardiovascular;  Laterality: N/A;    APPLICATION OF WOUND VACUUM-ASSISTED CLOSURE DEVICE N/A 7/15/2022    Procedure: APPLICATION, WOUND VAC;  Surgeon: Yg Kaufman MD;  [facility];  Service: Cardiovascular;  Laterality: N/A;  50 x 5 cm     APPLICATION OF WOUND VACUUM-ASSISTED CLOSURE DEVICE Right 9/27/2022    Procedure: APPLICATION, WOUND VAC;  Surgeon: Kole Tabares MD;  Location: [facility] OR [address] [place] [address];  Service: Plastics;  Laterality: Right;    CARDIAC CATHETERIZATION  Dec. 2012    CARDIAC DEFIBRILLATOR PLACEMENT Left     CRRT-D    COLONOSCOPY N/A 3/6/2018    Procedure: COLONOSCOPY;  Surgeon: Alonso Bone MD;  Location: [facility] ([address] [place] [address]);  Service: Endoscopy;  Laterality: N/A;    COLONOSCOPY N/A 7/17/2019    Procedure: COLONOSCOPY;  Surgeon: Blane Valdez MD;  [facility]  Service: Endoscopy;  Laterality: N/A;    COLONOSCOPY N/A 7/18/2019    Procedure: COLONOSCOPY;  Surgeon: Blane Valdez MD;  Location: [facility] ENDO ([address] [place] [address]);  Service: Endoscopy;   Laterality: N/A;    CREATION OF ILIOFEMORAL ARTERY BYPASS Right 9/27/2022    Procedure: CREATION, BYPASS, ARTERIAL, ILIAC TO FEMORAL WITH GRAFT;  Surgeon: Zach Hernández MD;  Location: [facility] OR [facility] FLR;  Service: Peripheral Vascular;  Laterality: Right;    CREATION OF MUSCLE ROTATIONAL FLAP Right 9/27/2022    Procedure: CREATION, FLAP, MUSCLE ROTATION, SARTORIUS AND RECTUS FEMORIS;  Surgeon: Kole Tabares MD;  Location: [facility] OR [facility] FLR;  Service: Plastics;  Laterality: Right;    ESOPHAGOGASTRODUODENOSCOPY N/A 7/17/2019    Procedure: EGD (ESOPHAGOGASTRODUODENOSCOPY);  Surgeon: Blane Valdez MD;  Location: [facility] ENDO (2ND FLR);  Service: Endoscopy;  Laterality: N/A;    ESOPHAGOGASTRODUODENOSCOPY N/A 7/18/2019    Procedure: EGD (ESOPHAGOGASTRODUODENOSCOPY);  Surgeon: Blane Valdez MD;  Location: [facility] ENDO (2ND FLR);  Service: Endoscopy;  Laterality: N/A;    FOREIGN BODY REMOVAL N/A 7/22/2022    Procedure: REMOVAL, FOREIGN BODY;  Surgeon: Yg Kaufman MD;  Location: [facility] OR [facility] FLR;  Service: Cardiovascular;  Laterality: N/A;  LVAD Heartmate 2 drive line removal    INSERTION OF GRAFT TO PERICARDIUM N/A 7/15/2022    Procedure: INSERTION, GRAFT, PERICARDIUM;  Surgeon: Yg Kaufman MD;  [facility];  Service: Cardiovascular;  Laterality: N/A;    IRRIGATION OF MEDIASTINUM N/A 7/15/2022    Procedure: IRRIGATION, MEDIASTINUM;  Surgeon: Yg Kaufman MD;  [facility];  Service: Cardiovascular;  Laterality: N/A;    LYSIS OF ADHESIONS  7/13/2022    Procedure: LYSIS, ADHESIONS;  Surgeon: Yg Kaufman MD;  Location: [facility] OR [facility] FLR;  Service: Cardiovascular;;    NONINVASIVE CARDIAC ELECTROPHYSIOLOGY STUDY N/A 10/18/2019    Procedure: CARDIAC ELECTROPHYSIOLOGY STUDY, NONINVASIVE;  Surgeon: Raz Wagner MD;  Location: [facility] EP LAB;  Service: Cardiology;  Laterality: N/A;  VT, DFTs, MDT CRTD in situ, LVAD, anes, MB, 3098    REPLACEMENT OF IMPLANTABLE CARDIOVERTER-DEFIBRILLATOR (ICD)  GENERATOR N/A 3/9/2020    Procedure: REPLACEMENT, ICD GENERATOR;  Surgeon: Harry Yun MD;  Location: [facility] EP LAB;  Service: Cardiology;  Laterality: N/A;  VT, ICD Gen Change and Lead Revision, MDT, MAC, DM,3 Prep    REPLACEMENT OF LEFT VENTRICULAR ASSIST DEVICE (LVAD)  7/13/2022    Procedure: REPLACEMENT, LVAD;  Surgeon: Yg Kaufman MD;  [facility];  Service: Cardiovascular;;    REPLACEMENT OF PUMP N/A 7/13/2022    Procedure: REPLACEMENT, PUMP;  Surgeon: Yg Kaufman MD;  [facility];  Service: Cardiovascular;  Laterality: N/A;  LVAD pump exchange  EXPLANATION OF HEATMATE 2  IMPLANTATION OF HEARTMATE 3  IMPLANTATION OF 8MM CHIMNEY GRAFT TO RFA  INITIATION OF ECMO  TEMPORARY CLOSURE OF CHEST    REVISION OF IMPLANTABLE CARDIOVERTER-DEFIBRILLATOR (ICD) ELECTRODE LEAD PLACEMENT N/A 3/9/2020    Procedure: REVISION, INSERTION, ELECTRODE LEAD, ICD;  Surgeon: Harry Yun MD;  Location: [facility] EP LAB;  Service: Cardiology;  Laterality: N/A;  VT, ICD Gen Change and Lead Revision, MDT, MAC, DM,3 Prep    STERNAL WOUND CLOSURE N/A 7/14/2022    Procedure: CLOSURE, WOUND, STERNUM;  Surgeon: Yg Kaufman MD;  [facility];  Service: Cardiovascular;  Laterality: N/A;  temporary closure  evacuation of hematoma    STERNAL WOUND CLOSURE N/A 7/15/2022    Procedure: CLOSURE, WOUND, STERNUM;  Surgeon: Yg Kaufman MD;  [facility];  Service: Cardiovascular;  Laterality: N/A;    TRACHEOSTOMY N/A 8/4/2022    Procedure: CREATION, TRACHEOSTOMY;  Surgeon: Germain Holt MD;  [facility];  Service: General;  Laterality: N/A;    TREATMENT OF CARDIAC ARRHYTHMIA  10/18/2019    Procedure: Cardioversion or Defibrillation;  Surgeon: Raz Wagner MD;  Location: [facility] EP LAB;  Service: Cardiology;;       Review of patient's allergies indicates:   Allergen Reactions    Lisinopril Anaphylaxis    Hydralazine     Hydralazine analogues      Chronic  constipation, impotence, dizziness       Current Facility-Administered Medications   Medication    amiodarone tablet 400 mg    [START ON 2023] levothyroxine tablet 88 mcg    magnesium oxide tablet 400 mg    mexiletine capsule 250 mg    mirtazapine tablet 30 mg    pantoprazole EC tablet 40 mg    sodium chloride 0.9% flush 10 mL    voriconazole tablet 200 mg     Family History       Problem Relation (Age of Onset)    Cancer Sister (54)    Coronary artery disease Father    Diabetes Father    Heart disease Father    Hypertension Father    No Known Problems Mother, Brother          Tobacco Use    Smoking status: Former     Packs/day: 1.00     Years: 31.00     Pack years: 31.00     Types: Cigarettes     Quit date: 2018     Years since quittin.0    Smokeless tobacco: Former    Tobacco comments:     pt is quiting on his own - pt stated not qualified for program;  pt  quit on his own   Substance and Sexual Activity    Alcohol use: No     Alcohol/week: 0.0 standard drinks     Comment: quit    Drug use: No    Sexual activity: Yes     Partners: Female     Birth control/protection: None     Comment: 10/5/17  with same partner 7 years      Review of Systems   Constitutional: Negative.    HENT: Negative.     Respiratory: Negative.     Cardiovascular: Negative.    Gastrointestinal: Negative.    Endocrine: Negative.    Musculoskeletal: Negative.    Hematological: Negative.    Objective:     Vital Signs (Most Recent):  Temp: 98.6 °F (37 °C) (23 1100)  Pulse: 68 (23 1100)  Resp: 20 (23 1100)  BP: (!) 92/0 (23 1100)  SpO2: 98 % (23 1100)   Vital Signs (24h Range):  Temp:  [98.4 °F (36.9 °C)-98.6 °F (37 °C)] 98.6 °F (37 °C)  Pulse:  [68] 68  Resp:  [18-20] 20  SpO2:  [96 %-98 %] 98 %  BP: (86-92)/(0) 92/0     Patient Vitals for the past 72 hrs (Last 3 readings):   Weight   23 1003 96.6 kg (213 lb)     Body mass index is 28.1 kg/m².    No intake or output data in the 24 hours  ending 01/27/23 1305    Physical Exam  Constitutional:       Appearance: Normal appearance.   HENT:      Head: Atraumatic.      Mouth/Throat:      Mouth: Mucous membranes are moist.      Pharynx: Oropharynx is clear.   Cardiovascular:      Rate and Rhythm: Normal rate and regular rhythm.      Pulses: Normal pulses.      Heart sounds: Normal heart sounds.      Comments: VAD hum can be heard, palpable R femoral pulse, no bruit, firm region around R inguinal site  Pulmonary:      Effort: Pulmonary effort is normal.      Breath sounds: Normal breath sounds.   Musculoskeletal:         General: Normal range of motion.      Cervical back: Normal range of motion and neck supple.   Skin:     Capillary Refill: Capillary refill takes 2 to 3 seconds.   Neurological:      General: No focal deficit present.      Mental Status: He is alert and oriented to person, place, and time.       Significant Labs:  CBC:  Recent Labs   Lab 01/27/23  1112   WBC 5.56   RBC 3.21*   HGB 8.3*   HCT 28.8*      MCV 90   MCH 25.9*   MCHC 28.8*     BNP:  Recent Labs   Lab 01/27/23  1112   *     CMP:  Recent Labs   Lab 01/27/23  1112   GLU 79   CALCIUM 8.9   ALBUMIN 2.7*   PROT 7.0      K 4.1   CO2 24      BUN 12   CREATININE 1.4   ALKPHOS 77   ALT 21   AST 30   BILITOT 0.3      Coagulation:   Recent Labs   Lab 01/23/23  1616 01/27/23  1112   INR 2.9* 3.1*     LDH:  No results for input(s): LDH in the last 72 hours.  Microbiology:  Microbiology Results (last 7 days)       ** No results found for the last 168 hours. **            I have reviewed all pertinent labs within the past 24 hours.    Diagnostic Results:  I have reviewed all pertinent imaging results/findings within the past 24 hours.

## 2023-01-27 NOTE — CONSULTS
[facility] - Cardiology Stepdown  Infectious Disease  Consult Note    Patient Name: Tim Richards  MRN: [id number]  Admission Date: 1/26/2023  Hospital Length of Stay: 1 days  Attending Physician: Isaiah Santizo MD  Primary Care Provider: Diego Daniel MD     Isolation Status: No active isolations    Patient information was obtained from patient, past medical records and ER records.      Inpatient consult to Infectious Diseases  Consult performed by: Roxie Garg MD  Consult ordered by: Nic Torres MD        Assessment/Plan:     Pseudoaneurysm of right femoral artery  Afebrile and without leukocytosis. Per discussion with patient and on exam the right groin swelling appears decreased.   · Continue voriconazole 200 mg PO BID.  · Monitor K and MG. Replace as needed.  · Hold all antibiotics at this time.  · Send operative samples for culture.  · Can start meropenem post operatively.  · Re-consult ID after his operative procedure for further recommendations.      Thank you for your consult. I will sign off. Please contact us if you have any additional questions.    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  Mr. Dillon reports he had a fall 4 days prior to the CT being performed and the swelling associated to the fall has now decreased.  He has been adherent and tolerating voriconazole without adverse effect.    Infectious disease is consulted for ongoing voriconazole therapy.             Past Medical History:   Diagnosis Date    A-fib     Anticoagulant long-term use     Atrial flutter 7/30/2022    CHF (congestive heart failure)     Class 1 obesity due to excess calories with serious comorbidity and body mass index (BMI) of 31.0 to 31.9 in adult     Class 1 obesity due to excess calories with serious comorbidity and body mass index (BMI) of 32.0 to 32.9 in adult     Dilated cardiomyopathy 1/10/2018    Disorder of kidney and ureter     CKD    Encounter for blood transfusion     Essential hypertension 8/28/2022    Gout     HTN (hypertension)     Hx of psychiatric care     ICD (implantable cardioverter-defibrillator) infection 7/1/2020    Psychiatric problem     Thyroid disease     Ventricular tachycardia (paroxysmal)        Past Surgical History:   Procedure Laterality Date    AORTIC VALVULOPLASTY N/A 7/13/2022    Procedure: REPAIR, AORTIC VALVE;  Surgeon: Yg Kaufman MD;  [facility];  Service: Cardiovascular;  Laterality: N/A;    APPLICATION OF WOUND VACUUM-ASSISTED CLOSURE DEVICE N/A 7/15/2022    Procedure: APPLICATION, WOUND VAC;  Surgeon: Yg Kaufman MD;  [facility];  Service: Cardiovascular;  Laterality: N/A;  50 x 5 cm     APPLICATION OF WOUND VACUUM-ASSISTED CLOSURE DEVICE Right 9/27/2022    Procedure: APPLICATION, WOUND VAC;  Surgeon: Kole Tabares MD;  Location: [facility] OR [address] [place] [address];  Service: Plastics;  Laterality: Right;    CARDIAC CATHETERIZATION  Dec. 2012    CARDIAC DEFIBRILLATOR PLACEMENT Left     CRRT-D    COLONOSCOPY N/A 3/6/2018    Procedure: COLONOSCOPY;  Surgeon: Alonso Bone MD;  Location: [facility] ([address] [place] [address]);  Service: Endoscopy;  Laterality: N/A;    COLONOSCOPY N/A 7/17/2019    Procedure: COLONOSCOPY;  Surgeon: Blane Valdez MD;  Location: [facility] ENDO ([address] [place] [address]);  Service: Endoscopy;  Laterality: N/A;    COLONOSCOPY N/A 7/18/2019    Procedure: COLONOSCOPY;  Surgeon: Blane Valdez MD;  Location: [facility] ([facility]);  Service: Endoscopy;  Laterality: N/A;    CREATION OF ILIOFEMORAL ARTERY BYPASS Right 9/27/2022    Procedure: CREATION, BYPASS, ARTERIAL, ILIAC TO FEMORAL WITH GRAFT;  Surgeon: Zach Hernández MD;  Location: [facility];  Service: Peripheral Vascular;  Laterality: Right;    CREATION OF MUSCLE ROTATIONAL FLAP Right 9/27/2022    Procedure: CREATION, FLAP, MUSCLE ROTATION, SARTORIUS AND RECTUS FEMORIS;  Surgeon: Kole Tabares MD;  Location: [facility] OR [facility];  Service: Plastics;  Laterality: Right;    ESOPHAGOGASTRODUODENOSCOPY N/A 7/17/2019    Procedure: EGD (ESOPHAGOGASTRODUODENOSCOPY);  Surgeon: Blane Valdez MD;  Location: [facility] ENDO (2ND FLR);  Service: Endoscopy;  Laterality: N/A;    ESOPHAGOGASTRODUODENOSCOPY N/A 7/18/2019    Procedure: EGD (ESOPHAGOGASTRODUODENOSCOPY);  Surgeon: Blane Valdez MD;  Location: [facility] ENDO (2ND FLR);  Service: Endoscopy;  Laterality: N/A;    FOREIGN BODY REMOVAL N/A 7/22/2022    Procedure: REMOVAL, FOREIGN BODY;  Surgeon: Yg Kaufman MD;  Location: [facility];  Service: Cardiovascular;  Laterality: N/A;  LVAD Heartmate 2 drive line removal    INSERTION OF GRAFT TO PERICARDIUM N/A 7/15/2022    Procedure: INSERTION, GRAFT, PERICARDIUM;  Surgeon: Yg Kaufman MD;  [facility];  Service: Cardiovascular;  Laterality: N/A;    IRRIGATION OF MEDIASTINUM N/A 7/15/2022    Procedure: IRRIGATION, MEDIASTINUM;  Surgeon: Yg Kaufman MD;  [facility];  Service: Cardiovascular;  Laterality: N/A;    LYSIS OF ADHESIONS  7/13/2022    Procedure: LYSIS, ADHESIONS;  Surgeon: Yg Kaufman MD;  [facility];  Service: Cardiovascular;;    NONINVASIVE CARDIAC ELECTROPHYSIOLOGY STUDY N/A 10/18/2019    Procedure: CARDIAC ELECTROPHYSIOLOGY STUDY, NONINVASIVE;  Surgeon: Raz Wagner MD;  Location: [facility] EP LAB;  Service: Cardiology;  Laterality: N/A;  VT, DFTs, MDT CRTD in situ, LVAD, anes, MB, 8206    REPLACEMENT OF  IMPLANTABLE CARDIOVERTER-DEFIBRILLATOR (ICD) GENERATOR N/A 3/9/2020    Procedure: REPLACEMENT, ICD GENERATOR;  Surgeon: Harry Yun MD;  Location: [facility] EP LAB;  Service: Cardiology;  Laterality: N/A;  VT, ICD Gen Change and Lead Revision, MDT, MAC, DM,3 Prep    REPLACEMENT OF LEFT VENTRICULAR ASSIST DEVICE (LVAD)  7/13/2022    Procedure: REPLACEMENT, LVAD;  Surgeon: Yg Kaufman MD;  [address]  Service: Cardiovascular;;    REPLACEMENT OF PUMP N/A 7/13/2022    Procedure: REPLACEMENT, PUMP;  Surgeon: Yg Kaufman MD;  [facility];  Service: Cardiovascular;  Laterality: N/A;  LVAD pump exchange  EXPLANATION OF HEATMATE 2  IMPLANTATION OF HEARTMATE 3  IMPLANTATION OF 8MM CHIMNEY GRAFT TO RFA  INITIATION OF ECMO  TEMPORARY CLOSURE OF CHEST    REVISION OF IMPLANTABLE CARDIOVERTER-DEFIBRILLATOR (ICD) ELECTRODE LEAD PLACEMENT N/A 3/9/2020    Procedure: REVISION, INSERTION, ELECTRODE LEAD, ICD;  Surgeon: Harry Yun MD;  Location: [facility] EP LAB;  Service: Cardiology;  Laterality: N/A;  VT, ICD Gen Change and Lead Revision, MDT, MAC, DM,3 Prep    STERNAL WOUND CLOSURE N/A 7/14/2022    Procedure: CLOSURE, WOUND, STERNUM;  Surgeon: Yg Kaufman MD;  [facility];  Service: Cardiovascular;  Laterality: N/A;  temporary closure  evacuation of hematoma    STERNAL WOUND CLOSURE N/A 7/15/2022    Procedure: CLOSURE, WOUND, STERNUM;  Surgeon: Yg Kaufman MD;  Location: [facility] OR [address] [place] [address];  Service: Cardiovascular;  Laterality: N/A;    TRACHEOSTOMY N/A 8/4/2022    Procedure: CREATION, TRACHEOSTOMY;  Surgeon: Germain Holt MD;  Location: [facility] OR [address] [place] [address];  Service: General;  Laterality: N/A;    TREATMENT OF CARDIAC ARRHYTHMIA  10/18/2019    Procedure: Cardioversion or Defibrillation;  Surgeon: Raz Wagner MD;  Location: [facility] EP LAB;  Service: Cardiology;;       Review of patient's allergies indicates:   Allergen Reactions    Lisinopril Anaphylaxis     Hydralazine     Hydralazine analogues      Chronic constipation, impotence, dizziness       Medications:  Medications Prior to Admission   Medication Sig    ALPRAZolam (XANAX) 1 MG tablet Take 0.5-1 tablets (0.5-1 mg total) by mouth 2 (two) times daily as needed for Anxiety.    amiodarone (PACERONE) 200 MG Tab Take 2 tablets (400 mg total) by mouth once daily.    ferrous gluconate (FERGON) 324 MG tablet Take 1 tablet (324 mg total) by mouth 2 (two) times daily with meals.    levothyroxine (SYNTHROID) 88 MCG tablet Take 1 tablet (88 mcg total) by mouth before breakfast.    magnesium oxide (MAG-OX) 400 mg (241.3 mg magnesium) tablet Take 1 tablet (400 mg total) by mouth 2 (two) times daily.    mexiletine (MEXITIL) 250 MG Cap Take 1 capsule (250 mg total) by mouth every 8 (eight) hours.    mirtazapine (REMERON) 30 MG tablet Take 1 tablet (30 mg total) by mouth every evening.    omega-3 acid ethyl esters (LOVAZA) 1 gram capsule Take 2 capsules (2 g total) by mouth 2 (two) times daily.    pantoprazole (PROTONIX) 40 MG tablet Take 1 tablet (40 mg total) by mouth daily with lunch.    voriconazole (VFEND) 200 MG Tab Take 1 tablet (200 mg total) by mouth 2 (two) times daily.    warfarin (COUMADIN) 5 MG tablet Take a half tablet (2.5mg) by mouth on 10/5 only. Then take 1 tablet (5mg) daily    sodium chloride 0.9% SolP 100 mL with ertapenem 1 gram SolR 1 g Inject 1 g into the vein once daily.     Antibiotics (From admission, onward)      None          Antifungals (From admission, onward)      Start     Stop Route Frequency Ordered    01/27/23 1000  voriconazole tablet 200 mg         -- Oral 2 times daily 01/27/23 0859          Antivirals (From admission, onward)      None             Immunization History   Administered Date(s) Administered    COVID-19, MRNA, LN-S, PF (MODERNA FULL 0.5 ML DOSE) 03/12/2021, 04/09/2021    COVID-19, MRNA, LN-S, PF (Pfizer) (Purple Cap) 12/04/2021    Hepatitis A / Hepatitis B  2018    Influenza 2014, 2019    Influenza - Quadrivalent - PF *Preferred* (6 months and older) 2015, 2016, 10/05/2017, 2021    Influenza - Trivalent - PF (PED) 2014    Pneumococcal Conjugate - 13 Valent 2014    Pneumococcal Polysaccharide - 23 Valent 10/01/2015, 2016    Tdap 2018       Family History       Problem Relation (Age of Onset)    Cancer Sister (54)    Coronary artery disease Father    Diabetes Father    Heart disease Father    Hypertension Father    No Known Problems Mother, Brother          Social History     Socioeconomic History    Marital status:     Number of children: 3   Occupational History     Employer: [employer]    Tobacco Use    Smoking status: Former     Packs/day: 1.00     Years: 31.00     Pack years: 31.00     Types: Cigarettes     Quit date: 2018     Years since quittin.0    Smokeless tobacco: Former    Tobacco comments:     pt is quiting on his own - pt stated not qualified for program;  pt  quit on his own   Substance and Sexual Activity    Alcohol use: No     Alcohol/week: 0.0 standard drinks     Comment: quit    Drug use: No    Sexual activity: Yes     Partners: Female     Birth control/protection: None     Comment: 10/5/17  with same partner 7 years    Social History Narrative    Disabled     Social Determinants of Health     Financial Resource Strain: Low Risk     Difficulty of Paying Living Expenses: Not very hard   Food Insecurity: Unknown    Worried About Running Out of Food in the Last Year: Patient refused    Ran Out of Food in the Last Year: Patient refused   Transportation Needs: Unknown    Lack of Transportation (Medical): Patient refused    Lack of Transportation (Non-Medical): Patient refused   Physical Activity: Insufficiently Active    Days of Exercise per Week: 3 days    Minutes of Exercise per Session: 10 min   Stress: Stress Concern Present    Feeling of  Stress : To some extent   Social Connections: Unknown    Frequency of Communication with Friends and Family: Patient refused    Frequency of Social Gatherings with Friends and Family: Patient refused    Active Member of Clubs or Organizations: No    Attends Club or Organization Meetings: Patient refused    Marital Status:    Housing Stability: Unknown    Unable to Pay for Housing in the Last Year: Patient refused    Unstable Housing in the Last Year: Patient refused     Review of Systems   Constitutional:  Negative for chills, fatigue and fever.   HENT:  Negative for ear pain, mouth sores, nosebleeds, postnasal drip, rhinorrhea, sinus pressure, sore throat, tinnitus, trouble swallowing and voice change.    Eyes:  Negative for photophobia, pain, redness and visual disturbance.   Respiratory:  Negative for apnea, cough, chest tightness, shortness of breath and wheezing.    Cardiovascular:  Negative for chest pain, palpitations and leg swelling.   Gastrointestinal:  Negative for abdominal pain, blood in stool, constipation, diarrhea, nausea and vomiting.   Endocrine: Negative for cold intolerance, heat intolerance, polydipsia and polyuria.   Genitourinary:  Negative for decreased urine volume, difficulty urinating, dysuria, flank pain, frequency, genital sores, hematuria, penile discharge, penile pain, penile swelling, scrotal swelling, testicular pain and urgency.   Musculoskeletal:  Negative for arthralgias, back pain, joint swelling, myalgias and neck pain.   Skin:  Negative for color change and rash.   Allergic/Immunologic: Negative for environmental allergies and food allergies.   Neurological:  Negative for dizziness, seizures, syncope, weakness, light-headedness, numbness and headaches.        Falls.   Hematological:  Negative for adenopathy. Does not bruise/bleed easily.   Psychiatric/Behavioral:  Negative for agitation, confusion, decreased concentration, hallucinations, self-injury, sleep  disturbance and suicidal ideas. The patient is not nervous/anxious.    Objective:     Vital Signs (Most Recent):  Temp: 98.6 °F (37 °C) (01/27/23 1100)  Pulse: 66 (01/27/23 1436)  Resp: 20 (01/27/23 1100)  BP: (!) 92/0 (01/27/23 1100)  SpO2: 98 % (01/27/23 1100)   Vital Signs (24h Range):  Temp:  [98.4 °F (36.9 °C)-98.6 °F (37 °C)] 98.6 °F (37 °C)  Pulse:  [66-68] 66  Resp:  [18-20] 20  SpO2:  [96 %-98 %] 98 %  BP: (86-92)/(0) 92/0     Weight: 96.6 kg (213 lb)  Body mass index is 28.1 kg/m².    Estimated Creatinine Clearance: 72.2 mL/min (based on SCr of 1.4 mg/dL).    Physical Exam  Vitals and nursing note reviewed. Exam conducted with a chaperone present.   Constitutional:       Appearance: Normal appearance. He is ill-appearing.      Interventions: He is sedated and intubated.   HENT:      Head: Normocephalic and atraumatic.   Eyes:      General: No scleral icterus.        Right eye: No discharge.         Left eye: No discharge.      Conjunctiva/sclera: Conjunctivae normal.   Cardiovascular:      Rate and Rhythm: Normal rate and regular rhythm.      Pulses: Normal pulses.      Heart sounds: No murmur heard.     Comments: VAD Humming sounds  Pulmonary:      Effort: Pulmonary effort is normal. No respiratory distress. He is intubated.      Breath sounds: Normal breath sounds. No stridor. No wheezing or rhonchi.   Abdominal:      General: There is no distension.      Palpations: Abdomen is soft.      Tenderness: There is no abdominal tenderness.      Comments: DLES covered.   Musculoskeletal:         General: Normal range of motion.      Comments: Right groin wound covered with gauze dressing minimally saturated with blood. Mild swelling of the right groin. Appears decreased on examination.   Skin:     General: Skin is warm and dry.   Neurological:      General: No focal deficit present.      Mental Status: He is lethargic.       Significant Labs: Blood Culture:   Recent Labs   Lab 08/09/22  1102 09/25/22  0133  09/25/22  0138 12/02/22  1631 12/02/22  1632   LABBLOO No growth after 5 days. No growth after 5 days. No growth after 5 days. No growth after 5 days. No growth after 5 days.     BMP:   Recent Labs   Lab 01/27/23  1112   GLU 79      K 4.1      CO2 24   BUN 12   CREATININE 1.4   CALCIUM 8.9     CBC:   Recent Labs   Lab 01/27/23  1112   WBC 5.56   HGB 8.3*   HCT 28.8*        Wound Culture:   Recent Labs   Lab 08/09/22  1347 09/25/22  0815 09/27/22  0944 12/01/22  1015 12/03/22  1209   LABAERO No growth ESCHERICHIA COLI ESBL  Few  * ESCHERICHIA COLI ESBL  Moderate  *  PROTEUS MIRABILIS  Rare  *  ESCHERICHIA COLI  Rare  *  ESCHERICHIA COLI ESBL  Rare  *  CITROBACTER FARMERI  Rare  * No growth No growth       Significant Imaging: I have reviewed all pertinent imaging results/findings within the past 24 hours.

## 2023-01-27 NOTE — ASSESSMENT & PLAN NOTE
-Va ECMO decannulated on 7/19/22  -History of cultures positive for ESBL ecoli, ecoli, proteus mirabilis, and aspergillus flavus (see micro tab)  -Was previously on ertapenem and voriconazole, ertapenem stopped on 1/6/23.  Remains on voriconazole  -CT abdomen obtained for evaluation of ventral hernia but incidentally noted mass in R groin concerning for hematoma as well as inferolateral mass for lymphocele vs seroma  -low suspicion for infection at this time, consulted CT surgery for evaluation.  Appreciate recommendations

## 2023-01-27 NOTE — ASSESSMENT & PLAN NOTE
-Va ECMO decannulated on 7/19/22  -History of cultures positive for ESBL ecoli, ecoli, proteus mirabilis, and aspergillus flavus (see micro tab)  -Was previously on ertapenem and voriconazole, ertapenem stopped on 1/6/23.  Remains on voriconazole  -CT abdomen obtained for evaluation of ventral hernia but incidentally noted mass in R groin concerning for hematoma as well as inferolateral mass for lymphocele vs seroma  -low suspicion for infection at this time, consulted vascular surgery for evaluation.   -recommendation by vascular surgery for no inpatient surgical intervention, recommended plastic surgery evaluation

## 2023-01-27 NOTE — PROGRESS NOTES
01/27/2023  Estephania Martinez    Current provider:  Isaiah Santizo MD    Device interrogation:  TXP LVAD INTERROGATIONS 1/27/2023 1/12/2023 12/3/2022 12/3/2022 12/3/2022 12/2/2022 12/2/2022   Type HeartMate3 - HeartMate3 HeartMate3 HeartMate3 HeartMate3 HeartMate3   Flow 5.6 - 5.5 5.5 5.6 5.7 5.6   Speed 6400 - 6400 6400 6400 6400 6400   PI 1.5 - 2.5 2.0 1.7 1.6 1.4   Power ([place]) 5.6 - 5.5 5.6 5.6 5.5 5.6   LSL 6000 - 6000 6000 6000 6000 6000   Low Flow Alarm - - - - - - -   High Power Alarm - - - - - - -   Pulsatility - No Pulse No Pulse No Pulse - - -          Rounded on Tim Richards to ensure all mechanical assist device settings (IABP or VAD) were appropriate and all parameters were within limits.  I was able to ensure all back up equipment was present, the staff had no issues, and the Perfusion Department daily rounding was complete.      For implantable VADs: Interrogation of Ventricular assist device was performed with analysis of device parameters and review of device function. I have personally reviewed the interrogation findings and agree with findings as stated.     In emergency, the nursing units have been notified to contact the perfusion department either by:  Calling [id number] from 630am to 4pm Mon thru Fri, utilizing the On-Call Finder functionality of Epic and searching for Perfusion, or by contacting the hospital  from 4pm to 630am and on weekends and asking to speak with the perfusionist on call.    11:52 AM

## 2023-01-27 NOTE — PLAN OF CARE
Plan of care discussed with patient. Patient AOX4 and VS stable. Patient ambulating independently, fall precautions in place. LVAD DP and numbers WNL, smooth LVAD hum. Patient has no complaints of pain. Discussed medications and care. Patient has no questions at this time. Will continue to monitor.

## 2023-01-27 NOTE — HPI
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  Mr. Dillon reports he had a fall 4 days prior to the CT being performed and the swelling associated to the fall has now decreased.  He has been adherent and tolerating voriconazole without adverse effect.    Infectious disease is consulted for ongoing voriconazole therapy.

## 2023-01-27 NOTE — H&P
[facility] - Cardiology Stepdown  Heart Transplant  H&P    Patient Name: Tim Richards  MRN: [id number]  Admission Date: 1/26/2023  Attending Physician: Isaiah Santizo MD  Primary Care Provider: Diego Daniel MD  Principal Problem:<principal problem not specified>    Subjective:     History of Present Illness:  Mr. 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  New growth of aspergillus flavus post discharge from his surgical site (10/31/22), was discharged with plan for ertapenem and voriconazole.  Ertapenem stopped on 1/6/23 and remained on the voriconazole.      He is seen by plastics and CTS who had no plans for follow up.  He received a routine CT abdomen to evaluate a ventral hernia which incidentally showed a mixed collection in the R inguinal region (10 cm x 6.1 cm) concerning for hematoma, also another simple fluid collection in the inferolateral aspect measuring 3.3 x 3.8 cm favoring seroma vs lymphocele.  He was advised to come into the hospital due to these findings.      Upon arrival he was feeling well, had no complaints of shortness of breath, chest pain, fevers, dizziness, lightheadedness, palpitations.  Denied any alarms on his LVAD.  When asked about his swelling he reports that he had a mechanical fall about 3 weeks ago and since that time the swelling has gone down.  He overall is in his usual state of health without any complaints.  Vitals were stable, slightly hypertensive on doppler to 92/0.  Labs showed BNP of 521, stable  renal function with BUN/Cr ratio of 12/1.4, INR of 3.1.  CRP of 69.  He was admitted to [facility] with concern for R groin hematoma, CT surgery was consulted for evaluation.      No new subjective & objective note has been filed under this hospital service since the last note was generated.    Assessment/Plan:     Pseudoaneurysm of right femoral artery  -Va ECMO decannulated on 7/19/22  -History of cultures positive for ESBL ecoli, ecoli, proteus mirabilis, and aspergillus flavus (see micro tab)  -Was previously on ertapenem and voriconazole, ertapenem stopped on 1/6/23.  Remains on voriconazole  -CT abdomen obtained for evaluation of ventral hernia but incidentally noted mass in R groin concerning for hematoma as well as inferolateral mass for lymphocele vs seroma  -low suspicion for infection at this time, consulted vascular surgery for evaluation.  Appreciate recommendations      VT (ventricular tachycardia)  Continue amiodarone 400mg daily for VT prophylaxis  ICD present    LVAD (left ventricular assist device) present  Procedure: Device Interrogation Including analysis of device parameters  Current Settings: Ventricular Assist Device  Review of device function is stable  Goal INR of 2.0-2.5, INR currently 3.1.  Pharmacy to assist with management, will hold coumadin tonight    TXP LVAD INTERROGATIONS 1/27/2023 1/27/2023 1/12/2023 12/3/2022 12/3/2022 12/3/2022 12/2/2022   Type HeartMate3 HeartMate3 - HeartMate3 HeartMate3 HeartMate3 HeartMate3   Flow 5.5 5.6 - 5.5 5.5 5.6 5.7   Speed 6400 6400 - 6400 6400 6400 6400   PI 2.0 1.5 - 2.5 2.0 1.7 1.6   Power (Jackson) 5.6 5.6 - 5.5 5.6 5.6 5.5   LSL 6000 6000 - 6000 6000 6000 6000   Low Flow Alarm - - - - - - -   High Power Alarm - - - - - - -   Pulsatility - - No Pulse No Pulse No Pulse - -       Hypertension  Not currently on any antihypertensives  Will maintain MAP beetween 65-85        Nic Torres MD  Heart Transplant  [facility] - Cardiology Stepdown

## 2023-01-27 NOTE — SUBJECTIVE & OBJECTIVE
Past Medical History:   Diagnosis Date    A-fib     Anticoagulant long-term use     Atrial flutter 7/30/2022    CHF (congestive heart failure)     Class 1 obesity due to excess calories with serious comorbidity and body mass index (BMI) of 31.0 to 31.9 in adult     Class 1 obesity due to excess calories with serious comorbidity and body mass index (BMI) of 32.0 to 32.9 in adult     Dilated cardiomyopathy 1/10/2018    Disorder of kidney and ureter     CKD    Encounter for blood transfusion     Essential hypertension 8/28/2022    Gout     HTN (hypertension)     Hx of psychiatric care     ICD (implantable cardioverter-defibrillator) infection 7/1/2020    Psychiatric problem     Thyroid disease     Ventricular tachycardia (paroxysmal)        Past Surgical History:   Procedure Laterality Date    AORTIC VALVULOPLASTY N/A 7/13/2022    Procedure: REPAIR, AORTIC VALVE;  Surgeon: Yg Kaufman MD;  Location: [facility] OR [address] [place] [address];  Service: Cardiovascular;  Laterality: N/A;    APPLICATION OF WOUND VACUUM-ASSISTED CLOSURE DEVICE N/A 7/15/2022    Procedure: APPLICATION, WOUND VAC;  Surgeon: Yg Kaufman MD;  [facility];  Service: Cardiovascular;  Laterality: N/A;  50 x 5 cm     APPLICATION OF WOUND VACUUM-ASSISTED CLOSURE DEVICE Right 9/27/2022    Procedure: APPLICATION, WOUND VAC;  Surgeon: Kole Tabares MD;  Location: [facility] OR [address] [place] [address];  Service: Plastics;  Laterality: Right;    CARDIAC CATHETERIZATION  Dec. 2012    CARDIAC DEFIBRILLATOR PLACEMENT Left     CRRT-D    COLONOSCOPY N/A 3/6/2018    Procedure: COLONOSCOPY;  Surgeon: Alonso Bone MD;  Location: [facility] ([address] [place] [address]);  Service: Endoscopy;  Laterality: N/A;    COLONOSCOPY N/A 7/17/2019    Procedure: COLONOSCOPY;  Surgeon: Blane Valdez MD;  [facility]  Service: Endoscopy;  Laterality: N/A;    COLONOSCOPY N/A 7/18/2019    Procedure: COLONOSCOPY;  Surgeon: Blane Valdez MD;  Location: [facility] ENDO ([address] [place] [address]);  Service: Endoscopy;   Laterality: N/A;    CREATION OF ILIOFEMORAL ARTERY BYPASS Right 9/27/2022    Procedure: CREATION, BYPASS, ARTERIAL, ILIAC TO FEMORAL WITH GRAFT;  Surgeon: Zach Hernández MD;  Location: [facility] OR [facility] FLR;  Service: Peripheral Vascular;  Laterality: Right;    CREATION OF MUSCLE ROTATIONAL FLAP Right 9/27/2022    Procedure: CREATION, FLAP, MUSCLE ROTATION, SARTORIUS AND RECTUS FEMORIS;  Surgeon: Kole Tabares MD;  Location: [facility] OR [facility] FLR;  Service: Plastics;  Laterality: Right;    ESOPHAGOGASTRODUODENOSCOPY N/A 7/17/2019    Procedure: EGD (ESOPHAGOGASTRODUODENOSCOPY);  Surgeon: Blane Valdez MD;  Location: [facility] ENDO (2ND FLR);  Service: Endoscopy;  Laterality: N/A;    ESOPHAGOGASTRODUODENOSCOPY N/A 7/18/2019    Procedure: EGD (ESOPHAGOGASTRODUODENOSCOPY);  Surgeon: Blane Valdez MD;  Location: [facility] ENDO (2ND FLR);  Service: Endoscopy;  Laterality: N/A;    FOREIGN BODY REMOVAL N/A 7/22/2022    Procedure: REMOVAL, FOREIGN BODY;  Surgeon: Yg Kaufman MD;  Location: [facility] OR [facility] FLR;  Service: Cardiovascular;  Laterality: N/A;  LVAD Heartmate 2 drive line removal    INSERTION OF GRAFT TO PERICARDIUM N/A 7/15/2022    Procedure: INSERTION, GRAFT, PERICARDIUM;  Surgeon: Yg Kaufman MD;  [facility];  Service: Cardiovascular;  Laterality: N/A;    IRRIGATION OF MEDIASTINUM N/A 7/15/2022    Procedure: IRRIGATION, MEDIASTINUM;  Surgeon: Yg Kaufman MD;  [facility];  Service: Cardiovascular;  Laterality: N/A;    LYSIS OF ADHESIONS  7/13/2022    Procedure: LYSIS, ADHESIONS;  Surgeon: Yg Kaufman MD;  Location: [facility] OR [facility] FLR;  Service: Cardiovascular;;    NONINVASIVE CARDIAC ELECTROPHYSIOLOGY STUDY N/A 10/18/2019    Procedure: CARDIAC ELECTROPHYSIOLOGY STUDY, NONINVASIVE;  Surgeon: Raz Wagner MD;  Location: [facility] EP LAB;  Service: Cardiology;  Laterality: N/A;  VT, DFTs, MDT CRTD in situ, LVAD, anes, MB, 3098    REPLACEMENT OF IMPLANTABLE CARDIOVERTER-DEFIBRILLATOR (ICD)  GENERATOR N/A 3/9/2020    Procedure: REPLACEMENT, ICD GENERATOR;  Surgeon: Harry Yun MD;  Location: [facility] EP LAB;  Service: Cardiology;  Laterality: N/A;  VT, ICD Gen Change and Lead Revision, MDT, MAC, DM,3 Prep    REPLACEMENT OF LEFT VENTRICULAR ASSIST DEVICE (LVAD)  7/13/2022    Procedure: REPLACEMENT, LVAD;  Surgeon: Yg Kaufman MD;  [facility];  Service: Cardiovascular;;    REPLACEMENT OF PUMP N/A 7/13/2022    Procedure: REPLACEMENT, PUMP;  Surgeon: Yg Kaufman MD;  [facility];  Service: Cardiovascular;  Laterality: N/A;  LVAD pump exchange  EXPLANATION OF HEATMATE 2  IMPLANTATION OF HEARTMATE 3  IMPLANTATION OF 8MM CHIMNEY GRAFT TO RFA  INITIATION OF ECMO  TEMPORARY CLOSURE OF CHEST    REVISION OF IMPLANTABLE CARDIOVERTER-DEFIBRILLATOR (ICD) ELECTRODE LEAD PLACEMENT N/A 3/9/2020    Procedure: REVISION, INSERTION, ELECTRODE LEAD, ICD;  Surgeon: Harry Yun MD;  Location: [facility] EP LAB;  Service: Cardiology;  Laterality: N/A;  VT, ICD Gen Change and Lead Revision, MDT, MAC, DM,3 Prep    STERNAL WOUND CLOSURE N/A 7/14/2022    Procedure: CLOSURE, WOUND, STERNUM;  Surgeon: Yg Kaufman MD;  [facility];  Service: Cardiovascular;  Laterality: N/A;  temporary closure  evacuation of hematoma    STERNAL WOUND CLOSURE N/A 7/15/2022    Procedure: CLOSURE, WOUND, STERNUM;  Surgeon: Yg Kaufman MD;  [facility];  Service: Cardiovascular;  Laterality: N/A;    TRACHEOSTOMY N/A 8/4/2022    Procedure: CREATION, TRACHEOSTOMY;  Surgeon: Germain Holt MD;  [facility];  Service: General;  Laterality: N/A;    TREATMENT OF CARDIAC ARRHYTHMIA  10/18/2019    Procedure: Cardioversion or Defibrillation;  Surgeon: Raz Wagner MD;  Location: [facility] EP LAB;  Service: Cardiology;;       Review of patient's allergies indicates:   Allergen Reactions    Lisinopril Anaphylaxis    Hydralazine     Hydralazine analogues      Chronic  constipation, impotence, dizziness       Medications:  Medications Prior to Admission   Medication Sig    ALPRAZolam (XANAX) 1 MG tablet Take 0.5-1 tablets (0.5-1 mg total) by mouth 2 (two) times daily as needed for Anxiety.    amiodarone (PACERONE) 200 MG Tab Take 2 tablets (400 mg total) by mouth once daily.    ferrous gluconate (FERGON) 324 MG tablet Take 1 tablet (324 mg total) by mouth 2 (two) times daily with meals.    levothyroxine (SYNTHROID) 88 MCG tablet Take 1 tablet (88 mcg total) by mouth before breakfast.    magnesium oxide (MAG-OX) 400 mg (241.3 mg magnesium) tablet Take 1 tablet (400 mg total) by mouth 2 (two) times daily.    mexiletine (MEXITIL) 250 MG Cap Take 1 capsule (250 mg total) by mouth every 8 (eight) hours.    mirtazapine (REMERON) 30 MG tablet Take 1 tablet (30 mg total) by mouth every evening.    omega-3 acid ethyl esters (LOVAZA) 1 gram capsule Take 2 capsules (2 g total) by mouth 2 (two) times daily.    pantoprazole (PROTONIX) 40 MG tablet Take 1 tablet (40 mg total) by mouth daily with lunch.    voriconazole (VFEND) 200 MG Tab Take 1 tablet (200 mg total) by mouth 2 (two) times daily.    warfarin (COUMADIN) 5 MG tablet Take a half tablet (2.5mg) by mouth on 10/5 only. Then take 1 tablet (5mg) daily    sodium chloride 0.9% SolP 100 mL with ertapenem 1 gram SolR 1 g Inject 1 g into the vein once daily.     Antibiotics (From admission, onward)      None          Antifungals (From admission, onward)      Start     Stop Route Frequency Ordered    01/27/23 1000  voriconazole tablet 200 mg         -- Oral 2 times daily 01/27/23 0859          Antivirals (From admission, onward)      None             Immunization History   Administered Date(s) Administered    COVID-19, MRNA, LN-S, PF (MODERNA FULL 0.5 ML DOSE) 03/12/2021, 04/09/2021    COVID-19, MRNA, LN-S, PF (Pfizer) (Purple Cap) 12/04/2021    Hepatitis A / Hepatitis B 02/23/2018    Influenza 11/01/2014, 08/17/2019    Influenza -  Quadrivalent - PF *Preferred* (6 months and older) 2015, 2016, 10/05/2017, 2021    Influenza - Trivalent - PF (PED) 2014    Pneumococcal Conjugate - 13 Valent 2014    Pneumococcal Polysaccharide - 23 Valent 10/01/2015, 2016    Tdap 2018       Family History       Problem Relation (Age of Onset)    Cancer Sister (54)    Coronary artery disease Father    Diabetes Father    Heart disease Father    Hypertension Father    No Known Problems Mother, Brother          Social History     Socioeconomic History    Marital status:     Number of children: 3   Occupational History     Employer: [employer]    Tobacco Use    Smoking status: Former     Packs/day: 1.00     Years: 31.00     Pack years: 31.00     Types: Cigarettes     Quit date: 2018     Years since quittin.0    Smokeless tobacco: Former    Tobacco comments:     pt is quiting on his own - pt stated not qualified for program;  pt  quit on his own   Substance and Sexual Activity    Alcohol use: No     Alcohol/week: 0.0 standard drinks     Comment: quit    Drug use: No    Sexual activity: Yes     Partners: Female     Birth control/protection: None     Comment: 10/5/17  with same partner 7 years    Social History Narrative    Disabled     Social Determinants of Health     Financial Resource Strain: Low Risk     Difficulty of Paying Living Expenses: Not very hard   Food Insecurity: Unknown    Worried About Running Out of Food in the Last Year: Patient refused    Ran Out of Food in the Last Year: Patient refused   Transportation Needs: Unknown    Lack of Transportation (Medical): Patient refused    Lack of Transportation (Non-Medical): Patient refused   Physical Activity: Insufficiently Active    Days of Exercise per Week: 3 days    Minutes of Exercise per Session: 10 min   Stress: Stress Concern Present    Feeling of Stress : To some extent   Social Connections: Unknown    Frequency of Communication  with Friends and Family: Patient refused    Frequency of Social Gatherings with Friends and Family: Patient refused    Active Member of Clubs or Organizations: No    Attends Club or Organization Meetings: Patient refused    Marital Status:    Housing Stability: Unknown    Unable to Pay for Housing in the Last Year: Patient refused    Unstable Housing in the Last Year: Patient refused     Review of Systems   Constitutional:  Negative for chills, fatigue and fever.   HENT:  Negative for ear pain, mouth sores, nosebleeds, postnasal drip, rhinorrhea, sinus pressure, sore throat, tinnitus, trouble swallowing and voice change.    Eyes:  Negative for photophobia, pain, redness and visual disturbance.   Respiratory:  Negative for apnea, cough, chest tightness, shortness of breath and wheezing.    Cardiovascular:  Negative for chest pain, palpitations and leg swelling.   Gastrointestinal:  Negative for abdominal pain, blood in stool, constipation, diarrhea, nausea and vomiting.   Endocrine: Negative for cold intolerance, heat intolerance, polydipsia and polyuria.   Genitourinary:  Negative for decreased urine volume, difficulty urinating, dysuria, flank pain, frequency, genital sores, hematuria, penile discharge, penile pain, penile swelling, scrotal swelling, testicular pain and urgency.   Musculoskeletal:  Negative for arthralgias, back pain, joint swelling, myalgias and neck pain.   Skin:  Negative for color change and rash.   Allergic/Immunologic: Negative for environmental allergies and food allergies.   Neurological:  Negative for dizziness, seizures, syncope, weakness, light-headedness, numbness and headaches.        Falls.   Hematological:  Negative for adenopathy. Does not bruise/bleed easily.   Psychiatric/Behavioral:  Negative for agitation, confusion, decreased concentration, hallucinations, self-injury, sleep disturbance and suicidal ideas. The patient is not nervous/anxious.    Objective:     Vital Signs  (Most Recent):  Temp: 98.6 °F (37 °C) (01/27/23 1100)  Pulse: 66 (01/27/23 1436)  Resp: 20 (01/27/23 1100)  BP: (!) 92/0 (01/27/23 1100)  SpO2: 98 % (01/27/23 1100)   Vital Signs (24h Range):  Temp:  [98.4 °F (36.9 °C)-98.6 °F (37 °C)] 98.6 °F (37 °C)  Pulse:  [66-68] 66  Resp:  [18-20] 20  SpO2:  [96 %-98 %] 98 %  BP: (86-92)/(0) 92/0     Weight: 96.6 kg (213 lb)  Body mass index is 28.1 kg/m².    Estimated Creatinine Clearance: 72.2 mL/min (based on SCr of 1.4 mg/dL).    Physical Exam  Vitals and nursing note reviewed. Exam conducted with a chaperone present.   Constitutional:       Appearance: Normal appearance. He is ill-appearing.      Interventions: He is sedated and intubated.   HENT:      Head: Normocephalic and atraumatic.   Eyes:      General: No scleral icterus.        Right eye: No discharge.         Left eye: No discharge.      Conjunctiva/sclera: Conjunctivae normal.   Cardiovascular:      Rate and Rhythm: Normal rate and regular rhythm.      Pulses: Normal pulses.      Heart sounds: No murmur heard.     Comments: VAD Humming sounds  Pulmonary:      Effort: Pulmonary effort is normal. No respiratory distress. He is intubated.      Breath sounds: Normal breath sounds. No stridor. No wheezing or rhonchi.   Abdominal:      General: There is no distension.      Palpations: Abdomen is soft.      Tenderness: There is no abdominal tenderness.      Comments: DLES covered.   Musculoskeletal:         General: Normal range of motion.      Comments: Right groin wound covered with gauze dressing minimally saturated with blood. Mild swelling of the right groin. Appears decreased on examination.   Skin:     General: Skin is warm and dry.   Neurological:      General: No focal deficit present.      Mental Status: He is lethargic.       Significant Labs: Blood Culture:   Recent Labs   Lab 08/09/22  1102 09/25/22  0133 09/25/22  0138 12/02/22  1631 12/02/22  1632   LABBLOO No growth after 5 days. No growth after 5 days.  No growth after 5 days. No growth after 5 days. No growth after 5 days.     BMP:   Recent Labs   Lab 01/27/23  1112   GLU 79      K 4.1      CO2 24   BUN 12   CREATININE 1.4   CALCIUM 8.9     CBC:   Recent Labs   Lab 01/27/23  1112   WBC 5.56   HGB 8.3*   HCT 28.8*        Wound Culture:   Recent Labs   Lab 08/09/22  1347 09/25/22  0815 09/27/22  0944 12/01/22  1015 12/03/22  1209   LABAERO No growth ESCHERICHIA COLI ESBL  Few  * ESCHERICHIA COLI ESBL  Moderate  *  PROTEUS MIRABILIS  Rare  *  ESCHERICHIA COLI  Rare  *  ESCHERICHIA COLI ESBL  Rare  *  CITROBACTER FARMERI  Rare  * No growth No growth       Significant Imaging: I have reviewed all pertinent imaging results/findings within the past 24 hours.

## 2023-01-27 NOTE — PROGRESS NOTES
01/27/23 1602   Vital Signs   BP (!) 100/0   BP Location Right arm   BP Method Doppler   Patient Position Lying     MD notified. PO amlodipine ordered to give.

## 2023-01-27 NOTE — ASSESSMENT & PLAN NOTE
Afebrile and without leukocytosis. Per discussion with patient and on exam the right groin swelling appears decreased.   · Continue voriconazole 200 mg PO BID.  · Monitor K and MG. Replace as needed.  · Hold all antibiotics at this time.  · Send operative samples for culture.  · Can start meropenem post operatively.  · Re-consult ID after his operative procedure for further recommendations.

## 2023-01-27 NOTE — HPI
Mr. 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  New growth of aspergillus flavus post discharge from his surgical site (10/31/22), was discharged with plan for ertapenem and voriconazole.  Ertapenem stopped on 1/6/23 and remained on the voriconazole.      He is seen by plastics and CTS who had no plans for follow up.  He received a routine CT abdomen to evaluate a ventral hernia which incidentally showed a mixed collection in the R inguinal region (10 cm x 6.1 cm) concerning for hematoma, also another simple fluid collection in the inferolateral aspect measuring 3.3 x 3.8 cm favoring seroma vs lymphocele.  He was advised to come into the hospital due to these findings.      Upon arrival he was feeling well, had no complaints of shortness of breath, chest pain, fevers, dizziness, lightheadedness, palpitations.  Denied any alarms on his LVAD.  When asked about his swelling he reports that he had a mechanical fall about 3 weeks ago and since that time the swelling has gone down.  He overall is in his usual state of health without any complaints.  Vitals were stable, slightly hypertensive on doppler to 92/0.  Labs showed BNP of 521, stable renal function with BUN/Cr ratio of 12/1.4, INR of 3.1.  CRP of 69.  He was admitted to [facility] with concern for R groin hematoma, CT surgery was consulted for evaluation.

## 2023-01-27 NOTE — ASSESSMENT & PLAN NOTE
Procedure: Device Interrogation Including analysis of device parameters  Current Settings: Ventricular Assist Device  Review of device function is stable  Goal INR of 2.0-2.5, INR currently 3.1.  Pharmacy to assist with management, will hold coumadin tonight    TXP LVAD INTERROGATIONS 1/27/2023 1/27/2023 1/12/2023 12/3/2022 12/3/2022 12/3/2022 12/2/2022   Type HeartMate3 HeartMate3 - HeartMate3 HeartMate3 HeartMate3 HeartMate3   Flow 5.5 5.6 - 5.5 5.5 5.6 5.7   Speed 6400 6400 - 6400 6400 6400 6400   PI 2.0 1.5 - 2.5 2.0 1.7 1.6   Power (Jackson) 5.6 5.6 - 5.5 5.6 5.6 5.5   LSL 6000 6000 - 6000 6000 6000 6000   Low Flow Alarm - - - - - - -   High Power Alarm - - - - - - -   Pulsatility - - No Pulse No Pulse No Pulse - -

## 2023-01-28 ENCOUNTER — DOCUMENT SCAN (OUTPATIENT)
Dept: HOME HEALTH SERVICES | Facility: HOSPITAL | Age: 58
End: 2023-01-28
Payer: MEDICARE

## 2023-01-28 LAB
ANION GAP SERPL CALC-SCNC: 9 MMOL/L (ref 8–16)
APTT BLDCRRT: 54.7 SEC (ref 21–32)
BASOPHILS # BLD AUTO: 0.01 K/UL (ref 0–0.2)
BASOPHILS NFR BLD: 0.2 % (ref 0–1.9)
BILIRUB UR QL STRIP: NEGATIVE
BUN SERPL-MCNC: 11 MG/DL (ref 6–20)
CALCIUM SERPL-MCNC: 9.2 MG/DL (ref 8.7–10.5)
CHLORIDE SERPL-SCNC: 103 MMOL/L (ref 95–110)
CLARITY UR REFRACT.AUTO: CLEAR
CO2 SERPL-SCNC: 24 MMOL/L (ref 23–29)
COLOR UR AUTO: COLORLESS
CREAT SERPL-MCNC: 1.5 MG/DL (ref 0.5–1.4)
DIFFERENTIAL METHOD BLD: ABNORMAL
EOSINOPHIL # BLD AUTO: 0.2 K/UL (ref 0–0.5)
EOSINOPHIL NFR BLD: 3.2 % (ref 0–8)
ERYTHROCYTE [DISTWIDTH] IN BLOOD BY AUTOMATED COUNT: 16.9 % (ref 11.5–14.5)
EST. GFR  (NO RACE VARIABLE): 54.3 ML/MIN/1.73 M^2
GLUCOSE SERPL-MCNC: 78 MG/DL (ref 70–110)
GLUCOSE UR QL STRIP: NEGATIVE
HCT VFR BLD AUTO: 32.2 % (ref 40–54)
HGB BLD-MCNC: 9.3 G/DL (ref 14–18)
HGB UR QL STRIP: NEGATIVE
IMM GRANULOCYTES # BLD AUTO: 0.01 K/UL (ref 0–0.04)
IMM GRANULOCYTES NFR BLD AUTO: 0.2 % (ref 0–0.5)
INR PPP: 2.9 (ref 0.8–1.2)
KETONES UR QL STRIP: NEGATIVE
LDH SERPL L TO P-CCNC: 181 U/L (ref 110–260)
LEUKOCYTE ESTERASE UR QL STRIP: NEGATIVE
LYMPHOCYTES # BLD AUTO: 1 K/UL (ref 1–4.8)
LYMPHOCYTES NFR BLD: 18.4 % (ref 18–48)
MAGNESIUM SERPL-MCNC: 2 MG/DL (ref 1.6–2.6)
MCH RBC QN AUTO: 25.9 PG (ref 27–31)
MCHC RBC AUTO-ENTMCNC: 28.9 G/DL (ref 32–36)
MCV RBC AUTO: 90 FL (ref 82–98)
MONOCYTES # BLD AUTO: 0.7 K/UL (ref 0.3–1)
MONOCYTES NFR BLD: 11.8 % (ref 4–15)
NEUTROPHILS # BLD AUTO: 3.7 K/UL (ref 1.8–7.7)
NEUTROPHILS NFR BLD: 66.2 % (ref 38–73)
NITRITE UR QL STRIP: NEGATIVE
NRBC BLD-RTO: 0 /100 WBC
PH UR STRIP: 7 [PH] (ref 5–8)
PHOSPHATE SERPL-MCNC: 2.7 MG/DL (ref 2.7–4.5)
PLATELET # BLD AUTO: 408 K/UL (ref 150–450)
PMV BLD AUTO: 10.6 FL (ref 9.2–12.9)
POTASSIUM SERPL-SCNC: 3.7 MMOL/L (ref 3.5–5.1)
PROT UR QL STRIP: NEGATIVE
PROTHROMBIN TIME: 29 SEC (ref 9–12.5)
RBC # BLD AUTO: 3.59 M/UL (ref 4.6–6.2)
SODIUM SERPL-SCNC: 136 MMOL/L (ref 136–145)
SP GR UR STRIP: 1.01 (ref 1–1.03)
URN SPEC COLLECT METH UR: ABNORMAL
WBC # BLD AUTO: 5.61 K/UL (ref 3.9–12.7)

## 2023-01-28 PROCEDURE — 36415 COLL VENOUS BLD VENIPUNCTURE: CPT | Performed by: STUDENT IN AN ORGANIZED HEALTH CARE EDUCATION/TRAINING PROGRAM

## 2023-01-28 PROCEDURE — 99499 UNLISTED E&M SERVICE: CPT | Mod: ,,, | Performed by: INTERNAL MEDICINE

## 2023-01-28 PROCEDURE — 93750 INTERROGATION VAD IN PERSON: CPT | Mod: ,,, | Performed by: INTERNAL MEDICINE

## 2023-01-28 PROCEDURE — 27000248 HC VAD-ADDITIONAL DAY

## 2023-01-28 PROCEDURE — 81003 URINALYSIS AUTO W/O SCOPE: CPT | Performed by: STUDENT IN AN ORGANIZED HEALTH CARE EDUCATION/TRAINING PROGRAM

## 2023-01-28 PROCEDURE — 80048 BASIC METABOLIC PNL TOTAL CA: CPT | Performed by: STUDENT IN AN ORGANIZED HEALTH CARE EDUCATION/TRAINING PROGRAM

## 2023-01-28 PROCEDURE — 99223 PR INITIAL HOSPITAL CARE,LEVL III: ICD-10-PCS | Mod: ,,, | Performed by: SURGERY

## 2023-01-28 PROCEDURE — 93750 PR INTERROGATE VENT ASSIST DEV, IN PERSON, W PHYSICIAN ANALYSIS: ICD-10-PCS | Mod: ,,, | Performed by: INTERNAL MEDICINE

## 2023-01-28 PROCEDURE — 85025 COMPLETE CBC W/AUTO DIFF WBC: CPT | Performed by: STUDENT IN AN ORGANIZED HEALTH CARE EDUCATION/TRAINING PROGRAM

## 2023-01-28 PROCEDURE — 99233 PR SUBSEQUENT HOSPITAL CARE,LEVL III: ICD-10-PCS | Mod: GC,,, | Performed by: INTERNAL MEDICINE

## 2023-01-28 PROCEDURE — 83735 ASSAY OF MAGNESIUM: CPT | Performed by: STUDENT IN AN ORGANIZED HEALTH CARE EDUCATION/TRAINING PROGRAM

## 2023-01-28 PROCEDURE — 63600175 PHARM REV CODE 636 W HCPCS: Performed by: STUDENT IN AN ORGANIZED HEALTH CARE EDUCATION/TRAINING PROGRAM

## 2023-01-28 PROCEDURE — 99499 NO LOS: ICD-10-PCS | Mod: ,,, | Performed by: INTERNAL MEDICINE

## 2023-01-28 PROCEDURE — 85610 PROTHROMBIN TIME: CPT | Performed by: STUDENT IN AN ORGANIZED HEALTH CARE EDUCATION/TRAINING PROGRAM

## 2023-01-28 PROCEDURE — 85730 THROMBOPLASTIN TIME PARTIAL: CPT | Performed by: STUDENT IN AN ORGANIZED HEALTH CARE EDUCATION/TRAINING PROGRAM

## 2023-01-28 PROCEDURE — 99233 SBSQ HOSP IP/OBS HIGH 50: CPT | Mod: GC,,, | Performed by: INTERNAL MEDICINE

## 2023-01-28 PROCEDURE — 25000003 PHARM REV CODE 250: Performed by: STUDENT IN AN ORGANIZED HEALTH CARE EDUCATION/TRAINING PROGRAM

## 2023-01-28 PROCEDURE — 83615 LACTATE (LD) (LDH) ENZYME: CPT | Performed by: STUDENT IN AN ORGANIZED HEALTH CARE EDUCATION/TRAINING PROGRAM

## 2023-01-28 PROCEDURE — 84100 ASSAY OF PHOSPHORUS: CPT | Performed by: STUDENT IN AN ORGANIZED HEALTH CARE EDUCATION/TRAINING PROGRAM

## 2023-01-28 PROCEDURE — 20600001 HC STEP DOWN PRIVATE ROOM

## 2023-01-28 PROCEDURE — 99223 1ST HOSP IP/OBS HIGH 75: CPT | Mod: ,,, | Performed by: SURGERY

## 2023-01-28 RX ORDER — FUROSEMIDE 80 MG/1
80 TABLET ORAL 2 TIMES DAILY
Status: DISCONTINUED | OUTPATIENT
Start: 2023-01-28 | End: 2023-01-29

## 2023-01-28 RX ORDER — FUROSEMIDE 10 MG/ML
80 INJECTION INTRAMUSCULAR; INTRAVENOUS ONCE
Status: COMPLETED | OUTPATIENT
Start: 2023-01-28 | End: 2023-01-28

## 2023-01-28 RX ORDER — POTASSIUM CHLORIDE 20 MEQ/1
60 TABLET, EXTENDED RELEASE ORAL ONCE
Status: COMPLETED | OUTPATIENT
Start: 2023-01-28 | End: 2023-01-28

## 2023-01-28 RX ADMIN — POTASSIUM CHLORIDE 60 MEQ: 1500 TABLET, EXTENDED RELEASE ORAL at 09:01

## 2023-01-28 RX ADMIN — MIRTAZAPINE 30 MG: 15 TABLET, FILM COATED ORAL at 09:01

## 2023-01-28 RX ADMIN — VORICONAZOLE 200 MG: 200 TABLET, FILM COATED ORAL at 09:01

## 2023-01-28 RX ADMIN — LEVOTHYROXINE SODIUM 88 MCG: 88 TABLET ORAL at 05:01

## 2023-01-28 RX ADMIN — MEXILETINE HYDROCHLORIDE 250 MG: 250 CAPSULE ORAL at 01:01

## 2023-01-28 RX ADMIN — FUROSEMIDE 80 MG: 10 INJECTION, SOLUTION INTRAMUSCULAR; INTRAVENOUS at 09:01

## 2023-01-28 RX ADMIN — Medication 400 MG: at 09:01

## 2023-01-28 RX ADMIN — AMIODARONE HYDROCHLORIDE 400 MG: 200 TABLET ORAL at 09:01

## 2023-01-28 RX ADMIN — AMLODIPINE BESYLATE 5 MG: 5 TABLET ORAL at 09:01

## 2023-01-28 RX ADMIN — MEXILETINE HYDROCHLORIDE 250 MG: 250 CAPSULE ORAL at 05:01

## 2023-01-28 RX ADMIN — MEXILETINE HYDROCHLORIDE 250 MG: 250 CAPSULE ORAL at 09:01

## 2023-01-28 RX ADMIN — PANTOPRAZOLE SODIUM 40 MG: 40 TABLET, DELAYED RELEASE ORAL at 01:01

## 2023-01-28 NOTE — CONSULTS
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 This was repaired in October of last year with vascular surgery and the area was covered with a flap by the plastic surgery team. Evidence on imaging of large fluid collection in groin with concern for seroma/hematoma. SS drainage from ulcerated site on skin. Patient reports the swelling started following a fall earlier this week, and that it has improved significantly over the next few days. Pain is under control. No purulent drainage. No fevers or chills. He is currently anticoagulated at home with coumadin.     No current facility-administered medications on file prior to encounter.     Current Outpatient Medications on File Prior to Encounter   Medication Sig    ALPRAZolam (XANAX) 1 MG tablet Take 0.5-1 tablets (0.5-1 mg total) by mouth 2 (two) times daily as needed for Anxiety.    amiodarone (PACERONE) 200 MG Tab Take 2 tablets (400 mg total) by mouth once daily.    ferrous gluconate (FERGON) 324 MG tablet Take 1 tablet (324 mg total) by mouth 2 (two) times daily with meals.    levothyroxine (SYNTHROID) 88 MCG tablet Take 1 tablet (88 mcg total) by mouth before breakfast.    magnesium oxide (MAG-OX) 400 mg (241.3 mg magnesium) tablet Take 1 tablet (400 mg total) by mouth 2 (two) times daily.    mexiletine (MEXITIL) 250 MG Cap Take 1 capsule (250 mg total) by mouth every 8 (eight) hours.    mirtazapine (REMERON) 30 MG tablet Take 1 tablet (30 mg total) by mouth every evening.    omega-3 acid ethyl esters (LOVAZA) 1 gram capsule Take 2 capsules (2 g total) by  mouth 2 (two) times daily.    pantoprazole (PROTONIX) 40 MG tablet Take 1 tablet (40 mg total) by mouth daily with lunch.    voriconazole (VFEND) 200 MG Tab Take 1 tablet (200 mg total) by mouth 2 (two) times daily.    warfarin (COUMADIN) 5 MG tablet Take a half tablet (2.5mg) by mouth on 10/5 only. Then take 1 tablet (5mg) daily    sodium chloride 0.9% SolP 100 mL with ertapenem 1 gram SolR 1 g Inject 1 g into the vein once daily.    [DISCONTINUED] sildenafiL (VIAGRA) 100 MG tablet Take 1 tablet (100 mg total) by mouth daily as needed for Erectile Dysfunction.       Review of patient's allergies indicates:   Allergen Reactions    Lisinopril Anaphylaxis    Hydralazine     Hydralazine analogues      Chronic constipation, impotence, dizziness       Past Medical History:   Diagnosis Date    A-fib     Anticoagulant long-term use     Atrial flutter 7/30/2022    CHF (congestive heart failure)     Class 1 obesity due to excess calories with serious comorbidity and body mass index (BMI) of 31.0 to 31.9 in adult     Class 1 obesity due to excess calories with serious comorbidity and body mass index (BMI) of 32.0 to 32.9 in adult     Dilated cardiomyopathy 1/10/2018    Disorder of kidney and ureter     CKD    Encounter for blood transfusion     Essential hypertension 8/28/2022    Gout     HTN (hypertension)     Hx of psychiatric care     ICD (implantable cardioverter-defibrillator) infection 7/1/2020    Psychiatric problem     Thyroid disease     Ventricular tachycardia (paroxysmal)      Past Surgical History:   Procedure Laterality Date    AORTIC VALVULOPLASTY N/A 7/13/2022    Procedure: REPAIR, AORTIC VALVE;  Surgeon: Yg Kaufman MD;  Location: [facility] OR [address] [place] [address];  Service: Cardiovascular;  Laterality: N/A;    APPLICATION OF WOUND VACUUM-ASSISTED CLOSURE DEVICE N/A 7/15/2022    Procedure: APPLICATION, WOUND VAC;  Surgeon: Yg Kaufman MD;  Location: [facility] OR [address] [place] [address];  Service: Cardiovascular;  Laterality: N/A;  50 x 5 cm      APPLICATION OF WOUND VACUUM-ASSISTED CLOSURE DEVICE Right 9/27/2022    Procedure: APPLICATION, WOUND VAC;  Surgeon: Kole Tabares MD;  Location: [facility] OR 2ND FLR;  Service: Plastics;  Laterality: Right;    CARDIAC CATHETERIZATION  Dec. 2012    CARDIAC DEFIBRILLATOR PLACEMENT Left     CRRT-D    COLONOSCOPY N/A 3/6/2018    Procedure: COLONOSCOPY;  Surgeon: Alonso Bone MD;  Location: [facility] ENDO (2ND FLR);  Service: Endoscopy;  Laterality: N/A;    COLONOSCOPY N/A 7/17/2019    Procedure: COLONOSCOPY;  Surgeon: Blane Valdez MD;  Location: [facility] ENDO (2ND FLR);  Service: Endoscopy;  Laterality: N/A;    COLONOSCOPY N/A 7/18/2019    Procedure: COLONOSCOPY;  Surgeon: Blane Valdez MD;  Location: [facility] ENDO (2ND FLR);  Service: Endoscopy;  Laterality: N/A;    CREATION OF ILIOFEMORAL ARTERY BYPASS Right 9/27/2022    Procedure: CREATION, BYPASS, ARTERIAL, ILIAC TO FEMORAL WITH GRAFT;  Surgeon: Zach Hernández MD;  [facility]  Service: Peripheral Vascular;  Laterality: Right;    CREATION OF MUSCLE ROTATIONAL FLAP Right 9/27/2022    Procedure: CREATION, FLAP, MUSCLE ROTATION, SARTORIUS AND RECTUS FEMORIS;  Surgeon: Kole Tabares MD;  [facility];  Service: Plastics;  Laterality: Right;    ESOPHAGOGASTRODUODENOSCOPY N/A 7/17/2019    Procedure: EGD (ESOPHAGOGASTRODUODENOSCOPY);  Surgeon: Blane Valdez MD;  Location: [facility] ENDO (2ND FLR);  Service: Endoscopy;  Laterality: N/A;    ESOPHAGOGASTRODUODENOSCOPY N/A 7/18/2019    Procedure: EGD (ESOPHAGOGASTRODUODENOSCOPY);  Surgeon: Blane Valdez MD;  Location: [facility] ENDO (2ND FLR);  Service: Endoscopy;  Laterality: N/A;    FOREIGN BODY REMOVAL N/A 7/22/2022    Procedure: REMOVAL, FOREIGN BODY;  Surgeon: Yg Kaufman MD;  Location: [facility] OR 2ND FLR;  Service: Cardiovascular;  Laterality: N/A;  LVAD Heartmate 2 drive line removal    INSERTION OF GRAFT TO PERICARDIUM N/A 7/15/2022    Procedure: INSERTION, GRAFT, PERICARDIUM;  Surgeon: Yg  MD Shae;  [address]  Service: Cardiovascular;  Laterality: N/A;    IRRIGATION OF MEDIASTINUM N/A 7/15/2022    Procedure: IRRIGATION, MEDIASTINUM;  Surgeon: Yg Kaufman MD;  [address];  Service: Cardiovascular;  Laterality: N/A;    LYSIS OF ADHESIONS  7/13/2022    Procedure: LYSIS, ADHESIONS;  Surgeon: Yg Kaufman MD;  Location: [address];  Service: Cardiovascular;;    NONINVASIVE CARDIAC ELECTROPHYSIOLOGY STUDY N/A 10/18/2019    Procedure: CARDIAC ELECTROPHYSIOLOGY STUDY, NONINVASIVE;  Surgeon: Raz Wagner MD;  Location: [facility] EP LAB;  Service: Cardiology;  Laterality: N/A;  VT, DFTs, MDT CRTD in situ, LVAD, juarez MB, 3098    REPLACEMENT OF IMPLANTABLE CARDIOVERTER-DEFIBRILLATOR (ICD) GENERATOR N/A 3/9/2020    Procedure: REPLACEMENT, ICD GENERATOR;  Surgeon: Harry Yun MD;  Location: [facility] EP LAB;  Service: Cardiology;  Laterality: N/A;  VT, ICD Gen Change and Lead Revision, MDT, MAC, DM,3 Prep    REPLACEMENT OF LEFT VENTRICULAR ASSIST DEVICE (LVAD)  7/13/2022    Procedure: REPLACEMENT, LVAD;  Surgeon: Yg Kaufman MD;  Location: [address];  Service: Cardiovascular;;    REPLACEMENT OF PUMP N/A 7/13/2022    Procedure: REPLACEMENT, PUMP;  Surgeon: Yg Kaufman MD;  Location: [address];  Service: Cardiovascular;  Laterality: N/A;  LVAD pump exchange  EXPLANATION OF HEATMATE 2  IMPLANTATION OF HEARTMATE 3  IMPLANTATION OF 8MM CHIMNEY GRAFT TO RFA  INITIATION OF ECMO  TEMPORARY CLOSURE OF CHEST    REVISION OF IMPLANTABLE CARDIOVERTER-DEFIBRILLATOR (ICD) ELECTRODE LEAD PLACEMENT N/A 3/9/2020    Procedure: REVISION, INSERTION, ELECTRODE LEAD, ICD;  Surgeon: Harry Yun MD;  Location: [facility] EP LAB;  Service: Cardiology;  Laterality: N/A;  VT, ICD Gen Change and Lead Revision, MDT, MAC, DM,3 Prep    STERNAL WOUND CLOSURE N/A 7/14/2022    Procedure: CLOSURE, WOUND, STERNUM;  Surgeon: Yg Kaufman MD;  Location: [address];  Service:  Cardiovascular;  Laterality: N/A;  temporary closure  evacuation of hematoma    STERNAL WOUND CLOSURE N/A 7/15/2022    Procedure: CLOSURE, WOUND, STERNUM;  Surgeon: Yg Kaufman MD;  [facility];  Service: Cardiovascular;  Laterality: N/A;    TRACHEOSTOMY N/A 2022    Procedure: CREATION, TRACHEOSTOMY;  Surgeon: Germain Holt MD;  [facility];  Service: General;  Laterality: N/A;    TREATMENT OF CARDIAC ARRHYTHMIA  10/18/2019    Procedure: Cardioversion or Defibrillation;  Surgeon: Raz Wagner MD;  Location: [facility] EP LAB;  Service: Cardiology;;     Family History       Problem Relation (Age of Onset)    Cancer Sister (54)    Coronary artery disease Father    Diabetes Father    Heart disease Father    Hypertension Father    No Known Problems Mother, Brother          Tobacco Use    Smoking status: Former     Packs/day: 1.00     Years: 31.00     Pack years: 31.00     Types: Cigarettes     Quit date: 2018     Years since quittin.0    Smokeless tobacco: Former    Tobacco comments:     pt is quiting on his own - pt stated not qualified for program;  pt  quit on his own   Substance and Sexual Activity    Alcohol use: No     Alcohol/week: 0.0 standard drinks     Comment: quit    Drug use: No    Sexual activity: Yes     Partners: Female     Birth control/protection: None     Comment: 10/5/17  with same partner 7 years      Review of Systems   Constitutional: Negative.  Negative for chills and fever.   HENT: Negative.     Respiratory: Negative.     Cardiovascular: Negative.    Gastrointestinal: Negative.    Genitourinary: Negative.    Musculoskeletal: Negative.    Objective:     Vital Signs (Most Recent):  Temp: 98.6 °F (37 °C) (23 0750)  Pulse: 68 (23 0750)  Resp: 18 (23 0750)  BP: 106/87 (23 0755)  SpO2: 96 % (23 0750) Vital Signs (24h Range):  Temp:  [97.7 °F (36.5 °C)-99.1 °F (37.3 °C)] 98.6 °F (37 °C)  Pulse:  [30-75] 68  Resp:   [16-20] 18  SpO2:  [95 %-98 %] 96 %  BP: ()/(0-87) 106/87     Weight: 92.6 kg (204 lb 2.3 oz)  Body mass index is 26.93 kg/m².      Intake/Output Summary (Last 24 hours) at 1/28/2023 0986  Last data filed at 1/28/2023 0813  Gross per 24 hour   Intake 444 ml   Output 3810 ml   Net -3366 ml       Physical Exam  Constitutional:       General: He is not in acute distress.     Appearance: Normal appearance.   HENT:      Head: Normocephalic and atraumatic.      Mouth/Throat:      Mouth: Mucous membranes are moist.   Eyes:      Pupils: Pupils are equal, round, and reactive to light.   Cardiovascular:      Rate and Rhythm: Normal rate.   Pulmonary:      Effort: Pulmonary effort is normal. No respiratory distress.   Abdominal:      General: Abdomen is flat. There is no distension.      Palpations: Abdomen is soft.   Musculoskeletal:         General: Normal range of motion.      Cervical back: Normal range of motion.      Comments: R groin swelling, Non-tender, small ulcerated area with SS drainage on dressing.    Skin:     General: Skin is warm and dry.   Neurological:      General: No focal deficit present.      Mental Status: He is alert and oriented to person, place, and time.   Psychiatric:         Mood and Affect: Mood normal.         Behavior: Behavior normal.       Significant Labs:  All pertinent labs from the last 24 hours have been reviewed.    Significant Diagnostics:  I have reviewed all pertinent imaging results/findings within the past 24 hours.    Assessment/Plan:     Active Diagnoses:    Diagnosis Date Noted POA    Pseudoaneurysm of right femoral artery [I72.4] 09/24/2022 Yes    VT (ventricular tachycardia) [I47.20] 10/12/2019 Yes    LVAD (left ventricular assist device) present [Z95.811] 06/15/2018 Not Applicable    Hypertension [I10] 03/27/2018 Yes     Chronic      Problems Resolved During this Admission:     Patient is a 57y M w/ complex cardiac medical history that including cannulation site at the  R femoral artery resulting in pseudoaneurysm that was repaired with vascular surgery and covered with flap from plastic surgery in October of 2022. Fall earlier this week resulting in significant swelling in the right groin that has been improving over the past days. Small area of skin ulceration overlying the fluid collection with SS drainage.     Recommend evaluation of the wound site with plastic surgery   No evidence at this time of infection in the area. Local wound care.    No intervention indicated by the vascular surgery team. He will follow closely with us in clinic to follow his R fem artery aneurysm.     Thank you for your consult. I will follow-up with patient. Please contact us if you have any additional questions.    Nani Davis MD  General Surgery  [facility] - Cardiology Stepdown

## 2023-01-28 NOTE — ASSESSMENT & PLAN NOTE
Not currently on any antihypertensives  Will maintain MAP beetween 65-85  Likely related to volume overload, will give intermittent diuresis and monitor urine output and vitals  Patient with reported upper airway swelling with hydralazine, also does not tolerate lisinopril  Started on amlodipine yesterday

## 2023-01-28 NOTE — SUBJECTIVE & OBJECTIVE
Interval History: No acute events overnight, the patient did note feeling lightheaded upon standing today, blood pressures have been within parameters.  He has no complaints at this time.  Interrogation of LVAD did not show any alarms.    Fluid Balance, received lasix 80mg IVP yesterday evening  Intake 444  Output 3410  Net -2966    Continuous Infusions:  Scheduled Meds:   amiodarone  400 mg Oral Daily    amLODIPine  5 mg Oral Daily    furosemide  80 mg Oral BID    levothyroxine  88 mcg Oral Before breakfast    magnesium oxide  400 mg Oral BID    mexiletine  250 mg Oral Q8H    mirtazapine  30 mg Oral QHS    pantoprazole  40 mg Oral Daily with lunch    voriconazole  200 mg Oral BID     PRN Meds:ALPRAZolam, sodium chloride 0.9%    Review of patient's allergies indicates:   Allergen Reactions    Lisinopril Anaphylaxis    Hydralazine     Hydralazine analogues      Chronic constipation, impotence, dizziness     Objective:     Vital Signs (Most Recent):  Temp: 98.6 °F (37 °C) (01/28/23 0750)  Pulse: 69 (01/28/23 1108)  Resp: 18 (01/28/23 0750)  BP: (!) 70/0 (01/28/23 1051)  SpO2: 96 % (01/28/23 0750)   Vital Signs (24h Range):  Temp:  [97.7 °F (36.5 °C)-99.1 °F (37.3 °C)] 98.6 °F (37 °C)  Pulse:  [30-75] 69  Resp:  [16-18] 18  SpO2:  [95 %-98 %] 96 %  BP: ()/(0-87) 70/0     Patient Vitals for the past 72 hrs (Last 3 readings):   Weight   01/28/23 0829 92.6 kg (204 lb 2.3 oz)   01/27/23 1003 96.6 kg (213 lb)     Body mass index is 26.93 kg/m².      Intake/Output Summary (Last 24 hours) at 1/28/2023 1114  Last data filed at 1/28/2023 1052  Gross per 24 hour   Intake 444 ml   Output 4710 ml   Net -4266 ml       Physical Exam  Constitutional:       Appearance: Normal appearance.   HENT:      Head: Atraumatic.      Mouth/Throat:      Mouth: Mucous membranes are moist.      Pharynx: Oropharynx is clear.   Cardiovascular:      Rate and Rhythm: Normal rate and regular rhythm.      Pulses: Normal pulses.      Heart sounds:  Normal heart sounds.      Comments: VAD hum can be heard, palpable R femoral pulse, no bruit, firm region around R inguinal site  Pulmonary:      Effort: Pulmonary effort is normal.      Breath sounds: Normal breath sounds.   Musculoskeletal:         General: Normal range of motion.      Cervical back: Normal range of motion and neck supple.   Skin:     Capillary Refill: Capillary refill takes 2 to 3 seconds.   Neurological:      General: No focal deficit present.      Mental Status: He is alert and oriented to person, place, and time.       Significant Labs:  CBC:  Recent Labs   Lab 01/27/23  1112 01/28/23 0413   WBC 5.56 5.61   RBC 3.21* 3.59*   HGB 8.3* 9.3*   HCT 28.8* 32.2*    408   MCV 90 90   MCH 25.9* 25.9*   MCHC 28.8* 28.9*     BNP:  Recent Labs   Lab 01/27/23 1112   *     CMP:  Recent Labs   Lab 01/27/23  1112 01/28/23 0413   GLU 79 78   CALCIUM 8.9 9.2   ALBUMIN 2.7*  --    PROT 7.0  --     136   K 4.1 3.7   CO2 24 24    103   BUN 12 11   CREATININE 1.4 1.5*   ALKPHOS 77  --    ALT 21  --    AST 30  --    BILITOT 0.3  --       Coagulation:   Recent Labs   Lab 01/23/23  1616 01/27/23  1112 01/28/23  0413   INR 2.9* 3.1* 2.9*   APTT  --   --  54.7*     LDH:  Recent Labs   Lab 01/28/23  0413        Microbiology:  Microbiology Results (last 7 days)       ** No results found for the last 168 hours. **            I have reviewed all pertinent labs within the past 24 hours.    Estimated Creatinine Clearance: 62.1 mL/min (A) (based on SCr of 1.5 mg/dL (H)).    Diagnostic Results:  I have reviewed all pertinent imaging results/findings within the past 24 hours.

## 2023-01-28 NOTE — NURSING
Notified MD. Torres MAP 93, doppler 98, asymptomatic, HR 60s, IV injection lasix 80 mg ordered and given, hold oral lasix tablet per MD. Torres.

## 2023-01-28 NOTE — PROGRESS NOTES
01/28/23 1043 01/28/23 1045 01/28/23 1047   Vital Signs   Pulse 69  --  71   BP 94/63 (!) 70/0 (!) 83/58   MAP (mmHg) 69  --  66   BP Location Left arm Left arm Left arm   BP Method Automatic Doppler Automatic   Patient Position Standing Standing Sitting      01/28/23 1048 01/28/23 1050 01/28/23 1051   Vital Signs   Pulse  --  75  --    BP (!) 76/0 92/65 (!) 70/0   MAP (mmHg)  --  72  --    BP Location Left arm Left arm Left arm   BP Method Doppler Automatic Doppler   Patient Position Sitting Lying Lying     Notified MD. Torres pt c/o little dizzy when he gets up and orthostatic BP done, and notified team orthostatic BP result, WCTM.

## 2023-01-28 NOTE — PROGRESS NOTES
[facility] - [facility] Step[facility]  Heart Transplant  Progress Note    Patient Name: Tim Richards  MRN: [id number]  Admission Date: 1/26/2023  Hospital Length of Stay: 2 days  Attending Physician: Isaiah Santizo MD  Primary Care Provider: Diego Daniel MD  Principal Problem:<principal problem not specified>    Subjective:     Interval History: No acute events overnight, the patient did note feeling lightheaded upon standing today, blood pressures have been within parameters.  He has no complaints at this time.  Interrogation of LVAD did not show any alarms.    Fluid Balance, received lasix 80mg IVP yesterday evening  Intake 444  Output 3410  Net -2966    Continuous Infusions:  Scheduled Meds:   amiodarone  400 mg Oral Daily    amLODIPine  5 mg Oral Daily    furosemide  80 mg Oral BID    levothyroxine  88 mcg Oral Before breakfast    magnesium oxide  400 mg Oral BID    mexiletine  250 mg Oral Q8H    mirtazapine  30 mg Oral QHS    pantoprazole  40 mg Oral Daily with lunch    voriconazole  200 mg Oral BID     PRN Meds:ALPRAZolam, sodium chloride 0.9%    Review of patient's allergies indicates:   Allergen Reactions    Lisinopril Anaphylaxis    Hydralazine     Hydralazine analogues      Chronic constipation, impotence, dizziness     Objective:     Vital Signs (Most Recent):  Temp: 98.6 °F (37 °C) (01/28/23 0750)  Pulse: 69 (01/28/23 1108)  Resp: 18 (01/28/23 0750)  BP: (!) 70/0 (01/28/23 1051)  SpO2: 96 % (01/28/23 0750)   Vital Signs (24h Range):  Temp:  [97.7 °F (36.5 °C)-99.1 °F (37.3 °C)] 98.6 °F (37 °C)  Pulse:  [30-75] 69  Resp:  [16-18] 18  SpO2:  [95 %-98 %] 96 %  BP: ()/(0-87) 70/0     Patient Vitals for the past 72 hrs (Last 3 readings):   Weight   01/28/23 0829 92.6 kg (204 lb 2.3 oz)   01/27/23 1003 96.6 kg (213 lb)     Body mass index is 26.93 kg/m².      Intake/Output Summary (Last 24 hours) at 1/28/2023 1114  Last data filed at 1/28/2023 1052  Gross per 24 hour   Intake 444 ml    Output 4710 ml   Net -4266 ml       Physical Exam  Constitutional:       Appearance: Normal appearance.   HENT:      Head: Atraumatic.      Mouth/Throat:      Mouth: Mucous membranes are moist.      Pharynx: Oropharynx is clear.   Cardiovascular:      Rate and Rhythm: Normal rate and regular rhythm.      Pulses: Normal pulses.      Heart sounds: Normal heart sounds.      Comments: VAD hum can be heard, palpable R femoral pulse, no bruit, firm region around R inguinal site  Pulmonary:      Effort: Pulmonary effort is normal.      Breath sounds: Normal breath sounds.   Musculoskeletal:         General: Normal range of motion.      Cervical back: Normal range of motion and neck supple.   Skin:     Capillary Refill: Capillary refill takes 2 to 3 seconds.   Neurological:      General: No focal deficit present.      Mental Status: He is alert and oriented to person, place, and time.       Significant Labs:  CBC:  Recent Labs   Lab 01/27/23  1112 01/28/23 0413   WBC 5.56 5.61   RBC 3.21* 3.59*   HGB 8.3* 9.3*   HCT 28.8* 32.2*    408   MCV 90 90   MCH 25.9* 25.9*   MCHC 28.8* 28.9*     BNP:  Recent Labs   Lab 01/27/23  1112   *     CMP:  Recent Labs   Lab 01/27/23  1112 01/28/23  0413   GLU 79 78   CALCIUM 8.9 9.2   ALBUMIN 2.7*  --    PROT 7.0  --     136   K 4.1 3.7   CO2 24 24    103   BUN 12 11   CREATININE 1.4 1.5*   ALKPHOS 77  --    ALT 21  --    AST 30  --    BILITOT 0.3  --       Coagulation:   Recent Labs   Lab 01/23/23  1616 01/27/23  1112 01/28/23  0413   INR 2.9* 3.1* 2.9*   APTT  --   --  54.7*     LDH:  Recent Labs   Lab 01/28/23  0413        Microbiology:  Microbiology Results (last 7 days)       ** No results found for the last 168 hours. **            I have reviewed all pertinent labs within the past 24 hours.    Estimated Creatinine Clearance: 62.1 mL/min (A) (based on SCr of 1.5 mg/dL (H)).    Diagnostic Results:  I have reviewed all pertinent imaging  results/findings within the past 24 hours.    Assessment and Plan:     Mr. 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  New growth of aspergillus flavus post discharge from his surgical site (10/31/22), was discharged with plan for ertapenem and voriconazole.  Ertapenem stopped on 1/6/23 and remained on the voriconazole.      He is seen by plastics and CTS who had no plans for follow up.  He received a routine CT abdomen to evaluate a ventral hernia which incidentally showed a mixed collection in the R inguinal region (10 cm x 6.1 cm) concerning for hematoma, also another simple fluid collection in the inferolateral aspect measuring 3.3 x 3.8 cm favoring seroma vs lymphocele.  He was advised to come into the hospital due to these findings.      Upon arrival he was feeling well, had no complaints of shortness of breath, chest pain, fevers, dizziness, lightheadedness, palpitations.  Denied any alarms on his LVAD.  When asked about his swelling he reports that he had a mechanical fall about 3 weeks ago and since that time the swelling has gone down.  He overall is in his usual state of health without any complaints.  Vitals were stable, slightly hypertensive on doppler to 92/0.  Labs showed BNP of 521, stable renal function with BUN/Cr ratio of 12/1.4, INR of 3.1.  CRP of 69.  He was admitted to [facility] with concern for R groin hematoma, CT surgery was consulted for evaluation.      Pseudoaneurysm of right femoral artery  -Va ECMO decannulated on  7/19/22  -History of cultures positive for ESBL ecoli, ecoli, proteus mirabilis, and aspergillus flavus (see micro tab)  -Was previously on ertapenem and voriconazole, ertapenem stopped on 1/6/23.  Remains on voriconazole  -CT abdomen obtained for evaluation of ventral hernia but incidentally noted mass in R groin concerning for hematoma as well as inferolateral mass for lymphocele vs seroma  -low suspicion for infection at this time, consulted vascular surgery for evaluation.   -recommendation by vascular surgery for no inpatient surgical intervention, recommended plastic surgery evaluation      VT (ventricular tachycardia)  Continue amiodarone 400mg daily for VT prophylaxis  ICD present    LVAD (left ventricular assist device) present  Procedure: Device Interrogation Including analysis of device parameters  Current Settings: Ventricular Assist Device  Review of device function is stable  Goal INR of 2.0-2.5, INR currently 2.9. Pharmacy to assist with management, will hold coumadin tonight    TXP LVAD INTERROGATIONS 1/28/2023 1/28/2023 1/28/2023 1/27/2023 1/27/2023 1/27/2023 1/27/2023   Type HeartMate3 HeartMate3 HeartMate3 HeartMate3 HeartMate3 HeartMate3 HeartMate3   Flow 5.7 5.7 5.6 5.7 5.5 5.5 5.6   Speed 6400 6450 6400 6400 6400 6400 6400   PI 1.9 1.8 1.8 1.3 2.2 2.0 1.5   Power (Jackson) 5.6 5.6 5.6 5.5 5.5 5.6 5.6   LSL 6000 600 6000 6000 6000 6000 6000   Low Flow Alarm - - - - - - -   High Power Alarm - - - - - - -   Pulsatility Pulse - Intermittent pulse Intermittent pulse - - -       Hypertension  Not currently on any antihypertensives  Will maintain MAP beetween 65-85  Likely related to volume overload, will give intermittent diuresis and monitor urine output and vitals  Patient with reported upper airway swelling with hydralazine, also does not tolerate lisinopril  Started on amlodipine yesterday        Nic Torres MD  Heart Transplant  [facility] - [facility]

## 2023-01-28 NOTE — NURSING
1100 Pt's VAD dressing changed per protocol using kit and sterile technique. Pt's drive line site is draining, intact with moderate light pink,, yellow, light green drainage at site and old dressing,  DLES is + (2). No kinks or frays on drive line, secured with sun anchor. Pt tolerated dressing change well. Next dressing change due 01/30/2023.  Picture took in epic media, team aware. Notified MD. Torres LVAD dressing changed already at 1100, and MD. Torres is ok to get culture tomorrow.

## 2023-01-28 NOTE — ASSESSMENT & PLAN NOTE
Procedure: Device Interrogation Including analysis of device parameters  Current Settings: Ventricular Assist Device  Review of device function is stable  Goal INR of 2.0-2.5, INR currently 2.9. Pharmacy to assist with management, will hold coumadin tonight    TXP LVAD INTERROGATIONS 1/28/2023 1/28/2023 1/28/2023 1/27/2023 1/27/2023 1/27/2023 1/27/2023   Type HeartMate3 HeartMate3 HeartMate3 HeartMate3 HeartMate3 HeartMate3 HeartMate3   Flow 5.7 5.7 5.6 5.7 5.5 5.5 5.6   Speed 6400 6450 6400 6400 6400 6400 6400   PI 1.9 1.8 1.8 1.3 2.2 2.0 1.5   Power (Jackson) 5.6 5.6 5.6 5.5 5.5 5.6 5.6   LSL 6000 600 6000 6000 6000 6000 6000   Low Flow Alarm - - - - - - -   High Power Alarm - - - - - - -   Pulsatility Pulse - Intermittent pulse Intermittent pulse - - -

## 2023-01-29 VITALS
HEIGHT: 73 IN | SYSTOLIC BLOOD PRESSURE: 87 MMHG | WEIGHT: 200.38 LBS | DIASTOLIC BLOOD PRESSURE: 61 MMHG | HEART RATE: 74 BPM | OXYGEN SATURATION: 95 % | TEMPERATURE: 99 F | BODY MASS INDEX: 26.56 KG/M2 | RESPIRATION RATE: 18 BRPM

## 2023-01-29 LAB
ANION GAP SERPL CALC-SCNC: 13 MMOL/L (ref 8–16)
APTT BLDCRRT: 51.6 SEC (ref 21–32)
BASOPHILS # BLD AUTO: 0.01 K/UL (ref 0–0.2)
BASOPHILS NFR BLD: 0.2 % (ref 0–1.9)
BUN SERPL-MCNC: 12 MG/DL (ref 6–20)
CALCIUM SERPL-MCNC: 9.8 MG/DL (ref 8.7–10.5)
CHLORIDE SERPL-SCNC: 99 MMOL/L (ref 95–110)
CO2 SERPL-SCNC: 24 MMOL/L (ref 23–29)
CREAT SERPL-MCNC: 1.5 MG/DL (ref 0.5–1.4)
DIFFERENTIAL METHOD BLD: ABNORMAL
EOSINOPHIL # BLD AUTO: 0.1 K/UL (ref 0–0.5)
EOSINOPHIL NFR BLD: 2.3 % (ref 0–8)
ERYTHROCYTE [DISTWIDTH] IN BLOOD BY AUTOMATED COUNT: 16.6 % (ref 11.5–14.5)
EST. GFR  (NO RACE VARIABLE): 54.3 ML/MIN/1.73 M^2
GLUCOSE SERPL-MCNC: 94 MG/DL (ref 70–110)
HCT VFR BLD AUTO: 34.8 % (ref 40–54)
HGB BLD-MCNC: 10.2 G/DL (ref 14–18)
IMM GRANULOCYTES # BLD AUTO: 0.01 K/UL (ref 0–0.04)
IMM GRANULOCYTES NFR BLD AUTO: 0.2 % (ref 0–0.5)
INR PPP: 2.3 (ref 0.8–1.2)
LDH SERPL L TO P-CCNC: 203 U/L (ref 110–260)
LYMPHOCYTES # BLD AUTO: 1.1 K/UL (ref 1–4.8)
LYMPHOCYTES NFR BLD: 21.3 % (ref 18–48)
MAGNESIUM SERPL-MCNC: 2 MG/DL (ref 1.6–2.6)
MCH RBC QN AUTO: 26 PG (ref 27–31)
MCHC RBC AUTO-ENTMCNC: 29.3 G/DL (ref 32–36)
MCV RBC AUTO: 89 FL (ref 82–98)
MONOCYTES # BLD AUTO: 0.7 K/UL (ref 0.3–1)
MONOCYTES NFR BLD: 13.2 % (ref 4–15)
NEUTROPHILS # BLD AUTO: 3.3 K/UL (ref 1.8–7.7)
NEUTROPHILS NFR BLD: 62.8 % (ref 38–73)
NRBC BLD-RTO: 0 /100 WBC
PHOSPHATE SERPL-MCNC: 2.9 MG/DL (ref 2.7–4.5)
PLATELET # BLD AUTO: 414 K/UL (ref 150–450)
PMV BLD AUTO: 10 FL (ref 9.2–12.9)
POTASSIUM SERPL-SCNC: 4 MMOL/L (ref 3.5–5.1)
PROTHROMBIN TIME: 23.3 SEC (ref 9–12.5)
RBC # BLD AUTO: 3.93 M/UL (ref 4.6–6.2)
SODIUM SERPL-SCNC: 136 MMOL/L (ref 136–145)
WBC # BLD AUTO: 5.31 K/UL (ref 3.9–12.7)

## 2023-01-29 PROCEDURE — 85025 COMPLETE CBC W/AUTO DIFF WBC: CPT | Performed by: STUDENT IN AN ORGANIZED HEALTH CARE EDUCATION/TRAINING PROGRAM

## 2023-01-29 PROCEDURE — 25000003 PHARM REV CODE 250: Performed by: STUDENT IN AN ORGANIZED HEALTH CARE EDUCATION/TRAINING PROGRAM

## 2023-01-29 PROCEDURE — 83615 LACTATE (LD) (LDH) ENZYME: CPT | Performed by: STUDENT IN AN ORGANIZED HEALTH CARE EDUCATION/TRAINING PROGRAM

## 2023-01-29 PROCEDURE — 83735 ASSAY OF MAGNESIUM: CPT | Performed by: STUDENT IN AN ORGANIZED HEALTH CARE EDUCATION/TRAINING PROGRAM

## 2023-01-29 PROCEDURE — 85610 PROTHROMBIN TIME: CPT | Performed by: STUDENT IN AN ORGANIZED HEALTH CARE EDUCATION/TRAINING PROGRAM

## 2023-01-29 PROCEDURE — 99233 PR SUBSEQUENT HOSPITAL CARE,LEVL III: ICD-10-PCS | Mod: GC,,, | Performed by: INTERNAL MEDICINE

## 2023-01-29 PROCEDURE — 84100 ASSAY OF PHOSPHORUS: CPT | Performed by: STUDENT IN AN ORGANIZED HEALTH CARE EDUCATION/TRAINING PROGRAM

## 2023-01-29 PROCEDURE — 27000248 HC VAD-ADDITIONAL DAY

## 2023-01-29 PROCEDURE — 87070 CULTURE OTHR SPECIMN AEROBIC: CPT | Performed by: STUDENT IN AN ORGANIZED HEALTH CARE EDUCATION/TRAINING PROGRAM

## 2023-01-29 PROCEDURE — 85730 THROMBOPLASTIN TIME PARTIAL: CPT | Performed by: STUDENT IN AN ORGANIZED HEALTH CARE EDUCATION/TRAINING PROGRAM

## 2023-01-29 PROCEDURE — 20600001 HC STEP DOWN PRIVATE ROOM

## 2023-01-29 PROCEDURE — 99233 SBSQ HOSP IP/OBS HIGH 50: CPT | Mod: GC,,, | Performed by: INTERNAL MEDICINE

## 2023-01-29 PROCEDURE — 87075 CULTR BACTERIA EXCEPT BLOOD: CPT | Performed by: STUDENT IN AN ORGANIZED HEALTH CARE EDUCATION/TRAINING PROGRAM

## 2023-01-29 PROCEDURE — 80048 BASIC METABOLIC PNL TOTAL CA: CPT | Performed by: STUDENT IN AN ORGANIZED HEALTH CARE EDUCATION/TRAINING PROGRAM

## 2023-01-29 PROCEDURE — 36415 COLL VENOUS BLD VENIPUNCTURE: CPT | Performed by: STUDENT IN AN ORGANIZED HEALTH CARE EDUCATION/TRAINING PROGRAM

## 2023-01-29 RX ORDER — FUROSEMIDE 40 MG/1
40 TABLET ORAL DAILY
Status: DISCONTINUED | OUTPATIENT
Start: 2023-01-29 | End: 2023-01-30 | Stop reason: HOSPADM

## 2023-01-29 RX ORDER — FUROSEMIDE 40 MG/1
40 TABLET ORAL DAILY
Qty: 30 TABLET | Refills: 11 | Status: SHIPPED | OUTPATIENT
Start: 2023-01-30 | End: 2023-02-23 | Stop reason: SDUPTHER

## 2023-01-29 RX ADMIN — LEVOTHYROXINE SODIUM 88 MCG: 88 TABLET ORAL at 05:01

## 2023-01-29 RX ADMIN — Medication 400 MG: at 08:01

## 2023-01-29 RX ADMIN — PANTOPRAZOLE SODIUM 40 MG: 40 TABLET, DELAYED RELEASE ORAL at 12:01

## 2023-01-29 RX ADMIN — MEXILETINE HYDROCHLORIDE 250 MG: 250 CAPSULE ORAL at 01:01

## 2023-01-29 RX ADMIN — AMIODARONE HYDROCHLORIDE 400 MG: 200 TABLET ORAL at 08:01

## 2023-01-29 RX ADMIN — SODIUM CHLORIDE 500 ML: 0.9 INJECTION, SOLUTION INTRAVENOUS at 05:01

## 2023-01-29 RX ADMIN — MEXILETINE HYDROCHLORIDE 250 MG: 250 CAPSULE ORAL at 05:01

## 2023-01-29 RX ADMIN — AMLODIPINE BESYLATE 5 MG: 5 TABLET ORAL at 08:01

## 2023-01-29 RX ADMIN — VORICONAZOLE 200 MG: 200 TABLET, FILM COATED ORAL at 08:01

## 2023-01-29 RX ADMIN — FUROSEMIDE 40 MG: 40 TABLET ORAL at 01:01

## 2023-01-29 NOTE — HOSPITAL COURSE
The patient was admitted due to abnormal CT findings of an enlarging mass in his R groin near his surgical site from his pseudoaneurysm.  Upon arrival he felt well, did not have any complaints of pain.  On physical exam he had a firm indurated area near his R groin that appeared to be a hematoma without active bleeding.  Vascular surgery was consulted for evaluation and after review of the imaging determined no need for urgent surgical intervention, with plan for close follow up in clinic in 1 week.  Ultrasound performed showed no abscess.  Of note, while he was admitted he had high doppler pressures that were most likely related to elevated fluid status.  He was given two doses of lasix 80mg IV with good diuresis.  Plan was for initiation of lasix 40mg po daily for maintenance of euvolemia.

## 2023-01-29 NOTE — NURSING TRANSFER
Nursing Transfer Note      1/29/2023     Reason patient is being transferred:  US for RT groin  done    Transfer To: room 304    Transfer via wheelchair    Transfer with cardiac monitoring    Transported by tech    Medicines sent: none    Any special needs or follow-up needed: LVAD    Chart send with patient: No    Patient reassessed at: 1025 01/29/2023   Upon arrival to floor: cardiac monitor applied, patient oriented to room, call bell in reach, and bed in lowest position

## 2023-01-29 NOTE — NURSING TRANSFER
Nursing Transfer Note      1/29/2023     Reason patient is being transferred: US for Rt groin    Transfer To: ultrasound unit    Transfer via wheelchair    Transfer with cardiac monitoring    Transported by transportation tech    Medicines sent: none    Any special needs or follow-up needed: LVAD

## 2023-01-29 NOTE — SUBJECTIVE & OBJECTIVE
Interval History: No acute events overnight. Patient feels well overall.  No alarms upon interrogation of LVAD and patient ambulating well.      Patient was evaluated by vascular surgery, no acute intervention.  Ultrasound performed today that confirms hematoma.  Discussed with vascular surgery who will follow up with patient in 1 week, plan for discharge today.      Continuous Infusions:  Scheduled Meds:   amiodarone  400 mg Oral Daily    amLODIPine  5 mg Oral Daily    furosemide  40 mg Oral Daily    levothyroxine  88 mcg Oral Before breakfast    magnesium oxide  400 mg Oral BID    mexiletine  250 mg Oral Q8H    mirtazapine  30 mg Oral QHS    pantoprazole  40 mg Oral Daily with lunch    voriconazole  200 mg Oral BID     PRN Meds:ALPRAZolam, sodium chloride 0.9%    Review of patient's allergies indicates:   Allergen Reactions    Lisinopril Anaphylaxis    Hydralazine     Hydralazine analogues      Chronic constipation, impotence, dizziness     Objective:     Vital Signs (Most Recent):  Temp: 98.3 °F (36.8 °C) (01/29/23 1155)  Pulse: 72 (01/29/23 1155)  Resp: 18 (01/29/23 1155)  BP: 97/64 (01/29/23 1158)  SpO2: 95 % (01/29/23 1155) Vital Signs (24h Range):  Temp:  [97.4 °F (36.3 °C)-98.6 °F (37 °C)] 98.3 °F (36.8 °C)  Pulse:  [30-76] 72  Resp:  [17-18] 18  SpO2:  [94 %-97 %] 95 %  BP: ()/(0-78) 97/64     Patient Vitals for the past 72 hrs (Last 3 readings):   Weight   01/29/23 0719 90.9 kg (200 lb 6.4 oz)   01/28/23 0829 92.6 kg (204 lb 2.3 oz)   01/27/23 1003 96.6 kg (213 lb)     Body mass index is 26.44 kg/m².      Intake/Output Summary (Last 24 hours) at 1/29/2023 1334  Last data filed at 1/29/2023 1200  Gross per 24 hour   Intake 702 ml   Output 1300 ml   Net -598 ml     Physical Exam  Constitutional:       Appearance: Normal appearance.   HENT:      Head: Atraumatic.      Mouth/Throat:      Mouth: Mucous membranes are moist.      Pharynx: Oropharynx is clear.   Cardiovascular:      Rate and Rhythm: Normal  rate and regular rhythm.      Pulses: Normal pulses.      Heart sounds: Normal heart sounds.      Comments: VAD hum can be heard, palpable R femoral pulse, no bruit, firm region around R inguinal site  Pulmonary:      Effort: Pulmonary effort is normal.      Breath sounds: Normal breath sounds.   Musculoskeletal:         General: Normal range of motion.      Cervical back: Normal range of motion and neck supple.   Skin:     Capillary Refill: Capillary refill takes 2 to 3 seconds.   Neurological:      General: No focal deficit present.      Mental Status: He is alert and oriented to person, place, and time.       Significant Labs:  CBC:  Recent Labs   Lab 01/27/23  1112 01/28/23  0413 01/29/23  0503   WBC 5.56 5.61 5.31   RBC 3.21* 3.59* 3.93*   HGB 8.3* 9.3* 10.2*   HCT 28.8* 32.2* 34.8*    408 414   MCV 90 90 89   MCH 25.9* 25.9* 26.0*   MCHC 28.8* 28.9* 29.3*     BNP:  Recent Labs   Lab 01/27/23  1112   *     CMP:  Recent Labs   Lab 01/27/23  1112 01/28/23  0413 01/29/23  0503   GLU 79 78 94   CALCIUM 8.9 9.2 9.8   ALBUMIN 2.7*  --   --    PROT 7.0  --   --     136 136   K 4.1 3.7 4.0   CO2 24 24 24    103 99   BUN 12 11 12   CREATININE 1.4 1.5* 1.5*   ALKPHOS 77  --   --    ALT 21  --   --    AST 30  --   --    BILITOT 0.3  --   --       Coagulation:   Recent Labs   Lab 01/27/23  1112 01/28/23  0413 01/29/23  0503   INR 3.1* 2.9* 2.3*   APTT  --  54.7* 51.6*     LDH:  Recent Labs   Lab 01/28/23  0413 01/29/23  0503    203     Microbiology:  Microbiology Results (last 7 days)       Procedure Component Value Units Date/Time    Aerobic culture [[id number]] Collected: 01/29/23 1014    Order Status: Sent Specimen: Skin from Abdomen Updated: 01/29/23 1020    Culture, Anaerobe [[id number]] Collected: 01/29/23 1014    Order Status: Sent Specimen: Skin from Abdomen Updated: 01/29/23 1019            I have reviewed all pertinent labs within the past 24 hours.    Estimated Creatinine  Clearance: 62.1 mL/min (A) (based on SCr of 1.5 mg/dL (H)).    Diagnostic Results:  I have reviewed all pertinent imaging results/findings within the past 24 hours.

## 2023-01-29 NOTE — CONSULTS
RD consulted for nutritional assessment. Pt reports a good appetite currently and PTA. Pt w/ no s/s of malnutrition at this time. Pt w/ no nutritional questions/concerns at this time.       Thank you.

## 2023-01-29 NOTE — NURSING
DLES culture collected,  and Pt's VAD dressing changed per protocol using kit and sterile technique. Pt's drive line site is draining, intact with moderate light pink,, yellow, light green drainage at site and old dressing, DLES is + (2). No kinks or frays on drive line, secured with sun anchor. Pt tolerated dressing change well. After dressing changed, RN suggested to get changed daily instead QOD, but Pt refuse to get dressing changed daily, he said not tomorrow, ok with QOD, Next dressing change due 01/31/2023.

## 2023-01-29 NOTE — ASSESSMENT & PLAN NOTE
Not currently on any antihypertensives  Will maintain MAP beetween 65-85  Likely related to volume overload, will give intermittent diuresis and monitor urine output and vitals  Patient with reported upper airway swelling with hydralazine, also does not tolerate lisinopril  Will discontinue amlodipine, high MAPs related to volume issues.  Will plan for discharge on maintenance diuretics at lasix 40mg po daily

## 2023-01-29 NOTE — SUBJECTIVE & OBJECTIVE
Medications:  Continuous Infusions:  Scheduled Meds:   amiodarone  400 mg Oral Daily    amLODIPine  5 mg Oral Daily    furosemide  80 mg Oral BID    levothyroxine  88 mcg Oral Before breakfast    magnesium oxide  400 mg Oral BID    mexiletine  250 mg Oral Q8H    mirtazapine  30 mg Oral QHS    pantoprazole  40 mg Oral Daily with lunch    voriconazole  200 mg Oral BID     PRN Meds:ALPRAZolam, sodium chloride 0.9%     Objective:     Vital Signs (Most Recent):  Temp: 98.1 °F (36.7 °C) (01/29/23 0750)  Pulse: 69 (01/29/23 0750)  Resp: 18 (01/29/23 0750)  BP: 105/73 (01/29/23 0755)  SpO2: 96 % (01/29/23 0750) Vital Signs (24h Range):  Temp:  [97.4 °F (36.3 °C)-98.6 °F (37 °C)] 98.1 °F (36.7 °C)  Pulse:  [30-76] 69  Resp:  [17-18] 18  SpO2:  [94 %-97 %] 96 %  BP: ()/(0-78) 105/73     Date 01/29/23 0700 - 01/30/23 0659   Shift 4714-6859 8001-3633 6829-7332 24 Hour Total   INTAKE   Shift Total(mL/kg)       OUTPUT   Urine(mL/kg/hr) 400   400   Shift Total(mL/kg) 400(4.4)   400(4.4)   Weight (kg) 90.9 90.9 90.9 90.9       Physical Exam  Constitutional:       General: He is not in acute distress.     Appearance: Normal appearance.   HENT:      Head: Normocephalic and atraumatic.      Mouth/Throat:      Mouth: Mucous membranes are moist.   Eyes:      Pupils: Pupils are equal, round, and reactive to light.   Cardiovascular:      Rate and Rhythm: Normal rate.   Pulmonary:      Effort: Pulmonary effort is normal. No respiratory distress.   Abdominal:      General: Abdomen is flat. There is no distension.      Palpations: Abdomen is soft.   Musculoskeletal:         General: Normal range of motion.      Cervical back: Normal range of motion.      Comments: R groin with moderate swelling and area of ulceration w/ some SS drainage on dressing.   Skin:     General: Skin is warm and dry.   Neurological:      General: No focal deficit present.      Mental Status: He is alert and oriented to person, place, and time.   Psychiatric:          Mood and Affect: Mood normal.         Behavior: Behavior normal.       Significant Labs:  All pertinent labs from the last 24 hours have been reviewed.    Significant Diagnostics:  I have reviewed all pertinent imaging results/findings within the past 24 hours.

## 2023-01-29 NOTE — PROGRESS NOTES
01/29/2023  Fitz Christianson    Current provider:  Isaiah Santizo MD    Device interrogation:  TXP LVAD INTERROGATIONS 1/29/2023 1/29/2023 1/29/2023 1/28/2023 1/28/2023 1/28/2023 1/28/2023   Type HeartMate3 HeartMate3 HeartMate3 HeartMate3 HeartMate3 HeartMate3 HeartMate3   Flow 5.6 5.8 5.6 5.6 5.6 5.6 5.7   Speed 6400 6450 6400 6400 6400 6400 6400   PI 2.0 1.5 1.5 1.6 1.4 2.1 1.9   Power (Jackson) 5.6 5.7 5.6 5.6 5.6 5.6 5.6   LSL 6000 6000 6000 6000 6000 6000 6000   Low Flow Alarm - - - - - - -   High Power Alarm - - - - - - -   Pulsatility Pulse - Intermittent pulse Intermittent pulse Pulse Pulse Pulse          Rounded on Tim Richards to ensure all mechanical assist device settings (IABP or VAD) were appropriate and all parameters were within limits.  I was able to ensure all back up equipment was present, the staff had no issues, and the Perfusion Department daily rounding was complete.      For implantable VADs: Interrogation of Ventricular assist device was performed with analysis of device parameters and review of device function. I have personally reviewed the interrogation findings and agree with findings as stated.     In emergency, the nursing units have been notified to contact the perfusion department either by:  Calling [id number] from 630am to 4pm Mon thru Fri, utilizing the On-Call Finder functionality of Epic and searching for Perfusion, or by contacting the hospital  from 4pm to 630am and on weekends and asking to speak with the perfusionist on call.    12:17 PM

## 2023-01-29 NOTE — ASSESSMENT & PLAN NOTE
-Va ECMO decannulated on 7/19/22  -History of cultures positive for ESBL ecoli, ecoli, proteus mirabilis, and aspergillus flavus (see micro tab)  -Was previously on ertapenem and voriconazole, ertapenem stopped on 1/6/23.  Remains on voriconazole  -CT abdomen obtained for evaluation of ventral hernia but incidentally noted mass in R groin concerning for hematoma as well as inferolateral mass for lymphocele vs seroma  -low suspicion for infection at this time, consulted vascular surgery for evaluation.   -recommendation by vascular surgery for no inpatient surgical intervention  -Will follow up with vascular surgery in clinic in 1 week

## 2023-01-29 NOTE — NURSING
Notified MD. Torres Map 84, doppler 88, and pt refused 80 mg lasix tablet, pt thinks 80 mg is too much. WCTM.

## 2023-01-29 NOTE — NURSING
Notified MD. Torres pt c/o very dizzy when standing up, MAP 72, doppler 68, LVAD number WNL, NS bolus ordered and confirm with MD. Torres with rate , then given.

## 2023-01-29 NOTE — ASSESSMENT & PLAN NOTE
Procedure: Device Interrogation Including analysis of device parameters  Current Settings: Ventricular Assist Device  Review of device function is stable  Goal INR of 2.0-2.5, INR currently 2.9. Pharmacy to assist with management, will hold coumadin tonight    TXP LVAD INTERROGATIONS 1/29/2023 1/29/2023 1/29/2023 1/28/2023 1/28/2023 1/28/2023 1/28/2023   Type HeartMate3 HeartMate3 HeartMate3 HeartMate3 HeartMate3 HeartMate3 HeartMate3   Flow 5.6 5.8 5.6 5.6 5.6 5.6 5.7   Speed 6400 6450 6400 6400 6400 6400 6400   PI 2.0 1.5 1.5 1.6 1.4 2.1 1.9   Power (Jackson) 5.6 5.7 5.6 5.6 5.6 5.6 5.6   LSL 6000 6000 6000 6000 6000 6000 6000   Low Flow Alarm - - - - - - -   High Power Alarm - - - - - - -   Pulsatility Pulse - Intermittent pulse Intermittent pulse Pulse Pulse Pulse

## 2023-01-29 NOTE — DISCHARGE SUMMARY
[facility] - [facility] Step[facility]  Heart Transplant  Discharge Summary      Patient Name: Tim Richards  MRN: [id number]  Admission Date: 1/26/2023  Hospital Length of Stay: 3 days  Discharge Date and Time: 01/29/2023 2:21 PM  Attending Physician: Isaiah Santizo MD   Discharging Provider: Nic Torres MD  Primary Care Provider: Diego Daniel MD     HPI: Mr. 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  New growth of aspergillus flavus post discharge from his surgical site (10/31/22), was discharged with plan for ertapenem and voriconazole.  Ertapenem stopped on 1/6/23 and remained on the voriconazole.      He is seen by plastics and CTS who had no plans for follow up.  He received a routine CT abdomen to evaluate a ventral hernia which incidentally showed a mixed collection in the R inguinal region (10 cm x 6.1 cm) concerning for hematoma, also another simple fluid collection in the inferolateral aspect measuring 3.3 x 3.8 cm favoring seroma vs lymphocele.  He was advised to come into the hospital due to these findings.      Upon arrival he was feeling well, had no complaints of shortness of breath, chest pain, fevers, dizziness, lightheadedness, palpitations.  Denied any alarms on his LVAD.  When asked about his swelling he reports that he had a mechanical fall about 3 weeks ago and since that time the swelling has gone down.  He overall is in his usual state of health without any complaints.  Vitals were stable, slightly hypertensive on doppler to  92/0.  Labs showed BNP of 521, stable renal function with BUN/Cr ratio of 12/1.4, INR of 3.1.  CRP of 69.  He was admitted to \[facility]"" with concern for R groin hematoma, CT surgery was consulted for evaluation.      * No surgery found *     Hospital Course: The patient was admitted due to abnormal CT findings of an enlarging mass in his R groin near his surgical site from his pseudoaneurysm.  Upon arrival he felt well, did not have any complaints of pain.  On physical exam he had a firm indurated area near his R groin that appeared to be a hematoma without active bleeding.  Vascular surgery was consulted for evaluation and after review of the imaging determined no need for urgent surgical intervention, with plan for close follow up in clinic in 1 week.  Ultrasound performed showed no abscess.  Of note, while he was admitted he had high doppler pressures that were most likely related to elevated fluid status.  He was given two doses of lasix 80mg IV with good diuresis.  Plan was for initiation of lasix 40mg po daily for maintenance of euvolemia.        Goals of Care Treatment Preferences:  Code Status: Full Code    Health care agent: Wife is [email or web address].Marie Mendoza  [facility] care agent number: No value filed.                   Consults (From admission, onward)        Status Ordering Provider     Inpatient consult to Vascular Surgery  Once        Provider:  (Not yet assigned)    Completed AMBER, MIKEL     Inpatient consult to Cardiothoracic Surgery  Once        Provider:  (Not yet assigned)    Completed AMBER, MIKEL     Inpatient consult to Midline team  Once        Provider:  (Not yet assigned)    Completed POPPY DAVIS     Inpatient consult to Registered Dietitian/Nutritionist  Once        Provider:  (Not yet assigned)    Acknowledged AMBER, MIKEL     Inpatient consult to Infectious Diseases  Once        Provider:  (Not yet assigned)    Completed AMBER, MIKEL          Significant Diagnostic Studies: Labs:   BMP:   Recent  Labs   Lab 01/28/23  0413 01/29/23  0503   GLU 78 94    136   K 3.7 4.0    99   CO2 24 24   BUN 11 12   CREATININE 1.5* 1.5*   CALCIUM 9.2 9.8   MG 2.0 2.0   , CMP   Recent Labs   Lab 01/28/23  0413 01/29/23  0503    136   K 3.7 4.0    99   CO2 24 24   GLU 78 94   BUN 11 12   CREATININE 1.5* 1.5*   CALCIUM 9.2 9.8   ANIONGAP 9 13   , CBC   Recent Labs   Lab 01/28/23  0413 01/29/23  0503   WBC 5.61 5.31   HGB 9.3* 10.2*   HCT 32.2* 34.8*    414    and All labs within the past 24 hours have been reviewed  Final Active Diagnoses:    Diagnosis Date Noted POA    Pseudoaneurysm of right femoral artery [I72.4] 09/24/2022 Yes    VT (ventricular tachycardia) [I47.20] 10/12/2019 Yes    LVAD (left ventricular assist device) present [Z95.811] 06/15/2018 Not Applicable    Hypertension [I10] 03/27/2018 Yes     Chronic      Problems Resolved During this Admission:      Discharged Condition: stable    Disposition:     Follow Up:    Patient Instructions:   No discharge procedures on file.  Medications:  Reconciled Home Medications:      Medication List      START taking these medications    furosemide 40 MG tablet  Commonly known as: LASIX  Take 1 tablet (40 mg total) by mouth once daily.  Start taking on: January 30, 2023        CONTINUE taking these medications    ALPRAZolam 1 MG tablet  Commonly known as: XANAX  Take 0.5-1 tablets (0.5-1 mg total) by mouth 2 (two) times daily as needed for Anxiety.     amiodarone 200 MG Tab  Commonly known as: PACERONE  Take 2 tablets (400 mg total) by mouth once daily.     ferrous gluconate 324 MG tablet  Commonly known as: FERGON  Take 1 tablet (324 mg total) by mouth 2 (two) times daily with meals.     levothyroxine 88 MCG tablet  Commonly known as: SYNTHROID  Take 1 tablet (88 mcg total) by mouth before breakfast.     magnesium oxide 400 mg (241.3 mg magnesium) tablet  Commonly known as: MAG-OX  Take 1 tablet (400 mg total) by mouth 2 (two) times daily.      mexiletine 250 MG Cap  Commonly known as: MEXITIL  Take 1 capsule (250 mg total) by mouth every 8 (eight) hours.     mirtazapine 30 MG tablet  Commonly known as: REMERON  Take 1 tablet (30 mg total) by mouth every evening.     omega-3 acid ethyl esters 1 gram capsule  Commonly known as: LOVAZA  Take 2 capsules (2 g total) by mouth 2 (two) times daily.     pantoprazole 40 MG tablet  Commonly known as: PROTONIX  Take 1 tablet (40 mg total) by mouth daily with lunch.     sodium chloride 0.9% SolP 100 mL with ertapenem 1 gram SolR 1 g  Inject 1 g into the vein once daily.     voriconazole 200 MG Tab  Commonly known as: VFEND  Take 1 tablet (200 mg total) by mouth 2 (two) times daily.     warfarin 5 MG tablet  Commonly known as: COUMADIN  Take a half tablet (2.5mg) by mouth on 10/5 only. Then take 1 tablet (5mg) daily            Nic Torres MD  Heart Transplant  [facility] - Cardiology Stepdown

## 2023-01-29 NOTE — PROGRESS NOTES
[facility]  Heart Transplant  Progress Note    Patient Name: Tim Richards  MRN: [id number]  Admission Date: 1/26/2023  Hospital Length of Stay: 3 days  Attending Physician: Isaiah Santizo MD  Primary Care Provider: Diego Daniel MD  Principal Problem:<principal problem not specified>    Subjective:     Interval History: No acute events overnight. Patient feels well overall.  No alarms upon interrogation of LVAD and patient ambulating well.      Patient was evaluated by vascular surgery, no acute intervention.  Ultrasound performed today that confirms hematoma.  Discussed with vascular surgery who will follow up with patient in 1 week, plan for discharge today.      Continuous Infusions:  Scheduled Meds:   amiodarone  400 mg Oral Daily    amLODIPine  5 mg Oral Daily    furosemide  40 mg Oral Daily    levothyroxine  88 mcg Oral Before breakfast    magnesium oxide  400 mg Oral BID    mexiletine  250 mg Oral Q8H    mirtazapine  30 mg Oral QHS    pantoprazole  40 mg Oral Daily with lunch    voriconazole  200 mg Oral BID     PRN Meds:ALPRAZolam, sodium chloride 0.9%    Review of patient's allergies indicates:   Allergen Reactions    Lisinopril Anaphylaxis    Hydralazine     Hydralazine analogues      Chronic constipation, impotence, dizziness     Objective:     Vital Signs (Most Recent):  Temp: 98.3 °F (36.8 °C) (01/29/23 1155)  Pulse: 72 (01/29/23 1155)  Resp: 18 (01/29/23 1155)  BP: 97/64 (01/29/23 1158)  SpO2: 95 % (01/29/23 1155) Vital Signs (24h Range):  Temp:  [97.4 °F (36.3 °C)-98.6 °F (37 °C)] 98.3 °F (36.8 °C)  Pulse:  [30-76] 72  Resp:  [17-18] 18  SpO2:  [94 %-97 %] 95 %  BP: ()/(0-78) 97/64     Patient Vitals for the past 72 hrs (Last 3 readings):   Weight   01/29/23 0719 90.9 kg (200 lb 6.4 oz)   01/28/23 0829 92.6 kg (204 lb 2.3 oz)   01/27/23 1003 96.6 kg (213 lb)     Body mass index is 26.44 kg/m².      Intake/Output Summary (Last 24 hours) at 1/29/2023  1334  Last data filed at 1/29/2023 1200  Gross per 24 hour   Intake 702 ml   Output 1300 ml   Net -598 ml     Physical Exam  Constitutional:       Appearance: Normal appearance.   HENT:      Head: Atraumatic.      Mouth/Throat:      Mouth: Mucous membranes are moist.      Pharynx: Oropharynx is clear.   Cardiovascular:      Rate and Rhythm: Normal rate and regular rhythm.      Pulses: Normal pulses.      Heart sounds: Normal heart sounds.      Comments: VAD hum can be heard, palpable R femoral pulse, no bruit, firm region around R inguinal site  Pulmonary:      Effort: Pulmonary effort is normal.      Breath sounds: Normal breath sounds.   Musculoskeletal:         General: Normal range of motion.      Cervical back: Normal range of motion and neck supple.   Skin:     Capillary Refill: Capillary refill takes 2 to 3 seconds.   Neurological:      General: No focal deficit present.      Mental Status: He is alert and oriented to person, place, and time.       Significant Labs:  CBC:  Recent Labs   Lab 01/27/23 1112 01/28/23 0413 01/29/23  0503   WBC 5.56 5.61 5.31   RBC 3.21* 3.59* 3.93*   HGB 8.3* 9.3* 10.2*   HCT 28.8* 32.2* 34.8*    408 414   MCV 90 90 89   MCH 25.9* 25.9* 26.0*   MCHC 28.8* 28.9* 29.3*     BNP:  Recent Labs   Lab 01/27/23 1112   *     CMP:  Recent Labs   Lab 01/27/23 1112 01/28/23 0413 01/29/23  0503   GLU 79 78 94   CALCIUM 8.9 9.2 9.8   ALBUMIN 2.7*  --   --    PROT 7.0  --   --     136 136   K 4.1 3.7 4.0   CO2 24 24 24    103 99   BUN 12 11 12   CREATININE 1.4 1.5* 1.5*   ALKPHOS 77  --   --    ALT 21  --   --    AST 30  --   --    BILITOT 0.3  --   --       Coagulation:   Recent Labs   Lab 01/27/23  1112 01/28/23  0413 01/29/23  0503   INR 3.1* 2.9* 2.3*   APTT  --  54.7* 51.6*     LDH:  Recent Labs   Lab 01/28/23  0413 01/29/23  0503    203     Microbiology:  Microbiology Results (last 7 days)       Procedure Component Value Units Date/Time    Aerobic  culture [[id number]] Collected: 01/29/23 1014    Order Status: Sent Specimen: Skin from Abdomen Updated: 01/29/23 1020    Culture, Anaerobe [[id number]] Collected: 01/29/23 1014    Order Status: Sent Specimen: Skin from Abdomen Updated: 01/29/23 1019            I have reviewed all pertinent labs within the past 24 hours.    Estimated Creatinine Clearance: 62.1 mL/min (A) (based on SCr of 1.5 mg/dL (H)).    Diagnostic Results:  I have reviewed all pertinent imaging results/findings within the past 24 hours.    Assessment and Plan:     Mr. Tmi Richards is a 57 year old AA male with stage D HFrEF HM3 7/13/22 (previously Heartmate II outflow graft changed on 3/8/2018) with complicated post operative course, see previous clinic note, also has VT with ICD discharges, A flutter with RVR, avascular necrosis of his femoral head, and amio induced hyperthyroidism requiring methimazole.  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  New growth of aspergillus flavus post discharge from his surgical site (10/31/22), was discharged with plan for ertapenem and voriconazole.  Ertapenem stopped on 1/6/23 and remained on the voriconazole.      He is seen by plastics and CTS who had no plans for follow up.  He received a routine CT abdomen to evaluate a ventral hernia which incidentally showed a mixed collection in the R inguinal region (10 cm x 6.1 cm) concerning for hematoma, also another simple fluid collection in the inferolateral aspect measuring 3.3 x 3.8 cm favoring seroma vs lymphocele.  He was advised to come into the hospital due to these findings.      Upon arrival he was feeling well, had no complaints of shortness of breath, chest pain, fevers, dizziness, lightheadedness, palpitations.  Denied any alarms on his LVAD.  When asked about his swelling he reports that he had a mechanical fall about 3 weeks  ago and since that time the swelling has gone down.  He overall is in his usual state of health without any complaints.  Vitals were stable, slightly hypertensive on doppler to 92/0.  Labs showed BNP of 521, stable renal function with BUN/Cr ratio of 12/1.4, INR of 3.1.  CRP of 69.  He was admitted to [facility] with concern for R groin hematoma, CT surgery was consulted for evaluation.      Pseudoaneurysm of right femoral artery  -Va ECMO decannulated on 7/19/22  -History of cultures positive for ESBL ecoli, ecoli, proteus mirabilis, and aspergillus flavus (see micro tab)  -Was previously on ertapenem and voriconazole, ertapenem stopped on 1/6/23.  Remains on voriconazole  -CT abdomen obtained for evaluation of ventral hernia but incidentally noted mass in R groin concerning for hematoma as well as inferolateral mass for lymphocele vs seroma  -low suspicion for infection at this time, consulted vascular surgery for evaluation.   -recommendation by vascular surgery for no inpatient surgical intervention  -Will follow up with vascular surgery in clinic in 1 week      VT (ventricular tachycardia)  Continue amiodarone 400mg daily for VT prophylaxis  ICD present    LVAD (left ventricular assist device) present  Procedure: Device Interrogation Including analysis of device parameters  Current Settings: Ventricular Assist Device  Review of device function is stable  Goal INR of 2.0-2.5, INR currently 2.9. Pharmacy to assist with management, will hold coumadin tonight    TXP LVAD INTERROGATIONS 1/29/2023 1/29/2023 1/29/2023 1/28/2023 1/28/2023 1/28/2023 1/28/2023   Type HeartMate3 HeartMate3 HeartMate3 HeartMate3 HeartMate3 HeartMate3 HeartMate3   Flow 5.6 5.8 5.6 5.6 5.6 5.6 5.7   Speed 6400 6450 6400 6400 6400 6400 6400   PI 2.0 1.5 1.5 1.6 1.4 2.1 1.9   Power ([place]) 5.6 5.7 5.6 5.6 5.6 5.6 5.6   LSL 6000 6000 6000 6000 6000 6000 6000   Low Flow Alarm - - - - - - -   High Power Alarm - - - - - - -   Pulsatility Pulse -  Intermittent pulse Intermittent pulse Pulse Pulse Pulse       Hypertension  Not currently on any antihypertensives  Will maintain MAP beetween 65-85  Likely related to volume overload, will give intermittent diuresis and monitor urine output and vitals  Patient with reported upper airway swelling with hydralazine, also does not tolerate lisinopril  Will discontinue amlodipine, high MAPs related to volume issues.  Will plan for discharge on maintenance diuretics at lasix 40mg po daily    Plan for discharge today as no surgical intervention planned by vascular surgery, follow up with vascular surgery in 1 week as scheduled.  Will continue coumadin as outpatient and follow in anticoagulation clinic.      Nic Torres MD  Heart Transplant  [facility] - Cardiology Stepdown

## 2023-01-29 NOTE — PLAN OF CARE
UA sample collected during the shift. LVAD number and doppler WNL now. LVAD dressing changed per protocol ( see note). Urine output 3150 mL during the shift. Pt educated on fall risk and remained free from falls/trauma/injury. Denies chest pain, SOB, palpitations, dizziness, pain, or discomfort. Plan of care reviewed with pt, all questions answered. Bed locked in lowest position, call bell within reach, no acute distress noted, will continue to monitor.

## 2023-01-29 NOTE — NURSING
Notified MD. Torres pt refused lasix tablet 80 mg, and MD. Torres talked to pt and ok to hold now.

## 2023-01-29 NOTE — PROGRESS NOTES
01/28/2023  Fitz Christianson    Current provider:  Isaiah Santizo MD    Device interrogation:  TXP LVAD INTERROGATIONS 1/28/2023 1/28/2023 1/28/2023 1/28/2023 1/28/2023 1/28/2023 1/27/2023   Type HeartMate3 HeartMate3 HeartMate3 HeartMate3 HeartMate3 HeartMate3 HeartMate3   Flow 5.6 5.6 5.6 5.7 5.7 5.6 5.7   Speed 6400 6400 6400 6400 6450 6400 6400   PI 1.6 1.4 2.1 1.9 1.8 1.8 1.3   Power ([place]) 5.6 5.6 5.6 5.6 5.6 5.6 5.5   LSL 6000 6000 6000 6000 600 6000 6000   Low Flow Alarm - - - - - - -   High Power Alarm - - - - - - -   Pulsatility Intermittent pulse Pulse Pulse Pulse - Intermittent pulse Intermittent pulse          Rounded on Tim Richards to ensure all mechanical assist device settings (IABP or VAD) were appropriate and all parameters were within limits.  I was able to ensure all back up equipment was present, the staff had no issues, and the Perfusion Department daily rounding was complete.      For implantable VADs: Interrogation of Ventricular assist device was performed with analysis of device parameters and review of device function. I have personally reviewed the interrogation findings and agree with findings as stated.     In emergency, the nursing units have been notified to contact the perfusion department either by:  Calling [id number] from 630am to 4pm Mon thru Fri, utilizing the On-Call Finder functionality of Epic and searching for Perfusion, or by contacting the hospital  from 4pm to 630am and on weekends and asking to speak with the perfusionist on call.    10:42 PM

## 2023-01-29 NOTE — PROGRESS NOTES
[facility]  Vascular Surgery  Progress Note    Patient Name: Tim Richards  MRN: [id number]  Admission Date: 1/26/2023  Primary Care Provider: Diego Dainel MD    Subjective:     Interval History:   NAEOn  AFVSS  Exam of right groin stable  Dressing with minimal SS drainage    Post-Op Info:  * No surgery found *           Medications:  Continuous Infusions:  Scheduled Meds:   amiodarone  400 mg Oral Daily    amLODIPine  5 mg Oral Daily    furosemide  80 mg Oral BID    levothyroxine  88 mcg Oral Before breakfast    magnesium oxide  400 mg Oral BID    mexiletine  250 mg Oral Q8H    mirtazapine  30 mg Oral QHS    pantoprazole  40 mg Oral Daily with lunch    voriconazole  200 mg Oral BID     PRN Meds:ALPRAZolam, sodium chloride 0.9%     Objective:     Vital Signs (Most Recent):  Temp: 98.1 °F (36.7 °C) (01/29/23 0750)  Pulse: 69 (01/29/23 0750)  Resp: 18 (01/29/23 0750)  BP: 105/73 (01/29/23 0755)  SpO2: 96 % (01/29/23 0750) Vital Signs (24h Range):  Temp:  [97.4 °F (36.3 °C)-98.6 °F (37 °C)] 98.1 °F (36.7 °C)  Pulse:  [30-76] 69  Resp:  [17-18] 18  SpO2:  [94 %-97 %] 96 %  BP: ()/(0-78) 105/73     Date 01/29/23 0700 - 01/30/23 0659   Shift 0090-0146 8508-6240 3967-0239 24 Hour Total   INTAKE   Shift Total(mL/kg)       OUTPUT   Urine(mL/kg/hr) 400   400   Shift Total(mL/kg) 400(4.4)   400(4.4)   Weight (kg) 90.9 90.9 90.9 90.9       Physical Exam  Constitutional:       General: He is not in acute distress.     Appearance: Normal appearance.   HENT:      Head: Normocephalic and atraumatic.      Mouth/Throat:      Mouth: Mucous membranes are moist.   Eyes:      Pupils: Pupils are equal, round, and reactive to light.   Cardiovascular:      Rate and Rhythm: Normal rate.   Pulmonary:      Effort: Pulmonary effort is normal. No respiratory distress.   Abdominal:      General: Abdomen is flat. There is no distension.      Palpations: Abdomen is soft.   Musculoskeletal:         General:  Normal range of motion.      Cervical back: Normal range of motion.      Comments: R groin with moderate swelling and area of ulceration w/ some SS drainage on dressing.   Skin:     General: Skin is warm and dry.   Neurological:      General: No focal deficit present.      Mental Status: He is alert and oriented to person, place, and time.   Psychiatric:         Mood and Affect: Mood normal.         Behavior: Behavior normal.       Significant Labs:  All pertinent labs from the last 24 hours have been reviewed.    Significant Diagnostics:  I have reviewed all pertinent imaging results/findings within the past 24 hours.    Assessment/Plan:     Pseudoaneurysm of right femoral artery  Patient is a 57y M w/ complex cardiac medical history that including cannulation site at the R femoral artery resulting in pseudoaneurysm that was repaired with vascular surgery and covered with flap from plastic surgery in October of 2022. Fall earlier this week resulting in significant swelling in the right groin that has been improving over the past days. Small area of skin ulceration overlying the fluid collection with SS drainage.     - CTA A/P with RLE runoff after renal function optimization  - Abx per ID  - Plastic Surgery consultation  - Evaluation by IR for drainage of possible abscess  - R groin US to eval fluid collection characteristics  - Cont wound care. Change dressing daily.      We will continue to follow        Nani Davis MD  Vascular Surgery  [facility] - Cardiology Stepdown

## 2023-01-29 NOTE — PLAN OF CARE
Hold DC for now. Unable to get accurate urine output d/t pt use toilet once. LVAD number and doppler WNL. LVAD dressing changed per protocol ( see note). Pt educated on fall risk and remained free from falls/trauma/injury. Denies chest pain, SOB, palpitations, dizziness, pain, or discomfort. Plan of care reviewed with pt, all questions answered. Bed locked in lowest position, call bell within reach, no acute distress noted, will continue to monitor.

## 2023-01-29 NOTE — ASSESSMENT & PLAN NOTE
LACTATION CONSULT NOTE      Sonia Duckworth is a 36 year old  female at 39w3d    [id number]  : 3/30/1985   36 year old     39w3d    Problem List:  There is no problem list on file for this patient.        History:   Primary Medical:  History reviewed. No pertinent past medical history.  Primary Surgical:  History reviewed. No pertinent surgical history.    SOCIAL HISTORY:   Social:    Social History     Tobacco Use   • Smoking status: Never Smoker   • Smokeless tobacco: Never Used   Substance Use Topics   • Alcohol use: Not Currently     Sexual:    Sexually Active: Not Asked        Drug:    Drug Use:    Not Current*    Employment:  Review of patient's social economics indicates:  No social economics status on file      Current Facility-Administered Medications   Medication Dose Route Frequency Provider Last Rate Last Admin   • naLOXone (NARCAN) injection 0.1 mg  0.1 mg Intravenous PRN Gerda Bermeo MD       • oxytocin (PITOCIN) 30 Units in sodium chloride 0.9% 500 mL  0-334 mL/hr Intravenous Continuous Arianna [place]       • nalbuphine (NUBAIN) injection 2.5 mg  2.5 mg Intravenous Q2H PRN Arianna Daniele       • [START ON 2021] PRENATAL vitamin & mineral w/ folic acid 1 mg tablet 1 tablet  1 tablet Oral Daily Arianna Daniele       • benzocaine/menthol (DERMOPLAST) 20-0.5 % topical spray 1 spray  1 spray Topical Q1H PRN Arianna [place]       • hydroCORTisone (ANUSOL-HC) suppository 25 mg  25 mg Rectal Q8H PRN Arianna Daniele       • simethicone (MYLICON) tablet 125 mg  125 mg Oral 4x Daily PRN Arianna Daniele       • calcium carbonate (TUMS) chewable tablet 500 mg  500 mg Oral Q4H PRN Arianna [place]       • ondansetron (ZOFRAN) injection 4 mg  4 mg Intravenous Q12H PRN Arianna Daniele       • prochlorperazine (COMPAZINE) tablet 5 mg  5 mg Oral Q4H PRN Arianna [place]       • prochlorperazine (COMPAZINE) injection 5 mg  5 mg Intravenous Q4H PRN Arianna Daniele       • docusate sodium-sennosides (SENOKOT S)  Patient is a 57y M w/ complex cardiac medical history that including cannulation site at the R femoral artery resulting in pseudoaneurysm that was repaired with vascular surgery and covered with flap from plastic surgery in October of 2022. Fall earlier this week resulting in significant swelling in the right groin that has been improving over the past days. Small area of skin ulceration overlying the fluid collection with SS drainage.     - CTA A/P with RLE runoff after renal function optimization  - Abx per ID  - Plastic Surgery consultation  - Evaluation by IR for drainage of possible abscess  - R groin US to eval fluid collection characteristics  - Cont wound care. Change dressing daily.      We will continue to follow   50-8.6 MG 2 tablet  2 tablet Oral Daily Arianna Chula   2 tablet at 12/15/21 1328   • bisacodyl (DULCOLAX) suppository 10 mg  10 mg Rectal Daily PRN Arianna Daniele       • magnesium hydroxide (MILK OF MAGNESIA) 400 MG/5ML suspension 30 mL  2,400 mg Oral Daily PRN Arianna Daniele       • polyethylene glycol (MIRALAX) packet 17 g  17 g Oral Daily PRN Arianna Chula       • lactated ringers infusion   Intravenous Continuous Arianna [place] 125 mL/hr at 12/15/21 1430 New Bag at 12/15/21 1430   • lidocaine (LIDOCARE) 4 % patch 1 patch  1 patch Transdermal Daily Arianna [place]ula   1 patch at 12/15/21 1329   • senna (SENOKOT) 8.6 mg  1 tablet Oral Nightly Arianna [place]ula       • nalbuphine (NUBAIN) injection 2.5 mg  2.5 mg Intravenous Q2H PRN Arianna [place]ula       • acetaminophen-codeine (TYLENOL NO.3) 300-30 MG per tablet 2 tablet  2 tablet Oral Q6H Cherelle Shivashankar   2 tablet at 12/15/21 1326   • oxyCODONE (IMM REL) (ROXICODONE) tablet 5 mg  5 mg Oral Q6H PRN Cherelle Shivashankar       • diphenhydrAMINE (BENADRYL) injection 25 mg  25 mg Intravenous Q6H PRN Cherelle Shivashankar   25 mg at 12/15/21 1346   • DIPHENHYDRAMINE HCL 50 MG/ML IJ SOLN Pyxis Override                Route of delivery: , Low Transverse [251] .    Infant is latching well.Experienced breastfeeding mother.  Instructed to breastfeed every 2-3 hours upon demand.  Feeding cues reviewed.  Encouraged to ask for latch assessment for next feeding if needed.

## 2023-01-30 ENCOUNTER — PATIENT OUTREACH (OUTPATIENT)
Dept: ADMINISTRATIVE | Facility: CLINIC | Age: 58
End: 2023-01-30
Payer: MEDICARE

## 2023-01-30 NOTE — NURSING
Notified MD. Torres after 500 mL NS bolus, pt said he feel better, walked with RN in the egan, c/o still feel little dizzy, but still feel ok to go home, and MD. Torres is ok to DC pt.

## 2023-01-30 NOTE — PROGRESS NOTES
Patient admitted for hematoma of groin 1/26/23 - 1/29/23    Per note, Patient admitted due to CT finding an enlarged mass to right groin found to be a hematoma without active bleeding.  Vascular surgery consulted and no need for surgical intervention and follow up in one week.  Abscess rule out per ultrasound.  He Had high doppler pressures that were related to elevated fluid status, diuresed IV lasix and discharged on PO Lasix.     No Coumadin given during admit    Coumadin per hospital discharge 5 mg daily which is clinic's maintenance plan dose, started on Lasix.    I left voice message for patient to call back to confirm 1/29/23  doses of Coumadin taken and for him to confirm hospital discharge dosing of Coumadin.  Patient previously scheduled for 2/1/23 [facility] appointment prior to being admitted.    UPDATE:  Kelly/wife verified patient took Coumadin 5 mg on 1/29/23 and confirmed hospital discharge Couamdin dosing 5 mg daily.  She was instructed next INR due at [facility] 2/1/23 which she verbalized understanding.

## 2023-01-30 NOTE — PROGRESS NOTES
C3 nurse attempted to contact patient for a TCC post hospital discharge follow-up call. The patient declined call at this time.

## 2023-01-30 NOTE — PLAN OF CARE
LVAD inventory done. Pt given discharge instruction, medication reviewed with the pt, follow up appts reviewed. All questions and concerns addressed. Pt verbalized understanding. IV, telemetry removed. Discharge paperwork given to patient. Pt in room waiting for transport.

## 2023-01-30 NOTE — NURSING
Pt aox4,   AVS and discharge pkt explained and handed to pt\.  Pt verbalizes understanding of f/u out pt for right groin.   IV removed.  Pt in no apparent distress.  Wheeled to pickup by transport.

## 2023-01-31 ENCOUNTER — DOCUMENT SCAN (OUTPATIENT)
Dept: HOME HEALTH SERVICES | Facility: HOSPITAL | Age: 58
End: 2023-01-31
Payer: MEDICARE

## 2023-01-31 PROCEDURE — 99499 UNLISTED E&M SERVICE: CPT | Mod: ,,, | Performed by: INTERNAL MEDICINE

## 2023-01-31 PROCEDURE — 99499 NO LOS: ICD-10-PCS | Mod: ,,, | Performed by: INTERNAL MEDICINE

## 2023-02-01 ENCOUNTER — ANTI-COAG VISIT (OUTPATIENT)
Dept: CARDIOLOGY | Facility: CLINIC | Age: 58
End: 2023-02-01
Payer: MEDICARE

## 2023-02-01 ENCOUNTER — TELEPHONE (OUTPATIENT)
Dept: ELECTROPHYSIOLOGY | Facility: CLINIC | Age: 58
End: 2023-02-01
Payer: MEDICARE

## 2023-02-01 ENCOUNTER — DOCUMENT SCAN (OUTPATIENT)
Dept: HOME HEALTH SERVICES | Facility: HOSPITAL | Age: 58
End: 2023-02-01
Payer: MEDICARE

## 2023-02-01 ENCOUNTER — OFFICE VISIT (OUTPATIENT)
Dept: INFECTIOUS DISEASES | Facility: CLINIC | Age: 58
End: 2023-02-01
Payer: MEDICARE

## 2023-02-01 ENCOUNTER — LAB VISIT (OUTPATIENT)
Dept: LAB | Facility: HOSPITAL | Age: 58
End: 2023-02-01
Attending: INTERNAL MEDICINE
Payer: MEDICARE

## 2023-02-01 VITALS — HEIGHT: 73 IN | BODY MASS INDEX: 27.96 KG/M2 | WEIGHT: 211 LBS

## 2023-02-01 DIAGNOSIS — B44.89: Primary | ICD-10-CM

## 2023-02-01 DIAGNOSIS — I72.4 PSEUDOANEURYSM OF RIGHT FEMORAL ARTERY: ICD-10-CM

## 2023-02-01 DIAGNOSIS — T81.89XA SWELLING OF SURGICAL SITE, INITIAL ENCOUNTER: ICD-10-CM

## 2023-02-01 DIAGNOSIS — E03.2 HYPOTHYROIDISM DUE TO AMIODARONE: ICD-10-CM

## 2023-02-01 DIAGNOSIS — Z79.2 ANTIBIOTIC LONG-TERM USE: ICD-10-CM

## 2023-02-01 DIAGNOSIS — R60.9 SWELLING OF SURGICAL SITE, INITIAL ENCOUNTER: ICD-10-CM

## 2023-02-01 DIAGNOSIS — T46.2X1A HYPOTHYROIDISM DUE TO AMIODARONE: ICD-10-CM

## 2023-02-01 LAB
BACTERIA SPEC AEROBE CULT: NO GROWTH
T4 FREE SERPL-MCNC: 1.42 NG/DL (ref 0.71–1.51)
TSH SERPL DL<=0.005 MIU/L-ACNC: 11.91 UIU/ML (ref 0.4–4)

## 2023-02-01 PROCEDURE — 3008F BODY MASS INDEX DOCD: CPT | Mod: CPTII,S$GLB,, | Performed by: STUDENT IN AN ORGANIZED HEALTH CARE EDUCATION/TRAINING PROGRAM

## 2023-02-01 PROCEDURE — 93793 ANTICOAG MGMT PT WARFARIN: CPT | Mod: S$GLB,,,

## 2023-02-01 PROCEDURE — 1159F PR MEDICATION LIST DOCUMENTED IN MEDICAL RECORD: ICD-10-PCS | Mod: CPTII,S$GLB,, | Performed by: STUDENT IN AN ORGANIZED HEALTH CARE EDUCATION/TRAINING PROGRAM

## 2023-02-01 PROCEDURE — 84439 ASSAY OF FREE THYROXINE: CPT | Performed by: INTERNAL MEDICINE

## 2023-02-01 PROCEDURE — 84443 ASSAY THYROID STIM HORMONE: CPT | Performed by: INTERNAL MEDICINE

## 2023-02-01 PROCEDURE — 99213 PR OFFICE/OUTPT VISIT, EST, LEVL III, 20-29 MIN: ICD-10-PCS | Mod: S$GLB,,, | Performed by: STUDENT IN AN ORGANIZED HEALTH CARE EDUCATION/TRAINING PROGRAM

## 2023-02-01 PROCEDURE — 99499 NO LOS: ICD-10-PCS | Mod: ,,, | Performed by: INTERNAL MEDICINE

## 2023-02-01 PROCEDURE — 99999 PR PBB SHADOW E&M-EST. PATIENT-LVL III: ICD-10-PCS | Mod: PBBFAC,,,

## 2023-02-01 PROCEDURE — 1111F PR DISCHARGE MEDS RECONCILED W/ CURRENT OUTPATIENT MED LIST: ICD-10-PCS | Mod: CPTII,S$GLB,, | Performed by: STUDENT IN AN ORGANIZED HEALTH CARE EDUCATION/TRAINING PROGRAM

## 2023-02-01 PROCEDURE — 3008F PR BODY MASS INDEX (BMI) DOCUMENTED: ICD-10-PCS | Mod: CPTII,S$GLB,, | Performed by: STUDENT IN AN ORGANIZED HEALTH CARE EDUCATION/TRAINING PROGRAM

## 2023-02-01 PROCEDURE — 1111F DSCHRG MED/CURRENT MED MERGE: CPT | Mod: CPTII,S$GLB,, | Performed by: STUDENT IN AN ORGANIZED HEALTH CARE EDUCATION/TRAINING PROGRAM

## 2023-02-01 PROCEDURE — 1159F MED LIST DOCD IN RCRD: CPT | Mod: CPTII,S$GLB,, | Performed by: STUDENT IN AN ORGANIZED HEALTH CARE EDUCATION/TRAINING PROGRAM

## 2023-02-01 PROCEDURE — 93793 PR ANTICOAGULANT MGMT FOR PT TAKING WARFARIN: ICD-10-PCS | Mod: S$GLB,,,

## 2023-02-01 PROCEDURE — 99213 OFFICE O/P EST LOW 20 MIN: CPT | Mod: S$GLB,,, | Performed by: STUDENT IN AN ORGANIZED HEALTH CARE EDUCATION/TRAINING PROGRAM

## 2023-02-01 PROCEDURE — 99499 UNLISTED E&M SERVICE: CPT | Mod: ,,, | Performed by: INTERNAL MEDICINE

## 2023-02-01 PROCEDURE — 1160F RVW MEDS BY RX/DR IN RCRD: CPT | Mod: CPTII,S$GLB,, | Performed by: STUDENT IN AN ORGANIZED HEALTH CARE EDUCATION/TRAINING PROGRAM

## 2023-02-01 PROCEDURE — 1160F PR REVIEW ALL MEDS BY PRESCRIBER/CLIN PHARMACIST DOCUMENTED: ICD-10-PCS | Mod: CPTII,S$GLB,, | Performed by: STUDENT IN AN ORGANIZED HEALTH CARE EDUCATION/TRAINING PROGRAM

## 2023-02-01 PROCEDURE — 99999 PR PBB SHADOW E&M-EST. PATIENT-LVL III: CPT | Mod: PBBFAC,,,

## 2023-02-01 NOTE — TELEPHONE ENCOUNTER
----- Message from Saray Heller sent at 2/1/2023  3:02 PM CST -----  Regarding: appt's  Pt could not make his appt's b/c he just go out of the hospital.  He is scheduled for 2/8/23 for a virtual and his wife Kelly [phone] wants to know if you can use the ekg he had in the hospital?    Thank you

## 2023-02-01 NOTE — PROGRESS NOTES
Patient reports correct Warfarin dose, stated he as admitted to [facility] on 1/26/23 for a fall and didn't take Warfarin from 1/26/23-1/28/23, had Ensure 1/30/23, no other changes noted.

## 2023-02-01 NOTE — TELEPHONE ENCOUNTER
Spoke with pt's wife in regards to message below. Device check and EKG have been rescheduled. Pt's wife verbally confirmed appointment date, time, and location, and thanked me for calling.

## 2023-02-01 NOTE — PROGRESS NOTES
INFECTIOUS DISEASE CLINIC  02/01/2023     Subjective:      Chief Complaint:   Chief Complaint   Patient presents with    Follow-up         History of Present Illness:    This is a 57 y.o. 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  Patient known to ID, please see prior notes for full details. Since discharge from the hospital pt states he has been doing ok. Missed/canceled a few ID follow up appts due to transportation issues. Saw vasc surgery recently with wound vac removed. Pt reports tolerating abx without issues and denies problems with PICC. Denies fevers or chills, though states he is seeing some drainage from his prior R groin surgical site.       Interval history:   11/23/22: Since last seen, he states he has been doing ok - noticed more swelling of his R groin. No drainage or pain noted. Tolerating erta without issues. Saw plastics today - attempted to drain fluid collection per patient, unable to aspirate. Reports taking vori, denies adverse reactions.   1/6/23: Doing well since last seen in clinic - briefly admitted in the fall; cx data obtained at that time unrevealing. US of R groin with cystic structure (lymphocele vs seroma) without communication with prior graft. Pt reports tolerating abx/vori without issues. Reports R groin swelling is better, less swollen and with minimal dc. Denies problems with PICC. Reports abdominal swelling when supine; nonpainful. Pt received a letter from insurance company that vori may not be covered.   2/1/23: Recently admitted for R groin swelling after a mechanical fall - noted to have a hematoma, no acute surgical intervention per surgical  team. Pt reports swelling has improved since discharge from the hospital. He had DLES cx obtained during his admission - ngtd. Tolerating voriconazole without issues. No fevers or chills.       Review of Systems   Constitutional: Negative for chills and fever.   Skin:  Positive for poor wound healing.   All other systems reviewed and are negative.      Past Medical History:   Diagnosis Date    A-fib     Anticoagulant long-term use     Atrial flutter 7/30/2022    CHF (congestive heart failure)     Class 1 obesity due to excess calories with serious comorbidity and body mass index (BMI) of 31.0 to 31.9 in adult     Class 1 obesity due to excess calories with serious comorbidity and body mass index (BMI) of 32.0 to 32.9 in adult     Dilated cardiomyopathy 1/10/2018    Disorder of kidney and ureter     CKD    Encounter for blood transfusion     Essential hypertension 8/28/2022    Gout     HTN (hypertension)     Hx of psychiatric care     ICD (implantable cardioverter-defibrillator) infection 7/1/2020    Psychiatric problem     Thyroid disease     Ventricular tachycardia (paroxysmal)      Past Surgical History:   Procedure Laterality Date    AORTIC VALVULOPLASTY N/A 7/13/2022    Procedure: REPAIR, AORTIC VALVE;  Surgeon: Yg Kaufman MD;  Location: [address];  Service: Cardiovascular;  Laterality: N/A;    APPLICATION OF WOUND VACUUM-ASSISTED CLOSURE DEVICE N/A 7/15/2022    Procedure: APPLICATION, WOUND VAC;  Surgeon: Yg Kaufman MD;  Location: [facility] OR [address] [place] [address];  Service: Cardiovascular;  Laterality: N/A;  50 x 5 cm     APPLICATION OF WOUND VACUUM-ASSISTED CLOSURE DEVICE Right 9/27/2022    Procedure: APPLICATION, WOUND VAC;  Surgeon: Kole Tabares MD;  Location: [address];  Service: Plastics;  Laterality: Right;    CARDIAC CATHETERIZATION  Dec. 2012    CARDIAC DEFIBRILLATOR PLACEMENT Left     CRRT-D    COLONOSCOPY N/A 3/6/2018    Procedure: COLONOSCOPY;  Surgeon: Alonso Bone MD;  Location:  [facility] ENDO (2ND FLR);  Service: Endoscopy;  Laterality: N/A;    COLONOSCOPY N/A 7/17/2019    Procedure: COLONOSCOPY;  Surgeon: Blane Valdez MD;  Location: [facility] ENDO (2ND FLR);  Service: Endoscopy;  Laterality: N/A;    COLONOSCOPY N/A 7/18/2019    Procedure: COLONOSCOPY;  Surgeon: Blane Valdez MD;  Location: [facility] ENDO (2ND FLR);  Service: Endoscopy;  Laterality: N/A;    CREATION OF ILIOFEMORAL ARTERY BYPASS Right 9/27/2022    Procedure: CREATION, BYPASS, ARTERIAL, ILIAC TO FEMORAL WITH GRAFT;  Surgeon: Zach Hernández MD;  Location: [facility] OR 2ND FLR;  Service: Peripheral Vascular;  Laterality: Right;    CREATION OF MUSCLE ROTATIONAL FLAP Right 9/27/2022    Procedure: CREATION, FLAP, MUSCLE ROTATION, SARTORIUS AND RECTUS FEMORIS;  Surgeon: Kole Tabares MD;  [facility];  Service: Plastics;  Laterality: Right;    ESOPHAGOGASTRODUODENOSCOPY N/A 7/17/2019    Procedure: EGD (ESOPHAGOGASTRODUODENOSCOPY);  Surgeon: Blane Valdez MD;  Location: [facility] ENDO (2ND FLR);  Service: Endoscopy;  Laterality: N/A;    ESOPHAGOGASTRODUODENOSCOPY N/A 7/18/2019    Procedure: EGD (ESOPHAGOGASTRODUODENOSCOPY);  Surgeon: Blane Valdez MD;  Location: [facility] ENDO (2ND FLR);  Service: Endoscopy;  Laterality: N/A;    FOREIGN BODY REMOVAL N/A 7/22/2022    Procedure: REMOVAL, FOREIGN BODY;  Surgeon: Yg Kaufman MD;  Location: [facility] OR [facility] FLR;  Service: Cardiovascular;  Laterality: N/A;  LVAD Heartmate 2 drive line removal    INSERTION OF GRAFT TO PERICARDIUM N/A 7/15/2022    Procedure: INSERTION, GRAFT, PERICARDIUM;  Surgeon: Yg Kaufman MD;  Location: [facility] OR [facility] FLR;  Service: Cardiovascular;  Laterality: N/A;    IRRIGATION OF MEDIASTINUM N/A 7/15/2022    Procedure: IRRIGATION, MEDIASTINUM;  Surgeon: Yg Kaufman MD;  Location: [facility] OR [facility] FLR;  Service: Cardiovascular;  Laterality: N/A;    LYSIS OF ADHESIONS  7/13/2022    Procedure: LYSIS, ADHESIONS;  Surgeon: Yg Kaufman MD;  Location: [facility] OR [address] [place] [address];  Service:  Cardiovascular;;    NONINVASIVE CARDIAC ELECTROPHYSIOLOGY STUDY N/A 10/18/2019    Procedure: CARDIAC ELECTROPHYSIOLOGY STUDY, NONINVASIVE;  Surgeon: Raz Wagner MD;  Location: [facility] EP LAB;  Service: Cardiology;  Laterality: N/A;  VT, DFTs, MDT CRTD in situ, LVAD, anes, MB, 3098    REPLACEMENT OF IMPLANTABLE CARDIOVERTER-DEFIBRILLATOR (ICD) GENERATOR N/A 3/9/2020    Procedure: REPLACEMENT, ICD GENERATOR;  Surgeon: Harry Yun MD;  Location: [facility] EP LAB;  Service: Cardiology;  Laterality: N/A;  VT, ICD Gen Change and Lead Revision, MDT, MAC, DM,3 Prep    REPLACEMENT OF LEFT VENTRICULAR ASSIST DEVICE (LVAD)  7/13/2022    Procedure: REPLACEMENT, LVAD;  Surgeon: Yg Kaufman MD;  Location: [facility] OR [address] [place] [address];  Service: Cardiovascular;;    REPLACEMENT OF PUMP N/A 7/13/2022    Procedure: REPLACEMENT, PUMP;  Surgeon: Yg Kaufman MD;  Location: [facility] OR [address] [place] [address];  Service: Cardiovascular;  Laterality: N/A;  LVAD pump exchange  EXPLANATION OF HEATMATE 2  IMPLANTATION OF HEARTMATE 3  IMPLANTATION OF 8MM CHIMNEY GRAFT TO RFA  INITIATION OF ECMO  TEMPORARY CLOSURE OF CHEST    REVISION OF IMPLANTABLE CARDIOVERTER-DEFIBRILLATOR (ICD) ELECTRODE LEAD PLACEMENT N/A 3/9/2020    Procedure: REVISION, INSERTION, ELECTRODE LEAD, ICD;  Surgeon: Harry Yun MD;  Location: [facility] EP LAB;  Service: Cardiology;  Laterality: N/A;  VT, ICD Gen Change and Lead Revision, MDT, MAC, DM,3 Prep    STERNAL WOUND CLOSURE N/A 7/14/2022    Procedure: CLOSURE, WOUND, STERNUM;  Surgeon: Yg Kaufman MD;  [facility];  Service: Cardiovascular;  Laterality: N/A;  temporary closure  evacuation of hematoma    STERNAL WOUND CLOSURE N/A 7/15/2022    Procedure: CLOSURE, WOUND, STERNUM;  Surgeon: Yg Kaufman MD;  [facility];  Service: Cardiovascular;  Laterality: N/A;    TRACHEOSTOMY N/A 8/4/2022    Procedure: CREATION, TRACHEOSTOMY;  Surgeon: Germain Holt MD;  Location: [facility] OR [address] [place] [address];  Service: General;   Laterality: N/A;    TREATMENT OF CARDIAC ARRHYTHMIA  10/18/2019    Procedure: Cardioversion or Defibrillation;  Surgeon: Raz Wagner MD;  Location: [facility] LAB;  Service: Cardiology;;     Family History   Problem Relation Age of Onset    Hypertension Father     Diabetes Father     Coronary artery disease Father     Heart disease Father         CHF    No Known Problems Mother     Cancer Sister 54        breast CA    No Known Problems Brother     Anxiety disorder Neg Hx     Depression Neg Hx     Dementia Neg Hx     Bipolar disorder Neg Hx     Suicide Neg Hx      Social History     Tobacco Use    Smoking status: Former     Packs/day: 1.00     Years: 31.00     Pack years: 31.00     Types: Cigarettes     Quit date: 2018     Years since quittin.0    Smokeless tobacco: Former    Tobacco comments:     pt is quiting on his own - pt stated not qualified for program;  pt  quit on his own   Substance Use Topics    Alcohol use: No     Alcohol/week: 0.0 standard drinks     Comment: quit    Drug use: No       Review of patient's allergies indicates:   Allergen Reactions    Lisinopril Anaphylaxis    Hydralazine     Hydralazine analogues      Chronic constipation, impotence, dizziness         Objective:   VS (24h):   There were no vitals filed for this visit.          Physical Exam  Constitutional:       General: He is not in acute distress.     Appearance: He is not ill-appearing or toxic-appearing.   HENT:      Head: Normocephalic and atraumatic.      Right Ear: External ear normal.      Left Ear: External ear normal.   Pulmonary:      Effort: Pulmonary effort is normal. No respiratory distress.   Abdominal:      General: There is no distension.      Palpations: Abdomen is soft.      Tenderness: There is no abdominal tenderness.      Comments: LVAD site dressed; contralateral prior LVAD site well healed   Skin:     General: Skin is warm and dry.      Coloration: Skin is not jaundiced.      Findings: Lesion  present.      Comments: R groin swelling present (minimal); wound healing nicely - no pus   Neurological:      Mental Status: He is alert and oriented to person, place, and time. Mental status is at baseline.      Motor: No weakness.   Psychiatric:         Mood and Affect: Mood normal.         Behavior: Behavior normal.         Immunization History   Administered Date(s) Administered    COVID-19, MRNA, LN-S, PF (MODERNA FULL 0.5 ML DOSE) 03/12/2021, 04/09/2021    COVID-19, MRNA, LN-S, PF (Pfizer) (Purple Cap) 12/04/2021    Hepatitis A / Hepatitis B 02/23/2018    Influenza 11/01/2014, 08/17/2019    Influenza - Quadrivalent - PF *Preferred* (6 months and older) 09/23/2015, 09/21/2016, 10/05/2017, 03/05/2021    Influenza - Trivalent - PF (PED) 11/01/2014    Pneumococcal Conjugate - 13 Valent 11/01/2014    Pneumococcal Polysaccharide - 23 Valent 10/01/2015, 03/24/2016    Tdap 02/23/2018         Assessment:     1. 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 Antibiotic long-term use    3. Swelling of surgical site, initial encounter    4. Pseudoaneurysm of right femoral artery           56 yo male with complex medical history including HM3 2018 with prior prolonged hospital stay that was notable for hypoxic respiratory failure 2/2 to COVID, cardiogenic shock with AV repair with redo sternotomy, explant of prior LVAD, VA ECMO (decannulated 7/19/22) with take back for mediastinal washout/hematoma, kleb aerogenes vap s/p treatment with recent admission for drainage/bleeding around prior R ecmo cannula site, found to have infected pseudoaneurysm/mycotic aneurysm (superficial wound cx with ESBL Ecoli) s/p R groin exploration with explant of infected graft, creation of ileofem bypass with rif soaked dacron graft/muscle flap (OR cx with ESBL Ecoli, Citrobacter farmeri, and PM) with growth of aspergillus flavus post-discharge from his surgical cultures - started on voriconazole on 11/9 after clinic visit. Admitted 12/2022 for R  groin swelling, found to have lymphocele/seroma that did not communicate with prior graft. He has had prior Cacnes from his prior LVAD site (suspect skin colonizer/contaminant). Completed 15w of ertapenem, prior PICC pulled. Previously discussed that there are no oral options for his prior bacterial infection. Discussed risks/benefits of erta suppressive therapy; shared decision making to stop IV abx and monitor closely. Discussed duration/mgmt of aspergillus infection - anticipate at least a year of treatment pending pt toleration.     Readmitted 1/2023 for R groin swelling after mechanical fall - DLES cx ngtd; no acute surgical intervention noted at that time and pt was discharged home. Pt reports doing well since then, states his R groin swelling has improved.       Plan:       -continue PO voriconazole   -discussed with pt that he will need to be on vori for an extended period of time, minimum 12 mo to potentially life long as long as pt is able to tolerate it  -monitor DDI with warfarin and potential adverse reactions  -pt will let me know if he is running low  -needs follow up with vascular/lvad/plastics team          Follow up in 3 mo or sooner, WB preferable per pt    Management of infected pseudoaneurysm was discussed with patient. Patient was given ample time for questions, all questions answered. Strict return precautions given to patient.       24 minutes of total time spent on the encounter, which includes face to face time and non-face to face time preparing to see the patient (eg, review of tests), Obtaining and/or reviewing separately obtained history, documenting clinical information in the electronic or other health record, independently interpreting results (not separately reported) and communicating results to the patient/family/caregiver, or care coordination (not separately reported).           Jsesica Perez MD  Infectious Disease

## 2023-02-01 NOTE — TELEPHONE ENCOUNTER
Pt did not show for Device check and EKG.  Pt is scheduled for a Virtual visit with Dr. Yun 2/8/23 @ 1120.      Called and left vm asking pt to contact clinic to let us know if he is running late.  Left msg that pt needs to be scheduled for device/ekg in lieu of virtual visit with Dr Yun next week.    Asked pt to contact clinic and left ph number

## 2023-02-02 ENCOUNTER — PATIENT MESSAGE (OUTPATIENT)
Dept: ENDOCRINOLOGY | Facility: CLINIC | Age: 58
End: 2023-02-02
Payer: MEDICARE

## 2023-02-02 NOTE — PROGRESS NOTES
01/17/18 1046   Vital Signs   Temp 97.9 °F (36.6 °C)   Temp src Oral   Pulse 78   Heart Rate Source Monitor   Resp 20   SpO2 97 %   O2 Device (Oxygen Therapy) room air   BP 98/63   MAP (mmHg) 76   BP Location Left arm   Patient Position Sitting   resume care from previous nurse, pt voice no complaints- lasix infusing at 20mg.hr by pump. Pt voices no complaints      02/02/23 1407   Inhalation Therapy Treatment   $ MDI/DPI Given MDI/DPI x 1

## 2023-02-03 ENCOUNTER — HOSPITAL ENCOUNTER (OUTPATIENT)
Dept: CARDIOLOGY | Facility: CLINIC | Age: 58
Discharge: HOME | End: 2023-02-03
Attending: INTERNAL MEDICINE
Payer: MEDICARE

## 2023-02-03 ENCOUNTER — TELEPHONE (OUTPATIENT)
Dept: TRANSPLANT | Facility: CLINIC | Age: 58
End: 2023-02-03
Payer: MEDICARE

## 2023-02-03 ENCOUNTER — CLINICAL SUPPORT (OUTPATIENT)
Dept: CARDIOLOGY | Facility: HOSPITAL | Age: 58
End: 2023-02-03
Attending: INTERNAL MEDICINE
Payer: MEDICARE

## 2023-02-03 DIAGNOSIS — I42.8 NICM (NONISCHEMIC CARDIOMYOPATHY): ICD-10-CM

## 2023-02-03 PROCEDURE — 93010 ELECTROCARDIOGRAM REPORT: CPT | Mod: S$GLB,,, | Performed by: INTERNAL MEDICINE

## 2023-02-03 PROCEDURE — 93005 RHYTHM STRIP: ICD-10-PCS | Mod: S$GLB,,, | Performed by: INTERNAL MEDICINE

## 2023-02-03 PROCEDURE — 93005 ELECTROCARDIOGRAM TRACING: CPT | Mod: S$GLB,,, | Performed by: INTERNAL MEDICINE

## 2023-02-03 PROCEDURE — 93010 RHYTHM STRIP: ICD-10-PCS | Mod: S$GLB,,, | Performed by: INTERNAL MEDICINE

## 2023-02-03 NOTE — TELEPHONE ENCOUNTER
[facility] INR physician order form faxed and scanned into the chart.

## 2023-02-06 ENCOUNTER — LAB VISIT (OUTPATIENT)
Dept: LAB | Facility: HOSPITAL | Age: 58
End: 2023-02-06
Attending: INTERNAL MEDICINE
Payer: MEDICARE

## 2023-02-06 ENCOUNTER — ANTI-COAG VISIT (OUTPATIENT)
Dept: CARDIOLOGY | Facility: CLINIC | Age: 58
End: 2023-02-06
Payer: MEDICARE

## 2023-02-06 DIAGNOSIS — Z95.811 LVAD (LEFT VENTRICULAR ASSIST DEVICE) PRESENT: ICD-10-CM

## 2023-02-06 DIAGNOSIS — Z79.01 LONG TERM (CURRENT) USE OF ANTICOAGULANTS: ICD-10-CM

## 2023-02-06 LAB
BACTERIA SPEC ANAEROBE CULT: NORMAL
INR PPP: 3 (ref 0.8–1.2)
PROTHROMBIN TIME: 29.7 SEC (ref 9–12.5)

## 2023-02-06 PROCEDURE — 93793 ANTICOAG MGMT PT WARFARIN: CPT | Mod: S$GLB,,,

## 2023-02-06 PROCEDURE — 36415 COLL VENOUS BLD VENIPUNCTURE: CPT | Performed by: INTERNAL MEDICINE

## 2023-02-06 PROCEDURE — 93793 PR ANTICOAGULANT MGMT FOR PT TAKING WARFARIN: ICD-10-PCS | Mod: S$GLB,,,

## 2023-02-06 PROCEDURE — 85610 PROTHROMBIN TIME: CPT | Performed by: INTERNAL MEDICINE

## 2023-02-08 ENCOUNTER — OFFICE VISIT (OUTPATIENT)
Dept: ELECTROPHYSIOLOGY | Facility: CLINIC | Age: 58
End: 2023-02-08
Payer: MEDICARE

## 2023-02-08 VITALS — SYSTOLIC BLOOD PRESSURE: 90 MMHG | HEART RATE: 75 BPM | DIASTOLIC BLOOD PRESSURE: 65 MMHG

## 2023-02-08 DIAGNOSIS — Z79.01 ANTICOAGULATION MONITORING, INR RANGE 2-3: ICD-10-CM

## 2023-02-08 DIAGNOSIS — I10 PRIMARY HYPERTENSION: Primary | Chronic | ICD-10-CM

## 2023-02-08 DIAGNOSIS — D69.6 THROMBOCYTOPENIA, UNSPECIFIED: ICD-10-CM

## 2023-02-08 DIAGNOSIS — T46.2X1A HYPOTHYROIDISM DUE TO AMIODARONE: ICD-10-CM

## 2023-02-08 DIAGNOSIS — N18.32 STAGE 3B CHRONIC KIDNEY DISEASE: ICD-10-CM

## 2023-02-08 DIAGNOSIS — I47.20 VT (VENTRICULAR TACHYCARDIA): ICD-10-CM

## 2023-02-08 DIAGNOSIS — Z01.818 PRE-TRANSPLANT EVALUATION FOR HEART TRANSPLANT: ICD-10-CM

## 2023-02-08 DIAGNOSIS — E03.2 HYPOTHYROIDISM DUE TO AMIODARONE: ICD-10-CM

## 2023-02-08 DIAGNOSIS — I50.42 CHRONIC COMBINED SYSTOLIC AND DIASTOLIC CONGESTIVE HEART FAILURE: ICD-10-CM

## 2023-02-08 DIAGNOSIS — R94.2 ABNORMAL RESULTS OF PULMONARY FUNCTION STUDIES: ICD-10-CM

## 2023-02-08 DIAGNOSIS — G47.33 OSA (OBSTRUCTIVE SLEEP APNEA): ICD-10-CM

## 2023-02-08 DIAGNOSIS — I42.0 DILATED CARDIOMYOPATHY: ICD-10-CM

## 2023-02-08 DIAGNOSIS — T82.9XXD LEFT VENTRICULAR ASSIST DEVICE (LVAD) COMPLICATION, SUBSEQUENT ENCOUNTER: ICD-10-CM

## 2023-02-08 DIAGNOSIS — I49.01 VF (VENTRICULAR FIBRILLATION): ICD-10-CM

## 2023-02-08 DIAGNOSIS — R94.2 ABNORMAL PFT: ICD-10-CM

## 2023-02-08 DIAGNOSIS — Z95.811 LVAD (LEFT VENTRICULAR ASSIST DEVICE) PRESENT: ICD-10-CM

## 2023-02-08 PROBLEM — Z93.0 TRACHEOSTOMY PRESENT: Status: RESOLVED | Noted: 2022-10-03 | Resolved: 2023-02-08

## 2023-02-08 PROCEDURE — 3074F SYST BP LT 130 MM HG: CPT | Mod: CPTII,95,, | Performed by: INTERNAL MEDICINE

## 2023-02-08 PROCEDURE — 1111F PR DISCHARGE MEDS RECONCILED W/ CURRENT OUTPATIENT MED LIST: ICD-10-PCS | Mod: CPTII,95,, | Performed by: INTERNAL MEDICINE

## 2023-02-08 PROCEDURE — 1159F PR MEDICATION LIST DOCUMENTED IN MEDICAL RECORD: ICD-10-PCS | Mod: CPTII,95,, | Performed by: INTERNAL MEDICINE

## 2023-02-08 PROCEDURE — 3074F PR MOST RECENT SYSTOLIC BLOOD PRESSURE < 130 MM HG: ICD-10-PCS | Mod: CPTII,95,, | Performed by: INTERNAL MEDICINE

## 2023-02-08 PROCEDURE — 1160F PR REVIEW ALL MEDS BY PRESCRIBER/CLIN PHARMACIST DOCUMENTED: ICD-10-PCS | Mod: CPTII,95,, | Performed by: INTERNAL MEDICINE

## 2023-02-08 PROCEDURE — 3078F PR MOST RECENT DIASTOLIC BLOOD PRESSURE < 80 MM HG: ICD-10-PCS | Mod: CPTII,95,, | Performed by: INTERNAL MEDICINE

## 2023-02-08 PROCEDURE — 99214 OFFICE O/P EST MOD 30 MIN: CPT | Mod: 95,,, | Performed by: INTERNAL MEDICINE

## 2023-02-08 PROCEDURE — 1159F MED LIST DOCD IN RCRD: CPT | Mod: CPTII,95,, | Performed by: INTERNAL MEDICINE

## 2023-02-08 PROCEDURE — 1160F RVW MEDS BY RX/DR IN RCRD: CPT | Mod: CPTII,95,, | Performed by: INTERNAL MEDICINE

## 2023-02-08 PROCEDURE — 1111F DSCHRG MED/CURRENT MED MERGE: CPT | Mod: CPTII,95,, | Performed by: INTERNAL MEDICINE

## 2023-02-08 PROCEDURE — 3078F DIAST BP <80 MM HG: CPT | Mod: CPTII,95,, | Performed by: INTERNAL MEDICINE

## 2023-02-08 PROCEDURE — 99214 PR OFFICE/OUTPT VISIT, EST, LEVL IV, 30-39 MIN: ICD-10-PCS | Mod: 95,,, | Performed by: INTERNAL MEDICINE

## 2023-02-08 NOTE — PHYSICIAN QUERY
PT Name: Tim Richards  MR #: [id number]     DOCUMENTATION CLARIFICATION     CDS/: Danna Wong RN CDIS             Contact information: [email or web address]    This form is a permanent document in the medical record.     Query Date: February 8, 2023    By submitting this query, we are merely seeking further clarification of documentation.  Please utilize your independent clinical judgment when addressing the question(s) below.    The Medical Record contains the following   Indicators Supporting Clinical Findings Location in Medical Record   x Heart Failure documented stage D HFrEF HM3 7/13/22  Discharge summary    x  Labs 1/27    x EF/Echo There is an LVAD present. Base speed is 6400 RPMs. The pump type is a Heartmate III. The interventricular septum appears to bow into the right ventricle. The aortic valve does not open.  The left ventricle is severely enlarged with eccentric hypertrophy and severely decreased systolic function.  The estimated ejection fraction is 13%.  There is severe left ventricular global hypokinesis.  There is abnormal paradoxical septal wall motion.  Left ventricular diastolic dysfunction.  Moderate right ventricular enlargement with moderately reduced right ventricular systolic function.  Moderate left atrial enlargement.  Moderate right atrial enlargement.  Mild aortic regurgitation.  Normal central venous pressure (3 mmHg).  The estimated PA systolic pressure is 32 mmHg. TTE 11/23    Radiology findings      Subjective/Objective Respiratory Conditions      Recent/Current MI      Heart Transplant, LVAD      Edema, JVD      Ascites     x Diuretics/Meds furosemide injection 80 mg  Dose: 80 mg  Freq: Once Route: IV  Start: 01/28/23 0915 End: 01/28/23 0903    furosemide injection 80 mg  Dose: 80 mg  Freq: Once Route: IV  Start: 01/27/23 1630 End: 01/27/23 1705    furosemide tablet 40 mg  Dose: 40 mg  Freq: Daily Route: Oral  Start: 01/29/23 1115 End: 01/30/23 0817 MAR            MAR           MAR     Other Treatment     x Other Of note, while he was admitted he had high doppler pressures that were most likely related to elevated fluid status.  He was given two doses of lasix 80mg IV with good diuresis.  Plan was for initiation of lasix 40mg po daily for maintenance of euvolemia.   Discharge summary      Heart failure is a clinical diagnosis which includes symptomatic fluid retention, elevated intracardiac pressures, and/or the inability of the heart to deliver adequate blood flow.    Heart Failure with reduced Ejection Fraction (HFrEF) or Systolic Heart Failure (loses ability to contract normally, EF is <40%)    Heart Failure with preserved Ejection Fraction (HFpEF) or Diastolic Heart Failure (stiff ventricles, does not relax properly, EF is >50%)     Heart Failure with Combined Systolic and Diastolic Failure (stiff ventricles, does not relax properly and EF is <50%)    Mid-range or mildly reduced ejection fraction (HFmrEF) is classified as systolic heart failure.  Congestive heart failure with a recovered EF is classified as Diastolic Heart Failure.  Common clues to acute exacerbation:  Rapidly progressive symptoms (w/in 2 weeks of presentation), using IV diuretics, using supplemental O2, pulmonary edema on Xray, new or worsening pleural effusion, +JVD or other signs of volume overload, MI w/in 4 weeks, and/or BNP >500  The clinical guidelines noted are only system guidelines, and do not replace the providers clinical judgment.    Provider, please specify the ACUITY of the heart failure     [  x ]  Acute on Chronic Systolic Heart Failure (HFrEF or HFmrEF) - worsening of CHF signs/symptoms in preexisting CHF   [   ]  Chronic Systolic Heart Failure (HFrEF or HFmrEF) - preexisting and stable   [   ]  Other (please specify): ___________________________________           Please document in your progress notes daily for the duration of treatment until resolved and include in your  discharge summary.    References:  American Heart Association editorial staff. (2017, May). Ejection Fraction Heart Failure Measurement. American Heart Association. https://www.heart.org/en/health-topics/heart-failure/diagnosing-heart-failure/ejection-fraction-heart-failure-measurement#:~:text=Ejection%20fraction%20(EF)%20is%20a,pushed%20out%20with%20each%20heartbeat  TATIANNA Joseph (2020, December 15). Heart failure with preserved ejection fraction: Clinical manifestations and diagnosis. [employer]. https://www.[employer]/contents/heart-failure-with-preserved-ejection-fraction-clinical-manifestations-and-diagnosis.  ICD-10-CM/PCS Coding Clinic Third Quarter ICD-10, Effective with discharges: September 8, 2020 [facility] § Heart failure with mid-range or mildly reduced ejection fraction (2020).  ICD-10-CM/PCS Coding Clinic Third Quarter ICD-10, Effective with discharges: September 8, 2020 [facility] § Heart failure with recovered ejection fraction (2020).  Form No. 10131

## 2023-02-08 NOTE — PROGRESS NOTES
The patient location is: home  The chief complaint leading to consultation is: a car  Visit type: audiovisual  Total time spent with patient: 20 min  Each patient to whom he or she provides medical services by telemedicine is:  (1) informed of the relationship between the physician and patient and the respective role of any other health care provider with respect to management of the patient; and (2) notified that he or she may decline to receive medical services by telemedicine and may withdraw from such care at any time.      Reason for visit: Device management/ ICD reimplant     HPI:   Tim Richards is a 57 y.o. male with a significant cardiac history that includes non ischemic cardiomyopathy with severely reduced LVEF s/p ICD ( 2014)  and  s/p HM 2 implant (3/2018), history of VT and VF    He had a generator change and ICD lead revision on March 9, 2020. He was seen in clinic for follow up and was noted to have drainage from the ICD site. He was subsequently admitted in the inpatient setting for management of  ICD pocket infection. He underwent success device extraction on 7/10/2020 with residual left chest wall pocket hematoma. He was transfused with RBC but did well and was discharged home on IV antibiotics and a lifevest with plans for reimplant after course of antibiotics prescribed by ID.     SICD implanted 9/14/2020. Had shocks for VT 12/2021. We added (later increased) mexiletine.  He's been feeling great recently, but was hospitalized July - Sept 2022. Had VAD errors and needed it to be replaced.      Past Medical History:   Diagnosis Date    A-fib     Anticoagulant long-term use     Atrial flutter 7/30/2022    CHF (congestive heart failure)     Class 1 obesity due to excess calories with serious comorbidity and body mass index (BMI) of 31.0 to 31.9 in adult     Class 1 obesity due to excess calories with serious comorbidity and body mass index (BMI) of 32.0 to 32.9 in adult     Dilated  cardiomyopathy 1/10/2018    Disorder of kidney and ureter     CKD    Encounter for blood transfusion     Essential hypertension 2022    Gout     HTN (hypertension)     Hx of psychiatric care     ICD (implantable cardioverter-defibrillator) infection 2020    Psychiatric problem     Thyroid disease     Ventricular tachycardia (paroxysmal)         Social History     Socioeconomic History    Marital status:     Number of children: 3   Occupational History     Employer: [employer]    Tobacco Use    Smoking status: Former     Packs/day: 1.00     Years: 31.00     Pack years: 31.00     Types: Cigarettes     Quit date: 2018     Years since quittin.0    Smokeless tobacco: Former    Tobacco comments:     pt is quiting on his own - pt stated not qualified for program;  pt  quit on his own   Substance and Sexual Activity    Alcohol use: No     Alcohol/week: 0.0 standard drinks     Comment: quit    Drug use: No    Sexual activity: Yes     Partners: Female     Birth control/protection: None     Comment: 10/5/17  with same partner 7 years    Social History Narrative    Disabled     Social Determinants of Health     Financial Resource Strain: Unknown    Difficulty of Paying Living Expenses: Patient refused   Food Insecurity: Unknown    Worried About Running Out of Food in the Last Year: Patient refused    Ran Out of Food in the Last Year: Patient refused   Transportation Needs: Unknown    Lack of Transportation (Medical): Patient refused    Lack of Transportation (Non-Medical): Patient refused   Physical Activity: Inactive    Days of Exercise per Week: 0 days    Minutes of Exercise per Session: 0 min   Stress: Stress Concern Present    Feeling of Stress : Rather much   Social Connections: Unknown    Frequency of Communication with Friends and Family: Patient refused    Frequency of Social Gatherings with Friends and Family: Patient refused    Active Member of Clubs or Organizations: No     Attends Club or Organization Meetings: Patient refused    Marital Status: Patient refused   Housing Stability: Unknown    Unable to Pay for Housing in the Last Year: Patient refused    Unstable Housing in the Last Year: Patient refused        Family History   Problem Relation Age of Onset    Hypertension Father     Diabetes Father     Coronary artery disease Father     Heart disease Father         CHF    No Known Problems Mother     Cancer Sister 54        breast CA    No Known Problems Brother     Anxiety disorder Neg Hx     Depression Neg Hx     Dementia Neg Hx     Bipolar disorder Neg Hx     Suicide Neg Hx         Current Outpatient Medications   Medication Sig Dispense Refill    ALPRAZolam (XANAX) 1 MG tablet Take 0.5-1 tablets (0.5-1 mg total) by mouth 2 (two) times daily as needed for Anxiety. 30 tablet 5    amiodarone (PACERONE) 200 MG Tab Take 2 tablets (400 mg total) by mouth once daily. 180 tablet 3    ferrous gluconate (FERGON) 324 MG tablet Take 1 tablet (324 mg total) by mouth 2 (two) times daily with meals. 60 tablet 11    furosemide (LASIX) 40 MG tablet Take 1 tablet (40 mg total) by mouth once daily. 30 tablet 11    levothyroxine (SYNTHROID) 88 MCG tablet Take 1 tablet (88 mcg total) by mouth before breakfast. 30 tablet 11    magnesium oxide (MAG-OX) 400 mg (241.3 mg magnesium) tablet Take 1 tablet (400 mg total) by mouth 2 (two) times daily. 90 tablet 11    mexiletine (MEXITIL) 250 MG Cap Take 1 capsule (250 mg total) by mouth every 8 (eight) hours. 90 capsule 11    mirtazapine (REMERON) 30 MG tablet Take 1 tablet (30 mg total) by mouth every evening. 90 tablet 1    omega-3 acid ethyl esters (LOVAZA) 1 gram capsule Take 2 capsules (2 g total) by mouth 2 (two) times daily. 360 capsule 3    pantoprazole (PROTONIX) 40 MG tablet Take 1 tablet (40 mg total) by mouth daily with lunch. 90 tablet 3    sodium chloride 0.9% SolP 100 mL with ertapenem 1 gram SolR 1 g Inject 1 g into the vein once daily.       voriconazole (VFEND) 200 MG Tab Take 1 tablet (200 mg total) by mouth 2 (two) times daily. 60 tablet 2    warfarin (COUMADIN) 5 MG tablet Take a half tablet (2.5mg) by mouth on 10/5 only. Then take 1 tablet (5mg) daily 135 tablet 3     No current facility-administered medications for this visit.        Constitutional: (-)fevers, (-)chills, (-)night sweats  HEENT: (-) headaches (-) lightheadedness (-) blurry vision, (-) nose bleeds   Cardiovascular: (-)chest pain (-)paroxysmal nocturnal dyspnea (-)orthopnea, chest wall pain,   Respiratory: (-)shortness of breath, (+)dyspnea on exertion (-)hemoptysis  Gastrointestinal: (-)abdominal pain (-)nausea (-)vomiting (-)hematemesis    Musculoskeletal: (-)arthralgias (+)limited range of motion  Neurologic: (-)parasthesias (-)mood disorder (-)anxiety. Orthostatic LH sometimes.  Endo: (-)polyuria (-)polydipsia (-)heat/cold intolerance  Skin: (-)rash     Physical Exam:   Wt Readings from Last 3 Encounters:   02/01/23 95.7 kg (210 lb 15.7 oz)   01/29/23 90.9 kg (200 lb 6.4 oz)   01/12/23 98.4 kg (217 lb)     Temp Readings from Last 3 Encounters:   01/29/23 98.5 °F (36.9 °C) (Oral)   01/12/23 97.1 °F (36.2 °C) (Oral)   01/06/23 99.2 °F (37.3 °C) (Oral)     BP Readings from Last 3 Encounters:   02/08/23 90/65   01/29/23 (!) 87/61   01/12/23 (!) 90/0     Pulse Readings from Last 3 Encounters:   02/08/23 75   01/29/23 74   12/03/22 66       Well developed, well nourished.   No distress.  Speaks in full sentences.        Assessment/Plan:  Tim Richards is a 57 y.o. male with non ischemic cardiomyoapthy s/p HM II,  History of VT and VF.      #Hx of VT/VT - on amiodarone and mexiletine; has SICD  # NICM s/p LVAD HM II   # S/p BiV explant complicated by chest wall hematoma     Continue to follow SICD in device clinic. Monitoring lead given advisory from [facility].   Pulm c/s re: decreased DLCO on PFTs from January. Note pulm illness for much of 2022, which may have contributed. However,  not sure what we'd do if there were amio tox (due to recurrent VT).    Return in 1 year, or earlier prn. Follow TSH, LFTs, PFTs.

## 2023-02-10 ENCOUNTER — PATIENT MESSAGE (OUTPATIENT)
Dept: CARDIOLOGY | Facility: CLINIC | Age: 58
End: 2023-02-10
Payer: MEDICARE

## 2023-02-10 NOTE — PROGRESS NOTES
02/10/2023-Patient missed 2/9/23 INR lab and sent [employer] message asking to go for INR on 2/13/23 becasue he is not feeling well.

## 2023-02-13 ENCOUNTER — ANTI-COAG VISIT (OUTPATIENT)
Dept: CARDIOLOGY | Facility: CLINIC | Age: 58
End: 2023-02-13
Payer: MEDICARE

## 2023-02-13 ENCOUNTER — PATIENT MESSAGE (OUTPATIENT)
Dept: CARDIOTHORACIC SURGERY | Facility: CLINIC | Age: 58
End: 2023-02-13
Payer: MEDICARE

## 2023-02-13 ENCOUNTER — LAB VISIT (OUTPATIENT)
Dept: LAB | Facility: HOSPITAL | Age: 58
End: 2023-02-13
Attending: INTERNAL MEDICINE
Payer: MEDICARE

## 2023-02-13 DIAGNOSIS — Z95.811 LVAD (LEFT VENTRICULAR ASSIST DEVICE) PRESENT: Primary | ICD-10-CM

## 2023-02-13 DIAGNOSIS — Z79.01 LONG TERM (CURRENT) USE OF ANTICOAGULANTS: ICD-10-CM

## 2023-02-13 DIAGNOSIS — I72.4 PSEUDOANEURYSM OF RIGHT FEMORAL ARTERY: Primary | ICD-10-CM

## 2023-02-13 DIAGNOSIS — Z95.811 LVAD (LEFT VENTRICULAR ASSIST DEVICE) PRESENT: ICD-10-CM

## 2023-02-13 LAB
INR PPP: 2.2 (ref 0.8–1.2)
PROTHROMBIN TIME: 22.2 SEC (ref 9–12.5)

## 2023-02-13 PROCEDURE — 93793 ANTICOAG MGMT PT WARFARIN: CPT | Mod: S$GLB,,,

## 2023-02-13 PROCEDURE — 85610 PROTHROMBIN TIME: CPT | Performed by: INTERNAL MEDICINE

## 2023-02-13 PROCEDURE — 93793 PR ANTICOAGULANT MGMT FOR PT TAKING WARFARIN: ICD-10-PCS | Mod: S$GLB,,,

## 2023-02-13 PROCEDURE — 36415 COLL VENOUS BLD VENIPUNCTURE: CPT | Performed by: INTERNAL MEDICINE

## 2023-02-15 NOTE — PROGRESS NOTES
Per order of Dr Hadley, pt to have BMP every 1-2 weeks to monitor creat and adjust meds.    Bmp added to lab draw on 2/7   Detail Level: Detailed

## 2023-02-23 ENCOUNTER — CLINICAL SUPPORT (OUTPATIENT)
Dept: TRANSPLANT | Facility: CLINIC | Age: 58
End: 2023-02-23
Payer: MEDICARE

## 2023-02-23 ENCOUNTER — LAB VISIT (OUTPATIENT)
Dept: LAB | Facility: HOSPITAL | Age: 58
End: 2023-02-23
Attending: INTERNAL MEDICINE
Payer: MEDICARE

## 2023-02-23 ENCOUNTER — ANTI-COAG VISIT (OUTPATIENT)
Dept: CARDIOLOGY | Facility: CLINIC | Age: 58
End: 2023-02-23
Payer: MEDICARE

## 2023-02-23 ENCOUNTER — OFFICE VISIT (OUTPATIENT)
Dept: TRANSPLANT | Facility: CLINIC | Age: 58
End: 2023-02-23
Payer: MEDICARE

## 2023-02-23 VITALS — TEMPERATURE: 98 F | WEIGHT: 209.5 LBS | HEIGHT: 73 IN | SYSTOLIC BLOOD PRESSURE: 80 MMHG | BODY MASS INDEX: 27.77 KG/M2

## 2023-02-23 DIAGNOSIS — I42.0 DILATED CARDIOMYOPATHY: ICD-10-CM

## 2023-02-23 DIAGNOSIS — Z79.01 ANTICOAGULATION MONITORING, INR RANGE 2-3: ICD-10-CM

## 2023-02-23 DIAGNOSIS — I10 PRIMARY HYPERTENSION: Chronic | ICD-10-CM

## 2023-02-23 DIAGNOSIS — I48.3 TYPICAL ATRIAL FLUTTER: ICD-10-CM

## 2023-02-23 DIAGNOSIS — Z95.811 LVAD (LEFT VENTRICULAR ASSIST DEVICE) PRESENT: Primary | Chronic | ICD-10-CM

## 2023-02-23 DIAGNOSIS — Z95.811 HEART REPLACED BY HEART ASSIST DEVICE: ICD-10-CM

## 2023-02-23 DIAGNOSIS — T82.9XXD LEFT VENTRICULAR ASSIST DEVICE (LVAD) COMPLICATION, SUBSEQUENT ENCOUNTER: ICD-10-CM

## 2023-02-23 DIAGNOSIS — I50.42 CHRONIC COMBINED SYSTOLIC AND DIASTOLIC CONGESTIVE HEART FAILURE: ICD-10-CM

## 2023-02-23 DIAGNOSIS — G47.33 OSA (OBSTRUCTIVE SLEEP APNEA): ICD-10-CM

## 2023-02-23 DIAGNOSIS — Z95.811 LVAD (LEFT VENTRICULAR ASSIST DEVICE) PRESENT: Chronic | ICD-10-CM

## 2023-02-23 PROBLEM — E87.1 HYPONATREMIA: Status: RESOLVED | Noted: 2022-08-23 | Resolved: 2023-02-23

## 2023-02-23 PROBLEM — E16.2 HYPOGLYCEMIA: Status: RESOLVED | Noted: 2018-03-08 | Resolved: 2023-02-23

## 2023-02-23 PROBLEM — E43 SEVERE MALNUTRITION: Status: RESOLVED | Noted: 2022-07-18 | Resolved: 2023-02-23

## 2023-02-23 PROBLEM — Z01.818 PRE-TRANSPLANT EVALUATION FOR HEART TRANSPLANT: Status: RESOLVED | Noted: 2018-09-27 | Resolved: 2023-02-23

## 2023-02-23 PROBLEM — I47.20 VT (VENTRICULAR TACHYCARDIA): Status: RESOLVED | Noted: 2019-10-12 | Resolved: 2023-02-23

## 2023-02-23 PROBLEM — I50.43 ACUTE ON CHRONIC COMBINED SYSTOLIC AND DIASTOLIC HEART FAILURE: Status: RESOLVED | Noted: 2022-08-30 | Resolved: 2023-02-23

## 2023-02-23 PROBLEM — I49.01 VF (VENTRICULAR FIBRILLATION): Status: RESOLVED | Noted: 2020-03-09 | Resolved: 2023-02-23

## 2023-02-23 LAB
ALBUMIN SERPL BCP-MCNC: 3.4 G/DL (ref 3.5–5.2)
ALP SERPL-CCNC: 122 U/L (ref 55–135)
ALT SERPL W/O P-5'-P-CCNC: 56 U/L (ref 10–44)
ANION GAP SERPL CALC-SCNC: 10 MMOL/L (ref 8–16)
AST SERPL-CCNC: 85 U/L (ref 10–40)
BASOPHILS # BLD AUTO: 0.02 K/UL (ref 0–0.2)
BASOPHILS NFR BLD: 0.5 % (ref 0–1.9)
BILIRUB DIRECT SERPL-MCNC: 0.2 MG/DL (ref 0.1–0.3)
BILIRUB SERPL-MCNC: 0.3 MG/DL (ref 0.1–1)
BNP SERPL-MCNC: 697 PG/ML (ref 0–99)
BUN SERPL-MCNC: 17 MG/DL (ref 6–20)
CALCIUM SERPL-MCNC: 9.2 MG/DL (ref 8.7–10.5)
CHLORIDE SERPL-SCNC: 106 MMOL/L (ref 95–110)
CHOLEST SERPL-MCNC: 174 MG/DL (ref 120–199)
CHOLEST/HDLC SERPL: 2.3 {RATIO} (ref 2–5)
CO2 SERPL-SCNC: 22 MMOL/L (ref 23–29)
CREAT SERPL-MCNC: 1.6 MG/DL (ref 0.5–1.4)
CRP SERPL-MCNC: 22.1 MG/L (ref 0–8.2)
DIFFERENTIAL METHOD: ABNORMAL
EOSINOPHIL # BLD AUTO: 0.1 K/UL (ref 0–0.5)
EOSINOPHIL NFR BLD: 3.3 % (ref 0–8)
ERYTHROCYTE [DISTWIDTH] IN BLOOD BY AUTOMATED COUNT: 16.3 % (ref 11.5–14.5)
EST. GFR  (NO RACE VARIABLE): 50.3 ML/MIN/1.73 M^2
GLUCOSE SERPL-MCNC: 75 MG/DL (ref 70–110)
HCT VFR BLD AUTO: 35.7 % (ref 40–54)
HDLC SERPL-MCNC: 77 MG/DL (ref 40–75)
HDLC SERPL: 44.3 % (ref 20–50)
HGB BLD-MCNC: 10.4 G/DL (ref 14–18)
IMM GRANULOCYTES # BLD AUTO: 0 K/UL (ref 0–0.04)
IMM GRANULOCYTES NFR BLD AUTO: 0 % (ref 0–0.5)
INR PPP: 2.3 (ref 0.8–1.2)
LDH SERPL L TO P-CCNC: 294 U/L (ref 110–260)
LDLC SERPL CALC-MCNC: 81 MG/DL (ref 63–159)
LYMPHOCYTES # BLD AUTO: 1 K/UL (ref 1–4.8)
LYMPHOCYTES NFR BLD: 25.2 % (ref 18–48)
MAGNESIUM SERPL-MCNC: 2.1 MG/DL (ref 1.6–2.6)
MCH RBC QN AUTO: 25.9 PG (ref 27–31)
MCHC RBC AUTO-ENTMCNC: 29.1 G/DL (ref 32–36)
MCV RBC AUTO: 89 FL (ref 82–98)
MONOCYTES # BLD AUTO: 0.4 K/UL (ref 0.3–1)
MONOCYTES NFR BLD: 10.5 % (ref 4–15)
NEUTROPHILS # BLD AUTO: 2.4 K/UL (ref 1.8–7.7)
NEUTROPHILS NFR BLD: 60.5 % (ref 38–73)
NONHDLC SERPL-MCNC: 97 MG/DL
NRBC BLD-RTO: 0 /100 WBC
PHOSPHATE SERPL-MCNC: 2.6 MG/DL (ref 2.7–4.5)
PLATELET # BLD AUTO: 289 K/UL (ref 150–450)
PMV BLD AUTO: 11 FL (ref 9.2–12.9)
POTASSIUM SERPL-SCNC: 4.1 MMOL/L (ref 3.5–5.1)
PREALB SERPL-MCNC: 18 MG/DL (ref 20–43)
PROT SERPL-MCNC: 8.1 G/DL (ref 6–8.4)
PROTHROMBIN TIME: 22.9 SEC (ref 9–12.5)
RBC # BLD AUTO: 4.02 M/UL (ref 4.6–6.2)
SODIUM SERPL-SCNC: 138 MMOL/L (ref 136–145)
TRIGL SERPL-MCNC: 80 MG/DL (ref 30–150)
WBC # BLD AUTO: 3.89 K/UL (ref 3.9–12.7)

## 2023-02-23 PROCEDURE — 3008F PR BODY MASS INDEX (BMI) DOCUMENTED: ICD-10-PCS | Mod: CPTII,S$GLB,, | Performed by: INTERNAL MEDICINE

## 2023-02-23 PROCEDURE — 99999 PR PBB SHADOW E&M-EST. PATIENT-LVL III: ICD-10-PCS | Mod: PBBFAC,,, | Performed by: INTERNAL MEDICINE

## 2023-02-23 PROCEDURE — 83735 ASSAY OF MAGNESIUM: CPT | Performed by: INTERNAL MEDICINE

## 2023-02-23 PROCEDURE — 80053 COMPREHEN METABOLIC PANEL: CPT | Performed by: INTERNAL MEDICINE

## 2023-02-23 PROCEDURE — 84134 ASSAY OF PREALBUMIN: CPT | Performed by: INTERNAL MEDICINE

## 2023-02-23 PROCEDURE — 83615 LACTATE (LD) (LDH) ENZYME: CPT | Performed by: INTERNAL MEDICINE

## 2023-02-23 PROCEDURE — 1111F PR DISCHARGE MEDS RECONCILED W/ CURRENT OUTPATIENT MED LIST: ICD-10-PCS | Mod: CPTII,S$GLB,, | Performed by: INTERNAL MEDICINE

## 2023-02-23 PROCEDURE — 99214 OFFICE O/P EST MOD 30 MIN: CPT | Mod: S$GLB,,, | Performed by: INTERNAL MEDICINE

## 2023-02-23 PROCEDURE — 86140 C-REACTIVE PROTEIN: CPT | Performed by: INTERNAL MEDICINE

## 2023-02-23 PROCEDURE — 36415 COLL VENOUS BLD VENIPUNCTURE: CPT | Performed by: INTERNAL MEDICINE

## 2023-02-23 PROCEDURE — 99999 PR PBB SHADOW E&M-EST. PATIENT-LVL III: CPT | Mod: PBBFAC,,, | Performed by: INTERNAL MEDICINE

## 2023-02-23 PROCEDURE — 85610 PROTHROMBIN TIME: CPT | Performed by: INTERNAL MEDICINE

## 2023-02-23 PROCEDURE — 85025 COMPLETE CBC W/AUTO DIFF WBC: CPT | Performed by: INTERNAL MEDICINE

## 2023-02-23 PROCEDURE — 84100 ASSAY OF PHOSPHORUS: CPT | Performed by: INTERNAL MEDICINE

## 2023-02-23 PROCEDURE — 3008F BODY MASS INDEX DOCD: CPT | Mod: CPTII,S$GLB,, | Performed by: INTERNAL MEDICINE

## 2023-02-23 PROCEDURE — 82248 BILIRUBIN DIRECT: CPT | Performed by: INTERNAL MEDICINE

## 2023-02-23 PROCEDURE — 99214 PR OFFICE/OUTPT VISIT, EST, LEVL IV, 30-39 MIN: ICD-10-PCS | Mod: S$GLB,,, | Performed by: INTERNAL MEDICINE

## 2023-02-23 PROCEDURE — 80061 LIPID PANEL: CPT | Performed by: INTERNAL MEDICINE

## 2023-02-23 PROCEDURE — 83880 ASSAY OF NATRIURETIC PEPTIDE: CPT | Performed by: INTERNAL MEDICINE

## 2023-02-23 PROCEDURE — 1111F DSCHRG MED/CURRENT MED MERGE: CPT | Mod: CPTII,S$GLB,, | Performed by: INTERNAL MEDICINE

## 2023-02-23 RX ORDER — FUROSEMIDE 40 MG/1
40 TABLET ORAL DAILY PRN
Qty: 30 TABLET | Refills: 5 | Status: SHIPPED | OUTPATIENT
Start: 2023-02-23 | End: 2023-04-13 | Stop reason: SDUPTHER

## 2023-02-23 NOTE — PROGRESS NOTES
"Date of Implant with Heartmate 3 LVAD: 22    PATIENT ARRIVED IN CLINIC:  Ambulatory   Accompanied by: N/A  Complaints/reason for visit today: routine    Vitals  Temperature, oral:   Temp Readings from Last 1 Encounters:   23 98.3 °F (36.8 °C) (Oral)     Blood Pressure:   BP Readings from Last 3 Encounters:   23 (!) 80/0   23 90/65   23 (!) 87/61        VAD Interrogation:  TXP SACHI INTERROGATIONS 2023   Type HeartMate3 - -   Flow 5.6 - -   Speed 6400 - -   PI 4.1 - -   Power (Jackson) 5.7 - -   LSL 6000 - -   Pulsatility Intermittent pulse Pulse Pulse     HCT:   Lab Results   Component Value Date    HCT 34.8 (L) 2023    HCT 31 (L) 2022       History Log: RBB  Problems / Issues / Alarms with VAD if any: None noted  VAD Sounds: HM3 Smooth    VAD Binder With Patient: no   Reviewed VAD Numbers In Binder: no    Equipment:  Emergency Equipment With Patient: yes   Any Equipment Issues:  1 Battery  and Replaced    It is medically necessary to ensure patient has properly functioning equipment and wearables to prevent infection, injury or death to patient.     DLES Assessment:  Appearance Of Driveline: 1, still healing, permission and instructions on medihoney given  Antibiotics: NO  Velour: no  Manual & Visual Inspection Of Driveline: No kinks or tears noted  Stabilization Device In Use: yes, sun securement device    Heartmate 3 Module Cable:  No yellow exposed    Patient MyChart Questionnaire:   VAD QUESTIONNAIRE ANSWERS 2021   Have you had any LVAD related issues or alarms? No   If yes, please provide additional details: -   Have you had any LVAD Equipment issues? Yes   If yes, please provide additional details: Controller or batteries   How often do you do your LVAD dressing change? Every other day   What type of dressing change do you do?  Daily "scrubby" kits   Do you need dressing change supplies? Yes   If yes, what kind (i.e. boxes/kits)? Box "   Do you need any new LVAD Accessories? (i.e Battery Holster Vest, Holster Vest, RT/LT Consolidation Bag) No   If yes, what kind of accessories do you need? Na   Have you been using your Monthly Equipment Checklist? Yes   Description of Stools: Normal and formed without blood   How Often: Weekly   Color Of Urine: Clear/Yellow   Are you experiencing: Anxiety   Do you see a: Psychiatrist   How many hours per night are you sleeping? 7   Do you take any medications to help you fall asleep? Yes   If yes, what medications do you take to help you fall asleep?  Xanex   Are you Showering? Yes   Do you change your dressing immediately after you dry off?  Yes   Are you exercising? Yes   If so, what do you do and for how long per day? 1hr   How often are you exercising? Daily   Are you working or what do you do to stay active? N/a   Are you driving? Yes   Do you know that if you have an alarm while driving you need to pull over first before looking at your alarm to avoid an accident? Yes   Are you in pain? No   If so, please rate: -   What hurts? -   Describe pain: -   Do you take any medications for pain?  No   If yes, what kind? -   Does this relieve the pain? -   How often are you taking pain medicine? -   What can we do for you? Anything   Is your appointment today? Yes   Do you have your Emergency Bag with you? Yes   Do you have your VAD Binder with you in clinic today?  No        Assessment:   PAIN: NO  Complaints Of Nausea / Vomiting: None noted    Appearance and Frequency Of Stools: normal and formed without blood & daily  Color Of Urine: clear/yellow  Coping/Depression/Anxiety: coping okay  Sleep Habits: 12 hrs /night  Sleep Aids:  remeron  Showering: No  Activity/Exercise: as tolerated   Driving: Yes. Reminded to pull over should there be an alarm before looking down at controller.    DLES Dressing Care:   Frequency of Dressing Changes: daily & daily kit  Pt In Need Of Management Kits?:no   It is medically necessary  to have VAD management kits in order to prevent infection or to assist in the healing of an infected DLES.    Labs:    Chemistry        Component Value Date/Time     01/29/2023 0503    K 4.0 01/29/2023 0503    CL 99 01/29/2023 0503    CO2 24 01/29/2023 0503    BUN 12 01/29/2023 0503    CREATININE 1.5 (H) 01/29/2023 0503    GLU 94 01/29/2023 0503        Component Value Date/Time    CALCIUM 9.8 01/29/2023 0503    ALKPHOS 77 01/27/2023 1112    AST 30 01/27/2023 1112    ALT 21 01/27/2023 1112    BILITOT 0.3 01/27/2023 1112    ESTGFRAFRICA 37.6 (A) 07/31/2022 2027    EGFRNONAA 32.5 (A) 07/31/2022 2027            Magnesium   Date Value Ref Range Status   01/29/2023 2.0 1.6 - 2.6 mg/dL Final       Lab Results   Component Value Date    WBC 5.31 01/29/2023    HGB 10.2 (L) 01/29/2023    HCT 34.8 (L) 01/29/2023    MCV 89 01/29/2023     01/29/2023       Lab Results   Component Value Date    INR 2.2 (H) 02/13/2023    INR 3.0 (H) 02/06/2023    INR 1.9 (H) 02/01/2023       BNP   Date Value Ref Range Status   01/27/2023 521 (H) 0 - 99 pg/mL Final     Comment:     Values of less than 100 pg/ml are consistent with non-CHF populations.   01/12/2023 1,680 (H) 0 - 99 pg/mL Final     Comment:     Values of less than 100 pg/ml are consistent with non-CHF populations.   12/01/2022 309 (H) 0 - 99 pg/mL Final     Comment:     Values of less than 100 pg/ml are consistent with non-CHF populations.       LD   Date Value Ref Range Status   01/29/2023 203 110 - 260 U/L Final     Comment:     Results are increased in hemolyzed samples.   01/28/2023 181 110 - 260 U/L Final     Comment:     Results are increased in hemolyzed samples.   01/12/2023 229 110 - 260 U/L Final     Comment:     Results are increased in hemolyzed samples.       Labs reviewed with patient: YES- after clinic appointment    Medication Reconciliation: per MA.  New Medication Detail Provided: yes  Coumadin Managed by: [facility]    Education: Reviewed  driveline care, emergency procedures, how to change the controller, alarms with patient, as well as discussed how to page the VAD coordinator in case of an emergency.   Educated patient/family that chest compressions are allowed in the event they are needed.    Reminded patient/caregiver not to touch their face and to cover their mouth when they cough or sneeze.   Vaccines: Pt informed that we are encouraging all VAD patients to receive the Flu and COVID vaccines and boosters. Informed pt that they can take tylenol but should avoid other NSAIDs.       Plans/Needs: Patient seen in clinic for routine follow up, issued battery [id number] [facility] [place] to replace expiring battery. Patient given instructions on what batteries to bring with him to next appointment. Patient completed a lipid panel and will RTC in 1 month.      Hurricane Season: No

## 2023-02-23 NOTE — PROGRESS NOTES
Advanced [facility] Transplantation Clinic Follow up.        Attending Physician: Isaiah Santizo MD.  The patient's last visit with me was on 9/16/2021.         HPIOrestes Richards is a 57 year old AA male with stage D HFrEF HM3 7/13/22 (previously Heartmate II outflow graft changed on 3/8/2018) with complicated post operative course, see previous clinic note, also has VT with ICD discharges, A flutter with RVR, avascular necrosis of his femoral head, and amio induced hyperthyroidism requiring methimazole.  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  New growth of aspergillus flavus post discharge from his surgical site (10/31/22), was discharged with plan for ertapenem and voriconazole.  Ertapenem stopped on 1/6/23 and remained on the voriconazole.       He received a routine CT abdomen to evaluate a ventral hernia which incidentally showed a mixed collection in the R inguinal region (10 cm x 6.1 cm) concerning for hematoma, also another simple fluid collection in the inferolateral aspect measuring 3.3 x 3.8 cm favoring seroma vs lymphocele.      Vascular surgery was consulted for evaluation and after review of the imaging determined no need for urgent surgical intervention, with plan for close follow up in clinic in 1 week.  Ultrasound performed showed no abscess.  Of note, while he was admitted he had high doppler pressures that were most likely related to elevated fluid status.  He was given two doses of lasix 80mg IV with good diuresis.  Plan was for initiation of lasix 40mg po daily for maintenance of euvolemia.      Today he comes by himself. He denies CV symptoms.  He has gained weight since being discharged after his last LVAD implant. He currently only takes lasix as needed, maybe once  a week.    Review of Systems   Constitutional:  Positive for fatigue. Negative for activity change, chills, diaphoresis and fever.   HENT:  Negative for nasal congestion, rhinorrhea and sore throat.    Eyes:  Negative for visual disturbance.   Respiratory:  Positive for shortness of breath. Negative for cough, choking, chest tightness and stridor.    Cardiovascular:  Negative for chest pain, palpitations and leg swelling.   Gastrointestinal:  Negative for abdominal pain, diarrhea, nausea and vomiting.   Genitourinary:  Negative for difficulty urinating, dysuria and hematuria.   Integumentary:  Negative for rash.   Neurological:  Negative for seizures, syncope and light-headedness.   Psychiatric/Behavioral:  Negative for agitation and hallucinations.       Past Medical History:   Diagnosis Date    A-fib     Anticoagulant long-term use     Atrial flutter 7/30/2022    CHF (congestive heart failure)     Class 1 obesity due to excess calories with serious comorbidity and body mass index (BMI) of 31.0 to 31.9 in adult     Class 1 obesity due to excess calories with serious comorbidity and body mass index (BMI) of 32.0 to 32.9 in adult     Dilated cardiomyopathy 1/10/2018    Disorder of kidney and ureter     CKD    Encounter for blood transfusion     Essential hypertension 8/28/2022    Gout     HTN (hypertension)     Hx of psychiatric care     ICD (implantable cardioverter-defibrillator) infection 7/1/2020    Psychiatric problem     Thyroid disease     Ventricular tachycardia (paroxysmal)         Past Surgical History:   Procedure Laterality Date    AORTIC VALVULOPLASTY N/A 7/13/2022    Procedure: REPAIR, AORTIC VALVE;  Surgeon: Yg Kaufman MD;  Location: [facility] OR [address] [place] [address];  Service: Cardiovascular;  Laterality: N/A;    APPLICATION OF WOUND VACUUM-ASSISTED CLOSURE DEVICE N/A 7/15/2022    Procedure: APPLICATION, WOUND VAC;  Surgeon: Yg Kaufman MD;  Location: [facility] OR [address] [place] [address];  Service: Cardiovascular;  Laterality: N/A;   50 x 5 cm     APPLICATION OF WOUND VACUUM-ASSISTED CLOSURE DEVICE Right 9/27/2022    Procedure: APPLICATION, WOUND VAC;  Surgeon: Kole Tabares MD;  [facility];  Service: Plastics;  Laterality: Right;    CARDIAC CATHETERIZATION  Dec. 2012    CARDIAC DEFIBRILLATOR PLACEMENT Left     CRRT-D    COLONOSCOPY N/A 3/6/2018    Procedure: COLONOSCOPY;  Surgeon: Alonso Bone MD;  Location: [facility] ENDO (2ND FLR);  Service: Endoscopy;  Laterality: N/A;    COLONOSCOPY N/A 7/17/2019    Procedure: COLONOSCOPY;  Surgeon: Blane Valdez MD;  Location: [facility] ENDO (2ND FLR);  Service: Endoscopy;  Laterality: N/A;    COLONOSCOPY N/A 7/18/2019    Procedure: COLONOSCOPY;  Surgeon: Blane Valdez MD;  Location: [facility] ENDO (2ND FLR);  Service: Endoscopy;  Laterality: N/A;    CREATION OF ILIOFEMORAL ARTERY BYPASS Right 9/27/2022    Procedure: CREATION, BYPASS, ARTERIAL, ILIAC TO FEMORAL WITH GRAFT;  Surgeon: Zach Hernández MD;  [facility];  Service: Peripheral Vascular;  Laterality: Right;    CREATION OF MUSCLE ROTATIONAL FLAP Right 9/27/2022    Procedure: CREATION, FLAP, MUSCLE ROTATION, SARTORIUS AND RECTUS FEMORIS;  Surgeon: Kole Tabares MD;  [facility];  Service: Plastics;  Laterality: Right;    ESOPHAGOGASTRODUODENOSCOPY N/A 7/17/2019    Procedure: EGD (ESOPHAGOGASTRODUODENOSCOPY);  Surgeon: Blane Valdez MD;  [facility]  Service: Endoscopy;  Laterality: N/A;    ESOPHAGOGASTRODUODENOSCOPY N/A 7/18/2019    Procedure: EGD (ESOPHAGOGASTRODUODENOSCOPY);  Surgeon: Blane Valdez MD;  Location: [facility] ENDO (2ND FLR);  Service: Endoscopy;  Laterality: N/A;    FOREIGN BODY REMOVAL N/A 7/22/2022    Procedure: REMOVAL, FOREIGN BODY;  Surgeon: Yg Kaufman MD;  [facility];  Service: Cardiovascular;  Laterality: N/A;  LVAD Heartmate 2 drive line removal    INSERTION OF GRAFT TO PERICARDIUM N/A 7/15/2022    Procedure: INSERTION, GRAFT, PERICARDIUM;  Surgeon:  Yg Kaufman MD;  [address]  Service: Cardiovascular;  Laterality: N/A;    IRRIGATION OF MEDIASTINUM N/A 7/15/2022    Procedure: IRRIGATION, MEDIASTINUM;  Surgeon: Yg Kaufman MD;  [address]  Service: Cardiovascular;  Laterality: N/A;    LYSIS OF ADHESIONS  7/13/2022    Procedure: LYSIS, ADHESIONS;  Surgeon: Yg Kaufman MD;  Location: [facility] OR [address] [place] [address];  Service: Cardiovascular;;    NONINVASIVE CARDIAC ELECTROPHYSIOLOGY STUDY N/A 10/18/2019    Procedure: CARDIAC ELECTROPHYSIOLOGY STUDY, NONINVASIVE;  Surgeon: Rza Wagner MD;  Location: [facility] EP LAB;  Service: Cardiology;  Laterality: N/A;  VT, DFTs, MDT CRTD in situ, LVAD, juarez MB, 3098    REPLACEMENT OF IMPLANTABLE CARDIOVERTER-DEFIBRILLATOR (ICD) GENERATOR N/A 3/9/2020    Procedure: REPLACEMENT, ICD GENERATOR;  Surgeon: Harry Yun MD;  Location: [facility] EP LAB;  Service: Cardiology;  Laterality: N/A;  VT, ICD Gen Change and Lead Revision, MDT, MAC, DM,3 Prep    REPLACEMENT OF LEFT VENTRICULAR ASSIST DEVICE (LVAD)  7/13/2022    Procedure: REPLACEMENT, LVAD;  Surgeon: Yg Kaufman MD;  Location: [address];  Service: Cardiovascular;;    REPLACEMENT OF PUMP N/A 7/13/2022    Procedure: REPLACEMENT, PUMP;  Surgeon: Yg Kaufman MD;  Location: [address];  Service: Cardiovascular;  Laterality: N/A;  LVAD pump exchange  EXPLANATION OF HEATMATE 2  IMPLANTATION OF HEARTMATE 3  IMPLANTATION OF 8MM CHIMNEY GRAFT TO RFA  INITIATION OF ECMO  TEMPORARY CLOSURE OF CHEST    REVISION OF IMPLANTABLE CARDIOVERTER-DEFIBRILLATOR (ICD) ELECTRODE LEAD PLACEMENT N/A 3/9/2020    Procedure: REVISION, INSERTION, ELECTRODE LEAD, ICD;  Surgeon: Harry Yun MD;  Location: [facility] EP LAB;  Service: Cardiology;  Laterality: N/A;  VT, ICD Gen Change and Lead Revision, MDT, MAC, DM,3 Prep    STERNAL WOUND CLOSURE N/A 7/14/2022    Procedure: CLOSURE, WOUND, STERNUM;  Surgeon: Yg Kaufman MD;  Location: [facility] OR [address] [place] [address];  Service:  Cardiovascular;  Laterality: N/A;  temporary closure  evacuation of hematoma    STERNAL WOUND CLOSURE N/A 7/15/2022    Procedure: CLOSURE, WOUND, STERNUM;  Surgeon: Yg Kaufman MD;  [facility];  Service: Cardiovascular;  Laterality: N/A;    TRACHEOSTOMY N/A 8/4/2022    Procedure: CREATION, TRACHEOSTOMY;  Surgeon: Germain Holt MD;  [facility];  Service: General;  Laterality: N/A;    TREATMENT OF CARDIAC ARRHYTHMIA  10/18/2019    Procedure: Cardioversion or Defibrillation;  Surgeon: Raz Wagner MD;  Location: [facility] EP LAB;  Service: Cardiology;;        Family History   Problem Relation Age of Onset    Hypertension Father     Diabetes Father     Coronary artery disease Father     Heart disease Father         CHF    No Known Problems Mother     Cancer Sister 54        breast CA    No Known Problems Brother     Anxiety disorder Neg Hx     Depression Neg Hx     Dementia Neg Hx     Bipolar disorder Neg Hx     Suicide Neg Hx         Review of patient's allergies indicates:   Allergen Reactions    Lisinopril Anaphylaxis    Hydralazine     Hydralazine analogues      Chronic constipation, impotence, dizziness        Current Outpatient Medications   Medication Instructions    ALPRAZolam (XANAX) 0.5-1 mg, Oral, 2 times daily PRN    amiodarone (PACERONE) 400 mg, Oral, Daily    ferrous gluconate (FERGON) 324 mg, Oral, 2 times daily with meals    furosemide (LASIX) 40 mg, Oral, Daily    levothyroxine (SYNTHROID) 88 mcg, Oral, Before breakfast    magnesium oxide (MAG-OX) 400 mg, Oral, 2 times daily    mexiletine (MEXITIL) 250 mg, Oral, Every 8 hours    mirtazapine (REMERON) 30 mg, Oral, Nightly    omega-3 acid ethyl esters (LOVAZA) 2 g, Oral, 2 times daily    pantoprazole (PROTONIX) 40 mg, Oral, With lunch    sodium chloride 0.9% SolP 100 mL with ertapenem 1 gram SolR 1 g 1 g, Intravenous, Daily    voriconazole (VFEND) 200 mg, Oral, 2 times daily    warfarin (COUMADIN) 5 MG tablet Take a  "half tablet (2.5mg) by mouth on 10/5 only. Then take 1 tablet (5mg) daily        Vitals:    02/23/23 1351   BP: (!) 80/0   Temp:         Wt Readings from Last 3 Encounters:   02/23/23 95 kg (209 lb 7.7 oz)   02/01/23 95.7 kg (210 lb 15.7 oz)   01/29/23 90.9 kg (200 lb 6.4 oz)     Temp Readings from Last 3 Encounters:   02/23/23 98.3 °F (36.8 °C) (Oral)   01/29/23 98.5 °F (36.9 °C) (Oral)   01/12/23 97.1 °F (36.2 °C) (Oral)     BP Readings from Last 3 Encounters:   02/23/23 (!) 80/0   02/08/23 90/65   01/29/23 (!) 87/61     Pulse Readings from Last 3 Encounters:   02/08/23 75   01/29/23 74   12/03/22 66        Body mass index is 27.64 kg/m². Estimated body surface area is 2.21 meters squared as calculated from the following:    Height as of this encounter: 6' 1" (1.854 m).    Weight as of this encounter: 95 kg (209 lb 7.7 oz).     Physical Exam  Constitutional:       Appearance: He is well-developed.   HENT:      Head: Normocephalic and atraumatic.      Right Ear: External ear normal.      Left Ear: External ear normal.   Eyes:      Conjunctiva/sclera: Conjunctivae normal.      Pupils: Pupils are equal, round, and reactive to light.   Neck:      Vascular: No hepatojugular reflux or JVD.   Cardiovascular:      Rate and Rhythm: Normal rate and regular rhythm.      Pulses: Intact distal pulses.      Heart sounds: No murmur heard.    No friction rub. No gallop.      Comments: Normal LVAD hum  Pulmonary:      Effort: Pulmonary effort is normal.      Breath sounds: Normal breath sounds.   Abdominal:      General: Bowel sounds are normal. There is no distension.      Palpations: Abdomen is soft.      Tenderness: There is no abdominal tenderness. There is no guarding or rebound.   Musculoskeletal:      Cervical back: Normal range of motion and neck supple.      Right lower leg: No edema.      Left lower leg: No edema.   Neurological:      Mental Status: He is alert and oriented to person, place, and time.        Lab Results "   Component Value Date     (H) 01/27/2023     01/29/2023    K 4.0 01/29/2023    MG 2.0 01/29/2023    CL 99 01/29/2023    CO2 24 01/29/2023    BUN 12 01/29/2023    CREATININE 1.5 (H) 01/29/2023    GLU 94 01/29/2023    HGBA1C 5.4 04/26/2021    AST 30 01/27/2023    ALT 21 01/27/2023    ALBUMIN 2.7 (L) 01/27/2023    PROT 7.0 01/27/2023    BILITOT 0.3 01/27/2023    WBC 5.31 01/29/2023    HGB 10.2 (L) 01/29/2023    HCT 34.8 (L) 01/29/2023    HCT 31 (L) 09/27/2022     01/29/2023    INR 2.2 (H) 02/13/2023    INR 2.5 12/08/2022    INR 3.1 (H) 09/24/2022     01/29/2023    TSH 11.914 (H) 02/01/2023    CHOL 130 05/19/2022    HDL 46 05/19/2022    LDLCALC 54.8 (L) 05/19/2022    TRIG 68 08/31/2022    L7OTRBU 8.4 08/14/2022    FREET4 1.42 02/01/2023       @LABRCNTIP(cpk,cpkmb,troponini,mb)@     No results found for this visit on 02/23/23.       Results for orders placed during the hospital encounter of 11/23/22    Echo Color Flow Doppler? Yes    Interpretation Summary  · There is an LVAD present. Base speed is 6400 RPMs. The pump type is a Heartmate III. The interventricular septum appears to bow into the right ventricle. The aortic valve does not open.  · The left ventricle is severely enlarged with eccentric hypertrophy and severely decreased systolic function.  · The estimated ejection fraction is 13%.  · There is severe left ventricular global hypokinesis.  · There is abnormal paradoxical septal wall motion.  · Left ventricular diastolic dysfunction.  · Moderate right ventricular enlargement with moderately reduced right ventricular systolic function.  · Moderate left atrial enlargement.  · Moderate right atrial enlargement.  · Mild aortic regurgitation.  · Normal central venous pressure (3 mmHg).  · The estimated PA systolic pressure is 32 mmHg.        No results found for this or any previous visit.         Assessment and Plan:  LVAD (left ventricular assist device) present  -     LVAD  Interrogations Out Patient Only  -     LVAD Maintenance    Chronic combined systolic and diastolic congestive heart failure    Dilated cardiomyopathy    Typical atrial flutter    Primary hypertension    Anticoagulation monitoring, INR range 2-3    Left ventricular assist device (LVAD) complication, subsequent encounter    CHICHI (obstructive sleep apnea)    Other orders  -     furosemide (LASIX) 40 MG tablet; Take 1 tablet (40 mg total) by mouth daily as needed.  Dispense: 30 tablet; Refill: 5          Chronic cardiomyopathy and systolic HF, NYHA class 2, stage D, s/p LVAD.  Hemodynamic status: warm, euvolemic, normotensive.    Antithrombotic therapy and target anticoagulation: INR/Prothrombin Time 2.3. Adjustment to warfarin dose per anticoagulation clinic.      LVAD related complications:   -Bleeding (gastrointestinal, epistaxis, etc): none.   -RV failure: none, normal JVP today.  -Thrombotic events and LDH:  none, ld 294.  -DLES and Infection:1, on voriconazole for groin/hernia wound infection.    No changes on medications.    Interrogation of Ventricular assist device was performed with physician analysis of device parameters and review of device function. I have personally reviewed the interrogation findings and agree with findings as stated.     Additionally, the patient has a patient centered goal of being able to improve their quality of life .

## 2023-02-23 NOTE — PROGRESS NOTES
TXP SACHI INTERROGATIONS 2/23/2023 1/29/2023 1/29/2023 1/29/2023 1/29/2023 1/28/2023 1/28/2023   Type HeartMate3 - - - - - -   Flow 5.6 - - - - - -   Speed 6400 - - - - - -   PI 4.1 - - - - - -   Power (Jackson) 5.7 - - - - - -   LSL 6000 - - - - - -   Pulsatility Intermittent pulse Pulse Pulse Pulse Intermittent pulse Intermittent pulse Pulse   }

## 2023-02-23 NOTE — LETTER
February 23, 2023        Nader Chiu  [address]  [address]  [place] [address]  Phone: [phone]  Fax: [phone]     Nader Chiu  [address]  [address]  [place] [address]  Phone: [phone]  Fax: [phone]             [id number]  [address]  [place] [address]  Phone: [phone]   Patient: Tim Richards   MR Number: [id number]   YOB: 1965   Date of Visit: 2/23/2023       Dear Dr. Nader Chiu, Nader Chiu    Thank you for referring Tim Richards to me for evaluation. Attached you will find relevant portions of my assessment and plan of care.    If you have questions, please do not hesitate to call me. I look forward to following Tim Richards along with you.    Sincerely,    Isaiah Santizo MD    Enclosure    If you would like to receive this communication electronically, please contact [email or web address] or [phone] to request [employer] access.    [employer] is a tool which provides read-only access to select patient information with whom you have a relationship. Its easy to use and provides real time access to review your patients record including encounter summaries, notes, results, and demographic information.    If you feel you have received this communication in error or would no longer like to receive these types of communications, please e-mail [email or web address]

## 2023-02-28 ENCOUNTER — PATIENT MESSAGE (OUTPATIENT)
Dept: PSYCHIATRY | Facility: CLINIC | Age: 58
End: 2023-02-28
Payer: MEDICARE

## 2023-03-02 ENCOUNTER — ANTI-COAG VISIT (OUTPATIENT)
Dept: CARDIOLOGY | Facility: CLINIC | Age: 58
End: 2023-03-02
Payer: MEDICARE

## 2023-03-02 ENCOUNTER — HOSPITAL ENCOUNTER (OUTPATIENT)
Dept: VASCULAR SURGERY | Facility: CLINIC | Age: 58
Discharge: HOME | End: 2023-03-02
Attending: SURGERY
Payer: MEDICARE

## 2023-03-02 ENCOUNTER — LAB VISIT (OUTPATIENT)
Dept: LAB | Facility: HOSPITAL | Age: 58
End: 2023-03-02
Attending: INTERNAL MEDICINE
Payer: MEDICARE

## 2023-03-02 ENCOUNTER — OFFICE VISIT (OUTPATIENT)
Dept: VASCULAR SURGERY | Facility: CLINIC | Age: 58
End: 2023-03-02
Attending: SURGERY
Payer: MEDICARE

## 2023-03-02 VITALS — TEMPERATURE: 98 F | WEIGHT: 213.88 LBS | HEIGHT: 73 IN | BODY MASS INDEX: 28.34 KG/M2

## 2023-03-02 DIAGNOSIS — I72.4 PSEUDOANEURYSM OF RIGHT FEMORAL ARTERY: Primary | ICD-10-CM

## 2023-03-02 DIAGNOSIS — I72.4 PSEUDOANEURYSM OF RIGHT FEMORAL ARTERY: ICD-10-CM

## 2023-03-02 DIAGNOSIS — Z79.01 LONG TERM (CURRENT) USE OF ANTICOAGULANTS: ICD-10-CM

## 2023-03-02 DIAGNOSIS — Z95.811 LVAD (LEFT VENTRICULAR ASSIST DEVICE) PRESENT: ICD-10-CM

## 2023-03-02 DIAGNOSIS — I73.9 PVD (PERIPHERAL VASCULAR DISEASE): Primary | ICD-10-CM

## 2023-03-02 LAB
INR PPP: 2.1 (ref 0.8–1.2)
PROTHROMBIN TIME: 20.9 SEC (ref 9–12.5)

## 2023-03-02 PROCEDURE — 85610 PROTHROMBIN TIME: CPT | Performed by: INTERNAL MEDICINE

## 2023-03-02 PROCEDURE — 93926 LOWER EXTREMITY STUDY: CPT | Mod: S$GLB,,, | Performed by: SURGERY

## 2023-03-02 PROCEDURE — 1159F PR MEDICATION LIST DOCUMENTED IN MEDICAL RECORD: ICD-10-PCS | Mod: CPTII,S$GLB,, | Performed by: SURGERY

## 2023-03-02 PROCEDURE — 99214 PR OFFICE/OUTPT VISIT, EST, LEVL IV, 30-39 MIN: ICD-10-PCS | Mod: S$GLB,,, | Performed by: SURGERY

## 2023-03-02 PROCEDURE — 93793 PR ANTICOAGULANT MGMT FOR PT TAKING WARFARIN: ICD-10-PCS | Mod: S$GLB,,,

## 2023-03-02 PROCEDURE — 93793 ANTICOAG MGMT PT WARFARIN: CPT | Mod: S$GLB,,,

## 2023-03-02 PROCEDURE — 93926 PR DUPLEX LO EXTREM ART UNILAT/LTD: ICD-10-PCS | Mod: S$GLB,,, | Performed by: SURGERY

## 2023-03-02 PROCEDURE — 99999 PR PBB SHADOW E&M-EST. PATIENT-LVL III: CPT | Mod: PBBFAC,,, | Performed by: SURGERY

## 2023-03-02 PROCEDURE — 99214 OFFICE O/P EST MOD 30 MIN: CPT | Mod: S$GLB,,, | Performed by: SURGERY

## 2023-03-02 PROCEDURE — 36415 COLL VENOUS BLD VENIPUNCTURE: CPT | Performed by: INTERNAL MEDICINE

## 2023-03-02 PROCEDURE — 3008F BODY MASS INDEX DOCD: CPT | Mod: CPTII,S$GLB,, | Performed by: SURGERY

## 2023-03-02 PROCEDURE — 3008F PR BODY MASS INDEX (BMI) DOCUMENTED: ICD-10-PCS | Mod: CPTII,S$GLB,, | Performed by: SURGERY

## 2023-03-02 PROCEDURE — 1159F MED LIST DOCD IN RCRD: CPT | Mod: CPTII,S$GLB,, | Performed by: SURGERY

## 2023-03-02 PROCEDURE — 99999 PR PBB SHADOW E&M-EST. PATIENT-LVL III: ICD-10-PCS | Mod: PBBFAC,,, | Performed by: SURGERY

## 2023-03-02 NOTE — PROGRESS NOTES
Tim Somers Maurice  03/02/2023    HPI:  Patient is a 57 y.o. male with a h/o alcohol and tobacco abuse and CHF with a LVAD.  He was recently hospitalized 06/24/22-09/04/22 for cardiogenic shock requiring [facility]-ECMO.  On 09/22/22 he was found to have purulence of the groin cannulation site and was placed on antibiotics. On 09/24/22 he sat with his legs cross for a prolonged period and when he uncrossed his legs he noticed he was in a pool of blood originating from his ECMO cannulation site.  He returned to the [facility] ED that day and on 09/27 underwent Resection of R common femoral artery, Creation of Right Iliofemoral bypass (end to end)  istal External Iliac to distal femoral artery, Explant of infected dacron femoral conduit with sartorius and rectus muscle flap creation.  He presents today for post-operative evaluation. He primarily complains of right leg weakness and fatigue, however he denies symptoms of claudication or rest pain.   No MI/stroke  Tobacco use: 1pack/wk for about 35 years    Interval History 03/02/2023   Patient presents today for follow-up evaluation of right iliofemoral bypass and rectus/sartorius flap.  Patient reports wound has completely healed with residual numbness and minimal pain.  Denies any fevers, chills, and reports his right leg weakness has improved.  He is scheduled for 1 final follow-up appointmen with infectious disease in 6 months.    Past Medical History:   Diagnosis Date    A-fib     Anticoagulant long-term use     Atrial flutter 7/30/2022    CHF (congestive heart failure)     Class 1 obesity due to excess calories with serious comorbidity and body mass index (BMI) of 31.0 to 31.9 in adult     Class 1 obesity due to excess calories with serious comorbidity and body mass index (BMI) of 32.0 to 32.9 in adult     Dilated cardiomyopathy 1/10/2018    Disorder of kidney and ureter     CKD    Encounter for blood transfusion     Essential hypertension 8/28/2022    Gout     HTN  (hypertension)     Hx of psychiatric care     ICD (implantable cardioverter-defibrillator) infection 7/1/2020    Psychiatric problem     Thyroid disease     Ventricular tachycardia (paroxysmal)      Past Surgical History:   Procedure Laterality Date    AORTIC VALVULOPLASTY N/A 7/13/2022    Procedure: REPAIR, AORTIC VALVE;  Surgeon: Yg Kaufman MD;  [facility];  Service: Cardiovascular;  Laterality: N/A;    APPLICATION OF WOUND VACUUM-ASSISTED CLOSURE DEVICE N/A 7/15/2022    Procedure: APPLICATION, WOUND VAC;  Surgeon: Yg Kaufman MD;  [facility];  Service: Cardiovascular;  Laterality: N/A;  50 x 5 cm     APPLICATION OF WOUND VACUUM-ASSISTED CLOSURE DEVICE Right 9/27/2022    Procedure: APPLICATION, WOUND VAC;  Surgeon: Kole Tabares MD;  [facility];  Service: Plastics;  Laterality: Right;    CARDIAC CATHETERIZATION  Dec. 2012    CARDIAC DEFIBRILLATOR PLACEMENT Left     CRRT-D    COLONOSCOPY N/A 3/6/2018    Procedure: COLONOSCOPY;  Surgeon: Alonso Bone MD;  Location: [facility] ENDO (2ND FLR);  Service: Endoscopy;  Laterality: N/A;    COLONOSCOPY N/A 7/17/2019    Procedure: COLONOSCOPY;  Surgeon: Blane Valdez MD;  Location: [facility] ([facility]  Service: Endoscopy;  Laterality: N/A;    COLONOSCOPY N/A 7/18/2019    Procedure: COLONOSCOPY;  Surgeon: Blane Valdez MD;  Location: [facility] ENDO ([facility]);  Service: Endoscopy;  Laterality: N/A;    CREATION OF ILIOFEMORAL ARTERY BYPASS Right 9/27/2022    Procedure: CREATION, BYPASS, ARTERIAL, ILIAC TO FEMORAL WITH GRAFT;  Surgeon: Zach Hernández MD;  [facility];  Service: Peripheral Vascular;  Laterality: Right;    CREATION OF MUSCLE ROTATIONAL FLAP Right 9/27/2022    Procedure: CREATION, FLAP, MUSCLE ROTATION, SARTORIUS AND RECTUS FEMORIS;  Surgeon: Kole Tabares MD;  [facility];  Service: Plastics;  Laterality: Right;    ESOPHAGOGASTRODUODENOSCOPY N/A 7/17/2019    Procedure: EGD  (ESOPHAGOGASTRODUODENOSCOPY);  Surgeon: Blane Valdez MD;  Location: [facility] ENDO (2ND FLR);  Service: Endoscopy;  Laterality: N/A;    ESOPHAGOGASTRODUODENOSCOPY N/A 7/18/2019    Procedure: EGD (ESOPHAGOGASTRODUODENOSCOPY);  Surgeon: Blane Valdez MD;  Location: [facility] ENDO (2ND FLR);  Service: Endoscopy;  Laterality: N/A;    FOREIGN BODY REMOVAL N/A 7/22/2022    Procedure: REMOVAL, FOREIGN BODY;  Surgeon: Yg Kaufman MD;  Location: [facility] OR 2ND FLR;  Service: Cardiovascular;  Laterality: N/A;  LVAD Heartmate 2 drive line removal    INSERTION OF GRAFT TO PERICARDIUM N/A 7/15/2022    Procedure: INSERTION, GRAFT, PERICARDIUM;  Surgeon: Yg Kaufman MD;  Location: [facility] OR 2ND FLR;  Service: Cardiovascular;  Laterality: N/A;    IRRIGATION OF MEDIASTINUM N/A 7/15/2022    Procedure: IRRIGATION, MEDIASTINUM;  Surgeon: Yg Kaufman MD;  Location: [facility] OR [facility] FLR;  Service: Cardiovascular;  Laterality: N/A;    LYSIS OF ADHESIONS  7/13/2022    Procedure: LYSIS, ADHESIONS;  Surgeon: Yg Kaufman MD;  [facility];  Service: Cardiovascular;;    NONINVASIVE CARDIAC ELECTROPHYSIOLOGY STUDY N/A 10/18/2019    Procedure: CARDIAC ELECTROPHYSIOLOGY STUDY, NONINVASIVE;  Surgeon: Raz Wagner MD;  Location: [facility] EP LAB;  Service: Cardiology;  Laterality: N/A;  VT, DFTs, MDT CRTD in situ, LVAD, LASHELL pizarro, 3098    REPLACEMENT OF IMPLANTABLE CARDIOVERTER-DEFIBRILLATOR (ICD) GENERATOR N/A 3/9/2020    Procedure: REPLACEMENT, ICD GENERATOR;  Surgeon: Harry Yun MD;  Location: [facility] EP LAB;  Service: Cardiology;  Laterality: N/A;  VT, ICD Gen Change and Lead Revision, MDT, MAC, DM,3 Prep    REPLACEMENT OF LEFT VENTRICULAR ASSIST DEVICE (LVAD)  7/13/2022    Procedure: REPLACEMENT, LVAD;  Surgeon: Yg Kaufman MD;  Location: [facility] OR [facility] FLR;  Service: Cardiovascular;;    REPLACEMENT OF PUMP N/A 7/13/2022    Procedure: REPLACEMENT, PUMP;  Surgeon: Yg Kaufman MD;  Location: [facility] OR [address] [place] [address];  Service: Cardiovascular;   Laterality: N/A;  LVAD pump exchange  EXPLANATION OF HEATMATE 2  IMPLANTATION OF HEARTMATE 3  IMPLANTATION OF 8MM CHIMNEY GRAFT TO RFA  INITIATION OF ECMO  TEMPORARY CLOSURE OF CHEST    REVISION OF IMPLANTABLE CARDIOVERTER-DEFIBRILLATOR (ICD) ELECTRODE LEAD PLACEMENT N/A 3/9/2020    Procedure: REVISION, INSERTION, ELECTRODE LEAD, ICD;  Surgeon: Harry Yun MD;  Location: [facility] EP LAB;  Service: Cardiology;  Laterality: N/A;  VT, ICD Gen Change and Lead Revision, MDT, MAC, DM,3 Prep    STERNAL WOUND CLOSURE N/A 2022    Procedure: CLOSURE, WOUND, STERNUM;  Surgeon: Yg Kaufman MD;  Location: [facility] OR [facility] FLR;  Service: Cardiovascular;  Laterality: N/A;  temporary closure  evacuation of hematoma    STERNAL WOUND CLOSURE N/A 7/15/2022    Procedure: CLOSURE, WOUND, STERNUM;  Surgeon: Yg Kaufman MD;  [facility];  Service: Cardiovascular;  Laterality: N/A;    TRACHEOSTOMY N/A 2022    Procedure: CREATION, TRACHEOSTOMY;  Surgeon: Germain Holt MD;  [facility];  Service: General;  Laterality: N/A;    TREATMENT OF CARDIAC ARRHYTHMIA  10/18/2019    Procedure: Cardioversion or Defibrillation;  Surgeon: Raz Wagner MD;  Location: [facility] EP LAB;  Service: Cardiology;;     Family History   Problem Relation Age of Onset    Hypertension Father     Diabetes Father     Coronary artery disease Father     Heart disease Father         CHF    No Known Problems Mother     Cancer Sister 54        breast CA    No Known Problems Brother     Anxiety disorder Neg Hx     Depression Neg Hx     Dementia Neg Hx     Bipolar disorder Neg Hx     Suicide Neg Hx      Social History     Socioeconomic History    Marital status:     Number of children: 3   Occupational History     Employer: [employer]    Tobacco Use    Smoking status: Former     Packs/day: 1.00     Years: 31.00     Pack years: 31.00     Types: Cigarettes     Quit date: 2018     Years since quittin.1     Smokeless tobacco: Former    Tobacco comments:     pt is quiting on his own - pt stated not qualified for program; 2/16 pt  quit on his own   Substance and Sexual Activity    Alcohol use: No     Alcohol/week: 0.0 standard drinks     Comment: quit    Drug use: No    Sexual activity: Yes     Partners: Female     Birth control/protection: None     Comment: 10/5/17  with same partner 7 years    Social History Narrative    Disabled     Social Determinants of Health     Financial Resource Strain: Unknown    Difficulty of Paying Living Expenses: Patient refused   Food Insecurity: Unknown    Worried About Running Out of Food in the Last Year: Patient refused    Ran Out of Food in the Last Year: Patient refused   Transportation Needs: Unknown    Lack of Transportation (Medical): Patient refused    Lack of Transportation (Non-Medical): Patient refused   Physical Activity: Inactive    Days of Exercise per Week: 0 days    Minutes of Exercise per Session: 0 min   Stress: Stress Concern Present    Feeling of Stress : Rather much   Social Connections: Unknown    Frequency of Communication with Friends and Family: Patient refused    Frequency of Social Gatherings with Friends and Family: Patient refused    Active Member of Clubs or Organizations: No    Attends Club or Organization Meetings: Patient refused    Marital Status: Patient refused   Housing Stability: Unknown    Unable to Pay for Housing in the Last Year: Patient refused    Unstable Housing in the Last Year: Patient refused       Current Outpatient Medications:     ALPRAZolam (XANAX) 1 MG tablet, Take 0.5-1 tablets (0.5-1 mg total) by mouth 2 (two) times daily as needed for Anxiety., Disp: 30 tablet, Rfl: 5    amiodarone (PACERONE) 200 MG Tab, Take 2 tablets (400 mg total) by mouth once daily., Disp: 180 tablet, Rfl: 3    ferrous gluconate (FERGON) 324 MG tablet, Take 1 tablet (324 mg total) by mouth 2 (two) times daily with meals., Disp: 60 tablet, Rfl: 11     furosemide (LASIX) 40 MG tablet, Take 1 tablet (40 mg total) by mouth daily as needed., Disp: 30 tablet, Rfl: 5    levothyroxine (SYNTHROID) 88 MCG tablet, Take 1 tablet (88 mcg total) by mouth before breakfast., Disp: 30 tablet, Rfl: 11    magnesium oxide (MAG-OX) 400 mg (241.3 mg magnesium) tablet, Take 1 tablet (400 mg total) by mouth 2 (two) times daily., Disp: 90 tablet, Rfl: 11    mexiletine (MEXITIL) 250 MG Cap, Take 1 capsule (250 mg total) by mouth every 8 (eight) hours., Disp: 90 capsule, Rfl: 11    mirtazapine (REMERON) 30 MG tablet, Take 1 tablet (30 mg total) by mouth every evening., Disp: 90 tablet, Rfl: 1    omega-3 acid ethyl esters (LOVAZA) 1 gram capsule, Take 2 capsules (2 g total) by mouth 2 (two) times daily., Disp: 360 capsule, Rfl: 3    pantoprazole (PROTONIX) 40 MG tablet, Take 1 tablet (40 mg total) by mouth daily with lunch., Disp: 90 tablet, Rfl: 3    sodium chloride 0.9% SolP 100 mL with ertapenem 1 gram SolR 1 g, Inject 1 g into the vein once daily., Disp: , Rfl:     voriconazole (VFEND) 200 MG Tab, Take 1 tablet (200 mg total) by mouth 2 (two) times daily., Disp: 60 tablet, Rfl: 2    warfarin (COUMADIN) 5 MG tablet, Take a half tablet (2.5mg) by mouth on 10/5 only. Then take 1 tablet (5mg) daily, Disp: 135 tablet, Rfl: 3    REVIEW OF SYSTEMS:  General: negative; ENT: negative; Allergy and Immunology: negative; Hematological and Lymphatic: Negative; Endocrine: negative; Respiratory: no cough, shortness of breath, or wheezing; Cardiovascular: no chest pain or dyspnea on exertion; Gastrointestinal: no abdominal pain/back, change in bowel habits, or bloody stools; Genito-Urinary: no dysuria, trouble voiding, or hematuria; Musculoskeletal: negative  Neurological: no TIA or stroke symptoms; Psychiatric: no nervousness, anxiety or depression.    PHYSICAL EXAM:         Temp: 98 °F (36.7 °C)      General appearance:  Alert, well-appearing, and in no distress.  Oriented to person, place, and  time   Neurological: Normal speech, no focal findings noted; CN II - XII grossly intact           Musculoskeletal: Digits/nail without cyanosis/clubbing.  Normal muscle strength/tone.                 Neck: Supple, no significant adenopathy; thyroid is not enlarged                Chest:  Clear to auscultation, no wheezes, rales or rhonchi, symmetric air entry     No use of accessory muscles             Cardiac: Normal rate and regular rhythm, S1 and S2 normal; PMI non-displaced          Abdomen: Soft, nontender, nondistended, no masses or organomegaly     No rebound tenderness noted; bowel sounds normal     No groin adenopathy      Extremities:   2+ femoral pulses bilaterally     pedal pulses not palpable.     Mild pre-tibial edema     No ulcerations     R groin incision with defect near the middle of incision dressed with foam and tegaderm, healing well. Nylon sutures in place along the rest of the incision    LAB RESULTS:  Lab Results   Component Value Date    K 4.1 02/23/2023    K 4.0 01/29/2023    K 3.7 01/28/2023    CREATININE 1.6 (H) 02/23/2023    CREATININE 1.5 (H) 01/29/2023    CREATININE 1.5 (H) 01/28/2023     Lab Results   Component Value Date    WBC 3.89 (L) 02/23/2023    WBC 5.31 01/29/2023    WBC 5.61 01/28/2023    HCT 35.7 (L) 02/23/2023    HCT 34.8 (L) 01/29/2023    HCT 32.2 (L) 01/28/2023     02/23/2023     01/29/2023     01/28/2023     Lab Results   Component Value Date    HGBA1C 5.4 04/26/2021    HGBA1C 5.5 03/08/2018    HGBA1C 5.4 01/23/2018     IMAGING:    Rt PTA BP  116 mmHg   Rt dors pedis A  mmHg   Rt toe BP  88 mmHg   Rt SADAF post tibial (rt post tib A BP / max brach A BP) 1.23   Rt SADAF (rt dors ped A BP / max brach A BP) 1.18   Rt ankle BP / max brach A BP   1.23   Rt TBI (rt toe BP / max brach A BP)    0.94   Lt brachial A syst BP  94 mmHg   Lt PTA BP  102 mmHg   Lt dors pedis A  mmHg   Lt toe BP  101 mmHg   Lt SADAF (lt post tibial A BP / max brach A BP)  1.09    Lt SADAF dors ped (lt dors ped A BP / max brach A BP)    1.13   Lt ankle BP / max brach A BP   1.13   Lt TBI (lt toe BP / max brach A BP)    1.07     PPG Lower   =========     Rt toe BP - PPG    88 mmHg   Rt toe digit waveform: severely dampened   Lt toe BP - PPG    101 mmHg   Lt toe digit waveform: severely dampened     Impression   =========     This was a technically difficult study due to patient having an abnormal cardiac rhythm. The waveforms are difficult to interpret.   Further testing may be needed.     Right Leg: Segmental Pressures suggest no evidence of peripheral arterial occlusive disease. However, PVR waveforms suggest   moderate peripheral arterial occlusive disease.     Left Leg: Segmental Pressures suggest no evidence of peripheral arterial occlusive disease. However, PVR waveforms suggest   moderate peripheral arterial occlusive disease.     -Bilateral Toe PPG's and Pressures were performed.   Rt lower digits: Toe PPG waveforms as described above. - TBI of 0.94 with a Great toe pressure of 88 mmHg is noted.   Lt lower digits: Toe PPG waveforms as described above. - TBI of 1.07 with a Great toe pressure of 101 mmHg is noted.      US R-Groin Soft tissues:  Narrative & Impression    EXAMINATION:  US SOFT TISSUE, GROIN RIGHT     CLINICAL HISTORY:  fluid collection overlying fem aneurysm site;     TECHNIQUE:  Grayscale and color flow Doppler ultrasound evaluation of the subcutaneous soft tissues was performed in the region of concern.     COMPARISON:  CT chest abdomen pelvis 01/20/2023     Right groin soft tissue ultrasound 12/01/2022     FINDINGS:  Limited sonographic imaging at area of concern in the right groin.     Complex multi-septated collection with internal echoes measuring 7.5 x 5.8 x 4.4 cm.  There is no associated vascularity.  Mildly decreased in size compared to recent CT 01/20/2023.     Impression:     Complex multi-septated collection with internal echoes in the right groin.  Findings  concerning for evolving postoperative hematoma.  Recommend clinical correlation.          IMP/PLAN:     57 y.o. male with a h/o infected right common femoral artery pseudoaneurysm s/p resection of R common femoral artery, creation of Right Iliofemoral bypass (end to end)  distal External Iliac to distal femoral artery, and explant of infected dacron femoral conduit with sartorius and gracilis muscle flap creation (plastics).  Followed by ID for abx recommendations.      1) R-groin wound appears well-healed   2) Scheduled for final f/u w/ID in 6 months  3) Return to clinic in 6 months       Signed:  HARJEET Cardoza M.D.   Orthopaedic Surgery, PGY1

## 2023-03-02 NOTE — PROGRESS NOTES
Tim Somers Richards  03/07/2023    HPI:  Doing well.  Stable seroma vs hematoma on serial imaging - not concerning for infection at this time.    Previously:    Patient is a 57 y.o. male with a h/o alcohol and tobacco abuse and CHF with a LVAD.  He was recently hospitalized 06/24/22-09/04/22 for cardiogenic shock requiring [facility]-ECMO.  On 09/22/22 he was found to have purulence of the groin cannulation site and was placed on antibiotics. On 09/24/22 he sat with his legs cross for a prolonged period and when he uncrossed his legs he noticed he was in a pool of blood originating from his ECMO cannulation site.  He returned to the [facility] ED that day and on 09/27 underwent Resection of R common femoral artery, Creation of Right Iliofemoral bypass (end to end)  istal External Iliac to distal femoral artery, Explant of infected dacron femoral conduit with sartorius and rectus muscle flap creation.  He presents today for post-operative evaluation. He primarily complains of right leg weakness and fatigue, however he denies symptoms of claudication or rest pain.   No MI/stroke  Tobacco use: 1pack/wk for about 35 years    Past Medical History:   Diagnosis Date    A-fib     Anticoagulant long-term use     Atrial flutter 7/30/2022    CHF (congestive heart failure)     Class 1 obesity due to excess calories with serious comorbidity and body mass index (BMI) of 31.0 to 31.9 in adult     Class 1 obesity due to excess calories with serious comorbidity and body mass index (BMI) of 32.0 to 32.9 in adult     Dilated cardiomyopathy 1/10/2018    Disorder of kidney and ureter     CKD    Encounter for blood transfusion     Essential hypertension 8/28/2022    Gout     HTN (hypertension)     Hx of psychiatric care     ICD (implantable cardioverter-defibrillator) infection 7/1/2020    Psychiatric problem     Thyroid disease     Ventricular tachycardia (paroxysmal)      Past Surgical History:   Procedure Laterality Date    AORTIC VALVULOPLASTY N/A  7/13/2022    Procedure: REPAIR, AORTIC VALVE;  Surgeon: Yg Kaufman MD;  Location: [facility] OR 2ND FLR;  Service: Cardiovascular;  Laterality: N/A;    APPLICATION OF WOUND VACUUM-ASSISTED CLOSURE DEVICE N/A 7/15/2022    Procedure: APPLICATION, WOUND VAC;  Surgeon: Yg Kaufman MD;  Location: [facility] OR 2ND FLR;  Service: Cardiovascular;  Laterality: N/A;  50 x 5 cm     APPLICATION OF WOUND VACUUM-ASSISTED CLOSURE DEVICE Right 9/27/2022    Procedure: APPLICATION, WOUND VAC;  Surgeon: Kole Tabares MD;  Location: [facility] OR 2ND FLR;  Service: Plastics;  Laterality: Right;    CARDIAC CATHETERIZATION  Dec. 2012    CARDIAC DEFIBRILLATOR PLACEMENT Left     CRRT-D    COLONOSCOPY N/A 3/6/2018    Procedure: COLONOSCOPY;  Surgeon: Alonso Bone MD;  Location: [facility] ENDO (2ND FLR);  Service: Endoscopy;  Laterality: N/A;    COLONOSCOPY N/A 7/17/2019    Procedure: COLONOSCOPY;  Surgeon: Blane Valdez MD;  Location: [facility] ENDO (2ND FLR);  Service: Endoscopy;  Laterality: N/A;    COLONOSCOPY N/A 7/18/2019    Procedure: COLONOSCOPY;  Surgeon: Blane Valdez MD;  Location: [facility] ENDO (2ND FLR);  Service: Endoscopy;  Laterality: N/A;    CREATION OF ILIOFEMORAL ARTERY BYPASS Right 9/27/2022    Procedure: CREATION, BYPASS, ARTERIAL, ILIAC TO FEMORAL WITH GRAFT;  Surgeon: Zach Hernández MD;  [facility];  Service: Peripheral Vascular;  Laterality: Right;    CREATION OF MUSCLE ROTATIONAL FLAP Right 9/27/2022    Procedure: CREATION, FLAP, MUSCLE ROTATION, SARTORIUS AND RECTUS FEMORIS;  Surgeon: Kole Tabares MD;  Location: [facility] OR 2ND FLR;  Service: Plastics;  Laterality: Right;    ESOPHAGOGASTRODUODENOSCOPY N/A 7/17/2019    Procedure: EGD (ESOPHAGOGASTRODUODENOSCOPY);  Surgeon: Blane Valdez MD;  Location: [facility] ENDO (2ND FLR);  Service: Endoscopy;  Laterality: N/A;    ESOPHAGOGASTRODUODENOSCOPY N/A 7/18/2019    Procedure: EGD (ESOPHAGOGASTRODUODENOSCOPY);  Surgeon: Blane Valedz MD;  Location: [facility] ENDO (2ND FLR);   Service: Endoscopy;  Laterality: N/A;    FOREIGN BODY REMOVAL N/A 7/22/2022    Procedure: REMOVAL, FOREIGN BODY;  Surgeon: Yg Kaufman MD;  [facility]  Service: Cardiovascular;  Laterality: N/A;  LVAD Heartmate 2 drive line removal    INSERTION OF GRAFT TO PERICARDIUM N/A 7/15/2022    Procedure: INSERTION, GRAFT, PERICARDIUM;  Surgeon: Yg Kaufman MD;  [facility];  Service: Cardiovascular;  Laterality: N/A;    IRRIGATION OF MEDIASTINUM N/A 7/15/2022    Procedure: IRRIGATION, MEDIASTINUM;  Surgeon: Yg Kaufman MD;  Location: [facility] OR [facility] FLR;  Service: Cardiovascular;  Laterality: N/A;    LYSIS OF ADHESIONS  7/13/2022    Procedure: LYSIS, ADHESIONS;  Surgeon: Yg Kaufman MD;  [facility];  Service: Cardiovascular;;    NONINVASIVE CARDIAC ELECTROPHYSIOLOGY STUDY N/A 10/18/2019    Procedure: CARDIAC ELECTROPHYSIOLOGY STUDY, NONINVASIVE;  Surgeon: Raz Wagner MD;  Location: [facility] EP LAB;  Service: Cardiology;  Laterality: N/A;  VT, DFTs, MDT CRTD in situ, LVAD, juarez MB, [id number]    REPLACEMENT OF IMPLANTABLE CARDIOVERTER-DEFIBRILLATOR (ICD) GENERATOR N/A 3/9/2020    Procedure: REPLACEMENT, ICD GENERATOR;  Surgeon: Harry Yun MD;  Location: [facility] EP LAB;  Service: Cardiology;  Laterality: N/A;  VT, ICD Gen Change and Lead Revision, MDT, MAC, DM,3 Prep    REPLACEMENT OF LEFT VENTRICULAR ASSIST DEVICE (LVAD)  7/13/2022    Procedure: REPLACEMENT, LVAD;  Surgeon: Yg Kaufman MD;  [facility];  Service: Cardiovascular;;    REPLACEMENT OF PUMP N/A 7/13/2022    Procedure: REPLACEMENT, PUMP;  Surgeon: Yg Kaufman MD;  [facility];  Service: Cardiovascular;  Laterality: N/A;  LVAD pump exchange  EXPLANATION OF HEATMATE 2  IMPLANTATION OF HEARTMATE 3  IMPLANTATION OF 8MM CHIMNEY GRAFT TO RFA  INITIATION OF ECMO  TEMPORARY CLOSURE OF CHEST    REVISION OF IMPLANTABLE CARDIOVERTER-DEFIBRILLATOR (ICD) ELECTRODE LEAD PLACEMENT N/A  3/9/2020    Procedure: REVISION, INSERTION, ELECTRODE LEAD, ICD;  Surgeon: Harry Yun MD;  Location: [facility] EP LAB;  Service: Cardiology;  Laterality: N/A;  VT, ICD Gen Change and Lead Revision, MDT, MAC, DM,3 Prep    STERNAL WOUND CLOSURE N/A 2022    Procedure: CLOSURE, WOUND, STERNUM;  Surgeon: Yg Kaufman MD;  Location: [facility] OR [facility] FLR;  Service: Cardiovascular;  Laterality: N/A;  temporary closure  evacuation of hematoma    STERNAL WOUND CLOSURE N/A 7/15/2022    Procedure: CLOSURE, WOUND, STERNUM;  Surgeon: Yg Kaufman MD;  Location: [facility] OR [facility] FLR;  Service: Cardiovascular;  Laterality: N/A;    TRACHEOSTOMY N/A 2022    Procedure: CREATION, TRACHEOSTOMY;  Surgeon: Germain Holt MD;  [facility];  Service: General;  Laterality: N/A;    TREATMENT OF CARDIAC ARRHYTHMIA  10/18/2019    Procedure: Cardioversion or Defibrillation;  Surgeon: Raz Wagner MD;  Location: [facility] EP LAB;  Service: Cardiology;;     Family History   Problem Relation Age of Onset    Hypertension Father     Diabetes Father     Coronary artery disease Father     Heart disease Father         CHF    No Known Problems Mother     Cancer Sister 54        breast CA    No Known Problems Brother     Anxiety disorder Neg Hx     Depression Neg Hx     Dementia Neg Hx     Bipolar disorder Neg Hx     Suicide Neg Hx      Social History     Socioeconomic History    Marital status:     Number of children: 3   Occupational History     Employer: [employer]    Tobacco Use    Smoking status: Former     Packs/day: 1.00     Years: 31.00     Pack years: 31.00     Types: Cigarettes     Quit date: 2018     Years since quittin.1    Smokeless tobacco: Former    Tobacco comments:     pt is quiting on his own - pt stated not qualified for program;  pt  quit on his own   Substance and Sexual Activity    Alcohol use: No     Alcohol/week: 0.0 standard drinks     Comment: quit    Drug use: No    Sexual  activity: Yes     Partners: Female     Birth control/protection: None     Comment: 10/5/17  with same partner 7 years    Social History Narrative    Disabled     Social Determinants of Health     Financial Resource Strain: Unknown    Difficulty of Paying Living Expenses: Patient refused   Food Insecurity: Unknown    Worried About Running Out of Food in the Last Year: Patient refused    Ran Out of Food in the Last Year: Patient refused   Transportation Needs: Unknown    Lack of Transportation (Medical): Patient refused    Lack of Transportation (Non-Medical): Patient refused   Physical Activity: Inactive    Days of Exercise per Week: 0 days    Minutes of Exercise per Session: 0 min   Stress: Stress Concern Present    Feeling of Stress : Rather much   Social Connections: Unknown    Frequency of Communication with Friends and Family: Patient refused    Frequency of Social Gatherings with Friends and Family: Patient refused    Active Member of Clubs or Organizations: No    Attends Club or Organization Meetings: Patient refused    Marital Status: Patient refused   Housing Stability: Unknown    Unable to Pay for Housing in the Last Year: Patient refused    Unstable Housing in the Last Year: Patient refused       Current Outpatient Medications:     ALPRAZolam (XANAX) 1 MG tablet, Take 0.5-1 tablets (0.5-1 mg total) by mouth 2 (two) times daily as needed for Anxiety., Disp: 30 tablet, Rfl: 5    amiodarone (PACERONE) 200 MG Tab, Take 2 tablets (400 mg total) by mouth once daily., Disp: 180 tablet, Rfl: 3    ferrous gluconate (FERGON) 324 MG tablet, Take 1 tablet (324 mg total) by mouth 2 (two) times daily with meals., Disp: 60 tablet, Rfl: 11    furosemide (LASIX) 40 MG tablet, Take 1 tablet (40 mg total) by mouth daily as needed., Disp: 30 tablet, Rfl: 5    levothyroxine (SYNTHROID) 88 MCG tablet, Take 1 tablet (88 mcg total) by mouth before breakfast., Disp: 30 tablet, Rfl: 11    magnesium oxide (MAG-OX) 400 mg  (241.3 mg magnesium) tablet, Take 1 tablet (400 mg total) by mouth 2 (two) times daily., Disp: 90 tablet, Rfl: 11    mexiletine (MEXITIL) 250 MG Cap, Take 1 capsule (250 mg total) by mouth every 8 (eight) hours., Disp: 90 capsule, Rfl: 11    mirtazapine (REMERON) 30 MG tablet, Take 1 tablet (30 mg total) by mouth every evening., Disp: 90 tablet, Rfl: 1    omega-3 acid ethyl esters (LOVAZA) 1 gram capsule, Take 2 capsules (2 g total) by mouth 2 (two) times daily., Disp: 360 capsule, Rfl: 3    pantoprazole (PROTONIX) 40 MG tablet, Take 1 tablet (40 mg total) by mouth daily with lunch., Disp: 90 tablet, Rfl: 3    sodium chloride 0.9% SolP 100 mL with ertapenem 1 gram SolR 1 g, Inject 1 g into the vein once daily., Disp: , Rfl:     voriconazole (VFEND) 200 MG Tab, Take 1 tablet (200 mg total) by mouth 2 (two) times daily., Disp: 60 tablet, Rfl: 2    warfarin (COUMADIN) 5 MG tablet, Take a half tablet (2.5mg) by mouth on 10/5 only. Then take 1 tablet (5mg) daily, Disp: 135 tablet, Rfl: 3    REVIEW OF SYSTEMS:  General: negative; ENT: negative; Allergy and Immunology: negative; Hematological and Lymphatic: Negative; Endocrine: negative; Respiratory: no cough, shortness of breath, or wheezing; Cardiovascular: no chest pain or dyspnea on exertion; Gastrointestinal: no abdominal pain/back, change in bowel habits, or bloody stools; Genito-Urinary: no dysuria, trouble voiding, or hematuria; Musculoskeletal: negative  Neurological: no TIA or stroke symptoms; Psychiatric: no nervousness, anxiety or depression.    PHYSICAL EXAM:         Temp: 98 °F (36.7 °C)      General appearance:  Alert, well-appearing, and in no distress.  Oriented to person, place, and time   Neurological: Normal speech, no focal findings noted; CN II - XII grossly intact           Musculoskeletal: Digits/nail without cyanosis/clubbing.  Normal muscle strength/tone.                 Neck: Supple, no significant adenopathy; thyroid is not enlarged                 Chest:  Clear to auscultation, no wheezes, rales or rhonchi, symmetric air entry     No use of accessory muscles             Cardiac: Normal rate and regular rhythm, S1 and S2 normal; PMI non-displaced          Abdomen: Soft, nontender, nondistended, no masses or organomegaly     No rebound tenderness noted; bowel sounds normal     No groin adenopathy      Extremities:        Mild pre-tibial edema     No ulcerations     R groin incision healed     LAB RESULTS:  Lab Results   Component Value Date    K 4.1 02/23/2023    K 4.0 01/29/2023    K 3.7 01/28/2023    CREATININE 1.6 (H) 02/23/2023    CREATININE 1.5 (H) 01/29/2023    CREATININE 1.5 (H) 01/28/2023     Lab Results   Component Value Date    WBC 3.89 (L) 02/23/2023    WBC 5.31 01/29/2023    WBC 5.61 01/28/2023    HCT 35.7 (L) 02/23/2023    HCT 34.8 (L) 01/29/2023    HCT 32.2 (L) 01/28/2023     02/23/2023     01/29/2023     01/28/2023     Lab Results   Component Value Date    HGBA1C 5.4 04/26/2021    HGBA1C 5.5 03/08/2018    HGBA1C 5.4 01/23/2018     IMAGING:      Bypass Graft Evaluation   ==================     Rt inflow PSV  41 cm/s   Rt prox anastomosis PSV    46 cm/s   Rt prox. anastomosis over rt inflow PSV ratio of graft 1.1   Rt prox graft PSV  47 cm/s   Rt prox. graft over rt prox. anastomosis PSV ratio of graft    1.0   Rt mid graft PSV   30 cm/s   Rt mid. graft over rt prox. graft PSV ratio of graft   0.6   Rt distal graft PSV    47 cm/s   Rt distal graft over rt mid. graft PSV ratio of graft  1.6   Rt distal anastomosis PSV  38 cm/s   Rt distal anastomosis over rt distal graft PSV ratio of graft  0.8   Rt outflow PSV 21 cm/s   Rt outflow over rt distal anastomosis PSV ratio of graft   0.6     Comment   ========     Abnormal flow is consistent with LVAD placement.     Impression   =========     Right leg: Duplex imaging of the right iliofemoral bypass graft reveals patent flow without evidence of stenosis. There is no   sonographic  evidence of a pseudoaneurysm; however, there is a thrombosed hematoma in the right groin measuring 1.5 cm x 1.6 cm.      IMP/PLAN:     57 y.o. male with a h/o infected right common femoral artery pseudoaneurysm s/p resection of R common femoral artery, creation of Right Iliofemoral bypass (end to end)  distal External Iliac to distal femoral artery, and explant of infected dacron femoral conduit with sartorius and gracilis muscle flap creation (plastics).  Followed by ID for abx recommendations.  Doing well.     1) continue rehab  2) R groin wound healing - no evidence of infection  3) RTC in 3 mo with SADAF and R fem graft duplex    Zach Hernández MD  Vascular & Endovascular Surgery

## 2023-03-03 ENCOUNTER — DOCUMENT SCAN (OUTPATIENT)
Dept: HOME HEALTH SERVICES | Facility: HOSPITAL | Age: 58
End: 2023-03-03
Payer: MEDICARE

## 2023-03-09 ENCOUNTER — LAB VISIT (OUTPATIENT)
Dept: LAB | Facility: HOSPITAL | Age: 58
End: 2023-03-09
Attending: INTERNAL MEDICINE
Payer: MEDICARE

## 2023-03-09 ENCOUNTER — ANTI-COAG VISIT (OUTPATIENT)
Dept: CARDIOLOGY | Facility: CLINIC | Age: 58
End: 2023-03-09
Payer: MEDICARE

## 2023-03-09 DIAGNOSIS — Z79.01 LONG TERM (CURRENT) USE OF ANTICOAGULANTS: ICD-10-CM

## 2023-03-09 DIAGNOSIS — Z95.811 LVAD (LEFT VENTRICULAR ASSIST DEVICE) PRESENT: ICD-10-CM

## 2023-03-09 LAB
INR PPP: 2.2 (ref 0.8–1.2)
PROTHROMBIN TIME: 21.8 SEC (ref 9–12.5)

## 2023-03-09 PROCEDURE — 93793 PR ANTICOAGULANT MGMT FOR PT TAKING WARFARIN: ICD-10-PCS | Mod: S$GLB,,,

## 2023-03-09 PROCEDURE — 85610 PROTHROMBIN TIME: CPT | Performed by: INTERNAL MEDICINE

## 2023-03-09 PROCEDURE — 36415 COLL VENOUS BLD VENIPUNCTURE: CPT | Performed by: INTERNAL MEDICINE

## 2023-03-09 PROCEDURE — 93793 ANTICOAG MGMT PT WARFARIN: CPT | Mod: S$GLB,,,

## 2023-03-10 NOTE — PROGRESS NOTES
3/10/23 Patient called requesting to reschedule 3/16 lab appointment to 3/17--has another appointment at [facility] -3/17

## 2023-03-17 ENCOUNTER — LAB VISIT (OUTPATIENT)
Dept: LAB | Facility: HOSPITAL | Age: 58
End: 2023-03-17
Attending: INTERNAL MEDICINE
Payer: MEDICARE

## 2023-03-17 ENCOUNTER — TELEPHONE (OUTPATIENT)
Dept: TRANSPLANT | Facility: CLINIC | Age: 58
End: 2023-03-17
Payer: MEDICARE

## 2023-03-17 ENCOUNTER — OFFICE VISIT (OUTPATIENT)
Dept: PSYCHIATRY | Facility: CLINIC | Age: 58
End: 2023-03-17
Payer: MEDICARE

## 2023-03-17 ENCOUNTER — ANTI-COAG VISIT (OUTPATIENT)
Dept: CARDIOLOGY | Facility: CLINIC | Age: 58
End: 2023-03-17
Payer: MEDICARE

## 2023-03-17 VITALS — BODY MASS INDEX: 29.81 KG/M2 | WEIGHT: 226 LBS

## 2023-03-17 DIAGNOSIS — I50.42 CHRONIC COMBINED SYSTOLIC AND DIASTOLIC HEART FAILURE: Chronic | ICD-10-CM

## 2023-03-17 DIAGNOSIS — Z79.01 LONG TERM (CURRENT) USE OF ANTICOAGULANTS: ICD-10-CM

## 2023-03-17 DIAGNOSIS — G47.00 INSOMNIA, UNSPECIFIED TYPE: ICD-10-CM

## 2023-03-17 DIAGNOSIS — F43.23 ADJUSTMENT DISORDER WITH MIXED ANXIETY AND DEPRESSED MOOD: ICD-10-CM

## 2023-03-17 DIAGNOSIS — F41.1 GENERALIZED ANXIETY DISORDER: Primary | ICD-10-CM

## 2023-03-17 DIAGNOSIS — Z95.811 LVAD (LEFT VENTRICULAR ASSIST DEVICE) PRESENT: ICD-10-CM

## 2023-03-17 LAB
INR PPP: 1.8 (ref 0.8–1.2)
PROTHROMBIN TIME: 17.9 SEC (ref 9–12.5)

## 2023-03-17 PROCEDURE — 99999 PR PBB SHADOW E&M-EST. PATIENT-LVL III: CPT | Mod: PBBFAC,,,

## 2023-03-17 PROCEDURE — 90833 PSYTX W PT W E/M 30 MIN: CPT | Mod: S$GLB,,,

## 2023-03-17 PROCEDURE — 99215 PR OFFICE/OUTPT VISIT, EST, LEVL V, 40-54 MIN: ICD-10-PCS | Mod: S$GLB,,,

## 2023-03-17 PROCEDURE — 90833 PR PSYCHOTHERAPY W/PATIENT W/E&M, 30 MIN (ADD ON): ICD-10-PCS | Mod: S$GLB,,,

## 2023-03-17 PROCEDURE — 93793 ANTICOAG MGMT PT WARFARIN: CPT | Mod: S$GLB,,,

## 2023-03-17 PROCEDURE — 3008F BODY MASS INDEX DOCD: CPT | Mod: CPTII,S$GLB,,

## 2023-03-17 PROCEDURE — 36415 COLL VENOUS BLD VENIPUNCTURE: CPT | Performed by: INTERNAL MEDICINE

## 2023-03-17 PROCEDURE — 1160F PR REVIEW ALL MEDS BY PRESCRIBER/CLIN PHARMACIST DOCUMENTED: ICD-10-PCS | Mod: CPTII,S$GLB,,

## 2023-03-17 PROCEDURE — 93793 PR ANTICOAGULANT MGMT FOR PT TAKING WARFARIN: ICD-10-PCS | Mod: S$GLB,,,

## 2023-03-17 PROCEDURE — 3008F PR BODY MASS INDEX (BMI) DOCUMENTED: ICD-10-PCS | Mod: CPTII,S$GLB,,

## 2023-03-17 PROCEDURE — 85610 PROTHROMBIN TIME: CPT | Performed by: INTERNAL MEDICINE

## 2023-03-17 PROCEDURE — 99999 PR PBB SHADOW E&M-EST. PATIENT-LVL III: ICD-10-PCS | Mod: PBBFAC,,,

## 2023-03-17 PROCEDURE — 1159F PR MEDICATION LIST DOCUMENTED IN MEDICAL RECORD: ICD-10-PCS | Mod: CPTII,S$GLB,,

## 2023-03-17 PROCEDURE — 1159F MED LIST DOCD IN RCRD: CPT | Mod: CPTII,S$GLB,,

## 2023-03-17 PROCEDURE — 1160F RVW MEDS BY RX/DR IN RCRD: CPT | Mod: CPTII,S$GLB,,

## 2023-03-17 PROCEDURE — 99215 OFFICE O/P EST HI 40 MIN: CPT | Mod: S$GLB,,,

## 2023-03-17 RX ORDER — MIRTAZAPINE 30 MG/1
30 TABLET, FILM COATED ORAL NIGHTLY
Qty: 90 TABLET | Refills: 1 | Status: SHIPPED | OUTPATIENT
Start: 2023-03-17 | End: 2023-09-11

## 2023-03-17 RX ORDER — ALPRAZOLAM 1 MG/1
.5-1 TABLET ORAL 2 TIMES DAILY PRN
Qty: 30 TABLET | Refills: 2 | Status: SHIPPED | OUTPATIENT
Start: 2023-03-17 | End: 2023-03-30 | Stop reason: SDUPTHER

## 2023-03-17 NOTE — PROGRESS NOTES
Patient reports correct Warfarin dose, stated he fell on 3/7/23 or 3/8/23, hit his head and has a black eye(right eye), stated he didn't report the fall to LVAD Team but did report it to his plastic surgeon, had spring mix salad 3/13/23, no other changes reported.

## 2023-03-17 NOTE — PROGRESS NOTES
INR not at goal. Medications, chart, and patient findings reviewed. See calendar for adjustments to dose and follow up plan. Called and left message for the VAD coordinator on call, RADHA Guerrier, regarding patient fall.

## 2023-03-17 NOTE — PROGRESS NOTES
"Outpatient Psychiatry Initial Visit   3/17/2023    Tim Rcihards, a 57 y.o. male, presenting for initial evaluation visit. Met with patient.    Reason for Encounter: Referral from Dr. Krueger . Patient complains of anxiety        History of Present Illness:    SUBJECTIVE:   Psych Interview 03/17/2023:   Tim Richards is a 57 y.o. male with past psychiatric history of ALONZO  presented to for initial evaluation and treatment for anxiety .    Pt is A+Ox 4.  Patients mood is "ok", affect appears congruent and appropriate. Pts thought process is normal and logical.  Pts speech is normal tone, normal rate, normal pitch, normal volume   Linear and logical, friendly and cooperative, good eye contact, no psychomotor retardation.  Pt is calmly seated in chair during interview.  Pt is casually dressed and well groomed.  Pt has LVAD bag next to him during clinic visit.      Patient was previously being seen by Dr Krueger for ALONZO, currently taking Remeron 30 and Xanax 0.5mg - 1mg BID daily for panic. Patient has an LVAD and is currently doing OK. Patient states that in 2022 he was hospitalized for three months due to heart failure complications. States that after hospitalization he went through outpatient rehab, states that he had to learn to walk again. Patient states that his support system consists of wife and LVAD team.  States that he has fell two times within the last month, states that both times he fell was due to tripping over his LVAD bag. States that he was not using Xanax at the time of the fall.  Patient states that takes Xanax 1mg two times per week for panic. Patient states that when he takes Xanax he stays seated for 1-2 hours to prevent.      Pt states that his biggest stressor includes possibly losing his disability. Patient states that disability needs to be renewed every six months, states that the disability office has been more adamant about not continuing his disability. Patient states that " this has increase in his anxiety.  Pt states that if he loses his disability he does not know how he will make ends meet. Patient states that due to his medical conditions he is unable to work at this time.    Denies prior hx of psychiatric hospitalizations. Denies hx of suicide attempts. Pt denies hx self harm. Pt denies hx hallucinations.  Pt denies hx of eating disorders.   Pt endorses hx trauma - . Denies physical/sexual abuse. Pt denies symptoms including nightmares, hypervigilance, flashbacks, avoidance behaviors, and disassociation.    Reports depression today as 3/10, and anxiety as 2/10.    Reports sleeping 9 hrs per night, and poor appetite.   Denies SI/HI/AVH. Denies side effects of medications.  Pt states that there support consists of wife  Denies recreational drug use. Pt reports 0 drinks per week, denies tobacco use, denies Vaping, denies Caffeine.      Current Medication:  Xanax 0.5-1 mg Daily     Past meds   Paroxetine   Zoloft     DX:  Pt denies hx symptoms/episodes of miguel.    Admits to symptoms of anxiety including excessive anxiety/worry/fear, more days than not, about numerous issues, difficulty controlling the worry, over thinking, rumination, restlessness, poor concentration, fatigue, and increased irritability. Denies panic attacks at this time.        Review Of Systems:     Review of Systems   Constitutional:  Negative for weight loss.   HENT:  Negative for tinnitus.    Eyes:  Negative for blurred vision.   Respiratory:  Negative for cough and shortness of breath.    Cardiovascular:  Negative for chest pain, palpitations, orthopnea, claudication, leg swelling and PND.   Gastrointestinal:  Negative for abdominal pain.   Genitourinary:  Negative for dysuria.   Musculoskeletal:  Negative for back pain and neck pain.   Skin:  Negative for rash.   Neurological:  Negative for dizziness, seizures and weakness.   Psychiatric/Behavioral:  Negative for depression, hallucinations, memory loss,  substance abuse and suicidal ideas. The patient is not nervous/anxious and does not have insomnia.        Psychiatric Review Of Systems - Is patient experiencing or having changes in:  sleep: no  appetite: no  weight: no  energy/anergy: no  interest/pleasure/anhedonia: no  somatic symptoms: no  libido: no  anxiety/panic: yes  guilty/hopelessness: no  concentration: no  S.I.B.s/risky behavior: no  Irritability: no  Racing thoughts: no  Impulsive behaviors: no  Paranoia: no  AVH: no    Risk Parameters:  Patient reports no suicidal ideation  Patient reports no homicidal ideation  Patient reports no self-injurious behavior  Patient reports no violent behavior    OBJECTIVE     Past Psychiatric History:   Previous Psychiatric Hospitalizations: NO  Previous Medication Trials: YES:      History of psychotherapy:  NO  Previous Suicide Attempts: NO  History of Violence:  NO  History of physical/sexual abuse: NO  Outpatient psychiatric provider(past): NO    Substance Abuse History:   Tobacco: NO  Alcohol: NO  Illicit Substances: NO  Detox/Rehab: NO    Neurological History:   Seizures: NO  Head trauma: YES: falls       Family Psychiatric History: No  Social History:  Developmental/Childhood:Achieved all developmental milestones timely  *Education:High School Diploma  Employment Status/Finances:Disabled   Relationship Status/Sexual Orientation: : Relationship intact  Children: 3  Housing Status: Home    history:  NO  Access to gun: YES:      Taoist: Jainism  Recreational activities: watch TV, work on truck  Person patient is closest to/confides in: Wife    Legal History:   Past Charges/Incarcerations:  No      Past Medical/Surgical History:   Past Medical History:   Diagnosis Date    A-fib     Anticoagulant long-term use     Atrial flutter 7/30/2022    CHF (congestive heart failure)     Class 1 obesity due to excess calories with serious comorbidity and body mass index (BMI) of 31.0 to 31.9 in adult     Class 1  obesity due to excess calories with serious comorbidity and body mass index (BMI) of 32.0 to 32.9 in adult     Dilated cardiomyopathy 1/10/2018    Disorder of kidney and ureter     CKD    Encounter for blood transfusion     Essential hypertension 8/28/2022    Gout     HTN (hypertension)     Hx of psychiatric care     ICD (implantable cardioverter-defibrillator) infection 7/1/2020    Psychiatric problem     Thyroid disease     Ventricular tachycardia (paroxysmal)      Past Surgical History:   Procedure Laterality Date    AORTIC VALVULOPLASTY N/A 7/13/2022    Procedure: REPAIR, AORTIC VALVE;  Surgeon: Yg Kaufman MD;  [facility];  Service: Cardiovascular;  Laterality: N/A;    APPLICATION OF WOUND VACUUM-ASSISTED CLOSURE DEVICE N/A 7/15/2022    Procedure: APPLICATION, WOUND VAC;  Surgeon: Yg Kaufman MD;  [facility];  Service: Cardiovascular;  Laterality: N/A;  50 x 5 cm     APPLICATION OF WOUND VACUUM-ASSISTED CLOSURE DEVICE Right 9/27/2022    Procedure: APPLICATION, WOUND VAC;  Surgeon: Kole Tabares MD;  [facility];  Service: Plastics;  Laterality: Right;    CARDIAC CATHETERIZATION  Dec. 2012    CARDIAC DEFIBRILLATOR PLACEMENT Left     CRRT-D    COLONOSCOPY N/A 3/6/2018    Procedure: COLONOSCOPY;  Surgeon: Alonso Bone MD;  [facility]  Service: Endoscopy;  Laterality: N/A;    COLONOSCOPY N/A 7/17/2019    Procedure: COLONOSCOPY;  Surgeon: Blane Valdez MD;  [facility]  Service: Endoscopy;  Laterality: N/A;    COLONOSCOPY N/A 7/18/2019    Procedure: COLONOSCOPY;  Surgeon: Blane Valdez MD;  [facility]  Service: Endoscopy;  Laterality: N/A;    CREATION OF ILIOFEMORAL ARTERY BYPASS Right 9/27/2022    Procedure: CREATION, BYPASS, ARTERIAL, ILIAC TO FEMORAL WITH GRAFT;  Surgeon: Zach Hernández MD;  [facility];  Service: Peripheral Vascular;  Laterality: Right;    CREATION OF MUSCLE  ROTATIONAL FLAP Right 9/27/2022    Procedure: CREATION, FLAP, MUSCLE ROTATION, SARTORIUS AND RECTUS FEMORIS;  Surgeon: Kole Tabares MD;  Location: [facility] OR 2ND FLR;  Service: Plastics;  Laterality: Right;    ESOPHAGOGASTRODUODENOSCOPY N/A 7/17/2019    Procedure: EGD (ESOPHAGOGASTRODUODENOSCOPY);  Surgeon: Blane Valdez MD;  Location: [facility] ENDO (2ND FLR);  Service: Endoscopy;  Laterality: N/A;    ESOPHAGOGASTRODUODENOSCOPY N/A 7/18/2019    Procedure: EGD (ESOPHAGOGASTRODUODENOSCOPY);  Surgeon: Blane Valdez MD;  Location: [facility] ENDO (2ND FLR);  Service: Endoscopy;  Laterality: N/A;    FOREIGN BODY REMOVAL N/A 7/22/2022    Procedure: REMOVAL, FOREIGN BODY;  Surgeon: Yg Kaufman MD;  Location: [facility] OR [facility] FLR;  Service: Cardiovascular;  Laterality: N/A;  LVAD Heartmate 2 drive line removal    INSERTION OF GRAFT TO PERICARDIUM N/A 7/15/2022    Procedure: INSERTION, GRAFT, PERICARDIUM;  Surgeon: Yg Kaufman MD;  Location: [facility] OR 2ND FLR;  Service: Cardiovascular;  Laterality: N/A;    IRRIGATION OF MEDIASTINUM N/A 7/15/2022    Procedure: IRRIGATION, MEDIASTINUM;  Surgeon: Yg Kaufman MD;  Location: [facility] OR 2ND FLR;  Service: Cardiovascular;  Laterality: N/A;    LYSIS OF ADHESIONS  7/13/2022    Procedure: LYSIS, ADHESIONS;  Surgeon: Yg Kaufman MD;  Location: [facility] OR [facility] FLR;  Service: Cardiovascular;;    NONINVASIVE CARDIAC ELECTROPHYSIOLOGY STUDY N/A 10/18/2019    Procedure: CARDIAC ELECTROPHYSIOLOGY STUDY, NONINVASIVE;  Surgeon: Raz Wagner MD;  Location: [facility] EP LAB;  Service: Cardiology;  Laterality: N/A;  VT, DFTs, MDT CRTD in situ, LVAD, LASHELL pizarro, 3098    REPLACEMENT OF IMPLANTABLE CARDIOVERTER-DEFIBRILLATOR (ICD) GENERATOR N/A 3/9/2020    Procedure: REPLACEMENT, ICD GENERATOR;  Surgeon: Harry Yun MD;  Location: [facility] EP LAB;  Service: Cardiology;  Laterality: N/A;  VT, ICD Gen Change and Lead Revision, MDT, MAC, DM,3 Prep    REPLACEMENT OF LEFT VENTRICULAR ASSIST DEVICE (LVAD)   7/13/2022    Procedure: REPLACEMENT, LVAD;  Surgeon: Yg Kaufman MD;  [facility];  Service: Cardiovascular;;    REPLACEMENT OF PUMP N/A 7/13/2022    Procedure: REPLACEMENT, PUMP;  Surgeon: Yg Kaufman MD;  Location: [facility] OR [facility] FLR;  Service: Cardiovascular;  Laterality: N/A;  LVAD pump exchange  EXPLANATION OF HEATMATE 2  IMPLANTATION OF HEARTMATE 3  IMPLANTATION OF 8MM CHIMNEY GRAFT TO RFA  INITIATION OF ECMO  TEMPORARY CLOSURE OF CHEST    REVISION OF IMPLANTABLE CARDIOVERTER-DEFIBRILLATOR (ICD) ELECTRODE LEAD PLACEMENT N/A 3/9/2020    Procedure: REVISION, INSERTION, ELECTRODE LEAD, ICD;  Surgeon: Harry Yun MD;  Location: [facility] EP LAB;  Service: Cardiology;  Laterality: N/A;  VT, ICD Gen Change and Lead Revision, MDT, MAC, DM,3 Prep    STERNAL WOUND CLOSURE N/A 7/14/2022    Procedure: CLOSURE, WOUND, STERNUM;  Surgeon: Yg Kaufman MD;  [facility];  Service: Cardiovascular;  Laterality: N/A;  temporary closure  evacuation of hematoma    STERNAL WOUND CLOSURE N/A 7/15/2022    Procedure: CLOSURE, WOUND, STERNUM;  Surgeon: Yg Kaufman MD;  [facility];  Service: Cardiovascular;  Laterality: N/A;    TRACHEOSTOMY N/A 8/4/2022    Procedure: CREATION, TRACHEOSTOMY;  Surgeon: Germain Holt MD;  [facility];  Service: General;  Laterality: N/A;    TREATMENT OF CARDIAC ARRHYTHMIA  10/18/2019    Procedure: Cardioversion or Defibrillation;  Surgeon: Raz Wagner MD;  Location: [facility] EP LAB;  Service: Cardiology;;         Current Medications:   Medication List with Changes/Refills   Current Medications    AMIODARONE (PACERONE) 200 MG TAB    Take 2 tablets (400 mg total) by mouth once daily.    FERROUS GLUCONATE (FERGON) 324 MG TABLET    Take 1 tablet (324 mg total) by mouth 2 (two) times daily with meals.    FUROSEMIDE (LASIX) 40 MG TABLET    Take 1 tablet (40 mg total) by mouth daily as needed.    LEVOTHYROXINE (SYNTHROID) 88 MCG TABLET     Take 1 tablet (88 mcg total) by mouth before breakfast.    MAGNESIUM OXIDE (MAG-OX) 400 MG (241.3 MG MAGNESIUM) TABLET    Take 1 tablet (400 mg total) by mouth 2 (two) times daily.    MEXILETINE (MEXITIL) 250 MG CAP    Take 1 capsule (250 mg total) by mouth every 8 (eight) hours.    OMEGA-3 ACID ETHYL ESTERS (LOVAZA) 1 GRAM CAPSULE    Take 2 capsules (2 g total) by mouth 2 (two) times daily.    PANTOPRAZOLE (PROTONIX) 40 MG TABLET    Take 1 tablet (40 mg total) by mouth daily with lunch.    SODIUM CHLORIDE 0.9% SOLP 100 ML WITH ERTAPENEM 1 GRAM SOLR 1 G    Inject 1 g into the vein once daily.    VORICONAZOLE (VFEND) 200 MG TAB    Take 1 tablet (200 mg total) by mouth 2 (two) times daily.    WARFARIN (COUMADIN) 5 MG TABLET    Take a half tablet (2.5mg) by mouth on 10/5 only. Then take 1 tablet (5mg) daily   Changed and/or Refilled Medications    Modified Medication Previous Medication    ALPRAZOLAM (XANAX) 1 MG TABLET ALPRAZolam (XANAX) 1 MG tablet       Take 0.5-1 tablets (0.5-1 mg total) by mouth 2 (two) times daily as needed for Anxiety.    Take 0.5-1 tablets (0.5-1 mg total) by mouth 2 (two) times daily as needed for Anxiety.    MIRTAZAPINE (REMERON) 30 MG TABLET mirtazapine (REMERON) 30 MG tablet       Take 1 tablet (30 mg total) by mouth every evening.    Take 1 tablet (30 mg total) by mouth every evening.       Allergies:   Review of patient's allergies indicates:   Allergen Reactions    Lisinopril Anaphylaxis    Hydralazine     Hydralazine analogues      Chronic constipation, impotence, dizziness         Vitals   There were no vitals filed for this visit.     Labs/Imaging/Studies:   Recent Results (from the past 48 hour(s))   Protime-INR    Collection Time: 03/17/23 12:36 PM   Result Value Ref Range    Prothrombin Time 17.9 (H) 9.0 - 12.5 sec    INR 1.8 (H) 0.8 - 1.2      No results found for: PHENYTOIN, PHENOBARB, VALPROATE, CBMZ      Nutritional Screening: Considering the patient's height and weight,  "medications, medical history and preferences, should a referral be made to the dietitian? no    Constitutional  Vitals:  Most recent vital signs, dated less than 90 days prior to this appointment, were reviewed.    Vitals:    03/17/23 1251   Weight: 102.5 kg (225 lb 15.5 oz)   Pt has an LVAD     General:  unremarkable, age appropriate, casually dressed, neatly groomed     Musculoskeletal  Muscle Strength/Tone:  no spasicity, no rigidity, no cogwheeling, no flaccidity, no paratonia, no dyskinesia, no dystonia, no tremor, no tic, no choreoathetosis, no atrophy   Gait & Station:  non-ataxic       Psychiatric Mental Status Exam:  Arousal: alert  Sensorium/Orientation: oriented to grossly intact, person, place, situation, time/date  Behavior/Cooperation: normal, cooperative, eye contact normal   Speech: normal tone, normal rate, normal pitch, normal volume  Language: grossly intact, able to name, able to repeat  Mood: steady  Affect: congruent and appropriate  Thought Process: normal and logical  Thought Content: concerned with keeping disability  Auditory hallucinations: NO  Visual hallucinations: NO  Paranoia: NO  Delusions:  NO  Suicidal ideation: NO  Homicidal ideation: NO  Attention/Concentration:  spelled "WORLD" forwards and backwards  Memory:    Recent: [facility] Recent Memory: WNL , 3 out of 3 in 3 minutes  Remote: [facility] Remote Memory: WNL , past events, as relates history  Fund of Knowledge: Aware of current events, Intact, and Vocabulary appropriate    Intelligence: [facility] Intelligence: Average, based on history, based on vocabulary, syntax, grammar and content  Insight: {[facility] insight: Fair, understanding severity of illness/history of present illness  Judgment: [facility] Judgement: Fair, per patient's behavior/history of present illness      Relevant Elements of Neurological Exam: normal gait        Laboratory Data  Lab Visit on 03/17/2023   Component Date Value Ref Range Status    Prothrombin Time 03/17/2023 17.9 (H)  " 9.0 - 12.5 sec Final    INR 03/17/2023 1.8 (H)  0.8 - 1.2 Final   Lab Visit on 03/09/2023   Component Date Value Ref Range Status    Prothrombin Time 03/09/2023 21.8 (H)  9.0 - 12.5 sec Final    INR 03/09/2023 2.2 (H)  0.8 - 1.2 Final   Lab Visit on 03/02/2023   Component Date Value Ref Range Status    Prothrombin Time 03/02/2023 20.9 (H)  9.0 - 12.5 sec Final    INR 03/02/2023 2.1 (H)  0.8 - 1.2 Final   Lab Visit on 02/23/2023   Component Date Value Ref Range Status    BNP 02/23/2023 697 (H)  0 - 99 pg/mL Final    Bilirubin, Direct 02/23/2023 0.2  0.1 - 0.3 mg/dL Final    WBC 02/23/2023 3.89 (L)  3.90 - 12.70 K/uL Final    RBC 02/23/2023 4.02 (L)  4.60 - 6.20 M/uL Final    Hemoglobin 02/23/2023 10.4 (L)  14.0 - 18.0 g/dL Final    Hematocrit 02/23/2023 35.7 (L)  40.0 - 54.0 % Final    MCV 02/23/2023 89  82 - 98 fL Final    MCH 02/23/2023 25.9 (L)  27.0 - 31.0 pg Final    MCHC 02/23/2023 29.1 (L)  32.0 - 36.0 g/dL Final    RDW 02/23/2023 16.3 (H)  11.5 - 14.5 % Final    Platelets 02/23/2023 289  150 - 450 K/uL Final    MPV 02/23/2023 11.0  9.2 - 12.9 fL Final    Immature Granulocytes 02/23/2023 0.0  0.0 - 0.5 % Final    Gran # (ANC) 02/23/2023 2.4  1.8 - 7.7 K/uL Final    Immature Grans (Abs) 02/23/2023 0.00  0.00 - 0.04 K/uL Final    Lymph # 02/23/2023 1.0  1.0 - 4.8 K/uL Final    Mono # 02/23/2023 0.4  0.3 - 1.0 K/uL Final    Eos # 02/23/2023 0.1  0.0 - 0.5 K/uL Final    Baso # 02/23/2023 0.02  0.00 - 0.20 K/uL Final    nRBC 02/23/2023 0  0 /100 WBC Final    Gran % 02/23/2023 60.5  38.0 - 73.0 % Final    Lymph % 02/23/2023 25.2  18.0 - 48.0 % Final    Mono % 02/23/2023 10.5  4.0 - 15.0 % Final    Eosinophil % 02/23/2023 3.3  0.0 - 8.0 % Final    Basophil % 02/23/2023 0.5  0.0 - 1.9 % Final    Differential Method 02/23/2023 Automated   Final    Sodium 02/23/2023 138  136 - 145 mmol/L Final    Potassium 02/23/2023 4.1  3.5 - 5.1 mmol/L Final    Chloride 02/23/2023 106  95 - 110 mmol/L Final    CO2 02/23/2023 22 (L)   23 - 29 mmol/L Final    Glucose 02/23/2023 75  70 - 110 mg/dL Final    BUN 02/23/2023 17  6 - 20 mg/dL Final    Creatinine 02/23/2023 1.6 (H)  0.5 - 1.4 mg/dL Final    Calcium 02/23/2023 9.2  8.7 - 10.5 mg/dL Final    Total Protein 02/23/2023 8.1  6.0 - 8.4 g/dL Final    Albumin 02/23/2023 3.4 (L)  3.5 - 5.2 g/dL Final    Total Bilirubin 02/23/2023 0.3  0.1 - 1.0 mg/dL Final    Alkaline Phosphatase 02/23/2023 122  55 - 135 U/L Final    AST 02/23/2023 85 (H)  10 - 40 U/L Final    ALT 02/23/2023 56 (H)  10 - 44 U/L Final    Anion Gap 02/23/2023 10  8 - 16 mmol/L Final    eGFR 02/23/2023 50.3 (A)  >60 mL/min/1.73 m^2 Final    CRP 02/23/2023 22.1 (H)  0.0 - 8.2 mg/L Final    LD 02/23/2023 294 (H)  110 - 260 U/L Final    Magnesium 02/23/2023 2.1  1.6 - 2.6 mg/dL Final    Phosphorus 02/23/2023 2.6 (L)  2.7 - 4.5 mg/dL Final    Prealbumin 02/23/2023 18 (L)  20 - 43 mg/dL Final    Cholesterol 02/23/2023 174  120 - 199 mg/dL Final    Triglycerides 02/23/2023 80  30 - 150 mg/dL Final    HDL 02/23/2023 77 (H)  40 - 75 mg/dL Final    LDL Cholesterol 02/23/2023 81.0  63.0 - 159.0 mg/dL Final    HDL/Cholesterol Ratio 02/23/2023 44.3  20.0 - 50.0 % Final    Total Cholesterol/HDL Ratio 02/23/2023 2.3  2.0 - 5.0 Final    Non-HDL Cholesterol 02/23/2023 97  mg/dL Final    Prothrombin Time 02/23/2023 22.9 (H)  9.0 - 12.5 sec Final    INR 02/23/2023 2.3 (H)  0.8 - 1.2 Final         Medications  Outpatient Encounter Medications as of 3/17/2023   Medication Sig Dispense Refill    ALPRAZolam (XANAX) 1 MG tablet Take 0.5-1 tablets (0.5-1 mg total) by mouth 2 (two) times daily as needed for Anxiety. 30 tablet 2    amiodarone (PACERONE) 200 MG Tab Take 2 tablets (400 mg total) by mouth once daily. 180 tablet 3    ferrous gluconate (FERGON) 324 MG tablet Take 1 tablet (324 mg total) by mouth 2 (two) times daily with meals. 60 tablet 11    furosemide (LASIX) 40 MG tablet Take 1 tablet (40 mg total) by mouth daily as needed. 30 tablet 5     levothyroxine (SYNTHROID) 88 MCG tablet Take 1 tablet (88 mcg total) by mouth before breakfast. 30 tablet 11    magnesium oxide (MAG-OX) 400 mg (241.3 mg magnesium) tablet Take 1 tablet (400 mg total) by mouth 2 (two) times daily. 90 tablet 11    mexiletine (MEXITIL) 250 MG Cap Take 1 capsule (250 mg total) by mouth every 8 (eight) hours. 90 capsule 11    mirtazapine (REMERON) 30 MG tablet Take 1 tablet (30 mg total) by mouth every evening. 90 tablet 1    omega-3 acid ethyl esters (LOVAZA) 1 gram capsule Take 2 capsules (2 g total) by mouth 2 (two) times daily. 360 capsule 3    pantoprazole (PROTONIX) 40 MG tablet Take 1 tablet (40 mg total) by mouth daily with lunch. 90 tablet 3    sodium chloride 0.9% SolP 100 mL with ertapenem 1 gram SolR 1 g Inject 1 g into the vein once daily.      voriconazole (VFEND) 200 MG Tab Take 1 tablet (200 mg total) by mouth 2 (two) times daily. 60 tablet 2    warfarin (COUMADIN) 5 MG tablet Take a half tablet (2.5mg) by mouth on 10/5 only. Then take 1 tablet (5mg) daily 135 tablet 3    [DISCONTINUED] ALPRAZolam (XANAX) 1 MG tablet Take 0.5-1 tablets (0.5-1 mg total) by mouth 2 (two) times daily as needed for Anxiety. 30 tablet 5    [DISCONTINUED] mirtazapine (REMERON) 30 MG tablet Take 1 tablet (30 mg total) by mouth every evening. 90 tablet 1    [DISCONTINUED] sildenafiL (VIAGRA) 100 MG tablet Take 1 tablet (100 mg total) by mouth daily as needed for Erectile Dysfunction. 6 tablet 3     No facility-administered encounter medications on file as of 3/17/2023.           Assessment / Plan:     Diagnosis:      ICD-10-CM ICD-9-CM   1. Generalized anxiety disorder  F41.1 300.02   2. Chronic combined systolic and diastolic heart failure  I50.42 428.42   3. Adjustment disorder with mixed anxiety and depressed mood  F43.23 309.28   4. Insomnia, unspecified type  G47.00 780.52       Strengths and Liabilities: Strength: Patient accepts guidance/feedback, Strength: Patient is intelligent.,  Strength: Patient has reasonable judgment., Liability: Patient is dependent., Liability: Patient has poor health., Liability: Patient lacks coping skills.    Treatment Goals:  Specify outcomes written in observable, behavioral terms:   Anxiety: acquiring relapse prevention skills, reducing negative automatic thoughts, reducing physical symptoms of anxiety, and reducing time spent worrying (<30 minutes/day)    Treatment Plan/Recommendations:     Mood   Continue Remeron 30mg QHS  Continue Xanax 0.5-1mg BID PRN anxiety/panic   Okay to continue short course of Xanax.  Long-term goal is to not continue this medication.  Concern about starting patient on antidepressants due to possible effect on clotting factors. Will refrain from starting any antidepressants at this time.  Patient uses Xanax two times per week, at this time the benefits Outweigh the risk.  patient was extensively educated in the risks of taking Xanax with a history of falls. Patient states that he will not Ambulate two hours after taking Xanax.    I believe patient would benefit from talk therapy at this time. Patient denies the need for talk therapy, will bring up again during next appointment.    Discussed diagnosis, risks and benefits of proposed treatment above vs alternative treatments vs no treatment, and potential side effects of these treatments, and the inherent unpredictability of individual response to treatment.  The patient expresses understanding and gives informed consent to pursue treatment.  The potential benefits outweigh the potential risks. Patient has no other questions. Risks/adverse effects discussed at this time including but not limited to: GI side effects, sexual dysfunction, activation vs sedation, triggering of suicidal thoughts, and serotonin syndrome.   Discussed with patient, the potential adverse effects of Benzodiazepines, including, but not limited to, drowsiness, dizziness, risk of falls, and abuse potential. Counseled  patient on avoiding alcohol, while using this medication, due to the risk of respiratory depression. Patient instructed not to operate any heavy machinery or drive a vehicle while on this medication. Informed patient of the risks of continuous benzodiazepine use, including tolerance, dependence and withdrawals that may be life threatening, upon abrupt cessation. Also advised patient not to take benzodiazepines with opiates and/or other sedatives. The patient expresses an understanding of the above as well as the inherent unpredictability of said treatment.  The patient agrees that, currently, the benefits outweigh the risks, and would like to pursue said treatment at this time.    Serotonin syndrome   Mental status changes can include anxiety, restlessness, disorientation, and agitated delirium.    Autonomic manifestations can include diaphoresis, tachycardia, hyperthermia, hypertension, vomiting, and diarrhea   Neuromuscular hyperactivity can manifest as tremor, myoclonus, hyperreflexia, rigidity, hyperthermia, seizure, and bilateral Babinski sign.   Pt was informed that if they experience any of these symptoms to go the ED.       Difficulty Sleeping Behavioral Modification:  Implement stimulus control: [place] bedroom for sleep only. Leave bedroom when frustrated from not sleeping. Engage in relaxation before returning. Engage in activities during the day. AVOID >7-8 h time in bed  Avoid clock watching  Avoid thinking/worrying about sleep when trying to fall asleep  Limit caffeinated consumption  Make sure the bedroom is dark, quiet and cool    Safety Plan   Patient voices understanding and agreement with this plan  Provided crisis numbers  Encouraged patient to keep future appointments.  Instructed patient to call or message with questions or concerns  In the event of an emergency, including suicidal ideation, patient was advised to go to the emergency room and/or call 911    Return to Clinic: 3  months    Psychotherapy:  Target symptoms: anxiety   Why chosen therapy is appropriate versus another modality: relevant to diagnosis, evidence based practice  Outcome monitoring methods: self-report, observation  Therapeutic intervention type: insight oriented psychotherapy, interactive psychotherapy  Topics discussed/themes: relationships difficulties, stress related to medical comorbidities, difficulty managing affect in interpersonal relationships, building skills sets for symptom management  The patient's response to the intervention is guarded. The patient's progress toward treatment goals is fair.   Duration of intervention: 18 minutes.    Total face to face time: 60 min  Total time (chart review, patient contact, documentation): 78 min    A diagnostic psychiatric evaluation was performed and responsiveness to treatment was assessed.  The patient demonstrates robust ability/capacity to respond to treatment.    Osmar Mejia PA-C      *This note has been prepared using a combination of a dictation device and typing.  It has been checked for errors but some errors may still exist within the note as a result of speech recognition errors and/or typographical errors.

## 2023-03-17 NOTE — TELEPHONE ENCOUNTER
Informed by coumadin clinic that pt fell and hit his head last week. Re-educated on importance of paging VC for falls, especially with head trauma, due to coumadin and increased risk for bleeding. Verbalized understanding. Patient states he has a little bit of a black eye. Denies changes in LOC, AMS, h/a's or blurred vision. Instructed to page immediately if symptoms occur or if he has any further falls

## 2023-03-21 ENCOUNTER — ANTI-COAG VISIT (OUTPATIENT)
Dept: CARDIOLOGY | Facility: CLINIC | Age: 58
End: 2023-03-21
Payer: MEDICARE

## 2023-03-21 ENCOUNTER — LAB VISIT (OUTPATIENT)
Dept: LAB | Facility: HOSPITAL | Age: 58
End: 2023-03-21
Attending: INTERNAL MEDICINE
Payer: MEDICARE

## 2023-03-21 DIAGNOSIS — Z79.01 LONG TERM (CURRENT) USE OF ANTICOAGULANTS: ICD-10-CM

## 2023-03-21 DIAGNOSIS — Z95.811 LVAD (LEFT VENTRICULAR ASSIST DEVICE) PRESENT: ICD-10-CM

## 2023-03-21 LAB
INR PPP: 1.7 (ref 0.8–1.2)
PROTHROMBIN TIME: 17.3 SEC (ref 9–12.5)

## 2023-03-21 PROCEDURE — 36415 COLL VENOUS BLD VENIPUNCTURE: CPT | Performed by: INTERNAL MEDICINE

## 2023-03-21 PROCEDURE — 93793 PR ANTICOAGULANT MGMT FOR PT TAKING WARFARIN: ICD-10-PCS | Mod: S$GLB,,,

## 2023-03-21 PROCEDURE — 85610 PROTHROMBIN TIME: CPT | Performed by: INTERNAL MEDICINE

## 2023-03-21 PROCEDURE — 93793 ANTICOAG MGMT PT WARFARIN: CPT | Mod: S$GLB,,,

## 2023-03-22 NOTE — PROGRESS NOTES
Kelly reports patient missed Warfarin on 3/19/23, took correct dose all other days, stated patient had a [employer] Wine Cooler and a Rum and Coke on 3/19/23(stated she does not want patient to know that she told us), no other changes reported.

## 2023-03-24 ENCOUNTER — ANTI-COAG VISIT (OUTPATIENT)
Dept: CARDIOLOGY | Facility: CLINIC | Age: 58
End: 2023-03-24
Payer: MEDICARE

## 2023-03-24 ENCOUNTER — LAB VISIT (OUTPATIENT)
Dept: LAB | Facility: HOSPITAL | Age: 58
End: 2023-03-24
Attending: INTERNAL MEDICINE
Payer: MEDICARE

## 2023-03-24 DIAGNOSIS — Z95.811 LVAD (LEFT VENTRICULAR ASSIST DEVICE) PRESENT: ICD-10-CM

## 2023-03-24 DIAGNOSIS — Z79.01 LONG TERM (CURRENT) USE OF ANTICOAGULANTS: ICD-10-CM

## 2023-03-24 LAB
INR PPP: 2 (ref 0.8–1.2)
PROTHROMBIN TIME: 19.9 SEC (ref 9–12.5)

## 2023-03-24 PROCEDURE — 85610 PROTHROMBIN TIME: CPT | Performed by: INTERNAL MEDICINE

## 2023-03-24 PROCEDURE — 36415 COLL VENOUS BLD VENIPUNCTURE: CPT | Performed by: INTERNAL MEDICINE

## 2023-03-24 PROCEDURE — 93793 ANTICOAG MGMT PT WARFARIN: CPT | Mod: S$GLB,,,

## 2023-03-24 PROCEDURE — 93793 PR ANTICOAGULANT MGMT FOR PT TAKING WARFARIN: ICD-10-PCS | Mod: S$GLB,,,

## 2023-03-24 NOTE — PROGRESS NOTES
Patient stated he has been having cold symptoms and has been taking Excedrin for his symptoms for 3 days, no other changes reported.

## 2023-03-28 ENCOUNTER — PATIENT MESSAGE (OUTPATIENT)
Dept: TRANSPLANT | Facility: CLINIC | Age: 58
End: 2023-03-28
Payer: MEDICARE

## 2023-03-28 ENCOUNTER — CLINICAL SUPPORT (OUTPATIENT)
Dept: CARDIOLOGY | Facility: HOSPITAL | Age: 58
End: 2023-03-28
Payer: MEDICARE

## 2023-03-28 ENCOUNTER — ANTI-COAG VISIT (OUTPATIENT)
Dept: CARDIOLOGY | Facility: CLINIC | Age: 58
End: 2023-03-28
Payer: MEDICARE

## 2023-03-28 DIAGNOSIS — Z95.810 PRESENCE OF AUTOMATIC (IMPLANTABLE) CARDIAC DEFIBRILLATOR: ICD-10-CM

## 2023-03-28 PROCEDURE — 93793 PR ANTICOAGULANT MGMT FOR PT TAKING WARFARIN: ICD-10-PCS | Mod: S$GLB,,,

## 2023-03-28 PROCEDURE — 93296 REM INTERROG EVL PM/IDS: CPT | Performed by: INTERNAL MEDICINE

## 2023-03-28 PROCEDURE — 93793 ANTICOAG MGMT PT WARFARIN: CPT | Mod: S$GLB,,,

## 2023-03-30 RX ORDER — ALPRAZOLAM 1 MG/1
.5-1 TABLET ORAL 2 TIMES DAILY PRN
Qty: 30 TABLET | Refills: 2 | Status: SHIPPED | OUTPATIENT
Start: 2023-06-15 | End: 2023-10-19 | Stop reason: SDUPTHER

## 2023-04-04 ENCOUNTER — TELEPHONE (OUTPATIENT)
Dept: TRANSPLANT | Facility: CLINIC | Age: 58
End: 2023-04-04
Payer: MEDICARE

## 2023-04-04 ENCOUNTER — ANTI-COAG VISIT (OUTPATIENT)
Dept: CARDIOLOGY | Facility: CLINIC | Age: 58
End: 2023-04-04
Payer: MEDICARE

## 2023-04-04 ENCOUNTER — LAB VISIT (OUTPATIENT)
Dept: LAB | Facility: HOSPITAL | Age: 58
End: 2023-04-04
Attending: INTERNAL MEDICINE
Payer: MEDICARE

## 2023-04-04 DIAGNOSIS — Z79.01 LONG TERM (CURRENT) USE OF ANTICOAGULANTS: ICD-10-CM

## 2023-04-04 DIAGNOSIS — Z95.811 LVAD (LEFT VENTRICULAR ASSIST DEVICE) PRESENT: ICD-10-CM

## 2023-04-04 LAB
INR PPP: 1.5 (ref 0.8–1.2)
PROTHROMBIN TIME: 15.7 SEC (ref 9–12.5)

## 2023-04-04 PROCEDURE — 93793 PR ANTICOAGULANT MGMT FOR PT TAKING WARFARIN: ICD-10-PCS | Mod: S$GLB,,,

## 2023-04-04 PROCEDURE — 85610 PROTHROMBIN TIME: CPT | Performed by: INTERNAL MEDICINE

## 2023-04-04 PROCEDURE — 93793 ANTICOAG MGMT PT WARFARIN: CPT | Mod: S$GLB,,,

## 2023-04-04 PROCEDURE — 36415 COLL VENOUS BLD VENIPUNCTURE: CPT | Performed by: INTERNAL MEDICINE

## 2023-04-04 NOTE — TELEPHONE ENCOUNTER
Spoke with patient regarding equipment maintenance due at upcoming clinic appointment on 4/13/2023.  Asked patient to bring ALL batteries with them to next appointment for maintenance.  Verbalized understanding and agreement.    It is medically necessary to ensure patient has properly functioning equipment and wearables to prevent infection, injury or death to patient.

## 2023-04-04 NOTE — PROGRESS NOTES
Kelly stated patient reminded her after I questioned her that he had cabbage on 4/2/23, 4/3/23 and 4/4/23, no other changes reported.

## 2023-04-04 NOTE — PROGRESS NOTES
Kelly reports correct Warfarin dose, stated patient had salad on 3/31/23 and unsure of type of lettuce, fatigue, decreased appetite, no other changes reported.

## 2023-04-05 ENCOUNTER — PATIENT MESSAGE (OUTPATIENT)
Dept: PSYCHIATRY | Facility: CLINIC | Age: 58
End: 2023-04-05
Payer: MEDICARE

## 2023-04-07 ENCOUNTER — HOSPITAL ENCOUNTER (EMERGENCY)
Facility: HOSPITAL | Age: 58
Discharge: HOME | End: 2023-04-07
Attending: EMERGENCY MEDICINE
Payer: MEDICARE

## 2023-04-07 VITALS
TEMPERATURE: 99 F | HEART RATE: 65 BPM | SYSTOLIC BLOOD PRESSURE: 123 MMHG | RESPIRATION RATE: 20 BRPM | OXYGEN SATURATION: 98 % | DIASTOLIC BLOOD PRESSURE: 72 MMHG

## 2023-04-07 DIAGNOSIS — Z95.811 LVAD (LEFT VENTRICULAR ASSIST DEVICE) PRESENT: ICD-10-CM

## 2023-04-07 DIAGNOSIS — F10.920 ALCOHOLIC INTOXICATION WITHOUT COMPLICATION: Primary | ICD-10-CM
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
## 2023-04-07 PROBLEM — E03.2 HYPOTHYROIDISM DUE TO AMIODARONE: Chronic | Status: ACTIVE | Noted: 2019-11-08

## 2023-04-07 PROBLEM — T46.2X1A HYPOTHYROIDISM DUE TO AMIODARONE: Chronic | Status: ACTIVE | Noted: 2019-11-08

## 2023-04-07 PROBLEM — G47.33 OSA (OBSTRUCTIVE SLEEP APNEA): Chronic | Status: ACTIVE | Noted: 2020-06-05

## 2023-04-07 LAB
ALBUMIN SERPL BCP-MCNC: 3.3 G/DL (ref 3.5–5.2)
ALP SERPL-CCNC: 99 U/L (ref 55–135)
ALT SERPL W/O P-5'-P-CCNC: 18 U/L (ref 10–44)
ANION GAP SERPL CALC-SCNC: 11 MMOL/L (ref 8–16)
AST SERPL-CCNC: 32 U/L (ref 10–40)
BASOPHILS # BLD AUTO: 0.02 K/UL (ref 0–0.2)
BASOPHILS NFR BLD: 0.6 % (ref 0–1.9)
BILIRUB SERPL-MCNC: 0.2 MG/DL (ref 0.1–1)
BNP SERPL-MCNC: 256 PG/ML (ref 0–99)
BUN SERPL-MCNC: 17 MG/DL (ref 6–20)
CALCIUM SERPL-MCNC: 8.4 MG/DL (ref 8.7–10.5)
CHLORIDE SERPL-SCNC: 106 MMOL/L (ref 95–110)
CO2 SERPL-SCNC: 19 MMOL/L (ref 23–29)
CREAT SERPL-MCNC: 1.8 MG/DL (ref 0.5–1.4)
DIFFERENTIAL METHOD: ABNORMAL
EOSINOPHIL # BLD AUTO: 0.1 K/UL (ref 0–0.5)
EOSINOPHIL NFR BLD: 3.2 % (ref 0–8)
ERYTHROCYTE [DISTWIDTH] IN BLOOD BY AUTOMATED COUNT: 16.9 % (ref 11.5–14.5)
EST. GFR  (NO RACE VARIABLE): 43.6 ML/MIN/1.73 M^2
GLUCOSE SERPL-MCNC: 100 MG/DL (ref 70–110)
HCT VFR BLD AUTO: 34.6 % (ref 40–54)
HGB BLD-MCNC: 9.9 G/DL (ref 14–18)
IMM GRANULOCYTES # BLD AUTO: 0.01 K/UL (ref 0–0.04)
IMM GRANULOCYTES NFR BLD AUTO: 0.3 % (ref 0–0.5)
INR PPP: 2.4 (ref 0.8–1.2)
LDH SERPL L TO P-CCNC: 259 U/L (ref 110–260)
LYMPHOCYTES # BLD AUTO: 0.6 K/UL (ref 1–4.8)
LYMPHOCYTES NFR BLD: 15.8 % (ref 18–48)
MAGNESIUM SERPL-MCNC: 2.1 MG/DL (ref 1.6–2.6)
MCH RBC QN AUTO: 26.4 PG (ref 27–31)
MCHC RBC AUTO-ENTMCNC: 28.6 G/DL (ref 32–36)
MCV RBC AUTO: 92 FL (ref 82–98)
MONOCYTES # BLD AUTO: 0.4 K/UL (ref 0.3–1)
MONOCYTES NFR BLD: 10.9 % (ref 4–15)
NEUTROPHILS # BLD AUTO: 2.4 K/UL (ref 1.8–7.7)
NEUTROPHILS NFR BLD: 69.2 % (ref 38–73)
NRBC BLD-RTO: 0 /100 WBC
PLATELET # BLD AUTO: 207 K/UL (ref 150–450)
PMV BLD AUTO: 9.9 FL (ref 9.2–12.9)
POTASSIUM SERPL-SCNC: 3.8 MMOL/L (ref 3.5–5.1)
PROT SERPL-MCNC: 7.3 G/DL (ref 6–8.4)
PROTHROMBIN TIME: 23.7 SEC (ref 9–12.5)
RBC # BLD AUTO: 3.75 M/UL (ref 4.6–6.2)
SODIUM SERPL-SCNC: 136 MMOL/L (ref 136–145)
TROPONIN I SERPL DL<=0.01 NG/ML-MCNC: 0.08 NG/ML (ref 0–0.03)
WBC # BLD AUTO: 3.48 K/UL (ref 3.9–12.7)

## 2023-04-07 PROCEDURE — 85025 COMPLETE CBC W/AUTO DIFF WBC: CPT | Performed by: EMERGENCY MEDICINE

## 2023-04-07 PROCEDURE — 85610 PROTHROMBIN TIME: CPT | Performed by: EMERGENCY MEDICINE

## 2023-04-07 PROCEDURE — 93005 ELECTROCARDIOGRAM TRACING: CPT

## 2023-04-07 PROCEDURE — 93010 ELECTROCARDIOGRAM REPORT: CPT | Mod: ,,, | Performed by: INTERNAL MEDICINE

## 2023-04-07 PROCEDURE — 83735 ASSAY OF MAGNESIUM: CPT | Performed by: EMERGENCY MEDICINE

## 2023-04-07 PROCEDURE — 83615 LACTATE (LD) (LDH) ENZYME: CPT | Performed by: EMERGENCY MEDICINE

## 2023-04-07 PROCEDURE — 99284 PR EMERGENCY DEPT VISIT,LEVEL IV: ICD-10-PCS | Mod: GC,,, | Performed by: EMERGENCY MEDICINE

## 2023-04-07 PROCEDURE — 99284 EMERGENCY DEPT VISIT MOD MDM: CPT | Mod: GC,,, | Performed by: EMERGENCY MEDICINE

## 2023-04-07 PROCEDURE — 93010 EKG 12-LEAD: ICD-10-PCS | Mod: ,,, | Performed by: INTERNAL MEDICINE

## 2023-04-07 PROCEDURE — 99285 EMERGENCY DEPT VISIT HI MDM: CPT | Mod: 25

## 2023-04-07 PROCEDURE — 83880 ASSAY OF NATRIURETIC PEPTIDE: CPT | Performed by: EMERGENCY MEDICINE

## 2023-04-07 PROCEDURE — 84484 ASSAY OF TROPONIN QUANT: CPT | Performed by: EMERGENCY MEDICINE

## 2023-04-07 PROCEDURE — 80053 COMPREHEN METABOLIC PANEL: CPT | Performed by: EMERGENCY MEDICINE

## 2023-04-07 NOTE — ED TRIAGE NOTES
Pt brought to ED via [facility] from home. Pt has a LVAD and states that he was having palpitations earlier in the evening and had a few drinks and took a Xanax. Pt wife states that they went to dinner and pt became intoxicated and had to be assisted to car. Pt states that he has fallen several times over the last few days. PT fell tonight on his right elbow but denies LOC. Pt states he does not currently have palpitations or chest pain.

## 2023-04-07 NOTE — ED PROVIDER NOTES
Encounter Date: 4/7/2023       History     Chief Complaint   Patient presents with    Palpitations     LVAD Pt. Palpitations started before dinner, fell a couple of times. +ETOH      57-year-old male with a history of cardiomyopathy status post 2nd LVAD placement, and alcohol use disorder, thyroid disease, and paroxysmal ventricular tachycardia presents with a chief complaint of palpitations.  The patient says that he has longstanding issues with palpitations.  His wife, who is at bedside said that they were eating a nice dinner this evening the patient became intoxicated and fell multiple times, the 3rd time she was unable to get the patient off the pavement.  She says that he was recently in rehab for a month after having reconstructive surgery for a vein in his left leg, so he has pre-existing weakness.  The patient is denying any recent febrile illnesses, facial droop, vision changes, or dizziness.      Review of patient's allergies indicates:   Allergen Reactions    Lisinopril Anaphylaxis    Hydralazine     Hydralazine analogues      Chronic constipation, impotence, dizziness     Past Medical History:   Diagnosis Date    A-fib     Anticoagulant long-term use     Atrial flutter 7/30/2022    CHF (congestive heart failure)     Class 1 obesity due to excess calories with serious comorbidity and body mass index (BMI) of 31.0 to 31.9 in adult     Class 1 obesity due to excess calories with serious comorbidity and body mass index (BMI) of 32.0 to 32.9 in adult     Dilated cardiomyopathy 1/10/2018    Disorder of kidney and ureter     CKD    Encounter for blood transfusion     Essential hypertension 8/28/2022    Gout     HTN (hypertension)     Hx of psychiatric care     ICD (implantable cardioverter-defibrillator) infection 7/1/2020    Psychiatric problem     Thyroid disease     Ventricular tachycardia (paroxysmal)      Past Surgical History:   Procedure Laterality Date    AORTIC VALVULOPLASTY N/A 7/13/2022     Procedure: REPAIR, AORTIC VALVE;  Surgeon: Yg Kaufman MD;  Location: [facility] OR 2ND FLR;  Service: Cardiovascular;  Laterality: N/A;    APPLICATION OF WOUND VACUUM-ASSISTED CLOSURE DEVICE N/A 7/15/2022    Procedure: APPLICATION, WOUND VAC;  Surgeon: Yg Kaufman MD;  Location: [facility] OR 2ND FLR;  Service: Cardiovascular;  Laterality: N/A;  50 x 5 cm     APPLICATION OF WOUND VACUUM-ASSISTED CLOSURE DEVICE Right 9/27/2022    Procedure: APPLICATION, WOUND VAC;  Surgeon: Kole Tabares MD;  Location: [facility] OR 2ND FLR;  Service: Plastics;  Laterality: Right;    CARDIAC CATHETERIZATION  Dec. 2012    CARDIAC DEFIBRILLATOR PLACEMENT Left     CRRT-D    COLONOSCOPY N/A 3/6/2018    Procedure: COLONOSCOPY;  Surgeon: Alonso Bone MD;  Location: [facility] ENDO (2ND FLR);  Service: Endoscopy;  Laterality: N/A;    COLONOSCOPY N/A 7/17/2019    Procedure: COLONOSCOPY;  Surgeon: Blane Valdez MD;  Location: [facility] ENDO (2ND FLR);  Service: Endoscopy;  Laterality: N/A;    COLONOSCOPY N/A 7/18/2019    Procedure: COLONOSCOPY;  Surgeon: Blane Valdez MD;  Location: [facility] ENDO (2ND FLR);  Service: Endoscopy;  Laterality: N/A;    CREATION OF ILIOFEMORAL ARTERY BYPASS Right 9/27/2022    Procedure: CREATION, BYPASS, ARTERIAL, ILIAC TO FEMORAL WITH GRAFT;  Surgeon: Zach Hernández MD;  Location: [facility] OR 2ND FLR;  Service: Peripheral Vascular;  Laterality: Right;    CREATION OF MUSCLE ROTATIONAL FLAP Right 9/27/2022    Procedure: CREATION, FLAP, MUSCLE ROTATION, SARTORIUS AND RECTUS FEMORIS;  Surgeon: Kole Tabares MD;  Location: [facility] OR 2ND FLR;  Service: Plastics;  Laterality: Right;    ESOPHAGOGASTRODUODENOSCOPY N/A 7/17/2019    Procedure: EGD (ESOPHAGOGASTRODUODENOSCOPY);  Surgeon: Blane Valdez MD;  Location: [facility] ENDO (2ND FLR);  Service: Endoscopy;  Laterality: N/A;    ESOPHAGOGASTRODUODENOSCOPY N/A 7/18/2019    Procedure: EGD (ESOPHAGOGASTRODUODENOSCOPY);  Surgeon: Blane Valdez MD;  Location: [facility] ENDO (2ND FLR);  Service:  Endoscopy;  Laterality: N/A;    FOREIGN BODY REMOVAL N/A 7/22/2022    Procedure: REMOVAL, FOREIGN BODY;  Surgeon: Yg Kaufman MD;  [facility];  Service: Cardiovascular;  Laterality: N/A;  LVAD Heartmate 2 drive line removal    INSERTION OF GRAFT TO PERICARDIUM N/A 7/15/2022    Procedure: INSERTION, GRAFT, PERICARDIUM;  Surgeon: Yg Kaufman MD;  [facility];  Service: Cardiovascular;  Laterality: N/A;    IRRIGATION OF MEDIASTINUM N/A 7/15/2022    Procedure: IRRIGATION, MEDIASTINUM;  Surgeon: Yg Kaufman MD;  Location: [facility] OR [facility] FLR;  Service: Cardiovascular;  Laterality: N/A;    LYSIS OF ADHESIONS  7/13/2022    Procedure: LYSIS, ADHESIONS;  Surgeon: Yg Kaufman MD;  [facility];  Service: Cardiovascular;;    NONINVASIVE CARDIAC ELECTROPHYSIOLOGY STUDY N/A 10/18/2019    Procedure: CARDIAC ELECTROPHYSIOLOGY STUDY, NONINVASIVE;  Surgeon: Raz Wagner MD;  Location: [facility] EP LAB;  Service: Cardiology;  Laterality: N/A;  VT, DFTs, MDT CRTD in situ, LVAD, anes, MB, 3098    REPLACEMENT OF IMPLANTABLE CARDIOVERTER-DEFIBRILLATOR (ICD) GENERATOR N/A 3/9/2020    Procedure: REPLACEMENT, ICD GENERATOR;  Surgeon: Harry Yun MD;  Location: [facility] EP LAB;  Service: Cardiology;  Laterality: N/A;  VT, ICD Gen Change and Lead Revision, MDT, MAC, DM,3 Prep    REPLACEMENT OF LEFT VENTRICULAR ASSIST DEVICE (LVAD)  7/13/2022    Procedure: REPLACEMENT, LVAD;  Surgeon: Yg Kaufman MD;  [facility];  Service: Cardiovascular;;    REPLACEMENT OF PUMP N/A 7/13/2022    Procedure: REPLACEMENT, PUMP;  Surgeon: Yg Kaufman MD;  [facility];  Service: Cardiovascular;  Laterality: N/A;  LVAD pump exchange  EXPLANATION OF HEATMATE 2  IMPLANTATION OF HEARTMATE 3  IMPLANTATION OF 8MM CHIMNEY GRAFT TO RFA  INITIATION OF ECMO  TEMPORARY CLOSURE OF CHEST    REVISION OF IMPLANTABLE CARDIOVERTER-DEFIBRILLATOR (ICD) ELECTRODE LEAD PLACEMENT N/A 3/9/2020     Procedure: REVISION, INSERTION, ELECTRODE LEAD, ICD;  Surgeon: Harry Yun MD;  Location: [facility] EP LAB;  Service: Cardiology;  Laterality: N/A;  VT, ICD Gen Change and Lead Revision, MDT, MAC, DM,3 Prep    STERNAL WOUND CLOSURE N/A 2022    Procedure: CLOSURE, WOUND, STERNUM;  Surgeon: Yg Kaufman MD;  Location: [facility] OR [facility] FLR;  Service: Cardiovascular;  Laterality: N/A;  temporary closure  evacuation of hematoma    STERNAL WOUND CLOSURE N/A 7/15/2022    Procedure: CLOSURE, WOUND, STERNUM;  Surgeon: Yg Kaufman MD;  Location: [facility] OR [facility] FLR;  Service: Cardiovascular;  Laterality: N/A;    TRACHEOSTOMY N/A 2022    Procedure: CREATION, TRACHEOSTOMY;  Surgeon: Germain Holt MD;  [facility];  Service: General;  Laterality: N/A;    TREATMENT OF CARDIAC ARRHYTHMIA  10/18/2019    Procedure: Cardioversion or Defibrillation;  Surgeon: Raz Wagner MD;  Location: [facility] EP LAB;  Service: Cardiology;;     Family History   Problem Relation Age of Onset    Hypertension Father     Diabetes Father     Coronary artery disease Father     Heart disease Father         CHF    No Known Problems Mother     Cancer Sister 54        breast CA    No Known Problems Brother     Anxiety disorder Neg Hx     Depression Neg Hx     Dementia Neg Hx     Bipolar disorder Neg Hx     Suicide Neg Hx      Social History     Tobacco Use    Smoking status: Former     Packs/day: 1.00     Years: 31.00     Pack years: 31.00     Types: Cigarettes     Quit date: 2018     Years since quittin.2    Smokeless tobacco: Former    Tobacco comments:     pt is quiting on his own - pt stated not qualified for program;  pt  quit on his own   Substance Use Topics    Alcohol use: Yes     Comment: quit    Drug use: No     Review of Systems    Physical Exam     Initial Vitals   BP Pulse Resp Temp SpO2   23 0231 23 0231 23 0231 23 0232 23 0231   123/72 78 18 97.4 °F (36.3 °C) 99 %      MAP        --                Physical Exam    Nursing note and vitals reviewed.  Constitutional: He appears well-developed and well-nourished.   HENT:   Head: Normocephalic and atraumatic.   Eyes: EOM are normal. Pupils are equal, round, and reactive to light.   Neck: Neck supple.   There is no midline C-spine tenderness   Normal range of motion.  Cardiovascular:            There is an LVAD hum heard over the chest   Pulmonary/Chest: Breath sounds normal. He has no wheezes. He has no rhonchi. He has no rales.   Abdominal: Abdomen is soft. There is no abdominal tenderness. There is no rebound and no guarding.   Musculoskeletal:         General: Tenderness present. No edema. Normal range of motion.      Cervical back: Normal range of motion and neck supple.      Comments: Is tenderness over the right elbow, patient has pain with full flexion of the elbow, all other joints ranged and palpated without pain.  Patient has no tenderness along the spine.     Neurological: He is alert and oriented to person, place, and time. He has normal strength.   Skin: Skin is warm and dry. Capillary refill takes less than 2 seconds. No rash noted. No erythema. No pallor.   Psychiatric: He has a normal mood and affect. His speech is normal and behavior is normal. Judgment and thought content normal. Cognition and memory are impaired.   Patient appears clinically intoxicated       ED Course   Procedures  Labs Reviewed   CBC W/ AUTO DIFFERENTIAL - Abnormal; Notable for the following components:       Result Value    WBC 3.48 (*)     RBC 3.75 (*)     Hemoglobin 9.9 (*)     Hematocrit 34.6 (*)     MCH 26.4 (*)     MCHC 28.6 (*)     RDW 16.9 (*)     Lymph # 0.6 (*)     Lymph % 15.8 (*)     All other components within normal limits   COMPREHENSIVE METABOLIC PANEL - Abnormal; Notable for the following components:    CO2 19 (*)     Creatinine 1.8 (*)     Calcium 8.4 (*)     Albumin 3.3 (*)     eGFR 43.6 (*)     All other components within normal limits    TROPONIN I - Abnormal; Notable for the following components:    Troponin I 0.082 (*)     All other components within normal limits   B-TYPE NATRIURETIC PEPTIDE - Abnormal; Notable for the following components:     (*)     All other components within normal limits   PROTIME-INR - Abnormal; Notable for the following components:    Prothrombin Time 23.7 (*)     INR 2.4 (*)     All other components within normal limits   LACTATE DEHYDROGENASE   MAGNESIUM   POCT TROPONIN          Imaging Results              CT Head Without Contrast (In process)                      X-Ray Elbow Complete Right (Final result)  Result time 04/07/23 03:57:58      Final result by Danyel Arteaga MD (04/07/23 03:57:58)                   Impression:      No acute fracture.      Electronically signed by: Danyel Arteaga MD  Date:    04/07/2023  Time:    03:57               Narrative:    EXAMINATION:  XR ELBOW COMPLETE 3 VIEW RIGHT    CLINICAL HISTORY:  . Unspecified fall, initial encounter    TECHNIQUE:  AP, lateral, and radial head views of the right elbow were performed.    COMPARISON:  None    FINDINGS:  No fracture or dislocation.  No fat pad elevation.  Cartilage spaces are maintained.  Soft tissues are unremarkable.                                       X-Ray Chest AP Portable (Final result)  Result time 04/07/23 03:41:05      Final result by Danyel Arteaga MD (04/07/23 03:41:05)                   Impression:      Cardiomegaly with perihilar edema.      Electronically signed by: Danyel Arteaga MD  Date:    04/07/2023  Time:    03:41               Narrative:    EXAMINATION:  XR CHEST AP PORTABLE    CLINICAL HISTORY:  Chest Pain;    TECHNIQUE:  Single frontal view of the chest was performed.    COMPARISON:  09/29/2022.    FINDINGS:  Sternotomy wires, ICD lead and partially visualized LVAD, similar to prior.    No consolidation, right pleural effusion or pneumothorax.  Left CP angle obscured by overlying  device.    Cardiomegaly with perihilar edema.                                       Medications - No data to display  Medical Decision Making:   History:   Old Medical Records: I decided to obtain old medical records.  Old Records Summarized: records from clinic visits and records from previous admission(s).       <> Summary of Records: Prior records reviewed for past medical history and current medications  Initial Assessment:   57-year-old male in no acute distress.  Patient appears clinically intoxicated, patient has some right elbow tenderness and pain with flexion, however there is no focal neurologic deficit.  Twelve lead EKG is distorted, however the strip demonstrates regular rhythm.  Patient denies any acute pain at rest.  Differential Diagnosis:   ICH vs alcohol intoxication vs dysrhythmia  Clinical Tests:   Lab Tests: Reviewed  Radiological Study: Reviewed  Medical Tests: Reviewed  ED Management:  Labs showed troponin said the patient's baseline, BNP was improved from last presentation, INR is therapeutic, patient has creatinine of 1.8 which is up from 1.61 month prior.  All other labs were similar to the patient's baseline.    Head CT was negative for any acute intracranial process, x-ray of the right elbow showed no fracture or dislocation.    I reached out to the all pad coordinator who agreed patient is appropriate for discharge.  She said they will follow-up with him next week in clinic.    I discussed our findings with the patient and his wife at bedside.  I discussed return precautions with the patient, provided him with discharge paperwork and the patient was discharged in stable condition.          Attending Attestation:   Physician Attestation Statement for Resident:  As the supervising MD   Physician Attestation Statement: I have personally seen and examined this patient.   I agree with the above history.  -:   As the supervising MD I agree with the above PE.     As the supervising MD I agree  with the above treatment, course, plan, and disposition.    I have reviewed and agree with the residents interpretation of the following: lab data, x-rays and EKG.  I have reviewed the following: old records at this facility.              ED Course as of 04/07/23 0541   Fri Apr 07, 2023   0410 INR(!): 2.4  therapeutic [BP]   0411 Troponin I(!): 0.082  At baseline [BP]   0430 BNP(!): 256  Improved from prior [BP]      ED Course User Index  [BP] Aristeo Mathis MD                 Clinical Impression:   Final diagnoses:  [R07.9] Chest pain  [R00.2] Palpitations (Primary)  [Z95.811] LVAD (left ventricular assist device) present  [F10.920] Alcoholic intoxication without complication  [W19.XXXA] Fall, initial encounter  [W19.XXXA] Fall        ED Disposition Condition    Discharge Stable          ED Prescriptions    None       Follow-up Information       Follow up With Specialties Details Why Contact Info Additional Information    Diego Daniel MD Family Medicine Schedule an appointment as soon as possible for a visit   [address] [facility]  [place] [address]  [phone]       [id number] Schedule an appointment as soon as possible for a visit   [address] [facility] [address] [phone] Cardiology Services Clinics - Main Building, [facility] 3rd Floor Please park in [facility] and use Atrium elevator             Aristeo Mathis MD  Resident  04/07/23 0541       Cassy Beckett MD  04/07/23 4624

## 2023-04-10 ENCOUNTER — ANTI-COAG VISIT (OUTPATIENT)
Dept: CARDIOLOGY | Facility: CLINIC | Age: 58
End: 2023-04-10
Payer: MEDICARE

## 2023-04-10 ENCOUNTER — LAB VISIT (OUTPATIENT)
Dept: LAB | Facility: HOSPITAL | Age: 58
End: 2023-04-10
Attending: INTERNAL MEDICINE
Payer: MEDICARE

## 2023-04-10 DIAGNOSIS — Z79.01 LONG TERM (CURRENT) USE OF ANTICOAGULANTS: ICD-10-CM

## 2023-04-10 DIAGNOSIS — Z95.811 LVAD (LEFT VENTRICULAR ASSIST DEVICE) PRESENT: ICD-10-CM

## 2023-04-10 LAB
INR PPP: 2.3 (ref 0.8–1.2)
PROTHROMBIN TIME: 23.5 SEC (ref 9–12.5)

## 2023-04-10 PROCEDURE — 93793 PR ANTICOAGULANT MGMT FOR PT TAKING WARFARIN: ICD-10-PCS | Mod: S$GLB,,,

## 2023-04-10 PROCEDURE — 36415 COLL VENOUS BLD VENIPUNCTURE: CPT | Performed by: INTERNAL MEDICINE

## 2023-04-10 PROCEDURE — 85610 PROTHROMBIN TIME: CPT | Performed by: INTERNAL MEDICINE

## 2023-04-10 PROCEDURE — 93793 ANTICOAG MGMT PT WARFARIN: CPT | Mod: S$GLB,,,

## 2023-04-13 ENCOUNTER — OFFICE VISIT (OUTPATIENT)
Dept: TRANSPLANT | Facility: CLINIC | Age: 58
End: 2023-04-13
Attending: INTERNAL MEDICINE
Payer: MEDICARE

## 2023-04-13 ENCOUNTER — CLINICAL SUPPORT (OUTPATIENT)
Dept: TRANSPLANT | Facility: CLINIC | Age: 58
End: 2023-04-13
Payer: MEDICARE

## 2023-04-13 ENCOUNTER — LAB VISIT (OUTPATIENT)
Dept: LAB | Facility: HOSPITAL | Age: 58
End: 2023-04-13
Attending: INTERNAL MEDICINE
Payer: MEDICARE

## 2023-04-13 VITALS
BODY MASS INDEX: 28.89 KG/M2 | WEIGHT: 218 LBS | HEIGHT: 73 IN | SYSTOLIC BLOOD PRESSURE: 98 MMHG | HEART RATE: 80 BPM | TEMPERATURE: 98 F

## 2023-04-13 DIAGNOSIS — I50.42 CHRONIC COMBINED SYSTOLIC AND DIASTOLIC CONGESTIVE HEART FAILURE: Chronic | ICD-10-CM

## 2023-04-13 DIAGNOSIS — Z79.01 ANTICOAGULATION MONITORING, INR RANGE 2-3: ICD-10-CM

## 2023-04-13 DIAGNOSIS — Z95.811 HEART REPLACED BY HEART ASSIST DEVICE: ICD-10-CM

## 2023-04-13 DIAGNOSIS — T46.2X5A HYPERTHYROIDISM DUE TO AMIODARONE: ICD-10-CM

## 2023-04-13 DIAGNOSIS — E66.3 OVERWEIGHT WITH BODY MASS INDEX (BMI) OF 28 TO 28.9 IN ADULT: ICD-10-CM

## 2023-04-13 DIAGNOSIS — R26.89 BALANCE PROBLEM: ICD-10-CM

## 2023-04-13 DIAGNOSIS — R53.81 PHYSICAL DEBILITY: ICD-10-CM

## 2023-04-13 DIAGNOSIS — R26.89 POOR BALANCE: ICD-10-CM

## 2023-04-13 DIAGNOSIS — N28.9 RENAL INSUFFICIENCY: ICD-10-CM

## 2023-04-13 DIAGNOSIS — Z95.811 LVAD (LEFT VENTRICULAR ASSIST DEVICE) PRESENT: Primary | Chronic | ICD-10-CM

## 2023-04-13 DIAGNOSIS — Z95.811 HEART REPLACED BY HEART ASSIST DEVICE: Primary | ICD-10-CM

## 2023-04-13 DIAGNOSIS — Z95.811 LVAD (LEFT VENTRICULAR ASSIST DEVICE) PRESENT: Chronic | ICD-10-CM

## 2023-04-13 DIAGNOSIS — E05.80 HYPERTHYROIDISM DUE TO AMIODARONE: ICD-10-CM

## 2023-04-13 LAB
ALBUMIN SERPL BCP-MCNC: 3.5 G/DL (ref 3.5–5.2)
ALP SERPL-CCNC: 89 U/L (ref 55–135)
ALT SERPL W/O P-5'-P-CCNC: 13 U/L (ref 10–44)
ANION GAP SERPL CALC-SCNC: 10 MMOL/L (ref 8–16)
AST SERPL-CCNC: 22 U/L (ref 10–40)
BASOPHILS # BLD AUTO: 0.03 K/UL (ref 0–0.2)
BASOPHILS NFR BLD: 1 % (ref 0–1.9)
BILIRUB DIRECT SERPL-MCNC: 0.2 MG/DL (ref 0.1–0.3)
BILIRUB SERPL-MCNC: 0.3 MG/DL (ref 0.1–1)
BNP SERPL-MCNC: 314 PG/ML (ref 0–99)
BUN SERPL-MCNC: 18 MG/DL (ref 6–20)
CALCIUM SERPL-MCNC: 9.4 MG/DL (ref 8.7–10.5)
CHLORIDE SERPL-SCNC: 105 MMOL/L (ref 95–110)
CO2 SERPL-SCNC: 23 MMOL/L (ref 23–29)
CREAT SERPL-MCNC: 1.9 MG/DL (ref 0.5–1.4)
CRP SERPL-MCNC: 26.2 MG/L (ref 0–8.2)
DIFFERENTIAL METHOD: ABNORMAL
EOSINOPHIL # BLD AUTO: 0.2 K/UL (ref 0–0.5)
EOSINOPHIL NFR BLD: 6.5 % (ref 0–8)
ERYTHROCYTE [DISTWIDTH] IN BLOOD BY AUTOMATED COUNT: 16.9 % (ref 11.5–14.5)
EST. GFR  (NO RACE VARIABLE): 40.9 ML/MIN/1.73 M^2
GLUCOSE SERPL-MCNC: 77 MG/DL (ref 70–110)
HCT VFR BLD AUTO: 38 % (ref 40–54)
HGB BLD-MCNC: 11.1 G/DL (ref 14–18)
IMM GRANULOCYTES # BLD AUTO: 0.01 K/UL (ref 0–0.04)
IMM GRANULOCYTES NFR BLD AUTO: 0.3 % (ref 0–0.5)
INR PPP: 2.3 (ref 0.8–1.2)
LDH SERPL L TO P-CCNC: 204 U/L (ref 110–260)
LYMPHOCYTES # BLD AUTO: 0.7 K/UL (ref 1–4.8)
LYMPHOCYTES NFR BLD: 22.2 % (ref 18–48)
MAGNESIUM SERPL-MCNC: 2.2 MG/DL (ref 1.6–2.6)
MCH RBC QN AUTO: 26.6 PG (ref 27–31)
MCHC RBC AUTO-ENTMCNC: 29.2 G/DL (ref 32–36)
MCV RBC AUTO: 91 FL (ref 82–98)
MONOCYTES # BLD AUTO: 0.4 K/UL (ref 0.3–1)
MONOCYTES NFR BLD: 15 % (ref 4–15)
NEUTROPHILS # BLD AUTO: 1.6 K/UL (ref 1.8–7.7)
NEUTROPHILS NFR BLD: 55 % (ref 38–73)
NRBC BLD-RTO: 0 /100 WBC
PHOSPHATE SERPL-MCNC: 2.5 MG/DL (ref 2.7–4.5)
PLATELET # BLD AUTO: 229 K/UL (ref 150–450)
PMV BLD AUTO: 10.1 FL (ref 9.2–12.9)
POTASSIUM SERPL-SCNC: 4 MMOL/L (ref 3.5–5.1)
PREALB SERPL-MCNC: 17 MG/DL (ref 20–43)
PROT SERPL-MCNC: 8.1 G/DL (ref 6–8.4)
PROTHROMBIN TIME: 23 SEC (ref 9–12.5)
RBC # BLD AUTO: 4.18 M/UL (ref 4.6–6.2)
SODIUM SERPL-SCNC: 138 MMOL/L (ref 136–145)
WBC # BLD AUTO: 2.93 K/UL (ref 3.9–12.7)

## 2023-04-13 PROCEDURE — 3008F PR BODY MASS INDEX (BMI) DOCUMENTED: ICD-10-PCS | Mod: CPTII,S$GLB,, | Performed by: INTERNAL MEDICINE

## 2023-04-13 PROCEDURE — 99999 PR PBB SHADOW E&M-EST. PATIENT-LVL IV: ICD-10-PCS | Mod: PBBFAC,,, | Performed by: INTERNAL MEDICINE

## 2023-04-13 PROCEDURE — 1160F PR REVIEW ALL MEDS BY PRESCRIBER/CLIN PHARMACIST DOCUMENTED: ICD-10-PCS | Mod: CPTII,S$GLB,, | Performed by: INTERNAL MEDICINE

## 2023-04-13 PROCEDURE — 1159F PR MEDICATION LIST DOCUMENTED IN MEDICAL RECORD: ICD-10-PCS | Mod: CPTII,S$GLB,, | Performed by: INTERNAL MEDICINE

## 2023-04-13 PROCEDURE — 99999 PR PBB SHADOW E&M-EST. PATIENT-LVL IV: CPT | Mod: PBBFAC,,, | Performed by: INTERNAL MEDICINE

## 2023-04-13 PROCEDURE — 93750 INTERROGATION VAD IN PERSON: CPT | Mod: S$GLB,,, | Performed by: INTERNAL MEDICINE

## 2023-04-13 PROCEDURE — 1159F MED LIST DOCD IN RCRD: CPT | Mod: CPTII,S$GLB,, | Performed by: INTERNAL MEDICINE

## 2023-04-13 PROCEDURE — 80053 COMPREHEN METABOLIC PANEL: CPT | Performed by: INTERNAL MEDICINE

## 2023-04-13 PROCEDURE — 82248 BILIRUBIN DIRECT: CPT | Performed by: INTERNAL MEDICINE

## 2023-04-13 PROCEDURE — 85610 PROTHROMBIN TIME: CPT | Performed by: INTERNAL MEDICINE

## 2023-04-13 PROCEDURE — 83735 ASSAY OF MAGNESIUM: CPT | Performed by: INTERNAL MEDICINE

## 2023-04-13 PROCEDURE — 93750 OP LVAD INTERROGATION: ICD-10-PCS | Mod: S$GLB,,, | Performed by: INTERNAL MEDICINE

## 2023-04-13 PROCEDURE — 99214 OFFICE O/P EST MOD 30 MIN: CPT | Mod: S$GLB,,, | Performed by: INTERNAL MEDICINE

## 2023-04-13 PROCEDURE — 93750 PR INTERROGATE VENT ASSIST DEV, IN PERSON, W PHYSICIAN ANALYSIS: ICD-10-PCS | Mod: S$GLB,,, | Performed by: INTERNAL MEDICINE

## 2023-04-13 PROCEDURE — 86140 C-REACTIVE PROTEIN: CPT | Performed by: INTERNAL MEDICINE

## 2023-04-13 PROCEDURE — 85025 COMPLETE CBC W/AUTO DIFF WBC: CPT | Performed by: INTERNAL MEDICINE

## 2023-04-13 PROCEDURE — 84100 ASSAY OF PHOSPHORUS: CPT | Performed by: INTERNAL MEDICINE

## 2023-04-13 PROCEDURE — 83615 LACTATE (LD) (LDH) ENZYME: CPT | Performed by: INTERNAL MEDICINE

## 2023-04-13 PROCEDURE — 3008F BODY MASS INDEX DOCD: CPT | Mod: CPTII,S$GLB,, | Performed by: INTERNAL MEDICINE

## 2023-04-13 PROCEDURE — 1160F RVW MEDS BY RX/DR IN RCRD: CPT | Mod: CPTII,S$GLB,, | Performed by: INTERNAL MEDICINE

## 2023-04-13 PROCEDURE — 84134 ASSAY OF PREALBUMIN: CPT | Performed by: INTERNAL MEDICINE

## 2023-04-13 PROCEDURE — 83880 ASSAY OF NATRIURETIC PEPTIDE: CPT | Performed by: INTERNAL MEDICINE

## 2023-04-13 PROCEDURE — 99214 PR OFFICE/OUTPT VISIT, EST, LEVL IV, 30-39 MIN: ICD-10-PCS | Mod: S$GLB,,, | Performed by: INTERNAL MEDICINE

## 2023-04-13 PROCEDURE — 36415 COLL VENOUS BLD VENIPUNCTURE: CPT | Performed by: INTERNAL MEDICINE

## 2023-04-13 RX ORDER — FUROSEMIDE 40 MG/1
40 TABLET ORAL DAILY
Qty: 30 TABLET | Refills: 5 | Status: ON HOLD
Start: 2023-04-13 | End: 2023-09-08 | Stop reason: SDUPTHER

## 2023-04-13 RX ORDER — POTASSIUM CHLORIDE 20 MEQ/1
TABLET, EXTENDED RELEASE ORAL
Qty: 30 TABLET | Refills: 5 | Status: SHIPPED | OUTPATIENT
Start: 2023-04-13 | End: 2023-12-25 | Stop reason: SDUPTHER

## 2023-04-13 NOTE — PROGRESS NOTES
Patient was seen in clinic today. Patient was issued 4 new batteries.      Equipment Taken  (Batteries):    Serial Number           Expiration Date  1. [id number] 10/2023   3. [id number] 10/2023   6.[id number] 2/2023   8.[id number] 2/2023       Equipment Issued  (Batteries):    Serial Number           Expiration Date  1.[id number] 2/2026   3.[id number] 2/2026   6.[id number] 2/2026   8.[id number] 2/2026

## 2023-04-13 NOTE — PATIENT INSTRUCTIONS
YOUR LABS HAVE NOT RETURNED AND CANNOT BE LOCATED PLUS LAB SAYS UNABLE TO FIND AND CANNOT RUN ANYMORE LABS TODAY.  I WANT YOU TO START THE FOLLOWING AND YOU WILL NEED TO GET LABS AT [facility] EARLY Friday AND TRACK RESULT WITH COORDINATOR    Take lasix 40 mg daily for 5 days and then Mon and Thurs only and use daily as needed  Take potassium 20 meq with every 40 mg dose of lasix  BP check here next week with weight and labs  Check weight daily and home first thing in the morning before dressed and after passing urine.  Keep written log to bring to every clinic visit

## 2023-04-13 NOTE — PROGRESS NOTES
"Subjective:   Patient ID:  Tim Richards is a 57 y.o. male who presents for LVAD followup visit.    Implant Date: Heartmate II on 3/8/2018 (outflow graft changed 3/9/2018), exchanged to Heartmate 3 on 7/13/2022  Initials:RBB     Heartmate 3 RPM 6400     INR goal: 2-3 (INR meter)  NO Bridge with heparin  Antiplatelets: ASA 81mg     GIB: 7/16/19 (10 units of blood)  Integrelin/TPA:  Stroke:     DLES:"2" retained suture   ABX: right groin E. Coli: *needs lifelong antibx per Dr. Kaufman.   Dr. Perez 1/6/23  -Start voriconazole 200 mg BId; anticipate prolonged course of therapy given new graft placement    TXP SACHI INTERROGATIONS 4/13/2023   Type HeartMate3   Flow 5.4   Speed 6400   PI 2.2   Power (Jackson) 5.6   LSL 6000   Pulsatility No Pulse   Interrogation of Ventricular assist device was performed with physician analysis of device parameters and review of device function. I have personally reviewed the interrogation findings and agree with findings as stated.     HPI  58 yo BM with stage D HFrEF who was previously supported with a HM2 (implanted 3/8/2018 as DT-VAD) but required pump exchange (HM3 pump exchange 7/13/2022) for thrombosis of pump post COVID-19 PNA and AHRF.  Prior to pump exchange had ADHF and with diuresis and in the face of hypokalemia had cardiac arrest.  He has long hx of VT with recurrent shocks and is on chronic amiodarone and mexiletine.    His post-op course following pump exchange was prolonged and complicated by worsening cardiogenic shock/RV failure requiring VA-ECMO.  He also had recurrent VT as well as atrial flutter with RVR.    During this admission he also was tapered of steroids (on for amiodarone hyperthyroid) due to concern for avascular necrosis of hip.     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 He was on ertapenem and voriconazole for this. He then saw ID on 1/6/22 and they stopped the ertapenem and DC PICC line. He continues on voricaonazole, with duration being 6 months minimum to potentially lifelong (Dr. Kaufman's preference per snapshot).    He was seen by Dr. Cortney Santizo who noted hosp stay with lasix requirements and reportedly on lasix 40 mg qd.  Today learned that he has not been taking, gained 7#, off diet with holiday--crawfish and salt.      He is still having balance problems--dates to early post-op--and says he had significant improvement in strength with rehab though still significant issue.    Memory loss but says since reop and CT head last week no acute change    Review of Systems   Constitutional: Positive for malaise/fatigue and weight gain. Negative for chills and fever.   Cardiovascular:  Positive for dyspnea on exertion and leg swelling (he has on exam but he did not notice this). Negative for chest pain, claudication, irregular heartbeat, near-syncope, orthopnea, palpitations, paroxysmal nocturnal dyspnea and syncope.   Respiratory:  Negative for cough, shortness of breath, sputum production and wheezing.    Hematologic/Lymphatic: Negative for bleeding problem. Does not bruise/bleed easily.   Musculoskeletal:  Negative for falls.   Gastrointestinal:  Negative for change in bowel habit, hematemesis, hematochezia and melena.   Genitourinary:  Negative for hematuria.   Neurological:  Positive for dizziness, light-headedness and loss of balance. Negative for brief paralysis, focal weakness, headaches and weakness.   Psychiatric/Behavioral:  Positive for memory loss.         Sleeping 8 hrs/night     Objective:   Blood pressure (!) 98/0, pulse 80, temperature 97.9 °F (36.6 °C), temperature source Oral, height 6' 1" " "(1.854 m), weight 98.9 kg (218 lb).body mass index is 28.76 kg/m².  /93 (98)  Doppler: 98    Physical Exam  Constitutional:       General: He is not in acute distress.     Appearance: He is well-developed. He is not ill-appearing, toxic-appearing or diaphoretic.      Comments: Blood pressure (!) 98/0, pulse 80, temperature 97.9 °F (36.6 °C), temperature source Oral, height 6' 1" (1.854 m), weight 98.9 kg (218 lb).body mass index is 28.76 kg/m².  /93 (98)  Friendly BM in NAD   HENT:      Head: Normocephalic and atraumatic.   Eyes:      General: No scleral icterus.        Right eye: No discharge.         Left eye: No discharge.      Conjunctiva/sclera: Conjunctivae normal.   Neck:      Thyroid: No thyromegaly.      Vascular: JVD (V wave to 18 cm with external jugular to 12-14 cm) present.      Trachea: No tracheal deviation.      Comments: V wave to 18 cm with external jugular to 12-14 cm  Cardiovascular:      Comments: Normal LVAD sounds; DL 1  Pulmonary:      Effort: Pulmonary effort is normal. No respiratory distress.      Breath sounds: Normal breath sounds. No wheezing or rales.   Abdominal:      General: Bowel sounds are normal. There is no distension.      Palpations: Abdomen is soft. There is no mass.      Tenderness: There is no abdominal tenderness. There is no guarding or rebound.   Musculoskeletal:         General: No tenderness.      Right lower leg: Edema (0.5-1+) present.      Left lower leg: Edema (0.5-1+) present.   Skin:     General: Skin is warm and dry.   Neurological:      General: No focal deficit present.      Mental Status: He is alert. Mental status is at baseline.      Gait: Gait normal.      Comments: He feels off balance but Rhomberg negative and gait OK   Psychiatric:         Mood and Affect: Mood normal.         Behavior: Behavior normal.         Thought Content: Thought content normal.         Judgment: Judgment normal.     LABS HAVE NOT RETURNED AND CANNOT BE LOCATED PLUS " LAB SAYS UNABLE TO FIND AND CANNOT RUN ANYMORE LABS TODAY.    Lab Results   Component Value Date     (H) 04/13/2023     04/07/2023    K 3.8 04/07/2023    MG 2.1 04/07/2023     04/07/2023    CO2 19 (L) 04/07/2023    PHOS 2.5 (L) 04/13/2023    BUN 17 04/07/2023    CREATININE 1.8 (H) 04/07/2023     04/07/2023    HGBA1C 5.4 04/26/2021    AST 32 04/07/2023    ALT 18 04/07/2023    ALBUMIN 3.3 (L) 04/07/2023    PROT 7.3 04/07/2023    BILITOT 0.2 04/07/2023    WBC 2.93 (L) 04/13/2023    HGB 11.1 (L) 04/13/2023    HCT 38.0 (L) 04/13/2023    HCT 31 (L) 09/27/2022     04/13/2023    INR 2.3 (H) 04/13/2023    INR 2.5 12/08/2022    INR 3.1 (H) 09/24/2022     04/07/2023    TSH 11.914 (H) 02/01/2023    J5QDSFX 8.4 08/14/2022    FREET4 1.42 02/01/2023    CHOL 174 02/23/2023    HDL 77 (H) 02/23/2023    LDLCALC 81.0 02/23/2023    TRIG 80 02/23/2023         Assessment:      1. LVAD (left ventricular assist device) present    2. Chronic combined systolic and diastolic congestive heart failure    3. Renal insufficiency    4. Poor balance    5. 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 Anticoagulation monitoring, INR range 2-3    8. Overweight with body mass index (BMI) of 28 to 28.9 in adult        Plan:   Balance problem hopefully related to debility and will improve with therapy.  Amiodarone can caused poor balance though on exam today nothing to suggests cerebellar component.  He is known to EP and will also have him see EP for f/u if this persists they can render opinion and if needed alter therapy for his VT.    PT/OT ordered for outpatient center    Script for shower chair given; he has rails on porch and in BR since d/c from rehab    YOUR LABS HAVE NOT RETURNED AND CANNOT BE LOCATED PLUS LAB SAYS UNABLE TO FIND AND CANNOT RUN ANYMORE LABS TODAY.  I WANT YOU TO START THE FOLLOWING AND YOU WILL NEED TO GET LABS AT [facility] EARLY Friday AND TRACK RESULT WITH  COORDINATOR    For volume overload and elevated BP--Cr up more few days ago but overloaded on exam    Take lasix 40 mg daily for 5 days and then Mon and Thurs only and use daily as needed    Take potassium 20 meq with every 40 mg dose of lasix    BP check here next week with weight and labs    Check weight daily and home first thing in the morning before dressed and after passing urine.  Keep written log to bring to every clinic visit    In the past intolerant to  BP meds due to dizzy/LH and at last clinic visit when not volume overloaded BP 80 so hope BP will be controlled with volume removal which will start tonight even without lab results and he is getting lab in the AM    Supplies ordered are medically necessary for care of LVAD and DL    Post LVAD goals of care are to feel stronger, control HF, improve balance and avoid admission.    Patient is now NYHA III    Recommend 2 gram sodium restriction and 1500cc fluid restriction.  Encourage physical activity with graded exercise program.  Requested patient to weigh themselves daily, and to notify us if their weight increases by more than 3 lbs in 1 day or 5 lbs in 1 week.     Listed for transplant: No    UNOS Patient Status  Functional Status: 70% - Cares for self: unable to carry on normal activity or active work  Physical Capacity: Limited Mobility  Working for Income: No  If no, reason not working: Disability

## 2023-04-13 NOTE — PROCEDURES
TXP [facility] INTERROGATIONS 4/13/2023 2/23/2023 1/29/2023 1/29/2023 1/29/2023 1/29/2023 1/28/2023   Type HeartMate3 HeartMate3 - - - - -   Flow 5.4 5.6 - - - - -   Speed 6400 6400 - - - - -   PI 2.2 4.1 - - - - -   Power (Jackson) 5.6 5.7 - - - - -   LSL 6000 6000 - - - - -   Pulsatility No Pulse Intermittent pulse Pulse Pulse Pulse Intermittent pulse Intermittent pulse   }Interrogation of Ventricular assist device was performed with physician analysis of device parameters and review of device function. I have personally reviewed the interrogation findings and agree with findings as stated.

## 2023-04-13 NOTE — Clinical Note
"Please track his labs and show to me/call me on cell so I can be sure my "guess" for lasix and potassium was correct"

## 2023-04-13 NOTE — PROGRESS NOTES
Date of Implant with Heartmate 3 LVAD: 7/13/2022    PATIENT ARRIVED IN CLINIC:  Ambulatory   Accompanied by: self  Complaints/reason for visit today: routine    Vitals  Temperature, oral:   Temp Readings from Last 1 Encounters:   04/13/23 97.9 °F (36.6 °C) (Oral)     Blood Pressure:   BP Readings from Last 3 Encounters:   04/13/23 (!) 98/0   04/07/23 123/72   02/23/23 (!) 80/0        VAD Interrogation:  TXP SACHI INTERROGATIONS 4/13/2023 2/23/2023 1/29/2023   Type HeartMate3 HeartMate3 -   Flow 5.4 5.6 -   Speed 6400 6400 -   PI 2.2 4.1 -   Power (Jackson) 5.6 5.7 -   LSL 6000 6000 -   Pulsatility No Pulse Intermittent pulse Pulse     HCT:   Lab Results   Component Value Date    HCT 38.0 (L) 04/13/2023    HCT 31 (L) 09/27/2022       Problems / Issues / Alarms with VAD if any: None noted  VAD Sounds: HM3 Smooth      Equipment:  Emergency Equipment With Patient: yes   Any Equipment Issues: None noted   It is medically necessary to ensure patient has properly functioning equipment and wearables to prevent infection, injury or death to patient.     DLES Assessment:  Appearance Of Driveline: 1  Antibiotics: NO  Velour: no  Manual & Visual Inspection Of Driveline: No kinks or tears noted  Stabilization Device In Use: yes, sun securement device    Heartmate 3 Module Cable:  No yellow exposed and Attempted to unscrew modular cable to ensure it will be able to come lose in the event we ever need to change the modular cable while patient held the driveline in place so it would not move. Modular cable connection able to be unscrewed and re-tightened. Instructed pt to perform this weekly.      Assessment:   PAIN: NO  Complaints Of Nausea / Vomiting: None noted    Appearance and Frequency Of Stools: normal and formed without blood & daily  Color Of Urine: clear/yellow  Coping/Depression/Anxiety: coping okay  Sleep Habits: 6 hrs /night  Sleep Aids:  mirtazipine  Showering: No  Activity/Exercise: walking   Driving: Yes. Reminded to  pull over should there be an alarm before looking down at controller.    DLES Dressing Care:   Frequency of Dressing Changes: every other day & daily kit  Pt In Need Of Management Kits?:no   It is medically necessary to have VAD management kits in order to prevent infection or to assist in the healing of an infected DLES.    Labs:    Chemistry        Component Value Date/Time     04/07/2023 0313    K 3.8 04/07/2023 0313     04/07/2023 0313    CO2 19 (L) 04/07/2023 0313    BUN 17 04/07/2023 0313    CREATININE 1.8 (H) 04/07/2023 0313     04/07/2023 0313        Component Value Date/Time    CALCIUM 8.4 (L) 04/07/2023 0313    ALKPHOS 99 04/07/2023 0313    AST 32 04/07/2023 0313    ALT 18 04/07/2023 0313    BILITOT 0.2 04/07/2023 0313    ESTGFRAFRICA 37.6 (A) 07/31/2022 2027    EGFRNONAA 32.5 (A) 07/31/2022 2027            Magnesium   Date Value Ref Range Status   04/07/2023 2.1 1.6 - 2.6 mg/dL Final       Lab Results   Component Value Date    WBC 2.93 (L) 04/13/2023    HGB 11.1 (L) 04/13/2023    HCT 38.0 (L) 04/13/2023    MCV 91 04/13/2023     04/13/2023       Lab Results   Component Value Date    INR 2.3 (H) 04/13/2023    INR 2.3 (H) 04/10/2023    INR 2.4 (H) 04/07/2023       BNP   Date Value Ref Range Status   04/13/2023 314 (H) 0 - 99 pg/mL Final     Comment:     Values of less than 100 pg/ml are consistent with non-CHF populations.   04/07/2023 256 (H) 0 - 99 pg/mL Final     Comment:     Values of less than 100 pg/ml are consistent with non-CHF populations.   02/23/2023 697 (H) 0 - 99 pg/mL Final     Comment:     Values of less than 100 pg/ml are consistent with non-CHF populations.       LD   Date Value Ref Range Status   04/07/2023 259 110 - 260 U/L Final     Comment:     Results are increased in hemolyzed samples.  *Result may be interfered by visible hemolysis     02/23/2023 294 (H) 110 - 260 U/L Final     Comment:     Results are increased in hemolyzed samples.   01/29/2023 203 110 -  260 U/L Final     Comment:     Results are increased in hemolyzed samples.       Labs reviewed with patient: YES     Medication Reconciliation: per MA.  New Medication Detail Provided: yes  Coumadin Managed by: [facility]    Education: Reviewed driveline care, emergency procedures, how to change the controller, alarms with patient, as well as discussed how to page the VAD coordinator in case of an emergency.   Educated patient/family that chest compressions are allowed in the event they are needed.    Reminded patient/caregiver not to touch their face and to cover their mouth when they cough or sneeze.   Vaccines: Pt informed that we are encouraging all VAD patients to receive the Flu and COVID vaccines and boosters. Informed pt that they can take tylenol but should avoid other NSAIDs.       Plans/Needs: Routine f/u w/ Dr Hadley. Patient reports feeling well. Reports eating crawfish several days this week. BNP and doppler elevated. Lab lost CMP, will need to have repeated. Return next week for doppler check. If still elevated will consider adding antihypertensive.    RTC 2 months       Hurricane Season: No

## 2023-04-13 NOTE — LETTER
April 13, 2023        Nader Chiu  [address]  [address]  [place] [address]  Phone: [phone]  Fax: [phone]     Nader Chiu  [address]  [address]  [place] [address]  Phone: [phone]  Fax: [phone]             [id number]  [address]  [place] [address]  Phone: [phone]   Patient: Tim Richards   MR Number: [id number]   YOB: 1965   Date of Visit: 4/13/2023       Dear Dr. Nader Chiu, Nader Chiu    Thank you for referring Tim Richards to me for evaluation. Attached you will find relevant portions of my assessment and plan of care.    If you have questions, please do not hesitate to call me. I look forward to following Tim Richards along with you.    Sincerely,    Antonio Hadley Jr, MD    Enclosure    If you would like to receive this communication electronically, please contact [email or web address] or [phone] to request [employer] access.    [employer] is a tool which provides read-only access to select patient information with whom you have a relationship. Its easy to use and provides real time access to review your patients record including encounter summaries, notes, results, and demographic information.    If you feel you have received this communication in error or would no longer like to receive these types of communications, please e-mail [email or web address]

## 2023-04-14 ENCOUNTER — DOCUMENTATION ONLY (OUTPATIENT)
Dept: TRANSPLANT | Facility: CLINIC | Age: 58
End: 2023-04-14
Payer: MEDICARE

## 2023-04-14 NOTE — PROGRESS NOTES
Patient's clinic labs reviewed with MD Hadley. Patient is to continue regimen given in clinic, until follow up labs next week. Labs set up, no changes made at this time.

## 2023-04-15 ENCOUNTER — DOCUMENT SCAN (OUTPATIENT)
Dept: HOME HEALTH SERVICES | Facility: HOSPITAL | Age: 58
End: 2023-04-15
Payer: MEDICARE

## 2023-04-18 ENCOUNTER — DOCUMENTATION ONLY (OUTPATIENT)
Dept: TRANSPLANT | Facility: CLINIC | Age: 58
End: 2023-04-18
Payer: MEDICARE

## 2023-04-18 NOTE — NURSING
Patient no showed for lab appointment scheduled for 4/14/23  VAD will be made aware of patient missed appoinment

## 2023-04-18 NOTE — PROGRESS NOTES
04/18/2023-Spoke to Kelly regarding patient's missed INR on 4/17/23. Kelly stated patient was advised per Dr. Hadley to have INR on 4/19/23. INR scheduled at [facility] on 4/19/23.

## 2023-04-19 ENCOUNTER — CLINICAL SUPPORT (OUTPATIENT)
Dept: TRANSPLANT | Facility: CLINIC | Age: 58
End: 2023-04-19
Payer: MEDICARE

## 2023-04-19 ENCOUNTER — ANTI-COAG VISIT (OUTPATIENT)
Dept: CARDIOLOGY | Facility: CLINIC | Age: 58
End: 2023-04-19
Payer: MEDICARE

## 2023-04-19 ENCOUNTER — LAB VISIT (OUTPATIENT)
Dept: LAB | Facility: HOSPITAL | Age: 58
End: 2023-04-19
Payer: MEDICARE

## 2023-04-19 DIAGNOSIS — Z79.01 LONG TERM (CURRENT) USE OF ANTICOAGULANTS: ICD-10-CM

## 2023-04-19 DIAGNOSIS — Z95.811 LVAD (LEFT VENTRICULAR ASSIST DEVICE) PRESENT: ICD-10-CM

## 2023-04-19 LAB
INR PPP: 2.3 (ref 0.8–1.2)
PROTHROMBIN TIME: 22.9 SEC (ref 9–12.5)

## 2023-04-19 PROCEDURE — 93793 PR ANTICOAGULANT MGMT FOR PT TAKING WARFARIN: ICD-10-PCS | Mod: S$GLB,,,

## 2023-04-19 PROCEDURE — 36415 COLL VENOUS BLD VENIPUNCTURE: CPT | Performed by: INTERNAL MEDICINE

## 2023-04-19 PROCEDURE — 85610 PROTHROMBIN TIME: CPT | Performed by: INTERNAL MEDICINE

## 2023-04-19 PROCEDURE — 93793 ANTICOAG MGMT PT WARFARIN: CPT | Mod: S$GLB,,,

## 2023-04-19 RX ORDER — AMLODIPINE BESYLATE 10 MG/1
10 TABLET ORAL DAILY
Qty: 30 TABLET | Refills: 11 | Status: SHIPPED | OUTPATIENT
Start: 2023-04-19 | End: 2024-03-26 | Stop reason: SDUPTHER

## 2023-04-19 NOTE — PROGRESS NOTES
Pt seen today for BP check. Doppler is 100, non-pulsatile. Reviewed with Dr. Rodriguez, start amlodipine 10mg once daily.

## 2023-04-25 NOTE — SUBJECTIVE & OBJECTIVE
"  Family History       Problem Relation (Age of Onset)    Cancer Sister (54)    Coronary artery disease Father    Diabetes Father    Heart disease Father    Hypertension Father    No Known Problems Mother, Brother          Tobacco Use    Smoking status: Former Smoker     Packs/day: 1.00     Years: 31.00     Pack years: 31.00     Types: Cigarettes     Quit date: 2018     Years since quittin.5    Smokeless tobacco: Never Used    Tobacco comment: pt is quiting on his own - pt stated not qualified for program;  pt  quit on his own   Substance and Sexual Activity    Alcohol use: No     Alcohol/week: 0.0 standard drinks     Comment: quit    Drug use: No    Sexual activity: Yes     Partners: Female     Birth control/protection: None     Comment: 10/5/17  with same partner 7 years      Psychotherapeutics (From admission, onward)                Start     Stop Route Frequency Ordered    22 1500  busPIRone tablet 5 mg         -- PER NG TUBE 3 times daily 22 1224    22 1323  ALPRAZolam tablet 1 mg         -- PER NG TUBE Daily PRN 22 1224    22 2100  mirtazapine tablet 15 mg         -- Oral Nightly 22 1121          Objective:     Vital Signs (Most Recent):  Temp: 97.7 °F (36.5 °C) (22 1500)  Pulse: 76 (22 1537)  Resp: (!) 30 (22 1537)  BP: (!) 76/0 (22 1500)  SpO2: 96 % (22 1500)   Vital Signs (24h Range):  Temp:  [97.5 °F (36.4 °C)-98.1 °F (36.7 °C)] 97.7 °F (36.5 °C)  Pulse:  [48-85] 76  Resp:  [10-44] 30  SpO2:  [94 %-100 %] 96 %  BP: (66-82)/(0) 76/0  Arterial Line BP: ()/() 76/65     Height: 6' 1" (185.4 cm)  Weight: 84.4 kg (186 lb)  Body mass index is 24.54 kg/m².      Intake/Output Summary (Last 24 hours) at 2022 1551  Last data filed at 2022 1500  Gross per 24 hour   Intake 2469.93 ml   Output 1625 ml   Net 844.93 ml     Significant Labs: All pertinent labs within the past 24 hours have been " "reviewed.    Significant Imaging: I have reviewed all pertinent imaging results/findings within the past 24 hours.    Mental Status Exam:  Appearance: groomed, in hospital gown, lying in bed  Behavior/Cooperation: cooperative, fair eye contact  Speech: normal rate, rhythm, prasody  Mood: "fine"  Affect: full, mood congruent, reactive  Thought Process: linear and goal oriented  Thought Content: denies SI, HI, AVH  Orientation: A&Ox4  Memory: recent and remote intact  Attention Span/Concentration: intact  Insight: fair  Judgment: fair   " Detail Level: Zone Discontinue Regimen: Clinically clear and healing. Render In Strict Bullet Format?: No

## 2023-04-26 ENCOUNTER — LAB VISIT (OUTPATIENT)
Dept: LAB | Facility: HOSPITAL | Age: 58
End: 2023-04-26
Attending: INTERNAL MEDICINE
Payer: MEDICARE

## 2023-04-26 ENCOUNTER — ANTI-COAG VISIT (OUTPATIENT)
Dept: CARDIOLOGY | Facility: CLINIC | Age: 58
End: 2023-04-26
Payer: MEDICARE

## 2023-04-26 DIAGNOSIS — Z79.01 LONG TERM (CURRENT) USE OF ANTICOAGULANTS: ICD-10-CM

## 2023-04-26 DIAGNOSIS — Z95.811 LVAD (LEFT VENTRICULAR ASSIST DEVICE) PRESENT: Chronic | ICD-10-CM

## 2023-04-26 LAB
ALBUMIN SERPL BCP-MCNC: 3.9 G/DL (ref 3.5–5.2)
ALP SERPL-CCNC: 112 U/L (ref 55–135)
ALT SERPL W/O P-5'-P-CCNC: 18 U/L (ref 10–44)
ANION GAP SERPL CALC-SCNC: 10 MMOL/L (ref 8–16)
AST SERPL-CCNC: 30 U/L (ref 10–40)
BILIRUB SERPL-MCNC: 0.3 MG/DL (ref 0.1–1)
BNP SERPL-MCNC: 148 PG/ML (ref 0–99)
BUN SERPL-MCNC: 17 MG/DL (ref 6–20)
CALCIUM SERPL-MCNC: 9.4 MG/DL (ref 8.7–10.5)
CHLORIDE SERPL-SCNC: 105 MMOL/L (ref 95–110)
CO2 SERPL-SCNC: 24 MMOL/L (ref 23–29)
CREAT SERPL-MCNC: 2 MG/DL (ref 0.5–1.4)
EST. GFR  (NO RACE VARIABLE): 38 ML/MIN/1.73 M^2
GLUCOSE SERPL-MCNC: 77 MG/DL (ref 70–110)
INR PPP: 2.2 (ref 0.8–1.2)
POTASSIUM SERPL-SCNC: 5 MMOL/L (ref 3.5–5.1)
PROT SERPL-MCNC: 8.6 G/DL (ref 6–8.4)
PROTHROMBIN TIME: 22.5 SEC (ref 9–12.5)
SODIUM SERPL-SCNC: 139 MMOL/L (ref 136–145)

## 2023-04-26 PROCEDURE — 93793 ANTICOAG MGMT PT WARFARIN: CPT | Mod: S$GLB,,,

## 2023-04-26 PROCEDURE — 85610 PROTHROMBIN TIME: CPT | Performed by: INTERNAL MEDICINE

## 2023-04-26 PROCEDURE — 80053 COMPREHEN METABOLIC PANEL: CPT | Performed by: INTERNAL MEDICINE

## 2023-04-26 PROCEDURE — 83880 ASSAY OF NATRIURETIC PEPTIDE: CPT | Performed by: INTERNAL MEDICINE

## 2023-04-26 PROCEDURE — 93793 PR ANTICOAGULANT MGMT FOR PT TAKING WARFARIN: ICD-10-PCS | Mod: S$GLB,,,

## 2023-04-27 ENCOUNTER — TELEPHONE (OUTPATIENT)
Dept: TRANSPLANT | Facility: CLINIC | Age: 58
End: 2023-04-27
Payer: MEDICARE

## 2023-04-27 NOTE — TELEPHONE ENCOUNTER
Calling patient regarding f/u labs after starting amlodipine last week and rising Cr. Cr remains elevated at 2.0,  lower than patient has been.   Pt states he was out in the sun recently watching his [employer] games and hasn't been drinking very much water. No PRN lasix in the past 10 days. Instructed to increase fluid intake and will repeat labs next week. No further needs at this time.

## 2023-05-03 ENCOUNTER — ANTI-COAG VISIT (OUTPATIENT)
Dept: CARDIOLOGY | Facility: CLINIC | Age: 58
End: 2023-05-03
Payer: MEDICARE

## 2023-05-03 ENCOUNTER — CLINICAL SUPPORT (OUTPATIENT)
Dept: REHABILITATION | Facility: HOSPITAL | Age: 58
End: 2023-05-03
Attending: INTERNAL MEDICINE
Payer: MEDICARE

## 2023-05-03 ENCOUNTER — LAB VISIT (OUTPATIENT)
Dept: LAB | Facility: HOSPITAL | Age: 58
End: 2023-05-03
Attending: INTERNAL MEDICINE
Payer: MEDICARE

## 2023-05-03 DIAGNOSIS — Z95.811 LVAD (LEFT VENTRICULAR ASSIST DEVICE) PRESENT: Chronic | ICD-10-CM

## 2023-05-03 DIAGNOSIS — R26.89 POOR BALANCE: ICD-10-CM

## 2023-05-03 DIAGNOSIS — R53.1 DECREASED STRENGTH: ICD-10-CM

## 2023-05-03 DIAGNOSIS — R68.89 DECREASED FUNCTIONAL ACTIVITY TOLERANCE: ICD-10-CM

## 2023-05-03 DIAGNOSIS — Z79.01 LONG TERM (CURRENT) USE OF ANTICOAGULANTS: ICD-10-CM

## 2023-05-03 DIAGNOSIS — R53.81 PHYSICAL DEBILITY: ICD-10-CM

## 2023-05-03 LAB
ALBUMIN SERPL BCP-MCNC: 3.9 G/DL (ref 3.5–5.2)
ALP SERPL-CCNC: 108 U/L (ref 55–135)
ALT SERPL W/O P-5'-P-CCNC: 17 U/L (ref 10–44)
ANION GAP SERPL CALC-SCNC: 11 MMOL/L (ref 8–16)
AST SERPL-CCNC: 25 U/L (ref 10–40)
BILIRUB SERPL-MCNC: 0.5 MG/DL (ref 0.1–1)
BNP SERPL-MCNC: 180 PG/ML (ref 0–99)
BUN SERPL-MCNC: 18 MG/DL (ref 6–20)
CALCIUM SERPL-MCNC: 9.3 MG/DL (ref 8.7–10.5)
CHLORIDE SERPL-SCNC: 100 MMOL/L (ref 95–110)
CO2 SERPL-SCNC: 25 MMOL/L (ref 23–29)
CREAT SERPL-MCNC: 2.2 MG/DL (ref 0.5–1.4)
EST. GFR  (NO RACE VARIABLE): 34 ML/MIN/1.73 M^2
GLUCOSE SERPL-MCNC: 91 MG/DL (ref 70–110)
INR PPP: 2 (ref 0.8–1.2)
POTASSIUM SERPL-SCNC: 3.9 MMOL/L (ref 3.5–5.1)
PROT SERPL-MCNC: 8.6 G/DL (ref 6–8.4)
PROTHROMBIN TIME: 20.5 SEC (ref 9–12.5)
SODIUM SERPL-SCNC: 136 MMOL/L (ref 136–145)

## 2023-05-03 PROCEDURE — 97161 PT EVAL LOW COMPLEX 20 MIN: CPT | Mod: PN | Performed by: PHYSICAL THERAPIST

## 2023-05-03 PROCEDURE — 93793 PR ANTICOAGULANT MGMT FOR PT TAKING WARFARIN: ICD-10-PCS | Mod: S$GLB,,,

## 2023-05-03 PROCEDURE — 97110 THERAPEUTIC EXERCISES: CPT | Mod: PN | Performed by: PHYSICAL THERAPIST

## 2023-05-03 PROCEDURE — 85610 PROTHROMBIN TIME: CPT | Performed by: INTERNAL MEDICINE

## 2023-05-03 PROCEDURE — 93793 ANTICOAG MGMT PT WARFARIN: CPT | Mod: S$GLB,,,

## 2023-05-03 PROCEDURE — 80053 COMPREHEN METABOLIC PANEL: CPT | Performed by: INTERNAL MEDICINE

## 2023-05-03 PROCEDURE — 83880 ASSAY OF NATRIURETIC PEPTIDE: CPT | Performed by: INTERNAL MEDICINE

## 2023-05-03 NOTE — PROGRESS NOTES
See plan of care for physical therapy evaluation     Emma Curtis, PT, DPT,   Board-Certified Clinical Specialist in Neurologic Physical Therapy   Certified Brain Injury Specialist

## 2023-05-03 NOTE — PATIENT INSTRUCTIONS
Heel Raise: Bilateral (Standing)        Hold on for support. Rise on balls of feet.  Repeat 10 times per set. Do 2 sets per session. Do 1 sessions per day.  [email or web address]://[email or web address]/38   Copyright © [employer]. All rights reserved.   ABDUCTION: Standing (Active)        Stand, feet flat. Lift right leg out to side.   Complete 2 sets of 10 repetitions. Perform 1 sessions per day.  [email or web address]://[email or web address]   EXTENSION: Standing (Active)        Stand, both feet flat. Draw right leg behind body as far as possible.   Complete 2 sets of 10 repetitions. Perform 1 sessions per day.  Copyright © [employer]. All rights reserved.   FLEXION: Standing - Stable (Active)        Stand, both feet flat. Lift right knee toward ceiling.   Complete 2 sets of 20 repetitions. Perform 1 sessions per day.     [email or web address]://[email or web address]/28     Copyright © [employer]. All rights reserved.               .

## 2023-05-05 ENCOUNTER — TELEPHONE (OUTPATIENT)
Dept: TRANSPLANT | Facility: CLINIC | Age: 58
End: 2023-05-05
Payer: MEDICARE

## 2023-05-05 PROBLEM — R68.89 DECREASED FUNCTIONAL ACTIVITY TOLERANCE: Status: ACTIVE | Noted: 2023-05-05

## 2023-05-05 PROBLEM — R53.1 DECREASED STRENGTH: Status: ACTIVE | Noted: 2023-05-05

## 2023-05-05 NOTE — TELEPHONE ENCOUNTER
Cr has been steadily increasing since starting amlodipine. Repeat labs again this week. LM for patient to call back.

## 2023-05-05 NOTE — PLAN OF CARE
[facility]  Physical Therapy Neurological Rehabilitation Initial Evaluation    Name: Tim Richards  Clinic Number: [id number]    Therapy Diagnosis:   Encounter Diagnoses   Name Primary?    Physical debility     Poor balance     Decreased functional activity tolerance     Decreased strength      Physician: Antonio Hadley Jr*    Physician Orders: PT Eval and Treat   Medical Diagnosis from Referral:   R53.81 (ICD-10-CM) - Physical debility   R26.89 (ICD-10-CM) - Poor balance     Evaluation Date: 5/3/2023  Authorization Period Expiration: 12/31/23  Plan of Care Expiration: 6/30/2023  Visit # / Visits authorized: 1/ 1    PN due: 6/3/23    Time In: 1651  Time Out: 1731  Total Billable Time: 40 minutes    Precautions: Standard and LVAD    Subjective   Date of onset: 7/2022  History of current condition - Tim reports: I have a LVAD (first in 2018, 2nd 7/2022). Patient reports dizziness, falls, and fatigue since the second surgery (LVAD). Patient was hospitalized for 3 months after second surgery. Patient went to inpatient rehab for ~6 weeks after hospital stay. While in rehab patient underwent a right femoral bypass. PMHx: CHF, HTN controlled with meds, CHF, Afib, iliofemoral bypass (right) 9/2022, defibrillator     Medical History:   Past Medical History:   Diagnosis Date    A-fib     Anticoagulant long-term use     Atrial flutter 7/30/2022    CHF (congestive heart failure)     Class 1 obesity due to excess calories with serious comorbidity and body mass index (BMI) of 31.0 to 31.9 in adult     Class 1 obesity due to excess calories with serious comorbidity and body mass index (BMI) of 32.0 to 32.9 in adult     Dilated cardiomyopathy 1/10/2018    Disorder of kidney and ureter     CKD    Encounter for blood transfusion     Essential hypertension 8/28/2022    Gout     HTN (hypertension)     Hx of psychiatric care     ICD (implantable cardioverter-defibrillator)  infection 7/1/2020    Psychiatric problem     Thyroid disease     Ventricular tachycardia (paroxysmal)        Surgical History:   Tim Richards  has a past surgical history that includes Cardiac catheterization (Dec. 2012); Cardiac defibrillator placement (Left); Colonoscopy (N/A, 3/6/2018); Esophagogastroduodenoscopy (N/A, 7/17/2019); Colonoscopy (N/A, 7/17/2019); Esophagogastroduodenoscopy (N/A, 7/18/2019); Colonoscopy (N/A, 7/18/2019); Noninvasive cardiac electrophysiology study (N/A, 10/18/2019); Treatment of cardiac arrhythmia (10/18/2019); Revision of implantable cardioverter-defibrillator (ICD) electrode lead placement (N/A, 3/9/2020); replacement of implantable cardioverter-defibrillator (icd) generator (N/A, 3/9/2020); Replacement of pump (N/A, 7/13/2022); Aortic valvuloplasty (N/A, 7/13/2022); Lysis of adhesions (7/13/2022); Replacement of left ventricular assist device (LVAD) (7/13/2022); Sternal wound closure (N/A, 7/14/2022); Sternal wound closure (N/A, 7/15/2022); Application of wound vacuum-assisted closure device (N/A, 7/15/2022); Insertion of graft to pericardium (N/A, 7/15/2022); Irrigation of mediastinum (N/A, 7/15/2022); Foreign Body Removal (N/A, 7/22/2022); Tracheostomy (N/A, 8/4/2022); Creation of iliofemoral artery bypass (Right, 9/27/2022); Creation of muscle rotational flap (Right, 9/27/2022); and Application of wound vacuum-assisted closure device (Right, 9/27/2022).    Medications:   Tim has a current medication list which includes the following prescription(s): [START ON 6/15/2023] alprazolam, amiodarone, amlodipine, ferrous gluconate, furosemide, levothyroxine, magnesium oxide, mexiletine, mirtazapine, omega-3 acid ethyl esters, pantoprazole, potassium chloride sa, sodium chloride 0.9% SolP 100 mL with ertapenem 1 gram SolR 1 g, voriconazole, warfarin, and [DISCONTINUED] sildenafil.    Allergies:   Review of patient's allergies indicates:   Allergen Reactions    Lisinopril  Anaphylaxis    Hydralazine     Hydralazine analogues      Chronic constipation, impotence, dizziness        Imaging,   CT, head, 4/7/23:   COMPARISON:  Head CT 08/24/2022  FINDINGS:  There is no acute intracranial hemorrhage, hydrocephalus, midline shift or mass effect. Gray-white matter differentiation appears maintained. The basal cisterns are   patent. There is mucosal thickening of the right sphenoid sinus.  The mastoid air cells are clear.  The visualized bones of the calvarium demonstrate no   acute osseous abnormality.     Impression:  No CT evidence of acute intracranial abnormality. Clinical correlation and further evaluation as warranted.     Electronically signed by resident: Tony Martinez  Date:                                            04/07/2023  Time:                                           05:33     Electronically signed by: Nathalia Vaughn MD  Date:                                            04/07/2023    Prior Therapy: inpatient rehab ~ 6 weeks in 9/2022  Social History:  lives with their spouse  Falls: 3-4 falls in the last 6 months, went to hospital on 2 occasions   DME: Manual wheelchair, Straight cane, and Shower chair    Home Environment: 2 story home, bedroom upstairs   Exercise Routine / History:  using resistance bands- off and on, not consistent  Family Present at time of Eval: none    Occupation: disability for the last few years  Prior Level of Function: denies significant fatigue or shortness of breath  with walking. No significant balance concerns.   Current Level of Function: gets fatigued and shortness of breath easily (I.e. walking to mailbox). Cautious with stair negotiation. Balance is impaired. Feels as though he gets palpitations if laying completely supine. Reports diplopia.     Pain:  Current 5/10, worst 9/10, best 2/10   Location:  right groin, left abdomen  Description: Aching  Aggravating Factors: Standing and Laying  Easing Factors: change of position    Pts goals:  increase stamina and heal legs, more strength    Objective       - Follows commands: yes   - Speech: no deficits     Mental status: alert, oriented to person, place, and time  Appearance: Casually dressed  Behavior:  calm and cooperative  Attention Span and Concentration:  Normal    Dominant hand:  right     Posture Alignment :rounded shoulders    Skin integrity:  Impaired: unhealed incision on left abdomen for LVAD. Covered with clean guaze     Sensation:  Light Touch: Intact           Proprioception:   Intact per observation    Tone: 0 - No increase in muscle tone  Limbs/muscles affected: not applicable     Cranial Nerve Assessment:   CN II: Visual fields full             Right visual field deficit: not applicable             Left visual field deficit: not applicable   CN III, IV, VI: Pupils are equal, round, and reactive to light. Left sluggish with decreased response                        Extraocular motions are normal.                         Right pupil: Shape: regular. Accommodation: intact.                         Left pupil: Shape: regular.  Accommodation: intact- decreased             Nystagmus: none noted              Nystagmus type: none noted  CN V: Facial sensation intact bilaterally   CN VII: Facial expression full, strong, symmetric.   CN VIII: hearing: intact   CN IX, X: palate: not tested   CN XI: Right sternocleidomastoid strength: not tested               Left sternocleidomastoid strength: not tested               Right trapezius strength: not tested               Left trapezius strength: not tested   CN XII: Tongue deviation: not tested     Visual/Auditory: reports some double vision present after hospital stay  Convergence point: abnormal (>5cm)  Tracking:Intact  Saccades: Intact  Acuity:not tested, wears glasses    Coordination:   - fine motor: within functional limits   - UE coordination: finger to nose: not tested not tested                        Pronation/ supination: not tested   - LE  coordination:  alternating toe taps: within functional limits                                 Heel to shin: not tested     ROM:   UPPER EXTREMITY--AROM/PROM  (R) UE: WFLs  (L) UE: WFLs           RANGE OF MOTION--LOWER EXTREMITIES  (R) LE Hip: equal bilaterally   Knee: equal bilaterally   Ankle: equal bilaterally    (L) LE: Hip: equal bilaterally   Knee: equal bilaterally   Ankle: equal bilaterally    Strength: manual muscle test grades below   Upper Extremity Strength   RUE LUE   Shoulder Flexion: 4/5 4+/5   Shoulder Abduction: 5/5 5/5   Shoulder Extension: 5/5 5/5   Shoulder External  Rotation: 4-/5 4-/5   Shoulder Internal  Rotation: 4/5 4/5   Elbow Flexion: 4+/5 5/5   Elbow Extension: 4/5 4+/5   Wrist Flexion: Not tested  Not tested    Wrist Extension: Not tested  Not tested    : Not tested  Not tested      Lower Extremity Strength   RLE LLE   Hip Flexion: 4/5 4/5   Hip Extension:  4/5 4/5   Hip Abduction: 5/5 5/5   Hip Adduction: Not tested  Not tested    Knee Extension: 5/5 5/5   Knee Flexion: 4/5 4/5   Ankle Dorsiflexion: 4/5 4/5   Ankle Plantarflexion: 3+/5 3+/5   Tested in seated   Abdominal Strength: not tested        Evaluation   Single Limb Stance R LE Not tested   (<10 sec = HIGH FALL RISK)   Single Limb Stance L LE Not tested   (<10 sec = HIGH FALL RISK)   5 times sit-stand 25 seconds, (6-7/10 dizziness)     Aldana/ FGA/ Tinetti Not tested      5 times sit<>stand Cutoff scores:  >12 sec= fall risk  PD: >16 seconds= fall risk  Vestibular/balance: 15 seconds over 65 years  CVA: 12 seconds    Postural control:  MCTSIB:  1. Eyes Open/feet together/Firm: 30 seconds, minimal sway  2. Eyes Closed/feet together/Firm: 30 seconds, minimal-moderate sway  3. Eyes Open/feet together/Foam: 30 seconds, minimal sway  4. Eyes Closed/feet together/Foam: 15 seconds, eyes open     Gait Assessment:   - AD used: none  - Assistance: I   - Distance: ~300 ft  - Curb: not tested   - Ramp:  not tested   - Stairs: Mod  I      GAIT DEVIATIONS:  Gait component performance:   Wide BILL        Impairments contributing to deviations: decreased strength and endurance    Endurance Deficit: fair for evaluation and HEP      Evaluation   Timed Up and Go Not tested    SSWS 0.71 m/s per 2 mins walk   2 min walk test 279 ft   Timed Up and Go fall risk:   Community dwelling older adults >13.5 sec   Chronic CVA >14 sec   Geriatric with h/o falls >15 sec   Frail elderly >32.6 sec    LE amputees >19 sec   PD >7.95- 11.5 sec   Hip OA >10 sec   Vestibular >11.1 sec      2 minute Walk Test:   Diagnosis Normal (ft)   MS Mild 352 ft-717 ft  Moderate 130 ft-561 ft   Geriatric  490 ft   Chronic  ft   SC I Para 330 ft  Tetra  378 ft   COPD 413 ft       Functional Mobility (Bed mobility, transfers)  Bed mobility: I  Supine to sit: I  Sit to supine: I  Rolling: I  Transfers to bed: I  Transfers to toilet: not tested   Sit to stand:  I  Stand pivot:  I  Car transfers: I  Wheelchair mobility: not applicable   Floor transfers: not tested        CMS Impairment/Limitation/Restriction for FOTO Balance Survey    Therapist reviewed FOTO scores for Tim Richards on 5/3/2023.   FOTO documents entered into [employer] - see Media section.    Limitation Score: 56%         TREATMENT   Treatment Time In: 1732  Treatment Time Out: 1745  Total Treatment time separate from Evaluation: 13 minutes    Tim received therapeutic exercises to develop strength and endurance for 13 minutes including:  Review of HEP  Parallel bar:   - heel raises 2 x 10  - hip abduction 2 x 10  - hip extension 2 x 10  - marching 2 x 20      Home Exercises and Patient Education Provided    Education provided:   - role of physical therapy/ plan of care  - review of home exercise program     Written Home Exercises Provided: yes.  Exercises were reviewed and Tim was able to demonstrate them prior to the end of the session.  Tim demonstrated good  understanding of the education provided.      See EMR under Patient Instructions for exercises provided 5/3/2023.    Assessment   iTm is a 57 y.o. male referred to outpatient Physical Therapy with a medical diagnosis of physical debility and poor balance. Patient reports had had surgery 7/2022 to replace his LVAD. Patient suffered from complications after this surgery leading to an extended hospital stay. Patient reports that he now has poor activity tolerance. He cannot walk to his mailbox without becoming short of breath. He has noticed a decline in standing balance mostly noted with fatigued.  Pt presents with decreased strength in shoulder rotation, bilateral hips, and bilateral ankles.  Decreased strength is also evident with 5 times sit<>stand testing with patient requiring increased time to complete and reports of dizziness. Decreased activity tolerance and gait noted via 2 minute walk test with patient ambulating a shorter distance than a peer with COPD. Impaired balance noted on condition 4 of MCTSIB indicating decreased use of postural reactions and vestibular system. Patient was instructed in a home exercise program to address lower extremity strength and endurance. Patient reported fatigue during performance taking a few short standing rest breaks. Patient can benefit from continued skilled physical therapy to improve activity tolerance for improved quality of life and health.     Pt prognosis is Good.   Pt will benefit from skilled outpatient Physical Therapy to address the deficits stated above and in the chart below, provide pt/family education, and to maximize pt's level of independence.     Plan of care discussed with patient: Yes  Pt's spiritual, cultural and educational needs considered and patient is agreeable to the plan of care and goals as stated below:     Anticipated Barriers for therapy: longstanding history of cardiac diagnoses    Medical Necessity is demonstrated by the following  History  Co-morbidities and personal factors  that may impact the plan of care Co-morbidities:    CHF, HTN, CHF, Afib, iliofemoral bypass, defibrillator    Personal Factors:   no deficits     high   Examination  Body Structures and Functions, activity limitations and participation restrictions that may impact the plan of care Body Regions:   lower extremities  upper extremities    Body Systems:    gross symmetry  ROM  strength  gross coordinated movement  balance  gait  transfers  transitions  motor control  skin integrity    Participation Restrictions:   Decreased tolerance to all daily mobility  Impaired balance  Requires skilled PT to issue and progress HEP as needed     Activity limitations:   Learning and applying knowledge  no deficits    General Tasks and Commands  no deficits    Communication  no deficits    Mobility  walking    Self care  no deficits    Domestic Life  shopping  doing house work (cleaning house, washing dishes, laundry)    Interactions/Relationships  no deficits    Life Areas  employment    Community and Social Life  community life  recreation and leisure         high   Clinical Presentation stable and uncomplicated low   Decision Making/ Complexity Score: low     Goals:  Short Term Goals: 5 weeks   Patient will report performance of home exercise program for strengthening 3x/ week to improve fitness and activity tolerance.   Patient will demonstrate improved confidence in mobility by stating he feel 70% confident in balance while walking across a parking lot.   Patient will tolerate 10 consecutive minutes on cardiovascular equipment at low resistance to demonstrate improved activity tolerance.   Patient will demonstrate a gross improvement in lower extremity strength by performing 5 times sit<>stand test in 20 sec.      Long Term Goals: 10 weeks   Patient will demonstrate improved confidence in mobility by stating he feel 100% confident in balance while walking across a parking lot.   Patient will tolerate 10 consecutive minutes on  cardiovascular equipment at moderate resistance to demonstrate improved activity tolerance.   Patient will demonstrate a gross improvement in lower extremity strength and decreased fall risk by performing 5 times sit<>stand test in 15 sec.    Patient will demonstrate a significant change in gait tolerance for walking to his mailbox by ambulating 413 ft in 2 minutes.      Plan   Plan of care Certification: 5/3/2023 to 6/30/2023.    Outpatient Physical Therapy 2 times weekly for 10 weeks to include the following interventions: Moist Heat/ Ice, Neuromuscular Re-ed, Patient Education, Therapeutic Activities, and Therapeutic Exercise.     Emma Curtis, PT, DPT,   Board-Certified Clinical Specialist in Neurologic Physical Therapy   Certified Brain Injury Specialist    5/3/2023

## 2023-05-08 ENCOUNTER — DOCUMENT SCAN (OUTPATIENT)
Dept: HOME HEALTH SERVICES | Facility: HOSPITAL | Age: 58
End: 2023-05-08
Payer: MEDICARE

## 2023-05-08 NOTE — PROGRESS NOTES
Physical Therapy Daily Treatment Note     Name: Tim Somers Wright  Clinic Number: [id number]    Therapy Diagnosis:   Encounter Diagnoses   Name Primary?    Decreased functional activity tolerance Yes    Decreased strength      Physician: Antonio Hadley Jr*    Visit Date: 5/10/2023    Physician Orders: PT Eval and Treat   Medical Diagnosis from Referral:   R53.81 (ICD-10-CM) - Physical debility   R26.89 (ICD-10-CM) - Poor balance      Evaluation Date: 5/3/2023  Authorization Period Expiration: 12/31/23  Plan of Care Expiration: 6/30/2023  Visit # / Visits authorized: 1/ 12     PN due: 6/3/23    Time In: 1345  Time Out: 1431  Total Billable Time: 35 minutes  Total treatment time: 46 minutes    Precautions: Standard and LVAD    Subjective     Pt reports: .hamstrings have been a little sore  He was compliant with home exercise program.  Response to previous treatment: no adverse effects reported  Functional change: ongoing    Pain: not indicated  Location:  not applicable      Objective     Tim received therapeutic exercises to develop strength, endurance, and flexibility for 46 minutes including:     Sitting:   hamstring stretch foot on step 3 x 30 sec (before and after exercise)    Parallel bar:   - heel raises 2 x 10  - hip abduction 2 x 10  - hip extension 2 x 10  - marching 2 x 20    At stairs:  - step ups 2 x 5  - squats BUE support 2 x 5    Recumbent stepper bilateral upper extremity/ lower extremity L3 x 4-5 mins x 2  - 2 mins provided for recovery/ SOB      Home Exercises Provided and Patient Education Provided     Education provided:   - continue with previous home exercise program, added hamstring stretch     Written Home Exercises Provided: Patient instructed to cont prior HEP. And yes  Exercises were reviewed and Tim was able to demonstrate them prior to the end of the session.  Tim demonstrated good  understanding of the education provided.     See EMR under Patient Instructions for  exercises provided prior visit.    Assessment     Tim tolerated first follow up physical therapy session well. He reported onset of hamstring soreness since evaluation. Patient was instructed in a sitting hamstring stretch which he reported helped. cardiovascular training was performed with patient tolerating ~4-5 minutes at ~3.0-3.5 METs. He reported bicep fatigue and shortness of breath. Patient required at least 2 minutes for recovery. Patient tolerated physical therapy session well with intermittent rest periods. Appropriate fatigue reported at conclusion of session. Patient remains appropriate for continued physical therapy to improve activity tolerance and health.      iTm Is progressing well towards his goals.   Pt prognosis is Good.     Pt will continue to benefit from skilled outpatient physical therapy to address the deficits listed in the problem list box on initial evaluation, provide pt/family education and to maximize pt's level of independence in the home and community environment.     Pt's spiritual, cultural and educational needs considered and pt agreeable to plan of care and goals.     Anticipated barriers to physical therapy: longstanding history of cardiac diagnoses    Goals:   Short Term Goals: 5 weeks   Patient will report performance of home exercise program for strengthening 3x/ week to improve fitness and activity tolerance. ongoing  Patient will demonstrate improved confidence in mobility by stating he feel 70% confident in balance while walking across a parking lot. ongoing  Patient will tolerate 10 consecutive minutes on cardiovascular equipment at low resistance to demonstrate improved activity tolerance. ongoing  Patient will demonstrate a gross improvement in lower extremity strength by performing 5 times sit<>stand test in 20 sec.  ongoing     Long Term Goals: 10 weeks   Patient will demonstrate improved confidence in mobility by stating he feel 100% confident in balance while  walking across a parking lot. ongoing  Patient will tolerate 10 consecutive minutes on cardiovascular equipment at moderate resistance to demonstrate improved activity tolerance. ongoing  Patient will demonstrate a gross improvement in lower extremity strength and decreased fall risk by performing 5 times sit<>stand test in 15 sec.  ongoing  Patient will demonstrate a significant change in gait tolerance for walking to his mailbox by ambulating 413 ft in 2 minutes.  ongoing     Plan     Outpatient Physical Therapy 2 times weekly to include the following interventions: Moist Heat/ Ice, Neuromuscular Re-ed, Patient Education, Therapeutic Activities, and Therapeutic Exercise.        Emma Curtis, PT, DPT,   Board-Certified Clinical Specialist in Neurologic Physical Therapy   Certified Brain Injury Specialist    5/10/2023

## 2023-05-10 ENCOUNTER — CLINICAL SUPPORT (OUTPATIENT)
Dept: REHABILITATION | Facility: HOSPITAL | Age: 58
End: 2023-05-10
Attending: INTERNAL MEDICINE
Payer: MEDICARE

## 2023-05-10 ENCOUNTER — ANTI-COAG VISIT (OUTPATIENT)
Dept: CARDIOLOGY | Facility: CLINIC | Age: 58
End: 2023-05-10
Payer: MEDICARE

## 2023-05-10 ENCOUNTER — LAB VISIT (OUTPATIENT)
Dept: LAB | Facility: HOSPITAL | Age: 58
End: 2023-05-10
Attending: INTERNAL MEDICINE
Payer: MEDICARE

## 2023-05-10 DIAGNOSIS — R53.1 DECREASED STRENGTH: ICD-10-CM

## 2023-05-10 DIAGNOSIS — R68.89 DECREASED FUNCTIONAL ACTIVITY TOLERANCE: Primary | ICD-10-CM

## 2023-05-10 DIAGNOSIS — Z95.811 LVAD (LEFT VENTRICULAR ASSIST DEVICE) PRESENT: Chronic | ICD-10-CM

## 2023-05-10 DIAGNOSIS — Z79.01 LONG TERM (CURRENT) USE OF ANTICOAGULANTS: ICD-10-CM

## 2023-05-10 LAB
ALBUMIN SERPL BCP-MCNC: 3.7 G/DL (ref 3.5–5.2)
ALP SERPL-CCNC: 107 U/L (ref 55–135)
ALT SERPL W/O P-5'-P-CCNC: 17 U/L (ref 10–44)
ANION GAP SERPL CALC-SCNC: 8 MMOL/L (ref 8–16)
AST SERPL-CCNC: 26 U/L (ref 10–40)
BILIRUB SERPL-MCNC: 0.3 MG/DL (ref 0.1–1)
BNP SERPL-MCNC: 260 PG/ML (ref 0–99)
BUN SERPL-MCNC: 25 MG/DL (ref 6–20)
CALCIUM SERPL-MCNC: 9.2 MG/DL (ref 8.7–10.5)
CHLORIDE SERPL-SCNC: 107 MMOL/L (ref 95–110)
CO2 SERPL-SCNC: 24 MMOL/L (ref 23–29)
CREAT SERPL-MCNC: 2.3 MG/DL (ref 0.5–1.4)
EST. GFR  (NO RACE VARIABLE): 33 ML/MIN/1.73 M^2
GLUCOSE SERPL-MCNC: 94 MG/DL (ref 70–110)
INR PPP: 2.4 (ref 0.8–1.2)
POTASSIUM SERPL-SCNC: 5 MMOL/L (ref 3.5–5.1)
PROT SERPL-MCNC: 8 G/DL (ref 6–8.4)
PROTHROMBIN TIME: 24.3 SEC (ref 9–12.5)
SODIUM SERPL-SCNC: 139 MMOL/L (ref 136–145)

## 2023-05-10 PROCEDURE — 80053 COMPREHEN METABOLIC PANEL: CPT | Performed by: INTERNAL MEDICINE

## 2023-05-10 PROCEDURE — 93793 PR ANTICOAGULANT MGMT FOR PT TAKING WARFARIN: ICD-10-PCS | Mod: S$GLB,,,

## 2023-05-10 PROCEDURE — 83880 ASSAY OF NATRIURETIC PEPTIDE: CPT | Performed by: INTERNAL MEDICINE

## 2023-05-10 PROCEDURE — 97110 THERAPEUTIC EXERCISES: CPT | Mod: PN | Performed by: PHYSICAL THERAPIST

## 2023-05-10 PROCEDURE — 85610 PROTHROMBIN TIME: CPT | Performed by: INTERNAL MEDICINE

## 2023-05-10 PROCEDURE — 93793 ANTICOAG MGMT PT WARFARIN: CPT | Mod: S$GLB,,,

## 2023-05-10 NOTE — PATIENT INSTRUCTIONS
Chair Sitting        Sit at edge of seat, spine straight, one leg extended. Put a hand on each thigh and bend forward from the hip, keeping spine straight. Allow hand on extended leg to reach toward toes. Support upper body with other arm. Hold 30 seconds.  Repeat 2-3 times per session. Do 1 sessions per day.  Copyright © I. All rights reserved.

## 2023-05-11 ENCOUNTER — TELEPHONE (OUTPATIENT)
Dept: TRANSPLANT | Facility: CLINIC | Age: 58
End: 2023-05-11
Payer: MEDICARE

## 2023-05-11 DIAGNOSIS — Z95.811 HEART REPLACED BY HEART ASSIST DEVICE: Primary | ICD-10-CM

## 2023-05-11 NOTE — TELEPHONE ENCOUNTER
Reviewed labs, Cr 2.3, continues to trend up.  also on upward trend. Patient has not taken any PRN lasix for past several weeks, states weight is up 4lbs but no other heart failure symptoms. Will discuss POC with MD.     Discussed with Dr Rodriguez, will resume lasix 40mg daily and get patient scheduled for an echo and right heart cath. Repeat labs early next week. Patient verbalized understanding.

## 2023-05-12 DIAGNOSIS — Z95.811 HEART REPLACED BY HEART ASSIST DEVICE: Primary | ICD-10-CM

## 2023-05-15 ENCOUNTER — CLINICAL SUPPORT (OUTPATIENT)
Dept: REHABILITATION | Facility: HOSPITAL | Age: 58
End: 2023-05-15
Attending: INTERNAL MEDICINE
Payer: MEDICARE

## 2023-05-15 DIAGNOSIS — R68.89 DECREASED FUNCTIONAL ACTIVITY TOLERANCE: Primary | ICD-10-CM

## 2023-05-15 DIAGNOSIS — R53.1 DECREASED STRENGTH: ICD-10-CM

## 2023-05-15 PROCEDURE — 97110 THERAPEUTIC EXERCISES: CPT | Mod: PN | Performed by: PHYSICAL THERAPIST

## 2023-05-15 NOTE — PROGRESS NOTES
Physical Therapy Daily Treatment Note     Name: Tim Somers Manning  Clinic Number: [id number]    Therapy Diagnosis:   Encounter Diagnoses   Name Primary?    Decreased functional activity tolerance Yes    Decreased strength        Physician: Antonio Hadley Jr*    Visit Date: 5/15/2023    Physician Orders: PT Eval and Treat   Medical Diagnosis from Referral:   R53.81 (ICD-10-CM) - Physical debility   R26.89 (ICD-10-CM) - Poor balance      Evaluation Date: 5/3/2023  Authorization Period Expiration: 12/31/23  Plan of Care Expiration: 6/30/2023  Visit # / Visits authorized: 2/ 12     PN due: 6/3/23    Time In: 1515   Time Out: 1600  Total Billable Time: 30 minutes  Total treatment time: 45 minutes    Precautions: Standard and LVAD    Subjective     Pt reports: no new reports  He was compliant with home exercise program.  Response to previous treatment: tired the next day  Functional change: improved ease with stair negotiation    Pain: not indicated  Location:  not applicable      Objective     Tim received therapeutic exercises to develop strength, endurance, and flexibility for 45 minutes including:     Sitting:   hamstring stretch foot on step 3 x 30 sec (before and after exercise)    Parallel bar:   - heel raises 2 x 10  - hip abduction 2 x 10  - hip extension 2 x 10  - marching 2 x 20  - step ups 2 x 5  - squats BUE support 3 x 5    Recumbent stepper bilateral upper extremity/ lower extremity L3 x 5 mins x 2  - 2 mins provided for recovery/ SOB    Standing at free motion:   Rowing 10# 3 x 10      Home Exercises Provided and Patient Education Provided     Education provided:   - continue with previous home exercise program     Written Home Exercises Provided: Patient instructed to cont prior HEP.   Exercises were reviewed and Tim was able to demonstrate them prior to the end of the session.  Tim demonstrated good  understanding of the education provided.     See EMR under Patient Instructions for  exercises provided prior visit.    Assessment     Tim tolerated physical therapy session well. He reports improved ability to negotiate stairs at home.  Good compliance with home exercise program reported. Improved activity tolerance noted with patient requiring shorter and less frequent rest breaks during exercises today. Patient remains appropriate to continue with physical therapy to improve activity tolerance.      Tim Is progressing well towards his goals.   Pt prognosis is Good.     Pt will continue to benefit from skilled outpatient physical therapy to address the deficits listed in the problem list box on initial evaluation, provide pt/family education and to maximize pt's level of independence in the home and community environment.     Pt's spiritual, cultural and educational needs considered and pt agreeable to plan of care and goals.     Anticipated barriers to physical therapy: longstanding history of cardiac diagnoses    Goals:   Short Term Goals: 5 weeks   Patient will report performance of home exercise program for strengthening 3x/ week to improve fitness and activity tolerance. ongoing  Patient will demonstrate improved confidence in mobility by stating he feel 70% confident in balance while walking across a parking lot. ongoing  Patient will tolerate 10 consecutive minutes on cardiovascular equipment at low resistance to demonstrate improved activity tolerance. ongoing  Patient will demonstrate a gross improvement in lower extremity strength by performing 5 times sit<>stand test in 20 sec.  ongoing     Long Term Goals: 10 weeks   Patient will demonstrate improved confidence in mobility by stating he feel 100% confident in balance while walking across a parking lot. ongoing  Patient will tolerate 10 consecutive minutes on cardiovascular equipment at moderate resistance to demonstrate improved activity tolerance. ongoing  Patient will demonstrate a gross improvement in lower extremity strength  and decreased fall risk by performing 5 times sit<>stand test in 15 sec.  ongoing  Patient will demonstrate a significant change in gait tolerance for walking to his mailbox by ambulating 413 ft in 2 minutes.  ongoing     Plan     Outpatient Physical Therapy 2 times weekly to include the following interventions: Moist Heat/ Ice, Neuromuscular Re-ed, Patient Education, Therapeutic Activities, and Therapeutic Exercise.     Progress to circuit training as tolerated.        Emma Curtis, PT, DPT,   Board-Certified Clinical Specialist in Neurologic Physical Therapy   Certified Brain Injury Specialist    5/15/2023

## 2023-05-17 ENCOUNTER — PATIENT MESSAGE (OUTPATIENT)
Dept: MEDSURG UNIT | Facility: HOSPITAL | Age: 58
End: 2023-05-17
Payer: MEDICARE

## 2023-05-17 ENCOUNTER — LAB VISIT (OUTPATIENT)
Dept: LAB | Facility: HOSPITAL | Age: 58
End: 2023-05-17
Attending: INTERNAL MEDICINE
Payer: MEDICARE

## 2023-05-17 ENCOUNTER — ANTI-COAG VISIT (OUTPATIENT)
Dept: CARDIOLOGY | Facility: CLINIC | Age: 58
End: 2023-05-17
Payer: MEDICARE

## 2023-05-17 DIAGNOSIS — Z95.811 LVAD (LEFT VENTRICULAR ASSIST DEVICE) PRESENT: Chronic | ICD-10-CM

## 2023-05-17 DIAGNOSIS — Z79.01 LONG TERM (CURRENT) USE OF ANTICOAGULANTS: ICD-10-CM

## 2023-05-17 LAB
ALBUMIN SERPL BCP-MCNC: 3.8 G/DL (ref 3.5–5.2)
ALP SERPL-CCNC: 98 U/L (ref 55–135)
ALT SERPL W/O P-5'-P-CCNC: 26 U/L (ref 10–44)
ANION GAP SERPL CALC-SCNC: 9 MMOL/L (ref 8–16)
AST SERPL-CCNC: 42 U/L (ref 10–40)
BILIRUB SERPL-MCNC: 0.2 MG/DL (ref 0.1–1)
BNP SERPL-MCNC: 97 PG/ML (ref 0–99)
BUN SERPL-MCNC: 18 MG/DL (ref 6–20)
CALCIUM SERPL-MCNC: 9 MG/DL (ref 8.7–10.5)
CHLORIDE SERPL-SCNC: 107 MMOL/L (ref 95–110)
CO2 SERPL-SCNC: 21 MMOL/L (ref 23–29)
CREAT SERPL-MCNC: 2.2 MG/DL (ref 0.5–1.4)
EST. GFR  (NO RACE VARIABLE): 34 ML/MIN/1.73 M^2
GLUCOSE SERPL-MCNC: 78 MG/DL (ref 70–110)
INR PPP: 1.7 (ref 0.8–1.2)
POTASSIUM SERPL-SCNC: 4.8 MMOL/L (ref 3.5–5.1)
PROT SERPL-MCNC: 8.3 G/DL (ref 6–8.4)
PROTHROMBIN TIME: 17.6 SEC (ref 9–12.5)
SODIUM SERPL-SCNC: 137 MMOL/L (ref 136–145)

## 2023-05-17 PROCEDURE — 83880 ASSAY OF NATRIURETIC PEPTIDE: CPT | Performed by: INTERNAL MEDICINE

## 2023-05-17 PROCEDURE — 80053 COMPREHEN METABOLIC PANEL: CPT | Performed by: INTERNAL MEDICINE

## 2023-05-17 PROCEDURE — 93793 ANTICOAG MGMT PT WARFARIN: CPT | Mod: ,,,

## 2023-05-17 PROCEDURE — 85610 PROTHROMBIN TIME: CPT | Performed by: INTERNAL MEDICINE

## 2023-05-17 PROCEDURE — 93793 PR ANTICOAGULANT MGMT FOR PT TAKING WARFARIN: ICD-10-PCS | Mod: ,,,

## 2023-05-17 NOTE — PROGRESS NOTES
Patient reports correct Warfarin dose, started Amlodipine 10mg one week ago, fell on 5/12/23 and hit his head and didn't report it to LVAD Team, increased stress, had spinach and kale salads 3 days last week, no other changes reported.

## 2023-05-17 NOTE — PROGRESS NOTES
INR not at goal. Medications, chart, and patient findings reviewed. See calendar for adjustments to dose and follow up plan. I notified VAD coordinator, MARGARET Alexander, of patient reporting fall last week.

## 2023-05-22 ENCOUNTER — ANTI-COAG VISIT (OUTPATIENT)
Dept: CARDIOLOGY | Facility: CLINIC | Age: 58
End: 2023-05-22
Payer: MEDICARE

## 2023-05-22 ENCOUNTER — LAB VISIT (OUTPATIENT)
Dept: LAB | Facility: HOSPITAL | Age: 58
End: 2023-05-22
Attending: INTERNAL MEDICINE
Payer: MEDICARE

## 2023-05-22 DIAGNOSIS — Z95.811 LVAD (LEFT VENTRICULAR ASSIST DEVICE) PRESENT: ICD-10-CM

## 2023-05-22 DIAGNOSIS — Z79.01 LONG TERM (CURRENT) USE OF ANTICOAGULANTS: ICD-10-CM

## 2023-05-22 LAB
INR PPP: 1.8 (ref 0.8–1.2)
PROTHROMBIN TIME: 18.5 SEC (ref 9–12.5)

## 2023-05-22 PROCEDURE — 93793 PR ANTICOAGULANT MGMT FOR PT TAKING WARFARIN: ICD-10-PCS | Mod: S$GLB,,,

## 2023-05-22 PROCEDURE — 36415 COLL VENOUS BLD VENIPUNCTURE: CPT | Performed by: INTERNAL MEDICINE

## 2023-05-22 PROCEDURE — 93793 ANTICOAG MGMT PT WARFARIN: CPT | Mod: S$GLB,,,

## 2023-05-22 PROCEDURE — 85610 PROTHROMBIN TIME: CPT | Performed by: INTERNAL MEDICINE

## 2023-05-23 ENCOUNTER — CLINICAL SUPPORT (OUTPATIENT)
Dept: REHABILITATION | Facility: HOSPITAL | Age: 58
End: 2023-05-23
Attending: INTERNAL MEDICINE
Payer: MEDICARE

## 2023-05-23 ENCOUNTER — PATIENT MESSAGE (OUTPATIENT)
Dept: TRANSPLANT | Facility: CLINIC | Age: 58
End: 2023-05-23
Payer: MEDICARE

## 2023-05-23 DIAGNOSIS — R68.89 DECREASED FUNCTIONAL ACTIVITY TOLERANCE: Primary | ICD-10-CM

## 2023-05-23 DIAGNOSIS — R53.1 DECREASED STRENGTH: ICD-10-CM

## 2023-05-23 PROCEDURE — 97110 THERAPEUTIC EXERCISES: CPT | Mod: PN | Performed by: PHYSICAL THERAPIST

## 2023-05-23 NOTE — PROGRESS NOTES
"  Physical Therapy Daily Treatment Note     Name: Tim Richards  Clinic Number: [id number]    Therapy Diagnosis:   Encounter Diagnoses   Name Primary?    Decreased functional activity tolerance Yes    Decreased strength          Physician: Antonio Hadley Jr*    Visit Date: 5/23/2023    Physician Orders: PT Eval and Treat   Medical Diagnosis from Referral:   R53.81 (ICD-10-CM) - Physical debility   R26.89 (ICD-10-CM) - Poor balance      Evaluation Date: 5/3/2023  Authorization Period Expiration: 12/31/23  Plan of Care Expiration: 6/30/2023  Visit # / Visits authorized: 3/ 12     PN due: 6/3/23    Time In: 1535  Time Out: 1615  Total Billable Time: 25 minutes  Total treatment time: 40 minutes    Precautions: Standard and LVAD    Subjective     Pt reports: no new reports  He was compliant with home exercise program.  Response to previous treatment: tired the next day  Functional change: gained 5 lbs since eval    Pain: not indicated  Location:  not applicable      Objective     Tim received therapeutic exercises to develop strength, endurance, and flexibility for 40 minutes including:    Parallel bar:   - heel raises 2# 2 x 10  - hip abduction 2# 2 x 10  - hip extension 2# 2 x 10    Sit<>stands from 18" box + foam 2 x 7    Recumbent stepper bilateral upper extremity/ lower extremity L3 x 5 mins x 2  - 1 mins provided for recovery/ SOB    Standing at free motion:   Rowing 10# 3 x 10    Standing:   hamstring stretch foot on step 3 x 30 sec (after exercise)  Thoracic rotation 5x each      Home Exercises Provided and Patient Education Provided     Education provided:   - continue with previous home exercise program     Written Home Exercises Provided: Patient instructed to cont prior HEP.   Exercises were reviewed and Tim was able to demonstrate them prior to the end of the session.  Tim demonstrated good  understanding of the education provided.     See EMR under Patient Instructions for exercises " provided prior visit.    Assessment     Tim tolerated physical therapy session well. Increased resistance and challenge added to interventions with appropriate level of fatigue reported. [place]ord rest breaks were provided but patient continued to require multiple rest breaks throughout session.Patient remains appropriate to continue with physical therapy to improve activity tolerance.      Tim Is progressing well towards his goals.   Pt prognosis is Good.     Pt will continue to benefit from skilled outpatient physical therapy to address the deficits listed in the problem list box on initial evaluation, provide pt/family education and to maximize pt's level of independence in the home and community environment.     Pt's spiritual, cultural and educational needs considered and pt agreeable to plan of care and goals.     Anticipated barriers to physical therapy: longstanding history of cardiac diagnoses    Goals:   Short Term Goals: 5 weeks   Patient will report performance of home exercise program for strengthening 3x/ week to improve fitness and activity tolerance. ongoing  Patient will demonstrate improved confidence in mobility by stating he feel 70% confident in balance while walking across a parking lot. ongoing  Patient will tolerate 10 consecutive minutes on cardiovascular equipment at low resistance to demonstrate improved activity tolerance. ongoing  Patient will demonstrate a gross improvement in lower extremity strength by performing 5 times sit<>stand test in 20 sec.  ongoing     Long Term Goals: 10 weeks   Patient will demonstrate improved confidence in mobility by stating he feel 100% confident in balance while walking across a parking lot. ongoing  Patient will tolerate 10 consecutive minutes on cardiovascular equipment at moderate resistance to demonstrate improved activity tolerance. ongoing  Patient will demonstrate a gross improvement in lower extremity strength and decreased fall risk by  performing 5 times sit<>stand test in 15 sec.  ongoing  Patient will demonstrate a significant change in gait tolerance for walking to his mailbox by ambulating 413 ft in 2 minutes.  ongoing     Plan     Outpatient Physical Therapy 2 times weekly to include the following interventions: Moist Heat/ Ice, Neuromuscular Re-ed, Patient Education, Therapeutic Activities, and Therapeutic Exercise.     Progress to circuit training as tolerated.        Emma Curtis, PT, DPT,   Board-Certified Clinical Specialist in Neurologic Physical Therapy   Certified Brain Injury Specialist    5/23/2023

## 2023-05-23 NOTE — PROGRESS NOTES
Kelly reports patient missed 5/19/23 Warfarin dose and took Warfarin 7.5mg on 5/20/23 on his own, took correct dose all other days, has been eating lots of iceberg lettuce salads, no other changes reported.

## 2023-05-25 ENCOUNTER — LAB VISIT (OUTPATIENT)
Dept: LAB | Facility: HOSPITAL | Age: 58
End: 2023-05-25
Attending: INTERNAL MEDICINE
Payer: MEDICARE

## 2023-05-25 ENCOUNTER — CLINICAL SUPPORT (OUTPATIENT)
Dept: REHABILITATION | Facility: HOSPITAL | Age: 58
End: 2023-05-25
Attending: INTERNAL MEDICINE
Payer: MEDICARE

## 2023-05-25 ENCOUNTER — ANTI-COAG VISIT (OUTPATIENT)
Dept: CARDIOLOGY | Facility: CLINIC | Age: 58
End: 2023-05-25
Payer: MEDICARE

## 2023-05-25 DIAGNOSIS — R53.1 DECREASED STRENGTH: ICD-10-CM

## 2023-05-25 DIAGNOSIS — Z95.811 LVAD (LEFT VENTRICULAR ASSIST DEVICE) PRESENT: ICD-10-CM

## 2023-05-25 DIAGNOSIS — Z79.01 LONG TERM (CURRENT) USE OF ANTICOAGULANTS: ICD-10-CM

## 2023-05-25 DIAGNOSIS — R68.89 DECREASED FUNCTIONAL ACTIVITY TOLERANCE: Primary | ICD-10-CM

## 2023-05-25 LAB
INR PPP: 1.7 (ref 0.8–1.2)
PROTHROMBIN TIME: 16.9 SEC (ref 9–12.5)

## 2023-05-25 PROCEDURE — 97110 THERAPEUTIC EXERCISES: CPT | Mod: PN

## 2023-05-25 PROCEDURE — 93793 ANTICOAG MGMT PT WARFARIN: CPT | Mod: S$GLB,,,

## 2023-05-25 PROCEDURE — 85610 PROTHROMBIN TIME: CPT | Performed by: INTERNAL MEDICINE

## 2023-05-25 PROCEDURE — 97112 NEUROMUSCULAR REEDUCATION: CPT | Mod: PN

## 2023-05-25 PROCEDURE — 36415 COLL VENOUS BLD VENIPUNCTURE: CPT | Performed by: INTERNAL MEDICINE

## 2023-05-25 PROCEDURE — 93793 PR ANTICOAGULANT MGMT FOR PT TAKING WARFARIN: ICD-10-PCS | Mod: S$GLB,,,

## 2023-05-25 NOTE — PROGRESS NOTES
"  Physical Therapy Daily Treatment Note     Name: Tim Somers Richards  Clinic Number: [id number]    Therapy Diagnosis:   Encounter Diagnoses   Name Primary?    Decreased functional activity tolerance Yes    Decreased strength        Physician: Antonio Hadley Jr*    Visit Date: 5/25/2023    Physician Orders: PT Eval and Treat   Medical Diagnosis from Referral:   R53.81 (ICD-10-CM) - Physical debility   R26.89 (ICD-10-CM) - Poor balance      Evaluation Date: 5/3/2023  Authorization Period Expiration: 12/31/23  Plan of Care Expiration: 6/30/2023  Visit # / Visits authorized: 4/ 12     PN due: 6/3/23    Time In: 13:30  Time Out: 14:25  Total Billable Time: 55 minutes    Precautions: Standard and LVAD    Subjective     Pt reports: Tired today from MD appts.    He was compliant with home exercise program.  Response to previous treatment: tired the next day  Functional change: gained 5 lbs since eval    Pain: not indicated  Location:  not applicable      Objective     Tim received therapeutic exercises to develop strength, endurance, and flexibility for 40 minutes including:    Parallel bar:   - heel raises 2 x 10  - hip abduction  2 x 10  - hip extension  2 x 10  - squats 2 x 10   - step tap onto 8 in step     Recumbent stepper bilateral upper extremity/ lower extremity L3 x 5 mins x 2, for cardiovascular endurance and strength  - 1 mins provided for recovery/ SOB    Standing hamstring stretch 30 sec x 2     Neuro Re-ed for balance, posture, proprioception:  for 15 minutes   Standing at free motion:   Rowing 10# 3 x 10  Upper extremity chopping 10 lb 2 x 10 each side     At ballet bar:  2 x 10 squats,  1 upper extremity assist   2 x 10 step tap onto 8 in step, 2 upper extremity assist   3 x 10 step up onto 4in step, B upper extremity assist     10 x 2 Sit to stand from 18" black mat, no upper extremity assist, seated rest break in between         Home Exercises Provided and Patient Education Provided     Education " provided:   - continue with previous home exercise program     Written Home Exercises Provided: Patient instructed to cont prior HEP.   Exercises were reviewed and Tim was able to demonstrate them prior to the end of the session.  Tim demonstrated good  understanding of the education provided.     See EMR under Patient Instructions for exercises provided prior visit.    Assessment     Tim tolerated physical therapy session well with minimal fatigue noted.  He was able to tolerate increased time in session with multiple seated rest breaks.  Added cable chops with upper extremity for core and upper extremity strengthening, he denies any abdominal discomfort.  He remains motivated to improve mobility.     Tim Is progressing well towards his goals.   Pt prognosis is Good.     Pt will continue to benefit from skilled outpatient physical therapy to address the deficits listed in the problem list box on initial evaluation, provide pt/family education and to maximize pt's level of independence in the home and community environment.     Pt's spiritual, cultural and educational needs considered and pt agreeable to plan of care and goals.     Anticipated barriers to physical therapy: longstanding history of cardiac diagnoses    Goals:   Short Term Goals: 5 weeks   Patient will report performance of home exercise program for strengthening 3x/ week to improve fitness and activity tolerance. ongoing  Patient will demonstrate improved confidence in mobility by stating he feel 70% confident in balance while walking across a parking lot. ongoing  Patient will tolerate 10 consecutive minutes on cardiovascular equipment at low resistance to demonstrate improved activity tolerance. ongoing  Patient will demonstrate a gross improvement in lower extremity strength by performing 5 times sit<>stand test in 20 sec.  ongoing     Long Term Goals: 10 weeks   Patient will demonstrate improved confidence in mobility by stating he  feel 100% confident in balance while walking across a parking lot. ongoing  Patient will tolerate 10 consecutive minutes on cardiovascular equipment at moderate resistance to demonstrate improved activity tolerance. ongoing  Patient will demonstrate a gross improvement in lower extremity strength and decreased fall risk by performing 5 times sit<>stand test in 15 sec.  ongoing  Patient will demonstrate a significant change in gait tolerance for walking to his mailbox by ambulating 413 ft in 2 minutes.  ongoing     Plan     Outpatient Physical Therapy 2 times weekly to include the following interventions: Moist Heat/ Ice, Neuromuscular Re-ed, Patient Education, Therapeutic Activities, and Therapeutic Exercise.     Progress to circuit training as tolerated.        Mariana Holguin, PT, DPT,   Board-Certified Clinical Specialist in Neurologic Physical Therapy   5/25/2023

## 2023-05-26 NOTE — PROGRESS NOTES
Kelly reports correct Warfarin dose, stated patient had 1 Rum and coke on 5/20/23, no other changes reported.

## 2023-05-29 NOTE — PROGRESS NOTES
See plan of care for physical therapy reassessment    Emma Curtis, PT, DPT,   Board-Certified Clinical Specialist in Neurologic Physical Therapy   Certified Brain Injury Specialist

## 2023-05-29 NOTE — PROGRESS NOTES
HPI:  88F PMH Alzheimer's dementia, Parkinson's Disease, SDH/SAH s/p repair of cerebral aneurysm 1960s, Lung Ca s/p lobectomy of L lung, HTN, HLD, DM, recent admission for sepsis 2/2 colitis and dysphagia discharged to [facility] rehab now re-presenting from [facility] accompanied by formal caregiver with hypotension and chills today at the rehab. Per caregiver at bedside patient was noted to have low BP readings at [facility] today of 94/60 and 108/47. Patient also with chills and non-productive cough that started this morning. Also had high blood sugar reading of 339 today at rehab. Denies fevers, headache, chest pain, SOB, abd pain, N/V/D/C, problems with urination, dysuria, hematuria, extremity pain or swelling. No changes in baseline mental status. Has been eating a soft diet and taking her augmentin which she has not completed the course yet. Patient herself currently only endorsing lower back pain that started today.   Of note, patient with recent admission to [facility] from 5/23-5/26/23 for sepsis 2/2 possible UTI and proctocolitis seen on CT. Patient initially started on CTX for suspected UTI. CT AP revealed proctocolitis, recommended to ruled out neoplasm, however not consistent with goals of care per charlee care proxy and will not be further explored. Patient markedly improved and was discharged to [facility] on 10 day course of PO augmentin (until 6/5). Course also c/b dysphagia which improved on its own; pt also evaluated by S&S and cleared for minced and moist diet.    [facility] called the family yesterday and reported -400, heart rate 120, and referred her to the ER for ?sepsis.     In the ED:   Initial vitals: Temp 97.9F, , /76, RR 16, O2 96% RA   Labs significant for: WBC 15.20, hgb 11.0, Na 133, BUN 28, glucose 243, alk phos 144, lactate 3.5  RVP negative   CXR (wet read) with left-sided pneumonectomy, unchanged from prior  EKG: sinus tachycardia with non-specific ST-T changes, unchanged from prior   Medications: vancomycin 1g IV x1, zosyn 3.375g IV x1, 2LNS   (28 May 2023 21:09)    PAST MEDICAL & SURGICAL HISTORY:  HTN (hypertension)  Diabetes  High cholesterol  Parkinson disease  Alzheimer disease  S/P removal of lung  left, 2000 partial and 2004 complete  S/P cholecystectomy  Personal history of spine surgery  H/O cerebral aneurysm repair  1968, "metal plates"    MEDICATIONS  (STANDING):  aspirin  chewable 81 milliGRAM(s) Oral daily  atorvastatin 5 milliGRAM(s) Oral at bedtime  carbidopa/levodopa  25/100 1 Tablet(s) Oral every 8 hours  dextrose 5%. 1000 milliLiter(s) (50 mL/Hr) IV Continuous <Continuous>  dextrose 5%. 1000 milliLiter(s) (100 mL/Hr) IV Continuous <Continuous>  dextrose 50% Injectable 12.5 Gram(s) IV Push once  dextrose 50% Injectable 25 Gram(s) IV Push once  dextrose 50% Injectable 25 Gram(s) IV Push once  folic acid 1 milliGRAM(s) Oral daily  glucagon  Injectable 1 milliGRAM(s) IntraMuscular once  heparin   Injectable 5000 Unit(s) SubCutaneous every 8 hours  insulin lispro (ADMELOG) corrective regimen sliding scale   SubCutaneous three times a day before meals  levETIRAcetam 500 milliGRAM(s) Oral every 12 hours  lidocaine   4% Patch 1 Patch Transdermal every 24 hours  multivitamin  Chewable 1 Tablet(s) Oral daily  pantoprazole   Suspension 40 milliGRAM(s) Oral before breakfast  piperacillin/tazobactam IVPB.. 3.375 Gram(s) IV Intermittent every 8 hours  polyethylene glycol 3350 17 Gram(s) Oral every 24 hours  sertraline 50 milliGRAM(s) Oral daily  vancomycin  IVPB 1000 milliGRAM(s) IV Intermittent every 24 hours    MEDICATIONS  (PRN):  acetaminophen   Oral Liquid .. 850 milliGRAM(s) Oral every 6 hours PRN Temp greater or equal to 38C (100.4F), Mild Pain (1 - 3)  aluminum hydroxide/magnesium hydroxide/simethicone Suspension 30 milliLiter(s) Oral every 6 hours PRN Dyspepsia  dextrose Oral Gel 15 Gram(s) Oral once PRN Blood Glucose LESS THAN 70 milliGRAM(s)/deciliter      ICU Vital Signs Last 24 Hrs  T(C): 36.9 (29 May 2023 05:42), Max: 37.3 (28 May 2023 19:00)  T(F): 98.4 (29 May 2023 05:42), Max: 99.2 (28 May 2023 19:00)  HR: 96 (29 May 2023 05:42) (96 - 109)  BP: 138/66 (29 May 2023 05:42) (123/76 - 138/66)  BP(mean): --  ABP: --  ABP(mean): --  RR: 17 (29 May 2023 05:42) (16 - 17)  SpO2: 98% (29 May 2023 05:42) (96% - 98%)    O2 Parameters below as of 28 May 2023 22:33  Patient On (Oxygen Delivery Method): room air        NAD  No JVD  Chest decreased breath sounds left clear right  CV RRR s1s2 no murmur  Abd soft non-tender  Ext no edema                          10.0   7.97  )-----------( 220      ( 29 May 2023 05:30 )             31.9   05-29    137  |  106  |  18  ----------------------------<  161<H>  4.0   |  22  |  0.70    Ca    9.4      29 May 2023 05:30  Phos  2.6     05-29  Mg     1.5     05-29    TPro  5.2<L>  /  Alb  3.0<L>  /  TBili  0.3  /  DBili  x   /  AST  17  /  ALT  <5<L>  /  AlkPhos  127<H>  05-29       Updated Dr. Nicolas on current VAD trends, CVP, UO, assessment, Labs. New orders received and implemented.

## 2023-05-30 ENCOUNTER — CLINICAL SUPPORT (OUTPATIENT)
Dept: REHABILITATION | Facility: HOSPITAL | Age: 58
End: 2023-05-30
Attending: INTERNAL MEDICINE
Payer: MEDICARE

## 2023-05-30 DIAGNOSIS — R53.1 DECREASED STRENGTH: ICD-10-CM

## 2023-05-30 DIAGNOSIS — R68.89 DECREASED FUNCTIONAL ACTIVITY TOLERANCE: Primary | ICD-10-CM

## 2023-05-30 PROCEDURE — 97110 THERAPEUTIC EXERCISES: CPT | Mod: PN | Performed by: PHYSICAL THERAPIST

## 2023-05-30 NOTE — PROGRESS NOTES
05/30/2023-Spoke to Kelly regarding 5/29/23 missed INR. Kelly stated patient stated he is scheduled for INR on 5/31/23 so he will have INR done then.

## 2023-05-31 ENCOUNTER — OFFICE VISIT (OUTPATIENT)
Dept: TRANSPLANT | Facility: CLINIC | Age: 58
End: 2023-05-31
Attending: INTERNAL MEDICINE
Payer: MEDICARE

## 2023-05-31 ENCOUNTER — ANTI-COAG VISIT (OUTPATIENT)
Dept: CARDIOLOGY | Facility: CLINIC | Age: 58
End: 2023-05-31
Payer: MEDICARE

## 2023-05-31 ENCOUNTER — CLINICAL SUPPORT (OUTPATIENT)
Dept: TRANSPLANT | Facility: CLINIC | Age: 58
End: 2023-05-31
Payer: MEDICARE

## 2023-05-31 ENCOUNTER — LAB VISIT (OUTPATIENT)
Dept: LAB | Facility: HOSPITAL | Age: 58
End: 2023-05-31
Attending: INTERNAL MEDICINE
Payer: MEDICARE

## 2023-05-31 VITALS — HEIGHT: 73 IN | SYSTOLIC BLOOD PRESSURE: 90 MMHG | TEMPERATURE: 98 F | BODY MASS INDEX: 29.11 KG/M2 | WEIGHT: 219.63 LBS

## 2023-05-31 DIAGNOSIS — I50.42 CHRONIC COMBINED SYSTOLIC AND DIASTOLIC CONGESTIVE HEART FAILURE: Chronic | ICD-10-CM

## 2023-05-31 DIAGNOSIS — Z79.01 ANTICOAGULATION MONITORING, INR RANGE 2-3: ICD-10-CM

## 2023-05-31 DIAGNOSIS — Z95.811 LVAD (LEFT VENTRICULAR ASSIST DEVICE) PRESENT: Chronic | ICD-10-CM

## 2023-05-31 DIAGNOSIS — T82.9XXD LEFT VENTRICULAR ASSIST DEVICE (LVAD) COMPLICATION, SUBSEQUENT ENCOUNTER: ICD-10-CM

## 2023-05-31 DIAGNOSIS — Z95.811 LVAD (LEFT VENTRICULAR ASSIST DEVICE) PRESENT: Primary | Chronic | ICD-10-CM

## 2023-05-31 DIAGNOSIS — N18.32 STAGE 3B CHRONIC KIDNEY DISEASE: ICD-10-CM

## 2023-05-31 DIAGNOSIS — R79.89 ELEVATED TSH: ICD-10-CM

## 2023-05-31 DIAGNOSIS — Z79.01 LONG TERM (CURRENT) USE OF ANTICOAGULANTS: ICD-10-CM

## 2023-05-31 DIAGNOSIS — I13.0 HYPERTENSIVE CARDIOVASCULAR-RENAL DISEASE, STAGE 1-4 OR UNSPECIFIED CHRONIC KIDNEY DISEASE, WITH HEART FAILURE: ICD-10-CM

## 2023-05-31 DIAGNOSIS — Z01.818 PREOPERATIVE TESTING: ICD-10-CM

## 2023-05-31 LAB
ALBUMIN SERPL BCP-MCNC: 4 G/DL (ref 3.5–5.2)
ALP SERPL-CCNC: 98 U/L (ref 55–135)
ALT SERPL W/O P-5'-P-CCNC: 27 U/L (ref 10–44)
ANION GAP SERPL CALC-SCNC: 11 MMOL/L (ref 8–16)
AST SERPL-CCNC: 27 U/L (ref 10–40)
BASOPHILS # BLD AUTO: 0.02 K/UL (ref 0–0.2)
BASOPHILS NFR BLD: 0.5 % (ref 0–1.9)
BILIRUB DIRECT SERPL-MCNC: 0.3 MG/DL (ref 0.1–0.3)
BILIRUB SERPL-MCNC: 0.6 MG/DL (ref 0.1–1)
BNP SERPL-MCNC: 130 PG/ML (ref 0–99)
BUN SERPL-MCNC: 22 MG/DL (ref 6–20)
CALCIUM SERPL-MCNC: 9.8 MG/DL (ref 8.7–10.5)
CHLORIDE SERPL-SCNC: 103 MMOL/L (ref 95–110)
CO2 SERPL-SCNC: 25 MMOL/L (ref 23–29)
CREAT SERPL-MCNC: 2 MG/DL (ref 0.5–1.4)
CRP SERPL-MCNC: 55.1 MG/L (ref 0–8.2)
DIFFERENTIAL METHOD: ABNORMAL
EOSINOPHIL # BLD AUTO: 0.1 K/UL (ref 0–0.5)
EOSINOPHIL NFR BLD: 3.8 % (ref 0–8)
ERYTHROCYTE [DISTWIDTH] IN BLOOD BY AUTOMATED COUNT: 15.9 % (ref 11.5–14.5)
EST. GFR  (NO RACE VARIABLE): 38.4 ML/MIN/1.73 M^2
GLUCOSE SERPL-MCNC: 81 MG/DL (ref 70–110)
HCT VFR BLD AUTO: 46.2 % (ref 40–54)
HGB BLD-MCNC: 14 G/DL (ref 14–18)
IMM GRANULOCYTES # BLD AUTO: 0.01 K/UL (ref 0–0.04)
IMM GRANULOCYTES NFR BLD AUTO: 0.3 % (ref 0–0.5)
INR PPP: 1.9 (ref 0.8–1.2)
LDH SERPL L TO P-CCNC: 199 U/L (ref 110–260)
LYMPHOCYTES # BLD AUTO: 1 K/UL (ref 1–4.8)
LYMPHOCYTES NFR BLD: 27.1 % (ref 18–48)
MAGNESIUM SERPL-MCNC: 2.3 MG/DL (ref 1.6–2.6)
MCH RBC QN AUTO: 26.9 PG (ref 27–31)
MCHC RBC AUTO-ENTMCNC: 30.3 G/DL (ref 32–36)
MCV RBC AUTO: 89 FL (ref 82–98)
MONOCYTES # BLD AUTO: 0.5 K/UL (ref 0.3–1)
MONOCYTES NFR BLD: 12.3 % (ref 4–15)
NEUTROPHILS # BLD AUTO: 2 K/UL (ref 1.8–7.7)
NEUTROPHILS NFR BLD: 56 % (ref 38–73)
NRBC BLD-RTO: 0 /100 WBC
PHOSPHATE SERPL-MCNC: 2.4 MG/DL (ref 2.7–4.5)
PLATELET # BLD AUTO: 239 K/UL (ref 150–450)
PMV BLD AUTO: 9.7 FL (ref 9.2–12.9)
POTASSIUM SERPL-SCNC: 4.1 MMOL/L (ref 3.5–5.1)
PREALB SERPL-MCNC: 18 MG/DL (ref 20–43)
PROT SERPL-MCNC: 9 G/DL (ref 6–8.4)
PROTHROMBIN TIME: 19.3 SEC (ref 9–12.5)
RBC # BLD AUTO: 5.2 M/UL (ref 4.6–6.2)
SODIUM SERPL-SCNC: 139 MMOL/L (ref 136–145)
WBC # BLD AUTO: 3.65 K/UL (ref 3.9–12.7)

## 2023-05-31 PROCEDURE — 93750 INTERROGATION VAD IN PERSON: CPT | Mod: PBBFAC | Performed by: INTERNAL MEDICINE

## 2023-05-31 PROCEDURE — 93750 PR INTERROGATE VENT ASSIST DEV, IN PERSON, W PHYSICIAN ANALYSIS: ICD-10-PCS | Mod: S$PBB,,, | Performed by: INTERNAL MEDICINE

## 2023-05-31 PROCEDURE — 99215 OFFICE O/P EST HI 40 MIN: CPT | Mod: S$PBB,,, | Performed by: INTERNAL MEDICINE

## 2023-05-31 PROCEDURE — 86140 C-REACTIVE PROTEIN: CPT | Performed by: INTERNAL MEDICINE

## 2023-05-31 PROCEDURE — 99999 PR PBB SHADOW E&M-EST. PATIENT-LVL V: CPT | Mod: PBBFAC,,, | Performed by: INTERNAL MEDICINE

## 2023-05-31 PROCEDURE — 82248 BILIRUBIN DIRECT: CPT | Performed by: INTERNAL MEDICINE

## 2023-05-31 PROCEDURE — 84134 ASSAY OF PREALBUMIN: CPT | Performed by: INTERNAL MEDICINE

## 2023-05-31 PROCEDURE — 85025 COMPLETE CBC W/AUTO DIFF WBC: CPT | Performed by: INTERNAL MEDICINE

## 2023-05-31 PROCEDURE — 83735 ASSAY OF MAGNESIUM: CPT | Performed by: INTERNAL MEDICINE

## 2023-05-31 PROCEDURE — 99215 OFFICE O/P EST HI 40 MIN: CPT | Mod: PBBFAC | Performed by: INTERNAL MEDICINE

## 2023-05-31 PROCEDURE — 99215 PR OFFICE/OUTPT VISIT, EST, LEVL V, 40-54 MIN: ICD-10-PCS | Mod: S$PBB,,, | Performed by: INTERNAL MEDICINE

## 2023-05-31 PROCEDURE — 36415 COLL VENOUS BLD VENIPUNCTURE: CPT | Performed by: INTERNAL MEDICINE

## 2023-05-31 PROCEDURE — 83880 ASSAY OF NATRIURETIC PEPTIDE: CPT | Performed by: INTERNAL MEDICINE

## 2023-05-31 PROCEDURE — 93750 INTERROGATION VAD IN PERSON: CPT | Mod: S$PBB,,, | Performed by: INTERNAL MEDICINE

## 2023-05-31 PROCEDURE — 99999 PR PBB SHADOW E&M-EST. PATIENT-LVL V: ICD-10-PCS | Mod: PBBFAC,,, | Performed by: INTERNAL MEDICINE

## 2023-05-31 PROCEDURE — 80053 COMPREHEN METABOLIC PANEL: CPT | Performed by: INTERNAL MEDICINE

## 2023-05-31 PROCEDURE — 85610 PROTHROMBIN TIME: CPT | Performed by: INTERNAL MEDICINE

## 2023-05-31 PROCEDURE — 83615 LACTATE (LD) (LDH) ENZYME: CPT | Performed by: INTERNAL MEDICINE

## 2023-05-31 PROCEDURE — 84100 ASSAY OF PHOSPHORUS: CPT | Performed by: INTERNAL MEDICINE

## 2023-05-31 NOTE — PROGRESS NOTES
"Date of Implant with Heartmate 3 LVAD: 7/13/22    PATIENT ARRIVED IN CLINIC:  Ambulatory   Accompanied by: N/A  Complaints/reason for visit today: routine    Vitals  Temperature, oral:   Temp Readings from Last 1 Encounters:   05/31/23 98.3 °F (36.8 °C) (Oral)     Blood Pressure:   BP Readings from Last 3 Encounters:   05/31/23 (!) 90/0   04/13/23 (!) 98/0   04/07/23 123/72        VAD Interrogation:  TXP SACHI INTERROGATIONS 5/31/2023 4/13/2023 2/23/2023   Type HeartMate3 HeartMate3 HeartMate3   Flow 5.5 5.4 5.6   Speed 5400 6400 6400   PI 1.3 2.2 4.1   Power (Jackson) 5.7 5.6 5.7   LSL 6000 6000 6000   Pulsatility Pulse No Pulse Intermittent pulse     HCT:   Lab Results   Component Value Date    HCT 46.2 05/31/2023    HCT 31 (L) 09/27/2022     Problems / Issues / Alarms with VAD if any: None noted  VAD Sounds: HM3 Smooth    VAD Binder With Patient: No  Reviewed VAD Numbers In Binder: No    Equipment:  Emergency Equipment With Patient: Yes  Any Equipment Issues: None noted   It is medically necessary to ensure patient has properly functioning equipment and wearables to prevent infection, injury or death to patient.     DLES Assessment:  Appearance Of Driveline: 1  Antibiotics: NO  Velour: No  Manual & Visual Inspection Of Driveline: No kinks or tears noted  Stabilization Device In Use: yes, sun securement device    Heartmate 3 Module Cable:  No yellow exposed    Patient MyChart Questionnaire:   VAD QUESTIONNAIRE ANSWERS 7/7/2021   Have you had any LVAD related issues or alarms? No   If yes, please provide additional details: -   Have you had any LVAD Equipment issues? Yes   If yes, please provide additional details: Controller or batteries   How often do you do your LVAD dressing change? Every other day   What type of dressing change do you do?  Daily "scrubby" kits   Do you need dressing change supplies? Yes   If yes, what kind (i.e. boxes/kits)? Box   Do you need any new LVAD Accessories? (i.e Battery Holster Vest, " Hebert Vest, RT/LT Consolidation Bag) No   If yes, what kind of accessories do you need? Na   Have you been using your Monthly Equipment Checklist? Yes   Description of Stools: Normal and formed without blood   How Often: Weekly   Color Of Urine: Clear/Yellow   Are you experiencing: Anxiety   Do you see a: Psychiatrist   How many hours per night are you sleeping? 7   Do you take any medications to help you fall asleep? Yes   If yes, what medications do you take to help you fall asleep?  Xanex   Are you Showering? Yes   Do you change your dressing immediately after you dry off?  Yes   Are you exercising? Yes   If so, what do you do and for how long per day? 1hr   How often are you exercising? Daily   Are you working or what do you do to stay active? N/a   Are you driving? Yes   Do you know that if you have an alarm while driving you need to pull over first before looking at your alarm to avoid an accident? Yes   Are you in pain? No   If so, please rate: -   What hurts? -   Describe pain: -   Do you take any medications for pain?  No   If yes, what kind? -   Does this relieve the pain? -   How often are you taking pain medicine? -   What can we do for you? Anything   Is your appointment today? Yes   Do you have your Emergency Bag with you? Yes   Do you have your VAD Binder with you in clinic today?  No        Assessment:   PAIN: NO  Complaints Of Nausea / Vomiting: None noted    Appearance and Frequency Of Stools: normal and formed without blood & daily  Color Of Urine: clear/yellow  Coping/Depression/Anxiety: coping okay, anxious, and depressed  Sleep Habits: 4-5 hrs /night with naps  Sleep Aids: None noted  Showering: No  Activity/Exercise: Outpatient PT   Driving: Yes. Reminded to pull over should there be an alarm before looking down at controller.    Quality of Life Survey  Mobility I have some problems in walking about  Self- Care I have no problems with self- care  Usual Activities I have some problems with  performing my usual activities  Pain/ Discomfort I have no pain or discomfort  Anxiety/ Depression I am moderately anxious or depressed   Additional Comments: N/A    DLES Dressing Care:   Frequency of Dressing Changes: daily & daily kit  Pt In Need Of Management Kits?:yes -   2 Box of daily kit  It is medically necessary to have VAD management kits in order to prevent infection or to assist in the healing of an infected DLES.    Labs:    Chemistry        Component Value Date/Time     05/17/2023 1316    K 4.8 05/17/2023 1316     05/17/2023 1316    CO2 21 (L) 05/17/2023 1316    BUN 18 05/17/2023 1316    CREATININE 2.2 (H) 05/17/2023 1316    GLU 78 05/17/2023 1316        Component Value Date/Time    CALCIUM 9.0 05/17/2023 1316    ALKPHOS 98 05/17/2023 1316    AST 42 (H) 05/17/2023 1316    ALT 26 05/17/2023 1316    BILITOT 0.2 05/17/2023 1316    ESTGFRAFRICA 37.6 (A) 07/31/2022 2027    EGFRNONAA 32.5 (A) 07/31/2022 2027            Magnesium   Date Value Ref Range Status   04/13/2023 2.2 1.6 - 2.6 mg/dL Final       Lab Results   Component Value Date    WBC 3.65 (L) 05/31/2023    HGB 14.0 05/31/2023    HCT 46.2 05/31/2023    MCV 89 05/31/2023     05/31/2023       Lab Results   Component Value Date    INR 1.9 (H) 05/31/2023    INR 1.7 (H) 05/25/2023    INR 1.8 (H) 05/22/2023       BNP   Date Value Ref Range Status   05/17/2023 97 0 - 99 pg/mL Final     Comment:     Values of less than 100 pg/ml are consistent with non-CHF populations.   05/10/2023 260 (H) 0 - 99 pg/mL Final     Comment:     Values of less than 100 pg/ml are consistent with non-CHF populations.   05/03/2023 180 (H) 0 - 99 pg/mL Final     Comment:     Values of less than 100 pg/ml are consistent with non-CHF populations.       LD   Date Value Ref Range Status   04/13/2023 204 110 - 260 U/L Final     Comment:     Results are increased in hemolyzed samples.   04/07/2023 259 110 - 260 U/L Final     Comment:     Results are increased in  hemolyzed samples.  *Result may be interfered by visible hemolysis     02/23/2023 294 (H) 110 - 260 U/L Final     Comment:     Results are increased in hemolyzed samples.       Labs reviewed with patient: YES     Medication Reconciliation: per MA.  New Medication Detail Provided: yes  Coumadin Managed by: [facility]    Education: Reviewed driveline care, emergency procedures, how to change the controller, alarms with patient, as well as discussed how to page the VAD coordinator in case of an emergency.   Educated patient/family that chest compressions are allowed in the event they are needed.    Reminded patient/caregiver not to touch their face and to cover their mouth when they cough or sneeze.   Vaccines: Pt informed that we are encouraging all VAD patients to receive the Flu and COVID vaccines and boosters. Informed pt that they can take tylenol but should avoid other NSAIDs.       Plans/Needs: Patient seen in clinic for routine follow up with Dr. Hadley. Patient is doing well. He is working on outpatient PT, he is scheduled for a RHC on 6/2 CR has been trending upwards with unclear cause. Patient would like to R/S, patient to stop at the .  Patient to RTC in 3 months with lipids, TSH/T4.    Hurricane Season: Yes, discussed with patient: With hurricane season approaching, we want to make sure you are fully prepared for any emergency.  Should the National Weather Service or your local authorities recommend a voluntary or mandatory evacuation of your area, The VAD team requires you to evacuate to a safe place.  Remember, when it is a mandatory evacuation, traffic will become an issue for your limited battery power.  Therefore, we strongly urge you to evacuate early.      The VAD team advises you to have the following in place before hurricane season:  Have an evacuation plan in place including places to evacuate, names and phone numbers.  This information is required to be given to the VAD  coordinator.  Have your VAD emergency contact numbers with you.  Make sure your prescriptions will not run out by the end of September.  Make sure you have enough medications, including pills, inhalers, patches,     etc. to take, should you be gone for more than 2 weeks.  Make sure ALL of your batteries are fully charged.  Bring enough dressing change supplies to last for at least 2 weeks.  Bring your VAD binder with you.  Make sure your binder is updated and complete with alarms reference card, patient hand book, emergency contact numbers, daily log sheets, etc.    If you do not have family or friends as an evacuation destination, we recommend evacuating to a safe area.   Do NOT evacuate to [facility].  The VAD team wants to stress the importance of planning for your evacuation in the event of a hurricane.  If you have any LVAD questions or issues, please contact the LVAD coordinator.

## 2023-05-31 NOTE — PROGRESS NOTES
Spoke with patient regarding recent INR results .  Patient questioned and verified correct dosage . Reported right leg bruising and has incorporated iceberg lettuce w/ cilantro  salads daily for the past two weeks due to red meat causing kidney levels to increase . No other changes reported.

## 2023-05-31 NOTE — PROGRESS NOTES
Subjective:   Patient ID:  Tim Richards is a 57 y.o. male who presents for LVAD followup visit.    Implant Date: Heartmate II on 3/8/2018 (outflow graft changed 3/9/2018), exchanged to Heartmate 3 on 7/13/2022  Initials:RBB     Heartmate 3 RPM 6400     INR goal: 2-3 (INR meter)  NO Bridge with heparin  Antiplatelets: ASA 81mg     GIB: 7/16/19 (10 units of blood)    TXP SACHI INTERROGATIONS 5/31/2023   Type HeartMate3   Flow 5.5   Speed 5400   PI 1.3   Power (Jackson) 5.7   LSL 6000   Pulsatility Pulse   Interrogation of Ventricular assist device was performed with physician analysis of device parameters and review of device function. I have personally reviewed the interrogation findings and agree with findings as stated.     HPI  58 yo BM with stage D HFrEF who was previously supported with a HM2 (implanted 3/8/2018 as DT-VAD) but required pump exchange (HM3 pump exchange 7/13/2022) for thrombosis of pump post COVID-19 PNA and AHRF.  Prior to pump exchange had ADHF and with diuresis and in the face of hypokalemia had cardiac arrest.  He has long hx of VT with recurrent shocks and is on chronic amiodarone and mexiletine.      His post-op course following pump exchange was prolonged and complicated by worsening cardiogenic shock/RV failure requiring VA-ECMO.  He also had recurrent VT as well as atrial flutter with RVR.    In June/July 2022 he was tapered of steroids (on for amiodarone hyperthyroid) due to concern for avascular necrosis of hip.     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 He was on ertapenem and voriconazole for this. He then saw ID on 1/6/22 and  "they stopped the ertapenem and DC PICC line. He continues on voricaonazole, with duration being 6 months minimum to potentially lifelong (Dr. Kaufman's preference per snapshot).    He was seen most recently by Dr. LITTLEJOHN who had made arrangements for right heart catheterization.  The patient tells me he canceled it as wife is getting a colonoscopy the same day.  He also tells me he is convinced that his creatinine would improve if he was better about following his diet.  We discussed this at length today and he is now willing to undergo a right heart catheterization and we are in the process of getting this rescheduled at a time convenient for him.  I have also told him how to take his Xanax as preop for the procedure.    He reports that he is no longer taking ertapenem or voriconazole which is different that as I understood plan last time that I saw him so will ask coordinators to let ID know and send for consult luca with increase in CRP noted today.    Review of Systems   Constitutional: Positive for malaise/fatigue. Negative for chills, fever, weight gain and weight loss.   Cardiovascular:  Positive for dyspnea on exertion. Negative for chest pain, claudication, irregular heartbeat, leg swelling, near-syncope, orthopnea, palpitations, paroxysmal nocturnal dyspnea and syncope.   Respiratory:  Negative for cough, shortness of breath, sputum production and wheezing.    Hematologic/Lymphatic: Negative for bleeding problem. Does not bruise/bleed easily.   Musculoskeletal:  Negative for falls.   Gastrointestinal:  Negative for change in bowel habit, hematemesis, hematochezia and melena.   Genitourinary:  Negative for hematuria.   Neurological:  Positive for dizziness, light-headedness and loss of balance. Negative for brief paralysis, focal weakness, headaches and weakness.     Objective:   Blood pressure (!) 90/0, temperature 98.3 °F (36.8 °C), temperature source Oral, height 6' 1" (1.854 m), weight 99.6 kg (219 lb 9.6 " "oz).body mass index is 28.97 kg/m².  /79 calc MAP 87 and doppler: 90  Physical Exam  Constitutional:       General: He is not in acute distress.     Appearance: He is well-developed. He is not ill-appearing, toxic-appearing or diaphoretic.      Comments: /79 calc MAP 87 and doppler: 90 (BP Location: Left arm, Patient Position: Sitting)   Temp 98.3 °F (36.8 °C) (Oral)   Ht 6' 1" (1.854 m)   Wt 99.6 kg (219 lb 9.6 oz)   BMI 28.97 kg/m²   On 4/13/2023 wt 218#  Friendly BM in NAD   HENT:      Head: Normocephalic and atraumatic.   Eyes:      General: No scleral icterus.        Right eye: No discharge.         Left eye: No discharge.      Conjunctiva/sclera: Conjunctivae normal.   Neck:      Thyroid: No thyromegaly.      Vascular: JVD present.      Trachea: No tracheal deviation.      Comments: External jugular is distended and venous pulsations extend to the upper neck  Cardiovascular:      Comments: Normal LVAD sounds; DL 1  Pulmonary:      Effort: Pulmonary effort is normal. No respiratory distress.      Breath sounds: Normal breath sounds. No wheezing or rales.   Abdominal:      General: Bowel sounds are normal. There is no distension.      Palpations: Abdomen is soft. There is no mass.      Tenderness: There is no abdominal tenderness. There is no guarding or rebound.   Musculoskeletal:         General: Swelling (1+) present. No tenderness.      Right lower leg: Edema present.      Left lower leg: Edema present.   Skin:     General: Skin is warm and dry.   Neurological:      General: No focal deficit present.      Mental Status: He is alert. Mental status is at baseline.   Psychiatric:         Mood and Affect: Mood normal.         Behavior: Behavior normal.         Thought Content: Thought content normal.         Judgment: Judgment normal.     Lab Results   Component Value Date     (H) 05/31/2023     05/31/2023    K 4.1 05/31/2023    MG 2.3 05/31/2023     05/31/2023    CO2 25 " 05/31/2023    PHOS 2.4 (L) 05/31/2023    BUN 22 (H) 05/31/2023    CREATININE 2.0 (H) 05/31/2023    GLU 81 05/31/2023    HGBA1C 5.4 04/26/2021    AST 27 05/31/2023    ALT 27 05/31/2023    ALBUMIN 4.0 05/31/2023    PROT 9.0 (H) 05/31/2023    BILITOT 0.6 05/31/2023    WBC 3.65 (L) 05/31/2023    HGB 14.0 05/31/2023    HCT 46.2 05/31/2023    HCT 31 (L) 09/27/2022     05/31/2023    INR 1.9 (H) 05/31/2023    INR 2.5 12/08/2022    INR 3.1 (H) 09/24/2022     05/31/2023    TSH 11.914 (H) 02/01/2023    L4NAFUM 8.4 08/14/2022    FREET4 1.42 02/01/2023    CHOL 174 02/23/2023    HDL 77 (H) 02/23/2023    LDLCALC 81.0 02/23/2023    TRIG 80 02/23/2023     0 Result Notes             Component Ref Range & Units 13:41  (5/31/23) 1 mo ago  (4/13/23) 3 mo ago  (2/23/23) 4 mo ago  (1/27/23) 4 mo ago  (1/12/23) 6 mo ago  (12/1/22) 6 mo ago  (11/3/22)   CRP 0.0 - 8.2 mg/L 55.1 High   26.2 High   22.1 High   69.1 High   38.5 High   21.0 High   41.9 High            11/23/2022 ECHO  There is an LVAD present. Base speed is 6400 RPMs. The pump type is a Heartmate III. The interventricular septum appears to bow into the right ventricle. The aortic valve does not open.  The left ventricle is severely enlarged with eccentric hypertrophy and severely decreased systolic function.  The estimated ejection fraction is 13%.  There is severe left ventricular global hypokinesis.  There is abnormal paradoxical septal wall motion.  Left ventricular diastolic dysfunction.  Moderate right ventricular enlargement with moderately reduced right ventricular systolic function.  Moderate left atrial enlargement.  Moderate right atrial enlargement.  Mild aortic regurgitation.  Normal central venous pressure (3 mmHg).  The estimated PA systolic pressure is 32 mmHg.       Assessment:      1. LVAD (left ventricular assist device) present    2. Left ventricular assist device (LVAD) complication, subsequent encounter    3. Elevated TSH    4. Chronic combined  systolic and diastolic congestive heart failure    5. Anticoagulation monitoring, INR range 2-3    6. Stage 3b chronic kidney disease    7. Hypertensive cardiovascular-renal disease, stage 1-4 or unspecified chronic kidney disease, with heart failure    8. Preoperative testing        Plan:   His baseline Cr is normal but now considerably higher and increase has occurred after last echo Nov 2022.  His last RHC was 2018.  He has echo scheduled for July (earliest available) and was to have RHC but says he canceled.  I feel that he needs both RHC and echo.  Renal function of major concern and these procedures afford us the best opportunity to improve renal function.    He reports that he is no longer taking ertapenem or voriconazole which is different that as I understood plan last time that I saw him so will ask coordinators to let ID know and send for consult luca with increase in CRP noted today.  Will also send note to Dr. Kaufmna.    He needs f/u on TSH with next INR    Supplies ordered are medically necessary for care of LVAD and DL    Post LVAD goals of care are to feel stronger and be more active, control HF and avoid admission.    Patient is now NYHA II    Recommend 2 gram sodium restriction and 1500cc fluid restriction.  Encourage physical activity with graded exercise program.  Requested patient to weigh themselves daily, and to notify us if their weight increases by more than 3 lbs in 1 day or 5 lbs in 1 week.     Listed for transplant: No    UNOS Patient Status  Functional Status: 80% - Normal activity with effort: some symptoms of disease  Physical Capacity: No Limitations  Working for Income: No  If no, reason not working: Disability

## 2023-05-31 NOTE — PLAN OF CARE
Physical Therapy Daily Treatment Note     Name: Tim Richards  Clinic Number: [id number]    Therapy Diagnosis:   Encounter Diagnoses   Name Primary?    Decreased functional activity tolerance Yes    Decreased strength        Physician: Antonio Hadley Jr*    Visit Date: 5/30/2023    Physician Orders: PT Eval and Treat   Medical Diagnosis from Referral:   R53.81 (ICD-10-CM) - Physical debility   R26.89 (ICD-10-CM) - Poor balance      Evaluation Date: 5/3/2023  Authorization Period Expiration: 12/31/23  Plan of Care Expiration: 6/30/2023  Visit # / Visits authorized: 5/ 12     PN due: 6/3/23    Time In: 1345  Time Out: 1430  Total Billable Time: 45 minutes    Precautions: Standard and LVAD    Subjective     Pt reports: no new reports  He was compliant with home exercise program.  Response to previous treatment: no adverse effects reported  Functional change: stair climbing takes less effort    Pain: not reported  Location:  not applicable      CMS Impairment/Limitation/Restriction for FOTO Balance Survey    Therapist reviewed FOTO scores for Tim Richards on 5/30/2023.   FOTO documents entered into [employer] - see Media section.    Limitation Score: 53%        Objective      Evaluation 5/30/23   Single Limb Stance R LE Not tested   (<10 sec = HIGH FALL RISK) Not tested    Single Limb Stance L LE Not tested   (<10 sec = HIGH FALL RISK) Not tested    5 times sit-stand 25 seconds, (6-7/10 dizziness)    21 sec (4/10 dizziness)   2 mins walk 279 ft 325 ft      5 times sit<>stand Cutoff scores:  >12 sec= fall risk  PD: >16 seconds= fall risk  Vestibular/balance: 15 seconds over 65 years  CVA: 12 seconds     Postural control:   MCTSIB:  3. Eyes Open/feet together/Foam: 30 seconds, minimal sway  4. Eyes Closed/feet together/Foam: 30 seconds, moderate sway    2 mins walk= 325 ft  - SSWS= 0.82 m/s    Normal Walking Speed:  Healthy Adults (m/s)    Self Selected Fast    Men Women Men Women   20's 1.39 1.41 2.53 2.47    30's 1.46 1.42 2.45 2.34   40's 1.46 1.39 2.46 2.12   50's 1.39 1.40 2.07 2.01   60's 1.36 1.30 1.93 1.77   70's 1.33 1.27 2.08 1.74     Tim received therapeutic exercises to develop strength, endurance, and flexibility for 45 minutes including:  Testing as listed above    - step tap onto 8 in step     Recumbent stepper bilateral upper extremity/ lower extremity L3 x 5 mins x 2, for cardiovascular endurance and strength  - 1 mins provided for recovery/ SOB    Standing at free motion:   Rowing 10# 3 x 10  Upper extremity chopping 10 lb 2 x 10 each side     Standing hamstring stretch 30 sec x 2       Home Exercises Provided and Patient Education Provided     Education provided:   - continue with previous home exercise program   - reviewed progress noted, continued physical therapy recommended    Written Home Exercises Provided: Patient instructed to cont prior HEP.   Exercises were reviewed and Tim was able to demonstrate them prior to the end of the session.  Tim demonstrated good  understanding of the education provided.     See EMR under Patient Instructions for exercises provided prior visit.    Assessment   Assessment period: 5/3/23 to 5/30/2023    Tim tolerates physical therapy sessions well.  Patient reports improved ease with stair negotiation at home. He also reports improved confidence for negotiating crowded areas, getting bumped into while walking, and negotiating ramps. Patient continues to require intermittent rest breaks during 45 minute session due to fatigue and shortness of breath. Improved cardiovascular tolerance noted with patient demonstrating improved consecutive minutes on cardiovascular equipment. Patient demonstrates improved strength and endurance with increased speed on both 5 times sit<>stand test and 2 minute walk test.  Patient is tolerating 5-5.5 minutes of consecutive cardiovascular activity prior to needing a rest break. Patient ambulates at a speed significantly slower  than average for age and gender. Patient can benefit from continued skilled physical therapy to progress home exercise program and physical therapy interventions as needed to improve activity tolerance/ cardiovascular health.      Tim Is progressing well towards his goals.   Pt prognosis is Good.     Pt will continue to benefit from skilled outpatient physical therapy to address the deficits listed in the problem list box on initial evaluation, provide pt/family education and to maximize pt's level of independence in the home and community environment.     Pt's spiritual, cultural and educational needs considered and pt agreeable to plan of care and goals.     Anticipated barriers to physical therapy: longstanding history of cardiac diagnoses    Goals:   Status as of 5/30/23  Short Term Goals: 5 weeks   Patient will report performance of home exercise program for strengthening 3x/ week to improve fitness and activity tolerance. Met 5/30/23  Patient will demonstrate improved confidence in mobility by stating he feels 70% confident in balance while walking across a parking lot. ongoing  Patient will tolerate 10 consecutive minutes on cardiovascular equipment at low resistance to demonstrate improved activity tolerance. ongoing  Patient will demonstrate a gross improvement in lower extremity strength by performing 5 times sit<>stand test in 20 sec.  ongoing     Long Term Goals: 10 weeks   Patient will demonstrate improved confidence in mobility by stating he feel 100% confident in balance while walking across a parking lot. ongoing  Patient will tolerate 10 consecutive minutes on cardiovascular equipment at moderate resistance to demonstrate improved activity tolerance. ongoing  Patient will demonstrate a gross improvement in lower extremity strength and decreased fall risk by performing 5 times sit<>stand test in 15 sec.  ongoing  Patient will demonstrate a significant change in gait tolerance for walking to his  mailbox by ambulating 413 ft in 2 minutes.  ongoing     Plan     Outpatient Physical Therapy 2 times weekly to include the following interventions: Moist Heat/ Ice, Neuromuscular Re-ed, Patient Education, Therapeutic Activities, and Therapeutic Exercise.     Progress to circuit training as tolerated. Increase duration of recumbent stepper to 6 mins as tolerated       Emma Curtis, PT, DPT,   Board-Certified Clinical Specialist in Neurologic Physical Therapy   Certified Brain Injury Specialist    5/30/2023

## 2023-05-31 NOTE — Clinical Note
He reports that he is no longer taking ertapenem or voriconazole which is different that as I understood plan last time that I saw him so will ask coordinators to let ID know and send for consult luca with increase in CRP noted today.  I could not tell who in ID is following him tonight when I reviewed his chart so please track this down and get it to their attention.  I am also sending this to Dr. Kaufman for his information and perusal

## 2023-05-31 NOTE — LETTER
May 31, 2023        Nader Chiu  [address]  [address]  [place] [address]  Phone: [phone]  Fax: [phone]     Nader Chiu  [address]  [address]  [place] [address]  Phone: [phone]  Fax: [phone]             [id number]  [address]  [place] [address]  Phone: [phone]   Patient: Tim Richards   MR Number: [id number]   YOB: 1965   Date of Visit: 5/31/2023       Dear Dr. Nader Chiu, Nader Chiu    Thank you for referring Tim Richadrs to me for evaluation. Attached you will find relevant portions of my assessment and plan of care.    If you have questions, please do not hesitate to call me. I look forward to following Tim Richards along with you.    Sincerely,    Antonio Hadley Jr, MD    Enclosure    If you would like to receive this communication electronically, please contact [email or web address] or [phone] to request [employer] access.    [employer] is a tool which provides read-only access to select patient information with whom you have a relationship. Its easy to use and provides real time access to review your patients record including encounter summaries, notes, results, and demographic information.    If you feel you have received this communication in error or would no longer like to receive these types of communications, please e-mail [email or web address]

## 2023-05-31 NOTE — PROGRESS NOTES
Patient advised of dose instructions and verbalized understanding.  Patient rescheduled appointment from 6/5/23 to 6/6/23.

## 2023-05-31 NOTE — PROCEDURES
[facility] INTERROGATIONS 5/31/2023 4/13/2023 2/23/2023 1/29/2023 1/29/2023 1/29/2023 1/29/2023   Type HeartMate3 HeartMate3 HeartMate3 - - - -   Flow 5.5 5.4 5.6 - - - -   Speed 5400 6400 6400 - - - -   PI 1.3 2.2 4.1 - - - -   Power ([place]) 5.7 5.6 5.7 - - - -   LSL 6000 6000 6000 - - - -   Pulsatility Pulse No Pulse Intermittent pulse Pulse Pulse Pulse Intermittent pulse   }Interrogation of Ventricular assist device was performed with physician analysis of device parameters and review of device function. I have personally reviewed the interrogation findings and agree with findings as stated.

## 2023-05-31 NOTE — PATIENT INSTRUCTIONS
Take xanax 1 mg 30 to 60 minutes before right heart cath and repeat dose one hour later if needed; someone must drive you to and from procedure

## 2023-06-01 ENCOUNTER — TELEPHONE (OUTPATIENT)
Dept: TRANSPLANT | Facility: CLINIC | Age: 58
End: 2023-06-01
Payer: MEDICARE

## 2023-06-01 ENCOUNTER — CLINICAL SUPPORT (OUTPATIENT)
Dept: REHABILITATION | Facility: HOSPITAL | Age: 58
End: 2023-06-01
Attending: INTERNAL MEDICINE
Payer: MEDICARE

## 2023-06-01 DIAGNOSIS — R68.89 DECREASED FUNCTIONAL ACTIVITY TOLERANCE: Primary | ICD-10-CM

## 2023-06-01 DIAGNOSIS — R53.1 DECREASED STRENGTH: ICD-10-CM

## 2023-06-01 PROCEDURE — 97112 NEUROMUSCULAR REEDUCATION: CPT | Mod: PN

## 2023-06-01 PROCEDURE — 97110 THERAPEUTIC EXERCISES: CPT | Mod: PN

## 2023-06-01 NOTE — PROGRESS NOTES
Physical Therapy Daily Treatment Note     Name: Tim Somers Lueders  Clinic Number: [id number]    Therapy Diagnosis:   Encounter Diagnoses   Name Primary?    Decreased functional activity tolerance Yes    Decreased strength        Physician: Antonio Hadley Jr*    Visit Date: 6/1/2023    Physician Orders: PT Eval and Treat   Medical Diagnosis from Referral:   R53.81 (ICD-10-CM) - Physical debility   R26.89 (ICD-10-CM) - Poor balance      Evaluation Date: 5/3/2023  Authorization Period Expiration: 12/31/23  Plan of Care Expiration: 6/30/2023  Visit # / Visits authorized: 6/ 12     PN due: 6/3/23    Time In: 1345  Time Out: 14:40  Total Billable Time: 55 minutes    Precautions: Standard and LVAD    Subjective     Pt reports: Had LVAD appt yesterday and it went well.   Did a lot of walking around main campus.   He was compliant with home exercise program.  Response to previous treatment: tired the next day  Functional change: gained 5 lbs since eval    Pain: not indicated  Location:  not applicable      Objective     Tim received therapeutic exercises to develop strength, endurance, and flexibility for 30 minutes including:    Parallel bar:   - heel raises 2 x 10  - hip abduction  2 x 10  - hip extension  2 x 10  - hip flexion 2 x 10     Recumbent stepper bilateral upper extremity/ lower extremity L4 x 5 mins x 2, for cardiovascular endurance and strength  - 1 mins provided for recovery/ SOB    Supine   hamstring stretch 2 x 30 sec bilateral   Figure 4 stretch 2 x 30 sec bilateral       Neuro Re-ed for balance, posture, proprioception:  for 25 minutes   Standing at free motion:   Rowing 10# 3 x 10  Upper extremity chopping 10 lb 2 x 10 each side     Supine:  Bridge x 10   Bridge with ball squeeze x 10   Bridge with leg lift x 5 each side   Upper extremity add with ball squeeze 3 sec hold x 10   Abdominal brace with lower extremity march x 10 each side  Hooklying lumbar rotation x 10     10 x 2 Sit to stand from  "18" black mat, no upper extremity assist, seated rest break in between     Supine to/from sit mod Independent with increased time    Home Exercises Provided and Patient Education Provided     Education provided:   - continue with previous home exercise program     Written Home Exercises Provided: Patient instructed to cont prior HEP.   Exercises were reviewed and Tim was able to demonstrate them prior to the end of the session.  Tim demonstrated good  understanding of the education provided.     See EMR under Patient Instructions for exercises provided prior visit.    Assessment     Tim tolerated physical therapy session well with minimal fatigue noted.  He had 1 episode of lightheadedness performing chopping exercises today, so transitioned to supine mat exercises.  He was able to progress resistance on Scifit today maintaining same time period.  Continues to require multiple breaks secondary to fatigue.  He continues to make good progress in Physical Therapy.     Tim Is progressing well towards his goals.   Pt prognosis is Good.     Pt will continue to benefit from skilled outpatient physical therapy to address the deficits listed in the problem list box on initial evaluation, provide pt/family education and to maximize pt's level of independence in the home and community environment.     Pt's spiritual, cultural and educational needs considered and pt agreeable to plan of care and goals.     Anticipated barriers to physical therapy: longstanding history of cardiac diagnoses    Goals:   Short Term Goals: 5 weeks   Patient will report performance of home exercise program for strengthening 3x/ week to improve fitness and activity tolerance. ongoing  Patient will demonstrate improved confidence in mobility by stating he feel 70% confident in balance while walking across a parking lot. ongoing  Patient will tolerate 10 consecutive minutes on cardiovascular equipment at low resistance to demonstrate " improved activity tolerance. ongoing  Patient will demonstrate a gross improvement in lower extremity strength by performing 5 times sit<>stand test in 20 sec.  ongoing     Long Term Goals: 10 weeks   Patient will demonstrate improved confidence in mobility by stating he feel 100% confident in balance while walking across a parking lot. ongoing  Patient will tolerate 10 consecutive minutes on cardiovascular equipment at moderate resistance to demonstrate improved activity tolerance. ongoing  Patient will demonstrate a gross improvement in lower extremity strength and decreased fall risk by performing 5 times sit<>stand test in 15 sec.  ongoing  Patient will demonstrate a significant change in gait tolerance for walking to his mailbox by ambulating 413 ft in 2 minutes.  ongoing     Plan     Outpatient Physical Therapy 2 times weekly to include the following interventions: Moist Heat/ Ice, Neuromuscular Re-ed, Patient Education, Therapeutic Activities, and Therapeutic Exercise.     Progress to circuit training as tolerated.        Mariana Holguin, PT, DPT,   Board-Certified Clinical Specialist in Neurologic Physical Therapy   6/1/2023

## 2023-06-06 ENCOUNTER — LAB VISIT (OUTPATIENT)
Dept: LAB | Facility: HOSPITAL | Age: 58
End: 2023-06-06
Payer: MEDICARE

## 2023-06-06 ENCOUNTER — CLINICAL SUPPORT (OUTPATIENT)
Dept: REHABILITATION | Facility: HOSPITAL | Age: 58
End: 2023-06-06
Attending: INTERNAL MEDICINE
Payer: MEDICARE

## 2023-06-06 ENCOUNTER — ANTI-COAG VISIT (OUTPATIENT)
Dept: CARDIOLOGY | Facility: CLINIC | Age: 58
End: 2023-06-06
Payer: MEDICARE

## 2023-06-06 DIAGNOSIS — R53.1 DECREASED STRENGTH: ICD-10-CM

## 2023-06-06 DIAGNOSIS — E03.2 HYPOTHYROIDISM DUE TO AMIODARONE: ICD-10-CM

## 2023-06-06 DIAGNOSIS — R68.89 DECREASED FUNCTIONAL ACTIVITY TOLERANCE: Primary | ICD-10-CM

## 2023-06-06 DIAGNOSIS — Z01.818 PRE-TRANSPLANT EVALUATION FOR HEART TRANSPLANT: ICD-10-CM

## 2023-06-06 DIAGNOSIS — T46.2X1A HYPOTHYROIDISM DUE TO AMIODARONE: ICD-10-CM

## 2023-06-06 DIAGNOSIS — Z95.811 HEART REPLACED BY HEART ASSIST DEVICE: ICD-10-CM

## 2023-06-06 DIAGNOSIS — G47.33 OSA (OBSTRUCTIVE SLEEP APNEA): ICD-10-CM

## 2023-06-06 LAB
INR PPP: 2.6 (ref 0.8–1.2)
PROTHROMBIN TIME: 26 SEC (ref 9–12.5)
T4 FREE SERPL-MCNC: 1.09 NG/DL (ref 0.71–1.51)
TSH SERPL DL<=0.005 MIU/L-ACNC: 8.02 UIU/ML (ref 0.4–4)

## 2023-06-06 PROCEDURE — 85610 PROTHROMBIN TIME: CPT | Performed by: INTERNAL MEDICINE

## 2023-06-06 PROCEDURE — 84443 ASSAY THYROID STIM HORMONE: CPT | Performed by: INTERNAL MEDICINE

## 2023-06-06 PROCEDURE — 36415 COLL VENOUS BLD VENIPUNCTURE: CPT | Performed by: INTERNAL MEDICINE

## 2023-06-06 PROCEDURE — 93793 ANTICOAG MGMT PT WARFARIN: CPT | Mod: S$GLB,,,

## 2023-06-06 PROCEDURE — 97112 NEUROMUSCULAR REEDUCATION: CPT | Mod: PN

## 2023-06-06 PROCEDURE — 93793 PR ANTICOAGULANT MGMT FOR PT TAKING WARFARIN: ICD-10-PCS | Mod: S$GLB,,,

## 2023-06-06 PROCEDURE — 97110 THERAPEUTIC EXERCISES: CPT | Mod: PN

## 2023-06-06 PROCEDURE — 84439 ASSAY OF FREE THYROXINE: CPT | Performed by: INTERNAL MEDICINE

## 2023-06-06 NOTE — PROGRESS NOTES
"  Physical Therapy Daily Treatment Note     Name: Tim Somers Richards  Clinic Number: [id number]    Therapy Diagnosis:   No diagnosis found.      Physician: Antonio Hadley Jr*    Visit Date: 6/6/2023    Physician Orders: PT Eval and Treat   Medical Diagnosis from Referral:   R53.81 (ICD-10-CM) - Physical debility   R26.89 (ICD-10-CM) - Poor balance      Evaluation Date: 5/3/2023  Authorization Period Expiration: 12/31/23  Plan of Care Expiration: 6/30/2023  Visit # / Visits authorized: 7/ 12     PN due: 7/3/23    Time In: 1345  Time Out: 14:38  Total Billable Time: 38 minutes    Precautions: Standard and LVAD    Subjective     Pt reports: He is tired today  He was compliant with home exercise program.  Response to previous treatment: felt ok   Functional change: gained 5 lbs since eval    Pain: not indicated  Location:  not applicable      Objective     Tim received therapeutic exercises to develop strength, endurance, and flexibility for 25 minutes including:    Parallel bar:   - heel raises 2 x 10  - hip abduction  2 x 10  - hip extension  2 x 10  - hip flexion 2 x 10     Recumbent stepper bilateral upper extremity/ lower extremity L4 x 5 mins x 2, for cardiovascular endurance and strength  - 1 mins provided for recovery/ SOB      Neuro Re-ed for balance, posture, proprioception:  for 28 minutes   Standing at free motion:   Rowing 10# 3 x 10  Upper extremity chopping 10 lb 2 x 10 each side     X 10 step up onto 4 in step with B upper extremity assist, bilateral lower extremity each     On foam pad:  X 30 sec feet together stabilization  2 X 30 sec feet together with head turns   2 X 30 sec feet together eyes closed   X 10 forward lunge onto foam, each lower extremity, 1 upper extremity assist     10 x 2 Sit to stand from 21" black mat, no upper extremity assist, seated rest break in between       Home Exercises Provided and Patient Education Provided     Education provided:   - continue with previous home " exercise program     Written Home Exercises Provided: Patient instructed to cont prior HEP.   Exercises were reviewed and Tim was able to demonstrate them prior to the end of the session.  Tim demonstrated good  understanding of the education provided.     See EMR under Patient Instructions for exercises provided prior visit.    Assessment     Tim tolerated physical therapy session well with some fatigue noted.  He has some dizziness with head turns when standing on foam, but no loss of balance. Continued to provided seated and standing rest breaks as necessary.  He continues to benefit from skilled Physical Therapy to address impairments and maximize mobility.     Tim Is progressing well towards his goals.   Pt prognosis is Good.     Pt will continue to benefit from skilled outpatient physical therapy to address the deficits listed in the problem list box on initial evaluation, provide pt/family education and to maximize pt's level of independence in the home and community environment.     Pt's spiritual, cultural and educational needs considered and pt agreeable to plan of care and goals.     Anticipated barriers to physical therapy: longstanding history of cardiac diagnoses    Goals:   Short Term Goals: 5 weeks   Patient will report performance of home exercise program for strengthening 3x/ week to improve fitness and activity tolerance. ongoing  Patient will demonstrate improved confidence in mobility by stating he feel 70% confident in balance while walking across a parking lot. ongoing  Patient will tolerate 10 consecutive minutes on cardiovascular equipment at low resistance to demonstrate improved activity tolerance. ongoing  Patient will demonstrate a gross improvement in lower extremity strength by performing 5 times sit<>stand test in 20 sec.  ongoing     Long Term Goals: 10 weeks   Patient will demonstrate improved confidence in mobility by stating he feel 100% confident in balance while  walking across a parking lot. ongoing  Patient will tolerate 10 consecutive minutes on cardiovascular equipment at moderate resistance to demonstrate improved activity tolerance. ongoing  Patient will demonstrate a gross improvement in lower extremity strength and decreased fall risk by performing 5 times sit<>stand test in 15 sec.  ongoing  Patient will demonstrate a significant change in gait tolerance for walking to his mailbox by ambulating 413 ft in 2 minutes.  ongoing     Plan     Outpatient Physical Therapy 2 times weekly to include the following interventions: Moist Heat/ Ice, Neuromuscular Re-ed, Patient Education, Therapeutic Activities, and Therapeutic Exercise.     Progress to circuit training as tolerated.        Mariana Holguin, PT, DPT,   Board-Certified Clinical Specialist in Neurologic Physical Therapy   6/6/2023

## 2023-06-07 ENCOUNTER — PATIENT MESSAGE (OUTPATIENT)
Dept: ENDOCRINOLOGY | Facility: CLINIC | Age: 58
End: 2023-06-07
Payer: MEDICARE

## 2023-06-07 DIAGNOSIS — T46.2X1A HYPOTHYROIDISM DUE TO AMIODARONE: Primary | ICD-10-CM

## 2023-06-07 DIAGNOSIS — E03.2 HYPOTHYROIDISM DUE TO AMIODARONE: Primary | ICD-10-CM

## 2023-06-07 RX ORDER — LEVOTHYROXINE SODIUM 100 UG/1
100 TABLET ORAL
Qty: 30 TABLET | Refills: 11 | Status: SHIPPED | OUTPATIENT
Start: 2023-06-07 | End: 2023-07-25

## 2023-06-07 NOTE — PROGRESS NOTES
Heart transplant team messaged regarding recent TFTs with TSH above ULN and normal fT4. He is currently on LT4 88 mcg. We will increase the dose of LT4 to 100 mcg and recheck TSH in 6-8wks.    Staff - please contact pt to schedule lab. If he has other labs around the same time you can add it on to another lab appt. Please let pt know to check [facility] messages and that I sent a new prescription to his pharmacy.    Thanks!  María Brice

## 2023-06-08 ENCOUNTER — PATIENT MESSAGE (OUTPATIENT)
Dept: CARDIOTHORACIC SURGERY | Facility: CLINIC | Age: 58
End: 2023-06-08
Payer: MEDICARE

## 2023-06-08 NOTE — PROGRESS NOTES
Kelly stated patient took Warfarin 5mg on 5/31/23 and took 7.5mg on 6/2/23, took correct dose all other days, no other changes reported.

## 2023-06-12 NOTE — PROGRESS NOTES
"  Physical Therapy Daily Treatment Note     Name: Tim Somers [place]  Clinic Number: [id number]    Therapy Diagnosis:   Encounter Diagnoses   Name Primary?    Decreased functional activity tolerance Yes    Decreased strength        Physician: Antonio Hadley Jr*    Visit Date: 6/13/2023    Physician Orders: PT Eval and Treat   Medical Diagnosis from Referral:   R53.81 (ICD-10-CM) - Physical debility   R26.89 (ICD-10-CM) - Poor balance      Evaluation Date: 5/3/2023  Authorization Period Expiration: 12/31/23  Plan of Care Expiration: 6/30/2023  Visit # / Visits authorized: 8/ 12     PN due: 7/3/23    Time In: 1345  Time Out: 1432  Total Billable Time: 47 minutes    Precautions: Standard and LVAD    Subjective     Pt reports: no new reports, a little tired today  He was compliant with home exercise program.  Response to previous treatment: felt ok   Functional change: ongoing    Pain: not indicated  Location:  not applicable      Objective     Tim received therapeutic exercises to develop strength, endurance, and flexibility for 47 minutes including:    Parallel bar:   - heel raises 2# 2 x 10  - hip abduction 2# 2 x 10  - hip extension 2# 2 x 10  - step tap onto 8 in step     Recumbent stepper bilateral upper extremity/ lower extremity L4 x 5 mins, for cardiovascular endurance and strength    Standing at free motion:   Rowing 10# 3 x 10  Upper extremity chopping (high to low) 10 lb 2 x 10 each side     Sit<>stands from 18" box + foam 2 x 8    UBE L1 x 5 mins    Sitting:   HSS 2 x 30 sec      Home Exercises Provided and Patient Education Provided     Education provided:   - continue with previous home exercise program     Written Home Exercises Provided: Patient instructed to cont prior HEP.   Exercises were reviewed and Tim was able to demonstrate them prior to the end of the session.  Tim demonstrated good  understanding of the education provided.     See EMR under Patient Instructions for exercises " provided prior visit.    Assessment     Tim tolerated physical therapy session well with rest breaks provided to address fatigue. Patient reports that he feels strong but becomes dizziness during physical therapy session requiring the rest breaks. shortness of breath noted during exercises and improved with resting. Circuit training initiated today with a good tolerance noted. Patient can benefit from continued skilled physical therapy to maximize mobility.     Tim Is progressing well towards his goals.   Pt prognosis is Good.     Pt will continue to benefit from skilled outpatient physical therapy to address the deficits listed in the problem list box on initial evaluation, provide pt/family education and to maximize pt's level of independence in the home and community environment.     Pt's spiritual, cultural and educational needs considered and pt agreeable to plan of care and goals.     Anticipated barriers to physical therapy: longstanding history of cardiac diagnoses    Goals:   Status as of 5/30/23  Short Term Goals: 5 weeks   Patient will report performance of home exercise program for strengthening 3x/ week to improve fitness and activity tolerance. Met 5/30/23  Patient will demonstrate improved confidence in mobility by stating he feels 70% confident in balance while walking across a parking lot. ongoing  Patient will tolerate 10 consecutive minutes on cardiovascular equipment at low resistance to demonstrate improved activity tolerance. ongoing  Patient will demonstrate a gross improvement in lower extremity strength by performing 5 times sit<>stand test in 20 sec.  ongoing     Long Term Goals: 10 weeks   Patient will demonstrate improved confidence in mobility by stating he feel 100% confident in balance while walking across a parking lot. ongoing  Patient will tolerate 10 consecutive minutes on cardiovascular equipment at moderate resistance to demonstrate improved activity tolerance.  ongoing  Patient will demonstrate a gross improvement in lower extremity strength and decreased fall risk by performing 5 times sit<>stand test in 15 sec.  ongoing  Patient will demonstrate a significant change in gait tolerance for walking to his mailbox by ambulating 413 ft in 2 minutes.  ongoing     Plan     Outpatient Physical Therapy 2 times weekly to include the following interventions: Moist Heat/ Ice, Neuromuscular Re-ed, Patient Education, Therapeutic Activities, and Therapeutic Exercise.     Continue with circuit training. Progress to lower extremity weight machines       Emma Curtis, PT, DPT,   Board-Certified Clinical Specialist in Neurologic Physical Therapy   6/13/2023

## 2023-06-13 ENCOUNTER — LAB VISIT (OUTPATIENT)
Dept: LAB | Facility: HOSPITAL | Age: 58
End: 2023-06-13
Attending: INTERNAL MEDICINE
Payer: MEDICARE

## 2023-06-13 ENCOUNTER — ANTI-COAG VISIT (OUTPATIENT)
Dept: CARDIOLOGY | Facility: CLINIC | Age: 58
End: 2023-06-13
Payer: MEDICARE

## 2023-06-13 ENCOUNTER — CLINICAL SUPPORT (OUTPATIENT)
Dept: REHABILITATION | Facility: HOSPITAL | Age: 58
End: 2023-06-13
Attending: INTERNAL MEDICINE
Payer: MEDICARE

## 2023-06-13 DIAGNOSIS — Z79.01 LONG TERM (CURRENT) USE OF ANTICOAGULANTS: ICD-10-CM

## 2023-06-13 DIAGNOSIS — R53.1 DECREASED STRENGTH: ICD-10-CM

## 2023-06-13 DIAGNOSIS — R68.89 DECREASED FUNCTIONAL ACTIVITY TOLERANCE: Primary | ICD-10-CM

## 2023-06-13 DIAGNOSIS — Z95.811 LVAD (LEFT VENTRICULAR ASSIST DEVICE) PRESENT: ICD-10-CM

## 2023-06-13 LAB
INR PPP: 2.2 (ref 0.8–1.2)
PROTHROMBIN TIME: 21.7 SEC (ref 9–12.5)

## 2023-06-13 PROCEDURE — 97110 THERAPEUTIC EXERCISES: CPT | Mod: PN | Performed by: PHYSICAL THERAPIST

## 2023-06-13 PROCEDURE — 93793 ANTICOAG MGMT PT WARFARIN: CPT | Mod: S$GLB,,,

## 2023-06-13 PROCEDURE — 85610 PROTHROMBIN TIME: CPT | Performed by: INTERNAL MEDICINE

## 2023-06-13 PROCEDURE — 36415 COLL VENOUS BLD VENIPUNCTURE: CPT | Performed by: INTERNAL MEDICINE

## 2023-06-13 PROCEDURE — 93793 PR ANTICOAGULANT MGMT FOR PT TAKING WARFARIN: ICD-10-PCS | Mod: S$GLB,,,

## 2023-06-13 NOTE — PROGRESS NOTES
Kelly stated patient's Levothyroxine was increased to 100mcg on 6/8/23, no other changes reported.

## 2023-06-16 ENCOUNTER — CLINICAL SUPPORT (OUTPATIENT)
Dept: REHABILITATION | Facility: HOSPITAL | Age: 58
End: 2023-06-16
Attending: INTERNAL MEDICINE
Payer: MEDICARE

## 2023-06-16 DIAGNOSIS — R68.89 DECREASED FUNCTIONAL ACTIVITY TOLERANCE: Primary | ICD-10-CM

## 2023-06-16 DIAGNOSIS — R53.1 DECREASED STRENGTH: ICD-10-CM

## 2023-06-16 PROCEDURE — 97110 THERAPEUTIC EXERCISES: CPT | Mod: PN | Performed by: PHYSICAL THERAPIST

## 2023-06-16 NOTE — PROGRESS NOTES
"  Physical Therapy Daily Treatment Note     Name: Tim Richards  Clinic Number: [id number]    Therapy Diagnosis:   Encounter Diagnoses   Name Primary?    Decreased functional activity tolerance Yes    Decreased strength        Physician: Antonio Hadley Jr*    Visit Date: 6/16/2023    Physician Orders: PT Eval and Treat   Medical Diagnosis from Referral:   R53.81 (ICD-10-CM) - Physical debility   R26.89 (ICD-10-CM) - Poor balance      Evaluation Date: 5/3/2023  Authorization Period Expiration: 12/31/23  Plan of Care Expiration: 6/30/2023  Visit # / Visits authorized: 9/ 12     PN due: 7/3/23    Time In: 1348  Time Out: 1440  Total Billable Time: 15 minutes  Total treatment time: 52  minutes      Precautions: Standard and LVAD    Subjective     Pt reports: a little tired today, feels like my defibrillator wants to go off  He was compliant with home exercise program.  Response to previous treatment: felt ok   Functional change: ongoing    Pain: not indicated  Location:  not applicable      Objective     Tim received therapeutic exercises to develop strength, endurance, and flexibility for 52 minutes including:    - step tap onto 8 in step     Recumbent stepper bilateral upper extremity/ lower extremity L4 x 5 mins, for cardiovascular endurance and strength    Standing at free motion:   Rowing 10# 3 x 10  Upper extremity chopping (high to low) 10 lb 2 x 10 each side     Sit<>stands from 18" box + foam 2 x 8    UBE L1 x 5 mins    Matrix:   Hip abduction 45# 3 x 8  Knee extension 25# 3 x 8     Sitting:   HSS 2 x 30 sec      Home Exercises Provided and Patient Education Provided     Education provided:   - continue with previous home exercise program     Written Home Exercises Provided: Patient instructed to cont prior HEP.   Exercises were reviewed and Tim was able to demonstrate them prior to the end of the session.  Tim demonstrated good  understanding of the education provided.     See EMR under " Patient Instructions for exercises provided prior visit.    Assessment     Tim tolerated physical therapy session well with rest breaks provided to address fatigue. Lower extremity resistance machines added today. Patient tolerated well with short rest breaks between sets.  Patient can benefit from continued skilled physical therapy to maximize mobility.     Tim Is progressing well towards his goals.   Pt prognosis is Good.     Pt will continue to benefit from skilled outpatient physical therapy to address the deficits listed in the problem list box on initial evaluation, provide pt/family education and to maximize pt's level of independence in the home and community environment.     Pt's spiritual, cultural and educational needs considered and pt agreeable to plan of care and goals.     Anticipated barriers to physical therapy: longstanding history of cardiac diagnoses    Goals:   Status as of 5/30/23  Short Term Goals: 5 weeks   Patient will report performance of home exercise program for strengthening 3x/ week to improve fitness and activity tolerance. Met 5/30/23  Patient will demonstrate improved confidence in mobility by stating he feels 70% confident in balance while walking across a parking lot. ongoing  Patient will tolerate 10 consecutive minutes on cardiovascular equipment at low resistance to demonstrate improved activity tolerance. ongoing  Patient will demonstrate a gross improvement in lower extremity strength by performing 5 times sit<>stand test in 20 sec.  ongoing     Long Term Goals: 10 weeks   Patient will demonstrate improved confidence in mobility by stating he feel 100% confident in balance while walking across a parking lot. ongoing  Patient will tolerate 10 consecutive minutes on cardiovascular equipment at moderate resistance to demonstrate improved activity tolerance. ongoing  Patient will demonstrate a gross improvement in lower extremity strength and decreased fall risk by  performing 5 times sit<>stand test in 15 sec.  ongoing  Patient will demonstrate a significant change in gait tolerance for walking to his mailbox by ambulating 413 ft in 2 minutes.  ongoing     Plan     Outpatient Physical Therapy 2 times weekly to include the following interventions: Moist Heat/ Ice, Neuromuscular Re-ed, Patient Education, Therapeutic Activities, and Therapeutic Exercise.     Continue with circuit training. Progress weight machines as tolerated       Emma Curtis, PT, DPT,   Board-Certified Clinical Specialist in Neurologic Physical Therapy   6/16/2023

## 2023-06-19 ENCOUNTER — PATIENT MESSAGE (OUTPATIENT)
Dept: TRANSPLANT | Facility: CLINIC | Age: 58
End: 2023-06-19
Payer: MEDICARE

## 2023-06-20 ENCOUNTER — CLINICAL SUPPORT (OUTPATIENT)
Dept: REHABILITATION | Facility: HOSPITAL | Age: 58
End: 2023-06-20
Attending: INTERNAL MEDICINE
Payer: MEDICARE

## 2023-06-20 ENCOUNTER — LAB VISIT (OUTPATIENT)
Dept: LAB | Facility: HOSPITAL | Age: 58
End: 2023-06-20
Attending: INTERNAL MEDICINE
Payer: MEDICARE

## 2023-06-20 ENCOUNTER — ANTI-COAG VISIT (OUTPATIENT)
Dept: CARDIOLOGY | Facility: CLINIC | Age: 58
End: 2023-06-20
Payer: MEDICARE

## 2023-06-20 DIAGNOSIS — R68.89 DECREASED FUNCTIONAL ACTIVITY TOLERANCE: Primary | ICD-10-CM

## 2023-06-20 DIAGNOSIS — R53.1 DECREASED STRENGTH: ICD-10-CM

## 2023-06-20 DIAGNOSIS — Z95.811 LVAD (LEFT VENTRICULAR ASSIST DEVICE) PRESENT: Chronic | ICD-10-CM

## 2023-06-20 DIAGNOSIS — Z79.01 LONG TERM (CURRENT) USE OF ANTICOAGULANTS: ICD-10-CM

## 2023-06-20 LAB
ALBUMIN SERPL BCP-MCNC: 3.9 G/DL (ref 3.5–5.2)
ALP SERPL-CCNC: 97 U/L (ref 55–135)
ALT SERPL W/O P-5'-P-CCNC: 28 U/L (ref 10–44)
ANION GAP SERPL CALC-SCNC: 10 MMOL/L (ref 8–16)
AST SERPL-CCNC: 33 U/L (ref 10–40)
BILIRUB SERPL-MCNC: 0.4 MG/DL (ref 0.1–1)
BUN SERPL-MCNC: 19 MG/DL (ref 6–20)
CALCIUM SERPL-MCNC: 9.5 MG/DL (ref 8.7–10.5)
CHLORIDE SERPL-SCNC: 99 MMOL/L (ref 95–110)
CO2 SERPL-SCNC: 30 MMOL/L (ref 23–29)
CREAT SERPL-MCNC: 2.5 MG/DL (ref 0.5–1.4)
EST. GFR  (NO RACE VARIABLE): 29 ML/MIN/1.73 M^2
GLUCOSE SERPL-MCNC: 82 MG/DL (ref 70–110)
INR PPP: 2.4 (ref 0.8–1.2)
POTASSIUM SERPL-SCNC: 4.4 MMOL/L (ref 3.5–5.1)
PROT SERPL-MCNC: 8.7 G/DL (ref 6–8.4)
PROTHROMBIN TIME: 24 SEC (ref 9–12.5)
SODIUM SERPL-SCNC: 139 MMOL/L (ref 136–145)

## 2023-06-20 PROCEDURE — 85610 PROTHROMBIN TIME: CPT | Performed by: INTERNAL MEDICINE

## 2023-06-20 PROCEDURE — 80053 COMPREHEN METABOLIC PANEL: CPT | Performed by: INTERNAL MEDICINE

## 2023-06-20 PROCEDURE — 97110 THERAPEUTIC EXERCISES: CPT | Mod: PN | Performed by: PHYSICAL THERAPIST

## 2023-06-20 PROCEDURE — 93793 ANTICOAG MGMT PT WARFARIN: CPT | Mod: S$GLB,,,

## 2023-06-20 PROCEDURE — 93793 PR ANTICOAGULANT MGMT FOR PT TAKING WARFARIN: ICD-10-PCS | Mod: S$GLB,,,

## 2023-06-20 NOTE — PROGRESS NOTES
"  Physical Therapy Daily Treatment Note     Name: Tim Somers [place]  Clinic Number: [id number]    Therapy Diagnosis:   Encounter Diagnoses   Name Primary?    Decreased functional activity tolerance Yes    Decreased strength          Physician: Antonio Hadley Jr*    Visit Date: 6/20/2023    Physician Orders: PT Eval and Treat   Medical Diagnosis from Referral:   R53.81 (ICD-10-CM) - Physical debility   R26.89 (ICD-10-CM) - Poor balance      Evaluation Date: 5/3/2023  Authorization Period Expiration: 12/31/23  Plan of Care Expiration: 6/30/2023  Visit # / Visits authorized: 10/ 12     PN due: 7/3/23    Time In: 1345  Time Out: 1440  Total Billable Time: 55 minutes    Precautions: Standard and LVAD    Subjective     Pt reports: no new reports.   He was compliant with home exercise program.  Response to previous treatment: inner thigh and shoulder soreness for a few days  Functional change: ongoing    Pain: not indicated  Location:  not applicable      Objective     Tim received therapeutic exercises to develop strength, endurance, and flexibility for 55 minutes including:    UBE L1 x 5 mins    Sit<>stands from 18" box + foam 2 x 8    step tap onto 8 in step 2 x 10    Standing at free motion:   Rowing 10# 3 x 10  Upper extremity chopping (high to low) 10 lb 2 x 8 each side     Recumbent stepper bilateral upper extremity/ lower extremity L4 x 5 mins, for cardiovascular endurance and strength    Matrix:   Hip abduction 25# 3 x 8  Knee extension 15# 3 x 8     Sitting:   HSS 2 x 30 sec      Home Exercises Provided and Patient Education Provided     Education provided:   - continue with previous home exercise program  - instructed on the correct breathing techniques to prevent Valsalva maneuver      Written Home Exercises Provided: Patient instructed to cont prior HEP.   Exercises were reviewed and Tim was able to demonstrate them prior to the end of the session.  Tim demonstrated good  understanding of the " education provided.     See EMR under Patient Instructions for exercises provided prior visit.    Assessment     Tim tolerated physical therapy session well with rest breaks provided to address fatigue. Repetitions and sets decreased due to patient's report of significant shoulder soreness after last session. Resistance for lower extremity exercises also decreased to accommodate for visible muscular fatigue.  Patient can benefit from continued skilled physical therapy to maximize mobility.     Tim Is progressing well towards his goals.   Pt prognosis is Good.     Pt will continue to benefit from skilled outpatient physical therapy to address the deficits listed in the problem list box on initial evaluation, provide pt/family education and to maximize pt's level of independence in the home and community environment.     Pt's spiritual, cultural and educational needs considered and pt agreeable to plan of care and goals.     Anticipated barriers to physical therapy: longstanding history of cardiac diagnoses    Goals:   Status as of 5/30/23  Short Term Goals: 5 weeks   Patient will report performance of home exercise program for strengthening 3x/ week to improve fitness and activity tolerance. Met 5/30/23  Patient will demonstrate improved confidence in mobility by stating he feels 70% confident in balance while walking across a parking lot. ongoing  Patient will tolerate 10 consecutive minutes on cardiovascular equipment at low resistance to demonstrate improved activity tolerance. ongoing  Patient will demonstrate a gross improvement in lower extremity strength by performing 5 times sit<>stand test in 20 sec.  ongoing     Long Term Goals: 10 weeks   Patient will demonstrate improved confidence in mobility by stating he feel 100% confident in balance while walking across a parking lot. ongoing  Patient will tolerate 10 consecutive minutes on cardiovascular equipment at moderate resistance to demonstrate improved  activity tolerance. ongoing  Patient will demonstrate a gross improvement in lower extremity strength and decreased fall risk by performing 5 times sit<>stand test in 15 sec.  ongoing  Patient will demonstrate a significant change in gait tolerance for walking to his mailbox by ambulating 413 ft in 2 minutes.  ongoing     Plan     Outpatient Physical Therapy 2 times weekly to include the following interventions: Moist Heat/ Ice, Neuromuscular Re-ed, Patient Education, Therapeutic Activities, and Therapeutic Exercise.     Continue with circuit training. Maintain current weights or may decrease to patient tolerance       Emma Curtis, PT, DPT,   Board-Certified Clinical Specialist in Neurologic Physical Therapy   6/20/2023

## 2023-06-21 ENCOUNTER — TELEPHONE (OUTPATIENT)
Dept: TRANSPLANT | Facility: CLINIC | Age: 58
End: 2023-06-21
Payer: MEDICARE

## 2023-06-27 ENCOUNTER — TELEPHONE (OUTPATIENT)
Dept: TRANSPLANT | Facility: CLINIC | Age: 58
End: 2023-06-27
Payer: MEDICARE

## 2023-06-27 ENCOUNTER — CLINICAL SUPPORT (OUTPATIENT)
Dept: REHABILITATION | Facility: HOSPITAL | Age: 58
End: 2023-06-27
Attending: INTERNAL MEDICINE
Payer: MEDICARE

## 2023-06-27 ENCOUNTER — ANTI-COAG VISIT (OUTPATIENT)
Dept: CARDIOLOGY | Facility: CLINIC | Age: 58
End: 2023-06-27
Payer: MEDICARE

## 2023-06-27 ENCOUNTER — LAB VISIT (OUTPATIENT)
Dept: LAB | Facility: HOSPITAL | Age: 58
End: 2023-06-27
Attending: INTERNAL MEDICINE
Payer: MEDICARE

## 2023-06-27 DIAGNOSIS — Z79.01 LONG TERM (CURRENT) USE OF ANTICOAGULANTS: ICD-10-CM

## 2023-06-27 DIAGNOSIS — Z95.811 LVAD (LEFT VENTRICULAR ASSIST DEVICE) PRESENT: Chronic | ICD-10-CM

## 2023-06-27 DIAGNOSIS — R68.89 DECREASED FUNCTIONAL ACTIVITY TOLERANCE: Primary | ICD-10-CM

## 2023-06-27 DIAGNOSIS — R53.1 DECREASED STRENGTH: ICD-10-CM

## 2023-06-27 LAB
ALBUMIN SERPL BCP-MCNC: 3.8 G/DL (ref 3.5–5.2)
ALP SERPL-CCNC: 104 U/L (ref 55–135)
ALT SERPL W/O P-5'-P-CCNC: 22 U/L (ref 10–44)
ANION GAP SERPL CALC-SCNC: 10 MMOL/L (ref 8–16)
AST SERPL-CCNC: 35 U/L (ref 10–40)
BILIRUB SERPL-MCNC: 0.3 MG/DL (ref 0.1–1)
BNP SERPL-MCNC: 495 PG/ML (ref 0–99)
BUN SERPL-MCNC: 21 MG/DL (ref 6–20)
CALCIUM SERPL-MCNC: 9 MG/DL (ref 8.7–10.5)
CHLORIDE SERPL-SCNC: 105 MMOL/L (ref 95–110)
CO2 SERPL-SCNC: 23 MMOL/L (ref 23–29)
CREAT SERPL-MCNC: 2.1 MG/DL (ref 0.5–1.4)
EST. GFR  (NO RACE VARIABLE): 36 ML/MIN/1.73 M^2
GLUCOSE SERPL-MCNC: 100 MG/DL (ref 70–110)
INR PPP: 3.9 (ref 0.8–1.2)
POTASSIUM SERPL-SCNC: 4.5 MMOL/L (ref 3.5–5.1)
PROT SERPL-MCNC: 8.2 G/DL (ref 6–8.4)
PROTHROMBIN TIME: 37.7 SEC (ref 9–12.5)
SODIUM SERPL-SCNC: 138 MMOL/L (ref 136–145)

## 2023-06-27 PROCEDURE — 83880 ASSAY OF NATRIURETIC PEPTIDE: CPT | Performed by: INTERNAL MEDICINE

## 2023-06-27 PROCEDURE — 36415 COLL VENOUS BLD VENIPUNCTURE: CPT | Performed by: INTERNAL MEDICINE

## 2023-06-27 PROCEDURE — 80053 COMPREHEN METABOLIC PANEL: CPT | Performed by: INTERNAL MEDICINE

## 2023-06-27 PROCEDURE — 85610 PROTHROMBIN TIME: CPT | Performed by: INTERNAL MEDICINE

## 2023-06-27 PROCEDURE — 93793 ANTICOAG MGMT PT WARFARIN: CPT | Mod: S$GLB,,,

## 2023-06-27 PROCEDURE — 97110 THERAPEUTIC EXERCISES: CPT | Mod: PN | Performed by: PHYSICAL THERAPIST

## 2023-06-27 PROCEDURE — 93793 PR ANTICOAGULANT MGMT FOR PT TAKING WARFARIN: ICD-10-PCS | Mod: S$GLB,,,

## 2023-06-27 NOTE — PROGRESS NOTES
Physical Therapy Daily Treatment Note     Name: Tim Somers [place]  Clinic Number: [id number]    Therapy Diagnosis:   Encounter Diagnoses   Name Primary?    Decreased functional activity tolerance Yes    Decreased strength        Physician: Antonio Hadley Jr*    Visit Date: 6/27/2023    Physician Orders: PT Eval and Treat   Medical Diagnosis from Referral:   R53.81 (ICD-10-CM) - Physical debility   R26.89 (ICD-10-CM) - Poor balance      Evaluation Date: 5/3/2023  Authorization Period Expiration: 12/31/23  Plan of Care Expiration: 6/30/2023  Visit # / Visits authorized: 11/ 12     PN due: 7/3/23    Time In: 1345  Time Out: 1430  Total Billable Time: 45 minutes    Precautions: Standard and LVAD    Subjective     Pt reports: had a fall due to tripping over the coffee table. Hit right hip, right elbow  He was compliant with home exercise program.  Response to previous treatment: a little sore  Functional change: ongoing    Pain: 7-8/10   Location:  right hip     Objective     Tim received therapeutic exercises to develop strength, endurance, and flexibility for 45 minutes including:    Recumbent stepper bilateral upper extremity/ lower extremity L4 x 5 mins, for cardiovascular endurance and strength    Standing at free motion:   Lat pull down 13# 2 x 10  Rowing 10# 3 x 10  Upper extremity chopping (high to low) 10 lb 2 x 8 each side     step tap onto 8 in step 2 x 10    UBE L1 x 5 mins    Matrix:   Hip abduction 25# 3 x 8  Knee extension 15# 3 x 8     Sitting:   HSS 2 x 30 sec      Home Exercises Provided and Patient Education Provided     Education provided:   - continue with previous home exercise program       Written Home Exercises Provided: Patient instructed to cont prior HEP.   Exercises were reviewed and Tim was able to demonstrate them prior to the end of the session.  Tim demonstrated good  understanding of the education provided.     See EMR under Patient Instructions for exercises provided  prior visit.    Assessment     Tim tolerated physical therapy session well with rest breaks provided to address fatigue. Sit<>stands held today due to hip pain. He was able to complete all other exercises without a significant increase in pain. 1 sitting rest break was required during standing exercises.  Patient can benefit from continued skilled physical therapy to improve activity tolerance for daily mobility/ tasks.     Tim Is progressing well towards his goals.   Pt prognosis is Good.     Pt will continue to benefit from skilled outpatient physical therapy to address the deficits listed in the problem list box on initial evaluation, provide pt/family education and to maximize pt's level of independence in the home and community environment.     Pt's spiritual, cultural and educational needs considered and pt agreeable to plan of care and goals.     Anticipated barriers to physical therapy: longstanding history of cardiac diagnoses    Goals:   Status as of 5/30/23  Short Term Goals: 5 weeks   Patient will report performance of home exercise program for strengthening 3x/ week to improve fitness and activity tolerance. Met 5/30/23  Patient will demonstrate improved confidence in mobility by stating he feels 70% confident in balance while walking across a parking lot. ongoing  Patient will tolerate 10 consecutive minutes on cardiovascular equipment at low resistance to demonstrate improved activity tolerance. ongoing  Patient will demonstrate a gross improvement in lower extremity strength by performing 5 times sit<>stand test in 20 sec.  ongoing     Long Term Goals: 10 weeks   Patient will demonstrate improved confidence in mobility by stating he feel 100% confident in balance while walking across a parking lot. ongoing  Patient will tolerate 10 consecutive minutes on cardiovascular equipment at moderate resistance to demonstrate improved activity tolerance. ongoing  Patient will demonstrate a gross  improvement in lower extremity strength and decreased fall risk by performing 5 times sit<>stand test in 15 sec.  ongoing  Patient will demonstrate a significant change in gait tolerance for walking to his mailbox by ambulating 413 ft in 2 minutes.  ongoing     Plan     Outpatient Physical Therapy 2 times weekly to include the following interventions: Moist Heat/ Ice, Neuromuscular Re-ed, Patient Education, Therapeutic Activities, and Therapeutic Exercise.     Continue with circuit training. Maintain current weights for strengthening        Emma Curtis, PT, DPT,   Board-Certified Clinical Specialist in Neurologic Physical Therapy   6/27/2023

## 2023-06-27 NOTE — PROGRESS NOTES
Kelly reports correct Warfarin dose, stated patient is scheduled for RHC 7/14/23 with Dr. Santizo and he may reschedule, 2 glasses of wine 6/26/2023, no other changes reported.

## 2023-06-27 NOTE — TELEPHONE ENCOUNTER
Spoke with patient spouse, reviewed lab results. Cr remains elevated. Patient is scheduled for RHC on 7/14, reinforced importance of completing this testing. Verbalized understanding.

## 2023-06-28 ENCOUNTER — TELEPHONE (OUTPATIENT)
Dept: TRANSPLANT | Facility: CLINIC | Age: 58
End: 2023-06-28
Payer: MEDICARE

## 2023-06-28 NOTE — TELEPHONE ENCOUNTER
Called patient to discuss labs, no answer, no VM available.   
all other ROS negative except as per HPI

## 2023-06-29 ENCOUNTER — CLINICAL SUPPORT (OUTPATIENT)
Dept: REHABILITATION | Facility: HOSPITAL | Age: 58
End: 2023-06-29
Attending: INTERNAL MEDICINE
Payer: MEDICARE

## 2023-06-29 ENCOUNTER — LAB VISIT (OUTPATIENT)
Dept: LAB | Facility: HOSPITAL | Age: 58
End: 2023-06-29
Attending: INTERNAL MEDICINE
Payer: MEDICARE

## 2023-06-29 ENCOUNTER — ANTI-COAG VISIT (OUTPATIENT)
Dept: CARDIOLOGY | Facility: CLINIC | Age: 58
End: 2023-06-29
Payer: MEDICARE

## 2023-06-29 DIAGNOSIS — R53.1 DECREASED STRENGTH: ICD-10-CM

## 2023-06-29 DIAGNOSIS — Z95.811 HEART REPLACED BY HEART ASSIST DEVICE: ICD-10-CM

## 2023-06-29 DIAGNOSIS — R68.89 DECREASED FUNCTIONAL ACTIVITY TOLERANCE: Primary | ICD-10-CM

## 2023-06-29 LAB
INR PPP: 3.1 (ref 0.8–1.2)
PROTHROMBIN TIME: 30.6 SEC (ref 9–12.5)

## 2023-06-29 PROCEDURE — 97110 THERAPEUTIC EXERCISES: CPT | Mod: PN

## 2023-06-29 PROCEDURE — 93793 ANTICOAG MGMT PT WARFARIN: CPT | Mod: S$GLB,,,

## 2023-06-29 PROCEDURE — 36415 COLL VENOUS BLD VENIPUNCTURE: CPT | Performed by: INTERNAL MEDICINE

## 2023-06-29 PROCEDURE — 85610 PROTHROMBIN TIME: CPT | Performed by: INTERNAL MEDICINE

## 2023-06-29 PROCEDURE — 93793 PR ANTICOAGULANT MGMT FOR PT TAKING WARFARIN: ICD-10-PCS | Mod: S$GLB,,,

## 2023-06-29 NOTE — PROGRESS NOTES
Physical Therapy Daily Treatment Note     Name: Tim Somers [place]  Clinic Number: [id number]    Therapy Diagnosis:   Encounter Diagnoses   Name Primary?    Decreased functional activity tolerance Yes    Decreased strength        Physician: Antonio Hadley Jr*    Visit Date: 6/29/2023    Physician Orders: PT Eval and Treat   Medical Diagnosis from Referral:   R53.81 (ICD-10-CM) - Physical debility   R26.89 (ICD-10-CM) - Poor balance      Evaluation Date: 5/3/2023  Authorization Period Expiration: 12/31/23  Plan of Care Expiration: 6/30/2023  Visit # / Visits authorized: 12/ 24     PN due: 7/3/23    Time In: 1350  Time Out: 1430  Total Billable Time: 40 minutes    Precautions: Standard and LVAD    Subjective     Pt reports: Still slightly sore from fall, doing ok overall though    He was compliant with home exercise program.  Response to previous treatment: a little sore  Functional change: ongoing    Pain: 7-8/10   Location:  right hip     Objective     Tim received therapeutic exercises to develop strength, endurance, and flexibility for 40 minutes including:    Recumbent stepper bilateral upper extremity/ lower extremity L4 x 5 mins, for cardiovascular endurance and strength    Standing at free motion:   Lat pull down 13# 2 x 10  Rowing 10# 3 x 10  Upper extremity chopping (high to low) 10 lb 2 x 8 each side     step tap onto 8 in step 2 x 10    UBE L1 x 5 mins    Matrix:   Hip abduction 25# 3 x 8  Knee extension 15# 3 x 8     Sitting:   HSS 2 x 30 sec    Home Exercises Provided and Patient Education Provided     Education provided:   - continue with previous home exercise program       Written Home Exercises Provided: Patient instructed to cont prior HEP.   Exercises were reviewed and Tim was able to demonstrate them prior to the end of the session.  Tim demonstrated good  understanding of the education provided.     See EMR under Patient Instructions for exercises provided prior visit.    Assessment      Tim tolerated physical therapy session well with rest breaks provided to address fatigue. Increased rest breaks taken this date secondary to pt reports of dizziness toward end of session, improving with rest. Sit<>stands held today due to hip pain. He was able to complete all other exercises without a significant increase in pain. Patient can benefit from continued skilled physical therapy to improve activity tolerance for daily mobility/ tasks.     Tim Is progressing well towards his goals.   Pt prognosis is Good.     Pt will continue to benefit from skilled outpatient physical therapy to address the deficits listed in the problem list box on initial evaluation, provide pt/family education and to maximize pt's level of independence in the home and community environment.     Pt's spiritual, cultural and educational needs considered and pt agreeable to plan of care and goals.     Anticipated barriers to physical therapy: longstanding history of cardiac diagnoses    Goals:   Status as of 5/30/23  Short Term Goals: 5 weeks   Patient will report performance of home exercise program for strengthening 3x/ week to improve fitness and activity tolerance. Met 5/30/23  Patient will demonstrate improved confidence in mobility by stating he feels 70% confident in balance while walking across a parking lot. ongoing  Patient will tolerate 10 consecutive minutes on cardiovascular equipment at low resistance to demonstrate improved activity tolerance. ongoing  Patient will demonstrate a gross improvement in lower extremity strength by performing 5 times sit<>stand test in 20 sec.  ongoing     Long Term Goals: 10 weeks   Patient will demonstrate improved confidence in mobility by stating he feel 100% confident in balance while walking across a parking lot. ongoing  Patient will tolerate 10 consecutive minutes on cardiovascular equipment at moderate resistance to demonstrate improved activity tolerance. ongoing  Patient  will demonstrate a gross improvement in lower extremity strength and decreased fall risk by performing 5 times sit<>stand test in 15 sec.  ongoing  Patient will demonstrate a significant change in gait tolerance for walking to his mailbox by ambulating 413 ft in 2 minutes.  ongoing     Plan     Outpatient Physical Therapy 2 times weekly to include the following interventions: Moist Heat/ Ice, Neuromuscular Re-ed, Patient Education, Therapeutic Activities, and Therapeutic Exercise.     Continue with circuit training. Maintain current weights for strengthening        Cecy Whitley, PT, DPT  6/29/2023

## 2023-06-29 NOTE — PROGRESS NOTES
Patient reports that he got confused and took Warfarin 5mg 6/28/2023, took correct Warfarin dose all other days, increased stress, broccoli on 6/26/23, no other changes reported.

## 2023-07-03 NOTE — SUBJECTIVE & OBJECTIVE
Past Medical History:   Diagnosis Date    CHF (congestive heart failure)     Dilated cardiomyopathy 1/10/2018    Disorder of kidney and ureter     CKD    Gout     HTN (hypertension)     Ventricular tachycardia (paroxysmal)        Past Surgical History:   Procedure Laterality Date    CARDIAC CATHETERIZATION  Dec. 2012    CARDIAC DEFIBRILLATOR PLACEMENT Left     CRRT-D    COLONOSCOPY N/A 3/6/2018    Procedure: COLONOSCOPY;  Surgeon: Alonso Bone MD;  Location: [address]);  Service: Endoscopy;  Laterality: N/A;       Review of patient's allergies indicates:   Allergen Reactions    Aspirin Other (See Comments)     Prevent II patient- Do NOT order aspirin. Please call Yenny [phone] if patient is admitted to hospital    Lisinopril Anaphylaxis       Current Facility-Administered Medications   Medication    [START ON 6/16/2018] allopurinol tablet 100 mg    [START ON 6/16/2018] amiodarone tablet 200 mg    [START ON 6/16/2018] amLODIPine tablet 10 mg    [START ON 6/16/2018] bumetanide tablet 1 mg    bumetanide tablet 2 mg    [START ON 6/16/2018] ferrous gluconate tablet 324 mg    hydrALAZINE tablet 100 mg    magnesium oxide tablet 400 mg    [START ON 6/16/2018] pantoprazole EC tablet 40 mg    [START ON 6/16/2018] potassium chloride SA CR tablet 20 mEq    [START ON 6/16/2018] spironolactone tablet 25 mg    [START ON 6/16/2018] warfarin (COUMADIN) tablet 5 mg     Family History     Problem Relation (Age of Onset)    Cancer Sister (54)    Coronary artery disease Father    Diabetes Father    Heart disease Father    Hypertension Father    No Known Problems Mother, Brother        Social History Main Topics    Smoking status: Former Smoker     Packs/day: 1.00     Years: 31.00     Types: Cigarettes     Quit date: 1/12/2018    Smokeless tobacco: Never Used      Comment: pt is quiting on his own - pt stated not qualified for program; 2/16 pt  quit on his own    Alcohol use No      Comment: one  Pt admits to LUQ pain x 1 week. Pain is constant, but improving over the past 3 days. Described as a dull pain with tenderness when he presses along that area. He thinks he may have pulled a muscle when getting out of the water when on vacation two weeks ago. No N/V/D/C. No recent URI. No fever or chills. No injuries. No SOB or cough.    fifth of gin or rum/week    Drug use: No    Sexual activity: Yes     Partners: Female     Birth control/ protection: None      Comment: 10/5/17  with same partner 7 years      Review of Systems   All other systems reviewed and are negative.    Objective:     Vital Signs (Most Recent):  Temp: 98.2 °F (36.8 °C) (06/15/18 1900)  Pulse: 88 (06/15/18 2000)  Resp: 18 (06/15/18 1900)  BP: (!) 84/0 (06/15/18 2043)  SpO2: 97 % (06/15/18 1900) Vital Signs (24h Range):  Temp:  [98.2 °F (36.8 °C)] 98.2 °F (36.8 °C)  Pulse:  [71-88] 88  Resp:  [18] 18  SpO2:  [97 %] 97 %  BP: (84-98)/(0-74) 84/0     No data found.    There is no height or weight on file to calculate BMI.    No intake or output data in the 24 hours ending 06/15/18 2146    Physical Exam   Constitutional: He is oriented to person, place, and time. He appears well-nourished. No distress.   HENT:   Head: Atraumatic.   Eyes: EOM are normal.   Neck: Neck supple. JVD (midneck) present.   Cardiovascular:   VAD hum   Pulmonary/Chest: Effort normal and breath sounds normal. No respiratory distress. He has no wheezes. He has no rales.   Abdominal: Soft. Bowel sounds are normal. He exhibits no distension. There is no tenderness.   Musculoskeletal: He exhibits no edema.   Neurological: He is alert and oriented to person, place, and time. No cranial nerve deficit.   Skin: Skin is warm. Capillary refill takes less than 2 seconds. No erythema.   Psychiatric: He has a normal mood and affect.       Significant Labs:  CBC:  No results for input(s): WBC, RBC, HGB, HCT, PLT, MCV, MCH, MCHC in the last 168 hours.  BNP:    Recent Labs  Lab 06/11/18  1206   *     CMP:    Recent Labs  Lab 06/11/18  1206      CALCIUM 9.7   ALBUMIN 4.6   PROT 8.8*   *   K 3.4*   CO2 28   CL 96*   BUN 23*   CREATININE 2.1*   ALKPHOS 88   ALT 26   AST 32   BILITOT 0.8      Coagulation:     Recent Labs  Lab 06/11/18  1206   INR 2.5*     LDH:  No results for input(s): LDH in the  last 72 hours.  Microbiology:  Microbiology Results (last 7 days)     ** No results found for the last 168 hours. **          I have reviewed all pertinent labs within the past 24 hours.

## 2023-07-05 ENCOUNTER — PATIENT MESSAGE (OUTPATIENT)
Dept: CARDIOLOGY | Facility: CLINIC | Age: 58
End: 2023-07-05
Payer: MEDICARE

## 2023-07-05 ENCOUNTER — PATIENT MESSAGE (OUTPATIENT)
Dept: FAMILY MEDICINE | Facility: CLINIC | Age: 58
End: 2023-07-05
Payer: MEDICARE

## 2023-07-05 NOTE — PROGRESS NOTES
07/05/2023-Patient sent [employer] message stating he will go for INR today, INR Lab appointment scheduled.

## 2023-07-06 ENCOUNTER — ANTI-COAG VISIT (OUTPATIENT)
Dept: CARDIOLOGY | Facility: CLINIC | Age: 58
End: 2023-07-06
Payer: MEDICARE

## 2023-07-06 ENCOUNTER — LAB VISIT (OUTPATIENT)
Dept: LAB | Facility: HOSPITAL | Age: 58
End: 2023-07-06
Attending: INTERNAL MEDICINE
Payer: MEDICARE

## 2023-07-06 DIAGNOSIS — Z95.811 LVAD (LEFT VENTRICULAR ASSIST DEVICE) PRESENT: ICD-10-CM

## 2023-07-06 DIAGNOSIS — Z79.01 LONG TERM (CURRENT) USE OF ANTICOAGULANTS: ICD-10-CM

## 2023-07-06 LAB
INR PPP: 2.4 (ref 0.8–1.2)
PROTHROMBIN TIME: 24.2 SEC (ref 9–12.5)

## 2023-07-06 PROCEDURE — 85610 PROTHROMBIN TIME: CPT | Performed by: INTERNAL MEDICINE

## 2023-07-06 PROCEDURE — 93793 ANTICOAG MGMT PT WARFARIN: CPT | Mod: S$GLB,,,

## 2023-07-06 PROCEDURE — 36415 COLL VENOUS BLD VENIPUNCTURE: CPT | Performed by: INTERNAL MEDICINE

## 2023-07-06 PROCEDURE — 93793 PR ANTICOAGULANT MGMT FOR PT TAKING WARFARIN: ICD-10-PCS | Mod: S$GLB,,,

## 2023-07-07 ENCOUNTER — TELEPHONE (OUTPATIENT)
Dept: TRANSPLANT | Facility: CLINIC | Age: 58
End: 2023-07-07
Payer: MEDICARE

## 2023-07-07 NOTE — TELEPHONE ENCOUNTER
Desirae Alexander RN 8 hours ago (9:24 AM)     LR  Patient called to say he does not want a RHC. He feels like it is being forced upon him so that [facility] can make more money. He wants to exhaust all other options before going under the knife. Explained the purpose of the procedure in assessing his heart and vad functioning and that it is not a surgical procedure. Patient states he has had two before and understands what they are. States he will come for his echo but not the RHC. Will cancel procedure.

## 2023-07-07 NOTE — TELEPHONE ENCOUNTER
Patient called to say he does not want a RHC. He feels like it is being forced upon him so that [facility] can make more money. He wants to exhaust all other options before going under the knife. Explained the purpose of the procedure in assessing his heart and vad functioning and that it is not a surgical procedure. Patient states he has had two before and understands what they are. States he will come for his echo but not the RHC. Will cancel procedure.

## 2023-07-10 ENCOUNTER — E-VISIT (OUTPATIENT)
Dept: FAMILY MEDICINE | Facility: CLINIC | Age: 58
End: 2023-07-10
Payer: MEDICARE

## 2023-07-10 DIAGNOSIS — Z12.11 ENCOUNTER FOR COLORECTAL CANCER SCREENING: ICD-10-CM

## 2023-07-10 DIAGNOSIS — Z12.12 ENCOUNTER FOR COLORECTAL CANCER SCREENING: ICD-10-CM

## 2023-07-10 DIAGNOSIS — R30.0 DYSURIA: Primary | ICD-10-CM

## 2023-07-10 PROCEDURE — 99423 PR E&M, ONLINE DIGIT, EST, < 7 DAYS,  21+ MINS: ICD-10-PCS | Mod: ,,, | Performed by: FAMILY MEDICINE

## 2023-07-10 PROCEDURE — 99423 OL DIG E/M SVC 21+ MIN: CPT | Mod: ,,, | Performed by: FAMILY MEDICINE

## 2023-07-10 NOTE — PROGRESS NOTES
See plan of care for updated physical therapy plan of care and progress note    Emma Curtis, PT, DPT,   Board-Certified Clinical Specialist in Neurologic Physical Therapy   Certified Brain Injury Specialist

## 2023-07-11 ENCOUNTER — ANTI-COAG VISIT (OUTPATIENT)
Dept: CARDIOLOGY | Facility: CLINIC | Age: 58
End: 2023-07-11
Payer: MEDICARE

## 2023-07-11 ENCOUNTER — CLINICAL SUPPORT (OUTPATIENT)
Dept: REHABILITATION | Facility: HOSPITAL | Age: 58
End: 2023-07-11
Payer: MEDICARE

## 2023-07-11 ENCOUNTER — LAB VISIT (OUTPATIENT)
Dept: LAB | Facility: HOSPITAL | Age: 58
End: 2023-07-11
Attending: INTERNAL MEDICINE
Payer: MEDICARE

## 2023-07-11 DIAGNOSIS — Z79.01 LONG TERM (CURRENT) USE OF ANTICOAGULANTS: ICD-10-CM

## 2023-07-11 DIAGNOSIS — E53.8 B12 DEFICIENCY: Primary | ICD-10-CM

## 2023-07-11 DIAGNOSIS — R53.1 DECREASED STRENGTH: ICD-10-CM

## 2023-07-11 DIAGNOSIS — Z95.811 LVAD (LEFT VENTRICULAR ASSIST DEVICE) PRESENT: ICD-10-CM

## 2023-07-11 DIAGNOSIS — R68.89 DECREASED FUNCTIONAL ACTIVITY TOLERANCE: Primary | ICD-10-CM

## 2023-07-11 LAB
INR PPP: 3.3 (ref 0.8–1.2)
PROTHROMBIN TIME: 31.9 SEC (ref 9–12.5)

## 2023-07-11 PROCEDURE — 93793 PR ANTICOAGULANT MGMT FOR PT TAKING WARFARIN: ICD-10-PCS | Mod: S$GLB,,,

## 2023-07-11 PROCEDURE — 36415 COLL VENOUS BLD VENIPUNCTURE: CPT | Performed by: INTERNAL MEDICINE

## 2023-07-11 PROCEDURE — 85610 PROTHROMBIN TIME: CPT | Performed by: INTERNAL MEDICINE

## 2023-07-11 PROCEDURE — 97530 THERAPEUTIC ACTIVITIES: CPT | Mod: PN | Performed by: PHYSICAL THERAPIST

## 2023-07-11 PROCEDURE — 93793 ANTICOAG MGMT PT WARFARIN: CPT | Mod: S$GLB,,,

## 2023-07-11 PROCEDURE — 97110 THERAPEUTIC EXERCISES: CPT | Mod: PN | Performed by: PHYSICAL THERAPIST

## 2023-07-11 RX ORDER — CYANOCOBALAMIN 1000 UG/ML
1000 INJECTION, SOLUTION INTRAMUSCULAR; SUBCUTANEOUS WEEKLY
Qty: 10 ML | Refills: 1 | Status: SHIPPED | OUTPATIENT
Start: 2023-07-11 | End: 2023-10-11 | Stop reason: SDUPTHER

## 2023-07-11 RX ORDER — SULFAMETHOXAZOLE AND TRIMETHOPRIM 800; 160 MG/1; MG/1
1 TABLET ORAL 2 TIMES DAILY
Qty: 14 TABLET | Refills: 0 | Status: SHIPPED | OUTPATIENT
Start: 2023-07-11 | End: 2023-07-18

## 2023-07-11 NOTE — PATIENT INSTRUCTIONS
Tim come and get the urine sample when you can as well  Sent in bactrim, sending a cologuard for the colon cancer sccreening!

## 2023-07-11 NOTE — PROGRESS NOTES
Patient ID: Tim Richards is a 57 y.o. male.    Chief Complaint: Urinary Tract Infection (Entered automatically based on patient selection in Patient Portal.)    The patient initiated a request through [employer] on 7/10/2023 for evaluation and management with a chief complaint of Urinary Tract Infection (Entered automatically based on patient selection in Patient Portal.)     I evaluated the questionnaire submission on 7/11/2023.    [facility] Peq Evisit Uti Questionnaire    7/10/2023  3:02 PM CDT - Filed by Patient   Do you agree to participate in an E-Visit? Yes   If you have any of the following problems, please present to your local ER or call 911:  I acknowledge   What is the main issue that you would like for your doctor to address today? Fatigue   Are you able to take your vital signs? No   What symptoms do you currently have? Change in urine appearance or smell   When did your symptoms first appear? 7/1/2023   List what you have done or taken to help your symptoms. Fatigue   Please indicate whether you have had the following symptoms during the past 24 hours     Urgent urination (a sudden and uncontrollable urge to urinate) None   Frequent urination of small amounts of urine (going to the toilet very often) None   Burning pain when urinating None   Incomplete bladder emptying (still feel like you need to urinate again after urination) None   Pain not associated with urination in the lower abdomen below the belly button) None   What does your urine look like? Clear   Blood seen in the urine None   Flank pain (pain in one or both sides of the lower back) None   Discharge from the urethra (urinary opening) without urination None   High body temperature/fever? None-<99.5   Please rate how much discomfort you have experience because of the symptoms in the past 24 hours: Moderate   Please indicate how the symptoms have interfered with your every day activities/work in the past 24 hours: Severe   Please indicate how  these symptoms have interfered with your social activities (visiting people, meeting with friends, etc.) in the past 24 hours? Severe   Are you a diabetic? No   Provide any information you feel is important to your history not asked above Fatigue no energy levels   Please attach any relevant images or files (if you have performed a home test for UTI, please submit a photo of results)          Recent Labs Obtained:  Lab Visit on 07/06/2023   Component Date Value Ref Range Status    Prothrombin Time 07/06/2023 24.2 (H)  9.0 - 12.5 sec Final    INR 07/06/2023 2.4 (H)  0.8 - 1.2 Final    Comment: Coumadin Therapy:  2.0 - 3.0 for INR for all indicators except mechanical heart valves  and antiphospholipid syndromes which should use 2.5 - 3.5.         Encounter Diagnoses   Name Primary?    Dysuria Yes    Encounter for colorectal cancer screening         Orders Placed This Encounter   Procedures    Cologuard Screening (Multitarget Stool DNA)     Standing Status:   Future     Number of Occurrences:   1     Standing Expiration Date:   9/11/2024    Urinalysis, Reflex to Urine Culture Urine, Clean Catch     Standing Status:   Future     Standing Expiration Date:   9/8/2024     Order Specific Question:   Preferred Collection Type     Answer:   Urine, Clean Catch     Order Specific Question:   Specimen Source     Answer:   Urine      Medications Ordered This Encounter   Medications    sulfamethoxazole-trimethoprim 800-160mg (BACTRIM DS) 800-160 mg Tab     Sig: Take 1 tablet by mouth 2 (two) times daily. for 7 days     Dispense:  14 tablet     Refill:  0        Follow up in about 2 weeks (around 7/24/2023), or if symptoms worsen or fail to improve, for reassess acute condition.      E-Visit Time Tracking:    Day 1 Time (in minutes): 22     Total Time (in minutes): 22

## 2023-07-12 NOTE — PROGRESS NOTES
[facility]    07/12/2023 2:34 PM    Assessment/Plan:    Patient presents today with supratherapeutic INR.    Assessment of patient findings and chart review: Previously noted to have RHC scheduled 7/14 - patient states this is no longer scheduled    Recommendation for patient's warfarin regimen: Decrease maintenance dose    Recommend repeat INR tomorrow  _________________________________________________________________    Tim Richards (57 y.o.) is followed by the [facility].    Anticoagulation Summary  As of 7/11/2023      INR goal:  2.0-3.0   TTR:  69.8 % (4.8 y)   INR used for dosing:  3.3 (7/11/2023)   Warfarin maintenance plan:  5 mg (5 mg x 1) every day   Weekly warfarin total:  35 mg   Plan last modified:  Pearl Mendez, PharmD (7/12/2023)   Next INR check:  7/13/2023   Target end date:      Indications    LVAD (left ventricular assist device) present (Resolved) [Z95.811]  Anticoagulation monitoring  INR range 2-3 (Resolved) [Z79.01]                 Anticoagulation Episode Summary       INR check location:  Clinic Lab    Preferred lab:      Send INR reminders to:  [facility] COUMADIN LVAD    Comments:  LVAD// [facility]//Lab [place] Results: [phone] [facility]# [id number] Phone: [phone] [phone]//dc [facility]/16/23 [facility] [phone], fax [phone] //pt declined meter, see 10/11/22 & 11/11/22 notes// Bridge:NO (h/o bleeds)          Anticoagulation Care Providers       Provider Role Specialty Phone number    Antonio Hadley Jr., MD Responsible Transplant [phone]

## 2023-07-13 ENCOUNTER — ANTI-COAG VISIT (OUTPATIENT)
Dept: CARDIOLOGY | Facility: CLINIC | Age: 58
End: 2023-07-13
Payer: MEDICARE

## 2023-07-13 ENCOUNTER — LAB VISIT (OUTPATIENT)
Dept: LAB | Facility: HOSPITAL | Age: 58
End: 2023-07-13
Attending: INTERNAL MEDICINE
Payer: MEDICARE

## 2023-07-13 ENCOUNTER — CLINICAL SUPPORT (OUTPATIENT)
Dept: REHABILITATION | Facility: HOSPITAL | Age: 58
End: 2023-07-13
Payer: MEDICARE

## 2023-07-13 DIAGNOSIS — Z79.01 LONG TERM (CURRENT) USE OF ANTICOAGULANTS: ICD-10-CM

## 2023-07-13 DIAGNOSIS — R68.89 DECREASED FUNCTIONAL ACTIVITY TOLERANCE: Primary | ICD-10-CM

## 2023-07-13 DIAGNOSIS — R53.1 DECREASED STRENGTH: ICD-10-CM

## 2023-07-13 DIAGNOSIS — Z95.811 LVAD (LEFT VENTRICULAR ASSIST DEVICE) PRESENT: ICD-10-CM

## 2023-07-13 LAB
INR PPP: 2.8 (ref 0.8–1.2)
PROTHROMBIN TIME: 27.4 SEC (ref 9–12.5)

## 2023-07-13 PROCEDURE — 93793 ANTICOAG MGMT PT WARFARIN: CPT | Mod: S$GLB,,,

## 2023-07-13 PROCEDURE — 36415 COLL VENOUS BLD VENIPUNCTURE: CPT | Performed by: INTERNAL MEDICINE

## 2023-07-13 PROCEDURE — 93793 PR ANTICOAGULANT MGMT FOR PT TAKING WARFARIN: ICD-10-PCS | Mod: S$GLB,,,

## 2023-07-13 PROCEDURE — 85610 PROTHROMBIN TIME: CPT | Performed by: INTERNAL MEDICINE

## 2023-07-13 PROCEDURE — 97110 THERAPEUTIC EXERCISES: CPT | Mod: PN | Performed by: PHYSICAL THERAPIST

## 2023-07-13 NOTE — PROGRESS NOTES
Physical Therapy Daily Treatment Note     Name: Tim Somers [place]  Clinic Number: [id number]    Therapy Diagnosis:   Encounter Diagnoses   Name Primary?    Decreased functional activity tolerance Yes    Decreased strength        Physician: Antonio Hadley Jr*    Visit Date: 7/13/2023    Physician Orders: PT Eval and Treat   Medical Diagnosis from Referral:   R53.81 (ICD-10-CM) - Physical debility   R26.89 (ICD-10-CM) - Poor balance      Evaluation Date: 5/3/2023  Authorization Period Expiration: 12/31/23  plan of care: 7/1/23 to 8/11/23  Visit # / Visits authorized: 14/24     PN due: 8/11/23    Time In: 1204  Time Out: 1250  Total Billable Time: 46 minutes    Precautions: Standard and LVAD    Subjective     Pt reports: no new reports  He was compliant with home exercise program.  Response to previous treatment: tired  Functional change: ongoing    Pain: No significant pain reported  Location: NA     Objective     Tim received therapeutic exercises to develop strength, endurance, and flexibility for 46 minutes including:    upper extremity ergometer  L1 x 5 mins    step tap onto 8 in step 2 x 10    Matrix:   Hip abduction 25# 3 x 8  hamstring curls 20# 3 x 10  Knee extension 15# 3 x 8     Recumbent stepper bilateral upper extremity/ lower extremity L4 x 7.5 mins, for cardiovascular endurance and strength    Standing at free motion:   Lat pull down 13# 2 x 10  Upper extremity chopping (high to low) 10 lb 2 x 8 each side     Sitting:   HSS 2 x 30 sec    Home Exercises Provided and Patient Education Provided     Education provided:   - continue with previous home exercise program        Written Home Exercises Provided: Patient instructed to cont prior HEP.   Exercises were reviewed and Tim was able to demonstrate them prior to the end of the session.  Tim demonstrated good  understanding of the education provided.     See EMR under Patient Instructions for exercises provided prior visit.    Assessment      Tim tolerated physical therapy session well with rest breaks provided to address fatigue. Patient reports right hip soreness from previous fall is improving. Patient attempted to increase duration on cardiovascular equipment but was unable to tolerate due to fatigue/ shortness of breath.  Patient can benefit from continued skilled physical therapy to improve activity tolerance for daily mobility/ tasks.     Tim Is progressing well towards his goals.   Pt prognosis is Good.     Pt will continue to benefit from skilled outpatient physical therapy to address the deficits listed in the problem list box on initial evaluation, provide pt/family education and to maximize pt's level of independence in the home and community environment.     Pt's spiritual, cultural and educational needs considered and pt agreeable to plan of care and goals.     Anticipated barriers to physical therapy: longstanding history of cardiac diagnoses    Goals:   Status as of 7/11/23  Short Term Goals: 5 weeks   Patient will report performance of home exercise program for strengthening 3x/ week to improve fitness and activity tolerance. Met 5/30/23  Patient will demonstrate improved confidence in mobility by stating he feels 70% confident in balance while walking across a parking lot. Improved- 50%  Patient will tolerate 10 consecutive minutes on cardiovascular equipment at low resistance to demonstrate improved activity tolerance. Improved- 7 mins  Patient will demonstrate a gross improvement in lower extremity strength by performing 5 times sit<>stand test in 20 sec.  ongoing     Long Term Goals: 10 weeks   Patient will demonstrate improved confidence in mobility by stating he feel 100% confident in balance while walking across a parking lot. ongoing  Patient will tolerate 10 consecutive minutes on cardiovascular equipment at moderate resistance to demonstrate improved activity tolerance. improved  Patient will demonstrate a gross  improvement in lower extremity strength and decreased fall risk by performing 5 times sit<>stand test in 15 sec.  ongoing  Patient will demonstrate a significant change in gait tolerance for walking to his mailbox by ambulating 413 ft in 2 minutes.  improved- 355 ft     Plan     Outpatient Physical Therapy 2 times weekly to include the following interventions: Moist Heat/ Ice, Neuromuscular Re-ed, Patient Education, Therapeutic Activities, and Therapeutic Exercise.     Continue with circuit training. Increased duration of CV equipment        Emma Curtis, PT, DPT,   Board-Certified Clinical Specialist in Neurologic Physical Therapy   7/13/2023

## 2023-07-13 NOTE — PLAN OF CARE
Physical Therapy Daily Treatment Note     Name: Tim Richards  Clinic Number: [id number]    Therapy Diagnosis:   Encounter Diagnoses   Name Primary?    Decreased functional activity tolerance Yes    Decreased strength        Physician: Antonio Hadley Jr*    Visit Date: 7/11/2023    Physician Orders: PT Eval and Treat   Medical Diagnosis from Referral:   R53.81 (ICD-10-CM) - Physical debility   R26.89 (ICD-10-CM) - Poor balance      Evaluation Date: 5/3/2023  Authorization Period Expiration: 12/31/23  Plan of Care Expiration: 6/30/2023   New plan of care: 7/1/23 to 8/11/23  Visit # / Visits authorized: 13/24     PN due: 8/11/23    Time In: 1434  Time Out: 1525  Total Billable Time: 41 minutes  Total treatment time: 51 minutes    Precautions: Standard and LVAD    Subjective     Pt reports: endurance is getting a little better  He was compliant with home exercise program.  Response to previous treatment: tired  Functional change: ongoing    Pain: minor   Location: left side of trunk from bandage change     CMS Impairment/Limitation/Restriction for FOTO Balance Survey    Therapist reviewed FOTO scores for Tim Richards on 7/11/2023.   FOTO documents entered into [employer] - see Media section.    Limitation Score: 53%        Objective      Evaluation 5/30/23 7/11/23   Single Limb Stance R LE Not tested   (<10 sec = HIGH FALL RISK) Not tested  Not tested    Single Limb Stance L LE Not tested   (<10 sec = HIGH FALL RISK) Not tested  Not tested    5 times sit-stand 25 seconds, (6-7/10 dizziness)    21 sec (4/10 dizziness) 23 sec (3/10 dizziness)   2 mins walk 279 ft 325 ft 355 ft      5 times sit<>stand Cutoff scores:  >12 sec= fall risk  PD: >16 seconds= fall risk  Vestibular/balance: 15 seconds over 65 years  CVA: 12 seconds    Timed Up and Go fall risk:   Community dwelling older adults >13.5 sec   Chronic CVA >14 sec   Geriatric with h/o falls >15 sec   Frail elderly >32.6 sec    LE amputees >19 sec   PD  ">7.95- 11.5 sec   Hip OA >10 sec   Vestibular >11.1 sec      2 minute Walk Test:   Diagnosis Normal (ft)   MS Mild 352 ft-717 ft  Moderate 130 ft-561 ft   Geriatric  490 ft   Chronic  ft   SC I Para 330 ft  Tetra  378 ft   COPD 413 ft     Postural control:  MCTSIB:  1. Eyes Open/feet together/Firm: 30 seconds, no sway  2. Eyes Closed/feet together/Firm: 30 seconds, minimal sway  3. Eyes Open/feet together/Foam: 30 seconds, minimal sway  4. Eyes Closed/feet together/Foam: 28 seconds, minimal-moderate sway       Tim participated in dynamic functional therapeutic activities to improve functional performance for 9  minutes, includin times sit<>stand test  2 mins walk  MCTSIB      Tim received therapeutic exercises to develop strength, endurance, and flexibility for 42 minutes including:    Recumbent stepper bilateral upper extremity/ lower extremity L4 x 7 mins, for cardiovascular endurance and strength    Standing at free motion:   Lat pull down 13# 2 x 10  Upper extremity chopping (high to low) 10 lb 2 x 8 each side     Sitting:   HSS 2 x 30 sec      Home Exercises Provided and Patient Education Provided     Education provided:   - continue with previous home exercise program  - reviewed results of assessments, recommend continued physical therapy        Written Home Exercises Provided: Patient instructed to cont prior HEP.   Exercises were reviewed and Tim was able to demonstrate them prior to the end of the session.  Tim demonstrated good  understanding of the education provided.     See EMR under Patient Instructions for exercises provided prior visit.    Assessment   Assessment period: 23 to 2023    Tim tolerates physical therapy session well and demonstrates improved activity tolerance. a gross improvement in tolerance to daily mobility is reported. Patient reported 1 fall this assessment period due to tripping over a coffee table. Patient attributed this to "not paying " "attention" versus a true loss of balance. Improved confidence in mobility is reported via FOTO. Improved activity tolerance is demonstrated with patient tolerating up to 7 minutes of consecutive cardiovascular activity prior to fatigue. Patient demonstrates improved gait distance during 2 minute walk test indicating improved endurance. Less dizziness is being reported with 5 times sit<>stand test but patient continues to perform at a slower than normal speed. Balance on various surfaces has improved but patient continues to have some difficulty maintaining balance with eyes closed on compliant surfaces. This places him at increased risk for falling in dim environments. Patient can benefit from continued skilled physical therapy to address remaining impairments to improve balance and activity tolerance for improved quality of life. Plan of care extended x 6 weeks to allow for continued progress towards goals set.     Tim Is progressing well towards his goals.   Pt prognosis is Good.     Pt will continue to benefit from skilled outpatient physical therapy to address the deficits listed in the problem list box on initial evaluation, provide pt/family education and to maximize pt's level of independence in the home and community environment.     Pt's spiritual, cultural and educational needs considered and pt agreeable to plan of care and goals.     Anticipated barriers to physical therapy: longstanding history of cardiac diagnoses    Goals:   Status as of 7/11/23  Short Term Goals: 5 weeks   Patient will report performance of home exercise program for strengthening 3x/ week to improve fitness and activity tolerance. Met 5/30/23  Patient will demonstrate improved confidence in mobility by stating he feels 70% confident in balance while walking across a parking lot. Improved- 50%  Patient will tolerate 10 consecutive minutes on cardiovascular equipment at low resistance to demonstrate improved activity tolerance. " Improved- 7 mins  Patient will demonstrate a gross improvement in lower extremity strength by performing 5 times sit<>stand test in 20 sec.  ongoing     Long Term Goals: 10 weeks   Patient will demonstrate improved confidence in mobility by stating he feel 100% confident in balance while walking across a parking lot. ongoing  Patient will tolerate 10 consecutive minutes on cardiovascular equipment at moderate resistance to demonstrate improved activity tolerance. improved  Patient will demonstrate a gross improvement in lower extremity strength and decreased fall risk by performing 5 times sit<>stand test in 15 sec.  ongoing  Patient will demonstrate a significant change in gait tolerance for walking to his mailbox by ambulating 413 ft in 2 minutes.  improved- 355 ft     Plan     New plan of care: 7/1/23 to 8/11/23    Outpatient Physical Therapy 2 times weekly to include the following interventions: Moist Heat/ Ice, Neuromuscular Re-ed, Patient Education, Therapeutic Activities, and Therapeutic Exercise.     Continue with circuit training. Increase CV equipment to 10 mins as tolerated       Emma Curtis, PT, DPT,   Board-Certified Clinical Specialist in Neurologic Physical Therapy   7/11/2023

## 2023-07-13 NOTE — SUBJECTIVE & OBJECTIVE
Interval History: No acute events overnight.    Continuous Infusions:   dextrose 10 % in water (D10W)      heparin (porcine) in D5W 15 Units/kg/hr (08/28/22 1802)     Scheduled Meds:   amiodarone  400 mg Oral Daily    aspirin  81 mg Oral Daily    levalbuterol  0.63 mg Nebulization Q6H    levothyroxine  50 mcg Oral Before breakfast    magnesium oxide  400 mg Oral BID    mexiletine  250 mg Oral Q8H    mirtazapine  30 mg Oral QHS    ondansetron  4 mg Intravenous Once    pantoprazole  40 mg Oral Daily    polyethylene glycol  17 g Oral Daily    predniSONE  5 mg Oral Daily    senna-docusate 8.6-50 mg  1 tablet Oral Daily    warfarin  7.5 mg Oral Daily     PRN Meds:sodium chloride 0.9%, acetaminophen, ALPRAZolam, bisacodyL, dextrose 10 % in water (D10W), dextrose 10%, dextrose 10%, glucagon (human recombinant), glucose, guaiFENesin 100 mg/5 ml, HYDROmorphone, HYDROmorphone, hydrOXYzine HCL, meclizine, melatonin, ondansetron    Review of patient's allergies indicates:   Allergen Reactions    Lisinopril Anaphylaxis    Hydralazine analogues      Chronic constipation, impotence, dizziness     Objective:     Vital Signs (Most Recent):  Temp: 98.3 °F (36.8 °C) (08/29/22 0719)  Pulse: 60 (08/29/22 0912)  Resp: 18 (08/29/22 0912)  BP: (!) 80/0 (08/29/22 0719)  SpO2: 96 % (08/29/22 0719)   Vital Signs (24h Range):  Temp:  [97.7 °F (36.5 °C)-98.8 °F (37.1 °C)] 98.3 °F (36.8 °C)  Pulse:  [60-99] 60  Resp:  [16-20] 18  SpO2:  [95 %-99 %] 96 %  BP: (68-80)/(0) 80/0     Patient Vitals for the past 72 hrs (Last 3 readings):   Weight   08/29/22 0433 93.7 kg (206 lb 9.1 oz)   08/28/22 0336 93.7 kg (206 lb 9.1 oz)   08/27/22 0500 94 kg (207 lb 3.7 oz)       Body mass index is 27.25 kg/m².      Intake/Output Summary (Last 24 hours) at 8/29/2022 1013  Last data filed at 8/29/2022 0818  Gross per 24 hour   Intake 921 ml   Output 1500 ml   Net -579 ml            Telemetry: No significant arrhythmic events overnight.    Physical Exam  Vitals and  nursing note reviewed.   Constitutional:       General: He is not in acute distress.     Appearance: Normal appearance. He is well-developed. He is not ill-appearing.   HENT:      Head: Normocephalic and atraumatic.      Ears:      Abel exam findings: Lateralizes left.     Right Rinne: AC > BC.     Left Rinne: AC > BC.     Nose: Nose normal.      Mouth/Throat:      Mouth: Mucous membranes are moist.      Comments: Tracheostomy site is clean and dry without drainage  Eyes:      Extraocular Movements: Extraocular movements intact.      Conjunctiva/sclera: Conjunctivae normal.      Pupils: Pupils are equal, round, and reactive to light.   Cardiovascular:      Rate and Rhythm: Normal rate and regular rhythm.      Pulses: Normal pulses.      Heart sounds: Normal heart sounds.      Comments: VAD hum can be heard, no significant JVD noted on exam  Pulmonary:      Effort: Pulmonary effort is normal.      Breath sounds: Normal breath sounds. No stridor.   Abdominal:      General: Abdomen is flat. Bowel sounds are normal. There is no distension.      Palpations: Abdomen is soft.      Tenderness: There is no abdominal tenderness. There is no guarding.   Musculoskeletal:         General: Normal range of motion.      Cervical back: Normal range of motion and neck supple. No rigidity.   Lymphadenopathy:      Cervical: No cervical adenopathy.   Skin:     General: Skin is warm and dry.      Capillary Refill: Capillary refill takes less than 2 seconds.   Neurological:      General: No focal deficit present.      Mental Status: He is alert and oriented to person, place, and time.   Psychiatric:         Mood and Affect: Mood normal.         Behavior: Behavior normal.         Thought Content: Thought content normal.         Judgment: Judgment normal.       Significant Labs:  CBC:  Recent Labs   Lab 08/27/22  0511 08/28/22  0506 08/28/22  0719 08/29/22  0428   WBC 7.28 6.79  --  5.28   RBC 2.88* 2.79*  --  2.58*   HGB 7.9* 7.8* 7.1*  7.1*   HCT 27.1* 26.7*  --  24.5*    174  --  176   MCV 94 96  --  95   MCH 27.4 28.0  --  27.5   MCHC 29.2* 29.2*  --  29.0*       BNP:  Recent Labs   Lab 08/24/22  0448 08/26/22  0425 08/29/22 0428   * 210* 171*       CMP:  Recent Labs   Lab 08/24/22  0856 08/25/22  0338 08/26/22  0425 08/27/22  0511 08/28/22  0506 08/29/22 0428   GLU 68* 93   < > 81 91 72   CALCIUM 8.7 8.9   < > 9.0 8.9 9.1   ALBUMIN 2.2* 2.1*  --   --   --   --    PROT 5.9* 5.6*  --   --   --   --    * 134*   < > 130* 134* 133*   K 4.5 4.7   < > 4.6 4.8 4.7   CO2 28 28   < > 26 29 29   CL 99 100   < > 98 100 98   BUN 17 20   < > 16 15 15   CREATININE 1.1 1.2   < > 1.1 1.0 1.0   ALKPHOS 166* 159*  --   --   --   --    ALT 56* 53*  --   --   --   --    AST 32 29  --   --   --   --    BILITOT 1.3* 1.2*  --   --   --   --     < > = values in this interval not displayed.        Coagulation:   Recent Labs   Lab 08/27/22  0511 08/27/22  1208 08/28/22  0215 08/28/22  0506 08/29/22 0428   INR 1.5*  --   --  1.5* 1.8*   APTT 54.9*   < > 39.3* 40.7* 45.3*    < > = values in this interval not displayed.       LDH:  Recent Labs   Lab 08/27/22  0511 08/28/22  0506 08/29/22 0428   * 229 229       Microbiology:  Microbiology Results (last 7 days)       ** No results found for the last 168 hours. **            I have reviewed all pertinent labs within the past 24 hours.    Estimated Creatinine Clearance: 94.3 mL/min (based on SCr of 1 mg/dL).    Diagnostic Results:  I have reviewed all pertinent imaging results/findings within the past 24 hours.   None

## 2023-07-13 NOTE — PROGRESS NOTES
[facility]    2023 2:35 PM    Assessment/Plan:    Patient presents today with therapeutic INR.    Assessment of patient findings and chart review: no significant findings    Recommendation for patient's warfarin regimen: Continue current maintenance dose    Recommend repeat INR Tuesday  _________________________________________________________________    Tim Richards (57 y.o.) is followed by the [facility].    Anticoagulation Summary  As of 2023      INR goal:  2.0-3.0   TTR:  69.8 % (4.8 y)   INR used for dosin.8 (2023)   Warfarin maintenance plan:  5 mg (5 mg x 1) every day   Weekly warfarin total:  35 mg   Plan last modified:  Pearl Mendez, PharmD (2023)   Next INR check:  2023   Target end date:      Indications    LVAD (left ventricular assist device) present (Resolved) [Z95.811]  Anticoagulation monitoring  INR range 2-3 (Resolved) [Z79.01]                 Anticoagulation Episode Summary       INR check location:  Clinic Lab    Preferred lab:      Send INR reminders to:  [facility]IN LVAD    Comments:  LVAD// [facility]//[facility] Results: [phone] Acct# [id number] Phone: [phone] [phone]//[address] [facility] -[phone], fax [phone] //pt declined meter, see 10/11/22 & 22 notes// Bridge:NO (h/o bleeds)          Anticoagulation Care Providers       Provider Role Specialty Phone number    Antonio Hadley Jr., MD Responsible Transplant [phone]

## 2023-07-18 ENCOUNTER — LAB VISIT (OUTPATIENT)
Dept: LAB | Facility: HOSPITAL | Age: 58
End: 2023-07-18
Attending: INTERNAL MEDICINE
Payer: MEDICARE

## 2023-07-18 ENCOUNTER — DOCUMENTATION ONLY (OUTPATIENT)
Dept: REHABILITATION | Facility: HOSPITAL | Age: 58
End: 2023-07-18
Payer: MEDICARE

## 2023-07-18 ENCOUNTER — ANTI-COAG VISIT (OUTPATIENT)
Dept: CARDIOLOGY | Facility: CLINIC | Age: 58
End: 2023-07-18
Payer: MEDICARE

## 2023-07-18 DIAGNOSIS — Z95.811 LVAD (LEFT VENTRICULAR ASSIST DEVICE) PRESENT: ICD-10-CM

## 2023-07-18 DIAGNOSIS — Z79.01 LONG TERM (CURRENT) USE OF ANTICOAGULANTS: ICD-10-CM

## 2023-07-18 LAB
INR PPP: 2.7 (ref 0.8–1.2)
PROTHROMBIN TIME: 27.2 SEC (ref 9–12.5)

## 2023-07-18 PROCEDURE — 36415 COLL VENOUS BLD VENIPUNCTURE: CPT | Performed by: INTERNAL MEDICINE

## 2023-07-18 PROCEDURE — 93793 PR ANTICOAGULANT MGMT FOR PT TAKING WARFARIN: ICD-10-PCS | Mod: S$GLB,,,

## 2023-07-18 PROCEDURE — 93793 ANTICOAG MGMT PT WARFARIN: CPT | Mod: S$GLB,,,

## 2023-07-18 PROCEDURE — 85610 PROTHROMBIN TIME: CPT | Performed by: INTERNAL MEDICINE

## 2023-07-18 NOTE — PROGRESS NOTES
Missed Visit/Cancellation      Date: 7/18/2023         Canceled Number: 1  No Show Number: 0                                                                                                                Pt initially had visit scheduled for today for 1430.   Reason for cancellation: not feeling well.    Pt's next scheduled physical therapy visit is 7/20/23.    Emma Curtis, PT, DPT  7/18/2023

## 2023-07-20 ENCOUNTER — CLINICAL SUPPORT (OUTPATIENT)
Dept: REHABILITATION | Facility: HOSPITAL | Age: 58
End: 2023-07-20
Payer: MEDICARE

## 2023-07-20 DIAGNOSIS — R53.1 DECREASED STRENGTH: ICD-10-CM

## 2023-07-20 DIAGNOSIS — R68.89 DECREASED FUNCTIONAL ACTIVITY TOLERANCE: Primary | ICD-10-CM

## 2023-07-20 PROCEDURE — 97110 THERAPEUTIC EXERCISES: CPT | Mod: PN

## 2023-07-20 NOTE — PROGRESS NOTES
"  Physical Therapy Daily Treatment Note     Name: Tim Richards  Clinic Number: [id number]    Therapy Diagnosis:   Encounter Diagnoses   Name Primary?    Decreased functional activity tolerance Yes    Decreased strength        Physician: Antonio Hadley Jr*    Visit Date: 7/20/2023    Physician Orders: PT Eval and Treat   Medical Diagnosis from Referral:   R53.81 (ICD-10-CM) - Physical debility   R26.89 (ICD-10-CM) - Poor balance      Evaluation Date: 5/3/2023  Authorization Period Expiration: 12/31/23  plan of care: 7/1/23 to 8/11/23  Visit # / Visits authorized: 15/24     PN due: 8/11/23    Time In: 1:50  Time Out: 2:30  Total Billable Time: 30 minutes (Total time: 40 minutes)    Precautions: Standard and LVAD    Subjective     Tim reports having a somewhat regular episode of "weakness, wooziness, and heart palpitations" which began Monday evening. He reports these happen occasionally since his cardiac problems manifested. He reports symptoms have reduced since Tuesday, and that his levels checked were all WNL.    He was compliant with home exercise program.  Response to previous treatment: tired  Functional change: ongoing    Pain: No significant pain reported  Location: NA     Objective     Treatment modified today (7/20/23) due to report above    Tim received therapeutic exercises to develop strength, endurance, and flexibility for 30 minutes including:    -recumbent stepper bilateral lower extremity L3 x 6 minutes, for cardiovascular endurance and strength  -Matrix hip abduction, 35 to 30#; 3 x 8  -sit-to-stands x 6    -step-up/down 4-inch step    -x 5 with L handrail     -x 10 without UE support, with CGA    -balance on foam with eyes closed    -x 60 seconds    -x 30 seconds    -x 30 seconds     -rest breaks provided frequently due to patient current condition    Home Exercises Provided and Patient Education Provided     Education provided:   - continue with previous home exercise program    " "    Written Home Exercises Provided: Patient instructed to cont prior HEP.   Exercises were reviewed and Tim was able to demonstrate them prior to the end of the session.  Tim demonstrated good  understanding of the education provided.     See EMR under Patient Instructions for exercises provided prior visit.    Assessment     Mr. Richards tolerated modified session well. Mid-way through TE, he reported feeling pressure in head after standing up quickly. Increased rest break and lighter activity were implemented following. Patient reported alleviated. Patient completed LE/ glute strengthening with good form and favorable response to cues for pacing. Minimal sway with step-ups without UE use, but improved within reps. Minimal sway with sensory interaction training on foam with eyes closed. Patient reported "feeling good" at end of session. Patient can benefit from continued skilled physical therapy to improve activity tolerance for daily mobility/ tasks.     Tim Is progressing well towards his goals.   Pt prognosis is Good.     Pt will continue to benefit from skilled outpatient physical therapy to address the deficits listed in the problem list box on initial evaluation, provide pt/family education and to maximize pt's level of independence in the home and community environment.     Pt's spiritual, cultural and educational needs considered and pt agreeable to plan of care and goals.     Anticipated barriers to physical therapy: longstanding history of cardiac diagnoses    Goals:   Status as of 7/11/23  Short Term Goals: 5 weeks   Patient will report performance of home exercise program for strengthening 3x/ week to improve fitness and activity tolerance. Met 5/30/23  Patient will demonstrate improved confidence in mobility by stating he feels 70% confident in balance while walking across a parking lot. Improved- 50%  Patient will tolerate 10 consecutive minutes on cardiovascular equipment at low resistance to " demonstrate improved activity tolerance. Improved- 7 mins  Patient will demonstrate a gross improvement in lower extremity strength by performing 5 times sit<>stand test in 20 sec.  ongoing     Long Term Goals: 10 weeks   Patient will demonstrate improved confidence in mobility by stating he feel 100% confident in balance while walking across a parking lot. ongoing  Patient will tolerate 10 consecutive minutes on cardiovascular equipment at moderate resistance to demonstrate improved activity tolerance. improved  Patient will demonstrate a gross improvement in lower extremity strength and decreased fall risk by performing 5 times sit<>stand test in 15 sec.  ongoing  Patient will demonstrate a significant change in gait tolerance for walking to his mailbox by ambulating 413 ft in 2 minutes.  improved- 355 ft     Plan     Outpatient Physical Therapy 2 times weekly to include the following interventions: Moist Heat/ Ice, Neuromuscular Re-ed, Patient Education, Therapeutic Activities, and Therapeutic Exercise.     Continue with circuit training. Increased duration of CV equipment        Veronica Colon, PT, DPT    7/20/2023

## 2023-07-21 LAB — NONINV COLON CA DNA+OCC BLD SCRN STL QL: NORMAL

## 2023-07-24 ENCOUNTER — HOSPITAL ENCOUNTER (OUTPATIENT)
Dept: CARDIOLOGY | Facility: HOSPITAL | Age: 58
Discharge: HOME | End: 2023-07-24
Attending: INTERNAL MEDICINE
Payer: MEDICARE

## 2023-07-24 ENCOUNTER — PATIENT MESSAGE (OUTPATIENT)
Dept: ELECTROPHYSIOLOGY | Facility: CLINIC | Age: 58
End: 2023-07-24
Payer: MEDICARE

## 2023-07-24 VITALS — BODY MASS INDEX: 29.03 KG/M2 | HEART RATE: 73 BPM | WEIGHT: 219 LBS | HEIGHT: 73 IN

## 2023-07-24 DIAGNOSIS — Z95.811 HEART REPLACED BY HEART ASSIST DEVICE: ICD-10-CM

## 2023-07-24 LAB
ASCENDING AORTA: 3.03 CM
BSA FOR ECHO PROCEDURE: 2.26 M2
CV ECHO LV RWT: 0.28 CM
DOP CALC LVOT AREA: 4.2 CM2
DOP CALC LVOT DIAMETER: 2.3 CM
E WAVE DECELERATION TIME: 218.61 MSEC
E/A RATIO: 1.69
E/E' RATIO: 9.8 M/S
ECHO LV POSTERIOR WALL: 0.96 CM (ref 0.6–1.1)
EJECTION FRACTION: 10 %
FRACTIONAL SHORTENING: 6 % (ref 28–44)
INTERVENTRICULAR SEPTUM: 0.94 CM (ref 0.6–1.1)
IVRT: 148.43 MSEC
LA MAJOR: 7.44 CM
LA MINOR: 7.41 CM
LA WIDTH: 3.91 CM
LEFT ATRIUM SIZE: 4.68 CM
LEFT ATRIUM VOLUME INDEX MOD: 29.1 ML/M2
LEFT ATRIUM VOLUME INDEX: 51.6 ML/M2
LEFT ATRIUM VOLUME MOD: 65.17 CM3
LEFT ATRIUM VOLUME: 115.49 CM3
LEFT INTERNAL DIMENSION IN SYSTOLE: 6.51 CM (ref 2.1–4)
LEFT VENTRICLE DIASTOLIC VOLUME INDEX: 112.12 ML/M2
LEFT VENTRICLE DIASTOLIC VOLUME: 251.14 ML
LEFT VENTRICLE MASS INDEX: 133 G/M2
LEFT VENTRICLE SYSTOLIC VOLUME INDEX: 96.9 ML/M2
LEFT VENTRICLE SYSTOLIC VOLUME: 217.09 ML
LEFT VENTRICULAR INTERNAL DIMENSION IN DIASTOLE: 6.95 CM (ref 3.5–6)
LEFT VENTRICULAR MASS: 298 G
LV LATERAL E/E' RATIO: 9.8 M/S
LV SEPTAL E/E' RATIO: 9.8 M/S
MV A" WAVE DURATION": 7.71 MSEC
MV PEAK A VEL: 0.29 M/S
MV PEAK E VEL: 0.49 M/S
MV STENOSIS PRESSURE HALF TIME: 63.4 MS
MV VALVE AREA P 1/2 METHOD: 3.47 CM2
PISA TR MAX VEL: 2.78 M/S
PULM VEIN S/D RATIO: 1.15
PV PEAK D VEL: 0.26 M/S
PV PEAK S VEL: 0.3 M/S
RA MAJOR: 6.51 CM
RA PRESSURE: 15 MMHG
RA WIDTH: 5.42 CM
RIGHT VENTRICULAR END-DIASTOLIC DIMENSION: 5.6 CM
SINUS: 3.02 CM
STJ: 2.75 CM
TDI LATERAL: 0.05 M/S
TDI SEPTAL: 0.05 M/S
TDI: 0.05 M/S
TR MAX PG: 31 MMHG
TRICUSPID ANNULAR PLANE SYSTOLIC EXCURSION: 0.8 CM
TV REST PULMONARY ARTERY PRESSURE: 46 MMHG

## 2023-07-24 PROCEDURE — 93306 ECHO (CUPID ONLY): ICD-10-PCS | Mod: 26,,, | Performed by: INTERNAL MEDICINE

## 2023-07-24 PROCEDURE — 93306 TTE W/DOPPLER COMPLETE: CPT

## 2023-07-24 PROCEDURE — 93306 TTE W/DOPPLER COMPLETE: CPT | Mod: 26,,, | Performed by: INTERNAL MEDICINE

## 2023-07-25 ENCOUNTER — ANTI-COAG VISIT (OUTPATIENT)
Dept: CARDIOLOGY | Facility: CLINIC | Age: 58
End: 2023-07-25
Payer: MEDICARE

## 2023-07-25 ENCOUNTER — CLINICAL SUPPORT (OUTPATIENT)
Dept: REHABILITATION | Facility: HOSPITAL | Age: 58
End: 2023-07-25
Payer: MEDICARE

## 2023-07-25 ENCOUNTER — TELEPHONE (OUTPATIENT)
Dept: TRANSPLANT | Facility: CLINIC | Age: 58
End: 2023-07-25
Payer: MEDICARE

## 2023-07-25 DIAGNOSIS — T46.2X1A HYPOTHYROIDISM DUE TO AMIODARONE: Primary | ICD-10-CM

## 2023-07-25 DIAGNOSIS — R68.89 DECREASED FUNCTIONAL ACTIVITY TOLERANCE: Primary | ICD-10-CM

## 2023-07-25 DIAGNOSIS — E03.2 HYPOTHYROIDISM DUE TO AMIODARONE: Primary | ICD-10-CM

## 2023-07-25 DIAGNOSIS — R53.1 DECREASED STRENGTH: ICD-10-CM

## 2023-07-25 PROCEDURE — 93793 PR ANTICOAGULANT MGMT FOR PT TAKING WARFARIN: ICD-10-PCS | Mod: S$GLB,,,

## 2023-07-25 PROCEDURE — 93793 ANTICOAG MGMT PT WARFARIN: CPT | Mod: S$GLB,,,

## 2023-07-25 PROCEDURE — 97110 THERAPEUTIC EXERCISES: CPT | Mod: PN | Performed by: PHYSICAL THERAPIST

## 2023-07-25 RX ORDER — LEVOTHYROXINE SODIUM 112 UG/1
112 TABLET ORAL
Qty: 30 TABLET | Refills: 11 | Status: SHIPPED | OUTPATIENT
Start: 2023-07-25 | End: 2023-09-11

## 2023-07-25 NOTE — PROGRESS NOTES
Physical Therapy Daily Treatment Note     Name: Tim Somers Richards  Clinic Number: [id number]    Therapy Diagnosis:   Encounter Diagnoses   Name Primary?    Decreased functional activity tolerance Yes    Decreased strength        Physician: Antonio Hadley Jr*    Visit Date: 7/25/2023    Physician Orders: PT Eval and Treat   Medical Diagnosis from Referral:   R53.81 (ICD-10-CM) - Physical debility   R26.89 (ICD-10-CM) - Poor balance      Evaluation Date: 5/3/2023  Authorization Period Expiration: 12/31/23  plan of care: 7/1/23 to 8/11/23  Visit # / Visits authorized: 16/24     PN due: 8/11/23    Time In: 1345  Time Out: 1430  Total Billable Time: 45 minutes     Precautions: Standard and LVAD    Subjective     Tim reports: no new reports, he is feeling much better than last session.   He was compliant with home exercise program.  Response to previous treatment: worn out  Functional change: can carry groceries up the stairs    Pain: No significant pain reported  Location: NA     Objective     Treatment modified today (7/20/23) due to report above    Tim received therapeutic exercises to develop strength, endurance, and flexibility for 40 minutes including:    Recumbent stepper bilateral upper extremity/ lower extremity L4 x 9 mins, for cardiovascular endurance and strength    Matrix:   hip abduction, 35#; 3 x 8  hamstring curls 25# 3 x 8  Knee extension 15# 3 x 8     upper extremity ergometer  L1 x 5 mins      Tim participated in neuromuscular re-education activities to improve: Balance for 5 minutes. The following activities were included:       Narrow base of support on foam with eyes closed x 30 sec- minimal sway  Narrow base of support on foam head turns x 30 sec- minimal sway  Semi tandem, foam 2 x 30 sec- moderate sway    Home Exercises Provided and Patient Education Provided     Education provided:   - continue with previous home exercise program        Written Home Exercises Provided: Patient  instructed to cont prior HEP.   Exercises were reviewed and Tim was able to demonstrate them prior to the end of the session.  Tim demonstrated good  understanding of the education provided.     See EMR under Patient Instructions for exercises provided prior visit.    Assessment     Mr. Richards tolerated session well. Short rest breaks were provided between exercises as need to address shortness of breath. Improved endurance noted with patient tolerating increased duration on recumbent stepper. Patient demonstrated improved balance in narrow base of support on compliant surfaces. He demonstrated a significant challenge with balance on compliant surface in semi tandem due to reduced accuracy of ankle strategy. Patient can benefit from continued skilled physical therapy to improve activity tolerance for daily mobility/ tasks.     Tim Is progressing well towards his goals.   Pt prognosis is Good.     Pt will continue to benefit from skilled outpatient physical therapy to address the deficits listed in the problem list box on initial evaluation, provide pt/family education and to maximize pt's level of independence in the home and community environment.     Pt's spiritual, cultural and educational needs considered and pt agreeable to plan of care and goals.     Anticipated barriers to physical therapy: longstanding history of cardiac diagnoses    Goals:   Status as of 7/11/23  Short Term Goals: 5 weeks   Patient will report performance of home exercise program for strengthening 3x/ week to improve fitness and activity tolerance. Met 5/30/23  Patient will demonstrate improved confidence in mobility by stating he feels 70% confident in balance while walking across a parking lot. Improved- 50%  Patient will tolerate 10 consecutive minutes on cardiovascular equipment at low resistance to demonstrate improved activity tolerance. Improved- 7 mins  Patient will demonstrate a gross improvement in lower extremity strength  by performing 5 times sit<>stand test in 20 sec.  ongoing     Long Term Goals: 10 weeks   Patient will demonstrate improved confidence in mobility by stating he feel 100% confident in balance while walking across a parking lot. ongoing  Patient will tolerate 10 consecutive minutes on cardiovascular equipment at moderate resistance to demonstrate improved activity tolerance. improved  Patient will demonstrate a gross improvement in lower extremity strength and decreased fall risk by performing 5 times sit<>stand test in 15 sec.  ongoing  Patient will demonstrate a significant change in gait tolerance for walking to his mailbox by ambulating 413 ft in 2 minutes.  improved- 355 ft     Plan     Outpatient Physical Therapy 2 times weekly to include the following interventions: Moist Heat/ Ice, Neuromuscular Re-ed, Patient Education, Therapeutic Activities, and Therapeutic Exercise.     Continue with circuit training. Increased duration of CV equipment to 10 minutes as tolerated. May add kettle aguilar carry.        Emma Curtis, PT, DPT,   Board-Certified Clinical Specialist in Neurologic Physical Therapy   Certified Brain Injury Specialist      7/25/2023

## 2023-07-25 NOTE — PROGRESS NOTES
[facility]    2023 8:12 AM    Assessment/Plan:    Patient presents today with therapeutic INR.    Assessment of patient findings and chart review: no significant findings     Recommendation for patient's warfarin regimen: Continue current maintenance dose    Recommend repeat INR in 1 week  _________________________________________________________________    Tim Richards (57 y.o.) is followed by the [facility].    Anticoagulation Summary  As of 2023      INR goal:  2.0-3.0   TTR:  70.0 % (4.8 y)   INR used for dosin.4 (2023)   Warfarin maintenance plan:  5 mg (5 mg x 1) every day   Weekly warfarin total:  35 mg   Plan last modified:  Pearl Mendez, PharmD (2023)   Next INR check:  2023   Target end date:      Indications    LVAD (left ventricular assist device) present (Resolved) [Z95.811]  Anticoagulation monitoring  INR range 2-3 (Resolved) [Z79.01]                 Anticoagulation Episode Summary       INR check location:  Clinic Lab    Preferred lab:      Send INR reminders to:  [facility] COUMADIN LVAD    Comments:  LVAD// [facility]//Lab [place] Results: [phone] Acct# [id number] Phone: [phone]//[address] [facility] -[phone], fax [phone] //pt declined meter, see 10/11/22 & 22 notes// Bridge:NO (h/o bleeds)          Anticoagulation Care Providers       Provider Role Specialty Phone number    Antonio Hadley Jr., MD Responsible Transplant [phone]

## 2023-07-25 NOTE — TELEPHONE ENCOUNTER
Message received by Dr. Barr: After reviewing his echo images from yesterday, I recommend to decrease his LVAD speed by 200 rpm. From 6400 to 6200 rpm. At current speed, the vacuum is clearly dragging the IV septum and impairing RV function. AV does not open. The CVP is not really 15 by echo images. Is 8 mmHg. Repeat echo in few weeks to determine if that sufficed.     Called pt to discuss with him.  He will come to clinic this week for speed change. Will need echo scheduled after speed change.

## 2023-07-25 NOTE — PROGRESS NOTES
"Patient completed a routine echo, he was supposed to go for a RHC as well as this echo but refused that because "He feels like it is being forced upon him so that [facility] can make more money. He wants to exhaust all other options before going under the knife." His baseline Cr is normal but now considerably higher and increase has occurred after last echo Nov 2022.  His last RHC was 2018. Will you please review and let us know if there are any interventions that can be done?"

## 2023-07-26 ENCOUNTER — DOCUMENTATION ONLY (OUTPATIENT)
Dept: TRANSPLANT | Facility: CLINIC | Age: 58
End: 2023-07-26

## 2023-07-26 ENCOUNTER — CLINICAL SUPPORT (OUTPATIENT)
Dept: TRANSPLANT | Facility: CLINIC | Age: 58
End: 2023-07-26
Payer: MEDICARE

## 2023-07-26 NOTE — PROGRESS NOTES
Received secure chat from Dr. Perez in ID:   Patient has no showed or canceled a few follow visits with me (at  and at [facility]) - last message I received from the coordinators was that he stopped his antifungals for unclear reason (not from me!)    Due to patient's non-compliance, no-shows and cancellations, I OK'd for patient to not be rescheduled with ID.   ID will need to be re-consulted if patient presents with fever, drainage, etc.   I also updated patient's snapshot with this information.

## 2023-07-26 NOTE — PROGRESS NOTES
Patient seen for decrease of VAD speed. Doppler 68/0. Speed decreased to 6200 and LSL 5800. Flows remained 5.4-5.5, reports feeling well, repeat echo scheduled 8/18/23. Instructed patient to call with any symptoms. Verbalized understanding.

## 2023-07-27 ENCOUNTER — CLINICAL SUPPORT (OUTPATIENT)
Dept: REHABILITATION | Facility: HOSPITAL | Age: 58
End: 2023-07-27
Payer: MEDICARE

## 2023-07-27 DIAGNOSIS — R53.1 DECREASED STRENGTH: ICD-10-CM

## 2023-07-27 DIAGNOSIS — R68.89 DECREASED FUNCTIONAL ACTIVITY TOLERANCE: Primary | ICD-10-CM

## 2023-07-27 PROCEDURE — 97110 THERAPEUTIC EXERCISES: CPT | Mod: PN

## 2023-07-27 PROCEDURE — 97112 NEUROMUSCULAR REEDUCATION: CPT | Mod: PN

## 2023-07-27 NOTE — PROGRESS NOTES
Physical Therapy Daily Treatment Note     Name: Tim Somers [place]  Clinic Number: [id number]    Therapy Diagnosis:   Encounter Diagnoses   Name Primary?    Decreased functional activity tolerance Yes    Decreased strength        Physician: Antonio Hadley Jr*    Visit Date: 7/27/2023    Physician Orders: PT Eval and Treat   Medical Diagnosis from Referral:   R53.81 (ICD-10-CM) - Physical debility   R26.89 (ICD-10-CM) - Poor balance      Evaluation Date: 5/3/2023  Authorization Period Expiration: 12/31/23  plan of care: 7/1/23 to 8/11/23  Visit # / Visits authorized: 17/24     PN due: 8/11/23    Time In: 1:38  Time Out: 2:23  Total Billable Time:  40 minutes (45 total)    Precautions: Standard and LVAD    Subjective   Tim reports that nurse home-visit yesterday included decrease of LVAD speed.    He was compliant with home exercise program.  Response to previous treatment: worn out  Functional change: can carry groceries up the stairs    Pain: No significant pain reported  Location: NA     Objective       Tim received therapeutic exercises to develop strength, endurance, and flexibility for 25 minutes including:    Recumbent stepper bilateral upper extremity/ lower extremity L4 x 7 minutes, for cardiovascular endurance and strength    Matrix:   hip abduction, 30#; 3 x 8  hamstring curls 25#, 3 x 8    (Frequent rest breaks provided throughout)    Tim participated in neuromuscular re-education activities to improve: Balance for 15 minutes. The following activities were included:    Narrow base of support on foam with eyes closed    -x 30 seconds    -x 1 minute    -x 1 minute     Semi tandem, foam 4 x 20 sec- moderate sway    (Frequent rest breaks provided throughout)    Home Exercises Provided and Patient Education Provided     Education provided:   - continue with previous home exercise program        Written Home Exercises Provided: Patient instructed to cont prior HEP.   Exercises were reviewed and  "Tim was able to demonstrate them prior to the end of the session.  Tim demonstrated good  understanding of the education provided.     See EMR under Patient Instructions for exercises provided prior visit.    Assessment     Mr. Richards tolerated session well. He stopped himself on recumbent bike after 7 minutes due to fatigue. (Unable to reach 9 minutes from last session- patient attributed this to LVAD speed reduction and reported, "They said they're not sure how this will affect me". Patient completed strengthening machines with better tolerance compared to cardio. Balance activities completed well. Patient was appropriately challenged by staggered stance with eyes closed on foam; but he did demonstrate motor performance improvement with this as practice progressed. He responded well to cues for recovering postural control without opening eyes, and was able to reduce sway with practice; but LE fatigue reported and demonstrated by end of this activity. Patient can benefit from continued skilled physical therapy to improve activity tolerance for daily mobility/ tasks.     Tim Is progressing well towards his goals.   Pt prognosis is Good.     Pt will continue to benefit from skilled outpatient physical therapy to address the deficits listed in the problem list box on initial evaluation, provide pt/family education and to maximize pt's level of independence in the home and community environment.     Pt's spiritual, cultural and educational needs considered and pt agreeable to plan of care and goals.     Anticipated barriers to physical therapy: longstanding history of cardiac diagnoses    Goals:   Status as of 7/11/23  Short Term Goals: 5 weeks   Patient will report performance of home exercise program for strengthening 3x/ week to improve fitness and activity tolerance. Met 5/30/23  Patient will demonstrate improved confidence in mobility by stating he feels 70% confident in balance while walking across a " parking lot. Improved- 50%  Patient will tolerate 10 consecutive minutes on cardiovascular equipment at low resistance to demonstrate improved activity tolerance. Improved- 7 mins  Patient will demonstrate a gross improvement in lower extremity strength by performing 5 times sit<>stand test in 20 sec.  ongoing     Long Term Goals: 10 weeks   Patient will demonstrate improved confidence in mobility by stating he feel 100% confident in balance while walking across a parking lot. ongoing  Patient will tolerate 10 consecutive minutes on cardiovascular equipment at moderate resistance to demonstrate improved activity tolerance. improved  Patient will demonstrate a gross improvement in lower extremity strength and decreased fall risk by performing 5 times sit<>stand test in 15 sec.  ongoing  Patient will demonstrate a significant change in gait tolerance for walking to his mailbox by ambulating 413 ft in 2 minutes.  improved- 355 ft     Plan     Outpatient Physical Therapy 2 times weekly to include the following interventions: Moist Heat/ Ice, Neuromuscular Re-ed, Patient Education, Therapeutic Activities, and Therapeutic Exercise.     Continue with circuit training. Increased duration of CV equipment to 10 minutes as tolerated. May add kettle aguilar carry.        Veronica Colon, PT, DPT    7/27/2023

## 2023-08-01 ENCOUNTER — CLINICAL SUPPORT (OUTPATIENT)
Dept: REHABILITATION | Facility: HOSPITAL | Age: 58
End: 2023-08-01
Payer: MEDICARE

## 2023-08-01 DIAGNOSIS — R53.1 DECREASED STRENGTH: ICD-10-CM

## 2023-08-01 DIAGNOSIS — R68.89 DECREASED FUNCTIONAL ACTIVITY TOLERANCE: Primary | ICD-10-CM

## 2023-08-01 PROCEDURE — 97110 THERAPEUTIC EXERCISES: CPT | Mod: PN | Performed by: PHYSICAL THERAPIST

## 2023-08-01 PROCEDURE — 97112 NEUROMUSCULAR REEDUCATION: CPT | Mod: PN | Performed by: PHYSICAL THERAPIST

## 2023-08-01 NOTE — PROGRESS NOTES
8/01/23 Patient called to report he went to lab 7/31 but was told appointment was for 8/03/23, appointment had been rescheduled by another department, Patient said he will go Thursday 8/03/23

## 2023-08-01 NOTE — PROGRESS NOTES
Physical Therapy Daily Treatment Note     Name: Tim Richards  Clinic Number: [id number]    Therapy Diagnosis:   Encounter Diagnoses   Name Primary?    Decreased functional activity tolerance Yes    Decreased strength        Physician: Antonio Hadley Jr*    Visit Date: 8/1/2023    Physician Orders: PT Eval and Treat   Medical Diagnosis from Referral:   R53.81 (ICD-10-CM) - Physical debility   R26.89 (ICD-10-CM) - Poor balance      Evaluation Date: 5/3/2023  Authorization Period Expiration: 12/31/23  plan of care: 7/1/23 to 8/11/23  Visit # / Visits authorized: 18/24     PN due: 8/11/23  Time In: 1302   Time Out: 1345  Total Billable Time:  28 minutes   Total treatment time: 43 minutes    Precautions: Standard and LVAD    Subjective   Tim reports:  no new reports  He was compliant with home exercise program.  Response to previous treatment: tired  Functional change: ongoing  Pain: No significant pain reported  Location: NA     Objective       Tim received therapeutic exercises to develop strength, endurance, and flexibility for 33 minutes including:    Recumbent stepper bilateral upper extremity/ lower extremity L4 x 9 mins, for cardiovascular endurance and strength     Gait with 2- 5# kettle bells ~200 ft x 2    Matrix:   hip abduction, 30#; 2 x 8, 35# 1 x 8     upper extremity ergometer  L1 x 5 mins    Sitting: hamstring stretch 2 x 30 sec      Tim participated in neuromuscular re-education activities to improve: Balance for 10 minutes. The following activities were included:    Parallel bar:   Semi tandem, foam 2 x 30 sec- moderate sway  Semi tandem stance, foam, head turns x 30 sec- moderate sway, intermittent upper extremity support    (Frequent rest breaks provided throughout)    Home Exercises Provided and Patient Education Provided     Education provided:   - continue with previous home exercise program        Written Home Exercises Provided: Patient instructed to cont prior HEP.    Exercises were reviewed and Tim was able to demonstrate them prior to the end of the session.  Tim demonstrated good  understanding of the education provided.     See EMR under Patient Instructions for exercises provided prior visit.    Assessment     Tim tolerated session well. He was able to resume prior level of function since having his LVAD turned down. Patient reported a good challenge and bilateral quad fatigue with addition of kettle bell carry. a good challenge of balance was noted in semi tandem stance. Patient required intermittent upper extremity support with addition of head turns in this position.  Patient can benefit from continued skilled physical therapy to improve activity tolerance for daily mobility/ tasks.     Tim Is progressing well towards his goals.   Pt prognosis is Good.     Pt will continue to benefit from skilled outpatient physical therapy to address the deficits listed in the problem list box on initial evaluation, provide pt/family education and to maximize pt's level of independence in the home and community environment.     Pt's spiritual, cultural and educational needs considered and pt agreeable to plan of care and goals.     Anticipated barriers to physical therapy: longstanding history of cardiac diagnoses    Goals:   Status as of 7/11/23  Short Term Goals: 5 weeks   Patient will report performance of home exercise program for strengthening 3x/ week to improve fitness and activity tolerance. Met 5/30/23  Patient will demonstrate improved confidence in mobility by stating he feels 70% confident in balance while walking across a parking lot. Improved- 50%  Patient will tolerate 10 consecutive minutes on cardiovascular equipment at low resistance to demonstrate improved activity tolerance. Improved- 7 mins  Patient will demonstrate a gross improvement in lower extremity strength by performing 5 times sit<>stand test in 20 sec.  ongoing     Long Term Goals: 10 weeks    Patient will demonstrate improved confidence in mobility by stating he feel 100% confident in balance while walking across a parking lot. ongoing  Patient will tolerate 10 consecutive minutes on cardiovascular equipment at moderate resistance to demonstrate improved activity tolerance. improved  Patient will demonstrate a gross improvement in lower extremity strength and decreased fall risk by performing 5 times sit<>stand test in 15 sec.  ongoing  Patient will demonstrate a significant change in gait tolerance for walking to his mailbox by ambulating 413 ft in 2 minutes.  improved- 355 ft     Plan     Outpatient Physical Therapy 2 times weekly to include the following interventions: Moist Heat/ Ice, Neuromuscular Re-ed, Patient Education, Therapeutic Activities, and Therapeutic Exercise.     Continue with circuit training. Increased duration of CV equipment to 10 minutes as tolerated.        Emma Curtis, PT, DPT,   Board-Certified Clinical Specialist in Neurologic Physical Therapy   Certified Brain Injury Specialist    8/1/2023

## 2023-08-03 ENCOUNTER — ANTI-COAG VISIT (OUTPATIENT)
Dept: CARDIOLOGY | Facility: CLINIC | Age: 58
End: 2023-08-03
Payer: MEDICARE

## 2023-08-03 ENCOUNTER — CLINICAL SUPPORT (OUTPATIENT)
Dept: CARDIOLOGY | Facility: HOSPITAL | Age: 58
End: 2023-08-03
Attending: INTERNAL MEDICINE
Payer: MEDICARE

## 2023-08-03 DIAGNOSIS — I42.8 NICM (NONISCHEMIC CARDIOMYOPATHY): ICD-10-CM

## 2023-08-03 PROCEDURE — 93793 PR ANTICOAGULANT MGMT FOR PT TAKING WARFARIN: ICD-10-PCS | Mod: S$GLB,,,

## 2023-08-03 PROCEDURE — 93260 PRGRMG DEV EVAL IMPLTBL SYS: CPT | Mod: 26,,, | Performed by: INTERNAL MEDICINE

## 2023-08-03 PROCEDURE — 93260 CARDIAC DEVICE CHECK - IN CLINIC & HOSPITAL: ICD-10-PCS | Mod: 26,,, | Performed by: INTERNAL MEDICINE

## 2023-08-03 PROCEDURE — 93793 ANTICOAG MGMT PT WARFARIN: CPT | Mod: S$GLB,,,

## 2023-08-03 PROCEDURE — 93282 PRGRMG EVAL IMPLANTABLE DFB: CPT

## 2023-08-03 NOTE — PROGRESS NOTES
[facility]    2023 12:52 PM    Assessment/Plan:    Patient presents today with therapeutic INR.    Assessment of patient findings and chart review: no significant findings     Recommendation for patient's warfarin regimen: Continue current maintenance dose    Recommend repeat INR in 1 week  _________________________________________________________________    Tim Richards (57 y.o.) is followed by the [facility].    Anticoagulation Summary  As of 8/3/2023      INR goal:  2.0-3.0   TTR:  70.2 % (4.8 y)   INR used for dosin.3 (8/3/2023)   Warfarin maintenance plan:  5 mg (5 mg x 1) every day   Weekly warfarin total:  35 mg   Plan last modified:  Pearl Mendez, PharmD (2023)   Next INR check:  8/10/2023   Target end date:      Indications    LVAD (left ventricular assist device) present (Resolved) [Z95.811]  Anticoagulation monitoring  INR range 2-3 (Resolved) [Z79.01]                 Anticoagulation Episode Summary       INR check location:  Clinic Lab    Preferred lab:      Send INR reminders to:  [facility] LVAD    Comments:  LVAD// [facility]//Lab [place] Results: [id number] Acct# [id number] Phone: [phone]//dc 23 [facility] -[phone], fax [phone] //pt declined meter, see 10/11/22 & 22 notes// Bridge:NO (h/o bleeds)          Anticoagulation Care Providers       Provider Role Specialty Phone number    Antonio Hadley Jr., MD Responsible Transplant [phone]

## 2023-08-08 ENCOUNTER — LAB VISIT (OUTPATIENT)
Dept: LAB | Facility: HOSPITAL | Age: 58
End: 2023-08-08
Attending: INTERNAL MEDICINE
Payer: MEDICARE

## 2023-08-08 ENCOUNTER — ANTI-COAG VISIT (OUTPATIENT)
Dept: CARDIOLOGY | Facility: CLINIC | Age: 58
End: 2023-08-08
Payer: MEDICARE

## 2023-08-08 ENCOUNTER — TELEPHONE (OUTPATIENT)
Dept: REHABILITATION | Facility: HOSPITAL | Age: 58
End: 2023-08-08

## 2023-08-08 ENCOUNTER — CLINICAL SUPPORT (OUTPATIENT)
Dept: REHABILITATION | Facility: HOSPITAL | Age: 58
End: 2023-08-08
Payer: MEDICARE

## 2023-08-08 DIAGNOSIS — Z95.811 HEART REPLACED BY HEART ASSIST DEVICE: ICD-10-CM

## 2023-08-08 DIAGNOSIS — R68.89 DECREASED FUNCTIONAL ACTIVITY TOLERANCE: Primary | ICD-10-CM

## 2023-08-08 DIAGNOSIS — R53.1 DECREASED STRENGTH: ICD-10-CM

## 2023-08-08 LAB
INR PPP: 1.9 (ref 0.8–1.2)
PROTHROMBIN TIME: 19.2 SEC (ref 9–12.5)

## 2023-08-08 PROCEDURE — 93793 ANTICOAG MGMT PT WARFARIN: CPT | Mod: S$GLB,,,

## 2023-08-08 PROCEDURE — 36415 COLL VENOUS BLD VENIPUNCTURE: CPT | Performed by: INTERNAL MEDICINE

## 2023-08-08 PROCEDURE — 93793 PR ANTICOAGULANT MGMT FOR PT TAKING WARFARIN: ICD-10-PCS | Mod: S$GLB,,,

## 2023-08-08 PROCEDURE — 97110 THERAPEUTIC EXERCISES: CPT | Mod: PN

## 2023-08-08 PROCEDURE — 85610 PROTHROMBIN TIME: CPT | Performed by: INTERNAL MEDICINE

## 2023-08-08 PROCEDURE — 97112 NEUROMUSCULAR REEDUCATION: CPT | Mod: PN

## 2023-08-08 NOTE — PROGRESS NOTES
Patient reports correct Warfarin dose, was constipated on 8/7/2023, stated his wife gave him some of her liquid colonoscopy prep for him to have a bowel movement and it caused diarrhea, had a Protein Power Shake before INR lab this afternoon, no other changes reported.

## 2023-08-08 NOTE — PROGRESS NOTES
Physical Therapy Daily Treatment Note     Name: Tim Somers [place]  Clinic Number: [id number]    Therapy Diagnosis:   Encounter Diagnoses   Name Primary?    Decreased functional activity tolerance Yes    Decreased strength        Physician: Antonio Hadley Jr*    Visit Date: 8/8/2023    Physician Orders: PT Eval and Treat   Medical Diagnosis from Referral:   R53.81 (ICD-10-CM) - Physical debility   R26.89 (ICD-10-CM) - Poor balance      Evaluation Date: 5/3/2023  Authorization Period Expiration: 12/31/23  plan of care: 7/1/23 to 8/11/23  Visit # / Visits authorized: 19/24     PN due: 8/11/23  Time In: 1:00  Time Out: 1:40  Total Billable Time:  40 minutes (38 billable)    Precautions: Standard and LVAD    Subjective   Tim reports no new updates  He was compliant with home exercise program.  Response to previous treatment: tired  Functional change: ongoing  Pain: No significant pain reported  Location: NA     Objective       Tim received therapeutic exercises to develop strength, endurance, and flexibility for 30 minutes including:    Recumbent stepper bilateral upper extremity/ lower extremity L4 x 9 mins, for cardiovascular endurance and strength     Matrix:   -hip abduction; 35# 3 x 8  -knee extension     -15# x 10     -5# with 4-second hold at end-range x 10       Tim participated in neuromuscular re-education activities to improve: Balance for 8 minutes. The following activities were included:    Parallel bar:   Semi tandem, foam 3 x 20 sec- moderate sway  Semi tandem stance, foam, head turns x 2 x 20 sec- moderate sway, intermittent upper extremity support    (Frequent rest breaks provided throughout)    Home Exercises Provided and Patient Education Provided     Education provided:   - continue with previous home exercise program        Written Home Exercises Provided: Patient instructed to cont prior HEP.   Exercises were reviewed and Tim was able to demonstrate them prior to the end of the  session.  Tim demonstrated good  understanding of the education provided.     See EMR under Patient Instructions for exercises provided prior visit.    Assessment     Mr. Richards tolerated session well overall. Fatigue set in quickly after 2nd set of 8 reps with Matrix machine, in beginning portion of session. Extended rest break facilitated recovery. Patient was challenged by muscle endurance element added to strengthening activity, but still completed and tolerated well with low duration and frequent rest breaks. He completed balance challenges well though tired by end of session. Patient remains appropriate for skilled physical therapy.    Tim Is progressing well towards his goals.   Pt prognosis is Good.     Pt will continue to benefit from skilled outpatient physical therapy to address the deficits listed in the problem list box on initial evaluation, provide pt/family education and to maximize pt's level of independence in the home and community environment.     Pt's spiritual, cultural and educational needs considered and pt agreeable to plan of care and goals.     Anticipated barriers to physical therapy: longstanding history of cardiac diagnoses    Goals:   Status as of 7/11/23  Short Term Goals: 5 weeks   Patient will report performance of home exercise program for strengthening 3x/ week to improve fitness and activity tolerance. Met 5/30/23  Patient will demonstrate improved confidence in mobility by stating he feels 70% confident in balance while walking across a parking lot. Improved- 50%  Patient will tolerate 10 consecutive minutes on cardiovascular equipment at low resistance to demonstrate improved activity tolerance. Improved- 7 mins  Patient will demonstrate a gross improvement in lower extremity strength by performing 5 times sit<>stand test in 20 sec.  ongoing     Long Term Goals: 10 weeks   Patient will demonstrate improved confidence in mobility by stating he feel 100% confident in balance  while walking across a parking lot. ongoing  Patient will tolerate 10 consecutive minutes on cardiovascular equipment at moderate resistance to demonstrate improved activity tolerance. improved  Patient will demonstrate a gross improvement in lower extremity strength and decreased fall risk by performing 5 times sit<>stand test in 15 sec.  ongoing  Patient will demonstrate a significant change in gait tolerance for walking to his mailbox by ambulating 413 ft in 2 minutes.  improved- 355 ft     Plan     Outpatient Physical Therapy 2 times weekly to include the following interventions: Moist Heat/ Ice, Neuromuscular Re-ed, Patient Education, Therapeutic Activities, and Therapeutic Exercise.     Continue with circuit training. Increased duration of CV equipment to 10 minutes as tolerated.        Veronica Colon, PT, DPT    8/8/2023

## 2023-08-08 NOTE — PROGRESS NOTES
[facility]    2023 3:46 PM    Assessment/Plan:    Patient presents today with subtherapeutic  INR.    Assessment of patient findings and chart review: no significant findings     Recommendation for patient's warfarin regimen: Boost dose today to 7.5mg then resume current maintenance dose    Recommend repeat INR in 1 week  _________________________________________________________________    Tim Richards (57 y.o.) is followed by the [facility].    Anticoagulation Summary  As of 2023      INR goal:  2.0-3.0   TTR:  70.2 % (4.8 y)   INR used for dosin.9 (2023)   Warfarin maintenance plan:  5 mg (5 mg x 1) every day   Weekly warfarin total:  35 mg   Plan last modified:  Pearl Mendez, PharmD (2023)   Next INR check:  8/15/2023   Target end date:      Indications    LVAD (left ventricular assist device) present (Resolved) [Z95.811]  Anticoagulation monitoring  INR range 2-3 (Resolved) [Z79.01]                 Anticoagulation Episode Summary       INR check location:  Clinic Lab    Preferred lab:      Send INR reminders to:  [facility] LV[facility]    Comments:  LVAD// [facility]//Lab [place] Results: [phone] Located within [facility]# [id number] Phone: [phone] [phone]//dc [facility] [phone], fax [phone] //pt declined meter, see 10/11/22 & 22 notes// Bridge:NO (h/o bleeds)          Anticoagulation Care Providers       Provider Role Specialty Phone number    Antonio Hadley Jr., MD Responsible Transplant [phone]

## 2023-08-11 ENCOUNTER — CLINICAL SUPPORT (OUTPATIENT)
Dept: REHABILITATION | Facility: HOSPITAL | Age: 58
End: 2023-08-11
Payer: MEDICARE

## 2023-08-11 DIAGNOSIS — R68.89 DECREASED FUNCTIONAL ACTIVITY TOLERANCE: Primary | ICD-10-CM

## 2023-08-11 DIAGNOSIS — R53.1 DECREASED STRENGTH: ICD-10-CM

## 2023-08-11 PROCEDURE — 97112 NEUROMUSCULAR REEDUCATION: CPT | Mod: PN

## 2023-08-11 PROCEDURE — 97110 THERAPEUTIC EXERCISES: CPT | Mod: PN

## 2023-08-11 NOTE — PROGRESS NOTES
Physical Therapy Daily Treatment Note     Name: Tim Somers [place]  Clinic Number: [id number]    Therapy Diagnosis:   Encounter Diagnoses   Name Primary?    Decreased functional activity tolerance Yes    Decreased strength        Physician: Antonio Hadley Jr*    Visit Date: 2023    Physician Orders: PT Eval and Treat   Medical Diagnosis from Referral:   R53.81 (ICD-10-CM) - Physical debility   R26.89 (ICD-10-CM) - Poor balance      Evaluation Date: 5/3/2023  Authorization Period Expiration: 23  plan of care: 23 to 23  Visit # / Visits authorized:     Time In: 1:02  Time Out: 1:50  Total Billable Time:  48  minutes     Precautions: Standard and LVAD    Subjective   Tim reports he is ready for discharge today. Was rushing to get here so slightly out of breath.     He was compliant with home exercise program.  Response to previous treatment:  slight quad soreness.   Functional change: improved activity tolerance   Pain: No significant pain reported  Location: NA     Objective       Tim received therapeutic exercises to develop strength, endurance, and flexibility for 18 minutes includinx sit<>stands: 11s with no upper extremity assist      2 min walk test: 355 ft max - 330 ft today      CV endurance: 10 minutes achieved.      Recumbent stepper bilateral upper extremity/ lower extremity L4 x 10 mins, for cardiovascular endurance and strength  -mod short of breath after         Tim participated in neuromuscular re-education activities to improve: Balance for 30 minutes. The following activities were included:    At rail but unsupported for all:   Tandem 2 x 30s bilateral   Tandem with vertical head turns x30s bilateral   Airex with narrow base of support and vertical and horizontal head turns x 30s each   Airex eyes closed 3 x 15s   Heel rises 2 x 20 unsupported   Toes rises 2 x 15 unsupported     (rest breaks provided throughout)      CMS Impairment/Limitation/Restriction  for FOTO Survey    Therapist reviewed FOTO scores for Tim Richards on 8/11/2023.   FOTO documents entered into [employer] - see Media section.    FOTO Score: 51%              Home Exercises Provided and Patient Education Provided     Education provided:   - continue with previous home exercise program          Written Home Exercises Provided: Patient instructed to cont prior HEP.   Exercises were reviewed and Tim was able to demonstrate them prior to the end of the session.  Tim demonstrated good  understanding of the education provided.     See EMR under Patient Instructions for exercises provided prior visit.    PHYSICAL THERAPY ASSESSMENT & DISCHARGE SUMMARY     Mr. Richards did very well with final visit today. He met his goal for cardiovascular endurance/activity tolerance of 10 continuous minutes on stepper. He met his goal for 5x chair rise score with a score of 11s today placing him within normal limits of healthy population for that measure. He was slightly short on 2 min walk test goal but has made improvements overall. He reports he feels much stronger and has not had a fall in 3+ weeks. Time spent at end of session educating him on importance of continuing home exercise program and general activity to prevent decline after discharge. We discussed cardiovascular options to continue activity including walking program, gym based stationary bikes or home based cardiovascular equipment. Also encouraged using activity trackers already in place on watch/phone to ensure he is staying active. This plan of care (POC) is now discharged.     Status Towards Goals:    Short Term Goals: 5 weeks   Patient will report performance of home exercise program for strengthening 3x/ week to improve fitness and activity tolerance. Met 5/30/23  Patient will demonstrate improved confidence in mobility by stating he feels 70% confident in balance while walking across a parking lot. MET 8/11/23  Patient will tolerate 10  consecutive minutes on cardiovascular equipment at low resistance to demonstrate improved activity tolerance.  MET 8/11/23  Patient will demonstrate a gross improvement in lower extremity strength by performing 5 times sit<>stand test in 20 sec.  MET 8/11/23     Long Term Goals: 10 weeks   Patient will demonstrate improved confidence in mobility by stating he feel 100% confident in balance while walking across a parking lot. Near met, improved from 30% to 70%.   Patient will tolerate 10 consecutive minutes on cardiovascular equipment at moderate resistance to demonstrate improved activity tolerance. MET 8/11/23  Patient will demonstrate a gross improvement in lower extremity strength and decreased fall risk by performing 5 times sit<>stand test in 15 sec.   MET 8/11/23  Patient will demonstrate a significant change in gait tolerance for walking to his mailbox by ambulating 413 ft in 2 minutes.  Near met - 355 ft max      Discharge reason : Patient has maximized benefit from PT at this time    PLAN   This patient is discharged from Outpatient Physical Therapy Services.     María Lentz, PT  08/11/2023

## 2023-08-18 ENCOUNTER — HOSPITAL ENCOUNTER (OUTPATIENT)
Dept: CARDIOLOGY | Facility: HOSPITAL | Age: 58
Discharge: HOME | End: 2023-08-18
Attending: INTERNAL MEDICINE
Payer: MEDICARE

## 2023-08-18 ENCOUNTER — ANTI-COAG VISIT (OUTPATIENT)
Dept: CARDIOLOGY | Facility: CLINIC | Age: 58
End: 2023-08-18
Payer: MEDICARE

## 2023-08-18 VITALS — BODY MASS INDEX: 29.03 KG/M2 | WEIGHT: 219 LBS | HEART RATE: 71 BPM | HEIGHT: 73 IN

## 2023-08-18 DIAGNOSIS — Z95.811 HEART REPLACED BY HEART ASSIST DEVICE: ICD-10-CM

## 2023-08-18 LAB
ASCENDING AORTA: 3.17 CM
BSA FOR ECHO PROCEDURE: 2.26 M2
CV ECHO LV RWT: 0.2 CM
DOP CALC LVOT AREA: 4.4 CM2
DOP CALC LVOT DIAMETER: 2.38 CM
E/E' RATIO: 16.44 M/S
ECHO LV POSTERIOR WALL: 0.81 CM (ref 0.6–1.1)
FRACTIONAL SHORTENING: 5 % (ref 28–44)
INTERVENTRICULAR SEPTUM: 0.36 CM (ref 0.6–1.1)
IVRT: 156.99 MSEC
LA MAJOR: 7.34 CM
LA MINOR: 7.3 CM
LA WIDTH: 4.29 CM
LEFT ATRIUM SIZE: 3.84 CM
LEFT ATRIUM VOLUME INDEX MOD: 34.8 ML/M2
LEFT ATRIUM VOLUME INDEX: 45.8 ML/M2
LEFT ATRIUM VOLUME MOD: 77.88 CM3
LEFT ATRIUM VOLUME: 102.5 CM3
LEFT INTERNAL DIMENSION IN SYSTOLE: 7.86 CM (ref 2.1–4)
LEFT VENTRICLE DIASTOLIC VOLUME INDEX: 165.91 ML/M2
LEFT VENTRICLE DIASTOLIC VOLUME: 371.64 ML
LEFT VENTRICLE MASS INDEX: 103 G/M2
LEFT VENTRICLE SYSTOLIC VOLUME INDEX: 148.1 ML/M2
LEFT VENTRICLE SYSTOLIC VOLUME: 331.73 ML
LEFT VENTRICULAR INTERNAL DIMENSION IN DIASTOLE: 8.28 CM (ref 3.5–6)
LEFT VENTRICULAR MASS: 230.44 G
LV LATERAL E/E' RATIO: 18.5 M/S
LV SEPTAL E/E' RATIO: 14.8 M/S
LVAD BASE RATE: 6200 RPM
MV A" WAVE DURATION": 9.13 MSEC
MV PEAK E VEL: 0.74 M/S
PISA TR MAX VEL: 2.82 M/S
PULM VEIN S/D RATIO: 1.4
PV PEAK D VEL: 0.3 M/S
PV PEAK S VEL: 0.42 M/S
RA MAJOR: 6.56 CM
RA PRESSURE ESTIMATED: 8 MMHG
RA WIDTH: 5.46 CM
RIGHT VENTRICULAR END-DIASTOLIC DIMENSION: 4.59 CM
RV TB RVSP: 11 MMHG
SINUS: 2.92 CM
STJ: 2.69 CM
TDI LATERAL: 0.04 M/S
TDI SEPTAL: 0.05 M/S
TDI: 0.05 M/S
TR MAX PG: 32 MMHG
TRICUSPID ANNULAR PLANE SYSTOLIC EXCURSION: 1.47 CM
TV REST PULMONARY ARTERY PRESSURE: 40 MMHG
Z-SCORE OF LEFT VENTRICULAR DIMENSION IN END DIASTOLE: 0.33
Z-SCORE OF LEFT VENTRICULAR DIMENSION IN END SYSTOLE: 3.81

## 2023-08-18 PROCEDURE — 93306 TTE W/DOPPLER COMPLETE: CPT | Mod: 26,,, | Performed by: INTERNAL MEDICINE

## 2023-08-18 PROCEDURE — 93793 PR ANTICOAGULANT MGMT FOR PT TAKING WARFARIN: ICD-10-PCS | Mod: S$GLB,,,

## 2023-08-18 PROCEDURE — 93306 ECHO (CUPID ONLY): ICD-10-PCS | Mod: 26,,, | Performed by: INTERNAL MEDICINE

## 2023-08-18 PROCEDURE — 93306 TTE W/DOPPLER COMPLETE: CPT

## 2023-08-18 PROCEDURE — 93793 ANTICOAG MGMT PT WARFARIN: CPT | Mod: S$GLB,,,

## 2023-08-18 NOTE — PROGRESS NOTES
[facility]    2023 4:30 PM    Assessment/Plan:    Patient presents today with therapeutic INR.    Assessment of patient findings and chart review: no significant findings     Recommendation for patient's warfarin regimen: Continue current maintenance dose    Recommend repeat INR in 1 week  _________________________________________________________________    Tim Richards (57 y.o.) is followed by the [facility].    Anticoagulation Summary  As of 2023      INR goal:  2.0-3.0   TTR:  70.2 % (4.9 y)   INR used for dosin.2 (2023)   Warfarin maintenance plan:  5 mg (5 mg x 1) every day   Weekly warfarin total:  35 mg   Plan last modified:  Pearl Mendez, PharmD (2023)   Next INR check:  2023   Target end date:      Indications    LVAD (left ventricular assist device) present (Resolved) [Z95.811]  Anticoagulation monitoring  INR range 2-3 (Resolved) [Z79.01]                 Anticoagulation Episode Summary       INR check location:  Clinic Lab    Preferred lab:      Send INR reminders to:  [facility] LV[facility]    Comments:  LVAD// [facility]//Lab [place] Results: [id number] Acct# [id number] Phone: [phone]//dc 23 [facility] [phone], fax [phone] //pt declined meter, see 10/11/22 & 22 notes// Bridge:NO (h/o bleeds)          Anticoagulation Care Providers       Provider Role Specialty Phone number    Antonio Hadley Jr., MD Responsible Transplant [phone]

## 2023-08-21 ENCOUNTER — TELEPHONE (OUTPATIENT)
Dept: TRANSPLANT | Facility: CLINIC | Age: 58
End: 2023-08-21
Payer: MEDICARE

## 2023-08-21 ENCOUNTER — PATIENT MESSAGE (OUTPATIENT)
Dept: TRANSPLANT | Facility: CLINIC | Age: 58
End: 2023-08-21
Payer: MEDICARE

## 2023-08-21 ENCOUNTER — DOCUMENTATION ONLY (OUTPATIENT)
Dept: TRANSPLANT | Facility: CLINIC | Age: 58
End: 2023-08-21
Payer: MEDICARE

## 2023-08-21 NOTE — TELEPHONE ENCOUNTER
Echo completed 8/18 after speed decreased from 6400-->6200. Wife reports he has a lot of fatigue, does not feel like getting out of bed most days, decreased appetite only eating one meal per day and lower extremity edema which he has been taking PRN lasix for. Message sent to Dr Rodriguez regarding symptoms and to review echo.    Echo reviewed by Dr Rodriguez, recommends RHC to further assess pump and heart function. Informed Mrs. Richards of need for RHC, states she will let her  know and requested for procedure to be scheduled on a Friday if possible. Will send message to transplant schedulers.

## 2023-08-24 ENCOUNTER — LAB VISIT (OUTPATIENT)
Dept: LAB | Facility: HOSPITAL | Age: 58
End: 2023-08-24
Attending: INTERNAL MEDICINE
Payer: MEDICARE

## 2023-08-24 ENCOUNTER — PATIENT MESSAGE (OUTPATIENT)
Dept: TRANSPLANT | Facility: CLINIC | Age: 58
End: 2023-08-24
Payer: MEDICARE

## 2023-08-24 DIAGNOSIS — Z95.811 HEART REPLACED BY HEART ASSIST DEVICE: ICD-10-CM

## 2023-08-24 LAB
INR PPP: 1.8 (ref 0.8–1.2)
PROTHROMBIN TIME: 18.1 SEC (ref 9–12.5)

## 2023-08-24 PROCEDURE — 85610 PROTHROMBIN TIME: CPT | Performed by: INTERNAL MEDICINE

## 2023-08-24 PROCEDURE — 36415 COLL VENOUS BLD VENIPUNCTURE: CPT | Performed by: INTERNAL MEDICINE

## 2023-08-25 ENCOUNTER — ANTI-COAG VISIT (OUTPATIENT)
Dept: CARDIOLOGY | Facility: CLINIC | Age: 58
End: 2023-08-25
Payer: MEDICARE

## 2023-08-25 PROCEDURE — 93793 ANTICOAG MGMT PT WARFARIN: CPT | Mod: S$GLB,,,

## 2023-08-25 PROCEDURE — 93793 PR ANTICOAGULANT MGMT FOR PT TAKING WARFARIN: ICD-10-PCS | Mod: S$GLB,,,

## 2023-08-25 NOTE — PROGRESS NOTES
Kelly reports correct Warfarin dose, stated patient had lots of broccoli and salad this week, no other changes reported.

## 2023-08-25 NOTE — PROGRESS NOTES
[facility]    2023 2:28 PM    Assessment/Plan:    Patient presents today with subtherapeutic  INR.    Assessment of patient findings and chart review: Reports broccoli intake    Recommendation for patient's warfarin regimen: Increase maintenance dose    Recommend repeat INR Monday  _________________________________________________________________    Tim Richards (57 y.o.) is followed by the [facility].    Anticoagulation Summary  As of 2023      INR goal:  2.0-3.0   TTR:  70.1 % (4.9 y)   INR used for dosin.8 (2023)   Warfarin maintenance plan:  7.5 mg (5 mg x 1.5) every Fri; 5 mg (5 mg x 1) all other days   Weekly warfarin total:  37.5 mg   Plan last modified:  Pearl Mendez, PharmD (2023)   Next INR check:  2023   Target end date:      Indications    LVAD (left ventricular assist device) present (Resolved) [Z95.811]  Anticoagulation monitoring  INR range 2-3 (Resolved) [Z79.01]                 Anticoagulation Episode Summary       INR check location:  Clinic Lab    Preferred lab:      Send INR reminders to:  [facility] LVAD    Comments:  LVAD// [facility]//Lab [place] Results: [id number] [id number] Phone: [phone]//dc 23 [facility] [phone], fax [phone] //pt declined meter, see 10/11/22 & 22 notes// Bridge:NO (h/o bleeds)          Anticoagulation Care Providers       Provider Role Specialty Phone number    Antonio Hadley Jr., MD Responsible Transplant [phone]

## 2023-08-29 NOTE — PROGRESS NOTES
8/29/23 Patient called to reschedule 8/28 missed lab appointment to today 8/29, tried to reschedule appointment at [facility] but nothing available until 8/31, Patient already has other clinic appointments on 8/31, inquiring if ok to wait until then

## 2023-08-31 ENCOUNTER — LAB VISIT (OUTPATIENT)
Dept: LAB | Facility: HOSPITAL | Age: 58
End: 2023-08-31
Attending: INTERNAL MEDICINE
Payer: MEDICARE

## 2023-08-31 ENCOUNTER — TELEPHONE (OUTPATIENT)
Dept: TRANSPLANT | Facility: CLINIC | Age: 58
End: 2023-08-31
Payer: MEDICARE

## 2023-08-31 ENCOUNTER — CLINICAL SUPPORT (OUTPATIENT)
Dept: TRANSPLANT | Facility: CLINIC | Age: 58
End: 2023-08-31
Payer: MEDICARE

## 2023-08-31 ENCOUNTER — ANTI-COAG VISIT (OUTPATIENT)
Dept: CARDIOLOGY | Facility: CLINIC | Age: 58
End: 2023-08-31
Payer: MEDICARE

## 2023-08-31 ENCOUNTER — HOSPITAL ENCOUNTER (OUTPATIENT)
Dept: PULMONOLOGY | Facility: CLINIC | Age: 58
Discharge: HOME | End: 2023-08-31
Payer: MEDICARE

## 2023-08-31 ENCOUNTER — OFFICE VISIT (OUTPATIENT)
Dept: TRANSPLANT | Facility: CLINIC | Age: 58
End: 2023-08-31
Payer: MEDICARE

## 2023-08-31 VITALS
WEIGHT: 221 LBS | HEIGHT: 73 IN | BODY MASS INDEX: 30.35 KG/M2 | WEIGHT: 229 LBS | BODY MASS INDEX: 29.29 KG/M2 | TEMPERATURE: 98 F | HEIGHT: 73 IN | SYSTOLIC BLOOD PRESSURE: 82 MMHG

## 2023-08-31 DIAGNOSIS — Z79.01 ANTICOAGULATION MONITORING, INR RANGE 2-3: ICD-10-CM

## 2023-08-31 DIAGNOSIS — I50.42 CHRONIC COMBINED SYSTOLIC AND DIASTOLIC HEART FAILURE: ICD-10-CM

## 2023-08-31 DIAGNOSIS — I73.9 PVD (PERIPHERAL VASCULAR DISEASE): ICD-10-CM

## 2023-08-31 DIAGNOSIS — Z95.811 HEART REPLACED BY HEART ASSIST DEVICE: ICD-10-CM

## 2023-08-31 DIAGNOSIS — Z95.811 LVAD (LEFT VENTRICULAR ASSIST DEVICE) PRESENT: ICD-10-CM

## 2023-08-31 DIAGNOSIS — G47.33 OSA (OBSTRUCTIVE SLEEP APNEA): Chronic | ICD-10-CM

## 2023-08-31 DIAGNOSIS — Z95.811 LVAD (LEFT VENTRICULAR ASSIST DEVICE) PRESENT: Primary | ICD-10-CM

## 2023-08-31 DIAGNOSIS — I47.29 PVT (PAROXYSMAL VENTRICULAR TACHYCARDIA): ICD-10-CM

## 2023-08-31 DIAGNOSIS — I10 ESSENTIAL HYPERTENSION: ICD-10-CM

## 2023-08-31 DIAGNOSIS — Z95.811 LVAD (LEFT VENTRICULAR ASSIST DEVICE) PRESENT: Chronic | ICD-10-CM

## 2023-08-31 DIAGNOSIS — N18.32 STAGE 3B CHRONIC KIDNEY DISEASE: ICD-10-CM

## 2023-08-31 DIAGNOSIS — I42.0 DILATED CARDIOMYOPATHY: ICD-10-CM

## 2023-08-31 DIAGNOSIS — Z95.811 LVAD (LEFT VENTRICULAR ASSIST DEVICE) PRESENT: Primary | Chronic | ICD-10-CM

## 2023-08-31 LAB
ALBUMIN SERPL BCP-MCNC: 4 G/DL (ref 3.5–5.2)
ALP SERPL-CCNC: 101 U/L (ref 55–135)
ALT SERPL W/O P-5'-P-CCNC: 95 U/L (ref 10–44)
ANION GAP SERPL CALC-SCNC: 10 MMOL/L (ref 8–16)
AST SERPL-CCNC: 80 U/L (ref 10–40)
BASOPHILS # BLD AUTO: 0.02 K/UL (ref 0–0.2)
BASOPHILS NFR BLD: 0.5 % (ref 0–1.9)
BILIRUB DIRECT SERPL-MCNC: 0.2 MG/DL (ref 0.1–0.3)
BILIRUB SERPL-MCNC: 0.5 MG/DL (ref 0.1–1)
BNP SERPL-MCNC: 506 PG/ML (ref 0–99)
BUN SERPL-MCNC: 26 MG/DL (ref 6–20)
CALCIUM SERPL-MCNC: 9.5 MG/DL (ref 8.7–10.5)
CHLORIDE SERPL-SCNC: 102 MMOL/L (ref 95–110)
CO2 SERPL-SCNC: 26 MMOL/L (ref 23–29)
CREAT SERPL-MCNC: 2.6 MG/DL (ref 0.5–1.4)
CRP SERPL-MCNC: 16.7 MG/L (ref 0–8.2)
DIFFERENTIAL METHOD: ABNORMAL
EOSINOPHIL # BLD AUTO: 0.2 K/UL (ref 0–0.5)
EOSINOPHIL NFR BLD: 3.7 % (ref 0–8)
ERYTHROCYTE [DISTWIDTH] IN BLOOD BY AUTOMATED COUNT: 16.7 % (ref 11.5–14.5)
EST. GFR  (NO RACE VARIABLE): 28.1 ML/MIN/1.73 M^2
GLUCOSE SERPL-MCNC: 98 MG/DL (ref 70–110)
HCT VFR BLD AUTO: 44.3 % (ref 40–54)
HGB BLD-MCNC: 13.3 G/DL (ref 14–18)
IMM GRANULOCYTES # BLD AUTO: 0.01 K/UL (ref 0–0.04)
IMM GRANULOCYTES NFR BLD AUTO: 0.2 % (ref 0–0.5)
INR PPP: 1.8 (ref 0.8–1.2)
LDH SERPL L TO P-CCNC: 193 U/L (ref 110–260)
LYMPHOCYTES # BLD AUTO: 1.2 K/UL (ref 1–4.8)
LYMPHOCYTES NFR BLD: 28.1 % (ref 18–48)
MAGNESIUM SERPL-MCNC: 2.4 MG/DL (ref 1.6–2.6)
MCH RBC QN AUTO: 27.3 PG (ref 27–31)
MCHC RBC AUTO-ENTMCNC: 30 G/DL (ref 32–36)
MCV RBC AUTO: 91 FL (ref 82–98)
MONOCYTES # BLD AUTO: 0.6 K/UL (ref 0.3–1)
MONOCYTES NFR BLD: 13.9 % (ref 4–15)
NEUTROPHILS # BLD AUTO: 2.2 K/UL (ref 1.8–7.7)
NEUTROPHILS NFR BLD: 53.6 % (ref 38–73)
NRBC BLD-RTO: 0 /100 WBC
PHOSPHATE SERPL-MCNC: 2 MG/DL (ref 2.7–4.5)
PLATELET # BLD AUTO: 210 K/UL (ref 150–450)
PMV BLD AUTO: 11.2 FL (ref 9.2–12.9)
POTASSIUM SERPL-SCNC: 3.6 MMOL/L (ref 3.5–5.1)
PREALB SERPL-MCNC: 23 MG/DL (ref 20–43)
PROT SERPL-MCNC: 8.3 G/DL (ref 6–8.4)
PROTHROMBIN TIME: 19.1 SEC (ref 9–12.5)
RBC # BLD AUTO: 4.87 M/UL (ref 4.6–6.2)
SODIUM SERPL-SCNC: 138 MMOL/L (ref 136–145)
WBC # BLD AUTO: 4.09 K/UL (ref 3.9–12.7)

## 2023-08-31 PROCEDURE — 94618 PULMONARY STRESS TESTING: ICD-10-PCS | Mod: S$GLB,,, | Performed by: INTERNAL MEDICINE

## 2023-08-31 PROCEDURE — 82248 BILIRUBIN DIRECT: CPT | Performed by: INTERNAL MEDICINE

## 2023-08-31 PROCEDURE — 83615 LACTATE (LD) (LDH) ENZYME: CPT | Performed by: INTERNAL MEDICINE

## 2023-08-31 PROCEDURE — 99215 PR OFFICE/OUTPT VISIT, EST, LEVL V, 40-54 MIN: ICD-10-PCS | Mod: S$GLB,,, | Performed by: INTERNAL MEDICINE

## 2023-08-31 PROCEDURE — 85025 COMPLETE CBC W/AUTO DIFF WBC: CPT | Performed by: INTERNAL MEDICINE

## 2023-08-31 PROCEDURE — 3008F BODY MASS INDEX DOCD: CPT | Mod: CPTII,S$GLB,, | Performed by: INTERNAL MEDICINE

## 2023-08-31 PROCEDURE — 85610 PROTHROMBIN TIME: CPT | Performed by: INTERNAL MEDICINE

## 2023-08-31 PROCEDURE — 94618 PULMONARY STRESS TESTING: CPT | Mod: S$GLB,,, | Performed by: INTERNAL MEDICINE

## 2023-08-31 PROCEDURE — 83735 ASSAY OF MAGNESIUM: CPT | Performed by: INTERNAL MEDICINE

## 2023-08-31 PROCEDURE — 84100 ASSAY OF PHOSPHORUS: CPT | Performed by: INTERNAL MEDICINE

## 2023-08-31 PROCEDURE — 84134 ASSAY OF PREALBUMIN: CPT | Performed by: INTERNAL MEDICINE

## 2023-08-31 PROCEDURE — 80053 COMPREHEN METABOLIC PANEL: CPT | Performed by: INTERNAL MEDICINE

## 2023-08-31 PROCEDURE — 99999 PR PBB SHADOW E&M-EST. PATIENT-LVL IV: CPT | Mod: PBBFAC,,, | Performed by: INTERNAL MEDICINE

## 2023-08-31 PROCEDURE — 3008F PR BODY MASS INDEX (BMI) DOCUMENTED: ICD-10-PCS | Mod: CPTII,S$GLB,, | Performed by: INTERNAL MEDICINE

## 2023-08-31 PROCEDURE — 99215 OFFICE O/P EST HI 40 MIN: CPT | Mod: S$GLB,,, | Performed by: INTERNAL MEDICINE

## 2023-08-31 PROCEDURE — 36415 COLL VENOUS BLD VENIPUNCTURE: CPT | Performed by: INTERNAL MEDICINE

## 2023-08-31 PROCEDURE — 99999 PR PBB SHADOW E&M-EST. PATIENT-LVL IV: ICD-10-PCS | Mod: PBBFAC,,, | Performed by: INTERNAL MEDICINE

## 2023-08-31 PROCEDURE — 83880 ASSAY OF NATRIURETIC PEPTIDE: CPT | Performed by: INTERNAL MEDICINE

## 2023-08-31 PROCEDURE — 86140 C-REACTIVE PROTEIN: CPT | Performed by: INTERNAL MEDICINE

## 2023-08-31 NOTE — H&P (VIEW-ONLY)
Subjective:   Patient ID:  Tim Richards is a 57 y.o. male who presents for LVAD followup visit.    Implant Date: Heartmate II on 3/8/2018 (outflow graft changed 3/9/2018), exchanged to Heartmate 3 on 7/13/2022     Heartmate 3 RPM 6200     INR goal: 2-3 (INR meter)  NO Bridge with heparin  Antiplatelets: ASA 81mg     GIB: 7/16/19 (10 units of blood)        8/31/2023    11:48 AM   TXP SACHI INTERROGATIONS   Type HeartMate3   Flow 5.4   Speed 6200   PI 1.9   Power (Jackson) 5.3   LSL 5800   Pulsatility No Pulse       HPI  Mr. Richards is a very pleasant 58 yo black male with stage D HFrEF who was previously supported with a HM2 (implanted 3/8/2018 as DT-VAD) but required pump exchange (HM3 pump exchange 7/13/2022) for thrombosis of pump post COVID-19 PNA and AHRF.  Prior to pump exchange had ADHF and with diuresis and in the face of hypokalemia had cardiac arrest.  He has long hx of VT with recurrent shocks and is on chronic amiodarone and mexiletine.  His post-op course following pump exchange was prolonged and complicated by worsening cardiogenic shock/RV failure requiring VA-ECMO.  He also had recurrent VT as well as atrial flutter with RVR. In June/July 2022 he was tapered of steroids (on for amiodarone hyperthyroid) due to concern for avascular necrosis of hip. 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 He was on ertapenem and voriconazole for this. He then saw ID on 1/6/22 and they stopped the ertapenem and DC PICC line. Today comes for his regular VAD visit. Durign last visit he was seen by Dr. Hadley who ordered a RHC (scheduled for next week) to guide  "optimization of his regimen and improve his renal function. Today he feels well but reports COLEMAN. Has no complaints. VAD speed is at 5400 rpm. No VAD alarms noted on interrogation occasional PI events . BP is 82 mm of Hg. DLES is a "1". INR is pending. LDh is at baseline. He completed a 6 MWT and walked 900 Ft (247.5 meters) with moderate dyspnea.       2D Echo with CFD done on 8/18/2023  LVAD: There is a Heartmate III LVAD running at 6200 RPM. The aortic valve does not open. The ventricular septum is at midline.    Left Ventricle: The left ventricle is severely dilated. Normal wall thickness. Normal wall motion. There is severely reduced systolic function. There is diastolic dysfunction.    Right Ventricle: Mild right ventricular enlargement. Right ventricle wall motion  is normal. Systolic function is moderately reduced.    Severe biatrial enlargement    Pulmonary Artery: The estimated pulmonary artery systolic pressure is 40 mmHg.    IVC/SVC: Intermediate venous pressure at 8 mmHg.    Review of Systems   Constitutional: Positive for malaise/fatigue. Negative for chills, decreased appetite, diaphoresis, fever, night sweats, weight gain and weight loss.   Eyes: Negative.    Cardiovascular:  Positive for dyspnea on exertion. Negative for chest pain, claudication, cyanosis, irregular heartbeat, leg swelling, near-syncope, orthopnea, palpitations, paroxysmal nocturnal dyspnea and syncope.   Respiratory:  Negative for cough, hemoptysis and shortness of breath.    Endocrine: Negative.    Hematologic/Lymphatic: Negative.    Skin:  Negative for color change, dry skin and nail changes.   Musculoskeletal: Negative.    Gastrointestinal: Negative.    Genitourinary: Negative.    Neurological:  Negative for weakness.       Objective:   Blood pressure (!) 82/0, temperature 98.2 °F (36.8 °C), temperature source Oral, height 6' 1" (1.854 m), weight 100.2 kg (221 lb).body mass index is 29.16 kg/m².    Doppler: 82    Physical " "Exam  Vitals reviewed.   Constitutional:       Appearance: He is well-developed.      Comments: BP (!) 82/0 (BP Location: Left arm, Patient Position: Sitting) Comment (BP Method): doppler  Temp 98.2 °F (36.8 °C) (Oral)   Ht 6' 1" (1.854 m)   Wt 100.2 kg (221 lb)   BMI 29.16 kg/m²      HENT:      Head: Normocephalic.   Neck:      Vascular: JVD present. No carotid bruit.   Cardiovascular:      Heart sounds: No murmur heard.     Comments: Smooth VAD hum. DLES is "1"  Pulmonary:      Effort: Pulmonary effort is normal.      Breath sounds: Normal breath sounds.   Abdominal:      General: Bowel sounds are normal.      Palpations: Abdomen is soft.   Skin:     General: Skin is warm.   Neurological:      Mental Status: He is alert.       Lab Results   Component Value Date    WBC 4.09 08/31/2023    HGB 13.3 (L) 08/31/2023    HCT 44.3 08/31/2023    MCV 91 08/31/2023     08/31/2023    CO2 26 08/31/2023    CREATININE 2.6 (H) 08/31/2023    CALCIUM 9.5 08/31/2023    ALBUMIN 4.0 08/31/2023    AST 80 (H) 08/31/2023     (H) 08/31/2023    ALT 95 (H) 08/31/2023     08/31/2023       Lab Results   Component Value Date    INR 1.8 (H) 08/31/2023    INR 1.8 (H) 08/24/2023    INR 2.2 (H) 08/18/2023       BNP   Date Value Ref Range Status   08/31/2023 506 (H) 0 - 99 pg/mL Final     Comment:     Values of less than 100 pg/ml are consistent with non-CHF populations.   06/27/2023 495 (H) 0 - 99 pg/mL Final     Comment:     Values of less than 100 pg/ml are consistent with non-CHF populations.   05/31/2023 130 (H) 0 - 99 pg/mL Final     Comment:     Values of less than 100 pg/ml are consistent with non-CHF populations.       LD   Date Value Ref Range Status   05/31/2023 199 110 - 260 U/L Final     Comment:     Results are increased in hemolyzed samples.   04/13/2023 204 110 - 260 U/L Final     Comment:     Results are increased in hemolyzed samples.   04/07/2023 259 110 - 260 U/L Final     Comment:     Results are " increased in hemolyzed samples.  *Result may be interfered by visible hemolysis         Assessment:      1. LVAD (left ventricular assist device) present    2. Chronic combined systolic and diastolic heart failure    3. Essential hypertension    4. Dilated cardiomyopathy    5. PVT (paroxysmal ventricular tachycardia)    6. Anticoagulation monitoring, INR range 2-3    7. CHICHI (obstructive sleep apnea)    8. Stage 3b chronic kidney disease    9. PVD (peripheral vascular disease)        Plan:   Patient is now NYHA class III with elevated BNP and worsenign renal function (likely cardiorenal). I have reviewed his 2 previous echoes. Recommend to increase LVAD speed to 6300 rpm and also resume lasix 40 mg daily. Repeat labs on Tuesday.  Plan for a RHC next week.   BP is controlled.  INR is subtherapeutic. Coumadin clinic to adjust his dose.   VAD interrogation was performed in clinic  Any VAD management kits dispensed today medically necessary  Recommend 2 gram sodium restriction and 1500cc fluid restriction.  Encourage physical activity with graded exercise program.  Requested patient to weigh themselves daily, and to notify us if their weight increases by more than 3 lbs in 1 day or 5 lbs in 1 week.   In view of his labile creatinine and volume issues, recommend to see him monthly for now (until he is more stable.    RTC in 1 month   Additionally, the patient has a patient centered goal of being able to get stronger.     Patient advised that it is recommended that all patients, and their close contacts and household members receive Covid vaccination.    Listed for transplant:     UNOS Patient Status  Functional Status: 70% - Cares for self: unable to carry on normal activity or active work  Physical Capacity: No Limitations  Working for Income: Unknown    Guerda Bautista MD

## 2023-08-31 NOTE — PROGRESS NOTES
Subjective:   Patient ID:  iTm Richards is a 57 y.o. male who presents for LVAD followup visit.    Implant Date: Heartmate II on 3/8/2018 (outflow graft changed 3/9/2018), exchanged to Heartmate 3 on 7/13/2022     Heartmate 3 RPM 6200     INR goal: 2-3 (INR meter)  NO Bridge with heparin  Antiplatelets: ASA 81mg     GIB: 7/16/19 (10 units of blood)        8/31/2023    11:48 AM   TXP SACHI INTERROGATIONS   Type HeartMate3   Flow 5.4   Speed 6200   PI 1.9   Power (Jackson) 5.3   LSL 5800   Pulsatility No Pulse       HPI  Mr. Richards is a very pleasant 58 yo black male with stage D HFrEF who was previously supported with a HM2 (implanted 3/8/2018 as DT-VAD) but required pump exchange (HM3 pump exchange 7/13/2022) for thrombosis of pump post COVID-19 PNA and AHRF.  Prior to pump exchange had ADHF and with diuresis and in the face of hypokalemia had cardiac arrest.  He has long hx of VT with recurrent shocks and is on chronic amiodarone and mexiletine.  His post-op course following pump exchange was prolonged and complicated by worsening cardiogenic shock/RV failure requiring VA-ECMO.  He also had recurrent VT as well as atrial flutter with RVR. In June/July 2022 he was tapered of steroids (on for amiodarone hyperthyroid) due to concern for avascular necrosis of hip. 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 He was on ertapenem and voriconazole for this. He then saw ID on 1/6/22 and they stopped the ertapenem and DC PICC line. Today comes for his regular VAD visit. Durign last visit he was seen by Dr. Hadley who ordered a RHC (scheduled for next week) to guide  "optimization of his regimen and improve his renal function. Today he feels well but reports COLEMAN. Has no complaints. VAD speed is at 5400 rpm. No VAD alarms noted on interrogation occasional PI events . BP is 82 mm of Hg. DLES is a "1". INR is pending. LDh is at baseline. He completed a 6 MWT and walked 900 Ft (247.5 meters) with moderate dyspnea.       2D Echo with CFD done on 8/18/2023  LVAD: There is a Heartmate III LVAD running at 6200 RPM. The aortic valve does not open. The ventricular septum is at midline.    Left Ventricle: The left ventricle is severely dilated. Normal wall thickness. Normal wall motion. There is severely reduced systolic function. There is diastolic dysfunction.    Right Ventricle: Mild right ventricular enlargement. Right ventricle wall motion  is normal. Systolic function is moderately reduced.    Severe biatrial enlargement    Pulmonary Artery: The estimated pulmonary artery systolic pressure is 40 mmHg.    IVC/SVC: Intermediate venous pressure at 8 mmHg.    Review of Systems   Constitutional: Positive for malaise/fatigue. Negative for chills, decreased appetite, diaphoresis, fever, night sweats, weight gain and weight loss.   Eyes: Negative.    Cardiovascular:  Positive for dyspnea on exertion. Negative for chest pain, claudication, cyanosis, irregular heartbeat, leg swelling, near-syncope, orthopnea, palpitations, paroxysmal nocturnal dyspnea and syncope.   Respiratory:  Negative for cough, hemoptysis and shortness of breath.    Endocrine: Negative.    Hematologic/Lymphatic: Negative.    Skin:  Negative for color change, dry skin and nail changes.   Musculoskeletal: Negative.    Gastrointestinal: Negative.    Genitourinary: Negative.    Neurological:  Negative for weakness.       Objective:   Blood pressure (!) 82/0, temperature 98.2 °F (36.8 °C), temperature source Oral, height 6' 1" (1.854 m), weight 100.2 kg (221 lb).body mass index is 29.16 kg/m².    Doppler: 82    Physical " "Exam  Vitals reviewed.   Constitutional:       Appearance: He is well-developed.      Comments: BP (!) 82/0 (BP Location: Left arm, Patient Position: Sitting) Comment (BP Method): doppler  Temp 98.2 °F (36.8 °C) (Oral)   Ht 6' 1" (1.854 m)   Wt 100.2 kg (221 lb)   BMI 29.16 kg/m²      HENT:      Head: Normocephalic.   Neck:      Vascular: JVD present. No carotid bruit.   Cardiovascular:      Heart sounds: No murmur heard.     Comments: Smooth VAD hum. DLES is "1"  Pulmonary:      Effort: Pulmonary effort is normal.      Breath sounds: Normal breath sounds.   Abdominal:      General: Bowel sounds are normal.      Palpations: Abdomen is soft.   Skin:     General: Skin is warm.   Neurological:      Mental Status: He is alert.       Lab Results   Component Value Date    WBC 4.09 08/31/2023    HGB 13.3 (L) 08/31/2023    HCT 44.3 08/31/2023    MCV 91 08/31/2023     08/31/2023    CO2 26 08/31/2023    CREATININE 2.6 (H) 08/31/2023    CALCIUM 9.5 08/31/2023    ALBUMIN 4.0 08/31/2023    AST 80 (H) 08/31/2023     (H) 08/31/2023    ALT 95 (H) 08/31/2023     08/31/2023       Lab Results   Component Value Date    INR 1.8 (H) 08/31/2023    INR 1.8 (H) 08/24/2023    INR 2.2 (H) 08/18/2023       BNP   Date Value Ref Range Status   08/31/2023 506 (H) 0 - 99 pg/mL Final     Comment:     Values of less than 100 pg/ml are consistent with non-CHF populations.   06/27/2023 495 (H) 0 - 99 pg/mL Final     Comment:     Values of less than 100 pg/ml are consistent with non-CHF populations.   05/31/2023 130 (H) 0 - 99 pg/mL Final     Comment:     Values of less than 100 pg/ml are consistent with non-CHF populations.       LD   Date Value Ref Range Status   05/31/2023 199 110 - 260 U/L Final     Comment:     Results are increased in hemolyzed samples.   04/13/2023 204 110 - 260 U/L Final     Comment:     Results are increased in hemolyzed samples.   04/07/2023 259 110 - 260 U/L Final     Comment:     Results are " increased in hemolyzed samples.  *Result may be interfered by visible hemolysis         Assessment:      1. LVAD (left ventricular assist device) present    2. Chronic combined systolic and diastolic heart failure    3. Essential hypertension    4. Dilated cardiomyopathy    5. PVT (paroxysmal ventricular tachycardia)    6. Anticoagulation monitoring, INR range 2-3    7. CHICHI (obstructive sleep apnea)    8. Stage 3b chronic kidney disease    9. PVD (peripheral vascular disease)        Plan:   Patient is now NYHA class III with elevated BNP and worsenign renal function (likely cardiorenal). I have reviewed his 2 previous echoes. Recommend to increase LVAD speed to 6300 rpm and also resume lasix 40 mg daily. Repeat labs on Tuesday.  Plan for a RHC next week.   BP is controlled.  INR is subtherapeutic. Coumadin clinic to adjust his dose.   VAD interrogation was performed in clinic  Any VAD management kits dispensed today medically necessary  Recommend 2 gram sodium restriction and 1500cc fluid restriction.  Encourage physical activity with graded exercise program.  Requested patient to weigh themselves daily, and to notify us if their weight increases by more than 3 lbs in 1 day or 5 lbs in 1 week.   In view of his labile creatinine and volume issues, recommend to see him monthly for now (until he is more stable.    RTC in 1 month   Additionally, the patient has a patient centered goal of being able to get stronger.     Patient advised that it is recommended that all patients, and their close contacts and household members receive Covid vaccination.    Listed for transplant:     UNOS Patient Status  Functional Status: 70% - Cares for self: unable to carry on normal activity or active work  Physical Capacity: No Limitations  Working for Income: Unknown    Guerda Bautista MD

## 2023-08-31 NOTE — PROGRESS NOTES
Patient was seen today in clinic. His backup controller was charged and self test passed.      Equipment:  Any Equipment Issues: None noted     Emergency Bag  Emergency Equipment With Patient: Yes  Condition of emergency bag: Good  Contents of emergency bag: Backup controller, 2 fully charged batteries, 2 battery clips, reference cards    Maintenance Tracking  Patient has monthly checklist today: No  Patient correctly utilizing monthly checklist: Yes  Educated patient on utilizing monthly checklist correctly and importance of this: Yes    Equipment Inspection  Inspected patient's equipment today: Yes  Equipment clean and free of debris, including locking mechanism: Yes  Cleaned equipment today and educated patient on how to do this: Yes    Manual and visual inspection of driveline: No  Kinks, rescue tape, tears: No  Rescue tape applied: No  Clamshell repair: No  Perc lead repair: No    Backup Controller and Emergency Backup Battery  Backup controller serial number: [id number]  EBB S/N: [id number]  Manufacture date: 5/5/2022  EBB due to be charged: 2/2024  EBB charged today and self test completed: Yes  Self test passed:  Yes  EBB expires and due to be changed: 5/2025  EBB changed today: No    It is medically necessary to ensure patient has properly functioning equipment and wearables to prevent infection, injury or death to patient.   Waveforms sent: No

## 2023-08-31 NOTE — LETTER
August 31, 2023        Nader Chiu  [address]  [address]  [place] [address]  Phone: [phone]  Fax: [phone]     Nader Chiu  [address] [place] [address]  Phone: [phone]  Fax: [phone]             [facility]  [address]  [place] [address]  Phone: [phone]   Patient: Tim Richards   MR Number: [id number]   YOB: 1965   Date of Visit: 8/31/2023       Dear Dr. Nader Chiu, Nader Chiu    Thank you for referring Tim Richards to me for evaluation. Attached you will find relevant portions of my assessment and plan of care.    If you have questions, please do not hesitate to call me. I look forward to following Tim Richards along with you.    Sincerely,    Guerda Bautista MD    Enclosure    If you would like to receive this communication electronically, please contact [email or web address] or [phone] to request [employer] access.    [employer] is a tool which provides read-only access to select patient information with whom you have a relationship. Its easy to use and provides real time access to review your patients record including encounter summaries, notes, results, and demographic information.    If you feel you have received this communication in error or would no longer like to receive these types of communications, please e-mail [email or web address]

## 2023-08-31 NOTE — PROCEDURES
Tim Richards is a 57 y.o.  male patient, who presents for a 6 minute walk test ordered by MD Shae.  The diagnosis is Left Ventricular Assist Device; Cardiomyopathy.  The patient's BMI is 30.2 kg/m2.  Predicted distance (lower limit of normal) is 428.94 meters.      Test Results:    The test was completed without stopping. The total time walked was 360 seconds.  During walking, the patient reported:  Dyspnea, Leg pain, Lightheadedness. The patient used no assistive devices during testing.     08/31/2023---------Distance: 274.32 meters (900 feet)     O2 Sat % Supplemental Oxygen Heart Rate Blood Pressure Renan Scale   Pre-exercise  (Resting) 96 % Room Air 42 bpm Unable to obtain 3   During Exercise 93 % Room Air 82 bpm Unable to obtain 9   Post-exercise  (Recovery) 96 % Room Air  69 bpm       Recovery Time:  186 seconds                        The patient walked the first 15 feet in 5.53 seconds.    Performing nurse/tech: Stephen MARTINEZ STUDY:   11/03/2022---------Distance: 121.92 meters (400 feet)       O2 Sat % Supplemental Oxygen Heart Rate Blood Pressure Renan Scale   Pre-exercise  (Resting) 94 % Room Air 33 bpm Unable to obtain 0.5   During Exercise Unable to obtain Room Air Unable to obtain Unable to obtain 5-6   Post-exercise  (Recovery) Unable to obtain Room Air Unable to obtain          Recovery Time:  120 seconds                        The patient walked the first 15 feet in 11.38 seconds.      CLINICAL INTERPRETATION:  Six minute walk distance is 274.32 meters (900 feet) with very, very heavy dyspnea.  During exercise, there was desaturation while breathing room air.  Heart rate increased significantly with walking.  Bradycardia was present prior to exercise.  The patient reported non-pulmonary symptoms during exercise.  Significant exercise impairment is likely due to cardiovascular causes and subjective symptoms.  The patient did complete the study, walking 360 seconds of  the 360 second test.  Since the previous study in November 2022, exercise capacity is significantly improved.  Based upon age and body mass index, exercise capacity is less than predicted.

## 2023-08-31 NOTE — PROGRESS NOTES
[facility]    2023 12:09 PM    Assessment/Plan:    Patient presents today with subtherapeutic  INR.    Assessment of patient findings and chart review: Took lower dose than advised    Recommendation for patient's warfarin regimen:  Take correct dose    Recommend repeat INR Tuesday  _________________________________________________________________    Tim Richards (57 y.o.) is followed by the [facility].    Anticoagulation Summary  As of 2023      INR goal:  2.0-3.0   TTR:  69.8 % (4.9 y)   INR used for dosin.8 (2023)   Warfarin maintenance plan:  7.5 mg (5 mg x 1.5) every Thu; 5 mg (5 mg x 1) all other days   Weekly warfarin total:  37.5 mg   Plan last modified:  Pearl Mendez, PharmD (2023)   Next INR check:  2023   Target end date:      Indications    LVAD (left ventricular assist device) present (Resolved) [Z95.811]  Anticoagulation monitoring  INR range 2-3 (Resolved) [Z79.01]                 Anticoagulation Episode Summary       INR check location:  Clinic Lab    Preferred lab:      Send INR reminders to:  [facility] COUMADIN LVAD    Comments:  LVAD// [facility]//Lab [place] Results: [phone] [facility] [id number] Phone: [phone]//dc 23 [facility] [phone], fax [phone] //pt declined meter, see 10/11/22 & 22 notes// Bridge:NO (h/o bleeds)          Anticoagulation Care Providers       Provider Role Specialty Phone number    Antonio Hadley Jr., MD Responsible Transplant [phone]
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
## 2023-08-31 NOTE — TELEPHONE ENCOUNTER
Notified spouse that patient needs to return to clinic for speed adjustment and some medication changes. States she will notify patient.

## 2023-08-31 NOTE — PROGRESS NOTES
Patient reports taking Warfarin 5mg on 8/25/23 by mistake, took correct dose all other days, had 2 egg rolls 8/30/2023, no other changes reported.

## 2023-09-05 ENCOUNTER — ANTI-COAG VISIT (OUTPATIENT)
Dept: CARDIOLOGY | Facility: CLINIC | Age: 58
End: 2023-09-05
Payer: MEDICARE

## 2023-09-05 ENCOUNTER — LAB VISIT (OUTPATIENT)
Dept: LAB | Facility: HOSPITAL | Age: 58
End: 2023-09-05
Attending: INTERNAL MEDICINE
Payer: MEDICARE

## 2023-09-05 DIAGNOSIS — Z79.01 LONG TERM (CURRENT) USE OF ANTICOAGULANTS: ICD-10-CM

## 2023-09-05 DIAGNOSIS — Z95.811 LVAD (LEFT VENTRICULAR ASSIST DEVICE) PRESENT: ICD-10-CM

## 2023-09-05 LAB
INR PPP: 3.6 (ref 0.8–1.2)
PROTHROMBIN TIME: 34.6 SEC (ref 9–12.5)

## 2023-09-05 PROCEDURE — 85610 PROTHROMBIN TIME: CPT | Performed by: INTERNAL MEDICINE

## 2023-09-05 PROCEDURE — 36415 COLL VENOUS BLD VENIPUNCTURE: CPT | Performed by: INTERNAL MEDICINE

## 2023-09-05 PROCEDURE — 93793 ANTICOAG MGMT PT WARFARIN: CPT | Mod: S$GLB,,,

## 2023-09-05 PROCEDURE — 93793 PR ANTICOAGULANT MGMT FOR PT TAKING WARFARIN: ICD-10-PCS | Mod: S$GLB,,,

## 2023-09-05 NOTE — PROGRESS NOTES
Patient reports taking extra Warfarin 9/1/23 and does not know the dose that he took, took  correct Warfarin dose all other days, cut himself shaving this morning and it took 5 minutes to stop bleeding, spinach and jean carlos griffiths 9/4/2023, had 2 Bicardi Rum drinks 9/2/23,  no other changes reported.

## 2023-09-05 NOTE — PROGRESS NOTES
[facility]    09/05/2023 4:24 PM    Assessment/Plan:    Patient presents today with supratherapeutic INR.    Assessment of patient findings and chart review: Took extra dose one day, also alcohol intake; has [facility] 9/8    Recommendation for patient's warfarin regimen: Hold dose today then resume current maintenance dose    Recommend repeat INR Thursday  _________________________________________________________________    Tim Richards (57 y.o.) is followed by the [facility].    Anticoagulation Summary  As of 9/5/2023      INR goal:  2.0-3.0   TTR:  69.8 % (4.9 y)   INR used for dosing:  3.6 (9/5/2023)   Warfarin maintenance plan:  7.5 mg (5 mg x 1.5) every Thu; 5 mg (5 mg x 1) all other days   Weekly warfarin total:  37.5 mg   Plan last modified:  Pearl Mendez, PharmD (8/31/2023)   Next INR check:  9/7/2023   Target end date:      Indications    LVAD (left ventricular assist device) present (Resolved) [Z95.811]  Anticoagulation monitoring  INR range 2-3 (Resolved) [Z79.01]                 Anticoagulation Episode Summary       INR check location:  Clinic Lab    Preferred lab:      Send INR reminders to:  [facility] COUMADIN LVAD    Comments:  LVAD// [facility]//Lab [place] Results: [phone] [facility]# [id number] Phone: [phone]//dc 1/16/23 [place] -[phone], fax [phone] //pt declined meter, see 10/11/22 & 11/11/22 notes// Bridge:NO (h/o bleeds)          Anticoagulation Care Providers       Provider Role Specialty Phone number    Antonio Hadley Jr., MD Responsible Transplant [phone]

## 2023-09-07 ENCOUNTER — ANTI-COAG VISIT (OUTPATIENT)
Dept: CARDIOLOGY | Facility: CLINIC | Age: 58
End: 2023-09-07
Payer: MEDICARE

## 2023-09-07 ENCOUNTER — LAB VISIT (OUTPATIENT)
Dept: LAB | Facility: HOSPITAL | Age: 58
End: 2023-09-07
Payer: MEDICARE

## 2023-09-07 DIAGNOSIS — Z95.811 HEART REPLACED BY HEART ASSIST DEVICE: ICD-10-CM

## 2023-09-07 LAB
INR PPP: 3.8 (ref 0.8–1.2)
PROTHROMBIN TIME: 36.9 SEC (ref 9–12.5)

## 2023-09-07 PROCEDURE — 36415 COLL VENOUS BLD VENIPUNCTURE: CPT | Performed by: INTERNAL MEDICINE

## 2023-09-07 PROCEDURE — 93793 ANTICOAG MGMT PT WARFARIN: CPT | Mod: S$GLB,,,

## 2023-09-07 PROCEDURE — 85610 PROTHROMBIN TIME: CPT | Performed by: INTERNAL MEDICINE

## 2023-09-07 PROCEDURE — 93793 PR ANTICOAGULANT MGMT FOR PT TAKING WARFARIN: ICD-10-PCS | Mod: S$GLB,,,

## 2023-09-07 NOTE — PROGRESS NOTES
[facility]    09/07/2023 2:53 PM    Assessment/Plan:    Patient presents today with supratherapeutic INR.    Assessment of patient findings and chart review: INR trended up despite holding warfarin x 2 days; patient reports alcohol intake 9/6; has C 9/8    Recommendation for patient's warfarin regimen:  Hold dose again today + serving of greens    Recommend repeat INR tomorrow  _________________________________________________________________    Tim Richards (57 y.o.) is followed by the [facility].    Anticoagulation Summary  As of 9/7/2023      INR goal:  2.0-3.0   TTR:  69.7 % (4.9 y)   INR used for dosing:  3.8 (9/7/2023)   Warfarin maintenance plan:  7.5 mg (5 mg x 1.5) every Thu; 5 mg (5 mg x 1) all other days   Weekly warfarin total:  37.5 mg   Plan last modified:  Pearl Mendez, PharmD (8/31/2023)   Next INR check:  9/8/2023   Target end date:      Indications    LVAD (left ventricular assist device) present (Resolved) [Z95.811]  Anticoagulation monitoring  INR range 2-3 (Resolved) [Z79.01]                 Anticoagulation Episode Summary       INR check location:  Clinic Lab    Preferred lab:      Send INR reminders to:  [facility] COUMADIN LVAD    Comments:  LVAD// [facility]//Lab [place] Results: [phone] Acct# [id number] Phone: [phone]//dc 1/16/23 [facility] [phone], fax [phone] //pt declined meter, see 10/11/22 & 11/11/22 notes// Bridge:NO (h/o bleeds)          Anticoagulation Care Providers       Provider Role Specialty Phone number    Antonio Hadley Jr., MD Responsible Transplant [phone]

## 2023-09-07 NOTE — PROGRESS NOTES
Patient reports missing 9/6/2023 Warfarin dose, took correct Warfarin dose all other days, took Melatonin 9/6/2023, increased stress, [facility] 09/08/23, had 2 glasses of red wine 9/6/2023, no other changes reported.

## 2023-09-08 ENCOUNTER — ANTI-COAG VISIT (OUTPATIENT)
Dept: CARDIOLOGY | Facility: CLINIC | Age: 58
End: 2023-09-08
Payer: MEDICARE

## 2023-09-08 ENCOUNTER — HOSPITAL ENCOUNTER (OUTPATIENT)
Facility: HOSPITAL | Age: 58
Discharge: HOME | End: 2023-09-08
Attending: INTERNAL MEDICINE | Admitting: INTERNAL MEDICINE
Payer: MEDICARE

## 2023-09-08 VITALS
WEIGHT: 220.88 LBS | SYSTOLIC BLOOD PRESSURE: 90 MMHG | BODY MASS INDEX: 29.27 KG/M2 | HEIGHT: 73 IN | RESPIRATION RATE: 18 BRPM | TEMPERATURE: 98 F | OXYGEN SATURATION: 95 %

## 2023-09-08 DIAGNOSIS — Z95.811 LVAD (LEFT VENTRICULAR ASSIST DEVICE) PRESENT: ICD-10-CM

## 2023-09-08 PROCEDURE — C1894 INTRO/SHEATH, NON-LASER: HCPCS | Performed by: INTERNAL MEDICINE

## 2023-09-08 PROCEDURE — 25000003 PHARM REV CODE 250: Performed by: INTERNAL MEDICINE

## 2023-09-08 PROCEDURE — 93793 ANTICOAG MGMT PT WARFARIN: CPT | Mod: S$GLB,,,

## 2023-09-08 PROCEDURE — 93451 RIGHT HEART CATH: CPT | Performed by: INTERNAL MEDICINE

## 2023-09-08 PROCEDURE — C1751 CATH, INF, PER/CENT/MIDLINE: HCPCS | Performed by: INTERNAL MEDICINE

## 2023-09-08 PROCEDURE — 93451 PR RIGHT HEART CATH O2 SATURATION & CARDIAC OUTPUT: ICD-10-PCS | Mod: 26,,, | Performed by: INTERNAL MEDICINE

## 2023-09-08 PROCEDURE — 93451 RIGHT HEART CATH: CPT | Mod: 26,,, | Performed by: INTERNAL MEDICINE

## 2023-09-08 PROCEDURE — 93793 PR ANTICOAGULANT MGMT FOR PT TAKING WARFARIN: ICD-10-PCS | Mod: S$GLB,,,

## 2023-09-08 RX ORDER — FUROSEMIDE 40 MG/1
40 TABLET ORAL
Qty: 30 TABLET | Refills: 5
Start: 2023-09-12 | End: 2023-10-24 | Stop reason: SDUPTHER

## 2023-09-08 RX ORDER — OXYCODONE AND ACETAMINOPHEN 5; 325 MG/1; MG/1
1 TABLET ORAL ONCE
Status: COMPLETED | OUTPATIENT
Start: 2023-09-08 | End: 2023-09-08

## 2023-09-08 RX ORDER — LIDOCAINE HYDROCHLORIDE 20 MG/ML
INJECTION, SOLUTION EPIDURAL; INFILTRATION; INTRACAUDAL; PERINEURAL
Status: DISCONTINUED | OUTPATIENT
Start: 2023-09-08 | End: 2023-09-08 | Stop reason: HOSPADM

## 2023-09-08 RX ADMIN — OXYCODONE HYDROCHLORIDE AND ACETAMINOPHEN 1 TABLET: 5; 325 TABLET ORAL at 11:09

## 2023-09-08 NOTE — DISCHARGE INSTRUCTIONS

## 2023-09-08 NOTE — OP NOTE
Patient was brought to cath lab, draped, and prepped in the usual sterile fashion. Right IJ was accessed via modified seldinger technique with ultrasound guidance and guidewire was introduced into the IJ. Sequential dilation with micropuncture kit and sheath was performed. Basom Tito catheter was introduced via the sheath. Measurements were obtained, and sheath was removed. Patient tolerated the procedure well and discharged in stable condition.

## 2023-09-08 NOTE — DISCHARGE SUMMARY
[facility] - Cath Lab  Discharge Note  Short Stay    Procedure(s) (LRB):  INSERTION, CATHETER, RIGHT HEART (Right)      OUTCOME: Patient tolerated treatment/procedure well without complication and is now ready for discharge.    DISPOSITION: Home or Self Care    FINAL DIAGNOSIS:  <principal problem not specified>    FOLLOWUP: In clinic    DISCHARGE INSTRUCTIONS:  No discharge procedures on file.     TIME SPENT ON DISCHARGE: 30 minutes

## 2023-09-08 NOTE — PLAN OF CARE
Received pt to floor from home accompanied by spouse.  AAO x 4. Denies pain or discomfort. Respirations even and unlabored. No distress noted. Pt stable.  Admit assessment complete.  Pt oriented to room and call bell placed within reach.  Will continue to monitor.

## 2023-09-08 NOTE — Clinical Note
The PA catheter was repositioned to the right pulmonary artery. Hemodynamics were performed. O2 saturation was measured at 68%. CO 5.06    CI   2.26

## 2023-09-08 NOTE — PLAN OF CARE
Patient aao x4. Vss.  Right neck with gauze/tegaderm dressing intact. No bleeding noted. Percocet administered with mild relief. Chest xray completed. Dr. Michael shelbyed discharge. AVS printed. Home care instructions reviewed with patient and spouse. Questions answered. Patient ambulatory for discharge.

## 2023-09-08 NOTE — PROGRESS NOTES
[facility]    2023 8:33 AM    Assessment/Plan:    Patient presents today with therapeutic INR.    Assessment of patient findings and chart review: INR therapeutic but not in the target range for RHC today; patient has held warfarin for the last 3 days    Recommendation for patient's warfarin regimen: Decrease maintenance dose    Recommend repeat INR Monday  _________________________________________________________________    Tim Richards (57 y.o.) is followed by the [facility].    Anticoagulation Summary  As of 2023      INR goal:  2.0-3.0   TTR:  69.7 % (4.9 y)   INR used for dosin.9 (2023)   Warfarin maintenance plan:  2.5 mg (5 mg x 0.5) every Sun; 5 mg (5 mg x 1) all other days   Weekly warfarin total:  32.5 mg   Plan last modified:  Pearl Mendez, PharmD (2023)   Next INR check:  2023   Target end date:      Indications    LVAD (left ventricular assist device) present (Resolved) [Z95.811]  Anticoagulation monitoring  INR range 2-3 (Resolved) [Z79.01]                 Anticoagulation Episode Summary       INR check location:  Clinic Lab    Preferred lab:      Send INR reminders to:  [facility] COUMADIN LVAD    Comments:  LVAD// [facility]//Lab [place] Results: [phone] [facility]# [id number] Phone: [phone]//dc 23 [place] -[phone], fax [phone] //pt declined meter, see 10/11/22 & 22 notes// Bridge:NO (h/o bleeds)          Anticoagulation Care Providers       Provider Role Specialty Phone number    Antonio Hadley Jr., MD Responsible Transplant [phone]

## 2023-09-09 DIAGNOSIS — I50.42 CHRONIC COMBINED SYSTOLIC AND DIASTOLIC HEART FAILURE: Chronic | ICD-10-CM

## 2023-09-11 ENCOUNTER — ANTI-COAG VISIT (OUTPATIENT)
Dept: CARDIOLOGY | Facility: CLINIC | Age: 58
End: 2023-09-11
Payer: MEDICARE

## 2023-09-11 ENCOUNTER — LAB VISIT (OUTPATIENT)
Dept: LAB | Facility: HOSPITAL | Age: 58
End: 2023-09-11
Attending: INTERNAL MEDICINE
Payer: MEDICARE

## 2023-09-11 DIAGNOSIS — T46.2X1A HYPOTHYROIDISM DUE TO AMIODARONE: Primary | ICD-10-CM

## 2023-09-11 DIAGNOSIS — Z79.01 LONG TERM (CURRENT) USE OF ANTICOAGULANTS: ICD-10-CM

## 2023-09-11 DIAGNOSIS — Z95.811 LVAD (LEFT VENTRICULAR ASSIST DEVICE) PRESENT: Chronic | ICD-10-CM

## 2023-09-11 DIAGNOSIS — E03.2 HYPOTHYROIDISM DUE TO AMIODARONE: Primary | ICD-10-CM

## 2023-09-11 LAB
ALBUMIN SERPL BCP-MCNC: 3.9 G/DL (ref 3.5–5.2)
ALP SERPL-CCNC: 112 U/L (ref 55–135)
ALT SERPL W/O P-5'-P-CCNC: 48 U/L (ref 10–44)
ANION GAP SERPL CALC-SCNC: 9 MMOL/L (ref 8–16)
AST SERPL-CCNC: 62 U/L (ref 10–40)
BILIRUB SERPL-MCNC: 0.3 MG/DL (ref 0.1–1)
BNP SERPL-MCNC: 557 PG/ML (ref 0–99)
BUN SERPL-MCNC: 26 MG/DL (ref 6–20)
CALCIUM SERPL-MCNC: 8.9 MG/DL (ref 8.7–10.5)
CHLORIDE SERPL-SCNC: 103 MMOL/L (ref 95–110)
CO2 SERPL-SCNC: 25 MMOL/L (ref 23–29)
CREAT SERPL-MCNC: 2.1 MG/DL (ref 0.5–1.4)
EST. GFR  (NO RACE VARIABLE): 36 ML/MIN/1.73 M^2
GLUCOSE SERPL-MCNC: 101 MG/DL (ref 70–110)
INR PPP: 2.8 (ref 0.8–1.2)
POTASSIUM SERPL-SCNC: 4.5 MMOL/L (ref 3.5–5.1)
PROT SERPL-MCNC: 7.8 G/DL (ref 6–8.4)
PROTHROMBIN TIME: 28 SEC (ref 9–12.5)
SODIUM SERPL-SCNC: 137 MMOL/L (ref 136–145)

## 2023-09-11 PROCEDURE — 85610 PROTHROMBIN TIME: CPT | Performed by: INTERNAL MEDICINE

## 2023-09-11 PROCEDURE — 83880 ASSAY OF NATRIURETIC PEPTIDE: CPT | Performed by: INTERNAL MEDICINE

## 2023-09-11 PROCEDURE — 93793 PR ANTICOAGULANT MGMT FOR PT TAKING WARFARIN: ICD-10-PCS | Mod: S$GLB,,,

## 2023-09-11 PROCEDURE — 93793 ANTICOAG MGMT PT WARFARIN: CPT | Mod: S$GLB,,,

## 2023-09-11 PROCEDURE — 80053 COMPREHEN METABOLIC PANEL: CPT | Performed by: INTERNAL MEDICINE

## 2023-09-11 RX ORDER — MIRTAZAPINE 30 MG/1
30 TABLET, FILM COATED ORAL NIGHTLY
Qty: 90 TABLET | Refills: 0 | Status: SHIPPED | OUTPATIENT
Start: 2023-09-11 | End: 2023-10-19 | Stop reason: SDUPTHER

## 2023-09-11 RX ORDER — LEVOTHYROXINE SODIUM 125 UG/1
125 TABLET ORAL
Qty: 30 EACH | Refills: 11 | Status: SHIPPED | OUTPATIENT
Start: 2023-09-11 | End: 2023-11-20 | Stop reason: SDUPTHER

## 2023-09-12 ENCOUNTER — TELEPHONE (OUTPATIENT)
Dept: TRANSPLANT | Facility: CLINIC | Age: 58
End: 2023-09-12
Payer: MEDICARE

## 2023-09-12 DIAGNOSIS — Z95.811 LVAD (LEFT VENTRICULAR ASSIST DEVICE) PRESENT: Chronic | ICD-10-CM

## 2023-09-12 DIAGNOSIS — I47.20 VT (VENTRICULAR TACHYCARDIA): ICD-10-CM

## 2023-09-12 DIAGNOSIS — I50.42 CHRONIC COMBINED SYSTOLIC AND DIASTOLIC HEART FAILURE: Chronic | ICD-10-CM

## 2023-09-12 RX ORDER — AMIODARONE HYDROCHLORIDE 200 MG/1
400 TABLET ORAL DAILY
Qty: 180 TABLET | Refills: 3 | Status: SHIPPED | OUTPATIENT
Start: 2023-09-12 | End: 2024-09-11

## 2023-09-12 NOTE — TELEPHONE ENCOUNTER
F/u labs, decreased diuretics after RHC. Cr improved. AST/ALT 62/48 slightly elevated. Patient reports compliance with all medications. States he took a few tylenol d/t pain after RHC but not more than 3 doses in the past week. Reports weight has been stable at 221lb, denies shortness of breath, orthopnea, swelling or chest pain. Will discuss with MD.    Discussed with Dr Rodriguez, no changes at this time. Will continue to monitor.

## 2023-09-18 ENCOUNTER — PATIENT MESSAGE (OUTPATIENT)
Dept: TRANSPLANT | Facility: CLINIC | Age: 58
End: 2023-09-18
Payer: MEDICARE

## 2023-09-19 ENCOUNTER — LAB VISIT (OUTPATIENT)
Dept: LAB | Facility: HOSPITAL | Age: 58
End: 2023-09-19
Attending: INTERNAL MEDICINE
Payer: MEDICARE

## 2023-09-19 ENCOUNTER — ANTI-COAG VISIT (OUTPATIENT)
Dept: CARDIOLOGY | Facility: CLINIC | Age: 58
End: 2023-09-19
Payer: MEDICARE

## 2023-09-19 DIAGNOSIS — Z95.811 LVAD (LEFT VENTRICULAR ASSIST DEVICE) PRESENT: ICD-10-CM

## 2023-09-19 DIAGNOSIS — Z79.01 LONG TERM (CURRENT) USE OF ANTICOAGULANTS: ICD-10-CM

## 2023-09-19 LAB
INR PPP: 2.2 (ref 0.8–1.2)
PROTHROMBIN TIME: 21.9 SEC (ref 9–12.5)

## 2023-09-19 PROCEDURE — 85610 PROTHROMBIN TIME: CPT | Performed by: INTERNAL MEDICINE

## 2023-09-19 PROCEDURE — 36415 COLL VENOUS BLD VENIPUNCTURE: CPT | Performed by: INTERNAL MEDICINE

## 2023-09-19 PROCEDURE — 93793 ANTICOAG MGMT PT WARFARIN: CPT | Mod: S$GLB,,,

## 2023-09-19 PROCEDURE — 93793 PR ANTICOAGULANT MGMT FOR PT TAKING WARFARIN: ICD-10-PCS | Mod: S$GLB,,,

## 2023-09-19 NOTE — PROGRESS NOTES
09/19/2023-Spoke to patient regarding 9/18/23 missed INR. Patient stated he didn't see an appointment for 9/18/23 and will go for INR today.

## 2023-09-19 NOTE — PROGRESS NOTES
[facility]    2023 3:18 PM    Assessment/Plan:    Patient presents today with therapeutic INR.    Assessment of patient findings and chart review: no significant findings     Recommendation for patient's warfarin regimen: Continue current maintenance dose    Recommend repeat INR in 1 week  _________________________________________________________________    Tim Richards (57 y.o.) is followed by the [facility].    Anticoagulation Summary  As of 2023      INR goal:  2.0-3.0   TTR:  69.9 % (4.9 y)   INR used for dosin.2 (2023)   Warfarin maintenance plan:  2.5 mg (5 mg x 0.5) every Sun; 5 mg (5 mg x 1) all other days   Weekly warfarin total:  32.5 mg   Plan last modified:  Pearl Mendez, PharmD (2023)   Next INR check:  2023   Target end date:      Indications    LVAD (left ventricular assist device) present (Resolved) [Z95.811]  Anticoagulation monitoring  INR range 2-3 (Resolved) [Z79.01]                 Anticoagulation Episode Summary       INR check location:  Clinic Lab    Preferred lab:      Send INR reminders to:  [facility] COUMADIN LVAD    Comments:  LVAD// [facility]//Lab [place] Results: [phone] [facility]# [id number] Phone: [phone]//dc 23 [facility] [phone], fax [phone] //pt declined meter, see 10/11/22 & 22 notes// Bridge:NO (h/o bleeds)          Anticoagulation Care Providers       Provider Role Specialty Phone number    Antonio Hadley Jr., MD Responsible Transplant [phone]

## 2023-09-26 ENCOUNTER — LAB VISIT (OUTPATIENT)
Dept: LAB | Facility: HOSPITAL | Age: 58
End: 2023-09-26
Attending: INTERNAL MEDICINE
Payer: MEDICARE

## 2023-09-26 ENCOUNTER — ANTI-COAG VISIT (OUTPATIENT)
Dept: CARDIOLOGY | Facility: CLINIC | Age: 58
End: 2023-09-26
Payer: MEDICARE

## 2023-09-26 DIAGNOSIS — Z79.01 LONG TERM (CURRENT) USE OF ANTICOAGULANTS: ICD-10-CM

## 2023-09-26 DIAGNOSIS — Z95.811 LVAD (LEFT VENTRICULAR ASSIST DEVICE) PRESENT: ICD-10-CM

## 2023-09-26 LAB
INR PPP: 2.3 (ref 0.8–1.2)
PROTHROMBIN TIME: 23.2 SEC (ref 9–12.5)

## 2023-09-26 PROCEDURE — 93793 ANTICOAG MGMT PT WARFARIN: CPT | Mod: S$GLB,,,

## 2023-09-26 PROCEDURE — 93793 PR ANTICOAGULANT MGMT FOR PT TAKING WARFARIN: ICD-10-PCS | Mod: S$GLB,,,

## 2023-09-26 PROCEDURE — 36415 COLL VENOUS BLD VENIPUNCTURE: CPT | Performed by: INTERNAL MEDICINE

## 2023-09-26 PROCEDURE — 85610 PROTHROMBIN TIME: CPT | Performed by: INTERNAL MEDICINE

## 2023-09-26 NOTE — PROGRESS NOTES
[facility]    2023 4:04 PM    Assessment/Plan:    Patient presents today with therapeutic INR.    Assessment of patient findings and chart review: no significant findings     Recommendation for patient's warfarin regimen: Continue current maintenance dose    Recommend repeat INR in 1 week  _________________________________________________________________    Tim Richards (57 y.o.) is followed by the [facility].    Anticoagulation Summary  As of 2023      INR goal:  2.0-3.0   TTR:  70.0 % (5 y)   INR used for dosin.3 (2023)   Warfarin maintenance plan:  2.5 mg (5 mg x 0.5) every Sun; 5 mg (5 mg x 1) all other days   Weekly warfarin total:  32.5 mg   Plan last modified:  Pearl Mendez, PharmD (2023)   Next INR check:  10/3/2023   Target end date:      Indications    LVAD (left ventricular assist device) present (Resolved) [Z95.811]  Anticoagulation monitoring  INR range 2-3 (Resolved) [Z79.01]                 Anticoagulation Episode Summary       INR check location:  Clinic Lab    Preferred lab:      Send INR reminders to:  [facility] COUMADIN LVAD    Comments:  LVAD// [facility]//Lab [place] Results: [phone] [facility]# [id number] Phone: [phone]//dc 23 [facility] [phone], fax [phone] //pt declined meter, see 10/11/22 & 22 notes// Bridge:NO (h/o bleeds)          Anticoagulation Care Providers       Provider Role Specialty Phone number    Antonio Hadley Jr., MD Responsible Transplant [phone]

## 2023-10-02 ENCOUNTER — PATIENT MESSAGE (OUTPATIENT)
Dept: FAMILY MEDICINE | Facility: CLINIC | Age: 58
End: 2023-10-02
Payer: MEDICARE

## 2023-10-03 ENCOUNTER — ANTI-COAG VISIT (OUTPATIENT)
Dept: CARDIOLOGY | Facility: CLINIC | Age: 58
End: 2023-10-03
Payer: MEDICARE

## 2023-10-03 ENCOUNTER — LAB VISIT (OUTPATIENT)
Dept: LAB | Facility: HOSPITAL | Age: 58
End: 2023-10-03
Attending: INTERNAL MEDICINE
Payer: MEDICARE

## 2023-10-03 DIAGNOSIS — Z95.811 LVAD (LEFT VENTRICULAR ASSIST DEVICE) PRESENT: ICD-10-CM

## 2023-10-03 DIAGNOSIS — Z79.01 LONG TERM (CURRENT) USE OF ANTICOAGULANTS: ICD-10-CM

## 2023-10-03 LAB
INR PPP: 2.7 (ref 0.8–1.2)
PROTHROMBIN TIME: 26.8 SEC (ref 9–12.5)

## 2023-10-03 PROCEDURE — 85610 PROTHROMBIN TIME: CPT | Performed by: INTERNAL MEDICINE

## 2023-10-03 PROCEDURE — 36415 COLL VENOUS BLD VENIPUNCTURE: CPT | Performed by: INTERNAL MEDICINE

## 2023-10-03 PROCEDURE — 93793 PR ANTICOAGULANT MGMT FOR PT TAKING WARFARIN: ICD-10-PCS | Mod: S$GLB,,,

## 2023-10-03 PROCEDURE — 93793 ANTICOAG MGMT PT WARFARIN: CPT | Mod: S$GLB,,,

## 2023-10-03 NOTE — PROGRESS NOTES
[facility]    10/03/2023 2:55 PM    Assessment/Plan:    Patient presents today with therapeutic INR.    Assessment of patient findings and chart review: no significant findings     Recommendation for patient's warfarin regimen: Continue current maintenance dose    Recommend repeat INR in 1 week  _________________________________________________________________    Tim Richards (57 y.o.) is followed by the [facility].    Anticoagulation Summary  As of 10/3/2023      INR goal:  2.0-3.0   TTR:  70.1 % (5 y)   INR used for dosin.7 (10/3/2023)   Warfarin maintenance plan:  2.5 mg (5 mg x 0.5) every Sun; 5 mg (5 mg x 1) all other days   Weekly warfarin total:  32.5 mg   Plan last modified:  Pearl Mendez, PharmD (2023)   Next INR check:  10/10/2023   Target end date:      Indications    LVAD (left ventricular assist device) present (Resolved) [Z95.811]  Anticoagulation monitoring  INR range 2-3 (Resolved) [Z79.01]                 Anticoagulation Episode Summary       INR check location:  Clinic Lab    Preferred lab:      Send INR reminders to:  [facility] COUMADIN LVAD    Comments:  LVAD// [facility] Lab//Lab [place] Results: [phone] [facility]# [id number] Phone: [phone] [phone]//dc [facility] [phone], fax [phone] //pt declined meter, see 10/11/22 & 22 notes// Bridge:NO (h/o bleeds)          Anticoagulation Care Providers       Provider Role Specialty Phone number    Antonio Hadley Jr., MD Responsible Transplant [phone]

## 2023-10-05 ENCOUNTER — OFFICE VISIT (OUTPATIENT)
Dept: FAMILY MEDICINE | Facility: CLINIC | Age: 58
End: 2023-10-05
Payer: MEDICARE

## 2023-10-05 DIAGNOSIS — G47.00 INSOMNIA, UNSPECIFIED TYPE: ICD-10-CM

## 2023-10-05 DIAGNOSIS — D59.8 OTHER ACQUIRED HEMOLYTIC ANEMIAS: ICD-10-CM

## 2023-10-05 DIAGNOSIS — Z00.00 ANNUAL PHYSICAL EXAM: Primary | ICD-10-CM

## 2023-10-05 PROCEDURE — 99396 PR PREVENTIVE VISIT,EST,40-64: ICD-10-PCS | Mod: GZ,S$GLB,, | Performed by: FAMILY MEDICINE

## 2023-10-05 PROCEDURE — 99396 PREV VISIT EST AGE 40-64: CPT | Mod: GZ,S$GLB,, | Performed by: FAMILY MEDICINE

## 2023-10-05 RX ORDER — ZOLPIDEM TARTRATE 10 MG/1
10 TABLET ORAL NIGHTLY PRN
Qty: 30 TABLET | Refills: 0 | Status: SHIPPED | OUTPATIENT
Start: 2023-10-05 | End: 2024-04-04

## 2023-10-05 NOTE — PROGRESS NOTES
Chief Complaint   Patient presents with    Annual Exam       SUBJECTIVE:   Tim Richards is a 57 y.o. male presenting for his annual checkup.   Current Outpatient Medications   Medication Sig Dispense Refill    ALPRAZolam (XANAX) 1 MG tablet Take 0.5-1 tablets (0.5-1 mg total) by mouth 2 (two) times daily as needed for Anxiety. 30 tablet 2    amiodarone (PACERONE) 200 MG Tab Take 2 tablets (400 mg total) by mouth once daily. 180 tablet 3    amLODIPine (NORVASC) 10 MG tablet Take 1 tablet (10 mg total) by mouth once daily. 30 tablet 11    cyanocobalamin 1,000 mcg/mL injection Inject 1 mL (1,000 mcg total) into the skin once a week. for 12 doses 10 mL 1    ferrous gluconate (FERGON) 324 MG tablet Take 1 tablet (324 mg total) by mouth 2 (two) times daily with meals. 60 tablet 11    furosemide (LASIX) 40 MG tablet Take 1 tablet (40 mg total) by mouth every Tues, Thurs. 30 tablet 5    levothyroxine (SYNTHROID) 125 MCG tablet Take 1 tablet (125 mcg total) by mouth before breakfast. 30 each 11    magnesium oxide (MAG-OX) 400 mg (241.3 mg magnesium) tablet Take 1 tablet (400 mg total) by mouth 2 (two) times daily. 90 tablet 11    mexiletine (MEXITIL) 250 MG Cap Take 1 capsule (250 mg total) by mouth every 8 (eight) hours. 90 capsule 11    mirtazapine (REMERON) 30 MG tablet TAKE 1 TABLET BY MOUTH EVERY EVENING. 90 tablet 0    omega-3 acid ethyl esters (LOVAZA) 1 gram capsule Take 2 capsules (2 g total) by mouth 2 (two) times daily. 360 capsule 3    pantoprazole (PROTONIX) 40 MG tablet Take 1 tablet (40 mg total) by mouth daily with lunch. 90 tablet 3    potassium chloride SA (K-DUR,KLOR-CON) 20 MEQ tablet Take 1 tablet with every lasix 40 mg dose 30 tablet 5    warfarin (COUMADIN) 5 MG tablet Take a half tablet (2.5mg) by mouth on 10/5 only. Then take 1 tablet (5mg) daily 135 tablet 3    zolpidem (AMBIEN CR) 12.5 MG CR tablet Take 1 tablet (12.5 mg total) by mouth nightly as needed for Insomnia. 30 tablet 0    zolpidem  (AMBIEN) 10 mg Tab Take 1 tablet (10 mg total) by mouth nightly as needed (insomnia). 30 tablet 0     No current facility-administered medications for this visit.     Allergies: Lisinopril, Hydralazine, and Hydralazine analogues   Patient Active Problem List    Diagnosis Date Noted    Other acquired hemolytic anemias 10/11/2023    Decreased functional activity tolerance 05/05/2023    Decreased strength 05/05/2023    Overweight with body mass index (BMI) of 28 to 28.9 in adult 04/13/2023    Poor balance 04/13/2023    Physical debility 04/13/2023    PVD (peripheral vascular disease) 03/02/2023    Postprocedural seroma of a circulatory system organ or structure following other circulatory system procedure 12/03/2022    Other specified hypothyroidism 12/03/2022    Mycotic aneurysm 09/25/2022    Hematoma of groin 09/24/2022    Pseudoaneurysm of right femoral artery 09/24/2022    Left ventricular assist device (LVAD) complication, subsequent encounter 09/22/2022    History of COVID-19 09/22/2022    Impaired mobility 08/30/2022    Stage 3b chronic kidney disease 08/28/2022    Hypertensive cardiovascular-renal disease, stage 1-4 or unspecified chronic kidney disease, with heart failure 08/28/2022    High risk medications (not anticoagulants) long-term use 08/28/2022    Dilated cardiomyopathy 08/28/2022    Anticoagulation monitoring, INR range 2-3 08/28/2022    Adjustment disorder with mixed anxiety and depressed mood 08/13/2022    Atrial flutter 07/30/2022    History of ventricular tachycardia     Post traumatic stress disorder (PTSD)     CHICHI (obstructive sleep apnea) 06/05/2020    Substance or medication-induced sleep disorder, insomnia type 02/07/2020    Amiodarone-induced hyperthyroidism 01/13/2020    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 No dyspnea or chest pain on exertion. No abdominal pain, change in bowel habits, black or bloody stools. No urinary tract or prostatic symptoms. No neurological complaints.  Severe insomnia  OBJECTIVE:   The patient appears well, alert, oriented x 3, in no distress.   There were no vitals taken for this visit.  Wt Readings from Last 5 Encounters:   09/08/23 100.2 kg (220 lb 14.4 oz)   08/31/23 103.9 kg (229 lb)   08/31/23 100.2 kg (221 lb)   08/18/23 99.3 kg (219 lb)   07/24/23 99.3 kg (219 lb)     Wt Readings from Last 25 Encounters:   09/08/23 100.2 kg (220 lb 14.4 oz)   08/31/23 103.9 kg (229 lb)   08/31/23 100.2 kg (221 lb)   08/18/23 99.3 kg (219 lb)   07/24/23 99.3 kg (219 lb)   05/31/23 99.6 kg (219 lb 9.6 oz)   04/13/23 98.9 kg (218 lb)   03/17/23 102.5 kg (225 lb 15.5 oz)   03/02/23 97 kg (213 lb 13.5 oz)   02/23/23 95 kg (209 lb 7.7 oz)   02/01/23 95.7 kg (210 lb 15.7 oz)   01/29/23 90.9 kg (200 lb 6.4 oz)   01/12/23 98.4 kg (217 lb)   01/06/23 100.2 kg (220 lb 14.4 oz)   01/03/23 99.3 kg (218 lb 14.7 oz)   12/05/22 91.6 kg (202 lb)   12/03/22 93.8 kg (206 lb 12.7 oz)   12/01/22 93.4 kg (206 lb)   11/23/22 97.1 kg (214 lb)   11/23/22 98 kg (216 lb 0.8 oz)   11/09/22 97.3 kg (214 lb 6.4 oz)   11/03/22 94.3 kg (208 lb)   11/03/22 94.8 kg (209 lb)   11/03/22 94.3 kg (208 lb)   10/04/22 89.4 kg (197 lb 1.5 oz)       ENT normal.  Neck supple. No adenopathy or thyromegaly. RICK. Lungs are clear, good air entry, no wheezes, rhonchi or rales severe cardiac changes with LVAD Abdomen is soft without tenderness, guarding, mass or organomegaly.  exam:  deferred.  Extremities show no edema, normal peripheral pulses. Neurological is normal without focal findings.    ASSESSMENT:   1. Annual physical exam    2. Insomnia, unspecified type    3. Other acquired hemolytic anemias          PLAN:   Counseled on age appropriate medical preventative services, including age appropriate cancer screenings, over all nutritional health, need for a consistent exercise regimen and an over all push towards maintaining a vigorous and active lifestyle.  Counseled on age appropriate vaccines and discussed upcoming health care needs based on age/gender.  Spent time with patient counseling on need for a good patient/doctor relationship moving forward.  Discussed use of common OTC medications and supplements.  Discussed common dietary aids and use of caffeine and the need for good sleep hygiene and stress management.    Problem List Items Addressed This Visit       Other acquired hemolytic anemias    Current Assessment & Plan     With LVAD  Monitor closely for worsening.            Other Visit Diagnoses       Annual physical exam    -  Primary    Insomnia, unspecified type        Relevant Medications    zolpidem (AMBIEN) 10 mg Tab

## 2023-10-10 ENCOUNTER — ANTI-COAG VISIT (OUTPATIENT)
Dept: CARDIOLOGY | Facility: CLINIC | Age: 58
End: 2023-10-10
Payer: MEDICARE

## 2023-10-10 ENCOUNTER — LAB VISIT (OUTPATIENT)
Dept: LAB | Facility: HOSPITAL | Age: 58
End: 2023-10-10
Attending: INTERNAL MEDICINE
Payer: MEDICARE

## 2023-10-10 DIAGNOSIS — Z95.811 LVAD (LEFT VENTRICULAR ASSIST DEVICE) PRESENT: ICD-10-CM

## 2023-10-10 DIAGNOSIS — Z79.01 LONG TERM (CURRENT) USE OF ANTICOAGULANTS: ICD-10-CM

## 2023-10-10 LAB
INR PPP: 1.8 (ref 0.8–1.2)
PROTHROMBIN TIME: 18.5 SEC (ref 9–12.5)

## 2023-10-10 PROCEDURE — 36415 COLL VENOUS BLD VENIPUNCTURE: CPT | Performed by: INTERNAL MEDICINE

## 2023-10-10 PROCEDURE — 93793 PR ANTICOAGULANT MGMT FOR PT TAKING WARFARIN: ICD-10-PCS | Mod: S$GLB,,,

## 2023-10-10 PROCEDURE — 93793 ANTICOAG MGMT PT WARFARIN: CPT | Mod: S$GLB,,,

## 2023-10-10 PROCEDURE — 85610 PROTHROMBIN TIME: CPT | Performed by: INTERNAL MEDICINE

## 2023-10-10 NOTE — PROGRESS NOTES
[facility]    10/10/2023 3:57 PM    Assessment/Plan:    Patient presents today with subtherapeutic  INR.    Assessment of patient findings and chart review: Reports broccoli intake one day last week    Recommendation for patient's warfarin regimen: Boost dose today to 7.5mg then resume current maintenance dose    Recommend repeat INR in 1 week  _________________________________________________________________    Tim Richards (57 y.o.) is followed by the [facility].    Anticoagulation Summary  As of 10/10/2023      INR goal:  2.0-3.0   TTR:  70.1 % (5 y)   INR used for dosin.8 (10/10/2023)   Warfarin maintenance plan:  2.5 mg (5 mg x 0.5) every Sun; 5 mg (5 mg x 1) all other days   Weekly warfarin total:  32.5 mg   Plan last modified:  Pearl Mendez, PharmD (2023)   Next INR check:  10/17/2023   Target end date:      Indications    LVAD (left ventricular assist device) present (Resolved) [Z95.811]  Anticoagulation monitoring  INR range 2-3 (Resolved) [Z79.01]                 Anticoagulation Episode Summary       INR check location:  Clinic Lab    Preferred lab:      Send INR reminders to:  [facility] COUMADIN LVAD    Comments:  LVAD// [facility]//Lab [place] Results: [phone] [facility]# [id number] Phone: [phone] [phone]//dc [facility] [phone], fax [phone] //pt declined meter, see 10/11/22 & 22 notes// Bridge:NO (h/o bleeds)          Anticoagulation Care Providers       Provider Role Specialty Phone number    Antonio Hadley Jr., MD Responsible Transplant [phone]

## 2023-10-10 NOTE — PROGRESS NOTES
Patient reports correct Warfarin dose, started taking Ambien or 4 days ago, broccoli one day last week(unsure of date), no other changes reported.

## 2023-10-11 DIAGNOSIS — Z79.01 ANTICOAGULATION MONITORING, INR RANGE 2-3: ICD-10-CM

## 2023-10-11 DIAGNOSIS — I50.42 CHRONIC COMBINED SYSTOLIC AND DIASTOLIC HEART FAILURE: Chronic | ICD-10-CM

## 2023-10-11 DIAGNOSIS — E53.8 B12 DEFICIENCY: ICD-10-CM

## 2023-10-11 DIAGNOSIS — Z95.811 LVAD (LEFT VENTRICULAR ASSIST DEVICE) PRESENT: Chronic | ICD-10-CM

## 2023-10-11 PROBLEM — D59.8 OTHER ACQUIRED HEMOLYTIC ANEMIAS: Status: ACTIVE | Noted: 2023-10-11

## 2023-10-11 RX ORDER — ZOLPIDEM TARTRATE 12.5 MG/1
12.5 TABLET, FILM COATED, EXTENDED RELEASE ORAL NIGHTLY PRN
Qty: 30 TABLET | Refills: 0 | Status: SHIPPED | OUTPATIENT
Start: 2023-10-11 | End: 2023-11-16

## 2023-10-11 RX ORDER — CYANOCOBALAMIN 1000 UG/ML
1000 INJECTION, SOLUTION INTRAMUSCULAR; SUBCUTANEOUS WEEKLY
Qty: 10 ML | Refills: 1 | Status: SHIPPED | OUTPATIENT
Start: 2023-10-11 | End: 2023-12-04

## 2023-10-12 NOTE — PT/OT/SLP PROGRESS
Occupational Therapy   Co-Treatment    Name: Tim Richards  MRN: [id number]  Admitting Diagnosis:  Left ventricular assist device (LVAD) complication  14 Days Post-Op    Recommendations:     Discharge Recommendations: rehabilitation facility  Discharge Equipment Recommendations:   (TBD)  Barriers to discharge:  Decreased caregiver support, Inaccessible home environment    Assessment:     Tim Richards is a 56 y.o. male with a medical diagnosis of Left ventricular assist device (LVAD) complication.  He presents with progress towards goals as evidenced by decreased assistance needed with sit to stand transfers and increased standing time. Performance deficits affecting function are weakness, impaired balance, impaired endurance, impaired self care skills, impaired functional mobility, gait instability, decreased lower extremity function, decreased upper extremity function, impaired cardiopulmonary response to activity. Co-evaluation/treatment performed due to patient's multiple deficits requiring two skilled therapists to appropriately and safely assess patient's strength and endurance while facilitating functional tasks in addition to accommodating for patient's activity tolerance.     Rehab Prognosis:  Good; patient would benefit from acute skilled OT services to address these deficits and reach maximum level of function.       Plan:     Patient to be seen 5 x/week to address the above listed problems via self-care/home management, therapeutic activities, therapeutic exercises, neuromuscular re-education  · Plan of Care Expires: 08/25/22  · Plan of Care Reviewed with: patient    Subjective     Pain/Comfort:  · Pain Rating 1: 0/10    Objective:     Communicated with: RN prior to session.  Patient found supine with telemetry, peripheral IV, sun catheter, LVAD, NG tube, Trach collar, PMV upon OT entry to room.    General Precautions: Standard, fall, LVAD, sternal   Orthopedic Precautions:N/A   Braces:     Respiratory Status: trach collar     Occupational Performance:     Bed Mobility:    · Patient completed Supine to Sit with stand by assistance  · Patient completed Sit to Supine with stand by assistance     Functional Mobility/Transfers:  · Patient completed Sit <> Stand Transfer with moderate assistance   · Functional Mobility: Max A for sidesteps along EOB    Activities of Daily Living:  · Grooming: independence seated EOB  · Lower Body Dressing: minimum assistance donning socks seated EOB; Max A for stand phase simulated  · Toileting: maximal assistance for pericare in standing      AMPAC 6 Click ADL: 18    Treatment & Education:  Pt ed on OT POC  Pt ed on ROM ex's for increased overall strength and endurance    Patient left HOB elevated with all lines intact, call button in reach and RN notifiedEducation:      GOALS:   Multidisciplinary Problems     Occupational Therapy Goals        Problem: Occupational Therapy    Goal Priority Disciplines Outcome Interventions   Occupational Therapy Goal     OT, PT/OT Ongoing, Progressing    Description: Goals to be met by: 8/9/22     Patient will increase functional independence with ADLs by performing:    UE Dressing with Stand-by Assistance.  LE Dressing with Stand-by Assistance.  Grooming while standing at sink with Stand-by Assistance.  Toileting from toilet with Stand-by Assistance for hygiene and clothing management.   Toilet transfer to toilet with Stand-by Assistance.               Multidisciplinary Problems (Resolved)        Problem: Occupational Therapy    Goal Priority Disciplines Outcome Interventions   Occupational Therapy Goal   (Resolved)     OT, PT/OT Met           Problem: Occupational Therapy    Goal Priority Disciplines Outcome Interventions   Occupational Therapy Goal   (Resolved)     OT, PT/OT Met                    Time Tracking:     OT Date of Treatment: 08/18/22  OT Start Time: 1017  OT Stop Time: 1041  OT Total Time (min): 24 min    Billable  Minutes:Self Care/Home Management 10  Therapeutic Exercise 13               8/18/2022     Left arm;

## 2023-10-17 ENCOUNTER — ANTI-COAG VISIT (OUTPATIENT)
Dept: CARDIOLOGY | Facility: CLINIC | Age: 58
End: 2023-10-17
Payer: MEDICARE

## 2023-10-17 ENCOUNTER — LAB VISIT (OUTPATIENT)
Dept: LAB | Facility: HOSPITAL | Age: 58
End: 2023-10-17
Attending: INTERNAL MEDICINE
Payer: MEDICARE

## 2023-10-17 DIAGNOSIS — Z95.811 LVAD (LEFT VENTRICULAR ASSIST DEVICE) PRESENT: ICD-10-CM

## 2023-10-17 DIAGNOSIS — Z79.01 LONG TERM (CURRENT) USE OF ANTICOAGULANTS: ICD-10-CM

## 2023-10-17 LAB
INR PPP: 1.7 (ref 0.8–1.2)
PROTHROMBIN TIME: 17.2 SEC (ref 9–12.5)

## 2023-10-17 PROCEDURE — 36415 COLL VENOUS BLD VENIPUNCTURE: CPT | Performed by: INTERNAL MEDICINE

## 2023-10-17 PROCEDURE — 93793 PR ANTICOAGULANT MGMT FOR PT TAKING WARFARIN: ICD-10-PCS | Mod: S$GLB,,,

## 2023-10-17 PROCEDURE — 93793 ANTICOAG MGMT PT WARFARIN: CPT | Mod: S$GLB,,,

## 2023-10-17 PROCEDURE — 85610 PROTHROMBIN TIME: CPT | Performed by: INTERNAL MEDICINE

## 2023-10-18 ENCOUNTER — PATIENT MESSAGE (OUTPATIENT)
Dept: PSYCHIATRY | Facility: CLINIC | Age: 58
End: 2023-10-18

## 2023-10-18 NOTE — PROGRESS NOTES
[facility]    10/18/2023 11:52 AM    Tim Richards (57 y.o.) is followed by the [facility].      Anticoagulation Summary  As of 10/17/2023      INR goal:  2.0-3.0   TTR:  69.9 % (5 y)   INR used for dosin.7 (10/17/2023)   Warfarin maintenance plan:  7.5 mg (5 mg x 1.5) every Thu; 5 mg (5 mg x 1) all other days   Weekly warfarin total:  37.5 mg   Plan last modified:  Radha Eugene, PharmD (10/18/2023)   Next INR check:     Target end date:      Indications    LVAD (left ventricular assist device) present (Resolved) [Z95.811]  Anticoagulation monitoring  INR range 2-3 (Resolved) [Z79.01]                 Anticoagulation Episode Summary       INR check location:  Clinic Lab    Preferred lab:      Send INR reminders to:  [facility] COUMADIN LVAD    Comments:  LVAD// [facility]//Lab [place] Results: [phone] [facility]# [id number] Phone: [phone]//dc 23 [facility] [phone], fax [phone] //pt declined meter, see 10/11/22 & 22 notes// Bridge:NO (h/o bleeds)          Anticoagulation Care Providers       Provider Role Specialty Phone number    Antonio Hadley Jr., MD Responsible Transplant [phone]              Assessment/Plan:    Patient presents today with subtherapeutic  INR. Goal INR 2.0-3.0    Assessment of patient findings per MA/LPN and chart review:   No significant changes noted upon questioning    Recommendation for patient's warfarin regimen:   Due to subtherapeutic INR, patient was instructed to take a total warfarin dose of 7.5 mg today (usual dose 5 mg)., Weekly warfarin dose increased.    Repeat INR in 5 days      Radha Eugene, PharmD, BCPS  Clinical Pharmacist - Coumadin Clinic  Preferred Contact: Secure Messaging or In Basket Message

## 2023-10-18 NOTE — PROGRESS NOTES
Pt states he has not missed or taken any extra doses.I asked pt about 7.5mg dosage on 10/10 and he thinks he took the upped dosage but is not too sure if he took 7.5mg or only the 5mg. Pt states he has not had any changes to be noted that would have effected his INR results.

## 2023-10-19 ENCOUNTER — OFFICE VISIT (OUTPATIENT)
Dept: PSYCHIATRY | Facility: CLINIC | Age: 58
End: 2023-10-19
Payer: MEDICARE

## 2023-10-19 VITALS — BODY MASS INDEX: 27.17 KG/M2 | WEIGHT: 205 LBS | HEIGHT: 73 IN

## 2023-10-19 DIAGNOSIS — F43.23 ADJUSTMENT DISORDER WITH MIXED ANXIETY AND DEPRESSED MOOD: Primary | ICD-10-CM

## 2023-10-19 DIAGNOSIS — G47.00 INSOMNIA, UNSPECIFIED TYPE: ICD-10-CM

## 2023-10-19 DIAGNOSIS — F41.1 GENERALIZED ANXIETY DISORDER: ICD-10-CM

## 2023-10-19 PROCEDURE — 1159F PR MEDICATION LIST DOCUMENTED IN MEDICAL RECORD: ICD-10-PCS | Mod: CPTII,S$GLB,,

## 2023-10-19 PROCEDURE — 1160F RVW MEDS BY RX/DR IN RCRD: CPT | Mod: CPTII,S$GLB,,

## 2023-10-19 PROCEDURE — 3008F PR BODY MASS INDEX (BMI) DOCUMENTED: ICD-10-PCS | Mod: CPTII,S$GLB,,

## 2023-10-19 PROCEDURE — 99215 OFFICE O/P EST HI 40 MIN: CPT | Mod: S$GLB,,,

## 2023-10-19 PROCEDURE — 3008F BODY MASS INDEX DOCD: CPT | Mod: CPTII,S$GLB,,

## 2023-10-19 PROCEDURE — 99215 PR OFFICE/OUTPT VISIT, EST, LEVL V, 40-54 MIN: ICD-10-PCS | Mod: S$GLB,,,

## 2023-10-19 PROCEDURE — 1160F PR REVIEW ALL MEDS BY PRESCRIBER/CLIN PHARMACIST DOCUMENTED: ICD-10-PCS | Mod: CPTII,S$GLB,,

## 2023-10-19 PROCEDURE — 1159F MED LIST DOCD IN RCRD: CPT | Mod: CPTII,S$GLB,,

## 2023-10-19 PROCEDURE — 99999 PR PBB SHADOW E&M-EST. PATIENT-LVL III: ICD-10-PCS | Mod: PBBFAC,,,

## 2023-10-19 PROCEDURE — 99999 PR PBB SHADOW E&M-EST. PATIENT-LVL III: CPT | Mod: PBBFAC,,,

## 2023-10-19 RX ORDER — ALPRAZOLAM 1 MG/1
1 TABLET ORAL 2 TIMES DAILY PRN
Qty: 60 TABLET | Refills: 5 | Status: SHIPPED | OUTPATIENT
Start: 2023-10-19 | End: 2024-01-16 | Stop reason: SDUPTHER

## 2023-10-19 RX ORDER — MIRTAZAPINE 30 MG/1
30 TABLET, FILM COATED ORAL NIGHTLY
Qty: 90 TABLET | Refills: 1 | Status: SHIPPED | OUTPATIENT
Start: 2023-10-19 | End: 2024-01-16 | Stop reason: SDUPTHER

## 2023-10-19 NOTE — PROGRESS NOTES
"Outpatient Psychiatry Follow-Up Visit   10/19/2023    Clinical Status of Patient:  Outpatient (Ambulatory)    Chief Complaint:  Tim Richards is a 57 y.o. male who presents today for follow-up of anxiety.  Met with patient.      Interval History and Content of Current Session 10/19/2023:  Pt is A+Ox 4.  Patients mood is "good", affect appears congruent and appropriate. Pts thought process is normal and logical.  Pts speech is normal tone, normal rate, normal pitch, normal volume   Linear and logical, friendly and cooperative, normal eye contact, no psychomotor retardation.  Pt is calmly seated in chair during interview. Pt is casually dressed and well groomed.       Pt states that he has been doing pretty good since his last visit. Pt is taking Remeron 30 mg QHS and Xanax 0.5-1 mg BID prn (takes 1 pill twice daily). Pt's PCP (Dr. Daniel) recently prescribed him Ambien 12.5 mg nightly prn for insomnia. Denies any side effects from the medications. He reports that he has increased anxiety with driving especially with traffic. He states that he did PT for 3 months for atrophy which has helped a lot with his falls. Pt has had 3-4 falls in 6 months. He reports that his cardiologist is advising that he get on the heart transplant list which he is still debating. He states that he's had to have 2 heart pumps replaced in the past and if he has more issues with this he will get on the transplant list. Pt's support system includes his wife. Pt would like to continue on his current medication regimen.     Pt is having stress with his daughters.     Pt reports  taking medications as prescribed and behaving appropriately during interview today.  Denies SI/HI/AVH. Denies side effects of medications.  Pt reports sleeping normal and normal appetite.   Denies recreational drug use. Pt reports 0 drinks per week, denies tobacco use, denies Vaping, denies Caffeine.      Prior visit :  Psych Interview 03/17/2023:   Tim Richards " "is a 57 y.o. male with past psychiatric history of ALONZO  presented to for initial evaluation and treatment for anxiety .     Pt is A+Ox 4.  Patients mood is "ok", affect appears congruent and appropriate. Pts thought process is normal and logical.  Pts speech is normal tone, normal rate, normal pitch, normal volume   Linear and logical, friendly and cooperative, good eye contact, no psychomotor retardation.  Pt is calmly seated in chair during interview.  Pt is casually dressed and well groomed.  Pt has LVAD bag next to him during clinic visit.       Patient was previously being seen by Dr Krueger for ALONZO, currently taking Remeron 30 and Xanax 0.5mg - 1mg BID daily for panic. Patient has an LVAD and is currently doing OK. Patient states that in 2022 he was hospitalized for three months due to heart failure complications. States that after hospitalization he went through outpatient rehab, states that he had to learn to walk again. Patient states that his support system consists of wife and LVAD team.  States that he has fell two times within the last month, states that both times he fell was due to tripping over his LVAD bag. States that he was not using Xanax at the time of the fall.  Patient states that takes Xanax 1mg two times per week for panic. Patient states that when he takes Xanax he stays seated for 1-2 hours to prevent.       Pt states that his biggest stressor includes possibly losing his disability. Patient states that disability needs to be renewed every six months, states that the disability office has been more adamant about not continuing his disability. Patient states that this has increase in his anxiety.  Pt states that if he loses his disability he does not know how he will make ends meet. Patient states that due to his medical conditions he is unable to work at this time.     Denies prior hx of psychiatric hospitalizations. Denies hx of suicide attempts. Pt denies hx self harm. Pt denies hx " hallucinations.  Pt denies hx of eating disorders.   Pt endorses hx trauma - . Denies physical/sexual abuse. Pt denies symptoms including nightmares, hypervigilance, flashbacks, avoidance behaviors, and disassociation.     Reports depression today as 3/10, and anxiety as 2/10.     Reports sleeping 9 hrs per night, and poor appetite.   Denies SI/HI/AVH. Denies side effects of medications.  Pt states that there support consists of wife  Denies recreational drug use. Pt reports 0 drinks per week, denies tobacco use, denies Vaping, denies Caffeine.       Current Medication:  Xanax 0.5-1 mg Daily      Past meds   Paroxetine   Zoloft      DX:  Pt denies hx symptoms/episodes of miguel.     Admits to symptoms of anxiety including excessive anxiety/worry/fear, more days than not, about numerous issues, difficulty controlling the worry, over thinking, rumination, restlessness, poor concentration, fatigue, and increased irritability. Denies panic attacks at this time.      Past Psychiatric History:   Previous Psychiatric Hospitalizations: NO  Previous Medication Trials: YES:      History of psychotherapy:  NO  Previous Suicide Attempts: NO  History of Violence:  NO  History of physical/sexual abuse: NO  Outpatient psychiatric provider(past): NO     Substance Abuse History:   Tobacco: NO  Alcohol: NO  Illicit Substances: NO  Detox/Rehab: NO     Neurological History:   Seizures: NO  Head trauma: YES: falls        Family Psychiatric History: No  Social History:  Developmental/Childhood:Achieved all developmental milestones timely  *Education:High School Diploma  Employment Status/Finances:Disabled   Relationship Status/Sexual Orientation: : Relationship intact  Children: 3  Housing Status: Home    history:  NO  Access to gun: YES:      Confucianist: Christianity  Recreational activities: watch TV, work on truck  Person patient is closest to/confides in: Wife     Legal History:   Past Charges/Incarcerations:  No      Review  of Systems     Review of Systems   Constitutional:  Negative for weight loss.   HENT:  Negative for tinnitus.    Eyes:  Negative for blurred vision.   Respiratory:  Negative for cough and shortness of breath.    Cardiovascular:  Negative for chest pain.   Gastrointestinal:  Negative for abdominal pain.   Genitourinary:  Negative for dysuria.   Musculoskeletal:  Negative for back pain and neck pain.   Skin:  Negative for rash.   Neurological:  Negative for dizziness, seizures and weakness.   Psychiatric/Behavioral:  Positive for depression. Negative for hallucinations, memory loss, substance abuse and suicidal ideas. The patient is nervous/anxious. The patient does not have insomnia.        Psychiatric Review Of Systems - Is patient experiencing or having changes in:  sleep: no  appetite: no  weight: no  energy/anergy: no  interest/pleasure/anhedonia: no  somatic symptoms: no  libido: no  anxiety/panic: no  guilty/hopelessness: no  concentration: no  S.I.B.s/risky behavior: no  Irritability: no  Racing thoughts: no  Impulsive behaviors: no  Paranoia: no  AVH: no      Past Medical, Family and Social History: The patient's past medical, family and social history have been reviewed and updated as appropriate within the electronic medical record - see encounter notes.      Current Medications:   Medication List with Changes/Refills   Current Medications    AMIODARONE (PACERONE) 200 MG TAB    Take 2 tablets (400 mg total) by mouth once daily.    AMLODIPINE (NORVASC) 10 MG TABLET    Take 1 tablet (10 mg total) by mouth once daily.    CYANOCOBALAMIN 1,000 MCG/ML INJECTION    Inject 1 mL (1,000 mcg total) into the skin once a week. for 12 doses    FERROUS GLUCONATE (FERGON) 324 MG TABLET    Take 1 tablet (324 mg total) by mouth 2 (two) times daily with meals.    FUROSEMIDE (LASIX) 40 MG TABLET    Take 1 tablet (40 mg total) by mouth every Tues, Thurs.    LEVOTHYROXINE (SYNTHROID) 125 MCG TABLET    Take 1 tablet (125 mcg  "total) by mouth before breakfast.    MAGNESIUM OXIDE (MAG-OX) 400 MG (241.3 MG MAGNESIUM) TABLET    Take 1 tablet (400 mg total) by mouth 2 (two) times daily.    MEXILETINE (MEXITIL) 250 MG CAP    Take 1 capsule (250 mg total) by mouth every 8 (eight) hours.    OMEGA-3 ACID ETHYL ESTERS (LOVAZA) 1 GRAM CAPSULE    Take 2 capsules (2 g total) by mouth 2 (two) times daily.    PANTOPRAZOLE (PROTONIX) 40 MG TABLET    Take 1 tablet (40 mg total) by mouth daily with lunch.    POTASSIUM CHLORIDE SA (K-DUR,KLOR-CON) 20 MEQ TABLET    Take 1 tablet with every lasix 40 mg dose    WARFARIN (COUMADIN) 5 MG TABLET    Take a half tablet (2.5mg) by mouth on 10/5 only. Then take 1 tablet (5mg) daily    ZOLPIDEM (AMBIEN CR) 12.5 MG CR TABLET    Take 1 tablet (12.5 mg total) by mouth nightly as needed for Insomnia.    ZOLPIDEM (AMBIEN) 10 MG TAB    Take 1 tablet (10 mg total) by mouth nightly as needed (insomnia).   Changed and/or Refilled Medications    Modified Medication Previous Medication    ALPRAZOLAM (XANAX) 1 MG TABLET ALPRAZolam (XANAX) 1 MG tablet       Take 1 tablet (1 mg total) by mouth 2 (two) times daily as needed for Anxiety.    Take 0.5-1 tablets (0.5-1 mg total) by mouth 2 (two) times daily as needed for Anxiety.    MIRTAZAPINE (REMERON) 30 MG TABLET mirtazapine (REMERON) 30 MG tablet       Take 1 tablet (30 mg total) by mouth every evening.    TAKE 1 TABLET BY MOUTH EVERY EVENING.         Allergies:   Review of patient's allergies indicates:   Allergen Reactions    Lisinopril Anaphylaxis    Hydralazine     Hydralazine analogues      Chronic constipation, impotence, dizziness         Vitals   Vitals:          Labs/Imaging/Studies:   Recent Results (from the past 48 hour(s))   Protime-INR    Collection Time: 10/17/23 12:40 PM   Result Value Ref Range    Prothrombin Time 17.2 (H) 9.0 - 12.5 sec    INR 1.7 (H) 0.8 - 1.2      No results found for: "PHENYTOIN", "PHENOBARB", "VALPROATE", "CBMZ"    Compliance: yes    Side " "effects: None    Risk Parameters:  Patient reports no suicidal ideation  Patient reports no homicidal ideation  Patient reports no self-injurious behavior  Patient reports no violent behavior    Exam (detailed: at least 9 elements; comprehensive: all 15 elements)   Constitutional  Vitals:  Most recent vital signs, dated less than 90 days prior to this appointment, were reviewed.   Vitals:    10/19/23 0844   Weight: 93 kg (205 lb 0.4 oz)   Height: 6' 1" (1.854 m)        General:  unremarkable, age appropriate     Musculoskeletal  Muscle Strength/Tone:  no spasicity, no rigidity, no cogwheeling, no flaccidity, no paratonia, no dyskinesia, no dystonia, no tremor, no tic, no choreoathetosis, no atrophy   Gait & Station:  non-ataxic     Psychiatric  Speech:  no latency; no press   Mood & Affect:  steady  congruent and appropriate   Thought Process:  normal and logical   Associations:  intact   Thought Content:  normal, no suicidality, no homicidality, delusions, or paranoia   Insight:  intact, has awareness of illness   Judgement: behavior is adequate to circumstances, age appropriate   Orientation:  grossly intact, person, place, situation, time/date   Memory: intact for content of interview, grossly intact, memory >3 objects at five mins, able to remember recent events- Yes, able to remember remote events- Yes   Language: grossly intact, able to name, able to repeat   Attention Span & Concentration:  able to focus, completed tasks   Fund of Knowledge:  intact and appropriate to age and level of education, familiar with aspects of current personal life     Assessment and Diagnosis   Status/Progress: Based on the examination today, the patient's problem(s) is/are well controlled.  New problems have not been presented today.   Co-morbidities are complicating management of the primary condition.     General Impression:      ICD-10-CM ICD-9-CM   1. Adjustment disorder with mixed anxiety and depressed mood  F43.23 309.28 "   2. Insomnia, unspecified type  G47.00 780.52   3. Generalized anxiety disorder  F41.1 300.02       Intervention/Counseling/Treatment Plan      Mood   Continue Remeron 30mg QHS - targeting insomnia  Continue Xanax 0.5-1mg BID PRN anxiety/panic   -Okay to continue short course of Xanax.  Long-term goal is to not continue this medication.  Concern about starting patient on antidepressants due to possible effect on clotting factors. Pt has been on remeron for years and has done well, will continue at this time.    -Patient uses Xanax two times per week, at this time the benefits Outweigh the risk.  patient was extensively educated in the risks of taking Xanax with a history of falls. Patient states that he will not Ambulate two hours after taking Xanax.  - Pt is taking Ambien 12.5mg PRN insomnia.  This is not being prescribed by myself.  Pt extensively educated to not combine Ambien and Xanax.  Pt is amendable      I believe patient would benefit from talk therapy at this time. Patient denies the need for talk therapy, will bring up again during next appointment.    Discussed diagnosis, risks and benefits of proposed treatment above vs alternative treatments vs no treatment, and potential side effects of these treatments, and the inherent unpredictability of individual response to treatment.  The patient expresses understanding and gives informed consent to pursue treatment.  The potential benefits outweigh the potential risks. Patient has no other questions. Risks/adverse effects discussed at this time including but not limited to: GI side effects, sexual dysfunction, activation vs sedation, triggering of suicidal thoughts, and serotonin syndrome.   Discussed with patient, the potential adverse effects of Benzodiazepines, including, but not limited to, drowsiness, dizziness, risk of falls, and abuse potential. Counseled patient on avoiding alcohol, while using this medication, due to the risk of respiratory  depression. Patient instructed not to operate any heavy machinery or drive a vehicle while on this medication. Informed patient of the risks of continuous benzodiazepine use, including tolerance, dependence and withdrawals that may be life threatening, upon abrupt cessation. Also advised patient not to take benzodiazepines with opiates and/or other sedatives. The patient expresses an understanding of the above as well as the inherent unpredictability of said treatment.  The patient agrees that, currently, the benefits outweigh the risks, and would like to pursue said treatment at this time.      Serotonin syndrome   Mental status changes can include anxiety, restlessness, disorientation, and agitated delirium.    Autonomic manifestations can include diaphoresis, tachycardia, hyperthermia, hypertension, vomiting, and diarrhea   Neuromuscular hyperactivity can manifest as tremor, myoclonus, hyperreflexia, rigidity, hyperthermia, seizure, and bilateral Babinski sign.   Pt was informed that if they experience any of these symptoms to go the ED.     Difficulty Sleeping Behavioral Modification:  Implement stimulus control: [place] bedroom for sleep only. Leave bedroom when frustrated from not sleeping. Engage in relaxation before returning. Engage in activities during the day. AVOID >7-8 h time in bed  Avoid clock watching  Avoid thinking/worrying about sleep when trying to fall asleep  Limit caffeinated consumption  Make sure the bedroom is dark, quiet and cool    Safety Plan   Patient voices understanding and agreement with this plan  Provided crisis numbers  Encouraged patient to keep future appointments.  Instructed patient to call or message with questions or concerns  In the event of an emergency, including suicidal ideation, patient was advised to go to the emergency room and/or call 911    Return to Clinic: 6 months    Psychotherapy:  Target symptoms: anxiety   Why chosen therapy is appropriate versus another  modality: relevant to diagnosis, evidence based practice  Outcome monitoring methods: self-report, observation  Therapeutic intervention type: insight oriented psychotherapy, interactive psychotherapy  Topics discussed/themes: relationships difficulties, stress related to medical comorbidities, difficulty managing affect in interpersonal relationships, building skills sets for symptom management  The patient's response to the intervention is guarded. The patient's progress toward treatment goals is fair.   Duration of intervention: 15 minutes.    Total face to face time: 40 min  Total time (chart review, patient contact, documentation): 45 min    A diagnostic psychiatric evaluation was performed and responsiveness to treatment was assessed.  The patient demonstrates adequate ability/capacity to respond to treatment.    Osmar Mejia PA-C    *This note has been prepared using a combination of a dictation device and typing.  It has been checked for errors but some errors may still exist within the note as a result of speech recognition errors and/or typographical errors.

## 2023-10-23 ENCOUNTER — LAB VISIT (OUTPATIENT)
Dept: LAB | Facility: HOSPITAL | Age: 58
End: 2023-10-23
Attending: INTERNAL MEDICINE
Payer: MEDICARE

## 2023-10-23 ENCOUNTER — ANTI-COAG VISIT (OUTPATIENT)
Dept: CARDIOLOGY | Facility: CLINIC | Age: 58
End: 2023-10-23
Payer: MEDICARE

## 2023-10-23 DIAGNOSIS — Z79.01 LONG TERM (CURRENT) USE OF ANTICOAGULANTS: ICD-10-CM

## 2023-10-23 DIAGNOSIS — Z95.811 LVAD (LEFT VENTRICULAR ASSIST DEVICE) PRESENT: ICD-10-CM

## 2023-10-23 LAB
INR PPP: 3 (ref 0.8–1.2)
PROTHROMBIN TIME: 29.1 SEC (ref 9–12.5)

## 2023-10-23 PROCEDURE — 93793 PR ANTICOAGULANT MGMT FOR PT TAKING WARFARIN: ICD-10-PCS | Mod: S$GLB,,,

## 2023-10-23 PROCEDURE — 85610 PROTHROMBIN TIME: CPT | Performed by: INTERNAL MEDICINE

## 2023-10-23 PROCEDURE — 36415 COLL VENOUS BLD VENIPUNCTURE: CPT | Performed by: INTERNAL MEDICINE

## 2023-10-23 PROCEDURE — 93793 ANTICOAG MGMT PT WARFARIN: CPT | Mod: S$GLB,,,

## 2023-10-24 ENCOUNTER — OFFICE VISIT (OUTPATIENT)
Dept: FAMILY MEDICINE | Facility: CLINIC | Age: 58
End: 2023-10-24
Payer: MEDICARE

## 2023-10-24 VITALS
OXYGEN SATURATION: 96 % | HEIGHT: 73 IN | WEIGHT: 241.38 LBS | TEMPERATURE: 98 F | HEART RATE: 30 BPM | SYSTOLIC BLOOD PRESSURE: 88 MMHG | BODY MASS INDEX: 31.99 KG/M2

## 2023-10-24 DIAGNOSIS — Z95.811 LVAD (LEFT VENTRICULAR ASSIST DEVICE) PRESENT: ICD-10-CM

## 2023-10-24 DIAGNOSIS — R22.41 MASS OF RIGHT THIGH: Primary | ICD-10-CM

## 2023-10-24 PROCEDURE — 1160F RVW MEDS BY RX/DR IN RCRD: CPT | Mod: CPTII,S$GLB,, | Performed by: NURSE PRACTITIONER

## 2023-10-24 PROCEDURE — 99213 PR OFFICE/OUTPT VISIT, EST, LEVL III, 20-29 MIN: ICD-10-PCS | Mod: S$GLB,,, | Performed by: NURSE PRACTITIONER

## 2023-10-24 PROCEDURE — 3008F BODY MASS INDEX DOCD: CPT | Mod: CPTII,S$GLB,, | Performed by: NURSE PRACTITIONER

## 2023-10-24 PROCEDURE — 1159F PR MEDICATION LIST DOCUMENTED IN MEDICAL RECORD: ICD-10-PCS | Mod: CPTII,S$GLB,, | Performed by: NURSE PRACTITIONER

## 2023-10-24 PROCEDURE — 99213 OFFICE O/P EST LOW 20 MIN: CPT | Mod: S$GLB,,, | Performed by: NURSE PRACTITIONER

## 2023-10-24 PROCEDURE — 3008F PR BODY MASS INDEX (BMI) DOCUMENTED: ICD-10-PCS | Mod: CPTII,S$GLB,, | Performed by: NURSE PRACTITIONER

## 2023-10-24 PROCEDURE — 1160F PR REVIEW ALL MEDS BY PRESCRIBER/CLIN PHARMACIST DOCUMENTED: ICD-10-PCS | Mod: CPTII,S$GLB,, | Performed by: NURSE PRACTITIONER

## 2023-10-24 PROCEDURE — 99999 PR PBB SHADOW E&M-EST. PATIENT-LVL V: CPT | Mod: PBBFAC,,, | Performed by: NURSE PRACTITIONER

## 2023-10-24 PROCEDURE — 1159F MED LIST DOCD IN RCRD: CPT | Mod: CPTII,S$GLB,, | Performed by: NURSE PRACTITIONER

## 2023-10-24 PROCEDURE — 99999 PR PBB SHADOW E&M-EST. PATIENT-LVL V: ICD-10-PCS | Mod: PBBFAC,,, | Performed by: NURSE PRACTITIONER

## 2023-10-24 RX ORDER — FUROSEMIDE 40 MG/1
40 TABLET ORAL
Qty: 30 TABLET | Refills: 5 | Status: SHIPPED | OUTPATIENT
Start: 2023-10-24 | End: 2024-01-02 | Stop reason: ALTCHOICE

## 2023-10-24 RX ORDER — MEXILETINE HYDROCHLORIDE 250 MG/1
250 CAPSULE ORAL EVERY 8 HOURS
Qty: 90 CAPSULE | Refills: 11 | Status: SHIPPED | OUTPATIENT
Start: 2023-10-24 | End: 2024-10-23

## 2023-10-24 RX ORDER — PANTOPRAZOLE SODIUM 40 MG/1
40 TABLET, DELAYED RELEASE ORAL
Qty: 90 TABLET | Refills: 3 | Status: SHIPPED | OUTPATIENT
Start: 2023-10-24

## 2023-10-24 RX ORDER — OMEGA-3-ACID ETHYL ESTERS 1 G/1
2 CAPSULE, LIQUID FILLED ORAL 2 TIMES DAILY
Qty: 360 CAPSULE | Refills: 3 | Status: SHIPPED | OUTPATIENT
Start: 2023-10-24

## 2023-10-24 RX ORDER — FERROUS GLUCONATE 324(38)MG
324 TABLET ORAL 2 TIMES DAILY WITH MEALS
Qty: 60 TABLET | Refills: 11 | Status: SHIPPED | OUTPATIENT
Start: 2023-10-24

## 2023-10-24 RX ORDER — LANOLIN ALCOHOL/MO/W.PET/CERES
400 CREAM (GRAM) TOPICAL 2 TIMES DAILY
Qty: 90 TABLET | Refills: 11 | Status: SHIPPED | OUTPATIENT
Start: 2023-10-24

## 2023-10-24 RX ORDER — WARFARIN SODIUM 5 MG/1
TABLET ORAL
Qty: 135 TABLET | Refills: 3
Start: 2023-10-24 | End: 2023-11-20 | Stop reason: SDUPTHER

## 2023-10-24 NOTE — PROGRESS NOTES
Subjective:       Patient ID: Tim Richards is a 57 y.o. male.    Chief Complaint: Mass    HPI     Tim Richards is a 57 y.o. male patient that presents to clinic with a chief complaint of right leg mass. Past medical and surgical history reviewed as listed. PCP is Diego Daniel MD, he is  new  to me. Patient reports right thigh mass that he noticed approximately 3 weeks ago. Non tender, no drainage or redness. Concerned that he has blood clot in lower extremity.     LVAD working normally.     ROS as listed.  Past Medical History:   Diagnosis Date    A-fib     Anticoagulant long-term use     Atrial flutter 7/30/2022    CHF (congestive heart failure)     Class 1 obesity due to excess calories with serious comorbidity and body mass index (BMI) of 31.0 to 31.9 in adult     Class 1 obesity due to excess calories with serious comorbidity and body mass index (BMI) of 32.0 to 32.9 in adult     Dilated cardiomyopathy 1/10/2018    Disorder of kidney and ureter     CKD    Encounter for blood transfusion     Essential hypertension 8/28/2022    Gout     HTN (hypertension)     Hx of psychiatric care     ICD (implantable cardioverter-defibrillator) infection 7/1/2020    Psychiatric problem     Thyroid disease     Ventricular tachycardia (paroxysmal)       Past Surgical History:   Procedure Laterality Date    AORTIC VALVULOPLASTY N/A 07/13/2022    Procedure: REPAIR, AORTIC VALVE;  Surgeon: Yg Kaufman MD;  Location: [facility] OR [address] [place] [address];  Service: Cardiovascular;  Laterality: N/A;    APPLICATION OF WOUND VACUUM-ASSISTED CLOSURE DEVICE N/A 07/15/2022    Procedure: APPLICATION, WOUND VAC;  Surgeon: Yg Kaufman MD;  Location: [facility] OR [address] [place] [address];  Service: Cardiovascular;  Laterality: N/A;  50 x 5 cm     APPLICATION OF WOUND VACUUM-ASSISTED CLOSURE DEVICE Right 09/27/2022    Procedure: APPLICATION, WOUND VAC;  Surgeon: Kole Tabares MD;  Location: [facility] OR [address] [place] [address];  Service: Plastics;  Laterality: Right;    CARDIAC  CATHETERIZATION  12/2012    CARDIAC DEFIBRILLATOR PLACEMENT Left     CRRT-D    CARDIAC VALVE REPLACEMENT  03/08/2018    LVAD Implant    COLONOSCOPY N/A 03/06/2018    Procedure: COLONOSCOPY;  Surgeon: Alonso Bone MD;  Location: [facility] ENDO (2ND FLR);  Service: Endoscopy;  Laterality: N/A;    COLONOSCOPY N/A 07/17/2019    Procedure: COLONOSCOPY;  Surgeon: Blane Valdez MD;  Location: [facility] ENDO (2ND FLR);  Service: Endoscopy;  Laterality: N/A;    COLONOSCOPY N/A 07/18/2019    Procedure: COLONOSCOPY;  Surgeon: Blane Valdez MD;  Location: [facility] ENDO (2ND FLR);  Service: Endoscopy;  Laterality: N/A;    CREATION OF ILIOFEMORAL ARTERY BYPASS Right 09/27/2022    Procedure: CREATION, BYPASS, ARTERIAL, ILIAC TO FEMORAL WITH GRAFT;  Surgeon: Zach Hernández MD;  Location: [facility] OR 2ND FLR;  Service: Peripheral Vascular;  Laterality: Right;    CREATION OF MUSCLE ROTATIONAL FLAP Right 09/27/2022    Procedure: CREATION, FLAP, MUSCLE ROTATION, SARTORIUS AND RECTUS FEMORIS;  Surgeon: Kole Tabares MD;  Location: [facility] OR 2ND FLR;  Service: Plastics;  Laterality: Right;    ESOPHAGOGASTRODUODENOSCOPY N/A 07/17/2019    Procedure: EGD (ESOPHAGOGASTRODUODENOSCOPY);  Surgeon: Blane Valdez MD;  Location: [facility] ENDO (2ND FLR);  Service: Endoscopy;  Laterality: N/A;    ESOPHAGOGASTRODUODENOSCOPY N/A 07/18/2019    Procedure: EGD (ESOPHAGOGASTRODUODENOSCOPY);  Surgeon: Blane Valdez MD;  Location: [facility] ENDO (2ND FLR);  Service: Endoscopy;  Laterality: N/A;    FOREIGN BODY REMOVAL N/A 07/22/2022    Procedure: REMOVAL, FOREIGN BODY;  Surgeon: Yg Kaufman MD;  Location: [facility] OR 2ND FLR;  Service: Cardiovascular;  Laterality: N/A;  LVAD Heartmate 2 drive line removal    INSERTION OF GRAFT TO PERICARDIUM N/A 07/15/2022    Procedure: INSERTION, GRAFT, PERICARDIUM;  Surgeon: Yg Kaufman MD;  Location: [facility] OR 2ND FLR;  Service: Cardiovascular;  Laterality: N/A;    IRRIGATION OF MEDIASTINUM N/A 07/15/2022    Procedure: IRRIGATION, MEDIASTINUM;   Surgeon: Yg Kaufman MD;  [address]  Service: Cardiovascular;  Laterality: N/A;    LYSIS OF ADHESIONS  07/13/2022    Procedure: LYSIS, ADHESIONS;  Surgeon: Yg Kaufman MD;  Location: [address];  Service: Cardiovascular;;    NONINVASIVE CARDIAC ELECTROPHYSIOLOGY STUDY N/A 10/18/2019    Procedure: CARDIAC ELECTROPHYSIOLOGY STUDY, NONINVASIVE;  Surgeon: Raz Wagner MD;  Location: [facility] EP LAB;  Service: Cardiology;  Laterality: N/A;  VT, DFTs, MDT CRTD in situ, LVAD, juarez MB, 3098    REPLACEMENT OF IMPLANTABLE CARDIOVERTER-DEFIBRILLATOR (ICD) GENERATOR N/A 03/09/2020    Procedure: REPLACEMENT, ICD GENERATOR;  Surgeon: Harry Yun MD;  Location: [facility] EP LAB;  Service: Cardiology;  Laterality: N/A;  VT, ICD Gen Change and Lead Revision, MDT, MAC, DM,3 Prep    REPLACEMENT OF LEFT VENTRICULAR ASSIST DEVICE (LVAD)  07/13/2022    Procedure: REPLACEMENT, LVAD;  Surgeon: Yg Kaufman MD;  Location: [address];  Service: Cardiovascular;;    REPLACEMENT OF PUMP N/A 07/13/2022    Procedure: REPLACEMENT, PUMP;  Surgeon: Yg Kaufman MD;  Location: [address];  Service: Cardiovascular;  Laterality: N/A;  LVAD pump exchange  EXPLANATION OF HEATMATE 2  IMPLANTATION OF HEARTMATE 3  IMPLANTATION OF 8MM CHIMNEY GRAFT TO RFA  INITIATION OF ECMO  TEMPORARY CLOSURE OF CHEST    REVISION OF IMPLANTABLE CARDIOVERTER-DEFIBRILLATOR (ICD) ELECTRODE LEAD PLACEMENT N/A 03/09/2020    Procedure: REVISION, INSERTION, ELECTRODE LEAD, ICD;  Surgeon: Harry Yun MD;  Location: [facility] EP LAB;  Service: Cardiology;  Laterality: N/A;  VT, ICD Gen Change and Lead Revision, MDT, MAC, DM,3 Prep    RIGHT HEART CATHETERIZATION Right 09/08/2023    Procedure: INSERTION, CATHETER, RIGHT HEART;  Surgeon: Gaurav Rodriguez MD;  Location: [facility] CATH LAB;  Service: Cardiology;  Laterality: Right;    STERNAL WOUND CLOSURE N/A 07/14/2022    Procedure: CLOSURE, WOUND, STERNUM;  Surgeon: Yg Kaufman MD;   "[address]  Service: Cardiovascular;  Laterality: N/A;  temporary closure  evacuation of hematoma    STERNAL WOUND CLOSURE N/A 07/15/2022    Procedure: CLOSURE, WOUND, STERNUM;  Surgeon: Yg Kaufman MD;  [facility];  Service: Cardiovascular;  Laterality: N/A;    TRACHEOSTOMY N/A 08/04/2022    Procedure: CREATION, TRACHEOSTOMY;  Surgeon: Germain Holt MD;  [facility];  Service: General;  Laterality: N/A;    TREATMENT OF CARDIAC ARRHYTHMIA  10/18/2019    Procedure: Cardioversion or Defibrillation;  Surgeon: Raz Wagner MD;  Location: [facility] EP LAB;  Service: Cardiology;;      Family History   Problem Relation Age of Onset    Hypertension Father     Diabetes Father     Coronary artery disease Father     Heart disease Father         CHF    No Known Problems Mother     Cancer Sister 54        breast CA    No Known Problems Brother     Anxiety disorder Neg Hx     Depression Neg Hx     Dementia Neg Hx     Bipolar disorder Neg Hx     Suicide Neg Hx       Review of patient's allergies indicates:   Allergen Reactions    Lisinopril Anaphylaxis    Hydralazine     Hydralazine analogues      Chronic constipation, impotence, dizziness     Review of Systems   Skin:         mass       Objective:      Vitals:    10/24/23 1041   BP: (!) 88/0   BP Location: Right arm   Patient Position: Sitting   BP Method: Medium (Manual)   Pulse: (!) 30   Temp: 97.9 °F (36.6 °C)   TempSrc: Oral   SpO2: 96%   Weight: 109.5 kg (241 lb 6.5 oz)   Height: 6' 1" (1.854 m)      Physical Exam  Vitals and nursing note reviewed.   Constitutional:       General: He is not in acute distress.     Appearance: Normal appearance.   HENT:      Head: Normocephalic and atraumatic.   Eyes:      Conjunctiva/sclera: Conjunctivae normal.      Pupils: Pupils are equal, round, and reactive to light.   Cardiovascular:      Rate and Rhythm: Bradycardia present.      Heart sounds: Murmur heard.      Comments: LVAD "   Pulmonary:      Effort: Pulmonary effort is normal. No respiratory distress.      Breath sounds: Normal breath sounds.   Musculoskeletal:      Cervical back: Normal range of motion.      Comments: Mass to right lateral thigh;non tender    Skin:     General: Skin is warm and dry.   Neurological:      Mental Status: He is alert and oriented to person, place, and time.   Psychiatric:         Mood and Affect: Mood normal.         Behavior: Behavior normal.         Lab Results   Component Value Date    WBC 3.66 (L) 09/08/2023    HGB 14.9 09/08/2023    HCT 49.8 09/08/2023     09/08/2023    CHOL 174 02/23/2023    TRIG 80 02/23/2023    HDL 77 (H) 02/23/2023    ALT 48 (H) 09/11/2023    AST 62 (H) 09/11/2023     09/11/2023    K 4.5 09/11/2023     09/11/2023    CREATININE 2.1 (H) 09/11/2023    BUN 26 (H) 09/11/2023    CO2 25 09/11/2023    TSH 11.576 (H) 09/08/2023    PSA 0.99 01/23/2018    INR 3.0 (H) 10/23/2023    HGBA1C 5.4 04/26/2021      Assessment:       1. Mass of right thigh    2. LVAD (left ventricular assist device) present        Plan:       Mass of right thigh  -     US Soft Tissue, Lower Extremity, Right; Future; Expected date: 10/24/2023    LVAD (left ventricular assist device) present  Stable, The current medical regimen is effective;  continue present plan and medications.    Medication List with Changes/Refills   Current Medications    ALPRAZOLAM (XANAX) 1 MG TABLET    Take 1 tablet (1 mg total) by mouth 2 (two) times daily as needed for Anxiety.    AMIODARONE (PACERONE) 200 MG TAB    Take 2 tablets (400 mg total) by mouth once daily.    AMLODIPINE (NORVASC) 10 MG TABLET    Take 1 tablet (10 mg total) by mouth once daily.    CYANOCOBALAMIN 1,000 MCG/ML INJECTION    Inject 1 mL (1,000 mcg total) into the skin once a week. for 12 doses    LEVOTHYROXINE (SYNTHROID) 125 MCG TABLET    Take 1 tablet (125 mcg total) by mouth before breakfast.    MIRTAZAPINE (REMERON) 30 MG TABLET    Take 1 tablet  (30 mg total) by mouth every evening.    POTASSIUM CHLORIDE SA (K-DUR,KLOR-CON) 20 MEQ TABLET    Take 1 tablet with every lasix 40 mg dose    ZOLPIDEM (AMBIEN CR) 12.5 MG CR TABLET    Take 1 tablet (12.5 mg total) by mouth nightly as needed for Insomnia.    ZOLPIDEM (AMBIEN) 10 MG TAB    Take 1 tablet (10 mg total) by mouth nightly as needed (insomnia).   Changed and/or Refilled Medications    Modified Medication Previous Medication    FERROUS GLUCONATE (FERGON) 324 MG TABLET ferrous gluconate (FERGON) 324 MG tablet       Take 1 tablet (324 mg total) by mouth 2 (two) times daily with meals.    Take 1 tablet (324 mg total) by mouth 2 (two) times daily with meals.    FUROSEMIDE (LASIX) 40 MG TABLET furosemide (LASIX) 40 MG tablet       Take 1 tablet (40 mg total) by mouth every Tues, Thurs.    Take 1 tablet (40 mg total) by mouth every Tues, Thurs.    MAGNESIUM OXIDE (MAG-OX) 400 MG (241.3 MG MAGNESIUM) TABLET magnesium oxide (MAG-OX) 400 mg (241.3 mg magnesium) tablet       Take 1 tablet (400 mg total) by mouth 2 (two) times daily.    Take 1 tablet (400 mg total) by mouth 2 (two) times daily.    MEXILETINE (MEXITIL) 250 MG CAP mexiletine (MEXITIL) 250 MG Cap       Take 1 capsule (250 mg total) by mouth every 8 (eight) hours.    Take 1 capsule (250 mg total) by mouth every 8 (eight) hours.    OMEGA-3 ACID ETHYL ESTERS (LOVAZA) 1 GRAM CAPSULE omega-3 acid ethyl esters (LOVAZA) 1 gram capsule       Take 2 capsules (2 g total) by mouth 2 (two) times daily.    Take 2 capsules (2 g total) by mouth 2 (two) times daily.    PANTOPRAZOLE (PROTONIX) 40 MG TABLET pantoprazole (PROTONIX) 40 MG tablet       Take 1 tablet (40 mg total) by mouth daily with lunch.    Take 1 tablet (40 mg total) by mouth daily with lunch.    WARFARIN (COUMADIN) 5 MG TABLET warfarin (COUMADIN) 5 MG tablet       Take a half tablet (2.5mg) by mouth on 10/5 only. Then take 1 tablet (5mg) daily    Take a half tablet (2.5mg) by mouth on 10/5 only. Then take  1 tablet (5mg) daily         Follow up if symptoms worsen or fail to improve, for Dr. Diego Daniel.        Erica Amos, DNP, APRN, FNP-C  Family Medicine  [facility]

## 2023-10-30 ENCOUNTER — LAB VISIT (OUTPATIENT)
Dept: LAB | Facility: HOSPITAL | Age: 58
End: 2023-10-30
Attending: INTERNAL MEDICINE
Payer: MEDICARE

## 2023-10-30 ENCOUNTER — ANTI-COAG VISIT (OUTPATIENT)
Dept: CARDIOLOGY | Facility: CLINIC | Age: 58
End: 2023-10-30
Payer: MEDICARE

## 2023-10-30 DIAGNOSIS — Z79.01 LONG TERM (CURRENT) USE OF ANTICOAGULANTS: ICD-10-CM

## 2023-10-30 DIAGNOSIS — Z95.811 LVAD (LEFT VENTRICULAR ASSIST DEVICE) PRESENT: ICD-10-CM

## 2023-10-30 LAB
INR PPP: 2.4 (ref 0.8–1.2)
PROTHROMBIN TIME: 24 SEC (ref 9–12.5)

## 2023-10-30 PROCEDURE — 93793 ANTICOAG MGMT PT WARFARIN: CPT | Mod: S$GLB,,,

## 2023-10-30 PROCEDURE — 36415 COLL VENOUS BLD VENIPUNCTURE: CPT | Performed by: INTERNAL MEDICINE

## 2023-10-30 PROCEDURE — 85610 PROTHROMBIN TIME: CPT | Performed by: INTERNAL MEDICINE

## 2023-10-30 PROCEDURE — 93793 PR ANTICOAGULANT MGMT FOR PT TAKING WARFARIN: ICD-10-PCS | Mod: S$GLB,,,

## 2023-11-02 ENCOUNTER — PATIENT MESSAGE (OUTPATIENT)
Dept: TRANSPLANT | Facility: CLINIC | Age: 58
End: 2023-11-02
Payer: MEDICARE

## 2023-11-06 ENCOUNTER — ANTI-COAG VISIT (OUTPATIENT)
Dept: CARDIOLOGY | Facility: CLINIC | Age: 58
End: 2023-11-06
Payer: MEDICARE

## 2023-11-06 ENCOUNTER — LAB VISIT (OUTPATIENT)
Dept: LAB | Facility: HOSPITAL | Age: 58
End: 2023-11-06
Attending: INTERNAL MEDICINE
Payer: MEDICARE

## 2023-11-06 DIAGNOSIS — Z79.01 LONG TERM (CURRENT) USE OF ANTICOAGULANTS: ICD-10-CM

## 2023-11-06 DIAGNOSIS — Z95.811 LVAD (LEFT VENTRICULAR ASSIST DEVICE) PRESENT: ICD-10-CM

## 2023-11-06 LAB
INR PPP: 2.8 (ref 0.8–1.2)
PROTHROMBIN TIME: 27.9 SEC (ref 9–12.5)

## 2023-11-06 PROCEDURE — 93793 ANTICOAG MGMT PT WARFARIN: CPT | Mod: S$GLB,,,

## 2023-11-06 PROCEDURE — 93793 PR ANTICOAGULANT MGMT FOR PT TAKING WARFARIN: ICD-10-PCS | Mod: S$GLB,,,

## 2023-11-06 PROCEDURE — 85610 PROTHROMBIN TIME: CPT | Performed by: INTERNAL MEDICINE

## 2023-11-06 PROCEDURE — 36415 COLL VENOUS BLD VENIPUNCTURE: CPT | Performed by: INTERNAL MEDICINE

## 2023-11-13 ENCOUNTER — PATIENT MESSAGE (OUTPATIENT)
Dept: ADMINISTRATIVE | Facility: HOSPITAL | Age: 58
End: 2023-11-13
Payer: MEDICARE

## 2023-11-14 NOTE — PROGRESS NOTES
11/14/2023-Pt stated he missed 11/13/2023 INR because he sprang his ankle and will go for INR 11/15/23.

## 2023-11-15 ENCOUNTER — PATIENT OUTREACH (OUTPATIENT)
Dept: ADMINISTRATIVE | Facility: HOSPITAL | Age: 58
End: 2023-11-15
Payer: MEDICARE

## 2023-11-15 NOTE — PROGRESS NOTES
Health Maintenance Due   Topic Date Due    Shingles Vaccine (1 of 2) Never done    Colorectal Cancer Screening  07/18/2022    Influenza Vaccine (1) 09/01/2023    COVID-19 Vaccine (4 - 2023-24 season) 09/01/2023     Chart review done. HM updated. Immunizations reviewed & updated. Care Everywhere updated.  Message sent to patient about location of last colon cancer screening that the patient states was competed.

## 2023-11-15 NOTE — PROGRESS NOTES
11/15/2023-Pt sent [employer] message asking to have today's inr rescheduled to 11/16/23 because he sprang his ankle and it's swollen and he can't put pressure on it. INR rescheduled for 11/16/23.

## 2023-11-16 ENCOUNTER — ANTI-COAG VISIT (OUTPATIENT)
Dept: CARDIOLOGY | Facility: CLINIC | Age: 58
End: 2023-11-16
Payer: MEDICARE

## 2023-11-16 ENCOUNTER — LAB VISIT (OUTPATIENT)
Dept: LAB | Facility: HOSPITAL | Age: 58
End: 2023-11-16
Attending: INTERNAL MEDICINE
Payer: MEDICARE

## 2023-11-16 DIAGNOSIS — Z79.01 LONG TERM (CURRENT) USE OF ANTICOAGULANTS: ICD-10-CM

## 2023-11-16 DIAGNOSIS — Z95.811 LVAD (LEFT VENTRICULAR ASSIST DEVICE) PRESENT: Chronic | ICD-10-CM

## 2023-11-16 DIAGNOSIS — Z79.01 ANTICOAGULATION MONITORING, INR RANGE 2-3: ICD-10-CM

## 2023-11-16 DIAGNOSIS — Z95.811 LVAD (LEFT VENTRICULAR ASSIST DEVICE) PRESENT: ICD-10-CM

## 2023-11-16 DIAGNOSIS — I50.42 CHRONIC COMBINED SYSTOLIC AND DIASTOLIC HEART FAILURE: Chronic | ICD-10-CM

## 2023-11-16 LAB
INR PPP: 2.6 (ref 0.8–1.2)
PROTHROMBIN TIME: 25.7 SEC (ref 9–12.5)

## 2023-11-16 PROCEDURE — 36415 COLL VENOUS BLD VENIPUNCTURE: CPT | Performed by: INTERNAL MEDICINE

## 2023-11-16 PROCEDURE — 93793 PR ANTICOAGULANT MGMT FOR PT TAKING WARFARIN: ICD-10-PCS | Mod: S$GLB,,,

## 2023-11-16 PROCEDURE — 93793 ANTICOAG MGMT PT WARFARIN: CPT | Mod: S$GLB,,,

## 2023-11-16 PROCEDURE — 85610 PROTHROMBIN TIME: CPT | Performed by: INTERNAL MEDICINE

## 2023-11-16 RX ORDER — ZOLPIDEM TARTRATE 12.5 MG/1
12.5 TABLET, FILM COATED, EXTENDED RELEASE ORAL NIGHTLY PRN
Qty: 30 TABLET | Refills: 0 | Status: SHIPPED | OUTPATIENT
Start: 2023-11-16 | End: 2023-12-11 | Stop reason: SDUPTHER

## 2023-11-16 NOTE — TELEPHONE ENCOUNTER
Unable to retrieve patient chart and identify care due.  [facility] Embedded Care Due Messages. Reference number: [id number].   11/16/2023 1:57:41 PM CST

## 2023-11-20 ENCOUNTER — PATIENT MESSAGE (OUTPATIENT)
Dept: TRANSPLANT | Facility: CLINIC | Age: 58
End: 2023-11-20
Payer: MEDICARE

## 2023-11-20 ENCOUNTER — ANTI-COAG VISIT (OUTPATIENT)
Dept: CARDIOLOGY | Facility: CLINIC | Age: 58
End: 2023-11-20
Payer: MEDICARE

## 2023-11-20 ENCOUNTER — LAB VISIT (OUTPATIENT)
Dept: LAB | Facility: HOSPITAL | Age: 58
End: 2023-11-20
Attending: INTERNAL MEDICINE
Payer: MEDICARE

## 2023-11-20 DIAGNOSIS — Z95.811 LVAD (LEFT VENTRICULAR ASSIST DEVICE) PRESENT: ICD-10-CM

## 2023-11-20 DIAGNOSIS — Z79.01 LONG TERM (CURRENT) USE OF ANTICOAGULANTS: ICD-10-CM

## 2023-11-20 LAB
INR PPP: 2.5 (ref 0.8–1.2)
PROTHROMBIN TIME: 24.6 SEC (ref 9–12.5)

## 2023-11-20 PROCEDURE — 85610 PROTHROMBIN TIME: CPT | Performed by: INTERNAL MEDICINE

## 2023-11-20 PROCEDURE — 93793 ANTICOAG MGMT PT WARFARIN: CPT | Mod: S$GLB,,,

## 2023-11-20 PROCEDURE — 36415 COLL VENOUS BLD VENIPUNCTURE: CPT | Performed by: INTERNAL MEDICINE

## 2023-11-20 PROCEDURE — 93793 PR ANTICOAGULANT MGMT FOR PT TAKING WARFARIN: ICD-10-PCS | Mod: S$GLB,,,

## 2023-11-20 RX ORDER — WARFARIN SODIUM 5 MG/1
5-7.5 TABLET ORAL DAILY
Qty: 135 TABLET | Refills: 3 | Status: SHIPPED | OUTPATIENT
Start: 2023-11-20 | End: 2024-11-19

## 2023-11-20 RX ORDER — LEVOTHYROXINE SODIUM 125 UG/1
125 TABLET ORAL
Qty: 30 TABLET | Refills: 11 | Status: SHIPPED | OUTPATIENT
Start: 2023-11-20

## 2023-11-21 ENCOUNTER — PATIENT MESSAGE (OUTPATIENT)
Dept: FAMILY MEDICINE | Facility: CLINIC | Age: 58
End: 2023-11-21
Payer: MEDICARE

## 2023-11-27 ENCOUNTER — ANTI-COAG VISIT (OUTPATIENT)
Dept: CARDIOLOGY | Facility: CLINIC | Age: 58
End: 2023-11-27
Payer: MEDICARE

## 2023-11-27 ENCOUNTER — LAB VISIT (OUTPATIENT)
Dept: LAB | Facility: HOSPITAL | Age: 58
End: 2023-11-27
Attending: INTERNAL MEDICINE
Payer: MEDICARE

## 2023-11-27 DIAGNOSIS — Z79.01 LONG TERM (CURRENT) USE OF ANTICOAGULANTS: ICD-10-CM

## 2023-11-27 DIAGNOSIS — Z95.811 LVAD (LEFT VENTRICULAR ASSIST DEVICE) PRESENT: ICD-10-CM

## 2023-11-27 LAB
INR PPP: 2.3 (ref 0.8–1.2)
PROTHROMBIN TIME: 22.7 SEC (ref 9–12.5)

## 2023-11-27 PROCEDURE — 93793 PR ANTICOAGULANT MGMT FOR PT TAKING WARFARIN: ICD-10-PCS | Mod: S$GLB,,,

## 2023-11-27 PROCEDURE — 36415 COLL VENOUS BLD VENIPUNCTURE: CPT | Performed by: INTERNAL MEDICINE

## 2023-11-27 PROCEDURE — 93793 ANTICOAG MGMT PT WARFARIN: CPT | Mod: S$GLB,,,

## 2023-11-27 PROCEDURE — 85610 PROTHROMBIN TIME: CPT | Performed by: INTERNAL MEDICINE

## 2023-11-28 ENCOUNTER — TELEPHONE (OUTPATIENT)
Dept: TRANSPLANT | Facility: CLINIC | Age: 58
End: 2023-11-28
Payer: MEDICARE

## 2023-11-28 NOTE — TELEPHONE ENCOUNTER
Attempted to speak with patient regarding equipment maintenance due at upcoming clinic appointment on 12/7/2023.  Asked patient to bring PPM, UBC, and battery # 5 with them to next appointment for maintenance.  It is medically necessary to ensure patient has properly functioning equipment and wearables to prevent infection, injury or death to patient.

## 2023-12-02 DIAGNOSIS — E53.8 B12 DEFICIENCY: ICD-10-CM

## 2023-12-04 RX ORDER — CYANOCOBALAMIN 1000 UG/ML
1000 INJECTION, SOLUTION INTRAMUSCULAR; SUBCUTANEOUS WEEKLY
Qty: 12 ML | Refills: 1 | Status: SHIPPED | OUTPATIENT
Start: 2023-12-04 | End: 2023-12-11 | Stop reason: SDUPTHER

## 2023-12-04 NOTE — TELEPHONE ENCOUNTER
Last Office Visit Info:   The patient's last visit with Diego Daniel MD was on 10/5/2023.    
[facility]

## 2023-12-05 ENCOUNTER — CLINICAL SUPPORT (OUTPATIENT)
Dept: CARDIOLOGY | Facility: HOSPITAL | Age: 58
End: 2023-12-05
Attending: INTERNAL MEDICINE
Payer: MEDICARE

## 2023-12-05 DIAGNOSIS — I42.8 NICM (NONISCHEMIC CARDIOMYOPATHY): ICD-10-CM

## 2023-12-05 PROCEDURE — 93282 PRGRMG EVAL IMPLANTABLE DFB: CPT | Mod: 26,,, | Performed by: INTERNAL MEDICINE

## 2023-12-05 PROCEDURE — 93282 PRGRMG EVAL IMPLANTABLE DFB: CPT

## 2023-12-05 PROCEDURE — 93282 CARDIAC DEVICE CHECK - IN CLINIC & HOSPITAL: ICD-10-PCS | Mod: 26,,, | Performed by: INTERNAL MEDICINE

## 2023-12-06 ENCOUNTER — PATIENT MESSAGE (OUTPATIENT)
Dept: TRANSPLANT | Facility: CLINIC | Age: 58
End: 2023-12-06
Payer: MEDICARE

## 2023-12-07 ENCOUNTER — CLINICAL SUPPORT (OUTPATIENT)
Dept: TRANSPLANT | Facility: CLINIC | Age: 58
End: 2023-12-07
Payer: MEDICARE

## 2023-12-07 ENCOUNTER — LAB VISIT (OUTPATIENT)
Dept: LAB | Facility: HOSPITAL | Age: 58
End: 2023-12-07
Payer: MEDICARE

## 2023-12-07 ENCOUNTER — ANTI-COAG VISIT (OUTPATIENT)
Dept: CARDIOLOGY | Facility: CLINIC | Age: 58
End: 2023-12-07
Payer: MEDICARE

## 2023-12-07 ENCOUNTER — OFFICE VISIT (OUTPATIENT)
Dept: TRANSPLANT | Facility: CLINIC | Age: 58
End: 2023-12-07
Payer: MEDICARE

## 2023-12-07 VITALS — SYSTOLIC BLOOD PRESSURE: 80 MMHG | WEIGHT: 240 LBS | BODY MASS INDEX: 31.81 KG/M2 | TEMPERATURE: 98 F | HEIGHT: 73 IN

## 2023-12-07 DIAGNOSIS — I50.42 CHRONIC COMBINED SYSTOLIC AND DIASTOLIC CONGESTIVE HEART FAILURE: Primary | Chronic | ICD-10-CM

## 2023-12-07 DIAGNOSIS — Z86.79 HISTORY OF VENTRICULAR TACHYCARDIA: ICD-10-CM

## 2023-12-07 DIAGNOSIS — E03.2 HYPOTHYROIDISM DUE TO AMIODARONE: Chronic | ICD-10-CM

## 2023-12-07 DIAGNOSIS — R53.81 PHYSICAL DEBILITY: ICD-10-CM

## 2023-12-07 DIAGNOSIS — G47.33 OSA (OBSTRUCTIVE SLEEP APNEA): Chronic | ICD-10-CM

## 2023-12-07 DIAGNOSIS — Z95.811 HEART REPLACED BY HEART ASSIST DEVICE: Primary | ICD-10-CM

## 2023-12-07 DIAGNOSIS — Z95.811 LVAD (LEFT VENTRICULAR ASSIST DEVICE) PRESENT: Chronic | ICD-10-CM

## 2023-12-07 DIAGNOSIS — Z95.811 HEART REPLACED BY HEART ASSIST DEVICE: ICD-10-CM

## 2023-12-07 DIAGNOSIS — R26.89 POOR BALANCE: ICD-10-CM

## 2023-12-07 DIAGNOSIS — N18.32 STAGE 3B CHRONIC KIDNEY DISEASE: ICD-10-CM

## 2023-12-07 DIAGNOSIS — I73.9 PVD (PERIPHERAL VASCULAR DISEASE): ICD-10-CM

## 2023-12-07 DIAGNOSIS — Z86.16 HISTORY OF COVID-19: ICD-10-CM

## 2023-12-07 DIAGNOSIS — T46.2X1A HYPOTHYROIDISM DUE TO AMIODARONE: Chronic | ICD-10-CM

## 2023-12-07 DIAGNOSIS — Z79.01 ANTICOAGULATION MONITORING, INR RANGE 2-3: ICD-10-CM

## 2023-12-07 LAB
ALBUMIN SERPL BCP-MCNC: 4 G/DL (ref 3.5–5.2)
ALP SERPL-CCNC: 112 U/L (ref 55–135)
ALT SERPL W/O P-5'-P-CCNC: 33 U/L (ref 10–44)
ANION GAP SERPL CALC-SCNC: 10 MMOL/L (ref 8–16)
AST SERPL-CCNC: 34 U/L (ref 10–40)
BASOPHILS # BLD AUTO: 0.04 K/UL (ref 0–0.2)
BASOPHILS NFR BLD: 1.1 % (ref 0–1.9)
BILIRUB DIRECT SERPL-MCNC: 0.2 MG/DL (ref 0.1–0.3)
BILIRUB SERPL-MCNC: 0.5 MG/DL (ref 0.1–1)
BNP SERPL-MCNC: 209 PG/ML (ref 0–99)
BUN SERPL-MCNC: 17 MG/DL (ref 6–20)
CALCIUM SERPL-MCNC: 9.5 MG/DL (ref 8.7–10.5)
CHLORIDE SERPL-SCNC: 104 MMOL/L (ref 95–110)
CO2 SERPL-SCNC: 24 MMOL/L (ref 23–29)
CREAT SERPL-MCNC: 2.1 MG/DL (ref 0.5–1.4)
CRP SERPL-MCNC: 8.2 MG/L (ref 0–8.2)
DIFFERENTIAL METHOD: ABNORMAL
EOSINOPHIL # BLD AUTO: 0.2 K/UL (ref 0–0.5)
EOSINOPHIL NFR BLD: 6.7 % (ref 0–8)
ERYTHROCYTE [DISTWIDTH] IN BLOOD BY AUTOMATED COUNT: 16 % (ref 11.5–14.5)
EST. GFR  (NO RACE VARIABLE): 36 ML/MIN/1.73 M^2
GLUCOSE SERPL-MCNC: 86 MG/DL (ref 70–110)
GRAM STN SPEC: NORMAL
GRAM STN SPEC: NORMAL
HCT VFR BLD AUTO: 47.8 % (ref 40–54)
HGB BLD-MCNC: 14.7 G/DL (ref 14–18)
IMM GRANULOCYTES # BLD AUTO: 0.01 K/UL (ref 0–0.04)
IMM GRANULOCYTES NFR BLD AUTO: 0.3 % (ref 0–0.5)
INR PPP: 2.4 (ref 0.8–1.2)
LDH SERPL L TO P-CCNC: 213 U/L (ref 110–260)
LYMPHOCYTES # BLD AUTO: 1.5 K/UL (ref 1–4.8)
LYMPHOCYTES NFR BLD: 41.1 % (ref 18–48)
MAGNESIUM SERPL-MCNC: 2.3 MG/DL (ref 1.6–2.6)
MCH RBC QN AUTO: 28.7 PG (ref 27–31)
MCHC RBC AUTO-ENTMCNC: 30.8 G/DL (ref 32–36)
MCV RBC AUTO: 93 FL (ref 82–98)
MONOCYTES # BLD AUTO: 0.4 K/UL (ref 0.3–1)
MONOCYTES NFR BLD: 9.7 % (ref 4–15)
NEUTROPHILS # BLD AUTO: 1.5 K/UL (ref 1.8–7.7)
NEUTROPHILS NFR BLD: 41.1 % (ref 38–73)
NRBC BLD-RTO: 0 /100 WBC
PHOSPHATE SERPL-MCNC: 2.5 MG/DL (ref 2.7–4.5)
PLATELET # BLD AUTO: 199 K/UL (ref 150–450)
PMV BLD AUTO: 10.7 FL (ref 9.2–12.9)
POTASSIUM SERPL-SCNC: 4 MMOL/L (ref 3.5–5.1)
PREALB SERPL-MCNC: 26 MG/DL (ref 20–43)
PROT SERPL-MCNC: 8.5 G/DL (ref 6–8.4)
PROTHROMBIN TIME: 24.8 SEC (ref 9–12.5)
RBC # BLD AUTO: 5.12 M/UL (ref 4.6–6.2)
SODIUM SERPL-SCNC: 138 MMOL/L (ref 136–145)
WBC # BLD AUTO: 3.6 K/UL (ref 3.9–12.7)

## 2023-12-07 PROCEDURE — 82248 BILIRUBIN DIRECT: CPT | Performed by: INTERNAL MEDICINE

## 2023-12-07 PROCEDURE — 80053 COMPREHEN METABOLIC PANEL: CPT | Performed by: INTERNAL MEDICINE

## 2023-12-07 PROCEDURE — 87070 CULTURE OTHR SPECIMN AEROBIC: CPT | Performed by: INTERNAL MEDICINE

## 2023-12-07 PROCEDURE — 86140 C-REACTIVE PROTEIN: CPT | Performed by: INTERNAL MEDICINE

## 2023-12-07 PROCEDURE — 99999 PR PBB SHADOW E&M-EST. PATIENT-LVL III: CPT | Mod: PBBFAC,,, | Performed by: NURSE PRACTITIONER

## 2023-12-07 PROCEDURE — 87077 CULTURE AEROBIC IDENTIFY: CPT | Performed by: INTERNAL MEDICINE

## 2023-12-07 PROCEDURE — 83735 ASSAY OF MAGNESIUM: CPT | Performed by: INTERNAL MEDICINE

## 2023-12-07 PROCEDURE — 3008F BODY MASS INDEX DOCD: CPT | Mod: CPTII,S$GLB,, | Performed by: NURSE PRACTITIONER

## 2023-12-07 PROCEDURE — 87186 SC STD MICRODIL/AGAR DIL: CPT | Performed by: INTERNAL MEDICINE

## 2023-12-07 PROCEDURE — 84134 ASSAY OF PREALBUMIN: CPT | Performed by: INTERNAL MEDICINE

## 2023-12-07 PROCEDURE — 87205 SMEAR GRAM STAIN: CPT | Performed by: INTERNAL MEDICINE

## 2023-12-07 PROCEDURE — 84100 ASSAY OF PHOSPHORUS: CPT | Performed by: INTERNAL MEDICINE

## 2023-12-07 PROCEDURE — 85610 PROTHROMBIN TIME: CPT | Performed by: INTERNAL MEDICINE

## 2023-12-07 PROCEDURE — 3008F PR BODY MASS INDEX (BMI) DOCUMENTED: ICD-10-PCS | Mod: CPTII,S$GLB,, | Performed by: NURSE PRACTITIONER

## 2023-12-07 PROCEDURE — 36415 COLL VENOUS BLD VENIPUNCTURE: CPT | Performed by: INTERNAL MEDICINE

## 2023-12-07 PROCEDURE — 99213 OFFICE O/P EST LOW 20 MIN: CPT | Mod: S$GLB,,, | Performed by: NURSE PRACTITIONER

## 2023-12-07 PROCEDURE — 87075 CULTR BACTERIA EXCEPT BLOOD: CPT | Performed by: INTERNAL MEDICINE

## 2023-12-07 PROCEDURE — 85025 COMPLETE CBC W/AUTO DIFF WBC: CPT | Performed by: INTERNAL MEDICINE

## 2023-12-07 PROCEDURE — 99999 PR PBB SHADOW E&M-EST. PATIENT-LVL III: ICD-10-PCS | Mod: PBBFAC,,, | Performed by: NURSE PRACTITIONER

## 2023-12-07 PROCEDURE — 99213 PR OFFICE/OUTPT VISIT, EST, LEVL III, 20-29 MIN: ICD-10-PCS | Mod: S$GLB,,, | Performed by: NURSE PRACTITIONER

## 2023-12-07 PROCEDURE — 83880 ASSAY OF NATRIURETIC PEPTIDE: CPT | Performed by: INTERNAL MEDICINE

## 2023-12-07 PROCEDURE — 83615 LACTATE (LD) (LDH) ENZYME: CPT | Performed by: INTERNAL MEDICINE

## 2023-12-07 NOTE — PROGRESS NOTES
Patient was seen today in clinic. PPM and UBC maintenance complete. Issued 1 new 14 V battery.      Equipment:  Any Equipment Issues: None noted     Emergency Bag  Emergency Equipment With Patient: Yes  Condition of emergency bag: Good  Contents of emergency bag: Backup controller, 2 fully charged batteries, 2 battery clips, reference cards    Maintenance Tracking  Patient has monthly checklist today: No  Patient correctly utilizing monthly checklist: No  Educated patient on utilizing monthly checklist correctly and importance of this: Yes    Equipment Inspection  Inspected patient's equipment today: Yes  Equipment clean and free of debris, including locking mechanism: Yes  Cleaned equipment today and educated patient on how to do this: Yes    Manual and visual inspection of driveline: No  Kinks, rescue tape, tears: No  Rescue tape applied: No  Clamshell repair: No  Perc lead repair: No    Power Module  Power module maintenance due: 12/2024, completed today: Yes  Power module serial number: [id number]  Power module battery SN: [id number]    Universal Battery Charger  UBC serial number: [id number]  UBC maintenance due:12/2024  UBC maintenance completed today Yes    Batteries  Batteries checked for calibration: Yes  Batteries calibrated today: No  Which batteries: None  Educated patient on how and why to calibrate batteries: Yes    Equipment Removed   12 V battery for PPM: [id number]  Batteries:    [id number] 2/2024       Equipment Issued   12 V battery for PPM: [id number]  Batteries:    [id number] 2/2024       It is medically necessary to ensure patient has properly functioning equipment and wearables to prevent infection, injury or death to patient.   Waveforms sent: No

## 2023-12-07 NOTE — PROGRESS NOTES
Subjective:   Patient ID:  Tim Richards is a 58 y.o. male who presents for LVAD followup visit.    Implant Date: Heartmate II on 3/8/2018 (outflow graft changed 3/9/2018), exchanged to Heartmate 3 on 7/13/2022 9/8/2023    10:19 AM 8/31/2023    11:48 AM 5/31/2023     2:27 PM 4/13/2023     1:27 PM 2/23/2023     1:52 PM 1/29/2023     4:00 PM 1/29/2023    12:00 PM   TXP SACHI INTERROGATIONS   Type  HeartMate3 HeartMate3 HeartMate3 HeartMate3     Flow  5.4 5.5 5.4 5.6     Speed  6200 5400 6400 6400     PI  1.9 1.3 2.2 4.1     Power (Jackson)  5.3 5.7 5.6 5.7     LSL  5800 6000 6000 6000     Pulsatility No Pulse No Pulse Pulse No Pulse Intermittent pulse Pulse Pulse     HPI 59 yo black male with stage D HFrEF who was previously supported with a HM2 (implanted 3/8/2018 as DT-VAD) but required pump exchange (HM3 pump exchange 7/13/2022) for thrombosis of pump post COVID-19 PNA and AHRF. His post-op course following pump exchange was prolonged and complicated by worsening cardiogenic shock/RV failure requiring VA-ECMO. He also had recurrent VT as well as atrial flutter with RVR and is on chronic amiodarone and mexiletine. In June/July 2022 he was tapered of steroids (on for amiodarone hyperthyroid) due to concern for avascular necrosis of hip. 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 He completed course of ertapenem and voriconazole.      Today comes for his regular VAD visit and has no complaints. Doing well at home. Denies CP, palpitations, syncope, presyncope, fevers, n/v/d, PND, orthopnea. Using a walker today but mostly for his equipment. 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 Labs unremarkable so far.      Review of Systems  "  Constitutional: Negative.   HENT: Negative.     Eyes: Negative.    Cardiovascular: Negative.    Respiratory: Negative.     Endocrine: Negative.    Hematologic/Lymphatic: Negative.    Skin: Negative.    Musculoskeletal: Negative.      Objective:   Blood pressure (!) 80/0, temperature 98.4 °F (36.9 °C), temperature source Oral, height 6' 1" (1.854 m), weight 108.9 kg (240 lb).body mass index is 31.66 kg/m².    Physical Exam  Vitals and nursing note reviewed.   Constitutional:       Appearance: He is well-developed.   HENT:      Head: Normocephalic.   Eyes:      Conjunctiva/sclera: Conjunctivae normal.      Pupils: Pupils are equal, round, and reactive to light.   Neck:      Vascular: No JVD.   Cardiovascular:      Rate and Rhythm: Normal rate and regular rhythm.      Heart sounds: Murmur heard.   Pulmonary:      Effort: Pulmonary effort is normal.      Breath sounds: Normal breath sounds.   Abdominal:      General: Bowel sounds are normal.      Palpations: Abdomen is soft.   Musculoskeletal:         General: Normal range of motion.      Cervical back: Normal range of motion and neck supple.   Skin:     General: Skin is warm and dry.   Neurological:      Mental Status: He is alert and oriented to person, place, and time.       Lab Results   Component Value Date    WBC 3.60 (L) 12/07/2023    HGB 14.7 12/07/2023    HCT 47.8 12/07/2023    MCV 93 12/07/2023     12/07/2023    CO2 24 12/07/2023    CREATININE 2.1 (H) 12/07/2023    CALCIUM 9.5 12/07/2023    ALBUMIN 4.0 12/07/2023    AST 34 12/07/2023     (H) 12/07/2023    ALT 33 12/07/2023     08/31/2023       Lab Results   Component Value Date    INR 2.4 (H) 12/07/2023    INR 2.3 (H) 11/27/2023    INR 2.5 (H) 11/20/2023       BNP   Date Value Ref Range Status   12/07/2023 209 (H) 0 - 99 pg/mL Final     Comment:     Values of less than 100 pg/ml are consistent with non-CHF populations.   09/11/2023 557 (H) 0 - 99 pg/mL Final     Comment:     Values of " less than 100 pg/ml are consistent with non-CHF populations.   09/08/2023 83 0 - 99 pg/mL Final     Comment:     Values of less than 100 pg/ml are consistent with non-CHF populations.       LD   Date Value Ref Range Status   08/31/2023 193 110 - 260 U/L Final     Comment:     Results are increased in hemolyzed samples.   05/31/2023 199 110 - 260 U/L Final     Comment:     Results are increased in hemolyzed samples.   04/13/2023 204 110 - 260 U/L Final     Comment:     Results are increased in hemolyzed samples.       Labs were reviewed with the patient.    No results found for this or any previous visit.    No results found for this or any previous visit.      Assessment:      1. LVAD (left ventricular assist device) present      Plan:   No changes to current regimen.  F/U on DLES culture.   Patient is now NYHA II  Recommend 2 gram sodium restriction and 1500cc fluid restriction.  Encourage physical activity with graded exercise program.  Requested patient to weigh themselves daily, and to notify us if their weight increases by more than 3 lbs in 1 day or 5 lbs in 1 week.     Patient advised that it is recommended that all patients, and their close contacts and household members receive Covid vaccination.    Listed for transplant: No    UNOS Patient Status  Functional Status: 80% - Normal activity with effort: some symptoms of disease  Physical Capacity: Limited Mobility  Working for Income: No  If no, reason not working: Demands of Treatment

## 2023-12-07 NOTE — LETTER
December 7, 2023        Nader Chiu  [address] [place], [place] [address]  [address]  [place] [address]  Phone: [phone]  Fax: [phone]     Nader Chiu  [address] [place], [place] [address] [address]  Phone: [phone]  Fax: [phone]             [id number]  [address]  [place] [address]  Phone: [phone]   Patient: Tim Richards   MR Number: [id number]   YOB: 1965   Date of Visit: 12/7/2023       Dear Dr. Nader Chiu, Nader Chiu    Thank you for referring Tim Richards to me for evaluation. Attached you will find relevant portions of my assessment and plan of care.    If you have questions, please do not hesitate to call me. I look forward to following Tim Richards along with you.    Sincerely,    Loki Randall NP    Enclosure    If you would like to receive this communication electronically, please contact [email or web address] or [phone] to request [email or web address] access.    [email or web address] is a tool which provides read-only access to select patient information with whom you have a relationship. Its easy to use and provides real time access to review your patients record including encounter summaries, notes, results, and demographic information.    If you feel you have received this communication in error or would no longer like to receive these types of communications, please e-mail [email or web address]

## 2023-12-08 ENCOUNTER — PATIENT MESSAGE (OUTPATIENT)
Dept: PSYCHIATRY | Facility: CLINIC | Age: 58
End: 2023-12-08
Payer: MEDICARE

## 2023-12-10 LAB
BACTERIA SPEC AEROBE CULT: ABNORMAL
BACTERIA SPEC AEROBE CULT: ABNORMAL

## 2023-12-11 DIAGNOSIS — E53.8 B12 DEFICIENCY: ICD-10-CM

## 2023-12-11 LAB — BACTERIA SPEC ANAEROBE CULT: NORMAL

## 2023-12-12 ENCOUNTER — TELEPHONE (OUTPATIENT)
Dept: TRANSPLANT | Facility: CLINIC | Age: 58
End: 2023-12-12
Payer: MEDICARE

## 2023-12-12 DIAGNOSIS — T82.7XXA INFECTION ASSOCIATED WITH DRIVELINE OF LEFT VENTRICULAR ASSIST DEVICE (LVAD): ICD-10-CM

## 2023-12-12 DIAGNOSIS — Z95.811 LVAD (LEFT VENTRICULAR ASSIST DEVICE) PRESENT: Primary | ICD-10-CM

## 2023-12-12 RX ORDER — CYANOCOBALAMIN 1000 UG/ML
1000 INJECTION, SOLUTION INTRAMUSCULAR; SUBCUTANEOUS WEEKLY
Qty: 12 ML | Refills: 1 | Status: SHIPPED | OUTPATIENT
Start: 2023-12-12 | End: 2024-01-06 | Stop reason: SDUPTHER

## 2023-12-12 RX ORDER — DOXYCYCLINE 100 MG/1
100 CAPSULE ORAL 2 TIMES DAILY
Qty: 60 CAPSULE | Refills: 0 | Status: SHIPPED | OUTPATIENT
Start: 2023-12-12 | End: 2024-01-11

## 2023-12-12 RX ORDER — ZOLPIDEM TARTRATE 12.5 MG/1
12.5 TABLET, FILM COATED, EXTENDED RELEASE ORAL NIGHTLY PRN
Qty: 30 TABLET | Refills: 0 | Status: SHIPPED | OUTPATIENT
Start: 2023-12-12 | End: 2024-01-06 | Stop reason: SDUPTHER

## 2023-12-12 NOTE — TELEPHONE ENCOUNTER
Called patient regarding positive DLES cultures. Informed patient that they recommend starting him on doxycycline, informed him that we are working on getting the prescription now. Informed patient that he will need to follow up with the infectious disease clinic as well. Patient verbalized understanding.

## 2023-12-12 NOTE — TELEPHONE ENCOUNTER
Unable to retrieve patient chart and identify care due.  [facility] Embedded Care Due Messages. Reference number: [id number].   12/11/2023 11:51:07 PM CST

## 2023-12-12 NOTE — PROGRESS NOTES
Date of Implant with Heartmate 3 LVAD: 7/13/22    PATIENT ARRIVED IN CLINIC:  Ambulatory   Accompanied by: self  Complaints/reason for visit today: routine    Vitals  Temperature, oral:   Temp Readings from Last 1 Encounters:   12/07/23 98.4 °F (36.9 °C) (Oral)     Blood Pressure:   BP Readings from Last 3 Encounters:   12/07/23 (!) 80/0   10/24/23 (!) 88/0   09/08/23 (!) 90/0        VAD Interrogation:      9/8/2023    10:19 AM 8/31/2023    11:48 AM 5/31/2023     2:27 PM   TXP SACHI INTERROGATIONS   Type  HeartMate3 HeartMate3   Flow  5.4 5.5   Speed  6200 5400   PI  1.9 1.3   Power ([place])  5.3 5.7   LSL  5800 6000   Pulsatility No Pulse No Pulse Pulse     HCT:   Lab Results   Component Value Date    HCT 47.8 12/07/2023    HCT 31 (L) 09/27/2022       History Log: n/a  Problems / Issues / Alarms with VAD if any: None noted  VAD Sounds: HM3 Smooth    VAD Binder With Patient: No  Reviewed VAD Numbers In Binder: No    Equipment:  Emergency Equipment With Patient: Yes  Any Equipment Issues: None noted   It is medically necessary to ensure patient has properly functioning equipment and wearables to prevent infection, injury or death to patient.     DLES Assessment:  Appearance of DLES: 2 with drainage. Culture taken.     Antibiotics: NO  Velour: No  Manual & Visual Inspection Of Driveline: No kinks or tears noted  Stabilization Device In Use: yes, sun securement device    Heartmate 3 Module Cable:  No yellow exposed and Attempted to unscrew modular cable to ensure it will be able to come lose in the event we ever need to change the modular cable while patient held the driveline in place so it would not move. Modular cable connection able to be unscrewed and re-tightened. Instructed pt to perform this weekly.    Patient MyChart Questionnaire:        Assessment/ Quality of Life [place]:   Complaints Of Nausea / Vomiting: None noted    Appearance and Frequency Of Stools: normal and formed without blood & daily  Color Of  Urine: clear/yellow  Pain: NO  Coping/Depression/Anxiety: coping okay  Sleep Habits: 8-9 hrs /night  Sleep Aids: None noted  Showering: Yes, reminded to change dressing immediately after drying off  Self- Care I have no problems with self- care  Mobility I have no problems walking about  Usual Activities I have no problems with performing my usual activities  Activity/Exercise: walking   Driving: Yes. Reminded to pull over should there be an alarm before looking down at controller.  Additional Comments: n/a    DLES Dressing Care:   Frequency of Dressing Changes: every three days & daily kit   Pt In Need Of Management Kits?:no   It is medically necessary to have VAD management kits in order to prevent infection or to assist in the healing of an infected DLES.    Labs:    Chemistry        Component Value Date/Time     12/07/2023 1225    K 4.0 12/07/2023 1225     12/07/2023 1225    CO2 24 12/07/2023 1225    BUN 17 12/07/2023 1225    CREATININE 2.1 (H) 12/07/2023 1225    GLU 86 12/07/2023 1225        Component Value Date/Time    CALCIUM 9.5 12/07/2023 1225    ALKPHOS 112 12/07/2023 1225    AST 34 12/07/2023 1225    ALT 33 12/07/2023 1225    BILITOT 0.5 12/07/2023 1225    ESTGFRAFRICA 37.6 (A) 07/31/2022 2027    EGFRNONAA 32.5 (A) 07/31/2022 2027            Magnesium   Date Value Ref Range Status   12/07/2023 2.3 1.6 - 2.6 mg/dL Final       Lab Results   Component Value Date    WBC 3.60 (L) 12/07/2023    HGB 14.7 12/07/2023    HCT 47.8 12/07/2023    MCV 93 12/07/2023     12/07/2023       Lab Results   Component Value Date    INR 2.4 (H) 12/07/2023    INR 2.3 (H) 11/27/2023    INR 2.5 (H) 11/20/2023       BNP   Date Value Ref Range Status   12/07/2023 209 (H) 0 - 99 pg/mL Final     Comment:     Values of less than 100 pg/ml are consistent with non-CHF populations.   09/11/2023 557 (H) 0 - 99 pg/mL Final     Comment:     Values of less than 100 pg/ml are consistent with non-CHF populations.   09/08/2023  83 0 - 99 pg/mL Final     Comment:     Values of less than 100 pg/ml are consistent with non-CHF populations.       LD   Date Value Ref Range Status   12/07/2023 213 110 - 260 U/L Final     Comment:     Results are increased in hemolyzed samples.   08/31/2023 193 110 - 260 U/L Final     Comment:     Results are increased in hemolyzed samples.   05/31/2023 199 110 - 260 U/L Final     Comment:     Results are increased in hemolyzed samples.       Labs reviewed with patient: YES     Medication Reconciliation: per MA.  New Medication Detail Provided: no  Coumadin Managed by: [facility]    Education: Reviewed driveline care, emergency procedures, how to change the controller, alarms with patient, as well as discussed how to page the VAD coordinator in case of an emergency.   Educated patient/family that chest compressions are allowed in the event they are needed.    Reminded patient/caregiver not to touch their face and to cover their mouth when they cough or sneeze.   Vaccines: Pt informed that we are encouraging all VAD patients to receive the Flu and COVID vaccines and boosters. Informed pt that they can take tylenol but should avoid other NSAIDs.       Plans/Needs: Routine f/u. Culture taken of DLES. Currently not on antibx. 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 Will f/u cultures and ID if needed.     RTC in 3 months with echo and FLP.      Hurricane Season: No

## 2023-12-14 ENCOUNTER — ANTI-COAG VISIT (OUTPATIENT)
Dept: CARDIOLOGY | Facility: CLINIC | Age: 58
End: 2023-12-14
Payer: MEDICARE

## 2023-12-14 ENCOUNTER — LAB VISIT (OUTPATIENT)
Dept: LAB | Facility: HOSPITAL | Age: 58
End: 2023-12-14
Attending: INTERNAL MEDICINE
Payer: MEDICARE

## 2023-12-14 DIAGNOSIS — Z79.01 LONG TERM (CURRENT) USE OF ANTICOAGULANTS: ICD-10-CM

## 2023-12-14 DIAGNOSIS — Z95.811 LVAD (LEFT VENTRICULAR ASSIST DEVICE) PRESENT: ICD-10-CM

## 2023-12-14 LAB
INR PPP: 3 (ref 0.8–1.2)
PROTHROMBIN TIME: 29.9 SEC (ref 9–12.5)

## 2023-12-14 PROCEDURE — 93793 ANTICOAG MGMT PT WARFARIN: CPT | Mod: S$GLB,,,

## 2023-12-14 PROCEDURE — 93793 PR ANTICOAGULANT MGMT FOR PT TAKING WARFARIN: ICD-10-PCS | Mod: S$GLB,,,

## 2023-12-14 PROCEDURE — 85610 PROTHROMBIN TIME: CPT | Performed by: INTERNAL MEDICINE

## 2023-12-14 PROCEDURE — 36415 COLL VENOUS BLD VENIPUNCTURE: CPT | Performed by: INTERNAL MEDICINE

## 2023-12-21 ENCOUNTER — ANTI-COAG VISIT (OUTPATIENT)
Dept: CARDIOLOGY | Facility: CLINIC | Age: 58
End: 2023-12-21
Payer: MEDICARE

## 2023-12-21 ENCOUNTER — LAB VISIT (OUTPATIENT)
Dept: LAB | Facility: HOSPITAL | Age: 58
End: 2023-12-21
Attending: INTERNAL MEDICINE
Payer: MEDICARE

## 2023-12-21 DIAGNOSIS — Z95.811 LVAD (LEFT VENTRICULAR ASSIST DEVICE) PRESENT: ICD-10-CM

## 2023-12-21 DIAGNOSIS — Z79.01 LONG TERM (CURRENT) USE OF ANTICOAGULANTS: ICD-10-CM

## 2023-12-21 LAB
INR PPP: 2.9 (ref 0.8–1.2)
PROTHROMBIN TIME: 28.4 SEC (ref 9–12.5)

## 2023-12-21 PROCEDURE — 93793 PR ANTICOAGULANT MGMT FOR PT TAKING WARFARIN: ICD-10-PCS | Mod: S$GLB,,,

## 2023-12-21 PROCEDURE — 93793 ANTICOAG MGMT PT WARFARIN: CPT | Mod: S$GLB,,,

## 2023-12-21 PROCEDURE — 36415 COLL VENOUS BLD VENIPUNCTURE: CPT | Performed by: INTERNAL MEDICINE

## 2023-12-21 PROCEDURE — 85610 PROTHROMBIN TIME: CPT | Performed by: INTERNAL MEDICINE

## 2023-12-28 ENCOUNTER — LAB VISIT (OUTPATIENT)
Dept: LAB | Facility: HOSPITAL | Age: 58
End: 2023-12-28
Attending: INTERNAL MEDICINE
Payer: MEDICARE

## 2023-12-28 ENCOUNTER — ANTI-COAG VISIT (OUTPATIENT)
Dept: CARDIOLOGY | Facility: CLINIC | Age: 58
End: 2023-12-28
Payer: MEDICARE

## 2023-12-28 DIAGNOSIS — Z79.01 LONG TERM (CURRENT) USE OF ANTICOAGULANTS: ICD-10-CM

## 2023-12-28 DIAGNOSIS — Z95.811 LVAD (LEFT VENTRICULAR ASSIST DEVICE) PRESENT: ICD-10-CM

## 2023-12-28 LAB
INR PPP: 2.8 (ref 0.8–1.2)
PROTHROMBIN TIME: 27.9 SEC (ref 9–12.5)

## 2023-12-28 PROCEDURE — 36415 COLL VENOUS BLD VENIPUNCTURE: CPT | Performed by: INTERNAL MEDICINE

## 2023-12-28 PROCEDURE — 93793 PR ANTICOAGULANT MGMT FOR PT TAKING WARFARIN: ICD-10-PCS | Mod: S$GLB,,,

## 2023-12-28 PROCEDURE — 93793 ANTICOAG MGMT PT WARFARIN: CPT | Mod: S$GLB,,,

## 2023-12-28 PROCEDURE — 85610 PROTHROMBIN TIME: CPT | Performed by: INTERNAL MEDICINE

## 2024-01-01 ENCOUNTER — PATIENT MESSAGE (OUTPATIENT)
Dept: TRANSPLANT | Facility: CLINIC | Age: 59
End: 2024-01-01
Payer: MEDICARE

## 2024-01-02 ENCOUNTER — PATIENT MESSAGE (OUTPATIENT)
Dept: TRANSPLANT | Facility: CLINIC | Age: 59
End: 2024-01-02
Payer: MEDICARE

## 2024-01-02 DIAGNOSIS — I50.42 CHRONIC COMBINED SYSTOLIC AND DIASTOLIC CONGESTIVE HEART FAILURE: Primary | Chronic | ICD-10-CM

## 2024-01-02 NOTE — PROGRESS NOTES
OK to change to torsemide 40 mg from furosemide 40 mg twice a week--patient request.  The frequency of dosing would stay twice a week.  Take potassium as before on lasix.  Obtain BMP (lab) with routine INR check in 2-3 weeks

## 2024-01-04 ENCOUNTER — LAB VISIT (OUTPATIENT)
Dept: LAB | Facility: HOSPITAL | Age: 59
End: 2024-01-04
Attending: INTERNAL MEDICINE
Payer: MEDICARE

## 2024-01-04 ENCOUNTER — ANTI-COAG VISIT (OUTPATIENT)
Dept: CARDIOLOGY | Facility: CLINIC | Age: 59
End: 2024-01-04
Payer: MEDICARE

## 2024-01-04 DIAGNOSIS — Z95.811 HEART REPLACED BY HEART ASSIST DEVICE: ICD-10-CM

## 2024-01-04 LAB
INR PPP: 3 (ref 0.8–1.2)
PROTHROMBIN TIME: 29.7 SEC (ref 9–12.5)

## 2024-01-04 PROCEDURE — 93793 ANTICOAG MGMT PT WARFARIN: CPT | Mod: S$GLB,,,

## 2024-01-04 PROCEDURE — 36415 COLL VENOUS BLD VENIPUNCTURE: CPT | Performed by: INTERNAL MEDICINE

## 2024-01-04 PROCEDURE — 85610 PROTHROMBIN TIME: CPT | Performed by: INTERNAL MEDICINE

## 2024-01-04 RX ORDER — TORSEMIDE 20 MG/1
TABLET ORAL
Qty: 30 TABLET | Refills: 11 | Status: SHIPPED | OUTPATIENT
Start: 2024-01-04

## 2024-01-06 DIAGNOSIS — E53.8 B12 DEFICIENCY: ICD-10-CM

## 2024-01-06 NOTE — TELEPHONE ENCOUNTER
Unable to retrieve patient chart and identify care due.  [facility] Embedded Care Due Messages. Reference number: [id number].   1/06/2024 12:26:57 AM CST

## 2024-01-08 ENCOUNTER — PATIENT MESSAGE (OUTPATIENT)
Dept: PSYCHIATRY | Facility: CLINIC | Age: 59
End: 2024-01-08
Payer: MEDICARE

## 2024-01-08 RX ORDER — CYANOCOBALAMIN 1000 UG/ML
1000 INJECTION, SOLUTION INTRAMUSCULAR; SUBCUTANEOUS WEEKLY
Qty: 12 ML | Refills: 1 | Status: SHIPPED | OUTPATIENT
Start: 2024-01-08 | End: 2024-06-18

## 2024-01-08 RX ORDER — ZOLPIDEM TARTRATE 12.5 MG/1
12.5 TABLET, FILM COATED, EXTENDED RELEASE ORAL NIGHTLY PRN
Qty: 30 TABLET | Refills: 2 | Status: SHIPPED | OUTPATIENT
Start: 2024-01-08 | End: 2024-07-08

## 2024-01-11 ENCOUNTER — LAB VISIT (OUTPATIENT)
Dept: LAB | Facility: HOSPITAL | Age: 59
End: 2024-01-11
Attending: INTERNAL MEDICINE
Payer: MEDICARE

## 2024-01-11 ENCOUNTER — ANTI-COAG VISIT (OUTPATIENT)
Dept: CARDIOLOGY | Facility: CLINIC | Age: 59
End: 2024-01-11
Payer: MEDICARE

## 2024-01-11 DIAGNOSIS — Z79.01 LONG TERM (CURRENT) USE OF ANTICOAGULANTS: ICD-10-CM

## 2024-01-11 DIAGNOSIS — Z95.811 LVAD (LEFT VENTRICULAR ASSIST DEVICE) PRESENT: ICD-10-CM

## 2024-01-11 LAB
INR PPP: 4.1 (ref 0.8–1.2)
PROTHROMBIN TIME: 39.2 SEC (ref 9–12.5)

## 2024-01-11 PROCEDURE — 36415 COLL VENOUS BLD VENIPUNCTURE: CPT | Performed by: INTERNAL MEDICINE

## 2024-01-11 PROCEDURE — 85610 PROTHROMBIN TIME: CPT | Performed by: INTERNAL MEDICINE

## 2024-01-11 PROCEDURE — 93793 ANTICOAG MGMT PT WARFARIN: CPT | Mod: S$GLB,,,

## 2024-01-11 NOTE — PROGRESS NOTES
[facility]    2024 3:23 PM    Assessment/Plan:    Patient presents today with supratherapeutic INR.    Assessment of patient findings and chart review: significant DDI with mucinex DM not expected    Recommendation for patient's warfarin regimen: Hold dose today then resume current maintenance dose    Recommend repeat INR Tuesday  _________________________________________________________________    Tim Richards (58 y.o.) is followed by the [facility].    Anticoagulation Summary  As of 2024      INR goal:  2.0-3.0   TTR:  70.8 % (5.3 y)   INR used for dosin.1 (2024)   Warfarin maintenance plan:  7.5 mg (5 mg x 1.5) every Thu; 5 mg (5 mg x 1) all other days   Weekly warfarin total:  37.5 mg   Plan last modified:  Radha Eugene, PharmD (10/18/2023)   Next INR check:  2024   Target end date:      Indications    LVAD (left ventricular assist device) present (Resolved) [Z95.811]  Anticoagulation monitoring  INR range 2-3 (Resolved) [Z79.01]                 Anticoagulation Episode Summary       INR check location:  Clinic Lab    Preferred lab:      Send INR reminders to:  [facility] COUM[facility] LVAD    Comments:  LVAD// [facility]//Lab [facility] Results: [phone] [id number] Phone: [phone] [phone]//[address] [facility] [phone], fax [phone] //pt declined meter, see 10/11/22 & 22 notes// Bridge:NO (h/o bleeds)          Anticoagulation Care Providers       Provider Role Specialty Phone number    Antonio Hadley Jr., MD Responsible Cardiology [phone]

## 2024-01-11 NOTE — PROGRESS NOTES
Pt states he did eat a large can of green beans last night 1/10. Pt confirmed taking all correct doses. Pt states he has been taking Mucinex D for about 2 days. Pt is still on doxycycline. Pt denies any other changes to be noted.

## 2024-01-16 ENCOUNTER — OFFICE VISIT (OUTPATIENT)
Dept: PSYCHIATRY | Facility: CLINIC | Age: 59
End: 2024-01-16
Payer: MEDICARE

## 2024-01-16 DIAGNOSIS — F41.1 GENERALIZED ANXIETY DISORDER: Primary | ICD-10-CM

## 2024-01-16 DIAGNOSIS — G47.00 INSOMNIA, UNSPECIFIED TYPE: ICD-10-CM

## 2024-01-16 PROCEDURE — 1160F RVW MEDS BY RX/DR IN RCRD: CPT | Mod: CPTII,95,,

## 2024-01-16 PROCEDURE — 1159F MED LIST DOCD IN RCRD: CPT | Mod: CPTII,95,,

## 2024-01-16 PROCEDURE — 99214 OFFICE O/P EST MOD 30 MIN: CPT | Mod: 95,,,

## 2024-01-16 RX ORDER — MIRTAZAPINE 30 MG/1
30 TABLET, FILM COATED ORAL NIGHTLY
Qty: 90 TABLET | Refills: 1 | Status: SHIPPED | OUTPATIENT
Start: 2024-01-16 | End: 2024-04-18 | Stop reason: SDUPTHER

## 2024-01-16 RX ORDER — ALPRAZOLAM 1 MG/1
1 TABLET ORAL 2 TIMES DAILY PRN
Qty: 60 TABLET | Refills: 5 | Status: SHIPPED | OUTPATIENT
Start: 2023-01-18 | End: 2023-07-17

## 2024-01-16 RX ORDER — POTASSIUM CHLORIDE 20 MEQ/1
TABLET, EXTENDED RELEASE ORAL
Qty: 60 TABLET | Refills: 5 | Status: SHIPPED | OUTPATIENT
Start: 2024-01-16

## 2024-01-16 NOTE — PROGRESS NOTES
"Outpatient Psychiatry Follow-Up Visit   1/16/2024    Clinical Status of Patient:  Outpatient (Ambulatory)  The patient location is: [place]    Visit type: audiovisual    Face to Face time with patient:   35 minutes of total time spent on the encounter, which includes face to face time and non-face to face time preparing to see the patient (eg, review of tests), Obtaining and/or reviewing separately obtained history, Documenting clinical information in the electronic or other health record, Independently interpreting results (not separately reported) and communicating results to the patient/family/caregiver, or Care coordination (not separately reported).     Each patient to whom he or she provides medical services by telemedicine is:  (1) informed of the relationship between the physician and patient and the respective role of any other health care provider with respect to management of the patient; and (2) notified that he or she may decline to receive medical services by telemedicine and may withdraw from such care at any time.    Chief Complaint:  Tim Richards is a 58 y.o. male who presents today for follow-up of anxiety.  Met with patient.      Interval History and Content of Current Session 01/16/2024:  Pt is A+Ox 4.  Patients mood is "ok", affect appears congruent and appropriate. Pts thought process is normal and logical.  Pts speech is normal tone, normal rate, normal pitch, normal volume   Linear and logical, friendly and cooperative, normal eye contact, no psychomotor retardation.  Pt is calmly seated in chair during interview.     Patient states that he's been doing OK since her previous appointment. Continues to take Remeron 30MG QHS, Xanax 0.5 to 1MG BID PRN panic. Patient states that he went to physical therapy for approximately 2 months and regained some lower body strength. Patient states that this has assisted him in ambulating with minimal assistance. Patient states that he has fallen a " "couple times since our last appointment due to tripping on things around the house. Patient states that his vision is poor and he has a difficult time seeing at night. States falls are not related to Xanax use. We discussed placing Night Lights around the house to better see.    Pt reports taking medications as prescribed and behaving appropriately during interview today.  Denies SI/HI/AVH. Denies side effects of medications.  Pt reports sleeping well and normal appetite.   Denies recreational drug use. Pt reports 0 drinks per week, denies tobacco use, denies Vaping, denies Caffeine.        Interim Events: 10/19/2023  Pt is A+Ox 4.  Patients mood is "good", affect appears congruent and appropriate. Pts thought process is normal and logical.  Pts speech is normal tone, normal rate, normal pitch, normal volume   Linear and logical, friendly and cooperative, normal eye contact, no psychomotor retardation.  Pt is calmly seated in chair during interview. Pt is casually dressed and well groomed.       Pt states that he has been doing pretty good since his last visit. Pt is taking Remeron 30 mg QHS and Xanax 0.5-1 mg BID prn (takes 1 pill twice daily). Pt's PCP (Dr. Daniel) recently prescribed him Ambien 12.5 mg nightly prn for insomnia. Denies any side effects from the medications. He reports that he has increased anxiety with driving especially with traffic. He states that he did PT for 3 months for atrophy which has helped a lot with his falls. Pt has had 3-4 falls in 6 months. He reports that his cardiologist is advising that he get on the heart transplant list which he is still debating. He states that he's had to have 2 heart pumps replaced in the past and if he has more issues with this he will get on the transplant list. Pt's support system includes his wife. Pt would like to continue on his current medication regimen.     Pt is having stress with his daughters.     Pt reports  taking medications as prescribed and " "behaving appropriately during interview today.  Denies SI/HI/AVH. Denies side effects of medications.  Pt reports sleeping normal and normal appetite.   Denies recreational drug use. Pt reports 0 drinks per week, denies tobacco use, denies Vaping, denies Caffeine.      Prior visit :  Psych Interview 03/17/2023:   Tim Richards is a 57 y.o. male with past psychiatric history of ALONZO  presented to for initial evaluation and treatment for anxiety .     Pt is A+Ox 4.  Patients mood is "ok", affect appears congruent and appropriate. Pts thought process is normal and logical.  Pts speech is normal tone, normal rate, normal pitch, normal volume   Linear and logical, friendly and cooperative, good eye contact, no psychomotor retardation.  Pt is calmly seated in chair during interview.  Pt is casually dressed and well groomed.  Pt has LVAD bag next to him during clinic visit.       Patient was previously being seen by Dr Krueger for ALONZO, currently taking Remeron 30 and Xanax 0.5mg - 1mg BID daily for panic. Patient has an LVAD and is currently doing OK. Patient states that in 2022 he was hospitalized for three months due to heart failure complications. States that after hospitalization he went through outpatient rehab, states that he had to learn to walk again. Patient states that his support system consists of wife and LVAD team.  States that he has fell two times within the last month, states that both times he fell was due to tripping over his LVAD bag. States that he was not using Xanax at the time of the fall.  Patient states that takes Xanax 1mg two times per week for panic. Patient states that when he takes Xanax he stays seated for 1-2 hours to prevent.       Pt states that his biggest stressor includes possibly losing his disability. Patient states that disability needs to be renewed every six months, states that the disability office has been more adamant about not continuing his disability. Patient states " that this has increase in his anxiety.  Pt states that if he loses his disability he does not know how he will make ends meet. Patient states that due to his medical conditions he is unable to work at this time.     Denies prior hx of psychiatric hospitalizations. Denies hx of suicide attempts. Pt denies hx self harm. Pt denies hx hallucinations.  Pt denies hx of eating disorders.   Pt endorses hx trauma - . Denies physical/sexual abuse. Pt denies symptoms including nightmares, hypervigilance, flashbacks, avoidance behaviors, and disassociation.     Reports depression today as 3/10, and anxiety as 2/10.     Reports sleeping 9 hrs per night, and poor appetite.   Denies SI/HI/AVH. Denies side effects of medications.  Pt states that there support consists of wife  Denies recreational drug use. Pt reports 0 drinks per week, denies tobacco use, denies Vaping, denies Caffeine.       Current Medication:  Xanax 0.5-1 mg Daily      Past meds   Paroxetine   Zoloft      DX:  Pt denies hx symptoms/episodes of miguel.     Admits to symptoms of anxiety including excessive anxiety/worry/fear, more days than not, about numerous issues, difficulty controlling the worry, over thinking, rumination, restlessness, poor concentration, fatigue, and increased irritability. Denies panic attacks at this time.      Past Psychiatric History:   Previous Psychiatric Hospitalizations: NO  Previous Medication Trials: YES:      History of psychotherapy:  NO  Previous Suicide Attempts: NO  History of Violence:  NO  History of physical/sexual abuse: NO  Outpatient psychiatric provider(past): NO     Substance Abuse History:   Tobacco: NO  Alcohol: NO  Illicit Substances: NO  Detox/Rehab: NO     Neurological History:   Seizures: NO  Head trauma: YES: falls        Family Psychiatric History: No  Social History:  Developmental/Childhood:Achieved all developmental milestones timely  *Education:High School Diploma  Employment Status/Finances:Disabled    Relationship Status/Sexual Orientation: : Relationship intact  Children: 3  Housing Status: Home    history:  NO  Access to gun: YES:      Rastafari: Scientologist  Recreational activities: watch TV, work on truck  Person patient is closest to/confides in: Wife     Legal History:   Past Charges/Incarcerations:  No      Review of Systems     Review of Systems   Constitutional:  Negative for weight loss.   HENT:  Negative for tinnitus.    Eyes:  Negative for blurred vision.   Respiratory:  Negative for cough and shortness of breath.    Cardiovascular:  Negative for chest pain.   Gastrointestinal:  Negative for abdominal pain.   Genitourinary:  Negative for dysuria.   Musculoskeletal:  Negative for back pain and neck pain.   Skin:  Negative for rash.   Neurological:  Negative for dizziness, seizures and weakness.   Psychiatric/Behavioral:  Positive for depression. Negative for hallucinations, memory loss, substance abuse and suicidal ideas. The patient is nervous/anxious. The patient does not have insomnia.        Psychiatric Review Of Systems - Is patient experiencing or having changes in:  sleep: no  appetite: no  weight: no  energy/anergy: no  interest/pleasure/anhedonia: no  somatic symptoms: no  libido: no  anxiety/panic: no  guilty/hopelessness: no  concentration: no  S.I.B.s/risky behavior: no  Irritability: no  Racing thoughts: no  Impulsive behaviors: no  Paranoia: no  AVH: no      Past Medical, Family and Social History: The patient's past medical, family and social history have been reviewed and updated as appropriate within the electronic medical record - see encounter notes.      Current Medications:   Medication List with Changes/Refills   Current Medications    AMIODARONE (PACERONE) 200 MG TAB    Take 2 tablets (400 mg total) by mouth once daily.    AMLODIPINE (NORVASC) 10 MG TABLET    Take 1 tablet (10 mg total) by mouth once daily.    CYANOCOBALAMIN 1,000 MCG/ML INJECTION    Inject 1 mL  (1,000 mcg total) into the skin once a week. for 24 doses    FERROUS GLUCONATE (FERGON) 324 MG TABLET    Take 1 tablet (324 mg total) by mouth 2 (two) times daily with meals.    LEVOTHYROXINE (SYNTHROID) 125 MCG TABLET    Take 1 tablet (125 mcg total) by mouth before breakfast.    MAGNESIUM OXIDE (MAG-OX) 400 MG (241.3 MG MAGNESIUM) TABLET    Take 1 tablet (400 mg total) by mouth 2 (two) times daily.    MEXILETINE (MEXITIL) 250 MG CAP    Take 1 capsule (250 mg total) by mouth every 8 (eight) hours.    OMEGA-3 ACID ETHYL ESTERS (LOVAZA) 1 GRAM CAPSULE    Take 2 capsules (2 g total) by mouth 2 (two) times daily.    PANTOPRAZOLE (PROTONIX) 40 MG TABLET    Take 1 tablet (40 mg total) by mouth daily with lunch.    TORSEMIDE (DEMADEX) 20 MG TAB    Take 2 tablets (40mg) on Tuesdays and Thursdays.    WARFARIN (COUMADIN) 5 MG TABLET    Take 1-1.5 tablets (5-7.5 mg total) by mouth Daily. As directed by the [facility]    ZOLPIDEM (AMBIEN CR) 12.5 MG CR TABLET    Take 1 tablet (12.5 mg total) by mouth nightly as needed for Insomnia.    ZOLPIDEM (AMBIEN) 10 MG TAB    Take 1 tablet (10 mg total) by mouth nightly as needed (insomnia).   Changed and/or Refilled Medications    Modified Medication Previous Medication    ALPRAZOLAM (XANAX) 1 MG TABLET ALPRAZolam (XANAX) 1 MG tablet       Take 1 tablet (1 mg total) by mouth 2 (two) times daily as needed for Anxiety.    Take 1 tablet (1 mg total) by mouth 2 (two) times daily as needed for Anxiety.    MIRTAZAPINE (REMERON) 30 MG TABLET mirtazapine (REMERON) 30 MG tablet       Take 1 tablet (30 mg total) by mouth every evening.    Take 1 tablet (30 mg total) by mouth every evening.    POTASSIUM CHLORIDE SA (K-DUR,KLOR-CON) 20 MEQ TABLET potassium chloride SA (K-DUR,KLOR-CON) 20 MEQ tablet       Take 1 tablet with every lasix 40 mg dose    Take 1 tablet with every lasix 40 mg dose         Allergies:   Review of patient's allergies indicates:   Allergen Reactions    Lisinopril  "Anaphylaxis    Hydralazine     Hydralazine analogues      Chronic constipation, impotence, dizziness         Vitals   There were no vitals filed for this visit.         Labs/Imaging/Studies:   No results found for this or any previous visit (from the past 48 hour(s)).     No results found for: "PHENYTOIN", "PHENOBARB", "VALPROATE", "CBMZ"    Compliance: yes    Side effects: None    Risk Parameters:  Patient reports no suicidal ideation  Patient reports no homicidal ideation  Patient reports no self-injurious behavior  Patient reports no violent behavior    Exam (detailed: at least 9 elements; comprehensive: all 15 elements)   Constitutional  Vitals:  Most recent vital signs, dated less than 90 days prior to this appointment, were reviewed.   There were no vitals filed for this visit.       General:  unremarkable, age appropriate     Musculoskeletal  Muscle Strength/Tone:  no spasicity, no rigidity, no cogwheeling, no flaccidity, no paratonia, no dyskinesia, no dystonia, no tremor, no tic, no choreoathetosis, no atrophy   Gait & Station:  non-ataxic     Psychiatric  Speech:  no latency; no press   Mood & Affect:  steady  congruent and appropriate   Thought Process:  normal and logical   Associations:  intact   Thought Content:  normal, no suicidality, no homicidality, delusions, or paranoia   Insight:  intact, has awareness of illness   Judgement: behavior is adequate to circumstances, age appropriate   Orientation:  grossly intact, person, place, situation, time/date   Memory: intact for content of interview, grossly intact, memory >3 objects at five mins, able to remember recent events- Yes, able to remember remote events- Yes   Language: grossly intact, able to name, able to repeat   Attention Span & Concentration:  able to focus, completed tasks   Fund of Knowledge:  intact and appropriate to age and level of education, familiar with aspects of current personal life     Assessment and Diagnosis   Status/Progress: " Based on the examination today, the patient's problem(s) is/are well controlled.  New problems have not been presented today.   Co-morbidities are complicating management of the primary condition.     General Impression:      ICD-10-CM ICD-9-CM   1. Generalized anxiety disorder  F41.1 300.02   2. Insomnia, unspecified type  G47.00 780.52         Intervention/Counseling/Treatment Plan      Mood   Continue Remeron 30mg QHS - targeting insomnia  Continue Xanax 0.5-1mg BID PRN anxiety/panic 12/19/2023  -Okay to continue short course of Xanax.  Long-term goal is to not continue this medication.  Concern about starting patient on antidepressants due to possible effect on clotting factors. Pt has been on remeron for years and has done well, will continue at this time.    -Benefits Outweigh the Risk.  patient was extensively educated in the risks of taking Xanax with a history of falls. Patient states that he will not Ambulate two hours after taking Xanax.  - Pt is taking Ambien 12.5mg PRN insomnia.  This is not being prescribed by myself.  Pt extensively educated to not combine Ambien and Xanax.  Pt is amendable      I believe patient would benefit from talk therapy at this time. Patient denies the need for talk therapy, will bring up again during next appointment.        Discussed diagnosis, risks and benefits of proposed treatment above vs alternative treatments vs no treatment, and potential side effects of these treatments, and the inherent unpredictability of individual response to treatment.  The patient expresses understanding and gives informed consent to pursue treatment.  The potential benefits outweigh the potential risks. Patient has no other questions. Risks/adverse effects discussed at this time including but not limited to: GI side effects, sexual dysfunction, activation vs sedation, triggering of suicidal thoughts, and serotonin syndrome.   Discussed with patient, the potential adverse effects of  Benzodiazepines, including, but not limited to, drowsiness, dizziness, risk of falls, and abuse potential. Counseled patient on avoiding alcohol, while using this medication, due to the risk of respiratory depression. Patient instructed not to operate any heavy machinery or drive a vehicle while on this medication. Informed patient of the risks of continuous benzodiazepine use, including tolerance, dependence and withdrawals that may be life threatening, upon abrupt cessation. Also advised patient not to take benzodiazepines with opiates and/or other sedatives. The patient expresses an understanding of the above as well as the inherent unpredictability of said treatment.  The patient agrees that, currently, the benefits outweigh the risks, and would like to pursue said treatment at this time.      Serotonin syndrome   Mental status changes can include anxiety, restlessness, disorientation, and agitated delirium.    Autonomic manifestations can include diaphoresis, tachycardia, hyperthermia, hypertension, vomiting, and diarrhea   Neuromuscular hyperactivity can manifest as tremor, myoclonus, hyperreflexia, rigidity, hyperthermia, seizure, and bilateral Babinski sign.   Pt was informed that if they experience any of these symptoms to go the ED.     Difficulty Sleeping Behavioral Modification:  Implement stimulus control: [place] bedroom for sleep only. Leave bedroom when frustrated from not sleeping. Engage in relaxation before returning. Engage in activities during the day. AVOID >7-8 h time in bed  Avoid clock watching  Avoid thinking/worrying about sleep when trying to fall asleep  Limit caffeinated consumption  Make sure the bedroom is dark, quiet and cool    Safety Plan   Patient voices understanding and agreement with this plan  Provided crisis numbers  Encouraged patient to keep future appointments.  Instructed patient to call or message with questions or concerns  In the event of an emergency, including  suicidal ideation, patient was advised to go to the emergency room and/or call 911    Return to Clinic: 6 months    Psychotherapy:  Target symptoms: anxiety   Why chosen therapy is appropriate versus another modality: relevant to diagnosis, evidence based practice  Outcome monitoring methods: self-report, observation  Therapeutic intervention type: insight oriented psychotherapy, interactive psychotherapy  Topics discussed/themes: relationships difficulties, stress related to medical comorbidities, difficulty managing affect in interpersonal relationships, building skills sets for symptom management  The patient's response to the intervention is guarded. The patient's progress toward treatment goals is fair.   Duration of intervention: 15 minutes.    Total face to face time: 30 min  Total time (chart review, patient contact, documentation): 35 min    A diagnostic psychiatric evaluation was performed and responsiveness to treatment was assessed.  The patient demonstrates adequate ability/capacity to respond to treatment.    Osmar Mejia PA-C    *This note has been prepared using a combination of a dictation device and typing.  It has been checked for errors but some errors may still exist within the note as a result of speech recognition errors and/or typographical errors.

## 2024-01-17 ENCOUNTER — TELEPHONE (OUTPATIENT)
Dept: ELECTROPHYSIOLOGY | Facility: CLINIC | Age: 59
End: 2024-01-17
Payer: MEDICARE

## 2024-01-18 ENCOUNTER — ANTI-COAG VISIT (OUTPATIENT)
Dept: CARDIOLOGY | Facility: CLINIC | Age: 59
End: 2024-01-18
Payer: MEDICARE

## 2024-01-18 ENCOUNTER — LAB VISIT (OUTPATIENT)
Dept: LAB | Facility: HOSPITAL | Age: 59
End: 2024-01-18
Attending: INTERNAL MEDICINE
Payer: MEDICARE

## 2024-01-18 DIAGNOSIS — Z95.811 LVAD (LEFT VENTRICULAR ASSIST DEVICE) PRESENT: Chronic | ICD-10-CM

## 2024-01-18 DIAGNOSIS — Z79.01 LONG TERM (CURRENT) USE OF ANTICOAGULANTS: ICD-10-CM

## 2024-01-18 LAB
ALBUMIN SERPL BCP-MCNC: 3.9 G/DL (ref 3.5–5.2)
ALP SERPL-CCNC: 91 U/L (ref 55–135)
ALT SERPL W/O P-5'-P-CCNC: 24 U/L (ref 10–44)
ANION GAP SERPL CALC-SCNC: 5 MMOL/L (ref 8–16)
AST SERPL-CCNC: 38 U/L (ref 10–40)
BILIRUB SERPL-MCNC: 0.3 MG/DL (ref 0.1–1)
BNP SERPL-MCNC: 262 PG/ML (ref 0–99)
BUN SERPL-MCNC: 18 MG/DL (ref 6–20)
CALCIUM SERPL-MCNC: 9.5 MG/DL (ref 8.7–10.5)
CHLORIDE SERPL-SCNC: 105 MMOL/L (ref 95–110)
CO2 SERPL-SCNC: 28 MMOL/L (ref 23–29)
CREAT SERPL-MCNC: 2 MG/DL (ref 0.5–1.4)
EST. GFR  (NO RACE VARIABLE): 38 ML/MIN/1.73 M^2
GLUCOSE SERPL-MCNC: 73 MG/DL (ref 70–110)
INR PPP: 2.4 (ref 0.8–1.2)
POTASSIUM SERPL-SCNC: 4.2 MMOL/L (ref 3.5–5.1)
PROT SERPL-MCNC: 7.9 G/DL (ref 6–8.4)
PROTHROMBIN TIME: 23.7 SEC (ref 9–12.5)
SODIUM SERPL-SCNC: 138 MMOL/L (ref 136–145)

## 2024-01-18 PROCEDURE — 85610 PROTHROMBIN TIME: CPT | Performed by: INTERNAL MEDICINE

## 2024-01-18 PROCEDURE — 83880 ASSAY OF NATRIURETIC PEPTIDE: CPT | Performed by: INTERNAL MEDICINE

## 2024-01-18 PROCEDURE — 80053 COMPREHEN METABOLIC PANEL: CPT | Performed by: INTERNAL MEDICINE

## 2024-01-18 PROCEDURE — 93793 ANTICOAG MGMT PT WARFARIN: CPT | Mod: S$GLB,,,

## 2024-01-18 PROCEDURE — 36415 COLL VENOUS BLD VENIPUNCTURE: CPT | Performed by: INTERNAL MEDICINE

## 2024-01-18 NOTE — PROGRESS NOTES
[facility]    2024 3:39 PM    Assessment/Plan:    Patient presents today with therapeutic INR.    Assessment of patient findings and chart review: no significant findings     Recommendation for patient's warfarin regimen: Continue current maintenance dose    Recommend repeat INR in 1 week  _________________________________________________________________    Tim Richards (58 y.o.) is followed by the [facility].    Anticoagulation Summary  As of 2024      INR goal:  2.0-3.0   TTR:  70.7 % (5.3 y)   INR used for dosin.4 (2024)   Warfarin maintenance plan:  7.5 mg (5 mg x 1.5) every Thu; 5 mg (5 mg x 1) all other days   Weekly warfarin total:  37.5 mg   Plan last modified:  Radha Eugene, PharmD (10/18/2023)   Next INR check:  2024   Target end date:      Indications    LVAD (left ventricular assist device) present (Resolved) [Z95.811]  Anticoagulation monitoring  INR range 2-3 (Resolved) [Z79.01]                 Anticoagulation Episode Summary       INR check location:  Clinic Lab    Preferred lab:      Send INR reminders to:  [facility] LVAD    Comments:  LVAD// [facility]//Lab [place] Results: [phone] [facility]# [id number] Phone: [phone] [phone]//dc [facility] [phone], fax [phone] //pt declined meter, see 10/11/22 & 22 notes// Bridge:NO (h/o bleeds)          Anticoagulation Care Providers       Provider Role Specialty Phone number    Antonio Hadley Jr., MD Responsible Cardiology [phone]

## 2024-01-19 NOTE — PROGRESS NOTES
Fluoroscopic and ultrasound guided right percutaneous nephrostomy tube placement



: Dr. Geoavny Juárez



: Dr. Eder Mistry



Fluoroscopic time: 16.6 minutes. Total exposure and 97,840 mg/sq cm.



INDICATION: Worsening renal failure with moderate right hydronephrosis. The patient had previously pl
aced ureteral stents bilaterally on July 25, 2019. Renal ultrasound performed on July 27, 2019

demonstrated stable moderate right but improved mild left hydronephrosis. A left-sided ureteral jet w
as demonstrated on this renal ultrasound examination. Due to progressively worsening renal

function, a percutaneous nephrostomy was asked to be placed within the right renal collecting system.




TECHNIQUE: Informed consent was obtained. Patient was placed prone on the fluoroscopic table within [facility] room. Sites overlying both kidneys was prepped and draped in the usual sterile

fashion. Site overlying the right kidney was marked. Utilizing ultrasound guidance, the posterior low
er most calyx of the right kidney was percutaneous access. There was spontaneous return of slightly

blood-tinged urine. There was contrast opacification of the renal collecting system with antegrade ad
ministration of an Omnipaque 300 contrast solution. Following adequate distention of the renal

collecting system, an AccuStick introducer was utilized to gain access to the posterior lower most ri
ght renal calyx. A microguidewire was advanced into the renal pelvis. A small cannulated sheath was

guided into the renal collecting system. There was confirmation of placement of the sheath within the
 renal collecting system with aspiration of urine and antegrade administration of contrast.

Following this the microwire was replaced with a 0.038 J-wire. The J-wire was manipulated down the re
nal collecting system adjacent to the patient's right ureteral stent. Following this an 8 [place]

pigtail catheter was guided over the wire and into the renal collecting system. The catheter was then
 pigtailed in the region of the renal pelvis. The catheter was then fixated to the patient's skin

utilizing 0 silk suture. The site was then cleansed and bandage. The catheter was then put to gravity
 drainage. Patient tolerated the procedure without difficulty.



Medication: 1 mg of Versed and 125 mcg of IV fentanyl.



IMPRESSION: Successful ultrasound and fluoroscopic guided right percutaneous nephrostomy tube placeme
nt.



Reported By: Geovany Juárez 

Electronically Signed:  7/27/2019 5:39 PM Labs reviewed. Creatinine down to 2.0. No changes made.

## 2024-01-20 NOTE — PROGRESS NOTES
07/13/2022  Mejia CARLOS Shane Jr    Current provider:  Amy Rhodes MD    Device interrogation:  TXP LVAD INTERROGATIONS 7/13/2022 7/13/2022 7/13/2022 7/12/2022 7/12/2022 7/12/2022 7/12/2022   Type HeartMate II HeartMate II HeartMate II HeartMate II HeartMate II HeartMate II HeartMate II   Flow 7.1 7.4 7.0 7.0 7.0 6.9 6.8   Speed 9800 9800 9800 9800 9800 9800 9800   PI 2.6 2.5 2.7 2.6 2.6 2.8 2.8   Power (Jackson) 7.3 7.5 7.3 7.3 7.4 7.2 7.2   LSL 9400 9400 9400 9400 9400 9400 9400   Pulsatility Intermittent pulse Intermittent pulse Intermittent pulse Intermittent pulse Intermittent pulse Intermittent pulse Intermittent pulse          Rounded on Tim Richards to ensure all mechanical assist device settings (IABP or VAD) were appropriate and all parameters were within limits.  I was able to ensure all back up equipment was present, the staff had no issues, and the Perfusion Department daily rounding was complete.  Rounding performed in OR while patient awaiting HM2 to HM3 exchange.    For implantable VADs: Interrogation of Ventricular assist device was performed with analysis of device parameters and review of device function. I have personally reviewed the interrogation findings and agree with findings as stated.     In emergency, the nursing units have been notified to contact the perfusion department either by:  Calling [id number] from 630am to 4pm Mon thru Fri, utilizing the On-Call Finder functionality of Epic and searching for Perfusion, or by contacting the hospital  from 4pm to 630am and on weekends and asking to speak with the perfusionist on call.    8:11 AM     Chronic condition treated with amlodipine, losartan and torsemide per chart review. Not listed in medication reconciliation.   Labetalol drip initiated at referring facility.  Nicardipine drip initiated on arrival.   1/20 Drip discontinued. Transitioned to Coreg 3.125mg BID.   1/21 No vasoactive drips. NG tube and oral meds, PRN IV labetalol/hydralazine.

## 2024-01-24 ENCOUNTER — PATIENT MESSAGE (OUTPATIENT)
Dept: CARDIOTHORACIC SURGERY | Facility: CLINIC | Age: 59
End: 2024-01-24
Payer: MEDICARE

## 2024-01-24 DIAGNOSIS — Z95.811 LVAD (LEFT VENTRICULAR ASSIST DEVICE) PRESENT: Primary | ICD-10-CM

## 2024-01-24 NOTE — HPI
55 yo AAM with DCM, HBP, CHF stage D s/p HM 2, ICD lead replacement 03/09/2020, hyperthyroidism,  sent in from clinic with a ICD pocket site infection. Patient wife reports yellow pus oozing from site this past week. Was prescribed antibiotics on Monday 6/29/20, and advised to come get admitted that day, but patient reported he didn't think it was necessary at that time. Denies fever, but reports intermittent nausea and chills, and Left side of Left hand tingling.      VAD parameters at baseline.  Pt speed decreased to 9800 rpms from 10,000 on 06/12/2020    ICD device check - 07/01/2020 --. Device interrogation revealed HV x 1 on 6/27/20 @ 3:41 pm for MMVT, Type 2 break.,CL-188 bpm. Patient reported sitting in car bending over when it occurred, not doing anything strenuous. Patient denies LOC. Patient reports overall feeling palpitations, tired, no energy, and COLEMAN, even prior to HV therapy.         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   Patient's BUN and cr has been trending up. Counseled patient on importance of dialysis while awaiting renal transplant, pt declines at this time.There is no progress in renal transplant process due to insurance and pt not  yet obtaining pre-transplant labs.    Patient is not showing any signs of azotemia at this time.  Consulted nephrology.  Per nephrology she is at baseline creatinine.  Cleared from nephrology for discharge.

## 2024-01-25 ENCOUNTER — LAB VISIT (OUTPATIENT)
Dept: LAB | Facility: HOSPITAL | Age: 59
End: 2024-01-25
Attending: INTERNAL MEDICINE
Payer: MEDICARE

## 2024-01-25 ENCOUNTER — ANTI-COAG VISIT (OUTPATIENT)
Dept: CARDIOLOGY | Facility: CLINIC | Age: 59
End: 2024-01-25
Payer: MEDICARE

## 2024-01-25 DIAGNOSIS — Z79.01 LONG TERM (CURRENT) USE OF ANTICOAGULANTS: ICD-10-CM

## 2024-01-25 DIAGNOSIS — Z95.811 LVAD (LEFT VENTRICULAR ASSIST DEVICE) PRESENT: ICD-10-CM

## 2024-01-25 LAB
INR PPP: 1.9 (ref 0.8–1.2)
PROTHROMBIN TIME: 20.3 SEC (ref 9–12.5)

## 2024-01-25 PROCEDURE — 93793 ANTICOAG MGMT PT WARFARIN: CPT | Mod: S$GLB,,,

## 2024-01-25 PROCEDURE — 85610 PROTHROMBIN TIME: CPT | Performed by: INTERNAL MEDICINE

## 2024-01-25 PROCEDURE — 36415 COLL VENOUS BLD VENIPUNCTURE: CPT | Performed by: INTERNAL MEDICINE

## 2024-01-25 NOTE — PROGRESS NOTES
[facility]    2024 4:00 PM    Assessment/Plan:    Patient presents today with subtherapeutic  INR.    Assessment of patient findings and chart review: reports recent green intake    Recommendation for patient's warfarin regimen: Boost dose today to 10mg then resume current maintenance dose    Recommend repeat INR in 1 week  _________________________________________________________________    Tim Richards (58 y.o.) is followed by the [facility] [employer] [facility].    Anticoagulation Summary  As of 2024      INR goal:  2.0-3.0   TTR:  70.7 % (5.3 y)   INR used for dosin.9 (2024)   Warfarin maintenance plan:  7.5 mg (5 mg x 1.5) every Thu; 5 mg (5 mg x 1) all other days   Weekly warfarin total:  37.5 mg   Plan last modified:  Radha Eugene, PharmD (10/18/2023)   Next INR check:  2024   Target end date:      Indications    LVAD (left ventricular assist device) present (Resolved) [Z95.811]  Anticoagulation monitoring  INR range 2-3 (Resolved) [Z79.01]                 Anticoagulation Episode Summary       INR check location:  Clinic Lab    Preferred lab:      Send INR reminders to:  [facility] COUMADIN LVAD    Comments:  LVAD// [facility]//Lab [place] Results: [phone] [facility]# [id number] Phone: [phone] [phone]//dc [address] [facility] [phone], fax [phone] //pt declined meter, see 10/11/22 & 22 notes// Bridge:NO (h/o bleeds)          Anticoagulation Care Providers       Provider Role Specialty Phone number    Antonio Hadley Jr., MD [facility] Cardiology [phone]

## 2024-01-25 NOTE — PROGRESS NOTES
Patient reports correct Warfarin dose, uncertain if he missed a dose, leafy greens on 1/21/24, V-8 Juice 1/20/24, no other changes reported.

## 2024-01-31 ENCOUNTER — ANTI-COAG VISIT (OUTPATIENT)
Dept: CARDIOLOGY | Facility: CLINIC | Age: 59
End: 2024-01-31
Payer: MEDICARE

## 2024-01-31 ENCOUNTER — LAB VISIT (OUTPATIENT)
Dept: LAB | Facility: HOSPITAL | Age: 59
End: 2024-01-31
Attending: INTERNAL MEDICINE
Payer: MEDICARE

## 2024-01-31 ENCOUNTER — OFFICE VISIT (OUTPATIENT)
Dept: INFECTIOUS DISEASES | Facility: CLINIC | Age: 59
End: 2024-01-31
Payer: MEDICARE

## 2024-01-31 VITALS — OXYGEN SATURATION: 95 % | BODY MASS INDEX: 31.62 KG/M2 | WEIGHT: 238.56 LBS | HEIGHT: 73 IN

## 2024-01-31 DIAGNOSIS — T82.7XXA INFECTION ASSOCIATED WITH DRIVELINE OF VENTRICULAR ASSIST DEVICE: Primary | ICD-10-CM

## 2024-01-31 DIAGNOSIS — Z79.01 LONG TERM (CURRENT) USE OF ANTICOAGULANTS: ICD-10-CM

## 2024-01-31 DIAGNOSIS — Z95.811 LVAD (LEFT VENTRICULAR ASSIST DEVICE) PRESENT: ICD-10-CM

## 2024-01-31 LAB
INR PPP: 2.8 (ref 0.8–1.2)
PROTHROMBIN TIME: 29 SEC (ref 9–12.5)

## 2024-01-31 PROCEDURE — 36415 COLL VENOUS BLD VENIPUNCTURE: CPT | Performed by: INTERNAL MEDICINE

## 2024-01-31 PROCEDURE — 99999 PR PBB SHADOW E&M-EST. PATIENT-LVL III: CPT | Mod: PBBFAC,,,

## 2024-01-31 PROCEDURE — 3008F BODY MASS INDEX DOCD: CPT | Mod: CPTII,S$GLB,, | Performed by: STUDENT IN AN ORGANIZED HEALTH CARE EDUCATION/TRAINING PROGRAM

## 2024-01-31 PROCEDURE — 1160F RVW MEDS BY RX/DR IN RCRD: CPT | Mod: CPTII,S$GLB,, | Performed by: STUDENT IN AN ORGANIZED HEALTH CARE EDUCATION/TRAINING PROGRAM

## 2024-01-31 PROCEDURE — 99215 OFFICE O/P EST HI 40 MIN: CPT | Mod: S$GLB,,, | Performed by: STUDENT IN AN ORGANIZED HEALTH CARE EDUCATION/TRAINING PROGRAM

## 2024-01-31 PROCEDURE — 1159F MED LIST DOCD IN RCRD: CPT | Mod: CPTII,S$GLB,, | Performed by: STUDENT IN AN ORGANIZED HEALTH CARE EDUCATION/TRAINING PROGRAM

## 2024-01-31 PROCEDURE — 93793 ANTICOAG MGMT PT WARFARIN: CPT | Mod: S$GLB,,,

## 2024-01-31 PROCEDURE — 85610 PROTHROMBIN TIME: CPT | Performed by: INTERNAL MEDICINE

## 2024-01-31 RX ORDER — DOXYCYCLINE HYCLATE 100 MG
100 TABLET ORAL 2 TIMES DAILY
Qty: 74 TABLET | Refills: 0 | Status: SHIPPED | OUTPATIENT
Start: 2024-01-31 | End: 2024-03-08

## 2024-01-31 NOTE — PROGRESS NOTES
INFECTIOUS DISEASE CLINIC  01/31/2024     Subjective:      Chief Complaint:   Chief Complaint   Patient presents with    Follow-up         History of Present Illness:    This is a 58 y.o. 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  Patient known to ID, please see prior notes for full details. Since discharge from the hospital pt states he has been doing ok. Missed/canceled a few ID follow up appts due to transportation issues. Saw vasc surgery recently with wound vac removed. Pt reports tolerating abx without issues and denies problems with PICC. Denies fevers or chills, though states he is seeing some drainage from his prior R groin surgical site.       Interval history:   11/23/22: Since last seen, he states he has been doing ok - noticed more swelling of his R groin. No drainage or pain noted. Tolerating erta without issues. Saw plastics today - attempted to drain fluid collection per patient, unable to aspirate. Reports taking vori, denies adverse reactions.   1/6/23: Doing well since last seen in clinic - briefly admitted in the fall; cx data obtained at that time unrevealing. US of R groin with cystic structure (lymphocele vs seroma) without communication with prior graft. Pt reports tolerating abx/vori without issues. Reports R groin swelling is better, less swollen and with minimal dc. Denies problems with PICC. Reports abdominal swelling when supine; nonpainful. Pt received a letter from insurance company that vori may not be covered.   2/1/23: Recently admitted for R groin swelling after a mechanical fall - noted to have a hematoma, no acute surgical intervention per surgical  team. Pt reports swelling has improved since discharge from the hospital. He had DLES cx obtained during his admission - ngtd. Tolerating voriconazole without issues. No fevers or chills.   1/31/24: Last seen 2/2023, since then he states he has been doing ok. Pt was seen in LVAD clinic 12/2023 - noted to have DLES drainage - cx with Kleb aerogenes and Corynebacterium. Was given a course of doxycycline, completed a couple of weeks ago. Still has some yellow thin drainage, no pain or erythema. No fevers or chills. Pt states that he stopped taking voriconazole about a year ago.       Review of Systems   Constitutional: Negative for chills and fever.   Skin:  Positive for poor wound healing.   All other systems reviewed and are negative.        Past Medical History:   Diagnosis Date    A-fib     Anticoagulant long-term use     Atrial flutter 7/30/2022    CHF (congestive heart failure)     Class 1 obesity due to excess calories with serious comorbidity and body mass index (BMI) of 31.0 to 31.9 in adult     Class 1 obesity due to excess calories with serious comorbidity and body mass index (BMI) of 32.0 to 32.9 in adult     Dilated cardiomyopathy 1/10/2018    Disorder of kidney and ureter     CKD    Encounter for blood transfusion     Essential hypertension 8/28/2022    Gout     HTN (hypertension)     Hx of psychiatric care     ICD (implantable cardioverter-defibrillator) infection 7/1/2020    Psychiatric problem     Thyroid disease     Ventricular tachycardia (paroxysmal)      Past Surgical History:   Procedure Laterality Date    AORTIC VALVULOPLASTY N/A 07/13/2022    Procedure: REPAIR, AORTIC VALVE;  Surgeon: Yg Kaufman MD;  Location: [facility] OR [address] [place] [address];  Service: Cardiovascular;  Laterality: N/A;    APPLICATION OF WOUND VACUUM-ASSISTED CLOSURE DEVICE N/A 07/15/2022    Procedure: APPLICATION, WOUND VAC;  Surgeon: Yg Kaufman MD;  Location: [facility] OR [address] [place] [address];  Service: Cardiovascular;  Laterality: N/A;  50 x 5 cm      APPLICATION OF WOUND VACUUM-ASSISTED CLOSURE DEVICE Right 09/27/2022    Procedure: APPLICATION, WOUND VAC;  Surgeon: Kole Tabares MD;  Location: [facility] OR 2ND FLR;  Service: Plastics;  Laterality: Right;    CARDIAC CATHETERIZATION  12/2012    CARDIAC DEFIBRILLATOR PLACEMENT Left     CRRT-D    CARDIAC VALVE REPLACEMENT  03/08/2018    LVAD Implant    COLONOSCOPY N/A 03/06/2018    Procedure: COLONOSCOPY;  Surgeon: Alonso Bone MD;  Location: [facility] ENDO (2ND FLR);  Service: Endoscopy;  Laterality: N/A;    COLONOSCOPY N/A 07/17/2019    Procedure: COLONOSCOPY;  Surgeon: Blane Valdez MD;  Location: [facility] ENDO (2ND FLR);  Service: Endoscopy;  Laterality: N/A;    COLONOSCOPY N/A 07/18/2019    Procedure: COLONOSCOPY;  Surgeon: Blane Valdez MD;  Location: [facility] ENDO (2ND FLR);  Service: Endoscopy;  Laterality: N/A;    CREATION OF ILIOFEMORAL ARTERY BYPASS Right 09/27/2022    Procedure: CREATION, BYPASS, ARTERIAL, ILIAC TO FEMORAL WITH GRAFT;  Surgeon: Zach Hernández MD;  [facility];  Service: Peripheral Vascular;  Laterality: Right;    CREATION OF MUSCLE ROTATIONAL FLAP Right 09/27/2022    Procedure: CREATION, FLAP, MUSCLE ROTATION, SARTORIUS AND RECTUS FEMORIS;  Surgeon: Kole Tabares MD;  [facility];  Service: Plastics;  Laterality: Right;    ESOPHAGOGASTRODUODENOSCOPY N/A 07/17/2019    Procedure: EGD (ESOPHAGOGASTRODUODENOSCOPY);  Surgeon: Blane Valdez MD;  Location: [facility] ENDO (2ND FLR);  Service: Endoscopy;  Laterality: N/A;    ESOPHAGOGASTRODUODENOSCOPY N/A 07/18/2019    Procedure: EGD (ESOPHAGOGASTRODUODENOSCOPY);  Surgeon: Blane Valdez MD;  Location: [facility] ENDO (2ND FLR);  Service: Endoscopy;  Laterality: N/A;    FOREIGN BODY REMOVAL N/A 07/22/2022    Procedure: REMOVAL, FOREIGN BODY;  Surgeon: Yg Kaufman MD;  Location: [facility] OR [facility] FLR;  Service: Cardiovascular;  Laterality: N/A;  LVAD Heartmate 2 drive line removal    INSERTION OF GRAFT TO PERICARDIUM N/A 07/15/2022     Procedure: INSERTION, GRAFT, PERICARDIUM;  Surgeon: Yg Kaufman MD;  [facility] FLR;  Service: Cardiovascular;  Laterality: N/A;    IRRIGATION OF MEDIASTINUM N/A 07/15/2022    Procedure: IRRIGATION, MEDIASTINUM;  Surgeon: Yg Kaufman MD;  [facility];  Service: Cardiovascular;  Laterality: N/A;    LYSIS OF ADHESIONS  07/13/2022    Procedure: LYSIS, ADHESIONS;  Surgeon: Yg Kaufman MD;  [facility];  Service: Cardiovascular;;    NONINVASIVE CARDIAC ELECTROPHYSIOLOGY STUDY N/A 10/18/2019    Procedure: CARDIAC ELECTROPHYSIOLOGY STUDY, NONINVASIVE;  Surgeon: Raz Wagner MD;  Location: [facility] EP LAB;  Service: Cardiology;  Laterality: N/A;  VT, DFTs, MDT CRTD in situ, LVAD, anes, MB, 3098    REPLACEMENT OF IMPLANTABLE CARDIOVERTER-DEFIBRILLATOR (ICD) GENERATOR N/A 03/09/2020    Procedure: REPLACEMENT, ICD GENERATOR;  Surgeon: Harry Yun MD;  Location: [facility] EP LAB;  Service: Cardiology;  Laterality: N/A;  VT, ICD Gen Change and Lead Revision, MDT, MAC, DM,3 Prep    REPLACEMENT OF LEFT VENTRICULAR ASSIST DEVICE (LVAD)  07/13/2022    Procedure: REPLACEMENT, LVAD;  Surgeon: Yg Kaufman MD;  [facility];  Service: Cardiovascular;;    REPLACEMENT OF PUMP N/A 07/13/2022    Procedure: REPLACEMENT, PUMP;  Surgeon: Yg Kaufman MD;  [facility];  Service: Cardiovascular;  Laterality: N/A;  LVAD pump exchange  EXPLANATION OF HEATMATE 2  IMPLANTATION OF HEARTMATE 3  IMPLANTATION OF 8MM CHIMNEY GRAFT TO RFA  INITIATION OF ECMO  TEMPORARY CLOSURE OF CHEST    REVISION OF IMPLANTABLE CARDIOVERTER-DEFIBRILLATOR (ICD) ELECTRODE LEAD PLACEMENT N/A 03/09/2020    Procedure: REVISION, INSERTION, ELECTRODE LEAD, ICD;  Surgeon: Harry Yun MD;  Location: [facility] EP LAB;  Service: Cardiology;  Laterality: N/A;  VT, ICD Gen Change and Lead Revision, MDT, MAC, DM,3 Prep    RIGHT HEART CATHETERIZATION Right 09/08/2023    Procedure: INSERTION, CATHETER,  RIGHT HEART;  Surgeon: Gaurav Rodriguez MD;  Location: [facility] CATH LAB;  Service: Cardiology;  Laterality: Right;    STERNAL WOUND CLOSURE N/A 07/14/2022    Procedure: CLOSURE, WOUND, STERNUM;  Surgeon: Yg Kaufman MD;  Location: [facility] OR [facility] FLR;  Service: Cardiovascular;  Laterality: N/A;  temporary closure  evacuation of hematoma    STERNAL WOUND CLOSURE N/A 07/15/2022    Procedure: CLOSURE, WOUND, STERNUM;  Surgeon: Yg Kaufman MD;  Location: [facility] OR [facility] FLR;  Service: Cardiovascular;  Laterality: N/A;    TRACHEOSTOMY N/A 08/04/2022    Procedure: CREATION, TRACHEOSTOMY;  Surgeon: Germain Holt MD;  [facility];  Service: General;  Laterality: N/A;    TREATMENT OF CARDIAC ARRHYTHMIA  10/18/2019    Procedure: Cardioversion or Defibrillation;  Surgeon: Raz Wagner MD;  Location: [facility] EP LAB;  Service: Cardiology;;     Family History   Problem Relation Age of Onset    Hypertension Father     Diabetes Father     Coronary artery disease Father     Heart disease Father         CHF    No Known Problems Mother     Cancer Sister 54        breast CA    No Known Problems Brother     Anxiety disorder Neg Hx     Depression Neg Hx     Dementia Neg Hx     Bipolar disorder Neg Hx     Suicide Neg Hx      Social History     Tobacco Use    Smoking status: Former     Types: Vaping w/o nicotine    Smokeless tobacco: Never    Tobacco comments:     pt is quiting on his own - pt stated not qualified for program; 2/16 pt  quit on his own   Substance Use Topics    Alcohol use: Not Currently     Comment: quit    Drug use: Never       Review of patient's allergies indicates:   Allergen Reactions    Lisinopril Anaphylaxis    Hydralazine     Hydralazine analogues      Chronic constipation, impotence, dizziness         Objective:   VS (24h):   There were no vitals filed for this visit.          Physical Exam  Constitutional:       General: He is not in acute distress.     Appearance: He is not ill-appearing or  toxic-appearing.   HENT:      Head: Normocephalic and atraumatic.      Right Ear: External ear normal.      Left Ear: External ear normal.   Pulmonary:      Effort: Pulmonary effort is normal. No respiratory distress.   Abdominal:      General: There is no distension.      Palpations: Abdomen is soft.      Tenderness: There is no abdominal tenderness.      Comments: LVAD site dressed, clean and dry; contralateral prior LVAD site well healed   Skin:     General: Skin is warm and dry.      Coloration: Skin is not jaundiced.      Findings: No erythema or lesion.   Neurological:      Mental Status: He is alert and oriented to person, place, and time. Mental status is at baseline.      Motor: No weakness.   Psychiatric:         Mood and Affect: Mood normal.         Behavior: Behavior normal.           Immunization History   Administered Date(s) Administered    COVID-19, MRNA, LN-S, PF (MODERNA FULL 0.5 ML DOSE) 03/12/2021, 04/09/2021    COVID-19, MRNA, LN-S, PF (Pfizer) (Purple Cap) 12/04/2021    Hepatitis A / Hepatitis B 02/23/2018    Influenza 11/01/2014, 08/17/2019    Influenza - Quadrivalent - PF *Preferred* (6 months and older) 11/01/2014, 09/23/2015, 09/21/2016, 10/05/2017, 03/05/2021    Influenza - Trivalent - PF (PED) 11/01/2014    Pneumococcal Conjugate - 13 Valent 11/01/2014    Pneumococcal Polysaccharide - 23 Valent 10/01/2015, 03/24/2016    Tdap 02/23/2018         Assessment:     1. 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  Dispense: 74 tablet; Refill: 0             58 y.o. 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 He was seen in VAD clinic 12/7/23, with DLES cx with Kaerogenes and Cstriatum; s/p 30d of doxycycline. Pt reported some continued drainage. He denied fevers or chills. Tolerated doxy without issues. Pt desired to not take further antifungal medications at this time - reports prior R groin wound has healed completely and pt preferred to monitor off voriconazole. Discussed risks associated with treatment vs monitoring.       Plan:     -extend doxycycline until next LVAD visit ~3/8   -recommend re-culture if drainage persists, +/- CT scan   -will cc VAD coordinator  -advised pt that if he has more drainage, fevers, chills, etc, to seek medical attention              Follow up in 3 mo or sooner, WB preferable per pt - VV    Management of infected pseudoaneurysm was discussed with patient. Patient was given ample time for questions, all questions answered. Strict return precautions given to patient.         41 minutes of total time spent on the encounter, which includes face to face time and non-face to face time preparing to see the patient (eg, review of tests), obtaining and/or reviewing separately obtained history, documenting clinical information in the electronic or other health record, independently interpreting results (not separately reported) and communicating results to the patient/family/caregiver, or care coordination (not separately reported).            Jessica Theppote  MD  Infectious Disease

## 2024-01-31 NOTE — LETTER
January 31, 2024        Desirae Alexander RN             [facility] - Infectious Diseases  [address]  [address]  [place] [address]  Phone: [phone]  Fax: [phone]   Patient: Tim Richards   MR Number: [id number]   YOB: 1965   Date of Visit: 1/31/2024       Dear Dr. Alexander:    Thank you for referring Tim Richards to me for evaluation. Below are the relevant portions of my assessment and plan of care.            If you have questions, please do not hesitate to call me. I look forward to following Tim along with you.    Sincerely,      Theppote, Jessica GARCIA MD           CC    No Recipients

## 2024-01-31 NOTE — PROGRESS NOTES
[facility]    2024 3:53 PM    Assessment/Plan:    Patient presents today with therapeutic INR.    Assessment of patient findings and chart review: no significant findings     Recommendation for patient's warfarin regimen: Continue current maintenance dose    Recommend repeat INR in 1 week  _________________________________________________________________    Tim Richards (58 y.o.) is followed by the [facility].    Anticoagulation Summary  As of 2024      INR goal:  2.0-3.0   TTR:  70.7 % (5.3 y)   INR used for dosin.8 (2024)   Warfarin maintenance plan:  7.5 mg (5 mg x 1.5) every Thu; 5 mg (5 mg x 1) all other days   Weekly warfarin total:  37.5 mg   Plan last modified:  Radha Eugene, PharmD (10/18/2023)   Next INR check:  2024   Target end date:      Indications    LVAD (left ventricular assist device) present (Resolved) [Z95.811]  Anticoagulation monitoring  INR range 2-3 (Resolved) [Z79.01]                 Anticoagulation Episode Summary       INR check location:  Clinic Lab    Preferred lab:      Send INR reminders to:  [facility] COUMADIN LVAD    Comments:  LVAD// [facility]//Lab [place] Results: [phone] [facility]# [id number] Phone: [phone] [phone]//dc [facility] [phone], fax [phone] //pt declined meter, see 10/11/22 & 22 notes// Bridge:NO (h/o bleeds)          Anticoagulation Care Providers       Provider Role Specialty Phone number    Antonio Hadley Jr., MD Responsible Cardiology [phone]

## 2024-02-08 ENCOUNTER — LAB VISIT (OUTPATIENT)
Dept: LAB | Facility: HOSPITAL | Age: 59
End: 2024-02-08
Attending: INTERNAL MEDICINE
Payer: MEDICARE

## 2024-02-08 ENCOUNTER — ANTI-COAG VISIT (OUTPATIENT)
Dept: CARDIOLOGY | Facility: CLINIC | Age: 59
End: 2024-02-08
Payer: MEDICARE

## 2024-02-08 DIAGNOSIS — Z79.01 LONG TERM (CURRENT) USE OF ANTICOAGULANTS: ICD-10-CM

## 2024-02-08 DIAGNOSIS — Z95.811 LVAD (LEFT VENTRICULAR ASSIST DEVICE) PRESENT: ICD-10-CM

## 2024-02-08 LAB
INR PPP: 4.1 (ref 0.8–1.2)
PROTHROMBIN TIME: 40.8 SEC (ref 9–12.5)

## 2024-02-08 PROCEDURE — 85610 PROTHROMBIN TIME: CPT | Performed by: INTERNAL MEDICINE

## 2024-02-08 PROCEDURE — 93793 ANTICOAG MGMT PT WARFARIN: CPT | Mod: S$GLB,,,

## 2024-02-08 PROCEDURE — 36415 COLL VENOUS BLD VENIPUNCTURE: CPT | Performed by: INTERNAL MEDICINE

## 2024-02-08 NOTE — PROGRESS NOTES
02/08/2024-Spoke to pt regarding 2/7/24 missed INR. Pt stated he forgot INR date and will go for INR today.

## 2024-02-08 NOTE — PROGRESS NOTES
[facility]    2024 4:03 PM    Assessment/Plan:    Patient presents today with supratherapeutic INR.    Assessment of patient findings and chart review: Reports recent alcohol intake    Recommendation for patient's warfarin regimen: Hold dose today then resume current maintenance dose    Recommend repeat INR Monday  _________________________________________________________________    Tim Richards (58 y.o.) is followed by the [facility].    Anticoagulation Summary  As of 2024      INR goal:  2.0-3.0   TTR:  70.5 % (5.3 y)   INR used for dosin.1 (2024)   Warfarin maintenance plan:  7.5 mg (5 mg x 1.5) every Thu; 5 mg (5 mg x 1) all other days   Weekly warfarin total:  37.5 mg   Plan last modified:  Radha Eugene, PharmD (10/18/2023)   Next INR check:  2024   Target end date:      Indications    LVAD (left ventricular assist device) present (Resolved) [Z95.811]  Anticoagulation monitoring  INR range 2-3 (Resolved) [Z79.01]                 Anticoagulation Episode Summary       INR check location:  Clinic Lab    Preferred lab:      Send INR reminders to:  [facility] COUMADIN LVAD    Comments:  LVAD// [facility]//Lab [place] Results: [phone] [facility]# [id number] Phone: [phone]//dc 23 [place] [phone], fax [phone] //pt declined meter, see 10/11/22 & 22 notes// Bridge:NO (h/o bleeds)          Anticoagulation Care Providers       Provider Role Specialty Phone number    Antonio Hadley Jr., MD [facility] Cardiology [phone]

## 2024-02-08 NOTE — PROGRESS NOTES
Patient reports correct Warfarin dose, salad and gary 2/7/2024, cranberry juice 4-5 times this week, no other changes reported.

## 2024-02-09 ENCOUNTER — TELEPHONE (OUTPATIENT)
Dept: TRANSPLANT | Facility: CLINIC | Age: 59
End: 2024-02-09
Payer: MEDICARE

## 2024-02-09 NOTE — TELEPHONE ENCOUNTER
Phone call received from patient's insurance for [employer] PA. I answered questions and was told that PA would be approved and we would receive a fax of approval.

## 2024-02-14 ENCOUNTER — ANTI-COAG VISIT (OUTPATIENT)
Dept: CARDIOLOGY | Facility: CLINIC | Age: 59
End: 2024-02-14
Payer: MEDICARE

## 2024-02-14 ENCOUNTER — LAB VISIT (OUTPATIENT)
Dept: LAB | Facility: HOSPITAL | Age: 59
End: 2024-02-14
Attending: INTERNAL MEDICINE
Payer: MEDICARE

## 2024-02-14 DIAGNOSIS — Z79.01 LONG TERM (CURRENT) USE OF ANTICOAGULANTS: ICD-10-CM

## 2024-02-14 DIAGNOSIS — Z95.811 LVAD (LEFT VENTRICULAR ASSIST DEVICE) PRESENT: ICD-10-CM

## 2024-02-14 LAB
INR PPP: 1.7 (ref 0.8–1.2)
PROTHROMBIN TIME: 18.3 SEC (ref 9–12.5)

## 2024-02-14 PROCEDURE — 93793 ANTICOAG MGMT PT WARFARIN: CPT | Mod: S$GLB,,,

## 2024-02-14 PROCEDURE — 85610 PROTHROMBIN TIME: CPT | Performed by: INTERNAL MEDICINE

## 2024-02-14 PROCEDURE — 36415 COLL VENOUS BLD VENIPUNCTURE: CPT | Performed by: INTERNAL MEDICINE

## 2024-02-14 NOTE — PROGRESS NOTES
Pt reports missing 2 doses of Warfarin and unsure of the days, took correct doses all other days, decreased appetite, no other changes reported.

## 2024-02-14 NOTE — PROGRESS NOTES
[facility]    2024 4:03 PM    Assessment/Plan:    Patient presents today with subtherapeutic  INR.    Assessment of patient findings and chart review: Missed two doses    Recommendation for patient's warfarin regimen: Boost dose today to 7.5mg then resume current maintenance dose    Recommend repeat INR Monday  _________________________________________________________________    Tim Richards (58 y.o.) is followed by the [facility].    Anticoagulation Summary  As of 2024      INR goal:  2.0-3.0   TTR:  70.4 % (5.3 y)   INR used for dosin.7 (2024)   Warfarin maintenance plan:  7.5 mg (5 mg x 1.5) every Thu; 5 mg (5 mg x 1) all other days   Weekly warfarin total:  37.5 mg   Plan last modified:  Radha Eugene, PharmD (10/18/2023)   Next INR check:  2024   Target end date:      Indications    LVAD (left ventricular assist device) present (Resolved) [Z95.811]  Anticoagulation monitoring  INR range 2-3 (Resolved) [Z79.01]                 Anticoagulation Episode Summary       INR check location:  Clinic Lab    Preferred lab:      Send INR reminders to:  [facility] COUMADIN LVAD    Comments:  LVAD// [facility]//Lab [place] Results: [phone] [facility]# [id number] Phone: [phone]//dc 23 [facility] [phone], fax [phone] //pt declined meter, see 10/11/22 & 22 notes// Bridge:NO (h/o bleeds)          Anticoagulation Care Providers       Provider Role Specialty Phone number    Antonio Hadley Jr., MD [facility] Cardiology [phone]

## 2024-02-19 ENCOUNTER — LAB VISIT (OUTPATIENT)
Dept: LAB | Facility: HOSPITAL | Age: 59
End: 2024-02-19
Attending: INTERNAL MEDICINE
Payer: MEDICARE

## 2024-02-19 ENCOUNTER — ANTI-COAG VISIT (OUTPATIENT)
Dept: CARDIOLOGY | Facility: CLINIC | Age: 59
End: 2024-02-19
Payer: MEDICARE

## 2024-02-19 DIAGNOSIS — Z79.01 LONG TERM (CURRENT) USE OF ANTICOAGULANTS: ICD-10-CM

## 2024-02-19 DIAGNOSIS — Z95.811 LVAD (LEFT VENTRICULAR ASSIST DEVICE) PRESENT: ICD-10-CM

## 2024-02-19 LAB
INR PPP: 2 (ref 0.8–1.2)
PROTHROMBIN TIME: 21 SEC (ref 9–12.5)

## 2024-02-19 PROCEDURE — 93793 ANTICOAG MGMT PT WARFARIN: CPT | Mod: S$GLB,,,

## 2024-02-19 PROCEDURE — 85610 PROTHROMBIN TIME: CPT | Performed by: INTERNAL MEDICINE

## 2024-02-19 PROCEDURE — 36415 COLL VENOUS BLD VENIPUNCTURE: CPT | Performed by: INTERNAL MEDICINE

## 2024-02-19 NOTE — PROGRESS NOTES
[facility]    2024 2:55 PM    Assessment/Plan:    Patient presents today with therapeutic INR.    Assessment of patient findings and chart review: no significant findings     Recommendation for patient's warfarin regimen: Continue current maintenance dose    Recommend repeat INR in 1 week  _________________________________________________________________    Tim Richards (58 y.o.) is followed by the [facility].    Anticoagulation Summary  As of 2024      INR goal:  2.0-3.0   TTR:  70.2 % (5.4 y)   INR used for dosin.0 (2024)   Warfarin maintenance plan:  7.5 mg (5 mg x 1.5) every Thu; 5 mg (5 mg x 1) all other days   Weekly warfarin total:  37.5 mg   Plan last modified:  Radha Eugene, PharmD (10/18/2023)   Next INR check:  2024   Target end date:      Indications    LVAD (left ventricular assist device) present (Resolved) [Z95.811]  Anticoagulation monitoring  INR range 2-3 (Resolved) [Z79.01]                 Anticoagulation Episode Summary       INR check location:  Clinic Lab    Preferred lab:      [facility]  [facility]    Comments:  LVAD// [facility] Lab//Lab [place] Results: [phone] [facility]# [id number] Phone: [phone]//dc 23 [facility] [phone], fax [phone] //pt declined meter, see 10/11/22 & 22 notes// Bridge:NO (h/o bleeds)          Anticoagulation Care Providers       Provider Role Specialty Phone number    Antonio Hadley Jr., MD Responsible Cardiology [phone]

## 2024-02-23 ENCOUNTER — ANTI-COAG VISIT (OUTPATIENT)
Dept: CARDIOLOGY | Facility: CLINIC | Age: 59
End: 2024-02-23
Payer: MEDICARE

## 2024-02-23 ENCOUNTER — LAB VISIT (OUTPATIENT)
Dept: LAB | Facility: HOSPITAL | Age: 59
End: 2024-02-23
Attending: INTERNAL MEDICINE
Payer: MEDICARE

## 2024-02-23 DIAGNOSIS — Z95.811 LVAD (LEFT VENTRICULAR ASSIST DEVICE) PRESENT: ICD-10-CM

## 2024-02-23 DIAGNOSIS — Z79.01 LONG TERM (CURRENT) USE OF ANTICOAGULANTS: ICD-10-CM

## 2024-02-23 LAB
INR PPP: 2.9 (ref 0.8–1.2)
PROTHROMBIN TIME: 29.8 SEC (ref 9–12.5)

## 2024-02-23 PROCEDURE — 93793 ANTICOAG MGMT PT WARFARIN: CPT | Mod: S$GLB,,,

## 2024-02-23 PROCEDURE — 85610 PROTHROMBIN TIME: CPT | Performed by: INTERNAL MEDICINE

## 2024-02-23 PROCEDURE — 36415 COLL VENOUS BLD VENIPUNCTURE: CPT | Performed by: INTERNAL MEDICINE

## 2024-02-23 NOTE — PROGRESS NOTES
[facility]    2024 4:02 PM    Assessment/Plan:    Patient presents today with therapeutic INR.    Assessment of patient findings and chart review: no significant findings     Recommendation for patient's warfarin regimen: Continue current maintenance dose    Recommend repeat INR Wednesday  _________________________________________________________________    Tim Richards (58 y.o.) is followed by the [facility].    Anticoagulation Summary  As of 2024      INR goal:  2.0-3.0   TTR:  70.3 % (5.4 y)   INR used for dosin.9 (2024)   Warfarin maintenance plan:  7.5 mg (5 mg x 1.5) every Thu; 5 mg (5 mg x 1) all other days   Weekly warfarin total:  37.5 mg   Plan last modified:  Radha Eugene, PharmD (10/18/2023)   Next INR check:  2024   Target end date:      Indications    LVAD (left ventricular assist device) present (Resolved) [Z95.811]  Anticoagulation monitoring  INR range 2-3 (Resolved) [Z79.01]                 Anticoagulation Episode Summary       INR check location:  Clinic Lab    Preferred lab:      Send INR reminders to:  [facility] COUMADIN LVAD    Comments:  LVAD// [facility] Lab//Lab Carm[place] Results: [phone] [facility]# [id number] Phone: [phone]//dc 23 [facility] [phone], fax [phone] //pt declined meter, see 10/11/22 & 22 notes// Bridge:NO (h/o bleeds)          Anticoagulation Care Providers       Provider Role Specialty Phone number    Antonio Hadley Jr., MD Responsible Cardiology [phone]

## 2024-02-23 NOTE — PROGRESS NOTES
02/23/2024-Spoke to pt regarding 2/22/24 missed INR. Pt stated he will go for INR today at [facility] at 1pm.

## 2024-02-27 NOTE — SUBJECTIVE & OBJECTIVE
Interval History:  today    Continuous Infusions:  Scheduled Meds:   allopurinoL  100 mg Oral Daily    amiodarone  400 mg Oral Daily    amLODIPine  10 mg Oral Daily    aspirin  81 mg Oral Daily    doxazosin  8 mg Oral Q12H    ferrous gluconate  324 mg Oral Daily with breakfast    fluticasone propionate  2 spray Each Nostril Daily    methIMAzole  10 mg Oral Daily    methIMAzole  10 mg Oral Daily    pantoprazole  40 mg Oral Daily    predniSONE  30 mg Oral Daily    spironolactone  25 mg Oral Daily    warfarin  5 mg Oral Daily     PRN Meds:zolpidem    Review of patient's allergies indicates:   Allergen Reactions    Lisinopril Anaphylaxis    Hydralazine analogues      Chronic constipation, impotence, dizziness     Objective:     Vital Signs (Most Recent):  Temp: 97.2 °F (36.2 °C) (04/29/20 1122)  Pulse: (!) 120 (04/29/20 1122)  Resp: 18 (04/29/20 1122)  BP: 121/66 (04/29/20 1122)  SpO2: (!) 93 % (04/29/20 0859) Vital Signs (24h Range):  Temp:  [97.1 °F (36.2 °C)-98.4 °F (36.9 °C)] 97.2 °F (36.2 °C)  Pulse:  [] 120  Resp:  [18] 18  SpO2:  [92 %-98 %] 93 %  BP: ()/(0-66) 121/66     Patient Vitals for the past 72 hrs (Last 3 readings):   Weight   04/29/20 0702 123.9 kg (273 lb 2.4 oz)   04/28/20 1056 123.4 kg (272 lb)   04/28/20 0412 123.4 kg (272 lb 0.8 oz)     Body mass index is 36.04 kg/m².      Intake/Output Summary (Last 24 hours) at 4/29/2020 1157  Last data filed at 4/29/2020 0500  Gross per 24 hour   Intake 480 ml   Output 1250 ml   Net -770 ml       Hemodynamic Parameters:           Physical Exam   Constitutional: He is oriented to person, place, and time. He appears well-developed and well-nourished. No distress.   HENT:   Head: Normocephalic.   Left Ear: External ear normal.   Eyes: Pupils are equal, round, and reactive to light. Right eye exhibits no discharge. Left eye exhibits no discharge.   Neck: Normal range of motion. No thyromegaly present.   Cardiovascular: Normal rate  and regular rhythm. Exam reveals no friction rub.   No murmur heard.  Pulmonary/Chest: Effort normal and breath sounds normal. No respiratory distress.   Abdominal: Soft. Bowel sounds are normal. He exhibits no distension. There is no tenderness.   Musculoskeletal: Normal range of motion. He exhibits no edema.   Neurological: He is alert and oriented to person, place, and time. No cranial nerve deficit.   Skin:        Skin scar for BiV ICD   VAD in place        Significant Labs:  CBC:  Recent Labs   Lab 04/27/20 1800 04/28/20 0442 04/29/20  0545   WBC 10.55 9.20 8.33   RBC 5.21 4.61 4.71   HGB 13.9* 12.5* 12.7*   HCT 45.9 40.8 42.1    156 156   MCV 88 89 89   MCH 26.7* 27.1 27.0   MCHC 30.3* 30.6* 30.2*     BNP:  Recent Labs   Lab 04/23/20 1205 04/27/20 1800 04/29/20  0544   * 241* 229*     CMP:  Recent Labs   Lab 04/23/20 1205 04/27/20 1800 04/28/20 0442 04/29/20  0544 04/29/20  0545   * 132* 82  --  98   CALCIUM 9.6 9.7 9.2  --  9.2   ALBUMIN 3.6 3.6  --  3.1*  --    PROT 7.2 7.2  --  6.4  --     136 133*  --  137   K 4.3 4.4 3.6  --  4.1   CO2 25 24 26  --  27   CL 98 97 97  --  101   BUN 22* 23* 22*  --  18   CREATININE 1.9* 1.9* 1.5*  --  1.5*   ALKPHOS 117 96  --  83  --    ALT 45* 42  --  36  --    AST 27 31  --  26  --    BILITOT 0.5 0.5  --  0.4  --       Coagulation:   Recent Labs   Lab 04/27/20 1800 04/27/20 2027 04/28/20 0442 04/29/20  0545   INR  --  3.1* 2.8* 2.2*   APTT 38.3*  --  35.1* 33.2*     LDH:  Recent Labs   Lab 04/27/20 1225 04/28/20  0442 04/29/20  0545   * 666* 687*     Microbiology:  Microbiology Results (last 7 days)     ** No results found for the last 168 hours. **          I have reviewed all pertinent labs within the past 24 hours.    Estimated Creatinine Clearance: 78.5 mL/min (A) (based on SCr of 1.5 mg/dL (H)).    Diagnostic Results:  I have reviewed and interpreted all pertinent imaging results/findings within the past 24 hours.   [Negative] : Heme/Lymph

## 2024-02-29 ENCOUNTER — LAB VISIT (OUTPATIENT)
Dept: LAB | Facility: HOSPITAL | Age: 59
End: 2024-02-29
Attending: INTERNAL MEDICINE
Payer: MEDICARE

## 2024-02-29 ENCOUNTER — ANTI-COAG VISIT (OUTPATIENT)
Dept: CARDIOLOGY | Facility: CLINIC | Age: 59
End: 2024-02-29
Payer: MEDICARE

## 2024-02-29 DIAGNOSIS — Z95.811 LVAD (LEFT VENTRICULAR ASSIST DEVICE) PRESENT: ICD-10-CM

## 2024-02-29 DIAGNOSIS — Z79.01 LONG TERM (CURRENT) USE OF ANTICOAGULANTS: ICD-10-CM

## 2024-02-29 LAB
INR PPP: 2.2 (ref 0.8–1.2)
PROTHROMBIN TIME: 22.6 SEC (ref 9–12.5)

## 2024-02-29 PROCEDURE — 36415 COLL VENOUS BLD VENIPUNCTURE: CPT | Performed by: INTERNAL MEDICINE

## 2024-02-29 PROCEDURE — 93793 ANTICOAG MGMT PT WARFARIN: CPT | Mod: S$GLB,,,

## 2024-02-29 PROCEDURE — 85610 PROTHROMBIN TIME: CPT | Performed by: INTERNAL MEDICINE

## 2024-02-29 NOTE — PROGRESS NOTES
[facility]    2024 3:55 PM    Assessment/Plan:    Patient presents today with therapeutic INR.    Assessment of patient findings and chart review: no significant findings     Recommendation for patient's warfarin regimen: Continue current maintenance dose    Recommend repeat INR in 1 week  _________________________________________________________________    Tim Richards (58 y.o.) is followed by the [facility].    Anticoagulation Summary  As of 2024      INR goal:  2.0-3.0   TTR:  70.4 % (5.4 y)   INR used for dosin.2 (2024)   Warfarin maintenance plan:  7.5 mg (5 mg x 1.5) every Thu; 5 mg (5 mg x 1) all other days   Weekly warfarin total:  37.5 mg   Plan last modified:  Radha Eugene, PharmD (10/18/2023)   Next INR check:  3/7/2024   Target end date:      Indications    LVAD (left ventricular assist device) present (Resolved) [Z95.811]  Anticoagulation monitoring  INR range 2-3 (Resolved) [Z79.01]                 Anticoagulation Episode Summary       INR check location:  Clinic Lab    Preferred lab:      Send INR reminders to:  [facility] COUMADIN LVAD    Comments:  LVAD// [facility]//Lab [place] Results: [phone] [facility]# [id number] Phone: [phone]//dc 23 [facility] [phone], fax [phone] //pt declined meter, see 10/11/22 & 22 notes// Bridge:NO (h/o bleeds)          Anticoagulation Care Providers       Provider Role Specialty Phone number    Antonio Hadley Jr., MD Responsible Cardiology [phone]

## 2024-03-04 NOTE — TELEPHONE ENCOUNTER
Unable to retrieve patient chart and identify care due.  [facility] Embedded Care Due Messages. Reference number: [id number].   3/04/2024 5:28:03 PM CST

## 2024-03-04 NOTE — TELEPHONE ENCOUNTER
Unable to retrieve patient chart and identify care due.  [facility] Embedded Care Due Messages. Reference number: [id number].   3/04/2024 5:24:14 PM CST

## 2024-03-05 RX ORDER — ZOLPIDEM TARTRATE 12.5 MG/1
12.5 TABLET, FILM COATED, EXTENDED RELEASE ORAL NIGHTLY PRN
Qty: 30 TABLET | Refills: 2 | Status: SHIPPED | OUTPATIENT
Start: 2024-03-05 | End: 2024-03-20 | Stop reason: SDUPTHER

## 2024-03-07 ENCOUNTER — HOSPITAL ENCOUNTER (OUTPATIENT)
Dept: CARDIOLOGY | Facility: HOSPITAL | Age: 59
Discharge: HOME | End: 2024-03-07
Attending: INTERNAL MEDICINE
Payer: MEDICARE

## 2024-03-07 ENCOUNTER — HOSPITAL ENCOUNTER (OUTPATIENT)
Dept: PULMONOLOGY | Facility: CLINIC | Age: 59
Discharge: HOME | End: 2024-03-07
Payer: MEDICARE

## 2024-03-07 ENCOUNTER — ANTI-COAG VISIT (OUTPATIENT)
Dept: CARDIOLOGY | Facility: CLINIC | Age: 59
End: 2024-03-07
Payer: MEDICARE

## 2024-03-07 ENCOUNTER — CLINICAL SUPPORT (OUTPATIENT)
Dept: TRANSPLANT | Facility: CLINIC | Age: 59
End: 2024-03-07
Payer: MEDICARE

## 2024-03-07 ENCOUNTER — OFFICE VISIT (OUTPATIENT)
Dept: TRANSPLANT | Facility: CLINIC | Age: 59
End: 2024-03-07
Payer: MEDICARE

## 2024-03-07 VITALS — SYSTOLIC BLOOD PRESSURE: 84 MMHG | BODY MASS INDEX: 31.28 KG/M2 | TEMPERATURE: 99 F | WEIGHT: 236 LBS | HEIGHT: 73 IN

## 2024-03-07 VITALS — HEIGHT: 73 IN | WEIGHT: 238 LBS | HEART RATE: 64 BPM | BODY MASS INDEX: 31.54 KG/M2

## 2024-03-07 VITALS — HEIGHT: 73 IN | WEIGHT: 238 LBS | BODY MASS INDEX: 31.54 KG/M2

## 2024-03-07 DIAGNOSIS — Z95.811 HEART REPLACED BY HEART ASSIST DEVICE: ICD-10-CM

## 2024-03-07 DIAGNOSIS — I50.42 CHRONIC COMBINED SYSTOLIC AND DIASTOLIC CONGESTIVE HEART FAILURE: ICD-10-CM

## 2024-03-07 DIAGNOSIS — Z95.811 HEART REPLACED BY HEART ASSIST DEVICE: Primary | ICD-10-CM

## 2024-03-07 DIAGNOSIS — I47.29 PVT (PAROXYSMAL VENTRICULAR TACHYCARDIA): ICD-10-CM

## 2024-03-07 DIAGNOSIS — I42.0 DILATED CARDIOMYOPATHY: ICD-10-CM

## 2024-03-07 DIAGNOSIS — I10 PRIMARY HYPERTENSION: Chronic | ICD-10-CM

## 2024-03-07 DIAGNOSIS — N18.32 STAGE 3B CHRONIC KIDNEY DISEASE: ICD-10-CM

## 2024-03-07 DIAGNOSIS — Z86.79 HISTORY OF VENTRICULAR TACHYCARDIA: ICD-10-CM

## 2024-03-07 DIAGNOSIS — I73.9 PVD (PERIPHERAL VASCULAR DISEASE): ICD-10-CM

## 2024-03-07 DIAGNOSIS — Z95.811 LVAD (LEFT VENTRICULAR ASSIST DEVICE) PRESENT: Primary | Chronic | ICD-10-CM

## 2024-03-07 DIAGNOSIS — Z95.811 LVAD (LEFT VENTRICULAR ASSIST DEVICE) PRESENT: ICD-10-CM

## 2024-03-07 DIAGNOSIS — G47.33 OSA (OBSTRUCTIVE SLEEP APNEA): Chronic | ICD-10-CM

## 2024-03-07 LAB
ASCENDING AORTA: 3.03 CM
BSA FOR ECHO PROCEDURE: 2.36 M2
CV ECHO LV RWT: 0.2 CM
DOP CALC LVOT AREA: 4.5 CM2
DOP CALC LVOT DIAMETER: 2.4 CM
E/E' RATIO: 8.36 M/S
ECHO LV POSTERIOR WALL: 0.75 CM (ref 0.6–1.1)
FRACTIONAL SHORTENING: 1 % (ref 28–44)
INTERVENTRICULAR SEPTUM: 0.96 CM (ref 0.6–1.1)
LA MAJOR: 6.51 CM
LA MINOR: 6.5 CM
LA WIDTH: 4.02 CM
LEFT ATRIUM SIZE: 4 CM
LEFT ATRIUM VOLUME INDEX MOD: 42.4 ML/M2
LEFT ATRIUM VOLUME INDEX: 38.3 ML/M2
LEFT ATRIUM VOLUME MOD: 98.26 CM3
LEFT ATRIUM VOLUME: 88.91 CM3
LEFT INTERNAL DIMENSION IN SYSTOLE: 7.33 CM (ref 2.1–4)
LEFT VENTRICLE DIASTOLIC VOLUME INDEX: 128.51 ML/M2
LEFT VENTRICLE DIASTOLIC VOLUME: 298.14 ML
LEFT VENTRICLE MASS INDEX: 126 G/M2
LEFT VENTRICLE SYSTOLIC VOLUME INDEX: 122.1 ML/M2
LEFT VENTRICLE SYSTOLIC VOLUME: 283.24 ML
LEFT VENTRICULAR INTERNAL DIMENSION IN DIASTOLE: 7.4 CM (ref 3.5–6)
LEFT VENTRICULAR MASS: 292.49 G
LV LATERAL E/E' RATIO: 7.67 M/S
LV SEPTAL E/E' RATIO: 9.2 M/S
LVAD BASE RATE: 6300 RPM
MV PEAK E VEL: 0.46 M/S
PISA TR MAX VEL: 2.25 M/S
RA MAJOR: 5.88 CM
RA PRESSURE ESTIMATED: 3 MMHG
RA WIDTH: 4.65 CM
RIGHT VENTRICULAR END-DIASTOLIC DIMENSION: 5.11 CM
RV TB RVSP: 5 MMHG
SINUS: 2.93 CM
STJ: 2.74 CM
TDI LATERAL: 0.06 M/S
TDI SEPTAL: 0.05 M/S
TDI: 0.06 M/S
TR MAX PG: 20 MMHG
TRICUSPID ANNULAR PLANE SYSTOLIC EXCURSION: 0.95 CM
TV REST PULMONARY ARTERY PRESSURE: 23 MMHG
Z-SCORE OF LEFT VENTRICULAR DIMENSION IN END DIASTOLE: -2.24
Z-SCORE OF LEFT VENTRICULAR DIMENSION IN END SYSTOLE: 2.16

## 2024-03-07 PROCEDURE — 93306 TTE W/DOPPLER COMPLETE: CPT

## 2024-03-07 PROCEDURE — 1160F RVW MEDS BY RX/DR IN RCRD: CPT | Mod: CPTII,S$GLB,, | Performed by: INTERNAL MEDICINE

## 2024-03-07 PROCEDURE — 93750 INTERROGATION VAD IN PERSON: CPT | Mod: S$GLB,,, | Performed by: INTERNAL MEDICINE

## 2024-03-07 PROCEDURE — 94618 PULMONARY STRESS TESTING: CPT | Mod: S$GLB,,, | Performed by: INTERNAL MEDICINE

## 2024-03-07 PROCEDURE — 99215 OFFICE O/P EST HI 40 MIN: CPT | Mod: S$GLB,,, | Performed by: INTERNAL MEDICINE

## 2024-03-07 PROCEDURE — 93306 TTE W/DOPPLER COMPLETE: CPT | Mod: 26,,, | Performed by: INTERNAL MEDICINE

## 2024-03-07 PROCEDURE — 3008F BODY MASS INDEX DOCD: CPT | Mod: CPTII,S$GLB,, | Performed by: INTERNAL MEDICINE

## 2024-03-07 PROCEDURE — 99999 PR PBB SHADOW E&M-EST. PATIENT-LVL III: CPT | Mod: PBBFAC,,, | Performed by: INTERNAL MEDICINE

## 2024-03-07 PROCEDURE — 1159F MED LIST DOCD IN RCRD: CPT | Mod: CPTII,S$GLB,, | Performed by: INTERNAL MEDICINE

## 2024-03-07 RX ORDER — TORSEMIDE 20 MG/1
TABLET ORAL
Qty: 30 TABLET | Refills: 11 | Status: SHIPPED | OUTPATIENT
Start: 2024-03-07

## 2024-03-07 NOTE — LETTER
March 7, 2024        Nader Chiu  [address] [place], [place] [address]  [address]  [place] [address]  Phone: [phone]  Fax: [phone]     Nader Chiu  [address] [place] [address]  Phone: [phone]  Fax: [phone]             [facility]  [address]  [place] [address]  Phone: [phone]   Patient: Tim Richards   MR Number: [id number]   YOB: 1965   Date of Visit: 3/7/2024       Dear Dr. Nader Chiu, Nader Chiu    Thank you for referring Tim Richards to me for evaluation. Attached you will find relevant portions of my assessment and plan of care.    If you have questions, please do not hesitate to call me. I look forward to following Tim Richards along with you.    Sincerely,    Guerda Bautista MD    Enclosure    If you would like to receive this communication electronically, please contact [email or web address] or [phone] to request [employer] access.    [employer] is a tool which provides read-only access to select patient information with whom you have a relationship. Its easy to use and provides real time access to review your patients record including encounter summaries, notes, results, and demographic information.    If you feel you have received this communication in error or would no longer like to receive these types of communications, please e-mail [email or web address]

## 2024-03-07 NOTE — PROCEDURES
Tim Richards is a 58 y.o.  male patient, who presents for a 6 minute walk test ordered by MD Shae.  The diagnosis is Left Ventricular Assist Device; Cardiomyopathy.  The patient's BMI is 31.4 kg/m2.  Predicted distance (lower limit of normal) is 415.27 meters.      Test Results:    The test was completed without stopping. The total time walked was 360 seconds. During walking, the patient reported:  Lightheadedness;  Dyspnea, Other (Comment) (Chest Tightness anf Leg Heaviness). The patient used no assistive devices during testing.     03/07/2024---------Distance: 280.42 meters (920 feet)     O2 Sat % Supplemental Oxygen Heart Rate Blood Pressure Renan Scale   Pre-exercise  (Resting) 95 % Room Air 30 bpm Unable to obtain 3   During Exercise 98 % Room Air 30 bpm Unable to obtain 7-8   Post-exercise  (Recovery) 97 % Room Air  30 bpm       Recovery Time:  69 seconds                        The patient walked the first 15 feet in 4.44 seconds.    Performing nurse/tech: César DIAZ      PREVIOUS STUDY:   08/31/2023---------Distance: 274.32 meters (900 feet)       O2 Sat % Supplemental Oxygen Heart Rate Blood Pressure Renan Scale   Pre-exercise  (Resting) 96 % Room Air 42 bpm Unable to obtain 3   During Exercise 93 % Room Air 82 bpm Unable to obtain 9   Post-exercise  (Recovery) 96 % Room Air  69 bpm          Recovery Time:  186 seconds                        The patient walked the first 15 feet in 5.53 seconds.      CLINICAL INTERPRETATION:  Six minute walk distance is 280.42 meters (920 feet) with very heavy dyspnea.  During exercise, there was no desaturation while breathing room air.  Heart rate remained stable with walking.  Bradycardia was present prior to exercise.  The patient reported non-pulmonary symptoms during exercise.  Significant exercise impairment is likely due to cardiovascular causes and subjective symptoms.  The patient did complete the study, walking 360 seconds of the 360 second  test.  Since the previous study in August 2023, exercise capacity is unchanged.  Based upon age and body mass index, exercise capacity is less than predicted.

## 2024-03-07 NOTE — PROGRESS NOTES
[facility]    2024 1:38 PM    Assessment/Plan:    Patient presents today with therapeutic INR.    Assessment of patient findings and chart review: no significant findings     Recommendation for patient's warfarin regimen: Continue current maintenance dose    Recommend repeat INR in 1 week  _________________________________________________________________    Tim Richards (58 y.o.) is followed by the [facility].    Anticoagulation Summary  As of 3/7/2024      INR goal:  2.0-3.0   TTR:  70.5 % (5.4 y)   INR used for dosin.2 (3/7/2024)   Warfarin maintenance plan:  7.5 mg (5 mg x 1.5) every Thu; 5 mg (5 mg x 1) all other days   Weekly warfarin total:  37.5 mg   Plan last modified:  Radha Eugene, PharmD (10/18/2023)   Next INR check:  3/14/2024   Target end date:      Indications    LVAD (left ventricular assist device) present (Resolved) [Z95.811]  Anticoagulation monitoring  INR range 2-3 (Resolved) [Z79.01]                 Anticoagulation Episode Summary       INR check location:  Clinic Lab    Preferred lab:      Send INR reminders to:  [facility] COUMADIN LVAD    Comments:  LVAD// [facility]//Lab [place] Results: [phone] [facility]# [id number] Phone: [phone]//dc 23 [facility] [phone], fax [phone] //pt declined meter, see 10/11/22 & 22 notes// Bridge:NO (h/o bleeds)          Anticoagulation Care Providers       Provider Role Specialty Phone number    Antonio Hadley Jr., MD Responsible Cardiology [phone]

## 2024-03-07 NOTE — PROCEDURES
3/7/2024     2:17 PM 9/8/2023    10:19 AM 8/31/2023    11:48 AM 5/31/2023     2:27 PM 4/13/2023     1:27 PM 2/23/2023     1:52 PM 1/29/2023     4:00 PM   TXP SACHI INTERROGATIONS   Type HeartMate3  HeartMate3 HeartMate3 HeartMate3 HeartMate3    Flow 5.6  5.4 5.5 5.4 5.6    Speed 6300  6200 5400 6400 6400    PI 1.4  1.9 1.3 2.2 4.1    Power ([place]) 5.6  5.3 5.7 5.6 5.7    LSL 5900  5800 6000 6000 6000    Pulsatility No Pulse No Pulse No Pulse Pulse No Pulse Intermittent pulse Pulse   }

## 2024-03-07 NOTE — PROGRESS NOTES
Subjective:   Patient ID:  Tim Richards is a 58 y.o. male who presents for LVAD followup visit.    Implant Date: Heartmate II on 3/8/2018 (outflow graft changed 3/9/2018), exchanged to Heartmate 3 on 7/13/2022    Heartmate 3 RPM 6300     INR goal: 2-3 (INR meter)  NO Bridge with heparin  Antiplatelets: ASA 81mg     GIB: 7/16/19 (10 units of blood)        3/7/2024     2:17 PM 9/8/2023    10:19 AM 8/31/2023    11:48 AM 5/31/2023     2:27 PM 4/13/2023     1:27 PM 2/23/2023     1:52 PM 1/29/2023     4:00 PM   TXP SACHI INTERROGATIONS   Type HeartMate3  HeartMate3 HeartMate3 HeartMate3 HeartMate3    Flow 5.6  5.4 5.5 5.4 5.6    Speed 6300  6200 5400 6400 6400    PI 1.4  1.9 1.3 2.2 4.1    Power (Jackson) 5.6  5.3 5.7 5.6 5.7    LSL 5900  5800 6000 6000 6000    Pulsatility No Pulse No Pulse No Pulse Pulse No Pulse Intermittent pulse Pulse       HPI  Mr. Richards is a very pleasant 59 yo black male with stage D HFrEF who was previously supported with a HM2 (implanted 3/8/2018 as DT-VAD) but required pump exchange (HM3 pump exchange 7/13/2022) for thrombosis of pump post COVID-19 PNA and AHRF. His post-op course following pump exchange was prolonged and complicated by worsening cardiogenic shock/RV failure requiring VA-ECMO. He also had recurrent VT as well as atrial flutter with RVR and is on chronic amiodarone and mexiletine. In June/July 2022 he was tapered of steroids (on for amiodarone hyperthyroid) due to concern for avascular necrosis of hip. 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 He completed course of ertapenem and voriconazole. He has done really well since. Today comes for his regular VAD visit and only complaint he has is lightheadedness and had  several speed  "drops and PI events.  VAD speed is at 6300 rpm. No VAD alarms noted on interrogation occasional PI events . BP is 84. DLES is a "1". INR is therapeutic at 2.2. LDh is at baseline.     2D Echo with CFD done today  LVAD: There is a Heartmate III LVAD running at 6300 RPM. The aortic valve does not open. The ventricular septum is at midline. Inflow cannula well seated at the apex. Outflow graft not visualized.    Left Ventricle: The left ventricle is severely dilated. Mildly increased ventricular mass. Normal wall thickness. There is eccentric hypertrophy. Global hypokinesis present. There is severely reduced systolic function with a visually estimated ejection fraction of 10 -15%.    Right Ventricle: Moderate right ventricular enlargement. Wall thickness is normal. Right ventricle wall motion has global hypokinesis. Systolic function is moderately reduced.    Left Atrium: Left atrium is mildly dilated.    Right Atrium: Right atrium is mildly dilated.    Pulmonary Artery: The estimated pulmonary artery systolic pressure is 23 mmHg.    IVC/SVC: Normal venous pressure at 3 mmHg.    Technically poor study.    Review of Systems   Constitutional: Negative. Negative for chills, decreased appetite, diaphoresis, fever, malaise/fatigue, night sweats, weight gain and weight loss.   Eyes: Negative.    Cardiovascular:  Negative for chest pain, claudication, cyanosis, dyspnea on exertion, irregular heartbeat, leg swelling, near-syncope, orthopnea, palpitations, paroxysmal nocturnal dyspnea and syncope.   Respiratory:  Negative for cough, hemoptysis and shortness of breath.    Endocrine: Negative.    Hematologic/Lymphatic: Negative.    Skin:  Negative for color change, dry skin and nail changes.   Musculoskeletal: Negative.    Gastrointestinal: Negative.    Genitourinary: Negative.    Neurological:  Negative for weakness.       Objective:   Blood pressure (!) 84/0, temperature 98.8 °F (37.1 °C), temperature source Oral, height 6' 1" " "(1.854 m), weight 107 kg (236 lb).body mass index is 31.14 kg/m².    Doppler: 84    Physical Exam  Vitals reviewed.   Constitutional:       Appearance: He is well-developed.      Comments: BP (!) 84/0 (BP Location: Left arm, Patient Position: Sitting)   Temp 98.8 °F (37.1 °C) (Oral)   Ht 6' 1" (1.854 m)   Wt 107 kg (236 lb)   BMI 31.14 kg/m²      HENT:      Head: Normocephalic.   Neck:      Vascular: No carotid bruit or JVD.   Cardiovascular:      Heart sounds: No murmur heard.     Comments: Smooth VAD hum. DLES is "1"  Pulmonary:      Effort: Pulmonary effort is normal.      Breath sounds: Normal breath sounds.   Abdominal:      General: Bowel sounds are normal.      Palpations: Abdomen is soft.   Skin:     General: Skin is warm.   Neurological:      Mental Status: He is alert.         Lab Results   Component Value Date    WBC 2.83 (L) 03/07/2024    HGB 14.9 03/07/2024    HCT 48.6 03/07/2024    MCV 94 03/07/2024     03/07/2024    CO2 26 03/07/2024    CREATININE 2.4 (H) 03/07/2024    CALCIUM 9.8 03/07/2024    ALBUMIN 3.9 03/07/2024    AST 58 (H) 03/07/2024    BNP 83 03/07/2024    ALT 40 03/07/2024     03/07/2024       Lab Results   Component Value Date    INR 2.2 (H) 03/07/2024    INR 2.2 (H) 02/29/2024    INR 2.9 (H) 02/23/2024       BNP   Date Value Ref Range Status   03/07/2024 83 0 - 99 pg/mL Final     Comment:     Values of less than 100 pg/ml are consistent with non-CHF populations.   01/18/2024 262 (H) 0 - 99 pg/mL Final     Comment:     Values of less than 100 pg/ml are consistent with non-CHF populations.   12/07/2023 209 (H) 0 - 99 pg/mL Final     Comment:     Values of less than 100 pg/ml are consistent with non-CHF populations.       LD   Date Value Ref Range Status   03/07/2024 225 110 - 260 U/L Final     Comment:     Results are increased in hemolyzed samples.   12/07/2023 213 110 - 260 U/L Final     Comment:     Results are increased in hemolyzed samples.   08/31/2023 193 110 - 260 " U/L Final     Comment:     Results are increased in hemolyzed samples.       Assessment:      1. LVAD (left ventricular assist device) present    2. Chronic combined systolic and diastolic congestive heart failure    3. PVT (paroxysmal ventricular tachycardia)    4. PVD (peripheral vascular disease)    5. Dilated cardiomyopathy    6. Stage 3b chronic kidney disease    7. CHICHI (obstructive sleep apnea)    8. Primary hypertension    9. History of ventricular tachycardia        Plan:   Patient is now NYHA class II. BP is controlled. Appears euvolemic on exam. His BNP is low and his echo showed low filling pressures. Creatinine is up to 2.4. Therefore, will decrease his Torsemide to 20 mg once week (every Monday. Was taking 40 mg once  a week) INR is therapeutic.   Labs next week.   VAD interrogation was performed in clinic  Any VAD management kits dispensed today medically necessary  Recommend 2 gram sodium restriction and 1500cc fluid restriction.  Encourage physical activity with graded exercise program.  Requested patient to weigh themselves daily, and to notify us if their weight increases by more than 3 lbs in 1 day or 5 lbs in 1 week.   Additionally, the patient has a patient centered goal of being able to get better  RTC in 4 months     Patient advised that it is recommended that all patients, and their close contacts and household members receive Covid vaccination.    Listed for transplant: No. Implanted as DT.    UNOS Patient Status  Functional Status: 50% - Requires considerable assistance and frequent medical care  Physical Capacity: No Limitations  Working for Income: Unknown      Guerda Bautista MD

## 2024-03-08 NOTE — PROGRESS NOTES
"Date of Implant with Heartmate 3 LVAD: 7/13/22    PATIENT ARRIVED IN CLINIC:  Ambulatory   Accompanied by: self  Complaints/reason for visit today: routine    Vitals  Temperature, oral:   Temp Readings from Last 1 Encounters:   03/07/24 98.8 °F (37.1 °C) (Oral)     Blood Pressure:   BP Readings from Last 3 Encounters:   03/07/24 (!) 84/0   12/07/23 (!) 80/0   10/24/23 (!) 88/0        VAD Interrogation:      3/7/2024     2:17 PM 9/8/2023    10:19 AM 8/31/2023    11:48 AM   TXP SACHI INTERROGATIONS   Type HeartMate3  HeartMate3   Flow 5.6  5.4   Speed 6300  6200   PI 1.4  1.9   Power (Jackson) 5.6  5.3   LSL 5900  5800   Pulsatility No Pulse No Pulse No Pulse     HCT:   Lab Results   Component Value Date    HCT 48.6 03/07/2024    HCT 31 (L) 09/27/2022       Problems / Issues / Alarms with VAD if any: No alarms but speed drops with PI events noted. PI today is 1.6-1.9.   VAD Sounds: HM3 Smooth    VAD Binder With Patient: No  Reviewed VAD Numbers In Binder: No    Equipment:  Emergency Equipment With Patient: Yes  Any Equipment Issues: None noted   It is medically necessary to ensure patient has properly functioning equipment and wearables to prevent infection, injury or death to patient.     DLES Assessment and Dressing Care:  Appearance of DLES: "1" with scant amount of purulent drainage; much less than in December.   Antibiotics: YES doxy  Velour: No  Manual & Visual Inspection Of Driveline: No kinks or tears noted  Stabilization Device In Use: yes, sun securement device    Heartmate 3 Module Cable:  No yellow exposed and Attempted to unscrew modular cable to ensure it will be able to come lose in the event we ever need to change the modular cable while patient held the driveline in place so it would not move. Modular cable connection able to be unscrewed and re-tightened. Instructed pt to perform this weekly.  Frequency of Dressing Changes: twice per week & daily kit   Pt In Need Of Management Kits?:yes -   1 Box of " "daily kit  It is medically necessary to have VAD management kits in order to prevent infection or to assist in the healing of an infected DLES.    Patient MyChart Questionnaire:       7/7/2021    11:00 AM   VAD QUESTIONNAIRE ANSWERS   Have you had any LVAD related issues or alarms? No   Have you had any LVAD Equipment issues? Yes   If yes, please provide additional details: Controller or batteries   How often do you do your LVAD dressing change? Every other day   What type of dressing change do you do?  Daily "scrubby" kits   Do you need dressing change supplies? Yes   If yes, what kind (i.e. boxes/kits)? Box   Do you need any new LVAD Accessories? (i.e Battery Holster Vest, Holster Vest, RT/LT Consolidation Bag) No   If yes, what kind of accessories do you need? Na   Have you been using your Monthly Equipment Checklist? Yes   Description of Stools: Normal and formed without blood   How Often: Weekly   Color Of Urine: Clear/Yellow   Are you experiencing: Anxiety   Do you see a: Psychiatrist   How many hours per night are you sleeping? 7   Do you take any medications to help you fall asleep? Yes   If yes, what medications do you take to help you fall asleep?  Xanex   Are you Showering? Yes   Do you change your dressing immediately after you dry off?  Yes   Are you exercising? Yes   If so, what do you do and for how long per day? 1hr   How often are you exercising? Daily   Are you working or what do you do to stay active? N/a   Are you driving? Yes   Do you know that if you have an alarm while driving you need to pull over first before looking at your alarm to avoid an accident? Yes   Are you in pain? No   Do you take any medications for pain?  No   What can we do for you? Anything   Is your appointment today? Yes   Do you have your Emergency Bag with you? Yes   Do you have your VAD Binder with you in clinic today?  No        Assessment/ Quality of Life [place]:   Complaints Of Nausea / Vomiting: None noted  " "  Appearance and Frequency Of Stools: normal and formed without blood & daily  Color Of Urine: clear/yellow  Pain: NO  Coping/Depression/Anxiety: coping okay  Sleep Habits: 8 hrs /night  Sleep Aids:  Remeron and Ambien  Showering: Yes, reminded to change dressing immediately after drying off  Self- Care I have no problems with self- care  Mobility I have no problems walking about  Usual Activities I have no problems with performing my usual activities  Activity/Exercise: walking and joined a gym   Driving: Yes. Reminded to pull over should there be an alarm before looking down at controller.  Additional Comments: "Fall x 2 in the past month. Feels lightheaded when I stand up or I turn around too fast."    Labs:    Chemistry        Component Value Date/Time     03/07/2024 1213    K 4.4 03/07/2024 1213     03/07/2024 1213    CO2 26 03/07/2024 1213    BUN 25 (H) 03/07/2024 1213    CREATININE 2.4 (H) 03/07/2024 1213    GLU 75 03/07/2024 1213        Component Value Date/Time    CALCIUM 9.8 03/07/2024 1213    ALKPHOS 89 03/07/2024 1213    AST 58 (H) 03/07/2024 1213    ALT 40 03/07/2024 1213    BILITOT 0.5 03/07/2024 1213    ESTGFRAFRICA 37.6 (A) 07/31/2022 2027    EGFRNONAA 32.5 (A) 07/31/2022 2027            Magnesium   Date Value Ref Range Status   03/07/2024 2.3 1.6 - 2.6 mg/dL Final       Lab Results   Component Value Date    WBC 2.83 (L) 03/07/2024    HGB 14.9 03/07/2024    HCT 48.6 03/07/2024    MCV 94 03/07/2024     03/07/2024       Lab Results   Component Value Date    INR 2.2 (H) 03/07/2024    INR 2.2 (H) 02/29/2024    INR 2.9 (H) 02/23/2024       BNP   Date Value Ref Range Status   03/07/2024 83 0 - 99 pg/mL Final     Comment:     Values of less than 100 pg/ml are consistent with non-CHF populations.   01/18/2024 262 (H) 0 - 99 pg/mL Final     Comment:     Values of less than 100 pg/ml are consistent with non-CHF populations.   12/07/2023 209 (H) 0 - 99 pg/mL Final     Comment:     Values of " less than 100 pg/ml are consistent with non-CHF populations.       LD   Date Value Ref Range Status   03/07/2024 225 110 - 260 U/L Final     Comment:     Results are increased in hemolyzed samples.   12/07/2023 213 110 - 260 U/L Final     Comment:     Results are increased in hemolyzed samples.   08/31/2023 193 110 - 260 U/L Final     Comment:     Results are increased in hemolyzed samples.       Labs reviewed with patient: YES     Medication Reconciliation: per MA.  New Medication Detail Provided: yes  Coumadin Managed by: [facility]    Education: Reviewed driveline care, emergency procedures, how to change the controller, alarms with patient, as well as discussed how to page the VAD coordinator in case of an emergency.   Educated patient/family that chest compressions are allowed in the event they are needed.    Reminded patient/caregiver not to touch their face and to cover their mouth when they cough or sneeze.   Vaccines: Pt informed that we are encouraging all VAD patients to receive the Flu and COVID vaccines and boosters. Informed pt that they can take tylenol but should avoid other NSAIDs.       Plans/Needs: Routine f/u. Pt endorses 2 falls in the past 1 month. He states that one fall was standing up and turning around too quick; he fell on the coffee table and has a bruise to his LUE. The other fall was walking back from the bathroom and he tripped on a trash can but felt dizzy while walking. Patient has multiple speed drops with PI down to 1.6-1.9. PI is low today in clinic. Pt admits to feeling palpitations and feeling dry. 6MW done today- he has a decrease in his length of walking then previous. Echo done today- reviewed by Dr. Bautista.    Plan to decrease Torsemide from 40mg once weekly to 20mg once weekly. Labs next Thursday.     I sent a message to Dr. Rhodes and coumadin clinic for INR meter.     RTC in 4 months with TSH/T4 and PFTs.     Hurricane Season: No

## 2024-03-12 NOTE — PROGRESS NOTES
Pt consented to be processed for INR meter through home monitoring company. I instructed him on meter set up, meter protocol in [facility], must continue going to lab appointments until trained by home monitoring company on meter, call Coumadin Clinic with meter training date ( only train Monday- Thursday), and difference in cost/test frequency related to using INR meter versus going to lab appointments for INR results which he verbalized understanding. He verified insurance and address for meter and supplies to be shipped to by home monitoring company.

## 2024-03-14 ENCOUNTER — LAB VISIT (OUTPATIENT)
Dept: LAB | Facility: HOSPITAL | Age: 59
End: 2024-03-14
Attending: INTERNAL MEDICINE
Payer: MEDICARE

## 2024-03-14 ENCOUNTER — ANTI-COAG VISIT (OUTPATIENT)
Dept: CARDIOLOGY | Facility: CLINIC | Age: 59
End: 2024-03-14
Payer: MEDICARE

## 2024-03-14 DIAGNOSIS — Z95.811 LVAD (LEFT VENTRICULAR ASSIST DEVICE) PRESENT: Chronic | ICD-10-CM

## 2024-03-14 DIAGNOSIS — Z79.01 LONG TERM (CURRENT) USE OF ANTICOAGULANTS: ICD-10-CM

## 2024-03-14 LAB
ALBUMIN SERPL BCP-MCNC: 4.3 G/DL (ref 3.5–5.2)
ALP SERPL-CCNC: 96 U/L (ref 55–135)
ALT SERPL W/O P-5'-P-CCNC: 124 U/L (ref 10–44)
ANION GAP SERPL CALC-SCNC: 20 MMOL/L (ref 8–16)
AST SERPL-CCNC: 135 U/L (ref 10–40)
BILIRUB SERPL-MCNC: 0.7 MG/DL (ref 0.1–1)
BNP SERPL-MCNC: 76 PG/ML (ref 0–99)
BUN SERPL-MCNC: 19 MG/DL (ref 6–20)
CALCIUM SERPL-MCNC: 9.8 MG/DL (ref 8.7–10.5)
CHLORIDE SERPL-SCNC: 99 MMOL/L (ref 95–110)
CO2 SERPL-SCNC: 19 MMOL/L (ref 23–29)
CREAT SERPL-MCNC: 2.6 MG/DL (ref 0.5–1.4)
EST. GFR  (NO RACE VARIABLE): 28 ML/MIN/1.73 M^2
GLUCOSE SERPL-MCNC: 59 MG/DL (ref 70–110)
INR PPP: 2.5 (ref 0.8–1.2)
POTASSIUM SERPL-SCNC: 3.6 MMOL/L (ref 3.5–5.1)
PROT SERPL-MCNC: 8.7 G/DL (ref 6–8.4)
PROTHROMBIN TIME: 25.4 SEC (ref 9–12.5)
SODIUM SERPL-SCNC: 138 MMOL/L (ref 136–145)

## 2024-03-14 PROCEDURE — 93793 ANTICOAG MGMT PT WARFARIN: CPT | Mod: S$GLB,,,

## 2024-03-14 PROCEDURE — 85610 PROTHROMBIN TIME: CPT | Performed by: INTERNAL MEDICINE

## 2024-03-14 PROCEDURE — 36415 COLL VENOUS BLD VENIPUNCTURE: CPT | Performed by: INTERNAL MEDICINE

## 2024-03-14 PROCEDURE — 80053 COMPREHEN METABOLIC PANEL: CPT | Performed by: INTERNAL MEDICINE

## 2024-03-14 PROCEDURE — 83880 ASSAY OF NATRIURETIC PEPTIDE: CPT | Performed by: INTERNAL MEDICINE

## 2024-03-15 ENCOUNTER — PATIENT MESSAGE (OUTPATIENT)
Dept: TRANSPLANT | Facility: CLINIC | Age: 59
End: 2024-03-15
Payer: MEDICARE

## 2024-03-15 ENCOUNTER — TELEPHONE (OUTPATIENT)
Dept: TRANSPLANT | Facility: CLINIC | Age: 59
End: 2024-03-15
Payer: MEDICARE

## 2024-03-15 NOTE — PROGRESS NOTES
[facility]    03/15/2024 8:24 AM    Assessment/Plan:    Patient presents today with therapeutic INR.    Assessment of patient findings and chart review: no significant findings     Recommendation for patient's warfarin regimen: Continue current maintenance dose    Recommend repeat INR in 1 week  _________________________________________________________________    Tim Richards (58 y.o.) is followed by the [facility].    Anticoagulation Summary  As of 3/14/2024      INR goal:  2.0-3.0   TTR:  70.6 % (5.4 y)   INR used for dosin.5 (3/14/2024)   Warfarin maintenance plan:  7.5 mg (5 mg x 1.5) every Thu; 5 mg (5 mg x 1) all other days   Weekly warfarin total:  37.5 mg   Plan last modified:  Radha Eugene, PharmD (10/18/2023)   Next INR check:  3/21/2024   Target end date:      Indications    LVAD (left ventricular assist device) present (Resolved) [Z95.811]  Anticoagulation monitoring  INR range 2-3 (Resolved) [Z79.01]                 Anticoagulation Episode Summary       INR check location:  Clinic Lab    Preferred lab:      Send INR reminders to:  [facility] LVAD    Comments:  LVAD/ [facility] Lab/Lab [place] Results:[phone] [facility]# [id number] Phone: [phone]/dc1/ [facility] [phone],fax [phone]/ Bridge:NO(h/o bleeds) see 3/12 encounter for meter process          Anticoagulation Care Providers       Provider Role Specialty Phone number    Antonio Hadley Jr., MD Responsible Cardiology [phone]

## 2024-03-15 NOTE — PROGRESS NOTES
Jordan Colon,   We saw Mr. Richards last week and decreased his Torsemide from 40mg once a week to 20mg once a week. Are you OK with stopping it all together? BNP is down more. Creatinine and LFTs are more elevated.

## 2024-03-15 NOTE — TELEPHONE ENCOUNTER
Called patient, no answer and voicemail cut me off.     Sent patient a MyChart message. Labs scheduled for Monday.     ----- Message from Isaiah Santizo MD sent at 3/15/2024  9:45 AM CDT -----  It is Ok.  Thx.      ----- Message -----  From: Bridget Freeman RN  Sent: 3/15/2024   9:22 AM CDT  To: Paz Bai RN; Bridget Freeman RN; #    Hi Isaiah,   We saw Mr. Richards last week and decreased his Torsemide from 40mg once a week to 20mg once a week. Are you OK with stopping it all together? BNP is down more. Creatinine and LFTs are more elevated.

## 2024-03-20 ENCOUNTER — PATIENT MESSAGE (OUTPATIENT)
Dept: FAMILY MEDICINE | Facility: CLINIC | Age: 59
End: 2024-03-20
Payer: MEDICARE

## 2024-03-20 DIAGNOSIS — E53.8 B12 DEFICIENCY: ICD-10-CM

## 2024-03-20 RX ORDER — ZOLPIDEM TARTRATE 12.5 MG/1
12.5 TABLET, FILM COATED, EXTENDED RELEASE ORAL NIGHTLY PRN
Qty: 30 TABLET | Refills: 2 | Status: SHIPPED | OUTPATIENT
Start: 2024-03-20 | End: 2024-09-18

## 2024-03-20 RX ORDER — CYANOCOBALAMIN 1000 UG/ML
1000 INJECTION, SOLUTION INTRAMUSCULAR; SUBCUTANEOUS WEEKLY
Qty: 12 ML | Refills: 1 | Status: SHIPPED | OUTPATIENT
Start: 2024-03-20 | End: 2024-04-16

## 2024-03-20 NOTE — TELEPHONE ENCOUNTER
Unable to retrieve patient chart and identify care due.  [facility] Embedded Care Due Messages. Reference number: [id number].   3/20/2024 1:45:12 AM CDT

## 2024-03-20 NOTE — TELEPHONE ENCOUNTER
Last Office Visit Info:   The patient's last visit with Diego Daniel MD was on 10/5/2023.    The patient's last visit in current department was on 10/24/2023.

## 2024-03-21 ENCOUNTER — LAB VISIT (OUTPATIENT)
Dept: LAB | Facility: HOSPITAL | Age: 59
End: 2024-03-21
Attending: INTERNAL MEDICINE
Payer: MEDICARE

## 2024-03-21 ENCOUNTER — ANTI-COAG VISIT (OUTPATIENT)
Dept: CARDIOLOGY | Facility: CLINIC | Age: 59
End: 2024-03-21
Payer: MEDICARE

## 2024-03-21 DIAGNOSIS — Z95.811 LVAD (LEFT VENTRICULAR ASSIST DEVICE) PRESENT: Chronic | ICD-10-CM

## 2024-03-21 DIAGNOSIS — Z79.01 LONG TERM (CURRENT) USE OF ANTICOAGULANTS: ICD-10-CM

## 2024-03-21 LAB
ALBUMIN SERPL BCP-MCNC: 3.9 G/DL (ref 3.5–5.2)
ALP SERPL-CCNC: 115 U/L (ref 55–135)
ALT SERPL W/O P-5'-P-CCNC: 84 U/L (ref 10–44)
ANION GAP SERPL CALC-SCNC: 7 MMOL/L (ref 8–16)
AST SERPL-CCNC: 86 U/L (ref 10–40)
BILIRUB DIRECT SERPL-MCNC: 0.3 MG/DL (ref 0.1–0.3)
BILIRUB SERPL-MCNC: 0.6 MG/DL (ref 0.1–1)
BNP SERPL-MCNC: 67 PG/ML (ref 0–99)
BUN SERPL-MCNC: 19 MG/DL (ref 6–20)
CALCIUM SERPL-MCNC: 9.6 MG/DL (ref 8.7–10.5)
CHLORIDE SERPL-SCNC: 103 MMOL/L (ref 95–110)
CO2 SERPL-SCNC: 25 MMOL/L (ref 23–29)
CREAT SERPL-MCNC: 2.2 MG/DL (ref 0.5–1.4)
EST. GFR  (NO RACE VARIABLE): 34 ML/MIN/1.73 M^2
GLUCOSE SERPL-MCNC: 89 MG/DL (ref 70–110)
INR PPP: 1.5 (ref 0.8–1.2)
POTASSIUM SERPL-SCNC: 4 MMOL/L (ref 3.5–5.1)
PROT SERPL-MCNC: 8.1 G/DL (ref 6–8.4)
PROTHROMBIN TIME: 16.5 SEC (ref 9–12.5)
SODIUM SERPL-SCNC: 135 MMOL/L (ref 136–145)

## 2024-03-21 PROCEDURE — 83880 ASSAY OF NATRIURETIC PEPTIDE: CPT | Performed by: INTERNAL MEDICINE

## 2024-03-21 PROCEDURE — 36415 COLL VENOUS BLD VENIPUNCTURE: CPT | Performed by: INTERNAL MEDICINE

## 2024-03-21 PROCEDURE — 85610 PROTHROMBIN TIME: CPT | Performed by: INTERNAL MEDICINE

## 2024-03-21 PROCEDURE — 80053 COMPREHEN METABOLIC PANEL: CPT | Performed by: INTERNAL MEDICINE

## 2024-03-21 PROCEDURE — 82248 BILIRUBIN DIRECT: CPT | Performed by: INTERNAL MEDICINE

## 2024-03-21 PROCEDURE — 93793 ANTICOAG MGMT PT WARFARIN: CPT | Mod: S$GLB,,,

## 2024-03-21 NOTE — PROGRESS NOTES
[facility]    2024 3:21 PM    Assessment/Plan:    Patient presents today with subtherapeutic  INR.    Assessment of patient findings and chart review: reports missing one dose as well as broccoli intake    Recommendation for patient's warfarin regimen: Boost dose today to 12.5mg then resume current maintenance dose    Recommend repeat INR Monday  _________________________________________________________________    Tim Richards (58 y.o.) is followed by the [facility].    Anticoagulation Summary  As of 3/21/2024      INR goal:  2.0-3.0   TTR:  70.5 % (5.4 y)   INR used for dosin.5 (3/21/2024)   Warfarin maintenance plan:  7.5 mg (5 mg x 1.5) every Thu; 5 mg (5 mg x 1) all other days   Weekly warfarin total:  37.5 mg   Plan last modified:  Radha Eugene, PharmD (10/18/2023)   Next INR check:  3/25/2024   Target end date:      Indications    LVAD (left ventricular assist device) present (Resolved) [Z95.811]  Anticoagulation monitoring  INR range 2-3 (Resolved) [Z79.01]                 Anticoagulation Episode Summary       INR check location:  Clinic Lab    Preferred lab:      Send INR reminders to:  [facility] COUM[facility]IN LVAD    Comments:  LVAD/ [facility] Lab/Lab [place] Results:[phone] [facility]# [id number] Phone: [phone]/dc1/ [facility] -[phone],fax [phone]/ Bridge:NO(h/o bleeds) see 3/12 encounter for meter process          Anticoagulation Care Providers       Provider Role Specialty Phone number    Antonio Hadley Jr., MD Responsible Cardiology [phone]

## 2024-03-21 NOTE — PROGRESS NOTES
Patient reports missing 3/20/2024 Warfarin dose, took correct Warfarin dose all other days, broccoli 3/20/2024, no other changes reported.

## 2024-03-21 NOTE — PROGRESS NOTES
Labs improved since last check, repeat labs next week for trending while continuing d/c torsemide, no additional changes made at this time.

## 2024-03-25 ENCOUNTER — ANTI-COAG VISIT (OUTPATIENT)
Dept: CARDIOLOGY | Facility: CLINIC | Age: 59
End: 2024-03-25
Payer: MEDICARE

## 2024-03-25 ENCOUNTER — LAB VISIT (OUTPATIENT)
Dept: LAB | Facility: HOSPITAL | Age: 59
End: 2024-03-25
Attending: INTERNAL MEDICINE
Payer: MEDICARE

## 2024-03-25 DIAGNOSIS — Z95.811 LVAD (LEFT VENTRICULAR ASSIST DEVICE) PRESENT: Chronic | ICD-10-CM

## 2024-03-25 DIAGNOSIS — Z79.01 LONG TERM (CURRENT) USE OF ANTICOAGULANTS: ICD-10-CM

## 2024-03-25 LAB
ALBUMIN SERPL BCP-MCNC: 4.1 G/DL (ref 3.5–5.2)
ALP SERPL-CCNC: 103 U/L (ref 55–135)
ALT SERPL W/O P-5'-P-CCNC: 82 U/L (ref 10–44)
ANION GAP SERPL CALC-SCNC: 12 MMOL/L (ref 8–16)
AST SERPL-CCNC: 105 U/L (ref 10–40)
BILIRUB DIRECT SERPL-MCNC: 0.3 MG/DL (ref 0.1–0.3)
BILIRUB SERPL-MCNC: 0.5 MG/DL (ref 0.1–1)
BNP SERPL-MCNC: 69 PG/ML (ref 0–99)
BUN SERPL-MCNC: 18 MG/DL (ref 6–20)
CALCIUM SERPL-MCNC: 9.4 MG/DL (ref 8.7–10.5)
CHLORIDE SERPL-SCNC: 100 MMOL/L (ref 95–110)
CO2 SERPL-SCNC: 27 MMOL/L (ref 23–29)
CREAT SERPL-MCNC: 2.6 MG/DL (ref 0.5–1.4)
EST. GFR  (NO RACE VARIABLE): 28 ML/MIN/1.73 M^2
GLUCOSE SERPL-MCNC: 100 MG/DL (ref 70–110)
INR PPP: 2.2 (ref 0.8–1.2)
POTASSIUM SERPL-SCNC: 3.8 MMOL/L (ref 3.5–5.1)
PROT SERPL-MCNC: 8.4 G/DL (ref 6–8.4)
PROTHROMBIN TIME: 23.2 SEC (ref 9–12.5)
SODIUM SERPL-SCNC: 139 MMOL/L (ref 136–145)

## 2024-03-25 PROCEDURE — 80053 COMPREHEN METABOLIC PANEL: CPT | Performed by: INTERNAL MEDICINE

## 2024-03-25 PROCEDURE — 93793 ANTICOAG MGMT PT WARFARIN: CPT | Mod: S$GLB,,,

## 2024-03-25 PROCEDURE — 85610 PROTHROMBIN TIME: CPT | Performed by: INTERNAL MEDICINE

## 2024-03-25 PROCEDURE — 83880 ASSAY OF NATRIURETIC PEPTIDE: CPT | Performed by: INTERNAL MEDICINE

## 2024-03-25 PROCEDURE — 82248 BILIRUBIN DIRECT: CPT | Performed by: INTERNAL MEDICINE

## 2024-03-25 NOTE — PROGRESS NOTES
[facility]    2024 3:23 PM    Assessment/Plan:    Patient presents today with therapeutic INR.    Assessment of patient findings and chart review: no significant changes noted    Recommendation for patient's warfarin regimen: Continue current maintenance dose    Recommend repeat INR in 1 week  _________________________________________________________________    Tim Richards (58 y.o.) is followed by the [facility].    Anticoagulation Summary  As of 3/25/2024      INR goal:  2.0-3.0   TTR:  70.4 % (5.5 y)   INR used for dosin.2 (3/25/2024)   Warfarin maintenance plan:  7.5 mg (5 mg x 1.5) every Thu; 5 mg (5 mg x 1) all other days   Weekly warfarin total:  37.5 mg   Plan last modified:  Radha Eugene, PharmD (10/18/2023)   Next INR check:  2024   Target end date:      Indications    LVAD (left ventricular assist device) present (Resolved) [Z95.811]  Anticoagulation monitoring  INR range 2-3 (Resolved) [Z79.01]                 Anticoagulation Episode Summary       INR check location:  Clinic Lab    Preferred lab:      Send INR reminders to:  [facility] COUMADIN LVAD    Comments:  LVAD/ [facility] Lab/Lab [place] Results:[phone] [facility]# [id number] Phone: [phone]/dc1/ [facility] [phone],fax [phone]/ Bridge:NO(h/o bleeds) see 3/12 encounter for meter process          Anticoagulation Care Providers       Provider Role Specialty Phone number    Antonio Hadley Jr., MD Responsible Cardiology [phone]

## 2024-03-26 ENCOUNTER — TELEPHONE (OUTPATIENT)
Dept: TRANSPLANT | Facility: CLINIC | Age: 59
End: 2024-03-26
Payer: MEDICARE

## 2024-03-26 RX ORDER — AMLODIPINE BESYLATE 10 MG/1
10 TABLET ORAL
Qty: 90 TABLET | Refills: 3 | Status: SHIPPED | OUTPATIENT
Start: 2024-03-26

## 2024-03-28 NOTE — TELEPHONE ENCOUNTER
Called and spoke to mom scheduled  check for 3/16/23   Cr remains elevated. Patient states he did take 40mg torsemide last Friday because he was feeling volume overloaded. Advised patient not to take any diuretics unless instructed to by this clinic. Will repeat labs next week.

## 2024-04-03 ENCOUNTER — LAB VISIT (OUTPATIENT)
Dept: LAB | Facility: HOSPITAL | Age: 59
End: 2024-04-03
Attending: INTERNAL MEDICINE
Payer: MEDICARE

## 2024-04-03 ENCOUNTER — ANTI-COAG VISIT (OUTPATIENT)
Dept: CARDIOLOGY | Facility: CLINIC | Age: 59
End: 2024-04-03
Payer: MEDICARE

## 2024-04-03 DIAGNOSIS — Z95.811 LVAD (LEFT VENTRICULAR ASSIST DEVICE) PRESENT: ICD-10-CM

## 2024-04-03 DIAGNOSIS — Z79.01 LONG TERM (CURRENT) USE OF ANTICOAGULANTS: ICD-10-CM

## 2024-04-03 LAB
INR PPP: 2.3 (ref 0.8–1.2)
PROTHROMBIN TIME: 23.5 SEC (ref 9–12.5)

## 2024-04-03 PROCEDURE — 36415 COLL VENOUS BLD VENIPUNCTURE: CPT | Performed by: INTERNAL MEDICINE

## 2024-04-03 PROCEDURE — 93793 ANTICOAG MGMT PT WARFARIN: CPT | Mod: S$GLB,,,

## 2024-04-03 PROCEDURE — 85610 PROTHROMBIN TIME: CPT | Performed by: INTERNAL MEDICINE

## 2024-04-03 NOTE — PROGRESS NOTES
[facility]    2024 2:00 PM    Assessment/Plan:    Patient presents today with therapeutic INR.    Assessment of patient findings and chart review: no significant findings     Recommendation for patient's warfarin regimen: Continue current maintenance dose    Recommend repeat INR in 1 week  _________________________________________________________________    Tim Richards (58 y.o.) is followed by the [facility].    Anticoagulation Summary  As of 4/3/2024      INR goal:  2.0-3.0   TTR:  70.6 % (5.5 y)   INR used for dosin.3 (4/3/2024)   Warfarin maintenance plan:  7.5 mg (5 mg x 1.5) every Thu; 5 mg (5 mg x 1) all other days   Weekly warfarin total:  37.5 mg   Plan last modified:  Radha Eugene, PharmD (10/18/2023)   Next INR check:  4/10/2024   Target end date:      Indications    LVAD (left ventricular assist device) present (Resolved) [Z95.811]  Anticoagulation monitoring  INR range 2-3 (Resolved) [Z79.01]                 Anticoagulation Episode Summary       INR check location:  Clinic Lab    Preferred lab:      Send INR reminders to:  FAUSTINA COUMADIN LVAD    Comments:  LVAD/ [facility] Lab/Lab [place] Results:[phone] [facility]# [id number] Phone: [phone]/dc1/ [facility] [phone],fax [phone]/ Bridge:NO(h/o bleeds) see 3/12 encounter for meter process          Anticoagulation Care Providers       Provider Role Specialty Phone number    Antonio Hadley Jr., MD Responsible Cardiology [phone]

## 2024-04-09 NOTE — PROGRESS NOTES
4/09/24 Patient called to report he canceled 4/10 lab due to expected bad weather, he rescheduled to 4/12 at [facility]

## 2024-04-09 NOTE — PROCEDURES
TXP SACHI INTERROGATIONS 12/30/2020 10/26/2020 10/1/2020 9/24/2020 9/15/2020 9/15/2020 9/14/2020   Type HeartMate3 HeartMate II HeartMate II HeartMate II - - -   Flow 4.5 4.8 5.8 4.9 - - -   Speed 9800 9800 9800 9800 - - -   PI 3.4 3.6 3.7 2.7 - - -   Power (Jackson) 5.8 6.1 6.7 6.1 - - -   LSL 9400 9400 - 9400 - - -   Pulsatility No Pulse Pulse - Intermittent pulse Intermittent pulse Intermittent pulse Intermittent pulse   }   There are no preventive care reminders to display for this patient.    Patient is up to date, no discussion needed.

## 2024-04-10 DIAGNOSIS — Z95.811 LVAD (LEFT VENTRICULAR ASSIST DEVICE) PRESENT: Primary | ICD-10-CM

## 2024-04-10 DIAGNOSIS — I47.20 VT (VENTRICULAR TACHYCARDIA): ICD-10-CM

## 2024-04-12 ENCOUNTER — TELEPHONE (OUTPATIENT)
Dept: TRANSPLANT | Facility: CLINIC | Age: 59
End: 2024-04-12
Payer: MEDICARE

## 2024-04-12 ENCOUNTER — ANTI-COAG VISIT (OUTPATIENT)
Dept: CARDIOLOGY | Facility: CLINIC | Age: 59
End: 2024-04-12
Payer: MEDICARE

## 2024-04-12 ENCOUNTER — LAB VISIT (OUTPATIENT)
Dept: LAB | Facility: HOSPITAL | Age: 59
End: 2024-04-12
Attending: INTERNAL MEDICINE
Payer: MEDICARE

## 2024-04-12 DIAGNOSIS — Z95.811 LVAD (LEFT VENTRICULAR ASSIST DEVICE) PRESENT: Chronic | ICD-10-CM

## 2024-04-12 DIAGNOSIS — Z95.811 HEART REPLACED BY HEART ASSIST DEVICE: ICD-10-CM

## 2024-04-12 LAB
ALBUMIN SERPL BCP-MCNC: 3.9 G/DL (ref 3.5–5.2)
ALP SERPL-CCNC: 92 U/L (ref 55–135)
ALT SERPL W/O P-5'-P-CCNC: 17 U/L (ref 10–44)
ANION GAP SERPL CALC-SCNC: 7 MMOL/L (ref 8–16)
AST SERPL-CCNC: 34 U/L (ref 10–40)
BILIRUB DIRECT SERPL-MCNC: 0.3 MG/DL (ref 0.1–0.3)
BILIRUB SERPL-MCNC: 0.6 MG/DL (ref 0.1–1)
BNP SERPL-MCNC: 398 PG/ML (ref 0–99)
BUN SERPL-MCNC: 12 MG/DL (ref 6–20)
CALCIUM SERPL-MCNC: 9.5 MG/DL (ref 8.7–10.5)
CHLORIDE SERPL-SCNC: 106 MMOL/L (ref 95–110)
CO2 SERPL-SCNC: 23 MMOL/L (ref 23–29)
CREAT SERPL-MCNC: 2 MG/DL (ref 0.5–1.4)
EST. GFR  (NO RACE VARIABLE): 38 ML/MIN/1.73 M^2
GLUCOSE SERPL-MCNC: 88 MG/DL (ref 70–110)
INR PPP: 2.3 (ref 0.8–1.2)
POTASSIUM SERPL-SCNC: 3.9 MMOL/L (ref 3.5–5.1)
PROT SERPL-MCNC: 7.9 G/DL (ref 6–8.4)
PROTHROMBIN TIME: 23.5 SEC (ref 9–12.5)
SODIUM SERPL-SCNC: 136 MMOL/L (ref 136–145)

## 2024-04-12 PROCEDURE — 80053 COMPREHEN METABOLIC PANEL: CPT | Performed by: INTERNAL MEDICINE

## 2024-04-12 PROCEDURE — 93793 ANTICOAG MGMT PT WARFARIN: CPT | Mod: S$GLB,,,

## 2024-04-12 PROCEDURE — 82248 BILIRUBIN DIRECT: CPT | Performed by: INTERNAL MEDICINE

## 2024-04-12 PROCEDURE — 85610 PROTHROMBIN TIME: CPT | Performed by: INTERNAL MEDICINE

## 2024-04-12 PROCEDURE — 83880 ASSAY OF NATRIURETIC PEPTIDE: CPT | Performed by: INTERNAL MEDICINE

## 2024-04-12 PROCEDURE — 36415 COLL VENOUS BLD VENIPUNCTURE: CPT | Performed by: INTERNAL MEDICINE

## 2024-04-12 NOTE — PROGRESS NOTES
[facility]    2024 1:52 PM    Assessment/Plan:    Patient presents today with therapeutic INR.    Assessment of patient findings and chart review: no significant findings     Recommendation for patient's warfarin regimen: Continue current maintenance dose    Recommend repeat INR in 1 week  _________________________________________________________________    Tim Richards (58 y.o.) is followed by the [facility].    Anticoagulation Summary  As of 2024      INR goal:  2.0-3.0   TTR:  70.7 % (5.5 y)   INR used for dosin.3 (2024)   Warfarin maintenance plan:  7.5 mg (5 mg x 1.5) every Thu; 5 mg (5 mg x 1) all other days   Weekly warfarin total:  37.5 mg   Plan last modified:  Radha Eugene, PharmD (10/18/2023)   Next INR check:  2024   Target end date:      Indications    LVAD (left ventricular assist device) present (Resolved) [Z95.811]  Anticoagulation monitoring  INR range 2-3 (Resolved) [Z79.01]                 Anticoagulation Episode Summary       INR check location:  Clinic Lab    Preferred lab:      Send INR reminders to:  FAUSTINA COUMADIN LVAD    Comments:  [facility] [place] Results:[phone] [facility]# [id number] Phone: [phone]/dc1/ [facility] [phone],fax [phone]/ Bridge:NO(h/o bleeds) see 3/12 encounter for meter process          Anticoagulation Care Providers       Provider Role Specialty Phone number    Antonio Hadley Jr., MD Responsible Cardiology [phone]

## 2024-04-15 ENCOUNTER — PATIENT MESSAGE (OUTPATIENT)
Dept: TRANSPLANT | Facility: CLINIC | Age: 59
End: 2024-04-15
Payer: MEDICARE

## 2024-04-16 ENCOUNTER — TELEPHONE (OUTPATIENT)
Dept: ELECTROPHYSIOLOGY | Facility: CLINIC | Age: 59
End: 2024-04-16
Payer: MEDICARE

## 2024-04-16 DIAGNOSIS — E53.8 B12 DEFICIENCY: ICD-10-CM

## 2024-04-16 RX ORDER — CYANOCOBALAMIN 1000 UG/ML
1000 INJECTION, SOLUTION INTRAMUSCULAR; SUBCUTANEOUS WEEKLY
Qty: 36 ML | Refills: 1 | Status: SHIPPED | OUTPATIENT
Start: 2024-04-16 | End: 2025-08-27

## 2024-04-17 ENCOUNTER — HOSPITAL ENCOUNTER (OUTPATIENT)
Dept: CARDIOLOGY | Facility: CLINIC | Age: 59
Discharge: HOME | End: 2024-04-17
Payer: MEDICARE

## 2024-04-17 ENCOUNTER — CLINICAL SUPPORT (OUTPATIENT)
Dept: CARDIOLOGY | Facility: HOSPITAL | Age: 59
End: 2024-04-17
Attending: INTERNAL MEDICINE
Payer: MEDICARE

## 2024-04-17 ENCOUNTER — OFFICE VISIT (OUTPATIENT)
Dept: ELECTROPHYSIOLOGY | Facility: CLINIC | Age: 59
End: 2024-04-17
Payer: MEDICARE

## 2024-04-17 VITALS — HEIGHT: 73 IN | HEART RATE: 80 BPM | WEIGHT: 248.88 LBS | BODY MASS INDEX: 32.98 KG/M2

## 2024-04-17 DIAGNOSIS — Z86.79 HISTORY OF VENTRICULAR TACHYCARDIA: ICD-10-CM

## 2024-04-17 DIAGNOSIS — I10 PRIMARY HYPERTENSION: Chronic | ICD-10-CM

## 2024-04-17 DIAGNOSIS — N18.32 STAGE 3B CHRONIC KIDNEY DISEASE: ICD-10-CM

## 2024-04-17 DIAGNOSIS — I42.0 DILATED CARDIOMYOPATHY: ICD-10-CM

## 2024-04-17 DIAGNOSIS — Z79.899 HIGH RISK MEDICATIONS (NOT ANTICOAGULANTS) LONG-TERM USE: ICD-10-CM

## 2024-04-17 DIAGNOSIS — I42.8 NICM (NONISCHEMIC CARDIOMYOPATHY): ICD-10-CM

## 2024-04-17 DIAGNOSIS — I50.42 CHRONIC COMBINED SYSTOLIC AND DIASTOLIC CONGESTIVE HEART FAILURE: Primary | Chronic | ICD-10-CM

## 2024-04-17 DIAGNOSIS — I47.20 VT (VENTRICULAR TACHYCARDIA): Primary | ICD-10-CM

## 2024-04-17 DIAGNOSIS — I13.0 HYPERTENSIVE CARDIOVASCULAR-RENAL DISEASE, STAGE 1-4 OR UNSPECIFIED CHRONIC KIDNEY DISEASE, WITH HEART FAILURE: ICD-10-CM

## 2024-04-17 DIAGNOSIS — Z79.01 ANTICOAGULATION MONITORING, INR RANGE 2-3: ICD-10-CM

## 2024-04-17 DIAGNOSIS — R94.2 ABNORMAL LUNG FUNCTION TEST: ICD-10-CM

## 2024-04-17 DIAGNOSIS — Z95.810 ICD (IMPLANTABLE CARDIOVERTER-DEFIBRILLATOR) IN PLACE: ICD-10-CM

## 2024-04-17 DIAGNOSIS — E66.3 OVERWEIGHT WITH BODY MASS INDEX (BMI) OF 28 TO 28.9 IN ADULT: ICD-10-CM

## 2024-04-17 DIAGNOSIS — I47.20 VT (VENTRICULAR TACHYCARDIA): ICD-10-CM

## 2024-04-17 DIAGNOSIS — Z95.811 LVAD (LEFT VENTRICULAR ASSIST DEVICE) PRESENT: Chronic | ICD-10-CM

## 2024-04-17 LAB
OHS QRS DURATION: 180 MS
OHS QTC CALCULATION: 535 MS

## 2024-04-17 PROCEDURE — 93005 ELECTROCARDIOGRAM TRACING: CPT | Mod: S$GLB,,, | Performed by: INTERNAL MEDICINE

## 2024-04-17 PROCEDURE — 99215 OFFICE O/P EST HI 40 MIN: CPT | Mod: S$GLB,,, | Performed by: INTERNAL MEDICINE

## 2024-04-17 PROCEDURE — 93010 ELECTROCARDIOGRAM REPORT: CPT | Mod: S$GLB,,, | Performed by: INTERNAL MEDICINE

## 2024-04-17 PROCEDURE — 93260 PRGRMG DEV EVAL IMPLTBL SYS: CPT

## 2024-04-17 PROCEDURE — 99999 PR PBB SHADOW E&M-EST. PATIENT-LVL IV: CPT | Mod: PBBFAC,,, | Performed by: INTERNAL MEDICINE

## 2024-04-17 PROCEDURE — 1159F MED LIST DOCD IN RCRD: CPT | Mod: CPTII,S$GLB,, | Performed by: INTERNAL MEDICINE

## 2024-04-17 PROCEDURE — 3008F BODY MASS INDEX DOCD: CPT | Mod: CPTII,S$GLB,, | Performed by: INTERNAL MEDICINE

## 2024-04-17 PROCEDURE — 93260 PRGRMG DEV EVAL IMPLTBL SYS: CPT | Mod: 26,,, | Performed by: INTERNAL MEDICINE

## 2024-04-17 PROCEDURE — 1160F RVW MEDS BY RX/DR IN RCRD: CPT | Mod: CPTII,S$GLB,, | Performed by: INTERNAL MEDICINE

## 2024-04-17 NOTE — PROGRESS NOTES
Reason for visit: Device management/ ICD reimplant     HPI:   Tim Richards is a 58 y.o. male with a significant cardiac history that includes non ischemic cardiomyopathy with severely reduced LVEF s/p ICD ( 2014) and HM 2 implant (3/2018) -- destination therapy, history of VT and VF    infected TV-ICD 2020.  SICD implanted 9/14/2020. Had shocks for VT 12/2021. We added (later increased) mexiletine.  He's been feeling great recently, but was hospitalized July - Sept 2022. Had VAD errors and needed it to be replaced. Upgraded to HM3.    Doing well. Joined a gym.   SICD functioning well.    My interpretation of today's ECG is wide complex rhythm with VAD noise    Past Medical History:   Diagnosis Date    A-fib     Anticoagulant long-term use     Atrial flutter 7/30/2022    CHF (congestive heart failure)     Class 1 obesity due to excess calories with serious comorbidity and body mass index (BMI) of 31.0 to 31.9 in adult     Class 1 obesity due to excess calories with serious comorbidity and body mass index (BMI) of 32.0 to 32.9 in adult     Dilated cardiomyopathy 1/10/2018    Disorder of kidney and ureter     CKD    Encounter for blood transfusion     Essential hypertension 8/28/2022    Gout     HTN (hypertension)     Hx of psychiatric care     ICD (implantable cardioverter-defibrillator) infection 7/1/2020    Psychiatric problem     Thyroid disease     Ventricular tachycardia (paroxysmal)         Social History     Socioeconomic History    Marital status:     Number of children: 3   Occupational History     Employer: [employer]    Tobacco Use    Smoking status: Former     Types: Vaping w/o nicotine    Smokeless tobacco: Never    Tobacco comments:     pt is quiting on his own - pt stated not qualified for program; 2/16 pt  quit on his own   Substance and Sexual Activity    Alcohol use: Not Currently     Comment: quit    Drug use: Never    Sexual activity: Not Currently     Partners: Female     Birth  control/protection: None     Comment: 10/5/17  with same partner 7 years    Social History Narrative    Disabled     Social Determinants of Health     Financial Resource Strain: Medium Risk (1/16/2024)    Overall Financial Resource Strain (CARDIA)     Difficulty of Paying Living Expenses: Somewhat hard   Food Insecurity: Food Insecurity Present (1/16/2024)    Hunger Vital Sign     Worried About Running Out of Food in the Last Year: Sometimes true     Ran Out of Food in the Last Year: Patient declined   Transportation Needs: Patient Declined (1/16/2024)    PRAPARE - Transportation     Lack of Transportation (Medical): Patient declined     Lack of Transportation (Non-Medical): Patient declined   Physical Activity: Insufficiently Active (1/16/2024)    Exercise Vital Sign     Days of Exercise per Week: 3 days     Minutes of Exercise per Session: 20 min   Stress: Stress Concern Present (1/16/2024)    Citizen of [place] [employer] Occupational Health - Occupational Stress Questionnaire     Feeling of Stress : Very much   Social Connections: Unknown (1/16/2024)    Social Connection and Isolation Panel [NHANES]     Frequency of Communication with Friends and Family: Once a week     Frequency of Social Gatherings with Friends and Family: Once a week     Active Member of Clubs or Organizations: No     Attends Club or Organization Meetings: Patient declined     Marital Status:    Housing Stability: Patient Declined (1/16/2024)    Housing Stability Vital Sign     Unable to Pay for Housing in the Last Year: Patient declined     Unstable Housing in the Last Year: Patient declined        Family History   Problem Relation Name Age of Onset    Hypertension Father [place] Richards     Diabetes Father Kolbymiryam Richards     Coronary artery disease Father [place] Maurice     Heart disease Father Kolby Richards         CHF    No Known Problems Mother      Cancer Sister Kelly Forde 54        breast CA    No Known Problems Brother      Anxiety  disorder Neg Hx      Depression Neg Hx      Dementia Neg Hx      Bipolar disorder Neg Hx      Suicide Neg Hx          Current Outpatient Medications   Medication Sig Dispense Refill    amiodarone (PACERONE) 200 MG Tab Take 2 tablets (400 mg total) by mouth once daily. 180 tablet 3    amLODIPine (NORVASC) 10 MG tablet TAKE 1 TABLET BY MOUTH EVERY DAY 90 tablet 3    ferrous gluconate (FERGON) 324 MG tablet Take 1 tablet (324 mg total) by mouth 2 (two) times daily with meals. 60 tablet 11    levothyroxine (SYNTHROID) 125 MCG tablet Take 1 tablet (125 mcg total) by mouth before breakfast. 30 tablet 11    magnesium oxide (MAG-OX) 400 mg (241.3 mg magnesium) tablet Take 1 tablet (400 mg total) by mouth 2 (two) times daily. 90 tablet 11    mexiletine (MEXITIL) 250 MG Cap Take 1 capsule (250 mg total) by mouth every 8 (eight) hours. 90 capsule 11    mirtazapine (REMERON) 30 MG tablet Take 1 tablet (30 mg total) by mouth every evening. 90 tablet 1    omega-3 acid ethyl esters (LOVAZA) 1 gram capsule Take 2 capsules (2 g total) by mouth 2 (two) times daily. 360 capsule 3    pantoprazole (PROTONIX) 40 MG tablet Take 1 tablet (40 mg total) by mouth daily with lunch. 90 tablet 3    potassium chloride SA (K-DUR,KLOR-CON) 20 MEQ tablet Take 1 tablet with every lasix 40 mg dose 60 tablet 5    torsemide (DEMADEX) 20 MG Tab Take 1 tablets (20mg) on Mondays. 30 tablet 11    warfarin (COUMADIN) 5 MG tablet Take 1-1.5 tablets (5-7.5 mg total) by mouth Daily. As directed by the [facility] 135 tablet 3    zolpidem (AMBIEN CR) 12.5 MG CR tablet Take 1 tablet (12.5 mg total) by mouth nightly as needed for Insomnia. 30 tablet 2    cyanocobalamin 1,000 mcg/mL injection INJECT 1 ML (1,000 MCG TOTAL) INTO THE SKIN ONCE A WEEK. FOR 24 DOSES (Patient not taking: Reported on 4/17/2024) 36 mL 1     No current facility-administered medications for this visit.        Constitutional: (-)fevers, (-)chills, (-)night sweats  HEENT: (-) headaches (-)  lightheadedness (-) blurry vision, (-) nose bleeds   Cardiovascular: (-)chest pain (-)paroxysmal nocturnal dyspnea (-)orthopnea, chest wall pain,   Respiratory: (-)shortness of breath, (+)dyspnea on exertion (-)hemoptysis  Gastrointestinal: (-)abdominal pain (-)nausea (-)vomiting (-)hematemesis    Musculoskeletal: (-)arthralgias (+)limited range of motion  Neurologic: (-)parasthesias (-)mood disorder (-)anxiety. Orthostatic LH sometimes.  Endo: (-)polyuria (-)polydipsia (-)heat/cold intolerance  Skin: (-)rash     Physical Exam:   Wt Readings from Last 3 Encounters:   04/17/24 112.9 kg (248 lb 14.4 oz)   03/07/24 108 kg (238 lb)   03/07/24 107 kg (236 lb)     Temp Readings from Last 3 Encounters:   03/07/24 98.8 °F (37.1 °C) (Oral)   12/07/23 98.4 °F (36.9 °C) (Oral)   10/24/23 97.9 °F (36.6 °C) (Oral)     BP Readings from Last 3 Encounters:   03/07/24 (!) 84/0   12/07/23 (!) 80/0   10/24/23 (!) 88/0     Pulse Readings from Last 3 Encounters:   04/17/24 80   03/07/24 64   10/24/23 (!) 30       Well developed, well nourished.   No distress.  Speaks in full sentences.  RRR'  CTA  no edema      Assessment/Plan:  Tim Rihcards is a 58 y.o. male with non ischemic cardiomyoapthy s/p HM II,  History of VT and VF.      #Hx of VT/VT - on amiodarone and mexiletine; has SICD  # NICM s/p LVAD HM3    Continue to follow SICD in device clinic. Monitoring lead given advisory from [facility].   f/u in pulm clinic re: PFTs  Return in 1 year, or earlier prn. Follow TSH, LFTs, PFTs.

## 2024-04-18 ENCOUNTER — OFFICE VISIT (OUTPATIENT)
Dept: PSYCHIATRY | Facility: CLINIC | Age: 59
End: 2024-04-18
Payer: MEDICARE

## 2024-04-18 ENCOUNTER — ANTI-COAG VISIT (OUTPATIENT)
Dept: CARDIOLOGY | Facility: CLINIC | Age: 59
End: 2024-04-18
Payer: MEDICARE

## 2024-04-18 ENCOUNTER — LAB VISIT (OUTPATIENT)
Dept: LAB | Facility: HOSPITAL | Age: 59
End: 2024-04-18
Attending: INTERNAL MEDICINE
Payer: MEDICARE

## 2024-04-18 VITALS — WEIGHT: 249.13 LBS | OXYGEN SATURATION: 95 % | BODY MASS INDEX: 32.87 KG/M2

## 2024-04-18 DIAGNOSIS — G47.00 INSOMNIA, UNSPECIFIED TYPE: ICD-10-CM

## 2024-04-18 DIAGNOSIS — F41.1 GENERALIZED ANXIETY DISORDER: ICD-10-CM

## 2024-04-18 DIAGNOSIS — F43.23 ADJUSTMENT DISORDER WITH MIXED ANXIETY AND DEPRESSED MOOD: Primary | ICD-10-CM

## 2024-04-18 DIAGNOSIS — Z95.811 LVAD (LEFT VENTRICULAR ASSIST DEVICE) PRESENT: ICD-10-CM

## 2024-04-18 DIAGNOSIS — Z79.01 LONG TERM (CURRENT) USE OF ANTICOAGULANTS: ICD-10-CM

## 2024-04-18 LAB
INR PPP: 2.8 (ref 0.8–1.2)
PROTHROMBIN TIME: 28.7 SEC (ref 9–12.5)

## 2024-04-18 PROCEDURE — 85610 PROTHROMBIN TIME: CPT | Performed by: INTERNAL MEDICINE

## 2024-04-18 PROCEDURE — 1159F MED LIST DOCD IN RCRD: CPT | Mod: CPTII,S$GLB,,

## 2024-04-18 PROCEDURE — 93793 ANTICOAG MGMT PT WARFARIN: CPT | Mod: S$GLB,,,

## 2024-04-18 PROCEDURE — 1160F RVW MEDS BY RX/DR IN RCRD: CPT | Mod: CPTII,S$GLB,,

## 2024-04-18 PROCEDURE — 99215 OFFICE O/P EST HI 40 MIN: CPT | Mod: S$GLB,,,

## 2024-04-18 PROCEDURE — 99999 PR PBB SHADOW E&M-EST. PATIENT-LVL III: CPT | Mod: PBBFAC,,,

## 2024-04-18 PROCEDURE — 3008F BODY MASS INDEX DOCD: CPT | Mod: CPTII,S$GLB,,

## 2024-04-18 PROCEDURE — 36415 COLL VENOUS BLD VENIPUNCTURE: CPT | Performed by: INTERNAL MEDICINE

## 2024-04-18 RX ORDER — ALPRAZOLAM 1 MG/1
1 TABLET ORAL 2 TIMES DAILY PRN
Qty: 60 TABLET | Refills: 5 | Status: SHIPPED | OUTPATIENT
Start: 2024-04-20 | End: 2024-10-17

## 2024-04-18 RX ORDER — MIRTAZAPINE 30 MG/1
30 TABLET, FILM COATED ORAL NIGHTLY
Qty: 90 TABLET | Refills: 1 | Status: SHIPPED | OUTPATIENT
Start: 2024-04-18

## 2024-04-18 NOTE — TELEPHONE ENCOUNTER
Called pt to review labs with him. Left a voice message to call me back.    Bridget was able to get pt via patient portal

## 2024-04-18 NOTE — PROGRESS NOTES
[facility]    2024 2:37 PM    Assessment/Plan:    Patient presents today with therapeutic INR.    Assessment of patient findings and chart review: no significant findings     Recommendation for patient's warfarin regimen: Continue current maintenance dose    Recommend repeat INR in 1 week  _________________________________________________________________    Tim Richards (58 y.o.) is followed by the [facility].    Anticoagulation Summary  As of 2024      INR goal:  2.0-3.0   TTR:  70.8% (5.5 y)   INR used for dosin.8 (2024)   Warfarin maintenance plan:  7.5 mg (5 mg x 1.5) every Thu; 5 mg (5 mg x 1) all other days   Weekly warfarin total:  37.5 mg   Plan last modified:  Radha Eugene, PharmD (10/18/2023)   Next INR check:  2024   Target end date:      Indications    LVAD (left ventricular assist device) present (Resolved) [Z95.811]  Anticoagulation monitoring  INR range 2-3 (Resolved) [Z79.01]                 Anticoagulation Episode Summary       INR check location:  Clinic Lab    Preferred lab:      Send INR reminders to:  FAUSTINA COUMADIN LVAD    Comments:  LVAD/ [facility] Lab/Lab [place] Results:[phone] [facility]# [id number] Phone: [phone]/dc1/ [facility] [phone],fax [phone]/ Bridge:NO(h/o bleeds) see 3/12 encounter for meter process          Anticoagulation Care Providers       Provider Role Specialty Phone number    Antonio Hadley Jr., MD Responsible Cardiology [phone]

## 2024-04-18 NOTE — PROGRESS NOTES
"Outpatient Psychiatry Follow-Up Visit   4/18/2024    Clinical Status of Patient:  Outpatient (Ambulatory)  The patient location is: [place]      Chief Complaint:  Tim Richards is a 58 y.o. male who presents today for follow-up of anxiety.  Met with patient.      Interval History and Content of Current Session 04/18/2024:  Pt is A+Ox 4.  Patients mood is "not the best", affect appears blunted, sad. Pts thought process is normal and logical.  Pts speech is normal tone, normal rate, normal pitch, normal volume  Cooperative, normal eye contact, no psychomotor retardation.  Pt is calmly seated in chair during interview.     Patient states that he has not been doing well since our previous appointment. Endorses increased marital stress. Patient states that he is interested in talk therapy at this time.  continues to take Remeron 30MG QHS and Xanax 1 MG BID PRN anxiety (patient takes BID approximately 2 times a week). We have extensively discussed minimizing Xanax use.  Patient endorses multiple poor coping skills and an abnormal sleep wake cycle. Patient will go to bed at 3:00 in the morning and sleep until noon.    Pt reports taking medications as prescribed and behaving appropriately during interview today.  Denies SI/HI/AVH. Denies side effects of medications.  Pt reports sleeping OK and normal appetite.   Denies recreational drug use. Pt reports 0 drinks per week, denies tobacco use, denies Vaping, denies Caffeine.        Interim Events: 1/16/2024  Pt is A+Ox 4.  Patients mood is "ok", affect appears congruent and appropriate. Pts thought process is normal and logical.  Pts speech is normal tone, normal rate, normal pitch, normal volume   Linear and logical, friendly and cooperative, normal eye contact, no psychomotor retardation.  Pt is calmly seated in chair during interview.     Patient states that he's been doing OK since her previous appointment. Continues to take Remeron 30MG QHS, Xanax 0.5 to 1MG BID " "PRN panic. Patient states that he went to physical therapy for approximately 2 months and regained some lower body strength. Patient states that this has assisted him in ambulating with minimal assistance. Patient states that he has fallen a couple times since our last appointment due to tripping on things around the house. Patient states that his vision is poor and he has a difficult time seeing at night. States falls are not related to Xanax use. We discussed placing Night Lights around the house to better see.    Pt reports taking medications as prescribed and behaving appropriately during interview today.  Denies SI/HI/AVH. Denies side effects of medications.  Pt reports sleeping well and normal appetite.   Denies recreational drug use. Pt reports 0 drinks per week, denies tobacco use, denies Vaping, denies Caffeine.        Interim Events: 10/19/2023  Pt is A+Ox 4.  Patients mood is "good", affect appears congruent and appropriate. Pts thought process is normal and logical.  Pts speech is normal tone, normal rate, normal pitch, normal volume   Linear and logical, friendly and cooperative, normal eye contact, no psychomotor retardation.  Pt is calmly seated in chair during interview. Pt is casually dressed and well groomed.       Pt states that he has been doing pretty good since his last visit. Pt is taking Remeron 30 mg QHS and Xanax 0.5-1 mg BID prn (takes 1 pill twice daily). Pt's PCP (Dr. Daniel) recently prescribed him Ambien 12.5 mg nightly prn for insomnia. Denies any side effects from the medications. He reports that he has increased anxiety with driving especially with traffic. He states that he did PT for 3 months for atrophy which has helped a lot with his falls. Pt has had 3-4 falls in 6 months. He reports that his cardiologist is advising that he get on the heart transplant list which he is still debating. He states that he's had to have 2 heart pumps replaced in the past and if he has more issues " "with this he will get on the transplant list. Pt's support system includes his wife. Pt would like to continue on his current medication regimen.     Pt is having stress with his daughters.     Pt reports  taking medications as prescribed and behaving appropriately during interview today.  Denies SI/HI/AVH. Denies side effects of medications.  Pt reports sleeping normal and normal appetite.   Denies recreational drug use. Pt reports 0 drinks per week, denies tobacco use, denies Vaping, denies Caffeine.      Prior visit :  Psych Interview 03/17/2023:   Tim Richards is a 57 y.o. male with past psychiatric history of ALONZO  presented to for initial evaluation and treatment for anxiety .     Pt is A+Ox 4.  Patients mood is "ok", affect appears congruent and appropriate. Pts thought process is normal and logical.  Pts speech is normal tone, normal rate, normal pitch, normal volume   Linear and logical, friendly and cooperative, good eye contact, no psychomotor retardation.  Pt is calmly seated in chair during interview.  Pt is casually dressed and well groomed.  Pt has LVAD bag next to him during clinic visit.       Patient was previously being seen by Dr Krueger for ALONZO, currently taking Remeron 30 and Xanax 0.5mg - 1mg BID daily for panic. Patient has an LVAD and is currently doing OK. Patient states that in 2022 he was hospitalized for three months due to heart failure complications. States that after hospitalization he went through outpatient rehab, states that he had to learn to walk again. Patient states that his support system consists of wife and LVAD team.  States that he has fell two times within the last month, states that both times he fell was due to tripping over his LVAD bag. States that he was not using Xanax at the time of the fall.  Patient states that takes Xanax 1mg two times per week for panic. Patient states that when he takes Xanax he stays seated for 1-2 hours to prevent.       Pt states " that his biggest stressor includes possibly losing his disability. Patient states that disability needs to be renewed every six months, states that the disability office has been more adamant about not continuing his disability. Patient states that this has increase in his anxiety.  Pt states that if he loses his disability he does not know how he will make ends meet. Patient states that due to his medical conditions he is unable to work at this time.     Denies prior hx of psychiatric hospitalizations. Denies hx of suicide attempts. Pt denies hx self harm. Pt denies hx hallucinations.  Pt denies hx of eating disorders.   Pt endorses hx trauma - . Denies physical/sexual abuse. Pt denies symptoms including nightmares, hypervigilance, flashbacks, avoidance behaviors, and disassociation.     Reports depression today as 3/10, and anxiety as 2/10.     Reports sleeping 9 hrs per night, and poor appetite.   Denies SI/HI/AVH. Denies side effects of medications.  Pt states that there support consists of wife  Denies recreational drug use. Pt reports 0 drinks per week, denies tobacco use, denies Vaping, denies Caffeine.       Current Medication:  Xanax 0.5-1 mg Daily      Past meds   Paroxetine   Zoloft      DX:  Pt denies hx symptoms/episodes of miguel.     Admits to symptoms of anxiety including excessive anxiety/worry/fear, more days than not, about numerous issues, difficulty controlling the worry, over thinking, rumination, restlessness, poor concentration, fatigue, and increased irritability. Denies panic attacks at this time.      Past Psychiatric History:   Previous Psychiatric Hospitalizations: NO  Previous Medication Trials: YES:      History of psychotherapy:  NO  Previous Suicide Attempts: NO  History of Violence:  NO  History of physical/sexual abuse: NO  Outpatient psychiatric provider(past): NO     Substance Abuse History:   Tobacco: NO  Alcohol: NO  Illicit Substances: NO  Detox/Rehab: NO     Neurological  History:   Seizures: NO  Head trauma: YES: falls        Family Psychiatric History: No  Social History:  Developmental/Childhood:Achieved all developmental milestones timely  *Education:High School Diploma  Employment Status/Finances:Disabled   Relationship Status/Sexual Orientation: : Relationship intact  Children: 3  Housing Status: Home    history:  NO  Access to gun: YES:      [employer]: Worship  Recreational activities: watch TV, work on truck  Person patient is closest to/confides in: Wife     Legal History:   Past Charges/Incarcerations:  No      Review of Systems     Review of Systems   Constitutional:  Negative for weight loss.   HENT:  Negative for tinnitus.    Eyes:  Negative for blurred vision.   Respiratory:  Negative for cough and shortness of breath.    Cardiovascular:  Negative for chest pain.   Gastrointestinal:  Negative for abdominal pain.   Genitourinary:  Negative for dysuria.   Musculoskeletal:  Negative for back pain and neck pain.   Skin:  Negative for rash.   Neurological:  Negative for dizziness, seizures and weakness.   Psychiatric/Behavioral:  Positive for depression. Negative for hallucinations, memory loss, substance abuse and suicidal ideas. The patient is nervous/anxious. The patient does not have insomnia.        Psychiatric Review Of Systems - Is patient experiencing or having changes in:  sleep: no  appetite: no  weight: no  energy/anergy: no  interest/pleasure/anhedonia: yes  somatic symptoms: no  libido: no  anxiety/panic: yes  guilty/hopelessness: no  concentration: no  S.I.B.s/risky behavior: no  Irritability: no  Racing thoughts: no  Impulsive behaviors: no  Paranoia: no  AVH: no      Past Medical, Family and Social History: The patient's past medical, family and social history have been reviewed and updated as appropriate within the electronic medical record - see encounter notes.      Current Medications:   Medication List with Changes/Refills   Current  Medications    AMIODARONE (PACERONE) 200 MG TAB    Take 2 tablets (400 mg total) by mouth once daily.    AMLODIPINE (NORVASC) 10 MG TABLET    TAKE 1 TABLET BY MOUTH EVERY DAY    CYANOCOBALAMIN 1,000 MCG/ML INJECTION    INJECT 1 ML (1,000 MCG TOTAL) INTO THE SKIN ONCE A WEEK. FOR 24 DOSES    FERROUS GLUCONATE (FERGON) 324 MG TABLET    Take 1 tablet (324 mg total) by mouth 2 (two) times daily with meals.    LEVOTHYROXINE (SYNTHROID) 125 MCG TABLET    Take 1 tablet (125 mcg total) by mouth before breakfast.    MAGNESIUM OXIDE (MAG-OX) 400 MG (241.3 MG MAGNESIUM) TABLET    Take 1 tablet (400 mg total) by mouth 2 (two) times daily.    MEXILETINE (MEXITIL) 250 MG CAP    Take 1 capsule (250 mg total) by mouth every 8 (eight) hours.    OMEGA-3 ACID ETHYL ESTERS (LOVAZA) 1 GRAM CAPSULE    Take 2 capsules (2 g total) by mouth 2 (two) times daily.    PANTOPRAZOLE (PROTONIX) 40 MG TABLET    Take 1 tablet (40 mg total) by mouth daily with lunch.    POTASSIUM CHLORIDE SA (K-DUR,KLOR-CON) 20 MEQ TABLET    Take 1 tablet with every lasix 40 mg dose    TORSEMIDE (DEMADEX) 20 MG TAB    Take 1 tablets (20mg) on Mondays.    WARFARIN (COUMADIN) 5 MG TABLET    Take 1-1.5 tablets (5-7.5 mg total) by mouth Daily. As directed by the [facility]    ZOLPIDEM (AMBIEN CR) 12.5 MG CR TABLET    Take 1 tablet (12.5 mg total) by mouth nightly as needed for Insomnia.    ZOLPIDEM (AMBIEN) 10 MG TAB    Take 1 tablet (10 mg total) by mouth nightly as needed (insomnia).   Changed and/or Refilled Medications    Modified Medication Previous Medication    ALPRAZOLAM (XANAX) 1 MG TABLET ALPRAZolam (XANAX) 1 MG tablet       Take 1 tablet (1 mg total) by mouth 2 (two) times daily as needed for Anxiety.    Take 1 tablet (1 mg total) by mouth 2 (two) times daily as needed for Anxiety.    MIRTAZAPINE (REMERON) 30 MG TABLET mirtazapine (REMERON) 30 MG tablet       Take 1 tablet (30 mg total) by mouth every evening.    Take 1 tablet (30 mg total) by mouth every  "evening.         Allergies:   Review of patient's allergies indicates:   Allergen Reactions    Lisinopril Anaphylaxis    Hydralazine     Hydralazine analogues      Chronic constipation, impotence, dizziness         Vitals   Vitals:            Labs/Imaging/Studies:   Recent Results (from the past 48 hour(s))   Rhythm strip    Collection Time: 04/17/24 10:07 AM   Result Value Ref Range    QRS Duration 180 ms    [facility] QTC Calculation 535 ms   Protime-INR    Collection Time: 04/18/24  2:06 PM   Result Value Ref Range    Prothrombin Time 28.7 (H) 9.0 - 12.5 sec    INR 2.8 (H) 0.8 - 1.2        No results found for: "PHENYTOIN", "PHENOBARB", "VALPROATE", "CBMZ"    Compliance: yes    Side effects: None    Risk Parameters:  Patient reports no suicidal ideation  Patient reports no homicidal ideation  Patient reports no self-injurious behavior  Patient reports no violent behavior    Exam (detailed: at least 9 elements; comprehensive: all 15 elements)   Constitutional  Vitals:  Most recent vital signs, dated less than 90 days prior to this appointment, were reviewed.   Vitals:    04/18/24 1310   SpO2: 95%   Weight: 113 kg (249 lb 1.9 oz)          General:  unremarkable, age appropriate     Musculoskeletal  Muscle Strength/Tone:  no spasicity, no rigidity, no cogwheeling, no flaccidity, no paratonia, no dyskinesia, no dystonia, no tremor, no tic, no choreoathetosis, no atrophy   Gait & Station:  non-ataxic     Psychiatric  Speech:  no latency; no press   Mood & Affect:  steady  congruent and appropriate   Thought Process:  normal and logical   Associations:  intact   Thought Content:  normal, no suicidality, no homicidality, delusions, or paranoia   Insight:  intact, has awareness of illness   Judgement: behavior is adequate to circumstances, age appropriate   Orientation:  grossly intact, person, place, situation, time/date   Memory: intact for content of interview, grossly intact, memory >3 objects at five mins, able to remember " recent events- Yes, able to remember remote events- Yes   Language: grossly intact, able to name, able to repeat   Attention Span & Concentration:  able to focus, completed tasks   Fund of Knowledge:  intact and appropriate to age and level of education, familiar with aspects of current personal life     Assessment and Diagnosis   Status/Progress: Based on the examination today, the patient's problem(s) is/are well controlled.  New problems have not been presented today.   Co-morbidities are complicating management of the primary condition.     General Impression:      ICD-10-CM ICD-9-CM   1. Adjustment disorder with mixed anxiety and depressed mood  F43.23 309.28   2. Insomnia, unspecified type  G47.00 780.52   3. Generalized anxiety disorder  F41.1 300.02           Intervention/Counseling/Treatment Plan      Mood   Continue Remeron 30mg QHS - targeting anxiety, depression, and insomnia  Continue Xanax 1mg BID PRN anxiety/panic  -Okay to continue short course of Xanax.  Long-term goal is to not continue this medication.   - Patient takes Xanax 1mg BID approximately 2 times a week   -Concern about starting patient on antidepressants due to possible effect on clotting factors. Pt has been on remeron for years and has done well, will continue at this time.    -Benefits Outweigh the Risk of staying on Xanax.    - Patient was extensively educated in the risks of taking Xanax with a history of falls. Patient states that he will not Ambulate two hours after taking Xanax.  - Pt is taking Ambien 12.5mg PRN insomnia.  This is not being prescribed by myself.  Pt extensively educated to not combine Ambien and Xanax.  Pt is amendable      Referral for talk therapy placed.  Pt has many poor coping skills that will not be fixed with medications.        Discussed diagnosis, risks and benefits of proposed treatment above vs alternative treatments vs no treatment, and potential side effects of these treatments, and the inherent  unpredictability of individual response to treatment.  The patient expresses understanding and gives informed consent to pursue treatment.  The potential benefits outweigh the potential risks. Patient has no other questions. Risks/adverse effects discussed at this time including but not limited to: GI side effects, sexual dysfunction, activation vs sedation, triggering of suicidal thoughts, and serotonin syndrome.   Discussed with patient, the potential adverse effects of Benzodiazepines, including, but not limited to, drowsiness, dizziness, risk of falls, and abuse potential. Counseled patient on avoiding alcohol, while using this medication, due to the risk of respiratory depression. Patient instructed not to operate any heavy machinery or drive a vehicle while on this medication. Informed patient of the risks of continuous benzodiazepine use, including tolerance, dependence and withdrawals that may be life threatening, upon abrupt cessation. Also advised patient not to take benzodiazepines with opiates and/or other sedatives. The patient expresses an understanding of the above as well as the inherent unpredictability of said treatment.  The patient agrees that, currently, the benefits outweigh the risks, and would like to pursue said treatment at this time.      Serotonin syndrome   Mental status changes can include anxiety, restlessness, disorientation, and agitated delirium.    Autonomic manifestations can include diaphoresis, tachycardia, hyperthermia, hypertension, vomiting, and diarrhea   Neuromuscular hyperactivity can manifest as tremor, myoclonus, hyperreflexia, rigidity, hyperthermia, seizure, and bilateral Babinski sign.   Pt was informed that if they experience any of these symptoms to go the ED.     Difficulty Sleeping Behavioral Modification:  Implement stimulus control: [place] bedroom for sleep only. Leave bedroom when frustrated from not sleeping. Engage in relaxation before returning. Engage in  activities during the day. AVOID >7-8 h time in bed  Avoid clock watching  Avoid thinking/worrying about sleep when trying to fall asleep  Limit caffeinated consumption  Make sure the bedroom is dark, quiet and cool    Safety Plan   Patient voices understanding and agreement with this plan  Provided crisis numbers  Encouraged patient to keep future appointments.  Instructed patient to call or message with questions or concerns  In the event of an emergency, including suicidal ideation, patient was advised to go to the emergency room and/or call 911    Return to Clinic: 3 months    Psychotherapy:  Target symptoms: anxiety   Why chosen therapy is appropriate versus another modality: relevant to diagnosis, evidence based practice  Outcome monitoring methods: self-report, observation  Therapeutic intervention type: insight oriented psychotherapy, interactive psychotherapy  Topics discussed/themes: relationships difficulties, stress related to medical comorbidities, difficulty managing affect in interpersonal relationships, building skills sets for symptom management  The patient's response to the intervention is guarded. The patient's progress toward treatment goals is fair.   Duration of intervention: 15 minutes.    Total face to face time: 40 min  Total time (chart review, patient contact, documentation): 45 min  Pt required extensive education    A diagnostic psychiatric evaluation was performed and responsiveness to treatment was assessed.  The patient demonstrates adequate ability/capacity to respond to treatment.    Osmar Mejia PA-C    *This note has been prepared using a combination of a dictation device and typing.  It has been checked for errors but some errors may still exist within the note as a result of speech recognition errors and/or typographical errors.

## 2024-04-19 ENCOUNTER — PATIENT MESSAGE (OUTPATIENT)
Dept: PSYCHIATRY | Facility: CLINIC | Age: 59
End: 2024-04-19
Payer: MEDICARE

## 2024-04-22 ENCOUNTER — PATIENT MESSAGE (OUTPATIENT)
Dept: ELECTROPHYSIOLOGY | Facility: CLINIC | Age: 59
End: 2024-04-22
Payer: MEDICARE

## 2024-04-22 ENCOUNTER — PATIENT MESSAGE (OUTPATIENT)
Dept: TRANSPLANT | Facility: CLINIC | Age: 59
End: 2024-04-22
Payer: MEDICARE

## 2024-04-22 ENCOUNTER — PATIENT MESSAGE (OUTPATIENT)
Dept: PSYCHIATRY | Facility: CLINIC | Age: 59
End: 2024-04-22
Payer: MEDICARE

## 2024-04-25 ENCOUNTER — LAB VISIT (OUTPATIENT)
Dept: LAB | Facility: HOSPITAL | Age: 59
End: 2024-04-25
Attending: INTERNAL MEDICINE
Payer: MEDICARE

## 2024-04-25 ENCOUNTER — ANTI-COAG VISIT (OUTPATIENT)
Dept: CARDIOLOGY | Facility: CLINIC | Age: 59
End: 2024-04-25
Payer: MEDICARE

## 2024-04-25 DIAGNOSIS — Z95.811 HEART REPLACED BY HEART ASSIST DEVICE: ICD-10-CM

## 2024-04-25 LAB
INR PPP: 2.9 (ref 0.8–1.2)
PROTHROMBIN TIME: 29.4 SEC (ref 9–12.5)

## 2024-04-25 PROCEDURE — 93793 ANTICOAG MGMT PT WARFARIN: CPT | Mod: S$GLB,,,

## 2024-04-25 PROCEDURE — 36415 COLL VENOUS BLD VENIPUNCTURE: CPT | Performed by: INTERNAL MEDICINE

## 2024-04-25 PROCEDURE — 85610 PROTHROMBIN TIME: CPT | Performed by: INTERNAL MEDICINE

## 2024-04-25 NOTE — PROGRESS NOTES
[facility]    2024 3:58 PM    Assessment/Plan:    Patient presents today with therapeutic INR.    Assessment of patient findings and chart review: no significant findings     Recommendation for patient's warfarin regimen: Continue current maintenance dose    Recommend repeat INR in 1 week  _________________________________________________________________    Tim Richards (58 y.o.) is followed by the [facility].    Anticoagulation Summary  As of 2024      INR goal:  2.0-3.0   TTR:  70.9% (5.5 y)   INR used for dosin.9 (2024)   Warfarin maintenance plan:  7.5 mg (5 mg x 1.5) every Thu; 5 mg (5 mg x 1) all other days   Weekly warfarin total:  37.5 mg   Plan last modified:  Radha uEgene, PharmD (10/18/2023)   Next INR check:  2024   Target end date:      Indications    LVAD (left ventricular assist device) present (Resolved) [Z95.811]  Anticoagulation monitoring  INR range 2-3 (Resolved) [Z79.01]                 Anticoagulation Episode Summary       INR check location:  Clinic Lab    Preferred lab:      Send INR reminders to:  [facility]    Comments:  LVAD/ [facility] Lab/Lab [place] Results:[phone] [facility]# [id number] Phone: [phone] [phone]/dc1/ [facility] [phone],fax [phone]/ Bridge:NO(h/o bleeds) see 3/12 encounter for meter process          Anticoagulation Care Providers       Provider Role Specialty Phone number    Antonio Hadley Jr., MD Responsible Cardiology [phone]

## 2024-05-02 ENCOUNTER — ANTI-COAG VISIT (OUTPATIENT)
Dept: CARDIOLOGY | Facility: CLINIC | Age: 59
End: 2024-05-02
Payer: MEDICARE

## 2024-05-02 ENCOUNTER — LAB VISIT (OUTPATIENT)
Dept: LAB | Facility: HOSPITAL | Age: 59
End: 2024-05-02
Attending: INTERNAL MEDICINE
Payer: MEDICARE

## 2024-05-02 DIAGNOSIS — Z79.01 LONG TERM (CURRENT) USE OF ANTICOAGULANTS: ICD-10-CM

## 2024-05-02 DIAGNOSIS — Z95.811 LVAD (LEFT VENTRICULAR ASSIST DEVICE) PRESENT: ICD-10-CM

## 2024-05-02 LAB
INR PPP: 3.1 (ref 0.8–1.2)
PROTHROMBIN TIME: 31.3 SEC (ref 9–12.5)

## 2024-05-02 PROCEDURE — 85610 PROTHROMBIN TIME: CPT | Performed by: INTERNAL MEDICINE

## 2024-05-02 PROCEDURE — 93793 ANTICOAG MGMT PT WARFARIN: CPT | Mod: S$GLB,,,

## 2024-05-02 PROCEDURE — 36415 COLL VENOUS BLD VENIPUNCTURE: CPT | Performed by: INTERNAL MEDICINE

## 2024-05-03 NOTE — PROGRESS NOTES
[facility]    05/03/2024 8:24 AM    Assessment/Plan:    Patient presents today with supratherapeutic INR.    Assessment of patient findings and chart review: no significant findings     Recommendation for patient's warfarin regimen: Lower dose today to 2.5mg then resume current maintenance dose    Recommend repeat INR in 1 week  _________________________________________________________________    Tim Richards (58 y.o.) is followed by the [facility].    Anticoagulation Summary  As of 5/2/2024      INR goal:  2.0-3.0   TTR:  70.8% (5.6 y)   INR used for dosing:  3.1 (5/2/2024)   Warfarin maintenance plan:  7.5 mg (5 mg x 1.5) every Thu; 5 mg (5 mg x 1) all other days   Weekly warfarin total:  37.5 mg   Plan last modified:  Radha Eugene, PharmD (10/18/2023)   Next INR check:  5/9/2024   Target end date:      Indications    LVAD (left ventricular assist device) present (Resolved) [Z95.811]  Anticoagulation monitoring  INR range 2-3 (Resolved) [Z79.01]                 Anticoagulation Episode Summary       INR check location:  Clinic Lab    Preferred lab:      Send INR reminders to:  [facility] LVAD    Comments:  LVAD/ [facility] Lab/Lab [place] Results:[phone] [facility]# [id number] Phone: [phone] [phone]/dc1/16/23 [facility] -[phone],fax [phone]/ Bridge:NO(h/o bleeds) see 3/12 encounter for meter process          Anticoagulation Care Providers       Provider Role Specialty Phone number    Antonio Hadley Jr., MD Responsible Cardiology [phone]

## 2024-05-09 ENCOUNTER — LAB VISIT (OUTPATIENT)
Dept: LAB | Facility: HOSPITAL | Age: 59
End: 2024-05-09
Attending: INTERNAL MEDICINE
Payer: MEDICARE

## 2024-05-09 DIAGNOSIS — Z95.811 LVAD (LEFT VENTRICULAR ASSIST DEVICE) PRESENT: ICD-10-CM

## 2024-05-09 DIAGNOSIS — Z79.01 LONG TERM (CURRENT) USE OF ANTICOAGULANTS: ICD-10-CM

## 2024-05-09 LAB
INR PPP: 5.7 (ref 0.8–1.2)
PROTHROMBIN TIME: 56.1 SEC (ref 9–12.5)

## 2024-05-09 PROCEDURE — 36415 COLL VENOUS BLD VENIPUNCTURE: CPT | Performed by: INTERNAL MEDICINE

## 2024-05-09 PROCEDURE — 85610 PROTHROMBIN TIME: CPT | Performed by: INTERNAL MEDICINE

## 2024-05-10 ENCOUNTER — ANTI-COAG VISIT (OUTPATIENT)
Dept: CARDIOLOGY | Facility: CLINIC | Age: 59
End: 2024-05-10
Payer: MEDICARE

## 2024-05-10 ENCOUNTER — LAB VISIT (OUTPATIENT)
Dept: LAB | Facility: HOSPITAL | Age: 59
End: 2024-05-10
Attending: INTERNAL MEDICINE
Payer: MEDICARE

## 2024-05-10 DIAGNOSIS — Z79.01 LONG TERM (CURRENT) USE OF ANTICOAGULANTS: ICD-10-CM

## 2024-05-10 DIAGNOSIS — Z95.811 LVAD (LEFT VENTRICULAR ASSIST DEVICE) PRESENT: ICD-10-CM

## 2024-05-10 LAB
INR PPP: 4.3 (ref 0.8–1.2)
PROTHROMBIN TIME: 42.9 SEC (ref 9–12.5)

## 2024-05-10 PROCEDURE — 93793 ANTICOAG MGMT PT WARFARIN: CPT | Mod: S$GLB,,,

## 2024-05-10 PROCEDURE — 85610 PROTHROMBIN TIME: CPT | Performed by: INTERNAL MEDICINE

## 2024-05-10 PROCEDURE — 36415 COLL VENOUS BLD VENIPUNCTURE: CPT | Performed by: INTERNAL MEDICINE

## 2024-05-10 NOTE — PROGRESS NOTES
[facility]    05/10/2024 3:02 PM    Assessment/Plan:    Patient presents today with supratherapeutic INR.    Assessment of patient findings and chart review: reports wine intake on Wednesday; he held dose yesterday and had kale smoothie; patient suspected lab error so had repeat drawn today which was still elevated but improved some    Recommendation for patient's warfarin regimen: Hold dose today then resume current maintenance dose    Recommend repeat INR Monday  _________________________________________________________________    Tim Richards (58 y.o.) is followed by the [facility].    Anticoagulation Summary  As of 5/10/2024      INR goal:  2.0-3.0   TTR:  70.6% (5.6 y)   INR used for dosin.3 (5/10/2024)   Warfarin maintenance plan:  7.5 mg (5 mg x 1.5) every Thu; 5 mg (5 mg x 1) all other days   Weekly warfarin total:  37.5 mg   Plan last modified:  Radha Eugene, PharmD (10/18/2023)   Next INR check:  2024   Target end date:      Indications    LVAD (left ventricular assist device) present (Resolved) [Z95.811]  Anticoagulation monitoring  INR range 2-3 (Resolved) [Z79.01]                 Anticoagulation Episode Summary       INR check location:  Clinic Lab    Preferred lab:      Send INR reminders to:  [facility] COUMADIN LVAD    Comments:  LVAD/ [facility] Lab/Lab [place] Results:[phone] Acct# [id number] Phone: [phone]/dc1/ [facility] [phone],fax [phone]/ Bridge:NO(h/o bleeds) see 3/12 encounter for meter process          Anticoagulation Care Providers       Provider Role Specialty Phone number    Antonio Hadley Jr., MD [facility] Cardiology [phone]

## 2024-05-10 NOTE — PROGRESS NOTES
Pt confirmed taking warfarin dose of 5mg on 5/2 and 2.5mg on 5/3 then resumed warfarin per calendar.. Pt denies any changes to medication, health, or diet. He  denies any signs of bleeding and was reminded to contact clinic with any changes that may affect warfarin therapy.     Pt advised to go to ED for any signs of bleeding. Pt states that he believes it is a lab error because nothing has changed for him.     Update: Pt going to [facility] for STAT INR

## 2024-05-10 NOTE — PROGRESS NOTES
Patient reports he held Warfarin 5/9/2024, took correct Warfarin dose all other days, strawberry salad with grapefruit 5/8/24, 2 glasses of red wine 5/8/24, kale smoothie 5/9/2024, no other changes reported.

## 2024-05-13 NOTE — PROGRESS NOTES
Pt sent [employer] message requesting to have today's INR rescheduled to 05/14/24, INR scheduled.

## 2024-05-15 ENCOUNTER — ANTI-COAG VISIT (OUTPATIENT)
Dept: CARDIOLOGY | Facility: CLINIC | Age: 59
End: 2024-05-15
Payer: MEDICARE

## 2024-05-15 ENCOUNTER — LAB VISIT (OUTPATIENT)
Dept: LAB | Facility: HOSPITAL | Age: 59
End: 2024-05-15
Attending: INTERNAL MEDICINE
Payer: MEDICARE

## 2024-05-15 DIAGNOSIS — Z79.01 LONG TERM (CURRENT) USE OF ANTICOAGULANTS: ICD-10-CM

## 2024-05-15 DIAGNOSIS — Z95.811 LVAD (LEFT VENTRICULAR ASSIST DEVICE) PRESENT: ICD-10-CM

## 2024-05-15 LAB
INR PPP: 1.8 (ref 0.8–1.2)
PROTHROMBIN TIME: 18.7 SEC (ref 9–12.5)

## 2024-05-15 PROCEDURE — 85610 PROTHROMBIN TIME: CPT | Performed by: INTERNAL MEDICINE

## 2024-05-15 PROCEDURE — 36415 COLL VENOUS BLD VENIPUNCTURE: CPT | Performed by: INTERNAL MEDICINE

## 2024-05-15 PROCEDURE — 93793 ANTICOAG MGMT PT WARFARIN: CPT | Mod: S$GLB,,,

## 2024-05-15 NOTE — PROGRESS NOTES
[facility]    05/15/2024 2:04 PM    Assessment/Plan:    Patient presents today with subtherapeutic  INR.    Assessment of patient findings and chart review: INR now below goal after holding last week for elevated INRs    Recommendation for patient's warfarin regimen: Continue current maintenance dose    Recommend repeat INR Monday   _________________________________________________________________    Tim Richards (58 y.o.) is followed by the [facility].    Anticoagulation Summary  As of 5/15/2024      INR goal:  2.0-3.0   TTR:  70.5% (5.6 y)   INR used for dosin.8 (5/15/2024)   Warfarin maintenance plan:  7.5 mg (5 mg x 1.5) every Thu; 5 mg (5 mg x 1) all other days   Weekly warfarin total:  37.5 mg   Plan last modified:  Radha Eugene, PharmD (10/18/2023)   Next INR check:  2024   Target end date:      Indications    LVAD (left ventricular assist device) present (Resolved) [Z95.811]  Anticoagulation monitoring  INR range 2-3 (Resolved) [Z79.01]                 Anticoagulation Episode Summary       INR check location:  Clinic Lab    Preferred lab:      Send INR reminders to:  [facility] COUMADIN LVAD    Comments:  LVAD/ [facility] Lab/Lab [place] Results:[phone] [facility] [id number] Phone: [phone] [phone]/dc1/ [facility] -[phone],fax [phone]/ Bridge:NO(h/o bleeds) see 3/12 encounter for meter process          Anticoagulation Care Providers       Provider Role Specialty Phone number    Antonio Hadley Jr., MD Responsible Cardiology [phone]

## 2024-05-15 NOTE — PROGRESS NOTES
05/15/2024-Spoke to pt regarding 5/14/24 missed INR. Pt stated he will go for INR today, INR rescheduled.

## 2024-05-21 ENCOUNTER — LAB VISIT (OUTPATIENT)
Dept: LAB | Facility: HOSPITAL | Age: 59
End: 2024-05-21
Attending: INTERNAL MEDICINE
Payer: MEDICARE

## 2024-05-21 ENCOUNTER — ANTI-COAG VISIT (OUTPATIENT)
Dept: CARDIOLOGY | Facility: CLINIC | Age: 59
End: 2024-05-21
Payer: MEDICARE

## 2024-05-21 DIAGNOSIS — Z95.811 LVAD (LEFT VENTRICULAR ASSIST DEVICE) PRESENT: ICD-10-CM

## 2024-05-21 DIAGNOSIS — Z79.01 LONG TERM (CURRENT) USE OF ANTICOAGULANTS: ICD-10-CM

## 2024-05-21 LAB
INR PPP: 2.1 (ref 0.8–1.2)
PROTHROMBIN TIME: 22.4 SEC (ref 9–12.5)

## 2024-05-21 PROCEDURE — 93793 ANTICOAG MGMT PT WARFARIN: CPT | Mod: S$GLB,,,

## 2024-05-21 PROCEDURE — 85610 PROTHROMBIN TIME: CPT | Performed by: INTERNAL MEDICINE

## 2024-05-21 PROCEDURE — 36415 COLL VENOUS BLD VENIPUNCTURE: CPT | Performed by: INTERNAL MEDICINE

## 2024-05-21 NOTE — PROGRESS NOTES
[facility]    2024 1:34 PM    Assessment/Plan:    Patient presents today with therapeutic INR.    Assessment of patient findings and chart review: no significant findings     Recommendation for patient's warfarin regimen: Continue current maintenance dose    Recommend repeat INR Friday  _________________________________________________________________    Tim Richards (58 y.o.) is followed by the [facility].    Anticoagulation Summary  As of 2024      INR goal:  2.0-3.0   TTR:  70.4% (5.6 y)   INR used for dosin.1 (2024)   Warfarin maintenance plan:  7.5 mg (5 mg x 1.5) every Thu; 5 mg (5 mg x 1) all other days   Weekly warfarin total:  37.5 mg   Plan last modified:  Radha Eugene, PharmD (10/18/2023)   Next INR check:  2024   Target end date:      Indications    LVAD (left ventricular assist device) present (Resolved) [Z95.811]  Anticoagulation monitoring  INR range 2-3 (Resolved) [Z79.01]                 Anticoagulation Episode Summary       INR check location:  Clinic Lab    Preferred lab:      Send INR reminders to:  [facility] COUMADIN LVAD    Comments:  LVAD/ [facility] Lab/Lab [place] Results:[phone] [facility]# [id number] Phone: [phone]/dc1/ [facility] [phone],fax [phone]/ Bridge:NO(h/o bleeds) see 3/12 encounter for meter process          Anticoagulation Care Providers       Provider Role Specialty Phone number    Antonio Hadley Jr., MD Responsible Cardiology [phone]

## 2024-05-21 NOTE — PROGRESS NOTES
05/21/2024-Spoke to pt regarding 5/21/24 missed INR. Pt stated he was out of town and will go for INR today.

## 2024-05-24 ENCOUNTER — ANTI-COAG VISIT (OUTPATIENT)
Dept: CARDIOLOGY | Facility: CLINIC | Age: 59
End: 2024-05-24
Payer: MEDICARE

## 2024-05-24 ENCOUNTER — LAB VISIT (OUTPATIENT)
Dept: LAB | Facility: HOSPITAL | Age: 59
End: 2024-05-24
Attending: INTERNAL MEDICINE
Payer: MEDICARE

## 2024-05-24 DIAGNOSIS — Z95.811 LVAD (LEFT VENTRICULAR ASSIST DEVICE) PRESENT: ICD-10-CM

## 2024-05-24 DIAGNOSIS — Z79.01 LONG TERM (CURRENT) USE OF ANTICOAGULANTS: ICD-10-CM

## 2024-05-24 LAB
INR PPP: 2.3 (ref 0.8–1.2)
PROTHROMBIN TIME: 23.6 SEC (ref 9–12.5)

## 2024-05-24 PROCEDURE — 93793 ANTICOAG MGMT PT WARFARIN: CPT | Mod: S$GLB,,,

## 2024-05-24 PROCEDURE — 36415 COLL VENOUS BLD VENIPUNCTURE: CPT | Performed by: INTERNAL MEDICINE

## 2024-05-24 PROCEDURE — 85610 PROTHROMBIN TIME: CPT | Performed by: INTERNAL MEDICINE

## 2024-05-29 ENCOUNTER — PATIENT MESSAGE (OUTPATIENT)
Dept: TRANSPLANT | Facility: CLINIC | Age: 59
End: 2024-05-29
Payer: MEDICARE

## 2024-05-30 ENCOUNTER — ANTI-COAG VISIT (OUTPATIENT)
Dept: CARDIOLOGY | Facility: CLINIC | Age: 59
End: 2024-05-30
Payer: MEDICARE

## 2024-05-30 ENCOUNTER — LAB VISIT (OUTPATIENT)
Dept: LAB | Facility: HOSPITAL | Age: 59
End: 2024-05-30
Attending: INTERNAL MEDICINE
Payer: MEDICARE

## 2024-05-30 DIAGNOSIS — Z95.811 LVAD (LEFT VENTRICULAR ASSIST DEVICE) PRESENT: ICD-10-CM

## 2024-05-30 DIAGNOSIS — Z79.01 LONG TERM (CURRENT) USE OF ANTICOAGULANTS: ICD-10-CM

## 2024-05-30 LAB
INR PPP: 1.8 (ref 0.8–1.2)
PROTHROMBIN TIME: 18.8 SEC (ref 9–12.5)

## 2024-05-30 PROCEDURE — 93793 ANTICOAG MGMT PT WARFARIN: CPT | Mod: S$GLB,,,

## 2024-05-30 PROCEDURE — 36415 COLL VENOUS BLD VENIPUNCTURE: CPT | Performed by: INTERNAL MEDICINE

## 2024-05-30 PROCEDURE — 85610 PROTHROMBIN TIME: CPT | Performed by: INTERNAL MEDICINE

## 2024-05-31 NOTE — PROGRESS NOTES
[facility]    05/31/2024    Tim Richards (58 y.o.) is followed by the [facility].      Assessment/Plan:    Tim Richards presents with a subtherapeutic  INR. Goal INR: 2.0-3.0    Lab Results   Component Value Date    INR 1.8 (H) 05/30/2024    INR 2.3 (H) 05/24/2024    INR 2.1 (H) 05/21/2024       Assessment of patient findings per MA/LPN and chart review:   The following significant findings were found:   Patient reports eating cabbage on 5/28, likely contributing to decreased INR    Recommendation for patient's warfarin regimen:   Due to subtherapeutic INR, patient was instructed to take a booster dose today. Patient to resume their normal weekly dose.    Recommended repeat INR in 1 week      Radha Eugene, PharmD, BCPS  Clinical Pharmacist - [facility]  Preferred Contact: Secure Messaging or In Basket Message

## 2024-06-05 ENCOUNTER — PATIENT MESSAGE (OUTPATIENT)
Dept: CARDIOTHORACIC SURGERY | Facility: CLINIC | Age: 59
End: 2024-06-05
Payer: MEDICARE

## 2024-06-05 DIAGNOSIS — Z95.811 LVAD (LEFT VENTRICULAR ASSIST DEVICE) PRESENT: Primary | ICD-10-CM

## 2024-06-06 ENCOUNTER — LAB VISIT (OUTPATIENT)
Dept: LAB | Facility: HOSPITAL | Age: 59
End: 2024-06-06
Attending: INTERNAL MEDICINE
Payer: MEDICARE

## 2024-06-06 DIAGNOSIS — Z95.811 HEART REPLACED BY HEART ASSIST DEVICE: ICD-10-CM

## 2024-06-06 LAB
INR PPP: 2.5 (ref 0.8–1.2)
PROTHROMBIN TIME: 26.1 SEC (ref 9–12.5)

## 2024-06-06 PROCEDURE — 36415 COLL VENOUS BLD VENIPUNCTURE: CPT | Performed by: INTERNAL MEDICINE

## 2024-06-06 PROCEDURE — 85610 PROTHROMBIN TIME: CPT | Performed by: INTERNAL MEDICINE

## 2024-06-07 ENCOUNTER — ANTI-COAG VISIT (OUTPATIENT)
Dept: CARDIOLOGY | Facility: CLINIC | Age: 59
End: 2024-06-07
Payer: MEDICARE

## 2024-06-07 PROCEDURE — 93793 ANTICOAG MGMT PT WARFARIN: CPT | Mod: S$GLB,,,

## 2024-06-07 NOTE — PROGRESS NOTES
[facility]    2024 8:29 AM    Assessment/Plan:    Patient presents today with therapeutic INR.    Assessment of patient findings and chart review: no significant findings     Recommendation for patient's warfarin regimen: Continue current maintenance dose    Recommend repeat INR in 1 week  _________________________________________________________________    Tim Richards (58 y.o.) is followed by the [facility].    Anticoagulation Summary  As of 2024      INR goal:  2.0-3.0   TTR:  70.4% (5.7 y)   INR used for dosin.5 (2024)   Warfarin maintenance plan:  7.5 mg (5 mg x 1.5) every Thu; 5 mg (5 mg x 1) all other days   Weekly warfarin total:  37.5 mg   Plan last modified:  Radha Eugene, PharmD (10/18/2023)   Next INR check:  2024   Target end date:      Indications    LVAD (left ventricular assist device) present (Resolved) [Z95.811]  Anticoagulation monitoring  INR range 2-3 (Resolved) [Z79.01]                 Anticoagulation Episode Summary       INR check location:  Clinic Lab    Preferred lab:      Send INR reminders to:  [facility] COUMADIN LVAD    Comments:  LVAD/ [facility] Lab/Lab [place] Results:[phone] [facility]# [id number] Phone: [phone]/dc1/ [facility] [phone],fax [phone]/ Bridge:NO(h/o bleeds) see 3/12 encounter for meter process          Anticoagulation Care Providers       Provider Role Specialty Phone number    Antonio Hadley Jr., MD Responsible Cardiology [phone]

## 2024-06-14 ENCOUNTER — LAB VISIT (OUTPATIENT)
Dept: LAB | Facility: HOSPITAL | Age: 59
End: 2024-06-14
Attending: INTERNAL MEDICINE
Payer: MEDICARE

## 2024-06-14 ENCOUNTER — ANTI-COAG VISIT (OUTPATIENT)
Dept: CARDIOLOGY | Facility: CLINIC | Age: 59
End: 2024-06-14
Payer: MEDICARE

## 2024-06-14 DIAGNOSIS — Z95.811 HEART REPLACED BY HEART ASSIST DEVICE: ICD-10-CM

## 2024-06-14 LAB
INR PPP: 3.5 (ref 0.8–1.2)
PROTHROMBIN TIME: 35.1 SEC (ref 9–12.5)

## 2024-06-14 PROCEDURE — 85610 PROTHROMBIN TIME: CPT | Performed by: INTERNAL MEDICINE

## 2024-06-14 PROCEDURE — 36415 COLL VENOUS BLD VENIPUNCTURE: CPT | Performed by: INTERNAL MEDICINE

## 2024-06-14 PROCEDURE — 93793 ANTICOAG MGMT PT WARFARIN: CPT | Mod: S$GLB,,,

## 2024-06-14 NOTE — PROGRESS NOTES
06/14/2024-Spoke to pt regarding 6/13/24 missed INR. Pt stated he will go for INR today at 1pm.      yes

## 2024-06-14 NOTE — PROGRESS NOTES
[facility]    06/14/2024 2:18 PM    Assessment/Plan:    Patient presents today with supratherapeutic INR.    Assessment of patient findings and chart review: took higher dose than advised on 6/11; also reports that knee is swollen and painful after bumping it on coffee table - will advise that he have it evaluated    Recommendation for patient's warfarin regimen: Lower dose today to 2.5mg then resume current maintenance dose    Recommend repeat INR Tuesday  _________________________________________________________________    Tim Richards (58 y.o.) is followed by the [facility].    Anticoagulation Summary  As of 6/14/2024      INR goal:  2.0-3.0   TTR:  70.3% (5.7 y)   INR used for dosing:  3.5 (6/14/2024)   Warfarin maintenance plan:  7.5 mg (5 mg x 1.5) every Thu; 5 mg (5 mg x 1) all other days   Weekly warfarin total:  37.5 mg   Plan last modified:  Radha Eugene, PharmD (10/18/2023)   Next INR check:  6/18/2024   Target end date:      Indications    LVAD (left ventricular assist device) present (Resolved) [Z95.811]  Anticoagulation monitoring  INR range 2-3 (Resolved) [Z79.01]                 Anticoagulation Episode Summary       INR check location:  Clinic Lab    Preferred lab:      Send INR reminders to:  [facility] COUMADIN LVAD    Comments:  LVAD/ [facility] Lab/Lab [place] Results:[phone] Acct# [id number] Phone: [phone] [phone]/dc1/16/23 [facility] [phone],fax [phone]/ Bridge:NO(h/o bleeds) see 3/12 encounter for meter process          Anticoagulation Care Providers       Provider Role Specialty Phone number    Antonio Hadley Jr., MD Responsible Cardiology [phone]

## 2024-06-14 NOTE — PROGRESS NOTES
Pt stated he took Warfarin 7.5mg on 6/11/24 by mistake, took correct dose all other days, bumped his knee on the coffee table and it is now painful and swollen on 6/12/24 and it's painful to walk(stated he didn't notify LVAD Team), no other changes reported.

## 2024-06-18 ENCOUNTER — LAB VISIT (OUTPATIENT)
Dept: LAB | Facility: HOSPITAL | Age: 59
End: 2024-06-18
Attending: INTERNAL MEDICINE
Payer: MEDICARE

## 2024-06-18 ENCOUNTER — ANTI-COAG VISIT (OUTPATIENT)
Dept: CARDIOLOGY | Facility: CLINIC | Age: 59
End: 2024-06-18
Payer: MEDICARE

## 2024-06-18 DIAGNOSIS — Z95.811 HEART REPLACED BY HEART ASSIST DEVICE: ICD-10-CM

## 2024-06-18 LAB
INR PPP: 2.4 (ref 0.8–1.2)
PROTHROMBIN TIME: 24.5 SEC (ref 9–12.5)

## 2024-06-18 PROCEDURE — 36415 COLL VENOUS BLD VENIPUNCTURE: CPT | Performed by: INTERNAL MEDICINE

## 2024-06-18 PROCEDURE — 85610 PROTHROMBIN TIME: CPT | Performed by: INTERNAL MEDICINE

## 2024-06-18 NOTE — PROGRESS NOTES
[facility]    2024 2:12 PM    Assessment/Plan:    Patient presents today with therapeutic INR.    Assessment of patient findings and chart review: no significant findings     Recommendation for patient's warfarin regimen: Continue current maintenance dose    Recommend repeat INR in 1 week  _________________________________________________________________    Tim Richards (58 y.o.) is followed by the [facility].    Anticoagulation Summary  As of 2024      INR goal:  2.0-3.0   TTR:  70.3% (5.7 y)   INR used for dosin.4 (2024)   Warfarin maintenance plan:  7.5 mg (5 mg x 1.5) every Thu; 5 mg (5 mg x 1) all other days   Weekly warfarin total:  37.5 mg   Plan last modified:  Radha Eugene, PharmD (10/18/2023)   Next INR check:  2024   Target end date:      Indications    LVAD (left ventricular assist device) present (Resolved) [Z95.811]  Anticoagulation monitoring  INR range 2-3 (Resolved) [Z79.01]                 Anticoagulation Episode Summary       INR check location:  Clinic Lab    Preferred lab:      Send INR reminders to:  [facility] COUMADIN LVAD    Comments:  LVAD/ [facility] Lab/Lab [place] Results:[phone] [facility]# [id number] Phone: [phone]/dc1/ [facility] [phone],fax [phone]/ Bridge:NO(h/o bleeds) see 3/12 encounter for meter process          Anticoagulation Care Providers       Provider Role Specialty Phone number    Antonio Hadley Jr., MD Responsible Cardiology [phone]

## 2024-06-18 NOTE — PROGRESS NOTES
Patient advised to take coumadin as per calendar. Follow up appointment scheduled. Patient verbalized understanding.

## 2024-06-25 ENCOUNTER — LAB VISIT (OUTPATIENT)
Dept: LAB | Facility: HOSPITAL | Age: 59
End: 2024-06-25
Attending: INTERNAL MEDICINE
Payer: MEDICARE

## 2024-06-25 ENCOUNTER — ANTI-COAG VISIT (OUTPATIENT)
Dept: CARDIOLOGY | Facility: CLINIC | Age: 59
End: 2024-06-25
Payer: MEDICARE

## 2024-06-25 DIAGNOSIS — Z95.811 HEART REPLACED BY HEART ASSIST DEVICE: ICD-10-CM

## 2024-06-25 LAB
INR PPP: 2.3 (ref 0.8–1.2)
PROTHROMBIN TIME: 23.8 SEC (ref 9–12.5)

## 2024-06-25 PROCEDURE — 36415 COLL VENOUS BLD VENIPUNCTURE: CPT | Performed by: INTERNAL MEDICINE

## 2024-06-25 PROCEDURE — 85610 PROTHROMBIN TIME: CPT | Performed by: INTERNAL MEDICINE

## 2024-06-25 NOTE — PROGRESS NOTES
[facility] [employer]    2024 2:48 PM    Assessment/Plan:    Patient presents today with therapeutic INR.    Assessment of patient findings and chart review: no significant findings     Recommendation for patient's warfarin regimen: Continue current maintenance dose    Recommend repeat INR in 1 week  _________________________________________________________________    Tim Richards (58 y.o.) is followed by the [facility].    Anticoagulation Summary  As of 2024      INR goal:  2.0-3.0   TTR:  70.4% (5.7 y)   INR used for dosin.3 (2024)   Warfarin maintenance plan:  7.5 mg (5 mg x 1.5) every Thu; 5 mg (5 mg x 1) all other days   Weekly warfarin total:  37.5 mg   Plan last modified:  Radha Eugene, PharmD (10/18/2023)   Next INR check:  2024   Target end date:      Indications    LVAD (left ventricular assist device) present (Resolved) [Z95.811]  Anticoagulation monitoring  INR range 2-3 (Resolved) [Z79.01]                 Anticoagulation Episode Summary       INR check location:  Clinic Lab    Preferred lab:      Send INR reminders to:  [facility] COUMADIN LVAD    Comments:  LVAD/ [facility] Lab/Lab [place] Results:[phone] [facility]# [id number] Phone: [phone]/dc1/ [facility] [phone],fax [phone]/ Bridge:NO(h/o bleeds) see 3/12 encounter for meter process          Anticoagulation Care Providers       Provider Role Specialty Phone number    Antonio Hadley Jr., MD Responsible Cardiology [phone]

## 2024-06-27 ENCOUNTER — PATIENT MESSAGE (OUTPATIENT)
Dept: TRANSPLANT | Facility: CLINIC | Age: 59
End: 2024-06-27
Payer: MEDICARE

## 2024-07-02 ENCOUNTER — ANTI-COAG VISIT (OUTPATIENT)
Dept: CARDIOLOGY | Facility: CLINIC | Age: 59
End: 2024-07-02
Payer: MEDICARE

## 2024-07-02 ENCOUNTER — LAB VISIT (OUTPATIENT)
Dept: LAB | Facility: HOSPITAL | Age: 59
End: 2024-07-02
Attending: INTERNAL MEDICINE
Payer: MEDICARE

## 2024-07-02 DIAGNOSIS — Z95.811 HEART REPLACED BY HEART ASSIST DEVICE: ICD-10-CM

## 2024-07-02 LAB
INR PPP: 3.4 (ref 0.8–1.2)
PROTHROMBIN TIME: 34.4 SEC (ref 9–12.5)

## 2024-07-02 PROCEDURE — 85610 PROTHROMBIN TIME: CPT | Performed by: INTERNAL MEDICINE

## 2024-07-02 PROCEDURE — 36415 COLL VENOUS BLD VENIPUNCTURE: CPT | Performed by: INTERNAL MEDICINE

## 2024-07-02 NOTE — PROGRESS NOTES
[facility]    07/02/2024 2:10 PM    Assessment/Plan:    Patient presents today with supratherapeutic INR.    Assessment of patient findings and chart review: INR above goal, no significant changes to account    Recommendation for patient's warfarin regimen: Lower dose today to 2.5mg then resume current maintenance dose    Recommend repeat INR in 1 week  _________________________________________________________________    Tim Richards (58 y.o.) is followed by the [facility] [facility] [facility].    Anticoagulation Summary  As of 7/2/2024      INR goal:  2.0-3.0   TTR:  70.4% (5.7 y)   INR used for dosing:  3.4 (7/2/2024)   Warfarin maintenance plan:  7.5 mg (5 mg x 1.5) every Thu; 5 mg (5 mg x 1) all other days   Weekly warfarin total:  37.5 mg   Plan last modified:  Radha Eugene, PharmD (10/18/2023)   Next INR check:  7/8/2024   Target end date:      Indications    LVAD (left ventricular assist device) present (Resolved) [Z95.811]  Anticoagulation monitoring  INR range 2-3 (Resolved) [Z79.01]                 Anticoagulation Episode Summary       INR check location:  Clinic Lab    Preferred lab:      Send INR reminders to:  [facility] COUMADIN LVAD    Comments:  LVAD/ [facility] Lab/Lab [place] Results:[phone] [facility]# [id number] Phone: [phone]/dc1/16/23 [facility] -[phone],fax [phone]/ Bridge:NO(h/o bleeds) see 3/12 encounter for meter process          Anticoagulation Care Providers       Provider Role Specialty Phone number    Antonio Hadley Jr., MD [facility] Cardiology [phone]                               [id number]

## 2024-07-02 NOTE — PROGRESS NOTES
Patient reports correct Warfarin dose, edema in right leg from a fall he had a few weeks ago, stated he didn't report the fall to VAD Team, denies hitting his head, taking Tylenol for the edema in his leg, kiwi this AM for breakfast, no other changes reported.

## 2024-07-08 ENCOUNTER — OFFICE VISIT (OUTPATIENT)
Dept: FAMILY MEDICINE | Facility: CLINIC | Age: 59
End: 2024-07-08
Payer: MEDICARE

## 2024-07-08 ENCOUNTER — ANTI-COAG VISIT (OUTPATIENT)
Dept: CARDIOLOGY | Facility: CLINIC | Age: 59
End: 2024-07-08
Payer: MEDICARE

## 2024-07-08 ENCOUNTER — LAB VISIT (OUTPATIENT)
Dept: LAB | Facility: HOSPITAL | Age: 59
End: 2024-07-08
Attending: INTERNAL MEDICINE
Payer: MEDICARE

## 2024-07-08 VITALS
TEMPERATURE: 99 F | BODY MASS INDEX: 30.35 KG/M2 | HEART RATE: 53 BPM | WEIGHT: 229 LBS | SYSTOLIC BLOOD PRESSURE: 78 MMHG | HEIGHT: 73 IN

## 2024-07-08 DIAGNOSIS — T16.2XXA FOREIGN BODY IN AURICLE OF LEFT EAR, INITIAL ENCOUNTER: Primary | ICD-10-CM

## 2024-07-08 DIAGNOSIS — Z95.811 HEART REPLACED BY HEART ASSIST DEVICE: ICD-10-CM

## 2024-07-08 LAB
INR PPP: 3.5 (ref 0.8–1.2)
PROTHROMBIN TIME: 35 SEC (ref 9–12.5)

## 2024-07-08 PROCEDURE — 99999 PR PBB SHADOW E&M-EST. PATIENT-LVL IV: CPT | Mod: PBBFAC,,, | Performed by: NURSE PRACTITIONER

## 2024-07-08 PROCEDURE — 1159F MED LIST DOCD IN RCRD: CPT | Mod: CPTII,S$GLB,, | Performed by: NURSE PRACTITIONER

## 2024-07-08 PROCEDURE — 3008F BODY MASS INDEX DOCD: CPT | Mod: CPTII,S$GLB,, | Performed by: NURSE PRACTITIONER

## 2024-07-08 PROCEDURE — 36415 COLL VENOUS BLD VENIPUNCTURE: CPT | Performed by: INTERNAL MEDICINE

## 2024-07-08 PROCEDURE — 99213 OFFICE O/P EST LOW 20 MIN: CPT | Mod: S$GLB,,, | Performed by: NURSE PRACTITIONER

## 2024-07-08 PROCEDURE — 85610 PROTHROMBIN TIME: CPT | Performed by: INTERNAL MEDICINE

## 2024-07-08 RX ORDER — MUPIROCIN 20 MG/G
OINTMENT TOPICAL 2 TIMES DAILY
Qty: 30 G | Refills: 0 | Status: SHIPPED | OUTPATIENT
Start: 2024-07-08 | End: 2024-07-13

## 2024-07-08 NOTE — PROGRESS NOTES
Subjective:       Patient ID: Tim Richards is a 58 y.o. male.    Chief Complaint: Foreign Body in Ear    HPI     Tim Richards is a 58 y.o. male patient that presents to clinic with a chief complaint of earlobe problem. Past medical and surgical history reviewed as listed. PCP is Diego Daniel MD , he is  known  to me. Patient reports a his earring back has be lodged in his piercing opening for approximately two weeks. Non tender, no discharge. Wife tried to remove earring back but unable to successfully remove object.     ROS as listed.   Past Medical History:   Diagnosis Date    A-fib     Anticoagulant long-term use     Atrial flutter 7/30/2022    CHF (congestive heart failure)     Class 1 obesity due to excess calories with serious comorbidity and body mass index (BMI) of 31.0 to 31.9 in adult     Class 1 obesity due to excess calories with serious comorbidity and body mass index (BMI) of 32.0 to 32.9 in adult     Dilated cardiomyopathy 1/10/2018    Disorder of kidney and ureter     CKD    Encounter for blood transfusion     Essential hypertension 8/28/2022    Gout     HTN (hypertension)     Hx of psychiatric care     ICD (implantable cardioverter-defibrillator) infection 7/1/2020    Psychiatric problem     Thyroid disease     Ventricular tachycardia (paroxysmal)       Past Surgical History:   Procedure Laterality Date    AORTIC VALVULOPLASTY N/A 07/13/2022    Procedure: REPAIR, AORTIC VALVE;  Surgeon: Yg Kaufman MD;  Location: [facility] OR [address] [place] [address];  Service: Cardiovascular;  Laterality: N/A;    APPLICATION OF WOUND VACUUM-ASSISTED CLOSURE DEVICE N/A 07/15/2022    Procedure: APPLICATION, WOUND VAC;  Surgeon: Yg Kaufman MD;  Location: [facility] OR [address] [place] [address];  Service: Cardiovascular;  Laterality: N/A;  50 x 5 cm     APPLICATION OF WOUND VACUUM-ASSISTED CLOSURE DEVICE Right 09/27/2022    Procedure: APPLICATION, WOUND VAC;  Surgeon: Kole Tabares MD;  Location: [facility] OR [address] [place] [address];  Service: Plastics;   Laterality: Right;    CARDIAC CATHETERIZATION  12/2012    CARDIAC DEFIBRILLATOR PLACEMENT Left     CRRT-D    CARDIAC VALVE REPLACEMENT  03/08/2018    LVAD Implant    COLONOSCOPY N/A 03/06/2018    Procedure: COLONOSCOPY;  Surgeon: Alonso Bone MD;  Location: [facility] ENDO (2ND FLR);  Service: Endoscopy;  Laterality: N/A;    COLONOSCOPY N/A 07/17/2019    Procedure: COLONOSCOPY;  Surgeon: Blane Vadlez MD;  Location: [facility] ENDO (2ND FLR);  Service: Endoscopy;  Laterality: N/A;    COLONOSCOPY N/A 07/18/2019    Procedure: COLONOSCOPY;  Surgeon: Blane Valdez MD;  Location: [facility] ENDO (2ND FLR);  Service: Endoscopy;  Laterality: N/A;    CREATION OF ILIOFEMORAL ARTERY BYPASS Right 09/27/2022    Procedure: CREATION, BYPASS, ARTERIAL, ILIAC TO FEMORAL WITH GRAFT;  Surgeon: Zach Hernández MD;  [facility];  Service: Peripheral Vascular;  Laterality: Right;    CREATION OF MUSCLE ROTATIONAL FLAP Right 09/27/2022    Procedure: CREATION, FLAP, MUSCLE ROTATION, SARTORIUS AND RECTUS FEMORIS;  Surgeon: Kole Tabares MD;  [facility];  Service: Plastics;  Laterality: Right;    ESOPHAGOGASTRODUODENOSCOPY N/A 07/17/2019    Procedure: EGD (ESOPHAGOGASTRODUODENOSCOPY);  Surgeon: Blane Valdez MD;  Location: [facility] ENDO (2ND FLR);  Service: Endoscopy;  Laterality: N/A;    ESOPHAGOGASTRODUODENOSCOPY N/A 07/18/2019    Procedure: EGD (ESOPHAGOGASTRODUODENOSCOPY);  Surgeon: Blane Valdez MD;  Location: [facility] ENDO (2ND FLR);  Service: Endoscopy;  Laterality: N/A;    FOREIGN BODY REMOVAL N/A 07/22/2022    Procedure: REMOVAL, FOREIGN BODY;  Surgeon: Yg Kaufman MD;  Location: [facility] OR 2ND FLR;  Service: Cardiovascular;  Laterality: N/A;  LVAD Heartmate 2 drive line removal    INSERTION OF GRAFT TO PERICARDIUM N/A 07/15/2022    Procedure: INSERTION, GRAFT, PERICARDIUM;  Surgeon: Yg Kaufman MD;  Location: [facility] OR 2ND FLR;  Service: Cardiovascular;  Laterality: N/A;    IRRIGATION OF MEDIASTINUM N/A 07/15/2022     Procedure: IRRIGATION, MEDIASTINUM;  Surgeon: Yg Kaufman MD;  [facility];  Service: Cardiovascular;  Laterality: N/A;    LYSIS OF ADHESIONS  07/13/2022    Procedure: LYSIS, ADHESIONS;  Surgeon: Yg Kaufman MD;  Location: [facility] OR [address] [place] [address];  Service: Cardiovascular;;    NONINVASIVE CARDIAC ELECTROPHYSIOLOGY STUDY N/A 10/18/2019    Procedure: CARDIAC ELECTROPHYSIOLOGY STUDY, NONINVASIVE;  Surgeon: Raz Wagner MD;  Location: [facility] EP LAB;  Service: Cardiology;  Laterality: N/A;  VT, DFTs, MDT CRTD in situ, LVAD, anes, MB, 3098    REPLACEMENT OF IMPLANTABLE CARDIOVERTER-DEFIBRILLATOR (ICD) GENERATOR N/A 03/09/2020    Procedure: REPLACEMENT, ICD GENERATOR;  Surgeon: Harry Yun MD;  Location: [facility] EP LAB;  Service: Cardiology;  Laterality: N/A;  VT, ICD Gen Change and Lead Revision, MDT, MAC, DM,3 Prep    REPLACEMENT OF LEFT VENTRICULAR ASSIST DEVICE (LVAD)  07/13/2022    Procedure: REPLACEMENT, LVAD;  Surgeon: Yg Kaufman MD;  Location: [facility] OR [address] [place] [address];  Service: Cardiovascular;;    REPLACEMENT OF PUMP N/A 07/13/2022    Procedure: REPLACEMENT, PUMP;  Surgeon: Yg Kaufman MD;  Location: [address];  Service: Cardiovascular;  Laterality: N/A;  LVAD pump exchange  EXPLANATION OF HEATMATE 2  IMPLANTATION OF HEARTMATE 3  IMPLANTATION OF 8MM CHIMNEY GRAFT TO RFA  INITIATION OF ECMO  TEMPORARY CLOSURE OF CHEST    REVISION OF IMPLANTABLE CARDIOVERTER-DEFIBRILLATOR (ICD) ELECTRODE LEAD PLACEMENT N/A 03/09/2020    Procedure: REVISION, INSERTION, ELECTRODE LEAD, ICD;  Surgeon: Harry Yun MD;  Location: [facility] EP LAB;  Service: Cardiology;  Laterality: N/A;  VT, ICD Gen Change and Lead Revision, MDT, MAC, DM,3 Prep    RIGHT HEART CATHETERIZATION Right 09/08/2023    Procedure: INSERTION, CATHETER, RIGHT HEART;  Surgeon: Gaurav Rodriguez MD;  Location: [facility] CATH LAB;  Service: Cardiology;  Laterality: Right;    STERNAL WOUND CLOSURE N/A 07/14/2022    Procedure: CLOSURE, WOUND, STERNUM;   "Surgeon: Yg Kaufman MD;  [facility];  Service: Cardiovascular;  Laterality: N/A;  temporary closure  evacuation of hematoma    STERNAL WOUND CLOSURE N/A 07/15/2022    Procedure: CLOSURE, WOUND, STERNUM;  Surgeon: Yg Kaufman MD;  [facility];  Service: Cardiovascular;  Laterality: N/A;    TRACHEOSTOMY N/A 08/04/2022    Procedure: CREATION, TRACHEOSTOMY;  Surgeon: Germain Holt MD;  [facility];  Service: General;  Laterality: N/A;    TREATMENT OF CARDIAC ARRHYTHMIA  10/18/2019    Procedure: Cardioversion or Defibrillation;  Surgeon: Raz Wagner MD;  Location: [facility] EP LAB;  Service: Cardiology;;      Family History   Problem Relation Name Age of Onset    Hypertension Father Crosbymiryam Richards     Diabetes Father Kolby Richards     Coronary artery disease Father Kolby Richards     Heart disease Father Kolby Richards         CHF    No Known Problems Mother      Cancer Sister Kelly Forde 54        breast CA    No Known Problems Brother      Anxiety disorder Neg Hx      Depression Neg Hx      Dementia Neg Hx      Bipolar disorder Neg Hx      Suicide Neg Hx        Review of patient's allergies indicates:   Allergen Reactions    Lisinopril Anaphylaxis    Hydralazine     Hydralazine analogues      Chronic constipation, impotence, dizziness     Review of Systems   HENT:  Positive for ear discharge and ear pain. Negative for hearing loss, rhinorrhea and sore throat.    Respiratory:  Negative for cough.    Gastrointestinal:  Negative for abdominal pain, diarrhea and vomiting.   Musculoskeletal:  Negative for neck pain.   Skin:  Negative for rash.   Neurological:  Negative for headaches.       Objective:      Vitals:    07/08/24 1123   BP: (!) 78/0   Pulse: (!) 53   Temp: 98.5 °F (36.9 °C)   TempSrc: Oral   Weight: 103.9 kg (229 lb)   Height: 6' 1" (1.854 m)      Physical Exam  Vitals and nursing note reviewed.   Constitutional:       General: He is not in acute distress.     " Appearance: Normal appearance.   HENT:      Head: Normocephalic and atraumatic.      Left Ear: A foreign body is present.      Ears:     Eyes:      Conjunctiva/sclera: Conjunctivae normal.      Pupils: Pupils are equal, round, and reactive to light.   Pulmonary:      Effort: Pulmonary effort is normal. No respiratory distress.   Musculoskeletal:      Cervical back: Normal range of motion.   Skin:     General: Skin is warm and dry.   Neurological:      Mental Status: He is alert and oriented to person, place, and time.   Psychiatric:         Mood and Affect: Mood normal.         Behavior: Behavior normal.         Lab Results   Component Value Date    WBC 2.83 (L) 03/07/2024    HGB 14.9 03/07/2024    HCT 48.6 03/07/2024     03/07/2024    CHOL 250 (H) 03/07/2024    TRIG 126 03/07/2024     (H) 03/07/2024    ALT 17 04/12/2024    AST 34 04/12/2024     04/12/2024    K 3.9 04/12/2024     04/12/2024    CREATININE 2.0 (H) 04/12/2024    BUN 12 04/12/2024    CO2 23 04/12/2024    TSH 11.576 (H) 09/08/2023    PSA 0.99 01/23/2018    INR 3.5 (H) 07/08/2024    HGBA1C 5.4 04/26/2021      Assessment:       1. Foreign body in auricle of left ear, initial encounter        Plan:       Foreign body in auricle of left ear, initial encounter  Cleaned ear lobe with alcohol and removed with sterile forceps.   -     mupirocin (BACTROBAN) 2 % ointment; Apply topically 2 (two) times daily. for 5 days  Dispense: 30 g; Refill: 0      Medication List with Changes/Refills   New Medications    MUPIROCIN (BACTROBAN) 2 % OINTMENT    Apply topically 2 (two) times daily. for 5 days   Current Medications    ALPRAZOLAM (XANAX) 1 MG TABLET    Take 1 tablet (1 mg total) by mouth 2 (two) times daily as needed for Anxiety.    AMIODARONE (PACERONE) 200 MG TAB    Take 2 tablets (400 mg total) by mouth once daily.    AMLODIPINE (NORVASC) 10 MG TABLET    TAKE 1 TABLET BY MOUTH EVERY DAY    CYANOCOBALAMIN 1,000 MCG/ML INJECTION    INJECT 1  ML (1,000 MCG TOTAL) INTO THE SKIN ONCE A WEEK. FOR 24 DOSES    FERROUS GLUCONATE (FERGON) 324 MG TABLET    Take 1 tablet (324 mg total) by mouth 2 (two) times daily with meals.    LEVOTHYROXINE (SYNTHROID) 125 MCG TABLET    Take 1 tablet (125 mcg total) by mouth before breakfast.    MAGNESIUM OXIDE (MAG-OX) 400 MG (241.3 MG MAGNESIUM) TABLET    Take 1 tablet (400 mg total) by mouth 2 (two) times daily.    MEXILETINE (MEXITIL) 250 MG CAP    Take 1 capsule (250 mg total) by mouth every 8 (eight) hours.    MIRTAZAPINE (REMERON) 30 MG TABLET    Take 1 tablet (30 mg total) by mouth every evening.    OMEGA-3 ACID ETHYL ESTERS (LOVAZA) 1 GRAM CAPSULE    Take 2 capsules (2 g total) by mouth 2 (two) times daily.    PANTOPRAZOLE (PROTONIX) 40 MG TABLET    Take 1 tablet (40 mg total) by mouth daily with lunch.    POTASSIUM CHLORIDE SA (K-DUR,KLOR-CON) 20 MEQ TABLET    Take 1 tablet with every lasix 40 mg dose    TORSEMIDE (DEMADEX) 20 MG TAB    Take 1 tablets (20mg) on Mondays.    WARFARIN (COUMADIN) 5 MG TABLET    Take 1-1.5 tablets (5-7.5 mg total) by mouth Daily. As directed by the [facility]    ZOLPIDEM (AMBIEN CR) 12.5 MG CR TABLET    Take 1 tablet (12.5 mg total) by mouth nightly as needed for Insomnia.                Erica Amos, DNP, APRN, FNP-C  Family Medicine  [facility]    Answers submitted by the patient for this visit:  Ear Pain Questionnaire (Submitted on 7/6/2024)  Chief Complaint: Ear pain  Affected ear: left  Chronicity: new  Onset: 1 to 4 weeks ago  Progression since onset: gradually worsening  Frequency: every few hours  Fever: no fever  Pain - numeric: 1/10  drainage: No  Improvement on treatment: no relief  Pain severity: mild  chronic ear infection: No  hearing loss: No  tympanostomy tube: No

## 2024-07-08 NOTE — PROGRESS NOTES
[facility]    07/08/2024 3:02 PM    Assessment/Plan:    Patient presents today with supratherapeutic INR.    Assessment of patient findings and chart review: Reports taking higher dose than advised     Recommendation for patient's warfarin regimen: Lower dose today to 2.5mg then resume current maintenance dose    Recommend repeat INR Thursday  _________________________________________________________________    Tim Richards (58 y.o.) is followed by the [facility].    Anticoagulation Summary  As of 7/8/2024      INR goal:  2.0-3.0   TTR:  70.2% (5.7 y)   INR used for dosing:  3.5 (7/8/2024)   Warfarin maintenance plan:  7.5 mg (5 mg x 1.5) every Sun; 5 mg (5 mg x 1) all other days   Weekly warfarin total:  37.5 mg   Plan last modified:  Pearl Mendez, PharmD (7/8/2024)   Next INR check:  7/11/2024   Target end date:      Indications    LVAD (left ventricular assist device) present (Resolved) [Z95.811]  Anticoagulation monitoring  INR range 2-3 (Resolved) [Z79.01]                 Anticoagulation Episode Summary       INR check location:  Clinic Lab    Preferred lab:      Send INR reminders to:  [facility] COUMADIN LVAD    Comments:  LVAD/ [facility]/Lab [place] Results:[phone] [facility]# [id number] Phone: [phone] [phone]/dc1/16/23 [facility] -[phone],fax [phone]/ Bridge:NO(h/o bleeds) see 3/12 encounter for meter process          Anticoagulation Care Providers       Provider Role Specialty Phone number    Antonio Hadley Jr., MD Responsible Cardiology [phone]

## 2024-07-08 NOTE — PROGRESS NOTES
7/8- Pt confirmed dosing, however he stated that he took 7.5 mg on 7/7 instead of 5 mg. He also had a salad with kale, spinach and iceberg lettuce on 7/6. Pt denies any other changes to health, meds, and diet.

## 2024-07-11 ENCOUNTER — LAB VISIT (OUTPATIENT)
Dept: LAB | Facility: HOSPITAL | Age: 59
End: 2024-07-11
Attending: INTERNAL MEDICINE
Payer: MEDICARE

## 2024-07-11 ENCOUNTER — ANTI-COAG VISIT (OUTPATIENT)
Dept: CARDIOLOGY | Facility: CLINIC | Age: 59
End: 2024-07-11
Payer: MEDICARE

## 2024-07-11 DIAGNOSIS — Z95.811 HEART REPLACED BY HEART ASSIST DEVICE: ICD-10-CM

## 2024-07-11 LAB
INR PPP: 2.2 (ref 0.8–1.2)
PROTHROMBIN TIME: 22.6 SEC (ref 9–12.5)

## 2024-07-11 PROCEDURE — 36415 COLL VENOUS BLD VENIPUNCTURE: CPT | Performed by: INTERNAL MEDICINE

## 2024-07-11 PROCEDURE — 85610 PROTHROMBIN TIME: CPT | Performed by: INTERNAL MEDICINE

## 2024-07-11 NOTE — PROGRESS NOTES
[facility]    2024 2:54 PM    Assessment/Plan:    Patient presents today with therapeutic INR.    Assessment of patient findings and chart review: no significant findings     Recommendation for patient's warfarin regimen: Continue current maintenance dose    Recommend repeat INR Monday  _________________________________________________________________    Tim Richards (58 y.o.) is followed by the [facility].    Anticoagulation Summary  As of 2024      INR goal:  2.0-3.0   TTR:  70.1% (5.8 y)   INR used for dosin.2 (2024)   Warfarin maintenance plan:  7.5 mg (5 mg x 1.5) every Sun; 5 mg (5 mg x 1) all other days   Weekly warfarin total:  37.5 mg   Plan last modified:  Pearl Mendez, PharmD (2024)   Next INR check:  7/15/2024   Target end date:      Indications    LVAD (left ventricular assist device) present (Resolved) [Z95.811]  Anticoagulation monitoring  INR range 2-3 (Resolved) [Z79.01]                 Anticoagulation Episode Summary       INR check location:  Clinic Lab    Preferred lab:      Send INR reminders to:  [facility] COUMADIN LVAD    Comments:  LVAD/ [facility] Lab/Lab [place] Results:[phone] [facility]# [id number] Phone: [phone] [phone]/dc1/ [facility] [phone],fax [phone]/ Bridge:NO(h/o bleeds) see 3/12 encounter for meter process          Anticoagulation Care Providers       Provider Role Specialty Phone number    Antonio Hadley Jr., MD Responsible Cardiology [phone]

## 2024-07-15 ENCOUNTER — ANTI-COAG VISIT (OUTPATIENT)
Dept: CARDIOLOGY | Facility: CLINIC | Age: 59
End: 2024-07-15
Payer: MEDICARE

## 2024-07-15 ENCOUNTER — LAB VISIT (OUTPATIENT)
Dept: LAB | Facility: HOSPITAL | Age: 59
End: 2024-07-15
Attending: INTERNAL MEDICINE
Payer: MEDICARE

## 2024-07-15 DIAGNOSIS — Z95.811 HEART REPLACED BY HEART ASSIST DEVICE: ICD-10-CM

## 2024-07-15 LAB
INR PPP: 1.3 (ref 0.8–1.2)
PROTHROMBIN TIME: 14.2 SEC (ref 9–12.5)

## 2024-07-15 PROCEDURE — 36415 COLL VENOUS BLD VENIPUNCTURE: CPT | Performed by: INTERNAL MEDICINE

## 2024-07-15 PROCEDURE — 93793 ANTICOAG MGMT PT WARFARIN: CPT | Mod: S$GLB,,,

## 2024-07-15 PROCEDURE — 85610 PROTHROMBIN TIME: CPT | Performed by: INTERNAL MEDICINE

## 2024-07-15 NOTE — PROGRESS NOTES
Patient verified correct warfarin dose, reports had a lot of fluid intake, no other changes reported

## 2024-07-15 NOTE — PROGRESS NOTES
[facility]    07/15/2024 2:34 PM    Assessment/Plan:    Patient presents today with subtherapeutic  INR.    Assessment of patient findings and chart review: no significant findings     Recommendation for patient's warfarin regimen: Boost dose today to 7.5mg then resume current maintenance dose    Recommend repeat INR Friday  _________________________________________________________________    Tim Richards (58 y.o.) is followed by the [facility].    Anticoagulation Summary  As of 7/15/2024      INR goal:  2.0-3.0   TTR:  70.1% (5.8 y)   INR used for dosin.3 (7/15/2024)   Warfarin maintenance plan:  7.5 mg (5 mg x 1.5) every Sun; 5 mg (5 mg x 1) all other days   Weekly warfarin total:  37.5 mg   Plan last modified:  Pearl Mendez, PharmD (2024)   Next INR check:  2024   Target end date:      Indications    LVAD (left ventricular assist device) present (Resolved) [Z95.811]  Anticoagulation monitoring  INR range 2-3 (Resolved) [Z79.01]                 Anticoagulation Episode Summary       INR check location:  Clinic Lab    Preferred lab:      Send INR reminders to:  [facility] COUMADIN LVAD    Comments:  LVAD/ [facility] Lab/Lab [place] Results:[phone] [facility] [id number] Phone: [phone]/dc1/ [facility] -[phone],fax [phone]/ Bridge:NO(h/o bleeds) see 3/12 encounter for meter process          Anticoagulation Care Providers       Provider Role Specialty Phone number    Antonio Hadley Jr., MD Responsible Cardiology [phone]

## 2024-07-16 ENCOUNTER — PATIENT MESSAGE (OUTPATIENT)
Dept: TRANSPLANT | Facility: CLINIC | Age: 59
End: 2024-07-16
Payer: MEDICARE

## 2024-07-17 NOTE — PROGRESS NOTES
"- see "prophylactic immuno"    " New urinal given to pt and instructed to call after voiding. Lab at bedside doing lactic acid and procalcitonin levels. CXR, resp infection panel and blood cx ordered.

## 2024-07-19 ENCOUNTER — CLINICAL SUPPORT (OUTPATIENT)
Dept: CARDIOLOGY | Facility: HOSPITAL | Age: 59
End: 2024-07-19
Attending: INTERNAL MEDICINE
Payer: MEDICARE

## 2024-07-19 ENCOUNTER — CLINICAL SUPPORT (OUTPATIENT)
Dept: TRANSPLANT | Facility: CLINIC | Age: 59
End: 2024-07-19
Payer: MEDICARE

## 2024-07-19 ENCOUNTER — ANTI-COAG VISIT (OUTPATIENT)
Dept: CARDIOLOGY | Facility: CLINIC | Age: 59
End: 2024-07-19
Payer: MEDICARE

## 2024-07-19 DIAGNOSIS — T82.7XXA INFECTION ASSOCIATED WITH DRIVELINE OF VENTRICULAR ASSIST DEVICE: Primary | ICD-10-CM

## 2024-07-19 DIAGNOSIS — Z95.811 HEART REPLACED BY HEART ASSIST DEVICE: Primary | ICD-10-CM

## 2024-07-19 DIAGNOSIS — I47.20 VT (VENTRICULAR TACHYCARDIA): ICD-10-CM

## 2024-07-19 DIAGNOSIS — Z95.810 ICD (IMPLANTABLE CARDIOVERTER-DEFIBRILLATOR) IN PLACE: ICD-10-CM

## 2024-07-19 LAB
GRAM STN SPEC: NORMAL
GRAM STN SPEC: NORMAL

## 2024-07-19 PROCEDURE — 93793 ANTICOAG MGMT PT WARFARIN: CPT | Mod: S$GLB,,,

## 2024-07-19 PROCEDURE — 87075 CULTR BACTERIA EXCEPT BLOOD: CPT | Performed by: INTERNAL MEDICINE

## 2024-07-19 PROCEDURE — 87206 SMEAR FLUORESCENT/ACID STAI: CPT | Performed by: INTERNAL MEDICINE

## 2024-07-19 PROCEDURE — 87102 FUNGUS ISOLATION CULTURE: CPT | Performed by: INTERNAL MEDICINE

## 2024-07-19 PROCEDURE — 87205 SMEAR GRAM STAIN: CPT | Performed by: INTERNAL MEDICINE

## 2024-07-19 PROCEDURE — 93282 PRGRMG EVAL IMPLANTABLE DFB: CPT | Mod: 26,,, | Performed by: INTERNAL MEDICINE

## 2024-07-19 PROCEDURE — 93282 PRGRMG EVAL IMPLANTABLE DFB: CPT

## 2024-07-19 PROCEDURE — 87070 CULTURE OTHR SPECIMN AEROBIC: CPT | Performed by: INTERNAL MEDICINE

## 2024-07-19 PROCEDURE — 87116 MYCOBACTERIA CULTURE: CPT | Performed by: INTERNAL MEDICINE

## 2024-07-19 PROCEDURE — 99999 PR PBB SHADOW E&M-EST. PATIENT-LVL I: CPT | Mod: PBBFAC,,,

## 2024-07-19 RX ORDER — DOXYCYCLINE 100 MG/1
100 CAPSULE ORAL 2 TIMES DAILY
Qty: 60 CAPSULE | Refills: 2 | Status: SHIPPED | OUTPATIENT
Start: 2024-07-19 | End: 2024-10-17

## 2024-07-19 NOTE — PROGRESS NOTES
Spoke with patient regarding recent INR results .  Patient questioned and verified incorrect dosage  of 7.5 mg Sunday ; 5mg all others.   Taken 5mg Monday 7/15 - Reported no recent changes .

## 2024-07-19 NOTE — PROGRESS NOTES
Patient seen today as a nurse visit for suspicion of DLES infection. Patient states that he did not drop his controller or pull at driveline. However, he has neglected the site but not cleaning it as often as he should. I consulted with Dr. Forest Garg via phone as he had a previous infection. Restarted him on Doxy 100mg BID, cultures taken, and silver nitrate applied to hypergranulation (see pictures below). Patient is interested in learning the Silverlon kit as he heard about the kit from another patient. I gave patient the silverlon guideline and a practice kit to learn at home (not use on himself!). Will revisit with patient at next week's visit if he wants to switch to Silverlon.

## 2024-07-19 NOTE — NURSING
Plan of care discussed with patient.  Patient ambulating independently, fall precautions in place. LVAD DP and numbers WNL, smooth LVAD hum. Patient has no complaints of pain, no nausea or vomiting. Heparin gtt infusing @ 12units/kg/hr. Suction remains at bedside. Pt educated on need for monitoring UOP, pt voiced understanding. Discussed medications and care. Patient has no questions at this time. Will continue to monitor.     PROVIDER:[TOKEN:[[id number]],FOLLOWUP:[1 week]]

## 2024-07-19 NOTE — PROGRESS NOTES
[facility]    2024 1:15 PM    Assessment/Plan:    Patient presents today with subtherapeutic  INR.    Assessment of patient findings and chart review: did not boost Monday as advised     Recommendation for patient's warfarin regimen: Boost dose today to 7.5mg then resume current maintenance dose    Recommend repeat INR Monday  _________________________________________________________________    Tim Richards (58 y.o.) is followed by the [facility].    Anticoagulation Summary  As of 2024      INR goal:  2.0-3.0   TTR:  69.9% (5.8 y)   INR used for dosin.7 (2024)   Warfarin maintenance plan:  7.5 mg (5 mg x 1.5) every Sun; 5 mg (5 mg x 1) all other days   Weekly warfarin total:  37.5 mg   Plan last modified:  Pearl Mendez, PharmD (2024)   Next INR check:  2024   Target end date:      Indications    LVAD (left ventricular assist device) present (Resolved) [Z95.811]  Anticoagulation monitoring  INR range 2-3 (Resolved) [Z79.01]                 Anticoagulation Episode Summary       INR check location:  Clinic Lab    Preferred lab:      Send INR reminders to:  [facility] COUMADIN LVAD    Comments:  LVAD/ [facility] Lab/Lab [place] Results:[phone] [facility]# [id number] Phone: [phone]/dc1/ [facility] -[phone],fax [phone]/ Bridge:NO(h/o bleeds) see 3/12 encounter for meter process          Anticoagulation Care Providers       Provider Role Specialty Phone number    Antonio Hadley Jr., MD Responsible Cardiology [phone]

## 2024-07-22 LAB
ACID FAST MOD KINY STN SPEC: NORMAL
BACTERIA SPEC AEROBE CULT: ABNORMAL
MYCOBACTERIUM SPEC QL CULT: NORMAL

## 2024-07-23 ENCOUNTER — TELEPHONE (OUTPATIENT)
Dept: INFECTIOUS DISEASES | Facility: CLINIC | Age: 59
End: 2024-07-23
Payer: MEDICARE

## 2024-07-23 DIAGNOSIS — T82.7XXA INFECTION ASSOCIATED WITH DRIVELINE OF VENTRICULAR ASSIST DEVICE: Primary | ICD-10-CM

## 2024-07-23 LAB
BACTERIA SPEC ANAEROBE CULT: NORMAL
FUNGUS SPEC CULT: NORMAL

## 2024-07-23 RX ORDER — AMOXICILLIN AND CLAVULANATE POTASSIUM 875; 125 MG/1; MG/1
1 TABLET, FILM COATED ORAL EVERY 12 HOURS
Qty: 60 TABLET | Refills: 0 | Status: SHIPPED | OUTPATIENT
Start: 2024-07-23 | End: 2024-08-22

## 2024-07-23 NOTE — TELEPHONE ENCOUNTER
Called patient, with information below.   He verbalized understating.           ----- Message from Jessica Perez MD sent at 7/23/2024  3:39 PM CDT -----  Jordan Gutierrez, can you let Mr. Dumont know that I sent a prescription of augmentin to his pharmacy based on cultures that his VAD team took last week? Thank you

## 2024-07-25 ENCOUNTER — DOCUMENTATION ONLY (OUTPATIENT)
Dept: CARDIOTHORACIC SURGERY | Facility: CLINIC | Age: 59
End: 2024-07-25
Payer: MEDICARE

## 2024-07-25 ENCOUNTER — LAB VISIT (OUTPATIENT)
Dept: LAB | Facility: HOSPITAL | Age: 59
End: 2024-07-25
Payer: MEDICARE

## 2024-07-25 ENCOUNTER — CLINICAL SUPPORT (OUTPATIENT)
Dept: TRANSPLANT | Facility: CLINIC | Age: 59
End: 2024-07-25
Payer: MEDICARE

## 2024-07-25 ENCOUNTER — ANTI-COAG VISIT (OUTPATIENT)
Dept: CARDIOLOGY | Facility: CLINIC | Age: 59
End: 2024-07-25
Payer: MEDICARE

## 2024-07-25 ENCOUNTER — OFFICE VISIT (OUTPATIENT)
Dept: TRANSPLANT | Facility: CLINIC | Age: 59
End: 2024-07-25
Payer: MEDICARE

## 2024-07-25 ENCOUNTER — HOSPITAL ENCOUNTER (OUTPATIENT)
Dept: PULMONOLOGY | Facility: CLINIC | Age: 59
Discharge: HOME | End: 2024-07-25
Payer: MEDICARE

## 2024-07-25 VITALS
WEIGHT: 245 LBS | WEIGHT: 239 LBS | HEIGHT: 73 IN | SYSTOLIC BLOOD PRESSURE: 72 MMHG | BODY MASS INDEX: 32.47 KG/M2 | BODY MASS INDEX: 31.68 KG/M2 | HEIGHT: 73 IN

## 2024-07-25 DIAGNOSIS — Z95.811 LVAD (LEFT VENTRICULAR ASSIST DEVICE) PRESENT: Chronic | ICD-10-CM

## 2024-07-25 DIAGNOSIS — I10 PRIMARY HYPERTENSION: Chronic | ICD-10-CM

## 2024-07-25 DIAGNOSIS — Z95.811 HEART REPLACED BY HEART ASSIST DEVICE: ICD-10-CM

## 2024-07-25 DIAGNOSIS — I50.42 CHRONIC COMBINED SYSTOLIC AND DIASTOLIC CONGESTIVE HEART FAILURE: Chronic | ICD-10-CM

## 2024-07-25 DIAGNOSIS — T82.9XXD LEFT VENTRICULAR ASSIST DEVICE (LVAD) COMPLICATION, SUBSEQUENT ENCOUNTER: ICD-10-CM

## 2024-07-25 DIAGNOSIS — N18.32 STAGE 3B CHRONIC KIDNEY DISEASE: ICD-10-CM

## 2024-07-25 DIAGNOSIS — Z95.811 LVAD (LEFT VENTRICULAR ASSIST DEVICE) PRESENT: ICD-10-CM

## 2024-07-25 DIAGNOSIS — Z95.811 LVAD (LEFT VENTRICULAR ASSIST DEVICE) PRESENT: Primary | Chronic | ICD-10-CM

## 2024-07-25 DIAGNOSIS — Z79.01 ANTICOAGULATION MONITORING, INR RANGE 2-3: ICD-10-CM

## 2024-07-25 DIAGNOSIS — I42.0 DILATED CARDIOMYOPATHY: ICD-10-CM

## 2024-07-25 DIAGNOSIS — Z79.899 HIGH RISK MEDICATIONS (NOT ANTICOAGULANTS) LONG-TERM USE: ICD-10-CM

## 2024-07-25 DIAGNOSIS — G47.33 OSA (OBSTRUCTIVE SLEEP APNEA): Chronic | ICD-10-CM

## 2024-07-25 DIAGNOSIS — Z95.811 LVAD (LEFT VENTRICULAR ASSIST DEVICE) PRESENT: Primary | ICD-10-CM

## 2024-07-25 LAB
ALBUMIN SERPL BCP-MCNC: 4 G/DL (ref 3.5–5.2)
ALP SERPL-CCNC: 101 U/L (ref 55–135)
ALT SERPL W/O P-5'-P-CCNC: 51 U/L (ref 10–44)
ANION GAP SERPL CALC-SCNC: 12 MMOL/L (ref 8–16)
AST SERPL-CCNC: 68 U/L (ref 10–40)
BASOPHILS # BLD AUTO: 0.03 K/UL (ref 0–0.2)
BASOPHILS NFR BLD: 0.6 % (ref 0–1.9)
BILIRUB DIRECT SERPL-MCNC: 0.4 MG/DL (ref 0.1–0.3)
BILIRUB SERPL-MCNC: 0.9 MG/DL (ref 0.1–1)
BNP SERPL-MCNC: 132 PG/ML (ref 0–99)
BUN SERPL-MCNC: 17 MG/DL (ref 6–20)
CALCIUM SERPL-MCNC: 10 MG/DL (ref 8.7–10.5)
CHLORIDE SERPL-SCNC: 100 MMOL/L (ref 95–110)
CO2 SERPL-SCNC: 25 MMOL/L (ref 23–29)
CREAT SERPL-MCNC: 2.3 MG/DL (ref 0.5–1.4)
CRP SERPL-MCNC: 41.3 MG/L (ref 0–8.2)
DIFFERENTIAL METHOD BLD: ABNORMAL
EOSINOPHIL # BLD AUTO: 0.1 K/UL (ref 0–0.5)
EOSINOPHIL NFR BLD: 2.6 % (ref 0–8)
ERYTHROCYTE [DISTWIDTH] IN BLOOD BY AUTOMATED COUNT: 15.8 % (ref 11.5–14.5)
EST. GFR  (NO RACE VARIABLE): 32.3 ML/MIN/1.73 M^2
GLUCOSE SERPL-MCNC: 87 MG/DL (ref 70–110)
HCT VFR BLD AUTO: 46.6 % (ref 40–54)
HGB BLD-MCNC: 14.6 G/DL (ref 14–18)
IMM GRANULOCYTES # BLD AUTO: 0.01 K/UL (ref 0–0.04)
IMM GRANULOCYTES NFR BLD AUTO: 0.2 % (ref 0–0.5)
INR PPP: 2.3 (ref 0.8–1.2)
LDH SERPL L TO P-CCNC: 269 U/L (ref 110–260)
LYMPHOCYTES # BLD AUTO: 1.1 K/UL (ref 1–4.8)
LYMPHOCYTES NFR BLD: 24.4 % (ref 18–48)
MAGNESIUM SERPL-MCNC: 2.3 MG/DL (ref 1.6–2.6)
MCH RBC QN AUTO: 29.4 PG (ref 27–31)
MCHC RBC AUTO-ENTMCNC: 31.3 G/DL (ref 32–36)
MCV RBC AUTO: 94 FL (ref 82–98)
MONOCYTES # BLD AUTO: 0.7 K/UL (ref 0.3–1)
MONOCYTES NFR BLD: 14.5 % (ref 4–15)
NEUTROPHILS # BLD AUTO: 2.7 K/UL (ref 1.8–7.7)
NEUTROPHILS NFR BLD: 57.7 % (ref 38–73)
NRBC BLD-RTO: 0 /100 WBC
OHS CV DC REMOTE DEVICE TYPE: NORMAL
PHOSPHATE SERPL-MCNC: 2.4 MG/DL (ref 2.7–4.5)
PLATELET # BLD AUTO: 202 K/UL (ref 150–450)
PMV BLD AUTO: 10.5 FL (ref 9.2–12.9)
POTASSIUM SERPL-SCNC: 3.7 MMOL/L (ref 3.5–5.1)
PREALB SERPL-MCNC: 22 MG/DL (ref 20–43)
PROT SERPL-MCNC: 8.6 G/DL (ref 6–8.4)
PROTHROMBIN TIME: 23.7 SEC (ref 9–12.5)
RBC # BLD AUTO: 4.96 M/UL (ref 4.6–6.2)
SODIUM SERPL-SCNC: 137 MMOL/L (ref 136–145)
T4 FREE SERPL-MCNC: 1.33 NG/DL (ref 0.71–1.51)
T4 SERPL-MCNC: 8.4 UG/DL (ref 4.5–11.5)
TSH SERPL DL<=0.005 MIU/L-ACNC: 7.68 UIU/ML (ref 0.4–4)
WBC # BLD AUTO: 4.68 K/UL (ref 3.9–12.7)

## 2024-07-25 PROCEDURE — 85610 PROTHROMBIN TIME: CPT | Performed by: INTERNAL MEDICINE

## 2024-07-25 PROCEDURE — 85025 COMPLETE CBC W/AUTO DIFF WBC: CPT | Performed by: INTERNAL MEDICINE

## 2024-07-25 PROCEDURE — 84436 ASSAY OF TOTAL THYROXINE: CPT | Performed by: INTERNAL MEDICINE

## 2024-07-25 PROCEDURE — 83735 ASSAY OF MAGNESIUM: CPT | Performed by: INTERNAL MEDICINE

## 2024-07-25 PROCEDURE — 36415 COLL VENOUS BLD VENIPUNCTURE: CPT | Performed by: INTERNAL MEDICINE

## 2024-07-25 PROCEDURE — 80053 COMPREHEN METABOLIC PANEL: CPT | Performed by: INTERNAL MEDICINE

## 2024-07-25 PROCEDURE — 82248 BILIRUBIN DIRECT: CPT | Performed by: INTERNAL MEDICINE

## 2024-07-25 PROCEDURE — 86140 C-REACTIVE PROTEIN: CPT | Performed by: INTERNAL MEDICINE

## 2024-07-25 PROCEDURE — 93750 INTERROGATION VAD IN PERSON: CPT | Mod: S$GLB,,, | Performed by: INTERNAL MEDICINE

## 2024-07-25 PROCEDURE — 84100 ASSAY OF PHOSPHORUS: CPT | Performed by: INTERNAL MEDICINE

## 2024-07-25 PROCEDURE — 84443 ASSAY THYROID STIM HORMONE: CPT | Performed by: INTERNAL MEDICINE

## 2024-07-25 PROCEDURE — 83615 LACTATE (LD) (LDH) ENZYME: CPT | Performed by: INTERNAL MEDICINE

## 2024-07-25 PROCEDURE — 84439 ASSAY OF FREE THYROXINE: CPT | Performed by: INTERNAL MEDICINE

## 2024-07-25 PROCEDURE — 83880 ASSAY OF NATRIURETIC PEPTIDE: CPT | Performed by: INTERNAL MEDICINE

## 2024-07-25 PROCEDURE — 99999 PR PBB SHADOW E&M-EST. PATIENT-LVL V: CPT | Mod: PBBFAC,,, | Performed by: INTERNAL MEDICINE

## 2024-07-25 PROCEDURE — 84134 ASSAY OF PREALBUMIN: CPT | Performed by: INTERNAL MEDICINE

## 2024-07-25 NOTE — PROCEDURES
7/25/2024     1:33 PM 3/7/2024     2:17 PM 9/8/2023    10:19 AM 8/31/2023    11:48 AM 5/31/2023     2:27 PM 4/13/2023     1:27 PM 2/23/2023     1:52 PM   TXP SACHI INTERROGATIONS   Type HeartMate3 HeartMate3  HeartMate3 HeartMate3 HeartMate3 HeartMate3   Flow 5.2 5.6  5.4 5.5 5.4 5.6   Speed 6300 6300  6200 5400 6400 6400   PI 2 1.4  1.9 1.3 2.2 4.1   Power ([place]) 5.5 5.6  5.3 5.7 5.6 5.7   LSL 5900 5900  5800 6000 6000 6000   Pulsatility  No Pulse No Pulse No Pulse Pulse No Pulse Intermittent pulse   }

## 2024-07-25 NOTE — PROGRESS NOTES
[facility]    2024 1:32 PM    Assessment/Plan:    Patient presents today with therapeutic INR.    Assessment of patient findings and chart review: no significant findings     Recommendation for patient's warfarin regimen: Continue current maintenance dose    Recommend repeat INR in 1 week  _________________________________________________________________    Tim Richards (58 y.o.) is followed by the [facility].    Anticoagulation Summary  As of 2024      INR goal:  2.0-3.0   TTR:  69.9% (5.8 y)   INR used for dosin.3 (2024)   Warfarin maintenance plan:  7.5 mg (5 mg x 1.5) every Sun; 5 mg (5 mg x 1) all other days   Weekly warfarin total:  37.5 mg   Plan last modified:  Pearl Mendez, PharmD (2024)   Next INR check:  2024   Target end date:      Indications    LVAD (left ventricular assist device) present (Resolved) [Z95.811]  Anticoagulation monitoring  INR range 2-3 (Resolved) [Z79.01]                 Anticoagulation Episode Summary       INR check location:  Clinic Lab    Preferred lab:      Send INR reminders to:  [facility] COUM[facility]IN LVAD    Comments:  LVAD/ [facility] Lab/Lab [place] Results:[phone] [facility]# [id number] Phone: [phone]/dc1/ [facility] [phone],fax [phone]/ Bridge:NO(h/o bleeds) see 3/12 encounter for meter process          Anticoagulation Care Providers       Provider Role Specialty Phone number    Antonio Hadley Jr., MD Responsible Cardiology [phone]

## 2024-07-25 NOTE — PROGRESS NOTES
Subjective:   Patient ID:  Tim Richards is a 58 y.o. male who presents for LVAD followup visit.    Implant Date: Heartmate II on 3/8/2018 (outflow graft changed 3/9/2018), exchanged to Heartmate 3 on 7/13/2022       Heartmate 3 RPM 6300     INR goal: 2-3 (INR meter)  NO Bridge with heparin  Antiplatelets: ASA 81mg           7/25/2024     1:33 PM 3/7/2024     2:17 PM 9/8/2023    10:19 AM 8/31/2023    11:48 AM 5/31/2023     2:27 PM 4/13/2023     1:27 PM 2/23/2023     1:52 PM   TXP SACHI INTERROGATIONS   Type HeartMate3 HeartMate3  HeartMate3 HeartMate3 HeartMate3 HeartMate3   Flow 5.2 5.6  5.4 5.5 5.4 5.6   Speed 6300 6300  6200 5400 6400 6400   PI 2 1.4  1.9 1.3 2.2 4.1   Power (Jackson) 5.5 5.6  5.3 5.7 5.6 5.7   LSL 5900 5900  5800 6000 6000 6000   Pulsatility  No Pulse No Pulse No Pulse Pulse No Pulse Intermittent pulse       HPI  Mr. Richards is a very pleasant 59 yo black male with stage D HFrEF who was previously supported with a HM2 (implanted 3/8/2018 as DT-VAD) but required pump exchange (HM3 pump exchange 7/13/2022) for thrombosis of pump post COVID-19 PNA and AHRF. His post-op course following pump exchange was prolonged and complicated by worsening cardiogenic shock/RV failure requiring VA-ECMO. He also had recurrent VT as well as atrial flutter with RVR and is on chronic amiodarone and mexiletine. In June/July 2022 he was tapered of steroids (on for amiodarone hyperthyroid) due to concern for avascular necrosis of hip. 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 He completed course of ertapenem and voriconazole. Had new cultures obtained and is currently on Augmentin since earlier this week. He has otherwise done really well since. Today comes for his  "regular VAD visit. He has no cardiac complaints.  VAD speed is at 6300 rpm. No VAD alarms noted with few speed drops on interrogation . BP is 72. DLES is a "2". INR is therapeutic at 2.3. LDh is at baseline.     Review of Systems   Constitutional: Negative. Negative for chills, decreased appetite, diaphoresis, fever, malaise/fatigue, night sweats, weight gain and weight loss.   Eyes: Negative.    Cardiovascular:  Negative for chest pain, claudication, cyanosis, dyspnea on exertion, irregular heartbeat, leg swelling, near-syncope, orthopnea, palpitations, paroxysmal nocturnal dyspnea and syncope.   Respiratory:  Negative for cough, hemoptysis and shortness of breath.    Endocrine: Negative.    Hematologic/Lymphatic: Negative.    Skin:  Negative for color change, dry skin and nail changes.   Musculoskeletal: Negative.    Gastrointestinal: Negative.    Genitourinary: Negative.    Neurological:  Negative for weakness.       Objective:   Blood pressure (!) 72/0, height 6' 1" (1.854 m), weight 108.4 kg (239 lb).body mass index is 31.53 kg/m².    Doppler: 72    Physical Exam  Vitals reviewed.   Constitutional:       Appearance: He is well-developed.      Comments: BP (!) 72/0 (BP Location: Left arm, Patient Position: Sitting) Comment (BP Method): doppler  Ht 6' 1" (1.854 m)   Wt 108.4 kg (239 lb)   BMI 31.53 kg/m²      HENT:      Head: Normocephalic.   Neck:      Vascular: No carotid bruit or JVD.   Cardiovascular:      Heart sounds: No murmur heard.     Comments: Smooth VAD hum., DLES is "2"  Pulmonary:      Effort: Pulmonary effort is normal.      Breath sounds: Normal breath sounds.   Abdominal:      General: Bowel sounds are normal.      Palpations: Abdomen is soft.   Skin:     General: Skin is warm.   Neurological:      Mental Status: He is alert.         Lab Results   Component Value Date    WBC 4.68 07/25/2024    HGB 14.6 07/25/2024    HCT 46.6 07/25/2024    MCV 94 07/25/2024     07/25/2024    CO2 25 " 07/25/2024    CREATININE 2.3 (H) 07/25/2024    CALCIUM 10.0 07/25/2024    ALBUMIN 4.0 07/25/2024    AST 68 (H) 07/25/2024     (H) 07/25/2024    ALT 51 (H) 07/25/2024     (H) 07/25/2024       Lab Results   Component Value Date    INR 2.3 (H) 07/25/2024    INR 1.7 (H) 07/19/2024    INR 1.3 (H) 07/15/2024       BNP   Date Value Ref Range Status   07/25/2024 132 (H) 0 - 99 pg/mL Final     Comment:     Values of less than 100 pg/ml are consistent with non-CHF populations.   04/12/2024 398 (H) 0 - 99 pg/mL Final     Comment:     Values of less than 100 pg/ml are consistent with non-CHF populations.   03/25/2024 69 0 - 99 pg/mL Final     Comment:     Values of less than 100 pg/ml are consistent with non-CHF populations.       LD   Date Value Ref Range Status   07/25/2024 269 (H) 110 - 260 U/L Final     Comment:     Results are increased in hemolyzed samples.   03/07/2024 225 110 - 260 U/L Final     Comment:     Results are increased in hemolyzed samples.   12/07/2023 213 110 - 260 U/L Final     Comment:     Results are increased in hemolyzed samples.         Assessment:      1. LVAD (left ventricular assist device) present    2. Chronic combined systolic and diastolic congestive heart failure    3. Primary hypertension    4. CHICHI (obstructive sleep apnea)    5. Stage 3b chronic kidney disease    6. Dilated cardiomyopathy    7. Anticoagulation monitoring, INR range 2-3    8. Left ventricular assist device (LVAD) complication, subsequent encounter        Plan:   Patient is now NYHA class II. BP is controlled. Appears euvolemic on exam. INR is therapeutic.   VAD interrogation was performed in clinic  I have reviewed his GDMT, he is not on a ACEI/ARB/ARNI due to allergy/anaphylaxis or MRA due worsening creatinine. He is not on a beta blocker due to hypotension and RV dysfunction.   Any VAD management kits dispensed today medically necessary  Recommend 2 gram sodium restriction and 1500cc fluid  restriction.  Encourage physical activity with graded exercise program.  Requested patient to weigh themselves daily, and to notify us if their weight increases by more than 3 lbs in 1 day or 5 lbs in 1 week.   Additionally, the patient has a patient centered goal of being able to get better  RTC in 4 months   Refer to sleep specialist     Patient advised that it is recommended that all patients, and their close contacts and household members receive Covid vaccination.     Listed for transplant: No. Implanted as DT.     UNOS Patient Status  Functional Status: 50% - Requires considerable assistance and frequent medical care  Physical Capacity: No Limitations  Working for Income: Unknown     Guerda Bautista MD

## 2024-07-25 NOTE — PROGRESS NOTES
Pt presented for 24 month follow-up and verbalized consent and willingness to continue to participate with the INTERMACS registry.      Patient completed the EQ-5D quality of life questionnaire, KCCQ, and the Trail Making neurocognitive test in 122 seconds.

## 2024-07-25 NOTE — LETTER
July 25, 2024        Nader Chiu  [address] [place], [place] [address]  [address]  [place] [address]  Phone: [phone]  Fax: [phone]     Nader Chiu  [address] [place] [address]  Phone: [phone]  Fax: [phone]             [facility]  [address]  [place] [address]  Phone: [phone]   Patient: Tim Richards   MR Number: [id number]   YOB: 1965   Date of Visit: 7/25/2024       Dear Dr. Nader Chiu, Nader Chiu    Thank you for referring Tim Richards to me for evaluation. Attached you will find relevant portions of my assessment and plan of care.    If you have questions, please do not hesitate to call me. I look forward to following Tim Richards along with you.    Sincerely,    Guerda Bautista MD    Enclosure    If you would like to receive this communication electronically, please contact [email or web address] or [phone] to request HeartFlow Link access.    HeartFlow Link is a tool which provides read-only access to select patient information with whom you have a relationship. Its easy to use and provides real time access to review your patients record including encounter summaries, notes, results, and demographic information.    If you feel you have received this communication in error or would no longer like to receive these types of communications, please e-mail [email or web address]

## 2024-07-25 NOTE — PROGRESS NOTES
Date of Implant with Heartmate 3 LVAD: 7/13/2022    PATIENT ARRIVED IN CLINIC:  Ambulatory   Accompanied by: spouse  Complaints/reason for visit today: routine    Vitals  Temperature, oral:   Temp Readings from Last 1 Encounters:   07/08/24 98.5 °F (36.9 °C) (Oral)     Blood Pressure:   BP Readings from Last 3 Encounters:   07/25/24 (!) 72/0   07/08/24 (!) 78/0   03/07/24 (!) 84/0        VAD Interrogation:      7/25/2024     1:33 PM 3/7/2024     2:17 PM 9/8/2023    10:19 AM   TXP SACHI INTERROGATIONS   Type HeartMate3 HeartMate3    Flow 5.2 5.6    Speed 6300 6300    PI 2 1.4    Power (Jackson) 5.5 5.6    LSL 5900 5900    Pulsatility  No Pulse No Pulse     HCT:   Lab Results   Component Value Date    HCT 46.6 07/25/2024    HCT 31 (L) 09/27/2022       VAD Assessment  Problems / Issues /Equipment Needs/ Alarms with VAD if any: None noted  VAD Sounds: HM3 Smooth  Emergency Equipment With Patient: Yes   Equipment Needs: Yes   It is medically necessary to ensure patient has properly functioning equipment and wearables to prevent infection, injury or death to patient.     DLES Assessment and Dressing Care:  Appearance of DLES: 2  Antibiotics: YES augmentin  Velour: No  Manual & Visual Inspection Of Driveline: Old rescue tape noted  Stabilization Device In Use: yes, sun securement device    Heartmate 3 Module Cable:  No yellow exposed and Attempted to unscrew modular cable to ensure it will be able to come lose in the event we ever need to change the modular cable while patient held the driveline in place so it would not move. Modular cable connection able to be unscrewed and re-tightened. Instructed pt to perform this weekly.  Frequency of Dressing Changes: daily & daily kit , changing to silver kits  Pt In Need Of Management Kits?:yes -   3 Box of silverlon kit  It is medically necessary to have VAD management kits in order to prevent infection or to assist in the healing of an infected DLES.      Assessment/ Quality of  Life Survery:   Complaints Of Nausea / Vomiting: None noted    Appearance and Frequency Of Stools: normal and formed without blood & daily  Color Of Urine: clear/yellow  Pain: NO  Coping/Depression/Anxiety: coping okay  Sleep Habits: 7-8 hrs /night  Sleep Aids:  remeron and ambien  Showering: Yes, reminded to change dressing immediately after drying off  Self- Care I have no problems with self- care  Mobility I have some problems in walking about  Usual Activities I have some problems with performing my usual activities  Activity/Exercise: walking   Driving: Yes. Reminded to pull over should there be an alarm before looking down at controller.  Additional Comments: n/a    Labs:    Chemistry        Component Value Date/Time     07/25/2024 1201    K 3.7 07/25/2024 1201     07/25/2024 1201    CO2 25 07/25/2024 1201    BUN 17 07/25/2024 1201    CREATININE 2.3 (H) 07/25/2024 1201    GLU 87 07/25/2024 1201        Component Value Date/Time    CALCIUM 10.0 07/25/2024 1201    ALKPHOS 101 07/25/2024 1201    AST 68 (H) 07/25/2024 1201    ALT 51 (H) 07/25/2024 1201    BILITOT 0.9 07/25/2024 1201    ESTGFRAFRICA 37.6 (A) 07/31/2022 2027    EGFRNONAA 32.5 (A) 07/31/2022 2027            Magnesium   Date Value Ref Range Status   07/25/2024 2.3 1.6 - 2.6 mg/dL Final       Lab Results   Component Value Date    WBC 4.68 07/25/2024    HGB 14.6 07/25/2024    HCT 46.6 07/25/2024    MCV 94 07/25/2024     07/25/2024       Lab Results   Component Value Date    INR 2.3 (H) 07/25/2024    INR 1.7 (H) 07/19/2024    INR 1.3 (H) 07/15/2024       BNP   Date Value Ref Range Status   07/25/2024 132 (H) 0 - 99 pg/mL Final     Comment:     Values of less than 100 pg/ml are consistent with non-CHF populations.   04/12/2024 398 (H) 0 - 99 pg/mL Final     Comment:     Values of less than 100 pg/ml are consistent with non-CHF populations.   03/25/2024 69 0 - 99 pg/mL Final     Comment:     Values of less than 100 pg/ml are consistent  with non-CHF populations.       LD   Date Value Ref Range Status   07/25/2024 269 (H) 110 - 260 U/L Final     Comment:     Results are increased in hemolyzed samples.   03/07/2024 225 110 - 260 U/L Final     Comment:     Results are increased in hemolyzed samples.   12/07/2023 213 110 - 260 U/L Final     Comment:     Results are increased in hemolyzed samples.       Labs reviewed with patient: YES     Medication Reconciliation: per MA.  New Medication Detail Provided: yes  Coumadin Managed by: [facility]    Education: Reviewed driveline care, emergency procedures, how to change the controller, alarms with patient, as well as discussed how to page the VAD coordinator in case of an emergency.   Educated patient/family that chest compressions are allowed in the event they are needed.    Reminded patient/caregiver not to touch their face and to cover their mouth when they cough or sneeze.   Vaccines: Pt informed that we are encouraging all VAD patients to receive  vaccines and boosters. Informed pt that they can take tylenol but should avoid other NSAIDs.       Plans/Needs: Routine f/u w/ Dr Bautista. Patient reports feeling well. Seen earlier this week for DLES drainage, started on augmentin. Reports some continued lower extremity weakness. Order placed for outpatient physical therapy. Patient reports he was told several years ago he needed a CPAP but never received one, no recent sleep study. Referred to sleep medicine. Reviewed silver dressing protocol with patient and caregiver. Would like to change to silver dressings. Ordered today.    Issued new VAD patient accessory kit lot# [id number]    RTC 4 months w/ routine echo, FLP, PFT's & CXR       Hurricane Season: Yes, discussed with patient: With hurricane season approaching, we want to make sure you are fully prepared for any emergency.  Should the National Weather Service or your local authorities recommend a voluntary or mandatory evacuation of your area,  The VAD team requires you to evacuate to a safe place.  Remember, when it is a mandatory evacuation, traffic will become an issue for your limited battery power.  Therefore, we strongly urge you to evacuate early.      The VAD team advises you to have the following in place before hurricane season:  Have an evacuation plan in place including places to evacuate, names and phone numbers.  This information is required to be given to the VAD coordinator.  Have your VAD emergency contact numbers with you.  Make sure your prescriptions will not run out by the end of September.  Make sure you have enough medications, including pills, inhalers, patches,     etc. to take, should you be gone for more than 2 weeks.  Make sure ALL of your batteries are fully charged.  Bring enough dressing change supplies to last for at least 2 weeks.  Bring your VAD binder with you.  Make sure your binder is updated and complete with alarms reference card, patient hand book, emergency contact numbers, daily log sheets, etc.    If you do not have family or friends as an evacuation destination, we recommend evacuating to a safe area.   Do NOT evacuate to [facility].  The VAD team wants to stress the importance of planning for your evacuation in the event of a hurricane.  If you have any LVAD questions or issues, please contact the LVAD coordinator.

## 2024-08-01 ENCOUNTER — ANTI-COAG VISIT (OUTPATIENT)
Dept: CARDIOLOGY | Facility: CLINIC | Age: 59
End: 2024-08-01
Payer: MEDICARE

## 2024-08-01 ENCOUNTER — LAB VISIT (OUTPATIENT)
Dept: LAB | Facility: HOSPITAL | Age: 59
End: 2024-08-01
Attending: INTERNAL MEDICINE
Payer: MEDICARE

## 2024-08-01 DIAGNOSIS — Z95.811 HEART REPLACED BY HEART ASSIST DEVICE: ICD-10-CM

## 2024-08-01 LAB
INR PPP: 2.1 (ref 0.8–1.2)
PROTHROMBIN TIME: 22.1 SEC (ref 9–12.5)

## 2024-08-01 PROCEDURE — 36415 COLL VENOUS BLD VENIPUNCTURE: CPT | Performed by: INTERNAL MEDICINE

## 2024-08-01 PROCEDURE — 85610 PROTHROMBIN TIME: CPT | Performed by: INTERNAL MEDICINE

## 2024-08-01 NOTE — PROGRESS NOTES
[facility]    2024 4:17 PM    Assessment/Plan:    Patient presents today with therapeutic INR.    Assessment of patient findings and chart review: no significant findings     Recommendation for patient's warfarin regimen: Continue current maintenance dose    Recommend repeat INR in 1 week  _________________________________________________________________    Tim Richards (58 y.o.) is followed by the [facility].    Anticoagulation Summary  As of 2024      INR goal:  2.0-3.0   TTR:  70.0% (5.8 y)   INR used for dosin.1 (2024)   Warfarin maintenance plan:  7.5 mg (5 mg x 1.5) every Sun; 5 mg (5 mg x 1) all other days   Weekly warfarin total:  37.5 mg   Plan last modified:  Pearl Mendez, PharmD (2024)   Next INR check:  2024   Target end date:      Indications    LVAD (left ventricular assist device) present (Resolved) [Z95.811]  Anticoagulation monitoring  INR range 2-3 (Resolved) [Z79.01]                 Anticoagulation Episode Summary       INR check location:  Clinic Lab    Preferred lab:      Send INR reminders to:  [facility] LVAD    Comments:  LVAD/ [facility] Lab/Lab [place] Results:[phone] [facility]# [id number] Phone: [phone] [phone]/dc1/ [facility] [phone],fax [phone]/ Bridge:NO(h/o bleeds) see 3/12 encounter for meter process          Anticoagulation Care Providers       Provider Role Specialty Phone number    Antonio Hadley Jr., MD Responsible Cardiology [phone]

## 2024-08-08 ENCOUNTER — LAB VISIT (OUTPATIENT)
Dept: LAB | Facility: HOSPITAL | Age: 59
End: 2024-08-08
Attending: INTERNAL MEDICINE
Payer: MEDICARE

## 2024-08-08 ENCOUNTER — ANTI-COAG VISIT (OUTPATIENT)
Dept: CARDIOLOGY | Facility: CLINIC | Age: 59
End: 2024-08-08
Payer: MEDICARE

## 2024-08-08 DIAGNOSIS — Z95.811 HEART REPLACED BY HEART ASSIST DEVICE: ICD-10-CM

## 2024-08-08 LAB
INR PPP: 2.2 (ref 0.8–1.2)
PROTHROMBIN TIME: 22.6 SEC (ref 9–12.5)

## 2024-08-08 PROCEDURE — 85610 PROTHROMBIN TIME: CPT | Performed by: INTERNAL MEDICINE

## 2024-08-08 PROCEDURE — 36415 COLL VENOUS BLD VENIPUNCTURE: CPT | Performed by: INTERNAL MEDICINE

## 2024-08-09 ENCOUNTER — PATIENT MESSAGE (OUTPATIENT)
Dept: TRANSPLANT | Facility: CLINIC | Age: 59
End: 2024-08-09
Payer: MEDICARE

## 2024-08-12 ENCOUNTER — TELEPHONE (OUTPATIENT)
Dept: ELECTROPHYSIOLOGY | Facility: CLINIC | Age: 59
End: 2024-08-12
Payer: MEDICARE

## 2024-08-12 NOTE — TELEPHONE ENCOUNTER
----- Message from Marielle Brown sent at 8/12/2024  2:32 PM CDT -----  Regarding: call back  Type: Patient Call Back    Who called: pt     What is the request in detail: requesting call to discuss referral to pulmonology, unsure if he needs to be seen by provider in that dept or not     Can the clinic reply by [facility]?no    Would the patient rather a call back or a response via [facility]? call    Best call back number: [phone]     Additional Information:

## 2024-08-15 ENCOUNTER — LAB VISIT (OUTPATIENT)
Dept: LAB | Facility: HOSPITAL | Age: 59
End: 2024-08-15
Attending: INTERNAL MEDICINE
Payer: MEDICARE

## 2024-08-15 ENCOUNTER — ANTI-COAG VISIT (OUTPATIENT)
Dept: CARDIOLOGY | Facility: CLINIC | Age: 59
End: 2024-08-15
Payer: MEDICARE

## 2024-08-15 DIAGNOSIS — Z95.811 HEART REPLACED BY HEART ASSIST DEVICE: ICD-10-CM

## 2024-08-15 LAB
INR PPP: 3 (ref 0.8–1.2)
PROTHROMBIN TIME: 30.3 SEC (ref 9–12.5)

## 2024-08-15 PROCEDURE — 36415 COLL VENOUS BLD VENIPUNCTURE: CPT | Performed by: INTERNAL MEDICINE

## 2024-08-15 PROCEDURE — 85610 PROTHROMBIN TIME: CPT | Performed by: INTERNAL MEDICINE

## 2024-08-15 NOTE — PROGRESS NOTES
[facility]    08/15/2024 3:27 PM    Assessment/Plan:    Patient presents today with therapeutic INR.    Assessment of patient findings and chart review: no significant findings     Recommendation for patient's warfarin regimen: Continue current maintenance dose    Recommend repeat INR in 1 week  _________________________________________________________________    Tim Richards (58 y.o.) is followed by the [facility].    Anticoagulation Summary  As of 8/15/2024      INR goal:  2.0-3.0   TTR:  70.2% (5.8 y)   INR used for dosing:  3.0 (8/15/2024)   Warfarin maintenance plan:  7.5 mg (5 mg x 1.5) every Sun; 5 mg (5 mg x 1) all other days   Weekly warfarin total:  37.5 mg   Plan last modified:  Pearl Mendez, PharmD (7/8/2024)   Next INR check:  8/22/2024   Target end date:      Indications    LVAD (left ventricular assist device) present (Resolved) [Z95.811]  Anticoagulation monitoring  INR range 2-3 (Resolved) [Z79.01]                 Anticoagulation Episode Summary       INR check location:  Clinic Lab    Preferred lab:      Send INR reminders to:  [facility] LVAD    Comments:  LVAD/ [facility] Lab/Lab [place] Results:[phone] [facility]# [id number] Phone: [phone] [phone]/dc1/16/23 [facility] [phone],fax [phone]/ Bridge:NO(h/o bleeds) see 3/12 encounter for meter process          Anticoagulation Care Providers       Provider Role Specialty Phone number    Antonio Hadley Jr., MD Responsible Cardiology [phone]

## 2024-08-16 ENCOUNTER — HOSPITAL ENCOUNTER (OUTPATIENT)
Dept: PULMONOLOGY | Facility: CLINIC | Age: 59
Discharge: HOME | End: 2024-08-16
Payer: MEDICARE

## 2024-08-16 DIAGNOSIS — I42.0 DILATED CARDIOMYOPATHY: ICD-10-CM

## 2024-08-16 DIAGNOSIS — Z95.811 LVAD (LEFT VENTRICULAR ASSIST DEVICE) PRESENT: Chronic | ICD-10-CM

## 2024-08-16 LAB
DLCO ADJ PRE: 15.1 ML/(MIN*MMHG) (ref 25.35–39.2)
DLCO SINGLE BREATH LLN: 25.35
DLCO SINGLE BREATH PRE REF: 46.8 %
DLCO SINGLE BREATH REF: 32.27
DLCOC SBVA LLN: 3.08
DLCOC SBVA PRE REF: 87.8 %
DLCOC SBVA REF: 4.17
DLCOC SINGLE BREATH LLN: 25.35
DLCOC SINGLE BREATH PRE REF: 46.8 %
DLCOC SINGLE BREATH REF: 32.27
DLCOCSBVAULN: 5.26
DLCOCSINGLEBREATHULN: 39.2
DLCOCSINGLEBREATHZSCORE: -4.08
DLCOSINGLEBREATHULN: 39.2
DLCOSINGLEBREATHZSCORE: -4.08
DLCOVA LLN: 3.08
DLCOVA PRE REF: 87.8 %
DLCOVA PRE: 3.66 ML/(MIN*MMHG*L) (ref 3.08–5.26)
DLCOVA REF: 4.17
DLCOVAULN: 5.26
DLVAADJ PRE: 3.66 ML/(MIN*MMHG*L) (ref 3.08–5.26)
ERV LLN: -16448.69
ERV PRE REF: 84.1 %
ERV REF: 1.31
ERVULN: ABNORMAL
FEF 25 75 LLN: 2.14
FEF 25 75 PRE REF: 39.7 %
FEF 25 75 REF: 3.85
FEV05 LLN: 2.01
FEV05 REF: 3.15
FEV1 FVC LLN: 67
FEV1 FVC PRE REF: 96.1 %
FEV1 FVC REF: 78
FEV1 LLN: 2.52
FEV1 PRE REF: 57.4 %
FEV1 REF: 3.44
FEV1FVCZSCORE: -0.47
FEV1ZSCORE: -2.56
FRCPLETH LLN: 2.77
FRCPLETH PREREF: 77.3 %
FRCPLETH REF: 3.76
FRCPLETHULN: 4.75
FVC LLN: 3.31
FVC PRE REF: 59.6 %
FVC REF: 4.42
FVCZSCORE: -2.65
IVC PRE: 2.59 L (ref 3.31–5.55)
IVC SINGLE BREATH LLN: 3.31
IVC SINGLE BREATH PRE REF: 58.6 %
IVC SINGLE BREATH REF: 4.42
IVCSINGLEBREATHULN: 5.55
LLN IC: -9999996.24
PEF LLN: 6.41
PEF PRE REF: 53.1 %
PEF REF: 9.3
PHYSICIAN COMMENT: ABNORMAL
PRE DLCO: 15.1 ML/(MIN*MMHG) (ref 25.35–39.2)
PRE ERV: 1.1 L (ref -16448.69–16451.31)
PRE FEF 25 75: 1.53 L/S (ref 2.14–5.56)
PRE FET 100: 5.84 SEC
PRE FEV05 REF: 48 %
PRE FEV1 FVC: 74.88 % (ref 66.7–87.75)
PRE FEV1: 1.97 L (ref 2.52–4.31)
PRE FEV5: 1.51 L (ref 2.01–4.28)
PRE FRC PL: 2.91 L (ref 2.77–4.75)
PRE FVC: 2.64 L (ref 3.31–5.55)
PRE IC: 1.53 L (ref -9999996.24–#######.####)
PRE PEF: 4.94 L/S (ref 6.41–12.18)
PRE REF IC: 40.9 %
PRE RV: 1.81 L (ref 1.78–3.13)
PRE TLC: 4.44 L (ref 6.58–8.89)
RAW PRE REF: 135 %
RAW PRE: 4.13 CMH2O*S/L (ref 3.06–3.06)
RAW REF: 3.06
REF IC: 3.76
RV LLN: 1.78
RV PRE REF: 73.7 %
RV REF: 2.45
RVTLC LLN: 27
RVTLC PRE REF: 112.4 %
RVTLC PRE: 40.68 % (ref 27.21–45.17)
RVTLC REF: 36
RVTLCULN: 45
RVULN: 3.13
SGAW PRE REF: 88.8 %
SGAW PRE: 0.07 1/(CMH2O*S) (ref 0.08–0.08)
SGAW REF: 0.08
TLC LLN: 6.58
TLC PRE REF: 57.5 %
TLC REF: 7.74
TLC ULN: 8.89
TLCZSCORE: -4.7
ULN IC: ABNORMAL
VA PRE: 4.12 L (ref 7.59–7.59)
VA SINGLE BREATH LLN: 7.59
VA SINGLE BREATH PRE REF: 54.3 %
VA SINGLE BREATH REF: 7.59
VASINGLEBREATHULN: 7.59
VC LLN: 3.31
VC PRE REF: 59.6 %
VC PRE: 2.64 L (ref 3.31–5.55)
VC REF: 4.42
VC ULN: 5.55

## 2024-08-16 PROCEDURE — 94726 PLETHYSMOGRAPHY LUNG VOLUMES: CPT | Mod: S$GLB,,, | Performed by: INTERNAL MEDICINE

## 2024-08-16 PROCEDURE — 94010 BREATHING CAPACITY TEST: CPT | Mod: S$GLB,,, | Performed by: INTERNAL MEDICINE

## 2024-08-16 PROCEDURE — 94729 DIFFUSING CAPACITY: CPT | Mod: S$GLB,,, | Performed by: INTERNAL MEDICINE

## 2024-08-19 ENCOUNTER — TELEPHONE (OUTPATIENT)
Dept: TRANSPLANT | Facility: CLINIC | Age: 59
End: 2024-08-19
Payer: MEDICARE

## 2024-08-19 NOTE — TELEPHONE ENCOUNTER
Patient requested appt d/t having difficulty w/ dressings and anchors. Left  message for call back.

## 2024-08-20 NOTE — TELEPHONE ENCOUNTER
Attempting to contact patient regarding appt request d/t trouble with anchors and driveline dressing. Left message for call back.    Spoke with spouse, having difficulty anchoring driveline appropriately. Patient is going to come to clinic tomorrow to  tegaderm to place under anchors. Spouse to notify VC if this does not help.

## 2024-08-23 ENCOUNTER — ANTI-COAG VISIT (OUTPATIENT)
Dept: CARDIOLOGY | Facility: CLINIC | Age: 59
End: 2024-08-23
Payer: MEDICARE

## 2024-08-23 ENCOUNTER — LAB VISIT (OUTPATIENT)
Dept: LAB | Facility: HOSPITAL | Age: 59
End: 2024-08-23
Attending: INTERNAL MEDICINE
Payer: MEDICARE

## 2024-08-23 DIAGNOSIS — Z95.811 HEART REPLACED BY HEART ASSIST DEVICE: ICD-10-CM

## 2024-08-23 LAB
INR PPP: 3.2 (ref 0.8–1.2)
PROTHROMBIN TIME: 32.6 SEC (ref 9–12.5)

## 2024-08-23 PROCEDURE — 85610 PROTHROMBIN TIME: CPT | Performed by: INTERNAL MEDICINE

## 2024-08-23 PROCEDURE — 36415 COLL VENOUS BLD VENIPUNCTURE: CPT | Performed by: INTERNAL MEDICINE

## 2024-08-23 NOTE — PROGRESS NOTES
Patient reports correct Warfarin dose, stopped Remeron 1 week ago, no other changes reported.

## 2024-08-29 ENCOUNTER — LAB VISIT (OUTPATIENT)
Dept: LAB | Facility: HOSPITAL | Age: 59
End: 2024-08-29
Attending: INTERNAL MEDICINE
Payer: MEDICARE

## 2024-08-29 ENCOUNTER — ANTI-COAG VISIT (OUTPATIENT)
Dept: CARDIOLOGY | Facility: CLINIC | Age: 59
End: 2024-08-29
Payer: MEDICARE

## 2024-08-29 DIAGNOSIS — Z95.811 HEART REPLACED BY HEART ASSIST DEVICE: ICD-10-CM

## 2024-08-29 LAB
INR PPP: 2.6 (ref 0.8–1.2)
PROTHROMBIN TIME: 26.9 SEC (ref 9–12.5)

## 2024-08-29 PROCEDURE — 36415 COLL VENOUS BLD VENIPUNCTURE: CPT | Performed by: INTERNAL MEDICINE

## 2024-08-29 PROCEDURE — 85610 PROTHROMBIN TIME: CPT | Performed by: INTERNAL MEDICINE

## 2024-08-29 NOTE — PROGRESS NOTES
[facility]    2024 1:57 PM    Assessment/Plan:    Patient presents today with therapeutic INR.    Assessment of patient findings and chart review: no significant findings     Recommendation for patient's warfarin regimen: Continue current maintenance dose    Recommend repeat INR in 1 week  _________________________________________________________________    Tim Richards (58 y.o.) is followed by the [facility].    Anticoagulation Summary  As of 2024      INR goal:  2.0-3.0   TTR:  69.9% (5.9 y)   INR used for dosin.6 (2024)   Warfarin maintenance plan:  7.5 mg (5 mg x 1.5) every Sun; 5 mg (5 mg x 1) all other days   Weekly warfarin total:  37.5 mg   Plan last modified:  Pearl Mendez, PharmD (2024)   Next INR check:  2024   Target end date:      Indications    LVAD (left ventricular assist device) present (Resolved) [Z95.811]  Anticoagulation monitoring  INR range 2-3 (Resolved) [Z79.01]                 Anticoagulation Episode Summary       INR check location:  Clinic Lab    Preferred lab:      Send INR reminders to:  [facility] LVAD    Comments:  LVAD/ [facility] [place] Results:[phone] [facility]# [id number] Phone: [phone] [phone]/dc1/ [facility] [phone],fax [phone]/ Bridge:NO(h/o bleeds) see 3/12 encounter for meter process          Anticoagulation Care Providers       Provider Role Specialty Phone number    Antonio Hadley Jr., MD Responsible Cardiology [phone]

## 2024-08-30 NOTE — PROGRESS NOTES
08/15/2022  Carissa Dean    Current provider:  Amy Rhodes MD    Device interrogation:  TXP LVAD INTERROGATIONS 8/15/2022 8/15/2022 8/15/2022 8/15/2022 8/15/2022 8/15/2022 8/15/2022   Type HeartMate3 HeartMate3 HeartMate3 - - - HeartMate3   Flow 5.4 5.3 5.3 5.3 5.5 5.3 5.4   Speed 6300 6300 6300 6300 6300 6300 6300   PI 1.7 3.4 3.5 3.1 2.9 3.4 3.5   Power (Jackson) 5.4 5.3 5.3 5.3 5.4 5.4 5.3   LSL 5900 5900 5900 5900 5900 5900 5900   Low Flow Alarm - - - - - - -   Pulsatility Intermittent pulse Intermittent pulse Intermittent pulse - - - Intermittent pulse          Rounded on Tim Richards to ensure all mechanical assist device settings (IABP or VAD) were appropriate and all parameters were within limits.  I was able to ensure all back up equipment was present, the staff had no issues, and the Perfusion Department daily rounding was complete.      For implantable VADs: Interrogation of Ventricular assist device was performed with analysis of device parameters and review of device function. I have personally reviewed the interrogation findings and agree with findings as stated.     In emergency, the nursing units have been notified to contact the perfusion department either by:  Calling [phone] from 630am to 4pm Mon thru Fri, utilizing the On-Call Finder functionality of Epic and searching for Perfusion, or by contacting the hospital  from 4pm to 630am and on weekends and asking to speak with the perfusionist on call.    10:03 AM     Self

## 2024-09-05 ENCOUNTER — LAB VISIT (OUTPATIENT)
Dept: LAB | Facility: HOSPITAL | Age: 59
End: 2024-09-05
Attending: INTERNAL MEDICINE
Payer: MEDICARE

## 2024-09-05 ENCOUNTER — ANTI-COAG VISIT (OUTPATIENT)
Dept: CARDIOLOGY | Facility: CLINIC | Age: 59
End: 2024-09-05
Payer: MEDICARE

## 2024-09-05 DIAGNOSIS — Z95.811 HEART REPLACED BY HEART ASSIST DEVICE: ICD-10-CM

## 2024-09-05 LAB
INR PPP: 3.9 (ref 0.8–1.2)
PROTHROMBIN TIME: 39.6 SEC (ref 9–12.5)

## 2024-09-05 PROCEDURE — 85610 PROTHROMBIN TIME: CPT | Performed by: INTERNAL MEDICINE

## 2024-09-05 PROCEDURE — 36415 COLL VENOUS BLD VENIPUNCTURE: CPT | Performed by: INTERNAL MEDICINE

## 2024-09-05 NOTE — PROGRESS NOTES
Patient reports correct Warfarin dose, hasn't been drinking much and may be dehydrated, sob today(didn't notify LVAD Team), no other changes reported.

## 2024-09-05 NOTE — PROGRESS NOTES
[facility]    09/05/2024 3:16 PM    Assessment/Plan:    Patient presents today with supratherapeutic INR.    Assessment of patient findings and chart review: reports SOB - will advise to contact VAD team     Recommendation for patient's warfarin regimen: Hold dose today then decrease maintenance dose    Recommend repeat INR Monday  _________________________________________________________________    Tim Richards (58 y.o.) is followed by the [facility].    Anticoagulation Summary  As of 9/5/2024      INR goal:  2.0-3.0   TTR:  69.8% (5.9 y)   INR used for dosing:  3.9 (9/5/2024)   Warfarin maintenance plan:  5 mg (5 mg x 1) every day   Weekly warfarin total:  35 mg   Plan last modified:  Pearl Mendez, PharmD (9/5/2024)   Next INR check:  9/9/2024   Target end date:      Indications    LVAD (left ventricular assist device) present (Resolved) [Z95.811]  Anticoagulation monitoring  INR range 2-3 (Resolved) [Z79.01]                 Anticoagulation Episode Summary       INR check location:  Clinic Lab    Preferred lab:      Send INR reminders to:  [facility] COUMADIN LVAD    Comments:  LVAD/ [facility]/Lab [place] Results:[phone] Acc# [id number] Phone: [phone]/dc1/16/23 [facility] -[phone],fax [phone]/ Bridge:NO(h/o bleeds) see 3/12 encounter for meter process          Anticoagulation Care Providers       Provider Role Specialty Phone number    Antonio Hadley Jr., MD [facility] Cardiology [phone]

## 2024-09-10 ENCOUNTER — LAB VISIT (OUTPATIENT)
Dept: LAB | Facility: HOSPITAL | Age: 59
End: 2024-09-10
Attending: INTERNAL MEDICINE
Payer: MEDICARE

## 2024-09-10 ENCOUNTER — ANTI-COAG VISIT (OUTPATIENT)
Dept: CARDIOLOGY | Facility: CLINIC | Age: 59
End: 2024-09-10
Payer: MEDICARE

## 2024-09-10 DIAGNOSIS — Z95.811 HEART REPLACED BY HEART ASSIST DEVICE: ICD-10-CM

## 2024-09-10 LAB
INR PPP: 2.7 (ref 0.8–1.2)
PROTHROMBIN TIME: 27.7 SEC (ref 9–12.5)

## 2024-09-10 PROCEDURE — 93793 ANTICOAG MGMT PT WARFARIN: CPT | Mod: S$GLB,,,

## 2024-09-10 PROCEDURE — 85610 PROTHROMBIN TIME: CPT | Performed by: INTERNAL MEDICINE

## 2024-09-10 PROCEDURE — 36415 COLL VENOUS BLD VENIPUNCTURE: CPT | Performed by: INTERNAL MEDICINE

## 2024-09-15 NOTE — TELEPHONE ENCOUNTER
No care due was identified.  [facility] Embedded Care Due Messages. Reference number: [id number].   9/15/2024 6:53:12 AM CDT

## 2024-09-16 RX ORDER — ZOLPIDEM TARTRATE 12.5 MG/1
12.5 TABLET, FILM COATED, EXTENDED RELEASE ORAL NIGHTLY PRN
Qty: 30 TABLET | Refills: 5 | Status: SHIPPED | OUTPATIENT
Start: 2024-09-16 | End: 2025-03-17

## 2024-09-17 ENCOUNTER — ANTI-COAG VISIT (OUTPATIENT)
Dept: CARDIOLOGY | Facility: CLINIC | Age: 59
End: 2024-09-17
Payer: MEDICARE

## 2024-09-17 ENCOUNTER — LAB VISIT (OUTPATIENT)
Dept: LAB | Facility: HOSPITAL | Age: 59
End: 2024-09-17
Attending: INTERNAL MEDICINE
Payer: MEDICARE

## 2024-09-17 DIAGNOSIS — Z95.811 HEART REPLACED BY HEART ASSIST DEVICE: ICD-10-CM

## 2024-09-17 LAB
INR PPP: 3.8 (ref 0.8–1.2)
PROTHROMBIN TIME: 37.9 SEC (ref 9–12.5)

## 2024-09-17 PROCEDURE — 36415 COLL VENOUS BLD VENIPUNCTURE: CPT | Performed by: INTERNAL MEDICINE

## 2024-09-17 PROCEDURE — 85610 PROTHROMBIN TIME: CPT | Performed by: INTERNAL MEDICINE

## 2024-09-17 PROCEDURE — 93793 ANTICOAG MGMT PT WARFARIN: CPT | Mod: S$GLB,,,

## 2024-09-17 NOTE — PROGRESS NOTES
[facility]    09/17/2024 2:49 PM    Assessment/Plan:    Patient presents today with supratherapeutic INR.    Assessment of patient findings and chart review: took higher dose than advised      Recommendation for patient's warfarin regimen: Hold dose today then resume current maintenance dose    Recommend repeat INR Thursday  _________________________________________________________________    Tim Richards (58 y.o.) is followed by the [facility].    Anticoagulation Summary  As of 9/17/2024      INR goal:  2.0-3.0   TTR:  69.5% (5.9 y)   INR used for dosing:     Warfarin maintenance plan:  5 mg (5 mg x 1) every day   Weekly warfarin total:  35 mg   Plan last modified:  Pearl Mendez, PharmD (9/5/2024)   Next INR check:  9/19/2024   Target end date:      Indications    LVAD (left ventricular assist device) present (Resolved) [Z95.811]  Anticoagulation monitoring  INR range 2-3 (Resolved) [Z79.01]                 Anticoagulation Episode Summary       INR check location:  Clinic Lab    Preferred lab:      Send INR reminders to:  [facility] COUMADIN LVAD    Comments:  LVAD/ [facility] Lab/Lab [place] Results:[phone] Acct# [id number] Phone: [phone] [phone]/dc1/16/23 [facility] [phone],fax [phone]/ Bridge:NO(h/o bleeds) see 3/12 encounter for meter process          Anticoagulation Care Providers       Provider Role Specialty Phone number    Antonio Hadley Jr., MD [facility] Cardiology [phone]

## 2024-09-17 NOTE — PROGRESS NOTES
Patient reports correct Warfarin dose, took Warfarin 7.5mg 9/15/24 by mistake, busted his gum/lip 9/11/24, had some bleeding from the lip/gum injury, denies falling, no other changes reported.

## 2024-09-18 NOTE — ASSESSMENT & PLAN NOTE
[facility] [place]  [address]  [facility] [address]  Dept: [phone]       Laurie Blair :1951 MRN:[id number]    Procedure: Device Interrogation Including analysis of device parameters  Current Settings: Ventricular Assist Device  Review of device function is stable    TXP LVAD INTERROGATIONS 7/8/2022 7/8/2022 7/8/2022 7/8/2022 7/8/2022 7/8/2022 7/8/2022   Type HeartMate II HeartMate II HeartMate II HeartMate II HeartMate II HeartMate II HeartMate II   Flow 8.5 8.5 7.5 7.4 7.9 7.8 7.2   Speed 9800 9800 9800 9800 9800 9800 9800   PI 2.5 2.5 2.9 2.7 2.6 2.6 2.7   Power (Jackson) 8.1 8.2 7.7 7.6 7.9 7.8 7.7   LSL 9400 9400 9400 9400 9400 9400 9400   Pulsatility Intermittent pulse Intermittent pulse Intermittent pulse Intermittent pulse Intermittent pulse Intermittent pulse Intermittent pulse      Merry Rice LCSW
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
## 2024-09-20 NOTE — PROGRESS NOTES
9/20-Carmen from [facility] said the pt's blood was clotted so they were unable to get his 9/20 INR results.

## 2024-09-24 ENCOUNTER — ANTI-COAG VISIT (OUTPATIENT)
Dept: CARDIOLOGY | Facility: CLINIC | Age: 59
End: 2024-09-24
Payer: MEDICARE

## 2024-09-24 ENCOUNTER — LAB VISIT (OUTPATIENT)
Dept: LAB | Facility: HOSPITAL | Age: 59
End: 2024-09-24
Attending: INTERNAL MEDICINE
Payer: MEDICARE

## 2024-09-24 DIAGNOSIS — Z95.811 HEART REPLACED BY HEART ASSIST DEVICE: ICD-10-CM

## 2024-09-24 LAB
INR PPP: 3 (ref 0.8–1.2)
PROTHROMBIN TIME: 30.4 SEC (ref 9–12.5)

## 2024-09-24 PROCEDURE — 85610 PROTHROMBIN TIME: CPT | Performed by: INTERNAL MEDICINE

## 2024-09-24 PROCEDURE — 93793 ANTICOAG MGMT PT WARFARIN: CPT | Mod: S$GLB,,,

## 2024-09-24 PROCEDURE — 36415 COLL VENOUS BLD VENIPUNCTURE: CPT | Performed by: INTERNAL MEDICINE

## 2024-09-24 NOTE — PROGRESS NOTES
[facility]    09/24/2024 2:33 PM    Assessment/Plan:    Patient presents today with therapeutic INR.    Assessment of patient findings and chart review: no significant findings     Recommendation for patient's warfarin regimen: Continue current maintenance dose    Recommend repeat INR Friday  _________________________________________________________________    Tim Richards (58 y.o.) is followed by the [facility].    Anticoagulation Summary  As of 9/24/2024      INR goal:  2.0-3.0   TTR:  69.3% (6 y)   INR used for dosing:  3.0 (9/24/2024)   Warfarin maintenance plan:  5 mg (5 mg x 1) every day   Weekly warfarin total:  35 mg   Plan last modified:  Pearl Mendez, PharmD (9/5/2024)   Next INR check:  9/27/2024   Target end date:      Indications    LVAD (left ventricular assist device) present (Resolved) [Z95.811]  Anticoagulation monitoring  INR range 2-3 (Resolved) [Z79.01]                 Anticoagulation Episode Summary       INR check location:  Clinic Lab    Preferred lab:      Send INR reminders to:  [facility] COUMADIN LVAD    Comments:  LVAD/ [facility]/Lab [place] Results:[phone] Acct# [id number] Phone: [phone] [phone]/dc1/16/23 [facility] -[phone],fax [phone]/ Bridge:NO(h/o bleeds) see 3/12 encounter for meter process          Anticoagulation Care Providers       Provider Role Specialty Phone number    Antonio Hadley Jr., MD [facility] Cardiology [phone]

## 2024-09-25 DIAGNOSIS — Z00.00 ENCOUNTER FOR MEDICARE ANNUAL WELLNESS EXAM: ICD-10-CM

## 2024-09-27 ENCOUNTER — ANTI-COAG VISIT (OUTPATIENT)
Dept: CARDIOLOGY | Facility: CLINIC | Age: 59
End: 2024-09-27
Payer: MEDICARE

## 2024-09-27 ENCOUNTER — PATIENT MESSAGE (OUTPATIENT)
Dept: TRANSPLANT | Facility: CLINIC | Age: 59
End: 2024-09-27
Payer: MEDICARE

## 2024-09-27 ENCOUNTER — LAB VISIT (OUTPATIENT)
Dept: LAB | Facility: HOSPITAL | Age: 59
End: 2024-09-27
Attending: INTERNAL MEDICINE
Payer: MEDICARE

## 2024-09-27 DIAGNOSIS — Z95.811 HEART REPLACED BY HEART ASSIST DEVICE: ICD-10-CM

## 2024-09-27 LAB
INR PPP: 4 (ref 0.8–1.2)
PROTHROMBIN TIME: 40.6 SEC (ref 9–12.5)

## 2024-09-27 PROCEDURE — 85610 PROTHROMBIN TIME: CPT | Performed by: INTERNAL MEDICINE

## 2024-09-27 PROCEDURE — 36415 COLL VENOUS BLD VENIPUNCTURE: CPT | Performed by: INTERNAL MEDICINE

## 2024-09-27 NOTE — PROGRESS NOTES
"Pt reports having "a few" Bacardi drinks on Wed. He confirmed correct warfarin dose, ate a serving of broccoli as advised and reports he stopped Remeron ~2 weeks ago. He reports "feeling a little feverish" yesterday but denies any fever. He is feeling better today. He denies any bleeding or any other changes    "

## 2024-09-27 NOTE — PROGRESS NOTES
[facility]    2024 2:04 PM    Assessment/Plan:    Patient presents today with supratherapeutic INR.    Assessment of patient findings and chart review: reports alcohol intake     Recommendation for patient's warfarin regimen: Hold dose today then resume current maintenance dose    Recommend repeat INR Monday  _________________________________________________________________    Tim Richards (58 y.o.) is followed by the [facility].    Anticoagulation Summary  As of 2024      INR goal:  2.0-3.0   TTR:  69.2% (6 y)   INR used for dosin.0 (2024)   Warfarin maintenance plan:  5 mg (5 mg x 1) every day   Weekly warfarin total:  35 mg   Plan last modified:  Pearl Mendez, PharmD (2024)   Next INR check:  2024   Target end date:      Indications    LVAD (left ventricular assist device) present (Resolved) [Z95.811]  Anticoagulation monitoring  INR range 2-3 (Resolved) [Z79.01]                 Anticoagulation Episode Summary       INR check location:  Clinic Lab    Preferred lab:      Send INR reminders to:  [facility] COUMADIN LVAD    Comments:  [facility] [place] Results:[phone] Acct# [id number] Phone: [phone] [phone]/dc1/ [facility] [phone],fax [phone]/ Bridge:NO(h/o bleeds) see 3/12 encounter for meter process          Anticoagulation Care Providers       Provider Role Specialty Phone number    Antonio Hadley Jr., MD [facility] Cardiology [phone]

## 2024-09-30 ENCOUNTER — ANTI-COAG VISIT (OUTPATIENT)
Dept: CARDIOLOGY | Facility: CLINIC | Age: 59
End: 2024-09-30
Payer: MEDICARE

## 2024-09-30 ENCOUNTER — LAB VISIT (OUTPATIENT)
Dept: LAB | Facility: HOSPITAL | Age: 59
End: 2024-09-30
Attending: INTERNAL MEDICINE
Payer: MEDICARE

## 2024-09-30 DIAGNOSIS — Z95.811 HEART REPLACED BY HEART ASSIST DEVICE: ICD-10-CM

## 2024-09-30 DIAGNOSIS — Z95.811 HEART REPLACED BY HEART ASSIST DEVICE: Primary | ICD-10-CM

## 2024-09-30 LAB
INR PPP: 2.7 (ref 0.8–1.2)
PROTHROMBIN TIME: 28 SEC (ref 9–12.5)

## 2024-09-30 PROCEDURE — 93793 ANTICOAG MGMT PT WARFARIN: CPT | Mod: S$GLB,,,

## 2024-09-30 PROCEDURE — 36415 COLL VENOUS BLD VENIPUNCTURE: CPT | Performed by: INTERNAL MEDICINE

## 2024-09-30 PROCEDURE — 85610 PROTHROMBIN TIME: CPT | Performed by: INTERNAL MEDICINE

## 2024-09-30 NOTE — PROGRESS NOTES
[facility]    2024 2:34 PM    Assessment/Plan:    Patient presents today with therapeutic INR.    Assessment of patient findings and chart review: no significant findings     Recommendation for patient's warfarin regimen: Continue current maintenance dose    Recommend repeat INR Thursday  _________________________________________________________________    Tim Richards (58 y.o.) is followed by the [facility].    Anticoagulation Summary  As of 2024      INR goal:  2.0-3.0   TTR:  69.1% (6 y)   INR used for dosin.7 (2024)   Warfarin maintenance plan:  5 mg (5 mg x 1) every day   Weekly warfarin total:  35 mg   Plan last modified:  Pearl Mendez, PharmD (2024)   Next INR check:  10/3/2024   Target end date:  --    Indications    LVAD (left ventricular assist device) present (Resolved) [Z95.811]  Anticoagulation monitoring  INR range 2-3 (Resolved) [Z79.01]                 Anticoagulation Episode Summary       INR check location:  Clinic Lab    Preferred lab:  --    Send INR reminders to:  [facility] COUMADIN LVAD    Comments:  LVAD/ [facility] Lab/Lab [place] Results:[phone] Acct# [id number] Phone: [phone] [phone]/dc1/ [facility] -[phone],fax [phone]/ Bridge:NO(h/o bleeds) see 3/12 encounter for meter process          Anticoagulation Care Providers       Provider Role Specialty Phone number    Antonio Hadley Jr., MD Responsible Cardiology [phone]

## 2024-10-03 ENCOUNTER — LAB VISIT (OUTPATIENT)
Dept: LAB | Facility: HOSPITAL | Age: 59
End: 2024-10-03
Attending: INTERNAL MEDICINE
Payer: MEDICARE

## 2024-10-03 ENCOUNTER — PATIENT MESSAGE (OUTPATIENT)
Dept: FAMILY MEDICINE | Facility: CLINIC | Age: 59
End: 2024-10-03
Payer: MEDICARE

## 2024-10-03 ENCOUNTER — ANTI-COAG VISIT (OUTPATIENT)
Dept: CARDIOLOGY | Facility: CLINIC | Age: 59
End: 2024-10-03
Payer: MEDICARE

## 2024-10-03 DIAGNOSIS — Z95.811 HEART REPLACED BY HEART ASSIST DEVICE: ICD-10-CM

## 2024-10-03 LAB
INR PPP: 2.1 (ref 0.8–1.2)
PROTHROMBIN TIME: 22.3 SEC (ref 9–12.5)

## 2024-10-03 PROCEDURE — 36415 COLL VENOUS BLD VENIPUNCTURE: CPT | Performed by: INTERNAL MEDICINE

## 2024-10-03 PROCEDURE — 85610 PROTHROMBIN TIME: CPT | Performed by: INTERNAL MEDICINE

## 2024-10-03 NOTE — PROGRESS NOTES
[facility]    10/03/2024 4:26 PM    Assessment/Plan:    Patient presents today with therapeutic INR.    Assessment of patient findings and chart review: no significant findings     Recommendation for patient's warfarin regimen: Continue current maintenance dose    Recommend repeat INR Monday  _________________________________________________________________    Tim Richards (58 y.o.) is followed by the [facility].    Anticoagulation Summary  As of 10/3/2024      INR goal:  2.0-3.0   TTR:  69.3% (6 y)   INR used for dosin.1 (10/3/2024)   Warfarin maintenance plan:  5 mg (5 mg x 1) every day   Weekly warfarin total:  35 mg   Plan last modified:  Pearl Mendez, PharmD (2024)   Next INR check:  10/7/2024   Target end date:  --    Indications    LVAD (left ventricular assist device) present (Resolved) [Z95.811]  Anticoagulation monitoring  INR range 2-3 (Resolved) [Z79.01]                 Anticoagulation Episode Summary       INR check location:  Clinic Lab    Preferred lab:  --    Send INR reminders to:  [facility] COUMADIN LVAD    Comments:  LVAD/ [facility] - [facility] [place]/Lab [place] Results:[phone] Acct# [id number] Phone: [phone] [phone]/dc1/ [facility] -[phone],fax [phone]/ Bridge:NO(h/o bleeds) see 3/12 encounter for meter process          Anticoagulation Care Providers       Provider Role Specialty Phone number    Antonio Hadley Jr., MD Responsible Cardiology [phone]

## 2024-10-09 ENCOUNTER — LAB VISIT (OUTPATIENT)
Dept: LAB | Facility: HOSPITAL | Age: 59
End: 2024-10-09
Attending: INTERNAL MEDICINE
Payer: MEDICARE

## 2024-10-09 DIAGNOSIS — Z95.811 HEART REPLACED BY HEART ASSIST DEVICE: ICD-10-CM

## 2024-10-09 LAB
INR PPP: 1.8 (ref 0.8–1.2)
PROTHROMBIN TIME: 18.8 SEC (ref 9–12.5)

## 2024-10-09 PROCEDURE — 85610 PROTHROMBIN TIME: CPT | Performed by: INTERNAL MEDICINE

## 2024-10-09 PROCEDURE — 36415 COLL VENOUS BLD VENIPUNCTURE: CPT | Performed by: INTERNAL MEDICINE

## 2024-10-10 ENCOUNTER — ANTI-COAG VISIT (OUTPATIENT)
Dept: CARDIOLOGY | Facility: CLINIC | Age: 59
End: 2024-10-10
Payer: MEDICARE

## 2024-10-10 NOTE — PROGRESS NOTES
[facility]    10/10/2024 8:40 AM    Assessment/Plan:    Patient presents today with subtherapeutic  INR.    Assessment of patient findings and chart review: no significant findings     Recommendation for patient's warfarin regimen: Boost dose today to 7.5mg then resume current maintenance dose    Recommend repeat INR Monday  _________________________________________________________________    Tim Richards (58 y.o.) is followed by the [facility] Management [facility].    Anticoagulation Summary  As of 10/10/2024      INR goal:  2.0-3.0   TTR:  69.3% (5.9 y)   INR used for dosin.8 (10/9/2024)   Warfarin maintenance plan:  5 mg (5 mg x 1) every day   Weekly warfarin total:  35 mg   Plan last modified:  Pearl Mendez, PharmD (2024)   Next INR check:  10/14/2024   Target end date:  --    Indications    LVAD (left ventricular assist device) present (Resolved) [Z95.811]  Anticoagulation monitoring  INR range 2-3 (Resolved) [Z79.01]                 Anticoagulation Episode Summary       INR check location:  Clinic Lab    Preferred lab:  --    Send INR reminders to:  [facility] COUMADIN LVAD    Comments:  LVAD/ [facility] Lab/Lab [place] Results:[phone] [facility]# [id number] Phone: [phone] [phone]/dc1/ [facility] [phone],fax [phone]/ Bridge:NO(h/o bleeds) see 3/12 encounter for meter process          Anticoagulation Care Providers       Provider Role Specialty Phone number    Antonio Hadley Jr., MD Responsible Cardiology [phone]

## 2024-10-14 ENCOUNTER — ANTI-COAG VISIT (OUTPATIENT)
Dept: CARDIOLOGY | Facility: CLINIC | Age: 59
End: 2024-10-14
Payer: MEDICARE

## 2024-10-14 ENCOUNTER — LAB VISIT (OUTPATIENT)
Dept: LAB | Facility: HOSPITAL | Age: 59
End: 2024-10-14
Attending: INTERNAL MEDICINE
Payer: MEDICARE

## 2024-10-14 DIAGNOSIS — Z95.811 HEART REPLACED BY HEART ASSIST DEVICE: ICD-10-CM

## 2024-10-14 LAB
INR PPP: 3.8 (ref 0.8–1.2)
PROTHROMBIN TIME: 38 SEC (ref 9–12.5)

## 2024-10-14 PROCEDURE — 93793 ANTICOAG MGMT PT WARFARIN: CPT | Mod: S$GLB,,,

## 2024-10-14 PROCEDURE — 85610 PROTHROMBIN TIME: CPT | Performed by: INTERNAL MEDICINE

## 2024-10-14 PROCEDURE — 36415 COLL VENOUS BLD VENIPUNCTURE: CPT | Performed by: INTERNAL MEDICINE

## 2024-10-14 NOTE — PROGRESS NOTES
Spoke with patient regarding recent INR results .  Patient questioned and verified correct dosage but taken a extra tablet (15mg) 10/10/24 by accident .  Reported no recent changes .

## 2024-10-14 NOTE — PROGRESS NOTES
[facility]    10/14/2024 3:30 PM    Assessment/Plan:    Patient presents today with supratherapeutic INR.    Assessment of patient findings and chart review: Took extra dose last week     Recommendation for patient's warfarin regimen: Hold dose today then resume current maintenance dose    Recommend repeat INR Wednesday  _________________________________________________________________    Tim Richards (58 y.o.) is followed by the [facility].    Anticoagulation Summary  As of 10/14/2024      INR goal:  2.0-3.0   TTR:  69.3% (6 y)   INR used for dosing:  3.8 (10/14/2024)   Warfarin maintenance plan:  5 mg (5 mg x 1) every day   Weekly warfarin total:  35 mg   Plan last modified:  Pearl Mendez, PharmD (9/5/2024)   Next INR check:  10/16/2024   Target end date:  --    Indications    LVAD (left ventricular assist device) present (Resolved) [Z95.811]  Anticoagulation monitoring  INR range 2-3 (Resolved) [Z79.01]                 Anticoagulation Episode Summary       INR check location:  Clinic Lab    Preferred lab:  --    Send INR reminders to:  [facility] COUMADIN LVAD    Comments:  LVAD/ [facility] Lab/Lab Carmen Results:[phone] Acc# [id number] Phone: [phone] [phone]/dc1/16/23 [facility] -[phone],fax [phone]/ Bridge:NO(h/o bleeds) see 3/12 encounter for meter process          Anticoagulation Care Providers       Provider Role Specialty Phone number    Antonio Hadley Jr., MD [facility] Cardiology [phone]

## 2024-10-16 ENCOUNTER — LAB VISIT (OUTPATIENT)
Dept: LAB | Facility: HOSPITAL | Age: 59
End: 2024-10-16
Attending: INTERNAL MEDICINE
Payer: MEDICARE

## 2024-10-16 ENCOUNTER — ANTI-COAG VISIT (OUTPATIENT)
Dept: CARDIOLOGY | Facility: CLINIC | Age: 59
End: 2024-10-16
Payer: MEDICARE

## 2024-10-16 DIAGNOSIS — Z95.811 HEART REPLACED BY HEART ASSIST DEVICE: ICD-10-CM

## 2024-10-16 LAB
INR PPP: 2.5 (ref 0.8–1.2)
PROTHROMBIN TIME: 26.2 SEC (ref 9–12.5)

## 2024-10-16 PROCEDURE — 36415 COLL VENOUS BLD VENIPUNCTURE: CPT | Performed by: INTERNAL MEDICINE

## 2024-10-16 PROCEDURE — 85610 PROTHROMBIN TIME: CPT | Performed by: INTERNAL MEDICINE

## 2024-10-16 NOTE — PROGRESS NOTES
[facility]    10/16/2024 4:25 PM    Assessment/Plan:    Patient presents today with therapeutic INR.    Assessment of patient findings and chart review: no significant findings     Recommendation for patient's warfarin regimen: Continue current maintenance dose    Recommend repeat INR Tuesday  _________________________________________________________________    Tim Richards (58 y.o.) is followed by the [facility].    Anticoagulation Summary  As of 10/16/2024      INR goal:  2.0-3.0   TTR:  69.3% (6 y)   INR used for dosin.5 (10/16/2024)   Warfarin maintenance plan:  5 mg (5 mg x 1) every day   Weekly warfarin total:  35 mg   Plan last modified:  Pearl Mendez, PharmD (2024)   Next INR check:  10/22/2024   Target end date:  --    Indications    LVAD (left ventricular assist device) present (Resolved) [Z95.811]  Anticoagulation monitoring  INR range 2-3 (Resolved) [Z79.01]                 Anticoagulation Episode Summary       INR check location:  Clinic Lab    Preferred lab:  --    Send INR reminders to:  [facility] COUMADIN LVAD    Comments:  LVAD/ [facility] Lab/Lab [place] Results:[phone] Acct# [id number] Phone: [phone]/dc23 [facility] -[phone],fax [phone]/ Bridge:NO(h/o bleeds) see 3/12 encounter for meter process          Anticoagulation Care Providers       Provider Role Specialty Phone number    Antonio Hadley Jr., MD Responsible Cardiology [phone]

## 2024-10-17 RX ORDER — ALPRAZOLAM 1 MG/1
1 TABLET ORAL 2 TIMES DAILY PRN
Qty: 60 TABLET | Refills: 0 | Status: SHIPPED | OUTPATIENT
Start: 2024-10-17 | End: 2025-04-15

## 2024-10-22 ENCOUNTER — OFFICE VISIT (OUTPATIENT)
Dept: FAMILY MEDICINE | Facility: CLINIC | Age: 59
End: 2024-10-22
Payer: MEDICARE

## 2024-10-22 ENCOUNTER — LAB VISIT (OUTPATIENT)
Dept: LAB | Facility: HOSPITAL | Age: 59
End: 2024-10-22
Attending: INTERNAL MEDICINE
Payer: MEDICARE

## 2024-10-22 VITALS
TEMPERATURE: 98 F | OXYGEN SATURATION: 97 % | BODY MASS INDEX: 30.85 KG/M2 | WEIGHT: 232.81 LBS | HEIGHT: 73 IN | HEART RATE: 40 BPM

## 2024-10-22 DIAGNOSIS — Z95.811 HEART REPLACED BY HEART ASSIST DEVICE: ICD-10-CM

## 2024-10-22 DIAGNOSIS — Z00.00 ANNUAL PHYSICAL EXAM: Primary | ICD-10-CM

## 2024-10-22 DIAGNOSIS — D59.8 OTHER ACQUIRED HEMOLYTIC ANEMIAS: ICD-10-CM

## 2024-10-22 DIAGNOSIS — F19.982 SUBSTANCE OR MEDICATION-INDUCED SLEEP DISORDER, INSOMNIA TYPE: ICD-10-CM

## 2024-10-22 DIAGNOSIS — R79.9 ABNORMAL FINDING OF BLOOD CHEMISTRY: ICD-10-CM

## 2024-10-22 LAB
INR PPP: 3.1 (ref 0.8–1.2)
PROTHROMBIN TIME: 31.4 SEC (ref 9–12.5)

## 2024-10-22 PROCEDURE — 99999 PR PBB SHADOW E&M-EST. PATIENT-LVL III: CPT | Mod: PBBFAC,,, | Performed by: FAMILY MEDICINE

## 2024-10-22 PROCEDURE — 3008F BODY MASS INDEX DOCD: CPT | Mod: CPTII,S$GLB,, | Performed by: FAMILY MEDICINE

## 2024-10-22 PROCEDURE — 36415 COLL VENOUS BLD VENIPUNCTURE: CPT | Performed by: INTERNAL MEDICINE

## 2024-10-22 PROCEDURE — 85610 PROTHROMBIN TIME: CPT | Performed by: INTERNAL MEDICINE

## 2024-10-22 PROCEDURE — 1159F MED LIST DOCD IN RCRD: CPT | Mod: CPTII,S$GLB,, | Performed by: FAMILY MEDICINE

## 2024-10-22 PROCEDURE — 99396 PREV VISIT EST AGE 40-64: CPT | Mod: S$GLB,,, | Performed by: FAMILY MEDICINE

## 2024-10-22 NOTE — ASSESSMENT & PLAN NOTE
Medication is working and helping  Potential medication side effects were discussed with the patient; let me know if any occur.

## 2024-10-22 NOTE — PROGRESS NOTES
Chief Complaint   Patient presents with    Annual Exam       SUBJECTIVE:   Tim Richards is a 58 y.o. male presenting for his annual checkup.   Current Outpatient Medications   Medication Sig Dispense Refill    ALPRAZolam (XANAX) 1 MG tablet Take 1 tablet (1 mg total) by mouth 2 (two) times daily as needed for Anxiety. 60 tablet 0    amLODIPine (NORVASC) 10 MG tablet TAKE 1 TABLET BY MOUTH EVERY DAY 90 tablet 3    cyanocobalamin 1,000 mcg/mL injection INJECT 1 ML (1,000 MCG TOTAL) INTO THE SKIN ONCE A WEEK. FOR 24 DOSES 36 mL 1    ferrous gluconate (FERGON) 324 MG tablet Take 1 tablet (324 mg total) by mouth 2 (two) times daily with meals. 60 tablet 11    levothyroxine (SYNTHROID) 125 MCG tablet Take 1 tablet (125 mcg total) by mouth before breakfast. 30 tablet 11    magnesium oxide (MAG-OX) 400 mg (241.3 mg magnesium) tablet Take 1 tablet (400 mg total) by mouth 2 (two) times daily. 90 tablet 11    mexiletine (MEXITIL) 250 MG Cap Take 1 capsule (250 mg total) by mouth every 8 (eight) hours. 90 capsule 11    mirtazapine (REMERON) 30 MG tablet Take 1 tablet (30 mg total) by mouth every evening. 90 tablet 1    omega-3 acid ethyl esters (LOVAZA) 1 gram capsule Take 2 capsules (2 g total) by mouth 2 (two) times daily. 360 capsule 3    pantoprazole (PROTONIX) 40 MG tablet Take 1 tablet (40 mg total) by mouth daily with lunch. 90 tablet 3    potassium chloride SA (K-DUR,KLOR-CON) 20 MEQ tablet Take 1 tablet with every lasix 40 mg dose 60 tablet 5    torsemide (DEMADEX) 20 MG Tab Take 1 tablets (20mg) on Mondays. 30 tablet 11    warfarin (COUMADIN) 5 MG tablet Take 1-1.5 tablets (5-7.5 mg total) by mouth Daily. As directed by the [facility] 135 tablet 3    zolpidem (AMBIEN CR) 12.5 MG CR tablet TAKE 1 TABLET (12.5 MG TOTAL) BY MOUTH NIGHTLY AS NEEDED FOR INSOMNIA. 30 tablet 5    amiodarone (PACERONE) 200 MG Tab Take 2 tablets (400 mg total) by mouth once daily. 180 tablet 3     No current facility-administered  medications for this visit.     Allergies: Lisinopril, Hydralazine, and Hydralazine analogues   Patient Active Problem List    Diagnosis Date Noted    Other acquired hemolytic anemias 10/11/2023    Decreased functional activity tolerance 05/05/2023    Decreased strength 05/05/2023    Overweight with body mass index (BMI) of 28 to 28.9 in adult 04/13/2023    Poor balance 04/13/2023    Physical debility 04/13/2023    PVD (peripheral vascular disease) 03/02/2023    Postprocedural seroma of a circulatory system organ or structure following other circulatory system procedure 12/03/2022    Other specified hypothyroidism 12/03/2022    Mycotic aneurysm 09/25/2022    Hematoma of groin 09/24/2022    Pseudoaneurysm of right femoral artery 09/24/2022    Left ventricular assist device (LVAD) complication, subsequent encounter 09/22/2022    History of COVID-19 09/22/2022    Impaired mobility 08/30/2022    Stage 3b chronic kidney disease 08/28/2022    Hypertensive cardiovascular-renal disease, stage 1-4 or unspecified chronic kidney disease, with heart failure 08/28/2022    High risk medications (not anticoagulants) long-term use 08/28/2022    Dilated cardiomyopathy 08/28/2022    Anticoagulation monitoring, INR range 2-3 08/28/2022    Adjustment disorder with mixed anxiety and depressed mood 08/13/2022    Atrial flutter 07/30/2022    History of ventricular tachycardia     Post traumatic stress disorder (PTSD)     CHICHI (obstructive sleep apnea) 06/05/2020    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 No dyspnea or chest pain on exertion. No abdominal pain, change in bowel habits, black or bloody stools. No urinary tract or prostatic symptoms. No neurological complaints.    OBJECTIVE:   The patient appears well, alert, oriented x 3, in no distress.   Pulse (!) 40   Temp 97.9 °F (36.6 °C) (Oral)   Ht 6' 1" (1.854 m)   Wt 105.6 kg (232 lb 12.9 oz)   SpO2 97%   BMI 30.71 kg/m²   Wt Readings from Last 5 Encounters:   10/22/24 105.6 kg (232 lb 12.9 oz)   07/25/24 111.1 kg (245 lb)   07/25/24 108.4 kg (239 lb)   07/08/24 103.9 kg (229 lb)   04/18/24 113 kg (249 lb 1.9 oz)       Chronically ill  Has LVAD  Chronic neck changes.    ASSESSMENT:   1. Annual physical exam    2. Other acquired hemolytic anemias    3. Substance or medication-induced sleep disorder, insomnia type    4. Abnormal finding of blood chemistry          PLAN:   Counseled on age appropriate medical preventative services, including age appropriate cancer screenings, over all nutritional health, need for a consistent exercise regimen and an over all push towards maintaining a vigorous and active lifestyle.  Counseled on age appropriate vaccines and discussed upcoming health care needs based on age/gender.  Spent time with patient counseling on need for a good patient/doctor relationship moving forward.  Discussed use of common OTC medications and supplements.  Discussed common dietary aids and use of caffeine and the need for good sleep hygiene and stress management.    Problem List Items Addressed This Visit       RESOLVED: Substance or medication-induced sleep disorder, insomnia type    Current Assessment & Plan     Medication is working and helping  Potential medication side effects were discussed with the patient; let me know if any occur.           Relevant Orders    Hemoglobin A1C    " Microalbumin/Creatinine Ratio, Urine    PTH, Intact    Lipid Panel    CBC Auto Differential    Comprehensive Metabolic Panel    Urinalysis    Other acquired hemolytic anemias    Current Assessment & Plan     Watch the blood count  Monitor cbc         Relevant Orders    Hemoglobin A1C    Microalbumin/Creatinine Ratio, Urine    PTH, Intact    Lipid Panel    CBC Auto Differential    Comprehensive Metabolic Panel    Urinalysis     Other Visit Diagnoses       Annual physical exam    -  Primary    Abnormal finding of blood chemistry        Relevant Orders    Hemoglobin A1C    Microalbumin/Creatinine Ratio, Urine    PTH, Intact    Lipid Panel    CBC Auto Differential    Comprehensive Metabolic Panel    Urinalysis        He will consider a motor scooter with his infirmary to see if he can improve his mobilization in the house and outside   Currently limited to about 100 feet of walking  Just very poor endurance overall

## 2024-10-23 ENCOUNTER — ANTI-COAG VISIT (OUTPATIENT)
Dept: CARDIOLOGY | Facility: CLINIC | Age: 59
End: 2024-10-23
Payer: MEDICARE

## 2024-10-23 NOTE — PROGRESS NOTES
Patient reports correct Warfarin dose, spinach and kale salad 10/20/24, no other changes reported.

## 2024-10-29 ENCOUNTER — LAB VISIT (OUTPATIENT)
Dept: LAB | Facility: HOSPITAL | Age: 59
End: 2024-10-29
Attending: INTERNAL MEDICINE
Payer: MEDICARE

## 2024-10-29 ENCOUNTER — ANTI-COAG VISIT (OUTPATIENT)
Dept: CARDIOLOGY | Facility: CLINIC | Age: 59
End: 2024-10-29
Payer: MEDICARE

## 2024-10-29 DIAGNOSIS — Z95.811 HEART REPLACED BY HEART ASSIST DEVICE: ICD-10-CM

## 2024-10-29 LAB
INR PPP: 1.7 (ref 0.8–1.2)
PROTHROMBIN TIME: 17.9 SEC (ref 9–12.5)

## 2024-10-29 PROCEDURE — 36415 COLL VENOUS BLD VENIPUNCTURE: CPT | Performed by: INTERNAL MEDICINE

## 2024-10-29 PROCEDURE — 93793 ANTICOAG MGMT PT WARFARIN: CPT | Mod: S$GLB,,,

## 2024-10-29 PROCEDURE — 85610 PROTHROMBIN TIME: CPT | Performed by: INTERNAL MEDICINE

## 2024-11-01 ENCOUNTER — ANTI-COAG VISIT (OUTPATIENT)
Dept: CARDIOLOGY | Facility: CLINIC | Age: 59
End: 2024-11-01
Payer: MEDICARE

## 2024-11-01 ENCOUNTER — OFFICE VISIT (OUTPATIENT)
Dept: PSYCHIATRY | Facility: CLINIC | Age: 59
End: 2024-11-01
Payer: MEDICARE

## 2024-11-01 ENCOUNTER — LAB VISIT (OUTPATIENT)
Dept: LAB | Facility: HOSPITAL | Age: 59
End: 2024-11-01
Attending: INTERNAL MEDICINE
Payer: MEDICARE

## 2024-11-01 DIAGNOSIS — Z95.811 LVAD (LEFT VENTRICULAR ASSIST DEVICE) PRESENT: Chronic | ICD-10-CM

## 2024-11-01 DIAGNOSIS — I50.42 CHRONIC COMBINED SYSTOLIC AND DIASTOLIC HEART FAILURE: Chronic | ICD-10-CM

## 2024-11-01 DIAGNOSIS — I47.20 VT (VENTRICULAR TACHYCARDIA): ICD-10-CM

## 2024-11-01 DIAGNOSIS — F41.1 GENERALIZED ANXIETY DISORDER: Primary | ICD-10-CM

## 2024-11-01 DIAGNOSIS — G47.00 INSOMNIA, UNSPECIFIED TYPE: ICD-10-CM

## 2024-11-01 DIAGNOSIS — Z95.811 HEART REPLACED BY HEART ASSIST DEVICE: ICD-10-CM

## 2024-11-01 DIAGNOSIS — Z79.01 ANTICOAGULATION MONITORING, INR RANGE 2-3: ICD-10-CM

## 2024-11-01 LAB
INR PPP: 2.1 (ref 0.8–1.2)
PROTHROMBIN TIME: 22.3 SEC (ref 9–12.5)

## 2024-11-01 PROCEDURE — 85610 PROTHROMBIN TIME: CPT | Performed by: INTERNAL MEDICINE

## 2024-11-01 PROCEDURE — 36415 COLL VENOUS BLD VENIPUNCTURE: CPT | Performed by: INTERNAL MEDICINE

## 2024-11-01 RX ORDER — MIRTAZAPINE 30 MG/1
30 TABLET, FILM COATED ORAL NIGHTLY
Qty: 90 TABLET | Refills: 1 | Status: SHIPPED | OUTPATIENT
Start: 2024-11-01

## 2024-11-01 RX ORDER — ALPRAZOLAM 1 MG/1
1 TABLET ORAL 2 TIMES DAILY PRN
Qty: 60 TABLET | Refills: 5 | Status: SHIPPED | OUTPATIENT
Start: 2024-11-16 | End: 2025-05-15

## 2024-11-04 ENCOUNTER — TELEPHONE (OUTPATIENT)
Dept: TRANSPLANT | Facility: CLINIC | Age: 59
End: 2024-11-04
Payer: MEDICARE

## 2024-11-04 NOTE — TELEPHONE ENCOUNTER
VAD Contact: Left voicemail for Patient regarding equipment maintenance due at upcoming clinic appointment on 11/6/2024.  Requested Patient to bring Mobile Power Unit (MPU), Power Module, and Emergency Bag (2 batteries, 2 clips, 1 backup controller) with them to next appointment for maintenance.    It is medically necessary to ensure patient has properly functioning equipment and wearables to prevent infection, injury or death to patient.

## 2024-11-06 ENCOUNTER — CLINICAL SUPPORT (OUTPATIENT)
Dept: TRANSPLANT | Facility: CLINIC | Age: 59
End: 2024-11-06
Payer: MEDICARE

## 2024-11-06 ENCOUNTER — HOSPITAL ENCOUNTER (OUTPATIENT)
Dept: CARDIOLOGY | Facility: HOSPITAL | Age: 59
Discharge: HOME | End: 2024-11-06
Attending: INTERNAL MEDICINE
Payer: MEDICARE

## 2024-11-06 ENCOUNTER — TELEPHONE (OUTPATIENT)
Dept: CARDIOLOGY | Facility: HOSPITAL | Age: 59
End: 2024-11-06
Payer: MEDICARE

## 2024-11-06 ENCOUNTER — OFFICE VISIT (OUTPATIENT)
Dept: TRANSPLANT | Facility: CLINIC | Age: 59
End: 2024-11-06
Payer: MEDICARE

## 2024-11-06 ENCOUNTER — HOSPITAL ENCOUNTER (OUTPATIENT)
Dept: RADIOLOGY | Facility: HOSPITAL | Age: 59
Discharge: HOME | End: 2024-11-06
Attending: INTERNAL MEDICINE
Payer: MEDICARE

## 2024-11-06 ENCOUNTER — ANTI-COAG VISIT (OUTPATIENT)
Dept: CARDIOLOGY | Facility: CLINIC | Age: 59
End: 2024-11-06
Payer: MEDICARE

## 2024-11-06 VITALS
BODY MASS INDEX: 30.35 KG/M2 | TEMPERATURE: 98 F | HEIGHT: 73 IN | WEIGHT: 229 LBS | HEART RATE: 67 BPM | HEIGHT: 73 IN | BODY MASS INDEX: 30.85 KG/M2 | SYSTOLIC BLOOD PRESSURE: 76 MMHG | WEIGHT: 232.81 LBS

## 2024-11-06 DIAGNOSIS — G47.00 INSOMNIA, UNSPECIFIED TYPE: ICD-10-CM

## 2024-11-06 DIAGNOSIS — G47.33 OSA (OBSTRUCTIVE SLEEP APNEA): Chronic | ICD-10-CM

## 2024-11-06 DIAGNOSIS — Z95.811 HEART REPLACED BY HEART ASSIST DEVICE: ICD-10-CM

## 2024-11-06 DIAGNOSIS — Z95.810 ICD (IMPLANTABLE CARDIOVERTER-DEFIBRILLATOR) IN PLACE: ICD-10-CM

## 2024-11-06 DIAGNOSIS — I13.0 HYPERTENSIVE CARDIOVASCULAR-RENAL DISEASE, STAGE 1-4 OR UNSPECIFIED CHRONIC KIDNEY DISEASE, WITH HEART FAILURE: Primary | ICD-10-CM

## 2024-11-06 DIAGNOSIS — Z95.811 LVAD (LEFT VENTRICULAR ASSIST DEVICE) PRESENT: Primary | Chronic | ICD-10-CM

## 2024-11-06 DIAGNOSIS — I50.42 CHRONIC COMBINED SYSTOLIC AND DIASTOLIC HEART FAILURE: Chronic | ICD-10-CM

## 2024-11-06 DIAGNOSIS — Z95.811 LVAD (LEFT VENTRICULAR ASSIST DEVICE) PRESENT: Chronic | ICD-10-CM

## 2024-11-06 DIAGNOSIS — Z79.01 ANTICOAGULATION MONITORING, INR RANGE 2-3: ICD-10-CM

## 2024-11-06 DIAGNOSIS — I47.20 VT (VENTRICULAR TACHYCARDIA): ICD-10-CM

## 2024-11-06 DIAGNOSIS — I47.20 VT (VENTRICULAR TACHYCARDIA): Primary | ICD-10-CM

## 2024-11-06 DIAGNOSIS — I50.42 CHRONIC COMBINED SYSTOLIC AND DIASTOLIC CONGESTIVE HEART FAILURE: ICD-10-CM

## 2024-11-06 DIAGNOSIS — T82.9XXD LEFT VENTRICULAR ASSIST DEVICE (LVAD) COMPLICATION, SUBSEQUENT ENCOUNTER: ICD-10-CM

## 2024-11-06 LAB
ASCENDING AORTA: 3.18 CM
BSA FOR ECHO PROCEDURE: 2.33 M2
CV ECHO LV RWT: 0.27 CM
DOP CALC LVOT AREA: 3.5 CM2
DOP CALC LVOT DIAMETER: 2.1 CM
E/E' RATIO: 13.75 M/S
ECHO EF ESTIMATED: 11 %
ECHO LV POSTERIOR WALL: 0.9 CM (ref 0.6–1.1)
FRACTIONAL SHORTENING: 7.5 % (ref 28–44)
GRAM STN SPEC: NORMAL
GRAM STN SPEC: NORMAL
INTERVENTRICULAR SEPTUM: 1.2 CM (ref 0.6–1.1)
IVC DIAMETER: 0.94 CM
LA MAJOR: 6.81 CM
LA MINOR: 6.39 CM
LA WIDTH: 4.25 CM
LEFT ATRIUM SIZE: 4.3 CM
LEFT ATRIUM VOLUME INDEX MOD: 46.2 ML/M2
LEFT ATRIUM VOLUME INDEX: 44.5 ML/M2
LEFT ATRIUM VOLUME MOD: 106.3 ML
LEFT ATRIUM VOLUME: 102.42 CM3
LEFT INTERNAL DIMENSION IN SYSTOLE: 6.2 CM (ref 2.1–4)
LEFT VENTRICLE DIASTOLIC VOLUME INDEX: 100.56 ML/M2
LEFT VENTRICLE DIASTOLIC VOLUME: 231.29 ML
LEFT VENTRICLE MASS INDEX: 138 G/M2
LEFT VENTRICLE SYSTOLIC VOLUME INDEX: 89.2 ML/M2
LEFT VENTRICLE SYSTOLIC VOLUME: 205.27 ML
LEFT VENTRICULAR INTERNAL DIMENSION IN DIASTOLE: 6.7 CM (ref 3.5–6)
LEFT VENTRICULAR MASS: 317.3 G
LV LATERAL E/E' RATIO: 11 M/S
LV SEPTAL E/E' RATIO: 18.33 M/S
LVAD BASE RATE: 6300 RPM
MV PEAK E VEL: 0.55 M/S
OHS CV RV/LV RATIO: 0.64 CM
PISA TR MAX VEL: 2.6 M/S
RA MAJOR: 5.52 CM
RA PRESSURE ESTIMATED: 3 MMHG
RA WIDTH: 5.18 CM
RIGHT ATRIAL AREA: 25.8 CM2
RIGHT VENTRICLE DIASTOLIC BASEL DIMENSION: 4.3 CM
RV TB RVSP: 6 MMHG
RV TISSUE DOPPLER FREE WALL SYSTOLIC VELOCITY 1 (APICAL 4 CHAMBER VIEW): 5.45 CM/S
SINUS: 3.74 CM
STJ: 3.68 CM
TDI LATERAL: 0.05 M/S
TDI SEPTAL: 0.03 M/S
TDI: 0.04 M/S
TR MAX PG: 27 MMHG
TRICUSPID ANNULAR PLANE SYSTOLIC EXCURSION: 0.99 CM
TV PEAK GRADIENT: 27 MMHG
TV REST PULMONARY ARTERY PRESSURE: 30 MMHG
Z-SCORE OF LEFT VENTRICULAR DIMENSION IN END DIASTOLE: -2.85
Z-SCORE OF LEFT VENTRICULAR DIMENSION IN END SYSTOLE: 1.12

## 2024-11-06 PROCEDURE — 1159F MED LIST DOCD IN RCRD: CPT | Mod: CPTII,S$GLB,, | Performed by: INTERNAL MEDICINE

## 2024-11-06 PROCEDURE — 87075 CULTR BACTERIA EXCEPT BLOOD: CPT | Performed by: INTERNAL MEDICINE

## 2024-11-06 PROCEDURE — 93750 INTERROGATION VAD IN PERSON: CPT | Mod: S$GLB,,, | Performed by: INTERNAL MEDICINE

## 2024-11-06 PROCEDURE — 93306 TTE W/DOPPLER COMPLETE: CPT

## 2024-11-06 PROCEDURE — 3008F BODY MASS INDEX DOCD: CPT | Mod: CPTII,S$GLB,, | Performed by: INTERNAL MEDICINE

## 2024-11-06 PROCEDURE — 99999 PR PBB SHADOW E&M-EST. PATIENT-LVL III: CPT | Mod: PBBFAC,,, | Performed by: INTERNAL MEDICINE

## 2024-11-06 PROCEDURE — 1160F RVW MEDS BY RX/DR IN RCRD: CPT | Mod: CPTII,S$GLB,, | Performed by: INTERNAL MEDICINE

## 2024-11-06 PROCEDURE — 87205 SMEAR GRAM STAIN: CPT | Performed by: INTERNAL MEDICINE

## 2024-11-06 PROCEDURE — 87070 CULTURE OTHR SPECIMN AEROBIC: CPT | Performed by: INTERNAL MEDICINE

## 2024-11-06 PROCEDURE — 71046 X-RAY EXAM CHEST 2 VIEWS: CPT | Mod: 26,,, | Performed by: RADIOLOGY

## 2024-11-06 PROCEDURE — 99214 OFFICE O/P EST MOD 30 MIN: CPT | Mod: S$GLB,,, | Performed by: INTERNAL MEDICINE

## 2024-11-06 PROCEDURE — 87116 MYCOBACTERIA CULTURE: CPT | Performed by: INTERNAL MEDICINE

## 2024-11-06 PROCEDURE — 71046 X-RAY EXAM CHEST 2 VIEWS: CPT | Mod: TC,FY

## 2024-11-06 PROCEDURE — 87206 SMEAR FLUORESCENT/ACID STAI: CPT | Performed by: INTERNAL MEDICINE

## 2024-11-06 PROCEDURE — 87102 FUNGUS ISOLATION CULTURE: CPT | Performed by: INTERNAL MEDICINE

## 2024-11-06 PROCEDURE — 93306 TTE W/DOPPLER COMPLETE: CPT | Mod: 26,,, | Performed by: INTERNAL MEDICINE

## 2024-11-06 RX ORDER — WARFARIN SODIUM 5 MG/1
5-7.5 TABLET ORAL DAILY
Qty: 135 TABLET | Refills: 3 | Status: SHIPPED | OUTPATIENT
Start: 2024-11-06 | End: 2025-11-06

## 2024-11-06 RX ORDER — POTASSIUM CHLORIDE 20 MEQ/1
TABLET, EXTENDED RELEASE ORAL
Qty: 60 TABLET | Refills: 1 | Status: SHIPPED | OUTPATIENT
Start: 2024-11-06

## 2024-11-06 RX ORDER — AMIODARONE HYDROCHLORIDE 200 MG/1
400 TABLET ORAL DAILY
Qty: 180 TABLET | Refills: 3 | Status: SHIPPED | OUTPATIENT
Start: 2024-11-06 | End: 2025-11-06

## 2024-11-06 RX ORDER — MEXILETINE HYDROCHLORIDE 250 MG/1
250 CAPSULE ORAL EVERY 8 HOURS
Qty: 90 CAPSULE | Refills: 11 | Status: SHIPPED | OUTPATIENT
Start: 2024-11-06

## 2024-11-06 RX ORDER — DOXYCYCLINE 100 MG/1
100 CAPSULE ORAL 2 TIMES DAILY
COMMUNITY
Start: 2024-10-27

## 2024-11-06 RX ORDER — LANOLIN ALCOHOL/MO/W.PET/CERES
400 CREAM (GRAM) TOPICAL DAILY
Qty: 90 TABLET | Refills: 3 | Status: SHIPPED | OUTPATIENT
Start: 2024-11-06

## 2024-11-06 RX ORDER — OMEGA-3-ACID ETHYL ESTERS 1 G/1
2 CAPSULE, LIQUID FILLED ORAL 2 TIMES DAILY
Qty: 360 CAPSULE | Refills: 3 | Status: SHIPPED | OUTPATIENT
Start: 2024-11-06

## 2024-11-06 RX ORDER — PANTOPRAZOLE SODIUM 40 MG/1
40 TABLET, DELAYED RELEASE ORAL
Qty: 90 TABLET | Refills: 3 | Status: SHIPPED | OUTPATIENT
Start: 2024-11-06

## 2024-11-06 RX ORDER — AMLODIPINE BESYLATE 10 MG/1
10 TABLET ORAL DAILY
Qty: 90 TABLET | Refills: 3 | Status: SHIPPED | OUTPATIENT
Start: 2024-11-06

## 2024-11-06 RX ORDER — TORSEMIDE 20 MG/1
TABLET ORAL
Qty: 90 TABLET | Refills: 3 | Status: SHIPPED | OUTPATIENT
Start: 2024-11-06

## 2024-11-06 RX ORDER — AMIODARONE HYDROCHLORIDE 200 MG/1
400 TABLET ORAL
Qty: 180 TABLET | Refills: 3 | OUTPATIENT
Start: 2024-11-06

## 2024-11-06 RX ORDER — WARFARIN SODIUM 5 MG/1
5-7.5 TABLET ORAL DAILY
Qty: 135 TABLET | Refills: 3 | OUTPATIENT
Start: 2024-11-06

## 2024-11-06 RX ORDER — PANTOPRAZOLE SODIUM 40 MG/1
40 TABLET, DELAYED RELEASE ORAL
Qty: 90 TABLET | Refills: 3 | OUTPATIENT
Start: 2024-11-06

## 2024-11-06 NOTE — PROGRESS NOTES
Equipment:  Any Equipment Needs: Routine Maintenance    Emergency Bag  Emergency Equipment With Patient: Yes   Condition of emergency bag: No visible damage  Contents of emergency bag: Backup controller, 2 fully charged batteries, 2 battery clips, reference cards    Maintenance Tracking  Patient has monthly checklist today: No  Patient correctly utilizing monthly checklist: Yes  Educated patient on utilizing monthly checklist correctly and importance of this: Yes    Equipment Inspection  Inspected patient's equipment today: Yes  Equipment clean and free of debris, including locking mechanism: Yes  Cleaned equipment today and educated patient on how to do this: Yes  Manual and visual inspection of driveline: NO   Kinks, rescue tape, tears: No  Clamshell repair: No  Perc lead repair: No    Patient wearing waist strap, vest, diana vest, concealed carry shirt: Concealed carry shirt  Condition of this: NO visible damage      Universal Battery Charger  [facility] [place]: [id number]  [facility] maintenance due:11/2025  [facility] maintenance completed today Yes  [facility] yearly maintenance complete. UBC and power cable inspected for damage and noted to be in good condition. Power on test and  slot test completed and passed. Equipment cleaned.      Backup Controller and Emergency Backup Battery  EBB due to be charged: 5/2025  EBB charged today and self test completed: Yes  Self test passed:  Yes  EBB changed today: No        Equipment Needs  Equipment issued to patient today in clinic: No   Discussed with MCS Coordinator and physician: Yes  Items Under Warranty No VAD Coordinator Notified Yes  Equipment loaned to patient today: No   Waveforms sent: No   It is medically necessary to ensure patient has properly functioning equipment and wearables to prevent infection, injury or death to patient.

## 2024-11-06 NOTE — LETTER
December 4, 2024        Nader Chiu  [address] [place], [place] [address]  [address]  [place] [address]  Phone: [phone]  Fax: [phone]     Nader Chiu  [address] [place] [address]  Phone: [phone]  Fax: [phone]             [id number]  [address]  [place] [address]  Phone: [phone]   Patient: Tim Richards   MR Number: [id number]   YOB: 1965   Date of Visit: 11/6/2024       Dear Dr. Nader Chiu, Nader Chiu    Thank you for referring Tim Richards to me for evaluation. Attached you will find relevant portions of my assessment and plan of care.    If you have questions, please do not hesitate to call me. I look forward to following Tim Richards along with you.    Sincerely,    Amy Rhodes MD    Enclosure    If you would like to receive this communication electronically, please contact [email or web address] or [phone] to request [employer] access.    [employer] is a tool which provides read-only access to select patient information with whom you have a relationship. Its easy to use and provides real time access to review your patients record including encounter summaries, notes, results, and demographic information.    If you feel you have received this communication in error or would no longer like to receive these types of communications, please e-mail [email or web address]

## 2024-11-06 NOTE — PROGRESS NOTES
[facility]    2024 8:37 AM    Assessment/Plan:    Patient presents today with therapeutic INR.    Assessment of patient findings and chart review: no significant findings     Recommendation for patient's warfarin regimen: Continue current maintenance dose    Recommend repeat INR in 1 week  _________________________________________________________________    Tim Richards (58 y.o.) is followed by the [facility].    Anticoagulation Summary  As of 2024      INR goal:  2.0-3.0   TTR:  68.8% (5.9 y)   INR used for dosin.1 (2024)   Warfarin maintenance plan:  5 mg (5 mg x 1) every day   Weekly warfarin total:  35 mg   Plan last modified:  Pearl Mendez, PharmD (10/29/2024)   Next INR check:  2024   Target end date:  --    Indications    LVAD (left ventricular assist device) present (Resolved) [Z95.811]  Anticoagulation monitoring  INR range 2-3 (Resolved) [Z79.01]                 Anticoagulation Episode Summary       INR check location:  Clinic Lab    Preferred lab:  --    Send INR reminders to:  [facility] COUMADIN LVAD    Comments:  LVAD/ [facility] - [facility] Results:[phone] Acct# [id number] Phone: [phone] [phone]/dc1/ [facility] -[phone],fax [phone]/ Bridge:NO(h/o bleeds) see 3/12 encounter for meter process          Anticoagulation Care Providers       Provider Role Specialty Phone number    Antonio Hadley Jr., MD [facility] [phone]

## 2024-11-06 NOTE — PROGRESS NOTES
Subjective:   Patient ID:  Tim Richards is a 58 y.o. male who presents for LVAD followup visit.    Implant Date: Heartmate II on 3/8/2018 (outflow graft changed 3/9/2018), exchanged to Heartmate 3 on 7/13/2022       Heartmate 3 RPM 6300     INR goal: 2-3 (INR meter)  NO Bridge with heparin  Antiplatelets: ASA 81mg           11/6/2024    10:02 AM 7/25/2024     1:33 PM 3/7/2024     2:17 PM 9/8/2023    10:19 AM 8/31/2023    11:48 AM 5/31/2023     2:27 PM 4/13/2023     1:27 PM   TXP SACHI INTERROGATIONS   Type HeartMate3 HeartMate3 HeartMate3  HeartMate3 HeartMate3 HeartMate3   Flow 4.4 5.2 5.6  5.4 5.5 5.4   Speed 6300 6300 6300  6200 5400 6400   PI 2.2 2 1.4  1.9 1.3 2.2   Power (Jackson) 5.3 5.5 5.6  5.3 5.7 5.6   LSL 5900 5900 5900  5800 6000 6000   Pulsatility No Pulse  No Pulse No Pulse No Pulse Pulse No Pulse       HPI  Mr. Richards is a 60 yo black male with stage D HFrEF who was previously supported with a HM2 (implanted 3/8/2018 as DT-VAD) but required pump exchange (HM3 pump exchange 7/13/2022) for thrombosis of pump post COVID-19 PNA and AHRF. His post-op course following pump exchange was prolonged and complicated by worsening cardiogenic shock/RV failure requiring VA-ECMO. He also had recurrent VT as well as atrial flutter with RVR and is on chronic amiodarone and mexiletine. In June/July 2022 he was tapered of steroids (on for amiodarone hyperthyroid) due to concern for avascular necrosis of hip. 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 He completed course of ertapenem and voriconazole.   DLES today is a 2-3 with granulation tissue and serosanguinous drainage. Got silver nitrate today to his site. He is requesting to see an ID provider again in clinic. Did  "get shocked by his ICD, wants to talk to EP as he is concerned about the potential for more ICD shocks.  Patient denies N/V/F/C, lightheadedness, dizziness, PND, orthopnea, LE edema, abdominal pain, abdominal pressure, chest pain, chest pressure, syncope, pre-syncope. Additionally patient has no bleeding, no bright red blood per rectum, melena, no GI symptoms at all.  NYHA FC II.     Review of Systems   Constitutional: Negative. Negative for chills, decreased appetite, diaphoresis, fever, malaise/fatigue, night sweats, weight gain and weight loss.   Eyes: Negative.    Cardiovascular:  Negative for chest pain, claudication, cyanosis, dyspnea on exertion, irregular heartbeat, leg swelling, near-syncope, orthopnea, palpitations, paroxysmal nocturnal dyspnea and syncope.   Respiratory:  Negative for cough, hemoptysis and shortness of breath.    Endocrine: Negative.    Hematologic/Lymphatic: Negative.    Skin:  Negative for color change, dry skin and nail changes.   Musculoskeletal: Negative.    Gastrointestinal: Negative.    Genitourinary: Negative.    Neurological:  Negative for weakness.       Objective:   Blood pressure (!) 76/0, temperature 97.7 °F (36.5 °C), temperature source Oral, height 6' 1" (1.854 m), weight 103.9 kg (229 lb).body mass index is 30.21 kg/m².    Physical Exam  Vitals reviewed.   Constitutional:       Appearance: He is well-developed.      Comments:      HENT:      Head: Normocephalic.   Neck:      Vascular: No carotid bruit or JVD.   Cardiovascular:      Heart sounds: No murmur heard.     Comments: Smooth VAD hum., DLES is 2-3 with granulation tissue and mod drainage  Pulmonary:      Effort: Pulmonary effort is normal.      Breath sounds: Normal breath sounds.   Abdominal:      General: Bowel sounds are normal.      Palpations: Abdomen is soft.   Skin:     General: Skin is warm.   Neurological:      Mental Status: He is alert.         Lab Results   Component Value Date    WBC 3.84 (L) 11/06/2024 "    HGB 15.2 11/06/2024    HCT 48.0 11/06/2024    MCV 94 11/06/2024     11/06/2024    CO2 22 (L) 11/06/2024    CREATININE 1.9 (H) 11/06/2024    CALCIUM 9.5 11/06/2024    ALBUMIN 3.7 11/06/2024    AST 67 (H) 11/06/2024     (H) 11/06/2024    ALT 65 (H) 11/06/2024     11/06/2024       Lab Results   Component Value Date    INR 2.1 (H) 11/06/2024    INR 2.1 (H) 11/01/2024    INR 1.7 (H) 10/29/2024       BNP   Date Value Ref Range Status   11/06/2024 326 (H) 0 - 99 pg/mL Final     Comment:     Values of less than 100 pg/ml are consistent with non-CHF populations.   07/25/2024 132 (H) 0 - 99 pg/mL Final     Comment:     Values of less than 100 pg/ml are consistent with non-CHF populations.   04/12/2024 398 (H) 0 - 99 pg/mL Final     Comment:     Values of less than 100 pg/ml are consistent with non-CHF populations.       LD   Date Value Ref Range Status   11/06/2024 248 110 - 260 U/L Final     Comment:     Results are increased in hemolyzed samples.   07/25/2024 269 (H) 110 - 260 U/L Final     Comment:     Results are increased in hemolyzed samples.   03/07/2024 225 110 - 260 U/L Final     Comment:     Results are increased in hemolyzed samples.         Assessment:      1. LVAD (left ventricular assist device) present    2. Chronic combined systolic and diastolic heart failure    3. Anticoagulation monitoring, INR range 2-3    4. Chronic combined systolic and diastolic congestive heart failure    5. Insomnia, unspecified type    6. VT (ventricular tachycardia)        Plan:   Patient is now NYHA class II. BP is controlled. Appears euvolemic on exam. INR is therapeutic.   VAD interrogation was performed in clinic  Echo reviewed, no changes to speed or volume managemetn.   He would like to see EP again as well in regards to his ICD shocks. Although not on BB right now, consider adding if EP does not have other options for tx re: this. Messaged EP regarding this, as Jama was his EP and he has left. Will  see if he can get his device interrogated asap.   I have reviewed his GDMT, he is not on a ACEI/ARB/ARNI due to allergy/anaphylaxis or MRA due worsening creatinine. He is not on a beta blocker due to hypotension and RV dysfunction.   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   Recommend 2 gram sodium restriction and 1500cc fluid restriction.  Encourage physical activity with graded exercise program.  Requested patient to weigh themselves daily, and to notify us if their weight increases by more than 3 lbs in 1 day or 5 lbs in 1 week.   Additionally, the patient has a patient centered goal of being able to get better and do more with family and home  RTC in 4 months       Patient advised that it is recommended that all patients, and their close contacts and household members receive Covid vaccination.     Listed for transplant: No. Implanted as DT.     UNOS Patient Status  Functional Status: 50% - Requires considerable assistance and frequent medical care  Physical Capacity: No Limitations  Working for Income: Unknown

## 2024-11-06 NOTE — TELEPHONE ENCOUNTER
"Called pt on this afternoon in relation to message received from Dr. Rhodes.      I spoke to Mr. Richards this afternoon. Pt has a Sub Q ICD.  His first Therapy zone is programmed @ 200 bpm.  After speaking with the pt he stated, " I really just wanted to have my device checked".  Pt does not do remote monitoring as per pt his prior insurance did not cover it.  Scheduled pt an in clinic ICD check on 11/12/24.  Reviewed recommended ICD follow up of in clinic IC checks every 3 months.   "

## 2024-11-07 LAB
ACID FAST MOD KINY STN SPEC: NORMAL
MYCOBACTERIUM SPEC QL CULT: NORMAL

## 2024-11-08 ENCOUNTER — PATIENT MESSAGE (OUTPATIENT)
Dept: TRANSPLANT | Facility: CLINIC | Age: 59
End: 2024-11-08
Payer: MEDICARE

## 2024-11-08 LAB
BACTERIA SPEC AEROBE CULT: NORMAL
BACTERIA SPEC ANAEROBE CULT: NORMAL

## 2024-11-11 ENCOUNTER — PATIENT MESSAGE (OUTPATIENT)
Dept: INFECTIOUS DISEASES | Facility: CLINIC | Age: 59
End: 2024-11-11
Payer: MEDICARE

## 2024-11-11 ENCOUNTER — TELEPHONE (OUTPATIENT)
Dept: INFECTIOUS DISEASES | Facility: CLINIC | Age: 59
End: 2024-11-11
Payer: MEDICARE

## 2024-11-11 ENCOUNTER — PATIENT MESSAGE (OUTPATIENT)
Dept: TRANSPLANT | Facility: CLINIC | Age: 59
End: 2024-11-11
Payer: MEDICARE

## 2024-11-11 DIAGNOSIS — T82.7XXA INFECTION ASSOCIATED WITH DRIVELINE OF VENTRICULAR ASSIST DEVICE: Primary | ICD-10-CM

## 2024-11-11 NOTE — TELEPHONE ENCOUNTER
----- Message from Med Assistant Rockwell sent at 11/6/2024 11:31 AM CST -----  Hey,    LVAD reached out. Pt would like to see you instead of Dr. Perez. Anyway you can see him soon?

## 2024-11-11 NOTE — TELEPHONE ENCOUNTER
----- Message from Roxie Garg MD sent at 11/11/2024 10:18 AM CST -----  Luli BENDER sent in an e-visit for Friday. Can you let him know VAD team reached out to us and that he should get a message sometime from today to Friday for an e-visit?  Thanks  KBR  ----- Message -----  From: Luli Rodriguez MA  Sent: 11/6/2024  11:33 AM CST  To: MD Fredrick Nicholas,    LVAD reached out. Pt would like to see you instead of Dr. Perez. Anyway you can see him soon?

## 2024-11-12 ENCOUNTER — ANTI-COAG VISIT (OUTPATIENT)
Dept: CARDIOLOGY | Facility: CLINIC | Age: 59
End: 2024-11-12
Payer: MEDICARE

## 2024-11-12 ENCOUNTER — CLINICAL SUPPORT (OUTPATIENT)
Dept: CARDIOLOGY | Facility: HOSPITAL | Age: 59
End: 2024-11-12
Attending: INTERNAL MEDICINE
Payer: MEDICARE

## 2024-11-12 DIAGNOSIS — I47.20 VT (VENTRICULAR TACHYCARDIA): ICD-10-CM

## 2024-11-12 DIAGNOSIS — Z95.810 ICD (IMPLANTABLE CARDIOVERTER-DEFIBRILLATOR) IN PLACE: ICD-10-CM

## 2024-11-12 LAB — INR PPP: 6.6

## 2024-11-12 PROCEDURE — 93282 PRGRMG EVAL IMPLANTABLE DFB: CPT

## 2024-11-12 PROCEDURE — 93793 ANTICOAG MGMT PT WARFARIN: CPT | Mod: S$GLB,,,

## 2024-11-12 NOTE — ASSESSMENT & PLAN NOTE
Procedure: Device Interrogation Including analysis of device parameters  Current Settings: Ventricular Assist Device  Review of device function is stable    TXP LVAD INTERROGATIONS 7/13/2022 7/13/2022 7/13/2022 7/13/2022 7/13/2022 7/13/2022 7/13/2022   Type HeartMate II HeartMate II HeartMate II HeartMate II HeartMate II HeartMate II HeartMate II   Flow 7.2 7.2 7.1 7.2 7.1 7.4 7.0   Speed 9800 9800 9800 9800 9800 9800 9800   PI 2.8 2.7 2.7 2.6 2.6 2.5 2.7   Power (Jackson) 7.2 7.2 7.1 7.3 7.3 7.5 7.3   LSL 9400 9400 9400 9400 9400 9400 9400   Pulsatility Intermittent pulse Intermittent pulse Intermittent pulse Intermittent pulse Intermittent pulse Intermittent pulse Intermittent pulse      Lab: -[id number]

## 2024-11-12 NOTE — PROGRESS NOTES
Pt confirmed correct dosing per calendar. He confirmed he has been taking doxycycline (VIBRAMYCIN) 100 MG Cap, he was given a 30 day supply, which he started (about 2.5-3 weeks ago) and stopped. He now has 2 weeks left.  No s/sx of bleeding. He has been feeling a little tired lately. No other issues.

## 2024-11-12 NOTE — PROGRESS NOTES
Pt advised to hold coumadin dose tonight (11/12/24) and recheck INR tomorrow. Lab scheduled. Pt verbalized understanding.

## 2024-11-12 NOTE — PROGRESS NOTES
11/12- Mary Ann from [facility] reported a verbal of 6.6 from 11/12.

## 2024-11-13 ENCOUNTER — LAB VISIT (OUTPATIENT)
Dept: LAB | Facility: HOSPITAL | Age: 59
End: 2024-11-13
Attending: INTERNAL MEDICINE
Payer: MEDICARE

## 2024-11-13 ENCOUNTER — ANTI-COAG VISIT (OUTPATIENT)
Dept: CARDIOLOGY | Facility: CLINIC | Age: 59
End: 2024-11-13
Payer: MEDICARE

## 2024-11-13 DIAGNOSIS — Z95.811 HEART REPLACED BY HEART ASSIST DEVICE: ICD-10-CM

## 2024-11-13 LAB
INR PPP: 5.5 (ref 0.8–1.2)
PROTHROMBIN TIME: 54.2 SEC (ref 9–12.5)

## 2024-11-13 PROCEDURE — 85610 PROTHROMBIN TIME: CPT | Performed by: INTERNAL MEDICINE

## 2024-11-13 PROCEDURE — 93793 ANTICOAG MGMT PT WARFARIN: CPT | Mod: S$GLB,,,

## 2024-11-13 PROCEDURE — 36415 COLL VENOUS BLD VENIPUNCTURE: CPT | Performed by: INTERNAL MEDICINE

## 2024-11-13 NOTE — PROGRESS NOTES
pt advised to hold coumadin on 11/13, recheck INR tomorrow, lab scheduled. pt verbalized understanding.

## 2024-11-13 NOTE — PROGRESS NOTES
Tala with [facility] called in a verbal result as: INR 5.5 / PT -54.2 dated today 11/13/24

## 2024-11-13 NOTE — PROGRESS NOTES
Pt confirmed holding dose on 11/12. He also confirmed he checked his pill box and realized he has been taking 2 tablets (10mg) daily since last INR. No new issues confirmed.

## 2024-11-13 NOTE — PROGRESS NOTES
[facility]    2024 3:50 PM    Assessment/Plan:    Patient presents today with supratherapeutic INR.    Assessment of patient findings and chart review: Patient now realizes he was taking double doses prior to yesterday's INR    Recommendation for patient's warfarin regimen: Hold dose today    Recommend repeat INR tomorrow  _________________________________________________________________    Tim Richards (58 y.o.) is followed by the [employer] [facility].    Anticoagulation Summary  As of 2024      INR goal:  2.0-3.0   TTR:  68.7% (5.9 y)   INR used for dosin.5 (2024)   Warfarin maintenance plan:  5 mg (5 mg x 1) every day   Weekly warfarin total:  35 mg   Plan last modified:  Pearl Mendez, PharmD (10/29/2024)   Next INR check:  2024   Target end date:  --    Indications    LVAD (left ventricular assist device) present (Resolved) [Z95.811]  Anticoagulation monitoring  INR range 2-3 (Resolved) [Z79.01]                 Anticoagulation Episode Summary       INR check location:  Clinic Lab    Preferred lab:  --    Send INR reminders to:  [facility] COUMADIN LVAD    Comments:  LVAD/ [facility] Lab/Lab [place] Results:[phone] [facility]# [id number] Phone: [phone]/dc1/ [facility] [phone],fax [phone]/ Bridge:NO(h/o bleeds) see 3/12 encounter for meter process          Anticoagulation Care Providers       Provider Role Specialty Phone number    Antonio Hadley Jr., MD Responsible Cardiology [phone]

## 2024-11-14 ENCOUNTER — LAB VISIT (OUTPATIENT)
Dept: LAB | Facility: HOSPITAL | Age: 59
End: 2024-11-14
Attending: INTERNAL MEDICINE
Payer: MEDICARE

## 2024-11-14 ENCOUNTER — ANTI-COAG VISIT (OUTPATIENT)
Dept: CARDIOLOGY | Facility: CLINIC | Age: 59
End: 2024-11-14
Payer: MEDICARE

## 2024-11-14 DIAGNOSIS — Z95.811 HEART REPLACED BY HEART ASSIST DEVICE: ICD-10-CM

## 2024-11-14 LAB
INR PPP: 3.7 (ref 0.8–1.2)
PROTHROMBIN TIME: 37.1 SEC (ref 9–12.5)

## 2024-11-14 PROCEDURE — 85610 PROTHROMBIN TIME: CPT | Performed by: INTERNAL MEDICINE

## 2024-11-14 PROCEDURE — 36415 COLL VENOUS BLD VENIPUNCTURE: CPT | Performed by: INTERNAL MEDICINE

## 2024-11-14 NOTE — PROGRESS NOTES
[facility]    11/14/2024    Tim Richards (58 y.o.) is followed by the [employer] [facility]ticoagulation Management [facility].      Assessment/Plan:    Tim Richards presents with a supratherapeutic INR. Goal INR: 2.0-3.0    Lab Results   Component Value Date    INR 3.7 (H) 11/14/2024    INR 5.5 (HH) 11/13/2024    INR 6.6 (HH) 11/12/2024       Assessment of patient findings per MA/LPN and chart review:   The following significant findings were found:   None    Recommendation for patient's warfarin regimen:   Due to supratherapeutic INR, patient was instructed to SKIP their next 1 warfarin doses.    Recommended repeat INR in 4 days      Radha Jabier, PharmD, BCPS  Clinical Pharmacist - [employer] [facility]ticoagulation Management Program  Preferred Contact: Secure Messaging or In Basket Message

## 2024-11-15 NOTE — PROCEDURES
"Tim Richards is a 56 y.o. male patient.    Temp: 97.8 °F (36.6 °C) (09/29/22 0753)  Pulse: 82 (09/29/22 0814)  Resp: 18 (09/29/22 0753)  BP: (!) 76/0 (09/29/22 0753)  SpO2: (!) 94 % (09/29/22 0814)  Weight: 82.2 kg (181 lb 3.5 oz) (09/29/22 0529)  Height: 6' 1" (185.4 cm) (09/24/22 2136)    PICC  Date/Time: 9/29/2022 10:58 AM  Performed by: Leanna Fowler RN  Assisting provider: Phan Dillon RN  Consent Done: Yes  Time out: Immediately prior to procedure a time out was called to verify the correct patient, procedure, equipment, support staff and site/side marked as required  Indications: med administration and vascular access  Anesthesia: local infiltration  Local anesthetic: lidocaine 1% without epinephrine  Anesthetic Total (mL): 3  Description of findings: PICC  Preparation: skin prepped with ChloraPrep  Skin prep agent dried: skin prep agent completely dried prior to procedure  Sterile barriers: all five maximum sterile barriers used - cap, mask, sterile gown, sterile gloves, and large sterile sheet  Hand hygiene: hand hygiene performed prior to central venous catheter insertion  Location details: right brachial  Catheter type: double lumen  Catheter size: 5 Fr  Catheter Length: 42cm    Ultrasound guidance: yes  Vessel Caliber: medium and patent, compressibility normal  Vascular Doppler: not done  Needle advanced into vessel with real time Ultrasound guidance.  Guidewire confirmed in vessel.  Image recorded and saved.  Sterile sheath used.  Number of attempts: 1  Post-procedure: blood return through all ports, chlorhexidine patch and sterile dressing applied  Technical procedures used: 3CG  Specimens: No  Implants: No  Assessment: placement verified by x-ray  Complications: none        Name   9/29/2022    "
[employer] SubQ AICD reprogrammed to [employer].  
DC instructions

## 2024-11-19 ENCOUNTER — ANTI-COAG VISIT (OUTPATIENT)
Dept: CARDIOLOGY | Facility: CLINIC | Age: 59
End: 2024-11-19
Payer: MEDICARE

## 2024-11-19 ENCOUNTER — LAB VISIT (OUTPATIENT)
Dept: LAB | Facility: HOSPITAL | Age: 59
End: 2024-11-19
Attending: INTERNAL MEDICINE
Payer: MEDICARE

## 2024-11-19 DIAGNOSIS — Z95.811 HEART REPLACED BY HEART ASSIST DEVICE: ICD-10-CM

## 2024-11-19 LAB
INR PPP: 2.2 (ref 0.8–1.2)
PROTHROMBIN TIME: 22.6 SEC (ref 9–12.5)

## 2024-11-19 PROCEDURE — 93793 ANTICOAG MGMT PT WARFARIN: CPT | Mod: S$GLB,,,

## 2024-11-19 PROCEDURE — 85610 PROTHROMBIN TIME: CPT | Performed by: INTERNAL MEDICINE

## 2024-11-19 PROCEDURE — 36415 COLL VENOUS BLD VENIPUNCTURE: CPT | Performed by: INTERNAL MEDICINE

## 2024-11-19 NOTE — PROGRESS NOTES
[facility]    2024 2:50 PM    Assessment/Plan:    Patient presents today with therapeutic INR.    Assessment of patient findings and chart review: no significant findings     Recommendation for patient's warfarin regimen: Continue current maintenance dose    Recommend repeat INR Friday  _________________________________________________________________    Tim Richards (58 y.o.) is followed by the [employer] [facility]ticoagulation Management Program.    Anticoagulation Summary  As of 2024      INR goal:  2.0-3.0   TTR:  68.6% (5.9 y)   INR used for dosin.2 (2024)   Warfarin maintenance plan:  5 mg (5 mg x 1) every day   Weekly warfarin total:  35 mg   Plan last modified:  Pearl Mendez, PharmD (10/29/2024)   Next INR check:  2024   Target end date:  --    Indications    LVAD (left ventricular assist device) present (Resolved) [Z95.811]  Anticoagulation monitoring  INR range 2-3 (Resolved) [Z79.01]                 Anticoagulation Episode Summary       INR check location:  Clinic Lab    Preferred lab:  --    Send INR reminders to:  [facility] COUMADIN LVAD    Comments:  LVAD/ MH - [facility] Lab/Lab [place] Results:[phone] Acct# [id number] Phone: [phone] [phone]/dc1/ [facility] [phone],fax [phone]/ Bridge:NO(h/o bleeds) see 3/12 encounter for meter process          Anticoagulation Care Providers       Provider Role Specialty Phone number    Antonio Hadley Jr., MD [facility] [phone]

## 2024-11-22 ENCOUNTER — LAB VISIT (OUTPATIENT)
Dept: LAB | Facility: HOSPITAL | Age: 59
End: 2024-11-22
Attending: INTERNAL MEDICINE
Payer: MEDICARE

## 2024-11-22 ENCOUNTER — ANTI-COAG VISIT (OUTPATIENT)
Dept: CARDIOLOGY | Facility: CLINIC | Age: 59
End: 2024-11-22
Payer: MEDICARE

## 2024-11-22 DIAGNOSIS — Z95.811 HEART REPLACED BY HEART ASSIST DEVICE: ICD-10-CM

## 2024-11-22 LAB
INR PPP: 2.2 (ref 0.8–1.2)
PROTHROMBIN TIME: 23.2 SEC (ref 9–12.5)

## 2024-11-22 PROCEDURE — 85610 PROTHROMBIN TIME: CPT | Performed by: INTERNAL MEDICINE

## 2024-11-22 PROCEDURE — 36415 COLL VENOUS BLD VENIPUNCTURE: CPT | Performed by: INTERNAL MEDICINE

## 2024-11-22 NOTE — PROGRESS NOTES
11/22/2024-Spoke to pt regarding today's missed INR. Pt stated he will go to lab in 30 minutes, INR appointment changed to 1:45pm today, pt verbalized understanding.

## 2024-11-22 NOTE — PROGRESS NOTES
[facility]    2024 3:16 PM    Assessment/Plan:    Patient presents today with therapeutic INR.    Assessment of patient findings and chart review: no significant findings     Recommendation for patient's warfarin regimen: Continue current maintenance dose    Recommend repeat INR Wednesday  _________________________________________________________________    Tim Richards (58 y.o.) is followed by the [employer] [facility]ticoagulation [facility].    Anticoagulation Summary  As of 2024      INR goal:  2.0-3.0   TTR:  68.3% (5.9 y)   INR used for dosin.2 (2024)   Warfarin maintenance plan:  5 mg (5 mg x 1) every day   Weekly warfarin total:  35 mg   Plan last modified:  Pearl Mendez, PharmD (10/29/2024)   Next INR check:  2024   Target end date:  --    Indications    LVAD (left ventricular assist device) present (Resolved) [Z95.811]  Anticoagulation monitoring  INR range 2-3 (Resolved) [Z79.01]                 Anticoagulation Episode Summary       INR check location:  Clinic Lab    Preferred lab:  --    Send INR reminders to:  [facility] COUMADIN LVAD    Comments:  LVAD/ MH - [facility] Lab/Lab [place] Results:[phone] Acct# [id number] Phone: [phone]/dc1/ [facility] -[phone],fax [phone]/ Bridge:NO(h/o bleeds) see 3/12 encounter for meter process          Anticoagulation Care Providers       Provider Role Specialty Phone number    Antonio Hadley Jr., MD [facility] Cardiology [phone]

## 2024-11-27 ENCOUNTER — LAB VISIT (OUTPATIENT)
Dept: LAB | Facility: HOSPITAL | Age: 59
End: 2024-11-27
Attending: INTERNAL MEDICINE
Payer: MEDICARE

## 2024-11-27 ENCOUNTER — ANTI-COAG VISIT (OUTPATIENT)
Dept: CARDIOLOGY | Facility: CLINIC | Age: 59
End: 2024-11-27
Payer: MEDICARE

## 2024-11-27 DIAGNOSIS — Z95.811 HEART REPLACED BY HEART ASSIST DEVICE: ICD-10-CM

## 2024-11-27 LAB
INR PPP: 2.5 (ref 0.8–1.2)
PROTHROMBIN TIME: 25.4 SEC (ref 9–12.5)

## 2024-11-27 PROCEDURE — 36415 COLL VENOUS BLD VENIPUNCTURE: CPT | Performed by: INTERNAL MEDICINE

## 2024-11-27 PROCEDURE — 85610 PROTHROMBIN TIME: CPT | Performed by: INTERNAL MEDICINE

## 2024-11-27 NOTE — PROGRESS NOTES
[facility]    2024 2:39 PM    Assessment/Plan:    Patient presents today with therapeutic INR.    Assessment of patient findings and chart review: no significant findings     Recommendation for patient's warfarin regimen: Continue current maintenance dose    Recommend repeat INR in 1 week  _________________________________________________________________    Tim Richards (58 y.o.) is followed by the [employer] Anticoagulation Management Program.    Anticoagulation Summary  As of 2024      INR goal:  2.0-3.0   TTR:  68.3% (5.9 y)   INR used for dosin.5 (2024)   Warfarin maintenance plan:  5 mg (5 mg x 1) every day   Weekly warfarin total:  35 mg   Plan last modified:  Pearl Mendez, PharmD (10/29/2024)   Next INR check:  2024   Target end date:  --    Indications    LVAD (left ventricular assist device) present (Resolved) [Z95.811]  Anticoagulation monitoring  INR range 2-3 (Resolved) [Z79.01]                 Anticoagulation Episode Summary       INR check location:  Clinic Lab    Preferred lab:  --    Send INR reminders to:  [facility] COUMADIN LVAD    Comments:  LVAD/ MH - [facility] Lab/Lab [place] Results:[phone] Acct# [id number] Phone: [phone]/dc1/ [facility] [phone],fax [phone]/ Bridge:NO(h/o bleeds) see 3/12 encounter for meter process          Anticoagulation Care Providers       Provider Role Specialty Phone number    Antonio Hadley Jr., MD [facility] Cardiology [phone]

## 2024-12-04 ENCOUNTER — LAB VISIT (OUTPATIENT)
Dept: LAB | Facility: HOSPITAL | Age: 59
End: 2024-12-04
Attending: INTERNAL MEDICINE
Payer: MEDICARE

## 2024-12-04 ENCOUNTER — ANTI-COAG VISIT (OUTPATIENT)
Dept: CARDIOLOGY | Facility: CLINIC | Age: 59
End: 2024-12-04
Payer: MEDICARE

## 2024-12-04 DIAGNOSIS — Z95.811 HEART REPLACED BY HEART ASSIST DEVICE: ICD-10-CM

## 2024-12-04 LAB
INR PPP: 2.8 (ref 0.8–1.2)
PROTHROMBIN TIME: 28.3 SEC (ref 9–12.5)

## 2024-12-04 PROCEDURE — 36415 COLL VENOUS BLD VENIPUNCTURE: CPT | Performed by: INTERNAL MEDICINE

## 2024-12-04 PROCEDURE — 85610 PROTHROMBIN TIME: CPT | Performed by: INTERNAL MEDICINE

## 2024-12-04 NOTE — PROCEDURES
11/6/2024    10:02 AM 7/25/2024     1:33 PM 3/7/2024     2:17 PM 9/8/2023    10:19 AM 8/31/2023    11:48 AM 5/31/2023     2:27 PM 4/13/2023     1:27 PM   TXP SACHI INTERROGATIONS   Type HeartMate3 HeartMate3 HeartMate3  HeartMate3 HeartMate3 HeartMate3   Flow 4.4 5.2 5.6  5.4 5.5 5.4   Speed 6300 6300 6300  6200 5400 6400   PI 2.2 2 1.4  1.9 1.3 2.2   Power ([place]) 5.3 5.5 5.6  5.3 5.7 5.6   LSL 5900 5900 5900  5800 6000 6000   Pulsatility No Pulse  No Pulse No Pulse No Pulse Pulse No Pulse   }

## 2024-12-04 NOTE — PROGRESS NOTES
[email or web address]    2024 3:00 PM    Assessment/Plan:    Patient presents today with therapeutic INR.    Assessment of patient findings and chart review: no significant findings     Recommendation for patient's warfarin regimen: Continue current maintenance dose    Recommend repeat INR in 1 week  _________________________________________________________________    Tim Richards (59 y.o.) is followed by the [employer] Anticoagulation Management Program.    Anticoagulation Summary  As of 2024      INR goal:  2.0-3.0   TTR:  68.3% (5.9 y)   INR used for dosin.8 (2024)   Warfarin maintenance plan:  5 mg (5 mg x 1) every day   Weekly warfarin total:  35 mg   Plan last modified:  Pearl Mendez, PharmD (10/29/2024)   Next INR check:  2024   Target end date:  --    Indications    LVAD (left ventricular assist device) present (Resolved) [Z95.811]  Anticoagulation monitoring  INR range 2-3 (Resolved) [Z79.01]                 Anticoagulation Episode Summary       INR check location:  Clinic Lab    Preferred lab:  --    Send INR reminders to:  [facility] COUMADIN LVAD    Comments:  LVAD/ [facility] - [facility]Lab [place] Results:[phone] Acct# [id number] Phone: [phone]/dc1/ [facility] -[phone],fax [phone]/ Bridge:NO(h/o bleeds) see 3/12 encounter for meter process          Anticoagulation Care Providers       Provider Role Specialty Phone number    Antonio Hadley Jr., MD [facility] Cardiology [phone]

## 2024-12-13 ENCOUNTER — LAB VISIT (OUTPATIENT)
Dept: LAB | Facility: HOSPITAL | Age: 59
End: 2024-12-13
Attending: INTERNAL MEDICINE
Payer: MEDICARE

## 2024-12-13 ENCOUNTER — ANTI-COAG VISIT (OUTPATIENT)
Dept: CARDIOLOGY | Facility: CLINIC | Age: 59
End: 2024-12-13
Payer: MEDICARE

## 2024-12-13 DIAGNOSIS — Z95.811 HEART REPLACED BY HEART ASSIST DEVICE: ICD-10-CM

## 2024-12-13 LAB
INR PPP: 2.8 (ref 0.8–1.2)
PROTHROMBIN TIME: 29 SEC (ref 9–12.5)

## 2024-12-13 PROCEDURE — 36415 COLL VENOUS BLD VENIPUNCTURE: CPT | Performed by: INTERNAL MEDICINE

## 2024-12-13 PROCEDURE — 85610 PROTHROMBIN TIME: CPT | Performed by: INTERNAL MEDICINE

## 2024-12-13 NOTE — PROGRESS NOTES
[email or web address]    2024 3:47 PM    Assessment/Plan:    Patient presents today with therapeutic INR.    Assessment of patient findings and chart review: no significant findings     Recommendation for patient's warfarin regimen: Continue current maintenance dose    Recommend repeat INR in 1 week  _________________________________________________________________    Tim Richards (59 y.o.) is followed by the [employer] [facility]ticoagulation Management Program.    Anticoagulation Summary  As of 2024      INR goal:  2.0-3.0   TTR:  68.7% (5.8 y)   INR used for dosin.8 (2024)   Warfarin maintenance plan:  5 mg (5 mg x 1) every day   Weekly warfarin total:  35 mg   Plan last modified:  Pearl Mendez, PharmD (10/29/2024)   Next INR check:  2024   Target end date:  --    Indications    LVAD (left ventricular assist device) present (Resolved) [Z95.811]  Anticoagulation monitoring  INR range 2-3 (Resolved) [Z79.01]                 Anticoagulation Episode Summary       INR check location:  Clinic Lab    Preferred lab:  --    Send INR reminders to:  [facility] COUMADIN LVAD    Comments:  LVAD/ MH - [facility] Lab/Lab [place] Results:[phone] Acct# [id number] Phone: [phone] [phone]/dc1/ [facility] [phone],fax [phone]/ Bridge:NO(h/o bleeds) see 3/12 encounter for meter process          Anticoagulation Care Providers       Provider Role Specialty Phone number    Antonio Hadley Jr., MD [facility] Cardiology [phone]

## 2024-12-18 ENCOUNTER — ANTI-COAG VISIT (OUTPATIENT)
Dept: CARDIOLOGY | Facility: CLINIC | Age: 59
End: 2024-12-18
Payer: MEDICARE

## 2024-12-18 ENCOUNTER — LAB VISIT (OUTPATIENT)
Dept: LAB | Facility: HOSPITAL | Age: 59
End: 2024-12-18
Attending: INTERNAL MEDICINE
Payer: MEDICARE

## 2024-12-18 DIAGNOSIS — Z95.811 HEART REPLACED BY HEART ASSIST DEVICE: ICD-10-CM

## 2024-12-18 LAB
INR PPP: 3.7 (ref 0.8–1.2)
PROTHROMBIN TIME: 37 SEC (ref 9–12.5)

## 2024-12-18 PROCEDURE — 36415 COLL VENOUS BLD VENIPUNCTURE: CPT | Performed by: INTERNAL MEDICINE

## 2024-12-18 PROCEDURE — 85610 PROTHROMBIN TIME: CPT | Performed by: INTERNAL MEDICINE

## 2024-12-18 PROCEDURE — 93793 ANTICOAG MGMT PT WARFARIN: CPT | Mod: S$GLB,,,

## 2024-12-18 NOTE — PROGRESS NOTES
Patient reports correct Warfarin dose, shaka and mustard greens and pigs feet 12/16/24, wine 12/15/24, no other changes reported.

## 2024-12-18 NOTE — PROGRESS NOTES
[facility]    12/18/2024 4:15 PM    Assessment/Plan:    Patient presents today with supratherapeutic INR.    Assessment of patient findings and chart review: Reports wine intake; also reports green intake which should have helped to lower INR     Recommendation for patient's warfarin regimen: Hold dose today then resume current maintenance dose    Recommend repeat INR Friday  _________________________________________________________________    Tim Richards (59 y.o.) is followed by the [email or web address] Anticoagulation Management Program.    Anticoagulation Summary  As of 12/18/2024      INR goal:  2.0-3.0   TTR:  68.6% (5.8 y)   INR used for dosing:  3.7 (12/18/2024)   Warfarin maintenance plan:  5 mg (5 mg x 1) every day   Weekly warfarin total:  35 mg   Plan last modified:  Pearl Mendez, PharmD (10/29/2024)   Next INR check:  12/20/2024   Target end date:  --    Indications    LVAD (left ventricular assist device) present (Resolved) [Z95.811]  Anticoagulation monitoring  INR range 2-3 (Resolved) [Z79.01]                 Anticoagulation Episode Summary       INR check location:  Clinic Lab    Preferred lab:  --    Send INR reminders to:  [facility] COUMADIN LVAD    Comments:  LVAD/ [facility] - [facility] Lab/Lab [place] Results:[phone] Acct# [id number] Phone: [phone]/ Bridge:NO(h/o bleeds) see 3/12 encounter for meter process          Anticoagulation Care Providers       Provider Role Specialty Phone number    Antonio Hadley Jr., MD [facility] Cardiology [phone]

## 2024-12-24 ENCOUNTER — LAB VISIT (OUTPATIENT)
Dept: LAB | Facility: HOSPITAL | Age: 59
End: 2024-12-24
Attending: INTERNAL MEDICINE
Payer: MEDICARE

## 2024-12-24 DIAGNOSIS — Z95.811 HEART REPLACED BY HEART ASSIST DEVICE: ICD-10-CM

## 2024-12-24 LAB
INR PPP: 1.6 (ref 0.8–1.2)
PROTHROMBIN TIME: 16.7 SEC (ref 9–12.5)

## 2024-12-24 PROCEDURE — 36415 COLL VENOUS BLD VENIPUNCTURE: CPT | Performed by: INTERNAL MEDICINE

## 2024-12-24 PROCEDURE — 85610 PROTHROMBIN TIME: CPT | Performed by: INTERNAL MEDICINE

## 2024-12-26 ENCOUNTER — ANTI-COAG VISIT (OUTPATIENT)
Dept: CARDIOLOGY | Facility: CLINIC | Age: 59
End: 2024-12-26
Payer: MEDICARE

## 2024-12-26 NOTE — PROGRESS NOTES
[facility]    2024 1:31 PM    Assessment/Plan:    Patient presents today with subtherapeutic  INR.    Assessment of patient findings and chart review: reports edema and not feeling well - will advise he contact VAD team     Recommendation for patient's warfarin regimen: Continue current maintenance dose    Recommend repeat INR tomorrow   _________________________________________________________________    Tim Richards (59 y.o.) is followed by the [employer] [facility]tico[facility] Management [facility].    Anticoagulation Summary  As of 2024      INR goal:  2.0-3.0   TTR:  68.6% (5.8 y)   INR used for dosin.6 (2024)   Warfarin maintenance plan:  5 mg (5 mg x 1) every day   Weekly warfarin total:  35 mg   Plan last modified:  Pearl Mendez, PharmD (10/29/2024)   Next INR check:  2024   Target end date:  --    Indications    LVAD (left ventricular assist device) present (Resolved) [Z95.811]  Anticoagulation monitoring  INR range 2-3 (Resolved) [Z79.01]                 Anticoagulation Episode Summary       INR check location:  Clinic Lab    Preferred lab:  --    Send INR reminders to:  [facility] COUMADIN LVAD    Comments:  LVAD/ [facility] - [facility] Lab/Lab [place] Results:[phone] Acct# [id number] Phone: [phone]/ Bridge:NO(h/o bleeds) see 3/12 encounter for meter process          Anticoagulation Care Providers       Provider Role Specialty Phone number    Antonio Hadley Jr., MD [facility] Cardiology [phone]

## 2024-12-26 NOTE — PROGRESS NOTES
Patient reports correct Warfarin dose, edema 12/23/24 (took Torsemide), not feeling well today, no other changes reported.

## 2024-12-27 ENCOUNTER — ANTI-COAG VISIT (OUTPATIENT)
Dept: CARDIOLOGY | Facility: CLINIC | Age: 59
End: 2024-12-27
Payer: MEDICARE

## 2024-12-27 ENCOUNTER — LAB VISIT (OUTPATIENT)
Dept: LAB | Facility: HOSPITAL | Age: 59
End: 2024-12-27
Attending: INTERNAL MEDICINE
Payer: MEDICARE

## 2024-12-27 DIAGNOSIS — Z95.811 HEART REPLACED BY HEART ASSIST DEVICE: ICD-10-CM

## 2024-12-27 LAB
INR PPP: 1.4 (ref 0.8–1.2)
PROTHROMBIN TIME: 15.3 SEC (ref 9–12.5)

## 2024-12-27 PROCEDURE — 36415 COLL VENOUS BLD VENIPUNCTURE: CPT | Performed by: INTERNAL MEDICINE

## 2024-12-27 PROCEDURE — 85610 PROTHROMBIN TIME: CPT | Performed by: INTERNAL MEDICINE

## 2024-12-27 NOTE — PROGRESS NOTES
Patient reports correct Warfarin dose, took Ibuprofen 12/25/24 and 12/26/2024 for headache, no other changes reported.

## 2024-12-30 ENCOUNTER — ANTI-COAG VISIT (OUTPATIENT)
Dept: CARDIOLOGY | Facility: CLINIC | Age: 59
End: 2024-12-30
Payer: MEDICARE

## 2024-12-30 ENCOUNTER — LAB VISIT (OUTPATIENT)
Dept: LAB | Facility: HOSPITAL | Age: 59
End: 2024-12-30
Attending: INTERNAL MEDICINE
Payer: MEDICARE

## 2024-12-30 DIAGNOSIS — Z95.811 HEART REPLACED BY HEART ASSIST DEVICE: ICD-10-CM

## 2024-12-30 LAB
INR PPP: 2.8 (ref 0.8–1.2)
PROTHROMBIN TIME: 29.3 SEC (ref 9–12.5)

## 2024-12-30 PROCEDURE — 85610 PROTHROMBIN TIME: CPT | Performed by: INTERNAL MEDICINE

## 2024-12-30 PROCEDURE — 93793 ANTICOAG MGMT PT WARFARIN: CPT | Mod: S$GLB,,,

## 2024-12-30 PROCEDURE — 36415 COLL VENOUS BLD VENIPUNCTURE: CPT | Performed by: INTERNAL MEDICINE

## 2024-12-30 NOTE — PROGRESS NOTES
[facility]    12/30/2024    Tim Richards (59 y.o.) is followed by the [employer] Anticoagulation Management [facility].      Assessment/Plan:    Tim Richards presents with a therapeutic INR. Goal INR: 2.0-3.0    Lab Results   Component Value Date    INR 2.8 (H) 12/30/2024    INR 1.4 (H) 12/27/2024    INR 1.6 (H) 12/24/2024       Assessment of patient findings per MA/LPN and chart review:   The following significant findings were found:   None    Recommendation for patient's warfarin regimen:   No change was made to warfarin therapy during this visit and patient has been instructed to continue their current warfarin regimen.    Recommended repeat INR in 1 week      Bryce MayesD, BCPS  Clinical Pharmacist - [employer] Anticoagulation Management Program  Preferred Contact: Secure Messaging or In Basket Message

## 2025-01-02 ENCOUNTER — PATIENT MESSAGE (OUTPATIENT)
Dept: TRANSPLANT | Facility: CLINIC | Age: 60
End: 2025-01-02
Payer: MEDICARE

## 2025-01-06 ENCOUNTER — ANTI-COAG VISIT (OUTPATIENT)
Dept: CARDIOLOGY | Facility: CLINIC | Age: 60
End: 2025-01-06
Payer: MEDICARE

## 2025-01-06 ENCOUNTER — LAB VISIT (OUTPATIENT)
Dept: LAB | Facility: HOSPITAL | Age: 60
End: 2025-01-06
Attending: INTERNAL MEDICINE
Payer: MEDICARE

## 2025-01-06 DIAGNOSIS — Z95.811 HEART REPLACED BY HEART ASSIST DEVICE: ICD-10-CM

## 2025-01-06 LAB
INR PPP: 2.3 (ref 0.8–1.2)
PROTHROMBIN TIME: 24.1 SEC (ref 9–12.5)

## 2025-01-06 PROCEDURE — 93793 ANTICOAG MGMT PT WARFARIN: CPT | Mod: S$GLB,,,

## 2025-01-06 PROCEDURE — 85610 PROTHROMBIN TIME: CPT | Performed by: INTERNAL MEDICINE

## 2025-01-06 PROCEDURE — 36415 COLL VENOUS BLD VENIPUNCTURE: CPT | Performed by: INTERNAL MEDICINE

## 2025-01-06 NOTE — PROGRESS NOTES
[facility]    2025 3:52 PM    Assessment/Plan:    Patient presents today with therapeutic INR.    Assessment of patient findings and chart review: no significant findings     Recommendation for patient's warfarin regimen: Continue current maintenance dose    Recommend repeat INR in 1 week  _________________________________________________________________    Tim Richards (59 y.o.) is followed by the [facility]ticoagulation [facility].    Anticoagulation Summary  As of 2025      INR goal:  2.0-3.0   TTR:  70.4% (5.6 y)   INR used for dosin.3 (2025)   Warfarin maintenance plan:  5 mg (5 mg x 1) every day   Weekly warfarin total:  35 mg   Plan last modified:  Pearl Mendez, PharmD (10/29/2024)   Next INR check:  2025   Target end date:  --    Indications    LVAD (left ventricular assist device) present (Resolved) [Z95.811]  Anticoagulation monitoring  INR range 2-3 (Resolved) [Z79.01]                 Anticoagulation Episode Summary       INR check location:  Clinic Lab    Preferred lab:  --    Send INR reminders to:  [facility] COUMADIN LVAD    Comments:  LVAD/ [facility] - [facility] Lab/Lab [place] Results:[phone] Acct# [id number] Phone: [phone]/ Bridge:NO(h/o bleeds) see 3/12 encounter for meter process          Anticoagulation Care Providers       Provider Role Specialty Phone number    Antonio Hadley Jr., MD [facility] Cardiology [phone]

## 2025-01-08 ENCOUNTER — PATIENT MESSAGE (OUTPATIENT)
Dept: TRANSPLANT | Facility: CLINIC | Age: 60
End: 2025-01-08
Payer: MEDICARE

## 2025-01-14 ENCOUNTER — LAB VISIT (OUTPATIENT)
Dept: LAB | Facility: HOSPITAL | Age: 60
End: 2025-01-14
Attending: INTERNAL MEDICINE
Payer: MEDICARE

## 2025-01-14 ENCOUNTER — ANTI-COAG VISIT (OUTPATIENT)
Dept: CARDIOLOGY | Facility: CLINIC | Age: 60
End: 2025-01-14
Payer: MEDICARE

## 2025-01-14 DIAGNOSIS — Z95.811 HEART REPLACED BY HEART ASSIST DEVICE: ICD-10-CM

## 2025-01-14 LAB
INR PPP: 1.1 (ref 0.8–1.2)
PROTHROMBIN TIME: 12.8 SEC (ref 9–12.5)

## 2025-01-14 PROCEDURE — 93793 ANTICOAG MGMT PT WARFARIN: CPT | Mod: S$GLB,,,

## 2025-01-14 PROCEDURE — 85610 PROTHROMBIN TIME: CPT | Performed by: INTERNAL MEDICINE

## 2025-01-14 PROCEDURE — 36415 COLL VENOUS BLD VENIPUNCTURE: CPT | Performed by: INTERNAL MEDICINE

## 2025-01-14 NOTE — PROGRESS NOTES
Patient reports correct Warfarin dose, shrimp and crawfish 1/13/2025, diarrhea 1/11/25 and 1/12/25, no other changes reported.

## 2025-01-14 NOTE — PLAN OF CARE
Occupational Therapy    Visit Type: treatment    Relevant History/Co-morbidities: Admitted from [facility] following a fall, noted to have Lt frontal hematoma without ICH. MRI revealed bilateral brain lesions concerning for mets. Additionally found to have acute on chronic anemia. PMH significant for lung CA.    prior to [facility], living at home alone and mod I with use of simon crutches. ex-partner was staying with him and providing IADL assist although he states she would not be staying at discharge from rehab (tentatively planned for 12/28 prior to current admission).    SUBJECTIVE  .   Patient / Family Goal: return to previous functional status, maximize function and return home    Pain   RN informed on pain level.    Location: right side pain, does not rate    OBJECTIVE     Cognitive Status   Level of Consciousness   - alert  Arousal Alertness   - delayed responses to stimuli  Affect/Behavior    - flat and confused  Orientation    - Oriented to: person, place, time and situation  Functional Communication   - Overall Communication Status: within functional limits   - Forms of Communication: verbal  Following Direction   - follows one step commands with increased time  Safety Awareness/Insight   - decreased awareness of need for assistance and decreased awareness of need for safety  Awareness of Deficits   - decreased awareness of deficits and assistance required to compensate for deficits  Questionable cognition this date. Patient labile throughout session.     Patient Activity Tolerance: 1 to 2 activity to rest    Observation   BLE edema     Sitting Balance  (YOANA = base of support)  Static      - Trial 1 details: with back unsupported and supervision    Standing Balance  (YOANA = base of support)  Firm Surface: Double Leg      - Static, Eyes Open       - Trial 1 details: with double UE support and contact guard  Ax2 Jaqui Jannet for safety. Noted limited standing posture.        Bed Mobility  - Sit to supine: 2 person,  Problem: Adult Inpatient Plan of Care  Goal: Plan of Care Review  Outcome: Ongoing (interventions implemented as appropriate)  No acute events throughout shift. Pt AAOx4 and afebrile. All VSS today. Pt tolerated upper and lower scope well, no complications. No interventions done during procedure. Blood levels remain stable. No signs of bleeding since. UO 650cc this shift. LVAD numbers WNL, dressing changed today, site without complications. POC reviewed with patient, all questions and concerns addressed. Will continue to monitor.       moderate assist, maximal assist  Assist for sequencing and bringing BLEs into bed.   Transfers  Assistive devices: gait belt, non-mechanical sit to stand lift  - Sit to stand: minimal assist, moderate assist, with verbal cues  - Stand to sit: moderate assist, with verbal cues  - Stand pivot: total assist - dependent  Patient completed functional transfers via Jaqui Stedy. Increased time and effort for transfers. Patient with limited standing tolerance relying heavily on BUEs on Jaqui Stedy frame.       Functional Ambulation  Recommend use of Jaqui Stedy.   Activities of Daily Living (ADLs)  Toileting:   - Toilet transfer:        - Assist: moderate assist       - Device: gait belt and non-mechanical sit to stand lift  - Assist: maximal assist  - Assist needed for: perineal hygiene and steadying  Patient completed standing from toilet with use of Jaqui Stedy. Requires max A for concepción cares. Limited standing tolerance.    Interventions    Treatment provided: activity tolerance, ADL training, balance retraining, energy conservation, functional ambulation, positioning, safety training and transfer training   -lateral weight shifting in Jaqui Stedy frame. Unable to clear feet from floor at this time.   Skilled input: verbal instruction/cues, tactile instruction/cues, facilitation and inhibition  Verbal Consent: Writer verbally educated and received verbal consent for hand placement, positioning of patient, and techniques to be performed today from patient          Education:   - Present and ready to learn: patient  Education provided during session:  - Results of above outlined education: Verbalizes understanding and Needs reinforcement    ASSESSMENT   Progress: slow progress  Interfering components: decreased activity tolerance, decreased insight into deficit, medical status limitations, mood/coping and minimal participation    Discharge needs based on today's assessment:  - Current level of function: slightly below baseline  level of function  - Therapy needs at discharge: therapy 5 or more times per week  - Activities of daily living (ADLs) requiring support at discharge: transfers, dressing and bathing  - Instrumental activities of daily living (IADLs) requiring support at discharge: home management, community mobility, emergency responses, health/medication management and meal preparation  - Impairments that require further therapy intervention: pain, activity tolerance, balance and safety awareness    AM-PAC  - Prior Level of Function: IND/MOD I ([facility] 22-24)       Key: MOD A=moderate assistance, IND/MOD I=independent/modified independent  - Generalized Current Level of Function     - Current Self-Cares: 18       Scoring Key= >21 Modified Independent; 20-21 Supervision; 18-19 Minimal assist; 13-18 Moderate assist; 9-12 Max assist; <9 Total assist    Patient requiring increased assist secondary to decreased activity tolerance and questionable cognition. Patient would benefit from ongoing occupational therapy to address deficits and optimize functional safety/independence. Discussion with RN about possibility of GOC discussion with patient secondary to patient's limited progress and participation.       PLAN (while hospitalized)  Suggestions for next session as indicated:  Repetitive sit<>stand from bedside chair, standing tolerance, safety awareness promotion, UB dressing, sink side grooming, toileting with cares, progress mobility as able, simulated tub shower transfer vs. Simulated tub transfer bench - tub transfer, reinforce e-con education with engagement in ADLs.    OT Frequency: 3-5 x per week      PT/OT Mobility Equipment for Discharge: currently requires lift equiptment  PT/OT ADL Equipment for Discharge: tub transfer bench, has reacher/sock aid, may benefit from long handled sponge    Visit # since seen by OT:  3  Agreement to plan and goals: patient agrees with goals and treatment plan      GOALS  Review Date:  1/17/2025  Long Term Goals: (to be met by time of discharge from hospital)  Grooming: Patient will complete grooming tasks in standing and at sink modified independent.  Status: progressing/ongoing  Upper body dressing: Patient will complete upper body dressing in sitting modified independent.  Lower body dressing: Patient will complete lower body dressing modified independent.  Status: progressing/ongoing  Toileting: Patient will complete toileting modified independent.  Status: progressing/ongoing  Toilet transfer: Patient will complete toilet transfer with 2-wheeled walker and bilateral crutches (pending crutches being present from rehab), modified independent.   Status: progressing/ongoing  Tub/shower transfer: Patient will complete tub/shower transfer with supervision.   Status: progressing/ongoing      Patient at End of Session:   Location: in bed  Safety measures: alarm system in place/re-engaged and call light within reach  Handoff to: nurse      Therapy procedure time and total treatment time can be found documented on the Time Entry flowsheet

## 2025-01-14 NOTE — PROGRESS NOTES
[facility]    2025 3:47 PM    Assessment/Plan:    Patient presents today with subtherapeutic  INR.    Assessment of patient findings and chart review: no significant findings     Recommendation for patient's warfarin regimen: Boost dose next 2 days to 7.5mg     Recommend repeat INR Friday  _________________________________________________________________    Tim Richards (59 y.o.) is followed by the [email or web address].    Anticoagulation Summary  As of 2025      INR goal:  2.0-3.0   TTR:  70.1% (5.7 y)   INR used for dosin.1 (2025)   Warfarin maintenance plan:  5 mg (5 mg x 1) every day   Weekly warfarin total:  35 mg   Plan last modified:  Pearl Mendez, PharmD (10/29/2024)   Next INR check:  2025   Target end date:  --    Indications    LVAD (left ventricular assist device) present (Resolved) [Z95.811]  Anticoagulation monitoring  INR range 2-3 (Resolved) [Z79.01]                 Anticoagulation Episode Summary       INR check location:  Clinic Lab    Preferred lab:  --    Send INR reminders to:  [facility] COUMADIN LVAD    Comments:  LVAD/ [facility] [place] [place] Lab/Lab [place] Results:[phone] Acct# [id number] Phone: [phone]/ Bridge:NO(h/o bleeds) see 3/12 encounter for meter process          Anticoagulation Care Providers       Provider Role Specialty Phone number    Antonio Hadley Jr., MD Responsible Cardiology [phone]

## 2025-01-17 ENCOUNTER — ANTI-COAG VISIT (OUTPATIENT)
Dept: CARDIOLOGY | Facility: CLINIC | Age: 60
End: 2025-01-17
Payer: MEDICARE

## 2025-01-17 ENCOUNTER — LAB VISIT (OUTPATIENT)
Dept: LAB | Facility: HOSPITAL | Age: 60
End: 2025-01-17
Attending: INTERNAL MEDICINE
Payer: MEDICARE

## 2025-01-17 DIAGNOSIS — Z95.811 HEART REPLACED BY HEART ASSIST DEVICE: ICD-10-CM

## 2025-01-17 LAB
INR PPP: 1.3 (ref 0.8–1.2)
PROTHROMBIN TIME: 14 SEC (ref 9–12.5)

## 2025-01-17 PROCEDURE — 93793 ANTICOAG MGMT PT WARFARIN: CPT | Mod: S$GLB,,,

## 2025-01-17 PROCEDURE — 36415 COLL VENOUS BLD VENIPUNCTURE: CPT | Performed by: INTERNAL MEDICINE

## 2025-01-17 PROCEDURE — 85610 PROTHROMBIN TIME: CPT | Performed by: INTERNAL MEDICINE

## 2025-01-17 RX ORDER — POTASSIUM CHLORIDE 20 MEQ/1
TABLET, EXTENDED RELEASE ORAL
Qty: 36 TABLET | Refills: 9 | Status: SHIPPED | OUTPATIENT
Start: 2025-01-17

## 2025-01-17 NOTE — PROGRESS NOTES
[facility]    2025 2:02 PM    Assessment/Plan:    Patient presents today with subtherapeutic  INR.    Assessment of patient findings and chart review: INR remains very low but has trended up some    Recommendation for patient's warfarin regimen: Boost dose today to 7.5mg then resume current maintenance dose    Recommend repeat INR Tuesday  _________________________________________________________________    Tim Richards (59 y.o.) is followed by the [facility].    Anticoagulation Summary  As of 2025      INR goal:  2.0-3.0   TTR:  70.1% (5.7 y)   INR used for dosin.3 (2025)   Warfarin maintenance plan:  5 mg (5 mg x 1) every day   Weekly warfarin total:  35 mg   Plan last modified:  Pearl Mendez, PharmD (10/29/2024)   Next INR check:  2025   Target end date:  --    Indications    LVAD (left ventricular assist device) present (Resolved) [Z95.811]  Anticoagulation monitoring  INR range 2-3 (Resolved) [Z79.01]                 Anticoagulation Episode Summary       INR check location:  Clinic Lab    Preferred lab:  --    Send INR reminders to:  [facility] COUMADIN LVAD    Comments:  LVAD/ [facility] [place] Lab/Lab [place] Results:[phone] Acct# [id number] Phone: [phone]/ Bridge:NO(h/o bleeds) see 3/12 encounter for meter process          Anticoagulation Care Providers       Provider Role Specialty Phone number    Antonio Hadley Jr., MD [facility] Cardiology [phone]

## 2025-01-21 NOTE — PROGRESS NOTES
01/21/2025-Spoke to pt regarding today's INR. Pt stated he can't go to lab today due to weather and will go for INR on 1/24/25. INR scheduled.

## 2025-01-23 NOTE — SUBJECTIVE & OBJECTIVE
Interval History/Significant Events: Febrile with increased pressor requirements overnight. Able to wean slowly early this morning. Now HDS on epi 0.12, levo 0.03, vaso 0.04.     Follow-up For: Procedure(s) (LRB):  CLOSURE, WOUND, STERNUM (N/A)  APPLICATION, WOUND VAC (N/A)  INSERTION, GRAFT, PERICARDIUM (N/A)  IRRIGATION, MEDIASTINUM (N/A)    Post-Operative Day: 6 Days Post-Op    Objective:     Vital Signs (Most Recent):  Temp: (!) 100.94 °F (38.3 °C) (07/21/22 1039)  Pulse: 103 (07/21/22 1039)  Resp: (!) 27 (07/21/22 0843)  BP: (!) 68/0 (07/21/22 0845)  SpO2: 97 % (07/21/22 0926)   Vital Signs (24h Range):  Temp:  [97.16 °F (36.2 °C)-102.9 °F (39.4 °C)] 100.94 °F (38.3 °C)  Pulse:  [] 103  Resp:  [20-35] 27  SpO2:  [95 %-98 %] 97 %  BP: (68-80)/(0) 68/0  Arterial Line BP: ()/(57-74) 76/67     Weight: 106.6 kg (235 lb)  Body mass index is 31.01 kg/m².      Intake/Output Summary (Last 24 hours) at 7/21/2022 1054  Last data filed at 7/21/2022 1044  Gross per 24 hour   Intake 4827.44 ml   Output 5720 ml   Net -892.56 ml       Physical Exam  Constitutional:       Comments: Intubated and sedated   HENT:      Head: Normocephalic and atraumatic.      Mouth/Throat:      Comments: OG in place  Eyes:      Pupils: Pupils are equal, round, and reactive to light.   Neck:      Comments: L IJ CVC  R IJ trialysis  Cardiovascular:      Rate and Rhythm: Regular rhythm. Tachycardia present.      Comments:   LVAD in place -- 6200 rpm, 5.0L    Midline sternotomy c/d with dressing in place    2 plerual and 2 mediastinal chest tubes to suction.    V pacing wires in place.   Pulmonary:      Comments: Mechanically ventilated  Abdominal:      General: There is no distension.      Palpations: Abdomen is soft.   Genitourinary:     Comments: Lagunas in place draining clear urine  Musculoskeletal:      Comments: ECMO cannula sites with dressing in place.     Cutdown incision with seroma when cannula removed yesterday which has  resolved.    L brachial, right radial arterial line   Skin:     General: Skin is warm and dry.   Neurological:      Comments: Following commands when sedation paused       Vents:  Vent Mode: A/C (07/21/22 1039)  Ventilator Initiated: Yes (07/15/22 1027)  Set Rate: 16 BPM (07/21/22 1039)  Vt Set: 400 mL (07/21/22 1039)  Pressure Support: 8 cmH20 (07/21/22 1039)  PEEP/CPAP: 5 cmH20 (07/21/22 1039)  Oxygen Concentration (%): 50 (07/21/22 1039)  Peak Airway Pressure: 23 cmH2O (07/21/22 1039)  Plateau Pressure: 21 cmH20 (07/21/22 1039)  Total Ve: 10.6 mL (07/21/22 1039)  Negative Inspiratory Force (cm H2O): 0 (07/21/22 1039)  F/VT Ratio<105 (RSBI): (!) 97.78 (07/21/22 0734)    Lines/Drains/Airways       Central Venous Catheter Line  Duration             Percutaneous Central Line Insertion/Assessment - Quad Lumen  07/13/22 0941 left internal jugular 8 days              Drain  Duration                  Urethral Catheter 07/13/22 0848 Temperature probe;Latex 14 Fr. 8 days         Chest Tube 07/13/22 1916 1 Right Pleural 32 Fr. 7 days         Chest Tube 07/13/22 1916 2 Left Pleural 32 Fr. 7 days         Chest Tube 07/13/22 1916 3 Anterior Mediastinal 32 Fr. 7 days         Chest Tube 07/13/22 1916 4 Posterior Mediastinal 32 Fr. 7 days         NG/OG Tube 07/15/22 1030 Left nostril 6 days              Airway  Duration                  Airway - Non-Surgical 07/13/22 0848 8 days              Arterial Line  Duration             Arterial Line 07/13/22 2000 Right Radial 7 days              Line  Duration                  VAD 03/08/18 1124 Left ventricular assist device HeartMate II 1595 days         VAD 07/13/22 1300 Left ventricular assist device HeartMate 3 7 days              Peripheral Intravenous Line  Duration                  Midline Catheter Insertion/Assessment  - Single Lumen 06/28/22 1027 Right cephalic vein (lateral side of arm) 20g x 8cm 23 days                    Significant Labs:    CBC/Anemia Profile:  Recent Labs    Lab 07/20/22  1130 07/20/22  1249 07/20/22 1951 07/20/22  2353 07/21/22  0352 07/21/22  0410 07/21/22  0735   WBC 20.15*  --  26.96*  --  32.22*  --   --    HGB 7.6*  --  7.7*  --  7.8*  --   --    HCT 24.7*   < > 25.5*   < > 26.0* 23* 25*     --  200  --  207  --   --    MCV 92  --  95  --  95  --   --    RDW 18.3*  --  18.2*  --  18.6*  --   --     < > = values in this interval not displayed.        Chemistries:  Recent Labs   Lab 07/20/22  1130 07/20/22 1951 07/21/22 0352   * 139 149*   K 4.0 3.6 3.7   * 109 113*   CO2 22* 23 24   BUN 64* 7 60*   CREATININE 2.4* 0.4* 2.7*   CALCIUM 9.4 9.0 9.2   ALBUMIN 1.9* 2.6* 2.0*   PROT 5.9* 4.3* 6.1   BILITOT 14.5* 0.9 15.9*   ALKPHOS 123 110 131   ALT 47* 15 50*   AST 77* 18 72*   MG 3.0* 2.0 3.2*   PHOS 4.3 4.4 4.4       ABGs:   Recent Labs   Lab 07/21/22  0735   PH 7.394   PCO2 42.8   HCO3 26.1   POCSATURATED 97   BE 1     Coagulation:   Recent Labs   Lab 07/21/22  0352 07/21/22  0630   INR 1.4*  --    APTT  --  43.6*     Lactic Acid:   Recent Labs   Lab 07/20/22  0038   LACTATE 1.1     All pertinent labs within the past 24 hours have been reviewed.    Significant Imaging:  I have reviewed all pertinent imaging results/findings within the past 24 hours.   PSYCHOTIC SYMPTOMS PSYCHOTIC SYMPTOMS

## 2025-01-24 ENCOUNTER — LAB VISIT (OUTPATIENT)
Dept: LAB | Facility: HOSPITAL | Age: 60
End: 2025-01-24
Attending: INTERNAL MEDICINE
Payer: MEDICARE

## 2025-01-24 DIAGNOSIS — Z95.811 HEART REPLACED BY HEART ASSIST DEVICE: ICD-10-CM

## 2025-01-24 LAB
INR PPP: 1.6 (ref 0.8–1.2)
PROTHROMBIN TIME: 17.5 SEC (ref 9–12.5)

## 2025-01-24 PROCEDURE — 36415 COLL VENOUS BLD VENIPUNCTURE: CPT | Performed by: INTERNAL MEDICINE

## 2025-01-24 PROCEDURE — 85610 PROTHROMBIN TIME: CPT | Performed by: INTERNAL MEDICINE

## 2025-01-24 NOTE — PROGRESS NOTES
01/24/2025-Spoke to pt regarding today's missed INR. Pt stated he overslept and will go for INR on 1/28/25. INR scheduled.

## 2025-01-29 ENCOUNTER — LAB VISIT (OUTPATIENT)
Dept: LAB | Facility: HOSPITAL | Age: 60
End: 2025-01-29
Attending: INTERNAL MEDICINE
Payer: MEDICARE

## 2025-01-29 DIAGNOSIS — Z95.811 HEART REPLACED BY HEART ASSIST DEVICE: ICD-10-CM

## 2025-01-29 LAB
INR PPP: 2.1 (ref 0.8–1.2)
PROTHROMBIN TIME: 22.5 SEC (ref 9–12.5)

## 2025-01-29 PROCEDURE — 85610 PROTHROMBIN TIME: CPT | Performed by: INTERNAL MEDICINE

## 2025-01-29 PROCEDURE — 36415 COLL VENOUS BLD VENIPUNCTURE: CPT | Performed by: INTERNAL MEDICINE

## 2025-01-30 ENCOUNTER — ANTI-COAG VISIT (OUTPATIENT)
Dept: CARDIOLOGY | Facility: CLINIC | Age: 60
End: 2025-01-30
Payer: MEDICARE

## 2025-01-30 DIAGNOSIS — Z00.00 ENCOUNTER FOR MEDICARE ANNUAL WELLNESS EXAM: ICD-10-CM

## 2025-02-04 ENCOUNTER — LAB VISIT (OUTPATIENT)
Dept: LAB | Facility: HOSPITAL | Age: 60
End: 2025-02-04
Attending: INTERNAL MEDICINE
Payer: MEDICARE

## 2025-02-04 ENCOUNTER — ANTI-COAG VISIT (OUTPATIENT)
Dept: CARDIOLOGY | Facility: CLINIC | Age: 60
End: 2025-02-04
Payer: MEDICARE

## 2025-02-04 ENCOUNTER — TELEPHONE (OUTPATIENT)
Dept: TRANSPLANT | Facility: CLINIC | Age: 60
End: 2025-02-04
Payer: MEDICARE

## 2025-02-04 DIAGNOSIS — Z95.811 HEART REPLACED BY HEART ASSIST DEVICE: ICD-10-CM

## 2025-02-04 LAB
INR PPP: 2.2 (ref 0.8–1.2)
PROTHROMBIN TIME: 23.6 SEC (ref 9–12.5)

## 2025-02-04 PROCEDURE — 36415 COLL VENOUS BLD VENIPUNCTURE: CPT | Performed by: INTERNAL MEDICINE

## 2025-02-04 PROCEDURE — 85610 PROTHROMBIN TIME: CPT | Performed by: INTERNAL MEDICINE

## 2025-02-04 PROCEDURE — 93793 ANTICOAG MGMT PT WARFARIN: CPT | Mod: S$GLB,,,

## 2025-02-04 NOTE — TELEPHONE ENCOUNTER
Spoke to pt. He said the insurance didn't tell him if there was a formulary medication that they would cover instead of Mexiletine. Advised I can reach out to see about patient assistance for the medication. Told him they will contact him directly. Advised I will also get him scheduled to establish care with another EP provider since Dr. Yun is no longer in the office. Patient verbalized understanding and had no further questions.

## 2025-02-04 NOTE — PROGRESS NOTES
[facility]    2025 2:53 PM    Assessment/Plan:    Patient presents today with therapeutic INR.    Assessment of patient findings and chart review: no significant findings     Recommendation for patient's warfarin regimen: Continue current maintenance dose    Recommend repeat INR in 1 week  _________________________________________________________________    Tim Richards (59 y.o.) is followed by the [facility].    Anticoagulation Summary  As of 2025      INR goal:  2.0-3.0   TTR:  70.1% (5.6 y)   INR used for dosin.2 (2025)   Warfarin maintenance plan:  5 mg (5 mg x 1) every day   Weekly warfarin total:  35 mg   Plan last modified:  Pearl Mendez, PharmD (10/29/2024)   Next INR check:  2025   Target end date:  --    Indications    LVAD (left ventricular assist device) present (Resolved) [Z95.811]  Anticoagulation monitoring  INR range 2-3 (Resolved) [Z79.01]                 Anticoagulation Episode Summary       INR check location:  Clinic Lab    Preferred lab:  --    Send INR reminders to:  [facility] COUMADIN LVAD    Comments:  LVAD/ [facility] Lab/Lab [place] Results:[phone] Acct# [id number] Phone: [phone]/ Bridge:NO(h/o bleeds) see 3/12 encounter for meter process          Anticoagulation Care Providers       Provider Role Specialty Phone number    Antonio Hadley Jr., MD [facility] Cardiology [phone]

## 2025-02-04 NOTE — TELEPHONE ENCOUNTER
Pt called to report his mexilitine is not being covered by his insurance.  Asking if we are able to assist him with this. Sending message to EP asking for assistance on Rx.

## 2025-02-05 ENCOUNTER — TELEPHONE (OUTPATIENT)
Dept: PHARMACY | Facility: CLINIC | Age: 60
End: 2025-02-05
Payer: MEDICARE

## 2025-02-05 ENCOUNTER — PATIENT MESSAGE (OUTPATIENT)
Dept: ENDOCRINOLOGY | Facility: CLINIC | Age: 60
End: 2025-02-05
Payer: MEDICARE

## 2025-02-05 ENCOUNTER — PATIENT MESSAGE (OUTPATIENT)
Dept: TRANSPLANT | Facility: CLINIC | Age: 60
End: 2025-02-05
Payer: MEDICARE

## 2025-02-05 ENCOUNTER — TELEPHONE (OUTPATIENT)
Dept: TRANSPLANT | Facility: CLINIC | Age: 60
End: 2025-02-05
Payer: MEDICARE

## 2025-02-05 DIAGNOSIS — I47.20 VT (VENTRICULAR TACHYCARDIA): Primary | ICD-10-CM

## 2025-02-05 DIAGNOSIS — T46.2X1A HYPOTHYROIDISM DUE TO AMIODARONE: Primary | ICD-10-CM

## 2025-02-05 DIAGNOSIS — E03.2 HYPOTHYROIDISM DUE TO AMIODARONE: Primary | ICD-10-CM

## 2025-02-05 RX ORDER — LEVOTHYROXINE SODIUM 125 UG/1
125 TABLET ORAL
Qty: 30 TABLET | Refills: 2 | Status: SHIPPED | OUTPATIENT
Start: 2025-02-05

## 2025-02-05 NOTE — TELEPHONE ENCOUNTER
Patient called upset because he called [facility] for a synthroid refill and was told he needs to f/u with them, but they don't have any soon appts so he is going to run out of Synthroid. I advised patient to make an appt with [facility] but a one time fill for 6 months will be given from [facility]. No more refills from [facility] will be given to patient after the 6 months.

## 2025-02-05 NOTE — TELEPHONE ENCOUNTER
Spoke to pt. Advised there is no patient assistance program available for Mexiletine. Told him that I spoke with Dr. Fay who stated there is no alternative to the medication since the patient is also on Amiodarone. Told him per Dr. Fay, he can stop Mexiletine. Pt stated Dr. Yun put him on Mexiletine since he was having twitches. Advised I submitted a PA request for Mexiletine for him to the insurance company. Told him I should hear back by Friday and I can let him know then. Patient verbalized understanding and had no further questions.

## 2025-02-05 NOTE — TELEPHONE ENCOUNTER
We have reviewed Mr. Richards current medication list and/or insurance status. Unfortunately, The Pharmacy Patient Assistance Team is unable to assist at this time due to the following reasons      There are no Manufacture or Co-pay Savings Programs available for requested medication.                 Liss Barton  Pharmacy Patient Assistance Team

## 2025-02-06 RX ORDER — LEVOTHYROXINE SODIUM 125 UG/1
125 TABLET ORAL
Qty: 30 TABLET | Refills: 5 | Status: SHIPPED | OUTPATIENT
Start: 2025-02-06

## 2025-02-10 RX ORDER — FERROUS GLUCONATE 324(37.5)
324 TABLET ORAL 2 TIMES DAILY WITH MEALS
Qty: 180 TABLET | Refills: 3 | Status: SHIPPED | OUTPATIENT
Start: 2025-02-10

## 2025-02-10 NOTE — TELEPHONE ENCOUNTER
Spoke to pt. Advised PA for Mexilitine was approved from now through 2/6/26. Patient verbalized understanding and had no further questions.

## 2025-02-13 ENCOUNTER — LAB VISIT (OUTPATIENT)
Dept: LAB | Facility: HOSPITAL | Age: 60
End: 2025-02-13
Attending: INTERNAL MEDICINE
Payer: MEDICARE

## 2025-02-13 ENCOUNTER — ANTI-COAG VISIT (OUTPATIENT)
Dept: CARDIOLOGY | Facility: CLINIC | Age: 60
End: 2025-02-13
Payer: MEDICARE

## 2025-02-13 DIAGNOSIS — Z95.811 HEART REPLACED BY HEART ASSIST DEVICE: ICD-10-CM

## 2025-02-13 LAB
INR PPP: 2 (ref 0.8–1.2)
PROTHROMBIN TIME: 21.3 SEC (ref 9–12.5)

## 2025-02-13 PROCEDURE — 36415 COLL VENOUS BLD VENIPUNCTURE: CPT | Performed by: INTERNAL MEDICINE

## 2025-02-13 PROCEDURE — 93793 ANTICOAG MGMT PT WARFARIN: CPT | Mod: S$GLB,,,

## 2025-02-13 PROCEDURE — 85610 PROTHROMBIN TIME: CPT | Performed by: INTERNAL MEDICINE

## 2025-02-13 NOTE — PROGRESS NOTES
[facility]    2025 4:12 PM    Assessment/Plan:    Patient presents today with therapeutic INR.    Assessment of patient findings and chart review: no significant findings     Recommendation for patient's warfarin regimen: Continue current maintenance dose    Recommend repeat INR in 1 week  _________________________________________________________________    Tim Richards (59 y.o.) is followed by the [email or web address].    Anticoagulation Summary  As of 2025      INR goal:  2.0-3.0   TTR:  70.3% (5.7 y)   INR used for dosin.0 (2025)   Warfarin maintenance plan:  5 mg (5 mg x 1) every day   Weekly warfarin total:  35 mg   Plan last modified:  Pearl Mendez, PharmD (10/29/2024)   Next INR check:  2025   Target end date:  --    Indications    LVAD (left ventricular assist device) present (Resolved) [Z95.811]  Anticoagulation monitoring  INR range 2-3 (Resolved) [Z79.01]                 Anticoagulation Episode Summary       INR check location:  Clinic Lab    Preferred lab:  --    Send INR reminders to:  [facility] COUMADIN LVAD    Comments:  LVAD/ [facility] [place] Lab/Lab [place] Results:[phone] Acct# [id number] Phone: [phone]/ Bridge:NO(h/o bleeds) see 3/12 encounter for meter process          Anticoagulation Care Providers       Provider Role Specialty Phone number    Antonio Hadley Jr., MD [facility] Cardiology [phone]

## 2025-02-20 ENCOUNTER — LAB VISIT (OUTPATIENT)
Dept: LAB | Facility: HOSPITAL | Age: 60
End: 2025-02-20
Attending: INTERNAL MEDICINE
Payer: MEDICARE

## 2025-02-20 ENCOUNTER — ANTI-COAG VISIT (OUTPATIENT)
Dept: CARDIOLOGY | Facility: CLINIC | Age: 60
End: 2025-02-20
Payer: MEDICARE

## 2025-02-20 DIAGNOSIS — Z95.811 HEART REPLACED BY HEART ASSIST DEVICE: ICD-10-CM

## 2025-02-20 LAB
INR PPP: 2 (ref 0.8–1.2)
PROTHROMBIN TIME: 21.5 SEC (ref 9–12.5)

## 2025-02-20 PROCEDURE — 85610 PROTHROMBIN TIME: CPT | Performed by: INTERNAL MEDICINE

## 2025-02-20 PROCEDURE — 36415 COLL VENOUS BLD VENIPUNCTURE: CPT | Performed by: INTERNAL MEDICINE

## 2025-02-20 NOTE — PROGRESS NOTES
[facility]    2025 3:55 PM    Assessment/Plan:    Patient presents today with therapeutic INR.    Assessment of patient findings and chart review: no significant findings     Recommendation for patient's warfarin regimen: Continue current maintenance dose    Recommend repeat INR in 1 week  _________________________________________________________________    Tim Richards (59 y.o.) is followed by the [facility].    Anticoagulation Summary  As of 2025      INR goal:  2.0-3.0   TTR:  70.3% (5.7 y)   INR used for dosin.0 (2025)   Warfarin maintenance plan:  5 mg (5 mg x 1) every day   Weekly warfarin total:  35 mg   Plan last modified:  Pearl Mendez, PharmD (10/29/2024)   Next INR check:  2025   Target end date:  --    Indications    LVAD (left ventricular assist device) present (Resolved) [Z95.811]  Anticoagulation monitoring  INR range 2-3 (Resolved) [Z79.01]                 Anticoagulation Episode Summary       INR check location:  Clinic Lab    Preferred lab:  --    Send INR reminders to:  [facility] COUMADIN LVAD    Comments:  LVAD/ [facility] - [facility] Lab/Lab [place] Results:[phone] Acct# [id number] Phone: [phone]/ Bridge:NO(h/o bleeds) see 3/12 encounter for meter process          Anticoagulation Care Providers       Provider Role Specialty Phone number    Antonio Haldey Jr., MD [facility] Cardiology [phone]

## 2025-02-25 NOTE — PROGRESS NOTES
Endocrinology follow up visit  03/06/2025      Subjective:      Chief Complaint: Follow-up for hypothyroidism    HPI: Tim Richards is a 59 y.o. 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  No major changes since last visit with Dr. Piedra and Dr. Brice 01/2023.    Past Medical History:   Diagnosis Date    A-fib     Anticoagulant long-term use     Atrial flutter 7/30/2022    CHF (congestive heart failure)     Class 1 obesity due to excess calories with serious comorbidity and body mass index (BMI) of 31.0 to 31.9 in adult     Class 1 obesity due to excess calories with serious comorbidity and body mass index (BMI) of 32.0 to 32.9 in adult     Dilated cardiomyopathy 1/10/2018    Disorder of kidney and ureter     CKD    Encounter for blood transfusion     Essential hypertension 8/28/2022    Gout     HTN (hypertension)     Hx of psychiatric care     ICD (implantable cardioverter-defibrillator) infection 7/1/2020    Psychiatric problem     Thyroid disease     Ventricular tachycardia (paroxysmal)         With regards to amiodarone induced hypothyroidism:    He initially developed amiodarone-induced hyperthyroidism (Type 2 AIT) in 7/2019. He required a prolonged course of PDN and MMI, then subsequently developed hypothyroidism in 5/2020. He was then hospitalized in the summer of 2022 for a prolonged period of time and TFTs were c/w hyperthyroidism again and he was placed back on steroids and MMI for AIT. Fast resolution of hyperthyroidism with quick taper of steroids and MMI. He was ultimately restarted on low dose levothyroxine at time of hospital discharge at the end of august 2022 with up-titration outpatient based on TFTs.    He is still on Amiodarine 400 mg daily.    Currently taking:  Generic Levothyroxine 125 mcg daily.    He takes it appropriately on an empty stomach and waits at least 30 minutes prior to eating.  He does take iron supplements, which he usually takes  in the afternoons and at least 4 hours away from levothyroxine.    Denies biotin supplementation.    Lab Results   Component Value Date    TSH 7.682 (H) 07/25/2024    TSH 11.576 (H) 09/08/2023    TSH 8.370 (H) 07/24/2023    TSH 8.021 (H) 06/06/2023    TSH 11.914 (H) 02/01/2023    FREET4 1.33 07/25/2024    FREET4 1.33 09/08/2023    FREET4 1.12 07/24/2023    FREET4 1.09 06/06/2023    FREET4 1.42 02/01/2023     Chronic fatigue  Cold intolerance  Constipation- week  Hair thinning  Denies weight changes    Reviewed past medical, family, social history and updated as appropriate.    ROS as above.  Objective:     Physical Exam  Constitutional:       Appearance: Normal appearance.   Eyes:      Comments: Left eye contusion   Neck:      Thyroid: No thyroid mass, thyromegaly or thyroid tenderness.   Cardiovascular:      Rate and Rhythm: Normal rate.      Comments: No edema  Pulmonary:      Effort: Pulmonary effort is normal.   Neurological:      Mental Status: He is alert.   Psychiatric:         Mood and Affect: Mood normal.         Behavior: Behavior normal.           Wt Readings from Last 10 Encounters:   03/06/25 0843 106 kg (233 lb 9.2 oz)   11/06/24 0925 105.6 kg (232 lb 12.9 oz)   11/06/24 0940 103.9 kg (229 lb)   10/22/24 1326 105.6 kg (232 lb 12.9 oz)   07/25/24 1254 111.1 kg (245 lb)   07/25/24 1341 108.4 kg (239 lb)   07/08/24 1123 103.9 kg (229 lb)   04/17/24 1123 112.9 kg (248 lb 14.4 oz)   03/07/24 1321 108 kg (238 lb)   03/07/24 1406 107 kg (236 lb)       Lab Results   Component Value Date    HGBA1C 5.4 04/26/2021    HGBA1C 5.5 03/08/2018    HGBA1C 5.4 01/23/2018    HGBA1C 5.6 08/04/2016     Lab Results   Component Value Date    CHOL 245 (H) 11/06/2024    CHOL 250 (H) 03/07/2024     (H) 11/06/2024     (H) 03/07/2024    LDLCALC 95.0 11/06/2024    LDLCALC 114.8 03/07/2024    TRIG 75 11/06/2024    TRIG 126 03/07/2024    CHOLHDL 55.1 (H) 11/06/2024    CHOLHDL 44.0 03/07/2024     Lab Results   Component  "Value Date     11/06/2024    K 3.7 11/06/2024     11/06/2024    CO2 22 (L) 11/06/2024    GLU 69 (L) 11/06/2024    BUN 13 11/06/2024    CREATININE 1.9 (H) 11/06/2024    CALCIUM 9.5 11/06/2024    PROT 8.1 11/06/2024    ALBUMIN 3.7 11/06/2024    BILITOT 0.7 11/06/2024    ALKPHOS 109 11/06/2024    AST 67 (H) 11/06/2024    ALT 65 (H) 11/06/2024    ANIONGAP 12 11/06/2024    ESTGFRAFRICA 37.6 (A) 07/31/2022    EGFRNONAA 32.5 (A) 07/31/2022    TSH 7.682 (H) 07/25/2024      No results found for: "MICALBCREAT"    Assessment/Plan:     Amiodarone-induced hyperthyroidism  He has had transient amiodarone-induced hyperthyroidism multiple times in the past, last in 8/2022 however - resolved and MMI and steroids weaned off each time.  Now with subsequent hypothyroidism necessitating levothyroxine     Other specified hypothyroidism   - Now with Amiodarone-induced hypothyroidism   - Labs : check TSH and free T4   - Clinically and biochemically hypothyroid   - Goal is a normal TSH   - Avoid exogenous hyperthyroidism as this can accelerate bone loss and increase risk of CV complications.     Medication Changes: continue Levothyroxine 125 mcg daily for now pending lab results   - If TSH elevated, will increase LT4 dose     - Advised to take LT4 on an empty stomach with water and to wait 30-45 minutes before eating or taking other medications    - Calcium and iron need to be  from thyroid hormone replacement by 4 or > hours.   - Please note: if you are taking biotin please hold it for 5 days prior to labs as it can interfere with the thyroid testing.   - Reviewed usual times of thyroid hormone changes   - Reviewed that symptoms of hypothyroidism may not correlate with tsh, and a normal TSH is the goal of therapy. Symptoms are not a justification for over treatment.    LVAD (left ventricular assist device) present  LVAD in place, follows closely with HTS  Due to cardiac hx, goal TSH 4-7    Follow up in about 1 year " (around 3/6/2026).      Visit today included increased complexity associated with the care of the problems addressed and managing the longitudinal care of the patient due to the serious and/or complex managed problems.      Delaney Tejada PA-C  [facility], 6th [address]  [address] [place], [place], [address]    Office: [phone]  Fax: [phone]

## 2025-02-26 NOTE — NURSING
Page received from EMILY Ram. DLES dressing is having to be changed TID due to the amount of drainage. I gave the OK for soap and water dressing to be used until drainage decreased. Leanna also sent pictures of DLES. Dr. Kaufman and ID are aware.                        Pt with chronic nosebleeds. Had multiple cauterizations in the past. ENT consulted. Holding anticoagulation. Cauterization per ENT.

## 2025-02-27 ENCOUNTER — LAB VISIT (OUTPATIENT)
Dept: LAB | Facility: HOSPITAL | Age: 60
End: 2025-02-27
Attending: INTERNAL MEDICINE
Payer: MEDICARE

## 2025-02-27 ENCOUNTER — ANTI-COAG VISIT (OUTPATIENT)
Dept: CARDIOLOGY | Facility: CLINIC | Age: 60
End: 2025-02-27
Payer: MEDICARE

## 2025-02-27 DIAGNOSIS — Z95.811 HEART REPLACED BY HEART ASSIST DEVICE: ICD-10-CM

## 2025-02-27 LAB
INR PPP: 2.2 (ref 0.8–1.2)
PROTHROMBIN TIME: 23.3 SEC (ref 9–12.5)

## 2025-02-27 PROCEDURE — 36415 COLL VENOUS BLD VENIPUNCTURE: CPT | Performed by: INTERNAL MEDICINE

## 2025-02-27 PROCEDURE — 85610 PROTHROMBIN TIME: CPT | Performed by: INTERNAL MEDICINE

## 2025-02-28 NOTE — PROGRESS NOTES
[facility]    2025 8:50 AM    Assessment/Plan:    Patient presents today with therapeutic INR.    Assessment of patient findings and chart review: no significant findings     Recommendation for patient's warfarin regimen: Continue current maintenance dose    Recommend repeat INR in 1 week  _________________________________________________________________    Tim Richards (59 y.o.) is followed by the [facility].    Anticoagulation Summary  As of 2025      INR goal:  2.0-3.0   TTR:  70.2% (5.6 y)   INR used for dosin.2 (2025)   Warfarin maintenance plan:  5 mg (5 mg x 1) every day   Weekly warfarin total:  35 mg   Plan last modified:  Pearl Mendez, PharmD (10/29/2024)   Next INR check:  3/6/2025   Target end date:  --    Indications    LVAD (left ventricular assist device) present (Resolved) [Z95.811]  Anticoagulation monitoring  INR range 2-3 (Resolved) [Z79.01]                 Anticoagulation Episode Summary       INR check location:  Clinic Lab    Preferred lab:  --    Send INR reminders to:  [facility] COUMADIN LVAD    Comments:  LVAD/ [facility]/Lab [facility] Results:[phone] Acct# [id number] Phone: [phone]/ Bridge:NO(h/o bleeds) see 3/12 encounter for meter process          Anticoagulation Care Providers       Provider Role Specialty Phone number    Antonio Hadley Jr., MD [facility] [phone]

## 2025-03-04 NOTE — PLAN OF CARE
Addended by: SHIRA DAIGLE on: 3/4/2025 09:00 AM     Modules accepted: Orders     Pt maintained free from falls/trauma/injuries and skin breakdown. Pt c/o pain at right groin when coughing and scheduled tylenol given. No alarms noted overnight and LVAD hum smooth. Heparin going at 12 units/kg/hr. Lab drawn. Pt doppler elevated and resolved without any intervention (see previous note). Contact precaution maintained. Plan of care reviewed. Pt verbalized understanding. All questions and concerns addressed. Will continue to monitor.

## 2025-03-05 NOTE — PROCEDURES
Tim Richards is a 58 y.o.  male patient, who presents for a 6 minute walk test ordered by MD Shae.  The diagnosis is Left Ventricular Assist Device; Cardiomyopathy.  The patient's BMI is 32.3 kg/m2.  Predicted distance (lower limit of normal) is 410.22 meters.      Test Results:    The test was not completed.  The patient stopped 1 time for a total of 114 seconds.  The total time walked was 246 seconds.  During walking, the patient reported:  Dyspnea, Leg pain, Lightheadedness.  The patient used no assistive devices during testing.     07/25/2024---------Distance: 182.88 meters (600 feet)     O2 Sat % Supplemental Oxygen Heart Rate Blood Pressure Renan Scale   Pre-exercise  (Resting) 97 % Room Air 30 bpm Unable to obtain 2   During Exercise 96 % Room Air 30 bpm Unable to obtain 7-8   Post-exercise  (Recovery) 97 % Room Air  30 bpm       Recovery Time:  31 seconds                        The patient walked the first 15 feet in 5.21 seconds.    Performing nurse/tech: César DIAZ      PREVIOUS STUDY:   03/07/2024---------Distance: 280.42 meters (920 feet)       O2 Sat % Supplemental Oxygen Heart Rate Blood Pressure Renan Scale   Pre-exercise  (Resting) 95 % Room Air 30 bpm Unable to obtain 3   During Exercise 98 % Room Air 30 bpm Unable to obtain 7-8   Post-exercise  (Recovery) 97 % Room Air  30 bpm          Recovery Time:  69 seconds                        The patient walked the first 15 feet in 4.44 seconds.      CLINICAL INTERPRETATION:  Six minute walk distance is 182.88 meters (600 feet) with very heavy dyspnea.  During exercise, there was no significant desaturation while breathing room air.  Heart rate remained stable with walking.  Bradycardia was present prior to exercise.  The patient reported non-pulmonary symptoms during exercise.  Significant exercise impairment is likely due to cardiovascular causes and subjective symptoms.  The patient did not complete the study, walking 246 seconds of  the 360 second test.  Since the previous study in March 2024, exercise capacity is significantly worse.  Based upon age and body mass index, exercise capacity is less than predicted.   Opt out

## 2025-03-06 ENCOUNTER — HOSPITAL ENCOUNTER (INPATIENT)
Facility: HOSPITAL | Age: 60
LOS: 2 days | Discharge: HOME | DRG: 897 | End: 2025-03-08
Attending: INTERNAL MEDICINE | Admitting: INTERNAL MEDICINE
Payer: MEDICARE

## 2025-03-06 ENCOUNTER — OFFICE VISIT (OUTPATIENT)
Dept: ENDOCRINOLOGY | Facility: CLINIC | Age: 60
End: 2025-03-06
Payer: MEDICARE

## 2025-03-06 ENCOUNTER — ANTI-COAG VISIT (OUTPATIENT)
Dept: CARDIOLOGY | Facility: CLINIC | Age: 60
End: 2025-03-06
Payer: MEDICARE

## 2025-03-06 ENCOUNTER — TELEPHONE (OUTPATIENT)
Dept: TRANSPLANT | Facility: CLINIC | Age: 60
End: 2025-03-06
Payer: MEDICARE

## 2025-03-06 ENCOUNTER — PATIENT MESSAGE (OUTPATIENT)
Dept: TRANSPLANT | Facility: CLINIC | Age: 60
End: 2025-03-06
Payer: MEDICARE

## 2025-03-06 ENCOUNTER — RESULTS FOLLOW-UP (OUTPATIENT)
Dept: ENDOCRINOLOGY | Facility: CLINIC | Age: 60
End: 2025-03-06

## 2025-03-06 VITALS — HEIGHT: 73 IN | BODY MASS INDEX: 30.95 KG/M2 | WEIGHT: 233.56 LBS

## 2025-03-06 DIAGNOSIS — F10.90 ALCOHOL USE DISORDER: Chronic | ICD-10-CM

## 2025-03-06 DIAGNOSIS — T46.2X1A HYPOTHYROIDISM DUE TO AMIODARONE: Primary | ICD-10-CM

## 2025-03-06 DIAGNOSIS — Z95.811 LVAD (LEFT VENTRICULAR ASSIST DEVICE) PRESENT: Chronic | ICD-10-CM

## 2025-03-06 DIAGNOSIS — E03.8 OTHER SPECIFIED HYPOTHYROIDISM: ICD-10-CM

## 2025-03-06 DIAGNOSIS — I51.89 COMBINED SYSTOLIC AND DIASTOLIC CARDIAC DYSFUNCTION: ICD-10-CM

## 2025-03-06 DIAGNOSIS — T46.2X5A AMIODARONE-INDUCED HYPERTHYROIDISM: ICD-10-CM

## 2025-03-06 DIAGNOSIS — R53.1 DECREASED STRENGTH: Primary | ICD-10-CM

## 2025-03-06 DIAGNOSIS — E03.2 HYPOTHYROIDISM DUE TO AMIODARONE: Primary | ICD-10-CM

## 2025-03-06 DIAGNOSIS — F10.229: ICD-10-CM

## 2025-03-06 DIAGNOSIS — R29.6 FALLS FREQUENTLY: ICD-10-CM

## 2025-03-06 DIAGNOSIS — E05.80 AMIODARONE-INDUCED HYPERTHYROIDISM: ICD-10-CM

## 2025-03-06 DIAGNOSIS — F10.930 ALCOHOL WITHDRAWAL, UNCOMPLICATED: ICD-10-CM

## 2025-03-06 DIAGNOSIS — F41.8 DEPRESSION WITH ANXIETY: Chronic | ICD-10-CM
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
## 2025-03-06 LAB
ANION GAP SERPL CALC-SCNC: 13 MMOL/L (ref 8–16)
APTT PPP: 55.5 SEC (ref 21–32)
BASOPHILS # BLD AUTO: 0.03 K/UL (ref 0–0.2)
BASOPHILS NFR BLD: 0.5 % (ref 0–1.9)
BUN SERPL-MCNC: 27 MG/DL (ref 6–20)
CALCIUM SERPL-MCNC: 9.9 MG/DL (ref 8.7–10.5)
CHLORIDE SERPL-SCNC: 98 MMOL/L (ref 95–110)
CO2 SERPL-SCNC: 25 MMOL/L (ref 23–29)
CREAT SERPL-MCNC: 2.9 MG/DL (ref 0.5–1.4)
DIFFERENTIAL METHOD BLD: ABNORMAL
EOSINOPHIL # BLD AUTO: 0.1 K/UL (ref 0–0.5)
EOSINOPHIL NFR BLD: 1.1 % (ref 0–8)
ERYTHROCYTE [DISTWIDTH] IN BLOOD BY AUTOMATED COUNT: 14.1 % (ref 11.5–14.5)
EST. GFR  (NO RACE VARIABLE): 24.3 ML/MIN/1.73 M^2
FOLATE SERPL-MCNC: 6.6 NG/ML (ref 4–24)
GLUCOSE SERPL-MCNC: 82 MG/DL (ref 70–110)
HCT VFR BLD AUTO: 43.9 % (ref 40–54)
HGB BLD-MCNC: 14 G/DL (ref 14–18)
IMM GRANULOCYTES # BLD AUTO: 0.02 K/UL (ref 0–0.04)
IMM GRANULOCYTES NFR BLD AUTO: 0.4 % (ref 0–0.5)
INR PPP: 4.1 (ref 0.8–1.2)
LDH SERPL L TO P-CCNC: 262 U/L (ref 110–260)
LYMPHOCYTES # BLD AUTO: 1 K/UL (ref 1–4.8)
LYMPHOCYTES NFR BLD: 18.8 % (ref 18–48)
MAGNESIUM SERPL-MCNC: 2.1 MG/DL (ref 1.6–2.6)
MCH RBC QN AUTO: 29.7 PG (ref 27–31)
MCHC RBC AUTO-ENTMCNC: 31.9 G/DL (ref 32–36)
MCV RBC AUTO: 93 FL (ref 82–98)
MONOCYTES # BLD AUTO: 0.9 K/UL (ref 0.3–1)
MONOCYTES NFR BLD: 16.8 % (ref 4–15)
NEUTROPHILS # BLD AUTO: 3.4 K/UL (ref 1.8–7.7)
NEUTROPHILS NFR BLD: 62.4 % (ref 38–73)
NRBC BLD-RTO: 0 /100 WBC
PHOSPHATE SERPL-MCNC: 2.1 MG/DL (ref 2.7–4.5)
PLATELET # BLD AUTO: 201 K/UL (ref 150–450)
PMV BLD AUTO: 10.5 FL (ref 9.2–12.9)
POTASSIUM SERPL-SCNC: 3.9 MMOL/L (ref 3.5–5.1)
PROTHROMBIN TIME: 40.7 SEC (ref 9–12.5)
RBC # BLD AUTO: 4.72 M/UL (ref 4.6–6.2)
SODIUM SERPL-SCNC: 136 MMOL/L (ref 136–145)
VIT B12 SERPL-MCNC: 655 PG/ML (ref 210–950)
WBC # BLD AUTO: 5.48 K/UL (ref 3.9–12.7)

## 2025-03-06 PROCEDURE — 80048 BASIC METABOLIC PNL TOTAL CA: CPT

## 2025-03-06 PROCEDURE — 36415 COLL VENOUS BLD VENIPUNCTURE: CPT | Mod: XB

## 2025-03-06 PROCEDURE — 25000003 PHARM REV CODE 250

## 2025-03-06 PROCEDURE — 27000248 HC VAD-ADDITIONAL DAY

## 2025-03-06 PROCEDURE — 82746 ASSAY OF FOLIC ACID SERUM: CPT

## 2025-03-06 PROCEDURE — 25000003 PHARM REV CODE 250: Performed by: INTERNAL MEDICINE

## 2025-03-06 PROCEDURE — 82607 VITAMIN B-12: CPT

## 2025-03-06 PROCEDURE — 25000003 PHARM REV CODE 250: Performed by: STUDENT IN AN ORGANIZED HEALTH CARE EDUCATION/TRAINING PROGRAM

## 2025-03-06 PROCEDURE — 85025 COMPLETE CBC W/AUTO DIFF WBC: CPT

## 2025-03-06 PROCEDURE — 94761 N-INVAS EAR/PLS OXIMETRY MLT: CPT

## 2025-03-06 PROCEDURE — 99999 PR PBB SHADOW E&M-EST. PATIENT-LVL III: CPT | Mod: PBBFAC,,,

## 2025-03-06 PROCEDURE — 99223 1ST HOSP IP/OBS HIGH 75: CPT | Mod: 25,GC,, | Performed by: INTERNAL MEDICINE

## 2025-03-06 PROCEDURE — 20600001 HC STEP DOWN PRIVATE ROOM

## 2025-03-06 PROCEDURE — 83735 ASSAY OF MAGNESIUM: CPT

## 2025-03-06 PROCEDURE — 83615 LACTATE (LD) (LDH) ENZYME: CPT

## 2025-03-06 PROCEDURE — 85610 PROTHROMBIN TIME: CPT | Mod: 91

## 2025-03-06 PROCEDURE — 84100 ASSAY OF PHOSPHORUS: CPT

## 2025-03-06 PROCEDURE — 85730 THROMBOPLASTIN TIME PARTIAL: CPT

## 2025-03-06 PROCEDURE — 93750 INTERROGATION VAD IN PERSON: CPT | Mod: ,,, | Performed by: INTERNAL MEDICINE

## 2025-03-06 RX ORDER — MUPIROCIN 20 MG/G
OINTMENT TOPICAL 2 TIMES DAILY
Status: DISCONTINUED | OUTPATIENT
Start: 2025-03-06 | End: 2025-03-08 | Stop reason: HOSPADM

## 2025-03-06 RX ORDER — AMIODARONE HYDROCHLORIDE 200 MG/1
400 TABLET ORAL DAILY
Status: DISCONTINUED | OUTPATIENT
Start: 2025-03-07 | End: 2025-03-08 | Stop reason: HOSPADM

## 2025-03-06 RX ORDER — FERROUS GLUCONATE 324(37.5)
324 TABLET ORAL 2 TIMES DAILY WITH MEALS
Status: DISCONTINUED | OUTPATIENT
Start: 2025-03-07 | End: 2025-03-08 | Stop reason: HOSPADM

## 2025-03-06 RX ORDER — LORAZEPAM 2 MG/ML
2 INJECTION INTRAMUSCULAR
Status: DISCONTINUED | OUTPATIENT
Start: 2025-03-06 | End: 2025-03-07

## 2025-03-06 RX ORDER — PANTOPRAZOLE SODIUM 40 MG/1
40 TABLET, DELAYED RELEASE ORAL
Status: DISCONTINUED | OUTPATIENT
Start: 2025-03-07 | End: 2025-03-08 | Stop reason: HOSPADM

## 2025-03-06 RX ORDER — LORAZEPAM 1 MG/1
2 TABLET ORAL EVERY 4 HOURS PRN
Status: DISCONTINUED | OUTPATIENT
Start: 2025-03-06 | End: 2025-03-07

## 2025-03-06 RX ORDER — MEXILETINE HYDROCHLORIDE 250 MG/1
250 CAPSULE ORAL EVERY 8 HOURS
Status: DISCONTINUED | OUTPATIENT
Start: 2025-03-06 | End: 2025-03-08 | Stop reason: HOSPADM

## 2025-03-06 RX ORDER — AMLODIPINE BESYLATE 10 MG/1
10 TABLET ORAL DAILY
Status: DISCONTINUED | OUTPATIENT
Start: 2025-03-07 | End: 2025-03-08 | Stop reason: HOSPADM

## 2025-03-06 RX ORDER — LORAZEPAM 1 MG/1
2 TABLET ORAL EVERY 4 HOURS PRN
Status: DISCONTINUED | OUTPATIENT
Start: 2025-03-06 | End: 2025-03-06

## 2025-03-06 RX ORDER — ALPRAZOLAM 0.5 MG/1
1 TABLET ORAL 2 TIMES DAILY PRN
Status: DISCONTINUED | OUTPATIENT
Start: 2025-03-06 | End: 2025-03-06

## 2025-03-06 RX ORDER — OMEGA-3-ACID ETHYL ESTERS 1 G/1
2 CAPSULE, LIQUID FILLED ORAL 2 TIMES DAILY
Status: DISCONTINUED | OUTPATIENT
Start: 2025-03-06 | End: 2025-03-08 | Stop reason: HOSPADM

## 2025-03-06 RX ORDER — MIRTAZAPINE 15 MG/1
30 TABLET, FILM COATED ORAL NIGHTLY
Status: DISCONTINUED | OUTPATIENT
Start: 2025-03-06 | End: 2025-03-08 | Stop reason: HOSPADM

## 2025-03-06 RX ADMIN — MEXILETINE HYDROCHLORIDE 250 MG: 250 CAPSULE ORAL at 10:03

## 2025-03-06 RX ADMIN — OMEGA-3-ACID ETHYL ESTERS 2 G: 1 CAPSULE, LIQUID FILLED ORAL at 10:03

## 2025-03-06 RX ADMIN — MIRTAZAPINE 30 MG: 15 TABLET, FILM COATED ORAL at 09:03

## 2025-03-06 RX ADMIN — MUPIROCIN: 20 OINTMENT TOPICAL at 10:03

## 2025-03-06 NOTE — PROGRESS NOTES
Patient reports correct Warfarin dose, pt fell, hit his head and twisted his knee on 3/4/25, saw LVAD Team for the fall, has been taking Ibuprofen since the fall, had alcohol on 3/4/25, no other changes reported.

## 2025-03-06 NOTE — ASSESSMENT & PLAN NOTE
-associated with etoh intoxication has L orbit ecchymosis and R knee swelling, no neurological deficits on exam, no HA neck pain, changes in vision, able to bear weight on knee no joint instability     -nursing to do local wound care to superficial laceration to L temporal area   -CT head non contrast ordered   -R knee XR ordered

## 2025-03-06 NOTE — PROGRESS NOTES
[facility]    03/06/2025    Tim Richards (59 y.o.) is followed by the [facility].      Assessment/Plan:    Tim Richards presents with a supratherapeutic INR. Goal INR: 2.0-3.0    Lab Results   Component Value Date    INR 3.3 (H) 03/06/2025    INR 2.2 (H) 02/27/2025    INR 2.0 (H) 02/20/2025       Assessment of patient findings per MA/LPN and chart review:   The following significant findings were found:   Patient reports correct Warfarin dose, pt fell, hit his head and twisted his knee on 3/4/25, saw LVAD Team for the fall, has been taking Ibuprofen since the fall, had alcohol on 3/4/25, no other changes reported.     Recommendation for patient's warfarin regimen:   Due to supratherapeutic INR, patient was instructed to SKIP their next 1 warfarin doses.    Recommended repeat INR in 4 days      Radha Eugene, PharmD, BCPS  Clinical Pharmacist - [facility]  Preferred Contact: Secure Messaging or In Basket Message

## 2025-03-06 NOTE — HPI
Mr. Richards is a 58 yo black male with stage D HFrEF who was previously supported with a HM2 (implanted 3/8/2018 as DT-VAD) but required pump exchange (HM3 pump exchange 7/13/2022) for thrombosis of pump post COVID-19 PNA and AHRF. His post-op course following pump exchange was prolonged and complicated by worsening cardiogenic shock/RV failure requiring VA-ECMO. He also had recurrent VT as well as atrial flutter with RVR and is on chronic amiodarone and mexiletine. In June/July 2022 he was tapered of steroids (on for amiodarone hyperthyroid) due to concern for avascular necrosis of hip. 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 He completed course of ertapenem and voriconazole.     He reached out to the heart failure clinic in regards to his alcohol problem and wanted help with detox as most facilities wouldn't accept a patient with an lvad. Patient reports correct Warfarin dose, pt fell, hit his head and twisted his knee on 3/4/25, saw LVAD Team for the fall, has been taking Ibuprofen since the fall, had alcohol on 3/4/25, no other changes reported.     He reports drinking alcohol for about the past year and a half approximately 5 days a week drinks about a 3rd of a L bottle of liquor daily.  He reports he does not drink 1st thing in the morning occasionally he will start drinking around 3:00 p.m..  He has never had withdrawals in the past but does report hand tremors that improve with drinking alcohol.  He reports his father was an alcoholic and him in his brother have had issues with it in the past.  He recognizes that his drinking is a problem and has resulted in multiple injuries from losing his balance and stumbling.  He reports his last drink was Tuesday.  He  denies any anxiety, tremors, or hallucinations.  On Tuesday he was drinking and lost his balance and fell and his wife reports trying to get up multiple times it continued to fall and fell and hit his face on the TV stand also have a right knee injury with swelling at the proximal area of the joint.  He reports he is able to bear weight but has been using a cane to walk.  He walked here from parking lot for without issue.  He has been taking his medications as prescribed.  He reports some ongoing anxiety especially related to getting shocked by his ICD.  He was last shocked 4-1/2 months ago.

## 2025-03-06 NOTE — TELEPHONE ENCOUNTER
Called patient in regards to report that he fell and twisted his knee and bruised his face. Pateint reported fall happened 2 days ago. He did not have it evaluated, said he is having no issues walking just a black eye with no vision issues. Instructed patient if he has any symptoms, difficulty walking, headache, swelling etc, he needs to report to an ER immediately. Patient verbalized understanding and reiterated he is ok at this time.

## 2025-03-06 NOTE — ASSESSMENT & PLAN NOTE
Procedure: Device Interrogation Including analysis of device parameters  Current Settings: Ventricular Assist Device  Review of device function is stable/unstable stable        3/6/2025     3:29 PM 11/6/2024    10:02 AM 3/7/2024     2:17 PM 9/8/2023    10:19 AM 9/8/2023     8:29 AM 8/31/2023    11:48 AM 5/31/2023     2:27 PM   TXP LVAD INTERROGATIONS   Type HeartMate3   HeartMate3 HeartMate3     Flow 5.6   5.7 4.4     Speed 6300   6300 6300     PI 1.6   1.4 3.9     Power ([place]) 5.7   5.5 5.2     LSL 5900         Pulsatility  No Pulse No Pulse No Pulse  No Pulse Pulse

## 2025-03-06 NOTE — H&P
[facility] Cardiac [facility]ensive [facility]  Heart Transplant  H&P    Patient Name: Tim Richards  MRN: [id number]  Admission Date: 3/6/2025  Attending Physician: Gaurav Rodriguez MD  Primary Care Provider: Diego Daniel MD  Principal Problem:<principal problem not specified>    Subjective:     History of Present Illness:  Mr. Richards is a 60 yo black male with stage D HFrEF who was previously supported with a HM2 (implanted 3/8/2018 as DT-VAD) but required pump exchange (HM3 pump exchange 7/13/2022) for thrombosis of pump post COVID-19 PNA and AHRF. His post-op course following pump exchange was prolonged and complicated by worsening cardiogenic shock/RV failure requiring VA-ECMO. He also had recurrent VT as well as atrial flutter with RVR and is on chronic amiodarone and mexiletine. In June/July 2022 he was tapered of steroids (on for amiodarone hyperthyroid) due to concern for avascular necrosis of hip. 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 He completed course of ertapenem and voriconazole.     He reached out to the heart failure clinic in regards to his alcohol problem and wanted help with detox as most facilities wouldn't accept a patient with an lvad. Patient reports correct Warfarin dose, pt fell, hit his head and twisted his knee on 3/4/25, saw LVAD Team for the fall, has been taking Ibuprofen since the fall, had alcohol on 3/4/25, no other changes reported.     He reports drinking alcohol for about the past year and a half approximately 5 days a week drinks about a 3rd of a L bottle of liquor daily.  He reports he does not drink 1st thing in the morning occasionally he will start drinking around 3:00 p.m..  He has never had withdrawals in the past but does report  hand tremors that improve with drinking alcohol.  He reports his father was an alcoholic and him in his brother have had issues with it in the past.  He recognizes that his drinking is a problem and has resulted in multiple injuries from losing his balance and stumbling.  He reports his last drink was Tuesday.  He denies any anxiety, tremors, or hallucinations.  On Tuesday he was drinking and lost his balance and fell and his wife reports trying to get up multiple times it continued to fall and fell and hit his face on the TV stand also have a right knee injury with swelling at the proximal area of the joint.  He reports he is able to bear weight but has been using a cane to walk.  He walked here from parking [employer] for without issue.  He has been taking his medications as prescribed.  He reports some ongoing anxiety especially related to getting shocked by his ICD.  He was last shocked 4-1/2 months ago.    Past Medical History:   Diagnosis Date    A-fib     Anticoagulant long-term use     Atrial flutter 7/30/2022    CHF (congestive heart failure)     Class 1 obesity due to excess calories with serious comorbidity and body mass index (BMI) of 31.0 to 31.9 in adult     Class 1 obesity due to excess calories with serious comorbidity and body mass index (BMI) of 32.0 to 32.9 in adult     Dilated cardiomyopathy 1/10/2018    Disorder of kidney and ureter     CKD    Encounter for blood transfusion     Essential hypertension 8/28/2022    Gout     HTN (hypertension)     Hx of psychiatric care     ICD (implantable cardioverter-defibrillator) infection 7/1/2020    Psychiatric problem     Thyroid disease     Ventricular tachycardia (paroxysmal)        Past Surgical History:   Procedure Laterality Date    AORTIC VALVULOPLASTY N/A 07/13/2022    Procedure: REPAIR, AORTIC VALVE;  Surgeon: Yg Kaufman MD;  Location: [facility] OR [address] [place] [address];  Service: Cardiovascular;  Laterality: N/A;    APPLICATION OF WOUND VACUUM-ASSISTED CLOSURE DEVICE  N/A 07/15/2022    Procedure: APPLICATION, WOUND VAC;  Surgeon: Yg Kaufman MD;  Location: [facility] OR 2ND FLR;  Service: Cardiovascular;  Laterality: N/A;  50 x 5 cm     APPLICATION OF WOUND VACUUM-ASSISTED CLOSURE DEVICE Right 09/27/2022    Procedure: APPLICATION, WOUND VAC;  Surgeon: Kole Tabares MD;  Location: [facility] OR 2ND FLR;  Service: Plastics;  Laterality: Right;    CARDIAC CATHETERIZATION  12/2012    CARDIAC DEFIBRILLATOR PLACEMENT Left     CRRT-D    CARDIAC VALVE REPLACEMENT  03/08/2018    LVAD Implant    COLONOSCOPY N/A 03/06/2018    Procedure: COLONOSCOPY;  Surgeon: Alonso Bone MD;  Location: [facility] ENDO (2ND FLR);  Service: Endoscopy;  Laterality: N/A;    COLONOSCOPY N/A 07/17/2019    Procedure: COLONOSCOPY;  Surgeon: Blane Valdez MD;  Location: [facility] ENDO (2ND FLR);  Service: Endoscopy;  Laterality: N/A;    COLONOSCOPY N/A 07/18/2019    Procedure: COLONOSCOPY;  Surgeon: Blane Valdez MD;  Location: [facility] ENDO (2ND FLR);  Service: Endoscopy;  Laterality: N/A;    CREATION OF ILIOFEMORAL ARTERY BYPASS Right 09/27/2022    Procedure: CREATION, BYPASS, ARTERIAL, ILIAC TO FEMORAL WITH GRAFT;  Surgeon: Zach Hernández MD;  Location: [facility] OR 2ND FLR;  Service: Peripheral Vascular;  Laterality: Right;    CREATION OF MUSCLE ROTATIONAL FLAP Right 09/27/2022    Procedure: CREATION, FLAP, MUSCLE ROTATION, SARTORIUS AND RECTUS FEMORIS;  Surgeon: Kole Tabares MD;  Location: [facility] OR 2ND FLR;  Service: Plastics;  Laterality: Right;    ESOPHAGOGASTRODUODENOSCOPY N/A 07/17/2019    Procedure: EGD (ESOPHAGOGASTRODUODENOSCOPY);  Surgeon: Blane Valdez MD;  Location: [facility] ENDO (2ND FLR);  Service: Endoscopy;  Laterality: N/A;    ESOPHAGOGASTRODUODENOSCOPY N/A 07/18/2019    Procedure: EGD (ESOPHAGOGASTRODUODENOSCOPY);  Surgeon: Blane Valdez MD;  Location: [facility] ENDO (2ND FLR);  Service: Endoscopy;  Laterality: N/A;    FOREIGN BODY REMOVAL N/A 07/22/2022    Procedure: REMOVAL, FOREIGN BODY;  Surgeon: Yg Kaufman,  MD;  Location: [facility] OR [facility] FLR;  Service: Cardiovascular;  Laterality: N/A;  LVAD Heartmate 2 drive line removal    INSERTION OF GRAFT TO PERICARDIUM N/A 07/15/2022    Procedure: INSERTION, GRAFT, PERICARDIUM;  Surgeon: Yg Kaufman MD;  [facility];  Service: Cardiovascular;  Laterality: N/A;    IRRIGATION OF MEDIASTINUM N/A 07/15/2022    Procedure: IRRIGATION, MEDIASTINUM;  Surgeon: Yg Kaufman MD;  [facility];  Service: Cardiovascular;  Laterality: N/A;    LYSIS OF ADHESIONS  07/13/2022    Procedure: LYSIS, ADHESIONS;  Surgeon: Yg Kaufman MD;  [facility];  Service: Cardiovascular;;    NONINVASIVE CARDIAC ELECTROPHYSIOLOGY STUDY N/A 10/18/2019    Procedure: CARDIAC ELECTROPHYSIOLOGY STUDY, NONINVASIVE;  Surgeon: Raz Wagner MD;  Location: [facility] EP LAB;  Service: Cardiology;  Laterality: N/A;  VT, DFTs, MDT CRTD in situ, LVAD, LASHELL pizarro, 3098    REPLACEMENT OF IMPLANTABLE CARDIOVERTER-DEFIBRILLATOR (ICD) GENERATOR N/A 03/09/2020    Procedure: REPLACEMENT, ICD GENERATOR;  Surgeon: Harry Yun MD;  Location: [facility] EP LAB;  Service: Cardiology;  Laterality: N/A;  VT, ICD Gen Change and Lead Revision, MDT, MAC, DM,3 Prep    REPLACEMENT OF LEFT VENTRICULAR ASSIST DEVICE (LVAD)  07/13/2022    Procedure: REPLACEMENT, LVAD;  Surgeon: Yg Kaufman MD;  [facility];  Service: Cardiovascular;;    REPLACEMENT OF PUMP N/A 07/13/2022    Procedure: REPLACEMENT, PUMP;  Surgeon: Yg Kaufman MD;  [facility];  Service: Cardiovascular;  Laterality: N/A;  LVAD pump exchange  EXPLANATION OF HEATMATE 2  IMPLANTATION OF HEARTMATE 3  IMPLANTATION OF 8MM CHIMNEY GRAFT TO RFA  INITIATION OF ECMO  TEMPORARY CLOSURE OF CHEST    REVISION OF IMPLANTABLE CARDIOVERTER-DEFIBRILLATOR (ICD) ELECTRODE LEAD PLACEMENT N/A 03/09/2020    Procedure: REVISION, INSERTION, ELECTRODE LEAD, ICD;  Surgeon: Harry Yun MD;  Location: [facility] EP LAB;  Service:  Cardiology;  Laterality: N/A;  VT, ICD Gen Change and Lead Revision, MDT, MAC, DM,3 Prep    RIGHT HEART CATHETERIZATION Right 09/08/2023    Procedure: INSERTION, CATHETER, RIGHT HEART;  Surgeon: Gaurav Rodriguez MD;  Location: [facility] CATH LAB;  Service: Cardiology;  Laterality: Right;    STERNAL WOUND CLOSURE N/A 07/14/2022    Procedure: CLOSURE, WOUND, STERNUM;  Surgeon: Yg Kaufman MD;  Location: [facility] OR [facility] FLR;  Service: Cardiovascular;  Laterality: N/A;  temporary closure  evacuation of hematoma    STERNAL WOUND CLOSURE N/A 07/15/2022    Procedure: CLOSURE, WOUND, STERNUM;  Surgeon: Yg Kaufman MD;  [facility];  Service: Cardiovascular;  Laterality: N/A;    TRACHEOSTOMY N/A 08/04/2022    Procedure: CREATION, TRACHEOSTOMY;  Surgeon: Germain Holt MD;  [facility];  Service: General;  Laterality: N/A;    TREATMENT OF CARDIAC ARRHYTHMIA  10/18/2019    Procedure: Cardioversion or Defibrillation;  Surgeon: Raz Wagner MD;  Location: [facility] EP LAB;  Service: Cardiology;;       Review of patient's allergies indicates:   Allergen Reactions    Lisinopril Anaphylaxis    Hydralazine     Hydralazine analogues      Chronic constipation, impotence, dizziness       Current Facility-Administered Medications   Medication    ALPRAZolam tablet 1 mg    [START ON 3/7/2025] amiodarone tablet 400 mg    [START ON 3/7/2025] amLODIPine tablet 10 mg    [START ON 3/7/2025] ferrous gluconate tablet 324 mg    [START ON 3/7/2025] levothyroxine tablet 125 mcg    mexiletine capsule 250 mg    mirtazapine tablet 30 mg    [START ON 3/7/2025] pantoprazole EC tablet 40 mg     Family History       Problem Relation (Age of Onset)    Cancer Sister (54)    Coronary artery disease Father    Diabetes Father    Heart disease Father    Hypertension Father    No Known Problems Mother, Brother          Tobacco Use    Smoking status: Former     Current packs/day: 0.00     Types: Vaping w/o nicotine, Cigarettes      Start date: 2018     Quit date: 2018     Years since quittin.1     Passive exposure: Never    Smokeless tobacco: Never    Tobacco comments:     pt is quiting on his own - pt stated not qualified for program;  pt  quit on his own   Substance and Sexual Activity    Alcohol use: Never     Comment: quit    Drug use: Never    Sexual activity: Not Currently     Partners: Female     Birth control/protection: None     Comment: 10/5/17  with same partner 7 years      Review of Systems   Constitutional:  Negative for activity change, appetite change and chills.   HENT:  Negative for congestion.    Eyes:  Positive for pain. Negative for visual disturbance.   Respiratory:  Negative for cough, chest tightness and shortness of breath.    Cardiovascular:  Negative for chest pain, palpitations and leg swelling.   Gastrointestinal:  Negative for abdominal pain.   Musculoskeletal:  Positive for joint swelling. Negative for neck pain and neck stiffness.   Neurological:  Positive for tremors. Negative for dizziness, seizures, syncope, weakness, light-headedness, numbness and headaches.     Objective:     Vital Signs (Most Recent):  Temp: 96.9 °F (36.1 °C) (25 1506)  Resp: 20 (25 1506)  BP: (!) 92/0 (25 1506)  SpO2: 95 % (25 1506) Vital Signs (24h Range):  Temp:  [96.9 °F (36.1 °C)] 96.9 °F (36.1 °C)  Resp:  [20] 20  SpO2:  [95 %] 95 %  BP: (92)/(0) 92/0     No data found.  Body mass index is 30.82 kg/m².    No intake or output data in the 24 hours ending 25 1602       Physical Exam     GEN: WDWN, in no acute distress.   HEENT: NCAT; EOMI; MMM; sclera clear without icterus or injection.  CV: Normal rate, regular rhythm, LVAD hum present   RESP: CTAB without rales, rhonchi, or wheezes. Nrml WOB on RA  GI: Soft, nontender, nondistended.  EXT: No peripheral edema, erythema, cyanosis. No unilateral limb swelling.  SKIN: No wounds, rashes, lesions, or ecchymosis on areas of exposed  "skin.  PSYCH: Awake, alert, oriented to person, place, and time; normal mood and affect.     Significant Labs:  CBC:  No results for input(s): "WBC", "RBC", "HGB", "HCT", "PLT", "MCV", "MCH", "MCHC" in the last 168 hours.  BNP:  No results for input(s): "BNP" in the last 168 hours.    Invalid input(s): "BNPTRIAGELBLO"  CMP:  No results for input(s): "GLU", "CALCIUM", "ALBUMIN", "PROT", "NA", "K", "CO2", "CL", "BUN", "CREATININE", "ALKPHOS", "ALT", "AST", "BILITOT" in the last 168 hours.   Coagulation:   Recent Labs   Lab 03/06/25  1004   INR 3.3*     LDH:  No results for input(s): "LDH" in the last 72 hours.  Microbiology:  Microbiology Results (last 7 days)       ** No results found for the last 168 hours. **            I have reviewed all pertinent labs within the past 24 hours.    Diagnostic Results:  I have reviewed all pertinent imaging results/findings within the past 24 hours.    Assessment/Plan:       Mr. Richards is a 60 yo black male with stage D HFrEF who was previously supported with a HM2 (implanted 3/8/2018 as DT-VAD) but required pump exchange (HM3 pump exchange 7/13/2022) for thrombosis of pump post COVID-19 PNA and AHRF. He presented as a direct admit from clinic after reaching out for detox, reports 1.5 year heavy etoh use and associated injuries, wishes to stop drinking and would like help to do so.       Fall  -associated with etoh intoxication has L orbit ecchymosis and R knee swelling, no neurological deficits on exam, no HA neck pain, changes in vision, able to bear weight on knee no joint instability     -nursing to do local wound care to superficial laceration to L temporal area   -CT head non contrast ordered   -R knee XR ordered     Anticoagulation monitoring, INR range 2-3  -INR supratheraputic 3.3 this morning in clinic, will hold warfarin for now  -daily INR     Adjustment disorder with mixed anxiety and depressed mood  -as need home xanax, holding for now as pt is on CIWA protocol "   -addiction psych as below   -continue home Remeron     History of ventricular tachycardia  Last ICD shock 4.5 months ago SICD implanted 9/14/2020.     -continuous telemetry   -replace electrolytes prn     amiodarone induced hypothyrodism  -continue home levothyroxine    LVAD (left ventricular assist device) present  Procedure: Device Interrogation Including analysis of device parameters  Current Settings: Ventricular Assist Device  Review of device function is stable/unstable stable        3/6/2025     3:29 PM 11/6/2024    10:02 AM 3/7/2024     2:17 PM 9/8/2023    10:19 AM 9/8/2023     8:29 AM 8/31/2023    11:48 AM 5/31/2023     2:27 PM   TXP LVAD INTERROGATIONS   Type HeartMate3   HeartMate3 HeartMate3     Flow 5.6   5.7 4.4     Speed 6300   6300 6300     PI 1.6   1.4 3.9     Power ([place]) 5.7   5.5 5.2     LSL 5900         Pulsatility  No Pulse No Pulse No Pulse  No Pulse Pulse         Hypertension  -continue home amlodipine    Chronic combined systolic and diastolic congestive heart failure  See LVAD     Alcohol abuse  Reports drinking appox 5 days/ week, usually starts drinking when it is dark outside occasionally starts earlier no earlier than 3pm. Drinks 1/3 of a liter bottle of liqour per day. Reports multiple injuries associated with etoh abuse. Expresses he wants to quit but needs help. Denies history of Dts or prior etoh withdrawal. He reports he has had hand tremors. Last drink was on Tuesday with associated fall hitting his L eye and R knee. Family history of alcoholism.     -consult placed for addiction psych   -[facility] protocol   -B12, folate ordered           Shivani Dickinson MD  Heart Transplant  [facility] - Cardiac [facility]

## 2025-03-06 NOTE — SUBJECTIVE & OBJECTIVE
Past Medical History:   Diagnosis Date    A-fib     Anticoagulant long-term use     Atrial flutter 7/30/2022    CHF (congestive heart failure)     Class 1 obesity due to excess calories with serious comorbidity and body mass index (BMI) of 31.0 to 31.9 in adult     Class 1 obesity due to excess calories with serious comorbidity and body mass index (BMI) of 32.0 to 32.9 in adult     Dilated cardiomyopathy 1/10/2018    Disorder of kidney and ureter     CKD    Encounter for blood transfusion     Essential hypertension 8/28/2022    Gout     HTN (hypertension)     Hx of psychiatric care     ICD (implantable cardioverter-defibrillator) infection 7/1/2020    Psychiatric problem     Thyroid disease     Ventricular tachycardia (paroxysmal)        Past Surgical History:   Procedure Laterality Date    AORTIC VALVULOPLASTY N/A 07/13/2022    Procedure: REPAIR, AORTIC VALVE;  Surgeon: Yg Kaufman MD;  Location: [facility] OR [address] [place] [address];  Service: Cardiovascular;  Laterality: N/A;    APPLICATION OF WOUND VACUUM-ASSISTED CLOSURE DEVICE N/A 07/15/2022    Procedure: APPLICATION, WOUND VAC;  Surgeon: gY Kaufman MD;  [facility];  Service: Cardiovascular;  Laterality: N/A;  50 x 5 cm     APPLICATION OF WOUND VACUUM-ASSISTED CLOSURE DEVICE Right 09/27/2022    Procedure: APPLICATION, WOUND VAC;  Surgeon: Koel Tabares MD;  Location: [facility] OR [address] [place] [address];  Service: Plastics;  Laterality: Right;    CARDIAC CATHETERIZATION  12/2012    CARDIAC DEFIBRILLATOR PLACEMENT Left     CRRT-D    CARDIAC VALVE REPLACEMENT  03/08/2018    LVAD Implant    COLONOSCOPY N/A 03/06/2018    Procedure: COLONOSCOPY;  Surgeon: Alonso Bone MD;  Location: [address]);  Service: Endoscopy;  Laterality: N/A;    COLONOSCOPY N/A 07/17/2019    Procedure: COLONOSCOPY;  Surgeon: lBane Valdez MD;  [facility]  Service: Endoscopy;  Laterality: N/A;    COLONOSCOPY N/A 07/18/2019    Procedure: COLONOSCOPY;  Surgeon: Blane Valdez MD;   Location: [facility] ENDO (2ND FLR);  Service: Endoscopy;  Laterality: N/A;    CREATION OF ILIOFEMORAL ARTERY BYPASS Right 09/27/2022    Procedure: CREATION, BYPASS, ARTERIAL, ILIAC TO FEMORAL WITH GRAFT;  Surgeon: Zach Hernández MD;  [facility];  Service: Peripheral Vascular;  Laterality: Right;    CREATION OF MUSCLE ROTATIONAL FLAP Right 09/27/2022    Procedure: CREATION, FLAP, MUSCLE ROTATION, SARTORIUS AND RECTUS FEMORIS;  Surgeon: Kole Tabares MD;  [facility];  Service: Plastics;  Laterality: Right;    ESOPHAGOGASTRODUODENOSCOPY N/A 07/17/2019    Procedure: EGD (ESOPHAGOGASTRODUODENOSCOPY);  Surgeon: Blane Valdez MD;  Location: [facility] ENDO (2ND FLR);  Service: Endoscopy;  Laterality: N/A;    ESOPHAGOGASTRODUODENOSCOPY N/A 07/18/2019    Procedure: EGD (ESOPHAGOGASTRODUODENOSCOPY);  Surgeon: Blane Valdez MD;  Location: [facility] ENDO (2ND FLR);  Service: Endoscopy;  Laterality: N/A;    FOREIGN BODY REMOVAL N/A 07/22/2022    Procedure: REMOVAL, FOREIGN BODY;  Surgeon: Yg Kaufman MD;  [facility];  Service: Cardiovascular;  Laterality: N/A;  LVAD Heartmate 2 drive line removal    INSERTION OF GRAFT TO PERICARDIUM N/A 07/15/2022    Procedure: INSERTION, GRAFT, PERICARDIUM;  Surgeon: Yg Kaufman MD;  [facility];  Service: Cardiovascular;  Laterality: N/A;    IRRIGATION OF MEDIASTINUM N/A 07/15/2022    Procedure: IRRIGATION, MEDIASTINUM;  Surgeon: Yg Kaufman MD;  [facility];  Service: Cardiovascular;  Laterality: N/A;    LYSIS OF ADHESIONS  07/13/2022    Procedure: LYSIS, ADHESIONS;  Surgeon: Yg Kaufman MD;  [facility];  Service: Cardiovascular;;    NONINVASIVE CARDIAC ELECTROPHYSIOLOGY STUDY N/A 10/18/2019    Procedure: CARDIAC ELECTROPHYSIOLOGY STUDY, NONINVASIVE;  Surgeon: Raz Wagner MD;  Location: [facility] EP LAB;  Service: Cardiology;  Laterality: N/A;  VT, DFTs, MDT CRTD in situ, LVAD, LASHELL pizarro, 3851     REPLACEMENT OF IMPLANTABLE CARDIOVERTER-DEFIBRILLATOR (ICD) GENERATOR N/A 03/09/2020    Procedure: REPLACEMENT, ICD GENERATOR;  Surgeon: Harry Yun MD;  Location: [facility] EP LAB;  Service: Cardiology;  Laterality: N/A;  VT, ICD Gen Change and Lead Revision, MDT, MAC, DM,3 Prep    REPLACEMENT OF LEFT VENTRICULAR ASSIST DEVICE (LVAD)  07/13/2022    Procedure: REPLACEMENT, LVAD;  Surgeon: Yg Kaufman MD;  [address]  Service: Cardiovascular;;    REPLACEMENT OF PUMP N/A 07/13/2022    Procedure: REPLACEMENT, PUMP;  Surgeon: Yg Kaufman MD;  [facility];  Service: Cardiovascular;  Laterality: N/A;  LVAD pump exchange  EXPLANATION OF HEATMATE 2  IMPLANTATION OF HEARTMATE 3  IMPLANTATION OF 8MM CHIMNEY GRAFT TO RFA  INITIATION OF ECMO  TEMPORARY CLOSURE OF CHEST    REVISION OF IMPLANTABLE CARDIOVERTER-DEFIBRILLATOR (ICD) ELECTRODE LEAD PLACEMENT N/A 03/09/2020    Procedure: REVISION, INSERTION, ELECTRODE LEAD, ICD;  Surgeon: Harry Yun MD;  Location: [facility] EP LAB;  Service: Cardiology;  Laterality: N/A;  VT, ICD Gen Change and Lead Revision, MDT, MAC, DM,3 Prep    RIGHT HEART CATHETERIZATION Right 09/08/2023    Procedure: INSERTION, CATHETER, RIGHT HEART;  Surgeon: Gaurav Rodriguez MD;  Location: [facility] CATH LAB;  Service: Cardiology;  Laterality: Right;    STERNAL WOUND CLOSURE N/A 07/14/2022    Procedure: CLOSURE, WOUND, STERNUM;  Surgeon: Yg Kaufman MD;  [address]  Service: Cardiovascular;  Laterality: N/A;  temporary closure  evacuation of hematoma    STERNAL WOUND CLOSURE N/A 07/15/2022    Procedure: CLOSURE, WOUND, STERNUM;  Surgeon: Yg Kaufman MD;  [facility];  Service: Cardiovascular;  Laterality: N/A;    TRACHEOSTOMY N/A 08/04/2022    Procedure: CREATION, TRACHEOSTOMY;  Surgeon: Germain Holt MD;  Location: [facility] OR [address] [place] [address];  Service: General;  Laterality: N/A;    TREATMENT OF CARDIAC ARRHYTHMIA  10/18/2019    Procedure:  Cardioversion or Defibrillation;  Surgeon: Raz Wagner MD;  Location: [facility] EP LAB;  Service: Cardiology;;       Review of patient's allergies indicates:   Allergen Reactions    Lisinopril Anaphylaxis    Hydralazine     Hydralazine analogues      Chronic constipation, impotence, dizziness       Current Facility-Administered Medications   Medication    ALPRAZolam tablet 1 mg    [START ON 3/7/2025] amiodarone tablet 400 mg    [START ON 3/7/2025] amLODIPine tablet 10 mg    [START ON 3/7/2025] ferrous gluconate tablet 324 mg    [START ON 3/7/2025] levothyroxine tablet 125 mcg    mexiletine capsule 250 mg    mirtazapine tablet 30 mg    [START ON 3/7/2025] pantoprazole EC tablet 40 mg     Family History       Problem Relation (Age of Onset)    Cancer Sister (54)    Coronary artery disease Father    Diabetes Father    Heart disease Father    Hypertension Father    No Known Problems Mother, Brother          Tobacco Use    Smoking status: Former     Current packs/day: 0.00     Types: Vaping w/o nicotine, Cigarettes     Start date: 2018     Quit date: 2018     Years since quittin.1     Passive exposure: Never    Smokeless tobacco: Never    Tobacco comments:     pt is quiting on his own - pt stated not qualified for program;  pt  quit on his own   Substance and Sexual Activity    Alcohol use: Never     Comment: quit    Drug use: Never    Sexual activity: Not Currently     Partners: Female     Birth control/protection: None     Comment: 10/5/17  with same partner 7 years      Review of Systems   Constitutional:  Negative for activity change, appetite change and chills.   HENT:  Negative for congestion.    Eyes:  Positive for pain. Negative for visual disturbance.   Respiratory:  Negative for cough, chest tightness and shortness of breath.    Cardiovascular:  Negative for chest pain, palpitations and leg swelling.   Gastrointestinal:  Negative for abdominal pain.   Musculoskeletal:  Positive for  "joint swelling. Negative for neck pain and neck stiffness.   Neurological:  Positive for tremors. Negative for dizziness, seizures, syncope, weakness, light-headedness, numbness and headaches.     Objective:     Vital Signs (Most Recent):  Temp: 96.9 °F (36.1 °C) (03/06/25 1506)  Resp: 20 (03/06/25 1506)  BP: (!) 92/0 (03/06/25 1506)  SpO2: 95 % (03/06/25 1506) Vital Signs (24h Range):  Temp:  [96.9 °F (36.1 °C)] 96.9 °F (36.1 °C)  Resp:  [20] 20  SpO2:  [95 %] 95 %  BP: (92)/(0) 92/0     No data found.  Body mass index is 30.82 kg/m².    No intake or output data in the 24 hours ending 03/06/25 1602       Physical Exam     GEN: WDWN, in no acute distress.   HEENT: NCAT; EOMI; MMM; sclera clear without icterus or injection.  CV: Normal rate, regular rhythm, LVAD hum present   RESP: CTAB without rales, rhonchi, or wheezes. Nrml WOB on RA  GI: Soft, nontender, nondistended.  EXT: No peripheral edema, erythema, cyanosis. No unilateral limb swelling.  SKIN: No wounds, rashes, lesions, or ecchymosis on areas of exposed skin.  PSYCH: Awake, alert, oriented to person, place, and time; normal mood and affect.     Significant Labs:  CBC:  No results for input(s): "WBC", "RBC", "HGB", "HCT", "PLT", "MCV", "MCH", "MCHC" in the last 168 hours.  BNP:  No results for input(s): "BNP" in the last 168 hours.    Invalid input(s): "BNPTRIAGELBLO"  CMP:  No results for input(s): "GLU", "CALCIUM", "ALBUMIN", "PROT", "NA", "K", "CO2", "CL", "BUN", "CREATININE", "ALKPHOS", "ALT", "AST", "BILITOT" in the last 168 hours.   Coagulation:   Recent Labs   Lab 03/06/25  1004   INR 3.3*     LDH:  No results for input(s): "LDH" in the last 72 hours.  Microbiology:  Microbiology Results (last 7 days)       ** No results found for the last 168 hours. **            I have reviewed all pertinent labs within the past 24 hours.    Diagnostic Results:  I have reviewed all pertinent imaging results/findings within the past 24 hours.    "

## 2025-03-06 NOTE — ASSESSMENT & PLAN NOTE
Reports drinking appox 5 days/ week, usually starts drinking when it is dark outside occasionally starts earlier no earlier than 3pm. Drinks 1/3 of a liter bottle of liqour per day. Reports multiple injuries associated with etoh abuse. Expresses he wants to quit but needs help. Denies history of Dts or prior etoh withdrawal. He reports he has had hand tremors. Last drink was on Tuesday with associated fall hitting his L eye and R knee. Family history of alcoholism.     -consult placed for addiction psych   -[facility] protocol   -B12, folate ordered

## 2025-03-06 NOTE — ASSESSMENT & PLAN NOTE
- Now with Amiodarone-induced hypothyroidism   - Labs : check TSH and free T4   - Clinically and biochemically hypothyroid   - Goal is a normal TSH   - Avoid exogenous hyperthyroidism as this can accelerate bone loss and increase risk of CV complications.     Medication Changes: continue Levothyroxine 125 mcg daily for now pending lab results   - If TSH elevated, will increase LT4 dose     - Advised to take LT4 on an empty stomach with water and to wait 30-45 minutes before eating or taking other medications    - Calcium and iron need to be  from thyroid hormone replacement by 4 or > hours.   - Please note: if you are taking biotin please hold it for 5 days prior to labs as it can interfere with the thyroid testing.   - Reviewed usual times of thyroid hormone changes   - Reviewed that symptoms of hypothyroidism may not correlate with tsh, and a normal TSH is the goal of therapy. Symptoms are not a justification for over treatment.

## 2025-03-07 PROBLEM — F41.8 DEPRESSION WITH ANXIETY: Chronic | Status: ACTIVE | Noted: 2022-08-13

## 2025-03-07 PROBLEM — F10.930 ALCOHOL WITHDRAWAL, UNCOMPLICATED: Status: ACTIVE | Noted: 2025-03-07

## 2025-03-07 LAB
ALBUMIN SERPL BCP-MCNC: 3.6 G/DL (ref 3.5–5.2)
ALP SERPL-CCNC: 97 U/L (ref 40–150)
ALT SERPL W/O P-5'-P-CCNC: 162 U/L (ref 10–44)
ANION GAP SERPL CALC-SCNC: 10 MMOL/L (ref 8–16)
APTT PPP: 52.9 SEC (ref 21–32)
AST SERPL-CCNC: 247 U/L (ref 10–40)
BASOPHILS # BLD AUTO: 0.02 K/UL (ref 0–0.2)
BASOPHILS NFR BLD: 0.5 % (ref 0–1.9)
BILIRUB DIRECT SERPL-MCNC: 0.3 MG/DL (ref 0.1–0.3)
BILIRUB SERPL-MCNC: 0.6 MG/DL (ref 0.1–1)
BNP SERPL-MCNC: 66 PG/ML (ref 0–99)
BUN SERPL-MCNC: 30 MG/DL (ref 6–20)
CALCIUM SERPL-MCNC: 9.5 MG/DL (ref 8.7–10.5)
CHLORIDE SERPL-SCNC: 99 MMOL/L (ref 95–110)
CO2 SERPL-SCNC: 27 MMOL/L (ref 23–29)
CREAT SERPL-MCNC: 2.7 MG/DL (ref 0.5–1.4)
CRP SERPL-MCNC: 63.5 MG/L (ref 0–8.2)
DIFFERENTIAL METHOD BLD: ABNORMAL
EOSINOPHIL # BLD AUTO: 0.1 K/UL (ref 0–0.5)
EOSINOPHIL NFR BLD: 3.7 % (ref 0–8)
ERYTHROCYTE [DISTWIDTH] IN BLOOD BY AUTOMATED COUNT: 14.2 % (ref 11.5–14.5)
EST. GFR  (NO RACE VARIABLE): 26.5 ML/MIN/1.73 M^2
GLUCOSE SERPL-MCNC: 84 MG/DL (ref 70–110)
GRAM STN SPEC: NORMAL
GRAM STN SPEC: NORMAL
HCT VFR BLD AUTO: 44 % (ref 40–54)
HGB BLD-MCNC: 13.9 G/DL (ref 14–18)
IMM GRANULOCYTES # BLD AUTO: 0.01 K/UL (ref 0–0.04)
IMM GRANULOCYTES NFR BLD AUTO: 0.3 % (ref 0–0.5)
INR PPP: 2.5 (ref 0.8–1.2)
LDH SERPL L TO P-CCNC: 199 U/L (ref 110–260)
LYMPHOCYTES # BLD AUTO: 1.2 K/UL (ref 1–4.8)
LYMPHOCYTES NFR BLD: 31.1 % (ref 18–48)
MAGNESIUM SERPL-MCNC: 2.2 MG/DL (ref 1.6–2.6)
MCH RBC QN AUTO: 29.3 PG (ref 27–31)
MCHC RBC AUTO-ENTMCNC: 31.6 G/DL (ref 32–36)
MCV RBC AUTO: 93 FL (ref 82–98)
MONOCYTES # BLD AUTO: 0.7 K/UL (ref 0.3–1)
MONOCYTES NFR BLD: 18.5 % (ref 4–15)
NEUTROPHILS # BLD AUTO: 1.8 K/UL (ref 1.8–7.7)
NEUTROPHILS NFR BLD: 45.9 % (ref 38–73)
NRBC BLD-RTO: 0 /100 WBC
PHOSPHATE SERPL-MCNC: 2.4 MG/DL (ref 2.7–4.5)
PLATELET # BLD AUTO: 182 K/UL (ref 150–450)
PMV BLD AUTO: 10.3 FL (ref 9.2–12.9)
POTASSIUM SERPL-SCNC: 3.6 MMOL/L (ref 3.5–5.1)
PREALB SERPL-MCNC: 20 MG/DL (ref 20–43)
PROT SERPL-MCNC: 8 G/DL (ref 6–8.4)
PROTHROMBIN TIME: 26.1 SEC (ref 9–12.5)
RBC # BLD AUTO: 4.74 M/UL (ref 4.6–6.2)
SODIUM SERPL-SCNC: 136 MMOL/L (ref 136–145)
WBC # BLD AUTO: 3.83 K/UL (ref 3.9–12.7)

## 2025-03-07 PROCEDURE — 84100 ASSAY OF PHOSPHORUS: CPT

## 2025-03-07 PROCEDURE — 99233 SBSQ HOSP IP/OBS HIGH 50: CPT | Mod: GC,,, | Performed by: PHYSICIAN ASSISTANT

## 2025-03-07 PROCEDURE — 25000003 PHARM REV CODE 250: Performed by: PHYSICIAN ASSISTANT

## 2025-03-07 PROCEDURE — 25000003 PHARM REV CODE 250: Performed by: STUDENT IN AN ORGANIZED HEALTH CARE EDUCATION/TRAINING PROGRAM

## 2025-03-07 PROCEDURE — 85025 COMPLETE CBC W/AUTO DIFF WBC: CPT

## 2025-03-07 PROCEDURE — 87205 SMEAR GRAM STAIN: CPT | Performed by: INTERNAL MEDICINE

## 2025-03-07 PROCEDURE — 83880 ASSAY OF NATRIURETIC PEPTIDE: CPT

## 2025-03-07 PROCEDURE — 97165 OT EVAL LOW COMPLEX 30 MIN: CPT

## 2025-03-07 PROCEDURE — 97161 PT EVAL LOW COMPLEX 20 MIN: CPT

## 2025-03-07 PROCEDURE — 83735 ASSAY OF MAGNESIUM: CPT

## 2025-03-07 PROCEDURE — 87116 MYCOBACTERIA CULTURE: CPT | Performed by: INTERNAL MEDICINE

## 2025-03-07 PROCEDURE — 80076 HEPATIC FUNCTION PANEL: CPT

## 2025-03-07 PROCEDURE — 86140 C-REACTIVE PROTEIN: CPT

## 2025-03-07 PROCEDURE — 36415 COLL VENOUS BLD VENIPUNCTURE: CPT

## 2025-03-07 PROCEDURE — 87070 CULTURE OTHR SPECIMN AEROBIC: CPT | Performed by: INTERNAL MEDICINE

## 2025-03-07 PROCEDURE — 87206 SMEAR FLUORESCENT/ACID STAI: CPT | Performed by: INTERNAL MEDICINE

## 2025-03-07 PROCEDURE — 25000003 PHARM REV CODE 250

## 2025-03-07 PROCEDURE — 97535 SELF CARE MNGMENT TRAINING: CPT

## 2025-03-07 PROCEDURE — 27000248 HC VAD-ADDITIONAL DAY

## 2025-03-07 PROCEDURE — 97116 GAIT TRAINING THERAPY: CPT

## 2025-03-07 PROCEDURE — 25000003 PHARM REV CODE 250: Performed by: INTERNAL MEDICINE

## 2025-03-07 PROCEDURE — 83615 LACTATE (LD) (LDH) ENZYME: CPT

## 2025-03-07 PROCEDURE — 84134 ASSAY OF PREALBUMIN: CPT

## 2025-03-07 PROCEDURE — 80048 BASIC METABOLIC PNL TOTAL CA: CPT

## 2025-03-07 PROCEDURE — 94761 N-INVAS EAR/PLS OXIMETRY MLT: CPT

## 2025-03-07 PROCEDURE — 85610 PROTHROMBIN TIME: CPT

## 2025-03-07 PROCEDURE — 85730 THROMBOPLASTIN TIME PARTIAL: CPT

## 2025-03-07 PROCEDURE — 20600001 HC STEP DOWN PRIVATE ROOM

## 2025-03-07 PROCEDURE — 93750 INTERROGATION VAD IN PERSON: CPT | Mod: ,,, | Performed by: INTERNAL MEDICINE

## 2025-03-07 PROCEDURE — 87075 CULTR BACTERIA EXCEPT BLOOD: CPT | Performed by: INTERNAL MEDICINE

## 2025-03-07 PROCEDURE — 99222 1ST HOSP IP/OBS MODERATE 55: CPT | Mod: ,,, | Performed by: PSYCHIATRY & NEUROLOGY

## 2025-03-07 PROCEDURE — 87102 FUNGUS ISOLATION CULTURE: CPT | Performed by: INTERNAL MEDICINE

## 2025-03-07 RX ORDER — WARFARIN SODIUM 5 MG/1
5 TABLET ORAL DAILY
Status: DISCONTINUED | OUTPATIENT
Start: 2025-03-07 | End: 2025-03-08 | Stop reason: HOSPADM

## 2025-03-07 RX ORDER — POLYETHYLENE GLYCOL 3350 17 G/17G
17 POWDER, FOR SOLUTION ORAL DAILY
Status: DISCONTINUED | OUTPATIENT
Start: 2025-03-07 | End: 2025-03-08 | Stop reason: HOSPADM

## 2025-03-07 RX ORDER — AMOXICILLIN 250 MG
1 CAPSULE ORAL DAILY PRN
Status: DISCONTINUED | OUTPATIENT
Start: 2025-03-07 | End: 2025-03-08 | Stop reason: HOSPADM

## 2025-03-07 RX ORDER — ALPRAZOLAM 0.5 MG/1
1 TABLET ORAL 2 TIMES DAILY PRN
Status: DISCONTINUED | OUTPATIENT
Start: 2025-03-07 | End: 2025-03-08 | Stop reason: HOSPADM

## 2025-03-07 RX ORDER — ACETAMINOPHEN 325 MG/1
650 TABLET ORAL EVERY 6 HOURS PRN
Status: DISCONTINUED | OUTPATIENT
Start: 2025-03-07 | End: 2025-03-08 | Stop reason: HOSPADM

## 2025-03-07 RX ADMIN — OMEGA-3-ACID ETHYL ESTERS 2 G: 1 CAPSULE, LIQUID FILLED ORAL at 08:03

## 2025-03-07 RX ADMIN — ACETAMINOPHEN 650 MG: 325 TABLET ORAL at 04:03

## 2025-03-07 RX ADMIN — WARFARIN SODIUM 5 MG: 5 TABLET ORAL at 04:03

## 2025-03-07 RX ADMIN — MEXILETINE HYDROCHLORIDE 250 MG: 250 CAPSULE ORAL at 06:03

## 2025-03-07 RX ADMIN — POLYETHYLENE GLYCOL 3350 17 G: 17 POWDER, FOR SOLUTION ORAL at 08:03

## 2025-03-07 RX ADMIN — AMIODARONE HYDROCHLORIDE 400 MG: 200 TABLET ORAL at 08:03

## 2025-03-07 RX ADMIN — LEVOTHYROXINE SODIUM 125 MCG: 25 TABLET ORAL at 06:03

## 2025-03-07 RX ADMIN — MIRTAZAPINE 30 MG: 15 TABLET, FILM COATED ORAL at 08:03

## 2025-03-07 RX ADMIN — AMLODIPINE BESYLATE 10 MG: 10 TABLET ORAL at 08:03

## 2025-03-07 RX ADMIN — PANTOPRAZOLE SODIUM 40 MG: 40 TABLET, DELAYED RELEASE ORAL at 11:03

## 2025-03-07 RX ADMIN — MEXILETINE HYDROCHLORIDE 250 MG: 250 CAPSULE ORAL at 02:03

## 2025-03-07 RX ADMIN — Medication 324 MG: at 04:03

## 2025-03-07 RX ADMIN — ALPRAZOLAM 1 MG: 0.5 TABLET ORAL at 04:03

## 2025-03-07 RX ADMIN — Medication 324 MG: at 08:03

## 2025-03-07 RX ADMIN — MEXILETINE HYDROCHLORIDE 250 MG: 250 CAPSULE ORAL at 09:03

## 2025-03-07 NOTE — PT/OT/SLP EVAL
Occupational Therapy   Co- Evaluation and Discharge Note    Name: Tim Richards  MRN: [id number]  Admitting Diagnosis: <principal problem not specified>  Recent Surgery: * No surgery found *      Recommendations:     Discharge Recommendations: No Therapy Indicated  Discharge Equipment Recommendations: none   Barriers to discharge: None    Assessment:     Tim Richards is a 59 y.o. male with a medical diagnosis of <principal problem not specified>. At this time, patient is functioning at their prior level of function and does not require further acute OT services.     Plan:     During this hospitalization, patient does not require further acute OT services.  Please re-consult if situation changes.    Plan of Care Reviewed with: Patient     Subjective     Chief Complaint: R Knee pain   Patient/Family Comments/goals: Pt.reports is always dizzy since the LVAD    Occupational Profile:  Living Environment: Pt lives with spouse in 2 story split level house with 7 SALVADOR and jaleel HR (wide). Bathroom: walk-in shower. Pt bed and bath located on 2nd floor with ~12 stairs and L HR to access   Previous level of function: ind in all ADLs and fxl mob   Roles and Routines: Drivers   Equipment Used at home: SPC, shower chair, RW, SW   Assistance upon Discharge: Spouse    Pain/Comfort:  Pain Rating 1: 0/10  Location - Side 1: Right  Location - Orientation 1: generalized  Location 1: knee  Pain Addressed 1: Reposition, Distraction  Pain Rating Post-Intervention 1:  (Pt did not rate)    Patients cultural, spiritual, Yazdanism conflicts given the current situation: no      Objective:     Communicated with: Nurse prior to session.  Patient found HOB elevated with telemetry, LVAD upon OT entry to room.    General Precautions: Standard, fall    Orthopedic Precautions:N/A   Braces: N/A  Respiratory Status: Room air    Occupational Performance:    Bed Mobility:    Patient completed Supine to Sit with independence    Functional  Mobility/Transfers:  Patient completed Sit <> Stand Transfer with independence  with  no assistive device   Functional Mobility: Pt demonstrated functional mobility training to simulate household to/ from doorway and into the hallway  with no AD with supervision  and no LOB and good visual search and navigation strategies.     Activities of Daily Living:  Ind with all components of LVAD       Cognitive/Visual Perceptual:  Cognitive/Psychosocial Skills:     -       Oriented to: Person, Place, Time, and Situation   -       Follows Commands/attention:Follows multistep  commands  -       Communication: clear/fluent  -       Memory: No Deficits noted  -       Safety awareness/insight to disability: intact   -       Mood/Affect/Coping skills/emotional control: Appropriate to situation    Physical Exam:  Upper Extremity Range of Motion:     -       Right Upper Extremity: WFL  -       Left Upper Extremity: WFL  Upper Extremity Strength: -       Right Upper Extremity: WFL  -       Left Upper Extremity: WFL   Strength:    -       Right Upper Extremity: WFL  -       Left Upper Extremity: WFL    AMPAC 6 Click ADL:  AMPAC Total Score: 24    Treatment & Education:  Pt educated on role of occupational therapy, POC, and safety during ADLs and functional mobility. Pt and OT discussed importance of safe, continued mobility to optimize daily living skills. Pt verbalized understanding. Pt given instruction to call for medical staff/nurse for assistance.   PT present for coeval due to pt's multiple medical comorbidities and functional/cognition deficits requiring two skilled therapists to appropriately progress pt's musculoskeletal strength, neuromuscular control, and endurance while taking into consideration medical acuity and pt safety.    Patient left sitting edge of bed with all lines intact, call button in reach, and nurse notified    GOALS:   Multidisciplinary Problems       Occupational Therapy Goals       Not on file               Multidisciplinary Problems (Resolved)          Problem: Occupational Therapy    Goal Priority Disciplines Outcome Interventions   Occupational Therapy Goal   (Resolved)     OT, PT/OT Met                        DME Justifications:  No DME recommended requiring DME justifications    History:     Past Medical History:   Diagnosis Date    A-fib     Anticoagulant long-term use     Atrial flutter 7/30/2022    CHF (congestive heart failure)     Class 1 obesity due to excess calories with serious comorbidity and body mass index (BMI) of 31.0 to 31.9 in adult     Class 1 obesity due to excess calories with serious comorbidity and body mass index (BMI) of 32.0 to 32.9 in adult     Dilated cardiomyopathy 1/10/2018    Disorder of kidney and ureter     CKD    Encounter for blood transfusion     Essential hypertension 8/28/2022    Gout     HTN (hypertension)     Hx of psychiatric care     ICD (implantable cardioverter-defibrillator) infection 7/1/2020    Psychiatric problem     Thyroid disease     Ventricular tachycardia (paroxysmal)          Past Surgical History:   Procedure Laterality Date    AORTIC VALVULOPLASTY N/A 07/13/2022    Procedure: REPAIR, AORTIC VALVE;  Surgeon: Yg Kaufman MD;  Location: [facility] OR [address] [place] [address];  Service: Cardiovascular;  Laterality: N/A;    APPLICATION OF WOUND VACUUM-ASSISTED CLOSURE DEVICE N/A 07/15/2022    Procedure: APPLICATION, WOUND VAC;  Surgeon: Yg Kaufman MD;  Location: [facility] OR [address] [place] [address];  Service: Cardiovascular;  Laterality: N/A;  50 x 5 cm     APPLICATION OF WOUND VACUUM-ASSISTED CLOSURE DEVICE Right 09/27/2022    Procedure: APPLICATION, WOUND VAC;  Surgeon: Kole Tabares MD;  Location: [facility] OR [address] [place] [address];  Service: Plastics;  Laterality: Right;    CARDIAC CATHETERIZATION  12/2012    CARDIAC DEFIBRILLATOR PLACEMENT Left     CRRT-D    CARDIAC VALVE REPLACEMENT  03/08/2018    LVAD Implant    COLONOSCOPY N/A 03/06/2018    Procedure: COLONOSCOPY;  Surgeon: Alonso Bone MD;   Location: [facility] ENDO (2ND FLR);  Service: Endoscopy;  Laterality: N/A;    COLONOSCOPY N/A 07/17/2019    Procedure: COLONOSCOPY;  Surgeon: Blane Valdez MD;  Location: [facility] ENDO (2ND FLR);  Service: Endoscopy;  Laterality: N/A;    COLONOSCOPY N/A 07/18/2019    Procedure: COLONOSCOPY;  Surgeon: Blane Valdez MD;  Location: [facility] ENDO (2ND FLR);  Service: Endoscopy;  Laterality: N/A;    CREATION OF ILIOFEMORAL ARTERY BYPASS Right 09/27/2022    Procedure: CREATION, BYPASS, ARTERIAL, ILIAC TO FEMORAL WITH GRAFT;  Surgeon: Zach Hernández MD;  Location: [facility] OR 2ND FLR;  Service: Peripheral Vascular;  Laterality: Right;    CREATION OF MUSCLE ROTATIONAL FLAP Right 09/27/2022    Procedure: CREATION, FLAP, MUSCLE ROTATION, SARTORIUS AND RECTUS FEMORIS;  Surgeon: Kole Tabares MD;  [facility];  Service: Plastics;  Laterality: Right;    ESOPHAGOGASTRODUODENOSCOPY N/A 07/17/2019    Procedure: EGD (ESOPHAGOGASTRODUODENOSCOPY);  Surgeon: Blane Valdez MD;  Location: [facility] ENDO (2ND FLR);  Service: Endoscopy;  Laterality: N/A;    ESOPHAGOGASTRODUODENOSCOPY N/A 07/18/2019    Procedure: EGD (ESOPHAGOGASTRODUODENOSCOPY);  Surgeon: Blane Valdez MD;  Location: [facility] ENDO (2ND FLR);  Service: Endoscopy;  Laterality: N/A;    FOREIGN BODY REMOVAL N/A 07/22/2022    Procedure: REMOVAL, FOREIGN BODY;  Surgeon: Yg Kaufman MD;  Location: [facility] OR [facility] FLR;  Service: Cardiovascular;  Laterality: N/A;  LVAD Heartmate 2 drive line removal    INSERTION OF GRAFT TO PERICARDIUM N/A 07/15/2022    Procedure: INSERTION, GRAFT, PERICARDIUM;  Surgeon: Yg Kaufman MD;  [facility];  Service: Cardiovascular;  Laterality: N/A;    IRRIGATION OF MEDIASTINUM N/A 07/15/2022    Procedure: IRRIGATION, MEDIASTINUM;  Surgeon: Yg Kaufman MD;  [facility];  Service: Cardiovascular;  Laterality: N/A;    LYSIS OF ADHESIONS  07/13/2022    Procedure: LYSIS, ADHESIONS;  Surgeon: Yg Kaufman MD;  Location: [facility] OR [facility];  Service: Cardiovascular;;    NONINVASIVE CARDIAC ELECTROPHYSIOLOGY STUDY N/A 10/18/2019    Procedure: CARDIAC ELECTROPHYSIOLOGY STUDY, NONINVASIVE;  Surgeon: Raz Wagner MD;  Location: [facility] EP LAB;  Service: Cardiology;  Laterality: N/A;  VT, DFTs, MDT CRTD in situ, LVAD, juarez, MB, 3098    REPLACEMENT OF IMPLANTABLE CARDIOVERTER-DEFIBRILLATOR (ICD) GENERATOR N/A 03/09/2020    Procedure: REPLACEMENT, ICD GENERATOR;  Surgeon: Harry Yun MD;  Location: [facility] EP LAB;  Service: Cardiology;  Laterality: N/A;  VT, ICD Gen Change and Lead Revision, MDT, MAC, DM,3 Prep    REPLACEMENT OF LEFT VENTRICULAR ASSIST DEVICE (LVAD)  07/13/2022    Procedure: REPLACEMENT, LVAD;  Surgeon: Yg Kaufman MD;  [facility];  Service: Cardiovascular;;    REPLACEMENT OF PUMP N/A 07/13/2022    Procedure: REPLACEMENT, PUMP;  Surgeon: Yg Kaufman MD;  [facility];  Service: Cardiovascular;  Laterality: N/A;  LVAD pump exchange  EXPLANATION OF HEATMATE 2  IMPLANTATION OF HEARTMATE 3  IMPLANTATION OF 8MM CHIMNEY GRAFT TO RFA  INITIATION OF ECMO  TEMPORARY CLOSURE OF CHEST    REVISION OF IMPLANTABLE CARDIOVERTER-DEFIBRILLATOR (ICD) ELECTRODE LEAD PLACEMENT N/A 03/09/2020    Procedure: REVISION, INSERTION, ELECTRODE LEAD, ICD;  Surgeon: Harry Yun MD;  Location: [facility] EP LAB;  Service: Cardiology;  Laterality: N/A;  VT, ICD Gen Change and Lead Revision, MDT, MAC, DM,3 Prep    RIGHT HEART CATHETERIZATION Right 09/08/2023    Procedure: INSERTION, CATHETER, RIGHT HEART;  Surgeon: Gaurav Rodriguez MD;  Location: [facility] CATH LAB;  Service: Cardiology;  Laterality: Right;    STERNAL WOUND CLOSURE N/A 07/14/2022    Procedure: CLOSURE, WOUND, STERNUM;  Surgeon: Yg Kaufman MD;  [facility];  Service: Cardiovascular;  Laterality: N/A;  temporary closure  evacuation of hematoma    STERNAL WOUND CLOSURE N/A 07/15/2022    Procedure: CLOSURE, WOUND, STERNUM;  Surgeon: Yg Kaufman MD;   [facility];  Service: Cardiovascular;  Laterality: N/A;    TRACHEOSTOMY N/A 08/04/2022    Procedure: CREATION, TRACHEOSTOMY;  Surgeon: Germain Holt MD;  [facility];  Service: General;  Laterality: N/A;    TREATMENT OF CARDIAC ARRHYTHMIA  10/18/2019    Procedure: Cardioversion or Defibrillation;  Surgeon: Raz Wagner MD;  Location: [facility] EP LAB;  Service: Cardiology;;       Time Tracking:     OT Date of Treatment: 03/07/25  OT Start Time: 1041  OT Stop Time: 1057  OT Total Time (min): 16 min    Billable Minutes:Evaluation 8  Therapeutic Activity 8    3/7/2025

## 2025-03-07 NOTE — PROGRESS NOTES
[facility]  Heart Transplant  Progress Note    Patient Name: Tim Richards  MRN: [id number]  Admission Date: 3/6/2025  Hospital Length of Stay: 1 days  Attending Physician: Gaurav Rodriguez MD  Primary Care Provider: Diego Daniel MD  Principal Problem:<principal problem not specified>    Subjective:   Interval History: No events overnight. Patient denies complaints. CTH and R knee radiographs with evidence of soft tissue bruising from his fall earlier this week but no fractures or intracranial process. Other than very fine tremor in hands no other symptoms or evidence of withdrawals.    Continuous Infusions:  Scheduled Meds:   amiodarone  400 mg Oral Daily    amLODIPine  10 mg Oral Daily    ferrous gluconate  324 mg Oral BID WM    levothyroxine  125 mcg Oral Before breakfast    mexiletine  250 mg Oral Q8H    mirtazapine  30 mg Oral QHS    mupirocin   Nasal BID    omega-3 acid ethyl esters  2 g Oral BID    pantoprazole  40 mg Oral Daily with lunch    polyethylene glycol  17 g Oral Daily    warfarin  5 mg Oral Daily     PRN Meds:  Current Facility-Administered Medications:     lorazepam, 2 mg, Intravenous, Q2H PRN    LORazepam, 2 mg, Oral, Q4H PRN    senna-docusate 8.6-50 mg, 1 tablet, Oral, Daily PRN    Review of patient's allergies indicates:   Allergen Reactions    Lisinopril Anaphylaxis    Hydralazine     Hydralazine analogues      Chronic constipation, impotence, dizziness     Objective:     Vital Signs (Most Recent):  Temp: 98.3 °F (36.8 °C) (03/07/25 0711)  Pulse: 62 (03/07/25 1034)  Resp: 18 (03/07/25 0711)  BP: (!) 84/0 (03/07/25 0715)  SpO2: (!) 94 % (03/07/25 0711) Vital Signs (24h Range):  Temp:  [96.9 °F (36.1 °C)-98.5 °F (36.9 °C)] 98.3 °F (36.8 °C)  Pulse:  [30-72] 62  Resp:  [18-20] 18  SpO2:  [94 %-98 %] 94 %  BP: (78-92)/(0) 84/0     Patient Vitals for the past 72 hrs (Last 3 readings):   Weight   03/06/25 1506 106 kg (233 lb 9.2 oz)     Body mass index is 30.82  kg/m².      Intake/Output Summary (Last 24 hours) at 3/7/2025 1125  Last data filed at 3/7/2025 0805  Gross per 24 hour   Intake 851 ml   Output 325 ml   Net 526 ml          Physical Exam  Constitutional:       Appearance: Normal appearance.   HENT:      Head: Normocephalic and atraumatic.   Neck:      Vascular: No JVD.   Cardiovascular:      Rate and Rhythm: Normal rate and regular rhythm.      Pulses: Normal pulses.      Heart sounds: Normal heart sounds.      Comments: Smooth LVAD hum.   Pulmonary:      Effort: Pulmonary effort is normal.      Breath sounds: Normal breath sounds.   Abdominal:      General: Abdomen is flat. There is no distension.      Palpations: Abdomen is soft.      Tenderness: There is no abdominal tenderness.   Musculoskeletal:      Cervical back: Normal range of motion and neck supple.      Right lower leg: No edema.      Left lower leg: No edema.   Skin:     General: Skin is warm and dry.   Neurological:      General: No focal deficit present.      Mental Status: He is alert and oriented to person, place, and time.   Psychiatric:         Mood and Affect: Mood normal.         Behavior: Behavior normal.            Significant Labs:  CBC:  Recent Labs   Lab 03/06/25 1613 03/07/25 0459   WBC 5.48 3.83*   RBC 4.72 4.74   HGB 14.0 13.9*   HCT 43.9 44.0    182   MCV 93 93   MCH 29.7 29.3   MCHC 31.9* 31.6*     BNP:  Recent Labs   Lab 03/07/25  0459   BNP 66     CMP:  Recent Labs   Lab 03/06/25 1613 03/07/25 0459   GLU 82 84   CALCIUM 9.9 9.5   ALBUMIN  --  3.6   PROT  --  8.0    136   K 3.9 3.6   CO2 25 27   CL 98 99   BUN 27* 30*   CREATININE 2.9* 2.7*   ALKPHOS  --  97   ALT  --  162*   AST  --  247*   BILITOT  --  0.6      Coagulation:   Recent Labs   Lab 03/06/25  1004 03/06/25 1613 03/07/25 0459   INR 3.3* 4.1* 2.5*   APTT  --  55.5* 52.9*     LDH:  Recent Labs   Lab 03/06/25 1613 03/07/25  0459   * 199     Microbiology:  Microbiology Results (last 7 days)       **  No results found for the last 168 hours. **            I have reviewed all pertinent labs within the past 24 hours.    Estimated Creatinine Clearance: 38.1 mL/min (A) (based on SCr of 2.7 mg/dL (H)).    Diagnostic Results:  I have reviewed all pertinent imaging results/findings within the past 24 hours.  Assessment and Plan:     Mr. Richards is a 60 yo black male with stage D HFrEF who was previously supported with a HM2 (implanted 3/8/2018 as DT-VAD) but required pump exchange (HM3 pump exchange 7/13/2022) for thrombosis of pump post COVID-19 PNA and AHRF. His post-op course following pump exchange was prolonged and complicated by worsening cardiogenic shock/RV failure requiring VA-ECMO. He also had recurrent VT as well as atrial flutter with RVR and is on chronic amiodarone and mexiletine. In June/July 2022 he was tapered of steroids (on for amiodarone hyperthyroid) due to concern for avascular necrosis of hip. 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 He completed course of ertapenem and voriconazole.     He reached out to the heart failure clinic in regards to his alcohol problem and wanted help with detox as most facilities wouldn't accept a patient with an lvad. Patient reports correct Warfarin dose, pt fell, hit his head and twisted his knee on 3/4/25, saw LVAD Team for the fall, has been taking Ibuprofen since the fall, had alcohol on 3/4/25, no other changes reported.     He reports drinking alcohol for about the past year and a half approximately 5 days a week drinks about a 3rd of a L bottle of liquor daily.  He reports he does not drink 1st thing in the morning occasionally he will start drinking around 3:00 p.m..  He has never had withdrawals in the past  but does report hand tremors that improve with drinking alcohol.  He reports his father was an alcoholic and him in his brother have had issues with it in the past.  He recognizes that his drinking is a problem and has resulted in multiple injuries from losing his balance and stumbling.  He reports his last drink was Tuesday.  He denies any anxiety, tremors, or hallucinations.  On Tuesday he was drinking and lost his balance and fell and his wife reports trying to get up multiple times it continued to fall and fell and hit his face on the TV stand also have a right knee injury with swelling at the proximal area of the joint.  He reports he is able to bear weight but has been using a cane to walk.  [facility] [place] [facility] [place] issue.  He has been taking his medications as prescribed.  He reports some ongoing anxiety especially related to getting shocked by his ICD.  He was last shocked 4-1/2 months ago.    Fall  -associated with etoh intoxication has L orbit ecchymosis and R knee swelling, no neurological deficits on exam, no HA neck pain, changes in vision, able to bear weight on knee no joint instability     -nursing to do local wound care to superficial laceration to L temporal area   -CT head non contrast ordered   -R knee XR ordered     Anticoagulation monitoring, INR range 2-3  -INR supratheraputic in clinic, will hold warfarin for now  -daily INR     Depression with anxiety  -as need home xanax, holding for now as pt is on CIWA protocol   -addiction psych as above     History of ventricular tachycardia  Last ICD shock 4.5 months ago     -continuous telemetry   -replace electrolytes prn     amiodarone induced hypothyrodism  -continue home levothyroxine    LVAD (left ventricular assist device) present  Procedure: Device Interrogation Including analysis of device parameters  Current Settings: Ventricular Assist Device  INR therapeutic, will continue coumadin dose per PharmD  Review of device  function is stable/unstable stable        3/7/2025     7:20 AM 3/7/2025     3:44 AM 3/7/2025    12:02 AM 3/6/2025     7:00 PM 3/6/2025     3:29 PM 11/6/2024    10:02 AM 3/7/2024     2:17 PM   TXP LVAD INTERROGATIONS   Type HeartMate3 HeartMate3 HeartMate3 HeartMate3 HeartMate3     Flow 5.5 5.3 5.6 5.6 5.6     Speed 6300 6300 6350 6300 6300     PI 1.9 1.7 1.8 1.4 1.6     Power ([place]) 5.5 5.5 5.6 5.5 5.7     LSL 5900 5900 5900 5900 5900     Pulsatility Intermittent pulse No Pulse No Pulse   No Pulse No Pulse         Hypertension  -continue home amlodipine    Combined systolic and diastolic cardiac dysfunction  See LVAD     Alcohol use disorder  Reports drinking appox 5 days/ week, usually starts drinking when it is dark outside occasionally starts earlier no earlier than 3pm. Drinks 1/3 of a liter bottle of liqour per day. Reports multiple injuries associated with etoh abuse. Expresses he wants to quit but needs help. Denies history of Dts or prior etoh withdrawal. He reports he has had hand tremors. Last drink was on Tuesday with associated fall hitting his L eye and R knee. Family history of alcoholism.     -consult placed for addiction psych   -[facility] protocol   -B12, folate ordered   - Liver u/s pending          Yadiel Coley PA-C  Heart Transplant  [facility]

## 2025-03-07 NOTE — PROGRESS NOTES
Dc Note     MDT completed. Pt's [facility] scheduled for tomorrow (3/8/25). Met with pt and pt's spouse, Kelly, in pt's room to discuss pt's anticipated dc tomorrow. Informed pt's spouse that pt stated she has concerns with his alcohol use. Informed pt's spouse that psych met with pt today. Also, informed pt and pt's spouse that SW attached Alcohol and Health Education and Alcohol Tx Ctrs to pt's AVS that will be provided to pt at [place]. Pt and pt's spouse were appreciative to resources. Pt's spouse will transport pt home at dc. Pt and pt's spouse did not verbalize any further dc needs/concerns. No dc needs indicated by MDT. Pt will have support of his spouse post-dc. Pt reports coping well and denies further needs, questions, concerns at this time and none indicated. Providing psychosocial and counseling support, education, resources, assistance and discharge planning as indicated. SW remains available.     Xerosis Aggressive Treatment: I recommended application of Cetaphil or CeraVe numerous times a day going to bed to all dry areas. I also prescribed a topical steroid for twice daily use.

## 2025-03-07 NOTE — SUBJECTIVE & OBJECTIVE
Interval History: No events overnight. Patient denies complaints. CTH and R knee radiographs with evidence of soft tissue bruising from his fall earlier this week but no fractures or intracranial process. Other than very fine tremor in hands no other symptoms or evidence of withdrawals.    Continuous Infusions:  Scheduled Meds:   amiodarone  400 mg Oral Daily    amLODIPine  10 mg Oral Daily    ferrous gluconate  324 mg Oral BID WM    levothyroxine  125 mcg Oral Before breakfast    mexiletine  250 mg Oral Q8H    mirtazapine  30 mg Oral QHS    mupirocin   Nasal BID    omega-3 acid ethyl esters  2 g Oral BID    pantoprazole  40 mg Oral Daily with lunch    polyethylene glycol  17 g Oral Daily    warfarin  5 mg Oral Daily     PRN Meds:  Current Facility-Administered Medications:     lorazepam, 2 mg, Intravenous, Q2H PRN    LORazepam, 2 mg, Oral, Q4H PRN    senna-docusate 8.6-50 mg, 1 tablet, Oral, Daily PRN    Review of patient's allergies indicates:   Allergen Reactions    Lisinopril Anaphylaxis    Hydralazine     Hydralazine analogues      Chronic constipation, impotence, dizziness     Objective:     Vital Signs (Most Recent):  Temp: 98.3 °F (36.8 °C) (03/07/25 0711)  Pulse: 62 (03/07/25 1034)  Resp: 18 (03/07/25 0711)  BP: (!) 84/0 (03/07/25 0715)  SpO2: (!) 94 % (03/07/25 0711) Vital Signs (24h Range):  Temp:  [96.9 °F (36.1 °C)-98.5 °F (36.9 °C)] 98.3 °F (36.8 °C)  Pulse:  [30-72] 62  Resp:  [18-20] 18  SpO2:  [94 %-98 %] 94 %  BP: (78-92)/(0) 84/0     Patient Vitals for the past 72 hrs (Last 3 readings):   Weight   03/06/25 1506 106 kg (233 lb 9.2 oz)     Body mass index is 30.82 kg/m².      Intake/Output Summary (Last 24 hours) at 3/7/2025 1125  Last data filed at 3/7/2025 0805  Gross per 24 hour   Intake 851 ml   Output 325 ml   Net 526 ml          Physical Exam  Constitutional:       Appearance: Normal appearance.   HENT:      Head: Normocephalic and atraumatic.   Neck:      Vascular: No JVD.   Cardiovascular:       Rate and Rhythm: Normal rate and regular rhythm.      Pulses: Normal pulses.      Heart sounds: Normal heart sounds.      Comments: Smooth LVAD hum.   Pulmonary:      Effort: Pulmonary effort is normal.      Breath sounds: Normal breath sounds.   Abdominal:      General: Abdomen is flat. There is no distension.      Palpations: Abdomen is soft.      Tenderness: There is no abdominal tenderness.   Musculoskeletal:      Cervical back: Normal range of motion and neck supple.      Right lower leg: No edema.      Left lower leg: No edema.   Skin:     General: Skin is warm and dry.   Neurological:      General: No focal deficit present.      Mental Status: He is alert and oriented to person, place, and time.   Psychiatric:         Mood and Affect: Mood normal.         Behavior: Behavior normal.            Significant Labs:  CBC:  Recent Labs   Lab 03/06/25 1613 03/07/25 0459   WBC 5.48 3.83*   RBC 4.72 4.74   HGB 14.0 13.9*   HCT 43.9 44.0    182   MCV 93 93   MCH 29.7 29.3   MCHC 31.9* 31.6*     BNP:  Recent Labs   Lab 03/07/25 0459   BNP 66     CMP:  Recent Labs   Lab 03/06/25 1613 03/07/25  0459   GLU 82 84   CALCIUM 9.9 9.5   ALBUMIN  --  3.6   PROT  --  8.0    136   K 3.9 3.6   CO2 25 27   CL 98 99   BUN 27* 30*   CREATININE 2.9* 2.7*   ALKPHOS  --  97   ALT  --  162*   AST  --  247*   BILITOT  --  0.6      Coagulation:   Recent Labs   Lab 03/06/25  1004 03/06/25 1613 03/07/25 0459   INR 3.3* 4.1* 2.5*   APTT  --  55.5* 52.9*     LDH:  Recent Labs   Lab 03/06/25 1613 03/07/25  0459   * 199     Microbiology:  Microbiology Results (last 7 days)       ** No results found for the last 168 hours. **            I have reviewed all pertinent labs within the past 24 hours.    Estimated Creatinine Clearance: 38.1 mL/min (A) (based on SCr of 2.7 mg/dL (H)).    Diagnostic Results:  I have reviewed all pertinent imaging results/findings within the past 24 hours.

## 2025-03-07 NOTE — PLAN OF CARE
Recommendations     1.) Recommend continuing with Cardiac Diet as tolerated, fluid per MD.      2.) RD to monitor wt, PO intake, skin, labs.       Goals: To meet % of EEN/EPN by next RD f/u   Nutrition Goal Status: new  Communication of RD Recs:  (POC)

## 2025-03-07 NOTE — PLAN OF CARE
Plan of care discussed with patient.  Patient ambulated in egan x1 with standby assist. Patient up in chair in afternoon/dinner, fall precautions in place. Ambulation promoted.  LVAD DP and numbers WNL, smooth LVAD hum. See previous note for elevated doppler.   Patient complained of pain in R knee, treated with prn tylenol. Patient complained of anxiety, treated with prn xanax. Pt requested medication for constipation, miralax and stool softeners ordered and given.  CIWA assessment completed per orders. Seizure precautions maintained per orders.  Pt had liver ultrasound today. Pt had LVAD controller switch today.   LVAD DLES cultures sent to lab.  Discussed medications and care. Patient has no questions at this time.       Problem: Adult Inpatient Plan of Care  Goal: Plan of Care Review  Outcome: Progressing  Flowsheets (Taken 3/7/2025 1719)  Plan of Care Reviewed With:   patient   spouse  Goal: Absence of Hospital-Acquired Illness or Injury  Outcome: Progressing  Intervention: Identify and Manage Fall Risk  Flowsheets (Taken 3/7/2025 1719)  Safety Promotion/Fall Prevention:   assistive device/personal item within reach   Fall Risk reviewed with patient/family   Fall Risk signage in place   medications reviewed   instructed to call staff for mobility   nonskid shoes/socks when out of bed   patient expresses understanding of fall risk and prevention   side rails raised x 2   room near unit station  Intervention: Prevent Skin Injury  Flowsheets (Taken 3/7/2025 1719)  Body Position: position changed independently  Skin Protection:   incontinence pads utilized   transparent dressing maintained  Device Skin Pressure Protection:   absorbent pad utilized/changed   adhesive use limited   positioning supports utilized  Intervention: Prevent and Manage VTE (Venous Thromboembolism) Risk  Flowsheets (Taken 3/7/2025 1719)  VTE Prevention/Management:   bleeding risk assessed   bleeding precautions maintained   ambulation  promoted   ROM (active) performed   dorsiflexion/plantar flexion performed  Intervention: Prevent Infection  Flowsheets (Taken 3/7/2025 1719)  Infection Prevention:   equipment surfaces disinfected   hand hygiene promoted   rest/sleep promoted   single patient room provided     Problem: Fall Injury Risk  Goal: Absence of Fall and Fall-Related Injury  Outcome: Progressing  Intervention: Identify and Manage Contributors  Flowsheets (Taken 3/7/2025 1719)  Self-Care Promotion:   independence encouraged   BADL personal objects within reach   adaptive equipment use encouraged  Medication Review/Management: medications reviewed  Intervention: Promote Injury-Free Environment  Flowsheets (Taken 3/7/2025 1719)  Safety Promotion/Fall Prevention:   assistive device/personal item within reach   Fall Risk reviewed with patient/family   Fall Risk signage in place   medications reviewed   instructed to call staff for mobility   nonskid shoes/socks when out of bed   patient expresses understanding of fall risk and prevention   side rails raised x 2   room near unit station     Problem: Ventricular Assist Device  Goal: Absence of Bleeding  Outcome: Progressing  Intervention: Monitor and Manage Bleeding  Flowsheets (Taken 3/7/2025 1719)  Bleeding Precautions:   blood pressure closely monitored   coagulation study results reviewed  Bleeding Management: dressing monitored

## 2025-03-07 NOTE — PLAN OF CARE
Patient does not require acute PT services at this time due to being at (I) PLOF and demonstrated safety with functional mobility, including transfers and ambulation    Problem: Physical Therapy  Goal: Physical Therapy Goal  Outcome: Met   3/7/2025     no

## 2025-03-07 NOTE — DISCHARGE INSTRUCTIONS
[facility]    Alcoholics Anonymous  [address] [place] [id number]  [phone]    [address] [address]  [phone]

## 2025-03-07 NOTE — NURSING
Pt arrived to room 3097 via wheelchair. Patient AOX4 and VSS. Patient denies SOB or chest pain. LVAD equipment inventory taken. Skin assessment performed with Magalys DIAZ, sacral photo taken and uploaded in chart. Seizure precautions initiated per orders. Patient educated on call bell and fall prevention, pt verbalized understanding. Bed locked and in lowest position, call bell within reach. HTS called, VAD coordinator Bridget called.

## 2025-03-07 NOTE — PT/OT/SLP EVAL
Physical Therapy Evaluation and Discharge Note    Patient Name:  Tim Richards   MRN:  [id number]    Recommendations:     Discharge Recommendations: No Therapy Indicated  Discharge Equipment Recommendations: none   Barriers to discharge: None    Assessment:     Tim Richards is a 59 y.o. male admitted with a medical diagnosis of <principal problem not specified>. .  At this time, patient is functioning at their prior level of function and does not require further acute PT services.     Recent Surgery: * No surgery found *      Plan:     During this hospitalization, patient does not require further acute PT services.  Please re-consult if situation changes.      Subjective     Chief Complaint: R knee stiffness after amb  Patient/Family Comments/goals: To go home  Pain/Comfort:  Pain Rating 1: 0/10  Location - Side 1: Right  Location - Orientation 1: generalized  Location 1: knee  Pain Addressed 1: Reposition, Distraction  Pain Rating Post-Intervention 1:  (Pt did not rate)    Patients cultural, spiritual, Sikhism conflicts given the current situation: no    Patient History:     Living Environment: Pt lives with spouse in 2 story split level house with 7 SALVADOR and jaleel HR (wide). Bathroom: walk-in shower. Pt bed and bath located on 2nd floor with ~12 stairs and L HR to access   Prior Level of Function: IND  DME owned: SPC, shower chair, RW, SW  Caregiver Assistance: spouse      Objective:     Communicated with RN prior to session.  Patient found HOB elevated with telemetry, LVAD upon PT entry to room.    General Precautions: Standard, fall    Orthopedic Precautions:N/A   Braces: N/A  Respiratory Status: Room air    Exams:  Gross Motor Coordination:  WFL  Sensation:    -       Intact  RLE ROM: WFL  RLE Strength: WFL  LLE ROM: WFL  LLE Strength: WFL    Functional Mobility:    Bed Mobility:   Supine > Sit: independence    Transfers:   Sit <> Stand Transfer: independence from EOB without AD    Balance:   Sitting  balance: GOOD+: Maintains balance through MAXIMAL excursions of active trunk motion  Standing balance:   GOOD: Takes MODERATE challenges from all directions  GOOD: Needs SUPERVISION only during gait and able to self right with moderate LOB                 Gait:  Distance: ~160 ft   Assistive Device: no AD  Assistance Level: supervision  Gait Assessment: Pt amb with fair arminda and mild lateral sway due to reported R knee stiffness. No LOB    AM-PAC 6 CLICK MOBILITY  Total Score:24       Treatment and Education:  Pt educated on tip to reduce fall risk and safety with mobility and using call button for assistance from nursing staff with OOB mobility.  Pt educated on sitting up in chair throughout most of day  Pt educated on amb 2-3x per day with assistance from staff and increased movement during hospital stay.  All questions answered within the scope of PT.  White board updated accordingly.    AM-PAC 6 CLICK MOBILITY  Total Score:24     Patient left sitting edge of bed with all lines intact and call button in reach.    GOALS:   Multidisciplinary Problems       Physical Therapy Goals       Not on file              Multidisciplinary Problems (Resolved)          Problem: Physical Therapy    Goal Priority Disciplines Outcome Interventions   Physical Therapy Goal   (Resolved)     PT, PT/OT Met                        DME Justifications:  No DME recommended requiring DME justifications    History:     Past Medical History:   Diagnosis Date    A-fib     Anticoagulant long-term use     Atrial flutter 7/30/2022    CHF (congestive heart failure)     Class 1 obesity due to excess calories with serious comorbidity and body mass index (BMI) of 31.0 to 31.9 in adult     Class 1 obesity due to excess calories with serious comorbidity and body mass index (BMI) of 32.0 to 32.9 in adult     Dilated cardiomyopathy 1/10/2018    Disorder of kidney and ureter     CKD    Encounter for blood transfusion     Essential hypertension 8/28/2022     Gout     HTN (hypertension)     Hx of psychiatric care     ICD (implantable cardioverter-defibrillator) infection 7/1/2020    Psychiatric problem     Thyroid disease     Ventricular tachycardia (paroxysmal)        Past Surgical History:   Procedure Laterality Date    AORTIC VALVULOPLASTY N/A 07/13/2022    Procedure: REPAIR, AORTIC VALVE;  Surgeon: Yg Kaufman MD;  [facility];  Service: Cardiovascular;  Laterality: N/A;    APPLICATION OF WOUND VACUUM-ASSISTED CLOSURE DEVICE N/A 07/15/2022    Procedure: APPLICATION, WOUND VAC;  Surgeon: Yg Kaufman MD;  [facility];  Service: Cardiovascular;  Laterality: N/A;  50 x 5 cm     APPLICATION OF WOUND VACUUM-ASSISTED CLOSURE DEVICE Right 09/27/2022    Procedure: APPLICATION, WOUND VAC;  Surgeon: Kole Tabares MD;  [facility];  Service: Plastics;  Laterality: Right;    CARDIAC CATHETERIZATION  12/2012    CARDIAC DEFIBRILLATOR PLACEMENT Left     CRRT-D    CARDIAC VALVE REPLACEMENT  03/08/2018    LVAD Implant    COLONOSCOPY N/A 03/06/2018    Procedure: COLONOSCOPY;  Surgeon: Alonso Bone MD;  Location: [facility] ENDO (2ND FLR);  Service: Endoscopy;  Laterality: N/A;    COLONOSCOPY N/A 07/17/2019    Procedure: COLONOSCOPY;  Surgeon: Blane Valdez MD;  Location: [facility] ENDO (2ND FLR);  Service: Endoscopy;  Laterality: N/A;    COLONOSCOPY N/A 07/18/2019    Procedure: COLONOSCOPY;  Surgeon: Blane Valdez MD;  Location: [facility] ENDO (2ND FLR);  Service: Endoscopy;  Laterality: N/A;    CREATION OF ILIOFEMORAL ARTERY BYPASS Right 09/27/2022    Procedure: CREATION, BYPASS, ARTERIAL, ILIAC TO FEMORAL WITH GRAFT;  Surgeon: Zach Hernández MD;  [facility];  Service: Peripheral Vascular;  Laterality: Right;    CREATION OF MUSCLE ROTATIONAL FLAP Right 09/27/2022    Procedure: CREATION, FLAP, MUSCLE ROTATION, SARTORIUS AND RECTUS FEMORIS;  Surgeon: Kole Tabares MD;  [facility];  Service: Plastics;   Laterality: Right;    ESOPHAGOGASTRODUODENOSCOPY N/A 07/17/2019    Procedure: EGD (ESOPHAGOGASTRODUODENOSCOPY);  Surgeon: Blane Valdez MD;  Location: [facility] ENDO (2ND FLR);  Service: Endoscopy;  Laterality: N/A;    ESOPHAGOGASTRODUODENOSCOPY N/A 07/18/2019    Procedure: EGD (ESOPHAGOGASTRODUODENOSCOPY);  Surgeon: Blane Valdez MD;  Location: [facility] ENDO (2ND FLR);  Service: Endoscopy;  Laterality: N/A;    FOREIGN BODY REMOVAL N/A 07/22/2022    Procedure: REMOVAL, FOREIGN BODY;  Surgeon: Yg Kaufman MD;  Location: [facility] OR 2ND FLR;  Service: Cardiovascular;  Laterality: N/A;  LVAD Heartmate 2 drive line removal    INSERTION OF GRAFT TO PERICARDIUM N/A 07/15/2022    Procedure: INSERTION, GRAFT, PERICARDIUM;  Surgeon: Yg Kaufman MD;  Location: [facility] OR [facility] FLR;  Service: Cardiovascular;  Laterality: N/A;    IRRIGATION OF MEDIASTINUM N/A 07/15/2022    Procedure: IRRIGATION, MEDIASTINUM;  Surgeon: Yg Kaufman MD;  Location: [facility] OR [facility] FLR;  Service: Cardiovascular;  Laterality: N/A;    LYSIS OF ADHESIONS  07/13/2022    Procedure: LYSIS, ADHESIONS;  Surgeon: Yg Kaufman MD;  [facility];  Service: Cardiovascular;;    NONINVASIVE CARDIAC ELECTROPHYSIOLOGY STUDY N/A 10/18/2019    Procedure: CARDIAC ELECTROPHYSIOLOGY STUDY, NONINVASIVE;  Surgeon: Raz Wagner MD;  Location: [facility] EP LAB;  Service: Cardiology;  Laterality: N/A;  VT, DFTs, MDT CRTD in situ, LVAD, LASHELL pizarro, 3098    REPLACEMENT OF IMPLANTABLE CARDIOVERTER-DEFIBRILLATOR (ICD) GENERATOR N/A 03/09/2020    Procedure: REPLACEMENT, ICD GENERATOR;  Surgeon: Harry Yun MD;  Location: [facility] EP LAB;  Service: Cardiology;  Laterality: N/A;  VT, ICD Gen Change and Lead Revision, MDT, MAC, DM,3 Prep    REPLACEMENT OF LEFT VENTRICULAR ASSIST DEVICE (LVAD)  07/13/2022    Procedure: REPLACEMENT, LVAD;  Surgeon: Yg Kaufman MD;  Location: [facility] OR [facility] FLR;  Service: Cardiovascular;;    REPLACEMENT OF PUMP N/A 07/13/2022    Procedure: REPLACEMENT,  PUMP;  Surgeon: Yg Kaufman MD;  Location: [facility] OR [facility] FLR;  Service: Cardiovascular;  Laterality: N/A;  LVAD pump exchange  EXPLANATION OF HEATMATE 2  IMPLANTATION OF HEARTMATE 3  IMPLANTATION OF 8MM CHIMNEY GRAFT TO RFA  INITIATION OF ECMO  TEMPORARY CLOSURE OF CHEST    REVISION OF IMPLANTABLE CARDIOVERTER-DEFIBRILLATOR (ICD) ELECTRODE LEAD PLACEMENT N/A 03/09/2020    Procedure: REVISION, INSERTION, ELECTRODE LEAD, ICD;  Surgeon: Harry Yun MD;  Location: [facility] EP LAB;  Service: Cardiology;  Laterality: N/A;  VT, ICD Gen Change and Lead Revision, MDT, MAC, DM,3 Prep    RIGHT HEART CATHETERIZATION Right 09/08/2023    Procedure: INSERTION, CATHETER, RIGHT HEART;  Surgeon: Gaurav Rodriguez MD;  Location: [facility] CATH LAB;  Service: Cardiology;  Laterality: Right;    STERNAL WOUND CLOSURE N/A 07/14/2022    Procedure: CLOSURE, WOUND, STERNUM;  Surgeon: Yg Kaufman MD;  Location: [facility] OR [facility] FLR;  Service: Cardiovascular;  Laterality: N/A;  temporary closure  evacuation of hematoma    STERNAL WOUND CLOSURE N/A 07/15/2022    Procedure: CLOSURE, WOUND, STERNUM;  Surgeon: Yg Kaufman MD;  Location: [facility] OR [facility] FLR;  Service: Cardiovascular;  Laterality: N/A;    TRACHEOSTOMY N/A 08/04/2022    Procedure: CREATION, TRACHEOSTOMY;  Surgeon: Germain Holt MD;  [facility];  Service: General;  Laterality: N/A;    TREATMENT OF CARDIAC ARRHYTHMIA  10/18/2019    Procedure: Cardioversion or Defibrillation;  Surgeon: Raz Wagner MD;  Location: [facility] EP LAB;  Service: Cardiology;;       Time Tracking:     PT Received On: 03/07/25  PT Start Time: 1041     PT Stop Time: 1057  PT Total Time (min): 16 min     Billable Minutes: Evaluation 8 and Gait Training 8      03/07/2025

## 2025-03-07 NOTE — PROGRESS NOTES
Admit Note     Met with patient to assess needs. Patient is a 59 y.o.  male, admitted for Presence of heart assist device, Alcohol dependence, and LVAD. Pt received LVAD in 2018 and pump (HM3) exchange in 2022. Pt does his own dressing changes.    Patient admitted on 3/6/2025. At this time, patient presents as alert and oriented x 4 and communicative.  At this time, patients caregiver is not in attendance.    Household/Family Systems     Patient resides with his spouse. Support system includes spouse. Pt states he has no other support aside from his spouse. Pt has 3 adults children that resides out-of-state. Patient does not have dependents that are need of being cared for.    Pt's home address: [address] [place] [address]    Pt's phone number: [phone]     Patients primary caregiver is self with support from his spouse. Pt's spouse works full-time as an acct mgr. Confirmed patients emergency contact information as follows:     Kelly Richards, spouse, [phone]    During admission, patient's caregiver plans to stay at home. Confirmed patient and patients caregivers do have access to reliable transportation.    Cognitive Status/Learning     Patient reports reading ability as college and states he does not have difficulty with reading, writing, hearing, learning, and memory. Pt states that he wears eyeglasses due to myopia. Pt states that his cognitive abilities have decreased. Patient reports he learns best by reading.   Needed: No.   Highest education level: Attended College/Technical School    Vocation/Disability   .  Working for Income: No  If no, reason not working: Disability  Patient is disabled due to HF since 2020.  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    Adherence     Patient reports a low level of adherence to his health care regimen. Adherence counseling and education provided.  Patient verbalizes understanding.     Substance Use    Patient reports the following substance usage.    Tobacco:  Pt denies tobacco use at present . Pt states that he quit smoking cigarettes 6 years ago.  Alcohol:  Pt reports heavy alcohol use on 3/4/25. Pt reports that he struggles with alcohol and his spouse his concerns about his usage.  Illicit Drugs/Non-prescribed Medications: none, patient denies any use.  Patient states clear understanding of the potential impact of substance use.  Substance abstinence/cessation counseling, education and resources provided and reviewed.     Services Utilizing/ADLS    Infusion Service: Prior to admission, patient utilizing? no [facility] ([phone], [phone]/F) in the past   Home Health: Prior to admission, patient utilizing? no [facility] ([phone]) in the past. Pt also states that he went to [facility] for PT ([phone], [phone]/C) in the past   DME: Prior to admission, yes Pt has a Walker, Hurrycane, and SC at home.  Pulmonary/Cardiac Rehab: Prior to admission, no  Dialysis:  Prior to admission, no  Transplant Specialty Pharmacy:  Prior to admission, no.    Prior to admission, patient reports he was independent with ADLS and was driving. Pt ambulates with Hurrycane pta. Pt reports he had a fall 4 days pta. Patient reports he is able to care for self at this time. Patient indicates a willingness to care for self once medically cleared to do so.    Insurance/Medications    Insured by   Payer/Plan Subscr  Sex Relation Sub. Ins. ID Effective Group Num   1. OHP MEDICARE * LAURA PHELPSFRANCIS HA* 1965 Male Self [id number]                                   [address]      Primary Insurance (for UNOS reporting): Public Insurance - Medicare & Choice  Secondary Insurance (for UNOS reporting): None    Patient reports he is able to obtain and afford medications at this time and at time of discharge.    Living Will/Healthcare Power of     Pt states that  his spouse is his HCPA. Pt states that he does not have a LW.  provided education regarding LW and HCPA and the completion of forms.    Coping/Mental Health    Patient is coping adequately with the aid of  family members. Patient indicates mental health difficulties. Pt reports h/o anxiety and prescribed Xanax to manage. Pt states that he also enjoy watching TV to cope with his anxiety and f/u with [facility] 1x/yr. Pt denies SI and/or HI at present and in the past.    Discharge Planning    At time of discharge, patient plans to return to his home under the care of self and his spouse. Patients spouse will transport patient. Per rounds today, expected discharge date has not been medically determined at this time. Patient verbalizes understanding and are involved in treatment planning and discharge process.    Additional Concerns    Patient is being followed for needs, education, resources, information, emotional support, supportive counseling, and for supportive and skilled discharge plan of care.  providing ongoing psychosocial support, education, resources and d/c planning as needed.  SW remains available. Patient denies additional needs and/or concerns at this time. Patient verbalizes understanding and agreement with information reviewed, social work availability, and how to access available resources as needed.

## 2025-03-07 NOTE — PROGRESS NOTES
Elevated doppler, pt asymptomatic, PA Yadiel Coley notified, no new orders.    03/07/25 1140   Vital Signs   BP (!) 90/0   BP Location Right arm   BP Method Doppler   Patient Position Lying

## 2025-03-07 NOTE — ASSESSMENT & PLAN NOTE
Procedure: Device Interrogation Including analysis of device parameters  Current Settings: Ventricular Assist Device  INR therapeutic, will continue coumadin dose per PharmD  Review of device function is stable/unstable stable        3/7/2025     7:20 AM 3/7/2025     3:44 AM 3/7/2025    12:02 AM 3/6/2025     7:00 PM 3/6/2025     3:29 PM 11/6/2024    10:02 AM 3/7/2024     2:17 PM   TXP LVAD INTERROGATIONS   Type HeartMate3 HeartMate3 HeartMate3 HeartMate3 HeartMate3     Flow 5.5 5.3 5.6 5.6 5.6     Speed 6300 6300 6350 6300 6300     PI 1.9 1.7 1.8 1.4 1.6     Power ([place]) 5.5 5.5 5.6 5.5 5.7     LSL 5900 5900 5900 5900 5900     Pulsatility Intermittent pulse No Pulse No Pulse   No Pulse No Pulse

## 2025-03-07 NOTE — PATIENT CARE CONFERENCE
Visited with patient today to verify he has Emergency bag and all needed backup equipment with him. Patients emergency bag in room with all backup equipment. No visible damage. Patient stated he needed a new belt attachment. Supplied patient with Accessory kit. Asked patient if there was any damage to modular cable or drive line. Inspected patients modular cable. Small kink above bend relief. Patient also pointed out a small tear in white power lead. After further inspection confirmed patient has oxidation present in power lead. Spoke with VAD Coordinators to confirm controller exchange. Due to prior damage to Modular cable decided to exchange that as well. VAD Coordinator Ivette Guerrier performed Modular cable and Controller change. Self test complete on new Primary controller. Patient stated no other needs at this time.     New Primary- [id number]  B- [id number]  Modular cable-[id number]  Acc. Kit

## 2025-03-07 NOTE — PROGRESS NOTES
03/07/2025  Carissa Dean    Current provider:  Gaurav Rodriguez MD    Device interrogation:      3/7/2025    11:40 AM 3/7/2025     7:20 AM 3/7/2025     3:44 AM 3/7/2025    12:02 AM 3/6/2025     7:00 PM 3/6/2025     3:29 PM 11/6/2024    10:02 AM   TXP LVAD INTERROGATIONS   Type HeartMate3 HeartMate3 HeartMate3 HeartMate3 HeartMate3 HeartMate3    Flow 5.6 5.5 5.3 5.6 5.6 5.6    Speed 6350 6300 6300 6350 6300 6300    PI 1.6 1.9 1.7 1.8 1.4 1.6    Power ([place]) 5.6 5.5 5.5 5.6 5.5 5.7    LSL 5900 5900 5900 5900 5900 5900    Pulsatility Intermittent pulse Intermittent pulse No Pulse No Pulse   No Pulse          Rounded on Tim Richards to ensure all mechanical assist device settings (IABP or VAD) were appropriate and all parameters were within limits.  I was able to ensure all back up equipment was present, the staff had no issues, and the Perfusion Department daily rounding was complete.      For implantable VADs: Interrogation of Ventricular assist device was performed with analysis of device parameters and review of device function. I have personally reviewed the interrogation findings and agree with findings as stated.     In emergency, the nursing units have been notified to contact the perfusion department either by:  Calling [phone] from 630am to 4pm Mon thru Fri, utilizing the On-Call Finder functionality of Epic and searching for Perfusion, or by contacting the hospital  from 4pm to 630am and on weekends and asking to speak with the perfusionist on call.    12:17 PM

## 2025-03-07 NOTE — CARE UPDATE
Unit [facility]KK[facility] Care Support Interaction      I have reviewed the chart of Tim Richards who is hospitalized for <principal problem not specified>. The patient is currently located in the following unit: CSU        I have assisted the primary physician in management of the following:      MRSA Decolonization - CHG ordered. Mup ordered by primary      Rajani Wesley PA-C  Unit Based [facility]

## 2025-03-07 NOTE — PROGRESS NOTES
Visited patient at bedside.  Visible damage to modular cable and white power cable noted on original cable prior to exchange, indicating modular cable exchange was needed. Patient educated on what he may feel when modular cable is exchanged. Patient verbalized understanding. Patient was sitting down in the bed, guardrails up, modular cable connection was cleaned prior to disconnecting and patient was educated on proper cleaning practices for new cable. Assisted by ANNITA Wylie VAD tech, patient was seated, Modular cable was was disconnected by myself and new modular cable was connected to patient. All normal pump activities resumed and a self test was completed, controller functioning WNL.  All equipment numbers noted. Upon disconnecting cable, patient felt some symptoms and started shaking but once pump function resumed, patient's symptoms resolved.  It is medically necessary to ensure patient has properly functioning equipment and wearables to prevent infection, injury or death to patient.     Was asked to assess DLES while in the room, pretty good amount of drainage noted, patient noted he has had this drainage for a while but his cultures have never grown anything. DLES cultured and ID consult placed.

## 2025-03-07 NOTE — CONSULTS
[facility]   DEPARTMENT OF PSYCHIATRY     IDENTIFIERS & DEMOGRAPHICS:     -- PATIENT IDENTIFIERS: Tim Richards  [id number]  1965  59 y.o.  male  -- PRESENT WITH PATIENT DURING SESSION: ALONE  -- SOURCES OF INFORMATION: PATIENT  -- ENCOUNTER PROVIDER: Lynne Quiles MD        PRESENTATION:   History of Present Illness   **  OVERVIEW OF THE HPI:    60 yo w/ a past psych hx of anxiety and unspecified insomnia and a PMH significant for stage D HFrEF who was previously supported with a HM2 (implanted 3/8/2018 as DT-VAD) but required pump exchange (HM3 pump exchange 7/13/2022) for thrombosis of pump post COVID-19 PNA and AHRF. He presented on 3/5 as a direct admit from clinic after requesting detox    SUBJECTIVE/CURRENT FINDINGS:    Patient seen at bedside where he appears comfortable. He is pleasant and engaged in conversation. He discusses he began drinking heavily in 2020 after he was forced to retire from his job due to his worsening cardiac issues. States he mostly drinks because he feels bored and lonely. Reports his drinking has escalated over the past year to which he now drinks 1/3-1/4 of a Liter of Bacardi or Gin. He acknowledges that he has an addiction to alcohol and that it has resulted in multiple prior injuries from falls and has significantly strained his marriage. Reports he had a fall on Tuesday 3/4 while inebriated and was unable to get up for an hour. Pt's last drink was 3/4 around 8pm. He endorses wanting to quit completely and expresses interest in MAT. He denies hx of complicated withdrawal, only has hx of experiencing tremors. No current sx of withdrawal.    He notes he is prx xanax for anxiety for which he takes on average ~3x a week. He typically takes it if he needs to drive somewhere or if he feels anxious about doing something. Endorses sometimes drinking a few hours after benzo administration. Counseled on risk of potentially lethal respiratory depression with combining  "benzos and alcohol. Pt denied abuse of benzos or any other illicit substances.    Per Chart Review:    Per chart review, pt had a fall while inebriated on 3/4 which resulted in L orbit ecchymosis and R knee swelling. CTH ordered and showed soft tissue swelling, but no hemorrhage, infarct, or frx. Knee X-ray with suprapatellar bursal effusion. Pt was started on CIWA protocol but has not required scheduled or prn benzos.    Patient is prescribed xanax 1 mg BID, Ambien 12.5 mg nightly, and Remeron 30 mg nightly for anxiety and insomnia. Remeron was restarted this admission but xanax was held due implementation of CIWA protocol.    REVIEW OF SYSTEMS:     Y   Sleep Disturbance/Disruption  +insomnia     N   Appetite/Weight Change   Y   Alterations in Energy Level  +fatigue/anergia     Y   Impaired Focus/Concentration  +diminished ability to think    says he feels his "cognitive skills are slower." Now has difficulty completing calculations that were previously easy for him to do   Y   Depressive Symptomatology  says he feels "not happy a lot of the time"   Y   Excessive Anxiety/Worry  approx 3x a week for which he takes xanax   N   Dysregulated Mood/Behavior   N   Manic Symptomatology   N   Psychosis   U   Trauma-Related   N   Impulsivity/Compulsivity/Obsessionality   N   Disordered Eating   N   Dissociation   N   Pain   N   Cardiopulmonary Symptoms   N   Abnormal Involuntary Movements    Regarding the current presentation, no other significant issues or complaints are voiced or known at this time.      ADD-ON PSYCHOTHERAPY:     N/A         HISTORY:   Patient Information   **  >> SOURCES: patient       PSYCH  SUBSTANCE  FAMILY  SOCIAL  MEDICAL     Y   Previous/Pre-Existing Psychiatric Diagnoses  anxiety   Y   Past Psychotropic Trials  remeron, xanax, ambien   Y   Current Psychiatric Provider  Osmar Mejia PA-C   Y   Hx of " Outpatient Psychiatric Treatment   N   Hx of Psychiatric Hospitalization   N   Hx of Suicidal Ideation/Threats   N   Hx of Suicide Attempts/Gestures   N   Hx of Homicidal Ideation/Threats   N   Hx of Homicidal Behavior   N   Hx of Non-Suicidal Self-Injurious Behavior   N   Hx of Perpetrated Violence   N   Documented Hx of Malingering   N   Hx of Psychosis   N   Hx of Bipolar Diathesis   N   Hx of Depression   Y   Hx of Anxiety   Y   Hx of Insomnia   N   Hx of Delirium     N   Hx of Formal BASIL Treatment   Y   Recent Alcohol Consumption   Y   Hx of Nicotine Use  Quit 6 years ago   Y   Hx of Alcohol Misuse/Abuse   N   Hx of Illicit Drug Misuse/Abuse   N   Hx of Prescription Drug Misuse/Abuse   +   Drug Experimentation/Usage  Kratom: brother and father with aud.      Y   Family Psychiatric History      U   Hx of Trauma   U   Hx of Abuse     U   Developmental Delay/Disability   U   GED/High School Diploma   U   Post-Secondary Education   N   Currently Employed   Y   Currently on Disability   Y   Financially Stable   Y   Functions Independently   Y   Domiciled   Y   Intact Support System   Y   Heterosexual/Cisgender   Y   Currently in a Relationship   Y   Ever   once   Y   Currently    N   Ever /   Y   Children/Dependents   U   Catholic/Spiritual   Y   Hobbies/Recreational Activities   U   Hx of  Service     Y   Ever Charged/Convicted  DUI in 2010 which required intensive safe driving course   Y   Current Probation/[place]/Diversion   N   Hx of Incarceration     N   Hx of Seizures   N   Hx of Head Trauma     Y   Medical Hx & Diagnoses  Chronic combined systolic and diastolic congestive heart failure, LVAD present, VT, HTN, amiodarone induced hypothyroidism    Allergies  Lisinopril,  "Hydralazine   +   Key Diagnoses  +CVA, +CHF/arrythmia, +HTN, +thyroid disease      >> EHR ([facility]): reviewed/reconciled      The patient's past medical history has been reviewed and updated as appropriate within the electronic health record system.  See PROBLEM LIST & HISTORY for details.    Scheduled and PRN Medications: The electronic chart was reviewed and updated as appropriate.  See MEDCARD for details.    Allergies:  Lisinopril, Hydralazine, and Hydralazine analogues      EXAMINATION:   Objective   **  VITALS:  BP (!) 84/0 (BP Location: Right arm, Patient Position: Lying)   Pulse 61   Temp 98.3 °F (36.8 °C) (Oral)   Resp 18   Ht 6' 1" (1.854 m)   Wt 106 kg (233 lb 9.2 oz)   SpO2 (!) 94%   BMI 30.82 kg/m²     MENTAL STATUS EXAMINATION:  Appearance: appropriately dressed, adequately groomed (ecchymosis under L eye), in no apparent distress, well-appearing    Behavior & Attitude: participative, under good behavioral control, able to redirect, appropriate eye contact  calm, engaged, agreeable, cooperative    Movements & Motor Activity: no psychomotor agitation, no psychomotor retardation, no weakness, no spasticity, no rigidity, no tics, no tremor, no akathisia, no dyskinesia, no ataxia, no parkinsonism    Speech & Language: normal rate, normal volume, normal quantity, normal latency, spontaneous, reciprocal, fluent    Affect: euthymic, reactive, full range  appropriate given the situation/context, mood congruent    Thought Process & Associations: linear, goal-directed, organized, logical, coherent, relevant, abstract  no loosening of associations    Thought Content & Perceptions: no delusions, no paranoid ideation, no ideas of reference, no grandiosity, no hyperreligiosity, no hallucinations, no responding to internal stimuli    Sensorium: awake, alert, clear  no confusion, no delirium    Orientation: grossly intact, oriented to person, oriented to place, oriented to time, oriented to " situation    Recent & Remote Memory: intact (recent), intact (remote)    Attention & Concentration: intact  attentive to conversation, not easily distracted    Fund of Knowledge: intact, vocabulary proficient    Insight: intact, good  demonstrates sufficient awareness of condition/situation    Judgment: intact, good  heeds instructions/advice        RISK & REGULATORY:   Impression   **   RISK PARAMETERS:     N   Suicidal Ideation/Threats   N   Suicide Attempts/Gestures   N   Homicidal Ideation/Threats   N   Homicidal Behavior   N   Non-Suicidal Self-Injurious Behavior   N   Perpetrated Violence     NOTE: RISK PARAMETERS are current to the encounter/episode  NOT inclusive of past history.       SAFETY SCREENINGS:    -- PROTECTIVE FACTORS: IDENTIFIED       - SPECIFIC FACTORS IDENTIFIED: loving attachments, trusted provider    -- RISK FACTORS: IDENTIFIED     - SPECIFIC MODIFIABLE FACTORS IDENTIFIED: psychiatric sx, intoxication/use    -- RISK MITIGATION & PREVENTION:      - INTERVENTIONS: advice/counseling, harm reduction     MEDICAL DECISION MAKING:     - DIAGNOSTICS: a diagnostic psychiatric evaluation was performed and responsiveness to treatment was assessed  ability/capacity to respond to treatment: adequate/intact     REGULATORY:      INFORMED CONSENT & SHARED DECISION MAKING are the hallmark and bedrock of good clinical care, and as such have been employed and obtained, respectively, to the degree possible.  Discussed, to the extent possible, diagnosis, risks and benefits of proposed treatment (e.g., medication, therapy) vs alternative treatments vs no treatment, potential side effects of these treatments, and the inherent unpredictability of treatment.        WARNINGS & PRECAUTIONS:  >> In cases of emergencies (e.g. SI/HI resulting in danger to self or others, functioning deteriorating to the level of grave disability), call 911 or [phone], or present to the emergency department  for immediate assistance.    >> Individuals should not operate a motor vehicle or heavy machinery if effects of medications or underlying symptoms/condition impair the ability to do so safely.    >> FULLY comply with ANY/ALL medication as prescribed/instructed and report ANY/ALL suspected adverse effects to appropriate health care providers.      ASSESSMENT & PLAN:   Assessment & Plan   **  DIAGNOSES & PROBLEMS:     Alcohol use disorder  Alcohol withdrawal, uncomplicated  Anxiety and depression    -- PLAN (goals  recommentations):     >> no scheduled benzo taper required at this time. Can resume home xanax on prn basis  >> pt educated about MAT and encouraged to discuss this further with his outpatient psychiatrist   >> risk mitigation strategies and safety netting were employed, explored, and reinforced  >> psychotherapy was provided during the session  >> follow with primary care provider for routine health maintenance and management of medical co-morbidities, as well as any indicated/needed specialists  >> contingent on accessibility and willingness, patient would benefit from the following referrals/services: addiction rehab program, mutual self-help groups (e.g. Alcoholics Anonymous, SMART Recovery), and psychiatric intensive outpatient or partial hospital program (IOP/PHP)  >> advised to abstain from alcohol and illicit drug use  >> case discussed with staff psychiatrist  >> will sign off at this time, thank you    See below for pertinent laboratory and radiographic findings.      CHART REVIEW: available documentation has been reviewed, and pertinent elements of the chart have been incorporated into this evaluation where appropriate.       KEY & LINKS:        Y  = yes/endorses     N  = no/denies     U  = unknown/unable to assess    ADHD   AIMS   AUDIT   AUDIT-C   C-SSRS (Screen)   C-SSRS (Short)   C-SSRS (Full)   DAST   DAST-10   ALONZO-7   MoCA   PCL-5   PHQ-9   BASIL   YMRS      PSYCHIATRIC  SCREENINGS:       DIAGNOSTIC TESTING:   Results   **   Glu 84  3/7/2025  Li *   *  TSH 8.276 (H)  3/6/2025    HgA1c *   *  VPA *   *   FT4 1.32  3/6/2025    Na 136  3/7/2025  CLZ *   *  WBC 3.83 (L)  3/7/2025    Cr 2.7 (H)  3/7/2025  ANC 1.8; 45.9;   3/7/2025   Hgb 13.9 (L)  3/7/2025     BUN 30 (H)  3/7/2025  Trop I 0.082 (H)  4/7/2023  HCT 44.0  3/7/2025     GFR 26.5 (A)  3/7/2025   CPK 57  9/26/2022    3/7/2025     Alb 3.6  3/7/2025   PRL *   *  B12 655  3/6/2025     T Bili 0.6  3/7/2025  Chol 245 (H)  11/6/2024  B9 6.6  3/6/2025    ALP 97  3/7/2025  TGs 75  11/6/2024  B1 *   *     (H)  3/7/2025   (H)  11/6/2024  Vit D *   *      (H)  3/7/2025  LDL 95.0  11/6/2024  HIV Negative  6/27/2022     INR 2.5 (H)  3/7/2025  Carmencita 140 (H)  8/23/2022   Hep C Negative  6/27/2022    GGT *   *  Lip 85 (H)  8/23/2022  RPR *   *    MCV 93  3/7/2025   NH4 *   *  UPT *   *      PETH *   *  THC *   *    ETOH *   *  ROLANDO *   *    EtG *   *  AMP *   *    ALC *   *  OPI *   *    BZO *   *  MTD *   *     BAR *   *  BUP *   *    PCP *   *  FEN *   *     Results for orders placed or performed during the hospital encounter of 04/07/23   EKG 12-lead    Collection Time: 04/07/23  3:09 AM    Narrative    Test Reason : R07.9,    Vent. Rate : 141 BPM     Atrial Rate : 133 BPM     P-R Int : 000 ms          QRS Dur : 160 ms      QT Int : 326 ms       P-R-T Axes : -89 -60 094 degrees     QTc Int : 499 ms    Undetermined rhythm with marked artifact  Left axis deviation  Nonspecific intraventricular block  Abnormal ECG  When compared with ECG of 03-FEB-2023 14:48,  ST less depressed in Anterior leads  T wave inversion now evident in Inferior leads  Confirmed by Edmond MSOQUEDA MD ([id number]) on 4/7/2023 10:37:07 AM    Referred By: DELMAR HUTTON           Confirmed By:Edmond MOSQUEDA MD     Problem List[1]     Inpatient consult to Psychiatry  Consult performed by: Lynne Quiles,  MD  Consult ordered by: Shivani Dickinson MD Summer Mostafa, DO  [facility], PGY-2    [facility] CARDIAC ICU           [1]   Patient Active Problem List  Diagnosis    Cigarette nicotine dependence without complication    Alcohol use disorder    Pulmonary nodule seen on imaging study    Combined systolic and diastolic cardiac dysfunction    Palliative care encounter    Hyperbilirubinemia    Anemia    Hypertension    LVAD (left ventricular assist device) present    GIB (gastrointestinal bleeding)    Thrombocytopenia, unspecified    GI bleed    amiodarone induced hypothyrodism    Amiodarone-induced hyperthyroidism    CHICHI (obstructive sleep apnea)    Post traumatic stress disorder (PTSD)    History of ventricular tachycardia    Atrial flutter    Depression with anxiety    Stage 3b chronic kidney disease    Hypertensive cardiovascular-renal disease, stage 1-4 or unspecified chronic kidney disease, with heart failure    High risk medications (not anticoagulants) long-term use    Dilated cardiomyopathy    Anticoagulation monitoring, INR range 2-3    Impaired mobility    Left ventricular assist device (LVAD) complication, subsequent encounter    History of COVID-19    Hematoma of groin    Pseudoaneurysm of right femoral artery    Mycotic aneurysm    Postprocedural seroma of a circulatory system organ or structure following other circulatory system procedure    Other specified hypothyroidism    PVD (peripheral vascular disease)    Overweight with body mass index (BMI) of 28 to 28.9 in adult    Poor balance    Physical debility    Decreased functional activity tolerance    Decreased strength    Other acquired hemolytic anemias    Fall    Alcohol withdrawal, uncomplicated

## 2025-03-07 NOTE — PLAN OF CARE
Pt. Evaluated and performing at baseline   No goals established at this time.  Please re-consult if pt's status changes   Problem: Occupational Therapy  Goal: Occupational Therapy Goal  Outcome: Met

## 2025-03-07 NOTE — ASSESSMENT & PLAN NOTE
Reports drinking appox 5 days/ week, usually starts drinking when it is dark outside occasionally starts earlier no earlier than 3pm. Drinks 1/3 of a liter bottle of liqour per day. Reports multiple injuries associated with etoh abuse. Expresses he wants to quit but needs help. Denies history of Dts or prior etoh withdrawal. He reports he has had hand tremors. Last drink was on Tuesday with associated fall hitting his L eye and R knee. Family history of alcoholism.     -consult placed for addiction psych   -CIWA protocol   -B12, folate ordered   - Liver u/s pending

## 2025-03-07 NOTE — CONSULTS
"  [facility] - Cardiac Intensive Care  Adult Nutrition  Consult Note    SUMMARY     Recommendations    1.) Recommend continuing with Cardiac Diet as tolerated, fluid per MD.     2.) RD to monitor wt, PO intake, skin, labs.      Goals: To meet % of EEN/EPN by next RD f/u   Nutrition Goal Status: new  Communication of RD Recs:  (POC)    Nutrition Discharge Planning     Nutrition Discharge Planning: Therapeutic diet (comments)  Therapeutic diet (comments): Cardiac Diet- fluid per MD    Assessment and Plan    No nutritional dx at this time.    Reason for Assessment    Reason For Assessment: consult (nutritional assessment)  Diagnosis: other (see comments) (No Active principle problem)    General Information Comments: RD was consulted for nutritional assessment. PMHx: CHF w/ LVAD placement 2018- pump exchange 7/2022 for thrombosis, HTN, Gout, thyroid disease EtOH use; s/p episode of syncope. Pt denies n/v/d/c. Pt endorses good appetite, consuming % of their meals. Pt endorses good appetite PTA. Pt states that -235#, #. Pt appears to be nourished with no s/s of malnutrition. RD team to monitor and f/u.     Nutrition Related Social Determinants of Health: SDOH: Adequate food in home environment     Food Insecurity: No Food Insecurity (3/6/2025)    Hunger Vital Sign     Worried About Running Out of Food in the Last Year: Never true     Ran Out of Food in the Last Year: Never true      Nutrition/Diet History    Nutrition Intake History: Pt states they follow a cardiac diet at home and typically consume 2 meals/day  Food Preferences: Pt stated they like fruit and coffee  Spiritual, Cultural Beliefs, Synagogue Practices, Values that Affect Care: no  Food Allergies: NKFA  Factors Affecting Nutritional Intake: None identified at this time    Anthropometrics    Height: 6' 1" (185.4 cm)  Height (inches): 73 in  Height Method: Stated  Weight: 106 kg (233 lb 9.2 oz)  Weight (lb): 233.58 lb  Weight Method: " Standard Scale  Ideal Body Weight (IBW), Male: 184 lb  % Ideal Body Weight, Male (lb): 126.95 %  BMI (Calculated): 30.8  BMI Grade: 30 - 34.9- obesity - grade I    Lab/Procedures/Meds    Pertinent Labs Reviewed: reviewed  Pertinent Labs Comments: BUN: 30, Cr: 2.7, GFR: 26.5, phos: 2.4, AST: 247, ALT: 162, CRP: 63.5    Pertinent Medications Reviewed: reviewed  Pertinent Medications Comments: Amiodarone, Fe Gluconate, levothyroxine, mirtazapine, Omega3, pantoprazole, polyethylene glycol, warfarin    Estimated/Assessed Needs    Weight Used For Calorie Calculations: 106 kg (233 lb 11 oz)  Energy Calorie Requirements (kcal): 2418 kcal  Energy Need Method: [place]-St Jeor (* 1.25)    Protein Requirements: 121- 162g (1.5-2.0g/kg of IBW)  Weight Used For Protein Calculations: 80.7 kg (178 lb)    Fluid Requirements (mL): as per MD or RDA  Estimated Fluid Requirement Method: RDA Method  RDA Method (mL): 2418    CHO Requirement: 302 g    Nutrition Prescription Ordered    Current Diet Order: cardiac 2000 ml    Evaluation of Received Nutrient/Fluid Intake    I/O: +478ml since admit  Energy Calories Required: meeting needs  Protein Required: meeting needs  Fluid Required:  (as per MD)  Comments: LBM 3/2  Tolerance: tolerating  % Intake of Estimated Energy Needs: 75 - 100 %  % Meal Intake: 75 - 100 %    Nutrition Risk    Level of Risk/Frequency of Follow-up: low     Monitor and Evaluation    Monitor and Evaluation: Energy intake, Food and beverage intake, Beliefs and attitudes, Weight, Diet order, Electrolyte and renal panel, Gastrointestinal profile, Glucose/endocrine profile, Inflammatory profile, Lipid profile, Skin     Nutrition Follow-Up    RD Follow-up?: Yes

## 2025-03-07 NOTE — PLAN OF CARE
Plan of care discussed with patient.  Patient ambulating standby assist with cane, fall precautions in place. LVAD DP and numbers WNL, smooth LVAD hum. Patient has no complaints of pain. Discussed medications and care. Patient has no questions at this time.         Problem: Adult Inpatient Plan of Care  Goal: Plan of Care Review  Outcome: Progressing  Flowsheets (Taken 3/6/2025 1905)  Plan of Care Reviewed With:   patient   spouse     Problem: Fall Injury Risk  Goal: Absence of Fall and Fall-Related Injury  Outcome: Progressing  Intervention: Identify and Manage Contributors  Flowsheets (Taken 3/6/2025 1905)  Self-Care Promotion:   independence encouraged   BADL personal objects within reach   adaptive equipment use encouraged  Medication Review/Management: medications reviewed  Intervention: Promote Injury-Free Environment  Flowsheets (Taken 3/6/2025 1905)  Safety Promotion/Fall Prevention:   assistive device/personal item within reach   commode/urinal/bedpan at bedside   Fall Risk reviewed with patient/family   Fall Risk signage in place   family expresses understanding of fall risk and prevention   medications reviewed   nonskid shoes/socks when out of bed   patient expresses understanding of fall risk and prevention   room near unit station   side rails raised x 2     Problem: Ventricular Assist Device  Goal: Absence of Bleeding  Outcome: Progressing  Intervention: Monitor and Manage Bleeding  Flowsheets (Taken 3/6/2025 1905)  Bleeding Precautions:   blood pressure closely monitored   coagulation study results reviewed  Bleeding Management: dressing monitored

## 2025-03-08 VITALS
RESPIRATION RATE: 18 BRPM | TEMPERATURE: 98 F | SYSTOLIC BLOOD PRESSURE: 80 MMHG | WEIGHT: 230.63 LBS | OXYGEN SATURATION: 94 % | HEIGHT: 73 IN | BODY MASS INDEX: 30.57 KG/M2 | HEART RATE: 59 BPM

## 2025-03-08 PROBLEM — T82.7XXA: Status: ACTIVE | Noted: 2025-03-08

## 2025-03-08 LAB
ANION GAP SERPL CALC-SCNC: 10 MMOL/L (ref 8–16)
APTT PPP: 44.6 SEC (ref 21–32)
BASOPHILS # BLD AUTO: 0.02 K/UL (ref 0–0.2)
BASOPHILS NFR BLD: 0.5 % (ref 0–1.9)
BUN SERPL-MCNC: 29 MG/DL (ref 6–20)
CALCIUM SERPL-MCNC: 9.6 MG/DL (ref 8.7–10.5)
CHLORIDE SERPL-SCNC: 100 MMOL/L (ref 95–110)
CO2 SERPL-SCNC: 24 MMOL/L (ref 23–29)
CREAT SERPL-MCNC: 2.6 MG/DL (ref 0.5–1.4)
DIFFERENTIAL METHOD BLD: ABNORMAL
EOSINOPHIL # BLD AUTO: 0.2 K/UL (ref 0–0.5)
EOSINOPHIL NFR BLD: 4.3 % (ref 0–8)
ERYTHROCYTE [DISTWIDTH] IN BLOOD BY AUTOMATED COUNT: 14.2 % (ref 11.5–14.5)
EST. GFR  (NO RACE VARIABLE): 27.7 ML/MIN/1.73 M^2
GLUCOSE SERPL-MCNC: 103 MG/DL (ref 70–110)
HCT VFR BLD AUTO: 43.3 % (ref 40–54)
HGB BLD-MCNC: 13.5 G/DL (ref 14–18)
IMM GRANULOCYTES # BLD AUTO: 0.01 K/UL (ref 0–0.04)
IMM GRANULOCYTES NFR BLD AUTO: 0.2 % (ref 0–0.5)
INR PPP: 1.9 (ref 0.8–1.2)
LDH SERPL L TO P-CCNC: 199 U/L (ref 110–260)
LYMPHOCYTES # BLD AUTO: 1.2 K/UL (ref 1–4.8)
LYMPHOCYTES NFR BLD: 28.3 % (ref 18–48)
MAGNESIUM SERPL-MCNC: 2.2 MG/DL (ref 1.6–2.6)
MCH RBC QN AUTO: 29 PG (ref 27–31)
MCHC RBC AUTO-ENTMCNC: 31.2 G/DL (ref 32–36)
MCV RBC AUTO: 93 FL (ref 82–98)
MONOCYTES # BLD AUTO: 0.8 K/UL (ref 0.3–1)
MONOCYTES NFR BLD: 18.8 % (ref 4–15)
NEUTROPHILS # BLD AUTO: 2 K/UL (ref 1.8–7.7)
NEUTROPHILS NFR BLD: 47.9 % (ref 38–73)
NRBC BLD-RTO: 0 /100 WBC
PHOSPHATE SERPL-MCNC: 2.5 MG/DL (ref 2.7–4.5)
PLATELET # BLD AUTO: 187 K/UL (ref 150–450)
PMV BLD AUTO: 10.6 FL (ref 9.2–12.9)
POTASSIUM SERPL-SCNC: 3.9 MMOL/L (ref 3.5–5.1)
PROTHROMBIN TIME: 19.5 SEC (ref 9–12.5)
RBC # BLD AUTO: 4.66 M/UL (ref 4.6–6.2)
SODIUM SERPL-SCNC: 134 MMOL/L (ref 136–145)
WBC # BLD AUTO: 4.2 K/UL (ref 3.9–12.7)

## 2025-03-08 PROCEDURE — 80048 BASIC METABOLIC PNL TOTAL CA: CPT

## 2025-03-08 PROCEDURE — 94761 N-INVAS EAR/PLS OXIMETRY MLT: CPT

## 2025-03-08 PROCEDURE — 85610 PROTHROMBIN TIME: CPT

## 2025-03-08 PROCEDURE — 25000003 PHARM REV CODE 250

## 2025-03-08 PROCEDURE — 84100 ASSAY OF PHOSPHORUS: CPT

## 2025-03-08 PROCEDURE — 85025 COMPLETE CBC W/AUTO DIFF WBC: CPT

## 2025-03-08 PROCEDURE — 25000003 PHARM REV CODE 250: Performed by: STUDENT IN AN ORGANIZED HEALTH CARE EDUCATION/TRAINING PROGRAM

## 2025-03-08 PROCEDURE — 36415 COLL VENOUS BLD VENIPUNCTURE: CPT

## 2025-03-08 PROCEDURE — 27000248 HC VAD-ADDITIONAL DAY

## 2025-03-08 PROCEDURE — 85730 THROMBOPLASTIN TIME PARTIAL: CPT

## 2025-03-08 PROCEDURE — 83615 LACTATE (LD) (LDH) ENZYME: CPT

## 2025-03-08 PROCEDURE — 99223 1ST HOSP IP/OBS HIGH 75: CPT | Mod: ,,, | Performed by: INTERNAL MEDICINE

## 2025-03-08 PROCEDURE — 83735 ASSAY OF MAGNESIUM: CPT

## 2025-03-08 RX ORDER — DOXYCYCLINE 100 MG/1
100 CAPSULE ORAL 2 TIMES DAILY
Qty: 60 CAPSULE | Refills: 0 | Status: SHIPPED | OUTPATIENT
Start: 2025-03-08

## 2025-03-08 RX ADMIN — Medication 324 MG: at 08:03

## 2025-03-08 RX ADMIN — AMIODARONE HYDROCHLORIDE 400 MG: 200 TABLET ORAL at 08:03

## 2025-03-08 RX ADMIN — OMEGA-3-ACID ETHYL ESTERS 2 G: 1 CAPSULE, LIQUID FILLED ORAL at 08:03

## 2025-03-08 RX ADMIN — MEXILETINE HYDROCHLORIDE 250 MG: 250 CAPSULE ORAL at 05:03

## 2025-03-08 RX ADMIN — PANTOPRAZOLE SODIUM 40 MG: 40 TABLET, DELAYED RELEASE ORAL at 01:03

## 2025-03-08 RX ADMIN — LEVOTHYROXINE SODIUM 125 MCG: 25 TABLET ORAL at 05:03

## 2025-03-08 RX ADMIN — AMLODIPINE BESYLATE 10 MG: 10 TABLET ORAL at 08:03

## 2025-03-08 NOTE — DISCHARGE SUMMARY
[facility]  Heart Transplant  Discharge Summary      Patient Name: Tim Richards  MRN: [id number]  Admission Date: 3/6/2025  Hospital Length of Stay: 2 days  Discharge Date and Time: 03/08/2025 11:13 AM  Attending Physician: Gaurav Rodriguez MD   Discharging Provider: Shivani Dickinson MD  Primary Care Provider: Diego Daniel MD     HPI: Mr. Richards is a 58 yo black male with stage D HFrEF who was previously supported with a HM2 (implanted 3/8/2018 as DT-VAD) but required pump exchange (HM3 pump exchange 7/13/2022) for thrombosis of pump post COVID-19 PNA and AHRF. His post-op course following pump exchange was prolonged and complicated by worsening cardiogenic shock/RV failure requiring VA-ECMO. He also had recurrent VT as well as atrial flutter with RVR and is on chronic amiodarone and mexiletine. In June/July 2022 he was tapered of steroids (on for amiodarone hyperthyroid) due to concern for avascular necrosis of hip. 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 He completed course of ertapenem and voriconazole.     He reached out to the heart failure clinic in regards to his alcohol problem and wanted help with detox as most facilities wouldn't accept a patient with an lvad. Patient reports correct Warfarin dose, pt fell, hit his head and twisted his knee on 3/4/25, saw LVAD Team for the fall, has been taking Ibuprofen since the fall, had alcohol on 3/4/25, no other changes reported.     He reports drinking alcohol for about the past year and a half approximately 5 days a week drinks about a 3rd of a L bottle of liquor daily.  He reports he does not drink 1st thing in the morning occasionally he will start drinking around 3:00 p.m..  He has never  had withdrawals in the past but does report hand tremors that improve with drinking alcohol.  He reports his father was an alcoholic and him in his brother have had issues with it in the past.  He recognizes that his drinking is a problem and has resulted in multiple injuries from losing his balance and stumbling.  He reports his last drink was Tuesday.  He denies any anxiety, tremors, or hallucinations.  On Tuesday he was drinking and lost his balance and fell and his wife reports trying to get up multiple times it continued to fall and fell and hit his face on the TV stand also have a right knee injury with swelling at the proximal area of the joint.  He reports he is able to bear weight but has been using a cane to walk.  He walked here from [employer] for without issue.  He has been taking his medications as prescribed.  He reports some ongoing anxiety especially related to getting shocked by his ICD.  He was last shocked 4-1/2 months ago.    * No surgery found *     Hospital Course: Addiction psych was consulted and abstinence counseling was provided discussed various options for medication/therapies to assist which will be discussed and initiated on an outpatient basis. Patient with the support of his wife are committed to plan to stop drinking and psych has reached out to pts outpatient psychiatrist whom he will follow up with. No s/s of withdrawal while inpatient. Resumed home xanax for as needed anxiety. Drive line culture was obtained, some drainage noted, ID recommended 30 days of doxycycline and he has follow up with them in clinic this month scheduled. He will follow up with AC clinic and HF clinic. LFTs were noted to be elevated on admission and U/S abdomen showing hepatic steatosis. Recommended continued abstinence from alcohol, weight loss and exercise. CTH obtain as pt has a orbital contusion from falling while intoxicated earlier in the week, negative for acute intracranial abnormality. Knee XR  obtained as his knee was swollen after falling, negative for fracture however noted suprapatellar bursal effusion consistent with exam, swelling improving, able to bear weight and worked with PT while inpatient.     Goals of Care Treatment Preferences:  Code Status: Full Code    Health care agent: Wife is CHAVEZ Mendoza  Health care agent number: No value filed.                   Consults (From admission, onward)          Status Ordering Provider     Inpatient consult to Infectious Diseases  Once        Provider:  (Not yet assigned)    Completed DEBBIE ARAUJO     Inpatient consult to Psychiatry  Once        Provider:  (Not yet assigned)    Completed ALYSIA MENDEZ     Inpatient consult to Registered Dietitian/Nutritionist  Once        Provider:  (Not yet assigned)    Completed ALYSIA MENDEZ            Significant Diagnostic Studies: N/A    Pending Diagnostic Studies:       None          Final Active Diagnoses:    Diagnosis Date Noted POA    PRINCIPAL PROBLEM:  Alcohol use disorder [F10.90] 05/23/2014 Yes     Chronic    Alcohol withdrawal, uncomplicated [F10.930] 03/07/2025 Yes    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] 03/08/2025 Yes    Fall [W19.XXXA] 03/06/2025 Yes    Anticoagulation monitoring, INR range 2-3 [Z79.01] 08/28/2022 Not Applicable    Depression with anxiety [F41.8] 08/13/2022 Yes     Chronic    History of ventricular tachycardia [Z86.79]  Not Applicable    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  Medications:  Reconciled  Home Medications:      Medication List        CONTINUE taking these medications      ALPRAZolam 1 MG tablet  Commonly known as: XANAX  Take 1 tablet (1 mg total) by mouth 2 (two) times daily as needed for Anxiety.     amiodarone 200 MG Tab  Commonly known as: PACERONE  Take 2 tablets (400 mg total) by mouth once daily.     amLODIPine 10 MG tablet  Commonly known as: NORVASC  Take 1 tablet (10 mg total) by mouth once daily.     doxycycline 100 MG Cap  Commonly known as: VIBRAMYCIN  Take 1 capsule (100 mg total) by mouth 2 (two) times daily.     ferrous gluconate 324 mg (37.5 mg iron) Tab tablet  TAKE 1 TABLET BY MOUTH 2 TIMES DAILY WITH MEALS.     * levothyroxine 125 MCG tablet  Commonly known as: SYNTHROID  Take 1 tablet (125 mcg total) by mouth before breakfast.     * levothyroxine 125 MCG tablet  Commonly known as: SYNTHROID  Take 1 tablet (125 mcg total) by mouth before breakfast.     magnesium oxide 400 mg (241.3 mg magnesium) tablet  Commonly known as: MAG-OX  Take 1 tablet (400 mg total) by mouth once daily.     mexiletine 250 MG Cap  Commonly known as: MEXITIL  Take 1 capsule (250 mg total) by mouth every 8 (eight) hours.     mirtazapine 30 MG tablet  Commonly known as: REMERON  Take 1 tablet (30 mg total) by mouth every evening.     omega-3 acid ethyl esters 1 gram capsule  Commonly known as: LOVAZA  Take 2 capsules (2 g total) by mouth 2 (two) times daily.     pantoprazole 40 MG tablet  Commonly known as: PROTONIX  Take 1 tablet (40 mg total) by mouth daily with lunch.     potassium chloride SA 20 MEQ tablet  Commonly known as: K-DUR,KLOR-CON  TAKE 1 TABLET WITH EVERY LASIX 40 MG DOSE     torsemide 20 MG Tab  Commonly known as: DEMADEX  Take 1 tablets (20mg) on Mondays.     warfarin 5 MG tablet  Commonly known as: COUMADIN  Take 1-1.5 tablets (5-7.5 mg total) by mouth Daily. As directed by the Coumadin Clinic     zolpidem 12.5 MG CR tablet  Commonly known as: AMBIEN CR  TAKE 1 TABLET (12.5 MG TOTAL) BY  MOUTH NIGHTLY AS NEEDED FOR INSOMNIA.           * This list has 2 medication(s) that are the same as other medications prescribed for you. Read the directions carefully, and ask your doctor or other care provider to review them with you.                ASK your doctor about these medications      cyanocobalamin 1,000 mcg/mL injection  INJECT 1 ML (1,000 MCG TOTAL) INTO THE SKIN ONCE A WEEK. FOR 24 DOSES              Shivani Dickinson MD  Heart Transplant  [facility]

## 2025-03-08 NOTE — PLAN OF CARE
Problem: Adult Inpatient Plan of Care  Goal: Plan of Care Review  Outcome: Met  Goal: Patient-Specific Goal (Individualized)  Outcome: Met  Goal: Absence of Hospital-Acquired Illness or Injury  Outcome: Met  Goal: Optimal Comfort and Wellbeing  Outcome: Met  Goal: Readiness for Transition of Care  Outcome: Met     Problem: Fall Injury Risk  Goal: Absence of Fall and Fall-Related Injury  Outcome: Met     Problem: Ventricular Assist Device  Goal: Optimal Adjustment to Device  Outcome: Met  Goal: Absence of Bleeding  Outcome: Met  Goal: Absence of Embolism Signs and Symptoms  Outcome: Met  Goal: Optimal Blood Flow  Outcome: Met  Goal: Absence of Infection Signs and Symptoms  Outcome: Met  Goal: Effective Right-Sided Heart Function  Outcome: Met

## 2025-03-08 NOTE — PLAN OF CARE
Problem: Adult Inpatient Plan of Care  Goal: Plan of Care Review  Outcome: Progressing  Goal: Patient-Specific Goal (Individualized)  Outcome: Progressing  Goal: Absence of Hospital-Acquired Illness or Injury  Outcome: Progressing  Goal: Optimal Comfort and Wellbeing  Outcome: Progressing  Goal: Readiness for Transition of Care  Outcome: Progressing     Problem: Fall Injury Risk  Goal: Absence of Fall and Fall-Related Injury  Outcome: Progressing     Problem: Ventricular Assist Device  Goal: Optimal Adjustment to Device  Outcome: Progressing  Goal: Absence of Bleeding  Outcome: Progressing  Goal: Absence of Embolism Signs and Symptoms  Outcome: Progressing  Goal: Optimal Blood Flow  Outcome: Progressing  Goal: Absence of Infection Signs and Symptoms  Outcome: Progressing  Goal: Effective Right-Sided Heart Function  Outcome: Progressing

## 2025-03-08 NOTE — ASSESSMENT & PLAN NOTE
I have reviewed hospital notes from  [facility] service and other specialty providers. I have also reviewed CBC, CMP/BMP,  cultures and imaging with my interpretation as documented.     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 Culture NGTD so far.   Start doxycycline 100 mg PO BID for 30 days.  Follow up appointment as established on 3/26.  Discussed management plan with the staff and/or members from [facility] service.

## 2025-03-08 NOTE — SUBJECTIVE & OBJECTIVE
Past Medical History:   Diagnosis Date    A-fib     Anticoagulant long-term use     Atrial flutter 7/30/2022    CHF (congestive heart failure)     Class 1 obesity due to excess calories with serious comorbidity and body mass index (BMI) of 31.0 to 31.9 in adult     Class 1 obesity due to excess calories with serious comorbidity and body mass index (BMI) of 32.0 to 32.9 in adult     Dilated cardiomyopathy 1/10/2018    Disorder of kidney and ureter     CKD    Encounter for blood transfusion     Essential hypertension 8/28/2022    Gout     HTN (hypertension)     Hx of psychiatric care     ICD (implantable cardioverter-defibrillator) infection 7/1/2020    Psychiatric problem     Thyroid disease     Ventricular tachycardia (paroxysmal)        Past Surgical History:   Procedure Laterality Date    AORTIC VALVULOPLASTY N/A 07/13/2022    Procedure: REPAIR, AORTIC VALVE;  Surgeon: Yg Kaufman MD;  Location: [facility] OR [address] [place] [address];  Service: Cardiovascular;  Laterality: N/A;    APPLICATION OF WOUND VACUUM-ASSISTED CLOSURE DEVICE N/A 07/15/2022    Procedure: APPLICATION, WOUND VAC;  Surgeon: Yg Kaufman MD;  [facility];  Service: Cardiovascular;  Laterality: N/A;  50 x 5 cm     APPLICATION OF WOUND VACUUM-ASSISTED CLOSURE DEVICE Right 09/27/2022    Procedure: APPLICATION, WOUND VAC;  Surgeon: Kole Tabares MD;  Location: [facility] OR [address] [place] [address];  Service: Plastics;  Laterality: Right;    CARDIAC CATHETERIZATION  12/2012    CARDIAC DEFIBRILLATOR PLACEMENT Left     CRRT-D    CARDIAC VALVE REPLACEMENT  03/08/2018    LVAD Implant    COLONOSCOPY N/A 03/06/2018    Procedure: COLONOSCOPY;  Surgeon: Alonso Bone MD;  Location: [address]);  Service: Endoscopy;  Laterality: N/A;    COLONOSCOPY N/A 07/17/2019    Procedure: COLONOSCOPY;  Surgeon: Blane Valdez MD;  [facility]  Service: Endoscopy;  Laterality: N/A;    COLONOSCOPY N/A 07/18/2019    Procedure: COLONOSCOPY;  Surgeon: Blane Valdez MD;   Location: [facility] ENDO (2ND FLR);  Service: Endoscopy;  Laterality: N/A;    CREATION OF ILIOFEMORAL ARTERY BYPASS Right 09/27/2022    Procedure: CREATION, BYPASS, ARTERIAL, ILIAC TO FEMORAL WITH GRAFT;  Surgeon: Zach Hernández MD;  [facility];  Service: Peripheral Vascular;  Laterality: Right;    CREATION OF MUSCLE ROTATIONAL FLAP Right 09/27/2022    Procedure: CREATION, FLAP, MUSCLE ROTATION, SARTORIUS AND RECTUS FEMORIS;  Surgeon: Kole Tabares MD;  [facility];  Service: Plastics;  Laterality: Right;    ESOPHAGOGASTRODUODENOSCOPY N/A 07/17/2019    Procedure: EGD (ESOPHAGOGASTRODUODENOSCOPY);  Surgeon: Blane Valdez MD;  Location: [facility] ENDO (2ND FLR);  Service: Endoscopy;  Laterality: N/A;    ESOPHAGOGASTRODUODENOSCOPY N/A 07/18/2019    Procedure: EGD (ESOPHAGOGASTRODUODENOSCOPY);  Surgeon: Blane Valdez MD;  Location: [facility] ENDO (2ND FLR);  Service: Endoscopy;  Laterality: N/A;    FOREIGN BODY REMOVAL N/A 07/22/2022    Procedure: REMOVAL, FOREIGN BODY;  Surgeon: Yg Kaufman MD;  [facility];  Service: Cardiovascular;  Laterality: N/A;  LVAD Heartmate 2 drive line removal    INSERTION OF GRAFT TO PERICARDIUM N/A 07/15/2022    Procedure: INSERTION, GRAFT, PERICARDIUM;  Surgeon: Yg Kaufman MD;  [facility];  Service: Cardiovascular;  Laterality: N/A;    IRRIGATION OF MEDIASTINUM N/A 07/15/2022    Procedure: IRRIGATION, MEDIASTINUM;  Surgeon: Yg Kaufman MD;  [facility];  Service: Cardiovascular;  Laterality: N/A;    LYSIS OF ADHESIONS  07/13/2022    Procedure: LYSIS, ADHESIONS;  Surgeon: Yg Kaufman MD;  [facility];  Service: Cardiovascular;;    NONINVASIVE CARDIAC ELECTROPHYSIOLOGY STUDY N/A 10/18/2019    Procedure: CARDIAC ELECTROPHYSIOLOGY STUDY, NONINVASIVE;  Surgeon: Raz Wagner MD;  Location: [facility] EP LAB;  Service: Cardiology;  Laterality: N/A;  VT, DFTs, MDT CRTD in situ, LVAD, LASHELL pizarro, 5936     REPLACEMENT OF IMPLANTABLE CARDIOVERTER-DEFIBRILLATOR (ICD) GENERATOR N/A 03/09/2020    Procedure: REPLACEMENT, ICD GENERATOR;  Surgeon: Harry Yun MD;  Location: [facility] EP LAB;  Service: Cardiology;  Laterality: N/A;  VT, ICD Gen Change and Lead Revision, MDT, MAC, DM,3 Prep    REPLACEMENT OF LEFT VENTRICULAR ASSIST DEVICE (LVAD)  07/13/2022    Procedure: REPLACEMENT, LVAD;  Surgeon: Yg Kaufman MD;  [address]  Service: Cardiovascular;;    REPLACEMENT OF PUMP N/A 07/13/2022    Procedure: REPLACEMENT, PUMP;  Surgeon: Yg Kaufman MD;  [facility];  Service: Cardiovascular;  Laterality: N/A;  LVAD pump exchange  EXPLANATION OF HEATMATE 2  IMPLANTATION OF HEARTMATE 3  IMPLANTATION OF 8MM CHIMNEY GRAFT TO RFA  INITIATION OF ECMO  TEMPORARY CLOSURE OF CHEST    REVISION OF IMPLANTABLE CARDIOVERTER-DEFIBRILLATOR (ICD) ELECTRODE LEAD PLACEMENT N/A 03/09/2020    Procedure: REVISION, INSERTION, ELECTRODE LEAD, ICD;  Surgeon: Harry Yun MD;  Location: [facility] EP LAB;  Service: Cardiology;  Laterality: N/A;  VT, ICD Gen Change and Lead Revision, MDT, MAC, DM,3 Prep    RIGHT HEART CATHETERIZATION Right 09/08/2023    Procedure: INSERTION, CATHETER, RIGHT HEART;  Surgeon: Gaurav Rodriguez MD;  Location: [facility] CATH LAB;  Service: Cardiology;  Laterality: Right;    STERNAL WOUND CLOSURE N/A 07/14/2022    Procedure: CLOSURE, WOUND, STERNUM;  Surgeon: Yg Kaufman MD;  [address]  Service: Cardiovascular;  Laterality: N/A;  temporary closure  evacuation of hematoma    STERNAL WOUND CLOSURE N/A 07/15/2022    Procedure: CLOSURE, WOUND, STERNUM;  Surgeon: Yg Kaufman MD;  [facility];  Service: Cardiovascular;  Laterality: N/A;    TRACHEOSTOMY N/A 08/04/2022    Procedure: CREATION, TRACHEOSTOMY;  Surgeon: Germain Holt MD;  Location: [facility] OR [address] [place] [address];  Service: General;  Laterality: N/A;    TREATMENT OF CARDIAC ARRHYTHMIA  10/18/2019    Procedure:  Cardioversion or Defibrillation;  Surgeon: Raz Wagner MD;  Location: [facility] EP LAB;  Service: Cardiology;;       Review of patient's allergies indicates:   Allergen Reactions    Lisinopril Anaphylaxis    Hydralazine     Hydralazine analogues      Chronic constipation, impotence, dizziness       Medications:  No medications prior to admission.     Antibiotics (From admission, onward)      Start     Stop Route Frequency Ordered    25 2300  mupirocin 2 % ointment         25 Nasl 2 times daily 25 214          Antifungals (From admission, onward)      None          Antivirals (From admission, onward)      None             Immunization History   Administered Date(s) Administered    COVID-19, MRNA, LN-S, PF (MODERNA FULL 0.5 ML DOSE) 2021, 2021    COVID-19, MRNA, LN-S, PF (Pfizer) (Purple Cap) 2021    Hepatitis A / Hepatitis B 2018    Influenza 2014, 2019    Influenza - Quadrivalent - PF *Preferred* (6 months and older) 2014, 2015, 2016, 10/05/2017, 2021    Influenza - Trivalent - PF (PED) 2014    Pneumococcal Conjugate - 13 Valent 2014    Pneumococcal Polysaccharide - 23 Valent 10/01/2015, 2016    Tdap 2018       Family History       Problem Relation (Age of Onset)    Cancer Sister (54)    Coronary artery disease Father    Diabetes Father    Heart disease Father    Hypertension Father    No Known Problems Mother, Brother          Social History     Socioeconomic History    Marital status:     Number of children: 3   Occupational History     Employer: [employer]    Tobacco Use    Smoking status: Former     Current packs/day: 0.00     Types: Vaping w/o nicotine, Cigarettes     Start date: 2018     Quit date: 2018     Years since quittin.1     Passive exposure: Never    Smokeless tobacco: Never    Tobacco comments:     pt is quiting on his own - pt stated not qualified for program;   pt  quit on his own   Substance and Sexual Activity    Alcohol use: Never     Comment: quit    Drug use: Never    Sexual activity: Not Currently     Partners: Female     Birth control/protection: None     Comment: 10/5/17  with same partner 7 years    Social History Narrative    Disabled     Social Drivers of Health     Financial Resource Strain: Low Risk  (3/6/2025)    Overall Financial Resource Strain (CARDIA)     Difficulty of Paying Living Expenses: Not hard at all   Food Insecurity: No Food Insecurity (3/6/2025)    Hunger Vital Sign     Worried About Running Out of Food in the Last Year: Never true     Ran Out of Food in the Last Year: Never true   Transportation Needs: Patient Declined (1/16/2024)    PRAPARE - Transportation     Lack of Transportation (Medical): Patient declined     Lack of Transportation (Non-Medical): Patient declined   Physical Activity: Insufficiently Active (1/16/2024)    Exercise Vital Sign     Days of Exercise per Week: 3 days     Minutes of Exercise per Session: 20 min   Stress: No Stress Concern Present (3/6/2025)    [employer] - Occupational Stress Questionnaire     Feeling of Stress : Only a little   Housing Stability: Low Risk  (3/6/2025)    Housing Stability Vital Sign     Unable to Pay for Housing in the Last Year: No     Homeless in the Last Year: No     Review of Systems   Constitutional:  Negative for chills, fatigue and fever.   HENT:  Negative for ear pain, mouth sores, nosebleeds, postnasal drip, rhinorrhea, sinus pressure, sore throat, tinnitus, trouble swallowing and voice change.    Eyes:  Negative for photophobia, pain, redness and visual disturbance.   Respiratory:  Negative for apnea, cough, chest tightness, shortness of breath and wheezing.    Cardiovascular:  Negative for chest pain, palpitations and leg swelling.   Gastrointestinal:  Negative for abdominal pain, blood in stool, constipation, diarrhea, nausea and vomiting.    Endocrine: Negative for cold intolerance, heat intolerance, polydipsia and polyuria.   Genitourinary:  Negative for decreased urine volume, difficulty urinating, dysuria, flank pain, frequency, genital sores, hematuria, penile discharge, penile pain, penile swelling, scrotal swelling, testicular pain and urgency.   Musculoskeletal:  Negative for arthralgias, back pain, joint swelling, myalgias and neck pain.   Skin:  Negative for color change and rash.   Allergic/Immunologic: Negative for environmental allergies and food allergies.   Neurological:  Negative for dizziness, seizures, syncope, weakness, light-headedness, numbness and headaches.   Hematological:  Negative for adenopathy. Does not bruise/bleed easily.   Psychiatric/Behavioral:  Negative for agitation, confusion, decreased concentration, hallucinations, self-injury, sleep disturbance and suicidal ideas. The patient is not nervous/anxious.      Objective:     Vital Signs (Most Recent):  Temp: 98.4 °F (36.9 °C) (03/08/25 1200)  Pulse: (!) 59 (03/08/25 1200)  Resp: 18 (03/08/25 0725)  BP: (!) 80/0 (03/08/25 1200)  SpO2: (!) 94 % (03/08/25 1200) Vital Signs (24h Range):  Temp:  [97.4 °F (36.3 °C)-98.4 °F (36.9 °C)] 98.4 °F (36.9 °C)  Pulse:  [59-70] 59  Resp:  [18-19] 18  SpO2:  [93 %-98 %] 94 %  BP: (70-84)/(0) 80/0     Weight: 104.6 kg (230 lb 9.6 oz)  Body mass index is 30.42 kg/m².    Estimated Creatinine Clearance: 39.3 mL/min (A) (based on SCr of 2.6 mg/dL (H)).     Physical Exam  Vitals and nursing note reviewed.   Constitutional:       Appearance: He is well-developed.   HENT:      Head: Normocephalic and atraumatic.     Eyes:      General: No scleral icterus.        Right eye: No discharge.         Left eye: No discharge.      Conjunctiva/sclera: Conjunctivae normal.   Pulmonary:      Effort: Pulmonary effort is normal.   Abdominal:       Musculoskeletal:         General: Normal range of motion.   Skin:     General: Skin is warm and dry.    Neurological:      Mental Status: He is alert and oriented to person, place, and time.   Psychiatric:         Behavior: Behavior normal.         Thought Content: Thought content normal.         Judgment: Judgment normal.          Significant Labs: CBC:   Recent Labs   Lab 03/07/25 0459 03/08/25 0521   WBC 3.83* 4.20   HGB 13.9* 13.5*   HCT 44.0 43.3    187     CMP:   Recent Labs   Lab 03/07/25 0459 03/08/25 0521    134*   K 3.6 3.9   CL 99 100   CO2 27 24   GLU 84 103   BUN 30* 29*   CREATININE 2.7* 2.6*   CALCIUM 9.5 9.6   PROT 8.0  --    ALBUMIN 3.6  --    BILITOT 0.6  --    ALKPHOS 97  --    *  --    *  --    ANIONGAP 10 10     Microbiology Results (last 7 days)       Procedure Component Value Units Date/Time    Culture, Anaerobe [[id number]] Collected: 03/07/25 1549    Order Status: Completed Specimen: Wound from Abdomen Updated: 03/08/25 1421     Anaerobic Culture Culture in progress    Narrative:      DLES    Aerobic culture [[id number]] Collected: 03/07/25 1549    Order Status: Completed Specimen: Wound from Abdomen Updated: 03/08/25 0718     Aerobic Bacterial Culture No growth    Narrative:      DLELLE    Gram stain [[id number]] Collected: 03/07/25 1549    Order Status: Completed Specimen: Wound from Abdomen Updated: 03/07/25 1843     Gram Stain Result Rare WBC's      No organisms seen    Narrative:      DLES    AFB Culture & Smear [[id number]] Collected: 03/07/25 1549    Order Status: Sent Specimen: Wound from Abdomen Updated: 03/07/25 1558    Fungus culture [[id number]] Collected: 03/07/25 1549    Order Status: Sent Specimen: Wound from Abdomen Updated: 03/07/25 1557            Significant Imaging: I have reviewed all pertinent imaging results/findings within the past 24 hours.

## 2025-03-08 NOTE — HPI
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 He was noted to have yellow drainage at his driveline exit site which the patient reports is chronic.    Infectious Disease consulted for "DLES possible infection, cultured by VC on 3/7/25"      "

## 2025-03-08 NOTE — HOSPITAL COURSE
HPI: Mr. Richards is a 60 yo black male with stage D HFrEF who was previously supported with a HM2 (implanted 3/8/2018 as DT-VAD) but required pump exchange (HM3 pump exchange 7/13/2022) for thrombosis of pump post COVID-19 PNA and AHRF. His post-op course following pump exchange was prolonged and complicated by worsening cardiogenic shock/RV failure requiring VA-ECMO. He also had recurrent VT as well as atrial flutter with RVR and is on chronic amiodarone and mexiletine. In June/July 2022 he was tapered of steroids (on for amiodarone hyperthyroid) due to concern for avascular necrosis of hip. 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 He completed course of ertapenem and voriconazole.      He reached out to the heart failure clinic in regards to his alcohol problem and wanted help with detox as most facilities wouldn't accept a patient with an lvad. Patient reports correct Warfarin dose, pt fell, hit his head and twisted his knee on 3/4/25, saw LVAD Team for the fall, has been taking Ibuprofen since the fall, had alcohol on 3/4/25, no other changes reported.      He reports drinking alcohol for about the past year and a half approximately 5 days a week drinks about a 3rd of a L bottle of liquor daily.  He reports he does not drink 1st thing in the morning occasionally he will start drinking around 3:00 p.m..  He has never had withdrawals in the past but does report hand tremors that improve with drinking alcohol.  He reports his father was an alcoholic and him in his brother have had issues with it in the past.  He recognizes that his drinking is a problem and has resulted in multiple injuries from losing his balance and stumbling.  He reports his last drink was  Tuesday.  He denies any anxiety, tremors, or hallucinations.  On Tuesday he was drinking and lost his balance and fell and his wife reports trying to get up multiple times it continued to fall and fell and hit his face on the TV stand also have a right knee injury with swelling at the proximal area of the joint.  He reports he is able to bear weight but has been using a cane to walk.  He walked here from parking lot for without issue.  He has been taking his medications as prescribed.  He reports some ongoing anxiety especially related to getting shocked by his ICD.  He was last shocked 4-1/2 months ago.    Hospital course: Addiction psych was consulted and abstinence counseling was provided. Patient and his wife are committed to plan to stop drinking and psych has reached out to pts outpatient psychiatrist whom he will follow up with. No s/s of withdrawal while inpatient. Resumed home xanax. Drive line culture was obtained, some drainage noted, ID recommended 30 days of doxycycline and he has follow up with them in clinic this month scheduled. He will follow up with AC clinic and HF clinic. LFTs were noted to be elevated on admission and U/S abdomen showing hepatic steatosis. Recommended continued abstinence from alcohol, weight loss and exercise.

## 2025-03-08 NOTE — CONSULTS
[facility] - Cardiac Intensive Care  Infectious Disease  Consult Note    Patient Name: Tim Richards  MRN: [id number]  Admission Date: 3/6/2025  Hospital Length of Stay: 2 days  Attending Physician: Gaurav Rodriguez MD  Primary Care Provider: Diego Daniel MD     Isolation Status: No active isolations    Patient information was obtained from patient, past medical records, and ER records.      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 service and other specialty providers. I have also reviewed CBC, CMP/BMP,  cultures and imaging with my interpretation as documented.     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 Culture NGTD so far.   Start doxycycline 100 mg PO BID for 30 days.  Follow up appointment as established on 3/26.  Discussed management plan with the staff and/or members from [facility] service.          Thank you for your consult. I will sign off. Please contact us if you have any additional questions.    Roxie Garg MD  Infectious Disease  [facility] - Cardiac Intensive Care    75 minutes of total time spent on the encounter, which includes face to face time and non-face to face time preparing to see the patient (eg, review of tests), obtaining and/or reviewing separately obtained history, documenting clinical information in the electronic or other health record, independently interpreting results (not separately reported) and communicating results to the patient/family/caregiver, or care coordination (not separately reported).     Subjective:     Principal Problem: <principal problem not specified>    HPI: A 59-year-old man with HFrEF, s/p VAD HM2 in 2018, s/p VAD pump exchange to HM3 (July 2022), right groin mycotic aneurysm s/p explant of prior graft  "with creation of ileofem bypass with rif soaked dacron graft/muscle flap (September 2022), prolonged course of ertapenem plus voriconazole who was admitted for management of alcohol use. He was noted to have yellow drainage at his driveline exit site which the patient reports is chronic.    Infectious Disease consulted for "DLES possible infection, cultured by VC on 3/7/25"        Past Medical History:   Diagnosis Date    A-fib     Anticoagulant long-term use     Atrial flutter 7/30/2022    CHF (congestive heart failure)     Class 1 obesity due to excess calories with serious comorbidity and body mass index (BMI) of 31.0 to 31.9 in adult     Class 1 obesity due to excess calories with serious comorbidity and body mass index (BMI) of 32.0 to 32.9 in adult     Dilated cardiomyopathy 1/10/2018    Disorder of kidney and ureter     CKD    Encounter for blood transfusion     Essential hypertension 8/28/2022    Gout     HTN (hypertension)     Hx of psychiatric care     ICD (implantable cardioverter-defibrillator) infection 7/1/2020    Psychiatric problem     Thyroid disease     Ventricular tachycardia (paroxysmal)        Past Surgical History:   Procedure Laterality Date    AORTIC VALVULOPLASTY N/A 07/13/2022    Procedure: REPAIR, AORTIC VALVE;  Surgeon: Yg Kaufman MD;  Location: [facility] OR [address] [place] [address];  Service: Cardiovascular;  Laterality: N/A;    APPLICATION OF WOUND VACUUM-ASSISTED CLOSURE DEVICE N/A 07/15/2022    Procedure: APPLICATION, WOUND VAC;  Surgeon: Yg Kaufman MD;  Location: [facility] OR [address] [place] [address];  Service: Cardiovascular;  Laterality: N/A;  50 x 5 cm     APPLICATION OF WOUND VACUUM-ASSISTED CLOSURE DEVICE Right 09/27/2022    Procedure: APPLICATION, WOUND VAC;  Surgeon: Kole Tabares MD;  Location: [facility] OR [address] [place] [address];  Service: Plastics;  Laterality: Right;    CARDIAC CATHETERIZATION  12/2012    CARDIAC DEFIBRILLATOR PLACEMENT Left     CRRT-D    CARDIAC VALVE REPLACEMENT  03/08/2018    LVAD Implant    " COLONOSCOPY N/A 03/06/2018    Procedure: COLONOSCOPY;  Surgeon: Alonso Bone MD;  Location: [facility] ENDO (2ND FLR);  Service: Endoscopy;  Laterality: N/A;    COLONOSCOPY N/A 07/17/2019    Procedure: COLONOSCOPY;  Surgeon: Blane Valdez MD;  Location: [facility] ENDO (2ND FLR);  Service: Endoscopy;  Laterality: N/A;    COLONOSCOPY N/A 07/18/2019    Procedure: COLONOSCOPY;  Surgeon: Blane Valdez MD;  Location: [facility] ENDO (2ND FLR);  Service: Endoscopy;  Laterality: N/A;    CREATION OF ILIOFEMORAL ARTERY BYPASS Right 09/27/2022    Procedure: CREATION, BYPASS, ARTERIAL, ILIAC TO FEMORAL WITH GRAFT;  Surgeon: Zach Hernández MD;  Location: [facility] OR 2ND FLR;  Service: Peripheral Vascular;  Laterality: Right;    CREATION OF MUSCLE ROTATIONAL FLAP Right 09/27/2022    Procedure: CREATION, FLAP, MUSCLE ROTATION, SARTORIUS AND RECTUS FEMORIS;  Surgeon: Kole Tabares MD;  Location: [facility] OR 2ND FLR;  Service: Plastics;  Laterality: Right;    ESOPHAGOGASTRODUODENOSCOPY N/A 07/17/2019    Procedure: EGD (ESOPHAGOGASTRODUODENOSCOPY);  Surgeon: Blane Valdez MD;  Location: [facility] ENDO (2ND FLR);  Service: Endoscopy;  Laterality: N/A;    ESOPHAGOGASTRODUODENOSCOPY N/A 07/18/2019    Procedure: EGD (ESOPHAGOGASTRODUODENOSCOPY);  Surgeon: Blane Valdez MD;  Location: [facility] ENDO (2ND FLR);  Service: Endoscopy;  Laterality: N/A;    FOREIGN BODY REMOVAL N/A 07/22/2022    Procedure: REMOVAL, FOREIGN BODY;  Surgeon: Yg Kaufman MD;  Location: [facility] OR 2ND FLR;  Service: Cardiovascular;  Laterality: N/A;  LVAD Heartmate 2 drive line removal    INSERTION OF GRAFT TO PERICARDIUM N/A 07/15/2022    Procedure: INSERTION, GRAFT, PERICARDIUM;  Surgeon: Yg Kaufman MD;  Location: [facility] OR 2ND FLR;  Service: Cardiovascular;  Laterality: N/A;    IRRIGATION OF MEDIASTINUM N/A 07/15/2022    Procedure: IRRIGATION, MEDIASTINUM;  Surgeon: Yg Kaufman MD;  Location: [facility] OR 2ND FLR;  Service: Cardiovascular;  Laterality: N/A;    LYSIS OF ADHESIONS  07/13/2022     Procedure: LYSIS, ADHESIONS;  Surgeon: Yg Kaufman MD;  Location: [facility] OR [address] [place] [address];  Service: Cardiovascular;;    NONINVASIVE CARDIAC ELECTROPHYSIOLOGY STUDY N/A 10/18/2019    Procedure: CARDIAC ELECTROPHYSIOLOGY STUDY, NONINVASIVE;  Surgeon: Raz Wagner MD;  Location: [facility] EP LAB;  Service: Cardiology;  Laterality: N/A;  VT, DFTs, MDT CRTD in situ, LVAD, anes, MB, 3098    REPLACEMENT OF IMPLANTABLE CARDIOVERTER-DEFIBRILLATOR (ICD) GENERATOR N/A 03/09/2020    Procedure: REPLACEMENT, ICD GENERATOR;  Surgeon: Harry Yun MD;  Location: [facility] EP LAB;  Service: Cardiology;  Laterality: N/A;  VT, ICD Gen Change and Lead Revision, MDT, MAC, DM,3 Prep    REPLACEMENT OF LEFT VENTRICULAR ASSIST DEVICE (LVAD)  07/13/2022    Procedure: REPLACEMENT, LVAD;  Surgeon: Yg Kaufman MD;  Location: [facility] OR [address] [place] [address];  Service: Cardiovascular;;    REPLACEMENT OF PUMP N/A 07/13/2022    Procedure: REPLACEMENT, PUMP;  Surgeon: Yg Kaufman MD;  [facility];  Service: Cardiovascular;  Laterality: N/A;  LVAD pump exchange  EXPLANATION OF HEATMATE 2  IMPLANTATION OF HEARTMATE 3  IMPLANTATION OF 8MM CHIMNEY GRAFT TO RFA  INITIATION OF ECMO  TEMPORARY CLOSURE OF CHEST    REVISION OF IMPLANTABLE CARDIOVERTER-DEFIBRILLATOR (ICD) ELECTRODE LEAD PLACEMENT N/A 03/09/2020    Procedure: REVISION, INSERTION, ELECTRODE LEAD, ICD;  Surgeon: Harry Yun MD;  Location: [facility] EP LAB;  Service: Cardiology;  Laterality: N/A;  VT, ICD Gen Change and Lead Revision, MDT, MAC, DM,3 Prep    RIGHT HEART CATHETERIZATION Right 09/08/2023    Procedure: INSERTION, CATHETER, RIGHT HEART;  Surgeon: Gaurav Rodriguez MD;  Location: [facility] CATH LAB;  Service: Cardiology;  Laterality: Right;    STERNAL WOUND CLOSURE N/A 07/14/2022    Procedure: CLOSURE, WOUND, STERNUM;  Surgeon: Yg Kaufman MD;  Location: [facility] OR [address] [place] [address];  Service: Cardiovascular;  Laterality: N/A;  temporary closure  evacuation of hematoma    STERNAL WOUND CLOSURE N/A  07/15/2022    Procedure: CLOSURE, WOUND, STERNUM;  Surgeon: Yg Kaufman MD;  [facility];  Service: Cardiovascular;  Laterality: N/A;    TRACHEOSTOMY N/A 08/04/2022    Procedure: CREATION, TRACHEOSTOMY;  Surgeon: Germain Holt MD;  [facility];  Service: General;  Laterality: N/A;    TREATMENT OF CARDIAC ARRHYTHMIA  10/18/2019    Procedure: Cardioversion or Defibrillation;  Surgeon: Raz Wagner MD;  Location: [facility] EP LAB;  Service: Cardiology;;       Review of patient's allergies indicates:   Allergen Reactions    Lisinopril Anaphylaxis    Hydralazine     Hydralazine analogues      Chronic constipation, impotence, dizziness       Medications:  No medications prior to admission.     Antibiotics (From admission, onward)      Start     Stop Route Frequency Ordered    03/06/25 2300  mupirocin 2 % ointment         03/11/25 2059 Nasl 2 times daily 03/06/25 2145          Antifungals (From admission, onward)      None          Antivirals (From admission, onward)      None             Immunization History   Administered Date(s) Administered    COVID-19, MRNA, LN-S, PF (MODERNA FULL 0.5 ML DOSE) 03/12/2021, 04/09/2021    COVID-19, MRNA, LN-S, PF (Pfizer) (Purple Cap) 12/04/2021    Hepatitis A / Hepatitis B 02/23/2018    Influenza 11/01/2014, 08/17/2019    Influenza - Quadrivalent - PF *Preferred* (6 months and older) 11/01/2014, 09/23/2015, 09/21/2016, 10/05/2017, 03/05/2021    Influenza - Trivalent - PF (PED) 11/01/2014    Pneumococcal Conjugate - 13 Valent 11/01/2014    Pneumococcal Polysaccharide - 23 Valent 10/01/2015, 03/24/2016    Tdap 02/23/2018       Family History       Problem Relation (Age of Onset)    Cancer Sister (54)    Coronary artery disease Father    Diabetes Father    Heart disease Father    Hypertension Father    No Known Problems Mother, Brother          Social History     Socioeconomic History    Marital status:     Number of children: 3   Occupational  History     Employer: [employer]    Tobacco Use    Smoking status: Former     Current packs/day: 0.00     Types: Vaping w/o nicotine, Cigarettes     Start date: 2018     Quit date: 2018     Years since quittin.1     Passive exposure: Never    Smokeless tobacco: Never    Tobacco comments:     pt is quiting on his own - pt stated not qualified for program;  pt  quit on his own   Substance and Sexual Activity    Alcohol use: Never     Comment: quit    Drug use: Never    Sexual activity: Not Currently     Partners: Female     Birth control/protection: None     Comment: 10/5/17  with same partner 7 years    Social History Narrative    Disabled     Social Drivers of Health     Financial Resource Strain: Low Risk  (3/6/2025)    Overall Financial Resource Strain (CARDIA)     Difficulty of Paying Living Expenses: Not hard at all   Food Insecurity: No Food Insecurity (3/6/2025)    Hunger Vital Sign     Worried About Running Out of Food in the Last Year: Never true     Ran Out of Food in the Last Year: Never true   Transportation Needs: Patient Declined (2024)    PRAPARE - Transportation     Lack of Transportation (Medical): Patient declined     Lack of Transportation (Non-Medical): Patient declined   Physical Activity: Insufficiently Active (2024)    Exercise Vital Sign     Days of Exercise per Week: 3 days     Minutes of Exercise per Session: 20 min   Stress: No Stress Concern Present (3/6/2025)    [employer] - Occupational Stress Questionnaire     Feeling of Stress : Only a little   Housing Stability: Low Risk  (3/6/2025)    Housing Stability Vital Sign     Unable to Pay for Housing in the Last Year: No     Homeless in the Last Year: No     Review of Systems   Constitutional:  Negative for chills, fatigue and fever.   HENT:  Negative for ear pain, mouth sores, nosebleeds, postnasal drip, rhinorrhea, sinus pressure, sore throat, tinnitus, trouble swallowing  and voice change.    Eyes:  Negative for photophobia, pain, redness and visual disturbance.   Respiratory:  Negative for apnea, cough, chest tightness, shortness of breath and wheezing.    Cardiovascular:  Negative for chest pain, palpitations and leg swelling.   Gastrointestinal:  Negative for abdominal pain, blood in stool, constipation, diarrhea, nausea and vomiting.   Endocrine: Negative for cold intolerance, heat intolerance, polydipsia and polyuria.   Genitourinary:  Negative for decreased urine volume, difficulty urinating, dysuria, flank pain, frequency, genital sores, hematuria, penile discharge, penile pain, penile swelling, scrotal swelling, testicular pain and urgency.   Musculoskeletal:  Negative for arthralgias, back pain, joint swelling, myalgias and neck pain.   Skin:  Negative for color change and rash.   Allergic/Immunologic: Negative for environmental allergies and food allergies.   Neurological:  Negative for dizziness, seizures, syncope, weakness, light-headedness, numbness and headaches.   Hematological:  Negative for adenopathy. Does not bruise/bleed easily.   Psychiatric/Behavioral:  Negative for agitation, confusion, decreased concentration, hallucinations, self-injury, sleep disturbance and suicidal ideas. The patient is not nervous/anxious.      Objective:     Vital Signs (Most Recent):  Temp: 98.4 °F (36.9 °C) (03/08/25 1200)  Pulse: (!) 59 (03/08/25 1200)  Resp: 18 (03/08/25 0725)  BP: (!) 80/0 (03/08/25 1200)  SpO2: (!) 94 % (03/08/25 1200) Vital Signs (24h Range):  Temp:  [97.4 °F (36.3 °C)-98.4 °F (36.9 °C)] 98.4 °F (36.9 °C)  Pulse:  [59-70] 59  Resp:  [18-19] 18  SpO2:  [93 %-98 %] 94 %  BP: (70-84)/(0) 80/0     Weight: 104.6 kg (230 lb 9.6 oz)  Body mass index is 30.42 kg/m².    Estimated Creatinine Clearance: 39.3 mL/min (A) (based on SCr of 2.6 mg/dL (H)).     Physical Exam  Vitals and nursing note reviewed.   Constitutional:       Appearance: He is well-developed.   HENT:       Head: Normocephalic and atraumatic.     Eyes:      General: No scleral icterus.        Right eye: No discharge.         Left eye: No discharge.      Conjunctiva/sclera: Conjunctivae normal.   Pulmonary:      Effort: Pulmonary effort is normal.   Abdominal:       Musculoskeletal:         General: Normal range of motion.   Skin:     General: Skin is warm and dry.   Neurological:      Mental Status: He is alert and oriented to person, place, and time.   Psychiatric:         Behavior: Behavior normal.         Thought Content: Thought content normal.         Judgment: Judgment normal.          Significant Labs: CBC:   Recent Labs   Lab 03/07/25 0459 03/08/25 0521   WBC 3.83* 4.20   HGB 13.9* 13.5*   HCT 44.0 43.3    187     CMP:   Recent Labs   Lab 03/07/25 0459 03/08/25 0521    134*   K 3.6 3.9   CL 99 100   CO2 27 24   GLU 84 103   BUN 30* 29*   CREATININE 2.7* 2.6*   CALCIUM 9.5 9.6   PROT 8.0  --    ALBUMIN 3.6  --    BILITOT 0.6  --    ALKPHOS 97  --    *  --    *  --    ANIONGAP 10 10     Microbiology Results (last 7 days)       Procedure Component Value Units Date/Time    Culture, Anaerobe [[id number]] Collected: 03/07/25 1549    Order Status: Completed Specimen: Wound from Abdomen Updated: 03/08/25 1421     Anaerobic Culture Culture in progress    Narrative:      DLES    Aerobic culture [[id number]] Collected: 03/07/25 1549    Order Status: Completed Specimen: Wound from Abdomen Updated: 03/08/25 0718     Aerobic Bacterial Culture No growth    Narrative:      DLES    Gram stain [[id number]] Collected: 03/07/25 1549    Order Status: Completed Specimen: Wound from Abdomen Updated: 03/07/25 1843     Gram Stain Result Rare WBC's      No organisms seen    Narrative:      DLES    AFB Culture & Smear [[id number]] Collected: 03/07/25 1549    Order Status: Sent Specimen: Wound from Abdomen Updated: 03/07/25 1558    Fungus culture [[id number]] Collected: 03/07/25 1549    Order Status: Sent  Specimen: Wound from Abdomen Updated: 03/07/25 0193            Significant Imaging: I have reviewed all pertinent imaging results/findings within the past 24 hours.

## 2025-03-10 ENCOUNTER — PATIENT MESSAGE (OUTPATIENT)
Dept: TRANSPLANT | Facility: CLINIC | Age: 60
End: 2025-03-10
Payer: MEDICARE

## 2025-03-10 ENCOUNTER — TELEPHONE (OUTPATIENT)
Dept: ADMINISTRATIVE | Facility: CLINIC | Age: 60
End: 2025-03-10
Payer: MEDICARE

## 2025-03-10 ENCOUNTER — ANTI-COAG VISIT (OUTPATIENT)
Dept: CARDIOLOGY | Facility: CLINIC | Age: 60
End: 2025-03-10
Payer: MEDICARE

## 2025-03-10 NOTE — HPI
55 YO Male w/ diagnosis of Amiodarone induced hyperthyroidism Type 2, HTN ,DCM,(EF 10%, grade III DD) s/p HM II with Outflow graft changed (03/9/18) as DT, BiV-Icd [employer] (2014) and obesity that was admitted to [facility] on 7/1/20 for ICD pocket sie infection.  During hospital course patient was found to have subclinical hypothyroidism pattern on labs. Pt was started on LT4 50mcg and consulted with endocrinology for management of hypothyroidism.     Pt following with Dr. Piedra form endocrinology who recommended on 6/22/20 tapering off prednisone and repeat TFT at beginning of July, if no improvement of TFT plan was to start LT4 50mcg ruben    No

## 2025-03-10 NOTE — PROGRESS NOTES
Pt confirmed correct d/c dosing per calendar. INR scheduled for 3/11/25, pt verbalized understanding.

## 2025-03-10 NOTE — PROGRESS NOTES
"Phoned patient in response to reply of "2" to post-discharge texting tracker. Mr. Richards states that he was supposed to receive 2 medications when he was discharged from the hospital but he did not receive any. He states one was doxycycline and the other was a medication that would make him sick if he drank alcohol. Upon review, the doxycycline prescription was sent to our outpatient pharmacy, but records show it was transferred to his preferred [facility] on Saturday, 3/8 at 1128. Mr. Richards states [facility] does not have it. There was also no record of a second medication. Advised Mr. Richards that I would call him back after further review to determine what happened with both medications. Phoned [facility] who confirmed that they received the transfer and they would contact the patient once it is ready for pickup. Upon further review, the inpatient psychiatrist deferred treatment to Mr. Richards's outpatient psychiatrist to determine which MAT would be initiated for his etoh dependence. Phoned Mr. Richards to inform that [facility] did receive the [facility] and he will be contacted by [facility] once the prescription is ready for pickup. Also informed that he has to follow up with his outpatient psychiatrist to discuss further the MAT for etoh dependence. Mr. Richards verbalized understanding.    "

## 2025-03-10 NOTE — PROGRESS NOTES
Admitted 3/6-3/8. Hospital course: Addiction psych was consulted and abstinence counseling was provided discussed various options for medication/therapies to assist which will be discussed and initiated on an outpatient basis. Patient with the support of his wife are committed to plan to stop drinking and psych has reached out to pts outpatient psychiatrist whom he will follow up with. No s/s of withdrawal while inpatient. Resumed home xanax for as needed anxiety. Drive line culture was obtained, some drainage noted, ID recommended 30 days of doxycycline and he has follow up with them in clinic this month scheduled. He will follow up with AC clinic and HF clinic. LFTs were noted to be elevated on admission and U/S abdomen showing hepatic steatosis. Recommended continued abstinence from alcohol, weight loss and exercise. CTH obtain as pt has a orbital contusion from falling while intoxicated earlier in the week, negative for acute intracranial abnormality. Knee XR obtained as his knee was swollen after falling, negative for fracture however noted suprapatellar bursal effusion consistent with exam, swelling improving, able to bear weight and worked with PT while inpatient.

## 2025-03-11 ENCOUNTER — ANTI-COAG VISIT (OUTPATIENT)
Dept: CARDIOLOGY | Facility: CLINIC | Age: 60
End: 2025-03-11
Payer: MEDICARE

## 2025-03-11 ENCOUNTER — PATIENT MESSAGE (OUTPATIENT)
Dept: PSYCHIATRY | Facility: CLINIC | Age: 60
End: 2025-03-11
Payer: MEDICARE

## 2025-03-11 ENCOUNTER — LAB VISIT (OUTPATIENT)
Dept: LAB | Facility: HOSPITAL | Age: 60
End: 2025-03-11
Attending: INTERNAL MEDICINE
Payer: MEDICARE

## 2025-03-11 ENCOUNTER — PATIENT MESSAGE (OUTPATIENT)
Dept: TRANSPLANT | Facility: CLINIC | Age: 60
End: 2025-03-11
Payer: MEDICARE

## 2025-03-11 ENCOUNTER — PATIENT OUTREACH (OUTPATIENT)
Dept: ADMINISTRATIVE | Facility: CLINIC | Age: 60
End: 2025-03-11
Payer: MEDICARE

## 2025-03-11 DIAGNOSIS — Z95.811 HEART REPLACED BY HEART ASSIST DEVICE: ICD-10-CM

## 2025-03-11 LAB
BACTERIA SPEC ANAEROBE CULT: NORMAL
INR PPP: 1.9 (ref 0.8–1.2)
PROTHROMBIN TIME: 20.4 SEC (ref 9–12.5)

## 2025-03-11 PROCEDURE — 36415 COLL VENOUS BLD VENIPUNCTURE: CPT | Performed by: INTERNAL MEDICINE

## 2025-03-11 PROCEDURE — 85610 PROTHROMBIN TIME: CPT | Performed by: INTERNAL MEDICINE

## 2025-03-11 PROCEDURE — 93793 ANTICOAG MGMT PT WARFARIN: CPT | Mod: S$GLB,,,

## 2025-03-11 NOTE — PROGRESS NOTES
2nd attempt made to reach patient for TCC call. Left voicemail please call [phone] and leave first name, last name and  for Maikel. I will return your call.

## 2025-03-12 NOTE — PROGRESS NOTES
[facility]    2025 9:28 AM    Assessment/Plan:    Patient presents today with subtherapeutic  INR.    Assessment of patient findings and chart review: INR just slightly below goal; wants to quit drinking alcohol so may need dose increase in the long term    Recommendation for patient's warfarin regimen: Continue current maintenance dose    Recommend repeat INR Friday  _________________________________________________________________    Tim Richards (59 y.o.) is followed by the [facility].    Anticoagulation Summary  As of 3/11/2025      INR goal:  2.0-3.0   TTR:  70.1% (5.5 y)   INR used for dosin.9 (3/11/2025)   Warfarin maintenance plan:  5 mg (5 mg x 1) every day   Weekly warfarin total:  35 mg   Plan last modified:  Pearl Mendez, PharmD (10/29/2024)   Next INR check:  3/14/2025   Target end date:  --    Indications    LVAD (left ventricular assist device) present (Resolved) [Z95.811]  Anticoagulation monitoring  INR range 2-3 (Resolved) [Z79.01]                 Anticoagulation Episode Summary       INR check location:  Clinic Lab    Preferred lab:  --    Send INR reminders to:  [facility] COUMADIN LVAD    Comments:  LVAD/ [facility]/Lab [place] Results:[phone] Acct# [id number] Phone: [phone]/ Bridge:NO(h/o bleeds) see 3/12 encounter for meter process          Anticoagulation Care Providers       Provider Role Specialty Phone number    Antonio Hadley Jr., MD [facility] [phone]

## 2025-03-12 NOTE — PROGRESS NOTES
3rd Attempt made to reach patient for TCC call. Left voicemail please call [phone] leave first name, last name, and  for Maikel.  I will return your call.

## 2025-03-12 NOTE — PROGRESS NOTES
Patient reports correct Warfarin dose, cranberry juice 3/8/25, no other changes reported.        no stemi

## 2025-03-13 ENCOUNTER — RESULTS FOLLOW-UP (OUTPATIENT)
Dept: TRANSPLANT | Facility: CLINIC | Age: 60
End: 2025-03-13

## 2025-03-13 LAB — FUNGUS SPEC CULT: NORMAL

## 2025-03-14 ENCOUNTER — ANTI-COAG VISIT (OUTPATIENT)
Dept: CARDIOLOGY | Facility: CLINIC | Age: 60
End: 2025-03-14
Payer: MEDICARE

## 2025-03-14 ENCOUNTER — LAB VISIT (OUTPATIENT)
Dept: LAB | Facility: HOSPITAL | Age: 60
End: 2025-03-14
Attending: INTERNAL MEDICINE
Payer: MEDICARE

## 2025-03-14 DIAGNOSIS — Z95.811 HEART REPLACED BY HEART ASSIST DEVICE: ICD-10-CM

## 2025-03-14 LAB
INR PPP: 2.1 (ref 0.8–1.2)
PROTHROMBIN TIME: 22.1 SEC (ref 9–12.5)

## 2025-03-14 PROCEDURE — 85610 PROTHROMBIN TIME: CPT | Performed by: INTERNAL MEDICINE

## 2025-03-14 PROCEDURE — 36415 COLL VENOUS BLD VENIPUNCTURE: CPT | Performed by: INTERNAL MEDICINE

## 2025-03-14 NOTE — PROGRESS NOTES
[facility]    2025 4:11 PM    Assessment/Plan:    Patient presents today with therapeutic INR.    Assessment of patient findings and chart review: no significant findings     Recommendation for patient's warfarin regimen: Continue current maintenance dose    Recommend repeat INR Tuesday  _________________________________________________________________    Tim Richards (59 y.o.) is followed by the [facility].    Anticoagulation Summary  As of 3/14/2025      INR goal:  2.0-3.0   TTR:  70.0% (5.5 y)   INR used for dosin.1 (3/14/2025)   Warfarin maintenance plan:  5 mg (5 mg x 1) every day   Weekly warfarin total:  35 mg   Plan last modified:  Pearl Mendez, PharmD (10/29/2024)   Next INR check:  3/18/2025   Target end date:  --    Indications    LVAD (left ventricular assist device) present (Resolved) [Z95.811]  Anticoagulation monitoring  INR range 2-3 (Resolved) [Z79.01]                 Anticoagulation Episode Summary       INR check location:  Clinic Lab    Preferred lab:  --    Send INR reminders to:  [facility] COUMADIN LVAD    Comments:  LVAD/ [facility] Lab/Lab [place] Results:[phone] Acct# [id number] Phone: [phone]/ Bridge:NO(h/o bleeds) see 3/12 encounter for meter process          Anticoagulation Care Providers       Provider Role Specialty Phone number    Antonio Hadley Jr., MD [facility] [phone]

## 2025-03-18 ENCOUNTER — ANTI-COAG VISIT (OUTPATIENT)
Dept: CARDIOLOGY | Facility: CLINIC | Age: 60
End: 2025-03-18
Payer: MEDICARE

## 2025-03-18 ENCOUNTER — LAB VISIT (OUTPATIENT)
Dept: LAB | Facility: HOSPITAL | Age: 60
End: 2025-03-18
Attending: INTERNAL MEDICINE
Payer: MEDICARE

## 2025-03-18 DIAGNOSIS — Z95.811 HEART REPLACED BY HEART ASSIST DEVICE: ICD-10-CM

## 2025-03-18 LAB
INR PPP: 4.3 (ref 0.8–1.2)
PROTHROMBIN TIME: 43.9 SEC (ref 9–12.5)

## 2025-03-18 PROCEDURE — 85610 PROTHROMBIN TIME: CPT | Performed by: INTERNAL MEDICINE

## 2025-03-18 PROCEDURE — 36415 COLL VENOUS BLD VENIPUNCTURE: CPT | Performed by: INTERNAL MEDICINE

## 2025-03-18 PROCEDURE — 93793 ANTICOAG MGMT PT WARFARIN: CPT | Mod: S$GLB,,,

## 2025-03-18 RX ORDER — MEXILETINE HYDROCHLORIDE 250 MG/1
250 CAPSULE ORAL EVERY 8 HOURS
Qty: 90 CAPSULE | Refills: 11 | Status: SHIPPED | OUTPATIENT
Start: 2025-03-18 | End: 2025-03-19 | Stop reason: SDUPTHER

## 2025-03-18 NOTE — PROGRESS NOTES
Pt stated he thought he missed 3/16/25 Warfarin dose so he took Warfarin 7.5mg 3/17/2025 on his own, took correct dose all other days. No other changes reported.

## 2025-03-18 NOTE — PROGRESS NOTES
[facility]    2025 3:44 PM    Assessment/Plan:    Patient presents today with supratherapeutic INR.    Assessment of patient findings and chart review: Reports taking extra dose yesterday because thought he missed  dose     Recommendation for patient's warfarin regimen: Hold dose today    Recommend repeat INR Thursday  _________________________________________________________________    Tim Richards (59 y.o.) is followed by the [facility].    Anticoagulation Summary  As of 3/18/2025      INR goal:  2.0-3.0   TTR:  70.2% (5.3 y)   INR used for dosin.3 (3/18/2025)   Warfarin maintenance plan:  5 mg (5 mg x 1) every day   Weekly warfarin total:  35 mg   Plan last modified:  Pearl Mendez, PharmD (10/29/2024)   Next INR check:  3/20/2025   Target end date:  --    Indications    LVAD (left ventricular assist device) present (Resolved) [Z95.811]  Anticoagulation monitoring  INR range 2-3 (Resolved) [Z79.01]                 Anticoagulation Episode Summary       INR check location:  Clinic Lab    Preferred lab:  --    Send INR reminders to:  [facility]    Comments:  LVAD/ MH - [facility] - [place], [place] Lab/Lab [place] Results:[phone] Acct# [id number] Phone: [phone]/ Bridge:NO(h/o bleeds) see 3/12 encounter for meter process          Anticoagulation Care Providers       Provider Role Specialty Phone number    Antonio Hadley Jr., MD [facility] [phone]

## 2025-03-19 ENCOUNTER — PATIENT MESSAGE (OUTPATIENT)
Dept: TRANSPLANT | Facility: CLINIC | Age: 60
End: 2025-03-19
Payer: MEDICARE

## 2025-03-19 RX ORDER — MEXILETINE HYDROCHLORIDE 250 MG/1
250 CAPSULE ORAL EVERY 8 HOURS
Qty: 90 CAPSULE | Refills: 11 | OUTPATIENT
Start: 2025-03-19

## 2025-03-20 ENCOUNTER — LAB VISIT (OUTPATIENT)
Dept: LAB | Facility: HOSPITAL | Age: 60
End: 2025-03-20
Attending: INTERNAL MEDICINE
Payer: MEDICARE

## 2025-03-20 ENCOUNTER — ANTI-COAG VISIT (OUTPATIENT)
Dept: CARDIOLOGY | Facility: CLINIC | Age: 60
End: 2025-03-20
Payer: MEDICARE

## 2025-03-20 DIAGNOSIS — Z95.811 HEART REPLACED BY HEART ASSIST DEVICE: ICD-10-CM

## 2025-03-20 LAB
INR PPP: 2.6 (ref 0.8–1.2)
PROTHROMBIN TIME: 27 SEC (ref 9–12.5)

## 2025-03-20 PROCEDURE — 36415 COLL VENOUS BLD VENIPUNCTURE: CPT | Performed by: INTERNAL MEDICINE

## 2025-03-20 PROCEDURE — 85610 PROTHROMBIN TIME: CPT | Performed by: INTERNAL MEDICINE

## 2025-03-20 RX ORDER — MEXILETINE HYDROCHLORIDE 250 MG/1
250 CAPSULE ORAL EVERY 8 HOURS
Qty: 270 CAPSULE | Refills: 6 | Status: SHIPPED | OUTPATIENT
Start: 2025-03-20

## 2025-03-20 NOTE — PROGRESS NOTES
[facility]    2025 1:21 PM    Assessment/Plan:    Patient presents today with therapeutic INR.    Assessment of patient findings and chart review: no significant findings     Recommendation for patient's warfarin regimen: Continue current maintenance dose    Recommend repeat INR Monday  _________________________________________________________________    Tim Richards (59 y.o.) is followed by the [facility].    Anticoagulation Summary  As of 3/20/2025      INR goal:  2.0-3.0   TTR:  70.2% (5.3 y)   INR used for dosin.6 (3/20/2025)   Warfarin maintenance plan:  5 mg (5 mg x 1) every day   Weekly warfarin total:  35 mg   Plan last modified:  Pearl Mendez, PharmD (10/29/2024)   Next INR check:  3/24/2025   Target end date:  --    Indications    LVAD (left ventricular assist device) present (Resolved) [Z95.811]  Anticoagulation monitoring  INR range 2-3 (Resolved) [Z79.01]                 Anticoagulation Episode Summary       INR check location:  Clinic Lab    Preferred lab:  --    Send INR reminders to:  [facility] LV[facility]    Comments:  LVAD/ [facility] Lab/Lab [place] Results:[phone] Acct# [id number] Phone: [phone]/ Bridge:NO(h/o bleeds) see 3/12 encounter for meter process          Anticoagulation Care Providers       Provider Role Specialty Phone number    Antonio Hadley Jr., MD [facility] [phone]

## 2025-03-25 ENCOUNTER — TELEPHONE (OUTPATIENT)
Dept: TRANSPLANT | Facility: CLINIC | Age: 60
End: 2025-03-25
Payer: MEDICARE

## 2025-03-25 NOTE — TELEPHONE ENCOUNTER
VAD Contact: Called Patient regarding equipment maintenance due at upcoming clinic appointment on 3/26.  Requested Patient to bring Power Module and Emergency Bag (2 batteries, 2 clips, 1 backup controller) with them to next appointment for maintenance.  Patient stated it did not need maintenance as it was completed in 12/2024. I have no record of that. Let patient know.  It is medically necessary to ensure patient has properly functioning equipment and wearables to prevent infection, injury or death to patient.

## 2025-03-26 ENCOUNTER — OFFICE VISIT (OUTPATIENT)
Dept: TRANSPLANT | Facility: CLINIC | Age: 60
End: 2025-03-26
Payer: MEDICARE

## 2025-03-26 ENCOUNTER — ANTI-COAG VISIT (OUTPATIENT)
Dept: CARDIOLOGY | Facility: CLINIC | Age: 60
End: 2025-03-26
Payer: MEDICARE

## 2025-03-26 ENCOUNTER — CLINICAL SUPPORT (OUTPATIENT)
Dept: TRANSPLANT | Facility: CLINIC | Age: 60
End: 2025-03-26
Payer: MEDICARE

## 2025-03-26 ENCOUNTER — OFFICE VISIT (OUTPATIENT)
Dept: INFECTIOUS DISEASES | Facility: CLINIC | Age: 60
End: 2025-03-26
Payer: MEDICARE

## 2025-03-26 ENCOUNTER — LAB VISIT (OUTPATIENT)
Dept: LAB | Facility: HOSPITAL | Age: 60
End: 2025-03-26
Attending: INTERNAL MEDICINE
Payer: MEDICARE

## 2025-03-26 ENCOUNTER — TELEPHONE (OUTPATIENT)
Dept: ELECTROPHYSIOLOGY | Facility: CLINIC | Age: 60
End: 2025-03-26
Payer: MEDICARE

## 2025-03-26 VITALS — WEIGHT: 225 LBS | SYSTOLIC BLOOD PRESSURE: 68 MMHG | BODY MASS INDEX: 29.82 KG/M2 | TEMPERATURE: 98 F | HEIGHT: 73 IN

## 2025-03-26 DIAGNOSIS — I72.9 MYCOTIC ANEURYSM: ICD-10-CM

## 2025-03-26 DIAGNOSIS — Z86.79 HISTORY OF VENTRICULAR TACHYCARDIA: ICD-10-CM

## 2025-03-26 DIAGNOSIS — I42.0 DILATED CARDIOMYOPATHY: ICD-10-CM

## 2025-03-26 DIAGNOSIS — I50.84 END STAGE HEART FAILURE: ICD-10-CM

## 2025-03-26 DIAGNOSIS — J84.10 LUNG FIBROSIS: Primary | ICD-10-CM

## 2025-03-26 DIAGNOSIS — Z79.01 ANTICOAGULATION MONITORING, INR RANGE 2-3: ICD-10-CM

## 2025-03-26 DIAGNOSIS — F10.90 ALCOHOL USE DISORDER: Primary | Chronic | ICD-10-CM

## 2025-03-26 DIAGNOSIS — N18.32 STAGE 3B CHRONIC KIDNEY DISEASE: ICD-10-CM

## 2025-03-26 DIAGNOSIS — T82.9XXD LEFT VENTRICULAR ASSIST DEVICE (LVAD) COMPLICATION, SUBSEQUENT ENCOUNTER: ICD-10-CM

## 2025-03-26 DIAGNOSIS — T82.7XXA INFECTION ASSOCIATED WITH DRIVELINE OF VENTRICULAR ASSIST DEVICE: Primary | ICD-10-CM

## 2025-03-26 DIAGNOSIS — F43.10 POST TRAUMATIC STRESS DISORDER (PTSD): ICD-10-CM

## 2025-03-26 DIAGNOSIS — I51.89 COMBINED SYSTOLIC AND DIASTOLIC CARDIAC DYSFUNCTION: ICD-10-CM

## 2025-03-26 DIAGNOSIS — I72.4 PSEUDOANEURYSM OF RIGHT FEMORAL ARTERY: ICD-10-CM

## 2025-03-26 DIAGNOSIS — I47.20 VT (VENTRICULAR TACHYCARDIA): ICD-10-CM

## 2025-03-26 DIAGNOSIS — Z95.811 LVAD (LEFT VENTRICULAR ASSIST DEVICE) PRESENT: Chronic | ICD-10-CM

## 2025-03-26 DIAGNOSIS — Z95.811 LVAD (LEFT VENTRICULAR ASSIST DEVICE) PRESENT: ICD-10-CM

## 2025-03-26 DIAGNOSIS — T82.7XXA INFECTION ASSOCIATED WITH DRIVELINE OF VENTRICULAR ASSIST DEVICE: ICD-10-CM

## 2025-03-26 DIAGNOSIS — Z95.811 HEART REPLACED BY HEART ASSIST DEVICE: ICD-10-CM

## 2025-03-26 LAB
ABSOLUTE EOSINOPHIL (OHS): 0.12 K/UL
ABSOLUTE MONOCYTE (OHS): 0.47 K/UL (ref 0.3–1)
ABSOLUTE NEUTROPHIL COUNT (OHS): 1.72 K/UL (ref 1.8–7.7)
ALBUMIN SERPL BCP-MCNC: 3.8 G/DL (ref 3.5–5.2)
ALP SERPL-CCNC: 95 UNIT/L (ref 40–150)
ALT SERPL W/O P-5'-P-CCNC: 88 UNIT/L (ref 10–44)
ANION GAP (OHS): 14 MMOL/L (ref 8–16)
AST SERPL-CCNC: 82 UNIT/L (ref 11–45)
BASOPHILS # BLD AUTO: 0.02 K/UL
BASOPHILS NFR BLD AUTO: 0.6 %
BILIRUB DIRECT SERPL-MCNC: 0.2 MG/DL (ref 0.1–0.3)
BILIRUB DIRECT SERPL-MCNC: 0.2 MG/DL (ref 0.1–0.3)
BILIRUB SERPL-MCNC: 0.4 MG/DL (ref 0.1–1)
BNP SERPL-MCNC: 34 PG/ML (ref 0–99)
BNP SERPL-MCNC: 37 PG/ML (ref 0–99)
BUN SERPL-MCNC: 19 MG/DL (ref 6–20)
CALCIUM SERPL-MCNC: 9.7 MG/DL (ref 8.7–10.5)
CHLORIDE SERPL-SCNC: 102 MMOL/L (ref 95–110)
CO2 SERPL-SCNC: 22 MMOL/L (ref 23–29)
CREAT SERPL-MCNC: 2.2 MG/DL (ref 0.5–1.4)
CRP SERPL-MCNC: 32.6 MG/L
CRP SERPL-MCNC: 33.6 MG/L
ERYTHROCYTE [DISTWIDTH] IN BLOOD BY AUTOMATED COUNT: 13.6 % (ref 11.5–14.5)
GFR SERPLBLD CREATININE-BSD FMLA CKD-EPI: 34 ML/MIN/1.73/M2
GLUCOSE SERPL-MCNC: 79 MG/DL (ref 70–110)
HCT VFR BLD AUTO: 44.3 % (ref 40–54)
HGB BLD-MCNC: 14 GM/DL (ref 14–18)
IMM GRANULOCYTES # BLD AUTO: 0.02 K/UL (ref 0–0.04)
IMM GRANULOCYTES NFR BLD AUTO: 0.6 % (ref 0–0.5)
INR PPP: 1.6 (ref 0.8–1.2)
INR PPP: 1.7 (ref 0.8–1.2)
LDH SERPL-CCNC: 197 U/L (ref 110–260)
LYMPHOCYTES # BLD AUTO: 0.97 K/UL (ref 1–4.8)
MAGNESIUM SERPL-MCNC: 1.9 MG/DL (ref 1.6–2.6)
MCH RBC QN AUTO: 28.9 PG (ref 27–50)
MCHC RBC AUTO-ENTMCNC: 31.6 G/DL (ref 32–36)
MCV RBC AUTO: 92 FL (ref 82–98)
NUCLEATED RBC (/100WBC) (OHS): 0 /100 WBC
PHOSPHATE SERPL-MCNC: 2.5 MG/DL (ref 2.7–4.5)
PLATELET # BLD AUTO: 237 K/UL (ref 150–450)
PMV BLD AUTO: 10.4 FL (ref 9.2–12.9)
POTASSIUM SERPL-SCNC: 4.1 MMOL/L (ref 3.5–5.1)
PREALB SERPL-MCNC: 20 MG/DL (ref 20–43)
PROT SERPL-MCNC: 7.9 GM/DL (ref 6–8.4)
PROTHROMBIN TIME: 17.3 SECONDS (ref 9–12.5)
PROTHROMBIN TIME: 18.2 SECONDS (ref 9–12.5)
RBC # BLD AUTO: 4.84 M/UL (ref 4.6–6.2)
RELATIVE EOSINOPHIL (OHS): 3.6 %
RELATIVE LYMPHOCYTE (OHS): 29.2 % (ref 18–48)
RELATIVE MONOCYTE (OHS): 14.2 % (ref 4–15)
RELATIVE NEUTROPHIL (OHS): 51.8 % (ref 38–73)
SODIUM SERPL-SCNC: 138 MMOL/L (ref 136–145)
WBC # BLD AUTO: 3.32 K/UL (ref 3.9–12.7)

## 2025-03-26 PROCEDURE — 86140 C-REACTIVE PROTEIN: CPT

## 2025-03-26 PROCEDURE — 84100 ASSAY OF PHOSPHORUS: CPT

## 2025-03-26 PROCEDURE — 85025 COMPLETE CBC W/AUTO DIFF WBC: CPT

## 2025-03-26 PROCEDURE — 85610 PROTHROMBIN TIME: CPT

## 2025-03-26 PROCEDURE — 99214 OFFICE O/P EST MOD 30 MIN: CPT | Mod: S$GLB,,, | Performed by: INTERNAL MEDICINE

## 2025-03-26 PROCEDURE — 1111F DSCHRG MED/CURRENT MED MERGE: CPT | Mod: CPTII,S$GLB,, | Performed by: INTERNAL MEDICINE

## 2025-03-26 PROCEDURE — 83735 ASSAY OF MAGNESIUM: CPT

## 2025-03-26 PROCEDURE — 80053 COMPREHEN METABOLIC PANEL: CPT

## 2025-03-26 PROCEDURE — 36415 COLL VENOUS BLD VENIPUNCTURE: CPT | Mod: 59

## 2025-03-26 PROCEDURE — 83880 ASSAY OF NATRIURETIC PEPTIDE: CPT

## 2025-03-26 PROCEDURE — 99999 PR PBB SHADOW E&M-EST. PATIENT-LVL III: CPT | Mod: PBBFAC,,, | Performed by: INTERNAL MEDICINE

## 2025-03-26 PROCEDURE — 83615 LACTATE (LD) (LDH) ENZYME: CPT

## 2025-03-26 PROCEDURE — 82248 BILIRUBIN DIRECT: CPT

## 2025-03-26 PROCEDURE — 99999 PR PBB SHADOW E&M-EST. PATIENT-LVL II: CPT | Mod: PBBFAC,,, | Performed by: INTERNAL MEDICINE

## 2025-03-26 PROCEDURE — 84134 ASSAY OF PREALBUMIN: CPT

## 2025-03-26 RX ORDER — CEFADROXIL 500 MG/1
500 CAPSULE ORAL EVERY 12 HOURS
Qty: 60 CAPSULE | Refills: 4 | Status: SHIPPED | OUTPATIENT
Start: 2025-03-26 | End: 2025-08-23

## 2025-03-26 NOTE — LETTER
March 26, 2025        Nader Chiu  [address] [place], [place] [address]  [address]  [place] [address]  Phone: [phone]  Fax: [phone]     Nader Chiu  [address] [place] [address]  Phone: [phone]  Fax: [phone]             [facility]  [address]  [place] [address]  Phone: [phone]   Patient: Tim Richards   MR Number: [id number]   YOB: 1965   Date of Visit: 3/26/2025       Dear Dr. Nader Chiu, Nader Chiu    Thank you for referring Tim Richards to me for evaluation. Attached you will find relevant portions of my assessment and plan of care.    If you have questions, please do not hesitate to call me. I look forward to following Tim Richards along with you.    Sincerely,    Gaurav Rodriguez MD    Enclosure    If you would like to receive this communication electronically, please contact [email or web address] or [phone] to request [employer] access.    [employer] is a tool which provides read-only access to select patient information with whom you have a relationship. Its easy to use and provides real time access to review your patients record including encounter summaries, notes, results, and demographic information.    If you feel you have received this communication in error or would no longer like to receive these types of communications, please e-mail [email or web address]

## 2025-03-26 NOTE — PROGRESS NOTES
[facility]    2025 12:13 PM    Assessment/Plan:    Patient presents today with subtherapeutic  INR.    Assessment of patient findings and chart review: reports green tea intake     Recommendation for patient's warfarin regimen: Boost dose today to 7.5mg then resume current maintenance dose    Recommend repeat INR Monday  _________________________________________________________________    Tim Richards (59 y.o.) is followed by the [facility].    Anticoagulation Summary  As of 3/26/2025      INR goal:  2.0-3.0   TTR:  70.1% (5.3 y)   INR used for dosin.7 (3/26/2025)   Warfarin maintenance plan:  5 mg (5 mg x 1) every day   Weekly warfarin total:  35 mg   Plan last modified:  Pearl Mendez, PharmD (10/29/2024)   Next INR check:  3/31/2025   Target end date:  --    Indications    LVAD (left ventricular assist device) present (Resolved) [Z95.811]  Anticoagulation monitoring  INR range 2-3 (Resolved) [Z79.01]                 Anticoagulation Episode Summary       INR check location:  Clinic Lab    Preferred lab:  --    Send INR reminders to:  [facility]    Comments:  LVAD/ [facility] [place] Lab/Lab [place] Results:[phone] Acct# [id number] Phone: [phone]/ Bridge:NO(h/o bleeds) see 3/12 encounter for meter process          Anticoagulation Care Providers       Provider Role Specialty Phone number    Antonio Hadley Jr., MD [facility] [phone]

## 2025-03-26 NOTE — PROGRESS NOTES
Infectious Disease Clinic  [facility]  Subjective:      Patient ID:Orestes Richards is a 59 y.o. male       Chief Complaint:   Chief Complaint   Patient presents with    Follow-up       History of Present Illness    A 59 y.o. 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 While admitted to the hospital he was noted to have increased exit site drainage. Cultures were positive for Corynebacterium striatum. He was discharged on a 30 day course of doxycycline. Today, he continues to have drainage. He feels the drainage was less with a silver kit dressing.       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 Doxycycline x 30 days.    Review of Systems   Constitutional: Negative for chills, decreased appetite, fever, malaise/fatigue, night sweats, weight gain and weight loss.   HENT:  Negative for congestion, ear pain, hearing loss, hoarse voice, sore throat and tinnitus.    Eyes:  Negative for blurred vision, redness and visual disturbance.   Cardiovascular:  Negative for chest pain, leg swelling and palpitations.   Respiratory:  Negative for cough, hemoptysis, shortness of breath, sputum production and wheezing.    Hematologic/Lymphatic: Negative for adenopathy. Does not bruise/bleed easily.   Skin:  Negative for dry skin, itching, rash and suspicious lesions.   Musculoskeletal:  Negative for back pain, joint pain, myalgias and neck pain.   Gastrointestinal:  Negative for abdominal pain, constipation, diarrhea, heartburn, nausea and vomiting.   Genitourinary:  Negative for dysuria, flank pain, frequency, hematuria, hesitancy and urgency.   Neurological:  Negative for dizziness, headaches,  numbness, paresthesias and weakness.   Psychiatric/Behavioral:  Negative for depression and memory loss. The patient does not have insomnia and is not nervous/anxious.        Objective:     Physical Exam  Vitals and nursing note reviewed. Exam conducted with a chaperone present.   Constitutional:       Appearance: Normal appearance.   HENT:      Head: Normocephalic and atraumatic.   Eyes:      General: No scleral icterus.        Right eye: No discharge.         Left eye: No discharge.      Conjunctiva/sclera: Conjunctivae normal.   Cardiovascular:      Heart sounds: No murmur heard.     Comments: VAD Humming sounds  Abdominal:      Comments: DLES clear yellow drainage. Hypergranulation tissue noted at the exit site. Bloody yellow secretions on the driveline dressing.    Musculoskeletal:         General: Normal range of motion.   Skin:     General: Skin is warm and dry.   Neurological:      General: No focal deficit present.      Mental Status: He is alert and oriented to person, place, and time.           Assessment:       ICD-10-CM ICD-9-CM   1. 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 Management as orders below.   Complete doxycycline then start antibiotic below:  Orders Placed This Encounter    cefadroxil (DURICEF) 500 MG Cap    MYC E-Visit   Discussed management plan with VAD physician and coordinator.   RTC in 1 month. Patient requested his visit as E-visit.       The total time for evaluation and management services performed on 3/26/25 was 30 minutes

## 2025-03-26 NOTE — PROCEDURES
3/26/2025    10:22 AM 3/8/2025     5:09 AM 3/7/2025     8:33 PM 3/7/2025     4:32 PM 3/7/2025    11:40 AM 3/7/2025     7:20 AM 3/7/2025     3:44 AM   TXP SACHI INTERROGATIONS   Type HeartMate3         Flow 4.1         Speed 6300         PI 3.7         Power (Jackson) 5.2         LSL 5900         Pulsatility No Pulse No Pulse No Pulse Intermittent pulse Intermittent pulse Intermittent pulse No Pulse   }

## 2025-03-26 NOTE — PROGRESS NOTES
Patient reports correct Warfarin dose, dehydrated, lightheadedness(stated LVAD Team aware symptoms), green tea 3/24/25, no other changes reported.

## 2025-03-26 NOTE — PROGRESS NOTES
Subjective:   Patient ID:  Tim Richards is a 59 y.o. male who presents for LVAD followup visit.    Implant date: Heartmate II on 3/8/2018 (outflow graft changed 3/9/2018), exchanged to Heartmate 3 on 7/13/2022         3/26/2025    10:22 AM 3/8/2025     5:09 AM 3/7/2025     8:33 PM 3/7/2025     4:32 PM 3/7/2025    11:40 AM 3/7/2025     7:20 AM 3/7/2025     3:44 AM   TXP SACHI INTERROGATIONS   Type HeartMate3         Flow 4.1         Speed 6300         PI 3.7         Power (Jackson) 5.2         LSL 5900         Pulsatility No Pulse No Pulse No Pulse Intermittent pulse Intermittent pulse Intermittent pulse No Pulse       60 YO M w/ stage D HFrEF who was previously supported with a HM2 (implanted 3/8/2018 as DT-VAD) but required pump exchange (HM3 pump exchange 7/13/2022) for thrombosis of pump post COVID-19 PNA and AHRF. His post-op course following pump exchange was prolonged and complicated by worsening cardiogenic shock/RV failure requiring VA-ECMO. He also had recurrent VT as well as atrial flutter with RVR and is on chronic amiodarone and mexiletine. In June/July 2022 he was tapered of steroids (on for amiodarone hyperthyroid) due to concern for avascular necrosis of hip. 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     He was seen in clinic last about 5 months prior. Earlier this month had an admission to our hospital for a fall. At that time admitted to having issues with his alcohol consumption. He was admitted and demonstrated no signs of withdrawal, discharged in a stable condition and advised to enroll in outpatient rehab program.    He comes in today for follow-up.  Says that overall he has been doing well.  Has not had a drink since his hospital admission.   "He says that he is doing this on his own and not currently enrolled in rehab.  He has been noticing that he feels a little dry, with some orthostatic lightheadedness and low blood pressures.  On only torsemide p.r.n. for diuretics and has not been taking it.    ROS    Objective:   Blood pressure (!) 68/0, temperature 97.9 °F (36.6 °C), temperature source Oral, height 6' 1" (1.854 m), weight 102.1 kg (225 lb).body mass index is 29.69 kg/m².    Doppler: 68    Physical Exam  Vitals reviewed.   Constitutional:       General: He is not in acute distress.  HENT:      Head: Atraumatic.   Eyes:      Extraocular Movements: Extraocular movements intact.   Cardiovascular:      Comments: LVAD hum present.  Pulmonary:      Breath sounds: Normal breath sounds.   Abdominal:      Palpations: Abdomen is soft.      Tenderness: There is no abdominal tenderness.   Musculoskeletal:         General: Normal range of motion.      Right lower leg: No edema.      Left lower leg: No edema.   Neurological:      General: No focal deficit present.      Mental Status: He is alert and oriented to person, place, and time.         Lab Results   Component Value Date    WBC 3.32 (L) 03/26/2025    HGB 14.0 03/26/2025    HCT 44.3 03/26/2025    MCV 92 03/26/2025     03/26/2025    CO2 24 03/08/2025    CREATININE 2.6 (H) 03/08/2025    CALCIUM 9.6 03/08/2025    ALBUMIN 3.6 03/07/2025     (H) 03/07/2025    BNP 66 03/07/2025     (H) 03/07/2025     03/08/2025       Lab Results   Component Value Date    INR 2.6 (H) 03/20/2025    INR 4.3 (H) 03/18/2025    INR 2.1 (H) 03/14/2025       BNP   Date Value Ref Range Status   03/07/2025 66 0 - 99 pg/mL Final     Comment:     Values of less than 100 pg/ml are consistent with non-CHF populations.   11/06/2024 326 (H) 0 - 99 pg/mL Final     Comment:     Values of less than 100 pg/ml are consistent with non-CHF populations.   07/25/2024 132 (H) 0 - 99 pg/mL Final     Comment:     Values of less " than 100 pg/ml are consistent with non-CHF populations.       LD   Date Value Ref Range Status   03/08/2025 199 110 - 260 U/L Final     Comment:     Results are increased in hemolyzed samples.   03/07/2025 199 110 - 260 U/L Final     Comment:     Results are increased in hemolyzed samples.   03/06/2025 262 (H) 110 - 260 U/L Final     Comment:     Results are increased in hemolyzed samples.       Labs were reviewed with the patient.    No results found for this or any previous visit.    No results found for this or any previous visit.      Assessment:      1. Alcohol use disorder    2. LVAD (left ventricular assist device) present    3. VT (ventricular tachycardia)    4. End stage heart failure    5. Post traumatic stress disorder (PTSD)    6. Combined systolic and diastolic cardiac dysfunction    7. History of ventricular tachycardia    8. Dilated cardiomyopathy    9. 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 Stage 3b chronic kidney disease    13. 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 Anticoagulation monitoring, INR range 2-3        Plan:     Comes in today for follow-up.  Doing well.  Has not had any alcohol since his hospital admission and endorses abstinence on his own.  Has been having some orthostatic lightheadedness and encouraged better p.o. hydration.  Reaching out to electrophysiology to see if we can get his mexiletine covered as he lost insurance coverage for this.  We will plan on getting a CT of his chest without contrast during his next clinic visit as his pulmonary function testing that was done last year showed some restriction.  Continue meds otherwise unchanged.    Additionally, the patient has a patient centered goal of being able to improve their quality of life    GDMT:  Blood pressure is too low to tolerate.    Patient is now NYHA II  Recommend 2 gram  sodium restriction and 1500cc fluid restriction.  Encourage physical activity with graded exercise program.  Requested patient to weigh themselves daily, and to notify us if their weight increases by more than 3 lbs in 1 day or 5 lbs in 1 week.     Patient advised that it is recommended that all patients, and their close contacts and household members receive Covid vaccination.    Listed for transplant: No    UNOS Patient Status  Functional Status: 90% - Able to carry on normal activity: minor symptoms of disease  Physical Capacity: No Limitations  Working for Income: Unknown    Advance Care Planning     Date: 03/26/2025    Power of   I initiated the process of voluntary advance care planning today and explained the importance of this process to the patient.  I introduced the concept of advance directives to the patient, as well. Then the patient received detailed information about the importance of designating a Health Care Power of  (HCPOA). He was also instructed to communicate with this person about their wishes for future healthcare, should he become sick and lose decision-making capacity. The patient has previously appointed a HCPOA.     Gaurav Rodriguez MD

## 2025-03-26 NOTE — PROGRESS NOTES
Equipment:  Any Equipment Needs: Routine Maintenance    Emergency Bag  Emergency Equipment With Patient: Yes   Condition of emergency bag: NO visible damage  Contents of emergency bag: Backup controller, 2 fully charged batteries, 2 battery clips, reference cards    Maintenance Tracking  Patient has monthly checklist today: No  Patient correctly utilizing monthly checklist: No  Educated patient on utilizing monthly checklist correctly and importance of this: Yes    Equipment Inspection  Inspected patient's equipment today: Yes  Equipment clean and free of debris, including locking mechanism: Yes  Cleaned equipment today and educated patient on how to do this: Yes  Manual and visual inspection of driveline: NO   Kinks, rescue tape, tears: No  Clamshell repair: No  Perc lead repair: No    Patient wearing waist strap, vest, diana vest, concealed carry shirt: Concealed carry shirt  Condition of this: NO visible damage        Backup Controller and Emergency Backup Battery  EBB due to be charged: 9/2025  EBB charged today and self test completed: Yes  Self test passed:  Yes  EBB expires and due to be changed: 9/2027  EBB changed today: Yes      Equipment Needs  Equipment issued to patient today in clinic: Yes   Discussed with MCS Coordinator and physician: Yes  Items Under Warranty No VAD Coordinator Notified Yes  Equipment loaned to patient today: No   Waveforms sent: No   It is medically necessary to ensure patient has properly functioning equipment and wearables to prevent infection, injury or death to patient.        Returned patients 2 batteries that he left at last discharge, Patients backup EBB exchnaged. SN: [id number]

## 2025-03-26 NOTE — TELEPHONE ENCOUNTER
Spoke to pt. Advised pt per Dr. Fay on 2/4/25, there is no alternative to Mexiletine and he's already on Amiodarone 400 mg daily. Pt stated Dr. Yun placed him on Mexiletine since he was still having palpitations on Amiodarone. Pt not currently experiencing symptoms only when on Amiodarone alone. Advised pt will discuss with Dr. Fay and follow up with him. Patient verbalized understanding and had no further questions.

## 2025-03-26 NOTE — TELEPHONE ENCOUNTER
----- Message from EMILY Sandoval sent at 3/26/2025 10:22 AM CDT -----  Regarding: FW: mexilitine  Please see below. Former Yun patient. Has appointment with Sumeet in June  ----- Message -----  From: Bridget Freeman RN  Sent: 3/26/2025  10:03 AM CDT  To: Paz Bai RN; Antonio Fay MD; Tonie  Subject: mexilitine                                       Good morning, Mr. Richards is in VAD Clinic this morning and informed us that his insurance is no longer covering the mexilitine- it is now $300 a month. He only has one more capsule left. Can someone advise him on what to do?Thank you,Bridget Freeman RN, VAD Coordinator

## 2025-03-27 RX ORDER — METOPROLOL SUCCINATE 25 MG/1
12.5 TABLET, EXTENDED RELEASE ORAL 2 TIMES DAILY
Qty: 90 TABLET | Refills: 3 | Status: SHIPPED | OUTPATIENT
Start: 2025-03-27 | End: 2026-03-27

## 2025-03-27 NOTE — PROGRESS NOTES
Date of Implant with Heartmate 3 LVAD: 7/13/22    PATIENT ARRIVED IN CLINIC:  Ambulatory   Accompanied by: self  Complaints/reason for visit today: routine    Vitals  Temperature, oral:   Temp Readings from Last 1 Encounters:   03/26/25 97.9 °F (36.6 °C) (Oral)     Blood Pressure:   BP Readings from Last 3 Encounters:   03/26/25 (!) 68/0   03/08/25 (!) 80/0   11/06/24 (!) 76/0        VAD Interrogation:      3/26/2025    10:22 AM 3/8/2025     5:09 AM 3/7/2025     8:33 PM   TXP SACHI INTERROGATIONS   Type HeartMate3     Flow 4.1     Speed 6300     PI 3.7     Power (Jackson) 5.2     LSL 5900     Pulsatility No Pulse No Pulse No Pulse     HCT:   Lab Results   Component Value Date    HCT 44.3 03/26/2025    HCT 43.3 03/08/2025    HCT 31 (L) 09/27/2022       VAD Assessment  Problems / Issues /Equipment Needs/ Alarms with VAD if any: None noted  VAD Sounds: HM3 Smooth  VAD Binder With Patient: No  Reviewed VAD Numbers In Binder: No  Emergency Equipment With Patient: Yes   Equipment Needs: No   It is medically necessary to ensure patient has properly functioning equipment and wearables to prevent infection, injury or death to patient.     DLES Assessment and Dressing Care:  Appearance of DLES: 2-3 with bloody/purulent drainage. Another silver nitrate applied to hypergranulation today.     Antibiotics: YES finish doxy and then change to duricef  Velour: No  Manual & Visual Inspection Of Driveline: No kinks or tears noted  Stabilization Device In Use: yes, sun securement device    Heartmate 3 Module Cable:  No yellow exposed and Attempted to unscrew modular cable to ensure it will be able to come lose in the event we ever need to change the modular cable while patient held the driveline in place so it would not move. Modular cable connection able to be unscrewed and re-tightened. Instructed pt to perform this weekly.  Frequency of Dressing Changes: every three days & silverlon kit   Pt In Need Of Management Kits?:yes -   2 Box  of silverlon kit  It is medically necessary to have VAD management kits in order to prevent infection or to assist in the healing of an infected DLES.    Patient MyChart Questionnaire:        Assessment/ Quality of Life [place]:   Complaints Of Nausea / Vomiting: None noted    Appearance and Frequency Of Stools: normal and formed without blood & daily  Color Of Urine: clear/yellow  Pain: NO  Coping/Depression/Anxiety: coping okay  Sleep Habits: 8-9 hrs /night  Sleep Aids: None noted  Showering: Yes, reminded to change dressing immediately after drying off  Self- Care I have no problems with self- care  Mobility I have no problems walking about  Usual Activities I have no problems with performing my usual activities  Activity/Exercise: walking   Driving: Yes. Reminded to pull over should there be an alarm before looking down at controller.  Additional Comments: Mexilitine is no longer covered by insurance (he switched to [employer] insurance); It is going to cost him $300. He went by EP clinic this morning and the  advised him to talk to VAD in clinic today.     Labs:    Chemistry        Component Value Date/Time     03/26/2025 0917     (L) 03/08/2025 0521    K 4.1 03/26/2025 0917    K 3.9 03/08/2025 0521     03/26/2025 0917     03/08/2025 0521    CO2 22 (L) 03/26/2025 0917    CO2 24 03/08/2025 0521    BUN 19 03/26/2025 0917    CREATININE 2.2 (H) 03/26/2025 0917     03/08/2025 0521        Component Value Date/Time    CALCIUM 9.7 03/26/2025 0917    CALCIUM 9.6 03/08/2025 0521    ALKPHOS 95 03/26/2025 0917    ALKPHOS 97 03/07/2025 0459    AST 82 (H) 03/26/2025 0917     (H) 03/07/2025 0459    ALT 88 (H) 03/26/2025 0917     (H) 03/07/2025 0459    BILITOT 0.4 03/26/2025 0917    BILITOT 0.6 03/07/2025 0459    ESTGFRAFRICA 37.6 (A) 07/31/2022 2027    EGFRNONAA 32.5 (A) 07/31/2022 2027            Magnesium    Date Value Ref Range Status   03/26/2025 1.9 1.6 - 2.6 mg/dL  Final       Lab Results   Component Value Date    WBC 3.32 (L) 03/26/2025    HGB 14.0 03/26/2025    HCT 44.3 03/26/2025    MCV 92 03/26/2025     03/26/2025       Lab Results   Component Value Date    INR 1.7 (H) 03/26/2025    INR 1.6 (H) 03/26/2025    INR 2.6 (H) 03/20/2025       BNP   Date Value Ref Range Status   03/26/2025 37 0 - 99 pg/mL Final     Comment:     Values of less than 100 pg/ml are consistent with non-CHF populations.    03/26/2025 34 0 - 99 pg/mL Final     Comment:     Values of less than 100 pg/ml are consistent with non-CHF populations.    03/07/2025 66 0 - 99 pg/mL Final     Comment:     Values of less than 100 pg/ml are consistent with non-CHF populations.   11/06/2024 326 (H) 0 - 99 pg/mL Final     Comment:     Values of less than 100 pg/ml are consistent with non-CHF populations.   07/25/2024 132 (H) 0 - 99 pg/mL Final     Comment:     Values of less than 100 pg/ml are consistent with non-CHF populations.       Lactate Dehydrogenase   Date Value Ref Range Status   03/26/2025 197 110 - 260 U/L Final     LD   Date Value Ref Range Status   03/08/2025 199 110 - 260 U/L Final     Comment:     Results are increased in hemolyzed samples.   03/07/2025 199 110 - 260 U/L Final     Comment:     Results are increased in hemolyzed samples.   03/06/2025 262 (H) 110 - 260 U/L Final     Comment:     Results are increased in hemolyzed samples.       Labs reviewed with patient: YES     Medication Reconciliation: per MA.  New Medication Detail Provided: no  Coumadin Managed by: [facility]    Education: Reviewed driveline care, emergency procedures, how to change the controller, alarms with patient, as well as discussed how to page the VAD coordinator in case of an emergency.   Educated patient/family that chest compressions are allowed in the event they are needed.    Reminded patient/caregiver not to touch their face and to cover their mouth when they cough or sneeze.   Vaccines: Pt informed  that we are encouraging all VAD patients to receive  vaccines and boosters. Informed pt that they can take tylenol but should avoid other NSAIDs.       Plans/Needs: Routine f/u. Stop ASA d/t new guidelines/ no indication to continue. Will discuss Mexilitine with Dr. Fay, EP. RTC in 4 months with FLP, PFTs, CT non-contrast (per debo Goldman for fibrosis).      Hurricane Season: No

## 2025-03-27 NOTE — TELEPHONE ENCOUNTER
Gaurav Rodriguez MD Rogers, Paul A, MD; Bridget Freeman RN; [facility] Rn Staff; Paz Bai RN; Ivette Guerrier RN; 1 other  Sounds good, thank you! We'll start low dose metoprolol      ----- Message -----  From: Antonio Fay MD  Sent: 3/26/2025   3:52 PM CDT  To: Paz Bai RN; Bridget Freeman RN; Jonatan*  Subject: RE: mexilitine                                  Beta-blocker would make more traditional sense from a VT standpoint. Mexitil is a sodium channel blocker whereas ranexa is both a sodium and potasium channel blocker. Ranexa would be off label. Would also be very concerned regarding tolerability/adverse events with his CKD/creatinine  ----- Message -----  From: Gaurav Rodriguez MD  Sent: 3/26/2025   2:48 PM CDT  To: Paz Bai RN; Antonio Fay MD; [place]  Subject: RE: mexilitine                                  Thanks Dr. Fay,    I asked about the ranexa more mechanistically. I have seen it used at times for VT prevention. His BP can probably tolerate the metoprolol but I asked about the ranexa more so because of how it may be more similar to mexiletine if he cannot get it because of insurance. Do you have a preference between metoprolol and mexitil?    Gaurav  ----- Message -----  From: Antonio Fay MD  Sent: 3/26/2025   2:32 PM CDT  To: Paz Bai RN; Bridget Freeman RN; Jonatan*  Subject: RE: mexilitine                                  Reasonable to try. Generally not a go to drug that we consider but since no other options then it is reasonable  ----- Message -----  From: Bridget Freeman RN  Sent: 3/26/2025   1:26 PM CDT  To: Paz Bai RN; Antonio Fay MD; Ivette*  Subject: RE: mexilitine                                  Thank you Dr. Fay. I spoke with Dr. Rodriguez as he saw Mr. Rihcards in clinic today.  Would there be any benefit to trying Ranexa instead of Metoprolol? His doppler pressure was 68 today.  ----- Message -----  From: Antonio Fay  MD RIMA  Sent: 3/26/2025  12:30 PM CDT  To: Paz Bai RN; Bridget Freeman RN; Jonatan*  Subject: RE: mexilitine                                  There is no alternative to it unfortunately. The only other thing would be if he could be on a small dose of metoprolol but would leave that up to [facility] staff  ----- Message -----  From: Bridget Freeman RN  Sent: 3/26/2025  10:03 AM CDT  To: Paz Bai RN; Antonio Fay MD; Ivette*  Subject: mexilitine                                      Good morning,    Mr. Richards is in VAD Clinic this morning and informed us that his insurance is no longer covering the mexilitine- it is now $300 a month. He only has one more capsule left. Can someone advise him on what to do?    Thank you,  Bridget Freeman RN, VAD Coordinator

## 2025-03-31 ENCOUNTER — ANTI-COAG VISIT (OUTPATIENT)
Dept: CARDIOLOGY | Facility: CLINIC | Age: 60
End: 2025-03-31
Payer: MEDICARE

## 2025-03-31 ENCOUNTER — LAB VISIT (OUTPATIENT)
Dept: LAB | Facility: HOSPITAL | Age: 60
End: 2025-03-31
Attending: INTERNAL MEDICINE
Payer: MEDICARE

## 2025-03-31 DIAGNOSIS — Z95.811 HEART REPLACED BY HEART ASSIST DEVICE: ICD-10-CM

## 2025-03-31 LAB
INR PPP: 2 (ref 0.8–1.2)
PROTHROMBIN TIME: 21.1 SECONDS (ref 9–12.5)

## 2025-03-31 PROCEDURE — 85610 PROTHROMBIN TIME: CPT

## 2025-03-31 PROCEDURE — 36415 COLL VENOUS BLD VENIPUNCTURE: CPT

## 2025-03-31 PROCEDURE — 93793 ANTICOAG MGMT PT WARFARIN: CPT | Mod: S$GLB,,,

## 2025-03-31 NOTE — PROGRESS NOTES
[facility]    03/31/2025    Tim Richards (59 y.o.) is followed by the [facility].      Assessment/Plan:    Tim Richards presents with a therapeutic INR. Goal INR: 2.0-3.0    Lab Results   Component Value Date    INR 2.0 (H) 03/31/2025    INR 1.7 (H) 03/26/2025    INR 1.6 (H) 03/26/2025       Assessment of patient findings per MA/LPN and chart review:   The following significant findings were found:   none    Recommendation for patient's warfarin regimen:   No change was made to warfarin therapy during this visit and patient has been instructed to continue their current warfarin regimen.    Recommended repeat INR in 1 week      Bryce MayesD, BCPS  Clinical Pharmacist - [facility]  Preferred Contact: Secure Messaging or In Basket Message

## 2025-04-04 ENCOUNTER — OFFICE VISIT (OUTPATIENT)
Dept: PSYCHIATRY | Facility: CLINIC | Age: 60
End: 2025-04-04
Payer: MEDICARE

## 2025-04-04 ENCOUNTER — DOCUMENTATION ONLY (OUTPATIENT)
Dept: CARDIOTHORACIC SURGERY | Facility: CLINIC | Age: 60
End: 2025-04-04
Payer: MEDICARE

## 2025-04-04 ENCOUNTER — PATIENT MESSAGE (OUTPATIENT)
Dept: TRANSPLANT | Facility: CLINIC | Age: 60
End: 2025-04-04
Payer: MEDICARE

## 2025-04-04 VITALS — BODY MASS INDEX: 31.72 KG/M2 | WEIGHT: 239.31 LBS | HEIGHT: 73 IN

## 2025-04-04 DIAGNOSIS — G47.00 INSOMNIA, UNSPECIFIED TYPE: ICD-10-CM

## 2025-04-04 DIAGNOSIS — F41.1 GENERALIZED ANXIETY DISORDER: ICD-10-CM

## 2025-04-04 DIAGNOSIS — F10.90 ALCOHOL USE DISORDER: Primary | Chronic | ICD-10-CM

## 2025-04-04 PROCEDURE — 99999 PR PBB SHADOW E&M-EST. PATIENT-LVL III: CPT | Mod: PBBFAC,,,

## 2025-04-04 RX ORDER — MIRTAZAPINE 45 MG/1
45 TABLET, FILM COATED ORAL NIGHTLY
Qty: 90 TABLET | Refills: 1 | Status: SHIPPED | OUTPATIENT
Start: 2025-04-04

## 2025-04-04 NOTE — PROGRESS NOTES
Admitted 9/24-10/5. Hospital course: Tim Richards is a 56 y.o. male s/p recent HM2 ->Hm3 LVAD exchange admitted for bleeding from his R groin where previous ECMO cannula was placed. Warfarin was reversed. 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 Groin cx revealed ESBL ecoli and proteus, requiring ertapenem until 11/11. Trach was removed prior to discharge.    
Kelly stated patient was discharged form rehab on 9/22/22 and was advised to take Warfarin 5mg daily, took 5mg 9/22/22. Kelly also reported patient is using [facility](info entered in anticoag episode),  is [place], no other changes noted.     
Spoke to Mrs. Richards, patient's wife, regarding discharge medication dosing. She confirmed a discharge warfarin dose of 5mg daily. INR scheduled for 10.06.2022, with Rich . Orders faxed.    
Was admitted 6/27-9/1. Hospital course: Tim Richards is a 56 y.o. male s/p HM2 lvad from 3.18 with underwent pump exchange to 3 on 7.13.22 for suspected pump thrombus. He was supported with ECMO. Of note, he was evaluated by endocrinology for hyperthyroidism and was started on synthroid 50mcg daily. Discharged to rehab    Discharged from rehab as of 9/22 on warfarin dose of 5mg daily. Had INR drawn in clinic 9/22. INR at goal. Medications and chart reviewed. No changes noted to necessitate adjustment of warfarin or follow-up plan. See calendar.       
No

## 2025-04-04 NOTE — PROGRESS NOTES
"Outpatient Psychiatry Follow-Up Visit   4/4/2025    Clinical Status of Patient:  Outpatient (Ambulatory)    Chief Complaint:  Tim Richards is a 59 y.o. male who presents today for follow-up of anxiety.  Met with patient.      Interval History and Content of Current Session 04/04/2025:  Pt is A+Ox 4.  Patients mood is "ok", affect appears congruent and appropriate. Pts thought process is normal and logical.  Pts speech is normal tone, normal rate, normal pitch, normal volume   Linear and logical, friendly and cooperative, normal eye contact, no psychomotor retardation.  Pt is calmly seated in chair during interview.     Patient states that he has been not forthcoming with alcohol consumption. Has denied drinking alcohol in the past. Today patient endorses drinking 1/3 of a liter of alcohol a day 5 days a week. Patient states that he has been doing this since 2019. Patient was hospitalized 3/6/2025 with concerns for alcohol withdrawals. Patient has not had a drink since hospitalization. Patient denies cravings. We extensively discussed [facility], patient amendable to enrolling in program. We discussed discontinuing xanax due to alcohol abuse, patient amendable. Patient was ussing xanax 1MG tablets 1-3 times a week for panic. Patient has discontinued xanax in the past without issues. Withdrawal precautions given to patient in clinic.    Pt reports taking medications as prescribed and behaving appropriately during interview today.  Denies SI/HI/AVH. Denies side effects of medications.  Pt reports sleeping OK and normal appetite.   Denies recreational drug use. Denies tobacco use, denies Vaping, denies Caffeine.      Standardized Screenings tools:   PHQ9: 8  ALONZO- 7: 5    Per [facility] Note: 3/6/2025  He reports drinking alcohol for about the past year and a half approximately 5 days a week drinks about a 3rd of a L bottle of liquor daily. He reports he does not drink 1st thing in the morning " "occasionally he will start drinking around 3:00 p.m.. He has never had withdrawals in the past but does report hand tremors that improve with drinking alcohol. He reports his father was an alcoholic and him in his brother have had issues with it in the past. He recognizes that his drinking is a problem and has resulted in multiple injuries from losing his balance and stumbling. He reports his last drink was Tuesday. He denies any anxiety, tremors, or hallucinations. On Tuesday he was drinking and lost his balance and fell and his wife reports trying to get up multiple times it continued to fall and fell and hit his face on the TV stand also have a right knee injury with swelling at the proximal area of the joint. He reports he is able to bear weight but has been using a cane to walk. [facility] [employer] [facility] without issue. He has been taking his medications as prescribed. He reports some ongoing anxiety especially related to getting shocked by his ICD. He was last shocked 4-1/2 months ago.     Per Psych Consult Note 3/7/2025  1/3-1/4 of a Liter tanner Sylvester or Gin. He acknowledges that he has an addiction to alcohol and that it has resulted in multiple prior injuries from falls and has significantly strained his marriage. Reports he had a fall on Tuesday 3/4 while inebriated and was unable to get up for an hour.    He denies hx of complicated withdrawal, only has hx of experiencing tremors.     He notes he is prx xanax for anxiety for which he takes on average ~3x a week. He typically takes it if he needs to drive somewhere or if he feels anxious about doing something. Endorses sometimes drinking a few hours after benzo administration.           Interim Events: 11/1/2024  Pt is A+Ox 4.  Patients mood is "ok", affect appears congruent and appropriate. Pts thought process is normal and logical.  Pts speech is normal tone, normal rate, normal pitch, normal volume   Linear and logical, friendly and cooperative, " "normal eye contact, no psychomotor retardation.  Pt is calmly seated in chair during interview.     Patient states that he's been doing OK since our previous appointment. Continues to take Remeron 30MG QHS and xanax 1-2 MG daily PRN panic. Patient states that he takes two xanax approximately 4 days out of the week.  Patient states that since our last appointment he has been more active with his family, states that instead of saying no to family outings he has been making an attempt. Patient says that he continues to struggle with fatigue due to his lvad. Patient stabilized on medication regimen, requesting refills.    Pt reports taking medications as prescribed and behaving appropriately during interview today.  Denies SI/HI/AVH. Denies side effects of medications.  Pt reports sleeping OK and normal appetite.   Denies recreational drug use. Pt reports 0 drinks per week, denies tobacco use, denies Vaping, denies Caffeine.      Standardized Screenings tools:   PHQ9: 13    Interim Events: 4/18/2024  Pt is A+Ox 4.  Patients mood is "not the best", affect appears blunted, sad. Pts thought process is normal and logical.  Pts speech is normal tone, normal rate, normal pitch, normal volume  Cooperative, normal eye contact, no psychomotor retardation.  Pt is calmly seated in chair during interview.     Patient states that he has not been doing well since our previous appointment. Endorses increased marital stress. Patient states that he is interested in talk therapy at this time.  continues to take Remeron 30MG QHS and Xanax 1 MG BID PRN anxiety (patient takes BID approximately 2 times a week). We have extensively discussed minimizing Xanax use.  Patient endorses multiple poor coping skills and an abnormal sleep wake cycle. Patient will go to bed at 3:00 in the morning and sleep until noon.    Pt reports taking medications as prescribed and behaving appropriately during interview today.  Denies SI/HI/AVH. Denies side effects " "of medications.  Pt reports sleeping OK and normal appetite.   Denies recreational drug use. Pt reports 0 drinks per week, denies tobacco use, denies Vaping, denies Caffeine.        Interim Events: 1/16/2024  Pt is A+Ox 4.  Patients mood is "ok", affect appears congruent and appropriate. Pts thought process is normal and logical.  Pts speech is normal tone, normal rate, normal pitch, normal volume   Linear and logical, friendly and cooperative, normal eye contact, no psychomotor retardation.  Pt is calmly seated in chair during interview.     Patient states that he's been doing OK since her previous appointment. Continues to take Remeron 30MG QHS, Xanax 0.5 to 1MG BID PRN panic. Patient states that he went to physical therapy for approximately 2 months and regained some lower body strength. Patient states that this has assisted him in ambulating with minimal assistance. Patient states that he has fallen a couple times since our last appointment due to tripping on things around the house. Patient states that his vision is poor and he has a difficult time seeing at night. States falls are not related to Xanax use. We discussed placing Night Lights around the house to better see.    Pt reports taking medications as prescribed and behaving appropriately during interview today.  Denies SI/HI/AVH. Denies side effects of medications.  Pt reports sleeping well and normal appetite.   Denies recreational drug use. Pt reports 0 drinks per week, denies tobacco use, denies Vaping, denies Caffeine.        Interim Events: 10/19/2023  Pt is A+Ox 4.  Patients mood is "good", affect appears congruent and appropriate. Pts thought process is normal and logical.  Pts speech is normal tone, normal rate, normal pitch, normal volume   Linear and logical, friendly and cooperative, normal eye contact, no psychomotor retardation.  Pt is calmly seated in chair during interview. Pt is casually dressed and well groomed.       Pt states that he " "has been doing pretty good since his last visit. Pt is taking Remeron 30 mg QHS and Xanax 0.5-1 mg BID prn (takes 1 pill twice daily). Pt's PCP (Dr. Daniel) recently prescribed him Ambien 12.5 mg nightly prn for insomnia. Denies any side effects from the medications. He reports that he has increased anxiety with driving especially with traffic. He states that he did PT for 3 months for atrophy which has helped a lot with his falls. Pt has had 3-4 falls in 6 months. He reports that his cardiologist is advising that he get on the heart transplant list which he is still debating. He states that he's had to have 2 heart pumps replaced in the past and if he has more issues with this he will get on the transplant list. Pt's support system includes his wife. Pt would like to continue on his current medication regimen.     Pt is having stress with his daughters.     Pt reports  taking medications as prescribed and behaving appropriately during interview today.  Denies SI/HI/AVH. Denies side effects of medications.  Pt reports sleeping normal and normal appetite.   Denies recreational drug use. Pt reports 0 drinks per week, denies tobacco use, denies Vaping, denies Caffeine.      Prior visit :  Psych Interview 03/17/2023:   Tim Richards is a 57 y.o. male with past psychiatric history of ALONZO  presented to for initial evaluation and treatment for anxiety .     Pt is A+Ox 4.  Patients mood is "ok", affect appears congruent and appropriate. Pts thought process is normal and logical.  Pts speech is normal tone, normal rate, normal pitch, normal volume   Linear and logical, friendly and cooperative, good eye contact, no psychomotor retardation.  Pt is calmly seated in chair during interview.  Pt is casually dressed and well groomed.  Pt has LVAD bag next to him during clinic visit.       Patient was previously being seen by Dr Krueger for ALONZO, currently taking Remeron 30 and Xanax 0.5mg - 1mg BID daily for panic. Patient " has an LVAD and is currently doing OK. Patient states that in 2022 he was hospitalized for three months due to heart failure complications. States that after hospitalization he went through outpatient rehab, states that he had to learn to walk again. Patient states that his support system consists of wife and LVAD team.  States that he has fell two times within the last month, states that both times he fell was due to tripping over his LVAD bag. States that he was not using Xanax at the time of the fall.  Patient states that takes Xanax 1mg two times per week for panic. Patient states that when he takes Xanax he stays seated for 1-2 hours to prevent.       Pt states that his biggest stressor includes possibly losing his disability. Patient states that disability needs to be renewed every six months, states that the disability office has been more adamant about not continuing his disability. Patient states that this has increase in his anxiety.  Pt states that if he loses his disability he does not know how he will make ends meet. Patient states that due to his medical conditions he is unable to work at this time.     Denies prior hx of psychiatric hospitalizations. Denies hx of suicide attempts. Pt denies hx self harm. Pt denies hx hallucinations.  Pt denies hx of eating disorders.   Pt endorses hx trauma - . Denies physical/sexual abuse. Pt denies symptoms including nightmares, hypervigilance, flashbacks, avoidance behaviors, and disassociation.     Reports depression today as 3/10, and anxiety as 2/10.     Reports sleeping 9 hrs per night, and poor appetite.   Denies SI/HI/AVH. Denies side effects of medications.  Pt states that there support consists of wife  Denies recreational drug use. Pt reports 0 drinks per week, denies tobacco use, denies Vaping, denies Caffeine.       Current Medication:  Xanax 0.5-1 mg Daily      Past meds   Paroxetine   Zoloft      DX:  Pt denies hx symptoms/episodes of miguel.     Admits  to symptoms of anxiety including excessive anxiety/worry/fear, more days than not, about numerous issues, difficulty controlling the worry, over thinking, rumination, restlessness, poor concentration, fatigue, and increased irritability. Denies panic attacks at this time.      Past Psychiatric History:   Previous Psychiatric Hospitalizations: NO  Previous Medication Trials: YES:      History of psychotherapy:  NO  Previous Suicide Attempts: NO  History of Violence:  NO  History of physical/sexual abuse: NO  Outpatient psychiatric provider(past): NO     Substance Abuse History:   Tobacco: NO  Alcohol: NO  Illicit Substances: NO  Detox/Rehab: NO     Neurological History:   Seizures: NO  Head trauma: YES: falls        Family Psychiatric History: No  Social History:  Developmental/Childhood:Achieved all developmental milestones timely  *Education:High School Diploma  Employment Status/Finances:Disabled   Relationship Status/Sexual Orientation: : Relationship intact  Children: 3  Housing Status: Home    history:  NO  Access to gun: YES:      Sikh: Judaism  Recreational activities: watch TV, work on truck  Person patient is closest to/confides in: Wife     Legal History:   Past Charges/Incarcerations:  No      Review of Systems     Review of Systems   Constitutional:  Negative for weight loss.   HENT:  Negative for tinnitus.    Eyes:  Negative for blurred vision.   Respiratory:  Negative for cough and shortness of breath.    Cardiovascular:  Negative for chest pain.   Gastrointestinal:  Negative for abdominal pain.   Genitourinary:  Negative for dysuria.   Musculoskeletal:  Negative for back pain and neck pain.   Skin:  Negative for rash.   Neurological:  Negative for dizziness, seizures and weakness.   Psychiatric/Behavioral:  Positive for depression. Negative for hallucinations, memory loss, substance abuse and suicidal ideas. The patient is nervous/anxious. The patient does not have insomnia.         Past Medical, Family and Social History: The patient's past medical, family and social history have been reviewed and updated as appropriate within the electronic medical record - see encounter notes.      Current Medications:   Medication List with Changes/Refills   Current Medications    AMIODARONE (PACERONE) 200 MG TAB    Take 2 tablets (400 mg total) by mouth once daily.    AMLODIPINE (NORVASC) 10 MG TABLET    Take 1 tablet (10 mg total) by mouth once daily.    CEFADROXIL (DURICEF) 500 MG CAP    Take 1 capsule (500 mg total) by mouth every 12 (twelve) hours.    CYANOCOBALAMIN 1,000 MCG/ML INJECTION    INJECT 1 ML (1,000 MCG TOTAL) INTO THE SKIN ONCE A WEEK. FOR 24 DOSES    DOXYCYCLINE (VIBRAMYCIN) 100 MG CAP    Take 1 capsule (100 mg total) by mouth 2 (two) times daily.    FERROUS GLUCONATE 324 MG (37.5 MG IRON) TAB TABLET    TAKE 1 TABLET BY MOUTH 2 TIMES DAILY WITH MEALS.    LEVOTHYROXINE (SYNTHROID) 125 MCG TABLET    Take 1 tablet (125 mcg total) by mouth before breakfast.    MAGNESIUM OXIDE (MAG-OX) 400 MG (241.3 MG MAGNESIUM) TABLET    Take 1 tablet (400 mg total) by mouth once daily.    METOPROLOL SUCCINATE (TOPROL-XL) 25 MG 24 HR TABLET    Take 0.5 tablets (12.5 mg total) by mouth 2 (two) times daily.    OMEGA-3 ACID ETHYL ESTERS (LOVAZA) 1 GRAM CAPSULE    Take 2 capsules (2 g total) by mouth 2 (two) times daily.    PANTOPRAZOLE (PROTONIX) 40 MG TABLET    Take 1 tablet (40 mg total) by mouth daily with lunch.    POTASSIUM CHLORIDE SA (K-DUR,KLOR-CON) 20 MEQ TABLET    TAKE 1 TABLET WITH EVERY LASIX 40 MG DOSE    TORSEMIDE (DEMADEX) 20 MG TAB    Take 1 tablets (20mg) on Mondays.    WARFARIN (COUMADIN) 5 MG TABLET    Take 1-1.5 tablets (5-7.5 mg total) by mouth Daily. As directed by the [facility]    ZOLPIDEM (AMBIEN CR) 12.5 MG CR TABLET    TAKE 1 TABLET (12.5 MG TOTAL) BY MOUTH NIGHTLY AS NEEDED FOR INSOMNIA.   Changed and/or Refilled Medications    Modified Medication Previous Medication     "MIRTAZAPINE (REMERON) 45 MG TABLET mirtazapine (REMERON) 30 MG tablet       Take 1 tablet (45 mg total) by mouth every evening.    Take 1 tablet (30 mg total) by mouth every evening.   Discontinued Medications    ALPRAZOLAM (XANAX) 1 MG TABLET    Take 1 tablet (1 mg total) by mouth 2 (two) times daily as needed for Anxiety.         Allergies:   Review of patient's allergies indicates:   Allergen Reactions    Lisinopril Anaphylaxis    Hydralazine     Hydralazine analogues      Chronic constipation, impotence, dizziness         Vitals   There were no vitals filed for this visit.           Labs/Imaging/Studies:   No results found for this or any previous visit (from the past 48 hours).       No results found for: "PHENYTOIN", "PHENOBARB", "VALPROATE", "CBMZ"    Compliance: yes    Side effects: None    Risk Parameters:  Patient reports no suicidal ideation  Patient reports no homicidal ideation  Patient reports no self-injurious behavior  Patient reports no violent behavior    Exam (detailed: at least 9 elements; comprehensive: all 15 elements)   Constitutional  Vitals:  Most recent vital signs, dated less than 90 days prior to this appointment, were reviewed.   Vitals:    04/04/25 1408   Weight: 108.6 kg (239 lb 5 oz)   Height: 6' 1" (1.854 m)            General:  unremarkable, age appropriate     Musculoskeletal  Muscle Strength/Tone:  no spasicity, no rigidity, no cogwheeling, no flaccidity, no paratonia, no dyskinesia, no dystonia, no tremor, no tic, no choreoathetosis, no atrophy   Gait & Station:  non-ataxic     Psychiatric  Speech:  no latency; no press   Mood & Affect:  steady  congruent and appropriate   Thought Process:  normal and logical   Associations:  intact   Thought Content:  normal, no suicidality, no homicidality, delusions, or paranoia   Insight:  intact, has awareness of illness   Judgement: behavior is adequate to circumstances, age appropriate   Orientation:  grossly intact, person, place, " situation, time/date   Memory: intact for content of interview, grossly intact, memory >3 objects at five mins, able to remember recent events- Yes, able to remember remote events- Yes   Language: grossly intact, able to name, able to repeat   Attention Span & Concentration:  able to focus, completed tasks   Fund of Knowledge:  intact and appropriate to age and level of education, familiar with aspects of current personal life     Assessment and Diagnosis   Status/Progress: Based on the examination today, the patient's problem(s) is/are well controlled.  New problems have not been presented today.   Co-morbidities are complicating management of the primary condition.     General Impression:      ICD-10-CM ICD-9-CM   1. Alcohol use disorder  F10.90 V49.89   2. Insomnia, unspecified type  G47.00 780.52   3. Generalized anxiety disorder  F41.1 300.02         Intervention/Counseling/Treatment Plan      Mood   Increase Remeron 45mg QHS - targeting anxiety, depression, and insomnia  Stop Xanax   - Pt was taking 1-3 tablets of Xanax 1mg per week  - Due to alcohol abuse will stop xanax  - Pt did not experience withdrawals from discontinuing alcohol. Last drink 3 weeks ago  - Pt extensively educated on Withdrawal symptoms.      Encouraged Pt to attend [facility].      Offered Talk Therapy Referral, Pt refused.       Discussed diagnosis, risks and benefits of proposed treatment above vs alternative treatments vs no treatment, and potential side effects of these treatments, and the inherent unpredictability of individual response to treatment.  The patient expresses understanding and gives informed consent to pursue treatment.  The potential benefits outweigh the potential risks. Patient has no other questions. Risks/adverse effects discussed at this time including but not limited to: GI side effects, sexual dysfunction, activation vs sedation, triggering of suicidal thoughts, and serotonin syndrome.   Discussed  with patient, the potential adverse effects of Benzodiazepines, including, but not limited to, drowsiness, dizziness, risk of falls, and abuse potential. Counseled patient on avoiding alcohol, while using this medication, due to the risk of respiratory depression. Patient instructed not to operate any heavy machinery or drive a vehicle while on this medication. Informed patient of the risks of continuous benzodiazepine use, including tolerance, dependence and withdrawals that may be life threatening, upon abrupt cessation. Also advised patient not to take benzodiazepines with opiates and/or other sedatives. The patient expresses an understanding of the above as well as the inherent unpredictability of said treatment.  The patient agrees that, currently, the benefits outweigh the risks, and would like to pursue said treatment at this time.      Serotonin syndrome   Mental status changes can include anxiety, restlessness, disorientation, and agitated delirium.    Autonomic manifestations can include diaphoresis, tachycardia, hyperthermia, hypertension, vomiting, and diarrhea   Neuromuscular hyperactivity can manifest as tremor, myoclonus, hyperreflexia, rigidity, hyperthermia, seizure, and bilateral Babinski sign.   Pt was informed that if they experience any of these symptoms to go the ED.     Difficulty Sleeping Behavioral Modification:  Implement stimulus control: [place] bedroom for sleep only. Leave bedroom when frustrated from not sleeping. Engage in relaxation before returning. Engage in activities during the day. AVOID >7-8 h time in bed  Avoid clock watching  Avoid thinking/worrying about sleep when trying to fall asleep  Limit caffeinated consumption  Make sure the bedroom is dark, quiet and cool    Safety Plan   Patient voices understanding and agreement with this plan  Provided crisis numbers  Encouraged patient to keep future appointments.  Instructed patient to call or message with questions or  concerns  In the event of an emergency, including suicidal ideation, patient was advised to go to the emergency room and/or call 911    Return to Clinic: 1 month    Psychotherapy:  Target symptoms: anxiety   Why chosen therapy is appropriate versus another modality: relevant to diagnosis, evidence based practice  Outcome monitoring methods: self-report, observation  Therapeutic intervention type: insight oriented psychotherapy, interactive psychotherapy  Topics discussed/themes: relationships difficulties, stress related to medical comorbidities, difficulty managing affect in interpersonal relationships, building skills sets for symptom management  The patient's response to the intervention is guarded. The patient's progress toward treatment goals is fair.   Duration of intervention: 15 minutes.    Total face to face time: 30 min  Total time (chart review, patient contact, documentation): 35 min  Pt required extensive education    A diagnostic psychiatric evaluation was performed and responsiveness to treatment was assessed.  The patient demonstrates adequate ability/capacity to respond to treatment.    Osmar Mejia PA-C    *This note has been prepared using a combination of a dictation device and typing.  It has been checked for errors but some errors may still exist within the note as a result of speech recognition errors and/or typographical errors.

## 2025-04-04 NOTE — PROGRESS NOTES
Pt not scheduled in LVAD clinic between 11/13/24 and 3/13/25, therefore, pt's 30 month INTERMACS follow-up surveys and 6 minute walk were not initiated.

## 2025-04-07 ENCOUNTER — ANTI-COAG VISIT (OUTPATIENT)
Dept: CARDIOLOGY | Facility: CLINIC | Age: 60
End: 2025-04-07
Payer: MEDICARE

## 2025-04-07 ENCOUNTER — LAB VISIT (OUTPATIENT)
Dept: LAB | Facility: HOSPITAL | Age: 60
End: 2025-04-07
Attending: INTERNAL MEDICINE
Payer: MEDICARE

## 2025-04-07 DIAGNOSIS — Z95.811 HEART REPLACED BY HEART ASSIST DEVICE: ICD-10-CM

## 2025-04-07 LAB
INR PPP: 1.9 (ref 0.8–1.2)
PROTHROMBIN TIME: 20.3 SECONDS (ref 9–12.5)

## 2025-04-07 PROCEDURE — 85610 PROTHROMBIN TIME: CPT

## 2025-04-07 PROCEDURE — 36415 COLL VENOUS BLD VENIPUNCTURE: CPT

## 2025-04-07 NOTE — PROGRESS NOTES
[facility]    2025 3:46 PM    Assessment/Plan:    Patient presents today with subtherapeutic  INR.    Assessment of patient findings and chart review: reports broccoli intake     Recommendation for patient's warfarin regimen: Boost dose today to 7.5mg then resume current maintenance dose    Recommend repeat INR Thursday  _________________________________________________________________    Tim Richards (59 y.o.) is followed by the [facility].    Anticoagulation Summary  As of 2025      INR goal:  2.0-3.0   TTR:  69.7% (5.3 y)   INR used for dosin.9 (2025)   Warfarin maintenance plan:  5 mg (5 mg x 1) every day   Weekly warfarin total:  35 mg   Plan last modified:  Pearl Mendez, PharmD (10/29/2024)   Next INR check:  4/10/2025   Target end date:  --    Indications    LVAD (left ventricular assist device) present (Resolved) [Z95.811]  Anticoagulation monitoring  INR range 2-3 (Resolved) [Z79.01]                 Anticoagulation Episode Summary       INR check location:  Clinic Lab    Preferred lab:  --    Send INR reminders to:  [facility] COUMADIN LVAD    Comments:  LVAD/ [facility] [place] Results:[phone] [id number] Phone: [phone] [phone]/ Bridge:NO(h/o bleeds) see 3/12 encounter for meter process          Anticoagulation Care Providers       Provider Role Specialty Phone number    Antonio Hadley Jr., MD [facility] Cardiology [phone]

## 2025-04-10 ENCOUNTER — ANTI-COAG VISIT (OUTPATIENT)
Dept: CARDIOLOGY | Facility: CLINIC | Age: 60
End: 2025-04-10
Payer: MEDICARE

## 2025-04-10 ENCOUNTER — LAB VISIT (OUTPATIENT)
Dept: LAB | Facility: HOSPITAL | Age: 60
End: 2025-04-10
Attending: INTERNAL MEDICINE
Payer: MEDICARE

## 2025-04-10 DIAGNOSIS — Z95.811 HEART REPLACED BY HEART ASSIST DEVICE: ICD-10-CM

## 2025-04-10 LAB
INR PPP: 1.9 (ref 0.8–1.2)
PROTHROMBIN TIME: 20.9 SECONDS (ref 9–12.5)

## 2025-04-10 PROCEDURE — 85610 PROTHROMBIN TIME: CPT

## 2025-04-10 PROCEDURE — 36415 COLL VENOUS BLD VENIPUNCTURE: CPT

## 2025-04-10 NOTE — PROGRESS NOTES
Patient reports correct Warfarin dose, started Cefadroxil 500mg BID on 3/28/25, no other changes reported.

## 2025-04-10 NOTE — PROGRESS NOTES
[facility]    04/10/2025 4:21 PM    Assessment/Plan:    Patient presents today with subtherapeutic  INR.    Assessment of patient findings and chart review: no significant findings     Recommendation for patient's warfarin regimen: Increase maintenance dose    Recommend repeat INR Monday  _________________________________________________________________    Tim Richards (59 y.o.) is followed by the [facility].    Anticoagulation Summary  As of 4/10/2025      INR goal:  2.0-3.0   TTR:  69.6% (5.4 y)   INR used for dosin.9 (4/10/2025)   Warfarin maintenance plan:  7.5 mg (5 mg x 1.5) every Thu; 5 mg (5 mg x 1) all other days   Weekly warfarin total:  37.5 mg   Plan last modified:  Pearl Mendez, PharmD (4/10/2025)   Next INR check:  2025   Target end date:  --    Indications    LVAD (left ventricular assist device) present (Resolved) [Z95.811]  Anticoagulation monitoring  INR range 2-3 (Resolved) [Z79.01]                 Anticoagulation Episode Summary       INR check location:  Clinic Lab    Preferred lab:  --    Send INR reminders to:  [facility] COUMADIN LVAD    Comments:  LVAD/ [facility]/Lab [place] Results:[phone] Acct# [id number] Phone: [phone] [phone]/ Bridge:NO(h/o bleeds) see 3/12 encounter for meter process          Anticoagulation Care Providers       Provider Role Specialty Phone number    Antonio Hadley Jr., MD [facility] Cardiology [phone]

## 2025-04-11 ENCOUNTER — PATIENT MESSAGE (OUTPATIENT)
Dept: TRANSPLANT | Facility: CLINIC | Age: 60
End: 2025-04-11
Payer: MEDICARE

## 2025-04-14 ENCOUNTER — LAB VISIT (OUTPATIENT)
Dept: LAB | Facility: HOSPITAL | Age: 60
End: 2025-04-14
Attending: INTERNAL MEDICINE
Payer: MEDICARE

## 2025-04-14 ENCOUNTER — ANTI-COAG VISIT (OUTPATIENT)
Dept: CARDIOLOGY | Facility: CLINIC | Age: 60
End: 2025-04-14
Payer: MEDICARE

## 2025-04-14 DIAGNOSIS — Z95.811 HEART REPLACED BY HEART ASSIST DEVICE: ICD-10-CM

## 2025-04-14 LAB
INR PPP: 2.5 (ref 0.8–1.2)
PROTHROMBIN TIME: 25.9 SECONDS (ref 9–12.5)

## 2025-04-14 PROCEDURE — 93793 ANTICOAG MGMT PT WARFARIN: CPT | Mod: S$GLB,,,

## 2025-04-14 PROCEDURE — 85610 PROTHROMBIN TIME: CPT

## 2025-04-14 NOTE — PROGRESS NOTES
[facility]    2025 3:52 PM    Assessment/Plan:    Patient presents today with therapeutic INR.    Assessment of patient findings and chart review: no significant findings     Recommendation for patient's warfarin regimen: Continue current maintenance dose    Recommend repeat INR in 1 week  _________________________________________________________________    Tim Richards (59 y.o.) is followed by the [facility].    Anticoagulation Summary  As of 2025      INR goal:  2.0-3.0   TTR:  69.6% (5.4 y)   INR used for dosin.5 (2025)   Warfarin maintenance plan:  7.5 mg (5 mg x 1.5) every Thu; 5 mg (5 mg x 1) all other days   Weekly warfarin total:  37.5 mg   Plan last modified:  Pearl Mendez, PharmD (4/10/2025)   Next INR check:  2025   Target end date:  --    Indications    LVAD (left ventricular assist device) present (Resolved) [Z95.811]  Anticoagulation monitoring  INR range 2-3 (Resolved) [Z79.01]                 Anticoagulation Episode Summary       INR check location:  Clinic Lab    Preferred lab:  --    Send INR reminders to:  [facility] COUMADIN LVAD    Comments:  LVAD/ [facility]Lab [place] Results:[phone] Acct# [id number] Phone: [phone] [phone]/ Bridge:NO(h/o bleeds) see 3/12 encounter for meter process          Anticoagulation Care Providers       Provider Role Specialty Phone number    Antonio Hadley Jr., MD [facility] Cardiology [phone]

## 2025-04-20 ENCOUNTER — E-VISIT (OUTPATIENT)
Dept: FAMILY MEDICINE | Facility: CLINIC | Age: 60
End: 2025-04-20
Payer: MEDICARE

## 2025-04-20 ENCOUNTER — HOSPITAL ENCOUNTER (INPATIENT)
Facility: HOSPITAL | Age: 60
LOS: 3 days | Discharge: HOME | DRG: 309 | End: 2025-04-23
Attending: EMERGENCY MEDICINE | Admitting: INTERNAL MEDICINE
Payer: MEDICARE

## 2025-04-20 DIAGNOSIS — Z79.01 ANTICOAGULATION MONITORING, INR RANGE 2-3: ICD-10-CM

## 2025-04-20 DIAGNOSIS — I49.9 ARRHYTHMIA: ICD-10-CM

## 2025-04-20 DIAGNOSIS — I47.20 VENTRICULAR TACHYCARDIA: ICD-10-CM

## 2025-04-20 DIAGNOSIS — I50.42 CHRONIC COMBINED SYSTOLIC AND DIASTOLIC HEART FAILURE: Chronic | ICD-10-CM

## 2025-04-20 DIAGNOSIS — I47.20 V-TACH: Primary | ICD-10-CM

## 2025-04-20 DIAGNOSIS — Z95.811 LVAD (LEFT VENTRICULAR ASSIST DEVICE) PRESENT: ICD-10-CM

## 2025-04-20 DIAGNOSIS — I47.20 VT (VENTRICULAR TACHYCARDIA): ICD-10-CM

## 2025-04-20 DIAGNOSIS — I47.29 VENTRICULAR TACHYCARDIA, INCESSANT: Primary | ICD-10-CM

## 2025-04-20 LAB
ABSOLUTE EOSINOPHIL (OHS): 0.04 K/UL
ABSOLUTE EOSINOPHIL (OHS): 0.08 K/UL
ABSOLUTE MONOCYTE (OHS): 0.44 K/UL (ref 0.3–1)
ABSOLUTE MONOCYTE (OHS): 0.66 K/UL (ref 0.3–1)
ABSOLUTE NEUTROPHIL COUNT (OHS): 2.38 K/UL (ref 1.8–7.7)
ABSOLUTE NEUTROPHIL COUNT (OHS): 2.68 K/UL (ref 1.8–7.7)
ALBUMIN SERPL BCP-MCNC: 3.3 G/DL (ref 3.5–5.2)
ALBUMIN SERPL BCP-MCNC: 3.9 G/DL (ref 3.5–5.2)
ALP SERPL-CCNC: 113 UNIT/L (ref 40–150)
ALT SERPL W/O P-5'-P-CCNC: 19 UNIT/L (ref 10–44)
ANION GAP (OHS): 11 MMOL/L (ref 8–16)
ANION GAP (OHS): 14 MMOL/L (ref 8–16)
APTT PPP: 56.4 SECONDS (ref 21–32)
AST SERPL-CCNC: 38 UNIT/L (ref 11–45)
BASOPHILS # BLD AUTO: 0.02 K/UL
BASOPHILS # BLD AUTO: 0.03 K/UL
BASOPHILS NFR BLD AUTO: 0.4 %
BASOPHILS NFR BLD AUTO: 0.6 %
BILIRUB SERPL-MCNC: 0.3 MG/DL (ref 0.1–1)
BIPAP: 0
BNP SERPL-MCNC: 189 PG/ML (ref 0–99)
BUN SERPL-MCNC: 23 MG/DL (ref 6–20)
BUN SERPL-MCNC: 24 MG/DL (ref 6–20)
BUN SERPL-MCNC: 24 MG/DL (ref 6–30)
CALCIUM SERPL-MCNC: 8.7 MG/DL (ref 8.7–10.5)
CALCIUM SERPL-MCNC: 8.9 MG/DL (ref 8.7–10.5)
CHLORIDE SERPL-SCNC: 100 MMOL/L (ref 95–110)
CHLORIDE SERPL-SCNC: 100 MMOL/L (ref 95–110)
CHLORIDE SERPL-SCNC: 101 MMOL/L (ref 95–110)
CO2 SERPL-SCNC: 22 MMOL/L (ref 23–29)
CO2 SERPL-SCNC: 25 MMOL/L (ref 23–29)
CORRECTED TEMPERATURE (PCO2): 43.8 MMHG
CORRECTED TEMPERATURE (PH): 7.37
CORRECTED TEMPERATURE (PO2): 86.3 MMHG
CREAT SERPL-MCNC: 2.1 MG/DL (ref 0.5–1.4)
CREAT SERPL-MCNC: 2.3 MG/DL (ref 0.5–1.4)
CREAT SERPL-MCNC: 2.7 MG/DL (ref 0.5–1.4)
ERYTHROCYTE [DISTWIDTH] IN BLOOD BY AUTOMATED COUNT: 14.8 % (ref 11.5–14.5)
ERYTHROCYTE [DISTWIDTH] IN BLOOD BY AUTOMATED COUNT: 15 % (ref 11.5–14.5)
ETHANOL SERPL-MCNC: 84 MG/DL
FIO2: 21 %
GFR SERPLBLD CREATININE-BSD FMLA CKD-EPI: 32 ML/MIN/1.73/M2
GFR SERPLBLD CREATININE-BSD FMLA CKD-EPI: 36 ML/MIN/1.73/M2
GLUCOSE SERPL-MCNC: 83 MG/DL (ref 70–110)
GLUCOSE SERPL-MCNC: 87 MG/DL (ref 70–110)
GLUCOSE SERPL-MCNC: 91 MG/DL (ref 70–110)
HCT VFR BLD AUTO: 36.5 % (ref 40–54)
HCT VFR BLD AUTO: 41.7 % (ref 40–54)
HCT VFR BLD CALC: 43 %PCV (ref 36–54)
HCV AB SERPL QL IA: NORMAL
HGB BLD-MCNC: 11.4 GM/DL (ref 14–18)
HGB BLD-MCNC: 13.2 GM/DL (ref 14–18)
HIV 1+2 AB+HIV1 P24 AG SERPL QL IA: REACTIVE
IMM GRANULOCYTES # BLD AUTO: 0.01 K/UL (ref 0–0.04)
IMM GRANULOCYTES # BLD AUTO: 0.01 K/UL (ref 0–0.04)
IMM GRANULOCYTES NFR BLD AUTO: 0.1 % (ref 0–0.5)
IMM GRANULOCYTES NFR BLD AUTO: 0.3 % (ref 0–0.5)
INR PPP: 2.7 (ref 0.8–1.2)
LYMPHOCYTES # BLD AUTO: 0.58 K/UL (ref 1–4.8)
LYMPHOCYTES # BLD AUTO: 3.26 K/UL (ref 1–4.8)
MAGNESIUM SERPL-MCNC: 1.9 MG/DL (ref 1.6–2.6)
MAGNESIUM SERPL-MCNC: 2 MG/DL (ref 1.6–2.6)
MAGNESIUM SERPL-MCNC: 2.2 MG/DL (ref 1.6–2.6)
MCH RBC QN AUTO: 27.9 PG (ref 27–31)
MCH RBC QN AUTO: 28.8 PG (ref 27–31)
MCHC RBC AUTO-ENTMCNC: 31.2 G/DL (ref 32–36)
MCHC RBC AUTO-ENTMCNC: 31.7 G/DL (ref 32–36)
MCV RBC AUTO: 90 FL (ref 82–98)
MCV RBC AUTO: 91 FL (ref 82–98)
NUCLEATED RBC (/100WBC) (OHS): 0 /100 WBC
NUCLEATED RBC (/100WBC) (OHS): 0 /100 WBC
OHS QRS DURATION: 180 MS
OHS QTC CALCULATION: 752 MS
PCO2 BLDA: 43.8 MMHG (ref 35–45)
PH SMN: 7.37 [PH] (ref 7.3–7.45)
PHOSPHATE SERPL-MCNC: 2.8 MG/DL (ref 2.7–4.5)
PHOSPHATE SERPL-MCNC: 2.9 MG/DL (ref 2.7–4.5)
PHOSPHATE SERPL-MCNC: 3.1 MG/DL (ref 2.7–4.5)
PLATELET # BLD AUTO: 188 K/UL (ref 150–450)
PLATELET # BLD AUTO: 244 K/UL (ref 150–450)
PMV BLD AUTO: 9.7 FL (ref 9.2–12.9)
PMV BLD AUTO: 9.9 FL (ref 9.2–12.9)
PO2 BLDA: 86.3 MMHG (ref 80–100)
POC BASE DEFICIT: -0.3 MMOL/L (ref -2–2)
POC HCO3: 24.1 MMOL/L (ref 24–28)
POC IONIZED CALCIUM: 1.03 MMOL/L (ref 1.06–1.42)
POC PERFORMED BY: NORMAL
POC SATURATED O2: 96.5 % (ref 95–100)
POC TCO2 (MEASURED): 23 MMOL/L (ref 23–29)
POC TEMPERATURE: 37 C
POCT GLUCOSE: 98 MG/DL (ref 70–110)
POTASSIUM BLD-SCNC: 3.5 MMOL/L (ref 3.5–5.1)
POTASSIUM SERPL-SCNC: 3.5 MMOL/L (ref 3.5–5.1)
POTASSIUM SERPL-SCNC: 4.3 MMOL/L (ref 3.5–5.1)
PROT SERPL-MCNC: 8.8 GM/DL (ref 6–8.4)
PROTHROMBIN TIME: 27.9 SECONDS (ref 9–12.5)
RBC # BLD AUTO: 4.08 M/UL (ref 4.6–6.2)
RBC # BLD AUTO: 4.59 M/UL (ref 4.6–6.2)
RELATIVE EOSINOPHIL (OHS): 1.2 %
RELATIVE EOSINOPHIL (OHS): 1.2 %
RELATIVE LYMPHOCYTE (OHS): 16.7 % (ref 18–48)
RELATIVE LYMPHOCYTE (OHS): 48.5 % (ref 18–48)
RELATIVE MONOCYTE (OHS): 12.7 % (ref 4–15)
RELATIVE MONOCYTE (OHS): 9.8 % (ref 4–15)
RELATIVE NEUTROPHIL (OHS): 40 % (ref 38–73)
RELATIVE NEUTROPHIL (OHS): 68.5 % (ref 38–73)
SAMPLE: ABNORMAL
SODIUM BLD-SCNC: 136 MMOL/L (ref 136–145)
SODIUM SERPL-SCNC: 136 MMOL/L (ref 136–145)
SODIUM SERPL-SCNC: 136 MMOL/L (ref 136–145)
SPECIMEN SOURCE: NORMAL
T4 FREE SERPL-MCNC: 1.35 NG/DL (ref 0.71–1.51)
TROPONIN I SERPL HS-MCNC: 53 NG/L
TROPONIN I SERPL HS-MCNC: 61 NG/L
TSH SERPL-ACNC: 4.71 UIU/ML (ref 0.4–4)
WBC # BLD AUTO: 3.47 K/UL (ref 3.9–12.7)
WBC # BLD AUTO: 6.72 K/UL (ref 3.9–12.7)

## 2025-04-20 PROCEDURE — 96365 THER/PROPH/DIAG IV INF INIT: CPT | Mod: 59

## 2025-04-20 PROCEDURE — 80069 RENAL FUNCTION PANEL: CPT

## 2025-04-20 PROCEDURE — 96368 THER/DIAG CONCURRENT INF: CPT

## 2025-04-20 PROCEDURE — 63600175 PHARM REV CODE 636 W HCPCS: Performed by: STUDENT IN AN ORGANIZED HEALTH CARE EDUCATION/TRAINING PROGRAM

## 2025-04-20 PROCEDURE — 84100 ASSAY OF PHOSPHORUS: CPT | Performed by: STUDENT IN AN ORGANIZED HEALTH CARE EDUCATION/TRAINING PROGRAM

## 2025-04-20 PROCEDURE — 83735 ASSAY OF MAGNESIUM: CPT

## 2025-04-20 PROCEDURE — 84484 ASSAY OF TROPONIN QUANT: CPT

## 2025-04-20 PROCEDURE — 82077 ASSAY SPEC XCP UR&BREATH IA: CPT | Performed by: STUDENT IN AN ORGANIZED HEALTH CARE EDUCATION/TRAINING PROGRAM

## 2025-04-20 PROCEDURE — 96376 TX/PRO/DX INJ SAME DRUG ADON: CPT

## 2025-04-20 PROCEDURE — 27000248 HC VAD-ADDITIONAL DAY

## 2025-04-20 PROCEDURE — 99900035 HC TECH TIME PER 15 MIN (STAT)

## 2025-04-20 PROCEDURE — 63600175 PHARM REV CODE 636 W HCPCS

## 2025-04-20 PROCEDURE — 87389 HIV-1 AG W/HIV-1&-2 AB AG IA: CPT | Performed by: PHYSICIAN ASSISTANT

## 2025-04-20 PROCEDURE — 84439 ASSAY OF FREE THYROXINE: CPT | Performed by: EMERGENCY MEDICINE

## 2025-04-20 PROCEDURE — 80053 COMPREHEN METABOLIC PANEL: CPT

## 2025-04-20 PROCEDURE — 25000003 PHARM REV CODE 250: Performed by: STUDENT IN AN ORGANIZED HEALTH CARE EDUCATION/TRAINING PROGRAM

## 2025-04-20 PROCEDURE — 63600175 PHARM REV CODE 636 W HCPCS: Performed by: EMERGENCY MEDICINE

## 2025-04-20 PROCEDURE — 94761 N-INVAS EAR/PLS OXIMETRY MLT: CPT | Mod: XB

## 2025-04-20 PROCEDURE — 99291 CRITICAL CARE FIRST HOUR: CPT | Mod: ICN,,, | Performed by: INTERNAL MEDICINE

## 2025-04-20 PROCEDURE — 36600 WITHDRAWAL OF ARTERIAL BLOOD: CPT

## 2025-04-20 PROCEDURE — 85610 PROTHROMBIN TIME: CPT | Performed by: STUDENT IN AN ORGANIZED HEALTH CARE EDUCATION/TRAINING PROGRAM

## 2025-04-20 PROCEDURE — 85025 COMPLETE CBC W/AUTO DIFF WBC: CPT | Performed by: STUDENT IN AN ORGANIZED HEALTH CARE EDUCATION/TRAINING PROGRAM

## 2025-04-20 PROCEDURE — 80321 ALCOHOLS BIOMARKERS 1OR 2: CPT | Performed by: STUDENT IN AN ORGANIZED HEALTH CARE EDUCATION/TRAINING PROGRAM

## 2025-04-20 PROCEDURE — 96375 TX/PRO/DX INJ NEW DRUG ADDON: CPT

## 2025-04-20 PROCEDURE — 20000000 HC ICU ROOM

## 2025-04-20 PROCEDURE — 85730 THROMBOPLASTIN TIME PARTIAL: CPT | Performed by: STUDENT IN AN ORGANIZED HEALTH CARE EDUCATION/TRAINING PROGRAM

## 2025-04-20 PROCEDURE — 99291 CRITICAL CARE FIRST HOUR: CPT

## 2025-04-20 PROCEDURE — 82803 BLOOD GASES ANY COMBINATION: CPT

## 2025-04-20 PROCEDURE — 83880 ASSAY OF NATRIURETIC PEPTIDE: CPT

## 2025-04-20 PROCEDURE — 83735 ASSAY OF MAGNESIUM: CPT | Performed by: STUDENT IN AN ORGANIZED HEALTH CARE EDUCATION/TRAINING PROGRAM

## 2025-04-20 PROCEDURE — 25000003 PHARM REV CODE 250

## 2025-04-20 PROCEDURE — 99222 1ST HOSP IP/OBS MODERATE 55: CPT | Mod: GC,ICN,, | Performed by: INTERNAL MEDICINE

## 2025-04-20 PROCEDURE — 86803 HEPATITIS C AB TEST: CPT | Performed by: PHYSICIAN ASSISTANT

## 2025-04-20 PROCEDURE — 84443 ASSAY THYROID STIM HORMONE: CPT | Performed by: EMERGENCY MEDICINE

## 2025-04-20 PROCEDURE — 85025 COMPLETE CBC W/AUTO DIFF WBC: CPT

## 2025-04-20 RX ORDER — POTASSIUM CHLORIDE 7.45 MG/ML
10 INJECTION INTRAVENOUS ONCE
Status: COMPLETED | OUTPATIENT
Start: 2025-04-20 | End: 2025-04-20

## 2025-04-20 RX ORDER — LORAZEPAM 2 MG/ML
1 INJECTION INTRAMUSCULAR
Status: COMPLETED | OUTPATIENT
Start: 2025-04-20 | End: 2025-04-20

## 2025-04-20 RX ORDER — LANOLIN ALCOHOL/MO/W.PET/CERES
400 CREAM (GRAM) TOPICAL DAILY
Status: DISCONTINUED | OUTPATIENT
Start: 2025-04-20 | End: 2025-04-23 | Stop reason: HOSPADM

## 2025-04-20 RX ORDER — METOPROLOL TARTRATE 1 MG/ML
5 INJECTION, SOLUTION INTRAVENOUS
Status: DISPENSED | OUTPATIENT
Start: 2025-04-20 | End: 2025-04-20

## 2025-04-20 RX ORDER — FUROSEMIDE 10 MG/ML
40 INJECTION INTRAMUSCULAR; INTRAVENOUS ONCE
Status: DISCONTINUED | OUTPATIENT
Start: 2025-04-20 | End: 2025-04-20

## 2025-04-20 RX ORDER — POTASSIUM CHLORIDE 750 MG/1
30 CAPSULE, EXTENDED RELEASE ORAL ONCE
Status: COMPLETED | OUTPATIENT
Start: 2025-04-20 | End: 2025-04-20

## 2025-04-20 RX ORDER — PANTOPRAZOLE SODIUM 40 MG/1
40 TABLET, DELAYED RELEASE ORAL
Status: DISCONTINUED | OUTPATIENT
Start: 2025-04-20 | End: 2025-04-23 | Stop reason: HOSPADM

## 2025-04-20 RX ORDER — AMIODARONE HYDROCHLORIDE 150 MG/3ML
300 INJECTION, SOLUTION INTRAVENOUS
Status: DISCONTINUED | OUTPATIENT
Start: 2025-04-20 | End: 2025-04-20

## 2025-04-20 RX ORDER — MIRTAZAPINE 15 MG/1
45 TABLET, FILM COATED ORAL NIGHTLY
Status: DISCONTINUED | OUTPATIENT
Start: 2025-04-20 | End: 2025-04-23 | Stop reason: HOSPADM

## 2025-04-20 RX ORDER — AMLODIPINE BESYLATE 10 MG/1
10 TABLET ORAL DAILY
Status: DISCONTINUED | OUTPATIENT
Start: 2025-04-20 | End: 2025-04-23 | Stop reason: HOSPADM

## 2025-04-20 RX ORDER — FUROSEMIDE 10 MG/ML
40 INJECTION INTRAMUSCULAR; INTRAVENOUS ONCE
Status: COMPLETED | OUTPATIENT
Start: 2025-04-20 | End: 2025-04-20

## 2025-04-20 RX ORDER — LIDOCAINE HYDROCHLORIDE 20 MG/ML
100 INJECTION INTRAVENOUS ONCE
Status: DISCONTINUED | OUTPATIENT
Start: 2025-04-20 | End: 2025-04-23 | Stop reason: HOSPADM

## 2025-04-20 RX ORDER — MAGNESIUM SULFATE HEPTAHYDRATE 40 MG/ML
2 INJECTION, SOLUTION INTRAVENOUS ONCE
Status: COMPLETED | OUTPATIENT
Start: 2025-04-20 | End: 2025-04-20

## 2025-04-20 RX ORDER — MUPIROCIN 20 MG/G
OINTMENT TOPICAL 2 TIMES DAILY
Status: DISCONTINUED | OUTPATIENT
Start: 2025-04-20 | End: 2025-04-23 | Stop reason: HOSPADM

## 2025-04-20 RX ORDER — OMEGA-3-ACID ETHYL ESTERS 1 G/1
2 CAPSULE, LIQUID FILLED ORAL 2 TIMES DAILY
Status: DISCONTINUED | OUTPATIENT
Start: 2025-04-20 | End: 2025-04-23 | Stop reason: HOSPADM

## 2025-04-20 RX ORDER — LIDOCAINE HYDROCHLORIDE ANHYDROUS AND DEXTROSE MONOHYDRATE .8; 5 G/100ML; G/100ML
1 INJECTION, SOLUTION INTRAVENOUS CONTINUOUS
Status: DISCONTINUED | OUTPATIENT
Start: 2025-04-20 | End: 2025-04-22

## 2025-04-20 RX ORDER — CEFADROXIL 500 MG/1
500 CAPSULE ORAL EVERY 12 HOURS
Status: DISCONTINUED | OUTPATIENT
Start: 2025-04-20 | End: 2025-04-23 | Stop reason: HOSPADM

## 2025-04-20 RX ADMIN — Medication 400 MG: at 12:04

## 2025-04-20 RX ADMIN — MUPIROCIN: 20 OINTMENT TOPICAL at 08:04

## 2025-04-20 RX ADMIN — AMLODIPINE BESYLATE 10 MG: 10 TABLET ORAL at 12:04

## 2025-04-20 RX ADMIN — LORAZEPAM 1 MG: 2 INJECTION INTRAMUSCULAR; INTRAVENOUS at 08:04

## 2025-04-20 RX ADMIN — POTASSIUM CHLORIDE 30 MEQ: 750 CAPSULE, EXTENDED RELEASE ORAL at 12:04

## 2025-04-20 RX ADMIN — POTASSIUM BICARBONATE 40 MEQ: 391 TABLET, EFFERVESCENT ORAL at 08:04

## 2025-04-20 RX ADMIN — WARFARIN SODIUM 4 MG: 3 TABLET ORAL at 04:04

## 2025-04-20 RX ADMIN — METOPROLOL SUCCINATE 12.5 MG: 25 TABLET, EXTENDED RELEASE ORAL at 08:04

## 2025-04-20 RX ADMIN — METOROPROLOL TARTRATE 5 MG: 5 INJECTION, SOLUTION INTRAVENOUS at 09:04

## 2025-04-20 RX ADMIN — OMEGA-3-ACID ETHYL ESTERS 2 G: 1 CAPSULE, LIQUID FILLED ORAL at 08:04

## 2025-04-20 RX ADMIN — MIRTAZAPINE 45 MG: 15 TABLET, FILM COATED ORAL at 08:04

## 2025-04-20 RX ADMIN — PANTOPRAZOLE SODIUM 40 MG: 40 TABLET, DELAYED RELEASE ORAL at 01:04

## 2025-04-20 RX ADMIN — LORAZEPAM 1 MG: 2 INJECTION INTRAMUSCULAR; INTRAVENOUS at 09:04

## 2025-04-20 RX ADMIN — FUROSEMIDE 40 MG: 10 INJECTION, SOLUTION INTRAVENOUS at 09:04

## 2025-04-20 RX ADMIN — MUPIROCIN: 20 OINTMENT TOPICAL at 12:04

## 2025-04-20 RX ADMIN — MAGNESIUM SULFATE HEPTAHYDRATE 2 G: 40 INJECTION, SOLUTION INTRAVENOUS at 09:04

## 2025-04-20 RX ADMIN — POTASSIUM CHLORIDE 10 MEQ: 7.46 INJECTION, SOLUTION INTRAVENOUS at 09:04

## 2025-04-20 RX ADMIN — AMIODARONE HYDROCHLORIDE 1 MG/MIN: 1.8 INJECTION, SOLUTION INTRAVENOUS at 08:04

## 2025-04-20 RX ADMIN — AMIODARONE HYDROCHLORIDE 150 MG: 1.5 INJECTION, SOLUTION INTRAVENOUS at 08:04

## 2025-04-20 RX ADMIN — CEFADROXIL 500 MG: 500 CAPSULE ORAL at 08:04

## 2025-04-20 NOTE — ASSESSMENT & PLAN NOTE
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  His latest TSH is slightly above ULN, which is very reassuring that he is not being overtreated for AIH. T4 free is wnl.  He has no significant thyroid-related symptoms and takes LT4 appropriately every day.  The abnormal discharges from ICD were not related to his thyroid function as he is if anything a little hypothyroid - though with his risk profile and normal FT4 we are satisfied with current lab results.    -Continue levothyroxine 125 mcg daily  -Recheck TSH and FT4 in 3-4 weeks   -OK to continue amio from our standpoint

## 2025-04-20 NOTE — H&P
[facility]  Heart Transplant  H&P    Patient Name: Tim Richards  MRN: [id number]  Admission Date: 4/20/2025  Attending Physician: Amy Rhodes MD  Primary Care Provider: Diego Daniel MD  Principal Problem:Ventricular tachycardia    Subjective:     History of Present Illness:  58 yo black male with stage D HFrEF s/p HM2 (implanted 3/8/2018 as DT-VAD) but required pump exchange (HM3 pump exchange 7/13/2022) who presents to the ED after getting shocked 17 times by his device. In the ED he was in VT and was started on amiodarone. When he was receiving IV metoprolol his VT rate went from 130 to 180 to 240 to VF and then he was shocked. He was awake and otherwise felt fine. He is not sure what may have caused this; he has been compliant with medications. He was recently switched from doxycycline to cefadroxil and 2 weeks ago was taken off of xanax.    He has a history of VT and is currently on amiodarone and mexiletine and metoprolol. He also has a history of amiodarone induced thyrotoxicosis. TSH today 4.7. He has a history of alcohol abuse but reports he has been sober for a few months. iSTAT labs showed K 3.5. He is volume overloaded on exam.    Past Medical History:   Diagnosis Date    A-fib     Anticoagulant long-term use     Atrial flutter 7/30/2022    CHF (congestive heart failure)     Class 1 obesity due to excess calories with serious comorbidity and body mass index (BMI) of 31.0 to 31.9 in adult     Class 1 obesity due to excess calories with serious comorbidity and body mass index (BMI) of 32.0 to 32.9 in adult     Dilated cardiomyopathy 1/10/2018    Disorder of kidney and ureter     CKD    Encounter for blood transfusion     Essential hypertension 8/28/2022    Gout     HTN (hypertension)     Hx of psychiatric care     ICD (implantable cardioverter-defibrillator) infection 7/1/2020    Psychiatric problem     Thyroid disease     Ventricular tachycardia (paroxysmal)        Past  Surgical History:   Procedure Laterality Date    AORTIC VALVULOPLASTY N/A 07/13/2022    Procedure: REPAIR, AORTIC VALVE;  Surgeon: Yg Kaufman MD;  Location: [facility] OR 2ND FLR;  Service: Cardiovascular;  Laterality: N/A;    APPLICATION OF WOUND VACUUM-ASSISTED CLOSURE DEVICE N/A 07/15/2022    Procedure: APPLICATION, WOUND VAC;  Surgeon: Yg Kaufman MD;  Location: [facility] OR 2ND FLR;  Service: Cardiovascular;  Laterality: N/A;  50 x 5 cm     APPLICATION OF WOUND VACUUM-ASSISTED CLOSURE DEVICE Right 09/27/2022    Procedure: APPLICATION, WOUND VAC;  Surgeon: Kole Tabares MD;  Location: [facility] OR 2ND FLR;  Service: Plastics;  Laterality: Right;    CARDIAC CATHETERIZATION  12/2012    CARDIAC DEFIBRILLATOR PLACEMENT Left     CRRT-D    CARDIAC VALVE REPLACEMENT  03/08/2018    LVAD Implant    COLONOSCOPY N/A 03/06/2018    Procedure: COLONOSCOPY;  Surgeon: Alonso Bone MD;  Location: [facility] ENDO (2ND FLR);  Service: Endoscopy;  Laterality: N/A;    COLONOSCOPY N/A 07/17/2019    Procedure: COLONOSCOPY;  Surgeon: Blane Valdez MD;  Location: [facility] ENDO (2ND FLR);  Service: Endoscopy;  Laterality: N/A;    COLONOSCOPY N/A 07/18/2019    Procedure: COLONOSCOPY;  Surgeon: Blane Valdez MD;  Location: [facility] ENDO (2ND FLR);  Service: Endoscopy;  Laterality: N/A;    CREATION OF ILIOFEMORAL ARTERY BYPASS Right 09/27/2022    Procedure: CREATION, BYPASS, ARTERIAL, ILIAC TO FEMORAL WITH GRAFT;  Surgeon: Zach Hernández MD;  [facility]  Service: Peripheral Vascular;  Laterality: Right;    CREATION OF MUSCLE ROTATIONAL FLAP Right 09/27/2022    Procedure: CREATION, FLAP, MUSCLE ROTATION, SARTORIUS AND RECTUS FEMORIS;  Surgeon: Kole Tabares MD;  Location: [facility] OR 2ND FLR;  Service: Plastics;  Laterality: Right;    ESOPHAGOGASTRODUODENOSCOPY N/A 07/17/2019    Procedure: EGD (ESOPHAGOGASTRODUODENOSCOPY);  Surgeon: Blane Valdez MD;  Location: [facility] ENDO (2ND FLR);  Service: Endoscopy;  Laterality: N/A;     ESOPHAGOGASTRODUODENOSCOPY N/A 07/18/2019    Procedure: EGD (ESOPHAGOGASTRODUODENOSCOPY);  Surgeon: Blane Valdez MD;  Location: [facility] ENDO (2ND FLR);  Service: Endoscopy;  Laterality: N/A;    FOREIGN BODY REMOVAL N/A 07/22/2022    Procedure: REMOVAL, FOREIGN BODY;  Surgeon: Yg Kaufman MD;  Location: [facility] OR [facility] FLR;  Service: Cardiovascular;  Laterality: N/A;  LVAD Heartmate 2 drive line removal    INSERTION OF GRAFT TO PERICARDIUM N/A 07/15/2022    Procedure: INSERTION, GRAFT, PERICARDIUM;  Surgeon: Yg Kaufman MD;  Location: [facility] OR [facility] FLR;  Service: Cardiovascular;  Laterality: N/A;    IRRIGATION OF MEDIASTINUM N/A 07/15/2022    Procedure: IRRIGATION, MEDIASTINUM;  Surgeon: Yg Kaufman MD;  Location: [facility] OR [facility] FLR;  Service: Cardiovascular;  Laterality: N/A;    LYSIS OF ADHESIONS  07/13/2022    Procedure: LYSIS, ADHESIONS;  Surgeon: Yg Kaufman MD;  [facility];  Service: Cardiovascular;;    NONINVASIVE CARDIAC ELECTROPHYSIOLOGY STUDY N/A 10/18/2019    Procedure: CARDIAC ELECTROPHYSIOLOGY STUDY, NONINVASIVE;  Surgeon: Raz Wagner MD;  Location: [facility] EP LAB;  Service: Cardiology;  Laterality: N/A;  VT, DFTs, MDT CRTD in situ, LVAD, juarez, MB, 3098    REPLACEMENT OF IMPLANTABLE CARDIOVERTER-DEFIBRILLATOR (ICD) GENERATOR N/A 03/09/2020    Procedure: REPLACEMENT, ICD GENERATOR;  Surgeon: Harry Yun MD;  Location: [facility] EP LAB;  Service: Cardiology;  Laterality: N/A;  VT, ICD Gen Change and Lead Revision, MDT, MAC, DM,3 Prep    REPLACEMENT OF LEFT VENTRICULAR ASSIST DEVICE (LVAD)  07/13/2022    Procedure: REPLACEMENT, LVAD;  Surgeon: Yg Kaufman MD;  [facility];  Service: Cardiovascular;;    REPLACEMENT OF PUMP N/A 07/13/2022    Procedure: REPLACEMENT, PUMP;  Surgeon: Yg Kaufman MD;  [facility];  Service: Cardiovascular;  Laterality: N/A;  LVAD pump exchange  EXPLANATION OF HEATMATE 2  IMPLANTATION OF HEARTMATE 3  IMPLANTATION OF 8MM CHIMNEY  GRAFT TO RFA  INITIATION OF ECMO  TEMPORARY CLOSURE OF CHEST    REVISION OF IMPLANTABLE CARDIOVERTER-DEFIBRILLATOR (ICD) ELECTRODE LEAD PLACEMENT N/A 03/09/2020    Procedure: REVISION, INSERTION, ELECTRODE LEAD, ICD;  Surgeon: Harry Yun MD;  Location: [facility] EP LAB;  Service: Cardiology;  Laterality: N/A;  VT, ICD Gen Change and Lead Revision, MDT, MAC, DM,3 Prep    RIGHT HEART CATHETERIZATION Right 09/08/2023    Procedure: INSERTION, CATHETER, RIGHT HEART;  Surgeon: Gaurav Rodriguez MD;  Location: [facility] CATH LAB;  Service: Cardiology;  Laterality: Right;    STERNAL WOUND CLOSURE N/A 07/14/2022    Procedure: CLOSURE, WOUND, STERNUM;  Surgeon: Yg Kaufman MD;  Location: [facility] OR [facility] FLR;  Service: Cardiovascular;  Laterality: N/A;  temporary closure  evacuation of hematoma    STERNAL WOUND CLOSURE N/A 07/15/2022    Procedure: CLOSURE, WOUND, STERNUM;  Surgeon: Yg Kaufman MD;  Location: [facility] OR [facility] FLR;  Service: Cardiovascular;  Laterality: N/A;    TRACHEOSTOMY N/A 08/04/2022    Procedure: CREATION, TRACHEOSTOMY;  Surgeon: Germain Holt MD;  Location: [facility] OR [facility] FLR;  Service: General;  Laterality: N/A;    TREATMENT OF CARDIAC ARRHYTHMIA  10/18/2019    Procedure: Cardioversion or Defibrillation;  Surgeon: Raz Wagner MD;  Location: [facility] EP LAB;  Service: Cardiology;;       Review of patient's allergies indicates:   Allergen Reactions    Lisinopril Anaphylaxis    Hydralazine     Hydralazine analogues      Chronic constipation, impotence, dizziness       Current Facility-Administered Medications   Medication    amiodarone 360 mg/200 mL (1.8 mg/mL) infusion    amLODIPine tablet 10 mg    cefadroxil capsule 500 mg    [START ON 4/21/2025] levothyroxine tablet 125 mcg    LIDOcaine (cardiac) injection 100 mg    LIDOcaine 2000 mg in D5W 250 mL infusion    magnesium oxide tablet 400 mg    metoprolol succinate (TOPROL-XL) 24 hr split tablet 12.5 mg    mirtazapine tablet 45 mg    mupirocin 2 % ointment     omega-3 acid ethyl esters capsule 2 g    pantoprazole EC tablet 40 mg    warfarin tablet 4 mg     Family History       Problem Relation (Age of Onset)    Cancer Sister (54)    Coronary artery disease Father    Diabetes Father    Heart disease Father    Hypertension Father    No Known Problems Mother, Brother          Tobacco Use    Smoking status: Former     Current packs/day: 0.00     Types: Vaping w/o nicotine, Cigarettes     Start date: 2018     Quit date: 2018     Years since quittin.2     Passive exposure: Never    Smokeless tobacco: Never    Tobacco comments:     pt is quiting on his own - pt stated not qualified for program;  pt  quit on his own   Substance and Sexual Activity    Alcohol use: Never     Comment: quit    Drug use: Never    Sexual activity: Not Currently     Partners: Female     Birth control/protection: None     Comment: 10/5/17  with same partner 7 years      Review of Systems   Constitutional:  Negative for chills and fever.   HENT:  Negative for hearing loss.    Eyes:  Negative for visual disturbance.   Respiratory:  Negative for cough and shortness of breath.    Cardiovascular:  Negative for chest pain and palpitations.   Gastrointestinal:  Negative for abdominal distention, abdominal pain, diarrhea and vomiting.   Genitourinary:  Negative for dysuria.   Musculoskeletal:  Negative for arthralgias.   Neurological:  Negative for dizziness.   Psychiatric/Behavioral:  Negative for confusion.      Objective:     Vital Signs (Most Recent):  Temp: 98.1 °F (36.7 °C) (25 1153)  Pulse: 110 (25 1300)  Resp: 16 (25 1300)  BP: 90/70 (25 1300)  SpO2: 96 % (25 1300) Vital Signs (24h Range):  Temp:  [98.1 °F (36.7 °C)-98.6 °F (37 °C)] 98.1 °F (36.7 °C)  Pulse:  [] 110  Resp:  [15-24] 16  SpO2:  [95 %-98 %] 96 %  BP: (68-93)/(0-70) 90/70     Patient Vitals for the past 72 hrs (Last 3 readings):   Weight   25 1109 109.5 kg (241 lb 6.5 oz)  "  04/20/25 0800 104.3 kg (230 lb)     Body mass index is 31.85 kg/m².      Intake/Output Summary (Last 24 hours) at 4/20/2025 1658  Last data filed at 4/20/2025 1500  Gross per 24 hour   Intake 238 ml   Output 1350 ml   Net -1112 ml          Physical Exam  Constitutional:       Appearance: Normal appearance.   HENT:      Head: Normocephalic and atraumatic.      Nose: Nose normal.      Mouth/Throat:      Mouth: Mucous membranes are moist.   Eyes:      Pupils: Pupils are equal, round, and reactive to light.   Cardiovascular:      Rate and Rhythm: Normal rate and regular rhythm.      Pulses: Normal pulses.   Pulmonary:      Effort: No respiratory distress.   Abdominal:      General: There is no distension.      Palpations: Abdomen is soft.      Tenderness: There is no abdominal tenderness.   Musculoskeletal:         General: No signs of injury.   Skin:     General: Skin is warm and dry.   Neurological:      General: No focal deficit present.      Mental Status: He is alert and oriented to person, place, and time.   Psychiatric:         Mood and Affect: Mood normal.         Behavior: Behavior normal.            Significant Labs:  CBC:  Recent Labs   Lab 04/20/25  0827 04/20/25  0833 04/20/25  0941   WBC 6.72  --  3.47*   RBC 4.59*  --  4.08*   HGB 13.2*  --  11.4*   HCT 41.7 43 36.5*     --  188   MCV 91  --  90   MCH 28.8  --  27.9   MCHC 31.7*  --  31.2*     BNP:  Recent Labs   Lab 04/20/25  0827   *     CMP:  Recent Labs   Lab 04/20/25  0827   CALCIUM 8.9   ALBUMIN 3.9      K 3.5   CO2 22*      BUN 24*   CREATININE 2.3*   ALKPHOS 113   ALT 19   AST 38   BILITOT 0.3      Coagulation:   Recent Labs   Lab 04/14/25  1513 04/20/25  0941   INR 2.5* 2.7*   APTT  --  56.4*     LDH:  No results for input(s): "LDH" in the last 72 hours.  Microbiology:  Microbiology Results (last 7 days)       ** No results found for the last 168 hours. **            I have reviewed all pertinent labs within the past " 24 hours.          Assessment/Plan:     * Ventricular tachycardia  - Formal device interrogation   - Bolus of amiodarone   - Increase beta blocker   - Keep K > 4 and Mg > 2  - Telemetry     amiodarone induced hypothyrodism  Endocrinology consulted appreciate recs. Given TSH 4.7 he is essentially euthyroid and unlikely to contribute to arrhythmia  -Continue levothyroxine 125 mcg daily  -Recheck TSH and FT4 in 3-4 weeks   -OK to continue amio from our standpoint    LVAD (left ventricular assist device) present  Procedure: Device Interrogation Including analysis of device parameters  Current Settings: Ventricular Assist Device  Review of device function is stable/unstable stable        4/20/2025     3:00 PM 4/20/2025     2:00 PM 4/20/2025     1:00 PM 4/20/2025    12:00 PM 4/20/2025    11:47 AM 4/20/2025    11:34 AM 4/20/2025     8:14 AM   TXP LVAD INTERROGATIONS   Type    HeartMate3 HeartMate3 HeartMate3 HeartMate3   Flow 5.8 5.7 5.8 5.8 5.7 5.8 6300   Speed    6300 6300 6300 5.5   PI 1.8 2.4 1.8 2 2.5 1.9 2.1   Power ([place]) 5.5 5.6 5.5 5.5 5.5 5.5 5.6   LSL    5900      Pulsatility    Intermittent pulse   No Pulse           Dayton Gomez MD  Heart Transplant  [facility]

## 2025-04-20 NOTE — HPI
This is a 60 yo man with PMHx Afib/flutter, CHF, dilated DM, ICD placed. He presented to the hospital after ICD discharges x5 at home. He was seen to have VT at the hospital and Endocrinology was consulted d/t concern for amiodarone-induced hyperthyroidism. He is on amiodarone 400 mg PO at home.  His ICD was turned off and he was admitted to CICU.    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 He required a prolonged course of PDN and MMI, then subsequently developed hypothyroidism in 5/2020. He was then hospitalized in the summer of 2022 for a prolonged period of time and TFTs were c/w hyperthyroidism again and he was placed back on steroids and MMI for AIT. Fast resolution of hyperthyroidism with quick taper of steroids and MMI. He was ultimately restarted on low dose levothyroxine at time of hospital discharge at the end of august 2022 with up-titration outpatient based on TFTs.  Most recently seen at our clinic by Delaney HOLT March 2025. His TSH was seen to be elevated and he was instructed to increase LT4 dosing.     He is still on Amiodarine 400 mg daily.     Currently taking:  Generic Levothyroxine 125 mcg daily.   An increase his dose was discussed after last set of labs but he never changed the way he takes his LT4.     He took it appropriately on an empty stomach and waits at least 30 minutes prior to eating.  He does take iron supplements, which he usually takes in the afternoons and at least 4 hours away from levothyroxine.

## 2025-04-20 NOTE — PROGRESS NOTES
Patient ID: Tim Richards is a 59 y.o. male.    Chief Complaint: General Illness (Entered automatically based on patient selection in [employer].)    The patient initiated a request through [employer] on 4/20/2025 for evaluation and management with a chief complaint of General Illness (Entered automatically based on patient selection in [employer].)     I evaluated the questionnaire submission on 04/20/2025  .    [facility] E[facility]-Chronic Conditions    4/20/2025  3:32 AM CDT - Filed by Patient   What do you need help with? Other Concern   Do you agree to participate in an E-Visit? Yes   If you have any of the following symptoms, please go to the nearest emergency room or call 911: I acknowledge   What is the main issue you would like addressed today? Hyporactigity   Please describe your symptoms. Heart racing and being flushed   Where is your problem located? Home   On a scale of 1-10, where 10 is the worst you can imagine, how severe are your symptoms? (range: 1 - 10) 9   Have you had these symptoms before? Yes   How long have you had these symptoms? More than a month   What helps with your symptoms? Xanex   What makes your symptoms feel worse? Not sure   Are your symptoms related to a condition you currently have? Yes   What condition do you currently have? Congestive heart failure.   When were you last seen for this condition? 3/21/2025   Provide any additional information you feel is important.    Please attach any relevant images or files    Are you able to take your vital signs? No         Encounter Diagnosis   Name Primary?    V-tach Yes        No orders of the defined types were placed in this encounter.           No follow-ups on file.    To the [facility] for [facility]  E-Visit Time Tracking:    Day 1 Time (in minutes): 5    Total Time (in minutes): 5

## 2025-04-20 NOTE — EICU
"Virtual ICU Admission    Admit Date: 2025  LOS: 0  Code Status: Full Code   : 1965  Bed: [id number] A:     Diagnosis: <principal problem not specified>    Patient  has a past medical history of A-fib, Anticoagulant long-term use, Atrial flutter, CHF (congestive heart failure), Class 1 obesity due to excess calories with serious comorbidity and body mass index (BMI) of 31.0 to 31.9 in adult, Class 1 obesity due to excess calories with serious comorbidity and body mass index (BMI) of 32.0 to 32.9 in adult, Dilated cardiomyopathy, Disorder of kidney and ureter, Encounter for blood transfusion, Essential hypertension, Gout, HTN (hypertension), psychiatric care, ICD (implantable cardioverter-defibrillator) infection, Psychiatric problem, Thyroid disease, and Ventricular tachycardia (paroxysmal).    Last VS: BP (!) 82/0 (BP Location: Left arm)   Pulse 64   Temp 98.6 °F (37 °C) (Oral)   Resp 15   Ht 6' 1" (1.854 m)   Wt 109.5 kg (241 lb 6.5 oz)   SpO2 98%   BMI 31.85 kg/m²       VICU Review                   Virtual ICU Admission    Admit Date: 2025  LOS: 0  Code Status: Full Code   : 1965  Bed: [id number] A:     Diagnosis: <principal problem not specified>    Patient  has a past medical history of A-fib, Anticoagulant long-term use, Atrial flutter, CHF (congestive heart failure), Class 1 obesity due to excess calories with serious comorbidity and body mass index (BMI) of 31.0 to 31.9 in adult, Class 1 obesity due to excess calories with serious comorbidity and body mass index (BMI) of 32.0 to 32.9 in adult, Dilated cardiomyopathy, Disorder of kidney and ureter, Encounter for blood transfusion, Essential hypertension, Gout, HTN (hypertension), psychiatric care, ICD (implantable cardioverter-defibrillator) infection, Psychiatric problem, Thyroid disease, and Ventricular tachycardia (paroxysmal).    Last VS: BP (!) 82/0 (BP Location: Left arm)   Pulse 64   Temp 98.6 °F (37 " "°C) (Oral)   Resp 15   Ht 6' 1" (1.854 m)   Wt 109.5 kg (241 lb 6.5 oz)   SpO2 98%   BMI 31.85 kg/m²       VICU Review    VICU nurse assessment : LDA documentation reconciliation completed        Nursing orders placed : IP JOSE Peripheral IV Access         "

## 2025-04-20 NOTE — ASSESSMENT & PLAN NOTE
59 year-old-man with stage D HFrEF s/p ICD (2014) on HM3 pump.. He also has recurrent VT as well as atrial flutter with RVR and is on chronic amiodarone. He was previously on Mexiletine but due to insurance issues, was stopped on 3/26/2025 and he was placed on Metoprolol low dose 25 mg. He is now presenting with VT and multiple shocks form his SICD. K on the lower side upon admission at 3.5.     Recommendations:   - Formal device interrogation   - Bolus of amiodarone   - Increase beta blocker   - Keep K > 4 and Mg > 2  - Telemetry

## 2025-04-20 NOTE — CONSULTS
[facility]  Cardiology  Consult Note    Patient Name: Tim Richards  MRN: [id number]  Admission Date: 4/20/2025  Hospital Length of Stay: 0 days  Code Status: Full Code   Attending Provider: No att. providers found   Consulting Provider: Laith Carter MD  Primary Care Physician: Diego Daniel MD  Principal Problem:<principal problem not specified>    Patient information was obtained from patient, past medical records, and ER records.     Inpatient consult to Electrophysiology  Consult performed by: Laith Mcgrath MD  Consult ordered by: Dayton Gomez MD      Subjective:     Chief Complaint:  SICD shocks      HPI:   59 year-old-man with stage D HFrEF s/p ICD (2014) and who was previously supported with a HM2 (implanted 3/8/2018 as DT-VAD) but required pump exchange (HM3 pump exchange 7/13/2022) for thrombosis of pump post COVID-19 PNA and AHRF. Post-op course following pump exchange was complicated by worsening cardiogenic shock/RV failure requiring VA-ECMO. He also had recurrent VT as well as atrial flutter with RVR and is on chronic amiodarone and mexiletine. In June/July 2022 he was tapered of steroids (on for amiodarone hyperthyroid) due to concern for avascular necrosis of hip.    Patient is presenting today to the ED complaining of multiple SICD fires (5 times this AM). He states that his SICD started firings since last Thursday and have been increasing. Hence he came to look for medical care. During firing events patient was not exerting himself and he did not have any symptoms priorto  pacemaker shocks         EP background history:  Infected TV-ICD 2020.  SICD (BSCI) implanted 9/14/2020. Had shocks for VT 12/2021. Mexiletine added (later increased). Last time seen on 4/17/2024 by Dr. Yun.  Mexilitin recently stopped on 3/26/2025 due to insurance not covering it. Patient started on Metoprolol XL 12.5 mg BID (advised by Dr. Fay)    Currently on Amiodarone  400 mg daily and BB as above.        Past Medical History:   Diagnosis Date    A-fib     Anticoagulant long-term use     Atrial flutter 7/30/2022    CHF (congestive heart failure)     Class 1 obesity due to excess calories with serious comorbidity and body mass index (BMI) of 31.0 to 31.9 in adult     Class 1 obesity due to excess calories with serious comorbidity and body mass index (BMI) of 32.0 to 32.9 in adult     Dilated cardiomyopathy 1/10/2018    Disorder of kidney and ureter     CKD    Encounter for blood transfusion     Essential hypertension 8/28/2022    Gout     HTN (hypertension)     Hx of psychiatric care     ICD (implantable cardioverter-defibrillator) infection 7/1/2020    Psychiatric problem     Thyroid disease     Ventricular tachycardia (paroxysmal)        Past Surgical History:   Procedure Laterality Date    AORTIC VALVULOPLASTY N/A 07/13/2022    Procedure: REPAIR, AORTIC VALVE;  Surgeon: Yg Kaufman MD;  Location: [facility] OR [address] [place] [address];  Service: Cardiovascular;  Laterality: N/A;    APPLICATION OF WOUND VACUUM-ASSISTED CLOSURE DEVICE N/A 07/15/2022    Procedure: APPLICATION, WOUND VAC;  Surgeon: Yg Kaufman MD;  Location: [facility] OR [address] [place] [address];  Service: Cardiovascular;  Laterality: N/A;  50 x 5 cm     APPLICATION OF WOUND VACUUM-ASSISTED CLOSURE DEVICE Right 09/27/2022    Procedure: APPLICATION, WOUND VAC;  Surgeon: Kole Tabares MD;  Location: [facility] OR [address] [place] [address];  Service: Plastics;  Laterality: Right;    CARDIAC CATHETERIZATION  12/2012    CARDIAC DEFIBRILLATOR PLACEMENT Left     CRRT-D    CARDIAC VALVE REPLACEMENT  03/08/2018    LVAD Implant    COLONOSCOPY N/A 03/06/2018    Procedure: COLONOSCOPY;  Surgeon: Alonso Bone MD;  Location: [address]);  Service: Endoscopy;  Laterality: N/A;    COLONOSCOPY N/A 07/17/2019    Procedure: COLONOSCOPY;  Surgeon: Blane Valdez MD;  Location: [facility] ENDO ([address] [place] [address]);  Service: Endoscopy;  Laterality: N/A;    COLONOSCOPY N/A  07/18/2019    Procedure: COLONOSCOPY;  Surgeon: Blane Valdez MD;  Location: [facility] ENDO (2ND FLR);  Service: Endoscopy;  Laterality: N/A;    CREATION OF ILIOFEMORAL ARTERY BYPASS Right 09/27/2022    Procedure: CREATION, BYPASS, ARTERIAL, ILIAC TO FEMORAL WITH GRAFT;  Surgeon: Zach Hernández MD;  [facility];  Service: Peripheral Vascular;  Laterality: Right;    CREATION OF MUSCLE ROTATIONAL FLAP Right 09/27/2022    Procedure: CREATION, FLAP, MUSCLE ROTATION, SARTORIUS AND RECTUS FEMORIS;  Surgeon: Kole Tabares MD;  [facility];  Service: Plastics;  Laterality: Right;    ESOPHAGOGASTRODUODENOSCOPY N/A 07/17/2019    Procedure: EGD (ESOPHAGOGASTRODUODENOSCOPY);  Surgeon: Blane Valdez MD;  Location: [facility] ENDO (2ND FLR);  Service: Endoscopy;  Laterality: N/A;    ESOPHAGOGASTRODUODENOSCOPY N/A 07/18/2019    Procedure: EGD (ESOPHAGOGASTRODUODENOSCOPY);  Surgeon: Blane Valdez MD;  Location: [facility] ENDO (2ND FLR);  Service: Endoscopy;  Laterality: N/A;    FOREIGN BODY REMOVAL N/A 07/22/2022    Procedure: REMOVAL, FOREIGN BODY;  Surgeon: Yg Kaufman MD;  [facility];  Service: Cardiovascular;  Laterality: N/A;  LVAD Heartmate 2 drive line removal    INSERTION OF GRAFT TO PERICARDIUM N/A 07/15/2022    Procedure: INSERTION, GRAFT, PERICARDIUM;  Surgeon: Yg Kaufman MD;  [facility];  Service: Cardiovascular;  Laterality: N/A;    IRRIGATION OF MEDIASTINUM N/A 07/15/2022    Procedure: IRRIGATION, MEDIASTINUM;  Surgeon: Yg Kaufman MD;  [facility];  Service: Cardiovascular;  Laterality: N/A;    LYSIS OF ADHESIONS  07/13/2022    Procedure: LYSIS, ADHESIONS;  Surgeon: Yg Kaufman MD;  [facility];  Service: Cardiovascular;;    NONINVASIVE CARDIAC ELECTROPHYSIOLOGY STUDY N/A 10/18/2019    Procedure: CARDIAC ELECTROPHYSIOLOGY STUDY, NONINVASIVE;  Surgeon: Raz Wagner MD;  Location: [facility] EP LAB;  Service: Cardiology;   Laterality: N/A;  VT, DFTs, MDT CRTD in situ, LVAD, juarez, MB, 3098    REPLACEMENT OF IMPLANTABLE CARDIOVERTER-DEFIBRILLATOR (ICD) GENERATOR N/A 03/09/2020    Procedure: REPLACEMENT, ICD GENERATOR;  Surgeon: Harry Yun MD;  Location: [facility] EP LAB;  Service: Cardiology;  Laterality: N/A;  VT, ICD Gen Change and Lead Revision, MDT, MAC, DM,3 Prep    REPLACEMENT OF LEFT VENTRICULAR ASSIST DEVICE (LVAD)  07/13/2022    Procedure: REPLACEMENT, LVAD;  Surgeon: Yg Kaufman MD;  [facility];  Service: Cardiovascular;;    REPLACEMENT OF PUMP N/A 07/13/2022    Procedure: REPLACEMENT, PUMP;  Surgeon: Yg Kaufman MD;  [address]  Service: Cardiovascular;  Laterality: N/A;  LVAD pump exchange  EXPLANATION OF HEATMATE 2  IMPLANTATION OF HEARTMATE 3  IMPLANTATION OF 8MM CHIMNEY GRAFT TO RFA  INITIATION OF ECMO  TEMPORARY CLOSURE OF CHEST    REVISION OF IMPLANTABLE CARDIOVERTER-DEFIBRILLATOR (ICD) ELECTRODE LEAD PLACEMENT N/A 03/09/2020    Procedure: REVISION, INSERTION, ELECTRODE LEAD, ICD;  Surgeon: Harry Yun MD;  Location: [facility] EP LAB;  Service: Cardiology;  Laterality: N/A;  VT, ICD Gen Change and Lead Revision, MDT, MAC, DM,3 Prep    RIGHT HEART CATHETERIZATION Right 09/08/2023    Procedure: INSERTION, CATHETER, RIGHT HEART;  Surgeon: Gaurav Rodriguez MD;  Location: [facility] CATH LAB;  Service: Cardiology;  Laterality: Right;    STERNAL WOUND CLOSURE N/A 07/14/2022    Procedure: CLOSURE, WOUND, STERNUM;  Surgeon: Yg Kaufman MD;  [facility];  Service: Cardiovascular;  Laterality: N/A;  temporary closure  evacuation of hematoma    STERNAL WOUND CLOSURE N/A 07/15/2022    Procedure: CLOSURE, WOUND, STERNUM;  Surgeon: Yg Kaufman MD;  [facility];  Service: Cardiovascular;  Laterality: N/A;    TRACHEOSTOMY N/A 08/04/2022    Procedure: CREATION, TRACHEOSTOMY;  Surgeon: Germain Holt MD;  [facility];  Service: General;   Laterality: N/A;    TREATMENT OF CARDIAC ARRHYTHMIA  10/18/2019    Procedure: Cardioversion or Defibrillation;  Surgeon: Raz Wagner MD;  Location: [facility] EP LAB;  Service: Cardiology;;       Review of patient's allergies indicates:   Allergen Reactions    Lisinopril Anaphylaxis    Hydralazine     Hydralazine analogues      Chronic constipation, impotence, dizziness       No current facility-administered medications on file prior to encounter.     Current Outpatient Medications on File Prior to Encounter   Medication Sig    amiodarone (PACERONE) 200 MG Tab Take 2 tablets (400 mg total) by mouth once daily.    amLODIPine (NORVASC) 10 MG tablet Take 1 tablet (10 mg total) by mouth once daily.    cefadroxil (DURICEF) 500 MG Cap Take 1 capsule (500 mg total) by mouth every 12 (twelve) hours.    cyanocobalamin 1,000 mcg/mL injection INJECT 1 ML (1,000 MCG TOTAL) INTO THE SKIN ONCE A WEEK. FOR 24 DOSES    doxycycline (VIBRAMYCIN) 100 MG Cap Take 1 capsule (100 mg total) by mouth 2 (two) times daily.    ferrous gluconate 324 mg (37.5 mg iron) Tab tablet TAKE 1 TABLET BY MOUTH 2 TIMES DAILY WITH MEALS.    levothyroxine (SYNTHROID) 125 MCG tablet Take 1 tablet (125 mcg total) by mouth before breakfast.    magnesium oxide (MAG-OX) 400 mg (241.3 mg magnesium) tablet Take 1 tablet (400 mg total) by mouth once daily.    metoprolol succinate (TOPROL-XL) 25 MG 24 hr tablet Take 0.5 tablets (12.5 mg total) by mouth 2 (two) times daily.    mirtazapine (REMERON) 45 MG tablet Take 1 tablet (45 mg total) by mouth every evening.    omega-3 acid ethyl esters (LOVAZA) 1 gram capsule Take 2 capsules (2 g total) by mouth 2 (two) times daily.    pantoprazole (PROTONIX) 40 MG tablet Take 1 tablet (40 mg total) by mouth daily with lunch.    potassium chloride SA (K-DUR,KLOR-CON) 20 MEQ tablet TAKE 1 TABLET WITH EVERY LASIX 40 MG DOSE    torsemide (DEMADEX) 20 MG Tab Take 1 tablets (20mg) on Mondays.    warfarin  (COUMADIN) 5 MG tablet Take 1-1.5 tablets (5-7.5 mg total) by mouth Daily. As directed by the [facility]    zolpidem (AMBIEN CR) 12.5 MG CR tablet TAKE 1 TABLET (12.5 MG TOTAL) BY MOUTH NIGHTLY AS NEEDED FOR INSOMNIA.    [DISCONTINUED] sildenafiL (VIAGRA) 100 MG tablet Take 1 tablet (100 mg total) by mouth daily as needed for Erectile Dysfunction.     Family History       Problem Relation (Age of Onset)    Cancer Sister (54)    Coronary artery disease Father    Diabetes Father    Heart disease Father    Hypertension Father    No Known Problems Mother, Brother          Tobacco Use    Smoking status: Former     Current packs/day: 0.00     Types: Vaping w/o nicotine, Cigarettes     Start date: 2018     Quit date: 2018     Years since quittin.2     Passive exposure: Never    Smokeless tobacco: Never    Tobacco comments:     pt is quiting on his own - pt stated not qualified for program;  pt  quit on his own   Substance and Sexual Activity    Alcohol use: Never     Comment: quit    Drug use: Never    Sexual activity: Not Currently     Partners: Female     Birth control/protection: None     Comment: 10/5/17  with same partner 7 years      Review of Systems   Constitutional: Negative.   HENT: Negative.     Eyes: Negative.    Cardiovascular: Negative.    Respiratory: Negative.     Endocrine: Negative.    Skin: Negative.    Musculoskeletal: Negative.    Gastrointestinal: Negative.    Genitourinary: Negative.    Neurological: Negative.    Psychiatric/Behavioral: Negative.       Objective:     Vital Signs (Most Recent):  Temp: 98.1 °F (36.7 °C) (25 1153)  Pulse: 65 (25 1153)  Resp: 16 (25 1153)  BP: (!) 78/0 (25 1109)  SpO2: 96 % (25 1153) Vital Signs (24h Range):  Temp:  [98.1 °F (36.7 °C)-98.6 °F (37 °C)] 98.1 °F (36.7 °C)  Pulse:  [] 65  Resp:  [15-24] 16  SpO2:  [95 %-98 %] 96 %  BP: (78-86)/(0) 78/0     Weight: 109.5 kg (241 lb 6.5 oz)  Body mass  index is 31.85 kg/m².    SpO2: 96 %         Intake/Output Summary (Last 24 hours) at 4/20/2025 1156  Last data filed at 4/20/2025 1131  Gross per 24 hour   Intake 118 ml   Output 400 ml   Net -282 ml       Lines/Drains/Airways       Line  Duration                  VAD 07/13/22 1300 Left ventricular assist device HeartMate 3 1011 days              Peripheral Intravenous Line  Duration                  Peripheral IV - Single Lumen 04/20/25 0829 20 G Anterior;Distal;Right Upper Arm <1 day         Peripheral IV - Single Lumen 04/20/25 0910 20 G Anterior;Left Forearm <1 day                     Physical Exam  HENT:      Head: Normocephalic.      Mouth/Throat:      Mouth: Mucous membranes are moist.   Eyes:      Pupils: Pupils are equal, round, and reactive to light.   Cardiovascular:      Rate and Rhythm: Normal rate and regular rhythm.      Pulses:           Radial pulses are 2+ on the right side and 2+ on the left side.        Dorsalis pedis pulses are 2+ on the right side and 2+ on the left side.        Posterior tibial pulses are 2+ on the right side and 2+ on the left side.      Heart sounds: Normal heart sounds, S1 normal and S2 normal.      Comments: LVAD with hum heard  Pulmonary:      Effort: Pulmonary effort is normal.   Abdominal:      General: Abdomen is flat.   Musculoskeletal:         General: Normal range of motion.      Right lower leg: No edema.      Left lower leg: No edema.   Skin:     General: Skin is warm.      Capillary Refill: Capillary refill takes less than 2 seconds.   Neurological:      General: No focal deficit present.      Mental Status: He is alert.   Psychiatric:         Mood and Affect: Mood normal.          Significant Labs:   Recent Lab Results  (Last 5 results in the past 24 hours)        04/20/25  1036   04/20/25  1024   04/20/25  0941   04/20/25  0833   04/20/25  0827        Base Deficit   -0.3             Performed By:   Dwaine             Correct Temperature (PH)   7.369              Corrected Temperature (pCO2)   43.8             Corrected Temperature (pO2)   86.3             Specimen source   Arterial             Albumin         3.9       ALP         113       ALT         19       Anion Gap         14       PTT     56.4  Comment: Refer to local heparin nomogram for intensity/dose specific therapeutic range.           AST         38       Baso #     0.02     0.03       Basophil %     0.6     0.4       BILIRUBIN TOTAL         0.3  Comment: For infants and newborns, interpretation of results should be based   on gestational age, weight and in agreement with clinical   observations.    Premature Infant recommended reference ranges:   0-24 hours:  <8.0 mg/dL   24-48 hours: <12.0 mg/dL   3-5 days:    <15.0 mg/dL   6-29 days:   <15.0 mg/dL       BIPAP   0             BNP         189  Comment: Values of less than 100 pg/ml are consistent with non-CHF populations.        BUN         24       Calcium         8.9       Chloride         100       CO2         22       Creatinine         2.3       eGFR         32  Comment: Estimated GFR calculated using the CKD-EPI creatinine (2021) equation.       Eos #     0.04     0.08       Eos %     1.2     1.2       FiO2   21.0             Free T4         1.35       Glucose         83       Gran # (ANC)     2.38     2.68       Hematocrit     36.5     41.7       Hemoglobin     11.4     13.2       Hepatitis C Ab         Non-Reactive       HIV 1/2 Ag/Ab         Reactive  Comment: Presumptive evidence of HIV-1 p24 Ag and/or HIV-1/HIV-2 Ab. The sample will be sent for HIV1/2 supplemental testing. The results will be issued separately.        Immature Grans (Abs)     0.01  Comment: Mild elevation in immature granulocytes is non specific and can be seen in a variety of conditions including stress response, acute inflammation, trauma and pregnancy. Correlation with other laboratory and clinical findings is essential.     0.01  Comment: Mild elevation in immature  granulocytes is non specific and can be seen in a variety of conditions including stress response, acute inflammation, trauma and pregnancy. Correlation with other laboratory and clinical findings is essential.       Immature Granulocytes     0.3     0.1       INR     2.7  Comment: Coumadin Therapy:    2.0 - 3.0 for INR for all indicators except mechanical heart valves    and antiphospholipid syndromes which should use 2.5 - 3.5.           Lymph #     0.58     3.26       Lymph %     16.7     48.5       Magnesium      2.0     1.9       MCH     27.9     28.8       MCHC     31.2     31.7       MCV     90     91       Mono #     0.44     0.66       Mono %     12.7     9.8       MPV     9.7     9.9       Neut %     68.5     40.0       nRBC     0     0       Phosphorus Level     2.8     2.9       Platelet Count     188     244       POC BUN       24         POC Chloride       101         POC Creatinine       2.7         POC Glucose       87         POC HCO3   24.1             POC Hematocrit       43         POC Ionized Calcium       1.03         POC PCO2   43.8             POC PH   7.369             POC PO2   86.3             POC Potassium       3.5         POC SATURATED O2   96.5             POC Sodium       136         POC TCO2 (MEASURED)       23         POC Temp   37.0             Potassium         3.5       PROTEIN TOTAL         8.8       PT     27.9           RBC     4.08     4.59       RDW     14.8     15.0       Sample       MAEVE         Sodium         136       Troponin I High Sensitivity 53         61       TSH         4.710       WBC     3.47     6.72                              Assessment and Plan:     Ventricular tachycardia  59 year-old-man with stage D HFrEF s/p ICD (2014) on HM3 pump.. He also has recurrent VT as well as atrial flutter with RVR and is on chronic amiodarone. He was previously on Mexiletine but due to insurance issues, was stopped on 3/26/2025 and he was placed on Metoprolol low dose 25 mg.  He is now presenting with VT and multiple shocks form his SICD. K on the lower side upon admission at 3.5.     Recommendations:   - Formal device interrogation   - Bolus of amiodarone   - Increase beta blocker   - Keep K > 4 and Mg > 2  - Telemetry           VTE Risk Mitigation (From admission, onward)      None            Thank you for your consult. I will follow-up with patient. Please contact us if you have any additional questions.    Laith Carter MD  Cardiology   [facility] - Card[facility]

## 2025-04-20 NOTE — PLAN OF CARE
Patient Chart Advisories  Tim Richards - [id number]  Take notice of the following advisories for this patient before you continue.     Patient has an FYI of type Patient High Alert         Patient has Ventricular Assist Device (VAD). Please page VAD Coordinator on Call at [phone] for questions, concerns, or procedures.               DO NOT BOLUS HEPARIN               Utilize VAD admit and transfer orders               Implanted as HM2 DT VAD on 3/8/18. Exchanged to DT HM3 on 7/13/22.               If you are trying to schedule or reschedule this patient, please contact the VAD team p [facility] LVAD Clinical

## 2025-04-20 NOTE — ASSESSMENT & PLAN NOTE
Endocrinology consulted appreciate recs. Given TSH 4.7 he is essentially euthyroid and unlikely to contribute to arrhythmia  -Continue levothyroxine 125 mcg daily  -Recheck TSH and FT4 in 3-4 weeks   -OK to continue amio from our standpoint

## 2025-04-20 NOTE — PLAN OF CARE
CICU DAILY GOALS       A: Awake    RASS: Goal -    Actual -     Restraint necessity:    B: Breath   SBT: Not intubated   C: Coordinate A & B, analgesics/sedatives   Pain: managed    SAT: Not intubated  D: Delirium   CAM-ICU:    E: Early(intubated/ Progressive (non-intubated) Mobility   MOVE Screen: Pass   Activity: Activity Management: Rolling - L1  FAS: Feeding/Nutrition   Diet order: Diet/Nutrition Received: regular,   Fluid restriction:    T: Thrombus   DVT prophylaxis: VTE Core Measure: Pharmacological prophylaxis initiated/maintained  H: HOB Elevation   Head of Bed (HOB) Positioning: HOB at 30 degrees  U: Ulcer Prophylaxis   GI: yes  G: Glucose control   managed    S: Skin   Bundle compliance: yes   Bathing/Skin Care: back care, bath, complete, dressed/undressed Date: 4.20.25    B: Bowel Function   no issues   I: Indwelling Catheters   [place] necessity:     CVC necessity: No   IPAD offered: No  D: De-escalation Antibx   No  Plan for the day     Family/Goals of care/Code Status   Code Status: Full Code     No acute events throughout day, VS and assessment per flow sheet, patient progressing towards goals as tolerated, plan of care reviewed with Tim Richards and family, all concerns addressed, will continue to monitor.  LVAD no alarm. No arhythmia.

## 2025-04-20 NOTE — ED PROVIDER NOTES
Source of History:  Patient and chart    Chief complaint:  Pacemaker Problem (Fired 5 times.  Lvad pt with no alarms. denies symptoms)      HPI:  Tim Rihcards is a 59 y.o. male with a medical history as below presents to the emergency department with a chief complaint of IED discharge.  The patient has had his IED discharge 5 times since Thursday.  The patient did not notice any change in sensation prior to the discharge.  He was well feeling and not exerting himself during the discharges.  Patient does currently received doxycycline for driveline infection.  He is otherwise well and has no complaints.    Review of patient's allergies indicates:   Allergen Reactions    Lisinopril Anaphylaxis    Hydralazine     Hydralazine analogues      Chronic constipation, impotence, dizziness       Medications Ordered Prior to Encounter[1]    PMH:  As per HPI and below:  Past Medical History:   Diagnosis Date    A-fib     Anticoagulant long-term use     Atrial flutter 7/30/2022    CHF (congestive heart failure)     Class 1 obesity due to excess calories with serious comorbidity and body mass index (BMI) of 31.0 to 31.9 in adult     Class 1 obesity due to excess calories with serious comorbidity and body mass index (BMI) of 32.0 to 32.9 in adult     Dilated cardiomyopathy 1/10/2018    Disorder of kidney and ureter     CKD    Encounter for blood transfusion     Essential hypertension 8/28/2022    Gout     HTN (hypertension)     Hx of psychiatric care     ICD (implantable cardioverter-defibrillator) infection 7/1/2020    Psychiatric problem     Thyroid disease     Ventricular tachycardia (paroxysmal)      Past Surgical History:   Procedure Laterality Date    AORTIC VALVULOPLASTY N/A 07/13/2022    Procedure: REPAIR, AORTIC VALVE;  Surgeon: Yg Kaufman MD;  Location: [facility] OR [address] [place] [address];  Service: Cardiovascular;  Laterality: N/A;    APPLICATION OF WOUND VACUUM-ASSISTED CLOSURE DEVICE N/A 07/15/2022    Procedure: APPLICATION,  WOUND VAC;  Surgeon: Yg Kaufman MD;  Location: [facility] OR 2ND FLR;  Service: Cardiovascular;  Laterality: N/A;  50 x 5 cm     APPLICATION OF WOUND VACUUM-ASSISTED CLOSURE DEVICE Right 09/27/2022    Procedure: APPLICATION, WOUND VAC;  Surgeon: Kole Tabares MD;  Location: [facility] OR 2ND FLR;  Service: Plastics;  Laterality: Right;    CARDIAC CATHETERIZATION  12/2012    CARDIAC DEFIBRILLATOR PLACEMENT Left     CRRT-D    CARDIAC VALVE REPLACEMENT  03/08/2018    LVAD Implant    COLONOSCOPY N/A 03/06/2018    Procedure: COLONOSCOPY;  Surgeon: Alonso Bone MD;  Location: [facility] ENDO (2ND FLR);  Service: Endoscopy;  Laterality: N/A;    COLONOSCOPY N/A 07/17/2019    Procedure: COLONOSCOPY;  Surgeon: Blane Valdez MD;  Location: [facility] ENDO (2ND FLR);  Service: Endoscopy;  Laterality: N/A;    COLONOSCOPY N/A 07/18/2019    Procedure: COLONOSCOPY;  Surgeon: Blane Valdez MD;  Location: [facility] ENDO (2ND FLR);  Service: Endoscopy;  Laterality: N/A;    CREATION OF ILIOFEMORAL ARTERY BYPASS Right 09/27/2022    Procedure: CREATION, BYPASS, ARTERIAL, ILIAC TO FEMORAL WITH GRAFT;  Surgeon: Zach Hernández MD;  Location: [facility] OR 2ND FLR;  Service: Peripheral Vascular;  Laterality: Right;    CREATION OF MUSCLE ROTATIONAL FLAP Right 09/27/2022    Procedure: CREATION, FLAP, MUSCLE ROTATION, SARTORIUS AND RECTUS FEMORIS;  Surgeon: Kole Tabares MD;  [facility];  Service: Plastics;  Laterality: Right;    ESOPHAGOGASTRODUODENOSCOPY N/A 07/17/2019    Procedure: EGD (ESOPHAGOGASTRODUODENOSCOPY);  Surgeon: Blane Valdez MD;  Location: [facility] ENDO (2ND FLR);  Service: Endoscopy;  Laterality: N/A;    ESOPHAGOGASTRODUODENOSCOPY N/A 07/18/2019    Procedure: EGD (ESOPHAGOGASTRODUODENOSCOPY);  Surgeon: Blane Valdez MD;  Location: [facility] ENDO (2ND FLR);  Service: Endoscopy;  Laterality: N/A;    FOREIGN BODY REMOVAL N/A 07/22/2022    Procedure: REMOVAL, FOREIGN BODY;  Surgeon: Yg Kaufman MD;  Location: [facility] OR [address] [place] [address];  Service:  Cardiovascular;  Laterality: N/A;  LVAD Heartmate 2 drive line removal    INSERTION OF GRAFT TO PERICARDIUM N/A 07/15/2022    Procedure: INSERTION, GRAFT, PERICARDIUM;  Surgeon: Yg Kaufman MD;  [facility];  Service: Cardiovascular;  Laterality: N/A;    IRRIGATION OF MEDIASTINUM N/A 07/15/2022    Procedure: IRRIGATION, MEDIASTINUM;  Surgeon: Yg Kaufman MD;  [facility];  Service: Cardiovascular;  Laterality: N/A;    LYSIS OF ADHESIONS  07/13/2022    Procedure: LYSIS, ADHESIONS;  Surgeon: Yg Kaufman MD;  [facility];  Service: Cardiovascular;;    NONINVASIVE CARDIAC ELECTROPHYSIOLOGY STUDY N/A 10/18/2019    Procedure: CARDIAC ELECTROPHYSIOLOGY STUDY, NONINVASIVE;  Surgeon: Raz Wagner MD;  Location: [facility] EP LAB;  Service: Cardiology;  Laterality: N/A;  VT, DFTs, MDT CRTD in situ, LVAD, anes, MB, 3098    REPLACEMENT OF IMPLANTABLE CARDIOVERTER-DEFIBRILLATOR (ICD) GENERATOR N/A 03/09/2020    Procedure: REPLACEMENT, ICD GENERATOR;  Surgeon: Harry Yun MD;  Location: [facility] EP LAB;  Service: Cardiology;  Laterality: N/A;  VT, ICD Gen Change and Lead Revision, MDT, MAC, DM,3 Prep    REPLACEMENT OF LEFT VENTRICULAR ASSIST DEVICE (LVAD)  07/13/2022    Procedure: REPLACEMENT, LVAD;  Surgeon: Yg Kaufman MD;  [facility];  Service: Cardiovascular;;    REPLACEMENT OF PUMP N/A 07/13/2022    Procedure: REPLACEMENT, PUMP;  Surgeon: Yg Kaufman MD;  [facility];  Service: Cardiovascular;  Laterality: N/A;  LVAD pump exchange  EXPLANATION OF HEATMATE 2  IMPLANTATION OF HEARTMATE 3  IMPLANTATION OF 8MM CHIMNEY GRAFT TO RFA  INITIATION OF ECMO  TEMPORARY CLOSURE OF CHEST    REVISION OF IMPLANTABLE CARDIOVERTER-DEFIBRILLATOR (ICD) ELECTRODE LEAD PLACEMENT N/A 03/09/2020    Procedure: REVISION, INSERTION, ELECTRODE LEAD, ICD;  Surgeon: Harry Yun MD;  Location: [facility] EP LAB;  Service: Cardiology;  Laterality: N/A;  VT, ICD Gen  Change and Lead Revision, MDT, MAC, DM,3 Prep    RIGHT HEART CATHETERIZATION Right 2023    Procedure: INSERTION, CATHETER, RIGHT HEART;  Surgeon: Gaurav Rodriguez MD;  Location: [facility] CATH LAB;  Service: Cardiology;  Laterality: Right;    STERNAL WOUND CLOSURE N/A 2022    Procedure: CLOSURE, WOUND, STERNUM;  Surgeon: Yg Kaufman MD;  Location: [facility] OR [facility] FLR;  Service: Cardiovascular;  Laterality: N/A;  temporary closure  evacuation of hematoma    STERNAL WOUND CLOSURE N/A 07/15/2022    Procedure: CLOSURE, WOUND, STERNUM;  Surgeon: Yg Kaufman MD;  Location: [facility] OR [facility] FLR;  Service: Cardiovascular;  Laterality: N/A;    TRACHEOSTOMY N/A 2022    Procedure: CREATION, TRACHEOSTOMY;  Surgeon: Germain Holt MD;  Location: [facility] OR [facility] FLR;  Service: General;  Laterality: N/A;    TREATMENT OF CARDIAC ARRHYTHMIA  10/18/2019    Procedure: Cardioversion or Defibrillation;  Surgeon: Raz Wagner MD;  Location: [facility] EP LAB;  Service: Cardiology;;       Social History     Socioeconomic History    Marital status:     Number of children: 3   Occupational History     Employer: [employer]    Tobacco Use    Smoking status: Former     Current packs/day: 0.00     Types: Vaping w/o nicotine, Cigarettes     Start date: 2018     Quit date: 2018     Years since quittin.2     Passive exposure: Never    Smokeless tobacco: Never    Tobacco comments:     pt is quiting on his own - pt stated not qualified for program;  pt  quit on his own   Substance and Sexual Activity    Alcohol use: Never     Comment: quit    Drug use: Never    Sexual activity: Not Currently     Partners: Female     Birth control/protection: None     Comment: 10/5/17  with same partner 7 years    Social History Narrative    Disabled     Social Drivers of Health     Financial Resource Strain: Low Risk  (3/6/2025)    Overall Financial Resource Strain (CARDIA)     Difficulty of Paying Living Expenses: Not  hard at all   Food Insecurity: No Food Insecurity (3/6/2025)    Hunger Vital Sign     Worried About Running Out of Food in the Last Year: Never true     Ran Out of Food in the Last Year: Never true   Transportation Needs: Patient Declined (1/16/2024)    PRAPARE - Transportation     Lack of Transportation (Medical): Patient declined     Lack of Transportation (Non-Medical): Patient declined   Physical Activity: Insufficiently Active (1/16/2024)    Exercise Vital Sign     Days of Exercise per Week: 3 days     Minutes of Exercise per Session: 20 min   Stress: No Stress Concern Present (3/6/2025)    [employer] - Occupational Stress Questionnaire     Feeling of Stress : Only a little   Housing Stability: Low Risk  (3/6/2025)    Housing Stability Vital Sign     Unable to Pay for Housing in the Last Year: No     Homeless in the Last Year: No       Family History   Problem Relation Name Age of Onset    Hypertension Father Kolby Richards     Diabetes Father Kolby Richards     Coronary artery disease Father Kolby Richards     Heart disease Father Kolby Richards         CHF    No Known Problems Mother      Cancer Sister Kelly Forde 54        breast CA    No Known Problems Brother      Anxiety disorder Neg Hx      Depression Neg Hx      Dementia Neg Hx      Bipolar disorder Neg Hx      Suicide Neg Hx         Physical Exam:      Vitals:    04/20/25 0926   BP:    Pulse: (!) 220   Resp:    Temp:      Physical Exam    Gen:  Hemodynamically stable with the support of an LVAD  Mental Status:   Alert and oriented x3.  Appropriate conversant   Skin: Warm, dry. No rashes seen.  Some purulent material coming from the area around the driveline.  Eyes: No conjunctival injection.  Pulm: CTAB. No increased work of breathing.  No significant tachypnea.  No conversational dyspnea.    CV:  LVAD self test past.  LVAD functioning appropriately.  Abd: Soft.  Not distended.  Nontender.   MSK: No deformities.  No pitting  edema  Neuro: Awake. Speech normal. No focal neuro deficit observed.      Procedures  Laboratory Studies:  Labs Reviewed   COMPREHENSIVE METABOLIC PANEL - Abnormal       Result Value    Sodium 136      Potassium 3.5      Chloride 100      CO2 22 (*)     Glucose 83      BUN 24 (*)     Creatinine 2.3 (*)     Calcium 8.9      Protein Total 8.8 (*)     Albumin 3.9      Bilirubin Total 0.3            AST 38      ALT 19      Anion Gap 14      eGFR 32 (*)    TROPONIN I HIGH SENSITIVITY - Abnormal    Troponin High Sensitive 61 (*)    B-TYPE NATRIURETIC PEPTIDE - Abnormal     (*)    CBC WITH DIFFERENTIAL - Abnormal    WBC 6.72      RBC 4.59 (*)     HGB 13.2 (*)     HCT 41.7      MCV 91      MCH 28.8      MCHC 31.7 (*)     RDW 15.0 (*)     Platelet Count 244      MPV 9.9      Nucleated RBC 0      Neut % 40.0      Lymph % 48.5 (*)     Mono % 9.8      Eos % 1.2      Basophil % 0.4      Imm Grans % 0.1      Neut # 2.68      Lymph # 3.26      Mono # 0.66      Eos # 0.08      Baso # 0.03      Imm Grans # 0.01     TSH - Abnormal    TSH 4.710 (*)    ISTAT PROCEDURE - Abnormal    POC Glucose 87      POC BUN 24      POC Creatinine 2.7 (*)     POC Sodium 136      POC Potassium 3.5      POC Chloride 101      POC TCO2 (MEASURED) 23      POC Ionized Calcium 1.03 (*)     POC Hematocrit 43      Sample MAEVE     HEPATITIS C ANTIBODY - Normal    Hep C Ab Interp Non-Reactive     MAGNESIUM - Normal    Magnesium  1.9     PHOSPHORUS - Normal    Phosphorus Level 2.9     CBC W/ AUTO DIFFERENTIAL    Narrative:     The following orders were created for panel order CBC auto differential.                  Procedure                               Abnormality         Status                                     ---------                               -----------         ------                                     CBC with Differential[[id number]]       Abnormal            Final result                                                 Please view  results for these tests on the individual orders.   HEP C VIRUS HOLD SPECIMEN   HIV 1 / 2 ANTIBODY   MAGNESIUM   CBC W/ AUTO DIFFERENTIAL    Narrative:     The following orders were created for panel order CBC auto differential.                  Procedure                               Abnormality         Status                                     ---------                               -----------         ------                                     CBC with Differential[[id number]]                           In process                                                   Please view results for these tests on the individual orders.   PHOSPHORUS   APTT   PROTIME-INR   CBC WITH DIFFERENTIAL   T4, FREE   ISTAT CHEM8       Chart reviewed. ICD [employer] A219, serial number [id number], implanted 14 sep 2020  Lead [employer] 3501, serial [id number], implanted 14 sep 2020    Imaging Results    None         Medications Given:  Medications   amiodarone 360 mg/200 mL (1.8 mg/mL) infusion (0 mg/min Intravenous Stopped 4/20/25 0943)   amiodarone 360 mg/200 mL (1.8 mg/mL) infusion (has no administration in time range)   magnesium sulfate 2g in water 50mL IVPB (premix) (2 g Intravenous New Bag 4/20/25 0918)   metoprolol injection 5 mg (0 mg Intravenous Hold 4/20/25 0940)   LORazepam injection 1 mg (1 mg Intravenous Given 4/20/25 0836)   amiodarone in dextrose 150 mg/100 mL (1.5 mg/mL) loading dose 150 mg (0 mg Intravenous Stopped 4/20/25 0850)   potassium bicarbonate disintegrating tablet 40 mEq (40 mEq Oral Given 4/20/25 0844)   potassium chloride 10 mEq in 100 mL IVPB (10 mEq Intravenous New Bag 4/20/25 0918)   LORazepam injection 1 mg (1 mg Intravenous Given 4/20/25 0919)   furosemide injection 40 mg (40 mg Intravenous Given 4/20/25 0941)       ED Course as of 04/20/25 0950   Sun Apr 20, 2025 0843 Rhythm strip  Per my independent interpretation of the patient's is in a monomorphic V-tach rhythm with a rate of a proximally  150 [VC]   0843 ISTAT PROCEDURE(!)  No significant abnormalities on i-STAT Chem 8.  We have provided oral potassium repletion [VC]   0903 [id number] Score for Atrial Fibrillation Stroke Risk - MDCalc  Calculated on Apr 20 2025 9:27 AM  2 points -> [id number] Score  Anticoagulation should be recommended -> Ischemic stroke incidence rate: 2.9 per 100 patient years [VC]      ED Course User Index  [VC] Dk Squires MD       Discussed with:  Cardiology.  The recommendations were to provide amnio bolus and drip and they will see the patient.  Cardiology will admit the patient to the cardiac ICU for further management.    MDM:    59 y.o. male with IED defibrillation    Electrolyte abnormality, VT storm, device malfunction    Patient has a witnessed discharged in our department when he was in a monomorphic V-tach rhythm.  Patient has remained hemodynamically stable secondary to LVAD function.    The patient balance between V-tach and 1 episode of VFib with urge several cardioversions under the supervision of the cardiac team.  Patient has remained hemodynamically stable throughout.  Patient has been admitted to cardiac ICU for further management.      Medical Decision Making  Amount and/or Complexity of Data Reviewed  Labs: ordered. Decision-making details documented in ED Course.  ECG/medicine tests:  Decision-making details documented in ED Course.    Risk  Prescription drug management.  Decision regarding hospitalization.         Diagnostic Impression:    1. Ventricular tachycardia, incessant    2. LVAD (left ventricular assist device) present    3. VT (ventricular tachycardia)         ED Disposition Condition    Admit                  Patient and/or family understands the plan and is in agreement, verbalized understanding, questions answered                [1]   No current facility-administered medications on file prior to encounter.     Current Outpatient Medications on File Prior to Encounter   Medication Sig  Dispense Refill    amiodarone (PACERONE) 200 MG Tab Take 2 tablets (400 mg total) by mouth once daily. 180 tablet 3    amLODIPine (NORVASC) 10 MG tablet Take 1 tablet (10 mg total) by mouth once daily. 90 tablet 3    cefadroxil (DURICEF) 500 MG Cap Take 1 capsule (500 mg total) by mouth every 12 (twelve) hours. 60 capsule 4    cyanocobalamin 1,000 mcg/mL injection INJECT 1 ML (1,000 MCG TOTAL) INTO THE SKIN ONCE A WEEK. FOR 24 DOSES 36 mL 1    doxycycline (VIBRAMYCIN) 100 MG Cap Take 1 capsule (100 mg total) by mouth 2 (two) times daily. 60 capsule 0    ferrous gluconate 324 mg (37.5 mg iron) Tab tablet TAKE 1 TABLET BY MOUTH 2 TIMES DAILY WITH MEALS. 180 tablet 3    levothyroxine (SYNTHROID) 125 MCG tablet Take 1 tablet (125 mcg total) by mouth before breakfast. 30 tablet 2    magnesium oxide (MAG-OX) 400 mg (241.3 mg magnesium) tablet Take 1 tablet (400 mg total) by mouth once daily. 90 tablet 3    metoprolol succinate (TOPROL-XL) 25 MG 24 hr tablet Take 0.5 tablets (12.5 mg total) by mouth 2 (two) times daily. 90 tablet 3    mirtazapine (REMERON) 45 MG tablet Take 1 tablet (45 mg total) by mouth every evening. 90 tablet 1    omega-3 acid ethyl esters (LOVAZA) 1 gram capsule Take 2 capsules (2 g total) by mouth 2 (two) times daily. 360 capsule 3    pantoprazole (PROTONIX) 40 MG tablet Take 1 tablet (40 mg total) by mouth daily with lunch. 90 tablet 3    potassium chloride SA (K-DUR,KLOR-CON) 20 MEQ tablet TAKE 1 TABLET WITH EVERY LASIX 40 MG DOSE 36 tablet 9    torsemide (DEMADEX) 20 MG Tab Take 1 tablets (20mg) on Mondays. 90 tablet 3    warfarin (COUMADIN) 5 MG tablet Take 1-1.5 tablets (5-7.5 mg total) by mouth Daily. As directed by the [facility] 135 tablet 3    zolpidem (AMBIEN CR) 12.5 MG CR tablet TAKE 1 TABLET (12.5 MG TOTAL) BY MOUTH NIGHTLY AS NEEDED FOR INSOMNIA. 30 tablet 5    [DISCONTINUED] sildenafiL (VIAGRA) 100 MG tablet Take 1 tablet (100 mg total) by mouth daily as needed for Erectile  Dysfunction. 6 tablet 3        Dk Squires MD  Resident  04/20/25 0965

## 2025-04-20 NOTE — ED TRIAGE NOTES
Tim Richards, a 59 y.o. male presents to the ED w/ complaint of pacermaker fired 5 times this am. Pt is LVAD pt and states that no alarms have gone off. Pt states that device started firing Thursday and it has increased over the last few days.     Triage note:  Chief Complaint   Patient presents with    Pacemaker Problem     Fired 5 times.  Lvad pt with no alarms. denies symptoms     Review of patient's allergies indicates:   Allergen Reactions    Lisinopril Anaphylaxis    Hydralazine     Hydralazine analogues      Chronic constipation, impotence, dizziness     Past Medical History:   Diagnosis Date    A-fib     Anticoagulant long-term use     Atrial flutter 7/30/2022    CHF (congestive heart failure)     Class 1 obesity due to excess calories with serious comorbidity and body mass index (BMI) of 31.0 to 31.9 in adult     Class 1 obesity due to excess calories with serious comorbidity and body mass index (BMI) of 32.0 to 32.9 in adult     Dilated cardiomyopathy 1/10/2018    Disorder of kidney and ureter     CKD    Encounter for blood transfusion     Essential hypertension 8/28/2022    Gout     HTN (hypertension)     Hx of psychiatric care     ICD (implantable cardioverter-defibrillator) infection 7/1/2020    Psychiatric problem     Thyroid disease     Ventricular tachycardia (paroxysmal)

## 2025-04-20 NOTE — HPI
59 year-old-man with stage D HFrEF s/p ICD (2014) and who was previously supported with a HM2 (implanted 3/8/2018 as DT-VAD) but required pump exchange (HM3 pump exchange 7/13/2022) for thrombosis of pump post COVID-19 PNA and AHRF. Post-op course following pump exchange was complicated by worsening cardiogenic shock/RV failure requiring VA-ECMO. He also had recurrent VT as well as atrial flutter with RVR and is on chronic amiodarone and mexiletine. In June/July 2022 he was tapered of steroids (on for amiodarone hyperthyroid) due to concern for avascular necrosis of hip.    Patient is presenting today to the ED complaining of multiple SICD fires (5 times this AM). He states that his SICD started firings since last Thursday and have been increasing. Hence he came to look for medical care. During firing events patient was not exerting himself and he did not have any symptoms priorto  pacemaker shocks         EP background history:  Infected TV-ICD 2020.  SICD ([employer]) implanted 9/14/2020. Had shocks for VT 12/2021. Mexiletine added (later increased). Last time seen on 4/17/2024 by Dr. Yun.  Mexilitin recently stopped on 3/26/2025 due to insurance not covering it. Patient started on Metoprolol XL 12.5 mg BID (advised by Dr. Fay)    Currently on Amiodarone 400 mg daily and BB as above.

## 2025-04-20 NOTE — ASSESSMENT & PLAN NOTE
- Formal device interrogation   - Bolus of amiodarone   - Increase beta blocker   - Keep K > 4 and Mg > 2  - Telemetry

## 2025-04-20 NOTE — ASSESSMENT & PLAN NOTE
Received multiple ICD shocks today.  Given this is not hyperthyroidism, no concern for amiodarone acutely worsening thyroid function and therefore impacting heart rhythm.  Managed by cardiology.

## 2025-04-20 NOTE — ASSESSMENT & PLAN NOTE
Procedure: Device Interrogation Including analysis of device parameters  Current Settings: Ventricular Assist Device  Review of device function is stable/unstable stable        4/21/2025    10:05 AM 4/21/2025     9:05 AM 4/21/2025     8:05 AM 4/21/2025     7:05 AM 4/21/2025     6:01 AM 4/21/2025     5:01 AM 4/21/2025     4:01 AM   TXP LVAD INTERROGATIONS   Type HeartMate3 HeartMate3 HeartMate3 HeartMate3 HeartMate3 HeartMate3 HeartMate3   Flow 5.9 5.9 5.9 5.9 5.8 5.8 5.8   Speed 6300 6300 6300 6300 6300 6300 6300   PI 1.7 1.8 1.7 1.6 4.6 1.6 1.5   Power ([place]) 5.6 5.5 5.7 5.5 5.6 5.6 5.5   LSL 5900 5900 5900 5900 5900 5900 5900   Pulsatility Intermittent pulse Intermittent pulse Intermittent pulse Intermittent pulse Intermittent pulse Intermittent pulse Intermittent pulse

## 2025-04-20 NOTE — PLAN OF CARE
[facility] - Cardiac Intensive Care  Initial Discharge Assessment       Primary Care Provider: Diego Daniel MD    Admission Diagnosis: VT (ventricular tachycardia) [I47.20]  Ventricular tachycardia, incessant [I47.29]  LVAD (left ventricular assist device) present [Z95.811]    Admission Date: 4/20/2025  Expected Discharge Date:     Transition of Care Barriers: (P) Substance Abuse, Does not adhere to care plan    Payor: [employer] MED[employer]ARE ADVANTAGE / Plan: [employer] MCARE ADV / Product Type: Medicare Advantage /     Extended Emergency Contact Information  Primary Emergency Contact: Kelly Richards   [facility]  Home Phone: [phone]  Mobile Phone: [phone]  Relation: Spouse  Preferred language: English   needed? No    Discharge Plan A: (P) Home Health, Home with family  Discharge Plan B: (P) Home with family      [id number] [place] - [address]  [address]  [place] [address]  Phone: [phone] Fax: [phone]    [facility]  [address] [facility] [place] [address]      [facility]  [address] [address]  Phone: [phone] Fax: [phone]      Initial Assessment (most recent)       Adult Discharge Assessment - 04/20/25 1108          Discharge Assessment    Assessment Type Discharge Planning Assessment (P)      Confirmed/corrected address, phone number and insurance Yes (P)      Confirmed Demographics Correct on Facesheet (P)      Source of Information patient;health record (P)      Communicated [place] with patient/caregiver Yes (P)      People in Home spouse (P)      Do you expect to return to your current living situation? Yes (P)      Do you have help at home or someone to help you manage your care at home? Yes (P)      Prior to hospitilization cognitive status: Alert/Oriented (P)      Current cognitive status: Alert/Oriented (P)    lethargic in the E/R     Equipment Currently Used at Home other (see comments);orlando aguilar (P)    LVAD    Readmission within 30 days? No (P)      Do you currently have service(s) that help you manage your care at home? No (P)      Do you take prescription medications? Yes (P)      Do you have prescription coverage? Yes (P)      Do you have any problems affording any of your prescribed medications? No (P)      Is the patient taking medications as prescribed? yes (P)      How do you get to doctors appointments? family or friend will provide (P)      Are you on dialysis? No (P)      Do you take coumadin? Yes (P)      Discharge Plan A Home Health;Home with family (P)      Discharge Plan B Home with family (P)      DME Needed Upon Discharge  none (P)      Discharge Plan discussed with: Patient (P)      Transition of Care Barriers Substance Abuse;Does not adhere to care plan (P)         Physical Activity    On average, how many days per week do you engage in moderate to strenuous exercise (like a brisk walk)? 0 days (P)         Financial Resource Strain    How hard is it for you to pay for the very basics like food, housing, medical care, and heating? Not very hard (P)         Housing Stability    In the last 12 months, was there a time when you were not able to pay the mortgage or rent on time? No (P)      At any time in the past 12 months, were you homeless or living in a shelter (including now)? No (P)         Transportation Needs    In the past 12 months, has lack of transportation kept you from medical appointments or from getting medications? No (P)      In the past 12 months, has lack of transportation kept you from meetings, work, or from getting things needed for daily living? No (P)         Food Insecurity    Within the past 12 months, you worried that your food would run out before you got the money to buy more. Never true (P)         Stress    Do you feel stress - tense, restless, nervous, or anxious, or unable to sleep at night  because your mind is troubled all the time - these days? To some extent (P)         Alcohol Use    Q1: How often do you have a drink containing alcohol? Patient declined (P)    per last admission in March, etoh intake is a major concern       Utilities    In the past 12 months has the electric, gas, oil, or water company threatened to shut off services in your home? No (P)         Health Literacy    How often do you need to have someone help you when you read instructions, pamphlets, or other written material from your doctor or pharmacy? Sometimes (P)         OTHER    Name(s) of People in Home michaela, Kelly (P)

## 2025-04-20 NOTE — SUBJECTIVE & OBJECTIVE
PMH, PSH, FH, SH updated and reviewed     ROS:  Review of Systems   Constitutional: Negative.    HENT: Negative.     Gastrointestinal: Negative.    Endocrine: Negative.    Genitourinary: Negative.    Neurological: Negative.        Labs Reviewed and Include:    Recent Labs   Lab 04/20/25  0827   CALCIUM 8.9   ALBUMIN 3.9      K 3.5   CO2 22*      BUN 24*   CREATININE 2.3*   ALKPHOS 113   ALT 19   AST 38   BILITOT 0.3     Lab Results   Component Value Date    HGBA1C 5.4 04/26/2021       Nutritional status:   Body mass index is 31.85 kg/m².  Lab Results   Component Value Date    ALBUMIN 3.9 04/20/2025    ALBUMIN 3.8 03/26/2025    ALBUMIN 3.6 03/07/2025     Lab Results   Component Value Date    PREALBUMIN 20 03/26/2025    PREALBUMIN 20 03/07/2025    PREALBUMIN 23 11/06/2024       Estimated Creatinine Clearance: 45.4 mL/min (A) (based on SCr of 2.3 mg/dL (H)).    POCT Glucose results:    Current Insulin Regimen:       PHYSICAL EXAMINATION:  Vitals:    04/20/25 1300   BP: 90/70   Pulse: 110   Resp: 16   Temp:      Body mass index is 31.85 kg/m².     Physical Exam  Vitals reviewed.   Constitutional:       General: He is not in acute distress.     Appearance: Normal appearance. He is not ill-appearing.   Eyes:      Extraocular Movements: Extraocular movements intact.      Conjunctiva/sclera: Conjunctivae normal.   Pulmonary:      Effort: Pulmonary effort is normal. No respiratory distress.   Musculoskeletal:      Right lower leg: No edema.      Left lower leg: No edema.   Skin:     General: Skin is warm and dry.   Neurological:      General: No focal deficit present.      Mental Status: He is alert and oriented to person, place, and time.   Psychiatric:         Mood and Affect: Mood normal.         Behavior: Behavior normal.         Thought Content: Thought content normal.

## 2025-04-20 NOTE — HPI
58 yo black male with stage D HFrEF s/p HM2 (implanted 3/8/2018 as DT-VAD) but required pump exchange (HM3 pump exchange 7/13/2022) who presents to the ED after getting shocked 17 times by his device. In the ED he was in VT and was started on amiodarone. When he was receiving IV metoprolol his VT rate went from 130 to 180 to 240 to VF and then he was shocked. He was awake and otherwise felt fine. He is not sure what may have caused this; he has been compliant with medications. He was recently switched from doxycycline to cefadroxil and 2 weeks ago was taken off of xanax.    He has a history of VT and is currently on amiodarone and mexiletine and metoprolol. He also has a history of amiodarone induced thyrotoxicosis. TSH today 4.7. He has a history of alcohol abuse but reports he has been sober for a few months. iSTAT labs showed K 3.5. He is volume overloaded on exam.

## 2025-04-20 NOTE — SUBJECTIVE & OBJECTIVE
Past Medical History:   Diagnosis Date    A-fib     Anticoagulant long-term use     Atrial flutter 7/30/2022    CHF (congestive heart failure)     Class 1 obesity due to excess calories with serious comorbidity and body mass index (BMI) of 31.0 to 31.9 in adult     Class 1 obesity due to excess calories with serious comorbidity and body mass index (BMI) of 32.0 to 32.9 in adult     Dilated cardiomyopathy 1/10/2018    Disorder of kidney and ureter     CKD    Encounter for blood transfusion     Essential hypertension 8/28/2022    Gout     HTN (hypertension)     Hx of psychiatric care     ICD (implantable cardioverter-defibrillator) infection 7/1/2020    Psychiatric problem     Thyroid disease     Ventricular tachycardia (paroxysmal)        Past Surgical History:   Procedure Laterality Date    AORTIC VALVULOPLASTY N/A 07/13/2022    Procedure: REPAIR, AORTIC VALVE;  Surgeon: Yg Kaufman MD;  Location: [facility] OR [address] [place] [address];  Service: Cardiovascular;  Laterality: N/A;    APPLICATION OF WOUND VACUUM-ASSISTED CLOSURE DEVICE N/A 07/15/2022    Procedure: APPLICATION, WOUND VAC;  Surgeon: Yg Kaufman MD;  [facility];  Service: Cardiovascular;  Laterality: N/A;  50 x 5 cm     APPLICATION OF WOUND VACUUM-ASSISTED CLOSURE DEVICE Right 09/27/2022    Procedure: APPLICATION, WOUND VAC;  Surgeon: Kole Tabares MD;  Location: [facility] OR [address] [place] [address];  Service: Plastics;  Laterality: Right;    CARDIAC CATHETERIZATION  12/2012    CARDIAC DEFIBRILLATOR PLACEMENT Left     CRRT-D    CARDIAC VALVE REPLACEMENT  03/08/2018    LVAD Implant    COLONOSCOPY N/A 03/06/2018    Procedure: COLONOSCOPY;  Surgeon: Alonso Bone MD;  Location: [address]);  Service: Endoscopy;  Laterality: N/A;    COLONOSCOPY N/A 07/17/2019    Procedure: COLONOSCOPY;  Surgeon: Blane Valdez MD;  [facility]  Service: Endoscopy;  Laterality: N/A;    COLONOSCOPY N/A 07/18/2019    Procedure: COLONOSCOPY;  Surgeon: Blane Valdez MD;   Location: [facility] ENDO (2ND FLR);  Service: Endoscopy;  Laterality: N/A;    CREATION OF ILIOFEMORAL ARTERY BYPASS Right 09/27/2022    Procedure: CREATION, BYPASS, ARTERIAL, ILIAC TO FEMORAL WITH GRAFT;  Surgeon: Zach Hernández MD;  [facility];  Service: Peripheral Vascular;  Laterality: Right;    CREATION OF MUSCLE ROTATIONAL FLAP Right 09/27/2022    Procedure: CREATION, FLAP, MUSCLE ROTATION, SARTORIUS AND RECTUS FEMORIS;  Surgeon: Kole Tabares MD;  [facility];  Service: Plastics;  Laterality: Right;    ESOPHAGOGASTRODUODENOSCOPY N/A 07/17/2019    Procedure: EGD (ESOPHAGOGASTRODUODENOSCOPY);  Surgeon: Blane Valdez MD;  Location: [facility] ENDO (2ND FLR);  Service: Endoscopy;  Laterality: N/A;    ESOPHAGOGASTRODUODENOSCOPY N/A 07/18/2019    Procedure: EGD (ESOPHAGOGASTRODUODENOSCOPY);  Surgeon: Blane Valdez MD;  Location: [facility] ENDO (2ND FLR);  Service: Endoscopy;  Laterality: N/A;    FOREIGN BODY REMOVAL N/A 07/22/2022    Procedure: REMOVAL, FOREIGN BODY;  Surgeon: Yg Kaufman MD;  [facility];  Service: Cardiovascular;  Laterality: N/A;  LVAD Heartmate 2 drive line removal    INSERTION OF GRAFT TO PERICARDIUM N/A 07/15/2022    Procedure: INSERTION, GRAFT, PERICARDIUM;  Surgeon: Yg Kaufman MD;  [facility];  Service: Cardiovascular;  Laterality: N/A;    IRRIGATION OF MEDIASTINUM N/A 07/15/2022    Procedure: IRRIGATION, MEDIASTINUM;  Surgeon: Yg Kaufman MD;  [facility];  Service: Cardiovascular;  Laterality: N/A;    LYSIS OF ADHESIONS  07/13/2022    Procedure: LYSIS, ADHESIONS;  Surgeon: Yg Kaufman MD;  [facility];  Service: Cardiovascular;;    NONINVASIVE CARDIAC ELECTROPHYSIOLOGY STUDY N/A 10/18/2019    Procedure: CARDIAC ELECTROPHYSIOLOGY STUDY, NONINVASIVE;  Surgeon: Raz Wagner MD;  Location: [facility] EP LAB;  Service: Cardiology;  Laterality: N/A;  VT, DFTs, MDT CRTD in situ, LVAD, LASHELL pizarro, [id number]     REPLACEMENT OF IMPLANTABLE CARDIOVERTER-DEFIBRILLATOR (ICD) GENERATOR N/A 03/09/2020    Procedure: REPLACEMENT, ICD GENERATOR;  Surgeon: Harry Yun MD;  Location: [facility] EP LAB;  Service: Cardiology;  Laterality: N/A;  VT, ICD Gen Change and Lead Revision, MDT, MAC, DM,3 Prep    REPLACEMENT OF LEFT VENTRICULAR ASSIST DEVICE (LVAD)  07/13/2022    Procedure: REPLACEMENT, LVAD;  Surgeon: Yg Kaufman MD;  [address]  Service: Cardiovascular;;    REPLACEMENT OF PUMP N/A 07/13/2022    Procedure: REPLACEMENT, PUMP;  Surgeon: Yg Kaufman MD;  [facility];  Service: Cardiovascular;  Laterality: N/A;  LVAD pump exchange  EXPLANATION OF HEATMATE 2  IMPLANTATION OF HEARTMATE 3  IMPLANTATION OF 8MM CHIMNEY GRAFT TO RFA  INITIATION OF ECMO  TEMPORARY CLOSURE OF CHEST    REVISION OF IMPLANTABLE CARDIOVERTER-DEFIBRILLATOR (ICD) ELECTRODE LEAD PLACEMENT N/A 03/09/2020    Procedure: REVISION, INSERTION, ELECTRODE LEAD, ICD;  Surgeon: Harry Yun MD;  Location: [facility] EP LAB;  Service: Cardiology;  Laterality: N/A;  VT, ICD Gen Change and Lead Revision, MDT, MAC, DM,3 Prep    RIGHT HEART CATHETERIZATION Right 09/08/2023    Procedure: INSERTION, CATHETER, RIGHT HEART;  Surgeon: Gaurav Rodriguez MD;  Location: [facility] CATH LAB;  Service: Cardiology;  Laterality: Right;    STERNAL WOUND CLOSURE N/A 07/14/2022    Procedure: CLOSURE, WOUND, STERNUM;  Surgeon: Yg Kaufman MD;  [address]  Service: Cardiovascular;  Laterality: N/A;  temporary closure  evacuation of hematoma    STERNAL WOUND CLOSURE N/A 07/15/2022    Procedure: CLOSURE, WOUND, STERNUM;  Surgeon: Yg Kaufman MD;  [facility];  Service: Cardiovascular;  Laterality: N/A;    TRACHEOSTOMY N/A 08/04/2022    Procedure: CREATION, TRACHEOSTOMY;  Surgeon: Germain Holt MD;  Location: [facility] OR [address] [place] [address];  Service: General;  Laterality: N/A;    TREATMENT OF CARDIAC ARRHYTHMIA  10/18/2019    Procedure:  Cardioversion or Defibrillation;  Surgeon: Raz Wagner MD;  Location: [facility] EP LAB;  Service: Cardiology;;       Review of patient's allergies indicates:   Allergen Reactions    Lisinopril Anaphylaxis    Hydralazine     Hydralazine analogues      Chronic constipation, impotence, dizziness       No current facility-administered medications on file prior to encounter.     Current Outpatient Medications on File Prior to Encounter   Medication Sig    amiodarone (PACERONE) 200 MG Tab Take 2 tablets (400 mg total) by mouth once daily.    amLODIPine (NORVASC) 10 MG tablet Take 1 tablet (10 mg total) by mouth once daily.    cefadroxil (DURICEF) 500 MG Cap Take 1 capsule (500 mg total) by mouth every 12 (twelve) hours.    cyanocobalamin 1,000 mcg/mL injection INJECT 1 ML (1,000 MCG TOTAL) INTO THE SKIN ONCE A WEEK. FOR 24 DOSES    doxycycline (VIBRAMYCIN) 100 MG Cap Take 1 capsule (100 mg total) by mouth 2 (two) times daily.    ferrous gluconate 324 mg (37.5 mg iron) Tab tablet TAKE 1 TABLET BY MOUTH 2 TIMES DAILY WITH MEALS.    levothyroxine (SYNTHROID) 125 MCG tablet Take 1 tablet (125 mcg total) by mouth before breakfast.    magnesium oxide (MAG-OX) 400 mg (241.3 mg magnesium) tablet Take 1 tablet (400 mg total) by mouth once daily.    metoprolol succinate (TOPROL-XL) 25 MG 24 hr tablet Take 0.5 tablets (12.5 mg total) by mouth 2 (two) times daily.    mirtazapine (REMERON) 45 MG tablet Take 1 tablet (45 mg total) by mouth every evening.    omega-3 acid ethyl esters (LOVAZA) 1 gram capsule Take 2 capsules (2 g total) by mouth 2 (two) times daily.    pantoprazole (PROTONIX) 40 MG tablet Take 1 tablet (40 mg total) by mouth daily with lunch.    potassium chloride SA (K-DUR,KLOR-CON) 20 MEQ tablet TAKE 1 TABLET WITH EVERY LASIX 40 MG DOSE    torsemide (DEMADEX) 20 MG Tab Take 1 tablets (20mg) on Mondays.    warfarin (COUMADIN) 5 MG tablet Take 1-1.5 tablets (5-7.5 mg total) by mouth Daily. As directed by the Coumadin  Clinic    zolpidem (AMBIEN CR) 12.5 MG CR tablet TAKE 1 TABLET (12.5 MG TOTAL) BY MOUTH NIGHTLY AS NEEDED FOR INSOMNIA.    [DISCONTINUED] sildenafiL (VIAGRA) 100 MG tablet Take 1 tablet (100 mg total) by mouth daily as needed for Erectile Dysfunction.     Family History       Problem Relation (Age of Onset)    Cancer Sister (54)    Coronary artery disease Father    Diabetes Father    Heart disease Father    Hypertension Father    No Known Problems Mother, Brother          Tobacco Use    Smoking status: Former     Current packs/day: 0.00     Types: Vaping w/o nicotine, Cigarettes     Start date: 2018     Quit date: 2018     Years since quittin.2     Passive exposure: Never    Smokeless tobacco: Never    Tobacco comments:     pt is quiting on his own - pt stated not qualified for program;  pt  quit on his own   Substance and Sexual Activity    Alcohol use: Never     Comment: quit    Drug use: Never    Sexual activity: Not Currently     Partners: Female     Birth control/protection: None     Comment: 10/5/17  with same partner 7 years      Review of Systems   Constitutional: Negative.   HENT: Negative.     Eyes: Negative.    Cardiovascular: Negative.    Respiratory: Negative.     Endocrine: Negative.    Skin: Negative.    Musculoskeletal: Negative.    Gastrointestinal: Negative.    Genitourinary: Negative.    Neurological: Negative.    Psychiatric/Behavioral: Negative.       Objective:     Vital Signs (Most Recent):  Temp: 98.1 °F (36.7 °C) (25 1153)  Pulse: 65 (25 1153)  Resp: 16 (25 1153)  BP: (!) 78/0 (25 1109)  SpO2: 96 % (25 1153) Vital Signs (24h Range):  Temp:  [98.1 °F (36.7 °C)-98.6 °F (37 °C)] 98.1 °F (36.7 °C)  Pulse:  [] 65  Resp:  [15-24] 16  SpO2:  [95 %-98 %] 96 %  BP: (78-86)/(0) 78/0     Weight: 109.5 kg (241 lb 6.5 oz)  Body mass index is 31.85 kg/m².    SpO2: 96 %         Intake/Output Summary (Last 24 hours) at 2025 1156  Last data  filed at 4/20/2025 1131  Gross per 24 hour   Intake 118 ml   Output 400 ml   Net -282 ml       Lines/Drains/Airways       Line  Duration                  VAD 07/13/22 1300 Left ventricular assist device HeartMate 3 1011 days              Peripheral Intravenous Line  Duration                  Peripheral IV - Single Lumen 04/20/25 0829 20 G Anterior;Distal;Right Upper Arm <1 day         Peripheral IV - Single Lumen 04/20/25 0910 20 G Anterior;Left Forearm <1 day                     Physical Exam  HENT:      Head: Normocephalic.      Mouth/Throat:      Mouth: Mucous membranes are moist.   Eyes:      Pupils: Pupils are equal, round, and reactive to light.   Cardiovascular:      Rate and Rhythm: Normal rate and regular rhythm.      Pulses:           Radial pulses are 2+ on the right side and 2+ on the left side.        Dorsalis pedis pulses are 2+ on the right side and 2+ on the left side.        Posterior tibial pulses are 2+ on the right side and 2+ on the left side.      Heart sounds: Normal heart sounds, S1 normal and S2 normal.      Comments: LVAD with hum heard  Pulmonary:      Effort: Pulmonary effort is normal.   Abdominal:      General: Abdomen is flat.   Musculoskeletal:         General: Normal range of motion.      Right lower leg: No edema.      Left lower leg: No edema.   Skin:     General: Skin is warm.      Capillary Refill: Capillary refill takes less than 2 seconds.   Neurological:      General: No focal deficit present.      Mental Status: He is alert.   Psychiatric:         Mood and Affect: Mood normal.          Significant Labs:   Recent Lab Results  (Last 5 results in the past 24 hours)        04/20/25  1036   04/20/25  1024   04/20/25  0941   04/20/25  0833   04/20/25  0827        Base Deficit   -0.3             Performed By:   Dwaine             Correct Temperature (PH)   7.369             Corrected Temperature (pCO2)   43.8             Corrected Temperature (pO2)   86.3             Specimen  source   Arterial             Albumin         3.9       ALP         113       ALT         19       Anion Gap         14       PTT     56.4  Comment: Refer to local heparin nomogram for intensity/dose specific therapeutic range.           AST         38       Baso #     0.02     0.03       Basophil %     0.6     0.4       BILIRUBIN TOTAL         0.3  Comment: For infants and newborns, interpretation of results should be based   on gestational age, weight and in agreement with clinical   observations.    Premature Infant recommended reference ranges:   0-24 hours:  <8.0 mg/dL   24-48 hours: <12.0 mg/dL   3-5 days:    <15.0 mg/dL   6-29 days:   <15.0 mg/dL       BIPAP   0             BNP         189  Comment: Values of less than 100 pg/ml are consistent with non-CHF populations.        BUN         24       Calcium         8.9       Chloride         100       CO2         22       Creatinine         2.3       eGFR         32  Comment: Estimated GFR calculated using the CKD-EPI creatinine (2021) equation.       Eos #     0.04     0.08       Eos %     1.2     1.2       FiO2   21.0             Free T4         1.35       Glucose         83       Gran # (ANC)     2.38     2.68       Hematocrit     36.5     41.7       Hemoglobin     11.4     13.2       Hepatitis C Ab         Non-Reactive       HIV 1/2 Ag/Ab         Reactive  Comment: Presumptive evidence of HIV-1 p24 Ag and/or HIV-1/HIV-2 Ab. The sample will be sent for HIV1/2 supplemental testing. The results will be issued separately.        Immature Grans (Abs)     0.01  Comment: Mild elevation in immature granulocytes is non specific and can be seen in a variety of conditions including stress response, acute inflammation, trauma and pregnancy. Correlation with other laboratory and clinical findings is essential.     0.01  Comment: Mild elevation in immature granulocytes is non specific and can be seen in a variety of conditions including stress response, acute  inflammation, trauma and pregnancy. Correlation with other laboratory and clinical findings is essential.       Immature Granulocytes     0.3     0.1       INR     2.7  Comment: Coumadin Therapy:    2.0 - 3.0 for INR for all indicators except mechanical heart valves    and antiphospholipid syndromes which should use 2.5 - 3.5.           Lymph #     0.58     3.26       Lymph %     16.7     48.5       Magnesium      2.0     1.9       MCH     27.9     28.8       MCHC     31.2     31.7       MCV     90     91       Mono #     0.44     0.66       Mono %     12.7     9.8       MPV     9.7     9.9       Neut %     68.5     40.0       nRBC     0     0       Phosphorus Level     2.8     2.9       Platelet Count     188     244       POC BUN       24         POC Chloride       101         POC Creatinine       2.7         POC Glucose       87         POC HCO3   24.1             POC Hematocrit       43         POC Ionized Calcium       1.03         POC PCO2   43.8             POC PH   7.369             POC PO2   86.3             POC Potassium       3.5         POC SATURATED O2   96.5             POC Sodium       136         POC TCO2 (MEASURED)       23         POC Temp   37.0             Potassium         3.5       PROTEIN TOTAL         8.8       PT     27.9           RBC     4.08     4.59       RDW     14.8     15.0       Sample       MAEVE         Sodium         136       Troponin I High Sensitivity 53         61       TSH         4.710       WBC     3.47     6.72

## 2025-04-20 NOTE — SUBJECTIVE & OBJECTIVE
Past Medical History:   Diagnosis Date    A-fib     Anticoagulant long-term use     Atrial flutter 7/30/2022    CHF (congestive heart failure)     Class 1 obesity due to excess calories with serious comorbidity and body mass index (BMI) of 31.0 to 31.9 in adult     Class 1 obesity due to excess calories with serious comorbidity and body mass index (BMI) of 32.0 to 32.9 in adult     Dilated cardiomyopathy 1/10/2018    Disorder of kidney and ureter     CKD    Encounter for blood transfusion     Essential hypertension 8/28/2022    Gout     HTN (hypertension)     Hx of psychiatric care     ICD (implantable cardioverter-defibrillator) infection 7/1/2020    Psychiatric problem     Thyroid disease     Ventricular tachycardia (paroxysmal)        Past Surgical History:   Procedure Laterality Date    AORTIC VALVULOPLASTY N/A 07/13/2022    Procedure: REPAIR, AORTIC VALVE;  Surgeon: Yg Kaufman MD;  Location: [facility] OR [address] [place] [address];  Service: Cardiovascular;  Laterality: N/A;    APPLICATION OF WOUND VACUUM-ASSISTED CLOSURE DEVICE N/A 07/15/2022    Procedure: APPLICATION, WOUND VAC;  Surgeon: Yg Kaufman MD;  [facility];  Service: Cardiovascular;  Laterality: N/A;  50 x 5 cm     APPLICATION OF WOUND VACUUM-ASSISTED CLOSURE DEVICE Right 09/27/2022    Procedure: APPLICATION, WOUND VAC;  Surgeon: Kole Tabares MD;  Location: [facility] OR [address] [place] [address];  Service: Plastics;  Laterality: Right;    CARDIAC CATHETERIZATION  12/2012    CARDIAC DEFIBRILLATOR PLACEMENT Left     CRRT-D    CARDIAC VALVE REPLACEMENT  03/08/2018    LVAD Implant    COLONOSCOPY N/A 03/06/2018    Procedure: COLONOSCOPY;  Surgeon: Alonso Bone MD;  Location: [address]);  Service: Endoscopy;  Laterality: N/A;    COLONOSCOPY N/A 07/17/2019    Procedure: COLONOSCOPY;  Surgeon: Blane Valdez MD;  [facility]  Service: Endoscopy;  Laterality: N/A;    COLONOSCOPY N/A 07/18/2019    Procedure: COLONOSCOPY;  Surgeon: Blane Valdez MD;   Location: [facility] ENDO (2ND FLR);  Service: Endoscopy;  Laterality: N/A;    CREATION OF ILIOFEMORAL ARTERY BYPASS Right 09/27/2022    Procedure: CREATION, BYPASS, ARTERIAL, ILIAC TO FEMORAL WITH GRAFT;  Surgeon: Zach Hernández MD;  [facility];  Service: Peripheral Vascular;  Laterality: Right;    CREATION OF MUSCLE ROTATIONAL FLAP Right 09/27/2022    Procedure: CREATION, FLAP, MUSCLE ROTATION, SARTORIUS AND RECTUS FEMORIS;  Surgeon: Kole Tabares MD;  [facility];  Service: Plastics;  Laterality: Right;    ESOPHAGOGASTRODUODENOSCOPY N/A 07/17/2019    Procedure: EGD (ESOPHAGOGASTRODUODENOSCOPY);  Surgeon: Blane Valdez MD;  Location: [facility] ENDO (2ND FLR);  Service: Endoscopy;  Laterality: N/A;    ESOPHAGOGASTRODUODENOSCOPY N/A 07/18/2019    Procedure: EGD (ESOPHAGOGASTRODUODENOSCOPY);  Surgeon: Blane Valdez MD;  Location: [facility] ENDO (2ND FLR);  Service: Endoscopy;  Laterality: N/A;    FOREIGN BODY REMOVAL N/A 07/22/2022    Procedure: REMOVAL, FOREIGN BODY;  Surgeon: Yg Kaufman MD;  [facility];  Service: Cardiovascular;  Laterality: N/A;  LVAD Heartmate 2 drive line removal    INSERTION OF GRAFT TO PERICARDIUM N/A 07/15/2022    Procedure: INSERTION, GRAFT, PERICARDIUM;  Surgeon: Yg Kaufman MD;  [facility];  Service: Cardiovascular;  Laterality: N/A;    IRRIGATION OF MEDIASTINUM N/A 07/15/2022    Procedure: IRRIGATION, MEDIASTINUM;  Surgeon: Yg Kaufman MD;  [facility];  Service: Cardiovascular;  Laterality: N/A;    LYSIS OF ADHESIONS  07/13/2022    Procedure: LYSIS, ADHESIONS;  Surgeon: Yg Kaufman MD;  [facility];  Service: Cardiovascular;;    NONINVASIVE CARDIAC ELECTROPHYSIOLOGY STUDY N/A 10/18/2019    Procedure: CARDIAC ELECTROPHYSIOLOGY STUDY, NONINVASIVE;  Surgeon: Raz Wagner MD;  Location: [facility] EP LAB;  Service: Cardiology;  Laterality: N/A;  VT, DFTs, MDT CRTD in situ, LVAD, LASHELL pizarro, 1516     REPLACEMENT OF IMPLANTABLE CARDIOVERTER-DEFIBRILLATOR (ICD) GENERATOR N/A 03/09/2020    Procedure: REPLACEMENT, ICD GENERATOR;  Surgeon: Harry Yun MD;  Location: [facility] EP LAB;  Service: Cardiology;  Laterality: N/A;  VT, ICD Gen Change and Lead Revision, MDT, MAC, DM,3 Prep    REPLACEMENT OF LEFT VENTRICULAR ASSIST DEVICE (LVAD)  07/13/2022    Procedure: REPLACEMENT, LVAD;  Surgeon: Yg Kaufman MD;  [address]  Service: Cardiovascular;;    REPLACEMENT OF PUMP N/A 07/13/2022    Procedure: REPLACEMENT, PUMP;  Surgeon: Yg Kaufman MD;  [facility];  Service: Cardiovascular;  Laterality: N/A;  LVAD pump exchange  EXPLANATION OF HEATMATE 2  IMPLANTATION OF HEARTMATE 3  IMPLANTATION OF 8MM CHIMNEY GRAFT TO RFA  INITIATION OF ECMO  TEMPORARY CLOSURE OF CHEST    REVISION OF IMPLANTABLE CARDIOVERTER-DEFIBRILLATOR (ICD) ELECTRODE LEAD PLACEMENT N/A 03/09/2020    Procedure: REVISION, INSERTION, ELECTRODE LEAD, ICD;  Surgeon: Harry Yun MD;  Location: [facility] EP LAB;  Service: Cardiology;  Laterality: N/A;  VT, ICD Gen Change and Lead Revision, MDT, MAC, DM,3 Prep    RIGHT HEART CATHETERIZATION Right 09/08/2023    Procedure: INSERTION, CATHETER, RIGHT HEART;  Surgeon: Gaurav Rodriguez MD;  Location: [facility] CATH LAB;  Service: Cardiology;  Laterality: Right;    STERNAL WOUND CLOSURE N/A 07/14/2022    Procedure: CLOSURE, WOUND, STERNUM;  Surgeon: Yg Kaufman MD;  [address]  Service: Cardiovascular;  Laterality: N/A;  temporary closure  evacuation of hematoma    STERNAL WOUND CLOSURE N/A 07/15/2022    Procedure: CLOSURE, WOUND, STERNUM;  Surgeon: Yg Kaufman MD;  [facility];  Service: Cardiovascular;  Laterality: N/A;    TRACHEOSTOMY N/A 08/04/2022    Procedure: CREATION, TRACHEOSTOMY;  Surgeon: Germain Holt MD;  Location: [facility] OR [address] [place] [address];  Service: General;  Laterality: N/A;    TREATMENT OF CARDIAC ARRHYTHMIA  10/18/2019    Procedure:  Cardioversion or Defibrillation;  Surgeon: Raz Wagner MD;  Location: [facility] EP LAB;  Service: Cardiology;;       Review of patient's allergies indicates:   Allergen Reactions    Lisinopril Anaphylaxis    Hydralazine     Hydralazine analogues      Chronic constipation, impotence, dizziness       Current Facility-Administered Medications   Medication    amiodarone 360 mg/200 mL (1.8 mg/mL) infusion    amLODIPine tablet 10 mg    cefadroxil capsule 500 mg    [START ON 2025] levothyroxine tablet 125 mcg    LIDOcaine (cardiac) injection 100 mg    LIDOcaine 2000 mg in D5W 250 mL infusion    magnesium oxide tablet 400 mg    metoprolol succinate (TOPROL-XL) 24 hr split tablet 12.5 mg    mirtazapine tablet 45 mg    mupirocin 2 % ointment    omega-3 acid ethyl esters capsule 2 g    pantoprazole EC tablet 40 mg    warfarin tablet 4 mg     Family History       Problem Relation (Age of Onset)    Cancer Sister (54)    Coronary artery disease Father    Diabetes Father    Heart disease Father    Hypertension Father    No Known Problems Mother, Brother          Tobacco Use    Smoking status: Former     Current packs/day: 0.00     Types: Vaping w/o nicotine, Cigarettes     Start date: 2018     Quit date: 2018     Years since quittin.2     Passive exposure: Never    Smokeless tobacco: Never    Tobacco comments:     pt is quiting on his own - pt stated not qualified for program;  pt  quit on his own   Substance and Sexual Activity    Alcohol use: Never     Comment: quit    Drug use: Never    Sexual activity: Not Currently     Partners: Female     Birth control/protection: None     Comment: 10/5/17  with same partner 7 years      Review of Systems   Constitutional:  Negative for chills and fever.   HENT:  Negative for hearing loss.    Eyes:  Negative for visual disturbance.   Respiratory:  Negative for cough and shortness of breath.    Cardiovascular:  Negative for chest pain and palpitations.    Gastrointestinal:  Negative for abdominal distention, abdominal pain, diarrhea and vomiting.   Genitourinary:  Negative for dysuria.   Musculoskeletal:  Negative for arthralgias.   Neurological:  Negative for dizziness.   Psychiatric/Behavioral:  Negative for confusion.      Objective:     Vital Signs (Most Recent):  Temp: 98.1 °F (36.7 °C) (04/20/25 1153)  Pulse: 110 (04/20/25 1300)  Resp: 16 (04/20/25 1300)  BP: 90/70 (04/20/25 1300)  SpO2: 96 % (04/20/25 1300) Vital Signs (24h Range):  Temp:  [98.1 °F (36.7 °C)-98.6 °F (37 °C)] 98.1 °F (36.7 °C)  Pulse:  [] 110  Resp:  [15-24] 16  SpO2:  [95 %-98 %] 96 %  BP: (68-93)/(0-70) 90/70     Patient Vitals for the past 72 hrs (Last 3 readings):   Weight   04/20/25 1109 109.5 kg (241 lb 6.5 oz)   04/20/25 0800 104.3 kg (230 lb)     Body mass index is 31.85 kg/m².      Intake/Output Summary (Last 24 hours) at 4/20/2025 1658  Last data filed at 4/20/2025 1500  Gross per 24 hour   Intake 238 ml   Output 1350 ml   Net -1112 ml          Physical Exam  Constitutional:       Appearance: Normal appearance.   HENT:      Head: Normocephalic and atraumatic.      Nose: Nose normal.      Mouth/Throat:      Mouth: Mucous membranes are moist.   Eyes:      Pupils: Pupils are equal, round, and reactive to light.   Cardiovascular:      Rate and Rhythm: Normal rate and regular rhythm.      Pulses: Normal pulses.   Pulmonary:      Effort: No respiratory distress.   Abdominal:      General: There is no distension.      Palpations: Abdomen is soft.      Tenderness: There is no abdominal tenderness.   Musculoskeletal:         General: No signs of injury.   Skin:     General: Skin is warm and dry.   Neurological:      General: No focal deficit present.      Mental Status: He is alert and oriented to person, place, and time.   Psychiatric:         Mood and Affect: Mood normal.         Behavior: Behavior normal.            Significant Labs:  CBC:  Recent Labs   Lab 04/20/25  0879  "04/20/25  0833 04/20/25  0941   WBC 6.72  --  3.47*   RBC 4.59*  --  4.08*   HGB 13.2*  --  11.4*   HCT 41.7 43 36.5*     --  188   MCV 91  --  90   MCH 28.8  --  27.9   MCHC 31.7*  --  31.2*     BNP:  Recent Labs   Lab 04/20/25  0827   *     CMP:  Recent Labs   Lab 04/20/25  0827   CALCIUM 8.9   ALBUMIN 3.9      K 3.5   CO2 22*      BUN 24*   CREATININE 2.3*   ALKPHOS 113   ALT 19   AST 38   BILITOT 0.3      Coagulation:   Recent Labs   Lab 04/14/25  1513 04/20/25  0941   INR 2.5* 2.7*   APTT  --  56.4*     LDH:  No results for input(s): "LDH" in the last 72 hours.  Microbiology:  Microbiology Results (last 7 days)       ** No results found for the last 168 hours. **            I have reviewed all pertinent labs within the past 24 hours.          "

## 2025-04-20 NOTE — PLAN OF CARE
Discharge Planning Assessment:    Patient admitted on: 4-20-25  Chart reviewed today  Care plan discussed with ER treatment team and Mr Richards at the bedside.  Current Dispo: on going    Briefly assessed in the E/R this morning  Consults following are: Case Mgt., Electrophysiology , Physical Therapy, Occupational Therapy, Endocrinology, Registered Dietician and Cardiology.  Addiction Psychiatry  ?  Await bed/floor assignment today  Case management to follow tomorrow

## 2025-04-20 NOTE — CONSULTS
[facility] - Cardiac Intensive Care  Endocrinology  Consult Note    Consult Requested by: No att. providers found   Reason for admit: <principal problem not specified>    HISTORY OF PRESENT ILLNESS:  This is a 58 yo man with PMHx Afib/flutter, CHF, dilated DM, ICD placed. He presented to the hospital after ICD discharges x5 at home. He was seen to have VT at the hospital and Endocrinology was consulted d/t concern for amiodarone-induced hyperthyroidism. He is on amiodarone 400 mg PO at home.  His ICD was turned off and he was admitted to CICU.    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 He required a prolonged course of PDN and MMI, then subsequently developed hypothyroidism in 5/2020. He was then hospitalized in the summer of 2022 for a prolonged period of time and TFTs were c/w hyperthyroidism again and he was placed back on steroids and MMI for AIT. Fast resolution of hyperthyroidism with quick taper of steroids and MMI. He was ultimately restarted on low dose levothyroxine at time of hospital discharge at the end of august 2022 with up-titration outpatient based on TFTs.  Most recently seen at our clinic by Delaney HOLT March 2025. His TSH was seen to be elevated and he was instructed to increase LT4 dosing.     He is still on Amiodarine 400 mg daily.     Currently taking:  Generic Levothyroxine 125 mcg daily.   An increase his dose was discussed after last set of labs but he never changed the way he takes his LT4.     He took it appropriately on an empty stomach and waits at least 30 minutes prior to eating.  He does take iron supplements, which he usually takes in the afternoons and at least 4 hours away from levothyroxine.       Medications and/or  Treatments Impacting Glycemic Control:  Immunotherapy:    Immunosuppressants       None          Steroids:   Hormones (From admission, onward)      None          Pressors:    Autonomic Drugs (From admission, onward)      Start     Stop Route Frequency Ordered    04/20/25 2100  metoprolol succinate (TOPROL-XL) 24 hr split tablet 12.5 mg         -- Oral 2 times daily 04/20/25 1208            Prescriptions Prior to Admission[1]    Current Medications[2]    PMH, PSH, FH, SH updated and reviewed     ROS:  Review of Systems   Constitutional: Negative.    HENT: Negative.     Gastrointestinal: Negative.    Endocrine: Negative.    Genitourinary: Negative.    Neurological: Negative.        Labs Reviewed and Include:    Recent Labs   Lab 04/20/25  0827   CALCIUM 8.9   ALBUMIN 3.9      K 3.5   CO2 22*      BUN 24*   CREATININE 2.3*   ALKPHOS 113   ALT 19   AST 38   BILITOT 0.3     Lab Results   Component Value Date    HGBA1C 5.4 04/26/2021       Nutritional status:   Body mass index is 31.85 kg/m².  Lab Results   Component Value Date    ALBUMIN 3.9 04/20/2025    ALBUMIN 3.8 03/26/2025    ALBUMIN 3.6 03/07/2025     Lab Results   Component Value Date    PREALBUMIN 20 03/26/2025    PREALBUMIN 20 03/07/2025    PREALBUMIN 23 11/06/2024       Estimated Creatinine Clearance: 45.4 mL/min (A) (based on SCr of 2.3 mg/dL (H)).    POCT Glucose results:    Current Insulin Regimen:       PHYSICAL EXAMINATION:  Vitals:    04/20/25 1300   BP: 90/70   Pulse: 110   Resp: 16   Temp:      Body mass index is 31.85 kg/m².     Physical Exam  Vitals reviewed.   Constitutional:       General: He is not in acute distress.     Appearance: Normal appearance. He is not ill-appearing.   Eyes:      Extraocular Movements: Extraocular movements intact.      Conjunctiva/sclera: Conjunctivae normal.   Pulmonary:      Effort: Pulmonary effort is normal. No respiratory distress.   Musculoskeletal:      Right lower leg: No edema.      Left lower leg: No  edema.   Skin:     General: Skin is warm and dry.   Neurological:      General: No focal deficit present.      Mental Status: He is alert and oriented to person, place, and time.   Psychiatric:         Mood and Affect: Mood normal.         Behavior: Behavior normal.         Thought Content: Thought content normal.        .     ASSESSMENT and PLAN:    Cardiac/Vascular  Ventricular tachycardia  Received multiple ICD shocks today.  Given this is not hyperthyroidism, no concern for amiodarone acutely worsening thyroid function and therefore impacting heart rhythm.  Managed by cardiology.        Other  amiodarone induced hypothyrodism  Previously amio induced thyrotoxicosis, since 2022 has been treated as amio induced hypothyroidism.  His latest TSH is slightly above ULN, which is very reassuring that he is not being overtreated for AIH. T4 free is wnl.  He has no significant thyroid-related symptoms and takes LT4 appropriately every day.  The abnormal discharges from ICD were not related to his thyroid function as he is if anything a little hypothyroid - though with his risk profile and normal FT4 we are satisfied with current lab results.    -Continue levothyroxine 125 mcg daily  -Recheck TSH and FT4 in 3-4 weeks   -OK to continue amio from our standpoint      Endocrine will sign off. Please call with further questions.    Anna Lazar MD  Endocrinology  [facility] - [facility]        [1]   Medications Prior to Admission   Medication Sig Dispense Refill Last Dose/Taking    amiodarone (PACERONE) 200 MG Tab Take 2 tablets (400 mg total) by mouth once daily. 180 tablet 3     amLODIPine (NORVASC) 10 MG tablet Take 1 tablet (10 mg total) by mouth once daily. 90 tablet 3     cefadroxil (DURICEF) 500 MG Cap Take 1 capsule (500 mg total) by mouth every 12 (twelve) hours. 60 capsule 4     cyanocobalamin 1,000 mcg/mL injection INJECT 1 ML (1,000 MCG TOTAL) INTO THE SKIN ONCE A WEEK. FOR 24 DOSES 36 mL 1      doxycycline (VIBRAMYCIN) 100 MG Cap Take 1 capsule (100 mg total) by mouth 2 (two) times daily. 60 capsule 0     ferrous gluconate 324 mg (37.5 mg iron) Tab tablet TAKE 1 TABLET BY MOUTH 2 TIMES DAILY WITH MEALS. 180 tablet 3     levothyroxine (SYNTHROID) 125 MCG tablet Take 1 tablet (125 mcg total) by mouth before breakfast. 30 tablet 2     magnesium oxide (MAG-OX) 400 mg (241.3 mg magnesium) tablet Take 1 tablet (400 mg total) by mouth once daily. 90 tablet 3     metoprolol succinate (TOPROL-XL) 25 MG 24 hr tablet Take 0.5 tablets (12.5 mg total) by mouth 2 (two) times daily. 90 tablet 3     mirtazapine (REMERON) 45 MG tablet Take 1 tablet (45 mg total) by mouth every evening. 90 tablet 1     omega-3 acid ethyl esters (LOVAZA) 1 gram capsule Take 2 capsules (2 g total) by mouth 2 (two) times daily. 360 capsule 3     pantoprazole (PROTONIX) 40 MG tablet Take 1 tablet (40 mg total) by mouth daily with lunch. 90 tablet 3     potassium chloride SA (K-DUR,KLOR-CON) 20 MEQ tablet TAKE 1 TABLET WITH EVERY LASIX 40 MG DOSE 36 tablet 9     torsemide (DEMADEX) 20 MG Tab Take 1 tablets (20mg) on Mondays. 90 tablet 3     warfarin (COUMADIN) 5 MG tablet Take 1-1.5 tablets (5-7.5 mg total) by mouth Daily. As directed by the [facility] 135 tablet 3     zolpidem (AMBIEN CR) 12.5 MG CR tablet TAKE 1 TABLET (12.5 MG TOTAL) BY MOUTH NIGHTLY AS NEEDED FOR INSOMNIA. 30 tablet 5    [2]   Current Facility-Administered Medications   Medication Dose Route Frequency Provider Last Rate Last Admin    amiodarone 360 mg/200 mL (1.8 mg/mL) infusion  1 mg/min Intravenous Continuous Dayton Gomez MD   Stopped at 04/20/25 0943    amiodarone 360 mg/200 mL (1.8 mg/mL) infusion  0.5 mg/min Intravenous Continuous Dayton Gomez MD        amLODIPine tablet 10 mg  10 mg Oral Daily Dayton Gomez MD   10 mg at 04/20/25 1219    cefadroxil capsule 500 mg  500 mg Oral Q12H Dayton Gomez MD        [START ON 4/21/2025] levothyroxine tablet  125 mcg  125 mcg Oral Before breakfast Dayton Gomez MD        LIDOcaine (cardiac) injection 100 mg  100 mg Intravenous Once Dayton Gomez MD        LIDOcaine 2000 mg in D5W 250 mL infusion  1 mg/min Intravenous Continuous Dayton Gomez MD        magnesium oxide tablet 400 mg  400 mg Oral Daily Dayton Gomez MD   400 mg at 04/20/25 1219    metoprolol succinate (TOPROL-XL) 24 hr split tablet 12.5 mg  12.5 mg Oral BID Dayton Gomez MD        mirtazapine tablet 45 mg  45 mg Oral QHS Dayton Gomez MD        mupirocin 2 % ointment   Nasal BID Dayton Gomez MD   Given at 04/20/25 1219    omega-3 acid ethyl esters capsule 2 g  2 g Oral BID Dayton Gomez MD        pantoprazole EC tablet 40 mg  40 mg Oral Daily with lunch Dayton Gomez MD   40 mg at 04/20/25 1315    warfarin tablet 4 mg  4 mg Oral Daily Dayton Gomez MD

## 2025-04-21 ENCOUNTER — DOCUMENTATION ONLY (OUTPATIENT)
Dept: CARDIOLOGY | Facility: HOSPITAL | Age: 60
End: 2025-04-21
Payer: MEDICARE

## 2025-04-21 LAB
ABSOLUTE EOSINOPHIL (OHS): 0.06 K/UL
ABSOLUTE MONOCYTE (OHS): 0.55 K/UL (ref 0.3–1)
ABSOLUTE NEUTROPHIL COUNT (OHS): 2.53 K/UL (ref 1.8–7.7)
ALBUMIN SERPL BCP-MCNC: 3.2 G/DL (ref 3.5–5.2)
ALP SERPL-CCNC: 92 UNIT/L (ref 40–150)
ALT SERPL W/O P-5'-P-CCNC: 14 UNIT/L (ref 10–44)
ANION GAP (OHS): 12 MMOL/L (ref 8–16)
ANION GAP (OHS): 7 MMOL/L (ref 8–16)
APTT PPP: 58.3 SECONDS (ref 21–32)
AST SERPL-CCNC: 29 UNIT/L (ref 11–45)
BASOPHILS # BLD AUTO: 0.01 K/UL
BASOPHILS NFR BLD AUTO: 0.2 %
BILIRUB DIRECT SERPL-MCNC: 0.1 MG/DL (ref 0.1–0.3)
BILIRUB SERPL-MCNC: 0.3 MG/DL (ref 0.1–1)
BUN SERPL-MCNC: 23 MG/DL (ref 6–20)
BUN SERPL-MCNC: 23 MG/DL (ref 6–20)
CALCIUM SERPL-MCNC: 9.1 MG/DL (ref 8.7–10.5)
CALCIUM SERPL-MCNC: 9.1 MG/DL (ref 8.7–10.5)
CHLORIDE SERPL-SCNC: 101 MMOL/L (ref 95–110)
CHLORIDE SERPL-SCNC: 101 MMOL/L (ref 95–110)
CO2 SERPL-SCNC: 24 MMOL/L (ref 23–29)
CO2 SERPL-SCNC: 29 MMOL/L (ref 23–29)
CREAT SERPL-MCNC: 1.9 MG/DL (ref 0.5–1.4)
CREAT SERPL-MCNC: 2 MG/DL (ref 0.5–1.4)
CRP SERPL-MCNC: 16.2 MG/L
ERYTHROCYTE [DISTWIDTH] IN BLOOD BY AUTOMATED COUNT: 15 % (ref 11.5–14.5)
GFR SERPLBLD CREATININE-BSD FMLA CKD-EPI: 38 ML/MIN/1.73/M2
GFR SERPLBLD CREATININE-BSD FMLA CKD-EPI: 40 ML/MIN/1.73/M2
GLUCOSE SERPL-MCNC: 109 MG/DL (ref 70–110)
GLUCOSE SERPL-MCNC: 116 MG/DL (ref 70–110)
HCT VFR BLD AUTO: 40.2 % (ref 40–54)
HGB BLD-MCNC: 12.4 GM/DL (ref 14–18)
HIV 1 & 2 AB SER-IMP: NORMAL
HIV 2 AB SERPLBLD QL IA.RAPID: NEGATIVE
HIV1 AB SERPLBLD QL IA.RAPID: NEGATIVE
IMM GRANULOCYTES # BLD AUTO: 0.01 K/UL (ref 0–0.04)
IMM GRANULOCYTES NFR BLD AUTO: 0.2 % (ref 0–0.5)
INR PPP: 3.4 (ref 0.8–1.2)
LDH SERPL-CCNC: 231 U/L (ref 110–260)
LYMPHOCYTES # BLD AUTO: 1.1 K/UL (ref 1–4.8)
MAGNESIUM SERPL-MCNC: 2.4 MG/DL (ref 1.6–2.6)
MCH RBC QN AUTO: 27.7 PG (ref 27–31)
MCHC RBC AUTO-ENTMCNC: 30.8 G/DL (ref 32–36)
MCV RBC AUTO: 90 FL (ref 82–98)
NUCLEATED RBC (/100WBC) (OHS): 0 /100 WBC
PHOSPHATE SERPL-MCNC: 3 MG/DL (ref 2.7–4.5)
PLATELET # BLD AUTO: 223 K/UL (ref 150–450)
PMV BLD AUTO: 9.9 FL (ref 9.2–12.9)
POTASSIUM SERPL-SCNC: 4 MMOL/L (ref 3.5–5.1)
POTASSIUM SERPL-SCNC: 4.8 MMOL/L (ref 3.5–5.1)
PREALB SERPL-MCNC: 22 MG/DL (ref 20–43)
PROT SERPL-MCNC: 7.7 GM/DL (ref 6–8.4)
PROTHROMBIN TIME: 33.9 SECONDS (ref 9–12.5)
RBC # BLD AUTO: 4.48 M/UL (ref 4.6–6.2)
RELATIVE EOSINOPHIL (OHS): 1.4 %
RELATIVE LYMPHOCYTE (OHS): 25.8 % (ref 18–48)
RELATIVE MONOCYTE (OHS): 12.9 % (ref 4–15)
RELATIVE NEUTROPHIL (OHS): 59.5 % (ref 38–73)
SODIUM SERPL-SCNC: 137 MMOL/L (ref 136–145)
SODIUM SERPL-SCNC: 137 MMOL/L (ref 136–145)
WBC # BLD AUTO: 4.26 K/UL (ref 3.9–12.7)

## 2025-04-21 PROCEDURE — 25000003 PHARM REV CODE 250: Performed by: STUDENT IN AN ORGANIZED HEALTH CARE EDUCATION/TRAINING PROGRAM

## 2025-04-21 PROCEDURE — 63600175 PHARM REV CODE 636 W HCPCS

## 2025-04-21 PROCEDURE — 86140 C-REACTIVE PROTEIN: CPT | Performed by: STUDENT IN AN ORGANIZED HEALTH CARE EDUCATION/TRAINING PROGRAM

## 2025-04-21 PROCEDURE — 99291 CRITICAL CARE FIRST HOUR: CPT | Mod: ICN,,, | Performed by: INTERNAL MEDICINE

## 2025-04-21 PROCEDURE — 85025 COMPLETE CBC W/AUTO DIFF WBC: CPT | Performed by: STUDENT IN AN ORGANIZED HEALTH CARE EDUCATION/TRAINING PROGRAM

## 2025-04-21 PROCEDURE — 94761 N-INVAS EAR/PLS OXIMETRY MLT: CPT

## 2025-04-21 PROCEDURE — 87536 HIV-1 QUANT&REVRSE TRNSCRPJ: CPT | Performed by: STUDENT IN AN ORGANIZED HEALTH CARE EDUCATION/TRAINING PROGRAM

## 2025-04-21 PROCEDURE — 84100 ASSAY OF PHOSPHORUS: CPT | Performed by: STUDENT IN AN ORGANIZED HEALTH CARE EDUCATION/TRAINING PROGRAM

## 2025-04-21 PROCEDURE — 84134 ASSAY OF PREALBUMIN: CPT | Performed by: STUDENT IN AN ORGANIZED HEALTH CARE EDUCATION/TRAINING PROGRAM

## 2025-04-21 PROCEDURE — 82248 BILIRUBIN DIRECT: CPT | Performed by: STUDENT IN AN ORGANIZED HEALTH CARE EDUCATION/TRAINING PROGRAM

## 2025-04-21 PROCEDURE — 83615 LACTATE (LD) (LDH) ENZYME: CPT | Performed by: STUDENT IN AN ORGANIZED HEALTH CARE EDUCATION/TRAINING PROGRAM

## 2025-04-21 PROCEDURE — 85730 THROMBOPLASTIN TIME PARTIAL: CPT | Performed by: STUDENT IN AN ORGANIZED HEALTH CARE EDUCATION/TRAINING PROGRAM

## 2025-04-21 PROCEDURE — 80176 ASSAY OF LIDOCAINE: CPT | Performed by: STUDENT IN AN ORGANIZED HEALTH CARE EDUCATION/TRAINING PROGRAM

## 2025-04-21 PROCEDURE — 99900035 HC TECH TIME PER 15 MIN (STAT)

## 2025-04-21 PROCEDURE — 87070 CULTURE OTHR SPECIMN AEROBIC: CPT | Performed by: PHYSICIAN ASSISTANT

## 2025-04-21 PROCEDURE — 63600175 PHARM REV CODE 636 W HCPCS: Performed by: STUDENT IN AN ORGANIZED HEALTH CARE EDUCATION/TRAINING PROGRAM

## 2025-04-21 PROCEDURE — 27000248 HC VAD-ADDITIONAL DAY

## 2025-04-21 PROCEDURE — 20000000 HC ICU ROOM

## 2025-04-21 PROCEDURE — 25000003 PHARM REV CODE 250

## 2025-04-21 PROCEDURE — 83735 ASSAY OF MAGNESIUM: CPT | Performed by: STUDENT IN AN ORGANIZED HEALTH CARE EDUCATION/TRAINING PROGRAM

## 2025-04-21 PROCEDURE — 85610 PROTHROMBIN TIME: CPT | Performed by: STUDENT IN AN ORGANIZED HEALTH CARE EDUCATION/TRAINING PROGRAM

## 2025-04-21 PROCEDURE — 80053 COMPREHEN METABOLIC PANEL: CPT | Performed by: STUDENT IN AN ORGANIZED HEALTH CARE EDUCATION/TRAINING PROGRAM

## 2025-04-21 PROCEDURE — 99223 1ST HOSP IP/OBS HIGH 75: CPT | Mod: ICN,,, | Performed by: INTERNAL MEDICINE

## 2025-04-21 PROCEDURE — 25000003 PHARM REV CODE 250: Performed by: INTERNAL MEDICINE

## 2025-04-21 RX ORDER — WARFARIN 1 MG/1
2 TABLET ORAL DAILY
Status: DISCONTINUED | OUTPATIENT
Start: 2025-04-21 | End: 2025-04-22

## 2025-04-21 RX ORDER — LORAZEPAM 0.5 MG/1
0.5 TABLET ORAL EVERY 4 HOURS PRN
Status: DISCONTINUED | OUTPATIENT
Start: 2025-04-21 | End: 2025-04-22

## 2025-04-21 RX ORDER — TALC
6 POWDER (GRAM) TOPICAL NIGHTLY PRN
Status: DISCONTINUED | OUTPATIENT
Start: 2025-04-21 | End: 2025-04-23 | Stop reason: HOSPADM

## 2025-04-21 RX ORDER — AMIODARONE HYDROCHLORIDE 150 MG/3ML
300 INJECTION, SOLUTION INTRAVENOUS ONCE
Status: COMPLETED | OUTPATIENT
Start: 2025-04-21 | End: 2025-04-21

## 2025-04-21 RX ORDER — LIDOCAINE HYDROCHLORIDE 10 MG/ML
INJECTION, SOLUTION INFILTRATION; PERINEURAL
Status: DISPENSED
Start: 2025-04-21 | End: 2025-04-21

## 2025-04-21 RX ORDER — OXYCODONE AND ACETAMINOPHEN 5; 325 MG/1; MG/1
1 TABLET ORAL EVERY 6 HOURS PRN
Status: DISPENSED | OUTPATIENT
Start: 2025-04-21 | End: 2025-04-23

## 2025-04-21 RX ORDER — LIDOCAINE HYDROCHLORIDE 20 MG/ML
100 INJECTION INTRAVENOUS ONCE
Status: COMPLETED | OUTPATIENT
Start: 2025-04-21 | End: 2025-04-21

## 2025-04-21 RX ORDER — LORAZEPAM 2 MG/ML
0.5 INJECTION INTRAMUSCULAR
Status: DISCONTINUED | OUTPATIENT
Start: 2025-04-21 | End: 2025-04-22

## 2025-04-21 RX ORDER — MIDAZOLAM HYDROCHLORIDE 1 MG/ML
2 INJECTION, SOLUTION INTRAMUSCULAR; INTRAVENOUS ONCE
Status: COMPLETED | OUTPATIENT
Start: 2025-04-21 | End: 2025-04-21

## 2025-04-21 RX ORDER — FUROSEMIDE 10 MG/ML
40 INJECTION INTRAMUSCULAR; INTRAVENOUS EVERY 4 HOURS
Status: COMPLETED | OUTPATIENT
Start: 2025-04-21 | End: 2025-04-21

## 2025-04-21 RX ORDER — METHOCARBAMOL 500 MG/1
500 TABLET, FILM COATED ORAL ONCE
Status: COMPLETED | OUTPATIENT
Start: 2025-04-21 | End: 2025-04-21

## 2025-04-21 RX ORDER — MIDAZOLAM HYDROCHLORIDE 1 MG/ML
INJECTION, SOLUTION INTRAMUSCULAR; INTRAVENOUS
Status: COMPLETED
Start: 2025-04-21 | End: 2025-04-21

## 2025-04-21 RX ORDER — AMIODARONE HYDROCHLORIDE 200 MG/1
400 TABLET ORAL 2 TIMES DAILY
Status: DISCONTINUED | OUTPATIENT
Start: 2025-04-21 | End: 2025-04-23 | Stop reason: HOSPADM

## 2025-04-21 RX ADMIN — METHOCARBAMOL 500 MG: 500 TABLET ORAL at 05:04

## 2025-04-21 RX ADMIN — MIDAZOLAM 2 MG: 1 INJECTION INTRAMUSCULAR; INTRAVENOUS at 06:04

## 2025-04-21 RX ADMIN — MUPIROCIN: 20 OINTMENT TOPICAL at 08:04

## 2025-04-21 RX ADMIN — PANTOPRAZOLE SODIUM 40 MG: 40 TABLET, DELAYED RELEASE ORAL at 11:04

## 2025-04-21 RX ADMIN — Medication 6 MG: at 08:04

## 2025-04-21 RX ADMIN — LORAZEPAM 0.5 MG: 0.5 TABLET ORAL at 08:04

## 2025-04-21 RX ADMIN — AMLODIPINE BESYLATE 10 MG: 10 TABLET ORAL at 08:04

## 2025-04-21 RX ADMIN — CEFADROXIL 500 MG: 500 CAPSULE ORAL at 08:04

## 2025-04-21 RX ADMIN — OMEGA-3-ACID ETHYL ESTERS 2 G: 1 CAPSULE, LIQUID FILLED ORAL at 10:04

## 2025-04-21 RX ADMIN — OMEGA-3-ACID ETHYL ESTERS 2 G: 1 CAPSULE, LIQUID FILLED ORAL at 08:04

## 2025-04-21 RX ADMIN — FUROSEMIDE 40 MG: 10 INJECTION, SOLUTION INTRAVENOUS at 08:04

## 2025-04-21 RX ADMIN — AMIODARONE HYDROCHLORIDE 300 MG: 50 INJECTION, SOLUTION INTRAVENOUS at 06:04

## 2025-04-21 RX ADMIN — LEVOTHYROXINE SODIUM 125 MCG: 25 TABLET ORAL at 05:04

## 2025-04-21 RX ADMIN — AMIODARONE HYDROCHLORIDE 400 MG: 200 TABLET ORAL at 08:04

## 2025-04-21 RX ADMIN — METOPROLOL SUCCINATE 12.5 MG: 25 TABLET, EXTENDED RELEASE ORAL at 08:04

## 2025-04-21 RX ADMIN — MIRTAZAPINE 45 MG: 15 TABLET, FILM COATED ORAL at 08:04

## 2025-04-21 RX ADMIN — Medication 400 MG: at 08:04

## 2025-04-21 RX ADMIN — LIDOCAINE HYDROCHLORIDE 1 MG/MIN: 8 INJECTION, SOLUTION INTRAVENOUS at 05:04

## 2025-04-21 RX ADMIN — LIDOCAINE HYDROCHLORIDE 100 MG: 20 INJECTION INTRAVENOUS at 06:04

## 2025-04-21 RX ADMIN — MIDAZOLAM HYDROCHLORIDE 2 MG: 1 INJECTION, SOLUTION INTRAMUSCULAR; INTRAVENOUS at 06:04

## 2025-04-21 RX ADMIN — WARFARIN SODIUM 2 MG: 1 TABLET ORAL at 06:04

## 2025-04-21 RX ADMIN — OXYCODONE HYDROCHLORIDE AND ACETAMINOPHEN 1 TABLET: 5; 325 TABLET ORAL at 06:04

## 2025-04-21 RX ADMIN — FUROSEMIDE 40 MG: 10 INJECTION, SOLUTION INTRAVENOUS at 10:04

## 2025-04-21 NOTE — PROGRESS NOTES
Admit Note     Met with patient and pt's spouse, Kelly, to assess needs. Patient is a 59 y.o.  male, admitted for VT, VT, incessant, and LVAD present. Pt received LVAD in 2018 and pump (HM3) exchange in 2022. Pt does his own dressing changes.    Patient admitted on 4/20/2025. At this time, patient presents as alert and oriented x 4, communicative, and cooperative. At this time, patients caregiver/spouse presents as alert and oriented x 4, communicative, and asking and answering questions appropriately.    Household/Family Systems     Patient resides with his spouse. Support system includes spouse. Pt states he has no other support aside from his spouse. Pt has 3 adults children that resides out-of-state. Patient does not have dependents that are need of being cared for.    Pt's home address: [facility] [place] [address]    Pt's phone number: [phone]     Patients primary caregiver is self with support of his spouse. Pt's spouse works full-time as an acct mgr. Confirmed patients emergency contact information as follows:     Kelly Richards, spouse, [phone]    During admission, patient's caregiver plans to stay at home. Confirmed patient and patients caregivers do have access to reliable transportation.    Cognitive Status/Learning     Patient reports reading ability as college and states he does not have difficulty with reading, writing, hearing, and learning. Pt reports he does have difficulty with memory. Pt states that he wears eyeglasses due to myopia. Patient reports he learns best by reading.   Needed: No.   Highest education level: Attended College/Technical School    Vocation/Disability     Working for Income: No  If no, reason not working: Disability  Patient is disabled due to HF since 2020.  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    Adherence     Patient reports a low level of  adherence to his health care regimen. Pt's spouse reports that pt does not adhere to his diet and substance (alcohol) use. Adherence counseling and education provided. Patient verbalizes understanding.     Substance Use    Patient reports the following substance usage.    Tobacco:  Pt denies tobacco use at present . Pt states that he quit smoking cigarettes 6 years ago.  Alcohol:  Pt reports heavy alcohol use pta with last drink on 4/19/25. Pt reports that he struggles with alcohol and his spouse his concerns about his usage. Inquired if pt used the substance abuse tx ctr list provided during last admit. Pt reports he spoke with his psychiatrist and discussed programs but he has not reached out to any tx ctrs. Inquired if addiction psych met with pt last admit. Pt reports that addiction psych did meet with him, but pt's spouse denied meeting. Pt inquired about aversion therapy. Advised pt to discuss aversion therapy with addiction psych. Pt was amenable to addiction psych consult. Requested Dr. Gomez to consult addiction psych.  Illicit Drugs/Non-prescribed Medications: none, patient denies any use.  Patient states clear understanding of the potential impact of substance use.  Substance abstinence/cessation counseling, education and resources provided and reviewed.     Services Utilizing/ADLS    Infusion Service: Prior to admission, patient utilizing? no [facility] ([phone], [phone]/F) in the past   Home Health: Prior to admission, patient utilizing? no [facility] ([phone]) in the past. Pt also states that he went to [facility] for PT ([phone], [phone]/F) in the past   DME: Prior to admission, yes Pt has a RW, [employer]rycane, and SC at home.  Pulmonary/Cardiac Rehab: Prior to admission, no  Dialysis:  Prior to admission, no  Transplant Specialty Pharmacy:  Prior to admission, no.    Prior to admission, patient reports he was independent with ADLS and was driving. Pt ambulates without assist and reports a  fall last month. Patient reports he is able to care for self at this time. Patient indicates a willingness to care for self once medically cleared to do so.    Insurance/Medications    Insured by   Payer/Plan Subscr  Sex Relation Sub. Ins. ID Effective Group Num   1. OHP MEDICARE * JESUS PHELPS* 1965 Male Self [id number]                                   [address]      Primary Insurance (for UNOS reporting): Public Insurance - Medicare & Choice  Secondary Insurance (for UNOS reporting): None    Patient reports he is able to obtain and afford medications at this time and at time of discharge.    Living Will/Healthcare Power of     Pt states that his spouse is his HCPA. Copy of HCPA on file in Epic.    Coping/Mental Health    Patient is coping adequately with the aid of  family members and engaging in hobbies: watching TV and working on his car. Patient indicates mental health difficulties. Pt reports h/o anxiety. Pt reports that he no longer takes Xanax and was prescribed Remeron. Pt reports he has underlying issues that contributes to his substance use. Pt reports that he drinks to forget about his problems. Pt's spouse reports that she discussed marriage counseling with pt. Empathetic listening and general support provided. Pt was amenable to receive Marriage and Family Counseling List. SW provided pt with above list. Pt reports he also f/u with Dr. Mejia, Psych, with last visit a month ago. Pt denies SI and/or HI at present and in the past.    Discharge Planning    At time of discharge, patient plans to return to his home under the care of self and his spouse. Patients spouse will transport patient. Per rounds today, expected discharge date has not been medically determined at this time. Patient verbalizes understanding and are involved in treatment planning and discharge process.    Additional Concerns    Patient's caretaker denies additional needs and/or concerns at this  time. Patient is being followed for needs, education, resources, information, emotional support, supportive counseling, and for supportive and skilled discharge plan of care.  providing ongoing psychosocial support, education, resources and d/c planning as needed.  SW remains available.  provided resource list, patient choice, psychosocial and supportive counseling, resources, education, assistance and discharge planning with patient and caregiver involvement, ongoing SW availability and services as appropriate. Patient's caregiver verbalizes understanding and agreement with information reviewed,  availability and how to access available resources as needed. Patient denies additional needs and/or concerns at this time. Patient verbalizes understanding and agreement with information reviewed, social work availability, and how to access available resources as needed.

## 2025-04-21 NOTE — PLAN OF CARE
Continue your previously prescribed medications    One of your cardiac enzymes is slightly elevated if you develop any chest pain please return to the ER immediately.    Your CT scan showed an incidental finding of a pulmonary nodule please follow-up with your doctor for further imaging.    Follow-up with your primary care doctor in 1 to 2 days    Return to the ER for any worsening or concerning symptoms     Patient continues to have persistent episodes of ventricular tachycardia. MD aware and at bedside with patient. Patient remains connected to monitor and zoll. No new orders at this time

## 2025-04-21 NOTE — PROGRESS NOTES
[facility] - Cardiac Intensive Care  Heart Transplant  Progress Note    Patient Name: Tim Richards  MRN: [id number]  Admission Date: 4/20/2025  Hospital Length of Stay: 1 days  Attending Physician: Amy Rhodes MD  Primary Care Provider: Diego Daniel MD  Principal Problem:Ventricular tachycardia    Subjective:   Interval History: Overnight patient had multiple VT recurrences and was shocked the first time. He was also bolused amiodarone and versed and started on lidocaine drip. He has not had any further recurrence. Otherwise he feels fine. HIV screen was positive, second test was negative. PCR pending. Ethanol level 80.    Continuous Infusions:   amiodarone in dextrose 5%  0.5 mg/min Intravenous Continuous        LIDOcaine  3 mg/min Intravenous Continuous 22.5 mL/hr at 04/21/25 1005 3 mg/min at 04/21/25 1005     Scheduled Meds:   amLODIPine  10 mg Oral Daily    cefadroxil  500 mg Oral Q12H    levothyroxine  125 mcg Oral Before breakfast    LIDOcaine (cardiac)  100 mg Intravenous Once    LIDOcaine HCL 10 mg/ml (1%)        magnesium oxide  400 mg Oral Daily    metoprolol succinate  12.5 mg Oral BID    mirtazapine  45 mg Oral QHS    mupirocin   Nasal BID    omega-3 acid ethyl esters  2 g Oral BID    pantoprazole  40 mg Oral Daily with lunch    warfarin  2 mg Oral Daily     PRN Meds:  Current Facility-Administered Medications:     LIDOcaine HCL 10 mg/ml (1%), , ,     lorazepam, 0.5 mg, Intravenous, Q15 Min PRN    LORazepam, 0.5 mg, Oral, Q4H PRN    oxyCODONE-acetaminophen, 1 tablet, Oral, Q6H PRN    Review of patient's allergies indicates:   Allergen Reactions    Lisinopril Anaphylaxis    Hydralazine     Hydralazine analogues      Chronic constipation, impotence, dizziness     Objective:     Vital Signs (Most Recent):  Temp: 98.2 °F (36.8 °C) (04/21/25 0705)  Pulse: 61 (04/21/25 1005)  Resp: 16 (04/21/25 1005)  BP: (!) 84/0 (04/21/25 0617)  SpO2: 99 % (04/21/25 1005) Vital Signs (24h Range):  Temp:  [98.1 °F  (36.7 °C)-98.3 °F (36.8 °C)] 98.2 °F (36.8 °C)  Pulse:  [] 61  Resp:  [8-59] 16  SpO2:  [82 %-100 %] 99 %  BP: ()/(0-84) 84/0     Patient Vitals for the past 72 hrs (Last 3 readings):   Weight   04/21/25 0905 109 kg (240 lb 4.8 oz)   04/20/25 1109 109.5 kg (241 lb 6.5 oz)   04/20/25 0800 104.3 kg (230 lb)     Body mass index is 31.7 kg/m².      Intake/Output Summary (Last 24 hours) at 4/21/2025 1202  Last data filed at 4/21/2025 1005  Gross per 24 hour   Intake 1668.44 ml   Output 1900 ml   Net -231.56 ml                Physical Exam  Constitutional:       Appearance: Normal appearance.   HENT:      Head: Normocephalic and atraumatic.      Nose: Nose normal.      Mouth/Throat:      Mouth: Mucous membranes are moist.   Eyes:      Pupils: Pupils are equal, round, and reactive to light.   Cardiovascular:      Rate and Rhythm: Normal rate and regular rhythm.      Pulses: Normal pulses.   Pulmonary:      Effort: No respiratory distress.   Abdominal:      General: There is no distension.      Palpations: Abdomen is soft.      Tenderness: There is no abdominal tenderness.   Musculoskeletal:         General: No signs of injury.   Skin:     General: Skin is warm and dry.   Neurological:      General: No focal deficit present.      Mental Status: He is alert and oriented to person, place, and time.   Psychiatric:         Mood and Affect: Mood normal.         Behavior: Behavior normal.            Significant Labs:  CBC:  Recent Labs   Lab 04/20/25  0827 04/20/25  0833 04/20/25  0941 04/21/25  0305   WBC 6.72  --  3.47* 4.26   RBC 4.59*  --  4.08* 4.48*   HGB 13.2*  --  11.4* 12.4*   HCT 41.7 43 36.5* 40.2     --  188 223   MCV 91  --  90 90   MCH 28.8  --  27.9 27.7   MCHC 31.7*  --  31.2* 30.8*     BNP:  Recent Labs   Lab 04/20/25  0827   *     CMP:  Recent Labs   Lab 04/20/25  0827 04/20/25  1656 04/21/25  0305   CALCIUM 8.9 8.7 9.1   ALBUMIN 3.9 3.3* 3.2*    136 137   K 3.5 4.3 4.0   CO2 22*  25 24    100 101   BUN 24* 23* 23*   CREATININE 2.3* 2.1* 1.9*   ALKPHOS 113  --  92   ALT 19  --  14   AST 38  --  29   BILITOT 0.3  --  0.3      Coagulation:   Recent Labs   Lab 04/14/25  1513 04/20/25  0941 04/21/25  0305   INR 2.5* 2.7* 3.4*   APTT  --  56.4* 58.3*     LDH:  Recent Labs   Lab 04/21/25  0305        Microbiology:  Microbiology Results (last 7 days)       Procedure Component Value Units Date/Time    Aerobic culture [[id number]] Collected: 04/21/25 0942    Order Status: Sent Specimen: Wound from Abdomen             I have reviewed all pertinent labs within the past 24 hours.    Estimated Creatinine Clearance: 54.8 mL/min (A) (based on SCr of 1.9 mg/dL (H)).      Assessment and Plan:     58 yo black male with stage D HFrEF s/p HM2 (implanted 3/8/2018 as DT-VAD) but required pump exchange (HM3 pump exchange 7/13/2022) who presents to the ED after getting shocked 17 times by his device. In the ED he was in VT and was started on amiodarone. When he was receiving IV metoprolol his VT rate went from 130 to 180 to 240 to VF and then he was shocked. He was awake and otherwise felt fine. He is not sure what may have caused this; he has been compliant with medications. He was recently switched from doxycycline to cefadroxil and 2 weeks ago was taken off of xanax.    He has a history of VT and is currently on amiodarone and mexiletine and metoprolol. He also has a history of amiodarone induced thyrotoxicosis. TSH today 4.7. He has a history of alcohol abuse but reports he has been sober for a few months. iSTAT labs showed K 3.5. He is volume overloaded on exam.    * Ventricular tachycardia  Now on lidocaine drip  Counseled on alcohol cessation  S-icd shock turned off for now    amiodarone induced hypothyrodism  Endocrinology consulted appreciate recs. Given TSH 4.7 he is essentially euthyroid and unlikely to contribute to arrhythmia  -Continue levothyroxine 125 mcg daily  -Recheck TSH and FT4 in  3-4 weeks   -OK to continue amio from Endocrinology standpoint    LVAD (left ventricular assist device) present  Procedure: Device Interrogation Including analysis of device parameters  Current Settings: Ventricular Assist Device  Review of device function is stable/unstable stable        4/21/2025    10:05 AM 4/21/2025     9:05 AM 4/21/2025     8:05 AM 4/21/2025     7:05 AM 4/21/2025     6:01 AM 4/21/2025     5:01 AM 4/21/2025     4:01 AM   TXP LVAD INTERROGATIONS   Type HeartMate3 HeartMate3 HeartMate3 HeartMate3 HeartMate3 HeartMate3 HeartMate3   Flow 5.9 5.9 5.9 5.9 5.8 5.8 5.8   Speed 6300 6300 6300 6300 6300 6300 6300   PI 1.7 1.8 1.7 1.6 4.6 1.6 1.5   Power (Jackson) 5.6 5.5 5.7 5.5 5.6 5.6 5.5   LSL 5900 5900 5900 5900 5900 5900 5900   Pulsatility Intermittent pulse Intermittent pulse Intermittent pulse Intermittent pulse Intermittent pulse Intermittent pulse Intermittent pulse             Dayton Gomez MD  Heart Transplant  [facility] - Cardiac Intensive Care

## 2025-04-21 NOTE — PT/OT/SLP PROGRESS
Occupational Therapy      Patient Name:  Tim Richards   MRN:  [id number]    Orders received and acknowledged. Patient not seen today secondary to RN hold 2* multiple instances of v-tach this morning. Will follow-up 4/22 or as medically ready.    4/21/2025

## 2025-04-21 NOTE — SUBJECTIVE & OBJECTIVE
Interval History: Since overnight admission required 1 external shock. Subq ICD tachytherapies turned off. On lidocaine 3gtt.     ROS12 point ROS negative except for HPI.   Objective:     Vital Signs (Most Recent):  Temp: 98.2 °F (36.8 °C) (04/21/25 1105)  Pulse: (!) 55 (04/21/25 1305)  Resp: 20 (04/21/25 1305)  BP: (!) 84/0 (04/21/25 0617)  SpO2: 100 % (04/21/25 1305) Vital Signs (24h Range):  Temp:  [98.1 °F (36.7 °C)-98.3 °F (36.8 °C)] 98.2 °F (36.8 °C)  Pulse:  [] 55  Resp:  [8-59] 20  SpO2:  [82 %-100 %] 100 %  BP: ()/(0-84) 84/0     Weight: 109 kg (240 lb 4.8 oz)  Body mass index is 31.7 kg/m².     SpO2: 100 %        Physical Exam   HENT:      Head: Normocephalic.      Mouth/Throat:      Mouth: Mucous membranes are moist.   Eyes:      Pupils: Pupils are equal, round, and reactive to light.   Cardiovascular:      Rate and Rhythm: Normal rate and regular rhythm.      Pulses:           Radial pulses are 2+ on the right side and 2+ on the left side.        Dorsalis pedis pulses are 2+ on the right side and 2+ on the left side.        Posterior tibial pulses are 2+ on the right side and 2+ on the left side.      Heart sounds: Normal heart sounds, S1 normal and S2 normal.      Comments: LVAD with hum heard  Pulmonary:      Effort: Pulmonary effort is normal.   Abdominal:      General: Abdomen is flat.   Musculoskeletal:         General: Normal range of motion.      Right lower leg: No edema.      Left lower leg: No edema.   Skin:     General: Skin is warm.      Capillary Refill: Capillary refill takes less than 2 seconds.   Neurological:      General: No focal deficit present.      Mental Status: He is alert.   Psychiatric:         Mood and Affect: Mood normal.     Significant Labs: All pertinent lab results from the last 24 hours have been reviewed.    Significant Imaging: Echocardiogram: Transthoracic echo (TTE) complete (Cupid Only):   Results for orders placed or performed during the hospital  encounter of 11/06/24   Echo   Result Value Ref Range    LV Diastolic Volume 231.29 mL    Echo EF Estimated 11 %    LV Systolic Volume 205.27 mL    IVS 1.2 (A) 0.6 - 1.1 cm    LVIDd 6.7 (A) 3.5 - 6.0 cm    LVIDs 6.2 (A) 2.1 - 4.0 cm    LVOT diameter 2.1 cm    PW 0.9 0.6 - 1.1 cm    RV- crump basal diam 4.3 cm    RV S' 5.45 cm/s    LA size 4.3 cm    Left Atrium Major Axis 6.81 cm    Left Atrium Minor Axis 6.39 cm    LA Vol (MOD) 106.30 mL    RA Major Axis 5.52 cm    RA Area 25.8 cm2    MV Peak E Jorge 0.55 m/s    TDI LATERAL 0.05 m/s    TDI SEPTAL 0.03 m/s    TV peak gradient 27 mmHg    TR Max Jorge 2.60 m/s    Ascending aorta 3.18 cm    STJ 3.68 cm    IVC diameter 0.94 cm    Sinus 3.74 cm    LA WIDTH 4.25 cm    RA Width 5.18 cm    TAPSE 0.99 cm    BSA 2.33 m2    LV Systolic Volume Index 89.2 mL/m2    LV Diastolic Volume Index 100.56 mL/m2    LVOT area 3.5 cm2    FS 7.5 (A) 28 - 44 %    Left Ventricle Relative Wall Thickness 0.27 cm    LV mass 317.3 g    LV Mass Index 138.0 g/m2    E/E' ratio 13.75 m/s    LV SEPTAL E/E' RATIO 18.33 m/s    SARABJIT 44.5 mL/m2    LV LATERAL E/E' RATIO 11.00 m/s    LA Vol 102.42 cm3    RV/LV Ratio 0.64 cm    SARABJIT (MOD) 46.2 mL/m2    Triscuspid Valve Regurgitation Peak Gradient 27 mmHg    Mean e' 0.04 m/s    ZLVIDS 1.12     ZLVIDD -2.85     TV resting pulmonary artery pressure 30 mmHg    RV TB RVSP 6 mmHg    Est. RA pres 3 mmHg    LVAD  Rate 6,300 rpm    Narrative      LVAD: There is a Heartmate III LVAD running at 6,300 RPM. The aortic   valve does not open. The ventricular septum is at midline. Inflow cannula   well seated at the apex. Outflow graft not visualized.    Left Ventricle: The left ventricle is moderately dilated. Normal wall   thickness. There is eccentric hypertrophy. Global hypokinesis present.   There is severely reduced systolic function with a visually estimated   ejection fraction of 10 -15%.    Right Ventricle: Mild right ventricular enlargement. Wall thickness is   normal.  Right ventricle wall motion has global hypokinesis. Systolic   function is moderately reduced.This was assessed from the subcostal   window.    Left Atrium: Left atrium is moderately dilated.    Right Atrium: Right atrium is dilated.    Aortic Valve: There is continuous mild aortic regurgitation.    Tricuspid Valve: There is mild regurgitation.    Pulmonary Artery: The estimated pulmonary artery systolic pressure is   30 mmHg.    IVC/SVC: Normal venous pressure at 3 mmHg.

## 2025-04-21 NOTE — PLAN OF CARE
Recommendations     Recommendation/Intervention:   1. Continue regular diet as tolerated    - please continue to document PO % intake via flowsheets    2. Encourage good intake    3. RD to monitor weight, labs, intake, tolerance     Goals:   1. % nutritional needs met with diet during admission    2. Maintain weight during admission  Nutrition Goal Status: new  Communication of RD Recs: other (comment) (POC)     Nutrition Discharge Planning      Nutrition Discharge Planning: Therapeutic diet (comments), Other (comments)  Therapeutic diet (comments): cardiac diet  Other (comments): heart healthy education provided 4/21

## 2025-04-21 NOTE — PROGRESS NOTES
04/21/2025  Estephania Martinez    Current provider:  Amy Rhodes MD    Device interrogation:      4/21/2025    10:05 AM 4/21/2025     9:05 AM 4/21/2025     8:05 AM 4/21/2025     7:05 AM 4/21/2025     6:01 AM 4/21/2025     5:01 AM 4/21/2025     4:01 AM   TXP LVAD INTERROGATIONS   Type HeartMate3 HeartMate3 HeartMate3 HeartMate3 HeartMate3 HeartMate3 HeartMate3   Flow 5.9 5.9 5.9 5.9 5.8 5.8 5.8   Speed 6300 6300 6300 6300 6300 6300 6300   PI 1.7 1.8 1.7 1.6 4.6 1.6 1.5   Power (Jackson) 5.6 5.5 5.7 5.5 5.6 5.6 5.5   LSL 5900 5900 5900 5900 5900 5900 5900   Pulsatility Intermittent pulse Intermittent pulse Intermittent pulse Intermittent pulse Intermittent pulse Intermittent pulse Intermittent pulse          Rounded on Tim Richards to ensure all mechanical assist device settings (IABP or VAD) were appropriate and all parameters were within limits.  I was able to ensure all back up equipment was present, the staff had no issues, and the Perfusion Department daily rounding was complete.      For implantable VADs: Interrogation of Ventricular assist device was performed with analysis of device parameters and review of device function. I have personally reviewed the interrogation findings and agree with findings as stated.     In emergency, the nursing units have been notified to contact the perfusion department either by:  Calling [phone] from 630am to 4pm Mon thru Fri, utilizing the On-Call Finder functionality of Epic and searching for Perfusion, or by contacting the hospital  from 4pm to 630am and on weekends and asking to speak with the perfusionist on call.    10:28 AM

## 2025-04-21 NOTE — PT/OT/SLP PROGRESS
Physical Therapy      Patient Name:  Tim Richards   MRN:  [id number]    Patient not seen today secondary to Nurse/ NIKKO hold due to multiple runs of VT this morning . Will follow-up when appropriate.    4/21/2025

## 2025-04-21 NOTE — CONSULTS
"[facility]  Adult Nutrition  Consult Note    SUMMARY     Recommendations    Recommendation/Intervention:   1. Continue regular diet as tolerated    - please continue to document PO % intake via flowsheets    2. Encourage good intake    3. RD to monitor weight, labs, intake, tolerance    Goals:   1. % nutritional needs met with diet during admission    2. Maintain weight during admission  Nutrition Goal Status: new  Communication of RD Recs: other (comment) (POC)    Nutrition Discharge Planning     Nutrition Discharge Planning: Therapeutic diet (comments), Other (comments)  Therapeutic diet (comments): cardiac diet  Other (comments): heart healthy education provided 4/21    Assessment and Plan    No nutrition diagnosis at this time.     Reason for Assessment    Reason For Assessment: consult (VAD pt)  Diagnosis: cardiac disease (ventricular tachycardia)  General Information Comments: Pt admitted with ventricular tachycardia. PMHx: CHF, HTN, gout, dilated cardiomyopathy, disorder of kidney and ureter, Ventricular tachycardia, thyroid disease, ICD infection, a-fib, atrial flutter. No recent surgical hx. LVAD present. No edema noted. No GI s/s - BM: 4/19. Having a good intake - PO: %. Pt looks visually well nourished for age and condition, no concerns for malnutrition.    Nutrition/Diet History    Nutrition Intake History: 2 meals and snacks  Food Preferences: like fruit and coffee  Spiritual, Cultural Beliefs, Religion Practices, Values that Affect Care: no  Food Allergies: NKFA  Factors Affecting Nutritional Intake: None identified at this time    Nutrition Related Social Determinants of Health: SDOH: Adequate food in home environment    Food Insecurity: No Food Insecurity (4/20/2025)    Hunger Vital Sign     Worried About Running Out of Food in the Last Year: Never true     Ran Out of Food in the Last Year: Never true     Anthropometrics    Height: 6' 1" (185.4 cm)  Height " (inches): 73 in  Height Method: Stated  Weight: 109 kg (240 lb 4.8 oz)  Weight (lb): 240.3 lb  Weight Method: Bed Scale  Ideal Body Weight (IBW), Male: 184 lb  % Ideal Body Weight, Male (lb): 131.2 %  BMI (Calculated): 31.7  BMI Grade: 30 - 34.9- obesity - grade I  Usual Body Weight (UBW), k.4 kg  % Usual Body Weight: 107.72    Lab/Procedures/Meds    Pertinent Labs Reviewed: reviewed  Pertinent Labs Comments: hemoglobin 12.4 low, BUN 23 high, creatinine 1.9 high, GFR 40 low, albumin 3.2 low, CRP 16.2 high  Pertinent Medications Reviewed: reviewed  Pertinent Medications Comments: amlodipine, cefadroxil, furosemide, levothyroxine, magnesium oxide, metoprolol, mirtazapine, omega 3, pantoprazole, warfarin, amiodarone    Estimated/Assessed Needs    Weight Used For Calorie Calculations: 109 kg (240 lb 4.8 oz)  Energy Calorie Requirements (kcal): 1964 kcal  Energy Need Method: Berkeley-St Jeor (no AF (BMI > 30))  Protein Requirements: 67-84 g/pro (0.8-1.0 g/kg)  Weight Used For Protein Calculations: 83.5 kg (184 lb) (IBW)        RDA Method (mL):   CHO Requirement: 246 g    Nutrition Prescription Ordered    Current Diet Order: regular    Evaluation of Received Nutrient/Fluid Intake    Energy Calories Required: meeting needs  Protein Required: meeting needs  Fluid Required: other (see comments) (per MD)  Tolerance: tolerating  % Intake of Estimated Energy Needs: 75 - 100 %  % Meal Intake: 75 - 100 %    Nutrition Risk    Level of Risk/Frequency of Follow-up: low     Monitor and Evaluation    Monitor and Evaluation: Energy intake, Food and beverage intake, Protein intake, Carbohydrate intake, Diet order, Food and nutrition knowledge, Weight, Electrolyte and renal panel, Beliefs and attitudes, Gastrointestinal profile, Glucose/endocrine profile, Inflammatory profile, Lipid profile, Nutrition focused physical findings     Nutrition Follow-Up    RD Follow-up?: Yes

## 2025-04-21 NOTE — NURSING
"Pt w/ multiple runs of VT this morning, symptomatic with "pressure in chest", unable to get BP during runs. Flows maintained on LVAD. AICD off, no internal or external shocks given.  "

## 2025-04-21 NOTE — EICU
Virtual ICU Quality Rounds    Admit Date: 4/20/2025  Hospital Day: 1    ICU Day: 1d 3h    24H Vital Sign Range:  Temp:  [98.1 °F (36.7 °C)-98.3 °F (36.8 °C)]   Pulse:  []   Resp:  [8-59]   BP: ()/(0-84)   SpO2:  [82 %-100 %]     VICU Surveillance Screening                                           Video assessment done. NAD noted or reported @ present

## 2025-04-21 NOTE — PLAN OF CARE
Patient heart rhythm sustained monomorphic ventricular tachycardia at 0514. Patient awake, alert, and oriented with visible tremors, groaning. Patient connected to defibrillator. Rhythm analyzed and shockable rhythm. Patient shocked with 120 joules and converted back to sinus rhythm with artifact from LVAD. EKG performed. Patient had a pulse during entirety of event. Laith Marsh MD at bedside. Lidocaine infusion started.

## 2025-04-21 NOTE — PROGRESS NOTES
[facility]  Cardiac Electrophysiology  Progress Note    Admission Date: 4/20/2025  Code Status: Full Code   Attending Physician: Amy Rhodes MD   Expected Discharge Date: 4/23/2025  Principal Problem:Ventricular tachycardia    Subjective:     Interval History: Since overnight admission required 1 external shock. Subq ICD tachytherapies turned off. On lidocaine 3gtt.     ROS12 point ROS negative except for HPI.   Objective:     Vital Signs (Most Recent):  Temp: 98.2 °F (36.8 °C) (04/21/25 1105)  Pulse: (!) 55 (04/21/25 1305)  Resp: 20 (04/21/25 1305)  BP: (!) 84/0 (04/21/25 0617)  SpO2: 100 % (04/21/25 1305) Vital Signs (24h Range):  Temp:  [98.1 °F (36.7 °C)-98.3 °F (36.8 °C)] 98.2 °F (36.8 °C)  Pulse:  [] 55  Resp:  [8-59] 20  SpO2:  [82 %-100 %] 100 %  BP: ()/(0-84) 84/0     Weight: 109 kg (240 lb 4.8 oz)  Body mass index is 31.7 kg/m².     SpO2: 100 %        Physical Exam   HENT:      Head: Normocephalic.      Mouth/Throat:      Mouth: Mucous membranes are moist.   Eyes:      Pupils: Pupils are equal, round, and reactive to light.   Cardiovascular:      Rate and Rhythm: Normal rate and regular rhythm.      Pulses:           Radial pulses are 2+ on the right side and 2+ on the left side.        Dorsalis pedis pulses are 2+ on the right side and 2+ on the left side.        Posterior tibial pulses are 2+ on the right side and 2+ on the left side.      Heart sounds: Normal heart sounds, S1 normal and S2 normal.      Comments: LVAD with hum heard  Pulmonary:      Effort: Pulmonary effort is normal.   Abdominal:      General: Abdomen is flat.   Musculoskeletal:         General: Normal range of motion.      Right lower leg: No edema.      Left lower leg: No edema.   Skin:     General: Skin is warm.      Capillary Refill: Capillary refill takes less than 2 seconds.   Neurological:      General: No focal deficit present.      Mental Status: He is alert.   Psychiatric:         Mood  and Affect: Mood normal.     Significant Labs: All pertinent lab results from the last 24 hours have been reviewed.    Significant Imaging: Echocardiogram: Transthoracic echo (TTE) complete (Cupid Only):   Results for orders placed or performed during the hospital encounter of 11/06/24   Echo   Result Value Ref Range    LV Diastolic Volume 231.29 mL    Echo EF Estimated 11 %    LV Systolic Volume 205.27 mL    IVS 1.2 (A) 0.6 - 1.1 cm    LVIDd 6.7 (A) 3.5 - 6.0 cm    LVIDs 6.2 (A) 2.1 - 4.0 cm    LVOT diameter 2.1 cm    PW 0.9 0.6 - 1.1 cm    RV- crump basal diam 4.3 cm    RV S' 5.45 cm/s    LA size 4.3 cm    Left Atrium Major Axis 6.81 cm    Left Atrium Minor Axis 6.39 cm    LA Vol (MOD) 106.30 mL    RA Major Axis 5.52 cm    RA Area 25.8 cm2    MV Peak E Jorge 0.55 m/s    TDI LATERAL 0.05 m/s    TDI SEPTAL 0.03 m/s    TV peak gradient 27 mmHg    TR Max Jorge 2.60 m/s    Ascending aorta 3.18 cm    STJ 3.68 cm    IVC diameter 0.94 cm    Sinus 3.74 cm    LA WIDTH 4.25 cm    RA Width 5.18 cm    TAPSE 0.99 cm    BSA 2.33 m2    LV Systolic Volume Index 89.2 mL/m2    LV Diastolic Volume Index 100.56 mL/m2    LVOT area 3.5 cm2    FS 7.5 (A) 28 - 44 %    Left Ventricle Relative Wall Thickness 0.27 cm    LV mass 317.3 g    LV Mass Index 138.0 g/m2    E/E' ratio 13.75 m/s    LV SEPTAL E/E' RATIO 18.33 m/s    SARABJIT 44.5 mL/m2    LV LATERAL E/E' RATIO 11.00 m/s    LA Vol 102.42 cm3    RV/LV Ratio 0.64 cm    SARABJIT (MOD) 46.2 mL/m2    Triscuspid Valve Regurgitation Peak Gradient 27 mmHg    Mean e' 0.04 m/s    ZLVIDS 1.12     ZLVIDD -2.85     TV resting pulmonary artery pressure 30 mmHg    RV TB RVSP 6 mmHg    Est. RA pres 3 mmHg    LVAD  Rate 6,300 rpm    Narrative      LVAD: There is a Heartmate III LVAD running at 6,300 RPM. The aortic   valve does not open. The ventricular septum is at midline. Inflow cannula   well seated at the apex. Outflow graft not visualized.    Left Ventricle: The left ventricle is moderately dilated. Normal  wall   thickness. There is eccentric hypertrophy. Global hypokinesis present.   There is severely reduced systolic function with a visually estimated   ejection fraction of 10 -15%.    Right Ventricle: Mild right ventricular enlargement. Wall thickness is   normal. Right ventricle wall motion has global hypokinesis. Systolic   function is moderately reduced.This was assessed from the subcostal   window.    Left Atrium: Left atrium is moderately dilated.    Right Atrium: Right atrium is dilated.    Aortic Valve: There is continuous mild aortic regurgitation.    Tricuspid Valve: There is mild regurgitation.    Pulmonary Artery: The estimated pulmonary artery systolic pressure is   30 mmHg.    IVC/SVC: Normal venous pressure at 3 mmHg.       Assessment and Plan:     * Ventricular tachycardia  59 year-old-man with stage D HFrEF s/p ICD (2014) on HM3 pump.. He also has recurrent VT as well as atrial flutter with RVR and is on chronic amiodarone. He was previously on Mexiletine but due to insurance issues, was stopped on 3/26/2025 and he was placed on Metoprolol low dose 25 mg. He is now presenting with VT and multiple shocks form his SICD. EP consulted for VT>      Recommendations:   - Continue amiodarone gtt.   - Wean off lidocaine gtt as patient tolerates from VT standpoint. Once off can restart mexiletine.   - Continue BB.  - Consult psych to see if patient can be restarted on xanax.   - For full recommendations, please see staff attestation.            Sera Huddleston MD  Cardiac Electrophysiology  [facility]

## 2025-04-21 NOTE — PLAN OF CARE
CICU DAILY GOALS       A: Awake    RASS: Goal -    Actual - RASS (Newman Agitation-Sedation Scale): alert and calm   Restraint necessity:    B: Breath   SBT: Not intubated   C: Coordinate A & B, analgesics/sedatives   Pain: managed    SAT: Not intubated  D: Delirium   CAM-ICU:    E: Early(intubated/ Progressive (non-intubated) Mobility   MOVE Screen: Fail   Activity: Activity Management: Rolling - L1  FAS: Feeding/Nutrition   Diet order: Diet/Nutrition Received: regular,   Fluid restriction:     Nutritional Supplement Intake: Quantity 0, Type:  0  T: Thrombus   DVT prophylaxis: VTE Core Measure: Pharmacological prophylaxis initiated/maintained  H: HOB Elevation   Head of Bed (HOB) Positioning: HOB elevated  U: Ulcer Prophylaxis   GI: yes  G: Glucose control   managed    S: Skin   Bathing/Skin Care: back care;bath, complete;dressed/undressed (04/20/25 1700)  Wounds: No  Wound care consulted: No  B: Bowel Function   no issues   I: Indwelling Catheters   [place] necessity:     CVC necessity: No  D: De-escalation Antibx   na  Plan for the day   Multiple episodes of NSVT this AM. No runs since approx 0900. Weaned Lido gtt. Initiated CIWA q4hrs    Family/Goals of care/Code Status   Code Status: Full Code

## 2025-04-21 NOTE — PROGRESS NOTES
Visited with patient today for check in. Patients emergency bag with him at this time no visible damage. Patient does have his UBC with him. No required maintenance at this time. Patient stated all equipment is working as intended and does not have any needs. Reminded patient o give ma call if he needs anything.

## 2025-04-21 NOTE — PROGRESS NOTES
04/20/2025  Hardeep Lorenzo    Current provider:  Amy Rhodes MD    Device interrogation:      4/20/2025     8:01 PM 4/20/2025     7:01 PM 4/20/2025     6:00 PM 4/20/2025     5:00 PM 4/20/2025     4:00 PM 4/20/2025     3:00 PM 4/20/2025     2:00 PM   TXP LVAD INTERROGATIONS   Type HeartMate3 HeartMate3 HeartMate3 HeartMate3 HeartMate3 HeartMate3 HeartMate3   Flow 5.8 5.6 5.6 5.6 5.6 5.8 5.7   Speed 6300 6300 6300 6300 6300 6300 6300   PI 1.8 2.8 2..7 2.6 2.5 1.8 2.4   Power (Jackson) 5.6 5.5 5.4 5.5 5.4 5.5 5.6   LSL 5900 5900 5900 5900 5900 5900 5900   Pulsatility Intermittent pulse Intermittent pulse Intermittent pulse Intermittent pulse Intermittent pulse Intermittent pulse Intermittent pulse          Rounded on Tim Richards to ensure all mechanical assist device settings (IABP or VAD) were appropriate and all parameters were within limits.  I was able to ensure all back up equipment was present, the staff had no issues, and the Perfusion Department daily rounding was complete.      For implantable VADs: Interrogation of Ventricular assist device was performed with analysis of device parameters and review of device function. I have personally reviewed the interrogation findings and agree with findings as stated.     In emergency, the nursing units have been notified to contact the perfusion department either by:  Calling [phone] from 630am to 4pm Mon thru Fri, utilizing the On-Call Finder functionality of Epic and searching for Perfusion, or by contacting the hospital  from 4pm to 630am and on weekends and asking to speak with the perfusionist on call.    9:27 PM

## 2025-04-21 NOTE — ASSESSMENT & PLAN NOTE
Endocrinology consulted appreciate recs. Given TSH 4.7 he is essentially euthyroid and unlikely to contribute to arrhythmia  -Continue levothyroxine 125 mcg daily  -Recheck TSH and FT4 in 3-4 weeks   -OK to continue amio from Endocrinology standpoint

## 2025-04-21 NOTE — PLAN OF CARE
Patient heart rhythm back in ventricular tachycardia. Rhythm analyzed; shock not advised. Laith Guallpa MD notified regarding patient status. Per MD, patient not to be shocked. MD arrived at bedside. 2mg IV midazolam and 300mg IV amiodarone bolus ordered and administered. Patient converted back to sinus rhythm with artifact from LVAD with a rate in the 60s.

## 2025-04-21 NOTE — PLAN OF CARE
CICU DAILY GOALS       A: Awake    RASS: Goal -    Actual - RASS (Newman Agitation-Sedation Scale): alert and calm   Restraint necessity:    B: Breath   SBT: NA   C: Coordinate A & B, analgesics/sedatives   Pain: managed    SAT: NA  D: Delirium   CAM-ICU:    E: Early(intubated/ Progressive (non-intubated) Mobility   MOVE Screen: Pass   Activity: Activity Management: Rolling - L1  FAS: Feeding/Nutrition   Diet order: Diet/Nutrition Received: regular,   Fluid restriction:     Nutritional Supplement Intake: Quantity n/a, Type:  n/a  T: Thrombus   DVT prophylaxis: VTE Core Measure: Pharmacological prophylaxis initiated/maintained  H: HOB Elevation   Head of Bed (HOB) Positioning: HOB at 30 degrees  U: Ulcer Prophylaxis   GI: yes  G: Glucose control   managed    S: Skin   Bathing/Skin Care: back care;bath, complete;dressed/undressed (04/20/25 1700)  Wounds: No  Wound care consulted: No  B: Bowel Function   no issues   I: Indwelling Catheters   [place] necessity:     CVC necessity: No  D: De-escalation Antibx   No  Plan for the day   Continue current support  Family/Goals of care/Code Status   Code Status: Full Code     Persistent ventricular tachycardia (see notes for further information), VS and assessment per flow sheet, patient progressing towards goals as tolerated, plan of care reviewed with Tim Richards and family, all concerns addressed, will continue to monitor.

## 2025-04-21 NOTE — EICU
Intervention Initiated From:  COR / LUIS MANUELU    Esther intervened regarding:  Rounding (Video assessment)            VICU Night Rounds Checklist  24H Vital Sign Range:  Temp:  [98.1 °F (36.7 °C)-98.6 °F (37 °C)]   Pulse:  []   Resp:  [8-33]   BP: ()/(0-70)   SpO2:  [93 %-99 %]         VICU Night Rounds Checklist  24H Vital Sign Range:  Temp:  [98.1 °F (36.7 °C)-98.6 °F (37 °C)]   Pulse:  []   Resp:  [8-33]   BP: ()/(0-84)   SpO2:  [93 %-99 %]

## 2025-04-21 NOTE — SUBJECTIVE & OBJECTIVE
Interval History: Overnight patient had multiple VT recurrences and was shocked the first time. He was also bolused amiodarone and versed and started on lidocaine drip. He has not had any further recurrence. Otherwise he feels fine. HIV screen was positive, second test was negative. PCR pending. Ethanol level 80.    Continuous Infusions:   amiodarone in dextrose 5%  0.5 mg/min Intravenous Continuous        LIDOcaine  3 mg/min Intravenous Continuous 22.5 mL/hr at 04/21/25 1005 3 mg/min at 04/21/25 1005     Scheduled Meds:   amLODIPine  10 mg Oral Daily    cefadroxil  500 mg Oral Q12H    levothyroxine  125 mcg Oral Before breakfast    LIDOcaine (cardiac)  100 mg Intravenous Once    LIDOcaine HCL 10 mg/ml (1%)        magnesium oxide  400 mg Oral Daily    metoprolol succinate  12.5 mg Oral BID    mirtazapine  45 mg Oral QHS    mupirocin   Nasal BID    omega-3 acid ethyl esters  2 g Oral BID    pantoprazole  40 mg Oral Daily with lunch    warfarin  2 mg Oral Daily     PRN Meds:  Current Facility-Administered Medications:     LIDOcaine HCL 10 mg/ml (1%), , ,     lorazepam, 0.5 mg, Intravenous, Q15 Min PRN    LORazepam, 0.5 mg, Oral, Q4H PRN    oxyCODONE-acetaminophen, 1 tablet, Oral, Q6H PRN    Review of patient's allergies indicates:   Allergen Reactions    Lisinopril Anaphylaxis    Hydralazine     Hydralazine analogues      Chronic constipation, impotence, dizziness     Objective:     Vital Signs (Most Recent):  Temp: 98.2 °F (36.8 °C) (04/21/25 0705)  Pulse: 61 (04/21/25 1005)  Resp: 16 (04/21/25 1005)  BP: (!) 84/0 (04/21/25 0617)  SpO2: 99 % (04/21/25 1005) Vital Signs (24h Range):  Temp:  [98.1 °F (36.7 °C)-98.3 °F (36.8 °C)] 98.2 °F (36.8 °C)  Pulse:  [] 61  Resp:  [8-59] 16  SpO2:  [82 %-100 %] 99 %  BP: ()/(0-84) 84/0     Patient Vitals for the past 72 hrs (Last 3 readings):   Weight   04/21/25 0905 109 kg (240 lb 4.8 oz)   04/20/25 1109 109.5 kg (241 lb 6.5 oz)   04/20/25 0800 104.3 kg (230 lb)      Body mass index is 31.7 kg/m².      Intake/Output Summary (Last 24 hours) at 4/21/2025 1202  Last data filed at 4/21/2025 1005  Gross per 24 hour   Intake 1668.44 ml   Output 1900 ml   Net -231.56 ml                Physical Exam  Constitutional:       Appearance: Normal appearance.   HENT:      Head: Normocephalic and atraumatic.      Nose: Nose normal.      Mouth/Throat:      Mouth: Mucous membranes are moist.   Eyes:      Pupils: Pupils are equal, round, and reactive to light.   Cardiovascular:      Rate and Rhythm: Normal rate and regular rhythm.      Pulses: Normal pulses.   Pulmonary:      Effort: No respiratory distress.   Abdominal:      General: There is no distension.      Palpations: Abdomen is soft.      Tenderness: There is no abdominal tenderness.   Musculoskeletal:         General: No signs of injury.   Skin:     General: Skin is warm and dry.   Neurological:      General: No focal deficit present.      Mental Status: He is alert and oriented to person, place, and time.   Psychiatric:         Mood and Affect: Mood normal.         Behavior: Behavior normal.            Significant Labs:  CBC:  Recent Labs   Lab 04/20/25  0827 04/20/25  0833 04/20/25  0941 04/21/25  0305   WBC 6.72  --  3.47* 4.26   RBC 4.59*  --  4.08* 4.48*   HGB 13.2*  --  11.4* 12.4*   HCT 41.7 43 36.5* 40.2     --  188 223   MCV 91  --  90 90   MCH 28.8  --  27.9 27.7   MCHC 31.7*  --  31.2* 30.8*     BNP:  Recent Labs   Lab 04/20/25  0827   *     CMP:  Recent Labs   Lab 04/20/25  0827 04/20/25  1656 04/21/25  0305   CALCIUM 8.9 8.7 9.1   ALBUMIN 3.9 3.3* 3.2*    136 137   K 3.5 4.3 4.0   CO2 22* 25 24    100 101   BUN 24* 23* 23*   CREATININE 2.3* 2.1* 1.9*   ALKPHOS 113  --  92   ALT 19  --  14   AST 38  --  29   BILITOT 0.3  --  0.3      Coagulation:   Recent Labs   Lab 04/14/25  1513 04/20/25  0941 04/21/25  0305   INR 2.5* 2.7* 3.4*   APTT  --  56.4* 58.3*     LDH:  Recent Labs   Lab 04/21/25  0308         Microbiology:  Microbiology Results (last 7 days)       Procedure Component Value Units Date/Time    Aerobic culture [[id number]] Collected: 04/21/25 0942    Order Status: Sent Specimen: Wound from Abdomen             I have reviewed all pertinent labs within the past 24 hours.    Estimated Creatinine Clearance: 54.8 mL/min (A) (based on SCr of 1.9 mg/dL (H)).

## 2025-04-21 NOTE — PLAN OF CARE
Patient continuing to have episodes of ventricular tachycardia. Laith CHAMBERLAIN remains at bedside. Lidocaine bolus administered by Ena Rosenthal RN. Patient converted back to sinus rhythm with a rate in the 60s.

## 2025-04-22 ENCOUNTER — RESULTS FOLLOW-UP (OUTPATIENT)
Dept: EMERGENCY MEDICINE | Facility: HOSPITAL | Age: 60
End: 2025-04-22

## 2025-04-22 LAB
ABSOLUTE EOSINOPHIL (OHS): 0.05 K/UL
ABSOLUTE MONOCYTE (OHS): 0.53 K/UL (ref 0.3–1)
ABSOLUTE NEUTROPHIL COUNT (OHS): 3.33 K/UL (ref 1.8–7.7)
ANION GAP (OHS): 8 MMOL/L (ref 8–16)
ANION GAP (OHS): 9 MMOL/L (ref 8–16)
APTT PPP: 49.3 SECONDS (ref 21–32)
ASCENDING AORTA: 3.28 CM
BASOPHILS # BLD AUTO: 0.02 K/UL
BASOPHILS NFR BLD AUTO: 0.4 %
BSA FOR ECHO PROCEDURE: 2.37 M2
BUN SERPL-MCNC: 23 MG/DL (ref 6–20)
BUN SERPL-MCNC: 25 MG/DL (ref 6–20)
CALCIUM SERPL-MCNC: 9 MG/DL (ref 8.7–10.5)
CALCIUM SERPL-MCNC: 9 MG/DL (ref 8.7–10.5)
CHLORIDE SERPL-SCNC: 101 MMOL/L (ref 95–110)
CHLORIDE SERPL-SCNC: 102 MMOL/L (ref 95–110)
CO2 SERPL-SCNC: 26 MMOL/L (ref 23–29)
CO2 SERPL-SCNC: 27 MMOL/L (ref 23–29)
CREAT SERPL-MCNC: 1.8 MG/DL (ref 0.5–1.4)
CREAT SERPL-MCNC: 1.8 MG/DL (ref 0.5–1.4)
CV ECHO LV RWT: 0.42 CM
DOP CALC LVOT AREA: 3.8 CM2
DOP CALC LVOT DIAMETER: 2.2 CM
E WAVE DECELERATION TIME: 211 MS
E/A RATIO: 1.2
E/E' RATIO: 9 M/S
ECHO EF ESTIMATED: 19 %
ECHO LV POSTERIOR WALL: 1.1 CM (ref 0.6–1.1)
EJECTION FRACTION: 20 %
ERYTHROCYTE [DISTWIDTH] IN BLOOD BY AUTOMATED COUNT: 14.9 % (ref 11.5–14.5)
FRACTIONAL SHORTENING: 9.4 % (ref 28–44)
GFR SERPLBLD CREATININE-BSD FMLA CKD-EPI: 43 ML/MIN/1.73/M2
GFR SERPLBLD CREATININE-BSD FMLA CKD-EPI: 43 ML/MIN/1.73/M2
GLUCOSE SERPL-MCNC: 87 MG/DL (ref 70–110)
GLUCOSE SERPL-MCNC: 92 MG/DL (ref 70–110)
HCT VFR BLD AUTO: 40.5 % (ref 40–54)
HGB BLD-MCNC: 12.5 GM/DL (ref 14–18)
HIV1 RNA SERPL NAA+PROBE-LOG#: NOT DETECTED {LOG_COPIES}/ML
IMM GRANULOCYTES # BLD AUTO: 0.01 K/UL (ref 0–0.04)
IMM GRANULOCYTES NFR BLD AUTO: 0.2 % (ref 0–0.5)
INR PPP: 2.6 (ref 0.8–1.2)
INTERVENTRICULAR SEPTUM: 1.3 CM (ref 0.6–1.1)
LA MAJOR: 5.9 CM
LA MINOR: 6.2 CM
LA WIDTH: 4.1 CM
LDH SERPL-CCNC: 401 U/L (ref 110–260)
LEFT ATRIUM SIZE: 4.6 CM
LEFT ATRIUM VOLUME INDEX MOD: 23 ML/M2
LEFT ATRIUM VOLUME INDEX: 42 ML/M2
LEFT ATRIUM VOLUME MOD: 54 ML
LEFT ATRIUM VOLUME: 97 CM3
LEFT INTERNAL DIMENSION IN SYSTOLE: 4.8 CM (ref 2.1–4)
LEFT VENTRICLE DIASTOLIC VOLUME INDEX: 57.76 ML/M2
LEFT VENTRICLE DIASTOLIC VOLUME: 134 ML
LEFT VENTRICLE MASS INDEX: 110.6 G/M2
LEFT VENTRICLE SYSTOLIC VOLUME INDEX: 46.6 ML/M2
LEFT VENTRICLE SYSTOLIC VOLUME: 108 ML
LEFT VENTRICULAR INTERNAL DIMENSION IN DIASTOLE: 5.3 CM (ref 3.5–6)
LEFT VENTRICULAR MASS: 256.6 G
LV LATERAL E/E' RATIO: 12
LV SEPTAL E/E' RATIO: 7.5
LYMPHOCYTES # BLD AUTO: 0.86 K/UL (ref 1–4.8)
MAGNESIUM SERPL-MCNC: 2.2 MG/DL (ref 1.6–2.6)
MCH RBC QN AUTO: 27.8 PG (ref 27–31)
MCHC RBC AUTO-ENTMCNC: 30.9 G/DL (ref 32–36)
MCV RBC AUTO: 90 FL (ref 82–98)
MV PEAK A VEL: 0.5 M/S
MV PEAK E VEL: 0.6 M/S
NUCLEATED RBC (/100WBC) (OHS): 0 /100 WBC
OHS CV RV/LV RATIO: 1.02 CM
PHOSPHATE SERPL-MCNC: 3 MG/DL (ref 2.7–4.5)
PISA TR MAX VEL: 2.5 M/S
PLATELET # BLD AUTO: 205 K/UL (ref 150–450)
PMV BLD AUTO: 10.3 FL (ref 9.2–12.9)
POTASSIUM SERPL-SCNC: 4.2 MMOL/L (ref 3.5–5.1)
POTASSIUM SERPL-SCNC: 4.4 MMOL/L (ref 3.5–5.1)
PROTHROMBIN TIME: 26.9 SECONDS (ref 9–12.5)
RA MAJOR: 6.48 CM
RA PRESSURE ESTIMATED: 0 MMHG
RA WIDTH: 4.96 CM
RBC # BLD AUTO: 4.5 M/UL (ref 4.6–6.2)
RELATIVE EOSINOPHIL (OHS): 1 %
RELATIVE LYMPHOCYTE (OHS): 17.9 % (ref 18–48)
RELATIVE MONOCYTE (OHS): 11 % (ref 4–15)
RELATIVE NEUTROPHIL (OHS): 69.5 % (ref 38–73)
RIGHT VENTRICLE DIASTOLIC BASEL DIMENSION: 5.4 CM
RV TB RVSP: 3 MMHG
SINUS: 3.6 CM
SODIUM SERPL-SCNC: 136 MMOL/L (ref 136–145)
SODIUM SERPL-SCNC: 137 MMOL/L (ref 136–145)
STJ: 3.14 CM
TDI LATERAL: 0.05 M/S
TDI SEPTAL: 0.08 M/S
TDI: 0.07 M/S
TRICUSPID ANNULAR PLANE SYSTOLIC EXCURSION: 1.26 CM
TV PEAK GRADIENT: 24 MMHG
TV REST PULMONARY ARTERY PRESSURE: 25 MMHG
WBC # BLD AUTO: 4.8 K/UL (ref 3.9–12.7)
Z-SCORE OF LEFT VENTRICULAR DIMENSION IN END DIASTOLE: -5.58
Z-SCORE OF LEFT VENTRICULAR DIMENSION IN END SYSTOLE: -1.19

## 2025-04-22 PROCEDURE — 99222 1ST HOSP IP/OBS MODERATE 55: CPT | Mod: ,,, | Performed by: PSYCHIATRY & NEUROLOGY

## 2025-04-22 PROCEDURE — 97161 PT EVAL LOW COMPLEX 20 MIN: CPT

## 2025-04-22 PROCEDURE — 85025 COMPLETE CBC W/AUTO DIFF WBC: CPT | Performed by: STUDENT IN AN ORGANIZED HEALTH CARE EDUCATION/TRAINING PROGRAM

## 2025-04-22 PROCEDURE — 82310 ASSAY OF CALCIUM: CPT | Performed by: STUDENT IN AN ORGANIZED HEALTH CARE EDUCATION/TRAINING PROGRAM

## 2025-04-22 PROCEDURE — 97535 SELF CARE MNGMENT TRAINING: CPT

## 2025-04-22 PROCEDURE — 25000003 PHARM REV CODE 250

## 2025-04-22 PROCEDURE — 25000003 PHARM REV CODE 250: Performed by: INTERNAL MEDICINE

## 2025-04-22 PROCEDURE — 85730 THROMBOPLASTIN TIME PARTIAL: CPT | Performed by: STUDENT IN AN ORGANIZED HEALTH CARE EDUCATION/TRAINING PROGRAM

## 2025-04-22 PROCEDURE — 83615 LACTATE (LD) (LDH) ENZYME: CPT | Performed by: STUDENT IN AN ORGANIZED HEALTH CARE EDUCATION/TRAINING PROGRAM

## 2025-04-22 PROCEDURE — 93750 INTERROGATION VAD IN PERSON: CPT | Mod: ,,, | Performed by: INTERNAL MEDICINE

## 2025-04-22 PROCEDURE — 25000003 PHARM REV CODE 250: Performed by: STUDENT IN AN ORGANIZED HEALTH CARE EDUCATION/TRAINING PROGRAM

## 2025-04-22 PROCEDURE — 83735 ASSAY OF MAGNESIUM: CPT | Performed by: STUDENT IN AN ORGANIZED HEALTH CARE EDUCATION/TRAINING PROGRAM

## 2025-04-22 PROCEDURE — 20000000 HC ICU ROOM

## 2025-04-22 PROCEDURE — 94761 N-INVAS EAR/PLS OXIMETRY MLT: CPT

## 2025-04-22 PROCEDURE — 25500020 PHARM REV CODE 255: Performed by: INTERNAL MEDICINE

## 2025-04-22 PROCEDURE — 99900035 HC TECH TIME PER 15 MIN (STAT)

## 2025-04-22 PROCEDURE — 85610 PROTHROMBIN TIME: CPT | Performed by: STUDENT IN AN ORGANIZED HEALTH CARE EDUCATION/TRAINING PROGRAM

## 2025-04-22 PROCEDURE — 97165 OT EVAL LOW COMPLEX 30 MIN: CPT

## 2025-04-22 PROCEDURE — 63600175 PHARM REV CODE 636 W HCPCS: Mod: JZ,TB | Performed by: STUDENT IN AN ORGANIZED HEALTH CARE EDUCATION/TRAINING PROGRAM

## 2025-04-22 PROCEDURE — 84100 ASSAY OF PHOSPHORUS: CPT | Performed by: STUDENT IN AN ORGANIZED HEALTH CARE EDUCATION/TRAINING PROGRAM

## 2025-04-22 PROCEDURE — 99233 SBSQ HOSP IP/OBS HIGH 50: CPT | Mod: ICN,,, | Performed by: INTERNAL MEDICINE

## 2025-04-22 PROCEDURE — 27000221 HC OXYGEN, UP TO 24 HOURS

## 2025-04-22 PROCEDURE — 97116 GAIT TRAINING THERAPY: CPT

## 2025-04-22 PROCEDURE — 99291 CRITICAL CARE FIRST HOUR: CPT | Mod: 25,,, | Performed by: INTERNAL MEDICINE

## 2025-04-22 PROCEDURE — 27000248 HC VAD-ADDITIONAL DAY

## 2025-04-22 RX ORDER — LORAZEPAM 2 MG/ML
0.5 INJECTION INTRAMUSCULAR
Status: DISCONTINUED | OUTPATIENT
Start: 2025-04-22 | End: 2025-04-23 | Stop reason: HOSPADM

## 2025-04-22 RX ORDER — ZOLPIDEM TARTRATE 5 MG/1
10 TABLET ORAL ONCE
Status: COMPLETED | OUTPATIENT
Start: 2025-04-22 | End: 2025-04-22

## 2025-04-22 RX ORDER — MEXILETINE HYDROCHLORIDE 250 MG/1
250 CAPSULE ORAL EVERY 8 HOURS
Qty: 90 CAPSULE | Refills: 11 | Status: SHIPPED | OUTPATIENT
Start: 2025-04-22 | End: 2026-04-22

## 2025-04-22 RX ORDER — LORAZEPAM 0.5 MG/1
0.5 TABLET ORAL EVERY 4 HOURS PRN
Status: DISCONTINUED | OUTPATIENT
Start: 2025-04-22 | End: 2025-04-23 | Stop reason: HOSPADM

## 2025-04-22 RX ORDER — WARFARIN SODIUM 5 MG/1
5 TABLET ORAL DAILY
Status: DISCONTINUED | OUTPATIENT
Start: 2025-04-22 | End: 2025-04-23 | Stop reason: HOSPADM

## 2025-04-22 RX ORDER — MEXILETINE HYDROCHLORIDE 250 MG/1
250 CAPSULE ORAL EVERY 8 HOURS
Status: DISCONTINUED | OUTPATIENT
Start: 2025-04-22 | End: 2025-04-23 | Stop reason: HOSPADM

## 2025-04-22 RX ADMIN — ZOLPIDEM TARTRATE 10 MG: 5 TABLET ORAL at 11:04

## 2025-04-22 RX ADMIN — MUPIROCIN: 20 OINTMENT TOPICAL at 09:04

## 2025-04-22 RX ADMIN — LORAZEPAM 0.5 MG: 0.5 TABLET ORAL at 12:04

## 2025-04-22 RX ADMIN — METOPROLOL SUCCINATE 12.5 MG: 25 TABLET, EXTENDED RELEASE ORAL at 09:04

## 2025-04-22 RX ADMIN — CEFADROXIL 500 MG: 500 CAPSULE ORAL at 09:04

## 2025-04-22 RX ADMIN — OMEGA-3-ACID ETHYL ESTERS 2 G: 1 CAPSULE, LIQUID FILLED ORAL at 09:04

## 2025-04-22 RX ADMIN — LIDOCAINE HYDROCHLORIDE 1 MG/MIN: 8 INJECTION, SOLUTION INTRAVENOUS at 09:04

## 2025-04-22 RX ADMIN — Medication 6 MG: at 09:04

## 2025-04-22 RX ADMIN — AMIODARONE HYDROCHLORIDE 400 MG: 200 TABLET ORAL at 09:04

## 2025-04-22 RX ADMIN — MIRTAZAPINE 45 MG: 15 TABLET, FILM COATED ORAL at 09:04

## 2025-04-22 RX ADMIN — HUMAN ALBUMIN MICROSPHERES AND PERFLUTREN 0.11 MG: 10; .22 INJECTION, SOLUTION INTRAVENOUS at 02:04

## 2025-04-22 RX ADMIN — MEXILETINE HYDROCHLORIDE 250 MG: 250 CAPSULE ORAL at 09:04

## 2025-04-22 RX ADMIN — PANTOPRAZOLE SODIUM 40 MG: 40 TABLET, DELAYED RELEASE ORAL at 12:04

## 2025-04-22 RX ADMIN — OXYCODONE HYDROCHLORIDE AND ACETAMINOPHEN 1 TABLET: 5; 325 TABLET ORAL at 09:04

## 2025-04-22 RX ADMIN — LEVOTHYROXINE SODIUM 125 MCG: 25 TABLET ORAL at 06:04

## 2025-04-22 RX ADMIN — Medication 400 MG: at 09:04

## 2025-04-22 RX ADMIN — AMLODIPINE BESYLATE 10 MG: 10 TABLET ORAL at 09:04

## 2025-04-22 RX ADMIN — WARFARIN SODIUM 5 MG: 5 TABLET ORAL at 04:04

## 2025-04-22 RX ADMIN — LORAZEPAM 0.5 MG: 0.5 TABLET ORAL at 06:04

## 2025-04-22 NOTE — SUBJECTIVE & OBJECTIVE
Interval History: Patient has not episode of VT since 8:54AM yesterday. Lidocaine gtt dropped to 2 yesterday.     ROS12 point ROS negative except for HPI.   Objective:     Vital Signs (Most Recent):  Temp: 97.6 °F (36.4 °C) (04/22/25 0301)  Pulse: 61 (04/22/25 0601)  Resp: 18 (04/22/25 0601)  BP: 123/89 (04/22/25 0601)  SpO2: (!) 94 % (04/22/25 0601) Vital Signs (24h Range):  Temp:  [97.6 °F (36.4 °C)-98.3 °F (36.8 °C)] 97.6 °F (36.4 °C)  Pulse:  [54-64] 61  Resp:  [11-27] 18  SpO2:  [94 %-100 %] 94 %  BP: ()/(0-89) 123/89     Weight: 108 kg (238 lb 1.6 oz)  Body mass index is 31.41 kg/m².     SpO2: (!) 94 %        Physical Exam   HENT:      Head: Normocephalic.      Mouth/Throat:      Mouth: Mucous membranes are moist.   Eyes:      Pupils: Pupils are equal, round, and reactive to light.   Cardiovascular:      Rate and Rhythm: Normal rate and regular rhythm.      Pulses:           Radial pulses are 2+ on the right side and 2+ on the left side.        Dorsalis pedis pulses are 2+ on the right side and 2+ on the left side.        Posterior tibial pulses are 2+ on the right side and 2+ on the left side.      Heart sounds: Normal heart sounds, S1 normal and S2 normal.      Comments: LVAD with hum heard  Pulmonary:      Effort: Pulmonary effort is normal.   Abdominal:      General: Abdomen is flat.   Musculoskeletal:         General: Normal range of motion.      Right lower leg: No edema.      Left lower leg: No edema.   Skin:     General: Skin is warm.      Capillary Refill: Capillary refill takes less than 2 seconds.   Neurological:      General: No focal deficit present.      Mental Status: He is alert.   Psychiatric:         Mood and Affect: Mood normal.     Significant Labs: All pertinent lab results from the last 24 hours have been reviewed.    Significant Imaging: Echocardiogram: Transthoracic echo (TTE) complete (Cupid Only):   Results for orders placed or performed during the hospital encounter of 11/06/24    Echo   Result Value Ref Range    LV Diastolic Volume 231.29 mL    Echo EF Estimated 11 %    LV Systolic Volume 205.27 mL    IVS 1.2 (A) 0.6 - 1.1 cm    LVIDd 6.7 (A) 3.5 - 6.0 cm    LVIDs 6.2 (A) 2.1 - 4.0 cm    LVOT diameter 2.1 cm    PW 0.9 0.6 - 1.1 cm    RV- crump basal diam 4.3 cm    RV S' 5.45 cm/s    LA size 4.3 cm    Left Atrium Major Axis 6.81 cm    Left Atrium Minor Axis 6.39 cm    LA Vol (MOD) 106.30 mL    RA Major Axis 5.52 cm    RA Area 25.8 cm2    MV Peak E Jorge 0.55 m/s    TDI LATERAL 0.05 m/s    TDI SEPTAL 0.03 m/s    TV peak gradient 27 mmHg    TR Max Jorge 2.60 m/s    Ascending aorta 3.18 cm    STJ 3.68 cm    IVC diameter 0.94 cm    Sinus 3.74 cm    LA WIDTH 4.25 cm    RA Width 5.18 cm    TAPSE 0.99 cm    BSA 2.33 m2    LV Systolic Volume Index 89.2 mL/m2    LV Diastolic Volume Index 100.56 mL/m2    LVOT area 3.5 cm2    FS 7.5 (A) 28 - 44 %    Left Ventricle Relative Wall Thickness 0.27 cm    LV mass 317.3 g    LV Mass Index 138.0 g/m2    E/E' ratio 13.75 m/s    LV SEPTAL E/E' RATIO 18.33 m/s    SARABJIT 44.5 mL/m2    LV LATERAL E/E' RATIO 11.00 m/s    LA Vol 102.42 cm3    RV/LV Ratio 0.64 cm    SARABJIT (MOD) 46.2 mL/m2    Triscuspid Valve Regurgitation Peak Gradient 27 mmHg    Mean e' 0.04 m/s    ZLVIDS 1.12     ZLVIDD -2.85     TV resting pulmonary artery pressure 30 mmHg    RV TB RVSP 6 mmHg    Est. RA pres 3 mmHg    LVAD  Rate 6,300 rpm    Narrative      LVAD: There is a Heartmate III LVAD running at 6,300 RPM. The aortic   valve does not open. The ventricular septum is at midline. Inflow cannula   well seated at the apex. Outflow graft not visualized.    Left Ventricle: The left ventricle is moderately dilated. Normal wall   thickness. There is eccentric hypertrophy. Global hypokinesis present.   There is severely reduced systolic function with a visually estimated   ejection fraction of 10 -15%.    Right Ventricle: Mild right ventricular enlargement. Wall thickness is   normal. Right ventricle wall  motion has global hypokinesis. Systolic   function is moderately reduced.This was assessed from the subcostal   window.    Left Atrium: Left atrium is moderately dilated.    Right Atrium: Right atrium is dilated.    Aortic Valve: There is continuous mild aortic regurgitation.    Tricuspid Valve: There is mild regurgitation.    Pulmonary Artery: The estimated pulmonary artery systolic pressure is   30 mmHg.    IVC/SVC: Normal venous pressure at 3 mmHg.

## 2025-04-22 NOTE — PROGRESS NOTES
[facility] - Cardiac [facility]  Cardiac Electrophysiology  Progress Note    Admission Date: 4/20/2025  Code Status: Full Code   Attending Physician: Amy Rhodes MD   Expected Discharge Date: 4/23/2025  Principal Problem:Ventricular tachycardia    Subjective:     Interval History: Patient has not episode of VT since 8:54AM yesterday. Lidocaine gtt dropped to 2 yesterday.     ROS12 point ROS negative except for HPI.   Objective:     Vital Signs (Most Recent):  Temp: 97.6 °F (36.4 °C) (04/22/25 0301)  Pulse: 61 (04/22/25 0601)  Resp: 18 (04/22/25 0601)  BP: 123/89 (04/22/25 0601)  SpO2: (!) 94 % (04/22/25 0601) Vital Signs (24h Range):  Temp:  [97.6 °F (36.4 °C)-98.3 °F (36.8 °C)] 97.6 °F (36.4 °C)  Pulse:  [54-64] 61  Resp:  [11-27] 18  SpO2:  [94 %-100 %] 94 %  BP: ()/(0-89) 123/89     Weight: 108 kg (238 lb 1.6 oz)  Body mass index is 31.41 kg/m².     SpO2: (!) 94 %        Physical Exam   HENT:      Head: Normocephalic.      Mouth/Throat:      Mouth: Mucous membranes are moist.   Eyes:      Pupils: Pupils are equal, round, and reactive to light.   Cardiovascular:      Rate and Rhythm: Normal rate and regular rhythm.      Pulses:           Radial pulses are 2+ on the right side and 2+ on the left side.        Dorsalis pedis pulses are 2+ on the right side and 2+ on the left side.        Posterior tibial pulses are 2+ on the right side and 2+ on the left side.      Heart sounds: Normal heart sounds, S1 normal and S2 normal.      Comments: LVAD with hum heard  Pulmonary:      Effort: Pulmonary effort is normal.   Abdominal:      General: Abdomen is flat.   Musculoskeletal:         General: Normal range of motion.      Right lower leg: No edema.      Left lower leg: No edema.   Skin:     General: Skin is warm.      Capillary Refill: Capillary refill takes less than 2 seconds.   Neurological:      General: No focal deficit present.      Mental Status: He is alert.   Psychiatric:         Mood and Affect:  Mood normal.     Significant Labs: All pertinent lab results from the last 24 hours have been reviewed.    Significant Imaging: Echocardiogram: Transthoracic echo (TTE) complete (Cupid Only):   Results for orders placed or performed during the hospital encounter of 11/06/24   Echo   Result Value Ref Range    LV Diastolic Volume 231.29 mL    Echo EF Estimated 11 %    LV Systolic Volume 205.27 mL    IVS 1.2 (A) 0.6 - 1.1 cm    LVIDd 6.7 (A) 3.5 - 6.0 cm    LVIDs 6.2 (A) 2.1 - 4.0 cm    LVOT diameter 2.1 cm    PW 0.9 0.6 - 1.1 cm    RV- crump basal diam 4.3 cm    RV S' 5.45 cm/s    LA size 4.3 cm    Left Atrium Major Axis 6.81 cm    Left Atrium Minor Axis 6.39 cm    LA Vol (MOD) 106.30 mL    RA Major Axis 5.52 cm    RA Area 25.8 cm2    MV Peak E Jorge 0.55 m/s    TDI LATERAL 0.05 m/s    TDI SEPTAL 0.03 m/s    TV peak gradient 27 mmHg    TR Max Jorge 2.60 m/s    Ascending aorta 3.18 cm    STJ 3.68 cm    IVC diameter 0.94 cm    Sinus 3.74 cm    LA WIDTH 4.25 cm    RA Width 5.18 cm    TAPSE 0.99 cm    BSA 2.33 m2    LV Systolic Volume Index 89.2 mL/m2    LV Diastolic Volume Index 100.56 mL/m2    LVOT area 3.5 cm2    FS 7.5 (A) 28 - 44 %    Left Ventricle Relative Wall Thickness 0.27 cm    LV mass 317.3 g    LV Mass Index 138.0 g/m2    E/E' ratio 13.75 m/s    LV SEPTAL E/E' RATIO 18.33 m/s    SARABJIT 44.5 mL/m2    LV LATERAL E/E' RATIO 11.00 m/s    LA Vol 102.42 cm3    RV/LV Ratio 0.64 cm    SARABJIT (MOD) 46.2 mL/m2    Triscuspid Valve Regurgitation Peak Gradient 27 mmHg    Mean e' 0.04 m/s    ZLVIDS 1.12     ZLVIDD -2.85     TV resting pulmonary artery pressure 30 mmHg    RV TB RVSP 6 mmHg    Est. RA pres 3 mmHg    LVAD  Rate 6,300 rpm    Narrative      LVAD: There is a Heartmate III LVAD running at 6,300 RPM. The aortic   valve does not open. The ventricular septum is at midline. Inflow cannula   well seated at the apex. Outflow graft not visualized.    Left Ventricle: The left ventricle is moderately dilated. Normal wall    thickness. There is eccentric hypertrophy. Global hypokinesis present.   There is severely reduced systolic function with a visually estimated   ejection fraction of 10 -15%.    Right Ventricle: Mild right ventricular enlargement. Wall thickness is   normal. Right ventricle wall motion has global hypokinesis. Systolic   function is moderately reduced.This was assessed from the subcostal   window.    Left Atrium: Left atrium is moderately dilated.    Right Atrium: Right atrium is dilated.    Aortic Valve: There is continuous mild aortic regurgitation.    Tricuspid Valve: There is mild regurgitation.    Pulmonary Artery: The estimated pulmonary artery systolic pressure is   30 mmHg.    IVC/SVC: Normal venous pressure at 3 mmHg.       Assessment and Plan:     * Ventricular tachycardia  59 year-old-man with stage D HFrEF s/p ICD (2014) on HM3 pump.. He also has recurrent VT as well as atrial flutter with RVR and is on chronic amiodarone. He was previously on Mexiletine but due to insurance issues, was stopped on 3/26/2025 and he was placed on Metoprolol low dose 25 mg. He is now presenting with VT and multiple shocks form his SICD. EP consulted for VT>      Recommendations:   - Continue amiodarone gtt.   - Wean off lidocaine gtt as patient tolerates from VT standpoint. Once off can restart mexiletine.   - Increase metoprolol dose if ok from HTS standpoint.   - Consult psych to see if patient can be restarted on xanax.   - Patient's subq ICD turned off currently, patient would like to keep it off. Will discuss with HTS to get there opinion as well.   - For full recommendations, please see staff attestation.            Sera Huddleston MD  Cardiac Electrophysiology  [facility]

## 2025-04-22 NOTE — ASSESSMENT & PLAN NOTE
Endocrinology consulted appreciate recs.  He is essentially euthyroid and unlikely to contribute to arrhythmia  -Continue levothyroxine 125 mcg daily  -Recheck TSH and FT4 in 3-4 weeks   -OK to continue amio from Endocrinology standpoint

## 2025-04-22 NOTE — PROGRESS NOTES
[facility] - Cardiac Intensive Care  Heart Transplant  Progress Note    Patient Name: Tim Richards  MRN: [id number]  Admission Date: 4/20/2025  Hospital Length of Stay: 2 days  Attending Physician: Isaiah Holder, *  Primary Care Provider: Diego Daniel MD  Principal Problem:Ventricular tachycardia    Subjective:   Interval History: NAEO. No VT all of yesterday. Patient feels well and has no new complaints. Lidocaine titrated down to 1 mg/min with plans to switch it to mexiletine tonight. He has not ambulated yet.    Continuous Infusions:   LIDOcaine  1 mg/min Intravenous Continuous 7.5 mL/hr at 04/22/25 0911 1 mg/min at 04/22/25 0911     Scheduled Meds:   amiodarone  400 mg Oral BID    amLODIPine  10 mg Oral Daily    cefadroxil  500 mg Oral Q12H    levothyroxine  125 mcg Oral Before breakfast    LIDOcaine (cardiac)  100 mg Intravenous Once    magnesium oxide  400 mg Oral Daily    metoprolol succinate  12.5 mg Oral BID    mirtazapine  45 mg Oral QHS    mupirocin   Nasal BID    omega-3 acid ethyl esters  2 g Oral BID    pantoprazole  40 mg Oral Daily with lunch    warfarin  5 mg Oral Daily     PRN Meds:  Current Facility-Administered Medications:     lorazepam, 0.5 mg, Intravenous, Q15 Min PRN    LORazepam, 0.5 mg, Oral, Q4H PRN    melatonin, 6 mg, Oral, Nightly PRN    oxyCODONE-acetaminophen, 1 tablet, Oral, Q6H PRN    Review of patient's allergies indicates:   Allergen Reactions    Lisinopril Anaphylaxis    Hydralazine     Hydralazine analogues      Chronic constipation, impotence, dizziness     Objective:     Vital Signs (Most Recent):  Temp: 97.6 °F (36.4 °C) (04/22/25 0301)  Pulse: (!) 59 (04/22/25 0855)  Resp: 18 (04/22/25 0919)  BP: 123/89 (04/22/25 0601)  SpO2: 95 % (04/22/25 0855) Vital Signs (24h Range):  Temp:  [97.6 °F (36.4 °C)-98.3 °F (36.8 °C)] 97.6 °F (36.4 °C)  Pulse:  [54-61] 59  Resp:  [11-27] 18  SpO2:  [94 %-100 %] 95 %  BP: ()/(0-89) 123/89     Patient Vitals for the past 72 hrs  (Last 3 readings):   Weight   04/22/25 0501 108 kg (238 lb 1.6 oz)   04/21/25 0905 109 kg (240 lb 4.8 oz)   04/20/25 1109 109.5 kg (241 lb 6.5 oz)     Body mass index is 31.41 kg/m².      Intake/Output Summary (Last 24 hours) at 4/22/2025 1045  Last data filed at 4/22/2025 0601  Gross per 24 hour   Intake 924.96 ml   Output 3050 ml   Net -2125.04 ml          Physical Exam  Constitutional:       Appearance: Normal appearance.   HENT:      Head: Normocephalic and atraumatic.      Nose: Nose normal.      Mouth/Throat:      Mouth: Mucous membranes are moist.   Eyes:      Pupils: Pupils are equal, round, and reactive to light.   Cardiovascular:      Rate and Rhythm: Normal rate and regular rhythm.      Pulses: Normal pulses.   Pulmonary:      Effort: No respiratory distress.   Abdominal:      General: There is no distension.      Palpations: Abdomen is soft.      Tenderness: There is no abdominal tenderness.   Musculoskeletal:         General: No signs of injury.   Skin:     General: Skin is warm and dry.   Neurological:      General: No focal deficit present.      Mental Status: He is alert and oriented to person, place, and time.   Psychiatric:         Mood and Affect: Mood normal.         Behavior: Behavior normal.            Significant Labs:  CBC:  Recent Labs   Lab 04/20/25  0941 04/21/25  0305 04/22/25  0315   WBC 3.47* 4.26 4.80   RBC 4.08* 4.48* 4.50*   HGB 11.4* 12.4* 12.5*   HCT 36.5* 40.2 40.5    223 205   MCV 90 90 90   MCH 27.9 27.7 27.8   MCHC 31.2* 30.8* 30.9*     BNP:  Recent Labs   Lab 04/20/25  0827   *     CMP:  Recent Labs   Lab 04/20/25  0827 04/20/25  1656 04/21/25  0305 04/21/25  1500 04/22/25  0315   CALCIUM 8.9 8.7 9.1 9.1 9.0   ALBUMIN 3.9 3.3* 3.2*  --   --     136 137 137 136   K 3.5 4.3 4.0 4.8 4.4   CO2 22* 25 24 29 27    100 101 101 101   BUN 24* 23* 23* 23* 25*   CREATININE 2.3* 2.1* 1.9* 2.0* 1.8*   ALKPHOS 113  --  92  --   --    ALT 19  --  14  --   --    AST  38  --  29  --   --    BILITOT 0.3  --  0.3  --   --       Coagulation:   Recent Labs   Lab 04/20/25  0941 04/21/25  0305 04/22/25  0315   INR 2.7* 3.4* 2.6*   APTT 56.4* 58.3* 49.3*     LDH:  Recent Labs   Lab 04/21/25  0305 04/22/25  0315    401*     Microbiology:  Microbiology Results (last 7 days)       Procedure Component Value Units Date/Time    Aerobic culture [[id number]] Collected: 04/21/25 0942    Order Status: Completed Specimen: Wound from Abdomen Updated: 04/22/25 0829     CULTURE, AEROBIC No Growth To Date            I have reviewed all pertinent labs within the past 24 hours.    Estimated Creatinine Clearance: 57.6 mL/min (A) (based on SCr of 1.8 mg/dL (H)).        Assessment and Plan:     58 yo black male with stage D HFrEF s/p HM2 (implanted 3/8/2018 as DT-VAD) but required pump exchange (HM3 pump exchange 7/13/2022) who presents to the ED after getting shocked 17 times by his device. In the ED he was in VT and was started on amiodarone. When he was receiving IV metoprolol his VT rate went from 130 to 180 to 240 to VF and then he was shocked. He was awake and otherwise felt fine. He is not sure what may have caused this; he has been compliant with medications. He was recently switched from doxycycline to cefadroxil and 2 weeks ago was taken off of xanax.    He has a history of VT and is currently on amiodarone and mexiletine and metoprolol. He also has a history of amiodarone induced thyrotoxicosis. TSH today 4.7. He has a history of alcohol abuse but reports he has been sober for a few months. iSTAT labs showed K 3.5. He is volume overloaded on exam.    * Ventricular tachycardia  Now on lidocaine drip  Counseled on alcohol cessation  S-icd shock turned off for now, he is undecided on if he wants it back on.    amiodarone induced hypothyrodism  Endocrinology consulted appreciate recs.  He is essentially euthyroid and unlikely to contribute to arrhythmia  -Continue levothyroxine 125 mcg  daily  -Recheck TSH and FT4 in 3-4 weeks   -OK to continue amio from Endocrinology standpoint    LVAD (left ventricular assist device) present  Procedure: Device Interrogation Including analysis of device parameters  Current Settings: Ventricular Assist Device  Review of device function is stable/unstable stable        4/22/2025     6:01 AM 4/22/2025     5:01 AM 4/22/2025     4:01 AM 4/22/2025     3:01 AM 4/22/2025     2:01 AM 4/22/2025     1:01 AM 4/22/2025    12:01 AM   TXP LVAD INTERROGATIONS   Type HeartMate3 HeartMate3 HeartMate3 HeartMate3 HeartMate3 HeartMate3 HeartMate3   Flow 5.9 5.7 5.5 5.6 6 5.6 5.7   Speed 6300 6300 6300 6300 6300 6300 6300   PI 1.4 1.5 1.3 2.3 1.9 1.5 1.5   Power (Jackson) 5.6 5.5 5.4 4.9 5.9 5.3 5.6   LSL 5900 5900 5900 5900 5900 5900 5900   Pulsatility Intermittent pulse Intermittent pulse Intermittent pulse Intermittent pulse Intermittent pulse Intermittent pulse Intermittent pulse             Dayton Gomez MD  Heart Transplant  [facility]

## 2025-04-22 NOTE — PLAN OF CARE
CICU DAILY GOALS       A: Awake    RASS: Goal -    Actual - RASS (Newman Agitation-Sedation Scale): alert and calm   Restraint necessity:    B: Breath   SBT: Not intubated   C: Coordinate A & B, analgesics/sedatives   Pain: managed    SAT: Not intubated  D: Delirium   CAM-ICU:    E: Early(intubated/ Progressive (non-intubated) Mobility   MOVE Screen:  na   Activity: Activity Management: Sitting at edge of bed - L2  FAS: Feeding/Nutrition   Diet order: Diet/Nutrition Received: regular,   Fluid restriction:     Nutritional Supplement Intake: Quantity 1, Type: Boost  T: Thrombus   DVT prophylaxis: VTE Core Measure: Pharmacological prophylaxis initiated/maintained  H: HOB Elevation   Head of Bed (HOB) Positioning: HOB at 30 degrees  U: Ulcer Prophylaxis   GI: yes  G: Glucose control   managed    S: Skin   Bathing/Skin Care: linen changed (04/22/25 0701)  Wounds: No  Wound care consulted: No  B: Bowel Function   no issues   I: Indwelling Catheters   [place] necessity:     CVC necessity: No  D: De-escalation Antibx   No  Plan for the day   Oob, ambulate in room, wean lidocaine to off  Family/Goals of care/Code Status   Code Status: Full Code     No acute events throughout day, VS and assessment per flow sheet, patient progressing towards goals as tolerated, plan of care reviewed with Tim Richards and family, all concerns addressed, will continue to monitor.    Problem: Adult Inpatient Plan of Care  Goal: Plan of Care Review  Outcome: Progressing  Goal: Patient-Specific Goal (Individualized)  Outcome: Progressing  Goal: Absence of Hospital-Acquired Illness or Injury  Outcome: Progressing  Goal: Optimal Comfort and Wellbeing  Outcome: Progressing  Goal: Readiness for Transition of Care  Outcome: Progressing     Problem: Ventricular Assist Device  Goal: Optimal Adjustment to Device  Outcome: Progressing  Goal: Absence of Bleeding  Outcome: Progressing  Goal: Absence of Embolism Signs and Symptoms  Outcome:  Progressing  Goal: Optimal Blood Flow  Outcome: Progressing  Goal: Absence of Infection Signs and Symptoms  Outcome: Progressing  Goal: Effective Right-Sided Heart Function  Outcome: Progressing     Problem: Skin Injury Risk Increased  Goal: Skin Health and Integrity  Outcome: Progressing     Problem: Fall Injury Risk  Goal: Absence of Fall and Fall-Related Injury  Outcome: Progressing     Problem: Dysrhythmia  Goal: Normalized Cardiac Rhythm  Outcome: Progressing

## 2025-04-22 NOTE — ASSESSMENT & PLAN NOTE
59 year-old-man with stage D HFrEF s/p ICD (2014) on HM3 pump.. He also has recurrent VT as well as atrial flutter with RVR and is on chronic amiodarone. He was previously on Mexiletine but due to insurance issues, was stopped on 3/26/2025 and he was placed on Metoprolol low dose 25 mg. He is now presenting with VT and multiple shocks form his SICD. EP consulted for VT>      Recommendations:   - Continue amiodarone gtt.   - Wean off lidocaine gtt as patient tolerates from VT standpoint. Once off can restart mexiletine.   - Increase metoprolol dose if ok from HTS standpoint.   - Consult psych to see if patient can be restarted on xanax.   - Patient's subq ICD turned off currently, patient would like to keep it off. Will discuss with HTS to get there opinion as well.   - For full recommendations, please see staff attestation.

## 2025-04-22 NOTE — PROGRESS NOTES
04/22/2025  Fitz Christianson    Current provider:  Isaiah Holder, *    Device interrogation:      4/22/2025     6:01 AM 4/22/2025     5:01 AM 4/22/2025     4:01 AM 4/22/2025     3:01 AM 4/22/2025     2:01 AM 4/22/2025     1:01 AM 4/22/2025    12:01 AM   TXP LVAD INTERROGATIONS   Type HeartMate3 HeartMate3 HeartMate3 HeartMate3 HeartMate3 HeartMate3 HeartMate3   Flow 5.9 5.7 5.5 5.6 6 5.6 5.7   Speed 6300 6300 6300 6300 6300 6300 6300   PI 1.4 1.5 1.3 2.3 1.9 1.5 1.5   Power (Jackson) 5.6 5.5 5.4 4.9 5.9 5.3 5.6   LSL 5900 5900 5900 5900 5900 5900 5900   Pulsatility Intermittent pulse Intermittent pulse Intermittent pulse Intermittent pulse Intermittent pulse Intermittent pulse Intermittent pulse          Rounded on Tim Richards to ensure all mechanical assist device settings (IABP or VAD) were appropriate and all parameters were within limits.  I was able to ensure all back up equipment was present, the staff had no issues, and the Perfusion Department daily rounding was complete.      For implantable VADs: Interrogation of Ventricular assist device was performed with analysis of device parameters and review of device function. I have personally reviewed the interrogation findings and agree with findings as stated.     In emergency, the nursing units have been notified to contact the perfusion department either by:  Calling [phone] from 630am to 4pm Mon thru Fri, utilizing the On-Call Finder functionality of Epic and searching for Perfusion, or by contacting the hospital  from 4pm to 630am and on weekends and asking to speak with the perfusionist on call.    10:59 AM

## 2025-04-22 NOTE — MEDICAL/APP STUDENT
"Incomplete         INITIAL VISIT: ADDICTION PSYCHIATRY CONSULTATION SERVICE        ASSESSMENT AND PLAN:      HPI: Mr Richards is a 58yo M with past psych hx AUD, depression, anxiety, and PMHx HFrEF s/p ICD implanted in 2014 presenting with cc IED discharge and was admitted to hospital for persistent VT + ICD discharge.    Addiction psychiatry consulted for "aversion therapy"    Upon initiation of interview, patient was in bed, pleasant, cooperative, and happy to participate in the interview. Patient's wife Kelly was at bedside. Patient stated that he "needs help with my alcohol cravings" and wants to start antabuse. He states he has never tried it before and heard it can help with alcohol use disorder. Patient counseled that antabuse does not necessarily help with cravings, but patient states he wants "punishment" for his drinking. Patient endorsed multiple stressors/triggers including arguments with wife, financial issues, and any changes in mood lead him to drinking in order to "forget." Patient denied history of rehab, AA, or previous MAT.     Patient's last drink was last Saturday, reports drinking 3x 16oz cups of rum (32oz but diluted 1:1 with water). Patient stated he has not had withdrawal symptoms before, but wife at bedside states that after a previous 3 day hospital admission, upon returning home patient began to have "shakes, agitation, confusion," patient denied.    Patient quit job and went on disability in 2019, reports feeling bored and melancholy since; also endorses anxiety and worrying about being shocked by his ICD, his finances, and his relationship.     Patient is currently on Remeron 45mg PO qHS which was prescribed ~2yrs ago for sleep. Patient states it does not seem to help with his sleep. Wife asked about potential trial of "antidepressants". Educated pt + wife on side effects and MOA of SSRI/SNRIs, stated would discuss with psych team. Patient stated he would be open to and willing to try an " antidepressant trial.        REVIEW OF SYSTEMS:  I[x]I Patient denies any pertinent ROS, and none is known.  I[]I Patient unable or unwilling to provide any ROS.     [] Y  [x] N  sleep disturbance: **   [] Y  [x] N  appetite/weight change: **   [x] Y  [] N  fatigue/anergia: ** Globally: endorsed but not observed   [] Y  [x] N  impairment in focus/concentration: **      [x] Y  [] N  depression: ** Globally: endorsed but not observed, nearly every day Positive for: intermittent sadness, anhedonia, amotivation Negative for: excessive or inappropriate guilt, hopelessness, tearfulness  [x] Y  [] N  anxiety/worry: ** Globally: endorsed but not observed, moderate, nearly every day, persistent Positive for: more worry than the average person, ruminations Negative for: panic attacks, agoraphobia, social anxiety, phobic behavior, separation anxiety, selective mutism  [] Y  [x] N  dysregulated mood/behavior: **   [] Y  [x] N  manic symptomatology: **   [] Y  [x] N  psychosis: **         CURRENT PSYCHOTROPIC REGIMEN:  I[x]I Y  I[]I N  I[]I U    Mirtazapine 45mg PO qHS        ADDICTION:      I[]I Y  I[]I N  I[]I U  I[]I Current  I[x]I Former  Nicotine Use: quit 5 years ago, started at age 17, reports 2-3 packs a week  I[x]I Y  I[]I N  I[]I U  I[x]I Current  I[]I Former  Alcohol Use:   I[x]I Y  I[]I N  I[]I U  I[x]I Current  I[]I Former  Alcohol Misuse/Abuse:   I[]I Y  I[x]I N  I[]I U  I[]I Current  I[]I Former  Illicit Drug Use/Misuse/Abuse:   I[]I Y  I[x]I N  I[]I U  I[]I Current  I[]I Former  Misuse/Abuse of Rx Medications:   I[x]I Cannabis  I[]I Cocaine  I[]I Heroin  I[]I Meth  I[]I Opioids  I[]I Stimulants  I[]I Benzos  I[]I Other:    Patient has tried cannabis, did not like it    I[]I N/A  I[]I U  Substance(s) of Choice: Alcohol  I[]I N/A  I[]I U  Substance(s) Used Currently/Recently: Alcohol  I[]I N/A  I[]I U  Alcohol Consumption: exceeds recommended healthy limits, excessive, heavy 3x  "16oz cups of rum  I[]I N/A  I[]I U  Last Drink: Last Saturday  I[x]I N/A  I[]I U  Last Drug Use:   I[x]I N/A  I[]I U  Duration of Sobriety/Abstinence:      I[x]I Y  I[]I N  I[]I U  Hx of Detox:   I[]I Y  I[x]I N  I[]I U  Hx of Rehab:   I[]I Y  I[x]I N  I[]I U  Hx of IVDU:   I[]I Y  I[x]I N  I[]I U  Hx of Accidental Overdose:   I[]I Y  I[x]I N  I[]I U  Hx of DUI:   I[x]I Y  I[]I N  I[]I U  Hx of Complicated Withdrawal (i.e. Seizures and/or Delirium Tremens):   I[]I Y  I[x]I N  I[]I U  Hx of Known/Suspected Substance-Induced Psychiatric Disorder:   I[]I Y  I[x]I N  I[]I U  Hx of Medication Assisted Treatment:   I[]I Y  I[x]I N  I[]I U  Hx of Twelve Step Program (or Equivalent) Involvement:   I[]I Y  I[x]I N  I[]I U  Currently Exhibits Signs of Intoxication:   I[]I Y  I[x]I N  I[]I U  Currently Exhibits Signs of Withdrawal:   I[]I Y  I[x]I N  I[]I U  Currently Active in Recovery:   I[x]I Y  I[]I N  I[]I U  Social Support:   I[]I Y  I[x]I N  I[]I U  Spouse/Partner Consumption:      I[]I N/A  I[x]I Y  I[]I N  I[]I U  Acknowledges/Accepts Addiction:   I[]I N/A  I[x]I Y  I[]I N  I[]I U  Advised to Quit/Cut Back:   I[]I N/A  I[x]I Y  I[]I N  I[]I U  Alcohol/Drug Cessation ("Wants to Quit"): Interested in Quitting  I[]I N/A  I[x]I Y  I[]I N  I[]I U  Motivation to Pursue Treatment: High, wishes to start MAT  I[x]I N/A  I[]I Y  I[]I N  I[]I U  Tobacco Cessation ("Wants to Quit"): {Cessation Counselin}        HISTORY:      I[]I Patient denies any history, and none is known.  I[]I Patient unable or unwilling to provide any history.     I[x]I Y  I[]I N  I[]I U  Psychiatric Diagnoses: ALONZO, depression (unspecified)  I[x]I Y  I[]I N  I[]I U  Current Psychiatric Provider (if Applicable): Osmar Mejia PA-C  I[]I Y  I[x]I N  I[]I U  Hx of Psychiatric Hospitalization:   I[x]I Y  I[]I N  I[]I U  Hx of Outpatient Psychiatric Treatment (psychiatry/psychotherapy):   I[x]I Y  I[]I N  I[]I U  Psychotropic Trials: Remeron 45mg PO " "qHS  I[]I Y  I[x]I N  I[]I U  Prior Suicide Attempts:   I[]I Y  I[x]I N  I[]I U  Hx of Suicidal Ideation:   I[]I Y  I[x]I N  I[]I U  Hx of Homicidal Ideation:   I[]I Y  I[x]I N  I[]I U  Hx of Self-Injurious Behavior (Non-Suicidal):   I[]I Y  I[x]I N  I[]I U  Hx of Violence:   I[]I Y  I[x]I N  I[]I U  Documented Hx of Malingering:      FAMILY HISTORY:  I[]I Y  I[]I N  I[]I U    ***     I[]I Y  I[x]I N  I[]I U  Hx of Trauma/Neglect:   I[]I Y  I[x]I N  I[]I U  Hx of Physical Abuse:   I[]I Y  I[x]I N  I[]I U  Hx of Sexual Abuse:   I[]I Y  I[]I N  I[]I U  Happy Childhood:   I[]I Y  I[x]I N  I[]I U  Significant Developmental Delay/Disability:   I[x]I Y  I[]I N  I[]I U  GED/High School Diploma or Beyond:   I[]I Y  I[x]I N  I[]I U  Currently Employed:   I[x]I Y  I[]I N  I[]I U  On or Applying for Disability:   I[x]I Y  I[]I N  I[]I U  Functions Independently:   I[]I Y  I[]I N  I[]I U  Financially Stable:   I[x]I Y  I[]I N  I[]I U  Domiciled:   I[]I Y  I[x]I N  I[]I U  Lives Alone:   I[x]I Y  I[]I N  I[]I U  Intact Support System:   I[x]I Y  I[]I N  I[]I U  Heterosexual/Cisgender:   I[x]I Y  I[]I N  I[]I U  Currently in a Romantic Relationship:   I[x]I Y  I[]I N  I[]I U  Ever :   I[]I Y  I[]I N  I[]I U  Children/Dependents:   I[]I Y  I[]I N  I[]I U  Episcopalian/Spiritual:   I[]I Y  I[x]I N  I[]I U   History:   I[]I Y  I[x]I N  I[]I U  Current Legal Issues:   I[]I Y  I[]I N  I[]I U  Past Charges/Convictions:   I[]I Y  I[x]I N  I[]I U  Hx of Incarceration:   I[]I Y  I[]I N  I[]I U  Access to a Gun?: ***  {XX-GunSafety:[id number]::"NOTE: patient counseled on gun safety, including safe storage.","NOTE: patient counseled on inherent risks associated with gun ownership."}     I[]I Y  I[x]I N  I[]I U  Hx of Seizure:   I[]I Y  I[x]I N  I[]I U  Hx of Significant Head Injury (e.g., Loss of Consciousness, Concussion, Coma):   I[x]I Y  I[]I N  I[]I U  Medical History & Diagnoses: HFrEF s/p ICD/LVAD        Allergies:  ***      " "  EXAMINATION:      Vitals:    04/22/25 0919   BP:    Pulse:    Resp: 18   Temp:          MENTAL STATUS EXAMINATION:  General Appearance: ** appears stated age, well developed and nourished, adequately groomed and appropriately dressed, in no acute distress, dressed in hospital garb, lying in bed  Behavior: ** normal; cooperative; reasonably friendly, pleasant, and polite; appropriate eye-contact; under good behavioral control  Involuntary Movements and Motor Activity: **  no abnormal involuntary movements noted, no psychomotor agitation or retardation  Gait and Station: **   unable to assess - patient lying down or seated  Speech and Language: **  intact; normal rate, rhythm, volume, tone, and pitch; conversational, spontaneous, and coherent; speaks and understands English proficiently and fluently; repeats words and phrases, no word finding difficulties are noted  Mood: "okay"  Affect: **  normal, euthymic, reactive, full-range, mood-congruent, appropriate to situation and context  Thought Process and Associations: **  intact; linear, goal-directed, organized, and logical; no loosening of associations noted  Thought Content and Perceptions: **  no suicidal or homicidal ideation, no auditory or visual hallucinations, no paranoid ideation, no ideas of reference, no evidence of delusions or psychosis  Sensorium: **  intact; alert with clear sensorium; oriented fully to person, place, time and situation  Recent and Remote Memory: **  grossly intact, no significant impairments noted  Attention and Concentration: **  grossly intact, attentive to the conversation and not readily distractible  Fund of Knowledge: **  grossly intact, used appropriate vocabulary, no significant deficits noted  Insight: **  intact, demonstrates awareness of illness  Judgment: **  intact, behavior is adequate/appropriate given the circumstances        RISK MANAGEMENT:      I[]I Y  I[x]I N  I[]I U  I[]I A  Suicidal Ideation/Behavior: **   I[]I " Y  I[x]I N  I[]I U  I[]I A  Homicidal Ideation/Behavior: **  I[]I Y  I[x]I N  I[]I U  I[]I A  Violence: **  I[]I Y  I[x]I N  I[]I U  I[]I A  Self-Injurious Behavior: **     The patient is deemed to be a reliable and factually accurate historian, with no evidence of delirium.     I[]I Y  I[x]I N  I[]I U  I[]I A  I[]I N/A  Minimization of Risk Parameters Suspected/Evident: **  I[]I Y  I[x]I N  I[]I U  I[]I A  I[]I N/A  Exaggeration of Risk Parameters Suspected/Evident: **     [] Y  [x] N  Danger to Self:   [] Y  [x] N  Danger to Others:   [] Y  [x] N  Grave Disability:           Assessment/Plan  Alcohol Use Disorder  Anxiety Disorder, unspecified  Depression, unspecified

## 2025-04-22 NOTE — EICU
Intervention Initiated From:  COR / LUIS MANUELU    Esther intervened regarding:  Rounding (Video assessment)    VICU Night Rounds Checklist  24H Vital Sign Range:  Temp:  [97.8 °F (36.6 °C)-98.3 °F (36.8 °C)]   Pulse:  []   Resp:  [10-59]   BP: ()/(0-84)   SpO2:  [82 %-100 %]     VICU Night Rounds Checklist  24H Vital Sign Range:  Temp:  [97.8 °F (36.6 °C)-98.3 °F (36.8 °C)]   Pulse:  []   Resp:  [10-59]   BP: ()/(0-84)   SpO2:  [82 %-100 %]     Video rounds and LDA reconciliation

## 2025-04-22 NOTE — SUBJECTIVE & OBJECTIVE
Interval History: NAEO. No VT all of yesterday. Patient feels well and has no new complaints. Lidocaine titrated down to 1 mg/min with plans to switch it to mexiletine tonight. He has not ambulated yet.    Continuous Infusions:   LIDOcaine  1 mg/min Intravenous Continuous 7.5 mL/hr at 04/22/25 0911 1 mg/min at 04/22/25 0911     Scheduled Meds:   amiodarone  400 mg Oral BID    amLODIPine  10 mg Oral Daily    cefadroxil  500 mg Oral Q12H    levothyroxine  125 mcg Oral Before breakfast    LIDOcaine (cardiac)  100 mg Intravenous Once    magnesium oxide  400 mg Oral Daily    metoprolol succinate  12.5 mg Oral BID    mirtazapine  45 mg Oral QHS    mupirocin   Nasal BID    omega-3 acid ethyl esters  2 g Oral BID    pantoprazole  40 mg Oral Daily with lunch    warfarin  5 mg Oral Daily     PRN Meds:  Current Facility-Administered Medications:     lorazepam, 0.5 mg, Intravenous, Q15 Min PRN    LORazepam, 0.5 mg, Oral, Q4H PRN    melatonin, 6 mg, Oral, Nightly PRN    oxyCODONE-acetaminophen, 1 tablet, Oral, Q6H PRN    Review of patient's allergies indicates:   Allergen Reactions    Lisinopril Anaphylaxis    Hydralazine     Hydralazine analogues      Chronic constipation, impotence, dizziness     Objective:     Vital Signs (Most Recent):  Temp: 97.6 °F (36.4 °C) (04/22/25 0301)  Pulse: (!) 59 (04/22/25 0855)  Resp: 18 (04/22/25 0919)  BP: 123/89 (04/22/25 0601)  SpO2: 95 % (04/22/25 0855) Vital Signs (24h Range):  Temp:  [97.6 °F (36.4 °C)-98.3 °F (36.8 °C)] 97.6 °F (36.4 °C)  Pulse:  [54-61] 59  Resp:  [11-27] 18  SpO2:  [94 %-100 %] 95 %  BP: ()/(0-89) 123/89     Patient Vitals for the past 72 hrs (Last 3 readings):   Weight   04/22/25 0501 108 kg (238 lb 1.6 oz)   04/21/25 0905 109 kg (240 lb 4.8 oz)   04/20/25 1109 109.5 kg (241 lb 6.5 oz)     Body mass index is 31.41 kg/m².      Intake/Output Summary (Last 24 hours) at 4/22/2025 1045  Last data filed at 4/22/2025 0601  Gross per 24 hour   Intake 924.96 ml   Output  3050 ml   Net -2125.04 ml          Physical Exam  Constitutional:       Appearance: Normal appearance.   HENT:      Head: Normocephalic and atraumatic.      Nose: Nose normal.      Mouth/Throat:      Mouth: Mucous membranes are moist.   Eyes:      Pupils: Pupils are equal, round, and reactive to light.   Cardiovascular:      Rate and Rhythm: Normal rate and regular rhythm.      Pulses: Normal pulses.   Pulmonary:      Effort: No respiratory distress.   Abdominal:      General: There is no distension.      Palpations: Abdomen is soft.      Tenderness: There is no abdominal tenderness.   Musculoskeletal:         General: No signs of injury.   Skin:     General: Skin is warm and dry.   Neurological:      General: No focal deficit present.      Mental Status: He is alert and oriented to person, place, and time.   Psychiatric:         Mood and Affect: Mood normal.         Behavior: Behavior normal.            Significant Labs:  CBC:  Recent Labs   Lab 04/20/25  0941 04/21/25 0305 04/22/25 0315   WBC 3.47* 4.26 4.80   RBC 4.08* 4.48* 4.50*   HGB 11.4* 12.4* 12.5*   HCT 36.5* 40.2 40.5    223 205   MCV 90 90 90   MCH 27.9 27.7 27.8   MCHC 31.2* 30.8* 30.9*     BNP:  Recent Labs   Lab 04/20/25  0827   *     CMP:  Recent Labs   Lab 04/20/25  0827 04/20/25  1656 04/21/25  0305 04/21/25  1500 04/22/25 0315   CALCIUM 8.9 8.7 9.1 9.1 9.0   ALBUMIN 3.9 3.3* 3.2*  --   --     136 137 137 136   K 3.5 4.3 4.0 4.8 4.4   CO2 22* 25 24 29 27    100 101 101 101   BUN 24* 23* 23* 23* 25*   CREATININE 2.3* 2.1* 1.9* 2.0* 1.8*   ALKPHOS 113  --  92  --   --    ALT 19  --  14  --   --    AST 38  --  29  --   --    BILITOT 0.3  --  0.3  --   --       Coagulation:   Recent Labs   Lab 04/20/25  0941 04/21/25  0305 04/22/25  0315   INR 2.7* 3.4* 2.6*   APTT 56.4* 58.3* 49.3*     LDH:  Recent Labs   Lab 04/21/25  0305 04/22/25 0315    401*     Microbiology:  Microbiology Results (last 7 days)       Procedure  Component Value Units Date/Time    Aerobic culture [[id number]] Collected: 04/21/25 0942    Order Status: Completed Specimen: Wound from Abdomen Updated: 04/22/25 0829     CULTURE, AEROBIC No Growth To Date            I have reviewed all pertinent labs within the past 24 hours.    Estimated Creatinine Clearance: 57.6 mL/min (A) (based on SCr of 1.8 mg/dL (H)).

## 2025-04-22 NOTE — CONSULTS
"  [facility]   DEPARTMENT OF PSYCHIATRY     IDENTIFIERS & DEMOGRAPHICS:     SERVICE: Addiction  ENCOUNTER: initial    START TIME: 4/22/2025 12:09 PM    -- PATIENT IDENTIFIERS: Tim Richards  [id number]  1965  59 y.o.  male  -- LOCATION OF PATIENT: unit (med/surg)    -- PRESENT WITH PATIENT DURING SESSION: ALONE  -- SOURCES OF INFORMATION: PATIENT  -- ENCOUNTER PROVIDER: Jj Mehta MD        PRESENTATION:   History of Present Illness   **  OVERVIEW OF THE HPI:    60 yo w/ a past psych hx of anxiety and unspecified insomnia and a PMH significant for stage D HFrEF who was previously supported with a HM2 (implanted 3/8/2018 as DT-VAD) but required pump exchange (HM3 pump exchange 7/13/2022) for thrombosis of pump post COVID-19 PNA and AHRF. who presents to the ED after getting shocked 17 times by his device. Addiction psychiatry was consulted for "aversion therapy" and for resumption of alprazolam treatment for VT suppression.     SUBJECTIVE/CURRENT FINDINGS:    Per Benjamin Arteaga, night float resident "Patient seen at bedside where he appears comfortable. He is pleasant and engaged in conversation. He states that since his last hospitalization during which he was assessed by Addiction Psychiatry he has continued to drink. He reports that he has been drinking 3-5 days per week and consumes about 1/3 of a handle. He describes "PTSD-like" symptoms from being shocked in the past that makes it difficult for him to sleep as one of his reasons he drinks. He also states that since he has not been working he drinks out of boredom.     He does describe ongoing depression, anxiety, and irritability. He denies that he is currently taking any medications for depression/anxiety (he is on mirtazapine). Asked about what he meant by aversion therapy, he and his wife said they were referring to Antabuse. The options for MAT were discussed, and the patient expressed interested in treatment. At this time he is not " "interested in treatment via IOP/rehab at this time. "    Seen again by oscar on 4/22/2025:  Upon initiation of interview, patient was in bed, pleasant, cooperative, and happy to participate in the interview. Patient's wife Kelly was at bedside. Patient stated that he "needs help with my alcohol cravings" and wants to start antabuse. He states he has never tried it before and heard it can help with alcohol use disorder. Patient counseled that antabuse does not necessarily help with cravings, but patient states he wants "punishment" for his drinking. Patient endorsed multiple stressors/triggers including arguments with wife, financial issues, and any changes in mood lead him to drinking in order to "forget." Patient denied history of rehab, AA, or previous MAT.      Patient's last drink was last Saturday, reports drinking 3x 16oz cups of rum (32oz but diluted 1:1 with water). Patient stated he has not had withdrawal symptoms before, but wife at bedside states that after a previous 3 day hospital admission, upon returning home patient began to have "shakes, agitation, confusion," patient denied.     Patient quit job and went on disability in 2019, reports feeling bored and melancholy since; also endorses anxiety and worrying about being shocked by his ICD, his finances, and his relationship.      Patient is currently on Remeron 45mg PO qHS which was prescribed ~2yrs ago for sleep. Patient states it does not seem to help with his sleep. Wife asked about potential trial of "antidepressants". Educated pt + wife on side effects and MOA of SSRI/SNRIs, stated would discuss with psych team. Patient stated he would be open to and willing to try an antidepressant trial.     Discussed naltrexone for alcohol cravings. Patient stated he would rather start naltrexone over antebuse for AUD. Advised to be opioid free for 7 days prior to initiation.     Per Chart Review:    Regularly sees outpatient psychiatrist, most recent note on " 4/2025 had recommended to discontinue xanax due to concomitant alcohol use disorder.     REVIEW OF SYSTEMS:     Y   Sleep Disturbance/Disruption  +insomnia     N   Appetite/Weight Change   Y   Alterations in Energy Level  +fatigue/anergia     Y   Impaired Focus/Concentration  +diminished ability to think     Y   Depressive Symptomatology   Y   Excessive Anxiety/Worry   N   Dysregulated Mood/Behavior   N   Manic Symptomatology   N   Psychosis   Y   Trauma-Related  difficulty sleeping at night due to worries/flashbacks of getting shocked    Regarding the current presentation, no other significant issues or complaints are voiced or known at this time.      ADD-ON PSYCHOTHERAPY:     N/A         HISTORY:   Patient Information   **  >> SOURCES: patient       PSYCH  SUBSTANCE  FAMILY  SOCIAL  MEDICAL     Y   Previous/Pre-Existing Psychiatric Diagnoses  anxiety, depression   Y   Past Psychotropic Trials  remeron, xanax, ambien, buspar, zoloft   Y   Current Psychiatric Provider  Osmar Mejia PA-C   Y   Hx of Outpatient Psychiatric Treatment   N   Hx of Psychiatric Hospitalization   N   Hx of Suicidal Ideation/Threats   N   Hx of Suicide Attempts/Gestures   N   Hx of Homicidal Ideation/Threats   N   Hx of Homicidal Behavior   N   Hx of Non-Suicidal Self-Injurious Behavior   N   Hx of Perpetrated Violence   N   Documented Hx of Malingering   N   Hx of Psychosis   N   Hx of Bipolar Diathesis   N   Hx of Depression   Y   Hx of Anxiety   Y   Hx of Insomnia   N   Hx of Delirium     N   Hx of Formal BASIL Treatment   Y   Recent Alcohol Consumption  1/3 of fifth of liquor   +   Drinking Pattern (frequency)  +daily   Y   Hx of Nicotine Use  Quit 6 years ago   Y   Hx of Alcohol Misuse/Abuse   N   Hx of Illicit Drug Misuse/Abuse   N   Hx of Prescription Drug Misuse/Abuse   +    Drug Experimentation/Usage  +kratom     N   Hx of IVDU   N   Hx of Accidental Overdose   Y   Hx of DUI   N   Hx of Complicated Withdrawal   N   Hx of Substance-Induced Disorder   N   Hx of Medication Assisted Treatment   N   Hx of Twelve Step Program (or similar)   N   Hx of Court-Ordered Treatment   N   Active in Recovery   Y   Aware of Biomedical Complications     Y   Family Psychiatric History  father and brother with AUD      U   Hx of Trauma   U   Hx of Abuse     N   Developmental Delay/Disability   Y   GED/High School Diploma   N   Post-Secondary Education   N   Currently Employed   Y   Currently on Disability   Y   Financially Stable   Y   Functions Independently   Y   Domiciled   Y   Intact Support System   Y   Heterosexual/Cisgender   Y   Currently in a Relationship   Y   Ever   once   Y   Currently    N   Ever /   Y   Children/Dependents   U   Cheondoism/Spiritual   Y   Hobbies/Recreational Activities   U   Hx of  Service     Y   Ever Charged/Convicted  DUI in 2010 which required intensive safe driving course   Y   Hx of Alcohol/Drug Offense   Y   Current Probation/[place]/Diversion   N   Hx of Incarceration     N   Hx of Seizures   N   Hx of Head Trauma     Y   Medical Hx & Diagnoses  Chronic combined systolic and diastolic congestive heart failure, LVAD present, VT, HTN, amiodarone induced hypothyroidism    Allergies  Lisinopril, Hydralazine   +   Key Diagnoses  +CVA, +CHF/arrythmia, +HTN, +thyroid disease      >> EHR ([employer]): reviewed/reconciled      The patient's past medical history has been reviewed and updated as appropriate within the electronic health record system.  See PROBLEM LIST & HISTORY for details.    Scheduled and PRN Medications: The electronic chart was reviewed and updated as  "appropriate.  See MEDCARD for details.    Allergies:  Lisinopril, Hydralazine, and Hydralazine analogues      EXAMINATION:   Objective   **  VITALS:  /79   Pulse (!) 58   Temp 97.4 °F (36.3 °C) (Oral)   Resp (!) 21   Ht 6' 1" (1.854 m)   Wt 108 kg (238 lb 1.6 oz)   SpO2 (!) 78%   BMI 31.41 kg/m²     MENTAL STATUS EXAMINATION:  Appearance: appears stated age  adequately groomed, in no apparent distress    Behavior & Attitude: participative, under good behavioral control, able to redirect, appropriate eye contact  calm, engaged, agreeable, cooperative    Movements & Motor Activity: no psychomotor agitation, no tremor    Speech & Language: normal rate, normal volume, normal quantity, normal latency, spontaneous, reciprocal, fluent    Mood: fine/good  Affect: reactive, full range  appropriate given the situation/context, mood congruent    Thought Process & Associations: linear, goal-directed, organized, logical, coherent, relevant    Thought Content & Perceptions: no paranoid ideation, no hallucinations, no responding to internal stimuli    Sensorium: awake, alert    Orientation: grossly intact, oriented to person, oriented to place, oriented to time, oriented to situation    Recent & Remote Memory: intact (recent), intact (remote)    Attention & Concentration: intact  attentive to conversation, not easily distracted    Fund of Knowledge: intact, vocabulary proficient    Insight: intact, good  demonstrates sufficient awareness of condition/situation    Judgment: intact, good  heeds instructions/advice        RISK & REGULATORY:   Impression   **   RISK PARAMETERS:     N   Suicidal Ideation/Threats   N   Suicide Attempts/Gestures   N   Homicidal Ideation/Threats   N   Homicidal Behavior   N   Non-Suicidal Self-Injurious Behavior   N   Perpetrated Violence      LEGAL (current status  certification criteria):     > None - N/A     - DANGER TO SELF: no   - DANGER TO " OTHERS: no   - GRAVELY DISABLED: no    NOTE: RISK PARAMETERS are current to the encounter/episode  NOT inclusive of past history.       SAFETY SCREENINGS:    -- PROTECTIVE FACTORS: IDENTIFIED       - SPECIFIC FACTORS IDENTIFIED: loving attachments, trusted provider    -- RISK FACTORS: IDENTIFIED     - SPECIFIC MODIFIABLE FACTORS IDENTIFIED: psychiatric sx, intoxication/use    -- RISK MITIGATION & PREVENTION:      - INTERVENTIONS: advice/counseling, harm reduction     MEDICAL DECISION MAKING:     - DIAGNOSTICS: a diagnostic psychiatric evaluation was performed and responsiveness to treatment was assessed  ability/capacity to respond to treatment: adequate/intact     REGULATORY:    -- : REVIEWED  no discrepancies or irregularities are noted      INFORMED CONSENT & SHARED DECISION MAKING are the hallmark and bedrock of good clinical care, and as such have been employed and obtained, respectively, to the degree possible.  Discussed, to the extent possible, diagnosis, risks and benefits of proposed treatment (e.g., medication, therapy) vs alternative treatments vs no treatment, potential side effects of these treatments, and the inherent unpredictability of treatment.  Resources have been provided via the patient instructions in the AVS to supplement, augment, and reinforce discussions, counseling, and/or interventions.       - ABILITY TO UNDERSTAND, PARTICIPATE & ENGAGE: adequate     - AGREEABLE TO TREATMENT (consent/assent): the patient consents to treatment     - RELIABILITY/ACCURACY: the patient is deemed to be a reliable and factually accurate historian      WARNINGS & PRECAUTIONS:  >> In cases of emergencies (e.g. SI/HI resulting in danger to self or others, functioning deteriorating to the level of grave disability), call 911 or [phone], or present to the emergency department for immediate assistance.    >> Individuals should not operate a motor vehicle or heavy machinery if effects of medications or underlying  "symptoms/condition impair the ability to do so safely.    >> FULLY comply with ANY/ALL medication as prescribed/instructed and report ANY/ALL suspected adverse effects to appropriate health care providers.      ASSESSMENT & PLAN:   Assessment & Plan   **  DIAGNOSES & PROBLEMS:       1.  Alcohol use disorder, severe    2.  Unspecified anxiety disorder    3.  Unspecified depressive disorder    PSYCHOTROPIC REGIMEN:   (C)=Continue as prescribed  (A)=Adjust as noted  (I)=Iniitate  (D)=Discontinue      1.  Remeron 45 mg qhs (C)    -- ASSESSMENT (synthesis  analysis):     58 yo w/ a past psych hx of anxiety and unspecified insomnia and a PMH significant for stage D HFrEF who was previously supported with a HM2 (implanted 3/8/2018 as DT-VAD) but required pump exchange (HM3 pump exchange 7/13/2022) for thrombosis of pump post COVID-19 PNA and AHRF. who presents to the ED after getting shocked 17 times by his device. Addiction psychiatry was consulted for "aversion therapy" and for resumption of alprazolam treatment for VT suppression.     On interview, patient acknowledges addiction and is interested in MAT therapy for AUD. Discussed variety of options for him. While interested in antebuse, he would benefit from naltrexone for alcohol cravings once opioid free for 1 week. Not currently in active withdrawal at this time. Likely out the window for major complications of alcohol withdrawal.     -- PLAN (goals  recommentations):     - would not recommend restarting xanax at this time due to alcohol use disorder  - would benefit from trial of naltrexone, however patient would need to be opioid free for 7 days. Defer to his outpatient psychaitrist  - would benefit from IOP/inpatient rehab but patient is not interested at this time. Will place resources in discharge tab.   - would benefit for starting CBT therapy for anxiety related to health concerns. Recommend case management for assistance in referrals for therapy.   - " continue symptom driven alcohol withdrawal protocol.   - will sign off at this time. Thank you.          - WITH REASONABLE MEDICAL CERTAINTY, based on history, chart review, available collateral information, and a present-state examination:    * treatment is VOLUNTARY - no formal legal status is indicated     >> attended to therapeutic alliance, via the building and maintenance of rapport and trust  >> risk mitigation strategies and safety netting were employed, explored, and reinforced  >> psychotherapy was provided during the session  -- DISCHARGE ###  >> upon discharge, it is recommended to follow up with an outpatient provider within 1-2 weeks of discharge, but ideally as soon as possible  >> patient should contact their current outpatient provider expeditiously after discharge to apprise them of the situation and receive further instructions  -- REFERRAL ###  >> contingent on accessibility and willingness, patient would benefit from the following referrals/services: psychotherapy/counseling, mutual self-help groups (e.g. Alcoholics Anonymous, [employer]), and cognitive behavioral therapy  > ADDICTION ###  >> advised to minimize/avoid alcohol intake and abstain from illicit drug use  >> advised to abstain from alcohol and illicit drug use  >> counseled on full abstinence from alcohol and substances of abuse (illicit and prescription), as well as maintenance of a recovery lifestyle  >> harm reduction techniques discussed, as warranted, to mitigate risk from problematic behaviors  >> serial laboratory testing (e.g. PETH, serum ethanol, urine toxicology) recommended and/or planned to provide accountability, as well as guide and refine treatment moving forward  >> importance of lifelong, active engagement in 12 step (or equivalent) mutual self-help program(s) was stressed/reinforced, including regular meeting attendance and acquisition/maintenance of a sponsor  >> education and/or resources discussed and/or  provided for various addiction recovery and rehabilitation options  >> motivational interviewing applied, relapse prevention provided  >> successfully complete a licensed accredited addiction rehab program and follow all aftercare recommendations  -- ATTEST ###  >> case discussed with staff psychiatrist  >> will sign off at this time, thank you    See below for pertinent laboratory and radiographic findings.      CHART REVIEW: available documentation has been reviewed, and pertinent elements of the chart have been incorporated into this evaluation where appropriate.       KEY & LINKS:        Y  = yes/endorses     N  = no/denies     U  = unknown/unable to assess    ADHD   AIMS   AUDIT   AUDIT-C   C-SSRS (Screen)   C-SSRS (Short)   C-SSRS (Full)   DAST   DAST-10   ALONZO-7   MoCA   PCL-5   PHQ-9   BASIL   YMRS      PSYCHIATRIC SCREENINGS:       DIAGNOSTIC TESTING:   Results   **   Glu 103  3/8/2025  Li *   *  TSH 4.710 (H)  4/20/2025    HgA1c *   *  VPA *   *   FT4 1.35  4/20/2025    Na 136  4/22/2025  CLZ *   *  WBC 4.80  4/22/2025    Cr 1.8 (H)  4/22/2025  ANC 2.0; 47.9;   3/8/2025   Hgb 12.5 (L)  4/22/2025     BUN 25 (H)  4/22/2025  Trop I 0.082 (H)  4/7/2023  HCT 40.5  4/22/2025     GFR 43 (L)  4/22/2025   CPK 57  9/26/2022    4/22/2025     Alb 3.2 (L)  4/21/2025   PRL *   *  B12 655  3/6/2025     T Bili 0.3  4/21/2025  Chol 245 (H)  11/6/2024  B9 6.6  3/6/2025    ALP 92  4/21/2025  TGs 75  11/6/2024  B1 *   *    AST 29  4/21/2025   (H)  11/6/2024  Vit D *   *     ALT 14  4/21/2025  LDL 95.0  11/6/2024  HIV Reactive (A)  4/20/2025     INR 2.6 (H)  4/22/2025  Carmencita 140 (H)  8/23/2022   Hep C Non-Reactive  4/20/2025    GGT *   *  Lip 85 (H)  8/23/2022  RPR *   *    MCV 90  4/22/2025   NH4 *   *  UPT *   *      PETH *   *  THC *   *    ETOH 84 (H)  4/20/2025  ROLANDO *   *    EtG *   *  AMP *   *    ALC *   *  OPI *   *    BZO *   *  MTD *   *     BAR  *   *  BUP *   *    PCP *   *  FEN *   *     Results for orders placed or performed in visit on 04/20/25   EKG 12-lead    Collection Time: 04/20/25  8:21 AM   Result Value Ref Range    QRS Duration 180 ms    OHS QTC Calculation 752 ms    Narrative    Test Reason :    Vent. Rate : 144 BPM     Atrial Rate : 144 BPM     P-R Int : 104 ms          QRS Dur : 180 ms      QT Int : 486 ms       P-R-T Axes :     37  49 degrees    QTcB Int : 752 ms    WCT and alot of artifact  Nonspecific intraventricular block  Abnormal ECG  When compared with ECG of 17-Apr-2024 10:07,  Significant change has occurred  Vent. rate has increased by  64 bpm  Confirmed by Edmond Powell ([id number]) on 4/20/2025 9:20:26 AM    Referred By:            Confirmed By: Edmond Powell     Problem List[1]     Inpatient consult to Psychiatry  Consult performed by: Jj Mehta MD  Consult ordered by: Dayton Gomez MD Summer Mostafa, DO  [facility], PGY-2    [facility] CARDIAC ICU           [1]   Patient Active Problem List  Diagnosis    Cigarette nicotine dependence without complication    Alcohol use disorder    Pulmonary nodule seen on imaging study    Combined systolic and diastolic cardiac dysfunction    End stage heart failure    Palliative care encounter    Hyperbilirubinemia    Anemia    Hypertension    LVAD (left ventricular assist device) present    GIB (gastrointestinal bleeding)    Thrombocytopenia, unspecified    GI bleed    amiodarone induced hypothyrodism    Ventricular tachycardia    Amiodarone-induced hyperthyroidism    CHICHI (obstructive sleep apnea)    Post traumatic stress disorder (PTSD)    History of ventricular tachycardia    Atrial flutter    Depression with anxiety    Stage 3b chronic kidney disease    Hypertensive cardiovascular-renal disease, stage 1-4 or unspecified chronic kidney disease, with heart failure    High risk medications (not anticoagulants) long-term use    Dilated cardiomyopathy     Anticoagulation monitoring, INR range 2-3    Impaired mobility    Left ventricular assist device (LVAD) complication, subsequent encounter    History of COVID-19    Hematoma of groin    Pseudoaneurysm of right femoral artery    Mycotic aneurysm    Postprocedural seroma of a circulatory system organ or structure following other circulatory system procedure    Other specified hypothyroidism    PVD (peripheral vascular disease)    Overweight with body mass index (BMI) of 28 to 28.9 in adult    Poor balance    Physical debility    Decreased functional activity tolerance    Decreased strength    Other acquired hemolytic anemias    Fall    Alcohol withdrawal, uncomplicated    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

## 2025-04-22 NOTE — PT/OT/SLP EVAL
Physical Therapy Evaluation and Discharge Note    OT present for coeval due to pt's multiple medical comorbidities and functional/cognition deficits requiring two skilled therapists to appropriately progress pt's musculoskeletal strength, neuromuscular control, and endurance while taking into consideration medical acuity and pt safety.    Patient Name:  Tim Richards   MRN:  [id number]    Recommendations:     Discharge Recommendations: No Therapy Indicated  Discharge Equipment Recommendations: none   Barriers to discharge: None    Assessment:     Tim Richards is a 59 y.o. male admitted with a medical diagnosis of Ventricular tachycardia. .  At this time, patient is functioning at their prior level of function and does not require further acute PT services.     Recent Surgery: * No surgery found *      Plan:     During this hospitalization, patient does not require further acute PT services.  Please re-consult if situation changes.      Subjective     Chief Complaint: none reported  Patient/Family Comments/goals: To go home  Pain/Comfort:  Pain Rating 1: 0/10  Pain Rating Post-Intervention 1: 0/10    Patients cultural, spiritual, Uatsdin conflicts given the current situation: no    Patient History:     Living Environment: Pt lives with spouse in 2 story split level home with 7 [address] and [address] HR. Bathroom: walk-in shower. Pt bed and bath located on 2nd floor with 12 stairs and L HR to access   Prior Level of Function: IND  DME owned: SPC, RW, SW, shower chair  Caregiver Assistance: spouse    Objective:     Communicated with RN prior to session.  Patient found sitting edge of bed with telemetry, LVAD, peripheral IV upon PT entry to room.    General Precautions: Standard, fall, LVAD    Orthopedic Precautions:N/A   Braces: N/A  Respiratory Status: Room air    Exams:  Gross Motor Coordination:  WFL  RLE ROM: WFL  RLE Strength: WFL  LLE ROM: WFL  LLE Strength: WFL    Functional Mobility:    Bed Mobility:   Sit >  Supine: Independent    Transfers:   Sit <> Stand Transfer: independence from EOB without AD    Balance:   Sitting balance: GOOD-: Incosistently Maintains balance through MODERATE excursions of active trunk movement,     Standing balance:   FAIR+: Takes MINIMAL challenges from all directions  GOOD+: Independent gait (with or without assistive device)                 Gait:  Distance: ~25 ft in the room   Assistive Device: no AD  Assistance Level: independence  Gait Assessment: Pt amb with fair arminda and no LOB.  Gait distance limited to in the room due to needing to stay connected to code cart    AM-PAC 6 CLICK MOBILITY  Total Score:24       Treatment and Education:  Pt educated on tip to reduce fall risk and safety with mobility and using call button for assistance from nursing staff with OOB mobility.  Pt educated on sitting up in chair throughout most of day  Pt educated on amb 2-3x per day with assistance from staff and increased movement during hospital stay.  All questions answered within the scope of PT.  White board updated accordingly.    AM-PAC 6 CLICK MOBILITY  Total Score:24     Patient left HOB elevated with all lines intact, call button in reach, and RN present.    GOALS:   Multidisciplinary Problems       Physical Therapy Goals       Not on file                    DME Justifications:  No DME recommended requiring DME justifications    History:     Past Medical History:   Diagnosis Date    A-fib     Anticoagulant long-term use     Atrial flutter 7/30/2022    CHF (congestive heart failure)     Class 1 obesity due to excess calories with serious comorbidity and body mass index (BMI) of 31.0 to 31.9 in adult     Class 1 obesity due to excess calories with serious comorbidity and body mass index (BMI) of 32.0 to 32.9 in adult     Dilated cardiomyopathy 1/10/2018    Disorder of kidney and ureter     CKD    Encounter for blood transfusion     Essential hypertension 8/28/2022    Gout     HTN (hypertension)      Hx of psychiatric care     ICD (implantable cardioverter-defibrillator) infection 7/1/2020    Psychiatric problem     Thyroid disease     Ventricular tachycardia (paroxysmal)        Past Surgical History:   Procedure Laterality Date    AORTIC VALVULOPLASTY N/A 07/13/2022    Procedure: REPAIR, AORTIC VALVE;  Surgeon: Yg Kaufman MD;  Location: [facility] OR 2ND FLR;  Service: Cardiovascular;  Laterality: N/A;    APPLICATION OF WOUND VACUUM-ASSISTED CLOSURE DEVICE N/A 07/15/2022    Procedure: APPLICATION, WOUND VAC;  Surgeon: Yg Kaufman MD;  [facility];  Service: Cardiovascular;  Laterality: N/A;  50 x 5 cm     APPLICATION OF WOUND VACUUM-ASSISTED CLOSURE DEVICE Right 09/27/2022    Procedure: APPLICATION, WOUND VAC;  Surgeon: Kole Tabares MD;  [facility];  Service: Plastics;  Laterality: Right;    CARDIAC CATHETERIZATION  12/2012    CARDIAC DEFIBRILLATOR PLACEMENT Left     CRRT-D    CARDIAC VALVE REPLACEMENT  03/08/2018    LVAD Implant    COLONOSCOPY N/A 03/06/2018    Procedure: COLONOSCOPY;  Surgeon: Alonso Bone MD;  Location: [facility] ENDO (2ND FLR);  Service: Endoscopy;  Laterality: N/A;    COLONOSCOPY N/A 07/17/2019    Procedure: COLONOSCOPY;  Surgeon: Blane Valdez MD;  Location: [facility] ENDO (2ND FLR);  Service: Endoscopy;  Laterality: N/A;    COLONOSCOPY N/A 07/18/2019    Procedure: COLONOSCOPY;  Surgeon: Blane Valdez MD;  Location: [facility] ENDO (2ND FLR);  Service: Endoscopy;  Laterality: N/A;    CREATION OF ILIOFEMORAL ARTERY BYPASS Right 09/27/2022    Procedure: CREATION, BYPASS, ARTERIAL, ILIAC TO FEMORAL WITH GRAFT;  Surgeon: Zach Hernández MD;  [facility];  Service: Peripheral Vascular;  Laterality: Right;    CREATION OF MUSCLE ROTATIONAL FLAP Right 09/27/2022    Procedure: CREATION, FLAP, MUSCLE ROTATION, SARTORIUS AND RECTUS FEMORIS;  Surgeon: Kole Tabares MD;  [facility];  Service: Plastics;  Laterality: Right;     ESOPHAGOGASTRODUODENOSCOPY N/A 07/17/2019    Procedure: EGD (ESOPHAGOGASTRODUODENOSCOPY);  Surgeon: Blane Valdez MD;  Location: [facility] ENDO (2ND FLR);  Service: Endoscopy;  Laterality: N/A;    ESOPHAGOGASTRODUODENOSCOPY N/A 07/18/2019    Procedure: EGD (ESOPHAGOGASTRODUODENOSCOPY);  Surgeon: Blane Valdez MD;  Location: [facility] ENDO (2ND FLR);  Service: Endoscopy;  Laterality: N/A;    FOREIGN BODY REMOVAL N/A 07/22/2022    Procedure: REMOVAL, FOREIGN BODY;  Surgeon: Yg Kaufman MD;  Location: [facility] OR 2ND FLR;  Service: Cardiovascular;  Laterality: N/A;  LVAD Heartmate 2 drive line removal    INSERTION OF GRAFT TO PERICARDIUM N/A 07/15/2022    Procedure: INSERTION, GRAFT, PERICARDIUM;  Surgeon: Yg Kaufman MD;  Location: [facility] OR [facility] FLR;  Service: Cardiovascular;  Laterality: N/A;    IRRIGATION OF MEDIASTINUM N/A 07/15/2022    Procedure: IRRIGATION, MEDIASTINUM;  Surgeon: Yg Kaufman MD;  [facility];  Service: Cardiovascular;  Laterality: N/A;    LYSIS OF ADHESIONS  07/13/2022    Procedure: LYSIS, ADHESIONS;  Surgeon: Yg Kaufman MD;  [facility];  Service: Cardiovascular;;    NONINVASIVE CARDIAC ELECTROPHYSIOLOGY STUDY N/A 10/18/2019    Procedure: CARDIAC ELECTROPHYSIOLOGY STUDY, NONINVASIVE;  Surgeon: Raz Wagner MD;  Location: [facility] EP LAB;  Service: Cardiology;  Laterality: N/A;  VT, DFTs, MDT CRTD in situ, LVAD, LASHELL pizarro, 3098    REPLACEMENT OF IMPLANTABLE CARDIOVERTER-DEFIBRILLATOR (ICD) GENERATOR N/A 03/09/2020    Procedure: REPLACEMENT, ICD GENERATOR;  Surgeon: Harry Yun MD;  Location: [facility] EP LAB;  Service: Cardiology;  Laterality: N/A;  VT, ICD Gen Change and Lead Revision, MDT, MAC, DM,3 Prep    REPLACEMENT OF LEFT VENTRICULAR ASSIST DEVICE (LVAD)  07/13/2022    Procedure: REPLACEMENT, LVAD;  Surgeon: Yg Kaufman MD;  Location: [facility] OR [facility] FLR;  Service: Cardiovascular;;    REPLACEMENT OF PUMP N/A 07/13/2022    Procedure: REPLACEMENT, PUMP;  Surgeon: Yg  MD Shae;  Location: [facility] OR [facility] FLR;  Service: Cardiovascular;  Laterality: N/A;  LVAD pump exchange  EXPLANATION OF HEATMATE 2  IMPLANTATION OF HEARTMATE 3  IMPLANTATION OF 8MM CHIMNEY GRAFT TO RFA  INITIATION OF ECMO  TEMPORARY CLOSURE OF CHEST    REVISION OF IMPLANTABLE CARDIOVERTER-DEFIBRILLATOR (ICD) ELECTRODE LEAD PLACEMENT N/A 03/09/2020    Procedure: REVISION, INSERTION, ELECTRODE LEAD, ICD;  Surgeon: Harry Yun MD;  Location: [facility] EP LAB;  Service: Cardiology;  Laterality: N/A;  VT, ICD Gen Change and Lead Revision, MDT, MAC, DM,3 Prep    RIGHT HEART CATHETERIZATION Right 09/08/2023    Procedure: INSERTION, CATHETER, RIGHT HEART;  Surgeon: Gaurav Rodriguez MD;  Location: [facility] CATH LAB;  Service: Cardiology;  Laterality: Right;    STERNAL WOUND CLOSURE N/A 07/14/2022    Procedure: CLOSURE, WOUND, STERNUM;  Surgeon: Yg Kaufman MD;  Location: [facility] OR [facility] FLR;  Service: Cardiovascular;  Laterality: N/A;  temporary closure  evacuation of hematoma    STERNAL WOUND CLOSURE N/A 07/15/2022    Procedure: CLOSURE, WOUND, STERNUM;  Surgeon: Yg Kaufman MD;  Location: [facility] OR [facility] FLR;  Service: Cardiovascular;  Laterality: N/A;    TRACHEOSTOMY N/A 08/04/2022    Procedure: CREATION, TRACHEOSTOMY;  Surgeon: Germain Holt MD;  [facility];  Service: General;  Laterality: N/A;    TREATMENT OF CARDIAC ARRHYTHMIA  10/18/2019    Procedure: Cardioversion or Defibrillation;  Surgeon: Raz Wagner MD;  Location: [facility] EP LAB;  Service: Cardiology;;       Time Tracking:     PT Received On: 04/22/25  PT Start Time: 1350     PT Stop Time: 1418  PT Total Time (min): 28 min     Billable Minutes: Evaluation 10 and Gait Training 12      04/22/2025

## 2025-04-22 NOTE — EICU
Intervention Initiated From:  [facility] / EICU    Esther intervened regarding:  Rounding (Video assessment)    Virtual ICU Quality Rounds    Admit Date: 4/20/2025  Hospital Day: 2    ICU Day: 1d 22h    24H Vital Sign Range:  Temp:  [97.6 °F (36.4 °C)-98.3 °F (36.8 °C)]   Pulse:  [54-61]   Resp:  [11-27]   BP: ()/(0-89)   SpO2:  [94 %-100 %]     VICU Surveillance Screening    LDA reconciliation : Yes

## 2025-04-22 NOTE — ASSESSMENT & PLAN NOTE
Procedure: Device Interrogation Including analysis of device parameters  Current Settings: Ventricular Assist Device  Review of device function is stable/unstable stable        4/22/2025     6:01 AM 4/22/2025     5:01 AM 4/22/2025     4:01 AM 4/22/2025     3:01 AM 4/22/2025     2:01 AM 4/22/2025     1:01 AM 4/22/2025    12:01 AM   TXP LVAD INTERROGATIONS   Type HeartMate3 HeartMate3 HeartMate3 HeartMate3 HeartMate3 HeartMate3 HeartMate3   Flow 5.9 5.7 5.5 5.6 6 5.6 5.7   Speed 6300 6300 6300 6300 6300 6300 6300   PI 1.4 1.5 1.3 2.3 1.9 1.5 1.5   Power ([place]) 5.6 5.5 5.4 4.9 5.9 5.3 5.6   LSL 5900 5900 5900 5900 5900 5900 5900   Pulsatility Intermittent pulse Intermittent pulse Intermittent pulse Intermittent pulse Intermittent pulse Intermittent pulse Intermittent pulse

## 2025-04-22 NOTE — PROGRESS NOTES
Update     Informed by DASHA Romano, that pt was unable to get Mexiletine filled due to cost and will need assistance. Met with pt to inform that SW will assist with above and inquired about his preferred pharmacy. Pt stated that he uses [facility] on [address] ([phone]). S/w Edmund Clay, at [facility] regarding cost of med and informed that pt's copay was $300. SW was unable to find a PAP due to medication. SW was able to find a discount card and provided Harish Pharm Tech, at [facility] with card information. Harish informed SW that pt's medication was now $88.97, and he will have to order. Harish stated that he should have medication in tomorrow. Conveyed above information to pt and provided him with copy of discount card. Pt reports coping well and denies further needs, questions, concerns at this time and none indicated. Providing ongoing psychosocial and counseling support, education, resources, assistance and discharge planning as indicated. Also, informed medical team (Rosalina Fowler, and Bridget VAD Coordinator) of pt's copay with discount card. Following and available.

## 2025-04-22 NOTE — ASSESSMENT & PLAN NOTE
Now on lidocaine drip  Counseled on alcohol cessation  S-icd shock turned off for now, he is undecided on if he wants it back on.

## 2025-04-22 NOTE — PATIENT INSTRUCTIONS
REFERRAL RECOMMENDATIONS FOR SUBSTANCE ABUSE & MENTAL HEALTH      IN CASE OF SUICIDAL THINKING, call the National Suicide Hotline Number: 988    988 Suicide & Crisis Lifeline: 983 , 7-392-326-LZER (1097)  https://988Sphere Fluidics.Kayse Wireless       SUBSTANCE ABUSE:     [facility] (formerly known as the ABU)  [x] [phone], Option 2  [x] [facility] [facility] 4th Floor, [place] [address]  [x] [email or web address]-program  [x] The [facility] delivers comprehensive and collaborative treatment for alcohol and substance use disorders.  Excellent program for working professionals or anyone else seeking recovery.  [x] Requires insurance approval prior to starting program, call number above for more information.  [x] Intensive Outpatient Rehabilitation Program - M-F 9am-3pm - daily groups with psychologists and social workers, sessions with MDs 3x per week   [x] Ambulatory detox and dual diagnosis available      SUBOXONE:     NOTE: some Suboxone clinics require their clients to participate in a structured program (such as an IOP) in order to be prescribed Suboxone.  Some clinics have a long waiting list.  Most of these clinics do not accept walk-in clients, so call first to to learn what must be done to get started on Suboxone.    [facility] - [phone] (can do Sublocade)  [address] [facility] [place], [place] [address]    [address]  [phone]    [facility] - [phone] (can do Sublocade)  [address] [place] [address]    [facility]  [address] [place]  [phone]     [facility] (very short waiting list, may accept some walk-in's but call first if possible)  [address], [place] [address]  [phone]; [phone]    [facility]   [address] [place]    [phone]    [facility] (can usually be seen within a week but is cash only  for appointment)  [address] [facility]  [address] [place], [place]  [phone]    [facility] ([facility])  [address] [facility] [address]  [phone]    [place], [place]:    [facility] - [address] [place] [address] - Tel: [phone]    Raz Andrew - [address] [place] [address] - Tel: [phone]    Yariel Richardson - [address] [place] [address] - Tel: [phone]    Casper Acosta - [address] [place] [address] - Tel: [phone]    Karthik Terry - [address] [place] [address] - Tel: [phone]    [place], [place]:     Dr. Dneise Tran and Dr. Brandon Weathers - [address], [place] - Tel: [phone]    Dr. Jesica Lala - [address] [facility] [place], [place] - Tel: [phone]    Dr. Jaswant Hernandez - Tel: [phone]    Dr. Eloy Washington - [facility] - [phone]      METHADONE:     [facility] (the only methadone clinic in the [facility], has two locations)  [x] [place] - [address] [place] [address], [phone]  [x] [facility] [place] [place] [address], [phone]      12 STEP PROGRAMS (and similar):     Alcoholics Anonymous (local)  [x] [phone]  [x] www.[place].org for schedules for in-person and online meetings  [x] There are AA meetings throughout the day all over [facility]  [x] AA costs nothing to attend; they pass a basket for donations but this is not required    Narcotics Anonymous  [x] [phone]  [x] www.noana.org  [x] There are NA meetings throughout the day all over town  [x] NA costs nothing to attend; they pass a basket for donations but this is not required    Alcoholics Anonymous Online Intergroup (national)  [x] www.aa-intergroup.org  [x] Good resource for large, nation-wide meetings  [x] Can also attend smaller, local meetings in other cities  [x] Countless meetings all day and all night  [x] AA costs nothing to attend; they pass a basket for donations  but this is not required    Flying Sober - 24/7 zoom meetings for women and coed - sign on anytime, anywhere!  https://flyingSundance Research Institutesober.BetUknow/94-5-doqkkqhh/    Online Intergroup of AA - 121 Open AA Knott Meeting - 24/7 zoom meetings  https://aa-intergroup.org/meetings/    LOOKING FOR AN ALTERNATIVE TO 12 STEP PROGRAMS - check out:  SMART Recovery: https://www.smartrecovery.org/about-us  [facility]: https://recoverydharma.org      DETOX UNITS (USUALLY 5-7 DAYS):     [facility]: [address], [place]  [phone], call first to ensure bed availability    [facility]: [address] [facility] [address] [facility]  [phone], Option 1, call first to ensure bed availability    [facility] Recovery Center: [facility] Jimbo Shankar, [facility]  [phone] (intake by appointment only)    [facility]: [address], [facility]  [phone]      INTENSIVE OUTPATIENT PROGRAMS:     [facility] (formerly known as the ABU)  [x] [phone], Option 2  [x] [address] [facility] 4th Floor, [facility] [address]  [x] https://www.[facility].org/services/[facility]-recovery-program  [x] The [facility] delivers comprehensive and collaborative treatment for alcohol and substance use disorders.  Excellent program for working professionals or anyone else seeking recovery.  [x] Requires insurance approval prior to starting program, call number above for more information.  [x] Intensive Outpatient Rehabilitation Program - M-F 9am-3pm - daily groups with psychologists and social workers, sessions with MDs 3x per week   [x] Ambulatory detox and dual diagnosis available    [facility] Intensive [facility]patient Program  [x] [phone]  [x] [address] [facility] (the clinic not [facility])  [x] Call number above for more info and to check insurance requirements    [facility]  [address] [place] [address] [phone]    [facility]:  [address] [facility]  [address]?, [place], [place] [address]?, [phone]  [address] [facility]?, [place], [place] [address]?, [phone]    RESIDENTIAL REHABS (USUALLY 28 DAYS):     [facility]: [address], [phone]    [facility]: [address] , [facility]  [phone] (intake by appointment only)    [facility] (men only) [address], [place], [phone]    [facility] (Female only) [address] [place], [phone]    [facility]: [address], [place], men's program [phone], women's program [phone]    [facility]: [address], [place], [phone]    [facility]: [address], [place], [place], [phone]    [facility]: Men only, [phone], [address] [facility] Giovany Chaney [facility]: [address], [place], [place], [phone]    [facility]: [facility] [place], [place],  [phone]  New Location: [address] [place] [address] 100, [place] [address], [phone]    [facility]:   ?[address] [facility]. 36?[place], [place] [address]?[phone]    [place]: [address] [place] [address], [phone]    [place]: [place], [place], [phone]     [facility]: [place], [phone]    [facility]: [address] [place], [phone]    [facility]: [place], [place], [phone]    [place]: [place] [place], [phone]    [facility]: [address] [facility], [place] [address], [phone]    COMMUNITY ADDICTION CLINICS:     [facility]: [address] [place] [facility], [address] [place] [phone] -or- [address] [place] [address]    [facility] [place]: [address] [facility] [place]  [address]     [facility]: Clinics [phone]; Crisis [phone]    [facility]: [address] [place] [address]    [facility]: [address] [place] [address]    [facility]: [address] [place] [address],    [facility]: 2nd Floor [address] [facility] [place] [address]    [facility]: [address], [place] [address]    [facility], [address] [place], [place] [address]    [facility]: [address] [facility], [phone]  (serves youth 16-23 years old)    [facility]: [phone]    [facility]: [address] [facility], [phone]    [facility]: [address] [facility] [phone]    [facility]: [address] [place], [place] [address]810 [facility], [address], [phone]     [facility]: [address] [facility], [phone], [phone]    RESOURCES IN OTHER [facility]:     [facility]: [address] [facility] [facility], [phone], [phone]    [facility]: [address] [facility], [address], [phone]    [facility], [address] [place] [address], [phone]    [facility]: 384[address] [facility] [facility], [phone]    [facility], [address] [facility], [phone] ([facility])    [facility], [address] [facility], [phone] ([facility])    [facility] Health, [address], [place], [phone] ([facility], and [facility])    [facility], [address] , [place], [phone] ([facility])    [facility] Hotline [phone], [phone]    [facility], [address] [facility], [facility] Assessment [place], [address] [facility], [address], [facility], [address] [facility]  "Hiral [address] [facility], 500 [facility]. [address]., [place] [facility], [address], [place]    [facility], 401 [facility], [address] [phone]    [facility], [address] [facility] [phone]    [facility], [address] [facility] [phone]    [facility], [address] [facility], [phone]      Romansh SPEAKING (en español):     Información de la reunión de Alcohólicos Anónimos  [facility], 10:00 am  Habla español  Esta reunión está abierta y cualquiera puede asistir.    Icelandic speaking Alcoholics anonymous meetings:  El "Rory [place] AA Skype" es un rory on line de Alcohólicos Anónimos en [facility]. El rory es chani, gratuito y virtual a través de Skype Audio. El rory funciona mediante rosemarie llamada grupal de voz, por lo que no se utiliza la videollamada, ni se pueden amy las imágenes o rostros de los participantes. Hace jaime años y medio abrimos el primer Rory de AA por Skype en [place], andria actualmente asisten personas desde [place], [place], [place], [place], [place], [place], [place], [place], [place], [place], [place] y [place], entre otros.    El rory es muy útil para los alcohólicos que residen en lugares donde no se celebran reuniones de AA, o residen en lugares donde las reuniones de AA son un número limitado de días a la semana, o para aquellos compañeros que se hayan de viaje o que, por cualquier motivo, se hayan convalecientes y no pueden desplazarse. Todos los días nos reuniones a las 21:00 (hora española)    Podéis obtener más información sobre el rory y tai sesiones en la página web [email or web address]      MENTAL HEALTH:     [facility] - Outpatient Clinic  [phone]    [facility] - General " Psychiatry Intensive Outpatient Program  [facility] (formerly known as the BMU)  [phone], option 3    [facility]  Department of Psychiatry - Dual Diagnosis Intensive Outpatient Program  [facility] (formerly known as the ABU)  [phone], option 2      COMMUNITY MENTAL HEALTH CENTERS:     [facility]  (aka [facility], [facility])  Serves [facility].  Serves uninsured patients & those with Medicaid.  Main location: [address] [place] [id number] [phone]  Walk-in's available during regular business hours.  24/7 Crisis Line: [phone]    [facility]  (aka [facility], [facility])  Serves [facility].  Serves uninsured patients, those with Medicaid and some private plans.  Walk-in's available during regular business hours.  Primary care services available as well.  [facility]: [address] [facility] [place] [address];  [phone]  [place]: [address] [place] [address];  [phone]  24/7 Crisis Line: [phone]    [facility]  Serves uninsured patients & those with Medicaid, call for more info.  Primary care, pediatrics, HIV treatment, and dentistry services available as well.  Three locations.  [phone]    [facility]?Corporate Office  Serves patients with Medicaid, Medicare, and private insurance  [address]  [place],?[place] [address] [phone]    [facility]  Serves uninsured on a sliding scale, as well as Medicaid, Medicare, and private plans.  Eight locations around the [facility] area.  [phone]    [facility]  Serves uninsured patients & those with Medicaid, private insurances.  Primary care available as well.  [phone]  [address] [facility] [place]  [address]    [facility] Psych[facility]  Serves veterans who were honorably discharged.  [address] [place] [address]  [phone]  24/7 Veterans Crisis Line: [phone] (Press 1)    If you have private insurance and need to find a specialist, please contact your insurance network to request a list of providers covered by your benefits.      MENTAL HEALTH/ADDICTIVE DISORDERS:     AA ([phone]), NA ([phone])   National Suicide Prevention Lifeline- Call 1-285.697.8451 Available 24 hours everyday  [facility] [phone]; Crisis Line [phone] - Call for options A-F:  Intensive Outpatient Treatment/ Day programs   [facility], please contact   [facility], please contact [phone] or [phone] to speak with an admissions counselor.  [facility] (Methadone Maintenance)   [address] [place] [address], [phone]  [address] [place] [address] [phone]  [facility], [address] [place] [address], [phone]  [place] [facility] [phone]  [facility] please contact [phone]  [facility], [address] [facility], 4th [place] [address] Phone: [phone]   Acer  Chris Office: [place] [address], [phone]  [place] Office: [address] [facility], [address], [place] [address], [phone]  [place] Office: [address] [facility] [place] [address] [phone]    Outpatient Substance Abuse Treatment   [facility] (Methadone Maintenance)   [address] [place] [address], [phone]  [address] [place] [place] [address] [phone]  [facility], [address] [place] [address], [phone]  Acer  [place] Office: [address], [place] [address], [phone]  [place] Office: [address] [facility] [address], [place] [address],  [phone]  [place] Office: [address] [place] [address] [phone]  [place] [facility], [address] [place] [address] [phone]   [facility], [address] [facility], [address], [phone]  [facility], [address] [place], [place] [phone]    Residential Substance Abuse Treatment   [facility] 1[address] [facility], [phone] or [phone]  [facility], 4[address] [facility], [phone]  [facility] (men only) [address] [place] [address], [phone]  Women at the [facility] (women only) [address] [place] [address] [phone]  [facility], [address] [place] [address] [phone]  [facility] (women only), intakes at 4[address] [facility], [phone]  [facility] (7-day program, $100, [address], [phone], [phone], [phone])  [facility] (Men only, [phone]), [address], [place]ovany (Vets*/Non-Vets)  [facility] (Men only, $400/month program fee) [address] [facility] [place], [phone]  [facility] (Women over age 39 only), 2407 [facility], [phone]    Out of Area:    [place], [address] Critical access hospital [address], [place], [place] [phone])  [facility] (men only), [address] [facility] [place] [address], [phone]  [facility], [address] [place], [place] [phone])  [place], [address] [place] [address] Dr. Smiley, [place] ([phone])  [facility], 111 [facility], [phone]  [facility] (Women only, has to have mental illness, can be homeless or substance abuser), [phone]        DOMESTIC VIOLENCE RESOURCES:     Advocacy  [facility] ([facility])  [address] [address]    [facility] ? [phone]  Services provided: emergency shelter, individual advocacy,  information and referrals, group support, children's program, medical advocacy, forensic medical exams, primary care, legal assistance, counseling, safety planning, and caregiver support    [facility] HEALING AND EMPOWERMENT [place]  Confidential location  [facility] ? [phone]  Services provided: short term emergency shelter, all services provided are free of charge    [facility] CENTERS FOR COMMUNITY ADVOCACY  Multiple locations in [facility], [facility], [place], and [facility] ([place], [place], and [place])    [place] ? [phone]  Services provided: emergency shelter, individual advocacy, information and referrals, group support, children's program, medical advocacy, legal assistance in obtaining restraining orders, counseling, safety planning, and caregiver support    [facility]   Emergency Shelter   [phone]  Emergency Services ,Legal and Financial Assistance Services ,Housing Services ,Support Services     [facility]   [phone]  Emergency Services ,Counseling Services , Housing Services ,Support Services ,Children's Services     WOMEN WITH A VISION  [address] [place] [address]  WWAV ? [phone]  Services provided: advocacy, health education and supportive services, specializing in free healing services for marginalized groups, including LGBTQ individuals and sex workers    SEXUAL TRAUMA AWARENESS AND RESPONSE (STAR)  [address] [place] [address]    STAR ? [phone]  Services provided: individual advocacy, information and referrals, group support, medical advocacy, legal assistance, counseling, and safety planning for survivors of sexual assault    [facility] ([facility])  [address] [place] [address]  [facility] Forensic Program ? [phone]  Services provided: free forensic medical exams for sexual assault and domestic violence, which can be performed up to 5 days  after an incident. It is not necessary to make a police report to receive a forensic medical exam    Legal  [facility]  [address], [place] [address]  Project SAVE ? [phone]  Services provided: free emergency legal representation for survivors of doemstic violence residing in [facility]. Legal services may include temporary restraining orders, temporary child support, custody, and use of property    [facility] LEGAL SERVICES (SLLS)  [address] [facility] [facility], [address], [place] [address]  SLLS ? [phone]  Services provided: free legal representation for survivors of domestic violence residing in [facility]. Legal services may include temporary child support, custody, and divorce      HOTLINES:     [facility] DOMESTIC VIOLENCE HOTLINE  [phone]    Services provided: free and confidential hotline for victims and survivors of domestic violence. All calls will be routed to a domestic violence service provided in the victim or survivor's area    NATIONAL HUMAN TRAFFICKING HOTLINE  ([phone] [phone]    Services provided: national anti-trafficking hotline serving victims and survivors of human trafficking. Provides information about local resources, and access to safe space to report tips, seek services, and ask for help    VIA LINK  211 or [phone]    Service provided: counselors can provide crisis counseling. Counselors can also provide information and referrals to programs which can help with needs such as food, shelter, medical care, financial assistance, mental health services, substance abuse treatment, senior services, childcare, and more      HOMELESS SHELTERS:      Homeless shelters  [facility]  Emergency shelter for individuals and families  [address]  [phone]  [facility]  Emergency shelter for men only  Meals daily 6am, 2pm, & 6pm  Clothing, case management M-F by appointment (ID/job/housing/legal assistance), mail  [address]    [phone]  [place]  Emergency shelter for men  [address] [facility]  [phone]  Emergency shelter for women  [address]   [phone]  Breakfast & lunch daily, dinner M-F  Case management, job counseling services   [facility] [place]  Emergency shelter for teens and young adults up to 22yo  [address]   [phone]  [place] [facility]  Emergency shelter for women over 19yo and their kids  [address] [place] [address]  [phone]  [facility]  Day program, meals M-F 1PM (arrive early)  Showers, laundry, hygiene kits, showers, phones, , notary services, case management, ID assistance  [address]   [phone] M-F 8am-2:30pm  [employer]  Day program  MTW 7:30am-3:30pm,  8:30am-3:30pm  Crisis intervention, employment assistance, food/clothing, hygiene kits, bus tokens, mail  [address] [facility] [address]  [phone]  [facility]  Mobile outreach for homeless persons in [facility]  [phone]  [facility]  Primary healthcare, case management, dental services, TB placement  Call ahead  [address] [[email or web address] 2nd Floor  [phone]  [facility] at [facility]  Connects homeless people with their loved ones in other cities by providing transportation costs   [phone]      [place] RESOURCES:     [place] Mobile Mental Health Crisis Response Team:    Region 12 ([place], [place], [place], and [facility]) ([facility] and [facility])  [phone]      Outpatient Mental Health & Addiction Clinic Resources for both [facility] and [facility]:    [facility] Mental Healthcare Resources  Website: www.mhr.org  Main Number: 302-470-0532    [facility] ([facility])  [address] ([address] [place] [address]  [phone]    [facility] ([facility])  [address] ([address] [facility]) [facility]  [place] [address]  [phone]    [facility]  [address] ([address]) [place] [address]  [phone]    [facility]  [address] ([address] [place] [address]) [place] [address]  [phone]      Addiction Treatment Resources for both [facility] and [facility]:    [facility] (Detox, Residential, PHP, IOP, and Aftercare Programs)  [address] [place] [address]  [phone]    [facility] (Residential, IOP, Transitional Living, and Aftercare Programs)  [address] [place] [address]  [phone]    [facility] (Inpatient, Residential, Detox, IOP, Outpatient, and Aftercare Programs)  [address] [place] [address] [place] [address]  [phone] or toll free at [phone]      Outpatient Mental Health Psychotherapy Resources for both [facility] and [facility]:    Roula Caro, [facility]  [address]  [place] [address]  [phone]  Specialties: Depression, Anxiety, and Life Transitions    Kerline Tello, PhD  [address] [place] [address] [address]  [address]  [place] [address]  [phone]  Specialties: Testing and Evaluation, Education and Learning Disabilities, and ADHD    Shae Nobles, [facility] [address] [place] [address], [place] [address]  [phone]  Specialties: Obsessive-Compulsive (OCD), Depression, and Relationship Issues    Hina Angeles L[facility] [address] [facility] [address] [place] [address]  [phone]  Specialties: Trauma & PTSD, Mood Disorders, and Anxiety    Hina Colon, PhD, [facility]  [facility] [address] [facility] [place] [address]  [phone]  Specialties: Family Conflict, Child, and Relationship Issues    Lisa Lara, [facility] [place] [address] [phone]  Specialties: Anxiety, Depression, and Anger Management        IN CASE OF SUICIDAL THINKING,  call the National Suicide Hotline Number: 988    988 Suicide & Crisis Lifeline: 988 , 1-259-895-TALK 8255)  Provides 24/7, free and confidential support for people in distress, prevention and crisis resources for you or your loved ones, and best practices for professionals.    Call, text or chat.  https://[employer]org

## 2025-04-22 NOTE — PT/OT/SLP EVAL
"Occupational Therapy  Co -  Evaluation with PT and OT Discharge Note    Co-evaluation/treatment performed due to patient's multiple deficits requiring two skilled therapists to appropriately and safely assess patient's strength and endurance while facilitating functional tasks in addition to accommodating for patient's activity tolerance.       Name: Tim Richards  MRN: [id number]  Admitting Diagnosis: Ventricular tachycardia  Recent Surgery: * No surgery found *      Recommendations:     Discharge Recommendations: No Therapy Indicated  Discharge Equipment Recommendations: none  Barriers to discharge:  None    Assessment:     Tim Richards is a 59 y.o. male with a medical diagnosis of Ventricular tachycardia. Pt completed bed mobility, STS transfer, and functional mobility within hospital room with independence. Pt demo'd independence changing from wall power to battery power, however did not have his vest present. Pt also able to stand at sink and complete oral and facial hygiene independently, however pt began to bleed at IV site, RN present to manage and pt sat EOB with RN staff.  After line managed, pt assisted back to bed with HOB raised. At this time, patient is functioning at their prior level of function and does not require further acute OT services.     Plan:     During this hospitalization, patient does not require further acute OT services.  Please re-consult if situation changes.    Plan of Care Reviewed with: patient    Subjective     Chief Complaint: "I'm good"   Patient/Family Comments/goals: Get better, return home     Occupational Profile:  Living Environment: Pt lives with spouse in split level home with 1 SALVADOR threshold at front door, ~12 SALVADOR to bd/ba on 2nd floor with LHR; tub/sh combo  Previous level of function: independent with functional mobility/ADLs  Roles and Routines:   Equipment Used at home: cane, straight, walker, rolling, walker, standard, shower chair  Assistance upon " Discharge: wife    Pain/Comfort:  Pain Rating 1: 0/10  Pain Rating Post-Intervention 1: 0/10    Patients cultural, spiritual, Mu-ism conflicts given the current situation: no    Objective:     Communicated with: RN prior to session.  Patient found HOB elevated with telemetry, LVAD, peripheral IV, pulse ox (continuous) (S-ICD shock system) upon OT entry to room.    General Precautions: Standard, fall, LVAD  Orthopedic Precautions: N/A  Braces: N/A  Respiratory Status: Room air     Occupational Performance:    Bed Mobility:    Patient completed Rolling/Turning to Left with  independence  Patient completed Rolling/Turning to Right with independence  Patient completed Scooting/Bridging with independence  Patient completed Supine to Sit with independence  Patient completed Sit to Supine with independence    Functional Mobility/Transfers:  Patient completed Sit <> Stand Transfer with independence  with  no assistive device   Functional Mobility: Pt engaging in functional mobility to simulate household/community distances within hospital room  with independence and utilizing no AD in order to maximize functional activity tolerance and standing balance required for engagement in occupations of choice.   Pt able to stand at sink to complete grooming activities with SBA to independence    Activities of Daily Living:  Grooming: independence oral and facial hygiene  Upper Body Dressing: contact guard assistance donning 2nd gown while navigating lines   Lower Body Dressing: independence donning bilateral slip-on shoes    Cognitive/Visual Perceptual:  Cognitive/Psychosocial Skills:     -       Oriented to: AOX4   -       Follows Commands/attention:Follows multistep  commands  -       Communication: clear/fluent  -       Memory: No Deficits noted  -       Safety awareness/insight to disability: impaired   -       Mood/Affect/Coping skills/emotional control: Pleasant  Visual/Perceptual:      -Intact      Physical  Exam:  Balance:    -       Sitting: Intact.  Standing: Intact  Postural examination/scapula alignment:    -       No postural abnormalities identified  Dominant hand:    -       right  Upper Extremity Range of Motion:     -       Right Upper Extremity: WNL  -       Left Upper Extremity: WNL  Upper Extremity Strength:    -       Right Upper Extremity: WNL  -       Left Upper Extremity: WNL   Strength:    -       Right Upper Extremity: WNL  -       Left Upper Extremity: WNL  Fine Motor Coordination:    -       Intact  Neurological:    -       intact    AMPAC 6 Click ADL:  AMPAC Total Score: 24    Treatment & Education:  Pt educated on role of OT, POC, and goals for therapy.    POC was dicussed with patient/caregiver, who was included in its development and is in agreement with the identified goals and treatment plan.   Patient and family aware of patient's deficits and therapy progression.   Time provided for therapeutic counseling and discussion of health disposition.   Educated on importance of EOB/OOB mobility, maintaining routine, sitting up in chair, and maximizing independence with ADLs during admission   Pt completed ADLs and functional mobility for treatment session as noted above   Pt/caregiver verbalized understanding and expressed no further concerns/questions.  Updated communication board with level of assist required       Patient left HOB elevated with all lines intact, call button in reach, and RN present    GOALS:   Multidisciplinary Problems       Occupational Therapy Goals       Not on file                    DME Justifications:  No DME recommended requiring DME justifications    History:     Past Medical History:   Diagnosis Date    A-fib     Anticoagulant long-term use     Atrial flutter 7/30/2022    CHF (congestive heart failure)     Class 1 obesity due to excess calories with serious comorbidity and body mass index (BMI) of 31.0 to 31.9 in adult     Class 1 obesity due to excess calories with  serious comorbidity and body mass index (BMI) of 32.0 to 32.9 in adult     Dilated cardiomyopathy 1/10/2018    Disorder of kidney and ureter     CKD    Encounter for blood transfusion     Essential hypertension 8/28/2022    Gout     HTN (hypertension)     Hx of psychiatric care     ICD (implantable cardioverter-defibrillator) infection 7/1/2020    Psychiatric problem     Thyroid disease     Ventricular tachycardia (paroxysmal)          Past Surgical History:   Procedure Laterality Date    AORTIC VALVULOPLASTY N/A 07/13/2022    Procedure: REPAIR, AORTIC VALVE;  Surgeon: Yg Kaufman MD;  [facility];  Service: Cardiovascular;  Laterality: N/A;    APPLICATION OF WOUND VACUUM-ASSISTED CLOSURE DEVICE N/A 07/15/2022    Procedure: APPLICATION, WOUND VAC;  Surgeon: Yg Kaufman MD;  [facility];  Service: Cardiovascular;  Laterality: N/A;  50 x 5 cm     APPLICATION OF WOUND VACUUM-ASSISTED CLOSURE DEVICE Right 09/27/2022    Procedure: APPLICATION, WOUND VAC;  Surgeon: Kole Tabares MD;  [facility];  Service: Plastics;  Laterality: Right;    CARDIAC CATHETERIZATION  12/2012    CARDIAC DEFIBRILLATOR PLACEMENT Left     CRRT-D    CARDIAC VALVE REPLACEMENT  03/08/2018    LVAD Implant    COLONOSCOPY N/A 03/06/2018    Procedure: COLONOSCOPY;  Surgeon: Alonso Bone MD;  Location: [facility] (2ND FLR);  Service: Endoscopy;  Laterality: N/A;    COLONOSCOPY N/A 07/17/2019    Procedure: COLONOSCOPY;  Surgeon: Blane Valdez MD;  Location: [facility] ENDO (2ND FLR);  Service: Endoscopy;  Laterality: N/A;    COLONOSCOPY N/A 07/18/2019    Procedure: COLONOSCOPY;  Surgeon: Blane Valdez MD;  Location: [facility] ENDO (2ND FLR);  Service: Endoscopy;  Laterality: N/A;    CREATION OF ILIOFEMORAL ARTERY BYPASS Right 09/27/2022    Procedure: CREATION, BYPASS, ARTERIAL, ILIAC TO FEMORAL WITH GRAFT;  Surgeon: Zach Hernández MD;  [facility];  Service: Peripheral Vascular;  Laterality:  Right;    CREATION OF MUSCLE ROTATIONAL FLAP Right 09/27/2022    Procedure: CREATION, FLAP, MUSCLE ROTATION, SARTORIUS AND RECTUS FEMORIS;  Surgeon: Kole Tabares MD;  Location: [facility] OR 2ND FLR;  Service: Plastics;  Laterality: Right;    ESOPHAGOGASTRODUODENOSCOPY N/A 07/17/2019    Procedure: EGD (ESOPHAGOGASTRODUODENOSCOPY);  Surgeon: Blane Valdez MD;  Location: [facility] ENDO (2ND FLR);  Service: Endoscopy;  Laterality: N/A;    ESOPHAGOGASTRODUODENOSCOPY N/A 07/18/2019    Procedure: EGD (ESOPHAGOGASTRODUODENOSCOPY);  Surgeon: Blane Valdez MD;  Location: [facility] ENDO (2ND FLR);  Service: Endoscopy;  Laterality: N/A;    FOREIGN BODY REMOVAL N/A 07/22/2022    Procedure: REMOVAL, FOREIGN BODY;  Surgeon: Yg Kaufman MD;  Location: [facility] OR [facility] FLR;  Service: Cardiovascular;  Laterality: N/A;  LVAD Heartmate 2 drive line removal    INSERTION OF GRAFT TO PERICARDIUM N/A 07/15/2022    Procedure: INSERTION, GRAFT, PERICARDIUM;  Surgeon: Yg Kaufman MD;  Location: [facility] OR [facility] FLR;  Service: Cardiovascular;  Laterality: N/A;    IRRIGATION OF MEDIASTINUM N/A 07/15/2022    Procedure: IRRIGATION, MEDIASTINUM;  Surgeon: Yg Kaufman MD;  Location: [facility] OR [facility] FLR;  Service: Cardiovascular;  Laterality: N/A;    LYSIS OF ADHESIONS  07/13/2022    Procedure: LYSIS, ADHESIONS;  Surgeon: Yg Kaufman MD;  [facility];  Service: Cardiovascular;;    NONINVASIVE CARDIAC ELECTROPHYSIOLOGY STUDY N/A 10/18/2019    Procedure: CARDIAC ELECTROPHYSIOLOGY STUDY, NONINVASIVE;  Surgeon: Raz Wagner MD;  Location: [facility] EP LAB;  Service: Cardiology;  Laterality: N/A;  VT, DFTs, MDT CRTD in situ, LVAD, anes MB, 3098    REPLACEMENT OF IMPLANTABLE CARDIOVERTER-DEFIBRILLATOR (ICD) GENERATOR N/A 03/09/2020    Procedure: REPLACEMENT, ICD GENERATOR;  Surgeon: Harry Yun MD;  Location: [facility] EP LAB;  Service: Cardiology;  Laterality: N/A;  VT, ICD Gen Change and Lead Revision, MDT, MAC, DM,3 Prep    REPLACEMENT OF LEFT  VENTRICULAR ASSIST DEVICE (LVAD)  07/13/2022    Procedure: REPLACEMENT, LVAD;  Surgeon: Yg Kaufman MD;  [facility];  Service: Cardiovascular;;    REPLACEMENT OF PUMP N/A 07/13/2022    Procedure: REPLACEMENT, PUMP;  Surgeon: Yg Kaufman MD;  Location: [facility] OR [facility] FLR;  Service: Cardiovascular;  Laterality: N/A;  LVAD pump exchange  EXPLANATION OF HEATMATE 2  IMPLANTATION OF HEARTMATE 3  IMPLANTATION OF 8MM CHIMNEY GRAFT TO RFA  INITIATION OF ECMO  TEMPORARY CLOSURE OF CHEST    REVISION OF IMPLANTABLE CARDIOVERTER-DEFIBRILLATOR (ICD) ELECTRODE LEAD PLACEMENT N/A 03/09/2020    Procedure: REVISION, INSERTION, ELECTRODE LEAD, ICD;  Surgeon: Harry Yun MD;  Location: [facility] EP LAB;  Service: Cardiology;  Laterality: N/A;  VT, ICD Gen Change and Lead Revision, MDT, MAC, DM,3 Prep    RIGHT HEART CATHETERIZATION Right 09/08/2023    Procedure: INSERTION, CATHETER, RIGHT HEART;  Surgeon: Gaurav Rodriguez MD;  Location: [facility] CATH LAB;  Service: Cardiology;  Laterality: Right;    STERNAL WOUND CLOSURE N/A 07/14/2022    Procedure: CLOSURE, WOUND, STERNUM;  Surgeon: Yg Kaufman MD;  [facility];  Service: Cardiovascular;  Laterality: N/A;  temporary closure  evacuation of hematoma    STERNAL WOUND CLOSURE N/A 07/15/2022    Procedure: CLOSURE, WOUND, STERNUM;  Surgeon: Yg Kaufman MD;  [facility];  Service: Cardiovascular;  Laterality: N/A;    TRACHEOSTOMY N/A 08/04/2022    Procedure: CREATION, TRACHEOSTOMY;  Surgeon: Germain Holt MD;  [facility];  Service: General;  Laterality: N/A;    TREATMENT OF CARDIAC ARRHYTHMIA  10/18/2019    Procedure: Cardioversion or Defibrillation;  Surgeon: Raz Wagner MD;  Location: [facility] EP LAB;  Service: Cardiology;;       Time Tracking:     OT Date of Treatment: 04/22/25  OT Start Time: 1552  OT Stop Time: 1619  OT Total Time (min): 27 min    Billable Minutes:Evaluation 8  Therapeutic Activity 19    4/22/2025

## 2025-04-22 NOTE — DISCHARGE INSTRUCTIONS
REFERRAL RECOMMENDATIONS FOR ALCOHOL USE DISORDER      12 STEP PROGRAMS (and similar):     Alcoholics Anonymous (local)  [x] [phone]  [x] www.[place].org for schedules for in-person and online meetings  [x] There are AA meetings throughout the day all over town  [x] AA costs nothing to attend; they pass a basket for donations but this is not required    Alcoholics Anonymous Online Intergroup (national)  [x] www.aa-intergroup.org  [x] Good resource for large, nation-wide meetings  [x] Can also attend smaller, local meetings in other cities  [x] Countless meetings all day and all night  [x] AA costs nothing to attend; they pass a basket for donations but this is not required    Flying Sober - 24/7 zoom meetings for women and coed - sign on anytime, anywhere!  [email or web address]-[email or web address]    Online Intergroup of AA - 121 Open AA [place] Meeting - 24/7 zoom meetings  https://aa-intergroup.org/meetings/    LOOKING FOR AN ALTERNATIVE TO 12 STEP PROGRAMS - check out:  SMART Recovery: https://www.smartrecovery.org/about-us  [facility]: https://recoverydharma.org      DETOX UNITS (USUALLY 5-7 DAYS):     [facility] [place] [facility]: [address], [place]  [phone], call first to ensure bed availability    [facility]: [address] [place]  [phone], Option 1, call first to ensure bed availability    [place] [facility] Center: [facility] Jim[address] Shankar, [place]  [phone] (intake by appointment only)    [facility]: [address], [place]  [phone]      INTENSIVE OUTPATIENT PROGRAMS:     [facility] (formerly known as the ABU)  [x] [phone], Option 2  [x] [address], [address] 4th Floor, [place] [address]  [x] https://www.ochsner.org/services/[facility]-recovery-program  [x] [facility] delivers comprehensive and collaborative treatment for alcohol and substance use disorders.  Excellent program for working professionals or  anyone else seeking recovery.  [x] Requires insurance approval prior to starting program, call number above for more information.  [x] Intensive Outpatient Rehabilitation Program - M-F 9am-3pm - daily groups with psychologists and social workers, sessions with MDs 3x per week   [x] Ambulatory detox and dual diagnosis available    [facility] Intensive Outpatient Program  [x] [phone]  [x] [address] [facility] (the clinic not on [facility])  [x] Call number above for more info and to check insurance requirements    [facility]  [address] [place], [place] [address] [phone]    [facility]:  [address] [facility] [place], [place] [address]?, [phone]  [address] [facility]?, [place], [place] [address]?, [phone]    RESIDENTIAL REHABS (USUALLY 28 DAYS):     [facility]: [address], [phone]    [facility]: [address] , [facility]  [phone] (intake by appointment only)    [facility] (men only) [address] [facility], [phone]    [facility] (Female only) [address] [facility], [phone]    [facility]: [address], [facility], men's program [phone], women's program [phone]    [facility]: [address], [facility], [phone]    [facility]: [address] [place], [place], [phone]    [facility]: Men only, [phone], [address] [place] [facility]: [address], [place], [place], [phone]    [facility]: [facility] [place], [place],  [phone]  New Location: [address] [place] [address] [address], [place] [address], [phone]    [facility]:   ?[address]?[place], [place] [address]?[phone]    [place]: [address], [place] [address], [phone]    [place]: [place], [phone]     [facility]: [place], [place], [phone]    [address]: Donna  LEISA Sharif, [phone]    [facility]: [place], [place], [phone]    [place]: [place], [place], [phone]    Rolanda [place]: [address], [place] [address], [phone]    COMMUNITY ADDICTION CLINICS:     [facility]: [address] [facility] [address] [place], [phone] -or- [address] [place] [address]    [facility] [place]: [address] [facility], [place]  [address]     [facility]: Clinics [phone]; Crisis [phone]    [facility]: [address] [facility] [place] [address]    [facility]: [address] [facility] [place] [address]    [facility]: [address] [place] [address],    [facility]: 2nd Floor [address] [facility] [place] [address]    [facility]: [address] [place] [address]    [facility], [address] [place] [address]    [facility]: [address] [place] [address], [phone]  (serves youth 16-23 years old)    [facility]: [facility]/[phone]    [facility]: [address] [facility], [phone]    [facility]: [address], [phone]    [facility]: [address] [facility], [address], [phone]     [facility]: [address] [facility], [phone], [phone]    RESOURCES IN OTHER [facility]:     [facility]: [address] [place], [phone], [phone]    [facility]: [address] [facility], [address], [phone]    [facility], [address] [place] [address], [phone]    [facility]: [address], [place], [phone]    [facility]  "[facility], [address] [facility], [phone] ([facility])    [facility], [address] [facility], [phone] ([facility])    [facility], [address], [place], [phone] ([employer], [place], and [facility])    [facility], [address], [place], [phone] ([facility])    [facility] Hotline [phone], [phone]    [facility], [address] [facility], [facility], [address] [facility], [address], [facility], [address] [facility], [facility]al [facility], [address] [facility] [address] [facility], [address] [facility] Human Services, [address], [address] [phone]    [facility], [address] [facility] [phone]    [facility], [address] [facility], [phone]    [facility] for Addiction Recovery, [address] [place] [address], [phone]      Indian SPEAKING (en español):     Información de la reunión de Alcohólicos Anónimos  [facility]ory [facility], 10:00 am  Habla español  Esta reunión está abierta y cualquiera puede asistir.    Frisian speaking Alcoholics anonymous meetings:  El "Rory [place] AA Skype" es un rory on line de Alcohólicos Anónimos en español. El rory es chani, gratuito y virtual a través de Skype Audio. El rory funciona mediante rosemarie llamada grupal de voz, por lo que no se utiliza la videollamada, ni se pueden amy las imágenes o rostros de los participantes. Hace jaime años y medio abrimos el primer Rory de AA por [place] en [place], andria actualmente asisten personas desde [place], [place], [place], [place], [place], [place], [place], [place], [place], [place], " [place] y [place], entre otros.    El raj es muy útil para los alcohólicos que residen en lugares donde no se celebran reuniones de AA, o residen en lugares donde las reuniones de AA son un número limitado de días a la semana, o para aquellos compañeros que se hayan de viaje o que, por cualquier motivo, se hayan convalecientes y no pueden desplazarse. Todos los días nos reuniones a las 21:00 (hora española)    Podéis obtener más información sobre el raj y tai sesiones en la págBaremetrics web https://Nu3.The Luxury Club.tl/      MENTAL HEALTH/ADDICTIVE DISORDERS:     AA [phone]), NA ([phone])   National Suicide Prevention Lifeline- Call 1-638.113.5550 Available 24 hours [facility] [phone]; Crisis Line [phone] - Call for options A-F:  Intensive Outpatient Treatment/ Day programs   [facility], please contact   [facility], please contact [phone] or [phone] to speak with an admissions counselor.  Behavioral Health Group (Methadone Maintenance)   [address] [place] [address], [phone]  [facility] [place] [address] [phone]  [facility], [address] [place] [address], [phone]  [place] [phone]  [facility] [place] please contact [phone]  [facility], [facility], 4th floor LEISA [place] [address] Phone: [phone]   [facility]  [place] Office: [address] [address], [phone]  [place] Office: [address] [facility] [address], [place] [address], [phone]  [place] Office: [address] [facility] [place] [address] [phone]    Outpatient Substance Abuse Treatment   Behavioral Health Group (Methadone Maintenance)   [facility], [facility] [place] [address], [phone]  [address] [place] [address] [phone]  [facility], [address] [place] [address], [phone]  [facility]  [place] Office: [address] [place] [address], [phone]  [place] Office: [address] [facility], [address], [place] [address], [phone]  [place] Office: [address] [place] [address] [phone]  [facility], [address] [place] [address] [phone]   [facility] Recovery, [address] [facility], [address], [phone]  [facility], [address] [place], [place] [phone]    Residential Substance Abuse Treatment   [facility] [address] [facility], [phone] or [phone]  [facility], [address] [facility], [phone]  [facility] (men only) [address] [place] [address], [phone]  [facility] (women only) [address] [place] [address] [phone]  [facility], [address] [place] [address] [phone]  [facility] (women only), intakes at [address] [facility], [phone]  [facility] (7-day program, $100, [address], [phone], [phone], [phone])  [facility] (Men only, [phone]), [address], [place] (Vets*/Non-Vets)  [facility] (Men only, $400/month program fee) [address] [facility] Zack Decker, [phone]  [facility] (Women over age 39 only), [address] [facility], [phone]    Out of Area:    [place], [address] [place] [address] 36, [place], [place] [phone])  [facility] (men only), [address] [facility] [place] [address], [phone]  [facility], [address] [place], [place] [phone])  [address] [place], [place] ([phone])  [facility], [address] [place] [address], [phone]  [facility] (Women only, has to have mental illness, can be homeless or substance abuser), [phone]      [place] RESOURCES:     [place] [facility] Crisis Response  Team:    Region 12 ([place], [place], [place], and [facility]) ([facility])  [phone]      Outpatient Mental Health & Addiction Clinic Resources for both [facility]:    [facility] Resources  Website: www.[facility].org  Main Number: [phone]    [facility] ([facility])  [address] ([address] [facility]) [place] [address]  [phone]    [facility] ([facility])  [address] ([address] [facility]) [place] [address]  [phone]    [facility]  [address] ([address]) [place] [address]  [phone]    [facility]  [address] ([address] [facility]) [place] [address]  [phone]      Addiction Treatment Resources for both [facility]:    [facility] (Detox, Residential, PHP, IOP, and Aftercare Programs)  [address] [place], [place] [address] [phone]    [facility] (Residential, IOP, Transitional Living, and Aftercare Programs)  [address] [place] [address]  [phone]    [place] [facility] (Inpatient, Residential, Detox, IOP, Outpatient, and Aftercare Programs)  [address] [place] [id number] [phone] or toll free at [phone]      Outpatient Mental Health Psychotherapy Resources for both [facility]:    Roula Caro, LUPEW  [address]  [place] [address]  [phone]  Specialties: Depression, Anxiety, and Life Transitions    Kerline Tello, PhD  [address]  [place] [address]  [phone]  Specialties: Testing and Evaluation, Education and Learning Disabilities, and ADHD    Shae Nobles, LUPEW [facility] [address] [place] [address]  [phone]  Specialties:  Obsessive-Compulsive (OCD), Depression, and Relationship Issues    Hina [place] LPC [address] [place] [address]  [phone]  Specialties: Trauma & PTSD, Mood Disorders, and Anxiety    Hina Colon, PhD, [facility] [address] [place] [address], [place] [id number] [phone]  Specialties: Family Conflict, Child, and Relationship Issues    Lisa Lara LPC Counseling [place], [place] [address] [phone]  Specialties: Anxiety, Depression, and Anger Management        IN CASE OF SUICIDAL THINKING, call the National Suicide Hotline Number: 988    988 Suicide & Crisis Lifeline: 988 , 5-1693-928-347-TALK (9013)  Provides 24/7, free and confidential support for people in distress, prevention and crisis resources for you or your loved ones, and best practices for professionals.    Call, text or chat.  https://988Biophysical Corporationline.org

## 2025-04-22 NOTE — PLAN OF CARE
CICU DAILY GOALS       A: Awake    RASS: Goal -    Actual - RASS (Newman Agitation-Sedation Scale): alert and calm   Restraint necessity:    B: Breath   SBT: NA   C: Coordinate A & B, analgesics/sedatives   Pain: managed    SAT: NA  D: Delirium   CAM-ICU:    E: Early(intubated/ Progressive (non-intubated) Mobility   MOVE Screen:  n/a   Activity: Activity Management: Rolling - L1  FAS: Feeding/Nutrition   Diet order: Diet/Nutrition Received: regular,   Fluid restriction:     Nutritional Supplement Intake: Quantity n/a, Type:  n/a  T: Thrombus   DVT prophylaxis: VTE Core Measure: Pharmacological prophylaxis initiated/maintained  H: HOB Elevation   Head of Bed (HOB) Positioning: HOB elevated  U: Ulcer Prophylaxis   GI: yes  G: Glucose control   managed    S: Skin   Bathing/Skin Care: bath, complete;linen changed (04/21/25 2001)  Wounds: No  Wound care consulted: No  B: Bowel Function   no issues   I: Indwelling Catheters   [place] necessity:     CVC necessity: No  D: De-escalation Antibx   No  Plan for the day   Continue current support  Family/Goals of care/Code Status   Code Status: Full Code     No acute events throughout the night, VS and assessment per flow sheet, patient progressing towards goals as tolerated, plan of care reviewed with Tim Richards and family, all concerns addressed, will continue to monitor.

## 2025-04-23 VITALS
RESPIRATION RATE: 25 BRPM | WEIGHT: 239.19 LBS | SYSTOLIC BLOOD PRESSURE: 101 MMHG | DIASTOLIC BLOOD PRESSURE: 67 MMHG | BODY MASS INDEX: 31.7 KG/M2 | OXYGEN SATURATION: 97 % | HEART RATE: 55 BPM | HEIGHT: 73 IN | TEMPERATURE: 97 F

## 2025-04-23 LAB
ABSOLUTE EOSINOPHIL (OHS): 0.08 K/UL
ABSOLUTE MONOCYTE (OHS): 0.56 K/UL (ref 0.3–1)
ABSOLUTE NEUTROPHIL COUNT (OHS): 2.54 K/UL (ref 1.8–7.7)
ALBUMIN SERPL BCP-MCNC: 3.2 G/DL (ref 3.5–5.2)
ALP SERPL-CCNC: 82 UNIT/L (ref 40–150)
ALT SERPL W/O P-5'-P-CCNC: 9 UNIT/L (ref 10–44)
ANION GAP (OHS): 8 MMOL/L (ref 8–16)
APTT PPP: 42 SECONDS (ref 21–32)
AST SERPL-CCNC: 20 UNIT/L (ref 11–45)
BASOPHILS # BLD AUTO: 0.01 K/UL
BASOPHILS NFR BLD AUTO: 0.2 %
BILIRUB DIRECT SERPL-MCNC: 0.2 MG/DL (ref 0.1–0.3)
BILIRUB SERPL-MCNC: 0.4 MG/DL (ref 0.1–1)
BUN SERPL-MCNC: 26 MG/DL (ref 6–20)
CALCIUM SERPL-MCNC: 9 MG/DL (ref 8.7–10.5)
CHLORIDE SERPL-SCNC: 103 MMOL/L (ref 95–110)
CO2 SERPL-SCNC: 26 MMOL/L (ref 23–29)
CREAT SERPL-MCNC: 2 MG/DL (ref 0.5–1.4)
CRP SERPL-MCNC: 22.9 MG/L
ERYTHROCYTE [DISTWIDTH] IN BLOOD BY AUTOMATED COUNT: 15 % (ref 11.5–14.5)
GFR SERPLBLD CREATININE-BSD FMLA CKD-EPI: 38 ML/MIN/1.73/M2
GLUCOSE SERPL-MCNC: 86 MG/DL (ref 70–110)
HCT VFR BLD AUTO: 39.4 % (ref 40–54)
HGB BLD-MCNC: 12.3 GM/DL (ref 14–18)
IMM GRANULOCYTES # BLD AUTO: 0.01 K/UL (ref 0–0.04)
IMM GRANULOCYTES NFR BLD AUTO: 0.2 % (ref 0–0.5)
INR PPP: 1.8 (ref 0.8–1.2)
LDH SERPL-CCNC: 202 U/L (ref 110–260)
LIDOCAIN SERPL-MCNC: 3.6 MCG/ML (ref 1.5–5)
LYMPHOCYTES # BLD AUTO: 1.08 K/UL (ref 1–4.8)
MAGNESIUM SERPL-MCNC: 2.3 MG/DL (ref 1.6–2.6)
MCH RBC QN AUTO: 28 PG (ref 27–31)
MCHC RBC AUTO-ENTMCNC: 31.2 G/DL (ref 32–36)
MCV RBC AUTO: 90 FL (ref 82–98)
NUCLEATED RBC (/100WBC) (OHS): 0 /100 WBC
PHOSPHATE SERPL-MCNC: 2.4 MG/DL (ref 2.7–4.5)
PLATELET # BLD AUTO: 182 K/UL (ref 150–450)
PLPETH BLD-MCNC: 543 NG/ML
PMV BLD AUTO: 10.3 FL (ref 9.2–12.9)
POPETH BLD-MCNC: 607 NG/ML
POTASSIUM SERPL-SCNC: 4.5 MMOL/L (ref 3.5–5.1)
PREALB SERPL-MCNC: 24 MG/DL (ref 20–43)
PROT SERPL-MCNC: 7.5 GM/DL (ref 6–8.4)
PROTHROMBIN TIME: 18.6 SECONDS (ref 9–12.5)
RBC # BLD AUTO: 4.39 M/UL (ref 4.6–6.2)
RELATIVE EOSINOPHIL (OHS): 1.9 %
RELATIVE LYMPHOCYTE (OHS): 25.2 % (ref 18–48)
RELATIVE MONOCYTE (OHS): 13.1 % (ref 4–15)
RELATIVE NEUTROPHIL (OHS): 59.4 % (ref 38–73)
SODIUM SERPL-SCNC: 137 MMOL/L (ref 136–145)
TOXICOLOGIST REVIEW: NORMAL
WBC # BLD AUTO: 4.28 K/UL (ref 3.9–12.7)

## 2025-04-23 PROCEDURE — 84100 ASSAY OF PHOSPHORUS: CPT | Performed by: STUDENT IN AN ORGANIZED HEALTH CARE EDUCATION/TRAINING PROGRAM

## 2025-04-23 PROCEDURE — 86140 C-REACTIVE PROTEIN: CPT | Performed by: STUDENT IN AN ORGANIZED HEALTH CARE EDUCATION/TRAINING PROGRAM

## 2025-04-23 PROCEDURE — 82248 BILIRUBIN DIRECT: CPT | Performed by: STUDENT IN AN ORGANIZED HEALTH CARE EDUCATION/TRAINING PROGRAM

## 2025-04-23 PROCEDURE — 25000003 PHARM REV CODE 250: Performed by: STUDENT IN AN ORGANIZED HEALTH CARE EDUCATION/TRAINING PROGRAM

## 2025-04-23 PROCEDURE — 83735 ASSAY OF MAGNESIUM: CPT | Performed by: STUDENT IN AN ORGANIZED HEALTH CARE EDUCATION/TRAINING PROGRAM

## 2025-04-23 PROCEDURE — 83615 LACTATE (LD) (LDH) ENZYME: CPT | Performed by: STUDENT IN AN ORGANIZED HEALTH CARE EDUCATION/TRAINING PROGRAM

## 2025-04-23 PROCEDURE — 93750 INTERROGATION VAD IN PERSON: CPT | Mod: ,,, | Performed by: INTERNAL MEDICINE

## 2025-04-23 PROCEDURE — 84134 ASSAY OF PREALBUMIN: CPT | Performed by: STUDENT IN AN ORGANIZED HEALTH CARE EDUCATION/TRAINING PROGRAM

## 2025-04-23 PROCEDURE — 80053 COMPREHEN METABOLIC PANEL: CPT | Performed by: STUDENT IN AN ORGANIZED HEALTH CARE EDUCATION/TRAINING PROGRAM

## 2025-04-23 PROCEDURE — 94761 N-INVAS EAR/PLS OXIMETRY MLT: CPT

## 2025-04-23 PROCEDURE — 85610 PROTHROMBIN TIME: CPT | Performed by: STUDENT IN AN ORGANIZED HEALTH CARE EDUCATION/TRAINING PROGRAM

## 2025-04-23 PROCEDURE — 25000003 PHARM REV CODE 250

## 2025-04-23 PROCEDURE — 85025 COMPLETE CBC W/AUTO DIFF WBC: CPT | Performed by: STUDENT IN AN ORGANIZED HEALTH CARE EDUCATION/TRAINING PROGRAM

## 2025-04-23 PROCEDURE — 85730 THROMBOPLASTIN TIME PARTIAL: CPT | Performed by: STUDENT IN AN ORGANIZED HEALTH CARE EDUCATION/TRAINING PROGRAM

## 2025-04-23 PROCEDURE — 99233 SBSQ HOSP IP/OBS HIGH 50: CPT | Mod: 25,,, | Performed by: INTERNAL MEDICINE

## 2025-04-23 PROCEDURE — 27000248 HC VAD-ADDITIONAL DAY

## 2025-04-23 RX ORDER — POTASSIUM CHLORIDE 20 MEQ/1
TABLET, EXTENDED RELEASE ORAL
Qty: 36 TABLET | Refills: 9 | Status: SHIPPED | OUTPATIENT
Start: 2025-04-23

## 2025-04-23 RX ORDER — SODIUM,POTASSIUM PHOSPHATES 280-250MG
1 POWDER IN PACKET (EA) ORAL ONCE
Status: COMPLETED | OUTPATIENT
Start: 2025-04-23 | End: 2025-04-23

## 2025-04-23 RX ORDER — AMIODARONE HYDROCHLORIDE 400 MG/1
400 TABLET ORAL 2 TIMES DAILY
Qty: 120 TABLET | Refills: 11 | Status: SHIPPED | OUTPATIENT
Start: 2025-04-23 | End: 2026-04-23

## 2025-04-23 RX ORDER — WARFARIN SODIUM 5 MG/1
5 TABLET ORAL DAILY
Qty: 135 TABLET | Refills: 3 | Status: SHIPPED | OUTPATIENT
Start: 2025-04-23 | End: 2026-04-23

## 2025-04-23 RX ADMIN — POTASSIUM & SODIUM PHOSPHATES POWDER PACK 280-160-250 MG 1 PACKET: 280-160-250 PACK at 06:04

## 2025-04-23 RX ADMIN — LEVOTHYROXINE SODIUM 125 MCG: 25 TABLET ORAL at 05:04

## 2025-04-23 RX ADMIN — AMIODARONE HYDROCHLORIDE 400 MG: 200 TABLET ORAL at 09:04

## 2025-04-23 RX ADMIN — CEFADROXIL 500 MG: 500 CAPSULE ORAL at 09:04

## 2025-04-23 RX ADMIN — MEXILETINE HYDROCHLORIDE 250 MG: 250 CAPSULE ORAL at 05:04

## 2025-04-23 RX ADMIN — PANTOPRAZOLE SODIUM 40 MG: 40 TABLET, DELAYED RELEASE ORAL at 12:04

## 2025-04-23 RX ADMIN — OMEGA-3-ACID ETHYL ESTERS 2 G: 1 CAPSULE, LIQUID FILLED ORAL at 09:04

## 2025-04-23 RX ADMIN — Medication 400 MG: at 09:04

## 2025-04-23 RX ADMIN — AMLODIPINE BESYLATE 10 MG: 10 TABLET ORAL at 09:04

## 2025-04-23 RX ADMIN — MUPIROCIN: 20 OINTMENT TOPICAL at 09:04

## 2025-04-23 NOTE — SUBJECTIVE & OBJECTIVE
Interval History: NAEO. Patient has no new complaints. No recurrence of VT. Mexiletine has been arranged.    Continuous Infusions:  Scheduled Meds:   amiodarone  400 mg Oral BID    amLODIPine  10 mg Oral Daily    cefadroxil  500 mg Oral Q12H    levothyroxine  125 mcg Oral Before breakfast    LIDOcaine (cardiac)  100 mg Intravenous Once    magnesium oxide  400 mg Oral Daily    metoprolol succinate  12.5 mg Oral BID    mexiletine  250 mg Oral Q8H    mirtazapine  45 mg Oral QHS    mupirocin   Nasal BID    omega-3 acid ethyl esters  2 g Oral BID    pantoprazole  40 mg Oral Daily with lunch    warfarin  5 mg Oral Daily     PRN Meds:  Current Facility-Administered Medications:     lorazepam, 0.5 mg, Intravenous, Q15 Min PRN    LORazepam, 0.5 mg, Oral, Q4H PRN    melatonin, 6 mg, Oral, Nightly PRN    Review of patient's allergies indicates:   Allergen Reactions    Lisinopril Anaphylaxis    Hydralazine     Hydralazine analogues      Chronic constipation, impotence, dizziness     Objective:     Vital Signs (Most Recent):  Temp: 97.1 °F (36.2 °C) (04/23/25 0700)  Pulse: (!) 55 (04/23/25 1000)  Resp: (!) 25 (04/23/25 1000)  BP: 101/67 (04/23/25 1000)  SpO2: 97 % (04/23/25 1000) Vital Signs (24h Range):  Temp:  [97.1 °F (36.2 °C)-98.4 °F (36.9 °C)] 97.1 °F (36.2 °C)  Pulse:  [52-69] 55  Resp:  [10-31] 25  SpO2:  [77 %-100 %] 97 %  BP: ()/(0-85) 101/67     Patient Vitals for the past 72 hrs (Last 3 readings):   Weight   04/23/25 0400 108.5 kg (239 lb 3.2 oz)   04/22/25 0501 108 kg (238 lb 1.6 oz)   04/21/25 0905 109 kg (240 lb 4.8 oz)     Body mass index is 31.56 kg/m².      Intake/Output Summary (Last 24 hours) at 4/23/2025 1101  Last data filed at 4/23/2025 0735  Gross per 24 hour   Intake 927.51 ml   Output 1000 ml   Net -72.49 ml            Physical Exam  Constitutional:       Appearance: Normal appearance.   HENT:      Head: Normocephalic and atraumatic.      Nose: Nose normal.      Mouth/Throat:      Mouth: Mucous  membranes are moist.   Eyes:      Pupils: Pupils are equal, round, and reactive to light.   Cardiovascular:      Rate and Rhythm: Normal rate and regular rhythm.      Pulses: Normal pulses.   Pulmonary:      Effort: No respiratory distress.   Abdominal:      General: There is no distension.      Palpations: Abdomen is soft.      Tenderness: There is no abdominal tenderness.   Musculoskeletal:         General: No signs of injury.   Skin:     General: Skin is warm and dry.   Neurological:      General: No focal deficit present.      Mental Status: He is alert and oriented to person, place, and time.   Psychiatric:         Mood and Affect: Mood normal.         Behavior: Behavior normal.            Significant Labs:  CBC:  Recent Labs   Lab 04/21/25  0305 04/22/25  0315 04/23/25  0344   WBC 4.26 4.80 4.28   RBC 4.48* 4.50* 4.39*   HGB 12.4* 12.5* 12.3*   HCT 40.2 40.5 39.4*    205 182   MCV 90 90 90   MCH 27.7 27.8 28.0   MCHC 30.8* 30.9* 31.2*     BNP:  Recent Labs   Lab 04/20/25  0827   *     CMP:  Recent Labs   Lab 04/20/25  0827 04/20/25  1656 04/21/25  0305 04/21/25  1500 04/22/25  0315 04/22/25  1524 04/23/25  0344   CALCIUM 8.9 8.7 9.1   < > 9.0 9.0 9.0   ALBUMIN 3.9 3.3* 3.2*  --   --   --  3.2*    136 137   < > 136 137 137   K 3.5 4.3 4.0   < > 4.4 4.2 4.5   CO2 22* 25 24   < > 27 26 26    100 101   < > 101 102 103   BUN 24* 23* 23*   < > 25* 23* 26*   CREATININE 2.3* 2.1* 1.9*   < > 1.8* 1.8* 2.0*   ALKPHOS 113  --  92  --   --   --  82   ALT 19  --  14  --   --   --  9*   AST 38  --  29  --   --   --  20   BILITOT 0.3  --  0.3  --   --   --  0.4    < > = values in this interval not displayed.      Coagulation:   Recent Labs   Lab 04/21/25  0305 04/22/25 0315 04/23/25  0344   INR 3.4* 2.6* 1.8*   APTT 58.3* 49.3* 42.0*     LDH:  Recent Labs   Lab 04/21/25 0305 04/22/25 0315 04/23/25  0344    401* 202     Microbiology:  Microbiology Results (last 7 days)       Procedure  Component Value Units Date/Time    Aerobic culture [[id number]] Collected: 04/21/25 0942    Order Status: Completed Specimen: Wound from Abdomen Updated: 04/22/25 0829     CULTURE, AEROBIC No Growth To Date            I have reviewed all pertinent labs within the past 24 hours.    Estimated Creatinine Clearance: 52 mL/min (A) (based on SCr of 2 mg/dL (H)).

## 2025-04-23 NOTE — PROGRESS NOTES
[facility]  Cardiothoracic Surgery  Evaluation and Management/VAD interrogation       Patient Name: Tim Richards  MRN: [id number]  Admission Date: 4/20/2025  Hospital Length of Stay: 3 days  Code Status: Full Code   Attending Physician: Isaiah Holder, *   Referring Provider: Self, Aaareferral  Principal Problem:Ventricular tachycardia            Subjective:     Post-Op Info:  * No surgery found *         Subjective:     Post-Op Info:  * No surgery found *              Interval History: Admitted for VT on 4/20. Medication per EP: transition to PO amio and off IV lidocaine.   Implanted as HM2 DT VAD on 3/8/18. Exchanged to DT HM3 on 7/13/22    Medications:  Continuous Infusions:  Scheduled Meds:   amiodarone  400 mg Oral BID    amLODIPine  10 mg Oral Daily    cefadroxil  500 mg Oral Q12H    levothyroxine  125 mcg Oral Before breakfast    LIDOcaine (cardiac)  100 mg Intravenous Once    magnesium oxide  400 mg Oral Daily    metoprolol succinate  12.5 mg Oral BID    mexiletine  250 mg Oral Q8H    mirtazapine  45 mg Oral QHS    mupirocin   Nasal BID    omega-3 acid ethyl esters  2 g Oral BID    pantoprazole  40 mg Oral Daily with lunch    warfarin  5 mg Oral Daily     PRN Meds:  Current Facility-Administered Medications:     lorazepam, 0.5 mg, Intravenous, Q15 Min PRN    LORazepam, 0.5 mg, Oral, Q4H PRN    melatonin, 6 mg, Oral, Nightly PRN     Objective:     Vital Signs (Most Recent):  Temp: 97.1 °F (36.2 °C) (04/23/25 0700)  Pulse: (!) 55 (04/23/25 1000)  Resp: (!) 25 (04/23/25 1000)  BP: 101/67 (04/23/25 1000)  SpO2: 97 % (04/23/25 1000) Vital Signs (24h Range):  Temp:  [97.1 °F (36.2 °C)-98.4 °F (36.9 °C)] 97.1 °F (36.2 °C)  Pulse:  [52-69] 55  Resp:  [10-31] 25  SpO2:  [77 %-100 %] 97 %  BP: ()/(0-85) 101/67       Intake/Output Summary (Last 24 hours) at 4/23/2025 1033  Last data filed at 4/23/2025 0735  Gross per 24 hour   Intake 935.01 ml   Output 1000 ml   Net -64.99 ml  "      Physical Exam  Constitutional:       Appearance: Normal appearance.   HENT:      Head: Normocephalic.   Eyes:      Extraocular Movements: Extraocular movements intact.   Cardiovascular:      Rate and Rhythm: Normal rate and regular rhythm.      Comments: LVAD hum is smooth  Pulmonary:      Effort: Pulmonary effort is normal.      Breath sounds: Normal breath sounds.   Abdominal:      General: Abdomen is flat.      Palpations: Abdomen is soft.   Skin:     General: Skin is warm and dry.   Neurological:      General: No focal deficit present.      Mental Status: He is alert.         Significant Labs:  CBC:   Recent Labs   Lab 04/23/25  0344   WBC 4.28   RBC 4.39*   HGB 12.3*   HCT 39.4*      MCV 90   MCH 28.0   MCHC 31.2*     CMP:   Recent Labs   Lab 04/23/25  0344   CALCIUM 9.0   ALBUMIN 3.2*      K 4.5   CO2 26      BUN 26*   CREATININE 2.0*   ALKPHOS 82   ALT 9*   AST 20   BILITOT 0.4     Coagulation:   Recent Labs   Lab 04/23/25  0344   INR 1.8*   APTT 42.0*     Lactic Acid: No results for input(s): "LACTATE" in the last 48 hours.  LFTs:   Recent Labs   Lab 04/23/25  0344   ALT 9*   AST 20   ALKPHOS 82   BILITOT 0.4   ALBUMIN 3.2*       Significant Diagnostics:  I have reviewed all pertinent imaging results/findings within the past 24 hours.    Procedure: Device Interrogation Including analysis of device parameters  Current Settings: Ventricular Assist Device  Review of device function is stable      4/23/2025     8:00 AM 4/23/2025     7:00 AM 4/23/2025     6:00 AM 4/23/2025     5:00 AM 4/23/2025     4:00 AM 4/23/2025     3:00 AM 4/23/2025     2:00 AM   TXP LVAD INTERROGATIONS   Type HeartMate3 HeartMate3        Flow 5.7 5.9 5.6 5.6 5.9 5.7 5.9   Speed 6300 6300 6300 6350 6300 6350 6300   PI 1.7 1.7 2.3 1.4 1.4 1.5 1.3   Power (Jackson) 5.4 5.5 5.2 5.5 5.5 5.5 5.5   LSL 5900 5900 5900 5950 5900 5950 5900   Pulsatility Intermittent pulse Intermittent pulse Intermittent pulse Intermittent pulse " Intermittent pulse Intermittent pulse Intermittent pulse       Assessment/Plan:     LVAD (left ventricular assist device) present  - Admitted for ICD shocks, found to have VT   - Implant Date: Exchanged to [facility] on 7/13/22  - Speed: 6300  - Low Flow Events: none   - Interval History was not obtained from team member during rounding today.  - VAD/DANIELLE teaching was not performed with patient.  - Mobilization/Physical Therapy is ongoing.  - Anticoagulation: Coumadin   - INR: 1.8  - Duiresis: home GDMT  - Urine Output: -369 ml/24 hr   - BUN: 26  - Creat: 2  - LDH: 202  - EP following VT    More than 50 percent of the care dominated counseling and coordinating care with different team members. The VAD was interrogated and no significant findings were found in the history. All these findings are documented in the note above.         Kasia Mitchell PA-C  Cardiothoracic Surgery  [facility]

## 2025-04-23 NOTE — SUBJECTIVE & OBJECTIVE
Subjective:     Post-Op Info:  * No surgery found *              Interval History: Admitted for VT on 4/20. Medication per EP: transition to PO amio and off IV lidocaine.   Implanted as HM2 DT VAD on 3/8/18. Exchanged to DT HM3 on 7/13/22    Medications:  Continuous Infusions:  Scheduled Meds:   amiodarone  400 mg Oral BID    amLODIPine  10 mg Oral Daily    cefadroxil  500 mg Oral Q12H    levothyroxine  125 mcg Oral Before breakfast    LIDOcaine (cardiac)  100 mg Intravenous Once    magnesium oxide  400 mg Oral Daily    metoprolol succinate  12.5 mg Oral BID    mexiletine  250 mg Oral Q8H    mirtazapine  45 mg Oral QHS    mupirocin   Nasal BID    omega-3 acid ethyl esters  2 g Oral BID    pantoprazole  40 mg Oral Daily with lunch    warfarin  5 mg Oral Daily     PRN Meds:  Current Facility-Administered Medications:     lorazepam, 0.5 mg, Intravenous, Q15 Min PRN    LORazepam, 0.5 mg, Oral, Q4H PRN    melatonin, 6 mg, Oral, Nightly PRN     Objective:     Vital Signs (Most Recent):  Temp: 97.1 °F (36.2 °C) (04/23/25 0700)  Pulse: (!) 55 (04/23/25 1000)  Resp: (!) 25 (04/23/25 1000)  BP: 101/67 (04/23/25 1000)  SpO2: 97 % (04/23/25 1000) Vital Signs (24h Range):  Temp:  [97.1 °F (36.2 °C)-98.4 °F (36.9 °C)] 97.1 °F (36.2 °C)  Pulse:  [52-69] 55  Resp:  [10-31] 25  SpO2:  [77 %-100 %] 97 %  BP: ()/(0-85) 101/67       Intake/Output Summary (Last 24 hours) at 4/23/2025 1033  Last data filed at 4/23/2025 0735  Gross per 24 hour   Intake 935.01 ml   Output 1000 ml   Net -64.99 ml       Physical Exam  Constitutional:       Appearance: Normal appearance.   HENT:      Head: Normocephalic.   Eyes:      Extraocular Movements: Extraocular movements intact.   Cardiovascular:      Rate and Rhythm: Normal rate and regular rhythm.      Comments: LVAD hum is smooth  Pulmonary:      Effort: Pulmonary effort is normal.      Breath sounds: Normal breath sounds.   Abdominal:      General: Abdomen is flat.      Palpations: Abdomen is  "soft.   Skin:     General: Skin is warm and dry.   Neurological:      General: No focal deficit present.      Mental Status: He is alert.         Significant Labs:  CBC:   Recent Labs   Lab 04/23/25  0344   WBC 4.28   RBC 4.39*   HGB 12.3*   HCT 39.4*      MCV 90   MCH 28.0   MCHC 31.2*     CMP:   Recent Labs   Lab 04/23/25 0344   CALCIUM 9.0   ALBUMIN 3.2*      K 4.5   CO2 26      BUN 26*   CREATININE 2.0*   ALKPHOS 82   ALT 9*   AST 20   BILITOT 0.4     Coagulation:   Recent Labs   Lab 04/23/25 0344   INR 1.8*   APTT 42.0*     Lactic Acid: No results for input(s): "LACTATE" in the last 48 hours.  LFTs:   Recent Labs   Lab 04/23/25  0344   ALT 9*   AST 20   ALKPHOS 82   BILITOT 0.4   ALBUMIN 3.2*       Significant Diagnostics:  I have reviewed all pertinent imaging results/findings within the past 24 hours.    Procedure: Device Interrogation Including analysis of device parameters  Current Settings: Ventricular Assist Device  Review of device function is stable      4/23/2025     8:00 AM 4/23/2025     7:00 AM 4/23/2025     6:00 AM 4/23/2025     5:00 AM 4/23/2025     4:00 AM 4/23/2025     3:00 AM 4/23/2025     2:00 AM   TXP LVAD INTERROGATIONS   Type HeartMate3 HeartMate3        Flow 5.7 5.9 5.6 5.6 5.9 5.7 5.9   Speed 6300 6300 6300 6350 6300 6350 6300   PI 1.7 1.7 2.3 1.4 1.4 1.5 1.3   Power (Jackson) 5.4 5.5 5.2 5.5 5.5 5.5 5.5   LSL 5900 5900 5900 5950 5900 5950 5900   Pulsatility Intermittent pulse Intermittent pulse Intermittent pulse Intermittent pulse Intermittent pulse Intermittent pulse Intermittent pulse       "

## 2025-04-23 NOTE — HOSPITAL COURSE
Patient was admitted after getting shocked several times by his ICD.  Ethanol level was 82.  He was initially on amiodarone IV and lidocaine IV and was shocked once in the ED and once in the CCU.  Amiodarone was switched to p.o..  Lidocaine was switched to mexiletine.  He had no recurrence of VT so he was discharged with instructions to return to the hospital if he had any recurrence of arrhythmia.  He was counseled on alcohol cessation.

## 2025-04-23 NOTE — PLAN OF CARE
CICU DAILY GOALS     LVAD Flows 5.7-5.9 no alarms overnight    A: Awake    RASS: Goal -    Actual - RASS (Newman Agitation-Sedation Scale): alert and calm   Restraint necessity:    B: Breath   SBT: Not intubated   C: Coordinate A & B, analgesics/sedatives   Pain: managed    SAT: Not intubated  D: Delirium   CAM-ICU:    E: Early(intubated/ Progressive (non-intubated) Mobility   MOVE Screen: Pass   Activity: Activity Management: Rolling - L1  FAS: Feeding/Nutrition   Diet order: Diet/Nutrition Received: regular,   Fluid restriction:     Nutritional Supplement Intake: Quantity, Type: Regular Diet  T: Thrombus   DVT prophylaxis: VTE Core Measure: Pharmacological prophylaxis initiated/maintained  H: HOB Elevation   Head of Bed (HOB) Positioning: HOB at 30-45 degrees  U: Ulcer Prophylaxis   GI: yes  G: Glucose control   managed    S: Skin   Bathing/Skin Care: linen changed (04/22/25 0701)  Wounds: No  Wound care consulted: No  B: Bowel Function   Constipation    I: Indwelling Catheters   [place] necessity:     CVC necessity: No  D: De-escalation Antibx   No  Family/Goals of care/Code Status   Code Status: Full Code     No acute events throughout day, VS and assessment per flow sheet, patient progressing towards goals as tolerated, plan of care reviewed with Tim Richards and family, all concerns addressed, will continue to monitor.   Problem: Ventricular Assist Device  Goal: Absence of Embolism Signs and Symptoms  Outcome: Progressing  Goal: Optimal Blood Flow  Outcome: Progressing  Goal: Absence of Infection Signs and Symptoms  Outcome: Progressing     Problem: Fall Injury Risk  Goal: Absence of Fall and Fall-Related Injury  Outcome: Progressing     Problem: Dysrhythmia  Goal: Normalized Cardiac Rhythm  Outcome: Progressing

## 2025-04-23 NOTE — DISCHARGE SUMMARY
[facility]  Heart Transplant  Discharge Summary      Patient Name: Tim Richards  MRN: [id number]  Admission Date: 4/20/2025  Hospital Length of Stay: 3 days  Discharge Date and Time: 04/23/2025 11:43 AM  Attending Physician: Isaiah Holder, *   Discharging Provider: Dayton Gomez MD  Primary Care Provider: Diego Daniel MD     HPI: 60 yo black male with stage D HFrEF s/p HM2 (implanted 3/8/2018 as DT-VAD) but required pump exchange (HM3 pump exchange 7/13/2022) who presents to the ED after getting shocked 17 times by his device. In the ED he was in VT and was started on amiodarone. When he was receiving IV metoprolol his VT rate went from 130 to 180 to 240 to VF and then he was shocked. He was awake and otherwise felt fine. He is not sure what may have caused this; he has been compliant with medications. He was recently switched from doxycycline to cefadroxil and 2 weeks ago was taken off of xanax.    He has a history of VT and is currently on amiodarone and mexiletine and metoprolol. He also has a history of amiodarone induced thyrotoxicosis. TSH today 4.7. He has a history of alcohol abuse but reports he has been sober for a few months. iSTAT labs showed K 3.5. He is volume overloaded on exam.    * No surgery found *     Hospital Course: Patient was admitted after getting shocked several times by his ICD.  Ethanol level was 82.  He was initially on amiodarone IV and lidocaine IV and was shocked once in the ED and once in the CCU.  Amiodarone was switched to p.o..  Lidocaine was switched to mexiletine.  He had no recurrence of VT so he was discharged with instructions to return to the hospital if he had any recurrence of arrhythmia.  He was counseled on alcohol cessation.    Goals of Care Treatment Preferences:  Code Status: Full Code      Consults (From admission, onward)          Status Ordering Provider     Inpatient consult to Psychiatry  Once        Provider:  (Not yet  assigned)    Completed NEERU MENG     Inpatient consult to Electrophysiology  Once        Provider:  (Not yet assigned)    Completed NEERU MENG     Inpatient consult to Endocrinology  Once        Provider:  (Not yet assigned)    Completed NEERU MENG     Inpatient consult to Registered Dietitian/Nutritionist  Once        Provider:  (Not yet assigned)    Completed NEERU MENG            Significant Diagnostic Studies: N/A    Pending Diagnostic Studies:       Procedure Component Value Units Date/Time    Basic metabolic panel [[id number]]     Order Status: Sent Lab Status: No result     Specimen: Blood     Basic metabolic panel [[id number]]     Order Status: Sent Lab Status: No result     Specimen: Blood     EKG 12-lead [[id number]]     Order Status: Sent Lab Status: No result     HCV Virus Hold Specimen [[id number]] Collected: 04/20/25 0827    Order Status: Sent Lab Status: In process Updated: 04/20/25 0833    Specimen: Blood     Lidocaine level [[id number]] Collected: 04/21/25 1948    Order Status: Sent Lab Status: In process Updated: 04/21/25 1952    Specimen: Blood           Final Active Diagnoses:    Diagnosis Date Noted POA    PRINCIPAL PROBLEM:  Ventricular tachycardia [I47.20] 01/11/2020 Yes    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  Medications:  Reconciled Home Medications:      Medication List        START taking these medications      mexiletine 250 MG Cap  Commonly known as: MEXITIL  Take 1 capsule (250 mg total) by mouth every 8 (eight) hours.            CHANGE how you take these medications      amiodarone 400 MG tablet  Commonly known as: PACERONE  Take 1 tablet (400 mg total) by mouth 2 (two) times daily.  Decrease to 400mg orally daily on May 7th  What changed:   medication strength  when to take this  additional instructions     potassium chloride SA 20 MEQ tablet  Commonly known as: K-DURKLOR-CON  Take orally daily when taking torsemide  What changed: additional instructions     warfarin 5 MG tablet  Commonly known as: COUMADIN  Take 1 tablet (5 mg total) by mouth Daily.  What changed:   how much to take  additional instructions            CONTINUE taking these medications      amLODIPine 10 MG tablet  Commonly known as: NORVASC  Take 1 tablet (10 mg total) by mouth once daily.     cefadroxil 500 MG Cap  Commonly known as: DURICEF  Take 1 capsule (500 mg total) by mouth every 12 (twelve) hours.     cyanocobalamin 1,000 mcg/mL injection  INJECT 1 ML (1,000 MCG TOTAL) INTO THE SKIN ONCE A WEEK. FOR 24 DOSES     ferrous gluconate 324 mg (37.5 mg iron) Tab tablet  TAKE 1 TABLET BY MOUTH 2 TIMES DAILY WITH MEALS.     levothyroxine 125 MCG tablet  Commonly known as: SYNTHROID  Take 1 tablet (125 mcg total) by mouth before breakfast.     magnesium oxide 400 mg (241.3 mg magnesium) tablet  Commonly known as: MAG-OX  Take 1 tablet (400 mg total) by mouth once daily.     mirtazapine 45 MG tablet  Commonly known as: REMERON  Take 1 tablet (45 mg total) by mouth every evening.     omega-3 acid ethyl esters 1 gram capsule  Commonly known as: LOVAZA  Take 2 capsules (2 g total) by mouth 2 (two) times daily.     pantoprazole 40 MG tablet  Commonly known as: PROTONIX  Take 1 tablet (40 mg total) by mouth daily with lunch.     torsemide 20 MG Tab  Commonly known as: DEMADEX  Take 1 tablets (20mg) on Mondays.     zolpidem 12.5 MG CR tablet  Commonly known as: AMBIEN CR  TAKE 1 TABLET (12.5 MG TOTAL) BY MOUTH NIGHTLY AS NEEDED FOR INSOMNIA.            STOP taking these medications      doxycycline 100 MG Cap  Commonly known as: VIBRAMYCIN     metoprolol succinate 25 MG 24 hr tablet  Commonly known as: TOPROL-XL              Dayton LUNDBERG  MD Jason  Heart Transplant  [facility]

## 2025-04-23 NOTE — PROGRESS NOTES
DISCHARGE NOTE:    Tim Richards is a 59 y.o. male s/p HM3 lvad from 3.8.2018 admitted for evaluation of VT.     Past Medical History:   Diagnosis Date    A-fib     Anticoagulant long-term use     Atrial flutter 7/30/2022    CHF (congestive heart failure)     Class 1 obesity due to excess calories with serious comorbidity and body mass index (BMI) of 31.0 to 31.9 in adult     Class 1 obesity due to excess calories with serious comorbidity and body mass index (BMI) of 32.0 to 32.9 in adult     Dilated cardiomyopathy 1/10/2018    Disorder of kidney and ureter     CKD    Encounter for blood transfusion     Essential hypertension 8/28/2022    Gout     HTN (hypertension)     Hx of psychiatric care     ICD (implantable cardioverter-defibrillator) infection 7/1/2020    Psychiatric problem     Thyroid disease     Ventricular tachycardia (paroxysmal)        Hospital Course: During his admission Mr. Richards was evaluated by the EP team. He was started on mexiletine and amiodarone (plan for 2 week oral load 400mg twice daily). Metoprolol doses were held often for bradycardia, and was discontinued.     His inr today was 1.8. A result of lower coumadin for increased inr with IV amiodarone. Plan for warfarin discharge dose of 5mg orally daily.    He continues cefadroxil antibiotic suppression.     Pharmacy Interventions/Recommendations:  1) INR Goal: 2-3    2) Antiplatelet Agents: None    3) Heparin Bridging:  UFH / LMWH     4) INR Follow-Up/Discharge Needs:  Monday with [facility]     See list of discharge medication for dosing instructions.     Tim Richards and his caregiver verbalized their understanding and had the opportunity to ask questions.      Discharge Medications:     Medication List        START taking these medications      mexiletine 250 MG Cap  Commonly known as: MEXITIL  Take 1 capsule (250 mg total) by mouth every 8 (eight) hours.            CHANGE how you take these medications      amiodarone 400  MG tablet  Commonly known as: PACERONE  Take 1 tablet (400 mg total) by mouth 2 (two) times daily. Decrease to 400mg orally daily on May 7th  What changed:   medication strength  when to take this  additional instructions     potassium chloride SA 20 MEQ tablet  Commonly known as: K-DURKLOR-CON  Take orally daily when taking torsemide  What changed: additional instructions     warfarin 5 MG tablet  Commonly known as: COUMADIN  Take 1 tablet (5 mg total) by mouth Daily.  What changed:   how much to take  additional instructions            CONTINUE taking these medications      amLODIPine 10 MG tablet  Commonly known as: NORVASC  Take 1 tablet (10 mg total) by mouth once daily.     cefadroxil 500 MG Cap  Commonly known as: DURICEF  Take 1 capsule (500 mg total) by mouth every 12 (twelve) hours.     cyanocobalamin 1,000 mcg/mL injection  INJECT 1 ML (1,000 MCG TOTAL) INTO THE SKIN ONCE A WEEK. FOR 24 DOSES     ferrous gluconate 324 mg (37.5 mg iron) Tab tablet  TAKE 1 TABLET BY MOUTH 2 TIMES DAILY WITH MEALS.     levothyroxine 125 MCG tablet  Commonly known as: SYNTHROID  Take 1 tablet (125 mcg total) by mouth before breakfast.     magnesium oxide 400 mg (241.3 mg magnesium) tablet  Commonly known as: MAG-OX  Take 1 tablet (400 mg total) by mouth once daily.     mirtazapine 45 MG tablet  Commonly known as: REMERON  Take 1 tablet (45 mg total) by mouth every evening.     omega-3 acid ethyl esters 1 gram capsule  Commonly known as: LOVAZA  Take 2 capsules (2 g total) by mouth 2 (two) times daily.     pantoprazole 40 MG tablet  Commonly known as: PROTONIX  Take 1 tablet (40 mg total) by mouth daily with lunch.     torsemide 20 MG Tab  Commonly known as: DEMADEX  Take 1 tablets (20mg) on Mondays.     zolpidem 12.5 MG CR tablet  Commonly known as: AMBIEN CR  TAKE 1 TABLET (12.5 MG TOTAL) BY MOUTH NIGHTLY AS NEEDED FOR INSOMNIA.            STOP taking these medications      doxycycline 100 MG Cap  Commonly known as:  VIBRAMYCIN     metoprolol succinate 25 MG 24 hr tablet  Commonly known as: TOPROL-XL               Where to Get Your Medications        These medications were sent to [facility]/pharmacy #[id number] - [place] - [address]  [address] [place] [address]      Phone: [phone]   amiodarone 400 MG tablet  mexiletine 250 MG Cap  potassium chloride SA 20 MEQ tablet  warfarin 5 MG tablet          yes

## 2025-04-23 NOTE — EICU
Virtual ICU Quality Rounds    Admit Date: 4/20/2025  Hospital Day: 3    ICU Day: 2d 20h    24H Vital Sign Range:  Temp:  [97.4 °F (36.3 °C)-98.4 °F (36.9 °C)]   Pulse:  [52-69]   Resp:  [10-31]   BP: ()/(0-85)   SpO2:  [77 %-100 %]     VICU Surveillance Screening    LDA reconciliation : Yes

## 2025-04-23 NOTE — ASSESSMENT & PLAN NOTE
Tolerating mexiletine with no recurrence  Discharge on amiodarone and mexiletine.  Counseled on alcohol cessation  S-icd shock turned off.

## 2025-04-23 NOTE — SUBJECTIVE & OBJECTIVE
Interval History: Patient has not episode of VT since 8:54AM 4/21. Off lidocaine yesterday and started on mexiletine.     ROS12 point ROS negative except for HPI.   Objective:     Vital Signs (Most Recent):  Temp: 97.1 °F (36.2 °C) (04/23/25 0700)  Pulse: (!) 55 (04/23/25 1000)  Resp: (!) 25 (04/23/25 1000)  BP: 101/67 (04/23/25 1000)  SpO2: 97 % (04/23/25 1000) Vital Signs (24h Range):  Temp:  [97.1 °F (36.2 °C)-98.4 °F (36.9 °C)] 97.1 °F (36.2 °C)  Pulse:  [52-69] 55  Resp:  [10-31] 25  SpO2:  [77 %-100 %] 97 %  BP: ()/(0-85) 101/67     Weight: 108.5 kg (239 lb 3.2 oz)  Body mass index is 31.56 kg/m².     SpO2: 97 %        Physical Exam   HENT:      Head: Normocephalic.      Mouth/Throat:      Mouth: Mucous membranes are moist.   Eyes:      Pupils: Pupils are equal, round, and reactive to light.   Cardiovascular:      Rate and Rhythm: Normal rate and regular rhythm.      Pulses:           Radial pulses are 2+ on the right side and 2+ on the left side.        Dorsalis pedis pulses are 2+ on the right side and 2+ on the left side.        Posterior tibial pulses are 2+ on the right side and 2+ on the left side.      Heart sounds: Normal heart sounds, S1 normal and S2 normal.      Comments: LVAD with hum heard  Pulmonary:      Effort: Pulmonary effort is normal.   Abdominal:      General: Abdomen is flat.   Musculoskeletal:         General: Normal range of motion.      Right lower leg: No edema.      Left lower leg: No edema.   Skin:     General: Skin is warm.      Capillary Refill: Capillary refill takes less than 2 seconds.   Neurological:      General: No focal deficit present.      Mental Status: He is alert.   Psychiatric:         Mood and Affect: Mood normal.     Significant Labs: All pertinent lab results from the last 24 hours have been reviewed.    Significant Imaging: Echocardiogram: Transthoracic echo (TTE) complete (Cupid Only):   Results for orders placed or performed during the hospital encounter  of 04/20/25   Echo   Result Value Ref Range    LV Diastolic Volume 134 mL    Echo EF Estimated 19 %    LV Systolic Volume 108 mL    IVS 1.3 (A) 0.6 - 1.1 cm    LVIDd 5.3 3.5 - 6.0 cm    LVIDs 4.8 (A) 2.1 - 4.0 cm    LVOT diameter 2.2 cm    PW 1.1 0.6 - 1.1 cm    RV- crump basal diam 5.4 cm    LA size 4.6 cm    Left Atrium Major Axis 5.9 cm    Left Atrium Minor Axis 6.2 cm    LA Vol (MOD) 54 mL    RA Major Axis 6.48 cm    MV Peak A Jorge 0.50 m/s    E wave deceleration time 211 ms    MV Peak E Jorge 0.60 m/s    E/A ratio 1.20     LV LATERAL E/E' RATIO 12.0     LV SEPTAL E/E' RATIO 7.5     TDI LATERAL 0.05 m/s    TDI SEPTAL 0.08 m/s    TV peak gradient 24 mmHg    TR Max Jorge 2.5 m/s    Ascending aorta 3.28 cm    STJ 3.14 cm    Sinus 3.60 cm    LA WIDTH 4.1 cm    RA Width 4.96 cm    TAPSE 1.26 cm    BSA 2.37 m2    LV Systolic Volume Index 46.6 mL/m2    LV Diastolic Volume Index 57.76 mL/m2    LVOT area 3.8 cm2    FS 9.4 (A) 28 - 44 %    Left Ventricle Relative Wall Thickness 0.42 cm    LV mass 256.6 g    LV Mass Index 110.6 g/m2    E/E' ratio 9 m/s    SARABJIT 42 mL/m2    LA Vol 97 cm3    RV/LV Ratio 1.02 cm    SARABJIT (MOD) 23 mL/m2    Mean e' 0.07 m/s    ZLVIDS -1.19     ZLVIDD -5.58     EF 20 %    TV resting pulmonary artery pressure 25 mmHg    RV TB RVSP 3 mmHg    Est. RA pres 0 mmHg    Narrative      Left Ventricle: The left ventricle is at the upper limits of normal in   size. Ventricular mass is normal. Mildly increased wall thickness. Mild   septal thickening. Severe global hypokinesis present. There is severely   reduced systolic function. Ejection fraction is approximately 20% upper   teens to low 20s).    Right Ventricle: Right ventricle was not well visualized due to poor   acoustic window. The right ventricle has moderate enlargement. Wall   thickness is normal. Systolic function is mild-moderately reduced.    Left Atrium: Moderately dilated    Pulmonary Artery: The estimated pulmonary artery systolic pressure is   25  mmHg plus RAP.    LVAD: The aortic valve does not open. The ventricular septum is at   midline.HMIII 6300.

## 2025-04-23 NOTE — NURSING
Pt given oral and written dc intructions w/verbal understanding noted.all LVAD batteries and equipment with pt as he was escorted in wc w/nad

## 2025-04-23 NOTE — EICU
Intervention Initiated From:  COR / LUIS MANUELU    Esther intervened regarding:  Rounding (Video assessment)    VICU Night Rounds Checklist  24H Vital Sign Range:  Temp:  [97.4 °F (36.3 °C)-98.4 °F (36.9 °C)]   Pulse:  [54-61]   Resp:  [12-31]   BP: ()/(0-89)   SpO2:  [77 %-100 %]     Video rounds and LDA reconciliation

## 2025-04-23 NOTE — ASSESSMENT & PLAN NOTE
Procedure: Device Interrogation Including analysis of device parameters  Current Settings: Ventricular Assist Device  Review of device function is stable/unstable stable        4/23/2025     8:00 AM 4/23/2025     7:00 AM 4/23/2025     6:00 AM 4/23/2025     5:00 AM 4/23/2025     4:00 AM 4/23/2025     3:00 AM 4/23/2025     2:00 AM   TXP LVAD INTERROGATIONS   Type HeartMate3 HeartMate3        Flow 5.7 5.9 5.6 5.6 5.9 5.7 5.9   Speed 6300 6300 6300 6350 6300 6350 6300   PI 1.7 1.7 2.3 1.4 1.4 1.5 1.3   Power (Jackson) 5.4 5.5 5.2 5.5 5.5 5.5 5.5   LSL 5900 5900 5900 5950 5900 5950 5900   Pulsatility Intermittent pulse Intermittent pulse Intermittent pulse Intermittent pulse Intermittent pulse Intermittent pulse Intermittent pulse

## 2025-04-23 NOTE — ASSESSMENT & PLAN NOTE
- Admitted for ICD shocks, found to have VT   - Implant Date: Exchanged to DT HM3 on 7/13/22  - Speed: 6300  - Low Flow Events: none   - Interval History was not obtained from team member during rounding today.  - VAD/DANIELLE teaching was not performed with patient.  - Mobilization/Physical Therapy is ongoing.  - Anticoagulation: Coumadin   - INR: 1.8  - Duiresis: home GDMT  - Urine Output: -369 ml/24 hr   - BUN: 26  - Creat: 2  - LDH: 202  - EP following VT    More than 50 percent of the care dominated counseling and coordinating care with different team members. The VAD was interrogated and no significant findings were found in the history. All these findings are documented in the note above.

## 2025-04-23 NOTE — ASSESSMENT & PLAN NOTE
59 year-old-man with stage D HFrEF s/p ICD (2014) on HM3 pump.. He also has recurrent VT as well as atrial flutter with RVR and is on chronic amiodarone. He was previously on Mexiletine but due to insurance issues, was stopped on 3/26/2025 and he was placed on Metoprolol low dose 25 mg. He is now presenting with VT and multiple shocks form his SICD. EP consulted for VT>      Recommendations:   - Continue PO   - Wean off lidocaine gtt as patient tolerates from VT standpoint. Once off can restart mexiletine.   - Increase metoprolol dose if ok from HTS standpoint.   - Consult psych to see if patient can be restarted on xanax.   - Patient's subq ICD turned off currently, patient would like to keep it off. Will discuss with HTS to get there opinion as well.   - For full recommendations, please see staff attestation.

## 2025-04-23 NOTE — PROGRESS NOTES
Dc note    Met with pt in pt's room to discuss dc today. Pt was AAOx4 with pleasant affect. Pt was amenable to dc today. Inquired if pt receives text messages from [employer] ([phone]) regarding status of medications. Pt confirmed that he does and stated he received a text message stating his Mexiletine will be available for pickup today at 6 pm. Pt also stated that he received another text message from [employer] stating his Amiodarone had to be ordered. Pt confirmed that he has both Mexiletine and Amiodarone at home until he  above meds at [facility]. Pt stated his spouse will transport him home. Pt did not verbalize any further dc needs/concerns. No dc needs indicated by medical team. Pt will have support of his spouse post-dc. Pt reports coping well and denies further needs, questions, concerns at this time and none indicated. Providing psychosocial and counseling support, education, resources, assistance and discharge planning as indicated. Notified pt's nurse, Kellee, of pt's dc today with no needs and updated on pt's Mexiletine. Kellee informed SW that pt's spouse will pickup pt from the dc lounge. SW remains available.

## 2025-04-23 NOTE — PROGRESS NOTES
[facility] - Cardiac [facility]  Heart Transplant  Progress Note    Patient Name: Tim Richards  MRN: [id number]  Admission Date: 4/20/2025  Hospital Length of Stay: 3 days  Attending Physician: Isaiah Holder, *  Primary Care Provider: Diego Daniel MD  Principal Problem:Ventricular tachycardia    Subjective:   Interval History: NAEO. Patient has no new complaints. No recurrence of VT. Mexiletine has been arranged.    Continuous Infusions:  Scheduled Meds:   amiodarone  400 mg Oral BID    amLODIPine  10 mg Oral Daily    cefadroxil  500 mg Oral Q12H    levothyroxine  125 mcg Oral Before breakfast    LIDOcaine (cardiac)  100 mg Intravenous Once    magnesium oxide  400 mg Oral Daily    metoprolol succinate  12.5 mg Oral BID    mexiletine  250 mg Oral Q8H    mirtazapine  45 mg Oral QHS    mupirocin   Nasal BID    omega-3 acid ethyl esters  2 g Oral BID    pantoprazole  40 mg Oral Daily with lunch    warfarin  5 mg Oral Daily     PRN Meds:  Current Facility-Administered Medications:     lorazepam, 0.5 mg, Intravenous, Q15 Min PRN    LORazepam, 0.5 mg, Oral, Q4H PRN    melatonin, 6 mg, Oral, Nightly PRN    Review of patient's allergies indicates:   Allergen Reactions    Lisinopril Anaphylaxis    Hydralazine     Hydralazine analogues      Chronic constipation, impotence, dizziness     Objective:     Vital Signs (Most Recent):  Temp: 97.1 °F (36.2 °C) (04/23/25 0700)  Pulse: (!) 55 (04/23/25 1000)  Resp: (!) 25 (04/23/25 1000)  BP: 101/67 (04/23/25 1000)  SpO2: 97 % (04/23/25 1000) Vital Signs (24h Range):  Temp:  [97.1 °F (36.2 °C)-98.4 °F (36.9 °C)] 97.1 °F (36.2 °C)  Pulse:  [52-69] 55  Resp:  [10-31] 25  SpO2:  [77 %-100 %] 97 %  BP: ()/(0-85) 101/67     Patient Vitals for the past 72 hrs (Last 3 readings):   Weight   04/23/25 0400 108.5 kg (239 lb 3.2 oz)   04/22/25 0501 108 kg (238 lb 1.6 oz)   04/21/25 0905 109 kg (240 lb 4.8 oz)     Body mass index is 31.56 kg/m².      Intake/Output Summary (Last  24 hours) at 4/23/2025 1101  Last data filed at 4/23/2025 0735  Gross per 24 hour   Intake 927.51 ml   Output 1000 ml   Net -72.49 ml            Physical Exam  Constitutional:       Appearance: Normal appearance.   HENT:      Head: Normocephalic and atraumatic.      Nose: Nose normal.      Mouth/Throat:      Mouth: Mucous membranes are moist.   Eyes:      Pupils: Pupils are equal, round, and reactive to light.   Cardiovascular:      Rate and Rhythm: Normal rate and regular rhythm.      Pulses: Normal pulses.   Pulmonary:      Effort: No respiratory distress.   Abdominal:      General: There is no distension.      Palpations: Abdomen is soft.      Tenderness: There is no abdominal tenderness.   Musculoskeletal:         General: No signs of injury.   Skin:     General: Skin is warm and dry.   Neurological:      General: No focal deficit present.      Mental Status: He is alert and oriented to person, place, and time.   Psychiatric:         Mood and Affect: Mood normal.         Behavior: Behavior normal.            Significant Labs:  CBC:  Recent Labs   Lab 04/21/25  0305 04/22/25  0315 04/23/25  0344   WBC 4.26 4.80 4.28   RBC 4.48* 4.50* 4.39*   HGB 12.4* 12.5* 12.3*   HCT 40.2 40.5 39.4*    205 182   MCV 90 90 90   MCH 27.7 27.8 28.0   MCHC 30.8* 30.9* 31.2*     BNP:  Recent Labs   Lab 04/20/25  0827   *     CMP:  Recent Labs   Lab 04/20/25  0827 04/20/25  1656 04/21/25  0305 04/21/25  1500 04/22/25  0315 04/22/25  1524 04/23/25  0344   CALCIUM 8.9 8.7 9.1   < > 9.0 9.0 9.0   ALBUMIN 3.9 3.3* 3.2*  --   --   --  3.2*    136 137   < > 136 137 137   K 3.5 4.3 4.0   < > 4.4 4.2 4.5   CO2 22* 25 24   < > 27 26 26    100 101   < > 101 102 103   BUN 24* 23* 23*   < > 25* 23* 26*   CREATININE 2.3* 2.1* 1.9*   < > 1.8* 1.8* 2.0*   ALKPHOS 113  --  92  --   --   --  82   ALT 19  --  14  --   --   --  9*   AST 38  --  29  --   --   --  20   BILITOT 0.3  --  0.3  --   --   --  0.4    < > = values in  this interval not displayed.      Coagulation:   Recent Labs   Lab 04/21/25  0305 04/22/25  0315 04/23/25  0344   INR 3.4* 2.6* 1.8*   APTT 58.3* 49.3* 42.0*     LDH:  Recent Labs   Lab 04/21/25  0305 04/22/25  0315 04/23/25  0344    401* 202     Microbiology:  Microbiology Results (last 7 days)       Procedure Component Value Units Date/Time    Aerobic culture [[id number]] Collected: 04/21/25 0942    Order Status: Completed Specimen: Wound from Abdomen Updated: 04/22/25 0829     CULTURE, AEROBIC No Growth To Date            I have reviewed all pertinent labs within the past 24 hours.    Estimated Creatinine Clearance: 52 mL/min (A) (based on SCr of 2 mg/dL (H)).      Assessment and Plan:     60 yo black male with stage D HFrEF s/p HM2 (implanted 3/8/2018 as DT-VAD) but required pump exchange (HM3 pump exchange 7/13/2022) who presents to the ED after getting shocked 17 times by his device. In the ED he was in VT and was started on amiodarone. When he was receiving IV metoprolol his VT rate went from 130 to 180 to 240 to VF and then he was shocked. He was awake and otherwise felt fine. He is not sure what may have caused this; he has been compliant with medications. He was recently switched from doxycycline to cefadroxil and 2 weeks ago was taken off of xanax.    He has a history of VT and is currently on amiodarone and mexiletine and metoprolol. He also has a history of amiodarone induced thyrotoxicosis. TSH today 4.7. He has a history of alcohol abuse but reports he has been sober for a few months. iSTAT labs showed K 3.5. He is volume overloaded on exam.    * Ventricular tachycardia  Tolerating mexiletine with no recurrence  Discharge on amiodarone and mexiletine.  Counseled on alcohol cessation  S-icd shock turned off.    amiodarone induced hypothyrodism  Endocrinology consulted appreciate recs.  He is essentially euthyroid and unlikely to contribute to arrhythmia  -Continue levothyroxine 125 mcg  daily  -Recheck TSH and FT4 in 3-4 weeks   -OK to continue amio from Endocrinology standpoint    LVAD (left ventricular assist device) present  Procedure: Device Interrogation Including analysis of device parameters  Current Settings: Ventricular Assist Device  Review of device function is stable/unstable stable        4/23/2025     8:00 AM 4/23/2025     7:00 AM 4/23/2025     6:00 AM 4/23/2025     5:00 AM 4/23/2025     4:00 AM 4/23/2025     3:00 AM 4/23/2025     2:00 AM   TXP LVAD INTERROGATIONS   Type HeartMate3 HeartMate3        Flow 5.7 5.9 5.6 5.6 5.9 5.7 5.9   Speed 6300 6300 6300 6350 6300 6350 6300   PI 1.7 1.7 2.3 1.4 1.4 1.5 1.3   Power (Jackson) 5.4 5.5 5.2 5.5 5.5 5.5 5.5   LSL 5900 5900 5900 5950 5900 5950 5900   Pulsatility Intermittent pulse Intermittent pulse Intermittent pulse Intermittent pulse Intermittent pulse Intermittent pulse Intermittent pulse             Dayton Gomez MD  Heart Transplant  [facility] - Cardiac Int[facility]

## 2025-04-24 ENCOUNTER — PATIENT OUTREACH (OUTPATIENT)
Dept: ADMINISTRATIVE | Facility: CLINIC | Age: 60
End: 2025-04-24
Payer: MEDICARE

## 2025-04-24 ENCOUNTER — ANTI-COAG VISIT (OUTPATIENT)
Dept: CARDIOLOGY | Facility: CLINIC | Age: 60
End: 2025-04-24
Payer: MEDICARE

## 2025-04-24 DIAGNOSIS — Z95.811 HEART REPLACED BY HEART ASSIST DEVICE: Primary | ICD-10-CM

## 2025-04-24 NOTE — PROGRESS NOTES
Pt confirmed correct dose per calendar. INR scheduled on 4/28 @ [facility] lab. Pt verbalized understanding.

## 2025-04-24 NOTE — PROGRESS NOTES
Admitted 4/20-4/23. Hospital course: Tim Richards is a 59 y.o. male s/p HM3 lvad from 3.8.2018 admitted for evaluation of VT. During his admission Mr. Richards was evaluated by the EP team. He was started on mexiletine and amiodarone (plan for 2 week oral load 400mg twice daily). Metoprolol doses were held often for bradycardia, and was discontinued. His inr today was 1.8. A result of lower coumadin for increased inr with IV amiodarone. Plan for warfarin discharge dose of 5mg orally daily. He continues cefadroxil antibiotic suppression.

## 2025-04-24 NOTE — PROGRESS NOTES
C3 nurse attempted to contact Tim Richards for a TCC post hospital discharge follow up call. No answer. LVM requesting a callback at [phone].    The patient does not have a scheduled HOSFU appointment. Message sent to PCP's staff to assist with HOSFU appointment scheduling.

## 2025-04-24 NOTE — TELEPHONE ENCOUNTER
Attempted to contact patient to assist in scheduling hospital follow up visit, no answer. Voicemail left for patient to return call.

## 2025-04-25 ENCOUNTER — PATIENT MESSAGE (OUTPATIENT)
Dept: TRANSPLANT | Facility: CLINIC | Age: 60
End: 2025-04-25
Payer: MEDICARE

## 2025-04-28 ENCOUNTER — LAB VISIT (OUTPATIENT)
Dept: LAB | Facility: HOSPITAL | Age: 60
End: 2025-04-28
Attending: INTERNAL MEDICINE
Payer: MEDICARE

## 2025-04-28 ENCOUNTER — ANTI-COAG VISIT (OUTPATIENT)
Dept: CARDIOLOGY | Facility: CLINIC | Age: 60
End: 2025-04-28
Payer: MEDICARE

## 2025-04-28 DIAGNOSIS — Z95.811 HEART REPLACED BY HEART ASSIST DEVICE: ICD-10-CM

## 2025-04-28 LAB
INR PPP: 1.5 (ref 0.8–1.2)
PROTHROMBIN TIME: 16 SECONDS (ref 9–12.5)

## 2025-04-28 PROCEDURE — 85610 PROTHROMBIN TIME: CPT

## 2025-04-28 PROCEDURE — 36415 COLL VENOUS BLD VENIPUNCTURE: CPT

## 2025-04-28 PROCEDURE — 93793 ANTICOAG MGMT PT WARFARIN: CPT | Mod: S$GLB,,,

## 2025-04-29 LAB — BACTERIA SPEC AEROBE CULT: NO GROWTH

## 2025-04-29 NOTE — PROGRESS NOTES
[facility]    2025 8:08 AM    Assessment/Plan:    Patient presents today with subtherapeutic  INR.    Assessment of patient findings and chart review: no significant findings     Recommendation for patient's warfarin regimen: Dose boosted to 7.5mg yesterday, continue maintenance dose     Recommend repeat INR Thursday  _________________________________________________________________    Tim Richards (59 y.o.) is followed by the [facility].    Anticoagulation Summary  As of 2025      INR goal:  2.0-3.0   TTR:  69.6% (5.4 y)   INR used for dosin.5 (2025)   Warfarin maintenance plan:  5 mg (5 mg x 1) every day   Weekly warfarin total:  35 mg   Plan last modified:  Pearl Mendez, PharmD (2025)   Next INR check:  2025   Target end date:  --    Indications    LVAD (left ventricular assist device) present (Resolved) [Z95.811]  Anticoagulation monitoring  INR range 2-3 (Resolved) [Z79.01]                 Anticoagulation Episode Summary       INR check location:  Clinic Lab    Preferred lab:  --    Send INR reminders to:  [facility] LVAD    Comments:  LVAD/ [facility] - [facility] Lab/Lab [place] Results:[phone] Acct# [id number] Phone: [phone] [phone]/ Bridge:NO(h/o bleeds) see 3/12 encounter for meter process          Anticoagulation Care Providers       Provider Role Specialty Phone number    Antonio Hadley Jr., MD [facility] Cardiology [phone]

## 2025-04-29 NOTE — PROGRESS NOTES
Pt stated he forgot to take Warfarin 7.5mg 4/28/2025 and only took 5mg, no other changes reported. Please advise.

## 2025-05-01 ENCOUNTER — ANTI-COAG VISIT (OUTPATIENT)
Dept: CARDIOLOGY | Facility: CLINIC | Age: 60
End: 2025-05-01
Payer: MEDICARE

## 2025-05-01 ENCOUNTER — LAB VISIT (OUTPATIENT)
Dept: LAB | Facility: HOSPITAL | Age: 60
End: 2025-05-01
Attending: INTERNAL MEDICINE
Payer: MEDICARE

## 2025-05-01 DIAGNOSIS — Z95.811 HEART REPLACED BY HEART ASSIST DEVICE: ICD-10-CM

## 2025-05-01 LAB
INR PPP: 1.6 (ref 0.8–1.2)
PROTHROMBIN TIME: 17.4 SECONDS (ref 9–12.5)

## 2025-05-01 PROCEDURE — 36415 COLL VENOUS BLD VENIPUNCTURE: CPT

## 2025-05-01 PROCEDURE — 85610 PROTHROMBIN TIME: CPT

## 2025-05-01 NOTE — PROGRESS NOTES
[facility]    2025 2:00 PM    Assessment/Plan:    Patient presents today with subtherapeutic  INR.    Assessment of patient findings and chart review: no significant findings     Recommendation for patient's warfarin regimen: Boost dose today to 7.5mg then resume current maintenance dose    Recommend repeat INR Monday  _________________________________________________________________    Tim Richards (59 y.o.) is followed by the [facility].    Anticoagulation Summary  As of 2025      INR goal:  2.0-3.0   TTR:  69.5% (5.4 y)   INR used for dosin.6 (2025)   Warfarin maintenance plan:  5 mg (5 mg x 1) every day   Weekly warfarin total:  35 mg   Plan last modified:  Pearl Mendez, PharmD (2025)   Next INR check:  2025   Target end date:  --    Indications    LVAD (left ventricular assist device) present (Resolved) [Z95.811]  Anticoagulation monitoring  INR range 2-3 (Resolved) [Z79.01]                 Anticoagulation Episode Summary       INR check location:  Clinic Lab    Preferred lab:  --    Send INR reminders to:  [facility] LVAD    Comments:  LVAD/ [facility] - [facility] Lab/Lab [place] Results:[phone] Acct# [id number] Phone: [phone]/ Bridge:NO(h/o bleeds) see 3/12 encounter for meter process          Anticoagulation Care Providers       Provider Role Specialty Phone number    Antonio Hadley Jr., MD [facility] Cardiology [phone]

## 2025-05-05 ENCOUNTER — ANTI-COAG VISIT (OUTPATIENT)
Dept: CARDIOLOGY | Facility: CLINIC | Age: 60
End: 2025-05-05
Payer: MEDICARE

## 2025-05-05 ENCOUNTER — LAB VISIT (OUTPATIENT)
Dept: LAB | Facility: HOSPITAL | Age: 60
End: 2025-05-05
Attending: INTERNAL MEDICINE
Payer: MEDICARE

## 2025-05-05 DIAGNOSIS — Z95.811 HEART REPLACED BY HEART ASSIST DEVICE: ICD-10-CM

## 2025-05-05 LAB
INR PPP: 2.1 (ref 0.8–1.2)
PROTHROMBIN TIME: 22.9 SECONDS (ref 9–12.5)

## 2025-05-05 PROCEDURE — 85610 PROTHROMBIN TIME: CPT

## 2025-05-05 PROCEDURE — 36415 COLL VENOUS BLD VENIPUNCTURE: CPT

## 2025-05-05 PROCEDURE — 93793 ANTICOAG MGMT PT WARFARIN: CPT | Mod: S$GLB,,,

## 2025-05-05 NOTE — PROGRESS NOTES
[facility]    2025 4:08 PM    Assessment/Plan:    Patient presents today with therapeutic INR.    Assessment of patient findings and chart review: no significant findings     Recommendation for patient's warfarin regimen: Continue current maintenance dose    Recommend repeat INR Thursday  _________________________________________________________________    Tim Richards (59 y.o.) is followed by the [email or web address] Management [facility].    Anticoagulation Summary  As of 2025      INR goal:  2.0-3.0   TTR:  69.4% (5.4 y)   INR used for dosin.1 (2025)   Warfarin maintenance plan:  5 mg (5 mg x 1) every day   Weekly warfarin total:  35 mg   Plan last modified:  Pearl Mendez, PharmD (2025)   Next INR check:  2025   Target end date:  --    Indications    LVAD (left ventricular assist device) present (Resolved) [Z95.811]  Anticoagulation monitoring  INR range 2-3 (Resolved) [Z79.01]                 Anticoagulation Episode Summary       INR check location:  Clinic Lab    Preferred lab:  --    Send INR reminders to:  [facility] COUM[facility]IN LVAD    Comments:  LVAD/ [facility] - [facility] Lab/Lab [place] Results:[phone] Acct# [id number] Phone: [phone]/ Bridge:NO(h/o bleeds) see 3/12 encounter for meter process          Anticoagulation Care Providers       Provider Role Specialty Phone number    Antonio Hadley Jr., MD [facility] Cardiology [phone]

## 2025-05-05 NOTE — PROGRESS NOTES
Pt declined 5/8/25 INR and stated he will have drawn on 5/9/25 at [facility] Lab by noon.INR scheduled.

## 2025-05-09 ENCOUNTER — LAB VISIT (OUTPATIENT)
Dept: LAB | Facility: HOSPITAL | Age: 60
End: 2025-05-09
Attending: INTERNAL MEDICINE
Payer: MEDICARE

## 2025-05-09 ENCOUNTER — ANTI-COAG VISIT (OUTPATIENT)
Dept: CARDIOLOGY | Facility: CLINIC | Age: 60
End: 2025-05-09
Payer: MEDICARE

## 2025-05-09 DIAGNOSIS — Z95.811 HEART REPLACED BY HEART ASSIST DEVICE: ICD-10-CM

## 2025-05-09 LAB
INR PPP: 2.3 (ref 0.8–1.2)
PROTHROMBIN TIME: 24.5 SECONDS (ref 9–12.5)

## 2025-05-09 PROCEDURE — 36415 COLL VENOUS BLD VENIPUNCTURE: CPT

## 2025-05-09 PROCEDURE — 85610 PROTHROMBIN TIME: CPT

## 2025-05-09 NOTE — PROGRESS NOTES
[facility]    2025 2:21 PM    Assessment/Plan:    Patient presents today with therapeutic INR.    Assessment of patient findings and chart review: no significant findings     Recommendation for patient's warfarin regimen: Continue current maintenance dose    Recommend repeat INR Wednesday  _________________________________________________________________    Tim Richards (59 y.o.) is followed by the [facility].    Anticoagulation Summary  As of 2025      INR goal:  2.0-3.0   TTR:  69.5% (5.4 y)   INR used for dosin.3 (2025)   Warfarin maintenance plan:  5 mg (5 mg x 1) every day   Weekly warfarin total:  35 mg   Plan last modified:  Pearl Mendez, PharmD (2025)   Next INR check:  2025   Target end date:  --    Indications    LVAD (left ventricular assist device) present (Resolved) [Z95.811]  Anticoagulation monitoring  INR range 2-3 (Resolved) [Z79.01]                 Anticoagulation Episode Summary       INR check location:  Clinic Lab    Preferred lab:  --    Send INR reminders to:  [facility] COUMADIN LVAD    Comments:  LVAD/ [facility] - [facility] Lab/Lab [place] Results:[phone] Acct# [id number] Phone: [phone]/ Bridge:NO(h/o bleeds) see 3/12 encounter for meter process          Anticoagulation Care Providers       Provider Role Specialty Phone number    Antonio Hadley Jr., MD [facility] Cardiology [phone]

## 2025-05-13 ENCOUNTER — PATIENT MESSAGE (OUTPATIENT)
Dept: TRANSPLANT | Facility: CLINIC | Age: 60
End: 2025-05-13
Payer: MEDICARE

## 2025-05-15 ENCOUNTER — ANTI-COAG VISIT (OUTPATIENT)
Dept: CARDIOLOGY | Facility: CLINIC | Age: 60
End: 2025-05-15
Payer: MEDICARE

## 2025-05-15 ENCOUNTER — LAB VISIT (OUTPATIENT)
Dept: LAB | Facility: HOSPITAL | Age: 60
End: 2025-05-15
Attending: INTERNAL MEDICINE
Payer: MEDICARE

## 2025-05-15 DIAGNOSIS — Z95.811 HEART REPLACED BY HEART ASSIST DEVICE: ICD-10-CM

## 2025-05-15 LAB
INR PPP: 2.5 (ref 0.8–1.2)
PROTHROMBIN TIME: 26.3 SECONDS (ref 9–12.5)

## 2025-05-15 PROCEDURE — 36415 COLL VENOUS BLD VENIPUNCTURE: CPT

## 2025-05-15 PROCEDURE — 85610 PROTHROMBIN TIME: CPT

## 2025-05-15 NOTE — PROGRESS NOTES
[facility]    05/15/2025 2:01 PM    Assessment/Plan:    Patient presents today with therapeutic INR.    Assessment of patient findings and chart review: no significant findings     Recommendation for patient's warfarin regimen: Continue current maintenance dose    Recommend repeat INR in 1 week  _________________________________________________________________    Tim Richards (59 y.o.) is followed by the [facility].    Anticoagulation Summary  As of 5/15/2025      INR goal:  2.0-3.0   TTR:  69.6% (5.4 y)   INR used for dosin.5 (5/15/2025)   Warfarin maintenance plan:  5 mg (5 mg x 1) every day   Weekly warfarin total:  35 mg   Plan last modified:  Pearl Mendez, PharmD (2025)   Next INR check:  2025   Target end date:  --    Indications    LVAD (left ventricular assist device) present (Resolved) [Z95.811]  Anticoagulation monitoring  INR range 2-3 (Resolved) [Z79.01]                 Anticoagulation Episode Summary       INR check location:  Clinic Lab    Preferred lab:  --    Send INR reminders to:  [facility] COUMADIN LVAD    Comments:  LVAD/ [facility]Lab [place] Results:[phone] Acct# [id number] Phone: [phone] [phone]/ Bridge:NO(h/o bleeds) see 3/12 encounter for meter process          Anticoagulation Care Providers       Provider Role Specialty Phone number    Antonio Hadley Jr., MD [facility] Cardiology [phone]

## 2025-05-19 ENCOUNTER — PATIENT MESSAGE (OUTPATIENT)
Dept: TRANSPLANT | Facility: CLINIC | Age: 60
End: 2025-05-19
Payer: MEDICARE

## 2025-05-19 DIAGNOSIS — E03.2 HYPOTHYROIDISM DUE TO AMIODARONE: ICD-10-CM

## 2025-05-19 DIAGNOSIS — T46.2X1A HYPOTHYROIDISM DUE TO AMIODARONE: ICD-10-CM

## 2025-05-20 RX ORDER — LEVOTHYROXINE SODIUM 125 UG/1
125 TABLET ORAL
Qty: 30 TABLET | Refills: 2 | Status: SHIPPED | OUTPATIENT
Start: 2025-05-20

## 2025-05-21 ENCOUNTER — PATIENT MESSAGE (OUTPATIENT)
Dept: TRANSPLANT | Facility: CLINIC | Age: 60
End: 2025-05-21
Payer: MEDICARE

## 2025-05-22 ENCOUNTER — LAB VISIT (OUTPATIENT)
Dept: LAB | Facility: HOSPITAL | Age: 60
End: 2025-05-22
Attending: INTERNAL MEDICINE
Payer: MEDICARE

## 2025-05-22 ENCOUNTER — ANTI-COAG VISIT (OUTPATIENT)
Dept: CARDIOLOGY | Facility: CLINIC | Age: 60
End: 2025-05-22
Payer: MEDICARE

## 2025-05-22 DIAGNOSIS — Z95.811 HEART REPLACED BY HEART ASSIST DEVICE: ICD-10-CM

## 2025-05-22 LAB
INR PPP: 3.1 (ref 0.8–1.2)
PROTHROMBIN TIME: 31.9 SECONDS (ref 9–12.5)

## 2025-05-22 PROCEDURE — 36415 COLL VENOUS BLD VENIPUNCTURE: CPT

## 2025-05-22 PROCEDURE — 85610 PROTHROMBIN TIME: CPT

## 2025-05-22 NOTE — PROGRESS NOTES
[facility]    05/22/2025 3:43 PM    Assessment/Plan:    Patient presents today with supratherapeutic INR.    Assessment of patient findings and chart review: no significant findings     Recommendation for patient's warfarin regimen: Lower dose today to 2.5mg then resume current maintenance dose    Recommend repeat INR Monday  _________________________________________________________________    Tim Richards (59 y.o.) is followed by the [facility].    Anticoagulation Summary  As of 5/22/2025      INR goal:  2.0-3.0   TTR:  69.6% (5.4 y)   INR used for dosing:  3.1 (5/22/2025)   Warfarin maintenance plan:  5 mg (5 mg x 1) every day   Weekly warfarin total:  35 mg   Plan last modified:  Pearl Mendez, PharmD (4/24/2025)   Next INR check:  5/26/2025   Target end date:  --    Indications    LVAD (left ventricular assist device) present (Resolved) [Z95.811]  Anticoagulation monitoring  INR range 2-3 (Resolved) [Z79.01]                 Anticoagulation Episode Summary       INR check location:  Clinic Lab    Preferred lab:  --    Send INR reminders to:  [facility] COUMADIN LVAD    Comments:  LVAD/ [facility] - [facility] Lab/Lab [place] Results:[phone] Acct# [id number] Phone: [phone] [phone]/ Bridge:NO(h/o bleeds) see 3/12 encounter for meter process          Anticoagulation Care Providers       Provider Role Specialty Phone number    Antonio Hadley Jr., MD [facility] Cardiology [phone]

## 2025-05-23 ENCOUNTER — PATIENT MESSAGE (OUTPATIENT)
Dept: TRANSPLANT | Facility: CLINIC | Age: 60
End: 2025-05-23
Payer: MEDICARE

## 2025-05-26 RX ORDER — MEXILETINE HYDROCHLORIDE 250 MG/1
250 CAPSULE ORAL EVERY 8 HOURS
Qty: 270 CAPSULE | Refills: 3 | Status: SHIPPED | OUTPATIENT
Start: 2025-05-26 | End: 2026-05-26

## 2025-05-29 ENCOUNTER — ANTI-COAG VISIT (OUTPATIENT)
Dept: CARDIOLOGY | Facility: CLINIC | Age: 60
End: 2025-05-29
Payer: MEDICARE

## 2025-05-29 ENCOUNTER — LAB VISIT (OUTPATIENT)
Dept: LAB | Facility: HOSPITAL | Age: 60
End: 2025-05-29
Attending: INTERNAL MEDICINE
Payer: MEDICARE

## 2025-05-29 DIAGNOSIS — Z95.811 HEART REPLACED BY HEART ASSIST DEVICE: ICD-10-CM

## 2025-05-29 LAB
INR PPP: 2.7 (ref 0.8–1.2)
PROTHROMBIN TIME: 28.7 SECONDS (ref 9–12.5)

## 2025-05-29 PROCEDURE — 85610 PROTHROMBIN TIME: CPT

## 2025-05-29 PROCEDURE — 36415 COLL VENOUS BLD VENIPUNCTURE: CPT

## 2025-05-29 NOTE — PROGRESS NOTES
[facility]    05/29/2025    Tim Richards (59 y.o.) is followed by the [facility].      Assessment/Plan:    Tim Richards presents with a therapeutic INR. Goal INR: 2.0-3.0    Lab Results   Component Value Date    INR 2.7 (H) 05/29/2025    INR 3.1 (H) 05/22/2025    INR 2.5 (H) 05/15/2025    PROTIME 28.7 (H) 05/29/2025    PROTIME 31.9 (H) 05/22/2025    PROTIME 26.3 (H) 05/15/2025       Assessment of patient findings per MA/LPN and chart review:   The following significant findings were found:   none    Recommendation for patient's warfarin regimen:   No change was made to warfarin therapy during this visit and patient has been instructed to continue their current warfarin regimen.    Recommended repeat INR in 1 week      Radha Eugene PharmD, BCPS  Clinical Pharmacist - [facility]  Preferred Contact: Secure Messaging or In Basket Message

## 2025-06-04 ENCOUNTER — TELEPHONE (OUTPATIENT)
Dept: FAMILY MEDICINE | Facility: CLINIC | Age: 60
End: 2025-06-04
Payer: MEDICARE

## 2025-06-04 ENCOUNTER — CLINICAL SUPPORT (OUTPATIENT)
Dept: CARDIOLOGY | Facility: HOSPITAL | Age: 60
End: 2025-06-04
Attending: STUDENT IN AN ORGANIZED HEALTH CARE EDUCATION/TRAINING PROGRAM
Payer: MEDICARE

## 2025-06-04 ENCOUNTER — HOSPITAL ENCOUNTER (OUTPATIENT)
Dept: CARDIOLOGY | Facility: CLINIC | Age: 60
Discharge: HOME | End: 2025-06-04
Payer: MEDICARE

## 2025-06-04 ENCOUNTER — OFFICE VISIT (OUTPATIENT)
Dept: ELECTROPHYSIOLOGY | Facility: CLINIC | Age: 60
End: 2025-06-04
Payer: MEDICARE

## 2025-06-04 ENCOUNTER — ANTI-COAG VISIT (OUTPATIENT)
Dept: CARDIOLOGY | Facility: CLINIC | Age: 60
End: 2025-06-04
Payer: MEDICARE

## 2025-06-04 VITALS — HEART RATE: 73 BPM | BODY MASS INDEX: 32.14 KG/M2 | WEIGHT: 242.5 LBS | HEIGHT: 73 IN

## 2025-06-04 DIAGNOSIS — I47.20 VENTRICULAR TACHYCARDIA: Primary | ICD-10-CM

## 2025-06-04 DIAGNOSIS — I42.0 DILATED CARDIOMYOPATHY: ICD-10-CM

## 2025-06-04 DIAGNOSIS — I47.20 VT (VENTRICULAR TACHYCARDIA): ICD-10-CM

## 2025-06-04 DIAGNOSIS — Z95.811 LVAD (LEFT VENTRICULAR ASSIST DEVICE) PRESENT: Chronic | ICD-10-CM

## 2025-06-04 DIAGNOSIS — N18.32 STAGE 3B CHRONIC KIDNEY DISEASE: ICD-10-CM

## 2025-06-04 LAB
OHS QRS DURATION: 162 MS
OHS QTC CALCULATION: 599 MS

## 2025-06-04 PROCEDURE — 99999 PR PBB SHADOW E&M-EST. PATIENT-LVL III: CPT | Mod: PBBFAC,,, | Performed by: INTERNAL MEDICINE

## 2025-06-04 PROCEDURE — 93260 PRGRMG DEV EVAL IMPLTBL SYS: CPT

## 2025-06-04 PROCEDURE — 93010 ELECTROCARDIOGRAM REPORT: CPT | Mod: S$GLB,,, | Performed by: INTERNAL MEDICINE

## 2025-06-04 PROCEDURE — 93005 ELECTROCARDIOGRAM TRACING: CPT | Mod: S$GLB,,, | Performed by: STUDENT IN AN ORGANIZED HEALTH CARE EDUCATION/TRAINING PROGRAM

## 2025-06-04 RX ORDER — ALPRAZOLAM 1 MG/1
1 TABLET ORAL 2 TIMES DAILY PRN
COMMUNITY
Start: 2025-04-17

## 2025-06-04 RX ORDER — MEXILETINE HYDROCHLORIDE 250 MG/1
CAPSULE ORAL
COMMUNITY

## 2025-06-05 ENCOUNTER — ANTI-COAG VISIT (OUTPATIENT)
Dept: CARDIOLOGY | Facility: CLINIC | Age: 60
End: 2025-06-05
Payer: MEDICARE

## 2025-06-05 ENCOUNTER — LAB VISIT (OUTPATIENT)
Dept: LAB | Facility: HOSPITAL | Age: 60
End: 2025-06-05
Attending: INTERNAL MEDICINE
Payer: MEDICARE

## 2025-06-05 DIAGNOSIS — Z95.811 HEART REPLACED BY HEART ASSIST DEVICE: ICD-10-CM

## 2025-06-05 LAB
INR PPP: 1.1 (ref 0.8–1.2)
PROTHROMBIN TIME: 12.4 SECONDS (ref 9–12.5)

## 2025-06-05 PROCEDURE — 36415 COLL VENOUS BLD VENIPUNCTURE: CPT

## 2025-06-05 PROCEDURE — 85610 PROTHROMBIN TIME: CPT

## 2025-06-06 ENCOUNTER — TELEPHONE (OUTPATIENT)
Dept: FAMILY MEDICINE | Facility: CLINIC | Age: 60
End: 2025-06-06

## 2025-06-06 ENCOUNTER — PATIENT MESSAGE (OUTPATIENT)
Dept: FAMILY MEDICINE | Facility: CLINIC | Age: 60
End: 2025-06-06
Payer: MEDICARE

## 2025-06-06 NOTE — TELEPHONE ENCOUNTER
Patient requesting a call from Dr. Daniel.  Stated he was communicating with provider, but then message was deleted.  Please advise.

## 2025-06-06 NOTE — TELEPHONE ENCOUNTER
----- Message from Fabiana sent at 6/6/2025  3:30 PM CDT -----  Regarding: Tim  Who called:Kirk is the request in detail: Patient says he was communicating with the doctor and the message was deleted and would like to have the doctor call himCan the clinic reply by [facility]? No Would the patient rather a call back or a response via [facility]? CallbackBest call back number:.[phone] Information:

## 2025-06-09 ENCOUNTER — ANTI-COAG VISIT (OUTPATIENT)
Dept: CARDIOLOGY | Facility: CLINIC | Age: 60
End: 2025-06-09
Payer: MEDICARE

## 2025-06-09 ENCOUNTER — PATIENT MESSAGE (OUTPATIENT)
Dept: TRANSPLANT | Facility: CLINIC | Age: 60
End: 2025-06-09
Payer: MEDICARE

## 2025-06-09 ENCOUNTER — LAB VISIT (OUTPATIENT)
Dept: LAB | Facility: HOSPITAL | Age: 60
End: 2025-06-09
Attending: INTERNAL MEDICINE
Payer: MEDICARE

## 2025-06-09 DIAGNOSIS — Z95.811 HEART REPLACED BY HEART ASSIST DEVICE: ICD-10-CM

## 2025-06-09 LAB
INR PPP: 1.7 (ref 0.8–1.2)
PROTHROMBIN TIME: 18.4 SECONDS (ref 9–12.5)

## 2025-06-09 PROCEDURE — 93793 ANTICOAG MGMT PT WARFARIN: CPT | Mod: S$GLB,,,

## 2025-06-09 PROCEDURE — 36415 COLL VENOUS BLD VENIPUNCTURE: CPT

## 2025-06-09 PROCEDURE — 85610 PROTHROMBIN TIME: CPT

## 2025-06-09 NOTE — PROGRESS NOTES
[facility]    2025 3:43 PM    Assessment/Plan:    Patient presents today with subtherapeutic  INR.    Assessment of patient findings and chart review: no significant findings     Recommendation for patient's warfarin regimen: Boost dose today to 7.5mg then resume current maintenance dose    Recommend repeat INR Thursday  _________________________________________________________________    Tim Richards (59 y.o.) is followed by the [facility].    Anticoagulation Summary  As of 2025      INR goal:  2.0-3.0   TTR:  69.4% (5.5 y)   INR used for dosin.7 (2025)   Warfarin maintenance plan:  5 mg (5 mg x 1) every day   Weekly warfarin total:  35 mg   Plan last modified:  Pearl Mendez, PharmD (2025)   Next INR check:  2025   Target end date:  --    Indications    LVAD (left ventricular assist device) present (Resolved) [Z95.811]  Anticoagulation monitoring  INR range 2-3 (Resolved) [Z79.01]                 Anticoagulation Episode Summary       INR check location:  Clinic Lab    Preferred lab:  --    Send INR reminders to:  [facility] COUMADIN LVAD    Comments:  LVAD/ [facility] [place] [facility] Lab/Lab [place] Results:[phone] Acct# [id number] Phone: [phone]/ Bridge:NO(h/o bleeds) see 3/12 encounter for meter process          Anticoagulation Care Providers       Provider Role Specialty Phone number    Antonio Hadley Jr., MD [facility] Cardiology [phone]

## 2025-06-12 ENCOUNTER — LAB VISIT (OUTPATIENT)
Dept: LAB | Facility: HOSPITAL | Age: 60
End: 2025-06-12
Attending: INTERNAL MEDICINE
Payer: MEDICARE

## 2025-06-12 ENCOUNTER — ANTI-COAG VISIT (OUTPATIENT)
Dept: CARDIOLOGY | Facility: CLINIC | Age: 60
End: 2025-06-12
Payer: MEDICARE

## 2025-06-12 DIAGNOSIS — Z95.811 HEART REPLACED BY HEART ASSIST DEVICE: ICD-10-CM

## 2025-06-12 LAB
INR PPP: 2.9 (ref 0.8–1.2)
PROTHROMBIN TIME: 30.2 SECONDS (ref 9–12.5)

## 2025-06-12 PROCEDURE — 36415 COLL VENOUS BLD VENIPUNCTURE: CPT

## 2025-06-12 PROCEDURE — 85610 PROTHROMBIN TIME: CPT

## 2025-06-13 ENCOUNTER — PATIENT MESSAGE (OUTPATIENT)
Dept: CARDIOTHORACIC SURGERY | Facility: CLINIC | Age: 60
End: 2025-06-13
Payer: MEDICARE

## 2025-06-13 DIAGNOSIS — Z95.811 LVAD (LEFT VENTRICULAR ASSIST DEVICE) PRESENT: Primary | ICD-10-CM

## 2025-06-13 NOTE — PROGRESS NOTES
[facility]    2025 8:11 AM    Assessment/Plan:    Patient presents today with therapeutic INR.    Assessment of patient findings and chart review: no significant findings     Recommendation for patient's warfarin regimen: Continue current maintenance dose    Recommend repeat INR Monday  _________________________________________________________________    Tim Richards (59 y.o.) is followed by the [facility].    Anticoagulation Summary  As of 2025      INR goal:  2.0-3.0   TTR:  69.4% (5.5 y)   INR used for dosin.9 (2025)   Warfarin maintenance plan:  5 mg (5 mg x 1) every day   Weekly warfarin total:  35 mg   Plan last modified:  Pearl Mendez, PharmD (2025)   Next INR check:  2025   Target end date:  --    Indications    LVAD (left ventricular assist device) present (Resolved) [Z95.811]  Anticoagulation monitoring  INR range 2-3 (Resolved) [Z79.01]                 Anticoagulation Episode Summary       INR check location:  Clinic Lab    Preferred lab:  --    Send INR reminders to:  [facility] COUMADIN LVAD    Comments:  LVAD/ [facility]/Lab [place] Results:[phone] Acct# [id number] Phone: [phone] [phone]/ Bridge:NO(h/o bleeds) see 3/12 encounter for meter process          Anticoagulation Care Providers       Provider Role Specialty Phone number    Antonio Hadley Jr., MD [facility] Cardiology [phone]

## 2025-06-16 ENCOUNTER — LAB VISIT (OUTPATIENT)
Dept: LAB | Facility: HOSPITAL | Age: 60
End: 2025-06-16
Attending: INTERNAL MEDICINE
Payer: MEDICARE

## 2025-06-16 ENCOUNTER — ANTI-COAG VISIT (OUTPATIENT)
Dept: CARDIOLOGY | Facility: CLINIC | Age: 60
End: 2025-06-16
Payer: MEDICARE

## 2025-06-16 DIAGNOSIS — Z95.811 HEART REPLACED BY HEART ASSIST DEVICE: ICD-10-CM

## 2025-06-16 LAB
INR PPP: 3.7 (ref 0.8–1.2)
PROTHROMBIN TIME: 37.5 SECONDS (ref 9–12.5)

## 2025-06-16 PROCEDURE — 36415 COLL VENOUS BLD VENIPUNCTURE: CPT

## 2025-06-16 PROCEDURE — 85610 PROTHROMBIN TIME: CPT

## 2025-06-16 PROCEDURE — 93793 ANTICOAG MGMT PT WARFARIN: CPT | Mod: S$GLB,,,

## 2025-06-16 NOTE — PROGRESS NOTES
Patient reports missed 6/14/25 Warfarin dose, took Warfarin 10mg 6/15/2025 on his own, took correct Warfarin dose all other days, green tea 6/14/25, no other changes reported.

## 2025-06-16 NOTE — PROGRESS NOTES
[facility]    06/16/2025 1:55 PM    Assessment/Plan:    Patient presents today with supratherapeutic INR.    Assessment of patient findings and chart review: reports missing Saturday's dose and doubling dose on own on Sunday    Recommendation for patient's warfarin regimen: Lower dose today to 2.5mg then resume current maintenance dose    Recommend repeat INR Thursday  _________________________________________________________________    Tim Richards (59 y.o.) is followed by the [facility].    Anticoagulation Summary  As of 6/16/2025      INR goal:  2.0-3.0   TTR:  69.3% (5.5 y)   INR used for dosing:  3.7 (6/16/2025)   Warfarin maintenance plan:  5 mg (5 mg x 1) every day   Weekly warfarin total:  35 mg   Plan last modified:  Pearl Mendez, PharmD (4/24/2025)   Next INR check:  6/19/2025   Target end date:  --    Indications    LVAD (left ventricular assist device) present (Resolved) [Z95.811]  Anticoagulation monitoring  INR range 2-3 (Resolved) [Z79.01]                 Anticoagulation Episode Summary       INR check location:  Clinic Lab    Preferred lab:  --    Send INR reminders to:  [facility] COUMADIN LVAD    Comments:  LVAD/ [facility] Lab/Lab [place] [phone] [id number] Phone: [phone]/ Bridge:NO(h/o bleeds) see 3/12 encounter for meter process          Anticoagulation Care Providers       Provider Role Specialty Phone number    Antonio Hadley Jr., MD [facility] Cardiology [phone]

## 2025-06-18 DIAGNOSIS — G47.00 INSOMNIA, UNSPECIFIED TYPE: Primary | ICD-10-CM

## 2025-06-19 ENCOUNTER — LAB VISIT (OUTPATIENT)
Dept: LAB | Facility: HOSPITAL | Age: 60
End: 2025-06-19
Attending: INTERNAL MEDICINE
Payer: MEDICARE

## 2025-06-19 ENCOUNTER — ANTI-COAG VISIT (OUTPATIENT)
Dept: CARDIOLOGY | Facility: CLINIC | Age: 60
End: 2025-06-19
Payer: MEDICARE

## 2025-06-19 DIAGNOSIS — Z95.811 HEART REPLACED BY HEART ASSIST DEVICE: ICD-10-CM

## 2025-06-19 LAB
INR PPP: 3.5 (ref 0.8–1.2)
PROTHROMBIN TIME: 35.8 SECONDS (ref 9–12.5)

## 2025-06-19 PROCEDURE — 85610 PROTHROMBIN TIME: CPT

## 2025-06-19 PROCEDURE — 36415 COLL VENOUS BLD VENIPUNCTURE: CPT

## 2025-06-19 NOTE — PROGRESS NOTES
Patient reports correct Warfarin dose, flaxseed blueberry muffins for the past 2 days, mixed lettuce salad 6/18/2025, no other changes reported.

## 2025-06-19 NOTE — PROGRESS NOTES
[facility]    06/19/2025 3:37 PM    Assessment/Plan:    Patient presents today with supratherapeutic INR.    Assessment of patient findings and chart review: no significant findings     Recommendation for patient's warfarin regimen: Lower dose today to 2.5mg then resume current maintenance dose    Recommend repeat INR Monday  _________________________________________________________________    Tim Richards (59 y.o.) is followed by the [facility].    Anticoagulation Summary  As of 6/19/2025      INR goal:  2.0-3.0   TTR:  69.2% (5.5 y)   INR used for dosing:  3.5 (6/19/2025)   Warfarin maintenance plan:  5 mg (5 mg x 1) every day   Weekly warfarin total:  35 mg   Plan last modified:  Pearl Mendez, PharmD (4/24/2025)   Next INR check:  6/23/2025   Target end date:  --    Indications    LVAD (left ventricular assist device) present (Resolved) [Z95.811]  Anticoagulation monitoring  INR range 2-3 (Resolved) [Z79.01]                 Anticoagulation Episode Summary       INR check location:  Clinic Lab    Preferred lab:  --    Send INR reminders to:  [facility] COUMADIN LVAD    Comments:  LVAD/ [facility] Lab/Lab [place] Results:[phone] Acct# [id number] Phone: [phone]/ Bridge:NO(h/o bleeds) see 3/12 encounter for meter process          Anticoagulation Care Providers       Provider Role Specialty Phone number    Antonio Hadley Jr., MD [facility] Cardiology [phone]

## 2025-06-19 NOTE — TELEPHONE ENCOUNTER
No care due was identified.  [facility] Embedded Care Due Messages. Reference number: [id number].   6/18/2025 7:43:19 PM CDT

## 2025-06-24 ENCOUNTER — ANTI-COAG VISIT (OUTPATIENT)
Dept: CARDIOLOGY | Facility: CLINIC | Age: 60
End: 2025-06-24
Payer: MEDICARE

## 2025-06-24 ENCOUNTER — LAB VISIT (OUTPATIENT)
Dept: LAB | Facility: HOSPITAL | Age: 60
End: 2025-06-24
Attending: INTERNAL MEDICINE
Payer: MEDICARE

## 2025-06-24 DIAGNOSIS — Z95.811 HEART REPLACED BY HEART ASSIST DEVICE: ICD-10-CM

## 2025-06-24 LAB
INR PPP: 1.7 (ref 0.8–1.2)
PROTHROMBIN TIME: 18.6 SECONDS (ref 9–12.5)

## 2025-06-24 PROCEDURE — 85610 PROTHROMBIN TIME: CPT

## 2025-06-24 PROCEDURE — 36415 COLL VENOUS BLD VENIPUNCTURE: CPT

## 2025-06-24 PROCEDURE — 93793 ANTICOAG MGMT PT WARFARIN: CPT | Mod: S$GLB,,,

## 2025-06-24 NOTE — PROGRESS NOTES
Patient reports missing 6/19/25 and 6/22/25 Warfarin doses, took correct Warfarin dose other days, mixed green salad 6/23/2025, no other changes reported.

## 2025-06-24 NOTE — PROGRESS NOTES
[facility]    2025 3:45 PM    Assessment/Plan:    Patient presents today with subtherapeutic  INR.    Assessment of patient findings and chart review: reports missing 2 doses     Recommendation for patient's warfarin regimen: Boost dose today to 7.5mg then resume current maintenance dose    Recommend repeat INR Thursday  _________________________________________________________________    Tim Richards (59 y.o.) is followed by the [facility].    Anticoagulation Summary  As of 2025      INR goal:  2.0-3.0   TTR:  69.2% (5.5 y)   INR used for dosin.7 (2025)   Warfarin maintenance plan:  5 mg (5 mg x 1) every day   Weekly warfarin total:  35 mg   Plan last modified:  Pearl Mendez, PharmD (2025)   Next INR check:  2025   Target end date:  --    Indications    LVAD (left ventricular assist device) present (Resolved) [Z95.811]  Anticoagulation monitoring  INR range 2-3 (Resolved) [Z79.01]                 Anticoagulation Episode Summary       INR check location:  Clinic Lab    Preferred lab:  --    Send INR reminders to:  [facility] COUMADIN LVAD    Comments:  LVAD/ [facility] Lab/Lab [place] Results:[phone] Acct# [id number] Phone: [phone]/ Bridge:NO(h/o bleeds) see 3/12 encounter for meter process          Anticoagulation Care Providers       Provider Role Specialty Phone number    Antonio Hadley Jr., MD [facility] Cardiology [phone]

## 2025-06-26 ENCOUNTER — ANTI-COAG VISIT (OUTPATIENT)
Dept: CARDIOLOGY | Facility: CLINIC | Age: 60
End: 2025-06-26
Payer: MEDICARE

## 2025-06-26 ENCOUNTER — LAB VISIT (OUTPATIENT)
Dept: LAB | Facility: HOSPITAL | Age: 60
End: 2025-06-26
Attending: INTERNAL MEDICINE
Payer: MEDICARE

## 2025-06-26 DIAGNOSIS — Z95.811 HEART REPLACED BY HEART ASSIST DEVICE: ICD-10-CM

## 2025-06-26 LAB
INR PPP: 1.4 (ref 0.8–1.2)
PROTHROMBIN TIME: 15.5 SECONDS (ref 9–12.5)

## 2025-06-26 PROCEDURE — 36415 COLL VENOUS BLD VENIPUNCTURE: CPT

## 2025-06-26 PROCEDURE — 85610 PROTHROMBIN TIME: CPT

## 2025-06-26 NOTE — PROGRESS NOTES
Patient reports taking Warfarin 5mg only on 6/24/25 on his own, took correct Warfarin dose all other days, salad 6/25/2025, no other changes reported.

## 2025-06-26 NOTE — PROGRESS NOTES
[facility]    2025 4:14 PM    Assessment/Plan:    Patient presents today with subtherapeutic  INR.    Assessment of patient findings and chart review: Did not boost dose on Tuesday as advised    Recommendation for patient's warfarin regimen: Boost dose today to 10mg then resume current maintenance dose    Recommend repeat INR Monday  _________________________________________________________________    Tim Richards (59 y.o.) is followed by the [facility].    Anticoagulation Summary  As of 2025      INR goal:  2.0-3.0   TTR:  69.1% (5.5 y)   INR used for dosin.4 (2025)   Warfarin maintenance plan:  5 mg (5 mg x 1) every day   Weekly warfarin total:  35 mg   Plan last modified:  Pearl Mendez, PharmD (2025)   Next INR check:  2025   Target end date:  --    Indications    LVAD (left ventricular assist device) present (Resolved) [Z95.811]  Anticoagulation monitoring  INR range 2-3 (Resolved) [Z79.01]                 Anticoagulation Episode Summary       INR check location:  Clinic Lab    Preferred lab:  --    Send INR reminders to:  [facility] COUMADIN LVAD    Comments:  LVAD/ [facility] - [facility] [place] Lab/Lab [place] Results:[phone] Acct# [id number] Phone: [phone]/ Bridge:NO(h/o bleeds) see 3/12 encounter for meter process          Anticoagulation Care Providers       Provider Role Specialty Phone number    Antonio Hadley Jr., MD [facility] Cardiology [phone]

## 2025-06-27 ENCOUNTER — PATIENT MESSAGE (OUTPATIENT)
Dept: PSYCHIATRY | Facility: CLINIC | Age: 60
End: 2025-06-27
Payer: MEDICARE

## 2025-06-27 RX ORDER — BUSPIRONE HYDROCHLORIDE 5 MG/1
5 TABLET ORAL 2 TIMES DAILY
Qty: 60 TABLET | Refills: 2 | Status: SHIPPED | OUTPATIENT
Start: 2025-06-27 | End: 2025-09-25

## 2025-07-01 ENCOUNTER — ANTI-COAG VISIT (OUTPATIENT)
Dept: CARDIOLOGY | Facility: CLINIC | Age: 60
End: 2025-07-01
Payer: MEDICARE

## 2025-07-01 ENCOUNTER — LAB VISIT (OUTPATIENT)
Dept: LAB | Facility: HOSPITAL | Age: 60
End: 2025-07-01
Attending: INTERNAL MEDICINE
Payer: MEDICARE

## 2025-07-01 DIAGNOSIS — Z95.811 HEART REPLACED BY HEART ASSIST DEVICE: ICD-10-CM

## 2025-07-01 LAB
INR PPP: 1.5 (ref 0.8–1.2)
PROTHROMBIN TIME: 16.7 SECONDS (ref 9–12.5)

## 2025-07-01 PROCEDURE — 85610 PROTHROMBIN TIME: CPT

## 2025-07-01 PROCEDURE — 93793 ANTICOAG MGMT PT WARFARIN: CPT | Mod: S$GLB,,,

## 2025-07-01 PROCEDURE — 36415 COLL VENOUS BLD VENIPUNCTURE: CPT

## 2025-07-01 NOTE — PROGRESS NOTES
Patient reports correct Warfarin dose, started Buspirone last week, Benadryl 6/28/25, no other changes reported.

## 2025-07-01 NOTE — PROGRESS NOTES
[facility]    07/01/2025    Tim Richards (59 y.o.) is followed by the [facility].      Assessment/Plan:    Tim Richards presents with a subtherapeutic  INR. Goal INR: 2.0-3.0    Lab Results   Component Value Date    INR 1.5 (H) 07/01/2025    INR 1.4 (H) 06/26/2025    INR 1.7 (H) 06/24/2025    PROTIME 16.7 (H) 07/01/2025    PROTIME 15.5 (H) 06/26/2025    PROTIME 18.6 (H) 06/24/2025       Assessment of patient findings per MA/LPN and chart review:   The following significant findings were found:   none    Recommendation for patient's warfarin regimen:   Weekly warfarin dose increased due to repeated subtherapeutic INRs.  Patient also instructed to take a booster dose tomorrow    Recommended repeat INR in 2 days      Radha Eugene PharmD, BCPS  Clinical Pharmacist - [facility] Anticoagulation Management Program  Preferred Contact: Secure Messaging or In Basket Message

## 2025-07-03 ENCOUNTER — LAB VISIT (OUTPATIENT)
Dept: LAB | Facility: HOSPITAL | Age: 60
End: 2025-07-03
Attending: INTERNAL MEDICINE
Payer: MEDICARE

## 2025-07-03 ENCOUNTER — ANTI-COAG VISIT (OUTPATIENT)
Dept: CARDIOLOGY | Facility: CLINIC | Age: 60
End: 2025-07-03
Payer: MEDICARE

## 2025-07-03 DIAGNOSIS — Z95.811 HEART REPLACED BY HEART ASSIST DEVICE: ICD-10-CM

## 2025-07-03 LAB
INR PPP: 1.8 (ref 0.8–1.2)
PROTHROMBIN TIME: 19.7 SECONDS (ref 9–12.5)

## 2025-07-03 PROCEDURE — 85610 PROTHROMBIN TIME: CPT

## 2025-07-03 PROCEDURE — 36415 COLL VENOUS BLD VENIPUNCTURE: CPT

## 2025-07-03 NOTE — PROGRESS NOTES
[facility]    07/03/2025    Tim Richards (59 y.o.) is followed by the [facility].      Assessment/Plan:    Tim Richards presents with a subtherapeutic  INR. Goal INR: 2.0-3.0    Lab Results   Component Value Date    INR 1.8 (H) 07/03/2025    INR 1.5 (H) 07/01/2025    INR 1.4 (H) 06/26/2025    PROTIME 19.7 (H) 07/03/2025    PROTIME 16.7 (H) 07/01/2025    PROTIME 15.5 (H) 06/26/2025       Assessment of patient findings per MA/LPN and chart review:   The following significant findings were found:   none    Recommendation for patient's warfarin regimen:   Due to subtherapeutic INR, patient was instructed to take a booster dose today. Patient to resume their normal weekly dose.    Recommended repeat INR in 4 days      Radha Eugene, PharmD, BCPS  Clinical Pharmacist - [facility]ticoagulation Management Program  Preferred Contact: Secure Messaging or In Basket Message

## 2025-07-05 RX ORDER — ZOLPIDEM TARTRATE 12.5 MG/1
12.5 TABLET, FILM COATED, EXTENDED RELEASE ORAL NIGHTLY PRN
Qty: 30 TABLET | Refills: 5 | Status: SHIPPED | OUTPATIENT
Start: 2025-07-05 | End: 2026-01-03

## 2025-07-08 ENCOUNTER — LAB VISIT (OUTPATIENT)
Dept: LAB | Facility: HOSPITAL | Age: 60
End: 2025-07-08
Attending: INTERNAL MEDICINE
Payer: MEDICARE

## 2025-07-08 ENCOUNTER — PATIENT MESSAGE (OUTPATIENT)
Dept: TRANSPLANT | Facility: CLINIC | Age: 60
End: 2025-07-08
Payer: MEDICARE

## 2025-07-08 ENCOUNTER — ANTI-COAG VISIT (OUTPATIENT)
Dept: CARDIOLOGY | Facility: CLINIC | Age: 60
End: 2025-07-08
Payer: MEDICARE

## 2025-07-08 DIAGNOSIS — Z95.811 HEART REPLACED BY HEART ASSIST DEVICE: ICD-10-CM

## 2025-07-08 LAB
INR PPP: 3.1 (ref 0.8–1.2)
PROTHROMBIN TIME: 31.9 SECONDS (ref 9–12.5)

## 2025-07-08 PROCEDURE — 85610 PROTHROMBIN TIME: CPT

## 2025-07-08 PROCEDURE — 93793 ANTICOAG MGMT PT WARFARIN: CPT | Mod: S$GLB,,,

## 2025-07-08 PROCEDURE — 36415 COLL VENOUS BLD VENIPUNCTURE: CPT

## 2025-07-08 NOTE — PROGRESS NOTES
[facility]    07/08/2025 2:34 PM    Assessment/Plan:    Patient presents today with supratherapeutic INR.    Assessment of patient findings and chart review: took higher dose on Sunday than prescribed    Recommendation for patient's warfarin regimen: Lower dose today to 5mg then resume current maintenance dose    Recommend repeat INR Thursday  _________________________________________________________________    Tim Richards (59 y.o.) is followed by the [facility].    Anticoagulation Summary  As of 7/8/2025      INR goal:  2.0-3.0   TTR:  68.9% (5.6 y)   INR used for dosing:  3.1 (7/8/2025)   Warfarin maintenance plan:  7.5 mg (5 mg x 1.5) every Tue, Thu; 5 mg (5 mg x 1) all other days   Weekly warfarin total:  40 mg   Plan last modified:  Radha Eugene, PharmD (7/1/2025)   Next INR check:  7/10/2025   Target end date:  --    Indications    LVAD (left ventricular assist device) present (Resolved) [Z95.811]  Anticoagulation monitoring  INR range 2-3 (Resolved) [Z79.01]                 Anticoagulation Episode Summary       INR check location:  Clinic Lab    Preferred lab:  --    Send INR reminders to:  [facility] COUMADIN LVAD    Comments:  LVAD/ [facility] - [facility] Lab/Lab [place] Results:[phone] [id number] Phone: [phone]/ Bridge:NO(h/o bleeds) see 3/12 encounter for meter process          Anticoagulation Care Providers       Provider Role Specialty Phone number    Antonio Hadley Jr., MD Responsible Cardiology [phone]

## 2025-07-08 NOTE — PROGRESS NOTES
Patient reports taking Warfarin 7.5mg on 7/6/25 on his own, took correct Warfarin dose all other, stated he does not have a pill cutter and may not be slicing his pills in half evenly, no other changes reported.

## 2025-07-10 ENCOUNTER — LAB VISIT (OUTPATIENT)
Dept: LAB | Facility: HOSPITAL | Age: 60
End: 2025-07-10
Attending: INTERNAL MEDICINE
Payer: MEDICARE

## 2025-07-10 ENCOUNTER — ANTI-COAG VISIT (OUTPATIENT)
Dept: CARDIOLOGY | Facility: CLINIC | Age: 60
End: 2025-07-10
Payer: MEDICARE

## 2025-07-10 DIAGNOSIS — Z95.811 HEART REPLACED BY HEART ASSIST DEVICE: ICD-10-CM

## 2025-07-10 LAB
INR PPP: 2.7 (ref 0.8–1.2)
PROTHROMBIN TIME: 28.6 SECONDS (ref 9–12.5)

## 2025-07-10 PROCEDURE — 85610 PROTHROMBIN TIME: CPT

## 2025-07-10 PROCEDURE — 36415 COLL VENOUS BLD VENIPUNCTURE: CPT

## 2025-07-10 PROCEDURE — 93793 ANTICOAG MGMT PT WARFARIN: CPT | Mod: S$GLB,,,

## 2025-07-10 NOTE — PROGRESS NOTES
[facility]    07/10/2025 3:44 PM    Assessment/Plan:    Patient presents today with therapeutic INR.    Assessment of patient findings and chart review: no significant findings     Recommendation for patient's warfarin regimen: Continue current maintenance dose    Recommend repeat INR Monday  _________________________________________________________________    Tim Richards (59 y.o.) is followed by the [facility].    Anticoagulation Summary  As of 7/10/2025      INR goal:  2.0-3.0   TTR:  68.9% (5.6 y)   INR used for dosin.7 (7/10/2025)   Warfarin maintenance plan:  7.5 mg (5 mg x 1.5) every Tue, Thu; 5 mg (5 mg x 1) all other days   Weekly warfarin total:  40 mg   Plan last modified:  Radha Eugene, PharmD (2025)   Next INR check:  2025   Target end date:  --    Indications    LVAD (left ventricular assist device) present (Resolved) [Z95.811]  Anticoagulation monitoring  INR range 2-3 (Resolved) [Z79.01]                 Anticoagulation Episode Summary       INR check location:  Clinic Lab    Preferred lab:  --    Send INR reminders to:  [facility] COUMADIN LVAD    Comments:  LVAD/ [facility] Lab/Lab [place] Results:[phone] Acct# [id number] Phone: [phone] [phone]/ Bridge:NO(h/o bleeds) see 3/12 encounter for meter process          Anticoagulation Care Providers       Provider Role Specialty Phone number    Antonio Hadley Jr., MD [facility] Cardiology [phone]

## 2025-07-14 ENCOUNTER — HOSPITAL ENCOUNTER (INPATIENT)
Facility: HOSPITAL | Age: 60
LOS: 2 days | Discharge: HOME | DRG: 308 | End: 2025-07-16
Attending: EMERGENCY MEDICINE | Admitting: INTERNAL MEDICINE
Payer: MEDICARE

## 2025-07-14 DIAGNOSIS — R00.2 PALPITATIONS: ICD-10-CM

## 2025-07-14 DIAGNOSIS — N17.9 ACUTE KIDNEY INJURY: ICD-10-CM

## 2025-07-14 DIAGNOSIS — I47.20 VT (VENTRICULAR TACHYCARDIA): ICD-10-CM

## 2025-07-14 DIAGNOSIS — I49.9 ARRHYTHMIA: ICD-10-CM

## 2025-07-14 DIAGNOSIS — R42 DIZZINESS: ICD-10-CM

## 2025-07-14 DIAGNOSIS — Z79.01 ANTICOAGULATION MONITORING, INR RANGE 2-3: ICD-10-CM

## 2025-07-14 DIAGNOSIS — I50.42 CHRONIC COMBINED SYSTOLIC AND DIASTOLIC HEART FAILURE: Chronic | ICD-10-CM

## 2025-07-14 DIAGNOSIS — I49.01 VENTRICULAR FIBRILLATION: Primary | ICD-10-CM

## 2025-07-14 DIAGNOSIS — Z95.811 LVAD (LEFT VENTRICULAR ASSIST DEVICE) PRESENT: Chronic | ICD-10-CM

## 2025-07-14 LAB
ABSOLUTE EOSINOPHIL (OHS): 0 K/UL
ABSOLUTE MONOCYTE (OHS): 1.39 K/UL (ref 0.3–1)
ABSOLUTE NEUTROPHIL COUNT (OHS): 9.78 K/UL (ref 1.8–7.7)
ALBUMIN SERPL BCP-MCNC: 4 G/DL (ref 3.5–5.2)
ALLENS TEST: NORMAL
ALP SERPL-CCNC: 93 UNIT/L (ref 40–150)
ALT SERPL W/O P-5'-P-CCNC: 258 UNIT/L (ref 10–44)
ANION GAP (OHS): 13 MMOL/L (ref 8–16)
AST SERPL-CCNC: 232 UNIT/L (ref 11–45)
BASOPHILS # BLD AUTO: 0.01 K/UL
BASOPHILS NFR BLD AUTO: 0.1 %
BILIRUB SERPL-MCNC: 0.5 MG/DL (ref 0.1–1)
BUN SERPL-MCNC: 61 MG/DL (ref 6–20)
CALCIUM SERPL-MCNC: 8.9 MG/DL (ref 8.7–10.5)
CHLORIDE SERPL-SCNC: 98 MMOL/L (ref 95–110)
CO2 SERPL-SCNC: 23 MMOL/L (ref 23–29)
CREAT SERPL-MCNC: 4.3 MG/DL (ref 0.5–1.4)
ERYTHROCYTE [DISTWIDTH] IN BLOOD BY AUTOMATED COUNT: 16.5 % (ref 11.5–14.5)
GFR SERPLBLD CREATININE-BSD FMLA CKD-EPI: 15 ML/MIN/1.73/M2
GLUCOSE SERPL-MCNC: 115 MG/DL (ref 70–110)
HCT VFR BLD AUTO: 44 % (ref 40–54)
HGB BLD-MCNC: 14.2 GM/DL (ref 14–18)
IMM GRANULOCYTES # BLD AUTO: 0.06 K/UL (ref 0–0.04)
IMM GRANULOCYTES NFR BLD AUTO: 0.5 % (ref 0–0.5)
INR PPP: 3.1 (ref 0.8–1.2)
LDH SERPL L TO P-CCNC: 1.89 MMOL/L (ref 0.5–2.2)
LYMPHOCYTES # BLD AUTO: 1.09 K/UL (ref 1–4.8)
MCH RBC QN AUTO: 28.6 PG (ref 27–31)
MCHC RBC AUTO-ENTMCNC: 32.3 G/DL (ref 32–36)
MCV RBC AUTO: 89 FL (ref 82–98)
NUCLEATED RBC (/100WBC) (OHS): 0 /100 WBC
PLATELET # BLD AUTO: 246 K/UL (ref 150–450)
PMV BLD AUTO: 10.9 FL (ref 9.2–12.9)
POTASSIUM SERPL-SCNC: 4.8 MMOL/L (ref 3.5–5.1)
PROT SERPL-MCNC: 8.2 GM/DL (ref 6–8.4)
PROTHROMBIN TIME: 31.2 SECONDS (ref 9–12.5)
RBC # BLD AUTO: 4.97 M/UL (ref 4.6–6.2)
RELATIVE EOSINOPHIL (OHS): 0 %
RELATIVE LYMPHOCYTE (OHS): 8.8 % (ref 18–48)
RELATIVE MONOCYTE (OHS): 11.3 % (ref 4–15)
RELATIVE NEUTROPHIL (OHS): 79.3 % (ref 38–73)
SAMPLE: NORMAL
SITE: NORMAL
SODIUM SERPL-SCNC: 134 MMOL/L (ref 136–145)
TROPONIN I SERPL HS-MCNC: 1172 NG/L
WBC # BLD AUTO: 12.33 K/UL (ref 3.9–12.7)

## 2025-07-14 PROCEDURE — 99291 CRITICAL CARE FIRST HOUR: CPT

## 2025-07-14 PROCEDURE — 25000003 PHARM REV CODE 250

## 2025-07-14 PROCEDURE — 96375 TX/PRO/DX INJ NEW DRUG ADDON: CPT

## 2025-07-14 PROCEDURE — 85610 PROTHROMBIN TIME: CPT | Performed by: EMERGENCY MEDICINE

## 2025-07-14 PROCEDURE — 27000248 HC VAD-ADDITIONAL DAY

## 2025-07-14 PROCEDURE — 83605 ASSAY OF LACTIC ACID: CPT

## 2025-07-14 PROCEDURE — 85025 COMPLETE CBC W/AUTO DIFF WBC: CPT | Performed by: EMERGENCY MEDICINE

## 2025-07-14 PROCEDURE — 99285 EMERGENCY DEPT VISIT HI MDM: CPT | Mod: 25

## 2025-07-14 PROCEDURE — 63600175 PHARM REV CODE 636 W HCPCS: Performed by: STUDENT IN AN ORGANIZED HEALTH CARE EDUCATION/TRAINING PROGRAM

## 2025-07-14 PROCEDURE — 96374 THER/PROPH/DIAG INJ IV PUSH: CPT

## 2025-07-14 PROCEDURE — 80053 COMPREHEN METABOLIC PANEL: CPT | Performed by: EMERGENCY MEDICINE

## 2025-07-14 PROCEDURE — 20000000 HC ICU ROOM

## 2025-07-14 PROCEDURE — 99900035 HC TECH TIME PER 15 MIN (STAT)

## 2025-07-14 PROCEDURE — 99223 1ST HOSP IP/OBS HIGH 75: CPT | Mod: ,,, | Performed by: INTERNAL MEDICINE

## 2025-07-14 PROCEDURE — 25000003 PHARM REV CODE 250: Performed by: EMERGENCY MEDICINE

## 2025-07-14 PROCEDURE — 82803 BLOOD GASES ANY COMBINATION: CPT

## 2025-07-14 PROCEDURE — 84484 ASSAY OF TROPONIN QUANT: CPT | Performed by: EMERGENCY MEDICINE

## 2025-07-14 PROCEDURE — 93750 INTERROGATION VAD IN PERSON: CPT | Mod: ,,, | Performed by: INTERNAL MEDICINE

## 2025-07-14 RX ORDER — SIMETHICONE 80 MG
1 TABLET,CHEWABLE ORAL 3 TIMES DAILY PRN
Status: DISCONTINUED | OUTPATIENT
Start: 2025-07-14 | End: 2025-07-16 | Stop reason: HOSPADM

## 2025-07-14 RX ORDER — FENTANYL CITRATE 50 UG/ML
50 INJECTION, SOLUTION INTRAMUSCULAR; INTRAVENOUS
Refills: 0 | Status: COMPLETED | OUTPATIENT
Start: 2025-07-14 | End: 2025-07-14

## 2025-07-14 RX ORDER — PANTOPRAZOLE SODIUM 40 MG/1
40 TABLET, DELAYED RELEASE ORAL
Status: DISCONTINUED | OUTPATIENT
Start: 2025-07-15 | End: 2025-07-16 | Stop reason: HOSPADM

## 2025-07-14 RX ORDER — BUSPIRONE HYDROCHLORIDE 5 MG/1
5 TABLET ORAL 2 TIMES DAILY
Status: DISCONTINUED | OUTPATIENT
Start: 2025-07-15 | End: 2025-07-16 | Stop reason: HOSPADM

## 2025-07-14 RX ORDER — MIDAZOLAM HYDROCHLORIDE 1 MG/ML
2 INJECTION, SOLUTION INTRAMUSCULAR; INTRAVENOUS
Status: COMPLETED | OUTPATIENT
Start: 2025-07-14 | End: 2025-07-14

## 2025-07-14 RX ORDER — ACETAMINOPHEN 325 MG/1
650 TABLET ORAL EVERY 6 HOURS PRN
Status: DISCONTINUED | OUTPATIENT
Start: 2025-07-14 | End: 2025-07-16 | Stop reason: HOSPADM

## 2025-07-14 RX ORDER — TALC
6 POWDER (GRAM) TOPICAL NIGHTLY PRN
Status: DISCONTINUED | OUTPATIENT
Start: 2025-07-14 | End: 2025-07-16 | Stop reason: HOSPADM

## 2025-07-14 RX ORDER — CEFADROXIL 500 MG/1
500 CAPSULE ORAL EVERY 12 HOURS
Status: DISCONTINUED | OUTPATIENT
Start: 2025-07-15 | End: 2025-07-16 | Stop reason: HOSPADM

## 2025-07-14 RX ORDER — LIDOCAINE HYDROCHLORIDE 10 MG/ML
INJECTION, SOLUTION INFILTRATION; PERINEURAL
Status: COMPLETED
Start: 2025-07-14 | End: 2025-07-15

## 2025-07-14 RX ORDER — LANOLIN ALCOHOL/MO/W.PET/CERES
400 CREAM (GRAM) TOPICAL DAILY
Status: DISCONTINUED | OUTPATIENT
Start: 2025-07-15 | End: 2025-07-16 | Stop reason: HOSPADM

## 2025-07-14 RX ORDER — ALPRAZOLAM 0.5 MG/1
1 TABLET ORAL 2 TIMES DAILY PRN
Status: DISCONTINUED | OUTPATIENT
Start: 2025-07-14 | End: 2025-07-16 | Stop reason: HOSPADM

## 2025-07-14 RX ORDER — AMLODIPINE BESYLATE 10 MG/1
10 TABLET ORAL DAILY
Status: DISCONTINUED | OUTPATIENT
Start: 2025-07-15 | End: 2025-07-16 | Stop reason: HOSPADM

## 2025-07-14 RX ORDER — ACETAMINOPHEN 325 MG/1
650 TABLET ORAL
Status: COMPLETED | OUTPATIENT
Start: 2025-07-14 | End: 2025-07-14

## 2025-07-14 RX ORDER — POLYETHYLENE GLYCOL 3350 17 G/17G
17 POWDER, FOR SOLUTION ORAL 2 TIMES DAILY PRN
Status: DISCONTINUED | OUTPATIENT
Start: 2025-07-14 | End: 2025-07-16 | Stop reason: HOSPADM

## 2025-07-14 RX ORDER — MIRTAZAPINE 15 MG/1
45 TABLET, FILM COATED ORAL NIGHTLY
Status: DISCONTINUED | OUTPATIENT
Start: 2025-07-15 | End: 2025-07-16 | Stop reason: HOSPADM

## 2025-07-14 RX ORDER — FUROSEMIDE 10 MG/ML
80 INJECTION INTRAMUSCULAR; INTRAVENOUS ONCE
Status: COMPLETED | OUTPATIENT
Start: 2025-07-14 | End: 2025-07-14

## 2025-07-14 RX ORDER — SENNOSIDES 8.6 MG/1
8.6 TABLET ORAL DAILY PRN
Status: DISCONTINUED | OUTPATIENT
Start: 2025-07-14 | End: 2025-07-16 | Stop reason: HOSPADM

## 2025-07-14 RX ORDER — WARFARIN SODIUM 5 MG/1
5 TABLET ORAL DAILY
Status: DISCONTINUED | OUTPATIENT
Start: 2025-07-15 | End: 2025-07-15

## 2025-07-14 RX ORDER — MEXILETINE HYDROCHLORIDE 250 MG/1
250 CAPSULE ORAL EVERY 8 HOURS
Status: DISCONTINUED | OUTPATIENT
Start: 2025-07-15 | End: 2025-07-16 | Stop reason: HOSPADM

## 2025-07-14 RX ADMIN — FUROSEMIDE 80 MG: 10 INJECTION, SOLUTION INTRAVENOUS at 11:07

## 2025-07-14 RX ADMIN — MIDAZOLAM 2 MG: 1 INJECTION INTRAMUSCULAR; INTRAVENOUS at 10:07

## 2025-07-14 RX ADMIN — FUROSEMIDE 10 MG/HR: 10 INJECTION INTRAMUSCULAR; INTRAVENOUS at 11:07

## 2025-07-14 RX ADMIN — AMIODARONE HYDROCHLORIDE 150 MG: 1.5 INJECTION, SOLUTION INTRAVENOUS at 11:07

## 2025-07-14 RX ADMIN — AMIODARONE HYDROCHLORIDE 1 MG/MIN: 1.8 INJECTION, SOLUTION INTRAVENOUS at 11:07

## 2025-07-14 RX ADMIN — ACETAMINOPHEN 650 MG: 325 TABLET ORAL at 10:07

## 2025-07-14 RX ADMIN — ALPRAZOLAM 1 MG: 0.5 TABLET ORAL at 11:07

## 2025-07-14 RX ADMIN — FENTANYL CITRATE 50 MCG: 50 INJECTION INTRAMUSCULAR; INTRAVENOUS at 10:07

## 2025-07-15 PROBLEM — N18.9 ACUTE KIDNEY INJURY SUPERIMPOSED ON CKD: Status: ACTIVE | Noted: 2022-07-15

## 2025-07-15 LAB
ABSOLUTE EOSINOPHIL (OHS): 0.01 K/UL
ABSOLUTE MONOCYTE (OHS): 1.06 K/UL (ref 0.3–1)
ABSOLUTE NEUTROPHIL COUNT (OHS): 7.64 K/UL (ref 1.8–7.7)
ALBUMIN SERPL BCP-MCNC: 3.8 G/DL (ref 3.5–5.2)
ALBUMIN SERPL BCP-MCNC: 4 G/DL (ref 3.5–5.2)
ALBUMIN SERPL BCP-MCNC: 4 G/DL (ref 3.5–5.2)
ALLENS TEST: ABNORMAL
ALP SERPL-CCNC: 93 UNIT/L (ref 40–150)
ALT SERPL W/O P-5'-P-CCNC: 244 UNIT/L (ref 10–44)
ANION GAP (OHS): 10 MMOL/L (ref 8–16)
ANION GAP (OHS): 11 MMOL/L (ref 8–16)
AST SERPL-CCNC: 210 UNIT/L (ref 11–45)
BASOPHILS # BLD AUTO: 0.03 K/UL
BASOPHILS NFR BLD AUTO: 0.3 %
BILIRUB DIRECT SERPL-MCNC: 0.2 MG/DL (ref 0.1–0.3)
BILIRUB SERPL-MCNC: 0.5 MG/DL (ref 0.1–1)
BNP SERPL-MCNC: 604 PG/ML (ref 0–99)
BUN SERPL-MCNC: 49 MG/DL (ref 6–20)
BUN SERPL-MCNC: 55 MG/DL (ref 6–20)
CALCIUM SERPL-MCNC: 8.8 MG/DL (ref 8.7–10.5)
CALCIUM SERPL-MCNC: 9 MG/DL (ref 8.7–10.5)
CHLORIDE SERPL-SCNC: 97 MMOL/L (ref 95–110)
CHLORIDE SERPL-SCNC: 99 MMOL/L (ref 95–110)
CO2 SERPL-SCNC: 28 MMOL/L (ref 23–29)
CO2 SERPL-SCNC: 28 MMOL/L (ref 23–29)
CREAT SERPL-MCNC: 3.3 MG/DL (ref 0.5–1.4)
CREAT SERPL-MCNC: 3.7 MG/DL (ref 0.5–1.4)
DELSYS: ABNORMAL
ERYTHROCYTE [DISTWIDTH] IN BLOOD BY AUTOMATED COUNT: 15.9 % (ref 11.5–14.5)
ERYTHROCYTE [SEDIMENTATION RATE] IN BLOOD BY WESTERGREN METHOD: 18 MM/H
ERYTHROCYTE [SEDIMENTATION RATE] IN BLOOD BY WESTERGREN METHOD: 20 MM/H
ETHANOL SERPL-MCNC: <10 MG/DL
FIO2: 21
FIO2: 21
GFR SERPLBLD CREATININE-BSD FMLA CKD-EPI: 18 ML/MIN/1.73/M2
GFR SERPLBLD CREATININE-BSD FMLA CKD-EPI: 21 ML/MIN/1.73/M2
GLUCOSE SERPL-MCNC: 103 MG/DL (ref 70–110)
GLUCOSE SERPL-MCNC: 112 MG/DL (ref 70–110)
HCO3 UR-SCNC: 31.9 MMOL/L (ref 24–28)
HCO3 UR-SCNC: 32.2 MMOL/L (ref 24–28)
HCT VFR BLD AUTO: 42.6 % (ref 40–54)
HCT VFR BLD CALC: 43 %PCV (ref 36–54)
HGB BLD-MCNC: 13.2 GM/DL (ref 14–18)
IMM GRANULOCYTES # BLD AUTO: 0.05 K/UL (ref 0–0.04)
IMM GRANULOCYTES NFR BLD AUTO: 0.5 % (ref 0–0.5)
INR PPP: 3 (ref 0.8–1.2)
INR PPP: 3.1 (ref 0.8–1.2)
LDH SERPL-CCNC: 309 U/L (ref 110–260)
LYMPHOCYTES # BLD AUTO: 1.16 K/UL (ref 1–4.8)
MAGNESIUM SERPL-MCNC: 2.9 MG/DL (ref 1.6–2.6)
MAGNESIUM SERPL-MCNC: 3 MG/DL (ref 1.6–2.6)
MCH RBC QN AUTO: 27.9 PG (ref 27–31)
MCHC RBC AUTO-ENTMCNC: 31 G/DL (ref 32–36)
MCV RBC AUTO: 90 FL (ref 82–98)
MODE: ABNORMAL
NUCLEATED RBC (/100WBC) (OHS): 0 /100 WBC
OHS QRS DURATION: 180 MS
OHS QTC CALCULATION: 659 MS
PCO2 BLDA: 47.7 MMHG (ref 35–45)
PCO2 BLDA: 50.2 MMHG (ref 35–45)
PH SMN: 7.41 [PH] (ref 7.35–7.45)
PH SMN: 7.44 [PH] (ref 7.35–7.45)
PHOSPHATE SERPL-MCNC: 2.6 MG/DL (ref 2.7–4.5)
PHOSPHATE SERPL-MCNC: 3.1 MG/DL (ref 2.7–4.5)
PHOSPHATE SERPL-MCNC: 3.3 MG/DL (ref 2.7–4.5)
PLATELET # BLD AUTO: 212 K/UL (ref 150–450)
PMV BLD AUTO: 10.2 FL (ref 9.2–12.9)
PO2 BLDA: 33 MMHG (ref 40–60)
PO2 BLDA: 35 MMHG (ref 40–60)
PO2 BLDA: 36 MMHG (ref 40–60)
PO2 BLDA: 74 MMHG (ref 80–100)
POC BE: 7 MMOL/L (ref -2–2)
POC BE: 8 MMOL/L (ref -2–2)
POC IONIZED CALCIUM: 1.12 MMOL/L (ref 1.06–1.42)
POC SATURATED O2: 63 % (ref 95–100)
POC SATURATED O2: 66 % (ref 95–100)
POC SATURATED O2: 70 % (ref 95–100)
POC SATURATED O2: 95 % (ref 95–100)
POC TCO2: 33 MMOL/L (ref 24–29)
POC TCO2: 34 MMOL/L (ref 23–27)
POTASSIUM BLD-SCNC: 3.4 MMOL/L (ref 3.5–5.1)
POTASSIUM SERPL-SCNC: 3.5 MMOL/L (ref 3.5–5.1)
POTASSIUM SERPL-SCNC: 4.1 MMOL/L (ref 3.5–5.1)
PROT SERPL-MCNC: 8.3 GM/DL (ref 6–8.4)
PROTHROMBIN TIME: 30.3 SECONDS (ref 9–12.5)
PROTHROMBIN TIME: 31.2 SECONDS (ref 9–12.5)
RBC # BLD AUTO: 4.73 M/UL (ref 4.6–6.2)
RELATIVE EOSINOPHIL (OHS): 0.1 %
RELATIVE LYMPHOCYTE (OHS): 11.7 % (ref 18–48)
RELATIVE MONOCYTE (OHS): 10.7 % (ref 4–15)
RELATIVE NEUTROPHIL (OHS): 76.7 % (ref 38–73)
SAMPLE: ABNORMAL
SITE: ABNORMAL
SODIUM BLD-SCNC: 138 MMOL/L (ref 136–145)
SODIUM SERPL-SCNC: 136 MMOL/L (ref 136–145)
SODIUM SERPL-SCNC: 137 MMOL/L (ref 136–145)
TROPONIN I SERPL HS-MCNC: 3245 NG/L
WBC # BLD AUTO: 9.95 K/UL (ref 3.9–12.7)

## 2025-07-15 PROCEDURE — 36556 INSERT NON-TUNNEL CV CATH: CPT

## 2025-07-15 PROCEDURE — 93750 INTERROGATION VAD IN PERSON: CPT | Mod: ,,, | Performed by: INTERNAL MEDICINE

## 2025-07-15 PROCEDURE — 25000003 PHARM REV CODE 250

## 2025-07-15 PROCEDURE — 82803 BLOOD GASES ANY COMBINATION: CPT

## 2025-07-15 PROCEDURE — 87040 BLOOD CULTURE FOR BACTERIA: CPT

## 2025-07-15 PROCEDURE — 84100 ASSAY OF PHOSPHORUS: CPT | Performed by: STUDENT IN AN ORGANIZED HEALTH CARE EDUCATION/TRAINING PROGRAM

## 2025-07-15 PROCEDURE — 82800 BLOOD PH: CPT

## 2025-07-15 PROCEDURE — 63600175 PHARM REV CODE 636 W HCPCS: Performed by: INTERNAL MEDICINE

## 2025-07-15 PROCEDURE — 02HV33Z INSERTION OF INFUSION DEVICE INTO SUPERIOR VENA CAVA, PERCUTANEOUS APPROACH: ICD-10-PCS | Performed by: INTERNAL MEDICINE

## 2025-07-15 PROCEDURE — 36620 INSERTION CATHETER ARTERY: CPT

## 2025-07-15 PROCEDURE — 83880 ASSAY OF NATRIURETIC PEPTIDE: CPT

## 2025-07-15 PROCEDURE — 84100 ASSAY OF PHOSPHORUS: CPT

## 2025-07-15 PROCEDURE — 83615 LACTATE (LD) (LDH) ENZYME: CPT | Performed by: STUDENT IN AN ORGANIZED HEALTH CARE EDUCATION/TRAINING PROGRAM

## 2025-07-15 PROCEDURE — 85025 COMPLETE CBC W/AUTO DIFF WBC: CPT | Performed by: STUDENT IN AN ORGANIZED HEALTH CARE EDUCATION/TRAINING PROGRAM

## 2025-07-15 PROCEDURE — 83735 ASSAY OF MAGNESIUM: CPT

## 2025-07-15 PROCEDURE — 63600175 PHARM REV CODE 636 W HCPCS: Performed by: STUDENT IN AN ORGANIZED HEALTH CARE EDUCATION/TRAINING PROGRAM

## 2025-07-15 PROCEDURE — 84484 ASSAY OF TROPONIN QUANT: CPT

## 2025-07-15 PROCEDURE — 25000003 PHARM REV CODE 250: Performed by: INTERNAL MEDICINE

## 2025-07-15 PROCEDURE — 94761 N-INVAS EAR/PLS OXIMETRY MLT: CPT | Mod: XB

## 2025-07-15 PROCEDURE — 63600175 PHARM REV CODE 636 W HCPCS

## 2025-07-15 PROCEDURE — 20000000 HC ICU ROOM

## 2025-07-15 PROCEDURE — 85610 PROTHROMBIN TIME: CPT | Performed by: STUDENT IN AN ORGANIZED HEALTH CARE EDUCATION/TRAINING PROGRAM

## 2025-07-15 PROCEDURE — 99900035 HC TECH TIME PER 15 MIN (STAT)

## 2025-07-15 PROCEDURE — 82077 ASSAY SPEC XCP UR&BREATH IA: CPT | Performed by: STUDENT IN AN ORGANIZED HEALTH CARE EDUCATION/TRAINING PROGRAM

## 2025-07-15 PROCEDURE — 82248 BILIRUBIN DIRECT: CPT

## 2025-07-15 PROCEDURE — 99233 SBSQ HOSP IP/OBS HIGH 50: CPT | Mod: ,,, | Performed by: INTERNAL MEDICINE

## 2025-07-15 PROCEDURE — C1751 CATH, INF, PER/CENT/MIDLINE: HCPCS

## 2025-07-15 PROCEDURE — 99499 UNLISTED E&M SERVICE: CPT | Mod: ,,, | Performed by: INTERNAL MEDICINE

## 2025-07-15 PROCEDURE — 83735 ASSAY OF MAGNESIUM: CPT | Performed by: STUDENT IN AN ORGANIZED HEALTH CARE EDUCATION/TRAINING PROGRAM

## 2025-07-15 PROCEDURE — 37799 UNLISTED PX VASCULAR SURGERY: CPT

## 2025-07-15 PROCEDURE — 27000248 HC VAD-ADDITIONAL DAY

## 2025-07-15 RX ORDER — POTASSIUM CHLORIDE 750 MG/1
30 CAPSULE, EXTENDED RELEASE ORAL ONCE
Status: COMPLETED | OUTPATIENT
Start: 2025-07-15 | End: 2025-07-15

## 2025-07-15 RX ORDER — WARFARIN 2.5 MG/1
2.5 TABLET ORAL
Status: DISCONTINUED | OUTPATIENT
Start: 2025-07-16 | End: 2025-07-16

## 2025-07-15 RX ORDER — LORAZEPAM 2 MG/ML
1 INJECTION INTRAMUSCULAR ONCE
Status: COMPLETED | OUTPATIENT
Start: 2025-07-15 | End: 2025-07-15

## 2025-07-15 RX ORDER — POTASSIUM CHLORIDE 20 MEQ/1
40 TABLET, EXTENDED RELEASE ORAL ONCE
Status: COMPLETED | OUTPATIENT
Start: 2025-07-15 | End: 2025-07-15

## 2025-07-15 RX ORDER — AMIODARONE HYDROCHLORIDE 200 MG/1
400 TABLET ORAL DAILY
Status: DISCONTINUED | OUTPATIENT
Start: 2025-07-16 | End: 2025-07-16 | Stop reason: HOSPADM

## 2025-07-15 RX ORDER — WARFARIN SODIUM 5 MG/1
5 TABLET ORAL
Status: DISCONTINUED | OUTPATIENT
Start: 2025-07-15 | End: 2025-07-16

## 2025-07-15 RX ADMIN — AMLODIPINE BESYLATE 10 MG: 10 TABLET ORAL at 09:07

## 2025-07-15 RX ADMIN — LIDOCAINE HYDROCHLORIDE 200 MG: 10 INJECTION, SOLUTION INFILTRATION; PERINEURAL at 12:07

## 2025-07-15 RX ADMIN — WARFARIN SODIUM 5 MG: 5 TABLET ORAL at 04:07

## 2025-07-15 RX ADMIN — Medication 400 MG: at 09:07

## 2025-07-15 RX ADMIN — MEXILETINE HYDROCHLORIDE 250 MG: 250 CAPSULE ORAL at 06:07

## 2025-07-15 RX ADMIN — ALPRAZOLAM 1 MG: 0.5 TABLET ORAL at 08:07

## 2025-07-15 RX ADMIN — AMIODARONE HYDROCHLORIDE 0.5 MG/MIN: 1.8 INJECTION, SOLUTION INTRAVENOUS at 04:07

## 2025-07-15 RX ADMIN — MEXILETINE HYDROCHLORIDE 250 MG: 250 CAPSULE ORAL at 02:07

## 2025-07-15 RX ADMIN — ACETAMINOPHEN 650 MG: 325 TABLET ORAL at 06:07

## 2025-07-15 RX ADMIN — CEFADROXIL 500 MG: 500 CAPSULE ORAL at 08:07

## 2025-07-15 RX ADMIN — MIRTAZAPINE 45 MG: 15 TABLET, FILM COATED ORAL at 08:07

## 2025-07-15 RX ADMIN — LORAZEPAM 1 MG: 2 INJECTION INTRAMUSCULAR at 02:07

## 2025-07-15 RX ADMIN — POTASSIUM CHLORIDE 40 MEQ: 1500 TABLET, EXTENDED RELEASE ORAL at 09:07

## 2025-07-15 RX ADMIN — PANTOPRAZOLE SODIUM 40 MG: 40 TABLET, DELAYED RELEASE ORAL at 01:07

## 2025-07-15 RX ADMIN — CEFADROXIL 500 MG: 500 CAPSULE ORAL at 09:07

## 2025-07-15 RX ADMIN — BUSPIRONE HYDROCHLORIDE 5 MG: 5 TABLET ORAL at 09:07

## 2025-07-15 RX ADMIN — POTASSIUM CHLORIDE 30 MEQ: 750 CAPSULE, EXTENDED RELEASE ORAL at 06:07

## 2025-07-15 RX ADMIN — MEXILETINE HYDROCHLORIDE 250 MG: 250 CAPSULE ORAL at 09:07

## 2025-07-15 RX ADMIN — BUSPIRONE HYDROCHLORIDE 5 MG: 5 TABLET ORAL at 08:07

## 2025-07-15 RX ADMIN — LEVOTHYROXINE SODIUM 125 MCG: 0.03 TABLET ORAL at 06:07

## 2025-07-15 NOTE — PT/OT/SLP PROGRESS
Physical Therapy      Patient Name:  Tim Richards   MRN:  [id number]    Patient not seen today secondary to: declined OT eval attempts. Will follow-up when medically appropriate for evaluation.      7/15/2025

## 2025-07-15 NOTE — CONSULTS
[facility] - Cardiac Intensive Care  Adult Nutrition  Consult Note    SUMMARY     Recommendations    Interventions: Fat modified diet, Cholesterol modified diet, Sodium modified diet, Content related nutrition education, Collaboration and referral of nutrition care    Recommendations:   1. Continue cardiac diet as tolerated     - please continue to document PO % intake via flowsheets       2. Encourage good intake    3. RD to monitor weight, labs, meds, intake, tolerance    Goal #1: PO intake will meet greater than or equal to 75% of estimated needs by RD follow up  Nutrition Goal Status #1: new  Goal #2: Maintain weight throughout hospitalization  Nutrition Goal Status #2: new  Communication of RD Recs:  (POC)    Nutrition Discharge Planning     Nutrition Discharge Planning: Therapeutic diet (comments)  Therapeutic diet (comments): cardiac diet    Reason for Assessment    Reason For Assessment: consult (heart failure)  Diagnosis: cardiac disease (ventricular fibrillation)  General Information Comments: Pt familiar to RD team. Pt admitted with ventricular fibrillation. PMHx: NICM, stage D HFrEF s/p HM2 (implanted 3/8/2018 as DT-VAD) but required pump exchange to HM3 (7/13/2022) 2/2 pump thrombosis, VT s/p S-ICD, AFL, hyperthyroidism, hypothyroidism, dilated cardiomyopathy, disorder of kidney and ureter. No recent surgical hx. Pt presented with lightheadedness, found to be in VF. No edema noted per flowsheets - leg swelling per H&P. No wounds noted. Pt stated he was tired and asked if I could come back another time. No GI s/s - BM: 7/14. Having a good intake - PO: %. Wt fluctuations throughout the year due to fluid, overall stable. No concerns for malnutrition at this time. Heart healthy education provided on 4/21, will go over education again at follow up.    Nutrition/Diet History    Spiritual, Cultural Beliefs, Taoism Practices, Values that Affect Care: no  Food Allergies: NKFA  Factors Affecting  "Nutritional Intake: None identified at this time  Nutrition-related SDOH: None Identified    Anthropometrics    Height: 6' 1" (185.4 cm)  Height (inches): 73 in  Height Method: Stated  Weight: 107.5 kg (236 lb 15.9 oz)  Weight (lb): 237 lb  Weight Method: Bed Scale  Ideal Body Weight (IBW), Male: 184 lb  % Ideal Body Weight, Male (lb): 128.8 %  BMI (Calculated): 31.3  BMI Grade: 30 - 34.9- obesity - grade I    Lab/Procedures/Meds    Pertinent Labs Reviewed: reviewed  Pertinent Labs Comments: hemoglobin 13.2 low, BUN 55 high, creatinine 3.7 high, GFR 18 low, Mg 2.9 high,  high,  high  Pertinent Medications Reviewed: reviewed  Pertinent Medications Comments: amiodarone, amlodipine, cefadroxil, levothyroxine, magnesium oxide, mexiletine, mirtazapine, pantoprazole    Estimated/Assessed Needs    Weight Used For Calorie Calculations: 107.5 kg (236 lb 15.9 oz)  Energy Calorie Requirements (kcal): 1949 kcal  Energy Need Method: [place]-St Jeor (no AF (BMI > 30))  Protein Requirements: 167 g/pro (2.0 g/kg)  Weight Used For Protein Calculations: 83.5 kg (184 lb) (IBW)        RDA Method (mL): 1949  CHO Requirement: 244 g    Nutrition Prescription Ordered    Current Diet Order: cardiac    Evaluation of Received Nutrient/Fluid Intake    Energy Calories Required: meeting needs  Protein Required: meeting needs  Fluid Required:  (per MD)  Tolerance: tolerating  % Intake of Estimated Energy Needs: 75 - 100 %  % Meal Intake: 75 - 100 %    PES Statement    No nutrition diagnosis at this time   Status: New    Nutrition Risk    Level of Risk/Frequency of Follow-up: low (1/week)     Monitor and Evaluation    Monitor and Evaluation: Energy intake, Food and beverage intake, Protein intake, Carbohydrate intake, Diet order, Food and nutrition knowledge, Weight, Beliefs and attitudes, Electrolyte and renal panel, Gastrointestinal profile, Glucose/endocrine profile, Inflammatory profile, Lipid profile, Nutrition focused physical " findings, Skin     Nutrition Follow-Up    RD Follow-up?: Yes

## 2025-07-15 NOTE — HPI
Tim Richards is a 59 y.o. M w a history of NICM, stage D HFrEF s/p HM2 (implanted 3/8/2018 as DT-VAD) but required pump exchange to HM3 (7/13/2022) 2/2 pump thrombosis, VT (on Amiodarone and Mexiletine) s/p S-ICD ([place] [employer]), AFL, hyperthyroidism (Amiodarone induced), and hypothyroidism who presented with lightheadedness, found to be in VF. EP consulted for management of VF.      Patient reports onset of lightheadedness and palpitations starting 4 days ago. States he had a LFA 2 days ago when trying to stand up. He also notes increased dyspnea, edema of thighs, and abdominal distension. He reports reduced UOP on home Torsemide PO. He denies syncope, fever/chills, chest pain, orthopnea, n/v. Denies increased drainage from DL. He reports compliance with Amiodarone and Mexiletine; no longer prescribed a BB.      Patient recently seen in clinic by Dr. Fay on 06/04/25 for history of VT, at which time he was feeling well. He had been admitted in 04/2025 for VT storm while off Mexiletine due to cost/insurance coverage and in the s/o recent etoh use, resulting in multiple device shocks. He briefly required IV amiodarone and IV lidocaine before being switched to PO amiodarone and PO mexiletine prior to discharge. He elected to deactivate tachy therapies prior to discharge as he became extraordinarily anxious with shocks. In-clinic ICD check 06/04/25, no VT since admission.     In the ED, patient in ventricular fibrillation on telemetry and confirmed with bedside interrogation of his device. Interrogation noted no recorded episodes since events in April. S/p DCCV x1 with return to sinus rhythm. Hemodynamically and clinically stable through event. Labs notable for AST//258 respectively, Cr 4.3 (baseline Cr 2), K 4.8, POC lactate 1.8, INR 3.1.

## 2025-07-15 NOTE — HOSPITAL COURSE
Patient has a history of nonischemic cardiomyopathy, stage D heart failure reduced EF status post hm 2 in 2018 as destination therapy, history of pump thrombosis, VT on anticoagulations, presented to the hospital with lightheadedness and palpitation along with low-flow alarms, found to be in VF.  Initially was started on Lasix drip and amiodarone drip, CVP remains low, Lasix/diuretics were stopped, EP was consulted, completed amiodarone for 24 hours then placed on p.o. amiodarone 400 mg daily along with mexiletine, patient does not want his device switched on for shocking, nothing to offer from EP, patent back to baseline ready to be discharged, EP the okay to start inpatient as well. No changes in his home meds.

## 2025-07-15 NOTE — SUBJECTIVE & OBJECTIVE
Interval History:   Patient seen and examined this morning, no acute overnight events, feels okay, no further low-flow alarms, stable on amiodarone, hemodynamics with CVP of plan, SVo2  63, C0-C0 60 and 2.8 respectively.  Was initially started on Lasix drip which was later on stopped,    Review of Systems   Constitutional: Negative for chills, decreased appetite and diaphoresis.   HENT:  Negative for congestion and ear discharge.    Eyes:  Negative for blurred vision, discharge and pain.   Cardiovascular:  Negative for chest pain, claudication, dyspnea on exertion and irregular heartbeat.   Respiratory:  Negative for cough, hemoptysis and shortness of breath.    Gastrointestinal:  Negative for bloating, abdominal pain, flatus, heartburn, nausea and vomiting.   Genitourinary:  Negative for bladder incontinence, hematuria and hesitancy.   Neurological:  Positive for dizziness and light-headedness. Negative for aphonia, brief paralysis, focal weakness and weakness.     Objective:     Vital Signs (Most Recent):  Temp: 98.5 °F (36.9 °C) (07/15/25 0800)  Pulse: 60 (07/15/25 1000)  Resp: (!) 25 (07/15/25 1000)  BP: (!) 88/0 (07/15/25 0800)  SpO2: 95 % (07/15/25 1000) Vital Signs (24h Range):  Temp:  [98.5 °F (36.9 °C)-98.6 °F (37 °C)] 98.5 °F (36.9 °C)  Pulse:  [0-127] 60  Resp:  [15-25] 25  SpO2:  [82 %-98 %] 95 %  BP: (88-93)/(0) 88/0  Arterial Line BP: ()/(23-86) 94/79     Weight: (S) 107.5 kg (236 lb 15.9 oz)  Body mass index is 31.27 kg/m².     SpO2: 95 %         Intake/Output Summary (Last 24 hours) at 7/15/2025 1036  Last data filed at 7/15/2025 1000  Gross per 24 hour   Intake 607.54 ml   Output 2100 ml   Net -1492.46 ml       Lines/Drains/Airways       Central Venous Catheter Line  Duration             Percutaneous Central Line Triple Lumen 07/15/25 0032 Internal Jugular Right <1 day              Arterial Line  Duration             Arterial Line 07/15/25 0010 Left Radial <1 day              Peripheral  Intravenous Line  Duration             Peripheral IV Single Lumen 07/14/25 2100 20 G Anterior;Right Forearm <1 day    Peripheral IV Single Lumen 07/14/25 2258 20 G Anterior;Distal;Left Forearm <1 day                       Physical Exam  Constitutional:       General: He is not in acute distress.     Appearance: Normal appearance. He is not ill-appearing.   Cardiovascular:      Pulses: Normal pulses.      Heart sounds: No murmur heard.     Comments: LVAD hum  Pulmonary:      Effort: Pulmonary effort is normal. No respiratory distress.   Abdominal:      General: Abdomen is flat. Bowel sounds are normal. There is no distension.   Musculoskeletal:         General: No swelling or tenderness.      Cervical back: No rigidity.   Skin:     Capillary Refill: Capillary refill takes less than 2 seconds.   Neurological:      General: No focal deficit present.      Mental Status: He is alert.            Significant Labs: BMP:   Recent Labs   Lab 07/14/25  2100 07/15/25  0008 07/15/25  0322   *  --  103   *  --  136   K 4.8  --  3.5   CL 98  --  97   CO2 23  --  28   BUN 61*  --  55*   CREATININE 4.3*  --  3.7*   CALCIUM 8.9  --  9.0   MG  --  3.0* 2.9*    and CMP   Recent Labs   Lab 07/14/25  2100 07/15/25  0322   * 136   K 4.8 3.5   CL 98 97   CO2 23 28   * 103   BUN 61* 55*   CREATININE 4.3* 3.7*   CALCIUM 8.9 9.0   PROT 8.2 8.3   ALBUMIN 4.0 4.0  4.0   BILITOT 0.5 0.5   ALKPHOS 93 93   * 210*   * 244*   ANIONGAP 13 11

## 2025-07-15 NOTE — PLAN OF CARE
"Cardiac ICU Care Plan      POC reviewed with Tim Richards and family. Questions and concerns addressed. See below and flowsheets for full assessment and VS info.       Neuro:  [place] Coma Scale  Best Eye Response: 4-->(E4) spontaneous  Best Motor Response: 6-->(M6) obeys commands  Best Verbal Response: 5-->(V5) oriented  [place] Coma Scale Score: 15  Pupil PERRLA: yes    24 hr Temp:  [98.5 °F (36.9 °C)-98.6 °F (37 °C)]      CV:  Rhythm: other (see comments) (Patient on LVAD interference present)   DVT prophylaxis:      CVP (mean): (S) 2 mmHg (07/15/25 0105)       SVO2 (%): (S) 63 % (07/15/25 0105)       Type: HeartMate3       Pulses  Right Radial Pulse: Doppler  Left Radial Pulse: Doppler  Right Dorsalis Pedis Pulse: Doppler  Left Dorsalis Pedis Pulse: Doppler  Right Posterior Tibial Pulse: Doppler  Left Posterior Tibial Pulse: Doppler    Resp:          GI/:  GI prophylaxis: yes     Last Bowel Movement: 07/14/25      Intake/Output Summary (Last 24 hours) at 7/15/2025 0738  Last data filed at 7/15/2025 0605  Gross per 24 hour   Intake 542.28 ml   Output 1400 ml   Net -857.72 ml          Labs/Accuchecks:  Recent Labs   Lab 07/14/25  2100 07/15/25  0322   WBC 12.33 9.95   RBC 4.97 4.73   HGB 14.2 13.2*   HCT 44.0 42.6    212      Recent Labs   Lab 07/14/25  2100 07/15/25  0008 07/15/25  0322   INR 3.1* 3.0* 3.1*      Recent Labs     07/15/25  0322      K 3.5   CO2 28   CL 97   BUN 55*   CREATININE 3.7*   ALKPHOS 93   *   *   BILITOT 0.5     No results for input(s): "CPK", "CPKMB", "MB", "TROPONINI" in the last 168 hours.   Recent Labs     07/15/25  0117   PO2 33*   POCSATURATED 63       Electrolytes: Electrolytes replaced  Accuchecks: none    Gtts/LDAs:   amiodarone in dextrose 5%  0.5 mg/min Intravenous Continuous 16.7 mL/hr at 07/15/25 0605 0.5 mg/min at 07/15/25 0605       Lines/Drains/Airways       Central Venous Catheter Line  Duration             Percutaneous Central Line " Triple Lumen 07/15/25 0032 Internal Jugular Right <1 day              Arterial Line  Duration             Arterial Line 07/15/25 0010 Left Radial <1 day              Peripheral Intravenous Line  Duration             Peripheral IV Single Lumen 07/14/25 2100 20 G Anterior;Right Forearm <1 day    Peripheral IV Single Lumen 07/14/25 2258 20 G Anterior;Distal;Left Forearm <1 day                    Skin/Wounds  Bathing/Skin Care: (S) bath, complete;foot care (07/15/25 0005)  Wounds: No  Wound care consulted: No

## 2025-07-15 NOTE — PROCEDURES
"Tim Richards is a 59 y.o. male patient.    Temp: 98.6 °F (37 °C) (07/14/25 2003)  Pulse: 68 (07/14/25 2323)  Resp: 19 (07/14/25 2323)  BP: (!) 90/0 (07/14/25 2323)  SpO2: 95 % (07/14/25 2323)  Height: 6' 1" (185.4 cm) (07/14/25 2003)       Arterial Line    Date/Time: 7/15/2025 12:50 AM  Location procedure was performed: [facility]    Performed by: Veronica Hanson MD  Authorized by: Veronica Hanson MD  Assisting provider: Kelly Us MD  Pre-op Diagnosis: Ventricular fibrillation  Post-operative diagnosis: Ventricular fibrillation  Consent Done: Yes  Consent: Written consent obtained  Risks and benefits: risks, benefits and alternatives were discussed  Consent given by: patient  Patient identity confirmed: MRN and verbally with patient  Preparation: Patient was prepped and draped in the usual sterile fashion.  Indications: hemodynamic monitoring  Location: left radial  Number of attempts: 1  Complications: No  Specimens: No  Implants: No  Post-procedure: line sutured and dressing applied  Post-procedure CMS: normal  Patient tolerance: Patient tolerated the procedure well with no immediate complications      Central Line    Date/Time: 7/15/2025 12:52 AM    Performed by: Veronica Hanson MD  Authorized by: Veronica Hanson MD    Location procedure was performed:  [facility]  Assisting Provider:  Kelly Us MD  Pre-operative diagnosis:  Ventricular Fibrillation  Post-operative diagnosis:  Ventricular Fibrillation  Consent Done ?:  Yes  Time out complete?: Verified correct patient, procedure, equipment, staff, and site/side    Indications:  Vascular access  Preparation:  Skin prepped with ChloraPrep  Location:  Right internal jugular  Catheter size:  7 Fr  Ultrasound guidance: Yes    Manometry: No    Number of attempts:  1  Securement:  Line sutured, chlorhexidine patch, sterile dressing applied and blood return through all ports  Complications: No  "   Specimens: No    Implants: No    XRay:  Placement verified by x-ray      7/15/2025

## 2025-07-15 NOTE — PROGRESS NOTES
07/15/2025  Fitz Christianson    Current provider:  Guerda Bautista MD    Device interrogation:      7/15/2025    10:00 AM 7/15/2025     9:00 AM 7/15/2025     8:00 AM 7/15/2025     7:00 AM 7/15/2025     6:00 AM 7/15/2025     5:00 AM 7/15/2025     4:00 AM   TXP LVAD INTERROGATIONS   Type HeartMate3 HeartMate3 HeartMate3 HeartMate3 HeartMate3 HeartMate3 HeartMate3   Flow 5.9 5.8 5.8 5.8 5.8 5.8 6   Speed 6300 6300 6300 6300 6300 6300 6300   PI 1.8 1.6 1.5 1.6 1.6 1.4 1.4   Power ([place]) 5.5 5.5 5.5 5.5 5.8 5.4 5.6   LSL 5900 5900 5900 5900 5900 5900 5900   Pulsatility Intermittent pulse Intermittent pulse Intermittent pulse Intermittent pulse Intermittent pulse Intermittent pulse Intermittent pulse          Rounded on Tim Richards to ensure all mechanical assist device settings (IABP or VAD) were appropriate and all parameters were within limits.  I was able to ensure all back up equipment was present, the staff had no issues, and the Perfusion Department daily rounding was complete.      For implantable VADs: Interrogation of Ventricular assist device was performed with analysis of device parameters and review of device function. I have personally reviewed the interrogation findings and agree with findings as stated.     In emergency, the nursing units have been notified to contact the perfusion department either by:  Calling [phone] from 630am to 4pm Mon thru Fri, utilizing the On-Call Finder functionality of Epic and searching for Perfusion, or by contacting the hospital  from 4pm to 630am and on weekends and asking to speak with the perfusionist on call.    11:58 AM

## 2025-07-15 NOTE — ED NOTES
Cardiology and Dr. Diez at bedside, pt. Hooked up to continuous monitoring and zoll with pads, Per. Dr. Deiz pt. Is currently in Ventricular Fibrillation at this time, and verbalized a unsynchronized shock of 200 joules.

## 2025-07-15 NOTE — SUBJECTIVE & OBJECTIVE
Past Medical History:   Diagnosis Date    A-fib     Anticoagulant long-term use     Atrial flutter 7/30/2022    CHF (congestive heart failure)     Class 1 obesity due to excess calories with serious comorbidity and body mass index (BMI) of 31.0 to 31.9 in adult     Class 1 obesity due to excess calories with serious comorbidity and body mass index (BMI) of 32.0 to 32.9 in adult     Dilated cardiomyopathy 1/10/2018    Disorder of kidney and ureter     CKD    Encounter for blood transfusion     Essential hypertension 8/28/2022    Gout     HTN (hypertension)     Hx of psychiatric care     ICD (implantable cardioverter-defibrillator) infection 7/1/2020    Psychiatric problem     Thyroid disease     Ventricular tachycardia (paroxysmal)        Past Surgical History:   Procedure Laterality Date    AORTIC VALVULOPLASTY N/A 07/13/2022    Procedure: REPAIR, AORTIC VALVE;  Surgeon: Yg Kaufman MD;  Location: [facility] OR [address] [place] [address];  Service: Cardiovascular;  Laterality: N/A;    APPLICATION OF WOUND VACUUM-ASSISTED CLOSURE DEVICE N/A 07/15/2022    Procedure: APPLICATION, WOUND VAC;  Surgeon: Yg Kaufman MD;  [facility];  Service: Cardiovascular;  Laterality: N/A;  50 x 5 cm     APPLICATION OF WOUND VACUUM-ASSISTED CLOSURE DEVICE Right 09/27/2022    Procedure: APPLICATION, WOUND VAC;  Surgeon: Kole Tabares MD;  Location: [facility] OR [address] [place] [address];  Service: Plastics;  Laterality: Right;    CARDIAC CATHETERIZATION  12/2012    CARDIAC DEFIBRILLATOR PLACEMENT Left     CRRT-D    CARDIAC VALVE REPLACEMENT  03/08/2018    LVAD Implant    COLONOSCOPY N/A 03/06/2018    Procedure: COLONOSCOPY;  Surgeon: Alonso Bone MD;  Location: [address]);  Service: Endoscopy;  Laterality: N/A;    COLONOSCOPY N/A 07/17/2019    Procedure: COLONOSCOPY;  Surgeon: Blane Valdez MD;  Location: [facility] ENDO ([facility]);  Service: Endoscopy;  Laterality: N/A;    COLONOSCOPY N/A 07/18/2019    Procedure: COLONOSCOPY;  Surgeon: Blane Valdez MD;   Location: [facility] ENDO (2ND FLR);  Service: Endoscopy;  Laterality: N/A;    CREATION OF ILIOFEMORAL ARTERY BYPASS Right 09/27/2022    Procedure: CREATION, BYPASS, ARTERIAL, ILIAC TO FEMORAL WITH GRAFT;  Surgeon: Zach Hernández MD;  [facility];  Service: Peripheral Vascular;  Laterality: Right;    CREATION OF MUSCLE ROTATIONAL FLAP Right 09/27/2022    Procedure: CREATION, FLAP, MUSCLE ROTATION, SARTORIUS AND RECTUS FEMORIS;  Surgeon: Kole Tabares MD;  [facility];  Service: Plastics;  Laterality: Right;    ESOPHAGOGASTRODUODENOSCOPY N/A 07/17/2019    Procedure: EGD (ESOPHAGOGASTRODUODENOSCOPY);  Surgeon: Blane Valdez MD;  Location: [facility] ENDO (2ND FLR);  Service: Endoscopy;  Laterality: N/A;    ESOPHAGOGASTRODUODENOSCOPY N/A 07/18/2019    Procedure: EGD (ESOPHAGOGASTRODUODENOSCOPY);  Surgeon: Blane Valdez MD;  Location: [facility] ENDO (2ND FLR);  Service: Endoscopy;  Laterality: N/A;    FOREIGN BODY REMOVAL N/A 07/22/2022    Procedure: REMOVAL, FOREIGN BODY;  Surgeon: Yg Kaufman MD;  [facility];  Service: Cardiovascular;  Laterality: N/A;  LVAD Heartmate 2 drive line removal    INSERTION OF GRAFT TO PERICARDIUM N/A 07/15/2022    Procedure: INSERTION, GRAFT, PERICARDIUM;  Surgeon: Yg Kaufman MD;  [facility];  Service: Cardiovascular;  Laterality: N/A;    IRRIGATION OF MEDIASTINUM N/A 07/15/2022    Procedure: IRRIGATION, MEDIASTINUM;  Surgeon: Yg Kaufman MD;  [facility];  Service: Cardiovascular;  Laterality: N/A;    LYSIS OF ADHESIONS  07/13/2022    Procedure: LYSIS, ADHESIONS;  Surgeon: Yg Kaufman MD;  [facility];  Service: Cardiovascular;;    NONINVASIVE CARDIAC ELECTROPHYSIOLOGY STUDY N/A 10/18/2019    Procedure: CARDIAC ELECTROPHYSIOLOGY STUDY, NONINVASIVE;  Surgeon: Raz Wagner MD;  Location: [facility] EP LAB;  Service: Cardiology;  Laterality: N/A;  VT, DFTs, MDT CRTD in situ, LVAD, LASHELL pizarro, 3535     REPLACEMENT OF IMPLANTABLE CARDIOVERTER-DEFIBRILLATOR (ICD) GENERATOR N/A 03/09/2020    Procedure: REPLACEMENT, ICD GENERATOR;  Surgeon: Harry Yun MD;  Location: [facility] EP LAB;  Service: Cardiology;  Laterality: N/A;  VT, ICD Gen Change and Lead Revision, MDT, MAC, DM,3 Prep    REPLACEMENT OF LEFT VENTRICULAR ASSIST DEVICE (LVAD)  07/13/2022    Procedure: REPLACEMENT, LVAD;  Surgeon: Yg Kaufman MD;  [address];  Service: Cardiovascular;;    REPLACEMENT OF PUMP N/A 07/13/2022    Procedure: REPLACEMENT, PUMP;  Surgeon: Yg Kaufman MD;  [facility];  Service: Cardiovascular;  Laterality: N/A;  LVAD pump exchange  EXPLANATION OF HEATMATE 2  IMPLANTATION OF HEARTMATE 3  IMPLANTATION OF 8MM CHIMNEY GRAFT TO RFA  INITIATION OF ECMO  TEMPORARY CLOSURE OF CHEST    REVISION OF IMPLANTABLE CARDIOVERTER-DEFIBRILLATOR (ICD) ELECTRODE LEAD PLACEMENT N/A 03/09/2020    Procedure: REVISION, INSERTION, ELECTRODE LEAD, ICD;  Surgeon: Harry Yun MD;  Location: [facility] EP LAB;  Service: Cardiology;  Laterality: N/A;  VT, ICD Gen Change and Lead Revision, MDT, MAC, DM,3 Prep    RIGHT HEART CATHETERIZATION Right 09/08/2023    Procedure: INSERTION, CATHETER, RIGHT HEART;  Surgeon: Gaurav Rodriguez MD;  Location: [facility] CATH LAB;  Service: Cardiology;  Laterality: Right;    STERNAL WOUND CLOSURE N/A 07/14/2022    Procedure: CLOSURE, WOUND, STERNUM;  Surgeon: Yg Kaufman MD;  [address]  Service: Cardiovascular;  Laterality: N/A;  temporary closure  evacuation of hematoma    STERNAL WOUND CLOSURE N/A 07/15/2022    Procedure: CLOSURE, WOUND, STERNUM;  Surgeon: Yg Kaufman MD;  [facility];  Service: Cardiovascular;  Laterality: N/A;    TRACHEOSTOMY N/A 08/04/2022    Procedure: CREATION, TRACHEOSTOMY;  Surgeon: Germain Holt MD;  Location: [facility] OR [address] [place] [address];  Service: General;  Laterality: N/A;    TREATMENT OF CARDIAC ARRHYTHMIA  10/18/2019    Procedure:  Cardioversion or Defibrillation;  Surgeon: Raz Wagner MD;  Location: [facility] EP LAB;  Service: Cardiology;;       Review of patient's allergies indicates:   Allergen Reactions    Lisinopril Anaphylaxis    Hydralazine     Hydralazine analogues      Chronic constipation, impotence, dizziness       Current Facility-Administered Medications   Medication    acetaminophen tablet 650 mg    ALPRAZolam tablet 1 mg    amiodarone 360 mg/200 mL (1.8 mg/mL) infusion    [START ON 7/15/2025] amiodarone 360 mg/200 mL (1.8 mg/mL) infusion    [START ON 7/15/2025] amLODIPine tablet 10 mg    [START ON 7/15/2025] busPIRone tablet 5 mg    [START ON 7/15/2025] cefadroxil capsule 500 mg    furosemide (Lasix) 500 mg in 50 mL infusion (conc: 10 mg/mL)    [START ON 7/15/2025] levothyroxine tablet 125 mcg    [START ON 7/15/2025] magnesium oxide tablet 400 mg    melatonin tablet 6 mg    [START ON 7/15/2025] mexiletine capsule 250 mg    [START ON 7/15/2025] mirtazapine tablet 45 mg    [START ON 7/15/2025] pantoprazole EC tablet 40 mg    polyethylene glycol packet 17 g    senna tablet 8.6 mg    simethicone chewable tablet 80 mg    [START ON 7/15/2025] warfarin (COUMADIN) tablet 5 mg     Family History       Problem Relation (Age of Onset)    Cancer Sister (54)    Coronary artery disease Father    Diabetes Father    Heart disease Father    Hypertension Father    No Known Problems Mother, Brother          Tobacco Use    Smoking status: Former     Current packs/day: 0.00     Types: Vaping w/o nicotine, Cigarettes     Start date: 2018     Quit date: 2018     Years since quittin.5     Passive exposure: Never    Smokeless tobacco: Never    Tobacco comments:     pt is quiting on his own - pt stated not qualified for program;  pt  quit on his own   Substance and Sexual Activity    Alcohol use: Never     Comment: quit    Drug use: Never    Sexual activity: Not Currently     Partners: Female     Birth control/protection: None      "Comment: 10/5/17  with same partner 7 years      Review of Systems   Constitutional:  Negative for chills and fever.   Respiratory:  Positive for shortness of breath. Negative for chest tightness.    Cardiovascular:  Positive for palpitations and leg swelling. Negative for chest pain.   Gastrointestinal:  Positive for abdominal distention. Negative for abdominal pain, nausea and vomiting.   Neurological:  Positive for light-headedness.     Objective:     Vital Signs (Most Recent):  Temp: 98.6 °F (37 °C) (07/14/25 2003)  Pulse: (!) 0 (07/14/25 2300)  Resp: 18 (07/14/25 2239)  BP: (!) 88/0 (07/14/25 2235)  SpO2: (!) 94 % (07/14/25 2300) Vital Signs (24h Range):  Temp:  [98.6 °F (37 °C)] 98.6 °F (37 °C)  Pulse:  [0-127] 0  Resp:  [18-22] 18  SpO2:  [82 %-98 %] 94 %  BP: (88-93)/(0) 88/0     No data found.  Body mass index is 31.99 kg/m².    No intake or output data in the 24 hours ending 07/14/25 2321       Physical Exam  Constitutional:       Appearance: Normal appearance.   HENT:      Mouth/Throat:      Mouth: Mucous membranes are moist.   Eyes:      Extraocular Movements: Extraocular movements intact.      Conjunctiva/sclera: Conjunctivae normal.   Cardiovascular:      Rate and Rhythm: Normal rate and regular rhythm.      Comments: JVD to tragus  Pulmonary:      Effort: Pulmonary effort is normal.   Abdominal:      General: Abdomen is flat.      Palpations: Abdomen is soft.      Tenderness: There is no abdominal tenderness.   Musculoskeletal:      Right lower leg: No edema.      Left lower leg: No edema.   Skin:     General: Skin is warm and dry.   Neurological:      General: No focal deficit present.      Mental Status: He is alert and oriented to person, place, and time.            Significant Labs:  CBC:  Recent Labs   Lab 07/14/25  2100   WBC 12.33   RBC 4.97   HGB 14.2   HCT 44.0      MCV 89   MCH 28.6   MCHC 32.3     BNP:  No results for input(s): "BNP" in the last 168 hours.    Invalid input(s): " ""BNPTRIAGELBLO"  CMP:  Recent Labs   Lab 07/14/25  2100   *   CALCIUM 8.9   ALBUMIN 4.0   PROT 8.2   *   K 4.8   CO2 23   CL 98   BUN 61*   CREATININE 4.3*   ALKPHOS 93   *   *   BILITOT 0.5      Coagulation:   Recent Labs   Lab 07/08/25  1332 07/10/25  1353 07/14/25  2100   INR 3.1* 2.7* 3.1*     LDH:  No results for input(s): "LDH" in the last 72 hours.  Microbiology:  Microbiology Results (last 7 days)       ** No results found for the last 168 hours. **            I have reviewed all pertinent labs within the past 24 hours.    Diagnostic Results:  I have reviewed all pertinent imaging results/findings within the past 24 hours.    "

## 2025-07-15 NOTE — ASSESSMENT & PLAN NOTE
Procedure: Device Interrogation Including analysis of device parameters  Current Settings: Ventricular Assist Device  Review of device function is stable/unstable stable        7/14/2025    11:33 PM 7/14/2025     8:44 PM 4/23/2025     8:00 AM 4/23/2025     7:00 AM 4/23/2025     6:00 AM 4/23/2025     5:00 AM 4/23/2025     4:00 AM   TXP LVAD INTERROGATIONS   Type HeartMate3 HeartMate3 HeartMate3  HeartMate3       Flow 6 4..3 5.7 5.9 5.6 5.6 5.9   Speed 6350 6300 6300 6300 6300 6350 6300   PI 1.9 2.9 1.7 1.7 2.3 1.4 1.4   Power ([place]) 5.6 5.2 5.4 5.5 5.2 5.5 5.5   LSL   5900 5900 5900 5950 5900   Pulsatility No Pulse No Pulse Intermittent pulse Intermittent pulse Intermittent pulse Intermittent pulse Intermittent pulse       Data saved with a previous flowsheet row definition

## 2025-07-15 NOTE — ASSESSMENT & PLAN NOTE
Presented with 4 days of lightheadedness and palpitations. On admission, in VF with rate 180-220s. Interrogation noted no recorded events since episodes 04/2025. S/p DCCV x1 with return to sinus rhythm. Compliant with Amiodarone and Mexiletine. Appears volume overloaded on exam, suspected trigger for event. Prior h/o VF/VT, last known episode during admission 04/2025. At that time episodes in the s/o etoh use and off Mexiletine due to cost/insurance coverage. Discharged on PO Amio and Mexiletine and tachytherapies turned off prior to discharge per patient request.   - Continue IV Amiodarone gtt  - Continue Mexiletine  - Management of ADHF, per primary  - K>4, Phos >3, Mg>2  - Tele

## 2025-07-15 NOTE — PROGRESS NOTES
SW attended MDT rounds with physicians, fellows, APPs, pre and post transplant coordinators, and VAD coordinators.  Discussed plan of care and discharge planning.

## 2025-07-15 NOTE — ASSESSMENT & PLAN NOTE
Presented with 4 days of lightheadedness and palpitations. On admission, in VF with rate 180-220s. S/p DCCV x1 with return to sinus rhythm. Compliant with Amiodarone and Mexiletine. Appears volume overloaded on exam, suspected trigger for event. Prior h/o VF/VT, last known episode during admission 04/2025. At that time episodes in the s/o etoh use and off Mexiletine due to cost/insurance coverage. Discharged on PO Amio and Mexiletine and tachytherapies turned off prior to discharge per patient request.   - ADHF management  - EP consult  - Start IV Amiodarone gtt  - Continue Mexiletine  - K>4, Phos >3, Mg>2  - Tele

## 2025-07-15 NOTE — CARE UPDATE
EP Plan of Care Update    Patient seen this AM. No further episodes of ventricular arrhythmias since his DCCV last night. Feeling well at this time. Discussed treatment options including turning on tachytherapies with the patient - he remains adamant at this time that he does not want to be shocked by his device.     Recommendations:  - Continue IV amiodarone for 24 hours then convert back to PO home dosing 400 mg daily  - Continue mexiletine 250 mg TID as tolerated by patient  - Will perform formal device interrogation today - can consider reprogramming to turn back on monitor zone, but he will remain with tachytherapies off.   - EP will continue to follow as needed.    Discussed with Dr. Fay.      Andrae Gordon MD  Cardiovascular Disease Fellow  [facility] [place]

## 2025-07-15 NOTE — ASSESSMENT & PLAN NOTE
Recent Labs     07/14/25  2100 07/15/25  0322   CREATININE 4.3* 3.7*   EGFRNORACEVR 15* 18*      Plan  - WAGNER is improving  - Avoid nephrotoxins and renally dose meds for GFR listed above  - Monitor urine output, serial BMP, and adjust therapy as needed  - hold diuretics, CVP 1, CO 6, CI 2.8

## 2025-07-15 NOTE — EICU
Called to the room for Time out and Procedural surveillance    [x] Verified patient name   [x] Verified patient   [x] Read from armband    Central line    Procedure checklist: Time out flowsheet complete, CXR ordered, LDA added, Bedside procedure charge ordered, and Patient tolerated well

## 2025-07-15 NOTE — HPI
Tim Richards is a 59 y.o. M w a history of NICM, stage D HFrEF s/p HM2 (implanted 3/8/2018 as DT-VAD) but required pump exchange to HM3 (7/13/2022) 2/2 pump thrombosis, VT (on Amiodarone and Mexiletine) s/p S-ICD ([employer]), AFL, hyperthyroidism (Amiodarone induced), and hypothyroidism who presented with lightheadedness, found to be in VF.     Patient reports onset of lightheadedness and palpitations starting 4 days ago. States he had a LFA 2 days ago when trying to stand up. He also notes increased dyspnea, edema of thighs, and abdominal distension. He reports reduced UOP on home Torsemide PO. He denies syncope, fever/chills, chest pain, orthopnea, n/v. Denies increased drainage from DL. He reports compliance with Amiodarone and Mexiletine; no longer prescribed a BB.     Patient recently seen by Dr. Fay on 06/04/25 for history of VT. He had been admitted in 04/2025 for VT storm while off Mexiletine due to cost/insurance coverage and in the s/o recent etoh use, resulting in multiple device shocks. He briefly required IV amiodarone and IV lidocaine before being switched to PO amiodarone and PO mexiletine prior to discharge. He elected to deactivate tachy therapies prior to discharge as he became extraordinarily anxious with shocks. In-clinic ICD check 06/04/25, no VT since admission.    In the ED, patient in ventricular fibrillation on telemetry and confirmed with bedside interrogation of his device. S/p DCCV x1 with return to sinus rhythm. Hemodynamically and clinically stable through event. Labs notable for AST//258 respectively, Cr 4.3 (baseline Cr 2), K 4.8, POC lactate 1.8, INR 3.1.     Patient admitted to the CICU for further management.    Post-VAD complications:  RV failure (early vs. late)-+0  Hemocompatibility-related events (CVA, thrombosis, bleeding)-+HM2 => pump exchange  Infection-negative  Arrhythmias-+ VT/AF/flutter    TTE (4/22/2025):    Left Ventricle: The left ventricle is at the  upper limits of normal in size. Ventricular mass is normal. Mildly increased wall thickness. Mild septal thickening. Severe global hypokinesis present. There is severely reduced systolic function. Ejection fraction is approximately 20% upper teens to low 20s).    Right Ventricle: Right ventricle was not well visualized due to poor acoustic window. The right ventricle has moderate enlargement. Wall thickness is normal. Systolic function is mild-moderately reduced.    Left Atrium: Moderately dilated    Pulmonary Artery: The estimated pulmonary artery systolic pressure is 25 mmHg plus RAP.    LVAD: The aortic valve does not open. The ventricular septum is at midline.HMIII 6300.

## 2025-07-15 NOTE — CONSULTS
[facility]  Cardiac Electrophysiology  Consult Note    Admission Date: 7/14/2025  Code Status: Full Code   Attending Provider: Guerda Bautista MD  Consulting Provider: Kelly Us MD  Principal Problem:Ventricular fibrillation    Inpatient consult to Electrophysiology  Consult performed by: Kelly Us MD  Consult ordered by: Kelly Us MD  Reason for consult: VF        Subjective:     Chief Complaint:  Lightheadedness     HPI:   Tim Richards is a 59 y.o. M w a history of NICM, stage D HFrEF s/p HM2 (implanted 3/8/2018 as DT-VAD) but required pump exchange to HM3 (7/13/2022) 2/2 pump thrombosis, VT (on Amiodarone and Mexiletine) s/p S-ICD ([employer]), AFL, hyperthyroidism (Amiodarone induced), and hypothyroidism who presented with lightheadedness, found to be in VF. EP consulted for management of VF.      Patient reports onset of lightheadedness and palpitations starting 4 days ago. States he had a LFA 2 days ago when trying to stand up. He also notes increased dyspnea, edema of thighs, and abdominal distension. He reports reduced UOP on home Torsemide PO. He denies syncope, fever/chills, chest pain, orthopnea, n/v. Denies increased drainage from DL. He reports compliance with Amiodarone and Mexiletine; no longer prescribed a BB.      Patient recently seen in clinic by Dr. Fay on 06/04/25 for history of VT, at which time he was feeling well. He had been admitted in 04/2025 for VT storm while off Mexiletine due to cost/insurance coverage and in the s/o recent etoh use, resulting in multiple device shocks. He briefly required IV amiodarone and IV lidocaine before being switched to PO amiodarone and PO mexiletine prior to discharge. He elected to deactivate tachy therapies prior to discharge as he became extraordinarily anxious with shocks. In-clinic ICD check 06/04/25, no VT since admission.     In the ED, patient in ventricular fibrillation on telemetry and confirmed with bedside  interrogation of his device. Interrogation noted no recorded episodes since events in April. S/p DCCV x1 with return to sinus rhythm. Hemodynamically and clinically stable through event. Labs notable for AST//258 respectively, Cr 4.3 (baseline Cr 2), K 4.8, POC lactate 1.8, INR 3.1.       Past Medical History:   Diagnosis Date    A-fib     Anticoagulant long-term use     Atrial flutter 7/30/2022    CHF (congestive heart failure)     Class 1 obesity due to excess calories with serious comorbidity and body mass index (BMI) of 31.0 to 31.9 in adult     Class 1 obesity due to excess calories with serious comorbidity and body mass index (BMI) of 32.0 to 32.9 in adult     Dilated cardiomyopathy 1/10/2018    Disorder of kidney and ureter     CKD    Encounter for blood transfusion     Essential hypertension 8/28/2022    Gout     HTN (hypertension)     Hx of psychiatric care     ICD (implantable cardioverter-defibrillator) infection 7/1/2020    Psychiatric problem     Thyroid disease     Ventricular tachycardia (paroxysmal)        Past Surgical History:   Procedure Laterality Date    AORTIC VALVULOPLASTY N/A 07/13/2022    Procedure: REPAIR, AORTIC VALVE;  Surgeon: Yg Kaufman MD;  Location: [address];  Service: Cardiovascular;  Laterality: N/A;    APPLICATION OF WOUND VACUUM-ASSISTED CLOSURE DEVICE N/A 07/15/2022    Procedure: APPLICATION, WOUND VAC;  Surgeon: Yg Kaufman MD;  Location: [address];  Service: Cardiovascular;  Laterality: N/A;  50 x 5 cm     APPLICATION OF WOUND VACUUM-ASSISTED CLOSURE DEVICE Right 09/27/2022    Procedure: APPLICATION, WOUND VAC;  Surgeon: Kole Tabares MD;  Location: [address];  Service: Plastics;  Laterality: Right;    CARDIAC CATHETERIZATION  12/2012    CARDIAC DEFIBRILLATOR PLACEMENT Left     CRRT-D    CARDIAC VALVE REPLACEMENT  03/08/2018    LVAD Implant    COLONOSCOPY N/A 03/06/2018    Procedure: COLONOSCOPY;  Surgeon: Alonso Bone MD;  Location:  [facility] ENDO (2ND FLR);  Service: Endoscopy;  Laterality: N/A;    COLONOSCOPY N/A 07/17/2019    Procedure: COLONOSCOPY;  Surgeon: Blane Valdez MD;  Location: [facility] ENDO (2ND FLR);  Service: Endoscopy;  Laterality: N/A;    COLONOSCOPY N/A 07/18/2019    Procedure: COLONOSCOPY;  Surgeon: Blane Valdez MD;  Location: [facility] ENDO (2ND FLR);  Service: Endoscopy;  Laterality: N/A;    CREATION OF ILIOFEMORAL ARTERY BYPASS Right 09/27/2022    Procedure: CREATION, BYPASS, ARTERIAL, ILIAC TO FEMORAL WITH GRAFT;  Surgeon: Zach Hernández MD;  Location: [facility] OR 2ND FLR;  Service: Peripheral Vascular;  Laterality: Right;    CREATION OF MUSCLE ROTATIONAL FLAP Right 09/27/2022    Procedure: CREATION, FLAP, MUSCLE ROTATION, SARTORIUS AND RECTUS FEMORIS;  Surgeon: Kole Tabares MD;  [facility];  Service: Plastics;  Laterality: Right;    ESOPHAGOGASTRODUODENOSCOPY N/A 07/17/2019    Procedure: EGD (ESOPHAGOGASTRODUODENOSCOPY);  Surgeon: Blane Valdez MD;  Location: [facility] ENDO (2ND FLR);  Service: Endoscopy;  Laterality: N/A;    ESOPHAGOGASTRODUODENOSCOPY N/A 07/18/2019    Procedure: EGD (ESOPHAGOGASTRODUODENOSCOPY);  Surgeon: Blane Valdez MD;  Location: [facility] ENDO (2ND FLR);  Service: Endoscopy;  Laterality: N/A;    FOREIGN BODY REMOVAL N/A 07/22/2022    Procedure: REMOVAL, FOREIGN BODY;  Surgeon: Yg Kaufman MD;  Location: [facility] OR [facility] FLR;  Service: Cardiovascular;  Laterality: N/A;  LVAD Heartmate 2 drive line removal    INSERTION OF GRAFT TO PERICARDIUM N/A 07/15/2022    Procedure: INSERTION, GRAFT, PERICARDIUM;  Surgeon: Yg Kaufman MD;  Location: [facility] OR [facility] FLR;  Service: Cardiovascular;  Laterality: N/A;    IRRIGATION OF MEDIASTINUM N/A 07/15/2022    Procedure: IRRIGATION, MEDIASTINUM;  Surgeon: Yg Kaufman MD;  Location: [facility] OR [facility] FLR;  Service: Cardiovascular;  Laterality: N/A;    LYSIS OF ADHESIONS  07/13/2022    Procedure: LYSIS, ADHESIONS;  Surgeon: gY Kaufman MD;  Location: [facility] OR 2ND FLR;   Service: Cardiovascular;;    NONINVASIVE CARDIAC ELECTROPHYSIOLOGY STUDY N/A 10/18/2019    Procedure: CARDIAC ELECTROPHYSIOLOGY STUDY, NONINVASIVE;  Surgeon: Raz Wagner MD;  Location: [facility] EP LAB;  Service: Cardiology;  Laterality: N/A;  VT, DFTs, MDT CRTD in situ, LVAD, juarez, MB, 3098    REPLACEMENT OF IMPLANTABLE CARDIOVERTER-DEFIBRILLATOR (ICD) GENERATOR N/A 03/09/2020    Procedure: REPLACEMENT, ICD GENERATOR;  Surgeon: Harry Yun MD;  Location: [facility] EP LAB;  Service: Cardiology;  Laterality: N/A;  VT, ICD Gen Change and Lead Revision, MDT, MAC, DM,3 Prep    REPLACEMENT OF LEFT VENTRICULAR ASSIST DEVICE (LVAD)  07/13/2022    Procedure: REPLACEMENT, LVAD;  Surgeon: Yg Kaufman MD;  [facility];  Service: Cardiovascular;;    REPLACEMENT OF PUMP N/A 07/13/2022    Procedure: REPLACEMENT, PUMP;  Surgeon: Yg Kaufman MD;  [facility];  Service: Cardiovascular;  Laterality: N/A;  LVAD pump exchange  EXPLANATION OF HEATMATE 2  IMPLANTATION OF HEARTMATE 3  IMPLANTATION OF 8MM CHIMNEY GRAFT TO RFA  INITIATION OF ECMO  TEMPORARY CLOSURE OF CHEST    REVISION OF IMPLANTABLE CARDIOVERTER-DEFIBRILLATOR (ICD) ELECTRODE LEAD PLACEMENT N/A 03/09/2020    Procedure: REVISION, INSERTION, ELECTRODE LEAD, ICD;  Surgeon: Harry Yun MD;  Location: [facility] EP LAB;  Service: Cardiology;  Laterality: N/A;  VT, ICD Gen Change and Lead Revision, MDT, MAC, DM,3 Prep    RIGHT HEART CATHETERIZATION Right 09/08/2023    Procedure: INSERTION, CATHETER, RIGHT HEART;  Surgeon: Gaurav Rodriguez MD;  Location: [facility] CATH LAB;  Service: Cardiology;  Laterality: Right;    STERNAL WOUND CLOSURE N/A 07/14/2022    Procedure: CLOSURE, WOUND, STERNUM;  Surgeon: Yg Kaufman MD;  [facility];  Service: Cardiovascular;  Laterality: N/A;  temporary closure  evacuation of hematoma    STERNAL WOUND CLOSURE N/A 07/15/2022    Procedure: CLOSURE, WOUND, STERNUM;  Surgeon: Yg Kaufman MD;  Location:  [facility] OR [facility] FLR;  Service: Cardiovascular;  Laterality: N/A;    TRACHEOSTOMY N/A 08/04/2022    Procedure: CREATION, TRACHEOSTOMY;  Surgeon: Germain Holt MD;  Location: [facility] OR [facility] FLR;  Service: General;  Laterality: N/A;    TREATMENT OF CARDIAC ARRHYTHMIA  10/18/2019    Procedure: Cardioversion or Defibrillation;  Surgeon: Raz Wagner MD;  Location: [facility] EP LAB;  Service: Cardiology;;       Review of patient's allergies indicates:   Allergen Reactions    Lisinopril Anaphylaxis    Hydralazine     Hydralazine analogues      Chronic constipation, impotence, dizziness       No current facility-administered medications on file prior to encounter.     Current Outpatient Medications on File Prior to Encounter   Medication Sig    ALPRAZolam (XANAX) 1 MG tablet Take 1 mg by mouth 2 (two) times daily as needed.    amiodarone (PACERONE) 400 MG tablet Take 1 tablet (400 mg total) by mouth 2 (two) times daily. Decrease to 400mg orally daily on May 7th    amLODIPine (NORVASC) 10 MG tablet Take 1 tablet (10 mg total) by mouth once daily.    busPIRone (BUSPAR) 5 MG Tab Take 1 tablet (5 mg total) by mouth 2 (two) times daily.    cefadroxil (DURICEF) 500 MG Cap Take 1 capsule (500 mg total) by mouth every 12 (twelve) hours.    cyanocobalamin 1,000 mcg/mL injection INJECT 1 ML (1,000 MCG TOTAL) INTO THE SKIN ONCE A WEEK. FOR 24 DOSES    ferrous gluconate 324 mg (37.5 mg iron) Tab tablet TAKE 1 TABLET BY MOUTH 2 TIMES DAILY WITH MEALS.    levothyroxine (SYNTHROID) 125 MCG tablet Take 1 tablet (125 mcg total) by mouth before breakfast.    magnesium oxide (MAG-OX) 400 mg (241.3 mg magnesium) tablet Take 1 tablet (400 mg total) by mouth once daily.    mexiletine (MEXITIL) 250 MG Cap Take 1 capsule (250 mg total) by mouth every 8 (eight) hours.    mexiletine (MEXITIL) 250 MG Cap     mirtazapine (REMERON) 45 MG tablet Take 1 tablet (45 mg total) by mouth every evening.    omega-3 acid ethyl esters (LOVAZA) 1 gram capsule Take  2 capsules (2 g total) by mouth 2 (two) times daily.    pantoprazole (PROTONIX) 40 MG tablet Take 1 tablet (40 mg total) by mouth daily with lunch.    potassium chloride SA (K-DUR,KLOR-CON) 20 MEQ tablet Take orally daily when taking torsemide    torsemide (DEMADEX) 20 MG Tab Take 1 tablets (20mg) on .    warfarin (COUMADIN) 5 MG tablet Take 1 tablet (5 mg total) by mouth Daily.    zolpidem (AMBIEN CR) 12.5 MG CR tablet TAKE 1 TABLET (12.5 MG TOTAL) BY MOUTH NIGHTLY AS NEEDED FOR INSOMNIA.    [DISCONTINUED] sildenafiL (VIAGRA) 100 MG tablet Take 1 tablet (100 mg total) by mouth daily as needed for Erectile Dysfunction.     Family History       Problem Relation (Age of Onset)    Cancer Sister (54)    Coronary artery disease Father    Diabetes Father    Heart disease Father    Hypertension Father    No Known Problems Mother, Brother          Tobacco Use    Smoking status: Former     Current packs/day: 0.00     Types: Vaping w/o nicotine, Cigarettes     Start date: 2018     Quit date: 2018     Years since quittin.5     Passive exposure: Never    Smokeless tobacco: Never    Tobacco comments:     pt is quiting on his own - pt stated not qualified for program;  pt  quit on his own   Substance and Sexual Activity    Alcohol use: Never     Comment: quit    Drug use: Never    Sexual activity: Not Currently     Partners: Female     Birth control/protection: None     Comment: 10/5/17  with same partner 7 years      Review of Systems   Constitutional: Negative for chills and fever.   Cardiovascular:  Positive for dyspnea on exertion, leg swelling and palpitations. Negative for chest pain, near-syncope, orthopnea and syncope.   Respiratory:  Positive for shortness of breath.    Gastrointestinal:  Positive for bloating. Negative for abdominal pain, nausea and vomiting.   Neurological:  Positive for light-headedness.     Objective:     Vital Signs (Most Recent):  Temp: 98.6 °F (37 °C) (25  2003)  Pulse: 68 (07/14/25 2323)  Resp: 19 (07/14/25 2323)  BP: (!) 90/0 (07/14/25 2323)  SpO2: 95 % (07/14/25 2323) Vital Signs (24h Range):  Temp:  [98.6 °F (37 °C)] 98.6 °F (37 °C)  Pulse:  [0-127] 68  Resp:  [18-22] 19  SpO2:  [82 %-98 %] 95 %  BP: (88-93)/(0) 90/0          Body mass index is 31.99 kg/m².    SpO2: 95 %        Physical Exam  Constitutional:       Appearance: Normal appearance.   HENT:      Mouth/Throat:      Mouth: Mucous membranes are moist.   Eyes:      Extraocular Movements: Extraocular movements intact.      Conjunctiva/sclera: Conjunctivae normal.   Cardiovascular:      Rate and Rhythm: Normal rate and regular rhythm.      Comments: JVD to tragus  Pulmonary:      Effort: Pulmonary effort is normal.   Abdominal:      General: Abdomen is flat.      Palpations: Abdomen is soft.   Skin:     General: Skin is warm and dry.   Neurological:      General: No focal deficit present.      Mental Status: He is alert and oriented to person, place, and time.            Significant Labs: All pertinent lab results from the last 24 hours have been reviewed.    Significant Imaging: X-Ray: CXR: X-Ray Chest 1 View (CXR): No results found for this visit on 07/14/25.        Assessment and Plan:     * Ventricular fibrillation  Presented with 4 days of lightheadedness and palpitations. On admission, in VF with rate 180-220s. Interrogation noted no recorded events since episodes 04/2025. S/p DCCV x1 with return to sinus rhythm. Compliant with Amiodarone and Mexiletine. Appears volume overloaded on exam, suspected trigger for event. Prior h/o VF/VT, last known episode during admission 04/2025. At that time episodes in the s/o etoh use and off Mexiletine due to cost/insurance coverage. Discharged on PO Amio and Mexiletine and tachytherapies turned off prior to discharge per patient request.   - Continue IV Amiodarone gtt  - Continue Mexiletine  - Management of ADHF, per primary  - K>4, Phos >3, Mg>2  - Tele      Thank  you for your consult. I will follow-up with patient. Please contact us if you have any additional questions.    Kelly Us MD  Cardiac Electrophysiology  [facility]

## 2025-07-15 NOTE — EICU
Called to the room for Time out and Procedural surveillance    [x] Verified patient name   [x] Verified patient   [x] Read from armband    Arterial line    Procedure checklist: Time out flowsheet complete, LDA added, Bedside procedure charge ordered, and Patient tolerated well

## 2025-07-15 NOTE — PROGRESS NOTES
[facility]  Cardiology  Progress Note    Patient Name: Tim Richards  MRN: [id number]  Admission Date: 7/14/2025  Hospital Length of Stay: 1 days  Code Status: Full Code   Attending Physician: Guerda Bautista MD   Primary Care Physician: Diego Daniel MD  Expected Discharge Date:   Principal Problem:Ventricular fibrillation    Subjective:     Hospital Course:   Patient has a history of nonischemic cardiomyopathy, stage D heart failure reduced EF status post hm 2 in 2018 as destination therapy, history of pump thrombosis, VT on anticoagulations, presented to the hospital with lightheadedness and palpitation along with low-flow alarms, found to be in VF.     Interval History:   Patient seen and examined this morning, no acute overnight events, feels okay, no further low-flow alarms, stable on amiodarone, hemodynamics with CVP of plan, SVo2  63, C0-CI 60 and 2.8 respectively.  Was initially started on Lasix drip which was later on stopped,    Review of Systems   Constitutional: Negative for chills, decreased appetite and diaphoresis.   HENT:  Negative for congestion and ear discharge.    Eyes:  Negative for blurred vision, discharge and pain.   Cardiovascular:  Negative for chest pain, claudication, dyspnea on exertion and irregular heartbeat.   Respiratory:  Negative for cough, hemoptysis and shortness of breath.    Gastrointestinal:  Negative for bloating, abdominal pain, flatus, heartburn, nausea and vomiting.   Genitourinary:  Negative for bladder incontinence, hematuria and hesitancy.   Neurological:  Positive for dizziness and light-headedness. Negative for aphonia, brief paralysis, focal weakness and weakness.     Objective:     Vital Signs (Most Recent):  Temp: 98.5 °F (36.9 °C) (07/15/25 0800)  Pulse: 60 (07/15/25 1000)  Resp: (!) 25 (07/15/25 1000)  BP: (!) 88/0 (07/15/25 0800)  SpO2: 95 % (07/15/25 1000) Vital Signs (24h Range):  Temp:  [98.5 °F (36.9 °C)-98.6 °F (37 °C)] 98.5 °F  (36.9 °C)  Pulse:  [0-127] 60  Resp:  [15-25] 25  SpO2:  [82 %-98 %] 95 %  BP: (88-93)/(0) 88/0  Arterial Line BP: ()/(23-86) 94/79     Weight: (S) 107.5 kg (236 lb 15.9 oz)  Body mass index is 31.27 kg/m².     SpO2: 95 %         Intake/Output Summary (Last 24 hours) at 7/15/2025 1036  Last data filed at 7/15/2025 1000  Gross per 24 hour   Intake 607.54 ml   Output 2100 ml   Net -1492.46 ml       Lines/Drains/Airways       Central Venous Catheter Line  Duration             Percutaneous Central Line Triple Lumen 07/15/25 0032 Internal Jugular Right <1 day              Arterial Line  Duration             Arterial Line 07/15/25 0010 Left Radial <1 day              Peripheral Intravenous Line  Duration             Peripheral IV Single Lumen 07/14/25 2100 20 G Anterior;Right Forearm <1 day    Peripheral IV Single Lumen 07/14/25 2258 20 G Anterior;Distal;Left Forearm <1 day                       Physical Exam  Constitutional:       General: He is not in acute distress.     Appearance: Normal appearance. He is not ill-appearing.   Cardiovascular:      Pulses: Normal pulses.      Heart sounds: No murmur heard.     Comments: LVAD hum  Pulmonary:      Effort: Pulmonary effort is normal. No respiratory distress.   Abdominal:      General: Abdomen is flat. Bowel sounds are normal. There is no distension.   Musculoskeletal:         General: No swelling or tenderness.      Cervical back: No rigidity.   Skin:     Capillary Refill: Capillary refill takes less than 2 seconds.   Neurological:      General: No focal deficit present.      Mental Status: He is alert.            Significant Labs: BMP:   Recent Labs   Lab 07/14/25  2100 07/15/25  0008 07/15/25  0322   *  --  103   *  --  136   K 4.8  --  3.5   CL 98  --  97   CO2 23  --  28   BUN 61*  --  55*   CREATININE 4.3*  --  3.7*   CALCIUM 8.9  --  9.0   MG  --  3.0* 2.9*    and CMP   Recent Labs   Lab 07/14/25  2100 07/15/25  0322   * 136   K 4.8 3.5   CL  98 97   CO2 23 28   * 103   BUN 61* 55*   CREATININE 4.3* 3.7*   CALCIUM 8.9 9.0   PROT 8.2 8.3   ALBUMIN 4.0 4.0  4.0   BILITOT 0.5 0.5   ALKPHOS 93 93   * 210*   * 244*   ANIONGAP 13 11       Assessment and Plan:         * Ventricular fibrillation  Patient presented with lightheadedness, dizziness had low-flow alarms.    -started on amiodarone drip, we will continue with same, EP consulted, we will keep on drip for 24 hours and switch to home p.o. dose of amiodarone.  -continue with mexiletine.  -magnesium 2.9, potassium 3.5, 40 mEq of potassium.  -device interrogation.    Acute kidney injury superimposed on CKD    Recent Labs     07/14/25  2100 07/15/25  0322   CREATININE 4.3* 3.7*   EGFRNORACEVR 15* 18*      Plan  - WAGNER is improving  - Avoid nephrotoxins and renally dose meds for GFR listed above  - Monitor urine output, serial BMP, and adjust therapy as needed  - hold diuretics, CVP 1, CO 6, CI 2.8    LVAD (left ventricular assist device) present  Procedure: Device Interrogation Including analysis of device parameters  Current Settings: Ventricular Assist Device  Review of device function is stable      7/15/2025    10:00 AM 7/15/2025     9:00 AM 7/15/2025     8:00 AM 7/15/2025     7:00 AM 7/15/2025     6:00 AM 7/15/2025     5:00 AM 7/15/2025     4:00 AM   TXP LVAD INTERROGATIONS   Type HeartMate3 HeartMate3 HeartMate3 HeartMate3 HeartMate3 HeartMate3 HeartMate3   Flow 5.9 5.8 5.8 5.8 5.8 5.8 6   Speed 6300 6300 6300 6300 6300 6300 6300   PI 1.8 1.6 1.5 1.6 1.6 1.4 1.4   Power ([place]) 5.5 5.5 5.5 5.5 5.8 5.4 5.6   LSL 5900 5900 5900 5900 5900 5900 5900   Pulsatility Intermittent pulse Intermittent pulse Intermittent pulse Intermittent pulse Intermittent pulse Intermittent pulse Intermittent pulse             Depression with anxiety  Stable mood, keep on home meds as appropriate.       amiodarone induced hypothyrodism  C/w home dose levothyroxine.     Hypertension  Patient's blood pressure  range in the last 24 hours was: BP  Min: 88/0  Max: 93/0.The patient's inpatient anti-hypertensive regimen is listed below:  Current Antihypertensives  amLODIPine tablet 10 mg, Daily, Oral    Plan  - BP is controlled, no changes needed to their regimen          VTE Risk Mitigation (From admission, onward)           Ordered     warfarin (COUMADIN) tablet 2.5 mg  Every Mon, Wed, Fri         07/15/25 0946     warfarin (COUMADIN) tablet 5 mg  Every Tues, Thurs, Sat, Sun         07/15/25 0946                    Maurizio Johnston MD  Cardiology  [facility] - Cardiac Intensive Care

## 2025-07-15 NOTE — EICU
"Virtual ICU Admission    Admit Date: 2025  LOS: 0  Code Status: Full Code   : 1965  Bed: [id number] A:     Diagnosis: V fib    Patient  has a past medical history of A-fib, Anticoagulant long-term use, Atrial flutter, CHF (congestive heart failure), Class 1 obesity due to excess calories with serious comorbidity and body mass index (BMI) of 31.0 to 31.9 in adult, Class 1 obesity due to excess calories with serious comorbidity and body mass index (BMI) of 32.0 to 32.9 in adult, Dilated cardiomyopathy, Disorder of kidney and ureter, Encounter for blood transfusion, Essential hypertension, Gout, HTN (hypertension), psychiatric care, ICD (implantable cardioverter-defibrillator) infection, Psychiatric problem, Thyroid disease, and Ventricular tachycardia (paroxysmal).    Last VS: BP (!) 88/0   Pulse (!) 0   Temp 98.6 °F (37 °C) (Oral)   Resp 18   Ht 6' 1" (1.854 m)   SpO2 (!) 94%   BMI 31.99 kg/m²       VICU Review    VICU nurse assessment :  [place] completed, LDA documentation reconciliation completed, and VTE prophylaxis review                  "

## 2025-07-15 NOTE — ASSESSMENT & PLAN NOTE
History of NICM, stage D HFrEF s/p HM2 (implanted 3/8/2018 as DT-VAD) but required pump exchange to HM3 (7/13/2022) 2/2 pump thrombosis. Presented with dyspnea, thigh edema, and abdominal distension, as well as reduced UOP. Volume overloaded on exam with JVD and pulmonary edema on cxr. C/w acute decompensated heart failure.   - BNP  - Lasix 80mg IVP, then started Lasix gtt at 10 mg/hr  - Not prescribed GDMT  - CVP and Svo2 monitoring  - Strict I&Os, weights qd

## 2025-07-15 NOTE — EICU
Virtual ICU Quality Rounds    Admit Date: 7/14/2025  Hospital Day: 1    ICU Day: 11h    24H Vital Sign Range:  Temp:  [98.5 °F (36.9 °C)-98.6 °F (37 °C)]   Pulse:  [0-127]   Resp:  [15-25]   BP: (88-93)/(0)   SpO2:  [82 %-98 %]   Arterial Line BP: ()/(23-86)     VICU Surveillance Screening  Daily review of  line necessity(optional): Central line(s) in place  Central line type (optional): Triple lumen catheter  Central line indications : Amiodarone  LDA reconciliation : Yes  Nursing orders placed : IP JOSE Central Line Care and IP JOSE Peripheral IV Access

## 2025-07-15 NOTE — SUBJECTIVE & OBJECTIVE
Past Medical History:   Diagnosis Date    A-fib     Anticoagulant long-term use     Atrial flutter 7/30/2022    CHF (congestive heart failure)     Class 1 obesity due to excess calories with serious comorbidity and body mass index (BMI) of 31.0 to 31.9 in adult     Class 1 obesity due to excess calories with serious comorbidity and body mass index (BMI) of 32.0 to 32.9 in adult     Dilated cardiomyopathy 1/10/2018    Disorder of kidney and ureter     CKD    Encounter for blood transfusion     Essential hypertension 8/28/2022    Gout     HTN (hypertension)     Hx of psychiatric care     ICD (implantable cardioverter-defibrillator) infection 7/1/2020    Psychiatric problem     Thyroid disease     Ventricular tachycardia (paroxysmal)        Past Surgical History:   Procedure Laterality Date    AORTIC VALVULOPLASTY N/A 07/13/2022    Procedure: REPAIR, AORTIC VALVE;  Surgeon: Yg Kaufman MD;  Location: [facility] OR [address] [place] [address];  Service: Cardiovascular;  Laterality: N/A;    APPLICATION OF WOUND VACUUM-ASSISTED CLOSURE DEVICE N/A 07/15/2022    Procedure: APPLICATION, WOUND VAC;  Surgeon: Yg Kaufman MD;  [facility];  Service: Cardiovascular;  Laterality: N/A;  50 x 5 cm     APPLICATION OF WOUND VACUUM-ASSISTED CLOSURE DEVICE Right 09/27/2022    Procedure: APPLICATION, WOUND VAC;  Surgeon: Kole Tabares MD;  Location: [facility] OR [address] [place] [address];  Service: Plastics;  Laterality: Right;    CARDIAC CATHETERIZATION  12/2012    CARDIAC DEFIBRILLATOR PLACEMENT Left     CRRT-D    CARDIAC VALVE REPLACEMENT  03/08/2018    LVAD Implant    COLONOSCOPY N/A 03/06/2018    Procedure: COLONOSCOPY;  Surgeon: Alonso Bone MD;  Location: [address]);  Service: Endoscopy;  Laterality: N/A;    COLONOSCOPY N/A 07/17/2019    Procedure: COLONOSCOPY;  Surgeon: Blane Valdez MD;  [facility]  Service: Endoscopy;  Laterality: N/A;    COLONOSCOPY N/A 07/18/2019    Procedure: COLONOSCOPY;  Surgeon: Blane Valdez MD;   Surgeon:  Jaswant Giron MD, PhD, FACS    Diagnosis: Deviated nasal septum   J34.2    Chronic Maxillary Sinusitis  J32.0    Chronic Ethmoid Sinusitis  J32.2        Procedure: Septoplasty    48792    ESS with      Ethmoidectomy, complete 54444  (left)     Ethmoidectomy, partial 76340  (right)     Maxillary antrostomy,      rem tissue  65573 (bilateral)    Navigation    34324        The correct patient and procedure were confirmed.  The risks and benefits of the procedure were explained and understood.  The patient underwent general anesthesia.  Intubation was uneventful.  LMA used        Nasal Septal Reconstruction    83915    A hemitransfixion incision was made on the right  side of the nasal septum.  A muco-perichondrial flap was elevated on the right  side using a variety of elevators.  The flap was taken posteriorly to the vomer and down onto the floor of the nose.  A thin strip of septal cartilage was removed along the crest.  The obstructing bone of the maxillary crest was removed with a chisel and Gurwinder forceps.  Deflected obstructing cartilage was trimmed with the chisel and a [place] elevator.  Fragments of cartilage were removed with the Gurwinder forceps. Silicone septal splints were secured in place with 3-0 nylon    all neurosurgical cottonoids and injected 1% lidocaine with 1:100,000 epinephrine.    Computerized image-guided navigation (65980)  The Fusion navigational system was used.   Registration was uneventful, with an accuracy of location +/- 2.0 mm.  The suction probes and the debrider were calibrated and used throughout the case, providing increased accuracy with respect to extent and location of disease and increased patient safety.  Indications for use of the navigator image guidance system include:  • Disease adjacent to skull base and orbit(s)  • Non-routine anatomic variations    Ethmoid sinus  (00394 & 83814)  The ethmoids were address endoscopically using a 4 mm zero degree telescope.   Location: [facility] ENDO (2ND FLR);  Service: Endoscopy;  Laterality: N/A;    CREATION OF ILIOFEMORAL ARTERY BYPASS Right 09/27/2022    Procedure: CREATION, BYPASS, ARTERIAL, ILIAC TO FEMORAL WITH GRAFT;  Surgeon: Zach Hernández MD;  [facility];  Service: Peripheral Vascular;  Laterality: Right;    CREATION OF MUSCLE ROTATIONAL FLAP Right 09/27/2022    Procedure: CREATION, FLAP, MUSCLE ROTATION, SARTORIUS AND RECTUS FEMORIS;  Surgeon: Kole Tabares MD;  [facility];  Service: Plastics;  Laterality: Right;    ESOPHAGOGASTRODUODENOSCOPY N/A 07/17/2019    Procedure: EGD (ESOPHAGOGASTRODUODENOSCOPY);  Surgeon: Blane Valdez MD;  Location: [facility] ENDO (2ND FLR);  Service: Endoscopy;  Laterality: N/A;    ESOPHAGOGASTRODUODENOSCOPY N/A 07/18/2019    Procedure: EGD (ESOPHAGOGASTRODUODENOSCOPY);  Surgeon: Blane Valdez MD;  Location: [facility] ENDO (2ND FLR);  Service: Endoscopy;  Laterality: N/A;    FOREIGN BODY REMOVAL N/A 07/22/2022    Procedure: REMOVAL, FOREIGN BODY;  Surgeon: Yg Kaufman MD;  [facility];  Service: Cardiovascular;  Laterality: N/A;  LVAD Heartmate 2 drive line removal    INSERTION OF GRAFT TO PERICARDIUM N/A 07/15/2022    Procedure: INSERTION, GRAFT, PERICARDIUM;  Surgeon: Yg Kaufman MD;  [facility];  Service: Cardiovascular;  Laterality: N/A;    IRRIGATION OF MEDIASTINUM N/A 07/15/2022    Procedure: IRRIGATION, MEDIASTINUM;  Surgeon: Yg Kaufman MD;  [facility];  Service: Cardiovascular;  Laterality: N/A;    LYSIS OF ADHESIONS  07/13/2022    Procedure: LYSIS, ADHESIONS;  Surgeon: Yg Kaufman MD;  [facility];  Service: Cardiovascular;;    NONINVASIVE CARDIAC ELECTROPHYSIOLOGY STUDY N/A 10/18/2019    Procedure: CARDIAC ELECTROPHYSIOLOGY STUDY, NONINVASIVE;  Surgeon: Raz Wagner MD;  Location: [facility] EP LAB;  Service: Cardiology;  Laterality: N/A;  VT, DFTs, MDT CRTD in situ, LVAD, LASHELL pizarro, 1445     Obstructing polyps were removed using the sinus debrider to allow access to the middle meatus.  The uncinate process was taken down using a [place] elevator to incise the mucosa and bone, and then removed with straight sinus forceps.  The medial orbital wall was confirmed using the computer-assisted navigational system.  The ethmoid bulla was then opened up and removed using the Hummer debrider and straight forceps.  Polypoid tissue was removed with debrider and straight and upward grasping forceps.  The posterior ethmoid cells were cleaned and removed in a similar fashion.  The extent of the ethmoid system was confirmed using the navigator to assure complete removal of disease.  The orbital anatomy, skull base, olfactory area and posterior extent of the ethmoid system were confirmed with the navigator.  The procedure was performed bilaterally, right and left.  Partial on the right, complete on the left.      Maxillary sinus (62829)  Following removal of the uncinate process and ethmoid polyps, the natural ostium of the maxillary sinus was enlarged using cutting forceps and back-biting forceps.  Loose soft and nigel tissue was removed using the sinus debrider.  Inflamed and diseased soft tissue and polypoid tissue in the maxillary sinus proper was removed using upward grasping forceps, curettes and the debrider.  The sinus was copiously irrigated with a curved suction tip. The procedure was performed bilaterally, right and left.  ssible with the zero degree scope.  This procedure was performed bilaterally, right and left.      EBL was about  100 cc.  Posisep X hemostatic packing was placed in the nose  Propel implants in the ethmoid cavities.    The patient tolerated the procedure well.               REPLACEMENT OF IMPLANTABLE CARDIOVERTER-DEFIBRILLATOR (ICD) GENERATOR N/A 03/09/2020    Procedure: REPLACEMENT, ICD GENERATOR;  Surgeon: Harry Yun MD;  Location: [facility] EP LAB;  Service: Cardiology;  Laterality: N/A;  VT, ICD Gen Change and Lead Revision, MDT, MAC, DM,3 Prep    REPLACEMENT OF LEFT VENTRICULAR ASSIST DEVICE (LVAD)  07/13/2022    Procedure: REPLACEMENT, LVAD;  Surgeon: Yg Kaufman MD;  [address];  Service: Cardiovascular;;    REPLACEMENT OF PUMP N/A 07/13/2022    Procedure: REPLACEMENT, PUMP;  Surgeon: Yg Kaufman MD;  [facility];  Service: Cardiovascular;  Laterality: N/A;  LVAD pump exchange  EXPLANATION OF HEATMATE 2  IMPLANTATION OF HEARTMATE 3  IMPLANTATION OF 8MM CHIMNEY GRAFT TO RFA  INITIATION OF ECMO  TEMPORARY CLOSURE OF CHEST    REVISION OF IMPLANTABLE CARDIOVERTER-DEFIBRILLATOR (ICD) ELECTRODE LEAD PLACEMENT N/A 03/09/2020    Procedure: REVISION, INSERTION, ELECTRODE LEAD, ICD;  Surgeon: Harry Yun MD;  Location: [facility] EP LAB;  Service: Cardiology;  Laterality: N/A;  VT, ICD Gen Change and Lead Revision, MDT, MAC, DM,3 Prep    RIGHT HEART CATHETERIZATION Right 09/08/2023    Procedure: INSERTION, CATHETER, RIGHT HEART;  Surgeon: Gaurav Rodriguez MD;  Location: [facility] CATH LAB;  Service: Cardiology;  Laterality: Right;    STERNAL WOUND CLOSURE N/A 07/14/2022    Procedure: CLOSURE, WOUND, STERNUM;  Surgeon: Yg Kaufman MD;  [address];  Service: Cardiovascular;  Laterality: N/A;  temporary closure  evacuation of hematoma    STERNAL WOUND CLOSURE N/A 07/15/2022    Procedure: CLOSURE, WOUND, STERNUM;  Surgeon: Yg Kaufman MD;  [facility];  Service: Cardiovascular;  Laterality: N/A;    TRACHEOSTOMY N/A 08/04/2022    Procedure: CREATION, TRACHEOSTOMY;  Surgeon: Germain Holt MD;  Location: [facility] OR [address] [place] [address];  Service: General;  Laterality: N/A;    TREATMENT OF CARDIAC ARRHYTHMIA  10/18/2019    Procedure:  Cardioversion or Defibrillation;  Surgeon: Raz Wagner MD;  Location: [facility] EP LAB;  Service: Cardiology;;       Review of patient's allergies indicates:   Allergen Reactions    Lisinopril Anaphylaxis    Hydralazine     Hydralazine analogues      Chronic constipation, impotence, dizziness       No current facility-administered medications on file prior to encounter.     Current Outpatient Medications on File Prior to Encounter   Medication Sig    ALPRAZolam (XANAX) 1 MG tablet Take 1 mg by mouth 2 (two) times daily as needed.    amiodarone (PACERONE) 400 MG tablet Take 1 tablet (400 mg total) by mouth 2 (two) times daily. Decrease to 400mg orally daily on May 7th    amLODIPine (NORVASC) 10 MG tablet Take 1 tablet (10 mg total) by mouth once daily.    busPIRone (BUSPAR) 5 MG Tab Take 1 tablet (5 mg total) by mouth 2 (two) times daily.    cefadroxil (DURICEF) 500 MG Cap Take 1 capsule (500 mg total) by mouth every 12 (twelve) hours.    cyanocobalamin 1,000 mcg/mL injection INJECT 1 ML (1,000 MCG TOTAL) INTO THE SKIN ONCE A WEEK. FOR 24 DOSES    ferrous gluconate 324 mg (37.5 mg iron) Tab tablet TAKE 1 TABLET BY MOUTH 2 TIMES DAILY WITH MEALS.    levothyroxine (SYNTHROID) 125 MCG tablet Take 1 tablet (125 mcg total) by mouth before breakfast.    magnesium oxide (MAG-OX) 400 mg (241.3 mg magnesium) tablet Take 1 tablet (400 mg total) by mouth once daily.    mexiletine (MEXITIL) 250 MG Cap Take 1 capsule (250 mg total) by mouth every 8 (eight) hours.    mexiletine (MEXITIL) 250 MG Cap     mirtazapine (REMERON) 45 MG tablet Take 1 tablet (45 mg total) by mouth every evening.    omega-3 acid ethyl esters (LOVAZA) 1 gram capsule Take 2 capsules (2 g total) by mouth 2 (two) times daily.    pantoprazole (PROTONIX) 40 MG tablet Take 1 tablet (40 mg total) by mouth daily with lunch.    potassium chloride SA (K-DUR,KLOR-CON) 20 MEQ tablet Take orally daily when taking torsemide    torsemide (DEMADEX) 20 MG Tab Take 1  tablets (20mg) on .    warfarin (COUMADIN) 5 MG tablet Take 1 tablet (5 mg total) by mouth Daily.    zolpidem (AMBIEN CR) 12.5 MG CR tablet TAKE 1 TABLET (12.5 MG TOTAL) BY MOUTH NIGHTLY AS NEEDED FOR INSOMNIA.    [DISCONTINUED] sildenafiL (VIAGRA) 100 MG tablet Take 1 tablet (100 mg total) by mouth daily as needed for Erectile Dysfunction.     Family History       Problem Relation (Age of Onset)    Cancer Sister (54)    Coronary artery disease Father    Diabetes Father    Heart disease Father    Hypertension Father    No Known Problems Mother, Brother          Tobacco Use    Smoking status: Former     Current packs/day: 0.00     Types: Vaping w/o nicotine, Cigarettes     Start date: 2018     Quit date: 2018     Years since quittin.5     Passive exposure: Never    Smokeless tobacco: Never    Tobacco comments:     pt is quiting on his own - pt stated not qualified for program;  pt  quit on his own   Substance and Sexual Activity    Alcohol use: Never     Comment: quit    Drug use: Never    Sexual activity: Not Currently     Partners: Female     Birth control/protection: None     Comment: 10/5/17  with same partner 7 years      Review of Systems   Constitutional: Negative for chills and fever.   Cardiovascular:  Positive for dyspnea on exertion, leg swelling and palpitations. Negative for chest pain, near-syncope, orthopnea and syncope.   Respiratory:  Positive for shortness of breath.    Gastrointestinal:  Positive for bloating. Negative for abdominal pain, nausea and vomiting.   Neurological:  Positive for light-headedness.     Objective:     Vital Signs (Most Recent):  Temp: 98.6 °F (37 °C) (25)  Pulse: 68 (25)  Resp: 19 (25)  BP: (!) 90/0 (25)  SpO2: 95 % (25) Vital Signs (24h Range):  Temp:  [98.6 °F (37 °C)] 98.6 °F (37 °C)  Pulse:  [0-127] 68  Resp:  [18-22] 19  SpO2:  [82 %-98 %] 95 %  BP: (88-93)/(0) 90/0          Body mass  index is 31.99 kg/m².    SpO2: 95 %        Physical Exam  Constitutional:       Appearance: Normal appearance.   HENT:      Mouth/Throat:      Mouth: Mucous membranes are moist.   Eyes:      Extraocular Movements: Extraocular movements intact.      Conjunctiva/sclera: Conjunctivae normal.   Cardiovascular:      Rate and Rhythm: Normal rate and regular rhythm.      Comments: JVD to tragus  Pulmonary:      Effort: Pulmonary effort is normal.   Abdominal:      General: Abdomen is flat.      Palpations: Abdomen is soft.   Skin:     General: Skin is warm and dry.   Neurological:      General: No focal deficit present.      Mental Status: He is alert and oriented to person, place, and time.            Significant Labs: All pertinent lab results from the last 24 hours have been reviewed.    Significant Imaging: X-Ray: CXR: X-Ray Chest 1 View (CXR): No results found for this visit on 07/14/25.

## 2025-07-15 NOTE — PROGRESS NOTES
Admit Note     SW met with patient to assess needs. Patient is a 59 y.o.  male, admitted for LFAs, V-fib, hx of CKD, WAGNER. Pt received (HM2)  LVAD in 3/2018 and (HM3) pump exchange in 7/2022. Pt does his own dressing changes.    Patient admitted on 7/14/2025. At this time, patient presents as alert and oriented x 4, communicative, and cooperative. At this time, patients caregiver is not present.     Household/Family Systems     Patient resides with his spouse (Kelly Richards).    Pt's home address: [place] [address]    Support system includes spouse.   Pt has 3 adults children that resides out-of-state.   Patient does not have dependents that are need of being cared for.    Pt's phone number: [phone]     Patients primary caregiver is self with support of his spouse. Pt's spouse works full-time.      Emergency Contact:  Kelly Richards, spouse, [phone]    During admission, patient's caregiver plans to stay at home. Confirmed patient and patients caregivers do have access to reliable transportation.    Cognitive Status/Learning     Patient reports reading ability as college and states he does not have difficulty with reading, writing, hearing, and learning. Pt reports he does have difficulty with memory. Pt states that he wears eyeglasses.  Patient reports he learns best by reading.   Needed: No.   Highest education level: Attended College/Technical School    Vocation/Disability     Working for Income: No  If no, reason not working: Disability  Patient is disabled due to HF since 2020.  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    Adherence     Patient reports a medium level of adherence to his health care regimen. Pt reported taking his meds as prescribed. Adherence counseling and education provided. Patient verbalizes understanding.      Substance Use    Patient reports the  following substance usage.    Tobacco: Pt denies tobacco use at present. Pt states that he quit smoking cigarettes over 6 years ago.  Alcohol:  Pt reports hx of heavy alcohol use.  Pt reported last use was 1 week ago.  Pt stated that he drank a 6 pack of beer watching sports.   Illicit Drugs/Non-prescribed Medications: none, patient denies any use.  Patient states clear understanding of the potential impact of substance use.  Substance abstinence/cessation counseling, education and resources provided and reviewed.     Services Utilizing/ADLS    Infusion Service: Prior to admission, patient utilizing? no [facility] ([phone], [phone]) in the past   Home Health: Prior to admission, patient utilizing? no [facility] ([phone]) in the past.    DME: Prior to admission, yes Pt has a RW, cane, and SC at home.  Pulmonary/Cardiac Rehab: Prior to admission, no  Dialysis:  Prior to admission, no  Transplant Specialty Pharmacy:  Prior to admission, no.    Prior to admission, patient reports he was independent with ADLS and was driving. Pt reported he ambulates without assistance.  Patient reports he is able to care for self at this time. Patient indicates a willingness to care for self once medically cleared to do so.    Insurance/Medications    Insured by   Payer/Plan Subscr  Sex Relation Sub. Ins. ID Effective Group Num   1. OHP MEDICARE * JESUS PHELPS* 1965 Male Self [id number]                                   [address]      Primary Insurance (for UNOS reporting): Public Insurance - Medicare & Choice  Secondary Insurance (for UNOS reporting): None    Patient reports he is able to obtain and afford medications at this time and at time of discharge.    Living Will/Healthcare Power of     Pt states that his spouse is his HCPA. Copy of HCPA on file in Epic.    Coping/Mental Health    Patient is coping adequately with the aid of  family members.  Patient indicates mental health difficulties.  Pt reports h/o anxiety. Pt reported no issues with anxiety at this time. Noted pt takes buspar and Xanax prn per medical record.  Pt reported that he is sleeping well at home.  Pt denies SI/HI now or in the past.    Pt was given list of resources last admit for counseling due to anxiety and etoh use.  SW inquired about appt, and pt reported he made appt for next month.  When asked where, pt stated he was tired and did not want to talk anymore.    Discharge Planning    At time of discharge, patient plans to return to his home under the care of self and his spouse. Patients spouse will transport patient. Per rounds today, expected discharge date has not been medically determined at this time. Patient verbalizes understanding and are involved in treatment planning and discharge process.    Additional Concerns    Patient is being followed for needs, education, resources, information, emotional support, supportive counseling, and for supportive and skilled discharge plan of care.  providing ongoing psychosocial support, education, resources and d/c planning as needed.  SW remains available. Patient denies additional needs and/or concerns at this time. Patient verbalizes understanding and agreement with information reviewed, social work availability, and how to access available resources as needed.

## 2025-07-15 NOTE — ASSESSMENT & PLAN NOTE
Procedure: Device Interrogation Including analysis of device parameters  Current Settings: Ventricular Assist Device  Review of device function is stable      7/15/2025    10:00 AM 7/15/2025     9:00 AM 7/15/2025     8:00 AM 7/15/2025     7:00 AM 7/15/2025     6:00 AM 7/15/2025     5:00 AM 7/15/2025     4:00 AM   TXP LVAD INTERROGATIONS   Type HeartMate3 HeartMate3 HeartMate3 HeartMate3 HeartMate3 HeartMate3 HeartMate3   Flow 5.9 5.8 5.8 5.8 5.8 5.8 6   Speed 6300 6300 6300 6300 6300 6300 6300   PI 1.8 1.6 1.5 1.6 1.6 1.4 1.4   Power (Jackson) 5.5 5.5 5.5 5.5 5.8 5.4 5.6   LSL 5900 5900 5900 5900 5900 5900 5900   Pulsatility Intermittent pulse Intermittent pulse Intermittent pulse Intermittent pulse Intermittent pulse Intermittent pulse Intermittent pulse

## 2025-07-15 NOTE — PLAN OF CARE
Problem: Ventricular Assist Device  Goal: Optimal Adjustment to Device  Outcome: Progressing  Goal: Absence of Bleeding  Outcome: Progressing  Goal: Absence of Embolism Signs and Symptoms  Outcome: Progressing  Goal: Optimal Blood Flow  Outcome: Progressing  Goal: Absence of Infection Signs and Symptoms  Outcome: Progressing  Goal: Effective Right-Sided Heart Function  Outcome: Progressing     Problem: Acute Kidney Injury/Impairment  Goal: Fluid and Electrolyte Balance  Outcome: Progressing  Goal: Improved Oral Intake  Outcome: Progressing  Goal: Effective Renal Function  Outcome: Progressing

## 2025-07-15 NOTE — ED TRIAGE NOTES
"Pt arrives to ED with complaints of Dizziness . Pt states he has been feeling faint for the passed two days . States he had a fall two days ago . Denies Hitting head, LOC, and Endorses blood thinners. He is an heart mate 3 LVAD patient.  Endorses SOB , weakness , urinary retention with "Ice tear" color when he does urinate. Also states two days ago his monitor had a Low Flow alarm   "

## 2025-07-15 NOTE — CARE UPDATE
Care Update Note:    Hemodynamics@8pm:  CVP: 2  SVO2: 63  CO: 6.32  CI: 2.56  SVR: 1088    Continuous Infusions:   amiodarone in dextrose 5%  1 mg/min Intravenous Continuous 33.3 mL/hr at 07/14/25 2323 1 mg/min at 07/14/25 2323    amiodarone in dextrose 5%  0.5 mg/min Intravenous Continuous           Intake/Output Summary (Last 24 hours) at 7/15/2025 0121  Last data filed at 7/14/2025 2323  Gross per 24 hour   Intake 87.13 ml   Output --   Net 87.13 ml         Plan:  - UOP 700cc s/p IVP Lasix 80mg and Lasix gtt at 10 started 2hrs ago  - Lasix gtt discontinued     Discussed with attending.    BRYSON Us  Cardiovascular Disease fellow, PGY-5  [facility]

## 2025-07-15 NOTE — ED PROVIDER NOTES
Emergency Department Provider Note    Tim Richards   59 y.o. male   [id number]      7/14/2025       History     This history was obtained from the patient and his wife who was at the bedside.  He presented by ambulance.      He is a 59-year-old with the below past medical history.  He is an LVAD patient.  He complains of constant lightheadedness and dizziness with waxing and waning intensity for three days.  This resulted in a fall onto his right side.  He complains of pain to his right shoulder and right hip as a result of that fall.  He is right-handed.  He complains of pressure-like discomfort to his anterior neck for two days.  He complains of palpitations as well.  He complains of shortness of breath at rest.  He is compliant with all prescribed medications.  He denies fever, chills, headache, chest pain, abdominal pain, nausea, and vomiting.  His LVAD gave a low-flow alert yesterday.         Past Medical History:   Diagnosis Date    A-fib     Anticoagulant long-term use     Atrial flutter 7/30/2022    CHF (congestive heart failure)     Class 1 obesity due to excess calories with serious comorbidity and body mass index (BMI) of 31.0 to 31.9 in adult     Class 1 obesity due to excess calories with serious comorbidity and body mass index (BMI) of 32.0 to 32.9 in adult     Dilated cardiomyopathy 1/10/2018    Disorder of kidney and ureter     CKD    Encounter for blood transfusion     Essential hypertension 8/28/2022    Gout     HTN (hypertension)     Hx of psychiatric care     ICD (implantable cardioverter-defibrillator) infection 7/1/2020    Psychiatric problem     Thyroid disease     Ventricular tachycardia (paroxysmal)       Past Surgical History:   Procedure Laterality Date    AORTIC VALVULOPLASTY N/A 07/13/2022    Procedure: REPAIR, AORTIC VALVE;  Surgeon: Yg Kaufman MD;  Location: [facility] OR [address] [place] [address];  Service: Cardiovascular;  Laterality: N/A;    APPLICATION OF WOUND VACUUM-ASSISTED CLOSURE DEVICE N/A  07/15/2022    Procedure: APPLICATION, WOUND VAC;  Surgeon: Yg Kaufman MD;  Location: [facility] OR 2ND FLR;  Service: Cardiovascular;  Laterality: N/A;  50 x 5 cm     APPLICATION OF WOUND VACUUM-ASSISTED CLOSURE DEVICE Right 09/27/2022    Procedure: APPLICATION, WOUND VAC;  Surgeon: Kole Tabares MD;  Location: [facility] OR 2ND FLR;  Service: Plastics;  Laterality: Right;    CARDIAC CATHETERIZATION  12/2012    CARDIAC DEFIBRILLATOR PLACEMENT Left     CRRT-D    CARDIAC VALVE REPLACEMENT  03/08/2018    LVAD Implant    COLONOSCOPY N/A 03/06/2018    Procedure: COLONOSCOPY;  Surgeon: Alonso Bone MD;  Location: [facility] ENDO (2ND FLR);  Service: Endoscopy;  Laterality: N/A;    COLONOSCOPY N/A 07/17/2019    Procedure: COLONOSCOPY;  Surgeon: Blane Valdez MD;  Location: [facility] ENDO (2ND FLR);  Service: Endoscopy;  Laterality: N/A;    COLONOSCOPY N/A 07/18/2019    Procedure: COLONOSCOPY;  Surgeon: Blane Valdez MD;  Location: [facility] ENDO (2ND FLR);  Service: Endoscopy;  Laterality: N/A;    CREATION OF ILIOFEMORAL ARTERY BYPASS Right 09/27/2022    Procedure: CREATION, BYPASS, ARTERIAL, ILIAC TO FEMORAL WITH GRAFT;  Surgeon: Zach Hernández MD;  Location: [facility] OR 2ND FLR;  Service: Peripheral Vascular;  Laterality: Right;    CREATION OF MUSCLE ROTATIONAL FLAP Right 09/27/2022    Procedure: CREATION, FLAP, MUSCLE ROTATION, SARTORIUS AND RECTUS FEMORIS;  Surgeon: Kole Tabares MD;  Location: [facility] OR 2ND FLR;  Service: Plastics;  Laterality: Right;    ESOPHAGOGASTRODUODENOSCOPY N/A 07/17/2019    Procedure: EGD (ESOPHAGOGASTRODUODENOSCOPY);  Surgeon: Blane Valdez MD;  Location: [facility] ENDO (2ND FLR);  Service: Endoscopy;  Laterality: N/A;    ESOPHAGOGASTRODUODENOSCOPY N/A 07/18/2019    Procedure: EGD (ESOPHAGOGASTRODUODENOSCOPY);  Surgeon: Blane Valdez MD;  Location: [facility] ENDO (2ND FLR);  Service: Endoscopy;  Laterality: N/A;    FOREIGN BODY REMOVAL N/A 07/22/2022    Procedure: REMOVAL, FOREIGN BODY;  Surgeon: Yg Kaufman MD;   Location: [facility] OR [facility] FLR;  Service: Cardiovascular;  Laterality: N/A;  LVAD Heartmate 2 drive line removal    INSERTION OF GRAFT TO PERICARDIUM N/A 07/15/2022    Procedure: INSERTION, GRAFT, PERICARDIUM;  Surgeon: Yg Kaufman MD;  Location: [facility] OR 2ND FLR;  Service: Cardiovascular;  Laterality: N/A;    IRRIGATION OF MEDIASTINUM N/A 07/15/2022    Procedure: IRRIGATION, MEDIASTINUM;  Surgeon: Yg Kaufman MD;  Location: [facility] OR [facility] FLR;  Service: Cardiovascular;  Laterality: N/A;    LYSIS OF ADHESIONS  07/13/2022    Procedure: LYSIS, ADHESIONS;  Surgeon: Yg Kaufman MD;  [facility];  Service: Cardiovascular;;    NONINVASIVE CARDIAC ELECTROPHYSIOLOGY STUDY N/A 10/18/2019    Procedure: CARDIAC ELECTROPHYSIOLOGY STUDY, NONINVASIVE;  Surgeon: Raz Wagner MD;  Location: [facility] EP LAB;  Service: Cardiology;  Laterality: N/A;  VT, DFTs, MDT CRTD in situ, LVAD, juarez MB, 3098    REPLACEMENT OF IMPLANTABLE CARDIOVERTER-DEFIBRILLATOR (ICD) GENERATOR N/A 03/09/2020    Procedure: REPLACEMENT, ICD GENERATOR;  Surgeon: Harry Yun MD;  Location: [facility] EP LAB;  Service: Cardiology;  Laterality: N/A;  VT, ICD Gen Change and Lead Revision, MDT, MAC, DM,3 Prep    REPLACEMENT OF LEFT VENTRICULAR ASSIST DEVICE (LVAD)  07/13/2022    Procedure: REPLACEMENT, LVAD;  Surgeon: Yg Kaufman MD;  [facility];  Service: Cardiovascular;;    REPLACEMENT OF PUMP N/A 07/13/2022    Procedure: REPLACEMENT, PUMP;  Surgeon: Yg Kaufman MD;  [facility];  Service: Cardiovascular;  Laterality: N/A;  LVAD pump exchange  EXPLANATION OF HEATMATE 2  IMPLANTATION OF HEARTMATE 3  IMPLANTATION OF 8MM CHIMNEY GRAFT TO RFA  INITIATION OF ECMO  TEMPORARY CLOSURE OF CHEST    REVISION OF IMPLANTABLE CARDIOVERTER-DEFIBRILLATOR (ICD) ELECTRODE LEAD PLACEMENT N/A 03/09/2020    Procedure: REVISION, INSERTION, ELECTRODE LEAD, ICD;  Surgeon: Harry Yun MD;  Location: [facility] EP LAB;  Service: Cardiology;   Laterality: N/A;  VT, ICD Gen Change and Lead Revision, MDT, MAC, DM,3 Prep    RIGHT HEART CATHETERIZATION Right 2023    Procedure: INSERTION, CATHETER, RIGHT HEART;  Surgeon: Gaurav Rodriguez MD;  Location: [facility] CATH LAB;  Service: Cardiology;  Laterality: Right;    STERNAL WOUND CLOSURE N/A 2022    Procedure: CLOSURE, WOUND, STERNUM;  Surgeon: Yg Kaufman MD;  Location: [facility] OR [facility] FLR;  Service: Cardiovascular;  Laterality: N/A;  temporary closure  evacuation of hematoma    STERNAL WOUND CLOSURE N/A 07/15/2022    Procedure: CLOSURE, WOUND, STERNUM;  Surgeon: Yg Kaufman MD;  [facility];  Service: Cardiovascular;  Laterality: N/A;    TRACHEOSTOMY N/A 2022    Procedure: CREATION, TRACHEOSTOMY;  Surgeon: Germain Holt MD;  [facility];  Service: General;  Laterality: N/A;    TREATMENT OF CARDIAC ARRHYTHMIA  10/18/2019    Procedure: Cardioversion or Defibrillation;  Surgeon: Raz Wagner MD;  Location: [facility] EP LAB;  Service: Cardiology;;      Family History   Problem Relation Name Age of Onset    Hypertension Father Seymour Maurice     Diabetes Father Kolbymiryam Richards     Coronary artery disease Father Kolby Richards     Heart disease Father Seymourmiryam Richards         CHF    No Known Problems Mother      Cancer Sister Kelly Forde 54        breast CA    No Known Problems Brother      Anxiety disorder Neg Hx      Depression Neg Hx      Dementia Neg Hx      Bipolar disorder Neg Hx      Suicide Neg Hx        Social History     Socioeconomic History    Marital status:     Number of children: 3   Occupational History     Employer: [employer]    Tobacco Use    Smoking status: Former     Current packs/day: 0.00     Types: Vaping w/o nicotine, Cigarettes     Start date: 2018     Quit date: 2018     Years since quittin.5     Passive exposure: Never    Smokeless tobacco: Never    Tobacco comments:     pt is quiting on his own - pt stated not qualified  for program; 2/16 pt  quit on his own   Substance and Sexual Activity    Alcohol use: Never     Comment: quit    Drug use: Never    Sexual activity: Not Currently     Partners: Female     Birth control/protection: None     Comment: 10/5/17  with same partner 7 years    Social History Narrative    Disabled     Social Drivers of Health     Financial Resource Strain: Low Risk  (7/15/2025)    Overall Financial Resource Strain (CARDIA)     Difficulty of Paying Living Expenses: Not hard at all   Food Insecurity: No Food Insecurity (7/15/2025)    Hunger Vital Sign     Worried About Running Out of Food in the Last Year: Never true     Ran Out of Food in the Last Year: Never true   Transportation Needs: No Transportation Needs (7/15/2025)    PRAPARE - Transportation     Lack of Transportation (Medical): No     Lack of Transportation (Non-Medical): No   Physical Activity: Unknown (4/20/2025)    Exercise Vital Sign     Days of Exercise per Week: 0 days   Stress: No Stress Concern Present (7/15/2025)    [employer] - Occupational Stress Questionnaire     Feeling of Stress : Not at all   Recent Concern: Stress - Stress Concern Present (4/20/2025)    [employer] - Occupational Stress Questionnaire     Feeling of Stress : To some extent   Housing Stability: Low Risk  (7/15/2025)    Housing Stability Vital Sign     Unable to Pay for Housing in the Last Year: No     Number of Times Moved in the Last Year: 0     Homeless in the Last Year: No      Review of patient's allergies indicates:   Allergen Reactions    Lisinopril Anaphylaxis    Hydralazine     Hydralazine analogues      Chronic constipation, impotence, dizziness           Physical Examination     Initial Vitals   BP Pulse Resp Temp SpO2   07/14/25 2015 07/14/25 2200 07/14/25 2003 07/14/25 2003 07/14/25 2100   (!) 93/0 (!) 127 (!) 22 98.6 °F (37 °C) 95 %      MAP       --                  Physical Exam    Nursing  note and vitals reviewed.  Constitutional: He is not diaphoretic. No distress.   HENT:   Head: Normocephalic and atraumatic. Mouth/Throat: Oropharynx is clear and moist.   Eyes: Conjunctivae are normal. No scleral icterus.   Cardiovascular:            Typical mechanical LVAD hum.   Pulmonary/Chest: No respiratory distress. He has no wheezes. He has no rhonchi.   Abdominal: Abdomen is soft. He exhibits no distension. There is no abdominal tenderness.   Musculoskeletal:         General: No edema.      Right shoulder: No swelling, deformity or tenderness. Normal range of motion. Normal strength.      Right hip: Tenderness present. No deformity. Normal range of motion.      Comments: No right hip pain with passive ranging.  Full passive range of motion at the joint.     Neurological: He is alert and oriented to person, place, and time. GCS score is 15. GCS eye subscore is 4. GCS verbal subscore is 5. GCS motor subscore is 6.   Skin: Skin is warm and dry. No pallor.   Psychiatric: He has a normal mood and affect. His speech is normal and behavior is normal. He is not actively hallucinating. He is attentive.            Labs     Labs Reviewed   COMPREHENSIVE METABOLIC PANEL - Abnormal       Result Value    Sodium 134 (*)     Potassium 4.8      Chloride 98      CO2 23      Glucose 115 (*)     BUN 61 (*)     Creatinine 4.3 (*)     Calcium 8.9      Protein Total 8.2      Albumin 4.0      Bilirubin Total 0.5      ALP 93       (*)      (*)     Anion Gap 13      eGFR 15 (*)    PROTIME-INR - Abnormal    PT 31.2 (*)     INR 3.1 (*)    CBC WITH DIFFERENTIAL - Abnormal    WBC 12.33      RBC 4.97      HGB 14.2      HCT 44.0      MCV 89      MCH 28.6      MCHC 32.3      RDW 16.5 (*)     Platelet Count 246      MPV 10.9      Nucleated RBC 0      Neut % 79.3 (*)     Lymph % 8.8 (*)     Mono % 11.3      Eos % 0.0      Basophil % 0.1      Imm Grans % 0.5      Neut # 9.78 (*)     Lymph # 1.09      Mono # 1.39 (*)     Eos #  0.00      Baso # 0.01      Imm Grans # 0.06 (*)    TROPONIN I HIGH SENSITIVITY - Abnormal    Troponin High Sensitive 1,172 (*)    CBC W/ AUTO DIFFERENTIAL    Narrative:     The following orders were created for panel order CBC auto differential.  Procedure                               Abnormality         Status                     ---------                               -----------         ------                     CBC with Differential[[id number]]       Abnormal            Final result                 Please view results for these tests on the individual orders.   ISTAT LACTATE    POC Lactate 1.89      Sample VENOUS      Site Other      Allens Test N/A          Imaging     Imaging Results              X-Ray Chest AP Portable (Final result)  Result time 07/15/25 06:29:57      Final result by Casper Randle MD (07/15/25 06:29:57)                   Impression:      Continued demonstration of cardiomegaly, but allowing for differences in projection, lordotic positioning, and a poorer inspiratory depth level on the current examination there has been no significant detrimental interval change in the appearance of the chest since 11/06/2024.      Electronically signed by: Casper Randle MD  Date:    07/15/2025  Time:    06:29               Narrative:    EXAMINATION:  XR CHEST AP PORTABLE    CLINICAL HISTORY:  ADHF;    TECHNIQUE:  One view    COMPARISON:  Comparison is made to 11/06/2024.    FINDINGS:  Postoperative changes are again noted in the thorax, as are a left ventricular assist device and a subcutaneous implantable cardioverter/defibrillator.  The heart is enlarged, but the degree of cardiomegaly and the appearance of the cardiomediastinal silhouette have not changed appreciably since the examination referenced above.  Pulmonary vascularity is normal, and there are no findings indicating current cardiac decompensation.  Lung zones are clear, and are free of significant airspace consolidation or volume loss.  No  pleural fluid of any substantial volume is seen on either side.  No pneumothorax.                                       X-Ray Hip 2 or 3 views Right with Pelvis when performed (Final result)  Result time 07/15/25 00:05:59      Final result by Evelio Fenton DO (07/15/25 00:05:59)                   Impression:      No acute osseous abnormality.      Electronically signed by: Evelio Fenton  Date:    07/15/2025  Time:    00:05               Narrative:    EXAMINATION:  XR HIP WITH PELVIS WHEN PERFORMED 2 OR 3 VIEWS RIGHT    CLINICAL HISTORY:  Acute right hip pain;    TECHNIQUE:  AP view of the pelvis and frog leg lateral view of the right hip were performed.    COMPARISON:  None    FINDINGS:  There is no acute fracture or dislocation of the pelvis or the right hip.  Alignment is normal.  The femoral heads are well seated in the acetabula.  There are surgical clips overlying the right hip.                                        ED Course     The patient received the following medications:  Medications   amiodarone 360 mg/200 mL (1.8 mg/mL) infusion (0 mg/min Intravenous Stopped 7/15/25 0454)   amiodarone 360 mg/200 mL (1.8 mg/mL) infusion (0 mg/min Intravenous Stopped 7/16/25 0447)   acetaminophen tablet 650 mg (650 mg Oral Given 7/15/25 0614)   melatonin tablet 6 mg (has no administration in time range)   simethicone chewable tablet 80 mg (has no administration in time range)   senna tablet 8.6 mg (has no administration in time range)   polyethylene glycol packet 17 g (has no administration in time range)   amLODIPine tablet 10 mg (10 mg Oral Given 7/16/25 0859)   busPIRone tablet 5 mg (5 mg Oral Given 7/16/25 0859)   levothyroxine tablet 125 mcg (125 mcg Oral Given 7/16/25 0501)   mexiletine capsule 250 mg (250 mg Oral Given 7/16/25 0501)   mirtazapine tablet 45 mg (45 mg Oral Given 7/15/25 2050)   pantoprazole EC tablet 40 mg (40 mg Oral Given 7/16/25 1239)   magnesium oxide tablet 400 mg (400 mg Oral Given  7/16/25 0859)   ALPRAZolam tablet 1 mg (1 mg Oral Given 7/15/25 2049)   cefadroxil capsule 500 mg (500 mg Oral Given 7/16/25 0859)   amiodarone tablet 400 mg (400 mg Oral Given 7/16/25 0859)   warfarin (COUMADIN) tablet 5 mg (has no administration in time range)   acetaminophen tablet 650 mg (650 mg Oral Given 7/14/25 2239)   fentaNYL 50 mcg/mL injection 50 mcg (50 mcg Intravenous Given 7/14/25 2206)   midazolam injection 2 mg (2 mg Intravenous Given 7/14/25 2206)   amiodarone in dextrose 150 mg/100 mL (1.5 mg/mL) loading dose 150 mg (0 mg Intravenous Stopped 7/14/25 2317)   furosemide injection 80 mg (80 mg Intravenous Given 7/14/25 2301)   LIDOcaine HCL 10 mg/ml (1%) 10 mg/mL (1 %) injection (200 mg Infiltration Given 7/15/25 0036)   LORazepam injection 1 mg (1 mg Intravenous Given 7/15/25 0215)   potassium chloride CR capsule 30 mEq (30 mEq Oral Given 7/15/25 0601)   potassium chloride SA CR tablet 40 mEq (40 mEq Oral Given 7/15/25 0900)           ED Course as of 07/16/25 1447   Mon Jul 14, 2025 2219 I just spoke with cardiology fellow, Dr. Huddleston, at the bedside. He performed fibrillation for ventricular fibrillation and will admit the patient to the CCU. I was at the bedside during the defibrillation. [LP]      ED Course User Index  [LP] Hubert Diez III, MD        Medical Decision Making                 Medical Decision Making  Differential diagnoses included LVAD dysfunction, acute anemia, dehydration, renal failure, dysrhythmia.  Labs showed WAGNER on CKD.  Patient found to have ventricular fibrillation.  Defibrillation performed.  I was at the bedside for supervision for the duration of the procedure.  The patient was admitted to the CCU for further evaluation and management.    Amount and/or Complexity of Data Reviewed  Labs: ordered.  Radiology: ordered.    Risk  OTC drugs.  Decision regarding hospitalization.      Critical Care   Date: 07/16/2025  Performed by: Hubert Diez III, MD   Authorized by: Hubert  ESTEPHANIE Diez III, MD    Total critical care time (exclusive of procedural time) :  30 minutes  Critical care was necessary to treat or prevent imminent or life-threatening deterioration of the following conditions:  Ventricular fibrillation        Diagnoses       ICD-10-CM ICD-9-CM   1. Ventricular fibrillation  I49.01 427.41   2. Dizziness  R42 780.4   3. Acute kidney injury  N17.9 584.9         Dispostion      ED Disposition Condition    Admit              Hubert Diez III, MD  07/16/25 9527

## 2025-07-15 NOTE — ASSESSMENT & PLAN NOTE
Patient's blood pressure range in the last 24 hours was: BP  Min: 88/0  Max: 93/0.The patient's inpatient anti-hypertensive regimen is listed below:  Current Antihypertensives  amLODIPine tablet 10 mg, Daily, Oral    Plan  - BP is controlled, no changes needed to their regimen

## 2025-07-15 NOTE — ASSESSMENT & PLAN NOTE
Patient presented with lightheadedness, dizziness had low-flow alarms.    -started on amiodarone drip, we will continue with same, EP consulted, we will keep on drip for 24 hours and switch to home p.o. dose of amiodarone.  -continue with mexiletine.  -magnesium 2.9, potassium 3.5, 40 mEq of potassium.  -device interrogation.

## 2025-07-15 NOTE — ASSESSMENT & PLAN NOTE
Cr 4.3 on admission, baseline around 2.0. Volume overloaded on exam, suspect cardiorenal.   - Diuresis, as above  - Renally dose medications, avoid nephrotoxic agents

## 2025-07-15 NOTE — PT/OT/SLP PROGRESS
"Occupational Therapy      Patient Name:  Tim Richards   MRN:  [id number]    Patient not seen today secondary to pt deferring OT sessions at 2 attempt made. At attempt at 1132 unwilling to participate 2/2 c/o not feeling well and upon second attempt at 1309 pt deferring OT session 2/2 "just eating lunch." OT providing education on OT roles and purpose of session.  Will follow-up as appropriate for initial OT evaluation.    7/15/2025  "

## 2025-07-15 NOTE — PLAN OF CARE
Recommendations     Interventions: Fat modified diet, Cholesterol modified diet, Sodium modified diet, Content related nutrition education, Collaboration and referral of nutrition care     Recommendations:   1. Continue cardiac diet as tolerated     - please continue to document PO % intake via flowsheets       2. Encourage good intake    3. RD to monitor weight, labs, meds, intake, tolerance     Goal #1: PO intake will meet greater than or equal to 75% of estimated needs by RD follow up  Nutrition Goal Status #1: new  Goal #2: Maintain weight throughout hospitalization  Nutrition Goal Status #2: new  Communication of RD Recs:  (POC)     Nutrition Discharge Planning      Nutrition Discharge Planning: Therapeutic diet (comments)  Therapeutic diet (comments): cardiac diet

## 2025-07-16 VITALS
SYSTOLIC BLOOD PRESSURE: 88 MMHG | HEART RATE: 63 BPM | RESPIRATION RATE: 25 BRPM | WEIGHT: 232.06 LBS | HEIGHT: 73 IN | OXYGEN SATURATION: 96 % | TEMPERATURE: 98 F | BODY MASS INDEX: 30.76 KG/M2

## 2025-07-16 LAB
ABSOLUTE EOSINOPHIL (OHS): 0.02 K/UL
ABSOLUTE MONOCYTE (OHS): 0.85 K/UL (ref 0.3–1)
ABSOLUTE NEUTROPHIL COUNT (OHS): 4.36 K/UL (ref 1.8–7.7)
ALBUMIN SERPL BCP-MCNC: 3.7 G/DL (ref 3.5–5.2)
ALBUMIN SERPL BCP-MCNC: 3.7 G/DL (ref 3.5–5.2)
ALLENS TEST: ABNORMAL
ALLENS TEST: ABNORMAL
ALP SERPL-CCNC: 98 UNIT/L (ref 40–150)
ALT SERPL W/O P-5'-P-CCNC: 185 UNIT/L (ref 10–44)
ANION GAP (OHS): 11 MMOL/L (ref 8–16)
AST SERPL-CCNC: 133 UNIT/L (ref 11–45)
BASOPHILS # BLD AUTO: 0.03 K/UL
BASOPHILS NFR BLD AUTO: 0.5 %
BILIRUB DIRECT SERPL-MCNC: 0.2 MG/DL (ref 0.1–0.3)
BILIRUB SERPL-MCNC: 0.5 MG/DL (ref 0.1–1)
BNP SERPL-MCNC: 184 PG/ML (ref 0–99)
BUN SERPL-MCNC: 44 MG/DL (ref 6–20)
CALCIUM SERPL-MCNC: 9 MG/DL (ref 8.7–10.5)
CHLORIDE SERPL-SCNC: 100 MMOL/L (ref 95–110)
CO2 SERPL-SCNC: 26 MMOL/L (ref 23–29)
CREAT SERPL-MCNC: 2.8 MG/DL (ref 0.5–1.4)
ERYTHROCYTE [DISTWIDTH] IN BLOOD BY AUTOMATED COUNT: 15.7 % (ref 11.5–14.5)
GFR SERPLBLD CREATININE-BSD FMLA CKD-EPI: 25 ML/MIN/1.73/M2
GLUCOSE SERPL-MCNC: 97 MG/DL (ref 70–110)
HCO3 UR-SCNC: 30.9 MMOL/L (ref 24–28)
HCO3 UR-SCNC: 32.6 MMOL/L (ref 24–28)
HCT VFR BLD AUTO: 40.8 % (ref 40–54)
HCT VFR BLD CALC: 41 %PCV (ref 36–54)
HCT VFR BLD CALC: 42 %PCV (ref 36–54)
HGB BLD-MCNC: 12.9 GM/DL (ref 14–18)
IMM GRANULOCYTES # BLD AUTO: 0.02 K/UL (ref 0–0.04)
IMM GRANULOCYTES NFR BLD AUTO: 0.3 % (ref 0–0.5)
INR PPP: 2.8 (ref 0.8–1.2)
LDH SERPL-CCNC: 249 U/L (ref 110–260)
LYMPHOCYTES # BLD AUTO: 1.16 K/UL (ref 1–4.8)
MAGNESIUM SERPL-MCNC: 2.5 MG/DL (ref 1.6–2.6)
MCH RBC QN AUTO: 28.7 PG (ref 27–31)
MCHC RBC AUTO-ENTMCNC: 31.6 G/DL (ref 32–36)
MCV RBC AUTO: 91 FL (ref 82–98)
NUCLEATED RBC (/100WBC) (OHS): 0 /100 WBC
PCO2 BLDA: 45.9 MMHG (ref 35–45)
PCO2 BLDA: 51 MMHG (ref 35–45)
PH SMN: 7.42 [PH] (ref 7.35–7.45)
PH SMN: 7.44 [PH] (ref 7.35–7.45)
PHOSPHATE SERPL-MCNC: 2.4 MG/DL (ref 2.7–4.5)
PLATELET # BLD AUTO: 212 K/UL (ref 150–450)
PMV BLD AUTO: 10.6 FL (ref 9.2–12.9)
PO2 BLDA: 28 MMHG (ref 40–60)
PO2 BLDA: 69 MMHG (ref 80–100)
POC BE: 7 MMOL/L (ref -2–2)
POC BE: 8 MMOL/L (ref -2–2)
POC IONIZED CALCIUM: 1.19 MMOL/L (ref 1.06–1.42)
POC IONIZED CALCIUM: 1.2 MMOL/L (ref 1.06–1.42)
POC SATURATED O2: 52 % (ref 95–100)
POC SATURATED O2: 94 % (ref 95–100)
POC TCO2: 32 MMOL/L (ref 23–27)
POC TCO2: 34 MMOL/L (ref 24–29)
POTASSIUM BLD-SCNC: 3.7 MMOL/L (ref 3.5–5.1)
POTASSIUM BLD-SCNC: 3.8 MMOL/L (ref 3.5–5.1)
POTASSIUM SERPL-SCNC: 4 MMOL/L (ref 3.5–5.1)
PROT SERPL-MCNC: 7.7 GM/DL (ref 6–8.4)
PROTHROMBIN TIME: 28.3 SECONDS (ref 9–12.5)
RBC # BLD AUTO: 4.49 M/UL (ref 4.6–6.2)
RELATIVE EOSINOPHIL (OHS): 0.3 %
RELATIVE LYMPHOCYTE (OHS): 18 % (ref 18–48)
RELATIVE MONOCYTE (OHS): 13.2 % (ref 4–15)
RELATIVE NEUTROPHIL (OHS): 67.7 % (ref 38–73)
SAMPLE: ABNORMAL
SAMPLE: ABNORMAL
SITE: ABNORMAL
SITE: ABNORMAL
SODIUM BLD-SCNC: 138 MMOL/L (ref 136–145)
SODIUM BLD-SCNC: 138 MMOL/L (ref 136–145)
SODIUM SERPL-SCNC: 137 MMOL/L (ref 136–145)
WBC # BLD AUTO: 6.44 K/UL (ref 3.9–12.7)

## 2025-07-16 PROCEDURE — 85610 PROTHROMBIN TIME: CPT | Performed by: STUDENT IN AN ORGANIZED HEALTH CARE EDUCATION/TRAINING PROGRAM

## 2025-07-16 PROCEDURE — 85025 COMPLETE CBC W/AUTO DIFF WBC: CPT | Performed by: STUDENT IN AN ORGANIZED HEALTH CARE EDUCATION/TRAINING PROGRAM

## 2025-07-16 PROCEDURE — 25000003 PHARM REV CODE 250: Performed by: INTERNAL MEDICINE

## 2025-07-16 PROCEDURE — 27000248 HC VAD-ADDITIONAL DAY

## 2025-07-16 PROCEDURE — 82040 ASSAY OF SERUM ALBUMIN: CPT

## 2025-07-16 PROCEDURE — 82803 BLOOD GASES ANY COMBINATION: CPT

## 2025-07-16 PROCEDURE — 36556 INSERT NON-TUNNEL CV CATH: CPT

## 2025-07-16 PROCEDURE — 97530 THERAPEUTIC ACTIVITIES: CPT

## 2025-07-16 PROCEDURE — 25000003 PHARM REV CODE 250

## 2025-07-16 PROCEDURE — 83880 ASSAY OF NATRIURETIC PEPTIDE: CPT | Performed by: STUDENT IN AN ORGANIZED HEALTH CARE EDUCATION/TRAINING PROGRAM

## 2025-07-16 PROCEDURE — 83735 ASSAY OF MAGNESIUM: CPT

## 2025-07-16 PROCEDURE — 84075 ASSAY ALKALINE PHOSPHATASE: CPT

## 2025-07-16 PROCEDURE — 97535 SELF CARE MNGMENT TRAINING: CPT

## 2025-07-16 PROCEDURE — 37799 UNLISTED PX VASCULAR SURGERY: CPT

## 2025-07-16 PROCEDURE — 97161 PT EVAL LOW COMPLEX 20 MIN: CPT

## 2025-07-16 PROCEDURE — 83615 LACTATE (LD) (LDH) ENZYME: CPT | Performed by: STUDENT IN AN ORGANIZED HEALTH CARE EDUCATION/TRAINING PROGRAM

## 2025-07-16 PROCEDURE — 97165 OT EVAL LOW COMPLEX 30 MIN: CPT

## 2025-07-16 PROCEDURE — 94761 N-INVAS EAR/PLS OXIMETRY MLT: CPT | Mod: XB

## 2025-07-16 PROCEDURE — 99900035 HC TECH TIME PER 15 MIN (STAT)

## 2025-07-16 RX ORDER — AMIODARONE HYDROCHLORIDE 400 MG/1
400 TABLET ORAL DAILY
Start: 2025-07-16 | End: 2025-07-23 | Stop reason: SDUPTHER

## 2025-07-16 RX ORDER — WARFARIN SODIUM 5 MG/1
TABLET ORAL
Start: 2025-07-16

## 2025-07-16 RX ORDER — WARFARIN SODIUM 5 MG/1
5 TABLET ORAL DAILY
Status: DISCONTINUED | OUTPATIENT
Start: 2025-07-16 | End: 2025-07-16 | Stop reason: HOSPADM

## 2025-07-16 RX ADMIN — MEXILETINE HYDROCHLORIDE 250 MG: 250 CAPSULE ORAL at 05:07

## 2025-07-16 RX ADMIN — AMIODARONE HYDROCHLORIDE 400 MG: 200 TABLET ORAL at 08:07

## 2025-07-16 RX ADMIN — BUSPIRONE HYDROCHLORIDE 5 MG: 5 TABLET ORAL at 08:07

## 2025-07-16 RX ADMIN — Medication 400 MG: at 08:07

## 2025-07-16 RX ADMIN — LEVOTHYROXINE SODIUM 125 MCG: 0.03 TABLET ORAL at 05:07

## 2025-07-16 RX ADMIN — CEFADROXIL 500 MG: 500 CAPSULE ORAL at 08:07

## 2025-07-16 RX ADMIN — PANTOPRAZOLE SODIUM 40 MG: 40 TABLET, DELAYED RELEASE ORAL at 12:07

## 2025-07-16 RX ADMIN — AMLODIPINE BESYLATE 10 MG: 10 TABLET ORAL at 08:07

## 2025-07-16 NOTE — PROGRESS NOTES
DISCHARGE NOTE:    Tim Richards is a 59 y.o. male s/p Hm3 VAD who was admitted for VT. He underwent DCCV and placed on IV amiodarone briefly. He continues with amio/mexiletine as previously prescribed.     Pharmacy Interventions/Recommendations:  1) INR Goal: 2-3    2) Antiplatelet Agents: none     3) Heparin Bridging:  yes     4) INR Follow-Up/Discharge Needs:  Repeat INR next week. Needs f/u with ID to determine length of therapy for cefadroxil. Currently does not have refills and states that therapy has ended.     See list of discharge medication for dosing instructions.     Tim Richards and his caregiver verbalized their understanding and had the opportunity to ask questions.      Discharge Medications:     Medication List        CHANGE how you take these medications      amiodarone 400 MG tablet  Commonly known as: PACERONE  Take 1 tablet (400 mg total) by mouth once daily.  What changed:   when to take this  additional instructions     mexiletine 250 MG Cap  Commonly known as: MEXITIL  Take 1 capsule (250 mg total) by mouth every 8 (eight) hours.  What changed: Another medication with the same name was removed. Continue taking this medication, and follow the directions you see here.     warfarin 5 MG tablet  Commonly known as: COUMADIN  Take 1 tablet (5mg) daily, except 1.5 tablets (7.5mg) on Tuesdays and Thursdays  What changed:   how much to take  how to take this  when to take this  additional instructions            CONTINUE taking these medications      ALPRAZolam 1 MG tablet  Commonly known as: XANAX     amLODIPine 10 MG tablet  Commonly known as: NORVASC  Take 1 tablet (10 mg total) by mouth once daily.     busPIRone 5 MG Tab  Commonly known as: BUSPAR  Take 1 tablet (5 mg total) by mouth 2 (two) times daily.     cefadroxil 500 MG Cap  Commonly known as: DURICEF  Take 1 capsule (500 mg total) by mouth every 12 (twelve) hours.     ferrous gluconate 324 mg (37.5 mg iron) Tab tablet  TAKE 1  TABLET BY MOUTH 2 TIMES DAILY WITH MEALS.     levothyroxine 125 MCG tablet  Commonly known as: SYNTHROID  Take 1 tablet (125 mcg total) by mouth before breakfast.     magnesium oxide 400 mg (241.3 mg magnesium) tablet  Commonly known as: MAG-OX  Take 1 tablet (400 mg total) by mouth once daily.     mirtazapine 45 MG tablet  Commonly known as: REMERON  Take 1 tablet (45 mg total) by mouth every evening.     omega-3 acid ethyl esters 1 gram capsule  Commonly known as: LOVAZA  Take 2 capsules (2 g total) by mouth 2 (two) times daily.     pantoprazole 40 MG tablet  Commonly known as: PROTONIX  Take 1 tablet (40 mg total) by mouth daily with lunch.     potassium chloride SA 20 MEQ tablet  Commonly known as: K-DUR,KLOR-CON  Take orally daily when taking torsemide     torsemide 20 MG Tab  Commonly known as: DEMADEX  Take 1 tablets (20mg) on Mondays.     zolpidem 12.5 MG CR tablet  Commonly known as: AMBIEN CR  TAKE 1 TABLET (12.5 MG TOTAL) BY MOUTH NIGHTLY AS NEEDED FOR INSOMNIA.            STOP taking these medications      cyanocobalamin 1,000 mcg/mL injection               Where to Get Your Medications        Information about where to get these medications is not yet available    Ask your nurse or doctor about these medications  amiodarone 400 MG tablet  warfarin 5 MG tablet

## 2025-07-16 NOTE — PT/OT/SLP EVAL
Occupational Therapy  Co Evaluation/treatment and Discharge Note    Name: Tim Richards  MRN: [id number]  Admitting Diagnosis: Ventricular fibrillation  Recent Surgery: * No surgery found *      Recommendations:     Discharge Recommendations: No Therapy Indicated  Discharge Equipment Recommendations: none  Barriers to discharge:  None    Assessment:     Tim Richards is a 59 y.o. male with a medical diagnosis of Ventricular fibrillation. Pt with VSS t/o session. At this time, patient is functioning at their prior level of function and does not require further acute OT services.     Co-treatment with PT performed for patient safety, education, and facilitation of treatment to maximize activity tolerance and progression towards goals from two skilled therapy disciplines.     Plan:     During this hospitalization, patient does not require further acute OT services.  Please re-consult if situation changes.    Plan of Care Reviewed with: patient    Subjective     Chief Complaint: none  Patient/Family Comments/goals: return home    Occupational Profile:  Living Environment: pt lives with spouse in 2 SH, 5 SALVADOR with B HR, 12 steps to 2nd level with L HR, bedroom and bathroom available if needed on 1st floor, t/s and WIS with shower chair  Previous level of function: independence, DME used if needed  Roles and Routines: drives, not working  Equipment Used at home: other (see comments) (DME used as needed: quad cane, RW, rollator, w/c)  Assistance upon Discharge: spouse if needed    Pain/Comfort:  Pain Rating 1: 0/10  Pain Rating Post-Intervention 1: 0/10    Patients cultural, spiritual, Rastafarian conflicts given the current situation: no    Objective:     Communicated with: nursing prior to session.  Patient found HOB elevated with telemetry, arterial line, central line, LVAD upon OT entry to room.    General Precautions: Standard, fall, LVAD  Orthopedic Precautions: N/A  Braces: N/A  Respiratory Status: Room air      Occupational Performance:    Bed Mobility:    Patient completed Scooting/Bridging with independence  Patient completed Supine to Sit with independence  Pt seated EOB with supervision    Functional Mobility/Transfers:  Patient completed Sit <> Stand Transfer with supervision  with  no assistive device   Functional Mobility: Pt engaged in functional mobility to simulate household/community distances with supervision and assistance for line management in order to maximize functional endurance and standing balance required for engagement in occupations of choice     Activities of Daily Living:  Upper Body Dressing: minimum assistance 2/2 medical lines present  Lower Body Dressing: supervision to barbara footwear while seated EOB    Cognitive/Visual Perceptual:  Cognitive/Psychosocial Skills:     -       Oriented to: Person, Place, Time, and Situation   -       Follows Commands/attention:Follows multistep  commands  -       Communication: clear/fluent  -       Memory: No Deficits noted  -       Safety awareness/insight to disability: intact   -       Mood/Affect/Coping skills/emotional control: Cooperative  Visual/Perceptual:      -Intact      Physical Exam:  Balance:    -       intact  Upper Extremity Range of Motion:     -       Right Upper Extremity: WFL  -       Left Upper Extremity: WFL  Upper Extremity Strength:    -       Right Upper Extremity: WFL  -       Left Upper Extremity: WFL   Strength:    -       Right Upper Extremity: WFL  -       Left Upper Extremity: WFL  Fine Motor Coordination:    -       Intact  Gross motor coordination:   WFL    AMPAC 6 Click ADL:  AMPAC Total Score: 24    Treatment & Education:  Session this date targeted initial OT evaluation and self-care activities to increase pt's independence  Pt educated on OT roles, POC, call button for assistance  Pt educated on importance of staff assistance for mobility activities   Pt educated on OT d/c recommendations, verbalizing  understanding    Patient left up in chair with all lines intact, call button in reach, and nursing notified    GOALS:   Multidisciplinary Problems       Occupational Therapy Goals       Not on file              Multidisciplinary Problems (Resolved)          Problem: Occupational Therapy    Goal Priority Disciplines Outcome Interventions   Occupational Therapy Goal   (Resolved)     OT, PT/OT Met    Description: Goals to be met by: 7/16/25     Patient will increase functional independence with ADLs by performing:    LE Dressing with Minimal Assistance.  Supine to sit with Minimal Assistance.  Step transfer with Minimal Assistance                         DME Justifications:  No DME recommended requiring DME justifications    History:     Past Medical History:   Diagnosis Date    A-fib     Anticoagulant long-term use     Atrial flutter 7/30/2022    CHF (congestive heart failure)     Class 1 obesity due to excess calories with serious comorbidity and body mass index (BMI) of 31.0 to 31.9 in adult     Class 1 obesity due to excess calories with serious comorbidity and body mass index (BMI) of 32.0 to 32.9 in adult     Dilated cardiomyopathy 1/10/2018    Disorder of kidney and ureter     CKD    Encounter for blood transfusion     Essential hypertension 8/28/2022    Gout     HTN (hypertension)     Hx of psychiatric care     ICD (implantable cardioverter-defibrillator) infection 7/1/2020    Psychiatric problem     Thyroid disease     Ventricular tachycardia (paroxysmal)          Past Surgical History:   Procedure Laterality Date    AORTIC VALVULOPLASTY N/A 07/13/2022    Procedure: REPAIR, AORTIC VALVE;  Surgeon: Yg Kaufman MD;  Location: [address];  Service: Cardiovascular;  Laterality: N/A;    APPLICATION OF WOUND VACUUM-ASSISTED CLOSURE DEVICE N/A 07/15/2022    Procedure: APPLICATION, WOUND VAC;  Surgeon: Yg Kaufman MD;  Location: [facility] OR [address] [place] [address];  Service: Cardiovascular;  Laterality: N/A;  50 x 5 cm      APPLICATION OF WOUND VACUUM-ASSISTED CLOSURE DEVICE Right 09/27/2022    Procedure: APPLICATION, WOUND VAC;  Surgeon: Kole Tabares MD;  Location: [facility] OR 2ND FLR;  Service: Plastics;  Laterality: Right;    CARDIAC CATHETERIZATION  12/2012    CARDIAC DEFIBRILLATOR PLACEMENT Left     CRRT-D    CARDIAC VALVE REPLACEMENT  03/08/2018    LVAD Implant    COLONOSCOPY N/A 03/06/2018    Procedure: COLONOSCOPY;  Surgeon: Alonso Bone MD;  Location: [facility] ENDO (2ND FLR);  Service: Endoscopy;  Laterality: N/A;    COLONOSCOPY N/A 07/17/2019    Procedure: COLONOSCOPY;  Surgeon: Blane Valdez MD;  Location: [facility] ENDO (2ND FLR);  Service: Endoscopy;  Laterality: N/A;    COLONOSCOPY N/A 07/18/2019    Procedure: COLONOSCOPY;  Surgeon: Blane Valdez MD;  Location: [facility] ENDO (2ND FLR);  Service: Endoscopy;  Laterality: N/A;    CREATION OF ILIOFEMORAL ARTERY BYPASS Right 09/27/2022    Procedure: CREATION, BYPASS, ARTERIAL, ILIAC TO FEMORAL WITH GRAFT;  Surgeon: Zach Hernández MD;  [facility];  Service: Peripheral Vascular;  Laterality: Right;    CREATION OF MUSCLE ROTATIONAL FLAP Right 09/27/2022    Procedure: CREATION, FLAP, MUSCLE ROTATION, SARTORIUS AND RECTUS FEMORIS;  Surgeon: Kole Tabares MD;  [facility];  Service: Plastics;  Laterality: Right;    ESOPHAGOGASTRODUODENOSCOPY N/A 07/17/2019    Procedure: EGD (ESOPHAGOGASTRODUODENOSCOPY);  Surgeon: Blane Valdez MD;  Location: [facility] ENDO (2ND FLR);  Service: Endoscopy;  Laterality: N/A;    ESOPHAGOGASTRODUODENOSCOPY N/A 07/18/2019    Procedure: EGD (ESOPHAGOGASTRODUODENOSCOPY);  Surgeon: Blane Valdez MD;  Location: [facility] ENDO (2ND FLR);  Service: Endoscopy;  Laterality: N/A;    FOREIGN BODY REMOVAL N/A 07/22/2022    Procedure: REMOVAL, FOREIGN BODY;  Surgeon: Yg Kaufman MD;  Location: [facility] OR [facility] FLR;  Service: Cardiovascular;  Laterality: N/A;  LVAD Heartmate 2 drive line removal    INSERTION OF GRAFT TO PERICARDIUM N/A 07/15/2022     Procedure: INSERTION, GRAFT, PERICARDIUM;  Surgeon: Yg Kaufman MD;  Location: [facility] OR [facility] FLR;  Service: Cardiovascular;  Laterality: N/A;    IRRIGATION OF MEDIASTINUM N/A 07/15/2022    Procedure: IRRIGATION, MEDIASTINUM;  Surgeon: Yg Kaufman MD;  [facility];  Service: Cardiovascular;  Laterality: N/A;    LYSIS OF ADHESIONS  07/13/2022    Procedure: LYSIS, ADHESIONS;  Surgeon: Yg Kaufman MD;  [facility];  Service: Cardiovascular;;    NONINVASIVE CARDIAC ELECTROPHYSIOLOGY STUDY N/A 10/18/2019    Procedure: CARDIAC ELECTROPHYSIOLOGY STUDY, NONINVASIVE;  Surgeon: Raz Wagner MD;  Location: [facility] EP LAB;  Service: Cardiology;  Laterality: N/A;  VT, DFTs, MDT CRTD in situ, LVAD, anes, MB, 3098    REPLACEMENT OF IMPLANTABLE CARDIOVERTER-DEFIBRILLATOR (ICD) GENERATOR N/A 03/09/2020    Procedure: REPLACEMENT, ICD GENERATOR;  Surgeon: Harry Yun MD;  Location: [facility] EP LAB;  Service: Cardiology;  Laterality: N/A;  VT, ICD Gen Change and Lead Revision, MDT, MAC, DM,3 Prep    REPLACEMENT OF LEFT VENTRICULAR ASSIST DEVICE (LVAD)  07/13/2022    Procedure: REPLACEMENT, LVAD;  Surgeon: Yg Kaufman MD;  [facility];  Service: Cardiovascular;;    REPLACEMENT OF PUMP N/A 07/13/2022    Procedure: REPLACEMENT, PUMP;  Surgeon: Yg Kaufman MD;  [facility];  Service: Cardiovascular;  Laterality: N/A;  LVAD pump exchange  EXPLANATION OF HEATMATE 2  IMPLANTATION OF HEARTMATE 3  IMPLANTATION OF 8MM CHIMNEY GRAFT TO RFA  INITIATION OF ECMO  TEMPORARY CLOSURE OF CHEST    REVISION OF IMPLANTABLE CARDIOVERTER-DEFIBRILLATOR (ICD) ELECTRODE LEAD PLACEMENT N/A 03/09/2020    Procedure: REVISION, INSERTION, ELECTRODE LEAD, ICD;  Surgeon: Harry Yun MD;  Location: [facility] EP LAB;  Service: Cardiology;  Laterality: N/A;  VT, ICD Gen Change and Lead Revision, MDT, MAC, DM,3 Prep    RIGHT HEART CATHETERIZATION Right 09/08/2023    Procedure: INSERTION, CATHETER,  RIGHT HEART;  Surgeon: Gaurav Rodriguez MD;  Location: [facility] CATH LAB;  Service: Cardiology;  Laterality: Right;    STERNAL WOUND CLOSURE N/A 07/14/2022    Procedure: CLOSURE, WOUND, STERNUM;  Surgeon: Yg Kaufman MD;  [facility];  Service: Cardiovascular;  Laterality: N/A;  temporary closure  evacuation of hematoma    STERNAL WOUND CLOSURE N/A 07/15/2022    Procedure: CLOSURE, WOUND, STERNUM;  Surgeon: Yg Kaufman MD;  [facility];  Service: Cardiovascular;  Laterality: N/A;    TRACHEOSTOMY N/A 08/04/2022    Procedure: CREATION, TRACHEOSTOMY;  Surgeon: Germain Holt MD;  Location: [facility] OR [address] [place] [address];  Service: General;  Laterality: N/A;    TREATMENT OF CARDIAC ARRHYTHMIA  10/18/2019    Procedure: Cardioversion or Defibrillation;  Surgeon: Raz Wagner MD;  Location: [facility] EP LAB;  Service: Cardiology;;       Time Tracking:     OT Date of Treatment: 07/16/25  OT Start Time: 1013  OT Stop Time: 1029  OT Total Time (min): 16 min    Billable Minutes:Evaluation 8  Self Care/Home Management 8    7/16/2025

## 2025-07-16 NOTE — PROGRESS NOTES
Discharge Note:    Pt to be dc home with no needs per team.    SW met with pt to discuss dc plans for today.  Pt was alert and oriented, calm and cooperative.  Pt reported that he was told that he will be dc today, and he voiced understanding and agreement with dc plans.   Pt reported that he is doing well, has no needs, and has a ride home.    SW remains available if needs arise.

## 2025-07-16 NOTE — PROGRESS NOTES
07/16/2025  Digna Swati    Current provider:  Guerda Bautista MD    Device interrogation:      7/16/2025     5:01 AM 7/16/2025     4:01 AM 7/16/2025     3:01 AM 7/16/2025     2:01 AM 7/16/2025     1:01 AM 7/16/2025    12:01 AM 7/15/2025    11:01 PM   TXP LVAD INTERROGATIONS   Type HeartMate3 HeartMate3 HeartMate3 HeartMate3 HeartMate3 HeartMate3 HeartMate3   Flow 5.7 5.8 5.7 5.8 5.7 5.7 6.1   Speed 6300 6300 6300 6300 6300 6300 6300   PI 1.6 1.5 1.5 1.4 1.9 1.5 1.6   Power ([place]) 5.5 5.5 5.5 5.5 5.3 5.5 5.6   LSL 5900 5900 5900 5900 5900 5900 5900   Pulsatility Intermittent pulse Intermittent pulse Intermittent pulse Intermittent pulse Intermittent pulse Intermittent pulse Intermittent pulse          Rounded on Tim Richards to ensure all mechanical assist device settings (IABP or VAD) were appropriate and all parameters were within limits.  I was able to ensure all back up equipment was present, the staff had no issues, and the Perfusion Department daily rounding was complete.      For implantable VADs: Interrogation of Ventricular assist device was performed with analysis of device parameters and review of device function. I have personally reviewed the interrogation findings and agree with findings as stated.     In emergency, the nursing units have been notified to contact the perfusion department either by:  Calling [phone] from 630am to 4pm Mon thru Fri, utilizing the On-Call Finder functionality of Epic and searching for Perfusion, or by contacting the hospital  from 4pm to 630am and on weekends and asking to speak with the perfusionist on call.    8:40 AM

## 2025-07-16 NOTE — DISCHARGE SUMMARY
[facility] - Cardiac Intensive Care  Cardiology  Discharge Summary      Patient Name: Tim Richards  MRN: [id number]  Admission Date: 7/14/2025  Hospital Length of Stay: 2 days  Discharge Date and Time: 07/16/2025 12:07 PM  Attending Physician: Guerda Bautista MD    Discharging Provider: Maurizio Johnston MD  Primary Care Physician: Diego Daniel MD    HPI:   Tim Richards is a 59 y.o. M w a history of NICM, stage D HFrEF s/p HM2 (implanted 3/8/2018 as DT-VAD) but required pump exchange to HM3 (7/13/2022) 2/2 pump thrombosis, VT (on Amiodarone and Mexiletine) s/p S-ICD ([place]), AFL, hyperthyroidism (Amiodarone induced), and hypothyroidism who presented with lightheadedness, found to be in VF.      Patient reports onset of lightheadedness and palpitations starting 4 days ago. States he had a LFA 2 days ago when trying to stand up. He also notes increased dyspnea, edema of thighs, and abdominal distension. He reports reduced UOP on home Torsemide PO. He denies syncope, fever/chills, chest pain, orthopnea, n/v. Denies increased drainage from DL. He reports compliance with Amiodarone and Mexiletine; no longer prescribed a BB.      Patient recently seen by Dr. Fay on 06/04/25 for history of VT. He had been admitted in 04/2025 for VT storm while off Mexiletine due to cost/insurance coverage and in the s/o recent etoh use, resulting in multiple device shocks. He briefly required IV amiodarone and IV lidocaine before being switched to PO amiodarone and PO mexiletine prior to discharge. He elected to deactivate tachy therapies prior to discharge as he became extraordinarily anxious with shocks. In-clinic ICD check 06/04/25, no VT since admission.     In the ED, patient in ventricular fibrillation on telemetry and confirmed with bedside interrogation of his device. S/p DCCV x1 with return to sinus rhythm. Hemodynamically and clinically stable through event. Labs notable for AST//258 respectively, Cr 4.3  (baseline Cr 2), K 4.8, POC lactate 1.8, INR 3.1.     * No surgery found *     Indwelling Lines/Drains at time of discharge:  Lines/Drains/Airways       Central Venous Catheter Line  Duration             Percutaneous Central Line Triple Lumen 07/15/25 0032 Internal Jugular Right 1 day              Arterial Line  Duration             Arterial Line 07/15/25 0010 Left Radial 1 day                    Hospital Course:  Patient has a history of nonischemic cardiomyopathy, stage D heart failure reduced EF status post hm 2 in 2018 as destination therapy, history of pump thrombosis, VT on anticoagulations, presented to the hospital with lightheadedness and palpitation along with low-flow alarms, found to be in VF.  Initially was started on Lasix drip and amiodarone drip, CVP remains low, Lasix/diuretics were stopped, EP was consulted, completed amiodarone for 24 hours then placed on p.o. amiodarone 400 mg daily along with mexiletine, patient does not want his device switched on for shocking, nothing to offer from EP, patent back to baseline ready to be discharged, EP the okay to start inpatient as well. No changes in his home meds.     Goals of Care Treatment Preferences:  Code Status: Full Code      Consults:   Consults (From admission, onward)          Status Ordering Provider     Inpatient consult to Electrophysiology  Once        Provider:  (Not yet assigned)    Completed BRENNEN DUMONT     Inpatient consult to Registered Dietitian/Nutritionist  Once        Provider:  (Not yet assigned)    Completed EUGENE BAZAN            Significant Diagnostic Studies: N/A    Pending Diagnostic Studies:       Procedure Component Value Units Date/Time    Renal Function Panel [[id number]]     Order Status: Sent Lab Status: No result     Specimen: Blood             Final Active Diagnoses:    Diagnosis Date Noted POA    PRINCIPAL PROBLEM:  Ventricular fibrillation [I49.01] 03/09/2020 Yes    Acute kidney injury superimposed on CKD [N17.9,  N18.9] 07/15/2022 Yes    LVAD (left ventricular assist device) present [Z95.811] 06/15/2018 Not Applicable     Chronic    Stage 3b chronic kidney disease [N18.32] 08/28/2022 Yes    Anticoagulation monitoring, INR range 2-3 [Z79.01] 08/28/2022 Not Applicable    Depression with anxiety [F41.8] 08/13/2022 Yes     Chronic    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  Service: Cardiology      Discharged Condition: fair    Disposition: Home or Self Care    Follow Up:    Patient Instructions:      ACCEPT - Ambulatory referral/consult to [facility]   Standing Status: Future   Referral Priority: Routine Referral Type: Consultation   Referral Reason: Specialty Services Required   Requested Specialty: Cardiology   Number of Visits Requested: 1     Diet Cardiac     Activity as tolerated     Medications:  Reconciled Home Medications:      Medication List        CHANGE how you take these medications      amiodarone 400 MG tablet  Commonly known as: PACERONE  Take 1 tablet (400 mg total) by mouth once daily.  What changed:   when to take this  additional instructions     mexiletine 250 MG Cap  Commonly known as: MEXITIL  Take 1 capsule (250 mg total) by mouth every 8 (eight) hours.  What changed: Another medication with the same name was removed. Continue taking this medication, and follow the directions you see here.     warfarin 5 MG tablet  Commonly known as: COUMADIN  Take 1 tablet (5mg) daily, except 1.5 tablets (7.5mg) on Tuesdays and Thursdays  What changed:   how much to take  how to take this  when to take this  additional instructions            CONTINUE taking these  medications      ALPRAZolam 1 MG tablet  Commonly known as: XANAX  Take 1 mg by mouth 2 (two) times daily as needed.     amLODIPine 10 MG tablet  Commonly known as: NORVASC  Take 1 tablet (10 mg total) by mouth once daily.     busPIRone 5 MG Tab  Commonly known as: BUSPAR  Take 1 tablet (5 mg total) by mouth 2 (two) times daily.     cefadroxil 500 MG Cap  Commonly known as: DURICEF  Take 1 capsule (500 mg total) by mouth every 12 (twelve) hours.     ferrous gluconate 324 mg (37.5 mg iron) Tab tablet  TAKE 1 TABLET BY MOUTH 2 TIMES DAILY WITH MEALS.     levothyroxine 125 MCG tablet  Commonly known as: SYNTHROID  Take 1 tablet (125 mcg total) by mouth before breakfast.     magnesium oxide 400 mg (241.3 mg magnesium) tablet  Commonly known as: MAG-OX  Take 1 tablet (400 mg total) by mouth once daily.     mirtazapine 45 MG tablet  Commonly known as: REMERON  Take 1 tablet (45 mg total) by mouth every evening.     omega-3 acid ethyl esters 1 gram capsule  Commonly known as: LOVAZA  Take 2 capsules (2 g total) by mouth 2 (two) times daily.     pantoprazole 40 MG tablet  Commonly known as: PROTONIX  Take 1 tablet (40 mg total) by mouth daily with lunch.     potassium chloride SA 20 MEQ tablet  Commonly known as: K-DUR,KLOR-CON  Take orally daily when taking torsemide     torsemide 20 MG Tab  Commonly known as: DEMADEX  Take 1 tablets (20mg) on Mondays.     zolpidem 12.5 MG CR tablet  Commonly known as: AMBIEN CR  TAKE 1 TABLET (12.5 MG TOTAL) BY MOUTH NIGHTLY AS NEEDED FOR INSOMNIA.            STOP taking these medications      cyanocobalamin 1,000 mcg/mL injection              Time spent on the discharge of patient: 30 minutes    Maurizio Johnston MD  Cardiology  [facility] - Cardiac Intensive Care

## 2025-07-16 NOTE — PROGRESS NOTES
Pt AAAO, no family at bedside.  Pt d/c home today. Will keep all appts next week.  No needs from me at this time.

## 2025-07-16 NOTE — PLAN OF CARE
CICU DAILY GOALS       AE: Early(intubated/ Progressive (non-intubated) Mobility   MOVE Screen: Pass   Activity: Activity Management: Rolling - L1  FAS: Feeding/Nutrition   Diet order: Diet/Nutrition Received: no added salt, 2 gram sodium, low saturated fat/low cholesterol,   Fluid restriction:     Nutritional Supplement Intake: Quantity 0, Type: Boost  T: Thrombus   DVT prophylaxis: VTE Core Measure: Pharmacological prophylaxis initiated/maintained  H: HOB Elevation   Head of Bed (HOB) Positioning: HOB at 30 degrees  U: Ulcer Prophylaxis   GI: no  G: Glucose control   managed    S: Skin   Bathing/Skin Care: (S) bath, complete (07/15/25 3345)  Wounds: Yes  Wound care consulted: Yes  B: Bowel Function   no issues   I: Indwelling Catheters   [place] necessity:     CVC necessity: Yes  D: De-escalation Antibx   No  Plan for the day   Plan to continue iv amio and bridge to PO  Family/Goals of care/Code Status   Code Status: Full Code     No acute events throughout day, VS and assessment per flow sheet, patient progressing towards goals as tolerated, plan of care reviewed with Tim Richards and family, all concerns addressed, will continue to monitor.

## 2025-07-16 NOTE — EICU
Intervention Initiated From:  COR / LUIS MANUELU    Esther intervened regarding:  Rounding (Video assessment)    VICU Night Rounds Checklist  24H Vital Sign Range:  Temp:  [98.3 °F (36.8 °C)-98.7 °F (37.1 °C)]   Pulse:  [0-127]   Resp:  [15-25]   BP: (87-90)/(0)   SpO2:  [82 %-100 %]   Arterial Line BP: ()/(23-86)     Video rounds and LDA reconciliation

## 2025-07-16 NOTE — PLAN OF CARE
PT evaluation complete. Goals met. Pt is baseline with mobility and has no acute PT needs at this time. D/C from PT services.    Problem: Physical Therapy  Goal: Physical Therapy Goal  Description: Goals to be met by: 2025     Patient will increase functional independence with mobility by performin. Gait  x 25 feet with Supervision using no AD.     Outcome: Met     2025

## 2025-07-16 NOTE — PLAN OF CARE
Problem: Occupational Therapy  Goal: Occupational Therapy Goal  Description: Goals to be met by: 7/16/25     Patient will increase functional independence with ADLs by performing:    LE Dressing with Minimal Assistance.  Supine to sit with Minimal Assistance.  Step transfer with Minimal Assistance    Outcome: Met

## 2025-07-16 NOTE — PLAN OF CARE
Problem: Ventricular Assist Device  Goal: Optimal Adjustment to Device  7/16/2025 0931 by Richard Rosenthal RN  Outcome: Progressing  7/16/2025 0930 by Richard Rosenthal RN  Outcome: Progressing  Goal: Absence of Bleeding  7/16/2025 0931 by Richard Rosenthal RN  Outcome: Progressing  7/16/2025 0930 by Richard Rosenthal RN  Outcome: Progressing  Goal: Absence of Embolism Signs and Symptoms  7/16/2025 0931 by Richard Rosenthal RN  Outcome: Progressing  7/16/2025 0930 by Richard Rosenthal RN  Outcome: Progressing  Goal: Optimal Blood Flow  7/16/2025 0931 by Richard Rosenthal RN  Outcome: Progressing  7/16/2025 0930 by Richard Rosenthal RN  Outcome: Progressing  Goal: Absence of Infection Signs and Symptoms  7/16/2025 0931 by Richard Rosenthal RN  Outcome: Progressing  7/16/2025 0930 by Richard Rosenthal RN  Outcome: Progressing  Goal: Effective Right-Sided Heart Function  7/16/2025 0931 by Richard Rosenhtal RN  Outcome: Progressing  7/16/2025 0930 by Richard Rosenthal RN  Outcome: Progressing     Problem: Infection  Goal: Absence of Infection Signs and Symptoms  7/16/2025 0931 by Richard Rosenthal RN  Outcome: Progressing  7/16/2025 0930 by Richard Rosenthal RN  Outcome: Progressing     Problem: Acute Kidney Injury/Impairment  Goal: Fluid and Electrolyte Balance  7/16/2025 0931 by Richard Rosenthal RN  Outcome: Progressing  7/16/2025 0930 by Richard Rosenthal RN  Outcome: Progressing  Goal: Improved Oral Intake  7/16/2025 0931 by Richard Rosenthal RN  Outcome: Progressing  7/16/2025 0930 by Richard Rosenthal RN  Outcome: Progressing  Goal: Effective Renal Function  7/16/2025 0931 by Richard Rosenthal RN  Outcome: Progressing  7/16/2025 0930 by Richard Rosenthal RN  Outcome: Progressing

## 2025-07-16 NOTE — PT/OT/SLP EVAL
Physical Therapy Co-Evaluation and Co-Treatment and Discharge Note    Patient Name:  Tim Richards   MRN:  [id number]    Recommendations:     Discharge Recommendations: No Therapy Indicated  Discharge Equipment Recommendations: none   Barriers to Discharge: None  Safest Mobility Level with Nursing: Ambulation with supervision    Assessment:     Tim Richards is a 59 y.o. male admitted with a medical diagnosis of Ventricular fibrillation. Patient is able to complete all visualized functional mobility with supervision. They are functioning at their baseline and no longer require acute care physical therapy.    Plan:     During this hospitalization, patient does not require further acute PT services. Please re-consult if functional status changes.      Subjective     Chief Complaint: None verbalized  Patient/Family Comments/goals: To go home  Pain/Comfort:  Pain Rating 1: 0/10    Patients cultural, spiritual, Religion conflicts given the current situation: no    Living Environment:  Living Environment: pt lives with spouse in 2 SH, 5 SALVADOR with B HR, 12 steps to 2nd level with L HR, bedroom and bathroom available if needed on 1st floor, t/s and WIS with shower chair  Previous level of function: independence, DME used if needed  Roles and Routines: drives, not working  Equipment Used at home: other (see comments) (DME used as needed: quad cane, RW, rollator, w/c)  Assistance upon Discharge: spouse if needed    Objective:     Communicated with RN prior to session.  Patient found HOB elevated with telemetry, arterial line, central line, LVAD, blood pressure cuff, pulse ox (continuous) upon PT entry to room.    General Precautions: Standard, fall, LVAD   Orthopedic Precautions:N/A   Braces: N/A    Exams:  Cognitive Exam:  Patient is oriented to Person, Place, Time, Situation  RLE ROM: WFL  RLE Strength: WFL  LLE ROM: WFL  LLE Strength: WFL  Sensation: Intact light touch to BLEs    Functional Mobility:  Bed Mobility:      Supine to Sit: independence  Transfers:     Sit to Stand: independence with no AD  Gait: Patient ambulated 20' with no AD and supervision.   Patient demonstrates steady gait.  Patient required cues for upright posture and obstacle navigation.  All lines remained intact throughout ambulation trial.    Therapeutic Activities and Exercises:  Patient educated on role of acute care PT and PT POC, safety while in hospital including calling nurse for mobility, and call light usage  Pt educated on the effects of bed rest and the importance of OOB activity. Pt encouraged to sit UIC majority of day as tolerated and continue daily ambulation with nursing assist. Pt verbalized understanding.  Educated about gradual progression of activity once out of hospital  Educated about safety with functional mobility  Answered all questions within PT scope of practice to patient's satisfaction  Pt found on wall power / left on wall power. No LVAD alarms sounded during session.     AM-PAC 6 CLICK MOBILITY  Total Score:22     Patient left up in chair with all lines intact, call button in reach, and RN notified.    GOALS:   Multidisciplinary Problems       Physical Therapy Goals       Not on file              Multidisciplinary Problems (Resolved)          Problem: Physical Therapy    Goal Priority Disciplines Outcome Interventions   Physical Therapy Goal   (Resolved)     PT, PT/OT Met    Description: Goals to be met by: 2025     Patient will increase functional independence with mobility by performin. Gait  x 25 feet with Supervision using no AD.                          DME Justifications:  No DME recommended requiring DME justifications    History:     Past Medical History:   Diagnosis Date    A-fib     Anticoagulant long-term use     Atrial flutter 2022    CHF (congestive heart failure)     Class 1 obesity due to excess calories with serious comorbidity and body mass index (BMI) of 31.0 to 31.9 in adult     Class 1  obesity due to excess calories with serious comorbidity and body mass index (BMI) of 32.0 to 32.9 in adult     Dilated cardiomyopathy 1/10/2018    Disorder of kidney and ureter     CKD    Encounter for blood transfusion     Essential hypertension 8/28/2022    Gout     HTN (hypertension)     Hx of psychiatric care     ICD (implantable cardioverter-defibrillator) infection 7/1/2020    Psychiatric problem     Thyroid disease     Ventricular tachycardia (paroxysmal)        Past Surgical History:   Procedure Laterality Date    AORTIC VALVULOPLASTY N/A 07/13/2022    Procedure: REPAIR, AORTIC VALVE;  Surgeon: Yg Kaufman MD;  [facility];  Service: Cardiovascular;  Laterality: N/A;    APPLICATION OF WOUND VACUUM-ASSISTED CLOSURE DEVICE N/A 07/15/2022    Procedure: APPLICATION, WOUND VAC;  Surgeon: Yg Kaufman MD;  [facility];  Service: Cardiovascular;  Laterality: N/A;  50 x 5 cm     APPLICATION OF WOUND VACUUM-ASSISTED CLOSURE DEVICE Right 09/27/2022    Procedure: APPLICATION, WOUND VAC;  Surgeon: Kole Tabares MD;  [facility];  Service: Plastics;  Laterality: Right;    CARDIAC CATHETERIZATION  12/2012    CARDIAC DEFIBRILLATOR PLACEMENT Left     CRRT-D    CARDIAC VALVE REPLACEMENT  03/08/2018    LVAD Implant    COLONOSCOPY N/A 03/06/2018    Procedure: COLONOSCOPY;  Surgeon: Alonso Bone MD;  [facility]  Service: Endoscopy;  Laterality: N/A;    COLONOSCOPY N/A 07/17/2019    Procedure: COLONOSCOPY;  Surgeon: Blane Valdez MD;  Location: [facility]O [facility]  Service: Endoscopy;  Laterality: N/A;    COLONOSCOPY N/A 07/18/2019    Procedure: COLONOSCOPY;  Surgeon: Blane Valdez MD;  Location: [facility]O [facility]  Service: Endoscopy;  Laterality: N/A;    CREATION OF ILIOFEMORAL ARTERY BYPASS Right 09/27/2022    Procedure: CREATION, BYPASS, ARTERIAL, ILIAC TO FEMORAL WITH GRAFT;  Surgeon: Zach Hernández MD;  [facility];  Service:  Peripheral Vascular;  Laterality: Right;    CREATION OF MUSCLE ROTATIONAL FLAP Right 09/27/2022    Procedure: CREATION, FLAP, MUSCLE ROTATION, SARTORIUS AND RECTUS FEMORIS;  Surgeon: Kole Tabares MD;  Location: [facility] OR 2ND FLR;  Service: Plastics;  Laterality: Right;    ESOPHAGOGASTRODUODENOSCOPY N/A 07/17/2019    Procedure: EGD (ESOPHAGOGASTRODUODENOSCOPY);  Surgeon: Blane Valdez MD;  Location: [facility] ENDO (2ND FLR);  Service: Endoscopy;  Laterality: N/A;    ESOPHAGOGASTRODUODENOSCOPY N/A 07/18/2019    Procedure: EGD (ESOPHAGOGASTRODUODENOSCOPY);  Surgeon: Blane Valdez MD;  Location: [facility] ENDO (2ND FLR);  Service: Endoscopy;  Laterality: N/A;    FOREIGN BODY REMOVAL N/A 07/22/2022    Procedure: REMOVAL, FOREIGN BODY;  Surgeon: Yg Kaufman MD;  Location: [facility] OR [facility] F[facility];  Service: Cardiovascular;  Laterality: N/A;  LVAD Heartmate 2 drive line removal    INSERTION OF GRAFT TO PERICARDIUM N/A 07/15/2022    Procedure: INSERTION, GRAFT, PERICARDIUM;  Surgeon: Yg Kaufman MD;  Location: [facility] OR [facility] FLR;  Service: Cardiovascular;  Laterality: N/A;    IRRIGATION OF MEDIASTINUM N/A 07/15/2022    Procedure: IRRIGATION, MEDIASTINUM;  Surgeon: Yg Kaufman MD;  [facility];  Service: Cardiovascular;  Laterality: N/A;    LYSIS OF ADHESIONS  07/13/2022    Procedure: LYSIS, ADHESIONS;  Surgeon: Yg Kaufman MD;  Location: [facility] OR [facility] FLR;  Service: Cardiovascular;;    NONINVASIVE CARDIAC ELECTROPHYSIOLOGY STUDY N/A 10/18/2019    Procedure: CARDIAC ELECTROPHYSIOLOGY STUDY, NONINVASIVE;  Surgeon: Raz Wagner MD;  Location: [facility] EP LAB;  Service: Cardiology;  Laterality: N/A;  VT, DFTs, MDT CRTD in situ, LVAD, aneLASHELL manjarrez, 3098    REPLACEMENT OF IMPLANTABLE CARDIOVERTER-DEFIBRILLATOR (ICD) GENERATOR N/A 03/09/2020    Procedure: REPLACEMENT, ICD GENERATOR;  Surgeon: Harry Yun MD;  Location: [facility] EP LAB;  Service: Cardiology;  Laterality: N/A;  VT, ICD Gen Change and Lead Revision, MDT, MAC,  DM,3 Prep    REPLACEMENT OF LEFT VENTRICULAR ASSIST DEVICE (LVAD)  07/13/2022    Procedure: REPLACEMENT, LVAD;  Surgeon: Yg Kaufman MD;  [facility];  Service: Cardiovascular;;    REPLACEMENT OF PUMP N/A 07/13/2022    Procedure: REPLACEMENT, PUMP;  Surgeon: Yg Kaufman MD;  [facility];  Service: Cardiovascular;  Laterality: N/A;  LVAD pump exchange  EXPLANATION OF HEATMATE 2  IMPLANTATION OF HEARTMATE 3  IMPLANTATION OF 8MM CHIMNEY GRAFT TO RFA  INITIATION OF ECMO  TEMPORARY CLOSURE OF CHEST    REVISION OF IMPLANTABLE CARDIOVERTER-DEFIBRILLATOR (ICD) ELECTRODE LEAD PLACEMENT N/A 03/09/2020    Procedure: REVISION, INSERTION, ELECTRODE LEAD, ICD;  Surgeon: Harry Yun MD;  Location: [facility] EP LAB;  Service: Cardiology;  Laterality: N/A;  VT, ICD Gen Change and Lead Revision, MDT, MAC, DM,3 Prep    RIGHT HEART CATHETERIZATION Right 09/08/2023    Procedure: INSERTION, CATHETER, RIGHT HEART;  Surgeon: Gaurav Rodriguez MD;  Location: [facility] CATH LAB;  Service: Cardiology;  Laterality: Right;    STERNAL WOUND CLOSURE N/A 07/14/2022    Procedure: CLOSURE, WOUND, STERNUM;  Surgeon: Yg Kaufman MD;  [facility];  Service: Cardiovascular;  Laterality: N/A;  temporary closure  evacuation of hematoma    STERNAL WOUND CLOSURE N/A 07/15/2022    Procedure: CLOSURE, WOUND, STERNUM;  Surgeon: Yg Kaufman MD;  [facility];  Service: Cardiovascular;  Laterality: N/A;    TRACHEOSTOMY N/A 08/04/2022    Procedure: CREATION, TRACHEOSTOMY;  Surgeon: Germain Holt MD;  [facility];  Service: General;  Laterality: N/A;    TREATMENT OF CARDIAC ARRHYTHMIA  10/18/2019    Procedure: Cardioversion or Defibrillation;  Surgeon: Raz Wagner MD;  Location: [facility] EP LAB;  Service: Cardiology;;       Time Tracking:     PT Received On: 07/16/25  PT Start Time: 1013     PT Stop Time: 1029  PT Total Time (min): 16 min     Billable Minutes: Evaluation 8  Therapeutic Activity 8      07/16/2025

## 2025-07-16 NOTE — CARE UPDATE
Care Update Note:    Hemodynamics@8pm:  CVP: 2  SVO2: 66  CO: 7.26  CI: 2.94  SVR: 937      Continuous Infusions:   amiodarone in dextrose 5%  0.5 mg/min Intravenous Continuous 16.7 mL/hr at 07/15/25 1800 0.5 mg/min at 07/15/25 1800       Intake/Output Summary (Last 24 hours) at 7/15/2025 2157  Last data filed at 7/15/2025 1800  Gross per 24 hour   Intake 1219.15 ml   Output 2500 ml   Net -1280.85 ml         Plan:  No changes. Continue current management.    Discussed with attending.    BRYSON Us  Cardiovascular Disease fellow, PGY-5  [facility]

## 2025-07-17 ENCOUNTER — ANTI-COAG VISIT (OUTPATIENT)
Dept: CARDIOLOGY | Facility: CLINIC | Age: 60
End: 2025-07-17
Payer: MEDICARE

## 2025-07-17 NOTE — PROGRESS NOTES
Admitted 7/14-7/16. Hospital course: Tim Richards is a 59 y.o. male s/p Hm3 VAD who was admitted for VT. He underwent DCCV and placed on IV amiodarone briefly. He continues with amio/mexiletine as previously prescribed.

## 2025-07-17 NOTE — PROGRESS NOTES
Pt wife confirmed correct dose per calendar. Wife declined INR on 7/21 and states he has LVAD appt on 7/23 scheduled. Would like to keep lab draw as already scheduled.

## 2025-07-18 ENCOUNTER — PATIENT MESSAGE (OUTPATIENT)
Dept: FAMILY MEDICINE | Facility: CLINIC | Age: 60
End: 2025-07-18
Payer: MEDICARE

## 2025-07-18 ENCOUNTER — DOCUMENTATION ONLY (OUTPATIENT)
Dept: CARDIOLOGY | Facility: CLINIC | Age: 60
End: 2025-07-18
Payer: MEDICARE

## 2025-07-18 ENCOUNTER — PATIENT OUTREACH (OUTPATIENT)
Dept: ADMINISTRATIVE | Facility: CLINIC | Age: 60
End: 2025-07-18
Payer: MEDICARE

## 2025-07-18 ENCOUNTER — TELEPHONE (OUTPATIENT)
Dept: FAMILY MEDICINE | Facility: CLINIC | Age: 60
End: 2025-07-18
Payer: MEDICARE

## 2025-07-18 NOTE — PROGRESS NOTES
C3 nurse attempted to contact Tim Richards  for a TCC post hospital discharge follow up call. No answer. LVM requesting a callback at [phone].    The patient does not have a scheduled HOSFU appointment. Message sent to PCP's staff to assist with HOSFU appointment scheduling.

## 2025-07-18 NOTE — PROGRESS NOTES
Heart Failure Transitional Care Clinic (HFTCC) Team notified of pt referral via Ambulatory Referral to Heart Failure Transitional Care ([id number]).    Patient screened today 7/18/2025 by provider and LPN for enrollment to program.      Pt was deemed not a candidate for enrollment at this time related to patient [place] [facility]     Pt will require additional follow up planning per primary team.     If pt status, diagnosis, or treatment plan changes , please place AMB referral to Heart Failure Transitional Care Clinic ([id number]) for HFTCC enrollment re-evalution.

## 2025-07-20 LAB
BACTERIA BLD CULT: NORMAL
BACTERIA BLD CULT: NORMAL

## 2025-07-22 ENCOUNTER — TELEPHONE (OUTPATIENT)
Dept: TRANSPLANT | Facility: CLINIC | Age: 60
End: 2025-07-22
Payer: MEDICARE

## 2025-07-22 ENCOUNTER — PATIENT MESSAGE (OUTPATIENT)
Dept: INFECTIOUS DISEASES | Facility: CLINIC | Age: 60
End: 2025-07-22
Payer: MEDICARE

## 2025-07-22 DIAGNOSIS — Z95.811 LVAD (LEFT VENTRICULAR ASSIST DEVICE) PRESENT: Chronic | ICD-10-CM

## 2025-07-22 DIAGNOSIS — I50.42 CHRONIC COMBINED SYSTOLIC AND DIASTOLIC HEART FAILURE: Chronic | ICD-10-CM

## 2025-07-22 NOTE — TELEPHONE ENCOUNTER
VAD Contact: Left voicemail for Patient regarding equipment maintenance due at upcoming clinic appointment on 723.  Requested Patient to bring Battery Charger (UBC), Power Module, 8 14-V Batteries, and Emergency Bag (2 batteries, 2 clips, 1 backup controller) with them to next appointment for maintenance.  It is medically necessary to ensure patient has properly functioning equipment and wearables to prevent infection, injury or death to patient.

## 2025-07-23 ENCOUNTER — CLINICAL SUPPORT (OUTPATIENT)
Dept: TRANSPLANT | Facility: CLINIC | Age: 60
End: 2025-07-23
Payer: MEDICARE

## 2025-07-23 ENCOUNTER — OFFICE VISIT (OUTPATIENT)
Dept: INFECTIOUS DISEASES | Facility: CLINIC | Age: 60
End: 2025-07-23
Payer: MEDICARE

## 2025-07-23 ENCOUNTER — HOSPITAL ENCOUNTER (OUTPATIENT)
Dept: PULMONOLOGY | Facility: CLINIC | Age: 60
Discharge: HOME | End: 2025-07-23
Payer: MEDICARE

## 2025-07-23 ENCOUNTER — HOSPITAL ENCOUNTER (OUTPATIENT)
Dept: RADIOLOGY | Facility: HOSPITAL | Age: 60
Discharge: HOME | End: 2025-07-23
Attending: INTERNAL MEDICINE
Payer: MEDICARE

## 2025-07-23 ENCOUNTER — ANTI-COAG VISIT (OUTPATIENT)
Dept: CARDIOLOGY | Facility: CLINIC | Age: 60
End: 2025-07-23
Payer: MEDICARE

## 2025-07-23 ENCOUNTER — TELEPHONE (OUTPATIENT)
Dept: FAMILY MEDICINE | Facility: CLINIC | Age: 60
End: 2025-07-23
Payer: MEDICARE

## 2025-07-23 ENCOUNTER — OFFICE VISIT (OUTPATIENT)
Dept: TRANSPLANT | Facility: CLINIC | Age: 60
End: 2025-07-23
Attending: INTERNAL MEDICINE
Payer: MEDICARE

## 2025-07-23 ENCOUNTER — TELEPHONE (OUTPATIENT)
Dept: TRANSPLANT | Facility: CLINIC | Age: 60
End: 2025-07-23

## 2025-07-23 VITALS — HEIGHT: 73 IN | BODY MASS INDEX: 30.48 KG/M2 | TEMPERATURE: 98 F | WEIGHT: 230 LBS | SYSTOLIC BLOOD PRESSURE: 58 MMHG

## 2025-07-23 VITALS — BODY MASS INDEX: 31.23 KG/M2 | HEIGHT: 73 IN | WEIGHT: 235.63 LBS

## 2025-07-23 DIAGNOSIS — Z95.811 LVAD (LEFT VENTRICULAR ASSIST DEVICE) PRESENT: Primary | Chronic | ICD-10-CM

## 2025-07-23 DIAGNOSIS — T82.7XXA INFECTION ASSOCIATED WITH DRIVELINE OF VENTRICULAR ASSIST DEVICE: Primary | ICD-10-CM

## 2025-07-23 DIAGNOSIS — I51.89 COMBINED SYSTOLIC AND DIASTOLIC CARDIAC DYSFUNCTION: ICD-10-CM

## 2025-07-23 DIAGNOSIS — I47.20 VT (VENTRICULAR TACHYCARDIA): ICD-10-CM

## 2025-07-23 DIAGNOSIS — Z95.811 LVAD (LEFT VENTRICULAR ASSIST DEVICE) PRESENT: Chronic | ICD-10-CM

## 2025-07-23 DIAGNOSIS — I50.42 CHRONIC COMBINED SYSTOLIC AND DIASTOLIC HEART FAILURE: Chronic | ICD-10-CM

## 2025-07-23 DIAGNOSIS — J84.10 LUNG FIBROSIS: ICD-10-CM

## 2025-07-23 DIAGNOSIS — Z95.811 LVAD (LEFT VENTRICULAR ASSIST DEVICE) PRESENT: Primary | ICD-10-CM

## 2025-07-23 DIAGNOSIS — Z95.811 HEART REPLACED BY HEART ASSIST DEVICE: ICD-10-CM

## 2025-07-23 DIAGNOSIS — Z95.811 LVAD (LEFT VENTRICULAR ASSIST DEVICE) PRESENT: ICD-10-CM

## 2025-07-23 DIAGNOSIS — Z79.2 CHRONIC ANTIBIOTIC SUPPRESSION: ICD-10-CM

## 2025-07-23 PROCEDURE — 87077 CULTURE AEROBIC IDENTIFY: CPT | Performed by: INTERNAL MEDICINE

## 2025-07-23 PROCEDURE — 71250 CT THORAX DX C-: CPT | Mod: TC

## 2025-07-23 PROCEDURE — 1111F DSCHRG MED/CURRENT MED MERGE: CPT | Mod: CPTII,S$GLB,, | Performed by: INTERNAL MEDICINE

## 2025-07-23 PROCEDURE — 99999 PR PBB SHADOW E&M-EST. PATIENT-LVL II: CPT | Mod: PBBFAC,,, | Performed by: INTERNAL MEDICINE

## 2025-07-23 PROCEDURE — 3008F BODY MASS INDEX DOCD: CPT | Mod: CPTII,S$GLB,, | Performed by: INTERNAL MEDICINE

## 2025-07-23 PROCEDURE — 99214 OFFICE O/P EST MOD 30 MIN: CPT | Mod: S$GLB,,, | Performed by: INTERNAL MEDICINE

## 2025-07-23 PROCEDURE — 1160F RVW MEDS BY RX/DR IN RCRD: CPT | Mod: CPTII,S$GLB,, | Performed by: INTERNAL MEDICINE

## 2025-07-23 PROCEDURE — 87205 SMEAR GRAM STAIN: CPT | Performed by: INTERNAL MEDICINE

## 2025-07-23 PROCEDURE — 1159F MED LIST DOCD IN RCRD: CPT | Mod: CPTII,S$GLB,, | Performed by: INTERNAL MEDICINE

## 2025-07-23 PROCEDURE — 87075 CULTR BACTERIA EXCEPT BLOOD: CPT | Performed by: INTERNAL MEDICINE

## 2025-07-23 PROCEDURE — 71250 CT THORAX DX C-: CPT | Mod: 26,,, | Performed by: RADIOLOGY

## 2025-07-23 PROCEDURE — G2211 COMPLEX E/M VISIT ADD ON: HCPCS | Mod: S$GLB,,, | Performed by: INTERNAL MEDICINE

## 2025-07-23 PROCEDURE — 99999 PR PBB SHADOW E&M-EST. PATIENT-LVL IV: CPT | Mod: PBBFAC,,, | Performed by: INTERNAL MEDICINE

## 2025-07-23 RX ORDER — LANOLIN ALCOHOL/MO/W.PET/CERES
400 CREAM (GRAM) TOPICAL DAILY
Qty: 90 TABLET | Refills: 3 | Status: SHIPPED | OUTPATIENT
Start: 2025-07-23

## 2025-07-23 RX ORDER — AMIODARONE HYDROCHLORIDE 400 MG/1
400 TABLET ORAL DAILY
Qty: 90 TABLET | Refills: 3 | Status: SHIPPED | OUTPATIENT
Start: 2025-07-23

## 2025-07-23 RX ORDER — AMLODIPINE BESYLATE 10 MG/1
10 TABLET ORAL DAILY
Qty: 90 TABLET | Refills: 3 | Status: CANCELLED | OUTPATIENT
Start: 2025-07-23

## 2025-07-23 RX ORDER — PANTOPRAZOLE SODIUM 40 MG/1
40 TABLET, DELAYED RELEASE ORAL
Qty: 90 TABLET | Refills: 3 | Status: SHIPPED | OUTPATIENT
Start: 2025-07-23

## 2025-07-23 RX ORDER — OMEGA-3-ACID ETHYL ESTERS 1 G/1
1 CAPSULE, LIQUID FILLED ORAL 2 TIMES DAILY
Qty: 180 CAPSULE | Refills: 3 | Status: SHIPPED | OUTPATIENT
Start: 2025-07-23

## 2025-07-23 RX ORDER — PANTOPRAZOLE SODIUM 40 MG/1
40 TABLET, DELAYED RELEASE ORAL
Qty: 90 TABLET | Refills: 3 | OUTPATIENT
Start: 2025-07-23

## 2025-07-23 NOTE — PROGRESS NOTES
Infectious Disease Clinic  [facility]  Subjective:      Patient ID:Orestes Richards is a 59 y.o. male       Chief Complaint:   Chief Complaint   Patient presents with    Follow-up       History of Present Illness    A 59 y.o. 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  At his last clinic visit in March, he was started on cefadroxil for suppression with plans for an E-Visit which the patient did not complete. He reports his driveline has been doing well and has no complaints. He continues to experience fatigue and dizziness. He experiences dizziness characterized by both side-to-side and mild spinning sensations, occurring with position changes and quick head movements. He requires 1-2 minutes to regain equilibrium after these movements. He experienced a fall during physical therapy last week resulting in hospitalization. He reports adequate healing of his injuries.     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 Doxycycline x 30 days, followed by cefadroxil suppression.     Review of Systems   Constitutional: Negative for chills, decreased appetite, fever, malaise/fatigue, night sweats, weight gain and weight loss.   HENT:  Negative for congestion, ear pain, hearing loss, hoarse voice, sore throat and tinnitus.    Eyes:  Negative for blurred vision, redness and visual disturbance.   Cardiovascular:  Negative for chest pain, leg swelling and palpitations.   Respiratory:  Negative for cough, hemoptysis, shortness of breath, sputum production and wheezing.    Hematologic/Lymphatic: Negative for adenopathy. Does not bruise/bleed easily.   Skin:  Negative for dry skin, itching, rash and  suspicious lesions.   Musculoskeletal:  Negative for back pain, joint pain, myalgias and neck pain.   Gastrointestinal:  Negative for abdominal pain, constipation, diarrhea, heartburn, nausea and vomiting.   Genitourinary:  Negative for dysuria, flank pain, frequency, hematuria, hesitancy and urgency.   Neurological:  Positive for dizziness. Negative for headaches, numbness, paresthesias and weakness.   Psychiatric/Behavioral:  Negative for depression and memory loss. The patient does not have insomnia and is not nervous/anxious.      Objective:     Physical Exam  Vitals and nursing note reviewed. Exam conducted with a chaperone present.   Constitutional:       Appearance: Normal appearance.   HENT:      Head: Normocephalic and atraumatic.   Eyes:      General: No scleral icterus.        Right eye: No discharge.         Left eye: No discharge.      Conjunctiva/sclera: Conjunctivae normal.   Cardiovascular:      Heart sounds: No murmur heard.     Comments: VAD Humming sounds  Abdominal:      Comments: DLES clear yellow drainage. Hypergranulation tissue noted at the exit site. Bloody yellow secretions on the driveline dressing.    Musculoskeletal:         General: Normal range of motion.   Skin:     General: Skin is warm and dry.   Neurological:      General: No focal deficit present.      Mental Status: He is alert and oriented to person, place, and time.           Assessment:       ICD-10-CM ICD-9-CM   1. 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 Evaluated driveline site, observing increased irritation and purulent drainage compared to previous visit. Acknowledged persistent infection issues with LVAD HM 3, noting some patients require ongoing management with persistent driveline site issues, including ongoing infection and difficulty in  complete closure. Management as orders below.   Continue cefadroxil.  Orders Placed This Encounter    Aerobic culture    CULTURE, ANAEROBE    Gram stain   Clarified that silver-coated dressings can sometimes be applied incorrectly, which is a common occurrence even among healthcare professionals.  Discussed management plan with VAD physician and coordinator.   RTC in 1 month. Patient requested his visit as E-visit.     The total time for evaluation and management services performed on 7/23/25 was 30 minutes

## 2025-07-23 NOTE — PROGRESS NOTES
[facility]    07/23/2025 9:57 AM    Assessment/Plan:    Patient presents today with supratherapeutic INR.    Assessment of patient findings and chart review: no significant findings     Recommendation for patient's warfarin regimen: Lower dose today to 2.5mg then resume current maintenance dose    Recommend repeat INR Monday  _________________________________________________________________    Tim Richards (59 y.o.) is followed by the [facility].    Anticoagulation Summary  As of 7/23/2025      INR goal:  2.0-3.0   TTR:  68.9% (5.6 y)   INR used for dosing:  3.2 (7/23/2025)   Warfarin maintenance plan:  7.5 mg (5 mg x 1.5) every Tue, Thu; 5 mg (5 mg x 1) all other days   Weekly warfarin total:  40 mg   Plan last modified:  Radha Eugene, PharmD (7/1/2025)   Next INR check:  7/28/2025   Target end date:  --    Indications    LVAD (left ventricular assist device) present (Resolved) [Z95.811]  Anticoagulation monitoring  INR range 2-3 (Resolved) [Z79.01]                 Anticoagulation Episode Summary       INR check location:  Clinic Lab    Preferred lab:  --    Send INR reminders to:  [facility] COUMADIN LVAD    Comments:  LVAD/ [facility] Lab/Lab [place] Results:[phone] Acct# [id number] Phone: [phone] [phone]/ Bridge:NO(h/o bleeds) see 3/12 encounter for meter process          Anticoagulation Care Providers       Provider Role Specialty Phone number    Antonio Hadley Jr., MD [facility] [phone]

## 2025-07-23 NOTE — TELEPHONE ENCOUNTER
----- Message from EMILY Merrill sent at 7/23/2025  3:38 PM CDT -----  Regarding: VAD electric letter  Hi team,    Mr Richards is requesting a new letter of medical necessity be sent to his electric company. All information is the same from last year.    Thank you!

## 2025-07-23 NOTE — PROGRESS NOTES
Advanced Heart Failure and Transplantation Clinic Follow up.      Attending Physician: Isaiah Santizo MD.  The patient's last visit with me was on 2/23/2023.         HPI.  60 YO M w/ stage D HFrEF who was previously supported with a HM2 (implanted 3/8/2018 as DT-VAD) but required pump exchange (HM3 pump exchange 7/13/2022) for thrombosis of pump post COVID-19 PNA and AHRF. His post-op course following pump exchange was prolonged and complicated by worsening cardiogenic shock/RV failure requiring VA-ECMO. He also had recurrent VT as well as atrial flutter with RVR and is on chronic amiodarone and mexiletine. In June/July 2022 he was tapered of steroids (on for amiodarone hyperthyroid) due to concern for avascular necrosis of hip. 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       July 23, 2025: patient is having frequent speed drops on LVAD interrogation and his BP today is noticed to be low. HE is on norvasc 10 mg daily.     Review of Systems   Constitutional:  Negative for chills, diaphoresis and fever.   HENT:  Negative for nasal congestion, rhinorrhea and sore throat.    Eyes:  Negative for visual disturbance.   Cardiovascular:  Negative for chest pain.   Gastrointestinal:  Negative for abdominal pain, diarrhea, nausea and vomiting.   Genitourinary:  Negative for difficulty urinating, dysuria and hematuria.   Integumentary:  Negative for rash.   Neurological:  Negative for seizures, syncope and light-headedness.   Psychiatric/Behavioral:  Negative for agitation and hallucinations.         Past Medical History:   Diagnosis Date    A-fib     Anticoagulant long-term use      Atrial flutter 7/30/2022    CHF (congestive heart failure)     Class 1 obesity due to excess calories with serious comorbidity and body mass index (BMI) of 31.0 to 31.9 in adult     Class 1 obesity due to excess calories with serious comorbidity and body mass index (BMI) of 32.0 to 32.9 in adult     Dilated cardiomyopathy 1/10/2018    Disorder of kidney and ureter     CKD    Encounter for blood transfusion     Essential hypertension 8/28/2022    Gout     HTN (hypertension)     Hx of psychiatric care     ICD (implantable cardioverter-defibrillator) infection 7/1/2020    Psychiatric problem     Thyroid disease     Ventricular tachycardia (paroxysmal)         Past Surgical History:   Procedure Laterality Date    AORTIC VALVULOPLASTY N/A 07/13/2022    Procedure: REPAIR, AORTIC VALVE;  Surgeon: Yg Kaufman MD;  [facility];  Service: Cardiovascular;  Laterality: N/A;    APPLICATION OF WOUND VACUUM-ASSISTED CLOSURE DEVICE N/A 07/15/2022    Procedure: APPLICATION, WOUND VAC;  Surgeon: Yg Kaufman MD;  [facility];  Service: Cardiovascular;  Laterality: N/A;  50 x 5 cm     APPLICATION OF WOUND VACUUM-ASSISTED CLOSURE DEVICE Right 09/27/2022    Procedure: APPLICATION, WOUND VAC;  Surgeon: Kole Tabares MD;  [facility];  Service: Plastics;  Laterality: Right;    CARDIAC CATHETERIZATION  12/2012    CARDIAC DEFIBRILLATOR PLACEMENT Left     CRRT-D    CARDIAC VALVE REPLACEMENT  03/08/2018    LVAD Implant    COLONOSCOPY N/A 03/06/2018    Procedure: COLONOSCOPY;  Surgeon: Alonso Bone MD;  Location: [facility] (2ND FLR);  Service: Endoscopy;  Laterality: N/A;    COLONOSCOPY N/A 07/17/2019    Procedure: COLONOSCOPY;  Surgeon: Blane Valdez MD;  Location: [facility] ENDO (2ND FLR);  Service: Endoscopy;  Laterality: N/A;    COLONOSCOPY N/A 07/18/2019    Procedure: COLONOSCOPY;  Surgeon: Blane Valdez MD;  Location: [facility] ENDO (2ND FLR);  Service: Endoscopy;  Laterality: N/A;    CREATION  OF ILIOFEMORAL ARTERY BYPASS Right 09/27/2022    Procedure: CREATION, BYPASS, ARTERIAL, ILIAC TO FEMORAL WITH GRAFT;  Surgeon: Zach Hernández MD;  [facility];  Service: Peripheral Vascular;  Laterality: Right;    CREATION OF MUSCLE ROTATIONAL FLAP Right 09/27/2022    Procedure: CREATION, FLAP, MUSCLE ROTATION, SARTORIUS AND RECTUS FEMORIS;  Surgeon: Kole Tabares MD;  [facility];  Service: Plastics;  Laterality: Right;    ESOPHAGOGASTRODUODENOSCOPY N/A 07/17/2019    Procedure: EGD (ESOPHAGOGASTRODUODENOSCOPY);  Surgeon: Blane Valdez MD;  Location: [facility] ENDO (2ND FLR);  Service: Endoscopy;  Laterality: N/A;    ESOPHAGOGASTRODUODENOSCOPY N/A 07/18/2019    Procedure: EGD (ESOPHAGOGASTRODUODENOSCOPY);  Surgeon: Blane Valdez MD;  Location: [facility] ENDO (2ND FLR);  Service: Endoscopy;  Laterality: N/A;    FOREIGN BODY REMOVAL N/A 07/22/2022    Procedure: REMOVAL, FOREIGN BODY;  Surgeon: Yg Kaufman MD;  [facility];  Service: Cardiovascular;  Laterality: N/A;  LVAD Heartmate 2 drive line removal    INSERTION OF GRAFT TO PERICARDIUM N/A 07/15/2022    Procedure: INSERTION, GRAFT, PERICARDIUM;  Surgeon: Yg Kaufman MD;  [facility];  Service: Cardiovascular;  Laterality: N/A;    IRRIGATION OF MEDIASTINUM N/A 07/15/2022    Procedure: IRRIGATION, MEDIASTINUM;  Surgeon: Yg Kaufman MD;  [facility];  Service: Cardiovascular;  Laterality: N/A;    LYSIS OF ADHESIONS  07/13/2022    Procedure: LYSIS, ADHESIONS;  Surgeon: Yg Kaufman MD;  [facility];  Service: Cardiovascular;;    NONINVASIVE CARDIAC ELECTROPHYSIOLOGY STUDY N/A 10/18/2019    Procedure: CARDIAC ELECTROPHYSIOLOGY STUDY, NONINVASIVE;  Surgeon: Raz Wagner MD;  Location: [facility] EP LAB;  Service: Cardiology;  Laterality: N/A;  VT, DFTs, MDT CRTD in situ, LVAD, anes, MB, 3098    REPLACEMENT OF IMPLANTABLE CARDIOVERTER-DEFIBRILLATOR (ICD) GENERATOR N/A  03/09/2020    Procedure: REPLACEMENT, ICD GENERATOR;  Surgeon: Harry Yun MD;  Location: [facility] EP LAB;  Service: Cardiology;  Laterality: N/A;  VT, ICD Gen Change and Lead Revision, MDT, MAC, DM,3 Prep    REPLACEMENT OF LEFT VENTRICULAR ASSIST DEVICE (LVAD)  07/13/2022    Procedure: REPLACEMENT, LVAD;  Surgeon: Yg Kaufman MD;  [address]  Service: Cardiovascular;;    REPLACEMENT OF PUMP N/A 07/13/2022    Procedure: REPLACEMENT, PUMP;  Surgeon: gY Kaufman MD;  [facility];  Service: Cardiovascular;  Laterality: N/A;  LVAD pump exchange  EXPLANATION OF HEATMATE 2  IMPLANTATION OF HEARTMATE 3  IMPLANTATION OF 8MM CHIMNEY GRAFT TO RFA  INITIATION OF ECMO  TEMPORARY CLOSURE OF CHEST    REVISION OF IMPLANTABLE CARDIOVERTER-DEFIBRILLATOR (ICD) ELECTRODE LEAD PLACEMENT N/A 03/09/2020    Procedure: REVISION, INSERTION, ELECTRODE LEAD, ICD;  Surgeon: Harry Yun MD;  Location: [facility] EP LAB;  Service: Cardiology;  Laterality: N/A;  VT, ICD Gen Change and Lead Revision, MDT, MAC, DM,3 Prep    RIGHT HEART CATHETERIZATION Right 09/08/2023    Procedure: INSERTION, CATHETER, RIGHT HEART;  Surgeon: Gaurav Rodriguez MD;  Location: [facility] CATH LAB;  Service: Cardiology;  Laterality: Right;    STERNAL WOUND CLOSURE N/A 07/14/2022    Procedure: CLOSURE, WOUND, STERNUM;  Surgeon: Yg Kuafman MD;  Location: [address];  Service: Cardiovascular;  Laterality: N/A;  temporary closure  evacuation of hematoma    STERNAL WOUND CLOSURE N/A 07/15/2022    Procedure: CLOSURE, WOUND, STERNUM;  Surgeon: Yg Kaufman MD;  [facility];  Service: Cardiovascular;  Laterality: N/A;    TRACHEOSTOMY N/A 08/04/2022    Procedure: CREATION, TRACHEOSTOMY;  Surgeon: Germain Holt MD;  [facility];  Service: General;  Laterality: N/A;    TREATMENT OF CARDIAC ARRHYTHMIA  10/18/2019    Procedure: Cardioversion or Defibrillation;  Surgeon: Raz Wagner MD;  Location: [facility]  EP LAB;  Service: Cardiology;;        Family History   Problem Relation Name Age of Onset    Hypertension Father Kolby Richards     Diabetes Father Kolby Richards     Coronary artery disease Father Kolby Richards     Heart disease Father Kolby Richards         CHF    No Known Problems Mother      Cancer Sister Kelly Forde 54        breast CA    No Known Problems Brother      Anxiety disorder Neg Hx      Depression Neg Hx      Dementia Neg Hx      Bipolar disorder Neg Hx      Suicide Neg Hx          Review of patient's allergies indicates:   Allergen Reactions    Lisinopril Anaphylaxis    Hydralazine     Hydralazine analogues      Chronic constipation, impotence, dizziness        Current Outpatient Medications   Medication Instructions    ALPRAZolam (XANAX) 1 mg, 2 times daily PRN    amiodarone (PACERONE) 400 mg, Oral, Daily    amLODIPine (NORVASC) 10 mg, Oral, Daily    busPIRone (BUSPAR) 5 mg, Oral, 2 times daily    cefadroxil (DURICEF) 500 mg, Oral, Every 12 hours    ferrous gluconate 324 mg, Oral, 2 times daily with meals    levothyroxine (SYNTHROID) 125 mcg, Oral, Before breakfast    magnesium oxide (MAG-OX) 400 mg, Oral, Daily    mexiletine (MEXITIL) 250 mg, Oral, Every 8 hours    omega-3 acid ethyl esters (LOVAZA) 2 g, Oral, 2 times daily    pantoprazole (PROTONIX) 40 mg, Oral, With lunch    potassium chloride SA (K-DUR,KLOR-CON) 20 MEQ tablet Take orally daily when taking torsemide    torsemide (DEMADEX) 20 MG Tab Take 1 tablets (20mg) on Mondays.    warfarin (COUMADIN) 5 MG tablet Take 1 tablet (5mg) daily, except 1.5 tablets (7.5mg) on Tuesdays and Thursdays    zolpidem (AMBIEN CR) 12.5 mg, Oral, Nightly PRN        Vitals:    07/23/25 1109   BP: (!) 58/0   Temp:         Wt Readings from Last 3 Encounters:   07/23/25 104.3 kg (230 lb)   07/23/25 106.9 kg (235 lb 9.6 oz)   07/16/25 105.3 kg (232 lb 0.6 oz)     Temp Readings from Last 3 Encounters:   07/23/25 97.8 °F (36.6 °C) (Oral)   07/16/25 97.5 °F (36.4 °C)  "(Axillary)   04/23/25 97.1 °F (36.2 °C) (Oral)     BP Readings from Last 3 Encounters:   07/23/25 (!) 58/0   07/16/25 (!) 88/0   04/23/25 101/67     Pulse Readings from Last 3 Encounters:   07/16/25 63   06/04/25 73   04/23/25 (!) 55        Body mass index is 30.34 kg/m². Estimated body surface area is 2.32 meters squared as calculated from the following:    Height as of this encounter: 6' 1" (1.854 m).    Weight as of this encounter: 104.3 kg (230 lb).     Physical Exam  Constitutional:       Appearance: He is well-developed.   HENT:      Head: Normocephalic and atraumatic.      Right Ear: External ear normal.      Left Ear: External ear normal.   Eyes:      Conjunctiva/sclera: Conjunctivae normal.      Pupils: Pupils are equal, round, and reactive to light.   Neck:      Vascular: No hepatojugular reflux or JVD.   Cardiovascular:      Rate and Rhythm: Normal rate and regular rhythm.      Pulses: Intact distal pulses.      Heart sounds: No murmur heard.     No friction rub. No gallop.      Comments: Normal LVAD hum  Pulmonary:      Effort: Pulmonary effort is normal.      Breath sounds: Normal breath sounds.   Abdominal:      General: Bowel sounds are normal. There is no distension.      Palpations: Abdomen is soft.      Tenderness: There is no abdominal tenderness. There is no guarding or rebound.   Musculoskeletal:      Cervical back: Normal range of motion and neck supple.      Right lower leg: No edema.      Left lower leg: No edema.   Neurological:      Mental Status: He is alert and oriented to person, place, and time.          Lab Results   Component Value Date     (H) 07/23/2025     07/23/2025     (L) 03/08/2025    K 4.2 07/23/2025    K 3.9 03/08/2025    MG 1.9 07/23/2025     07/23/2025     03/08/2025    CO2 20 (L) 07/23/2025    CO2 24 03/08/2025    BUN 18 07/23/2025    CREATININE 2.3 (H) 07/23/2025    GLU 69 (L) 07/23/2025     03/08/2025    HGBA1C 5.4 04/26/2021    AST " 41 07/23/2025     (H) 03/07/2025    ALT 52 (H) 07/23/2025     (H) 03/07/2025    ALBUMIN 3.9 07/23/2025    ALBUMIN 3.6 03/07/2025    PROT 7.7 07/23/2025    PROT 8.0 03/07/2025    BILITOT 0.5 07/23/2025    BILITOT 0.6 03/07/2025    WBC 4.55 07/23/2025    HGB 13.0 (L) 07/23/2025    HGB 13.5 (L) 03/08/2025    HCT 42.4 07/23/2025    HCT 41 07/16/2025     07/23/2025     03/08/2025    INR 3.2 (H) 07/23/2025    INR 2.6 (H) 03/20/2025    INR 6.6 11/12/2024    INR 3.1 (H) 09/24/2022     07/23/2025     03/08/2025    TSH 0.538 07/23/2025    TSH 8.276 (H) 03/06/2025    CHOL 222 (H) 07/23/2025    CHOL 245 (H) 11/06/2024    HDL 66 07/23/2025    LDLCALC 128.0 07/23/2025    TRIG 140 07/23/2025    TRIG 75 11/06/2024    K2KYOWU 8.4 07/25/2024    FREET4 1.35 04/20/2025       Results for orders placed during the hospital encounter of 04/20/25    Echo    Interpretation Summary    Left Ventricle: The left ventricle is at the upper limits of normal in size. Ventricular mass is normal. Mildly increased wall thickness. Mild septal thickening. Severe global hypokinesis present. There is severely reduced systolic function. Ejection fraction is approximately 20% upper teens to low 20s).    Right Ventricle: Right ventricle was not well visualized due to poor acoustic window. The right ventricle has moderate enlargement. Wall thickness is normal. Systolic function is mild-moderately reduced.    Left Atrium: Moderately dilated    Pulmonary Artery: The estimated pulmonary artery systolic pressure is 25 mmHg plus RAP.    LVAD: The aortic valve does not open. The ventricular septum is at midline.III 6300.        Results for orders placed during the hospital encounter of 09/08/23    Cardiac catheterization    Conclusion    The estimated blood loss was <50 mL.    Procedure: Right Heart Catheterization  Physician: Gaurav Rodriguez MD    Access: Right IJ  Analgesia: 2% Lidocaine    Measurements obtained with HM3  LVAD set to 6300 RPM    Findings:    RA: 7  RV: 40/3, EDP 7  PA: 36/16, mean 22  PCWP: 13    Saturation:  Arterial: 97 %  PA: 68 %    Lu CI/CO: 2.3/5.1    Conclusions:  Normal intracardiac filling pressures  Low normal CI/CO    Advised to reduce PO lasix to twice weekly as opposed to daily. Repeat labs in 1 week.      The procedure log was documented by No documenter listed and verified by Gaurav Rodriguez MD.    Date: 9/8/2023  Time: 10:00 AM         Assessment and Plan:  LVAD (left ventricular assist device) present    Chronic combined systolic and diastolic heart failure    LVAD (left ventricular assist device) present [Z95.811]    VT (ventricular tachycardia)        Chronic cardiomyopathy and systolic HF, NYHA class 2, stage D, s/p LVAD.  Hemodynamic status: warm, euvolemic.  MAP 58. Plan to stop norvasc. Patient will comes next week for check with LVAD coordinators to see if speed drops and BP are improved.    Antithrombotic therapy and target anticoagulation: INR/Prothrombin Time 3.2. Adjustment to warfarin dose per anticoagulation clinic.      LVAD related complications:   -Bleeding (gastrointestinal, epistaxis, etc): no, hgb 13.  -RV failure: no, normal JVP.  is stable and in the setting of renal insufficiency.  -Thrombotic events and LDH:  none, ldh 211.  -DLES and Infection: no.      GDMT reviewed. Not on ACEI/ARB/ARNI due to WAGNER. Not on bblockers. Today he is hypotensive on norvasc.

## 2025-07-23 NOTE — LETTER
July 25, 2025        Nader Chiu  [address] [place], [place] [address]  [address]  [place] [address]  Phone: [phone]  Fax: [phone]     Nader Chiu  [address] [place] [address]  Phone: [phone]  Fax: [phone]             [facility]  [address]  [place] [address]  Phone: [phone]   Patient: Tim Richards   MR Number: [id number]   YOB: 1965   Date of Visit: 7/23/2025       Dear Dr. Nader Chiu, Nader Chiu    Thank you for referring Tim Richards to me for evaluation. Attached you will find relevant portions of my assessment and plan of care.    If you have questions, please do not hesitate to call me. I look forward to following Tim Richards along with you.    Sincerely,    Isaiah Santizo MD    Enclosure    If you would like to receive this communication electronically, please contact [email or web address] or [phone] to request [facility] access.    [employer] is a tool which provides read-only access to select patient information with whom you have a relationship. Its easy to use and provides real time access to review your patients record including encounter summaries, notes, results, and demographic information.    If you feel you have received this communication in error or would no longer like to receive these types of communications, please e-mail [email or web address]

## 2025-07-23 NOTE — PROCEDURES
Tim Richards is a 59 y.o.   male patient, who presents for a 6 minute walk test ordered by MD Sahe.  The diagnosis is Left Ventricular Assist Device; Cardiomyopathy.  The patient's BMI is 31.1 kg/m2.  Predicted distance (lower limit of normal) is 410.01 meters.      Test Results:    The test was completed with stops.  The patient stopped 1 time for a total of 100 seconds.  The total time walked was 260 seconds.  During walking, the patient reported:  Dyspnea, Leg/hip pain.  The patient used no assistive devices during testing.     07/23/2025---------Distance: 198.12 meters (650 feet)     O2 Sat % Supplemental Oxygen Heart Rate Blood Pressure Renan Scale   Pre-exercise  (Resting) 95 % Room Air 30 bpm Unable to obtain 5-6   During Exercise 95 % Room Air 97 bpm Unable to obtain 4   Post-exercise  (Recovery) 99 % Room Air  75 bpm       Recovery Time: 127 seconds                        The patient walked the first 15 feet in 5.00 seconds.    Performing nurse/tech: Emiliano MACK      PREVIOUS STUDY:   07/25/2024---------Distance: 182.88 meters (600 feet)       O2 Sat % Supplemental Oxygen Heart Rate Blood Pressure Renan Scale   Pre-exercise  (Resting) 97 % Room Air 30 bpm Unable to obtain 2   During Exercise 96 % Room Air 30 bpm Unable to obtain 7-8   Post-exercise  (Recovery) 97 % Room Air  30 bpm          Recovery Time:  31 seconds                        The patient walked the first 15 feet in 5.21 seconds.       CLINICAL INTERPRETATION:  Six minute walk distance is 198.12 meters (650 feet) with heavy dyspnea.  During exercise, there was no desaturation while breathing room air.  Heart rate increased significantly with walking.  Bradycardia was present prior to exercise.  The patient reported non-pulmonary symptoms during exercise.  Significant exercise impairment is likely due to cardiovascular causes and subjective symptoms.  The patient did complete the study, walking 260 seconds of the 360 second test.  Since  the previous study in July 2024, exercise capacity is unchanged.  Based upon age and body mass index, exercise capacity is less than predicted.
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
## 2025-07-24 ENCOUNTER — DOCUMENTATION ONLY (OUTPATIENT)
Dept: CARDIOTHORACIC SURGERY | Facility: CLINIC | Age: 60
End: 2025-07-24
Payer: MEDICARE

## 2025-07-24 LAB
GRAM STN SPEC: NORMAL
GRAM STN SPEC: NORMAL

## 2025-07-24 NOTE — TELEPHONE ENCOUNTER
----- Message from EMILY Merrill sent at 7/23/2025 12:12 PM CDT -----  Regarding: Keily Daniel,    I am one of the VAD Coordinators for Mr. Richards. We saw him in clinic today and he reports he is not sleeping well. Even with the ambien he reports he is getting 3-4hrs/night. I was wondering if you could reach out to him about adjusting the dose or perhaps trying a different sleep aide. We also discussed sleep hygiene as an aide to better sleep.    Thank you,  Desirae

## 2025-07-24 NOTE — PROGRESS NOTES
LATE ENTRY FOR 7/23/2025:  Per LVAD Coordinator, Desirae Alexander, RN, pt presented for 36 month follow-up and verbalized consent and willingness to continue to participate with the INTERMACS registry.      Patient completed the EQ-5D quality of life questionnaire, KCCQ, and the Trail Making neurocognitive test in 132 seconds.

## 2025-07-25 ENCOUNTER — RESULTS FOLLOW-UP (OUTPATIENT)
Dept: INFECTIOUS DISEASES | Facility: CLINIC | Age: 60
End: 2025-07-25
Payer: MEDICARE

## 2025-07-25 DIAGNOSIS — T82.7XXA INFECTION ASSOCIATED WITH DRIVELINE OF VENTRICULAR ASSIST DEVICE: Primary | ICD-10-CM

## 2025-07-25 LAB — BACTERIA SPEC ANAEROBE CULT: NORMAL

## 2025-07-26 ENCOUNTER — E-VISIT (OUTPATIENT)
Dept: FAMILY MEDICINE | Facility: CLINIC | Age: 60
End: 2025-07-26
Payer: MEDICARE

## 2025-07-26 DIAGNOSIS — F40.9 INSOMNIA DUE TO ANXIETY AND FEAR: Primary | ICD-10-CM

## 2025-07-26 DIAGNOSIS — F51.05 INSOMNIA DUE TO ANXIETY AND FEAR: Primary | ICD-10-CM

## 2025-07-26 LAB — BACTERIA SPEC AEROBE CULT: ABNORMAL

## 2025-07-26 NOTE — PROCEDURES
7/23/2025    11:36 AM 7/16/2025    11:00 AM 7/16/2025    10:00 AM 7/16/2025     9:00 AM 7/16/2025     8:00 AM 7/16/2025     7:00 AM 7/16/2025     5:01 AM   TXP SACHI INTERROGATIONS   Type HeartMate3         Flow 4.6         Speed 6300         PI 4.2         Power (Jackson) 5.3         LSL 5900         Pulsatility No Pulse Intermittent pulse Intermittent pulse Intermittent pulse Intermittent pulse Intermittent pulse Intermittent pulse   }

## 2025-07-27 RX ORDER — ALPRAZOLAM 1 MG/1
1 TABLET ORAL 2 TIMES DAILY PRN
Qty: 30 TABLET | Refills: 0 | Status: SHIPPED | OUTPATIENT
Start: 2025-07-27

## 2025-07-28 ENCOUNTER — TELEPHONE (OUTPATIENT)
Dept: TRANSPLANT | Facility: CLINIC | Age: 60
End: 2025-07-28
Payer: MEDICARE

## 2025-07-28 ENCOUNTER — PATIENT MESSAGE (OUTPATIENT)
Dept: HOME HEALTH SERVICES | Facility: CLINIC | Age: 60
End: 2025-07-28

## 2025-07-28 DIAGNOSIS — Z95.811 LVAD (LEFT VENTRICULAR ASSIST DEVICE) PRESENT: Chronic | ICD-10-CM

## 2025-07-28 DIAGNOSIS — F43.10 POST TRAUMATIC STRESS DISORDER (PTSD): Primary | ICD-10-CM

## 2025-07-28 NOTE — PROGRESS NOTES
Patient ID: Tim Richards is a 59 y.o. male.        E-Visit Time Tracking:   Day 1 Time (in minutes): 11  Total Time (in minutes): 11      Chief Complaint: Insomnia (Entered automatically based on patient selection in [employer].)      The patient initiated a request through [employer] on 7/26/2025 for evaluation and management with a chief complaint of Insomnia (Entered automatically based on patient selection in [employer].)     I evaluated the questionnaire submission on 07/27/2025.    Ohs Peq Evisit Insomnia    7/27/2025  5:48 PM CDT - Filed by Patient   Do you agree to participate in an E-Visit? Yes   If you have any of the following symptoms, please go to the nearest emergency room or call 911. I acknowledge   Choose the state of your primary residence [place]   What is the main issue you would like addressed today? Insomnia   In the past two weeks, how would you rate your difficulty with falling asleep? (None, Mild, Moderate, Severe, Very Severe) Very Severe   In the past two weeks, how would you rate your difficulty with staying asleep? (None, Mild, Moderate, Severe, Very Severe) Severe   How often do you wake up early? (Every day, Most days, Some days, Rarely, Never) Some days   How many days a week do you have a problem with your sleep? (0-1, 2, 3, 4, 5-7) 5-7   How would you rate the quality of your sleep? (Very good, Good, Average, Poor, Very Poor) Poor   Has anyone noticed you stop breathing during sleep? Not sure   Do you have any of the following symptoms? (Anxiety, Depression, Fatigue, Mind races, Nightmares, Pain waking you, Restless legs, Snoring, Waking up to urinate, Waking up gasping, Other, None) Anxiety;  Depression;  Fatigue;  Mind races with many thoughts   How long have you had insomnia? (Less than a month, 1-2 months, 3-6 months, 7-12 months, More than a year) 7-12 months   Before your insomnia problem started, did you have any of the following? (A loss or traumatic event, Anxiety,  Depression, Loss of a loved one, More or worse stress, New medication, New work time, Starting or ending a relationship, Other, None) Anxiety;  Depression;  More or worse stress   I would like to address: (Medication for Insomnia, Other concerns related to Insomnia) Other concerns related to Insomnia   Please describe your symptoms. Cannot fall asleep at night. My clock is reversed.   On a scale of 1-10, where 10 is the worst you can imagine, how severe are your symptoms? (range: 1 - 10) 9   Have you had these symptoms before? Yes   How long have you been having these symptoms? (Just today, For a few days, For a week, For one to four weeks, For more than a month) More than a month   What helps with your symptoms? Shutting down my continuous thinking   What makes your symptoms feel worse? Not sure   Are these symptoms related to a condition that you currently have? (Yes, No, Not sure) Not sure   When were you last seen for this condition?    Please describe any probable cause for your symptoms. Clock reversed. Dont feel tired when i lay in bed at night   Provide any information you feel is important to your history not asked above I can fall sleep in the living room sitting up, more than when i lay in the bed. Mind races when im in bed.   Please attach any relevant images or files    Are you able to take your vitals? No         Encounter Diagnosis   Name Primary?    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     Medications Ordered This Encounter   Medications    ALPRAZolam (XANAX) 1 MG tablet     Sig: Take 1 tablet (1 mg total) by mouth 2 (two) times daily as needed for Insomnia (and anxiety).     Dispense:  30 tablet     Refill:  0        No follow-ups on file.

## 2025-07-28 NOTE — TELEPHONE ENCOUNTER
Kelly called to reschedule appt today and asked about sleep aids.  I explained Bridget is setting him up to see Palliative care and they can help with him not sleeping.  Kelly said thank you

## 2025-07-29 ENCOUNTER — LAB VISIT (OUTPATIENT)
Dept: LAB | Facility: HOSPITAL | Age: 60
End: 2025-07-29
Attending: INTERNAL MEDICINE
Payer: MEDICARE

## 2025-07-29 ENCOUNTER — PATIENT MESSAGE (OUTPATIENT)
Dept: TRANSPLANT | Facility: CLINIC | Age: 60
End: 2025-07-29
Payer: MEDICARE

## 2025-07-29 ENCOUNTER — TELEPHONE (OUTPATIENT)
Dept: PALLIATIVE MEDICINE | Facility: CLINIC | Age: 60
End: 2025-07-29
Payer: MEDICARE

## 2025-07-29 ENCOUNTER — ANTI-COAG VISIT (OUTPATIENT)
Dept: CARDIOLOGY | Facility: CLINIC | Age: 60
End: 2025-07-29
Payer: MEDICARE

## 2025-07-29 DIAGNOSIS — Z95.811 HEART REPLACED BY HEART ASSIST DEVICE: ICD-10-CM

## 2025-07-29 LAB
INR PPP: 4.5 (ref 0.8–1.2)
PROTHROMBIN TIME: 45.5 SECONDS (ref 9–12.5)

## 2025-07-29 PROCEDURE — 85610 PROTHROMBIN TIME: CPT

## 2025-07-29 PROCEDURE — 87186 SC STD MICRODIL/AGAR DIL: CPT | Mod: 59 | Performed by: INTERNAL MEDICINE

## 2025-07-29 PROCEDURE — 36415 COLL VENOUS BLD VENIPUNCTURE: CPT

## 2025-07-29 PROCEDURE — 93793 ANTICOAG MGMT PT WARFARIN: CPT | Mod: S$GLB,,,

## 2025-07-29 NOTE — PROGRESS NOTES
Pt confirmed correct dose per calendar. He has not had much of an appetite, eats about once daily. Elevate prior to INR draw. Thursday-- boost or ensure during admit. Last week diarrhea 2-3 days during admit to hospital.

## 2025-07-29 NOTE — PROGRESS NOTES
[facility]    2025 3:39 PM    Assessment/Plan:    Patient presents today with supratherapeutic INR.    Assessment of patient findings and chart review: reports decreased appetite as well as diarrhea last week     Recommendation for patient's warfarin regimen: Hold dose today    Recommend repeat INR tomorrow   _________________________________________________________________    Tim Richards (59 y.o.) is followed by the [facility].    Anticoagulation Summary  As of 2025      INR goal:  2.0-3.0   TTR:  68.7% (5.6 y)   INR used for dosin.5 (2025)   Warfarin maintenance plan:  7.5 mg (5 mg x 1.5) every Tue, u; 5 mg (5 mg x 1) all other days   Weekly warfarin total:  40 mg   Plan last modified:  Radha Eugene, PharmD (2025)   Next INR check:  2025   Target end date:  --    Indications    LVAD (left ventricular assist device) present (Resolved) [Z95.811]  Anticoagulation monitoring  INR range 2-3 (Resolved) [Z79.01]                 Anticoagulation Episode Summary       INR check location:  Clinic Lab    Preferred lab:  --    Send INR reminders to:  [facility] COUMADIN LVAD    Comments:  LVAD/ [facility] [place] Results:[phone] [id number] Phone: [phone] [phone]/ Bridge:NO(h/o bleeds) see 3/12 encounter for meter process          Anticoagulation Care Providers       Provider Role Specialty Phone number    Antonio Hadley Jr., MD [facility] Cardiology [phone]

## 2025-07-30 ENCOUNTER — ANTI-COAG VISIT (OUTPATIENT)
Dept: CARDIOLOGY | Facility: CLINIC | Age: 60
End: 2025-07-30
Payer: MEDICARE

## 2025-07-30 ENCOUNTER — LAB VISIT (OUTPATIENT)
Dept: LAB | Facility: HOSPITAL | Age: 60
End: 2025-07-30
Attending: INTERNAL MEDICINE
Payer: MEDICARE

## 2025-07-30 DIAGNOSIS — Z95.811 HEART REPLACED BY HEART ASSIST DEVICE: ICD-10-CM

## 2025-07-30 LAB
INR PPP: 4.4 (ref 0.8–1.2)
PROTHROMBIN TIME: 44.1 SECONDS (ref 9–12.5)

## 2025-07-30 PROCEDURE — 85610 PROTHROMBIN TIME: CPT

## 2025-07-30 PROCEDURE — 36415 COLL VENOUS BLD VENIPUNCTURE: CPT

## 2025-07-30 NOTE — PROGRESS NOTES
Patient reports he may have taken his regular dose of Coumadin 7/29/2025 and didn't hold his Warfarin as advised, no other changes reported.

## 2025-07-30 NOTE — PROGRESS NOTES
[facility]    2025 2:47 PM    Assessment/Plan:    Patient presents today with supratherapeutic INR.    Assessment of patient findings and chart review: thinks he may have taken dose last night instead of holding     Recommendation for patient's warfarin regimen: Hold dose today    Recommend repeat INR Friday  _________________________________________________________________    Tim Richards (59 y.o.) is followed by the [facility].    Anticoagulation Summary  As of 2025      INR goal:  2.0-3.0   TTR:  68.6% (5.6 y)   INR used for dosin.4 (2025)   Warfarin maintenance plan:  7.5 mg (5 mg x 1.5) every Tue, Thu; 5 mg (5 mg x 1) all other days   Weekly warfarin total:  40 mg   Plan last modified:  Radha Eugene, PharmD (2025)   Next INR check:  2025   Target end date:  --    Indications    LVAD (left ventricular assist device) present (Resolved) [Z95.811]  Anticoagulation monitoring  INR range 2-3 (Resolved) [Z79.01]                 Anticoagulation Episode Summary       INR check location:  Clinic Lab    Preferred lab:  --    Send INR reminders to:  [facility] COUMADIN LVAD    Comments:  LVAD/ [facility]Lab [place] Results:[phone] Acct# [id number] Phone: [phone] [phone]/ Bridge:NO(h/o bleeds) see 3/12 encounter for meter process          Anticoagulation Care Providers       Provider Role Specialty Phone number    Antonio Hadley Jr., MD [facility] Cardiology [phone]

## 2025-08-01 ENCOUNTER — CLINICAL SUPPORT (OUTPATIENT)
Dept: TRANSPLANT | Facility: CLINIC | Age: 60
End: 2025-08-01
Payer: MEDICARE

## 2025-08-01 ENCOUNTER — LAB VISIT (OUTPATIENT)
Dept: LAB | Facility: HOSPITAL | Age: 60
End: 2025-08-01
Attending: INTERNAL MEDICINE
Payer: MEDICARE

## 2025-08-01 DIAGNOSIS — Z95.811 LVAD (LEFT VENTRICULAR ASSIST DEVICE) PRESENT: Primary | ICD-10-CM

## 2025-08-01 DIAGNOSIS — Z95.811 HEART REPLACED BY HEART ASSIST DEVICE: ICD-10-CM

## 2025-08-01 LAB
INR PPP: 3.1 (ref 0.8–1.2)
PROTHROMBIN TIME: 31.1 SECONDS (ref 9–12.5)

## 2025-08-01 PROCEDURE — 85610 PROTHROMBIN TIME: CPT

## 2025-08-01 PROCEDURE — 36415 COLL VENOUS BLD VENIPUNCTURE: CPT

## 2025-08-01 NOTE — PROGRESS NOTES
Patient seen for doppler check and VAD interrogation after norvasc was stopped in clinic last week. Doppler 82/0mmhg, patient is non-pulsatile. Continues to have frequent PI events w/ speed drops. Message sent to MD regarding speed drops.

## 2025-08-04 ENCOUNTER — TELEPHONE (OUTPATIENT)
Dept: INFECTIOUS DISEASES | Facility: CLINIC | Age: 60
End: 2025-08-04
Payer: MEDICARE

## 2025-08-04 ENCOUNTER — ANTI-COAG VISIT (OUTPATIENT)
Dept: CARDIOLOGY | Facility: CLINIC | Age: 60
End: 2025-08-04
Payer: MEDICARE

## 2025-08-04 DIAGNOSIS — T82.7XXA INFECTION ASSOCIATED WITH DRIVELINE OF VENTRICULAR ASSIST DEVICE: Primary | ICD-10-CM

## 2025-08-04 PROCEDURE — 93793 ANTICOAG MGMT PT WARFARIN: CPT | Mod: S$GLB,,,

## 2025-08-04 NOTE — PROGRESS NOTES
Pt reports he held Warfarin on 8/1/25 on his own, took Warfarin 2.5mg 7/31/25, 8/2/25 and 8/3/2025 on his own, took correct Warfarin dose all other days, no other changes reported.

## 2025-08-04 NOTE — PROGRESS NOTES
[facility]    08/04/2025 8:56 AM    Assessment/Plan:    Patient presents today with supratherapeutic INR.    Assessment of patient findings and chart review: INR drawn 3 days ago just above goal, but patient reports taking dose lower than last advised x3 days so expect now INR will be low    Recommendation for patient's warfarin regimen: follow plan per calendar    Recommend repeat INR tomorrow  _________________________________________________________________    Tim Richards (59 y.o.) is followed by the [facility].    Anticoagulation Summary  As of 8/4/2025      INR goal:  2.0-3.0   TTR:  68.6% (5.6 y)   INR used for dosing:  3.1 (8/1/2025)   Warfarin maintenance plan:  7.5 mg (5 mg x 1.5) every Tue, Thu; 5 mg (5 mg x 1) all other days   Weekly warfarin total:  40 mg   Plan last modified:  Radha Eugene, PharmD (7/1/2025)   Next INR check:  8/5/2025   Target end date:  --    Indications    LVAD (left ventricular assist device) present (Resolved) [Z95.811]  Anticoagulation monitoring  INR range 2-3 (Resolved) [Z79.01]                 Anticoagulation Episode Summary       INR check location:  Clinic Lab    Preferred lab:  --    Send INR reminders to:  [facility] COUMADIN LVAD    Comments:  LVAD/ [facility] [place] Results:[phone] Acct# [id number] Phone: [phone] [phone]/ Bridge:NO(h/o bleeds) see 3/12 encounter for meter process          Anticoagulation Care Providers       Provider Role Specialty Phone number    Antonio Hadley Jr., MD [facility] [phone]

## 2025-08-05 ENCOUNTER — ANTI-COAG VISIT (OUTPATIENT)
Dept: CARDIOLOGY | Facility: CLINIC | Age: 60
End: 2025-08-05
Payer: MEDICARE

## 2025-08-05 ENCOUNTER — TELEPHONE (OUTPATIENT)
Dept: TRANSPLANT | Facility: CLINIC | Age: 60
End: 2025-08-05
Payer: MEDICARE

## 2025-08-05 ENCOUNTER — LAB VISIT (OUTPATIENT)
Dept: LAB | Facility: HOSPITAL | Age: 60
End: 2025-08-05
Attending: INTERNAL MEDICINE
Payer: MEDICARE

## 2025-08-05 DIAGNOSIS — Z95.811 HEART REPLACED BY HEART ASSIST DEVICE: ICD-10-CM

## 2025-08-05 LAB
INR PPP: 2.3 (ref 0.8–1.2)
PROTHROMBIN TIME: 24 SECONDS (ref 9–12.5)

## 2025-08-05 PROCEDURE — 36415 COLL VENOUS BLD VENIPUNCTURE: CPT

## 2025-08-05 PROCEDURE — 85610 PROTHROMBIN TIME: CPT

## 2025-08-05 NOTE — TELEPHONE ENCOUNTER
Called to report that he has continued to feel dizzy when he stands. Had a fall while going to lab for INR check, denies LOC and hitting his head. Requested urgent echo appointment. Advised patient that if symptoms worsen to report to ER. Patient verbalized understanding.      None

## 2025-08-05 NOTE — PROGRESS NOTES
[facility]    2025 4:20 PM    Assessment/Plan:    Patient presents today with therapeutic INR.    Assessment of patient findings and chart review: none    Recommendation for patient's warfarin regimen: Continue current maintenance dose    Recommend repeat INR in 1 week  _________________________________________________________________    Tim Richards (59 y.o.) is followed by the [employer].    Anticoagulation Summary  As of 2025      INR goal:  2.0-3.0   TTR:  68.6% (5.6 y)   INR used for dosin.3 (2025)   Warfarin maintenance plan:  7.5 mg (5 mg x 1.5) every Tue, Thu; 5 mg (5 mg x 1) all other days   Weekly warfarin total:  40 mg   Plan last modified:  Radha Eugene, PharmD (2025)   Next INR check:  2025   Target end date:  --    Indications    LVAD (left ventricular assist device) present (Resolved) [Z95.811]  Anticoagulation monitoring  INR range 2-3 (Resolved) [Z79.01]                 Anticoagulation Episode Summary       INR check location:  Clinic Lab    Preferred lab:  --    Send INR reminders to:  [facility] LV[facility]    Comments:  LVAD/ [facility] - [facility] [place] [facility]/Lab [place] Results:[phone] Acct# [id number] Phone: [phone] [phone]/ Bridge:NO(h/o bleeds) see 3/12 encounter for meter process          Anticoagulation Care Providers       Provider Role Specialty Phone number    Antonio Hadley Jr., MD Responsible Cardiology [phone]              Radha Eugene, PharmD, [facility]  Clinical Pharmacist - [facility]  Phone: [phone] or Epic Secure Messaging

## 2025-08-06 ENCOUNTER — PATIENT MESSAGE (OUTPATIENT)
Dept: FAMILY MEDICINE | Facility: CLINIC | Age: 60
End: 2025-08-06
Payer: MEDICARE

## 2025-08-06 ENCOUNTER — RESULTS FOLLOW-UP (OUTPATIENT)
Dept: TRANSPLANT | Facility: CLINIC | Age: 60
End: 2025-08-06
Payer: MEDICARE

## 2025-08-06 ENCOUNTER — HOSPITAL ENCOUNTER (OUTPATIENT)
Dept: CARDIOLOGY | Facility: HOSPITAL | Age: 60
Discharge: HOME | End: 2025-08-06
Attending: INTERNAL MEDICINE
Payer: MEDICARE

## 2025-08-06 VITALS — HEART RATE: 66 BPM | WEIGHT: 229.25 LBS | BODY MASS INDEX: 32.1 KG/M2 | HEIGHT: 71 IN

## 2025-08-06 DIAGNOSIS — Z95.811 HEART REPLACED BY HEART ASSIST DEVICE: ICD-10-CM

## 2025-08-06 LAB
AORTIC SIZE INDEX (SOV): 1.5 CM/M2
AORTIC SIZE INDEX: 1.7 CM/M2
ASCENDING AORTA: 3.8 CM
BSA FOR ECHO PROCEDURE: 2.28 M2
CV ECHO LV RWT: 0.28 CM
DOP CALC LVOT AREA: 4.5 CM2
DOP CALC LVOT DIAMETER: 2.4 CM
ECHO LV POSTERIOR WALL: 0.9 CM (ref 0.6–1.1)
FRACTIONAL SHORTENING: 34.4 % (ref 28–44)
INTERVENTRICULAR SEPTUM: 1.1 CM (ref 0.6–1.1)
IVC DIAMETER: 0.84 CM
LA MAJOR: 3.7 CM
LA MINOR: 5.5 CM
LA WIDTH: 3.1 CM
LEFT ATRIUM SIZE: 5.5 CM
LEFT ATRIUM VOLUME INDEX MOD: 33 ML/M2
LEFT ATRIUM VOLUME INDEX: 29 ML/M2
LEFT ATRIUM VOLUME MOD: 73 ML
LEFT ATRIUM VOLUME: 64 CM3
LEFT INTERNAL DIMENSION IN SYSTOLE: 4.2 CM (ref 2.1–4)
LEFT VENTRICLE DIASTOLIC VOLUME INDEX: 62.78 ML/M2
LEFT VENTRICLE DIASTOLIC VOLUME: 140 ML
LEFT VENTRICLE MASS INDEX: 123.6 G/M2
LEFT VENTRICULAR INTERNAL DIMENSION IN DIASTOLE: 6.4 CM (ref 3.5–6)
LEFT VENTRICULAR MASS: 275.6 G
Lab: 1.8 CM/M
Lab: 2.1 CM/M
OHS CV CPX PATIENT HEIGHT IN: 70.87
OHS CV RV/LV RATIO: 0.8 CM
PISA TR MAX VEL: 2.6 M/S
RA MAJOR: 5.94 CM
RA PRESSURE ESTIMATED: 3 MMHG
RA WIDTH: 5.03 CM
RIGHT ATRIAL AREA: 22.7 CM2
RIGHT VENTRICLE DIASTOLIC BASEL DIMENSION: 5.1 CM
RV TB RVSP: 6 MMHG
SINUS: 3.3 CM
STJ: 3.4 CM
TDI LATERAL: 0.06 M/S
TDI SEPTAL: 0.04 M/S
TDI: 0.05 M/S
TRICUSPID ANNULAR PLANE SYSTOLIC EXCURSION: 1.5 CM
TV PEAK GRADIENT: 27 MMHG
TV REST PULMONARY ARTERY PRESSURE: 30 MMHG
Z-SCORE OF LEFT VENTRICULAR DIMENSION IN END DIASTOLE: -2.08
Z-SCORE OF LEFT VENTRICULAR DIMENSION IN END SYSTOLE: -1.02

## 2025-08-06 PROCEDURE — 93306 TTE W/DOPPLER COMPLETE: CPT | Mod: 26,,, | Performed by: INTERNAL MEDICINE

## 2025-08-06 PROCEDURE — 93306 TTE W/DOPPLER COMPLETE: CPT

## 2025-08-06 NOTE — PROGRESS NOTES
Jordan Hadley,     On 8/1 we did a doppler check and VAD interrogation after norvasc was stopped in clinic, Doppler 82/0mmhg, patient is non-pulsatile. Continues to have frequent PI events w/ speed drops. We were going to do a sooner clinic appointment but then patient called reporting the symptoms.     An echo was obtained, pt reported that he has continued to feel dizzy when he stands. Had a fall while going to lab for INR check, denies LOC and hitting his head. Advised patient that if symptoms worsen to report to ER.     Can you review and advise next steps

## 2025-08-07 ENCOUNTER — LAB VISIT (OUTPATIENT)
Dept: LAB | Facility: HOSPITAL | Age: 60
End: 2025-08-07
Payer: MEDICARE

## 2025-08-07 ENCOUNTER — CLINICAL SUPPORT (OUTPATIENT)
Dept: TRANSPLANT | Facility: CLINIC | Age: 60
End: 2025-08-07
Payer: MEDICARE

## 2025-08-07 ENCOUNTER — OFFICE VISIT (OUTPATIENT)
Dept: TRANSPLANT | Facility: CLINIC | Age: 60
End: 2025-08-07
Attending: INTERNAL MEDICINE
Payer: MEDICARE

## 2025-08-07 VITALS
SYSTOLIC BLOOD PRESSURE: 68 MMHG | TEMPERATURE: 98 F | BODY MASS INDEX: 29.69 KG/M2 | HEIGHT: 73 IN | WEIGHT: 224 LBS | HEART RATE: 63 BPM

## 2025-08-07 DIAGNOSIS — Z95.811 HEART REPLACED: Primary | ICD-10-CM

## 2025-08-07 DIAGNOSIS — Z79.01 ANTICOAGULATION MONITORING, INR RANGE 2-3: ICD-10-CM

## 2025-08-07 DIAGNOSIS — Z95.811 LVAD (LEFT VENTRICULAR ASSIST DEVICE) PRESENT: ICD-10-CM

## 2025-08-07 DIAGNOSIS — Z95.811 HEART REPLACED BY HEART ASSIST DEVICE: ICD-10-CM

## 2025-08-07 DIAGNOSIS — Z86.79 HISTORY OF VENTRICULAR TACHYCARDIA: ICD-10-CM

## 2025-08-07 DIAGNOSIS — F19.982 SUBSTANCE OR MEDICATION-INDUCED SLEEP DISORDER, INSOMNIA TYPE: ICD-10-CM

## 2025-08-07 DIAGNOSIS — F10.10 ALCOHOL ABUSE: ICD-10-CM

## 2025-08-07 DIAGNOSIS — I51.89 COMBINED SYSTOLIC AND DIASTOLIC CARDIAC DYSFUNCTION: ICD-10-CM

## 2025-08-07 DIAGNOSIS — R79.9 ABNORMAL FINDING OF BLOOD CHEMISTRY: ICD-10-CM

## 2025-08-07 DIAGNOSIS — Z95.811 LVAD (LEFT VENTRICULAR ASSIST DEVICE) PRESENT: Primary | Chronic | ICD-10-CM

## 2025-08-07 DIAGNOSIS — D59.8 OTHER ACQUIRED HEMOLYTIC ANEMIAS: ICD-10-CM

## 2025-08-07 LAB
ABSOLUTE EOSINOPHIL (OHS): 0.15 K/UL
ABSOLUTE MONOCYTE (OHS): 0.49 K/UL (ref 0.3–1)
ABSOLUTE NEUTROPHIL COUNT (OHS): 1.71 K/UL (ref 1.8–7.7)
ALBUMIN SERPL BCP-MCNC: 3.7 G/DL (ref 3.5–5.2)
ALP SERPL-CCNC: 110 UNIT/L (ref 40–150)
ALT SERPL W/O P-5'-P-CCNC: 63 UNIT/L (ref 0–55)
ANION GAP (OHS): 9 MMOL/L (ref 8–16)
AST SERPL-CCNC: 75 UNIT/L (ref 0–50)
BASOPHILS # BLD AUTO: 0.01 K/UL
BASOPHILS NFR BLD AUTO: 0.3 %
BILIRUB DIRECT SERPL-MCNC: 0.2 MG/DL (ref 0.1–0.3)
BILIRUB SERPL-MCNC: 0.4 MG/DL (ref 0.1–1)
BUN SERPL-MCNC: 18 MG/DL (ref 6–20)
CALCIUM SERPL-MCNC: 9.3 MG/DL (ref 8.7–10.5)
CHLORIDE SERPL-SCNC: 106 MMOL/L (ref 95–110)
CO2 SERPL-SCNC: 24 MMOL/L (ref 23–29)
CREAT SERPL-MCNC: 2.3 MG/DL (ref 0.5–1.4)
CRP SERPL-MCNC: 10.5 MG/L
ERYTHROCYTE [DISTWIDTH] IN BLOOD BY AUTOMATED COUNT: 14.6 % (ref 11.5–14.5)
GFR SERPLBLD CREATININE-BSD FMLA CKD-EPI: 32 ML/MIN/1.73/M2
GLUCOSE SERPL-MCNC: 86 MG/DL (ref 70–110)
HCT VFR BLD AUTO: 41.7 % (ref 40–54)
HGB BLD-MCNC: 12.7 GM/DL (ref 14–18)
IMM GRANULOCYTES # BLD AUTO: 0.01 K/UL (ref 0–0.04)
IMM GRANULOCYTES NFR BLD AUTO: 0.3 % (ref 0–0.5)
INR PPP: 2.8 (ref 0.8–1.2)
LDH SERPL-CCNC: 247 U/L (ref 110–260)
LYMPHOCYTES # BLD AUTO: 1.13 K/UL (ref 1–4.8)
MAGNESIUM SERPL-MCNC: 1.9 MG/DL (ref 1.6–2.6)
MCH RBC QN AUTO: 28.2 PG (ref 27–31)
MCHC RBC AUTO-ENTMCNC: 30.5 G/DL (ref 32–36)
MCV RBC AUTO: 93 FL (ref 82–98)
NT-PROBNP SERPL-MCNC: 498 PG/ML
NUCLEATED RBC (/100WBC) (OHS): 0 /100 WBC
PHOSPHATE SERPL-MCNC: 2.5 MG/DL (ref 2.7–4.5)
PLATELET # BLD AUTO: 246 K/UL (ref 150–450)
PMV BLD AUTO: 10.4 FL (ref 9.2–12.9)
POTASSIUM SERPL-SCNC: 4.2 MMOL/L (ref 3.5–5.1)
PREALB SERPL-MCNC: 21 MG/DL (ref 20–43)
PROT SERPL-MCNC: 7.4 GM/DL (ref 6–8.4)
PROTHROMBIN TIME: 28.6 SECONDS (ref 9–12.5)
RBC # BLD AUTO: 4.5 M/UL (ref 4.6–6.2)
RELATIVE EOSINOPHIL (OHS): 4.3 %
RELATIVE LYMPHOCYTE (OHS): 32.3 % (ref 18–48)
RELATIVE MONOCYTE (OHS): 14 % (ref 4–15)
RELATIVE NEUTROPHIL (OHS): 48.8 % (ref 38–73)
SODIUM SERPL-SCNC: 139 MMOL/L (ref 136–145)
WBC # BLD AUTO: 3.5 K/UL (ref 3.9–12.7)

## 2025-08-07 PROCEDURE — 93750 INTERROGATION VAD IN PERSON: CPT | Mod: S$GLB,,, | Performed by: INTERNAL MEDICINE

## 2025-08-07 PROCEDURE — 1111F DSCHRG MED/CURRENT MED MERGE: CPT | Mod: CPTII,S$GLB,, | Performed by: INTERNAL MEDICINE

## 2025-08-07 PROCEDURE — 83615 LACTATE (LD) (LDH) ENZYME: CPT

## 2025-08-07 PROCEDURE — 86140 C-REACTIVE PROTEIN: CPT

## 2025-08-07 PROCEDURE — 1159F MED LIST DOCD IN RCRD: CPT | Mod: CPTII,S$GLB,, | Performed by: INTERNAL MEDICINE

## 2025-08-07 PROCEDURE — 80053 COMPREHEN METABOLIC PANEL: CPT

## 2025-08-07 PROCEDURE — 99215 OFFICE O/P EST HI 40 MIN: CPT | Mod: 25,S$GLB,, | Performed by: INTERNAL MEDICINE

## 2025-08-07 PROCEDURE — 83880 ASSAY OF NATRIURETIC PEPTIDE: CPT

## 2025-08-07 PROCEDURE — 3008F BODY MASS INDEX DOCD: CPT | Mod: CPTII,S$GLB,, | Performed by: INTERNAL MEDICINE

## 2025-08-07 PROCEDURE — 1160F RVW MEDS BY RX/DR IN RCRD: CPT | Mod: CPTII,S$GLB,, | Performed by: INTERNAL MEDICINE

## 2025-08-07 PROCEDURE — 36415 COLL VENOUS BLD VENIPUNCTURE: CPT

## 2025-08-07 PROCEDURE — 85610 PROTHROMBIN TIME: CPT

## 2025-08-07 PROCEDURE — 83735 ASSAY OF MAGNESIUM: CPT

## 2025-08-07 PROCEDURE — 85025 COMPLETE CBC W/AUTO DIFF WBC: CPT

## 2025-08-07 PROCEDURE — 99999 PR PBB SHADOW E&M-EST. PATIENT-LVL V: CPT | Mod: PBBFAC,,, | Performed by: INTERNAL MEDICINE

## 2025-08-07 PROCEDURE — 84100 ASSAY OF PHOSPHORUS: CPT

## 2025-08-07 PROCEDURE — 84134 ASSAY OF PREALBUMIN: CPT

## 2025-08-07 PROCEDURE — 82248 BILIRUBIN DIRECT: CPT

## 2025-08-07 NOTE — PROGRESS NOTES
Pt here today for Dr. Hadley to review labs, VS, VAD parameters and echo.  31 speed drops to 6000 noted today in history. Doppler 68, non pulsatile.   Flow 3.5, 6300 RPMS, 5.3 w, PI 5.4. Of note, when pt stood up, PI dropped to 1.8 and did not return to baseline during visit. Refer to MD Note, no changes made except for pt to hydrate with fluids. May consider RHC if persistent symptoms.   Did not visualize DLES.

## 2025-08-10 PROBLEM — N18.9 ACUTE KIDNEY INJURY SUPERIMPOSED ON CKD: Status: RESOLVED | Noted: 2022-07-15 | Resolved: 2025-08-10

## 2025-08-10 PROBLEM — N17.9 ACUTE KIDNEY INJURY SUPERIMPOSED ON CKD: Status: RESOLVED | Noted: 2022-07-15 | Resolved: 2025-08-10

## 2025-08-11 ENCOUNTER — LAB VISIT (OUTPATIENT)
Dept: LAB | Facility: HOSPITAL | Age: 60
End: 2025-08-11
Attending: INTERNAL MEDICINE
Payer: MEDICARE

## 2025-08-11 ENCOUNTER — ANTI-COAG VISIT (OUTPATIENT)
Dept: CARDIOLOGY | Facility: CLINIC | Age: 60
End: 2025-08-11
Payer: MEDICARE

## 2025-08-11 ENCOUNTER — TELEPHONE (OUTPATIENT)
Dept: INFECTIOUS DISEASES | Facility: CLINIC | Age: 60
End: 2025-08-11
Payer: MEDICARE

## 2025-08-11 DIAGNOSIS — Z95.811 HEART REPLACED BY HEART ASSIST DEVICE: ICD-10-CM

## 2025-08-11 LAB
INR PPP: 3.1 (ref 0.8–1.2)
PROTHROMBIN TIME: 32.1 SECONDS (ref 9–12.5)

## 2025-08-11 PROCEDURE — 93793 ANTICOAG MGMT PT WARFARIN: CPT | Mod: S$GLB,,,

## 2025-08-11 PROCEDURE — 36415 COLL VENOUS BLD VENIPUNCTURE: CPT

## 2025-08-11 PROCEDURE — 85610 PROTHROMBIN TIME: CPT

## 2025-08-12 ENCOUNTER — PATIENT MESSAGE (OUTPATIENT)
Dept: TRANSPLANT | Facility: CLINIC | Age: 60
End: 2025-08-12
Payer: MEDICARE

## 2025-08-12 RX ORDER — LINEZOLID 600 MG/1
600 TABLET, FILM COATED ORAL EVERY 12 HOURS
Qty: 28 TABLET | Refills: 0 | Status: SHIPPED | OUTPATIENT
Start: 2025-08-12 | End: 2025-08-26

## 2025-08-15 ENCOUNTER — ANTI-COAG VISIT (OUTPATIENT)
Dept: CARDIOLOGY | Facility: CLINIC | Age: 60
End: 2025-08-15
Payer: MEDICARE

## 2025-08-15 ENCOUNTER — LAB VISIT (OUTPATIENT)
Dept: LAB | Facility: HOSPITAL | Age: 60
End: 2025-08-15
Attending: INTERNAL MEDICINE
Payer: MEDICARE

## 2025-08-15 DIAGNOSIS — F10.10 ALCOHOL ABUSE: ICD-10-CM

## 2025-08-15 DIAGNOSIS — Z95.811 HEART REPLACED BY HEART ASSIST DEVICE: ICD-10-CM

## 2025-08-15 LAB
INR PPP: 4.2 (ref 0.8–1.2)
PROTHROMBIN TIME: 42.9 SECONDS (ref 9–12.5)

## 2025-08-15 PROCEDURE — 36415 COLL VENOUS BLD VENIPUNCTURE: CPT

## 2025-08-15 PROCEDURE — 85610 PROTHROMBIN TIME: CPT

## 2025-08-15 PROCEDURE — 80321 ALCOHOLS BIOMARKERS 1OR 2: CPT

## 2025-08-18 ENCOUNTER — LAB VISIT (OUTPATIENT)
Dept: LAB | Facility: HOSPITAL | Age: 60
End: 2025-08-18
Attending: INTERNAL MEDICINE
Payer: MEDICARE

## 2025-08-18 ENCOUNTER — ANTI-COAG VISIT (OUTPATIENT)
Dept: CARDIOLOGY | Facility: CLINIC | Age: 60
End: 2025-08-18
Payer: MEDICARE

## 2025-08-18 DIAGNOSIS — Z95.811 HEART REPLACED BY HEART ASSIST DEVICE: ICD-10-CM

## 2025-08-18 LAB
INR PPP: 5.2 (ref 0.8–1.2)
PROTHROMBIN TIME: 52.2 SECONDS (ref 9–12.5)

## 2025-08-18 PROCEDURE — 85610 PROTHROMBIN TIME: CPT

## 2025-08-18 PROCEDURE — 36415 COLL VENOUS BLD VENIPUNCTURE: CPT

## 2025-08-18 PROCEDURE — 93793 ANTICOAG MGMT PT WARFARIN: CPT | Mod: S$GLB,,,

## 2025-08-20 ENCOUNTER — ANTI-COAG VISIT (OUTPATIENT)
Dept: CARDIOLOGY | Facility: CLINIC | Age: 60
End: 2025-08-20
Payer: MEDICARE

## 2025-08-20 ENCOUNTER — LAB VISIT (OUTPATIENT)
Dept: LAB | Facility: HOSPITAL | Age: 60
End: 2025-08-20
Attending: INTERNAL MEDICINE
Payer: MEDICARE

## 2025-08-20 DIAGNOSIS — Z95.811 HEART REPLACED BY HEART ASSIST DEVICE: ICD-10-CM

## 2025-08-20 LAB
INR PPP: 3.9 (ref 0.8–1.2)
PLPETH BLD-MCNC: <10 NG/ML
POPETH BLD-MCNC: 67 NG/ML
PROTHROMBIN TIME: 40.2 SECONDS (ref 9–12.5)
TOXICOLOGIST REVIEW: NORMAL

## 2025-08-20 PROCEDURE — 36415 COLL VENOUS BLD VENIPUNCTURE: CPT

## 2025-08-20 PROCEDURE — 85610 PROTHROMBIN TIME: CPT

## 2025-08-21 ENCOUNTER — OFFICE VISIT (OUTPATIENT)
Dept: PSYCHIATRY | Facility: CLINIC | Age: 60
End: 2025-08-21
Payer: MEDICARE

## 2025-08-21 ENCOUNTER — LAB VISIT (OUTPATIENT)
Dept: LAB | Facility: HOSPITAL | Age: 60
End: 2025-08-21
Attending: INTERNAL MEDICINE
Payer: MEDICARE

## 2025-08-21 ENCOUNTER — ANTI-COAG VISIT (OUTPATIENT)
Dept: CARDIOLOGY | Facility: CLINIC | Age: 60
End: 2025-08-21
Payer: MEDICARE

## 2025-08-21 VITALS — HEIGHT: 73 IN | WEIGHT: 228.81 LBS | BODY MASS INDEX: 30.33 KG/M2

## 2025-08-21 DIAGNOSIS — F41.1 GENERALIZED ANXIETY DISORDER: ICD-10-CM

## 2025-08-21 DIAGNOSIS — F10.90 ALCOHOL USE DISORDER: Primary | ICD-10-CM

## 2025-08-21 DIAGNOSIS — G47.00 INSOMNIA, UNSPECIFIED TYPE: ICD-10-CM

## 2025-08-21 DIAGNOSIS — Z95.811 HEART REPLACED BY HEART ASSIST DEVICE: ICD-10-CM

## 2025-08-21 PROBLEM — F10.930 ALCOHOL WITHDRAWAL, UNCOMPLICATED: Status: RESOLVED | Noted: 2025-03-07 | Resolved: 2025-08-21

## 2025-08-21 LAB
INR PPP: 3.8 (ref 0.8–1.2)
PROTHROMBIN TIME: 38.7 SECONDS (ref 9–12.5)

## 2025-08-21 PROCEDURE — 3008F BODY MASS INDEX DOCD: CPT | Mod: CPTII,S$GLB,,

## 2025-08-21 PROCEDURE — 1160F RVW MEDS BY RX/DR IN RCRD: CPT | Mod: CPTII,S$GLB,,

## 2025-08-21 PROCEDURE — 99214 OFFICE O/P EST MOD 30 MIN: CPT | Mod: S$GLB,,,

## 2025-08-21 PROCEDURE — 36415 COLL VENOUS BLD VENIPUNCTURE: CPT

## 2025-08-21 PROCEDURE — 1159F MED LIST DOCD IN RCRD: CPT | Mod: CPTII,S$GLB,,

## 2025-08-21 PROCEDURE — 99999 PR PBB SHADOW E&M-EST. PATIENT-LVL III: CPT | Mod: PBBFAC,,,

## 2025-08-21 PROCEDURE — G2211 COMPLEX E/M VISIT ADD ON: HCPCS | Mod: S$GLB,,,

## 2025-08-21 PROCEDURE — 85610 PROTHROMBIN TIME: CPT

## 2025-08-25 ENCOUNTER — ANTI-COAG VISIT (OUTPATIENT)
Dept: CARDIOLOGY | Facility: CLINIC | Age: 60
End: 2025-08-25
Payer: MEDICARE

## 2025-08-25 ENCOUNTER — LAB VISIT (OUTPATIENT)
Dept: LAB | Facility: HOSPITAL | Age: 60
End: 2025-08-25
Attending: INTERNAL MEDICINE
Payer: MEDICARE

## 2025-08-25 DIAGNOSIS — Z95.811 HEART REPLACED BY HEART ASSIST DEVICE: ICD-10-CM

## 2025-08-25 LAB
INR PPP: 2.5 (ref 0.8–1.2)
PROTHROMBIN TIME: 26.6 SECONDS (ref 9–12.5)

## 2025-08-25 PROCEDURE — 85610 PROTHROMBIN TIME: CPT

## 2025-08-25 PROCEDURE — 36415 COLL VENOUS BLD VENIPUNCTURE: CPT

## 2025-08-26 ENCOUNTER — PATIENT OUTREACH (OUTPATIENT)
Dept: ADMINISTRATIVE | Facility: HOSPITAL | Age: 60
End: 2025-08-26
Payer: MEDICARE

## 2025-08-27 ENCOUNTER — LAB VISIT (OUTPATIENT)
Dept: LAB | Facility: HOSPITAL | Age: 60
End: 2025-08-27
Attending: INTERNAL MEDICINE
Payer: MEDICARE

## 2025-08-27 ENCOUNTER — DOCUMENTATION ONLY (OUTPATIENT)
Dept: TRANSPLANT | Facility: CLINIC | Age: 60
End: 2025-08-27
Payer: MEDICARE

## 2025-08-27 ENCOUNTER — ANTI-COAG VISIT (OUTPATIENT)
Dept: CARDIOLOGY | Facility: CLINIC | Age: 60
End: 2025-08-27
Payer: MEDICARE

## 2025-08-27 DIAGNOSIS — F10.20 ALCOHOL DEPENDENCE, UNCOMPLICATED: ICD-10-CM

## 2025-08-27 DIAGNOSIS — F10.10 ALCOHOL ABUSE: Primary | ICD-10-CM

## 2025-08-27 DIAGNOSIS — R79.9 ABNORMAL FINDING OF BLOOD CHEMISTRY: ICD-10-CM

## 2025-08-27 DIAGNOSIS — F19.982 SUBSTANCE OR MEDICATION-INDUCED SLEEP DISORDER, INSOMNIA TYPE: ICD-10-CM

## 2025-08-27 DIAGNOSIS — Z95.811 HEART REPLACED BY HEART ASSIST DEVICE: ICD-10-CM

## 2025-08-27 DIAGNOSIS — D59.8 OTHER ACQUIRED HEMOLYTIC ANEMIAS: ICD-10-CM

## 2025-08-27 LAB
INR PPP: 2 (ref 0.8–1.2)
PROTHROMBIN TIME: 21.2 SECONDS (ref 9–12.5)

## 2025-08-27 PROCEDURE — 83036 HEMOGLOBIN GLYCOSYLATED A1C: CPT

## 2025-08-27 PROCEDURE — 36415 COLL VENOUS BLD VENIPUNCTURE: CPT

## 2025-08-27 PROCEDURE — 85610 PROTHROMBIN TIME: CPT

## 2025-08-28 LAB
EAG (OHS): 103 MG/DL (ref 68–131)
HBA1C MFR BLD: 5.2 % (ref 4–5.6)

## 2025-08-29 ENCOUNTER — OFFICE VISIT (OUTPATIENT)
Dept: INFECTIOUS DISEASES | Facility: CLINIC | Age: 60
End: 2025-08-29
Payer: MEDICARE

## 2025-08-29 VITALS — BODY MASS INDEX: 31.06 KG/M2 | HEIGHT: 73 IN | WEIGHT: 234.38 LBS | TEMPERATURE: 98 F

## 2025-08-29 DIAGNOSIS — T82.7XXA INFECTION ASSOCIATED WITH DRIVELINE OF VENTRICULAR ASSIST DEVICE: Primary | ICD-10-CM

## 2025-08-29 DIAGNOSIS — Z79.2 CHRONIC ANTIBIOTIC SUPPRESSION: ICD-10-CM

## 2025-08-29 PROCEDURE — 1159F MED LIST DOCD IN RCRD: CPT | Mod: CPTII,S$GLB,, | Performed by: INTERNAL MEDICINE

## 2025-08-29 PROCEDURE — 3008F BODY MASS INDEX DOCD: CPT | Mod: CPTII,S$GLB,, | Performed by: INTERNAL MEDICINE

## 2025-08-29 PROCEDURE — 1160F RVW MEDS BY RX/DR IN RCRD: CPT | Mod: CPTII,S$GLB,, | Performed by: INTERNAL MEDICINE

## 2025-08-29 PROCEDURE — G2211 COMPLEX E/M VISIT ADD ON: HCPCS | Mod: S$GLB,,, | Performed by: INTERNAL MEDICINE

## 2025-08-29 PROCEDURE — 99999 PR PBB SHADOW E&M-EST. PATIENT-LVL III: CPT | Mod: PBBFAC,,, | Performed by: INTERNAL MEDICINE

## 2025-08-29 PROCEDURE — 3044F HG A1C LEVEL LT 7.0%: CPT | Mod: CPTII,S$GLB,, | Performed by: INTERNAL MEDICINE

## 2025-08-29 PROCEDURE — 99214 OFFICE O/P EST MOD 30 MIN: CPT | Mod: S$GLB,,, | Performed by: INTERNAL MEDICINE

## 2025-09-03 ENCOUNTER — CLINICAL SUPPORT (OUTPATIENT)
Dept: CARDIOLOGY | Facility: HOSPITAL | Age: 60
End: 2025-09-03
Attending: INTERNAL MEDICINE
Payer: MEDICARE

## 2025-09-03 ENCOUNTER — ANTI-COAG VISIT (OUTPATIENT)
Dept: CARDIOLOGY | Facility: CLINIC | Age: 60
End: 2025-09-03
Payer: MEDICARE

## 2025-09-03 DIAGNOSIS — Z95.810 ICD (IMPLANTABLE CARDIOVERTER-DEFIBRILLATOR) IN PLACE: ICD-10-CM

## 2025-09-03 DIAGNOSIS — I47.20 VT (VENTRICULAR TACHYCARDIA): ICD-10-CM

## 2025-09-03 PROCEDURE — 93260 PRGRMG DEV EVAL IMPLTBL SYS: CPT

## 2025-09-04 ENCOUNTER — TELEPHONE (OUTPATIENT)
Dept: ELECTROPHYSIOLOGY | Facility: CLINIC | Age: 60
End: 2025-09-04
Payer: MEDICARE

## 2025-09-04 DIAGNOSIS — Z95.811 LVAD (LEFT VENTRICULAR ASSIST DEVICE) PRESENT: Primary | ICD-10-CM

## 2025-09-04 DIAGNOSIS — I47.20 VT (VENTRICULAR TACHYCARDIA): ICD-10-CM

## 2025-09-04 LAB — OHS CV DC REMOTE DEVICE TYPE: NORMAL

## (undated) DEVICE — DEVICE COMPR SAFEGUARD 24CM

## (undated) DEVICE — KIT BRIDGE ACCESSORY

## (undated) DEVICE — PACK SET UP CONVERTORS

## (undated) DEVICE — DEVICE LEAD EXTRACTION 2 65CM

## (undated) DEVICE — SPONGE LAP 18X18 PREWASHED

## (undated) DEVICE — PACEMAKER SHEET

## (undated) DEVICE — WRENCH PM PINCH 6.5MM LEAD

## (undated) DEVICE — DRAPE IOBAN 2 STERI

## (undated) DEVICE — DRAPE SLUSH WARMER WITH DISC

## (undated) DEVICE — SEE MEDLINE ITEM 146313

## (undated) DEVICE — DRAPE INCISE IOBAN 2 23X33IN

## (undated) DEVICE — SUT 2/0 36IN COATED VICRYL

## (undated) DEVICE — TRAY CATH FOL SIL URIMTR 16FR

## (undated) DEVICE — CLOSURE SKIN STERI STRIP 1/2X4

## (undated) DEVICE — Device

## (undated) DEVICE — SUT SILK 2-0 SH 18IN BLACK

## (undated) DEVICE — CUTTER LEAD

## (undated) DEVICE — DRAPE STERI INSTRUMENT 1018

## (undated) DEVICE — CATH THOR STND RGHT ANG 32FR

## (undated) DEVICE — KIT WRENCH

## (undated) DEVICE — BLADE PLASMA WIDE SPATULA TIP

## (undated) DEVICE — SUT SILK 2-0 STRANDS 30IN

## (undated) DEVICE — SUT SILK 3-0 STRANDS 30IN

## (undated) DEVICE — PACK PACER PERMANENT

## (undated) DEVICE — SUT VICRYL BR 1 GEN 27 CT-1

## (undated) DEVICE — SUT MCRYL PLUS 4-0 PS2 27IN

## (undated) DEVICE — SCALPEL #11 BLADE STRL DISP

## (undated) DEVICE — SUT 2-0 VICRYL / CT-1

## (undated) DEVICE — LOOP VESSEL BLUE MAXI

## (undated) DEVICE — APPLICATOR CHLORAPREP ORN 26ML

## (undated) DEVICE — CONNECTOR 1/4X3/16X3/16 Y

## (undated) DEVICE — HOLDER TRACH TUBE NECKBAND

## (undated) DEVICE — DEVICE LEAD EXTRACTION 1 65CM

## (undated) DEVICE — REMOVER STAPLE SKIN STERILE

## (undated) DEVICE — CATH ALL PUR URTHL RR 20FR

## (undated) DEVICE — APPLIER CLIP LIAGCLIP 9.375IN

## (undated) DEVICE — PACK DRAPE UNIVERSAL CONVERTOR

## (undated) DEVICE — RETRACTOR OCTOBASE INSERT HOLD

## (undated) DEVICE — SOL 9P NACL IRR PIC IL

## (undated) DEVICE — NDL PERCUTANEOUS ENTRYBSDN 18

## (undated) DEVICE — GLOVE SURG BIOGEL LATEX SZ 7.5

## (undated) DEVICE — KIT PREVENA PLUS

## (undated) DEVICE — SUT PROLENE 5-0 36IN C-1

## (undated) DEVICE — CONTAINER SPECIMEN STRL 4OZ

## (undated) DEVICE — PAD DEFIB CADENCE ADULT R2

## (undated) DEVICE — CLIP MED TICALL

## (undated) DEVICE — TRAY PERIPHERAL VASCULAR OMC

## (undated) DEVICE — SUT PROLENE 4-0 SH BLU 36IN

## (undated) DEVICE — SUT PROLENE 0-36 V-7

## (undated) DEVICE — INSERTS STEALTH FIBRA SIZE 5

## (undated) DEVICE — KIT STYLET 65CM

## (undated) DEVICE — PLEDGET SUT SOFT 3/8X3/16X1/16

## (undated) DEVICE — PATCH SEALANT EVARREST 2X4

## (undated) DEVICE — NDL BOX COUNTER

## (undated) DEVICE — GOWN SURGICAL X-LARGE

## (undated) DEVICE — KIT URINARY CATH URINE METER

## (undated) DEVICE — ELECTRODE REM PLYHSV RETURN 9

## (undated) DEVICE — PACK UNIVERSAL SPLIT II

## (undated) DEVICE — SHEATH TIGHTRAL SUB-C 9FR

## (undated) DEVICE — SET DECANTER MEDICHOICE

## (undated) DEVICE — SHEATH INTRODUCER 9FR 11CM

## (undated) DEVICE — SUT PROLENE 5-0 BL C-1 4-24

## (undated) DEVICE — BLADE SAW STERNAL 5/BX

## (undated) DEVICE — SUT BONE WAX 2.5 GRMS 12/BX

## (undated) DEVICE — TOWEL OR DISP STRL BLUE 4/PK

## (undated) DEVICE — TRAY MINOR GEN SURG

## (undated) DEVICE — SUT PROLENE 5-0 RB1 8756H

## (undated) DEVICE — NDL N SERIES MICRO-DISSECTION

## (undated) DEVICE — BLADE HALL STERILE

## (undated) DEVICE — SUT PROLENE 4-0 RB-1 BL MO

## (undated) DEVICE — FOGERTY SOFT JAW DISP 2/PK

## (undated) DEVICE — SWABSTICK BENZOIN 4 IN

## (undated) DEVICE — DRAPE INCISE IOBAN 2 23X17IN

## (undated) DEVICE — PAD K-THERMIA 24IN X 60IN

## (undated) DEVICE — CONTROLLER

## (undated) DEVICE — SUT PROLENE 6-0 24 BV-1

## (undated) DEVICE — SPONGE IV DRAIN 4X4 STERILE

## (undated) DEVICE — TAPE SURG MEDIPORE 6X72IN

## (undated) DEVICE — DRAIN CHEST DRY SUCTION

## (undated) DEVICE — STAPLER SKIN PROXIMATE WIDE

## (undated) DEVICE — STAPLER SKIN REGULAR

## (undated) DEVICE — HEMOSTAT SURGICEL PWD 3G

## (undated) DEVICE — BOOT AIR FLUID HEEL ADLT STD

## (undated) DEVICE — PENCIL ROCKER SWITCH 10FT CORD

## (undated) DEVICE — SUT SILK 3-0 BLK BR SH 30IN

## (undated) DEVICE — DRESSING TRANS 6X8 TEGADERM

## (undated) DEVICE — LUBRICANT SURGILUBE 2 OZ

## (undated) DEVICE — SUT 2/0 36IN ETHIBOND EXCE

## (undated) DEVICE — BOOT SUTURE AID

## (undated) DEVICE — SET MICROPUNCTURE 5FR 501NT

## (undated) DEVICE — HEMOSTAT SURGICEL NU-KNIT 6X9

## (undated) DEVICE — TRAY HEART OMC

## (undated) DEVICE — HOLDER TUBE

## (undated) DEVICE — GAUZE SPONGE 4X4 12PLY

## (undated) DEVICE — SYR 50CC LL

## (undated) DEVICE — COVER LIGHT HANDLE 80/CA

## (undated) DEVICE — COVER PROBE US 5.5X58L NON LTX

## (undated) DEVICE — TAPE MEDIPORE SOFT CLOTH 2X2

## (undated) DEVICE — DRESSING AQUACEL AG ADV 3.5X6

## (undated) DEVICE — DRAIN CHANNEL ROUND 19FR

## (undated) DEVICE — TIP YANKAUERS BULB NO VENT

## (undated) DEVICE — DRESSING INFOVAC SMALL BLK

## (undated) DEVICE — TRAY HEART

## (undated) DEVICE — DRESSING ABSRBNT ISLAND 3.6X8

## (undated) DEVICE — BLADE SURG CARBON STEEL SZ11

## (undated) DEVICE — STAPLER SKIN WIDE

## (undated) DEVICE — CATH SWAN GANZ STND 7FR

## (undated) DEVICE — DRESSING TELFA STRL 4X3 LF

## (undated) DEVICE — SPONGE GAUZE 16PLY 4X4

## (undated) DEVICE — TUBING SUC UNIV W/CONN 12FT

## (undated) DEVICE — SUT SILK BLK BR. 2 2-60

## (undated) DEVICE — GOWN POLY REINF BRTH SLV XL

## (undated) DEVICE — SUT 3-0 12-18IN SILK

## (undated) DEVICE — DRESSING AQUACEL AG ADV 3.5X12

## (undated) DEVICE — KIT SAHARA DRAPE DRAW/LIFT

## (undated) DEVICE — SEE MEDLINE ITEM 154981

## (undated) DEVICE — EXTENDER LEAD BULLDOG 70CM

## (undated) DEVICE — SUT 3-0 CTD VICRYL 27IN PS

## (undated) DEVICE — ELECTRODE PAD DEFIB STERILE

## (undated) DEVICE — SUT MONOCRYL 2-0 CT-1 VIL

## (undated) DEVICE — VALVE ULTRASITE MALE LUER

## (undated) DEVICE — KIT SHEATH GLIDELIGHT LSR 14FR

## (undated) DEVICE — SOL NS 1000CC

## (undated) DEVICE — APPLIER LIGACLIP SM 9.38IN

## (undated) DEVICE — SUT ETHIBOND XTRA 1 CTX

## (undated) DEVICE — GLOVE GAMMEX SURG LF PI SZ 7.5

## (undated) DEVICE — GOWN NONREINF SET-IN SLV 2XL

## (undated) DEVICE — DRESSING TRANS 4X4 TEGADERM

## (undated) DEVICE — STAPLER SKIN ROTATING HEAD

## (undated) DEVICE — SUT 2/0 30IN ETHIBOND

## (undated) DEVICE — CATH 5FR MULTI MPA2

## (undated) DEVICE — CHLORAPREP 10.5 ML APPLICATOR

## (undated) DEVICE — CONVERTORS PACEMAKER SHEET

## (undated) DEVICE — SHEATH INTRODUCER 7FR 11CM

## (undated) DEVICE — BOVIE SUCTION

## (undated) DEVICE — BANDAGE ESMARK 6X12

## (undated) DEVICE — GLOVE BIOGEL PI MICRO SZ 7

## (undated) DEVICE — DRESSING TRANS 2X2 TEGADERM

## (undated) DEVICE — STOPCOCK 3-WAY

## (undated) DEVICE — GAUZE SPONGE 4'X4 12 PLY

## (undated) DEVICE — SEE MEDLINE ITEM 152512

## (undated) DEVICE — SUT 1 48IN PDS II VIO MONO

## (undated) DEVICE — SUT SILK 0 STRANDS 30IN BLK

## (undated) DEVICE — HEMOSTAT SURGICEL 4X8IN

## (undated) DEVICE — DRESSING ADH ISLAND 3.6 X 14

## (undated) DEVICE — PAD RADIOLUCENT STAT ADULT

## (undated) DEVICE — SUT 6 18IN STEEL MONO CCS

## (undated) DEVICE — BLADE 4 INCH EDGE UN-INS

## (undated) DEVICE — SEE MEDLINE ITEM 153688

## (undated) DEVICE — BANDAGE ELASTOPLAST 3INX5YDS

## (undated) DEVICE — ELECTRODE EXTENDED BLADE

## (undated) DEVICE — TOWEL OR XRAY WHITE 17X26IN

## (undated) DEVICE — CATH THORACIC 32FR ST

## (undated) DEVICE — CART STAPLE RELD 45MM WHT

## (undated) DEVICE — DRESSING AQUACEL SACRAL 9 X 9

## (undated) DEVICE — GLOVE BIOGEL PI MICRO SZ 7.5

## (undated) DEVICE — HEMOSTAT SURGICEL FIBRLR 1X2IN

## (undated) DEVICE — TRAY CATH LAB OMC

## (undated) DEVICE — TRANSDUCER ADULT DISP

## (undated) DEVICE — SURGICEL POWDER

## (undated) DEVICE — LINE PRESSURE MONITORING 96IN

## (undated) DEVICE — SUT MONOCRYL 3-0 SH U/D

## (undated) DEVICE — DRAIN CHANNEL ROUND 15FR

## (undated) DEVICE — DRESSING TRANS 8X12 TEGADERM

## (undated) DEVICE — DRAPE T THYROID STERILE

## (undated) DEVICE — BANDAGE ESMARK ELASTIC ST 4X9

## (undated) DEVICE — ADHESIVE DERMABOND ADVANCED

## (undated) DEVICE — KIT LEAD LOCKING DEVICE ACC

## (undated) DEVICE — SEE MEDLINE ITEM 146347

## (undated) DEVICE — APPLIER LIGACLIP MED 11IN

## (undated) DEVICE — TAPE CLOTH SOFT MEDIPORE 4IN

## (undated) DEVICE — BIOGLUE 10ML

## (undated) DEVICE — SEE MEDLINE ITEM 152523

## (undated) DEVICE — SUT PROLENE 2-0 30 SH

## (undated) DEVICE — SEALANT COSEAL 8ML.

## (undated) DEVICE — CATH BRIDGE OCCL BLLN 80CM

## (undated) DEVICE — SEE ITEM #153244

## (undated) DEVICE — UNDERGLOVES BIOGEL PI SZ 7 LF

## (undated) DEVICE — SEE MEDLINE ITEM 152622

## (undated) DEVICE — SYR IRRIGATION BULB STER 60ML

## (undated) DEVICE — TRAY SKIN SCRUB WET PREMIUM

## (undated) DEVICE — BENZOIN TINCTURE 0.66ML

## (undated) DEVICE — SEE MEDLINE ITEM 146417

## (undated) DEVICE — DRAPE CVMAX SPLIT ANES SCRN

## (undated) DEVICE — COVER PROBE ADHESIVE 8X72IN

## (undated) DEVICE — SUT 2/0 30IN SILK BLK BRAI

## (undated) DEVICE — SEE MEDLINE ITEM 152522

## (undated) DEVICE — POUCH SELFSEAL GAS STM 5.25X10

## (undated) DEVICE — SHEATH SAFE ULTRA 9FR

## (undated) DEVICE — KIT PROBE COVER WITH GEL

## (undated) DEVICE — INTRODUCER HEMOSTASIS 7.5F

## (undated) DEVICE — PACKING STRIP IDOFORM 1/4X5YD

## (undated) DEVICE — STYLET KIT 52CM